# Patient Record
Sex: FEMALE | Race: WHITE | NOT HISPANIC OR LATINO | Employment: OTHER | URBAN - METROPOLITAN AREA
[De-identification: names, ages, dates, MRNs, and addresses within clinical notes are randomized per-mention and may not be internally consistent; named-entity substitution may affect disease eponyms.]

---

## 2017-01-05 ENCOUNTER — HOSPITAL ENCOUNTER (INPATIENT)
Facility: MEDICAL CENTER | Age: 47
LOS: 5 days | DRG: 556 | End: 2017-01-12
Attending: EMERGENCY MEDICINE | Admitting: HOSPITALIST
Payer: MEDICARE

## 2017-01-05 ENCOUNTER — APPOINTMENT (OUTPATIENT)
Dept: RADIOLOGY | Facility: MEDICAL CENTER | Age: 47
DRG: 556 | End: 2017-01-05
Attending: EMERGENCY MEDICINE
Payer: MEDICARE

## 2017-01-05 LAB
APTT PPP: 27.1 SEC (ref 24.7–36)
HCG SERPL QL: NEGATIVE
INR PPP: 1.04 (ref 0.87–1.13)
PROTHROMBIN TIME: 13.9 SEC (ref 12–14.6)

## 2017-01-05 PROCEDURE — 80053 COMPREHEN METABOLIC PANEL: CPT

## 2017-01-05 PROCEDURE — 85007 BL SMEAR W/DIFF WBC COUNT: CPT

## 2017-01-05 PROCEDURE — 85610 PROTHROMBIN TIME: CPT

## 2017-01-05 PROCEDURE — 84703 CHORIONIC GONADOTROPIN ASSAY: CPT

## 2017-01-05 PROCEDURE — 85652 RBC SED RATE AUTOMATED: CPT

## 2017-01-05 PROCEDURE — 85730 THROMBOPLASTIN TIME PARTIAL: CPT

## 2017-01-05 PROCEDURE — 99285 EMERGENCY DEPT VISIT HI MDM: CPT

## 2017-01-05 PROCEDURE — 83690 ASSAY OF LIPASE: CPT

## 2017-01-05 PROCEDURE — 82550 ASSAY OF CK (CPK): CPT

## 2017-01-05 PROCEDURE — 85027 COMPLETE CBC AUTOMATED: CPT

## 2017-01-05 PROCEDURE — 84443 ASSAY THYROID STIM HORMONE: CPT

## 2017-01-05 PROCEDURE — 84439 ASSAY OF FREE THYROXINE: CPT

## 2017-01-05 PROCEDURE — 36415 COLL VENOUS BLD VENIPUNCTURE: CPT

## 2017-01-05 PROCEDURE — 86140 C-REACTIVE PROTEIN: CPT

## 2017-01-05 RX ORDER — SODIUM CHLORIDE 9 MG/ML
1000 INJECTION, SOLUTION INTRAVENOUS ONCE
Status: COMPLETED | OUTPATIENT
Start: 2017-01-05 | End: 2017-01-06

## 2017-01-05 RX ORDER — MORPHINE SULFATE 4 MG/ML
4 INJECTION, SOLUTION INTRAMUSCULAR; INTRAVENOUS ONCE
Status: COMPLETED | OUTPATIENT
Start: 2017-01-05 | End: 2017-01-06

## 2017-01-05 ASSESSMENT — LIFESTYLE VARIABLES: DO YOU DRINK ALCOHOL: NO

## 2017-01-05 ASSESSMENT — PAIN SCALES - GENERAL: PAINLEVEL_OUTOF10: 9

## 2017-01-05 NOTE — IP AVS SNAPSHOT
Locassa Access Code: Activation code not generated  Current Locassa Status: Active    [employer]  A secure, online tool to manage your health information     [employer]’s Locassa® is a secure, online tool that connects you to your personalized health information from the privacy of your home -- day or night - making it very easy for you to manage your healthcare. Once the activation process is completed, you can even access your medical information using the Locassa brice, which is available for free in the Apple Brice store or Google Play store.     Locassa provides the following levels of access (as shown below):   My Chart Features   [facility] Primary Care Doctor [facility]  Specialists [facility]  Urgent  Care Non-[facility]  Primary Care  Doctor   Email your healthcare team securely and privately 24/7 X X X X   Manage appointments: schedule your next appointment; view details of past/upcoming appointments X      Request prescription refills. X      View recent personal medical records, including lab and immunizations X X X X   View health record, including health history, allergies, medications X X X X   Read reports about your outpatient visits, procedures, consult and ER notes X X X X   See your discharge summary, which is a recap of your hospital and/or ER visit that includes your diagnosis, lab results, and care plan. X X       How to register for Locassa:  1. Go to  [email or web address].  2. Click on the Sign Up Now box, which takes you to the New Member Sign Up page. You will need to provide the following information:  a. Enter your Locassa Access Code exactly as it appears at the top of this page. (You will not need to use this code after you’ve completed the sign-up process. If you do not sign up before the expiration date, you must request a new code.)   b. Enter your date of birth.   c. Enter your home email address.   d. Click Submit, and follow the next screen’s instructions.  3. Create a Locassa ID. This will  be your Invizeon login ID and cannot be changed, so think of one that is secure and easy to remember.  4. Create a Invizeon password. You can change your password at any time.  5. Enter your Password Reset Question and Answer. This can be used at a later time if you forget your password.   6. Enter your e-mail address. This allows you to receive e-mail notifications when new information is available in Invizeon.  7. Click Sign Up. You can now view your health information.    For assistance activating your Invizeon account, call [phone]

## 2017-01-05 NOTE — IP AVS SNAPSHOT
" After Visit Summary                                                                                                                  Name:Enedelia Pang  Medical Record Number:[id number]  CSN: [id number]    YOB: 1970   Age: 46 y.o.  Sex: female  HT:1.6 m (5' 2.99\") WT: 72.4 kg (159 lb 9.8 oz)          Admit Date: 1/5/2017     Discharge Date:   Today's Date: 1/12/2017  Attending Doctor:  Kinga Gallagher M.D.                  Allergies:  Prochlorperazine; Sumatriptan; and Vancomycin            Discharge Instructions       Discharge Instructions    Discharged to home by car with relative. Discharged via wheelchair, hospital escort: Yes.  Special equipment needed: Not Applicable    Be sure to schedule a follow-up appointment with your primary care doctor or any specialists as instructed.     Discharge Plan:   Diet Plan: Discussed  Activity Level: Discussed  Confirmed Follow up Appointment: Patient to Call and Schedule Appointment  Confirmed Symptoms Management: Discussed  Medication Reconciliation Updated: Yes  Influenza Vaccine Indication: Indicated: 9 to 64 years of age  Influenza Vaccine Given - only chart on this line when given: Influenza Vaccine Given (See MAR)    I understand that a diet low in cholesterol, fat, and sodium is recommended for good health. Unless I have been given specific instructions below for another diet, I accept this instruction as my diet prescription.   Other diet: regular      · Is patient discharged on Warfarin / Coumadin?   No     · Is patient Post Blood Transfusion?  No    Depression / Suicide Risk    As you are discharged from this [facility] facility, it is important to learn how to keep safe from harming yourself.    Recognize the warning signs:  · Abrupt changes in personality, positive or negative- including increase in energy   · Giving away possessions  · Change in eating patterns- significant weight changes-  positive or negative  · Change in sleeping patterns- " unable to sleep or sleeping all the time   · Unwillingness or inability to communicate  · Depression  · Unusual sadness, discouragement and loneliness  · Talk of wanting to die  · Neglect of personal appearance   · Rebelliousness- reckless behavior  · Withdrawal from people/activities they love  · Confusion- inability to concentrate     If you or a loved one observes any of these behaviors or has concerns about self-harm, here's what you can do:  · Talk about it- your feelings and reasons for harming yourself  · Remove any means that you might use to hurt yourself (examples: pills, rope, extension cords, firearm)  · Get professional help from the community (Mental Health, Substance Abuse, psychological counseling)  · Do not be alone:Call your Safe Contact- someone whom you trust who will be there for you.  · Call your local CRISIS HOTLINE 245-0440 or 255-984-8657  · Call your local Children's Mobile Crisis Response Team [place] (342) 721-1488 or www.Semasio  · Call the toll free National Suicide Prevention Hotlines   · National Suicide Prevention Lifeline 132-143-ZUIK (0224)  · National Hope Line Network 800-SUICIDE (746-7722)        Your appointments     Jan 17, 2017  8:30 AM   Follow Up Visit with Aimee Andrade M.D.   [facility] (--)    [address] [place] [address] [address]  [facility] [address] [phone]           You will be receiving a confirmation call a few days before your appointment from our automated call confirmation system.              Follow-up Information     1. Follow up with Elliott Power M.D. In 1 week.    Specialty:  Family Medicine    Contact information    [address] [facility] [address] [phone]          2. Follow up with Aimee Andrade M.D. In 1 week.    Specialty:  Rheumatology    Contact information    [address] [facility] [address]  [facility] [address] [phone]           Discharge Medication Instructions:    Below are the medications your physician expects you to take  upon discharge:    Review all your home medications and newly ordered medications with your doctor and/or pharmacist. Follow medication instructions as directed by your doctor and/or pharmacist.    Please keep your medication list with you and share with your physician.               Medication List      START taking these medications        Instructions    calcium carbonate 500 MG Tabs   Last time this was given:  500 mg on 1/12/2017  8:23 AM   Commonly known as:  OS-ZAIN 500    Take 1 Tab by mouth 2 times a day, with meals.   Dose:  500 mg       Cholecalciferol 1000 UNIT Tabs   Last time this was given:  1,000 Units on 1/12/2017  8:23 AM   Commonly known as:  VITAMIND D3    Take 1 Tab by mouth every day.   Dose:  1000 Units       cyclobenzaprine 10 MG Tabs   Last time this was given:  10 mg on 1/11/2017  9:57 PM   Commonly known as:  FLEXERIL    Take 1 Tab by mouth 3 times a day as needed for Muscle Spasms.   Dose:  10 mg       ferrous gluconate 324 (38 FE) MG Tabs   Last time this was given:  324 mg on 1/12/2017  8:23 AM   Commonly known as:  FERGON    Take 1 Tab by mouth 2 times a day, with meals.   Dose:  324 mg       omeprazole 20 MG delayed-release capsule   Last time this was given:  20 mg on 1/12/2017  8:23 AM   Commonly known as:  PRILOSEC    Take 1 Cap by mouth every day.   Dose:  20 mg       predniSONE 20 MG Tabs   Last time this was given:  40 mg on 1/12/2017  8:23 AM   Commonly known as:  DELTASONE    Prednisone 40 mg 2 more days then start to taper to 30 mg x 5 days then 20 mg daily x 5 days then 10 mg po q day x 1 week then 5 mg po q day x 1 week then stop.         CONTINUE taking these medications        Instructions    acetaZOLAMIDE  MG Cp12   Last time this was given:  500 mg on 1/12/2017  8:23 AM   Commonly known as:  DIAMOX    Take 500 mg by mouth 2 times a day.   Dose:  500 mg       cephALEXin 500 MG Caps   Commonly known as:  KEFLEX    Take 500 mg by mouth 4 times a day. 5 day course,  finished 12/24/16   Dose:  500 mg       * diphenhydrAMINE 50 MG tablet   Last time this was given:  25 mg on 1/9/2017  1:39 PM   Commonly known as:  BENADRYL    Take 1 Tab by mouth every 6 hours as needed (Headache, give with toradol).   Dose:  50 mg       * diphenhydrAMINE 50 MG/ML Soln   Last time this was given:  50 mg on 1/12/2017  9:25 AM   Commonly known as:  BENADRYL    INJECT IM EVERY 6 HOURS AS NEEDED FOR ACUTE, SEVERE MIGRAINE HEADACHE AS DIRECTED       duloxetine 60 MG Cpep delayed-release capsule   Last time this was given:  60 mg on 1/12/2017  8:23 AM   Commonly known as:  CYMBALTA    Take 60 mg by mouth 2 times a day.   Dose:  60 mg       gabapentin 600 MG tablet   Commonly known as:  NEURONTIN    Take 600 mg by mouth 3 times a day.   Dose:  600 mg       ketorolac 60 MG/2ML Soln   Commonly known as:  TORADOL    60 mg by Intramuscular route Once PRN (migraine).   Dose:  60 mg       MAGNESIUM OXIDE PO    Take 1 Tab by mouth every day.   Dose:  1 Tab       MULTIVITAMIN ADULT PO    Take 1 Tab by mouth every day.   Dose:  1 Tab       oxycodone immediate release 10 MG immediate release tablet   Last time this was given:  15 mg on 1/12/2017  8:28 AM   Commonly known as:  ROXICODONE    Take 1 Tab by mouth every four hours as needed for Moderate Pain.   Dose:  10 mg       VITAMIN B 12 PO    Take 1 Tab by mouth every day.   Dose:  1 Tab       * Notice:  This list has 2 medication(s) that are the same as other medications prescribed for you. Read the directions carefully, and ask your doctor or other care provider to review them with you.            Instructions           Diet / Nutrition:    Follow any diet instructions given to you by your doctor or the dietician, including how much salt (sodium) you are allowed each day.    If you are overweight, talk to your doctor about a weight reduction plan.    Activity:    Remain physically active following your doctor's instructions about exercise and activity.    Rest  often.     Any time you become even a little tired or short of breath, SIT DOWN and rest.    Worsening Symptoms:    Report any of the following signs and symptoms to the doctor's office immediately:    *Pain of jaw, arm, or neck  *Chest pain not relieved by medication                               *Dizziness or loss of consciousness  *Difficulty breathing even when at rest   *More tired than usual                                       *Bleeding drainage or swelling of surgical site  *Swelling of feet, ankles, legs or stomach                 *Fever (>100ºF)  *Pink or blood tinged sputum  *Weight gain (3lbs/day or 5lbs /week)           *Shock from internal defibrillator (if applicable)  *Palpitations or irregular heartbeats                *Cool and/or numb extremities    Stroke Awareness    Common Risk Factors for Stroke include:    Age  Atrial Fibrillation  Carotid Artery Stenosis  Diabetes Mellitus  Excessive alcohol consumption  High blood pressure  Overweight   Physical inactivity  Smoking    Warning signs and symptoms of a stroke include:    *Sudden numbness or weakness of the face, arm or leg (especially on one side of the body).  *Sudden confusion, trouble speaking or understanding.  *Sudden trouble seeing in one or both eyes.  *Sudden trouble walking, dizziness, loss of balance or coordination.Sudden severe headache with no known cause.    It is very important to get treatment quickly when a stroke occurs. If you experience any of the above warning signs, call 911 immediately.                   Disclaimer         Quit Smoking / Tobacco Use:    I understand the use of any tobacco products increases my chance of suffering from future heart disease or stroke and could cause other illnesses which may shorten my life. Quitting the use of tobacco products is the single most important thing I can do to improve my health. For further information on smoking / tobacco cessation call a Toll Free Quit Line at  1-115.604.1135 (*National Cancer [place]) or 1-511.129.1537 (American Lung Association) or you can access the web based program at www.lungusa.org.    [place] Tobacco Users Help Line:  (350) 142-8057       Toll Free: 1-802.448.8367  Quit Tobacco Program [facility] (774)153-6592    Crisis Hotline:    [place] Crisis Hotline:  9-674-FPZCNCW or 1-867.158.3767    [place] Crisis Hotline:    1-841.598.4596 or 419-523-9273    Discharge Survey:   Thank you for choosing [facility]. We hope we did everything we could to make your hospital stay a pleasant one. You may be receiving a phone survey and we would appreciate your time and participation in answering the questions. Your input is very valuable to us in our efforts to improve our service to our patients and their families.        My signature on this form indicates that:    1. I have reviewed and understand the above information.  2. My questions regarding this information have been answered to my satisfaction.  3. I have formulated a plan with my discharge nurse to obtain my prescribed medications for home.                  Disclaimer         __________________________________                     __________       ________                       Patient Signature                                                 Date                    Time

## 2017-01-05 NOTE — ED NOTES
"Chief Complaint   Patient presents with   • Pain     Pt BIB self/family to triage by wheelchair. Pt reports yesterday having sudden episode of difficulty swallowing/choking, Pt reports waking up today with body pain in joints, pt describes pain to be \"screaming\". pt reports more affected right side, pt reports hx of stroke with minimal right sided defecits.      Pt reports surgery two weeks ago, occipital stimulator battery replaced.   Explained to pt triage process, made pt aware to tell this RN of any changes/concerns, pt verbalized understanding of process and instructions given. Pt to ER lobby.    "

## 2017-01-05 NOTE — IP AVS SNAPSHOT
1/12/2017          Enedelia Pang  [address] [place] [address]    Dear Enedelia:    [facility] wants to ensure your discharge home is safe and you or your loved ones have had all your questions answered regarding your care after you leave the hospital.    You may receive a telephone call within two days of your discharge.  This call is to make certain you understand your discharge instructions as well as ensure we provided you with the best care possible during your stay with us.     The call will only last approximately 3-5 minutes and will be done by a nurse.    Once again, we want to ensure your discharge home is safe and that you have a clear understanding of any next steps in your care.  If you have any questions or concerns, please do not hesitate to contact us, we are here for you.  Thank you for choosing [facility] for your healthcare needs.    Sincerely,    Michael Guy    [facility]

## 2017-01-05 NOTE — IP AVS SNAPSHOT
" <p align=\"LEFT\"><IMG SRC=\"//EMRWB/blob$/Images/[email or web address]\" alt=\"Image\" WIDTH=\"50%\" HEIGHT=\"200\" BORDER=\"\"></p>                   Name:Enedelia Pang  Medical Record Number:[id number]  CSN: [id number]    YOB: 1970   Age: 46 y.o.  Sex: female  HT:1.6 m (5' 2.99\") WT: 72.4 kg (159 lb 9.8 oz)          Admit Date: 1/5/2017     Discharge Date:   Today's Date: 1/12/2017  Attending Doctor:  Kinga Gallagher M.D.                  Allergies:  Prochlorperazine; Sumatriptan; and Vancomycin          Your appointments     Jan 17, 2017  8:30 AM   Follow Up Visit with Aimee Andrade M.D.   [facility] (--)    [address] [place] [address] [address]  [facility] [address] [phone]           You will be receiving a confirmation call a few days before your appointment from our automated call confirmation system.              Follow-up Information     1. Follow up with Elliott Power M.D. In 1 week.    Specialty:  Family Medicine    Contact information    [address] [facility] [address] [phone]          2. Follow up with Aimee Andrade M.D. In 1 week.    Specialty:  Rheumatology    Contact information    [address] [place] [address] [phone]           Medication List      Take these Medications        Instructions    acetaZOLAMIDE  MG Cp12   Commonly known as:  DIAMOX    Take 500 mg by mouth 2 times a day.   Dose:  500 mg       calcium carbonate 500 MG Tabs   Commonly known as:  OS-ZAIN 500    Take 1 Tab by mouth 2 times a day, with meals.   Dose:  500 mg       cephALEXin 500 MG Caps   Commonly known as:  KEFLEX    Take 500 mg by mouth 4 times a day. 5 day course, finished 12/24/16   Dose:  500 mg       Cholecalciferol 1000 UNIT Tabs   Commonly known as:  VITAMIND D3    Take 1 Tab by mouth every day.   Dose:  1000 Units       cyclobenzaprine 10 MG Tabs   Commonly known as:  FLEXERIL    Take 1 Tab by mouth 3 times a day as needed for Muscle Spasms.   Dose:  10 mg       * diphenhydrAMINE 50 MG " tablet   Commonly known as:  BENADRYL    Take 1 Tab by mouth every 6 hours as needed (Headache, give with toradol).   Dose:  50 mg       * diphenhydrAMINE 50 MG/ML Soln   Commonly known as:  BENADRYL    INJECT IM EVERY 6 HOURS AS NEEDED FOR ACUTE, SEVERE MIGRAINE HEADACHE AS DIRECTED       duloxetine 60 MG Cpep delayed-release capsule   Commonly known as:  CYMBALTA    Take 60 mg by mouth 2 times a day.   Dose:  60 mg       ferrous gluconate 324 (38 FE) MG Tabs   Commonly known as:  FERGON    Take 1 Tab by mouth 2 times a day, with meals.   Dose:  324 mg       gabapentin 600 MG tablet   Commonly known as:  NEURONTIN    Take 600 mg by mouth 3 times a day.   Dose:  600 mg       ketorolac 60 MG/2ML Soln   Commonly known as:  TORADOL    60 mg by Intramuscular route Once PRN (migraine).   Dose:  60 mg       MAGNESIUM OXIDE PO    Take 1 Tab by mouth every day.   Dose:  1 Tab       MULTIVITAMIN ADULT PO    Take 1 Tab by mouth every day.   Dose:  1 Tab       omeprazole 20 MG delayed-release capsule   Commonly known as:  PRILOSEC    Take 1 Cap by mouth every day.   Dose:  20 mg       oxycodone immediate release 10 MG immediate release tablet   Commonly known as:  ROXICODONE    Take 1 Tab by mouth every four hours as needed for Moderate Pain.   Dose:  10 mg       predniSONE 20 MG Tabs   Commonly known as:  DELTASONE    Prednisone 40 mg 2 more days then start to taper to 30 mg x 5 days then 20 mg daily x 5 days then 10 mg po q day x 1 week then 5 mg po q day x 1 week then stop.       VITAMIN B 12 PO    Take 1 Tab by mouth every day.   Dose:  1 Tab       * Notice:  This list has 2 medication(s) that are the same as other medications prescribed for you. Read the directions carefully, and ask your doctor or other care provider to review them with you.

## 2017-01-06 ENCOUNTER — RESOLUTE PROFESSIONAL BILLING HOSPITAL PROF FEE (OUTPATIENT)
Dept: HOSPITALIST | Facility: MEDICAL CENTER | Age: 47
End: 2017-01-06
Payer: MEDICAID

## 2017-01-06 PROBLEM — M25.449 SWELLING OF JOINT OF HAND: Status: ACTIVE | Noted: 2017-01-06

## 2017-01-06 LAB
ALBUMIN SERPL BCP-MCNC: 4 G/DL (ref 3.2–4.9)
ALBUMIN/GLOB SERPL: 1.1 G/DL
ALP SERPL-CCNC: 217 U/L (ref 30–99)
ALT SERPL-CCNC: 67 U/L (ref 2–50)
ANION GAP SERPL CALC-SCNC: 12 MMOL/L (ref 0–11.9)
ANISOCYTOSIS BLD QL SMEAR: ABNORMAL
APPEARANCE UR: CLEAR
AST SERPL-CCNC: 65 U/L (ref 12–45)
BASOPHILS # BLD AUTO: 0 % (ref 0–1.8)
BASOPHILS # BLD: 0 K/UL (ref 0–0.12)
BILIRUB SERPL-MCNC: 0.5 MG/DL (ref 0.1–1.5)
BILIRUB UR QL STRIP.AUTO: NEGATIVE
BLOOD CULTURE HOLD CXBCH: NORMAL
BUN SERPL-MCNC: 9 MG/DL (ref 8–22)
C3 SERPL-MCNC: 178 MG/DL (ref 87–200)
C4 SERPL-MCNC: 37 MG/DL (ref 19–52)
CALCIUM SERPL-MCNC: 9.4 MG/DL (ref 8.5–10.5)
CHLORIDE SERPL-SCNC: 109 MMOL/L (ref 96–112)
CK SERPL-CCNC: 46 U/L (ref 0–154)
CO2 SERPL-SCNC: 18 MMOL/L (ref 20–33)
COLOR UR: COLORLESS
CREAT SERPL-MCNC: 0.55 MG/DL (ref 0.5–1.4)
CRP SERPL HS-MCNC: 7.92 MG/DL (ref 0–0.75)
CULTURE IF INDICATED INDCX: NO UA CULTURE
EOSINOPHIL # BLD AUTO: 0 K/UL (ref 0–0.51)
EOSINOPHIL NFR BLD: 0 % (ref 0–6.9)
ERYTHROCYTE [DISTWIDTH] IN BLOOD BY AUTOMATED COUNT: 53.1 FL (ref 35.9–50)
ERYTHROCYTE [SEDIMENTATION RATE] IN BLOOD BY WESTERGREN METHOD: 66 MM/HOUR (ref 0–20)
GFR SERPL CREATININE-BSD FRML MDRD: >60 ML/MIN/1.73 M 2
GLOBULIN SER CALC-MCNC: 3.5 G/DL (ref 1.9–3.5)
GLUCOSE SERPL-MCNC: 108 MG/DL (ref 65–99)
GLUCOSE UR STRIP.AUTO-MCNC: NEGATIVE MG/DL
HBV SURFACE AG SER QL: NEGATIVE
HCT VFR BLD AUTO: 32.3 % (ref 37–47)
HGB BLD-MCNC: 9.3 G/DL (ref 12–16)
HYPOCHROMIA BLD QL SMEAR: ABNORMAL
KETONES UR STRIP.AUTO-MCNC: NEGATIVE MG/DL
LEUKOCYTE ESTERASE UR QL STRIP.AUTO: NEGATIVE
LG PLATELETS BLD QL SMEAR: NORMAL
LIPASE SERPL-CCNC: 14 U/L (ref 11–82)
LYMPHOCYTES # BLD AUTO: 1.16 K/UL (ref 1–4.8)
LYMPHOCYTES NFR BLD: 15 % (ref 22–41)
MANUAL DIFF BLD: NORMAL
MCH RBC QN AUTO: 21.1 PG (ref 27–33)
MCHC RBC AUTO-ENTMCNC: 28.8 G/DL (ref 33.6–35)
MCV RBC AUTO: 73.4 FL (ref 81.4–97.8)
MICRO URNS: NORMAL
MICROCYTES BLD QL SMEAR: ABNORMAL
MONOCYTES # BLD AUTO: 0.41 K/UL (ref 0–0.85)
MONOCYTES NFR BLD AUTO: 5.3 % (ref 0–13.4)
MORPHOLOGY BLD-IMP: NORMAL
NEUTROPHILS # BLD AUTO: 6.14 K/UL (ref 2–7.15)
NEUTROPHILS NFR BLD: 78.8 % (ref 44–72)
NEUTS BAND NFR BLD MANUAL: 0.9 % (ref 0–10)
NITRITE UR QL STRIP.AUTO: NEGATIVE
NRBC # BLD AUTO: 0.02 K/UL
NRBC BLD AUTO-RTO: 0.3 /100 WBC
OVALOCYTES BLD QL SMEAR: NORMAL
PH UR STRIP.AUTO: 7.5 [PH]
PLATELET # BLD AUTO: 538 K/UL (ref 164–446)
PLATELET BLD QL SMEAR: NORMAL
PMV BLD AUTO: 9.8 FL (ref 9–12.9)
POIKILOCYTOSIS BLD QL SMEAR: NORMAL
POLYCHROMASIA BLD QL SMEAR: NORMAL
POTASSIUM SERPL-SCNC: 3.8 MMOL/L (ref 3.6–5.5)
PROT SERPL-MCNC: 7.5 G/DL (ref 6–8.2)
PROT UR QL STRIP: NEGATIVE MG/DL
RBC # BLD AUTO: 4.4 M/UL (ref 4.2–5.4)
RBC BLD AUTO: PRESENT
RBC UR QL AUTO: NEGATIVE
RHEUMATOID FACT SER IA-ACNC: <10 IU/ML (ref 0–14)
RUBV AB SER QL: 112.7 IU/ML
SODIUM SERPL-SCNC: 139 MMOL/L (ref 135–145)
SP GR UR STRIP.AUTO: 1.01
T4 FREE SERPL-MCNC: 0.76 NG/DL (ref 0.53–1.43)
TSH SERPL DL<=0.005 MIU/L-ACNC: 0.74 UIU/ML (ref 0.3–3.7)
WBC # BLD AUTO: 7.7 K/UL (ref 4.8–10.8)

## 2017-01-06 PROCEDURE — 83516 IMMUNOASSAY NONANTIBODY: CPT

## 2017-01-06 PROCEDURE — 96376 TX/PRO/DX INJ SAME DRUG ADON: CPT

## 2017-01-06 PROCEDURE — 96372 THER/PROPH/DIAG INJ SC/IM: CPT

## 2017-01-06 PROCEDURE — 96361 HYDRATE IV INFUSION ADD-ON: CPT

## 2017-01-06 PROCEDURE — 96375 TX/PRO/DX INJ NEW DRUG ADDON: CPT

## 2017-01-06 PROCEDURE — 86762 RUBELLA ANTIBODY: CPT

## 2017-01-06 PROCEDURE — 700102 HCHG RX REV CODE 250 W/ 637 OVERRIDE(OP): Performed by: HOSPITALIST

## 2017-01-06 PROCEDURE — 36415 COLL VENOUS BLD VENIPUNCTURE: CPT

## 2017-01-06 PROCEDURE — 86255 FLUORESCENT ANTIBODY SCREEN: CPT

## 2017-01-06 PROCEDURE — 96374 THER/PROPH/DIAG INJ IV PUSH: CPT

## 2017-01-06 PROCEDURE — 302131 K PAD MOTOR: Performed by: HOSPITALIST

## 2017-01-06 PROCEDURE — A9270 NON-COVERED ITEM OR SERVICE: HCPCS | Performed by: HOSPITALIST

## 2017-01-06 PROCEDURE — G0378 HOSPITAL OBSERVATION PER HR: HCPCS

## 2017-01-06 PROCEDURE — 81003 URINALYSIS AUTO W/O SCOPE: CPT

## 2017-01-06 PROCEDURE — 82550 ASSAY OF CK (CPK): CPT

## 2017-01-06 PROCEDURE — 86160 COMPLEMENT ANTIGEN: CPT

## 2017-01-06 PROCEDURE — 86038 ANTINUCLEAR ANTIBODIES: CPT

## 2017-01-06 PROCEDURE — 700111 HCHG RX REV CODE 636 W/ 250 OVERRIDE (IP): Performed by: HOSPITALIST

## 2017-01-06 PROCEDURE — 87591 N.GONORRHOEAE DNA AMP PROB: CPT

## 2017-01-06 PROCEDURE — 87040 BLOOD CULTURE FOR BACTERIA: CPT

## 2017-01-06 PROCEDURE — 87491 CHLMYD TRACH DNA AMP PROBE: CPT

## 2017-01-06 PROCEDURE — 302151 K-PAD 14X20: Performed by: HOSPITALIST

## 2017-01-06 PROCEDURE — 700105 HCHG RX REV CODE 258: Performed by: EMERGENCY MEDICINE

## 2017-01-06 PROCEDURE — 700111 HCHG RX REV CODE 636 W/ 250 OVERRIDE (IP): Performed by: NURSE PRACTITIONER

## 2017-01-06 PROCEDURE — 70450 CT HEAD/BRAIN W/O DYE: CPT

## 2017-01-06 PROCEDURE — 82552 ASSAY OF CPK IN BLOOD: CPT

## 2017-01-06 PROCEDURE — 87340 HEPATITIS B SURFACE AG IA: CPT

## 2017-01-06 PROCEDURE — 86762 RUBELLA ANTIBODY: CPT | Mod: 91

## 2017-01-06 PROCEDURE — 86431 RHEUMATOID FACTOR QUANT: CPT

## 2017-01-06 PROCEDURE — 99220 PR INITIAL OBSERVATION CARE,LEVL III: CPT | Performed by: HOSPITALIST

## 2017-01-06 PROCEDURE — 700105 HCHG RX REV CODE 258: Performed by: HOSPITALIST

## 2017-01-06 PROCEDURE — 86235 NUCLEAR ANTIGEN ANTIBODY: CPT

## 2017-01-06 PROCEDURE — 700111 HCHG RX REV CODE 636 W/ 250 OVERRIDE (IP): Performed by: EMERGENCY MEDICINE

## 2017-01-06 RX ORDER — POLYETHYLENE GLYCOL 3350 17 G/17G
1 POWDER, FOR SOLUTION ORAL DAILY
Status: DISCONTINUED | OUTPATIENT
Start: 2017-01-06 | End: 2017-01-12 | Stop reason: HOSPADM

## 2017-01-06 RX ORDER — DULOXETIN HYDROCHLORIDE 60 MG/1
60 CAPSULE, DELAYED RELEASE ORAL 2 TIMES DAILY
Status: DISCONTINUED | OUTPATIENT
Start: 2017-01-06 | End: 2017-01-12 | Stop reason: HOSPADM

## 2017-01-06 RX ORDER — SODIUM CHLORIDE 9 MG/ML
1000 INJECTION, SOLUTION INTRAVENOUS ONCE
Status: COMPLETED | OUTPATIENT
Start: 2017-01-06 | End: 2017-01-06

## 2017-01-06 RX ORDER — DEXAMETHASONE SODIUM PHOSPHATE 4 MG/ML
4 INJECTION, SOLUTION INTRA-ARTICULAR; INTRALESIONAL; INTRAMUSCULAR; INTRAVENOUS; SOFT TISSUE EVERY 6 HOURS
Status: DISCONTINUED | OUTPATIENT
Start: 2017-01-06 | End: 2017-01-06

## 2017-01-06 RX ORDER — OXYCODONE HYDROCHLORIDE 10 MG/1
10 TABLET ORAL EVERY 4 HOURS PRN
Status: DISCONTINUED | OUTPATIENT
Start: 2017-01-06 | End: 2017-01-09

## 2017-01-06 RX ORDER — KETOROLAC TROMETHAMINE 30 MG/ML
30 INJECTION, SOLUTION INTRAMUSCULAR; INTRAVENOUS EVERY 6 HOURS PRN
Status: DISCONTINUED | OUTPATIENT
Start: 2017-01-06 | End: 2017-01-06

## 2017-01-06 RX ORDER — ACETAZOLAMIDE 500 MG/1
500 CAPSULE, EXTENDED RELEASE ORAL 2 TIMES DAILY
Status: DISCONTINUED | OUTPATIENT
Start: 2017-01-06 | End: 2017-01-12 | Stop reason: HOSPADM

## 2017-01-06 RX ORDER — HEPARIN SODIUM 5000 [USP'U]/ML
5000 INJECTION, SOLUTION INTRAVENOUS; SUBCUTANEOUS EVERY 8 HOURS
Status: DISCONTINUED | OUTPATIENT
Start: 2017-01-06 | End: 2017-01-10

## 2017-01-06 RX ORDER — LABETALOL HYDROCHLORIDE 5 MG/ML
10 INJECTION, SOLUTION INTRAVENOUS EVERY 4 HOURS PRN
Status: DISCONTINUED | OUTPATIENT
Start: 2017-01-06 | End: 2017-01-12 | Stop reason: HOSPADM

## 2017-01-06 RX ORDER — DIPHENHYDRAMINE HYDROCHLORIDE 50 MG/ML
50 INJECTION INTRAMUSCULAR; INTRAVENOUS ONCE
Status: COMPLETED | OUTPATIENT
Start: 2017-01-06 | End: 2017-01-06

## 2017-01-06 RX ORDER — PREDNISONE 20 MG/1
40 TABLET ORAL DAILY
Status: DISCONTINUED | OUTPATIENT
Start: 2017-01-06 | End: 2017-01-12 | Stop reason: HOSPADM

## 2017-01-06 RX ORDER — GABAPENTIN 400 MG/1
400 CAPSULE ORAL 3 TIMES DAILY
Status: DISCONTINUED | OUTPATIENT
Start: 2017-01-06 | End: 2017-01-12 | Stop reason: HOSPADM

## 2017-01-06 RX ORDER — MORPHINE SULFATE 4 MG/ML
2-4 INJECTION, SOLUTION INTRAMUSCULAR; INTRAVENOUS EVERY 4 HOURS PRN
Status: DISCONTINUED | OUTPATIENT
Start: 2017-01-06 | End: 2017-01-06

## 2017-01-06 RX ORDER — GABAPENTIN 600 MG/1
600 TABLET ORAL 3 TIMES DAILY
COMMUNITY
End: 2020-03-08

## 2017-01-06 RX ORDER — CEPHALEXIN 500 MG/1
500 CAPSULE ORAL 4 TIMES DAILY
COMMUNITY
Start: 2016-12-19 | End: 2017-04-29

## 2017-01-06 RX ORDER — ACETAMINOPHEN 325 MG/1
650 TABLET ORAL EVERY 6 HOURS PRN
Status: DISCONTINUED | OUTPATIENT
Start: 2017-01-06 | End: 2017-01-12 | Stop reason: HOSPADM

## 2017-01-06 RX ORDER — KETOROLAC TROMETHAMINE 30 MG/ML
60 INJECTION, SOLUTION INTRAMUSCULAR; INTRAVENOUS
COMMUNITY
End: 2017-04-29

## 2017-01-06 RX ORDER — KETOROLAC TROMETHAMINE 30 MG/ML
30 INJECTION, SOLUTION INTRAMUSCULAR; INTRAVENOUS EVERY 6 HOURS PRN
Status: DISCONTINUED | OUTPATIENT
Start: 2017-01-06 | End: 2017-01-08

## 2017-01-06 RX ADMIN — SODIUM CHLORIDE 1000 ML: 9 INJECTION, SOLUTION INTRAVENOUS at 03:58

## 2017-01-06 RX ADMIN — DULOXETINE HYDROCHLORIDE 60 MG: 60 CAPSULE, DELAYED RELEASE ORAL at 08:08

## 2017-01-06 RX ADMIN — DULOXETINE HYDROCHLORIDE 60 MG: 60 CAPSULE, DELAYED RELEASE ORAL at 20:06

## 2017-01-06 RX ADMIN — HEPARIN SODIUM 5000 UNITS: 5000 INJECTION INTRAVENOUS; SUBCUTANEOUS at 16:16

## 2017-01-06 RX ADMIN — HYDROMORPHONE HYDROCHLORIDE 0.5 MG: 1 INJECTION, SOLUTION INTRAMUSCULAR; INTRAVENOUS; SUBCUTANEOUS at 22:15

## 2017-01-06 RX ADMIN — HYDROMORPHONE HYDROCHLORIDE 0.5 MG: 1 INJECTION, SOLUTION INTRAMUSCULAR; INTRAVENOUS; SUBCUTANEOUS at 14:02

## 2017-01-06 RX ADMIN — ACETAZOLAMIDE 500 MG: 500 CAPSULE, EXTENDED RELEASE ORAL at 20:06

## 2017-01-06 RX ADMIN — HYDROMORPHONE HYDROCHLORIDE 0.5 MG: 1 INJECTION, SOLUTION INTRAMUSCULAR; INTRAVENOUS; SUBCUTANEOUS at 20:08

## 2017-01-06 RX ADMIN — MORPHINE SULFATE 4 MG: 4 INJECTION INTRAVENOUS at 04:18

## 2017-01-06 RX ADMIN — HYDROMORPHONE HYDROCHLORIDE 0.5 MG: 1 INJECTION, SOLUTION INTRAMUSCULAR; INTRAVENOUS; SUBCUTANEOUS at 18:18

## 2017-01-06 RX ADMIN — PREDNISONE 40 MG: 20 TABLET ORAL at 09:03

## 2017-01-06 RX ADMIN — OXYCODONE HYDROCHLORIDE 10 MG: 5 TABLET ORAL at 08:05

## 2017-01-06 RX ADMIN — DEXAMETHASONE SODIUM PHOSPHATE 4 MG: 4 INJECTION, SOLUTION INTRAMUSCULAR; INTRAVENOUS at 06:06

## 2017-01-06 RX ADMIN — HYDROMORPHONE HYDROCHLORIDE 1 MG: 1 INJECTION, SOLUTION INTRAMUSCULAR; INTRAVENOUS; SUBCUTANEOUS at 05:23

## 2017-01-06 RX ADMIN — OXYCODONE HYDROCHLORIDE 10 MG: 5 TABLET ORAL at 12:08

## 2017-01-06 RX ADMIN — ACETAZOLAMIDE 500 MG: 500 CAPSULE, EXTENDED RELEASE ORAL at 08:07

## 2017-01-06 RX ADMIN — CAPSICUM OLEORESIN: 0.25 CREAM TOPICAL at 14:03

## 2017-01-06 RX ADMIN — CAPSICUM OLEORESIN: 0.25 CREAM TOPICAL at 10:21

## 2017-01-06 RX ADMIN — GABAPENTIN 400 MG: 400 CAPSULE ORAL at 20:06

## 2017-01-06 RX ADMIN — SODIUM CHLORIDE 1000 ML: 9 INJECTION, SOLUTION INTRAVENOUS at 00:21

## 2017-01-06 RX ADMIN — DIPHENHYDRAMINE HYDROCHLORIDE 50 MG: 50 INJECTION INTRAMUSCULAR; INTRAVENOUS at 10:21

## 2017-01-06 RX ADMIN — KETOROLAC TROMETHAMINE 30 MG: 30 INJECTION, SOLUTION INTRAMUSCULAR; INTRAVENOUS at 09:04

## 2017-01-06 RX ADMIN — GABAPENTIN 400 MG: 400 CAPSULE ORAL at 08:05

## 2017-01-06 RX ADMIN — HYDROMORPHONE HYDROCHLORIDE 1 MG: 1 INJECTION, SOLUTION INTRAMUSCULAR; INTRAVENOUS; SUBCUTANEOUS at 01:50

## 2017-01-06 RX ADMIN — CAPSICUM OLEORESIN: 0.25 CREAM TOPICAL at 20:07

## 2017-01-06 RX ADMIN — GABAPENTIN 400 MG: 400 CAPSULE ORAL at 15:00

## 2017-01-06 RX ADMIN — MORPHINE SULFATE 4 MG: 4 INJECTION INTRAVENOUS at 00:21

## 2017-01-06 RX ADMIN — HYDROMORPHONE HYDROCHLORIDE 0.5 MG: 1 INJECTION, SOLUTION INTRAMUSCULAR; INTRAVENOUS; SUBCUTANEOUS at 16:15

## 2017-01-06 ASSESSMENT — PAIN SCALES - GENERAL
PAINLEVEL_OUTOF10: 7
PAINLEVEL_OUTOF10: 8
PAINLEVEL_OUTOF10: 10
PAINLEVEL_OUTOF10: 7
PAINLEVEL_OUTOF10: 9
PAINLEVEL_OUTOF10: 9
PAINLEVEL_OUTOF10: 10
PAINLEVEL_OUTOF10: 9
PAINLEVEL_OUTOF10: 10
PAINLEVEL_OUTOF10: 8
PAINLEVEL_OUTOF10: 7
PAINLEVEL_OUTOF10: 10
PAINLEVEL_OUTOF10: 8
PAINLEVEL_OUTOF10: 9
PAINLEVEL_OUTOF10: 7

## 2017-01-06 ASSESSMENT — ENCOUNTER SYMPTOMS
DIARRHEA: 0
FEVER: 0
BLURRED VISION: 0
DIZZINESS: 0
WEIGHT LOSS: 0
FOCAL WEAKNESS: 0
MYALGIAS: 0
VOMITING: 0
SHORTNESS OF BREATH: 0
DOUBLE VISION: 0
CHILLS: 0
PALPITATIONS: 0
WEAKNESS: 1
ABDOMINAL PAIN: 0
COUGH: 0
NAUSEA: 0
HEADACHES: 0
CONSTIPATION: 0

## 2017-01-06 ASSESSMENT — LIFESTYLE VARIABLES
EVER_SMOKED: NEVER
DO YOU DRINK ALCOHOL: NO

## 2017-01-06 ASSESSMENT — PAIN SCALES - WONG BAKER
WONGBAKER_NUMERICALRESPONSE: HURTS A WHOLE LOT
WONGBAKER_NUMERICALRESPONSE: HURTS A WHOLE LOT

## 2017-01-06 NOTE — DISCHARGE PLANNING
Care Transition Team Assessment    Information Source  Orientation : Oriented x 4  Who is responsible for making decisions for patient? : Patient  Source of Information: Patient  Primary Caregiver for Others?: No  Why do you believe you were admitted?:  (all over joint pain)    Readmission Evaluation  Is this a readmission?: No    Elopement Risk  Legal Hold: No  Ambulatory or Self Mobile in Wheelchair: No-Not an Elopement Risk    Interdisciplinary Discharge Planning  Does Admitting Nurse Feel This Could be a Complex Discharge?: No  Primary Care Physician:  (Dr. Elliott Power)  Lives with - Patient's Self Care Capacity: Spouse  Patient or legal guardian wants to designate a caregiver (see row info): Yes  Caregiver name:  (Benoit (spouse) [phone])  Caregiver relationship to patient:  (spouse)  Caregiver contact info:  ([phone])  Support Systems: Family Member(s)  Housing / Facility: [address] [place] [address] House  Do You Take your Prescribed Medications Regularly: Yes  Able to Return to Previous ADL's: Yes  Mobility Issues: No  Prior Services:  (Skilled home health was prior to moving to [place])  Patient Expects to be Discharged to::  (home)  Assistance Needed: Yes ( assist with ADLs as needed)  Durable Medical Equipment: Not Applicable    Discharge Preparedness  [facility] resources needed?: None  What is your plan after discharge?:  (home with )  Do you have concerns about your hospital stay or care after discharge?: No  [facility] [address]  Prescription Coverage: Yes    Functional Assesment  Prior Functional Level: Needs Assist with Activities of Daily Living (needs intermittent assist with ADLs,  assist)    Finances  Source of Income: Other (comment) (working on getting SSDI reinstated)  Medical Insurance Coverage: Medicare    Vision / Hearing Impairment  Vision 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)  Hearing Impairment : No    Values / Beliefs / Concerns  Values /  Beliefs Concerns : No    Advance Directive  Advance Directive?: Living Will, DPOA for Health Care  Durable Power of  Name and Contact :  (Benoit Bird at: [phone])  Document Location:  (Home)    Domestic Abuse  Have you ever been the victim of abuse or violence?: No    Psychological Assessment  Suspect Abuse: No  Suspect Domestic Violence: No  History of Substance Abuse: None  History of Psychiatric Problems: Yes (anxiety/depression)  No Needs at this Time: No Needs at This Time    Discharge Risks or Barriers  Discharge risks or barriers?: No    Anticipated Discharge Information  Anticipated discharge disposition: Home

## 2017-01-06 NOTE — ED NOTES
Pt roomed from [facility]. In hospital gown, on monitor. C/o weakness, pain 9/10 on right side of body. Dependent edema in b/l hands and feet. Upper back slightly edematous.

## 2017-01-06 NOTE — PROGRESS NOTES
Bedside report received 0705. POC discussed with pt and ; Pt c/o of 9/10 pain with minimal relief from previous medications. MD paged for pain intervention; all questions answered at this time.

## 2017-01-06 NOTE — PROGRESS NOTES
Received from red  pod, aox4,  unsteady on her  feet. With joint pain, denies  sob. Call light within reach. Needs attended. Plan of care discussed and understood.

## 2017-01-06 NOTE — ED PROVIDER NOTES
"CHIEF COMPLAINT  Chief Complaint   Patient presents with   • Pain     Pt BIB self/family to triage by wheelchair. Pt reports yesterday having sudden episode of difficulty swallowing/choking, Pt reports waking up today with body pain in joints, pt describes pain to be \"screaming\". pt reports more affected right side, pt reports hx of stroke with minimal right sided defecits.        HPI  Enedelia Pang is a 46 y.o. female who presents if he is joint pain since 3 AM last night. Reports that it has been progressive in nature involving her upper and lower extremities. Also with swelling to her bilateral upper extremities. Increased weakness on her right upper and lower extremity however no involvement of the face. Reports that she has a history of stroke involving the right side. Has a contracted medical history including severe chronic migraines for which she has a nerve stimulator in place. Recently had her battery generator replaced approximately one month ago that is located in her left hip. Was on post operative prophylactic antibiotics which she completed. Denies any fevers. Reports adequate healing of the surgical site.    Has never had similar symptoms in the past. For the past month has had septal perforation of the nose of uncertain etiology. Is being worked up for possible autoimmune process    REVIEW OF SYSTEMS  See HPI for further details. All other systems are negative.     PAST MEDICAL HISTORY   has a past medical history of Migraine with aura, with intractable migraine, so stated, without mention of status migrainosus; H/O Clostridium difficile infection; Hydrocephalus; MRSA infection; Port-a-cath in place; Pituitary abnormality (HCC); Allergy, unspecified not elsewhere classified; Anxiety; Depression; Stroke (HCC); Cold; Snoring; and Pain.    SOCIAL HISTORY  Social History     Social History Main Topics   • Smoking status: Never Smoker    • Smokeless tobacco: Never Used   • Alcohol Use: No   • Drug " "Use: No   • Sexual Activity: Not on file       SURGICAL HISTORY   has past surgical history that includes cholecystectomy; tonsillectomy; appendectomy; tubal ligation; other; other neurological surg; irrigation & debridement neuro (N/A, 6/28/2015); lumbar exploration (N/A, 8/31/2015); and spinal cord stimulator (12/19/2016).    CURRENT MEDICATIONS  Home Medications     **Home medications have not yet been reviewed for this encounter**          ALLERGIES  Allergies   Allergen Reactions   • Prochlorperazine Swelling     Tongue swelling. Reaction as a teen. (compazine)   • Sumatriptan Unspecified     Historical=\"Heart stops.\" Reaction  between 1995 to 1997   • Vancomycin Shortness of Breath and Rash     Reaction in 2005.  D/W patient 8/31/15 - tolerated loading dose of vancomycin administered on 8/30/15 with some itching to chest with decreased infusion rate. Red Man Syndrome       PHYSICAL EXAM  VITAL SIGNS: /79 mmHg  Pulse 105  Temp(Src) 36.2 °C (97.2 °F)  Resp 16  Ht 1.6 m (5' 2.99\")  Wt 74.844 kg (165 lb)  BMI 29.24 kg/m2  SpO2 99%  LMP 11/18/2016 (Exact Date)  Pulse ox interpretation: I interpret this pulse ox as normal.  Constitutional: Alert in no apparent distress.  HENT: No signs of trauma, Bilateral external ears normal, Nose normal.   Eyes: Pupils are equal and reactive, Conjunctiva normal, Non-icteric.   Neck: Normal range of motion, No tenderness, Supple, No stridor.   Lymphatic: No lymphadenopathy noted.   Cardiovascular: Regular rate and rhythm, no murmurs.   Thorax & Lungs: Normal breath sounds, No respiratory distress, No wheezing, No chest tenderness.   Abdomen: Bowel sounds normal, Soft, No tenderness, No masses, No pulsatile masses. No peritoneal signs.  Skin: Warm, Dry, No erythema, No rash.   Back: No bony tenderness, No CVA tenderness.   Extremities: Intact distal pulses, swelling to bilateral hands, tenderness throughout all 4 extremities especially in the upper extremities, No " cyanosis  Musculoskeletal: Good range of motion in all major joints. No tenderness to palpation or major deformities noted.   Neurologic: Alert, 2 out of 5 strength in the right upper and lower extremities, 4-5 strength in the left upper and lower extremities, cranial nerves II through XII grossly intact  Psychiatric: Affect normal, Judgment normal, Mood normal.       DIAGNOSTIC STUDIES / PROCEDURES    LABS  Labs Reviewed   CBC WITH DIFFERENTIAL - Abnormal; Notable for the following:     Hemoglobin 9.3 (*)     Hematocrit 32.3 (*)     MCV 73.4 (*)     MCH 21.1 (*)     MCHC 28.8 (*)     RDW 53.1 (*)     Platelet Count 538 (*)     Neutrophils-Polys 78.80 (*)     Lymphocytes 15.00 (*)     All other components within normal limits    Narrative:     Indicate which anticoagulants the patient is on:->NONE   COMP METABOLIC PANEL - Abnormal; Notable for the following:     Co2 18 (*)     Anion Gap 12.0 (*)     Glucose 108 (*)     AST(SGOT) 65 (*)     ALT(SGPT) 67 (*)     Alkaline Phosphatase 217 (*)     All other components within normal limits    Narrative:     Indicate which anticoagulants the patient is on:->NONE   WESTERGREN SED RATE - Abnormal; Notable for the following:     Sed Rate Westergren 66 (*)     All other components within normal limits    Narrative:     Indicate which anticoagulants the patient is on:->NONE   CRP QUANTITIVE (NON-CARDIAC) - Abnormal; Notable for the following:     Stat C-Reactive Protein 7.92 (*)     All other components within normal limits    Narrative:     Indicate which anticoagulants the patient is on:->NONE   LIPASE    Narrative:     Indicate which anticoagulants the patient is on:->NONE   PROTHROMBIN TIME    Narrative:     Indicate which anticoagulants the patient is on:->NONE   APTT    Narrative:     Indicate which anticoagulants the patient is on:->NONE   HCG QUAL SERUM    Narrative:     Indicate which anticoagulants the patient is on:->NONE   CREATINE KINASE    Narrative:     Indicate  "which anticoagulants the patient is on:->NONE   TSH    Narrative:     Indicate which anticoagulants the patient is on:->NONE   FREE THYROXINE    Narrative:     Indicate which anticoagulants the patient is on:->NONE   ESTIMATED GFR    Narrative:     Indicate which anticoagulants the patient is on:->NONE   DIFFERENTIAL MANUAL    Narrative:     Indicate which anticoagulants the patient is on:->NONE   PERIPHERAL SMEAR REVIEW    Narrative:     Indicate which anticoagulants the patient is on:->NONE   PLATELET ESTIMATE    Narrative:     Indicate which anticoagulants the patient is on:->NONE   MORPHOLOGY    Narrative:     Indicate which anticoagulants the patient is on:->NONE   BLOOD CULTURE,HOLD   BLOOD CULTURE,HOLD   CK ISOENZYMES SERUM   ANTI-NUCLEAR ANTIBODY SERUM   COMPLEMENT C3   COMPLEMENT C4   RHEUMATOID ARTHRITIS FACTOR   RUBELLA ABS IGG   RUBELLA ABS IGM   HEP B SURFACE ANTIGEN   BLOOD CULTURE    Narrative:     Per Hospital Policy: Only change Specimen Src: to \"Line\" if  specified by physician order.   CENTROMERE AB, IGG   ANTI SCL-70 ABS   ANTI-NEUTROPHIL CYTOPLASMIC AB   BLOOD CULTURE,HOLD   URINALYSIS,CULTURE IF INDICATED   CHLAMYDIA/GC PCR URINE OR SWAB       RADIOLOGY  CT-HEAD W/O   Final Result      No acute intracranial abnormality.               INTERPRETING LOCATION:  [address] [place] [address], [facility]            COURSE & MEDICAL DECISION MAKING  Pertinent Labs & Imaging studies reviewed. (See chart for details)  46-year-old female presenting with joint pains for the past day. Involving upper and lower extremities however primarily involving the upper extremities. She also has concurrent weakness in the right upper and lower extremity. No involvement of the face. Has had a history of stroke with right-sided involvement in the past. CT of the head was performed that was unremarkable. This does not overall appear to be a stroke however. Regardless, the patient is outside of the window for alteplase administration " for stroke treatment. Symptoms have been ongoing for well over 12 hours.      Given the degree of the patient's symptoms including severe joint pain and right-sided weakness with inability to perform baseline tasks, will admit to the hospitalist service for further evaluation. Patient's symptoms most consistent with an autoimmune process however uncertain at this time. Elevated ESR and CRP. No obvious signs of infection.        Spoke with the hospitalist regarding admission and she agreed to admission.    FINAL IMPRESSION   Right-sided weakness   joint pain   joint swelling      Electronically signed by: Eric Smith, 1/5/2017 11:03 PM

## 2017-01-06 NOTE — PROGRESS NOTES
"Pt crying/whimppering. Stating her pain is a 10/10. Medicated per MAR; Emotional support provided. Pt states,\" The dilaudid did help some for an hour or two.\"  "

## 2017-01-06 NOTE — PROGRESS NOTES
Pt. Wants dilaudid instead of morphine, Dr. Jones notified, with orders to try morphine first, if no relief in 30 mins to call APN on call.

## 2017-01-06 NOTE — ED NOTES
Rechecked vitals. VSS. Tech apologized for wait, patient back in ER Lobby with no new needs. Patient instructed to notify Triage RN or ER Tech of any changes.

## 2017-01-06 NOTE — H&P
CHIEF COMPLAINT:  Joint pain and swelling as well as weakness.    PRIMARY MEDICAL PHYSICIAN:  Elliott Power MD    HISTORY OF PRESENT ILLNESS:  This is a 46-year-old female who comes in with   complaints of bilateral hand joint pain and swelling.  She also describes a   global weakness.  The patient states that she has been followed by ENT in the   past for a septal perforation which was deemed possibly secondary to an   autoimmune disorder such as Wegener's.  She was referred to a rheumatologist;   however, she has been unable to establish care secondary to insurance issues.    Her primary care physician, Dr. Power, has been attempting to work her up   for autoimmune disease.  Because of the involvement of the septum, a test for   Wegener's was obtained, but it was apparently inconclusive.  The patient also   reports that she has been having frequent sores on her skin for the last 1-2   years.  Her joint pain and swelling began in the last 24 hours.  Initially,   she states that it was every single joint that was involved; however, now it   is primarily her bilateral hands.  She had global weakness which has since   improved, although she does report continued weakness on her right hand.  She   denies any recent URIs, no fevers, chills, nausea, or vomiting.  She recently   underwent a battery replacement for her occipital stimulator on December 21st.    She has had some diarrhea, but otherwise no dysuria, no chest pain, no   constipation.  The patient's  also added to the history taking that the   patient has had some difficulty breathing at nighttime with dry mouth.      REVIEW OF SYSTEMS:  A full review of systems was completed and all pertinent   positives and negatives are included in the HPI above.    PAST MEDICAL HISTORY:  Migraine headaches.    PAST SURGICAL HISTORY:  1.  Occipital stimulator with multiple revisions and battery replacement.   2.  Cholecystectomy.   3.  Appendectomy.   4.   Tonsillectomy.   5.  Uterine ablation.   6.  Tubal ligation.   7.  Right knee repair.   8.  Ventricular shunt.     SOCIAL HISTORY:  No tobacco, alcohol, or illicit drugs.  The patient is   unemployed.     FAMILY HISTORY:  Negative for autoimmune diseases, negative for heart disease.    ALLERGIES:  PROCHLORPERAZINE, SUMATRIPTAN, VANCOMYCIN.    HOME MEDICATIONS:   1.  Oxycodone 10 mg p.o. q. 4 hours p.r.n. moderate to severe pain.   2.  Diamox 250 mg p.o. b.i.d.    3.  Multivitamin.    4.  Vitamin B12.    5.  Benadryl over-the-counter.    6.  Gabapentin 400 mg p.o. t.i.d.    7.  Cymbalta 60 mg p.o. b.i.d.      PHYSICAL EXAMINATION:  VITAL SIGNS:  Temperature 97.2, heart rate 105, respirations 16 and blood   pressure 119/79.  Patient is saturating at 99% on room air.  GENERAL:  This is a middle-aged white female who is in no acute distress.  HEENT:  Normocephalic, atraumatic.  EOMI, moist mucous membranes.  NECK:  Supple, there is no JVD.  There is no supraclavicular adenopathy.  CARDIOVASCULAR:  Positive S1, S2, tachycardia, no murmurs appreciated.  PULMONARY:  Clear to auscultation bilaterally.  No wheezes, rubs, or rhonchi   heard.  ABDOMEN:  Soft, nontender, nondistended.  Positive bowel sounds.  No   hepatosplenomegaly.  EXTREMITIES:  Warm, well-perfused:  No clubbing, cyanosis, or edema.    Capillary refill less than 2 seconds.  Distal pulses are intact.  Her   bilateral hands are quite swollen.  She has limited range of motion.  NEUROLOGIC:  A and O x3.  Cranial nerves II-XII grossly intact.    LABORATORY STUDIES:  WBC 7.7, hemoglobin 9.3, hematocrit 32.3 and platelets   538,  Sodium is 139, potassium 3.8, chloride 109, CO2 is 18, anion gap 12,   glucose 108, BUN 9 and creatinine 0.55.  GFR is greater than 60.  AST 65, ALT   67 and alkaline phosphatase is 217.  CRP is 7.92.  ESR is 66.    IMAGING:  CT scan of the head:  No acute intracranial abnormality.    ASSESSMENT AND PLAN:  This is a 46-year-old female who  comes in with   complaints of diffuse multijoint pain and swelling and global weakness.   1.  Acute polyarthritis:  The patient does likely have some type of   inflammatory process ongoing at this time in light of her elevated   inflammatory markers.  She has been worked up by her primary care physician   for Wegener's recently given her septal defect, but the tests were apparently   inconclusive.  The differential diagnoses would be quite broad and can include   gout, or pseudogout, although this is less likely given the involvement of   multiple joints.  Infection or connective tissue disease as well as vasculitis   are also possibilities.  At this point, I will begin workup for her, many of   which will be sent out and she will ultimately need to follow up with her   primary care provider to discuss these results.  I am casting the net quite   wide and I am checking for gonococcal arthritis, hepatitis infection, parvo   infection, or rubella infection.  I will also work her up for rheumatoid   arthritis, SLE, scleroderma, and Sjogren syndrome.  Given the fact that she is   having an acute flare now, I will also check her for Wegener's.  In the   meantime, I will continue symptomatic management with pain control.  I will   also give her a trial of steroids with Decadron.  If there is no improvement,   we will consider an inpatient rheumatology consultation.  I have ordered labs   for complement 3 and 4, hepatitis, rheumatoid factor, rubella, CK,   anti-scleroderma, anticentromere, anti-double stranded DNA, anticentromere, and   ANCA.  2.  Migraine headache:  Continue home Diamox.    DISPOSITION:  Medical floor.    PROPHYLAXIS:  Sequential compression devices for DVT prophylaxis, no PPI   indicated, stool softeners ordered.    CODE:  Full.       ____________________________________     TANIA FONTANEZ MD    DTC / NTS    DD:  01/06/2017 03:36:42  DT:  01/06/2017 04:13:11    D#:  [id number]  Job#:  [id number]

## 2017-01-06 NOTE — PROGRESS NOTES
Pt upset dilaudid is no longer available. Per pt take IM Toradol sots at home without relief. Pt crying, verbalizing frustration. Wants to talk to the MD.

## 2017-01-06 NOTE — PROGRESS NOTES
Pt c/o of migraine per pt at home she takes 50 mg benadryl. Pt also upset with medication changes and would like to see MD., Dr. Becki malin for orders.

## 2017-01-07 LAB
ANION GAP SERPL CALC-SCNC: 9 MMOL/L (ref 0–11.9)
BUN SERPL-MCNC: 12 MG/DL (ref 8–22)
C TRACH DNA SPEC QL NAA+PROBE: NEGATIVE
CALCIUM SERPL-MCNC: 9.5 MG/DL (ref 8.5–10.5)
CHLORIDE SERPL-SCNC: 112 MMOL/L (ref 96–112)
CO2 SERPL-SCNC: 17 MMOL/L (ref 20–33)
CREAT SERPL-MCNC: 0.51 MG/DL (ref 0.5–1.4)
ERYTHROCYTE [DISTWIDTH] IN BLOOD BY AUTOMATED COUNT: 53.7 FL (ref 35.9–50)
FERRITIN SERPL-MCNC: 15.8 NG/ML (ref 10–291)
GFR SERPL CREATININE-BSD FRML MDRD: >60 ML/MIN/1.73 M 2
GLUCOSE SERPL-MCNC: 106 MG/DL (ref 65–99)
HCT VFR BLD AUTO: 29.8 % (ref 37–47)
HGB BLD-MCNC: 8.5 G/DL (ref 12–16)
MCH RBC QN AUTO: 21.1 PG (ref 27–33)
MCHC RBC AUTO-ENTMCNC: 28.5 G/DL (ref 33.6–35)
MCV RBC AUTO: 74.1 FL (ref 81.4–97.8)
N GONORRHOEA DNA SPEC QL NAA+PROBE: NEGATIVE
PLATELET # BLD AUTO: 578 K/UL (ref 164–446)
PMV BLD AUTO: 10 FL (ref 9–12.9)
POTASSIUM SERPL-SCNC: 3.6 MMOL/L (ref 3.6–5.5)
RBC # BLD AUTO: 4.02 M/UL (ref 4.2–5.4)
SODIUM SERPL-SCNC: 138 MMOL/L (ref 135–145)
SPECIMEN SOURCE: NORMAL
WBC # BLD AUTO: 13.3 K/UL (ref 4.8–10.8)

## 2017-01-07 PROCEDURE — 85027 COMPLETE CBC AUTOMATED: CPT

## 2017-01-07 PROCEDURE — A9270 NON-COVERED ITEM OR SERVICE: HCPCS | Performed by: HOSPITALIST

## 2017-01-07 PROCEDURE — 96376 TX/PRO/DX INJ SAME DRUG ADON: CPT

## 2017-01-07 PROCEDURE — 86747 PARVOVIRUS ANTIBODY: CPT

## 2017-01-07 PROCEDURE — 36415 COLL VENOUS BLD VENIPUNCTURE: CPT

## 2017-01-07 PROCEDURE — 770006 HCHG ROOM/CARE - MED/SURG/GYN SEMI*

## 2017-01-07 PROCEDURE — 99233 SBSQ HOSP IP/OBS HIGH 50: CPT | Performed by: HOSPITALIST

## 2017-01-07 PROCEDURE — 80048 BASIC METABOLIC PNL TOTAL CA: CPT

## 2017-01-07 PROCEDURE — 700111 HCHG RX REV CODE 636 W/ 250 OVERRIDE (IP): Performed by: HOSPITALIST

## 2017-01-07 PROCEDURE — 82728 ASSAY OF FERRITIN: CPT

## 2017-01-07 PROCEDURE — 96372 THER/PROPH/DIAG INJ SC/IM: CPT

## 2017-01-07 PROCEDURE — 700102 HCHG RX REV CODE 250 W/ 637 OVERRIDE(OP): Performed by: HOSPITALIST

## 2017-01-07 RX ORDER — FERROUS GLUCONATE 324(38)MG
324 TABLET ORAL 2 TIMES DAILY WITH MEALS
Status: DISCONTINUED | OUTPATIENT
Start: 2017-01-07 | End: 2017-01-12 | Stop reason: HOSPADM

## 2017-01-07 RX ADMIN — DULOXETINE HYDROCHLORIDE 60 MG: 60 CAPSULE, DELAYED RELEASE ORAL at 08:47

## 2017-01-07 RX ADMIN — FERROUS GLUCONATE 324 MG: 324 TABLET ORAL at 12:00

## 2017-01-07 RX ADMIN — HYDROMORPHONE HYDROCHLORIDE 0.5 MG: 1 INJECTION, SOLUTION INTRAMUSCULAR; INTRAVENOUS; SUBCUTANEOUS at 23:55

## 2017-01-07 RX ADMIN — PREDNISONE 40 MG: 20 TABLET ORAL at 08:47

## 2017-01-07 RX ADMIN — HYDROMORPHONE HYDROCHLORIDE 0.5 MG: 1 INJECTION, SOLUTION INTRAMUSCULAR; INTRAVENOUS; SUBCUTANEOUS at 15:42

## 2017-01-07 RX ADMIN — HYDROMORPHONE HYDROCHLORIDE 0.5 MG: 1 INJECTION, SOLUTION INTRAMUSCULAR; INTRAVENOUS; SUBCUTANEOUS at 13:31

## 2017-01-07 RX ADMIN — HYDROMORPHONE HYDROCHLORIDE 0.5 MG: 1 INJECTION, SOLUTION INTRAMUSCULAR; INTRAVENOUS; SUBCUTANEOUS at 00:33

## 2017-01-07 RX ADMIN — GABAPENTIN 400 MG: 400 CAPSULE ORAL at 15:42

## 2017-01-07 RX ADMIN — HYDROMORPHONE HYDROCHLORIDE 0.5 MG: 1 INJECTION, SOLUTION INTRAMUSCULAR; INTRAVENOUS; SUBCUTANEOUS at 04:25

## 2017-01-07 RX ADMIN — GABAPENTIN 400 MG: 400 CAPSULE ORAL at 08:47

## 2017-01-07 RX ADMIN — HYDROMORPHONE HYDROCHLORIDE 0.5 MG: 1 INJECTION, SOLUTION INTRAMUSCULAR; INTRAVENOUS; SUBCUTANEOUS at 08:40

## 2017-01-07 RX ADMIN — HYDROMORPHONE HYDROCHLORIDE 0.5 MG: 1 INJECTION, SOLUTION INTRAMUSCULAR; INTRAVENOUS; SUBCUTANEOUS at 19:48

## 2017-01-07 RX ADMIN — ACETAZOLAMIDE 500 MG: 500 CAPSULE, EXTENDED RELEASE ORAL at 19:48

## 2017-01-07 RX ADMIN — HYDROMORPHONE HYDROCHLORIDE 0.5 MG: 1 INJECTION, SOLUTION INTRAMUSCULAR; INTRAVENOUS; SUBCUTANEOUS at 02:26

## 2017-01-07 RX ADMIN — FERROUS GLUCONATE 324 MG: 324 TABLET ORAL at 17:48

## 2017-01-07 RX ADMIN — DULOXETINE HYDROCHLORIDE 60 MG: 60 CAPSULE, DELAYED RELEASE ORAL at 19:48

## 2017-01-07 RX ADMIN — HEPARIN SODIUM 5000 UNITS: 5000 INJECTION INTRAVENOUS; SUBCUTANEOUS at 06:38

## 2017-01-07 RX ADMIN — HYDROMORPHONE HYDROCHLORIDE 0.5 MG: 1 INJECTION, SOLUTION INTRAMUSCULAR; INTRAVENOUS; SUBCUTANEOUS at 17:48

## 2017-01-07 RX ADMIN — ACETAZOLAMIDE 500 MG: 500 CAPSULE, EXTENDED RELEASE ORAL at 08:47

## 2017-01-07 RX ADMIN — GABAPENTIN 400 MG: 400 CAPSULE ORAL at 19:49

## 2017-01-07 RX ADMIN — CAPSICUM OLEORESIN: 0.25 CREAM TOPICAL at 08:48

## 2017-01-07 RX ADMIN — HYDROMORPHONE HYDROCHLORIDE 0.5 MG: 1 INJECTION, SOLUTION INTRAMUSCULAR; INTRAVENOUS; SUBCUTANEOUS at 21:48

## 2017-01-07 RX ADMIN — HYDROMORPHONE HYDROCHLORIDE 0.5 MG: 1 INJECTION, SOLUTION INTRAMUSCULAR; INTRAVENOUS; SUBCUTANEOUS at 06:36

## 2017-01-07 RX ADMIN — HYDROMORPHONE HYDROCHLORIDE 0.5 MG: 1 INJECTION, SOLUTION INTRAMUSCULAR; INTRAVENOUS; SUBCUTANEOUS at 11:28

## 2017-01-07 RX ADMIN — CAPSICUM OLEORESIN: 0.25 CREAM TOPICAL at 15:00

## 2017-01-07 ASSESSMENT — ENCOUNTER SYMPTOMS
DIZZINESS: 0
DIARRHEA: 0
VOMITING: 0
NAUSEA: 0
FEVER: 0
PALPITATIONS: 0
MYALGIAS: 0
CONSTIPATION: 0
CHILLS: 0
DOUBLE VISION: 0
SHORTNESS OF BREATH: 0
WEAKNESS: 1
FOCAL WEAKNESS: 0
HEADACHES: 0
ABDOMINAL PAIN: 0
WEIGHT LOSS: 0
BLURRED VISION: 0
COUGH: 0

## 2017-01-07 ASSESSMENT — PAIN SCALES - GENERAL
PAINLEVEL_OUTOF10: 7
PAINLEVEL_OUTOF10: 9
PAINLEVEL_OUTOF10: 7
PAINLEVEL_OUTOF10: 8
PAINLEVEL_OUTOF10: 7
PAINLEVEL_OUTOF10: 8
PAINLEVEL_OUTOF10: 5
PAINLEVEL_OUTOF10: 8

## 2017-01-07 NOTE — PROGRESS NOTES
Pt sleeping; no signs of distress. No change from previous assessment. Fall precautions in place. Will continue to monitor.

## 2017-01-07 NOTE — PROGRESS NOTES
Assessment done, Pt educated on plan of care waiting on dr rounds  Patient resting in bed, no signs of distress,  no complains of pain at this time. Call light within reach,  side rails up, will monitor. Condition stable

## 2017-01-07 NOTE — PROGRESS NOTES
"Hospital Medicine Interval Note  Date of Service:  1/6/2017    Chief Complaint  46 y.o.-year-old female admitted 1/5/2017 with joint pain and swelling    Interval Problem Update  1/6 - discussed with patient and  at length. Further history somewhat difficult to obtain as she is unclear on a lot of the details. Previous Wegener's workup \"negative\" and told that she needed to wait for a flare. No SOB. B/L hand swelling/tenderness with 8/10 throbbing pain. Prior difficulty swallowing, since resolved.     Consultants/Specialty  Andrade, rheumatology    Disposition  Pending     Review of Systems   Constitutional: Positive for malaise/fatigue. Negative for fever, chills and weight loss.   HENT:        Prior septal perforation   Eyes: Negative for blurred vision and double vision.   Respiratory: Negative for cough and shortness of breath.    Cardiovascular: Negative for chest pain and palpitations.   Gastrointestinal: Negative for nausea, vomiting, abdominal pain, diarrhea and constipation.   Genitourinary: Negative for dysuria.   Musculoskeletal: Negative for myalgias.   Skin: Positive for rash (historical). Negative for itching.   Neurological: Positive for weakness. Negative for dizziness, focal weakness and headaches.   All other systems reviewed and are negative.     Physical Exam Laboratory/Imaging   Filed Vitals:    01/06/17 0315 01/06/17 0339 01/06/17 0800 01/06/17 1240   BP:  124/67 136/64 124/71   Pulse: 97 90 98 93   Temp:  36.3 °C (97.4 °F) 36.3 °C (97.3 °F) 36.8 °C (98.3 °F)   Resp: 21 18 18 18   Height:       Weight:  72.4 kg (159 lb 9.8 oz)     SpO2: 92% 98% 98% 97%     Physical Exam   Constitutional: She is oriented to person, place, and time. She appears well-developed and well-nourished.   HENT:   Head: Normocephalic and atraumatic.   Mouth/Throat: Oropharynx is clear and moist.   Eyes: Conjunctivae and EOM are normal. Pupils are equal, round, and reactive to light. No scleral icterus.   Neck: Normal " range of motion. Neck supple. No tracheal deviation present. No thyromegaly present.   Cardiovascular: Normal rate, regular rhythm, normal heart sounds and intact distal pulses.    No murmur heard.  Pulmonary/Chest: Effort normal and breath sounds normal. No respiratory distress. She has no wheezes.   Abdominal: Soft. Bowel sounds are normal. She exhibits no distension. There is no tenderness.   Musculoskeletal: Normal range of motion. She exhibits no edema or tenderness.   PIP and MCP joints swollen, not boggy but tender on all 10 fingers. Scarring on back of hands. No rash. Cap refill good. Skin pink. Decreased ROM due to pain/swelling   Lymphadenopathy:     She has no cervical adenopathy.        Right: No supraclavicular adenopathy present.        Left: No supraclavicular adenopathy present.   Neurological: She is alert and oriented to person, place, and time. No cranial nerve deficit.   Skin: Skin is warm and dry.   Vitals reviewed.   Lab Results   Component Value Date/Time    WBC 7.7 01/05/2017 11:22 PM    HEMOGLOBIN 9.3* 01/05/2017 11:22 PM    HEMATOCRIT 32.3* 01/05/2017 11:22 PM    PLATELET COUNT 538* 01/05/2017 11:22 PM     Lab Results   Component Value Date/Time    SODIUM 139 01/05/2017 11:22 PM    POTASSIUM 3.8 01/05/2017 11:22 PM    CHLORIDE 109 01/05/2017 11:22 PM    CO2 18* 01/05/2017 11:22 PM    GLUCOSE 108* 01/05/2017 11:22 PM    BUN 9 01/05/2017 11:22 PM    CREATININE 0.55 01/05/2017 11:22 PM      Assessment/Plan    Swelling of joint of hand  Assessment & Plan  Discussed with Dr. Andrade of Rheumatology who will see the patient tomorrow, agrees with Prednisone for now  Discussed with patient at length... Further history is not really that clear. Does seem that she had some difficulty swallowing and some sort of rash/lesions on her hands and feet, and a septal perforation.  Seems rheumatologic in nature. Labs so far are inconclusive, but CRP and ESR are ?.  Appreciate rheumatology  assistance.  Significant pain necessitates continued hospitalization; patient is really unable to take care of herself whatsoever.     Complicated migraine  Assessment & Plan  Diamox, prn gabapentin and benadryn    Chronic pain syndrome  Assessment & Plan  Gabapentin, cymbalta, PRNs.     Labs reviewed and Medications reviewed        DVT Prophylaxis: Heparin    Ulcer prophylaxis: Not indicated

## 2017-01-07 NOTE — PROGRESS NOTES
Pt c/o joint pain, 10/10. Prn pain med given per mar. Swollen hands and joints noted. Family at bedside. poc discussed with pt. Call light within reach. Calls for assistance. Hourly rounding in use

## 2017-01-07 NOTE — CONSULTS
"Chief Complaint- hand arthralgia    Chief Complaint   Patient presents with   • Pain     Pt BIB self/family to triage by wheelchair. Pt reports yesterday having sudden episode of difficulty swallowing/choking, Pt reports waking up today with body pain in joints, pt describes pain to be \"screaming\". pt reports more affected right side, pt reports hx of stroke with minimal right sided defecits.      Enedelia Pang is a 46 y.o. female here as a new Rheumatology Consult referred by Dr. Connell.    HPI:     Ms. Enedelia Pang presents with a  chief complaint of joint pain and swelling as well as weakness in her hands and feet.. She states that on the morning of January4th she woke up at 3 AM with diffuse pain and stiffness. She was not able to support her own weight and required her  to help assist her to the restroom. In addition she noticed hand swelling. She noted pain in her back or hips her hands or elbows or wrists or ankles. She reports never having these episodes before with involvement of joint pains. She has had episodes of hand swelling past that is followed with a breakout of the rash that has been occurring for the past year and half.     She reports about 2 days prior to this episode she had been noticing that she was choking on her saliva. She recently had a battery change for her occipital stimulator. The change this time was performed at a different location. Secondary to the pain from her procedure she had not been sleeping well.    2 months prior she also had an episode of recurrent epistaxis that lasted for 2 weeks. She reports multiple episodes several times a day of prolonged nosebleeds lasting about 10 minutes.  She manages this by putting tissue up her nose. She was never seen or evaluated for this. It was during this period that she realized that she had a septum perforation.  She was evaluated by ENT who provided instructions on conservative management but advised her to return when " her symptoms recurred. Since then she has lost her insurance and has not been able to see ENT again. Her  feels that the diameter of her perforation has been increasing.    In the last few years she also has been experiencing recurrent rashes and infections. She has had a port infection. She she also has had recurrent rashes that start as a small papule and subsequently will blister. The blister then pops, scabs over and take a prolonged time to heal she estimates about 6 weeks. There are pictures in Epic that show well-demarcated circular lesions on her lower extremity as well as upper extremity. The lesions are about half a centimeter in diameter.     In evaluation through her primary care there was concern that she may have granulomatosis polyangiitis. However the patient reports her workup was inconclusive. She was not able to comment on what test was done nor was she able to determine if the test was a true negative.    In review of her previous records is noted that she had a prolonged history of headaches, migraine headaches, history of depression, complex migraine versus TIA.   From her hospitalization at [facility] in 2007 she was given the diagnosis of chronic pain especially with occipital neuralgia/migraine headaches. She also has had an MRI without contrast of the brain in February 2007 that was negative. Her MRV also completed empiric 2007 was also negative.  For her headaches she was first evaluated by neurology in March 2015 with a pre-existing diagnosis of migraine headaches since age 14 correlated with the onset of her periods. Neurology's evaluation noted in 2015 suggest their impression was her headaches her headache disorder represents a chronic migraine complicated by overuse of Percocet. She has had a history of abnormal visual field test from the past that she brings brought with her to clinic in 2015.  She has also had hospitalization for port infection in  "which one of the blood cultures grow Cronobackter Sakazakii. This was pansensitive.      Past Medical history: Her symptoms association with which she described included stabbing twitches 10 minutes. No history of heart disease and history of diabetes. History of hypertension, history of pseudotumor cerebri for which she had a shunt placed in 2011 for persistent increased ICP, she has a history of elevated LFTs back in. 2007 that showed an 8 mm splenic surface, she reports of pituitary lesion that has not been followed since 2003 secondary to an inability to obtain an MRI.  She also has a history of elevated liver enzymes. Thus far unclear where this workup is. She reports that it is thought to be secondary to her prolonged use of Depakote. She reports being on Depakote since age 19. However she has been off this medication for a few years and still reports episodes of transaminitis. She has been evaluated and reportedly was told that there was imaging to suggest there was \"swelling of the liver\".  She has not followed up with GI and is unclear what her status is. She does report a recent visit to [facility] for fever of 104 and at that time her liver enzymes were grossly elevated.    Past surgical history:  Patient has a history of stimulator replaced (she has had 8 revisions  and MRSA infections) and removal secondary to infection, appendectomy, right knee surgery, tonsillectomy, cholecystectomy, tubal ligation. Bilateral occipital block in 2006.    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 She recalls an episode of parvovirus she believes that the sometime last year.     Family history:    Her knowledge of her family history is limited. She her father had diabetes, heart issues. Her mother had migraines, chronic back issues, " and low thyroid. Her cousin had alopecia.      She denies any other rashes other than the intermittent blisters that form. She denies any photosensitivity. She does report hair loss and when she noticed large amounts of hair in her drain as well as her brash. Her  also reports that she has big clumps of hair loss when she runs her hands through her hair. She does report a course of intermittent fevers that has been ongoing. She reports fevers tend to occur about 7:53 PM at night they range from 100-104°F. Typically when it severe she may intermittently take a Tylenol or NSAID. Since her battery change she has not had any elevated fevers.    There are times when she does not take any medication and feels that her fevers will self resolve. She has had perhaps a drenching night sweats.  She denies any prior history of recurrent sinusitis however since noticing her septal perforation she has felt like there is more drainage of green mucus. She'll occasionally reports a hoarse voice a few times a week. She also has mild diarrhea that was initially thought to be C. difficile but subsequently C. difficile was treated and is still having mild diarrhea. She reports no chest pain no shortness of breath no oral ulcers, no dry eyes. She does report dry mouth. She denies any history of eye redness or eye inflammation. She denies any abdominal pain. She denies any hematuria or proteinuria.        Current medicines (including changes today)  Current Facility-Administered Medications   Medication Dose Route Frequency Provider Last Rate Last Dose   • oxycodone immediate release (ROXICODONE) tablet 10 mg  10 mg Oral Q4HRS PRN Renetta Jones M.D.   10 mg at 01/06/17 1208   • gabapentin (NEURONTIN) capsule 400 mg  400 mg Oral TID Renetta Jones M.D.   400 mg at 01/07/17 0847   • duloxetine (CYMBALTA) capsule 60 mg  60 mg Oral BID Renetta Jones M.D.   60 mg at 01/07/17 0847   • acetaZOLAMIDE SR (DIAMOX) capsule 500 mg  500 mg  Oral BID Renetta Jones M.D.   500 mg at 01/07/17 0847   • labetalol (NORMODYNE,TRANDATE) injection 10 mg  10 mg Intravenous Q4HRS PRN Renetta Jones M.D.       • polyethylene glycol/lytes (MIRALAX) PACKET 1 Packet  1 Packet Oral DAILY Renetta Jones M.D.   1 Packet at 01/06/17 0900   • acetaminophen (TYLENOL) tablet 650 mg  650 mg Oral Q6HRS PRN Renetta Jones M.D.       • capsicum oleoresin (TRIXAICIN) 0.025 % cream   Topical TID Herber Connell M.D.       • predniSONE (DELTASONE) tablet 40 mg  40 mg Oral DAILY Herber Connell M.D.   40 mg at 01/07/17 0847   • ketorolac (TORADOL) injection 30 mg  30 mg Intravenous Q6HRS PRN Herber Connell M.D.   30 mg at 01/06/17 0904   • HYDROmorphone (DILAUDID) injection 0.5 mg  0.5 mg Intravenous Q2HRS PRN Herber Connell M.D.   0.5 mg at 01/07/17 0840   • heparin injection 5,000 Units  5,000 Units Subcutaneous Q8HRS Herber Connell M.D.   5,000 Units at 01/07/17 0638     She  has a past medical history of Migraine with aura, with intractable migraine, so stated, without mention of status migrainosus; H/O Clostridium difficile infection; Hydrocephalus; MRSA infection; Port-a-cath in place; Pituitary abnormality (HCC); Allergy, unspecified not elsewhere classified; Anxiety; Depression; Stroke (HCC); Cold; Snoring; and Pain.  She  has past surgical history that includes cholecystectomy; tonsillectomy; appendectomy; tubal ligation; other; other neurological surg; irrigation & debridement neuro (N/A, 6/28/2015); lumbar exploration (N/A, 8/31/2015); and spinal cord stimulator (12/19/2016).  History reviewed. No pertinent family history.  No family status information on file.     Social History   Substance Use Topics   • Smoking status: Never Smoker    • Smokeless tobacco: Never Used   • Alcohol Use: No     Social History     Social History Narrative         ROS  Constitutional ROS: weakness, fevers  Eye ROS: No recent significant change in vision  Ear ROS: reports a  "buzzing sound  Mouth/Throat ROS: dry mouth  Neck ROS: Positive for hoarseness occassional  Pulmonary ROS: No shortness of breath  Cardiovascular ROS: No chest pain  Gastrointestinal ROS: No abdominal pain, No change in bowel habits  Musculoskeletal/Extremities ROS: No clubbing, Positive for arthritis , stiffness , swelling   Hematologic/Lymphatic ROS: Epistaxis 2 months ago  Skin/Integumentary ROS: Recurrent rashes that were thought to be infectious  Neurologic ROS: Chronic headaches       Objective:     Blood pressure 130/61, pulse 96, temperature 36.7 °C (98.1 °F), resp. rate 12, height 1.6 m (5' 2.99\"), weight 72.4 kg (159 lb 9.8 oz), last menstrual period 11/18/2016, SpO2 98 %. Body mass index is 28.28 kg/(m^2).  Physical Exam:    Filed Vitals:    01/06/17 2318 01/07/17 0403 01/07/17 0802 01/07/17 0804   BP: 127/70 128/72 144/67 130/61   Pulse: 98 96 83 96   Temp: 37.1 °C (98.7 °F) 36.6 °C (97.8 °F) 36.4 °C (97.6 °F) 36.7 °C (98.1 °F)   Resp: 16 15 21 12   Height:       Weight:       SpO2: 96% 97% 98% 98%    Body mass index is 28.28 kg/(m^2).    General/Constitutional: NAD she is lying supine, the room is dark. Patient is not diaphoretic.  Eyes: clear conjunctiva, no scleral icterus, EOMI, PERRL  Ears, Nose, Mouth,Throat: no oral ulcers, good dentition, moderately moist mucous membranes and moderate salivary pool, no discharge from ears bilaterally  Cardiovascular: regular rate and rhythm.  No murmurs, gallops, rubs  Respiratory: normal effort, unlabored respiration.  On auscultation no wheezes, rales, rhonchi.  Clear to auscultation.  GI: soft, NTTP no hepatosplenomegaly, nondistended  Musculoskeletal  Axial:  Thoracic no kyphosis  Upper Extremities:  Her hands are diffusely swollen. There is tenderness over the MCPs PIPs and CMC joint bilateral. She also has wrist swelling bilateral.  There is limited range of motion secondary to pain in flexion and extension of the wrist right greater than left. Her range of " motion in flexion and extension of her elbows are reasonable. However she reports pain on movement.  Muscle Strength: She has breakaway weakness on upper extremity testing of her deltoids. She has 5 out of 5 muscle strength in the biceps and triceps. She has about 4 out of 5 on the right upper extremity in testing of her deltoids and a 5 -/5of her right upper extremity in testing of the biceps and triceps. 5/5 in deltoids, biceps, triceps,.  She has difficulty making a full . On passive flexion I am able to flex her fingers at the PIP and MCP. However there is mild stiffness initiation particularly noted in the right hand index finger.  Lower Extremities:  No gross knee effusion bilateral  No MTP tenderness bilateral, but the feet on palpation are tender at the dorsal aspect.  Muscle Strength: 5/5 in dorsiflexion, plantarflexion, knee extension.  Hip flexion is 4 out of 5 in the right lower extremity. Hip flexion is 5 out of 5 in the left lower extremity    Skin: Limited skin exam.  There is scarring of her previous lesions diffusely over her forearms bilateral. I do appreciate a few lesions one in particular on the left forearm about half a centimeter in diameter that appears as a scab. Otherwise no rashes, no digital ulcerations, no alopecia, no tophi, no skin thickening, no nodules  Neuro: CN II-XII grossly intact, Alert, Oriented x 3, moves all four extremities  Psych: normal affect, normal mood, judgement appropriate, follows commands, responses are appropritae  Heme/Lymph: no cervical adenopathy    No results found for: QNTTBGOLD  Lab Results   Component Value Date/Time    HEPATITIS B CORS AB,IGM Negative 07/03/2015 12:25 AM    HEPATITIS B SURFACE ANTIGEN Negative 01/06/2017 04:12 AM     Lab Results   Component Value Date/Time    HEPATITIS C ANTIBODY Negative 07/03/2015 12:25 AM     Lab Results   Component Value Date/Time    SODIUM 138 01/07/2017 04:20 AM    POTASSIUM 3.6 01/07/2017 04:20 AM    CHLORIDE  112 01/07/2017 04:20 AM    CO2 17* 01/07/2017 04:20 AM    GLUCOSE 106* 01/07/2017 04:20 AM    BUN 12 01/07/2017 04:20 AM    CREATININE 0.51 01/07/2017 04:20 AM      Lab Results   Component Value Date/Time    WBC 13.3* 01/07/2017 04:20 AM    RBC 4.02* 01/07/2017 04:20 AM    HEMOGLOBIN 8.5* 01/07/2017 04:20 AM    HEMATOCRIT 29.8* 01/07/2017 04:20 AM    MCV 74.1* 01/07/2017 04:20 AM    MCH 21.1* 01/07/2017 04:20 AM    MCHC 28.5* 01/07/2017 04:20 AM    MPV 10.0 01/07/2017 04:20 AM    NEUTROPHILS-POLYS 78.80* 01/05/2017 11:22 PM    LYMPHOCYTES 15.00* 01/05/2017 11:22 PM    MONOCYTES 5.30 01/05/2017 11:22 PM    EOSINOPHILS 0.00 01/05/2017 11:22 PM    BASOPHILS 0.00 01/05/2017 11:22 PM    HYPOCHROMIA 1+ 01/05/2017 11:22 PM    ANISOCYTOSIS 1+ 01/05/2017 11:22 PM      Lab Results   Component Value Date/Time    CALCIUM 9.5 01/07/2017 04:20 AM    AST(SGOT) 65* 01/05/2017 11:22 PM    ALT(SGPT) 67* 01/05/2017 11:22 PM    ALKALINE PHOSPHATASE 217* 01/05/2017 11:22 PM    TOTAL BILIRUBIN 0.5 01/05/2017 11:22 PM    ALBUMIN 4.0 01/05/2017 11:22 PM    TOTAL PROTEIN 7.5 01/05/2017 11:22 PM     Lab Results   Component Value Date/Time    RHEUMATOID FACTOR -NEPH- <10 01/06/2017 04:12 AM     Lab Results   Component Value Date/Time    SED RATE WESTERGREN 66* 01/05/2017 11:22 PM    STAT C-REACTIVE PROTEIN 7.92* 01/05/2017 11:22 PM     No results found for: CAROL BENITEZVTINTERP  Lab Results   Component Value Date/Time    C3 COMPLEMENT 178.0 01/06/2017 04:12 AM    COMPLEMENT C4 37.0 01/06/2017 04:12 AM     No results found for: ANADIRECT, ANTIDNADS, RNPAB, SMITHAB, CRWEAQL83, SSAROAB, SSBLAAB, COVGJC1VA, CENTROMBAB  Lab Results   Component Value Date/Time    C3 COMPLEMENT 178.0 01/06/2017 04:12 AM     Lab Results   Component Value Date/Time    COLOR Colorless 01/06/2017 12:30 PM    SPECIFIC GRAVITY 1.006 01/06/2017 12:30 PM    PH 7.5 01/06/2017 12:30 PM    GLUCOSE Negative 01/06/2017 12:30 PM    KETONES Negative 01/06/2017 12:30 PM     PROTEIN Negative 01/06/2017 12:30 PM     No results found for: TOTPROTUR, TOTALVOLUME, SAFMDEYK43  No results found for: SSA60, SSA52  No results found for: HBA1C  Lab Results   Component Value Date/Time    CPK TOTAL 46 01/05/2017 11:22 PM     No results found for: G6PD  No results found for: ANKI59ZSJD  No results found for: ACESERUM  No results found for: 25HYDROXY  No results found for: TSH, FREEDIR  Lab Results   Component Value Date/Time    TSH 0.740 01/05/2017 11:22 PM    FREE T-4 0.76 01/05/2017 11:22 PM     No results found for: MICROSOMALA, ANTITHYROGL  No results found for: IGGLYMEABS  No results found for: ANTIMITOCHO, FACTIN  No results found for: IGA, TTRANSIGA, ENDOIGA  No results found for: FLTYPE, CRYSTALSBDF  No results found for: ISTATICAL  No results found for: ISTATCREAT  No results found for: CTELOPEP  No results found for: GBMABG  No results found for: PTHINTACT          Results for orders placed during the hospital encounter of 10/29/14   2-D ECHO W/DOPPLER (ECHOCARDIOGRAM COMP W/O CONT)        Results for orders placed during the hospital encounter of 03/21/15   LE ART/HARRIET             Assessment and Plan:  Ms. Enedelia Pang is a 46-year-old female presenting with acute onset of polyarthralgias affecting primarily the small hands but also diffusely her medium and large joints as well. On exam patient is uncomfortable but her swelling is localized primarily to her hands and wrists.     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 Thus far Rubella is negative.  Although this is acute and this is her first episode we can obtain a rheumatoid factor for screening though my suspicion at this point is low. Also would keep in mind the possibility of still's disease. Her onset of arthralgias early however she is reporting a  pattern of periodic fevers that I hope we will be able to capture during this hospitalization and transaminitis. Her labs during this admission did show an AST of 65 and ALT of 67. In 2015 when we review her liver enzymes they were normal. Typically in Still disease would expect a more chronic course of arthralgias. I like to see documented fevers that are not attributed to an infectious etiology.  In Ms. Pang case it is difficult as she has had recurrent infections. Typically we cannot obtain and ferritin. Please also trend the liver enzymes. To complete the workup polyarthralgias we should consider uric acid level although very unlikely given that there is no history of gallops, nor has there been any hot red swollen joints. I would also like to review a hand x-ray.    Recurrent rash and septal perforation  This could be autoimmune in etiology however there could be other causes as well. The patient reports that she had skin biopsies performed at [facility]. It would be helpful to review these pathology reports to see if there could be a leukocytoclastic vasculitis. Typically rashes of leukocytoclastic vasculitis do not blister on and I would be curious to results. Similarly with her septal perforation and a possible vasculitic rash we would consider autoimmune processes that would include granulomatosis polyangiitis, systemic lupus erythematosus, although not common this could be seen in rheumatoid arthritis. I agree with the current workup and we'll await the results of the  ANCA antibodies, MG antibodies. Given that her complements are normal it is less likely this is this could be a systemic lupus flare. I would also have low suspicion for scleroderma since she is not reporting any signs or symptoms of raynauds. Her UA does not show any proteinuria or red blood cells that would suggest kidney involvement.  It is possible that this could be a limited case of Wegener's however in such instances we would  want tissue to confirm the diagnosis. She may benefit from further evaluation by ENT as she is reporting worsening of her septal perforation.    Elevated ESR and CRP  Could be infectious vs autoimmune.    Anemia  She has had this chronic anemia ongoing unclear what the etiology could be. Would suggest primary team workup or review records from Dr. Power her primary care physician to assess if that hasn't ready been done.     At this point would continue prednisone 40 mg.  She reports improvement in her symptoms in the right hand. Would check Parvovirus, ferritin (will keep in mind ferritin is an acute phase reactant). Trend LFTs.  Follow-up on pending labs.        Patient was seen 60 minutes face-to-face (excluding time for procedures)  of which more than 50% the time was spent counseling the patient regarding  rheumatological conditions and care. Therapy was discussed in detail.  Thank you for this referral.

## 2017-01-07 NOTE — ASSESSMENT & PLAN NOTE
Discussed with Dr. Andrade of rheumatology   Will keep Prednisone for now since her hands are improving  Seems rheumatologic in nature. Labs so far are inconclusive, but CRP and ESR are ?.  Still's, Parvo considered (ferritin, parvo IgM/IgG ordered)  Appreciate rheumatology assistance.  Anticipate at least two more midnights of steroids and close montoring, IV analgesia

## 2017-01-08 ENCOUNTER — APPOINTMENT (OUTPATIENT)
Dept: RADIOLOGY | Facility: MEDICAL CENTER | Age: 47
DRG: 556 | End: 2017-01-08
Attending: INTERNAL MEDICINE
Payer: MEDICARE

## 2017-01-08 LAB
CENTROMERE IGG TITR SER IF: 2 AU/ML (ref 0–40)
ENA SCL70 IGG SER QL: 0 AU/ML (ref 0–40)
NUCLEAR IGG SER QL IA: NORMAL
RUBV IGM SER IA-ACNC: <10 AU/ML

## 2017-01-08 PROCEDURE — 770006 HCHG ROOM/CARE - MED/SURG/GYN SEMI*

## 2017-01-08 PROCEDURE — 73130 X-RAY EXAM OF HAND: CPT | Mod: RT

## 2017-01-08 PROCEDURE — 700102 HCHG RX REV CODE 250 W/ 637 OVERRIDE(OP): Performed by: HOSPITALIST

## 2017-01-08 PROCEDURE — A9270 NON-COVERED ITEM OR SERVICE: HCPCS | Performed by: HOSPITALIST

## 2017-01-08 PROCEDURE — 73130 X-RAY EXAM OF HAND: CPT | Mod: LT

## 2017-01-08 PROCEDURE — 99232 SBSQ HOSP IP/OBS MODERATE 35: CPT | Performed by: INTERNAL MEDICINE

## 2017-01-08 PROCEDURE — 700111 HCHG RX REV CODE 636 W/ 250 OVERRIDE (IP): Performed by: HOSPITALIST

## 2017-01-08 RX ORDER — IBUPROFEN 400 MG/1
600 TABLET ORAL EVERY 6 HOURS PRN
Status: DISCONTINUED | OUTPATIENT
Start: 2017-01-08 | End: 2017-01-09

## 2017-01-08 RX ADMIN — ACETAZOLAMIDE 500 MG: 500 CAPSULE, EXTENDED RELEASE ORAL at 07:57

## 2017-01-08 RX ADMIN — GABAPENTIN 400 MG: 400 CAPSULE ORAL at 14:44

## 2017-01-08 RX ADMIN — HEPARIN SODIUM 5000 UNITS: 5000 INJECTION INTRAVENOUS; SUBCUTANEOUS at 06:00

## 2017-01-08 RX ADMIN — HYDROMORPHONE HYDROCHLORIDE 0.5 MG: 1 INJECTION, SOLUTION INTRAMUSCULAR; INTRAVENOUS; SUBCUTANEOUS at 16:52

## 2017-01-08 RX ADMIN — HEPARIN SODIUM 5000 UNITS: 5000 INJECTION INTRAVENOUS; SUBCUTANEOUS at 21:52

## 2017-01-08 RX ADMIN — DULOXETINE HYDROCHLORIDE 60 MG: 60 CAPSULE, DELAYED RELEASE ORAL at 07:57

## 2017-01-08 RX ADMIN — HYDROMORPHONE HYDROCHLORIDE 0.5 MG: 1 INJECTION, SOLUTION INTRAMUSCULAR; INTRAVENOUS; SUBCUTANEOUS at 19:47

## 2017-01-08 RX ADMIN — FERROUS GLUCONATE 324 MG: 324 TABLET ORAL at 16:52

## 2017-01-08 RX ADMIN — HYDROMORPHONE HYDROCHLORIDE 0.5 MG: 1 INJECTION, SOLUTION INTRAMUSCULAR; INTRAVENOUS; SUBCUTANEOUS at 06:08

## 2017-01-08 RX ADMIN — PREDNISONE 40 MG: 20 TABLET ORAL at 07:56

## 2017-01-08 RX ADMIN — GABAPENTIN 400 MG: 400 CAPSULE ORAL at 07:56

## 2017-01-08 RX ADMIN — ACETAZOLAMIDE 500 MG: 500 CAPSULE, EXTENDED RELEASE ORAL at 21:52

## 2017-01-08 RX ADMIN — DULOXETINE HYDROCHLORIDE 60 MG: 60 CAPSULE, DELAYED RELEASE ORAL at 19:48

## 2017-01-08 RX ADMIN — FERROUS GLUCONATE 324 MG: 324 TABLET ORAL at 07:57

## 2017-01-08 RX ADMIN — HYDROMORPHONE HYDROCHLORIDE 0.5 MG: 1 INJECTION, SOLUTION INTRAMUSCULAR; INTRAVENOUS; SUBCUTANEOUS at 04:05

## 2017-01-08 RX ADMIN — CAPSICUM OLEORESIN: 0.25 CREAM TOPICAL at 15:00

## 2017-01-08 RX ADMIN — HYDROMORPHONE HYDROCHLORIDE 0.5 MG: 1 INJECTION, SOLUTION INTRAMUSCULAR; INTRAVENOUS; SUBCUTANEOUS at 10:16

## 2017-01-08 RX ADMIN — HYDROMORPHONE HYDROCHLORIDE 0.5 MG: 1 INJECTION, SOLUTION INTRAMUSCULAR; INTRAVENOUS; SUBCUTANEOUS at 21:52

## 2017-01-08 RX ADMIN — HYDROMORPHONE HYDROCHLORIDE 0.5 MG: 1 INJECTION, SOLUTION INTRAMUSCULAR; INTRAVENOUS; SUBCUTANEOUS at 08:04

## 2017-01-08 RX ADMIN — GABAPENTIN 400 MG: 400 CAPSULE ORAL at 19:48

## 2017-01-08 RX ADMIN — CAPSICUM OLEORESIN: 0.25 CREAM TOPICAL at 21:00

## 2017-01-08 RX ADMIN — HYDROMORPHONE HYDROCHLORIDE 0.5 MG: 1 INJECTION, SOLUTION INTRAMUSCULAR; INTRAVENOUS; SUBCUTANEOUS at 14:44

## 2017-01-08 RX ADMIN — HYDROMORPHONE HYDROCHLORIDE 0.5 MG: 1 INJECTION, SOLUTION INTRAMUSCULAR; INTRAVENOUS; SUBCUTANEOUS at 12:30

## 2017-01-08 RX ADMIN — CAPSICUM OLEORESIN: 0.25 CREAM TOPICAL at 09:00

## 2017-01-08 RX ADMIN — HYDROMORPHONE HYDROCHLORIDE 0.5 MG: 1 INJECTION, SOLUTION INTRAMUSCULAR; INTRAVENOUS; SUBCUTANEOUS at 02:05

## 2017-01-08 ASSESSMENT — PAIN SCALES - GENERAL
PAINLEVEL_OUTOF10: 7
PAINLEVEL_OUTOF10: 5
PAINLEVEL_OUTOF10: 8
PAINLEVEL_OUTOF10: 5
PAINLEVEL_OUTOF10: 8

## 2017-01-08 ASSESSMENT — ENCOUNTER SYMPTOMS
DIZZINESS: 0
ABDOMINAL PAIN: 0
FEVER: 0
NECK PAIN: 0
MYALGIAS: 0
NAUSEA: 0
FOCAL WEAKNESS: 0
LOSS OF CONSCIOUSNESS: 0
COUGH: 0
CHILLS: 0
BACK PAIN: 1
VOMITING: 0
DEPRESSION: 0
HEADACHES: 0
BLURRED VISION: 0

## 2017-01-08 NOTE — PROGRESS NOTES
Received report from night RN, assumed care at 0700. Pt A&OX4, able to specify needs. Pt with swelling to bilateral hands, pt with complaints of pain 8/10, medicated per MAR. Fall precautions in place, non-skid socks on feet, bed alarm refused, pt calls appropriately. POC discussed, communication board updated. Call light in reach, hourly rounding in place. Will continue to monitor.

## 2017-01-08 NOTE — PROGRESS NOTES
Patient AAOX4, ambulates independently, reports severe pain in hands and joints, diluadid 0.5mg IV given, hands are swollen.

## 2017-01-08 NOTE — PROGRESS NOTES
"Hospital Medicine Interval Note  Date of Service:  1/7/2017    Chief Complaint  46 y.o.-year-old female admitted 1/5/2017 with joint pain and swelling    Interval Problem Update  1/7 - discussed with rheumatology. Discussed with patient and  at the bedside. Feels like RIGHT hand is less swollen, almost able to be used. Pain a little better on this regimen; down to 6/10 in severity, sore, mostly in hands.     1/6 - discussed with patient and  at length. Further history somewhat difficult to obtain as she is unclear on a lot of the details. Previous Wegener's workup \"negative\" and told that she needed to wait for a flare. No SOB. B/L hand swelling/tenderness with 8/10 throbbing pain. Prior difficulty swallowing, since resolved.     Consultants/Specialty  Andrade, rheumatology    Disposition  Pending     Review of Systems   Constitutional: Positive for malaise/fatigue. Negative for fever, chills and weight loss.   HENT:        Prior septal perforation   Eyes: Negative for blurred vision and double vision.   Respiratory: Negative for cough and shortness of breath.    Cardiovascular: Negative for chest pain and palpitations.   Gastrointestinal: Negative for nausea, vomiting, abdominal pain, diarrhea and constipation.   Genitourinary: Negative for dysuria.   Musculoskeletal: Negative for myalgias.   Skin: Positive for rash (historical). Negative for itching.   Neurological: Positive for weakness. Negative for dizziness, focal weakness and headaches.   All other systems reviewed and are negative.     Physical Exam Laboratory/Imaging   Filed Vitals:    01/07/17 0403 01/07/17 0804 01/07/17 1227 01/07/17 1415   BP: 128/72 130/61 131/71 128/72   Pulse: 96 96 93 89   Temp: 36.6 °C (97.8 °F) 36.7 °C (98.1 °F) 36.7 °C (98 °F) 36.9 °C (98.5 °F)   Resp: 15 12 18 14   Height:       Weight:       SpO2: 97% 98% 97% 96%     Physical Exam   Constitutional: She is oriented to person, place, and time. She appears well-developed " and well-nourished.   HENT:   Head: Normocephalic and atraumatic.   Mouth/Throat: Oropharynx is clear and moist.   Eyes: Conjunctivae and EOM are normal. Pupils are equal, round, and reactive to light. No scleral icterus.   Neck: Normal range of motion. Neck supple. No tracheal deviation present. No thyromegaly present.   Cardiovascular: Normal rate, regular rhythm, normal heart sounds and intact distal pulses.    No murmur heard.  Pulmonary/Chest: Effort normal and breath sounds normal. No respiratory distress. She has no wheezes.   Abdominal: Soft. Bowel sounds are normal. She exhibits no distension. There is no tenderness.   Musculoskeletal: Normal range of motion. She exhibits no edema or tenderness.   PIP and MCP joints swollen, not boggy but tender on all 10 fingers. Scarring on back of hands. No rash. Cap refill good. Skin pink. Decreased ROM due to pain/swelling. Less swollen RIGHT hand than yesterday, LEFT still very swollen   Lymphadenopathy:     She has no cervical adenopathy.        Right: No supraclavicular adenopathy present.        Left: No supraclavicular adenopathy present.   Neurological: She is alert and oriented to person, place, and time. No cranial nerve deficit.   Skin: Skin is warm and dry.   Vitals reviewed.   Lab Results   Component Value Date/Time    WBC 13.3* 01/07/2017 04:20 AM    HEMOGLOBIN 8.5* 01/07/2017 04:20 AM    HEMATOCRIT 29.8* 01/07/2017 04:20 AM    PLATELET COUNT 578* 01/07/2017 04:20 AM     Lab Results   Component Value Date/Time    SODIUM 138 01/07/2017 04:20 AM    POTASSIUM 3.6 01/07/2017 04:20 AM    CHLORIDE 112 01/07/2017 04:20 AM    CO2 17* 01/07/2017 04:20 AM    GLUCOSE 106* 01/07/2017 04:20 AM    BUN 12 01/07/2017 04:20 AM    CREATININE 0.51 01/07/2017 04:20 AM      Assessment/Plan    Swelling of joint of hand  Assessment & Plan  Discussed with Dr. Andrade of rheumatology   Will keep Prednisone for now since her hands are improving  Seems rheumatologic in nature. Labs so  far are inconclusive, but CRP and ESR are ?.  Still's, Parvo considered (ferritin, parvo IgM/IgG ordered)  Appreciate rheumatology assistance.  Anticipate at least two more midnights of steroids and close montoring, IV analgesia    Complicated migraine  Assessment & Plan  Diamox, prn gabapentin and benadryn    Chronic pain syndrome  Assessment & Plan  Gabapentin, cymbalta, PRNs.       Labs reviewed and Medications reviewed        DVT Prophylaxis: Heparin    Ulcer prophylaxis: Not indicated

## 2017-01-08 NOTE — CARE PLAN
Problem: Safety  Goal: Will remain free from falls  Safety precautions in place, call light within reach, bed in lowest locked position communication board updated, hourly rounding in place.     Problem: Pain Management  Goal: Pain level will decrease to patient’s comfort goal  0.5 Dilaudid given Q2 hours to achieve pain management goals, patient currently resting in bed at this time.

## 2017-01-08 NOTE — CARE PLAN
Problem: Knowledge Deficit  Goal: Knowledge of disease process/condition, treatment plan, diagnostic tests, and medications will improve  Intervention: Assess knowledge level of disease process/condition, treatment plan, diagnostic tests, and medications  Patient educated about medications and potential side effects.      Problem: Pain Management  Goal: Pain level will decrease to patient’s comfort goal  Intervention: Follow pain managment plan developed in collaboration with patient and Interdisciplinary Team  Patient medicated per MAR for pain management.

## 2017-01-08 NOTE — PROGRESS NOTES
"Patient A0X4, calm and cooperative, states that she feels pain in \"all joints\" and pain is effectively relieved with IV dilauded Q2 hours, patient calls for pain medication appropriately, all needs met at this time.   "

## 2017-01-09 ENCOUNTER — TELEPHONE (OUTPATIENT)
Dept: RHEUMATOLOGY | Facility: PHYSICIAN GROUP | Age: 47
End: 2017-01-09

## 2017-01-09 LAB
25(OH)D3 SERPL-MCNC: 45 NG/ML (ref 30–100)
ANCA IGG TITR SER IF: NORMAL {TITER}
BASOPHILS # BLD AUTO: 0.2 % (ref 0–1.8)
BASOPHILS # BLD: 0.02 K/UL (ref 0–0.12)
CK BB CFR SERPL ELPH: 0 % (ref 0–0)
CK MACRO1 CFR SERPL: 0 % (ref 0–0)
CK MACRO2 CFR SERPL: 0 % (ref 0–0)
CK MB CFR SERPL ELPH: 0 % (ref 0–4)
CK MM CFR SERPL ELPH: 100 % (ref 96–100)
CK SERPL-CCNC: 40 U/L (ref 20–180)
DAT C3D-SP REAG RBC QL: NORMAL
DAT IGG-SP REAG RBC QL: NORMAL
EOSINOPHIL # BLD AUTO: 0.02 K/UL (ref 0–0.51)
EOSINOPHIL NFR BLD: 0.2 % (ref 0–6.9)
ERYTHROCYTE [DISTWIDTH] IN BLOOD BY AUTOMATED COUNT: 50 FL (ref 35.9–50)
FOLATE SERPL-MCNC: >23.6 NG/ML
HAV IGM SERPL QL IA: NEGATIVE
HBV CORE IGM SER QL: NEGATIVE
HBV SURFACE AG SER QL: NEGATIVE
HCT VFR BLD AUTO: 31.6 % (ref 37–47)
HCV AB SER QL: NEGATIVE
HGB BLD-MCNC: 9.6 G/DL (ref 12–16)
HGB RETIC QN AUTO: 22.1 PG/CELL (ref 29–35)
HIV 1+2 AB+HIV1 P24 AG SERPL QL IA: NON REACTIVE
IMM GRANULOCYTES # BLD AUTO: 0.05 K/UL (ref 0–0.11)
IMM GRANULOCYTES NFR BLD AUTO: 0.5 % (ref 0–0.9)
IMM RETICS NFR: 28.3 % (ref 9.3–17.4)
IRON SATN MFR SERPL: 2 % (ref 15–55)
IRON SERPL-MCNC: 10 UG/DL (ref 40–170)
LDH SERPL-CCNC: 107 U/L (ref 107–266)
LYMPHOCYTES # BLD AUTO: 3.42 K/UL (ref 1–4.8)
LYMPHOCYTES NFR BLD: 32.9 % (ref 22–41)
MCH RBC QN AUTO: 21.9 PG (ref 27–33)
MCHC RBC AUTO-ENTMCNC: 30.4 G/DL (ref 33.6–35)
MCV RBC AUTO: 72.1 FL (ref 81.4–97.8)
MONOCYTES # BLD AUTO: 0.42 K/UL (ref 0–0.85)
MONOCYTES NFR BLD AUTO: 4 % (ref 0–13.4)
NEUTROPHILS # BLD AUTO: 6.48 K/UL (ref 2–7.15)
NEUTROPHILS NFR BLD: 62.2 % (ref 44–72)
NRBC # BLD AUTO: 0 K/UL
NRBC BLD AUTO-RTO: 0 /100 WBC
PLATELET # BLD AUTO: 684 K/UL (ref 164–446)
PMV BLD AUTO: 9.5 FL (ref 9–12.9)
RBC # BLD AUTO: 4.38 M/UL (ref 4.2–5.4)
RETICS # AUTO: 0.09 M/UL (ref 0.04–0.06)
RETICS/RBC NFR: 2 % (ref 0.8–2.1)
TIBC SERPL-MCNC: 449 UG/DL (ref 250–450)
VIT B12 SERPL-MCNC: 314 PG/ML (ref 211–911)
WBC # BLD AUTO: 10.4 K/UL (ref 4.8–10.8)

## 2017-01-09 PROCEDURE — 80074 ACUTE HEPATITIS PANEL: CPT

## 2017-01-09 PROCEDURE — 700111 HCHG RX REV CODE 636 W/ 250 OVERRIDE (IP): Performed by: INTERNAL MEDICINE

## 2017-01-09 PROCEDURE — 86480 TB TEST CELL IMMUN MEASURE: CPT

## 2017-01-09 PROCEDURE — 83550 IRON BINDING TEST: CPT

## 2017-01-09 PROCEDURE — A9270 NON-COVERED ITEM OR SERVICE: HCPCS | Performed by: HOSPITALIST

## 2017-01-09 PROCEDURE — 36415 COLL VENOUS BLD VENIPUNCTURE: CPT

## 2017-01-09 PROCEDURE — 82607 VITAMIN B-12: CPT

## 2017-01-09 PROCEDURE — 770006 HCHG ROOM/CARE - MED/SURG/GYN SEMI*

## 2017-01-09 PROCEDURE — 83540 ASSAY OF IRON: CPT

## 2017-01-09 PROCEDURE — 86880 COOMBS TEST DIRECT: CPT

## 2017-01-09 PROCEDURE — 99232 SBSQ HOSP IP/OBS MODERATE 35: CPT | Performed by: INTERNAL MEDICINE

## 2017-01-09 PROCEDURE — 700111 HCHG RX REV CODE 636 W/ 250 OVERRIDE (IP)

## 2017-01-09 PROCEDURE — 87389 HIV-1 AG W/HIV-1&-2 AB AG IA: CPT

## 2017-01-09 PROCEDURE — 83010 ASSAY OF HAPTOGLOBIN QUANT: CPT

## 2017-01-09 PROCEDURE — 85046 RETICYTE/HGB CONCENTRATE: CPT

## 2017-01-09 PROCEDURE — 700102 HCHG RX REV CODE 250 W/ 637 OVERRIDE(OP)

## 2017-01-09 PROCEDURE — A9270 NON-COVERED ITEM OR SERVICE: HCPCS

## 2017-01-09 PROCEDURE — 700102 HCHG RX REV CODE 250 W/ 637 OVERRIDE(OP): Performed by: HOSPITALIST

## 2017-01-09 PROCEDURE — 700102 HCHG RX REV CODE 250 W/ 637 OVERRIDE(OP): Performed by: INTERNAL MEDICINE

## 2017-01-09 PROCEDURE — 83615 LACTATE (LD) (LDH) ENZYME: CPT

## 2017-01-09 PROCEDURE — 82746 ASSAY OF FOLIC ACID SERUM: CPT

## 2017-01-09 PROCEDURE — 700111 HCHG RX REV CODE 636 W/ 250 OVERRIDE (IP): Performed by: HOSPITALIST

## 2017-01-09 PROCEDURE — A9270 NON-COVERED ITEM OR SERVICE: HCPCS | Performed by: INTERNAL MEDICINE

## 2017-01-09 PROCEDURE — 85025 COMPLETE CBC W/AUTO DIFF WBC: CPT

## 2017-01-09 PROCEDURE — 700105 HCHG RX REV CODE 258

## 2017-01-09 PROCEDURE — 82306 VITAMIN D 25 HYDROXY: CPT

## 2017-01-09 RX ORDER — KETOROLAC TROMETHAMINE 10 MG/1
10 TABLET, FILM COATED ORAL EVERY 8 HOURS
Status: DISCONTINUED | OUTPATIENT
Start: 2017-01-09 | End: 2017-01-10

## 2017-01-09 RX ORDER — DIPHENHYDRAMINE HCL 25 MG
25 TABLET ORAL ONCE
Status: COMPLETED | OUTPATIENT
Start: 2017-01-09 | End: 2017-01-09

## 2017-01-09 RX ORDER — ACETAMINOPHEN 325 MG/1
650 TABLET ORAL ONCE
Status: COMPLETED | OUTPATIENT
Start: 2017-01-09 | End: 2017-01-09

## 2017-01-09 RX ORDER — CALCIUM CARBONATE 500(1250)
500 TABLET ORAL 2 TIMES DAILY WITH MEALS
Status: DISCONTINUED | OUTPATIENT
Start: 2017-01-09 | End: 2017-01-12 | Stop reason: HOSPADM

## 2017-01-09 RX ORDER — OXYCODONE HYDROCHLORIDE 5 MG/1
15 TABLET ORAL EVERY 4 HOURS PRN
Status: DISCONTINUED | OUTPATIENT
Start: 2017-01-09 | End: 2017-01-10

## 2017-01-09 RX ORDER — DIPHENHYDRAMINE HYDROCHLORIDE 50 MG/ML
25 INJECTION INTRAMUSCULAR; INTRAVENOUS ONCE
Status: COMPLETED | OUTPATIENT
Start: 2017-01-09 | End: 2017-01-09

## 2017-01-09 RX ADMIN — PREDNISONE 40 MG: 20 TABLET ORAL at 08:44

## 2017-01-09 RX ADMIN — HYDROMORPHONE HYDROCHLORIDE 0.5 MG: 1 INJECTION, SOLUTION INTRAMUSCULAR; INTRAVENOUS; SUBCUTANEOUS at 00:02

## 2017-01-09 RX ADMIN — OXYCODONE HYDROCHLORIDE 15 MG: 5 TABLET ORAL at 08:50

## 2017-01-09 RX ADMIN — IRON DEXTRAN 1000 MG: 50 INJECTION INTRAMUSCULAR; INTRAVENOUS at 16:11

## 2017-01-09 RX ADMIN — FERROUS GLUCONATE 324 MG: 324 TABLET ORAL at 16:09

## 2017-01-09 RX ADMIN — GABAPENTIN 400 MG: 400 CAPSULE ORAL at 16:09

## 2017-01-09 RX ADMIN — DIPHENHYDRAMINE HCL 25 MG: 25 TABLET ORAL at 13:39

## 2017-01-09 RX ADMIN — HYDROMORPHONE HYDROCHLORIDE 0.5 MG: 1 INJECTION, SOLUTION INTRAMUSCULAR; INTRAVENOUS; SUBCUTANEOUS at 10:46

## 2017-01-09 RX ADMIN — CAPSICUM OLEORESIN: 0.25 CREAM TOPICAL at 20:17

## 2017-01-09 RX ADMIN — CAPSICUM OLEORESIN: 0.25 CREAM TOPICAL at 09:00

## 2017-01-09 RX ADMIN — ACETAMINOPHEN 650 MG: 325 TABLET, FILM COATED ORAL at 13:39

## 2017-01-09 RX ADMIN — HYDROMORPHONE HYDROCHLORIDE 0.5 MG: 1 INJECTION, SOLUTION INTRAMUSCULAR; INTRAVENOUS; SUBCUTANEOUS at 15:01

## 2017-01-09 RX ADMIN — KETOROLAC TROMETHAMINE 10 MG: 10 TABLET, FILM COATED ORAL at 08:43

## 2017-01-09 RX ADMIN — FERROUS GLUCONATE 324 MG: 324 TABLET ORAL at 08:44

## 2017-01-09 RX ADMIN — OXYCODONE HYDROCHLORIDE 15 MG: 5 TABLET ORAL at 16:59

## 2017-01-09 RX ADMIN — HEPARIN SODIUM 5000 UNITS: 5000 INJECTION INTRAVENOUS; SUBCUTANEOUS at 06:00

## 2017-01-09 RX ADMIN — HYDROMORPHONE HYDROCHLORIDE 0.5 MG: 1 INJECTION, SOLUTION INTRAMUSCULAR; INTRAVENOUS; SUBCUTANEOUS at 02:20

## 2017-01-09 RX ADMIN — VITAMIN D, TAB 1000IU (100/BT) 1000 UNITS: 25 TAB at 18:00

## 2017-01-09 RX ADMIN — HYDROMORPHONE HYDROCHLORIDE 0.5 MG: 1 INJECTION, SOLUTION INTRAMUSCULAR; INTRAVENOUS; SUBCUTANEOUS at 06:43

## 2017-01-09 RX ADMIN — IRON DEXTRAN 25 MG: 50 INJECTION INTRAMUSCULAR; INTRAVENOUS at 13:36

## 2017-01-09 RX ADMIN — ACETAZOLAMIDE 500 MG: 500 CAPSULE, EXTENDED RELEASE ORAL at 08:44

## 2017-01-09 RX ADMIN — CALCIUM 500 MG: 500 TABLET ORAL at 18:00

## 2017-01-09 RX ADMIN — GABAPENTIN 400 MG: 400 CAPSULE ORAL at 08:43

## 2017-01-09 RX ADMIN — DULOXETINE HYDROCHLORIDE 60 MG: 60 CAPSULE, DELAYED RELEASE ORAL at 08:43

## 2017-01-09 RX ADMIN — CAPSICUM OLEORESIN: 0.25 CREAM TOPICAL at 15:00

## 2017-01-09 RX ADMIN — HYDROMORPHONE HYDROCHLORIDE 0.5 MG: 1 INJECTION, SOLUTION INTRAMUSCULAR; INTRAVENOUS; SUBCUTANEOUS at 04:32

## 2017-01-09 RX ADMIN — HEPARIN SODIUM 5000 UNITS: 5000 INJECTION INTRAVENOUS; SUBCUTANEOUS at 20:13

## 2017-01-09 RX ADMIN — KETOROLAC TROMETHAMINE 10 MG: 10 TABLET, FILM COATED ORAL at 20:12

## 2017-01-09 RX ADMIN — ACETAZOLAMIDE 500 MG: 500 CAPSULE, EXTENDED RELEASE ORAL at 20:15

## 2017-01-09 RX ADMIN — POLYETHYLENE GLYCOL 3350 1 PACKET: 17 POWDER, FOR SOLUTION ORAL at 08:43

## 2017-01-09 RX ADMIN — DULOXETINE HYDROCHLORIDE 60 MG: 60 CAPSULE, DELAYED RELEASE ORAL at 20:13

## 2017-01-09 RX ADMIN — KETOROLAC TROMETHAMINE 10 MG: 10 TABLET, FILM COATED ORAL at 13:39

## 2017-01-09 RX ADMIN — HEPARIN SODIUM 5000 UNITS: 5000 INJECTION INTRAVENOUS; SUBCUTANEOUS at 13:40

## 2017-01-09 RX ADMIN — GABAPENTIN 400 MG: 400 CAPSULE ORAL at 20:13

## 2017-01-09 RX ADMIN — HYDROMORPHONE HYDROCHLORIDE 0.5 MG: 1 INJECTION, SOLUTION INTRAMUSCULAR; INTRAVENOUS; SUBCUTANEOUS at 20:13

## 2017-01-09 ASSESSMENT — ENCOUNTER SYMPTOMS
WEIGHT LOSS: 0
BACK PAIN: 1
LOSS OF CONSCIOUSNESS: 0
DEPRESSION: 0
MYALGIAS: 0
HEADACHES: 0
NECK PAIN: 0
FOCAL WEAKNESS: 0
COUGH: 0
DOUBLE VISION: 0
ABDOMINAL PAIN: 0
DIZZINESS: 0

## 2017-01-09 ASSESSMENT — PAIN SCALES - GENERAL
PAINLEVEL_OUTOF10: 8
PAINLEVEL_OUTOF10: 7
PAINLEVEL_OUTOF10: 6
PAINLEVEL_OUTOF10: 7
PAINLEVEL_OUTOF10: 8

## 2017-01-09 ASSESSMENT — LIFESTYLE VARIABLES: DO YOU DRINK ALCOHOL: NO

## 2017-01-09 NOTE — PROGRESS NOTES
Patient alert/oriented,c/o pain  And given dilaudid iv every 2 hours non the dot,offered po pain meds but refused,fall risk and refused bed alarm.

## 2017-01-09 NOTE — PROGRESS NOTES
IV Iron Per Pharmacy Note    Patient Lean Body Weight:  52.4 kg  Reason for Iron Replacement: Iron Deficiency Anemia      Lab Results   Component Value Date/Time    WBC 10.4 01/09/2017 03:58 AM    RBC 4.38 01/09/2017 03:58 AM    HEMOGLOBIN 9.6* 01/09/2017 03:58 AM    HEMATOCRIT 31.6* 01/09/2017 03:58 AM    MCV 72.1* 01/09/2017 03:58 AM    MCH 21.9* 01/09/2017 03:58 AM    MCHC 30.4* 01/09/2017 03:58 AM    MPV 9.5 01/09/2017 03:58 AM       Recent Labs      01/07/17   1125  01/09/17   0358   FERRITIN  15.8   --    FOLATE   --   >23.6   IRON   --   10*         Recent Labs      01/07/17   0420   CREATININE  0.51          Assessment/Plan:  1. IV Iron Indicated.   2. Give Iron Dextran 25 mg IV test dose following diphenhydramine/acetaminophen premeds over 30 minutes per protocol.  3. If no reaction (Anaphylaxis, Hypotension/Hypertension, N/V/D, Chest pain/Back Pain, Urticaria/Pruritis) in the next hour, proceed to full dose. Nursing to call the pharmacy IV room at ext. [phone] for full dose.  4. Full dose: Iron Dextran 1000 mg IV over 4 hours. Continue to monitor for delayed ADR including: Arthralgia/myalgia, Headache/backache, chills/dizziness/malaise, moderate to high fever and n/v.      Linda Coburn,PharmD

## 2017-01-09 NOTE — DISCHARGE PLANNING
Pulled patient's Advance Directive off of scanning. Unfortunately document is missing pages making it invalid. Spoke with Anil, , to obtain Certificate of Competency.

## 2017-01-09 NOTE — PROGRESS NOTES
INTERVAL HISTORY  Ms. Enedelia Pang states that her hand swelling has improved. She no longer has the sharp shooting pain. The reports a dull ache and persistent stiffness. She still reports joint swelling. She has mild pain in her shoulders still but improved.  She denies any fevers, new rash lesions.      She was given dexamethasone 1/6/2017 4 mg  Prednisone 40 mg po q day 1/6/2017  Prednisone 40 mg 1/7/2017  Prednisone  40 mg 1/8/2017  Prednisone 40 mg 1/9/2017          PHYSICAL EXAM    Filed Vitals:    01/08/17 1600 01/08/17 2000 01/09/17 0400 01/09/17 0800   BP: 125/77 102/89 115/60 119/71   Pulse: 79 89 96 91   Temp: 36.2 °C (97.1 °F) 36.9 °C (98.5 °F) 36.3 °C (97.4 °F) 36.3 °C (97.4 °F)   Resp: 18 16 17 16   Height:       Weight:       SpO2: 94% 95% 98% 97%    Body mass index is 28.28 kg/(m^2).    General/Constitutional: NAD not diaphoretic, comfortable  Eyes: clear conjunctiva, no scleral icterus, EOMI, PERRL  Cardiovascular:2+ peripheral pulses  Respiratory: normal effort, unlabored respiration.  No respiratory distress  Musculoskeletal  Upper Extremities:  Bilateral wrist swelling L>>R.  The right hand swelling is markedly improved with mild tenderness at the MPC, PIP and wrists.  There is more notable synovitis at the MCP and PIP on the left hand with increase swelling in the left wrist.    Elbows have good ROM  Lower Extremities:  No knee effusion bilateral  No MTP tenderness bilateral  No Achilles tenderness.  Skin: Limited skin exam.  Still has previous lesions but no new lesions.    Neuro: CN II-XII grossly intact, Alert, Oriented x 3, moves all four extremities  Psych: normal affect, normal behaviour, follows commands, responses are appropritae      Recent Labs      01/07/17   0420  01/09/17   0358   WBC  13.3*  10.4   RBC  4.02*  4.38   HEMOGLOBIN  8.5*  9.6*   HEMATOCRIT  29.8*  31.6*   MCV  74.1*  72.1*   MCH  21.1*  21.9*   RDW  53.7*  50.0   PLATELETCT  578*  684*   MPV  10.0  9.5    NEUTSPOLYS   --   62.20   LYMPHOCYTES   --   32.90   MONOCYTES   --   4.00   EOSINOPHILS   --   0.20   BASOPHILS   --   0.20       No results found for: QNTTBGOLD  Lab Results   Component Value Date/Time    HEPATITIS B CORS AB,IGM Negative 07/03/2015 12:25 AM    HEPATITIS B SURFACE ANTIGEN Negative 01/06/2017 04:12 AM     Lab Results   Component Value Date/Time    HEPATITIS C ANTIBODY Negative 07/03/2015 12:25 AM     Lab Results   Component Value Date/Time    SODIUM 138 01/07/2017 04:20 AM    POTASSIUM 3.6 01/07/2017 04:20 AM    CHLORIDE 112 01/07/2017 04:20 AM    CO2 17* 01/07/2017 04:20 AM    GLUCOSE 106* 01/07/2017 04:20 AM    BUN 12 01/07/2017 04:20 AM    CREATININE 0.51 01/07/2017 04:20 AM      Lab Results   Component Value Date/Time    WBC 10.4 01/09/2017 03:58 AM    RBC 4.38 01/09/2017 03:58 AM    HEMOGLOBIN 9.6* 01/09/2017 03:58 AM    HEMATOCRIT 31.6* 01/09/2017 03:58 AM    MCV 72.1* 01/09/2017 03:58 AM    MCH 21.9* 01/09/2017 03:58 AM    MCHC 30.4* 01/09/2017 03:58 AM    MPV 9.5 01/09/2017 03:58 AM    NEUTROPHILS-POLYS 62.20 01/09/2017 03:58 AM    LYMPHOCYTES 32.90 01/09/2017 03:58 AM    MONOCYTES 4.00 01/09/2017 03:58 AM    EOSINOPHILS 0.20 01/09/2017 03:58 AM    BASOPHILS 0.20 01/09/2017 03:58 AM    HYPOCHROMIA 1+ 01/05/2017 11:22 PM    ANISOCYTOSIS 1+ 01/05/2017 11:22 PM      Lab Results   Component Value Date/Time    CALCIUM 9.5 01/07/2017 04:20 AM    AST(SGOT) 65* 01/05/2017 11:22 PM    ALT(SGPT) 67* 01/05/2017 11:22 PM    ALKALINE PHOSPHATASE 217* 01/05/2017 11:22 PM    TOTAL BILIRUBIN 0.5 01/05/2017 11:22 PM    ALBUMIN 4.0 01/05/2017 11:22 PM    TOTAL PROTEIN 7.5 01/05/2017 11:22 PM     Lab Results   Component Value Date/Time    RHEUMATOID FACTOR -NEPH- <10 01/06/2017 04:12 AM    ANTINUCLEAR ANTIBODY None Detected 01/06/2017 04:12 AM     Lab Results   Component Value Date/Time    SED RATE WESTERGREN 66* 01/05/2017 11:22 PM    STAT C-REACTIVE PROTEIN 7.92* 01/05/2017 11:22 PM     No results found  for: EMMANUEL DRVVTINTERP  Lab Results   Component Value Date/Time    C3 COMPLEMENT 178.0 01/06/2017 04:12 AM    COMPLEMENT C4 37.0 01/06/2017 04:12 AM     Lab Results   Component Value Date/Time    ANTI-SCL-70 0 01/06/2017 04:12 AM    ANTI-CENTROMERE B AB 2 01/06/2017 04:12 AM     Lab Results   Component Value Date/Time    ANCA IGG <1:20 01/06/2017 04:12 AM    C3 COMPLEMENT 178.0 01/06/2017 04:12 AM     Lab Results   Component Value Date/Time    COLOR Colorless 01/06/2017 12:30 PM    SPECIFIC GRAVITY 1.006 01/06/2017 12:30 PM    PH 7.5 01/06/2017 12:30 PM    GLUCOSE Negative 01/06/2017 12:30 PM    KETONES Negative 01/06/2017 12:30 PM    PROTEIN Negative 01/06/2017 12:30 PM     No results found for: TOTPROTUR, TOTALVOLUME, BTAZUYBC14  No results found for: SSA60, SSA52  No results found for: HBA1C  Lab Results   Component Value Date/Time    CPK TOTAL 46 01/05/2017 11:22 PM     No results found for: G6PD  No results found for: AEKB11SNPB  No results found for: ACESERUM  No results found for: 25HYDROXY  No results found for: TSH, FREEDIR  Lab Results   Component Value Date/Time    TSH 0.740 01/05/2017 11:22 PM    FREE T-4 0.76 01/05/2017 11:22 PM     No results found for: MICROSOMALA, ANTITHYROGL  No results found for: IGGLYMEABS  No results found for: ANTIMITOCHO, FACTIN  No results found for: IGA, TTRANSIGA, ENDOIGA  No results found for: FLTYPE, CRYSTALSBDF  No results found for: ISTATICAL  No results found for: ISTATCREAT  No results found for: CTELOPEP  No results found for: GBMABG  No results found for: PTHINTACT    Assessment and Plan.      Polyarthralgia with acute onset  Hand xrays show periarticular osteopenia which can be seen in inflammatory arthropathies - Rheumatoid arthritis.  RF (-) Would check CCP, obtain feet xrays, and antiDNAse, and follow-up on Parvovirus  MG (-) centromere antibody (-) and SCL 70 antibody (-)  Ferritin is negative so less likely Still's disease also patient has not spiked a  fever during her hospital stay  The challenge in treating Ms. Enedelia Pang is many of medications used to treat inflammatory arthropathies could cause hepatotoxicity and in some cases hepatic fibrosis.  I would like to her to follow up with GI for clarification if she has underlying liver disease.    I would also like to start her on plaquenil however would want to check G6PD.  Please check G6PD, quantiferon, hepatitis C antibody, hepatitis B core antibody, hepatitis B surface antigen, HIV.  Continue prednisone 40 mg for total of 5 days then start to taper to 30 mg x 5 days then 20 mg daily x 5 days then 10 mg po q day x 1 week then 5 mg po q day x 1 week then stop.    I would like to see her January 17, 2017 @ 8:30am for follow-up    Prednisone use  Please start patient on calcium and vitamin D  I reviewed the side effects of prednisone including but not limited to hypertension, glaucoma, diabetes, avascular necrosi and osteoporosis.  I have advised her to take daily calcium and vitamin D.      Abnormal LFT  Please trend LFT  She states she has a previous liver imaging.  If possible please obtain records.  See above    Septal perforation  I spoke with patient today and she is able to follow-up with ENT.  ANCA is negative  If Granulomatosis is still suspected would want tissue to confirm diagnosis.    Anemia  Microcytic with low iron  Direct Honey is negative and LDH is negative.  Haptoglobin is pending    Elevated ESR and CRP  1/1 Blood cultures from 1/6/2017 are negative thus far  With periarticular osteopenia on hand xray, response to prednisone, negative blood culture - likely autoimmune.    Plan discuss with primary team    I did speak with Dr. Jo today.  He reports patient should be able to follow-up with Dr. Soni (ENT).  He reports review of notes indicated that she had a septal perforation of 1-1.5 cm at the time of evaluation but no evidence of crusting or inflammation was observed.  In regards  to LFT this seems to be transient.  He reports historically the LFTs were stable except in Alexandra 10, 2015 when AST was 820 and  that normalized during her hospitalization at [facility] for a diagnosis of colitis.  At that time he reports CT abd/pelvis was done with no mention of abnormal liver.  He also reports in October 2016 LFT were elevated that resolved but etiology unknown.    She does follow with GI.  Dr. Power also reports that her MPO, PR3, c-ANCA and pANCA were negative on his evaluation.      Thank you for the referral.

## 2017-01-09 NOTE — PROGRESS NOTES
Hospital Medicine Progress Note, Adult, Complex               Author: Aayush Sutton Date & Time created: 1/8/2017  8:49 PM     Interval History:  45 y/o female with polyarthralgias and hand swelling    Hand swelling-has improved with steroids, but the L hand is worse than the right. Multiple labs pending at this time. Rheum following. Denies any oral lesions, does not really endorse a gel like quality to her pain. Does not believe repeated motion of joints makes the pain worse.    Anemia-near her baseline. Extended work up further today.    Review of Systems:  Review of Systems   Constitutional: Negative for fever and chills.   HENT: Positive for nosebleeds.    Eyes: Negative for blurred vision.   Respiratory: Negative for cough.    Cardiovascular: Negative for chest pain and leg swelling.   Gastrointestinal: Negative for nausea, vomiting and abdominal pain.   Genitourinary: Negative for dysuria and hematuria.   Musculoskeletal: Positive for back pain and joint pain. Negative for myalgias and neck pain.   Skin: Negative for itching and rash.   Neurological: Negative for dizziness, focal weakness, loss of consciousness and headaches.   Psychiatric/Behavioral: Negative for depression.       Physical Exam:  Physical Exam   Constitutional: She is oriented to person, place, and time. She appears well-developed and well-nourished. No distress.   HENT:   Head: Normocephalic and atraumatic.   Mouth/Throat: No oropharyngeal exudate.   No oral lesions appreciated   Eyes: Pupils are equal, round, and reactive to light. Right eye exhibits no discharge. Left eye exhibits no discharge.   Neck: Normal range of motion. Neck supple.   Cardiovascular: Normal rate, regular rhythm, normal heart sounds and intact distal pulses.    No murmur heard.  Pulmonary/Chest: Effort normal and breath sounds normal. No stridor. No respiratory distress.   Abdominal: Soft. Bowel sounds are normal. There is no tenderness.   Musculoskeletal: Normal  range of motion. She exhibits edema and tenderness.   Swelling of both hands, R>L, DIP and PIP swelling appreciated   Neurological: She is alert and oriented to person, place, and time. No cranial nerve deficit.   Skin: Skin is warm and dry. No rash noted.   Psychiatric: She has a normal mood and affect.   Nursing note and vitals reviewed.      Labs:        Invalid input(s): [id number]  Recent Labs      17   CPKTOTAL  46     Recent Labs      17   SODIUM  139  138   POTASSIUM  3.8  3.6   CHLORIDE  109  112   CO2  18*  17*   BUN  9  12   CREATININE  0.55  0.51   CALCIUM  9.4  9.5     Recent Labs      17   ALTSGPT  67*   --    ASTSGOT  65*   --    ALKPHOSPHAT  217*   --    TBILIRUBIN  0.5   --    LIPASE  14   --    GLUCOSE  108*  106*     Recent Labs      17   1125   RBC  4.40  4.02*   --    HEMOGLOBIN  9.3*  8.5*   --    HEMATOCRIT  32.3*  29.8*   --    PLATELETCT  538*  578*   --    PROTHROMBTM  13.9   --    --    APTT  27.1   --    --    INR  1.04   --    --    FERRITIN   --    --   15.8     Recent Labs      17   WBC  7.7  13.3*   NEUTSPOLYS  78.80*   --    LYMPHOCYTES  15.00*   --    MONOCYTES  5.30   --    EOSINOPHILS  0.00   --    BASOPHILS  0.00   --    ASTSGOT  65*   --    ALTSGPT  67*   --    ALKPHOSPHAT  217*   --    TBILIRUBIN  0.5   --            Hemodynamics:  Temp (24hrs), Av.4 °C (97.5 °F), Min:36.2 °C (97.1 °F), Max:36.8 °C (98.2 °F)  Temperature: 36.2 °C (97.1 °F)  Pulse  Av.2  Min: 65  Max: 114   Blood Pressure: 125/77 mmHg     Respiratory:    Respiration: 18, Pulse Oximetry: 94 %           Fluids:    Intake/Output Summary (Last 24 hours) at 17  Last data filed at 17 1600   Gross per 24 hour   Intake    840 ml   Output      0 ml   Net    840 ml        GI/Nutrition:  Orders Placed This Encounter   Procedures   • Diet Order      Standing Status: Standing      Number of Occurrences: 1      Standing Expiration Date:      Order Specific Question:  Diet:     Answer:  Regular [1]     Medical Decision Making, by Problem:  Active Hospital Problems    Diagnosis   • Swelling of joint of hand [M25.449]-unclear etiology  -certainly seems auto-immune, could be an overlap syndrome  -multiple labs are pending at this time, rheum following  -continue IV dilaudid, IV toradol, PO oxy prN pain and continue prednisone 40 mg daily, which seems to have helped   • Complicated migraine [G43.109]-well controlled, continue outpatient topamax  -cymbalta, capsicum cream, etc.   • Chronic pain syndrome [G89.4]-see above     Anemia-microcytic  -extensive w/u ordered, will F/U in the AM, denies any overt bleeding, no observed jaundice on physical exam      Labs reviewed and Medications reviewed  Lopez catheter: No Lopez      DVT Prophylaxis: Heparin

## 2017-01-10 LAB
ANION GAP SERPL CALC-SCNC: 10 MMOL/L (ref 0–11.9)
B19V IGG SER IA-ACNC: 0.26 IV
B19V IGM SER IA-ACNC: 0.13 IV
BUN SERPL-MCNC: 16 MG/DL (ref 8–22)
CALCIUM SERPL-MCNC: 9.8 MG/DL (ref 8.5–10.5)
CHLORIDE SERPL-SCNC: 110 MMOL/L (ref 96–112)
CO2 SERPL-SCNC: 19 MMOL/L (ref 20–33)
CREAT SERPL-MCNC: 0.72 MG/DL (ref 0.5–1.4)
GFR SERPL CREATININE-BSD FRML MDRD: >60 ML/MIN/1.73 M 2
GLUCOSE SERPL-MCNC: 130 MG/DL (ref 65–99)
HEMOCCULT STL QL: POSITIVE
MAGNESIUM SERPL-MCNC: 2.4 MG/DL (ref 1.5–2.5)
POTASSIUM SERPL-SCNC: 4.1 MMOL/L (ref 3.6–5.5)
SODIUM SERPL-SCNC: 139 MMOL/L (ref 135–145)

## 2017-01-10 PROCEDURE — 82272 OCCULT BLD FECES 1-3 TESTS: CPT

## 2017-01-10 PROCEDURE — 700111 HCHG RX REV CODE 636 W/ 250 OVERRIDE (IP): Performed by: HOSPITALIST

## 2017-01-10 PROCEDURE — 700102 HCHG RX REV CODE 250 W/ 637 OVERRIDE(OP): Performed by: HOSPITALIST

## 2017-01-10 PROCEDURE — 99232 SBSQ HOSP IP/OBS MODERATE 35: CPT | Performed by: INTERNAL MEDICINE

## 2017-01-10 PROCEDURE — 770006 HCHG ROOM/CARE - MED/SURG/GYN SEMI*

## 2017-01-10 PROCEDURE — A9270 NON-COVERED ITEM OR SERVICE: HCPCS | Performed by: HOSPITALIST

## 2017-01-10 PROCEDURE — 700111 HCHG RX REV CODE 636 W/ 250 OVERRIDE (IP): Performed by: INTERNAL MEDICINE

## 2017-01-10 PROCEDURE — A9270 NON-COVERED ITEM OR SERVICE: HCPCS | Performed by: INTERNAL MEDICINE

## 2017-01-10 PROCEDURE — 700102 HCHG RX REV CODE 250 W/ 637 OVERRIDE(OP): Performed by: INTERNAL MEDICINE

## 2017-01-10 PROCEDURE — 83735 ASSAY OF MAGNESIUM: CPT

## 2017-01-10 PROCEDURE — 80048 BASIC METABOLIC PNL TOTAL CA: CPT

## 2017-01-10 PROCEDURE — 36415 COLL VENOUS BLD VENIPUNCTURE: CPT

## 2017-01-10 PROCEDURE — 82955 ASSAY OF G6PD ENZYME: CPT

## 2017-01-10 RX ORDER — OMEPRAZOLE 20 MG/1
20 CAPSULE, DELAYED RELEASE ORAL DAILY
Status: DISCONTINUED | OUTPATIENT
Start: 2017-01-10 | End: 2017-01-12 | Stop reason: HOSPADM

## 2017-01-10 RX ORDER — OXYCODONE HYDROCHLORIDE 5 MG/1
15 TABLET ORAL
Status: DISCONTINUED | OUTPATIENT
Start: 2017-01-10 | End: 2017-01-12 | Stop reason: HOSPADM

## 2017-01-10 RX ORDER — CYCLOBENZAPRINE HCL 10 MG
10 TABLET ORAL 3 TIMES DAILY PRN
Status: DISCONTINUED | OUTPATIENT
Start: 2017-01-10 | End: 2017-01-12 | Stop reason: HOSPADM

## 2017-01-10 RX ADMIN — FERROUS GLUCONATE 324 MG: 324 TABLET ORAL at 08:49

## 2017-01-10 RX ADMIN — HEPARIN SODIUM 5000 UNITS: 5000 INJECTION INTRAVENOUS; SUBCUTANEOUS at 15:15

## 2017-01-10 RX ADMIN — HYDROMORPHONE HYDROCHLORIDE 0.5 MG: 1 INJECTION, SOLUTION INTRAMUSCULAR; INTRAVENOUS; SUBCUTANEOUS at 15:54

## 2017-01-10 RX ADMIN — FERROUS GLUCONATE 324 MG: 324 TABLET ORAL at 18:07

## 2017-01-10 RX ADMIN — CALCIUM 500 MG: 500 TABLET ORAL at 08:49

## 2017-01-10 RX ADMIN — VITAMIN D, TAB 1000IU (100/BT) 1000 UNITS: 25 TAB at 08:49

## 2017-01-10 RX ADMIN — POLYETHYLENE GLYCOL 3350 1 PACKET: 17 POWDER, FOR SOLUTION ORAL at 08:49

## 2017-01-10 RX ADMIN — HYDROMORPHONE HYDROCHLORIDE 0.5 MG: 1 INJECTION, SOLUTION INTRAMUSCULAR; INTRAVENOUS; SUBCUTANEOUS at 00:10

## 2017-01-10 RX ADMIN — DULOXETINE HYDROCHLORIDE 60 MG: 60 CAPSULE, DELAYED RELEASE ORAL at 20:31

## 2017-01-10 RX ADMIN — KETOROLAC TROMETHAMINE 10 MG: 10 TABLET, FILM COATED ORAL at 05:17

## 2017-01-10 RX ADMIN — GABAPENTIN 400 MG: 400 CAPSULE ORAL at 20:31

## 2017-01-10 RX ADMIN — OXYCODONE HYDROCHLORIDE 15 MG: 5 TABLET ORAL at 18:07

## 2017-01-10 RX ADMIN — ACETAZOLAMIDE 500 MG: 500 CAPSULE, EXTENDED RELEASE ORAL at 20:31

## 2017-01-10 RX ADMIN — OMEPRAZOLE 20 MG: 20 CAPSULE, DELAYED RELEASE ORAL at 18:07

## 2017-01-10 RX ADMIN — OXYCODONE HYDROCHLORIDE 15 MG: 5 TABLET ORAL at 08:49

## 2017-01-10 RX ADMIN — ACETAZOLAMIDE 500 MG: 500 CAPSULE, EXTENDED RELEASE ORAL at 08:54

## 2017-01-10 RX ADMIN — GABAPENTIN 400 MG: 400 CAPSULE ORAL at 15:16

## 2017-01-10 RX ADMIN — CAPSICUM OLEORESIN: 0.25 CREAM TOPICAL at 15:00

## 2017-01-10 RX ADMIN — CALCIUM 500 MG: 500 TABLET ORAL at 18:07

## 2017-01-10 RX ADMIN — OXYCODONE HYDROCHLORIDE 15 MG: 5 TABLET ORAL at 14:24

## 2017-01-10 RX ADMIN — HYDROMORPHONE HYDROCHLORIDE 0.5 MG: 1 INJECTION, SOLUTION INTRAMUSCULAR; INTRAVENOUS; SUBCUTANEOUS at 11:08

## 2017-01-10 RX ADMIN — HYDROMORPHONE HYDROCHLORIDE 0.5 MG: 1 INJECTION, SOLUTION INTRAMUSCULAR; INTRAVENOUS; SUBCUTANEOUS at 20:32

## 2017-01-10 RX ADMIN — HYDROMORPHONE HYDROCHLORIDE 0.5 MG: 1 INJECTION, SOLUTION INTRAMUSCULAR; INTRAVENOUS; SUBCUTANEOUS at 05:17

## 2017-01-10 RX ADMIN — DULOXETINE HYDROCHLORIDE 60 MG: 60 CAPSULE, DELAYED RELEASE ORAL at 08:49

## 2017-01-10 RX ADMIN — PREDNISONE 40 MG: 20 TABLET ORAL at 08:49

## 2017-01-10 RX ADMIN — CAPSICUM OLEORESIN: 0.25 CREAM TOPICAL at 09:00

## 2017-01-10 RX ADMIN — GABAPENTIN 400 MG: 400 CAPSULE ORAL at 08:49

## 2017-01-10 RX ADMIN — KETOROLAC TROMETHAMINE 10 MG: 10 TABLET, FILM COATED ORAL at 15:16

## 2017-01-10 RX ADMIN — HEPARIN SODIUM 5000 UNITS: 5000 INJECTION INTRAVENOUS; SUBCUTANEOUS at 05:17

## 2017-01-10 RX ADMIN — CAPSICUM OLEORESIN: 0.25 CREAM TOPICAL at 20:31

## 2017-01-10 ASSESSMENT — ENCOUNTER SYMPTOMS
ABDOMINAL PAIN: 0
BLOOD IN STOOL: 1
HEADACHES: 0
DIARRHEA: 0
DIZZINESS: 0
COUGH: 0
DOUBLE VISION: 0
CONSTIPATION: 0
MYALGIAS: 0
VOMITING: 0
FOCAL WEAKNESS: 0
DEPRESSION: 0
LOSS OF CONSCIOUSNESS: 0
NECK PAIN: 0
BACK PAIN: 1
WEIGHT LOSS: 0
NAUSEA: 0

## 2017-01-10 ASSESSMENT — PAIN SCALES - GENERAL
PAINLEVEL_OUTOF10: 6
PAINLEVEL_OUTOF10: 7
PAINLEVEL_OUTOF10: 8
PAINLEVEL_OUTOF10: 8
PAINLEVEL_OUTOF10: 7
PAINLEVEL_OUTOF10: 7
PAINLEVEL_OUTOF10: 6
PAINLEVEL_OUTOF10: 8

## 2017-01-10 NOTE — PROGRESS NOTES
Hospital Medicine Progress Note, Adult, Complex               Author: Kinga ADITYA Gallagher Date & Time created: 1/10/2017  7:04 AM     ID/CC:47 y/o female with polyarthralgias and hand swelling.    Consultants:  Rheumatology-Dr.Ruth Andrade    Interval History:  Patient c/o pain about the same.On Oxy IR and iv dilaudid, also on Gabapentin.  D/cd toradol given the occult blood positive  Pt previously had followed with GI consultants , had colonoscopy 2 yrs ago and was unremarkable per pt.  Received iv Iron yesterday  G6PD, Quantiferon TB pending   HIV, hepatitis panel-negative    Parvo virus ag-neg  Follow am cbc, if any bleeding might need GI consult.    Review of Systems:  Review of Systems   Constitutional: Negative for weight loss and malaise/fatigue.   HENT: Negative for nosebleeds.    Eyes: Negative for double vision.   Respiratory: Negative for cough.    Cardiovascular: Negative for chest pain and leg swelling.   Gastrointestinal: Positive for blood in stool. Negative for nausea, vomiting, abdominal pain, diarrhea and constipation.   Genitourinary: Negative for dysuria and hematuria.   Musculoskeletal: Positive for back pain and joint pain. Negative for myalgias and neck pain.        Mild improvement     Skin: Negative for itching and rash.   Neurological: Negative for dizziness, focal weakness, loss of consciousness and headaches.   Psychiatric/Behavioral: Negative for depression.       Physical Exam:  Physical Exam   Constitutional: She is oriented to person, place, and time. She appears well-developed and well-nourished. No distress.   HENT:   Head: Normocephalic and atraumatic.   Mouth/Throat: No oropharyngeal exudate.   No oral lesions    Eyes: Pupils are equal, round, and reactive to light. No scleral icterus.   Neck: Normal range of motion. Neck supple.   Cardiovascular: Normal rate, regular rhythm, normal heart sounds and intact distal pulses.    No murmur heard.  Pulmonary/Chest: Effort normal and breath sounds  normal. No stridor. She has no wheezes.   Abdominal: Soft. Bowel sounds are normal. She exhibits no distension. There is no tenderness. There is no guarding.   Musculoskeletal: Normal range of motion. She exhibits edema and tenderness.   Neurological: She is alert and oriented to person, place, and time. No cranial nerve deficit.   Skin: Skin is warm and dry. No rash noted. She is not diaphoretic.   Psychiatric: She has a normal mood and affect. Her behavior is normal.   Nursing note and vitals reviewed.      Labs:        Invalid input(s): [id number]      No results for input(s): SODIUM, POTASSIUM, CHLORIDE, CO2, BUN, CREATININE, MAGNESIUM, PHOSPHORUS, CALCIUM in the last 72 hours.  No results for input(s): ALTSGPT, ASTSGOT, ALKPHOSPHAT, TBILIRUBIN, DBILIRUBIN, GAMMAGT, AMYLASE, LIPASE, ALB, PREALBUMIN, GLUCOSE in the last 72 hours.  Recent Labs      17   1125  17   0358   RBC   --   4.38   HEMOGLOBIN   --   9.6*   HEMATOCRIT   --   31.6*   PLATELETCT   --   684*   IRON   --   10*   FERRITIN  15.8   --    TOTIRONBC   --   449     Recent Labs      17   0358   WBC  10.4   NEUTSPOLYS  62.20   LYMPHOCYTES  32.90   MONOCYTES  4.00   EOSINOPHILS  0.20   BASOPHILS  0.20           Hemodynamics:  Temp (24hrs), Av.6 °C (97.8 °F), Min:36.3 °C (97.4 °F), Max:36.8 °C (98.2 °F)  Temperature: 36.4 °C (97.5 °F)  Pulse  Av.6  Min: 65  Max: 114   Blood Pressure: 116/73 mmHg     Respiratory:    Respiration: 18, Pulse Oximetry: 97 %           Fluids:    Intake/Output Summary (Last 24 hours) at 01/10/17 0704  Last data filed at 01/10/17 0600   Gross per 24 hour   Intake    360 ml   Output      0 ml   Net    360 ml        GI/Nutrition:  Orders Placed This Encounter   Procedures   • Diet Order     Standing Status: Standing      Number of Occurrences: 1      Standing Expiration Date:      Order Specific Question:  Diet:     Answer:  Regular [1]     Medical Decision Making, by Problem:  Active Hospital  Problems    Diagnosis   • Swelling of joint of hand [M25.449]-unclear etiology  -certainly seems auto-immune, could be an overlap syndrome  -On IV dilaudid, , PO oxy prn pain   -G6PD, quantiferon-pending    -Hepatitis panel , HIV-neg  Taper prednisone 40 mg for total of 5 days then start to taper to 30 mg x 5 days then 20 mg daily x 5 days then 10 mg po q day x 1 week then 5 mg po q day x 1 week then stop.    -Resume Ca/Vitamin D supplementation.     • Complicated migraine [G43.109]-well controlled, continue outpatient topamax  -cymbalta, capsicum cream, etc.     • Chronic pain syndrome [G89.4]-see above      *Anemia-microcytic  -Fe deficient  -Fe IV replacement and then PO  -Occult blood positive   -D/cd toradol and heparin SQ given the occult blood positive  Pt previously had followed with GI consultants , had colonoscopy 2 yrs ago and was unremarkable per pt.         Labs reviewed and Medications reviewed  Lopez catheter: No Lopez      DVT Prophylaxis: Contraindicated - High bleeding risk  DVT prophylaxis - mechanical: SCDs  Ulcer prophylaxis: Yes    Assessed for rehab: Patient returned to prior level of function, rehabilitation not indicated at this time

## 2017-01-10 NOTE — CARE PLAN
Problem: Safety  Goal: Will remain free from injury  Up self in care with a steady gait    Problem: Pain Management  Goal: Pain level will decrease to patient’s comfort goal  Pain to her joints and hands; medicate as requested

## 2017-01-10 NOTE — PROGRESS NOTES
Hospital Medicine Progress Note, Adult, Complex               Author: Aayush Sutton Date & Time created: 1/9/2017  5:27 PM     Interval History:  45 y/o female with polyarthralgias and hand swelling    Hand swelling-continued mild improvement with steroids. X-rays of the hand possibly consistent with RA per rheum. Tapering IV dilaudid as patient is taking it nearly Q2 hours PRN.    Anemia-near her baseline. Profoundly Fe deficient. Fe replacement initiated today.    Review of Systems:  Review of Systems   Constitutional: Negative for weight loss and malaise/fatigue.   HENT: Negative for nosebleeds.    Eyes: Negative for double vision.   Respiratory: Negative for cough.    Cardiovascular: Negative for chest pain and leg swelling.   Gastrointestinal: Negative for abdominal pain.   Genitourinary: Negative for dysuria and hematuria.   Musculoskeletal: Positive for back pain and joint pain. Negative for myalgias and neck pain.        Mild improvement     Skin: Negative for itching and rash.        No new lesions or bumps per patient   Neurological: Negative for dizziness, focal weakness, loss of consciousness and headaches.   Psychiatric/Behavioral: Negative for depression.       Physical Exam:  Physical Exam   Constitutional: She is oriented to person, place, and time. She appears well-developed and well-nourished. No distress.   HENT:   Head: Normocephalic and atraumatic.   Mouth/Throat: No oropharyngeal exudate.   No oral lesions appreciated   Eyes: Pupils are equal, round, and reactive to light. No scleral icterus.   Neck: Normal range of motion. Neck supple.   Cardiovascular: Normal rate, regular rhythm, normal heart sounds and intact distal pulses.    No murmur heard.  Pulmonary/Chest: Effort normal and breath sounds normal. No stridor. She has no wheezes.   Abdominal: Soft. Bowel sounds are normal. There is no tenderness.   Musculoskeletal: Normal range of motion. She exhibits edema and tenderness.   Swelling  of both hands, R>L, DIP and PIP swelling appreciated. No changes today   Neurological: She is alert and oriented to person, place, and time. No cranial nerve deficit.   Skin: Skin is warm and dry. No rash noted.   Psychiatric: She has a normal mood and affect.   Nursing note and vitals reviewed.      Labs:        Invalid input(s): [id number]      Recent Labs      17   0420   SODIUM  138   POTASSIUM  3.6   CHLORIDE  112   CO2  17*   BUN  12   CREATININE  0.51   CALCIUM  9.5     Recent Labs      17   0420   GLUCOSE  106*     Recent Labs      17   0420  17   1125  17   0358   RBC  4.02*   --   4.38   HEMOGLOBIN  8.5*   --   9.6*   HEMATOCRIT  29.8*   --   31.6*   PLATELETCT  578*   --   684*   IRON   --    --   10*   FERRITIN   --   15.8   --    TOTIRONBC   --    --   449     Recent Labs      17   0420  17   0358   WBC  13.3*  10.4   NEUTSPOLYS   --   62.20   LYMPHOCYTES   --   32.90   MONOCYTES   --   4.00   EOSINOPHILS   --   0.20   BASOPHILS   --   0.20           Hemodynamics:  Temp (24hrs), Av.6 °C (97.8 °F), Min:36.3 °C (97.4 °F), Max:36.9 °C (98.5 °F)  Temperature: 36.3 °C (97.4 °F)  Pulse  Av  Min: 65  Max: 114   Blood Pressure: 119/71 mmHg     Respiratory:    Respiration: 16, Pulse Oximetry: 97 %           Fluids:  No intake or output data in the 24 hours ending 17 1727     GI/Nutrition:  Orders Placed This Encounter   Procedures   • Diet Order     Standing Status: Standing      Number of Occurrences: 1      Standing Expiration Date:      Order Specific Question:  Diet:     Answer:  Regular [1]     Medical Decision Making, by Problem:  Active Hospital Problems    Diagnosis   • Swelling of joint of hand [M25.449]-unclear etiology, possibly RA  -certainly seems auto-immune, could be an overlap syndrome  -multiple labs are pending at this time, rheum following  -continue IV dilaudid, IV toradol, PO oxy prN pain and continue prednisone 40 mg daily, which  seems to have helped, taper out the IV dilaudid, increase the oxy by 5 mg, talked to patient about this  -will continue further work up today  -will add Ca/Vitamin D supplementation.   • Complicated migraine [G43.109]-well controlled, continue outpatient topamax  -cymbalta, capsicum cream, etc.   • Chronic pain syndrome [G89.4]-see above     Anemia-microcytic, profoundly Fe deficient  -no e/o hemolysis on exam or labs  -Fe IV replacement and then PO  -should get another COLO      Labs reviewed and Medications reviewed  Lopez catheter: No Lopez      DVT Prophylaxis: Heparin

## 2017-01-10 NOTE — PROGRESS NOTES
Followed up with lab and to correctly order P6PD lab per MD order    Alert and able to let her needs known, up self with a steady gait. C/o pain to joints/hands; medicate as requested.cont plan of care, call light within reach and visual checks through the night

## 2017-01-11 LAB
ALBUMIN SERPL BCP-MCNC: 4.5 G/DL (ref 3.2–4.9)
ALBUMIN/GLOB SERPL: 1.2 G/DL
ALP SERPL-CCNC: 122 U/L (ref 30–99)
ALT SERPL-CCNC: 23 U/L (ref 2–50)
ANION GAP SERPL CALC-SCNC: 11 MMOL/L (ref 0–11.9)
ANISOCYTOSIS BLD QL SMEAR: ABNORMAL
AST SERPL-CCNC: 15 U/L (ref 12–45)
BACTERIA BLD CULT: NORMAL
BASOPHILS # BLD AUTO: 2.6 % (ref 0–1.8)
BASOPHILS # BLD: 0.33 K/UL (ref 0–0.12)
BILIRUB SERPL-MCNC: 0.2 MG/DL (ref 0.1–1.5)
BUN SERPL-MCNC: 16 MG/DL (ref 8–22)
CALCIUM SERPL-MCNC: 9.8 MG/DL (ref 8.5–10.5)
CHLORIDE SERPL-SCNC: 106 MMOL/L (ref 96–112)
CO2 SERPL-SCNC: 22 MMOL/L (ref 20–33)
CREAT SERPL-MCNC: 0.74 MG/DL (ref 0.5–1.4)
EOSINOPHIL # BLD AUTO: 0 K/UL (ref 0–0.51)
EOSINOPHIL NFR BLD: 0 % (ref 0–6.9)
ERYTHROCYTE [DISTWIDTH] IN BLOOD BY AUTOMATED COUNT: 52.5 FL (ref 35.9–50)
GFR SERPL CREATININE-BSD FRML MDRD: >60 ML/MIN/1.73 M 2
GIANT PLATELETS BLD QL SMEAR: NORMAL
GLOBULIN SER CALC-MCNC: 3.7 G/DL (ref 1.9–3.5)
GLUCOSE SERPL-MCNC: 100 MG/DL (ref 65–99)
HAPTOGLOB SERPL-MCNC: 272 MG/DL (ref 30–200)
HCT VFR BLD AUTO: 38.4 % (ref 37–47)
HGB BLD-MCNC: 10.9 G/DL (ref 12–16)
HYPOCHROMIA BLD QL SMEAR: ABNORMAL
LYMPHOCYTES # BLD AUTO: 0.88 K/UL (ref 1–4.8)
LYMPHOCYTES NFR BLD: 7 % (ref 22–41)
M TB TUBERC IFN-G BLD QL: NEGATIVE
M TB TUBERC IFN-G/MITOGEN IGNF BLD: -0.01
M TB TUBERC IGNF/MITOGEN IGNF CONTROL: 4.89 [IU]/ML
MANUAL DIFF BLD: NORMAL
MCH RBC QN AUTO: 21.3 PG (ref 27–33)
MCHC RBC AUTO-ENTMCNC: 28.4 G/DL (ref 33.6–35)
MCV RBC AUTO: 75 FL (ref 81.4–97.8)
MICROCYTES BLD QL SMEAR: ABNORMAL
MITOGEN IGNF BCKGRD COR BLD-ACNC: 0.02 [IU]/ML
MONOCYTES # BLD AUTO: 0.23 K/UL (ref 0–0.85)
MONOCYTES NFR BLD AUTO: 1.8 % (ref 0–13.4)
MORPHOLOGY BLD-IMP: NORMAL
NEUTROPHILS # BLD AUTO: 11.08 K/UL (ref 2–7.15)
NEUTROPHILS NFR BLD: 85.1 % (ref 44–72)
NEUTS BAND NFR BLD MANUAL: 3.5 % (ref 0–10)
NRBC # BLD AUTO: 0 K/UL
NRBC BLD AUTO-RTO: 0 /100 WBC
OVALOCYTES BLD QL SMEAR: NORMAL
PLATELET # BLD AUTO: 692 K/UL (ref 164–446)
PLATELET BLD QL SMEAR: NORMAL
PMV BLD AUTO: 9.6 FL (ref 9–12.9)
POIKILOCYTOSIS BLD QL SMEAR: NORMAL
POLYCHROMASIA BLD QL SMEAR: NORMAL
POTASSIUM SERPL-SCNC: 3.6 MMOL/L (ref 3.6–5.5)
PROT SERPL-MCNC: 8.2 G/DL (ref 6–8.2)
RBC # BLD AUTO: 5.12 M/UL (ref 4.2–5.4)
RBC BLD AUTO: PRESENT
SIGNIFICANT IND 70042: NORMAL
SITE SITE: NORMAL
SODIUM SERPL-SCNC: 139 MMOL/L (ref 135–145)
SOURCE SOURCE: NORMAL
WBC # BLD AUTO: 12.5 K/UL (ref 4.8–10.8)

## 2017-01-11 PROCEDURE — 90686 IIV4 VACC NO PRSV 0.5 ML IM: CPT | Performed by: INTERNAL MEDICINE

## 2017-01-11 PROCEDURE — A9270 NON-COVERED ITEM OR SERVICE: HCPCS | Performed by: INTERNAL MEDICINE

## 2017-01-11 PROCEDURE — 3E0234Z INTRODUCTION OF SERUM, TOXOID AND VACCINE INTO MUSCLE, PERCUTANEOUS APPROACH: ICD-10-PCS | Performed by: INTERNAL MEDICINE

## 2017-01-11 PROCEDURE — 85027 COMPLETE CBC AUTOMATED: CPT

## 2017-01-11 PROCEDURE — 770002 HCHG ROOM/CARE - OB PRIVATE (112)

## 2017-01-11 PROCEDURE — 700111 HCHG RX REV CODE 636 W/ 250 OVERRIDE (IP): Performed by: INTERNAL MEDICINE

## 2017-01-11 PROCEDURE — 90471 IMMUNIZATION ADMIN: CPT

## 2017-01-11 PROCEDURE — 700102 HCHG RX REV CODE 250 W/ 637 OVERRIDE(OP): Performed by: INTERNAL MEDICINE

## 2017-01-11 PROCEDURE — 700102 HCHG RX REV CODE 250 W/ 637 OVERRIDE(OP): Performed by: HOSPITALIST

## 2017-01-11 PROCEDURE — A9270 NON-COVERED ITEM OR SERVICE: HCPCS | Performed by: HOSPITALIST

## 2017-01-11 PROCEDURE — 85007 BL SMEAR W/DIFF WBC COUNT: CPT

## 2017-01-11 PROCEDURE — 700111 HCHG RX REV CODE 636 W/ 250 OVERRIDE (IP): Performed by: HOSPITALIST

## 2017-01-11 PROCEDURE — 99232 SBSQ HOSP IP/OBS MODERATE 35: CPT | Performed by: INTERNAL MEDICINE

## 2017-01-11 PROCEDURE — 80053 COMPREHEN METABOLIC PANEL: CPT

## 2017-01-11 RX ORDER — KETOROLAC TROMETHAMINE 30 MG/ML
30 INJECTION, SOLUTION INTRAMUSCULAR; INTRAVENOUS ONCE
Status: COMPLETED | OUTPATIENT
Start: 2017-01-11 | End: 2017-01-11

## 2017-01-11 RX ORDER — DIPHENHYDRAMINE HYDROCHLORIDE 50 MG/ML
50 INJECTION INTRAMUSCULAR; INTRAVENOUS ONCE
Status: COMPLETED | OUTPATIENT
Start: 2017-01-11 | End: 2017-01-11

## 2017-01-11 RX ADMIN — FERROUS GLUCONATE 324 MG: 324 TABLET ORAL at 08:45

## 2017-01-11 RX ADMIN — GABAPENTIN 400 MG: 400 CAPSULE ORAL at 14:48

## 2017-01-11 RX ADMIN — CALCIUM 500 MG: 500 TABLET ORAL at 08:44

## 2017-01-11 RX ADMIN — HYDROMORPHONE HYDROCHLORIDE 0.5 MG: 1 INJECTION, SOLUTION INTRAMUSCULAR; INTRAVENOUS; SUBCUTANEOUS at 12:40

## 2017-01-11 RX ADMIN — HYDROMORPHONE HYDROCHLORIDE 0.5 MG: 1 INJECTION, SOLUTION INTRAMUSCULAR; INTRAVENOUS; SUBCUTANEOUS at 20:51

## 2017-01-11 RX ADMIN — CAPSICUM OLEORESIN: 0.25 CREAM TOPICAL at 20:50

## 2017-01-11 RX ADMIN — CYCLOBENZAPRINE HYDROCHLORIDE 10 MG: 10 TABLET, FILM COATED ORAL at 21:57

## 2017-01-11 RX ADMIN — ACETAZOLAMIDE 500 MG: 500 CAPSULE, EXTENDED RELEASE ORAL at 08:45

## 2017-01-11 RX ADMIN — VITAMIN D, TAB 1000IU (100/BT) 1000 UNITS: 25 TAB at 08:44

## 2017-01-11 RX ADMIN — KETOROLAC TROMETHAMINE 30 MG: 30 INJECTION, SOLUTION INTRAMUSCULAR; INTRAVENOUS at 16:22

## 2017-01-11 RX ADMIN — OXYCODONE HYDROCHLORIDE 15 MG: 5 TABLET ORAL at 14:49

## 2017-01-11 RX ADMIN — DULOXETINE HYDROCHLORIDE 60 MG: 60 CAPSULE, DELAYED RELEASE ORAL at 08:44

## 2017-01-11 RX ADMIN — OXYCODONE HYDROCHLORIDE 15 MG: 5 TABLET ORAL at 06:04

## 2017-01-11 RX ADMIN — OXYCODONE HYDROCHLORIDE 15 MG: 5 TABLET ORAL at 21:04

## 2017-01-11 RX ADMIN — OMEPRAZOLE 20 MG: 20 CAPSULE, DELAYED RELEASE ORAL at 08:46

## 2017-01-11 RX ADMIN — POLYETHYLENE GLYCOL 3350 1 PACKET: 17 POWDER, FOR SOLUTION ORAL at 08:43

## 2017-01-11 RX ADMIN — HYDROMORPHONE HYDROCHLORIDE 0.5 MG: 1 INJECTION, SOLUTION INTRAMUSCULAR; INTRAVENOUS; SUBCUTANEOUS at 08:40

## 2017-01-11 RX ADMIN — DIPHENHYDRAMINE HYDROCHLORIDE 50 MG: 50 INJECTION INTRAMUSCULAR; INTRAVENOUS at 16:22

## 2017-01-11 RX ADMIN — OXYCODONE HYDROCHLORIDE 15 MG: 5 TABLET ORAL at 11:18

## 2017-01-11 RX ADMIN — OXYCODONE HYDROCHLORIDE 15 MG: 5 TABLET ORAL at 17:58

## 2017-01-11 RX ADMIN — HYDROMORPHONE HYDROCHLORIDE 0.5 MG: 1 INJECTION, SOLUTION INTRAMUSCULAR; INTRAVENOUS; SUBCUTANEOUS at 01:06

## 2017-01-11 RX ADMIN — GABAPENTIN 400 MG: 400 CAPSULE ORAL at 20:50

## 2017-01-11 RX ADMIN — CALCIUM 500 MG: 500 TABLET ORAL at 16:23

## 2017-01-11 RX ADMIN — PREDNISONE 40 MG: 20 TABLET ORAL at 08:45

## 2017-01-11 RX ADMIN — CAPSICUM OLEORESIN: 0.25 CREAM TOPICAL at 14:49

## 2017-01-11 RX ADMIN — ACETAZOLAMIDE 500 MG: 500 CAPSULE, EXTENDED RELEASE ORAL at 21:04

## 2017-01-11 RX ADMIN — DULOXETINE HYDROCHLORIDE 60 MG: 60 CAPSULE, DELAYED RELEASE ORAL at 20:50

## 2017-01-11 RX ADMIN — GABAPENTIN 400 MG: 400 CAPSULE ORAL at 08:44

## 2017-01-11 RX ADMIN — FERROUS GLUCONATE 324 MG: 324 TABLET ORAL at 16:23

## 2017-01-11 RX ADMIN — CAPSICUM OLEORESIN: 0.25 CREAM TOPICAL at 08:46

## 2017-01-11 RX ADMIN — INFLUENZA A VIRUS A/CALIFORNIA/7/2009 X-179A (H1N1) ANTIGEN (FORMALDEHYDE INACTIVATED), INFLUENZA A VIRUS A/HONG KONG/4801/2014 X-263B (H3N2) ANTIGEN (FORMALDEHYDE INACTIVATED), INFLUENZA B VIRUS B/PHUKET/3073/2013 ANTIGEN (FORMALDEHYDE INACTIVATED), AND INFLUENZA B VIRUS B/BRISBANE/60/2008 ANTIGEN (FORMALDEHYDE INACTIVATED) 0.5 ML: 15; 15; 15; 15 INJECTION, SUSPENSION INTRAMUSCULAR at 08:57

## 2017-01-11 ASSESSMENT — PAIN SCALES - GENERAL
PAINLEVEL_OUTOF10: 9
PAINLEVEL_OUTOF10: 6
PAINLEVEL_OUTOF10: 7
PAINLEVEL_OUTOF10: 8
PAINLEVEL_OUTOF10: 8
PAINLEVEL_OUTOF10: 5
PAINLEVEL_OUTOF10: 7
PAINLEVEL_OUTOF10: 8
PAINLEVEL_OUTOF10: 6
PAINLEVEL_OUTOF10: 6
PAINLEVEL_OUTOF10: 7
PAINLEVEL_OUTOF10: 9
PAINLEVEL_OUTOF10: 7
PAINLEVEL_OUTOF10: 8

## 2017-01-11 ASSESSMENT — ENCOUNTER SYMPTOMS
CONSTIPATION: 0
LOSS OF CONSCIOUSNESS: 0
DIARRHEA: 0
VOMITING: 0
MYALGIAS: 0
DOUBLE VISION: 0
NECK PAIN: 0
HEADACHES: 0
BACK PAIN: 1
FOCAL WEAKNESS: 0
DEPRESSION: 0
COUGH: 0
NAUSEA: 0
WEIGHT LOSS: 0
BLOOD IN STOOL: 1
ABDOMINAL PAIN: 0
DIZZINESS: 0

## 2017-01-11 NOTE — PROGRESS NOTES
Alert and able to let her needs known. Still with joint and hand pain; medicate as requested.cont plan of care, call light within reach and visual checks through the  night

## 2017-01-11 NOTE — PROGRESS NOTES
Patient is alert and oriented x4. Refusing bed alarm despite education about risk of fall and possible injury. Patient verbalized understanding. Call light within reach, bed in low position, hourly rounding in place. Patient agrees to call for assistance before attempting to get out of bed. Charge RN notified.

## 2017-01-11 NOTE — CARE PLAN
Problem: Venous Thromboembolism (VTW)/Deep Vein Thrombosis (DVT) Prevention:  Goal: Patient will participate in Venous Thrombosis (VTE)/Deep Vein Thrombosis (DVT)Prevention Measures  Outcome: PROGRESSING AS EXPECTED  Ambulates frequently.    Problem: Knowledge Deficit  Goal: Knowledge of disease process/condition, treatment plan, diagnostic tests, and medications will improve  Outcome: PROGRESSING AS EXPECTED  POC discussed including labs and pain management.     Problem: Discharge Barriers/Planning  Goal: Patient’s continuum of care needs will be met  Outcome: PROGRESSING AS EXPECTED  Anticipate discharge home when medically cleared.

## 2017-01-11 NOTE — CARE PLAN
Problem: Safety  Goal: Will remain free from injury  Up self with a steady gait ambulating around the unit    Problem: Bowel/Gastric:  Goal: Will not experience complications related to bowel motility  + blood occult, new meds started

## 2017-01-11 NOTE — PROGRESS NOTES
Patient is Awake, oriented x4. C/o pain primarily in knees and hips this morning, rated 9/10, medicated with dilaudid. No other acute complaints this morning. Reports her chronic migraines are under control. Waiting for lab results. Discussed POC and oriented to communication board.

## 2017-01-11 NOTE — CARE PLAN
Problem: Pain Management  Goal: Pain level will decrease to patient’s comfort goal  Outcome: PROGRESSING AS EXPECTED  Pain controlled with PRN and scheduled pain meds per MAR. Nonpharm pain control methods encouraged.

## 2017-01-11 NOTE — PROGRESS NOTES
Hospital Medicine Progress Note, Adult, Complex               Author: Kinga BURGESS Elliott Date & Time created: 1/11/2017  7:28 AM     ID/CC:47 y/o female with polyarthralgias and hand swelling.    Consultants:  Rheumatology-Dr.Ruth Andrade    Interval History:  Patient feels better today, has her migraine headache and she is requesting for iv toradol and iv benadryl.She stated that it works for her and she has adverse effects to triptans.  Weaning off iv dilaudid  Encouraged to continue to ambulate  Discharge planning tomorrow.    Review of Systems:  Review of Systems   Constitutional: Negative for weight loss and malaise/fatigue.   HENT: Negative for nosebleeds.    Eyes: Negative for double vision.   Respiratory: Negative for cough.    Cardiovascular: Negative for chest pain and leg swelling.   Gastrointestinal: Positive for blood in stool. Negative for nausea, vomiting, abdominal pain, diarrhea and constipation.   Genitourinary: Negative for dysuria and hematuria.   Musculoskeletal: Positive for back pain and joint pain. Negative for myalgias and neck pain.        Mild improvement     Skin: Negative for itching and rash.   Neurological: Negative for dizziness, focal weakness, loss of consciousness and headaches.   Psychiatric/Behavioral: Negative for depression.       Physical Exam:  Physical Exam   Constitutional: She is oriented to person, place, and time. She appears well-developed and well-nourished. No distress.   HENT:   Head: Normocephalic and atraumatic.   Mouth/Throat: No oropharyngeal exudate.   No oral lesions    Eyes: Pupils are equal, round, and reactive to light. No scleral icterus.   Neck: Normal range of motion. Neck supple.   Cardiovascular: Normal rate, regular rhythm, normal heart sounds and intact distal pulses.    No murmur heard.  Pulmonary/Chest: Effort normal and breath sounds normal. No stridor. She has no wheezes.   Abdominal: Soft. Bowel sounds are normal. She exhibits no distension. There is no  tenderness. There is no guarding.   Musculoskeletal: Normal range of motion. She exhibits edema and tenderness.   Neurological: She is alert and oriented to person, place, and time. No cranial nerve deficit.   Skin: Skin is warm and dry. No rash noted. She is not diaphoretic.   Psychiatric: She has a normal mood and affect. Her behavior is normal.   Nursing note and vitals reviewed.      Labs:        Invalid input(s): [id number]      Recent Labs      01/10/17   1354   SODIUM  139   POTASSIUM  4.1   CHLORIDE  110   CO2  19*   BUN  16   CREATININE  0.72   MAGNESIUM  2.4   CALCIUM  9.8     Recent Labs      01/10/17   1354   GLUCOSE  130*     Recent Labs      17   0358   RBC  4.38   HEMOGLOBIN  9.6*   HEMATOCRIT  31.6*   PLATELETCT  684*   IRON  10*   TOTIRONBC  449     Recent Labs      17   0358   WBC  10.4   NEUTSPOLYS  62.20   LYMPHOCYTES  32.90   MONOCYTES  4.00   EOSINOPHILS  0.20   BASOPHILS  0.20           Hemodynamics:  Temp (24hrs), Av.9 °C (98.4 °F), Min:36.3 °C (97.4 °F), Max:37.3 °C (99.2 °F)  Temperature: 37.1 °C (98.8 °F)  Pulse  Av.2  Min: 65  Max: 114   Blood Pressure: 127/70 mmHg     Respiratory:    Respiration: 16, Pulse Oximetry: 97 %           Fluids:    Intake/Output Summary (Last 24 hours) at 17 0728  Last data filed at 01/10/17 2000   Gross per 24 hour   Intake   1180 ml   Output      0 ml   Net   1180 ml        GI/Nutrition:  Orders Placed This Encounter   Procedures   • Diet Order     Standing Status: Standing      Number of Occurrences: 1      Standing Expiration Date:      Order Specific Question:  Diet:     Answer:  Regular [1]     Medical Decision Making, by Problem:  Active Hospital Problems    Diagnosis   • Swelling of joint of hand [M25.449]-unclear etiology  -certainly seems auto-immune, could be an overlap syndrome  -On IV dilaudid, , PO oxy prn pain   -G6PD, quantiferon-pending    -Hepatitis panel , HIV-neg  Taper prednisone 40 mg for total of 5 days then  start to taper to 30 mg x 5 days then 20 mg daily x 5 days then 10 mg po q day x 1 week then 5 mg po q day x 1 week then stop.    -Resume Ca/Vitamin D supplementation.     • Complicated migraine [G43.109]-well controlled, continue outpatient topamax  -cymbalta, capsicum cream, etc.  -One dose of toradol and benadryl per pt request-since it works for her     • Chronic pain syndrome [G89.4]-see above      *Anemia-microcytic  -Fe deficient  -Fe IV replacement and then PO  -Occult blood positive   Pt previously had followed with GI consultants , had colonoscopy 2 yrs ago and was unremarkable per pt.  Hb stable  Likely transient hemorrhoidal bleed  Follow         Labs reviewed and Medications reviewed  Lopez catheter: No Lopez        DVT prophylaxis - mechanical: SCDs and Not indicated at this time, ambulatory  Ulcer prophylaxis: Yes    Assessed for rehab: Patient returned to prior level of function, rehabilitation not indicated at this time

## 2017-01-12 VITALS
DIASTOLIC BLOOD PRESSURE: 57 MMHG | TEMPERATURE: 98 F | HEART RATE: 92 BPM | BODY MASS INDEX: 28.28 KG/M2 | OXYGEN SATURATION: 97 % | HEIGHT: 63 IN | WEIGHT: 159.61 LBS | SYSTOLIC BLOOD PRESSURE: 98 MMHG | RESPIRATION RATE: 18 BRPM

## 2017-01-12 LAB — G6PD RBC-CCNC: 20.4 U/G HB (ref 9.9–16.6)

## 2017-01-12 PROCEDURE — 700102 HCHG RX REV CODE 250 W/ 637 OVERRIDE(OP): Performed by: HOSPITALIST

## 2017-01-12 PROCEDURE — 700111 HCHG RX REV CODE 636 W/ 250 OVERRIDE (IP): Performed by: HOSPITALIST

## 2017-01-12 PROCEDURE — A9270 NON-COVERED ITEM OR SERVICE: HCPCS | Performed by: HOSPITALIST

## 2017-01-12 PROCEDURE — 700102 HCHG RX REV CODE 250 W/ 637 OVERRIDE(OP): Performed by: INTERNAL MEDICINE

## 2017-01-12 PROCEDURE — A9270 NON-COVERED ITEM OR SERVICE: HCPCS | Performed by: INTERNAL MEDICINE

## 2017-01-12 PROCEDURE — 700111 HCHG RX REV CODE 636 W/ 250 OVERRIDE (IP): Performed by: INTERNAL MEDICINE

## 2017-01-12 PROCEDURE — 99239 HOSP IP/OBS DSCHRG MGMT >30: CPT | Performed by: INTERNAL MEDICINE

## 2017-01-12 RX ORDER — DIPHENHYDRAMINE HYDROCHLORIDE 50 MG/ML
50 INJECTION INTRAMUSCULAR; INTRAVENOUS ONCE
Status: COMPLETED | OUTPATIENT
Start: 2017-01-12 | End: 2017-01-12

## 2017-01-12 RX ORDER — CYCLOBENZAPRINE HCL 10 MG
10 TABLET ORAL 3 TIMES DAILY PRN
Qty: 30 TAB | Refills: 0 | Status: SHIPPED | OUTPATIENT
Start: 2017-01-12 | End: 2017-04-29

## 2017-01-12 RX ORDER — FERROUS GLUCONATE 324(38)MG
324 TABLET ORAL 2 TIMES DAILY WITH MEALS
Qty: 60 TAB | Refills: 1 | Status: SHIPPED | OUTPATIENT
Start: 2017-01-12 | End: 2017-10-05

## 2017-01-12 RX ORDER — OMEPRAZOLE 20 MG/1
20 CAPSULE, DELAYED RELEASE ORAL DAILY
Qty: 30 CAP | Refills: 2 | Status: SHIPPED | OUTPATIENT
Start: 2017-01-12 | End: 2019-05-27

## 2017-01-12 RX ORDER — CALCIUM CARBONATE 500(1250)
500 TABLET ORAL 2 TIMES DAILY WITH MEALS
Qty: 90 TAB | Refills: 1 | Status: SHIPPED | OUTPATIENT
Start: 2017-01-12 | End: 2017-10-05

## 2017-01-12 RX ORDER — OXYCODONE HYDROCHLORIDE 5 MG/1
5 TABLET ORAL ONCE
Status: DISCONTINUED | OUTPATIENT
Start: 2017-01-12 | End: 2017-01-12 | Stop reason: HOSPADM

## 2017-01-12 RX ORDER — PREDNISONE 20 MG/1
TABLET ORAL
Qty: 60 TAB | Refills: 1 | Status: SHIPPED | OUTPATIENT
Start: 2017-01-12 | End: 2017-01-17

## 2017-01-12 RX ADMIN — OXYCODONE HYDROCHLORIDE 15 MG: 5 TABLET ORAL at 01:17

## 2017-01-12 RX ADMIN — DIPHENHYDRAMINE HYDROCHLORIDE 50 MG: 50 INJECTION INTRAMUSCULAR; INTRAVENOUS at 09:25

## 2017-01-12 RX ADMIN — CAPSICUM OLEORESIN: 0.25 CREAM TOPICAL at 08:29

## 2017-01-12 RX ADMIN — CALCIUM 500 MG: 500 TABLET ORAL at 08:23

## 2017-01-12 RX ADMIN — FERROUS GLUCONATE 324 MG: 324 TABLET ORAL at 08:23

## 2017-01-12 RX ADMIN — OMEPRAZOLE 20 MG: 20 CAPSULE, DELAYED RELEASE ORAL at 08:23

## 2017-01-12 RX ADMIN — OXYCODONE HYDROCHLORIDE 15 MG: 5 TABLET ORAL at 05:08

## 2017-01-12 RX ADMIN — OXYCODONE HYDROCHLORIDE 15 MG: 5 TABLET ORAL at 08:28

## 2017-01-12 RX ADMIN — ACETAZOLAMIDE 500 MG: 500 CAPSULE, EXTENDED RELEASE ORAL at 08:23

## 2017-01-12 RX ADMIN — GABAPENTIN 400 MG: 400 CAPSULE ORAL at 08:23

## 2017-01-12 RX ADMIN — DULOXETINE HYDROCHLORIDE 60 MG: 60 CAPSULE, DELAYED RELEASE ORAL at 08:23

## 2017-01-12 RX ADMIN — HYDROMORPHONE HYDROCHLORIDE 0.5 MG: 1 INJECTION, SOLUTION INTRAMUSCULAR; INTRAVENOUS; SUBCUTANEOUS at 05:41

## 2017-01-12 RX ADMIN — VITAMIN D, TAB 1000IU (100/BT) 1000 UNITS: 25 TAB at 08:23

## 2017-01-12 RX ADMIN — PREDNISONE 40 MG: 20 TABLET ORAL at 08:23

## 2017-01-12 ASSESSMENT — PAIN SCALES - GENERAL
PAINLEVEL_OUTOF10: 9
PAINLEVEL_OUTOF10: 0
PAINLEVEL_OUTOF10: 8
PAINLEVEL_OUTOF10: 8

## 2017-01-12 NOTE — PROGRESS NOTES
Pt discharged home via personal vehicle. PIV DC'ed. Prescriptions given. Discharge instructions given specifically involving follow up appointments and diagosis, patient verbalized understanding of instructions, 2nd copy of AVS signed and placed on chart.

## 2017-01-12 NOTE — PROGRESS NOTES
Assumed care of patient at 1845 on 1/11, patient A&OX4,  in room, painful in bilateral hands/ankles, medicated with oxycodone/IV dilaudid, patient independent in room, on RA w/o complaints...[place]

## 2017-01-12 NOTE — PROGRESS NOTES
Patient received from transport, report taken via telephone from S632, alert and oriented x4 and able to make needs known, chronic pain and pain management effective,  at bedside, no s/s of distress, call light within reach and will orient to unit

## 2017-01-12 NOTE — DISCHARGE INSTRUCTIONS
Discharge Instructions    Discharged to home by car with relative. Discharged via wheelchair, hospital escort: Yes.  Special equipment needed: Not Applicable    Be sure to schedule a follow-up appointment with your primary care doctor or any specialists as instructed.     Discharge Plan:   Diet Plan: Discussed  Activity Level: Discussed  Confirmed Follow up Appointment: Patient to Call and Schedule Appointment  Confirmed Symptoms Management: Discussed  Medication Reconciliation Updated: Yes  Influenza Vaccine Indication: Indicated: 9 to 64 years of age  Influenza Vaccine Given - only chart on this line when given: Influenza Vaccine Given (See MAR)    I understand that a diet low in cholesterol, fat, and sodium is recommended for good health. Unless I have been given specific instructions below for another diet, I accept this instruction as my diet prescription.   Other diet: regular      · Is patient discharged on Warfarin / Coumadin?   No     · Is patient Post Blood Transfusion?  No    Depression / Suicide Risk    As you are discharged from this [facility] facility, it is important to learn how to keep safe from harming yourself.    Recognize the warning signs:  · Abrupt changes in personality, positive or negative- including increase in energy   · Giving away possessions  · Change in eating patterns- significant weight changes-  positive or negative  · Change in sleeping patterns- unable to sleep or sleeping all the time   · Unwillingness or inability to communicate  · Depression  · Unusual sadness, discouragement and loneliness  · Talk of wanting to die  · Neglect of personal appearance   · Rebelliousness- reckless behavior  · Withdrawal from people/activities they love  · Confusion- inability to concentrate     If you or a loved one observes any of these behaviors or has concerns about self-harm, here's what you can do:  · Talk about it- your feelings and reasons for harming yourself  · Remove any means that  you might use to hurt yourself (examples: pills, rope, extension cords, firearm)  · Get professional help from the community (Mental Health, Substance Abuse, psychological counseling)  · Do not be alone:Call your Safe Contact- someone whom you trust who will be there for you.  · Call your local CRISIS HOTLINE 588-5421 or 557-401-5676  · Call your local Children's Mobile Crisis Response Team [place] (634) 173-1447 or www.Joyme.com  · Call the toll free National Suicide Prevention Hotlines   · National Suicide Prevention Lifeline 504-721-GCVJ (9256)  · National Hope Line Network 800-SUICIDE (306-2321)

## 2017-01-12 NOTE — CARE PLAN
Problem: Venous Thromboembolism (VTW)/Deep Vein Thrombosis (DVT) Prevention:  Goal: Patient will participate in Venous Thrombosis (VTE)/Deep Vein Thrombosis (DVT)Prevention Measures  Intervention: Assess and monitor for anticoagulation complications  Pt refusing SCD's ambulates and understands importance.       Problem: Pain Management  Goal: Pain level will decrease to patient’s comfort goal  Intervention: Follow pain managment plan developed in collaboration with patient and Interdisciplinary Team  Prn medication administered for pain, dark environment provided for migraine.

## 2017-01-12 NOTE — PROGRESS NOTES
Received report from [facility] nurse. Assessment complete. Patient is A&Ox4, states migraine 9/10, prn pain medications administered and dark, quiet environment provided, up self with steady gait, no family present, PIV patent.Patient is resting now. Call light within reach, bed in lowest position, treaded socks in place.

## 2017-01-17 ENCOUNTER — OFFICE VISIT (OUTPATIENT)
Dept: RHEUMATOLOGY | Facility: PHYSICIAN GROUP | Age: 47
End: 2017-01-17
Payer: MEDICAID

## 2017-01-17 VITALS
OXYGEN SATURATION: 99 % | SYSTOLIC BLOOD PRESSURE: 122 MMHG | HEART RATE: 102 BPM | WEIGHT: 160 LBS | RESPIRATION RATE: 12 BRPM | DIASTOLIC BLOOD PRESSURE: 80 MMHG | BODY MASS INDEX: 28.35 KG/M2 | TEMPERATURE: 99.3 F

## 2017-01-17 DIAGNOSIS — M06.00 SERONEGATIVE RHEUMATOID ARTHRITIS (HCC): ICD-10-CM

## 2017-01-17 DIAGNOSIS — M25.449 SWELLING OF JOINT OF HAND, UNSPECIFIED LATERALITY: ICD-10-CM

## 2017-01-17 PROCEDURE — 99214 OFFICE O/P EST MOD 30 MIN: CPT | Performed by: INTERNAL MEDICINE

## 2017-01-17 RX ORDER — SULFASALAZINE 500 MG/1
TABLET, DELAYED RELEASE ORAL
Qty: 60 TAB | Refills: 1 | Status: SHIPPED | OUTPATIENT
Start: 2017-01-17 | End: 2017-02-19 | Stop reason: SDUPTHER

## 2017-01-17 RX ORDER — PREDNISONE 10 MG/1
TABLET ORAL
Qty: 50 TAB | Refills: 0 | Status: SHIPPED | OUTPATIENT
Start: 2017-01-17 | End: 2017-02-14

## 2017-01-17 ASSESSMENT — ENCOUNTER SYMPTOMS
MYALGIAS: 1
BACK PAIN: 1
FEVER: 0

## 2017-01-17 NOTE — Clinical Note
[facility]   [address], [address]  [place] [address]  Phone: [phone]  Fax: [phone]              Encounter Date: 1/17/2017    Dear Dr. Power,    It was a pleasure seeing your patient, Enedelia Pang, on 1/17/2017. Diagnoses of Swelling of joint of hand, unspecified laterality and Seronegative rheumatoid arthritis (CMS-HCC) were pertinent to this visit.     Please find attached progress note which includes the history I obtained from Ms. Pang, my physical examination findings, my impression and recommendations.      Once again, it was a pleasure participating in your patient's care.  Please feel free to contact me if you have any questions or if I can be of any further assistance to your patients.      Sincerely,    Aimee Andrade M.D.  Electronically Signed          PROGRESS NOTE:  Subjective:   Date of Consultation:1/17/2017  8:20 AM  Primary care physician:Elliott Power M.D.      Reason for Consultation:  Ms. Pang  is a pleasant 46 y.o. year old female who presented with follow-up for arthralgia and arthritis    Rheumatoid Arthritis  RF (-) and xray with periarticular osteopenia  Today she is on day 3 of prednisone 30 mg.  She reports resolution of swelling but still has pain in her hands and shoulders    Abnormal LFT  This occurs intermittently but it is unclear the true etiology  She will see GI today      Septal perforation  The patient feels it is enlarging  She will follow-up with ENT    Past Medical History   Diagnosis Date   • Migraine with aura, with intractable migraine, so stated, without mention of status migrainosus    • H/O Clostridium difficile infection    • Hydrocephalus      with lumbar shunt   • Hx MRSA infection    • Port-a-cath in place    • Pituitary abnormality (CMS-HCC)    • Allergy, unspecified not elsewhere classified    • Anxiety    • Depression    • Stroke (CMS-HCC)      2007   • Cold    • Snoring    • Pain      Past Surgical History   "  Procedure Laterality Date   • Cholecystectomy     • Tonsillectomy     • Appendectomy     • Tubal ligation     • Other       right knee surgery   • Other neurological surg       occipital nerve stimulator   • Irrigation & debridement neuro N/A 6/28/2015     Procedure: IRRIGATION & DEBRIDEMENT NEURO;  Surgeon: Oli Oh III, M.D.;  Location: SURGERY [facility];  Service:    • Lumbar exploration N/A 8/31/2015     Procedure: LUMBAR EXPLORATION- Lumbar shunt removal ;  Surgeon: Oli Oh III, M.D.;  Location: SURGERY [facility];  Service:    • Spinal cord stimulator  12/19/2016     Procedure: SPINAL CORD STIMULATOR FOR: PLACEMENT OF LEFT FLANK OCCIPITAL NERVE STIMULATOR BATTERY PACK;  Surgeon: Oli Oh III, M.D.;  Location: SURGERY [facility];  Service:      Allergies   Allergen Reactions   • Prochlorperazine Swelling     Tongue swelling. Reaction as a teen. (compazine)   • Sumatriptan Unspecified     Historical=\"Heart stops.\" Reaction  between 1995 to 1997   • Vancomycin Shortness of Breath and Rash     Reaction in 2005.  D/W patient 8/31/15 - tolerated loading dose of vancomycin administered on 8/30/15 with some itching to chest with decreased infusion rate. Red Man Syndrome     Outpatient Encounter Prescriptions as of 1/17/2017   Medication Sig Dispense Refill   • sulfaSALAzine (AZULFIDINE EN-TAB) 500 MG EC tablet Start 1 tab daily x 7 days then increase to 2 tabs BID 60 Tab 1   • predniSONE (DELTASONE) 10 MG Tab Continue to decrease as instructed  3 days 30 mg then 20 mg daily x 5 days then 10 mg po q day until seen 50 Tab 0   • cyclobenzaprine (FLEXERIL) 10 MG Tab Take 1 Tab by mouth 3 times a day as needed for Muscle Spasms. 30 Tab 0   • omeprazole (PRILOSEC) 20 MG delayed-release capsule Take 1 Cap by mouth every day. 30 Cap 2   • vitamin D (VITAMIND D3) 1000 UNIT Tab Take 1 Tab by mouth every day. 60 Tab    • gabapentin (NEURONTIN) 600 MG tablet Take 600 mg by mouth 3 times a day. "     • ketorolac (TORADOL) 60 MG/2ML Solution 60 mg by Intramuscular route Once PRN (migraine).     • MAGNESIUM OXIDE PO Take 1 Tab by mouth every day.     • cephALEXin (KEFLEX) 500 MG Cap Take 500 mg by mouth 4 times a day. 5 day course, finished 12/24/16     • oxycodone immediate release (ROXICODONE) 10 MG immediate release tablet Take 1 Tab by mouth every four hours as needed for Moderate Pain. 100 Tab 0   • acetaZOLAMIDE SR (DIAMOX) 500 MG CAPSULE SR 12 HR Take 500 mg by mouth 2 times a day.     • Multiple Vitamins-Minerals (MULTIVITAMIN ADULT PO) Take 1 Tab by mouth every day.     • Cyanocobalamin (VITAMIN B 12 PO) Take 1 Tab by mouth every day.     • diphenhydrAMINE (BENADRYL) 50 MG tablet Take 1 Tab by mouth every 6 hours as needed (Headache, give with toradol). 30 Tab 0   • duloxetine (CYMBALTA) 60 MG CPEP delayed-release capsule Take 60 mg by mouth 2 times a day.     • ferrous gluconate (FERGON) 324 (38 FE) MG Tab Take 1 Tab by mouth 2 times a day, with meals. 60 Tab 1   • calcium carbonate (CALCIUM CARBONATE) 500 MG Tab Take 1 Tab by mouth 2 times a day, with meals. 90 Tab 1   • [DISCONTINUED] predniSONE (DELTASONE) 20 MG Tab Prednisone 40 mg 2 more days then start to taper to 30 mg x 5 days then 20 mg daily x 5 days then 10 mg po q day x 1 week then 5 mg po q day x 1 week then stop. 60 Tab 1   • diphenhydrAMINE (BENADRYL) 50 MG/ML Solution INJECT IM EVERY 6 HOURS AS NEEDED FOR ACUTE, SEVERE MIGRAINE HEADACHE AS DIRECTED 25 mL 5     No facility-administered encounter medications on file as of 1/17/2017.     @[facility]@  Social History     Social History   • Marital Status:      Spouse Name: N/A   • Number of Children: N/A   • Years of Education: N/A     Occupational History   • Not on file.     Social History Main Topics   • Smoking status: Never Smoker    • Smokeless tobacco: Never Used   • Alcohol Use: No   • Drug Use: No   • Sexual Activity: Not on file     Other Topics Concern   • Not on file      Social History Narrative      History   Smoking status   • Never Smoker    Smokeless tobacco   • Never Used     History   Alcohol Use No     History   Drug Use No      OB History   No data available      No LMP recorded.    G P A L     No family history on file.    Review of Systems   Constitutional: Negative for fever.   Musculoskeletal: Positive for myalgias, back pain and joint pain.        Objective:   /80 mmHg  Pulse 102  Temp(Src) 37.4 °C (99.3 °F)  Resp 12  Wt 72.576 kg (160 lb)  SpO2 99%    Physical Exam   Constitutional: She is oriented to person, place, and time. She appears well-developed and well-nourished. No distress.   HENT:   Head: Normocephalic and atraumatic.   Right Ear: External ear normal.   Left Ear: External ear normal.   Eyes: Conjunctivae are normal. Right eye exhibits no discharge. Left eye exhibits no discharge.   Cardiovascular: Normal rate, regular rhythm and normal heart sounds.    No murmur heard.  Pulmonary/Chest: Effort normal. No stridor. No respiratory distress. She has no wheezes.   Abdominal: Soft. She exhibits no distension. There is no tenderness.   No hepatomegaly   Musculoskeletal: She exhibits no edema.   No synovitis of the MCP, PIP or DIP  Bilateral MTP tenderness  Achilles tenderness R>>L   Neurological: She is alert and oriented to person, place, and time.   Skin: Skin is warm and dry. No rash noted. She is not diaphoretic. No erythema.   Psychiatric: She has a normal mood and affect. Her behavior is normal. Thought content normal.       Assessment:     1. Swelling of joint of hand, unspecified laterality  sulfaSALAzine (AZULFIDINE EN-TAB) 500 MG EC tablet    predniSONE (DELTASONE) 10 MG Tab    CCP    DX-JOINT SURVEY-FEET SINGLE VIEW    WESTERGREN SED RATE    CRP HIGH SENSITIVE    CBC WITH DIFFERENTIAL   2. Seronegative rheumatoid arthritis (CMS-[facility])       Labs:      Lab Results   Component Value Date/Time    QUANTIFERON TB GOLD Negative 01/09/2017 06:01  PM     Lab Results   Component Value Date/Time    HEPATITIS B CORS AB,IGM Negative 01/09/2017 06:00 PM    HEPATITIS B SURFACE ANTIGEN Negative 01/09/2017 06:00 PM     Lab Results   Component Value Date/Time    HEPATITIS C ANTIBODY Negative 01/09/2017 06:00 PM     Lab Results   Component Value Date/Time    SODIUM 139 01/11/2017 02:29 PM    POTASSIUM 3.6 01/11/2017 02:29 PM    CHLORIDE 106 01/11/2017 02:29 PM    CO2 22 01/11/2017 02:29 PM    GLUCOSE 100* 01/11/2017 02:29 PM    BUN 16 01/11/2017 02:29 PM    CREATININE 0.74 01/11/2017 02:29 PM      Lab Results   Component Value Date/Time    WBC 12.5* 01/11/2017 02:29 PM    RBC 5.12 01/11/2017 02:29 PM    HEMOGLOBIN 10.9* 01/11/2017 02:29 PM    HEMATOCRIT 38.4 01/11/2017 02:29 PM    MCV 75.0* 01/11/2017 02:29 PM    MCH 21.3* 01/11/2017 02:29 PM    MCHC 28.4* 01/11/2017 02:29 PM    MPV 9.6 01/11/2017 02:29 PM    NEUTROPHILS-POLYS 85.10* 01/11/2017 02:29 PM    LYMPHOCYTES 7.00* 01/11/2017 02:29 PM    MONOCYTES 1.80 01/11/2017 02:29 PM    EOSINOPHILS 0.00 01/11/2017 02:29 PM    BASOPHILS 2.60* 01/11/2017 02:29 PM    HYPOCHROMIA 1+ 01/11/2017 02:29 PM    ANISOCYTOSIS 1+ 01/11/2017 02:29 PM      Lab Results   Component Value Date/Time    CALCIUM 9.8 01/11/2017 02:29 PM    AST(SGOT) 15 01/11/2017 02:29 PM    ALT(SGPT) 23 01/11/2017 02:29 PM    ALKALINE PHOSPHATASE 122* 01/11/2017 02:29 PM    TOTAL BILIRUBIN 0.2 01/11/2017 02:29 PM    ALBUMIN 4.5 01/11/2017 02:29 PM    TOTAL PROTEIN 8.2 01/11/2017 02:29 PM     Lab Results   Component Value Date/Time    RHEUMATOID FACTOR -NEPH- <10 01/06/2017 04:12 AM    ANTINUCLEAR ANTIBODY None Detected 01/06/2017 04:12 AM     Lab Results   Component Value Date/Time    SED RATE WESTERGREN 66* 01/05/2017 11:22 PM    STAT C-REACTIVE PROTEIN 7.92* 01/05/2017 11:22 PM     No results found for: LETA BENITEZ  Lab Results   Component Value Date/Time    C3 COMPLEMENT 178.0 01/06/2017 04:12 AM    COMPLEMENT C4 37.0 01/06/2017 04:12 AM      Lab Results   Component Value Date/Time    ANTI-SCL-70 0 01/06/2017 04:12 AM    ANTI-CENTROMERE B AB 2 01/06/2017 04:12 AM     Lab Results   Component Value Date/Time    ANCA IGG <1:20 01/06/2017 04:12 AM    C3 COMPLEMENT 178.0 01/06/2017 04:12 AM     Lab Results   Component Value Date/Time    COLOR Colorless 01/06/2017 12:30 PM    SPECIFIC GRAVITY 1.006 01/06/2017 12:30 PM    PH 7.5 01/06/2017 12:30 PM    GLUCOSE Negative 01/06/2017 12:30 PM    KETONES Negative 01/06/2017 12:30 PM    PROTEIN Negative 01/06/2017 12:30 PM     No results found for: TOTPROTUR, TOTALVOLUME, [id number]  No results found for: SSA60, SSA52  No results found for: HBA1C  Lab Results   Component Value Date/Time    CPK TOTAL 40 01/06/2017 04:12 AM     Lab Results   Component Value Date/Time    G-6-PD 20.4* 01/10/2017 01:54 PM     No results found for: [id number]  No results found for: ACESERUM  Lab Results   Component Value Date/Time    25-HYDROXY   VITAMIN D 25 45 01/09/2017 06:00 PM     No results found for: TSH, FREEDIR  Lab Results   Component Value Date/Time    TSH 0.740 01/05/2017 11:22 PM    FREE T-4 0.76 01/05/2017 11:22 PM     No results found for: MICROSOMALA, ANTITHYROGL  No results found for: IGGLYMEABS  No results found for: ANTIMITOCHO, FACTIN  No results found for: IGA, TTRANSIGA, ENDOIGA  No results found for: FLTYPE, CRYSTALSBDF  No results found for: ISTATICAL  No results found for: ISTATCREAT  No results found for: CTELOPEP  No results found for: GBMABG  No results found for: PTHINTACT      Medical Decision Making:  Today's Assessment / Status / Plan:     RHeumatoid Arthritis with xrays showing periarticular osteopenia and involvement of small joints of hands  Will check CCP  Will continue to taper down prednisone  I reviewed the side effects of prednisone including but not limited to hypertension, glaucoma, diabetes and osteoporosis.  I have advised him to take daily calcium and vitamin D.    I reviewed the side effects  of sulfasalazine.  We discussed GI symptoms as well as changes in her CBC.  I stressed the importance of routine blood monitoring.  G6PD is normal  We will start with one tablet daily and increase to 2 tablets daily  We will also obtain feet xray    History of abnormal LFT  Will see GI today      History of retinal disease  Patient was advise to see ophthalmologist prior to starting plaquneil.    Bone health  Will have patient take calcium and vitamin D        1. Swelling of joint of hand, unspecified laterality  sulfaSALAzine (AZULFIDINE EN-TAB) 500 MG EC tablet    predniSONE (DELTASONE) 10 MG Tab    CCP    DX-JOINT SURVEY-FEET SINGLE VIEW    WESTERGREN SED RATE    CRP HIGH SENSITIVE    CBC WITH DIFFERENTIAL   2. Seronegative rheumatoid arthritis (CMS-HCC)       Return in about 4 weeks (around 2/14/2017).      I have spent greater than 50% of this 30  minute visit in counseling/coordination of care with the patient regarding disease, side effects of medications and next steps.    She agreed and verbalized her agreement and understanding with the current plan. I answered all questions and concerns she has at this time and advised her to call at any time in there interim with questions or concerns in regards to her care.    Thank you for allowing me to participate in her care, I will continue to follow closely.

## 2017-01-17 NOTE — MR AVS SNAPSHOT
"Enedelia Pang   2017 8:30 AM   Office Visit   MRN: [id number]    Department:  Rheumatology [facility]   Dept Phone:  [phone]    Description:  Female : 1970   Provider:  Aimee Andrade M.D.           Reason for Visit     Follow-Up follow up      Allergies as of 2017     Allergen Noted Reactions    Prochlorperazine 2015   Swelling    Tongue swelling. Reaction as a teen. (compazine)    Sumatriptan 2015   Unspecified    Historical=\"Heart stops.\" Reaction  between  to     Vancomycin 2015   Shortness of Breath, Rash    Reaction in .  D/W patient 8/31/15 - tolerated loading dose of vancomycin administered on 8/30/15 with some itching to chest with decreased infusion rate. Red Man Syndrome      You were diagnosed with     Swelling of joint of hand, unspecified laterality   [[id number]]         Vital Signs     Blood Pressure Pulse Temperature Respirations Weight Oxygen Saturation    122/80 mmHg 102 37.4 °C (99.3 °F) 12 72.576 kg (160 lb) 99%    Smoking Status                   Never Smoker            Basic Information     Date Of Birth Sex Race Ethnicity Preferred Language    1970 Female White Non- English      Your appointments     2017  8:30 AM   Follow Up Visit with Aimee Andrade M.D.   [facility] (--)    [address] [place] [address] [id number] [phone]           You will be receiving a confirmation call a few days before your appointment from our automated call confirmation system.              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.9 Medium  10/30/2014 - Present    Elevated ALT R74.0   " 11/4/2014 - Present    Intractable migraine G43.919 Medium  3/21/2015 - Present    Low blood pressure reading R03.1   3/22/2015 - Present    Dermatitis- chronic L30.9   3/24/2015 - Present    Wounds, multiple T07   4/23/2015 - Present    Hydrocephalus G91.9   4/23/2015 - Present    Back pain M54.9 High  6/28/2015 - Present    SIRS (systemic inflammatory response syndrome) (CMS-HCC) R65.10   6/28/2015 - Present    S/P  shunt Z98.2   6/29/2015 - Present    Anxiety F41.9   6/29/2015 - Present    Depression F32.9   6/29/2015 - Present    Headache R51   7/3/2015 - Present    Abdominal pain R10.9   7/3/2015 - Present    Hypokalemia E87.6   8/30/2015 - Present    Lactic acidosis E87.2   8/30/2015 - Present    Hypomagnesemia E83.42   8/30/2015 - Present    Leukocytosis D72.829   8/30/2015 - Present    Sepsis (CMS-HCC) A41.9   8/30/2015 - Present    Intractable low back pain M54.5   8/30/2015 - Present    Hypocalcemia E83.51   8/30/2015 - Present    Septic shock (CMS-HCC) A41.9, R65.21 High  8/31/2015 - Present    Normocytic anemia D64.9   8/31/2015 - Present    Thrombocytopenia (CMS-HCC) D69.6   8/31/2015 - Present    Infected venous access port T80.219A   10/3/2015 - Present    Migraine G43.909   10/4/2015 - Present    Chronic pain G89.29 High  10/4/2015 - Present    Bacteremia R78.81   10/4/2015 - Present    Chronic pain syndrome G89.4 Low  12/15/2016 - Present    Chronic pain disorder G89.4   12/19/2016 - Present    Swelling of joint of hand M25.449 High  1/6/2017 - Present      Health Maintenance        Date Due Completion Dates    IMM DTaP/Tdap/Td Vaccine (1 - Tdap) 8/16/1991 ---    PAP SMEAR 8/16/1993 ---    MAMMOGRAM 8/16/2012 ---            Current Immunizations     Influenza Vaccine Quad Inj (Pf) 1/11/2017  8:57 AM, 10/9/2014  9:45 AM    Influenza Vaccine Quad Inj (Preserved) 10/3/2015  4:06 PM      Below and/or attached are the medications your provider expects you to take. Review all of your home medications  and newly ordered medications with your provider and/or pharmacist. Follow medication instructions as directed by your provider and/or pharmacist. Please keep your medication list with you and share with your provider. Update the information when medications are discontinued, doses are changed, or new medications (including over-the-counter products) are added; and carry medication information at all times in the event of emergency situations     Allergies:  PROCHLORPERAZINE - Swelling     SUMATRIPTAN - Unspecified     VANCOMYCIN - Shortness of Breath,Rash               Medications  Valid as of: January 17, 2017 -  9:20 AM    Generic Name Brand Name Tablet Size Instructions for use    AcetaZOLAMIDE (CAPSULE SR 12 HR) DIAMOX 500 MG Take 500 mg by mouth 2 times a day.        Cephalexin (Cap) KEFLEX 500 MG Take 500 mg by mouth 4 times a day. 5 day course, finished 12/24/16        Cholecalciferol (Tab) VITAMIND D3 1000 UNIT Take 1 Tab by mouth every day.        Cyanocobalamin   Take 1 Tab by mouth every day.        Cyclobenzaprine HCl (Tab) FLEXERIL 10 MG Take 1 Tab by mouth 3 times a day as needed for Muscle Spasms.        DiphenhydrAMINE HCl (Tab) BENADRYL 50 MG Take 1 Tab by mouth every 6 hours as needed (Headache, give with toradol).        DiphenhydrAMINE HCl (Solution) BENADRYL 50 MG/ML INJECT IM EVERY 6 HOURS AS NEEDED FOR ACUTE, SEVERE MIGRAINE HEADACHE AS DIRECTED        DULoxetine HCl (Cap DR Particles) CYMBALTA 60 MG Take 60 mg by mouth 2 times a day.        Ferrous Gluconate (Tab) FERGON 324 (38 FE) MG Take 1 Tab by mouth 2 times a day, with meals.        Gabapentin (Tab) NEURONTIN 600 MG Take 600 mg by mouth 3 times a day.        Ketorolac Tromethamine (Solution) TORADOL 60 MG/2ML 60 mg by Intramuscular route Once PRN (migraine).        Magnesium Oxide   Take 1 Tab by mouth every day.        Multiple Vitamins-Minerals   Take 1 Tab by mouth every day.        Omeprazole (CAPSULE DELAYED RELEASE) PRILOSEC 20  MG Take 1 Cap by mouth every day.        OxyCODONE HCl (Tab) ROXICODONE 10 MG Take 1 Tab by mouth every four hours as needed for Moderate Pain.        Oyster Shell (Tab) OS-ZAIN 500 500 MG Take 1 Tab by mouth 2 times a day, with meals.        PredniSONE (Tab) DELTASONE 10 MG Continue to decrease as instructed  3 days 30 mg then 20 mg daily x 5 days then 10 mg po q day until seen        SulfaSALAzine (Tablet Delayed Response) AZULFIDINE EN-tab 500 MG Start 1 tab daily x 7 days then increase to 2 tabs BID        .                 Medicines prescribed today were sent to:     [facility] - [place], [place] - [address]    [address] [place] [address]    Phone: [phone] Fax: [phone]    Open 24 Hours?: No      Medication refill instructions:       If your prescription bottle indicates you have medication refills left, it is not necessary to call your provider’s office. Please contact your pharmacy and they will refill your medication.    If your prescription bottle indicates you do not have any refills left, you may request refills at any time through one of the following ways: The online [employer] system (except Urgent Care), by calling your provider’s office, or by asking your pharmacy to contact your provider’s office with a refill request. Medication refills are processed only during regular business hours and may not be available until the next business day. Your provider may request additional information or to have a follow-up visit with you prior to refilling your medication.   *Please Note: Medication refills are assigned a new Rx number when refilled electronically. Your pharmacy may indicate that no refills were authorized even though a new prescription for the same medication is available at the pharmacy. Please request the medicine by name with the pharmacy before contacting your provider for a refill.        Your To Do List     Future Labs/Procedures Complete By Expires    CBC WITH DIFFERENTIAL  As  directed 1/17/2018    CCP  As directed 1/17/2018    DX-JOINT SURVEY-FEET SINGLE VIEW  As directed 1/17/2018    WESTERGREN SED RATE  As directed 1/17/2018         MyChart Access Code: Activation code not generated  Current [employer] Status: Active

## 2017-01-17 NOTE — PROGRESS NOTES
Subjective:   Date of Consultation:1/17/2017  8:20 AM  Primary care physician:Elliott Power M.D.      Reason for Consultation:  Ms. Pang  is a pleasant 46 y.o. year old female who presented with follow-up for arthralgia and arthritis    Rheumatoid Arthritis  RF (-) and xray with periarticular osteopenia  Today she is on day 3 of prednisone 30 mg.  She reports resolution of swelling but still has pain in her hands and shoulders    Abnormal LFT  This occurs intermittently but it is unclear the true etiology  She will see GI today      Septal perforation  The patient feels it is enlarging  She will follow-up with ENT    Past Medical History   Diagnosis Date   • Migraine with aura, with intractable migraine, so stated, without mention of status migrainosus    • H/O Clostridium difficile infection    • Hydrocephalus      with lumbar shunt   • Hx MRSA infection    • Port-a-cath in place    • Pituitary abnormality (CMS-HCC)    • Allergy, unspecified not elsewhere classified    • Anxiety    • Depression    • Stroke (CMS-HCC)      2007   • Cold    • Snoring    • Pain      Past Surgical History   Procedure Laterality Date   • Cholecystectomy     • Tonsillectomy     • Appendectomy     • Tubal ligation     • Other       right knee surgery   • Other neurological surg       occipital nerve stimulator   • Irrigation & debridement neuro N/A 6/28/2015     Procedure: IRRIGATION & DEBRIDEMENT NEURO;  Surgeon: Oli Oh III, M.D.;  Location: SURGERY [facility];  Service:    • Lumbar exploration N/A 8/31/2015     Procedure: LUMBAR EXPLORATION- Lumbar shunt removal ;  Surgeon: Oli Oh III, M.D.;  Location: SURGERY [facility];  Service:    • Spinal cord stimulator  12/19/2016     Procedure: SPINAL CORD STIMULATOR FOR: PLACEMENT OF LEFT FLANK OCCIPITAL NERVE STIMULATOR BATTERY PACK;  Surgeon: Oli Oh III, M.D.;  Location: SURGERY [facility];  Service:      Allergies   Allergen Reactions   •  "Prochlorperazine Swelling     Tongue swelling. Reaction as a teen. (compazine)   • Sumatriptan Unspecified     Historical=\"Heart stops.\" Reaction  between 1995 to 1997   • Vancomycin Shortness of Breath and Rash     Reaction in 2005.  D/W patient 8/31/15 - tolerated loading dose of vancomycin administered on 8/30/15 with some itching to chest with decreased infusion rate. Red Man Syndrome     Outpatient Encounter Prescriptions as of 1/17/2017   Medication Sig Dispense Refill   • sulfaSALAzine (AZULFIDINE EN-TAB) 500 MG EC tablet Start 1 tab daily x 7 days then increase to 2 tabs BID 60 Tab 1   • predniSONE (DELTASONE) 10 MG Tab Continue to decrease as instructed  3 days 30 mg then 20 mg daily x 5 days then 10 mg po q day until seen 50 Tab 0   • cyclobenzaprine (FLEXERIL) 10 MG Tab Take 1 Tab by mouth 3 times a day as needed for Muscle Spasms. 30 Tab 0   • omeprazole (PRILOSEC) 20 MG delayed-release capsule Take 1 Cap by mouth every day. 30 Cap 2   • vitamin D (VITAMIND D3) 1000 UNIT Tab Take 1 Tab by mouth every day. 60 Tab    • gabapentin (NEURONTIN) 600 MG tablet Take 600 mg by mouth 3 times a day.     • ketorolac (TORADOL) 60 MG/2ML Solution 60 mg by Intramuscular route Once PRN (migraine).     • MAGNESIUM OXIDE PO Take 1 Tab by mouth every day.     • cephALEXin (KEFLEX) 500 MG Cap Take 500 mg by mouth 4 times a day. 5 day course, finished 12/24/16     • oxycodone immediate release (ROXICODONE) 10 MG immediate release tablet Take 1 Tab by mouth every four hours as needed for Moderate Pain. 100 Tab 0   • acetaZOLAMIDE SR (DIAMOX) 500 MG CAPSULE SR 12 HR Take 500 mg by mouth 2 times a day.     • Multiple Vitamins-Minerals (MULTIVITAMIN ADULT PO) Take 1 Tab by mouth every day.     • Cyanocobalamin (VITAMIN B 12 PO) Take 1 Tab by mouth every day.     • diphenhydrAMINE (BENADRYL) 50 MG tablet Take 1 Tab by mouth every 6 hours as needed (Headache, give with toradol). 30 Tab 0   • duloxetine (CYMBALTA) 60 MG CPEP " delayed-release capsule Take 60 mg by mouth 2 times a day.     • ferrous gluconate (FERGON) 324 (38 FE) MG Tab Take 1 Tab by mouth 2 times a day, with meals. 60 Tab 1   • calcium carbonate (CALCIUM CARBONATE) 500 MG Tab Take 1 Tab by mouth 2 times a day, with meals. 90 Tab 1   • [DISCONTINUED] predniSONE (DELTASONE) 20 MG Tab Prednisone 40 mg 2 more days then start to taper to 30 mg x 5 days then 20 mg daily x 5 days then 10 mg po q day x 1 week then 5 mg po q day x 1 week then stop. 60 Tab 1   • diphenhydrAMINE (BENADRYL) 50 MG/ML Solution INJECT IM EVERY 6 HOURS AS NEEDED FOR ACUTE, SEVERE MIGRAINE HEADACHE AS DIRECTED 25 mL 5     No facility-administered encounter medications on file as of 1/17/2017.     @[facility]@  Social History     Social History   • Marital Status:      Spouse Name: N/A   • Number of Children: N/A   • Years of Education: N/A     Occupational History   • Not on file.     Social History Main Topics   • Smoking status: Never Smoker    • Smokeless tobacco: Never Used   • Alcohol Use: No   • Drug Use: No   • Sexual Activity: Not on file     Other Topics Concern   • Not on file     Social History Narrative      History   Smoking status   • Never Smoker    Smokeless tobacco   • Never Used     History   Alcohol Use No     History   Drug Use No      OB History   No data available      No LMP recorded.    G P A [facility]     No family history on file.    Review of Systems   Constitutional: Negative for fever.   Musculoskeletal: Positive for myalgias, back pain and joint pain.        Objective:   /80 mmHg  Pulse 102  Temp(Src) 37.4 °C (99.3 °F)  Resp 12  Wt 72.576 kg (160 lb)  SpO2 99%    Physical Exam   Constitutional: She is oriented to person, place, and time. She appears well-developed and well-nourished. No distress.   HENT:   Head: Normocephalic and atraumatic.   Right Ear: External ear normal.   Left Ear: External ear normal.   Eyes: Conjunctivae are normal. Right eye exhibits no discharge.  Left eye exhibits no discharge.   Cardiovascular: Normal rate, regular rhythm and normal heart sounds.    No murmur heard.  Pulmonary/Chest: Effort normal. No stridor. No respiratory distress. She has no wheezes.   Abdominal: Soft. She exhibits no distension. There is no tenderness.   No hepatomegaly   Musculoskeletal: She exhibits no edema.   No synovitis of the MCP, PIP or DIP  Bilateral MTP tenderness  Achilles tenderness R>>L   Neurological: She is alert and oriented to person, place, and time.   Skin: Skin is warm and dry. No rash noted. She is not diaphoretic. No erythema.   Psychiatric: She has a normal mood and affect. Her behavior is normal. Thought content normal.       Assessment:     1. Swelling of joint of hand, unspecified laterality  sulfaSALAzine (AZULFIDINE EN-TAB) 500 MG EC tablet    predniSONE (DELTASONE) 10 MG Tab    CCP    DX-JOINT SURVEY-FEET SINGLE VIEW    WESTERGREN SED RATE    CRP HIGH SENSITIVE    CBC WITH DIFFERENTIAL   2. Seronegative rheumatoid arthritis (CMS-HCC)       Labs:      Lab Results   Component Value Date/Time    QUANTIFERON TB GOLD Negative 01/09/2017 06:01 PM     Lab Results   Component Value Date/Time    HEPATITIS B CORS AB,IGM Negative 01/09/2017 06:00 PM    HEPATITIS B SURFACE ANTIGEN Negative 01/09/2017 06:00 PM     Lab Results   Component Value Date/Time    HEPATITIS C ANTIBODY Negative 01/09/2017 06:00 PM     Lab Results   Component Value Date/Time    SODIUM 139 01/11/2017 02:29 PM    POTASSIUM 3.6 01/11/2017 02:29 PM    CHLORIDE 106 01/11/2017 02:29 PM    CO2 22 01/11/2017 02:29 PM    GLUCOSE 100* 01/11/2017 02:29 PM    BUN 16 01/11/2017 02:29 PM    CREATININE 0.74 01/11/2017 02:29 PM      Lab Results   Component Value Date/Time    WBC 12.5* 01/11/2017 02:29 PM    RBC 5.12 01/11/2017 02:29 PM    HEMOGLOBIN 10.9* 01/11/2017 02:29 PM    HEMATOCRIT 38.4 01/11/2017 02:29 PM    MCV 75.0* 01/11/2017 02:29 PM    MCH 21.3* 01/11/2017 02:29 PM    MCHC 28.4* 01/11/2017 02:29 PM     MPV 9.6 01/11/2017 02:29 PM    NEUTROPHILS-POLYS 85.10* 01/11/2017 02:29 PM    LYMPHOCYTES 7.00* 01/11/2017 02:29 PM    MONOCYTES 1.80 01/11/2017 02:29 PM    EOSINOPHILS 0.00 01/11/2017 02:29 PM    BASOPHILS 2.60* 01/11/2017 02:29 PM    HYPOCHROMIA 1+ 01/11/2017 02:29 PM    ANISOCYTOSIS 1+ 01/11/2017 02:29 PM      Lab Results   Component Value Date/Time    CALCIUM 9.8 01/11/2017 02:29 PM    AST(SGOT) 15 01/11/2017 02:29 PM    ALT(SGPT) 23 01/11/2017 02:29 PM    ALKALINE PHOSPHATASE 122* 01/11/2017 02:29 PM    TOTAL BILIRUBIN 0.2 01/11/2017 02:29 PM    ALBUMIN 4.5 01/11/2017 02:29 PM    TOTAL PROTEIN 8.2 01/11/2017 02:29 PM     Lab Results   Component Value Date/Time    RHEUMATOID FACTOR -NEPH- <10 01/06/2017 04:12 AM    ANTINUCLEAR ANTIBODY None Detected 01/06/2017 04:12 AM     Lab Results   Component Value Date/Time    SED RATE WESTERGREN 66* 01/05/2017 11:22 PM    STAT C-REACTIVE PROTEIN 7.92* 01/05/2017 11:22 PM     No results found for: LETA BENITEZ  Lab Results   Component Value Date/Time    C3 COMPLEMENT 178.0 01/06/2017 04:12 AM    COMPLEMENT C4 37.0 01/06/2017 04:12 AM     Lab Results   Component Value Date/Time    ANTI-SCL-70 0 01/06/2017 04:12 AM    ANTI-CENTROMERE B AB 2 01/06/2017 04:12 AM     Lab Results   Component Value Date/Time    ANCA IGG <1:20 01/06/2017 04:12 AM    C3 COMPLEMENT 178.0 01/06/2017 04:12 AM     Lab Results   Component Value Date/Time    COLOR Colorless 01/06/2017 12:30 PM    SPECIFIC GRAVITY 1.006 01/06/2017 12:30 PM    PH 7.5 01/06/2017 12:30 PM    GLUCOSE Negative 01/06/2017 12:30 PM    KETONES Negative 01/06/2017 12:30 PM    PROTEIN Negative 01/06/2017 12:30 PM     No results found for: TOTPROTUR, TOTALVOLUME, RIDLEMLA77  No results found for: SSA60, SSA52  No results found for: HBA1C  Lab Results   Component Value Date/Time    CPK TOTAL 40 01/06/2017 04:12 AM     Lab Results   Component Value Date/Time    G-6-PD 20.4* 01/10/2017 01:54 PM     No results found for:  [id number]  No results found for: ACESERUM  Lab Results   Component Value Date/Time    25-HYDROXY   VITAMIN D 25 45 01/09/2017 06:00 PM     No results found for: TSH, FREEDIR  Lab Results   Component Value Date/Time    TSH 0.740 01/05/2017 11:22 PM    FREE T-4 0.76 01/05/2017 11:22 PM     No results found for: MICROSOMALA, ANTITHYROGL  No results found for: IGGLYMEABS  No results found for: ANTIMITOCHO, FACTIN  No results found for: IGA, TTRANSIGA, ENDOIGA  No results found for: FLTYPE, CRYSTALSBDF  No results found for: ISTATICAL  No results found for: ISTATCREAT  No results found for: CTELOPEP  No results found for: GBMABG  No results found for: PTHINTACT      Medical Decision Making:  Today's Assessment / Status / Plan:     RHeumatoid Arthritis with xrays showing periarticular osteopenia and involvement of small joints of hands  Will check CCP  Will continue to taper down prednisone  I reviewed the side effects of prednisone including but not limited to hypertension, glaucoma, diabetes and osteoporosis.  I have advised him to take daily calcium and vitamin D.    I reviewed the side effects of sulfasalazine.  We discussed GI symptoms as well as changes in her CBC.  I stressed the importance of routine blood monitoring.  G6PD is normal  We will start with one tablet daily and increase to 2 tablets daily  We will also obtain feet xray    History of abnormal LFT  Will see GI today      History of retinal disease  Patient was advise to see ophthalmologist prior to starting plaquneil.    Bone health  Will have patient take calcium and vitamin D        1. Swelling of joint of hand, unspecified laterality  sulfaSALAzine (AZULFIDINE EN-TAB) 500 MG EC tablet    predniSONE (DELTASONE) 10 MG Tab    CCP    DX-JOINT SURVEY-FEET SINGLE VIEW    WESTERGREN SED RATE    CRP HIGH SENSITIVE    CBC WITH DIFFERENTIAL   2. Seronegative rheumatoid arthritis (CMS-HCC)       Return in about 4 weeks (around 2/14/2017).      I have spent  greater than 50% of this 30  minute visit in counseling/coordination of care with the patient regarding disease, side effects of medications and next steps.    She agreed and verbalized her agreement and understanding with the current plan. I answered all questions and concerns she has at this time and advised her to call at any time in there interim with questions or concerns in regards to her care.    Thank you for allowing me to participate in her care, I will continue to follow closely.

## 2017-02-06 ENCOUNTER — HOSPITAL ENCOUNTER (OUTPATIENT)
Dept: RADIOLOGY | Facility: MEDICAL CENTER | Age: 47
End: 2017-02-06
Attending: INTERNAL MEDICINE
Payer: MEDICAID

## 2017-02-06 DIAGNOSIS — D50.0 IRON DEFICIENCY ANEMIA SECONDARY TO BLOOD LOSS (CHRONIC): ICD-10-CM

## 2017-02-06 DIAGNOSIS — R74.02 NONSPECIFIC ELEVATION OF LEVELS OF TRANSAMINASE OR LACTIC ACID DEHYDROGENASE (LDH): ICD-10-CM

## 2017-02-06 DIAGNOSIS — R74.01 NONSPECIFIC ELEVATION OF LEVELS OF TRANSAMINASE OR LACTIC ACID DEHYDROGENASE (LDH): ICD-10-CM

## 2017-02-06 PROCEDURE — 74245 DX-UPPER GI-SMALL BOWEL FOLLOW THRU: CPT

## 2017-02-06 PROCEDURE — 76700 US EXAM ABDOM COMPLETE: CPT

## 2017-02-14 ENCOUNTER — OFFICE VISIT (OUTPATIENT)
Dept: RHEUMATOLOGY | Facility: PHYSICIAN GROUP | Age: 47
End: 2017-02-14
Payer: MEDICAID

## 2017-02-14 VITALS
WEIGHT: 163 LBS | RESPIRATION RATE: 12 BRPM | TEMPERATURE: 98.2 F | BODY MASS INDEX: 28.88 KG/M2 | OXYGEN SATURATION: 95 % | HEART RATE: 104 BPM | DIASTOLIC BLOOD PRESSURE: 90 MMHG | SYSTOLIC BLOOD PRESSURE: 130 MMHG

## 2017-02-14 DIAGNOSIS — J34.89 NASAL SEPTAL PERFORATION: ICD-10-CM

## 2017-02-14 DIAGNOSIS — R74.01 ELEVATED ALT MEASUREMENT: ICD-10-CM

## 2017-02-14 DIAGNOSIS — M06.00 SERONEGATIVE RHEUMATOID ARTHRITIS (HCC): ICD-10-CM

## 2017-02-14 PROCEDURE — 99214 OFFICE O/P EST MOD 30 MIN: CPT | Performed by: INTERNAL MEDICINE

## 2017-02-14 RX ORDER — PREDNISONE 5 MG/1
TABLET ORAL
Qty: 90 TAB | Refills: 1 | Status: SHIPPED | OUTPATIENT
Start: 2017-02-14 | End: 2017-03-14

## 2017-02-14 NOTE — Clinical Note
[facility]   [address], [address]  [place] [address]  Phone: [phone]  Fax: [phone]              Encounter Date: 2/14/2017    Dear Dr. Power,    It was a pleasure seeing your patient, Enedelia Pang, on 2/14/2017. Diagnoses of Seronegative rheumatoid arthritis (CMS-[facility]), Elevated ALT, and Nasal septal perforation were pertinent to this visit.     Please find attached progress note which includes the history I obtained from Ms. Pang, my physical examination findings, my impression and recommendations.      Once again, it was a pleasure participating in your patient's care.  Please feel free to contact me if you have any questions or if I can be of any further assistance to your patients.      Sincerely,    Aimee Andrade M.D.  Electronically Signed          PROGRESS NOTE:  Subjective:   Date of Consultation:2/14/2017  8:23 AM  Primary care physician:Elliott Power M.D.    Reason for Consultation:  Ms. Pang  is a pleasant 46 y.o. year old female who presented with follow-up seronegative Rheumatoid arthritis    Seronegative Rheumatoid Arthritis, non erosive  At the last visit she was on prednisone and showed some improvement.  We started sulfasalazine 500 mg BID  Since last visit Ms. Enedelia Pang states she has been continued to improve.  She did see GI and felt GI was possibly concern with starting methotrexate    Medications Compliance / Problems: compliant; no problems    Current Medications:  Sulfasalazine 500 mg BID      RHEUM HISTORY:  Ms. Enedelia Pang presented in January 2016 with joint pain and swelling of hands,knees, and weakness and a 3 month history of septal perforation.  ENT initial evaluation noted no disease activity.  She also has a history of abnormal LFT with unclear etiology.  Also she has had a recurrent rash described as papules that ulcerate identified as MRSA infections.  Her labs did show thrombocytosis which could be inflammatory  and anemia which was determined to be iron deficiency anemia.  Her rheumatologic work-up showed RF(-), MG (-), ANCA (-), SCL 70 (-) and anti centromere (-)  Parvovirus and rubella IgM was negative    Hand xrays did show periarticular osteopenia.    1/9/2017  Hepatitis B surface antigen negative  Hepatitis B core antibody negative  Hepatitis C antibody negative  quantiferon negative   HIV negative       Elevated liver enzymes  Has planned upper and lower GI  Blood work is pending    ENT  Nose biopsy done yesterday  She did follow-up with ENT and noted that her septal perforation has tripled  Concern of vasculitis    Iron deficiency anemia  On review of records evein June 2016 her hemoglobin was 8.6 MCV was 74.7    History of retinal disease  She did see ophthalmology but appointment was but short    Past Medical History:  Pseudotumor cerebri, MRSA infection history, CVA, miraines, pituatary tumor, migraine headaches, septal perforation    Past Medical History   Diagnosis Date   • Migraine with aura, with intractable migraine, so stated, without mention of status migrainosus    • H/O Clostridium difficile infection    • Hydrocephalus      with lumbar shunt   • Hx MRSA infection    • Port-a-cath in place    • Pituitary abnormality (CMS-[facility])    • Allergy, unspecified not elsewhere classified    • Anxiety    • Depression    • Stroke (CMS-[facility])      2007   • Cold    • Snoring    • Pain      Past Surgical History   Procedure Laterality Date   • Cholecystectomy     • Tonsillectomy     • Appendectomy     • Tubal ligation     • Other       right knee surgery   • Other neurological surg       occipital nerve stimulator   • Irrigation & debridement neuro N/A 6/28/2015     Procedure: IRRIGATION & DEBRIDEMENT NEURO;  Surgeon: Oli Oh III, M.D.;  Location: SURGERY [facility];  Service:    • Lumbar exploration N/A 8/31/2015     Procedure: LUMBAR EXPLORATION- Lumbar shunt removal ;  Surgeon: Oli Oh III, M.D.;   "Location: SURGERY [facility];  Service:    • Spinal cord stimulator  12/19/2016     Procedure: SPINAL CORD STIMULATOR FOR: PLACEMENT OF LEFT FLANK OCCIPITAL NERVE STIMULATOR BATTERY PACK;  Surgeon: Oli Oh III, M.D.;  Location: SURGERY [facility];  Service:      Allergies   Allergen Reactions   • Prochlorperazine Swelling     Tongue swelling. Reaction as a teen. (compazine)   • Sumatriptan Unspecified     Historical=\"Heart stops.\" Reaction  between 1995 to 1997   • Vancomycin Shortness of Breath and Rash     Reaction in 2005.  D/W patient 8/31/15 - tolerated loading dose of vancomycin administered on 8/30/15 with some itching to chest with decreased infusion rate. Red Man Syndrome     Outpatient Encounter Prescriptions as of 2/14/2017   Medication Sig Dispense Refill   • FEVERFEW PO Take  by mouth.     • sulfaSALAzine (AZULFIDINE EN-TAB) 500 MG EC tablet Start 1 tab daily x 7 days then increase to 2 tabs BID 60 Tab 1   • predniSONE (DELTASONE) 10 MG Tab Continue to decrease as instructed  3 days 30 mg then 20 mg daily x 5 days then 10 mg po q day until seen 50 Tab 0   • ferrous gluconate (FERGON) 324 (38 FE) MG Tab Take 1 Tab by mouth 2 times a day, with meals. 60 Tab 1   • calcium carbonate (CALCIUM CARBONATE) 500 MG Tab Take 1 Tab by mouth 2 times a day, with meals. 90 Tab 1   • omeprazole (PRILOSEC) 20 MG delayed-release capsule Take 1 Cap by mouth every day. 30 Cap 2   • vitamin D (VITAMIND D3) 1000 UNIT Tab Take 1 Tab by mouth every day. 60 Tab    • gabapentin (NEURONTIN) 600 MG tablet Take 600 mg by mouth 3 times a day.     • ketorolac (TORADOL) 60 MG/2ML Solution 60 mg by Intramuscular route Once PRN (migraine).     • MAGNESIUM OXIDE PO Take 1 Tab by mouth every day.     • acetaZOLAMIDE SR (DIAMOX) 500 MG CAPSULE SR 12 HR Take 500 mg by mouth 2 times a day.     • Multiple Vitamins-Minerals (MULTIVITAMIN ADULT PO) Take 1 Tab by mouth every day.     • Cyanocobalamin (VITAMIN B 12 PO) Take 1 Tab " by mouth every day.     • diphenhydrAMINE (BENADRYL) 50 MG tablet Take 1 Tab by mouth every 6 hours as needed (Headache, give with toradol). 30 Tab 0   • duloxetine (CYMBALTA) 60 MG CPEP delayed-release capsule Take 60 mg by mouth 2 times a day.     • cyclobenzaprine (FLEXERIL) 10 MG Tab Take 1 Tab by mouth 3 times a day as needed for Muscle Spasms. 30 Tab 0   • cephALEXin (KEFLEX) 500 MG Cap Take 500 mg by mouth 4 times a day. 5 day course, finished 12/24/16     • oxycodone immediate release (ROXICODONE) 10 MG immediate release tablet Take 1 Tab by mouth every four hours as needed for Moderate Pain. 100 Tab 0   • diphenhydrAMINE (BENADRYL) 50 MG/ML Solution INJECT IM EVERY 6 HOURS AS NEEDED FOR ACUTE, SEVERE MIGRAINE HEADACHE AS DIRECTED 25 mL 5     No facility-administered encounter medications on file as of 2/14/2017.       Social History     Social History   • Marital Status:      Spouse Name: N/A   • Number of Children: N/A   • Years of Education: N/A     Occupational History   • Not on file.     Social History Main Topics   • Smoking status: Never Smoker    • Smokeless tobacco: Never Used   • Alcohol Use: No   • Drug Use: No   • Sexual Activity: Not on file     Other Topics Concern   • Not on file     Social History Narrative      History   Smoking status   • Never Smoker    Smokeless tobacco   • Never Used     History   Alcohol Use No     History   Drug Use No      OB History   No data available      No LMP recorded.    G P A L     No family history on file.    Review of Systems   Constitutional: Positive for fever. Negative for chills.   Respiratory: Negative for cough and hemoptysis.         Objective:   /90 mmHg  Pulse 104  Temp(Src) 36.8 °C (98.2 °F)  Resp 12  Wt 73.936 kg (163 lb)  SpO2 95%  Breastfeeding? No    Physical Exam   Constitutional: She is oriented to person, place, and time. She appears well-developed and well-nourished. No distress.   HENT:   Head: Normocephalic and  atraumatic.   Right Ear: External ear normal.   Left Ear: External ear normal.   Eyes: Conjunctivae are normal. Pupils are equal, round, and reactive to light. Right eye exhibits no discharge. Left eye exhibits no discharge. No scleral icterus.   Pulmonary/Chest: Effort normal. No stridor. No respiratory distress. She has no wheezes. She has no rales.   Abdominal: Soft.   Musculoskeletal:   Good ROM of her wrists bilateral.  Good ROM flexion and extension of elbows bilateral.  Mild tenderness on palpation of MCP and PIP.   Neurological: She is alert and oriented to person, place, and time.   Skin: Skin is warm and dry. She is not diaphoretic.   Old lesions of her rash.  No new lesions appreciated   Psychiatric: She has a normal mood and affect. Her behavior is normal. Judgment and thought content normal.       Assessment:   No diagnosis found.  Labs:    Lab Results   Component Value Date/Time    QUANTIFERON TB GOLD Negative 01/09/2017 06:01 PM     Lab Results   Component Value Date/Time    HEPATITIS B CORS AB,IGM Negative 01/09/2017 06:00 PM    HEPATITIS B SURFACE ANTIGEN Negative 01/09/2017 06:00 PM     Lab Results   Component Value Date/Time    HEPATITIS C ANTIBODY Negative 01/09/2017 06:00 PM     Lab Results   Component Value Date/Time    SODIUM 139 01/11/2017 02:29 PM    POTASSIUM 3.6 01/11/2017 02:29 PM    CHLORIDE 106 01/11/2017 02:29 PM    CO2 22 01/11/2017 02:29 PM    GLUCOSE 100* 01/11/2017 02:29 PM    BUN 16 01/11/2017 02:29 PM    CREATININE 0.74 01/11/2017 02:29 PM      Lab Results   Component Value Date/Time    WBC 12.5* 01/11/2017 02:29 PM    RBC 5.12 01/11/2017 02:29 PM    HEMOGLOBIN 10.9* 01/11/2017 02:29 PM    HEMATOCRIT 38.4 01/11/2017 02:29 PM    MCV 75.0* 01/11/2017 02:29 PM    MCH 21.3* 01/11/2017 02:29 PM    MCHC 28.4* 01/11/2017 02:29 PM    MPV 9.6 01/11/2017 02:29 PM    NEUTROPHILS-POLYS 85.10* 01/11/2017 02:29 PM    LYMPHOCYTES 7.00* 01/11/2017 02:29 PM    MONOCYTES 1.80 01/11/2017 02:29 PM       EOSINOPHILS 0.00 01/11/2017 02:29 PM    BASOPHILS 2.60* 01/11/2017 02:29 PM    HYPOCHROMIA 1+ 01/11/2017 02:29 PM    ANISOCYTOSIS 1+ 01/11/2017 02:29 PM      Lab Results   Component Value Date/Time    CALCIUM 9.8 01/11/2017 02:29 PM    AST(SGOT) 15 01/11/2017 02:29 PM    ALT(SGPT) 23 01/11/2017 02:29 PM    ALKALINE PHOSPHATASE 122* 01/11/2017 02:29 PM    TOTAL BILIRUBIN 0.2 01/11/2017 02:29 PM    ALBUMIN 4.5 01/11/2017 02:29 PM    TOTAL PROTEIN 8.2 01/11/2017 02:29 PM     Lab Results   Component Value Date/Time    RHEUMATOID FACTOR -NEPH- <10 01/06/2017 04:12 AM    ANTINUCLEAR ANTIBODY None Detected 01/06/2017 04:12 AM     Lab Results   Component Value Date/Time    SED RATE WESTERGREN 66* 01/05/2017 11:22 PM    STAT C-REACTIVE PROTEIN 7.92* 01/05/2017 11:22 PM     No results found for: RUSSELVIPER, DRVVTINTERP  Lab Results   Component Value Date/Time    C3 COMPLEMENT 178.0 01/06/2017 04:12 AM    COMPLEMENT C4 37.0 01/06/2017 04:12 AM     Lab Results   Component Value Date/Time    ANTI-SCL-70 0 01/06/2017 04:12 AM    ANTI-CENTROMERE B AB 2 01/06/2017 04:12 AM     Lab Results   Component Value Date/Time    ANCA IGG <1:20 01/06/2017 04:12 AM    C3 COMPLEMENT 178.0 01/06/2017 04:12 AM     Lab Results   Component Value Date/Time    COLOR Colorless 01/06/2017 12:30 PM    SPECIFIC GRAVITY 1.006 01/06/2017 12:30 PM    PH 7.5 01/06/2017 12:30 PM    GLUCOSE Negative 01/06/2017 12:30 PM    KETONES Negative 01/06/2017 12:30 PM    PROTEIN Negative 01/06/2017 12:30 PM     No results found for: TOTPROTUR, TOTALVOLUME, [id number]  No results found for: SSA60, SSA52  No results found for: HBA1C  Lab Results   Component Value Date/Time    CPK TOTAL 40 01/06/2017 04:12 AM     Lab Results   Component Value Date/Time    G-6-PD 20.4* 01/10/2017 01:54 PM     No results found for: TLDO71UPMZ  No results found for: ACESERUM  Lab Results   Component Value Date/Time    25-HYDROXY   VITAMIN D 25 45 01/09/2017 06:00 PM     No results found  for: TSH, FREEDIR  Lab Results   Component Value Date/Time    TSH 0.740 01/05/2017 11:22 PM    FREE T-4 0.76 01/05/2017 11:22 PM     No results found for: MICROSOMALA, ANTITHYROGL  No results found for: IGGLYMEABS  No results found for: ANTIMITOCHO, FACTIN  No results found for: IGA, TTRANSIGA, ENDOIGA  No results found for: FLTYPE, CRYSTALSBDF  No results found for: ISTATICAL  No results found for: ISTATCREAT  No results found for: CTELOPEP  No results found for: GBMABG  No results found for: PTHINTACT  1/27/2015 hepatitis E IgM    1/27/2015 ceruloplasmin = 29.7  1/27/2015 smith antibody = 9 (normal)  1/27/2015 MG negative  1/27/2015 alpha antitrypsin 119 ()  1/27/2015 ferritin = 75 (normal)  1/27/2015 GGT = 327 (0-55)    1/27/2015 IgG 4 = 11  10/31/2016 and 1/27/2015  p ANCA (-), c ANCA (-), PR3 (-)  10/31/2016 MPO (-)  12/7/2014 AST = 23 ALT = 137 Alk phos = 205  6/10/2016 ALT = 921 AST = 821 Alk phos = 537  Labs from JUne 2016 shows low albumin 2.7 and AST 92 and ALT of 370    6/11/2016; AST=92, ALT  = 370 Alk Phos = 331 Hgb = 8.6 MCV = 74.7 t bili = 1.2  10/15/2015: Hgb = 9.1 Plt =   ALT= 357 AST =127 Alk phos = 469 creatinine = 0.7    US LE venous doppler bilateral 6/30/2014  Probable baker's cyst on left lower extremity    Left hand XR 8/14/2014  Soft tissue swelling over the dorsum.  No erorsons no fractures     CT abdomen/Pelvis w/o Contrast 9/4/2014  Small amount of fluid in cul-de-sac no abnormalities in liver.  Abdominal aorta was normal.  Adrenal glands normal.  coritical medullatry calcifications noted of left kidney    CT head w w/o contrast 10/15/2016  No acute abnormalities and on CT maxillofacial w/ contrast - no evidence of sinusitis    CTchest w/con 1/12/2016  No findings to suggest aortic dissection or pulmonary embolus  Left renal 18 mm cyst in the ventral cortex medially    CT spine lumbar w/o contrast  8/30/2015  Small fluid  Collection posterior junction of the epidural catheter seen  right lateral to the L2 posterior spinous process consistent with leakage and/or infection      Medical Decision Making:  Today's Assessment / Status / Plan:     Seronegative rheumatoid arthritis with involvement of the small joints if the hands and wrists and periarticular osteopenia on xray  At last visit we started sulfasalazine.  She has some improvement.  I would like to start methotrexate but will need to follow-up on GI work-up and recommendations.  In review of her previous LFT it has been elevated in the past.  Previous evaluation noted negative  Ceruloplasmin, normal ferritin, negative IgG4.    Septal perforation  ANCA serologies are negative x 3  Will f/u on biopsy results  MG Is negative  Will  Obtain CXR      Abnormal LFT  F/u endoscopy and colonoscopy      1. Seronegative rheumatoid arthritis (CMS-HCC)  predniSONE (DELTASONE) 5 MG Tab    DX-CHEST-2 VIEWS       I have spent greater than 50% of this 30 minute visit in counseling/coordination of care with the patient regarding review of her records, discussion of next steps.    She agreed and verbalized her agreement and understanding with the current plan. I answered all questions and concerns she has at this time and advised her to call at any time in there interim with questions or concerns in regards to her care.    Thank you for allowing me to participate in her care, I will continue to follow closely.

## 2017-02-14 NOTE — PROGRESS NOTES
Subjective:   Date of Consultation:2/14/2017  8:23 AM  Primary care physician:Elliott Power M.D.    Reason for Consultation:  Ms. Pang  is a pleasant 46 y.o. year old female who presented with follow-up seronegative Rheumatoid arthritis    Seronegative Rheumatoid Arthritis, non erosive  At the last visit she was on prednisone and showed some improvement.  We started sulfasalazine 500 mg BID  Since last visit Ms. Enedelia Pang states she has been continued to improve.  She did see GI and felt GI was possibly concern with starting methotrexate    Medications Compliance / Problems: compliant; no problems    Current Medications:  Sulfasalazine 500 mg BID      RHEUM HISTORY:  Ms. Enedelia Pang presented in January 2016 with joint pain and swelling of hands,knees, and weakness and a 3 month history of septal perforation.  ENT initial evaluation noted no disease activity.  She also has a history of abnormal LFT with unclear etiology.  Also she has had a recurrent rash described as papules that ulcerate identified as MRSA infections.  Her labs did show thrombocytosis which could be inflammatory and anemia which was determined to be iron deficiency anemia.  Her rheumatologic work-up showed RF(-), MG (-), ANCA (-), SCL 70 (-) and anti centromere (-)  Parvovirus and rubella IgM was negative    Hand xrays did show periarticular osteopenia.    1/9/2017  Hepatitis B surface antigen negative  Hepatitis B core antibody negative  Hepatitis C antibody negative  quantiferon negative   HIV negative       Elevated liver enzymes  Has planned upper and lower GI  Blood work is pending    ENT  Nose biopsy done yesterday  She did follow-up with ENT and noted that her septal perforation has tripled  Concern of vasculitis    Iron deficiency anemia  On review of records evein June 2016 her hemoglobin was 8.6 MCV was 74.7    History of retinal disease  She did see ophthalmology but appointment was but short    Past Medical  "History:  Pseudotumor cerebri, MRSA infection history, CVA, miraines, pituatary tumor, migraine headaches, septal perforation    Past Medical History   Diagnosis Date   • Migraine with aura, with intractable migraine, so stated, without mention of status migrainosus    • H/O Clostridium difficile infection    • Hydrocephalus      with lumbar shunt   • Hx MRSA infection    • Port-a-cath in place    • Pituitary abnormality (CMS-HCC)    • Allergy, unspecified not elsewhere classified    • Anxiety    • Depression    • Stroke (CMS-HCC)      2007   • Cold    • Snoring    • Pain      Past Surgical History   Procedure Laterality Date   • Cholecystectomy     • Tonsillectomy     • Appendectomy     • Tubal ligation     • Other       right knee surgery   • Other neurological surg       occipital nerve stimulator   • Irrigation & debridement neuro N/A 6/28/2015     Procedure: IRRIGATION & DEBRIDEMENT NEURO;  Surgeon: Oli Oh III, M.D.;  Location: SURGERY [facility];  Service:    • Lumbar exploration N/A 8/31/2015     Procedure: LUMBAR EXPLORATION- Lumbar shunt removal ;  Surgeon: Oli Oh III, M.D.;  Location: SURGERY [facility];  Service:    • Spinal cord stimulator  12/19/2016     Procedure: SPINAL CORD STIMULATOR FOR: PLACEMENT OF LEFT FLANK OCCIPITAL NERVE STIMULATOR BATTERY PACK;  Surgeon: Oli Oh III, M.D.;  Location: SURGERY [facility];  Service:      Allergies   Allergen Reactions   • Prochlorperazine Swelling     Tongue swelling. Reaction as a teen. (compazine)   • Sumatriptan Unspecified     Historical=\"Heart stops.\" Reaction  between 1995 to 1997   • Vancomycin Shortness of Breath and Rash     Reaction in 2005.  D/W patient 8/31/15 - tolerated loading dose of vancomycin administered on 8/30/15 with some itching to chest with decreased infusion rate. Red Man Syndrome     Outpatient Encounter Prescriptions as of 2/14/2017   Medication Sig Dispense Refill   • FEVERFEW PO Take  by mouth. "     • sulfaSALAzine (AZULFIDINE EN-TAB) 500 MG EC tablet Start 1 tab daily x 7 days then increase to 2 tabs BID 60 Tab 1   • predniSONE (DELTASONE) 10 MG Tab Continue to decrease as instructed  3 days 30 mg then 20 mg daily x 5 days then 10 mg po q day until seen 50 Tab 0   • ferrous gluconate (FERGON) 324 (38 FE) MG Tab Take 1 Tab by mouth 2 times a day, with meals. 60 Tab 1   • calcium carbonate (CALCIUM CARBONATE) 500 MG Tab Take 1 Tab by mouth 2 times a day, with meals. 90 Tab 1   • omeprazole (PRILOSEC) 20 MG delayed-release capsule Take 1 Cap by mouth every day. 30 Cap 2   • vitamin D (VITAMIND D3) 1000 UNIT Tab Take 1 Tab by mouth every day. 60 Tab    • gabapentin (NEURONTIN) 600 MG tablet Take 600 mg by mouth 3 times a day.     • ketorolac (TORADOL) 60 MG/2ML Solution 60 mg by Intramuscular route Once PRN (migraine).     • MAGNESIUM OXIDE PO Take 1 Tab by mouth every day.     • acetaZOLAMIDE SR (DIAMOX) 500 MG CAPSULE SR 12 HR Take 500 mg by mouth 2 times a day.     • Multiple Vitamins-Minerals (MULTIVITAMIN ADULT PO) Take 1 Tab by mouth every day.     • Cyanocobalamin (VITAMIN B 12 PO) Take 1 Tab by mouth every day.     • diphenhydrAMINE (BENADRYL) 50 MG tablet Take 1 Tab by mouth every 6 hours as needed (Headache, give with toradol). 30 Tab 0   • duloxetine (CYMBALTA) 60 MG CPEP delayed-release capsule Take 60 mg by mouth 2 times a day.     • cyclobenzaprine (FLEXERIL) 10 MG Tab Take 1 Tab by mouth 3 times a day as needed for Muscle Spasms. 30 Tab 0   • cephALEXin (KEFLEX) 500 MG Cap Take 500 mg by mouth 4 times a day. 5 day course, finished 12/24/16     • oxycodone immediate release (ROXICODONE) 10 MG immediate release tablet Take 1 Tab by mouth every four hours as needed for Moderate Pain. 100 Tab 0   • diphenhydrAMINE (BENADRYL) 50 MG/ML Solution INJECT IM EVERY 6 HOURS AS NEEDED FOR ACUTE, SEVERE MIGRAINE HEADACHE AS DIRECTED 25 mL 5     No facility-administered encounter medications on file as of  2/14/2017.       Social History     Social History   • Marital Status:      Spouse Name: N/A   • Number of Children: N/A   • Years of Education: N/A     Occupational History   • Not on file.     Social History Main Topics   • Smoking status: Never Smoker    • Smokeless tobacco: Never Used   • Alcohol Use: No   • Drug Use: No   • Sexual Activity: Not on file     Other Topics Concern   • Not on file     Social History Narrative      History   Smoking status   • Never Smoker    Smokeless tobacco   • Never Used     History   Alcohol Use No     History   Drug Use No      OB History   No data available      No LMP recorded.    [facility]     No family history on file.    Review of Systems   Constitutional: Positive for fever. Negative for chills.   Respiratory: Negative for cough and hemoptysis.         Objective:   /90 mmHg  Pulse 104  Temp(Src) 36.8 °C (98.2 °F)  Resp 12  Wt 73.936 kg (163 lb)  SpO2 95%  Breastfeeding? No    Physical Exam   Constitutional: She is oriented to person, place, and time. She appears well-developed and well-nourished. No distress.   HENT:   Head: Normocephalic and atraumatic.   Right Ear: External ear normal.   Left Ear: External ear normal.   Eyes: Conjunctivae are normal. Pupils are equal, round, and reactive to light. Right eye exhibits no discharge. Left eye exhibits no discharge. No scleral icterus.   Pulmonary/Chest: Effort normal. No stridor. No respiratory distress. She has no wheezes. She has no rales.   Abdominal: Soft.   Musculoskeletal:   Good ROM of her wrists bilateral.  Good ROM flexion and extension of elbows bilateral.  Mild tenderness on palpation of MCP and PIP.   Neurological: She is alert and oriented to person, place, and time.   Skin: Skin is warm and dry. She is not diaphoretic.   Old lesions of her rash.  No new lesions appreciated   Psychiatric: She has a normal mood and affect. Her behavior is normal. Judgment and thought content normal.        Assessment:   No diagnosis found.  Labs:    Lab Results   Component Value Date/Time    QUANTIFERON TB GOLD Negative 01/09/2017 06:01 PM     Lab Results   Component Value Date/Time    HEPATITIS B CORS AB,IGM Negative 01/09/2017 06:00 PM    HEPATITIS B SURFACE ANTIGEN Negative 01/09/2017 06:00 PM     Lab Results   Component Value Date/Time    HEPATITIS C ANTIBODY Negative 01/09/2017 06:00 PM     Lab Results   Component Value Date/Time    SODIUM 139 01/11/2017 02:29 PM    POTASSIUM 3.6 01/11/2017 02:29 PM    CHLORIDE 106 01/11/2017 02:29 PM    CO2 22 01/11/2017 02:29 PM    GLUCOSE 100* 01/11/2017 02:29 PM    BUN 16 01/11/2017 02:29 PM    CREATININE 0.74 01/11/2017 02:29 PM      Lab Results   Component Value Date/Time    WBC 12.5* 01/11/2017 02:29 PM    RBC 5.12 01/11/2017 02:29 PM    HEMOGLOBIN 10.9* 01/11/2017 02:29 PM    HEMATOCRIT 38.4 01/11/2017 02:29 PM    MCV 75.0* 01/11/2017 02:29 PM    MCH 21.3* 01/11/2017 02:29 PM    MCHC 28.4* 01/11/2017 02:29 PM    MPV 9.6 01/11/2017 02:29 PM    NEUTROPHILS-POLYS 85.10* 01/11/2017 02:29 PM    LYMPHOCYTES 7.00* 01/11/2017 02:29 PM    MONOCYTES 1.80 01/11/2017 02:29 PM    EOSINOPHILS 0.00 01/11/2017 02:29 PM    BASOPHILS 2.60* 01/11/2017 02:29 PM    HYPOCHROMIA 1+ 01/11/2017 02:29 PM    ANISOCYTOSIS 1+ 01/11/2017 02:29 PM      Lab Results   Component Value Date/Time    CALCIUM 9.8 01/11/2017 02:29 PM    AST(SGOT) 15 01/11/2017 02:29 PM    ALT(SGPT) 23 01/11/2017 02:29 PM    ALKALINE PHOSPHATASE 122* 01/11/2017 02:29 PM    TOTAL BILIRUBIN 0.2 01/11/2017 02:29 PM    ALBUMIN 4.5 01/11/2017 02:29 PM    TOTAL PROTEIN 8.2 01/11/2017 02:29 PM     Lab Results   Component Value Date/Time    RHEUMATOID FACTOR -NEPH- <10 01/06/2017 04:12 AM    ANTINUCLEAR ANTIBODY None Detected 01/06/2017 04:12 AM     Lab Results   Component Value Date/Time    SED RATE WESTERGREN 66* 01/05/2017 11:22 PM    STAT C-REACTIVE PROTEIN 7.92* 01/05/2017 11:22 PM     No results found for: EMMANUEL  DRVVTINTERP  Lab Results   Component Value Date/Time    C3 COMPLEMENT 178.0 01/06/2017 04:12 AM    COMPLEMENT C4 37.0 01/06/2017 04:12 AM     Lab Results   Component Value Date/Time    ANTI-SCL-70 0 01/06/2017 04:12 AM    ANTI-CENTROMERE B AB 2 01/06/2017 04:12 AM     Lab Results   Component Value Date/Time    ANCA IGG <1:20 01/06/2017 04:12 AM    C3 COMPLEMENT 178.0 01/06/2017 04:12 AM     Lab Results   Component Value Date/Time    COLOR Colorless 01/06/2017 12:30 PM    SPECIFIC GRAVITY 1.006 01/06/2017 12:30 PM    PH 7.5 01/06/2017 12:30 PM    GLUCOSE Negative 01/06/2017 12:30 PM    KETONES Negative 01/06/2017 12:30 PM    PROTEIN Negative 01/06/2017 12:30 PM     No results found for: TOTPROTUR, TOTALVOLUME, FURIUZIE43  No results found for: SSA60, SSA52  No results found for: HBA1C  Lab Results   Component Value Date/Time    CPK TOTAL 40 01/06/2017 04:12 AM     Lab Results   Component Value Date/Time    G-6-PD 20.4* 01/10/2017 01:54 PM     No results found for: [id number]  No results found for: ACESERUM  Lab Results   Component Value Date/Time    25-HYDROXY   VITAMIN D 25 45 01/09/2017 06:00 PM     No results found for: TSH, FREEDIR  Lab Results   Component Value Date/Time    TSH 0.740 01/05/2017 11:22 PM    FREE T-4 0.76 01/05/2017 11:22 PM     No results found for: MICROSOMALA, ANTITHYROGL  No results found for: IGGLYMEABS  No results found for: ANTIMITOCHO, FACTIN  No results found for: IGA, TTRANSIGA, ENDOIGA  No results found for: FLTYPE, CRYSTALSBDF  No results found for: ISTATICAL  No results found for: ISTATCREAT  No results found for: CTELOPEP  No results found for: GBMABG  No results found for: PTHINTACT  1/27/2015 hepatitis E IgM    1/27/2015 ceruloplasmin = 29.7  1/27/2015 smith antibody = 9 (normal)  1/27/2015 MG negative  1/27/2015 alpha antitrypsin 119 ()  1/27/2015 ferritin = 75 (normal)  1/27/2015 GGT = 327 (0-55)    1/27/2015 IgG 4 = 11  10/31/2016 and 1/27/2015  p ANCA (-), c ANCA (-),  PR3 (-)  10/31/2016 MPO (-)  12/7/2014 AST = 23 ALT = 137 Alk phos = 205  6/10/2016 ALT = 921 AST = 821 Alk phos = 537  Labs from JUne 2016 shows low albumin 2.7 and AST 92 and ALT of 370    6/11/2016; AST=92, ALT  = 370 Alk Phos = 331 Hgb = 8.6 MCV = 74.7 t bili = 1.2  10/15/2015: Hgb = 9.1 Plt =   ALT= 357 AST =127 Alk phos = 469 creatinine = 0.7    US LE venous doppler bilateral 6/30/2014  Probable baker's cyst on left lower extremity    Left hand XR 8/14/2014  Soft tissue swelling over the dorsum.  No erorsons no fractures     CT abdomen/Pelvis w/o Contrast 9/4/2014  Small amount of fluid in cul-de-sac no abnormalities in liver.  Abdominal aorta was normal.  Adrenal glands normal.  coritical medullatry calcifications noted of left kidney    CT head w w/o contrast 10/15/2016  No acute abnormalities and on CT maxillofacial w/ contrast - no evidence of sinusitis    CTchest w/con 1/12/2016  No findings to suggest aortic dissection or pulmonary embolus  Left renal 18 mm cyst in the ventral cortex medially    CT spine lumbar w/o contrast  8/30/2015  Small fluid  Collection posterior junction of the epidural catheter seen right lateral to the L2 posterior spinous process consistent with leakage and/or infection      Medical Decision Making:  Today's Assessment / Status / Plan:     Seronegative rheumatoid arthritis with involvement of the small joints if the hands and wrists and periarticular osteopenia on xray  At last visit we started sulfasalazine.  She has some improvement.  I would like to start methotrexate but will need to follow-up on GI work-up and recommendations.  In review of her previous LFT it has been elevated in the past.  Previous evaluation noted negative  Ceruloplasmin, normal ferritin, negative IgG4.    Septal perforation  ANCA serologies are negative x 3  Will f/u on biopsy results  MG Is negative  Will  Obtain CXR      Abnormal LFT  F/u endoscopy and colonoscopy      1. Seronegative rheumatoid  arthritis (CMS-[facility])  predniSONE (DELTASONE) 5 MG Tab    DX-CHEST-2 VIEWS       I have spent greater than 50% of this 30 minute visit in counseling/coordination of care with the patient regarding review of her records, discussion of next steps.    She agreed and verbalized her agreement and understanding with the current plan. I answered all questions and concerns she has at this time and advised her to call at any time in there interim with questions or concerns in regards to her care.    Thank you for allowing me to participate in her care, I will continue to follow closely.

## 2017-02-14 NOTE — MR AVS SNAPSHOT
"Enedelia Pang   2017 8:30 AM   Office Visit   MRN: [id number]    Department:  Rheumatology [facility]   Dept Phone:  [phone]    Description:  Female : 1970   Provider:  Aimee Andrade M.D.           Reason for Visit     Follow-Up follow up      Allergies as of 2017     Allergen Noted Reactions    Prochlorperazine 2015   Swelling    Tongue swelling. Reaction as a teen. (compazine)    Sumatriptan 2015   Unspecified    Historical=\"Heart stops.\" Reaction  between  to     Vancomycin 2015   Shortness of Breath, Rash    Reaction in .  D/W patient 8/31/15 - tolerated loading dose of vancomycin administered on 8/30/15 with some itching to chest with decreased infusion rate. Red Man Syndrome      You were diagnosed with     Seronegative rheumatoid arthritis (CMS-HCC)   [638318]         Vital Signs     Blood Pressure Pulse Temperature Respirations Weight Oxygen Saturation    130/90 mmHg 104 36.8 °C (98.2 °F) 12 73.936 kg (163 lb) 95%    Breastfeeding? Smoking Status                No Never Smoker           Basic Information     Date Of Birth Sex Race Ethnicity Preferred Language    1970 Female White Non- English      Your appointments     Mar 29, 2017  7:30 AM   Follow Up Visit with Aimee Andrade M.D.   [facility] (--)    [address] [place] [address] [phone] [phone]           You will be receiving a confirmation call a few days before your appointment from our automated call confirmation system.              Problem List              ICD-10-CM Priority Class Noted - Resolved    Paresthesia of right arm R20.2   10/9/2014 - Present    Intestinal infection due to Clostridium difficile A04.7 Medium  10/10/2014 - Present    R Hemiparesis G81.90 High  10/29/2014 - Present    Complicated migraine G43.109 Medium  10/29/2014 - Present    Osteomyelitis of finger of left hand (CMS-[facility]) M86.9 Medium  10/30/2014 - Present    Elevated ALT R74.0 "   11/4/2014 - Present    Intractable migraine G43.919 Medium  3/21/2015 - Present    Low blood pressure reading R03.1   3/22/2015 - Present    Dermatitis- chronic L30.9   3/24/2015 - Present    Wounds, multiple T07   4/23/2015 - Present    Hydrocephalus G91.9   4/23/2015 - Present    Back pain M54.9 High  6/28/2015 - Present    SIRS (systemic inflammatory response syndrome) (CMS-HCC) R65.10   6/28/2015 - Present    S/P  shunt Z98.2   6/29/2015 - Present    Anxiety F41.9   6/29/2015 - Present    Depression F32.9   6/29/2015 - Present    Headache R51   7/3/2015 - Present    Abdominal pain R10.9   7/3/2015 - Present    Hypokalemia E87.6   8/30/2015 - Present    Lactic acidosis E87.2   8/30/2015 - Present    Hypomagnesemia E83.42   8/30/2015 - Present    Leukocytosis D72.829   8/30/2015 - Present    Sepsis (CMS-HCC) A41.9   8/30/2015 - Present    Intractable low back pain M54.5   8/30/2015 - Present    Hypocalcemia E83.51   8/30/2015 - Present    Septic shock (CMS-HCC) A41.9, R65.21 High  8/31/2015 - Present    Normocytic anemia D64.9   8/31/2015 - Present    Thrombocytopenia (CMS-HCC) D69.6   8/31/2015 - Present    Infected venous access port T80.219A   10/3/2015 - Present    Migraine G43.909   10/4/2015 - Present    Chronic pain G89.29 High  10/4/2015 - Present    Bacteremia R78.81   10/4/2015 - Present    Chronic pain syndrome G89.4 Low  12/15/2016 - Present    Chronic pain disorder G89.4   12/19/2016 - Present    Swelling of joint of hand M25.449 High  1/6/2017 - Present    Seronegative rheumatoid arthritis (CMS-HCC) M06.00   1/17/2017 - Present      Health Maintenance        Date Due Completion Dates    IMM DTaP/Tdap/Td Vaccine (1 - Tdap) 8/16/1991 ---    PAP SMEAR 8/16/1993 ---    MAMMOGRAM 8/16/2012 ---            Current Immunizations     Influenza Vaccine Quad Inj (Pf) 1/11/2017  8:57 AM, 10/9/2014  9:45 AM    Influenza Vaccine Quad Inj (Preserved) 10/3/2015  4:06 PM      Below and/or attached are the  medications your provider expects you to take. Review all of your home medications and newly ordered medications with your provider and/or pharmacist. Follow medication instructions as directed by your provider and/or pharmacist. Please keep your medication list with you and share with your provider. Update the information when medications are discontinued, doses are changed, or new medications (including over-the-counter products) are added; and carry medication information at all times in the event of emergency situations     Allergies:  PROCHLORPERAZINE - Swelling     SUMATRIPTAN - Unspecified     VANCOMYCIN - Shortness of Breath,Rash               Medications  Valid as of: February 14, 2017 -  9:25 AM    Generic Name Brand Name Tablet Size Instructions for use    AcetaZOLAMIDE (CAPSULE SR 12 HR) DIAMOX 500 MG Take 500 mg by mouth 2 times a day.        Cephalexin (Cap) KEFLEX 500 MG Take 500 mg by mouth 4 times a day. 5 day course, finished 12/24/16        Cholecalciferol (Tab) VITAMIND D3 1000 UNIT Take 1 Tab by mouth every day.        Cyanocobalamin   Take 1 Tab by mouth every day.        Cyclobenzaprine HCl (Tab) FLEXERIL 10 MG Take 1 Tab by mouth 3 times a day as needed for Muscle Spasms.        DiphenhydrAMINE HCl (Tab) BENADRYL 50 MG Take 1 Tab by mouth every 6 hours as needed (Headache, give with toradol).        DiphenhydrAMINE HCl (Solution) BENADRYL 50 MG/ML INJECT IM EVERY 6 HOURS AS NEEDED FOR ACUTE, SEVERE MIGRAINE HEADACHE AS DIRECTED        DULoxetine HCl (Cap DR Particles) CYMBALTA 60 MG Take 60 mg by mouth 2 times a day.        Ferrous Gluconate (Tab) FERGON 324 (38 FE) MG Take 1 Tab by mouth 2 times a day, with meals.        Feverfew   Take  by mouth.        Gabapentin (Tab) NEURONTIN 600 MG Take 600 mg by mouth 3 times a day.        Ketorolac Tromethamine (Solution) TORADOL 60 MG/2ML 60 mg by Intramuscular route Once PRN (migraine).        Magnesium Oxide   Take 1 Tab by mouth every day.         Multiple Vitamins-Minerals   Take 1 Tab by mouth every day.        Omeprazole (CAPSULE DELAYED RELEASE) PRILOSEC 20 MG Take 1 Cap by mouth every day.        OxyCODONE HCl (Tab) ROXICODONE 10 MG Take 1 Tab by mouth every four hours as needed for Moderate Pain.        Oyster Shell (Tab) OS-ZAIN 500 500 MG Take 1 Tab by mouth 2 times a day, with meals.        PredniSONE (Tab) DELTASONE 5 MG 15 mg po q day x 7 days then 12.5 mg x 7 days then 10 mg until seen.        SulfaSALAzine (Tablet Delayed Response) AZULFIDINE EN-tab 500 MG Start 1 tab daily x 7 days then increase to 2 tabs BID        .                 Medicines prescribed today were sent to:     [facility] [place] - [address]    [address] [place] [address]    Phone: [phone] Fax: [phone]    Open 24 Hours?: No      Medication refill instructions:       If your prescription bottle indicates you have medication refills left, it is not necessary to call your provider’s office. Please contact your pharmacy and they will refill your medication.    If your prescription bottle indicates you do not have any refills left, you may request refills at any time through one of the following ways: The online [employer] system (except Urgent Care), by calling your provider’s office, or by asking your pharmacy to contact your provider’s office with a refill request. Medication refills are processed only during regular business hours and may not be available until the next business day. Your provider may request additional information or to have a follow-up visit with you prior to refilling your medication.   *Please Note: Medication refills are assigned a new Rx number when refilled electronically. Your pharmacy may indicate that no refills were authorized even though a new prescription for the same medication is available at the pharmacy. Please request the medicine by name with the pharmacy before contacting your provider for a refill.        Your To Do List        Future Labs/Procedures Complete By Expires    DX-CHEST-2 VIEWS  As directed 2/14/2018         [employer] Access Code: Activation code not generated  Current [employer] Status: Active

## 2017-02-16 ENCOUNTER — TELEPHONE (OUTPATIENT)
Dept: RHEUMATOLOGY | Facility: PHYSICIAN GROUP | Age: 47
End: 2017-02-16

## 2017-02-17 NOTE — TELEPHONE ENCOUNTER
Reviewed labs from 1/17/2017  Mitochondrial antibodies were 14.9 - negative  CRP < 0.5  CCP = 3    WBC = 13.8 amd Hgb = 11.5 and platelt = 604      Feet xray 1/17/2017 was normal

## 2017-02-20 ASSESSMENT — ENCOUNTER SYMPTOMS
FEVER: 1
HEMOPTYSIS: 0
CHILLS: 0
COUGH: 0

## 2017-03-10 ENCOUNTER — TELEPHONE (OUTPATIENT)
Dept: RHEUMATOLOGY | Facility: PHYSICIAN GROUP | Age: 47
End: 2017-03-10

## 2017-03-10 DIAGNOSIS — M13.80 SERONEGATIVE ARTHRITIS: ICD-10-CM

## 2017-03-10 NOTE — TELEPHONE ENCOUNTER
1. Caller Name: Enedelia Pang                                         Call Back Number: [phone]      Patient approves a detailed voicemail message: yes    Pt called at 3:35pm ( after Dr Gill had already left for the day) stating she was having a lot of increased pain and swelling in bilat hands and increased R shoulder pain since having decreased her Prednisone to 10 mg q day over the last week. I advised patient to increase her Prednisone back up to 15mg daily through the weekend and to take some advil or aleve prn through the weekend to help with the pain. I told the Enedelia that I would have you call her on Tue and touch base with her regarding her Prednisone dose and your advise on where to go from here. She agreed with the plan and will await a call from you.  Thank you,  Shankar

## 2017-03-14 RX ORDER — FOLIC ACID 1 MG/1
1 TABLET ORAL DAILY
Qty: 30 TAB | Refills: 11 | Status: SHIPPED | OUTPATIENT
Start: 2017-03-14 | End: 2017-10-05

## 2017-03-14 RX ORDER — PREDNISONE 5 MG/1
TABLET ORAL
Qty: 150 TAB | Refills: 0 | Status: SHIPPED | OUTPATIENT
Start: 2017-03-14 | End: 2017-04-29

## 2017-03-14 RX ORDER — SULFASALAZINE 500 MG/1
1000 TABLET ORAL 2 TIMES DAILY
Qty: 120 TAB | Refills: 3 | Status: ON HOLD | OUTPATIENT
Start: 2017-03-14 | End: 2017-07-24

## 2017-03-15 NOTE — TELEPHONE ENCOUNTER
Called patient we did increase her prednisone but there is no improvement.      Ultrasound of liver was negative will start methotrexate. Since LFT are normal.  Also will start folic acid.      Advise patient to increase prednisone to 30 mg po q day and decrease every 7 days  Until 10 mg po q day and stay there until seen.    She has been on sulfasalazine 1000 mg BID with no problems.  Will recheck labs.    Will recheck labs also 1 months from starting methotrexate.

## 2017-03-29 ENCOUNTER — APPOINTMENT (OUTPATIENT)
Dept: RHEUMATOLOGY | Facility: PHYSICIAN GROUP | Age: 47
End: 2017-03-29

## 2017-03-30 ENCOUNTER — TELEPHONE (OUTPATIENT)
Dept: RHEUMATOLOGY | Facility: PHYSICIAN GROUP | Age: 47
End: 2017-03-30

## 2017-03-30 NOTE — TELEPHONE ENCOUNTER
Called patient to follow-up on her symptoms after starting methotrexate and prednisone.  Left message for patient.

## 2017-04-14 ENCOUNTER — OFFICE VISIT (OUTPATIENT)
Dept: RHEUMATOLOGY | Facility: PHYSICIAN GROUP | Age: 47
End: 2017-04-14

## 2017-04-14 VITALS
HEART RATE: 106 BPM | RESPIRATION RATE: 12 BRPM | TEMPERATURE: 97.4 F | SYSTOLIC BLOOD PRESSURE: 108 MMHG | OXYGEN SATURATION: 95 % | DIASTOLIC BLOOD PRESSURE: 90 MMHG | WEIGHT: 158 LBS | BODY MASS INDEX: 28 KG/M2

## 2017-04-14 DIAGNOSIS — M06.00 SERONEGATIVE RHEUMATOID ARTHRITIS (HCC): ICD-10-CM

## 2017-04-14 PROCEDURE — 99214 OFFICE O/P EST MOD 30 MIN: CPT | Performed by: INTERNAL MEDICINE

## 2017-04-14 ASSESSMENT — ENCOUNTER SYMPTOMS
HEMOPTYSIS: 0
ABDOMINAL PAIN: 0
COUGH: 0
CHILLS: 0

## 2017-04-14 NOTE — MR AVS SNAPSHOT
"Enedelia Pang   2017 7:00 AM   Office Visit   MRN: [id number]    Department:  Rheumatology [facility]   Dept Phone:  [phone]    Description:  Female : 1970   Provider:  Aimee Andrade M.D.           Reason for Visit     Follow-Up follow up      Allergies as of 2017     Allergen Noted Reactions    Prochlorperazine 2015   Swelling    Tongue swelling. Reaction as a teen. (compazine)    Sumatriptan 2015   Unspecified    Historical=\"Heart stops.\" Reaction  between  to     Vancomycin 2015   Shortness of Breath, Rash    Reaction in .  D/W patient 8/31/15 - tolerated loading dose of vancomycin administered on 8/30/15 with some itching to chest with decreased infusion rate. Red Man Syndrome      You were diagnosed with     Seronegative rheumatoid arthritis (CMS-HCC)   [110239]         Vital Signs     Blood Pressure Pulse Temperature Respirations Weight Oxygen Saturation    108/90 mmHg 106 36.3 °C (97.4 °F) 12 71.668 kg (158 lb) 95%    Breastfeeding? Smoking Status                No Never Smoker           Basic Information     Date Of Birth Sex Race Ethnicity Preferred Language    1970 Female White Non- English      Your appointments     Martin 15, 2017  2:00 PM   Follow Up Visit with Aimee Andrade M.D.   [facility] (--)    [address] [place] [address] [id number] [phone]           You will be receiving a confirmation call a few days before your appointment from our automated call confirmation system.              Problem List              ICD-10-CM Priority Class Noted - Resolved    Paresthesia of right arm R20.2   10/9/2014 - Present    Intestinal infection due to Clostridium difficile A04.7 Medium  10/10/2014 - Present    R Hemiparesis G81.90 High  10/29/2014 - Present    Complicated migraine G43.109 Medium  10/29/2014 - Present    Osteomyelitis of finger of left hand (CMS-[facility]) M86.9 Medium  10/30/2014 - Present    Elevated ALT R74.0 "   11/4/2014 - Present    Intractable migraine G43.919 Medium  3/21/2015 - Present    Low blood pressure reading R03.1   3/22/2015 - Present    Dermatitis- chronic L30.9   3/24/2015 - Present    Wounds, multiple T07   4/23/2015 - Present    Hydrocephalus G91.9   4/23/2015 - Present    Back pain M54.9 High  6/28/2015 - Present    SIRS (systemic inflammatory response syndrome) (CMS-HCC) R65.10   6/28/2015 - Present    S/P  shunt Z98.2   6/29/2015 - Present    Anxiety F41.9   6/29/2015 - Present    Depression F32.9   6/29/2015 - Present    Headache R51   7/3/2015 - Present    Abdominal pain R10.9   7/3/2015 - Present    Hypokalemia E87.6   8/30/2015 - Present    Lactic acidosis E87.2   8/30/2015 - Present    Hypomagnesemia E83.42   8/30/2015 - Present    Leukocytosis D72.829   8/30/2015 - Present    Sepsis (CMS-HCC) A41.9   8/30/2015 - Present    Intractable low back pain M54.5   8/30/2015 - Present    Hypocalcemia E83.51   8/30/2015 - Present    Septic shock (CMS-HCC) A41.9, R65.21 High  8/31/2015 - Present    Normocytic anemia D64.9   8/31/2015 - Present    Thrombocytopenia (CMS-HCC) D69.6   8/31/2015 - Present    Infected venous access port T80.219A   10/3/2015 - Present    Migraine G43.909   10/4/2015 - Present    Chronic pain G89.29 High  10/4/2015 - Present    Bacteremia R78.81   10/4/2015 - Present    Chronic pain syndrome G89.4 Low  12/15/2016 - Present    Chronic pain disorder G89.4   12/19/2016 - Present    Swelling of joint of hand M25.449 High  1/6/2017 - Present    Seronegative rheumatoid arthritis (CMS-HCC) M06.00   1/17/2017 - Present      Health Maintenance        Date Due Completion Dates    IMM DTaP/Tdap/Td Vaccine (1 - Tdap) 8/16/1991 ---    PAP SMEAR 8/16/1993 ---    MAMMOGRAM 8/16/2012 ---            Current Immunizations     Influenza Vaccine Quad Inj (Pf) 1/11/2017  8:57 AM, 10/9/2014  9:45 AM    Influenza Vaccine Quad Inj (Preserved) 10/3/2015  4:06 PM      Below and/or attached are the  medications your provider expects you to take. Review all of your home medications and newly ordered medications with your provider and/or pharmacist. Follow medication instructions as directed by your provider and/or pharmacist. Please keep your medication list with you and share with your provider. Update the information when medications are discontinued, doses are changed, or new medications (including over-the-counter products) are added; and carry medication information at all times in the event of emergency situations     Allergies:  PROCHLORPERAZINE - Swelling     SUMATRIPTAN - Unspecified     VANCOMYCIN - Shortness of Breath,Rash               Medications  Valid as of: April 14, 2017 -  8:00 AM    Generic Name Brand Name Tablet Size Instructions for use    AcetaZOLAMIDE (CAPSULE SR 12 HR) DIAMOX 500 MG Take 500 mg by mouth 2 times a day.        Cephalexin (Cap) KEFLEX 500 MG Take 500 mg by mouth 4 times a day. 5 day course, finished 12/24/16        Cholecalciferol (Tab) VITAMIND D3 1000 UNIT Take 1 Tab by mouth every day.        Cyanocobalamin   Take 1 Tab by mouth every day.        Cyclobenzaprine HCl (Tab) FLEXERIL 10 MG Take 1 Tab by mouth 3 times a day as needed for Muscle Spasms.        DiphenhydrAMINE HCl (Tab) BENADRYL 50 MG Take 1 Tab by mouth every 6 hours as needed (Headache, give with toradol).        DiphenhydrAMINE HCl (Solution) BENADRYL 50 MG/ML INJECT IM EVERY 6 HOURS AS NEEDED FOR ACUTE, SEVERE MIGRAINE HEADACHE AS DIRECTED        DULoxetine HCl (Cap DR Particles) CYMBALTA 60 MG Take 60 mg by mouth 2 times a day.        Ferrous Gluconate (Tab) FERGON 324 (38 FE) MG Take 1 Tab by mouth 2 times a day, with meals.        Feverfew   Take  by mouth.        Folic Acid (Tab) FOLVITE 1 MG Take 1 Tab by mouth every day.        Gabapentin (Tab) NEURONTIN 600 MG Take 600 mg by mouth 3 times a day.        Ketorolac Tromethamine (Solution) TORADOL 60 MG/2ML 60 mg by Intramuscular route Once PRN  (migraine).        Magnesium Oxide   Take 1 Tab by mouth every day.        Methotrexate Sodium (Tab) methotrexate 2.5 MG Take 5 Tabs by mouth every 7 days.        Multiple Vitamins-Minerals   Take 1 Tab by mouth every day.        Omeprazole (CAPSULE DELAYED RELEASE) PRILOSEC 20 MG Take 1 Cap by mouth every day.        OxyCODONE HCl (Tab) ROXICODONE 10 MG Take 1 Tab by mouth every four hours as needed for Moderate Pain.        Oyster Shell (Tab) OS-ZAIN 500 500 MG Take 1 Tab by mouth 2 times a day, with meals.        PredniSONE (Tab) DELTASONE 5 MG 30 mg po q day x 7 days decrease by 5 mg every week until 10 mg po q day then stay at this dose until seen.        SulfaSALAzine (Tab) AZULFIDINE 500 MG Take 2 Tabs by mouth 2 times a day.        .                 Medicines prescribed today were sent to:     [facility] [place] [facility] [address]    [address] [place] [address]    Phone: [phone] Fax: [phone]    Open 24 Hours?: No      Medication refill instructions:       If your prescription bottle indicates you have medication refills left, it is not necessary to call your provider’s office. Please contact your pharmacy and they will refill your medication.    If your prescription bottle indicates you do not have any refills left, you may request refills at any time through one of the following ways: The online [employer] system (except Urgent Care), by calling your provider’s office, or by asking your pharmacy to contact your provider’s office with a refill request. Medication refills are processed only during regular business hours and may not be available until the next business day. Your provider may request additional information or to have a follow-up visit with you prior to refilling your medication.   *Please Note: Medication refills are assigned a new Rx number when refilled electronically. Your pharmacy may indicate that no refills were authorized even though a new prescription for the same medication  is available at the pharmacy. Please request the medicine by name with the pharmacy before contacting your provider for a refill.        Your To Do List     Future Labs/Procedures Complete By Expires    CRP QUANTITIVE (NON-CARDIAC)  4/26/2017 4/14/2018    CBC WITH DIFFERENTIAL  As directed 4/14/2018    CCP  As directed 4/14/2018    COMP METABOLIC PANEL  As directed 4/14/2018    CRYOGLOBULIN QL SERUM RFLX  As directed 4/14/2018    RHEUMATOID ARTHRITIS FACTOR  As directed 4/14/2018    WESTERGREN SED RATE  As directed 4/14/2018      Instructions    Increase folic acid          MyChart Access Code: Activation code not generated  Current [employer] Status: Active

## 2017-04-14 NOTE — PROGRESS NOTES
Subjective:   Date of Consultation:4/14/2017  7:11 AM  Primary care physician:Elliott Power M.D.    Reason for Consultation:  Ms. Pang  is a pleasant 46 y.o. year old female who presented with follow-up seronegative Rheumatoid arthritis    Seronegative Rheumatoid Arthritis, non erosive  At last visit we started methotrexate 5 tabs weekly and increased sulfasalazine to 1000 mg BID.  She notes improvement when she was on prednisone 30 mg po q day and as she tapered to 20 mg po q day she started having symptoms.  While on prednisone 30 mg she did not have symptoms or rashes.  At 15 mg she started having weekly rashes.    She had an EMG in January which she reports was normal.      Medications Compliance / Problems: compliant; she does note hair loss. She is not sure if it is related to methotrexate but is noting more hair loss.    Current Medications:  Sulfasalazine 1000 mg BID  Methotrexate 5 tabs weekly q Thursday  Folic acid    RHEUM HISTORY:  Ms. Enedelia Pang presented in January 2016 with joint pain and swelling of hands,knees, and weakness and a 3 month history of septal perforation.  ENT initial evaluation noted no disease activity.  She also has a history of abnormal LFT with unclear etiology.  She has had a work-up with GI in early 2017 which was unrevealing.  Also she has had a recurrent rash described as papules that ulcerate identified as MRSA infections.  Her labs did show thrombocytosis which could be inflammatory and anemia which was determined to be iron deficiency anemia.  Her rheumatologic work-up showed RF(-), MG (-), ANCA (-), SCL 70 (-) and anti centromere (-)  Parvovirus and rubella IgM was negative    Hand xrays did show periarticular osteopenia.    1/9/2017  Hepatitis B surface antigen negative  Hepatitis B core antibody negative  Hepatitis C antibody negative  quantiferon negative   HIV negative     CXR 12/6/2017 was negative for edema     11/2016 Nasal biopsy did not show active  disease    Elevated liver enzymes  Notes from GI recommend close monitoring of LFT if methotrexate is used 1/17/2017    ENT  Nose biopsy done yesterday  She did follow-up with ENT and noted that her septal perforation has tripled  Concern of vasculitis    Iron deficiency anemia  On review of records evein June 2016 her hemoglobin was 8.6 MCV was 74.7    Labs from 12/5/2016 shows Hgb 14.1 and MCV 98 and ALT 16 and AST 12    History of retinal disease  She did see ophthalmology but appointment was but short    Past Medical History:  Pseudotumor cerebri, MRSA infection history, CVA, miraines, pituatary tumor, migraine headaches, septal perforation    Past Medical History   Diagnosis Date   • Migraine with aura, with intractable migraine, so stated, without mention of status migrainosus    • H/O Clostridium difficile infection    • Hydrocephalus      with lumbar shunt   • Hx MRSA infection    • Port-a-cath in place    • Pituitary abnormality (CMS-HCC)    • Allergy, unspecified not elsewhere classified    • Anxiety    • Depression    • Stroke (CMS-HCC)      2007   • Cold    • Snoring    • Pain      Past Surgical History   Procedure Laterality Date   • Cholecystectomy     • Tonsillectomy     • Appendectomy     • Tubal ligation     • Other       right knee surgery   • Other neurological surg       occipital nerve stimulator   • Irrigation & debridement neuro N/A 6/28/2015     Procedure: IRRIGATION & DEBRIDEMENT NEURO;  Surgeon: Oli Oh III, M.D.;  Location: SURGERY [facility];  Service:    • Lumbar exploration N/A 8/31/2015     Procedure: LUMBAR EXPLORATION- Lumbar shunt removal ;  Surgeon: Oli Oh III, M.D.;  Location: SURGERY [facility];  Service:    • Spinal cord stimulator  12/19/2016     Procedure: SPINAL CORD STIMULATOR FOR: PLACEMENT OF LEFT FLANK OCCIPITAL NERVE STIMULATOR BATTERY PACK;  Surgeon: Oli Oh III, M.D.;  Location: SURGERY [facility];  Service:      Allergies  "  Allergen Reactions   • Prochlorperazine Swelling     Tongue swelling. Reaction as a teen. (compazine)   • Sumatriptan Unspecified     Historical=\"Heart stops.\" Reaction  between 1995 to 1997   • Vancomycin Shortness of Breath and Rash     Reaction in 2005.  D/W patient 8/31/15 - tolerated loading dose of vancomycin administered on 8/30/15 with some itching to chest with decreased infusion rate. Red Man Syndrome     Outpatient Encounter Prescriptions as of 4/14/2017   Medication Sig Dispense Refill   • methotrexate 2.5 MG Tab Take 5 Tabs by mouth every 7 days. 20 Tab 1   • folic acid (FOLVITE) 1 MG Tab Take 1 Tab by mouth every day. 30 Tab 11   • sulfaSALAzine (AZULFIDINE) 500 MG Tab Take 2 Tabs by mouth 2 times a day. 120 Tab 3   • predniSONE (DELTASONE) 5 MG Tab 30 mg po q day x 7 days decrease by 5 mg every week until 10 mg po q day then stay at this dose until seen. 150 Tab 0   • ferrous gluconate (FERGON) 324 (38 FE) MG Tab Take 1 Tab by mouth 2 times a day, with meals. 60 Tab 1   • calcium carbonate (CALCIUM CARBONATE) 500 MG Tab Take 1 Tab by mouth 2 times a day, with meals. 90 Tab 1   • omeprazole (PRILOSEC) 20 MG delayed-release capsule Take 1 Cap by mouth every day. 30 Cap 2   • vitamin D (VITAMIND D3) 1000 UNIT Tab Take 1 Tab by mouth every day. 60 Tab    • gabapentin (NEURONTIN) 600 MG tablet Take 600 mg by mouth 3 times a day.     • MAGNESIUM OXIDE PO Take 1 Tab by mouth every day.     • acetaZOLAMIDE SR (DIAMOX) 500 MG CAPSULE SR 12 HR Take 500 mg by mouth 2 times a day.     • Multiple Vitamins-Minerals (MULTIVITAMIN ADULT PO) Take 1 Tab by mouth every day.     • diphenhydrAMINE (BENADRYL) 50 MG tablet Take 1 Tab by mouth every 6 hours as needed (Headache, give with toradol). 30 Tab 0   • duloxetine (CYMBALTA) 60 MG CPEP delayed-release capsule Take 60 mg by mouth 2 times a day.     • FEVERFEW PO Take  by mouth.     • cyclobenzaprine (FLEXERIL) 10 MG Tab Take 1 Tab by mouth 3 times a day as needed for " Muscle Spasms. 30 Tab 0   • ketorolac (TORADOL) 60 MG/2ML Solution 60 mg by Intramuscular route Once PRN (migraine).     • cephALEXin (KEFLEX) 500 MG Cap Take 500 mg by mouth 4 times a day. 5 day course, finished 12/24/16     • oxycodone immediate release (ROXICODONE) 10 MG immediate release tablet Take 1 Tab by mouth every four hours as needed for Moderate Pain. 100 Tab 0   • Cyanocobalamin (VITAMIN B 12 PO) Take 1 Tab by mouth every day.     • diphenhydrAMINE (BENADRYL) 50 MG/ML Solution INJECT IM EVERY 6 HOURS AS NEEDED FOR ACUTE, SEVERE MIGRAINE HEADACHE AS DIRECTED 25 mL 5     No facility-administered encounter medications on file as of 4/14/2017.       Social History     Social History   • Marital Status:      Spouse Name: N/A   • Number of Children: N/A   • Years of Education: N/A     Occupational History   • Not on file.     Social History Main Topics   • Smoking status: Never Smoker    • Smokeless tobacco: Never Used   • Alcohol Use: No   • Drug Use: No   • Sexual Activity: Not on file     Other Topics Concern   • Not on file     Social History Narrative      History   Smoking status   • Never Smoker    Smokeless tobacco   • Never Used     History   Alcohol Use No     History   Drug Use No      OB History   No data available      No LMP recorded.    [facility]     No family history on file.    Review of Systems   Constitutional: Negative for chills.   Respiratory: Negative for cough and hemoptysis.    Gastrointestinal: Negative for abdominal pain.   Skin: Positive for rash.        Intermittent rashes  Old scars are appearing more hyperpigmented.  She is reporting hair loss        Objective:   /90 mmHg  Pulse 106  Temp(Src) 36.3 °C (97.4 °F)  Resp 12  Wt 71.668 kg (158 lb)  SpO2 95%  Breastfeeding? No    Physical Exam   Constitutional: She is oriented to person, place, and time. She appears well-developed and well-nourished. No distress.   HENT:   Head: Normocephalic and atraumatic.   Right  Ear: External ear normal.   Left Ear: External ear normal.   Eyes: Conjunctivae are normal. Pupils are equal, round, and reactive to light. Right eye exhibits no discharge. Left eye exhibits no discharge. No scleral icterus.   Cardiovascular: Normal rate, regular rhythm and normal heart sounds.    Pulmonary/Chest: Effort normal. No stridor. No respiratory distress. She has no wheezes. She has no rales.   Abdominal: Soft.   No hepatosplenomegaly   Musculoskeletal: She exhibits no edema.   Good ROM of her wrists bilateral.  Good ROM flexion and extension of elbows bilateral. No tenderness or periarticular swelling at the MCP.  Swelling and tenderness at the DIP.   Neurological: She is alert and oriented to person, place, and time.   Skin: Skin is warm and dry. She is not diaphoretic.   Old lesions of her rash.  No new lesions appreciated.  The 3rd digit at the dip has mild bruising on dorsal aspect.  No alopecia   Psychiatric: She has a normal mood and affect. Her behavior is normal. Judgment and thought content normal.       Assessment:     1. Seronegative rheumatoid arthritis (CMS-HCC)  CCP    CBC WITH DIFFERENTIAL    COMP METABOLIC PANEL    CRP QUANTITIVE (NON-CARDIAC)    WESTERGREN SED RATE    RHEUMATOID ARTHRITIS FACTOR    CRYOGLOBULIN QL SERUM RFLX     Labs:    Lab Results   Component Value Date/Time    QUANTIFERON TB GOLD Negative 01/09/2017 06:01 PM     Lab Results   Component Value Date/Time    HEPATITIS B CORS AB,IGM Negative 01/09/2017 06:00 PM    HEPATITIS B SURFACE ANTIGEN Negative 01/09/2017 06:00 PM     Lab Results   Component Value Date/Time    HEPATITIS C ANTIBODY Negative 01/09/2017 06:00 PM     Lab Results   Component Value Date/Time    SODIUM 139 01/11/2017 02:29 PM    POTASSIUM 3.6 01/11/2017 02:29 PM    CHLORIDE 106 01/11/2017 02:29 PM    CO2 22 01/11/2017 02:29 PM    GLUCOSE 100* 01/11/2017 02:29 PM    BUN 16 01/11/2017 02:29 PM    CREATININE 0.74 01/11/2017 02:29 PM      Lab Results   Component  Value Date/Time    WBC 12.5* 01/11/2017 02:29 PM    RBC 5.12 01/11/2017 02:29 PM    HEMOGLOBIN 10.9* 01/11/2017 02:29 PM    HEMATOCRIT 38.4 01/11/2017 02:29 PM    MCV 75.0* 01/11/2017 02:29 PM    MCH 21.3* 01/11/2017 02:29 PM    MCHC 28.4* 01/11/2017 02:29 PM    MPV 9.6 01/11/2017 02:29 PM    NEUTROPHILS-POLYS 85.10* 01/11/2017 02:29 PM    LYMPHOCYTES 7.00* 01/11/2017 02:29 PM    MONOCYTES 1.80 01/11/2017 02:29 PM    EOSINOPHILS 0.00 01/11/2017 02:29 PM    BASOPHILS 2.60* 01/11/2017 02:29 PM    HYPOCHROMIA 1+ 01/11/2017 02:29 PM    ANISOCYTOSIS 1+ 01/11/2017 02:29 PM      Lab Results   Component Value Date/Time    CALCIUM 9.8 01/11/2017 02:29 PM    AST(SGOT) 15 01/11/2017 02:29 PM    ALT(SGPT) 23 01/11/2017 02:29 PM    ALKALINE PHOSPHATASE 122* 01/11/2017 02:29 PM    TOTAL BILIRUBIN 0.2 01/11/2017 02:29 PM    ALBUMIN 4.5 01/11/2017 02:29 PM    TOTAL PROTEIN 8.2 01/11/2017 02:29 PM     Lab Results   Component Value Date/Time    RHEUMATOID FACTOR -NEPH- <10 01/06/2017 04:12 AM    ANTINUCLEAR ANTIBODY None Detected 01/06/2017 04:12 AM     Lab Results   Component Value Date/Time    SED RATE WESTERGREN 66* 01/05/2017 11:22 PM    STAT C-REACTIVE PROTEIN 7.92* 01/05/2017 11:22 PM     No results found for: LETA BENITEZ  Lab Results   Component Value Date/Time    C3 COMPLEMENT 178.0 01/06/2017 04:12 AM    COMPLEMENT C4 37.0 01/06/2017 04:12 AM     Lab Results   Component Value Date/Time    ANTI-SCL-70 0 01/06/2017 04:12 AM    ANTI-CENTROMERE B AB 2 01/06/2017 04:12 AM     Lab Results   Component Value Date/Time    ANCA IGG <1:20 01/06/2017 04:12 AM    C3 COMPLEMENT 178.0 01/06/2017 04:12 AM     Lab Results   Component Value Date/Time    COLOR Colorless 01/06/2017 12:30 PM    SPECIFIC GRAVITY 1.006 01/06/2017 12:30 PM    PH 7.5 01/06/2017 12:30 PM    GLUCOSE Negative 01/06/2017 12:30 PM    KETONES Negative 01/06/2017 12:30 PM    PROTEIN Negative 01/06/2017 12:30 PM     No results found for: TOTPROTUR, TOTALVOLUME,  [id number]  No results found for: SSA60, SSA52  No results found for: HBA1C  Lab Results   Component Value Date/Time    CPK TOTAL 40 01/06/2017 04:12 AM     Lab Results   Component Value Date/Time    G-6-PD 20.4* 01/10/2017 01:54 PM     No results found for: XFFT60DTKG  No results found for: ACESERUM  Lab Results   Component Value Date/Time    25-HYDROXY   VITAMIN D 25 45 01/09/2017 06:00 PM     No results found for: TSH, FREEDIR  Lab Results   Component Value Date/Time    TSH 0.740 01/05/2017 11:22 PM    FREE T-4 0.76 01/05/2017 11:22 PM     No results found for: MICROSOMALA, ANTITHYROGL  No results found for: IGGLYMEABS  No results found for: ANTIMITOCHO, FACTIN  No results found for: IGA, TTRANSIGA, ENDOIGA  No results found for: FLTYPE, CRYSTALSBDF  No results found for: ISTATICAL  No results found for: ISTATCREAT  No results found for: CTELOPEP  No results found for: GBMABG  No results found for: PTHINTACT  1/27/2015 hepatitis E IgM    1/27/2015 ceruloplasmin = 29.7  1/27/2015 smith antibody = 9 (normal)  1/27/2015 MG negative  1/27/2015 alpha antitrypsin 119 ()  1/27/2015 ferritin = 75 (normal)  1/27/2015 GGT = 327 (0-55)    1/27/2015 IgG 4 = 11  10/31/2016 and 1/27/2015  p ANCA (-), c ANCA (-), PR3 (-)  10/31/2016 MPO (-)  12/7/2014 AST = 23 ALT = 137 Alk phos = 205  6/10/2016 ALT = 921 AST = 821 Alk phos = 537  Labs from JUne 2016 shows low albumin 2.7 and AST 92 and ALT of 370    6/11/2016; AST=92, ALT  = 370 Alk Phos = 331 Hgb = 8.6 MCV = 74.7 t bili = 1.2  10/15/2015: Hgb = 9.1 Plt =   ALT= 357 AST =127 Alk phos = 469 creatinine = 0.7    US LE venous doppler bilateral 6/30/2014  Probable baker's cyst on left lower extremity    Left hand XR 8/14/2014  Soft tissue swelling over the dorsum.  No erorsons no fractures     CT abdomen/Pelvis w/o Contrast 9/4/2014  Small amount of fluid in cul-de-sac no abnormalities in liver.  Abdominal aorta was normal.  Adrenal glands normal.  coritical medullatry  calcifications noted of left kidney    CT head w w/o contrast 10/15/2016  No acute abnormalities and on CT maxillofacial w/ contrast - no evidence of sinusitis    CTchest w/con 1/12/2016  No findings to suggest aortic dissection or pulmonary embolus  Left renal 18 mm cyst in the ventral cortex medially    CT spine lumbar w/o contrast  8/30/2015  Small fluid  Collection posterior junction of the epidural catheter seen right lateral to the L2 posterior spinous process consistent with leakage and/or infection      Medical Decision Making:  Today's Assessment / Status / Plan:     Seronegative rheumatoid arthritis with involvement of the small joints of the hands and wrists and periarticular osteopenia on xray  She is on prednisone 15 mg and noted mild tenderness at the DIP.  We will obtain labs and consider increasing methotrexate.    We will also recheck RF today and cryoglobulins.  We will also check CCP    Septal perforation  ANCA serologies are negative x 3  MG Is negative  Biopsy results did not show active disease  CXR did not note hilar adenopathy  She did have an episode of epistaxis and I have advised her to follow-up with ENT.    Abnormal LFT  Will monitor closely    1. Seronegative rheumatoid arthritis (CMS-HCC)  CCP    CBC WITH DIFFERENTIAL    COMP METABOLIC PANEL    CRP QUANTITIVE (NON-CARDIAC)    WESTERGREN SED RATE    RHEUMATOID ARTHRITIS FACTOR    CRYOGLOBULIN QL SERUM RFLX         Return in about 2 months (around 6/14/2017).    I have spent greater than 50% of this 30 minute visit in counseling/coordination of care with the patient regarding review of the differential and importance of tissue diagnosis to identify etiology of her septal perforation.    She agreed and verbalized her agreement and understanding with the current plan. I answered all questions and concerns she has at this time and advised her to call at any time in there interim with questions or concerns in regards to her care.    Thank  you for allowing me to participate in her care, I will continue to follow closely.

## 2017-04-14 NOTE — Clinical Note
[facility]   [address], [address]  [place] [address]  Phone: [phone]  Fax: [phone]              Encounter Date: 4/14/2017    Dear Dr. Power,    It was a pleasure seeing your patient, Enedelia Pang, on 4/14/2017. The encounter diagnosis was Seronegative rheumatoid arthritis (CMS-Formerly [facility]).     Please find attached progress note which includes the history I obtained from Ms. Pang, my physical examination findings, my impression and recommendations.      Once again, it was a pleasure participating in your patient's care.  Please feel free to contact me if you have any questions or if I can be of any further assistance to your patients.      Sincerely,    Aimee Andrade M.D.  Electronically Signed          PROGRESS NOTE:  Subjective:   Date of Consultation:4/14/2017  7:11 AM  Primary care physician:Elliott Power M.D.    Reason for Consultation:  Ms. Pang  is a pleasant 46 y.o. year old female who presented with follow-up seronegative Rheumatoid arthritis    Seronegative Rheumatoid Arthritis, non erosive  At last visit we started methotrexate 5 tabs weekly and increased sulfasalazine to 1000 mg BID.  She notes improvement when she was on prednisone 30 mg po q day and as she tapered to 20 mg po q day she started having symptoms.  While on prednisone 30 mg she did not have symptoms or rashes.  At 15 mg she started having weekly rashes.    She had an EMG in January which she reports was normal.      Medications Compliance / Problems: compliant; she does note hair loss. She is not sure if it is related to methotrexate but is noting more hair loss.    Current Medications:  Sulfasalazine 1000 mg BID  Methotrexate 5 tabs weekly q Thursday  Folic acid    RHEUM HISTORY:  Ms. Enedelia Pang presented in January 2016 with joint pain and swelling of hands,knees, and weakness and a 3 month history of septal perforation.  ENT initial evaluation noted no disease activity.  She also  has a history of abnormal LFT with unclear etiology.  She has had a work-up with GI in early 2017 which was unrevealing.  Also she has had a recurrent rash described as papules that ulcerate identified as MRSA infections.  Her labs did show thrombocytosis which could be inflammatory and anemia which was determined to be iron deficiency anemia.  Her rheumatologic work-up showed RF(-), MG (-), ANCA (-), SCL 70 (-) and anti centromere (-)  Parvovirus and rubella IgM was negative    Hand xrays did show periarticular osteopenia.    1/9/2017  Hepatitis B surface antigen negative  Hepatitis B core antibody negative  Hepatitis C antibody negative  quantiferon negative   HIV negative     CXR 12/6/2017 was negative for edema     11/2016 Nasal biopsy did not show active disease    Elevated liver enzymes  Notes from GI recommend close monitoring of LFT if methotrexate is used 1/17/2017    ENT  Nose biopsy done yesterday  She did follow-up with ENT and noted that her septal perforation has tripled  Concern of vasculitis    Iron deficiency anemia  On review of records evein June 2016 her hemoglobin was 8.6 MCV was 74.7    Labs from 12/5/2016 shows Hgb 14.1 and MCV 98 and ALT 16 and AST 12    History of retinal disease  She did see ophthalmology but appointment was but short    Past Medical History:  Pseudotumor cerebri, MRSA infection history, CVA, miraines, pituatary tumor, migraine headaches, septal perforation    Past Medical History   Diagnosis Date   • Migraine with aura, with intractable migraine, so stated, without mention of status migrainosus    • H/O Clostridium difficile infection    • Hydrocephalus      with lumbar shunt   • Hx MRSA infection    • Port-a-cath in place    • Pituitary abnormality (CMS-HCC)    • Allergy, unspecified not elsewhere classified    • Anxiety    • Depression    • Stroke (CMS-HCC)      2007   • Cold    • Snoring    • Pain      Past Surgical History   Procedure Laterality Date   •  "Cholecystectomy     • Tonsillectomy     • Appendectomy     • Tubal ligation     • Other       right knee surgery   • Other neurological surg       occipital nerve stimulator   • Irrigation & debridement neuro N/A 6/28/2015     Procedure: IRRIGATION & DEBRIDEMENT NEURO;  Surgeon: Oli Oh III, M.D.;  Location: SURGERY [facility];  Service:    • Lumbar exploration N/A 8/31/2015     Procedure: LUMBAR EXPLORATION- Lumbar shunt removal ;  Surgeon: Oli Oh III, M.D.;  Location: SURGERY [facility];  Service:    • Spinal cord stimulator  12/19/2016     Procedure: SPINAL CORD STIMULATOR FOR: PLACEMENT OF LEFT FLANK OCCIPITAL NERVE STIMULATOR BATTERY PACK;  Surgeon: Oli Oh III, M.D.;  Location: SURGERY [facility];  Service:      Allergies   Allergen Reactions   • Prochlorperazine Swelling     Tongue swelling. Reaction as a teen. (compazine)   • Sumatriptan Unspecified     Historical=\"Heart stops.\" Reaction  between 1995 to 1997   • Vancomycin Shortness of Breath and Rash     Reaction in 2005.  D/W patient 8/31/15 - tolerated loading dose of vancomycin administered on 8/30/15 with some itching to chest with decreased infusion rate. Red Man Syndrome     Outpatient Encounter Prescriptions as of 4/14/2017   Medication Sig Dispense Refill   • methotrexate 2.5 MG Tab Take 5 Tabs by mouth every 7 days. 20 Tab 1   • folic acid (FOLVITE) 1 MG Tab Take 1 Tab by mouth every day. 30 Tab 11   • sulfaSALAzine (AZULFIDINE) 500 MG Tab Take 2 Tabs by mouth 2 times a day. 120 Tab 3   • predniSONE (DELTASONE) 5 MG Tab 30 mg po q day x 7 days decrease by 5 mg every week until 10 mg po q day then stay at this dose until seen. 150 Tab 0   • ferrous gluconate (FERGON) 324 (38 FE) MG Tab Take 1 Tab by mouth 2 times a day, with meals. 60 Tab 1   • calcium carbonate (CALCIUM CARBONATE) 500 MG Tab Take 1 Tab by mouth 2 times a day, with meals. 90 Tab 1   • omeprazole (PRILOSEC) 20 MG delayed-release capsule Take 1 " Cap by mouth every day. 30 Cap 2   • vitamin D (VITAMIND D3) 1000 UNIT Tab Take 1 Tab by mouth every day. 60 Tab    • gabapentin (NEURONTIN) 600 MG tablet Take 600 mg by mouth 3 times a day.     • MAGNESIUM OXIDE PO Take 1 Tab by mouth every day.     • acetaZOLAMIDE SR (DIAMOX) 500 MG CAPSULE SR 12 HR Take 500 mg by mouth 2 times a day.     • Multiple Vitamins-Minerals (MULTIVITAMIN ADULT PO) Take 1 Tab by mouth every day.     • diphenhydrAMINE (BENADRYL) 50 MG tablet Take 1 Tab by mouth every 6 hours as needed (Headache, give with toradol). 30 Tab 0   • duloxetine (CYMBALTA) 60 MG CPEP delayed-release capsule Take 60 mg by mouth 2 times a day.     • FEVERFEW PO Take  by mouth.     • cyclobenzaprine (FLEXERIL) 10 MG Tab Take 1 Tab by mouth 3 times a day as needed for Muscle Spasms. 30 Tab 0   • ketorolac (TORADOL) 60 MG/2ML Solution 60 mg by Intramuscular route Once PRN (migraine).     • cephALEXin (KEFLEX) 500 MG Cap Take 500 mg by mouth 4 times a day. 5 day course, finished 12/24/16     • oxycodone immediate release (ROXICODONE) 10 MG immediate release tablet Take 1 Tab by mouth every four hours as needed for Moderate Pain. 100 Tab 0   • Cyanocobalamin (VITAMIN B 12 PO) Take 1 Tab by mouth every day.     • diphenhydrAMINE (BENADRYL) 50 MG/ML Solution INJECT IM EVERY 6 HOURS AS NEEDED FOR ACUTE, SEVERE MIGRAINE HEADACHE AS DIRECTED 25 mL 5     No facility-administered encounter medications on file as of 4/14/2017.       Social History     Social History   • Marital Status:      Spouse Name: N/A   • Number of Children: N/A   • Years of Education: N/A     Occupational History   • Not on file.     Social History Main Topics   • Smoking status: Never Smoker    • Smokeless tobacco: Never Used   • Alcohol Use: No   • Drug Use: No   • Sexual Activity: Not on file     Other Topics Concern   • Not on file     Social History Narrative      History   Smoking status   • Never Smoker    Smokeless tobacco   • Never Used        History   Alcohol Use No     History   Drug Use No      OB History   No data available      No LMP recorded.    [facility]     No family history on file.    Review of Systems   Constitutional: Negative for chills.   Respiratory: Negative for cough and hemoptysis.    Gastrointestinal: Negative for abdominal pain.   Skin: Positive for rash.        Intermittent rashes  Old scars are appearing more hyperpigmented.  She is reporting hair loss        Objective:   /90 mmHg  Pulse 106  Temp(Src) 36.3 °C (97.4 °F)  Resp 12  Wt 71.668 kg (158 lb)  SpO2 95%  Breastfeeding? No    Physical Exam   Constitutional: She is oriented to person, place, and time. She appears well-developed and well-nourished. No distress.   HENT:   Head: Normocephalic and atraumatic.   Right Ear: External ear normal.   Left Ear: External ear normal.   Eyes: Conjunctivae are normal. Pupils are equal, round, and reactive to light. Right eye exhibits no discharge. Left eye exhibits no discharge. No scleral icterus.   Cardiovascular: Normal rate, regular rhythm and normal heart sounds.    Pulmonary/Chest: Effort normal. No stridor. No respiratory distress. She has no wheezes. She has no rales.   Abdominal: Soft.   No hepatosplenomegaly   Musculoskeletal: She exhibits no edema.   Good ROM of her wrists bilateral.  Good ROM flexion and extension of elbows bilateral. No tenderness or periarticular swelling at the MCP.  Swelling and tenderness at the DIP.   Neurological: She is alert and oriented to person, place, and time.   Skin: Skin is warm and dry. She is not diaphoretic.   Old lesions of her rash.  No new lesions appreciated.  The 3rd digit at the dip has mild bruising on dorsal aspect.  No alopecia   Psychiatric: She has a normal mood and affect. Her behavior is normal. Judgment and thought content normal.       Assessment:     1. Seronegative rheumatoid arthritis (CMS-HCC)  CCP    CBC WITH DIFFERENTIAL    COMP METABOLIC PANEL    CRP  QUANTITIVE (NON-CARDIAC)    WESTERGREN SED RATE    RHEUMATOID ARTHRITIS FACTOR    CRYOGLOBULIN QL SERUM RFLX     Labs:    Lab Results   Component Value Date/Time    QUANTIFERON TB GOLD Negative 01/09/2017 06:01 PM     Lab Results   Component Value Date/Time    HEPATITIS B CORS AB,IGM Negative 01/09/2017 06:00 PM    HEPATITIS B SURFACE ANTIGEN Negative 01/09/2017 06:00 PM     Lab Results   Component Value Date/Time    HEPATITIS C ANTIBODY Negative 01/09/2017 06:00 PM     Lab Results   Component Value Date/Time    SODIUM 139 01/11/2017 02:29 PM    POTASSIUM 3.6 01/11/2017 02:29 PM    CHLORIDE 106 01/11/2017 02:29 PM    CO2 22 01/11/2017 02:29 PM    GLUCOSE 100* 01/11/2017 02:29 PM    BUN 16 01/11/2017 02:29 PM    CREATININE 0.74 01/11/2017 02:29 PM      Lab Results   Component Value Date/Time    WBC 12.5* 01/11/2017 02:29 PM    RBC 5.12 01/11/2017 02:29 PM    HEMOGLOBIN 10.9* 01/11/2017 02:29 PM    HEMATOCRIT 38.4 01/11/2017 02:29 PM    MCV 75.0* 01/11/2017 02:29 PM    MCH 21.3* 01/11/2017 02:29 PM    MCHC 28.4* 01/11/2017 02:29 PM    MPV 9.6 01/11/2017 02:29 PM    NEUTROPHILS-POLYS 85.10* 01/11/2017 02:29 PM    LYMPHOCYTES 7.00* 01/11/2017 02:29 PM    MONOCYTES 1.80 01/11/2017 02:29 PM    EOSINOPHILS 0.00 01/11/2017 02:29 PM    BASOPHILS 2.60* 01/11/2017 02:29 PM    HYPOCHROMIA 1+ 01/11/2017 02:29 PM    ANISOCYTOSIS 1+ 01/11/2017 02:29 PM      Lab Results   Component Value Date/Time    CALCIUM 9.8 01/11/2017 02:29 PM    AST(SGOT) 15 01/11/2017 02:29 PM    ALT(SGPT) 23 01/11/2017 02:29 PM    ALKALINE PHOSPHATASE 122* 01/11/2017 02:29 PM    TOTAL BILIRUBIN 0.2 01/11/2017 02:29 PM    ALBUMIN 4.5 01/11/2017 02:29 PM    TOTAL PROTEIN 8.2 01/11/2017 02:29 PM     Lab Results   Component Value Date/Time    RHEUMATOID FACTOR -NEPH- <10 01/06/2017 04:12 AM    ANTINUCLEAR ANTIBODY None Detected 01/06/2017 04:12 AM     Lab Results   Component Value Date/Time    SED RATE AJ 66* 01/05/2017 11:22 PM    STAT C-REACTIVE  PROTEIN 7.92* 01/05/2017 11:22 PM     No results found for: RUSSELVIPER, DRVVTINTERP  Lab Results   Component Value Date/Time    C3 COMPLEMENT 178.0 01/06/2017 04:12 AM    COMPLEMENT C4 37.0 01/06/2017 04:12 AM     Lab Results   Component Value Date/Time    ANTI-SCL-70 0 01/06/2017 04:12 AM    ANTI-CENTROMERE B AB 2 01/06/2017 04:12 AM     Lab Results   Component Value Date/Time    ANCA IGG <1:20 01/06/2017 04:12 AM    C3 COMPLEMENT 178.0 01/06/2017 04:12 AM     Lab Results   Component Value Date/Time    COLOR Colorless 01/06/2017 12:30 PM    SPECIFIC GRAVITY 1.006 01/06/2017 12:30 PM    PH 7.5 01/06/2017 12:30 PM    GLUCOSE Negative 01/06/2017 12:30 PM    KETONES Negative 01/06/2017 12:30 PM    PROTEIN Negative 01/06/2017 12:30 PM     No results found for: TOTPROTUR, TOTALVOLUME, DXUYDVHB31  No results found for: SSA60, SSA52  No results found for: HBA1C  Lab Results   Component Value Date/Time    CPK TOTAL 40 01/06/2017 04:12 AM     Lab Results   Component Value Date/Time    G-6-PD 20.4* 01/10/2017 01:54 PM     No results found for: OFYR01YQHO  No results found for: ACESERUM  Lab Results   Component Value Date/Time    25-HYDROXY   VITAMIN D 25 45 01/09/2017 06:00 PM     No results found for: TSH, FREEDIR  Lab Results   Component Value Date/Time    TSH 0.740 01/05/2017 11:22 PM    FREE T-4 0.76 01/05/2017 11:22 PM     No results found for: MICROSOMALA, ANTITHYROGL  No results found for: IGGLYMEABS  No results found for: ANTIMITOCHO, FACTIN  No results found for: IGA, TTRANSIGA, ENDOIGA  No results found for: FLTYPE, CRYSTALSBDF  No results found for: ISTATICAL  No results found for: ISTATCREAT  No results found for: CTELOPEP  No results found for: GBMABG  No results found for: PTHINTACT  1/27/2015 hepatitis E IgM    1/27/2015 ceruloplasmin = 29.7  1/27/2015 smith antibody = 9 (normal)  1/27/2015 MG negative  1/27/2015 alpha antitrypsin 119 ()  1/27/2015 ferritin = 75 (normal)  1/27/2015 GGT = 327  (0-55)    1/27/2015 IgG 4 = 11  10/31/2016 and 1/27/2015  p ANCA (-), c ANCA (-), PR3 (-)  10/31/2016 MPO (-)  12/7/2014 AST = 23 ALT = 137 Alk phos = 205  6/10/2016 ALT = 921 AST = 821 Alk phos = 537  Labs from JUne 2016 shows low albumin 2.7 and AST 92 and ALT of 370    6/11/2016; AST=92, ALT  = 370 Alk Phos = 331 Hgb = 8.6 MCV = 74.7 t bili = 1.2  10/15/2015: Hgb = 9.1 Plt =   ALT= 357 AST =127 Alk phos = 469 creatinine = 0.7    US LE venous doppler bilateral 6/30/2014  Probable baker's cyst on left lower extremity    Left hand XR 8/14/2014  Soft tissue swelling over the dorsum.  No erorsons no fractures     CT abdomen/Pelvis w/o Contrast 9/4/2014  Small amount of fluid in cul-de-sac no abnormalities in liver.  Abdominal aorta was normal.  Adrenal glands normal.  coritical medullatry calcifications noted of left kidney    CT head w w/o contrast 10/15/2016  No acute abnormalities and on CT maxillofacial w/ contrast - no evidence of sinusitis    CTchest w/con 1/12/2016  No findings to suggest aortic dissection or pulmonary embolus  Left renal 18 mm cyst in the ventral cortex medially    CT spine lumbar w/o contrast  8/30/2015  Small fluid  Collection posterior junction of the epidural catheter seen right lateral to the L2 posterior spinous process consistent with leakage and/or infection      Medical Decision Making:  Today's Assessment / Status / Plan:     Seronegative rheumatoid arthritis with involvement of the small joints of the hands and wrists and periarticular osteopenia on xray  She is on prednisone 15 mg and noted mild tenderness at the DIP.  We will obtain labs and consider increasing methotrexate.    We will also recheck RF today and cryoglobulins.  We will also check CCP    Septal perforation  ANCA serologies are negative x 3  MG Is negative  Biopsy results did not show active disease  CXR did not note hilar adenopathy  She did have an episode of epistaxis and I have advised her to follow-up with  ENT.    Abnormal LFT  Will monitor closely    1. Seronegative rheumatoid arthritis (CMS-HCC)  CCP    CBC WITH DIFFERENTIAL    COMP METABOLIC PANEL    CRP QUANTITIVE (NON-CARDIAC)    WESTERGREN SED RATE    RHEUMATOID ARTHRITIS FACTOR    CRYOGLOBULIN QL SERUM RFLX         Return in about 2 months (around 6/14/2017).    I have spent greater than 50% of this 30 minute visit in counseling/coordination of care with the patient regarding review of the differential and importance of tissue diagnosis to identify etiology of her septal perforation.    She agreed and verbalized her agreement and understanding with the current plan. I answered all questions and concerns she has at this time and advised her to call at any time in there interim with questions or concerns in regards to her care.    Thank you for allowing me to participate in her care, I will continue to follow closely.

## 2017-04-17 ENCOUNTER — APPOINTMENT (OUTPATIENT)
Dept: RADIOLOGY | Facility: MEDICAL CENTER | Age: 47
End: 2017-04-17
Attending: EMERGENCY MEDICINE

## 2017-04-17 ENCOUNTER — HOSPITAL ENCOUNTER (EMERGENCY)
Facility: MEDICAL CENTER | Age: 47
End: 2017-04-17
Attending: EMERGENCY MEDICINE

## 2017-04-17 VITALS
SYSTOLIC BLOOD PRESSURE: 131 MMHG | HEART RATE: 107 BPM | HEIGHT: 63 IN | DIASTOLIC BLOOD PRESSURE: 82 MMHG | BODY MASS INDEX: 28.52 KG/M2 | OXYGEN SATURATION: 94 % | WEIGHT: 160.94 LBS | RESPIRATION RATE: 16 BRPM | TEMPERATURE: 97.2 F

## 2017-04-17 DIAGNOSIS — G89.29 CHRONIC NONINTRACTABLE HEADACHE, UNSPECIFIED HEADACHE TYPE: ICD-10-CM

## 2017-04-17 DIAGNOSIS — R51.9 CHRONIC NONINTRACTABLE HEADACHE, UNSPECIFIED HEADACHE TYPE: ICD-10-CM

## 2017-04-17 DIAGNOSIS — M54.14 RADICULAR PAIN OF THORACIC REGION: ICD-10-CM

## 2017-04-17 DIAGNOSIS — Z86.69 HISTORY OF MIGRAINE: ICD-10-CM

## 2017-04-17 LAB
ALBUMIN SERPL BCP-MCNC: 4.5 G/DL (ref 3.2–4.9)
ALBUMIN/GLOB SERPL: 1.4 G/DL
ALP SERPL-CCNC: 72 U/L (ref 30–99)
ALT SERPL-CCNC: 35 U/L (ref 2–50)
ANION GAP SERPL CALC-SCNC: 10 MMOL/L (ref 0–11.9)
AST SERPL-CCNC: 31 U/L (ref 12–45)
BASOPHILS # BLD AUTO: 0.3 % (ref 0–1.8)
BASOPHILS # BLD: 0.04 K/UL (ref 0–0.12)
BILIRUB SERPL-MCNC: 0.4 MG/DL (ref 0.1–1.5)
BUN SERPL-MCNC: 16 MG/DL (ref 8–22)
CALCIUM SERPL-MCNC: 9.2 MG/DL (ref 8.5–10.5)
CHLORIDE SERPL-SCNC: 108 MMOL/L (ref 96–112)
CO2 SERPL-SCNC: 20 MMOL/L (ref 20–33)
CREAT SERPL-MCNC: 0.76 MG/DL (ref 0.5–1.4)
CRP SERPL HS-MCNC: 0.8 MG/L (ref 0–7.5)
EOSINOPHIL # BLD AUTO: 0 K/UL (ref 0–0.51)
EOSINOPHIL NFR BLD: 0 % (ref 0–6.9)
ERYTHROCYTE [DISTWIDTH] IN BLOOD BY AUTOMATED COUNT: 46.9 FL (ref 35.9–50)
ERYTHROCYTE [SEDIMENTATION RATE] IN BLOOD BY WESTERGREN METHOD: 5 MM/HOUR (ref 0–20)
GFR SERPL CREATININE-BSD FRML MDRD: >60 ML/MIN/1.73 M 2
GLOBULIN SER CALC-MCNC: 3.2 G/DL (ref 1.9–3.5)
GLUCOSE SERPL-MCNC: 84 MG/DL (ref 65–99)
HCT VFR BLD AUTO: 47.8 % (ref 37–47)
HGB BLD-MCNC: 15.7 G/DL (ref 12–16)
IMM GRANULOCYTES # BLD AUTO: 0.04 K/UL (ref 0–0.11)
IMM GRANULOCYTES NFR BLD AUTO: 0.3 % (ref 0–0.9)
LYMPHOCYTES # BLD AUTO: 0.69 K/UL (ref 1–4.8)
LYMPHOCYTES NFR BLD: 5.7 % (ref 22–41)
MCH RBC QN AUTO: 30.5 PG (ref 27–33)
MCHC RBC AUTO-ENTMCNC: 32.8 G/DL (ref 33.6–35)
MCV RBC AUTO: 93 FL (ref 81.4–97.8)
MONOCYTES # BLD AUTO: 0.25 K/UL (ref 0–0.85)
MONOCYTES NFR BLD AUTO: 2.1 % (ref 0–13.4)
NEUTROPHILS # BLD AUTO: 11.09 K/UL (ref 2–7.15)
NEUTROPHILS NFR BLD: 91.6 % (ref 44–72)
NRBC # BLD AUTO: 0 K/UL
NRBC BLD AUTO-RTO: 0 /100 WBC
PLATELET # BLD AUTO: 351 K/UL (ref 164–446)
PMV BLD AUTO: 9.6 FL (ref 9–12.9)
POTASSIUM SERPL-SCNC: 4.1 MMOL/L (ref 3.6–5.5)
PROT SERPL-MCNC: 7.7 G/DL (ref 6–8.2)
RBC # BLD AUTO: 5.14 M/UL (ref 4.2–5.4)
RHEUMATOID FACT SER IA-ACNC: <10 IU/ML (ref 0–14)
SODIUM SERPL-SCNC: 138 MMOL/L (ref 135–145)
WBC # BLD AUTO: 12.1 K/UL (ref 4.8–10.8)

## 2017-04-17 PROCEDURE — 700102 HCHG RX REV CODE 250 W/ 637 OVERRIDE(OP): Performed by: EMERGENCY MEDICINE

## 2017-04-17 PROCEDURE — 700105 HCHG RX REV CODE 258: Performed by: EMERGENCY MEDICINE

## 2017-04-17 PROCEDURE — 72040 X-RAY EXAM NECK SPINE 2-3 VW: CPT

## 2017-04-17 PROCEDURE — 96376 TX/PRO/DX INJ SAME DRUG ADON: CPT

## 2017-04-17 PROCEDURE — A9270 NON-COVERED ITEM OR SERVICE: HCPCS | Performed by: EMERGENCY MEDICINE

## 2017-04-17 PROCEDURE — 82595 ASSAY OF CRYOGLOBULIN: CPT

## 2017-04-17 PROCEDURE — 72070 X-RAY EXAM THORAC SPINE 2VWS: CPT

## 2017-04-17 PROCEDURE — 80053 COMPREHEN METABOLIC PANEL: CPT

## 2017-04-17 PROCEDURE — 86431 RHEUMATOID FACTOR QUANT: CPT

## 2017-04-17 PROCEDURE — 85025 COMPLETE CBC W/AUTO DIFF WBC: CPT

## 2017-04-17 PROCEDURE — 93005 ELECTROCARDIOGRAM TRACING: CPT | Performed by: EMERGENCY MEDICINE

## 2017-04-17 PROCEDURE — 96375 TX/PRO/DX INJ NEW DRUG ADDON: CPT

## 2017-04-17 PROCEDURE — 86141 C-REACTIVE PROTEIN HS: CPT

## 2017-04-17 PROCEDURE — 85652 RBC SED RATE AUTOMATED: CPT

## 2017-04-17 PROCEDURE — 99285 EMERGENCY DEPT VISIT HI MDM: CPT

## 2017-04-17 PROCEDURE — 96374 THER/PROPH/DIAG INJ IV PUSH: CPT

## 2017-04-17 PROCEDURE — 700111 HCHG RX REV CODE 636 W/ 250 OVERRIDE (IP): Performed by: EMERGENCY MEDICINE

## 2017-04-17 RX ORDER — ONDANSETRON 2 MG/ML
4 INJECTION INTRAMUSCULAR; INTRAVENOUS ONCE
Status: COMPLETED | OUTPATIENT
Start: 2017-04-17 | End: 2017-04-17

## 2017-04-17 RX ORDER — DEXAMETHASONE SODIUM PHOSPHATE 10 MG/ML
10 INJECTION, SOLUTION INTRAMUSCULAR; INTRAVENOUS ONCE
Status: COMPLETED | OUTPATIENT
Start: 2017-04-17 | End: 2017-04-17

## 2017-04-17 RX ORDER — OXYCODONE HYDROCHLORIDE 10 MG/1
10 TABLET ORAL EVERY 4 HOURS PRN
Qty: 18 TAB | Refills: 0 | Status: SHIPPED | OUTPATIENT
Start: 2017-04-17 | End: 2017-04-29

## 2017-04-17 RX ORDER — DIAZEPAM 5 MG/1
5 TABLET ORAL ONCE
Status: COMPLETED | OUTPATIENT
Start: 2017-04-17 | End: 2017-04-17

## 2017-04-17 RX ORDER — SODIUM CHLORIDE 9 MG/ML
1000 INJECTION, SOLUTION INTRAVENOUS ONCE
Status: COMPLETED | OUTPATIENT
Start: 2017-04-17 | End: 2017-04-17

## 2017-04-17 RX ORDER — KETOROLAC TROMETHAMINE 30 MG/ML
30 INJECTION, SOLUTION INTRAMUSCULAR; INTRAVENOUS ONCE
Status: COMPLETED | OUTPATIENT
Start: 2017-04-17 | End: 2017-04-17

## 2017-04-17 RX ORDER — DIPHENHYDRAMINE HYDROCHLORIDE 50 MG/ML
50 INJECTION INTRAMUSCULAR; INTRAVENOUS ONCE
Status: COMPLETED | OUTPATIENT
Start: 2017-04-17 | End: 2017-04-17

## 2017-04-17 RX ADMIN — HYDROMORPHONE HYDROCHLORIDE 1 MG: 1 INJECTION, SOLUTION INTRAMUSCULAR; INTRAVENOUS; SUBCUTANEOUS at 14:46

## 2017-04-17 RX ADMIN — KETOROLAC TROMETHAMINE 30 MG: 30 INJECTION, SOLUTION INTRAMUSCULAR at 12:34

## 2017-04-17 RX ADMIN — ONDANSETRON 4 MG: 2 INJECTION INTRAMUSCULAR; INTRAVENOUS at 12:51

## 2017-04-17 RX ADMIN — SODIUM CHLORIDE 1000 ML: 9 INJECTION, SOLUTION INTRAVENOUS at 12:37

## 2017-04-17 RX ADMIN — DIAZEPAM 5 MG: 5 TABLET ORAL at 12:34

## 2017-04-17 RX ADMIN — DIPHENHYDRAMINE HYDROCHLORIDE 50 MG: 50 INJECTION, SOLUTION INTRAMUSCULAR; INTRAVENOUS at 15:43

## 2017-04-17 RX ADMIN — DEXAMETHASONE SODIUM PHOSPHATE 10 MG: 10 INJECTION, SOLUTION INTRAMUSCULAR; INTRAVENOUS at 15:44

## 2017-04-17 RX ADMIN — HYDROMORPHONE HYDROCHLORIDE 1 MG: 1 INJECTION, SOLUTION INTRAMUSCULAR; INTRAVENOUS; SUBCUTANEOUS at 12:35

## 2017-04-17 ASSESSMENT — ENCOUNTER SYMPTOMS
HEADACHES: 1
COUGH: 0
FEVER: 1
NAUSEA: 1
VOMITING: 1
DIZZINESS: 0
SHORTNESS OF BREATH: 0
NECK PAIN: 1

## 2017-04-17 ASSESSMENT — PAIN SCALES - GENERAL
PAINLEVEL_OUTOF10: 8
PAINLEVEL_OUTOF10: 8

## 2017-04-17 NOTE — ED AVS SNAPSHOT
FIGMD Access Code: Activation code not generated  Current FIGMD Status: Active    [employer]AR[employer]art  A secure, online tool to manage your health information     [employer]’s FIGMD® is a secure, online tool that connects you to your personalized health information from the privacy of your home -- day or night - making it very easy for you to manage your healthcare. Once the activation process is completed, you can even access your medical information using the FIGMD brice, which is available for free in the Apple Brice store or Google Play store.     FIGMD provides the following levels of access (as shown below):   My Chart Features   [facility] Primary Care Doctor [facility]  Specialists [facility]  Urgent  Care Non-[facility]  Primary Care  Doctor   Email your healthcare team securely and privately 24/7 X X X X   Manage appointments: schedule your next appointment; view details of past/upcoming appointments X      Request prescription refills. X      View recent personal medical records, including lab and immunizations X X X X   View health record, including health history, allergies, medications X X X X   Read reports about your outpatient visits, procedures, consult and ER notes X X X X   See your discharge summary, which is a recap of your hospital and/or ER visit that includes your diagnosis, lab results, and care plan. X X       How to register for FIGMD:  1. Go to  [email or web address].  2. Click on the Sign Up Now box, which takes you to the New Member Sign Up page. You will need to provide the following information:  a. Enter your FIGMD Access Code exactly as it appears at the top of this page. (You will not need to use this code after you’ve completed the sign-up process. If you do not sign up before the expiration date, you must request a new code.)   b. Enter your date of birth.   c. Enter your home email address.   d. Click Submit, and follow the next screen’s instructions.  3. Create a FIGMD ID. This will  be your [employer] login ID and cannot be changed, so think of one that is secure and easy to remember.  4. Create a MyT[employer] password. You can change your password at any time.  5. Enter your Password Reset Question and Answer. This can be used at a later time if you forget your password.   6. Enter your e-mail address. This allows you to receive e-mail notifications when new information is available in MyTinks.  7. Click Sign Up. You can now view your health information.    For assistance activating your My[employer] account, call [phone]

## 2017-04-17 NOTE — ED PROVIDER NOTES
ED Provider Note    Scribed for Susie Aguilar D.O. by Jerzy Dejesus. 4/17/2017, 10:50 AM.    Primary care provider: Elliott Power M.D.  Means of arrival: Walk in   History obtained from: Patient  History limited by: None    CHIEF COMPLAINT  Chief Complaint   Patient presents with   • Shoulder Pain     left, x 1 week   • Head Ache   • N/V       HPI  Enedelia Pang is a 46 y.o. female who presents to the Emergency Department complaining of shoulder pain, headache, nausea, and vomiting. The patient has a history of migraines. She reports of left shoulder pain onset 1 week ago which is exacerbating her migraines and nausea.  She is in the process of being evaluated for an autoimmune disorder. She initially thought her left shoulder pain was related to the autoimmune symptoms, however, her physician recently said her shoulder pain was not related. She has been treating herself with medication with no relief. Patient has a occipital nerve stimulator for migraines (Placed in 2007) and is followed by Dr. Oh (Neurosurgeon). She recently, she reports in December of last year, had the battery replaced for this occipital nerve stimulator. She took benadryl and Toradol at 2:00 AM this morning. She took Tylenol and Benadryl at 7:30 AM as well as Neurontin and 15mg Prednisone.   Patient's recently undergone nerve conduction studies as she's developed numbness and decreased ability to  in her bilateral upper extremities.  No fever this week. She treats herself with Tylenol and pain medications. She also takes oxycodone for breakthrough pain and she ran out yesterday. She vomited this morning.  She denies any abdominal pain, cough, chest pain, shortness of breath. See below for past medical history.     REVIEW OF SYSTEMS  Review of Systems   Constitutional: Positive for fever.   HENT: Negative for congestion.    Respiratory: Negative for cough and shortness of breath.    Cardiovascular: Negative for chest pain.    Gastrointestinal: Positive for nausea and vomiting.   Genitourinary: Positive for dysuria.   Musculoskeletal: Positive for neck pain.        Positive for shoulder pain.    Neurological: Positive for headaches. Negative for dizziness.   All other systems reviewed and are negative.  C    PAST MEDICAL HISTORY   has a past medical history of Migraine with aura, with intractable migraine, so stated, without mention of status migrainosus; H/O Clostridium difficile infection; Hydrocephalus; MRSA infection; Port-a-cath in place; Pituitary abnormality (CMS-HCC); Allergy, unspecified not elsewhere classified; Anxiety; Depression; Stroke (CMS-HCC); Cold; Snoring; and Pain.    SURGICAL HISTORY   has past surgical history that includes cholecystectomy; tonsillectomy; appendectomy; tubal ligation; other; other neurological surg; irrigation & debridement neuro (N/A, 6/28/2015); lumbar exploration (N/A, 8/31/2015); and spinal cord stimulator (12/19/2016).    SOCIAL HISTORY  Social History   Substance Use Topics   • Smoking status: Never Smoker    • Smokeless tobacco: Never Used   • Alcohol Use: No      History   Drug Use No       FAMILY HISTORY  No family history noted    CURRENT MEDICATIONS  No current facility-administered medications for this encounter.    Current outpatient prescriptions:   •  oxycodone immediate release (ROXICODONE) 10 MG immediate release tablet, Take 1 Tab by mouth every four hours as needed for Moderate Pain., Disp: 18 Tab, Rfl: 0  •  methotrexate 2.5 MG Tab, Take 5 Tabs by mouth every 7 days., Disp: 20 Tab, Rfl: 1  •  folic acid (FOLVITE) 1 MG Tab, Take 1 Tab by mouth every day., Disp: 30 Tab, Rfl: 11  •  sulfaSALAzine (AZULFIDINE) 500 MG Tab, Take 2 Tabs by mouth 2 times a day., Disp: 120 Tab, Rfl: 3  •  predniSONE (DELTASONE) 5 MG Tab, 30 mg po q day x 7 days decrease by 5 mg every week until 10 mg po q day then stay at this dose until seen., Disp: 150 Tab, Rfl: 0  •  FEVERFEW PO, Take  by mouth.,  Disp: , Rfl:   •  ferrous gluconate (FERGON) 324 (38 FE) MG Tab, Take 1 Tab by mouth 2 times a day, with meals., Disp: 60 Tab, Rfl: 1  •  calcium carbonate (CALCIUM CARBONATE) 500 MG Tab, Take 1 Tab by mouth 2 times a day, with meals., Disp: 90 Tab, Rfl: 1  •  cyclobenzaprine (FLEXERIL) 10 MG Tab, Take 1 Tab by mouth 3 times a day as needed for Muscle Spasms., Disp: 30 Tab, Rfl: 0  •  omeprazole (PRILOSEC) 20 MG delayed-release capsule, Take 1 Cap by mouth every day., Disp: 30 Cap, Rfl: 2  •  vitamin D (VITAMIND D3) 1000 UNIT Tab, Take 1 Tab by mouth every day., Disp: 60 Tab, Rfl:   •  gabapentin (NEURONTIN) 600 MG tablet, Take 600 mg by mouth 3 times a day., Disp: , Rfl:   •  ketorolac (TORADOL) 60 MG/2ML Solution, 60 mg by Intramuscular route Once PRN (migraine)., Disp: , Rfl:   •  MAGNESIUM OXIDE PO, Take 1 Tab by mouth every day., Disp: , Rfl:   •  cephALEXin (KEFLEX) 500 MG Cap, Take 500 mg by mouth 4 times a day. 5 day course, finished 12/24/16, Disp: , Rfl:   •  oxycodone immediate release (ROXICODONE) 10 MG immediate release tablet, Take 1 Tab by mouth every four hours as needed for Moderate Pain., Disp: 100 Tab, Rfl: 0  •  acetaZOLAMIDE SR (DIAMOX) 500 MG CAPSULE SR 12 HR, Take 500 mg by mouth 2 times a day., Disp: , Rfl:   •  Multiple Vitamins-Minerals (MULTIVITAMIN ADULT PO), Take 1 Tab by mouth every day., Disp: , Rfl:   •  Cyanocobalamin (VITAMIN B 12 PO), Take 1 Tab by mouth every day., Disp: , Rfl:   •  diphenhydrAMINE (BENADRYL) 50 MG/ML Solution, INJECT IM EVERY 6 HOURS AS NEEDED FOR ACUTE, SEVERE MIGRAINE HEADACHE AS DIRECTED, Disp: 25 mL, Rfl: 5  •  diphenhydrAMINE (BENADRYL) 50 MG tablet, Take 1 Tab by mouth every 6 hours as needed (Headache, give with toradol)., Disp: 30 Tab, Rfl: 0  •  duloxetine (CYMBALTA) 60 MG CPEP delayed-release capsule, Take 60 mg by mouth 2 times a day., Disp: , Rfl:     ALLERGIES  Allergies   Allergen Reactions   • Prochlorperazine Swelling     Tongue swelling. Reaction  "as a teen. (compazine)   • Sumatriptan Unspecified     Historical=\"Heart stops.\" Reaction  between 1995 to 1997   • Vancomycin Shortness of Breath and Rash     Reaction in 2005.  D/W patient 8/31/15 - tolerated loading dose of vancomycin administered on 8/30/15 with some itching to chest with decreased infusion rate. Red Man Syndrome       PHYSICAL EXAM  VITAL SIGNS: /82 mmHg  Pulse 89  Temp(Src) 36 °C (96.8 °F)  Resp 18  Ht 1.6 m (5' 3\")  Wt 73 kg (160 lb 15 oz)  BMI 28.52 kg/m2  SpO2 100%  Vitals reviewed.  Constitutional: Patient is oriented to person, place, and time. Appears well-developed and well-nourished. mild distress.    Head: Normocephalic and atraumatic.   Ears: Normal external ears bilaterally.   Mouth/Throat: Oropharynx is clear and moist, no exudates.   Eyes: Conjunctivae are normal. Pupils are equal, round, and reactive to light.   Neck: Normal range of motion. Neck supple.  Cardiovascular: Normal rate, regular rhythm and normal heart sounds. Normal peripheral pulses.  Pulmonary/Chest: Effort normal and breath sounds normal. No respiratory distress, no wheezes, rhonchi, or rales.   Abdominal: Soft. Bowel sounds are normal. There is no tenderness, rebound or guarding, or peritoneal signs. No CVA tenderness.  Musculoskeletal: No edema.  Back: Left sided cervical and thoracic paraspinal muscle soreness. Focal areas of tenderness over left lower cervical region, over the area of the occipital nerve stimulator. 2 scars to thoracic region with focal tenderness to left scar over wire of stimulator. The overlying skin changes. No midline tenderness.  Lymphadenopathy: No cervical adenopathy.   Neurological: Patient has paresthesias in her left thumb, index and middle finger, this is not new. She also has decreased  strength bilaterally, right greater than left.  Skin: Skin is warm and dry. No erythema. No pallor.   Psychiatric: Patient has a normal mood and affect.     LABS  Results for " orders placed or performed during the hospital encounter of 04/17/17   CBC WITH DIFFERENTIAL   Result Value Ref Range    WBC 12.1 (H) 4.8 - 10.8 K/uL    RBC 5.14 4.20 - 5.40 M/uL    Hemoglobin 15.7 12.0 - 16.0 g/dL    Hematocrit 47.8 (H) 37.0 - 47.0 %    MCV 93.0 81.4 - 97.8 fL    MCH 30.5 27.0 - 33.0 pg    MCHC 32.8 (L) 33.6 - 35.0 g/dL    RDW 46.9 35.9 - 50.0 fL    Platelet Count 351 164 - 446 K/uL    MPV 9.6 9.0 - 12.9 fL    Neutrophils-Polys 91.60 (H) 44.00 - 72.00 %    Lymphocytes 5.70 (L) 22.00 - 41.00 %    Monocytes 2.10 0.00 - 13.40 %    Eosinophils 0.00 0.00 - 6.90 %    Basophils 0.30 0.00 - 1.80 %    Immature Granulocytes 0.30 0.00 - 0.90 %    Nucleated RBC 0.00 /100 WBC    Neutrophils (Absolute) 11.09 (H) 2.00 - 7.15 K/uL    Lymphs (Absolute) 0.69 (L) 1.00 - 4.80 K/uL    Monos (Absolute) 0.25 0.00 - 0.85 K/uL    Eos (Absolute) 0.00 0.00 - 0.51 K/uL    Baso (Absolute) 0.04 0.00 - 0.12 K/uL    Immature Granulocytes (abs) 0.04 0.00 - 0.11 K/uL    NRBC (Absolute) 0.00 K/uL   CMP   Result Value Ref Range    Sodium 138 135 - 145 mmol/L    Potassium 4.1 3.6 - 5.5 mmol/L    Chloride 108 96 - 112 mmol/L    Co2 20 20 - 33 mmol/L    Anion Gap 10.0 0.0 - 11.9    Glucose 84 65 - 99 mg/dL    Bun 16 8 - 22 mg/dL    Creatinine 0.76 0.50 - 1.40 mg/dL    Calcium 9.2 8.5 - 10.5 mg/dL    AST(SGOT) 31 12 - 45 U/L    ALT(SGPT) 35 2 - 50 U/L    Alkaline Phosphatase 72 30 - 99 U/L    Total Bilirubin 0.4 0.1 - 1.5 mg/dL    Albumin 4.5 3.2 - 4.9 g/dL    Total Protein 7.7 6.0 - 8.2 g/dL    Globulin 3.2 1.9 - 3.5 g/dL    A-G Ratio 1.4 g/dL   CRP HIGH SENSITIVE (CARDIAC)   Result Value Ref Range    C Reactive Protein High Sensitive 0.8 0.0 - 7.5 mg/L   WESTERGREN SED RATE   Result Value Ref Range    Sed Rate Westergren 5 0 - 20 mm/hour   RHEUMATOID ARTHRITIS FACTOR   Result Value Ref Range    Rheumatoid Factor -Neph- <10 0 - 14 IU/mL   ESTIMATED GFR   Result Value Ref Range    GFR If African American >60 >60 mL/min/1.73 m 2    GFR If  Non African American >60 >60 mL/min/1.73 m 2   EKG (ER)   Result Value Ref Range    Report       [facility] Emergency Dept.    Test Date:  2017  Pt Name:    BRIGID DAWSON                 Department: ER  MRN:        [id number]                      Room:       [facility]  Gender:     F                            Technician: SLS  :        1970                   Requested By:MEENA RAYMUNDO  Order #:    [id number]                    Reading MD:    Measurements  Intervals                                Axis  Rate:       70                           P:          53  NE:         164                          QRS:        126  QRSD:       110                          T:          37  QT:         400  QTc:        432    Interpretive Statements  SINUS RHYTHM  IRBBB AND LPFB  BORDERLINE LOW VOLTAGE IN FRONTAL LEADS  Compared to ECG 2016 15:00:20  Left posterior fascicular block now present  Incomplete right bundle-branch block now present         All labs reviewed by me.    RADIOLOGY  DX-CERVICAL SPINE-2 OR 3 VIEWS   Final Result      Suboccipital stimulator leads are without worrisome change. No discontinuity is seen across the cervical and thoracic spine regions. The generator is not included in the field-of-view, distally      DX-THORACIC SPINE-2 VIEWS   Final Result      Suboccipital stimulator leads are without worrisome change. No discontinuity is seen across the cervical and thoracic spine regions. The generator is not included in the field-of-view, distally        The radiologist's interpretation of all radiological studies have been reviewed by me.    COURSE & MEDICAL DECISION MAKING  Pertinent Labs & Imaging studies reviewed. (See chart for details)    Obtained and reviewed past medical records. This patient unfortunately has had a comp located past medical history. She had previously a  shunt which became infected and had to be removed. In addition, she had a port which got infected and  "had to be removed. She had the right side of her occipital nerve stimulator removed also due to infection. She has requested, and brought a list with her, of multiple lab tests that she was supposed to have drawn today for her rheumatologist and she is requesting having them drawn in the emergency room now.    10:50 AM - Patient seen and examined at bedside.      11:39 AM - Patient re-evaluated at bedside. I discussed with patient plan of treatment and evaluation. Patient understood and agreed. Patient will be treated with IV fluids, Dilaudid 1mg IV, TORADOL IV, Valium 5mg PO. Ordered CBC with differential, CMP, CRP High sensitive, Westergren SED rate, estimated GFR, Rheumatoid arthritis factor. Differential Diagnoses include but are not limited to muscle spasms, hardware malfunction, rediculopathy.     12:13 PM - Ordered DX-cervical spine, DX-thoracic spine, cryoglobulin ql serum rflx.     12:45 PM - Patient will be treated with Zofran 4mg IV.     2:10 PM - Patient re-evaluated at bedside. Patient reports her headache is \"tolerable\". She still has pain around her scar in the thoracic region and radicular pain is still 8/10. Discussed with patient her x-ray results noted above.  Paged spinal surgeon.     2:31 PM - I discussed the patient's case and the above findings with Dr. Oh (Neurosurgery) who recommended patient follow up with PM&R.     3:28 PM - Patient re-evaluated at bedside. She is feeling better. She is requesting Benadryl. I also think she could benefit from an additional, long-acting dose of Decadron. I informed her I spoke to Dr. Oh who recommended the patient follow up with [facility] for further treatment. I advised patient to follow up and return to the ED if symptoms worsen. The patient understood and agree.     The patient is referred to a primary physician for blood pressure management, diabetic screening, and for all other preventative health " concerns.    DISPOSITION:  Patient will be discharged home in stable condition.    FOLLOW UP:  Oli Oh III, M.D.  [address]  [facility] [address]  [phone]      for evaluation by PM and R      OUTPATIENT MEDICATIONS:  Discharge Medication List as of 4/17/2017  4:10 PM      START taking these medications    Details   !! oxycodone immediate release (ROXICODONE) 10 MG immediate release tablet Take 1 Tab by mouth every four hours as needed for Moderate Pain., Disp-18 Tab, R-0, Print Rx Paper       !! - Potential duplicate medications found. Please discuss with provider.          FINAL IMPRESSION  1. Radicular pain of thoracic region    2. Chronic nonintractable headache, unspecified headache type    3. History of migraine          Jerzy MADERA (Scribe), am scribing for, and in the presence of, Susie Aguilar D.O..    Electronically signed by: Jerzy Dejesus (Scribe), 4/17/2017    ISusie D.O. personally performed the services described in this documentation, as scribed by Jerzy Dejesus in my presence, and it is both accurate and complete.    The note accurately reflects work and decisions made by me.  Susie Aguilar  4/17/2017  5:29 PM

## 2017-04-17 NOTE — ED NOTES
Sling to lue for comfort. Pt discharged home with s/o. Pt to follow up with Dr. Oh.  Given prescription x 1 with indication.

## 2017-04-17 NOTE — ED NOTES
45 y/o female ambulate to triage   Chief Complaint   Patient presents with   • Shoulder Pain     left, x 1 week   • Head Ache   • N/V     Pt has a transmitter for Migraines (placed in 2007) just replaced lower battery in Nov

## 2017-04-17 NOTE — DISCHARGE INSTRUCTIONS
Call Dr. Oh's office for evaluation by PM and R for possible trigger point injecitons.    Radicular Pain  Radicular pain in either the arm or leg is usually from a bulging or herniated disk in the spine. A piece of the herniated disk may press against the nerves as the nerves exit the spine. This causes pain which is felt at the tips of the nerves down the arm or leg. Other causes of radicular pain may include:  · Fractures.  · Heart disease.  · Cancer.  · An abnormal and usually degenerative state of the nervous system or nerves (neuropathy).  Diagnosis may require CT or MRI scanning to determine the primary cause.   Nerves that start at the neck (nerve roots) may cause radicular pain in the outer shoulder and arm. It can spread down to the thumb and fingers. The symptoms vary depending on which nerve root has been affected. In most cases radicular pain improves with conservative treatment. Neck problems may require physical therapy, a neck collar, or cervical traction. Treatment may take many weeks, and surgery may be considered if the symptoms do not improve.   Conservative treatment is also recommended for sciatica. Sciatica causes pain to radiate from the lower back or buttock area down the leg into the foot. Often there is a history of back problems. Most patients with sciatica are better after 2 to 4 weeks of rest and other supportive care. Short term bed rest can reduce the disk pressure considerably. Sitting, however, is not a good position since this increases the pressure on the disk. You should avoid bending, lifting, and all other activities which make the problem worse. Traction can be used in severe cases. Surgery is usually reserved for patients who do not improve within the first months of treatment.  Only take over-the-counter or prescription medicines for pain, discomfort, or fever as directed by your caregiver. Narcotics and muscle relaxants may help by relieving more severe pain and spasm and  by providing mild sedation. Cold or massage can give significant relief. Spinal manipulation is not recommended. It can increase the degree of disc protrusion. Epidural steroid injections are often effective treatment for radicular pain. These injections deliver medicine to the spinal nerve in the space between the protective covering of the spinal cord and back bones (vertebrae). Your caregiver can give you more information about steroid injections. These injections are most effective when given within two weeks of the onset of pain.   You should see your caregiver for follow up care as recommended. A program for neck and back injury rehabilitation with stretching and strengthening exercises is an important part of management.   SEEK IMMEDIATE MEDICAL CARE IF:  · You develop increased pain, weakness, or numbness in your arm or leg.  · You develop difficulty with bladder or bowel control.  · You develop abdominal pain.     This information is not intended to replace advice given to you by your health care provider. Make sure you discuss any questions you have with your health care provider.     Document Released: 01/25/2006 Document Revised: 01/08/2016 Document Reviewed: 04/12/2010  [employer] Interactive Patient Education ©2016 [employer]    Migraine Headache  A migraine headache is an intense, throbbing pain on one or both sides of your head. A migraine can last for 30 minutes to several hours.  CAUSES   The exact cause of a migraine headache is not always known. However, a migraine may be caused when nerves in the brain become irritated and release chemicals that cause inflammation. This causes pain.  Certain things may also trigger migraines, such as:  · Alcohol.  · Smoking.  · Stress.  · Menstruation.  · Aged cheeses.  · Foods or drinks that contain nitrates, glutamate, aspartame, or tyramine.  · Lack of sleep.  · Chocolate.  · Caffeine.  · Hunger.  · Physical exertion.  · Fatigue.  · Medicines used to treat  chest pain (nitroglycerine), birth control pills, estrogen, and some blood pressure medicines.  SIGNS AND SYMPTOMS  · Pain on one or both sides of your head.  · Pulsating or throbbing pain.  · Severe pain that prevents daily activities.  · Pain that is aggravated by any physical activity.  · Nausea, vomiting, or both.  · Dizziness.  · Pain with exposure to bright lights, loud noises, or activity.  · General sensitivity to bright lights, loud noises, or smells.  Before you get a migraine, you may get warning signs that a migraine is coming (aura). An aura may include:  · Seeing flashing lights.  · Seeing bright spots, halos, or zigzag lines.  · Having tunnel vision or blurred vision.  · Having feelings of numbness or tingling.  · Having trouble talking.  · Having muscle weakness.  DIAGNOSIS   A migraine headache is often diagnosed based on:  · Symptoms.  · Physical exam.  · A CT scan or MRI of your head. These imaging tests cannot diagnose migraines, but they can help rule out other causes of headaches.  TREATMENT  Medicines may be given for pain and nausea. Medicines can also be given to help prevent recurrent migraines.   HOME CARE INSTRUCTIONS  · Only take over-the-counter or prescription medicines for pain or discomfort as directed by your health care provider. The use of long-term narcotics is not recommended.  · Lie down in a dark, quiet room when you have a migraine.  · Keep a journal to find out what may trigger your migraine headaches. For example, write down:  ¨ What you eat and drink.  ¨ How much sleep you get.  ¨ Any change to your diet or medicines.  · Limit alcohol consumption.  · Quit smoking if you smoke.  · Get 7-9 hours of sleep, or as recommended by your health care provider.  · Limit stress.  · Keep lights dim if bright lights bother you and make your migraines worse.  SEEK IMMEDIATE MEDICAL CARE IF:   · Your migraine becomes severe.  · You have a fever.  · You have a stiff neck.  · You have  vision loss.  · You have muscular weakness or loss of muscle control.  · You start losing your balance or have trouble walking.  · You feel faint or pass out.  · You have severe symptoms that are different from your first symptoms.  MAKE SURE YOU:   · Understand these instructions.  · Will watch your condition.  · Will get help right away if you are not doing well or get worse.     This information is not intended to replace advice given to you by your health care provider. Make sure you discuss any questions you have with your health care provider.     Document Released: 12/18/2006 Document Revised: 01/08/2016 Document Reviewed: 08/25/2014  [employer] Interactive Patient Education ©2016 [employer]

## 2017-04-17 NOTE — ED AVS SNAPSHOT
4/17/2017    Enedelia Pang  [address] [place] [address]    Dear Enedelia:    Critical access hospital wants to ensure your discharge home is safe and you or your loved ones have had all of your questions answered regarding your care after you leave the hospital.    Below is a list of resources and contact information should you have any questions regarding your hospital stay, follow-up instructions, or active medical symptoms.    Questions or Concerns Regarding… Contact   Medical Questions Related to Your Discharge  (7 days a week, 8am-5pm) Contact a Nurse Care Coordinator   [phone]   Medical Questions Not Related to Your Discharge  (24 hours a day / 7 days a week)  Contact the Nurse Health Line   [phone]    Medications or Discharge Instructions Refer to your discharge packet   or contact your [facility] Primary Care Provider   [phone]   Follow-up Appointment(s) Schedule your appointment via [email or web address]   or contact Scheduling [phone]   Billing Review your statement via [email or web address]  or contact Billing [phone]   Medical Records Review your records via [email or web address]   or contact Medical Records [phone]     You may receive a telephone call within two days of discharge. This call is to make certain you understand your discharge instructions and have the opportunity to have any questions answered. You can also easily access your medical information, test results and upcoming appointments via the [email or web address] free online health management tool. You can learn more and sign up at [email or web address]. For assistance setting up your [email or web address] account, please call [phone].    Once again, we want to ensure your discharge home is safe and that you have a clear understanding of any next steps in your care. If you have any questions or concerns, please do not hesitate to contact us, we are here for you. Thank you for choosing [facility] for your healthcare needs.    Sincerely,    Your [facility] Healthcare Team

## 2017-04-17 NOTE — ED AVS SNAPSHOT
Home Care Instructions                                                                                                                Enedelia Pang   MRN: [id number]    Department:  [facility], Emergency Dept   Date of Visit:  4/17/2017            [facility], Emergency Dept    [address]    [place] [address]    Phone:  [phone]      You were seen by     Susie Aguilar D.O.      Your Diagnosis Was     Radicular pain of thoracic region     M54.14       These are the medications you received during your hospitalization from 04/17/2017 0926 to 04/17/2017 1610     Date/Time Order Dose Route Action    04/17/2017 1237 NS infusion 1,000 mL 1,000 mL Intravenous New Bag    04/17/2017 1235 HYDROmorphone (DILAUDID) injection 1 mg 1 mg Intravenous Given    04/17/2017 1234 ketorolac (TORADOL) injection 30 mg Intravenous Given    04/17/2017 1234 diazepam (VALIUM) tablet 5 mg 5 mg Oral Given    04/17/2017 1251 ondansetron (ZOFRAN) syringe/vial injection 4 mg 4 mg Intravenous Given    04/17/2017 1446 HYDROmorphone (DILAUDID) injection 1 mg 1 mg Intravenous Given    04/17/2017 1544 dexamethasone pf (DECADRON) injection 10 mg 10 mg Intravenous Given    04/17/2017 1543 diphenhydrAMINE (BENADRYL) injection 50 mg 50 mg Intravenous Given      Follow-up Information     1. Follow up with Oli Oh III, M.D..    Specialty:  Neurosurgery    Why:  for evaluation by PM and R    Contact information    [address]  [place] [address] [phone]        Medication Information     Review all of your home medications and newly ordered medications with your primary doctor and/or pharmacist as soon as possible. Follow medication instructions as directed by your doctor and/or pharmacist.     Please keep your complete medication list with you and share with your physician. Update the information when medications are discontinued, doses are changed, or new medications (including  over-the-counter products) are added; and carry medication information at all times in the event of emergency situations.               Medication List      ASK your doctor about these medications        Instructions    Morning Afternoon Evening Bedtime    acetaZOLAMIDE  MG Cp12   Commonly known as:  DIAMOX        Take 500 mg by mouth 2 times a day.   Dose:  500 mg                        calcium carbonate 500 MG Tabs   Commonly known as:  OS-ZAIN 500        Take 1 Tab by mouth 2 times a day, with meals.   Dose:  500 mg                        cephALEXin 500 MG Caps   Commonly known as:  KEFLEX        Take 500 mg by mouth 4 times a day. 5 day course, finished 12/24/16   Dose:  500 mg                        Cholecalciferol 1000 UNIT Tabs   Commonly known as:  VITAMIND D3        Take 1 Tab by mouth every day.   Dose:  1000 Units                        cyclobenzaprine 10 MG Tabs   Commonly known as:  FLEXERIL        Take 1 Tab by mouth 3 times a day as needed for Muscle Spasms.   Dose:  10 mg                        * diphenhydrAMINE 50 MG tablet   Commonly known as:  BENADRYL        Take 1 Tab by mouth every 6 hours as needed (Headache, give with toradol).   Dose:  50 mg                        * diphenhydrAMINE 50 MG/ML Soln   Last time this was given:  50 mg on 4/17/2017  3:43 PM   Commonly known as:  BENADRYL        INJECT IM EVERY 6 HOURS AS NEEDED FOR ACUTE, SEVERE MIGRAINE HEADACHE AS DIRECTED                        duloxetine 60 MG Cpep delayed-release capsule   Commonly known as:  CYMBALTA        Take 60 mg by mouth 2 times a day.   Dose:  60 mg                        ferrous gluconate 324 (38 FE) MG Tabs   Commonly known as:  FERGON        Take 1 Tab by mouth 2 times a day, with meals.   Dose:  324 mg                        FEVERFEW PO        Take  by mouth.                        folic acid 1 MG Tabs   Commonly known as:  FOLVITE        Take 1 Tab by mouth every day.   Dose:  1 mg                         gabapentin 600 MG tablet   Commonly known as:  NEURONTIN        Take 600 mg by mouth 3 times a day.   Dose:  600 mg                        ketorolac 60 MG/2ML Soln   Commonly known as:  TORADOL        60 mg by Intramuscular route Once PRN (migraine).   Dose:  60 mg                        MAGNESIUM OXIDE PO        Take 1 Tab by mouth every day.   Dose:  1 Tab                        methotrexate 2.5 MG Tabs        Take 5 Tabs by mouth every 7 days.   Dose:  12.5 mg                        MULTIVITAMIN ADULT PO        Take 1 Tab by mouth every day.   Dose:  1 Tab                        omeprazole 20 MG delayed-release capsule   Commonly known as:  PRILOSEC        Take 1 Cap by mouth every day.   Dose:  20 mg                        * oxycodone immediate release 10 MG immediate release tablet   What changed:  Another medication with the same name was added. Make sure you understand how and when to take each.   Commonly known as:  ROXICODONE   Ask about: Which instructions should I use?        Take 1 Tab by mouth every four hours as needed for Moderate Pain.   Dose:  10 mg                        * oxycodone immediate release 10 MG immediate release tablet   What changed:  You were already taking a medication with the same name, and this prescription was added. Make sure you understand how and when to take each.   Commonly known as:  ROXICODONE   Ask about: Which instructions should I use?        Take 1 Tab by mouth every four hours as needed for Moderate Pain.   Dose:  10 mg                        predniSONE 5 MG Tabs   Commonly known as:  DELTASONE        30 mg po q day x 7 days decrease by 5 mg every week until 10 mg po q day then stay at this dose until seen.                        sulfaSALAzine 500 MG Tabs   Commonly known as:  AZULFIDINE        Take 2 Tabs by mouth 2 times a day.   Dose:  1000 mg                        VITAMIN B 12 PO        Take 1 Tab by mouth every day.   Dose:  1 Tab                        *  Notice:  This list has 4 medication(s) that are the same as other medications prescribed for you. Read the directions carefully, and ask your doctor or other care provider to review them with you.         Where to Get Your Medications      You can get these medications from any pharmacy     Bring a paper prescription for each of these medications    - oxycodone immediate release 10 MG immediate release tablet            Procedures and tests performed during your visit     Procedure/Test Number of Times Performed    CBC WITH DIFFERENTIAL 1    CMP 1    CRP HIGH SENSITIVE (CARDIAC) 1    DX-CERVICAL SPINE-2 OR 3 VIEWS 1    DX-THORACIC SPINE-2 VIEWS 1    EKG (ER) 1    ESTIMATED GFR 1    IV Saline Lock 1    NURSING COMMUNICATION 2    RHEUMATOID ARTHRITIS FACTOR 1    WESTERGREN SED RATE 1        Discharge Instructions       Call Dr. Oh's office for evaluation by PM and R for possible trigger point injecitons.    Radicular Pain  Radicular pain in either the arm or leg is usually from a bulging or herniated disk in the spine. A piece of the herniated disk may press against the nerves as the nerves exit the spine. This causes pain which is felt at the tips of the nerves down the arm or leg. Other causes of radicular pain may include:  · Fractures.  · Heart disease.  · Cancer.  · An abnormal and usually degenerative state of the nervous system or nerves (neuropathy).  Diagnosis may require CT or MRI scanning to determine the primary cause.   Nerves that start at the neck (nerve roots) may cause radicular pain in the outer shoulder and arm. It can spread down to the thumb and fingers. The symptoms vary depending on which nerve root has been affected. In most cases radicular pain improves with conservative treatment. Neck problems may require physical therapy, a neck collar, or cervical traction. Treatment may take many weeks, and surgery may be considered if the symptoms do not improve.   Conservative treatment is also  recommended for sciatica. Sciatica causes pain to radiate from the lower back or buttock area down the leg into the foot. Often there is a history of back problems. Most patients with sciatica are better after 2 to 4 weeks of rest and other supportive care. Short term bed rest can reduce the disk pressure considerably. Sitting, however, is not a good position since this increases the pressure on the disk. You should avoid bending, lifting, and all other activities which make the problem worse. Traction can be used in severe cases. Surgery is usually reserved for patients who do not improve within the first months of treatment.  Only take over-the-counter or prescription medicines for pain, discomfort, or fever as directed by your caregiver. Narcotics and muscle relaxants may help by relieving more severe pain and spasm and by providing mild sedation. Cold or massage can give significant relief. Spinal manipulation is not recommended. It can increase the degree of disc protrusion. Epidural steroid injections are often effective treatment for radicular pain. These injections deliver medicine to the spinal nerve in the space between the protective covering of the spinal cord and back bones (vertebrae). Your caregiver can give you more information about steroid injections. These injections are most effective when given within two weeks of the onset of pain.   You should see your caregiver for follow up care as recommended. A program for neck and back injury rehabilitation with stretching and strengthening exercises is an important part of management.   SEEK IMMEDIATE MEDICAL CARE IF:  · You develop increased pain, weakness, or numbness in your arm or leg.  · You develop difficulty with bladder or bowel control.  · You develop abdominal pain.     This information is not intended to replace advice given to you by your health care provider. Make sure you discuss any questions you have with your health care provider.        Document Released: 01/25/2006 Document Revised: 01/08/2016 Document Reviewed: 04/12/2010  [employer] Interactive Patient Education ©2016 [employer]    Migraine Headache  A migraine headache is an intense, throbbing pain on one or both sides of your head. A migraine can last for 30 minutes to several hours.  CAUSES   The exact cause of a migraine headache is not always known. However, a migraine may be caused when nerves in the brain become irritated and release chemicals that cause inflammation. This causes pain.  Certain things may also trigger migraines, such as:  · Alcohol.  · Smoking.  · Stress.  · Menstruation.  · Aged cheeses.  · Foods or drinks that contain nitrates, glutamate, aspartame, or tyramine.  · Lack of sleep.  · Chocolate.  · Caffeine.  · Hunger.  · Physical exertion.  · Fatigue.  · Medicines used to treat chest pain (nitroglycerine), birth control pills, estrogen, and some blood pressure medicines.  SIGNS AND SYMPTOMS  · Pain on one or both sides of your head.  · Pulsating or throbbing pain.  · Severe pain that prevents daily activities.  · Pain that is aggravated by any physical activity.  · Nausea, vomiting, or both.  · Dizziness.  · Pain with exposure to bright lights, loud noises, or activity.  · General sensitivity to bright lights, loud noises, or smells.  Before you get a migraine, you may get warning signs that a migraine is coming (aura). An aura may include:  · Seeing flashing lights.  · Seeing bright spots, halos, or zigzag lines.  · Having tunnel vision or blurred vision.  · Having feelings of numbness or tingling.  · Having trouble talking.  · Having muscle weakness.  DIAGNOSIS   A migraine headache is often diagnosed based on:  · Symptoms.  · Physical exam.  · A CT scan or MRI of your head. These imaging tests cannot diagnose migraines, but they can help rule out other causes of headaches.  TREATMENT  Medicines may be given for pain and nausea. Medicines can also be given to help  prevent recurrent migraines.   HOME CARE INSTRUCTIONS  · Only take over-the-counter or prescription medicines for pain or discomfort as directed by your health care provider. The use of long-term narcotics is not recommended.  · Lie down in a dark, quiet room when you have a migraine.  · Keep a journal to find out what may trigger your migraine headaches. For example, write down:  ¨ What you eat and drink.  ¨ How much sleep you get.  ¨ Any change to your diet or medicines.  · Limit alcohol consumption.  · Quit smoking if you smoke.  · Get 7-9 hours of sleep, or as recommended by your health care provider.  · Limit stress.  · Keep lights dim if bright lights bother you and make your migraines worse.  SEEK IMMEDIATE MEDICAL CARE IF:   · Your migraine becomes severe.  · You have a fever.  · You have a stiff neck.  · You have vision loss.  · You have muscular weakness or loss of muscle control.  · You start losing your balance or have trouble walking.  · You feel faint or pass out.  · You have severe symptoms that are different from your first symptoms.  MAKE SURE YOU:   · Understand these instructions.  · Will watch your condition.  · Will get help right away if you are not doing well or get worse.     This information is not intended to replace advice given to you by your health care provider. Make sure you discuss any questions you have with your health care provider.     Document Released: 12/18/2006 Document Revised: 01/08/2016 Document Reviewed: 08/25/2014  [employer] Interactive Patient Education ©2016 [employer]              Patient Information     Patient Information    Following emergency treatment: all patient requiring follow-up care must return either to a private physician or a clinic if your condition worsens before you are able to obtain further medical attention, please return to the emergency room.     Billing Information    At [facility], we work to make the billing process streamlined for our  patients.  Our Representatives are here to answer any questions you may have regarding your hospital bill.  If you have insurance coverage and have supplied your insurance information to us, we will submit a claim to your insurer on your behalf.  Should you have any questions regarding your bill, we can be reached online or by phone as follows:  Online: You are able pay your bills online or live chat with our representatives about any billing questions you may have. We are here to help Monday - Friday from 8:00am to 7:30pm and 9:00am - 12:00pm on Saturdays.  Please visit [email or web address]  for more information.   Phone:  [phone] or [phone]    Please note that your emergency physician, surgeon, pathologist, radiologist, anesthesiologist, and other specialists are not employed by [facility] and will therefore bill separately for their services.  Please contact them directly for any questions concerning their bills at the numbers below:     Emergency Physician Services:  [phone]  [facility]:  [phone]  [facility]:  [phone]  [facility]:  [phone]    1. Your final bill may vary from the amount quoted upon discharge if all procedures are not complete at that time, or if your doctor has additional procedures of which we are not aware. You will receive an additional bill if you return to the Emergency Department at [facility] for suture removal regardless of the facility of which the sutures were placed.     2. Please arrange for settlement of this account at the emergency registration.    3. All self-pay accounts are due in full at the time of treatment.  If you are unable to meet this obligation then payment is expected within 4-5 days.     4. If you have had radiology studies (CT, X-ray, Ultrasound, MRI), you have received a preliminary result during your emergency department visit. Please contact the  radiology department [phone] to receive a copy of your final result. Please discuss the Final result with your primary physician or with the follow up physician provided.     Crisis Hotline:  [place] Crisis Hotline:  4-988-VFAJLXB or 1-547.173.9829  [place] Crisis Hotline:    1-293.649.2698 or 129-088-8303         ED Discharge Follow Up Questions    1. In order to provide you with very good care, we would like to follow up with a phone call in the next few days.  May we have your permission to contact you?     YES /  NO    2. What is the best phone number to call you? (       )_____-__________    3. What is the best time to call you?      Morning  /  Afternoon  /  Evening                   Patient Signature:  ____________________________________________________________    Date:  ____________________________________________________________      Your appointments     Martin 15, 2017  2:00 PM   Follow Up Visit with Aimee Andrade M.D.   [facility] (--)    [address], [address]  [facility] [place] [id number] [phone]           You will be receiving a confirmation call a few days before your appointment from our automated call confirmation system.

## 2017-04-17 NOTE — ED NOTES
IV placed by ed tech labs collected and sent, pt medicated as ordered. Placed on bp and spo2 monitor. Call light in reach

## 2017-04-22 LAB — CRYOGLOB SER QL 3D COLD INC: NORMAL

## 2017-04-23 ENCOUNTER — HOSPITAL ENCOUNTER (EMERGENCY)
Facility: MEDICAL CENTER | Age: 47
End: 2017-04-23
Attending: EMERGENCY MEDICINE

## 2017-04-23 VITALS
HEIGHT: 63 IN | SYSTOLIC BLOOD PRESSURE: 118 MMHG | WEIGHT: 157.19 LBS | TEMPERATURE: 98.4 F | HEART RATE: 100 BPM | OXYGEN SATURATION: 98 % | RESPIRATION RATE: 18 BRPM | BODY MASS INDEX: 27.85 KG/M2 | DIASTOLIC BLOOD PRESSURE: 77 MMHG

## 2017-04-23 DIAGNOSIS — G89.29 CHRONIC LEFT SHOULDER PAIN: ICD-10-CM

## 2017-04-23 DIAGNOSIS — M25.512 CHRONIC LEFT SHOULDER PAIN: ICD-10-CM

## 2017-04-23 DIAGNOSIS — G43.709 CHRONIC MIGRAINE WITHOUT AURA WITHOUT STATUS MIGRAINOSUS, NOT INTRACTABLE: ICD-10-CM

## 2017-04-23 PROCEDURE — 700111 HCHG RX REV CODE 636 W/ 250 OVERRIDE (IP): Performed by: EMERGENCY MEDICINE

## 2017-04-23 PROCEDURE — 99283 EMERGENCY DEPT VISIT LOW MDM: CPT

## 2017-04-23 PROCEDURE — 96372 THER/PROPH/DIAG INJ SC/IM: CPT

## 2017-04-23 RX ORDER — ONDANSETRON 2 MG/ML
4 INJECTION INTRAMUSCULAR; INTRAVENOUS ONCE
Status: COMPLETED | OUTPATIENT
Start: 2017-04-23 | End: 2017-04-23

## 2017-04-23 RX ORDER — DIPHENHYDRAMINE HYDROCHLORIDE 50 MG/ML
25 INJECTION INTRAMUSCULAR; INTRAVENOUS ONCE
Status: DISCONTINUED | OUTPATIENT
Start: 2017-04-23 | End: 2017-04-23

## 2017-04-23 RX ORDER — KETOROLAC TROMETHAMINE 30 MG/ML
30 INJECTION, SOLUTION INTRAMUSCULAR; INTRAVENOUS ONCE
Status: COMPLETED | OUTPATIENT
Start: 2017-04-23 | End: 2017-04-23

## 2017-04-23 RX ORDER — DIPHENHYDRAMINE HYDROCHLORIDE 50 MG/ML
25 INJECTION INTRAMUSCULAR; INTRAVENOUS ONCE
Status: COMPLETED | OUTPATIENT
Start: 2017-04-23 | End: 2017-04-23

## 2017-04-23 RX ORDER — ONDANSETRON 2 MG/ML
4 INJECTION INTRAMUSCULAR; INTRAVENOUS ONCE
Status: DISCONTINUED | OUTPATIENT
Start: 2017-04-23 | End: 2017-04-23

## 2017-04-23 RX ADMIN — HYDROMORPHONE HYDROCHLORIDE 2 MG: 1 INJECTION, SOLUTION INTRAMUSCULAR; INTRAVENOUS; SUBCUTANEOUS at 21:14

## 2017-04-23 RX ADMIN — ONDANSETRON 4 MG: 2 INJECTION, SOLUTION INTRAMUSCULAR; INTRAVENOUS at 21:14

## 2017-04-23 RX ADMIN — DIPHENHYDRAMINE HYDROCHLORIDE 25 MG: 50 INJECTION, SOLUTION INTRAMUSCULAR; INTRAVENOUS at 21:14

## 2017-04-23 RX ADMIN — KETOROLAC TROMETHAMINE 30 MG: 30 INJECTION, SOLUTION INTRAMUSCULAR; INTRAVENOUS at 22:11

## 2017-04-23 ASSESSMENT — PAIN SCALES - GENERAL: PAINLEVEL_OUTOF10: 5

## 2017-04-23 NOTE — ED AVS SNAPSHOT
Arledia Access Code: Activation code not generated  Current Arledia Status: Active    Royal Winshart  A secure, online tool to manage your health information     [employer]’s Arledia® is a secure, online tool that connects you to your personalized health information from the privacy of your home -- day or night - making it very easy for you to manage your healthcare. Once the activation process is completed, you can even access your medical information using the Arledia brice, which is available for free in the Apple Brice store or Google Play store.     Arledia provides the following levels of access (as shown below):   My Chart Features   [facility] Primary Care Doctor [facility]  Specialists [facility]  Urgent  Care Non-[facility]  Primary Care  Doctor   Email your healthcare team securely and privately 24/7 X X X X   Manage appointments: schedule your next appointment; view details of past/upcoming appointments X      Request prescription refills. X      View recent personal medical records, including lab and immunizations X X X X   View health record, including health history, allergies, medications X X X X   Read reports about your outpatient visits, procedures, consult and ER notes X X X X   See your discharge summary, which is a recap of your hospital and/or ER visit that includes your diagnosis, lab results, and care plan. X X       How to register for Arledia:  1. Go to  [email or web address].  2. Click on the Sign Up Now box, which takes you to the New Member Sign Up page. You will need to provide the following information:  a. Enter your Arledia Access Code exactly as it appears at the top of this page. (You will not need to use this code after you’ve completed the sign-up process. If you do not sign up before the expiration date, you must request a new code.)   b. Enter your date of birth.   c. Enter your home email address.   d. Click Submit, and follow the next screen’s instructions.  3. Create a Arledia ID. This will  be your [employer] login ID and cannot be changed, so think of one that is secure and easy to remember.  4. Create a [employer] password. You can change your password at any time.  5. Enter your Password Reset Question and Answer. This can be used at a later time if you forget your password.   6. Enter your e-mail address. This allows you to receive e-mail notifications when new information is available in Comr[employer]se.  7. Click Sign Up. You can now view your health information.    For assistance activating your Com[employer]se account, call [phone]

## 2017-04-23 NOTE — ED AVS SNAPSHOT
Home Care Instructions                                                                                                                Enedelia Pang   MRN: [id number]    Department:  [facility], Emergency Dept   Date of Visit:  4/23/2017            [facility], Emergency Dept    [address]    [place] [address]    Phone:  [phone]      You were seen by     Elliott Beltrán M.D.      Your Diagnosis Was     Chronic migraine without aura without status migrainosus, not intractable     G43.709       These are the medications you received during your hospitalization from 04/23/2017 1933 to 04/23/2017 2314     Date/Time Order Dose Route Action    04/23/2017 2114 ondansetron (ZOFRAN) syringe/vial injection 4 mg 4 mg Intramuscular Given    04/23/2017 2114 HYDROmorphone (DILAUDID) injection 2 mg 2 mg Intramuscular Given    04/23/2017 2114 diphenhydrAMINE (BENADRYL) injection 25 mg 25 mg Intramuscular Given    04/23/2017 2211 ketorolac (TORADOL) inj (For IM use) 30 mg Intramuscular Given      Follow-up Information     1. Follow up with Jesús Bonilla M.D.. Call in 1 day.    Specialty:  Orthopaedics    Contact information    [address] [facility] [address]  [phone]        Medication Information     Review all of your home medications and newly ordered medications with your primary doctor and/or pharmacist as soon as possible. Follow medication instructions as directed by your doctor and/or pharmacist.     Please keep your complete medication list with you and share with your physician. Update the information when medications are discontinued, doses are changed, or new medications (including over-the-counter products) are added; and carry medication information at all times in the event of emergency situations.               Medication List      ASK your doctor about these medications        Instructions    Morning Afternoon Evening Bedtime    acetaZOLAMIDE  MG  Cp12   Commonly known as:  DIAMOX        Take 500 mg by mouth 2 times a day.   Dose:  500 mg                        calcium carbonate 500 MG Tabs   Commonly known as:  OS-ZAIN 500        Take 1 Tab by mouth 2 times a day, with meals.   Dose:  500 mg                        cephALEXin 500 MG Caps   Commonly known as:  KEFLEX        Take 500 mg by mouth 4 times a day. 5 day course, finished 12/24/16   Dose:  500 mg                        Cholecalciferol 1000 UNIT Tabs   Commonly known as:  VITAMIND D3        Take 1 Tab by mouth every day.   Dose:  1000 Units                        cyclobenzaprine 10 MG Tabs   Commonly known as:  FLEXERIL        Take 1 Tab by mouth 3 times a day as needed for Muscle Spasms.   Dose:  10 mg                        * diphenhydrAMINE 50 MG tablet   Commonly known as:  BENADRYL        Take 1 Tab by mouth every 6 hours as needed (Headache, give with toradol).   Dose:  50 mg                        * diphenhydrAMINE 50 MG/ML Soln   Last time this was given:  25 mg on 4/23/2017  9:14 PM   Commonly known as:  BENADRYL        INJECT IM EVERY 6 HOURS AS NEEDED FOR ACUTE, SEVERE MIGRAINE HEADACHE AS DIRECTED                        duloxetine 60 MG Cpep delayed-release capsule   Commonly known as:  CYMBALTA        Take 60 mg by mouth 2 times a day.   Dose:  60 mg                        ferrous gluconate 324 (38 FE) MG Tabs   Commonly known as:  FERGON        Take 1 Tab by mouth 2 times a day, with meals.   Dose:  324 mg                        FEVERFEW PO        Take  by mouth.                        folic acid 1 MG Tabs   Commonly known as:  FOLVITE        Take 1 Tab by mouth every day.   Dose:  1 mg                        gabapentin 600 MG tablet   Commonly known as:  NEURONTIN        Take 600 mg by mouth 3 times a day.   Dose:  600 mg                        ketorolac 60 MG/2ML Soln   Commonly known as:  TORADOL        60 mg by Intramuscular route Once PRN (migraine).   Dose:  60 mg                           MAGNESIUM OXIDE PO        Take 1 Tab by mouth every day.   Dose:  1 Tab                        methotrexate 2.5 MG Tabs        Take 5 Tabs by mouth every 7 days.   Dose:  12.5 mg                        MULTIVITAMIN ADULT PO        Take 1 Tab by mouth every day.   Dose:  1 Tab                        omeprazole 20 MG delayed-release capsule   Commonly known as:  PRILOSEC        Take 1 Cap by mouth every day.   Dose:  20 mg                        * oxycodone immediate release 10 MG immediate release tablet   Commonly known as:  ROXICODONE        Take 1 Tab by mouth every four hours as needed for Moderate Pain.   Dose:  10 mg                        * oxycodone immediate release 10 MG immediate release tablet   Commonly known as:  ROXICODONE        Take 1 Tab by mouth every four hours as needed for Moderate Pain.   Dose:  10 mg                        predniSONE 5 MG Tabs   Commonly known as:  DELTASONE        30 mg po q day x 7 days decrease by 5 mg every week until 10 mg po q day then stay at this dose until seen.                        sulfaSALAzine 500 MG Tabs   Commonly known as:  AZULFIDINE        Take 2 Tabs by mouth 2 times a day.   Dose:  1000 mg                        VITAMIN B 12 PO        Take 1 Tab by mouth every day.   Dose:  1 Tab                        * Notice:  This list has 4 medication(s) that are the same as other medications prescribed for you. Read the directions carefully, and ask your doctor or other care provider to review them with you.              Discharge Instructions       Chronic Pain  Chronic pain can be defined as pain that is off and on and lasts for 3-6 months or longer. Many things cause chronic pain, which can make it difficult to make a diagnosis. There are many treatment options available for chronic pain. However, finding a treatment that works well for you may require trying various approaches until the right one is found. Many people benefit from a combination of two or  more types of treatment to control their pain.  SYMPTOMS   Chronic pain can occur anywhere in the body and can range from mild to very severe. Some types of chronic pain include:  · Headache.  · Low back pain.  · Cancer pain.  · Arthritis pain.  · Neurogenic pain. This is pain resulting from damage to nerves.   People with chronic pain may also have other symptoms such as:  · Depression.  · Anger.  · Insomnia.  · Anxiety.  DIAGNOSIS   Your health care provider will help diagnose your condition over time. In many cases, the initial focus will be on excluding possible conditions that could be causing the pain. Depending on your symptoms, your health care provider may order tests to diagnose your condition. Some of these tests may include:   · Blood tests.    · CT scan.    · MRI.    · X-rays.    · Ultrasounds.    · Nerve conduction studies.    You may need to see a specialist.   TREATMENT   Finding treatment that works well may take time. You may be referred to a pain specialist. He or she may prescribe medicine or therapies, such as:   · Mindful meditation or yoga.  · Shots (injections) of numbing or pain-relieving medicines into the spine or area of pain.  · Local electrical stimulation.  · Acupuncture.    · Massage therapy.    · Aroma, color, light, or sound therapy.    · Biofeedback.    · Working with a physical therapist to keep from getting stiff.    · Regular, gentle exercise.    · Cognitive or behavioral therapy.    · Group support.    Sometimes, surgery may be recommended.   HOME CARE INSTRUCTIONS   · Take all medicines as directed by your health care provider.    · Lessen stress in your life by relaxing and doing things such as listening to calming music.    · Exercise or be active as directed by your health care provider.    · Eat a healthy diet and include things such as vegetables, fruits, fish, and lean meats in your diet.    · Keep all follow-up appointments with your health care provider.    · Attend a  support group with others suffering from chronic pain.  SEEK MEDICAL CARE IF:   · Your pain gets worse.    · You develop a new pain that was not there before.    · You cannot tolerate medicines given to you by your health care provider.    · You have new symptoms since your last visit with your health care provider.    SEEK IMMEDIATE MEDICAL CARE IF:   · You feel weak.    · You have decreased sensation or numbness.    · You lose control of bowel or bladder function.    · Your pain suddenly gets much worse.    · You develop shaking.  · You develop chills.  · You develop confusion.  · You develop chest pain.  · You develop shortness of breath.    MAKE SURE YOU:  · Understand these instructions.  · Will watch your condition.  · Will get help right away if you are not doing well or get worse.     This information is not intended to replace advice given to you by your health care provider. Make sure you discuss any questions you have with your health care provider.     Document Released: 09/09/2003 Document Revised: 08/20/2014 Document Reviewed: 06/13/2014  [employer] Interactive Patient Education ©2016 [employer]            Patient Information     Patient Information    Following emergency treatment: all patient requiring follow-up care must return either to a private physician or a clinic if your condition worsens before you are able to obtain further medical attention, please return to the emergency room.     Billing Information    At [facility], we work to make the billing process streamlined for our patients.  Our Representatives are here to answer any questions you may have regarding your hospital bill.  If you have insurance coverage and have supplied your insurance information to us, we will submit a claim to your insurer on your behalf.  Should you have any questions regarding your bill, we can be reached online or by phone as follows:  Online: You are able pay your bills online or live chat with our  representatives about any billing questions you may have. We are here to help Monday - Friday from 8:00am to 7:30pm and 9:00am - 12:00pm on Saturdays.  Please visit [email or web address]  for more information.   Phone:  [phone] or [phone]    Please note that your emergency physician, surgeon, pathologist, radiologist, anesthesiologist, and other specialists are not employed by [facility] and will therefore bill separately for their services.  Please contact them directly for any questions concerning their bills at the numbers below:     Emergency Physician Services:  [phone]  [facility]:  [phone]  [facility]:  [phone]  [facility]:  [phone]    1. Your final bill may vary from the amount quoted upon discharge if all procedures are not complete at that time, or if your doctor has additional procedures of which we are not aware. You will receive an additional bill if you return to the Emergency Department at [facility] for suture removal regardless of the facility of which the sutures were placed.     2. Please arrange for settlement of this account at the emergency registration.    3. All self-pay accounts are due in full at the time of treatment.  If you are unable to meet this obligation then payment is expected within 4-5 days.     4. If you have had radiology studies (CT, X-ray, Ultrasound, MRI), you have received a preliminary result during your emergency department visit. Please contact the radiology department [phone] to receive a copy of your final result. Please discuss the Final result with your primary physician or with the follow up physician provided.     Crisis Hotline:  [place] Crisis Hotline:  9-302-YFFDMVL or 1-347.702.8461  [place] Crisis Hotline:    1-170.651.4763 or 199-535-0307         ED Discharge Follow Up Questions    1. In order to provide you with very good care, we would  like to follow up with a phone call in the next few days.  May we have your permission to contact you?     YES /  NO    2. What is the best phone number to call you? (       )_____-__________    3. What is the best time to call you?      Morning  /  Afternoon  /  Evening                   Patient Signature:  ____________________________________________________________    Date:  ____________________________________________________________      Your appointments     Martin 15, 2017  2:00 PM   Follow Up Visit with Aimee Andrade M.D.   [facility] (--)    [address] [place] [address], [address] [id number] [phone]           You will be receiving a confirmation call a few days before your appointment from our automated call confirmation system.

## 2017-04-23 NOTE — ED AVS SNAPSHOT
4/23/2017    Enedelia Pang  [address] [place] [address]    Dear Enedelia:    [facility] wants to ensure your discharge home is safe and you or your loved ones have had all of your questions answered regarding your care after you leave the hospital.    Below is a list of resources and contact information should you have any questions regarding your hospital stay, follow-up instructions, or active medical symptoms.    Questions or Concerns Regarding… Contact   Medical Questions Related to Your Discharge  (7 days a week, 8am-5pm) Contact a Nurse Care Coordinator   [phone]   Medical Questions Not Related to Your Discharge  (24 hours a day / 7 days a week)  Contact the Nurse Health Line   [phone]    Medications or Discharge Instructions Refer to your discharge packet   or contact your [facility] Primary Care Provider   [phone]   Follow-up Appointment(s) Schedule your appointment via [employer]   or contact Scheduling [phone]   Billing Review your statement via Integration Management  or contact Billing [phone]   Medical Records Review your records via Integration Management   or contact Medical Records [phone]     You may receive a telephone call within two days of discharge. This call is to make certain you understand your discharge instructions and have the opportunity to have any questions answered. You can also easily access your medical information, test results and upcoming appointments via the [employer] free online health management tool. You can learn more and sign up at [email or web address])/[email or web address] Management. For assistance setting up your Integration Management account, please call [phone].    Once again, we want to ensure your discharge home is safe and that you have a clear understanding of any next steps in your care. If you have any questions or concerns, please do not hesitate to contact us, we are here for you. Thank you for choosing [facility] for your healthcare needs.    Sincerely,    Your [facility] Healthcare Team

## 2017-04-24 NOTE — ED NOTES
Chief Complaint   Patient presents with   • Shoulder Pain   • Migraine     Patient ambulatory to triage for above complaints. Patient seen here on the 17th of this month. Patient states that she is unable to get into the pain clinic for another two weeks.

## 2017-04-24 NOTE — ED PROVIDER NOTES
ED Provider Note    HPI:  Patient is a 46-year-old female who presented to the emergency department April 23, 2017 at 7:33 PM with a chief complaint of chronic shoulder pain and migraine headache.    Patient has a long history of pain problems. She states her primary is attempting to get her into see a pain specialist. She reports a migraine headache and left shoulder pain with no trauma. She was seen here several days ago with similar complaints. No chest pain or shortness of breath no abdominal pain no change in bladder or bowel habits no fever no chills. Patient did not perceive her headache to be anything other than her usual migraine. No other somatic complaints    Review of Systems: Positive for migraine headache left shoulder pain negative for fever or chills change in bladder or bowel habits abdominal pain chest pain shortness of breath. Review of systems reviewed with patient, all other systems negative    Past medical/surgical history: Migraine headache hydrocephalus MRSA infection pituitary abnormality anxiety depression CVA cholecystectomy appendectomy spinal cord stimulator    Medications: methotrexate Azulfidine prednisone Flexeril Prilosec Neurontin Toradol     Allergies: Prochlorperazine sumatriptan vancomycin    Social History:  No history of smoking or alcohol use      Physical exam: Constitutional: Pleasant female appeared uncomfortable  Vital signs: Temperature 97.4 pulse 112 respirations 16 blood pressure 139/58 pulse oximetry 98%  EYES: PERRL, EOMI, Conjunctivae and sclera normal, eyelids normal bilaterally.  Neck: Trachea midline. No cervical masses seen or palpated. Normal range of motion, supple. No meningeal signs elicited.  Cardiac: Regular rate and rhythm. S1-S2 present. No S3 or S4 present. No murmurs, rubs, or gallops heard. No edema or varicosities were seen.   Lungs: Clear to auscultation with good aeration throughout. No wheezes, rales, or rhonchi heard. Patient's chest wall moved  symmetrically with each respiratory effort. Patient was not making use of accessory muscles of respiration in breathing.  Abdomen: Soft nontender to palpation. No rebound or guarding elicited. No organomegaly identified. No pulsatile abdominal masses identified.   Musculoskeletal:  no  pain with palpitation or movement of muscle, bone or joint , no obvious musculoskeletal deformities identified.  Neurologic: alert and awake answers questions appropriately. Moves all four extremities independently, no gross focal abnormalities identified. Normal strength and motor.  Skin: no rash or lesion seen, no palpable dermatologic lesions identified.  Psychiatric: Patient appeared to be somewhat anxious but not appropriately so. She was not delusional or hallucinating.    Medical decision making:  IV started patient given 1 mg of hydromorphone 4 mg Zofran and 25 mg of diphenhydramine.    Drug database reviewed. The patient is still number of prescriptions from various providers for controlled substances mostly narcotics. I suspect this is a chronic pain patient having trouble establishing continuity of care. Inappropriate utilization of however is not ruled out.    I told the patient I can't write her for any oral pain medications as we do not do pain medication refills in the emergency department. The patient understood this..    Patient given IM hydromorphone with good effect on her headache but she still had some shoulder pain. Patient given an IM dose of ketorolac. She'll be referred to follow up with orthopedics for her chronic shoulder pain. Patient will take her medications at home. She'll follow up with her pain specialist as possible    Impression 1) migraine headache.  2)  chronic shoulder pain  #3) anxiety

## 2017-04-24 NOTE — DISCHARGE INSTRUCTIONS
Chronic Pain  Chronic pain can be defined as pain that is off and on and lasts for 3-6 months or longer. Many things cause chronic pain, which can make it difficult to make a diagnosis. There are many treatment options available for chronic pain. However, finding a treatment that works well for you may require trying various approaches until the right one is found. Many people benefit from a combination of two or more types of treatment to control their pain.  SYMPTOMS   Chronic pain can occur anywhere in the body and can range from mild to very severe. Some types of chronic pain include:  · Headache.  · Low back pain.  · Cancer pain.  · Arthritis pain.  · Neurogenic pain. This is pain resulting from damage to nerves.   People with chronic pain may also have other symptoms such as:  · Depression.  · Anger.  · Insomnia.  · Anxiety.  DIAGNOSIS   Your health care provider will help diagnose your condition over time. In many cases, the initial focus will be on excluding possible conditions that could be causing the pain. Depending on your symptoms, your health care provider may order tests to diagnose your condition. Some of these tests may include:   · Blood tests.    · CT scan.    · MRI.    · X-rays.    · Ultrasounds.    · Nerve conduction studies.    You may need to see a specialist.   TREATMENT   Finding treatment that works well may take time. You may be referred to a pain specialist. He or she may prescribe medicine or therapies, such as:   · Mindful meditation or yoga.  · Shots (injections) of numbing or pain-relieving medicines into the spine or area of pain.  · Local electrical stimulation.  · Acupuncture.    · Massage therapy.    · Aroma, color, light, or sound therapy.    · Biofeedback.    · Working with a physical therapist to keep from getting stiff.    · Regular, gentle exercise.    · Cognitive or behavioral therapy.    · Group support.    Sometimes, surgery may be recommended.   HOME CARE INSTRUCTIONS    · Take all medicines as directed by your health care provider.    · Lessen stress in your life by relaxing and doing things such as listening to calming music.    · Exercise or be active as directed by your health care provider.    · Eat a healthy diet and include things such as vegetables, fruits, fish, and lean meats in your diet.    · Keep all follow-up appointments with your health care provider.    · Attend a support group with others suffering from chronic pain.  SEEK MEDICAL CARE IF:   · Your pain gets worse.    · You develop a new pain that was not there before.    · You cannot tolerate medicines given to you by your health care provider.    · You have new symptoms since your last visit with your health care provider.    SEEK IMMEDIATE MEDICAL CARE IF:   · You feel weak.    · You have decreased sensation or numbness.    · You lose control of bowel or bladder function.    · Your pain suddenly gets much worse.    · You develop shaking.  · You develop chills.  · You develop confusion.  · You develop chest pain.  · You develop shortness of breath.    MAKE SURE YOU:  · Understand these instructions.  · Will watch your condition.  · Will get help right away if you are not doing well or get worse.     This information is not intended to replace advice given to you by your health care provider. Make sure you discuss any questions you have with your health care provider.     Document Released: 09/09/2003 Document Revised: 08/20/2014 Document Reviewed: 06/13/2014  [employer] Interactive Patient Education ©2016 [employer]

## 2017-04-24 NOTE — ED NOTES
Discharge instructions provided, pt verbalizes understanding. Pt awake, alert, VSS. Pt ambulatory with steady gait out of ER with family.

## 2017-04-29 ENCOUNTER — APPOINTMENT (OUTPATIENT)
Dept: RADIOLOGY | Facility: MEDICAL CENTER | Age: 47
End: 2017-04-29
Attending: EMERGENCY MEDICINE

## 2017-04-29 ENCOUNTER — HOSPITAL ENCOUNTER (OUTPATIENT)
Facility: MEDICAL CENTER | Age: 47
End: 2017-05-05
Attending: EMERGENCY MEDICINE | Admitting: HOSPITALIST

## 2017-04-29 ENCOUNTER — RESOLUTE PROFESSIONAL BILLING HOSPITAL PROF FEE (OUTPATIENT)
Dept: HOSPITALIST | Facility: MEDICAL CENTER | Age: 47
End: 2017-04-29

## 2017-04-29 DIAGNOSIS — R07.9 CHEST PAIN, UNSPECIFIED TYPE: ICD-10-CM

## 2017-04-29 DIAGNOSIS — J34.89 NASAL SEPTAL PERFORATION: ICD-10-CM

## 2017-04-29 DIAGNOSIS — R00.0 TACHYCARDIA: ICD-10-CM

## 2017-04-29 LAB
ALBUMIN SERPL BCP-MCNC: 3.9 G/DL (ref 3.2–4.9)
ALBUMIN/GLOB SERPL: 1.6 G/DL
ALP SERPL-CCNC: 77 U/L (ref 30–99)
ALT SERPL-CCNC: 37 U/L (ref 2–50)
ANION GAP SERPL CALC-SCNC: 9 MMOL/L (ref 0–11.9)
APTT PPP: 25.2 SEC (ref 24.7–36)
AST SERPL-CCNC: 15 U/L (ref 12–45)
BASOPHILS # BLD AUTO: 0.3 % (ref 0–1.8)
BASOPHILS # BLD: 0.03 K/UL (ref 0–0.12)
BILIRUB SERPL-MCNC: 0.4 MG/DL (ref 0.1–1.5)
BUN SERPL-MCNC: 13 MG/DL (ref 8–22)
CALCIUM SERPL-MCNC: 8.4 MG/DL (ref 8.5–10.5)
CHLORIDE SERPL-SCNC: 115 MMOL/L (ref 96–112)
CO2 SERPL-SCNC: 16 MMOL/L (ref 20–33)
CREAT SERPL-MCNC: 0.65 MG/DL (ref 0.5–1.4)
EKG IMPRESSION: NORMAL
EOSINOPHIL # BLD AUTO: 0 K/UL (ref 0–0.51)
EOSINOPHIL NFR BLD: 0 % (ref 0–6.9)
ERYTHROCYTE [DISTWIDTH] IN BLOOD BY AUTOMATED COUNT: 44.1 FL (ref 35.9–50)
EST. AVERAGE GLUCOSE BLD GHB EST-MCNC: 103 MG/DL
GFR SERPL CREATININE-BSD FRML MDRD: >60 ML/MIN/1.73 M 2
GLOBULIN SER CALC-MCNC: 2.4 G/DL (ref 1.9–3.5)
GLUCOSE SERPL-MCNC: 93 MG/DL (ref 65–99)
HBA1C MFR BLD: 5.2 % (ref 0–5.6)
HCG SERPL QL: NEGATIVE
HCT VFR BLD AUTO: 40.3 % (ref 37–47)
HGB BLD-MCNC: 13.9 G/DL (ref 12–16)
IMM GRANULOCYTES # BLD AUTO: 0.04 K/UL (ref 0–0.11)
IMM GRANULOCYTES NFR BLD AUTO: 0.4 % (ref 0–0.9)
INR PPP: 1.06 (ref 0.87–1.13)
LACTATE BLD-SCNC: 1.3 MMOL/L (ref 0.5–2)
LACTATE BLD-SCNC: 1.4 MMOL/L (ref 0.5–2)
LYMPHOCYTES # BLD AUTO: 1.03 K/UL (ref 1–4.8)
LYMPHOCYTES NFR BLD: 10.7 % (ref 22–41)
MAGNESIUM SERPL-MCNC: 1.9 MG/DL (ref 1.5–2.5)
MCH RBC QN AUTO: 32.3 PG (ref 27–33)
MCHC RBC AUTO-ENTMCNC: 34.5 G/DL (ref 33.6–35)
MCV RBC AUTO: 93.7 FL (ref 81.4–97.8)
MONOCYTES # BLD AUTO: 0.23 K/UL (ref 0–0.85)
MONOCYTES NFR BLD AUTO: 2.4 % (ref 0–13.4)
NEUTROPHILS # BLD AUTO: 8.28 K/UL (ref 2–7.15)
NEUTROPHILS NFR BLD: 86.2 % (ref 44–72)
NRBC # BLD AUTO: 0 K/UL
NRBC BLD AUTO-RTO: 0 /100 WBC
PLATELET # BLD AUTO: 362 K/UL (ref 164–446)
PMV BLD AUTO: 9.4 FL (ref 9–12.9)
POTASSIUM SERPL-SCNC: 3.4 MMOL/L (ref 3.6–5.5)
PROT SERPL-MCNC: 6.3 G/DL (ref 6–8.2)
PROTHROMBIN TIME: 14.1 SEC (ref 12–14.6)
RBC # BLD AUTO: 4.3 M/UL (ref 4.2–5.4)
SODIUM SERPL-SCNC: 140 MMOL/L (ref 135–145)
TROPONIN I SERPL-MCNC: <0.01 NG/ML (ref 0–0.04)
TROPONIN I SERPL-MCNC: <0.01 NG/ML (ref 0–0.04)
TSH SERPL DL<=0.005 MIU/L-ACNC: 1.26 UIU/ML (ref 0.3–3.7)
WBC # BLD AUTO: 9.6 K/UL (ref 4.8–10.8)

## 2017-04-29 PROCEDURE — 85610 PROTHROMBIN TIME: CPT

## 2017-04-29 PROCEDURE — 87040 BLOOD CULTURE FOR BACTERIA: CPT

## 2017-04-29 PROCEDURE — 302151 K-PAD 14X20: Performed by: HOSPITALIST

## 2017-04-29 PROCEDURE — 93005 ELECTROCARDIOGRAM TRACING: CPT | Performed by: HOSPITALIST

## 2017-04-29 PROCEDURE — 93005 ELECTROCARDIOGRAM TRACING: CPT

## 2017-04-29 PROCEDURE — 96376 TX/PRO/DX INJ SAME DRUG ADON: CPT

## 2017-04-29 PROCEDURE — 83036 HEMOGLOBIN GLYCOSYLATED A1C: CPT

## 2017-04-29 PROCEDURE — 83605 ASSAY OF LACTIC ACID: CPT

## 2017-04-29 PROCEDURE — 700111 HCHG RX REV CODE 636 W/ 250 OVERRIDE (IP): Performed by: EMERGENCY MEDICINE

## 2017-04-29 PROCEDURE — 700105 HCHG RX REV CODE 258: Performed by: EMERGENCY MEDICINE

## 2017-04-29 PROCEDURE — 96361 HYDRATE IV INFUSION ADD-ON: CPT

## 2017-04-29 PROCEDURE — 700111 HCHG RX REV CODE 636 W/ 250 OVERRIDE (IP): Performed by: HOSPITALIST

## 2017-04-29 PROCEDURE — 99220 PR INITIAL OBSERVATION CARE,LEVL III: CPT | Performed by: HOSPITALIST

## 2017-04-29 PROCEDURE — G0378 HOSPITAL OBSERVATION PER HR: HCPCS

## 2017-04-29 PROCEDURE — 93010 ELECTROCARDIOGRAM REPORT: CPT | Performed by: INTERNAL MEDICINE

## 2017-04-29 PROCEDURE — 84484 ASSAY OF TROPONIN QUANT: CPT | Mod: 91

## 2017-04-29 PROCEDURE — 700102 HCHG RX REV CODE 250 W/ 637 OVERRIDE(OP): Performed by: HOSPITALIST

## 2017-04-29 PROCEDURE — 85730 THROMBOPLASTIN TIME PARTIAL: CPT

## 2017-04-29 PROCEDURE — A9270 NON-COVERED ITEM OR SERVICE: HCPCS | Performed by: HOSPITALIST

## 2017-04-29 PROCEDURE — 84703 CHORIONIC GONADOTROPIN ASSAY: CPT

## 2017-04-29 PROCEDURE — 99285 EMERGENCY DEPT VISIT HI MDM: CPT

## 2017-04-29 PROCEDURE — 80053 COMPREHEN METABOLIC PANEL: CPT

## 2017-04-29 PROCEDURE — 71010 DX-CHEST-PORTABLE (1 VIEW): CPT

## 2017-04-29 PROCEDURE — 71275 CT ANGIOGRAPHY CHEST: CPT

## 2017-04-29 PROCEDURE — 80061 LIPID PANEL: CPT

## 2017-04-29 PROCEDURE — 36415 COLL VENOUS BLD VENIPUNCTURE: CPT

## 2017-04-29 PROCEDURE — 96374 THER/PROPH/DIAG INJ IV PUSH: CPT

## 2017-04-29 PROCEDURE — 96372 THER/PROPH/DIAG INJ SC/IM: CPT

## 2017-04-29 PROCEDURE — 700105 HCHG RX REV CODE 258: Performed by: HOSPITALIST

## 2017-04-29 PROCEDURE — 96375 TX/PRO/DX INJ NEW DRUG ADDON: CPT

## 2017-04-29 PROCEDURE — 700117 HCHG RX CONTRAST REV CODE 255: Performed by: EMERGENCY MEDICINE

## 2017-04-29 PROCEDURE — 700111 HCHG RX REV CODE 636 W/ 250 OVERRIDE (IP): Performed by: NURSE PRACTITIONER

## 2017-04-29 PROCEDURE — 84443 ASSAY THYROID STIM HORMONE: CPT

## 2017-04-29 PROCEDURE — 85025 COMPLETE CBC W/AUTO DIFF WBC: CPT

## 2017-04-29 PROCEDURE — 83735 ASSAY OF MAGNESIUM: CPT

## 2017-04-29 RX ORDER — ONDANSETRON 2 MG/ML
4 INJECTION INTRAMUSCULAR; INTRAVENOUS ONCE
Status: COMPLETED | OUTPATIENT
Start: 2017-04-29 | End: 2017-04-29

## 2017-04-29 RX ORDER — ACETAMINOPHEN 325 MG/1
650 TABLET ORAL EVERY 6 HOURS PRN
Status: DISCONTINUED | OUTPATIENT
Start: 2017-04-29 | End: 2017-05-05 | Stop reason: HOSPADM

## 2017-04-29 RX ORDER — MORPHINE SULFATE 4 MG/ML
4 INJECTION, SOLUTION INTRAMUSCULAR; INTRAVENOUS ONCE
Status: COMPLETED | OUTPATIENT
Start: 2017-04-29 | End: 2017-04-29

## 2017-04-29 RX ORDER — ONDANSETRON 2 MG/ML
4 INJECTION INTRAMUSCULAR; INTRAVENOUS EVERY 4 HOURS PRN
Status: DISCONTINUED | OUTPATIENT
Start: 2017-04-29 | End: 2017-05-05 | Stop reason: HOSPADM

## 2017-04-29 RX ORDER — OMEPRAZOLE 20 MG/1
20 CAPSULE, DELAYED RELEASE ORAL DAILY
Status: DISCONTINUED | OUTPATIENT
Start: 2017-04-30 | End: 2017-05-05 | Stop reason: HOSPADM

## 2017-04-29 RX ORDER — GABAPENTIN 300 MG/1
600 CAPSULE ORAL 2 TIMES DAILY
Status: DISCONTINUED | OUTPATIENT
Start: 2017-04-29 | End: 2017-05-05 | Stop reason: HOSPADM

## 2017-04-29 RX ORDER — ASPIRIN 81 MG/1
324 TABLET, CHEWABLE ORAL DAILY
Status: DISCONTINUED | OUTPATIENT
Start: 2017-04-29 | End: 2017-05-01

## 2017-04-29 RX ORDER — SODIUM CHLORIDE 9 MG/ML
2000 INJECTION, SOLUTION INTRAVENOUS ONCE
Status: COMPLETED | OUTPATIENT
Start: 2017-04-29 | End: 2017-04-29

## 2017-04-29 RX ORDER — PROMETHAZINE HYDROCHLORIDE 25 MG/1
12.5-25 SUPPOSITORY RECTAL EVERY 4 HOURS PRN
Status: DISCONTINUED | OUTPATIENT
Start: 2017-04-29 | End: 2017-05-05 | Stop reason: HOSPADM

## 2017-04-29 RX ORDER — LABETALOL HYDROCHLORIDE 5 MG/ML
10 INJECTION, SOLUTION INTRAVENOUS EVERY 4 HOURS PRN
Status: DISCONTINUED | OUTPATIENT
Start: 2017-04-29 | End: 2017-05-05 | Stop reason: HOSPADM

## 2017-04-29 RX ORDER — PREDNISONE 5 MG/1
15 TABLET ORAL DAILY
COMMUNITY
End: 2017-05-10

## 2017-04-29 RX ORDER — PROMETHAZINE HYDROCHLORIDE 25 MG/1
12.5-25 TABLET ORAL EVERY 4 HOURS PRN
Status: DISCONTINUED | OUTPATIENT
Start: 2017-04-29 | End: 2017-05-05 | Stop reason: HOSPADM

## 2017-04-29 RX ORDER — DIPHENHYDRAMINE HYDROCHLORIDE 50 MG/ML
12.5 INJECTION INTRAMUSCULAR; INTRAVENOUS EVERY 6 HOURS PRN
Status: DISCONTINUED | OUTPATIENT
Start: 2017-04-29 | End: 2017-05-02

## 2017-04-29 RX ORDER — KETOROLAC TROMETHAMINE 30 MG/ML
30 INJECTION, SOLUTION INTRAMUSCULAR; INTRAVENOUS
Status: DISCONTINUED | OUTPATIENT
Start: 2017-04-29 | End: 2017-04-29

## 2017-04-29 RX ORDER — SODIUM CHLORIDE 9 MG/ML
INJECTION, SOLUTION INTRAVENOUS CONTINUOUS
Status: DISCONTINUED | OUTPATIENT
Start: 2017-04-29 | End: 2017-04-29

## 2017-04-29 RX ORDER — BISACODYL 10 MG
10 SUPPOSITORY, RECTAL RECTAL
Status: DISCONTINUED | OUTPATIENT
Start: 2017-04-29 | End: 2017-05-05 | Stop reason: HOSPADM

## 2017-04-29 RX ORDER — ASPIRIN 325 MG
325 TABLET ORAL DAILY
Status: DISCONTINUED | OUTPATIENT
Start: 2017-04-29 | End: 2017-05-01

## 2017-04-29 RX ORDER — ONDANSETRON 4 MG/1
4 TABLET, ORALLY DISINTEGRATING ORAL EVERY 4 HOURS PRN
Status: DISCONTINUED | OUTPATIENT
Start: 2017-04-29 | End: 2017-05-05 | Stop reason: HOSPADM

## 2017-04-29 RX ORDER — HEPARIN SODIUM 5000 [USP'U]/ML
5000 INJECTION, SOLUTION INTRAVENOUS; SUBCUTANEOUS EVERY 8 HOURS
Status: DISCONTINUED | OUTPATIENT
Start: 2017-04-29 | End: 2017-05-05 | Stop reason: HOSPADM

## 2017-04-29 RX ORDER — MORPHINE SULFATE 4 MG/ML
2 INJECTION, SOLUTION INTRAMUSCULAR; INTRAVENOUS EVERY 4 HOURS PRN
Status: DISCONTINUED | OUTPATIENT
Start: 2017-04-29 | End: 2017-04-30

## 2017-04-29 RX ORDER — DULOXETIN HYDROCHLORIDE 30 MG/1
60 CAPSULE, DELAYED RELEASE ORAL 2 TIMES DAILY
Status: DISCONTINUED | OUTPATIENT
Start: 2017-04-29 | End: 2017-05-05 | Stop reason: HOSPADM

## 2017-04-29 RX ORDER — SULFASALAZINE 500 MG/1
1000 TABLET ORAL 2 TIMES DAILY
Status: DISCONTINUED | OUTPATIENT
Start: 2017-04-29 | End: 2017-05-05 | Stop reason: HOSPADM

## 2017-04-29 RX ORDER — POLYETHYLENE GLYCOL 3350 17 G/17G
1 POWDER, FOR SOLUTION ORAL
Status: DISCONTINUED | OUTPATIENT
Start: 2017-04-29 | End: 2017-05-05 | Stop reason: HOSPADM

## 2017-04-29 RX ORDER — ZOLPIDEM TARTRATE 5 MG/1
5 TABLET ORAL
Status: DISCONTINUED | OUTPATIENT
Start: 2017-04-29 | End: 2017-05-05 | Stop reason: HOSPADM

## 2017-04-29 RX ORDER — FERROUS GLUCONATE 324(38)MG
324 TABLET ORAL 2 TIMES DAILY WITH MEALS
Status: DISCONTINUED | OUTPATIENT
Start: 2017-04-30 | End: 2017-05-05 | Stop reason: HOSPADM

## 2017-04-29 RX ORDER — SODIUM CHLORIDE 9 MG/ML
INJECTION, SOLUTION INTRAVENOUS CONTINUOUS
Status: ACTIVE | OUTPATIENT
Start: 2017-04-29 | End: 2017-04-30

## 2017-04-29 RX ORDER — AMOXICILLIN 250 MG
2 CAPSULE ORAL 2 TIMES DAILY
Status: DISCONTINUED | OUTPATIENT
Start: 2017-04-29 | End: 2017-05-05 | Stop reason: HOSPADM

## 2017-04-29 RX ORDER — KETOROLAC TROMETHAMINE 30 MG/ML
30 INJECTION, SOLUTION INTRAMUSCULAR; INTRAVENOUS
COMMUNITY
End: 2017-05-20

## 2017-04-29 RX ORDER — FOLIC ACID 1 MG/1
1 TABLET ORAL DAILY
Status: DISCONTINUED | OUTPATIENT
Start: 2017-04-30 | End: 2017-05-05 | Stop reason: HOSPADM

## 2017-04-29 RX ORDER — ASPIRIN 300 MG/1
300 SUPPOSITORY RECTAL DAILY
Status: DISCONTINUED | OUTPATIENT
Start: 2017-04-29 | End: 2017-05-01

## 2017-04-29 RX ORDER — KETOROLAC TROMETHAMINE 30 MG/ML
30 INJECTION, SOLUTION INTRAMUSCULAR; INTRAVENOUS
Status: DISPENSED | OUTPATIENT
Start: 2017-04-29 | End: 2017-05-04

## 2017-04-29 RX ORDER — OXYCODONE HYDROCHLORIDE 5 MG/1
10 TABLET ORAL EVERY 4 HOURS PRN
Status: DISCONTINUED | OUTPATIENT
Start: 2017-04-29 | End: 2017-04-30

## 2017-04-29 RX ORDER — CALCIUM CARBONATE 500(1250)
500 TABLET ORAL 2 TIMES DAILY WITH MEALS
Status: DISCONTINUED | OUTPATIENT
Start: 2017-04-29 | End: 2017-05-05 | Stop reason: HOSPADM

## 2017-04-29 RX ORDER — PREDNISONE 5 MG/1
15 TABLET ORAL DAILY
Status: DISCONTINUED | OUTPATIENT
Start: 2017-04-30 | End: 2017-05-05 | Stop reason: HOSPADM

## 2017-04-29 RX ORDER — ACETAZOLAMIDE 500 MG/1
500 CAPSULE, EXTENDED RELEASE ORAL
Status: DISCONTINUED | OUTPATIENT
Start: 2017-04-29 | End: 2017-05-05 | Stop reason: HOSPADM

## 2017-04-29 RX ADMIN — DULOXETINE HYDROCHLORIDE 60 MG: 60 CAPSULE, DELAYED RELEASE ORAL at 20:39

## 2017-04-29 RX ADMIN — IOHEXOL 65 ML: 350 INJECTION, SOLUTION INTRAVENOUS at 15:29

## 2017-04-29 RX ADMIN — ONDANSETRON 4 MG: 2 INJECTION INTRAMUSCULAR; INTRAVENOUS at 17:01

## 2017-04-29 RX ADMIN — CALCIUM CARBONATE 500 MG: 1250 TABLET ORAL at 20:40

## 2017-04-29 RX ADMIN — ONDANSETRON 4 MG: 2 INJECTION INTRAMUSCULAR; INTRAVENOUS at 13:22

## 2017-04-29 RX ADMIN — SODIUM CHLORIDE 2000 ML: 9 INJECTION, SOLUTION INTRAVENOUS at 13:22

## 2017-04-29 RX ADMIN — MORPHINE SULFATE 2 MG: 4 INJECTION INTRAVENOUS at 20:45

## 2017-04-29 RX ADMIN — MORPHINE SULFATE 4 MG: 4 INJECTION INTRAVENOUS at 13:22

## 2017-04-29 RX ADMIN — HYDROMORPHONE HYDROCHLORIDE 1 MG: 1 INJECTION, SOLUTION INTRAMUSCULAR; INTRAVENOUS; SUBCUTANEOUS at 17:01

## 2017-04-29 RX ADMIN — SODIUM CHLORIDE: 9 INJECTION, SOLUTION INTRAVENOUS at 17:01

## 2017-04-29 RX ADMIN — SODIUM CHLORIDE: 9 INJECTION, SOLUTION INTRAVENOUS at 20:49

## 2017-04-29 RX ADMIN — GABAPENTIN 600 MG: 300 CAPSULE ORAL at 20:39

## 2017-04-29 RX ADMIN — ACETAZOLAMIDE 500 MG: 500 CAPSULE, EXTENDED RELEASE ORAL at 20:39

## 2017-04-29 RX ADMIN — ASPIRIN 325 MG: 325 TABLET, COATED ORAL at 20:39

## 2017-04-29 RX ADMIN — SULFASALAZINE 1000 MG: 500 TABLET ORAL at 20:40

## 2017-04-29 RX ADMIN — HEPARIN SODIUM 5000 UNITS: 5000 INJECTION, SOLUTION INTRAVENOUS; SUBCUTANEOUS at 22:26

## 2017-04-29 ASSESSMENT — PAIN SCALES - GENERAL
PAINLEVEL_OUTOF10: 2
PAINLEVEL_OUTOF10: 5
PAINLEVEL_OUTOF10: 0
PAINLEVEL_OUTOF10: 0

## 2017-04-29 ASSESSMENT — PAIN SCALES - WONG BAKER
WONGBAKER_NUMERICALRESPONSE: DOESN'T HURT AT ALL
WONGBAKER_NUMERICALRESPONSE: HURTS JUST A LITTLE BIT
WONGBAKER_NUMERICALRESPONSE: DOESN'T HURT AT ALL

## 2017-04-29 ASSESSMENT — LIFESTYLE VARIABLES
ALCOHOL_USE: NO
DO YOU DRINK ALCOHOL: NO
EVER_SMOKED: NEVER

## 2017-04-29 NOTE — ED NOTES
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

## 2017-04-29 NOTE — IP AVS SNAPSHOT
" <p align=\"LEFT\"><IMG SRC=\"//[email or web address]\" alt=\"Image\" WIDTH=\"50%\" HEIGHT=\"200\" BORDER=\"\"></p>                   Name:Enedelia Pang  Medical Record Number:[id number]  CSN: [id number]    YOB: 1970   Age: 46 y.o.  Sex: female  HT:1.6 m (5' 3\") WT: 72.7 kg (160 lb 4.4 oz)          Admit Date: 4/29/2017     Discharge Date:   Today's Date: 5/5/2017  Attending Doctor:  Drew Melo M.D.                  Allergies:  Prochlorperazine; Sumatriptan; and Vancomycin          Your appointments     Martin 15, 2017  2:00 PM   Follow Up Visit with Aimee Andrade M.D.   [facility] (--)    [address], [address]  [facility] [address] [phone]           You will be receiving a confirmation call a few days before your appointment from our automated call confirmation system.            Jul 13, 2017 10:20 AM   New Patient with Selina Reece PA-C   [facility] (--)    [address], [address]  [facility] [address] [phone]           Please bring Photo ID, Insurance Cards, All Medication Bottles and copies of any legal documents (such as Living Will, Power of ) If speaking a language besides English please bring an adult . Please arrive 30 minutes prior for check in and registration. You will be receiving a confirmation call a few days before your appointment from our automated call confirmation system.              Follow-up Information     1. Follow up with Elliott Power M.D.. Go on 5/9/2017.    Specialty:  Family Medicine    Why:  PLEASE ARRIVE AT 8:00AM FOR AN 8:20AM APPOINTMENT. THANK YOU    Contact information    [address]  [facility] [address]  [phone]          2. Schedule an appointment as soon as possible for a visit with Dionisio Soni M.D..    Specialty:  Otolaryngology    Why:  OFFICE IS CLOSED TODAY. PLEASE CONTACT THEM DIRECTLY TO SCHEDULE AN APPOINTMENT. THANK YOU    Contact information    [address]  [place] [address]  [phone]           Medication List      Take these Medications        Instructions    acetaZOLAMIDE  MG Cp12   Commonly known as:  DIAMOX    Take 500 mg by mouth 2 times a day.   Dose:  500 mg       calcium carbonate 500 MG Tabs   Commonly known as:  OS-ZAIN 500    Take 1 Tab by mouth 2 times a day, with meals.   Dose:  500 mg       Cholecalciferol 1000 UNIT Tabs   Commonly known as:  VITAMIND D3    Take 1 Tab by mouth every day.   Dose:  1000 Units       diphenhydrAMINE 50 MG/ML Soln   Commonly known as:  BENADRYL    INJECT IM EVERY 6 HOURS AS NEEDED FOR ACUTE, SEVERE MIGRAINE HEADACHE AS DIRECTED       duloxetine 60 MG Cpep delayed-release capsule   Commonly known as:  CYMBALTA    Take 60 mg by mouth 2 times a day.   Dose:  60 mg       ferrous gluconate 324 (38 FE) MG Tabs   Commonly known as:  FERGON    Take 1 Tab by mouth 2 times a day, with meals.   Dose:  324 mg       folic acid 1 MG Tabs   Commonly known as:  FOLVITE    Take 1 Tab by mouth every day.   Dose:  1 mg       gabapentin 600 MG tablet   Commonly known as:  NEURONTIN    Take 600 mg by mouth 2 times a day.   Dose:  600 mg       ketorolac 60 MG/2ML Soln   Commonly known as:  TORADOL    30 mg by Intramuscular route Once PRN (migraines).   Dose:  30 mg       MAGNESIUM OXIDE PO    Take 1 Tab by mouth every day.   Dose:  1 Tab       methotrexate 2.5 MG Tabs    Take 7 Tabs by mouth every thursday.   Dose:  17.5 mg       MULTIVITAMIN ADULT PO    Take 1 Tab by mouth every day.   Dose:  1 Tab       omeprazole 20 MG delayed-release capsule   Commonly known as:  PRILOSEC    Take 1 Cap by mouth every day.   Dose:  20 mg       oxycodone immediate release 10 MG immediate release tablet   Commonly known as:  ROXICODONE    Take 1 Tab by mouth every four hours as needed for Moderate Pain.   Dose:  10 mg       predniSONE 5 MG Tabs   Commonly known as:  DELTASONE    Take 15 mg by mouth every day.   Dose:  15 mg       risperidone 0.5 MG Tabs   Commonly  known as:  RISPERDAL    Take 1 Tab by mouth 2 Times a Day.   Dose:  0.5 mg       sulfaSALAzine 500 MG Tabs   Commonly known as:  AZULFIDINE    Take 2 Tabs by mouth 2 times a day.   Dose:  1000 mg

## 2017-04-29 NOTE — ED NOTES
Pt presents with multiple complaints sob and cp since 2 am while she was sleeping. Pt c/o bilateral flank pain, nasal pain, and headache. EKG done. Pt has a perforated septum. Pt on methotrexate. Pt in NAD. VSS.

## 2017-04-29 NOTE — IP AVS SNAPSHOT
[employer] Access Code: Activation code not generated  Current [employer] Status: Active    [employer]  A secure, online tool to manage your health information     "[employer]’s [employer]® is a secure, online tool that connects you to your personalized health information from the privacy of your home -- day or night - making it very easy for you to manage your healthcare. Once the activation process is completed, you can even access your medical information using the [employer] brice, which is available for free in the Apple Brice store or Google Play store.     [employer] provides the following levels of access (as shown below):   My Chart Features   [facility] Primary Care Doctor [facility]  Specialists [facility]  Urgent  Care Non-[facility]  Primary Care  Doctor   Email your healthcare team securely and privately 24/7 X X X X   Manage appointments: schedule your next appointment; view details of past/upcoming appointments X      Request prescription refills. X      View recent personal medical records, including lab and immunizations X X X X   View health record, including health history, allergies, medications X X X X   Read reports about your outpatient visits, procedures, consult and ER notes X X X X   See your discharge summary, which is a recap of your hospital and/or ER visit that includes your diagnosis, lab results, and care plan. X X       How to register for [employer]:  1. Go to  [email or web address].  2. Click on the Sign Up Now box, which takes you to the New Member Sign Up page. You will need to provide the following information:  a. Enter your [employer] Access Code exactly as it appears at the top of this page. (You will not need to use this code after you’ve completed the sign-up process. If you do not sign up before the expiration date, you must request a new code.)   b. Enter your date of birth.   c. Enter your home email address.   d. Click Submit, and follow the next screen’s instructions.  3. Create a [employer] ID. This will  be your [employer] login ID and cannot be changed, so think of one that is secure and easy to remember.  4. Create a [employer] password. You can change your password at any time.  5. Enter your Password Reset Question and Answer. This can be used at a later time if you forget your password.   6. Enter your e-mail address. This allows you to receive e-mail notifications when new information is available in [employer] links[employer].  7. Click Sign Up. You can now view your health information.    For assistance activating your [employer] account, call [phone]

## 2017-04-29 NOTE — IP AVS SNAPSHOT
" Home Care Instructions                                                                                                                  Name:Enedelia Pang  Medical Record Number:[id number]  CSN: [id number]    YOB: 1970   Age: 46 y.o.  Sex: female  HT:1.6 m (5' 3\") WT: 72.7 kg (160 lb 4.4 oz)          Admit Date: 4/29/2017     Discharge Date:   Today's Date: 5/5/2017  Attending Doctor:  Drew Melo M.D.                  Allergies:  Prochlorperazine; Sumatriptan; and Vancomycin            Discharge Instructions       Discharge Instructions    Discharged to home by car with relative. Discharged via wheelchair, hospital escort: Yes.  Special equipment needed: Not Applicable    Be sure to schedule a follow-up appointment with your primary care doctor or any specialists as instructed.     Discharge Plan:   Influenza Vaccine Indication: Not indicated: Previously immunized this influenza season and > 8 years of age    I understand that a diet low in cholesterol, fat, and sodium is recommended for good health. Unless I have been given specific instructions below for another diet, I accept this instruction as my diet prescription.   Other diet: as tolerated     Special Instructions: None    · Is patient discharged on Warfarin / Coumadin?   No     · Is patient Post Blood Transfusion?  No    Depression / Suicide Risk    As you are discharged from this [facility] facility, it is important to learn how to keep safe from harming yourself.    Recognize the warning signs:  · Abrupt changes in personality, positive or negative- including increase in energy   · Giving away possessions  · Change in eating patterns- significant weight changes-  positive or negative  · Change in sleeping patterns- unable to sleep or sleeping all the time   · Unwillingness or inability to communicate  · Depression  · Unusual sadness, discouragement and loneliness  · Talk of wanting to die  · Neglect of personal " appearance   · Rebelliousness- reckless behavior  · Withdrawal from people/activities they love  · Confusion- inability to concentrate     If you or a loved one observes any of these behaviors or has concerns about self-harm, here's what you can do:  · Talk about it- your feelings and reasons for harming yourself  · Remove any means that you might use to hurt yourself (examples: pills, rope, extension cords, firearm)  · Get professional help from the community (Mental Health, Substance Abuse, psychological counseling)  · Do not be alone:Call your Safe Contact- someone whom you trust who will be there for you.  · Call your local CRISIS HOTLINE 502-2016 or 939-110-4309  · Call your local Children's Mobile Crisis Response Team [place] (260) 668-4822 or www.[employer]  · Call the toll free National Suicide Prevention Hotlines   · National Suicide Prevention Lifeline 590-237-HTRR (2491)  · [employer] Line Network 800-SUICIDE (629-1536)        Your appointments     Martin 15, 2017  2:00 PM   Follow Up Visit with Aimee Andrade M.D.   [facility] (--)    [address], [address]  [facility] [address] [phone]           You will be receiving a confirmation call a few days before your appointment from our automated call confirmation system.            Jul 13, 2017 10:20 AM   New Patient with Selina Reece PA-C   [facility] (--)    [address], [address]  [facility] [address] [phone]           Please bring Photo ID, Insurance Cards, All Medication Bottles and copies of any legal documents (such as Living Will, Power of ) If speaking a language besides English please bring an adult . Please arrive 30 minutes prior for check in and registration. You will be receiving a confirmation call a few days before your appointment from our automated call confirmation system.              Follow-up Information     1. Follow up with Elliott Power M.D.. Go  on 5/9/2017.    Specialty:  Family Medicine    Why:  PLEASE ARRIVE AT 8:00AM FOR AN 8:20AM APPOINTMENT. THANK YOU    Contact information    [address] [place] [address]  [phone]          2. Schedule an appointment as soon as possible for a visit with Dionisio Soni M.D..    Specialty:  Otolaryngology    Why:  OFFICE IS CLOSED TODAY. PLEASE CONTACT THEM DIRECTLY TO SCHEDULE AN APPOINTMENT. THANK YOU    Contact information    [address]  [address]  [place] [address]  [phone]           Discharge Medication Instructions:    Below are the medications your physician expects you to take upon discharge:    Review all your home medications and newly ordered medications with your doctor and/or pharmacist. Follow medication instructions as directed by your doctor and/or pharmacist.    Please keep your medication list with you and share with your physician.               Medication List      START taking these medications        Instructions    Morning Afternoon Evening Bedtime    risperidone 0.5 MG Tabs   Last time this was given:  0.5 mg on 5/5/2017  8:46 AM   Commonly known as:  RISPERDAL        Take 1 Tab by mouth 2 Times a Day.   Dose:  0.5 mg                          CONTINUE taking these medications        Instructions    Morning Afternoon Evening Bedtime    acetaZOLAMIDE  MG Cp12   Last time this was given:  500 mg on 5/5/2017  5:47 AM   Commonly known as:  DIAMOX        Take 500 mg by mouth 2 times a day.   Dose:  500 mg                        calcium carbonate 500 MG Tabs   Last time this was given:  500 mg on 5/5/2017  8:47 AM   Commonly known as:  OS-ZAIN 500        Take 1 Tab by mouth 2 times a day, with meals.   Dose:  500 mg                        Cholecalciferol 1000 UNIT Tabs   Last time this was given:  1,000 Units on 5/5/2017  8:46 AM   Commonly known as:  VITAMIND D3        Take 1 Tab by mouth every day.   Dose:  1000 Units                        diphenhydrAMINE 50 MG/ML Soln   Last time  this was given:  50 mg on 5/5/2017 11:46 AM   Commonly known as:  BENADRYL        INJECT IM EVERY 6 HOURS AS NEEDED FOR ACUTE, SEVERE MIGRAINE HEADACHE AS DIRECTED                        duloxetine 60 MG Cpep delayed-release capsule   Last time this was given:  60 mg on 5/5/2017  8:46 AM   Commonly known as:  CYMBALTA        Take 60 mg by mouth 2 times a day.   Dose:  60 mg                        ferrous gluconate 324 (38 FE) MG Tabs   Last time this was given:  324 mg on 5/5/2017  8:47 AM   Commonly known as:  FERGON        Take 1 Tab by mouth 2 times a day, with meals.   Dose:  324 mg                        folic acid 1 MG Tabs   Last time this was given:  1 mg on 5/5/2017  8:47 AM   Commonly known as:  FOLVITE        Take 1 Tab by mouth every day.   Dose:  1 mg                        gabapentin 600 MG tablet   Commonly known as:  NEURONTIN        Take 600 mg by mouth 2 times a day.   Dose:  600 mg                        ketorolac 60 MG/2ML Soln   Commonly known as:  TORADOL        30 mg by Intramuscular route Once PRN (migraines).   Dose:  30 mg                        MAGNESIUM OXIDE PO   Last time this was given:  400 mg on 5/3/2017  9:32 PM        Take 1 Tab by mouth every day.   Dose:  1 Tab                        methotrexate 2.5 MG Tabs   Last time this was given:  17.5 mg on 5/4/2017  7:53 AM        Take 7 Tabs by mouth every thursday.   Dose:  17.5 mg                        MULTIVITAMIN ADULT PO        Take 1 Tab by mouth every day.   Dose:  1 Tab                        omeprazole 20 MG delayed-release capsule   Last time this was given:  20 mg on 5/5/2017  8:45 AM   Commonly known as:  PRILOSEC        Take 1 Cap by mouth every day.   Dose:  20 mg                        oxycodone immediate release 10 MG immediate release tablet   Commonly known as:  ROXICODONE        Take 1 Tab by mouth every four hours as needed for Moderate Pain.   Dose:  10 mg                        predniSONE 5 MG Tabs   Last time this  was given:  15 mg on 5/5/2017  8:45 AM   Commonly known as:  DELTASONE        Take 15 mg by mouth every day.   Dose:  15 mg                        sulfaSALAzine 500 MG Tabs   Last time this was given:  1,000 mg on 5/5/2017  8:46 AM   Commonly known as:  AZULFIDINE        Take 2 Tabs by mouth 2 times a day.   Dose:  1000 mg                             Where to Get Your Medications      Information about where to get these medications is not yet available     ! Ask your nurse or doctor about these medications    - methotrexate 2.5 MG Tabs  - risperidone 0.5 MG Tabs            Instructions           Diet / Nutrition:    Follow any diet instructions given to you by your doctor or the dietician, including how much salt (sodium) you are allowed each day.    If you are overweight, talk to your doctor about a weight reduction plan.    Activity:    Remain physically active following your doctor's instructions about exercise and activity.    Rest often.     Any time you become even a little tired or short of breath, SIT DOWN and rest.    Worsening Symptoms:    Report any of the following signs and symptoms to the doctor's office immediately:    *Pain of jaw, arm, or neck  *Chest pain not relieved by medication                               *Dizziness or loss of consciousness  *Difficulty breathing even when at rest   *More tired than usual                                       *Bleeding drainage or swelling of surgical site  *Swelling of feet, ankles, legs or stomach                 *Fever (>100ºF)  *Pink or blood tinged sputum  *Weight gain (3lbs/day or 5lbs /week)           *Shock from internal defibrillator (if applicable)  *Palpitations or irregular heartbeats                *Cool and/or numb extremities    Stroke Awareness    Common Risk Factors for Stroke include:    Age  Atrial Fibrillation  Carotid Artery Stenosis  Diabetes Mellitus  Excessive alcohol consumption  High blood pressure  Overweight   Physical  inactivity  Smoking    Warning signs and symptoms of a stroke include:    *Sudden numbness or weakness of the face, arm or leg (especially on one side of the body).  *Sudden confusion, trouble speaking or understanding.  *Sudden trouble seeing in one or both eyes.  *Sudden trouble walking, dizziness, loss of balance or coordination.Sudden severe headache with no known cause.    It is very important to get treatment quickly when a stroke occurs. If you experience any of the above warning signs, call 911 immediately.                   Disclaimer         Quit Smoking / Tobacco Use:    I understand the use of any tobacco products increases my chance of suffering from future heart disease or stroke and could cause other illnesses which may shorten my life. Quitting the use of tobacco products is the single most important thing I can do to improve my health. For further information on smoking / tobacco cessation call a Toll Free Quit Line at 1-387.592.1106 (*National [employer] [place]) or 1-159.134.4824 (American Lung Association) or you can access the web based program at www.[employer]org.    [place] Tobacco Users Help Line:  (414) 807-1839       Toll Free: 1-753.274.2872  Quit Tobacco Program [facility] Management [facility] (227)985-5536    Crisis Hotline:    [place] Crisis Hotline:  0-773-LJVSRDI or 1-175.965.5421    [place] Crisis Hotline:    1-905.886.7063 or 217-377-3899    Discharge Survey:   Thank you for choosing [facility]. We hope we did everything we could to make your hospital stay a pleasant one. You may be receiving a phone survey and we would appreciate your time and participation in answering the questions. Your input is very valuable to us in our efforts to improve our service to our patients and their families.        My signature on this form indicates that:    1. I have reviewed and understand the above information.  2. My questions regarding this information have been answered to my  satisfaction.  3. I have formulated a plan with my discharge nurse to obtain my prescribed medications for home.                  Disclaimer         __________________________________                     __________       ________                       Patient Signature                                                 Date                    Time

## 2017-04-29 NOTE — IP AVS SNAPSHOT
5/5/2017    Enedelia Pang  [address] [place] [address]    Dear Enedelia:    [facility] wants to ensure your discharge home is safe and you or your loved ones have had all of your questions answered regarding your care after you leave the hospital.    Below is a list of resources and contact information should you have any questions regarding your hospital stay, follow-up instructions, or active medical symptoms.    Questions or Concerns Regarding… Contact   Medical Questions Related to Your Discharge  (7 days a week, 8am-5pm) Contact a Nurse Care Coordinator   [phone]   Medical Questions Not Related to Your Discharge  (24 hours a day / 7 days a week)  Contact the Nurse Health Line   [phone]    Medications or Discharge Instructions Refer to your discharge packet   or contact your [facility] Primary Care Provider   [phone]   Follow-up Appointment(s) Schedule your appointment via PubNub   or contact Scheduling [phone]   Billing Review your statement via PubNub  or contact Billing [phone]   Medical Records Review your records via [employer]ub   or contact Medical Records [phone]     You may receive a telephone call within two days of discharge. This call is to make certain you understand your discharge instructions and have the opportunity to have any questions answered. You can also easily access your medical information, test results and upcoming appointments via the [employer] free online health management tool. You can learn more and sign up at [employer]Pub[employer]ub. For assistance setting up your PubNub account, please call [phone].    Once again, we want to ensure your discharge home is safe and that you have a clear understanding of any next steps in your care. If you have any questions or concerns, please do not hesitate to contact us, we are here for you. Thank you for choosing [facility] for your healthcare needs.    Sincerely,    Your [facility] Healthcare Team

## 2017-04-30 ENCOUNTER — TELEPHONE (OUTPATIENT)
Dept: RHEUMATOLOGY | Facility: PHYSICIAN GROUP | Age: 47
End: 2017-04-30

## 2017-04-30 ENCOUNTER — APPOINTMENT (OUTPATIENT)
Dept: RADIOLOGY | Facility: MEDICAL CENTER | Age: 47
End: 2017-04-30
Attending: HOSPITALIST

## 2017-04-30 PROBLEM — Z86.73 H/O: CVA (CEREBROVASCULAR ACCIDENT): Chronic | Status: ACTIVE | Noted: 2017-04-30

## 2017-04-30 PROBLEM — R45.851 SUICIDAL IDEATION: Status: ACTIVE | Noted: 2017-04-30

## 2017-04-30 LAB
AMPHET UR QL SCN: NEGATIVE
BARBITURATES UR QL SCN: NEGATIVE
BENZODIAZ UR QL SCN: NEGATIVE
BZE UR QL SCN: NEGATIVE
CANNABINOIDS UR QL SCN: NEGATIVE
CHOLEST SERPL-MCNC: 183 MG/DL (ref 100–199)
EKG IMPRESSION: NORMAL
HDLC SERPL-MCNC: 50 MG/DL
LDLC SERPL CALC-MCNC: 115 MG/DL
MDMA UR QL SCN: NEGATIVE
METHADONE UR QL SCN: NEGATIVE
OPIATES UR QL SCN: POSITIVE
OXYCODONE UR QL SCN: NEGATIVE
PCP UR QL SCN: NEGATIVE
PROPOXYPH UR QL SCN: NEGATIVE
TRIGL SERPL-MCNC: 89 MG/DL (ref 0–149)
TROPONIN I SERPL-MCNC: <0.01 NG/ML (ref 0–0.04)

## 2017-04-30 PROCEDURE — 700101 HCHG RX REV CODE 250: Performed by: NURSE PRACTITIONER

## 2017-04-30 PROCEDURE — 96372 THER/PROPH/DIAG INJ SC/IM: CPT

## 2017-04-30 PROCEDURE — 700111 HCHG RX REV CODE 636 W/ 250 OVERRIDE (IP)

## 2017-04-30 PROCEDURE — 99226 PR SUBSEQUENT OBSERVATION CARE,LEVEL III: CPT | Performed by: INTERNAL MEDICINE

## 2017-04-30 PROCEDURE — A9270 NON-COVERED ITEM OR SERVICE: HCPCS | Performed by: NURSE PRACTITIONER

## 2017-04-30 PROCEDURE — 96375 TX/PRO/DX INJ NEW DRUG ADDON: CPT

## 2017-04-30 PROCEDURE — 700111 HCHG RX REV CODE 636 W/ 250 OVERRIDE (IP): Performed by: NURSE PRACTITIONER

## 2017-04-30 PROCEDURE — 700111 HCHG RX REV CODE 636 W/ 250 OVERRIDE (IP): Performed by: INTERNAL MEDICINE

## 2017-04-30 PROCEDURE — 93005 ELECTROCARDIOGRAM TRACING: CPT | Performed by: HOSPITALIST

## 2017-04-30 PROCEDURE — 80307 DRUG TEST PRSMV CHEM ANLYZR: CPT

## 2017-04-30 PROCEDURE — 700102 HCHG RX REV CODE 250 W/ 637 OVERRIDE(OP): Performed by: HOSPITALIST

## 2017-04-30 PROCEDURE — A9270 NON-COVERED ITEM OR SERVICE: HCPCS | Performed by: HOSPITALIST

## 2017-04-30 PROCEDURE — 700111 HCHG RX REV CODE 636 W/ 250 OVERRIDE (IP): Performed by: HOSPITALIST

## 2017-04-30 PROCEDURE — 84484 ASSAY OF TROPONIN QUANT: CPT

## 2017-04-30 PROCEDURE — 96376 TX/PRO/DX INJ SAME DRUG ADON: CPT

## 2017-04-30 PROCEDURE — A9502 TC99M TETROFOSMIN: HCPCS

## 2017-04-30 PROCEDURE — 93010 ELECTROCARDIOGRAM REPORT: CPT | Performed by: INTERNAL MEDICINE

## 2017-04-30 PROCEDURE — 700102 HCHG RX REV CODE 250 W/ 637 OVERRIDE(OP): Performed by: NURSE PRACTITIONER

## 2017-04-30 PROCEDURE — G0378 HOSPITAL OBSERVATION PER HR: HCPCS

## 2017-04-30 RX ORDER — TRAMADOL HYDROCHLORIDE 50 MG/1
50 TABLET ORAL EVERY 6 HOURS PRN
Status: DISCONTINUED | OUTPATIENT
Start: 2017-04-30 | End: 2017-05-05 | Stop reason: HOSPADM

## 2017-04-30 RX ORDER — KETOROLAC TROMETHAMINE 30 MG/ML
30 INJECTION, SOLUTION INTRAMUSCULAR; INTRAVENOUS EVERY 6 HOURS PRN
Status: DISPENSED | OUTPATIENT
Start: 2017-04-30 | End: 2017-05-05

## 2017-04-30 RX ORDER — REGADENOSON 0.08 MG/ML
INJECTION, SOLUTION INTRAVENOUS
Status: COMPLETED
Start: 2017-04-30 | End: 2017-04-30

## 2017-04-30 RX ORDER — LORAZEPAM 1 MG/1
1 TABLET ORAL
Status: DISCONTINUED | OUTPATIENT
Start: 2017-04-30 | End: 2017-05-02

## 2017-04-30 RX ADMIN — REGADENOSON 0.4 MG: 0.08 INJECTION, SOLUTION INTRAVENOUS at 09:03

## 2017-04-30 RX ADMIN — CALCIUM CARBONATE 500 MG: 1250 TABLET ORAL at 09:46

## 2017-04-30 RX ADMIN — ASPIRIN 325 MG: 325 TABLET, COATED ORAL at 09:46

## 2017-04-30 RX ADMIN — HEPARIN SODIUM 5000 UNITS: 5000 INJECTION, SOLUTION INTRAVENOUS; SUBCUTANEOUS at 15:57

## 2017-04-30 RX ADMIN — VITAMIN D, TAB 1000IU (100/BT) 1000 UNITS: 25 TAB at 09:47

## 2017-04-30 RX ADMIN — SULFASALAZINE 1000 MG: 500 TABLET ORAL at 21:42

## 2017-04-30 RX ADMIN — OMEPRAZOLE 20 MG: 20 CAPSULE, DELAYED RELEASE ORAL at 09:47

## 2017-04-30 RX ADMIN — GABAPENTIN 600 MG: 300 CAPSULE ORAL at 09:46

## 2017-04-30 RX ADMIN — DULOXETINE HYDROCHLORIDE 60 MG: 60 CAPSULE, DELAYED RELEASE ORAL at 21:42

## 2017-04-30 RX ADMIN — LORAZEPAM 1 MG: 1 TABLET ORAL at 15:59

## 2017-04-30 RX ADMIN — TRAMADOL HYDROCHLORIDE 50 MG: 50 TABLET, COATED ORAL at 22:44

## 2017-04-30 RX ADMIN — ONDANSETRON 4 MG: 2 INJECTION INTRAMUSCULAR; INTRAVENOUS at 02:06

## 2017-04-30 RX ADMIN — KETOROLAC TROMETHAMINE 30 MG: 30 INJECTION, SOLUTION INTRAMUSCULAR at 15:59

## 2017-04-30 RX ADMIN — ACETAZOLAMIDE 500 MG: 500 CAPSULE, EXTENDED RELEASE ORAL at 15:57

## 2017-04-30 RX ADMIN — SULFASALAZINE 1000 MG: 500 TABLET ORAL at 09:47

## 2017-04-30 RX ADMIN — MAGNESIUM GLUCONATE 500 MG ORAL TABLET 400 MG: 500 TABLET ORAL at 09:46

## 2017-04-30 RX ADMIN — HEPARIN SODIUM 5000 UNITS: 5000 INJECTION, SOLUTION INTRAVENOUS; SUBCUTANEOUS at 21:42

## 2017-04-30 RX ADMIN — ACETAZOLAMIDE 500 MG: 500 CAPSULE, EXTENDED RELEASE ORAL at 09:46

## 2017-04-30 RX ADMIN — HEPARIN SODIUM 5000 UNITS: 5000 INJECTION, SOLUTION INTRAVENOUS; SUBCUTANEOUS at 05:34

## 2017-04-30 RX ADMIN — MORPHINE SULFATE 2 MG: 4 INJECTION INTRAVENOUS at 02:00

## 2017-04-30 RX ADMIN — KETOROLAC TROMETHAMINE 30 MG: 30 INJECTION, SOLUTION INTRAMUSCULAR at 08:12

## 2017-04-30 RX ADMIN — ZOLPIDEM TARTRATE 5 MG: 5 TABLET, FILM COATED ORAL at 22:44

## 2017-04-30 RX ADMIN — MORPHINE SULFATE 2 MG: 4 INJECTION INTRAVENOUS at 09:49

## 2017-04-30 RX ADMIN — LABETALOL HYDROCHLORIDE 10 MG: 5 INJECTION INTRAVENOUS at 00:07

## 2017-04-30 RX ADMIN — TRAMADOL HYDROCHLORIDE 50 MG: 50 TABLET, COATED ORAL at 17:04

## 2017-04-30 RX ADMIN — DIPHENHYDRAMINE HYDROCHLORIDE 12.5 MG: 50 INJECTION, SOLUTION INTRAMUSCULAR; INTRAVENOUS at 09:48

## 2017-04-30 RX ADMIN — LORAZEPAM 1 MG: 1 TABLET ORAL at 18:06

## 2017-04-30 RX ADMIN — CALCIUM CARBONATE 500 MG: 1250 TABLET ORAL at 17:03

## 2017-04-30 RX ADMIN — LORAZEPAM 1 MG: 1 TABLET ORAL at 12:37

## 2017-04-30 RX ADMIN — FOLIC ACID 1 MG: 1 TABLET ORAL at 09:47

## 2017-04-30 RX ADMIN — ONDANSETRON 4 MG: 2 INJECTION INTRAMUSCULAR; INTRAVENOUS at 12:37

## 2017-04-30 RX ADMIN — DULOXETINE HYDROCHLORIDE 60 MG: 60 CAPSULE, DELAYED RELEASE ORAL at 09:47

## 2017-04-30 RX ADMIN — GABAPENTIN 600 MG: 300 CAPSULE ORAL at 21:42

## 2017-04-30 RX ADMIN — ACETAMINOPHEN 650 MG: 325 TABLET, FILM COATED ORAL at 17:03

## 2017-04-30 RX ADMIN — LORAZEPAM 1 MG: 1 TABLET ORAL at 22:44

## 2017-04-30 RX ADMIN — THERA TABS 1 TABLET: TAB at 09:46

## 2017-04-30 RX ADMIN — FERROUS GLUCONATE 324 MG: 324 TABLET ORAL at 09:46

## 2017-04-30 RX ADMIN — FERROUS GLUCONATE 324 MG: 324 TABLET ORAL at 17:04

## 2017-04-30 RX ADMIN — LORAZEPAM 1 MG: 1 TABLET ORAL at 17:03

## 2017-04-30 RX ADMIN — PREDNISONE 15 MG: 5 TABLET ORAL at 09:47

## 2017-04-30 RX ADMIN — MORPHINE SULFATE 2 MG: 4 INJECTION INTRAVENOUS at 05:35

## 2017-04-30 ASSESSMENT — ENCOUNTER SYMPTOMS
COUGH: 0
MYALGIAS: 1
WEAKNESS: 1
EYES NEGATIVE: 1
DOUBLE VISION: 0
FOCAL WEAKNESS: 0
ABDOMINAL PAIN: 1
NERVOUS/ANXIOUS: 1
FEVER: 0
NECK PAIN: 1
PALPITATIONS: 0
TINGLING: 0
NAUSEA: 1
HEADACHES: 1
BACK PAIN: 1
CONSTIPATION: 0
BLURRED VISION: 0
SORE THROAT: 0
DIARRHEA: 1
CARDIOVASCULAR NEGATIVE: 1
DIZZINESS: 0
DIAPHORESIS: 1
DEPRESSION: 0
VOMITING: 1
SHORTNESS OF BREATH: 1
CHILLS: 0
BRUISES/BLEEDS EASILY: 0

## 2017-04-30 ASSESSMENT — PAIN SCALES - GENERAL
PAINLEVEL_OUTOF10: 7
PAINLEVEL_OUTOF10: 0
PAINLEVEL_OUTOF10: 7
PAINLEVEL_OUTOF10: 8

## 2017-04-30 ASSESSMENT — PAIN SCALES - WONG BAKER: WONGBAKER_NUMERICALRESPONSE: DOESN'T HURT AT ALL

## 2017-04-30 NOTE — H&P
DATE OF ADMISSION:  04/29/2017    CHIEF COMPLAINT:  Chest pain.    PRIMARY CARE PHYSICIAN:  Elliott Power MD    ADMITTED TO:  [facility], Dr. Rodriguez.    DIAGNOSIS:  Chest pain, rule out acute coronary syndrome.    HISTORY OF CURRENT ILLNESS:  Patient is a 46-year-old female who says she   started to have chest pain around 7:00 p.m. on April 28th and the patient says   it was pressure like midsternal, she felt like joint was sitting on her   chest, it was constant, it is accompanied tingling in the fingers, was   accompanied by nausea, vomiting, shortness of breath, some abdominal cramping   and diaphoresis.  She says her hands felt weak as well and she lost her   appetite with it.  Patient thus came into the emergency room to be evaluated.    Initial lab work shows a troponin that is less than 0.01.    IMAGING STUDIES:  Chest x-ray show no acute cardiopulmonary process and her   12-lead EKG at this point was also evaluated and found to have sinus   tachycardia with borderline left axis deviation, borderline T-wave   progression, prolonged QT interval with a QT of 352 and a rate of 115 and MS   132, otherwise no significant abnormalities were found.  The patient at this   point will be admitted to the clinical decision unit for evaluation of her   chest pain.    PAST MEDICAL HISTORY:  Includes migraines with aura, history of C. diff,   history of hydrocephalus, history of MRSA, history of Port-A-Cath, pituitary   insufficiency, anxiety, depression, history of a stroke, history of snoring.    PAST SURGICAL HISTORY:  Includes cholecystectomy, tonsillectomy, appendectomy,   tubal ligation, right knee surgery, occipital nerve stimulator implantation,   lumbar exploration and spinal cord stimulator implantation as well.    ALLERGIES:  SHE IS ALLERGIC TO PROCHLORPERAZINE, SUMATRIPTAN AND VANCOMYCIN.    MEDICATIONS:  At the time of admission include acetazolamide 500 mg every 12   hours, calcium carbonate 500 mg  p.o. b.i.d., Benadryl 50 mg IM every 6 hours   p.r.n. for severe migraines, Cymbalta 60 mg p.o. q. day, iron sulfate 324 mg   p.o. b.i.d., folic acid 1 mg p.o. daily, gabapentin 600 mg t.i.d., Toradol 60   mg IM every 6 hours p.r.n. for migraines, magnesium oxide 440 mg p.o. daily,   methotrexate 17.5 mg every Thursday weekly, multivitamin 1 tablet p.o. daily,   omeprazole 20 mg p.o. daily, oxycodone is 10 mg every 4 hours p.r.n. for pain,   prednisone 5 mg p.o. daily, sulfasalazine 500 mg 2 tablets every 12 hours,   vitamin D 1000 units p.o. daily.    SOCIAL HISTORY:  She has never smoked.  No drugs.  No alcohol.  She has an   advanced directive.  She wishes to be full code status.    FAMILY HISTORY:  Mother has irritable bowel syndrome.  Father has heart   disease and diabetes.    REVIEW OF SYSTEMS:  She complains of chest pain started around 7:00 p.m., 7/10   in intensity, midsternal, radiating to the arms, felt like a joint sitting on   her chest, accompanied by nausea, vomiting, diaphoresis, shortness of breath,   abdominal cramping and diarrhea.  Also, complains of constant migraines,   which are recurrent.  Complains of joint pains in the MP joint on the feet.    Complains of the first and second phalangeal joint pains in the fingers.    Complains of knee pain as well as hip joint pain and stiffness in the neck and   the back.  All other review of systems were reviewed and are negative.    PHYSICAL EXAMINATION:  VITAL SIGNS:  Temperature 35.9 with a pulse of 128, respirations 18, blood   pressure 147/104.  She is 71 kilograms and weight 160 cm tall.  GENERAL:  She is currently alert, awake, oriented x3 with her significant   other at bedside.  HEENT:  Head is atraumatic.  Eyes follow in normal range of gaze.  Pupils are   equal, round, reactive bilaterally.  Nose midline without discharge.  Ears   bilaterally intact without discharge.  Oral cavity, moist mucous membranes.    No apparent focus of infection in  the oral cavity.  NECK:  She has a previous scar on the back of her neck, which at this point   does have some increased scarring and thus gives the sensation to one that a   mass might be there, but there really is no mass, it is just scar tissue.  The   patient has no lymphadenopathy or thyromegaly.  CHEST WALL:  Moves equal with inspiration and expiration.  No paradoxical   motion.  No reproducible chest wall tenderness.  HEART:  S1 and S2.  No murmurs or gallops detected.  LUNGS:  Diminished breath sounds at the bases with atelectasis.  No rales or   rhonchi detected.  ABDOMEN:  Soft.  Bowel sounds present in all 4 quadrants.  No hepatomegaly.    No splenomegaly.  No rebound.  No tenderness elicited.  NEUROLOGIC:  Cranial nerves II-XII grossly within normal limits without any   focal deficits appreciated.  SKIN:  Normal turgor.  No rashes or abrasions identified.    LABORATORY EVALUATION:  WBC count is 9.6 with a hemoglobin 13.9, hematocrit   40.3, platelets are 362.  Sodium 140, potassium 3.4, chloride 115, CO2 16, BUN   13, creatinine 0.65, calcium 8.4 with a glucose of 93.  Liver function tests   are within normal limits.  Troponin x2 is negative.  Magnesium 1.9 with an INR   of 1.  Chest x-ray, which I have reviewed myself shows no acute   cardiopulmonary process and CT of the chest shows no pulmonary embolism.  No   acute cardiopulmonary process seen on the chest x-ray.    IMPRESSION AND PLAN:  At this point, chest pain, rule out acute coronary   syndrome.  Patient will be admitted to the clinical decision unit and have 3   sets of cardiac markers, we will set her up with a stress test in the morning.    Patient will continue to be optimized with medications including   beta-blocker, ACE inhibitor, aspirin, and statin.  For the patient's history   of migraines, continue at this point with Benadryl as well as Toradol.  For   her rheumatoid arthritis and possible other autoimmune disease, continue with    methotrexate.  For GERD, continue with Prilosec.  For depression, continue   with Cymbalta.  For iron deficiency, continue with iron supplementation.  For   peripheral neuropathy, continue with gabapentin.  For irritable bowel   syndrome, continue with sulfasalazine as well as Diamox.  The patient at this   point will be on vitamin D supplementation.  She is at this point observation   admission to the clinical decision unit.       ____________________________________     MD MILLIE BEEBE / EUGENE    DD:  04/29/2017 23:34:01  DT:  04/30/2017 00:01:04    D#:  [id number]  Job#:  [id number]    cc: SHUN MOODY MD

## 2017-04-30 NOTE — PROGRESS NOTES
Late entry:  With complaints of headache 8/10, medicated as scheduled, also requested refill of her ice pack, needs attended.

## 2017-04-30 NOTE — PROGRESS NOTES
RN called to Pt room; Pt's  had called Dr. Jones who reported to RN that Pt stated she is suicidal.  Pt has Sad score of 5, placed on legal hold.  Pt asking about her pain management plan, stating she needs her morphine, toradol and benadryl, which she takes at home and it is the only thing that works for her migraine head aches.

## 2017-04-30 NOTE — PROGRESS NOTES
Pt on Legal 2000 transferred to S603/02 via wc accompanied by Charge RN, (2) Security Guards, ; personal belongs, medications and chart with Pt.

## 2017-04-30 NOTE — TELEPHONE ENCOUNTER
Patient's  called.      The patient reports that she started having nosebleeds THursday.  She did not see ENT because she went down to see her family.  The nosebleed continued until Friday.  She states she is reporting active symptoms of nose bleeds and presented to the hospital. For nosebleeds and chest pain   She is also reporting bruising around the eye and a sensation that her sinuses are inflamed.   I did speak with NP taking care of Ms. Pang.  She denies any report of nosebleeds during her hospital stay.  She reports no appreciation of bruising around eyes.    When I called back to discuss that we would have her ENT physician see her in the outpatient setting.  The patient's  then reported that she has been in pain and frustration and reported being suicidal.      I explained my concerns and that I cannot evaluate this over the phone.  That I needed to discuss with her nurse, Desiree, regarding her complaints.  Spoke with Alina FIGUEROA and noted he has informed her  she was suicidal and needs assessment by the primary team.    As for an outpatient plan we will reach out to ENT tomorrow.

## 2017-04-30 NOTE — ED PROVIDER NOTES
ED Provider Note    CHIEF COMPLAINT  Chief Complaint   Patient presents with   • Chest Pain   • Shortness of Breath   • Flank Pain       HPI  Enedelia Pang is a 46 y.o. female who presents to the emergency department with multiple complaints. Since 2 AM this morning the patient is been having midsternal chest pain. She she has also noted a very rapid heart rate and she has slight watery diarrhea and low back discomfort and sinus pain. The patient has chronic shoulder pain and chronic migraine headaches. She has had episodes like this before but no diagnoses have been made. The patient says that her doctor feels that she may have an autoimmune disorder. She does not recognize any precipitating events or exacerbating or alleviating factors.    REVIEW OF SYSTEMS no fever or chills no hemoptysis no black or bloody stool or emesis. All other systems negative    PAST MEDICAL HISTORY  Past Medical History   Diagnosis Date   • Migraine with aura, with intractable migraine, so stated, without mention of status migrainosus    • H/O Clostridium difficile infection    • Hydrocephalus      with lumbar shunt   • Hx MRSA infection    • Port-a-cath in place    • Pituitary abnormality (CMS-HCC)    • Allergy, unspecified not elsewhere classified    • Anxiety    • Depression    • Stroke (CMS-HCC)      2007   • Cold    • Snoring    • Pain        FAMILY HISTORY  History reviewed. No pertinent family history.    SOCIAL HISTORY  Social History     Social History   • Marital Status:      Spouse Name: N/A   • Number of Children: N/A   • Years of Education: N/A     Social History Main Topics   • Smoking status: Never Smoker    • Smokeless tobacco: Never Used   • Alcohol Use: No   • Drug Use: No   • Sexual Activity: Not Asked     Other Topics Concern   • None     Social History Narrative       SURGICAL HISTORY  Past Surgical History   Procedure Laterality Date   • Cholecystectomy     • Tonsillectomy     • Appendectomy     • Tubal  ligation     • Other       right knee surgery   • Other neurological surg       occipital nerve stimulator   • Irrigation & debridement neuro N/A 6/28/2015     Procedure: IRRIGATION & DEBRIDEMENT NEURO;  Surgeon: Oli Oh III, M.D.;  Location: SURGERY [facility];  Service:    • Lumbar exploration N/A 8/31/2015     Procedure: LUMBAR EXPLORATION- Lumbar shunt removal ;  Surgeon: Oli Oh III, M.D.;  Location: SURGERY [facility];  Service:    • Spinal cord stimulator  12/19/2016     Procedure: SPINAL CORD STIMULATOR FOR: PLACEMENT OF LEFT FLANK OCCIPITAL NERVE STIMULATOR BATTERY PACK;  Surgeon: Oli Oh III, M.D.;  Location: SURGERY [facility];  Service:        CURRENT MEDICATIONS  Home Medications     Reviewed by Phyllis Rodriguez M.D. (Physician) on 04/29/17 at 1805  Med List Status: Complete    Medication Last Dose Status    acetaZOLAMIDE SR (DIAMOX) 500 MG CAPSULE SR 12 HR 4/29/2017 Active    calcium carbonate (CALCIUM CARBONATE) 500 MG Tab 4/29/2017 Active    diphenhydrAMINE (BENADRYL) 50 MG/ML Solution 4/29/2017 Active    duloxetine (CYMBALTA) 60 MG CPEP delayed-release capsule 4/26/2017 Active    ferrous gluconate (FERGON) 324 (38 FE) MG Tab 4/29/2017 Active    folic acid (FOLVITE) 1 MG Tab 4/29/2017 Active    gabapentin (NEURONTIN) 600 MG tablet 4/29/2017 Active    ketorolac (TORADOL) 60 MG/2ML Solution 4/29/2017 Active    MAGNESIUM OXIDE PO 4/29/2017 Active    methotrexate 2.5 MG Tab 4/27/2017 Active    Multiple Vitamins-Minerals (MULTIVITAMIN ADULT PO) 4/29/2017 Active    omeprazole (PRILOSEC) 20 MG delayed-release capsule 4/29/2017 Active    oxycodone immediate release (ROXICODONE) 10 MG immediate release tablet >week Active    predniSONE (DELTASONE) 5 MG Tab 4/29/2017 Active    sulfaSALAzine (AZULFIDINE) 500 MG Tab 4/26/2017 Active    vitamin D (VITAMIND D3) 1000 UNIT Tab 4/29/2017 Active                ALLERGIES  Allergies   Allergen Reactions   • Prochlorperazine Swelling      "Tongue swelling. Reaction as a teen. (compazine)  Tolerated Phenergan on 02/24/15   • Sumatriptan Unspecified     Historical=\"Heart stops.\" Reaction  between 1995 to 1997   • Vancomycin Shortness of Breath and Rash     Reaction in 2005.  D/W patient 8/31/15 - tolerated loading dose of vancomycin administered on 8/30/15 with some itching to chest with decreased infusion rate. Red Man Syndrome       PHYSICAL EXAM  VITAL SIGNS: /84 mmHg  Pulse 97  Temp(Src) 36.2 °C (97.2 °F)  Resp 16  Ht 1.6 m (5' 3\")  Wt 72.7 kg (160 lb 4.4 oz)  BMI 28.40 kg/m2  SpO2 95%  Breastfeeding? No   Oxygen saturation is interpreted as adequate  Constitutional: Awake and nontoxic appearing but she does seem anxious  HENT: Mucous membranes are dry throat clear  Eyes: No erythema or discharge or jaundice  Neck: Trachea midline no JVD no meningeal findings  Cardiovascular: Regular tachycardic  Lungs: Clear and equal bilaterally with no apparent difficulty breathing  Abdomen/Back: Soft nontender nondistended no peritoneal findings  Skin: Warm and dry  Musculoskeletal: No leg edema or calf tenderness  Neurologic: Awake and verbal and moving all extremities    Laboratory  A CBC shows a white blood cell count of 9.6 hemoglobin and hematocrit at 13.9 and basic metabolic panel showed a slightly low bicarb of 16, LFTs are unremarkable lactic acid level was normal. Troponin is normal INR is normal TSH is normal at 1.26, pregnancy test is negative    EKG interpretation  12-lead EKG shows a sinus tachycardia 128 bpm there is a left axis deviation is no pathologic ST elevation    Radiology  CT-CTA CHEST PULMONARY ARTERY W/ RECONS   Final Result      No central or segmental pulmonary embolus is identified.      No acute cardiopulmonary process is seen.      Mild biliary ductal prominence may represent ectasia in the setting of prior cholecystectomy.      Hypodense left renal lesion measuring 2.3 cm is increased in size compared to 2015. " Correlation with renal ultrasound is recommended. This likely represents a cyst.                     DX-CHEST-PORTABLE (1 VIEW)   Final Result      No acute cardiopulmonary process is seen.      NM-CARDIAC STRESS TEST    (Results Pending)     MEDICAL DECISION MAKING and DISPOSITION  In the emergency department an IV was established the patient was given intravenous fluids as well as morphine and Zofran and later was given repeat Zofran for continued nausea and a dose of intravenous Dilaudid. Despite these measures the patient remained tachycardic and I have ordered additional intravenous fluids. I reviewed all the findings that are thus far available with the patient and I have notified the hospitalist and the patient is admitted to the hospitalist service for further evaluation and treatment    IMPRESSION  1. Chest pain  2. Tachycardia         Electronically signed by: Claudio Vang, 4/29/2017 7:30 PM

## 2017-04-30 NOTE — PROGRESS NOTES
Pt tearful with washcloth over eyes; stated she has multiple issues, but none are being addressed.  Pt has seen PCP and gastroenterologist, both told Pt to come to ER for unresolved SX.  Pt wants to see psych RN; Dr. Carrillo informed.

## 2017-04-30 NOTE — ED NOTES
Med rec completed per pt at bedside with list  List copied and returned to pt  Allergies reviewed  No ABX in last 30 days   Pt states she has been out of her sulfasalazine and   Duloxetine since Wednesday

## 2017-04-30 NOTE — PROGRESS NOTES
PATIENT ARRIVED TO ROOM TELE PLACED ORIENTED TO ROOM DISCUSSED PLAN OF CARE CALL LIGHT WITHIN REACH NURSING ADMIT PROFILE DONE LAB CALLED TO DRAW TROP UNABLE TO GET FROM IV LINE

## 2017-04-30 NOTE — PROGRESS NOTES
Hospital Medicine Progress Note, Adult, Complex               Author: Sharyn Connelly Date & Time created: 4/30/2017  12:22 PM     Interval History:  47 y/o F that presented with mid sternal CP that underwent tele monitoring/neg trops/and neg stress. She continued to have multiple complaints and changing chief complaint frequently throughout the day and provider to provider. She c/o migraine to myself and Dr Carrillo this AM and denied ever having CP although she clearly described that to providers at admission. She has been crying the entire day on CDU, and requested to see a psychiatrist. When it came time for discharge she stated to her personal rheumatology MD and to the nurse that she will kill herself, has tried before and has a plan. Dr Cox called myself stating that the  and patient has called her c/o nasal bleeding and that Dr Carrillo saw inflammation in her nares (Dr Carrillo denies ever looking into her nares). She also complains of bilat eye bruising to her Dr, which there is none. Dr Cox advised us of her SI threat and that she may otherwise f/u as a outpt for ENT. A Legal Hold was ordered and the Suicide Order set was initiated, I called [phone] for pysch evaluation and expect that to be done tomorrow.     She has now also expressed concern for her morphine and benadryl combo being discontinued now that being admitted to floor. She now says she needs morphine for shoulder pain. That had already been discontinued once ACS was ruled out, and not appropriate in her condition. She may have tramadol and toradol prn pain.    Review of Systems:  Review of Systems   Constitutional: Positive for malaise/fatigue and diaphoresis. Negative for fever and chills.   HENT: Negative for congestion and sore throat.    Eyes: Negative.  Negative for blurred vision and double vision.   Respiratory: Positive for shortness of breath. Negative for cough.    Cardiovascular: Negative.  Negative for chest pain, palpitations and leg  swelling.   Gastrointestinal: Positive for nausea, vomiting, abdominal pain and diarrhea. Negative for constipation.   Genitourinary: Negative.  Negative for dysuria.   Musculoskeletal: Positive for myalgias, back pain, joint pain and neck pain.   Skin: Negative.  Negative for itching and rash.   Neurological: Positive for weakness and headaches. Negative for dizziness, tingling and focal weakness.   Endo/Heme/Allergies: Negative.  Does not bruise/bleed easily.   Psychiatric/Behavioral: Positive for suicidal ideas (expressed at time for discharge, otherwise had been crying all day). Negative for depression. The patient is nervous/anxious.        Physical Exam:  Physical Exam   Constitutional: She is oriented to person, place, and time. She appears well-developed and well-nourished.   HENT:   Head: Normocephalic and atraumatic.   Mouth/Throat: Oropharynx is clear and moist. No oropharyngeal exudate.   Eyes: Conjunctivae and EOM are normal. Pupils are equal, round, and reactive to light. Right eye exhibits no discharge. Left eye exhibits no discharge. No scleral icterus.   Neck: Normal range of motion. Neck supple. No JVD present. No tracheal deviation present.   Cardiovascular: Regular rhythm, normal heart sounds and intact distal pulses.    Tachy- but very anxious/upset   Pulmonary/Chest: Effort normal and breath sounds normal.   Abdominal: Soft. Bowel sounds are normal. She exhibits no distension. There is no tenderness.   Musculoskeletal: Normal range of motion. She exhibits no edema or tenderness.   Lymphadenopathy:     She has no cervical adenopathy.   Neurological: She is alert and oriented to person, place, and time. No cranial nerve deficit.   Skin: Skin is warm and dry. No rash noted. She is not diaphoretic. No erythema.   Prev surg scar, healed   Psychiatric:   Anxious/upset/crying/expressed SI and plan   Nursing note and vitals reviewed.      Labs:        Invalid input(s): [id number]  Recent Labs       17   1409  17   1857  17   0026   TROPONINI  <0.01  <0.01  <0.01     Recent Labs      17   1409   SODIUM  140   POTASSIUM  3.4*   CHLORIDE  115*   CO2  16*   BUN  13   CREATININE  0.65   MAGNESIUM  1.9   CALCIUM  8.4*     Recent Labs      17   1409   ALTSGPT  37   ASTSGOT  15   ALKPHOSPHAT  77   TBILIRUBIN  0.4   GLUCOSE  93     Recent Labs      17   1409   RBC  4.30   HEMOGLOBIN  13.9   HEMATOCRIT  40.3   PLATELETCT  362   PROTHROMBTM  14.1   APTT  25.2   INR  1.06     Recent Labs      17   1409   WBC  9.6   NEUTSPOLYS  86.20*   LYMPHOCYTES  10.70*   MONOCYTES  2.40   EOSINOPHILS  0.00   BASOPHILS  0.30   ASTSGOT  15   ALTSGPT  37   ALKPHOSPHAT  77   TBILIRUBIN  0.4           Hemodynamics:  Temp (24hrs), Av.4 °C (97.6 °F), Min:35.8 °C (96.4 °F), Max:36.9 °C (98.4 °F)  Temperature: 36.8 °C (98.2 °F)  Pulse  Av.9  Min: 84  Max: 146Heart Rate (Monitored): (!) 104  Blood Pressure: 127/88 mmHg, NIBP: 148/91 mmHg     Respiratory:    Respiration: 16, Pulse Oximetry: 96 %           Fluids:  No intake or output data in the 24 hours ending 17 1222  Weight: 72.7 kg (160 lb 4.4 oz)  GI/Nutrition:  Orders Placed This Encounter   Procedures   • DIET ORDER     Standing Status: Standing      Number of Occurrences: 1      Standing Expiration Date:      Order Specific Question:  Diet:     Answer:  Regular [1]     Medical Decision Making, by Problem:  Active Hospital Problems    Diagnosis   • *Chest pain [R07.9]  Tele monitoring -uneventful other than tachy  trops neg  Neg stress test  - now denies being here for CP   • Suicidal ideation [R45.851]  -Legal Hold placed  -Psych consult requested- likely to be done tomorrow  Sitter to bedside- RN aware  SI order set used   • Anxiety [F41.9]  Ativan per SI order set   • Complicated migraine [G43.109]  Toradol/tramadol only   • H/O: CVA (cerebrovascular accident) [Z86.73]  -per patient  Unclear or true etiology- no deficits   •  Chronic pain disorder [G89.4]  Will do tramadol/toradol for now  Is on an IM benadryl and PO oxy at home- question whether appropriate  Cont methotrexate and prednisone   • S/P  shunt [Z98.2]  For h/o hydrocephalus    • Depression [F32.9]  See above  Cont cymbalta       EKG reviewed, Radiology images reviewed, Labs reviewed and Medications reviewed  Lopez catheter: No Lopez      DVT Prophylaxis: Heparin    Ulcer prophylaxis: Yes    Assessed for rehab: Patient returned to prior level of function, rehabilitation not indicated at this time    Dispo- transfer to Medical Floor, Legal Hold, get psych eval , otherwise medically cleared

## 2017-04-30 NOTE — PROGRESS NOTES
Received in bed, aox4, st hr of 109  on monitor. Assessment as per CDU. Call light within reach. Needs attended. Plan of care discussed and understood.

## 2017-04-30 NOTE — PROGRESS NOTES
Resting in bed, denies pain, sr hr of 93, no ectopy, bp improved to 137/74, to continue to monitor.

## 2017-04-30 NOTE — PROGRESS NOTES
Pt back from nuc med via wc; Pt tearful while in nuc med and continues to cry, stating her pain is not physical and she needs to talk to a psych RN.  Pt given breakfast tray, medicated per MAR - will continue to monitor.

## 2017-04-30 NOTE — PROGRESS NOTES
Assumed care of Pt at 0700 after report from CAROLINA Blackmon, assessment complete.  Pt resting comfortably, A & O X 4, VSS but sinus tachy with HR of 101; Pt denies chest pain, pressure, only discomfort at this time is migraine HA - medicated with Toradol IV per MD orders.  Pt updated on and understands POC - to remain NPO for stress test.  Bed in low position, call light within reach - will continue to monitor.    0815  Pt tearful, wants to have Psych consult due to chronic pain before DC; to nuc med via  for stress test.

## 2017-05-01 PROBLEM — M06.9 RHEUMATOID ARTHRITIS (HCC): Status: ACTIVE | Noted: 2017-05-01

## 2017-05-01 PROBLEM — E78.5 HYPERLIPIDEMIA: Status: ACTIVE | Noted: 2017-05-01

## 2017-05-01 PROBLEM — E87.20 METABOLIC ACIDOSIS: Status: ACTIVE | Noted: 2017-05-01

## 2017-05-01 LAB
APPEARANCE UR: CLEAR
BILIRUB UR QL STRIP.AUTO: NEGATIVE
COLOR UR: NORMAL
GLUCOSE UR STRIP.AUTO-MCNC: NEGATIVE MG/DL
KETONES UR STRIP.AUTO-MCNC: NEGATIVE MG/DL
LEUKOCYTE ESTERASE UR QL STRIP.AUTO: NEGATIVE
MICRO URNS: NORMAL
NITRITE UR QL STRIP.AUTO: NEGATIVE
PH UR STRIP.AUTO: 5.5 [PH]
PROT UR QL STRIP: NEGATIVE MG/DL
RBC UR QL AUTO: NEGATIVE
SP GR UR STRIP.AUTO: 1.02

## 2017-05-01 PROCEDURE — 99226 PR SUBSEQUENT OBSERVATION CARE,LEVEL III: CPT | Performed by: INTERNAL MEDICINE

## 2017-05-01 PROCEDURE — A9270 NON-COVERED ITEM OR SERVICE: HCPCS | Performed by: INTERNAL MEDICINE

## 2017-05-01 PROCEDURE — G0378 HOSPITAL OBSERVATION PER HR: HCPCS

## 2017-05-01 PROCEDURE — 700102 HCHG RX REV CODE 250 W/ 637 OVERRIDE(OP): Performed by: HOSPITALIST

## 2017-05-01 PROCEDURE — 700111 HCHG RX REV CODE 636 W/ 250 OVERRIDE (IP): Performed by: INTERNAL MEDICINE

## 2017-05-01 PROCEDURE — A9270 NON-COVERED ITEM OR SERVICE: HCPCS | Performed by: HOSPITALIST

## 2017-05-01 PROCEDURE — 700102 HCHG RX REV CODE 250 W/ 637 OVERRIDE(OP): Performed by: NURSE PRACTITIONER

## 2017-05-01 PROCEDURE — A9270 NON-COVERED ITEM OR SERVICE: HCPCS | Performed by: NURSE PRACTITIONER

## 2017-05-01 PROCEDURE — 700111 HCHG RX REV CODE 636 W/ 250 OVERRIDE (IP): Performed by: HOSPITALIST

## 2017-05-01 PROCEDURE — 81003 URINALYSIS AUTO W/O SCOPE: CPT

## 2017-05-01 PROCEDURE — 700102 HCHG RX REV CODE 250 W/ 637 OVERRIDE(OP): Performed by: INTERNAL MEDICINE

## 2017-05-01 PROCEDURE — 96376 TX/PRO/DX INJ SAME DRUG ADON: CPT

## 2017-05-01 RX ORDER — SIMVASTATIN 40 MG
40 TABLET ORAL EVERY EVENING
Status: DISCONTINUED | OUTPATIENT
Start: 2017-05-01 | End: 2017-05-05 | Stop reason: HOSPADM

## 2017-05-01 RX ADMIN — KETOROLAC TROMETHAMINE 30 MG: 30 INJECTION, SOLUTION INTRAMUSCULAR at 00:31

## 2017-05-01 RX ADMIN — CALCIUM CARBONATE 500 MG: 1250 TABLET ORAL at 08:32

## 2017-05-01 RX ADMIN — LORAZEPAM 1 MG: 1 TABLET ORAL at 17:35

## 2017-05-01 RX ADMIN — CALCIUM CARBONATE 500 MG: 1250 TABLET ORAL at 17:08

## 2017-05-01 RX ADMIN — FERROUS GLUCONATE 324 MG: 324 TABLET ORAL at 17:09

## 2017-05-01 RX ADMIN — DULOXETINE HYDROCHLORIDE 60 MG: 60 CAPSULE, DELAYED RELEASE ORAL at 08:34

## 2017-05-01 RX ADMIN — ZOLPIDEM TARTRATE 5 MG: 5 TABLET, FILM COATED ORAL at 21:57

## 2017-05-01 RX ADMIN — ONDANSETRON 4 MG: 4 TABLET, ORALLY DISINTEGRATING ORAL at 10:58

## 2017-05-01 RX ADMIN — TRAMADOL HYDROCHLORIDE 50 MG: 50 TABLET, COATED ORAL at 08:41

## 2017-05-01 RX ADMIN — SULFASALAZINE 1000 MG: 500 TABLET ORAL at 21:16

## 2017-05-01 RX ADMIN — OMEPRAZOLE 20 MG: 20 CAPSULE, DELAYED RELEASE ORAL at 08:33

## 2017-05-01 RX ADMIN — ACETAZOLAMIDE 500 MG: 500 CAPSULE, EXTENDED RELEASE ORAL at 15:49

## 2017-05-01 RX ADMIN — TRAMADOL HYDROCHLORIDE 50 MG: 50 TABLET, COATED ORAL at 21:56

## 2017-05-01 RX ADMIN — HEPARIN SODIUM 5000 UNITS: 5000 INJECTION, SOLUTION INTRAVENOUS; SUBCUTANEOUS at 14:06

## 2017-05-01 RX ADMIN — TRAMADOL HYDROCHLORIDE 50 MG: 50 TABLET, COATED ORAL at 15:48

## 2017-05-01 RX ADMIN — DIPHENHYDRAMINE HYDROCHLORIDE 12.5 MG: 50 INJECTION, SOLUTION INTRAMUSCULAR; INTRAVENOUS at 11:03

## 2017-05-01 RX ADMIN — GABAPENTIN 600 MG: 300 CAPSULE ORAL at 08:35

## 2017-05-01 RX ADMIN — HEPARIN SODIUM 5000 UNITS: 5000 INJECTION, SOLUTION INTRAVENOUS; SUBCUTANEOUS at 21:14

## 2017-05-01 RX ADMIN — FERROUS GLUCONATE 324 MG: 324 TABLET ORAL at 08:33

## 2017-05-01 RX ADMIN — MAGNESIUM GLUCONATE 500 MG ORAL TABLET 400 MG: 500 TABLET ORAL at 08:35

## 2017-05-01 RX ADMIN — GABAPENTIN 600 MG: 300 CAPSULE ORAL at 21:13

## 2017-05-01 RX ADMIN — PREDNISONE 15 MG: 5 TABLET ORAL at 08:33

## 2017-05-01 RX ADMIN — SIMVASTATIN 40 MG: 40 TABLET, FILM COATED ORAL at 21:13

## 2017-05-01 RX ADMIN — SULFASALAZINE 1000 MG: 500 TABLET ORAL at 08:41

## 2017-05-01 RX ADMIN — THERA TABS 1 TABLET: TAB at 08:35

## 2017-05-01 RX ADMIN — DULOXETINE HYDROCHLORIDE 60 MG: 60 CAPSULE, DELAYED RELEASE ORAL at 21:14

## 2017-05-01 RX ADMIN — KETOROLAC TROMETHAMINE 30 MG: 30 INJECTION, SOLUTION INTRAMUSCULAR at 11:04

## 2017-05-01 RX ADMIN — VITAMIN D, TAB 1000IU (100/BT) 1000 UNITS: 25 TAB at 08:35

## 2017-05-01 RX ADMIN — FOLIC ACID 1 MG: 1 TABLET ORAL at 08:34

## 2017-05-01 RX ADMIN — KETOROLAC TROMETHAMINE 30 MG: 30 INJECTION, SOLUTION INTRAMUSCULAR at 23:09

## 2017-05-01 RX ADMIN — DIPHENHYDRAMINE HYDROCHLORIDE 12.5 MG: 50 INJECTION, SOLUTION INTRAMUSCULAR; INTRAVENOUS at 17:09

## 2017-05-01 RX ADMIN — DIPHENHYDRAMINE HYDROCHLORIDE 12.5 MG: 50 INJECTION, SOLUTION INTRAMUSCULAR; INTRAVENOUS at 00:28

## 2017-05-01 RX ADMIN — KETOROLAC TROMETHAMINE 30 MG: 30 INJECTION, SOLUTION INTRAMUSCULAR at 17:09

## 2017-05-01 RX ADMIN — ASPIRIN 325 MG: 325 TABLET, COATED ORAL at 08:33

## 2017-05-01 RX ADMIN — LORAZEPAM 1 MG: 1 TABLET ORAL at 00:36

## 2017-05-01 RX ADMIN — DIPHENHYDRAMINE HYDROCHLORIDE 12.5 MG: 50 INJECTION, SOLUTION INTRAMUSCULAR; INTRAVENOUS at 23:08

## 2017-05-01 ASSESSMENT — PAIN SCALES - GENERAL
PAINLEVEL_OUTOF10: 10
PAINLEVEL_OUTOF10: 8
PAINLEVEL_OUTOF10: 10

## 2017-05-01 NOTE — DISCHARGE PLANNING
Legal Hold    Date of Legal Hold: 17  Time of Legal Hold: 1445    Where was patient when legal hold initiated: [facility]    Legal Hold will : 5/3/17 1700    Medical Clearance Complete: yes    Psychiatric Certification Complete: no    Paperwork sent to  for extension: yes    What doctor will be signing the extension: Unknown at this time-pending psych eval.    Extension paperwork attained: In process    Will patient present to [facility] mental health on Wednesday morning?: Yes if still on legal hold    Referral placed to Psychiatric Facility: Will send referrals    Ongoing Plan: SW to send referrals to In Patient Psych facilities while pending psych consult.

## 2017-05-01 NOTE — PROGRESS NOTES
Pt is AOx4, complains of pain, medicated with PRN pain medications, tolerated all oral medications, skin and sacral assessment done, RN palpated something firm on pt's left lower back, per pt it's her TENS, on RA, call light in use, no bed alarm on, charge RN aware, 1:1 sitter at bedside for legal hold, pending psych consult, educated to wear treaded socks on prior to getting OOB, bed locked in low position, plan of care discussed, all needs met at this time.

## 2017-05-01 NOTE — PROGRESS NOTES
"Pt keeps insisting on increasing her pain medications, RN already educated pt several times today that MD is awaiting her records from Dr. Power for her medications. Pt upset that no one is treating her and that she wouldn't even get any phone or her remote for her TENS, RN explained to pt about the legal hold protocol, pt does verbalize understanding but is still upset that \"nobody is giving her treatments\", RN continued to educate and explain to pt her plan of care and the treatments she has currently. RN will continue to reinforce education.    This AM, pt's  also brought pt's medication list. Copy in the chart, Dr. William notified of this and still awaiting for further medical records from Dr. Power.   "

## 2017-05-01 NOTE — DISCHARGE PLANNING
"Care Transition Team Assessment    SW met with pt at bedside to complete assessment. Current dc plan is to an in patient facility for further psychiatric treatment unless pt's legal hold is dc'd before that time. Pt currently pending a psych evaluation. Pt advised she came to the ER with a list of symptoms and was only tested/treated for the chest pain. She states she they were going to discharge her without treating any of her other symptoms and she \"went a little wackadoodle\" out of desperation.     SW addressed the order placed to  regarding concern of pt giving her children injections of bendaryl for their migraines. Pt states she has 4 children at home, 3 are age 16 and one is 18. Pt advised that she does give 2 of her children benadryl for migraines, Trell age 18 and Xerxes age 16, but she just gives them regular Benadryl and not the shots. She advised that originally their neurologist prescribed them shots of benadryl but they no longer use shots and only regular benadryl. Pt states they see Dr. Velasquez and have an appointment with him in about 3 weeks. No concerns from SW standpoint at this time regarding above order.     Information Source  Orientation : Oriented x 4  Information Given By: Patient  Informant's Name: Enedelia Pang  Who is responsible for making decisions for patient? : Patient    Elopement Risk  Legal Hold: Elopement Risk  Ambulatory or Self Mobile in Wheelchair: Yes  Disoriented: No  Psychiatric Symptoms: None  History of Wandering: No  Elopement this Admit: No  Vocalizing Wanting to Leave: No  Displays Behaviors, Body Language Wanting to Leave: No-Not at Risk for Elopement  Time of Legal Hold: 1445  Date of Legal Hold: 04/30/17  Elopement Risk: At Risk for Elopement  Wanderguard On:  (1:1 sitter)    Interdisciplinary Discharge Planning  Does Admitting Nurse Feel This Could be a Complex Discharge?: No  Primary Care Physician: dr su paez  Lives with - Patient's Self " "Care Capacity: Spouse, Child Less than 18 Years of Age, Adult Children  Patient or legal guardian wants to designate a caregiver (see row info): No  Support Systems: Spouse / Significant Other  Housing / Facility: 1 Story House  Do You Take your Prescribed Medications Regularly: Yes  Able to Return to Previous ADL's: Yes  Mobility Issues: No  Prior Services: None  Patient Expects to be Discharged to:: home  Assistance Needed: No  Durable Medical Equipment: Not Applicable    Discharge Preparedness  What is your plan after discharge?: Other (comment) (In Patient Pyschiatric facility)  What are your discharge supports?: Parent, Sibling, Spouse  Prior Functional Level: Needs Assist with Activities of Daily Living  Difficulity with ADLs: Bathing, Dressing, Walking  Difficulty with ADLs Comment: Spouse supports pt with above    Functional Assesment  Prior Functional Level: Needs Assist with Activities of Daily Living    Finances  Financial Barriers to Discharge: Yes  Average Monthly Income:  (1,000 from spouse 1500 from son, pt is trying to set up social security disability for herself)  Source of Income: Social Security Disability  Prescription Coverage: Yes    Vision / Hearing Impairment  Vision Impairment : Yes  Right Eye Vision: Wears Glasses  Left Eye Vision: Wears Glasses  Hearing Impairment : No    Values / Beliefs / Concerns  Values / Beliefs Concerns : No    Domestic Abuse  Have you ever been the victim of abuse or violence?: No  Possible Abuse Reported to:: Not Applicable    Psychological Assessment  History of Substance Abuse: None (per pt)  History of Psychiatric Problems: Yes (Possible depression and these \"moments of desperation\"). Pt advised she was on Depakote from the time she was 14 until 2013, so she may have had depression during that time and been unaware of it due to the depakote she was taking.     Discharge Risks or Barriers  Discharge risks or barriers?: Mental health  Patient risk factors: Mental " health    Anticipated Discharge Information  Anticipated discharge disposition: Psychiatric admission - involuntary

## 2017-05-01 NOTE — TELEPHONE ENCOUNTER
Called today to speak with Dr. Soni. He is in surgery today.  Left message with his staff for a requested call back.

## 2017-05-01 NOTE — PROGRESS NOTES
When RN went in to give pt migraine medication pt complained that it does not work that she takes 100mg of Toradol @ home and 100mg of Benadryl. When asked who prescribed these medications to her pt stated a Dr. Mart from [facility] in the neurology department. When asked for his full name so we can clarify pt medication doses pt changed her story, she said a doctor Kiki prescribes the medication for her but she did not know the last name. Pt stated that RN's question is confusing her and finally said that a Dr. Elliott [facility] has been prescribing her 60mg Toradol 3x a week. Pt appears very confused at this time. Administered Ativan to help pt relax. Advised pt to talk to MD in the morning about all her concerns.

## 2017-05-01 NOTE — PROGRESS NOTES
"Pt very agitated. She stated \"I am being treated subhuman. I am being treated like a rat. Nobody is listening to me. Nobody cares about my side of my story. I am being treated like a subhuman. No one wants to let me take my medication. At home I give myself a 60 of toradol and I give myself a 100mg of Benadryl. People can't just keep telling me to shut up. I am to the point where I don't even care anymore.\"   "

## 2017-05-01 NOTE — PROGRESS NOTES
"Pt very upset when RN went in to give meds. Stated \"I would like to talk to doctor.\" Explained to pt her POC, that she is awaiting a psych consult in the AM and that she is on a legal hold. Pt stated \"You have no idea what kind of pain I am in. To have a body part missing.\" Explained to pt her pain medications and her schedule for taking them. Pt started getting teary eyed and verbalized \"If anyone would just take a second to listen to me instead of just making me wait.\" Charge RN aware of pt request. Will continue to monitor.   "

## 2017-05-01 NOTE — PROGRESS NOTES
Hospital Medicine Interval Note  Date of Service:  5/1/2017    Chief Complaint  46 y.o.-year-old female admitted 4/29/2017 with Chest Pain; Shortness of Breath; and Flank Pain    Interval Problem Update  Chart reviewed  Transferred for SI and HI after being ruled out for chest pain. Stating no one is addressing all of the problems that are going on with me,   Stating has hole in nose that is getting larger and that it was bleeding a lot before admit, no reports of bloody urine, reporting uncontrollable diarrhea, reporting Dr. Elliott [facility] is prescribing IV benadryl and IV toradol for home use and pt reporting giving to self IM. Also sees a Dr. Mart from [facility] in the Neurology department for chronic migrains and a rheumatologist. She however is very uncooperative, tangential and after asking who her doctors where proceeded to ask mor epain meds, accusing that even these docotrs do not know what was going on with her pain and demanding escalation og these medications.    Consultants/Specialty  Psychiatry to see.    Disposition  Legal hold.     ROSUnable to obtain, not reliable because of her psychiatric issues. No insight to her disease.   Physical Exam Laboratory/Imaging   Filed Vitals:    04/30/17 1733 04/30/17 2056 04/30/17 2215 05/01/17 0830   BP: 154/97 113/70  117/69   Pulse: 136 107 93 108   Temp: 37 °C (98.6 °F) 36.7 °C (98 °F)  36.1 °C (97 °F)   Resp: 20 17  16   Height:       Weight:       SpO2: 92% 97% 98% 100%       Intake/Output Summary (Last 24 hours) at 05/01/17 1554  Last data filed at 04/30/17 2136   Gross per 24 hour   Intake    200 ml   Output      0 ml   Net    200 ml     Physical Exam   Vitals Reviewed  General/Constitutional: No acute distress.   Head: Normocephalic, atraumatic  ENT: Oral mucosa is moist. No obvious pharyngeal exudates  Eyes: Pink conjunctiva, no scleral icterus  Neck: Supple, no lymphadenopathy  Cardiovascular: Normal rate and regular  rhythm. S1,2 noted. No murmurs, gallops or rubs.  Pulmonary: Diminished at bases but moving air well.  Abdominal: Soft, nontender, not distended, bowel sounds normoactive.  Musculoskeletal: Pain out of proportion to exam  Arthritic changes, digits  Neurologic: Grossly nonfocal, moving all extremities.  Genitourinary: No gross hematuria  Skin: No obvious rash.  Psychiatric: Labile mood. Uncooperative.  Labs Reviewed  Imaging reviewed     Lab Results   Component Value Date/Time    WBC 9.6 04/29/2017 02:09 PM    HEMOGLOBIN 13.9 04/29/2017 02:09 PM    HEMATOCRIT 40.3 04/29/2017 02:09 PM    PLATELET COUNT 362 04/29/2017 02:09 PM     Lab Results   Component Value Date/Time    SODIUM 140 04/29/2017 02:09 PM    POTASSIUM 3.4* 04/29/2017 02:09 PM    CHLORIDE 115* 04/29/2017 02:09 PM    CO2 16* 04/29/2017 02:09 PM    GLUCOSE 93 04/29/2017 02:09 PM    BUN 13 04/29/2017 02:09 PM    CREATININE 0.65 04/29/2017 02:09 PM      Assessment/Plan    * Chest pain  Assessment & Plan  Stress test negative. Has RA and is on narcotics. Drug seeking behavior suspected. Was to be discharged but she developed suicidal ideation and is on a legal hold.    Anxiety  Assessment & Plan  Legal hold, Psychiatry likely consulted.    Suicidal ideation  Assessment & Plan  On legal hold. Psychiatry to see.    Complicated migraine  Assessment & Plan  On several narcotics and NSAIDs.    S/P  shunt  Assessment & Plan  Stable.     Depression  Assessment & Plan  Psychiatry consulted. On Cymbalta.    Chronic pain disorder  Assessment & Plan  On narcotics and NSAIDs.    H/O: CVA (cerebrovascular accident)  Assessment & Plan  Was not on aspirin. Started on full dose aspirin for chest pain. Will reduce to low dose aspirin. Lipid panel checked and . If not contraindicated start statin.    Hyperlipidemia  Assessment & Plan  . Started statin bec of reported history of CVA    Metabolic acidosis  Assessment & Plan  Present on admission. Normal  creatinine. Lactic acid normal. Probably starvation ketosis. Urinalysis ordered and obtain another BMP.    Rheumatoid arthritis  Follows a Dr. Aimee Cox, Rheumatology. SHe claims diagnosis unclear. Family requesting consultation per nursing staff. I attempted to look for her on the provider finder but I could not find any. Obtain clinic records and will have attending who will inherit her care to consider speaking with her per family request; doubt if anything would be done.     I spent 40 minutes, reviewing the chart, notes, vitals, labs, imaging, ordering labs, evaluating Enedelia Pang for assessment, enacting the plan above.          DVT Prophylaxis: Heparin

## 2017-05-01 NOTE — ASSESSMENT & PLAN NOTE
Stress test negative. Has RA and is on narcotics. Drug seeking behavior suspected. Was to be discharged but she developed suicidal ideation and is on a legal hold.

## 2017-05-01 NOTE — PROGRESS NOTES
"Pt stated while this RN and RN Maribel was in the room that she gives her children injections of Benadryl. Verbalized \"12.5 mg of Benadryl is nothing, I give my children injections of that.\" When asked why she gives her children injections of Benadryl she said \"Because they have migraines!\" Informed charge RN of what pt said. Will put a Social Work consult.   "

## 2017-05-01 NOTE — PROGRESS NOTES
"At 2215 the PSA sitting for the pt called this CNA and said that the pt was requesting O2. Pt saturation was 98% and HR was 93. This CNA told the pt that I would check with CAROLINA Mayen to see if she could have the O2. CAROLINA Mayen stated that since pt saturation was at optimal level and the status of pt legal hold, pt is not to have O2. When this CNA went back to give pt heat packs and tell her that she could not have the O2 the pt stated \"no one is helping me and no one cares about my pain. My headache is a 12/10 and I'm gonna stroke out so hopefully I won't remember the incident. I was promised an IV for my medications because if you throw them down my throat or put them in my hip they don't work. I was promised to be able to talk to the Dr when I got to the unit. Lies lies lies no one is doing anything and no one is helping with my pain.\" After trying to deescalate the pt by telling her that everyone is doing what we can but it is up to her Dr's to decide what medications she gets the pt went on to say \"everyone always passes me along because they always say they can't do anything or it's not up to them because I'm the crazy lady.\" She then went on to say, \" you guys need to call the Dr and get me Dillaudid and morphine or oxi and if they say no then they need to come in and tell me themselves!\" When this CNA told her that we would to everything that we can do tonight to help with her pain and have the charge nurse come and address her other needs she said \" that's what everyone says I've been told that for the past 5hrs and no one has come to talk to me. No one sits here and listens to what I need for more that 3sec\" I then told pt that I had spent a half hour in her room trying to address her needs. She then went on to say \" I at least need that oxygen because my heart rate is140 and O2 or pain meds would be the only thing that helps. My HR was 140 when I got here and it still is.\" This CNA then clarified that her " HR was 93 when checked a few minutes previous. I then stated that the charge RN would come and speak to her to further address her needs as soon as she has a chance.

## 2017-05-01 NOTE — ASSESSMENT & PLAN NOTE
Present on admission. Normal creatinine. Lactic acid normal. Probably starvation ketosis. Urinalysis ordered and obtain another BMP.

## 2017-05-01 NOTE — PROGRESS NOTES
Called [facility] to follow up on pt's medical records from [facility]. Awaiting to get pt's records via fax, health info release form faxed over to the clinic this AM.

## 2017-05-01 NOTE — PROGRESS NOTES
Assumed care at 1650. Patient very anxious and has several complaints. Given medications for pain and anxiety see MAR. 1:1 sitter in place as patient is a risk for suicide. All dangerous objects removed from room. Patient's IV was removed because it become dislodged. Hourly rounding in place.

## 2017-05-01 NOTE — DISCHARGE PLANNING
Pulled patient's Advance Directive off of scanning. Unfortunately document is not signed or notarized making it invalid. Spoke with Deb, , to obtain Certificate of Competency. Patient is on a Legal Hold at this time. If she is taken off we can do a Certificate.

## 2017-05-01 NOTE — CARE PLAN
Problem: Discharge Barriers/Planning  Goal: Patient’s continuum of care needs will be met  Outcome: PROGRESSING AS EXPECTED  On legal hold, 1:1 sitter at bedside, psych consult pending    Problem: Pain Management  Goal: Pain level will decrease to patient’s comfort goal  Outcome: PROGRESSING AS EXPECTED  Pt complained of pain, medicated with PRN pain medications see MAR, will continue to monitor for pain

## 2017-05-01 NOTE — PROGRESS NOTES
Received report from day shift RN. Discussed POC with pt., verbalized understanding, assumed care @1915. A&Ox4. On legal hold. 1:1 sitter @ bedside. No personal belongings at bedside. Pt very anxious, complaining of pain and how nobody listens to her and how she needs pain medications and how she has multiple issues that need to be addressed including her sinus, her migraine. On room air. Complaining of pain in back, neck, migraine and sinus. Medicated appropriately per MAR. Safety precautions in place; treaded socks on, call light within reach, personal belongings within reach, upper bed rails up.

## 2017-05-01 NOTE — ASSESSMENT & PLAN NOTE
Was not on aspirin. Started on full dose aspirin for chest pain. Will reduce to low dose aspirin. Lipid panel checked and . If not contraindicated start statin.

## 2017-05-02 ENCOUNTER — TELEPHONE (OUTPATIENT)
Dept: RHEUMATOLOGY | Facility: PHYSICIAN GROUP | Age: 47
End: 2017-05-02

## 2017-05-02 DIAGNOSIS — J34.89 NASAL SEPTAL PERFORATION: ICD-10-CM

## 2017-05-02 LAB
ANION GAP SERPL CALC-SCNC: 11 MMOL/L (ref 0–11.9)
BUN SERPL-MCNC: 14 MG/DL (ref 8–22)
CALCIUM SERPL-MCNC: 9.5 MG/DL (ref 8.5–10.5)
CHLORIDE SERPL-SCNC: 107 MMOL/L (ref 96–112)
CO2 SERPL-SCNC: 21 MMOL/L (ref 20–33)
CREAT SERPL-MCNC: 0.65 MG/DL (ref 0.5–1.4)
GFR SERPL CREATININE-BSD FRML MDRD: >60 ML/MIN/1.73 M 2
GLUCOSE SERPL-MCNC: 85 MG/DL (ref 65–99)
POTASSIUM SERPL-SCNC: 3 MMOL/L (ref 3.6–5.5)
SODIUM SERPL-SCNC: 139 MMOL/L (ref 135–145)

## 2017-05-02 PROCEDURE — 700105 HCHG RX REV CODE 258: Performed by: INTERNAL MEDICINE

## 2017-05-02 PROCEDURE — 700102 HCHG RX REV CODE 250 W/ 637 OVERRIDE(OP): Performed by: PSYCHIATRY & NEUROLOGY

## 2017-05-02 PROCEDURE — 700111 HCHG RX REV CODE 636 W/ 250 OVERRIDE (IP): Performed by: INTERNAL MEDICINE

## 2017-05-02 PROCEDURE — 80048 BASIC METABOLIC PNL TOTAL CA: CPT

## 2017-05-02 PROCEDURE — 700111 HCHG RX REV CODE 636 W/ 250 OVERRIDE (IP)

## 2017-05-02 PROCEDURE — 700102 HCHG RX REV CODE 250 W/ 637 OVERRIDE(OP): Performed by: INTERNAL MEDICINE

## 2017-05-02 PROCEDURE — 700102 HCHG RX REV CODE 250 W/ 637 OVERRIDE(OP): Performed by: HOSPITALIST

## 2017-05-02 PROCEDURE — 36415 COLL VENOUS BLD VENIPUNCTURE: CPT

## 2017-05-02 PROCEDURE — A9270 NON-COVERED ITEM OR SERVICE: HCPCS | Performed by: HOSPITALIST

## 2017-05-02 PROCEDURE — G0378 HOSPITAL OBSERVATION PER HR: HCPCS

## 2017-05-02 PROCEDURE — 99225 PR SUBSEQUENT OBSERVATION CARE,LEVEL II: CPT | Performed by: INTERNAL MEDICINE

## 2017-05-02 PROCEDURE — 96367 TX/PROPH/DG ADDL SEQ IV INF: CPT

## 2017-05-02 PROCEDURE — A9270 NON-COVERED ITEM OR SERVICE: HCPCS | Performed by: INTERNAL MEDICINE

## 2017-05-02 PROCEDURE — 96376 TX/PRO/DX INJ SAME DRUG ADON: CPT

## 2017-05-02 PROCEDURE — A9270 NON-COVERED ITEM OR SERVICE: HCPCS | Performed by: NURSE PRACTITIONER

## 2017-05-02 PROCEDURE — 700111 HCHG RX REV CODE 636 W/ 250 OVERRIDE (IP): Performed by: HOSPITALIST

## 2017-05-02 PROCEDURE — A9270 NON-COVERED ITEM OR SERVICE: HCPCS | Performed by: PSYCHIATRY & NEUROLOGY

## 2017-05-02 PROCEDURE — 700102 HCHG RX REV CODE 250 W/ 637 OVERRIDE(OP): Performed by: NURSE PRACTITIONER

## 2017-05-02 RX ORDER — LORAZEPAM 1 MG/1
1 TABLET ORAL EVERY 4 HOURS PRN
Status: DISCONTINUED | OUTPATIENT
Start: 2017-05-02 | End: 2017-05-03

## 2017-05-02 RX ORDER — DIPHENHYDRAMINE HYDROCHLORIDE 50 MG/ML
50 INJECTION INTRAMUSCULAR; INTRAVENOUS EVERY 6 HOURS PRN
Status: DISCONTINUED | OUTPATIENT
Start: 2017-05-02 | End: 2017-05-05 | Stop reason: HOSPADM

## 2017-05-02 RX ORDER — DIPHENHYDRAMINE HYDROCHLORIDE 50 MG/ML
25 INJECTION INTRAMUSCULAR; INTRAVENOUS EVERY 6 HOURS PRN
Status: DISCONTINUED | OUTPATIENT
Start: 2017-05-02 | End: 2017-05-02

## 2017-05-02 RX ORDER — POTASSIUM CHLORIDE 20 MEQ/1
40 TABLET, EXTENDED RELEASE ORAL 2 TIMES DAILY
Status: COMPLETED | OUTPATIENT
Start: 2017-05-02 | End: 2017-05-03

## 2017-05-02 RX ORDER — RISPERIDONE 0.5 MG/1
0.5 TABLET ORAL 2 TIMES DAILY
Status: DISCONTINUED | OUTPATIENT
Start: 2017-05-02 | End: 2017-05-05 | Stop reason: HOSPADM

## 2017-05-02 RX ADMIN — FERROUS GLUCONATE 324 MG: 324 TABLET ORAL at 16:47

## 2017-05-02 RX ADMIN — SIMVASTATIN 40 MG: 40 TABLET, FILM COATED ORAL at 20:32

## 2017-05-02 RX ADMIN — OMEPRAZOLE 20 MG: 20 CAPSULE, DELAYED RELEASE ORAL at 08:08

## 2017-05-02 RX ADMIN — FOLIC ACID 1 MG: 1 TABLET ORAL at 08:08

## 2017-05-02 RX ADMIN — SULFASALAZINE 1000 MG: 500 TABLET ORAL at 08:08

## 2017-05-02 RX ADMIN — ACETAZOLAMIDE 500 MG: 500 CAPSULE, EXTENDED RELEASE ORAL at 16:41

## 2017-05-02 RX ADMIN — FERROUS GLUCONATE 324 MG: 324 TABLET ORAL at 08:09

## 2017-05-02 RX ADMIN — ASPIRIN 81 MG: 81 TABLET ORAL at 08:09

## 2017-05-02 RX ADMIN — DIPHENHYDRAMINE HYDROCHLORIDE 25 MG: 50 INJECTION, SOLUTION INTRAMUSCULAR; INTRAVENOUS at 18:24

## 2017-05-02 RX ADMIN — KETOROLAC TROMETHAMINE 30 MG: 30 INJECTION, SOLUTION INTRAMUSCULAR at 18:24

## 2017-05-02 RX ADMIN — GABAPENTIN 600 MG: 300 CAPSULE ORAL at 20:32

## 2017-05-02 RX ADMIN — RISPERIDONE 0.5 MG: 0.5 TABLET, FILM COATED ORAL at 17:24

## 2017-05-02 RX ADMIN — ACETAZOLAMIDE 500 MG: 500 CAPSULE, EXTENDED RELEASE ORAL at 05:19

## 2017-05-02 RX ADMIN — STANDARDIZED SENNA CONCENTRATE AND DOCUSATE SODIUM 2 TABLET: 8.6; 5 TABLET, FILM COATED ORAL at 20:31

## 2017-05-02 RX ADMIN — HEPARIN SODIUM 5000 UNITS: 5000 INJECTION, SOLUTION INTRAVENOUS; SUBCUTANEOUS at 20:32

## 2017-05-02 RX ADMIN — DULOXETINE HYDROCHLORIDE 60 MG: 60 CAPSULE, DELAYED RELEASE ORAL at 08:08

## 2017-05-02 RX ADMIN — HEPARIN SODIUM 5000 UNITS: 5000 INJECTION, SOLUTION INTRAVENOUS; SUBCUTANEOUS at 05:13

## 2017-05-02 RX ADMIN — PREDNISONE 15 MG: 5 TABLET ORAL at 08:08

## 2017-05-02 RX ADMIN — DULOXETINE HYDROCHLORIDE 60 MG: 60 CAPSULE, DELAYED RELEASE ORAL at 20:32

## 2017-05-02 RX ADMIN — VALPROATE SODIUM 1000 MG: 100 INJECTION, SOLUTION INTRAVENOUS at 11:45

## 2017-05-02 RX ADMIN — TRAMADOL HYDROCHLORIDE 50 MG: 50 TABLET, COATED ORAL at 16:47

## 2017-05-02 RX ADMIN — KETOROLAC TROMETHAMINE 30 MG: 30 INJECTION, SOLUTION INTRAMUSCULAR at 11:14

## 2017-05-02 RX ADMIN — POTASSIUM CHLORIDE 40 MEQ: 1500 TABLET, EXTENDED RELEASE ORAL at 11:13

## 2017-05-02 RX ADMIN — DIPHENHYDRAMINE HYDROCHLORIDE 25 MG: 50 INJECTION, SOLUTION INTRAMUSCULAR; INTRAVENOUS at 20:33

## 2017-05-02 RX ADMIN — KETOROLAC TROMETHAMINE 30 MG: 30 INJECTION, SOLUTION INTRAMUSCULAR at 05:13

## 2017-05-02 RX ADMIN — THERA TABS 1 TABLET: TAB at 08:08

## 2017-05-02 RX ADMIN — STANDARDIZED SENNA CONCENTRATE AND DOCUSATE SODIUM 2 TABLET: 8.6; 5 TABLET, FILM COATED ORAL at 08:08

## 2017-05-02 RX ADMIN — DIPHENHYDRAMINE HYDROCHLORIDE 12.5 MG: 50 INJECTION, SOLUTION INTRAMUSCULAR; INTRAVENOUS at 05:13

## 2017-05-02 RX ADMIN — POTASSIUM CHLORIDE 40 MEQ: 1500 TABLET, EXTENDED RELEASE ORAL at 20:32

## 2017-05-02 RX ADMIN — MAGNESIUM GLUCONATE 500 MG ORAL TABLET 400 MG: 500 TABLET ORAL at 20:31

## 2017-05-02 RX ADMIN — LORAZEPAM 1 MG: 1 TABLET ORAL at 08:18

## 2017-05-02 RX ADMIN — ONDANSETRON 4 MG: 2 INJECTION INTRAMUSCULAR; INTRAVENOUS at 20:53

## 2017-05-02 RX ADMIN — LORAZEPAM 1 MG: 1 TABLET ORAL at 18:31

## 2017-05-02 RX ADMIN — VITAMIN D, TAB 1000IU (100/BT) 1000 UNITS: 25 TAB at 08:08

## 2017-05-02 RX ADMIN — MAGNESIUM GLUCONATE 500 MG ORAL TABLET 400 MG: 500 TABLET ORAL at 08:08

## 2017-05-02 RX ADMIN — GABAPENTIN 600 MG: 300 CAPSULE ORAL at 08:07

## 2017-05-02 RX ADMIN — LORAZEPAM 1 MG: 1 TABLET ORAL at 13:34

## 2017-05-02 RX ADMIN — MAGNESIUM GLUCONATE 500 MG ORAL TABLET 400 MG: 500 TABLET ORAL at 11:12

## 2017-05-02 RX ADMIN — CALCIUM CARBONATE 500 MG: 1250 TABLET ORAL at 16:47

## 2017-05-02 RX ADMIN — DIPHENHYDRAMINE HYDROCHLORIDE 25 MG: 50 INJECTION, SOLUTION INTRAMUSCULAR; INTRAVENOUS at 11:14

## 2017-05-02 RX ADMIN — CALCIUM CARBONATE 500 MG: 1250 TABLET ORAL at 08:08

## 2017-05-02 RX ADMIN — HEPARIN SODIUM 5000 UNITS: 5000 INJECTION, SOLUTION INTRAVENOUS; SUBCUTANEOUS at 13:21

## 2017-05-02 RX ADMIN — TRAMADOL HYDROCHLORIDE 50 MG: 50 TABLET, COATED ORAL at 08:08

## 2017-05-02 ASSESSMENT — PAIN SCALES - GENERAL
PAINLEVEL_OUTOF10: 10

## 2017-05-02 ASSESSMENT — ENCOUNTER SYMPTOMS
WEAKNESS: 0
CARDIOVASCULAR NEGATIVE: 1
RESPIRATORY NEGATIVE: 1
COUGH: 0
BRUISES/BLEEDS EASILY: 0
BACK PAIN: 1
HEADACHES: 1
HEARTBURN: 0
BLURRED VISION: 0
EYES NEGATIVE: 1
GASTROINTESTINAL NEGATIVE: 1
DIZZINESS: 0
DEPRESSION: 0
NECK PAIN: 0
PSYCHIATRIC NEGATIVE: 1
MYALGIAS: 1

## 2017-05-02 NOTE — PROGRESS NOTES
"Patient is crying and hysterical. Wants more pain medication. Doctor is aware and will not prescribe any more. Stay and reassured patient to help calm her down. Patient has since calmed down. Wants to know the name of the doctor who is, \"not helping her\" and said that, \"I don't want to miguel angel, but I will.\"  "

## 2017-05-02 NOTE — PROGRESS NOTES
Assumed care at 0700.  Complaining of pain 10/10.  Medicated per MAR.  Patient also c/o right sided numbness and headache.  Thoroughly assessed patient.  No other neurological deficits noted.  Tolerated morning meds well.  Skin and sacral assessment done.  1:1 sitter at bedside.  All needs met at this time.  Will continue to monitor.

## 2017-05-02 NOTE — DISCHARGE PLANNING
Faxed legal to [facility] for referrals to be sent to [facility].    Faxed legal for pt to go to court tomorrow.    Pts legal expires, but pt will be going to court. LM for psych inquiring whether they will see pt prior or not.    Faxed to  Sarah Carvalho's office for possible extension.

## 2017-05-02 NOTE — PROGRESS NOTES
"Pt's crying and upset saying that \"why can't they just give me the 50mg of Benadryl just like I take it at home.\" RN explained to pt about how medications can not be abruptly increased. \"But that's how I take it at home. Can you pls call the Neurology doctor because this headache is going to cause me to stroke out, tell him it's an emergency.\" RN informed pt that she will try her best to communicate with her doctors her concerns. Also pt, complained about her Tramadol is not doing anything for her pain. RN explained to pt that she will let hospitalist CAROLINA Norris know about her concerns. CAROLINA hospitalnaldo Norris informed and is talking to pt right at this time.   "

## 2017-05-02 NOTE — PROGRESS NOTES
Hospital Medicine Progress Note, Adult, Complex               Author: Drew Melo Date & Time created: 5/2/2017  9:40 AM     Interval History:  Notes ongoing headache, has been consistently requesting benadryl, toradol and ultram.  Will consult neurology, refuses imitrex.  Will replace K.    Review of Systems:  Review of Systems   HENT: Negative for hearing loss.    Eyes: Negative.  Negative for blurred vision.   Respiratory: Negative.  Negative for cough.    Cardiovascular: Negative.  Negative for chest pain.   Gastrointestinal: Negative.  Negative for heartburn.   Genitourinary: Negative.  Negative for dysuria.   Musculoskeletal: Positive for myalgias, back pain and joint pain. Negative for neck pain.   Skin: Negative for rash.   Neurological: Positive for headaches. Negative for dizziness and weakness.   Endo/Heme/Allergies: Negative.  Does not bruise/bleed easily.   Psychiatric/Behavioral: Negative.  Negative for depression.       Physical Exam:  Physical Exam  A&Ox3 dyed hair tired  PERRL EOMI mmm  Supple no jvd bruit or bharti  RRR no mrg  CTAB RENETTA  Soft abd ntnd bs+  No edema    Labs:        Invalid input(s): [id number]  Recent Labs      04/29/17   1409  04/29/17   1857  04/30/17   0026   TROPONINI  <0.01  <0.01  <0.01     Recent Labs      04/29/17   1409  05/02/17   0142   SODIUM  140  139   POTASSIUM  3.4*  3.0*   CHLORIDE  115*  107   CO2  16*  21   BUN  13  14   CREATININE  0.65  0.65   MAGNESIUM  1.9   --    CALCIUM  8.4*  9.5     Recent Labs      04/29/17   1409  05/02/17   0142   ALTSGPT  37   --    ASTSGOT  15   --    ALKPHOSPHAT  77   --    TBILIRUBIN  0.4   --    GLUCOSE  93  85     Recent Labs      04/29/17   1409   RBC  4.30   HEMOGLOBIN  13.9   HEMATOCRIT  40.3   PLATELETCT  362   PROTHROMBTM  14.1   APTT  25.2   INR  1.06     Recent Labs      04/29/17   1409   WBC  9.6   NEUTSPOLYS  86.20*   LYMPHOCYTES  10.70*   MONOCYTES  2.40   EOSINOPHILS  0.00   BASOPHILS  0.30   ASTSGOT  15   ALTSGPT   37   ALKPHOSPHAT  77   TBILIRUBIN  0.4           Hemodynamics:  Temp (24hrs), Av.6 °C (97.8 °F), Min:36.6 °C (97.8 °F), Max:36.6 °C (97.8 °F)  Temperature: 36.6 °C (97.8 °F)  Pulse  Av.2  Min: 68  Max: 146  Blood Pressure: 103/64 mmHg     Respiratory:    Respiration: 20, Pulse Oximetry: 96 %           Fluids:  No intake or output data in the 24 hours ending 17 0940     GI/Nutrition:  Orders Placed This Encounter   Procedures   • DIET ORDER     Standing Status: Standing      Number of Occurrences: 1      Standing Expiration Date:      Order Specific Question:  Diet:     Answer:  Regular [1]     Medical Decision Making, by Problem:  Active Hospital Problems    Diagnosis   •  Chest pain - pthal negative   • Suicidal ideation [R45.851] - sitter, says due to pain, legal hold   • Anxiety [F41.9] - prns   • Complicated migraine [G43.109] - will consult neurology, refuses imitrex.   • Hyperlipidemia [E78.5] - statin   • Metabolic acidosis [E87.2] - resolved   • Rheumatoid arthritis (CMS-HCC) [M06.9] - MTX, prednisone, sulfasalazine   • H/O: CVA (cerebrovascular accident) [Z86.73] - ASA, statin   • Chronic pain disorder [G89.4] - cymbalta, neurontin, prns   • S/P  shunt [Z98.2]   • Depression [F32.9] - cymbalta   Hypokalemia - replace, increase mag oxide    EKG reviewed, Labs reviewed, Medications reviewed and Radiology images reviewed  Lopez catheter: No Lopez      DVT Prophylaxis: Heparin    Ulcer prophylaxis: Yes

## 2017-05-02 NOTE — CARE PLAN
Problem: Safety  Goal: Will remain free from injury  Outcome: PROGRESSING AS EXPECTED  Pt on legal hold for SI, 1:1 sitter at bedside, milieu therapy done, pending psych consult    Problem: Pain Management  Goal: Pain level will decrease to patient’s comfort goal  Outcome: PROGRESSING SLOWER THAN EXPECTED  Pt complained of pain, medicated with PRN pain medications see MAR, will continue to monitor for pain

## 2017-05-02 NOTE — DISCHARGE PLANNING
Received IP for extension of legal hold.     Faxed to Sarah Carvalho's Office. TC to [place], legal to be extended. Pt will appear in legal hold court tomorrow as well.

## 2017-05-02 NOTE — DISCHARGE PLANNING
Still no note, order or IP about pts legal hold continuance or d/c. Set to .    Advised by SW Supervision to contact Psych Supervision. Contacted waiting to hear.
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
## 2017-05-02 NOTE — DISCHARGE PLANNING
Received call from Lorri from [facility]. Pts legal hold expires today, pt must be seen by psych to determine if pts legal is continued or d/c.    Message for Dr. Puga regarding pt being seen for determination of d/c or extension. Waiting to hear back.

## 2017-05-02 NOTE — DISCHARGE PLANNING
Received legal hold paperwork with medical clearance from [facility]). Mental health referral sent to [facility].

## 2017-05-02 NOTE — CARE PLAN
Problem: Safety  Goal: Will remain free from injury  Outcome: PROGRESSING AS EXPECTED  Patient will remain free from injury/falls during stay. Call light within reach. Sitter, 1:1    Problem: Infection  Goal: Will remain free from infection  Outcome: PROGRESSING AS EXPECTED  Patient will remain free from infection during stay. Patient is afebrile at this time. Will continue to monitor.

## 2017-05-02 NOTE — PROGRESS NOTES
Assumed care at 1900 from day shift nurse. Bed side report and safety precautions in place. Call light within reach. Updated on plan of care. No pain at this time and resting comfortably. Sitter at bedside. 1:1. Bed covers over head.

## 2017-05-02 NOTE — DISCHARGE PLANNING
TC to Lorri with [facility]. Updated on status at this time. Lorri will try to get extension signed or information as to d/c of hold is that is decision.

## 2017-05-03 LAB
ANION GAP SERPL CALC-SCNC: 7 MMOL/L (ref 0–11.9)
BUN SERPL-MCNC: 13 MG/DL (ref 8–22)
CALCIUM SERPL-MCNC: 9.7 MG/DL (ref 8.5–10.5)
CHLORIDE SERPL-SCNC: 108 MMOL/L (ref 96–112)
CO2 SERPL-SCNC: 21 MMOL/L (ref 20–33)
CREAT SERPL-MCNC: 0.72 MG/DL (ref 0.5–1.4)
GFR SERPL CREATININE-BSD FRML MDRD: >60 ML/MIN/1.73 M 2
GLUCOSE SERPL-MCNC: 83 MG/DL (ref 65–99)
POTASSIUM SERPL-SCNC: 3.4 MMOL/L (ref 3.6–5.5)
SODIUM SERPL-SCNC: 136 MMOL/L (ref 135–145)

## 2017-05-03 PROCEDURE — A9270 NON-COVERED ITEM OR SERVICE: HCPCS | Performed by: HOSPITALIST

## 2017-05-03 PROCEDURE — A9270 NON-COVERED ITEM OR SERVICE: HCPCS | Performed by: INTERNAL MEDICINE

## 2017-05-03 PROCEDURE — 700111 HCHG RX REV CODE 636 W/ 250 OVERRIDE (IP): Performed by: INTERNAL MEDICINE

## 2017-05-03 PROCEDURE — 36415 COLL VENOUS BLD VENIPUNCTURE: CPT

## 2017-05-03 PROCEDURE — A9270 NON-COVERED ITEM OR SERVICE: HCPCS | Performed by: PSYCHIATRY & NEUROLOGY

## 2017-05-03 PROCEDURE — 700102 HCHG RX REV CODE 250 W/ 637 OVERRIDE(OP): Performed by: INTERNAL MEDICINE

## 2017-05-03 PROCEDURE — 80048 BASIC METABOLIC PNL TOTAL CA: CPT

## 2017-05-03 PROCEDURE — 96376 TX/PRO/DX INJ SAME DRUG ADON: CPT

## 2017-05-03 PROCEDURE — 700102 HCHG RX REV CODE 250 W/ 637 OVERRIDE(OP): Performed by: HOSPITALIST

## 2017-05-03 PROCEDURE — 99225 PR SUBSEQUENT OBSERVATION CARE,LEVEL II: CPT | Performed by: INTERNAL MEDICINE

## 2017-05-03 PROCEDURE — G0378 HOSPITAL OBSERVATION PER HR: HCPCS

## 2017-05-03 PROCEDURE — A9270 NON-COVERED ITEM OR SERVICE: HCPCS | Performed by: NURSE PRACTITIONER

## 2017-05-03 PROCEDURE — 700102 HCHG RX REV CODE 250 W/ 637 OVERRIDE(OP): Performed by: NURSE PRACTITIONER

## 2017-05-03 PROCEDURE — 700111 HCHG RX REV CODE 636 W/ 250 OVERRIDE (IP)

## 2017-05-03 PROCEDURE — 700102 HCHG RX REV CODE 250 W/ 637 OVERRIDE(OP): Performed by: PSYCHIATRY & NEUROLOGY

## 2017-05-03 PROCEDURE — 700111 HCHG RX REV CODE 636 W/ 250 OVERRIDE (IP): Performed by: HOSPITALIST

## 2017-05-03 RX ORDER — ECHINACEA PURPUREA EXTRACT 125 MG
2 TABLET ORAL PRN
Status: DISCONTINUED | OUTPATIENT
Start: 2017-05-03 | End: 2017-05-05 | Stop reason: HOSPADM

## 2017-05-03 RX ORDER — PROMETHAZINE HYDROCHLORIDE 25 MG/1
25 TABLET ORAL ONCE
Status: COMPLETED | OUTPATIENT
Start: 2017-05-03 | End: 2017-05-03

## 2017-05-03 RX ORDER — DIHYDROERGOTAMINE MESYLATE 1 MG/ML
0.5 INJECTION, SOLUTION INTRAMUSCULAR; INTRAVENOUS; SUBCUTANEOUS ONCE
Status: COMPLETED | OUTPATIENT
Start: 2017-05-03 | End: 2017-05-03

## 2017-05-03 RX ADMIN — STANDARDIZED SENNA CONCENTRATE AND DOCUSATE SODIUM 2 TABLET: 8.6; 5 TABLET, FILM COATED ORAL at 09:08

## 2017-05-03 RX ADMIN — MAGNESIUM GLUCONATE 500 MG ORAL TABLET 400 MG: 500 TABLET ORAL at 21:32

## 2017-05-03 RX ADMIN — DULOXETINE HYDROCHLORIDE 60 MG: 60 CAPSULE, DELAYED RELEASE ORAL at 21:32

## 2017-05-03 RX ADMIN — FOLIC ACID 1 MG: 1 TABLET ORAL at 09:09

## 2017-05-03 RX ADMIN — KETOROLAC TROMETHAMINE 30 MG: 30 INJECTION, SOLUTION INTRAMUSCULAR at 16:38

## 2017-05-03 RX ADMIN — DULOXETINE HYDROCHLORIDE 60 MG: 60 CAPSULE, DELAYED RELEASE ORAL at 09:09

## 2017-05-03 RX ADMIN — ACETAMINOPHEN 650 MG: 325 TABLET, FILM COATED ORAL at 21:32

## 2017-05-03 RX ADMIN — CALCIUM CARBONATE 500 MG: 1250 TABLET ORAL at 05:41

## 2017-05-03 RX ADMIN — HEPARIN SODIUM 5000 UNITS: 5000 INJECTION, SOLUTION INTRAVENOUS; SUBCUTANEOUS at 13:35

## 2017-05-03 RX ADMIN — ASPIRIN 81 MG: 81 TABLET ORAL at 09:10

## 2017-05-03 RX ADMIN — OMEPRAZOLE 20 MG: 20 CAPSULE, DELAYED RELEASE ORAL at 09:08

## 2017-05-03 RX ADMIN — KETOROLAC TROMETHAMINE 30 MG: 30 INJECTION, SOLUTION INTRAMUSCULAR at 04:08

## 2017-05-03 RX ADMIN — KETOROLAC TROMETHAMINE 30 MG: 30 INJECTION, SOLUTION INTRAMUSCULAR at 10:31

## 2017-05-03 RX ADMIN — SULFASALAZINE 1000 MG: 500 TABLET ORAL at 00:23

## 2017-05-03 RX ADMIN — FERROUS GLUCONATE 324 MG: 324 TABLET ORAL at 16:29

## 2017-05-03 RX ADMIN — POTASSIUM CHLORIDE 40 MEQ: 1500 TABLET, EXTENDED RELEASE ORAL at 09:08

## 2017-05-03 RX ADMIN — SALINE NASAL SPRAY 2 SPRAY: 1.5 SOLUTION NASAL at 17:36

## 2017-05-03 RX ADMIN — ACETAZOLAMIDE 500 MG: 500 CAPSULE, EXTENDED RELEASE ORAL at 16:29

## 2017-05-03 RX ADMIN — LORAZEPAM 1 MG: 1 TABLET ORAL at 00:24

## 2017-05-03 RX ADMIN — SIMVASTATIN 40 MG: 40 TABLET, FILM COATED ORAL at 21:32

## 2017-05-03 RX ADMIN — ACETAZOLAMIDE 500 MG: 500 CAPSULE, EXTENDED RELEASE ORAL at 05:41

## 2017-05-03 RX ADMIN — GABAPENTIN 600 MG: 300 CAPSULE ORAL at 21:32

## 2017-05-03 RX ADMIN — STANDARDIZED SENNA CONCENTRATE AND DOCUSATE SODIUM 2 TABLET: 8.6; 5 TABLET, FILM COATED ORAL at 21:32

## 2017-05-03 RX ADMIN — DIPHENHYDRAMINE HYDROCHLORIDE 50 MG: 50 INJECTION, SOLUTION INTRAMUSCULAR; INTRAVENOUS at 16:38

## 2017-05-03 RX ADMIN — RISPERIDONE 0.5 MG: 0.5 TABLET, FILM COATED ORAL at 21:32

## 2017-05-03 RX ADMIN — TRAMADOL HYDROCHLORIDE 50 MG: 50 TABLET, COATED ORAL at 14:26

## 2017-05-03 RX ADMIN — CALCIUM CARBONATE 500 MG: 1250 TABLET ORAL at 16:29

## 2017-05-03 RX ADMIN — LORAZEPAM 1 MG: 1 TABLET ORAL at 09:09

## 2017-05-03 RX ADMIN — GABAPENTIN 600 MG: 300 CAPSULE ORAL at 09:09

## 2017-05-03 RX ADMIN — ZOLPIDEM TARTRATE 5 MG: 5 TABLET, FILM COATED ORAL at 21:32

## 2017-05-03 RX ADMIN — MAGNESIUM GLUCONATE 500 MG ORAL TABLET 400 MG: 500 TABLET ORAL at 09:09

## 2017-05-03 RX ADMIN — PROMETHAZINE HYDROCHLORIDE 25 MG: 25 TABLET ORAL at 13:39

## 2017-05-03 RX ADMIN — VITAMIN D, TAB 1000IU (100/BT) 1000 UNITS: 25 TAB at 09:09

## 2017-05-03 RX ADMIN — HEPARIN SODIUM 5000 UNITS: 5000 INJECTION, SOLUTION INTRAVENOUS; SUBCUTANEOUS at 05:41

## 2017-05-03 RX ADMIN — SULFASALAZINE 1000 MG: 500 TABLET ORAL at 09:09

## 2017-05-03 RX ADMIN — FERROUS GLUCONATE 324 MG: 324 TABLET ORAL at 05:41

## 2017-05-03 RX ADMIN — RISPERIDONE 0.5 MG: 0.5 TABLET, FILM COATED ORAL at 09:09

## 2017-05-03 RX ADMIN — DIPHENHYDRAMINE HYDROCHLORIDE 50 MG: 50 INJECTION, SOLUTION INTRAMUSCULAR; INTRAVENOUS at 04:01

## 2017-05-03 RX ADMIN — THERA TABS 1 TABLET: TAB at 09:09

## 2017-05-03 RX ADMIN — DIPHENHYDRAMINE HYDROCHLORIDE 50 MG: 50 INJECTION, SOLUTION INTRAMUSCULAR; INTRAVENOUS at 22:53

## 2017-05-03 RX ADMIN — DIPHENHYDRAMINE HYDROCHLORIDE 50 MG: 50 INJECTION, SOLUTION INTRAMUSCULAR; INTRAVENOUS at 10:31

## 2017-05-03 RX ADMIN — KETOROLAC TROMETHAMINE 30 MG: 30 INJECTION, SOLUTION INTRAMUSCULAR at 22:50

## 2017-05-03 RX ADMIN — DIHYDROERGOTAMINE MESYLATE 0.5 MG: 1 INJECTION, SOLUTION INTRAMUSCULAR; INTRAVENOUS; SUBCUTANEOUS at 14:12

## 2017-05-03 RX ADMIN — KETOROLAC TROMETHAMINE 30 MG: 30 INJECTION, SOLUTION INTRAMUSCULAR at 22:53

## 2017-05-03 RX ADMIN — PREDNISONE 15 MG: 5 TABLET ORAL at 09:09

## 2017-05-03 RX ADMIN — HEPARIN SODIUM 5000 UNITS: 5000 INJECTION, SOLUTION INTRAVENOUS; SUBCUTANEOUS at 21:32

## 2017-05-03 RX ADMIN — LORAZEPAM 1 MG: 1 TABLET ORAL at 04:50

## 2017-05-03 ASSESSMENT — ENCOUNTER SYMPTOMS
DEPRESSION: 0
EYES NEGATIVE: 1
GASTROINTESTINAL NEGATIVE: 1
COUGH: 0
HEADACHES: 1
BACK PAIN: 1
NECK PAIN: 0
WEAKNESS: 0
HEARTBURN: 0
RESPIRATORY NEGATIVE: 1
CARDIOVASCULAR NEGATIVE: 1
MYALGIAS: 1
BLURRED VISION: 0
PSYCHIATRIC NEGATIVE: 1
BRUISES/BLEEDS EASILY: 0
DIZZINESS: 0

## 2017-05-03 ASSESSMENT — PAIN SCALES - GENERAL
PAINLEVEL_OUTOF10: 9
PAINLEVEL_OUTOF10: 8
PAINLEVEL_OUTOF10: 10
PAINLEVEL_OUTOF10: 8

## 2017-05-03 NOTE — CARE PLAN
Problem: Safety  Goal: Will remain free from injury  Outcome: PROGRESSING AS EXPECTED  Pt on legal hold, 1:1 sitter in place.    Problem: Pain Management  Goal: Pain level will decrease to patient’s comfort goal  Outcome: PROGRESSING SLOWER THAN EXPECTED  Pt receiving multiple PRN pain medications, continuously asking to have medications earlier than when they are due.     Problem: Psychosocial Needs:  Goal: Level of anxiety will decrease  Outcome: PROGRESSING AS EXPECTED  Psych involved in pt's care, sitting and talking with pt seems to decrease her anxiety.

## 2017-05-03 NOTE — PROGRESS NOTES
"Pt reports no headache relief from DHE, states that \"it usually takes about three doses to work\". Pt has not asked to use TENS unit at all this shift.  "

## 2017-05-03 NOTE — PROGRESS NOTES
Patient A&O x 4.  Withdrawn, flat affect.  C/O pain 8/10 to head and shoulders.  Non-pharm measures used at this time.  Pt asking if IV Benadryl and IV Toradol can be given early - this RN explained to pt that those medications are due in one hour and can be given then.  Pt on legal hold with 1:1 sitter.  Up self, steady gait

## 2017-05-03 NOTE — PROGRESS NOTES
Received call from Dr. Kwan. Orders to increase Benadryl IV to 50mg q6H. Orders placed. Notified MD of pt's concern about her stroking out. Per MD pt is not likely to stroke out. No new orders received.

## 2017-05-03 NOTE — DISCHARGE PLANNING
Medical Social Work    Referral: Legal hold court    Intervention: Pt presented for legal hold evaluation with . Recommendation per  is pt’s legal hold will be continued for one week (5/10/2017). Updated bedside RN and unit SW.     Plan: As above, pt’s legal hold has been extended for one week (5/10/2017). Unit SW to continue to pursue placement at inpatient  facility.

## 2017-05-03 NOTE — CONSULTS
DATE OF SERVICE:  05/02/2017    NEUROLOGIC CONSULTATION    REFERRING PHYSICIAN:  Drew Melo MD    HISTORY OF PRESENT ILLNESS:  I am asked to see this 46-year-old lady with a   long history of chronic headache for which she has an occipital nerve   stimulator in place and has multiple other medical issues including autoimmune   disorder.  She has been seen by a number of neurologists in the past   including Dr. Ramos, Dr. Mart, etc., and is UNABLE TO TOLERATE ANY OF THE   SUMATRIPTAN OR TRIPTAN DRUGS, ALL OF WHICH CAUSE CARDIAC ARREST.  She normally   treats her severe headaches with Toradol injection combined with Benadryl.    She has a chronic pain disorder for which she is on Cymbalta and Neurontin.    She is on maintenance doses of prednisone and sulfasalazine and is currently   in the hospital on a legal hold because of suicidal ideation related to   chronic pain.  Laboratory studies are unremarkable save for a low potassium   level which had been improved ___.  It is not clear whether the occipital   nerve stimulator is still effective, although its battery has been replaced in   2015.  She supposedly had some sort of pituitary abnormality, but that has   not been verified.  She has a history of anxiety and depression.  Because of   the stimulator, she cannot have an MRI study.  She has had CT scans in the   past that were unremarkable save for the presence of electrodes for the   stimulator.  She is generally followed by rheumatology for seronegative   rheumatoid arthritis.  She has had a lumbar shunt removed in the past,   apparently for some form of hydrocephalus.      Currently, she is complaining of severe headache and is somewhat photophobic.    Speech and language function appears to be intact.  Fundi are normal, and   visual fields are full.  Extraocular movements are intact.  Strength in the   upper and lower extremities is normal.  Coordination is intact.  There is no   clear cut sensory  deficit to pinprick, light touch, and double simultaneous   tactile stimulation.    IMPRESSION AND PLAN:  She has chronic headache and chronic pain, and I think   the best thing to do initially would be to try a valproic acid infusion to see   if that will break her headache; 1000 mg given relatively quickly may be   sufficient.  If that is not sufficient, then the next course would be to use a   dihydroergotamine infusion which she has apparently tolerated in the past,   and that would be given as an initial dose of 0.5 mg dihydroergotamine,   preceded by 25 mg of Phenergan, and then if that is well tolerated, 1 mg of   dihydroergotamine and Phenergan every day for 3 days.  I would be happy to   follow up with her and with Dr. Hobbs regarding the success of the valproic   acid infusion.    Thank you for allowing me to see her.       ____________________________________     MD TETO GOLDEN / NTS    DD:  05/02/2017 11:10:01  DT:  05/02/2017 16:07:39    D#:  [id number]  Job#:  [id number]    cc: CALI HOBBS MD

## 2017-05-03 NOTE — PROGRESS NOTES
"Pt asking to have Dilaudid with the DHE administration, this RN asked MD Melo - no new orders received. Pt also asking if she can \"at least have the Toradol and Benadryl earlier\". This RN reminded pt that both medications are not due until 1630 and this RN will bring them in then.  "

## 2017-05-03 NOTE — PROGRESS NOTES
Awaiting call back from Dr. Kwan. Pt also notified that she can have her TENS remote with an RN around but RN has to get the remote back for her safety. Also notified her that she can ask RN anytime about her remote for her TENS but it has to be monitored and taken back as soon as she is done with it.

## 2017-05-03 NOTE — PROGRESS NOTES
Patient alert and oriented. Complains of shoulder and back pain. Day nurse gave benadryl 25mg, tramadol 50mg, and ativan 1mg. Around 1830. Patient still wanting more. Benadryl was changed to 50mg Q6h after 25mg was given. Spoke with charge nurse to discuss if it would be appropriate to give an additional 25mg to equal 50mg of new dose. Gave ok. Administered to patient and explained she wouldn't be due for the full 50mg of benadryl at once until 6 hours from the time of the last dose. Stated understanding. Psych following patient. 1:1 sitter at bedside.

## 2017-05-03 NOTE — PROGRESS NOTES
Per Dr. Puga, nursing can page her until 12MN for any dosing questions about pt's psych medications, #[phone]. Will relay to NOC shift.

## 2017-05-03 NOTE — PROGRESS NOTES
Hospital Medicine Progress Note, Adult, Complex               Author: Drew Melo Date & Time created: 5/3/2017  11:26 AM     Interval History:  Severe poor histrionic behavior yesterday, legal hold continued.  I have no indication for ativan at this time though, will stop this as it seems to be providing a reward to her for poor behavior.  She still complains of headache, says no relief from depakote yesterday, so will follow Dr. Kwan's recommendation and give trial of DHE.     Review of Systems:  Review of Systems   HENT: Negative for hearing loss.    Eyes: Negative.  Negative for blurred vision.   Respiratory: Negative.  Negative for cough.    Cardiovascular: Negative.  Negative for chest pain.   Gastrointestinal: Negative.  Negative for heartburn.   Genitourinary: Negative.  Negative for dysuria.   Musculoskeletal: Positive for myalgias, back pain and joint pain. Negative for neck pain.   Skin: Negative for rash.   Neurological: Positive for headaches. Negative for dizziness and weakness.   Endo/Heme/Allergies: Negative.  Does not bruise/bleed easily.   Psychiatric/Behavioral: Negative.  Negative for depression.       Physical Exam:  Physical Exam  A&Ox3 dyed hair tired  PERRL EOMI mmm  Supple no jvd bruit or bharti  RRR no mrg  CTAB RENETTA  Soft abd ntnd bs+  No edema    Labs:        Invalid input(s): [id number]      Recent Labs      17   0142  17   0154   SODIUM  139  136   POTASSIUM  3.0*  3.4*   CHLORIDE  107  108   CO2  21  21   BUN  14  13   CREATININE  0.65  0.72   CALCIUM  9.5  9.7     Recent Labs      17   0142  17   0154   GLUCOSE  85  83     No results for input(s): RBC, HEMOGLOBIN, HEMATOCRIT, PLATELETCT, PROTHROMBTM, APTT, INR, IRON, FERRITIN, TOTIRONBC in the last 72 hours.            Hemodynamics:  Temp (24hrs), Av.7 °C (98.1 °F), Min:36.5 °C (97.7 °F), Max:36.9 °C (98.5 °F)  Temperature: 36.9 °C (98.5 °F)  Pulse  Av.7  Min: 68  Max: 146  Blood Pressure: 117/66  mmHg     Respiratory:    Respiration: 18, Pulse Oximetry: 91 %           Fluids:    Intake/Output Summary (Last 24 hours) at 05/03/17 1126  Last data filed at 05/03/17 1020   Gross per 24 hour   Intake    980 ml   Output      0 ml   Net    980 ml        GI/Nutrition:  Orders Placed This Encounter   Procedures   • DIET ORDER     Standing Status: Standing      Number of Occurrences: 1      Standing Expiration Date:      Order Specific Question:  Diet:     Answer:  Regular [1]     Medical Decision Making, by Problem:  Active Hospital Problems    Diagnosis   •  Chest pain - pthal negative   • Suicidal ideation [R45.851] - sitter, says due to pain, legal hold   • Anxiety [F41.9] - prns   • Complicated migraine [G43.109] - follow neurology recs, start DHE trial, failed valproic acid load, refuses imitrex.   • Hyperlipidemia [E78.5] - statin   • Metabolic acidosis [E87.2] - resolved   • Rheumatoid arthritis (CMS-HCC) [M06.9] - MTX, prednisone, sulfasalazine   • H/O: CVA (cerebrovascular accident) [Z86.73] - ASA, statin   • Chronic pain disorder [G89.4] - cymbalta, neurontin, prns   • S/P  shunt [Z98.2]   • Depression [F32.9] - cymbalta   Hypokalemia - replace, mag oxide    EKG reviewed, Labs reviewed, Medications reviewed and Radiology images reviewed  Lopez catheter: No Lopez      DVT Prophylaxis: Heparin    Ulcer prophylaxis: Yes

## 2017-05-03 NOTE — PSYCHIATRY
"PSYCHIATRIC FOLLOW UP:    Reason for Admission: Pain  Legal hold status:   Legal hold  Psychiatric Supervising Attending:     Dr. Remedios Puga M.D.    HPI: The patient reports no acute overnight events. States that she feels better this AM and denies being hopeless this morning. However, she usually will feel good in the morning, but throughout the day the pain will increase. Denies being suicidal \"right now I do not have thoughts of suicide, but as the pain gets worse in the day I might start thinking of it again.\" States she is only eating very little for fear of vomiting or dumping. States that the ondansetron that she receives helps \"a little.\"         Psychiatric Examination: observed phenomenon:  Vitals:  Filed Vitals:    05/02/17 1330 05/02/17 1631 05/02/17 2000 05/03/17 0400   BP: 127/80 133/69 121/82 123/83   Pulse: 109 100 101 101   Temp:  36.5 °C (97.7 °F) 36.7 °C (98 °F) 36.7 °C (98.1 °F)   Resp:  18 18 17   Height:       Weight:       SpO2:  95% 95% 94%       Musculoskeletal(abnormal movements, gait, etc): No abnormal movements noted.  Appearance: Well developed, well nourished, with grooming appropriate for being in the hospital for several days,  woman with pink short hair, and sleeping in bed when entering for exam. .  Thoughts: Logical and linear this AM. Denies a/v hallucinations this AM.  Speech: Regular rate and rhythm. Normal tone, volume, and latency.  Mood:   \"Disheartened\"  Affect:  Neutral to dysphoric. Her behavior is calm and cooperative this AM, was not tearful, and smiled at the conclusion of the exam.  SI/HI:   Denies \"current\" SI, and HI.  Attention/Alertness:   Unable to perform serial 7's. 4/5 correct for serial 3's. Can spell \"world\" forwards and backwards.  Memory:    1/3 for word recall.  Orientation:    Alert and oriented to person, place, date, and situation.  Fund of Knowledge:    Appropriate.  Insight/Judgement into psychiatric symptoms: Fair insight. Poor " judgement.  Neurological Testing:( ie clock, cube drawing, MMSE, MOCA,etc.)    Medical systems reviewed: (pain, new complaint, etc)   Denies new complaints or concerns.    Lab results/tests:   ESTIMATED GFR [[id number]] Collected: 05/03/17 0154      Order Status: Completed Updated: 05/03/17 0247      GFR If African American >60 mL/min/1.73 m 2       GFR If Non African American >60 mL/min/1.73 m 2      BASIC METABOLIC PANEL [[id number]] (Abnormal) Collected: 05/03/17 0154     Order Status: Completed Specimen Information: Blood Updated: 05/03/17 0246      Sodium 136 mmol/L       Potassium 3.4 (L) mmol/L       Chloride 108 mmol/L       Co2 21 mmol/L       Glucose 83 mg/dL       Bun 13 mg/dL       Creatinine 0.72 mg/dL       Calcium 9.7 mg/dL       Anion Gap 7.0             Assessment:(acuity level)  # Mood disorder, unspecified  # Cognitive disorder, unspecified    The patient is much more calm this AM, and states that her mood has improved. Her benadryl was increased yesterday to the level she had been asking for, but does not endorse that it helped and was ambivalent when answering. She does state that while she has now been allowed use of her remote control (under supervision) she has not received it the two times she asked for it last night. Her ability to perform simple cognitive functioning tests is impaired with deficits in memory and concentration which is certainly concerning as she was not complaining of pain this AM which was her reason for not being able to focus yesterday.    Plan:    # Mood disorder, unspecified  - Risperidone 0.5 mg PO BID for mood.  # Cognitive disorder, unspecified  - Will consider performing MOCA.      legal hold: Continue legal hold  Records Reviewed: Yes.  Will follow/Signing off: Will follow.

## 2017-05-03 NOTE — TELEPHONE ENCOUNTER
Called patient spoke with Mr. Pang    Noted that this AM I spoke with Dr. Soni and he suggested getting a CT of the sinuses.  Will move forward with that.  He will see the patient in his office.    Mr. Pang reports he feels the disease has progressed    Order placed    Lost connection with Mr. Pang

## 2017-05-04 ENCOUNTER — APPOINTMENT (OUTPATIENT)
Dept: RADIOLOGY | Facility: MEDICAL CENTER | Age: 47
End: 2017-05-04
Attending: OTOLARYNGOLOGY

## 2017-05-04 DIAGNOSIS — M06.9 RHEUMATOID ARTHRITIS, INVOLVING UNSPECIFIED SITE, UNSPECIFIED RHEUMATOID FACTOR PRESENCE: ICD-10-CM

## 2017-05-04 LAB
BACTERIA BLD CULT: NORMAL
BACTERIA BLD CULT: NORMAL
SIGNIFICANT IND 70042: NORMAL
SIGNIFICANT IND 70042: NORMAL
SITE SITE: NORMAL
SITE SITE: NORMAL
SOURCE SOURCE: NORMAL
SOURCE SOURCE: NORMAL

## 2017-05-04 PROCEDURE — 96376 TX/PRO/DX INJ SAME DRUG ADON: CPT

## 2017-05-04 PROCEDURE — A9270 NON-COVERED ITEM OR SERVICE: HCPCS | Performed by: INTERNAL MEDICINE

## 2017-05-04 PROCEDURE — A9270 NON-COVERED ITEM OR SERVICE: HCPCS | Performed by: PSYCHIATRY & NEUROLOGY

## 2017-05-04 PROCEDURE — 700102 HCHG RX REV CODE 250 W/ 637 OVERRIDE(OP): Performed by: INTERNAL MEDICINE

## 2017-05-04 PROCEDURE — G0378 HOSPITAL OBSERVATION PER HR: HCPCS

## 2017-05-04 PROCEDURE — 99225 PR SUBSEQUENT OBSERVATION CARE,LEVEL II: CPT | Performed by: INTERNAL MEDICINE

## 2017-05-04 PROCEDURE — 700111 HCHG RX REV CODE 636 W/ 250 OVERRIDE (IP): Performed by: INTERNAL MEDICINE

## 2017-05-04 PROCEDURE — 700111 HCHG RX REV CODE 636 W/ 250 OVERRIDE (IP): Performed by: HOSPITALIST

## 2017-05-04 PROCEDURE — 700111 HCHG RX REV CODE 636 W/ 250 OVERRIDE (IP)

## 2017-05-04 PROCEDURE — 70486 CT MAXILLOFACIAL W/O DYE: CPT

## 2017-05-04 PROCEDURE — 700102 HCHG RX REV CODE 250 W/ 637 OVERRIDE(OP): Performed by: HOSPITALIST

## 2017-05-04 PROCEDURE — 700102 HCHG RX REV CODE 250 W/ 637 OVERRIDE(OP): Performed by: PSYCHIATRY & NEUROLOGY

## 2017-05-04 PROCEDURE — A9270 NON-COVERED ITEM OR SERVICE: HCPCS | Performed by: HOSPITALIST

## 2017-05-04 RX ORDER — DIHYDROERGOTAMINE MESYLATE 1 MG/ML
1 INJECTION, SOLUTION INTRAMUSCULAR; INTRAVENOUS; SUBCUTANEOUS ONCE
Status: COMPLETED | OUTPATIENT
Start: 2017-05-04 | End: 2017-05-04

## 2017-05-04 RX ORDER — PROMETHAZINE HYDROCHLORIDE 25 MG/1
25 TABLET ORAL ONCE
Status: COMPLETED | OUTPATIENT
Start: 2017-05-04 | End: 2017-05-04

## 2017-05-04 RX ADMIN — FERROUS GLUCONATE 324 MG: 324 TABLET ORAL at 17:10

## 2017-05-04 RX ADMIN — DULOXETINE HYDROCHLORIDE 60 MG: 60 CAPSULE, DELAYED RELEASE ORAL at 20:05

## 2017-05-04 RX ADMIN — HEPARIN SODIUM 5000 UNITS: 5000 INJECTION, SOLUTION INTRAVENOUS; SUBCUTANEOUS at 13:52

## 2017-05-04 RX ADMIN — FERROUS GLUCONATE 324 MG: 324 TABLET ORAL at 07:52

## 2017-05-04 RX ADMIN — METHOTREXATE 17.5 MG: 2.5 TABLET ORAL at 07:53

## 2017-05-04 RX ADMIN — STANDARDIZED SENNA CONCENTRATE AND DOCUSATE SODIUM 2 TABLET: 8.6; 5 TABLET, FILM COATED ORAL at 07:52

## 2017-05-04 RX ADMIN — CALCIUM CARBONATE 500 MG: 1250 TABLET ORAL at 17:10

## 2017-05-04 RX ADMIN — SULFASALAZINE 1000 MG: 500 TABLET ORAL at 08:00

## 2017-05-04 RX ADMIN — KETOROLAC TROMETHAMINE 30 MG: 30 INJECTION, SOLUTION INTRAMUSCULAR at 05:09

## 2017-05-04 RX ADMIN — OMEPRAZOLE 20 MG: 20 CAPSULE, DELAYED RELEASE ORAL at 07:52

## 2017-05-04 RX ADMIN — DIPHENHYDRAMINE HYDROCHLORIDE 50 MG: 50 INJECTION, SOLUTION INTRAMUSCULAR; INTRAVENOUS at 05:09

## 2017-05-04 RX ADMIN — GABAPENTIN 600 MG: 300 CAPSULE ORAL at 07:52

## 2017-05-04 RX ADMIN — RISPERIDONE 0.5 MG: 0.5 TABLET, FILM COATED ORAL at 20:05

## 2017-05-04 RX ADMIN — ACETAZOLAMIDE 500 MG: 500 CAPSULE, EXTENDED RELEASE ORAL at 05:10

## 2017-05-04 RX ADMIN — STANDARDIZED SENNA CONCENTRATE AND DOCUSATE SODIUM 2 TABLET: 8.6; 5 TABLET, FILM COATED ORAL at 20:05

## 2017-05-04 RX ADMIN — ACETAZOLAMIDE 500 MG: 500 CAPSULE, EXTENDED RELEASE ORAL at 17:10

## 2017-05-04 RX ADMIN — DIPHENHYDRAMINE HYDROCHLORIDE 50 MG: 50 INJECTION, SOLUTION INTRAMUSCULAR; INTRAVENOUS at 23:43

## 2017-05-04 RX ADMIN — KETOROLAC TROMETHAMINE 30 MG: 30 INJECTION, SOLUTION INTRAMUSCULAR at 11:20

## 2017-05-04 RX ADMIN — DIPHENHYDRAMINE HYDROCHLORIDE 50 MG: 50 INJECTION, SOLUTION INTRAMUSCULAR; INTRAVENOUS at 11:20

## 2017-05-04 RX ADMIN — KETOROLAC TROMETHAMINE 30 MG: 30 INJECTION, SOLUTION INTRAMUSCULAR at 23:43

## 2017-05-04 RX ADMIN — PROMETHAZINE HYDROCHLORIDE 25 MG: 25 TABLET ORAL at 13:05

## 2017-05-04 RX ADMIN — FOLIC ACID 1 MG: 1 TABLET ORAL at 07:52

## 2017-05-04 RX ADMIN — HEPARIN SODIUM 5000 UNITS: 5000 INJECTION, SOLUTION INTRAVENOUS; SUBCUTANEOUS at 20:05

## 2017-05-04 RX ADMIN — DIHYDROERGOTAMINE MESYLATE 1 MG: 1 INJECTION, SOLUTION INTRAMUSCULAR; INTRAVENOUS; SUBCUTANEOUS at 13:42

## 2017-05-04 RX ADMIN — ASPIRIN 81 MG: 81 TABLET ORAL at 07:52

## 2017-05-04 RX ADMIN — KETOROLAC TROMETHAMINE 30 MG: 30 INJECTION, SOLUTION INTRAMUSCULAR at 17:11

## 2017-05-04 RX ADMIN — SIMVASTATIN 40 MG: 40 TABLET, FILM COATED ORAL at 20:05

## 2017-05-04 RX ADMIN — DIPHENHYDRAMINE HYDROCHLORIDE 50 MG: 50 INJECTION, SOLUTION INTRAMUSCULAR; INTRAVENOUS at 17:11

## 2017-05-04 RX ADMIN — HEPARIN SODIUM 5000 UNITS: 5000 INJECTION, SOLUTION INTRAVENOUS; SUBCUTANEOUS at 05:09

## 2017-05-04 RX ADMIN — CALCIUM CARBONATE 500 MG: 1250 TABLET ORAL at 07:52

## 2017-05-04 RX ADMIN — SULFASALAZINE 1000 MG: 500 TABLET ORAL at 20:06

## 2017-05-04 RX ADMIN — DULOXETINE HYDROCHLORIDE 60 MG: 60 CAPSULE, DELAYED RELEASE ORAL at 07:52

## 2017-05-04 RX ADMIN — GABAPENTIN 600 MG: 300 CAPSULE ORAL at 20:05

## 2017-05-04 RX ADMIN — VITAMIN D, TAB 1000IU (100/BT) 1000 UNITS: 25 TAB at 07:52

## 2017-05-04 RX ADMIN — RISPERIDONE 0.5 MG: 0.5 TABLET, FILM COATED ORAL at 07:52

## 2017-05-04 RX ADMIN — THERA TABS 1 TABLET: TAB at 07:53

## 2017-05-04 RX ADMIN — PREDNISONE 15 MG: 5 TABLET ORAL at 07:52

## 2017-05-04 ASSESSMENT — PAIN SCALES - GENERAL
PAINLEVEL_OUTOF10: 9
PAINLEVEL_OUTOF10: 5
PAINLEVEL_OUTOF10: 10
PAINLEVEL_OUTOF10: 8
PAINLEVEL_OUTOF10: 7

## 2017-05-04 NOTE — PROGRESS NOTES
Gave patient remote to fluctuate TENS level while staying in the room with her. Educated patient on the importance of needing to keep the remote for her safety. Patient understanding.
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
## 2017-05-04 NOTE — PROGRESS NOTES
This RN received a call from Dionisio Soni M.D.  MD Soni states he is supposed to come and see pt, states that he will not come see pt until CT of the sinuses is complete. Received orders for CT Maxillofacial w/o plus recons.

## 2017-05-04 NOTE — DISCHARGE PLANNING
Medical Social Work  PC to [facility]: told waiting for medical doctor to review, then goes to psych to review.

## 2017-05-04 NOTE — PROGRESS NOTES
Patient stated that the doctor told her that she could her remote to her nerve stimulator. This statement was untrue. Patient was not given remote.

## 2017-05-04 NOTE — DISCHARGE PLANNING
Medical Social Work:  Rusty from [place] [facility] contacted MAYITO requesting a Neuro Consult. [facility]ITO faxed consult to Rusty at [place] [facility].

## 2017-05-04 NOTE — CARE PLAN
Problem: Discharge Barriers/Planning  Goal: Patient’s continuum of care needs will be met  Outcome: PROGRESSING AS EXPECTED  Pt on legal hold, 1:1 sitter in place.    Problem: Pain Management  Goal: Pain level will decrease to patient’s comfort goal  Outcome: PROGRESSING SLOWER THAN EXPECTED  Pt receiving PRN IV Benadryl and IV Toradol Q6.

## 2017-05-04 NOTE — PROGRESS NOTES
Assumed care of pt, A/OX4. VSS, on room air stating within normal limits. Patient ambulates by self. Has 1:1 sitter. Right AC IV SL. Patient c/o 8/10 pain in shoulder, rest/relaxation used. Hourly rounding set in place, call light within reach, bed is in lowest position. Patient refuses bed alarm since up and mobile despite education on needing it.

## 2017-05-04 NOTE — PROGRESS NOTES
MD Puga d/c'd legal hold for patient. MD Melo notified, stated we wont discharge until tomorrow so that the patient can complete her 3 day's of DHE.

## 2017-05-05 ENCOUNTER — PATIENT OUTREACH (OUTPATIENT)
Dept: HEALTH INFORMATION MANAGEMENT | Facility: OTHER | Age: 47
End: 2017-05-05

## 2017-05-05 VITALS
OXYGEN SATURATION: 93 % | TEMPERATURE: 98.8 F | SYSTOLIC BLOOD PRESSURE: 101 MMHG | WEIGHT: 160.27 LBS | RESPIRATION RATE: 19 BRPM | HEIGHT: 63 IN | HEART RATE: 104 BPM | DIASTOLIC BLOOD PRESSURE: 66 MMHG | BODY MASS INDEX: 28.4 KG/M2

## 2017-05-05 PROCEDURE — 700111 HCHG RX REV CODE 636 W/ 250 OVERRIDE (IP)

## 2017-05-05 PROCEDURE — 700111 HCHG RX REV CODE 636 W/ 250 OVERRIDE (IP): Performed by: INTERNAL MEDICINE

## 2017-05-05 PROCEDURE — 700102 HCHG RX REV CODE 250 W/ 637 OVERRIDE(OP): Performed by: INTERNAL MEDICINE

## 2017-05-05 PROCEDURE — 700102 HCHG RX REV CODE 250 W/ 637 OVERRIDE(OP): Performed by: NURSE PRACTITIONER

## 2017-05-05 PROCEDURE — 302151 K-PAD 14X20: Performed by: INTERNAL MEDICINE

## 2017-05-05 PROCEDURE — 96376 TX/PRO/DX INJ SAME DRUG ADON: CPT

## 2017-05-05 PROCEDURE — G0378 HOSPITAL OBSERVATION PER HR: HCPCS

## 2017-05-05 PROCEDURE — 700111 HCHG RX REV CODE 636 W/ 250 OVERRIDE (IP): Performed by: HOSPITALIST

## 2017-05-05 PROCEDURE — 700105 HCHG RX REV CODE 258: Performed by: NURSE PRACTITIONER

## 2017-05-05 PROCEDURE — A9270 NON-COVERED ITEM OR SERVICE: HCPCS | Performed by: PSYCHIATRY & NEUROLOGY

## 2017-05-05 PROCEDURE — A9270 NON-COVERED ITEM OR SERVICE: HCPCS | Performed by: INTERNAL MEDICINE

## 2017-05-05 PROCEDURE — 700102 HCHG RX REV CODE 250 W/ 637 OVERRIDE(OP): Performed by: HOSPITALIST

## 2017-05-05 PROCEDURE — 99217 PR OBSERVATION CARE DISCHARGE: CPT | Performed by: INTERNAL MEDICINE

## 2017-05-05 PROCEDURE — A9270 NON-COVERED ITEM OR SERVICE: HCPCS | Performed by: HOSPITALIST

## 2017-05-05 PROCEDURE — A9270 NON-COVERED ITEM OR SERVICE: HCPCS | Performed by: NURSE PRACTITIONER

## 2017-05-05 PROCEDURE — 700102 HCHG RX REV CODE 250 W/ 637 OVERRIDE(OP): Performed by: PSYCHIATRY & NEUROLOGY

## 2017-05-05 PROCEDURE — 302131 K PAD MOTOR: Performed by: INTERNAL MEDICINE

## 2017-05-05 RX ORDER — DIHYDROERGOTAMINE MESYLATE 1 MG/ML
1 INJECTION, SOLUTION INTRAMUSCULAR; INTRAVENOUS; SUBCUTANEOUS ONCE
Status: COMPLETED | OUTPATIENT
Start: 2017-05-05 | End: 2017-05-05

## 2017-05-05 RX ORDER — RISPERIDONE 0.5 MG/1
0.5 TABLET ORAL 2 TIMES DAILY
Qty: 60 TAB | Refills: 3 | Status: SHIPPED | OUTPATIENT
Start: 2017-05-05 | End: 2017-10-14

## 2017-05-05 RX ORDER — PROMETHAZINE HYDROCHLORIDE 25 MG/1
25 TABLET ORAL ONCE
Status: COMPLETED | OUTPATIENT
Start: 2017-05-05 | End: 2017-05-05

## 2017-05-05 RX ORDER — KETOROLAC TROMETHAMINE 30 MG/ML
30 INJECTION, SOLUTION INTRAMUSCULAR; INTRAVENOUS ONCE
Status: COMPLETED | OUTPATIENT
Start: 2017-05-05 | End: 2017-05-05

## 2017-05-05 RX ORDER — SODIUM CHLORIDE 9 MG/ML
INJECTION, SOLUTION INTRAVENOUS CONTINUOUS
Status: DISCONTINUED | OUTPATIENT
Start: 2017-05-05 | End: 2017-05-05 | Stop reason: HOSPADM

## 2017-05-05 RX ADMIN — SULFASALAZINE 1000 MG: 500 TABLET ORAL at 08:46

## 2017-05-05 RX ADMIN — ASPIRIN 81 MG: 81 TABLET ORAL at 08:46

## 2017-05-05 RX ADMIN — DIPHENHYDRAMINE HYDROCHLORIDE 50 MG: 50 INJECTION, SOLUTION INTRAMUSCULAR; INTRAVENOUS at 11:46

## 2017-05-05 RX ADMIN — KETOROLAC TROMETHAMINE 30 MG: 30 INJECTION, SOLUTION INTRAMUSCULAR at 11:46

## 2017-05-05 RX ADMIN — GABAPENTIN 600 MG: 300 CAPSULE ORAL at 08:46

## 2017-05-05 RX ADMIN — SODIUM CHLORIDE: 9 INJECTION, SOLUTION INTRAVENOUS at 00:25

## 2017-05-05 RX ADMIN — TRAMADOL HYDROCHLORIDE 50 MG: 50 TABLET, COATED ORAL at 00:57

## 2017-05-05 RX ADMIN — OMEPRAZOLE 20 MG: 20 CAPSULE, DELAYED RELEASE ORAL at 08:45

## 2017-05-05 RX ADMIN — FERROUS GLUCONATE 324 MG: 324 TABLET ORAL at 08:47

## 2017-05-05 RX ADMIN — ACETAZOLAMIDE 500 MG: 500 CAPSULE, EXTENDED RELEASE ORAL at 05:47

## 2017-05-05 RX ADMIN — CALCIUM CARBONATE 500 MG: 1250 TABLET ORAL at 08:47

## 2017-05-05 RX ADMIN — HEPARIN SODIUM 5000 UNITS: 5000 INJECTION, SOLUTION INTRAVENOUS; SUBCUTANEOUS at 05:47

## 2017-05-05 RX ADMIN — RISPERIDONE 0.5 MG: 0.5 TABLET, FILM COATED ORAL at 08:46

## 2017-05-05 RX ADMIN — DULOXETINE HYDROCHLORIDE 60 MG: 60 CAPSULE, DELAYED RELEASE ORAL at 08:46

## 2017-05-05 RX ADMIN — ZOLPIDEM TARTRATE 5 MG: 5 TABLET, FILM COATED ORAL at 00:57

## 2017-05-05 RX ADMIN — THERA TABS 1 TABLET: TAB at 08:47

## 2017-05-05 RX ADMIN — VITAMIN D, TAB 1000IU (100/BT) 1000 UNITS: 25 TAB at 08:46

## 2017-05-05 RX ADMIN — PREDNISONE 15 MG: 5 TABLET ORAL at 08:45

## 2017-05-05 RX ADMIN — PROMETHAZINE HYDROCHLORIDE 25 MG: 25 TABLET ORAL at 11:46

## 2017-05-05 RX ADMIN — DIPHENHYDRAMINE HYDROCHLORIDE 50 MG: 50 INJECTION, SOLUTION INTRAMUSCULAR; INTRAVENOUS at 05:47

## 2017-05-05 RX ADMIN — STANDARDIZED SENNA CONCENTRATE AND DOCUSATE SODIUM 2 TABLET: 8.6; 5 TABLET, FILM COATED ORAL at 08:46

## 2017-05-05 RX ADMIN — DIHYDROERGOTAMINE MESYLATE 1 MG: 1 INJECTION, SOLUTION INTRAMUSCULAR; INTRAVENOUS; SUBCUTANEOUS at 11:46

## 2017-05-05 RX ADMIN — KETOROLAC TROMETHAMINE 30 MG: 30 INJECTION, SOLUTION INTRAMUSCULAR at 05:47

## 2017-05-05 RX ADMIN — FOLIC ACID 1 MG: 1 TABLET ORAL at 08:47

## 2017-05-05 ASSESSMENT — PAIN SCALES - GENERAL
PAINLEVEL_OUTOF10: 7
PAINLEVEL_OUTOF10: 8
PAINLEVEL_OUTOF10: 10
PAINLEVEL_OUTOF10: 5

## 2017-05-05 NOTE — CARE PLAN
Problem: Safety  Goal: Will remain free from injury  Outcome: PROGRESSING AS EXPECTED  Call light within reach, treaded socks on, hourly rounding.    Problem: Pain Management  Goal: Pain level will decrease to patient’s comfort goal  Outcome: PROGRESSING AS EXPECTED  DHE day 3 on 5/5/17 for migraines

## 2017-05-05 NOTE — DISCHARGE PLANNING
Medical Social Work  PC to [facility], talked to Rae, told her patient is no longer on a Legal Hold.

## 2017-05-05 NOTE — PROGRESS NOTES
Received report and assumed care of pt. Pt A&Ox4, call light within reach, siderails upx2, treaded socks on, bed in low and locked position. Discussed poc with pt and spouse at bedside. Off legal hold. Ambulatory with steady gait. Personal belongings at bedside. Pt complaining of back, head, and shoulder pain and will medicate per MAR. Hourly rounding.

## 2017-05-05 NOTE — DISCHARGE INSTRUCTIONS
Discharge Instructions    Discharged to home by car with relative. Discharged via wheelchair, hospital escort: Yes.  Special equipment needed: Not Applicable    Be sure to schedule a follow-up appointment with your primary care doctor or any specialists as instructed.     Discharge Plan:   Influenza Vaccine Indication: Not indicated: Previously immunized this influenza season and > 8 years of age    I understand that a diet low in cholesterol, fat, and sodium is recommended for good health. Unless I have been given specific instructions below for another diet, I accept this instruction as my diet prescription.   Other diet: as tolerated     Special Instructions: None    · Is patient discharged on Warfarin / Coumadin?   No     · Is patient Post Blood Transfusion?  No    Depression / Suicide Risk    As you are discharged from this [facility] facility, it is important to learn how to keep safe from harming yourself.    Recognize the warning signs:  · Abrupt changes in personality, positive or negative- including increase in energy   · Giving away possessions  · Change in eating patterns- significant weight changes-  positive or negative  · Change in sleeping patterns- unable to sleep or sleeping all the time   · Unwillingness or inability to communicate  · Depression  · Unusual sadness, discouragement and loneliness  · Talk of wanting to die  · Neglect of personal appearance   · Rebelliousness- reckless behavior  · Withdrawal from people/activities they love  · Confusion- inability to concentrate     If you or a loved one observes any of these behaviors or has concerns about self-harm, here's what you can do:  · Talk about it- your feelings and reasons for harming yourself  · Remove any means that you might use to hurt yourself (examples: pills, rope, extension cords, firearm)  · Get professional help from the community (Mental Health, Substance Abuse, psychological counseling)  · Do not be alone:Call your Safe  Contact- someone whom you trust who will be there for you.  · Call your local CRISIS HOTLINE 644-2571 or 705-810-7307  · Call your local Children's Mobile Crisis Response Team [place] (406) 597-2560 or www.Libox  · Call the toll free National Suicide Prevention Hotlines   · National Suicide Prevention Lifeline 946-893-ZLND (8218)  · National MobSmith Line Network 800-SUICIDE (276-5035)

## 2017-05-05 NOTE — PROGRESS NOTES
Patient received, report taken at bedside, sitting up in bed, independent, alert and oriented x4 and able to make needs known, denies pain or discomfort at this time, call light placed within reach

## 2017-05-05 NOTE — PROGRESS NOTES
Patient discharged home with no needs, all discharge instructions, appointments, and prescriptions given, verbalizes understanding, up and ambulatory and refusing hospital escort/wheelchair out, steady gait

## 2017-05-05 NOTE — PROGRESS NOTES
Telephone with read back order for NS infusion @ 100 mL/h from [facility] due to pt complaining about feeling dehydrated. VSS.

## 2017-05-06 ENCOUNTER — APPOINTMENT (OUTPATIENT)
Dept: RADIOLOGY | Facility: MEDICAL CENTER | Age: 47
End: 2017-05-06
Attending: EMERGENCY MEDICINE

## 2017-05-06 ENCOUNTER — PATIENT OUTREACH (OUTPATIENT)
Dept: HEALTH INFORMATION MANAGEMENT | Facility: OTHER | Age: 47
End: 2017-05-06

## 2017-05-06 ENCOUNTER — HOSPITAL ENCOUNTER (EMERGENCY)
Facility: MEDICAL CENTER | Age: 47
End: 2017-05-06
Attending: EMERGENCY MEDICINE

## 2017-05-06 VITALS
WEIGHT: 156.53 LBS | OXYGEN SATURATION: 99 % | RESPIRATION RATE: 15 BRPM | BODY MASS INDEX: 27.73 KG/M2 | DIASTOLIC BLOOD PRESSURE: 89 MMHG | SYSTOLIC BLOOD PRESSURE: 128 MMHG | HEART RATE: 89 BPM | HEIGHT: 63 IN | TEMPERATURE: 97.5 F

## 2017-05-06 DIAGNOSIS — G44.59 OTHER COMPLICATED HEADACHE SYNDROME: ICD-10-CM

## 2017-05-06 DIAGNOSIS — F41.9 ANXIETY: ICD-10-CM

## 2017-05-06 DIAGNOSIS — F32.9 REACTIVE DEPRESSION: ICD-10-CM

## 2017-05-06 DIAGNOSIS — G89.4 CHRONIC PAIN SYNDROME: ICD-10-CM

## 2017-05-06 PROCEDURE — 99283 EMERGENCY DEPT VISIT LOW MDM: CPT

## 2017-05-06 NOTE — ED AVS SNAPSHOT
5/6/2017    Enedelia Pang  [address] [place] [address]    Dear Enedelia:    [facility] wants to ensure your discharge home is safe and you or your loved ones have had all of your questions answered regarding your care after you leave the hospital.    Below is a list of resources and contact information should you have any questions regarding your hospital stay, follow-up instructions, or active medical symptoms.    Questions or Concerns Regarding… Contact   Medical Questions Related to Your Discharge  (7 days a week, 8am-5pm) Contact a Nurse Care Coordinator   [phone]   Medical Questions Not Related to Your Discharge  (24 hours a day / 7 days a week)  Contact the Nurse Health Line   [phone]    Medications or Discharge Instructions Refer to your discharge packet   or contact your [facility] Primary Care Provider   [phone]   Follow-up Appointment(s) Schedule your appointment via ONTRAPORT   or contact Scheduling [phone]   Billing Review your statement via ONTRAPORT  or contact Billing [phone]   Medical Records Review your records via ONTRAPORT   or contact Medical Records [phone]     You may receive a telephone call within two days of discharge. This call is to make certain you understand your discharge instructions and have the opportunity to have any questions answered. You can also easily access your medical information, test results and upcoming appointments via the ONTRAPORT free online health management tool. You can learn more and sign up at RELEASEIF/ONTRAPORT. For assistance setting up your ONTRAPORT account, please call [phone].    Once again, we want to ensure your discharge home is safe and that you have a clear understanding of any next steps in your care. If you have any questions or concerns, please do not hesitate to contact us, we are here for you. Thank you for choosing [facility] for your healthcare needs.    Sincerely,    Your [facility] Healthcare Team

## 2017-05-06 NOTE — ED AVS SNAPSHOT
[employer] Access Code: Activation code not generated  Current [employer] Status: Active    [employer]  A secure, online tool to manage your health information     Angella Joy’s [employer]® is a secure, online tool that connects you to your personalized health information from the privacy of your home -- day or night - making it very easy for you to manage your healthcare. Once the activation process is completed, you can even access your medical information using the [employer] brice, which is available for free in the Apple Brice store or Google Play store.     [employer] provides the following levels of access (as shown below):   My Chart Features   [facility] Primary Care Doctor [facility]  Specialists [facility]  Urgent  Care Non-[facility]  Primary Care  Doctor   Email your healthcare team securely and privately 24/7 X X X X   Manage appointments: schedule your next appointment; view details of past/upcoming appointments X      Request prescription refills. X      View recent personal medical records, including lab and immunizations X X X X   View health record, including health history, allergies, medications X X X X   Read reports about your outpatient visits, procedures, consult and ER notes X X X X   See your discharge summary, which is a recap of your hospital and/or ER visit that includes your diagnosis, lab results, and care plan. X X       How to register for [employer]:  1. Go to  [email or web address].  2. Click on the Sign Up Now box, which takes you to the New Member Sign Up page. You will need to provide the following information:  a. Enter your [employer] Access Code exactly as it appears at the top of this page. (You will not need to use this code after you’ve completed the sign-up process. If you do not sign up before the expiration date, you must request a new code.)   b. Enter your date of birth.   c. Enter your home email address.   d. Click Submit, and follow the next screen’s instructions.  3. Create a [employer] ID. This will  be your [employer] login ID and cannot be changed, so think of one that is secure and easy to remember.  4. Create a [employer] password. You can change your password at any time.  5. Enter your Password Reset Question and Answer. This can be used at a later time if you forget your password.   6. Enter your e-mail address. This allows you to receive e-mail notifications when new information is available in Un[employer]k.  7. Click Sign Up. You can now view your health information.    For assistance activating your [employer] account, call [phone]

## 2017-05-06 NOTE — DISCHARGE INSTRUCTIONS
"Headaches, Frequently Asked Questions  MIGRAINE HEADACHES  Q: What is migraine? What causes it? How can I treat it?  A: Generally, migraine headaches begin as a dull ache. Then they develop into a constant, throbbing, and pulsating pain. You may experience pain at the temples. You may experience pain at the front or back of one or both sides of the head. The pain is usually accompanied by a combination of:  · Nausea.   · Vomiting.   · Sensitivity to light and noise.   Some people (about 15%) experience an aura (see below) before an attack. The cause of migraine is believed to be chemical reactions in the brain. Treatment for migraine may include over-the-counter or prescription medications. It may also include self-help techniques. These include relaxation training and biofeedback.   Q: What is an aura?  A: About 15% of people with migraine get an \"aura\". This is a sign of neurological symptoms that occur before a migraine headache. You may see wavy or jagged lines, dots, or flashing lights. You might experience tunnel vision or blind spots in one or both eyes. The aura can include visual or auditory hallucinations (something imagined). It may include disruptions in smell (such as strange odors), taste or touch. Other symptoms include:  · Numbness.   · A \"pins and needles\" sensation.   · Difficulty in recalling or speaking the correct word.   These neurological events may last as long as 60 minutes. These symptoms will fade as the headache begins.  Q: What is a trigger?  A: Certain physical or environmental factors can lead to or \"trigger\" a migraine. These include:  · Foods.   · Hormonal changes.   · Weather.   · Stress.   It is important to remember that triggers are different for everyone. To help prevent migraine attacks, you need to figure out which triggers affect you. Keep a headache diary. This is a good way to track triggers. The diary will help you talk to your healthcare professional about your " "condition.  Q: Does weather affect migraines?  A: Bright sunshine, hot, humid conditions, and drastic changes in barometric pressure may lead to, or \"trigger,\" a migraine attack in some people. But studies have shown that weather does not act as a trigger for everyone with migraines.  Q: What is the link between migraine and hormones?  A: Hormones start and regulate many of your body's functions. Hormones keep your body in balance within a constantly changing environment. The levels of hormones in your body are unbalanced at times. Examples are during menstruation, pregnancy, or menopause. That can lead to a migraine attack. In fact, about three quarters of all women with migraine report that their attacks are related to the menstrual cycle.   Q: Is there an increased risk of stroke for migraine sufferers?  A: The likelihood of a migraine attack causing a stroke is very remote. That is not to say that migraine sufferers cannot have a stroke associated with their migraines. In persons under age 40, the most common associated factor for stroke is migraine headache. But over the course of a person's normal life span, the occurrence of migraine headache may actually be associated with a reduced risk of dying from cerebrovascular disease due to stroke.   Q: What are acute medications for migraine?  A: Acute medications are used to treat the pain of the headache after it has started. Examples over-the-counter medications, NSAIDs, ergots, and triptans.   Q: What are the triptans?  A: Triptans are the newest class of abortive medications. They are specifically targeted to treat migraine. Triptans are vasoconstrictors. They moderate some chemical reactions in the brain. The triptans work on receptors in your brain. Triptans help to restore the balance of a neurotransmitter called serotonin. Fluctuations in levels of serotonin are thought to be a main cause of migraine.   Q: Are over-the-counter medications for migraine " "effective?  A: Over-the-counter, or \"OTC,\" medications may be effective in relieving mild to moderate pain and associated symptoms of migraine. But you should see your caregiver before beginning any treatment regimen for migraine.   Q: What are preventive medications for migraine?  A: Preventive medications for migraine are sometimes referred to as \"prophylactic\" treatments. They are used to reduce the frequency, severity, and length of migraine attacks. Examples of preventive medications include antiepileptic medications, antidepressants, beta-blockers, calcium channel blockers, and NSAIDs (nonsteroidal anti-inflammatory drugs).  Q: Why are anticonvulsants used to treat migraine?  A: During the past few years, there has been an increased interest in antiepileptic drugs for the prevention of migraine. They are sometimes referred to as \"anticonvulsants\". Both epilepsy and migraine may be caused by similar reactions in the brain.   Q: Why are antidepressants used to treat migraine?  A: Antidepressants are typically used to treat people with depression. They may reduce migraine frequency by regulating chemical levels, such as serotonin, in the brain.   Q: What alternative therapies are used to treat migraine?  A: The term \"alternative therapies\" is often used to describe treatments considered outside the scope of conventional Western medicine. Examples of alternative therapy include acupuncture, acupressure, and yoga. Another common alternative treatment is herbal therapy. Some herbs are believed to relieve headache pain. Always discuss alternative therapies with your caregiver before proceeding. Some herbal products contain arsenic and other toxins.  TENSION HEADACHES  Q: What is a tension-type headache? What causes it? How can I treat it?  A: Tension-type headaches occur randomly. They are often the result of temporary stress, anxiety, fatigue, or anger. Symptoms include soreness in your temples, a tightening " "band-like sensation around your head (a \"vice-like\" ache). Symptoms can also include a pulling feeling, pressure sensations, and lamont head and neck muscles. The headache begins in your forehead, temples, or the back of your head and neck. Treatment for tension-type headache may include over-the-counter or prescription medications. Treatment may also include self-help techniques such as relaxation training and biofeedback.  CLUSTER HEADACHES  Q: What is a cluster headache? What causes it? How can I treat it?  A: Cluster headache gets its name because the attacks come in groups. The pain arrives with little, if any, warning. It is usually on one side of the head. A tearing or bloodshot eye and a runny nose on the same side of the headache may also accompany the pain. Cluster headaches are believed to be caused by chemical reactions in the brain. They have been described as the most severe and intense of any headache type. Treatment for cluster headache includes prescription medication and oxygen.  SINUS HEADACHES  Q: What is a sinus headache? What causes it? How can I treat it?  A: When a cavity in the bones of the face and skull (a sinus) becomes inflamed, the inflammation will cause localized pain. This condition is usually the result of an allergic reaction, a tumor, or an infection. If your headache is caused by a sinus blockage, such as an infection, you will probably have a fever. An x-ray will confirm a sinus blockage. Your caregiver's treatment might include antibiotics for the infection, as well as antihistamines or decongestants.   REBOUND HEADACHES  Q: What is a rebound headache? What causes it? How can I treat it?  A: A pattern of taking acute headache medications too often can lead to a condition known as \"rebound headache.\" A pattern of taking too much headache medication includes taking it more than 2 days per week or in excessive amounts. That means more than the label or a caregiver advises. " With rebound headaches, your medications not only stop relieving pain, they actually begin to cause headaches. Doctors treat rebound headache by tapering the medication that is being overused. Sometimes your caregiver will gradually substitute a different type of treatment or medication. Stopping may be a challenge. Regularly overusing a medication increases the potential for serious side effects. Consult a caregiver if you regularly use headache medications more than 2 days per week or more than the label advises.  ADDITIONAL QUESTIONS AND ANSWERS  Q: What is biofeedback?  A: Biofeedback is a self-help treatment. Biofeedback uses special equipment to monitor your body's involuntary physical responses. Biofeedback monitors:  · Breathing.   · Pulse.   · Heart rate.   · Temperature.   · Muscle tension.   · Brain activity.   Biofeedback helps you refine and perfect your relaxation exercises. You learn to control the physical responses that are related to stress. Once the technique has been mastered, you do not need the equipment any more.  Q: Are headaches hereditary?  A: Four out of five (80%) of people that suffer report a family history of migraine. Scientists are not sure if this is genetic or a family predisposition. Despite the uncertainty, a child has a 50% chance of having migraine if one parent suffers. The child has a 75% chance if both parents suffer.   Q: Can children get headaches?  A: By the time they reach high school, most young people have experienced some type of headache. Many safe and effective approaches or medications can prevent a headache from occurring or stop it after it has begun.   Q: What type of doctor should I see to diagnose and treat my headache?  A: Start with your primary caregiver. Discuss his or her experience and approach to headaches. Discuss methods of classification, diagnosis, and treatment. Your caregiver may decide to recommend you to a headache specialist, depending upon  your symptoms or other physical conditions. Having diabetes, allergies, etc., may require a more comprehensive and inclusive approach to your headache. The National Headache Foundation will provide, upon request, a list of NHF physician members in your state.  Document Released: 03/09/2005 Document Revised: 03/11/2013 Document Reviewed: 08/17/2009  [employer]® Patient Information ©2013 [employer].

## 2017-05-06 NOTE — PROGRESS NOTES
"Pt given discharge information with appts. Pt signed discharge instructions and stated \" I'm never coming back here even if I'm dying\"   "

## 2017-05-06 NOTE — ED AVS SNAPSHOT
Home Care Instructions                                                                                                                Enedelia Pang   MRN: [id number]    Department:  [facility], Emergency Dept   Date of Visit:  5/6/2017            [facility], Emergency Dept    [address]    [place] [address]    Phone:  [phone]      You were seen by     Benoit Dickens D.O.      Your Diagnosis Was     Other complicated headache syndrome     G44.59       Follow-up Information     1. Schedule an appointment as soon as possible for a visit with Oli Oh III, M.D..    Specialty:  Neurosurgery    Contact information    [address]  [facility] [address]  [phone]        Medication Information     Review all of your home medications and newly ordered medications with your primary doctor and/or pharmacist as soon as possible. Follow medication instructions as directed by your doctor and/or pharmacist.     Please keep your complete medication list with you and share with your physician. Update the information when medications are discontinued, doses are changed, or new medications (including over-the-counter products) are added; and carry medication information at all times in the event of emergency situations.               Medication List      ASK your doctor about these medications        Instructions    Morning Afternoon Evening Bedtime    acetaZOLAMIDE  MG Cp12   Commonly known as:  DIAMOX        Take 500 mg by mouth 2 times a day.   Dose:  500 mg                        calcium carbonate 500 MG Tabs   Commonly known as:  OS-ZAIN 500        Take 1 Tab by mouth 2 times a day, with meals.   Dose:  500 mg                        Cholecalciferol 1000 UNIT Tabs   Commonly known as:  VITAMIND D3        Take 1 Tab by mouth every day.   Dose:  1000 Units                        diphenhydrAMINE 50 MG/ML Soln   Commonly known as:  BENADRYL        INJECT IM  EVERY 6 HOURS AS NEEDED FOR ACUTE, SEVERE MIGRAINE HEADACHE AS DIRECTED                        duloxetine 60 MG Cpep delayed-release capsule   Commonly known as:  CYMBALTA        Take 60 mg by mouth 2 times a day.   Dose:  60 mg                        ferrous gluconate 324 (38 FE) MG Tabs   Commonly known as:  FERGON        Take 1 Tab by mouth 2 times a day, with meals.   Dose:  324 mg                        folic acid 1 MG Tabs   Commonly known as:  FOLVITE        Take 1 Tab by mouth every day.   Dose:  1 mg                        gabapentin 600 MG tablet   Commonly known as:  NEURONTIN        Take 600 mg by mouth 2 times a day.   Dose:  600 mg                        ketorolac 60 MG/2ML Soln   Commonly known as:  TORADOL        30 mg by Intramuscular route Once PRN (migraines).   Dose:  30 mg                        MAGNESIUM OXIDE PO        Take 1 Tab by mouth every day.   Dose:  1 Tab                        methotrexate 2.5 MG Tabs        Take 7 Tabs by mouth every thursday.   Dose:  17.5 mg                        MULTIVITAMIN ADULT PO        Take 1 Tab by mouth every day.   Dose:  1 Tab                        omeprazole 20 MG delayed-release capsule   Commonly known as:  PRILOSEC        Take 1 Cap by mouth every day.   Dose:  20 mg                        oxycodone immediate release 10 MG immediate release tablet   Commonly known as:  ROXICODONE        Take 1 Tab by mouth every four hours as needed for Moderate Pain.   Dose:  10 mg                        predniSONE 5 MG Tabs   Commonly known as:  DELTASONE        Take 15 mg by mouth every day.   Dose:  15 mg                        risperidone 0.5 MG Tabs   Commonly known as:  RISPERDAL        Take 1 Tab by mouth 2 Times a Day.   Dose:  0.5 mg                        sulfaSALAzine 500 MG Tabs   Commonly known as:  AZULFIDINE        Take 2 Tabs by mouth 2 times a day.   Dose:  1000 mg                                  Discharge Instructions       Headaches, Frequently  "Asked Questions  MIGRAINE HEADACHES  Q: What is migraine? What causes it? How can I treat it?  A: Generally, migraine headaches begin as a dull ache. Then they develop into a constant, throbbing, and pulsating pain. You may experience pain at the temples. You may experience pain at the front or back of one or both sides of the head. The pain is usually accompanied by a combination of:  · Nausea.   · Vomiting.   · Sensitivity to light and noise.   Some people (about 15%) experience an aura (see below) before an attack. The cause of migraine is believed to be chemical reactions in the brain. Treatment for migraine may include over-the-counter or prescription medications. It may also include self-help techniques. These include relaxation training and biofeedback.   Q: What is an aura?  A: About 15% of people with migraine get an \"aura\". This is a sign of neurological symptoms that occur before a migraine headache. You may see wavy or jagged lines, dots, or flashing lights. You might experience tunnel vision or blind spots in one or both eyes. The aura can include visual or auditory hallucinations (something imagined). It may include disruptions in smell (such as strange odors), taste or touch. Other symptoms include:  · Numbness.   · A \"pins and needles\" sensation.   · Difficulty in recalling or speaking the correct word.   These neurological events may last as long as 60 minutes. These symptoms will fade as the headache begins.  Q: What is a trigger?  A: Certain physical or environmental factors can lead to or \"trigger\" a migraine. These include:  · Foods.   · Hormonal changes.   · Weather.   · Stress.   It is important to remember that triggers are different for everyone. To help prevent migraine attacks, you need to figure out which triggers affect you. Keep a headache diary. This is a good way to track triggers. The diary will help you talk to your healthcare professional about your condition.  Q: Does weather " "affect migraines?  A: Bright sunshine, hot, humid conditions, and drastic changes in barometric pressure may lead to, or \"trigger,\" a migraine attack in some people. But studies have shown that weather does not act as a trigger for everyone with migraines.  Q: What is the link between migraine and hormones?  A: Hormones start and regulate many of your body's functions. Hormones keep your body in balance within a constantly changing environment. The levels of hormones in your body are unbalanced at times. Examples are during menstruation, pregnancy, or menopause. That can lead to a migraine attack. In fact, about three quarters of all women with migraine report that their attacks are related to the menstrual cycle.   Q: Is there an increased risk of stroke for migraine sufferers?  A: The likelihood of a migraine attack causing a stroke is very remote. That is not to say that migraine sufferers cannot have a stroke associated with their migraines. In persons under age 40, the most common associated factor for stroke is migraine headache. But over the course of a person's normal life span, the occurrence of migraine headache may actually be associated with a reduced risk of dying from cerebrovascular disease due to stroke.   Q: What are acute medications for migraine?  A: Acute medications are used to treat the pain of the headache after it has started. Examples over-the-counter medications, NSAIDs, ergots, and triptans.   Q: What are the triptans?  A: Triptans are the newest class of abortive medications. They are specifically targeted to treat migraine. Triptans are vasoconstrictors. They moderate some chemical reactions in the brain. The triptans work on receptors in your brain. Triptans help to restore the balance of a neurotransmitter called serotonin. Fluctuations in levels of serotonin are thought to be a main cause of migraine.   Q: Are over-the-counter medications for migraine effective?  A: Over-the-counter, " "or \"OTC,\" medications may be effective in relieving mild to moderate pain and associated symptoms of migraine. But you should see your caregiver before beginning any treatment regimen for migraine.   Q: What are preventive medications for migraine?  A: Preventive medications for migraine are sometimes referred to as \"prophylactic\" treatments. They are used to reduce the frequency, severity, and length of migraine attacks. Examples of preventive medications include antiepileptic medications, antidepressants, beta-blockers, calcium channel blockers, and NSAIDs (nonsteroidal anti-inflammatory drugs).  Q: Why are anticonvulsants used to treat migraine?  A: During the past few years, there has been an increased interest in antiepileptic drugs for the prevention of migraine. They are sometimes referred to as \"anticonvulsants\". Both epilepsy and migraine may be caused by similar reactions in the brain.   Q: Why are antidepressants used to treat migraine?  A: Antidepressants are typically used to treat people with depression. They may reduce migraine frequency by regulating chemical levels, such as serotonin, in the brain.   Q: What alternative therapies are used to treat migraine?  A: The term \"alternative therapies\" is often used to describe treatments considered outside the scope of conventional Western medicine. Examples of alternative therapy include acupuncture, acupressure, and yoga. Another common alternative treatment is herbal therapy. Some herbs are believed to relieve headache pain. Always discuss alternative therapies with your caregiver before proceeding. Some herbal products contain arsenic and other toxins.  TENSION HEADACHES  Q: What is a tension-type headache? What causes it? How can I treat it?  A: Tension-type headaches occur randomly. They are often the result of temporary stress, anxiety, fatigue, or anger. Symptoms include soreness in your temples, a tightening band-like sensation around your head (a " "\"vice-like\" ache). Symptoms can also include a pulling feeling, pressure sensations, and lamont head and neck muscles. The headache begins in your forehead, temples, or the back of your head and neck. Treatment for tension-type headache may include over-the-counter or prescription medications. Treatment may also include self-help techniques such as relaxation training and biofeedback.  CLUSTER HEADACHES  Q: What is a cluster headache? What causes it? How can I treat it?  A: Cluster headache gets its name because the attacks come in groups. The pain arrives with little, if any, warning. It is usually on one side of the head. A tearing or bloodshot eye and a runny nose on the same side of the headache may also accompany the pain. Cluster headaches are believed to be caused by chemical reactions in the brain. They have been described as the most severe and intense of any headache type. Treatment for cluster headache includes prescription medication and oxygen.  SINUS HEADACHES  Q: What is a sinus headache? What causes it? How can I treat it?  A: When a cavity in the bones of the face and skull (a sinus) becomes inflamed, the inflammation will cause localized pain. This condition is usually the result of an allergic reaction, a tumor, or an infection. If your headache is caused by a sinus blockage, such as an infection, you will probably have a fever. An x-ray will confirm a sinus blockage. Your caregiver's treatment might include antibiotics for the infection, as well as antihistamines or decongestants.   REBOUND HEADACHES  Q: What is a rebound headache? What causes it? How can I treat it?  A: A pattern of taking acute headache medications too often can lead to a condition known as \"rebound headache.\" A pattern of taking too much headache medication includes taking it more than 2 days per week or in excessive amounts. That means more than the label or a caregiver advises. With rebound headaches, your medications " not only stop relieving pain, they actually begin to cause headaches. Doctors treat rebound headache by tapering the medication that is being overused. Sometimes your caregiver will gradually substitute a different type of treatment or medication. Stopping may be a challenge. Regularly overusing a medication increases the potential for serious side effects. Consult a caregiver if you regularly use headache medications more than 2 days per week or more than the label advises.  ADDITIONAL QUESTIONS AND ANSWERS  Q: What is biofeedback?  A: Biofeedback is a self-help treatment. Biofeedback uses special equipment to monitor your body's involuntary physical responses. Biofeedback monitors:  · Breathing.   · Pulse.   · Heart rate.   · Temperature.   · Muscle tension.   · Brain activity.   Biofeedback helps you refine and perfect your relaxation exercises. You learn to control the physical responses that are related to stress. Once the technique has been mastered, you do not need the equipment any more.  Q: Are headaches hereditary?  A: Four out of five (80%) of people that suffer report a family history of migraine. Scientists are not sure if this is genetic or a family predisposition. Despite the uncertainty, a child has a 50% chance of having migraine if one parent suffers. The child has a 75% chance if both parents suffer.   Q: Can children get headaches?  A: By the time they reach high school, most young people have experienced some type of headache. Many safe and effective approaches or medications can prevent a headache from occurring or stop it after it has begun.   Q: What type of doctor should I see to diagnose and treat my headache?  A: Start with your primary caregiver. Discuss his or her experience and approach to headaches. Discuss methods of classification, diagnosis, and treatment. Your caregiver may decide to recommend you to a headache specialist, depending upon your symptoms or other physical conditions.  Having diabetes, allergies, etc., may require a more comprehensive and inclusive approach to your headache. The National Headache Foundation will provide, upon request, a list of NHF physician members in your state.  Document Released: 03/09/2005 Document Revised: 03/11/2013 Document Reviewed: 08/17/2009  ExitCare® Patient Information ©2013 [employer].          Patient Information     Patient Information    Following emergency treatment: all patient requiring follow-up care must return either to a private physician or a clinic if your condition worsens before you are able to obtain further medical attention, please return to the emergency room.     Billing Information    At [facility], we work to make the billing process streamlined for our patients.  Our Representatives are here to answer any questions you may have regarding your hospital bill.  If you have insurance coverage and have supplied your insurance information to us, we will submit a claim to your insurer on your behalf.  Should you have any questions regarding your bill, we can be reached online or by phone as follows:  Online: You are able pay your bills online or live chat with our representatives about any billing questions you may have. We are here to help Monday - Friday from 8:00am to 7:30pm and 9:00am - 12:00pm on Saturdays.  Please visit [email or web address]  for more information.   Phone:  [phone] or [phone]    Please note that your emergency physician, surgeon, pathologist, radiologist, anesthesiologist, and other specialists are not employed by [facility] and will therefore bill separately for their services.  Please contact them directly for any questions concerning their bills at the numbers below:     Emergency Physician Services:  [phone]  [facility]:  [phone]  [facility]:  [phone]  [facility]:  [phone]    1. Your final  bill may vary from the amount quoted upon discharge if all procedures are not complete at that time, or if your doctor has additional procedures of which we are not aware. You will receive an additional bill if you return to the Emergency Department at [facility] for suture removal regardless of the facility of which the sutures were placed.     2. Please arrange for settlement of this account at the emergency registration.    3. All self-pay accounts are due in full at the time of treatment.  If you are unable to meet this obligation then payment is expected within 4-5 days.     4. If you have had radiology studies (CT, X-ray, Ultrasound, MRI), you have received a preliminary result during your emergency department visit. Please contact the radiology department [phone] to receive a copy of your final result. Please discuss the Final result with your primary physician or with the follow up physician provided.     Crisis Hotline:  [place] Crisis Hotline:  9-749-BNZELZR or 1-289.179.8049  [place] Crisis Hotline:    1-316.267.2904 or 559-492-7125         ED Discharge Follow Up Questions    1. In order to provide you with very good care, we would like to follow up with a phone call in the next few days.  May we have your permission to contact you?     YES /  NO    2. What is the best phone number to call you? (       )_____-__________    3. What is the best time to call you?      Morning  /  Afternoon  /  Evening                   Patient Signature:  ____________________________________________________________    Date:  ____________________________________________________________      Your appointments     Martin 15, 2017  2:00 PM   Follow Up Visit with Aimee Andrade M.D.   [facility] (--)    [address], [address]  [facility] [id number] [phone]           You will be receiving a confirmation call a few days before your appointment from our automated call confirmation system.            Jul  13, 2017 10:20 AM   New Patient with Selinabhumi Reece PA-C   [facility] (--)    [address], [address]  [place] [address] [phone]           Please bring Photo ID, Insurance Cards, All Medication Bottles and copies of any legal documents (such as Living Will, Power of ) If speaking a language besides English please bring an adult . Please arrive 30 minutes prior for check in and registration. You will be receiving a confirmation call a few days before your appointment from our automated call confirmation system.

## 2017-05-06 NOTE — DISCHARGE SUMMARY
DATE OF ADMISSION:  04/29/2017.    DATE OF DISCHARGE:  05/05/2017.    FINAL DIAGNOSES:  1.  Migraine headache.  2.  Suicidal ideation, resolved.  3.  Drug seeking behavior.  4.  Reported history of stroke, no evidence seen on head CTs.  5.  Depression.  6.  Chest pain, no evidence of ischemia.  7.  Occipital nerve stimulator implantation.  8.  History of hydrocephalus.  9.  Lumbar exploration and spinal cord stimulator.  10.  Prior history of C. diff.  11.  Anxiety.  12.  Depression.  13.  Neuropathy.  14.  Rheumatoid arthritis.  15.  Status post  shunt.  16.  Hypokalemia, resolved    HISTORY OF PRESENT ILLNESS:  The patient is a 46-year-old female with chronic   migraine headaches, hydrocephalus with shunt, depression and evidently   rheumatoid arthritis.  She presents with suicidal ideation, chest pain, and   migraine headache.    HOSPITAL COURSE:  Behavior was markedly histrionic throughout the stay.  She   repeatedly requested pain medication, far beyond any evidence of any visible   pain symptoms.  She was lectured on this behavior as maladaptive.  Psychiatry   was consulted.  They cleared the legal hold.  Myocardial perfusion study is   negative for ischemia.  She was noted to have chronic sinusitis, and a mild   deviated septum on a maxillofacial head CT by Dr. Dionisio Soni and this will be   followed in the outpatient setting.  I consulted Dr. Blas Kwan given the   migraine headache complaints.  Initial trial with valproic acid did not   provide relief.  She did evidently gain relief from 3 doses of   dihydroergotamine.    DISCHARGE CONDITION:  Medically stable.    DISCHARGE ACTIVITY:  Absolutely no alcohol, smoking or recreational drug use.    DISCHARGE MEDICATIONS:  1.  She may resume her oxycodone and I will not provide this medication.  2.  She has Toradol IM injections at home per her prior regimen.  I will not   provide these.  2.  Neurontin 600 mg twice daily.  3.  Cymbalta 60 mg twice  daily.  4.  She has Benadryl injections at home.  I will not provide these   medications.  5.  Methotrexate 15 mg every Thursday.  6.  Risperdal 0.5 mg twice daily per psychiatry.  7.  Prednisone 15 mg daily per her prior regimen.  8.  Diamox 500 mg twice daily per her prior regimen.  9.  Iron 324 mg daily.  10.  Folic acid 1 mg daily.  11.  Magnesium supplement.  12.  Calcium supplements.  13.  Sulfasalazine 1000 mg twice daily.  14.  Multivitamin daily.  15.  Prilosec 20 mg daily.  16.  Vitamin D 1000 units daily.    LABORATORY DATA:  Positive for opiates.  Urinalysis is negative.  Electrolytes   and blood counts otherwise within normal limits.  Blood cultures negative.    Beta-hCG is negative.  TSH 1.22.    DIAGNOSTIC STUDIES:  CT angiogram of the chest is negative.  Myocardial   perfusion study is negative.  Maxillofacial CT notes slight deviation of the   nasal septum to the right, mild mucosal thickening in the nasal cavity.  No   sinus opacification seen.    CONSULTS:  1.  Dr. Remedios Puga, psychiatry.  2.  Dr. Blas Kwan from neurology.    PROCEDURES:  None.    FOLLOWUP:  She will follow up with primary care provider in the next 1 week.    She can follow up with Dr. Dionisio Ocampo in the next several weeks.    Discharge time today is 35 minutes.       ____________________________________     MD SHELLI GRAJEDA / EUGENE    DD:  05/05/2017 10:31:51  DT:  05/06/2017 06:27:00    D#:  [id number]  Job#:  [id number]    cc: Remedios Puga MD, DIONISIO OCAMPO MD

## 2017-05-07 ENCOUNTER — PATIENT OUTREACH (OUTPATIENT)
Dept: HEALTH INFORMATION MANAGEMENT | Facility: OTHER | Age: 47
End: 2017-05-07

## 2017-05-07 NOTE — ED PROVIDER NOTES
"ED Provider Note    CHIEF COMPLAINT  Chief Complaint   Patient presents with   • Headache       HPI  Enedelia Domitila Pang is a 46 y.o. female who presents to the emergency room today with complaints of headache. Patient states she has a migraine headache which she has similar history of this. She is recent discharge from the hospital yesterday 5/5. She had been admitted 6 days ago for chest pain with workup negative and continue to have pain issues that would doubt with her family discharged home. Returns again for pain over her head radiating to both sides. States that \"I need a lumbar puncture\". Patient states she has a history of hydrocephalus and had shunt placed which was removed. Sees Dr. Oli Tapia has not seen him since December. She's had several lumbar punctures performed the past with complications requiring blood patch. Denies any difficulty speaking no difficulty with vision or moving arms or legs. Headache is frontal rating to both sides worse with movement or palpation.    REVIEW OF SYSTEMS  See HPI for further details. All other systems are negative.     PAST MEDICAL HISTORY  Past Medical History   Diagnosis Date   • Migraine with aura, with intractable migraine, so stated, without mention of status migrainosus    • H/O Clostridium difficile infection    • Hydrocephalus      with lumbar shunt   • Hx MRSA infection    • Pituitary abnormality (CMS-HCC)    • Allergy, unspecified not elsewhere classified    • Anxiety    • Depression    • Stroke (CMS-[facility])      2007   • autoimmune        FAMILY HISTORY  [unfilled]    SOCIAL HISTORY  Social History     Social History   • Marital Status:      Spouse Name: N/A   • Number of Children: N/A   • Years of Education: N/A     Social History Main Topics   • Smoking status: Never Smoker    • Smokeless tobacco: Never Used   • Alcohol Use: No   • Drug Use: No   • Sexual Activity: Not on file     Other Topics Concern   • Not on file     Social History Narrative " "      SURGICAL HISTORY  Past Surgical History   Procedure Laterality Date   • Cholecystectomy     • Tonsillectomy     • Appendectomy     • Tubal ligation     • Other       right knee surgery   • Other neurological surg       occipital nerve stimulator   • Irrigation & debridement neuro N/A 6/28/2015     Procedure: IRRIGATION & DEBRIDEMENT NEURO;  Surgeon: Oli Oh III, M.D.;  Location: SURGERY [facility], [place];  Service:    • Lumbar exploration N/A 8/31/2015     Procedure: LUMBAR EXPLORATION- Lumbar shunt removal ;  Surgeon: Oli Oh III, M.D.;  Location: SURGERY [facility], [place];  Service:    • Spinal cord stimulator  12/19/2016     Procedure: SPINAL CORD STIMULATOR FOR: PLACEMENT OF LEFT FLANK OCCIPITAL NERVE STIMULATOR BATTERY PACK;  Surgeon: Oli Oh III, M.D.;  Location: SURGERY [facility], [place];  Service:        CURRENT MEDICATIONS  Home Medications     **Home medications have not yet been reviewed for this encounter**          ALLERGIES  Allergies   Allergen Reactions   • Prochlorperazine Swelling     Tongue swelling. Reaction as a teen. (compazine)  Tolerated Phenergan on 02/24/15   • Sumatriptan Unspecified     Historical=\"Heart stops.\" Reaction  between 1995 to 1997   • Vancomycin Shortness of Breath and Rash     Reaction in 2005.  D/W patient 8/31/15 - tolerated loading dose of vancomycin administered on 8/30/15 with some itching to chest with decreased infusion rate. Red Man Syndrome       PHYSICAL EXAM  VITAL SIGNS: /89 mmHg  Pulse 89  Temp(Src) 36.4 °C (97.5 °F) (Temporal)  Resp 15  Ht 1.6 m (5' 2.99\")  Wt 71 kg (156 lb 8.4 oz)  BMI 27.73 kg/m2  SpO2 99%      Constitutional: Well developed, Well nourished, No acute distress, Non-toxic appearance.   HENT: Normocephalic, Atraumatic, Bilateral external ears normal, Oropharynx moist, No oral exudates, Nose normal.   Eyes: PERRLA, EOMI, Conjunctiva normal, No discharge.   Neck: Normal range of motion, No tenderness, Supple, No " stridor. No meningeal signs noted  Lymphatic: No lymphadenopathy noted.   Cardiovascular: Normal heart rate, Normal rhythm, No murmurs, No rubs, No gallops.   Thorax & Lungs: Normal breath sounds, No respiratory distress, No wheezing, No chest tenderness.   Abdomen: Bowel sounds normal, Soft, No tenderness, No masses, No pulsatile masses.   Skin: Warm, Dry, No erythema, No rash.   Back: No tenderness, No CVA tenderness.    Extremities: Intact distal pulses, No edema, No tenderness, No cyanosis, No clubbing.   Musculoskeletal: Good range of motion in all major joints. No tenderness to palpation or major deformities noted.   Neurologic: Alert & oriented x 3, Normal motor function, Normal sensory function, No focal deficits noted.   Psychiatric: Affect normal, Judgment poor, Mood angry, anxious. No suicidal homicidal ideation.      RADIOLOGY/PROCEDURES  Patient declined imaging studies    COURSE & MEDICAL DECISION MAKING  Pertinent Labs & Imaging studies reviewed. (See chart for details)  Discussed with patient and her  in depth that there is no neurological deficits at this time or signs of subarachnoid hemorrhage or bleeding. I did discuss patient's care and symptoms with the neurosurgeon on call for Dr. Oh. We both felt that repeat CT scan show if there is hydrocephalus and then a lumbar puncture could be performed if deemed necessary. Patient refused imaging studies. It was also discussed with neurosurgeon the patient could follow up in their office on Monday. She has a history of chronic pain and I discussed with her helping her out with referral to chronic pain management and asked ER  to help with this. Patient was told that we would not be giving her any narcotic pain medications at this time. Both patient and her  were upset. I did discuss with her that she's had frequent complications or lumbar puncture and to perform this with her chronic headaches without adequate indication would  only set her up for further complications. No meningeal signs or other neurological deficits noted on exam.    FINAL IMPRESSION  1. Headache  2. Chronic pain   3. Anxiety/depression         Electronically signed by: Benoit Dickens, 5/6/2017 5:02 PM

## 2017-05-07 NOTE — PROGRESS NOTES
· Placed discharge outreach phone call to patient s/p hospital discharge 5/5/17 and ER discharge 5/6/17.  Left voicemail with my contact information and instructions to return my phone call.    · 5/7/17 at 3:24 PM--Received VM from pt at 3:19 PM.  Placed phone call to patient, discharge outreach completed.

## 2017-05-12 NOTE — PSYCHIATRY
PSYCHIATRIC FOLLOW UP:    Reason for Admission: suicidal ideation   Psychiatric Supervising Attending:         HPI:     45 y/o woman with chronic migraines, stroke hydrocephalus, and depression. She reports she is no longer suicidal. Discussed with her at length what she would do if pain and suicidal thoughts return, and she reports she would just come to the ED again. She is frustrated providers think she is drug seeking. She is looking psychiatrically considerably better. Mood lability is significantly improved on risperdal. Discussed with her importance of continuing this medication, and importance of follow for outpatient workup.       Psychiatric Examination: observed phenomenon:  Vitals:  Filed Vitals:    05/04/17 2027 05/05/17 0000 05/05/17 0400 05/05/17 0730   BP:  127/69 96/52 101/66   Pulse: 100  102 104   Temp:   36.4 °C (97.5 °F) 37.1 °C (98.8 °F)   Resp:   17 19   Height:       Weight:       SpO2:   95% 93%       Musculoskeletal(abnormal movements, gait, etc): mild psychomotor retardation   Appearance:wnl   Thoughts: logical and sequential   Speech: nl tone, rate,volume  Mood:    euthymic  Affect:    constricted  SI/HI:   None   Attention/Alertness:    Awake, alert   Memory:    wnl   Orientation:    orineted  Fund of Knowledge:      Insight/Judgement into psychiatric symptoms improved but still diminished   Neurological Testing:( ie clock, cube drawing, MMSE, MOCA,etc.)    Medical systems reviewed: (pain, new complaint, etc)     Lab results/tests:        5/4/2017 5:25 PM    HISTORY/REASON FOR EXAM:  Chronic Sinusitis      TECHNIQUE/EXAM DESCRIPTION AND NUMBER OF VIEWS:  CT scan maxillofacial without contrast, with reconstructions.    Thin-section helical imaging was obtained of the face from the orbital roofs through the mandible. Coronal multiplanar volume reformat images were generated from the axial data.    Low dose optimization technique was utilized for this CT exam including automated exposure  control and adjustment of the mA and/or kV according to patient size.    COMPARISON:  None    FINDINGS:      There is some deviation of the upper nasal septum to the right side. Mild mucosal thickening is noted in the nasal cavity which causes some narrowing of the nasal airway. No air-fluid level or opacification or significant mucosal thickening is identified   in the frontal ethmoid sphenoid or maxillary sinuses. No bone erosions are identified. Maxillary infundibula appear to be patent. No air within the orbits is identified.         Impression          1.  Some nasal septal deviation to the right side.    2.  Mild mucosal thickening in the nasal cavity which causes some narrowing of the nasal airway.    3.  No sinus opacifications are identified.       Assessment:(acuity level)  # Mood disorder, unspecified  - Risperidone 0.5 mg PO BID for mood.  # Cognitive disorder, unspecified  Plan:  legal hold:discontinued   continue risperdal   Signing off

## 2017-05-20 ENCOUNTER — APPOINTMENT (OUTPATIENT)
Dept: RADIOLOGY | Facility: MEDICAL CENTER | Age: 47
DRG: 032 | End: 2017-05-20
Attending: EMERGENCY MEDICINE
Payer: MEDICARE

## 2017-05-20 ENCOUNTER — HOSPITAL ENCOUNTER (INPATIENT)
Facility: MEDICAL CENTER | Age: 47
LOS: 13 days | DRG: 032 | End: 2017-06-03
Attending: EMERGENCY MEDICINE | Admitting: HOSPITALIST
Payer: MEDICARE

## 2017-05-20 DIAGNOSIS — R41.82 ALTERED MENTAL STATUS, UNSPECIFIED ALTERED MENTAL STATUS TYPE: ICD-10-CM

## 2017-05-20 LAB
ALBUMIN SERPL BCP-MCNC: 4.7 G/DL (ref 3.2–4.9)
ALBUMIN/GLOB SERPL: 1.6 G/DL
ALP SERPL-CCNC: 206 U/L (ref 30–99)
ALT SERPL-CCNC: 775 U/L (ref 2–50)
AMPHET UR QL SCN: NEGATIVE
ANION GAP SERPL CALC-SCNC: 12 MMOL/L (ref 0–11.9)
APPEARANCE UR: CLEAR
AST SERPL-CCNC: 676 U/L (ref 12–45)
BARBITURATES UR QL SCN: NEGATIVE
BASOPHILS # BLD AUTO: 0.2 % (ref 0–1.8)
BASOPHILS # BLD: 0.03 K/UL (ref 0–0.12)
BENZODIAZ UR QL SCN: NEGATIVE
BILIRUB SERPL-MCNC: 0.5 MG/DL (ref 0.1–1.5)
BILIRUB UR QL STRIP.AUTO: NEGATIVE
BUN SERPL-MCNC: 17 MG/DL (ref 8–22)
BZE UR QL SCN: NEGATIVE
CALCIUM SERPL-MCNC: 9.6 MG/DL (ref 8.5–10.5)
CANNABINOIDS UR QL SCN: NEGATIVE
CHLORIDE SERPL-SCNC: 104 MMOL/L (ref 96–112)
CO2 SERPL-SCNC: 19 MMOL/L (ref 20–33)
COLOR UR: NORMAL
CREAT SERPL-MCNC: 0.75 MG/DL (ref 0.5–1.4)
EKG IMPRESSION: NORMAL
EOSINOPHIL # BLD AUTO: 0 K/UL (ref 0–0.51)
EOSINOPHIL NFR BLD: 0 % (ref 0–6.9)
ERYTHROCYTE [DISTWIDTH] IN BLOOD BY AUTOMATED COUNT: 45.9 FL (ref 35.9–50)
GFR SERPL CREATININE-BSD FRML MDRD: >60 ML/MIN/1.73 M 2
GLOBULIN SER CALC-MCNC: 3 G/DL (ref 1.9–3.5)
GLUCOSE SERPL-MCNC: 86 MG/DL (ref 65–99)
GLUCOSE UR STRIP.AUTO-MCNC: NEGATIVE MG/DL
HCT VFR BLD AUTO: 43.6 % (ref 37–47)
HGB BLD-MCNC: 14.9 G/DL (ref 12–16)
IMM GRANULOCYTES # BLD AUTO: 0.05 K/UL (ref 0–0.11)
IMM GRANULOCYTES NFR BLD AUTO: 0.4 % (ref 0–0.9)
KETONES UR STRIP.AUTO-MCNC: NEGATIVE MG/DL
LACTATE BLD-SCNC: 1.7 MMOL/L (ref 0.5–2)
LEUKOCYTE ESTERASE UR QL STRIP.AUTO: NEGATIVE
LYMPHOCYTES # BLD AUTO: 0.81 K/UL (ref 1–4.8)
LYMPHOCYTES NFR BLD: 5.8 % (ref 22–41)
MCH RBC QN AUTO: 32.6 PG (ref 27–33)
MCHC RBC AUTO-ENTMCNC: 34.2 G/DL (ref 33.6–35)
MCV RBC AUTO: 95.4 FL (ref 81.4–97.8)
MDMA UR QL SCN: NEGATIVE
METHADONE UR QL SCN: NEGATIVE
MICRO URNS: NORMAL
MONOCYTES # BLD AUTO: 0.73 K/UL (ref 0–0.85)
MONOCYTES NFR BLD AUTO: 5.2 % (ref 0–13.4)
NEUTROPHILS # BLD AUTO: 12.42 K/UL (ref 2–7.15)
NEUTROPHILS NFR BLD: 88.4 % (ref 44–72)
NITRITE UR QL STRIP.AUTO: NEGATIVE
NRBC # BLD AUTO: 0 K/UL
NRBC BLD AUTO-RTO: 0 /100 WBC
OPIATES UR QL SCN: NEGATIVE
OXYCODONE UR QL SCN: POSITIVE
PCP UR QL SCN: NEGATIVE
PH UR STRIP.AUTO: 8 [PH]
PLATELET # BLD AUTO: 330 K/UL (ref 164–446)
PMV BLD AUTO: 9.3 FL (ref 9–12.9)
POTASSIUM SERPL-SCNC: 3 MMOL/L (ref 3.6–5.5)
PROPOXYPH UR QL SCN: NEGATIVE
PROT SERPL-MCNC: 7.7 G/DL (ref 6–8.2)
PROT UR QL STRIP: NEGATIVE MG/DL
RBC # BLD AUTO: 4.57 M/UL (ref 4.2–5.4)
RBC UR QL AUTO: NEGATIVE
SODIUM SERPL-SCNC: 135 MMOL/L (ref 135–145)
SP GR UR STRIP.AUTO: 1
WBC # BLD AUTO: 14 K/UL (ref 4.8–10.8)

## 2017-05-20 PROCEDURE — 93005 ELECTROCARDIOGRAM TRACING: CPT

## 2017-05-20 PROCEDURE — 36415 COLL VENOUS BLD VENIPUNCTURE: CPT

## 2017-05-20 PROCEDURE — 96375 TX/PRO/DX INJ NEW DRUG ADDON: CPT

## 2017-05-20 PROCEDURE — 99285 EMERGENCY DEPT VISIT HI MDM: CPT

## 2017-05-20 PROCEDURE — 80053 COMPREHEN METABOLIC PANEL: CPT

## 2017-05-20 PROCEDURE — 80307 DRUG TEST PRSMV CHEM ANLYZR: CPT

## 2017-05-20 PROCEDURE — 85025 COMPLETE CBC W/AUTO DIFF WBC: CPT

## 2017-05-20 PROCEDURE — 71010 DX-CHEST-PORTABLE (1 VIEW): CPT

## 2017-05-20 PROCEDURE — 96374 THER/PROPH/DIAG INJ IV PUSH: CPT

## 2017-05-20 PROCEDURE — 700111 HCHG RX REV CODE 636 W/ 250 OVERRIDE (IP): Performed by: EMERGENCY MEDICINE

## 2017-05-20 PROCEDURE — 87040 BLOOD CULTURE FOR BACTERIA: CPT

## 2017-05-20 PROCEDURE — 83605 ASSAY OF LACTIC ACID: CPT

## 2017-05-20 PROCEDURE — 87086 URINE CULTURE/COLONY COUNT: CPT

## 2017-05-20 PROCEDURE — 87077 CULTURE AEROBIC IDENTIFY: CPT

## 2017-05-20 PROCEDURE — 70450 CT HEAD/BRAIN W/O DYE: CPT

## 2017-05-20 PROCEDURE — 81003 URINALYSIS AUTO W/O SCOPE: CPT

## 2017-05-20 PROCEDURE — 700105 HCHG RX REV CODE 258: Performed by: EMERGENCY MEDICINE

## 2017-05-20 RX ORDER — MAGNESIUM OXIDE 400 MG/1
250 TABLET ORAL DAILY
COMMUNITY
End: 2017-10-05

## 2017-05-20 RX ORDER — ASCORBIC ACID 1000 MG
500 TABLET ORAL
COMMUNITY
End: 2017-10-14

## 2017-05-20 RX ORDER — SODIUM CHLORIDE 9 MG/ML
30 INJECTION, SOLUTION INTRAVENOUS ONCE
Status: COMPLETED | OUTPATIENT
Start: 2017-05-20 | End: 2017-05-20

## 2017-05-20 RX ORDER — ONDANSETRON 2 MG/ML
4 INJECTION INTRAMUSCULAR; INTRAVENOUS ONCE
Status: COMPLETED | OUTPATIENT
Start: 2017-05-20 | End: 2017-05-20

## 2017-05-20 RX ADMIN — ONDANSETRON 4 MG: 2 INJECTION INTRAMUSCULAR; INTRAVENOUS at 22:36

## 2017-05-20 RX ADMIN — HYDROMORPHONE HYDROCHLORIDE 1 MG: 1 INJECTION, SOLUTION INTRAMUSCULAR; INTRAVENOUS; SUBCUTANEOUS at 22:39

## 2017-05-20 RX ADMIN — SODIUM CHLORIDE 2040 ML: 9 INJECTION, SOLUTION INTRAVENOUS at 22:32

## 2017-05-20 ASSESSMENT — PAIN SCALES - GENERAL
PAINLEVEL_OUTOF10: 9
PAINLEVEL_OUTOF10: 8
PAINLEVEL_OUTOF10: 9

## 2017-05-20 ASSESSMENT — LIFESTYLE VARIABLES: DO YOU DRINK ALCOHOL: NO

## 2017-05-20 NOTE — IP AVS SNAPSHOT
" <p align=\"LEFT\"><IMG SRC=\"//[email or web address]\" alt=\"Image\" WIDTH=\"50%\" HEIGHT=\"200\" BORDER=\"\"></p>                   Name:Enedelia Pang  Medical Record Number:[id number]  CSN: [id number]    YOB: 1970   Age: 46 y.o.  Sex: female  HT:1.6 m (5' 3\") WT: 75.1 kg (165 lb 9.1 oz)          Admit Date: 5/20/2017     Discharge Date:   Today's Date: 6/3/2017  Attending Doctor:  Drew Martinez M.D.                  Allergies:  Prochlorperazine; Sumatriptan; and Vancomycin          Your appointments     Martin 15, 2017  2:00 PM   Follow Up Visit with Aimee Andrade M.D.   [facility] (--)    [address] [[email or web address]  [facility] [id number] [phone]           You will be receiving a confirmation call a few days before your appointment from our automated call confirmation system.            Jul 13, 2017 10:20 AM   New Patient with Selina Reece PA-C   [facility] (--)    [address], [address]  [facility] [id number] [phone]           Please bring Photo ID, Insurance Cards, All Medication Bottles and copies of any legal documents (such as Living Will, Power of ) If speaking a language besides English please bring an adult . Please arrive 30 minutes prior for check in and registration. You will be receiving a confirmation call a few days before your appointment from our automated call confirmation system.              Follow-up Information     1. Follow up with Oli Oh III, M.D..    Specialty:  Neurosurgery    Why:   has left a message with the office to contact patient.    Contact information    [address]  [facility] [id number] [phone]          2. Follow up with Elliott Power M.D..    Specialty:  Family Medicine    Why:   has left a message with the office to contact patient and schedule an appointment.    Contact information    [address] [place] [address]  [phone]          3. Follow up with Drew" JI Berry M.D..    Specialty:  Neurosurgery    Why:   has left message with office to contact patient and schedule an appointment.    Contact information    [address]  [place] [id number] [phone]           Medication List      Take these Medications        Instructions    acetaZOLAMIDE  MG Cp12   Commonly known as:  DIAMOX    Take 500 mg by mouth 2 times a day.   Dose:  500 mg       calcium carbonate 500 MG Tabs   Commonly known as:  OS-ZAIN 500    Take 1 Tab by mouth 2 times a day, with meals.   Dose:  500 mg       Cholecalciferol 1000 UNIT Tabs   Commonly known as:  VITAMIND D3    Take 1 Tab by mouth every day.   Dose:  1000 Units       duloxetine 60 MG Cpep delayed-release capsule   Commonly known as:  CYMBALTA    Take 60 mg by mouth 2 times a day.   Dose:  60 mg       ferrous gluconate 324 (38 FE) MG Tabs   Commonly known as:  FERGON    Take 1 Tab by mouth 2 times a day, with meals.   Dose:  324 mg       folic acid 1 MG Tabs   Commonly known as:  FOLVITE    Take 1 Tab by mouth every day.   Dose:  1 mg       gabapentin 600 MG tablet   Commonly known as:  NEURONTIN    Take 600 mg by mouth 3 times a day.   Dose:  600 mg       magnesium oxide 400 MG Tabs   Commonly known as:  MAG-OX    Take 400 mg by mouth every day.   Dose:  400 mg       MULTIVITAMIN ADULT PO    Take 1 Tab by mouth every day.   Dose:  1 Tab       omeprazole 20 MG delayed-release capsule   Commonly known as:  PRILOSEC    Take 1 Cap by mouth every day.   Dose:  20 mg       oxycodone immediate release 10 MG immediate release tablet   Commonly known as:  ROXICODONE    Take 1 Tab by mouth every four hours as needed for Moderate Pain.   Dose:  10 mg       predniSONE 5 MG Tabs   Commonly known as:  DELTASONE    Take 2 Tabs by mouth every day.   Dose:  10 mg       risperidone 0.5 MG Tabs   Commonly known as:  RISPERDAL    Take 1 Tab by mouth 2 Times a Day.   Dose:  0.5 mg       sulfaSALAzine 500 MG Tabs   Commonly known as:   AZULFIDINE    Take 2 Tabs by mouth 2 times a day.   Dose:  1000 mg       Valerian Root 500 MG Caps    Take 500 mg by mouth every bedtime.   Dose:  500 mg

## 2017-05-20 NOTE — IP AVS SNAPSHOT
" Home Care Instructions                                                                                                                  Name:Enedelia Pang  Medical Record Number:[id number]  CSN: [id number]    YOB: 1970   Age: 46 y.o.  Sex: female  HT:1.6 m (5' 3\") WT: 75.1 kg (165 lb 9.1 oz)          Admit Date: 5/20/2017     Discharge Date:   Today's Date: 6/3/2017  Attending Doctor:  Drew Martinez M.D.                  Allergies:  Prochlorperazine; Sumatriptan; and Vancomycin            Discharge Instructions       Discharge Instructions    Discharged to home by car with relative. Discharged via wheelchair, hospital escort: Yes.  Special equipment needed: Not Applicable    Be sure to schedule a follow-up appointment with your primary care doctor or any specialists as instructed.     Discharge Plan:   Diet Plan: Discussed  Activity Level: Discussed  Confirmed Follow up Appointment: Appointment Scheduled  Confirmed Symptoms Management: Discussed  Medication Reconciliation Updated: Yes  Influenza Vaccine Indication: Patient Refuses    I understand that a diet low in cholesterol, fat, and sodium is recommended for good health. Unless I have been given specific instructions below for another diet, I accept this instruction as my diet prescription.   Other diet: Regular    Special Instructions: Followup with me as outpatient to discuss occipital nerve stimulator removal, if she still desires that procedure.    · Is patient discharged on Warfarin / Coumadin?   No     · Is patient Post Blood Transfusion?  No      Ventriculoperitoneal Shunt Placement, Care After  Refer to this sheet in the next few weeks. These instructions provide you with information about caring for yourself after your procedure. Your health care provider may also give you more specific instructions. Your treatment has been planned according to current medical practices, but problems sometimes occur. Call your health care provider if you " have any problems or questions after your procedure.  WHAT TO EXPECT AFTER THE PROCEDURE  After your procedure, it is typical to have the following:  · Swelling in the ventriculoperitoneal () shunt placement area.  · Soreness near your scalp or abdominal incision.  · Soreness in your neck and chest on the side of your  shunt.  HOME CARE INSTRUCTIONS  · Take medicines only as directed by your health care provider.  · Do not take baths, swim, or use a hot tub until your health care provider approves.  · Rest often. Your body needs time to adjust to the  shunt.  · If you have a programmable shunt and need an MRI, it is very important to see your surgeon to have your shunt reprogrammed before the procedure. Many programmable shunts are sensitive to magnets, which are involved in MRIs.  · There are many different ways to close and cover an incision, including stitches (sutures), skin glue, and adhesive strips. Follow your health care provider's instructions on:  · Incision care.  · Bandage (dressing) changes and removal.  · Incision closure removal.  · Keep all follow-up visits as directed by your health care provider. This is important.  SEEK MEDICAL CARE IF:  · You have a poor appetite.  · You have low energy.  · You feel restless, confused, or irritable.  SEEK IMMEDIATE MEDICAL CARE IF:  · You have drainage from an incision site.  · An incision site starts to get red, warm, swollen, or tender.    · An incision site opens up.  · You have any signs or symptoms of shunt malfunction. These include:  · Headaches.  · Nausea and vomiting.  · Fever.  · Swelling along the  shunt path.  · Vision changes.  · You cannot control your bladder.  · You have a seizure.  · You have trouble walking.     This information is not intended to replace advice given to you by your health care provider. Make sure you discuss any questions you have with your health care provider.     Document Released: 05/15/2012 Document Revised:  01/08/2016 Document Reviewed: 05/27/2015  [employer] Interactive Patient Education ©2016 [employer].    Incision Care  An incision is when a surgeon cuts into your body. After surgery, the incision needs to be cared for properly to prevent infection.   HOW TO CARE FOR YOUR INCISION  · Take medicines only as directed by your health care provider.  · There are many different ways to close and cover an incision, including stitches, skin glue, and adhesive strips. Follow your health care provider's instructions on:  ¨ Incision care.  ¨ Bandage (dressing) changes and removal.  ¨ Incision closure removal.  · Do not take baths, swim, or use a hot tub until your health care provider approves. You may shower as directed by your health care provider.  · Resume your normal diet and activities as directed.  · Use anti-itch medicine (such as an antihistamine) as directed by your health care provider. The incision may itch while it is healing. Do not pick or scratch at the incision.  · Drink enough fluid to keep your urine clear or pale yellow.  SEEK MEDICAL CARE IF:   · You have drainage, redness, swelling, or pain at your incision site.  · You have muscle aches, chills, or a general ill feeling.  · You notice a bad smell coming from the incision or dressing.  · Your incision edges separate after the sutures, staples, or skin adhesive strips have been removed.  · You have persistent nausea or vomiting.  · You have a fever.  · You are dizzy.  SEEK IMMEDIATE MEDICAL CARE IF:   · You have a rash.  · You faint.  · You have difficulty breathing.  MAKE SURE YOU:   · Understand these instructions.  · Will watch your condition.  · Will get help right away if you are not doing well or get worse.     This information is not intended to replace advice given to you by your health care provider. Make sure you discuss any questions you have with your health care provider.     Document Released: 07/07/2006 Document Revised: 01/08/2016 Document  Reviewed: 02/11/2015  [employer] Interactive Patient Education ©2016 [employer]        Depression / Suicide Risk    As you are discharged from this [facility] facility, it is important to learn how to keep safe from harming yourself.    Recognize the warning signs:  · Abrupt changes in personality, positive or negative- including increase in energy   · Giving away possessions  · Change in eating patterns- significant weight changes-  positive or negative  · Change in sleeping patterns- unable to sleep or sleeping all the time   · Unwillingness or inability to communicate  · Depression  · Unusual sadness, discouragement and loneliness  · Talk of wanting to die  · Neglect of personal appearance   · Rebelliousness- reckless behavior  · Withdrawal from people/activities they love  · Confusion- inability to concentrate     If you or a loved one observes any of these behaviors or has concerns about self-harm, here's what you can do:  · Talk about it- your feelings and reasons for harming yourself  · Remove any means that you might use to hurt yourself (examples: pills, rope, extension cords, firearm)  · Get professional help from the community (Mental Health, Substance Abuse, psychological counseling)  · Do not be alone:Call your Safe Contact- someone whom you trust who will be there for you.  · Call your local CRISIS HOTLINE 432-4919 or 297-143-2991  · Call your local Children's Mobile Crisis Response Team [place] (988) 912-3224 or www.Wipster  · Call the toll free National Suicide Prevention Hotlines   · National Suicide Prevention Lifeline 653-391-FWQW (3686)  · National Hope Line Network 800-SUICIDE (844-0671)        Your appointments     Martin 15, 2017  2:00 PM   Follow Up Visit with Aimee Andrade M.D.   [facility] (--)    [address], [address]  [place] [address] [phone]           You will be receiving a confirmation call a few days before your appointment from our  automated call confirmation system.            Jul 13, 2017 10:20 AM   New Patient with Selina Reece PA-C   [facility] (--)    [address], [address]  [place] [id number] [phone]           Please bring Photo ID, Insurance Cards, All Medication Bottles and copies of any legal documents (such as Living Will, Power of ) If speaking a language besides English please bring an adult . Please arrive 30 minutes prior for check in and registration. You will be receiving a confirmation call a few days before your appointment from our automated call confirmation system.              Follow-up Information     1. Follow up with Oli Oh III, M.D..    Specialty:  Neurosurgery    Why:   has left a message with the office to contact patient.    Contact information    [address]  [place] [address]  [phone]          2. Follow up with Elliott Power M.D..    Specialty:  Family Medicine    Why:   has left a message with the office to contact patient and schedule an appointment.    [facility] information    [address] [facility] [facility] [address]  [phone]          3. Follow up with Drew Berry M.D..    Specialty:  Neurosurgery    Why:   has left message with office to contact patient and schedule an appointment.    Contact information    [address]  [place] [address]  [phone]           Discharge Medication Instructions:    Below are the medications your physician expects you to take upon discharge:    Review all your home medications and newly ordered medications with your doctor and/or pharmacist. Follow medication instructions as directed by your doctor and/or pharmacist.    Please keep your medication list with you and share with your physician.               Medication List      CONTINUE taking these medications        Instructions    Morning Afternoon Evening Bedtime    acetaZOLAMIDE  MG Cp12   Last time this was  given:  500 mg on 5/30/2017  8:18 PM   Commonly known as:  DIAMOX        Take 500 mg by mouth 2 times a day.   Dose:  500 mg                        calcium carbonate 500 MG Tabs   Last time this was given:  500 mg on 6/3/2017  9:41 AM   Commonly known as:  OS-ZAIN 500        Take 1 Tab by mouth 2 times a day, with meals.   Dose:  500 mg                        Cholecalciferol 1000 UNIT Tabs   Commonly known as:  VITAMIND D3        Take 1 Tab by mouth every day.   Dose:  1000 Units                        duloxetine 60 MG Cpep delayed-release capsule   Last time this was given:  60 mg on 6/3/2017  9:41 AM   Commonly known as:  CYMBALTA        Take 60 mg by mouth 2 times a day.   Dose:  60 mg                        ferrous gluconate 324 (38 FE) MG Tabs   Last time this was given:  324 mg on 6/3/2017  9:41 AM   Commonly known as:  FERGON        Take 1 Tab by mouth 2 times a day, with meals.   Dose:  324 mg                        folic acid 1 MG Tabs   Last time this was given:  1 mg on 6/3/2017  9:41 AM   Commonly known as:  FOLVITE        Take 1 Tab by mouth every day.   Dose:  1 mg                        gabapentin 600 MG tablet   Commonly known as:  NEURONTIN        Take 600 mg by mouth 3 times a day.   Dose:  600 mg                        magnesium oxide 400 MG Tabs   Commonly known as:  MAG-OX        Take 400 mg by mouth every day.   Dose:  400 mg                        MULTIVITAMIN ADULT PO        Take 1 Tab by mouth every day.   Dose:  1 Tab                        omeprazole 20 MG delayed-release capsule   Commonly known as:  PRILOSEC        Take 1 Cap by mouth every day.   Dose:  20 mg                        oxycodone immediate release 10 MG immediate release tablet   Last time this was given:  10 mg on 6/2/2017 10:45 PM   Commonly known as:  ROXICODONE        Take 1 Tab by mouth every four hours as needed for Moderate Pain.   Dose:  10 mg                        predniSONE 5 MG Tabs   Last time this was given:  10  mg on 6/3/2017  9:42 AM   Commonly known as:  DELTASONE        Take 2 Tabs by mouth every day.   Dose:  10 mg                        risperidone 0.5 MG Tabs   Last time this was given:  1 mg on 6/3/2017  9:42 AM   Commonly known as:  RISPERDAL        Take 1 Tab by mouth 2 Times a Day.   Dose:  0.5 mg                        sulfaSALAzine 500 MG Tabs   Last time this was given:  1,000 mg on 6/3/2017  9:42 AM   Commonly known as:  AZULFIDINE        Take 2 Tabs by mouth 2 times a day.   Dose:  1000 mg                        Valerian Root 500 MG Caps        Take 500 mg by mouth every bedtime.   Dose:  500 mg                          STOP taking these medications     methotrexate 2.5 MG Tabs                       Instructions           Diet / Nutrition:    Follow any diet instructions given to you by your doctor or the dietician, including how much salt (sodium) you are allowed each day.    If you are overweight, talk to your doctor about a weight reduction plan.    Activity:    Remain physically active following your doctor's instructions about exercise and activity.    Rest often.     Any time you become even a little tired or short of breath, SIT DOWN and rest.    Worsening Symptoms:    Report any of the following signs and symptoms to the doctor's office immediately:    *Pain of jaw, arm, or neck  *Chest pain not relieved by medication                               *Dizziness or loss of consciousness  *Difficulty breathing even when at rest   *More tired than usual                                       *Bleeding drainage or swelling of surgical site  *Swelling of feet, ankles, legs or stomach                 *Fever (>100ºF)  *Pink or blood tinged sputum  *Weight gain (3lbs/day or 5lbs /week)           *Shock from internal defibrillator (if applicable)  *Palpitations or irregular heartbeats                *Cool and/or numb extremities    Stroke Awareness    Common Risk Factors for Stroke include:    Age  Atrial  Fibrillation  Carotid Artery Stenosis  Diabetes Mellitus  Excessive alcohol consumption  High blood pressure  Overweight   Physical inactivity  Smoking    Warning signs and symptoms of a stroke include:    *Sudden numbness or weakness of the face, arm or leg (especially on one side of the body).  *Sudden confusion, trouble speaking or understanding.  *Sudden trouble seeing in one or both eyes.  *Sudden trouble walking, dizziness, loss of balance or coordination.Sudden severe headache with no known cause.    It is very important to get treatment quickly when a stroke occurs. If you experience any of the above warning signs, call 911 immediately.                   Disclaimer         Quit Smoking / Tobacco Use:    I understand the use of any tobacco products increases my chance of suffering from future heart disease or stroke and could cause other illnesses which may shorten my life. Quitting the use of tobacco products is the single most important thing I can do to improve my health. For further information on smoking / tobacco cessation call a Toll Free Quit Line at 1-942.148.9444 (*National [employer] [place]) or 1-502.163.4295 (American Lung Association) or you can access the web based program at www.lungusa.org.    [place] Tobacco Users Help Line:  (327) 998-8212       Toll Free: 1-977.255.1043  Quit Tobacco Program [facility] (010)428-7834    Crisis Hotline:    [place] Crisis Hotline:  7-504-UYTDNJZ or 1-579.135.1630    [place] Crisis Hotline:    1-130.288.6748 or 263-652-6533    Discharge Survey:   Thank you for choosing [facility]. We hope we did everything we could to make your hospital stay a pleasant one. You may be receiving a phone survey and we would appreciate your time and participation in answering the questions. Your input is very valuable to us in our efforts to improve our service to our patients and their families.        My signature on this form indicates that:    1. I have  reviewed and understand the above information.  2. My questions regarding this information have been answered to my satisfaction.  3. I have formulated a plan with my discharge nurse to obtain my prescribed medications for home.                  Disclaimer         __________________________________                     __________       ________                       Patient Signature                                                 Date                    Time

## 2017-05-20 NOTE — IP AVS SNAPSHOT
6/3/2017    Enedelia Pang  [address] [place] [address]    Dear Enedelia:    [facility] wants to ensure your discharge home is safe and you or your loved ones have had all of your questions answered regarding your care after you leave the hospital.    Below is a list of resources and contact information should you have any questions regarding your hospital stay, follow-up instructions, or active medical symptoms.    Questions or Concerns Regarding… Contact   Medical Questions Related to Your Discharge  (7 days a week, 8am-5pm) Contact a Nurse Care Coordinator   [phone]   Medical Questions Not Related to Your Discharge  (24 hours a day / 7 days a week)  Contact the Nurse Health Line   [phone]    Medications or Discharge Instructions Refer to your discharge packet   or contact your [facility] Primary Care Provider   [phone]   Follow-up Appointment(s) Schedule your appointment via Feedback   or contact Scheduling [phone]   Billing Review your statement via Feedback  or contact Billing [phone]   Medical Records Review your records via Feedback   or contact Medical Records [phone]     You may receive a telephone call within two days of discharge. This call is to make certain you understand your discharge instructions and have the opportunity to have any questions answered. You can also easily access your medical information, test results and upcoming appointments via the Feedback free online health management tool. You can learn more and sign up at [email or web address]/[email or web address]. For assistance setting up your Feedback account, please call [phone].    Once again, we want to ensure your discharge home is safe and that you have a clear understanding of any next steps in your care. If you have any questions or concerns, please do not hesitate to contact us, we are here for you. Thank you for choosing [facility] for your healthcare needs.    Sincerely,    Your [facility] Healthcare Team

## 2017-05-20 NOTE — IP AVS SNAPSHOT
BrainCells Access Code: Activation code not generated  Current BrainCells Status: Active    [employer]  A secure, online tool to manage your health information     [employer]’s BrainCells® is a secure, online tool that connects you to your personalized health information from the privacy of your home -- day or night - making it very easy for you to manage your healthcare. Once the activation process is completed, you can even access your medical information using the BrainCells brice, which is available for free in the Apple Brice store or Google Play store.     BrainCells provides the following levels of access (as shown below):   My Chart Features   [facility] [place] Primary Care Doctor [facility]  Specialists [facility]  Urgent  Care Non-[facility]  Primary Care  Doctor   Email your healthcare team securely and privately 24/7 X X X X   Manage appointments: schedule your next appointment; view details of past/upcoming appointments X      Request prescription refills. X      View recent personal medical records, including lab and immunizations X X X X   View health record, including health history, allergies, medications X X X X   Read reports about your outpatient visits, procedures, consult and ER notes X X X X   See your discharge summary, which is a recap of your hospital and/or ER visit that includes your diagnosis, lab results, and care plan. X X       How to register for BrainCells:  1. Go to  [email or web address].  2. Click on the Sign Up Now box, which takes you to the New Member Sign Up page. You will need to provide the following information:  a. Enter your BrainCells Access Code exactly as it appears at the top of this page. (You will not need to use this code after you’ve completed the sign-up process. If you do not sign up before the expiration date, you must request a new code.)   b. Enter your date of birth.   c. Enter your home email address.   d. Click Submit, and follow the next screen’s instructions.  3. Create a BrainCells ID. This will  be your [employer] login ID and cannot be changed, so think of one that is secure and easy to remember.  4. Create a [employer] password. You can change your password at any time.  5. Enter your Password Reset Question and Answer. This can be used at a later time if you forget your password.   6. Enter your e-mail address. This allows you to receive e-mail notifications when new information is available in [employer].  7. Click Sign Up. You can now view your health information.    For assistance activating your [employer] account, call [phone]

## 2017-05-21 ENCOUNTER — HOSPITAL ENCOUNTER (OUTPATIENT)
Dept: RADIOLOGY | Facility: MEDICAL CENTER | Age: 47
End: 2017-05-21
Attending: EMERGENCY MEDICINE

## 2017-05-21 ENCOUNTER — RESOLUTE PROFESSIONAL BILLING HOSPITAL PROF FEE (OUTPATIENT)
Dept: HOSPITALIST | Facility: MEDICAL CENTER | Age: 47
End: 2017-05-21
Payer: MEDICARE

## 2017-05-21 PROBLEM — R19.7 DIARRHEA: Status: ACTIVE | Noted: 2017-05-21

## 2017-05-21 PROBLEM — R79.89 LFT ELEVATION: Status: ACTIVE | Noted: 2017-05-21

## 2017-05-21 PROBLEM — G43.901 STATUS MIGRAINOSUS: Status: ACTIVE | Noted: 2017-05-21

## 2017-05-21 LAB
AMMONIA PLAS-SCNC: 38 UMOL/L (ref 11–45)
APAP SERPL-MCNC: <10 UG/ML (ref 10–30)
CK SERPL-CCNC: 40 U/L (ref 0–154)
CRP SERPL HS-MCNC: 8.74 MG/DL (ref 0–0.75)
DEPRECATED D DIMER PPP IA-ACNC: 621 NG/ML(D-DU)
ERYTHROCYTE [SEDIMENTATION RATE] IN BLOOD BY WESTERGREN METHOD: 19 MM/HOUR (ref 0–20)
HAV IGM SERPL QL IA: NEGATIVE
HBV CORE IGM SER QL: NEGATIVE
HBV SURFACE AG SER QL: NEGATIVE
HCV AB SER QL: NEGATIVE
LACTATE BLD-SCNC: 1.2 MMOL/L (ref 0.5–2)
MAGNESIUM SERPL-MCNC: 1.8 MG/DL (ref 1.5–2.5)
TSH SERPL DL<=0.005 MIU/L-ACNC: 6.86 UIU/ML (ref 0.3–3.7)

## 2017-05-21 PROCEDURE — 83735 ASSAY OF MAGNESIUM: CPT

## 2017-05-21 PROCEDURE — 84443 ASSAY THYROID STIM HORMONE: CPT

## 2017-05-21 PROCEDURE — A9270 NON-COVERED ITEM OR SERVICE: HCPCS | Performed by: INTERNAL MEDICINE

## 2017-05-21 PROCEDURE — 76705 ECHO EXAM OF ABDOMEN: CPT

## 2017-05-21 PROCEDURE — 700102 HCHG RX REV CODE 250 W/ 637 OVERRIDE(OP): Performed by: INTERNAL MEDICINE

## 2017-05-21 PROCEDURE — 99223 1ST HOSP IP/OBS HIGH 75: CPT | Performed by: HOSPITALIST

## 2017-05-21 PROCEDURE — 85652 RBC SED RATE AUTOMATED: CPT

## 2017-05-21 PROCEDURE — 700102 HCHG RX REV CODE 250 W/ 637 OVERRIDE(OP): Performed by: HOSPITALIST

## 2017-05-21 PROCEDURE — 80307 DRUG TEST PRSMV CHEM ANLYZR: CPT

## 2017-05-21 PROCEDURE — 82140 ASSAY OF AMMONIA: CPT

## 2017-05-21 PROCEDURE — 85379 FIBRIN DEGRADATION QUANT: CPT

## 2017-05-21 PROCEDURE — 82550 ASSAY OF CK (CPK): CPT

## 2017-05-21 PROCEDURE — 700111 HCHG RX REV CODE 636 W/ 250 OVERRIDE (IP): Performed by: HOSPITALIST

## 2017-05-21 PROCEDURE — 700111 HCHG RX REV CODE 636 W/ 250 OVERRIDE (IP): Performed by: INTERNAL MEDICINE

## 2017-05-21 PROCEDURE — 700101 HCHG RX REV CODE 250: Performed by: HOSPITALIST

## 2017-05-21 PROCEDURE — 83605 ASSAY OF LACTIC ACID: CPT

## 2017-05-21 PROCEDURE — A9270 NON-COVERED ITEM OR SERVICE: HCPCS | Performed by: HOSPITALIST

## 2017-05-21 PROCEDURE — 86140 C-REACTIVE PROTEIN: CPT

## 2017-05-21 PROCEDURE — 80074 ACUTE HEPATITIS PANEL: CPT

## 2017-05-21 PROCEDURE — 770001 HCHG ROOM/CARE - MED/SURG/GYN PRIV*

## 2017-05-21 PROCEDURE — 99233 SBSQ HOSP IP/OBS HIGH 50: CPT | Performed by: INTERNAL MEDICINE

## 2017-05-21 RX ORDER — LORAZEPAM 2 MG/ML
0.5 INJECTION INTRAMUSCULAR EVERY 6 HOURS PRN
Status: DISCONTINUED | OUTPATIENT
Start: 2017-05-21 | End: 2017-06-03 | Stop reason: HOSPADM

## 2017-05-21 RX ORDER — SODIUM CHLORIDE AND POTASSIUM CHLORIDE 150; 900 MG/100ML; MG/100ML
INJECTION, SOLUTION INTRAVENOUS CONTINUOUS
Status: DISCONTINUED | OUTPATIENT
Start: 2017-05-21 | End: 2017-05-28

## 2017-05-21 RX ORDER — PROMETHAZINE HYDROCHLORIDE 25 MG/1
25 TABLET ORAL EVERY 6 HOURS PRN
Status: DISCONTINUED | OUTPATIENT
Start: 2017-05-21 | End: 2017-06-03 | Stop reason: HOSPADM

## 2017-05-21 RX ORDER — PREDNISONE 10 MG/1
10 TABLET ORAL DAILY
Status: DISCONTINUED | OUTPATIENT
Start: 2017-05-21 | End: 2017-06-03 | Stop reason: HOSPADM

## 2017-05-21 RX ORDER — OXYCODONE HYDROCHLORIDE 10 MG/1
10 TABLET ORAL EVERY 4 HOURS PRN
Status: DISCONTINUED | OUTPATIENT
Start: 2017-05-21 | End: 2017-05-22

## 2017-05-21 RX ORDER — FERROUS GLUCONATE 324(38)MG
324 TABLET ORAL 2 TIMES DAILY WITH MEALS
Status: DISCONTINUED | OUTPATIENT
Start: 2017-05-21 | End: 2017-06-03 | Stop reason: HOSPADM

## 2017-05-21 RX ORDER — SULFASALAZINE 500 MG/1
1000 TABLET ORAL 2 TIMES DAILY
Status: DISCONTINUED | OUTPATIENT
Start: 2017-05-21 | End: 2017-06-03 | Stop reason: HOSPADM

## 2017-05-21 RX ORDER — KETOROLAC TROMETHAMINE 30 MG/ML
30 INJECTION, SOLUTION INTRAMUSCULAR; INTRAVENOUS EVERY 6 HOURS PRN
Status: DISCONTINUED | OUTPATIENT
Start: 2017-05-21 | End: 2017-05-25

## 2017-05-21 RX ORDER — BISACODYL 10 MG
10 SUPPOSITORY, RECTAL RECTAL
Status: DISCONTINUED | OUTPATIENT
Start: 2017-05-21 | End: 2017-05-24

## 2017-05-21 RX ORDER — ASPIRIN 81 MG/1
81 TABLET, CHEWABLE ORAL DAILY
Status: DISCONTINUED | OUTPATIENT
Start: 2017-05-21 | End: 2017-05-25

## 2017-05-21 RX ORDER — CALCIUM CARBONATE 500(1250)
500 TABLET ORAL 2 TIMES DAILY WITH MEALS
Status: DISCONTINUED | OUTPATIENT
Start: 2017-05-21 | End: 2017-06-03 | Stop reason: HOSPADM

## 2017-05-21 RX ORDER — ONDANSETRON 2 MG/ML
4 INJECTION INTRAMUSCULAR; INTRAVENOUS EVERY 4 HOURS PRN
Status: DISCONTINUED | OUTPATIENT
Start: 2017-05-21 | End: 2017-05-24

## 2017-05-21 RX ORDER — ACETAZOLAMIDE 500 MG/1
500 CAPSULE, EXTENDED RELEASE ORAL 2 TIMES DAILY
Status: DISCONTINUED | OUTPATIENT
Start: 2017-05-21 | End: 2017-05-31

## 2017-05-21 RX ORDER — GABAPENTIN 300 MG/1
600 CAPSULE ORAL 3 TIMES DAILY
Status: DISCONTINUED | OUTPATIENT
Start: 2017-05-21 | End: 2017-06-03 | Stop reason: HOSPADM

## 2017-05-21 RX ORDER — DIHYDROERGOTAMINE MESYLATE 1 MG/ML
1 INJECTION, SOLUTION INTRAMUSCULAR; INTRAVENOUS; SUBCUTANEOUS ONCE
Status: COMPLETED | OUTPATIENT
Start: 2017-05-21 | End: 2017-05-21

## 2017-05-21 RX ORDER — RISPERIDONE 0.5 MG/1
0.5 TABLET ORAL 2 TIMES DAILY
Status: DISCONTINUED | OUTPATIENT
Start: 2017-05-21 | End: 2017-05-24

## 2017-05-21 RX ORDER — DIHYDROERGOTAMINE MESYLATE 1 MG/ML
0.5 INJECTION, SOLUTION INTRAMUSCULAR; INTRAVENOUS; SUBCUTANEOUS ONCE
Status: COMPLETED | OUTPATIENT
Start: 2017-05-21 | End: 2017-05-21

## 2017-05-21 RX ORDER — POLYETHYLENE GLYCOL 3350 17 G/17G
1 POWDER, FOR SOLUTION ORAL
Status: DISCONTINUED | OUTPATIENT
Start: 2017-05-21 | End: 2017-05-24

## 2017-05-21 RX ORDER — FOLIC ACID 1 MG/1
1 TABLET ORAL DAILY
Status: DISCONTINUED | OUTPATIENT
Start: 2017-05-21 | End: 2017-06-03 | Stop reason: HOSPADM

## 2017-05-21 RX ORDER — ONDANSETRON 4 MG/1
4 TABLET, ORALLY DISINTEGRATING ORAL EVERY 4 HOURS PRN
Status: DISCONTINUED | OUTPATIENT
Start: 2017-05-21 | End: 2017-05-24

## 2017-05-21 RX ORDER — AMOXICILLIN 250 MG
2 CAPSULE ORAL 2 TIMES DAILY
Status: DISCONTINUED | OUTPATIENT
Start: 2017-05-21 | End: 2017-05-24

## 2017-05-21 RX ORDER — LORAZEPAM 1 MG/1
0.5 TABLET ORAL EVERY 6 HOURS PRN
Status: DISCONTINUED | OUTPATIENT
Start: 2017-05-21 | End: 2017-06-03 | Stop reason: HOSPADM

## 2017-05-21 RX ORDER — DULOXETIN HYDROCHLORIDE 60 MG/1
60 CAPSULE, DELAYED RELEASE ORAL 2 TIMES DAILY
Status: DISCONTINUED | OUTPATIENT
Start: 2017-05-21 | End: 2017-06-03 | Stop reason: HOSPADM

## 2017-05-21 RX ADMIN — RISPERIDONE 0.5 MG: 0.5 TABLET, FILM COATED ORAL at 03:06

## 2017-05-21 RX ADMIN — FOLIC ACID 1 MG: 1 TABLET ORAL at 08:16

## 2017-05-21 RX ADMIN — KETOROLAC TROMETHAMINE 30 MG: 30 INJECTION, SOLUTION INTRAMUSCULAR at 09:59

## 2017-05-21 RX ADMIN — POTASSIUM CHLORIDE AND SODIUM CHLORIDE: 900; 150 INJECTION, SOLUTION INTRAVENOUS at 03:06

## 2017-05-21 RX ADMIN — RISPERIDONE 0.5 MG: 0.5 TABLET, FILM COATED ORAL at 21:49

## 2017-05-21 RX ADMIN — ONDANSETRON 4 MG: 2 INJECTION INTRAMUSCULAR; INTRAVENOUS at 21:49

## 2017-05-21 RX ADMIN — HYDROMORPHONE HYDROCHLORIDE 0.5 MG: 1 INJECTION, SOLUTION INTRAMUSCULAR; INTRAVENOUS; SUBCUTANEOUS at 04:26

## 2017-05-21 RX ADMIN — SULFASALAZINE 1000 MG: 500 TABLET ORAL at 08:14

## 2017-05-21 RX ADMIN — MAGNESIUM GLUCONATE 500 MG ORAL TABLET 400 MG: 500 TABLET ORAL at 08:16

## 2017-05-21 RX ADMIN — FERROUS GLUCONATE 324 MG: 324 TABLET ORAL at 08:14

## 2017-05-21 RX ADMIN — DULOXETINE HYDROCHLORIDE 60 MG: 60 CAPSULE, DELAYED RELEASE ORAL at 21:49

## 2017-05-21 RX ADMIN — SULFASALAZINE 1000 MG: 500 TABLET ORAL at 03:05

## 2017-05-21 RX ADMIN — ACETAZOLAMIDE 500 MG: 500 CAPSULE, EXTENDED RELEASE ORAL at 03:05

## 2017-05-21 RX ADMIN — DULOXETINE HYDROCHLORIDE 60 MG: 60 CAPSULE, DELAYED RELEASE ORAL at 08:15

## 2017-05-21 RX ADMIN — KETOROLAC TROMETHAMINE 30 MG: 30 INJECTION, SOLUTION INTRAMUSCULAR at 23:23

## 2017-05-21 RX ADMIN — POTASSIUM CHLORIDE AND SODIUM CHLORIDE: 900; 150 INJECTION, SOLUTION INTRAVENOUS at 18:56

## 2017-05-21 RX ADMIN — PREDNISONE 10 MG: 10 TABLET ORAL at 08:17

## 2017-05-21 RX ADMIN — LORAZEPAM 0.5 MG: 2 INJECTION INTRAMUSCULAR at 21:58

## 2017-05-21 RX ADMIN — SULFASALAZINE 1000 MG: 500 TABLET ORAL at 21:49

## 2017-05-21 RX ADMIN — DIHYDROERGOTAMINE MESYLATE 1 MG: 1 INJECTION, SOLUTION INTRAMUSCULAR; INTRAVENOUS; SUBCUTANEOUS at 03:18

## 2017-05-21 RX ADMIN — STANDARDIZED SENNA CONCENTRATE AND DOCUSATE SODIUM 2 TABLET: 8.6; 5 TABLET, FILM COATED ORAL at 21:49

## 2017-05-21 RX ADMIN — STANDARDIZED SENNA CONCENTRATE AND DOCUSATE SODIUM 2 TABLET: 8.6; 5 TABLET, FILM COATED ORAL at 08:16

## 2017-05-21 RX ADMIN — OXYCODONE HYDROCHLORIDE 10 MG: 5 TABLET ORAL at 21:49

## 2017-05-21 RX ADMIN — OXYCODONE HYDROCHLORIDE 10 MG: 5 TABLET ORAL at 08:17

## 2017-05-21 RX ADMIN — KETOROLAC TROMETHAMINE 30 MG: 30 INJECTION, SOLUTION INTRAMUSCULAR at 17:09

## 2017-05-21 RX ADMIN — ENOXAPARIN SODIUM 40 MG: 100 INJECTION SUBCUTANEOUS at 08:17

## 2017-05-21 RX ADMIN — LORAZEPAM 0.5 MG: 1 TABLET ORAL at 13:08

## 2017-05-21 RX ADMIN — GABAPENTIN 600 MG: 300 CAPSULE ORAL at 08:15

## 2017-05-21 RX ADMIN — GABAPENTIN 600 MG: 300 CAPSULE ORAL at 13:08

## 2017-05-21 RX ADMIN — FERROUS GLUCONATE 324 MG: 324 TABLET ORAL at 17:12

## 2017-05-21 RX ADMIN — ACETAZOLAMIDE 500 MG: 500 CAPSULE, EXTENDED RELEASE ORAL at 21:50

## 2017-05-21 RX ADMIN — OXYCODONE HYDROCHLORIDE 10 MG: 5 TABLET ORAL at 17:09

## 2017-05-21 RX ADMIN — RISPERIDONE 0.5 MG: 0.5 TABLET, FILM COATED ORAL at 08:16

## 2017-05-21 RX ADMIN — GABAPENTIN 600 MG: 300 CAPSULE ORAL at 21:49

## 2017-05-21 RX ADMIN — Medication 500 MG: at 17:12

## 2017-05-21 RX ADMIN — DIHYDROERGOTAMINE MESYLATE 0.5 MG: 1 INJECTION, SOLUTION INTRAMUSCULAR; INTRAVENOUS; SUBCUTANEOUS at 09:45

## 2017-05-21 RX ADMIN — OXYCODONE HYDROCHLORIDE 10 MG: 5 TABLET ORAL at 13:08

## 2017-05-21 RX ADMIN — OXYCODONE HYDROCHLORIDE 10 MG: 5 TABLET ORAL at 02:10

## 2017-05-21 RX ADMIN — ACETAZOLAMIDE 500 MG: 500 CAPSULE, EXTENDED RELEASE ORAL at 08:15

## 2017-05-21 RX ADMIN — Medication 500 MG: at 08:15

## 2017-05-21 RX ADMIN — PROMETHAZINE HYDROCHLORIDE 25 MG: 25 TABLET ORAL at 09:59

## 2017-05-21 RX ADMIN — ASPIRIN 81 MG: 81 TABLET, CHEWABLE ORAL at 08:15

## 2017-05-21 ASSESSMENT — PATIENT HEALTH QUESTIONNAIRE - PHQ9
2. FEELING DOWN, DEPRESSED, IRRITABLE, OR HOPELESS: NOT AT ALL
SUM OF ALL RESPONSES TO PHQ9 QUESTIONS 1 AND 2: 0
SUM OF ALL RESPONSES TO PHQ QUESTIONS 1-9: 0
1. LITTLE INTEREST OR PLEASURE IN DOING THINGS: NOT AT ALL

## 2017-05-21 ASSESSMENT — ENCOUNTER SYMPTOMS
NECK PAIN: 0
FEVER: 0
DEPRESSION: 0
WEAKNESS: 1
EYES NEGATIVE: 1
PSYCHIATRIC NEGATIVE: 1
GASTROINTESTINAL NEGATIVE: 1
HEADACHES: 1
RESPIRATORY NEGATIVE: 1
MYALGIAS: 1
COUGH: 0
CARDIOVASCULAR NEGATIVE: 1
DIZZINESS: 0
HEARTBURN: 0
BLURRED VISION: 0
BRUISES/BLEEDS EASILY: 0

## 2017-05-21 ASSESSMENT — PAIN SCALES - GENERAL
PAINLEVEL_OUTOF10: 6
PAINLEVEL_OUTOF10: 9
PAINLEVEL_OUTOF10: 6
PAINLEVEL_OUTOF10: 9
PAINLEVEL_OUTOF10: 6

## 2017-05-21 ASSESSMENT — COPD QUESTIONNAIRES
HAVE YOU SMOKED AT LEAST 100 CIGARETTES IN YOUR ENTIRE LIFE: NO/DON'T KNOW
COPD SCREENING SCORE: 1
DURING THE PAST 4 WEEKS HOW MUCH DID YOU FEEL SHORT OF BREATH: NONE/LITTLE OF THE TIME
DO YOU EVER COUGH UP ANY MUCUS OR PHLEGM?: YES, A FEW DAYS A WEEK OR MONTH

## 2017-05-21 ASSESSMENT — LIFESTYLE VARIABLES
EVER_SMOKED: NEVER
EVER_SMOKED: NEVER
ALCOHOL_USE: NO

## 2017-05-21 NOTE — PROGRESS NOTES
Pt arrived from ER via bruce, VSS- tachycardia, denies chest pain/SOB, c/o headache and joint pain, pain meds given PTA, pt and  oriented to room/unit, will continue to monitor

## 2017-05-21 NOTE — ED NOTES
Med rec updated and complete  Allergies reviewed.  Pt  Took all morning medications.  Pt denies antibiotic use in last 30 days.

## 2017-05-21 NOTE — PROGRESS NOTES
Hospital Medicine Progress Note, Adult, Complex               Author: Drew Melo Date & Time created: 5/21/2017  11:22 AM     Interval History:  Discussed plan. I had her during her prior stay.  Dilaudid is to stop.  I added toradol.  Previous success with DHE, failed previous valproate. Had neuro consult during stay earlier this month, will hold for now.  She agrees to plan, actually quite pleasant today.    Review of Systems:  Review of Systems   Constitutional: Negative for fever.   HENT: Negative for hearing loss.    Eyes: Negative.  Negative for blurred vision.   Respiratory: Negative.  Negative for cough.    Cardiovascular: Negative.  Negative for chest pain.   Gastrointestinal: Negative.  Negative for heartburn.   Genitourinary: Negative.  Negative for dysuria.   Musculoskeletal: Positive for myalgias. Negative for neck pain.   Skin: Negative.  Negative for rash.   Neurological: Positive for weakness and headaches. Negative for dizziness.   Endo/Heme/Allergies: Negative.  Does not bruise/bleed easily.   Psychiatric/Behavioral: Negative.  Negative for depression.       Physical Exam:  Physical Exam  A&Ox3 comfortable in room  PERRL EOMI mmm  Supple no jvd bruit or bharti  RRR no mrg  CTAB RENETTA  Soft abd ntnd bs+  No edema    Labs:        Invalid input(s): [id number]  Recent Labs      05/21/17 0207   CPKTOTAL  40     Recent Labs      05/20/17 2045 05/21/17 0207   SODIUM  135   --    POTASSIUM  3.0*   --    CHLORIDE  104   --    CO2  19*   --    BUN  17   --    CREATININE  0.75   --    MAGNESIUM   --   1.8   CALCIUM  9.6   --      Recent Labs      05/20/17 2045   ALTSGPT  775*   ASTSGOT  676*   ALKPHOSPHAT  206*   TBILIRUBIN  0.5   GLUCOSE  86     Recent Labs      05/20/17 2045   RBC  4.57   HEMOGLOBIN  14.9   HEMATOCRIT  43.6   PLATELETCT  330     Recent Labs      05/20/17 2045   WBC  14.0*   NEUTSPOLYS  88.40*   LYMPHOCYTES  5.80*   MONOCYTES  5.20   EOSINOPHILS  0.00   BASOPHILS  0.20    ASTSGOT  676*   ALTSGPT  775*   ALKPHOSPHAT  206*   TBILIRUBIN  0.5           Hemodynamics:  Temp (24hrs), Av.5 °C (97.7 °F), Min:36.3 °C (97.3 °F), Max:36.7 °C (98 °F)  Temperature: 36.3 °C (97.3 °F)  Pulse  Av.9  Min: 107  Max: 150Heart Rate (Monitored): (!) 113  Blood Pressure: 108/78 mmHg, NIBP: 115/71 mmHg     Respiratory:    Respiration: 18, Pulse Oximetry: 97 %           Fluids:    Intake/Output Summary (Last 24 hours) at 17 1122  Last data filed at 17 0600   Gross per 24 hour   Intake  677.5 ml   Output      0 ml   Net  677.5 ml     Weight: 71.9 kg (158 lb 8.2 oz)  GI/Nutrition:  Orders Placed This Encounter   Procedures   • Diet Order     Standing Status: Standing      Number of Occurrences: 1      Standing Expiration Date:      Order Specific Question:  Diet:     Answer:  Regular [1]     Medical Decision Making, by Problem:  Active Hospital Problems    Diagnosis                 •  Anxiety [F41.9] - prns    •  Complicated migraine [G43.109] - follow prior neurology recs, start DHE trial, failed valproic acid load during prior visit, use toradol, stop dilaudid, refuses imitrex.    •  Hyperlipidemia [E78.5] - statin    •  Rheumatoid arthritis (CMS-HCC) [M06.9] - MTX, prednisone, sulfasalazine, folate   •  H/O: CVA (cerebrovascular accident) [Z86.73] - ASA, statin    •  Chronic pain disorder [G89.4] - cymbalta, neurontin, prns    •  S/P  shunt [Z98.2]    •  Depression [F32.9] - cymbalta                EKG reviewed, Labs reviewed, Medications reviewed and Radiology images reviewed  Lopez catheter: No Lopez      DVT Prophylaxis: Enoxaparin (Lovenox)    Ulcer prophylaxis: Not indicated

## 2017-05-21 NOTE — ED PROVIDER NOTES
ED Provider Note    Scribed for Dr. Eric Dillard M.D. by Kim Mckinley. 5/20/2017  7:40 PM    Primary care provider: Elliott Power M.D.  Means of arrival: Walk-In  History obtained from: Patient  History limited by: None    CHIEF COMPLAINT  Chief Complaint   Patient presents with   • Headache     Pt states she has hx of complex migraines. Pt is confused and having difficulty finding the right words. Pupils are PERRLA. Hand  are strong and equal bilateral. Lower extremities are strong and equal bilaterally.    • Palpitations     Pt is tachycardicm;    • Altered Mental Status     Pt unable to verbalize time or place. Pt is able to verbalize her event, but gets confused at times. Pt's  at bedside        HPI  Enedelia Pang is a 46 y.o. female with a history of migraines who presents to the Emergency Department for headache onset 2-3 weeks. Patient reports she was seen here 5/6 for the same symptoms and was admitted for 1 week. Significant other notes they came into the ED today for joint pain and migraine and after getting an EKG she became altered and confused unable to verbalize or answer questions. Associated symptoms include numbness to finger tips, joint pain, shortness of breath described as pressure. Patient's signficant other states she has had multiple infections in the past causing her to have a shunt removed. Patient confirms past medical history of stroke. Denies past kidney infections. Denies fever, leg swelling, difficulty urinating.     REVIEW OF SYSTEMS  Pertinent positives include headache, altered mental status, numbness, joint pain. Pertinent negatives include no fever, leg swelling, difficulty urinating. As above, all other systems reviewed and are negative.   See HPI for further details.     PAST MEDICAL HISTORY   has a past medical history of Migraine with aura, with intractable migraine, so stated, without mention of status migrainosus; H/O Clostridium difficile  infection; Hydrocephalus; MRSA infection; Pituitary abnormality (CMS-HCC); Allergy, unspecified not elsewhere classified; Anxiety; Depression; Stroke (CMS-[facility]); and autoimmune.    SURGICAL HISTORY   has past surgical history that includes cholecystectomy; tonsillectomy; appendectomy; tubal ligation; other; other neurological surg; irrigation & debridement neuro (N/A, 6/28/2015); lumbar exploration (N/A, 8/31/2015); and spinal cord stimulator (12/19/2016).    SOCIAL HISTORY  Social History   Substance Use Topics   • Smoking status: Never Smoker    • Smokeless tobacco: Never Used   • Alcohol Use: No      History   Drug Use No       FAMILY HISTORY  History reviewed. No pertinent family history.    CURRENT MEDICATIONS  Home Medications     Reviewed by Matthew Bruno (Pharmacy Tech) on 05/20/17 at 2334  Med List Status: Complete    Medication Last Dose Status    acetaZOLAMIDE SR (DIAMOX) 500 MG CAPSULE SR 12 HR 5/20/2017 Active    calcium carbonate (CALCIUM CARBONATE) 500 MG Tab 5/20/2017 Active    duloxetine (CYMBALTA) 60 MG CPEP delayed-release capsule 5/20/2017 Active    ferrous gluconate (FERGON) 324 (38 FE) MG Tab 5/20/2017 Active    folic acid (FOLVITE) 1 MG Tab 5/20/2017 Active    gabapentin (NEURONTIN) 600 MG tablet 5/20/2017 Active    magnesium oxide (MAG-OX) 400 MG Tab 5/20/2017 Active    methotrexate 2.5 MG Tab 5/18/2017 Active    Multiple Vitamins-Minerals (MULTIVITAMIN ADULT PO) 5/20/2017 Active    omeprazole (PRILOSEC) 20 MG delayed-release capsule 5/20/2017 Active    oxycodone immediate release (ROXICODONE) 10 MG immediate release tablet 5/20/2017 Active    predniSONE (DELTASONE) 5 MG Tab 5/20/2017 Active    risperidone (RISPERDAL) 0.5 MG Tab 5/20/2017 Active    sulfaSALAzine (AZULFIDINE) 500 MG Tab 5/20/2017 Active    Valerian Root 500 MG Cap 5/19/2017 Active    vitamin D (VITAMIND D3) 1000 UNIT Tab 5/20/2017 Active                ALLERGIES  Allergies   Allergen Reactions   • Prochlorperazine Swelling  "    Tongue swelling. Reaction as a teen. (compazine)  Tolerated Phenergan on 02/24/15   • Sumatriptan Unspecified     Historical=\"Heart stops.\" Reaction  between 1995 to 1997   • Vancomycin Shortness of Breath and Rash     Reaction in 2005.  D/W patient 8/31/15 - tolerated loading dose of vancomycin administered on 8/30/15 with some itching to chest with decreased infusion rate. Red Man Syndrome       PHYSICAL EXAM  VITAL SIGNS: /83 mmHg  Pulse 143  Temp(Src) 36.6 °C (97.9 °F)  Resp 20  Ht 1.6 m (5' 2.99\")  Wt 68.04 kg (150 lb)  BMI 26.58 kg/m2  SpO2 97%    Constitutional: Well developed, Well nourished, mild distress, Non-toxic appearance.   HENT: Normocephalic, Atraumatic, Bilateral external ears normal, Oropharynx moist, No oral exudates.   Eyes: PERRLA, EOMI, Conjunctiva normal, No discharge.   Neck: No tenderness, Supple, No stridor.   Lymphatic: No lymphadenopathy noted.   Cardiovascular: Tachycardic. Normal rhythm.   Thorax & Lungs: Clear to auscultation bilaterally, No respiratory distress, No wheezing, No crackles.   Abdomen: Soft, mild right upper tenderness, No masses, No pulsatile masses.   Skin: Warm, Dry, No erythema, No rash.   Extremities:, No edema No cyanosis.   Musculoskeletal: No tenderness to palpation or major deformities noted.  Intact distal pulses  Neurologic: Confused. Awake, alert. Moves all extremities spontaneously.  Psychiatric: Confused. Affect normal, Judgment normal, Mood normal.       LABS  Results for orders placed or performed during the hospital encounter of 05/20/17   CBC WITH DIFFERENTIAL   Result Value Ref Range    WBC 14.0 (H) 4.8 - 10.8 K/uL    RBC 4.57 4.20 - 5.40 M/uL    Hemoglobin 14.9 12.0 - 16.0 g/dL    Hematocrit 43.6 37.0 - 47.0 %    MCV 95.4 81.4 - 97.8 fL    MCH 32.6 27.0 - 33.0 pg    MCHC 34.2 33.6 - 35.0 g/dL    RDW 45.9 35.9 - 50.0 fL    Platelet Count 330 164 - 446 K/uL    MPV 9.3 9.0 - 12.9 fL    Neutrophils-Polys 88.40 (H) 44.00 - 72.00 %    " Lymphocytes 5.80 (L) 22.00 - 41.00 %    Monocytes 5.20 0.00 - 13.40 %    Eosinophils 0.00 0.00 - 6.90 %    Basophils 0.20 0.00 - 1.80 %    Immature Granulocytes 0.40 0.00 - 0.90 %    Nucleated RBC 0.00 /100 WBC    Neutrophils (Absolute) 12.42 (H) 2.00 - 7.15 K/uL    Lymphs (Absolute) 0.81 (L) 1.00 - 4.80 K/uL    Monos (Absolute) 0.73 0.00 - 0.85 K/uL    Eos (Absolute) 0.00 0.00 - 0.51 K/uL    Baso (Absolute) 0.03 0.00 - 0.12 K/uL    Immature Granulocytes (abs) 0.05 0.00 - 0.11 K/uL    NRBC (Absolute) 0.00 K/uL   COMP METABOLIC PANEL   Result Value Ref Range    Sodium 135 135 - 145 mmol/L    Potassium 3.0 (L) 3.6 - 5.5 mmol/L    Chloride 104 96 - 112 mmol/L    Co2 19 (L) 20 - 33 mmol/L    Anion Gap 12.0 (H) 0.0 - 11.9    Glucose 86 65 - 99 mg/dL    Bun 17 8 - 22 mg/dL    Creatinine 0.75 0.50 - 1.40 mg/dL    Calcium 9.6 8.5 - 10.5 mg/dL    AST(SGOT) 676 (H) 12 - 45 U/L    ALT(SGPT) 775 (H) 2 - 50 U/L    Alkaline Phosphatase 206 (H) 30 - 99 U/L    Total Bilirubin 0.5 0.1 - 1.5 mg/dL    Albumin 4.7 3.2 - 4.9 g/dL    Total Protein 7.7 6.0 - 8.2 g/dL    Globulin 3.0 1.9 - 3.5 g/dL    A-G Ratio 1.6 g/dL   LACTIC ACID   Result Value Ref Range    Lactic Acid 1.7 0.5 - 2.0 mmol/L   URINALYSIS   Result Value Ref Range    Color Lt. Yellow     Character Clear     Specific Gravity 1.005 <1.035    Ph 8.0 5.0-8.0    Glucose Negative Negative mg/dL    Ketones Negative Negative mg/dL    Protein Negative Negative mg/dL    Bilirubin Negative Negative    Nitrite Negative Negative    Leukocyte Esterase Negative Negative    Occult Blood Negative Negative    Micro Urine Req see below    URINE DRUG SCREEN (TRIAGE)   Result Value Ref Range    Amphetamines Urine Negative Negative    Barbiturates Negative Negative    Benzodiazepines Negative Negative    Cocaine Metabolite Negative Negative    Methadone Negative Negative    Ecstasy Negative Negative    Opiates Negative Negative    Oxycodone Positive (A) Negative    Phencyclidine -Pcp Negative  Negative    Propoxyphene Negative Negative    Cannabinoid Metab Negative Negative   ESTIMATED GFR   Result Value Ref Range    GFR If African American >60 >60 mL/min/1.73 m 2    GFR If Non African American >60 >60 mL/min/1.73 m 2   EKG (NOW)   Result Value Ref Range    Report       [facility] Emergency Dept.    Test Date:  2017  Pt Name:    BRIGID DAWSON                 Department: ER  MRN:        [id number]                      Room:  Gender:     F                            [facility]  [facility]        1970                   Requested By:ER TRIAGE PROTOCOL  Order #:    [id number]                    Reading MD:    Measurements  Intervals                                Axis  Rate:       144                          P:          57  NC:         160                          QRS:        -7  QRSD:       76                           T:          205  QT:         205  QTc:        317    Interpretive Statements  SINUS TACHYCARDIA  PROBABLE LEFT ATRIAL ABNORMALITY  NONSPECIFIC T ABNORMALITIES, DIFFUSE LEADS  Compared to ECG 2017 00:47:34  T-wave abnormality now present  Sinus rhythm no longer present       All labs reviewed by me.    EKG  Interpreted by me    Rhythm: Sinus tachycardia  Rate: 144  Axis: normal  Ectopy: none  Conduction: normal  ST Segments: no acute change  T Waves: no acute change  Q Waves: none  Clinical Impression: Normal EKG without acute changes        RADIOLOGY  US-LIVER AND BILIARY TREE   Final Result      1.  Prior cholecystectomy. The common bile duct is within normal limits for a post surgical patient.      2.  Small right renal cyst.      CT-HEAD W/O   Final Result      No evidence of acute intracranial process.      DX-CHEST-PORTABLE (1 VIEW)   Final Result      No evidence of acute cardiopulmonary process.        The radiologist's interpretation of all radiological studies have been reviewed by me.    COURSE & MEDICAL DECISION MAKING  Pertinent Labs & Imaging  studies reviewed. (See chart for details)    7:40 PM - Patient seen and examined at bedside. Patient will be treated with Dilaudid 1 mg, Zofran 4 mg. Ordered DX-chest, CT-head, lactic acid, urine drug screen, estimated GFR, poc urinalysis, poc urine pregnancy, CBC with differential, CMP, lactic acid, blood culture, urinalysis, urine culture, EKG to evaluate her symptoms. The differential diagnoses include but are not limited to: CVA, migraine, sepsis.    10:13 PM - Recheck. Patient's symptoms have not resolved. Her significant other states she still sounds altered and not at baseline. Lab and radiology results were discussed which indicated no evidence of acute intracranial process or acute cardiopulmonary process, however I informed her that her liver enzymes are high. Patient reports to me at this time she has not been eating the last couple of days due to her nausea and pain. I informed the patient that she will need to be admitted for further observation.     10:29 PM - Consulted with Dr. Armstrong (Hospitalist) about the patient's case. Dr. Armstrong agrees to admit.    Decision Making:  As the patient had some right upper quadrant tenderness I did order a right upper quadrant ultrasound which is unremarkable. Her significant findings are elevated liver enzymes sebaceous states she thinks might be secondary to excessive Tylenol use. No other etiologies definitely found. Patient does have some confusion of uncertain cause as well as elevated heart rate and had a don't really have an explanation completely for the for the symptoms. It may be that she is having some sort of withdrawal that doesn't seem likely by history she does not appear to be septic at this point    DISPOSITION:  Patient will be admitted to Dr. Armstrong (Hospitalist) in guarded condition.      FINAL IMPRESSION  1. Altered mental status, unspecified altered mental status type          I, Kim Mckinley (Scribadolfo), am scribing for, and in  the presence of, Eric Dillard M.D..    Electronically signed by: Kim Mckinley (Scribe), 5/20/2017    I, Eric Dillard M.D. personally performed the services described in this documentation, as scribed by Kim Mckinley in my presence, and it is both accurate and complete.    The note accurately reflects work and decisions made by me.  Eric Dillard  5/21/2017  1:11 AM

## 2017-05-21 NOTE — CARE PLAN
Problem: Pain Management  Goal: Pain level will decrease to patient’s comfort goal  Outcome: PROGRESSING AS EXPECTED  Patients pain is managed by oral and Iv narcotics.

## 2017-05-21 NOTE — PROGRESS NOTES
Two RN skin check complete, skin intact, no evidence of skin breakdown, generalized bruising, pt refuses bed alarm, educated on fall prevention and safety,  at bedside, CN aware

## 2017-05-21 NOTE — ED NOTES
WC to triage w/ c/o headache & palpitations onset 1130.  During triage pt is having word finding impairment and confusion.

## 2017-05-21 NOTE — ED NOTES
Pt report received from triage RN.   Chief Complaint   Patient presents with   • Headache     Pt states she has hx of complex migraines. Pt is confused and having difficulty finding the right words. Pupils are PERRLA. Hand  are strong and equal bilateral. Lower extremities are strong and equal bilaterally.    • Palpitations     Pt is tachycardicm;    • Altered Mental Status     Pt unable to verbalize time or place. Pt is able to verbalize her event, but gets confused at times. Pt's  at bedside      Pt states migraine started mildly 2 weeks ago, but has intensified this afternoon. Pt was AOx4 per pt's , since pt arrived in ED mental status has changed to confused.   Pt has hx of stroke.  Pt changed into gown and placed on monitor. Chart up and ready for ERP now.

## 2017-05-21 NOTE — H&P
DATE OF ADMISSION:  05/20/2017    IDENTIFICATION:  This is a 46-year-old female.    PRIMARY CARE PHYSICIAN:  Elliott Power MD    RHEUMATOLOGY:  Dr. Andrade.    NEUROLOGIST:  At [facility] with Dr. Velasquez, although she   is in the process of getting established today at [facility] neurologist   apparently.    CHIEF COMPLAINT:  Persistent migraines, palpitations and altered mentation.    HISTORY OF PRESENT ILLNESS:  This is a 46-year-old female, who was recently   admitted and discharged here at the hospital, discharged on 05/07/2017, where   the patient was admitted and treated for migraine headaches.  At that time,   suicidal ideation with a reported history of stroke, chest pain and various   other complaints.  The patient apparently responded to DHEA at that time in   terms of her headaches, she was supposed to follow up with neurology, but this   has not happened yet.  The patient comes in with complaints of ongoing   headaches, throbbing body and joint pain, muscle pains as well as ongoing   difficulties with uncontrolled headaches and then in the process, she   developed some difficulty with remembering and her mentation was altered per   her  and the patient is currently improved while here in the emergency   room, she had imaging and laboratory data performed, which did not reveal any   particularly pointing abnormalities, although she does have white cell count   elevation, which she also has had in the past.  She is significantly   tachycardic and has hypokalemia.  She has also an increased LFT level, which   she states has also been present in the past, although not as pronounced as   current.  The patient apparently has an inflammatory condition, sees a   rheumatologist and is in the process of being diagnosed.  She is on   methotrexate though at this time.  The patient might have used significant   amount of Tylenol, Motrin, Benadryl and Toradol at home to control her pain.    She  has been diagnosed in the past with Clostridium difficile and states that   she has ongoing watery diarrhea.  This was reevaluated during her recent   hospitalization here as well and the  stated that she was tested   negative, although I do not have those results available to me at this time.    The patient will be admitted for further workup and evaluation.  The patient   is currently improved.  She still is slightly tachycardic, though improved.    She denies any fever.    ALLERGIES:  1.  PROCHLORPERAZINE.  2.  SUMATRIPTAN.  3.  VANCOMYCIN.    MEDICATIONS REGIMEN:  1.  Diamox 500 mg p.o. b.i.d.  2.  Calcium carbonate 1 tablet p.o. b.i.d. 500 mg.  3.  Cymbalta 60 mg b.i.d.  4.  Ferrous gluconate 324 mg p.o. b.i.d.  5.  Folic acid 1 mg daily.  6.  Neurontin 600 mg p.o. t.i.d.  7.  Magnesium oxide 400 mg daily.  8.  Methotrexate 20 mg on Thursdays.  9.  Multivitamin daily.  10.  Omeprazole 20 mg daily.  11.  Oxycodone 10 mg q.4 hours p.r.n.  12.  Prednisone 10 mg daily.  13.  Risperdal 0.5 mg p.o. b.i.d.  14.  Sulfasalazine 500 mg 2 tablets p.o. b.i.d.  15.  Valerian root 500 mg at bedtime.  16.  Vitamin D 1000 units daily.    PAST MEDICAL HISTORY:  1.  Migraine headaches.  2.  History of reported CVA.  3.  History of suicidal ideation.  4.  History of depression.  5.  History of Clostridium difficile.  6.  History of occipital nerve stimulator implantation.  7.  History of rheumatoid arthritis?  8.  History of neuropathy.  9.  History of lumber spine stimulator implantation.  10.  History of anxiety, depression.  11.  History of  shunt, apparently this was removed.  12.  Chronic intermittent hypokalemia.    PAST SURGICAL HISTORY:  1.  History of cholecystectomy.  2.  History of tonsillectomy.  3.  History of appendectomy.  4.  History of tubal ligation.  5.  History of right knee surgery.  6.  History of occipital nerve stimulator implantation.  7.  History of lumbar exploration, spinal cord stimulator  implantation.    SOCIAL HISTORY:  The patient is a nonsmoker, nondrinker.  She lives with her   .  She wants to be a full code.    FAMILY HISTORY:  Irritable bowel syndrome.  Father has heart disease and   diabetes.    REVIEW OF SYSTEMS:  Negative per AMA and CMS criteria other than outlined in   HPI.    PHYSICAL EXAMINATION:  VITAL SIGNS:  The patient is found to have temperature 36.6, pulse initially   in the 140s now in the 110s, respirations 19, blood pressure 112/75 with   patient is saturating 94% on room air.  GENERAL:  This is a chronically ill-appearing female, in no acute distress, in   no acute respiratory distress.  She does not appear toxic or febrile at this   time.  HEENT:  Normocephalic, atraumatic.  EOMI.  PERRLA.  Patient is covering her   eyes, she has constant headache.  NECK:  Free range of Motion.  No lymphadenopathy or thyromegaly.  CHEST:  Normal bony structures.  LUNGS:  Clear to auscultation bilaterally.  No wheezes, rales, or rhonchi.  HEART:  Tachycardia, S1, S2 appear normal.  No S3 or S4.  ABDOMEN:  Soft and protuberant.  There is no tenderness.  GENITOURINARY:  Normal external female genitalia.  RECTAL:  Exam is deferred.  MUSCULOSKELETAL:  No clubbing, cyanosis or edema.  NEUROLOGIC:  The patient is alert and oriented x4.  Cranial currently there is   no sensory loss, there is no focal weakness.  She is mentating well.    LABORATORY DATA AND IMAGING:  As follows; currently with a white count of 14,   hemoglobin 14.9, hematocrit 43.6, platelet count 330, neutrophils 88.  Her   sodium is 135, potassium 3.0, chloride 104, bicarbonate 19, glucose 86, BUN is   17, creatinine 0.75.  LFT is elevated with AST of 676 and ALT of 775,   alkaline phosphatase is 206.  Her lactic acid is 1.7.  Urinalysis is grossly   negative.  Her urine drug screen is positive for oxycodone.  CT of the head is   unremarkable.  The chest x-ray is also negative.    ASSESSMENT AND PLAN:  1.  Currently,  possible status migrainosus.  The patient will be started on   DHEA as well as pain regimen.  She should be referred to neurology in the   morning for further symptom control and hopefully an alternative regimen for   her to control her migraine headaches.  2.  Altered mentation, certainly consistent with possible complex migraine.  The   patient will be observed closely.  CT scan is negative for acute changes.  MRI   unable to be performed as she has an occipital nerve stimulator, doubt the   patient had a stroke though, on an overall basis, we will start patient on a   low dose aspirin preventatively.  3.  ____ with possible Tylenol overuse.  We will check a Tylenol level.    Follow up closely at this time, I do not have evidence that the patient   actually overdosed on Tylenol.  Consideration is to initiate Mucomyst   protocol.  4.  Confusion.  Again, possibility of some liver compromise.  We will check an   ammonia level as well.  5.  Diarrhea.  Recheck a Clostridium difficile, I do not see the test from the   last hospitalization.  The patient has continued watery diarrhea,   leukocytosis and tachycardia.  6.  Tachycardia, could be multifactorial.  Patient will be hydrated well.  She   will be pain controlled and monitored closely.  7.  History of depression and anxiety.  8.  History of rheumatologic condition, currently on methotrexate, this is   unclear, we will check inflammatory markers at this time.  9.  Muscular aches and muscle pains as per history of present illness.  I will   check a CPK.  10.  Remote chance that the patient might have the thromboembolic condition as   she is not ambulating much, we will check a D-dimer and consider a   thromboembolic workup if that is abnormal.  11.  Leukocytosis, I do not see any definitive infectious source other than   possible diarrhea.  We will follow this, refrain from antibiotics at this   time.  12.  Hypokalemia, we will hydrate the patient with IV fluids  containing   potassium.    OVERALL PLAN:  The patient is admitted with a multitude of complaints, but   primarily status migrainosus as well as tachycardia with multiple other   abnormalities as outlined.  For full further details, please refer to the   computer system and paper chart.  The patient will be admitted to neurology.    She will likely require 2+ overnight stay given her severity of symptoms.       ____________________________________     MD MOIZ TEAGUE / EUGENE    DD:  05/21/2017 00:32:04  DT:  05/21/2017 07:00:24    D#:  [id number]  Job#:  [id number]

## 2017-05-22 PROBLEM — M06.9 RHEUMATOID ARTHRITIS(714.0): Status: ACTIVE | Noted: 2017-05-22

## 2017-05-22 PROBLEM — R52 WHOLE BODY PAIN: Status: ACTIVE | Noted: 2017-05-22

## 2017-05-22 PROBLEM — R09.81 NASAL CONGESTION: Status: ACTIVE | Noted: 2017-05-22

## 2017-05-22 LAB
ALBUMIN SERPL BCP-MCNC: 3.6 G/DL (ref 3.2–4.9)
ALBUMIN/GLOB SERPL: 1.4 G/DL
ALP SERPL-CCNC: 174 U/L (ref 30–99)
ALT SERPL-CCNC: 574 U/L (ref 2–50)
ANION GAP SERPL CALC-SCNC: 8 MMOL/L (ref 0–11.9)
AST SERPL-CCNC: 239 U/L (ref 12–45)
BACTERIA UR CULT: ABNORMAL
BACTERIA UR CULT: ABNORMAL
BASOPHILS # BLD AUTO: 0.5 % (ref 0–1.8)
BASOPHILS # BLD: 0.03 K/UL (ref 0–0.12)
BILIRUB SERPL-MCNC: 0.3 MG/DL (ref 0.1–1.5)
BUN SERPL-MCNC: 9 MG/DL (ref 8–22)
CALCIUM SERPL-MCNC: 8.8 MG/DL (ref 8.5–10.5)
CHLORIDE SERPL-SCNC: 114 MMOL/L (ref 96–112)
CHOLEST SERPL-MCNC: 150 MG/DL (ref 100–199)
CO2 SERPL-SCNC: 19 MMOL/L (ref 20–33)
CREAT SERPL-MCNC: 0.55 MG/DL (ref 0.5–1.4)
EOSINOPHIL # BLD AUTO: 0 K/UL (ref 0–0.51)
EOSINOPHIL NFR BLD: 0 % (ref 0–6.9)
ERYTHROCYTE [DISTWIDTH] IN BLOOD BY AUTOMATED COUNT: 51.7 FL (ref 35.9–50)
GFR SERPL CREATININE-BSD FRML MDRD: >60 ML/MIN/1.73 M 2
GLOBULIN SER CALC-MCNC: 2.5 G/DL (ref 1.9–3.5)
GLUCOSE SERPL-MCNC: 90 MG/DL (ref 65–99)
HCT VFR BLD AUTO: 35.8 % (ref 37–47)
HDLC SERPL-MCNC: 52 MG/DL
HGB BLD-MCNC: 11.6 G/DL (ref 12–16)
IMM GRANULOCYTES # BLD AUTO: 0.03 K/UL (ref 0–0.11)
IMM GRANULOCYTES NFR BLD AUTO: 0.5 % (ref 0–0.9)
LDLC SERPL CALC-MCNC: 86 MG/DL
LYMPHOCYTES # BLD AUTO: 1.74 K/UL (ref 1–4.8)
LYMPHOCYTES NFR BLD: 30.6 % (ref 22–41)
MCH RBC QN AUTO: 32.8 PG (ref 27–33)
MCHC RBC AUTO-ENTMCNC: 32.4 G/DL (ref 33.6–35)
MCV RBC AUTO: 101.1 FL (ref 81.4–97.8)
MONOCYTES # BLD AUTO: 0.65 K/UL (ref 0–0.85)
MONOCYTES NFR BLD AUTO: 11.4 % (ref 0–13.4)
NEUTROPHILS # BLD AUTO: 3.24 K/UL (ref 2–7.15)
NEUTROPHILS NFR BLD: 57 % (ref 44–72)
NRBC # BLD AUTO: 0 K/UL
NRBC BLD AUTO-RTO: 0 /100 WBC
PLATELET # BLD AUTO: 260 K/UL (ref 164–446)
PMV BLD AUTO: 9.7 FL (ref 9–12.9)
POTASSIUM SERPL-SCNC: 3.8 MMOL/L (ref 3.6–5.5)
PROT SERPL-MCNC: 6.1 G/DL (ref 6–8.2)
RBC # BLD AUTO: 3.54 M/UL (ref 4.2–5.4)
SIGNIFICANT IND 70042: ABNORMAL
SITE SITE: ABNORMAL
SODIUM SERPL-SCNC: 141 MMOL/L (ref 135–145)
SOURCE SOURCE: ABNORMAL
TRIGL SERPL-MCNC: 61 MG/DL (ref 0–149)
WBC # BLD AUTO: 5.7 K/UL (ref 4.8–10.8)

## 2017-05-22 PROCEDURE — 700111 HCHG RX REV CODE 636 W/ 250 OVERRIDE (IP): Performed by: HOSPITALIST

## 2017-05-22 PROCEDURE — A9270 NON-COVERED ITEM OR SERVICE: HCPCS | Performed by: INTERNAL MEDICINE

## 2017-05-22 PROCEDURE — A9270 NON-COVERED ITEM OR SERVICE: HCPCS | Performed by: NURSE PRACTITIONER

## 2017-05-22 PROCEDURE — 87040 BLOOD CULTURE FOR BACTERIA: CPT | Mod: 91

## 2017-05-22 PROCEDURE — 700102 HCHG RX REV CODE 250 W/ 637 OVERRIDE(OP): Performed by: HOSPITALIST

## 2017-05-22 PROCEDURE — 80061 LIPID PANEL: CPT

## 2017-05-22 PROCEDURE — 700101 HCHG RX REV CODE 250: Performed by: HOSPITALIST

## 2017-05-22 PROCEDURE — A9270 NON-COVERED ITEM OR SERVICE: HCPCS | Performed by: HOSPITALIST

## 2017-05-22 PROCEDURE — 99232 SBSQ HOSP IP/OBS MODERATE 35: CPT | Performed by: INTERNAL MEDICINE

## 2017-05-22 PROCEDURE — 700111 HCHG RX REV CODE 636 W/ 250 OVERRIDE (IP): Performed by: INTERNAL MEDICINE

## 2017-05-22 PROCEDURE — 700111 HCHG RX REV CODE 636 W/ 250 OVERRIDE (IP): Performed by: PSYCHIATRY & NEUROLOGY

## 2017-05-22 PROCEDURE — 700102 HCHG RX REV CODE 250 W/ 637 OVERRIDE(OP): Performed by: INTERNAL MEDICINE

## 2017-05-22 PROCEDURE — 80053 COMPREHEN METABOLIC PANEL: CPT

## 2017-05-22 PROCEDURE — 770001 HCHG ROOM/CARE - MED/SURG/GYN PRIV*

## 2017-05-22 PROCEDURE — 36415 COLL VENOUS BLD VENIPUNCTURE: CPT

## 2017-05-22 PROCEDURE — 700102 HCHG RX REV CODE 250 W/ 637 OVERRIDE(OP): Performed by: NURSE PRACTITIONER

## 2017-05-22 PROCEDURE — 85025 COMPLETE CBC W/AUTO DIFF WBC: CPT

## 2017-05-22 RX ORDER — METHYLPREDNISOLONE SODIUM SUCCINATE 125 MG/2ML
125 INJECTION, POWDER, LYOPHILIZED, FOR SOLUTION INTRAMUSCULAR; INTRAVENOUS DAILY
Status: COMPLETED | OUTPATIENT
Start: 2017-05-22 | End: 2017-05-23

## 2017-05-22 RX ORDER — MAGNESIUM SULFATE HEPTAHYDRATE 40 MG/ML
2 INJECTION, SOLUTION INTRAVENOUS DAILY
Status: DISPENSED | OUTPATIENT
Start: 2017-05-22 | End: 2017-05-25

## 2017-05-22 RX ORDER — DIPHENHYDRAMINE HYDROCHLORIDE 50 MG/ML
50 INJECTION INTRAMUSCULAR; INTRAVENOUS 3 TIMES DAILY PRN
Status: DISCONTINUED | OUTPATIENT
Start: 2017-05-22 | End: 2017-06-03 | Stop reason: HOSPADM

## 2017-05-22 RX ORDER — DIPHENHYDRAMINE HYDROCHLORIDE 50 MG/ML
25 INJECTION INTRAMUSCULAR; INTRAVENOUS ONCE
Status: COMPLETED | OUTPATIENT
Start: 2017-05-23 | End: 2017-05-23

## 2017-05-22 RX ORDER — MORPHINE SULFATE 15 MG/1
15 TABLET ORAL EVERY 4 HOURS PRN
Status: DISCONTINUED | OUTPATIENT
Start: 2017-05-22 | End: 2017-05-23

## 2017-05-22 RX ORDER — DIPHENHYDRAMINE HYDROCHLORIDE 50 MG/ML
25 INJECTION INTRAMUSCULAR; INTRAVENOUS 3 TIMES DAILY PRN
Status: DISCONTINUED | OUTPATIENT
Start: 2017-05-22 | End: 2017-05-22

## 2017-05-22 RX ORDER — BUTALBITAL, ACETAMINOPHEN AND CAFFEINE 50; 325; 40 MG/1; MG/1; MG/1
1 TABLET ORAL EVERY 6 HOURS PRN
Status: DISCONTINUED | OUTPATIENT
Start: 2017-05-22 | End: 2017-06-03 | Stop reason: HOSPADM

## 2017-05-22 RX ORDER — ECHINACEA PURPUREA EXTRACT 125 MG
2 TABLET ORAL PRN
Status: DISCONTINUED | OUTPATIENT
Start: 2017-05-22 | End: 2017-06-03 | Stop reason: HOSPADM

## 2017-05-22 RX ADMIN — DULOXETINE HYDROCHLORIDE 60 MG: 60 CAPSULE, DELAYED RELEASE ORAL at 08:39

## 2017-05-22 RX ADMIN — MORPHINE SULFATE 15 MG: 15 TABLET ORAL at 19:59

## 2017-05-22 RX ADMIN — GABAPENTIN 600 MG: 300 CAPSULE ORAL at 15:40

## 2017-05-22 RX ADMIN — POTASSIUM CHLORIDE AND SODIUM CHLORIDE: 900; 150 INJECTION, SOLUTION INTRAVENOUS at 01:54

## 2017-05-22 RX ADMIN — BUTALBITAL, ACETAMINOPHEN, AND CAFFEINE 1 TABLET: 50; 325; 40 TABLET ORAL at 22:51

## 2017-05-22 RX ADMIN — PREDNISONE 10 MG: 10 TABLET ORAL at 08:43

## 2017-05-22 RX ADMIN — ASPIRIN 81 MG: 81 TABLET, CHEWABLE ORAL at 08:42

## 2017-05-22 RX ADMIN — MORPHINE SULFATE 15 MG: 15 TABLET ORAL at 15:50

## 2017-05-22 RX ADMIN — DIPHENHYDRAMINE HYDROCHLORIDE 25 MG: 50 INJECTION, SOLUTION INTRAMUSCULAR; INTRAVENOUS at 17:12

## 2017-05-22 RX ADMIN — ONDANSETRON 4 MG: 2 INJECTION INTRAMUSCULAR; INTRAVENOUS at 05:48

## 2017-05-22 RX ADMIN — MAGNESIUM SULFATE IN WATER 2 G: 40 INJECTION, SOLUTION INTRAVENOUS at 13:26

## 2017-05-22 RX ADMIN — RISPERIDONE 0.5 MG: 0.5 TABLET, FILM COATED ORAL at 08:40

## 2017-05-22 RX ADMIN — STANDARDIZED SENNA CONCENTRATE AND DOCUSATE SODIUM 2 TABLET: 8.6; 5 TABLET, FILM COATED ORAL at 15:42

## 2017-05-22 RX ADMIN — FERROUS GLUCONATE 324 MG: 324 TABLET ORAL at 17:24

## 2017-05-22 RX ADMIN — KETOROLAC TROMETHAMINE 30 MG: 30 INJECTION, SOLUTION INTRAMUSCULAR at 13:26

## 2017-05-22 RX ADMIN — POTASSIUM CHLORIDE AND SODIUM CHLORIDE: 900; 150 INJECTION, SOLUTION INTRAVENOUS at 20:00

## 2017-05-22 RX ADMIN — FOLIC ACID 1 MG: 1 TABLET ORAL at 08:43

## 2017-05-22 RX ADMIN — Medication 500 MG: at 08:46

## 2017-05-22 RX ADMIN — OXYCODONE HYDROCHLORIDE 10 MG: 5 TABLET ORAL at 10:18

## 2017-05-22 RX ADMIN — METHYLPREDNISOLONE SODIUM SUCCINATE 125 MG: 125 INJECTION, POWDER, FOR SOLUTION INTRAMUSCULAR; INTRAVENOUS at 17:12

## 2017-05-22 RX ADMIN — GABAPENTIN 600 MG: 300 CAPSULE ORAL at 23:44

## 2017-05-22 RX ADMIN — MORPHINE SULFATE 15 MG: 15 TABLET ORAL at 23:44

## 2017-05-22 RX ADMIN — LORAZEPAM 0.5 MG: 1 TABLET ORAL at 05:48

## 2017-05-22 RX ADMIN — KETOROLAC TROMETHAMINE 30 MG: 30 INJECTION, SOLUTION INTRAMUSCULAR at 22:51

## 2017-05-22 RX ADMIN — RISPERIDONE 0.5 MG: 0.5 TABLET, FILM COATED ORAL at 20:00

## 2017-05-22 RX ADMIN — LORAZEPAM 0.5 MG: 1 TABLET ORAL at 20:00

## 2017-05-22 RX ADMIN — ACETAZOLAMIDE 500 MG: 500 CAPSULE, EXTENDED RELEASE ORAL at 08:42

## 2017-05-22 RX ADMIN — OXYCODONE HYDROCHLORIDE 10 MG: 5 TABLET ORAL at 01:53

## 2017-05-22 RX ADMIN — SULFASALAZINE 1000 MG: 500 TABLET ORAL at 08:40

## 2017-05-22 RX ADMIN — ACETAZOLAMIDE 500 MG: 500 CAPSULE, EXTENDED RELEASE ORAL at 20:02

## 2017-05-22 RX ADMIN — FERROUS GLUCONATE 324 MG: 324 TABLET ORAL at 08:42

## 2017-05-22 RX ADMIN — SULFASALAZINE 1000 MG: 500 TABLET ORAL at 20:02

## 2017-05-22 RX ADMIN — GABAPENTIN 600 MG: 300 CAPSULE ORAL at 08:41

## 2017-05-22 RX ADMIN — KETOROLAC TROMETHAMINE 30 MG: 30 INJECTION, SOLUTION INTRAMUSCULAR at 05:48

## 2017-05-22 RX ADMIN — POTASSIUM CHLORIDE AND SODIUM CHLORIDE: 900; 150 INJECTION, SOLUTION INTRAVENOUS at 09:56

## 2017-05-22 RX ADMIN — ONDANSETRON 4 MG: 2 INJECTION INTRAMUSCULAR; INTRAVENOUS at 20:11

## 2017-05-22 RX ADMIN — DULOXETINE HYDROCHLORIDE 60 MG: 60 CAPSULE, DELAYED RELEASE ORAL at 20:00

## 2017-05-22 RX ADMIN — MAGNESIUM GLUCONATE 500 MG ORAL TABLET 400 MG: 500 TABLET ORAL at 08:41

## 2017-05-22 RX ADMIN — STANDARDIZED SENNA CONCENTRATE AND DOCUSATE SODIUM 2 TABLET: 8.6; 5 TABLET, FILM COATED ORAL at 20:00

## 2017-05-22 RX ADMIN — DIPHENHYDRAMINE HYDROCHLORIDE 25 MG: 50 INJECTION, SOLUTION INTRAMUSCULAR; INTRAVENOUS at 22:51

## 2017-05-22 RX ADMIN — ENOXAPARIN SODIUM 40 MG: 100 INJECTION SUBCUTANEOUS at 08:46

## 2017-05-22 ASSESSMENT — PAIN SCALES - GENERAL
PAINLEVEL_OUTOF10: 9
PAINLEVEL_OUTOF10: 9
PAINLEVEL_OUTOF10: 8
PAINLEVEL_OUTOF10: 8
PAINLEVEL_OUTOF10: 7
PAINLEVEL_OUTOF10: 8
PAINLEVEL_OUTOF10: 9
PAINLEVEL_OUTOF10: 8
PAINLEVEL_OUTOF10: 9

## 2017-05-22 ASSESSMENT — ENCOUNTER SYMPTOMS
VOMITING: 0
PHOTOPHOBIA: 1
FEVER: 0
NAUSEA: 0
HEADACHES: 1
ABDOMINAL PAIN: 0
SHORTNESS OF BREATH: 0

## 2017-05-22 ASSESSMENT — LIFESTYLE VARIABLES: DO YOU DRINK ALCOHOL: NO

## 2017-05-22 NOTE — PROGRESS NOTES
Mary Beth from Lab called with critical result of gram positive cocci aerobic bottle at 0640. Critical lab result read back to Mary Beth.   Camelia JULIEN notified of critical lab result at 0647.  Critical lab result read back by Camelia JULIEN.

## 2017-05-22 NOTE — DIETARY
"Nutrition Services- Poor PO PTA     Enedelia Pang is a 46 y.o. female with admitting DX of Status migrainosus  LFT elevation  Diarrhea    Past Medical History   Diagnosis Date   • Migraine with aura, with intractable migraine, so stated, without mention of status migrainosus    • H/O Clostridium difficile infection    • Hydrocephalus      with lumbar shunt   • Hx MRSA infection    • Pituitary abnormality (CMS-HCC)    • Allergy, unspecified not elsewhere classified    • Anxiety    • Depression    • Stroke (CMS-HCC)      2007   • autoimmune        Height: 160 cm (5' 3\")  Weight: 71.9 kg (158 lb 8.2 oz)  Body mass index is 28.09 kg/(m^2).    Pertinent Labs:   Recent Labs      05/20/17   2045  05/21/17   0207  05/22/17   0331  05/22/17   0509   SODIUM 101  135   --   141   --    POTASSIUM 102  3.0*   --   3.8   --      17   --   9   --    CREATININE 109  0.75   --   0.55   --    GLUCOSE 112  86   --   90   --    CALCIUM 105  9.6   --   8.8   --      676*   --   239*   --      775*   --   574*   --    ALBUMIN 111  4.7   --   3.6   --    TOTAL BILIRUBIN 113  0.5   --   0.3   --    MAGNESIUM 561   --   1.8   --    --    WBC 1501  14.0*   --    --   5.7   HGB 1503  14.9   --    --   11.6*   HCT 1504  43.6   --    --   35.8*   RBC 1502  4.57   --    --   3.54*       Last BM: 05/20/17  Pertinent Medications: diamox, ASA, od-shan, cymbalta, lovenox, fergon, folic acid, gabapentin, methotrexate, gabapentin, prednisone mag-sulfate, risperidone, senna-docusate and sulfasalazine     Skin:      Diet: regular  PO intake: %    Pt appears well-nourished and well-hydrated, reports a healthy appetite     Plan / Recommendation:    Encourage PO intake.     Recommend: Change diet to low fat per Pt request r/t hx cholecystectomy.   Will add peach breakfast smoothie per PT request     Nutrition Rep to see patient daily for meal preferences. Please document PO intake as percentage of meals consumed.     RD " available prn

## 2017-05-22 NOTE — CARE PLAN
Problem: Pain Management  Goal: Pain level will decrease to patient’s comfort goal  Outcome: PROGRESSING AS EXPECTED  Patient continues to complain of pain. Patient utilizes PRN pain meds when necessary.     Problem: Safety  Goal: Will remain free from falls  Outcome: PROGRESSING AS EXPECTED  Patient remains free from falls. Uses call light when needing to use the restroom.

## 2017-05-22 NOTE — PROGRESS NOTES
"Patient reports having multiple conversations with her  and son even though they are not in the room. She reports that when they \"do not answer her\" she is able to reorient to current time and situation.   "

## 2017-05-22 NOTE — CONSULTS
DATE OF SERVICE:  05/22/2017    REQUESTING PHYSICIAN:  Kolton Russo DO    CHIEF COMPLAINT:  Intractable migraine headache.    HISTORY OF PRESENT ILLNESS:  The patient is a 46-year-old right-handed female   with longstanding history of migrainous headache who has failed several   preventive treatment in the past, presented again for evaluation of severe   right-sided headache.  Apparently earlier this month she was admitted for   intractable headache and was discharged to have followup with neurology   clinic; however, she has not seen a neurologist as of yet.  She was discharged   on 05/07/2017.  As mentioned, she had a long-standing history of migraine   headache.  At one point, she was on Topamax for headache prevention, which   failed to improve her headache.  She has tried Depakote, but apparently had   some drug-induced hepatitis with elevated liver enzymes and this was also   discontinued.  She is currently on gabapentin and takes OxyContin for headache   relief.  At some point, she was seen by Dr. Dillan Handley who performs Botox   injection.  Patient also tells me she has increased intracranial pressure for   which she was placed on Diamox.  She says 3 weeks ago, she had a spinal tap   and her opening pressure was 38 cm of water.  She has also been seen by   optometrist for visual field defect as she has bitemporal visual field defect.    Her headache history is very complicated.  At some point, she had   lumboperitoneal shunt due to diagnosis of increased intracranial pressure,   which was removed.  I am not sure if the shunt was not working or there was   other reason.  She now has an occipital nerve stimulator, which was placed   apparently in [place], [place].  She also tells me she had pituitary tumor or   mass, but due to her occipital stimulator, we are not able to obtain brain   MRI.  She says her physician told her to follow up on that in 10 years.    Apparently, they felt this is a benign mass.   As far as her headache   treatment, it appears she has responded to Benadryl and Toradol injection in   the past.  She also takes prednisone 10 mg daily that is likely due to her   rheumatological problem that she has.  She had a brain CT 2 days ago which   revealed no evidence of acute intracranial abnormalities.  Her lab work   reveals elevated liver enzymes with AST of 239 and ALT of 574 with increase   alkaline phosphatase of 174.  Her current headache is located in right frontal   orbital head region, throbbing at a time associated with photophobia and   nausea, but no phonophobia.  She has been having headache for almost 3 weeks.    She rates her headache 9/10.    Her past medical history is significant for longstanding history of   intractable migrainous headache which has been refractory to preventive and   abortive treatment.  She has history of depression with suicidal ideation in   the past, history of occipital nerve stimulator implantation, history of   rheumatoid arthritis, and history of lumboperitoneal shunt placement in the   past, which has been removed.    SOCIAL HISTORY:  She has no history of smoking, drinking, or drug abuse.    FAMILY HISTORY:  Her grandmother and 3 of her children have migraine headache.    REVIEW OF SYSTEMS:  As above.  All other systems are normal, please see Dr. Raphael Armstrong's review of system in his H and P dated 05/21/2017.    PHYSICAL EXAMINATION:  VITAL SIGNS:  Heart rate 78, blood pressure 109/63, respirations 16, O2 sat   98% on room air, temperature 35.8.  NECK:  Supple, no neck rigidity or stiffness.  CARDIOVASCULAR:  Regular rate and rhythm.  LUNGS:  Clear.  ABDOMEN:  Soft.  EXTREMITIES:  No cyanosis or clubbing.  NEUROLOGIC:  She is awake, alert, fully oriented.  Speech and memory within   normal limit.  Facial motor and sensation intact.  Extraocular movements are   full.  Visual fields are full to confrontation, although she appears to have   bitemporal  visual blurriness.  Funduscopic exam attempted, but difficult to   perform due to the small size of her pupil.  Hearing is intact to finger rub   bilaterally.  Tongue midline and protrudes symmetrically.  Palate elevates   symmetrically.  Shoulder shrugs are normal.  Motor examination revealed normal   strength to direct testing of both upper and lower extremity, proximal and   distal.  Sensation intact to light touch, temperature, and pinprick.    Coordination intact finger-to-nose and heel-to-shin testing.  Deep tendon   reflexes are 1+ and equal.  Plantar reflexes are downgoing.    ASSESSMENT AND PLAN:  A 46-year-old female with status migrainosus.  Her   headache has been refractory to most preventive and abortive treatment, though   she has responded to Toradol and diphenhydramine.  She may have underlying   idiopathic increased intracranial pressure.  She had an LP 3 weeks ago with   opening pressure of 38 cm of water.  I would recommend to repeat her spinal   tap with measuring opening pressure and if the pressure is above 200, we will   recommend removing at least 20 mL of CSF.  She will receive Solu-Medrol 125 mg   for 2 days.  We will give her mag sulfate 2 g daily.  She will receive   Toradol 30 mg IV every 8 hours and Benadryl 50 mg every 8 hours, also   Phenergan 12.5 mg q. 6 hours p.r.n. nausea.       ____________________________________     MD ROBBIN Mcnamara / EUGENE    DD:  05/22/2017 11:47:17  DT:  05/22/2017 12:41:58    D#:  [id number]  Job#:  [id number]

## 2017-05-23 ENCOUNTER — APPOINTMENT (OUTPATIENT)
Dept: RADIOLOGY | Facility: MEDICAL CENTER | Age: 47
DRG: 032 | End: 2017-05-23
Attending: INTERNAL MEDICINE
Payer: MEDICARE

## 2017-05-23 PROBLEM — M06.9 RHEUMATOID ARTHRITIS(714.0): Chronic | Status: ACTIVE | Noted: 2017-05-22

## 2017-05-23 LAB
ALBUMIN SERPL BCP-MCNC: 4 G/DL (ref 3.2–4.9)
ALBUMIN/GLOB SERPL: 1.4 G/DL
ALP SERPL-CCNC: 169 U/L (ref 30–99)
ALT SERPL-CCNC: 431 U/L (ref 2–50)
ANION GAP SERPL CALC-SCNC: 10 MMOL/L (ref 0–11.9)
AST SERPL-CCNC: 91 U/L (ref 12–45)
BACTERIA BLD CULT: ABNORMAL
BACTERIA BLD CULT: ABNORMAL
BASOPHILS # BLD AUTO: 0.4 % (ref 0–1.8)
BASOPHILS # BLD: 0.03 K/UL (ref 0–0.12)
BILIRUB SERPL-MCNC: 0.5 MG/DL (ref 0.1–1.5)
BUN SERPL-MCNC: 8 MG/DL (ref 8–22)
BURR CELLS/RBC NFR CSF MANUAL: 0 %
BURR CELLS/RBC NFR CSF MANUAL: 0 %
CALCIUM SERPL-MCNC: 9.3 MG/DL (ref 8.5–10.5)
CHLORIDE SERPL-SCNC: 110 MMOL/L (ref 96–112)
CLARITY CSF: NORMAL
CLARITY CSF: NORMAL
CO2 SERPL-SCNC: 17 MMOL/L (ref 20–33)
COLOR CSF: COLORLESS
COLOR CSF: COLORLESS
COLOR SPUN CSF: COLORLESS
COLOR SPUN CSF: COLORLESS
CREAT SERPL-MCNC: 0.51 MG/DL (ref 0.5–1.4)
EOSINOPHIL # BLD AUTO: 0 K/UL (ref 0–0.51)
EOSINOPHIL NFR BLD: 0 % (ref 0–6.9)
ERYTHROCYTE [DISTWIDTH] IN BLOOD BY AUTOMATED COUNT: 49.7 FL (ref 35.9–50)
GFR SERPL CREATININE-BSD FRML MDRD: >60 ML/MIN/1.73 M 2
GLOBULIN SER CALC-MCNC: 2.9 G/DL (ref 1.9–3.5)
GLUCOSE CSF-MCNC: 67 MG/DL (ref 40–80)
GLUCOSE SERPL-MCNC: 111 MG/DL (ref 65–99)
GRAM STN SPEC: NORMAL
HCT VFR BLD AUTO: 39.3 % (ref 37–47)
HGB BLD-MCNC: 12.7 G/DL (ref 12–16)
IMM GRANULOCYTES # BLD AUTO: 0.05 K/UL (ref 0–0.11)
IMM GRANULOCYTES NFR BLD AUTO: 0.6 % (ref 0–0.9)
LYMPHOCYTES # BLD AUTO: 1.02 K/UL (ref 1–4.8)
LYMPHOCYTES NFR BLD: 13.2 % (ref 22–41)
LYMPHOCYTES NFR CSF: 30 %
MAGNESIUM SERPL-MCNC: 2.1 MG/DL (ref 1.5–2.5)
MCH RBC QN AUTO: 32 PG (ref 27–33)
MCHC RBC AUTO-ENTMCNC: 32.3 G/DL (ref 33.6–35)
MCV RBC AUTO: 99 FL (ref 81.4–97.8)
MONOCYTES # BLD AUTO: 0.27 K/UL (ref 0–0.85)
MONOCYTES NFR BLD AUTO: 3.5 % (ref 0–13.4)
MONONUC CELLS NFR CSF: 34 %
NEUTROPHILS # BLD AUTO: 6.33 K/UL (ref 2–7.15)
NEUTROPHILS NFR BLD: 82.3 % (ref 44–72)
NEUTROPHILS NFR CSF: 36 %
NRBC # BLD AUTO: 0 K/UL
NRBC BLD AUTO-RTO: 0 /100 WBC
PLATELET # BLD AUTO: 373 K/UL (ref 164–446)
PMV BLD AUTO: 9.7 FL (ref 9–12.9)
POTASSIUM SERPL-SCNC: 3.7 MMOL/L (ref 3.6–5.5)
PROT CSF-MCNC: 38 MG/DL (ref 15–45)
PROT SERPL-MCNC: 6.9 G/DL (ref 6–8.2)
RBC # BLD AUTO: 3.97 M/UL (ref 4.2–5.4)
RBC # CSF: 615 CELLS/UL
RBC # CSF: 780 CELLS/UL
SIGNIFICANT IND 70042: ABNORMAL
SIGNIFICANT IND 70042: NORMAL
SITE SITE: ABNORMAL
SITE SITE: NORMAL
SODIUM SERPL-SCNC: 137 MMOL/L (ref 135–145)
SOURCE SOURCE: ABNORMAL
SOURCE SOURCE: NORMAL
SPECIMEN VOL CSF: 22 ML
SPECIMEN VOL CSF: 22 ML
TUBE # CSF: 1
TUBE # CSF: 3
WBC # BLD AUTO: 7.7 K/UL (ref 4.8–10.8)
WBC # CSF: 4 CELLS/UL (ref 0–10)
WBC # CSF: 4 CELLS/UL (ref 0–10)

## 2017-05-23 PROCEDURE — 770001 HCHG ROOM/CARE - MED/SURG/GYN PRIV*

## 2017-05-23 PROCEDURE — 80053 COMPREHEN METABOLIC PANEL: CPT

## 2017-05-23 PROCEDURE — A9270 NON-COVERED ITEM OR SERVICE: HCPCS | Performed by: INTERNAL MEDICINE

## 2017-05-23 PROCEDURE — A9270 NON-COVERED ITEM OR SERVICE: HCPCS | Performed by: NURSE PRACTITIONER

## 2017-05-23 PROCEDURE — 700102 HCHG RX REV CODE 250 W/ 637 OVERRIDE(OP): Performed by: HOSPITALIST

## 2017-05-23 PROCEDURE — 700111 HCHG RX REV CODE 636 W/ 250 OVERRIDE (IP): Performed by: PSYCHIATRY & NEUROLOGY

## 2017-05-23 PROCEDURE — 87205 SMEAR GRAM STAIN: CPT

## 2017-05-23 PROCEDURE — 99232 SBSQ HOSP IP/OBS MODERATE 35: CPT | Performed by: HOSPITALIST

## 2017-05-23 PROCEDURE — 87040 BLOOD CULTURE FOR BACTERIA: CPT | Mod: 91

## 2017-05-23 PROCEDURE — 700102 HCHG RX REV CODE 250 W/ 637 OVERRIDE(OP): Performed by: INTERNAL MEDICINE

## 2017-05-23 PROCEDURE — 700102 HCHG RX REV CODE 250 W/ 637 OVERRIDE(OP): Performed by: PSYCHIATRY & NEUROLOGY

## 2017-05-23 PROCEDURE — 700101 HCHG RX REV CODE 250: Performed by: HOSPITALIST

## 2017-05-23 PROCEDURE — 85025 COMPLETE CBC W/AUTO DIFF WBC: CPT

## 2017-05-23 PROCEDURE — 86694 HERPES SIMPLEX NES ANTBDY: CPT

## 2017-05-23 PROCEDURE — 84157 ASSAY OF PROTEIN OTHER: CPT

## 2017-05-23 PROCEDURE — 36415 COLL VENOUS BLD VENIPUNCTURE: CPT

## 2017-05-23 PROCEDURE — A9270 NON-COVERED ITEM OR SERVICE: HCPCS | Performed by: HOSPITALIST

## 2017-05-23 PROCEDURE — 89051 BODY FLUID CELL COUNT: CPT

## 2017-05-23 PROCEDURE — 700111 HCHG RX REV CODE 636 W/ 250 OVERRIDE (IP): Performed by: HOSPITALIST

## 2017-05-23 PROCEDURE — 009U3ZX DRAINAGE OF SPINAL CANAL, PERCUTANEOUS APPROACH, DIAGNOSTIC: ICD-10-PCS | Performed by: RADIOLOGY

## 2017-05-23 PROCEDURE — 87070 CULTURE OTHR SPECIMN AEROBIC: CPT

## 2017-05-23 PROCEDURE — 83735 ASSAY OF MAGNESIUM: CPT

## 2017-05-23 PROCEDURE — 82945 GLUCOSE OTHER FLUID: CPT

## 2017-05-23 PROCEDURE — 700111 HCHG RX REV CODE 636 W/ 250 OVERRIDE (IP): Performed by: NURSE PRACTITIONER

## 2017-05-23 PROCEDURE — 82784 ASSAY IGA/IGD/IGG/IGM EACH: CPT

## 2017-05-23 PROCEDURE — 700102 HCHG RX REV CODE 250 W/ 637 OVERRIDE(OP): Performed by: NURSE PRACTITIONER

## 2017-05-23 PROCEDURE — B01B1ZZ FLUOROSCOPY OF SPINAL CORD USING LOW OSMOLAR CONTRAST: ICD-10-PCS | Performed by: RADIOLOGY

## 2017-05-23 PROCEDURE — 700111 HCHG RX REV CODE 636 W/ 250 OVERRIDE (IP)

## 2017-05-23 PROCEDURE — A9270 NON-COVERED ITEM OR SERVICE: HCPCS | Performed by: PSYCHIATRY & NEUROLOGY

## 2017-05-23 PROCEDURE — 700111 HCHG RX REV CODE 636 W/ 250 OVERRIDE (IP): Performed by: INTERNAL MEDICINE

## 2017-05-23 PROCEDURE — 62270 DX LMBR SPI PNXR: CPT

## 2017-05-23 RX ORDER — ZOLPIDEM TARTRATE 5 MG/1
10 TABLET ORAL NIGHTLY PRN
Status: DISCONTINUED | OUTPATIENT
Start: 2017-05-23 | End: 2017-06-03 | Stop reason: HOSPADM

## 2017-05-23 RX ORDER — QUETIAPINE FUMARATE 25 MG/1
TABLET, FILM COATED ORAL
Status: ACTIVE
Start: 2017-05-23 | End: 2017-05-24

## 2017-05-23 RX ORDER — RISPERIDONE 0.5 MG/1
TABLET ORAL
Status: ACTIVE
Start: 2017-05-23 | End: 2017-05-24

## 2017-05-23 RX ORDER — METHYLPREDNISOLONE SODIUM SUCCINATE 40 MG/ML
INJECTION, POWDER, LYOPHILIZED, FOR SOLUTION INTRAMUSCULAR; INTRAVENOUS
Status: ACTIVE
Start: 2017-05-23 | End: 2017-05-24

## 2017-05-23 RX ORDER — QUETIAPINE FUMARATE 25 MG/1
25 TABLET, FILM COATED ORAL 3 TIMES DAILY
Status: DISCONTINUED | OUTPATIENT
Start: 2017-05-23 | End: 2017-05-24

## 2017-05-23 RX ORDER — ACETAMINOPHEN 325 MG/1
1000 TABLET ORAL EVERY 8 HOURS
Status: DISCONTINUED | OUTPATIENT
Start: 2017-05-23 | End: 2017-05-23

## 2017-05-23 RX ORDER — LORAZEPAM 2 MG/ML
INJECTION INTRAMUSCULAR
Status: COMPLETED
Start: 2017-05-23 | End: 2017-05-23

## 2017-05-23 RX ORDER — OXYCODONE HYDROCHLORIDE 10 MG/1
10 TABLET ORAL EVERY 4 HOURS PRN
Status: DISCONTINUED | OUTPATIENT
Start: 2017-05-23 | End: 2017-06-03 | Stop reason: HOSPADM

## 2017-05-23 RX ORDER — KETOROLAC TROMETHAMINE 30 MG/ML
INJECTION, SOLUTION INTRAMUSCULAR; INTRAVENOUS
Status: ACTIVE
Start: 2017-05-23 | End: 2017-05-24

## 2017-05-23 RX ADMIN — OXYCODONE HYDROCHLORIDE 10 MG: 10 TABLET ORAL at 17:12

## 2017-05-23 RX ADMIN — POTASSIUM CHLORIDE AND SODIUM CHLORIDE: 900; 150 INJECTION, SOLUTION INTRAVENOUS at 12:18

## 2017-05-23 RX ADMIN — LORAZEPAM 0.5 MG: 2 INJECTION INTRAMUSCULAR; INTRAVENOUS at 19:42

## 2017-05-23 RX ADMIN — FOLIC ACID 1 MG: 1 TABLET ORAL at 09:04

## 2017-05-23 RX ADMIN — GABAPENTIN 600 MG: 300 CAPSULE ORAL at 09:04

## 2017-05-23 RX ADMIN — ACETAZOLAMIDE 500 MG: 500 CAPSULE, EXTENDED RELEASE ORAL at 19:36

## 2017-05-23 RX ADMIN — RISPERIDONE 0.5 MG: 0.5 TABLET, FILM COATED ORAL at 19:37

## 2017-05-23 RX ADMIN — OXYCODONE HYDROCHLORIDE 10 MG: 10 TABLET ORAL at 22:29

## 2017-05-23 RX ADMIN — KETOROLAC TROMETHAMINE 30 MG: 30 INJECTION, SOLUTION INTRAMUSCULAR at 19:38

## 2017-05-23 RX ADMIN — Medication 500 MG: at 09:10

## 2017-05-23 RX ADMIN — KETOROLAC TROMETHAMINE 30 MG: 30 INJECTION, SOLUTION INTRAMUSCULAR at 12:18

## 2017-05-23 RX ADMIN — QUETIAPINE FUMARATE 25 MG: 25 TABLET ORAL at 19:36

## 2017-05-23 RX ADMIN — ASPIRIN 81 MG: 81 TABLET, CHEWABLE ORAL at 09:04

## 2017-05-23 RX ADMIN — DIPHENHYDRAMINE HYDROCHLORIDE 25 MG: 50 INJECTION, SOLUTION INTRAMUSCULAR; INTRAVENOUS at 00:10

## 2017-05-23 RX ADMIN — SULFASALAZINE 1000 MG: 500 TABLET ORAL at 19:37

## 2017-05-23 RX ADMIN — GABAPENTIN 600 MG: 300 CAPSULE ORAL at 19:37

## 2017-05-23 RX ADMIN — PREDNISONE 10 MG: 10 TABLET ORAL at 09:04

## 2017-05-23 RX ADMIN — ONDANSETRON 4 MG: 2 INJECTION INTRAMUSCULAR; INTRAVENOUS at 19:42

## 2017-05-23 RX ADMIN — METHYLPREDNISOLONE SODIUM SUCCINATE 125 MG: 125 INJECTION, POWDER, FOR SOLUTION INTRAMUSCULAR; INTRAVENOUS at 19:09

## 2017-05-23 RX ADMIN — POTASSIUM CHLORIDE AND SODIUM CHLORIDE: 900; 150 INJECTION, SOLUTION INTRAVENOUS at 19:47

## 2017-05-23 RX ADMIN — DULOXETINE HYDROCHLORIDE 60 MG: 60 CAPSULE, DELAYED RELEASE ORAL at 09:04

## 2017-05-23 RX ADMIN — LORAZEPAM 0.5 MG: 1 TABLET ORAL at 09:04

## 2017-05-23 RX ADMIN — KETOROLAC TROMETHAMINE 30 MG: 30 INJECTION, SOLUTION INTRAMUSCULAR at 04:46

## 2017-05-23 RX ADMIN — DULOXETINE HYDROCHLORIDE 60 MG: 60 CAPSULE, DELAYED RELEASE ORAL at 19:37

## 2017-05-23 RX ADMIN — LORAZEPAM 0.5 MG: 2 INJECTION INTRAMUSCULAR at 19:38

## 2017-05-23 RX ADMIN — FERROUS GLUCONATE 324 MG: 324 TABLET ORAL at 09:10

## 2017-05-23 RX ADMIN — DIPHENHYDRAMINE HYDROCHLORIDE 50 MG: 50 INJECTION, SOLUTION INTRAMUSCULAR; INTRAVENOUS at 17:20

## 2017-05-23 RX ADMIN — DIPHENHYDRAMINE HYDROCHLORIDE 50 MG: 50 INJECTION, SOLUTION INTRAMUSCULAR; INTRAVENOUS at 09:19

## 2017-05-23 RX ADMIN — ACETAZOLAMIDE 500 MG: 500 CAPSULE, EXTENDED RELEASE ORAL at 09:10

## 2017-05-23 RX ADMIN — RISPERIDONE 0.5 MG: 0.5 TABLET, FILM COATED ORAL at 09:04

## 2017-05-23 RX ADMIN — MORPHINE SULFATE 15 MG: 15 TABLET ORAL at 03:41

## 2017-05-23 RX ADMIN — LORAZEPAM 0.5 MG: 1 TABLET ORAL at 02:08

## 2017-05-23 RX ADMIN — STANDARDIZED SENNA CONCENTRATE AND DOCUSATE SODIUM 2 TABLET: 8.6; 5 TABLET, FILM COATED ORAL at 19:37

## 2017-05-23 RX ADMIN — ZOLPIDEM TARTRATE 10 MG: 5 TABLET, FILM COATED ORAL at 22:29

## 2017-05-23 RX ADMIN — SULFASALAZINE 1000 MG: 500 TABLET ORAL at 09:09

## 2017-05-23 RX ADMIN — MORPHINE SULFATE 15 MG: 15 TABLET ORAL at 09:04

## 2017-05-23 RX ADMIN — STANDARDIZED SENNA CONCENTRATE AND DOCUSATE SODIUM 2 TABLET: 8.6; 5 TABLET, FILM COATED ORAL at 09:04

## 2017-05-23 ASSESSMENT — ENCOUNTER SYMPTOMS
BACK PAIN: 0
DIZZINESS: 0
CHILLS: 0
SPUTUM PRODUCTION: 0
VOMITING: 0
MYALGIAS: 1
SHORTNESS OF BREATH: 0
NAUSEA: 0
PHOTOPHOBIA: 0
DOUBLE VISION: 0
ABDOMINAL PAIN: 0
COUGH: 0
HEADACHES: 1
FEVER: 0
PALPITATIONS: 0

## 2017-05-23 ASSESSMENT — PAIN SCALES - GENERAL
PAINLEVEL_OUTOF10: 8
PAINLEVEL_OUTOF10: 9
PAINLEVEL_OUTOF10: 7
PAINLEVEL_OUTOF10: 8
PAINLEVEL_OUTOF10: 9

## 2017-05-23 NOTE — PROGRESS NOTES
Hospital Medicine Progress Note, Adult, Complex               Author: Kolton Russo Date & Time created: 5/22/2017  6:49 PM     Interval History:  45 yo female presents with history of migraine and possible history of pseudotumor cerebri presented to hospital with complaint of intractable headache and generalized body ache.    5/22/17:   Seen and examined the patient in early afternoon .  The patient states that her headache still there.  It radiates from the back of her head to the front and she also has pain in arms and legs.    Review of Systems:  Review of Systems   Constitutional: Negative for fever.   Eyes: Positive for photophobia.   Respiratory: Negative for shortness of breath.    Cardiovascular: Negative for chest pain.   Gastrointestinal: Negative for nausea, vomiting and abdominal pain.   Neurological: Positive for headaches.       Physical Exam:  Physical Exam   Constitutional: She is oriented to person, place, and time. No distress.   HENT:   Head: Normocephalic and atraumatic.   Mouth/Throat: Oropharynx is clear and moist. No oropharyngeal exudate.   Eyes: Pupils are equal, round, and reactive to light. Right eye exhibits no discharge. Left eye exhibits no discharge.   Neck: Neck supple. No JVD present.   Cardiovascular: Normal rate and regular rhythm.    Pulmonary/Chest: Effort normal and breath sounds normal.   Abdominal: Soft. Bowel sounds are normal. There is no tenderness.   Musculoskeletal:   Muscle ache on palpation of both upper and lower extremities but without erythema    Neurological: She is alert and oriented to person, place, and time. No cranial nerve deficit.   Skin: Skin is warm and dry. She is not diaphoretic.   Psychiatric:   Anxious       Labs:        Invalid input(s): [id number]  Recent Labs      05/21/17 0207   CPKTOTAL  40     Recent Labs      05/20/17 2045 05/21/17 0207 05/22/17   0331   SODIUM  135   --   141   POTASSIUM  3.0*   --   3.8   CHLORIDE  104   --   114*    CO2  19*   --   19*   BUN  17   --   9   CREATININE  0.75   --   0.55   MAGNESIUM   --   1.8   --    CALCIUM  9.6   --   8.8     Recent Labs      17   0331   ALTSGPT  775*  574*   ASTSGOT  676*  239*   ALKPHOSPHAT  206*  174*   TBILIRUBIN  0.5  0.3   GLUCOSE  86  90     Recent Labs      17   0509   RBC  4.57  3.54*   HEMOGLOBIN  14.9  11.6*   HEMATOCRIT  43.6  35.8*   PLATELETCT  330  260     Recent Labs      17   0331  17   0509   WBC  14.0*   --   5.7   NEUTSPOLYS  88.40*   --   57.00   LYMPHOCYTES  5.80*   --   30.60   MONOCYTES  5.20   --   11.40   EOSINOPHILS  0.00   --   0.00   BASOPHILS  0.20   --   0.50   ASTSGOT  676*  239*   --    ALTSGPT  775*  574*   --    ALKPHOSPHAT  206*  174*   --    TBILIRUBIN  0.5  0.3   --            Hemodynamics:  Temp (24hrs), Av.2 °C (97.1 °F), Min:35.8 °C (96.5 °F), Max:36.6 °C (97.8 °F)  Temperature: 35.8 °C (96.5 °F)  Pulse  Av.5  Min: 78  Max: 150  Blood Pressure: 109/63 mmHg     Respiratory:    Respiration: 16, Pulse Oximetry: 98 %           Fluids:    Intake/Output Summary (Last 24 hours) at 17 1849  Last data filed at 17 1800   Gross per 24 hour   Intake   3700 ml   Output      0 ml   Net   3700 ml        GI/Nutrition:  Orders Placed This Encounter   Procedures   • Diet Order     Standing Status: Standing      Number of Occurrences: 1      Standing Expiration Date:      Order Specific Question:  Diet:     Answer:  Regular [1]     Medical Decision Making, by Problem:  Active Hospital Problems    Diagnosis   • *Status migrainosus [G43.901]  -- discussed the patient's case with the neurologist.  Treat the patient with Toradol, benadryl, and steroid for presume migraine.  However, the patient did report recent history of elevated opening pressure from a LP done in [facility].   Thus, the neurologist recommends a repeat lumbar puncture to check opening  pressure for both diagnostic and therapeutic purpose.  -- the patient is agreeable with repeat lumbar puncture.   • Chronic pain [G89.29]  -- the patient states that she has been doubling up on her oxycodone at home but it is not helping.  Thus, I will switching the patient's prn opioid medication from oxycodone to morphine as per the patient's request.   • Anxiety [F41.9] and depression:   -- continue cymbalta.   • Whole body pain [R52]  -- ESR within normal range and cpk within normal range.  Maybe secondary to fibromyalgia.   • Rheumatoid arthritis (CMS-[facility]) [M06.9]  -- called and spoke to the on-call rheumatologist regarding the patient's case.   On-call rheumatologist recommend hold methotrexate if LFT remains elevated.   The patient's primary rheumatologist, Dr. Andrade, is on vacation and will return on Wednesday.   • Nasal congestion [R09.81]  -- patient reports septum abnormality and uses nasal saline spray. Order ocean nasal spray.   • Diarrhea [R19.7]  -- c. Diff check ordered. Leukocytosis resolved without giving any antibiotic.   • LFT elevation [R94.5]  -- maybe secondary to methotrexate.  Monitor.  Consider holding methotrexate.               DVT Prophylaxis: Enoxaparin (Lovenox)

## 2017-05-23 NOTE — PROGRESS NOTES
Patient tearful and states that the 25 mg IV benadryl and PO fiorcet did not help.  IV toradol was also administered.  Pt feels no one is addressing the fact that it feels like her body is on fire.  Camelia JULIEN notiifed and orders recevied.  Camelia JULIEN stated to give another 25 mg of IV benadryl now.

## 2017-05-23 NOTE — PROGRESS NOTES
Patient states she had one of her episodes when she feels like there is an electrical impulse firing in her head.  She said during the episode she was sitting up in bed coloring in her coloring book and it took her by surprise and it threw her forward into the tray table.  Patient was assessed to have a small scratch on her forehead, no neurological changes from making contact with the bedside tray table.

## 2017-05-23 NOTE — PROGRESS NOTES
Patient reports she has sensations of electrical impulses in her arms and it feels like people are tugging at them.  Patient tearful and states she sees people that are not really there but it feels very real.  Patient is attempting to distract herself by playing solitaire on the ipad and eating snacks.

## 2017-05-23 NOTE — PROGRESS NOTES
Hospital Medicine Progress Note, Adult, Complex               Author: Latasha Johnson Date & Time created: 5/23/2017  12:50 PM     Interval History:  Hospital Course:  Enedelia Pang is a 46 y.o. female w/ h/o pseudotumor cerebri, CVA, prior  shunt with infection thus removal admitted 5/20/2017 for migraine headache.     Today, the patient complains of joint pain. Wishes to have demerol, we discussed I do not believe this is a safe indication to treat her chronic pain with demerol. She has had good pain control with oxy in the past, will resume home oxy. She has had visual hallucinations last night- we have discussed this may be 2/2 morphine which has been stopped. Headache continued, slightly better than before. Complains of joint pain throughout her body, worse in her hands, under workup for autoimmune conditions.     HA not yet improved with LP, states it normally takes many hours to feel relief. She denies visual changes.     Had [place]  [place] Dr Oh did a revision on it, but it got infected and was taken out.     Review of Systems:  Review of Systems   Constitutional: Negative for fever and chills.   Eyes: Negative for double vision and photophobia.   Respiratory: Negative for cough, sputum production and shortness of breath.    Cardiovascular: Negative for chest pain and palpitations.   Gastrointestinal: Negative for nausea, vomiting and abdominal pain.   Genitourinary: Negative for dysuria.   Musculoskeletal: Positive for myalgias and joint pain (throughout). Negative for back pain.   Neurological: Positive for headaches (bilateral frontal headache). Negative for dizziness.   All other systems reviewed and are negative.      Physical Exam:  Physical Exam   Constitutional: She is oriented to person, place, and time. She appears well-developed and well-nourished. No distress.   HENT:   Head: Normocephalic and atraumatic.   Eyes: Conjunctivae are normal.   Neck: Normal range of  motion. Neck supple.   Cardiovascular: Normal rate and regular rhythm.    No murmur heard.  Pulmonary/Chest: Effort normal and breath sounds normal. No respiratory distress.   Abdominal: Soft. Bowel sounds are normal. There is no tenderness.   Musculoskeletal: Normal range of motion. She exhibits no edema.   Strength 3/5 throughout   Neurological: She is alert and oriented to person, place, and time.   Skin: Skin is warm and dry. No rash noted.   Vitals reviewed.      Labs:        Invalid input(s): [id number]  Recent Labs      17   CPKTOTAL  40     Recent Labs      17   0234   SODIUM  135   --   141  137   POTASSIUM  3.0*   --   3.8  3.7   CHLORIDE  104   --   114*  110   CO2  19*   --   19*  17*   BUN  17   --   9  8   CREATININE  0.75   --   0.55  0.51   MAGNESIUM   --   1.8   --    --    CALCIUM  9.6   --   8.8  9.3     Recent Labs      17   0234   ALTSGPT  775*  574*  431*   ASTSGOT  676*  239*  91*   ALKPHOSPHAT  206*  174*  169*   TBILIRUBIN  0.5  0.3  0.5   GLUCOSE  86  90  111*     Recent Labs      17   05017   0234   RBC  4.57  3.54*  3.97*   HEMOGLOBIN  14.9  11.6*  12.7   HEMATOCRIT  43.6  35.8*  39.3   PLATELETCT  330  260  373     Recent Labs      17   05017   0234   WBC  14.0*   --   5.7  7.7   NEUTSPOLYS  88.40*   --   57.00  82.30*   LYMPHOCYTES  5.80*   --   30.60  13.20*   MONOCYTES  5.20   --   11.40  3.50   EOSINOPHILS  0.00   --   0.00  0.00   BASOPHILS  0.20   --   0.50  0.40   ASTSGOT  676*  239*   --   91*   ALTSGPT  775*  574*   --   431*   ALKPHOSPHAT  206*  174*   --   169*   TBILIRUBIN  0.5  0.3   --   0.5           Hemodynamics:  Temp (24hrs), Av.6 °C (97.8 °F), Min:36.3 °C (97.3 °F), Max:36.8 °C (98.2 °F)  Temperature: 36.8 °C (98.2 °F)  Pulse  Av.8  Min: 78  Max: 150  Blood Pressure:  107/60 mmHg     Respiratory:    Respiration: 17, Pulse Oximetry: 100 %           Fluids:    Intake/Output Summary (Last 24 hours) at 05/23/17 1250  Last data filed at 05/23/17 0200   Gross per 24 hour   Intake   2090 ml   Output      0 ml   Net   2090 ml        GI/Nutrition:  Orders Placed This Encounter   Procedures   • Diet Order     Standing Status: Standing      Number of Occurrences: 1      Standing Expiration Date:      Order Specific Question:  Diet:     Answer:  Regular [1]     Medical Decision Making, by Problem:  Active Hospital Problems    Diagnosis   • *Status migrainosus [G43.901]  -Cont PRN benadryl, fioricet, toradol  -LP today- opening pressure 36, glucose 67, protein 38, WBC 4  -CT brain- neg  -D/W Dr Oh- he has recommended that she see him OP, she had a LP shunt in the past that was marginally effective thus it was removed. Per NS no need for emergent procedure, may be f/u as OP in his office  -Appreciate neurology consult, Dr Ibarra   • Whole body pain [R52]- undergoing workup for autoimmune condition OP  -Cont home gabapentin, oxycodone   • Nasal congestion [R09.81]- saline rinse PRN   • Diarrhea [R19.7]- resolevd   • LFT elevation [R94.5]  -US RUQ neg  -Trend- improved today  -If worsened stop methotrexate per rheumatology   • Anxiety [F41.9]   • Rheumatoid arthritis (CMS-HCC) [M06.9]- OP workup   • Chronic pain [G89.29]     BC positive 1/2 bottles- coag neg staph  -Suspect contaminant, repeat BC pending    Dispo: home once improved    Labs reviewed and Medications reviewed  Lopez catheter: No Lopez      DVT Prophylaxis: Enoxaparin (Lovenox)    Ulcer prophylaxis: Not indicated    Assessed for rehab: Patient was assess for and/or received rehabilitation services during this hospitalization

## 2017-05-23 NOTE — PROGRESS NOTES
NEUROLOGY PROGRESS NOTE      Background:  46 y.o. female was admitted on 5/20/2017  7:09 PM for Status migrainosus. She is being followed by Neurology for Status migrainosus and   idiopathic increased intracranial pressure.      SUBJECTIVE:   Patient reports she has sensations of electrical impulses that start from back of her head with radiation to her arms. Also she sees people that are not really there but it feels very real. She talk to her  that is not physically there. She is S/P spinal tap.            NEUROLOGICAL EXAM:   She is awake, alert, fully oriented.  Speech and memory within    normal limit.  Facial motor and sensation intact.  Extraocular movements are    full.  Visual fields are full to confrontation, although she appears to have    bitemporal visual blurriness.  Funduscopic exam attempted, but difficult to    perform due to the small size of her pupil.  Hearing is intact to finger rub    bilaterally.  Tongue midline and protrudes symmetrically.  Palate elevates    symmetrically.  Shoulder shrugs are normal.  Motor examination revealed normal   strength to direct testing of both upper and lower extremity, proximal and    distal.  Sensation intact to light touch, temperature, and pinprick.     Coordination intact finger-to-nose and heel-to-shin testing.  Deep tendon    reflexes are 1+ and equal.  Plantar reflexes are downgoing.    OBJECTIVE:       MEDICATIONS:  Current Facility-Administered Medications   Medication Dose   • oxycodone immediate release (ROXICODONE) tablet 10 mg  10 mg   • magnesium sulfate IVPB premix 2 g  2 g   • methylPREDNISolone sod succ (SOLU-MEDROL) 125 MG injection 125 mg  125 mg   • sodium chloride (OCEAN) 0.65 % nasal spray 2 Spray  2 Spray   • acetaminophen/caffeine/butalbital 325-40-50 mg (FIORICET) -40 MG per tablet 1 Tab  1 Tab   • diphenhydrAMINE (BENADRYL) injection 50 mg  50 mg   • acetaZOLAMIDE SR (DIAMOX) capsule 500 mg  500 mg   • calcium carbonate  "(OS-ZAIN 500) tablet 500 mg  500 mg   • duloxetine (CYMBALTA) capsule 60 mg  60 mg   • ferrous gluconate (FERGON) tablet 324 mg  324 mg   • folic acid (FOLVITE) tablet 1 mg  1 mg   • gabapentin (NEURONTIN) capsule 600 mg  600 mg   • [START ON 5/25/2017] methotrexate tablet 20 mg  20 mg   • predniSONE (DELTASONE) tablet 10 mg  10 mg   • risperidone (RISPERDAL) tablet 0.5 mg  0.5 mg   • sulfaSALAzine (AZULFIDINE) tablet 1,000 mg  1,000 mg   • senna-docusate (PERICOLACE or SENOKOT S) 8.6-50 MG per tablet 2 Tab  2 Tab    And   • polyethylene glycol/lytes (MIRALAX) PACKET 1 Packet  1 Packet    And   • magnesium hydroxide (MILK OF MAGNESIA) suspension 30 mL  30 mL    And   • bisacodyl (DULCOLAX) suppository 10 mg  10 mg   • 0.9 % NaCl with KCl 20 mEq infusion     • enoxaparin (LOVENOX) inj 40 mg  40 mg   • ondansetron (ZOFRAN) syringe/vial injection 4 mg  4 mg   • ondansetron (ZOFRAN ODT) dispertab 4 mg  4 mg   • lorazepam (ATIVAN) tablet 0.5 mg  0.5 mg    Or   • lorazepam (ATIVAN) injection 0.5 mg  0.5 mg   • aspirin (ASA) chewable tab 81 mg  81 mg   • promethazine (PHENERGAN) tablet 25 mg  25 mg   • ketorolac (TORADOL) injection  30 mg       Blood pressure 107/60, pulse 98, temperature 36.8 °C (98.2 °F), resp. rate 17, height 1.6 m (5' 3\"), weight 71.9 kg (158 lb 8.2 oz), last menstrual period 05/09/2017, SpO2 100 %, not currently breastfeeding.    Recent Labs      05/21/17   0207   CPKTOTAL  40     Recent Labs      05/20/17   2045  05/22/17   0509  05/23/17   0234   WBC  14.0*  5.7  7.7   RBC  4.57  3.54*  3.97*   HEMOGLOBIN  14.9  11.6*  12.7   HEMATOCRIT  43.6  35.8*  39.3   MCV  95.4  101.1*  99.0*   MCH  32.6  32.8  32.0   MCHC  34.2  32.4*  32.3*   RDW  45.9  51.7*  49.7   PLATELETCT  330  260  373   MPV  9.3  9.7  9.7     Recent Labs      05/20/17   2045  05/21/17   0207  05/22/17   0331  05/23/17   0234   SODIUM  135   --   141  137   POTASSIUM  3.0*   --   3.8  3.7   CHLORIDE  104   --   114*  110   CO2  19*   " --   19*  17*   GLUCOSE  86   --   90  111*   BUN  17   --   9  8   CPKTOTAL   --   40   --    --        Results for orders placed or performed during the hospital encounter of 10/29/14   2-D ECHO W/DOPPLER (ECHOCARDIOGRAM COMP W/O CONT)   Result Value Ref Range    Eject.Frac. MOD BP 72.63     Eject.Frac. MOD 4C 71.9     Eject.Frac. MOD 2C 69.76         ASSESSMENT AND PLAN:   A 46-year-old female with status migrainosus.  Her    headache has been refractory to most preventive and abortive treatment, though   she has responded to Toradol and diphenhydramine.  She may have underlying    idiopathic increased intracranial pressure.  She had an LP 3 weeks ago with    opening pressure of 38 cm of water. Her LP this morning revealed opening pressure of   36 cm of water. 21 mL of CSF was removed and now she feels better.  She will con't with  Toradol 30 mg IV every 8 hours and Benadryl 50 mg every 8 hours, also    Phenergan 12.5 mg q. 6 hours p.r.n. nausea.   Will start Seroquel for her visual Hallucination as needed.  Consider neurosurgical evaluation for shunt placement.

## 2017-05-23 NOTE — OR SURGEON
EXAMINATION:  5/23/2017 9:37 AM    HISTORY/REASON FOR EXAM:  Intractable headache and pseudotumor cerebra I.         TECHNIQUE/EXAM DESCRIPTION AND NUMBER OF VIEWS:  Fluoroscopic-guided lumbar puncture.    1 fluoroscopic images obtained.    Fluoroscopy time: 0.22 minutes    PROCEDURE:     Informed consent was obtained. A timeout was taken. With the patient in prone position, the lumbar region was prepped and draped in a sterile manner. Following local anesthesia with 1% lidocaine, a 20-gauge spinal needle was advanced into the subarachnoid space at the L 4 level.  Approximately 21 cc of CSF was collected and the specimens sent to the lab for the studies requested. The patient tolerated the procedure well with no evidence of complication. Opening pressure was measured at 360 mm H2O. No blood loss. 21 mL specimen.    IMPRESSION:  Fluoroscopic-guided lumbar puncture as described above.

## 2017-05-24 ENCOUNTER — PATIENT OUTREACH (OUTPATIENT)
Dept: HEALTH INFORMATION MANAGEMENT | Facility: OTHER | Age: 47
End: 2017-05-24

## 2017-05-24 LAB
ALBUMIN SERPL BCP-MCNC: 4 G/DL (ref 3.2–4.9)
ALBUMIN/GLOB SERPL: 1.4 G/DL
ALP SERPL-CCNC: 144 U/L (ref 30–99)
ALT SERPL-CCNC: 291 U/L (ref 2–50)
ANION GAP SERPL CALC-SCNC: 9 MMOL/L (ref 0–11.9)
AST SERPL-CCNC: 44 U/L (ref 12–45)
BILIRUB SERPL-MCNC: 0.3 MG/DL (ref 0.1–1.5)
BUN SERPL-MCNC: 9 MG/DL (ref 8–22)
CALCIUM SERPL-MCNC: 9.4 MG/DL (ref 8.5–10.5)
CHLORIDE SERPL-SCNC: 109 MMOL/L (ref 96–112)
CO2 SERPL-SCNC: 22 MMOL/L (ref 20–33)
CREAT SERPL-MCNC: 0.59 MG/DL (ref 0.5–1.4)
ERYTHROCYTE [DISTWIDTH] IN BLOOD BY AUTOMATED COUNT: 51.1 FL (ref 35.9–50)
GFR SERPL CREATININE-BSD FRML MDRD: >60 ML/MIN/1.73 M 2
GLOBULIN SER CALC-MCNC: 2.8 G/DL (ref 1.9–3.5)
GLUCOSE SERPL-MCNC: 100 MG/DL (ref 65–99)
HCT VFR BLD AUTO: 38 % (ref 37–47)
HGB BLD-MCNC: 12.1 G/DL (ref 12–16)
IGG CSF-MCNC: 1.8 MG/DL (ref 0–6)
MCH RBC QN AUTO: 32.3 PG (ref 27–33)
MCHC RBC AUTO-ENTMCNC: 31.8 G/DL (ref 33.6–35)
MCV RBC AUTO: 101.3 FL (ref 81.4–97.8)
PLATELET # BLD AUTO: 383 K/UL (ref 164–446)
PMV BLD AUTO: 9.7 FL (ref 9–12.9)
POTASSIUM SERPL-SCNC: 3.7 MMOL/L (ref 3.6–5.5)
PROT SERPL-MCNC: 6.8 G/DL (ref 6–8.2)
RBC # BLD AUTO: 3.75 M/UL (ref 4.2–5.4)
SODIUM SERPL-SCNC: 140 MMOL/L (ref 135–145)
WBC # BLD AUTO: 6.3 K/UL (ref 4.8–10.8)

## 2017-05-24 PROCEDURE — 99232 SBSQ HOSP IP/OBS MODERATE 35: CPT | Performed by: HOSPITALIST

## 2017-05-24 PROCEDURE — A9270 NON-COVERED ITEM OR SERVICE: HCPCS | Performed by: HOSPITALIST

## 2017-05-24 PROCEDURE — 700102 HCHG RX REV CODE 250 W/ 637 OVERRIDE(OP): Performed by: PSYCHIATRY & NEUROLOGY

## 2017-05-24 PROCEDURE — A9270 NON-COVERED ITEM OR SERVICE: HCPCS | Performed by: PSYCHIATRY & NEUROLOGY

## 2017-05-24 PROCEDURE — A9270 NON-COVERED ITEM OR SERVICE: HCPCS | Performed by: NURSE PRACTITIONER

## 2017-05-24 PROCEDURE — 770001 HCHG ROOM/CARE - MED/SURG/GYN PRIV*

## 2017-05-24 PROCEDURE — 36415 COLL VENOUS BLD VENIPUNCTURE: CPT

## 2017-05-24 PROCEDURE — 85027 COMPLETE CBC AUTOMATED: CPT

## 2017-05-24 PROCEDURE — 700101 HCHG RX REV CODE 250: Performed by: HOSPITALIST

## 2017-05-24 PROCEDURE — 700102 HCHG RX REV CODE 250 W/ 637 OVERRIDE(OP): Performed by: NURSE PRACTITIONER

## 2017-05-24 PROCEDURE — 700111 HCHG RX REV CODE 636 W/ 250 OVERRIDE (IP): Performed by: NURSE PRACTITIONER

## 2017-05-24 PROCEDURE — 700102 HCHG RX REV CODE 250 W/ 637 OVERRIDE(OP): Performed by: HOSPITALIST

## 2017-05-24 PROCEDURE — 700111 HCHG RX REV CODE 636 W/ 250 OVERRIDE (IP): Performed by: PSYCHIATRY & NEUROLOGY

## 2017-05-24 PROCEDURE — 700111 HCHG RX REV CODE 636 W/ 250 OVERRIDE (IP): Performed by: HOSPITALIST

## 2017-05-24 PROCEDURE — 80053 COMPREHEN METABOLIC PANEL: CPT

## 2017-05-24 PROCEDURE — 700111 HCHG RX REV CODE 636 W/ 250 OVERRIDE (IP): Performed by: INTERNAL MEDICINE

## 2017-05-24 RX ORDER — MEPERIDINE HYDROCHLORIDE 25 MG/ML
25 INJECTION INTRAMUSCULAR; INTRAVENOUS; SUBCUTANEOUS EVERY 6 HOURS
Status: DISCONTINUED | OUTPATIENT
Start: 2017-05-24 | End: 2017-05-24

## 2017-05-24 RX ORDER — MEPERIDINE HYDROCHLORIDE 25 MG/ML
25 INJECTION INTRAMUSCULAR; INTRAVENOUS; SUBCUTANEOUS EVERY 6 HOURS
Status: COMPLETED | OUTPATIENT
Start: 2017-05-24 | End: 2017-05-24

## 2017-05-24 RX ORDER — MEPERIDINE HYDROCHLORIDE 50 MG/ML
50 INJECTION INTRAMUSCULAR; INTRAVENOUS; SUBCUTANEOUS EVERY 6 HOURS
Status: DISCONTINUED | OUTPATIENT
Start: 2017-05-24 | End: 2017-05-24

## 2017-05-24 RX ORDER — RISPERIDONE 0.5 MG/1
1 TABLET ORAL 2 TIMES DAILY
Status: DISCONTINUED | OUTPATIENT
Start: 2017-05-24 | End: 2017-06-03 | Stop reason: HOSPADM

## 2017-05-24 RX ORDER — METOCLOPRAMIDE HYDROCHLORIDE 5 MG/ML
10 INJECTION INTRAMUSCULAR; INTRAVENOUS EVERY 6 HOURS PRN
Status: DISCONTINUED | OUTPATIENT
Start: 2017-05-24 | End: 2017-05-31

## 2017-05-24 RX ORDER — MEPERIDINE HYDROCHLORIDE 50 MG/ML
50 INJECTION INTRAMUSCULAR; INTRAVENOUS; SUBCUTANEOUS ONCE
Status: COMPLETED | OUTPATIENT
Start: 2017-05-24 | End: 2017-05-24

## 2017-05-24 RX ADMIN — MEPERIDINE HYDROCHLORIDE 25 MG: 25 INJECTION INTRAMUSCULAR; INTRAVENOUS; SUBCUTANEOUS at 15:04

## 2017-05-24 RX ADMIN — LORAZEPAM 0.5 MG: 1 TABLET ORAL at 21:58

## 2017-05-24 RX ADMIN — PREDNISONE 10 MG: 10 TABLET ORAL at 09:10

## 2017-05-24 RX ADMIN — STANDARDIZED SENNA CONCENTRATE AND DOCUSATE SODIUM 2 TABLET: 8.6; 5 TABLET, FILM COATED ORAL at 09:10

## 2017-05-24 RX ADMIN — MEPERIDINE HYDROCHLORIDE 25 MG: 25 INJECTION INTRAMUSCULAR; INTRAVENOUS; SUBCUTANEOUS at 21:58

## 2017-05-24 RX ADMIN — RISPERIDONE 1 MG: 0.5 TABLET, FILM COATED ORAL at 11:44

## 2017-05-24 RX ADMIN — FERROUS GLUCONATE 324 MG: 324 TABLET ORAL at 17:35

## 2017-05-24 RX ADMIN — DIPHENHYDRAMINE HYDROCHLORIDE 50 MG: 50 INJECTION, SOLUTION INTRAMUSCULAR; INTRAVENOUS at 01:15

## 2017-05-24 RX ADMIN — OXYCODONE HYDROCHLORIDE 10 MG: 10 TABLET ORAL at 20:10

## 2017-05-24 RX ADMIN — DULOXETINE HYDROCHLORIDE 60 MG: 60 CAPSULE, DELAYED RELEASE ORAL at 09:10

## 2017-05-24 RX ADMIN — POTASSIUM CHLORIDE AND SODIUM CHLORIDE: 900; 150 INJECTION, SOLUTION INTRAVENOUS at 17:34

## 2017-05-24 RX ADMIN — KETOROLAC TROMETHAMINE 30 MG: 30 INJECTION, SOLUTION INTRAMUSCULAR at 09:12

## 2017-05-24 RX ADMIN — MAGNESIUM SULFATE IN WATER 2 G: 40 INJECTION, SOLUTION INTRAVENOUS at 09:11

## 2017-05-24 RX ADMIN — ACETAZOLAMIDE 500 MG: 500 CAPSULE, EXTENDED RELEASE ORAL at 20:07

## 2017-05-24 RX ADMIN — GABAPENTIN 600 MG: 300 CAPSULE ORAL at 15:03

## 2017-05-24 RX ADMIN — ENOXAPARIN SODIUM 40 MG: 100 INJECTION SUBCUTANEOUS at 09:11

## 2017-05-24 RX ADMIN — LORAZEPAM 0.5 MG: 1 TABLET ORAL at 03:59

## 2017-05-24 RX ADMIN — POTASSIUM CHLORIDE AND SODIUM CHLORIDE: 900; 150 INJECTION, SOLUTION INTRAVENOUS at 04:26

## 2017-05-24 RX ADMIN — KETOROLAC TROMETHAMINE 30 MG: 30 INJECTION, SOLUTION INTRAMUSCULAR at 17:33

## 2017-05-24 RX ADMIN — QUETIAPINE FUMARATE 25 MG: 25 TABLET ORAL at 09:08

## 2017-05-24 RX ADMIN — GABAPENTIN 600 MG: 300 CAPSULE ORAL at 20:07

## 2017-05-24 RX ADMIN — RISPERIDONE 0.5 MG: 0.5 TABLET, FILM COATED ORAL at 09:11

## 2017-05-24 RX ADMIN — ASPIRIN 81 MG: 81 TABLET, CHEWABLE ORAL at 09:10

## 2017-05-24 RX ADMIN — DIPHENHYDRAMINE HYDROCHLORIDE 50 MG: 50 INJECTION, SOLUTION INTRAMUSCULAR; INTRAVENOUS at 17:33

## 2017-05-24 RX ADMIN — GABAPENTIN 600 MG: 300 CAPSULE ORAL at 09:10

## 2017-05-24 RX ADMIN — KETOROLAC TROMETHAMINE 30 MG: 30 INJECTION, SOLUTION INTRAMUSCULAR at 01:15

## 2017-05-24 RX ADMIN — LORAZEPAM 0.5 MG: 2 INJECTION INTRAMUSCULAR at 17:34

## 2017-05-24 RX ADMIN — SULFASALAZINE 1000 MG: 500 TABLET ORAL at 20:08

## 2017-05-24 RX ADMIN — ACETAZOLAMIDE 500 MG: 500 CAPSULE, EXTENDED RELEASE ORAL at 09:09

## 2017-05-24 RX ADMIN — DIPHENHYDRAMINE HYDROCHLORIDE 50 MG: 50 INJECTION, SOLUTION INTRAMUSCULAR; INTRAVENOUS at 09:12

## 2017-05-24 RX ADMIN — DULOXETINE HYDROCHLORIDE 60 MG: 60 CAPSULE, DELAYED RELEASE ORAL at 20:08

## 2017-05-24 RX ADMIN — FOLIC ACID 1 MG: 1 TABLET ORAL at 09:10

## 2017-05-24 RX ADMIN — Medication 500 MG: at 17:35

## 2017-05-24 RX ADMIN — RISPERIDONE 1 MG: 0.5 TABLET, FILM COATED ORAL at 20:07

## 2017-05-24 RX ADMIN — MEPERIDINE HYDROCHLORIDE 50 MG: 50 INJECTION INTRAMUSCULAR; INTRAVENOUS; SUBCUTANEOUS at 09:12

## 2017-05-24 RX ADMIN — OXYCODONE HYDROCHLORIDE 10 MG: 10 TABLET ORAL at 03:59

## 2017-05-24 RX ADMIN — OXYCODONE HYDROCHLORIDE 10 MG: 10 TABLET ORAL at 11:45

## 2017-05-24 RX ADMIN — Medication 500 MG: at 09:09

## 2017-05-24 RX ADMIN — FERROUS GLUCONATE 324 MG: 324 TABLET ORAL at 09:10

## 2017-05-24 RX ADMIN — SULFASALAZINE 1000 MG: 500 TABLET ORAL at 09:11

## 2017-05-24 ASSESSMENT — ENCOUNTER SYMPTOMS
CONSTIPATION: 0
FEVER: 0
HEADACHES: 1
PHOTOPHOBIA: 0
CHILLS: 0
ABDOMINAL PAIN: 0
MYALGIAS: 0
DIARRHEA: 1
DOUBLE VISION: 0
VOMITING: 0
SPUTUM PRODUCTION: 0
SHORTNESS OF BREATH: 0
NAUSEA: 1
COUGH: 0
PALPITATIONS: 0
BACK PAIN: 0
DIZZINESS: 0

## 2017-05-24 ASSESSMENT — PAIN SCALES - GENERAL
PAINLEVEL_OUTOF10: 6
PAINLEVEL_OUTOF10: 0
PAINLEVEL_OUTOF10: 7
PAINLEVEL_OUTOF10: 0
PAINLEVEL_OUTOF10: 8
PAINLEVEL_OUTOF10: 7
PAINLEVEL_OUTOF10: 7
PAINLEVEL_OUTOF10: 8
PAINLEVEL_OUTOF10: 10
PAINLEVEL_OUTOF10: 8

## 2017-05-24 NOTE — PROGRESS NOTES
Pt aaox4. Pt c/o HA 10/10. Demerol and Toradol/benadryl. Up w/ SBA. Voiding w/o difficulty. Reviewed poc with pt-verbalized understanding. {hysical therapy and Phych eval ordered. Unable to do MRI d/t stimulator- APN aware. Call light in reach. Family at bedside.

## 2017-05-24 NOTE — PROGRESS NOTES
Pt c/o pain and not being able to sleep-  at bedside stating pt has not slept in a couple days. If she could just get something to sleep she will be better.  md called will give ambien

## 2017-05-24 NOTE — PROGRESS NOTES
NEUROLOGY PROGRESS NOTE      Background:  46 y.o. female was admitted on 5/20/2017  7:09 PM for Status migrainosus. She is being followed by Neurology for Status migrainosus and   idiopathic increased intracranial pressure.      SUBJECTIVE:   Her headache has improved after the LP. Hallucinations are improved but not resolved.           NEUROLOGICAL EXAM:   She is awake, alert, fully oriented.  Speech and memory within    normal limit.  Facial motor and sensation intact.  Extraocular movements are    full.  Visual fields are full to confrontation, although she appears to have    bitemporal visual blurriness.  Funduscopic exam attempted, but difficult to    perform due to the small size of her pupil.  Hearing is intact to finger rub    bilaterally.  Tongue midline and protrudes symmetrically.  Palate elevates    symmetrically.  Shoulder shrugs are normal.  Motor examination revealed normal   strength to direct testing of both upper and lower extremity, proximal and    distal.  Sensation intact to light touch, temperature, and pinprick.     Coordination intact finger-to-nose and heel-to-shin testing.  Deep tendon    reflexes are 1+ and equal.  Plantar reflexes are downgoing.    OBJECTIVE:       MEDICATIONS:  Current Facility-Administered Medications   Medication Dose   • metoclopramide (REGLAN) injection 10 mg  10 mg   • risperidone (RISPERDAL) tablet 1 mg  1 mg   • meperidine (DEMEROL) injection 50 mg  50 mg   • oxycodone immediate release (ROXICODONE) tablet 10 mg  10 mg   • zolpidem (AMBIEN) tablet 10 mg  10 mg   • magnesium sulfate IVPB premix 2 g  2 g   • sodium chloride (OCEAN) 0.65 % nasal spray 2 Spray  2 Spray   • acetaminophen/caffeine/butalbital 325-40-50 mg (FIORICET) -40 MG per tablet 1 Tab  1 Tab   • diphenhydrAMINE (BENADRYL) injection 50 mg  50 mg   • acetaZOLAMIDE SR (DIAMOX) capsule 500 mg  500 mg   • calcium carbonate (OS-ZAIN 500) tablet 500 mg  500 mg   • duloxetine (CYMBALTA) capsule 60 mg   "60 mg   • ferrous gluconate (FERGON) tablet 324 mg  324 mg   • folic acid (FOLVITE) tablet 1 mg  1 mg   • gabapentin (NEURONTIN) capsule 600 mg  600 mg   • [START ON 5/25/2017] methotrexate tablet 20 mg  20 mg   • predniSONE (DELTASONE) tablet 10 mg  10 mg   • sulfaSALAzine (AZULFIDINE) tablet 1,000 mg  1,000 mg   • senna-docusate (PERICOLACE or SENOKOT S) 8.6-50 MG per tablet 2 Tab  2 Tab    And   • polyethylene glycol/lytes (MIRALAX) PACKET 1 Packet  1 Packet    And   • magnesium hydroxide (MILK OF MAGNESIA) suspension 30 mL  30 mL    And   • bisacodyl (DULCOLAX) suppository 10 mg  10 mg   • 0.9 % NaCl with KCl 20 mEq infusion     • enoxaparin (LOVENOX) inj 40 mg  40 mg   • lorazepam (ATIVAN) tablet 0.5 mg  0.5 mg    Or   • lorazepam (ATIVAN) injection 0.5 mg  0.5 mg   • aspirin (ASA) chewable tab 81 mg  81 mg   • promethazine (PHENERGAN) tablet 25 mg  25 mg   • ketorolac (TORADOL) injection  30 mg       Blood pressure 130/83, pulse 82, temperature 36.7 °C (98 °F), resp. rate 18, height 1.6 m (5' 3\"), weight 71.9 kg (158 lb 8.2 oz), last menstrual period 05/09/2017, SpO2 99 %, not currently breastfeeding.        Recent Labs      05/22/17   0509  05/23/17   0234  05/24/17   0308   WBC  5.7  7.7  6.3   RBC  3.54*  3.97*  3.75*   HEMOGLOBIN  11.6*  12.7  12.1   HEMATOCRIT  35.8*  39.3  38.0   MCV  101.1*  99.0*  101.3*   MCH  32.8  32.0  32.3   MCHC  32.4*  32.3*  31.8*   RDW  51.7*  49.7  51.1*   PLATELETCT  260  373  383   MPV  9.7  9.7  9.7     Recent Labs      05/22/17   0331  05/23/17   0234  05/24/17   0308   SODIUM  141  137  140   POTASSIUM  3.8  3.7  3.7   CHLORIDE  114*  110  109   CO2  19*  17*  22   GLUCOSE  90  111*  100*   BUN  9  8  9       Results for orders placed or performed during the hospital encounter of 10/29/14   2-D ECHO W/DOPPLER (ECHOCARDIOGRAM COMP W/O CONT)   Result Value Ref Range    Eject.Frac. MOD BP 72.63     Eject.Frac. MOD 4C 71.9     Eject.Frac. MOD 2C 69.76         ASSESSMENT AND " PLAN:   A 46-year-old female with status migrainosus.  Her    headache has been refractory to most preventive and abortive treatment, though   she has responded to Toradol and diphenhydramine Headache has improved after the LP  She has  idiopathic increased intracranial pressure.   She will con't with  Toradol 30 mg IV every 8 hours and Benadryl 50 mg every 8 hours.    Follow up with neurology, neurosurgery and psychiatry as out patient.  Will sign off, please call me if any questions.

## 2017-05-24 NOTE — PROGRESS NOTES
Information from patient regarding neuro stimulators by [employer] placed in patient chart along with diagram drawn by patient.

## 2017-05-24 NOTE — PROGRESS NOTES
Hospital Medicine Progress Note, Adult, Complex               Author: Latasha Johnson Date & Time created: 5/24/2017  11:12 AM     Interval History:  Hospital Course:  Enedelia Pang is a 46 y.o. female w/ h/o pseudotumor cerebri, CVA, prior  shunt with infection thus removal admitted 5/20/2017 for migraine headache.     Today, the patient complains of joint pain that is severe and causing N/V/Diarrhea. She has had good pain control with demerol. Her headache has improved since the LP puncture. She denies visual changes, focal deficit. Hallucinations are improved but continue. She requests to see a psychiatrist, as she is often overwhelmed by her chronic pain and would like coping resources. Very anxious about joint pain, states her headache is at baseline.     Review of Systems:  Review of Systems   Constitutional: Negative for fever and chills.   Eyes: Negative for double vision and photophobia.   Respiratory: Negative for cough, sputum production and shortness of breath.    Cardiovascular: Negative for chest pain, palpitations and leg swelling.   Gastrointestinal: Positive for nausea and diarrhea. Negative for vomiting, abdominal pain and constipation.   Genitourinary: Negative for dysuria.   Musculoskeletal: Positive for joint pain (throughout, severe uncontrolled). Negative for myalgias and back pain.   Neurological: Positive for headaches (bilateral frontal headache, controlled). Negative for dizziness.   All other systems reviewed and are negative.      Physical Exam:  Physical Exam   Constitutional: She is oriented to person, place, and time. She appears well-developed and well-nourished. No distress.   HENT:   Head: Normocephalic and atraumatic.   Eyes: Conjunctivae are normal. No scleral icterus.   Neck: Normal range of motion. Neck supple.   Cardiovascular: Normal rate and regular rhythm.    No murmur heard.  Pulmonary/Chest: Effort normal and breath sounds normal. No respiratory distress. She has no  wheezes. She has no rales.   Abdominal: Soft. Bowel sounds are normal. There is no tenderness. There is no guarding.   Musculoskeletal: Normal range of motion. She exhibits no edema.   Strength 3/5 throughout   Neurological: She is alert and oriented to person, place, and time.   Skin: Skin is warm and dry. No rash noted.   Vitals reviewed.      Labs:        Invalid input(s): [id number]      Recent Labs      17   0308   SODIUM  141  137  140   POTASSIUM  3.8  3.7  3.7   CHLORIDE  114*  110  109   CO2  19*  17*  22   BUN  9  8  9   CREATININE  0.55  0.51  0.59   MAGNESIUM   --   2.1   --    CALCIUM  8.8  9.3  9.4     Recent Labs      17   0308   ALTSGPT  574*  431*  291*   ASTSGOT  239*  91*  44   ALKPHOSPHAT  174*  169*  144*   TBILIRUBIN  0.3  0.5  0.3   GLUCOSE  90  111*  100*     Recent Labs      17   05017   0308   RBC  3.54*  3.97*  3.75*   HEMOGLOBIN  11.6*  12.7  12.1   HEMATOCRIT  35.8*  39.3  38.0   PLATELETCT  260  373  383     Recent Labs      17   0308   WBC   --   5.7  7.7  6.3   NEUTSPOLYS   --   57.00  82.30*   --    LYMPHOCYTES   --   30.60  13.20*   --    MONOCYTES   --   11.40  3.50   --    EOSINOPHILS   --   0.00  0.00   --    BASOPHILS   --   0.50  0.40   --    ASTSGOT  239*   --   91*  44   ALTSGPT  574*   --   431*  291*   ALKPHOSPHAT  174*   --   169*  144*   TBILIRUBIN  0.3   --   0.5  0.3           Hemodynamics:  Temp (24hrs), Av.6 °C (97.8 °F), Min:36 °C (96.8 °F), Max:36.8 °C (98.2 °F)  Temperature: 36.7 °C (98 °F)  Pulse  Av.5  Min: 78  Max: 150  Blood Pressure: 130/83 mmHg     Respiratory:    Respiration: 18, Pulse Oximetry: 99 %           Fluids:    Intake/Output Summary (Last 24 hours) at 17 1112  Last data filed at 17 2100   Gross per 24 hour   Intake   1220 ml   Output      0 ml   Net   1220  ml        GI/Nutrition:  Orders Placed This Encounter   Procedures   • Diet Order     Standing Status: Standing      Number of Occurrences: 1      Standing Expiration Date:      Order Specific Question:  Diet:     Answer:  Regular [1]     Medical Decision Making, by Problem:  Active Hospital Problems    Diagnosis   • *Status migrainosus [G43.901]- stable to baseline headache status  -Cont diamox, PRN benadryl, fioricet, toradol  -LP today- opening pressure 36, glucose 67, protein 38, WBC 4; improvement of HA noted  -CT brain- neg  -D/W Dr Oh- he has recommended that she see him OP, she had a LP shunt in the past that was marginally effective thus it was removed. Per NS no need for emergent procedure, may be f/u as OP in his office  -Appreciate neurology consult, Dr Ibarra   • Whole body pain [R52]- undergoing workup for autoimmune condition OP  -Cont home gabapentin, oxycodone  -Trial of demerol for pain management   • Nasal congestion [R09.81]- saline rinse PRN   • Diarrhea [R19.7]- resolevd   • LFT elevation [R94.5]- improving, suspect drug related  -US RUQ neg  -Trend  -If worsened stop methotrexate per rheumatology   • Anxiety [F41.9]  -Appreciate psychiatry consult, Dr Puga   • Rheumatoid arthritis (CMS-HCC) [M06.9]- OP workup   • Chronic pain [G89.29]     BC positive 1/2 bottles- coag neg staph  -Suspect contaminant, repeat BC pending    N/V- PRN reglan    Dispo: home once improved    Labs reviewed and Medications reviewed  Lopez catheter: No Lopez      DVT Prophylaxis: Enoxaparin (Lovenox)    Ulcer prophylaxis: Not indicated    Assessed for rehab: Patient was assess for and/or received rehabilitation services during this hospitalization
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
## 2017-05-24 NOTE — CARE PLAN
Problem: Safety  Goal: Will remain free from falls  Pt refused bed alarm despite education. Instructed pt to call for assistance before getting OOB. Pt verbalized understanding. Call light w/in reach.    Problem: Knowledge Deficit  Goal: Knowledge of disease process/condition, treatment plan, diagnostic tests, and medications will improve  Intervention: Explain information regarding disease process/condition, treatment plan, diagnostic tests, and medications and document in education  poc discussed. PT and psych eval pending.

## 2017-05-25 LAB
ALBUMIN SERPL BCP-MCNC: 3.3 G/DL (ref 3.2–4.9)
ALBUMIN/GLOB SERPL: 1.5 G/DL
ALP SERPL-CCNC: 99 U/L (ref 30–99)
ALT SERPL-CCNC: 166 U/L (ref 2–50)
ANION GAP SERPL CALC-SCNC: 7 MMOL/L (ref 0–11.9)
AST SERPL-CCNC: 23 U/L (ref 12–45)
BILIRUB SERPL-MCNC: 0.2 MG/DL (ref 0.1–1.5)
BUN SERPL-MCNC: 10 MG/DL (ref 8–22)
CALCIUM SERPL-MCNC: 8.3 MG/DL (ref 8.5–10.5)
CFT BLD TEG: 4.2 MIN (ref 5–10)
CHLORIDE SERPL-SCNC: 113 MMOL/L (ref 96–112)
CLOT ANGLE BLD TEG: 72.7 DEGREES (ref 53–72)
CLOT LYSIS 30M P MA LENFR BLD TEG: 1.2 % (ref 0–8)
CO2 SERPL-SCNC: 21 MMOL/L (ref 20–33)
CREAT SERPL-MCNC: 0.59 MG/DL (ref 0.5–1.4)
CT.EXTRINSIC BLD ROTEM: 1.2 MIN (ref 1–3)
ERYTHROCYTE [DISTWIDTH] IN BLOOD BY AUTOMATED COUNT: 54 FL (ref 35.9–50)
GFR SERPL CREATININE-BSD FRML MDRD: >60 ML/MIN/1.73 M 2
GLOBULIN SER CALC-MCNC: 2.2 G/DL (ref 1.9–3.5)
GLUCOSE SERPL-MCNC: 81 MG/DL (ref 65–99)
HCT VFR BLD AUTO: 35.8 % (ref 37–47)
HGB BLD-MCNC: 11.3 G/DL (ref 12–16)
INR PPP: 0.93 (ref 0.87–1.13)
MCF BLD TEG: 71 MM (ref 50–70)
MCH RBC QN AUTO: 32.7 PG (ref 27–33)
MCHC RBC AUTO-ENTMCNC: 31.6 G/DL (ref 33.6–35)
MCV RBC AUTO: 103.5 FL (ref 81.4–97.8)
PA AA BLD-ACNC: 90.3 %
PA ADP BLD-ACNC: 13 %
PLATELET # BLD AUTO: 299 K/UL (ref 164–446)
PMV BLD AUTO: 10.2 FL (ref 9–12.9)
POTASSIUM SERPL-SCNC: 4.2 MMOL/L (ref 3.6–5.5)
PROT SERPL-MCNC: 5.5 G/DL (ref 6–8.2)
PROTHROMBIN TIME: 12.8 SEC (ref 12–14.6)
RBC # BLD AUTO: 3.46 M/UL (ref 4.2–5.4)
SODIUM SERPL-SCNC: 141 MMOL/L (ref 135–145)
TEG ALGORITHM TGALG: ABNORMAL
WBC # BLD AUTO: 8.5 K/UL (ref 4.8–10.8)

## 2017-05-25 PROCEDURE — A9270 NON-COVERED ITEM OR SERVICE: HCPCS | Performed by: INTERNAL MEDICINE

## 2017-05-25 PROCEDURE — 85576 BLOOD PLATELET AGGREGATION: CPT | Mod: 91

## 2017-05-25 PROCEDURE — 700102 HCHG RX REV CODE 250 W/ 637 OVERRIDE(OP): Performed by: INTERNAL MEDICINE

## 2017-05-25 PROCEDURE — 700102 HCHG RX REV CODE 250 W/ 637 OVERRIDE(OP): Performed by: NURSE PRACTITIONER

## 2017-05-25 PROCEDURE — 85610 PROTHROMBIN TIME: CPT

## 2017-05-25 PROCEDURE — 700111 HCHG RX REV CODE 636 W/ 250 OVERRIDE (IP): Performed by: NURSE PRACTITIONER

## 2017-05-25 PROCEDURE — 80053 COMPREHEN METABOLIC PANEL: CPT

## 2017-05-25 PROCEDURE — 36415 COLL VENOUS BLD VENIPUNCTURE: CPT

## 2017-05-25 PROCEDURE — 700101 HCHG RX REV CODE 250: Performed by: HOSPITALIST

## 2017-05-25 PROCEDURE — 700111 HCHG RX REV CODE 636 W/ 250 OVERRIDE (IP): Performed by: HOSPITALIST

## 2017-05-25 PROCEDURE — 700102 HCHG RX REV CODE 250 W/ 637 OVERRIDE(OP): Performed by: PSYCHIATRY & NEUROLOGY

## 2017-05-25 PROCEDURE — 700111 HCHG RX REV CODE 636 W/ 250 OVERRIDE (IP): Performed by: INTERNAL MEDICINE

## 2017-05-25 PROCEDURE — A9270 NON-COVERED ITEM OR SERVICE: HCPCS | Performed by: PSYCHIATRY & NEUROLOGY

## 2017-05-25 PROCEDURE — 85027 COMPLETE CBC AUTOMATED: CPT

## 2017-05-25 PROCEDURE — 85384 FIBRINOGEN ACTIVITY: CPT

## 2017-05-25 PROCEDURE — A9270 NON-COVERED ITEM OR SERVICE: HCPCS | Performed by: NURSE PRACTITIONER

## 2017-05-25 PROCEDURE — 700102 HCHG RX REV CODE 250 W/ 637 OVERRIDE(OP): Performed by: HOSPITALIST

## 2017-05-25 PROCEDURE — 99232 SBSQ HOSP IP/OBS MODERATE 35: CPT | Performed by: HOSPITALIST

## 2017-05-25 PROCEDURE — 85347 COAGULATION TIME ACTIVATED: CPT

## 2017-05-25 PROCEDURE — 770001 HCHG ROOM/CARE - MED/SURG/GYN PRIV*

## 2017-05-25 PROCEDURE — A9270 NON-COVERED ITEM OR SERVICE: HCPCS | Performed by: HOSPITALIST

## 2017-05-25 RX ADMIN — SULFASALAZINE 1000 MG: 500 TABLET ORAL at 20:34

## 2017-05-25 RX ADMIN — Medication 500 MG: at 17:28

## 2017-05-25 RX ADMIN — RISPERIDONE 1 MG: 0.5 TABLET, FILM COATED ORAL at 08:52

## 2017-05-25 RX ADMIN — FERROUS GLUCONATE 324 MG: 324 TABLET ORAL at 06:05

## 2017-05-25 RX ADMIN — DULOXETINE HYDROCHLORIDE 60 MG: 60 CAPSULE, DELAYED RELEASE ORAL at 08:50

## 2017-05-25 RX ADMIN — GABAPENTIN 600 MG: 300 CAPSULE ORAL at 15:10

## 2017-05-25 RX ADMIN — ENOXAPARIN SODIUM 40 MG: 100 INJECTION SUBCUTANEOUS at 09:04

## 2017-05-25 RX ADMIN — OXYCODONE HYDROCHLORIDE 10 MG: 10 TABLET ORAL at 12:50

## 2017-05-25 RX ADMIN — ACETAZOLAMIDE 500 MG: 500 CAPSULE, EXTENDED RELEASE ORAL at 20:39

## 2017-05-25 RX ADMIN — KETOROLAC TROMETHAMINE 30 MG: 30 INJECTION, SOLUTION INTRAMUSCULAR at 00:40

## 2017-05-25 RX ADMIN — PROMETHAZINE HYDROCHLORIDE 25 MG: 25 TABLET ORAL at 15:10

## 2017-05-25 RX ADMIN — PROMETHAZINE HYDROCHLORIDE 25 MG: 25 TABLET ORAL at 08:50

## 2017-05-25 RX ADMIN — DIPHENHYDRAMINE HYDROCHLORIDE 50 MG: 50 INJECTION, SOLUTION INTRAMUSCULAR; INTRAVENOUS at 20:34

## 2017-05-25 RX ADMIN — METHOTREXATE 20 MG: 2.5 TABLET ORAL at 08:57

## 2017-05-25 RX ADMIN — FOLIC ACID 1 MG: 1 TABLET ORAL at 08:50

## 2017-05-25 RX ADMIN — ZOLPIDEM TARTRATE 10 MG: 5 TABLET, FILM COATED ORAL at 01:30

## 2017-05-25 RX ADMIN — POTASSIUM CHLORIDE AND SODIUM CHLORIDE: 900; 150 INJECTION, SOLUTION INTRAVENOUS at 15:10

## 2017-05-25 RX ADMIN — BUTALBITAL, ACETAMINOPHEN, AND CAFFEINE 1 TABLET: 50; 325; 40 TABLET ORAL at 20:35

## 2017-05-25 RX ADMIN — LORAZEPAM 0.5 MG: 1 TABLET ORAL at 12:50

## 2017-05-25 RX ADMIN — LORAZEPAM 0.5 MG: 1 TABLET ORAL at 20:35

## 2017-05-25 RX ADMIN — GABAPENTIN 600 MG: 300 CAPSULE ORAL at 08:51

## 2017-05-25 RX ADMIN — RISPERIDONE 1 MG: 0.5 TABLET, FILM COATED ORAL at 20:35

## 2017-05-25 RX ADMIN — Medication 500 MG: at 06:05

## 2017-05-25 RX ADMIN — OXYCODONE HYDROCHLORIDE 10 MG: 10 TABLET ORAL at 17:28

## 2017-05-25 RX ADMIN — OXYCODONE HYDROCHLORIDE 10 MG: 10 TABLET ORAL at 22:34

## 2017-05-25 RX ADMIN — ASPIRIN 81 MG: 81 TABLET, CHEWABLE ORAL at 08:50

## 2017-05-25 RX ADMIN — FERROUS GLUCONATE 324 MG: 324 TABLET ORAL at 17:28

## 2017-05-25 RX ADMIN — DIPHENHYDRAMINE HYDROCHLORIDE 50 MG: 50 INJECTION, SOLUTION INTRAMUSCULAR; INTRAVENOUS at 15:10

## 2017-05-25 RX ADMIN — DULOXETINE HYDROCHLORIDE 60 MG: 60 CAPSULE, DELAYED RELEASE ORAL at 20:35

## 2017-05-25 RX ADMIN — DIPHENHYDRAMINE HYDROCHLORIDE 50 MG: 50 INJECTION, SOLUTION INTRAMUSCULAR; INTRAVENOUS at 00:40

## 2017-05-25 RX ADMIN — SULFASALAZINE 1000 MG: 500 TABLET ORAL at 08:56

## 2017-05-25 RX ADMIN — KETOROLAC TROMETHAMINE 30 MG: 30 INJECTION, SOLUTION INTRAMUSCULAR at 09:00

## 2017-05-25 RX ADMIN — GABAPENTIN 600 MG: 300 CAPSULE ORAL at 20:34

## 2017-05-25 RX ADMIN — PREDNISONE 10 MG: 10 TABLET ORAL at 08:51

## 2017-05-25 RX ADMIN — DIPHENHYDRAMINE HYDROCHLORIDE 50 MG: 50 INJECTION, SOLUTION INTRAMUSCULAR; INTRAVENOUS at 09:00

## 2017-05-25 RX ADMIN — ACETAZOLAMIDE 500 MG: 500 CAPSULE, EXTENDED RELEASE ORAL at 08:50

## 2017-05-25 ASSESSMENT — ENCOUNTER SYMPTOMS
SHORTNESS OF BREATH: 0
COUGH: 0
SPUTUM PRODUCTION: 0
BLURRED VISION: 1
SENSORY CHANGE: 0
HEADACHES: 1
DOUBLE VISION: 0
DIARRHEA: 1
CHILLS: 0
NAUSEA: 1
VOMITING: 0
FEVER: 0
BACK PAIN: 0
MYALGIAS: 0
PALPITATIONS: 0
ABDOMINAL PAIN: 0
DIZZINESS: 0
CONSTIPATION: 0
PHOTOPHOBIA: 0

## 2017-05-25 ASSESSMENT — PAIN SCALES - GENERAL: PAINLEVEL_OUTOF10: 8

## 2017-05-25 NOTE — PROGRESS NOTES
recieved report from off going nurse. Nurse stated no sig med event during event. Pt has c/o pain and will medicate per orders. Vss. Family at bedside. Will continue to monitor.

## 2017-05-25 NOTE — PROGRESS NOTES
Hospital Medicine Progress Note, Adult, Complex               Author: Latasha Johnson Date & Time created: 5/25/2017  1:22 PM     Interval History:  Hospital Course:  Enedelia Pang is a 46 y.o. female w/ h/o pseudotumor cerebri, CVA, prior  shunt with infection thus removal admitted 5/20/2017 for migraine headache.     Today, the patients pain is better controlled. Denies new complaint. Nausea continues, but has improved. HA improved after LP. Blurred vision intermittently, states it is always that way after LP. She wishes for a second opinion from NS, will call the NS group on call.    Review of Systems:  Review of Systems   Constitutional: Negative for fever, chills and malaise/fatigue.   Eyes: Positive for blurred vision (intermittent blurred vision). Negative for double vision and photophobia.   Respiratory: Negative for cough, sputum production and shortness of breath.    Cardiovascular: Negative for chest pain, palpitations and leg swelling.   Gastrointestinal: Positive for nausea (improved but continues) and diarrhea. Negative for vomiting, abdominal pain and constipation.   Genitourinary: Negative for dysuria.   Musculoskeletal: Positive for joint pain (throughout, severe uncontrolled). Negative for myalgias and back pain.   Neurological: Positive for headaches (bilateral frontal headache, controlled). Negative for dizziness and sensory change.   All other systems reviewed and are negative.      Physical Exam:  Physical Exam   Constitutional: She is oriented to person, place, and time. She appears well-developed and well-nourished. No distress.   HENT:   Head: Normocephalic and atraumatic.   Eyes: Conjunctivae are normal.   Neck: Normal range of motion. Neck supple.   Cardiovascular: Normal rate and regular rhythm.    No murmur heard.  Pulmonary/Chest: Effort normal and breath sounds normal. No respiratory distress. She has no rales.   Abdominal: Soft. Bowel sounds are normal. There is no tenderness. There  is no guarding.   Musculoskeletal: Normal range of motion.   Strength 3/5 throughout   Neurological: She is alert and oriented to person, place, and time.   Skin: Skin is warm and dry. No rash noted.   Vitals reviewed.      Labs:        Invalid input(s): [id number]      Recent Labs      17   SODIUM  137  140  141   POTASSIUM  3.7  3.7  4.2   CHLORIDE  110  109  113*   CO2  17*  22  21   BUN  8  9  10   CREATININE  0.51  0.59  0.59   MAGNESIUM  2.1   --    --    CALCIUM  9.3  9.4  8.3*     Recent Labs      17   030   ALTSGPT  431*  291*  166*   ASTSGOT  91*  44  23   ALKPHOSPHAT  169*  144*  99   TBILIRUBIN  0.5  0.3  0.2   GLUCOSE  111*  100*  81     Recent Labs      17   1044   RBC  3.97*  3.75*  3.46*   --    HEMOGLOBIN  12.7  12.1  11.3*   --    HEMATOCRIT  39.3  38.0  35.8*   --    PLATELETCT  373  383  299   --    PROTHROMBTM   --    --    --   12.8   INR   --    --    --   0.93     Recent Labs      17   030   WBC  7.7  6.3  8.5   NEUTSPOLYS  82.30*   --    --    LYMPHOCYTES  13.20*   --    --    MONOCYTES  3.50   --    --    EOSINOPHILS  0.00   --    --    BASOPHILS  0.40   --    --    ASTSGOT  91*  44  23   ALTSGPT  431*  291*  166*   ALKPHOSPHAT  169*  144*  99   TBILIRUBIN  0.5  0.3  0.2           Hemodynamics:  Temp (24hrs), Av.6 °C (97.8 °F), Min:36.4 °C (97.5 °F), Max:36.7 °C (98.1 °F)  Temperature: 36.4 °C (97.6 °F)  Pulse  Av.6  Min: 78  Max: 150  Blood Pressure: 109/72 mmHg     Respiratory:    Respiration: 16, Pulse Oximetry: 98 %           Fluids:    Intake/Output Summary (Last 24 hours) at 17 1322  Last data filed at 17 0800   Gross per 24 hour   Intake   2300 ml   Output    200 ml   Net   2100 ml        GI/Nutrition:  Orders Placed This Encounter   Procedures   • Diet Order     Standing  Status: Standing      Number of Occurrences: 1      Standing Expiration Date:      Order Specific Question:  Diet:     Answer:  Regular [1]     Medical Decision Making, by Problem:  Active Hospital Problems    Diagnosis   • *Status migrainosus [G43.901]- stable to baseline headache status  -Cont diamox, PRN benadryl, fioricet, toradol  -LP today- opening pressure 36, glucose 67, protein 38, WBC 4; improvement of HA noted  -CT brain- neg  -D/W Dr Oh- he has recommended that she see him OP, she had a LP shunt in the past that was marginally effective thus it was removed. Per NS no need for emergent procedure, may be f/u as OP in his office  -appreciate second opinion Dr Berry per patient request  -Appreciate neurology consult, Dr Ibarra   • Whole body pain [R52]- undergoing workup for autoimmune condition OP  -Cont home gabapentin, oxycodone  -Trial of demerol for pain management completed, pain improved   • Nasal congestion [R09.81]- saline rinse PRN   • Diarrhea [R19.7]- resolevd   • LFT elevation [R94.5]- improving, suspect drug related  -US RUQ neg  -Trend  -If worsened stop methotrexate per rheumatology   • Anxiety [F41.9]  -Appreciate psychiatry consult, Dr Puga   • Rheumatoid arthritis (CMS-HCC) [M06.9]- OP workup   • Chronic pain [G89.29]     BC positive 1/2 bottles- coag neg staph  -Suspect contaminant, repeat BC pending- neg thus far    N/V- PRN reglan, improved    Diarrhea- chronic for many months per patient  -Low susp for infectious etiology without leukocytosis, abd pain, or fever  -Encourage PO hydration    Dispo: home once improved    Labs reviewed and Medications reviewed  Lopez catheter: No Lopez      DVT Prophylaxis: Enoxaparin (Lovenox)    Ulcer prophylaxis: Not indicated    Assessed for rehab: Patient was assess for and/or received rehabilitation services during this hospitalization

## 2017-05-25 NOTE — PSYCHIATRY
PSYCHIATRIC FOLLOW-UP    Chief Complaint   Patient presents with   • Headache     Pt states she has hx of complex migraines. Pt is confused and having difficulty finding the right words. Pupils are PERRLA. Hand  are strong and equal bilateral. Lower extremities are strong and equal bilaterally.    • Palpitations     Pt is tachycardicm;    • Altered Mental Status     Pt unable to verbalize time or place. Pt is able to verbalize her event, but gets confused at times. Pt's  at bedside          Subjective:  Ms. Pang is 45 yo female with PMHx of pseudotumor cerebri with previous shunt, CVA 2007, possible RA/lupus, occipital nerve stimulator and pituitary tumor? admitted on 5/20 for persistent migraines, palpitations and AMS.    Ms. Pang reports improvement, but not resolution of her hallucinations. She describes them as just shadows in her peripheral vision. She reports sleeping well last night since starting the demerol. Patient denies SI and wants resources for therapy that take Medicaid as an outpatient. Her migraines have improved after LP and pain management.       Mental Status Examination  Vitals: Blood Pressure: 109/72 mmHg, Pulse: 82, Respiration: 16, Temperature: 36.4 °C (97.6 °F), Pulse Oximetry: 98 %, O2 (LPM): 2.5, O2 Delivery: Nasal Cannula  Appearance: Patient appears to be stated age, friendly, cooperative and maintained good eye contact during the interview. She was dressed in a clean hospital gown, well groomed and has short bright red dyed hair.  Thoughts: Her thought process are logical and goal oriented. Thought content demonstrated auditory and visual hallucinations, and SI  Speech:Speech is spontaneous clear, normal rate, pitch and latency  Mood: Calm  Affect: Consistent with mood  SI/HI: SI thoughts, but no plans  Attention/Alertness:   Demonstrated adequate attention span with no distractibility or frustration  Memory: Good immediate, short and long term memory         Orientation:  AXO4     Fund of Knowledge:  Average based on vocabulary and syntax    Insight/Judgement into symptoms: Good  Neurological Testing:MMSE 29/30    Medical Systems Reviewed (pain, new complaint, etc)    Laboratory / Imaging / Other Testing Results   Recent Labs      05/23/17 0234 05/24/17 0308 05/25/17   0305   SODIUM  137  140  141   POTASSIUM  3.7  3.7  4.2   CHLORIDE  110  109  113*   CO2  17*  22  21   BUN  8  9  10   CREATININE  0.51  0.59  0.59   MAGNESIUM  2.1   --    --    CALCIUM  9.3  9.4  8.3*     Recent Labs      05/23/17 0234 05/24/17 0308  05/25/17   0305   ALTSGPT  431*  291*  166*   ASTSGOT  91*  44  23   ALKPHOSPHAT  169*  144*  99   TBILIRUBIN  0.5  0.3  0.2   GLUCOSE  111*  100*  81     Recent Labs      05/23/17 0234 05/24/17 0308 05/25/17   0305   RBC  3.97*  3.75*  3.46*   HEMOGLOBIN  12.7  12.1  11.3*   HEMATOCRIT  39.3  38.0  35.8*   PLATELETCT  373  383  299     Recent Labs      05/23/17 0234 05/24/17 0308 05/25/17   0305   WBC  7.7  6.3  8.5   NEUTSPOLYS  82.30*   --    --    LYMPHOCYTES  13.20*   --    --    MONOCYTES  3.50   --    --    EOSINOPHILS  0.00   --    --    BASOPHILS  0.40   --    --    ASTSGOT  91*  44  23   ALTSGPT  431*  291*  166*   ALKPHOSPHAT  169*  144*  99   TBILIRUBIN  0.5  0.3  0.2       Assessment  Ms. Pang is 47 yo female with PMHx of pseudotumor cerebri with previous shunt, CVA 2007, possible RA/lupus, occipital nerve stimulator and pituitary tumor? admitted on 5/20 for persistent migraines, palpitations and AMS  Psychiatric:    #Auditory and visual hallucinations  -greatly improved, continue Risperdal at current dose    #Suicidal ideation  -Thoughts of suicide, but states she will not act on them even if discharged today. Patient wants referral for outpatient therapy that takes medicaid.       Plan  -Continue Risperdal 1mg  -Referral outpatient behavioral therapy     - Legal Hold Status: not on legal hold  - Capacity: not  formally evaluated  - Records reviewed: yes  - Medications: no new psychotropic medications at this time  - Orders: None  - Collateral: obtained with permission  - Signing Off  - Thank you for consult

## 2017-05-25 NOTE — PROGRESS NOTES
NEUROSURGERY:    Consult dictated  Seen in conjunction with Dr Berry    Plan:  D/C aspirin and toradol  Repeat TEG tues  OR on wed for  shunt  From NS standpoint she could go home and come back for surgery or remain in house  If she feels that she is worsening she could have another LP in IR to hold her over until surgery

## 2017-05-25 NOTE — PROGRESS NOTES
Neurosurgery    Asked to comment on this complicated patient. She got LP shunt for reported pseudotumor cerebri in [place], and occipital nerve stimulator in [place] for one of many headaches syndromes. I have replaced the occipital nerve stimulator battery for her. Had issues with efficacy of LP shunt in past, I explored it and it appeared to be working, then it finally removed at request of ID here at [facility] for other site infection concerns, she had meningitis like picture. Intermittent CSF taps since. She may need another LP shunt in the future, but without vision changes, this does not need to be done emergently. Follow-up as outpatient to discuss.

## 2017-05-25 NOTE — CONSULTS
DATE OF SERVICE:  05/25/2017    NEUROSURGICAL CONSULTATION    CONSULTING PHYSICIAN:  Drew Berry MD    CHIEF COMPLAINT:  Headache and visual changes.    HISTORY OF PRESENT ILLNESS:  The patient is a 46-year-old patient who had been   diagnosed with idiopathic intracranial hypertension and had a LP shunt in   place.  It had to be removed about a year ago secondary to infection.  This   was removed by Dr. Oh with [facility].  The patient states that Dr. Oh   thought that she may be able to do without the shunt.  She reports having   seen neurology, Dr. Velasquez, at [facility] about 6 months   ago and she prescribed Diamox.  The patient has had ramping up of her   headaches over the last 6 months or so and visual changes as wel with diplopia. She has a   history longstanding of migraines and has an occipital nerve stimulator in   place.  She was admitted to [facility] on 05/20/2017, with ongoing headaches as   well as muscle pains, altered mental status.  She was admitted for further   workup.  She has undergone 2 diagnostic lumbar punctures with the first   opening pressure being 36; the second one, the opening pressure was 38.  She   reports improvement after the lumbar punctures for a few days.  She states   that her last lumbar puncture was about 4 days ago and she is starting to have   an increase in her headaches at this time, although not as severe as prior to   the lumbar puncture.  She has also been experiencing issues with   hallucinations and apparently with suicidal ideation, although this was not   stated to us today.  She has been evaluated by psychiatry as well as   neurology.  Psychiatry recommended outpatient behavioral therapy.  Neurology   recommends a followup as an outpatient as well.    PAST MEDICAL HISTORY:  1. Migraines.  2. Idiopathic intracranial hypertension.  3. Suicidal ideation.  4. Depression.  5. History of Clostridium difficile.  6. Questionable history of  rheumatoid arthritis.  7. History of neuropathy.  8. Anxiety.  9. Chronic intermittent hypokalemia.    PAST SURGICAL HISTORY:  1. Cholecystectomy.  2. Tonsillectomy.  3. Appendectomy.  4. Tubal ligation.  5. Right knee surgery.  6. Placement of occipital nerve stimulator.  7. LP shunt with subsequent removal, secondary to infection.    HOME MEDICATIONS:  1. Diamox 500 mg p.o. b.i.d.  2. Calcium carbonate 1 tablet p.o. b.i.d.  3. Cymbalta 60 mg b.i.d.  4. Ferrous gluconate 324 mg b.i.d.  5. Folic acid daily.  6. Neurontin 600 mg t.i.d.  7. Magnesium oxide 400 mg daily.  8. Methotrexate 20 mg on Thursdays.  9. Multivitamin daily.  10. Omeprazole 20 mg daily.  11. Oxycodone 10 mg every 4 hours p.r.n.  12. Prednisone 10 mg daily.  13. Risperdal 0.5 mg p.o. b.i.d.  14. Sulfasalazine 500 mg 2 tablets p.o. b.i.d.  15. Valerian root 500 mg at bedtime.  16. Vitamin D 1000 units daily.    ALLERGIES TO MEDICATIONS:  1. PROCHLORPERAZINE.  2. SUMATRIPTAN.  3. VANCOMYCIN.    REVIEW OF SYSTEMS:  Negative other than as outlined above.  She does report   intermittent visual changes.  Sometimes, she can use her glasses and   sometimes, she cannot.    ASSESSMENT:  General: well groomed, well nourished  Integumentary: skin intact  CV: sinus rhythm  Pulmonary: no shortness of breath  Neurological: awake, alert, oriented x 3, speech clear and fluent, full fund of knowledge, perrl, pupils 6mm, EOM's intact, right visual field cut, sensation = V1, V2, V3, no drift, no dysmetria, strength = BUE/BLE    IMAGING STUDIES:  CT scan of head without contrast, Dr. Berry reviewed the   imaging studies dated 05/20/2017.  The ventricles are of normal size.  She   does not have slit ventricles.  There is no mass or mass effect.  No acute   intracranial process.    LABORATORY RESULTS:  TEG study, she is 90% inhibited.  AA of 90% inhibited,   ADP is 13% inhibited.  PT/INR is within normal limits.  Hematology and   chemistry are  unremarkable.    IMPRESSION AND PLAN:  The patient has a history of idiopathic intracranial   hypertension with prior LP shunt that had to be removed secondary to   infection.  She presents with progressively worsening headaches and visual   changes with elevated opening pressures on lumbar puncture.    We will go ahead and put her on the surgery schedule for   next Wednesday, 05/31/2017, for placement of a ventriculoperitoneal shunt.    Dr. Berry discussed the risks of surgery including bleeding, infection,   worsening neurological condition, persistent cerebrospinal fluid leak, stroke,   paralysis and death.  The patient wishes to proceed with surgery.  We have   stopped her aspirin.  We will recheck a TEG study on Tuesday.  We will also   stop her Toradol.  From a neurosurgical standpoint, she can discharge home or   remain in house.  If she remains in house, she can have another lumbar   puncture if her headaches worsen significantly, which may hold her over until   surgery.       ____________________________________     WILY ENGEL / EUGENE    DD:  05/25/2017 12:31:54  DT:  05/25/2017 16:57:56    D#:  [id number]  Job#:  [id number]

## 2017-05-25 NOTE — CONSULTS
DATE OF SERVICE:  05/25/2017    CHIEF COMPLAINT:  Headache and vision loss with a history of pseudotumor   cerebri.  Please refer to the detailed consultation note dictated by WILY Miles.    HISTORY OF PRESENT ILLNESS:  The patient is a 46-year-old woman who is   suffering with signs and symptoms consistent with idiopathic intracranial   hypertension or pseudotumor cerebri.  She is somewhat of a poor historian with   regards to her past medical care here in [place].  She was seen by neurologist   over at [facility] who continued to prescribe her, her Diamox.    She is admitted to the internal medicine service with an increasing headache.    She apparently, underwent a lumbar puncture about three weeks ago with an   opening pressure of 36 or 38.  She underwent a repeat lumbar puncture a day or   two ago with an opening pressure of 36 or 38, clearly pathologic.    I asked her how she is doing today and she tells me while she is medicated her   headache is about a 6/10 and when it is not medicated, will be 8/10.  She   tells me that her visual symptoms wax and wane.  I did detect on examination,   a field cut on the patient's right side, particularly in her right eye.    We discussed the role for operation, which will be a ventricular peritoneal   shunt.    Risks of the operation include bleeding, infection, worsening neurological   condition, no improvement in symptoms, stroke, hemorrhage, paralysis, and   death.  Other risks of the operation include as previously mentioned, no   improvement in her symptoms.    Other risks include shunt infection, which necessitates removing the infected   shunt and then treating her with antibiotics and then replacing the shunt.    If she has worsening symptoms, then she should get serial LPs through the   weekend as needed.  The patient tells me that she knew when she needs to get a   lumbar puncture and she is heading in that direction.  I asked her if we   should  make arrangements for her to get an LP today and she declined.    After a thorough discussion of the risks and benefits of surgery, she wishes   to proceed with operation.  We are putting her on the OR scheduled for next   Wednesday.  I answered all of the patient's questions with her    present.       ____________________________________     MD ROGER DORSEY / EUGENE    DD:  05/25/2017 12:08:19  DT:  05/25/2017 16:26:59    D#:  [id number]  Job#:  [id number]

## 2017-05-26 PROBLEM — R11.2 NAUSEA AND VOMITING: Status: ACTIVE | Noted: 2017-05-26

## 2017-05-26 PROBLEM — R78.81 POSITIVE BLOOD CULTURES: Status: ACTIVE | Noted: 2017-05-26

## 2017-05-26 LAB
APTT PPP: 25.8 SEC (ref 24.7–36)
BACTERIA BLD CULT: NORMAL
BACTERIA CSF CULT: NORMAL
GRAM STN SPEC: NORMAL
SIGNIFICANT IND 70042: NORMAL
SIGNIFICANT IND 70042: NORMAL
SITE SITE: NORMAL
SITE SITE: NORMAL
SOURCE SOURCE: NORMAL
SOURCE SOURCE: NORMAL

## 2017-05-26 PROCEDURE — 85730 THROMBOPLASTIN TIME PARTIAL: CPT

## 2017-05-26 PROCEDURE — 700111 HCHG RX REV CODE 636 W/ 250 OVERRIDE (IP): Performed by: NURSE PRACTITIONER

## 2017-05-26 PROCEDURE — A9270 NON-COVERED ITEM OR SERVICE: HCPCS | Performed by: PSYCHIATRY & NEUROLOGY

## 2017-05-26 PROCEDURE — A9270 NON-COVERED ITEM OR SERVICE: HCPCS | Performed by: HOSPITALIST

## 2017-05-26 PROCEDURE — 700102 HCHG RX REV CODE 250 W/ 637 OVERRIDE(OP): Performed by: NURSE PRACTITIONER

## 2017-05-26 PROCEDURE — 700101 HCHG RX REV CODE 250: Performed by: HOSPITALIST

## 2017-05-26 PROCEDURE — 700102 HCHG RX REV CODE 250 W/ 637 OVERRIDE(OP): Performed by: HOSPITALIST

## 2017-05-26 PROCEDURE — 36415 COLL VENOUS BLD VENIPUNCTURE: CPT

## 2017-05-26 PROCEDURE — 770001 HCHG ROOM/CARE - MED/SURG/GYN PRIV*

## 2017-05-26 PROCEDURE — A9270 NON-COVERED ITEM OR SERVICE: HCPCS | Performed by: NURSE PRACTITIONER

## 2017-05-26 PROCEDURE — 700111 HCHG RX REV CODE 636 W/ 250 OVERRIDE (IP): Performed by: HOSPITALIST

## 2017-05-26 PROCEDURE — 99231 SBSQ HOSP IP/OBS SF/LOW 25: CPT | Performed by: HOSPITALIST

## 2017-05-26 PROCEDURE — 700102 HCHG RX REV CODE 250 W/ 637 OVERRIDE(OP): Performed by: PSYCHIATRY & NEUROLOGY

## 2017-05-26 RX ADMIN — HYDROMORPHONE HYDROCHLORIDE 1 MG: 1 INJECTION, SOLUTION INTRAMUSCULAR; INTRAVENOUS; SUBCUTANEOUS at 12:49

## 2017-05-26 RX ADMIN — HYDROMORPHONE HYDROCHLORIDE 1 MG: 1 INJECTION, SOLUTION INTRAMUSCULAR; INTRAVENOUS; SUBCUTANEOUS at 22:19

## 2017-05-26 RX ADMIN — DULOXETINE HYDROCHLORIDE 60 MG: 60 CAPSULE, DELAYED RELEASE ORAL at 08:33

## 2017-05-26 RX ADMIN — ACETAZOLAMIDE 500 MG: 500 CAPSULE, EXTENDED RELEASE ORAL at 21:18

## 2017-05-26 RX ADMIN — ACETAZOLAMIDE 500 MG: 500 CAPSULE, EXTENDED RELEASE ORAL at 08:33

## 2017-05-26 RX ADMIN — GABAPENTIN 600 MG: 300 CAPSULE ORAL at 08:32

## 2017-05-26 RX ADMIN — OXYCODONE HYDROCHLORIDE 10 MG: 10 TABLET ORAL at 21:17

## 2017-05-26 RX ADMIN — SULFASALAZINE 1000 MG: 500 TABLET ORAL at 21:17

## 2017-05-26 RX ADMIN — Medication 500 MG: at 08:33

## 2017-05-26 RX ADMIN — PREDNISONE 10 MG: 10 TABLET ORAL at 08:33

## 2017-05-26 RX ADMIN — ENOXAPARIN SODIUM 40 MG: 100 INJECTION SUBCUTANEOUS at 08:34

## 2017-05-26 RX ADMIN — SULFASALAZINE 1000 MG: 500 TABLET ORAL at 08:33

## 2017-05-26 RX ADMIN — FOLIC ACID 1 MG: 1 TABLET ORAL at 08:33

## 2017-05-26 RX ADMIN — DIPHENHYDRAMINE HYDROCHLORIDE 50 MG: 50 INJECTION, SOLUTION INTRAMUSCULAR; INTRAVENOUS at 05:38

## 2017-05-26 RX ADMIN — RISPERIDONE 1 MG: 0.5 TABLET, FILM COATED ORAL at 08:33

## 2017-05-26 RX ADMIN — DULOXETINE HYDROCHLORIDE 60 MG: 60 CAPSULE, DELAYED RELEASE ORAL at 21:18

## 2017-05-26 RX ADMIN — POTASSIUM CHLORIDE AND SODIUM CHLORIDE: 900; 150 INJECTION, SOLUTION INTRAVENOUS at 14:15

## 2017-05-26 RX ADMIN — RISPERIDONE 1 MG: 0.5 TABLET, FILM COATED ORAL at 21:00

## 2017-05-26 RX ADMIN — POTASSIUM CHLORIDE AND SODIUM CHLORIDE: 900; 150 INJECTION, SOLUTION INTRAVENOUS at 22:05

## 2017-05-26 RX ADMIN — HYDROMORPHONE HYDROCHLORIDE 1 MG: 1 INJECTION, SOLUTION INTRAMUSCULAR; INTRAVENOUS; SUBCUTANEOUS at 17:32

## 2017-05-26 RX ADMIN — LORAZEPAM 0.5 MG: 1 TABLET ORAL at 08:42

## 2017-05-26 RX ADMIN — DIPHENHYDRAMINE HYDROCHLORIDE 50 MG: 50 INJECTION, SOLUTION INTRAMUSCULAR; INTRAVENOUS at 21:17

## 2017-05-26 RX ADMIN — DIPHENHYDRAMINE HYDROCHLORIDE 50 MG: 50 INJECTION, SOLUTION INTRAMUSCULAR; INTRAVENOUS at 14:15

## 2017-05-26 RX ADMIN — OXYCODONE HYDROCHLORIDE 10 MG: 10 TABLET ORAL at 08:33

## 2017-05-26 RX ADMIN — GABAPENTIN 600 MG: 300 CAPSULE ORAL at 21:18

## 2017-05-26 RX ADMIN — FERROUS GLUCONATE 324 MG: 324 TABLET ORAL at 17:32

## 2017-05-26 RX ADMIN — Medication 500 MG: at 17:32

## 2017-05-26 RX ADMIN — GABAPENTIN 600 MG: 300 CAPSULE ORAL at 15:51

## 2017-05-26 RX ADMIN — OXYCODONE HYDROCHLORIDE 10 MG: 10 TABLET ORAL at 03:07

## 2017-05-26 RX ADMIN — FERROUS GLUCONATE 324 MG: 324 TABLET ORAL at 08:33

## 2017-05-26 ASSESSMENT — ENCOUNTER SYMPTOMS
SENSORY CHANGE: 0
DIZZINESS: 0
NAUSEA: 0
VOMITING: 0
PHOTOPHOBIA: 0
FEVER: 0
HEADACHES: 1
CHILLS: 0
BLURRED VISION: 1
ABDOMINAL PAIN: 0
PALPITATIONS: 0
SPUTUM PRODUCTION: 0
MYALGIAS: 0
BACK PAIN: 0
COUGH: 0
DIARRHEA: 1
DOUBLE VISION: 0

## 2017-05-26 ASSESSMENT — PAIN SCALES - GENERAL
PAINLEVEL_OUTOF10: 9
PAINLEVEL_OUTOF10: 8
PAINLEVEL_OUTOF10: 7
PAINLEVEL_OUTOF10: 9
PAINLEVEL_OUTOF10: 8

## 2017-05-26 NOTE — PROGRESS NOTES
"Hospital Medicine Interval Note  Date of Service:  5/26/2017    Chief Complaint  Enedelia Pang is a 46 y.o. female w/ h/o pseudotumor cerebri, CVA, prior  shunt with infection thus removal admitted 5/20/2017 for migraine headache.     Interval Problem Update  Today, the patients headache is worse and her vision feels as thought it is more blurry. She wishes for a LP. Pain is uncontrolled- wants more pain medication. Denies N/V, diarrhea improved.     We discussed she may have her LP and go home prior to having surgery however she is \"not ready\" stating her pain is not controlled.     Consultants/Specialty  Dr Berry- Neurosurgery  Dr Ibarra- neurology    Disposition  Home once improved     Review of Systems   Constitutional: Negative for fever, chills and malaise/fatigue.   Eyes: Positive for blurred vision (intermittent blurred vision). Negative for double vision and photophobia.   Respiratory: Negative for cough and sputum production.    Cardiovascular: Negative for chest pain, palpitations and leg swelling.   Gastrointestinal: Positive for diarrhea (imrpoved). Negative for nausea (resolved), vomiting and abdominal pain.   Genitourinary: Negative for dysuria.   Musculoskeletal: Positive for joint pain (throughout, severe ). Negative for myalgias and back pain.   Neurological: Positive for headaches (bilateral frontal headache, worse today). Negative for dizziness and sensory change.   All other systems reviewed and are negative.     Physical Exam Laboratory/Imaging   Filed Vitals:    05/25/17 1600 05/25/17 2000 05/26/17 0400 05/26/17 0724   BP: 115/71 121/72 103/60 111/71   Pulse: 96 104 97 85   Temp: 36.3 °C (97.3 °F) 36.7 °C (98.1 °F) 36.2 °C (97.2 °F) 36.2 °C (97.1 °F)   Resp: 16 16 16 16   Height:       Weight:       SpO2: 97% 93% 95% 96%     Physical Exam   Constitutional: She is oriented to person, place, and time. She appears well-developed and well-nourished. No distress.   HENT:   Head: " Normocephalic and atraumatic.   Eyes: Conjunctivae are normal. No scleral icterus.   Neck: Normal range of motion. Neck supple.   Cardiovascular: Normal rate and regular rhythm.    Pulmonary/Chest: Effort normal and breath sounds normal. No respiratory distress. She has no rales.   Abdominal: Soft. Bowel sounds are normal. There is no tenderness. There is no rebound and no guarding.   Musculoskeletal: Normal range of motion. She exhibits no edema.   Strength 3/5 throughout   Neurological: She is alert and oriented to person, place, and time.   Skin: Skin is warm and dry.   Vitals reviewed.   Lab Results   Component Value Date/Time    WBC 8.5 05/25/2017 03:05 AM    HEMOGLOBIN 11.3* 05/25/2017 03:05 AM    HEMATOCRIT 35.8* 05/25/2017 03:05 AM    PLATELET COUNT 299 05/25/2017 03:05 AM     Lab Results   Component Value Date/Time    SODIUM 141 05/25/2017 03:05 AM    POTASSIUM 4.2 05/25/2017 03:05 AM    CHLORIDE 113* 05/25/2017 03:05 AM    CO2 21 05/25/2017 03:05 AM    GLUCOSE 81 05/25/2017 03:05 AM    BUN 10 05/25/2017 03:05 AM    CREATININE 0.59 05/25/2017 03:05 AM      Assessment/Plan    * Status migrainosus (present on admission)  Assessment & Plan  -Cont diamox, PRN benadryl, fioricet, oxy, dilaudid for breakthrough pain  -LP 5/21- opening pressure 36, glucose 67, protein 38, WBC 4; improvement of HA noted  -Repeat LP for therapeutic purposes ordered  -CT brain- neg  -D/W Dr Oh- he has recommended that she see him OP, she had a LP shunt in the past that was marginally effective thus it was removed. Per NS no need for emergent procedure, may be f/u as OP in his office  -Appreciate second opinion Dr Berry per patient request- plan for  shut placement on Wednesday, LPs for HA in the mean time  -Appreciate neurology consult, Dr Ibarra- signed off    Anxiety (present on admission)  Assessment & Plan  Appreciate psychiatry consult, Dr Puga; denies SI at this time- monitor for signs of SI     Diarrhea (present on  admission)  Assessment & Plan  -Improving, chronic for many months per patient  -Low susp for infectious etiology without leukocytosis, abd pain, or fever  -Encourage PO hydration    LFT elevation (present on admission)  Assessment & Plan  -improving, suspect drug related  -US RUQ neg  -Trend  -If worsened stop methotrexate per rheumatology    Whole body pain (present on admission)  Assessment & Plan  -undergoing workup for autoimmune condition OP  -Cont home gabapentin, oxycodone    Nasal congestion (present on admission)  Assessment & Plan  saline rinse PRN    Rheumatoid arthritis (CMS-HCC) (present on admission)  Assessment & Plan   OP workup     Labs reviewed and Medications reviewed  Lopez catheter: No Lopez      DVT Prophylaxis: Enoxaparin (Lovenox)    Ulcer prophylaxis: Not indicated    Assessed for rehab: Patient was assess for and/or received rehabilitation services during this hospitalization

## 2017-05-26 NOTE — CARE PLAN
Problem: Pain Management  Goal: Pain level will decrease to patient’s comfort goal  Outcome: PROGRESSING AS EXPECTED  Patient reports a decreased level of pain.     Problem: Safety  Goal: Will remain free from injury  Outcome: PROGRESSING AS EXPECTED  Patient remains free from injury. Patient uses call light when necessary. Nursing staff assists with ambulation. Patient educated on injury prevention.   Goal: Will remain free from falls  Outcome: PROGRESSING AS EXPECTED  Fall precautions implemented. Treaded socks and refuses bed alarm. Patient remains free from falls.

## 2017-05-27 ENCOUNTER — APPOINTMENT (OUTPATIENT)
Dept: RADIOLOGY | Facility: MEDICAL CENTER | Age: 47
DRG: 032 | End: 2017-05-27
Attending: NURSE PRACTITIONER
Payer: MEDICARE

## 2017-05-27 LAB
ANION GAP SERPL CALC-SCNC: 5 MMOL/L (ref 0–11.9)
BACTERIA BLD CULT: NORMAL
BACTERIA BLD CULT: NORMAL
BUN SERPL-MCNC: 8 MG/DL (ref 8–22)
CALCIUM SERPL-MCNC: 8.9 MG/DL (ref 8.5–10.5)
CHLORIDE SERPL-SCNC: 110 MMOL/L (ref 96–112)
CO2 SERPL-SCNC: 22 MMOL/L (ref 20–33)
CREAT SERPL-MCNC: 0.53 MG/DL (ref 0.5–1.4)
ERYTHROCYTE [DISTWIDTH] IN BLOOD BY AUTOMATED COUNT: 52.7 FL (ref 35.9–50)
GFR SERPL CREATININE-BSD FRML MDRD: >60 ML/MIN/1.73 M 2
GLUCOSE SERPL-MCNC: 103 MG/DL (ref 65–99)
HCT VFR BLD AUTO: 37.1 % (ref 37–47)
HGB BLD-MCNC: 11.9 G/DL (ref 12–16)
MAGNESIUM SERPL-MCNC: 1.9 MG/DL (ref 1.5–2.5)
MCH RBC QN AUTO: 32.5 PG (ref 27–33)
MCHC RBC AUTO-ENTMCNC: 32.1 G/DL (ref 33.6–35)
MCV RBC AUTO: 101.4 FL (ref 81.4–97.8)
PLATELET # BLD AUTO: 360 K/UL (ref 164–446)
PMV BLD AUTO: 9.2 FL (ref 9–12.9)
POTASSIUM SERPL-SCNC: 3.7 MMOL/L (ref 3.6–5.5)
RBC # BLD AUTO: 3.66 M/UL (ref 4.2–5.4)
SIGNIFICANT IND 70042: NORMAL
SIGNIFICANT IND 70042: NORMAL
SITE SITE: NORMAL
SITE SITE: NORMAL
SODIUM SERPL-SCNC: 137 MMOL/L (ref 135–145)
SOURCE SOURCE: NORMAL
SOURCE SOURCE: NORMAL
WBC # BLD AUTO: 9.4 K/UL (ref 4.8–10.8)

## 2017-05-27 PROCEDURE — 85027 COMPLETE CBC AUTOMATED: CPT

## 2017-05-27 PROCEDURE — 62270 DX LMBR SPI PNXR: CPT

## 2017-05-27 PROCEDURE — 700111 HCHG RX REV CODE 636 W/ 250 OVERRIDE (IP): Performed by: HOSPITALIST

## 2017-05-27 PROCEDURE — 700101 HCHG RX REV CODE 250: Performed by: HOSPITALIST

## 2017-05-27 PROCEDURE — 009U3ZX DRAINAGE OF SPINAL CANAL, PERCUTANEOUS APPROACH, DIAGNOSTIC: ICD-10-PCS | Performed by: RADIOLOGY

## 2017-05-27 PROCEDURE — A9270 NON-COVERED ITEM OR SERVICE: HCPCS | Performed by: HOSPITALIST

## 2017-05-27 PROCEDURE — 80048 BASIC METABOLIC PNL TOTAL CA: CPT

## 2017-05-27 PROCEDURE — 99232 SBSQ HOSP IP/OBS MODERATE 35: CPT | Performed by: HOSPITALIST

## 2017-05-27 PROCEDURE — B01B1ZZ FLUOROSCOPY OF SPINAL CORD USING LOW OSMOLAR CONTRAST: ICD-10-PCS | Performed by: RADIOLOGY

## 2017-05-27 PROCEDURE — 700102 HCHG RX REV CODE 250 W/ 637 OVERRIDE(OP): Performed by: HOSPITALIST

## 2017-05-27 PROCEDURE — 700111 HCHG RX REV CODE 636 W/ 250 OVERRIDE (IP): Performed by: NURSE PRACTITIONER

## 2017-05-27 PROCEDURE — 770001 HCHG ROOM/CARE - MED/SURG/GYN PRIV*

## 2017-05-27 PROCEDURE — 83735 ASSAY OF MAGNESIUM: CPT

## 2017-05-27 PROCEDURE — A9270 NON-COVERED ITEM OR SERVICE: HCPCS | Performed by: PSYCHIATRY & NEUROLOGY

## 2017-05-27 PROCEDURE — A9270 NON-COVERED ITEM OR SERVICE: HCPCS | Performed by: NURSE PRACTITIONER

## 2017-05-27 PROCEDURE — 700102 HCHG RX REV CODE 250 W/ 637 OVERRIDE(OP): Performed by: NURSE PRACTITIONER

## 2017-05-27 PROCEDURE — 700102 HCHG RX REV CODE 250 W/ 637 OVERRIDE(OP): Performed by: PSYCHIATRY & NEUROLOGY

## 2017-05-27 PROCEDURE — 36415 COLL VENOUS BLD VENIPUNCTURE: CPT

## 2017-05-27 RX ADMIN — OXYCODONE HYDROCHLORIDE 10 MG: 10 TABLET ORAL at 09:50

## 2017-05-27 RX ADMIN — FERROUS GLUCONATE 324 MG: 324 TABLET ORAL at 08:33

## 2017-05-27 RX ADMIN — HYDROMORPHONE HYDROCHLORIDE 1 MG: 1 INJECTION, SOLUTION INTRAMUSCULAR; INTRAVENOUS; SUBCUTANEOUS at 20:41

## 2017-05-27 RX ADMIN — HYDROMORPHONE HYDROCHLORIDE 1 MG: 1 INJECTION, SOLUTION INTRAMUSCULAR; INTRAVENOUS; SUBCUTANEOUS at 02:35

## 2017-05-27 RX ADMIN — DIPHENHYDRAMINE HYDROCHLORIDE 50 MG: 50 INJECTION, SOLUTION INTRAMUSCULAR; INTRAVENOUS at 16:37

## 2017-05-27 RX ADMIN — HYDROMORPHONE HYDROCHLORIDE 1 MG: 1 INJECTION, SOLUTION INTRAMUSCULAR; INTRAVENOUS; SUBCUTANEOUS at 16:25

## 2017-05-27 RX ADMIN — SULFASALAZINE 1000 MG: 500 TABLET ORAL at 08:34

## 2017-05-27 RX ADMIN — DULOXETINE HYDROCHLORIDE 60 MG: 60 CAPSULE, DELAYED RELEASE ORAL at 19:55

## 2017-05-27 RX ADMIN — PREDNISONE 10 MG: 10 TABLET ORAL at 08:34

## 2017-05-27 RX ADMIN — RISPERIDONE 1 MG: 0.5 TABLET, FILM COATED ORAL at 19:55

## 2017-05-27 RX ADMIN — SULFASALAZINE 1000 MG: 500 TABLET ORAL at 19:55

## 2017-05-27 RX ADMIN — METOCLOPRAMIDE 10 MG: 5 INJECTION, SOLUTION INTRAMUSCULAR; INTRAVENOUS at 00:25

## 2017-05-27 RX ADMIN — GABAPENTIN 600 MG: 300 CAPSULE ORAL at 08:33

## 2017-05-27 RX ADMIN — GABAPENTIN 600 MG: 300 CAPSULE ORAL at 15:15

## 2017-05-27 RX ADMIN — FERROUS GLUCONATE 324 MG: 324 TABLET ORAL at 16:25

## 2017-05-27 RX ADMIN — FOLIC ACID 1 MG: 1 TABLET ORAL at 08:34

## 2017-05-27 RX ADMIN — LORAZEPAM 0.5 MG: 1 TABLET ORAL at 00:05

## 2017-05-27 RX ADMIN — DIPHENHYDRAMINE HYDROCHLORIDE 50 MG: 50 INJECTION, SOLUTION INTRAMUSCULAR; INTRAVENOUS at 05:31

## 2017-05-27 RX ADMIN — HYDROMORPHONE HYDROCHLORIDE 1 MG: 1 INJECTION, SOLUTION INTRAMUSCULAR; INTRAVENOUS; SUBCUTANEOUS at 06:32

## 2017-05-27 RX ADMIN — GABAPENTIN 600 MG: 300 CAPSULE ORAL at 19:55

## 2017-05-27 RX ADMIN — DULOXETINE HYDROCHLORIDE 60 MG: 60 CAPSULE, DELAYED RELEASE ORAL at 08:33

## 2017-05-27 RX ADMIN — OXYCODONE HYDROCHLORIDE 10 MG: 10 TABLET ORAL at 05:31

## 2017-05-27 RX ADMIN — ACETAZOLAMIDE 500 MG: 500 CAPSULE, EXTENDED RELEASE ORAL at 19:55

## 2017-05-27 RX ADMIN — OXYCODONE HYDROCHLORIDE 10 MG: 10 TABLET ORAL at 19:55

## 2017-05-27 RX ADMIN — Medication 500 MG: at 08:34

## 2017-05-27 RX ADMIN — OXYCODONE HYDROCHLORIDE 10 MG: 10 TABLET ORAL at 15:15

## 2017-05-27 RX ADMIN — ACETAZOLAMIDE 500 MG: 500 CAPSULE, EXTENDED RELEASE ORAL at 08:34

## 2017-05-27 RX ADMIN — POTASSIUM CHLORIDE AND SODIUM CHLORIDE: 900; 150 INJECTION, SOLUTION INTRAVENOUS at 16:36

## 2017-05-27 RX ADMIN — DIPHENHYDRAMINE HYDROCHLORIDE 50 MG: 50 INJECTION, SOLUTION INTRAMUSCULAR; INTRAVENOUS at 20:41

## 2017-05-27 RX ADMIN — RISPERIDONE 1 MG: 0.5 TABLET, FILM COATED ORAL at 08:33

## 2017-05-27 RX ADMIN — Medication 500 MG: at 16:25

## 2017-05-27 RX ADMIN — HYDROMORPHONE HYDROCHLORIDE 1 MG: 1 INJECTION, SOLUTION INTRAMUSCULAR; INTRAVENOUS; SUBCUTANEOUS at 10:35

## 2017-05-27 ASSESSMENT — PAIN SCALES - GENERAL
PAINLEVEL_OUTOF10: 8
PAINLEVEL_OUTOF10: 9
PAINLEVEL_OUTOF10: 8

## 2017-05-27 ASSESSMENT — ENCOUNTER SYMPTOMS
ABDOMINAL PAIN: 0
WEAKNESS: 1
FEVER: 0
DIZZINESS: 0
CHILLS: 0
PALPITATIONS: 0
DIARRHEA: 0
MYALGIAS: 0
SHORTNESS OF BREATH: 0
COUGH: 0
NAUSEA: 0
CONSTIPATION: 0
HEADACHES: 1
FOCAL WEAKNESS: 0
VOMITING: 0

## 2017-05-27 NOTE — PROGRESS NOTES
Pt states migraine pressure has improved since LP today, but states still has a tension migraine at this time. Will medicate prn per mar, offer nonpharm pain interventions. Will continue to monitor.

## 2017-05-27 NOTE — PROGRESS NOTES
Pt refusing bed alarm at this time. Educated pt fall risk and safety with bed alarm use. Pt continues to refuse. Educated pt to use call light prior to getting up OOB. Hourly rounding in place.  present at bedside.

## 2017-05-27 NOTE — PROGRESS NOTES
Assumed care at this time. Report received from CAROLINA Shaikh. Pt laying in bed asleep, no acute distress noted, respirations even and unlabored. Side rails up x2, call light within reach, nonslip footwear on, bed locked/ placed in lowest position, hourly rounding in place. Will continue to monitor.

## 2017-05-27 NOTE — PROGRESS NOTES
Hospital Medicine ICU Interval Note    Date of Service: 2017    Chief Complaint  Enedelia Pang is a 46 y.o. female w/ h/o pseudotumor cerebri, CVA, prior  shunt with infection thus removal admitted 2017 for migraine headache.     Interval Problem Update   - no complaints today, headache fairly well managed today     Consultants/Specialty  Leppla, surgery  Keyvani, neurology    Disposition  OR on         Review of Systems   Constitutional: Negative for fever and chills.   Respiratory: Negative for cough and shortness of breath.    Cardiovascular: Negative for chest pain and palpitations.   Gastrointestinal: Negative for nausea, vomiting, abdominal pain, diarrhea and constipation.   Genitourinary: Negative for dysuria.   Musculoskeletal: Negative for myalgias.   Skin: Negative for itching.   Neurological: Positive for weakness and headaches. Negative for dizziness and focal weakness.   All other systems reviewed and are negative.     Physical Exam  Laboratory/Imaging   Hemodynamics  Temp (24hrs), Av.6 °C (97.9 °F), Min:36.3 °C (97.4 °F), Max:36.9 °C (98.5 °F)   Temperature: 36.6 °C (97.8 °F)  Pulse  Av.3  Min: 78  Max: 150   Blood Pressure: 116/71 mmHg      Respiratory      Respiration: 18, Pulse Oximetry: 94 %             Fluids       Nutrition  Orders Placed This Encounter   Procedures   • Diet Order     Standing Status: Standing      Number of Occurrences: 1      Standing Expiration Date:      Order Specific Question:  Diet:     Answer:  Regular [1]     Physical Exam   Constitutional: She appears well-developed and well-nourished.   HENT:   Head: Normocephalic and atraumatic.   Mouth/Throat: Oropharynx is clear and moist.   Eyes: Conjunctivae are normal. Pupils are equal, round, and reactive to light. No scleral icterus.   Cardiovascular: Normal rate, regular rhythm, normal heart sounds and intact distal pulses.    No murmur heard.  Pulmonary/Chest: Effort normal and breath sounds  normal. No respiratory distress. She has no wheezes.   Abdominal: Soft. Bowel sounds are normal. She exhibits no distension. There is no tenderness.   Musculoskeletal: Normal range of motion. She exhibits no edema or tenderness.   Neurological: She is alert.   Vitals reviewed.      Recent Labs      05/25/17   0305  05/27/17   0256   WBC  8.5  9.4   RBC  3.46*  3.66*   HEMOGLOBIN  11.3*  11.9*   HEMATOCRIT  35.8*  37.1   MCV  103.5*  101.4*   MCH  32.7  32.5   MCHC  31.6*  32.1*   RDW  54.0*  52.7*   PLATELETCT  299  360   MPV  10.2  9.2     Recent Labs      05/25/17   0305  05/27/17   0255   SODIUM  141  137   POTASSIUM  4.2  3.7   CHLORIDE  113*  110   CO2  21  22   GLUCOSE  81  103*   BUN  10  8   CREATININE  0.59  0.53   CALCIUM  8.3*  8.9     Recent Labs      05/25/17   1044  05/26/17   0405   APTT   --   25.8   INR  0.93   --                   Assessment/Plan     * Status migrainosus (present on admission)  Assessment & Plan  Diamox, PRN benadryl, fioricet, oxy, dilaudid for breakthrough pain  LP 5/21- opening pressure 36, glucose 67, protein 38, WBC 4; improvement of HA noted  Repeat LP for therapeutic purposes ordered  CT brain- neg  D/W Dr Oh- he has recommended that she see him OP, she had a LP shunt in the past that was marginally effective thus it was removed. Per NS no need for emergent procedure, may be f/u as OP in his office  Appreciate second opinion Dr Berry per patient request- plan for  shut placement on Wednesday, LPs for HA in the mean time  Appreciate neurology consult, Dr Ibarra- signed off    Anxiety (present on admission)  Assessment & Plan  Appreciate psychiatry consult, Dr Puga; denies SI at this time- monitor for signs of SI     Diarrhea (present on admission)  Assessment & Plan  Chronic, no acute management    LFT elevation (present on admission)  Assessment & Plan  Trending  If worsened stop methotrexate per rheumatology    Whole body pain (present on admission)  Assessment &  Plan  Outpatient w/u    Rheumatoid arthritis (CMS-HCC) (present on admission)  Assessment & Plan   OP workup      Labs reviewed and Medications reviewed        DVT Prophylaxis: Enoxaparin (Lovenox)    Ulcer prophylaxis: Not indicated

## 2017-05-27 NOTE — CARE PLAN
Problem: Pain Management  Goal: Pain level will decrease to patient’s comfort goal  Outcome: PROGRESSING SLOWER THAN EXPECTED  Monitor pain levels, medicate prn per mar, offer nonpharm pain interventions    Problem: Safety  Goal: Will remain free from injury  Outcome: PROGRESSING AS EXPECTED  Hourly rounding, call light within reach, nonslip footwear on, bed locked/ placed in lowest position, side rails up x2

## 2017-05-28 PROCEDURE — 700111 HCHG RX REV CODE 636 W/ 250 OVERRIDE (IP): Performed by: NURSE PRACTITIONER

## 2017-05-28 PROCEDURE — 700102 HCHG RX REV CODE 250 W/ 637 OVERRIDE(OP): Performed by: INTERNAL MEDICINE

## 2017-05-28 PROCEDURE — 700102 HCHG RX REV CODE 250 W/ 637 OVERRIDE(OP): Performed by: PSYCHIATRY & NEUROLOGY

## 2017-05-28 PROCEDURE — 770001 HCHG ROOM/CARE - MED/SURG/GYN PRIV*

## 2017-05-28 PROCEDURE — 700102 HCHG RX REV CODE 250 W/ 637 OVERRIDE(OP): Performed by: NURSE PRACTITIONER

## 2017-05-28 PROCEDURE — 700111 HCHG RX REV CODE 636 W/ 250 OVERRIDE (IP): Performed by: HOSPITALIST

## 2017-05-28 PROCEDURE — A9270 NON-COVERED ITEM OR SERVICE: HCPCS | Performed by: INTERNAL MEDICINE

## 2017-05-28 PROCEDURE — A9270 NON-COVERED ITEM OR SERVICE: HCPCS | Performed by: PSYCHIATRY & NEUROLOGY

## 2017-05-28 PROCEDURE — A9270 NON-COVERED ITEM OR SERVICE: HCPCS | Performed by: HOSPITALIST

## 2017-05-28 PROCEDURE — 700102 HCHG RX REV CODE 250 W/ 637 OVERRIDE(OP): Performed by: HOSPITALIST

## 2017-05-28 PROCEDURE — A9270 NON-COVERED ITEM OR SERVICE: HCPCS | Performed by: NURSE PRACTITIONER

## 2017-05-28 PROCEDURE — 99232 SBSQ HOSP IP/OBS MODERATE 35: CPT | Performed by: HOSPITALIST

## 2017-05-28 RX ADMIN — HYDROMORPHONE HYDROCHLORIDE 1 MG: 1 INJECTION, SOLUTION INTRAMUSCULAR; INTRAVENOUS; SUBCUTANEOUS at 14:27

## 2017-05-28 RX ADMIN — DIPHENHYDRAMINE HYDROCHLORIDE 50 MG: 50 INJECTION, SOLUTION INTRAMUSCULAR; INTRAVENOUS at 06:13

## 2017-05-28 RX ADMIN — GABAPENTIN 600 MG: 300 CAPSULE ORAL at 08:45

## 2017-05-28 RX ADMIN — DULOXETINE HYDROCHLORIDE 60 MG: 60 CAPSULE, DELAYED RELEASE ORAL at 08:46

## 2017-05-28 RX ADMIN — HYDROMORPHONE HYDROCHLORIDE 1 MG: 1 INJECTION, SOLUTION INTRAMUSCULAR; INTRAVENOUS; SUBCUTANEOUS at 00:32

## 2017-05-28 RX ADMIN — ZOLPIDEM TARTRATE 10 MG: 5 TABLET, FILM COATED ORAL at 22:35

## 2017-05-28 RX ADMIN — FOLIC ACID 1 MG: 1 TABLET ORAL at 08:45

## 2017-05-28 RX ADMIN — PREDNISONE 10 MG: 10 TABLET ORAL at 08:46

## 2017-05-28 RX ADMIN — OXYCODONE HYDROCHLORIDE 10 MG: 10 TABLET ORAL at 17:22

## 2017-05-28 RX ADMIN — FERROUS GLUCONATE 324 MG: 324 TABLET ORAL at 17:22

## 2017-05-28 RX ADMIN — Medication 500 MG: at 06:12

## 2017-05-28 RX ADMIN — ACETAZOLAMIDE 500 MG: 500 CAPSULE, EXTENDED RELEASE ORAL at 08:46

## 2017-05-28 RX ADMIN — OXYCODONE HYDROCHLORIDE 10 MG: 10 TABLET ORAL at 08:45

## 2017-05-28 RX ADMIN — OXYCODONE HYDROCHLORIDE 10 MG: 10 TABLET ORAL at 04:36

## 2017-05-28 RX ADMIN — SULFASALAZINE 1000 MG: 500 TABLET ORAL at 08:45

## 2017-05-28 RX ADMIN — ACETAZOLAMIDE 500 MG: 500 CAPSULE, EXTENDED RELEASE ORAL at 20:19

## 2017-05-28 RX ADMIN — RISPERIDONE 1 MG: 0.5 TABLET, FILM COATED ORAL at 08:46

## 2017-05-28 RX ADMIN — ENOXAPARIN SODIUM 40 MG: 100 INJECTION SUBCUTANEOUS at 08:45

## 2017-05-28 RX ADMIN — FERROUS GLUCONATE 324 MG: 324 TABLET ORAL at 06:12

## 2017-05-28 RX ADMIN — DULOXETINE HYDROCHLORIDE 60 MG: 60 CAPSULE, DELAYED RELEASE ORAL at 20:18

## 2017-05-28 RX ADMIN — OXYCODONE HYDROCHLORIDE 10 MG: 10 TABLET ORAL at 12:46

## 2017-05-28 RX ADMIN — SULFASALAZINE 1000 MG: 500 TABLET ORAL at 20:18

## 2017-05-28 RX ADMIN — GABAPENTIN 600 MG: 300 CAPSULE ORAL at 14:26

## 2017-05-28 RX ADMIN — HYDROMORPHONE HYDROCHLORIDE 1 MG: 1 INJECTION, SOLUTION INTRAMUSCULAR; INTRAVENOUS; SUBCUTANEOUS at 22:35

## 2017-05-28 RX ADMIN — DIPHENHYDRAMINE HYDROCHLORIDE 50 MG: 50 INJECTION, SOLUTION INTRAMUSCULAR; INTRAVENOUS at 20:18

## 2017-05-28 RX ADMIN — DIPHENHYDRAMINE HYDROCHLORIDE 50 MG: 50 INJECTION, SOLUTION INTRAMUSCULAR; INTRAVENOUS at 12:46

## 2017-05-28 RX ADMIN — RISPERIDONE 1 MG: 0.5 TABLET, FILM COATED ORAL at 20:18

## 2017-05-28 RX ADMIN — GABAPENTIN 600 MG: 300 CAPSULE ORAL at 20:18

## 2017-05-28 RX ADMIN — HYDROMORPHONE HYDROCHLORIDE 1 MG: 1 INJECTION, SOLUTION INTRAMUSCULAR; INTRAVENOUS; SUBCUTANEOUS at 10:24

## 2017-05-28 RX ADMIN — HYDROMORPHONE HYDROCHLORIDE 1 MG: 1 INJECTION, SOLUTION INTRAMUSCULAR; INTRAVENOUS; SUBCUTANEOUS at 06:13

## 2017-05-28 RX ADMIN — Medication 500 MG: at 17:22

## 2017-05-28 RX ADMIN — HYDROMORPHONE HYDROCHLORIDE 1 MG: 1 INJECTION, SOLUTION INTRAMUSCULAR; INTRAVENOUS; SUBCUTANEOUS at 18:22

## 2017-05-28 ASSESSMENT — ENCOUNTER SYMPTOMS
VOMITING: 0
SHORTNESS OF BREATH: 0
FOCAL WEAKNESS: 0
FEVER: 0
NAUSEA: 0
DIARRHEA: 0
WEAKNESS: 1
PALPITATIONS: 0
DIZZINESS: 0
CONSTIPATION: 0
HEADACHES: 1
MYALGIAS: 0
CHILLS: 0
ABDOMINAL PAIN: 0
COUGH: 0

## 2017-05-28 ASSESSMENT — PAIN SCALES - GENERAL
PAINLEVEL_OUTOF10: 7
PAINLEVEL_OUTOF10: 8
PAINLEVEL_OUTOF10: 7

## 2017-05-28 NOTE — CARE PLAN
Problem: Pain Management  Goal: Pain level will decrease to patient’s comfort goal  Outcome: PROGRESSING SLOWER THAN EXPECTED  Monitor pain levels,m edicate prn per mar, offer nonpharm pain interventions    Problem: Safety  Goal: Will remain free from injury  Outcome: PROGRESSING AS EXPECTED  Hourly rounding, call light within reach, nonslip footwear on, bed locked/ placed in lowest position, side rails up x2

## 2017-05-28 NOTE — PROGRESS NOTES
Pt refuses bed alarm. Education was provided. Pt verbalized understanding and demonstrates that she is able to ambulate without assistance. Will continue to monitor.

## 2017-05-28 NOTE — PROGRESS NOTES
Pt refused bed alarm. Pt educated about safety and importance of bed alarm to prevent falls. Pt still refused bed alarm. Call light within reach, bed locked/ placed in lowest position, side rails up x2, nonslip footwear on. Pt educated to call before getting up. Hourly rounding in place.

## 2017-05-28 NOTE — PROGRESS NOTES
Assumed care at this time. Pt laying in bed asleep, no acute distress noted, respirations even and unlabored. Side rails up x2, nonslip footwear on, call light within reach, bed locked/ placed in lowest position, hourly rounding in place. Will continue to monitor.

## 2017-05-28 NOTE — OR SURGEON
Immediate Post- Operative Note        PostOp Diagnosis: Psuedotumor cerebri      Procedure(s): lumbar puncture      Estimated Blood Loss: Less than 5 ml        Complications: None            5/27/2017     5:37 PM     Nael Dowd

## 2017-05-28 NOTE — PROGRESS NOTES
Hospital Medicine ICU Interval Note    Date of Service: 2017    Chief Complaint  Enedelia Pang is a 46 y.o. female w/ h/o pseudotumor cerebri, CVA, prior  shunt with infection thus removal admitted 2017 for migraine headache.     Interval Problem Update   - questions answered, has an ice hat that seems to help her headache today, 3/10 in severity. Discussed plan with patient and  at bedside.      - no complaints today, headache fairly well managed today     Consultants/Specialty  Leppla, surgery  Keyvani, neurology    Disposition  OR on         Review of Systems   Constitutional: Negative for fever and chills.   Respiratory: Negative for cough and shortness of breath.    Cardiovascular: Negative for chest pain and palpitations.   Gastrointestinal: Negative for nausea, vomiting, abdominal pain, diarrhea and constipation.   Genitourinary: Negative for dysuria.   Musculoskeletal: Negative for myalgias.   Skin: Negative for itching.   Neurological: Positive for weakness and headaches. Negative for dizziness and focal weakness.   All other systems reviewed and are negative.     Physical Exam  Laboratory/Imaging   Hemodynamics  Temp (24hrs), Av.4 °C (97.5 °F), Min:36.2 °C (97.1 °F), Max:36.5 °C (97.7 °F)   Temperature: 36.5 °C (97.7 °F)  Pulse  Av.2  Min: 78  Max: 150   Blood Pressure: 105/75 mmHg      Respiratory      Respiration: 18, Pulse Oximetry: 94 %             Fluids       Nutrition  Orders Placed This Encounter   Procedures   • Diet Order     Standing Status: Standing      Number of Occurrences: 1      Standing Expiration Date:      Order Specific Question:  Diet:     Answer:  Regular [1]     Physical Exam   Constitutional: She appears well-developed and well-nourished.   HENT:   Head: Normocephalic and atraumatic.   Mouth/Throat: Oropharynx is clear and moist.   Eyes: Conjunctivae are normal. Pupils are equal, round, and reactive to light. No scleral icterus.    Cardiovascular: Normal rate, regular rhythm, normal heart sounds and intact distal pulses.    No murmur heard.  Pulmonary/Chest: Effort normal and breath sounds normal. No respiratory distress. She has no wheezes.   Abdominal: Soft. Bowel sounds are normal. She exhibits no distension. There is no tenderness.   Musculoskeletal: Normal range of motion. She exhibits no edema or tenderness.   Neurological: She is alert.   Vitals reviewed.      Recent Labs      05/27/17   0256   WBC  9.4   RBC  3.66*   HEMOGLOBIN  11.9*   HEMATOCRIT  37.1   MCV  101.4*   MCH  32.5   MCHC  32.1*   RDW  52.7*   PLATELETCT  360   MPV  9.2     Recent Labs      05/27/17   0255   SODIUM  137   POTASSIUM  3.7   CHLORIDE  110   CO2  22   GLUCOSE  103*   BUN  8   CREATININE  0.53   CALCIUM  8.9     Recent Labs      05/25/17   1044  05/26/17   0405   APTT   --   25.8   INR  0.93   --                   Assessment/Plan     * Status migrainosus (present on admission)  Assessment & Plan  Diamox, PRN benadryl, fioricet, oxy, dilaudid for breakthrough pain  LP 5/21- opening pressure 36, glucose 67, protein 38, WBC 4; improvement of HA noted  Repeat LP for therapeutic purposes ordered  CT brain- neg  D/W Dr Oh- he has recommended that she see him OP, she had a LP shunt in the past that was marginally effective thus it was removed. Per NS no need for emergent procedure, may be f/u as OP in his office  Appreciate second opinion Dr Berry per patient request- plan for  shut placement on Wednesday, LPs for HA in the mean time  Appreciate neurology consult, Dr Ibarra- signed off    Anxiety (present on admission)  Assessment & Plan  Appreciate psychiatry consult, Dr Puga; denies SI at this time- monitor for signs of SI     Diarrhea (present on admission)  Assessment & Plan  Chronic, no acute management    LFT elevation (present on admission)  Assessment & Plan  Trending  If worsened stop methotrexate per rheumatology    Whole body pain (present on  admission)  Assessment & Plan  Outpatient w/u    Rheumatoid arthritis (CMS-HCC) (present on admission)  Assessment & Plan   OP workup      Labs reviewed and Medications reviewed        DVT Prophylaxis: Enoxaparin (Lovenox)    Ulcer prophylaxis: Not indicated

## 2017-05-28 NOTE — PROGRESS NOTES
recieved report from off going nurse. Nurse stated no sig med event during shift. Pt has c/o pain, will medicate per orders. Family at bedside. Vss. Will continue to monitor.. Pt refuses bed alarm

## 2017-05-29 PROCEDURE — 700111 HCHG RX REV CODE 636 W/ 250 OVERRIDE (IP): Performed by: NURSE PRACTITIONER

## 2017-05-29 PROCEDURE — A9270 NON-COVERED ITEM OR SERVICE: HCPCS | Performed by: INTERNAL MEDICINE

## 2017-05-29 PROCEDURE — 700102 HCHG RX REV CODE 250 W/ 637 OVERRIDE(OP): Performed by: INTERNAL MEDICINE

## 2017-05-29 PROCEDURE — 99232 SBSQ HOSP IP/OBS MODERATE 35: CPT | Performed by: HOSPITALIST

## 2017-05-29 PROCEDURE — A9270 NON-COVERED ITEM OR SERVICE: HCPCS | Performed by: NURSE PRACTITIONER

## 2017-05-29 PROCEDURE — A9270 NON-COVERED ITEM OR SERVICE: HCPCS | Performed by: HOSPITALIST

## 2017-05-29 PROCEDURE — 700111 HCHG RX REV CODE 636 W/ 250 OVERRIDE (IP): Performed by: HOSPITALIST

## 2017-05-29 PROCEDURE — 700102 HCHG RX REV CODE 250 W/ 637 OVERRIDE(OP): Performed by: NURSE PRACTITIONER

## 2017-05-29 PROCEDURE — 700102 HCHG RX REV CODE 250 W/ 637 OVERRIDE(OP): Performed by: HOSPITALIST

## 2017-05-29 PROCEDURE — A9270 NON-COVERED ITEM OR SERVICE: HCPCS | Performed by: PSYCHIATRY & NEUROLOGY

## 2017-05-29 PROCEDURE — 700102 HCHG RX REV CODE 250 W/ 637 OVERRIDE(OP): Performed by: PSYCHIATRY & NEUROLOGY

## 2017-05-29 PROCEDURE — 770001 HCHG ROOM/CARE - MED/SURG/GYN PRIV*

## 2017-05-29 RX ORDER — POLYETHYLENE GLYCOL 3350 17 G/17G
1 POWDER, FOR SOLUTION ORAL
Status: DISCONTINUED | OUTPATIENT
Start: 2017-05-29 | End: 2017-05-31

## 2017-05-29 RX ORDER — AMOXICILLIN 250 MG
2 CAPSULE ORAL 2 TIMES DAILY
Status: DISCONTINUED | OUTPATIENT
Start: 2017-05-29 | End: 2017-05-31

## 2017-05-29 RX ORDER — BISACODYL 10 MG
10 SUPPOSITORY, RECTAL RECTAL
Status: DISCONTINUED | OUTPATIENT
Start: 2017-05-29 | End: 2017-05-31

## 2017-05-29 RX ADMIN — STANDARDIZED SENNA CONCENTRATE AND DOCUSATE SODIUM 2 TABLET: 8.6; 5 TABLET, FILM COATED ORAL at 20:04

## 2017-05-29 RX ADMIN — HYDROMORPHONE HYDROCHLORIDE 1 MG: 1 INJECTION, SOLUTION INTRAMUSCULAR; INTRAVENOUS; SUBCUTANEOUS at 20:00

## 2017-05-29 RX ADMIN — Medication 500 MG: at 17:32

## 2017-05-29 RX ADMIN — RISPERIDONE 1 MG: 0.5 TABLET, FILM COATED ORAL at 07:48

## 2017-05-29 RX ADMIN — OXYCODONE HYDROCHLORIDE 10 MG: 10 TABLET ORAL at 21:59

## 2017-05-29 RX ADMIN — HYDROMORPHONE HYDROCHLORIDE 1 MG: 1 INJECTION, SOLUTION INTRAMUSCULAR; INTRAVENOUS; SUBCUTANEOUS at 15:49

## 2017-05-29 RX ADMIN — FERROUS GLUCONATE 324 MG: 324 TABLET ORAL at 17:32

## 2017-05-29 RX ADMIN — DULOXETINE HYDROCHLORIDE 60 MG: 60 CAPSULE, DELAYED RELEASE ORAL at 20:05

## 2017-05-29 RX ADMIN — FERROUS GLUCONATE 324 MG: 324 TABLET ORAL at 05:33

## 2017-05-29 RX ADMIN — ENOXAPARIN SODIUM 40 MG: 100 INJECTION SUBCUTANEOUS at 07:48

## 2017-05-29 RX ADMIN — PREDNISONE 10 MG: 10 TABLET ORAL at 07:47

## 2017-05-29 RX ADMIN — RISPERIDONE 1 MG: 0.5 TABLET, FILM COATED ORAL at 20:04

## 2017-05-29 RX ADMIN — DULOXETINE HYDROCHLORIDE 60 MG: 60 CAPSULE, DELAYED RELEASE ORAL at 07:48

## 2017-05-29 RX ADMIN — GABAPENTIN 600 MG: 300 CAPSULE ORAL at 07:47

## 2017-05-29 RX ADMIN — HYDROMORPHONE HYDROCHLORIDE 1 MG: 1 INJECTION, SOLUTION INTRAMUSCULAR; INTRAVENOUS; SUBCUTANEOUS at 11:40

## 2017-05-29 RX ADMIN — DIPHENHYDRAMINE HYDROCHLORIDE 50 MG: 50 INJECTION, SOLUTION INTRAMUSCULAR; INTRAVENOUS at 11:48

## 2017-05-29 RX ADMIN — DIPHENHYDRAMINE HYDROCHLORIDE 50 MG: 50 INJECTION, SOLUTION INTRAMUSCULAR; INTRAVENOUS at 19:56

## 2017-05-29 RX ADMIN — HYDROMORPHONE HYDROCHLORIDE 1 MG: 1 INJECTION, SOLUTION INTRAMUSCULAR; INTRAVENOUS; SUBCUTANEOUS at 07:41

## 2017-05-29 RX ADMIN — SULFASALAZINE 1000 MG: 500 TABLET ORAL at 20:05

## 2017-05-29 RX ADMIN — ZOLPIDEM TARTRATE 10 MG: 5 TABLET, FILM COATED ORAL at 20:05

## 2017-05-29 RX ADMIN — GABAPENTIN 600 MG: 300 CAPSULE ORAL at 14:54

## 2017-05-29 RX ADMIN — SULFASALAZINE 1000 MG: 500 TABLET ORAL at 07:51

## 2017-05-29 RX ADMIN — ACETAZOLAMIDE 500 MG: 500 CAPSULE, EXTENDED RELEASE ORAL at 20:05

## 2017-05-29 RX ADMIN — HYDROMORPHONE HYDROCHLORIDE 1 MG: 1 INJECTION, SOLUTION INTRAMUSCULAR; INTRAVENOUS; SUBCUTANEOUS at 03:01

## 2017-05-29 RX ADMIN — FOLIC ACID 1 MG: 1 TABLET ORAL at 07:48

## 2017-05-29 RX ADMIN — LORAZEPAM 0.5 MG: 1 TABLET ORAL at 17:48

## 2017-05-29 RX ADMIN — Medication 500 MG: at 05:33

## 2017-05-29 RX ADMIN — STANDARDIZED SENNA CONCENTRATE AND DOCUSATE SODIUM 2 TABLET: 8.6; 5 TABLET, FILM COATED ORAL at 10:18

## 2017-05-29 RX ADMIN — GABAPENTIN 600 MG: 300 CAPSULE ORAL at 20:04

## 2017-05-29 RX ADMIN — ACETAZOLAMIDE 500 MG: 500 CAPSULE, EXTENDED RELEASE ORAL at 07:51

## 2017-05-29 RX ADMIN — HYDROMORPHONE HYDROCHLORIDE 1 MG: 1 INJECTION, SOLUTION INTRAMUSCULAR; INTRAVENOUS; SUBCUTANEOUS at 23:59

## 2017-05-29 RX ADMIN — DIPHENHYDRAMINE HYDROCHLORIDE 50 MG: 50 INJECTION, SOLUTION INTRAMUSCULAR; INTRAVENOUS at 05:35

## 2017-05-29 ASSESSMENT — ENCOUNTER SYMPTOMS
NAUSEA: 0
CONSTIPATION: 0
FOCAL WEAKNESS: 0
HEADACHES: 1
DIARRHEA: 0
VOMITING: 0
PALPITATIONS: 0
ABDOMINAL PAIN: 0
CHILLS: 0
WEAKNESS: 1
SHORTNESS OF BREATH: 0
DIZZINESS: 0
COUGH: 0
MYALGIAS: 0
FEVER: 0

## 2017-05-29 ASSESSMENT — LIFESTYLE VARIABLES: DO YOU DRINK ALCOHOL: NO

## 2017-05-29 ASSESSMENT — PAIN SCALES - GENERAL
PAINLEVEL_OUTOF10: 8
PAINLEVEL_OUTOF10: 9
PAINLEVEL_OUTOF10: 7
PAINLEVEL_OUTOF10: 3
PAINLEVEL_OUTOF10: 8
PAINLEVEL_OUTOF10: 6
PAINLEVEL_OUTOF10: 6
PAINLEVEL_OUTOF10: 9
PAINLEVEL_OUTOF10: 8

## 2017-05-29 NOTE — PROGRESS NOTES
Received report from off going nurse. Nurse stated no sig med event during shift. Pt has c/o pain at this time, will medicate per orders. Pt refuses bed alarm. Family at bedside. Vss. Will continue to monitor.

## 2017-05-29 NOTE — PROGRESS NOTES
Hospital Medicine ICU Interval Note    Date of Service: 2017    Chief Complaint  Enedelia Pang is a 46 y.o. female w/ h/o pseudotumor cerebri, CVA, prior  shunt with infection thus removal admitted 2017 for migraine headache.     Interval Problem Update   - headache a little less today; anxious for surgery today. Questions answered. Discussed with patient and  at length.      - questions answered, has an ice hat that seems to help her headache today, 3/10 in severity. Discussed plan with patient and  at bedside.     Consultants/Specialty  Leppla, surgery  Keyvani, neurology    Disposition  OR on         Review of Systems   Constitutional: Negative for fever and chills.   Respiratory: Negative for cough and shortness of breath.    Cardiovascular: Negative for chest pain and palpitations.   Gastrointestinal: Negative for nausea, vomiting, abdominal pain, diarrhea and constipation.   Genitourinary: Negative for dysuria.   Musculoskeletal: Negative for myalgias.   Skin: Negative for itching.   Neurological: Positive for weakness and headaches. Negative for dizziness and focal weakness.   All other systems reviewed and are negative.     Physical Exam  Laboratory/Imaging   Hemodynamics  Temp (24hrs), Av.6 °C (97.8 °F), Min:36.4 °C (97.5 °F), Max:36.8 °C (98.3 °F)   Temperature: 36.4 °C (97.5 °F)  Pulse  Av.3  Min: 78  Max: 150   Blood Pressure: 112/65 mmHg      Respiratory      Respiration: 16, Pulse Oximetry: 94 %             Fluids       Nutrition  Orders Placed This Encounter   Procedures   • Diet Order     Standing Status: Standing      Number of Occurrences: 1      Standing Expiration Date:      Order Specific Question:  Diet:     Answer:  Regular [1]     Physical Exam   Constitutional: She appears well-developed and well-nourished.   HENT:   Head: Normocephalic and atraumatic.   Mouth/Throat: Oropharynx is clear and moist.   Eyes: Conjunctivae are normal. Pupils are  equal, round, and reactive to light. No scleral icterus.   Cardiovascular: Normal rate, regular rhythm, normal heart sounds and intact distal pulses.    No murmur heard.  Pulmonary/Chest: Effort normal and breath sounds normal. No respiratory distress. She has no wheezes.   Abdominal: Soft. Bowel sounds are normal. She exhibits no distension. There is no tenderness.   Musculoskeletal: Normal range of motion. She exhibits no edema or tenderness.   Neurological: She is alert.   Vitals reviewed.      Recent Labs      05/27/17   0256   WBC  9.4   RBC  3.66*   HEMOGLOBIN  11.9*   HEMATOCRIT  37.1   MCV  101.4*   MCH  32.5   MCHC  32.1*   RDW  52.7*   PLATELETCT  360   MPV  9.2     Recent Labs      05/27/17   0255   SODIUM  137   POTASSIUM  3.7   CHLORIDE  110   CO2  22   GLUCOSE  103*   BUN  8   CREATININE  0.53   CALCIUM  8.9                      Assessment/Plan     * Status migrainosus (present on admission)  Assessment & Plan  Diamox, PRN benadryl, fioricet, oxy, dilaudid for breakthrough pain  LP 5/21- opening pressure 36, glucose 67, protein 38, WBC 4; improvement of HA noted  Repeat LP for therapeutic purposes ordered  CT brain- neg  D/W Dr Oh- he has recommended that she see him OP, she had a LP shunt in the past that was marginally effective thus it was removed. Per NS no need for emergent procedure, may be f/u as OP in his office  Appreciate second opinion Dr Berry per patient request- plan for  shut placement on Wednesday, LPs for HA in the mean time  Appreciate neurology consult, Dr Ibarra- signed off    Anxiety (present on admission)  Assessment & Plan  Appreciate psychiatry consult, Dr Puga; denies SI at this time- monitor for signs of SI     Diarrhea (present on admission)  Assessment & Plan  Chronic, no acute management    LFT elevation (present on admission)  Assessment & Plan  Trending  If worsened stop methotrexate per rheumatology    Whole body pain (present on admission)  Assessment &  Plan  Outpatient w/u    Rheumatoid arthritis (CMS-HCC) (present on admission)  Assessment & Plan   OP workup      Labs reviewed and Medications reviewed        DVT Prophylaxis: Enoxaparin (Lovenox)    Ulcer prophylaxis: Not indicated

## 2017-05-30 LAB
CFT BLD TEG: 4.8 MIN (ref 5–10)
CLOT ANGLE BLD TEG: 70.8 DEGREES (ref 53–72)
CLOT LYSIS 30M P MA LENFR BLD TEG: 1.5 % (ref 0–8)
CT.EXTRINSIC BLD ROTEM: 1.3 MIN (ref 1–3)
MCF BLD TEG: 72.3 MM (ref 50–70)
PA AA BLD-ACNC: 3.2 %
PA ADP BLD-ACNC: 47.3 %
TEG ALGORITHM TGALG: ABNORMAL

## 2017-05-30 PROCEDURE — 770001 HCHG ROOM/CARE - MED/SURG/GYN PRIV*

## 2017-05-30 PROCEDURE — 700102 HCHG RX REV CODE 250 W/ 637 OVERRIDE(OP): Performed by: NURSE PRACTITIONER

## 2017-05-30 PROCEDURE — 700102 HCHG RX REV CODE 250 W/ 637 OVERRIDE(OP): Performed by: HOSPITALIST

## 2017-05-30 PROCEDURE — 700111 HCHG RX REV CODE 636 W/ 250 OVERRIDE (IP): Performed by: NURSE PRACTITIONER

## 2017-05-30 PROCEDURE — A9270 NON-COVERED ITEM OR SERVICE: HCPCS | Performed by: HOSPITALIST

## 2017-05-30 PROCEDURE — 99233 SBSQ HOSP IP/OBS HIGH 50: CPT | Performed by: HOSPITALIST

## 2017-05-30 PROCEDURE — 85576 BLOOD PLATELET AGGREGATION: CPT

## 2017-05-30 PROCEDURE — 700102 HCHG RX REV CODE 250 W/ 637 OVERRIDE(OP): Performed by: PSYCHIATRY & NEUROLOGY

## 2017-05-30 PROCEDURE — A9270 NON-COVERED ITEM OR SERVICE: HCPCS | Performed by: PSYCHIATRY & NEUROLOGY

## 2017-05-30 PROCEDURE — A9270 NON-COVERED ITEM OR SERVICE: HCPCS | Performed by: NURSE PRACTITIONER

## 2017-05-30 PROCEDURE — 700111 HCHG RX REV CODE 636 W/ 250 OVERRIDE (IP): Performed by: HOSPITALIST

## 2017-05-30 PROCEDURE — 85347 COAGULATION TIME ACTIVATED: CPT

## 2017-05-30 PROCEDURE — 700111 HCHG RX REV CODE 636 W/ 250 OVERRIDE (IP): Performed by: CLINICAL NURSE SPECIALIST

## 2017-05-30 PROCEDURE — 85384 FIBRINOGEN ACTIVITY: CPT

## 2017-05-30 RX ADMIN — BUTALBITAL, ACETAMINOPHEN, AND CAFFEINE 1 TABLET: 50; 325; 40 TABLET ORAL at 15:32

## 2017-05-30 RX ADMIN — SULFASALAZINE 1000 MG: 500 TABLET ORAL at 20:18

## 2017-05-30 RX ADMIN — HYDROMORPHONE HYDROCHLORIDE 1 MG: 1 INJECTION, SOLUTION INTRAMUSCULAR; INTRAVENOUS; SUBCUTANEOUS at 21:41

## 2017-05-30 RX ADMIN — RISPERIDONE 1 MG: 0.5 TABLET, FILM COATED ORAL at 08:58

## 2017-05-30 RX ADMIN — OXYCODONE HYDROCHLORIDE 10 MG: 10 TABLET ORAL at 12:40

## 2017-05-30 RX ADMIN — RISPERIDONE 1 MG: 0.5 TABLET, FILM COATED ORAL at 20:18

## 2017-05-30 RX ADMIN — HYDROMORPHONE HYDROCHLORIDE 1 MG: 1 INJECTION, SOLUTION INTRAMUSCULAR; INTRAVENOUS; SUBCUTANEOUS at 04:34

## 2017-05-30 RX ADMIN — ENOXAPARIN SODIUM 40 MG: 100 INJECTION SUBCUTANEOUS at 08:58

## 2017-05-30 RX ADMIN — OXYCODONE HYDROCHLORIDE 10 MG: 10 TABLET ORAL at 01:56

## 2017-05-30 RX ADMIN — DULOXETINE HYDROCHLORIDE 60 MG: 60 CAPSULE, DELAYED RELEASE ORAL at 20:18

## 2017-05-30 RX ADMIN — DIPHENHYDRAMINE HYDROCHLORIDE 50 MG: 50 INJECTION, SOLUTION INTRAMUSCULAR; INTRAVENOUS at 04:34

## 2017-05-30 RX ADMIN — HYDROMORPHONE HYDROCHLORIDE 1 MG: 1 INJECTION, SOLUTION INTRAMUSCULAR; INTRAVENOUS; SUBCUTANEOUS at 08:51

## 2017-05-30 RX ADMIN — FOLIC ACID 1 MG: 1 TABLET ORAL at 08:57

## 2017-05-30 RX ADMIN — HYDROMORPHONE HYDROCHLORIDE 1 MG: 1 INJECTION, SOLUTION INTRAMUSCULAR; INTRAVENOUS; SUBCUTANEOUS at 16:54

## 2017-05-30 RX ADMIN — LORAZEPAM 0.5 MG: 2 INJECTION INTRAMUSCULAR at 15:35

## 2017-05-30 RX ADMIN — SULFASALAZINE 1000 MG: 500 TABLET ORAL at 08:57

## 2017-05-30 RX ADMIN — STANDARDIZED SENNA CONCENTRATE AND DOCUSATE SODIUM 2 TABLET: 8.6; 5 TABLET, FILM COATED ORAL at 09:05

## 2017-05-30 RX ADMIN — GABAPENTIN 600 MG: 300 CAPSULE ORAL at 20:22

## 2017-05-30 RX ADMIN — OXYCODONE HYDROCHLORIDE 10 MG: 10 TABLET ORAL at 20:19

## 2017-05-30 RX ADMIN — DIPHENHYDRAMINE HYDROCHLORIDE 50 MG: 50 INJECTION, SOLUTION INTRAMUSCULAR; INTRAVENOUS at 21:39

## 2017-05-30 RX ADMIN — ACETAZOLAMIDE 500 MG: 500 CAPSULE, EXTENDED RELEASE ORAL at 08:58

## 2017-05-30 RX ADMIN — PREDNISONE 10 MG: 10 TABLET ORAL at 08:58

## 2017-05-30 RX ADMIN — STANDARDIZED SENNA CONCENTRATE AND DOCUSATE SODIUM 2 TABLET: 8.6; 5 TABLET, FILM COATED ORAL at 20:18

## 2017-05-30 RX ADMIN — HYDROMORPHONE HYDROCHLORIDE 1 MG: 1 INJECTION, SOLUTION INTRAMUSCULAR; INTRAVENOUS; SUBCUTANEOUS at 12:58

## 2017-05-30 RX ADMIN — DIPHENHYDRAMINE HYDROCHLORIDE 50 MG: 50 INJECTION, SOLUTION INTRAMUSCULAR; INTRAVENOUS at 09:06

## 2017-05-30 RX ADMIN — GABAPENTIN 600 MG: 300 CAPSULE ORAL at 15:32

## 2017-05-30 RX ADMIN — Medication 500 MG: at 08:58

## 2017-05-30 RX ADMIN — FERROUS GLUCONATE 324 MG: 324 TABLET ORAL at 16:54

## 2017-05-30 RX ADMIN — DULOXETINE HYDROCHLORIDE 60 MG: 60 CAPSULE, DELAYED RELEASE ORAL at 08:58

## 2017-05-30 RX ADMIN — Medication 500 MG: at 16:54

## 2017-05-30 RX ADMIN — GABAPENTIN 600 MG: 300 CAPSULE ORAL at 08:57

## 2017-05-30 RX ADMIN — BUTALBITAL, ACETAMINOPHEN, AND CAFFEINE 1 TABLET: 50; 325; 40 TABLET ORAL at 01:56

## 2017-05-30 RX ADMIN — FERROUS GLUCONATE 324 MG: 324 TABLET ORAL at 08:58

## 2017-05-30 RX ADMIN — ACETAZOLAMIDE 500 MG: 500 CAPSULE, EXTENDED RELEASE ORAL at 20:18

## 2017-05-30 ASSESSMENT — ENCOUNTER SYMPTOMS
CHILLS: 0
NAUSEA: 0
SHORTNESS OF BREATH: 0
VOMITING: 0
HEADACHES: 1
ABDOMINAL PAIN: 0
PALPITATIONS: 0
DIZZINESS: 0
WEAKNESS: 1
DIARRHEA: 0
MYALGIAS: 0
FOCAL WEAKNESS: 0
COUGH: 0
CONSTIPATION: 0
FEVER: 0

## 2017-05-30 ASSESSMENT — PAIN SCALES - GENERAL
PAINLEVEL_OUTOF10: 8
PAINLEVEL_OUTOF10: 8
PAINLEVEL_OUTOF10: 9

## 2017-05-30 NOTE — PROGRESS NOTES
Hospital Medicine ICU Interval Note    Date of Service: 2017    Chief Complaint  Enedelia Pang is a 46 y.o. female w/ h/o pseudotumor cerebri, CVA, prior  shunt with infection thus removal admitted 2017 for migraine headache. Kristi consulted as second opinion and will do  shunt     Interval Problem Update  Headache and blurred vision today. Awaiting surgery  Dilaudid helping some    Consultants/Specialty  Luceroplalice, surgery  Keyvani, neurology    Disposition  OR on   Likely home          Review of Systems   Constitutional: Negative for fever and chills.   Respiratory: Negative for cough and shortness of breath.    Cardiovascular: Negative for chest pain and palpitations.   Gastrointestinal: Negative for nausea, vomiting, abdominal pain, diarrhea and constipation.   Genitourinary: Negative for dysuria.   Musculoskeletal: Negative for myalgias.   Skin: Negative for itching.   Neurological: Positive for weakness and headaches. Negative for dizziness and focal weakness.   All other systems reviewed and are negative.     Physical Exam  Laboratory/Imaging   Hemodynamics  Temp (24hrs), Av.6 °C (97.9 °F), Min:36.3 °C (97.3 °F), Max:36.9 °C (98.4 °F)   Temperature: 36.3 °C (97.3 °F)  Pulse  Av.2  Min: 78  Max: 150   Blood Pressure: 119/74 mmHg      Respiratory      Respiration: 16, Pulse Oximetry: 96 %             Fluids       Nutrition  Orders Placed This Encounter   Procedures   • Diet Order     Standing Status: Standing      Number of Occurrences: 1      Standing Expiration Date:      Order Specific Question:  Diet:     Answer:  Regular [1]   • DIET NPO     Standing Status: Standing      Number of Occurrences: 8      Standing Expiration Date:      Order Specific Question:  Restrict to:     Answer:  Sips with Medications [3]     Physical Exam   Constitutional: She is oriented to person, place, and time. She appears well-developed and well-nourished. No distress.   HENT:   Head:  Normocephalic and atraumatic.   Right Ear: External ear normal.   Left Ear: External ear normal.   Nose: Nose normal.   Mouth/Throat: Oropharynx is clear and moist.   Eyes: Conjunctivae are normal. Pupils are equal, round, and reactive to light. Right eye exhibits no discharge. Left eye exhibits no discharge. No scleral icterus.   Neck: No JVD present.   Cardiovascular: Normal rate, regular rhythm, normal heart sounds and intact distal pulses.    No murmur heard.  Pulmonary/Chest: Effort normal and breath sounds normal. No stridor. No respiratory distress. She has no wheezes. She has no rales.   Abdominal: Soft. Bowel sounds are normal. She exhibits no distension. There is no tenderness.   Musculoskeletal: Normal range of motion. She exhibits no edema or tenderness.   Neurological: She is alert and oriented to person, place, and time.   Skin: Skin is warm and dry. She is not diaphoretic. No erythema.   Psychiatric: She has a normal mood and affect. Her behavior is normal.   Nursing note and vitals reviewed.                               Assessment/Plan     * Status migrainosus (present on admission)  Assessment & Plan  Diamox, PRN benadryl, fioricet, oxy, dilaudid for breakthrough pain  LP 5/21- opening pressure 36, glucose 67, protein 38, WBC 4; improvement of HA noted   plan for  shut placement on Wednesday, LPs for HA in the mean time  Appreciate neurology consult, Dr Ibarra- signed off    Anxiety (present on admission)  Assessment & Plan  S/p psychiatry consult, Dr Puga; denies SI   Ativan prn  Cymbalta      Diarrhea (present on admission)  Assessment & Plan  Chronic, no acute management    LFT elevation (present on admission)  Assessment & Plan  Trending  If worsened stop methotrexate per rheumatology    Whole body pain (present on admission)  Assessment & Plan  Outpatient w/u    Chronic pain (present on admission)  Assessment & Plan  Avoid escalation in meds.     Rheumatoid arthritis (CMS-HCC) (present  on admission)  Assessment & Plan   OP workup      Labs reviewed, Medications reviewed, EKG reviewed and Radiology images reviewed  Lopez catheter: No Lopez      DVT Prophylaxis: Enoxaparin (Lovenox)    Ulcer prophylaxis: Not indicated    Assessed for rehab: Patient was assess for and/or received rehabilitation services during this hospitalization

## 2017-05-30 NOTE — PROGRESS NOTES
NEUROSURGERY:    C/o headache and blurry vision- getting progressively worse since LP a few days ago, otherwise stable    Plan:   NPO at [facility]  OR tomorrow for  shunt  Stop lovenox after this am dose

## 2017-05-30 NOTE — CARE PLAN
Problem: Communication  Goal: The ability to communicate needs accurately and effectively will improve  Outcome: PROGRESSING AS EXPECTED  Patient has call light within reach; uses appropriately. Communicates needs effectively. Frequent rounding    Problem: Psychosocial Needs:  Goal: Level of anxiety will decrease  Outcome: PROGRESSING AS EXPECTED  Patient reports migraine, but that she is handling it well with medications. Will continue to promote healthy anxiety coping mechanisms.

## 2017-05-30 NOTE — PROGRESS NOTES
"Report received from Darleen FIGUEROA.    Last documented VS: /70 mmHg  Pulse 100  Temp(Src) 35.8 °C (96.5 °F)  Resp 16  Ht 1.6 m (5' 3\")  Wt 71.9 kg (158 lb 8.2 oz)  BMI 28.09 kg/m2  SpO2 94%  LMP 05/09/2017  Breastfeeding? No      no signs of acute distress, patient appears to be in stable condition. Enedelia Pang assessed after assuming care.       Mobility: Patient in bed at this time    Fall precautions in place. Hourly rounding in place and explained to Enedelia. Educated Enedelia on call light use as well as phone instructions to call RN or CNA directly. Possessions within patient's reach, fall precautions in place. Pressure ulcer prevention techniques in use, pressure points assessed and pressure relieved from vulnerable areas. Pillows uses copiously for support and positioning where applicable.    All prudent patient safety measures and applicable nursing interventions taken.    Enedelia educated on Pain, Position, Potty, Priorities and instructed to notify RN if anything additional can be done to make stay more comfortable including bathing options, linen change, and toiletries.    PMHx on file (click to expand)  Past Medical History   Diagnosis Date   • Migraine with aura, with intractable migraine, so stated, without mention of status migrainosus    • H/O Clostridium difficile infection    • Hydrocephalus      with lumbar shunt   • Hx MRSA infection    • Pituitary abnormality (CMS-HCC)    • Allergy, unspecified not elsewhere classified    • Anxiety    • Depression    • Stroke (CMS-HCC)      2007   • autoimmune           Care plan:     Problem: Safety   Goal: Will remain free from falls   Outcome: PROGRESSING as EXPECTED   Enedelia educated about fall risk. Will remain free from injury or falls.  Appropriate side rails up. Bed in low position, call light and possions within  reach, bed alarm in use. Hourly rounding in place.     Problem: Pain Management   Goal: Pain level will decrease to patient’s comfort " goal   Outcome: PROGRESSING as EXPECTED   Following pain management plan, Enedelia reports pain on 0-10 scale         *addendum to follow below at end or throughout shift if significant events occurred: if blank n/a    OVERNIGHT SIGNIFICANT EVENTS:    n/a

## 2017-05-30 NOTE — PROGRESS NOTES
Enedelia Pang is AO4 and refused bed alarm despite education. Instructed pt to call for assistance before getting OOB. Pt verbalized understanding. Call light w/in reach. Bed is in low position. Treaded slippers on patient. foresee-able fall precautions in place.

## 2017-05-31 ENCOUNTER — APPOINTMENT (OUTPATIENT)
Dept: RADIOLOGY | Facility: MEDICAL CENTER | Age: 47
DRG: 032 | End: 2017-05-31
Attending: CLINICAL NURSE SPECIALIST
Payer: MEDICARE

## 2017-05-31 LAB
ALBUMIN SERPL BCP-MCNC: 3.8 G/DL (ref 3.2–4.9)
ALBUMIN/GLOB SERPL: 1.4 G/DL
ALP SERPL-CCNC: 167 U/L (ref 30–99)
ALT SERPL-CCNC: 402 U/L (ref 2–50)
ANION GAP SERPL CALC-SCNC: 7 MMOL/L (ref 0–11.9)
AST SERPL-CCNC: 153 U/L (ref 12–45)
BASOPHILS # BLD AUTO: 0.7 % (ref 0–1.8)
BASOPHILS # BLD: 0.06 K/UL (ref 0–0.12)
BILIRUB SERPL-MCNC: 0.3 MG/DL (ref 0.1–1.5)
BUN SERPL-MCNC: 14 MG/DL (ref 8–22)
CALCIUM SERPL-MCNC: 9.5 MG/DL (ref 8.5–10.5)
CHLORIDE SERPL-SCNC: 107 MMOL/L (ref 96–112)
CO2 SERPL-SCNC: 23 MMOL/L (ref 20–33)
CREAT SERPL-MCNC: 0.67 MG/DL (ref 0.5–1.4)
EOSINOPHIL # BLD AUTO: 0.01 K/UL (ref 0–0.51)
EOSINOPHIL NFR BLD: 0.1 % (ref 0–6.9)
ERYTHROCYTE [DISTWIDTH] IN BLOOD BY AUTOMATED COUNT: 51.8 FL (ref 35.9–50)
GFR SERPL CREATININE-BSD FRML MDRD: >60 ML/MIN/1.73 M 2
GLOBULIN SER CALC-MCNC: 2.7 G/DL (ref 1.9–3.5)
GLUCOSE SERPL-MCNC: 94 MG/DL (ref 65–99)
HCG UR QL: NEGATIVE
HCT VFR BLD AUTO: 39.7 % (ref 37–47)
HGB BLD-MCNC: 12.9 G/DL (ref 12–16)
IMM GRANULOCYTES # BLD AUTO: 0.16 K/UL (ref 0–0.11)
IMM GRANULOCYTES NFR BLD AUTO: 1.8 % (ref 0–0.9)
LYMPHOCYTES # BLD AUTO: 1.74 K/UL (ref 1–4.8)
LYMPHOCYTES NFR BLD: 19.3 % (ref 22–41)
MCH RBC QN AUTO: 32.7 PG (ref 27–33)
MCHC RBC AUTO-ENTMCNC: 32.5 G/DL (ref 33.6–35)
MCV RBC AUTO: 100.8 FL (ref 81.4–97.8)
MONOCYTES # BLD AUTO: 0.68 K/UL (ref 0–0.85)
MONOCYTES NFR BLD AUTO: 7.5 % (ref 0–13.4)
NEUTROPHILS # BLD AUTO: 6.38 K/UL (ref 2–7.15)
NEUTROPHILS NFR BLD: 70.6 % (ref 44–72)
NRBC # BLD AUTO: 0 K/UL
NRBC BLD AUTO-RTO: 0 /100 WBC
PLATELET # BLD AUTO: 349 K/UL (ref 164–446)
PMV BLD AUTO: 9.4 FL (ref 9–12.9)
POTASSIUM SERPL-SCNC: 3.3 MMOL/L (ref 3.6–5.5)
PROT SERPL-MCNC: 6.5 G/DL (ref 6–8.2)
RBC # BLD AUTO: 3.94 M/UL (ref 4.2–5.4)
SODIUM SERPL-SCNC: 137 MMOL/L (ref 135–145)
SP GR UR REFRACTOMETRY: 1.01
WBC # BLD AUTO: 9 K/UL (ref 4.8–10.8)

## 2017-05-31 PROCEDURE — 700102 HCHG RX REV CODE 250 W/ 637 OVERRIDE(OP): Performed by: NURSE PRACTITIONER

## 2017-05-31 PROCEDURE — A9270 NON-COVERED ITEM OR SERVICE: HCPCS

## 2017-05-31 PROCEDURE — 700101 HCHG RX REV CODE 250

## 2017-05-31 PROCEDURE — A9270 NON-COVERED ITEM OR SERVICE: HCPCS | Performed by: HOSPITALIST

## 2017-05-31 PROCEDURE — 160029 HCHG SURGERY MINUTES - 1ST 30 MINS LEVEL 4: Performed by: NEUROLOGICAL SURGERY

## 2017-05-31 PROCEDURE — 700111 HCHG RX REV CODE 636 W/ 250 OVERRIDE (IP)

## 2017-05-31 PROCEDURE — 160041 HCHG SURGERY MINUTES - EA ADDL 1 MIN LEVEL 4: Performed by: NEUROLOGICAL SURGERY

## 2017-05-31 PROCEDURE — 770022 HCHG ROOM/CARE - ICU (200)

## 2017-05-31 PROCEDURE — 700105 HCHG RX REV CODE 258: Performed by: CLINICAL NURSE SPECIALIST

## 2017-05-31 PROCEDURE — 700102 HCHG RX REV CODE 250 W/ 637 OVERRIDE(OP): Performed by: INTERNAL MEDICINE

## 2017-05-31 PROCEDURE — 700111 HCHG RX REV CODE 636 W/ 250 OVERRIDE (IP): Performed by: NURSE PRACTITIONER

## 2017-05-31 PROCEDURE — 501370: Performed by: NEUROLOGICAL SURGERY

## 2017-05-31 PROCEDURE — 160035 HCHG PACU - 1ST 60 MINS PHASE I: Performed by: NEUROLOGICAL SURGERY

## 2017-05-31 PROCEDURE — 160048 HCHG OR STATISTICAL LEVEL 1-5: Performed by: NEUROLOGICAL SURGERY

## 2017-05-31 PROCEDURE — 70450 CT HEAD/BRAIN W/O DYE: CPT

## 2017-05-31 PROCEDURE — 80053 COMPREHEN METABOLIC PANEL: CPT

## 2017-05-31 PROCEDURE — A9270 NON-COVERED ITEM OR SERVICE: HCPCS | Performed by: NURSE PRACTITIONER

## 2017-05-31 PROCEDURE — 00160J6 BYPASS CEREBRAL VENTRICLE TO PERITONEAL CAVITY WITH SYNTHETIC SUBSTITUTE, OPEN APPROACH: ICD-10-PCS | Performed by: NEUROLOGICAL SURGERY

## 2017-05-31 PROCEDURE — 85025 COMPLETE CBC W/AUTO DIFF WBC: CPT

## 2017-05-31 PROCEDURE — A9270 NON-COVERED ITEM OR SERVICE: HCPCS | Performed by: INTERNAL MEDICINE

## 2017-05-31 PROCEDURE — 700111 HCHG RX REV CODE 636 W/ 250 OVERRIDE (IP): Performed by: CLINICAL NURSE SPECIALIST

## 2017-05-31 PROCEDURE — 700102 HCHG RX REV CODE 250 W/ 637 OVERRIDE(OP)

## 2017-05-31 PROCEDURE — 700102 HCHG RX REV CODE 250 W/ 637 OVERRIDE(OP): Performed by: HOSPITALIST

## 2017-05-31 PROCEDURE — 502240 HCHG MISC OR SUPPLY RC 0272: Performed by: NEUROLOGICAL SURGERY

## 2017-05-31 PROCEDURE — 502626 HCHG SURGIFLO HEMOSTATIC MATRIX 6ML: Performed by: NEUROLOGICAL SURGERY

## 2017-05-31 PROCEDURE — 81025 URINE PREGNANCY TEST: CPT

## 2017-05-31 PROCEDURE — 500075 HCHG BLADE, CLIPPER NEURO: Performed by: NEUROLOGICAL SURGERY

## 2017-05-31 PROCEDURE — 700101 HCHG RX REV CODE 250: Performed by: CLINICAL NURSE SPECIALIST

## 2017-05-31 PROCEDURE — 501838 HCHG SUTURE GENERAL: Performed by: NEUROLOGICAL SURGERY

## 2017-05-31 PROCEDURE — 160009 HCHG ANES TIME/MIN: Performed by: NEUROLOGICAL SURGERY

## 2017-05-31 PROCEDURE — 700111 HCHG RX REV CODE 636 W/ 250 OVERRIDE (IP): Performed by: HOSPITALIST

## 2017-05-31 PROCEDURE — 160002 HCHG RECOVERY MINUTES (STAT): Performed by: NEUROLOGICAL SURGERY

## 2017-05-31 PROCEDURE — A9270 NON-COVERED ITEM OR SERVICE: HCPCS | Performed by: PSYCHIATRY & NEUROLOGY

## 2017-05-31 PROCEDURE — 700102 HCHG RX REV CODE 250 W/ 637 OVERRIDE(OP): Performed by: PSYCHIATRY & NEUROLOGY

## 2017-05-31 PROCEDURE — 501445 HCHG STAPLER, SKIN DISP: Performed by: NEUROLOGICAL SURGERY

## 2017-05-31 PROCEDURE — 501356: Performed by: NEUROLOGICAL SURGERY

## 2017-05-31 PROCEDURE — 500912 HCHG PERFORATOR, DISP TIP: Performed by: NEUROLOGICAL SURGERY

## 2017-05-31 PROCEDURE — 99233 SBSQ HOSP IP/OBS HIGH 50: CPT | Performed by: HOSPITALIST

## 2017-05-31 PROCEDURE — 160036 HCHG PACU - EA ADDL 30 MINS PHASE I: Performed by: NEUROLOGICAL SURGERY

## 2017-05-31 DEVICE — SHUNT CSF CARDIAC/PERI CATHETER STANDARD: Type: IMPLANTABLE DEVICE | Status: FUNCTIONAL

## 2017-05-31 DEVICE — SHUNT VALVE MEDTRONIC: Type: IMPLANTABLE DEVICE | Status: FUNCTIONAL

## 2017-05-31 RX ORDER — HYDROMORPHONE HYDROCHLORIDE 2 MG/ML
INJECTION, SOLUTION INTRAMUSCULAR; INTRAVENOUS; SUBCUTANEOUS
Status: COMPLETED
Start: 2017-05-31 | End: 2017-05-31

## 2017-05-31 RX ORDER — AMOXICILLIN 250 MG
2 CAPSULE ORAL DAILY
Status: DISCONTINUED | OUTPATIENT
Start: 2017-05-31 | End: 2017-06-03 | Stop reason: HOSPADM

## 2017-05-31 RX ORDER — ONDANSETRON 2 MG/ML
4 INJECTION INTRAMUSCULAR; INTRAVENOUS EVERY 4 HOURS PRN
Status: DISCONTINUED | OUTPATIENT
Start: 2017-05-31 | End: 2017-06-03 | Stop reason: HOSPADM

## 2017-05-31 RX ORDER — LABETALOL HYDROCHLORIDE 5 MG/ML
10 INJECTION, SOLUTION INTRAVENOUS
Status: DISCONTINUED | OUTPATIENT
Start: 2017-05-31 | End: 2017-06-03 | Stop reason: HOSPADM

## 2017-05-31 RX ORDER — ENEMA 19; 7 G/133ML; G/133ML
1 ENEMA RECTAL
Status: DISCONTINUED | OUTPATIENT
Start: 2017-05-31 | End: 2017-06-03 | Stop reason: HOSPADM

## 2017-05-31 RX ORDER — CLONIDINE HYDROCHLORIDE 0.1 MG/1
0.1 TABLET ORAL EVERY 4 HOURS PRN
Status: DISCONTINUED | OUTPATIENT
Start: 2017-05-31 | End: 2017-06-03 | Stop reason: HOSPADM

## 2017-05-31 RX ORDER — BISACODYL 10 MG
10 SUPPOSITORY, RECTAL RECTAL
Status: DISCONTINUED | OUTPATIENT
Start: 2017-05-31 | End: 2017-06-03 | Stop reason: HOSPADM

## 2017-05-31 RX ORDER — HYDRALAZINE HYDROCHLORIDE 20 MG/ML
10 INJECTION INTRAMUSCULAR; INTRAVENOUS
Status: DISCONTINUED | OUTPATIENT
Start: 2017-05-31 | End: 2017-06-03 | Stop reason: HOSPADM

## 2017-05-31 RX ORDER — BUPIVACAINE HYDROCHLORIDE AND EPINEPHRINE 5; 5 MG/ML; UG/ML
INJECTION, SOLUTION EPIDURAL; INTRACAUDAL; PERINEURAL
Status: DISCONTINUED | OUTPATIENT
Start: 2017-05-31 | End: 2017-05-31 | Stop reason: HOSPADM

## 2017-05-31 RX ORDER — PROMETHAZINE HYDROCHLORIDE 25 MG/1
12.5 SUPPOSITORY RECTAL EVERY 4 HOURS PRN
Status: DISCONTINUED | OUTPATIENT
Start: 2017-05-31 | End: 2017-06-03 | Stop reason: HOSPADM

## 2017-05-31 RX ORDER — METOCLOPRAMIDE HYDROCHLORIDE 5 MG/ML
5 INJECTION INTRAMUSCULAR; INTRAVENOUS EVERY 4 HOURS PRN
Status: DISCONTINUED | OUTPATIENT
Start: 2017-05-31 | End: 2017-06-03 | Stop reason: HOSPADM

## 2017-05-31 RX ORDER — ONDANSETRON 2 MG/ML
INJECTION INTRAMUSCULAR; INTRAVENOUS
Status: COMPLETED
Start: 2017-05-31 | End: 2017-05-31

## 2017-05-31 RX ORDER — SODIUM CHLORIDE AND POTASSIUM CHLORIDE 150; 900 MG/100ML; MG/100ML
INJECTION, SOLUTION INTRAVENOUS CONTINUOUS
Status: DISCONTINUED | OUTPATIENT
Start: 2017-05-31 | End: 2017-06-03 | Stop reason: HOSPADM

## 2017-05-31 RX ADMIN — HYDROMORPHONE HYDROCHLORIDE 1 MG: 1 INJECTION, SOLUTION INTRAMUSCULAR; INTRAVENOUS; SUBCUTANEOUS at 06:24

## 2017-05-31 RX ADMIN — HYDROMORPHONE HYDROCHLORIDE 1 MG: 1 INJECTION, SOLUTION INTRAMUSCULAR; INTRAVENOUS; SUBCUTANEOUS at 02:05

## 2017-05-31 RX ADMIN — POTASSIUM CHLORIDE AND SODIUM CHLORIDE: 900; 150 INJECTION, SOLUTION INTRAVENOUS at 12:47

## 2017-05-31 RX ADMIN — FENTANYL CITRATE 50 MCG: 50 INJECTION, SOLUTION INTRAMUSCULAR; INTRAVENOUS at 09:39

## 2017-05-31 RX ADMIN — LORAZEPAM 0.5 MG: 2 INJECTION INTRAMUSCULAR at 23:45

## 2017-05-31 RX ADMIN — ONDANSETRON 4 MG: 2 INJECTION INTRAMUSCULAR; INTRAVENOUS at 09:48

## 2017-05-31 RX ADMIN — OXYCODONE HYDROCHLORIDE 10 MG: 10 TABLET ORAL at 04:30

## 2017-05-31 RX ADMIN — HYDROMORPHONE HYDROCHLORIDE 1 MG: 1 INJECTION, SOLUTION INTRAMUSCULAR; INTRAVENOUS; SUBCUTANEOUS at 16:54

## 2017-05-31 RX ADMIN — HYDROMORPHONE HYDROCHLORIDE 0.5 MG: 2 INJECTION INTRAMUSCULAR; INTRAVENOUS; SUBCUTANEOUS at 10:30

## 2017-05-31 RX ADMIN — HYDROMORPHONE HYDROCHLORIDE 0.2 MG: 1 INJECTION, SOLUTION INTRAMUSCULAR; INTRAVENOUS; SUBCUTANEOUS at 10:15

## 2017-05-31 RX ADMIN — RISPERIDONE 1 MG: 0.5 TABLET, FILM COATED ORAL at 20:37

## 2017-05-31 RX ADMIN — OXYCODONE HYDROCHLORIDE 10 MG: 10 TABLET ORAL at 19:16

## 2017-05-31 RX ADMIN — HYDROMORPHONE HYDROCHLORIDE 0.5 MG: 2 INJECTION INTRAMUSCULAR; INTRAVENOUS; SUBCUTANEOUS at 10:45

## 2017-05-31 RX ADMIN — HYDROMORPHONE HYDROCHLORIDE 0.5 MG: 2 INJECTION INTRAMUSCULAR; INTRAVENOUS; SUBCUTANEOUS at 10:40

## 2017-05-31 RX ADMIN — FERROUS GLUCONATE 324 MG: 324 TABLET ORAL at 17:04

## 2017-05-31 RX ADMIN — HYDROMORPHONE HYDROCHLORIDE 0.5 MG: 2 INJECTION INTRAMUSCULAR; INTRAVENOUS; SUBCUTANEOUS at 10:35

## 2017-05-31 RX ADMIN — SULFASALAZINE 1000 MG: 500 TABLET ORAL at 20:39

## 2017-05-31 RX ADMIN — HYDROMORPHONE HYDROCHLORIDE 0.2 MG: 1 INJECTION, SOLUTION INTRAMUSCULAR; INTRAVENOUS; SUBCUTANEOUS at 10:25

## 2017-05-31 RX ADMIN — OXYCODONE HYDROCHLORIDE 10 MG: 10 TABLET ORAL at 15:01

## 2017-05-31 RX ADMIN — HYDROMORPHONE HYDROCHLORIDE 1 MG: 1 INJECTION, SOLUTION INTRAMUSCULAR; INTRAVENOUS; SUBCUTANEOUS at 12:43

## 2017-05-31 RX ADMIN — HYDROMORPHONE HYDROCHLORIDE 0.2 MG: 1 INJECTION, SOLUTION INTRAMUSCULAR; INTRAVENOUS; SUBCUTANEOUS at 10:10

## 2017-05-31 RX ADMIN — ZOLPIDEM TARTRATE 10 MG: 5 TABLET, FILM COATED ORAL at 21:39

## 2017-05-31 RX ADMIN — DULOXETINE HYDROCHLORIDE 60 MG: 60 CAPSULE, DELAYED RELEASE ORAL at 20:37

## 2017-05-31 RX ADMIN — OXYCODONE HYDROCHLORIDE 10 MG: 10 TABLET ORAL at 23:14

## 2017-05-31 RX ADMIN — Medication 500 MG: at 17:04

## 2017-05-31 RX ADMIN — HYDROMORPHONE HYDROCHLORIDE 0.2 MG: 1 INJECTION, SOLUTION INTRAMUSCULAR; INTRAVENOUS; SUBCUTANEOUS at 10:20

## 2017-05-31 RX ADMIN — GABAPENTIN 600 MG: 300 CAPSULE ORAL at 15:01

## 2017-05-31 RX ADMIN — DIPHENHYDRAMINE HYDROCHLORIDE 50 MG: 50 INJECTION, SOLUTION INTRAMUSCULAR; INTRAVENOUS at 06:13

## 2017-05-31 RX ADMIN — DIPHENHYDRAMINE HYDROCHLORIDE 50 MG: 50 INJECTION, SOLUTION INTRAMUSCULAR; INTRAVENOUS at 19:16

## 2017-05-31 RX ADMIN — GABAPENTIN 600 MG: 300 CAPSULE ORAL at 20:37

## 2017-05-31 RX ADMIN — HYDROMORPHONE HYDROCHLORIDE 1 MG: 1 INJECTION, SOLUTION INTRAMUSCULAR; INTRAVENOUS; SUBCUTANEOUS at 20:39

## 2017-05-31 RX ADMIN — FENTANYL CITRATE 50 MCG: 50 INJECTION, SOLUTION INTRAMUSCULAR; INTRAVENOUS at 09:46

## 2017-05-31 RX ADMIN — HYDROMORPHONE HYDROCHLORIDE 0.2 MG: 1 INJECTION, SOLUTION INTRAMUSCULAR; INTRAVENOUS; SUBCUTANEOUS at 09:50

## 2017-05-31 RX ADMIN — CEFUROXIME SODIUM 1500 MG: 7.5 INJECTION, POWDER, FOR SOLUTION INTRAVENOUS at 15:01

## 2017-05-31 RX ADMIN — DIPHENHYDRAMINE HYDROCHLORIDE 50 MG: 50 INJECTION, SOLUTION INTRAMUSCULAR; INTRAVENOUS at 14:11

## 2017-05-31 ASSESSMENT — PAIN SCALES - GENERAL
PAINLEVEL_OUTOF10: 9
PAINLEVEL_OUTOF10: 8
PAINLEVEL_OUTOF10: 5
PAINLEVEL_OUTOF10: 9
PAINLEVEL_OUTOF10: 8
PAINLEVEL_OUTOF10: 0
PAINLEVEL_OUTOF10: 7
PAINLEVEL_OUTOF10: 8
PAINLEVEL_OUTOF10: 9
PAINLEVEL_OUTOF10: 7
PAINLEVEL_OUTOF10: 9
PAINLEVEL_OUTOF10: 9
PAINLEVEL_OUTOF10: 8
PAINLEVEL_OUTOF10: 8
PAINLEVEL_OUTOF10: 9
PAINLEVEL_OUTOF10: 8
PAINLEVEL_OUTOF10: 7

## 2017-05-31 ASSESSMENT — ENCOUNTER SYMPTOMS
SHORTNESS OF BREATH: 0
WEAKNESS: 1
CONSTIPATION: 0
CHILLS: 0
ABDOMINAL PAIN: 0
DIZZINESS: 0
MYALGIAS: 0
NAUSEA: 0
PALPITATIONS: 0
FOCAL WEAKNESS: 0
VOMITING: 0
FEVER: 0
DIARRHEA: 0
COUGH: 0
HEADACHES: 1

## 2017-05-31 NOTE — CARE PLAN
Problem: Pain Management  Goal: Pain level will decrease to patient’s comfort goal  Outcome: PROGRESSING SLOWER THAN EXPECTED    Problem: Mobility  Goal: Risk for activity intolerance will decrease  Outcome: PROGRESSING AS EXPECTED

## 2017-05-31 NOTE — PROGRESS NOTES
Received report from off going nurse. Nurse stated no sig med event during shift. Pt has constant c/o pain, will medicate per orders. Vss. Will continue to monitor.

## 2017-05-31 NOTE — PROGRESS NOTES
Hospital Medicine ICU Interval Note    Date of Service: 2017    Chief Complaint  Enedelia Pang is a 46 y.o. female w/ h/o pseudotumor cerebri, CVA, prior  shunt with infection thus removal admitted 2017 for migraine headache. Kristi consulted as second opinion and scheduled for  shunt     Interval Problem Update  Patient seen and examined today. ICU Care  Care and plan discussed in IDT/Hot rounds.  Lines and assistive devices reviewed.    Patient tolerating treatment and therapies.  All Data, Medication data reviewed.  Case discussed with nursing as available.  Plan of Care reviewed with patient and notified of changes.   Pt tolerated procedure well, post op pain, HA, denies vision changes or F/C    Consultants/Specialty  Leppla, surgery  Keyvani, neurology    Disposition  ICU post op          Review of Systems   Constitutional: Negative for fever and chills.   Respiratory: Negative for cough and shortness of breath.    Cardiovascular: Negative for chest pain and palpitations.   Gastrointestinal: Negative for nausea, vomiting, abdominal pain, diarrhea and constipation.   Genitourinary: Negative for dysuria.   Musculoskeletal: Negative for myalgias.   Skin: Negative for itching.   Neurological: Positive for weakness and headaches. Negative for dizziness and focal weakness.   All other systems reviewed and are negative.     Physical Exam  Laboratory/Imaging   Hemodynamics  Temp (24hrs), Av.5 °C (97.7 °F), Min:36.1 °C (97 °F), Max:37 °C (98.6 °F)   Temperature: 37 °C (98.6 °F)  Pulse  Av.1  Min: 78  Max: 150 Heart Rate (Monitored): (!) 101  Blood Pressure: 116/71 mmHg, NIBP: 105/58 mmHg      Respiratory      Respiration: (!) 9, Pulse Oximetry: 90 %             Fluids       Nutrition  Orders Placed This Encounter   Procedures   • DIET ORDER     Standing Status: Standing      Number of Occurrences: 1      Standing Expiration Date:      Order Specific Question:  Diet:     Answer:   Regular [1]     Physical Exam   Constitutional: She is oriented to person, place, and time. She appears well-developed and well-nourished. No distress.   HENT:   Head: Normocephalic and atraumatic.   Right Ear: External ear normal.   Left Ear: External ear normal.   Nose: Nose normal.   Mouth/Throat: Oropharynx is clear and moist.   Post op changes, dressing intact   Eyes: Conjunctivae are normal. Pupils are equal, round, and reactive to light. Right eye exhibits no discharge. Left eye exhibits no discharge. No scleral icterus.   Neck: No JVD present.   Cardiovascular: Normal rate, regular rhythm, normal heart sounds and intact distal pulses.    No murmur heard.  Pulmonary/Chest: Effort normal and breath sounds normal. No stridor. No respiratory distress. She has no wheezes. She has no rales.   Abdominal: Soft. Bowel sounds are normal. She exhibits no distension. There is no tenderness.   Musculoskeletal: Normal range of motion. She exhibits no edema or tenderness.   Neurological: She is alert and oriented to person, place, and time.   Skin: Skin is warm and dry. She is not diaphoretic. No erythema.   Psychiatric: She has a normal mood and affect. Her behavior is normal.   Nursing note and vitals reviewed.      Recent Labs      05/31/17   0228   WBC  9.0   RBC  3.94*   HEMOGLOBIN  12.9   HEMATOCRIT  39.7   MCV  100.8*   MCH  32.7   MCHC  32.5*   RDW  51.8*   PLATELETCT  349   MPV  9.4     Recent Labs      05/31/17   0228   SODIUM  137   POTASSIUM  3.3*   CHLORIDE  107   CO2  23   GLUCOSE  94   BUN  14   CREATININE  0.67   CALCIUM  9.5                      Assessment/Plan     * Status migrainosus (present on admission)  Assessment & Plan    Appreciate neurology consult, Dr Ibarra- signed off  Now s/p  shunt placement    Anxiety (present on admission)  Assessment & Plan  S/p psychiatry consult, Dr Puga; denies SI   Ativan prn  Cymbalta      Diarrhea (present on admission)  Assessment & Plan  Chronic, no acute  management    LFT elevation (present on admission)  Assessment & Plan  Trending, still elevated, though no major change  If worsened stop methotrexate per rheumatology    Whole body pain (present on admission)  Assessment & Plan  Outpatient w/u    Chronic pain (present on admission)  Assessment & Plan  Avoid escalation in meds.     Rheumatoid arthritis (CMS-HCC) (present on admission)  Assessment & Plan   O/P workup, on current tentative TX    Plan  Observer post-op overnight  Pain control  C/w supportive Rx and TX  Pain control   see orders  Pt is critically ill in ICU  Time spent 35 min, no overlap  Labs reviewed, Medications reviewed, EKG reviewed and Radiology images reviewed  Lopez catheter: No Lopez      DVT Prophylaxis: Enoxaparin (Lovenox)    Ulcer prophylaxis: Not indicated    Assessed for rehab: Patient was assess for and/or received rehabilitation services during this hospitalization

## 2017-05-31 NOTE — CARE PLAN
Problem: Pain Management  Goal: Pain level will decrease to patient’s comfort goal  Medicated as needed per MAR    Problem: Safety  Goal: Will remain free from injury  Intervention: Provide assistance with mobility  Safety precautions in place

## 2017-05-31 NOTE — OP REPORT
DATE OF SERVICE:  05/31/2017    PRINCIPAL SURGEON:  Drew Berry MD    FIRST SURGICAL ASSISTANT:  WILY Miles, certified first surgical   assistant.    PREOPERATIVE DIAGNOSIS:  Pseudotumor cerebri.    POSTOPERATIVE DIAGNOSIS:  Pseudotumor cerebri.    PRINCIPAL PROCEDURES:  Right frontal ventricular to peritoneal shunt.    ANESTHESIA:  The case is done under general anesthesia.    ESTIMATED BLOOD LOSS:  Less than 20 mL    COMPLICATIONS:  There were no intraoperative complications.    BRIEF HISTORY:  The patient is a 46-year-old woman who has undergone a   previous lumbar to peritoneal shunt due to pseudotumor cerebri.  That shunt   got infected and had to be removed by another surgeon.  She presented to   [facility] and was admitted to the internal medicine service with crescendo   headaches.  She is also complaining that she had visual changes.  These were   the same complaints that she had when she was shunted.  She had undergone a   lumbar puncture approximately 3 weeks ago which demonstrated an opening   pressure of 38 which is pathologic.  She underwent another lumbar puncture   prior to my consultation, the day previous with an opening pressure of 36,   which was obviously pathologic.  She required serial lumbar punctures in order   to control her headache and her visual changes.    Risks and benefits of the proposed placement of the right frontal to   ventricular to peritoneal shunt were discussed at length with the patient as   well as her .  We also discussed realistic expectations and outcome.    OPERATIVE REPORT:  Informed consent was obtained.  Patient was taken to the   operating room where she underwent endotracheal intubation general anesthesia   on the operating room table.  Shoulder roll was placed under the right   shoulder.  Her head was supported with the horseshoe head rest.  The right   hemicranium was shaved, prepped, and draped in usual sterile fashion.  We   prepped the neck,  chest and abdomen.  I made a alexandria with a skin marker, 3   fingerbreadths below the costal margin on the right side above the umbilicus   where we entered the peritoneum.  I also made a alexandria with a skin marker with a   tangent lined up with the mid pupillary line and the second tangent lined up   with the tragus at approximately 12 cm as measured back from the glabella.  I   made a curvilinear alexandria with a skin marker about that point.    After prepping the patient as previously described, antibiotics were   administered prior to incision.    I made a linear skin incision down below the costal margin.  Then using the   shunt passer, we passed the shunt passer up into the neck externalized it with   another incision and then pulled the peritoneal catheter through the shunt   passer tubing.    I then made a semi curvilinear skin incision about the entry port, we were to   place a bur hole for the ventriculostomy catheter.  Self-retaining retractor   was placed in the wound.    Using large curved forceps, I was able to pass the forceps down from the   cranial incision to the incision in the neck and then pull up the peritoneal   catheter up into the cranial opening.    Using the Midas Jose Alberto , we then placed a ariel hole.  The dura was   coagulated in a cruciate fashion with bipolar electrocautery.  It was opened   with a 15-bladed knife.  The darshan was coagulated with bipolar electrocautery.    We then passed a ventriculostomy catheter into the lateral ventricle releasing   cerebral spinal fluid under significant pressure.    The peritoneal catheter was purged with normal saline, treated with   antibiotics.    Preoperatively, I had programmed the Medtronic Strata II programmable valve   and set it with an opening pressure of 1.5.    The valve was purged of air with normal saline and was secured to the   peritoneal catheter with 2-0 silk tie.  The ventriculostomy catheter was then   trimmed.  It was secured to the  valve with 2-0 silk tie and then the valve was   pulled down into the pocket created with the curved forceps.    We pulled the ventriculostomy catheter out so that the depth was 6 cm as   measured from the dura into the ventricle.    We then trimmed the excess length of the peritoneal catheter.  We were able   demonstrated free flow of cerebral spinal fluid without having pump the   catheter.    The cranial incision was irrigated with normal saline, treated with   antibiotics.    The cranial over the cranial incision was closed with interrupted 2-0 Vicryl   followed by staples.    The incision in the neck was irrigated with normal saline, treated with   antibiotics.  It was closed with interrupted 2-0 Vicryl followed by staples.    Our attention then turned towards placement of the peritoneal catheter into   the peritoneum itself.  Dissection was carried down through the adipose tissue   plane.  I was able to identify the transversalis fascia.  I opened the   transversalis fascia dissected through the musculature identified the   posterior fascia, elevated that away from the pleura or from the peritoneum   and then identify the peritoneum and made a small peritoneotomy with   Metzenbaum scissors.  I then could drop a #4 Penfield into the peritoneal   cavity without meeting any resistance.  The trimmed peritoneal catheter was   placed into the peritoneum again demonstrating flow of cerebrospinal fluid   prior to placement of the catheter.    The peritoneum was reapproximated with interrupted 2-0 Vicryl.  The   superficial temporalis fascia was approximated with interrupted 2-0 Vicryl.    The skin was closed with interrupted 2-0 Vicryl followed by staples.  Sponge   and needle count were correct at the end of the case.  There were no   intraoperative complications.    _____.  Use of Medtronic Strata 2 programmable valve set at 1.5.       ____________________________________     MD ROGER DORSEY / EUGENE    DD:   05/31/2017 09:16:29  DT:  05/31/2017 10:22:34    D#:  [id number]  Job#:  [id number]

## 2017-05-31 NOTE — PROGRESS NOTES
Report received from PACU nurse, patient arrived to room S105, transferred to ICU bed, placed on ICU monitors. Patient AAOx4, blurry vision remains, but is not new, PEREARNEST JONES's, complains of headache and stomach pain. Vital signs stable, skin assessment complete with 2 RN's and skin intact. Will continue to monitor.

## 2017-06-01 LAB
ANION GAP SERPL CALC-SCNC: 8 MMOL/L (ref 0–11.9)
BUN SERPL-MCNC: 12 MG/DL (ref 8–22)
CALCIUM SERPL-MCNC: 8.9 MG/DL (ref 8.5–10.5)
CHLORIDE SERPL-SCNC: 108 MMOL/L (ref 96–112)
CO2 SERPL-SCNC: 23 MMOL/L (ref 20–33)
CREAT SERPL-MCNC: 0.55 MG/DL (ref 0.5–1.4)
ERYTHROCYTE [DISTWIDTH] IN BLOOD BY AUTOMATED COUNT: 53.6 FL (ref 35.9–50)
GFR SERPL CREATININE-BSD FRML MDRD: >60 ML/MIN/1.73 M 2
GLUCOSE SERPL-MCNC: 91 MG/DL (ref 65–99)
HCT VFR BLD AUTO: 36.6 % (ref 37–47)
HGB BLD-MCNC: 11.9 G/DL (ref 12–16)
MAGNESIUM SERPL-MCNC: 1.9 MG/DL (ref 1.5–2.5)
MCH RBC QN AUTO: 32.9 PG (ref 27–33)
MCHC RBC AUTO-ENTMCNC: 32.5 G/DL (ref 33.6–35)
MCV RBC AUTO: 101.1 FL (ref 81.4–97.8)
PHOSPHATE SERPL-MCNC: 3.6 MG/DL (ref 2.5–4.5)
PLATELET # BLD AUTO: 303 K/UL (ref 164–446)
PMV BLD AUTO: 9.5 FL (ref 9–12.9)
POTASSIUM SERPL-SCNC: 3.6 MMOL/L (ref 3.6–5.5)
RBC # BLD AUTO: 3.62 M/UL (ref 4.2–5.4)
SODIUM SERPL-SCNC: 139 MMOL/L (ref 135–145)
WBC # BLD AUTO: 9.1 K/UL (ref 4.8–10.8)

## 2017-06-01 PROCEDURE — 700111 HCHG RX REV CODE 636 W/ 250 OVERRIDE (IP): Performed by: CLINICAL NURSE SPECIALIST

## 2017-06-01 PROCEDURE — A9270 NON-COVERED ITEM OR SERVICE: HCPCS | Performed by: PSYCHIATRY & NEUROLOGY

## 2017-06-01 PROCEDURE — 85027 COMPLETE CBC AUTOMATED: CPT

## 2017-06-01 PROCEDURE — 700111 HCHG RX REV CODE 636 W/ 250 OVERRIDE (IP): Performed by: NURSE PRACTITIONER

## 2017-06-01 PROCEDURE — 700111 HCHG RX REV CODE 636 W/ 250 OVERRIDE (IP): Performed by: HOSPITALIST

## 2017-06-01 PROCEDURE — A9270 NON-COVERED ITEM OR SERVICE: HCPCS | Performed by: NURSE PRACTITIONER

## 2017-06-01 PROCEDURE — 700101 HCHG RX REV CODE 250: Performed by: CLINICAL NURSE SPECIALIST

## 2017-06-01 PROCEDURE — A9270 NON-COVERED ITEM OR SERVICE: HCPCS | Performed by: CLINICAL NURSE SPECIALIST

## 2017-06-01 PROCEDURE — 700102 HCHG RX REV CODE 250 W/ 637 OVERRIDE(OP): Performed by: NURSE PRACTITIONER

## 2017-06-01 PROCEDURE — 700102 HCHG RX REV CODE 250 W/ 637 OVERRIDE(OP): Performed by: CLINICAL NURSE SPECIALIST

## 2017-06-01 PROCEDURE — A9270 NON-COVERED ITEM OR SERVICE: HCPCS | Performed by: INTERNAL MEDICINE

## 2017-06-01 PROCEDURE — 700102 HCHG RX REV CODE 250 W/ 637 OVERRIDE(OP): Performed by: PSYCHIATRY & NEUROLOGY

## 2017-06-01 PROCEDURE — 700105 HCHG RX REV CODE 258: Performed by: CLINICAL NURSE SPECIALIST

## 2017-06-01 PROCEDURE — 80048 BASIC METABOLIC PNL TOTAL CA: CPT

## 2017-06-01 PROCEDURE — A9270 NON-COVERED ITEM OR SERVICE: HCPCS | Performed by: HOSPITALIST

## 2017-06-01 PROCEDURE — 700102 HCHG RX REV CODE 250 W/ 637 OVERRIDE(OP): Performed by: INTERNAL MEDICINE

## 2017-06-01 PROCEDURE — 83735 ASSAY OF MAGNESIUM: CPT

## 2017-06-01 PROCEDURE — 99233 SBSQ HOSP IP/OBS HIGH 50: CPT | Performed by: HOSPITALIST

## 2017-06-01 PROCEDURE — 84100 ASSAY OF PHOSPHORUS: CPT

## 2017-06-01 PROCEDURE — 770001 HCHG ROOM/CARE - MED/SURG/GYN PRIV*

## 2017-06-01 PROCEDURE — 700102 HCHG RX REV CODE 250 W/ 637 OVERRIDE(OP): Performed by: HOSPITALIST

## 2017-06-01 RX ADMIN — SULFASALAZINE 1000 MG: 500 TABLET ORAL at 08:56

## 2017-06-01 RX ADMIN — OXYCODONE HYDROCHLORIDE 10 MG: 10 TABLET ORAL at 16:13

## 2017-06-01 RX ADMIN — BUTALBITAL, ACETAMINOPHEN, AND CAFFEINE 1 TABLET: 50; 325; 40 TABLET ORAL at 19:54

## 2017-06-01 RX ADMIN — CEFUROXIME SODIUM 1500 MG: 7.5 INJECTION, POWDER, FOR SOLUTION INTRAVENOUS at 00:36

## 2017-06-01 RX ADMIN — GABAPENTIN 600 MG: 300 CAPSULE ORAL at 16:12

## 2017-06-01 RX ADMIN — HYDROMORPHONE HYDROCHLORIDE 1 MG: 1 INJECTION, SOLUTION INTRAMUSCULAR; INTRAVENOUS; SUBCUTANEOUS at 09:09

## 2017-06-01 RX ADMIN — OXYCODONE HYDROCHLORIDE 10 MG: 10 TABLET ORAL at 07:48

## 2017-06-01 RX ADMIN — RISPERIDONE 1 MG: 0.5 TABLET, FILM COATED ORAL at 19:54

## 2017-06-01 RX ADMIN — FERROUS GLUCONATE 324 MG: 324 TABLET ORAL at 16:12

## 2017-06-01 RX ADMIN — POTASSIUM CHLORIDE AND SODIUM CHLORIDE: 900; 150 INJECTION, SOLUTION INTRAVENOUS at 17:12

## 2017-06-01 RX ADMIN — DULOXETINE HYDROCHLORIDE 60 MG: 60 CAPSULE, DELAYED RELEASE ORAL at 19:54

## 2017-06-01 RX ADMIN — HYDROMORPHONE HYDROCHLORIDE 1 MG: 1 INJECTION, SOLUTION INTRAMUSCULAR; INTRAVENOUS; SUBCUTANEOUS at 17:09

## 2017-06-01 RX ADMIN — ZOLPIDEM TARTRATE 10 MG: 5 TABLET, FILM COATED ORAL at 19:54

## 2017-06-01 RX ADMIN — FERROUS GLUCONATE 324 MG: 324 TABLET ORAL at 08:56

## 2017-06-01 RX ADMIN — HYDROMORPHONE HYDROCHLORIDE 1 MG: 1 INJECTION, SOLUTION INTRAMUSCULAR; INTRAVENOUS; SUBCUTANEOUS at 05:09

## 2017-06-01 RX ADMIN — Medication 500 MG: at 08:55

## 2017-06-01 RX ADMIN — DULOXETINE HYDROCHLORIDE 60 MG: 60 CAPSULE, DELAYED RELEASE ORAL at 08:54

## 2017-06-01 RX ADMIN — RISPERIDONE 1 MG: 0.5 TABLET, FILM COATED ORAL at 08:55

## 2017-06-01 RX ADMIN — METHOTREXATE 20 MG: 2.5 TABLET ORAL at 08:57

## 2017-06-01 RX ADMIN — Medication 500 MG: at 16:12

## 2017-06-01 RX ADMIN — HYDROMORPHONE HYDROCHLORIDE 1 MG: 1 INJECTION, SOLUTION INTRAMUSCULAR; INTRAVENOUS; SUBCUTANEOUS at 00:36

## 2017-06-01 RX ADMIN — SULFASALAZINE 1000 MG: 500 TABLET ORAL at 19:58

## 2017-06-01 RX ADMIN — FOLIC ACID 1 MG: 1 TABLET ORAL at 08:54

## 2017-06-01 RX ADMIN — POTASSIUM CHLORIDE AND SODIUM CHLORIDE: 900; 150 INJECTION, SOLUTION INTRAVENOUS at 00:44

## 2017-06-01 RX ADMIN — DIPHENHYDRAMINE HYDROCHLORIDE 50 MG: 50 INJECTION, SOLUTION INTRAMUSCULAR; INTRAVENOUS at 05:08

## 2017-06-01 RX ADMIN — DIPHENHYDRAMINE HYDROCHLORIDE 50 MG: 50 INJECTION, SOLUTION INTRAMUSCULAR; INTRAVENOUS at 11:33

## 2017-06-01 RX ADMIN — PREDNISONE 10 MG: 10 TABLET ORAL at 08:56

## 2017-06-01 RX ADMIN — GABAPENTIN 600 MG: 300 CAPSULE ORAL at 08:56

## 2017-06-01 RX ADMIN — HYDROMORPHONE HYDROCHLORIDE 1 MG: 1 INJECTION, SOLUTION INTRAMUSCULAR; INTRAVENOUS; SUBCUTANEOUS at 12:59

## 2017-06-01 RX ADMIN — HYDROMORPHONE HYDROCHLORIDE 1 MG: 1 INJECTION, SOLUTION INTRAMUSCULAR; INTRAVENOUS; SUBCUTANEOUS at 22:28

## 2017-06-01 RX ADMIN — OXYCODONE HYDROCHLORIDE 10 MG: 10 TABLET ORAL at 21:29

## 2017-06-01 RX ADMIN — GABAPENTIN 600 MG: 300 CAPSULE ORAL at 19:55

## 2017-06-01 RX ADMIN — DIPHENHYDRAMINE HYDROCHLORIDE 50 MG: 50 INJECTION, SOLUTION INTRAMUSCULAR; INTRAVENOUS at 19:54

## 2017-06-01 ASSESSMENT — ENCOUNTER SYMPTOMS
ABDOMINAL PAIN: 0
HEADACHES: 1
SHORTNESS OF BREATH: 0
PALPITATIONS: 0
DIARRHEA: 0
CONSTIPATION: 0
MYALGIAS: 0
VOMITING: 0
FOCAL WEAKNESS: 0
COUGH: 0
CHILLS: 0
FEVER: 0
NAUSEA: 0
DIZZINESS: 0

## 2017-06-01 ASSESSMENT — PAIN SCALES - GENERAL
PAINLEVEL_OUTOF10: 9
PAINLEVEL_OUTOF10: 8
PAINLEVEL_OUTOF10: 9
PAINLEVEL_OUTOF10: ASSUMED PAIN PRESENT
PAINLEVEL_OUTOF10: 7
PAINLEVEL_OUTOF10: 8
PAINLEVEL_OUTOF10: 8
PAINLEVEL_OUTOF10: 9

## 2017-06-01 NOTE — CARE PLAN
Problem: Pain Management  Goal: Pain level will decrease to patient’s comfort goal  Medicated frequently for pain    Problem: Safety  Goal: Will remain free from injury  Safety precautions in place

## 2017-06-01 NOTE — PROGRESS NOTES
Hospital Medicine ICU Interval Note    Date of Service: 2017    Chief Complaint  Enedelia Pang is a 46 y.o. female w/ h/o pseudotumor cerebri, CVA, prior  shunt with infection thus removal admitted 2017 for migraine headache. Kristi consulted as second opinion and scheduled for  shunt     Interval Problem Update  Patient seen and examined today. ICU Care  Care and plan discussed in IDT/Hot rounds.  Lines and assistive devices reviewed.    Patient tolerating treatment and therapies.  All Data, Medication data reviewed.  Case discussed with nursing as available.  Plan of Care reviewed with patient and notified of changes.   Pt tolerated procedure well, post op pain, HA, denies vision changes or F/C   Pt improved, no further blurred vision, incisional pain, no other pain, denies F/C, per Nsx ok to downgrade/ mobilize    Consultants/Specialty  Luceroplalice, surgery  Keyvani, neurology    Disposition  Neuro unit          Review of Systems   Constitutional: Negative for fever and chills.   Respiratory: Negative for cough and shortness of breath.    Cardiovascular: Negative for chest pain and palpitations.   Gastrointestinal: Negative for nausea, vomiting, abdominal pain, diarrhea and constipation.   Genitourinary: Negative for dysuria.   Musculoskeletal: Negative for myalgias.   Skin: Negative for itching.   Neurological: Positive for headaches. Negative for dizziness and focal weakness.   All other systems reviewed and are negative.     Physical Exam  Laboratory/Imaging   Hemodynamics  Temp (24hrs), Av.1 °C (98.8 °F), Min:36.6 °C (97.9 °F), Max:37.7 °C (99.9 °F)   Temperature: 37.2 °C (98.9 °F)  Pulse  Av.4  Min: 78  Max: 150 Heart Rate (Monitored): (!) 107  NIBP: 111/58 mmHg      Respiratory      Respiration: 17, Pulse Oximetry: 91 %             Fluids       Nutrition  Orders Placed This Encounter   Procedures   • DIET ORDER     Standing Status: Standing      Number of Occurrences: 1       Standing Expiration Date:      Order Specific Question:  Diet:     Answer:  Regular [1]     Physical Exam   Constitutional: She is oriented to person, place, and time. She appears well-developed and well-nourished. No distress.   HENT:   Head: Normocephalic and atraumatic.   Right Ear: External ear normal.   Left Ear: External ear normal.   Nose: Nose normal.   Mouth/Throat: Oropharynx is clear and moist.   Post op changes, dressing intact   Eyes: Conjunctivae are normal. Pupils are equal, round, and reactive to light. Right eye exhibits no discharge. Left eye exhibits no discharge. No scleral icterus.   Neck: No JVD present.   Cardiovascular: Normal rate, regular rhythm, normal heart sounds and intact distal pulses.    No murmur heard.  Pulmonary/Chest: Effort normal and breath sounds normal. No stridor. No respiratory distress. She has no wheezes. She has no rales.   Abdominal: Soft. Bowel sounds are normal. She exhibits no distension. There is no tenderness.   Musculoskeletal: Normal range of motion. She exhibits no edema or tenderness.   Neurological: She is alert and oriented to person, place, and time.   Skin: Skin is warm and dry. She is not diaphoretic. No erythema.   Psychiatric: She has a normal mood and affect. Her behavior is normal.   Nursing note and vitals reviewed.      Recent Labs      05/31/17 0228  06/01/17   0500   WBC  9.0  9.1   RBC  3.94*  3.62*   HEMOGLOBIN  12.9  11.9*   HEMATOCRIT  39.7  36.6*   MCV  100.8*  101.1*   MCH  32.7  32.9   MCHC  32.5*  32.5*   RDW  51.8*  53.6*   PLATELETCT  349  303   MPV  9.4  9.5     Recent Labs      05/31/17 0228  06/01/17   0500   SODIUM  137  139   POTASSIUM  3.3*  3.6   CHLORIDE  107  108   CO2  23  23   GLUCOSE  94  91   BUN  14  12   CREATININE  0.67  0.55   CALCIUM  9.5  8.9                      Assessment/Plan     * Status migrainosus (present on admission)  Assessment & Plan    Appreciate neurology consult, Dr Ibarra- signed off  Now s/p   shunt placement    Anxiety (present on admission)  Assessment & Plan  S/p psychiatry consult, Dr Puga; denies SI   Ativan prn  Cymbalta      Diarrhea (present on admission)  Assessment & Plan  Chronic, no acute management    LFT elevation (present on admission)  Assessment & Plan  Trending, still elevated, though no major change  If worsened stop methotrexate per rheumatology    Whole body pain (present on admission)  Assessment & Plan  Outpatient w/u    Chronic pain (present on admission)  Assessment & Plan  Avoid escalation in meds.     Rheumatoid arthritis (CMS-HCC) (present on admission)  Assessment & Plan   O/P workup, on current tentative TX    Plan  Ok for transfer to neuro  Pt/Ot  Pain control  C/w supportive Rx and TX  see orders  F/u CT as per Nsx  Pt is critically ill in ICU, but stable for downgrade  Time spent 35 min, no overlap  Labs reviewed, Medications reviewed, EKG reviewed and Radiology images reviewed  Lopez catheter: No Lopez      DVT Prophylaxis: Enoxaparin (Lovenox)    Ulcer prophylaxis: Not indicated    Assessed for rehab: Patient was assess for and/or received rehabilitation services during this hospitalization

## 2017-06-01 NOTE — CARE PLAN
Problem: Communication  Goal: The ability to communicate needs accurately and effectively will improve  Outcome: PROGRESSING AS EXPECTED  Patient communicates needs accurately and effectively.

## 2017-06-01 NOTE — CARE PLAN
Problem: Pain Management  Goal: Pain level will decrease to patient’s comfort goal  Outcome: PROGRESSING SLOWER THAN EXPECTED  No decrease in pain yet since surgery.

## 2017-06-01 NOTE — PROGRESS NOTES
Subjective:  States some HA.  Vision improved some.  No new symptoms  Obj:  AAOx4. Tongue ML. No drift. FCx4.  CDI       Temp (24hrs), Av.1 °C (98.8 °F), Min:36.6 °C (97.9 °F), Max:37.7 °C (99.9 °F)    Pulse: (!) 118, Heart Rate (Monitored): (!) 112, Respiration: 15, NIBP: 105/65 mmHg, Weight: 75.1 kg (165 lb 9.1 oz), Pulse Oximetry: 94 %, O2 (LPM): 3       Date 17 0700 - 17 0659   Shift 0783-0395 [id number] 1809-4853 24 Hour Total   I  N  T  A  K  E   P.O. 30   30      P.O. 30   30    I.V. 300   300      IV Volume (NS + 20K) 300   300    Enteral 200   200      Free Water / Tube Flush 200   200    Shift Total 530   530   O  U  T  P  U  T   Shift Total          530         Intake/Output Summary (Last 24 hours) at 17 1201  Last data filed at 17 1000   Gross per 24 hour   Intake   3060 ml   Output   3000 ml   Net     60 ml            • Pharmacy Consult Request  1 Each     • MD ALERT...Do not administer NSAIDS or ASPIRIN unless ORDERED By Neurosurgery  1 Each     • artificial tear ointment  1 Application     • senna-docusate  2 Tab     • magnesium hydroxide  30 mL     • bisacodyl  10 mg     • fleet  1 Each     • 0.9 % NaCl with KCl 20 mEq 1,000 mL   75 mL/hr at 17 0044   • ondansetron  4 mg     • promethazine  12.5 mg     • metoclopramide  5 mg     • labetalol  10 mg     • hydrALAZINE  10 mg     • clonidine  0.1 mg     • hydromorphone  1 mg     • risperidone  1 mg     • oxycodone immediate release  10 mg     • zolpidem  10 mg     • sodium chloride  2 Spray     • acetaminophen/caffeine/butalbital 325-40-50 mg  1 Tab     • diphenhydrAMINE  50 mg     • calcium carbonate  500 mg     • duloxetine  60 mg     • ferrous gluconate  324 mg     • folic acid  1 mg     • gabapentin  600 mg     • methotrexate  20 mg     • predniSONE  10 mg     • sulfaSALAzine  1,000 mg     • lorazepam  0.5 mg      Or   • lorazepam  0.5 mg     • promethazine  25 mg         Recent Labs      17   0224   06/01/17   0500   WBC  9.0  9.1   RBC  3.94*  3.62*   HEMOGLOBIN  12.9  11.9*   HEMATOCRIT  39.7  36.6*   MCV  100.8*  101.1*   MCH  32.7  32.9   PLATELETCT  349  303     Recent Labs      05/31/17   0228  06/01/17   0500   SODIUM  137  139   POTASSIUM  3.3*  3.6   CHLORIDE  107  108   CO2  23  23   GLUCOSE  94  91   BUN  14  12   CREATININE  0.67  0.55   CALCIUM  9.5  8.9           Assessment:  POD#1  Shunt    Plan:  CT stable  Continue pain control  Ok to floor

## 2017-06-01 NOTE — PROGRESS NOTES
Report called to nurse for S175, all belongings given to patients , patient taken to new room via wheelchair in stable condition.

## 2017-06-02 ENCOUNTER — PATIENT OUTREACH (OUTPATIENT)
Dept: HEALTH INFORMATION MANAGEMENT | Facility: OTHER | Age: 47
End: 2017-06-02

## 2017-06-02 LAB
ALBUMIN SERPL BCP-MCNC: 3.5 G/DL (ref 3.2–4.9)
ALBUMIN/GLOB SERPL: 1.3 G/DL
ALP SERPL-CCNC: 184 U/L (ref 30–99)
ALT SERPL-CCNC: 407 U/L (ref 2–50)
ANION GAP SERPL CALC-SCNC: 8 MMOL/L (ref 0–11.9)
AST SERPL-CCNC: 94 U/L (ref 12–45)
BILIRUB SERPL-MCNC: 0.4 MG/DL (ref 0.1–1.5)
BUN SERPL-MCNC: 15 MG/DL (ref 8–22)
CALCIUM SERPL-MCNC: 8.5 MG/DL (ref 8.5–10.5)
CHLORIDE SERPL-SCNC: 109 MMOL/L (ref 96–112)
CO2 SERPL-SCNC: 22 MMOL/L (ref 20–33)
CREAT SERPL-MCNC: 0.41 MG/DL (ref 0.5–1.4)
GFR SERPL CREATININE-BSD FRML MDRD: >60 ML/MIN/1.73 M 2
GLOBULIN SER CALC-MCNC: 2.7 G/DL (ref 1.9–3.5)
GLUCOSE SERPL-MCNC: 104 MG/DL (ref 65–99)
POTASSIUM SERPL-SCNC: 3.5 MMOL/L (ref 3.6–5.5)
PROT SERPL-MCNC: 6.2 G/DL (ref 6–8.2)
SODIUM SERPL-SCNC: 139 MMOL/L (ref 135–145)

## 2017-06-02 PROCEDURE — A9270 NON-COVERED ITEM OR SERVICE: HCPCS | Performed by: PSYCHIATRY & NEUROLOGY

## 2017-06-02 PROCEDURE — 99232 SBSQ HOSP IP/OBS MODERATE 35: CPT | Performed by: HOSPITALIST

## 2017-06-02 PROCEDURE — 97161 PT EVAL LOW COMPLEX 20 MIN: CPT

## 2017-06-02 PROCEDURE — 700111 HCHG RX REV CODE 636 W/ 250 OVERRIDE (IP): Performed by: NURSE PRACTITIONER

## 2017-06-02 PROCEDURE — G8987 SELF CARE CURRENT STATUS: HCPCS | Mod: CI

## 2017-06-02 PROCEDURE — G8988 SELF CARE GOAL STATUS: HCPCS | Mod: CI

## 2017-06-02 PROCEDURE — A9270 NON-COVERED ITEM OR SERVICE: HCPCS | Performed by: CLINICAL NURSE SPECIALIST

## 2017-06-02 PROCEDURE — G8979 MOBILITY GOAL STATUS: HCPCS | Mod: CI

## 2017-06-02 PROCEDURE — 700101 HCHG RX REV CODE 250: Performed by: CLINICAL NURSE SPECIALIST

## 2017-06-02 PROCEDURE — 700102 HCHG RX REV CODE 250 W/ 637 OVERRIDE(OP): Performed by: NURSE PRACTITIONER

## 2017-06-02 PROCEDURE — G8978 MOBILITY CURRENT STATUS: HCPCS | Mod: CI

## 2017-06-02 PROCEDURE — 770001 HCHG ROOM/CARE - MED/SURG/GYN PRIV*

## 2017-06-02 PROCEDURE — 700111 HCHG RX REV CODE 636 W/ 250 OVERRIDE (IP): Performed by: HOSPITALIST

## 2017-06-02 PROCEDURE — 700102 HCHG RX REV CODE 250 W/ 637 OVERRIDE(OP): Performed by: HOSPITALIST

## 2017-06-02 PROCEDURE — 700102 HCHG RX REV CODE 250 W/ 637 OVERRIDE(OP): Performed by: CLINICAL NURSE SPECIALIST

## 2017-06-02 PROCEDURE — 97165 OT EVAL LOW COMPLEX 30 MIN: CPT

## 2017-06-02 PROCEDURE — 36415 COLL VENOUS BLD VENIPUNCTURE: CPT

## 2017-06-02 PROCEDURE — 700102 HCHG RX REV CODE 250 W/ 637 OVERRIDE(OP): Performed by: PSYCHIATRY & NEUROLOGY

## 2017-06-02 PROCEDURE — 80053 COMPREHEN METABOLIC PANEL: CPT

## 2017-06-02 PROCEDURE — G8989 SELF CARE D/C STATUS: HCPCS | Mod: CI

## 2017-06-02 PROCEDURE — A9270 NON-COVERED ITEM OR SERVICE: HCPCS | Performed by: HOSPITALIST

## 2017-06-02 PROCEDURE — A9270 NON-COVERED ITEM OR SERVICE: HCPCS | Performed by: NURSE PRACTITIONER

## 2017-06-02 RX ADMIN — FERROUS GLUCONATE 324 MG: 324 TABLET ORAL at 17:29

## 2017-06-02 RX ADMIN — PREDNISONE 10 MG: 10 TABLET ORAL at 08:01

## 2017-06-02 RX ADMIN — DIPHENHYDRAMINE HYDROCHLORIDE 50 MG: 50 INJECTION, SOLUTION INTRAMUSCULAR; INTRAVENOUS at 20:31

## 2017-06-02 RX ADMIN — HYDROMORPHONE HYDROCHLORIDE 1 MG: 1 INJECTION, SOLUTION INTRAMUSCULAR; INTRAVENOUS; SUBCUTANEOUS at 18:38

## 2017-06-02 RX ADMIN — HYDROMORPHONE HYDROCHLORIDE 1 MG: 1 INJECTION, SOLUTION INTRAMUSCULAR; INTRAVENOUS; SUBCUTANEOUS at 18:36

## 2017-06-02 RX ADMIN — GABAPENTIN 600 MG: 300 CAPSULE ORAL at 20:30

## 2017-06-02 RX ADMIN — OXYCODONE HYDROCHLORIDE 10 MG: 10 TABLET ORAL at 22:45

## 2017-06-02 RX ADMIN — GABAPENTIN 600 MG: 300 CAPSULE ORAL at 08:01

## 2017-06-02 RX ADMIN — HYDROMORPHONE HYDROCHLORIDE 1 MG: 1 INJECTION, SOLUTION INTRAMUSCULAR; INTRAVENOUS; SUBCUTANEOUS at 14:34

## 2017-06-02 RX ADMIN — RISPERIDONE 1 MG: 0.5 TABLET, FILM COATED ORAL at 08:01

## 2017-06-02 RX ADMIN — POTASSIUM CHLORIDE AND SODIUM CHLORIDE: 900; 150 INJECTION, SOLUTION INTRAVENOUS at 22:46

## 2017-06-02 RX ADMIN — OXYCODONE HYDROCHLORIDE 10 MG: 10 TABLET ORAL at 04:49

## 2017-06-02 RX ADMIN — SULFASALAZINE 1000 MG: 500 TABLET ORAL at 22:54

## 2017-06-02 RX ADMIN — DIPHENHYDRAMINE HYDROCHLORIDE 50 MG: 50 INJECTION, SOLUTION INTRAMUSCULAR; INTRAVENOUS at 12:36

## 2017-06-02 RX ADMIN — HYDROMORPHONE HYDROCHLORIDE 1 MG: 1 INJECTION, SOLUTION INTRAMUSCULAR; INTRAVENOUS; SUBCUTANEOUS at 10:22

## 2017-06-02 RX ADMIN — Medication 500 MG: at 17:29

## 2017-06-02 RX ADMIN — DIPHENHYDRAMINE HYDROCHLORIDE 50 MG: 50 INJECTION, SOLUTION INTRAMUSCULAR; INTRAVENOUS at 04:49

## 2017-06-02 RX ADMIN — HYDROMORPHONE HYDROCHLORIDE 1 MG: 1 INJECTION, SOLUTION INTRAMUSCULAR; INTRAVENOUS; SUBCUTANEOUS at 22:45

## 2017-06-02 RX ADMIN — SULFASALAZINE 1000 MG: 500 TABLET ORAL at 08:05

## 2017-06-02 RX ADMIN — HYDROMORPHONE HYDROCHLORIDE 1 MG: 1 INJECTION, SOLUTION INTRAMUSCULAR; INTRAVENOUS; SUBCUTANEOUS at 05:57

## 2017-06-02 RX ADMIN — POTASSIUM CHLORIDE AND SODIUM CHLORIDE: 900; 150 INJECTION, SOLUTION INTRAVENOUS at 09:16

## 2017-06-02 RX ADMIN — STANDARDIZED SENNA CONCENTRATE AND DOCUSATE SODIUM 2 TABLET: 8.6; 5 TABLET, FILM COATED ORAL at 08:01

## 2017-06-02 RX ADMIN — RISPERIDONE 1 MG: 0.5 TABLET, FILM COATED ORAL at 20:30

## 2017-06-02 RX ADMIN — DULOXETINE HYDROCHLORIDE 60 MG: 60 CAPSULE, DELAYED RELEASE ORAL at 20:30

## 2017-06-02 RX ADMIN — Medication 500 MG: at 08:01

## 2017-06-02 RX ADMIN — FERROUS GLUCONATE 324 MG: 324 TABLET ORAL at 08:01

## 2017-06-02 RX ADMIN — OXYCODONE HYDROCHLORIDE 10 MG: 10 TABLET ORAL at 09:14

## 2017-06-02 RX ADMIN — OXYCODONE HYDROCHLORIDE 10 MG: 10 TABLET ORAL at 17:29

## 2017-06-02 RX ADMIN — GABAPENTIN 600 MG: 300 CAPSULE ORAL at 14:34

## 2017-06-02 RX ADMIN — FOLIC ACID 1 MG: 1 TABLET ORAL at 08:01

## 2017-06-02 RX ADMIN — DULOXETINE HYDROCHLORIDE 60 MG: 60 CAPSULE, DELAYED RELEASE ORAL at 08:01

## 2017-06-02 ASSESSMENT — ENCOUNTER SYMPTOMS
NECK PAIN: 0
BACK PAIN: 0
EYE PAIN: 0
CONSTIPATION: 0
CHILLS: 0
ABDOMINAL PAIN: 0
BLURRED VISION: 0
DEPRESSION: 0
SHORTNESS OF BREATH: 0
NAUSEA: 0
DIZZINESS: 0
FEVER: 0
INSOMNIA: 0
PALPITATIONS: 0
FOCAL WEAKNESS: 0
HEADACHES: 1
SORE THROAT: 0
VOMITING: 0
MYALGIAS: 0
COUGH: 0
TINGLING: 0

## 2017-06-02 ASSESSMENT — PAIN SCALES - GENERAL
PAINLEVEL_OUTOF10: 8
PAINLEVEL_OUTOF10: 8
PAINLEVEL_OUTOF10: 4
PAINLEVEL_OUTOF10: 6
PAINLEVEL_OUTOF10: 8
PAINLEVEL_OUTOF10: 7
PAINLEVEL_OUTOF10: 8
PAINLEVEL_OUTOF10: 6
PAINLEVEL_OUTOF10: 8

## 2017-06-02 ASSESSMENT — ACTIVITIES OF DAILY LIVING (ADL): TOILETING: INDEPENDENT

## 2017-06-02 ASSESSMENT — COGNITIVE AND FUNCTIONAL STATUS - GENERAL
TOILETING: A LITTLE
HELP NEEDED FOR BATHING: A LITTLE
SUGGESTED CMS G CODE MODIFIER MOBILITY: CH
DRESSING REGULAR UPPER BODY CLOTHING: A LITTLE
PERSONAL GROOMING: A LITTLE
MOBILITY SCORE: 24
DRESSING REGULAR LOWER BODY CLOTHING: A LITTLE
DAILY ACTIVITIY SCORE: 19
SUGGESTED CMS G CODE MODIFIER DAILY ACTIVITY: CK

## 2017-06-02 ASSESSMENT — GAIT ASSESSMENTS
ASSISTIVE DEVICE: OTHER (COMMENTS)
GAIT LEVEL OF ASSIST: MODIFIED INDEPENDENT

## 2017-06-02 NOTE — PROGRESS NOTES
Please have patient followup with me as outpatient to discuss occipital nerve stimulator removal, if she still desires that procedure. Shunt care per Dr Berry.

## 2017-06-02 NOTE — PROGRESS NOTES
Subjective:  HA vision improve today. No new symptoms  Obj:  AAOx4. Tongue ML. No drift. FCx4.  CDI staple sites x3.      Temp (24hrs), Av.4 °C (97.6 °F), Min:36.2 °C (97.1 °F), Max:36.7 °C (98 °F)    Pulse: 85, Heart Rate (Monitored): (!) 103, Respiration: 16, NIBP: 102/57 mmHg, Blood Pressure: 104/62 mmHg, Pulse Oximetry: 94 %, O2 (LPM): 0            Intake/Output Summary (Last 24 hours) at 17 1201  Last data filed at 17 1000   Gross per 24 hour   Intake   3060 ml   Output   3000 ml   Net     60 ml            • Pharmacy Consult Request  1 Each     • MD ALERT...Do not administer NSAIDS or ASPIRIN unless ORDERED By Neurosurgery  1 Each     • artificial tear ointment  1 Application     • senna-docusate  2 Tab     • magnesium hydroxide  30 mL     • bisacodyl  10 mg     • fleet  1 Each     • 0.9 % NaCl with KCl 20 mEq 1,000 mL   75 mL/hr at 17 0916   • ondansetron  4 mg     • promethazine  12.5 mg     • metoclopramide  5 mg     • labetalol  10 mg     • hydrALAZINE  10 mg     • clonidine  0.1 mg     • hydromorphone  1 mg     • risperidone  1 mg     • oxycodone immediate release  10 mg     • zolpidem  10 mg     • sodium chloride  2 Spray     • acetaminophen/caffeine/butalbital 325-40-50 mg  1 Tab     • diphenhydrAMINE  50 mg     • calcium carbonate  500 mg     • duloxetine  60 mg     • ferrous gluconate  324 mg     • folic acid  1 mg     • gabapentin  600 mg     • predniSONE  10 mg     • sulfaSALAzine  1,000 mg     • lorazepam  0.5 mg      Or   • lorazepam  0.5 mg     • promethazine  25 mg         Recent Labs      17   0500   WBC  9.0  9.1   RBC  3.94*  3.62*   HEMOGLOBIN  12.9  11.9*   HEMATOCRIT  39.7  36.6*   MCV  100.8*  101.1*   MCH  32.7  32.9   PLATELETCT  349  303     Recent Labs      178  17   0500  17   0248   SODIUM  137  139  139   POTASSIUM  3.3*  3.6  3.5*   CHLORIDE  107  108  109   CO2  23  23  22   GLUCOSE  94  91  104*   BUN  14  12   15   CREATININE  0.67  0.55  0.41*   CALCIUM  9.5  8.9  8.5           Assessment:  POD#2  Shunt    Plan:  CT stable  Continue pain control  Ok to dc per med when stable/cleared- follow up in two weeks

## 2017-06-02 NOTE — CARE PLAN
Problem: Pain Management  Goal: Pain level will decrease to patient’s comfort goal  Outcome: PROGRESSING AS EXPECTED  Pain managed well with PRN medication at this time.    Problem: Safety  Goal: Will remain free from injury  Outcome: PROGRESSING AS EXPECTED  Call light within reach, hourly rounding in practice.

## 2017-06-02 NOTE — PROGRESS NOTES
Enedelia Pang is AO4 and refused bed alarm despite education. Instructed pt to call for assistance before getting OOB. Pt verbalized understanding. Call light w/in reach. Bed is in low position. Treaded slippers on patient. foresee-able fall precautions in place. Patient up ad aditya

## 2017-06-02 NOTE — THERAPY
"Physical Therapy Evaluation completed.   Bed Mobility:  Supine to Sit: Modified Independent  Transfers: Sit to Stand: Supervised  Gait: Level Of Assist: Modified Independent with No Equipment Needed       Plan of Care: Will benefit from Physical Therapy 1 more session  Discharge Recommendations: Equipment: No Equipment Needed.   Pt presents with impaired activity tolerance and right hand strength associated with current medical status. Pt is functionally mobilizing close to baseline, doing multiple loops around unit with  without symptoms. Greatest complaint is right hand grasp and possible arthritic changes. Discussed H tapping and follow up with outpt hand therapy via occupational therapy as she reports difficulties with dressing activities as well. Will see one more session while in house to ensure educational carryover however anticipate can return home when medically appropriate to do so.   See \"Rehab Therapy-Acute\" Patient Summary Report for complete documentation.     "

## 2017-06-02 NOTE — PROGRESS NOTES
Pt at risk for fall d/t impaired mobility but refuses bed alarm despite education. Educated about call light use, call light within reach, bed locked and in lowest position.

## 2017-06-02 NOTE — PROGRESS NOTES
"Report received from Darleen FIGUEROA.    Last documented VS: /67 mmHg  Pulse 81  Temp(Src) 36.7 °C (98 °F)  Resp 14  Ht 1.6 m (5' 3\")  Wt 75.1 kg (165 lb 9.1 oz)  BMI 29.34 kg/m2  SpO2 95%  LMP 05/09/2017  Breastfeeding? No      no signs of acute distress, patient appears to be in stable condition. Enedelia Pang assessed after assuming care.       Mobility: Patient in bed at this time    Fall precautions in place. Hourly rounding in place and explained to Enedelia. Educated Enedelia on call light use as well as phone instructions to call RN or CNA directly. Possessions within patient's reach, fall precautions in place. Pressure ulcer prevention techniques in use, pressure points assessed and pressure relieved from vulnerable areas. Pillows uses copiously for support and positioning where applicable.    All prudent patient safety measures and applicable nursing interventions taken.    Enedelia educated on Pain, Position, Potty, Priorities and instructed to notify RN if anything additional can be done to make stay more comfortable including bathing options, linen change, and toiletries.    PMHx on file (click to expand)  Past Medical History   Diagnosis Date   • Migraine with aura, with intractable migraine, so stated, without mention of status migrainosus    • H/O Clostridium difficile infection    • Hydrocephalus      with lumbar shunt   • Hx MRSA infection    • Pituitary abnormality (CMS-HCC)    • Allergy, unspecified not elsewhere classified    • Anxiety    • Depression    • Stroke (CMS-HCC)      2007   • autoimmune           Care plan:     Problem: Safety   Goal: Will remain free from falls   Outcome: PROGRESSING as EXPECTED   Enedelia educated about fall risk. Will remain free from injury or falls.  Appropriate side rails up. Bed in low position, call light and possions within  reach, bed alarm in use. Hourly rounding in place.     Problem: Pain Management   Goal: Pain level will decrease to patient’s comfort " goal   Outcome: PROGRESSING as EXPECTED   Following pain management plan, Enedelia reports pain on 0-10 scale      *addendum to follow below at end or throughout shift if significant events occurred: if blank n/a    OVERNIGHT SIGNIFICANT EVENTS:    n/a

## 2017-06-02 NOTE — THERAPY
"Occupational Therapy Evaluation completed.   Functional Status:  Mod I/Supervision  Plan of Care: Patient with no further skilled OT needs in the acute care setting at this time  Discharge Recommendations:  Equipment: No Equipment Needed. Post-acute therapy Discharge to home with outpatient or home health for additional skilled therapy services.    Patient presents s/p migraines, diarrhea, and right frontal ventricular to peritoneal shunt revision. Patient and visitor report and patient demos Mod I/ Supervision with ADLs and mobility. Patient has no further skilled OT needs in this setting at this time. DC home with services related to work hardening as needed. Eval only.     See \"Rehab Therapy-Acute\" Patient Summary Report for complete documentation.    "

## 2017-06-03 VITALS
HEART RATE: 89 BPM | HEIGHT: 63 IN | TEMPERATURE: 97.9 F | RESPIRATION RATE: 16 BRPM | DIASTOLIC BLOOD PRESSURE: 54 MMHG | OXYGEN SATURATION: 92 % | WEIGHT: 165.57 LBS | BODY MASS INDEX: 29.34 KG/M2 | SYSTOLIC BLOOD PRESSURE: 102 MMHG

## 2017-06-03 LAB
ALBUMIN SERPL BCP-MCNC: 3.8 G/DL (ref 3.2–4.9)
ALBUMIN/GLOB SERPL: 1.5 G/DL
ALP SERPL-CCNC: 195 U/L (ref 30–99)
ALT SERPL-CCNC: 345 U/L (ref 2–50)
ANION GAP SERPL CALC-SCNC: 7 MMOL/L (ref 0–11.9)
AST SERPL-CCNC: 75 U/L (ref 12–45)
BASOPHILS # BLD AUTO: 0.8 % (ref 0–1.8)
BASOPHILS # BLD: 0.07 K/UL (ref 0–0.12)
BILIRUB SERPL-MCNC: 0.3 MG/DL (ref 0.1–1.5)
BUN SERPL-MCNC: 13 MG/DL (ref 8–22)
CALCIUM SERPL-MCNC: 9.2 MG/DL (ref 8.5–10.5)
CHLORIDE SERPL-SCNC: 109 MMOL/L (ref 96–112)
CO2 SERPL-SCNC: 21 MMOL/L (ref 20–33)
CREAT SERPL-MCNC: 0.52 MG/DL (ref 0.5–1.4)
EOSINOPHIL # BLD AUTO: 0.02 K/UL (ref 0–0.51)
EOSINOPHIL NFR BLD: 0.2 % (ref 0–6.9)
ERYTHROCYTE [DISTWIDTH] IN BLOOD BY AUTOMATED COUNT: 50.4 FL (ref 35.9–50)
GFR SERPL CREATININE-BSD FRML MDRD: >60 ML/MIN/1.73 M 2
GLOBULIN SER CALC-MCNC: 2.5 G/DL (ref 1.9–3.5)
GLUCOSE SERPL-MCNC: 89 MG/DL (ref 65–99)
HCT VFR BLD AUTO: 38.5 % (ref 37–47)
HGB BLD-MCNC: 12.5 G/DL (ref 12–16)
IMM GRANULOCYTES # BLD AUTO: 0.07 K/UL (ref 0–0.11)
IMM GRANULOCYTES NFR BLD AUTO: 0.8 % (ref 0–0.9)
LYMPHOCYTES # BLD AUTO: 2.15 K/UL (ref 1–4.8)
LYMPHOCYTES NFR BLD: 24.3 % (ref 22–41)
MCH RBC QN AUTO: 32.8 PG (ref 27–33)
MCHC RBC AUTO-ENTMCNC: 32.5 G/DL (ref 33.6–35)
MCV RBC AUTO: 101 FL (ref 81.4–97.8)
MONOCYTES # BLD AUTO: 0.66 K/UL (ref 0–0.85)
MONOCYTES NFR BLD AUTO: 7.5 % (ref 0–13.4)
NEUTROPHILS # BLD AUTO: 5.87 K/UL (ref 2–7.15)
NEUTROPHILS NFR BLD: 66.4 % (ref 44–72)
NRBC # BLD AUTO: 0 K/UL
NRBC BLD AUTO-RTO: 0 /100 WBC
PLATELET # BLD AUTO: 324 K/UL (ref 164–446)
PMV BLD AUTO: 9.5 FL (ref 9–12.9)
POTASSIUM SERPL-SCNC: 3.6 MMOL/L (ref 3.6–5.5)
PROT SERPL-MCNC: 6.3 G/DL (ref 6–8.2)
RBC # BLD AUTO: 3.81 M/UL (ref 4.2–5.4)
SODIUM SERPL-SCNC: 137 MMOL/L (ref 135–145)
WBC # BLD AUTO: 8.8 K/UL (ref 4.8–10.8)

## 2017-06-03 PROCEDURE — 700111 HCHG RX REV CODE 636 W/ 250 OVERRIDE (IP): Performed by: NURSE PRACTITIONER

## 2017-06-03 PROCEDURE — 85025 COMPLETE CBC W/AUTO DIFF WBC: CPT

## 2017-06-03 PROCEDURE — 99239 HOSP IP/OBS DSCHRG MGMT >30: CPT | Performed by: HOSPITALIST

## 2017-06-03 PROCEDURE — 36415 COLL VENOUS BLD VENIPUNCTURE: CPT

## 2017-06-03 PROCEDURE — 700102 HCHG RX REV CODE 250 W/ 637 OVERRIDE(OP): Performed by: HOSPITALIST

## 2017-06-03 PROCEDURE — A9270 NON-COVERED ITEM OR SERVICE: HCPCS | Performed by: PSYCHIATRY & NEUROLOGY

## 2017-06-03 PROCEDURE — A9270 NON-COVERED ITEM OR SERVICE: HCPCS | Performed by: HOSPITALIST

## 2017-06-03 PROCEDURE — 80053 COMPREHEN METABOLIC PANEL: CPT

## 2017-06-03 PROCEDURE — A9270 NON-COVERED ITEM OR SERVICE: HCPCS | Performed by: CLINICAL NURSE SPECIALIST

## 2017-06-03 PROCEDURE — 700111 HCHG RX REV CODE 636 W/ 250 OVERRIDE (IP): Performed by: HOSPITALIST

## 2017-06-03 PROCEDURE — 700102 HCHG RX REV CODE 250 W/ 637 OVERRIDE(OP): Performed by: PSYCHIATRY & NEUROLOGY

## 2017-06-03 PROCEDURE — 700102 HCHG RX REV CODE 250 W/ 637 OVERRIDE(OP): Performed by: CLINICAL NURSE SPECIALIST

## 2017-06-03 RX ADMIN — DIPHENHYDRAMINE HYDROCHLORIDE 50 MG: 50 INJECTION, SOLUTION INTRAMUSCULAR; INTRAVENOUS at 11:12

## 2017-06-03 RX ADMIN — STANDARDIZED SENNA CONCENTRATE AND DOCUSATE SODIUM 2 TABLET: 8.6; 5 TABLET, FILM COATED ORAL at 09:42

## 2017-06-03 RX ADMIN — HYDROMORPHONE HYDROCHLORIDE 1 MG: 1 INJECTION, SOLUTION INTRAMUSCULAR; INTRAVENOUS; SUBCUTANEOUS at 11:07

## 2017-06-03 RX ADMIN — SULFASALAZINE 1000 MG: 500 TABLET ORAL at 09:42

## 2017-06-03 RX ADMIN — GABAPENTIN 600 MG: 300 CAPSULE ORAL at 09:41

## 2017-06-03 RX ADMIN — HYDROMORPHONE HYDROCHLORIDE 1 MG: 1 INJECTION, SOLUTION INTRAMUSCULAR; INTRAVENOUS; SUBCUTANEOUS at 02:54

## 2017-06-03 RX ADMIN — Medication 500 MG: at 09:41

## 2017-06-03 RX ADMIN — FOLIC ACID 1 MG: 1 TABLET ORAL at 09:41

## 2017-06-03 RX ADMIN — DULOXETINE HYDROCHLORIDE 60 MG: 60 CAPSULE, DELAYED RELEASE ORAL at 09:41

## 2017-06-03 RX ADMIN — FERROUS GLUCONATE 324 MG: 324 TABLET ORAL at 09:41

## 2017-06-03 RX ADMIN — HYDROMORPHONE HYDROCHLORIDE 1 MG: 1 INJECTION, SOLUTION INTRAMUSCULAR; INTRAVENOUS; SUBCUTANEOUS at 06:57

## 2017-06-03 RX ADMIN — LORAZEPAM 0.5 MG: 1 TABLET ORAL at 02:54

## 2017-06-03 RX ADMIN — PREDNISONE 10 MG: 10 TABLET ORAL at 09:42

## 2017-06-03 RX ADMIN — DIPHENHYDRAMINE HYDROCHLORIDE 50 MG: 50 INJECTION, SOLUTION INTRAMUSCULAR; INTRAVENOUS at 02:54

## 2017-06-03 RX ADMIN — RISPERIDONE 1 MG: 0.5 TABLET, FILM COATED ORAL at 09:42

## 2017-06-03 ASSESSMENT — PAIN SCALES - GENERAL
PAINLEVEL_OUTOF10: 7
PAINLEVEL_OUTOF10: 7
PAINLEVEL_OUTOF10: 4
PAINLEVEL_OUTOF10: 4
PAINLEVEL_OUTOF10: 3
PAINLEVEL_OUTOF10: 8

## 2017-06-03 ASSESSMENT — LIFESTYLE VARIABLES: EVER_SMOKED: NEVER

## 2017-06-03 NOTE — PROGRESS NOTES
Patient discharged home.  Transport escorted her in wheelchair to [facility]. Discharge paperwork with patient.

## 2017-06-03 NOTE — PROGRESS NOTES
Pt. Is AAOx4  Pt. Moves all extremeties,  Denies numbness and tingling  Complains of pain at abdominal incision and at head  IV dilaudid, oxycodone, and benadryl given for pain  Pt. Up with no assistance  PIV in [employer], site CDI  Bed alarm refused, education provided  SCD's refused, education provided  Treaded socks in place  Call light within reach  Pt. Calls appropriately

## 2017-06-03 NOTE — PROGRESS NOTES
AOx4  Denies numbness and tingling  Patient cannot form tight fist with right hand, thumb, pinky and index finger d not close all the way.  Complains of pain at abdominal incision  Patient states headache has decreased and vision has improved.  Up with no assistance  SCD's refused, education provided

## 2017-06-03 NOTE — DISCHARGE INSTRUCTIONS
Discharge Instructions    Discharged to home by car with relative. Discharged via wheelchair, hospital escort: Yes.  Special equipment needed: Not Applicable    Be sure to schedule a follow-up appointment with your primary care doctor or any specialists as instructed.     Discharge Plan:   Diet Plan: Discussed  Activity Level: Discussed  Confirmed Follow up Appointment: Appointment Scheduled  Confirmed Symptoms Management: Discussed  Medication Reconciliation Updated: Yes  Influenza Vaccine Indication: Patient Refuses    I understand that a diet low in cholesterol, fat, and sodium is recommended for good health. Unless I have been given specific instructions below for another diet, I accept this instruction as my diet prescription.   Other diet: Regular    Special Instructions: Followup with me as outpatient to discuss occipital nerve stimulator removal, if she still desires that procedure.    · Is patient discharged on Warfarin / Coumadin?   No     · Is patient Post Blood Transfusion?  No      Ventriculoperitoneal Shunt Placement, Care After  Refer to this sheet in the next few weeks. These instructions provide you with information about caring for yourself after your procedure. Your health care provider may also give you more specific instructions. Your treatment has been planned according to current medical practices, but problems sometimes occur. Call your health care provider if you have any problems or questions after your procedure.  WHAT TO EXPECT AFTER THE PROCEDURE  After your procedure, it is typical to have the following:  · Swelling in the ventriculoperitoneal () shunt placement area.  · Soreness near your scalp or abdominal incision.  · Soreness in your neck and chest on the side of your  shunt.  HOME CARE INSTRUCTIONS  · Take medicines only as directed by your health care provider.  · Do not take baths, swim, or use a hot tub until your health care provider approves.  · Rest often. Your body needs  time to adjust to the  shunt.  · If you have a programmable shunt and need an MRI, it is very important to see your surgeon to have your shunt reprogrammed before the procedure. Many programmable shunts are sensitive to magnets, which are involved in MRIs.  · There are many different ways to close and cover an incision, including stitches (sutures), skin glue, and adhesive strips. Follow your health care provider's instructions on:  · Incision care.  · Bandage (dressing) changes and removal.  · Incision closure removal.  · Keep all follow-up visits as directed by your health care provider. This is important.  SEEK MEDICAL CARE IF:  · You have a poor appetite.  · You have low energy.  · You feel restless, confused, or irritable.  SEEK IMMEDIATE MEDICAL CARE IF:  · You have drainage from an incision site.  · An incision site starts to get red, warm, swollen, or tender.    · An incision site opens up.  · You have any signs or symptoms of shunt malfunction. These include:  · Headaches.  · Nausea and vomiting.  · Fever.  · Swelling along the  shunt path.  · Vision changes.  · You cannot control your bladder.  · You have a seizure.  · You have trouble walking.     This information is not intended to replace advice given to you by your health care provider. Make sure you discuss any questions you have with your health care provider.     Document Released: 05/15/2012 Document Revised: 01/08/2016 Document Reviewed: 05/27/2015  [employer] Interactive Patient Education ©2016 [employer]    Incision Care  An incision is when a surgeon cuts into your body. After surgery, the incision needs to be cared for properly to prevent infection.   HOW TO CARE FOR YOUR INCISION  · Take medicines only as directed by your health care provider.  · There are many different ways to close and cover an incision, including stitches, skin glue, and adhesive strips. Follow your health care provider's instructions on:  ¨ Incision care.  ¨ Bandage  (dressing) changes and removal.  ¨ Incision closure removal.  · Do not take baths, swim, or use a hot tub until your health care provider approves. You may shower as directed by your health care provider.  · Resume your normal diet and activities as directed.  · Use anti-itch medicine (such as an antihistamine) as directed by your health care provider. The incision may itch while it is healing. Do not pick or scratch at the incision.  · Drink enough fluid to keep your urine clear or pale yellow.  SEEK MEDICAL CARE IF:   · You have drainage, redness, swelling, or pain at your incision site.  · You have muscle aches, chills, or a general ill feeling.  · You notice a bad smell coming from the incision or dressing.  · Your incision edges separate after the sutures, staples, or skin adhesive strips have been removed.  · You have persistent nausea or vomiting.  · You have a fever.  · You are dizzy.  SEEK IMMEDIATE MEDICAL CARE IF:   · You have a rash.  · You faint.  · You have difficulty breathing.  MAKE SURE YOU:   · Understand these instructions.  · Will watch your condition.  · Will get help right away if you are not doing well or get worse.     This information is not intended to replace advice given to you by your health care provider. Make sure you discuss any questions you have with your health care provider.     Document Released: 07/07/2006 Document Revised: 01/08/2016 Document Reviewed: 02/11/2015  [employer] Interactive Patient Education ©2016 [employer]        Depression / Suicide Risk    As you are discharged from this [facility], it is important to learn how to keep safe from harming yourself.    Recognize the warning signs:  · Abrupt changes in personality, positive or negative- including increase in energy   · Giving away possessions  · Change in eating patterns- significant weight changes-  positive or negative  · Change in sleeping patterns- unable to sleep or sleeping all the  time   · Unwillingness or inability to communicate  · Depression  · Unusual sadness, discouragement and loneliness  · Talk of wanting to die  · Neglect of personal appearance   · Rebelliousness- reckless behavior  · Withdrawal from people/activities they love  · Confusion- inability to concentrate     If you or a loved one observes any of these behaviors or has concerns about self-harm, here's what you can do:  · Talk about it- your feelings and reasons for harming yourself  · Remove any means that you might use to hurt yourself (examples: pills, rope, extension cords, firearm)  · Get professional help from the community (Mental Health, Substance Abuse, psychological counseling)  · Do not be alone:Call your Safe Contact- someone whom you trust who will be there for you.  · Call your local CRISIS HOTLINE 583-3588 or 365-589-7318  · Call your local Children's Mobile Crisis Response Team [place] (276) 517-3773 or www.PriceMDs.com  · Call the toll free National Suicide Prevention Hotlines   · National Suicide Prevention Lifeline 308-397-ECJC (2381)  · National Hope Line Network 800-SUICIDE (645-4132)

## 2017-06-03 NOTE — CARE PLAN
Problem: Pain Management  Goal: Pain level will decrease to patient’s comfort goal  Intervention: Follow pain managment plan developed in collaboration with patient and Interdisciplinary Team  Pt. Reports that her pain is acceptably controlled.       Problem: Mobility  Goal: Risk for activity intolerance will decrease  Intervention: Encourage patient to increase activity level in collaboration with Interdisciplinary Team  Pt. Ambulates independently with her spouse.

## 2017-06-04 ENCOUNTER — PATIENT OUTREACH (OUTPATIENT)
Dept: HEALTH INFORMATION MANAGEMENT | Facility: OTHER | Age: 47
End: 2017-06-04

## 2017-06-04 NOTE — PROGRESS NOTES
· Placed discharge outreach phone call to patient s/p hospital discharge 6/3/17.  Left voicemail with my contact information and instructions to return my phone call.

## 2017-06-05 NOTE — DISCHARGE SUMMARY
PRIMARY DIAGNOSES:  1.  Pseudotumor cerebri.  2.  Infected ventriculoperitoneal shunt, status post replacement.  3.  Migraine headaches.  4.  Anxiety.  5.  Status migrainous.  6.  Chronic diarrhea.  7.  Liver function test elevation, mild, possibly from methotrexate, which has   been stopped.  8.  Chronic whole body pain and history of opiate abuse.  9.  Rheumatoid arthritis without acute exacerbation.  10.  Acute delirium.     BRIEF HOSPITAL COURSE:  This is a 46-year-old female who presented to the   hospital with persistent visual changes, headaches, and confusion.  She was   found to have an infected  shunt, which was replaced by Dr. Berry.  She   tolerated the procedure well and her headaches had resolved.  Unfortunately,   we did note some increase in her liver function enzymes with AST of 94, ALT of   407, and alkaline phosphatase of 184.  Her methotrexate was stopped and she   needs to follow up with her rheumatologist for further treatment of her   rheumatoid arthritis, which appears to be somewhat quiescent at the moment.    She does not have evidence for active synovitis.  She will be discharged home   with follow up with her primary care provider, rheumatology and neurosurgery.    CONSULTATIONS:  1.  Dr. Bashir Ibarra of neurology.  2.  Dr. Drew Berry of neurosurgery.    PROCEDURES PERFORMED:  1.  Right frontal ventricular peritoneal shunt done by Dr. Berry on   05/31/2017.  2.  Lumbar puncture done on 05/27/2017.    LABORATORY STUDIES:  Culture results are negative.    MEDICATIONS ON DISCHARGE:  1.  Diamox 500 mg 2 times a day.  2.  Calcium carbonate 500 mg 2 times a day.  3.  Cymbalta 60 mg 2 times a day.  4.  Iron sulfate 324 mg.  5.  ____ mg a day.  6.  Neurontin 600 mg 3 times a day.  7.  Magnesium oxide 400 mg a day.  8.  Multivitamin 1 per day.  9.  Prilosec 20 mg a day.  10.  Roxicodone 10 mg every 4 hours as needed.  11.  Prednisone 10 mg a day.  12.  Risperdal 0.5 mg 2 times a day.  13.   Sulfasalazine 2 tabs 2 times a day 500 mg.  14.  Valerian root 500 mg at bedtime.  15.  Vitamin D3 1000 units a day.    TOTAL TIME SPENT:  Time spent on this patient and her discharge is 37 minutes.       ____________________________________     MD TORIN BARTLETT / EUGENE    DD:  06/04/2017 07:42:15  DT:  06/04/2017 19:26:36    D#:  [id number]  Job#:  [id number]    cc: SHUN MOODY MD, GURJIT Hollins DO, MD

## 2017-06-15 ENCOUNTER — OFFICE VISIT (OUTPATIENT)
Dept: RHEUMATOLOGY | Facility: PHYSICIAN GROUP | Age: 47
End: 2017-06-15
Payer: MEDICARE

## 2017-06-15 VITALS
DIASTOLIC BLOOD PRESSURE: 88 MMHG | SYSTOLIC BLOOD PRESSURE: 134 MMHG | TEMPERATURE: 98.8 F | WEIGHT: 162 LBS | HEART RATE: 112 BPM | BODY MASS INDEX: 28.7 KG/M2 | OXYGEN SATURATION: 94 % | RESPIRATION RATE: 12 BRPM

## 2017-06-15 DIAGNOSIS — M06.00 SERONEGATIVE RHEUMATOID ARTHRITIS (HCC): ICD-10-CM

## 2017-06-15 DIAGNOSIS — R79.89 ELEVATED LFTS: ICD-10-CM

## 2017-06-15 PROCEDURE — 99213 OFFICE O/P EST LOW 20 MIN: CPT | Performed by: INTERNAL MEDICINE

## 2017-06-15 ASSESSMENT — ENCOUNTER SYMPTOMS
ABDOMINAL PAIN: 0
HEMOPTYSIS: 0
CHILLS: 0
COUGH: 0

## 2017-06-15 NOTE — Clinical Note
[facility]   [address], [address]  [place] [address]  Phone: [phone]  Fax: [phone]              Encounter Date: 6/15/2017    Dear Dr. Power,    It was a pleasure seeing your patient, Enedelia Pang, on 6/15/2017. Diagnoses of Elevated LFTs and Seronegative rheumatoid arthritis (CMS-[facility]) were pertinent to this visit.     Please find attached progress note which includes the history I obtained from Ms. Pang, my physical examination findings, my impression and recommendations.      Once again, it was a pleasure participating in your patient's care.  Please feel free to contact me if you have any questions or if I can be of any further assistance to your patients.      Sincerely,    Aimee Andrade M.D.  Electronically Signed          PROGRESS NOTE:  Subjective:   Date of Consultation:6/15/2017  2:17 PM  Primary care physician:Elliott Power M.D.    Reason for Consultation:  Ms. Pang  is a pleasant 46 y.o. year old female who presented with follow-up seronegative Rheumatoid arthritis    Seronegative Rheumatoid Arthritis, non erosive  Since last visit she notes morning swelling lasting a few hours.  At discharge she was advised not to take methotrexate but did take it this AM.  She is on prednisone 10 mg po q day.    She had an EMG in January which she reports was normal.      Medications Compliance / Problems: compliant; she does note hair loss. She is not sure if it is related to methotrexate but is noting more hair loss.    Current Medications:  Sulfasalazine 1000 mg BID  Methotrexate 5 tabs weekly q Thursday  Folic acid  Prednisone 10 mg po q day    RHEUM HISTORY:  Ms. Enedelia Pang presented in January 2016 with joint pain and swelling of hands,knees, and weakness and a 3 month history of septal perforation.  ENT initial evaluation noted no disease activity.  She also has a history of abnormal LFT with unclear etiology.  She has had a work-up with GI in early  2017 which was unrevealing.  Also she has had a recurrent rash described as papules that ulcerate identified as MRSA infections.  Her labs did show thrombocytosis which could be inflammatory and anemia which was determined to be iron deficiency anemia.  Her rheumatologic work-up showed RF(-), MG (-), ANCA (-), SCL 70 (-) and anti centromere (-)  Parvovirus and rubella IgM was negative    Hand xrays did show periarticular osteopenia.    1/9/2017  Hepatitis B surface antigen negative  Hepatitis B core antibody negative  Hepatitis C antibody negative  quantiferon negative   HIV negative     CXR 12/6/2017 was negative for edema     11/2016 Nasal biopsy did not show active disease    Recent Hospitalization  Since her last visit she was admitted to the hospital for visual changes, headaches and confusion. She was found to have an infected  shunt which was replaced. Her headaches resolved. Methotrexate was stopped secondary to transaminitis. On exam there was no evidence of active synovitis. She was discharged on prednisone 10 mg daily. She was also on sulfasalazine 2 tabs 2 times a day 500 mg.     Epistaxis with nasal perforation  Stable  Since our last visit she stated she had an episode of epistaxis but did not get evaluated by ENT.  Since her recent hospitalization she has not had an episode of epistaxis.    Elevated liver enzymes  Notes from GI recommend close monitoring of LFT if methotrexate is used 1/17/2017 however now she has elevated LFT so we will need to stop methotrexate.  She states she did take it this morning.      Pseudotumor cerebri  Since hospitalization doing well and she feels the headaches are now down to a tolerable level.  She is being followed by Dr. Berry and Erika Reece    Iron deficiency anemia  MCV is elevated which could be secondary to her methotrexate  Her anemia has also resolved.    History of retinal disease  She did see ophthalmology     Past Medical History:  Pseudotumor cerebri,  "MRSA infection history, CVA, miraines, pituatary tumor, migraine headaches, septal perforation    Past Medical History   Diagnosis Date   • Migraine with aura, with intractable migraine, so stated, without mention of status migrainosus    • H/O Clostridium difficile infection    • Hydrocephalus      with lumbar shunt   • Hx MRSA infection    • Pituitary abnormality (CMS-HCC)    • Allergy, unspecified not elsewhere classified    • Anxiety    • Depression    • Stroke (CMS-HCC)      2007   • autoimmune      Past Surgical History   Procedure Laterality Date   • Cholecystectomy     • Tonsillectomy     • Appendectomy     • Tubal ligation     • Other       right knee surgery   • Other neurological surg       occipital nerve stimulator   • Irrigation & debridement neuro N/A 6/28/2015     Procedure: IRRIGATION & DEBRIDEMENT NEURO;  Surgeon: Oli Oh III, M.D.;  Location: SURGERY [facility];  Service:    • Lumbar exploration N/A 8/31/2015     Procedure: LUMBAR EXPLORATION- Lumbar shunt removal ;  Surgeon: Oli Oh III, M.D.;  Location: SURGERY [facility];  Service:    • Spinal cord stimulator  12/19/2016     Procedure: SPINAL CORD STIMULATOR FOR: PLACEMENT OF LEFT FLANK OCCIPITAL NERVE STIMULATOR BATTERY PACK;  Surgeon: Oli Oh III, M.D.;  Location: SURGERY [facility];  Service:    •  shunt insertion  5/31/2017     Procedure:  SHUNT INSERTION;  Surgeon: Drew Berry M.D.;  Location: SURGERY [facility];  Service:      Allergies   Allergen Reactions   • Prochlorperazine Swelling     Tongue swelling. Reaction as a teen. (compazine)  Tolerated Phenergan on 02/24/15   • Sumatriptan Unspecified     Historical=\"Heart stops.\" Reaction  between 1995 to 1997   • Vancomycin Shortness of Breath and Rash     Reaction in 2005.  D/W patient 8/31/15 - tolerated loading dose of vancomycin administered on 8/30/15 with some itching to chest with decreased infusion rate. Red Man Syndrome     "     Outpatient Encounter Prescriptions as of 6/15/2017   Medication Sig Dispense Refill   • magnesium oxide (MAG-OX) 400 MG Tab Take 400 mg by mouth every day.     • Valerian Root 500 MG Cap Take 500 mg by mouth every bedtime.     • predniSONE (DELTASONE) 5 MG Tab Take 2 Tabs by mouth every day. 60 Tab 1   • risperidone (RISPERDAL) 0.5 MG Tab Take 1 Tab by mouth 2 Times a Day. 60 Tab 3   • folic acid (FOLVITE) 1 MG Tab Take 1 Tab by mouth every day. 30 Tab 11   • sulfaSALAzine (AZULFIDINE) 500 MG Tab Take 2 Tabs by mouth 2 times a day. 120 Tab 3   • ferrous gluconate (FERGON) 324 (38 FE) MG Tab Take 1 Tab by mouth 2 times a day, with meals. 60 Tab 1   • calcium carbonate (CALCIUM CARBONATE) 500 MG Tab Take 1 Tab by mouth 2 times a day, with meals. 90 Tab 1   • omeprazole (PRILOSEC) 20 MG delayed-release capsule Take 1 Cap by mouth every day. 30 Cap 2   • vitamin D (VITAMIND D3) 1000 UNIT Tab Take 1 Tab by mouth every day. 60 Tab    • gabapentin (NEURONTIN) 600 MG tablet Take 600 mg by mouth 3 times a day.     • oxycodone immediate release (ROXICODONE) 10 MG immediate release tablet Take 1 Tab by mouth every four hours as needed for Moderate Pain. 100 Tab 0   • acetaZOLAMIDE SR (DIAMOX) 500 MG CAPSULE SR 12 HR Take 500 mg by mouth 2 times a day.     • Multiple Vitamins-Minerals (MULTIVITAMIN ADULT PO) Take 1 Tab by mouth every day.     • duloxetine (CYMBALTA) 60 MG CPEP delayed-release capsule Take 60 mg by mouth 2 times a day.       No facility-administered encounter medications on file as of 6/15/2017.       Social History     Social History   • Marital Status:      Spouse Name: N/A   • Number of Children: N/A   • Years of Education: N/A     Occupational History   • Not on file.     Social History Main Topics   • Smoking status: Never Smoker    • Smokeless tobacco: Never Used   • Alcohol Use: No   • Drug Use: No   • Sexual Activity: Not on file     Other Topics Concern   • Not on file     Social History  Narrative      History   Smoking status   • Never Smoker    Smokeless tobacco   • Never Used     History   Alcohol Use No     History   Drug Use No      OB History   No data available      Patient's last menstrual period was 05/09/2017.    [facility]     No family history on file.    Review of Systems   Constitutional: Negative for chills.   Respiratory: Negative for cough and hemoptysis.    Gastrointestinal: Negative for abdominal pain.   Musculoskeletal:        Hand swelling   Skin:                  Objective:   /88 mmHg  Pulse 112  Temp(Src) 37.1 °C (98.8 °F)  Resp 12  Wt 73.483 kg (162 lb)  SpO2 94%  LMP 05/09/2017  Breastfeeding? No    Physical Exam   Constitutional: She is oriented to person, place, and time. She appears well-developed and well-nourished. No distress.   HENT:   Head: Normocephalic and atraumatic.   Right Ear: External ear normal.   Left Ear: External ear normal.   Eyes: Conjunctivae are normal. Pupils are equal, round, and reactive to light. Right eye exhibits no discharge. Left eye exhibits no discharge. No scleral icterus.   Cardiovascular: Normal rate, regular rhythm and normal heart sounds.    Pulmonary/Chest: Effort normal. No stridor. No respiratory distress. She has no wheezes. She has no rales.   Musculoskeletal: She exhibits no edema.   Good ROM of her wrists bilateral.  Good ROM flexion and extension of elbows bilateral. No tenderness or periarticular swelling at the MCP.  Swelling and tenderness at the DIP.      Left hand swelling diffuse soft tissue swelling.  Able to make a .  Right hand no MCP swelling.   Neurological: She is alert and oriented to person, place, and time.   Skin: Skin is warm and dry. She is not diaphoretic.   No alopecia.  There is a lesion on the right temporal side of the head.   Psychiatric: She has a normal mood and affect. Her behavior is normal. Judgment and thought content normal.       Assessment:     1. Elevated LFTs     2. Seronegative  rheumatoid arthritis (CMS-HCC)       Labs:    Lab Results   Component Value Date/Time    QUANTIFERON TB GOLD Negative 01/09/2017 06:01 PM     Lab Results   Component Value Date/Time    HEPATITIS B CORS AB,IGM Negative 05/21/2017 02:07 AM    HEPATITIS B SURFACE ANTIGEN Negative 05/21/2017 02:07 AM     Lab Results   Component Value Date/Time    HEPATITIS C ANTIBODY Negative 05/21/2017 02:07 AM     Lab Results   Component Value Date/Time    SODIUM 137 06/03/2017 02:48 AM    POTASSIUM 3.6 06/03/2017 02:48 AM    CHLORIDE 109 06/03/2017 02:48 AM    CO2 21 06/03/2017 02:48 AM    GLUCOSE 89 06/03/2017 02:48 AM    BUN 13 06/03/2017 02:48 AM    CREATININE 0.52 06/03/2017 02:48 AM      Lab Results   Component Value Date/Time    WBC 8.8 06/03/2017 02:48 AM    RBC 3.81* 06/03/2017 02:48 AM    HEMOGLOBIN 12.5 06/03/2017 02:48 AM    HEMATOCRIT 38.5 06/03/2017 02:48 AM    .0* 06/03/2017 02:48 AM    MCH 32.8 06/03/2017 02:48 AM    MCHC 32.5* 06/03/2017 02:48 AM    MPV 9.5 06/03/2017 02:48 AM    NEUTROPHILS-POLYS 66.40 06/03/2017 02:48 AM    LYMPHOCYTES 24.30 06/03/2017 02:48 AM    MONOCYTES 7.50 06/03/2017 02:48 AM    EOSINOPHILS 0.20 06/03/2017 02:48 AM    BASOPHILS 0.80 06/03/2017 02:48 AM    HYPOCHROMIA 1+ 01/11/2017 02:29 PM    ANISOCYTOSIS 1+ 01/11/2017 02:29 PM      Lab Results   Component Value Date/Time    CALCIUM 9.2 06/03/2017 02:48 AM    AST(SGOT) 75* 06/03/2017 02:48 AM    ALT(SGPT) 345* 06/03/2017 02:48 AM    ALKALINE PHOSPHATASE 195* 06/03/2017 02:48 AM    TOTAL BILIRUBIN 0.3 06/03/2017 02:48 AM    ALBUMIN 3.8 06/03/2017 02:48 AM    TOTAL PROTEIN 6.3 06/03/2017 02:48 AM     Lab Results   Component Value Date/Time    RHEUMATOID FACTOR -NEPH- <10 04/17/2017 12:20 PM    ANTINUCLEAR ANTIBODY None Detected 01/06/2017 04:12 AM     Lab Results   Component Value Date/Time    SED RATE WESTERGREN 19 05/21/2017 02:07 AM    STAT C-REACTIVE PROTEIN 8.74* 05/21/2017 02:07 AM     No results found for: EMMANUEL,  DRVVTINTERP  Lab Results   Component Value Date/Time    C3 COMPLEMENT 178.0 01/06/2017 04:12 AM    COMPLEMENT C4 37.0 01/06/2017 04:12 AM    CRYOGLOBULINS NEG 72Hour 04/17/2017 03:53 PM     Lab Results   Component Value Date/Time    ANTI-SCL-70 0 01/06/2017 04:12 AM    ANTI-CENTROMERE B AB 2 01/06/2017 04:12 AM     Lab Results   Component Value Date/Time    ANCA IGG <1:20 01/06/2017 04:12 AM    C3 COMPLEMENT 178.0 01/06/2017 04:12 AM     Lab Results   Component Value Date/Time    COLOR Lt. Yellow 05/20/2017 09:37 PM    SPECIFIC GRAVITY 1.012 05/31/2017 07:19 AM    PH 8.0 05/20/2017 09:37 PM    GLUCOSE Negative 05/20/2017 09:37 PM    KETONES Negative 05/20/2017 09:37 PM    PROTEIN Negative 05/20/2017 09:37 PM     No results found for: TOTPROTUR, TOTALVOLUME, [id number]  No results found for: SSA60, SSA52  Lab Results   Component Value Date/Time    GLYCOHEMOGLOBIN 5.2 04/29/2017 02:09 PM     Lab Results   Component Value Date/Time    CPK TOTAL 40 05/21/2017 02:07 AM     Lab Results   Component Value Date/Time    G-6-PD 20.4* 01/10/2017 01:54 PM     No results found for: [id number]  No results found for: ACESERUM  Lab Results   Component Value Date/Time    25-HYDROXY   VITAMIN D 25 45 01/09/2017 06:00 PM     No results found for: TSH, FREEDIR  Lab Results   Component Value Date/Time    TSH 6.860* 05/21/2017 02:07 AM    FREE T-4 0.76 01/05/2017 11:22 PM     No results found for: MICROSOMALA, ANTITHYROGL  No results found for: IGGLYMEABS  No results found for: ANTIMITOCHO, FACTIN  No results found for: IGA, TTRANSIGA, ENDOIGA  No results found for: FLTYPE, CRYSTALSBDF  No results found for: ISTATICAL  No results found for: ISTATCREAT  No results found for: CTELOPEP  No results found for: GBMABG  No results found for: PTHINTACT  1/27/2015 hepatitis E IgM    1/27/2015 ceruloplasmin = 29.7  1/27/2015 smith antibody = 9 (normal)  1/27/2015 MG negative  1/27/2015 alpha antitrypsin 119 ()  1/27/2015 ferritin = 75  (normal)  1/27/2015 GGT = 327 (0-55)    1/27/2015 IgG 4 = 11  10/31/2016 and 1/27/2015  p ANCA (-), c ANCA (-), PR3 (-)  10/31/2016 MPO (-)  12/7/2014 AST = 23 ALT = 137 Alk phos = 205  6/10/2016 ALT = 921 AST = 821 Alk phos = 537  Labs from JUne 2016 shows low albumin 2.7 and AST 92 and ALT of 370    6/11/2016; AST=92, ALT  = 370 Alk Phos = 331 Hgb = 8.6 MCV = 74.7 t bili = 1.2  10/15/2015: Hgb = 9.1 Plt =   ALT= 357 AST =127 Alk phos = 469 creatinine = 0.7    US LE venous doppler bilateral 6/30/2014  Probable baker's cyst on left lower extremity    Left hand XR 8/14/2014  Soft tissue swelling over the dorsum.  No erorsons no fractures     CT abdomen/Pelvis w/o Contrast 9/4/2014  Small amount of fluid in cul-de-sac no abnormalities in liver.  Abdominal aorta was normal.  Adrenal glands normal.  coritical medullatry calcifications noted of left kidney    CT head w w/o contrast 10/15/2016  No acute abnormalities and on CT maxillofacial w/ contrast - no evidence of sinusitis    CTchest w/con 1/12/2016  No findings to suggest aortic dissection or pulmonary embolus  Left renal 18 mm cyst in the ventral cortex medially    CT spine lumbar w/o contrast  8/30/2015  Small fluid  Collection posterior junction of the epidural catheter seen right lateral to the L2 posterior spinous process consistent with leakage and/or infection      Medical Decision Making:  Today's Assessment / Status / Plan:     Seronegative rheumatoid arthritis with involvement of the small joints of the hands and wrists and periarticular osteopenia on xray  Due to elevated LFT I have asked her to stop her methotrexate and sulfasalazine.  Patient voiced understanding.  Today we will continue prednisone 10 mg po q day.      Septal perforation  ANCA serologies are negative x 3  MG Is negative  Biopsy results did not show active disease  CXR did not note hilar adenopathy  Again I have advised her to follow-up with ENT    Abnormal LFT  Elevated.  I have asked  her to make an appointment with GI.  We are holding her sulfasalazine and methotrexate     1. Elevated LFTs     2. Seronegative rheumatoid arthritis (CMS-HCC)           Return in about 4 weeks (around 7/13/2017).      She agreed and verbalized her agreement and understanding with the current plan. I answered all questions and concerns she has at this time and advised her to call at any time in there interim with questions or concerns in regards to her care.    Thank you for allowing me to participate in her care, I will continue to follow closely.

## 2017-06-15 NOTE — PROGRESS NOTES
Subjective:   Date of Consultation:6/15/2017  2:17 PM  Primary care physician:Elliott Power M.D.    Reason for Consultation:  Ms. Pang  is a pleasant 46 y.o. year old female who presented with follow-up seronegative Rheumatoid arthritis    Seronegative Rheumatoid Arthritis, non erosive  Since last visit she notes morning swelling lasting a few hours.  At discharge she was advised not to take methotrexate but did take it this AM.  She is on prednisone 10 mg po q day.    She had an EMG in January which she reports was normal.      Medications Compliance / Problems: compliant; she does note hair loss. She is not sure if it is related to methotrexate but is noting more hair loss.    Current Medications:  Sulfasalazine 1000 mg BID  Methotrexate 5 tabs weekly q Thursday  Folic acid  Prednisone 10 mg po q day    RHEUM HISTORY:  Ms. Enedelia Pang presented in January 2016 with joint pain and swelling of hands,knees, and weakness and a 3 month history of septal perforation.  ENT initial evaluation noted no disease activity.  She also has a history of abnormal LFT with unclear etiology.  She has had a work-up with GI in early 2017 which was unrevealing.  Also she has had a recurrent rash described as papules that ulcerate identified as MRSA infections.  Her labs did show thrombocytosis which could be inflammatory and anemia which was determined to be iron deficiency anemia.  Her rheumatologic work-up showed RF(-), MG (-), ANCA (-), SCL 70 (-) and anti centromere (-)  Parvovirus and rubella IgM was negative    Hand xrays did show periarticular osteopenia.    1/9/2017  Hepatitis B surface antigen negative  Hepatitis B core antibody negative  Hepatitis C antibody negative  quantiferon negative   HIV negative     CXR 12/6/2017 was negative for edema     11/2016 Nasal biopsy did not show active disease    Recent Hospitalization  Since her last visit she was admitted to the hospital for visual changes, headaches and  confusion. She was found to have an infected  shunt which was replaced. Her headaches resolved. Methotrexate was stopped secondary to transaminitis. On exam there was no evidence of active synovitis. She was discharged on prednisone 10 mg daily. She was also on sulfasalazine 2 tabs 2 times a day 500 mg.     Epistaxis with nasal perforation  Stable  Since our last visit she stated she had an episode of epistaxis but did not get evaluated by ENT.  Since her recent hospitalization she has not had an episode of epistaxis.    Elevated liver enzymes  Notes from GI recommend close monitoring of LFT if methotrexate is used 1/17/2017 however now she has elevated LFT so we will need to stop methotrexate.  She states she did take it this morning.      Pseudotumor cerebri  Since hospitalization doing well and she feels the headaches are now down to a tolerable level.  She is being followed by Dr. Berry and Erika Reece    Iron deficiency anemia  MCV is elevated which could be secondary to her methotrexate  Her anemia has also resolved.    History of retinal disease  She did see ophthalmology     Past Medical History:  Pseudotumor cerebri, MRSA infection history, CVA, miraines, pituatary tumor, migraine headaches, septal perforation    Past Medical History   Diagnosis Date   • Migraine with aura, with intractable migraine, so stated, without mention of status migrainosus    • H/O Clostridium difficile infection    • Hydrocephalus      with lumbar shunt   • Hx MRSA infection    • Pituitary abnormality (CMS-HCC)    • Allergy, unspecified not elsewhere classified    • Anxiety    • Depression    • Stroke (CMS-HCC)      2007   • autoimmune      Past Surgical History   Procedure Laterality Date   • Cholecystectomy     • Tonsillectomy     • Appendectomy     • Tubal ligation     • Other       right knee surgery   • Other neurological surg       occipital nerve stimulator   • Irrigation & debridement neuro N/A 6/28/2015     Procedure:  "IRRIGATION & DEBRIDEMENT NEURO;  Surgeon: Oli Oh III, M.D.;  Location: [facility];  Service:    • Lumbar exploration N/A 8/31/2015     Procedure: LUMBAR EXPLORATION- Lumbar shunt removal ;  Surgeon: Oli Oh III, M.D.;  Location: [facility];  Service:    • Spinal cord stimulator  12/19/2016     Procedure: SPINAL CORD STIMULATOR FOR: PLACEMENT OF LEFT FLANK OCCIPITAL NERVE STIMULATOR BATTERY PACK;  Surgeon: Oli Oh III, M.D.;  Location: [facility];  Service:    •  shunt insertion  5/31/2017     Procedure:  SHUNT INSERTION;  Surgeon: Drew Berry M.D.;  Location: [facility];  Service:      Allergies   Allergen Reactions   • Prochlorperazine Swelling     Tongue swelling. Reaction as a teen. (compazine)  Tolerated Phenergan on 02/24/15   • Sumatriptan Unspecified     Historical=\"Heart stops.\" Reaction  between 1995 to 1997   • Vancomycin Shortness of Breath and Rash     Reaction in 2005.  D/W patient 8/31/15 - tolerated loading dose of vancomycin administered on 8/30/15 with some itching to chest with decreased infusion rate. Red Man Syndrome     Outpatient Encounter Prescriptions as of 6/15/2017   Medication Sig Dispense Refill   • magnesium oxide (MAG-OX) 400 MG Tab Take 400 mg by mouth every day.     • Valerian Root 500 MG Cap Take 500 mg by mouth every bedtime.     • predniSONE (DELTASONE) 5 MG Tab Take 2 Tabs by mouth every day. 60 Tab 1   • risperidone (RISPERDAL) 0.5 MG Tab Take 1 Tab by mouth 2 Times a Day. 60 Tab 3   • folic acid (FOLVITE) 1 MG Tab Take 1 Tab by mouth every day. 30 Tab 11   • sulfaSALAzine (AZULFIDINE) 500 MG Tab Take 2 Tabs by mouth 2 times a day. 120 Tab 3   • ferrous gluconate (FERGON) 324 (38 FE) MG Tab Take 1 Tab by mouth 2 times a day, with meals. 60 Tab 1   • calcium carbonate (CALCIUM CARBONATE) 500 MG Tab Take 1 Tab by mouth 2 times a day, with meals. 90 Tab 1   • omeprazole (PRILOSEC) 20 MG delayed-release " capsule Take 1 Cap by mouth every day. 30 Cap 2   • vitamin D (VITAMIND D3) 1000 UNIT Tab Take 1 Tab by mouth every day. 60 Tab    • gabapentin (NEURONTIN) 600 MG tablet Take 600 mg by mouth 3 times a day.     • oxycodone immediate release (ROXICODONE) 10 MG immediate release tablet Take 1 Tab by mouth every four hours as needed for Moderate Pain. 100 Tab 0   • acetaZOLAMIDE SR (DIAMOX) 500 MG CAPSULE SR 12 HR Take 500 mg by mouth 2 times a day.     • Multiple Vitamins-Minerals (MULTIVITAMIN ADULT PO) Take 1 Tab by mouth every day.     • duloxetine (CYMBALTA) 60 MG CPEP delayed-release capsule Take 60 mg by mouth 2 times a day.       No facility-administered encounter medications on file as of 6/15/2017.       Social History     Social History   • Marital Status:      Spouse Name: N/A   • Number of Children: N/A   • Years of Education: N/A     Occupational History   • Not on file.     Social History Main Topics   • Smoking status: Never Smoker    • Smokeless tobacco: Never Used   • Alcohol Use: No   • Drug Use: No   • Sexual Activity: Not on file     Other Topics Concern   • Not on file     Social History Narrative      History   Smoking status   • Never Smoker    Smokeless tobacco   • Never Used     History   Alcohol Use No     History   Drug Use No      OB History   No data available      Patient's last menstrual period was 05/09/2017.    [facility]     No family history on file.    Review of Systems   Constitutional: Negative for chills.   Respiratory: Negative for cough and hemoptysis.    Gastrointestinal: Negative for abdominal pain.   Musculoskeletal:        Hand swelling   Skin:                  Objective:   /88 mmHg  Pulse 112  Temp(Src) 37.1 °C (98.8 °F)  Resp 12  Wt 73.483 kg (162 lb)  SpO2 94%  LMP 05/09/2017  Breastfeeding? No    Physical Exam   Constitutional: She is oriented to person, place, and time. She appears well-developed and well-nourished. No distress.   HENT:   Head:  Normocephalic and atraumatic.   Right Ear: External ear normal.   Left Ear: External ear normal.   Eyes: Conjunctivae are normal. Pupils are equal, round, and reactive to light. Right eye exhibits no discharge. Left eye exhibits no discharge. No scleral icterus.   Cardiovascular: Normal rate, regular rhythm and normal heart sounds.    Pulmonary/Chest: Effort normal. No stridor. No respiratory distress. She has no wheezes. She has no rales.   Musculoskeletal: She exhibits no edema.   Good ROM of her wrists bilateral.  Good ROM flexion and extension of elbows bilateral. No tenderness or periarticular swelling at the MCP.  Swelling and tenderness at the DIP.      Left hand swelling diffuse soft tissue swelling.  Able to make a .  Right hand no MCP swelling.   Neurological: She is alert and oriented to person, place, and time.   Skin: Skin is warm and dry. She is not diaphoretic.   No alopecia.  There is a lesion on the right temporal side of the head.   Psychiatric: She has a normal mood and affect. Her behavior is normal. Judgment and thought content normal.       Assessment:     1. Elevated LFTs     2. Seronegative rheumatoid arthritis (CMS-HCC)       Labs:    Lab Results   Component Value Date/Time    QUANTIFERON TB GOLD Negative 01/09/2017 06:01 PM     Lab Results   Component Value Date/Time    HEPATITIS B CORS AB,IGM Negative 05/21/2017 02:07 AM    HEPATITIS B SURFACE ANTIGEN Negative 05/21/2017 02:07 AM     Lab Results   Component Value Date/Time    HEPATITIS C ANTIBODY Negative 05/21/2017 02:07 AM     Lab Results   Component Value Date/Time    SODIUM 137 06/03/2017 02:48 AM    POTASSIUM 3.6 06/03/2017 02:48 AM    CHLORIDE 109 06/03/2017 02:48 AM    CO2 21 06/03/2017 02:48 AM    GLUCOSE 89 06/03/2017 02:48 AM    BUN 13 06/03/2017 02:48 AM    CREATININE 0.52 06/03/2017 02:48 AM      Lab Results   Component Value Date/Time    WBC 8.8 06/03/2017 02:48 AM    RBC 3.81* 06/03/2017 02:48 AM    HEMOGLOBIN 12.5  06/03/2017 02:48 AM    HEMATOCRIT 38.5 06/03/2017 02:48 AM    .0* 06/03/2017 02:48 AM    MCH 32.8 06/03/2017 02:48 AM    MCHC 32.5* 06/03/2017 02:48 AM    MPV 9.5 06/03/2017 02:48 AM    NEUTROPHILS-POLYS 66.40 06/03/2017 02:48 AM    LYMPHOCYTES 24.30 06/03/2017 02:48 AM    MONOCYTES 7.50 06/03/2017 02:48 AM    EOSINOPHILS 0.20 06/03/2017 02:48 AM    BASOPHILS 0.80 06/03/2017 02:48 AM    HYPOCHROMIA 1+ 01/11/2017 02:29 PM    ANISOCYTOSIS 1+ 01/11/2017 02:29 PM      Lab Results   Component Value Date/Time    CALCIUM 9.2 06/03/2017 02:48 AM    AST(SGOT) 75* 06/03/2017 02:48 AM    ALT(SGPT) 345* 06/03/2017 02:48 AM    ALKALINE PHOSPHATASE 195* 06/03/2017 02:48 AM    TOTAL BILIRUBIN 0.3 06/03/2017 02:48 AM    ALBUMIN 3.8 06/03/2017 02:48 AM    TOTAL PROTEIN 6.3 06/03/2017 02:48 AM     Lab Results   Component Value Date/Time    RHEUMATOID FACTOR -NEPH- <10 04/17/2017 12:20 PM    ANTINUCLEAR ANTIBODY None Detected 01/06/2017 04:12 AM     Lab Results   Component Value Date/Time    SED RATE WESTERGREN 19 05/21/2017 02:07 AM    STAT C-REACTIVE PROTEIN 8.74* 05/21/2017 02:07 AM     No results found for: LETA BENITEZ  Lab Results   Component Value Date/Time    C3 COMPLEMENT 178.0 01/06/2017 04:12 AM    COMPLEMENT C4 37.0 01/06/2017 04:12 AM    CRYOGLOBULINS NEG 72Hour 04/17/2017 03:53 PM     Lab Results   Component Value Date/Time    ANTI-SCL-70 0 01/06/2017 04:12 AM    ANTI-CENTROMERE B AB 2 01/06/2017 04:12 AM     Lab Results   Component Value Date/Time    ANCA IGG <1:20 01/06/2017 04:12 AM    C3 COMPLEMENT 178.0 01/06/2017 04:12 AM     Lab Results   Component Value Date/Time    COLOR Lt. Yellow 05/20/2017 09:37 PM    SPECIFIC GRAVITY 1.012 05/31/2017 07:19 AM    PH 8.0 05/20/2017 09:37 PM    GLUCOSE Negative 05/20/2017 09:37 PM    KETONES Negative 05/20/2017 09:37 PM    PROTEIN Negative 05/20/2017 09:37 PM     No results found for: TOTPROTUR, TOTALVOLUME, OIGSNTQT28  No results found for: SSA60, SSA52  Lab  Results   Component Value Date/Time    GLYCOHEMOGLOBIN 5.2 04/29/2017 02:09 PM     Lab Results   Component Value Date/Time    CPK TOTAL 40 05/21/2017 02:07 AM     Lab Results   Component Value Date/Time    G-6-PD 20.4* 01/10/2017 01:54 PM     No results found for: AOQU87TVTB  No results found for: ACESERUM  Lab Results   Component Value Date/Time    25-HYDROXY   VITAMIN D 25 45 01/09/2017 06:00 PM     No results found for: TSH, FREEDIR  Lab Results   Component Value Date/Time    TSH 6.860* 05/21/2017 02:07 AM    FREE T-4 0.76 01/05/2017 11:22 PM     No results found for: MICROSOMALA, ANTITHYROGL  No results found for: IGGLYMEABS  No results found for: ANTIMITOCHO, FACTIN  No results found for: IGA, TTRANSIGA, ENDOIGA  No results found for: FLTYPE, CRYSTALSBDF  No results found for: ISTATICAL  No results found for: ISTATCREAT  No results found for: CTELOPEP  No results found for: GBMABG  No results found for: PTHINTACT  1/27/2015 hepatitis E IgM    1/27/2015 ceruloplasmin = 29.7  1/27/2015 smith antibody = 9 (normal)  1/27/2015 MG negative  1/27/2015 alpha antitrypsin 119 ()  1/27/2015 ferritin = 75 (normal)  1/27/2015 GGT = 327 (0-55)    1/27/2015 IgG 4 = 11  10/31/2016 and 1/27/2015  p ANCA (-), c ANCA (-), PR3 (-)  10/31/2016 MPO (-)  12/7/2014 AST = 23 ALT = 137 Alk phos = 205  6/10/2016 ALT = 921 AST = 821 Alk phos = 537  Labs from JUne 2016 shows low albumin 2.7 and AST 92 and ALT of 370    6/11/2016; AST=92, ALT  = 370 Alk Phos = 331 Hgb = 8.6 MCV = 74.7 t bili = 1.2  10/15/2015: Hgb = 9.1 Plt =   ALT= 357 AST =127 Alk phos = 469 creatinine = 0.7    US LE venous doppler bilateral 6/30/2014  Probable baker's cyst on left lower extremity    Left hand XR 8/14/2014  Soft tissue swelling over the dorsum.  No erorsons no fractures     CT abdomen/Pelvis w/o Contrast 9/4/2014  Small amount of fluid in cul-de-sac no abnormalities in liver.  Abdominal aorta was normal.  Adrenal glands normal.  coritical  medullatry calcifications noted of left kidney    CT head w w/o contrast 10/15/2016  No acute abnormalities and on CT maxillofacial w/ contrast - no evidence of sinusitis    CTchest w/con 1/12/2016  No findings to suggest aortic dissection or pulmonary embolus  Left renal 18 mm cyst in the ventral cortex medially    CT spine lumbar w/o contrast  8/30/2015  Small fluid  Collection posterior junction of the epidural catheter seen right lateral to the L2 posterior spinous process consistent with leakage and/or infection      Medical Decision Making:  Today's Assessment / Status / Plan:     Seronegative rheumatoid arthritis with involvement of the small joints of the hands and wrists and periarticular osteopenia on xray  Due to elevated LFT I have asked her to stop her methotrexate and sulfasalazine.  Patient voiced understanding.  Today we will continue prednisone 10 mg po q day.      Septal perforation  ANCA serologies are negative x 3  MG Is negative  Biopsy results did not show active disease  CXR did not note hilar adenopathy  Again I have advised her to follow-up with ENT    Abnormal LFT  Elevated.  I have asked her to make an appointment with GI.  We are holding her sulfasalazine and methotrexate     1. Elevated LFTs     2. Seronegative rheumatoid arthritis (CMS-HCC)           Return in about 4 weeks (around 7/13/2017).      She agreed and verbalized her agreement and understanding with the current plan. I answered all questions and concerns she has at this time and advised her to call at any time in there interim with questions or concerns in regards to her care.    Thank you for allowing me to participate in her care, I will continue to follow closely.

## 2017-06-15 NOTE — MR AVS SNAPSHOT
"        Enedelia Pang   6/15/2017 2:00 PM   Office Visit   MRN: [id number]    Department:  Rheumatology [facility]   Dept Phone:  [phone]    Description:  Female : 1970   Provider:  Aimee Andrade M.D.           Reason for Visit     Follow-Up follow up      Allergies as of 6/15/2017     Allergen Noted Reactions    Prochlorperazine 2015   Swelling    Tongue swelling. Reaction as a teen. (compazine)  Tolerated Phenergan on 02/24/15    Sumatriptan 2015   Unspecified    Historical=\"Heart stops.\" Reaction  between  to     Vancomycin 2015   Shortness of Breath, Rash    Reaction in .  D/W patient 8/31/15 - tolerated loading dose of vancomycin administered on 8/30/15 with some itching to chest with decreased infusion rate. Red Man Syndrome      Vital Signs     Blood Pressure Pulse Temperature Respirations Weight Oxygen Saturation    134/88 mmHg 112 37.1 °C (98.8 °F) 12 73.483 kg (162 lb) 94%    Last Menstrual Period Breastfeeding? Smoking Status             2017 No Never Smoker          Basic Information     Date Of Birth Sex Race Ethnicity Preferred Language    1970 Female White Non- English      Your appointments     2017 10:20 AM   New Patient with Selina Reece PA-C   [facility] (--)    [address], [address]  [facility] [id number] [phone]           Please bring Photo ID, Insurance Cards, All Medication Bottles and copies of any legal documents (such as Living Will, Power of ) If speaking a language besides English please bring an adult . Please arrive 30 minutes prior for check in and registration. You will be receiving a confirmation call a few days before your appointment from our automated call confirmation system.            2017  2:30 PM   Follow Up Visit with Aimee Andrade M.D.   [facility] (--)    [address], [address]  [facility] [id number] [phone]           You will be " receiving a confirmation call a few days before your appointment from our automated call confirmation system.              Problem List              ICD-10-CM Priority Class Noted - Resolved    Paresthesia of right arm R20.2   10/9/2014 - Present    Intestinal infection due to Clostridium difficile A04.7 Medium  10/10/2014 - Present    R Hemiparesis G81.90 High  10/29/2014 - Present    Complicated migraine (Chronic) G43.109 Medium  10/29/2014 - Present    Osteomyelitis of finger of left hand (CMS-HCC) M86.9 Medium  10/30/2014 - Present    Elevated ALT R74.0   11/4/2014 - Present    Intractable migraine G43.919 Medium  3/21/2015 - Present    Low blood pressure reading R03.1   3/22/2015 - Present    Dermatitis- chronic L30.9   3/24/2015 - Present    Wounds, multiple T07   4/23/2015 - Present    Hydrocephalus G91.9   4/23/2015 - Present    Back pain M54.9 High  6/28/2015 - Present    SIRS (systemic inflammatory response syndrome) (CMS-[facility]) R65.10   6/28/2015 - Present    S/P  shunt (Chronic) Z98.2   6/29/2015 - Present    Anxiety F41.9 Medium  6/29/2015 - Present    Depression (Chronic) F32.9   6/29/2015 - Present    Headache R51   7/3/2015 - Present    Abdominal pain R10.9   7/3/2015 - Present    Hypokalemia E87.6   8/30/2015 - Present    Lactic acidosis E87.2   8/30/2015 - Present    Hypomagnesemia E83.42   8/30/2015 - Present    Leukocytosis D72.829   8/30/2015 - Present    Sepsis (CMS-[facility]) A41.9   8/30/2015 - Present    Intractable low back pain M54.5   8/30/2015 - Present    Hypocalcemia E83.51   8/30/2015 - Present    Septic shock (CMS-[facility]) A41.9, R65.21 High  8/31/2015 - Present    Normocytic anemia D64.9   8/31/2015 - Present    Thrombocytopenia (CMS-[facility]) D69.6   8/31/2015 - Present    Infected venous access port T80.219A   10/3/2015 - Present    Migraine G43.909   10/4/2015 - Present    Chronic pain (Chronic) G89.29   10/4/2015 - Present    Bacteremia R78.81   10/4/2015 - Present    Chronic pain syndrome  G89.4 Low  12/15/2016 - Present    Chronic pain disorder (Chronic) G89.4   12/19/2016 - Present    Swelling of joint of hand M25.449 High  1/6/2017 - Present    Seronegative rheumatoid arthritis (CMS-HCC) M06.00   1/17/2017 - Present    Chest pain R07.9 High  4/29/2017 - Present    H/O: CVA (cerebrovascular accident) (Chronic) Z86.73   4/30/2017 - Present    Suicidal ideation R45.851 High  4/30/2017 - Present    Hyperlipidemia E78.5   5/1/2017 - Present    Metabolic acidosis E87.2   5/1/2017 - Present    Rheumatoid arthritis (CMS-HCC) M06.9   5/1/2017 - Present    Diarrhea R19.7 Medium  5/21/2017 - Present    Status migrainosus G43.901 High  5/21/2017 - Present    LFT elevation R94.5 Medium  5/21/2017 - Present    Whole body pain R52 Medium  5/22/2017 - Present    Rheumatoid arthritis (CMS-HCC) (Chronic) M06.9   5/22/2017 - Present    Nasal congestion R09.81 Medium  5/22/2017 - Present    Nausea and vomiting R11.2   5/26/2017 - Present    Positive blood cultures R78.81   5/26/2017 - Present      Health Maintenance        Date Due Completion Dates    IMM DTaP/Tdap/Td Vaccine (1 - Tdap) 8/16/1991 ---    PAP SMEAR 8/16/1993 ---    MAMMOGRAM 8/16/2012 ---            Current Immunizations     Influenza Vaccine Quad Inj (Pf) 1/11/2017  8:57 AM, 10/9/2014  9:45 AM    Influenza Vaccine Quad Inj (Preserved) 10/3/2015  4:06 PM      Below and/or attached are the medications your provider expects you to take. Review all of your home medications and newly ordered medications with your provider and/or pharmacist. Follow medication instructions as directed by your provider and/or pharmacist. Please keep your medication list with you and share with your provider. Update the information when medications are discontinued, doses are changed, or new medications (including over-the-counter products) are added; and carry medication information at all times in the event of emergency situations     Allergies:  PROCHLORPERAZINE - Swelling      SUMATRIPTAN - Unspecified     VANCOMYCIN - Shortness of Breath,Rash               Medications  Valid as of: Alexandra 15, 2017 -  2:57 PM    Generic Name Brand Name Tablet Size Instructions for use    AcetaZOLAMIDE (CAPSULE SR 12 HR) DIAMOX 500 MG Take 500 mg by mouth 2 times a day.        Cholecalciferol (Tab) VITAMIND D3 1000 UNIT Take 1 Tab by mouth every day.        DULoxetine HCl (Cap DR Particles) CYMBALTA 60 MG Take 60 mg by mouth 2 times a day.        Ferrous Gluconate (Tab) FERGON 324 (38 FE) MG Take 1 Tab by mouth 2 times a day, with meals.        Folic Acid (Tab) FOLVITE 1 MG Take 1 Tab by mouth every day.        Gabapentin (Tab) NEURONTIN 600 MG Take 600 mg by mouth 3 times a day.        Magnesium Oxide (Tab) MAG- MG Take 400 mg by mouth every day.        Multiple Vitamins-Minerals   Take 1 Tab by mouth every day.        Omeprazole (CAPSULE DELAYED RELEASE) PRILOSEC 20 MG Take 1 Cap by mouth every day.        OxyCODONE HCl (Tab) ROXICODONE 10 MG Take 1 Tab by mouth every four hours as needed for Moderate Pain.        Oyster Shell (Tab) OS-ZAIN 500 500 MG Take 1 Tab by mouth 2 times a day, with meals.        PredniSONE (Tab) DELTASONE 5 MG Take 2 Tabs by mouth every day.        RisperiDONE (Tab) RISPERDAL 0.5 MG Take 1 Tab by mouth 2 Times a Day.        SulfaSALAzine (Tab) AZULFIDINE 500 MG Take 2 Tabs by mouth 2 times a day.        Valerian (Cap) Valerian Root 500 MG Take 500 mg by mouth every bedtime.        .                 Medicines prescribed today were sent to:     [facility] - [address] [place] [address]    Phone: [phone] Fax: [phone]    Open 24 Hours?: No      Medication refill instructions:       If your prescription bottle indicates you have medication refills left, it is not necessary to call your provider’s office. Please contact your pharmacy and they will refill your medication.    If your prescription bottle indicates you do not have any  refills left, you may request refills at any time through one of the following ways: The online [facility] system (except Urgent Care), by calling your provider’s office, or by asking your pharmacy to contact your provider’s office with a refill request. Medication refills are processed only during regular business hours and may not be available until the next business day. Your provider may request additional information or to have a follow-up visit with you prior to refilling your medication.   *Please Note: Medication refills are assigned a new Rx number when refilled electronically. Your pharmacy may indicate that no refills were authorized even though a new prescription for the same medication is available at the pharmacy. Please request the medicine by name with the pharmacy before contacting your provider for a refill.           [facility] Access Code: Activation code not generated  Current [facility] Status: Active

## 2017-07-05 ENCOUNTER — HOSPITAL ENCOUNTER (OUTPATIENT)
Dept: RADIOLOGY | Facility: MEDICAL CENTER | Age: 47
End: 2017-07-05
Attending: INTERNAL MEDICINE
Payer: MEDICARE

## 2017-07-05 DIAGNOSIS — R74.01 HIGH TRANSAMINASE LEVELS: ICD-10-CM

## 2017-07-05 PROCEDURE — 76700 US EXAM ABDOM COMPLETE: CPT

## 2017-07-11 ENCOUNTER — HOSPITAL ENCOUNTER (OUTPATIENT)
Dept: LAB | Facility: MEDICAL CENTER | Age: 47
End: 2017-07-11
Attending: INTERNAL MEDICINE
Payer: MEDICARE

## 2017-07-11 LAB
FOLATE SERPL-MCNC: >23.7 NG/ML
VIT B12 SERPL-MCNC: 406 PG/ML (ref 211–911)

## 2017-07-11 PROCEDURE — 82607 VITAMIN B-12: CPT

## 2017-07-11 PROCEDURE — 82746 ASSAY OF FOLIC ACID SERUM: CPT

## 2017-07-11 PROCEDURE — 36415 COLL VENOUS BLD VENIPUNCTURE: CPT

## 2017-07-13 ENCOUNTER — OFFICE VISIT (OUTPATIENT)
Dept: RHEUMATOLOGY | Facility: PHYSICIAN GROUP | Age: 47
End: 2017-07-13
Payer: MEDICARE

## 2017-07-13 ENCOUNTER — OFFICE VISIT (OUTPATIENT)
Dept: NEUROLOGY | Facility: MEDICAL CENTER | Age: 47
End: 2017-07-13
Payer: MEDICARE

## 2017-07-13 VITALS
HEART RATE: 126 BPM | SYSTOLIC BLOOD PRESSURE: 118 MMHG | OXYGEN SATURATION: 95 % | BODY MASS INDEX: 29.38 KG/M2 | WEIGHT: 165.8 LBS | DIASTOLIC BLOOD PRESSURE: 80 MMHG | HEIGHT: 63 IN | TEMPERATURE: 97.8 F

## 2017-07-13 VITALS
TEMPERATURE: 97.1 F | DIASTOLIC BLOOD PRESSURE: 86 MMHG | BODY MASS INDEX: 28.63 KG/M2 | WEIGHT: 161.6 LBS | HEIGHT: 63 IN | OXYGEN SATURATION: 96 % | HEART RATE: 112 BPM | RESPIRATION RATE: 20 BRPM | SYSTOLIC BLOOD PRESSURE: 120 MMHG

## 2017-07-13 DIAGNOSIS — J34.89 NASAL SEPTAL PERFORATION: ICD-10-CM

## 2017-07-13 DIAGNOSIS — R46.89 SPELL OF ABNORMAL BEHAVIOR: ICD-10-CM

## 2017-07-13 DIAGNOSIS — H53.8 FLASHING LIGHT: ICD-10-CM

## 2017-07-13 DIAGNOSIS — G44.82 COITAL HEADACHE: ICD-10-CM

## 2017-07-13 DIAGNOSIS — G43.109 COMPLICATED MIGRAINE: Chronic | ICD-10-CM

## 2017-07-13 DIAGNOSIS — M25.50 POLYARTHRALGIA: ICD-10-CM

## 2017-07-13 PROCEDURE — 99214 OFFICE O/P EST MOD 30 MIN: CPT | Performed by: INTERNAL MEDICINE

## 2017-07-13 PROCEDURE — 99215 OFFICE O/P EST HI 40 MIN: CPT | Performed by: PHYSICIAN ASSISTANT

## 2017-07-13 RX ORDER — DIPHENHYDRAMINE HYDROCHLORIDE 50 MG/ML
50 INJECTION INTRAMUSCULAR; INTRAVENOUS PRN
Qty: 25 SYRINGE | Refills: 11 | Status: ON HOLD | OUTPATIENT
Start: 2017-07-13 | End: 2017-11-18

## 2017-07-13 RX ORDER — TIZANIDINE 4 MG/1
TABLET ORAL
Status: ON HOLD | COMMUNITY
Start: 2017-05-14 | End: 2017-07-24

## 2017-07-13 RX ORDER — PREDNISONE 5 MG/1
5 TABLET ORAL DAILY
Qty: 30 TAB | Refills: 1 | Status: SHIPPED | OUTPATIENT
Start: 2017-07-13 | End: 2017-08-29

## 2017-07-13 RX ORDER — KETOROLAC TROMETHAMINE 30 MG/ML
INJECTION, SOLUTION INTRAMUSCULAR; INTRAVENOUS
COMMUNITY
Start: 2017-06-14 | End: 2017-10-05

## 2017-07-13 RX ORDER — ONDANSETRON 4 MG/1
4 TABLET, ORALLY DISINTEGRATING ORAL EVERY 8 HOURS PRN
Qty: 30 TAB | Refills: 11 | Status: ON HOLD | OUTPATIENT
Start: 2017-07-13 | End: 2018-05-28

## 2017-07-13 RX ORDER — KETOROLAC TROMETHAMINE 30 MG/ML
INJECTION, SOLUTION INTRAMUSCULAR; INTRAVENOUS
COMMUNITY
Start: 2017-07-10 | End: 2017-07-13 | Stop reason: SDUPTHER

## 2017-07-13 RX ORDER — INDOMETHACIN 25 MG/1
CAPSULE ORAL
Qty: 30 CAP | Refills: 11 | Status: SHIPPED | OUTPATIENT
Start: 2017-07-13 | End: 2017-10-14

## 2017-07-13 RX ORDER — ONDANSETRON 4 MG/1
TABLET, ORALLY DISINTEGRATING ORAL
COMMUNITY
Start: 2017-06-04 | End: 2017-07-13 | Stop reason: SDUPTHER

## 2017-07-13 RX ORDER — DIPHENHYDRAMINE HYDROCHLORIDE 50 MG/ML
INJECTION INTRAMUSCULAR; INTRAVENOUS
COMMUNITY
Start: 2017-07-03 | End: 2017-07-13 | Stop reason: SDUPTHER

## 2017-07-13 RX ORDER — KETOROLAC TROMETHAMINE 30 MG/ML
60 INJECTION, SOLUTION INTRAMUSCULAR; INTRAVENOUS PRN
Qty: 50 ML | Refills: 11 | Status: ON HOLD | OUTPATIENT
Start: 2017-07-13 | End: 2017-07-24

## 2017-07-13 ASSESSMENT — ENCOUNTER SYMPTOMS
COUGH: 0
CHILLS: 0
ABDOMINAL PAIN: 0
HEMOPTYSIS: 0

## 2017-07-13 ASSESSMENT — PATIENT HEALTH QUESTIONNAIRE - PHQ9: CLINICAL INTERPRETATION OF PHQ2 SCORE: 2

## 2017-07-13 NOTE — Clinical Note
Please obtain onb and trigger and then call pt to schedule appt. Thanks. Also botox auth for zbigniew

## 2017-07-13 NOTE — PATIENT INSTRUCTIONS
Plan:    Acute/Rescue Medications: max use 9 days monthly - use calendar to track and bring to next visit    Triptan - recommend avoiding because of reported history of stroke  Continue Benadryl and toradol injections  Nausea medication - zofran ODT 4 mg tablets  NSAID    Daily Preventative Treatment:  Vitamins - handout provided - resume magnesium  Daily medicine - already on several medications daily  Exercise program: begin exercising daily      Other:  ONB and/or Trigger point injection - to consider - handout     Alternative medications - MMJ handout provided    Botox authorization will be obtained - we will call you to schedule appt when it is received.    Sex headache - indomethacin 30 minutes prior to when you want to have sex    EEG for headaches that begin after flashing lights - they will call you to schedule this

## 2017-07-13 NOTE — Clinical Note
[facility]   [address], [address]  [place] [address]  Phone: [phone]  Fax: [phone]              Encounter Date: 7/13/2017    Dear Dr. Power,    It was a pleasure seeing your patient, Enedelia Pang, on 7/13/2017. Diagnoses of Nasal septal perforation and Polyarthralgia were pertinent to this visit.     Please find attached progress note which includes the history I obtained from Ms. Pang, my physical examination findings, my impression and recommendations.      Once again, it was a pleasure participating in your patient's care.  Please feel free to contact me if you have any questions or if I can be of any further assistance to your patients.      Sincerely,    Aimee Andrade M.D.  Electronically Signed          PROGRESS NOTE:  Subjective:   Date of Consultation:7/13/2017  2:11 PM  Primary care physician:Elliott Power M.D.    Reason for Consultation:  Ms. Pang  is a pleasant 46 y.o. year old female who presented with follow-up seronegative Rheumatoid arthritis    Seronegative Rheumatoid Arthritis, non erosive  Since last visit she was admitted for infected  shunt.  Her liver enzymes were noted to be elevated.  She is following with GI and will have a liver biopsy.  As we have tapered her steroid she reports that at home she started to develop swelling in the hands and that there were mornings she had severe swelling and was unable to close her hands.    She had an EMG in January which she reports was normal.  She denies any further episodes of epistaxis.  She has not followed up with ENT.      Medications Compliance / Problems: compliant; she does note hair loss. She is not sure if it is related to methotrexate but is noting more hair loss.    Current Medications:  Sulfasalazine 1000 mg BID (off)  Methotrexate 5 tabs weekly q Thursday (off)  Folic acid  Prednisone 5 mg po q day    RHEUM HISTORY:  Ms. Enedelia Pang presented in January 2016 with joint pain  and swelling of hands,knees, and weakness and a 3 month history of septal perforation.  ENT initial evaluation noted no disease activity.  She also has a history of abnormal LFT with unclear etiology.  She has had a work-up with GI in early 2017 which was unrevealing.  Also she has had a recurrent rash described as papules that ulcerate identified as MRSA infections.   Her labs did show thrombocytosis which could be inflammatory and anemia which was determined to be iron deficiency anemia.  Her rheumatologic work-up showed RF(-), MG (-), ANCA (-), SCL 70 (-) and anti centromere (-)  Parvovirus and rubella IgM was negative    Hand xrays did show periarticular osteopenia.    1/9/2017  Hepatitis B surface antigen negative  Hepatitis B core antibody negative  Hepatitis C antibody negative  quantiferon negative   HIV negative     CXR 12/6/2017 was negative for edema     11/2016 Nasal biopsy did not show active disease    Recent Hospitalization  Since her last visit she was admitted to the hospital for visual changes, headaches and confusion. She was found to have an infected  shunt which was replaced. Her headaches resolved. Methotrexate was stopped secondary to transaminitis. On exam there was no evidence of active synovitis. She was discharged on prednisone 10 mg daily. She was also on sulfasalazine 2 tabs 2 times a day 500 mg.     Epistaxis with nasal perforation  Stable  Since her recent hospitalization she has not had an episode of epistaxis.    Elevated liver enzymes  We have stopped methotrexate due to elevated LFT.  She is planned for a liver biopsy.    Pseudotumor cerebri  Since hospitalization doing well and she feels the headaches are now down to a tolerable level.  She is being followed by Dr. Berry and Erika Reece    Iron deficiency anemia  MCV is elevated which could be secondary to her methotrexate  Her anemia has also resolved.    History of retinal disease  She did see ophthalmology     Past Medical  "History:  Pseudotumor cerebri, MRSA infection history, CVA, miraines, pituatary tumor, migraine headaches, septal perforation    Past Medical History   Diagnosis Date   • Migraine with aura, with intractable migraine, so stated, without mention of status migrainosus    • H/O Clostridium difficile infection    • Hydrocephalus      with lumbar shunt   • Hx MRSA infection    • Pituitary abnormality (CMS-HCC)    • Allergy, unspecified not elsewhere classified    • Anxiety    • Depression    • Stroke (CMS-HCC)      2007   • autoimmune      Past Surgical History   Procedure Laterality Date   • Cholecystectomy     • Tonsillectomy     • Appendectomy     • Tubal ligation     • Other       right knee surgery   • Other neurological surg       occipital nerve stimulator   • Irrigation & debridement neuro N/A 6/28/2015     Procedure: IRRIGATION & DEBRIDEMENT NEURO;  Surgeon: Oli Oh III, M.D.;  Location: SURGERY [facility];  Service:    • Lumbar exploration N/A 8/31/2015     Procedure: LUMBAR EXPLORATION- Lumbar shunt removal ;  Surgeon: Oli Oh III, M.D.;  Location: SURGERY [facility];  Service:    • Spinal cord stimulator  12/19/2016     Procedure: SPINAL CORD STIMULATOR FOR: PLACEMENT OF LEFT FLANK OCCIPITAL NERVE STIMULATOR BATTERY PACK;  Surgeon: Oli Oh III, M.D.;  Location: SURGERY [facility];  Service:    •  shunt insertion  5/31/2017     Procedure:  SHUNT INSERTION;  Surgeon: Drew Berry M.D.;  Location: SURGERY [facility];  Service:      Allergies   Allergen Reactions   • Prochlorperazine Swelling     Tongue swelling. Reaction as a teen. (compazine)  Tolerated Phenergan on 02/24/15   • Sumatriptan Unspecified     Historical=\"Heart stops.\" Reaction  between 1995 to 1997   • Vancomycin Shortness of Breath and Rash     Reaction in 2005.  D/W patient 8/31/15 - tolerated loading dose of vancomycin administered on 8/30/15 with some itching to chest with decreased infusion " rate. Red Man Syndrome     Outpatient Encounter Prescriptions as of 7/13/2017   Medication Sig Dispense Refill   • ketorolac (TORADOL) 30 MG/ML Solution      • aspirin EC (ECOTRIN) 81 MG Tablet Delayed Response Take 81 mg by mouth every day.     • diphenhydrAMINE (BENADRYL) 50 MG/ML Solution 1 mL by Intramuscular route as needed. 25 Syringe 11   • ondansetron (ZOFRAN ODT) 4 MG TABLET DISPERSIBLE Take 1 Tab by mouth every 8 hours as needed for Nausea/Vomiting. 30 Tab 11   • indomethacin (INDOCIN) 25 MG Cap 1-2 capsules 30-45 minutes prior to sexual activity to reduce frequency of post-coital headache. 30 Cap 11   • Misc. Devices Misc Hand/Wrist Separate Finger Orthosis for right hand and left hand 2 Each 1   • predniSONE (DELTASONE) 5 MG Tab Take 1 Tab by mouth every day. 30 Tab 1   • [DISCONTINUED] ketorolac (TORADOL) 60 MG/2ML Solution 2 mL by Intramuscular route as needed. 50 mL 11   • Valerian Root 500 MG Cap Take 500 mg by mouth every bedtime.     • risperidone (RISPERDAL) 0.5 MG Tab Take 1 Tab by mouth 2 Times a Day. 60 Tab 3   • folic acid (FOLVITE) 1 MG Tab Take 1 Tab by mouth every day. 30 Tab 11   • ferrous gluconate (FERGON) 324 (38 FE) MG Tab Take 1 Tab by mouth 2 times a day, with meals. 60 Tab 1   • calcium carbonate (CALCIUM CARBONATE) 500 MG Tab Take 1 Tab by mouth 2 times a day, with meals. 90 Tab 1   • omeprazole (PRILOSEC) 20 MG delayed-release capsule Take 1 Cap by mouth every day. 30 Cap 2   • gabapentin (NEURONTIN) 600 MG tablet Take 600 mg by mouth 3 times a day.     • oxycodone immediate release (ROXICODONE) 10 MG immediate release tablet Take 1 Tab by mouth every four hours as needed for Moderate Pain. 100 Tab 0   • Multiple Vitamins-Minerals (MULTIVITAMIN ADULT PO) Take 1 Tab by mouth every day.     • duloxetine (CYMBALTA) 60 MG CPEP delayed-release capsule Take 60 mg by mouth 2 times a day.     • [DISCONTINUED] methotrexate 2.5 MG Tab      • [DISCONTINUED] tizanidine (ZANAFLEX) 4 MG Tab      "  • magnesium oxide (MAG-OX) 400 MG Tab Take 250 mg by mouth every day.     • [DISCONTINUED] predniSONE (DELTASONE) 5 MG Tab Take 2 Tabs by mouth every day. 60 Tab 1   • [DISCONTINUED] sulfaSALAzine (AZULFIDINE) 500 MG Tab Take 2 Tabs by mouth 2 times a day. (Patient not taking: Reported on 7/13/2017) 120 Tab 3   • [DISCONTINUED] vitamin D (VITAMIND D3) 1000 UNIT Tab Take 1 Tab by mouth every day. 60 Tab    • acetaZOLAMIDE SR (DIAMOX) 500 MG CAPSULE SR 12 HR Take 500 mg by mouth 2 times a day.       No facility-administered encounter medications on file as of 7/13/2017.       Social History     Social History   • Marital Status:      Spouse Name: N/A   • Number of Children: N/A   • Years of Education: N/A     Occupational History   • Not on file.     Social History Main Topics   • Smoking status: Never Smoker    • Smokeless tobacco: Never Used   • Alcohol Use: No   • Drug Use: No   • Sexual Activity: Not on file     Other Topics Concern   • Not on file     Social History Narrative      History   Smoking status   • Never Smoker    Smokeless tobacco   • Never Used     History   Alcohol Use No     History   Drug Use No      OB History   No data available      No LMP recorded.    [facility]     No family history on file.    Review of Systems   Constitutional: Negative for chills.   Respiratory: Negative for cough and hemoptysis.    Gastrointestinal: Positive for vomiting. Negative for abdominal pain.   Musculoskeletal: Positive for joint pain.        Hand swelling and joint pain   Skin: Negative for rash.                  Objective:   /86 mmHg  Pulse 112  Temp(Src) 36.2 °C (97.1 °F)  Resp 20  Ht 1.6 m (5' 3\")  Wt 73.301 kg (161 lb 9.6 oz)  BMI 28.63 kg/m2  SpO2 96%    Physical Exam   Constitutional: She is oriented to person, place, and time. She appears well-developed and well-nourished. No distress.   HENT:   Head: Normocephalic and atraumatic.   Right Ear: External ear normal.   Left Ear: External " ear normal.   Eyes: Conjunctivae are normal. Pupils are equal, round, and reactive to light. Right eye exhibits no discharge. Left eye exhibits no discharge. No scleral icterus.   Pulmonary/Chest: Effort normal. No stridor. No respiratory distress.   Abdominal:   Could not appreciate hepatomegaly   Musculoskeletal: She exhibits no edema.   Good ROM of her wrists bilateral flexion and extension.  Good ROM flexion and extension of elbows bilateral. No tenderness or periarticular swelling at the MCP.  Able to make a .   Trace swelling in the left wrist.  No overt swelling in the knees, ankles, feet.  Bilateral MTP tenderness on the squeeze test.   Neurological: She is alert and oriented to person, place, and time.   Skin: Skin is warm and dry. She is not diaphoretic.   No alopecia.    Psychiatric: She has a normal mood and affect. Her behavior is normal. Judgment and thought content normal.       Assessment:     1. Nasal septal perforation  Misc. Devices Misc    REFERRAL TO ENT    ANTI-DNA (DS)    ANTI-SMOOTH MUSCLE ABS    MITOCHONDRIAL (M2) AB    predniSONE (DELTASONE) 5 MG Tab   2. Polyarthralgia  Misc. Devices Misc    REFERRAL TO ENT    ANTI-DNA (DS)    ANTI-SMOOTH MUSCLE ABS    MITOCHONDRIAL (M2) AB    predniSONE (DELTASONE) 5 MG Tab     Labs:    Lab Results   Component Value Date/Time    QUANTIFERON TB GOLD Negative 01/09/2017 06:01 PM     Lab Results   Component Value Date/Time    HEPATITIS B CORS AB,IGM Negative 05/21/2017 02:07 AM    HEPATITIS B SURFACE ANTIGEN Negative 05/21/2017 02:07 AM     Lab Results   Component Value Date/Time    HEPATITIS C ANTIBODY Negative 05/21/2017 02:07 AM     Lab Results   Component Value Date/Time    SODIUM 137 06/03/2017 02:48 AM    POTASSIUM 3.6 06/03/2017 02:48 AM    CHLORIDE 109 06/03/2017 02:48 AM    CO2 21 06/03/2017 02:48 AM    GLUCOSE 89 06/03/2017 02:48 AM    BUN 13 06/03/2017 02:48 AM    CREATININE 0.52 06/03/2017 02:48 AM      Lab Results   Component Value Date/Time     WBC 8.8 06/03/2017 02:48 AM    RBC 3.81* 06/03/2017 02:48 AM    HEMOGLOBIN 12.5 06/03/2017 02:48 AM    HEMATOCRIT 38.5 06/03/2017 02:48 AM    .0* 06/03/2017 02:48 AM    MCH 32.8 06/03/2017 02:48 AM    MCHC 32.5* 06/03/2017 02:48 AM    MPV 9.5 06/03/2017 02:48 AM    NEUTROPHILS-POLYS 66.40 06/03/2017 02:48 AM    LYMPHOCYTES 24.30 06/03/2017 02:48 AM    MONOCYTES 7.50 06/03/2017 02:48 AM    EOSINOPHILS 0.20 06/03/2017 02:48 AM    BASOPHILS 0.80 06/03/2017 02:48 AM    HYPOCHROMIA 1+ 01/11/2017 02:29 PM    ANISOCYTOSIS 1+ 01/11/2017 02:29 PM      Lab Results   Component Value Date/Time    CALCIUM 9.2 06/03/2017 02:48 AM    AST(SGOT) 75* 06/03/2017 02:48 AM    ALT(SGPT) 345* 06/03/2017 02:48 AM    ALKALINE PHOSPHATASE 195* 06/03/2017 02:48 AM    TOTAL BILIRUBIN 0.3 06/03/2017 02:48 AM    ALBUMIN 3.8 06/03/2017 02:48 AM    TOTAL PROTEIN 6.3 06/03/2017 02:48 AM     Lab Results   Component Value Date/Time    RHEUMATOID FACTOR -NEPH- <10 04/17/2017 12:20 PM    ANTINUCLEAR ANTIBODY None Detected 01/06/2017 04:12 AM     Lab Results   Component Value Date/Time    SED RATE WESTERGREN 19 05/21/2017 02:07 AM    STAT C-REACTIVE PROTEIN 8.74* 05/21/2017 02:07 AM     No results found for: CAROL BENITEZVTINTERP  Lab Results   Component Value Date/Time    C3 COMPLEMENT 178.0 01/06/2017 04:12 AM    COMPLEMENT C4 37.0 01/06/2017 04:12 AM    CRYOGLOBULINS NEG 72Hour 04/17/2017 03:53 PM     Lab Results   Component Value Date/Time    ANTI-SCL-70 0 01/06/2017 04:12 AM    ANTI-CENTROMERE B AB 2 01/06/2017 04:12 AM     Lab Results   Component Value Date/Time    ANCA IGG <1:20 01/06/2017 04:12 AM    C3 COMPLEMENT 178.0 01/06/2017 04:12 AM     Lab Results   Component Value Date/Time    COLOR Lt. Yellow 05/20/2017 09:37 PM    SPECIFIC GRAVITY 1.012 05/31/2017 07:19 AM    PH 8.0 05/20/2017 09:37 PM    GLUCOSE Negative 05/20/2017 09:37 PM    KETONES Negative 05/20/2017 09:37 PM    PROTEIN Negative 05/20/2017 09:37 PM     No results  found for: TOTPROTUR, TOTALVOLUME, BNIAOFZE90  No results found for: SSA60, SSA52  Lab Results   Component Value Date/Time    GLYCOHEMOGLOBIN 5.2 04/29/2017 02:09 PM     Lab Results   Component Value Date/Time    CPK TOTAL 40 05/21/2017 02:07 AM     Lab Results   Component Value Date/Time    G-6-PD 20.4* 01/10/2017 01:54 PM     No results found for: [id number]  No results found for: ACESERUM  Lab Results   Component Value Date/Time    25-HYDROXY   VITAMIN D 25 45 01/09/2017 06:00 PM     No results found for: TSH, FREEDIR  Lab Results   Component Value Date/Time    TSH 6.860* 05/21/2017 02:07 AM    FREE T-4 0.76 01/05/2017 11:22 PM     No results found for: MICROSOMALA, ANTITHYROGL  No results found for: IGGLYMEABS  No results found for: ANTIMITOCHO, FACTIN  No results found for: IGA, TTRANSIGA, ENDOIGA  No results found for: FLTYPE, CRYSTALSBDF  No results found for: ISTATICAL  No results found for: ISTATCREAT  No results found for: CTELOPEP  No results found for: GBMABG  No results found for: PTHINTACT  1/27/2015 hepatitis E IgM    1/27/2015 ceruloplasmin = 29.7  1/27/2015 smith antibody = 9 (normal)  1/27/2015 MG negative  1/27/2015 alpha antitrypsin 119 ()  1/27/2015 ferritin = 75 (normal)  1/27/2015 GGT = 327 (0-55)    1/27/2015 IgG 4 = 11  10/31/2016 and 1/27/2015  p ANCA (-), c ANCA (-), PR3 (-)  10/31/2016 MPO (-)  12/7/2014 AST = 23 ALT = 137 Alk phos = 205  6/10/2016 ALT = 921 AST = 821 Alk phos = 537  Labs from JUne 2016 shows low albumin 2.7 and AST 92 and ALT of 370    6/11/2016; AST=92, ALT  = 370 Alk Phos = 331 Hgb = 8.6 MCV = 74.7 t bili = 1.2  10/15/2015: Hgb = 9.1 Plt =   ALT= 357 AST =127 Alk phos = 469 creatinine = 0.7    US LE venous doppler bilateral 6/30/2014  Probable baker's cyst on left lower extremity    Left hand XR 8/14/2014  Soft tissue swelling over the dorsum.  No erorsons no fractures     CT abdomen/Pelvis w/o Contrast 9/4/2014  Small amount of fluid in cul-de-sac no  abnormalities in liver.  Abdominal aorta was normal.  Adrenal glands normal.  coritical medullatry calcifications noted of left kidney    CT head w w/o contrast 10/15/2016  No acute abnormalities and on CT maxillofacial w/ contrast - no evidence of sinusitis    CTchest w/con 1/12/2016  No findings to suggest aortic dissection or pulmonary embolus  Left renal 18 mm cyst in the ventral cortex medially    CT spine lumbar w/o contrast  8/30/2015  Small fluid  Collection posterior junction of the epidural catheter seen right lateral to the L2 posterior spinous process consistent with leakage and/or infection      Blood work performed at outside labs dated May 6, 2017 white blood cell count of 7 total protein is 7.8 albumin is 4.1 monocytes 1010.7 neutrophils were normal at 5.33 and lymphocytes were normal at 9 point correction 0.94 platelets were 233 HEENT hemoglobin  Labs from June 22, 2017 showed a hemoglobin of 14.3 AST was normalized at 27 ALT was elevated at 71 and phosphatase was normal at 180 transferrin saturation was normal at 15% creatinine was normal at 0.6  Transglutaminase IgA antibody was negative on June 22, 2017  Creatinine IgA antibody was pending. On June 22, 2017 endomysial IgA antibody was negative on June 22, 2017  White blood cell was 8.6 platelets were 423 on June 22, 2017  IgG total was 813 which is normal on June 22, 2017 IgA total was 130 which is normal and June 22, 2017  Ceruloplasmin was normal at 35.2 on June 22, 2017 and alpha-1 antitrypsin in the serum was 142 which is normal on June 22, 2017    TTG antibody was negative on June 22, 2017  IgG subclass  IgG 2 subclass was normal at 311, IgG4 subclass was normal at 8, IgG1 subclass was normal at 676 and IgG 3 subclass was slightly elevated at 122 (15-1 02)  Ferritin was normal at 43 on June 22, 2017 creatinine IgG antibody was negative on June 22, 2017. On May 6, 2017 AST was 31 and ALT was normal at 54    Medical Decision Making:  Today's  Assessment / Status / Plan:     Seronegative rheumatoid arthritis with involvement of the small joints of the hands and wrists and periarticular osteopenia on xray  Due to elevated LFT I have asked her to stop her methotrexate and sulfasalazine.  Patient voiced understanding.  Today we will continue prednisone 10 mg po q day.      Septal perforation  ANCA serologies are negative x 3  MG Is negative  Biopsy results did not show active disease  CXR did not note hilar adenopathy  Again I have advised her to follow-up with ENT    Abnormal LFT  Elevated.  She has had an extensive work-up.  She is due for liver biopsy  We will obtain additional labs.     1. Nasal septal perforation  Misc. Devices Misc    REFERRAL TO ENT    ANTI-DNA (DS)    ANTI-SMOOTH MUSCLE ABS    MITOCHONDRIAL (M2) AB    predniSONE (DELTASONE) 5 MG Tab   2. Polyarthralgia  Misc. Devices Misc    REFERRAL TO ENT    ANTI-DNA (DS)    ANTI-SMOOTH MUSCLE ABS    MITOCHONDRIAL (M2) AB    predniSONE (DELTASONE) 5 MG Tab         Return in about 6 weeks (around 8/24/2017).      She agreed and verbalized her agreement and understanding with the current plan. I answered all questions and concerns she has at this time and advised her to call at any time in there interim with questions or concerns in regards to her care.    Thank you for allowing me to participate in her care, I will continue to follow closely.

## 2017-07-13 NOTE — MR AVS SNAPSHOT
"        Enedelia KHAN Bird   2017 2:30 PM   Office Visit   MRN: [id number]    Department:  Rheumatology [facility]   Dept Phone:  [phone]    Description:  Female : 1970   Provider:  Aimee Andrade M.D.           Reason for Visit     Follow-Up           Allergies as of 2017     Allergen Noted Reactions    Prochlorperazine 2015   Swelling    Tongue swelling. Reaction as a teen. (compazine)  Tolerated Phenergan on 02/24/15    Sumatriptan 2015   Unspecified    Historical=\"Heart stops.\" Reaction  between  to     Vancomycin 2015   Shortness of Breath, Rash    Reaction in .  D/W patient 8/31/15 - tolerated loading dose of vancomycin administered on 8/30/15 with some itching to chest with decreased infusion rate. Red Man Syndrome      You were diagnosed with     Nasal septal perforation   [440195]       Polyarthralgia   [230003]         Vital Signs     Blood Pressure Pulse Temperature Respirations Height Weight    120/86 mmHg 112 36.2 °C (97.1 °F) 20 1.6 m (5' 3\") 73.301 kg (161 lb 9.6 oz)    Body Mass Index Oxygen Saturation Smoking Status             28.63 kg/m2 96% Never Smoker          Basic Information     Date Of Birth Sex Race Ethnicity Preferred Language    1970 Female White Non- English      Your appointments     Aug 17, 2017  3:40 PM   Follow Up Visit with Selina Reece PA-C   [facility] (--)    [address]  [place] [address] [phone]           You will be receiving a confirmation call a few days before your appointment from our automated call confirmation system.            Aug 29, 2017  3:00 PM   Follow Up Visit with Aimee Andrade M.D.   [facility] (--)    [address], [address]  [place] [address] [phone]           You will be receiving a confirmation call a few days before your appointment from our automated call confirmation system.              Problem List              ICD-10-CM Priority " Class Noted - Resolved    Paresthesia of right arm R20.2   10/9/2014 - Present    Intestinal infection due to Clostridium difficile A04.7 Medium  10/10/2014 - Present    R Hemiparesis G81.90 High  10/29/2014 - Present    Complicated migraine (Chronic) G43.109 Medium  10/29/2014 - Present    Osteomyelitis of finger of left hand (CMS-HCC) M86.9 Medium  10/30/2014 - Present    Elevated ALT R74.0   11/4/2014 - Present    Intractable migraine G43.919 Medium  3/21/2015 - Present    Low blood pressure reading R03.1   3/22/2015 - Present    Dermatitis- chronic L30.9   3/24/2015 - Present    Wounds, multiple T07   4/23/2015 - Present    Hydrocephalus G91.9   4/23/2015 - Present    Back pain M54.9 High  6/28/2015 - Present    SIRS (systemic inflammatory response syndrome) (CMS-HCC) R65.10   6/28/2015 - Present    S/P  shunt (Chronic) Z98.2   6/29/2015 - Present    Anxiety F41.9 Medium  6/29/2015 - Present    Depression (Chronic) F32.9   6/29/2015 - Present    Headache R51   7/3/2015 - Present    Abdominal pain R10.9   7/3/2015 - Present    Hypokalemia E87.6   8/30/2015 - Present    Lactic acidosis E87.2   8/30/2015 - Present    Hypomagnesemia E83.42   8/30/2015 - Present    Leukocytosis D72.829   8/30/2015 - Present    Sepsis (CMS-HCC) A41.9   8/30/2015 - Present    Intractable low back pain M54.5   8/30/2015 - Present    Hypocalcemia E83.51   8/30/2015 - Present    Septic shock (CMS-HCC) A41.9, R65.21 High  8/31/2015 - Present    Normocytic anemia D64.9   8/31/2015 - Present    Thrombocytopenia (CMS-HCC) D69.6   8/31/2015 - Present    Infected venous access port T80.219A   10/3/2015 - Present    Migraine G43.909   10/4/2015 - Present    Chronic pain (Chronic) G89.29   10/4/2015 - Present    Bacteremia R78.81   10/4/2015 - Present    Chronic pain syndrome G89.4 Low  12/15/2016 - Present    Chronic pain disorder (Chronic) G89.4   12/19/2016 - Present    Swelling of joint of hand M25.449 High  1/6/2017 - Present     Seronegative rheumatoid arthritis (CMS-HCC) M06.00   1/17/2017 - Present    Chest pain R07.9 High  4/29/2017 - Present    H/O: CVA (cerebrovascular accident) (Chronic) Z86.73   4/30/2017 - Present    Suicidal ideation R45.851 High  4/30/2017 - Present    Hyperlipidemia E78.5   5/1/2017 - Present    Metabolic acidosis E87.2   5/1/2017 - Present    Rheumatoid arthritis (CMS-HCC) M06.9   5/1/2017 - Present    Diarrhea R19.7 Medium  5/21/2017 - Present    Status migrainosus G43.901 High  5/21/2017 - Present    LFT elevation R94.5 Medium  5/21/2017 - Present    Whole body pain R52 Medium  5/22/2017 - Present    Rheumatoid arthritis (CMS-HCC) (Chronic) M06.9   5/22/2017 - Present    Nasal congestion R09.81 Medium  5/22/2017 - Present    Nausea and vomiting R11.2   5/26/2017 - Present    Positive blood cultures R78.81   5/26/2017 - Present    Chronic migraine G43.709   7/13/2017 - Present      Health Maintenance        Date Due Completion Dates    IMM DTaP/Tdap/Td Vaccine (1 - Tdap) 8/16/1991 ---    PAP SMEAR 8/16/1993 ---    MAMMOGRAM 8/16/2012 ---    IMM INFLUENZA (1) 9/1/2017 1/11/2017, 10/3/2015, 10/9/2014            Current Immunizations     Influenza Vaccine Quad Inj (Pf) 1/11/2017  8:57 AM, 10/9/2014  9:45 AM    Influenza Vaccine Quad Inj (Preserved) 10/3/2015  4:06 PM      Below and/or attached are the medications your provider expects you to take. Review all of your home medications and newly ordered medications with your provider and/or pharmacist. Follow medication instructions as directed by your provider and/or pharmacist. Please keep your medication list with you and share with your provider. Update the information when medications are discontinued, doses are changed, or new medications (including over-the-counter products) are added; and carry medication information at all times in the event of emergency situations     Allergies:  PROCHLORPERAZINE - Swelling     SUMATRIPTAN - Unspecified     VANCOMYCIN -  Shortness of Breath,Rash               Medications  Valid as of: July 13, 2017 -  2:52 PM    Generic Name Brand Name Tablet Size Instructions for use    AcetaZOLAMIDE (CAPSULE SR 12 HR) DIAMOX 500 MG Take 500 mg by mouth 2 times a day.        Aspirin (Tablet Delayed Response) ECOTRIN 81 MG Take 81 mg by mouth every day.        Cholecalciferol (Tab) VITAMIND D3 1000 UNIT Take 1 Tab by mouth every day.        DiphenhydrAMINE HCl (Solution) BENADRYL 50 MG/ML 1 mL by Intramuscular route as needed.        DULoxetine HCl (Cap DR Particles) CYMBALTA 60 MG Take 60 mg by mouth 2 times a day.        Ferrous Gluconate (Tab) FERGON 324 (38 FE) MG Take 1 Tab by mouth 2 times a day, with meals.        Folic Acid (Tab) FOLVITE 1 MG Take 1 Tab by mouth every day.        Gabapentin (Tab) NEURONTIN 600 MG Take 600 mg by mouth 3 times a day.        Indomethacin (Cap) INDOCIN 25 MG 1-2 capsules 30-45 minutes prior to sexual activity to reduce frequency of post-coital headache.        Ketorolac Tromethamine (Solution) TORADOL 30 MG/ML         Ketorolac Tromethamine (Solution) TORADOL 60 MG/2ML 2 mL by Intramuscular route as needed.        Magnesium Oxide (Tab) MAG- MG Take 250 mg by mouth every day.        Methotrexate Sodium (Tab) methotrexate 2.5 MG         Misc. Devices (Misc) Misc. Devices  Hand/Wrist Separate Finger Orthosis for right hand and left hand        Multiple Vitamins-Minerals   Take 1 Tab by mouth every day.        Omeprazole (CAPSULE DELAYED RELEASE) PRILOSEC 20 MG Take 1 Cap by mouth every day.        Ondansetron (TABLET DISPERSIBLE) ZOFRAN ODT 4 MG Take 1 Tab by mouth every 8 hours as needed for Nausea/Vomiting.        OxyCODONE HCl (Tab) ROXICODONE 10 MG Take 1 Tab by mouth every four hours as needed for Moderate Pain.        Oyster Shell (Tab) OS-ZAIN 500 500 MG Take 1 Tab by mouth 2 times a day, with meals.        PredniSONE (Tab) DELTASONE 5 MG Take 1 Tab by mouth every day.        RisperiDONE (Tab) RISPERDAL  0.5 MG Take 1 Tab by mouth 2 Times a Day.        SulfaSALAzine (Tab) AZULFIDINE 500 MG Take 2 Tabs by mouth 2 times a day.        TiZANidine HCl (Tab) ZANAFLEX 4 MG         Valerian (Cap) Valerian Root 500 MG Take 500 mg by mouth every bedtime.        .                 Medicines prescribed today were sent to:     [facility] - [place], [place] - [address]    [address] [place] [address]    Phone: [phone] Fax: [phone]    Open 24 Hours?: No      Medication refill instructions:       If your prescription bottle indicates you have medication refills left, it is not necessary to call your provider’s office. Please contact your pharmacy and they will refill your medication.    If your prescription bottle indicates you do not have any refills left, you may request refills at any time through one of the following ways: The online [employer] system (except Urgent Care), by calling your provider’s office, or by asking your pharmacy to contact your provider’s office with a refill request. Medication refills are processed only during regular business hours and may not be available until the next business day. Your provider may request additional information or to have a follow-up visit with you prior to refilling your medication.   *Please Note: Medication refills are assigned a new Rx number when refilled electronically. Your pharmacy may indicate that no refills were authorized even though a new prescription for the same medication is available at the pharmacy. Please request the medicine by name with the pharmacy before contacting your provider for a refill.        Your To Do List     Future Labs/Procedures Complete By Expires    ANTI-DNA (DS)  As directed 7/13/2018    Comments:    W/reflex to titer if positive    ANTI-SMOOTH MUSCLE ABS  As directed 7/13/2018    MITOCHONDRIAL (M2) AB  As directed 7/13/2018      Referral     A referral request has been sent to our patient care coordination department. Please allow 3-5  business days for us to process this request and contact you either by phone or mail. If you do not hear from us by the 5th business day, please call us at [phone].           [employer] Access Code: Activation code not generated  Current [employer] Status: Active

## 2017-07-13 NOTE — PROGRESS NOTES
Subjective:   Date of Consultation:7/13/2017  2:11 PM  Primary care physician:Elliott Power M.D.    Reason for Consultation:  Ms. Pang  is a pleasant 46 y.o. year old female who presented with follow-up seronegative Rheumatoid arthritis    Seronegative Rheumatoid Arthritis, non erosive  Since last visit she was admitted for infected  shunt.  Her liver enzymes were noted to be elevated.  She is following with GI and will have a liver biopsy.  As we have tapered her steroid she reports that at home she started to develop swelling in the hands and that there were mornings she had severe swelling and was unable to close her hands.    She had an EMG in January which she reports was normal.  She denies any further episodes of epistaxis.  She has not followed up with ENT.      Medications Compliance / Problems: compliant; she does note hair loss. She is not sure if it is related to methotrexate but is noting more hair loss.    Current Medications:  Sulfasalazine 1000 mg BID (off)  Methotrexate 5 tabs weekly q Thursday (off)  Folic acid  Prednisone 5 mg po q day    RHEUM HISTORY:  Ms. Enedelia Pang presented in January 2016 with joint pain and swelling of hands,knees, and weakness and a 3 month history of septal perforation.  ENT initial evaluation noted no disease activity.  She also has a history of abnormal LFT with unclear etiology.  She has had a work-up with GI in early 2017 which was unrevealing.  Also she has had a recurrent rash described as papules that ulcerate identified as MRSA infections.   Her labs did show thrombocytosis which could be inflammatory and anemia which was determined to be iron deficiency anemia.  Her rheumatologic work-up showed RF(-), MG (-), ANCA (-), SCL 70 (-) and anti centromere (-)  Parvovirus and rubella IgM was negative    Hand xrays did show periarticular osteopenia.    1/9/2017  Hepatitis B surface antigen negative  Hepatitis B core antibody negative  Hepatitis C  antibody negative  quantiferon negative   HIV negative     CXR 12/6/2017 was negative for edema     11/2016 Nasal biopsy did not show active disease    Recent Hospitalization  Since her last visit she was admitted to the hospital for visual changes, headaches and confusion. She was found to have an infected  shunt which was replaced. Her headaches resolved. Methotrexate was stopped secondary to transaminitis. On exam there was no evidence of active synovitis. She was discharged on prednisone 10 mg daily. She was also on sulfasalazine 2 tabs 2 times a day 500 mg.     Epistaxis with nasal perforation  Stable  Since her recent hospitalization she has not had an episode of epistaxis.    Elevated liver enzymes  We have stopped methotrexate due to elevated LFT.  She is planned for a liver biopsy.    Pseudotumor cerebri  Since hospitalization doing well and she feels the headaches are now down to a tolerable level.  She is being followed by Dr. Berry and Erika Reece    Iron deficiency anemia  MCV is elevated which could be secondary to her methotrexate  Her anemia has also resolved.    History of retinal disease  She did see ophthalmology     Past Medical History:  Pseudotumor cerebri, MRSA infection history, CVA, miraines, pituatary tumor, migraine headaches, septal perforation    Past Medical History   Diagnosis Date   • Migraine with aura, with intractable migraine, so stated, without mention of status migrainosus    • H/O Clostridium difficile infection    • Hydrocephalus      with lumbar shunt   • Hx MRSA infection    • Pituitary abnormality ([facility])    • Allergy, unspecified not elsewhere classified    • Anxiety    • Depression    • Stroke ([facility] located [facility])      2007   • autoimmune      Past Surgical History   Procedure Laterality Date   • Cholecystectomy     • Tonsillectomy     • Appendectomy     • Tubal ligation     • Other       right knee surgery   • Other neurological surg       occipital nerve stimulator   •  "Irrigation & debridement neuro N/A 6/28/2015     Procedure: IRRIGATION & DEBRIDEMENT NEURO;  Surgeon: Oli Oh III, M.D.;  Location: SURGERY [facility];  Service:    • Lumbar exploration N/A 8/31/2015     Procedure: LUMBAR EXPLORATION- Lumbar shunt removal ;  Surgeon: Oli Oh III, M.D.;  Location: SURGERY [facility];  Service:    • Spinal cord stimulator  12/19/2016     Procedure: SPINAL CORD STIMULATOR FOR: PLACEMENT OF LEFT FLANK OCCIPITAL NERVE STIMULATOR BATTERY PACK;  Surgeon: Oli Oh III, M.D.;  Location: SURGERY [facility];  Service:    •  shunt insertion  5/31/2017     Procedure:  SHUNT INSERTION;  Surgeon: Drew Berry M.D.;  Location: SURGERY [facility];  Service:      Allergies   Allergen Reactions   • Prochlorperazine Swelling     Tongue swelling. Reaction as a teen. (compazine)  Tolerated Phenergan on 02/24/15   • Sumatriptan Unspecified     Historical=\"Heart stops.\" Reaction  between 1995 to 1997   • Vancomycin Shortness of Breath and Rash     Reaction in 2005.  D/W patient 8/31/15 - tolerated loading dose of vancomycin administered on 8/30/15 with some itching to chest with decreased infusion rate. Red Man Syndrome     Outpatient Encounter Prescriptions as of 7/13/2017   Medication Sig Dispense Refill   • ketorolac (TORADOL) 30 MG/ML Solution      • aspirin EC (ECOTRIN) 81 MG Tablet Delayed Response Take 81 mg by mouth every day.     • diphenhydrAMINE (BENADRYL) 50 MG/ML Solution 1 mL by Intramuscular route as needed. 25 Syringe 11   • ondansetron (ZOFRAN ODT) 4 MG TABLET DISPERSIBLE Take 1 Tab by mouth every 8 hours as needed for Nausea/Vomiting. 30 Tab 11   • indomethacin (INDOCIN) 25 MG Cap 1-2 capsules 30-45 minutes prior to sexual activity to reduce frequency of post-coital headache. 30 Cap 11   • Misc. Devices Misc Hand/Wrist Separate Finger Orthosis for right hand and left hand 2 Each 1   • predniSONE (DELTASONE) 5 MG Tab Take 1 Tab by mouth every " day. 30 Tab 1   • [DISCONTINUED] ketorolac (TORADOL) 60 MG/2ML Solution 2 mL by Intramuscular route as needed. 50 mL 11   • Valerian Root 500 MG Cap Take 500 mg by mouth every bedtime.     • risperidone (RISPERDAL) 0.5 MG Tab Take 1 Tab by mouth 2 Times a Day. 60 Tab 3   • folic acid (FOLVITE) 1 MG Tab Take 1 Tab by mouth every day. 30 Tab 11   • ferrous gluconate (FERGON) 324 (38 FE) MG Tab Take 1 Tab by mouth 2 times a day, with meals. 60 Tab 1   • calcium carbonate (CALCIUM CARBONATE) 500 MG Tab Take 1 Tab by mouth 2 times a day, with meals. 90 Tab 1   • omeprazole (PRILOSEC) 20 MG delayed-release capsule Take 1 Cap by mouth every day. 30 Cap 2   • gabapentin (NEURONTIN) 600 MG tablet Take 600 mg by mouth 3 times a day.     • oxycodone immediate release (ROXICODONE) 10 MG immediate release tablet Take 1 Tab by mouth every four hours as needed for Moderate Pain. 100 Tab 0   • Multiple Vitamins-Minerals (MULTIVITAMIN ADULT PO) Take 1 Tab by mouth every day.     • duloxetine (CYMBALTA) 60 MG CPEP delayed-release capsule Take 60 mg by mouth 2 times a day.     • [DISCONTINUED] methotrexate 2.5 MG Tab      • [DISCONTINUED] tizanidine (ZANAFLEX) 4 MG Tab      • magnesium oxide (MAG-OX) 400 MG Tab Take 250 mg by mouth every day.     • [DISCONTINUED] predniSONE (DELTASONE) 5 MG Tab Take 2 Tabs by mouth every day. 60 Tab 1   • [DISCONTINUED] sulfaSALAzine (AZULFIDINE) 500 MG Tab Take 2 Tabs by mouth 2 times a day. (Patient not taking: Reported on 7/13/2017) 120 Tab 3   • [DISCONTINUED] vitamin D (VITAMIND D3) 1000 UNIT Tab Take 1 Tab by mouth every day. 60 Tab    • acetaZOLAMIDE SR (DIAMOX) 500 MG CAPSULE SR 12 HR Take 500 mg by mouth 2 times a day.       No facility-administered encounter medications on file as of 7/13/2017.       Social History     Social History   • Marital Status:      Spouse Name: N/A   • Number of Children: N/A   • Years of Education: N/A     Occupational History   • Not on file.     Social  "History Main Topics   • Smoking status: Never Smoker    • Smokeless tobacco: Never Used   • Alcohol Use: No   • Drug Use: No   • Sexual Activity: Not on file     Other Topics Concern   • Not on file     Social History Narrative      History   Smoking status   • Never Smoker    Smokeless tobacco   • Never Used     History   Alcohol Use No     History   Drug Use No      OB History   No data available      No LMP recorded.    [facility]     No family history on file.    Review of Systems   Constitutional: Negative for chills.   Respiratory: Negative for cough and hemoptysis.    Gastrointestinal: Positive for vomiting. Negative for abdominal pain.   Musculoskeletal: Positive for joint pain.        Hand swelling and joint pain   Skin: Negative for rash.                  Objective:   /86 mmHg  Pulse 112  Temp(Src) 36.2 °C (97.1 °F)  Resp 20  Ht 1.6 m (5' 3\")  Wt 73.301 kg (161 lb 9.6 oz)  BMI 28.63 kg/m2  SpO2 96%    Physical Exam   Constitutional: She is oriented to person, place, and time. She appears well-developed and well-nourished. No distress.   HENT:   Head: Normocephalic and atraumatic.   Right Ear: External ear normal.   Left Ear: External ear normal.   Eyes: Conjunctivae are normal. Pupils are equal, round, and reactive to light. Right eye exhibits no discharge. Left eye exhibits no discharge. No scleral icterus.   Pulmonary/Chest: Effort normal. No stridor. No respiratory distress.   Abdominal:   Could not appreciate hepatomegaly   Musculoskeletal: She exhibits no edema.   Good ROM of her wrists bilateral flexion and extension.  Good ROM flexion and extension of elbows bilateral. No tenderness or periarticular swelling at the MCP.  Able to make a .   Trace swelling in the left wrist.  No overt swelling in the knees, ankles, feet.  Bilateral MTP tenderness on the squeeze test.   Neurological: She is alert and oriented to person, place, and time.   Skin: Skin is warm and dry. She is not " diaphoretic.   No alopecia.    Psychiatric: She has a normal mood and affect. Her behavior is normal. Judgment and thought content normal.       Assessment:     1. Nasal septal perforation  Misc. Devices Misc    REFERRAL TO ENT    ANTI-DNA (DS)    ANTI-SMOOTH MUSCLE ABS    MITOCHONDRIAL (M2) AB    predniSONE (DELTASONE) 5 MG Tab   2. Polyarthralgia  Misc. Devices Misc    REFERRAL TO ENT    ANTI-DNA (DS)    ANTI-SMOOTH MUSCLE ABS    MITOCHONDRIAL (M2) AB    predniSONE (DELTASONE) 5 MG Tab     Labs:    Lab Results   Component Value Date/Time    QUANTIFERON TB GOLD Negative 01/09/2017 06:01 PM     Lab Results   Component Value Date/Time    HEPATITIS B CORS AB,IGM Negative 05/21/2017 02:07 AM    HEPATITIS B SURFACE ANTIGEN Negative 05/21/2017 02:07 AM     Lab Results   Component Value Date/Time    HEPATITIS C ANTIBODY Negative 05/21/2017 02:07 AM     Lab Results   Component Value Date/Time    SODIUM 137 06/03/2017 02:48 AM    POTASSIUM 3.6 06/03/2017 02:48 AM    CHLORIDE 109 06/03/2017 02:48 AM    CO2 21 06/03/2017 02:48 AM    GLUCOSE 89 06/03/2017 02:48 AM    BUN 13 06/03/2017 02:48 AM    CREATININE 0.52 06/03/2017 02:48 AM      Lab Results   Component Value Date/Time    WBC 8.8 06/03/2017 02:48 AM    RBC 3.81* 06/03/2017 02:48 AM    HEMOGLOBIN 12.5 06/03/2017 02:48 AM    HEMATOCRIT 38.5 06/03/2017 02:48 AM    .0* 06/03/2017 02:48 AM    MCH 32.8 06/03/2017 02:48 AM    MCHC 32.5* 06/03/2017 02:48 AM    MPV 9.5 06/03/2017 02:48 AM    NEUTROPHILS-POLYS 66.40 06/03/2017 02:48 AM    LYMPHOCYTES 24.30 06/03/2017 02:48 AM    MONOCYTES 7.50 06/03/2017 02:48 AM    EOSINOPHILS 0.20 06/03/2017 02:48 AM    BASOPHILS 0.80 06/03/2017 02:48 AM    HYPOCHROMIA 1+ 01/11/2017 02:29 PM    ANISOCYTOSIS 1+ 01/11/2017 02:29 PM      Lab Results   Component Value Date/Time    CALCIUM 9.2 06/03/2017 02:48 AM    AST(SGOT) 75* 06/03/2017 02:48 AM    ALT(SGPT) 345* 06/03/2017 02:48 AM    ALKALINE PHOSPHATASE 195* 06/03/2017 02:48 AM     TOTAL BILIRUBIN 0.3 06/03/2017 02:48 AM    ALBUMIN 3.8 06/03/2017 02:48 AM    TOTAL PROTEIN 6.3 06/03/2017 02:48 AM     Lab Results   Component Value Date/Time    RHEUMATOID FACTOR -NEPH- <10 04/17/2017 12:20 PM    ANTINUCLEAR ANTIBODY None Detected 01/06/2017 04:12 AM     Lab Results   Component Value Date/Time    SED RATE WESTERGREN 19 05/21/2017 02:07 AM    STAT C-REACTIVE PROTEIN 8.74* 05/21/2017 02:07 AM     No results found for: RUSSELVIPER DRVVTINTERP  Lab Results   Component Value Date/Time    C3 COMPLEMENT 178.0 01/06/2017 04:12 AM    COMPLEMENT C4 37.0 01/06/2017 04:12 AM    CRYOGLOBULINS NEG 72Hour 04/17/2017 03:53 PM     Lab Results   Component Value Date/Time    ANTI-SCL-70 0 01/06/2017 04:12 AM    ANTI-CENTROMERE B AB 2 01/06/2017 04:12 AM     Lab Results   Component Value Date/Time    ANCA IGG <1:20 01/06/2017 04:12 AM    C3 COMPLEMENT 178.0 01/06/2017 04:12 AM     Lab Results   Component Value Date/Time    COLOR Lt. Yellow 05/20/2017 09:37 PM    SPECIFIC GRAVITY 1.012 05/31/2017 07:19 AM    PH 8.0 05/20/2017 09:37 PM    GLUCOSE Negative 05/20/2017 09:37 PM    KETONES Negative 05/20/2017 09:37 PM    PROTEIN Negative 05/20/2017 09:37 PM     No results found for: TOTPROTUR, TOTALVOLUME, FECRYMPL01  No results found for: SSA60, SSA52  Lab Results   Component Value Date/Time    GLYCOHEMOGLOBIN 5.2 04/29/2017 02:09 PM     Lab Results   Component Value Date/Time    CPK TOTAL 40 05/21/2017 02:07 AM     Lab Results   Component Value Date/Time    G-6-PD 20.4* 01/10/2017 01:54 PM     No results found for: CSPV50IKQC  No results found for: ACESERUM  Lab Results   Component Value Date/Time    25-HYDROXY   VITAMIN D 25 45 01/09/2017 06:00 PM     No results found for: TSH, FREEDIR  Lab Results   Component Value Date/Time    TSH 6.860* 05/21/2017 02:07 AM    FREE T-4 0.76 01/05/2017 11:22 PM     No results found for: MICROSOMALA, ANTITHYROGL  No results found for: IGGLYMEABS  No results found for: ANTIMITOCHO,  FACTIN  No results found for: IGA, TTRANSIGA, ENDOIGA  No results found for: FLTYPE, CRYSTALSBDF  No results found for: ISTATICAL  No results found for: ISTATCREAT  No results found for: CTELOPEP  No results found for: GBMABG  No results found for: PTHINTACT  1/27/2015 hepatitis E IgM    1/27/2015 ceruloplasmin = 29.7  1/27/2015 smith antibody = 9 (normal)  1/27/2015 MG negative  1/27/2015 alpha antitrypsin 119 ()  1/27/2015 ferritin = 75 (normal)  1/27/2015 GGT = 327 (0-55)    1/27/2015 IgG 4 = 11  10/31/2016 and 1/27/2015  p ANCA (-), c ANCA (-), PR3 (-)  10/31/2016 MPO (-)  12/7/2014 AST = 23 ALT = 137 Alk phos = 205  6/10/2016 ALT = 921 AST = 821 Alk phos = 537  Labs from JUne 2016 shows low albumin 2.7 and AST 92 and ALT of 370    6/11/2016; AST=92, ALT  = 370 Alk Phos = 331 Hgb = 8.6 MCV = 74.7 t bili = 1.2  10/15/2015: Hgb = 9.1 Plt =   ALT= 357 AST =127 Alk phos = 469 creatinine = 0.7    US LE venous doppler bilateral 6/30/2014  Probable baker's cyst on left lower extremity    Left hand XR 8/14/2014  Soft tissue swelling over the dorsum.  No erorsons no fractures     CT abdomen/Pelvis w/o Contrast 9/4/2014  Small amount of fluid in cul-de-sac no abnormalities in liver.  Abdominal aorta was normal.  Adrenal glands normal.  coritical medullatry calcifications noted of left kidney    CT head w w/o contrast 10/15/2016  No acute abnormalities and on CT maxillofacial w/ contrast - no evidence of sinusitis    CTchest w/con 1/12/2016  No findings to suggest aortic dissection or pulmonary embolus  Left renal 18 mm cyst in the ventral cortex medially    CT spine lumbar w/o contrast  8/30/2015  Small fluid  Collection posterior junction of the epidural catheter seen right lateral to the L2 posterior spinous process consistent with leakage and/or infection      Blood work performed at outside labs dated May 6, 2017 white blood cell count of 7 total protein is 7.8 albumin is 4.1 monocytes 1010.7 neutrophils were  normal at 5.33 and lymphocytes were normal at 9 point correction 0.94 platelets were 233 HEENT hemoglobin  Labs from June 22, 2017 showed a hemoglobin of 14.3 AST was normalized at 27 ALT was elevated at 71 and phosphatase was normal at 180 transferrin saturation was normal at 15% creatinine was normal at 0.6  Transglutaminase IgA antibody was negative on June 22, 2017  Creatinine IgA antibody was pending. On June 22, 2017 endomysial IgA antibody was negative on June 22, 2017  White blood cell was 8.6 platelets were 423 on June 22, 2017  IgG total was 813 which is normal on June 22, 2017 IgA total was 130 which is normal and June 22, 2017  Ceruloplasmin was normal at 35.2 on June 22, 2017 and alpha-1 antitrypsin in the serum was 142 which is normal on June 22, 2017    TTG antibody was negative on June 22, 2017  IgG subclass  IgG 2 subclass was normal at 311, IgG4 subclass was normal at 8, IgG1 subclass was normal at 676 and IgG 3 subclass was slightly elevated at 122 (15-1 02)  Ferritin was normal at 43 on June 22, 2017 creatinine IgG antibody was negative on June 22, 2017. On May 6, 2017 AST was 31 and ALT was normal at 54    Medical Decision Making:  Today's Assessment / Status / Plan:     Seronegative rheumatoid arthritis with involvement of the small joints of the hands and wrists and periarticular osteopenia on xray  Due to elevated LFT I have asked her to stop her methotrexate and sulfasalazine.  Patient voiced understanding.  Today we will continue prednisone 10 mg po q day.      Septal perforation  ANCA serologies are negative x 3  MG Is negative  Biopsy results did not show active disease  CXR did not note hilar adenopathy  Again I have advised her to follow-up with ENT    Abnormal LFT  Elevated.  She has had an extensive work-up.  She is due for liver biopsy  We will obtain additional labs.     1. Nasal septal perforation  Misc. Devices Misc    REFERRAL TO ENT    ANTI-DNA (DS)    ANTI-SMOOTH MUSCLE ABS     MITOCHONDRIAL (M2) AB    predniSONE (DELTASONE) 5 MG Tab   2. Polyarthralgia  Misc. Devices Misc    REFERRAL TO ENT    ANTI-DNA (DS)    ANTI-SMOOTH MUSCLE ABS    MITOCHONDRIAL (M2) AB    predniSONE (DELTASONE) 5 MG Tab         Return in about 6 weeks (around 8/24/2017).      She agreed and verbalized her agreement and understanding with the current plan. I answered all questions and concerns she has at this time and advised her to call at any time in there interim with questions or concerns in regards to her care.    Thank you for allowing me to participate in her care, I will continue to follow closely.
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
## 2017-07-13 NOTE — MR AVS SNAPSHOT
"        Enedelia KHAN Bird   2017 10:20 AM   Office Visit   MRN: [id number]    Department:  [facility]   Dept Phone:  [phone]    Description:  Female : 1970   Provider:  Selina Reece PA-C           Reason for Visit     New Patient headaches      Allergies as of 2017     Allergen Noted Reactions    Prochlorperazine 2015   Swelling    Tongue swelling. Reaction as a teen. (compazine)  Tolerated Phenergan on 02/24/15    Sumatriptan 2015   Unspecified    Historical=\"Heart stops.\" Reaction  between  to     Vancomycin 2015   Shortness of Breath, Rash    Reaction in .  D/W patient 8/31/15 - tolerated loading dose of vancomycin administered on 8/30/15 with some itching to chest with decreased infusion rate. Red Man Syndrome      You were diagnosed with     Complicated migraine   [[id number]]         Vital Signs     Blood Pressure Pulse Temperature Height Weight Body Mass Index    118/80 mmHg 126 36.6 °C (97.8 °F) 1.6 m (5' 3\") 75.206 kg (165 lb 12.8 oz) 29.38 kg/m2    Oxygen Saturation Smoking Status                95% Never Smoker           Basic Information     Date Of Birth Sex Race Ethnicity Preferred Language    1970 Female White Non- English      Your appointments     2017  2:30 PM   Follow Up Visit with Aimee Andrade M.D.   [facility] (--)    [address] [place] [id number] [phone]           You will be receiving a confirmation call a few days before your appointment from our automated call confirmation system.              Problem List              ICD-10-CM Priority Class Noted - Resolved    Paresthesia of right arm R20.2   10/9/2014 - Present    Intestinal infection due to Clostridium difficile A04.7 Medium  10/10/2014 - Present    R Hemiparesis G81.90 High  10/29/2014 - Present    Complicated migraine (Chronic) G43.109 Medium  10/29/2014 - Present    Osteomyelitis of finger of left hand (CMS-HCC) M86.9 " Medium  10/30/2014 - Present    Elevated ALT R74.0   11/4/2014 - Present    Intractable migraine G43.919 Medium  3/21/2015 - Present    Low blood pressure reading R03.1   3/22/2015 - Present    Dermatitis- chronic L30.9   3/24/2015 - Present    Wounds, multiple T07   4/23/2015 - Present    Hydrocephalus G91.9   4/23/2015 - Present    Back pain M54.9 High  6/28/2015 - Present    SIRS (systemic inflammatory response syndrome) (CMS-HCC) R65.10   6/28/2015 - Present    S/P  shunt (Chronic) Z98.2   6/29/2015 - Present    Anxiety F41.9 Medium  6/29/2015 - Present    Depression (Chronic) F32.9   6/29/2015 - Present    Headache R51   7/3/2015 - Present    Abdominal pain R10.9   7/3/2015 - Present    Hypokalemia E87.6   8/30/2015 - Present    Lactic acidosis E87.2   8/30/2015 - Present    Hypomagnesemia E83.42   8/30/2015 - Present    Leukocytosis D72.829   8/30/2015 - Present    Sepsis (CMS-HCC) A41.9   8/30/2015 - Present    Intractable low back pain M54.5   8/30/2015 - Present    Hypocalcemia E83.51   8/30/2015 - Present    Septic shock (CMS-HCC) A41.9, R65.21 High  8/31/2015 - Present    Normocytic anemia D64.9   8/31/2015 - Present    Thrombocytopenia (CMS-HCC) D69.6   8/31/2015 - Present    Infected venous access port T80.219A   10/3/2015 - Present    Migraine G43.909   10/4/2015 - Present    Chronic pain (Chronic) G89.29   10/4/2015 - Present    Bacteremia R78.81   10/4/2015 - Present    Chronic pain syndrome G89.4 Low  12/15/2016 - Present    Chronic pain disorder (Chronic) G89.4   12/19/2016 - Present    Swelling of joint of hand M25.449 High  1/6/2017 - Present    Seronegative rheumatoid arthritis (CMS-HCC) M06.00   1/17/2017 - Present    Chest pain R07.9 High  4/29/2017 - Present    H/O: CVA (cerebrovascular accident) (Chronic) Z86.73   4/30/2017 - Present    Suicidal ideation R45.851 High  4/30/2017 - Present    Hyperlipidemia E78.5   5/1/2017 - Present    Metabolic acidosis E87.2   5/1/2017 - Present     Rheumatoid arthritis (CMS-HCC) M06.9   5/1/2017 - Present    Diarrhea R19.7 Medium  5/21/2017 - Present    Status migrainosus G43.901 High  5/21/2017 - Present    LFT elevation R94.5 Medium  5/21/2017 - Present    Whole body pain R52 Medium  5/22/2017 - Present    Rheumatoid arthritis (CMS-HCC) (Chronic) M06.9   5/22/2017 - Present    Nasal congestion R09.81 Medium  5/22/2017 - Present    Nausea and vomiting R11.2   5/26/2017 - Present    Positive blood cultures R78.81   5/26/2017 - Present      Health Maintenance        Date Due Completion Dates    IMM DTaP/Tdap/Td Vaccine (1 - Tdap) 8/16/1991 ---    PAP SMEAR 8/16/1993 ---    MAMMOGRAM 8/16/2012 ---    IMM INFLUENZA (1) 9/1/2017 1/11/2017, 10/3/2015, 10/9/2014            Current Immunizations     Influenza Vaccine Quad Inj (Pf) 1/11/2017  8:57 AM, 10/9/2014  9:45 AM    Influenza Vaccine Quad Inj (Preserved) 10/3/2015  4:06 PM      Below and/or attached are the medications your provider expects you to take. Review all of your home medications and newly ordered medications with your provider and/or pharmacist. Follow medication instructions as directed by your provider and/or pharmacist. Please keep your medication list with you and share with your provider. Update the information when medications are discontinued, doses are changed, or new medications (including over-the-counter products) are added; and carry medication information at all times in the event of emergency situations     Allergies:  PROCHLORPERAZINE - Swelling     SUMATRIPTAN - Unspecified     VANCOMYCIN - Shortness of Breath,Rash               Medications  Valid as of: July 13, 2017 - 11:31 AM    Generic Name Brand Name Tablet Size Instructions for use    AcetaZOLAMIDE (CAPSULE SR 12 HR) DIAMOX 500 MG Take 500 mg by mouth 2 times a day.        Aspirin (Tablet Delayed Response) ECOTRIN 81 MG Take 81 mg by mouth every day.        Cholecalciferol (Tab) VITAMIND D3 1000 UNIT Take 1 Tab by mouth every  day.        DiphenhydrAMINE HCl (Solution) BENADRYL 50 MG/ML 1 mL by Intramuscular route as needed.        DULoxetine HCl (Cap DR Particles) CYMBALTA 60 MG Take 60 mg by mouth 2 times a day.        Ferrous Gluconate (Tab) FERGON 324 (38 FE) MG Take 1 Tab by mouth 2 times a day, with meals.        Folic Acid (Tab) FOLVITE 1 MG Take 1 Tab by mouth every day.        Gabapentin (Tab) NEURONTIN 600 MG Take 600 mg by mouth 3 times a day.        Ketorolac Tromethamine (Solution) TORADOL 30 MG/ML         Ketorolac Tromethamine (Solution) TORADOL 60 MG/2ML 2 mL by Intramuscular route as needed.        Magnesium Oxide (Tab) MAG- MG Take 250 mg by mouth every day.        Methotrexate Sodium (Tab) methotrexate 2.5 MG         Multiple Vitamins-Minerals   Take 1 Tab by mouth every day.        Omeprazole (CAPSULE DELAYED RELEASE) PRILOSEC 20 MG Take 1 Cap by mouth every day.        Ondansetron (TABLET DISPERSIBLE) ZOFRAN ODT 4 MG         OxyCODONE HCl (Tab) ROXICODONE 10 MG Take 1 Tab by mouth every four hours as needed for Moderate Pain.        Oyster Shell (Tab) OS-ZAIN 500 500 MG Take 1 Tab by mouth 2 times a day, with meals.        PredniSONE (Tab) DELTASONE 5 MG Take 2 Tabs by mouth every day.        RisperiDONE (Tab) RISPERDAL 0.5 MG Take 1 Tab by mouth 2 Times a Day.        SulfaSALAzine (Tab) AZULFIDINE 500 MG Take 2 Tabs by mouth 2 times a day.        TiZANidine HCl (Tab) ZANAFLEX 4 MG         Valerian (Cap) Valerian Root 500 MG Take 500 mg by mouth every bedtime.        .                 Medicines prescribed today were sent to:     [facility] - [address] [place] [address]    Phone: [phone] Fax: [phone]    Open 24 Hours?: No      Medication refill instructions:       If your prescription bottle indicates you have medication refills left, it is not necessary to call your provider’s office. Please contact your pharmacy and they will refill your medication.    If your  prescription bottle indicates you do not have any refills left, you may request refills at any time through one of the following ways: The online [facility] system (except Urgent Care), by calling your provider’s office, or by asking your pharmacy to contact your provider’s office with a refill request. Medication refills are processed only during regular business hours and may not be available until the next business day. Your provider may request additional information or to have a follow-up visit with you prior to refilling your medication.   *Please Note: Medication refills are assigned a new Rx number when refilled electronically. Your pharmacy may indicate that no refills were authorized even though a new prescription for the same medication is available at the pharmacy. Please request the medicine by name with the pharmacy before contacting your provider for a refill.        Instructions    Plan:    Acute/Rescue Medications: max use 9 days monthly - use calendar to track and bring to next visit    Triptan - recommend avoiding because of reported history of stroke  Continue Benadryl and toradol injections  Nausea medication - zofran ODT 4 mg tablets  NSAID    Daily Preventative Treatment:  Vitamins - handout provided - resume magnesium  Daily medicine - already on several medications daily  Exercise program: begin exercising daily      Other:  ONB and/or Trigger point injection - to consider - handout     Alternative medications - MMJ handout provided    Botox authorization will be obtained - we will call you to schedule appt when it is received.    Sex headache - indomethacin 30 minutes prior to when you want to have sex    EEG for headaches that begin after flashing lights - they will call you to schedule this                [employer] Access Code: Activation code not generated  Current [facility] Status: Active

## 2017-07-17 ENCOUNTER — TELEPHONE (OUTPATIENT)
Dept: NEUROLOGY | Facility: MEDICAL CENTER | Age: 47
End: 2017-07-17

## 2017-07-17 NOTE — TELEPHONE ENCOUNTER
Message        Please call pt and advise.               ----- Message -----          From: Estelita Aparicio, Med Ass't          Sent: 7/17/2017   3:26 PM            To: Selina Reece PA-C      Pt was informed.

## 2017-07-19 ENCOUNTER — APPOINTMENT (OUTPATIENT)
Dept: RADIOLOGY | Facility: MEDICAL CENTER | Age: 47
End: 2017-07-19
Attending: INTERNAL MEDICINE
Payer: MEDICARE

## 2017-07-24 ENCOUNTER — APPOINTMENT (OUTPATIENT)
Dept: RADIOLOGY | Facility: MEDICAL CENTER | Age: 47
End: 2017-07-24
Attending: NURSE PRACTITIONER
Payer: MEDICARE

## 2017-07-24 ENCOUNTER — HOSPITAL ENCOUNTER (OUTPATIENT)
Facility: MEDICAL CENTER | Age: 47
End: 2017-07-24
Attending: INTERNAL MEDICINE | Admitting: INTERNAL MEDICINE
Payer: MEDICARE

## 2017-07-24 VITALS
TEMPERATURE: 97.4 F | BODY MASS INDEX: 28.63 KG/M2 | RESPIRATION RATE: 15 BRPM | DIASTOLIC BLOOD PRESSURE: 61 MMHG | HEART RATE: 84 BPM | OXYGEN SATURATION: 99 % | WEIGHT: 161.6 LBS | SYSTOLIC BLOOD PRESSURE: 114 MMHG | HEIGHT: 63 IN

## 2017-07-24 DIAGNOSIS — R19.7 DIARRHEA, UNSPECIFIED TYPE: ICD-10-CM

## 2017-07-24 DIAGNOSIS — K29.40 CHRONIC ATROPHIC GASTRITIS WITHOUT BLEEDING: ICD-10-CM

## 2017-07-24 DIAGNOSIS — D50.0 IRON DEFICIENCY ANEMIA SECONDARY TO BLOOD LOSS (CHRONIC): ICD-10-CM

## 2017-07-24 DIAGNOSIS — K21.9 GASTROESOPHAGEAL REFLUX DISEASE, ESOPHAGITIS PRESENCE NOT SPECIFIED: ICD-10-CM

## 2017-07-24 DIAGNOSIS — R10.817 GENERALIZED ABDOMINAL TENDERNESS, REBOUND TENDERNESS PRESENCE NOT SPECIFIED: ICD-10-CM

## 2017-07-24 DIAGNOSIS — R09.A2 GLOBUS SENSATION: ICD-10-CM

## 2017-07-24 DIAGNOSIS — R13.10 DYSPHAGIA, UNSPECIFIED(787.20): ICD-10-CM

## 2017-07-24 DIAGNOSIS — R71.8 ELEVATED MCV: ICD-10-CM

## 2017-07-24 DIAGNOSIS — R74.8 ABNORMAL LIVER ENZYMES: ICD-10-CM

## 2017-07-24 DIAGNOSIS — M25.50 MULTIPLE JOINT PAIN: ICD-10-CM

## 2017-07-24 DIAGNOSIS — R11.2 NAUSEA AND VOMITING, INTRACTABILITY OF VOMITING NOT SPECIFIED, UNSPECIFIED VOMITING TYPE: ICD-10-CM

## 2017-07-24 LAB
INR PPP: 0.99 (ref 0.87–1.13)
PLATELET # BLD AUTO: 308 K/UL (ref 164–446)
PROTHROMBIN TIME: 13.4 SEC (ref 12–14.6)

## 2017-07-24 PROCEDURE — 160002 HCHG RECOVERY MINUTES (STAT)

## 2017-07-24 PROCEDURE — 700111 HCHG RX REV CODE 636 W/ 250 OVERRIDE (IP)

## 2017-07-24 PROCEDURE — 47000 NEEDLE BIOPSY OF LIVER PERQ: CPT

## 2017-07-24 PROCEDURE — 700101 HCHG RX REV CODE 250

## 2017-07-24 PROCEDURE — 85610 PROTHROMBIN TIME: CPT

## 2017-07-24 PROCEDURE — 85049 AUTOMATED PLATELET COUNT: CPT

## 2017-07-24 PROCEDURE — 88307 TISSUE EXAM BY PATHOLOGIST: CPT

## 2017-07-24 PROCEDURE — 88313 SPECIAL STAINS GROUP 2: CPT | Mod: 91

## 2017-07-24 RX ORDER — SODIUM CHLORIDE 9 MG/ML
INJECTION, SOLUTION INTRAVENOUS
Status: DISCONTINUED | OUTPATIENT
Start: 2017-07-24 | End: 2017-07-24 | Stop reason: HOSPADM

## 2017-07-24 RX ORDER — HYDROMORPHONE HYDROCHLORIDE 2 MG/1
4 TABLET ORAL
Status: DISCONTINUED | OUTPATIENT
Start: 2017-07-24 | End: 2017-07-24 | Stop reason: HOSPADM

## 2017-07-24 RX ORDER — HYDROMORPHONE HYDROCHLORIDE 2 MG/1
2 TABLET ORAL
Status: DISCONTINUED | OUTPATIENT
Start: 2017-07-24 | End: 2017-07-24 | Stop reason: HOSPADM

## 2017-07-24 RX ORDER — DIPHENHYDRAMINE HYDROCHLORIDE 50 MG/ML
50 INJECTION INTRAMUSCULAR; INTRAVENOUS ONCE
Status: COMPLETED | OUTPATIENT
Start: 2017-07-24 | End: 2017-07-24

## 2017-07-24 RX ORDER — DIPHENHYDRAMINE HYDROCHLORIDE 50 MG/ML
INJECTION INTRAMUSCULAR; INTRAVENOUS
Status: COMPLETED
Start: 2017-07-24 | End: 2017-07-24

## 2017-07-24 RX ADMIN — DIPHENHYDRAMINE HYDROCHLORIDE 50 MG: 50 INJECTION INTRAMUSCULAR; INTRAVENOUS at 08:45

## 2017-07-24 RX ADMIN — SODIUM CHLORIDE: 9 INJECTION, SOLUTION INTRAVENOUS at 07:20

## 2017-07-24 ASSESSMENT — PAIN SCALES - GENERAL
PAINLEVEL_OUTOF10: 0

## 2017-07-24 NOTE — DISCHARGE INSTRUCTIONS
ACTIVITY: Rest and take it easy for the first 24 hours.  A responsible adult is recommended to remain with you during that time.  It is normal to feel sleepy.  We encourage you to not do anything that requires balance, judgment or coordination.    MILD FLU-LIKE SYMPTOMS ARE NORMAL. YOU MAY EXPERIENCE GENERALIZED MUSCLE ACHES, THROAT IRRITATION, HEADACHE AND/OR SOME NAUSEA.    FOR 24 HOURS DO NOT:  Drive, operate machinery or run household appliances.  Drink beer or alcoholic beverages.   Make important decisions or sign legal documents.    SPECIAL INSTRUCTIONS: *KEEP INCISION DRY FOR 24 HRS**    DIET: To avoid nausea, slowly advance diet as tolerated, avoiding spicy or greasy foods for the first day.  Add more substantial food to your diet according to your physician's instructions.  Babies can be fed formula or breast milk as soon as they are hungry.  INCREASE FLUIDS AND FIBER TO AVOID CONSTIPATION.    SURGICAL DRESSING/BATHING: *MAY SHOWER TOMORROW AFTER 9AM THEN MAY REMOVE BAND-AID.**    FOLLOW-UP APPOINTMENT:  A follow-up appointment should be arranged with your doctor in *DR [facility] [phone] **; call to schedule.    You should CALL YOUR PHYSICIAN if you develop:  Fever greater than 101 degrees F.  Pain not relieved by medication, or persistent nausea or vomiting.  Excessive bleeding (blood soaking through dressing) or unexpected drainage from the wound.  Extreme redness or swelling around the incision site, drainage of pus or foul smelling drainage.  Inability to urinate or empty your bladder within 8 hours.  Problems with breathing or chest pain.    You should call 911 if you develop problems with breathing or chest pain.  If you are unable to contact your doctor or surgical center, you should go to the nearest emergency room or urgent care center.  Physician's telephone #: *[phone]**    If any questions arise, call your doctor.  If your doctor is not available, please feel free to call the Surgical Center at  [phone].  The Center is open Monday through Friday from 7AM to 7PM.  You can also call the HEALTH HOTLINE open 24 hours/day, 7 days/week and speak to a nurse at [phone], or toll free at [phone].    A registered nurse may call you a few days after your surgery to see how you are doing after your procedure.    MEDICATIONS: Resume taking daily medication.  Take prescribed pain medication with food.  If no medication is prescribed, you may take non-aspirin pain medication if needed.  PAIN MEDICATION CAN BE VERY CONSTIPATING.  Take a stool softener or laxative such as senokot, pericolace, or milk of magnesia if needed.      If your physician has prescribed pain medication that includes Acetaminophen (Tylenol), do not take additional Acetaminophen (Tylenol) while taking the prescribed medication.    Depression / Suicide Risk    As you are discharged from this [facility] facility, it is important to learn how to keep safe from harming yourself.    Recognize the warning signs:  · Abrupt changes in personality, positive or negative- including increase in energy   · Giving away possessions  · Change in eating patterns- significant weight changes-  positive or negative  · Change in sleeping patterns- unable to sleep or sleeping all the time   · Unwillingness or inability to communicate  · Depression  · Unusual sadness, discouragement and loneliness  · Talk of wanting to die  · Neglect of personal appearance   · Rebelliousness- reckless behavior  · Withdrawal from people/activities they love  · Confusion- inability to concentrate     If you or a loved one observes any of these behaviors or has concerns about self-harm, here's what you can do:  · Talk about it- your feelings and reasons for harming yourself  · Remove any means that you might use to hurt yourself (examples: pills, rope, extension cords, firearm)  · Get professional help from the community (Mental Health, Substance Abuse, psychological  counseling)  · Do not be alone:Call your Safe Contact- someone whom you trust who will be there for you.  · Call your local CRISIS HOTLINE 349-6803 or 756-721-3905  · Call your local Children's Mobile Crisis Response Team [place] (866) 391-8462 or www.[employer]  · Call the toll free National Suicide Prevention Hotlines   · National Suicide Prevention Lifeline 441-142-XQMH (0689)  · National Hope Line Network 800-SUICIDE (237-3138)

## 2017-07-24 NOTE — OR NURSING
0815    PATIENT RECEIVED FROM IR, S/P LIVER BX UNDER ANESTHESIA.  PATIENT IS A/A/OX4.  IS AT BEDSIDE.  C/O OF HEADACHE.  OK BY MD TO GIVE BENADRYL IV.      0845  BENADRYL IV GIVEN WITH RELIEF.  TAKING PO FLUID AND SNACK WITHOUT DIFFICULTY.    0930   PATIENT RESTING COMFORTABLY.    1000    DC INSTRUCTIONS GIVEN TO PATIENT AND .  VERBALIZED UNDERSTANDING OF ALL.  HL DC.  PATIENT DC TO HOME,  AMBULATORY, ESCORTED OUT BY VOLUNTEER.

## 2017-07-24 NOTE — PROGRESS NOTES
US Procedure Note:    Dr. Iglesias performed a liver biopsy with anesthesia provided by Dr. Youngblood.  The pt tolerated the procedure well, vital signs stable., see anesthesia record.  Bandaid applied to RUQ, pt placed on right side.  Cores x 2 taken to lab.  Report given to Shea FIGUEROA.

## 2017-07-24 NOTE — IP AVS SNAPSHOT
" Home Care Instructions                                                                                                                Name:Enedelia Pang  Medical Record Number:[id number]  CSN: [id number]    YOB: 1970   Age: 46 y.o.  Sex: female  HT:1.6 m (5' 3\") WT: 73.3 kg (161 lb 9.6 oz)          Admit Date: 7/24/2017     Discharge Date:   Today's Date: 7/24/2017  Attending Doctor:  Jaycob Delgado M.D.                  Allergies:  Prochlorperazine; Sumatriptan; and Vancomycin                Discharge Instructions         ACTIVITY: Rest and take it easy for the first 24 hours.  A responsible adult is recommended to remain with you during that time.  It is normal to feel sleepy.  We encourage you to not do anything that requires balance, judgment or coordination.    MILD FLU-LIKE SYMPTOMS ARE NORMAL. YOU MAY EXPERIENCE GENERALIZED MUSCLE ACHES, THROAT IRRITATION, HEADACHE AND/OR SOME NAUSEA.    FOR 24 HOURS DO NOT:  Drive, operate machinery or run household appliances.  Drink beer or alcoholic beverages.   Make important decisions or sign legal documents.    SPECIAL INSTRUCTIONS: *KEEP INCISION DRY FOR 24 HRS**    DIET: To avoid nausea, slowly advance diet as tolerated, avoiding spicy or greasy foods for the first day.  Add more substantial food to your diet according to your physician's instructions.  Babies can be fed formula or breast milk as soon as they are hungry.  INCREASE FLUIDS AND FIBER TO AVOID CONSTIPATION.    SURGICAL DRESSING/BATHING: *MAY SHOWER TOMORROW AFTER 9AM THEN MAY REMOVE BAND-AID.**    FOLLOW-UP APPOINTMENT:  A follow-up appointment should be arranged with your doctor in *DR DELGADO [phone] **; call to schedule.    You should CALL YOUR PHYSICIAN if you develop:  Fever greater than 101 degrees F.  Pain not relieved by medication, or persistent nausea or vomiting.  Excessive bleeding (blood soaking through dressing) or unexpected drainage from the wound.  Extreme redness or swelling " around the incision site, drainage of pus or foul smelling drainage.  Inability to urinate or empty your bladder within 8 hours.  Problems with breathing or chest pain.    You should call 911 if you develop problems with breathing or chest pain.  If you are unable to contact your doctor or surgical center, you should go to the nearest emergency room or urgent care center.  Physician's telephone #: *[phone]**    If any questions arise, call your doctor.  If your doctor is not available, please feel free to call the Surgical Center at [phone].  The Center is open Monday through Friday from 7AM to 7PM.  You can also call the HEALTH HOTLINE open 24 hours/day, 7 days/week and speak to a nurse at [phone], or toll free at [phone].    A registered nurse may call you a few days after your surgery to see how you are doing after your procedure.    MEDICATIONS: Resume taking daily medication.  Take prescribed pain medication with food.  If no medication is prescribed, you may take non-aspirin pain medication if needed.  PAIN MEDICATION CAN BE VERY CONSTIPATING.  Take a stool softener or laxative such as senokot, pericolace, or milk of magnesia if needed.      If your physician has prescribed pain medication that includes Acetaminophen (Tylenol), do not take additional Acetaminophen (Tylenol) while taking the prescribed medication.    Depression / Suicide Risk    As you are discharged from this [facility], it is important to learn how to keep safe from harming yourself.    Recognize the warning signs:  · Abrupt changes in personality, positive or negative- including increase in energy   · Giving away possessions  · Change in eating patterns- significant weight changes-  positive or negative  · Change in sleeping patterns- unable to sleep or sleeping all the time   · Unwillingness or inability to communicate  · Depression  · Unusual sadness, discouragement and loneliness  · Talk of wanting to  die  · Neglect of personal appearance   · Rebelliousness- reckless behavior  · Withdrawal from people/activities they love  · Confusion- inability to concentrate     If you or a loved one observes any of these behaviors or has concerns about self-harm, here's what you can do:  · Talk about it- your feelings and reasons for harming yourself  · Remove any means that you might use to hurt yourself (examples: pills, rope, extension cords, firearm)  · Get professional help from the community (Mental Health, Substance Abuse, psychological counseling)  · Do not be alone:Call your Safe Contact- someone whom you trust who will be there for you.  · Call your local CRISIS HOTLINE 916-3337 or 847-338-5397  · Call your local Children's Mobile Crisis Response Team [place] (999) 148-4992 or www.Opternative  · Call the toll free National Suicide Prevention Hotlines   · National Suicide Prevention Lifeline 576-343-RBHC (4067)  · [employer] Line Network 800-SUICIDE (193-1391)       Medication List      CONTINUE taking these medications        Instructions    Morning Afternoon Evening Bedtime    acetaZOLAMIDE  MG Cp12   Commonly known as:  DIAMOX        Take 500 mg by mouth 2 times a day.   Dose:  500 mg                        aspirin EC 81 MG Tbec   Commonly known as:  ECOTRIN        Take 81 mg by mouth every day.   Dose:  81 mg                        calcium carbonate 500 MG Tabs   Commonly known as:  OS-ZAIN 500        Take 1 Tab by mouth 2 times a day, with meals.   Dose:  500 mg                        diphenhydrAMINE 50 MG/ML Soln   Last time this was given:  50 mg on 7/24/2017  8:45 AM   Commonly known as:  BENADRYL        Doctor's comments:  Substitute bottles for syringe please (epic doesn't give bottles as option)   1 mL by Intramuscular route as needed.   Dose:  50 mg                        duloxetine 60 MG Cpep delayed-release capsule   Commonly known as:  CYMBALTA        Take 60 mg by mouth 2 times a  day.   Dose:  60 mg                        ferrous gluconate 324 (38 FE) MG Tabs   Commonly known as:  FERGON        Take 1 Tab by mouth 2 times a day, with meals.   Dose:  324 mg                        folic acid 1 MG Tabs   Commonly known as:  FOLVITE        Take 1 Tab by mouth every day.   Dose:  1 mg                        gabapentin 600 MG tablet   Commonly known as:  NEURONTIN        Take 600 mg by mouth 3 times a day.   Dose:  600 mg                        indomethacin 25 MG Caps   Commonly known as:  INDOCIN        1-2 capsules 30-45 minutes prior to sexual activity to reduce frequency of post-coital headache.                        ketorolac 30 MG/ML Soln   Commonly known as:  TORADOL                             magnesium oxide 400 MG Tabs   Commonly known as:  MAG-OX        Take 250 mg by mouth every day.   Dose:  250 mg                        Misc. Devices Misc        Hand/Wrist Separate Finger Orthosis for right hand and left hand                        MULTIVITAMIN ADULT PO        Take 1 Tab by mouth every day.   Dose:  1 Tab                        omeprazole 20 MG delayed-release capsule   Commonly known as:  PRILOSEC        Take 1 Cap by mouth every day.   Dose:  20 mg                        ondansetron 4 MG Tbdp   Commonly known as:  ZOFRAN ODT        Take 1 Tab by mouth every 8 hours as needed for Nausea/Vomiting.   Dose:  4 mg                        oxycodone immediate release 10 MG immediate release tablet   Commonly known as:  ROXICODONE        Take 1 Tab by mouth every four hours as needed for Moderate Pain.   Dose:  10 mg                        predniSONE 5 MG Tabs   Commonly known as:  DELTASONE        Take 1 Tab by mouth every day.   Dose:  5 mg                        risperidone 0.5 MG Tabs   Commonly known as:  RISPERDAL        Take 1 Tab by mouth 2 Times a Day.   Dose:  0.5 mg                        Valerian Root 500 MG Caps        Take 500 mg by mouth every bedtime.   Dose:  500 mg                                 Medication Information     Above and/or attached are the medications your physician expects you to take upon discharge. Review all of your home medications and newly ordered medications with your doctor and/or pharmacist. Follow medication instructions as directed by your doctor and/or pharmacist. Please keep your medication list with you and share with your physician. Update the information when medications are discontinued, doses are changed, or new medications (including over-the-counter products) are added; and carry medication information at all times in the event of emergency situations.        Resources     Quit Smoking / Tobacco Use:    I understand the use of any tobacco products increases my chance of suffering from future heart disease or stroke and could cause other illnesses which may shorten my life. Quitting the use of tobacco products is the single most important thing I can do to improve my health. For further information on smoking / tobacco cessation call a Toll Free Quit Line at 1-721.406.5965 (*National Cancer [place]) or 1-545.686.3130 (American Lung Association) or you can access the web based program at www.lungMinilogs.org.    [place] Tobacco Users Help Line:  (427) 666-5283       Toll Free: 1-623.821.1568  Quit Tobacco Program [facility] (996)378-4736    Crisis Hotline:    [place] Crisis Hotline:  5-324-APMDZEU or 1-581.948.2736    [place] Crisis Hotline:    1-297.938.1509 or 816-763-7886    Discharge Survey:   Thank you for choosing [facility]. We hope we did everything we could to make your hospital stay a pleasant one. You may be receiving a survey and we would appreciate your time and participation in answering the questions. Your input is very valuable to us in our efforts to improve our service to our patients and their families.            Signatures     My signature on this form indicates that:    1. I acknowledge receipt and  understanding of these Home Care Instruction.  2. My questions regarding this information have been answered to my satisfaction.  3. I have formulated a plan with my discharge nurse to obtain my prescribed medications for home.    __________________________________      __________________________________                   Patient Signature                                 Guardian/Responsible Adult Signature      __________________________________                 __________       ________                       Nurse Signature                                               Date                 Time

## 2017-07-24 NOTE — OR SURGEON
Immediate Post- Operative Note        PostOp Diagnosis: Elevated LFTs.       Procedure(s): US guided liver biopsy.       Estimated Blood Loss: Less than 5 ml        Complications: None            7/24/2017     0804 AM     Benny Iglesias

## 2017-07-24 NOTE — IP AVS SNAPSHOT
7/24/2017    Enedelia Pang  [address] [place] [address]    Dear Enedelia:    [facility] wants to ensure your discharge home is safe and you or your loved ones have had all of your questions answered regarding your care after you leave the hospital.    Below is a list of resources and contact information should you have any questions regarding your hospital stay, follow-up instructions, or active medical symptoms.    Questions or Concerns Regarding… Contact   Medical Questions Related to Your Discharge  (7 days a week, 8am-5pm) Contact a Nurse Care Coordinator   [phone]   Medical Questions Not Related to Your Discharge  (24 hours a day / 7 days a week)  Contact the Nurse Health Line   [phone]    Medications or Discharge Instructions Refer to your discharge packet   or contact your [facility] Primary Care Provider   [phone]   Follow-up Appointment(s) Schedule your appointment via [employer]   or contact Scheduling [phone]   Billing Review your statement via [employer]  or contact Billing [phone]   Medical Records Review your records via [employer]   or contact Medical Records [phone]     You may receive a telephone call within two days of discharge. This call is to make certain you understand your discharge instructions and have the opportunity to have any questions answered. You can also easily access your medical information, test results and upcoming appointments via the [employer] free online health management tool. You can learn more and sign up at [employer]. For assistance setting up your [employer] account, please call [phone].    Once again, we want to ensure your discharge home is safe and that you have a clear understanding of any next steps in your care. If you have any questions or concerns, please do not hesitate to contact us, we are here for you. Thank you for choosing [facility] for your healthcare needs.    Sincerely,    Your [facility] Healthcare Team

## 2017-07-25 ENCOUNTER — OFFICE VISIT (OUTPATIENT)
Dept: NEUROLOGY | Facility: MEDICAL CENTER | Age: 47
End: 2017-07-25
Payer: MEDICARE

## 2017-07-25 VITALS
HEIGHT: 63 IN | BODY MASS INDEX: 29.09 KG/M2 | DIASTOLIC BLOOD PRESSURE: 86 MMHG | WEIGHT: 164.2 LBS | HEART RATE: 106 BPM | OXYGEN SATURATION: 98 % | SYSTOLIC BLOOD PRESSURE: 120 MMHG | TEMPERATURE: 97.9 F

## 2017-07-25 PROCEDURE — 64615 CHEMODENERV MUSC MIGRAINE: CPT | Performed by: PHYSICIAN ASSISTANT

## 2017-07-25 NOTE — PROGRESS NOTES
I treated this patient in clinic today with BotoxA 155 units according to the dosing/injection paradigm currently mandated by the FDA for the management of chronic migraine. Specifically, I injected 5 units to the procerus, 5 units to the corrugators bilaterally, a total of 20 units to the frontalis musculature, 20 units to the temporalis bilaterally, 15 units to the occipitalis bilaterally, 10 units to the cervical paraspinals bilaterally and 15 units to the trapezius musculature bilaterally. The remainder of the Botox 200 units mixed but not administered was discarded as wastage per FDA guidelines.

## 2017-07-25 NOTE — MR AVS SNAPSHOT
"Enedelia Pang   2017 4:40 PM   Office Visit   MRN: [id number]    Department:  [facility]   Dept Phone:  [phone]    Description:  Female : 1970   Provider:  Selina Reece PA-C           Reason for Visit     Botox Injection chronic migraines      Allergies as of 2017     Allergen Noted Reactions    Prochlorperazine 2015   Swelling    Tongue swelling. Reaction as a teen. (compazine)  Tolerated Phenergan on 02/24/15    Sumatriptan 2015   Unspecified    Historical=\"Heart stops.\" Reaction  between  to     Vancomycin 2015   Shortness of Breath, Rash    Reaction in .  D/W patient 8/31/15 - tolerated loading dose of vancomycin administered on 8/30/15 with some itching to chest with decreased infusion rate. Red Man Syndrome      You were diagnosed with     Chronic migraine   [576053]         Vital Signs     Blood Pressure Pulse Temperature Height Weight Body Mass Index    120/86 mmHg 106 36.6 °C (97.9 °F) 1.6 m (5' 2.99\") 74.481 kg (164 lb 3.2 oz) 29.09 kg/m2    Oxygen Saturation Smoking Status                98% Never Smoker           Basic Information     Date Of Birth Sex Race Ethnicity Preferred Language    1970 Female White Non- English      Your appointments     2017  4:40 PM   BOTOX with Selina Reece PA-C   [facility]rology (--)    [address], [address]  [facility] [id number] [phone]            2017  9:30 AM   US ABDOMEN FASTING (30 MINUTES) with [address]   [facility] IMAGING - ULTRASOUND - [address]    [address]  [facility] [address] [phone]           Some exams require specific prep instructions that would have been given to you at time of scheduling. If you have any additional questions about the prep instructions, please call Imaging Scheduling at [phone] and press #2.            Aug 17, 2017  3:40 PM   Follow Up Visit with Selina Reece PA-C   [facility] (--)    [address], [address]  [place] [id number] [phone]           You will be receiving a confirmation call a few days before your appointment from our automated call confirmation system.            Aug 29, 2017  3:00 PM   Follow Up Visit with Aimee Andrade M.D.   [facility] (--)    [address], [address]  [place] [address] [phone]           You will be receiving a confirmation call a few days before your appointment from our automated call confirmation system.            Oct 13, 2017 10:30 AM   ELECTROENCEPHALOGRAPHY with NEURODIAGNOSTIC LAB [facility]rology (--)    [address], [address]  [place] [address] [phone]           Limit caffeine. Clean, dry hair, no gels, oils, or hairsprays. If testing for seizures or spells, please allow no more than 4 hours of sleep the night before the exam (sleep 12am-4am).              Problem List              ICD-10-CM Priority Class Noted - Resolved    Paresthesia of right arm R20.2   10/9/2014 - Present    Intestinal infection due to Clostridium difficile A04.7 Medium  10/10/2014 - Present    R Hemiparesis G81.90 High  10/29/2014 - Present    Complicated migraine (Chronic) G43.109 Medium  10/29/2014 - Present    Osteomyelitis of finger of left hand (CMS-HCC) M86.9 Medium  10/30/2014 - Present    Elevated ALT R74.0   11/4/2014 - Present    Intractable migraine G43.919 Medium  3/21/2015 - Present    Low blood pressure reading R03.1   3/22/2015 - Present    Dermatitis- chronic L30.9   3/24/2015 - Present    Wounds, multiple T07   4/23/2015 - Present    Hydrocephalus G91.9   4/23/2015 - Present    Back pain M54.9 High  6/28/2015 - Present    SIRS (systemic inflammatory response syndrome) (CMS-HCC) R65.10   6/28/2015 - Present    S/P  shunt (Chronic) Z98.2   6/29/2015 - Present    Anxiety F41.9 Medium  6/29/2015 - Present    Depression (Chronic) F32.9   6/29/2015 - Present    Headache R51    7/3/2015 - Present    Abdominal pain R10.9   7/3/2015 - Present    Hypokalemia E87.6   8/30/2015 - Present    Lactic acidosis E87.2   8/30/2015 - Present    Hypomagnesemia E83.42   8/30/2015 - Present    Leukocytosis D72.829   8/30/2015 - Present    Sepsis (CMS-HCC) A41.9   8/30/2015 - Present    Intractable low back pain M54.5   8/30/2015 - Present    Hypocalcemia E83.51   8/30/2015 - Present    Septic shock (CMS-HCC) A41.9, R65.21 High  8/31/2015 - Present    Normocytic anemia D64.9   8/31/2015 - Present    Thrombocytopenia (CMS-HCC) D69.6   8/31/2015 - Present    Infected venous access port T80.219A   10/3/2015 - Present    Migraine G43.909   10/4/2015 - Present    Chronic pain (Chronic) G89.29   10/4/2015 - Present    Bacteremia R78.81   10/4/2015 - Present    Chronic pain syndrome G89.4 Low  12/15/2016 - Present    Chronic pain disorder (Chronic) G89.4   12/19/2016 - Present    Swelling of joint of hand M25.449 High  1/6/2017 - Present    Seronegative rheumatoid arthritis (CMS-HCC) M06.00   1/17/2017 - Present    Chest pain R07.9 High  4/29/2017 - Present    H/O: CVA (cerebrovascular accident) (Chronic) Z86.73   4/30/2017 - Present    Suicidal ideation R45.851 High  4/30/2017 - Present    Hyperlipidemia E78.5   5/1/2017 - Present    Metabolic acidosis E87.2   5/1/2017 - Present    Rheumatoid arthritis (CMS-HCC) M06.9   5/1/2017 - Present    Diarrhea R19.7 Medium  5/21/2017 - Present    Status migrainosus G43.901 High  5/21/2017 - Present    LFT elevation R94.5 Medium  5/21/2017 - Present    Whole body pain R52 Medium  5/22/2017 - Present    Rheumatoid arthritis (CMS-HCC) (Chronic) M06.9   5/22/2017 - Present    Nasal congestion R09.81 Medium  5/22/2017 - Present    Nausea and vomiting R11.2   5/26/2017 - Present    Positive blood cultures R78.81   5/26/2017 - Present    Chronic migraine G43.709   7/13/2017 - Present      Health Maintenance        Date Due Completion Dates    IMM DTaP/Tdap/Td Vaccine (1 - Tdap)  8/16/1991 ---    PAP SMEAR 8/16/1993 ---    MAMMOGRAM 8/16/2012 ---    IMM INFLUENZA (1) 9/1/2017 1/11/2017, 10/3/2015, 10/9/2014            Current Immunizations     Influenza Vaccine Quad Inj (Pf) 1/11/2017  8:57 AM, 10/9/2014  9:45 AM    Influenza Vaccine Quad Inj (Preserved) 10/3/2015  4:06 PM      Below and/or attached are the medications your provider expects you to take. Review all of your home medications and newly ordered medications with your provider and/or pharmacist. Follow medication instructions as directed by your provider and/or pharmacist. Please keep your medication list with you and share with your provider. Update the information when medications are discontinued, doses are changed, or new medications (including over-the-counter products) are added; and carry medication information at all times in the event of emergency situations     Allergies:  PROCHLORPERAZINE - Swelling     SUMATRIPTAN - Unspecified     VANCOMYCIN - Shortness of Breath,Rash               Medications  Valid as of: July 25, 2017 -  3:08 PM    Generic Name Brand Name Tablet Size Instructions for use    AcetaZOLAMIDE (CAPSULE SR 12 HR) DIAMOX 500 MG Take 500 mg by mouth 2 times a day.        Aspirin (Tablet Delayed Response) ECOTRIN 81 MG Take 81 mg by mouth every day.        DiphenhydrAMINE HCl (Solution) BENADRYL 50 MG/ML 1 mL by Intramuscular route as needed.        DULoxetine HCl (Cap DR Particles) CYMBALTA 60 MG Take 60 mg by mouth 2 times a day.        Ferrous Gluconate (Tab) FERGON 324 (38 FE) MG Take 1 Tab by mouth 2 times a day, with meals.        Folic Acid (Tab) FOLVITE 1 MG Take 1 Tab by mouth every day.        Gabapentin (Tab) NEURONTIN 600 MG Take 600 mg by mouth 3 times a day.        Indomethacin (Cap) INDOCIN 25 MG 1-2 capsules 30-45 minutes prior to sexual activity to reduce frequency of post-coital headache.        Ketorolac Tromethamine (Solution) TORADOL 30 MG/ML         Magnesium Oxide (Tab) MAG- MG  Take 250 mg by mouth every day.        Misc. Devices (Misc) Misc. Devices  Hand/Wrist Separate Finger Orthosis for right hand and left hand        Multiple Vitamins-Minerals   Take 1 Tab by mouth every day.        Omeprazole (CAPSULE DELAYED RELEASE) PRILOSEC 20 MG Take 1 Cap by mouth every day.        Ondansetron (TABLET DISPERSIBLE) ZOFRAN ODT 4 MG Take 1 Tab by mouth every 8 hours as needed for Nausea/Vomiting.        OxyCODONE HCl (Tab) ROXICODONE 10 MG Take 1 Tab by mouth every four hours as needed for Moderate Pain.        Oyster Shell (Tab) OS-ZAIN 500 500 MG Take 1 Tab by mouth 2 times a day, with meals.        PredniSONE (Tab) DELTASONE 5 MG Take 1 Tab by mouth every day.        RisperiDONE (Tab) RISPERDAL 0.5 MG Take 1 Tab by mouth 2 Times a Day.        Valerian (Cap) Valerian Root 500 MG Take 500 mg by mouth every bedtime.        .                 Medicines prescribed today were sent to:     [facility] [address]    [facility] [address] [address]    Phone: [phone] Fax: [phone]    Open 24 Hours?: No      Medication refill instructions:       If your prescription bottle indicates you have medication refills left, it is not necessary to call your provider’s office. Please contact your pharmacy and they will refill your medication.    If your prescription bottle indicates you do not have any refills left, you may request refills at any time through one of the following ways: The online [employer] system (except Urgent Care), by calling your provider’s office, or by asking your pharmacy to contact your provider’s office with a refill request. Medication refills are processed only during regular business hours and may not be available until the next business day. Your provider may request additional information or to have a follow-up visit with you prior to refilling your medication.   *Please Note: Medication refills are assigned a new Rx number when refilled electronically. Your  pharmacy may indicate that no refills were authorized even though a new prescription for the same medication is available at the pharmacy. Please request the medicine by name with the pharmacy before contacting your provider for a refill.           MyChart Access Code: Activation code not generated  Current QC Corpt Status: Active

## 2017-07-27 ENCOUNTER — HOSPITAL ENCOUNTER (OUTPATIENT)
Dept: RADIOLOGY | Facility: MEDICAL CENTER | Age: 47
End: 2017-07-27
Attending: NURSE PRACTITIONER
Payer: MEDICARE

## 2017-07-27 DIAGNOSIS — R13.10 DYSPHAGIA, UNSPECIFIED TYPE: ICD-10-CM

## 2017-07-27 PROCEDURE — 76700 US EXAM ABDOM COMPLETE: CPT

## 2017-08-04 ENCOUNTER — TELEPHONE (OUTPATIENT)
Dept: NEUROLOGY | Facility: MEDICAL CENTER | Age: 47
End: 2017-08-04

## 2017-08-04 NOTE — TELEPHONE ENCOUNTER
Received a call from rosa maria from [facility] [place] regarding pt's rx for oxygen tank. I informed rosa maria that pt does not suffer from lower than 80% of oxygen and does not have lung disease. Pt is aware that her insurance will not cover oxygen for migraines. And if she is still interested she would have to buy the tanks out of pocket.

## 2017-08-05 ASSESSMENT — ENCOUNTER SYMPTOMS: VOMITING: 1

## 2017-08-16 ENCOUNTER — OFFICE VISIT (OUTPATIENT)
Dept: NEUROLOGY | Facility: MEDICAL CENTER | Age: 47
End: 2017-08-16
Payer: MEDICARE

## 2017-08-16 VITALS
SYSTOLIC BLOOD PRESSURE: 110 MMHG | DIASTOLIC BLOOD PRESSURE: 80 MMHG | HEIGHT: 62 IN | HEART RATE: 111 BPM | BODY MASS INDEX: 30.36 KG/M2 | OXYGEN SATURATION: 92 % | TEMPERATURE: 96.8 F | WEIGHT: 165 LBS

## 2017-08-16 PROCEDURE — 64450 NJX AA&/STRD OTHER PN/BRANCH: CPT | Mod: 50,51 | Performed by: PHYSICIAN ASSISTANT

## 2017-08-16 PROCEDURE — 64400 NJX AA&/STRD TRIGEMINAL NRV: CPT | Mod: 50 | Performed by: PHYSICIAN ASSISTANT

## 2017-08-16 PROCEDURE — S0020 INJECTION, BUPIVICAINE HYDRO: HCPCS | Performed by: PHYSICIAN ASSISTANT

## 2017-08-16 PROCEDURE — 64405 NJX AA&/STRD GR OCPL NRV: CPT | Mod: 50,51 | Performed by: PHYSICIAN ASSISTANT

## 2017-08-16 RX ORDER — PROMETHAZINE HYDROCHLORIDE 25 MG/ML
12.5 INJECTION, SOLUTION INTRAMUSCULAR; INTRAVENOUS ONCE
Status: COMPLETED | OUTPATIENT
Start: 2017-08-16 | End: 2017-08-16

## 2017-08-16 RX ORDER — KETOROLAC TROMETHAMINE 30 MG/ML
60 INJECTION, SOLUTION INTRAMUSCULAR; INTRAVENOUS ONCE
Status: COMPLETED | OUTPATIENT
Start: 2017-08-16 | End: 2017-08-16

## 2017-08-16 RX ORDER — BUPIVACAINE HYDROCHLORIDE 5 MG/ML
20 INJECTION, SOLUTION EPIDURAL; INTRACAUDAL ONCE
Status: COMPLETED | OUTPATIENT
Start: 2017-08-16 | End: 2017-08-16

## 2017-08-16 RX ADMIN — BUPIVACAINE HYDROCHLORIDE 20 ML: 5 INJECTION, SOLUTION EPIDURAL; INTRACAUDAL at 09:38

## 2017-08-16 RX ADMIN — PROMETHAZINE HYDROCHLORIDE 12.5 MG: 25 INJECTION, SOLUTION INTRAMUSCULAR; INTRAVENOUS at 09:25

## 2017-08-16 RX ADMIN — KETOROLAC TROMETHAMINE 60 MG: 30 INJECTION, SOLUTION INTRAMUSCULAR; INTRAVENOUS at 09:24

## 2017-08-16 NOTE — PROGRESS NOTES
BONB procedure:  I performed a bilateral occipital nerve block in clinic today, injecting bupivacaine [5] cc and triamcinolone [40] mg in both sides.  The patient tolerated the procedure well; there were no complications.      I also injected bilateral trigeminal nerves in clinic today, injecting bupivacaine 1.5 cc  and 3.5 cc bupivicaine in bilateral lesser occipital nerves without any complications and the procedure was well tolerated.    I also injected bilateral supraorbital nerves in clinic today, injecting bupivacaine 1. cc  without any complications and the procedure was well tolerated.    MA administered toradol and phenergan

## 2017-08-16 NOTE — MR AVS SNAPSHOT
"Enedelia Pang   2017 9:00 AM   Office Visit   MRN: [id number]    Department:  [facility]   Dept Phone:  [phone]    Description:  Female : 1970   Provider:  Selina Reece PA-C           Reason for Visit     Migraine ONB injection      Allergies as of 2017     Allergen Noted Reactions    Prochlorperazine 2015   Swelling    Tongue swelling. Reaction as a teen. (compazine)  Tolerated Phenergan on 02/24/15    Sumatriptan 2015   Unspecified    Historical=\"Heart stops.\" Reaction  between  to     Vancomycin 2015   Shortness of Breath, Rash    Reaction in .  D/W patient 8/31/15 - tolerated loading dose of vancomycin administered on 8/30/15 with some itching to chest with decreased infusion rate. Red Man Syndrome      You were diagnosed with     Chronic migraine   [[id number]]         Vital Signs     Blood Pressure Pulse Temperature Height Weight Body Mass Index    110/80 mmHg 111 36 °C (96.8 °F) 1.575 m (5' 2.01\") 74.844 kg (165 lb) 30.17 kg/m2    Oxygen Saturation Smoking Status                92% Never Smoker           Basic Information     Date Of Birth Sex Race Ethnicity Preferred Language    1970 Female White Non- English      Your appointments     Aug 17, 2017  3:40 PM   Follow Up Visit with Selina Reece PA-C   [facility] (--)    [address]  [place] [facility] [address] [phone]           You will be receiving a confirmation call a few days before your appointment from our automated call confirmation system.            Aug 29, 2017  3:00 PM   Follow Up Visit with Aimee Andrade M.D.   [facility] (--)    [address], [address]  [place] [address] [phone]           You will be receiving a confirmation call a few days before your appointment from our automated call confirmation system.            Oct 13, 2017 10:30 AM   ELECTROENCEPHALOGRAPHY with NEURODIAGNOSTIC LAB [facility]   [facility] (--)    [address], [address]  [facility] [place] [facility] [address]   [phone]           Limit caffeine. Clean, dry hair, no gels, oils, or hairsprays. If testing for seizures or spells, please allow no more than 4 hours of sleep the night before the exam (sleep 12am-4am).            Oct 17, 2017  3:40 PM   BOTOX with Selina Reece PA-C   [facility] (--)    [address], [address]  [facility] [place] [facility] [address]   [phone]              Problem List              ICD-10-CM Priority Class Noted - Resolved    Paresthesia of right arm R20.2   10/9/2014 - Present    Intestinal infection due to Clostridium difficile A04.7 Medium  10/10/2014 - Present    R Hemiparesis G81.90 High  10/29/2014 - Present    Complicated migraine (Chronic) G43.109 Medium  10/29/2014 - Present    Osteomyelitis of finger of left hand (CMS-HCC) M86.9 Medium  10/30/2014 - Present    Elevated ALT R74.0   11/4/2014 - Present    Intractable migraine G43.919 Medium  3/21/2015 - Present    Low blood pressure reading R03.1   3/22/2015 - Present    Dermatitis- chronic L30.9   3/24/2015 - Present    Wounds, multiple T07   4/23/2015 - Present    Hydrocephalus G91.9   4/23/2015 - Present    Back pain M54.9 High  6/28/2015 - Present    SIRS (systemic inflammatory response syndrome) (CMS-HCC) R65.10   6/28/2015 - Present    S/P  shunt (Chronic) Z98.2   6/29/2015 - Present    Anxiety F41.9 Medium  6/29/2015 - Present    Depression (Chronic) F32.9   6/29/2015 - Present    Headache R51   7/3/2015 - Present    Abdominal pain R10.9   7/3/2015 - Present    Hypokalemia E87.6   8/30/2015 - Present    Lactic acidosis E87.2   8/30/2015 - Present    Hypomagnesemia E83.42   8/30/2015 - Present    Leukocytosis D72.829   8/30/2015 - Present    Sepsis (CMS-HCC) A41.9   8/30/2015 - Present    Intractable low back pain M54.5   8/30/2015 - Present    Hypocalcemia E83.51   8/30/2015 - Present    Septic shock (CMS-HCC) A41.9, R65.21 High  8/31/2015  - Present    Normocytic anemia D64.9   8/31/2015 - Present    Thrombocytopenia (CMS-HCC) D69.6   8/31/2015 - Present    Infected venous access port T80.219A   10/3/2015 - Present    Migraine G43.909   10/4/2015 - Present    Chronic pain (Chronic) G89.29   10/4/2015 - Present    Bacteremia R78.81   10/4/2015 - Present    Chronic pain syndrome G89.4 Low  12/15/2016 - Present    Chronic pain disorder (Chronic) G89.4   12/19/2016 - Present    Swelling of joint of hand M25.449 High  1/6/2017 - Present    Seronegative rheumatoid arthritis (CMS-HCC) M06.00   1/17/2017 - Present    Chest pain R07.9 High  4/29/2017 - Present    H/O: CVA (cerebrovascular accident) (Chronic) Z86.73   4/30/2017 - Present    Suicidal ideation R45.851 High  4/30/2017 - Present    Hyperlipidemia E78.5   5/1/2017 - Present    Metabolic acidosis E87.2   5/1/2017 - Present    Rheumatoid arthritis (CMS-HCC) M06.9   5/1/2017 - Present    Diarrhea R19.7 Medium  5/21/2017 - Present    Status migrainosus G43.901 High  5/21/2017 - Present    LFT elevation R94.5 Medium  5/21/2017 - Present    Whole body pain R52 Medium  5/22/2017 - Present    Rheumatoid arthritis (CMS-HCC) (Chronic) M06.9   5/22/2017 - Present    Nasal congestion R09.81 Medium  5/22/2017 - Present    Nausea and vomiting R11.2   5/26/2017 - Present    Positive blood cultures R78.81   5/26/2017 - Present    Chronic migraine G43.709   7/13/2017 - Present      Health Maintenance        Date Due Completion Dates    IMM DTaP/Tdap/Td Vaccine (1 - Tdap) 8/16/1991 ---    PAP SMEAR 8/16/1993 ---    MAMMOGRAM 8/16/2012 ---    IMM INFLUENZA (1) 9/1/2017 1/11/2017, 10/3/2015, 10/9/2014            Current Immunizations     Influenza Vaccine Quad Inj (Pf) 1/11/2017  8:57 AM, 10/9/2014  9:45 AM    Influenza Vaccine Quad Inj (Preserved) 10/3/2015  4:06 PM      Below and/or attached are the medications your provider expects you to take. Review all of your home medications and newly ordered medications with  your provider and/or pharmacist. Follow medication instructions as directed by your provider and/or pharmacist. Please keep your medication list with you and share with your provider. Update the information when medications are discontinued, doses are changed, or new medications (including over-the-counter products) are added; and carry medication information at all times in the event of emergency situations     Allergies:  PROCHLORPERAZINE - Swelling     SUMATRIPTAN - Unspecified     VANCOMYCIN - Shortness of Breath,Rash               Medications  Valid as of: August 16, 2017 -  9:28 AM    Generic Name Brand Name Tablet Size Instructions for use    AcetaZOLAMIDE (CAPSULE SR 12 HR) DIAMOX 500 MG Take 500 mg by mouth 2 times a day.        Aspirin (Tablet Delayed Response) ECOTRIN 81 MG Take 81 mg by mouth every day.        DiphenhydrAMINE HCl (Solution) BENADRYL 50 MG/ML 1 mL by Intramuscular route as needed.        DULoxetine HCl (Cap DR Particles) CYMBALTA 60 MG Take 60 mg by mouth 2 times a day.        Ferrous Gluconate (Tab) FERGON 324 (38 FE) MG Take 1 Tab by mouth 2 times a day, with meals.        Folic Acid (Tab) FOLVITE 1 MG Take 1 Tab by mouth every day.        Gabapentin (Tab) NEURONTIN 600 MG Take 600 mg by mouth 3 times a day.        Indomethacin (Cap) INDOCIN 25 MG 1-2 capsules 30-45 minutes prior to sexual activity to reduce frequency of post-coital headache.        Ketorolac Tromethamine (Solution) TORADOL 30 MG/ML         Magnesium Oxide (Tab) MAG- MG Take 250 mg by mouth every day.        Misc. Devices (Misc) Misc. Devices  Hand/Wrist Separate Finger Orthosis for right hand and left hand        Multiple Vitamins-Minerals   Take 1 Tab by mouth every day.        Omeprazole (CAPSULE DELAYED RELEASE) PRILOSEC 20 MG Take 1 Cap by mouth every day.        Ondansetron (TABLET DISPERSIBLE) ZOFRAN ODT 4 MG Take 1 Tab by mouth every 8 hours as needed for Nausea/Vomiting.        OxyCODONE HCl (Tab)  ROXICODONE 10 MG Take 1 Tab by mouth every four hours as needed for Moderate Pain.        Oyster Shell (Tab) OS-ZAIN 500 500 MG Take 1 Tab by mouth 2 times a day, with meals.        PredniSONE (Tab) DELTASONE 5 MG Take 1 Tab by mouth every day.        RisperiDONE (Tab) RISPERDAL 0.5 MG Take 1 Tab by mouth 2 Times a Day.        Valerian (Cap) Valerian Root 500 MG Take 500 mg by mouth every bedtime.        .                 Medicines prescribed today were sent to:     [facility] - [place], [place] - [address]    [address] [place] [address]    Phone: [phone] Fax: [phone]    Open 24 Hours?: No      Medication refill instructions:       If your prescription bottle indicates you have medication refills left, it is not necessary to call your provider’s office. Please contact your pharmacy and they will refill your medication.    If your prescription bottle indicates you do not have any refills left, you may request refills at any time through one of the following ways: The online [employer] system (except Urgent Care), by calling your provider’s office, or by asking your pharmacy to contact your provider’s office with a refill request. Medication refills are processed only during regular business hours and may not be available until the next business day. Your provider may request additional information or to have a follow-up visit with you prior to refilling your medication.   *Please Note: Medication refills are assigned a new Rx number when refilled electronically. Your pharmacy may indicate that no refills were authorized even though a new prescription for the same medication is available at the pharmacy. Please request the medicine by name with the pharmacy before contacting your provider for a refill.           [employer] Access Code: Activation code not generated  Current [employer] Status: Active

## 2017-08-17 ENCOUNTER — APPOINTMENT (OUTPATIENT)
Dept: NEUROLOGY | Facility: MEDICAL CENTER | Age: 47
End: 2017-08-17
Payer: MEDICARE

## 2017-08-29 ENCOUNTER — OFFICE VISIT (OUTPATIENT)
Dept: RHEUMATOLOGY | Facility: PHYSICIAN GROUP | Age: 47
End: 2017-08-29
Payer: MEDICARE

## 2017-08-29 VITALS
BODY MASS INDEX: 30.17 KG/M2 | SYSTOLIC BLOOD PRESSURE: 112 MMHG | DIASTOLIC BLOOD PRESSURE: 70 MMHG | TEMPERATURE: 97 F | WEIGHT: 165 LBS | HEART RATE: 124 BPM | RESPIRATION RATE: 12 BRPM | OXYGEN SATURATION: 98 %

## 2017-08-29 DIAGNOSIS — M19.90 INFLAMMATORY ARTHROPATHY: ICD-10-CM

## 2017-08-29 DIAGNOSIS — M25.449 SWELLING OF JOINT OF HAND, UNSPECIFIED LATERALITY: ICD-10-CM

## 2017-08-29 PROCEDURE — 99214 OFFICE O/P EST MOD 30 MIN: CPT | Performed by: INTERNAL MEDICINE

## 2017-08-29 ASSESSMENT — ENCOUNTER SYMPTOMS
CHILLS: 0
COUGH: 0
HEMOPTYSIS: 0

## 2017-08-29 NOTE — PROGRESS NOTES
Subjective:   Date of Consultation:8/29/2017  2:40 PM  Primary care physician:Elliott Power M.D.    Reason for Consultation:  Ms. Pang  is a pleasant 46 y.o. year old female who presented with follow-up seronegative Rheumatoid arthritis    Seronegative Rheumatoid Arthritis, non erosive  At last visit she had a hospitalization with elevated LFT and so we stopped her sulfasalazine and methotrexate.   Since her biopsy she has been off prednisone and had hand swelling that is chronic.  Her wrist is sore.  She notes pain across the toes and pain in the right hand is worst.    Bruise.  Yesterday she noted she knocked her forearm and noted she reports over the last few days she has accrued pain in the thighs.    Migraines  Plan for botox  And also seeing pain management    Since last visit she was admitted for infected  shunt.  Her liver enzymes were noted to be elevated.  She is following with GI and will have a liver biopsy.  As we have tapered her steroid she reports that at home she started to develop swelling in the hands and that there were mornings she had severe swelling and was unable to close her hands.    She had an EMG in January which she reports was normal.  She denies any further episodes of epistaxis.  She has not followed up with ENT.      Medications Compliance / Problems: compliant; she does note hair loss. She is not sure if it is related to methotrexate but is noting more hair loss.    Current Medications:  Sulfasalazine 1000 mg BID (off)  Methotrexate 5 tabs weekly q Thursday (off)  Folic acid  Prednisone 5 mg po q day    RHEUM HISTORY:  Ms. Enedelia Pang presented in January 2016 with joint pain and swelling of hands,knees, and weakness and a 3 month history of septal perforation.  ENT initial evaluation noted no disease activity.  She also has a history of abnormal LFT with unclear etiology.  She has had a work-up with GI in early 2017 which was unrevealing.  Also she has had a  recurrent rash described as papules that ulcerate identified as MRSA infections.   Her labs did show thrombocytosis which could be inflammatory and anemia which was determined to be iron deficiency anemia.  Her rheumatologic work-up showed RF(-), MG (-), ANCA (-), SCL 70 (-) and anti centromere (-)  Parvovirus and rubella IgM was negative    Hand xrays did show periarticular osteopenia.    1/9/2017  Hepatitis B surface antigen negative  Hepatitis B core antibody negative  Hepatitis C antibody negative  quantiferon negative   HIV negative     CXR 12/6/2017 was negative for edema     11/2016 Nasal biopsy did not show active disease    Epistaxis with nasal perforation  Stable  No new episodes    Elevated liver enzymes  We have stopped methotrexate due to elevated LFT.    She had a liver biopsy 7/24/2017 that showed primarily benign liver tisuse with multiple scattered small foci of nonspecific hepatocytolysis in the lobules as well as mild nonsepcific chronic inflammation but no fibrosis or cirrhosis.  It also noted that there were liver changes 2/2 methotrexate.    Chronic diarrhea  Had a capsule endoscopy results are pending    Pseudotumor cerebri  She is being followed by Dr. Berry and Erkia Reece and had an injection on 8/16/2017    Iron deficiency anemia  Anemia resolved    Renal cysts  Bilateral  Plan to see nephrology    History of retinal disease  She did see ophthalmology     Past Medical History:  Pseudotumor cerebri, MRSA infection history, CVA, miraines, pituatary tumor, migraine headaches, septal perforation. No history of blood clots    Past Medical History:   Diagnosis Date   • Allergy, unspecified not elsewhere classified    • Anxiety    • autoimmune    • Depression    • H/O Clostridium difficile infection    • Hx MRSA infection    • Hydrocephalus     with lumbar shunt   • Migraine with aura, with intractable migraine, so stated, without mention of status migrainosus    • Pituitary abnormality  "(CMS-HCC)    • Stroke (CMS-HCC)     2007     Past Surgical History:   Procedure Laterality Date   •  SHUNT INSERTION  5/31/2017    Procedure:  SHUNT INSERTION;  Surgeon: Drew Berry M.D.;  Location: SURGERY [facility];  Service:    • SPINAL CORD STIMULATOR  12/19/2016    Procedure: SPINAL CORD STIMULATOR FOR: PLACEMENT OF LEFT FLANK OCCIPITAL NERVE STIMULATOR BATTERY PACK;  Surgeon: Oli Oh III, M.D.;  Location: SURGERY [facility];  Service:    • LUMBAR EXPLORATION N/A 8/31/2015    Procedure: LUMBAR EXPLORATION- Lumbar shunt removal ;  Surgeon: Oli Oh III, M.D.;  Location: SURGERY [facility];  Service:    • IRRIGATION & DEBRIDEMENT NEURO N/A 6/28/2015    Procedure: IRRIGATION & DEBRIDEMENT NEURO;  Surgeon: Oli Oh III, M.D.;  Location: SURGERY [facility];  Service:    • APPENDECTOMY     • CHOLECYSTECTOMY     • OTHER      right knee surgery   • OTHER NEUROLOGICAL SURG      occipital nerve stimulator   • TONSILLECTOMY     • TUBAL LIGATION       Allergies   Allergen Reactions   • Prochlorperazine Swelling     Tongue swelling. Reaction as a teen. (compazine)  Tolerated Phenergan on 02/24/15   • Sumatriptan Unspecified     Historical=\"Heart stops.\" Reaction  between 1995 to 1997   • Vancomycin Shortness of Breath and Rash     Reaction in 2005.  D/W patient 8/31/15 - tolerated loading dose of vancomycin administered on 8/30/15 with some itching to chest with decreased infusion rate. Red Man Syndrome     Outpatient Encounter Prescriptions as of 8/29/2017   Medication Sig Dispense Refill   • ketorolac (TORADOL) 30 MG/ML Solution      • ondansetron (ZOFRAN ODT) 4 MG TABLET DISPERSIBLE Take 1 Tab by mouth every 8 hours as needed for Nausea/Vomiting. 30 Tab 11   • indomethacin (INDOCIN) 25 MG Cap 1-2 capsules 30-45 minutes prior to sexual activity to reduce frequency of post-coital headache. 30 Cap 11   • Valerian Root 500 MG Cap Take 500 mg by mouth every bedtime.     • " risperidone (RISPERDAL) 0.5 MG Tab Take 1 Tab by mouth 2 Times a Day. 60 Tab 3   • ferrous gluconate (FERGON) 324 (38 FE) MG Tab Take 1 Tab by mouth 2 times a day, with meals. 60 Tab 1   • calcium carbonate (CALCIUM CARBONATE) 500 MG Tab Take 1 Tab by mouth 2 times a day, with meals. 90 Tab 1   • omeprazole (PRILOSEC) 20 MG delayed-release capsule Take 1 Cap by mouth every day. 30 Cap 2   • gabapentin (NEURONTIN) 600 MG tablet Take 600 mg by mouth 3 times a day.     • oxycodone immediate release (ROXICODONE) 10 MG immediate release tablet Take 1 Tab by mouth every four hours as needed for Moderate Pain. 100 Tab 0   • Multiple Vitamins-Minerals (MULTIVITAMIN ADULT PO) Take 1 Tab by mouth every day.     • duloxetine (CYMBALTA) 60 MG CPEP delayed-release capsule Take 60 mg by mouth 2 times a day.     • aspirin EC (ECOTRIN) 81 MG Tablet Delayed Response Take 81 mg by mouth every day.     • diphenhydrAMINE (BENADRYL) 50 MG/ML Solution 1 mL by Intramuscular route as needed. 25 Syringe 11   • Misc. Devices Misc Hand/Wrist Separate Finger Orthosis for right hand and left hand 2 Each 1   • [DISCONTINUED] predniSONE (DELTASONE) 5 MG Tab Take 1 Tab by mouth every day. 30 Tab 1   • magnesium oxide (MAG-OX) 400 MG Tab Take 250 mg by mouth every day.     • folic acid (FOLVITE) 1 MG Tab Take 1 Tab by mouth every day. 30 Tab 11   • acetaZOLAMIDE SR (DIAMOX) 500 MG CAPSULE SR 12 HR Take 500 mg by mouth 2 times a day.       No facility-administered encounter medications on file as of 8/29/2017.        Social History     Social History   • Marital status:      Spouse name: N/A   • Number of children: N/A   • Years of education: N/A     Occupational History   • Not on file.     Social History Main Topics   • Smoking status: Never Smoker   • Smokeless tobacco: Never Used   • Alcohol use No   • Drug use: No   • Sexual activity: Not on file     Other Topics Concern   • Not on file     Social History Narrative   • No narrative on  file      History   Smoking Status   • Never Smoker   Smokeless Tobacco   • Never Used     History   Alcohol Use No     History   Drug Use No      OB History   No data available      Patient's last menstrual period was 07/03/2017.    [facility]     No family history on file.    Review of Systems   Constitutional: Negative for chills.   Respiratory: Negative for cough and hemoptysis.    Musculoskeletal: Positive for joint pain.        Hand swelling and joint pain   Skin: Negative for rash.             Neurological: Positive for headaches.        Objective:   /70   Pulse (!) 124   Temp 36.1 °C (97 °F)   Resp 12   Wt 74.8 kg (165 lb)   LMP 07/03/2017   SpO2 98%   Breastfeeding? No   BMI 30.17 kg/m²     Physical Exam   Constitutional: She is oriented to person, place, and time. She appears well-developed and well-nourished. No distress.   HENT:   Head: Normocephalic and atraumatic.   Right Ear: External ear normal.   Left Ear: External ear normal.   Eyes: Conjunctivae are normal. Pupils are equal, round, and reactive to light. Right eye exhibits no discharge. Left eye exhibits no discharge. No scleral icterus.   Cardiovascular: Normal rate, regular rhythm and normal heart sounds.    Pulmonary/Chest: Effort normal. No stridor. No respiratory distress. She has no wheezes.   Abdominal:   Could not appreciate hepatomegaly   Musculoskeletal: She exhibits no edema.   Good ROM of her wrists bilateral flexion and extension.   She notes tenderness on palpation of the right wrist.  The right hand MCP shows trace swelling in the MCP and PIP.  There is overall mild swelling in the hand.  Good ROM flexion and extension of elbows bilateral. Able to make a .   No knee effusion bilateral   Neurological: She is alert and oriented to person, place, and time.   Skin: Skin is warm and dry. She is not diaphoretic.   No alopecia.  No bruises appreciated on the thighs as patient's  reports. On the forearm (right) is a  large bruise just distal to the lateral aspect of the elbow with erythema but also darker discoloration.     Psychiatric: She has a normal mood and affect. Her behavior is normal. Judgment and thought content normal.       Assessment:     1. Inflammatory arthropathy  CT-WRIST WITH & W/O RIGHT    CT-HAND WITH & W/O RIGHT    COMP METABOLIC PANEL    ANTICARDIOLIPIN AB IGG,IGM,IGA    BETA-2 GLYCOPROTEIN I AB,G,A,M    LUPUS ANTICOAGULANT    HLA-B27    URIC ACID   2. Swelling of joint of hand, unspecified laterality  CT-WRIST WITH & W/O RIGHT    CT-HAND WITH & W/O RIGHT    COMP METABOLIC PANEL    ANTICARDIOLIPIN AB IGG,IGM,IGA    BETA-2 GLYCOPROTEIN I AB,G,A,M    LUPUS ANTICOAGULANT    HLA-B27    URIC ACID     Labs:    Lab Results   Component Value Date/Time    QNTTBGOLD Negative 01/09/2017 06:01 PM     Lab Results   Component Value Date/Time    HEPBCORIGM Negative 05/21/2017 02:07 AM    HEPBSAG Negative 05/21/2017 02:07 AM     Lab Results   Component Value Date/Time    HEPCAB Negative 05/21/2017 02:07 AM     Lab Results   Component Value Date/Time    SODIUM 137 06/03/2017 02:48 AM    POTASSIUM 3.6 06/03/2017 02:48 AM    CHLORIDE 109 06/03/2017 02:48 AM    CO2 21 06/03/2017 02:48 AM    GLUCOSE 89 06/03/2017 02:48 AM    BUN 13 06/03/2017 02:48 AM    CREATININE 0.52 06/03/2017 02:48 AM      Lab Results   Component Value Date/Time    WBC 8.8 06/03/2017 02:48 AM    RBC 3.81 (L) 06/03/2017 02:48 AM    HEMOGLOBIN 12.5 06/03/2017 02:48 AM    HEMATOCRIT 38.5 06/03/2017 02:48 AM    .0 (H) 06/03/2017 02:48 AM    MCH 32.8 06/03/2017 02:48 AM    MCHC 32.5 (L) 06/03/2017 02:48 AM    MPV 9.5 06/03/2017 02:48 AM    NEUTSPOLYS 66.40 06/03/2017 02:48 AM    LYMPHOCYTES 24.30 06/03/2017 02:48 AM    MONOCYTES 7.50 06/03/2017 02:48 AM    EOSINOPHILS 0.20 06/03/2017 02:48 AM    BASOPHILS 0.80 06/03/2017 02:48 AM    HYPOCHROMIA 1+ 01/11/2017 02:29 PM    ANISOCYTOSIS 1+ 01/11/2017 02:29 PM      Lab Results   Component Value Date/Time     CALCIUM 9.2 06/03/2017 02:48 AM    ASTSGOT 75 (H) 06/03/2017 02:48 AM    ALTSGPT 345 (H) 06/03/2017 02:48 AM    ALKPHOSPHAT 195 (H) 06/03/2017 02:48 AM    TBILIRUBIN 0.3 06/03/2017 02:48 AM    ALBUMIN 3.8 06/03/2017 02:48 AM    TOTPROTEIN 6.3 06/03/2017 02:48 AM     Lab Results   Component Value Date/Time    RHEUMFACTN <10 04/17/2017 12:20 PM    ANTINUCAB None Detected 01/06/2017 04:12 AM     Lab Results   Component Value Date/Time    SEDRATEWES 19 05/21/2017 02:07 AM    CREACTPROT 8.74 (H) 05/21/2017 02:07 AM     No results found for: EMMANUEL DRVVTINTERP  Lab Results   Component Value Date/Time    H2IBAFLACSJ 178.0 01/06/2017 04:12 AM    S9QIBSEDULA 37.0 01/06/2017 04:12 AM    CRYOGLOBULIN NEG 72Hour 04/17/2017 03:53 PM     Lab Results   Component Value Date/Time    BXFMPMJ16 0 01/06/2017 04:12 AM    CENTROMBAB 2 01/06/2017 04:12 AM     Lab Results   Component Value Date/Time    ANCAIGG <1:20 01/06/2017 04:12 AM    X0QZCKLNHHH 178.0 01/06/2017 04:12 AM     Lab Results   Component Value Date/Time    COLORURINE Lt. Yellow 05/20/2017 09:37 PM    SPECGRAVITY 1.012 05/31/2017 07:19 AM    PHURINE 8.0 05/20/2017 09:37 PM    GLUCOSEUR Negative 05/20/2017 09:37 PM    KETONES Negative 05/20/2017 09:37 PM    PROTEINURIN Negative 05/20/2017 09:37 PM     No results found for: TOTPROTUR, TOTALVOLUME, PXVNFOXB54  No results found for: SSA60, SSA52  Lab Results   Component Value Date/Time    HBA1C 5.2 04/29/2017 02:09 PM     Lab Results   Component Value Date/Time    CPKTOTAL 40 05/21/2017 02:07 AM     Lab Results   Component Value Date/Time    G6PD 20.4 (H) 01/10/2017 01:54 PM     No results found for: LGAA68MPDQ  No results found for: ACESERUM  Lab Results   Component Value Date/Time    25HYDROXY 45 01/09/2017 06:00 PM     No results found for: TSH, FREEDIR  Lab Results   Component Value Date/Time    TSHULTRASEN 6.860 (H) 05/21/2017 02:07 AM    FREET4 0.76 01/05/2017 11:22 PM     No results found for: MICROSOMALA,  ANTITHYROGL  No results found for: IGGLYMEABS  No results found for: ANTIMITOCHO, FACTIN  No results found for: IGA, TTRANSIGA, ENDOIGA  No results found for: FLTYPE, CRYSTALSBDF  No results found for: ISTATICAL  No results found for: ISTATCREAT  No results found for: CTELOPEP  No results found for: GBMABG  No results found for: PTHINTACT  1/27/2015 hepatitis E IgM    1/27/2015 ceruloplasmin = 29.7  1/27/2015 smith antibody = 9 (normal)  1/27/2015 MG negative  1/27/2015 alpha antitrypsin 119 ()  1/27/2015 ferritin = 75 (normal)  1/27/2015 GGT = 327 (0-55)    1/27/2015 IgG 4 = 11  10/31/2016 and 1/27/2015  p ANCA (-), c ANCA (-), PR3 (-)  10/31/2016 MPO (-)  12/7/2014 AST = 23 ALT = 137 Alk phos = 205  6/10/2016 ALT = 921 AST = 821 Alk phos = 537  Labs from JUne 2016 shows low albumin 2.7 and AST 92 and ALT of 370    6/11/2016; AST=92, ALT  = 370 Alk Phos = 331 Hgb = 8.6 MCV = 74.7 t bili = 1.2  10/15/2015: Hgb = 9.1 Plt =   ALT= 357 AST =127 Alk phos = 469 creatinine = 0.7    US LE venous doppler bilateral 6/30/2014  Probable baker's cyst on left lower extremity    Left hand XR 8/14/2014  Soft tissue swelling over the dorsum.  No erorsons no fractures     CT abdomen/Pelvis w/o Contrast 9/4/2014  Small amount of fluid in cul-de-sac no abnormalities in liver.  Abdominal aorta was normal.  Adrenal glands normal.  coritical medullatry calcifications noted of left kidney    CT head w w/o contrast 10/15/2016  No acute abnormalities and on CT maxillofacial w/ contrast - no evidence of sinusitis    CTchest w/con 1/12/2016  No findings to suggest aortic dissection or pulmonary embolus  Left renal 18 mm cyst in the ventral cortex medially    CT spine lumbar w/o contrast  8/30/2015  Small fluid  Collection posterior junction of the epidural catheter seen right lateral to the L2 posterior spinous process consistent with leakage and/or infection      Blood work performed at outside labs dated May 6, 2017 white blood cell  count of 7 total protein is 7.8 albumin is 4.1 monocytes 1010.7 neutrophils were normal at 5.33 and lymphocytes were normal at 9 point correction 0.94 platelets were 233 HEENT hemoglobin  Labs from June 22, 2017 showed a hemoglobin of 14.3 AST was normalized at 27 ALT was elevated at 71 and phosphatase was normal at 180 transferrin saturation was normal at 15% creatinine was normal at 0.6  Transglutaminase IgA antibody was negative on June 22, 2017  Creatinine IgA antibody was pending. On June 22, 2017 endomysial IgA antibody was negative on June 22, 2017  White blood cell was 8.6 platelets were 423 on June 22, 2017  IgG total was 813 which is normal on June 22, 2017 IgA total was 130 which is normal and June 22, 2017  Ceruloplasmin was normal at 35.2 on June 22, 2017 and alpha-1 antitrypsin in the serum was 142 which is normal on June 22, 2017    TTG antibody was negative on June 22, 2017  IgG subclass  IgG 2 subclass was normal at 311, IgG4 subclass was normal at 8, IgG1 subclass was normal at 676 and IgG 3 subclass was slightly elevated at 122 (15-1 02)  Ferritin was normal at 43 on June 22, 2017 creatinine IgG antibody was negative on June 22, 2017. On May 6, 2017 AST was 31 and ALT was normal at 54    Medical Decision Making:  Today's Assessment / Status / Plan:     Seronegative rheumatoid arthritis with involvement of the small joints of the hands and wrists and periarticular osteopenia on xray.  I would like to obtain CT scan of the hand and wrist (right ) since we cannot use an MRI to evaluate for synovitis.  Her challenge with her treatment. She has had recurrent infections and I feel that putting her on a biologic. Very high risk of infection and the type of infection previously has been more regarding her  shunt as well as the stimulator that she has. Alternatively we can't use methotrexate and leflunomide or other medications that may consult the liver as I feel her liver enzymes being elevated may be  secondary not specifically to methotrexate but sensitive to use of hepatotoxic medications. I could consider restarting sulfasalazine however I'm still cautious and would rather evaluate if there truly is active synovitis versus soft tissue swelling in the hand. As part of the workup I'm going to check a uric acid as well as an HLA-B27 since her serologies for rheumatoid arthritis are negative.  I discussed with the patient today about long-term use of steroids is not a viable option and she has been off steroids will continue to hold steroids until he obtain the CT.    Septal perforation  ANCA serologies are negative x 3  MG is negative  Biopsy results did not show active disease  CXR did not show hilar adenopathy  Again I have advised her to follow-up with ENT when she has epixtaxis  While we're concerned about ANCA vasculitis I have not had any evidence. Would consider there is any possible infectious etiology.    Bruising  We'll check antiphospholipid antibodies    Abnormal LFT  Elevated.  She has had an extensive work-up.  We will recheck LFTs.     1. Inflammatory arthropathy  CT-WRIST WITH & W/O RIGHT    CT-HAND WITH & W/O RIGHT    COMP METABOLIC PANEL    ANTICARDIOLIPIN AB IGG,IGM,IGA    BETA-2 GLYCOPROTEIN I AB,G,A,M    LUPUS ANTICOAGULANT    HLA-B27    URIC ACID   2. Swelling of joint of hand, unspecified laterality  CT-WRIST WITH & W/O RIGHT    CT-HAND WITH & W/O RIGHT    COMP METABOLIC PANEL    ANTICARDIOLIPIN AB IGG,IGM,IGA    BETA-2 GLYCOPROTEIN I AB,G,A,M    LUPUS ANTICOAGULANT    HLA-B27    URIC ACID         Return in about 10 weeks (around 11/7/2017).      She agreed and verbalized her agreement and understanding with the current plan. I answered all questions and concerns she has at this time and advised her to call at any time in there interim with questions or concerns in regards to her care.    Thank you for allowing me to participate in her care, I will continue to follow closely.

## 2017-09-14 ENCOUNTER — TELEPHONE (OUTPATIENT)
Dept: RHEUMATOLOGY | Facility: PHYSICIAN GROUP | Age: 47
End: 2017-09-14

## 2017-09-15 ENCOUNTER — OFFICE VISIT (OUTPATIENT)
Dept: RHEUMATOLOGY | Facility: PHYSICIAN GROUP | Age: 47
End: 2017-09-15
Payer: MEDICARE

## 2017-09-15 ENCOUNTER — HOSPITAL ENCOUNTER (OUTPATIENT)
Dept: LAB | Facility: MEDICAL CENTER | Age: 47
End: 2017-09-15
Attending: RADIOLOGY
Payer: MEDICARE

## 2017-09-15 VITALS
BODY MASS INDEX: 30.72 KG/M2 | DIASTOLIC BLOOD PRESSURE: 90 MMHG | RESPIRATION RATE: 12 BRPM | OXYGEN SATURATION: 98 % | WEIGHT: 168 LBS | TEMPERATURE: 98.2 F | SYSTOLIC BLOOD PRESSURE: 120 MMHG | HEART RATE: 82 BPM

## 2017-09-15 DIAGNOSIS — L98.9 SKIN LESION: ICD-10-CM

## 2017-09-15 DIAGNOSIS — J34.89 NASAL SEPTAL PERFORATION: ICD-10-CM

## 2017-09-15 DIAGNOSIS — M25.449 SWELLING OF JOINT OF HAND, UNSPECIFIED LATERALITY: ICD-10-CM

## 2017-09-15 DIAGNOSIS — R79.89 LFT ELEVATION: ICD-10-CM

## 2017-09-15 DIAGNOSIS — G43.919 INTRACTABLE MIGRAINE, UNSPECIFIED MIGRAINE TYPE: ICD-10-CM

## 2017-09-15 LAB — GFR SERPL CREATININE-BSD FRML MDRD: >60 ML/MIN/1.73 M 2

## 2017-09-15 PROCEDURE — 84550 ASSAY OF BLOOD/URIC ACID: CPT

## 2017-09-15 PROCEDURE — 85610 PROTHROMBIN TIME: CPT

## 2017-09-15 PROCEDURE — 86635 COCCIDIOIDES ANTIBODY: CPT | Mod: 91

## 2017-09-15 PROCEDURE — 86146 BETA-2 GLYCOPROTEIN ANTIBODY: CPT

## 2017-09-15 PROCEDURE — 86147 CARDIOLIPIN ANTIBODY EA IG: CPT | Mod: 91

## 2017-09-15 PROCEDURE — 85613 RUSSELL VIPER VENOM DILUTED: CPT

## 2017-09-15 PROCEDURE — 86215 DEOXYRIBONUCLEASE ANTIBODY: CPT

## 2017-09-15 PROCEDURE — 36415 COLL VENOUS BLD VENIPUNCTURE: CPT

## 2017-09-15 PROCEDURE — 85730 THROMBOPLASTIN TIME PARTIAL: CPT | Mod: GZ

## 2017-09-15 PROCEDURE — 80307 DRUG TEST PRSMV CHEM ANLYZR: CPT | Mod: GZ

## 2017-09-15 PROCEDURE — 99214 OFFICE O/P EST MOD 30 MIN: CPT | Performed by: INTERNAL MEDICINE

## 2017-09-15 PROCEDURE — 80053 COMPREHEN METABOLIC PANEL: CPT

## 2017-09-15 PROCEDURE — 86060 ANTISTREPTOLYSIN O TITER: CPT

## 2017-09-15 ASSESSMENT — ENCOUNTER SYMPTOMS
COUGH: 0
ABDOMINAL PAIN: 0
FEVER: 0
HEMOPTYSIS: 0
CHILLS: 0

## 2017-09-15 NOTE — PROGRESS NOTES
Subjective:   Date of Consultation:9/15/2017  2:28 PM  Primary care physician:Elliott Power M.D.    Reason for Consultation:  Ms. Pang  is a pleasant 46 y.o. year old female who presented with follow-up seronegative Rheumatoid arthritis    Seronegative Rheumatoid Arthritis, non erosive  She is off prednisone and notes intermittent pain and swelling.  She notes swelling at the [facility] on the right hand.    Bruise and skin lesions  She has a previous history of recurrent skin lesions.  She presents with lesions on her lower extremity (right) that started as a red macule that then blisters up then pops and will scab over.  She notes a small clear lesion on her right hand with papule and mild swelling on the thenar region.    Migraines  Had first botox injection and next is Nov 18.  No relief thus far.  SHe also has planned occipital block.  And also seeing pain management  She has had a  shunt placed and this has been complicated with infections.  Her liver enzymes were noted to be elevated.  She is following with GI and will have a liver biopsy.  As we have tapered her steroid she reports that at home she started to develop swelling in the hands and that there were mornings she had severe swelling and was unable to close her hands.    She had an EMG in January which she reports was normal.  She denies any further episodes of epistaxis.  She has not followed up with ENT.      Medications Compliance / Problems: compliant; she does note hair loss. She is not sure if it is related to methotrexate but is noting more hair loss.    Current Medications:  Sulfasalazine 1000 mg BID (off)  Methotrexate 5 tabs weekly q Thursday (off)  Folic acid  Prednisone 5 mg po q day (off)    RHEUM HISTORY:  Ms. Enedelia Pang presented in January 2016 with joint pain and swelling of hands,knees, and weakness and a 3 month history of septal perforation.  ENT initial evaluation noted no disease activity.  She also has a history of  abnormal LFT with unclear etiology.  She has had a work-up with GI in early 2017 which was unrevealing.  Also she has had a recurrent rash described as papules that ulcerate identified as MRSA infections.   Her labs did show thrombocytosis which could be inflammatory and anemia which was determined to be iron deficiency anemia.  Her rheumatologic work-up showed RF(-), MG (-), ANCA (-), SCL 70 (-) and anti centromere (-)  Parvovirus and rubella IgM was negative    Hand xrays did show periarticular osteopenia.    1/9/2017  Hepatitis B surface antigen negative  Hepatitis B core antibody negative  Hepatitis C antibody negative  quantiferon negative   HIV negative     CXR 12/6/2017 was negative for edema     11/2016 Nasal biopsy did not show active disease    Epistaxis with nasal perforation  Stable.  occasional nosebleeds but it is minor.  Since her recent hospitalization she has not had an episode of epistaxis.    Elevated liver enzymes  We have stopped methotrexate due to elevated LFT.    She had a liver biopsy 7/24/2017 that showed primarily benign liver tisuse with multiple scattered small foci of nonspecific hepatocytolysis in the lobules as well as mild nonsepcific chronic inflammation but no fibrosis or cirrhosis.  It also noted that there were liver changes 2/2 methotrexate.  She has not repeated her labs.    Chronic diarrhea  Had a capsule endoscopy - results are pending.    Pseudotumor cerebri  She is being followed by Dr. Berry and Erika Reece and had an injection on 8/16/2017    Iron deficiency anemia  MCV is elevated which could be secondary to her methotrexate  Her anemia has also resolved.    Renal cysts  Bilateral  Plan to see nephrology    History of retinal disease  She did see ophthalmology     Past Medical History:  Pseudotumor cerebri, MRSA infection history, CVA, miraines, pituatary tumor, migraine headaches, septal perforation. No history of blood clots    Past Medical History:   Diagnosis Date  "  • Allergy, unspecified not elsewhere classified    • Anxiety    • autoimmune    • Depression    • H/O Clostridium difficile infection    • Hx MRSA infection    • Hydrocephalus     with lumbar shunt   • Migraine with aura, with intractable migraine, so stated, without mention of status migrainosus    • Pituitary abnormality (CMS-[facility])    • Stroke (CMS-[facility])     2007     Past Surgical History:   Procedure Laterality Date   •  SHUNT INSERTION  5/31/2017    Procedure:  SHUNT INSERTION;  Surgeon: Drew Berry M.D.;  Location: SURGERY [facility];  Service:    • SPINAL CORD STIMULATOR  12/19/2016    Procedure: SPINAL CORD STIMULATOR FOR: PLACEMENT OF LEFT FLANK OCCIPITAL NERVE STIMULATOR BATTERY PACK;  Surgeon: Oli Oh III, M.D.;  Location: SURGERY [facility];  Service:    • LUMBAR EXPLORATION N/A 8/31/2015    Procedure: LUMBAR EXPLORATION- Lumbar shunt removal ;  Surgeon: Oli Oh III, M.D.;  Location: SURGERY [facility];  Service:    • IRRIGATION & DEBRIDEMENT NEURO N/A 6/28/2015    Procedure: IRRIGATION & DEBRIDEMENT NEURO;  Surgeon: Oli Oh III, M.D.;  Location: SURGERY [facility];  Service:    • APPENDECTOMY     • CHOLECYSTECTOMY     • OTHER      right knee surgery   • OTHER NEUROLOGICAL SURG      occipital nerve stimulator   • TONSILLECTOMY     • TUBAL LIGATION       Allergies   Allergen Reactions   • Prochlorperazine Swelling     Tongue swelling. Reaction as a teen. (compazine)  Tolerated Phenergan on 02/24/15   • Sumatriptan Unspecified     Historical=\"Heart stops.\" Reaction  between 1995 to 1997   • Vancomycin Shortness of Breath and Rash     Reaction in 2005.  D/W patient 8/31/15 - tolerated loading dose of vancomycin administered on 8/30/15 with some itching to chest with decreased infusion rate. Red Man Syndrome     Outpatient Encounter Prescriptions as of 9/15/2017   Medication Sig Dispense Refill   • Valerian Root 500 MG Cap Take 500 mg by mouth every bedtime.   "   • risperidone (RISPERDAL) 0.5 MG Tab Take 1 Tab by mouth 2 Times a Day. 60 Tab 3   • ferrous gluconate (FERGON) 324 (38 FE) MG Tab Take 1 Tab by mouth 2 times a day, with meals. 60 Tab 1   • calcium carbonate (CALCIUM CARBONATE) 500 MG Tab Take 1 Tab by mouth 2 times a day, with meals. 90 Tab 1   • gabapentin (NEURONTIN) 600 MG tablet Take 600 mg by mouth 3 times a day.     • Multiple Vitamins-Minerals (MULTIVITAMIN ADULT PO) Take 1 Tab by mouth every day.     • duloxetine (CYMBALTA) 60 MG CPEP delayed-release capsule Take 60 mg by mouth 2 times a day.     • ketorolac (TORADOL) 30 MG/ML Solution      • aspirin EC (ECOTRIN) 81 MG Tablet Delayed Response Take 81 mg by mouth every day.     • diphenhydrAMINE (BENADRYL) 50 MG/ML Solution 1 mL by Intramuscular route as needed. 25 Syringe 11   • ondansetron (ZOFRAN ODT) 4 MG TABLET DISPERSIBLE Take 1 Tab by mouth every 8 hours as needed for Nausea/Vomiting. 30 Tab 11   • indomethacin (INDOCIN) 25 MG Cap 1-2 capsules 30-45 minutes prior to sexual activity to reduce frequency of post-coital headache. 30 Cap 11   • Misc. Devices Misc Hand/Wrist Separate Finger Orthosis for right hand and left hand 2 Each 1   • magnesium oxide (MAG-OX) 400 MG Tab Take 250 mg by mouth every day.     • folic acid (FOLVITE) 1 MG Tab Take 1 Tab by mouth every day. 30 Tab 11   • omeprazole (PRILOSEC) 20 MG delayed-release capsule Take 1 Cap by mouth every day. 30 Cap 2   • oxycodone immediate release (ROXICODONE) 10 MG immediate release tablet Take 1 Tab by mouth every four hours as needed for Moderate Pain. 100 Tab 0   • acetaZOLAMIDE SR (DIAMOX) 500 MG CAPSULE SR 12 HR Take 500 mg by mouth 2 times a day.       No facility-administered encounter medications on file as of 9/15/2017.        Social History     Social History   • Marital status:      Spouse name: N/A   • Number of children: N/A   • Years of education: N/A     Occupational History   • Not on file.     Social History Main  Topics   • Smoking status: Never Smoker   • Smokeless tobacco: Never Used   • Alcohol use No   • Drug use: No   • Sexual activity: Not on file     Other Topics Concern   • Not on file     Social History Narrative   • No narrative on file      History   Smoking Status   • Never Smoker   Smokeless Tobacco   • Never Used     History   Alcohol Use No     History   Drug Use No      OB History   No data available      Patient's last menstrual period was 09/06/2017.    [facility]     No family history on file.    Review of Systems   Constitutional: Negative for chills and fever.        No further fevers   Respiratory: Negative for cough and hemoptysis.    Gastrointestinal: Negative for abdominal pain.   Musculoskeletal: Positive for joint pain.        Hand swelling and joint pain   Skin: Positive for rash.                  Objective:   /90   Pulse 82   Temp 36.8 °C (98.2 °F)   Resp 12   Wt 76.2 kg (168 lb)   LMP 09/06/2017   SpO2 98%   Breastfeeding? No   BMI 30.72 kg/m²     Physical Exam   Constitutional: She is oriented to person, place, and time. She appears well-developed and well-nourished. No distress.   HENT:   Head: Normocephalic and atraumatic.   Right Ear: External ear normal.   Left Ear: External ear normal.   Eyes: Conjunctivae are normal. Pupils are equal, round, and reactive to light. Right eye exhibits no discharge. Left eye exhibits no discharge. No scleral icterus.   Pulmonary/Chest: Effort normal. No stridor. No respiratory distress.   Musculoskeletal: She exhibits no edema.   Mild swelling of the right wrist but no tenderness and good ROM.  MTP squeeze testing bilateral R>>>L.    Good ROM of her wrists bilateral flexion and extension.   No periarticular swelling of the MCP, PIP. Good ROM flexion and extension of elbows bilateral. Able to make a .      Lymphadenopathy:     She has no cervical adenopathy.   Neurological: She is alert and oriented to person, place, and time.   Skin: Skin is  warm and dry. She is not diaphoretic.   No alopecia.  No bruises appreciated on the thighs as patient's  reports. On the thenar eminence are lesions.   On the calf there is a skin ulcerations with surrounding erythema.   Psychiatric: She has a normal mood and affect. Her behavior is normal. Judgment and thought content normal.       Assessment:     1. Swelling of joint of hand, unspecified laterality  REFERRAL TO DERMATOLOGY    COMP METABOLIC PANEL    URIC ACID    BETA-2 GLYCOPROTEIN I AB,G,A,M    ANTICARDIOLIPIN AB IGG,IGM,IGA    LUPUS ANTICOAGULANT    COCCI AB PANEL, SERUM    ANTISTREPTOLYSIN O    ANTI-DNASE B AB    CANCELED: URINE DRUG SCREEN   2. Nasal septal perforation  REFERRAL TO DERMATOLOGY    URINE DRUG SCREEN    CANCELED: URINE DRUG SCREEN   3. LFT elevation  COMP METABOLIC PANEL    URINE DRUG SCREEN   4. Intractable migraine, unspecified migraine type     5. Skin lesion  REFERRAL TO DERMATOLOGY    URINE DRUG SCREEN     Labs:    Lab Results   Component Value Date/Time    QNTTBGOLD Negative 01/09/2017 06:01 PM     Lab Results   Component Value Date/Time    HEPBCORIGM Negative 05/21/2017 02:07 AM    HEPBSAG Negative 05/21/2017 02:07 AM     Lab Results   Component Value Date/Time    HEPCAB Negative 05/21/2017 02:07 AM     Lab Results   Component Value Date/Time    SODIUM 137 06/03/2017 02:48 AM    POTASSIUM 3.6 06/03/2017 02:48 AM    CHLORIDE 109 06/03/2017 02:48 AM    CO2 21 06/03/2017 02:48 AM    GLUCOSE 89 06/03/2017 02:48 AM    BUN 13 06/03/2017 02:48 AM    CREATININE 0.52 06/03/2017 02:48 AM      Lab Results   Component Value Date/Time    WBC 8.8 06/03/2017 02:48 AM    RBC 3.81 (L) 06/03/2017 02:48 AM    HEMOGLOBIN 12.5 06/03/2017 02:48 AM    HEMATOCRIT 38.5 06/03/2017 02:48 AM    .0 (H) 06/03/2017 02:48 AM    MCH 32.8 06/03/2017 02:48 AM    MCHC 32.5 (L) 06/03/2017 02:48 AM    MPV 9.5 06/03/2017 02:48 AM    NEUTSPOLYS 66.40 06/03/2017 02:48 AM    LYMPHOCYTES 24.30 06/03/2017 02:48 AM     MONOCYTES 7.50 06/03/2017 02:48 AM    EOSINOPHILS 0.20 06/03/2017 02:48 AM    BASOPHILS 0.80 06/03/2017 02:48 AM    HYPOCHROMIA 1+ 01/11/2017 02:29 PM    ANISOCYTOSIS 1+ 01/11/2017 02:29 PM      Lab Results   Component Value Date/Time    CALCIUM 9.2 06/03/2017 02:48 AM    ASTSGOT 75 (H) 06/03/2017 02:48 AM    ALTSGPT 345 (H) 06/03/2017 02:48 AM    ALKPHOSPHAT 195 (H) 06/03/2017 02:48 AM    TBILIRUBIN 0.3 06/03/2017 02:48 AM    ALBUMIN 3.8 06/03/2017 02:48 AM    TOTPROTEIN 6.3 06/03/2017 02:48 AM     Lab Results   Component Value Date/Time    RHEUMFACTN <10 04/17/2017 12:20 PM    ANTINUCAB None Detected 01/06/2017 04:12 AM     Lab Results   Component Value Date/Time    SEDRATEWES 19 05/21/2017 02:07 AM    CREACTPROT 8.74 (H) 05/21/2017 02:07 AM     No results found for: RUSSELVIPER, DRVVTINTERP  Lab Results   Component Value Date/Time    S8DJJAGCPGA 178.0 01/06/2017 04:12 AM    S3ERIUBPJIU 37.0 01/06/2017 04:12 AM    CRYOGLOBULIN NEG 72Hour 04/17/2017 03:53 PM     Lab Results   Component Value Date/Time    NWCODHY25 0 01/06/2017 04:12 AM    CENTROMBAB 2 01/06/2017 04:12 AM     Lab Results   Component Value Date/Time    ANCAIGG <1:20 01/06/2017 04:12 AM    N0YVZCGMABP 178.0 01/06/2017 04:12 AM     Lab Results   Component Value Date/Time    COLORURINE Lt. Yellow 05/20/2017 09:37 PM    SPECGRAVITY 1.012 05/31/2017 07:19 AM    PHURINE 8.0 05/20/2017 09:37 PM    GLUCOSEUR Negative 05/20/2017 09:37 PM    KETONES Negative 05/20/2017 09:37 PM    PROTEINURIN Negative 05/20/2017 09:37 PM     No results found for: TOTPROTUR, TOTALVOLUME, JNGWIAEM35  No results found for: SSA60, SSA52  Lab Results   Component Value Date/Time    HBA1C 5.2 04/29/2017 02:09 PM     Lab Results   Component Value Date/Time    CPKTOTAL 40 05/21/2017 02:07 AM     Lab Results   Component Value Date/Time    G6PD 20.4 (H) 01/10/2017 01:54 PM     No results found for: VDPA28FVAS  No results found for: ACESERUM  Lab Results   Component Value Date/Time     25HYDROXY 45 01/09/2017 06:00 PM     No results found for: TSH, FREEDIR  Lab Results   Component Value Date/Time    TSHULTRASEN 6.860 (H) 05/21/2017 02:07 AM    FREET4 0.76 01/05/2017 11:22 PM     No results found for: MICROSOMALA, ANTITHYROGL  No results found for: IGGLYMEABS  No results found for: ANTIMITOCHO, FACTIN  No results found for: IGA, TTRANSIGA, ENDOIGA  No results found for: FLTYPE, CRYSTALSBDF  No results found for: ISTATICAL  No results found for: ISTATCREAT  No results found for: CTELOPEP  No results found for: GBMABG  No results found for: PTHINTACT  1/27/2015 hepatitis E IgM    1/27/2015 ceruloplasmin = 29.7  1/27/2015 smith antibody = 9 (normal)  1/27/2015 MG negative  1/27/2015 alpha antitrypsin 119 ()  1/27/2015 ferritin = 75 (normal)  1/27/2015 GGT = 327 (0-55)    1/27/2015 IgG 4 = 11  10/31/2016 and 1/27/2015  p ANCA (-), c ANCA (-), PR3 (-)  10/31/2016 MPO (-)  12/7/2014 AST = 23 ALT = 137 Alk phos = 205  6/10/2016 ALT = 921 AST = 821 Alk phos = 537  Labs from JUne 2016 shows low albumin 2.7 and AST 92 and ALT of 370    6/11/2016; AST=92, ALT  = 370 Alk Phos = 331 Hgb = 8.6 MCV = 74.7 t bili = 1.2  10/15/2015: Hgb = 9.1 Plt =   ALT= 357 AST =127 Alk phos = 469 creatinine = 0.7    US LE venous doppler bilateral 6/30/2014  Probable baker's cyst on left lower extremity    Left hand XR 8/14/2014  Soft tissue swelling over the dorsum.  No erorsons no fractures     CT abdomen/Pelvis w/o Contrast 9/4/2014  Small amount of fluid in cul-de-sac no abnormalities in liver.  Abdominal aorta was normal.  Adrenal glands normal.  coritical medullatry calcifications noted of left kidney    CT head w w/o contrast 10/15/2016  No acute abnormalities and on CT maxillofacial w/ contrast - no evidence of sinusitis    CTchest w/con 1/12/2016  No findings to suggest aortic dissection or pulmonary embolus  Left renal 18 mm cyst in the ventral cortex medially    CT spine lumbar w/o contrast  8/30/2015  Small  fluid  Collection posterior junction of the epidural catheter seen right lateral to the L2 posterior spinous process consistent with leakage and/or infection      Blood work performed at outside labs dated May 6, 2017 white blood cell count of 7 total protein is 7.8 albumin is 4.1 monocytes 1010.7 neutrophils were normal at 5.33 and lymphocytes were normal at 9 point correction 0.94 platelets were 233 HEENT hemoglobin  Labs from June 22, 2017 showed a hemoglobin of 14.3 AST was normalized at 27 ALT was elevated at 71 and phosphatase was normal at 180 transferrin saturation was normal at 15% creatinine was normal at 0.6  Transglutaminase IgA antibody was negative on June 22, 2017  Creatinine IgA antibody was pending. On June 22, 2017 endomysial IgA antibody was negative on June 22, 2017  White blood cell was 8.6 platelets were 423 on June 22, 2017  IgG total was 813 which is normal on June 22, 2017 IgA total was 130 which is normal and June 22, 2017  Ceruloplasmin was normal at 35.2 on June 22, 2017 and alpha-1 antitrypsin in the serum was 142 which is normal on June 22, 2017    TTG antibody was negative on June 22, 2017  IgG subclass  IgG 2 subclass was normal at 311, IgG4 subclass was normal at 8, IgG1 subclass was normal at 676 and IgG 3 subclass was slightly elevated at 122 (15-1 02)  Ferritin was normal at 43 on June 22, 2017 creatinine IgG antibody was negative on June 22, 2017. On May 6, 2017 AST was 31 and ALT was normal at 54    Medical Decision Making:  Today's Assessment / Status / Plan:     Skin Rash  She mainly presents with a return of previous skin lesions.  Will refer to dermatology for consideration of biopsy.  Would be interesting if this could be infectious or vasculitic?  Parvovirus is negative.  Rubella is negative.  Will check for infectious causes including cocciodides. ASO and anti-DNASE    Seronegative rheumatoid arthritis with involvement of the small joints of the hands and wrists and  periarticular osteopenia on xray.  I still want the patient to pursue a CT of the hand and wrist (right).  Will check uric acid.    She has had recurrent infections and I feel that putting her on a biologic. Very high risk of infection and the type of infection previously has been more regarding her  shunt as well as the stimulator that she has. Alternatively we can't use methotrexate and leflunomide or other medications that may consult the liver as I feel her liver enzymes being elevated may be secondary not specifically to methotrexate but sensitive to use of hepatotoxic medications. I could consider restarting sulfasalazine however I'm still cautious and would rather evaluate if there truly is active synovitis versus soft tissue swelling in the hand. As part of the workup I'm going to check a uric acid as well as an HLA-B27 since her serologies for rheumatoid arthritis are negative.  I discussed with the patient today about long-term use of steroids is not a viable option and she has been off steroids will continue to hold steroids until he obtain the CT.    Septal perforation  ANCA serologies are negative x 3  MG Is negative  Biopsy results did not show active disease  CXR did not note hilar adenopathy  She has a follow-up with ENT soon  While we're concerned about ANCA vasculitis I have not had any evidence. Would consider there is any possible infectious etiology - as noted above.  Also noted that she has septal perforation. Today I discussed that we should check a urine drug screen.  She denies drug use and agrees to get the drug screen today.  We are mainly looking for cocaine use as septal perforation can cause a vasculitis.    Bruising  We'll check antiphospholipid antibodies    Abnormal LFT  Elevated.  She has had an extensive work-up.  We will recheck LFTs.     1. Swelling of joint of hand, unspecified laterality  REFERRAL TO DERMATOLOGY    COMP METABOLIC PANEL    URIC ACID    BETA-2 GLYCOPROTEIN I  AB,G,A,M    ANTICARDIOLIPIN AB IGG,IGM,IGA    LUPUS ANTICOAGULANT    COCCI AB PANEL, SERUM    ANTISTREPTOLYSIN O    ANTI-DNASE B AB    CANCELED: URINE DRUG SCREEN   2. Nasal septal perforation  REFERRAL TO DERMATOLOGY    URINE DRUG SCREEN    CANCELED: URINE DRUG SCREEN   3. LFT elevation  COMP METABOLIC PANEL    URINE DRUG SCREEN   4. Intractable migraine, unspecified migraine type     5. Skin lesion  REFERRAL TO DERMATOLOGY    URINE DRUG SCREEN         As scheduled    She agreed and verbalized her agreement and understanding with the current plan. I answered all questions and concerns she has at this time and advised her to call at any time in there interim with questions or concerns in regards to her care.    Thank you for allowing me to participate in her care, I will continue to follow closely.

## 2017-09-15 NOTE — LETTER
[facility]   [address], [address]  [place] [address]  Phone: [phone]  Fax: [phone]              Encounter Date: 9/15/2017    Dear ,      It was a pleasure seeing your patient, Enedelia Pang, on 9/15/2017. Diagnoses of Swelling of joint of hand, unspecified laterality, Nasal septal perforation, LFT elevation, Intractable migraine, unspecified migraine type, and Skin lesion were pertinent to this visit.     Please find attached progress note which includes the history I obtained from Ms. Pang, my physical examination findings, my impression and recommendations.      Once again, it was a pleasure participating in your patient's care.  Please feel free to contact me if you have any questions or if I can be of any further assistance to your patients.      Sincerely,    Aimee Andrade M.D.  Electronically Signed          PROGRESS NOTE:  Subjective:   Date of Consultation:9/15/2017  2:28 PM  Primary care physician:Elliott Power M.D.    Reason for Consultation:  Ms. Pang  is a pleasant 46 y.o. year old female who presented with follow-up seronegative Rheumatoid arthritis    Seronegative Rheumatoid Arthritis, non erosive  She is off prednisone and notes intermittent pain and swelling.  She notes swelling at the [facility] on the right hand.    Bruise and skin lesions  She has a previous history of recurrent skin lesions.  She presents with lesions on her lower extremity (right) that started as a red macule that then blisters up then pops and will scab over.  She notes a small clear lesion on her right hand with papule and mild swelling on the thenar region.    Migraines  Had first botox injection and next is Nov 18.  No relief thus far.  SHe also has planned occipital block.  And also seeing pain management  She has had a  shunt placed and this has been complicated with infections.  Her liver enzymes were noted to be elevated.  She is following with GI and will have a  liver biopsy.  As we have tapered her steroid she reports that at home she started to develop swelling in the hands and that there were mornings she had severe swelling and was unable to close her hands.    She had an EMG in January which she reports was normal.  She denies any further episodes of epistaxis.  She has not followed up with ENT.      Medications Compliance / Problems: compliant; she does note hair loss. She is not sure if it is related to methotrexate but is noting more hair loss.    Current Medications:  Sulfasalazine 1000 mg BID (off)  Methotrexate 5 tabs weekly q Thursday (off)  Folic acid  Prednisone 5 mg po q day (off)    RHEUM HISTORY:  Ms. Enedelia Pang presented in January 2016 with joint pain and swelling of hands,knees, and weakness and a 3 month history of septal perforation.  ENT initial evaluation noted no disease activity.  She also has a history of abnormal LFT with unclear etiology.  She has had a work-up with GI in early 2017 which was unrevealing.  Also she has had a recurrent rash described as papules that ulcerate identified as MRSA infections.   Her labs did show thrombocytosis which could be inflammatory and anemia which was determined to be iron deficiency anemia.  Her rheumatologic work-up showed RF(-), MG (-), ANCA (-), SCL 70 (-) and anti centromere (-)  Parvovirus and rubella IgM was negative    Hand xrays did show periarticular osteopenia.    1/9/2017  Hepatitis B surface antigen negative  Hepatitis B core antibody negative  Hepatitis C antibody negative  quantiferon negative   HIV negative     CXR 12/6/2017 was negative for edema     11/2016 Nasal biopsy did not show active disease    Epistaxis with nasal perforation  Stable.  occasional nosebleeds but it is minor.  Since her recent hospitalization she has not had an episode of epistaxis.    Elevated liver enzymes  We have stopped methotrexate due to elevated LFT.    She had a liver biopsy 7/24/2017 that showed  primarily benign liver tisuse with multiple scattered small foci of nonspecific hepatocytolysis in the lobules as well as mild nonsepcific chronic inflammation but no fibrosis or cirrhosis.  It also noted that there were liver changes 2/2 methotrexate.  She has not repeated her labs.    Chronic diarrhea  Had a capsule endoscopy - results are pending.    Pseudotumor cerebri  She is being followed by Dr. Berry and Erika Reece and had an injection on 8/16/2017    Iron deficiency anemia  MCV is elevated which could be secondary to her methotrexate  Her anemia has also resolved.    Renal cysts  Bilateral  Plan to see nephrology    History of retinal disease  She did see ophthalmology     Past Medical History:  Pseudotumor cerebri, MRSA infection history, CVA, miraines, pituatary tumor, migraine headaches, septal perforation. No history of blood clots    Past Medical History:   Diagnosis Date   • Allergy, unspecified not elsewhere classified    • Anxiety    • autoimmune    • Depression    • H/O Clostridium difficile infection    • Hx MRSA infection    • Hydrocephalus     with lumbar shunt   • Migraine with aura, with intractable migraine, so stated, without mention of status migrainosus    • Pituitary abnormality (CMS-HCC)    • Stroke (CMS-HCC)     2007     Past Surgical History:   Procedure Laterality Date   •  SHUNT INSERTION  5/31/2017    Procedure:  SHUNT INSERTION;  Surgeon: Drew Berry M.D.;  Location: SURGERY [facility];  Service:    • SPINAL CORD STIMULATOR  12/19/2016    Procedure: SPINAL CORD STIMULATOR FOR: PLACEMENT OF LEFT FLANK OCCIPITAL NERVE STIMULATOR BATTERY PACK;  Surgeon: Oli Oh III, M.D.;  Location: SURGERY [facility] [place];  Service:    • LUMBAR EXPLORATION N/A 8/31/2015    Procedure: LUMBAR EXPLORATION- Lumbar shunt removal ;  Surgeon: Oli Oh III, M.D.;  Location: SURGERY [facility] [place];  Service:    • IRRIGATION & DEBRIDEMENT NEURO N/A 6/28/2015    Procedure:  "IRRIGATION & DEBRIDEMENT NEURO;  Surgeon: Oli Oh III, M.D.;  Location: SURGERY [facility];  Service:    • APPENDECTOMY     • CHOLECYSTECTOMY     • OTHER      right knee surgery   • OTHER NEUROLOGICAL SURG      occipital nerve stimulator   • TONSILLECTOMY     • TUBAL LIGATION       Allergies   Allergen Reactions   • Prochlorperazine Swelling     Tongue swelling. Reaction as a teen. (compazine)  Tolerated Phenergan on 02/24/15   • Sumatriptan Unspecified     Historical=\"Heart stops.\" Reaction  between 1995 to 1997   • Vancomycin Shortness of Breath and Rash     Reaction in 2005.  D/W patient 8/31/15 - tolerated loading dose of vancomycin administered on 8/30/15 with some itching to chest with decreased infusion rate. Red Man Syndrome     Outpatient Encounter Prescriptions as of 9/15/2017   Medication Sig Dispense Refill   • Valerian Root 500 MG Cap Take 500 mg by mouth every bedtime.     • risperidone (RISPERDAL) 0.5 MG Tab Take 1 Tab by mouth 2 Times a Day. 60 Tab 3   • ferrous gluconate (FERGON) 324 (38 FE) MG Tab Take 1 Tab by mouth 2 times a day, with meals. 60 Tab 1   • calcium carbonate (CALCIUM CARBONATE) 500 MG Tab Take 1 Tab by mouth 2 times a day, with meals. 90 Tab 1   • gabapentin (NEURONTIN) 600 MG tablet Take 600 mg by mouth 3 times a day.     • Multiple Vitamins-Minerals (MULTIVITAMIN ADULT PO) Take 1 Tab by mouth every day.     • duloxetine (CYMBALTA) 60 MG CPEP delayed-release capsule Take 60 mg by mouth 2 times a day.     • ketorolac (TORADOL) 30 MG/ML Solution      • aspirin EC (ECOTRIN) 81 MG Tablet Delayed Response Take 81 mg by mouth every day.     • diphenhydrAMINE (BENADRYL) 50 MG/ML Solution 1 mL by Intramuscular route as needed. 25 Syringe 11   • ondansetron (ZOFRAN ODT) 4 MG TABLET DISPERSIBLE Take 1 Tab by mouth every 8 hours as needed for Nausea/Vomiting. 30 Tab 11   • indomethacin (INDOCIN) 25 MG Cap 1-2 capsules 30-45 minutes prior to sexual activity to reduce frequency of " post-coital headache. 30 Cap 11   • Misc. Devices Misc Hand/Wrist Separate Finger Orthosis for right hand and left hand 2 Each 1   • magnesium oxide (MAG-OX) 400 MG Tab Take 250 mg by mouth every day.     • folic acid (FOLVITE) 1 MG Tab Take 1 Tab by mouth every day. 30 Tab 11   • omeprazole (PRILOSEC) 20 MG delayed-release capsule Take 1 Cap by mouth every day. 30 Cap 2   • oxycodone immediate release (ROXICODONE) 10 MG immediate release tablet Take 1 Tab by mouth every four hours as needed for Moderate Pain. 100 Tab 0   • acetaZOLAMIDE SR (DIAMOX) 500 MG CAPSULE SR 12 HR Take 500 mg by mouth 2 times a day.       No facility-administered encounter medications on file as of 9/15/2017.        Social History     Social History   • Marital status:      Spouse name: N/A   • Number of children: N/A   • Years of education: N/A     Occupational History   • Not on file.     Social History Main Topics   • Smoking status: Never Smoker   • Smokeless tobacco: Never Used   • Alcohol use No   • Drug use: No   • Sexual activity: Not on file     Other Topics Concern   • Not on file     Social History Narrative   • No narrative on file      History   Smoking Status   • Never Smoker   Smokeless Tobacco   • Never Used     History   Alcohol Use No     History   Drug Use No      OB History   No data available      Patient's last menstrual period was 09/06/2017.    [facility]     No family history on file.    Review of Systems   Constitutional: Negative for chills and fever.        No further fevers   Respiratory: Negative for cough and hemoptysis.    Gastrointestinal: Negative for abdominal pain.   Musculoskeletal: Positive for joint pain.        Hand swelling and joint pain   Skin: Positive for rash.                  Objective:   /90   Pulse 82   Temp 36.8 °C (98.2 °F)   Resp 12   Wt 76.2 kg (168 lb)   LMP 09/06/2017   SpO2 98%   Breastfeeding? No   BMI 30.72 kg/m²      Physical Exam   Constitutional: She is oriented  to person, place, and time. She appears well-developed and well-nourished. No distress.   HENT:   Head: Normocephalic and atraumatic.   Right Ear: External ear normal.   Left Ear: External ear normal.   Eyes: Conjunctivae are normal. Pupils are equal, round, and reactive to light. Right eye exhibits no discharge. Left eye exhibits no discharge. No scleral icterus.   Pulmonary/Chest: Effort normal. No stridor. No respiratory distress.   Musculoskeletal: She exhibits no edema.   Mild swelling of the right wrist but no tenderness and good ROM.  MTP squeeze testing bilateral R>>>L.    Good ROM of her wrists bilateral flexion and extension.   No periarticular swelling of the MCP, PIP. Good ROM flexion and extension of elbows bilateral. Able to make a .      Lymphadenopathy:     She has no cervical adenopathy.   Neurological: She is alert and oriented to person, place, and time.   Skin: Skin is warm and dry. She is not diaphoretic.   No alopecia.  No bruises appreciated on the thighs as patient's  reports. On the thenar eminence are lesions.   On the calf there is a skin ulcerations with surrounding erythema.   Psychiatric: She has a normal mood and affect. Her behavior is normal. Judgment and thought content normal.       Assessment:     1. Swelling of joint of hand, unspecified laterality  REFERRAL TO DERMATOLOGY    COMP METABOLIC PANEL    URIC ACID    BETA-2 GLYCOPROTEIN I AB,G,A,M    ANTICARDIOLIPIN AB IGG,IGM,IGA    LUPUS ANTICOAGULANT    COCCI AB PANEL, SERUM    ANTISTREPTOLYSIN O    ANTI-DNASE B AB    CANCELED: URINE DRUG SCREEN   2. Nasal septal perforation  REFERRAL TO DERMATOLOGY    URINE DRUG SCREEN    CANCELED: URINE DRUG SCREEN   3. LFT elevation  COMP METABOLIC PANEL    URINE DRUG SCREEN   4. Intractable migraine, unspecified migraine type     5. Skin lesion  REFERRAL TO DERMATOLOGY    URINE DRUG SCREEN     Labs:    Lab Results   Component Value Date/Time    QNTTBGOLD Negative 01/09/2017 06:01 PM       Lab Results   Component Value Date/Time    HEPBCORIGM Negative 05/21/2017 02:07 AM    HEPBSAG Negative 05/21/2017 02:07 AM     Lab Results   Component Value Date/Time    HEPCAB Negative 05/21/2017 02:07 AM     Lab Results   Component Value Date/Time    SODIUM 137 06/03/2017 02:48 AM    POTASSIUM 3.6 06/03/2017 02:48 AM    CHLORIDE 109 06/03/2017 02:48 AM    CO2 21 06/03/2017 02:48 AM    GLUCOSE 89 06/03/2017 02:48 AM    BUN 13 06/03/2017 02:48 AM    CREATININE 0.52 06/03/2017 02:48 AM      Lab Results   Component Value Date/Time    WBC 8.8 06/03/2017 02:48 AM    RBC 3.81 (L) 06/03/2017 02:48 AM    HEMOGLOBIN 12.5 06/03/2017 02:48 AM    HEMATOCRIT 38.5 06/03/2017 02:48 AM    .0 (H) 06/03/2017 02:48 AM    MCH 32.8 06/03/2017 02:48 AM    MCHC 32.5 (L) 06/03/2017 02:48 AM    MPV 9.5 06/03/2017 02:48 AM    NEUTSPOLYS 66.40 06/03/2017 02:48 AM    LYMPHOCYTES 24.30 06/03/2017 02:48 AM    MONOCYTES 7.50 06/03/2017 02:48 AM    EOSINOPHILS 0.20 06/03/2017 02:48 AM    BASOPHILS 0.80 06/03/2017 02:48 AM    HYPOCHROMIA 1+ 01/11/2017 02:29 PM    ANISOCYTOSIS 1+ 01/11/2017 02:29 PM      Lab Results   Component Value Date/Time    CALCIUM 9.2 06/03/2017 02:48 AM    ASTSGOT 75 (H) 06/03/2017 02:48 AM    ALTSGPT 345 (H) 06/03/2017 02:48 AM    ALKPHOSPHAT 195 (H) 06/03/2017 02:48 AM    TBILIRUBIN 0.3 06/03/2017 02:48 AM    ALBUMIN 3.8 06/03/2017 02:48 AM    TOTPROTEIN 6.3 06/03/2017 02:48 AM     Lab Results   Component Value Date/Time    RHEUMFACTN <10 04/17/2017 12:20 PM    ANTINUCAB None Detected 01/06/2017 04:12 AM     Lab Results   Component Value Date/Time    SEDRATEWES 19 05/21/2017 02:07 AM    CREACTPROT 8.74 (H) 05/21/2017 02:07 AM     No results found for: LETA BENITEZ  Lab Results   Component Value Date/Time    Z0TBCNWFZRJ 178.0 01/06/2017 04:12 AM    C7HPXVTOVBF 37.0 01/06/2017 04:12 AM    CRYOGLOBULIN NEG 72Hour 04/17/2017 03:53 PM     Lab Results   Component Value Date/Time    QBISJNG52 0 01/06/2017  04:12 AM    CENTROMBAB 2 01/06/2017 04:12 AM     Lab Results   Component Value Date/Time    ANCAIGG <1:20 01/06/2017 04:12 AM    S1XFHWJBJSR 178.0 01/06/2017 04:12 AM     Lab Results   Component Value Date/Time    COLORURINE Lt. Yellow 05/20/2017 09:37 PM    SPECGRAVITY 1.012 05/31/2017 07:19 AM    PHURINE 8.0 05/20/2017 09:37 PM    GLUCOSEUR Negative 05/20/2017 09:37 PM    KETONES Negative 05/20/2017 09:37 PM    PROTEINURIN Negative 05/20/2017 09:37 PM     No results found for: TOTPROTUR, TOTALVOLUME, QWHESKQU14  No results found for: SSA60, SSA52  Lab Results   Component Value Date/Time    HBA1C 5.2 04/29/2017 02:09 PM     Lab Results   Component Value Date/Time    CPKTOTAL 40 05/21/2017 02:07 AM     Lab Results   Component Value Date/Time    G6PD 20.4 (H) 01/10/2017 01:54 PM     No results found for: BIYM76RBBE  No results found for: ACESERUM  Lab Results   Component Value Date/Time    25HYDROXY 45 01/09/2017 06:00 PM     No results found for: TSH, FREEDIR  Lab Results   Component Value Date/Time    TSHULTRASEN 6.860 (H) 05/21/2017 02:07 AM    FREET4 0.76 01/05/2017 11:22 PM     No results found for: MICROSOMALA, ANTITHYROGL  No results found for: IGGLYMEABS  No results found for: ANTIMITOCHO, FACTIN  No results found for: IGA, TTRANSIGA, ENDOIGA  No results found for: FLTYPE, CRYSTALSBDF  No results found for: ISTATICAL  No results found for: ISTATCREAT  No results found for: CTELOPEP  No results found for: GBMABG  No results found for: PTHINTACT  1/27/2015 hepatitis E IgM    1/27/2015 ceruloplasmin = 29.7  1/27/2015 smith antibody = 9 (normal)  1/27/2015 MG negative  1/27/2015 alpha antitrypsin 119 ()  1/27/2015 ferritin = 75 (normal)  1/27/2015 GGT = 327 (0-55)    1/27/2015 IgG 4 = 11  10/31/2016 and 1/27/2015  p ANCA (-), c ANCA (-), PR3 (-)  10/31/2016 MPO (-)  12/7/2014 AST = 23 ALT = 137 Alk phos = 205  6/10/2016 ALT = 921 AST = 821 Alk phos = 537  Labs from JUne 2016 shows low albumin 2.7 and AST  92 and ALT of 370    6/11/2016; AST=92, ALT  = 370 Alk Phos = 331 Hgb = 8.6 MCV = 74.7 t bili = 1.2  10/15/2015: Hgb = 9.1 Plt =   ALT= 357 AST =127 Alk phos = 469 creatinine = 0.7    US LE venous doppler bilateral 6/30/2014  Probable baker's cyst on left lower extremity    Left hand XR 8/14/2014  Soft tissue swelling over the dorsum.  No erorsons no fractures     CT abdomen/Pelvis w/o Contrast 9/4/2014  Small amount of fluid in cul-de-sac no abnormalities in liver.  Abdominal aorta was normal.  Adrenal glands normal.  coritical medullatry calcifications noted of left kidney    CT head w w/o contrast 10/15/2016  No acute abnormalities and on CT maxillofacial w/ contrast - no evidence of sinusitis    CTchest w/con 1/12/2016  No findings to suggest aortic dissection or pulmonary embolus  Left renal 18 mm cyst in the ventral cortex medially    CT spine lumbar w/o contrast  8/30/2015  Small fluid  Collection posterior junction of the epidural catheter seen right lateral to the L2 posterior spinous process consistent with leakage and/or infection      Blood work performed at outside labs dated May 6, 2017 white blood cell count of 7 total protein is 7.8 albumin is 4.1 monocytes 1010.7 neutrophils were normal at 5.33 and lymphocytes were normal at 9 point correction 0.94 platelets were 233 HEENT hemoglobin  Labs from June 22, 2017 showed a hemoglobin of 14.3 AST was normalized at 27 ALT was elevated at 71 and phosphatase was normal at 180 transferrin saturation was normal at 15% creatinine was normal at 0.6  Transglutaminase IgA antibody was negative on June 22, 2017  Creatinine IgA antibody was pending. On June 22, 2017 endomysial IgA antibody was negative on June 22, 2017  White blood cell was 8.6 platelets were 423 on June 22, 2017  IgG total was 813 which is normal on June 22, 2017 IgA total was 130 which is normal and June 22, 2017  Ceruloplasmin was normal at 35.2 on June 22, 2017 and alpha-1 antitrypsin in the  serum was 142 which is normal on June 22, 2017    TTG antibody was negative on June 22, 2017  IgG subclass  IgG 2 subclass was normal at 311, IgG4 subclass was normal at 8, IgG1 subclass was normal at 676 and IgG 3 subclass was slightly elevated at 122 (15-1 02)  Ferritin was normal at 43 on June 22, 2017 creatinine IgG antibody was negative on June 22, 2017. On May 6, 2017 AST was 31 and ALT was normal at 54    Medical Decision Making:  Today's Assessment / Status / Plan:     Skin Rash  She mainly presents with a return of previous skin lesions.  Will refer to dermatology for consideration of biopsy.  Would be interesting if this could be infectious or vasculitic?  Parvovirus is negative.  Rubella is negative.  Will check for infectious causes including cocciodides. ASO and anti-DNASE    Seronegative rheumatoid arthritis with involvement of the small joints of the hands and wrists and periarticular osteopenia on xray.  I still want the patient to pursue a CT of the hand and wrist (right).  Will check uric acid.    She has had recurrent infections and I feel that putting her on a biologic. Very high risk of infection and the type of infection previously has been more regarding her  shunt as well as the stimulator that she has. Alternatively we can't use methotrexate and leflunomide or other medications that may consult the liver as I feel her liver enzymes being elevated may be secondary not specifically to methotrexate but sensitive to use of hepatotoxic medications. I could consider restarting sulfasalazine however I'm still cautious and would rather evaluate if there truly is active synovitis versus soft tissue swelling in the hand. As part of the workup I'm going to check a uric acid as well as an HLA-B27 since her serologies for rheumatoid arthritis are negative.  I discussed with the patient today about long-term use of steroids is not a viable option and she has been off steroids will continue to hold  steroids until he obtain the CT.    Septal perforation  ANCA serologies are negative x 3  MG Is negative  Biopsy results did not show active disease  CXR did not note hilar adenopathy  She has a follow-up with ENT soon  While we're concerned about ANCA vasculitis I have not had any evidence. Would consider there is any possible infectious etiology - as noted above.  Also noted that she has septal perforation. Today I discussed that we should check a urine drug screen.  She denies drug use and agrees to get the drug screen today.  We are mainly looking for cocaine use as septal perforation can cause a vasculitis.    Bruising  We'll check antiphospholipid antibodies    Abnormal LFT  Elevated.  She has had an extensive work-up.  We will recheck LFTs.     1. Swelling of joint of hand, unspecified laterality  REFERRAL TO DERMATOLOGY    COMP METABOLIC PANEL    URIC ACID    BETA-2 GLYCOPROTEIN I AB,G,A,M    ANTICARDIOLIPIN AB IGG,IGM,IGA    LUPUS ANTICOAGULANT    COCCI AB PANEL, SERUM    ANTISTREPTOLYSIN O    ANTI-DNASE B AB    CANCELED: URINE DRUG SCREEN   2. Nasal septal perforation  REFERRAL TO DERMATOLOGY    URINE DRUG SCREEN    CANCELED: URINE DRUG SCREEN   3. LFT elevation  COMP METABOLIC PANEL    URINE DRUG SCREEN   4. Intractable migraine, unspecified migraine type     5. Skin lesion  REFERRAL TO DERMATOLOGY    URINE DRUG SCREEN         As scheduled    She agreed and verbalized her agreement and understanding with the current plan. I answered all questions and concerns she has at this time and advised her to call at any time in there interim with questions or concerns in regards to her care.    Thank you for allowing me to participate in her care, I will continue to follow closely.

## 2017-09-15 NOTE — TELEPHONE ENCOUNTER
Pt. Called in today stating that she has started to have lesions on her body, and that you wanted to see them. Scheduled pt. In your 2:30 slot tomorrow 9/15/17. Thank you!

## 2017-09-16 LAB
ALBUMIN SERPL BCP-MCNC: 4.2 G/DL (ref 3.2–4.9)
ALBUMIN/GLOB SERPL: 1.5 G/DL
ALP SERPL-CCNC: 93 U/L (ref 30–99)
ALT SERPL-CCNC: 34 U/L (ref 2–50)
ANION GAP SERPL CALC-SCNC: 9 MMOL/L (ref 0–11.9)
APTT PPP: 29.1 SEC (ref 24.7–36)
AST SERPL-CCNC: 20 U/L (ref 12–45)
BILIRUB SERPL-MCNC: 0.5 MG/DL (ref 0.1–1.5)
BUN SERPL-MCNC: 12 MG/DL (ref 8–22)
CALCIUM SERPL-MCNC: 9.2 MG/DL (ref 8.5–10.5)
CHLORIDE SERPL-SCNC: 100 MMOL/L (ref 96–112)
CO2 SERPL-SCNC: 22 MMOL/L (ref 20–33)
CREAT SERPL-MCNC: 0.47 MG/DL (ref 0.5–1.4)
GLOBULIN SER CALC-MCNC: 2.8 G/DL (ref 1.9–3.5)
GLUCOSE SERPL-MCNC: 89 MG/DL (ref 65–99)
INR PPP: 0.94 (ref 0.87–1.13)
LA PPP-IMP: NORMAL
POTASSIUM SERPL-SCNC: 3.6 MMOL/L (ref 3.6–5.5)
PROT SERPL-MCNC: 7 G/DL (ref 6–8.2)
PROTHROMBIN TIME: 12.9 SEC (ref 12–14.6)
SCREEN DRVVT: 40.3 SEC (ref 28–48)
SODIUM SERPL-SCNC: 131 MMOL/L (ref 135–145)
URATE SERPL-MCNC: 4.3 MG/DL (ref 1.9–8.2)

## 2017-09-17 LAB
AMPHETAMINES UR QL: NEGATIVE NG/ML
ASO AB SERPL-ACNC: 353 IU/ML (ref 0–330)
B2 GLYCOPROT1 IGA SER-ACNC: 0 SAU (ref 0–20)
B2 GLYCOPROT1 IGG SERPL IA-ACNC: 0 SGU (ref 0–20)
B2 GLYCOPROT1 IGM SERPL IA-ACNC: 2 SMU (ref 0–20)
BARBITURATES UR QL: NEGATIVE NG/ML
BENZODIAZ UR QL: NEGATIVE NG/ML
CANNABINOIDS UR QL SCN: NEGATIVE NG/ML
COCAINE UR QL: NEGATIVE NG/ML
DRUG SCREEN COMMENT UR-IMP: NORMAL
MDMA CTO UR SCN-MCNC: NEGATIVE NG/ML
METHADONE UR QL: NEGATIVE NG/ML
OPIATES UR QL: NEGATIVE NG/ML
OXYCODONE CTO UR SCN-MCNC: NEGATIVE NG/ML
PCP UR QL SCN: NEGATIVE NG/ML
PROPOXYPH UR QL: NEGATIVE NG/ML
STREP DNASE B TITR SER: 285 U/ML (ref 0–260)

## 2017-09-18 LAB
CARDIOLIPIN IGA SER IA-ACNC: 2 APL (ref 0–11)
CARDIOLIPIN IGG SER IA-ACNC: 5 GPL (ref 0–14)
CARDIOLIPIN IGM SER IA-ACNC: 6 MPL (ref 0–12)

## 2017-09-20 LAB
C IMMITIS IGM SPEC QL IA: 0.4 IV
COCCIDIOIDES AB SPEC QL ID: NORMAL
COCCIDIOIDES AB TITR SER CF: NORMAL {TITER}
COCCIDIOIDES IGG SPEC QL IA: 0.5 IV

## 2017-09-21 ENCOUNTER — TELEPHONE (OUTPATIENT)
Dept: RHEUMATOLOGY | Facility: PHYSICIAN GROUP | Age: 47
End: 2017-09-21

## 2017-09-21 DIAGNOSIS — I00: ICD-10-CM

## 2017-09-21 NOTE — TELEPHONE ENCOUNTER
Please call patient and note that her labs did show a recent history of streptococcus infection.  I have made a referral to Infectious Disease.  I have also sent her a [employer] Message

## 2017-09-28 ENCOUNTER — TELEPHONE (OUTPATIENT)
Dept: RHEUMATOLOGY | Facility: PHYSICIAN GROUP | Age: 47
End: 2017-09-28

## 2017-09-29 NOTE — TELEPHONE ENCOUNTER
----- Message from Aimee Andrade M.D. sent at 9/26/2017 12:02 PM PDT -----   If you haven't called this patient about this could you?  Thanks.    Aimee  ----- Message -----  From: Aimee Andrade M.D.  Sent: 9/25/2017   5:01 AM  To: Enedelia Pang,    I just wanted let you know that there were some positive antibodies on your bloodwork.  It suggests an recent strep infection.  I have asked for a referral to infectious disease.  With your history of arthritis, fevers I would ask if this could be more of a systemic condition.      Please let me know if you have any questions.    Aimee Andrade DO

## 2017-10-02 ENCOUNTER — HOSPITAL ENCOUNTER (OUTPATIENT)
Facility: MEDICAL CENTER | Age: 47
End: 2017-10-02
Attending: SPECIALIST | Admitting: SPECIALIST
Payer: MEDICARE

## 2017-10-04 ENCOUNTER — APPOINTMENT (RX ONLY)
Dept: URBAN - METROPOLITAN AREA CLINIC 20 | Facility: CLINIC | Age: 47
Setting detail: DERMATOLOGY
End: 2017-10-04

## 2017-10-04 DIAGNOSIS — L90.5 SCAR CONDITIONS AND FIBROSIS OF SKIN: ICD-10-CM

## 2017-10-04 DIAGNOSIS — R60.0 LOCALIZED EDEMA: ICD-10-CM

## 2017-10-04 DIAGNOSIS — L98419 CHRONIC ULCER OF OTHER SPECIFIED SITES: ICD-10-CM

## 2017-10-04 DIAGNOSIS — L98429 CHRONIC ULCER OF OTHER SPECIFIED SITES: ICD-10-CM

## 2017-10-04 PROBLEM — K21.9 GASTRO-ESOPHAGEAL REFLUX DISEASE WITHOUT ESOPHAGITIS: Status: ACTIVE | Noted: 2017-10-04

## 2017-10-04 PROBLEM — L98.499 NON-PRESSURE CHRONIC ULCER OF SKIN OF OTHER SITES WITH UNSPECIFIED SEVERITY: Status: ACTIVE | Noted: 2017-10-04

## 2017-10-04 PROBLEM — L97.819 NON-PRESSURE CHRONIC ULCER OF OTHER PART OF RIGHT LOWER LEG WITH UNSPECIFIED SEVERITY: Status: ACTIVE | Noted: 2017-10-04

## 2017-10-04 PROBLEM — F32.9 MAJOR DEPRESSIVE DISORDER, SINGLE EPISODE, UNSPECIFIED: Status: ACTIVE | Noted: 2017-10-04

## 2017-10-04 PROCEDURE — ? COUNSELING

## 2017-10-04 PROCEDURE — ? ORDER TESTS

## 2017-10-04 PROCEDURE — ? BIOPSY BY PUNCH METHOD

## 2017-10-04 PROCEDURE — 11100: CPT

## 2017-10-04 PROCEDURE — 11101: CPT

## 2017-10-04 PROCEDURE — 99202 OFFICE O/P NEW SF 15 MIN: CPT | Mod: 25

## 2017-10-04 ASSESSMENT — LOCATION ZONE DERM
LOCATION ZONE: ARM
LOCATION ZONE: HAND
LOCATION ZONE: LEG
LOCATION ZONE: HAND

## 2017-10-04 ASSESSMENT — LOCATION SIMPLE DESCRIPTION DERM
LOCATION SIMPLE: RIGHT FOREARM
LOCATION SIMPLE: LEFT HAND
LOCATION SIMPLE: RIGHT PRETIBIAL REGION
LOCATION SIMPLE: RIGHT HAND
LOCATION SIMPLE: RIGHT HAND
LOCATION SIMPLE: LEFT FOREARM
LOCATION SIMPLE: RIGHT ANKLE
LOCATION SIMPLE: LEFT ANKLE

## 2017-10-04 ASSESSMENT — LOCATION DETAILED DESCRIPTION DERM
LOCATION DETAILED: RIGHT ULNAR DORSAL HAND
LOCATION DETAILED: LEFT ANKLE
LOCATION DETAILED: RIGHT DISTAL PRETIBIAL REGION
LOCATION DETAILED: RIGHT ANKLE
LOCATION DETAILED: RIGHT PROXIMAL DORSAL FOREARM
LOCATION DETAILED: RIGHT MEDIAL DISTAL PRETIBIAL REGION
LOCATION DETAILED: RIGHT RADIAL DORSAL HAND
LOCATION DETAILED: RIGHT THENAR EMINENCE
LOCATION DETAILED: LEFT RADIAL DORSAL HAND
LOCATION DETAILED: RIGHT PROXIMAL PRETIBIAL REGION
LOCATION DETAILED: LEFT PROXIMAL DORSAL FOREARM

## 2017-10-04 NOTE — PROCEDURE: COUNSELING
Detail Level: Generalized
Patient Specific Counseling (Will Not Stick From Patient To Patient): Will do a biopsy today to see what the actual diagnosis is (bullous impetigo versus bullous pemphigoid or other blistering disorder)

## 2017-10-04 NOTE — PROCEDURE: BIOPSY BY PUNCH METHOD
Detail Level: Detailed
Punch Size In Mm: 4
Body Location Override (Optional - Billing Will Still Be Based On Selected Body Map Location If Applicable): R dorsal hand lesional
Biopsy Type: H and E
Anesthesia Type: 1% lidocaine with 1:100,000 epinephrine and 408mcg clindamycin/ml and a 1:10 solution of 8.4% sodium bicarbonate
Anesthesia Volume In Cc: 0.5
Additional Anesthesia Volume In Cc (Will Not Render If 0): 0
Hemostasis: Electrocautery
Epidermal Sutures: 5-0 Monosof
Wound Care: Petrolatum
Dressing: bandage
Suture Removal: 14 days
Patient Will Remove Sutures At Home?: No
Lab: [id number]
X Size Of Lesion In Cm (Optional): 0.6
Size Of Lesion In Cm (Optional): 0.9
Lab Facility: 
Consent: Written consent was obtained and risks were reviewed including but not limited to scarring, infection, bleeding, scabbing, incomplete removal, nerve damage and allergy to anesthesia.
Render Post-Care Instructions In Note?: yes
Post-Care Instructions: I reviewed with the patient in detail post-care instructions. Patient is to keep the biopsy site dry overnight, and then apply Vaseline twice daily until healed.
Notification Instructions: Patient will be notified of biopsy results. However, patient instructed to call the office if not contacted within 2 weeks.
Billing Type: Third-Party Bill
Biopsy Type: DIF (perilesional)
Body Location Override (Optional - Billing Will Still Be Based On Selected Body Map Location If Applicable): R dorsal hand

## 2017-10-04 NOTE — HPI: SKIN LESIONS
Is This A New Presentation, Or A Follow-Up?: Skin Lesions
How Severe Is Your Skin Lesion?: moderate
Have Your Skin Lesions Been Treated?: not been treated
Additional History: Pt has been seeing Rheum for an autoimmune workup for her swelling, GI and liver issues.  She was sent here by Rheum.   She has has lesions like this before in 2014 and they begin as red marks, and progress to blisters and then to ulcers; have been treated in the past at wound care.  She has a Hx of MRSA and these spots itch.

## 2017-10-05 ENCOUNTER — APPOINTMENT (OUTPATIENT)
Dept: ADMISSIONS | Facility: MEDICAL CENTER | Age: 47
End: 2017-10-05
Attending: SPECIALIST
Payer: MEDICARE

## 2017-10-05 ENCOUNTER — OFFICE VISIT (OUTPATIENT)
Dept: INFECTIOUS DISEASES | Facility: MEDICAL CENTER | Age: 47
End: 2017-10-05
Payer: MEDICARE

## 2017-10-05 VITALS
WEIGHT: 165 LBS | TEMPERATURE: 97.9 F | DIASTOLIC BLOOD PRESSURE: 86 MMHG | OXYGEN SATURATION: 90 % | SYSTOLIC BLOOD PRESSURE: 122 MMHG | HEART RATE: 114 BPM | RESPIRATION RATE: 16 BRPM | HEIGHT: 62 IN | BODY MASS INDEX: 30.36 KG/M2

## 2017-10-05 VITALS — HEIGHT: 63 IN | BODY MASS INDEX: 29.57 KG/M2 | WEIGHT: 166.89 LBS

## 2017-10-05 DIAGNOSIS — M06.9 RHEUMATOID ARTHRITIS, INVOLVING UNSPECIFIED SITE, UNSPECIFIED RHEUMATOID FACTOR PRESENCE: Chronic | ICD-10-CM

## 2017-10-05 DIAGNOSIS — Z01.812 PRE-PROCEDURAL LABORATORY EXAMINATION: ICD-10-CM

## 2017-10-05 DIAGNOSIS — R23.8 BULLAE: ICD-10-CM

## 2017-10-05 DIAGNOSIS — A49.1 STREPTOCOCCAL INFECTION: ICD-10-CM

## 2017-10-05 LAB
ERYTHROCYTE [DISTWIDTH] IN BLOOD BY AUTOMATED COUNT: 48.2 FL (ref 35.9–50)
HCT VFR BLD AUTO: 44.4 % (ref 37–47)
HGB BLD-MCNC: 14 G/DL (ref 12–16)
MCH RBC QN AUTO: 31.5 PG (ref 27–33)
MCHC RBC AUTO-ENTMCNC: 31.5 G/DL (ref 33.6–35)
MCV RBC AUTO: 99.8 FL (ref 81.4–97.8)
PLATELET # BLD AUTO: 332 K/UL (ref 164–446)
PMV BLD AUTO: 9.7 FL (ref 9–12.9)
RBC # BLD AUTO: 4.45 M/UL (ref 4.2–5.4)
WBC # BLD AUTO: 6 K/UL (ref 4.8–10.8)

## 2017-10-05 PROCEDURE — 36415 COLL VENOUS BLD VENIPUNCTURE: CPT

## 2017-10-05 PROCEDURE — 99214 OFFICE O/P EST MOD 30 MIN: CPT | Performed by: INTERNAL MEDICINE

## 2017-10-05 PROCEDURE — 85027 COMPLETE CBC AUTOMATED: CPT

## 2017-10-05 RX ORDER — KETOROLAC TROMETHAMINE 30 MG/ML
30 INJECTION, SOLUTION INTRAMUSCULAR; INTRAVENOUS
COMMUNITY
End: 2017-10-10 | Stop reason: HOSPADM

## 2017-10-05 RX ORDER — KETOROLAC TROMETHAMINE 30 MG/ML
30 INJECTION, SOLUTION INTRAMUSCULAR; INTRAVENOUS
COMMUNITY
End: 2017-10-05

## 2017-10-05 NOTE — PROGRESS NOTES
Chief Complaint   Patient presents with   • Follow-Up     infection of polyarthritis       HISTORY OF PRESENT ILLNESS: Patient is a 47 y.o. female established patient who presents today for elevated ASO titer. Known to ID from prior port infection in 2015 Port since removed. +chronobacter and MSSA    Since January 2016 + joint pain and swelling of hands,knees, and weakness and septal perforation.  ENT initial evaluation noted no disease activity.  No h/o illicit drug use.  She also has a history of abnormal LFT with unclear etiology.  Work-up with GI in early 2017 which was unrevealing.   She has had a recurrent rash described as papules/bullae that ulcerate. Sometimes are associated with surrounding halo or erythema. S/p biopsy-results are pending  Hep C neg. HIV neg  h/o MRSA infections.     Her rheumatologic work-up showed RF(-), MG (-), ANCA (-), SCL 70 (-) and anti centromere (-)  Parvovirus and rubella IgM was negative     Pictures reviewed-skin lesions bullous then ulcerate. Sometimes associated with erythema.  Sometimes antecedent ecchymosis or halo  Has  shunt    Patient Active Problem List    Diagnosis Date Noted   • Status migrainosus 05/21/2017     Priority: High   • Suicidal ideation 04/30/2017     Priority: High   • Chest pain 04/29/2017     Priority: High   • Swelling of joint of hand 01/06/2017     Priority: High   • Septic shock (CMS-[facility]) 08/31/2015     Priority: High   • Back pain 06/28/2015     Priority: High   • R Hemiparesis 10/29/2014     Priority: High   • Whole body pain 05/22/2017     Priority: Medium   • Nasal congestion 05/22/2017     Priority: Medium   • Diarrhea 05/21/2017     Priority: Medium   • LFT elevation 05/21/2017     Priority: Medium   • Anxiety 06/29/2015     Priority: Medium   • Intractable migraine 03/21/2015     Priority: Medium   • Osteomyelitis of finger of left hand (CMS-HCC) 10/30/2014     Priority: Medium   • Complicated migraine 10/29/2014     Priority: Medium   •  Intestinal infection due to Clostridium difficile 10/10/2014     Priority: Medium   • Chronic pain syndrome 12/15/2016     Priority: Low   • Chronic migraine 07/13/2017   • Nausea and vomiting 05/26/2017   • Positive blood cultures 05/26/2017   • Rheumatoid arthritis(714.0) 05/22/2017   • Hyperlipidemia 05/01/2017   • Metabolic acidosis 05/01/2017   • Rheumatoid arthritis (CMS-HCC) 05/01/2017   • H/O: CVA (cerebrovascular accident) 04/30/2017   • Seronegative rheumatoid arthritis (CMS-HCC) 01/17/2017   • Chronic pain disorder 12/19/2016   • Migraine 10/04/2015   • Chronic pain 10/04/2015   • Bacteremia 10/04/2015   • Infected venous access port 10/03/2015   • Normocytic anemia 08/31/2015   • Thrombocytopenia (CMS-HCC) 08/31/2015   • Hypokalemia 08/30/2015   • Lactic acidosis 08/30/2015   • Hypomagnesemia 08/30/2015   • Leukocytosis 08/30/2015   • Sepsis (CMS-HCC) 08/30/2015   • Intractable low back pain 08/30/2015   • Hypocalcemia 08/30/2015   • Headache 07/03/2015   • Abdominal pain 07/03/2015   • S/P  shunt 06/29/2015   • Depression 06/29/2015   • SIRS (systemic inflammatory response syndrome) (CMS-HCC) 06/28/2015   • Wounds, multiple 04/23/2015   • Hydrocephalus 04/23/2015   • Dermatitis- chronic 03/24/2015   • Low blood pressure reading 03/22/2015   • Elevated ALT 11/04/2014   • Paresthesia of right arm 10/09/2014       Allergies:Prochlorperazine; Sumatriptan; and Vancomycin    Current Outpatient Prescriptions   Medication Sig Dispense Refill   • ketorolac (TORADOL) 30 MG/ML Solution      • ondansetron (ZOFRAN ODT) 4 MG TABLET DISPERSIBLE Take 1 Tab by mouth every 8 hours as needed for Nausea/Vomiting. 30 Tab 11   • indomethacin (INDOCIN) 25 MG Cap 1-2 capsules 30-45 minutes prior to sexual activity to reduce frequency of post-coital headache. 30 Cap 11   • Valerian Root 500 MG Cap Take 500 mg by mouth every bedtime.     • risperidone (RISPERDAL) 0.5 MG Tab Take 1 Tab by mouth 2 Times a Day. 60 Tab 3   •  "ferrous gluconate (FERGON) 324 (38 FE) MG Tab Take 1 Tab by mouth 2 times a day, with meals. 60 Tab 1   • calcium carbonate (CALCIUM CARBONATE) 500 MG Tab Take 1 Tab by mouth 2 times a day, with meals. 90 Tab 1   • omeprazole (PRILOSEC) 20 MG delayed-release capsule Take 1 Cap by mouth every day. 30 Cap 2   • gabapentin (NEURONTIN) 600 MG tablet Take 600 mg by mouth 3 times a day.     • oxycodone immediate release (ROXICODONE) 10 MG immediate release tablet Take 1 Tab by mouth every four hours as needed for Moderate Pain. 100 Tab 0   • Multiple Vitamins-Minerals (MULTIVITAMIN ADULT PO) Take 1 Tab by mouth every day.     • duloxetine (CYMBALTA) 60 MG CPEP delayed-release capsule Take 60 mg by mouth 2 times a day.     • aspirin EC (ECOTRIN) 81 MG Tablet Delayed Response Take 81 mg by mouth every day.     • diphenhydrAMINE (BENADRYL) 50 MG/ML Solution 1 mL by Intramuscular route as needed. 25 Syringe 11   • Misc. Devices Misc Hand/Wrist Separate Finger Orthosis for right hand and left hand 2 Each 1   • magnesium oxide (MAG-OX) 400 MG Tab Take 250 mg by mouth every day.     • folic acid (FOLVITE) 1 MG Tab Take 1 Tab by mouth every day. 30 Tab 11   • acetaZOLAMIDE SR (DIAMOX) 500 MG CAPSULE SR 12 HR Take 500 mg by mouth 2 times a day.       No current facility-administered medications for this visit.        Social History   Substance Use Topics   • Smoking status: Never Smoker   • Smokeless tobacco: Never Used   • Alcohol use Yes      Comment: Rare       ROS:  Review of Systems   Constitutional: Negative for fever, chills, weight loss and malaise/fatigue.   HENT: Negative for ear pain, nosebleeds, congestion, sore throat and neck pain.      All other systems reviewed and are negative except as in HPI.      Exam:  Blood pressure 122/86, pulse (!) 114, temperature 36.6 °C (97.9 °F), resp. rate 16, height 1.575 m (5' 2.01\"), weight 74.8 kg (165 lb), last menstrual period 10/02/2017, SpO2 90 %.  General:  Well nourished, " well developed female in NAD. Pleasant and cooperative  Head: NCAT, Pupils are equal round reactive to light. Extraocular movements intact. Oropharynx is clear.  Neck: Supple.  No LAD  Pulmonary: Clear to ausculation.  No rales, rhonchi, or wheezing. Unlabored  Cardiovascular: Regular rate and rhythm without murmur. No ectopy  Abdominal: Soft, nontender, nondistended. Positive bowel sounds.   Skin: No rashes.  Extremities: no clubbing, cyanosis bilateral swelling hands. Multiple ulceration RLE approx 1 cm, 5 mm deep. Punches out appearance  Neurologic: Alert and oriented x4. Speech is fluent without dysarthria. Cranial nerves intact. Moving all extremities. Gait within normal limits.      Assessment/Plan:  1. Streptococcal infection  ANTISTREPTOLYSIN O    Elevated titer-no sore throat, tonsillitis, kidney disease. Does have recurrent skin bullae with erythema. recheck titer in 4 weeks. No indication for abx at th   2. Bullae      Query pathergy. Unknown or + or neg Nikols[place]ys. Hep C neg. Await path. Local care as well as risk of infection   3. Rheumatoid arthritis, involving unspecified site, unspecified rheumatoid factor presence (CMS-[facility])          Marissa Garner M.D.

## 2017-10-06 ENCOUNTER — PATIENT MESSAGE (OUTPATIENT)
Dept: RHEUMATOLOGY | Facility: PHYSICIAN GROUP | Age: 47
End: 2017-10-06

## 2017-10-07 ENCOUNTER — HOSPITAL ENCOUNTER (EMERGENCY)
Facility: MEDICAL CENTER | Age: 47
End: 2017-10-08
Attending: EMERGENCY MEDICINE
Payer: MEDICARE

## 2017-10-07 ENCOUNTER — APPOINTMENT (OUTPATIENT)
Dept: RADIOLOGY | Facility: MEDICAL CENTER | Age: 47
End: 2017-10-07
Attending: EMERGENCY MEDICINE
Payer: MEDICARE

## 2017-10-07 DIAGNOSIS — G43.009 MIGRAINE WITHOUT AURA AND WITHOUT STATUS MIGRAINOSUS, NOT INTRACTABLE: ICD-10-CM

## 2017-10-07 DIAGNOSIS — N39.0 ACUTE UTI: ICD-10-CM

## 2017-10-07 LAB
ANION GAP SERPL CALC-SCNC: 12 MMOL/L (ref 0–11.9)
APPEARANCE UR: CLEAR
BACTERIA #/AREA URNS HPF: ABNORMAL /HPF
BASOPHILS # BLD AUTO: 0.5 % (ref 0–1.8)
BASOPHILS # BLD: 0.04 K/UL (ref 0–0.12)
BILIRUB UR QL STRIP.AUTO: NEGATIVE
BUN SERPL-MCNC: 9 MG/DL (ref 8–22)
CALCIUM SERPL-MCNC: 9.6 MG/DL (ref 8.5–10.5)
CHLORIDE SERPL-SCNC: 105 MMOL/L (ref 96–112)
CK SERPL-CCNC: 408 U/L (ref 0–154)
CO2 SERPL-SCNC: 25 MMOL/L (ref 20–33)
COLOR UR: YELLOW
CREAT SERPL-MCNC: 0.65 MG/DL (ref 0.5–1.4)
CRP SERPL HS-MCNC: 4.6 MG/L (ref 0–7.5)
CULTURE IF INDICATED INDCX: YES UA CULTURE
EOSINOPHIL # BLD AUTO: 0 K/UL (ref 0–0.51)
EOSINOPHIL NFR BLD: 0 % (ref 0–6.9)
EPI CELLS #/AREA URNS HPF: ABNORMAL /HPF
ERYTHROCYTE [DISTWIDTH] IN BLOOD BY AUTOMATED COUNT: 45.5 FL (ref 35.9–50)
ERYTHROCYTE [SEDIMENTATION RATE] IN BLOOD BY WESTERGREN METHOD: 21 MM/HOUR (ref 0–20)
FLUAV+FLUBV AG SPEC QL IA: NORMAL
GFR SERPL CREATININE-BSD FRML MDRD: >60 ML/MIN/1.73 M 2
GLUCOSE SERPL-MCNC: 91 MG/DL (ref 65–99)
GLUCOSE UR STRIP.AUTO-MCNC: NEGATIVE MG/DL
HCT VFR BLD AUTO: 48.3 % (ref 37–47)
HGB BLD-MCNC: 16 G/DL (ref 12–16)
HYALINE CASTS #/AREA URNS LPF: ABNORMAL /LPF
IMM GRANULOCYTES # BLD AUTO: 0.02 K/UL (ref 0–0.11)
IMM GRANULOCYTES NFR BLD AUTO: 0.3 % (ref 0–0.9)
KETONES UR STRIP.AUTO-MCNC: NEGATIVE MG/DL
LEUKOCYTE ESTERASE UR QL STRIP.AUTO: ABNORMAL
LYMPHOCYTES # BLD AUTO: 0.98 K/UL (ref 1–4.8)
LYMPHOCYTES NFR BLD: 12.3 % (ref 22–41)
MCH RBC QN AUTO: 31.4 PG (ref 27–33)
MCHC RBC AUTO-ENTMCNC: 33.1 G/DL (ref 33.6–35)
MCV RBC AUTO: 94.7 FL (ref 81.4–97.8)
MICRO URNS: ABNORMAL
MONOCYTES # BLD AUTO: 0.36 K/UL (ref 0–0.85)
MONOCYTES NFR BLD AUTO: 4.5 % (ref 0–13.4)
NEUTROPHILS # BLD AUTO: 6.54 K/UL (ref 2–7.15)
NEUTROPHILS NFR BLD: 82.4 % (ref 44–72)
NITRITE UR QL STRIP.AUTO: NEGATIVE
NRBC # BLD AUTO: 0 K/UL
NRBC BLD AUTO-RTO: 0 /100 WBC
PH UR STRIP.AUTO: 7.5 [PH]
PLATELET # BLD AUTO: 425 K/UL (ref 164–446)
PMV BLD AUTO: 9.3 FL (ref 9–12.9)
POTASSIUM SERPL-SCNC: 4.1 MMOL/L (ref 3.6–5.5)
PROT UR QL STRIP: NEGATIVE MG/DL
RBC # BLD AUTO: 5.1 M/UL (ref 4.2–5.4)
RBC # URNS HPF: ABNORMAL /HPF
RBC UR QL AUTO: NEGATIVE
SIGNIFICANT IND 70042: NORMAL
SITE SITE: NORMAL
SODIUM SERPL-SCNC: 142 MMOL/L (ref 135–145)
SOURCE SOURCE: NORMAL
SP GR UR STRIP.AUTO: 1.01
UROBILINOGEN UR STRIP.AUTO-MCNC: 0.2 MG/DL
WBC # BLD AUTO: 7.9 K/UL (ref 4.8–10.8)
WBC #/AREA URNS HPF: ABNORMAL /HPF

## 2017-10-07 PROCEDURE — 82550 ASSAY OF CK (CPK): CPT

## 2017-10-07 PROCEDURE — 96372 THER/PROPH/DIAG INJ SC/IM: CPT

## 2017-10-07 PROCEDURE — 96375 TX/PRO/DX INJ NEW DRUG ADDON: CPT

## 2017-10-07 PROCEDURE — 700111 HCHG RX REV CODE 636 W/ 250 OVERRIDE (IP): Performed by: EMERGENCY MEDICINE

## 2017-10-07 PROCEDURE — 99284 EMERGENCY DEPT VISIT MOD MDM: CPT

## 2017-10-07 PROCEDURE — 700105 HCHG RX REV CODE 258: Performed by: EMERGENCY MEDICINE

## 2017-10-07 PROCEDURE — 86141 C-REACTIVE PROTEIN HS: CPT

## 2017-10-07 PROCEDURE — 81001 URINALYSIS AUTO W/SCOPE: CPT

## 2017-10-07 PROCEDURE — 87086 URINE CULTURE/COLONY COUNT: CPT

## 2017-10-07 PROCEDURE — 96374 THER/PROPH/DIAG INJ IV PUSH: CPT

## 2017-10-07 PROCEDURE — 80048 BASIC METABOLIC PNL TOTAL CA: CPT

## 2017-10-07 PROCEDURE — 87400 INFLUENZA A/B EACH AG IA: CPT

## 2017-10-07 PROCEDURE — 85025 COMPLETE CBC W/AUTO DIFF WBC: CPT

## 2017-10-07 PROCEDURE — 85652 RBC SED RATE AUTOMATED: CPT

## 2017-10-07 PROCEDURE — 71020 DX-CHEST-2 VIEWS: CPT

## 2017-10-07 RX ORDER — DIPHENHYDRAMINE HYDROCHLORIDE 50 MG/ML
25 INJECTION INTRAMUSCULAR; INTRAVENOUS ONCE
Status: COMPLETED | OUTPATIENT
Start: 2017-10-07 | End: 2017-10-07

## 2017-10-07 RX ORDER — SODIUM CHLORIDE 9 MG/ML
1000 INJECTION, SOLUTION INTRAVENOUS ONCE
Status: COMPLETED | OUTPATIENT
Start: 2017-10-07 | End: 2017-10-07

## 2017-10-07 RX ORDER — CEPHALEXIN 500 MG/1
500 CAPSULE ORAL 4 TIMES DAILY
Qty: 28 CAP | Refills: 0 | Status: SHIPPED | OUTPATIENT
Start: 2017-10-07 | End: 2017-10-14

## 2017-10-07 RX ORDER — CEFTRIAXONE 1 G/1
1 INJECTION, POWDER, FOR SOLUTION INTRAMUSCULAR; INTRAVENOUS ONCE
Status: COMPLETED | OUTPATIENT
Start: 2017-10-07 | End: 2017-10-07

## 2017-10-07 RX ORDER — METOCLOPRAMIDE HYDROCHLORIDE 5 MG/ML
10 INJECTION INTRAMUSCULAR; INTRAVENOUS ONCE
Status: COMPLETED | OUTPATIENT
Start: 2017-10-07 | End: 2017-10-07

## 2017-10-07 RX ORDER — KETOROLAC TROMETHAMINE 30 MG/ML
30 INJECTION, SOLUTION INTRAMUSCULAR; INTRAVENOUS ONCE
Status: COMPLETED | OUTPATIENT
Start: 2017-10-07 | End: 2017-10-07

## 2017-10-07 RX ADMIN — SODIUM CHLORIDE 1000 ML: 9 INJECTION, SOLUTION INTRAVENOUS at 22:18

## 2017-10-07 RX ADMIN — KETOROLAC TROMETHAMINE 30 MG: 30 INJECTION, SOLUTION INTRAMUSCULAR at 19:59

## 2017-10-07 RX ADMIN — HYDROMORPHONE HYDROCHLORIDE 1 MG: 1 INJECTION, SOLUTION INTRAMUSCULAR; INTRAVENOUS; SUBCUTANEOUS at 23:04

## 2017-10-07 RX ADMIN — METOCLOPRAMIDE 10 MG: 5 INJECTION, SOLUTION INTRAMUSCULAR; INTRAVENOUS at 22:15

## 2017-10-07 RX ADMIN — DIPHENHYDRAMINE HYDROCHLORIDE 25 MG: 50 INJECTION, SOLUTION INTRAMUSCULAR; INTRAVENOUS at 22:17

## 2017-10-07 RX ADMIN — CEFTRIAXONE SODIUM 1 G: 1 INJECTION, POWDER, FOR SOLUTION INTRAMUSCULAR; INTRAVENOUS at 21:15

## 2017-10-07 ASSESSMENT — PAIN SCALES - GENERAL
PAINLEVEL_OUTOF10: 7
PAINLEVEL_OUTOF10: 1
PAINLEVEL_OUTOF10: 1
PAINLEVEL_OUTOF10: 7
PAINLEVEL_OUTOF10: 2

## 2017-10-08 VITALS
DIASTOLIC BLOOD PRESSURE: 84 MMHG | BODY MASS INDEX: 28.31 KG/M2 | HEART RATE: 90 BPM | TEMPERATURE: 99 F | WEIGHT: 159.83 LBS | SYSTOLIC BLOOD PRESSURE: 129 MMHG | OXYGEN SATURATION: 99 % | RESPIRATION RATE: 18 BRPM

## 2017-10-08 ASSESSMENT — PAIN SCALES - WONG BAKER: WONGBAKER_NUMERICALRESPONSE: DOESN'T HURT AT ALL

## 2017-10-08 ASSESSMENT — PAIN SCALES - GENERAL: PAINLEVEL_OUTOF10: 0

## 2017-10-08 NOTE — ED NOTES
Pt reports having been seen by Dr. Garner with ID. Pt having multiple information from several Dr. And is not currently on abx.

## 2017-10-08 NOTE — ED NOTES
Enedelia Pang  47 y.o.  Chief Complaint   Patient presents with   • Cold Symptoms     headache, muscle aches, joint aching.     Pt states that she is s/p skin biopsy, one area on the leg came back positive for MRSA. Pt is not taking antibiotic. Has been seeing Infectious disease

## 2017-10-08 NOTE — ED NOTES
PT IS A HARD STICK X 2, LAB WAS CALLED FOR THE DRAW. FLU SWAB WAS DONE PT TOLARATED IT WELL. PT HAS FAMILY AT BEDSIDE.

## 2017-10-08 NOTE — ED NOTES
PT IS AAOX4, PT IS IN NAD, PT IS BEING D/C, PTS HEPLOCK WAS REMOVED INTACT AND BLEEDING WAS CONTROLLED. PT WAS GIVEN D/C INSTRUCTIONS AND SHE STATED UNDERSTANDING, PT HAS NO PAIN, PT IS ABLE TO AMB WOI ASSISTANCE. PT LEFT WITH FAMILY. NAD.

## 2017-10-08 NOTE — ED PROVIDER NOTES
"ER Provider Note     Scribed for Drew Bailey M.D. by Brian Moncada. 10/7/2017, 7:20 PM.    Primary Care Provider: Elliott Power M.D.  Means of Arrival: Walk-In   History obtained from: Patient  History limited by: None     CHIEF COMPLAINT  Chief Complaint   Patient presents with   • Cold Symptoms     headache, muscle aches, joint aching.     HPI  Enedelia Pang is a 47 y.o. female who presents to the Emergency Department complaining of \"burning\" generalized muscle aches and joints and mild swelling. The patient also has a migraine and has had significant cough the past week. The patient has had a fever throughout the night and used a Tylenol 1 hour prior to arrival with relief. She had a similar previous episode 1 year ago and had a CT of her head but is unsure of the rest of the treatment. However, whatever treatment she had worked and she felt better. The patient had a skin biopsy conducted recently which came back as MRSA or a clotting vasculitis. They advised if she started to feel uncomfortable the patient should come in for further evaluation. Denies dysuria or urinary issues. The patient has a dermatologist and sees Dr. Andrade, Rheumatology.    REVIEW OF SYSTEMS  See HPI for further details. All other systems are negative.    C.    PAST MEDICAL HISTORY   has a past medical history of Allergy, unspecified not elsewhere classified; Anemia (10/05/2017); Anxiety; autoimmune; Depression; H/O Clostridium difficile infection; MRSA infection; Hydrocephalus; Migraine with aura, with intractable migraine, so stated, without mention of status migrainosus; Pituitary abnormality (CMS-[facility]); and Stroke (CMS-HCC).    SURGICAL HISTORY   has a past surgical history that includes cholecystectomy; tonsillectomy; appendectomy; tubal ligation; other; other neurological surg; irrigation & debridement neuro (N/A, 6/28/2015); lumbar exploration (N/A, 8/31/2015); spinal cord stimulator (12/19/2016); and  shunt " "insertion (5/31/2017).    SOCIAL HISTORY  Social History   Substance Use Topics   • Smoking status: Never Smoker   • Smokeless tobacco: Never Used   • Alcohol use Yes      Comment: Rare      History   Drug Use No     FAMILY HISTORY  None noted    CURRENT MEDICATIONS  Home Medications     Reviewed by Audrey Bolivar R.N. (Registered Nurse) on 10/07/17 at 1911  Med List Status: Partial   Medication Last Dose Status   aspirin EC (ECOTRIN) 81 MG Tablet Delayed Response 10/7/2017 Active   diphenhydrAMINE (BENADRYL) 50 MG/ML Solution 10/7/2017 Active   duloxetine (CYMBALTA) 60 MG CPEP delayed-release capsule 10/7/2017 Active   gabapentin (NEURONTIN) 600 MG tablet 10/7/2017 Active   indomethacin (INDOCIN) 25 MG Cap 10/7/2017 Active   ketorolac (TORADOL) 60 MG/2ML Solution 10/6/2017 Active   Misc. Devices Misc  Active   Multiple Vitamins-Minerals (MULTIVITAMIN ADULT PO) 9/30/2017 Active   omeprazole (PRILOSEC) 20 MG delayed-release capsule 10/7/2017 Active   ondansetron (ZOFRAN ODT) 4 MG TABLET DISPERSIBLE 10/7/2017 Active   oxycodone immediate release (ROXICODONE) 10 MG immediate release tablet 10/7/2017 Active   risperidone (RISPERDAL) 0.5 MG Tab 10/7/2017 Active   Valerian Root 500 MG Cap 9/25/2017 Active              ALLERGIES  Allergies   Allergen Reactions   • Prochlorperazine Swelling     Tongue swelling. Reaction as a teen. (compazine)  Tolerated Phenergan on 02/24/15   • Sumatriptan Unspecified     Historical=\"Heart stops.\" Reaction  between 1995 to 1997   • Vancomycin Shortness of Breath and Rash     Reaction in 2005.  D/W patient 8/31/15 - tolerated loading dose of vancomycin administered on 8/30/15 with some itching to chest with decreased infusion rate. Red Man Syndrome     PHYSICAL EXAM  VITAL SIGNS: /82   Pulse (!) 128   Temp 36.7 °C (98.1 °F) (Temporal)   Resp 18   Wt 72.5 kg (159 lb 13.3 oz)   LMP 10/02/2017 (Approximate)   SpO2 100%   BMI 28.31 kg/m²      Constitutional: Alert in mild " apparent distress.  HENT: No signs of trauma, Bilateral external ears normal, Nose normal.   Eyes: Pupils are equal and reactive, Conjunctiva normal, Non-icteric.   Neck: Normal range of motion, No tenderness, Supple, No stridor.   Lymphatic: No lymphadenopathy noted.   Cardiovascular: Tachycardic rate, no murmurs.   Thorax & Lungs: Normal breath sounds, No respiratory distress, No wheezing, No chest tenderness.   Abdomen: Bowel sounds normal, Soft, No tenderness, No masses, No pulsatile masses. No peritoneal signs.  Skin: Warm, Dry.  Back: No bony tenderness, No CVA tenderness.   Extremities: Multiple small ulcerative lesions to legs and arms. Diffuse soreness in bilateral thighs and arms. Moving all extremities. No warmth to joints. No pain noted with ROM of bilateral elbows, shoulders, knees, or hip.  Musculoskeletal: Good range of motion in all major joints. No tenderness to palpation or major deformities noted.   Neurologic: Alert , Normal motor function, Normal sensory function, No focal deficits noted.   Psychiatric: Affect normal, Judgment normal, Mood normal.     DIAGNOSTIC STUDIES / PROCEDURES    LABS  Results for orders placed or performed during the hospital encounter of 10/07/17   CBC WITH DIFFERENTIAL   Result Value Ref Range    WBC 7.9 4.8 - 10.8 K/uL    RBC 5.10 4.20 - 5.40 M/uL    Hemoglobin 16.0 12.0 - 16.0 g/dL    Hematocrit 48.3 (H) 37.0 - 47.0 %    MCV 94.7 81.4 - 97.8 fL    MCH 31.4 27.0 - 33.0 pg    MCHC 33.1 (L) 33.6 - 35.0 g/dL    RDW 45.5 35.9 - 50.0 fL    Platelet Count 425 164 - 446 K/uL    MPV 9.3 9.0 - 12.9 fL    Neutrophils-Polys 82.40 (H) 44.00 - 72.00 %    Lymphocytes 12.30 (L) 22.00 - 41.00 %    Monocytes 4.50 0.00 - 13.40 %    Eosinophils 0.00 0.00 - 6.90 %    Basophils 0.50 0.00 - 1.80 %    Immature Granulocytes 0.30 0.00 - 0.90 %    Nucleated RBC 0.00 /100 WBC    Neutrophils (Absolute) 6.54 2.00 - 7.15 K/uL    Lymphs (Absolute) 0.98 (L) 1.00 - 4.80 K/uL    Monos (Absolute) 0.36  0.00 - 0.85 K/uL    Eos (Absolute) 0.00 0.00 - 0.51 K/uL    Baso (Absolute) 0.04 0.00 - 0.12 K/uL    Immature Granulocytes (abs) 0.02 0.00 - 0.11 K/uL    NRBC (Absolute) 0.00 K/uL   BASIC METABOLIC PANEL   Result Value Ref Range    Sodium 142 135 - 145 mmol/L    Potassium 4.1 3.6 - 5.5 mmol/L    Chloride 105 96 - 112 mmol/L    Co2 25 20 - 33 mmol/L    Glucose 91 65 - 99 mg/dL    Bun 9 8 - 22 mg/dL    Creatinine 0.65 0.50 - 1.40 mg/dL    Calcium 9.6 8.5 - 10.5 mg/dL    Anion Gap 12.0 (H) 0.0 - 11.9   WESTERGREN SED RATE   Result Value Ref Range    Sed Rate Westergren 21 (H) 0 - 20 mm/hour   CRP HIGH SENSITIVE (CARDIAC)   Result Value Ref Range    C Reactive Protein High Sensitive 4.6 0.0 - 7.5 mg/L   URINALYSIS CULTURE, IF INDICATED   Result Value Ref Range    Color Yellow     Character Clear     Specific Gravity 1.007 <1.035    Ph 7.5 5.0 - 8.0    Glucose Negative Negative mg/dL    Ketones Negative Negative mg/dL    Protein Negative Negative mg/dL    Bilirubin Negative Negative    Urobilinogen, Urine 0.2 Negative    Nitrite Negative Negative    Leukocyte Esterase Small (A) Negative    Occult Blood Negative Negative    Micro Urine Req Microscopic     Culture Indicated Yes UA Culture   CREATINE KINASE   Result Value Ref Range    CPK Total 408 (H) 0 - 154 U/L   INFLUENZA RAPID   Result Value Ref Range    Significant Indicator NEG     Source RESP     Site Throat     Rapid Influenza A-B       Negative for Influenza A and Influenza B antigens.  Infection due to influenza A or B cannot be ruled out  since the antigen present in the specimen may be below the  detection limit of the test. Culture confirmation of  negative samples is recommended.     URINE MICROSCOPIC (W/UA)   Result Value Ref Range    WBC 5-10 (A) /hpf    RBC 0-2 /hpf    Bacteria Moderate (A) None /hpf    Epithelial Cells Few /hpf    Hyaline Cast 0-2 /lpf   ESTIMATED GFR   Result Value Ref Range    GFR If African American >60 >60 mL/min/1.73 m 2    GFR If  Non African American >60 >60 mL/min/1.73 m 2     All labs reviewed by me.    RADIOLOGY  DX-CHEST-2 VIEWS   Final Result         1. No active cardiopulmonary abnormalities are identified.         The radiologist's interpretation of all radiological studies have been reviewed by me.    COURSE & MEDICAL DECISION MAKING  Pertinent Labs & Imaging studies reviewed. (See chart for details)    This is a 47 y.o. female that presents With what appears to be a worsening of the patient's rheumatological disease. I'm concerned this may be as result of infection. The patient also is having a headache which is similar to her typical migraines which she believes is related to worsening pain throughout her body. I'm concerned that she may have a CPK elevation given the diffuse muscle soreness. There is no joint in particular that is sore to suggest that she has an infected joint. I will obtain the below laboratory tests to assess if the patient is having a rheumatological flare worse having an elevated muscle breakdown as well as a possible source of infection that may be because of the patient's rheumatological flare.    7:20 PM - Patient seen and examined at bedside. Ordered DX-Chest, Urine Microscopic, Westergren, CRP, Urinalysis, Creatine Kinase, Influenza Rapid, CBC, BMP.  Patient will be medicated with 30mg Toradol for her symptoms.     7:36 PM - Paged Dr. Andrade, Rheumatology.     7:42 PM - Consulted with Dr. Gill, Rheumatology, for Dr. Andrade who is not aware of the patient and cannot assist but did mention that she would look up the patient the morning and follow up on the case to assure that there is nothing being missed.    Labs Reviewed   CBC WITH DIFFERENTIAL - Abnormal; Notable for the following:        Result Value    Hematocrit 48.3 (*)     MCHC 33.1 (*)     Neutrophils-Polys 82.40 (*)     Lymphocytes 12.30 (*)     Lymphs (Absolute) 0.98 (*)     All other components within normal limits   BASIC METABOLIC PANEL -  Abnormal; Notable for the following:     Anion Gap 12.0 (*)     All other components within normal limits   WESTERGREN SED RATE - Abnormal; Notable for the following:     Sed Rate Westergren 21 (*)     All other components within normal limits   URINALYSIS,CULTURE IF INDICATED - Abnormal; Notable for the following:     Leukocyte Esterase Small (*)     All other components within normal limits   CREATINE KINASE - Abnormal; Notable for the following:     CPK Total 408 (*)     All other components within normal limits   URINE MICROSCOPIC (W/UA) - Abnormal; Notable for the following:     WBC 5-10 (*)     Bacteria Moderate (*)     All other components within normal limits   CRP HIGH SENSITIVE (CARDIAC)   INFLUENZA RAPID   URINE CULTURE(NEW)   ESTIMATED GFR     The patient was found to have bacteria in her urine as well as pyuria as well as elevated leukocyte esterase. The patient did have an elevated sed rate but no elevation in her CRP. The patient had no anemia noted on exam and no leukocytosis. Patient's chest x-ray did not show infection. The patient's headache improved with fluids as well as Reglan. The patient is given ceftriaxone for her urinary tract infection and is now feeling much better. I believe that part of this rheumatologic flare is related to her urinary tract infection. I'll have her follow up with her rheumatologist as soon as possible and her primary care physician as well. Her CPK is mildly elevated but not within a dangerous level. At this time I'll discharge patient home with treatment for her urinary tract infection and follow-up. Strict return precautions were given. Patient has a mild anion gap which is likely related to dehydration.      FINAL IMPRESSION  1. Acute UTI    2. Migraine without aura and without status migrainosus, not intractable          IBrian (Scribe), am scribing for, and in the presence of, Drew Bailey M.D..    Electronically signed by: Brian Moncada  (Scribe), 10/7/2017    IDrew M.D. personally performed the services described in this documentation, as scribed by Brian Moncada in my presence, and it is both accurate and complete.     The note accurately reflects work and decisions made by me.  Drew Bailey  10/8/2017  12:12 AM

## 2017-10-08 NOTE — DISCHARGE INSTRUCTIONS
Urinary Tract Infection  Urinary tract infections (UTIs) can develop anywhere along your urinary tract. Your urinary tract is your body's drainage system for removing wastes and extra water. Your urinary tract includes two kidneys, two ureters, a bladder, and a urethra. Your kidneys are a pair of bean-shaped organs. Each kidney is about the size of your fist. They are located below your ribs, one on each side of your spine.  CAUSES  Infections are caused by microbes, which are microscopic organisms, including fungi, viruses, and bacteria. These organisms are so small that they can only be seen through a microscope. Bacteria are the microbes that most commonly cause UTIs.  SYMPTOMS   Symptoms of UTIs may vary by age and gender of the patient and by the location of the infection. Symptoms in young women typically include a frequent and intense urge to urinate and a painful, burning feeling in the bladder or urethra during urination. Older women and men are more likely to be tired, shaky, and weak and have muscle aches and abdominal pain. A fever may mean the infection is in your kidneys. Other symptoms of a kidney infection include pain in your back or sides below the ribs, nausea, and vomiting.  DIAGNOSIS  To diagnose a UTI, your caregiver will ask you about your symptoms. Your caregiver also will ask to provide a urine sample. The urine sample will be tested for bacteria and white blood cells. White blood cells are made by your body to help fight infection.  TREATMENT   Typically, UTIs can be treated with medication. Because most UTIs are caused by a bacterial infection, they usually can be treated with the use of antibiotics. The choice of antibiotic and length of treatment depend on your symptoms and the type of bacteria causing your infection.  HOME CARE INSTRUCTIONS  · If you were prescribed antibiotics, take them exactly as your caregiver instructs you. Finish the medication even if you feel better after you  have only taken some of the medication.  · Drink enough water and fluids to keep your urine clear or pale yellow.  · Avoid caffeine, tea, and carbonated beverages. They tend to irritate your bladder.  · Empty your bladder often. Avoid holding urine for long periods of time.  · Empty your bladder before and after sexual intercourse.  · After a bowel movement, women should cleanse from front to back. Use each tissue only once.  SEEK MEDICAL CARE IF:   · You have back pain.  · You develop a fever.  · Your symptoms do not begin to resolve within 3 days.  SEEK IMMEDIATE MEDICAL CARE IF:   · You have severe back pain or lower abdominal pain.  · You develop chills.  · You have nausea or vomiting.  · You have continued burning or discomfort with urination.  MAKE SURE YOU:   · Understand these instructions.  · Will watch your condition.  · Will get help right away if you are not doing well or get worse.     This information is not intended to replace advice given to you by your health care provider. Make sure you discuss any questions you have with your health care provider.     Document Released: 09/27/2006 Document Revised: 01/08/2016 Document Reviewed: 01/25/2013  [employer] Patient Education ©2016 [employer]

## 2017-10-09 ENCOUNTER — PATIENT OUTREACH (OUTPATIENT)
Dept: HEALTH INFORMATION MANAGEMENT | Facility: OTHER | Age: 47
End: 2017-10-09

## 2017-10-09 LAB
BACTERIA UR CULT: NORMAL
SIGNIFICANT IND 70042: NORMAL
SITE SITE: NORMAL
SOURCE SOURCE: NORMAL

## 2017-10-09 NOTE — PROGRESS NOTES
Placed discharge outreach phone call to patient s/p ER discharge 10/8/17.  Spoke with pt's  Benoit, provided my contact information and instructions for pt to call with any questions or concerns.

## 2017-10-10 ENCOUNTER — TELEPHONE (OUTPATIENT)
Dept: RHEUMATOLOGY | Facility: PHYSICIAN GROUP | Age: 47
End: 2017-10-10

## 2017-10-10 ENCOUNTER — APPOINTMENT (RX ONLY)
Dept: URBAN - METROPOLITAN AREA CLINIC 20 | Facility: CLINIC | Age: 47
Setting detail: DERMATOLOGY
End: 2017-10-10

## 2017-10-10 DIAGNOSIS — I99.9 VASCULOPATHY: ICD-10-CM

## 2017-10-10 DIAGNOSIS — B95.61 METHICILLIN SUSCEPTIBLE STAPHYLOCOCCUS AUREUS INFECTION AS THE CAUSE OF DISEASES CLASSIFIED ELSEWHERE: ICD-10-CM

## 2017-10-10 PROCEDURE — ? TREATMENT REGIMEN

## 2017-10-10 PROCEDURE — ? PRESCRIPTION

## 2017-10-10 RX ORDER — MUPIROCIN 20 MG/G
OINTMENT TOPICAL
Qty: 1 | Refills: 1 | Status: ERX | COMMUNITY
Start: 2017-10-10

## 2017-10-10 RX ADMIN — MUPIROCIN: 20 OINTMENT TOPICAL at 00:00

## 2017-10-10 NOTE — ADDENDUM NOTE
Encounter addended by: Nabor Echeverria M.D. on: 10/10/2017  9:07 AM<BR>    Actions taken: Delete clinical note

## 2017-10-10 NOTE — TELEPHONE ENCOUNTER
Called back.  Brie is not in the office    Spoke with Dr. Gagnon    On biopsy of recurring ulcers and clot on her dorsal hand  On biopsy it looked like the cause of the ulcer arterial side with thrombus with epidermal necrosis.  No actual vasculitis.    Noted patient's anticardiolipin and beta 2 glycoprotein were negative in 9/15/2017    Will fax over biopsy report to us.

## 2017-10-11 ENCOUNTER — HOSPITAL ENCOUNTER (EMERGENCY)
Facility: MEDICAL CENTER | Age: 47
End: 2017-10-12
Attending: EMERGENCY MEDICINE
Payer: MEDICARE

## 2017-10-11 DIAGNOSIS — G43.109 MIGRAINE WITH AURA AND WITHOUT STATUS MIGRAINOSUS, NOT INTRACTABLE: ICD-10-CM

## 2017-10-11 DIAGNOSIS — M79.10 MYALGIA: ICD-10-CM

## 2017-10-11 PROCEDURE — 99284 EMERGENCY DEPT VISIT MOD MDM: CPT

## 2017-10-11 PROCEDURE — 87086 URINE CULTURE/COLONY COUNT: CPT

## 2017-10-11 RX ORDER — SODIUM CHLORIDE 9 MG/ML
1000 INJECTION, SOLUTION INTRAVENOUS ONCE
Status: COMPLETED | OUTPATIENT
Start: 2017-10-12 | End: 2017-10-12

## 2017-10-11 RX ORDER — METOCLOPRAMIDE HYDROCHLORIDE 5 MG/ML
10 INJECTION INTRAMUSCULAR; INTRAVENOUS ONCE
Status: COMPLETED | OUTPATIENT
Start: 2017-10-12 | End: 2017-10-12

## 2017-10-11 RX ORDER — MORPHINE SULFATE 4 MG/ML
4 INJECTION, SOLUTION INTRAMUSCULAR; INTRAVENOUS ONCE
Status: COMPLETED | OUTPATIENT
Start: 2017-10-12 | End: 2017-10-12

## 2017-10-11 ASSESSMENT — LIFESTYLE VARIABLES
HAVE YOU EVER FELT YOU SHOULD CUT DOWN ON YOUR DRINKING: NO
TOTAL SCORE: 0
EVER HAD A DRINK FIRST THING IN THE MORNING TO STEADY YOUR NERVES TO GET RID OF A HANGOVER: NO
TOTAL SCORE: 0
CONSUMPTION TOTAL: INCOMPLETE
HAVE PEOPLE ANNOYED YOU BY CRITICIZING YOUR DRINKING: NO
TOTAL SCORE: 0
EVER FELT BAD OR GUILTY ABOUT YOUR DRINKING: NO
DO YOU DRINK ALCOHOL: YES

## 2017-10-11 ASSESSMENT — PAIN SCALES - GENERAL: PAINLEVEL_OUTOF10: 8

## 2017-10-11 NOTE — TELEPHONE ENCOUNTER
Please call patient.    I have ordered additional labs and made a referral to hematology based on my discussion with dermatology.    Please make sure she is taking aspirin 81 mg daily

## 2017-10-12 ENCOUNTER — APPOINTMENT (OUTPATIENT)
Dept: RADIOLOGY | Facility: MEDICAL CENTER | Age: 47
End: 2017-10-12
Attending: EMERGENCY MEDICINE
Payer: MEDICARE

## 2017-10-12 ENCOUNTER — TELEPHONE (OUTPATIENT)
Dept: HEMATOLOGY ONCOLOGY | Facility: MEDICAL CENTER | Age: 47
End: 2017-10-12

## 2017-10-12 VITALS
DIASTOLIC BLOOD PRESSURE: 88 MMHG | RESPIRATION RATE: 15 BRPM | HEART RATE: 87 BPM | HEIGHT: 63 IN | SYSTOLIC BLOOD PRESSURE: 129 MMHG | BODY MASS INDEX: 29.14 KG/M2 | OXYGEN SATURATION: 99 % | TEMPERATURE: 97.4 F | WEIGHT: 164.46 LBS

## 2017-10-12 LAB
ALBUMIN SERPL BCP-MCNC: 3.9 G/DL (ref 3.2–4.9)
ALBUMIN/GLOB SERPL: 1.3 G/DL
ALP SERPL-CCNC: 162 U/L (ref 30–99)
ALT SERPL-CCNC: 46 U/L (ref 2–50)
ANION GAP SERPL CALC-SCNC: 10 MMOL/L (ref 0–11.9)
APPEARANCE UR: CLEAR
AST SERPL-CCNC: 27 U/L (ref 12–45)
BACTERIA #/AREA URNS HPF: ABNORMAL /HPF
BASOPHILS # BLD AUTO: 0.8 % (ref 0–1.8)
BASOPHILS # BLD: 0.05 K/UL (ref 0–0.12)
BILIRUB SERPL-MCNC: 0.4 MG/DL (ref 0.1–1.5)
BILIRUB UR QL STRIP.AUTO: NEGATIVE
BUN SERPL-MCNC: 11 MG/DL (ref 8–22)
CALCIUM SERPL-MCNC: 8.8 MG/DL (ref 8.5–10.5)
CHLORIDE SERPL-SCNC: 108 MMOL/L (ref 96–112)
CK SERPL-CCNC: 51 U/L (ref 0–154)
CO2 SERPL-SCNC: 21 MMOL/L (ref 20–33)
COLOR UR: YELLOW
CREAT SERPL-MCNC: 0.78 MG/DL (ref 0.5–1.4)
CULTURE IF INDICATED INDCX: YES UA CULTURE
EOSINOPHIL # BLD AUTO: 0 K/UL (ref 0–0.51)
EOSINOPHIL NFR BLD: 0 % (ref 0–6.9)
EPI CELLS #/AREA URNS HPF: ABNORMAL /HPF
ERYTHROCYTE [DISTWIDTH] IN BLOOD BY AUTOMATED COUNT: 44 FL (ref 35.9–50)
GFR SERPL CREATININE-BSD FRML MDRD: >60 ML/MIN/1.73 M 2
GLOBULIN SER CALC-MCNC: 2.9 G/DL (ref 1.9–3.5)
GLUCOSE SERPL-MCNC: 100 MG/DL (ref 65–99)
GLUCOSE UR STRIP.AUTO-MCNC: NEGATIVE MG/DL
HCT VFR BLD AUTO: 39.7 % (ref 37–47)
HGB BLD-MCNC: 12.9 G/DL (ref 12–16)
IMM GRANULOCYTES # BLD AUTO: 0.03 K/UL (ref 0–0.11)
IMM GRANULOCYTES NFR BLD AUTO: 0.5 % (ref 0–0.9)
KETONES UR STRIP.AUTO-MCNC: NEGATIVE MG/DL
LEUKOCYTE ESTERASE UR QL STRIP.AUTO: ABNORMAL
LIPASE SERPL-CCNC: 17 U/L (ref 11–82)
LYMPHOCYTES # BLD AUTO: 1.5 K/UL (ref 1–4.8)
LYMPHOCYTES NFR BLD: 23.7 % (ref 22–41)
MCH RBC QN AUTO: 30.5 PG (ref 27–33)
MCHC RBC AUTO-ENTMCNC: 32.5 G/DL (ref 33.6–35)
MCV RBC AUTO: 93.9 FL (ref 81.4–97.8)
MICRO URNS: ABNORMAL
MONOCYTES # BLD AUTO: 0.39 K/UL (ref 0–0.85)
MONOCYTES NFR BLD AUTO: 6.2 % (ref 0–13.4)
NEUTROPHILS # BLD AUTO: 4.35 K/UL (ref 2–7.15)
NEUTROPHILS NFR BLD: 68.8 % (ref 44–72)
NITRITE UR QL STRIP.AUTO: NEGATIVE
NRBC # BLD AUTO: 0 K/UL
NRBC BLD AUTO-RTO: 0 /100 WBC
PH UR STRIP.AUTO: 6 [PH]
PLATELET # BLD AUTO: 380 K/UL (ref 164–446)
PMV BLD AUTO: 9.7 FL (ref 9–12.9)
POTASSIUM SERPL-SCNC: 3.9 MMOL/L (ref 3.6–5.5)
PROT SERPL-MCNC: 6.8 G/DL (ref 6–8.2)
PROT UR QL STRIP: NEGATIVE MG/DL
RBC # BLD AUTO: 4.23 M/UL (ref 4.2–5.4)
RBC # URNS HPF: ABNORMAL /HPF
RBC UR QL AUTO: NEGATIVE
SODIUM SERPL-SCNC: 139 MMOL/L (ref 135–145)
SP GR UR STRIP.AUTO: 1
UROBILINOGEN UR STRIP.AUTO-MCNC: 0.2 MG/DL
WBC # BLD AUTO: 6.3 K/UL (ref 4.8–10.8)
WBC #/AREA URNS HPF: ABNORMAL /HPF

## 2017-10-12 PROCEDURE — 700105 HCHG RX REV CODE 258: Performed by: EMERGENCY MEDICINE

## 2017-10-12 PROCEDURE — 700117 HCHG RX CONTRAST REV CODE 255: Performed by: EMERGENCY MEDICINE

## 2017-10-12 PROCEDURE — 81001 URINALYSIS AUTO W/SCOPE: CPT

## 2017-10-12 PROCEDURE — 80053 COMPREHEN METABOLIC PANEL: CPT

## 2017-10-12 PROCEDURE — 700111 HCHG RX REV CODE 636 W/ 250 OVERRIDE (IP): Performed by: EMERGENCY MEDICINE

## 2017-10-12 PROCEDURE — 70450 CT HEAD/BRAIN W/O DYE: CPT

## 2017-10-12 PROCEDURE — 85025 COMPLETE CBC W/AUTO DIFF WBC: CPT

## 2017-10-12 PROCEDURE — 83690 ASSAY OF LIPASE: CPT

## 2017-10-12 PROCEDURE — 96374 THER/PROPH/DIAG INJ IV PUSH: CPT

## 2017-10-12 PROCEDURE — 36415 COLL VENOUS BLD VENIPUNCTURE: CPT

## 2017-10-12 PROCEDURE — 96375 TX/PRO/DX INJ NEW DRUG ADDON: CPT

## 2017-10-12 PROCEDURE — 82550 ASSAY OF CK (CPK): CPT

## 2017-10-12 RX ORDER — DIPHENHYDRAMINE HYDROCHLORIDE 50 MG/ML
50 INJECTION INTRAMUSCULAR; INTRAVENOUS ONCE
Status: COMPLETED | OUTPATIENT
Start: 2017-10-12 | End: 2017-10-12

## 2017-10-12 RX ADMIN — MORPHINE SULFATE 4 MG: 4 INJECTION INTRAVENOUS at 00:56

## 2017-10-12 RX ADMIN — METOCLOPRAMIDE 10 MG: 5 INJECTION, SOLUTION INTRAMUSCULAR; INTRAVENOUS at 00:56

## 2017-10-12 RX ADMIN — DIPHENHYDRAMINE HYDROCHLORIDE 50 MG: 50 INJECTION, SOLUTION INTRAMUSCULAR; INTRAVENOUS at 00:57

## 2017-10-12 RX ADMIN — SODIUM CHLORIDE 1000 ML: 9 INJECTION, SOLUTION INTRAVENOUS at 00:56

## 2017-10-12 NOTE — ED PROVIDER NOTES
ED Provider Note    Scribed for Lluvia Card M.D. by Espinoza Hernandez. 10/11/2017, 11:43 PM.    Primary care provider: Elliott Power M.D.  Means of arrival: Walk-in  History obtained from: Patient  History limited by: None    CHIEF COMPLAINT  Chief Complaint   Patient presents with   • Body Aches     x5 days, generalized body ache/burn gradually worsening despite medication, pain 8/10.   • Migraine     x2 days   • N/V     x2 days   • Cough     x1 week   • Muscle Ache     generalized, pain 9/10.       HPI  Enedelia Pang is a 47 y.o. female who presents to the Emergency Department complaining of generalized body aches onset five days ago. She was seen here at onset of her body aches and diagnosed with UTI. No inflamamtion or vascular abnormalities were seen on imaging during her last visit and she was referred to hematology instead of rheumatology. The patient is still taking her antibiotics, but her home benadryl, toradol, and oxycodone have not supplied relief of her pain. She reportedly took zofran at 10:00 AM this morning as well. She denies severe symptoms after her last visit, but tonight her body aches were severe enough to prompt her to present here. The patient reports associated nausea and vomiting secondary to a migraine. Her migraine is located behind her right eye and onset at 10:00 AM this morning. The migraine is similar in quality to her previous chronic migraines and exacerbated by bright lights, but not loud noises. She denies dysuria. 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 She reportedly experiences muscle tightening when receiving compazine.    REVIEW OF SYSTEMS  Pertinent positives include generalized body aches, nausea, vomiting, and migraine. Pertinent negatives include no dysuria. See HPI for further details. All other systems are negative.   C    PAST MEDICAL HISTORY   has a past medical history of Allergy, unspecified not  "elsewhere classified; Anemia (10/05/2017); Anxiety; autoimmune; Depression; H/O Clostridium difficile infection; MRSA infection; Hydrocephalus; Migraine with aura, with intractable migraine, so stated, without mention of status migrainosus; Pituitary abnormality (CMS-HCC); and Stroke (CMS-[facility]).    SURGICAL HISTORY   has a past surgical history that includes cholecystectomy; tonsillectomy; appendectomy; tubal ligation; other; other neurological surg; irrigation & debridement neuro (N/A, 6/28/2015); lumbar exploration (N/A, 8/31/2015); spinal cord stimulator (12/19/2016); and  shunt insertion (5/31/2017).    SOCIAL HISTORY  Social History   Substance Use Topics   • Smoking status: Never Smoker   • Smokeless tobacco: Never Used   • Alcohol use Yes      Comment: Rare      History   Drug Use No       FAMILY HISTORY  History reviewed. No pertinent family history.    CURRENT MEDICATIONS  Reviewed. See Encounter Summary.     ALLERGIES  Allergies   Allergen Reactions   • Prochlorperazine Swelling     Tongue swelling. Reaction as a teen. (compazine)  Tolerated Phenergan on 02/24/15   • Sumatriptan Unspecified     Historical=\"Heart stops.\" Reaction  between 1995 to 1997   • Vancomycin Shortness of Breath and Rash     Reaction in 2005.  D/W patient 8/31/15 - tolerated loading dose of vancomycin administered on 8/30/15 with some itching to chest with decreased infusion rate. Red Man Syndrome       PHYSICAL EXAM  VITAL SIGNS: /88   Pulse (!) 105   Temp 36.3 °C (97.4 °F)   Resp 17   Ht 1.6 m (5' 3\")   Wt 74.6 kg (164 lb 7.4 oz)   LMP 10/02/2017 (Approximate)   SpO2 88%   BMI 29.13 kg/m²    Pulse ox interpretation: I interpret this pulse ox as normal.  Constitutional: Alert in no apparent distress.  HENT: No signs of trauma, Bilateral external ears normal, Nose normal.   Eyes: PERR, Conjunctiva normal, Non-icteric.   Neck: Normal range of motion, No tenderness, Supple, No stridor.   Lymphatic: No lymphadenopathy " noted.   Cardiovascular: Regular rate and rhythm, no murmurs.   Thorax & Lungs: Normal breath sounds, No respiratory distress, No wheezing, No chest tenderness.   Abdomen: Bowel sounds normal, Soft, No tenderness, No pulsatile masses. No peritoneal signs.  Skin: Warm, Dry, No erythema, No rash. Several superficial sutures on the dorsum of the left hand and lower extremities  Back: No bony tenderness, No CVA tenderness.   Extremities: Intact distal pulses, No edema, No tenderness, No cyanosis  Musculoskeletal: Good range of motion in all major joints. No tenderness to palpation or major deformities noted.   Neurologic: Alert and oriented, No focal deficits noted.       DIFFERENTIAL DIAGNOSIS AND WORK UP PLAN    11:43 PM Patient seen and examined at bedside. The patient presents with headache likely consistent w migraine and general myalgias and the differential diagnosis includes but is not limited to migraine vs shunt malfunction vs electrolyte abnormality vs muscle breakdown vs acute renal injury. Ordered for CT head without contrast, CBC with differential, CMP, Lipase, Creatine kinase, U/A culture if indicated, and urine culture to evaluate. Patient will be treated with Reglan injection 10 mg and morphine injection 4 mg for her symptoms. She will receive NS infusion 1 liter for concern for dehydration.      DIAGNOSTIC STUDIES / PROCEDURES     LABS  cbc, CMp wnl, UA mod epi cells and mod bacteria - urine cx from 3 days prior negative  CK normal  Lipase normal  All labs were reviewed by me.      RADIOLOGY  CT-HEAD W/O   Final Result      1.  No acute intracranial abnormality.   2.  No evidence for hydrocephalus.   3.  RIGHT frontal  shunt catheter again present and in similar position.        The radiologist's interpretation of all radiological studies have been reviewed by me.    COURSE & MEDICAL DECISION MAKING  Pertinent Labs & Imaging studies reviewed. (See chart for details)    12:39 AM I ordered for urine  "microscopic with U/A to evaluate.    12:41 AM I ordered for benadryl injection 50 mg to treat.    12:49 AM I ordered estimated GFR to evaluate.    1:38 AM Recheck: Patient re-evaluated at beside. Patient reports that her headache is almost gone and that her muscle aches are still present, but improving. Discussed patient's condition and treatment plan. Patient's lab and radiology results discussed. The patient understood and is in agreement.     2:05 AM - Re-examined; The patient is resting in bed comfortably. I discussed her above findings were overall unremarkable and plans for discharge with a referral to Dr. Power, PCP, Dr. Andrade, Rheumatology, and instructed to return to the ED if her symptoms worsen. Patient understands and agrees. Her vitals prior to discharge are:     /88   Pulse 84   Temp 36.3 °C (97.4 °F)   Resp (!) 11   Ht 1.6 m (5' 3\")   Wt 74.6 kg (164 lb 7.4 oz)   LMP 10/02/2017 (Approximate)   SpO2 96%   BMI 29.13 kg/m²     This is a 47 y.o. year old female who presents with generalized myalgias and a migraine headache consistent with prior improved with Benadryl Reglan and morphine and IV fluids. No evidence of change in her  shunts, no evidence of infection her urine culture for a few days prior shows contaminated only which I suspect is the process today's visit with moderate epithelial cells. Could be potentially a rheumatologic flare, she is following up with Dr. Andrade as well as hematology. She feels comfortable going home and return to the ED with any new or worsening symptoms not controlled at home.    The patient will return for new or worsening symptoms and is stable at the time of discharge.    Your blood pressure is high today.  This requires followup by a primary care doctor.  Please keep a log of your blood pressures if possible to take to your doctor appointment.  Try to increase the amount of exercise you do in your day to day living, and eliminate sodas and other " sweetened beverages from your diet.    DISPOSITION:  Patient will be discharged home in stable condition.    FOLLOW UP:  Elliott Power M.D.  [address]  [facility] [address]  [phone]    Schedule an appointment as soon as possible for a visit      [facility], Emergency Dept  [address]  [facility] [id number] [phone]    If symptoms worsen    Aimee Andrade M.D.  [address]  [address]  [facility] [id number] [phone]    Call      FINAL IMPRESSION  1. Migraine with aura and without status migrainosus, not intractable    2. Myalgia          IEspinoza (Scribe), am scribing for, and in the presence of, Lluvia Card M.D..    Electronically signed by: Espinoza Hernandez (Scribe), 10/11/2017    ILluvia M.D. personally performed the services described in this documentation, as scribed by Espinoza Hernandez in my presence, and it is both accurate and complete.    The note accurately reflects work and decisions made by me.  Lluvia Card  10/12/2017  2:56 AM    This dictation has been created using voice recognition software and/or scribes. The accuracy of the dictation is limited by the abilities of the software and the expertise of the scribes. I expect there may be some errors of grammar and possibly content. I made every attempt to manually correct the errors within my dictation. However, errors related to voice recognition software and/or scribes may still exist and should be interpreted within the appropriate context.

## 2017-10-12 NOTE — ED NOTES
"Pt brought back to rm RD 10 from triage. Pt c/o 9/10 generalized body aches and \"burning\" in her joints x4 days with worsening of symptoms today, as well as head ache. Pt able to transfer self to bed, pt in gown, on monitor, family at bedside, call light in reach. Chart up for ERP.  "

## 2017-10-12 NOTE — ED NOTES
Discharge orders received, IV and monitor discontinued, instructions and education given, follow-up discussed, pt verbalized understanding.

## 2017-10-12 NOTE — ED NOTES
Pt resting comfortably on gurney. Pt with no changes from previous assessments. Respirations are even and unlabored.

## 2017-10-12 NOTE — ED NOTES
PIV line established. Blood drawn and sent to the lab. Pt medicated per MAR. Lights dimmed for comfort. Call light within reach.

## 2017-10-12 NOTE — TELEPHONE ENCOUNTER
1st attempt to contact the patient- Lm on voice mail for the patient requsting a call back to schedule a NP Hematology appointment.  Ref:Aimee Andrade  Dx: Vasculopathy

## 2017-10-12 NOTE — ED NOTES
Enedelia Pang  47 y.o.  Chief Complaint   Patient presents with   • Body Aches     x5 days, generalized body ache/burn gradually worsening despite medication, pain 8/10.   • Migraine     x2 days   • N/V     x2 days   • Cough     x1 week   • Muscle Ache     generalized, pain 9/10.     Patint reports diagnosed with autoimmune disease, reports s/s may be related.      Explained wait time and triage process to pt. Pt placed back out in lobby, told to notify ED tech or triage RN of any changes, verbalized understanding.

## 2017-10-13 ENCOUNTER — NON-PROVIDER VISIT (OUTPATIENT)
Dept: NEUROLOGY | Facility: MEDICAL CENTER | Age: 47
End: 2017-10-13
Payer: MEDICARE

## 2017-10-13 DIAGNOSIS — R46.89 SPELL OF ABNORMAL BEHAVIOR: ICD-10-CM

## 2017-10-13 PROCEDURE — 95819 EEG AWAKE AND ASLEEP: CPT | Performed by: PSYCHIATRY & NEUROLOGY

## 2017-10-14 ENCOUNTER — HOSPITAL ENCOUNTER (INPATIENT)
Facility: MEDICAL CENTER | Age: 47
LOS: 3 days | DRG: 545 | End: 2017-10-19
Attending: EMERGENCY MEDICINE | Admitting: HOSPITALIST
Payer: MEDICARE

## 2017-10-14 ENCOUNTER — RESOLUTE PROFESSIONAL BILLING HOSPITAL PROF FEE (OUTPATIENT)
Dept: HOSPITALIST | Facility: MEDICAL CENTER | Age: 47
End: 2017-10-14
Payer: MEDICARE

## 2017-10-14 DIAGNOSIS — G43.711 INTRACTABLE CHRONIC MIGRAINE WITHOUT AURA AND WITH STATUS MIGRAINOSUS: ICD-10-CM

## 2017-10-14 LAB
ALBUMIN SERPL BCP-MCNC: 3.8 G/DL (ref 3.2–4.9)
ALBUMIN/GLOB SERPL: 1.4 G/DL
ALP SERPL-CCNC: 125 U/L (ref 30–99)
ALT SERPL-CCNC: 26 U/L (ref 2–50)
ANION GAP SERPL CALC-SCNC: 8 MMOL/L (ref 0–11.9)
AST SERPL-CCNC: 18 U/L (ref 12–45)
BACTERIA UR CULT: NORMAL
BASOPHILS # BLD AUTO: 0.7 % (ref 0–1.8)
BASOPHILS # BLD: 0.05 K/UL (ref 0–0.12)
BILIRUB SERPL-MCNC: 0.5 MG/DL (ref 0.1–1.5)
BUN SERPL-MCNC: 7 MG/DL (ref 8–22)
CALCIUM SERPL-MCNC: 8.8 MG/DL (ref 8.5–10.5)
CHLORIDE SERPL-SCNC: 111 MMOL/L (ref 96–112)
CO2 SERPL-SCNC: 21 MMOL/L (ref 20–33)
CREAT SERPL-MCNC: 0.5 MG/DL (ref 0.5–1.4)
CRP SERPL HS-MCNC: 0.16 MG/DL (ref 0–0.75)
EOSINOPHIL # BLD AUTO: 0 K/UL (ref 0–0.51)
EOSINOPHIL NFR BLD: 0 % (ref 0–6.9)
ERYTHROCYTE [DISTWIDTH] IN BLOOD BY AUTOMATED COUNT: 44 FL (ref 35.9–50)
ERYTHROCYTE [SEDIMENTATION RATE] IN BLOOD BY WESTERGREN METHOD: 11 MM/HOUR (ref 0–20)
GFR SERPL CREATININE-BSD FRML MDRD: >60 ML/MIN/1.73 M 2
GLOBULIN SER CALC-MCNC: 2.8 G/DL (ref 1.9–3.5)
GLUCOSE SERPL-MCNC: 80 MG/DL (ref 65–99)
HCT VFR BLD AUTO: 41.9 % (ref 37–47)
HGB BLD-MCNC: 13.9 G/DL (ref 12–16)
IMM GRANULOCYTES # BLD AUTO: 0.03 K/UL (ref 0–0.11)
IMM GRANULOCYTES NFR BLD AUTO: 0.4 % (ref 0–0.9)
LYMPHOCYTES # BLD AUTO: 1.28 K/UL (ref 1–4.8)
LYMPHOCYTES NFR BLD: 17.6 % (ref 22–41)
MCH RBC QN AUTO: 31.2 PG (ref 27–33)
MCHC RBC AUTO-ENTMCNC: 33.2 G/DL (ref 33.6–35)
MCV RBC AUTO: 93.9 FL (ref 81.4–97.8)
MONOCYTES # BLD AUTO: 0.31 K/UL (ref 0–0.85)
MONOCYTES NFR BLD AUTO: 4.3 % (ref 0–13.4)
NEUTROPHILS # BLD AUTO: 5.61 K/UL (ref 2–7.15)
NEUTROPHILS NFR BLD: 77 % (ref 44–72)
NRBC # BLD AUTO: 0 K/UL
NRBC BLD AUTO-RTO: 0 /100 WBC
PLATELET # BLD AUTO: 334 K/UL (ref 164–446)
PMV BLD AUTO: 9.8 FL (ref 9–12.9)
POTASSIUM SERPL-SCNC: 3.6 MMOL/L (ref 3.6–5.5)
PROT SERPL-MCNC: 6.6 G/DL (ref 6–8.2)
RBC # BLD AUTO: 4.46 M/UL (ref 4.2–5.4)
RHEUMATOID FACT SER IA-ACNC: <10 IU/ML (ref 0–14)
SIGNIFICANT IND 70042: NORMAL
SITE SITE: NORMAL
SODIUM SERPL-SCNC: 140 MMOL/L (ref 135–145)
SOURCE SOURCE: NORMAL
WBC # BLD AUTO: 7.3 K/UL (ref 4.8–10.8)

## 2017-10-14 PROCEDURE — 86140 C-REACTIVE PROTEIN: CPT

## 2017-10-14 PROCEDURE — 700111 HCHG RX REV CODE 636 W/ 250 OVERRIDE (IP): Performed by: EMERGENCY MEDICINE

## 2017-10-14 PROCEDURE — G0378 HOSPITAL OBSERVATION PER HR: HCPCS

## 2017-10-14 PROCEDURE — 93005 ELECTROCARDIOGRAM TRACING: CPT | Performed by: EMERGENCY MEDICINE

## 2017-10-14 PROCEDURE — 96376 TX/PRO/DX INJ SAME DRUG ADON: CPT

## 2017-10-14 PROCEDURE — 96375 TX/PRO/DX INJ NEW DRUG ADDON: CPT

## 2017-10-14 PROCEDURE — 85652 RBC SED RATE AUTOMATED: CPT

## 2017-10-14 PROCEDURE — 99220 PR INITIAL OBSERVATION CARE,LEVL III: CPT | Performed by: HOSPITALIST

## 2017-10-14 PROCEDURE — 700105 HCHG RX REV CODE 258: Performed by: EMERGENCY MEDICINE

## 2017-10-14 PROCEDURE — 80053 COMPREHEN METABOLIC PANEL: CPT

## 2017-10-14 PROCEDURE — 99285 EMERGENCY DEPT VISIT HI MDM: CPT

## 2017-10-14 PROCEDURE — 85025 COMPLETE CBC W/AUTO DIFF WBC: CPT

## 2017-10-14 PROCEDURE — 86812 HLA TYPING A B OR C: CPT

## 2017-10-14 PROCEDURE — 86431 RHEUMATOID FACTOR QUANT: CPT

## 2017-10-14 PROCEDURE — 96374 THER/PROPH/DIAG INJ IV PUSH: CPT

## 2017-10-14 RX ORDER — AMOXICILLIN 250 MG
2 CAPSULE ORAL 2 TIMES DAILY
Status: DISCONTINUED | OUTPATIENT
Start: 2017-10-15 | End: 2017-10-19 | Stop reason: HOSPADM

## 2017-10-14 RX ORDER — KETOROLAC TROMETHAMINE 30 MG/ML
60 INJECTION, SOLUTION INTRAMUSCULAR; INTRAVENOUS EVERY 12 HOURS PRN
Status: DISCONTINUED | OUTPATIENT
Start: 2017-10-14 | End: 2017-10-17

## 2017-10-14 RX ORDER — ASPIRIN 81 MG/1
81 TABLET, CHEWABLE ORAL 2 TIMES DAILY
Status: DISCONTINUED | OUTPATIENT
Start: 2017-10-14 | End: 2017-10-17

## 2017-10-14 RX ORDER — PROMETHAZINE HYDROCHLORIDE 25 MG/1
12.5-25 TABLET ORAL EVERY 4 HOURS PRN
Status: DISCONTINUED | OUTPATIENT
Start: 2017-10-14 | End: 2017-10-19 | Stop reason: HOSPADM

## 2017-10-14 RX ORDER — OMEPRAZOLE 20 MG/1
20 CAPSULE, DELAYED RELEASE ORAL DAILY
Status: DISCONTINUED | OUTPATIENT
Start: 2017-10-15 | End: 2017-10-19 | Stop reason: HOSPADM

## 2017-10-14 RX ORDER — HYDROMORPHONE HYDROCHLORIDE 2 MG/ML
2 INJECTION, SOLUTION INTRAMUSCULAR; INTRAVENOUS; SUBCUTANEOUS ONCE
Status: COMPLETED | OUTPATIENT
Start: 2017-10-14 | End: 2017-10-15

## 2017-10-14 RX ORDER — DIPHENHYDRAMINE HYDROCHLORIDE 50 MG/ML
50 INJECTION INTRAMUSCULAR; INTRAVENOUS EVERY 6 HOURS PRN
Status: DISCONTINUED | OUTPATIENT
Start: 2017-10-14 | End: 2017-10-19 | Stop reason: HOSPADM

## 2017-10-14 RX ORDER — CEPHALEXIN 500 MG/1
500 CAPSULE ORAL 4 TIMES DAILY
Status: ON HOLD | COMMUNITY
End: 2017-10-19

## 2017-10-14 RX ORDER — ASPIRIN 81 MG/1
81 TABLET ORAL EVERY MORNING
COMMUNITY

## 2017-10-14 RX ORDER — ACETAMINOPHEN 325 MG/1
650 TABLET ORAL EVERY 6 HOURS PRN
Status: DISCONTINUED | OUTPATIENT
Start: 2017-10-14 | End: 2017-10-17

## 2017-10-14 RX ORDER — ONDANSETRON 4 MG/1
4 TABLET, ORALLY DISINTEGRATING ORAL EVERY 4 HOURS PRN
Status: DISCONTINUED | OUTPATIENT
Start: 2017-10-14 | End: 2017-10-19 | Stop reason: HOSPADM

## 2017-10-14 RX ORDER — RISPERIDONE 0.5 MG/1
1 TABLET ORAL 2 TIMES DAILY
Status: ON HOLD | COMMUNITY
End: 2017-11-18

## 2017-10-14 RX ORDER — RISPERIDONE 0.5 MG/1
1 TABLET ORAL 2 TIMES DAILY
Status: DISCONTINUED | OUTPATIENT
Start: 2017-10-14 | End: 2017-10-19 | Stop reason: HOSPADM

## 2017-10-14 RX ORDER — PROMETHAZINE HYDROCHLORIDE 25 MG/1
12.5-25 SUPPOSITORY RECTAL EVERY 4 HOURS PRN
Status: DISCONTINUED | OUTPATIENT
Start: 2017-10-14 | End: 2017-10-19 | Stop reason: HOSPADM

## 2017-10-14 RX ORDER — SODIUM CHLORIDE 9 MG/ML
1000 INJECTION, SOLUTION INTRAVENOUS ONCE
Status: COMPLETED | OUTPATIENT
Start: 2017-10-14 | End: 2017-10-14

## 2017-10-14 RX ORDER — GABAPENTIN 300 MG/1
600 CAPSULE ORAL 3 TIMES DAILY
Status: DISCONTINUED | OUTPATIENT
Start: 2017-10-14 | End: 2017-10-19 | Stop reason: HOSPADM

## 2017-10-14 RX ORDER — BISACODYL 10 MG
10 SUPPOSITORY, RECTAL RECTAL
Status: DISCONTINUED | OUTPATIENT
Start: 2017-10-14 | End: 2017-10-19 | Stop reason: HOSPADM

## 2017-10-14 RX ORDER — POLYETHYLENE GLYCOL 3350 17 G/17G
1 POWDER, FOR SOLUTION ORAL
Status: DISCONTINUED | OUTPATIENT
Start: 2017-10-14 | End: 2017-10-19 | Stop reason: HOSPADM

## 2017-10-14 RX ORDER — ONDANSETRON 2 MG/ML
4 INJECTION INTRAMUSCULAR; INTRAVENOUS EVERY 4 HOURS PRN
Status: DISCONTINUED | OUTPATIENT
Start: 2017-10-14 | End: 2017-10-19 | Stop reason: HOSPADM

## 2017-10-14 RX ORDER — KETOROLAC TROMETHAMINE 30 MG/ML
30 INJECTION, SOLUTION INTRAMUSCULAR; INTRAVENOUS EVERY 6 HOURS PRN
Status: ON HOLD | COMMUNITY
End: 2017-11-18

## 2017-10-14 RX ORDER — SODIUM CHLORIDE 9 MG/ML
INJECTION, SOLUTION INTRAVENOUS CONTINUOUS
Status: DISCONTINUED | OUTPATIENT
Start: 2017-10-14 | End: 2017-10-19 | Stop reason: HOSPADM

## 2017-10-14 RX ORDER — OXYCODONE HYDROCHLORIDE 10 MG/1
10 TABLET ORAL EVERY 4 HOURS PRN
Status: DISCONTINUED | OUTPATIENT
Start: 2017-10-14 | End: 2017-10-15

## 2017-10-14 RX ORDER — DULOXETIN HYDROCHLORIDE 30 MG/1
60 CAPSULE, DELAYED RELEASE ORAL 2 TIMES DAILY
Status: DISCONTINUED | OUTPATIENT
Start: 2017-10-14 | End: 2017-10-19 | Stop reason: HOSPADM

## 2017-10-14 RX ADMIN — HYDROMORPHONE HYDROCHLORIDE 1 MG: 1 INJECTION, SOLUTION INTRAMUSCULAR; INTRAVENOUS; SUBCUTANEOUS at 19:04

## 2017-10-14 RX ADMIN — SODIUM CHLORIDE 1000 ML: 9 INJECTION, SOLUTION INTRAVENOUS at 19:04

## 2017-10-14 ASSESSMENT — ENCOUNTER SYMPTOMS
DIZZINESS: 0
DOUBLE VISION: 0
COUGH: 0
VOMITING: 0
DEPRESSION: 1
ORTHOPNEA: 0
DIARRHEA: 0
FEVER: 0
BLURRED VISION: 0
MYALGIAS: 1
ABDOMINAL PAIN: 0
CHILLS: 0
HEADACHES: 1

## 2017-10-14 ASSESSMENT — PAIN SCALES - GENERAL: PAINLEVEL_OUTOF10: 7

## 2017-10-14 NOTE — ED NOTES
"Patient states \"Im having some type of autoimmune flare\"  She is being seen by specialists for this.  Complaints of migraine, hand swelling and generalized body aches.  Taking medications with little relief.    "

## 2017-10-15 ENCOUNTER — PATIENT OUTREACH (OUTPATIENT)
Dept: HEALTH INFORMATION MANAGEMENT | Facility: OTHER | Age: 47
End: 2017-10-15

## 2017-10-15 PROBLEM — Z87.2 HISTORY OF SKIN ULCER: Status: ACTIVE | Noted: 2017-10-15

## 2017-10-15 LAB
APPEARANCE UR: CLEAR
BILIRUB UR QL STRIP.AUTO: NEGATIVE
COLOR UR: NORMAL
GLUCOSE UR STRIP.AUTO-MCNC: NEGATIVE MG/DL
KETONES UR STRIP.AUTO-MCNC: NEGATIVE MG/DL
LEUKOCYTE ESTERASE UR QL STRIP.AUTO: NEGATIVE
MICRO URNS: NORMAL
NITRITE UR QL STRIP.AUTO: NEGATIVE
PH UR STRIP.AUTO: 5.5 [PH]
PROT UR QL STRIP: NEGATIVE MG/DL
RBC UR QL AUTO: NEGATIVE
SP GR UR STRIP.AUTO: 1.01
UROBILINOGEN UR STRIP.AUTO-MCNC: 0.2 MG/DL

## 2017-10-15 PROCEDURE — 700105 HCHG RX REV CODE 258: Performed by: HOSPITALIST

## 2017-10-15 PROCEDURE — G0378 HOSPITAL OBSERVATION PER HR: HCPCS

## 2017-10-15 PROCEDURE — 700111 HCHG RX REV CODE 636 W/ 250 OVERRIDE (IP): Performed by: INTERNAL MEDICINE

## 2017-10-15 PROCEDURE — 700111 HCHG RX REV CODE 636 W/ 250 OVERRIDE (IP): Performed by: HOSPITALIST

## 2017-10-15 PROCEDURE — 700102 HCHG RX REV CODE 250 W/ 637 OVERRIDE(OP): Performed by: INTERNAL MEDICINE

## 2017-10-15 PROCEDURE — A9270 NON-COVERED ITEM OR SERVICE: HCPCS | Performed by: HOSPITALIST

## 2017-10-15 PROCEDURE — 700102 HCHG RX REV CODE 250 W/ 637 OVERRIDE(OP): Performed by: HOSPITALIST

## 2017-10-15 PROCEDURE — 99226 PR SUBSEQUENT OBSERVATION CARE,LEVEL III: CPT | Performed by: INTERNAL MEDICINE

## 2017-10-15 PROCEDURE — 96376 TX/PRO/DX INJ SAME DRUG ADON: CPT

## 2017-10-15 PROCEDURE — A9270 NON-COVERED ITEM OR SERVICE: HCPCS | Performed by: INTERNAL MEDICINE

## 2017-10-15 PROCEDURE — 81003 URINALYSIS AUTO W/O SCOPE: CPT

## 2017-10-15 RX ORDER — OXYCODONE HYDROCHLORIDE 10 MG/1
10 TABLET ORAL
Status: DISCONTINUED | OUTPATIENT
Start: 2017-10-15 | End: 2017-10-15

## 2017-10-15 RX ORDER — ACETAMINOPHEN 325 MG/1
650 TABLET ORAL EVERY 6 HOURS PRN
Status: DISCONTINUED | OUTPATIENT
Start: 2017-10-15 | End: 2017-10-19 | Stop reason: HOSPADM

## 2017-10-15 RX ORDER — OXYCODONE HYDROCHLORIDE 10 MG/1
10 TABLET ORAL
Status: DISCONTINUED | OUTPATIENT
Start: 2017-10-15 | End: 2017-10-18

## 2017-10-15 RX ORDER — OXYCODONE HYDROCHLORIDE 5 MG/1
5 TABLET ORAL
Status: DISCONTINUED | OUTPATIENT
Start: 2017-10-15 | End: 2017-10-18

## 2017-10-15 RX ORDER — OXYCODONE HYDROCHLORIDE 5 MG/1
5 TABLET ORAL
Status: DISCONTINUED | OUTPATIENT
Start: 2017-10-15 | End: 2017-10-15

## 2017-10-15 RX ADMIN — OXYCODONE HYDROCHLORIDE 10 MG: 10 TABLET ORAL at 23:37

## 2017-10-15 RX ADMIN — SODIUM CHLORIDE: 9 INJECTION, SOLUTION INTRAVENOUS at 09:11

## 2017-10-15 RX ADMIN — ASPIRIN 81 MG: 81 TABLET, CHEWABLE ORAL at 21:53

## 2017-10-15 RX ADMIN — RISPERIDONE 1 MG: 1 TABLET, FILM COATED ORAL at 23:37

## 2017-10-15 RX ADMIN — SODIUM CHLORIDE: 9 INJECTION, SOLUTION INTRAVENOUS at 19:46

## 2017-10-15 RX ADMIN — GABAPENTIN 600 MG: 300 CAPSULE ORAL at 01:53

## 2017-10-15 RX ADMIN — HYDROMORPHONE HYDROCHLORIDE 0.5 MG: 1 INJECTION, SOLUTION INTRAMUSCULAR; INTRAVENOUS; SUBCUTANEOUS at 10:32

## 2017-10-15 RX ADMIN — OXYCODONE HYDROCHLORIDE 10 MG: 10 TABLET ORAL at 19:43

## 2017-10-15 RX ADMIN — RISPERIDONE 1 MG: 1 TABLET, FILM COATED ORAL at 01:54

## 2017-10-15 RX ADMIN — OXYCODONE HYDROCHLORIDE 10 MG: 10 TABLET ORAL at 16:33

## 2017-10-15 RX ADMIN — DIPHENHYDRAMINE HYDROCHLORIDE 50 MG: 50 INJECTION, SOLUTION INTRAMUSCULAR; INTRAVENOUS at 08:06

## 2017-10-15 RX ADMIN — KETOROLAC TROMETHAMINE 60 MG: 30 INJECTION, SOLUTION INTRAMUSCULAR at 15:13

## 2017-10-15 RX ADMIN — KETOROLAC TROMETHAMINE 60 MG: 30 INJECTION, SOLUTION INTRAMUSCULAR at 02:02

## 2017-10-15 RX ADMIN — OMEPRAZOLE 20 MG: 20 CAPSULE, DELAYED RELEASE ORAL at 09:03

## 2017-10-15 RX ADMIN — DIPHENHYDRAMINE HYDROCHLORIDE 50 MG: 50 INJECTION, SOLUTION INTRAMUSCULAR; INTRAVENOUS at 02:03

## 2017-10-15 RX ADMIN — HYDROMORPHONE HYDROCHLORIDE 1 MG: 1 INJECTION, SOLUTION INTRAMUSCULAR; INTRAVENOUS; SUBCUTANEOUS at 17:44

## 2017-10-15 RX ADMIN — DIPHENHYDRAMINE HYDROCHLORIDE 50 MG: 50 INJECTION, SOLUTION INTRAMUSCULAR; INTRAVENOUS at 21:52

## 2017-10-15 RX ADMIN — RISPERIDONE 1 MG: 1 TABLET, FILM COATED ORAL at 12:38

## 2017-10-15 RX ADMIN — OXYCODONE HYDROCHLORIDE 10 MG: 10 TABLET ORAL at 04:29

## 2017-10-15 RX ADMIN — SODIUM CHLORIDE: 9 INJECTION, SOLUTION INTRAVENOUS at 01:54

## 2017-10-15 RX ADMIN — ASPIRIN 81 MG: 81 TABLET, CHEWABLE ORAL at 01:53

## 2017-10-15 RX ADMIN — OXYCODONE HYDROCHLORIDE 10 MG: 10 TABLET ORAL at 12:37

## 2017-10-15 RX ADMIN — HYDROMORPHONE HYDROCHLORIDE 1 MG: 1 INJECTION, SOLUTION INTRAMUSCULAR; INTRAVENOUS; SUBCUTANEOUS at 21:52

## 2017-10-15 RX ADMIN — DULOXETINE HYDROCHLORIDE 60 MG: 60 CAPSULE, DELAYED RELEASE ORAL at 01:53

## 2017-10-15 RX ADMIN — DULOXETINE HYDROCHLORIDE 60 MG: 60 CAPSULE, DELAYED RELEASE ORAL at 12:37

## 2017-10-15 RX ADMIN — ASPIRIN 81 MG: 81 TABLET, CHEWABLE ORAL at 09:03

## 2017-10-15 RX ADMIN — GABAPENTIN 600 MG: 300 CAPSULE ORAL at 15:09

## 2017-10-15 RX ADMIN — GABAPENTIN 600 MG: 300 CAPSULE ORAL at 21:53

## 2017-10-15 RX ADMIN — OXYCODONE HYDROCHLORIDE 10 MG: 10 TABLET ORAL at 09:34

## 2017-10-15 RX ADMIN — HYDROMORPHONE HYDROCHLORIDE 2 MG: 2 INJECTION INTRAMUSCULAR; INTRAVENOUS; SUBCUTANEOUS at 01:08

## 2017-10-15 RX ADMIN — HYDROMORPHONE HYDROCHLORIDE 1 MG: 1 INJECTION, SOLUTION INTRAMUSCULAR; INTRAVENOUS; SUBCUTANEOUS at 13:37

## 2017-10-15 RX ADMIN — DIPHENHYDRAMINE HYDROCHLORIDE 50 MG: 50 INJECTION, SOLUTION INTRAMUSCULAR; INTRAVENOUS at 15:03

## 2017-10-15 RX ADMIN — DULOXETINE HYDROCHLORIDE 60 MG: 60 CAPSULE, DELAYED RELEASE ORAL at 23:37

## 2017-10-15 RX ADMIN — GABAPENTIN 600 MG: 300 CAPSULE ORAL at 06:58

## 2017-10-15 ASSESSMENT — LIFESTYLE VARIABLES
HOW MANY TIMES IN THE PAST YEAR HAVE YOU HAD 5 OR MORE DRINKS IN A DAY: 0
TOTAL SCORE: 0
ALCOHOL_USE: YES
EVER FELT BAD OR GUILTY ABOUT YOUR DRINKING: NO
HAVE YOU EVER FELT YOU SHOULD CUT DOWN ON YOUR DRINKING: NO
ON A TYPICAL DAY WHEN YOU DRINK ALCOHOL HOW MANY DRINKS DO YOU HAVE: 1
TOTAL SCORE: 0
AVERAGE NUMBER OF DAYS PER WEEK YOU HAVE A DRINK CONTAINING ALCOHOL: 0
TOTAL SCORE: 0
EVER_SMOKED: NEVER
CONSUMPTION TOTAL: NEGATIVE
HAVE PEOPLE ANNOYED YOU BY CRITICIZING YOUR DRINKING: NO
EVER HAD A DRINK FIRST THING IN THE MORNING TO STEADY YOUR NERVES TO GET RID OF A HANGOVER: NO

## 2017-10-15 ASSESSMENT — PAIN SCALES - GENERAL
PAINLEVEL_OUTOF10: 9
PAINLEVEL_OUTOF10: 8
PAINLEVEL_OUTOF10: 7
PAINLEVEL_OUTOF10: 6
PAINLEVEL_OUTOF10: 6
PAINLEVEL_OUTOF10: 9
PAINLEVEL_OUTOF10: 9
PAINLEVEL_OUTOF10: 8
PAINLEVEL_OUTOF10: 6

## 2017-10-15 ASSESSMENT — ENCOUNTER SYMPTOMS
SHORTNESS OF BREATH: 0
BLOOD IN STOOL: 0
DIZZINESS: 0
WEAKNESS: 0
FEVER: 0
EYE REDNESS: 0
CONSTIPATION: 0
NAUSEA: 0
LOSS OF CONSCIOUSNESS: 0
FOCAL WEAKNESS: 0
FALLS: 0
COUGH: 0
DIARRHEA: 0
MYALGIAS: 1
EYE PAIN: 0
INSOMNIA: 0
TREMORS: 0
SEIZURES: 0
ABDOMINAL PAIN: 0
HEMOPTYSIS: 0
VOMITING: 0
NERVOUS/ANXIOUS: 0
HEADACHES: 0
PALPITATIONS: 0
CHILLS: 0
WHEEZING: 0

## 2017-10-15 ASSESSMENT — PATIENT HEALTH QUESTIONNAIRE - PHQ9
SUM OF ALL RESPONSES TO PHQ9 QUESTIONS 1 AND 2: 4
2. FEELING DOWN, DEPRESSED, IRRITABLE, OR HOPELESS: NEARLY EVERY DAY
3. TROUBLE FALLING OR STAYING ASLEEP OR SLEEPING TOO MUCH: MORE THAN HALF THE DAYS
5. POOR APPETITE OR OVEREATING: NEARLY EVERY DAY
SUM OF ALL RESPONSES TO PHQ QUESTIONS 1-9: 18
9. THOUGHTS THAT YOU WOULD BE BETTER OFF DEAD, OR OF HURTING YOURSELF: NOT AT ALL
4. FEELING TIRED OR HAVING LITTLE ENERGY: NEARLY EVERY DAY
6. FEELING BAD ABOUT YOURSELF - OR THAT YOU ARE A FAILURE OR HAVE LET YOURSELF OR YOUR FAMILY DOWN: NEARLY EVERY DAY
1. LITTLE INTEREST OR PLEASURE IN DOING THINGS: SEVERAL DAYS
8. MOVING OR SPEAKING SO SLOWLY THAT OTHER PEOPLE COULD HAVE NOTICED. OR THE OPPOSITE, BEING SO FIGETY OR RESTLESS THAT YOU HAVE BEEN MOVING AROUND A LOT MORE THAN USUAL: NOT AT ALL
7. TROUBLE CONCENTRATING ON THINGS, SUCH AS READING THE NEWSPAPER OR WATCHING TELEVISION: NEARLY EVERY DAY

## 2017-10-15 NOTE — PROGRESS NOTES
[facility] Hospitalist Progress Note    Date of Service: 10/15/2017    Chief Complaint  47 y.o. female admitted 10/14/2017 with Migraine; Hand Swelling; and Body Aches        Interval Problem Update  Stillhaving pain and hand swelling. Asking for more Dilaudid.  at bedside.    Consultants/Specialty      Disposition  Home        Review of Systems   Constitutional: Negative for chills and fever.   HENT: Negative for congestion, hearing loss and nosebleeds.    Eyes: Negative for pain and redness.   Respiratory: Negative for cough, hemoptysis, shortness of breath and wheezing.    Cardiovascular: Negative for chest pain and palpitations.   Gastrointestinal: Negative for abdominal pain, blood in stool, constipation, diarrhea, nausea and vomiting.   Genitourinary: Negative for dysuria, frequency and hematuria.   Musculoskeletal: Positive for joint pain and myalgias. Negative for falls.   Skin: Positive for rash (ulcers, now scabs).   Neurological: Negative for dizziness, tremors, focal weakness, seizures, loss of consciousness, weakness and headaches.   Psychiatric/Behavioral: The patient is not nervous/anxious and does not have insomnia.    All other systems reviewed and are negative.     Physical Exam  Laboratory/Imaging   Hemodynamics  Temp (24hrs), Av.6 °C (97.9 °F), Min:36.3 °C (97.4 °F), Max:37.2 °C (99 °F)   Temperature: 36.3 °C (97.4 °F)  Pulse  Av.9  Min: 79  Max: 119    Blood Pressure: 107/63, NIBP: 129/83      Respiratory      Respiration: 16, Pulse Oximetry: 96 %             Fluids  No intake or output data in the 24 hours ending 10/15/17 1348    Nutrition  Orders Placed This Encounter   Procedures   • Diet Order     Standing Status:   Standing     Number of Occurrences:   1     Order Specific Question:   Diet:     Answer:   Regular [1]     Physical Exam   Constitutional: She appears well-developed and well-nourished.   HENT:   Head: Normocephalic and atraumatic.   Eyes: Conjunctivae and EOM are  normal. No scleral icterus.   Neck: Normal range of motion. Neck supple.   Cardiovascular: Normal rate and regular rhythm.  Exam reveals no gallop and no friction rub.    No murmur heard.  Pulmonary/Chest: Effort normal and breath sounds normal. No respiratory distress. She has no wheezes. She has no rales.   Abdominal: Soft. Bowel sounds are normal. She exhibits no distension. There is no tenderness. There is no rebound and no guarding.   Musculoskeletal: She exhibits edema (Bilateral hand swelling, chronic) and tenderness (nonspecific, diffuse).   Neurological: She is alert.   Headaches   Skin: Skin is warm.   Psychiatric: She has a normal mood and affect. Her behavior is normal.       Recent Labs      10/14/17   1940   WBC  7.3   RBC  4.46   HEMOGLOBIN  13.9   HEMATOCRIT  41.9   MCV  93.9   MCH  31.2   MCHC  33.2*   RDW  44.0   PLATELETCT  334   MPV  9.8     Recent Labs      10/14/17   1940   SODIUM  140   POTASSIUM  3.6   CHLORIDE  111   CO2  21   GLUCOSE  80   BUN  7*   CREATININE  0.50   CALCIUM  8.8                      Assessment/Plan     * Seronegative rheumatoid arthritis (CMS-[facility])   Assessment & Plan    Presented with headache but this is chronic, primarily she is concerned more about her worsening arthralgias and myalgias.  Swollen hands L>R; had biopsy on scabs  She has seronegative rheumatoid arthritis and follows Dr. Andrade, Rheumatology, [phone] as well as Dr. Mahoney, Pain clinic.  She has been on steroids before and has not been started on DMARDs yet; she mentions she is planned for liver biipsy  Obtain ESR and CRP to see if this is a flare as if it is will consider steroids and contact Dr. Andrade.  ESR CRP negative. She and her  mentioned Dr. Andrade also referred them to a hematologist. Will consult Dr. Andrade, Rheumatology 9/15 per patient request.        History of skin ulcer   Assessment & Plan    Being followed in the outpatient by Dr. Andrade  Completed a 5 day course of Keflex  Had biopsy it  looked like the cause of the ulcer arterial side with thrombus with epidermal necrosis.  No actual vasculitis. Per Dr. Andrade's notes. Her anticardiolipin and beta 2 glycoprotein were negative in 9/15/2017        Migraine   Assessment & Plan    Chronic headaches, she said this is nothing new  Received botox and nerve block; followed by Dr. Mahoney, Pain clinic.  Pain medications and bowel regimen        I spent 38 minutes, reviewing the chart, notes, vitals, labs, imaging, ordering labs, evaluating Enedelia Pang for assessment, enacting the plan above. 50% of the time was spent in counseling Enedelia Pang and , answering questions. . Medical decision making is therefore complex. Time was devoted to counseling and coordinating care including review of records, pertinent lab data and studies, as well as discussing diagnostic evaluation and work up, planned therapeutic interventions and future disposition of care. Where indicated, the assessment and plan reflect discussion of patient with consultants, other healthcare providers, family members, and additional research needed to obtain further information in formulating the plan of care for Enedelia Pang.       DVT prophylaxis - mechanical:  SCDs

## 2017-10-15 NOTE — PROGRESS NOTES
"Pt on isolation for MRSA.   at bedside not wearing gown.  Pt educated on importance of wearing PPE.   states \"Sudha been taking care of her at home.  If I'm going to catch something its not going to happen from here.\"   understands to use the restroom in the pts room and also understands how important hand hygiene is.   verbalizes agreement to wash hands and avoid public areas.  "

## 2017-10-15 NOTE — ED PROVIDER NOTES
ED Provider Note    CHIEF COMPLAINT  Chief Complaint   Patient presents with   • Migraine   • Hand Swelling   • Body Aches       HPI  Enedelia Pang is a 47 y.o. female With history of pseudotumor cerebri with  shunt in place, intractable migraines, chronic pain syndrome and rheumatologic disorder of unknown etiology, currently undergoing workup here with ongoing pain. Patient reports that she is having a flare of her chronic pain. She's been seen in the emergency department 3 times in the last week. She's had CT scans of reporting within the last 72 hours which were normal. Patient currently being treated for UTI but denies dysuria hematuria or symptoms otherwise. Patient reports ongoing chronic diarrhea which has not changed, she denies any associated abdominal pain. Patient endorses diffuse extremity cramping and pain. She reports more standing history of multiple ulcerating skin lesions which recently underwent biopsy. Patient also reports chronic migraine which is not changed from prior migraines. Migraine is pulsating. No associated weakness or numbness. No associated neck pain or stiffness. No vomiting. Patient reports long-standing swelling of bilateral hands which is of unknown etiology, she is seeing physical therapy for this. Patient reports that she is planning on having further workup at [facility] later this year    REVIEW OF SYSTEMS  Review of Systems   Constitutional: Positive for malaise/fatigue. Negative for chills and fever.   Eyes: Negative for blurred vision and double vision.   Respiratory: Negative for cough.    Cardiovascular: Negative for chest pain and orthopnea.   Gastrointestinal: Negative for abdominal pain, diarrhea and vomiting.   Genitourinary: Negative for dysuria and urgency.   Musculoskeletal: Positive for myalgias.   Skin: Negative for rash.   Neurological: Positive for headaches. Negative for dizziness.   Psychiatric/Behavioral: Positive for depression.     See HPI for  "further details. All other systems are negative.     PAST MEDICAL HISTORY   has a past medical history of Allergy, unspecified not elsewhere classified; Anemia (10/05/2017); Anxiety; autoimmune; Depression; H/O Clostridium difficile infection; MRSA infection; Hydrocephalus; Migraine with aura, with intractable migraine, so stated, without mention of status migrainosus; Pituitary abnormality (CMS-[facility]); and Stroke (CMS-HCC).    SOCIAL HISTORY  Social History     Social History Main Topics   • Smoking status: Never Smoker   • Smokeless tobacco: Never Used   • Alcohol use Yes      Comment: Rare   • Drug use: No   • Sexual activity: Not on file       SURGICAL HISTORY   has a past surgical history that includes cholecystectomy; tonsillectomy; appendectomy; tubal ligation; other; other neurological surg; irrigation & debridement neuro (N/A, 6/28/2015); lumbar exploration (N/A, 8/31/2015); spinal cord stimulator (12/19/2016); and  shunt insertion (5/31/2017).    CURRENT MEDICATIONS  Home Medications    **Home medications have not yet been reviewed for this encounter**         ALLERGIES  Allergies   Allergen Reactions   • Prochlorperazine Swelling     Tongue swelling. Reaction as a teen. (compazine)  Tolerated Phenergan on 02/24/15   • Sumatriptan Unspecified     Historical=\"Heart stops.\" Reaction  between 1995 to 1997   • Vancomycin Shortness of Breath and Rash     Reaction in 2005.  D/W patient 8/31/15 - tolerated loading dose of vancomycin administered on 8/30/15 with some itching to chest with decreased infusion rate. Red Man Syndrome       PHYSICAL EXAM  Physical Exam   Constitutional: She is oriented to person, place, and time. She appears well-developed and well-nourished.   HENT:   Head: Normocephalic and atraumatic.   Eyes: Conjunctivae are normal. Pupils are equal, round, and reactive to light.       No Papilledema   Neck: Normal range of motion. Neck supple.   Cardiovascular: Regular rhythm.  Exam reveals no " gallop and no friction rub.    No murmur heard.  Tachycardic   Pulmonary/Chest: Effort normal and breath sounds normal. No respiratory distress. She has no wheezes. She has no rales.   Abdominal: Soft. Bowel sounds are normal. She exhibits no distension and no mass. There is no tenderness. There is no rebound and no guarding.   Musculoskeletal:   All joints with full range of motion   Neurological: She is alert and oriented to person, place, and time.   Skin: Skin is warm and dry.   Multiple scattered ulcerated lesions which are status post biopsy and sutured closed, no surrounding induration or erythema. Bilateral hands with mild hyperemia and edema without any associated cellulitis induration or fluctuance. Distal pulses are 2+ cap refill is instantaneous     Results for orders placed or performed during the hospital encounter of 10/14/17   CBC WITH DIFFERENTIAL   Result Value Ref Range    WBC 7.3 4.8 - 10.8 K/uL    RBC 4.46 4.20 - 5.40 M/uL    Hemoglobin 13.9 12.0 - 16.0 g/dL    Hematocrit 41.9 37.0 - 47.0 %    MCV 93.9 81.4 - 97.8 fL    MCH 31.2 27.0 - 33.0 pg    MCHC 33.2 (L) 33.6 - 35.0 g/dL    RDW 44.0 35.9 - 50.0 fL    Platelet Count 334 164 - 446 K/uL    MPV 9.8 9.0 - 12.9 fL    Neutrophils-Polys 77.00 (H) 44.00 - 72.00 %    Lymphocytes 17.60 (L) 22.00 - 41.00 %    Monocytes 4.30 0.00 - 13.40 %    Eosinophils 0.00 0.00 - 6.90 %    Basophils 0.70 0.00 - 1.80 %    Immature Granulocytes 0.40 0.00 - 0.90 %    Nucleated RBC 0.00 /100 WBC    Neutrophils (Absolute) 5.61 2.00 - 7.15 K/uL    Lymphs (Absolute) 1.28 1.00 - 4.80 K/uL    Monos (Absolute) 0.31 0.00 - 0.85 K/uL    Eos (Absolute) 0.00 0.00 - 0.51 K/uL    Baso (Absolute) 0.05 0.00 - 0.12 K/uL    Immature Granulocytes (abs) 0.03 0.00 - 0.11 K/uL    NRBC (Absolute) 0.00 K/uL   CMP   Result Value Ref Range    Sodium 140 135 - 145 mmol/L    Potassium 3.6 3.6 - 5.5 mmol/L    Chloride 111 96 - 112 mmol/L    Co2 21 20 - 33 mmol/L    Anion Gap 8.0 0.0 - 11.9     Glucose 80 65 - 99 mg/dL    Bun 7 (L) 8 - 22 mg/dL    Creatinine 0.50 0.50 - 1.40 mg/dL    Calcium 8.8 8.5 - 10.5 mg/dL    AST(SGOT) 18 12 - 45 U/L    ALT(SGPT) 26 2 - 50 U/L    Alkaline Phosphatase 125 (H) 30 - 99 U/L    Total Bilirubin 0.5 0.1 - 1.5 mg/dL    Albumin 3.8 3.2 - 4.9 g/dL    Total Protein 6.6 6.0 - 8.2 g/dL    Globulin 2.8 1.9 - 3.5 g/dL    A-G Ratio 1.4 g/dL   CRP QUANTITIVE (NON-CARDIAC)   Result Value Ref Range    Stat C-Reactive Protein 0.16 0.00 - 0.75 mg/dL   WESTERGREN SED RATE   Result Value Ref Range    Sed Rate Westergren 11 0 - 20 mm/hour   RHEUMATOID ARTHRITIS FACTOR   Result Value Ref Range    Rheumatoid Factor -Neph- <10 0 - 14 IU/mL   ESTIMATED GFR   Result Value Ref Range    GFR If African American >60 >60 mL/min/1.73 m 2    GFR If Non African American >60 >60 mL/min/1.73 m 2   EKG (ER)   Result Value Ref Range    Report       [facility] Emergency Dept.    Test Date:  2017-10-14  Pt Name:    BRIGID DAWSON                 Department: ER  MRN:        [id number]                      Room:       [facility]  Gender:     F                            Technician: [id number]  :        1970                   Requested By:NISHI BAH  Order #:    [id number]                    Reading MD:    Measurements  Intervals                                Axis  Rate:       85                           P:          50  AL:         133                          QRS:        -11  QRSD:       84                           T:          6  QT:         384  QTc:        457    Interpretive Statements  SINUS RHYTHM  BORDERLINE T ABNORMALITIES, ANTERIOR LEADS  Compared to ECG 2017 18:47:58  Sinus tachycardia no longer present  T-wave abnormality still present           COURSE & MEDICAL DECISION MAKING  Pertinent Labs & Imaging studies reviewed. (See chart for details)  Patient with ongoing symptoms suggestive of ongoing unresolved rheumatologic issue. She reports that her pain regimen at home was  not sufficient and she is requesting admission for ongoing pain control as she feels her symptoms are not being adequately addressed at home. Patient with ongoing chronic migraine that is unchanged. She's had normal head CT and the last 72 hours. Patient has normal neurologic exam and no papilledema on my exam. Given patient appears well as had normal head CT and no papilledema do not believe that an emergent CT is currently indicated in this patient and I worry that repeatedly checking CTs on this patient is a very high risk of developing malignancy in the future. We have discussed this with patient and she is palpable with this plan. We'll treat pain, sending basic labs including inflammatory markers ESR CRP rheumatologic markers such as rheumatoid factor and a also HLA-B27 given the skin findings are consistent mostly with erythema nodosum  Laboratory results are unremarkable. I discussed case with hospitalist who will manage patient from floor.   Pain is improved after Dilaudid.  FINAL IMPRESSION  1. Intractable pain, chronic pain         Electronically signed by: Dillan Saldana, 10/14/2017 6:40 PM

## 2017-10-15 NOTE — PROGRESS NOTES
Assumed patient care. Report received from CAROLINA López. Pt A&Ox4.  Respirations even, unlabored on room air.   Pt reports 8/10 migraine, medicated per MAR.  Pt complains  of 9/10 generalized pain, medicated per MAR. IVF infusing per MAR.    Bed in locked, lowest position.  Call light within reach.  Pt updated on POC, updated communication board.  Family at bedside. Needs met, will continue to monitor.  Hot meal provided

## 2017-10-15 NOTE — ASSESSMENT & PLAN NOTE
Being followed in the outpatient by Dr. 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  No actual vasculitis. Per Dr. Andrade's notes. Her anticardiolipin and beta 2 glycoprotein were negative in 9/15/2017  Healing overall, continue wound care, no acute issues.

## 2017-10-15 NOTE — ASSESSMENT & PLAN NOTE
Presented with headache but this is chronic, primarily she is concerned more about her worsening arthralgias and myalgias.  Swollen hands L>R; had biopsy on scabs  She has seronegative rheumatoid arthritis and follows Dr. Andrade, Rheumatology, [id number] as well as Dr. Mahoney, Pain clinic.  She has been on steroids before and has not been started on DMARDs yet; she mentions she is planned for liver biipsy  Obtain ESR and CRP to see if this is a flare as if it is will consider steroids and contact Dr. Andrade.  ESR CRP negative. She and her  mentioned Dr. Andrade also referred them to a hematologist. Will consult Dr. Andrade, Rheumatology 9/15 per patient request.  10/16. I spoke with Dr. Andrade, Rheumatologist. Reviewed biopsy, positive for undetermined vasculopathy. She mentioned antiphospholipid panel negative. I ordered protein S, C and antithrombin 3 at her request. AT this point because it is not a vasculitis on biopsy, NO steroids for now. I ordered CT oif hands and wrists. She will see her. I spoke with Dr. Mahoney. He mentioned they are simply taking care of her headaches and has an occipital nerve block scheduled. She had received botox. For her other complaints of pain, he thinks it might be overuse of analgesics. At this point, he recommended tapering him off IV narcotics and simply follow up to his clinic.    Pending coagulation workup at this point. Will defer to outpatient heme/onc follow up

## 2017-10-15 NOTE — ASSESSMENT & PLAN NOTE
Chronic headaches, not any worse than her usual  Per Dr. Mahoney. Recommended transitioning off of narcotics  Continue with Toradol and Tylenol

## 2017-10-15 NOTE — PROGRESS NOTES
S/W Kyrie, pharmacist.  Pt with interest in pneumovax and flu vaccine before discharge.  Pt is having an autoimmune disease flair.  Per Kyrie, hold off on PRN vaccines at this time and do @ discharge.

## 2017-10-15 NOTE — H&P
"CHIEF COMPLAINT:  Migraine, body aches.    HISTORY OF PRESENT ILLNESS:  This is a 47-year-old female who has complex   autoimmune history.  She is followed by Dr. Aimee Andrade of rheumatology and has   been diagnosed with seronegative rheumatoid arthritis.  She also has a history   of hydrocephalus and has a  shunt in place followed by Dr. Drew Berry for   this.  She has recently been worked up with biopsies of skin lesion, then she   was told that she had some inflammation in my arteries, when I asked her if   she was told that she had vasculitis, she tells me yes.    Patient has long-term history of being maintained on prednisone and   methotrexate.  She had some elevation in her LFTs recently, and after liver   biopsy, it was thought that this was due to her methotrexate, which was   stopped.  She was also transitioned off of her prednisone, and she has been   off of all autoimmune modulating agents now, she reports to me for about a   month.    Patient reports increasing pain and headache, which began about 2 weeks ago.    These symptoms have been getting substantially worse.  She reports her   migraine is similar to normal, but more intense.  She has flashes in her   visual fields, which are similar to where that she has with her usual   migraine, these are slightly different and more intense.  She reports body   aches all over.  She states that it feels like her muscles are \"swimming in   acid.\"  She presented tonight as her pain is out of control.    REVIEW OF SYSTEMS:  Positive as noted above, otherwise positive for swelling   in both hands, which has been evaluated with biopsies as noted.    PREVIOUS MEDICAL HISTORY:  1.  Seronegative rheumatoid arthritis as noted.  2.  History of hydrocephalus with  shunt in place followed by Dr. Drew Berry.  3.  Chronic diarrhea followed by GI consultants.  4.  Pseudotumor cerebri.  5.  Distant history of CVA.    MEDICATIONS:  1.  Aspirin 81 mg p.o. q. day.  2.  " Benadryl 50 mg IM as needed for migraine.  3.  Cymbalta 60 mg p.o. q. day.  4.  Gabapentin 600 mg t.i.d.  5.  Toradol 60 mg IM for migraine.  6.  Multivitamin.  7.  Omeprazole 20 mg p.o. q. day.  8.  Oxycodone 10 mg every 6 hours for pain.  9.  Risperdal 0.5 mg at bedtime.    ALLERGIES:  1.  PROCHLORPERAZINE, WHICH CAUSES CLAMPING OF HER JAW.  2.  SUMATRIPTAN.  3.  VANCOMYCIN.    SOCIAL HISTORY:  Patient does smoke.  She drinks alcohol only rarely.    SURGICAL HISTORY:  1.   shunt placement.  2.  Spinal cord stimulator.  3.  Lumbar exploration.  4.  Tubal ligation.  5.  Appendectomy.  6.  Tonsillectomy.  7.  Cholecystectomy.    FAMILY HISTORY:  Reviewed, but not relevant to presentation.    PHYSICAL EXAMINATION:  VITAL SIGNS:  Temperature 37.2, heart rate 79, respiratory rate 16, /74,   satting the upper 90s on room air.  GENERAL:  The patient is awake, alert.  She is in no acute distress.  HEENT:  Head is normocephalic and atraumatic.  Mucous membranes are moist.    Sclerae and conjunctivae benign.  NECK:  Trachea is in the midline.  There is no JVD or bruits.  RESPIRATORY:  Clear to auscultation bilaterally.  CARDIAC:  Regular rate and rhythm.  No murmurs, rubs or clicks.  ABDOMEN:  Soft.  No guarding, rebound, hepatosplenomegaly, or masses.  Minimal   diffuse tenderness to palpation; however, the patient states this is normal   for her.  No peritoneal findings.  EXTREMITIES:  Trace edema.  No clubbing or cyanosis.  Peripheral pulses   maintained and symmetric.  SKIN:  No rashes are appreciated.  NEUROLOGIC:  Patient is alert and oriented and her exam is nonfocal.  PSYCHIATRIC:  Mood and affect are appropriate.  Hygiene neat and clean.    LABORATORY DATA:  White count 7.3, hemoglobin 13.9, platelet count 9.8.    Sodium 140, potassium 3.6, BUN 7, creatinine 0.5.  LFTs are normal.  Sed rate   is 11.    ASSESSMENT AND PLAN:  This is a 47-year-old female with problem list as   follows:  1.  Migraine/cephalgia:   This appears to be worsening of her normal symptoms.    This is associated with diffuse myalgias and arthralgias.  The patient has   recently been taken off of her immunomodulators, she feels this may be her   seronegative rheumatoid arthritis acting up because she is off her   medications, and she certainly maybe correct.  I had a long discussion with   her tonight.  For the moment, we will treat her with cautious p.r.n. support,   and then we will discuss with rheumatology in the a.m.  We discussed whether   or not start steroids empirically at this point in time, we will hold off   until we can discuss with rheumatology.  2.  Chronic diarrhea:  Patient has been extensively evaluated by GI   consultants.  I do not think she has C. diff tonight as this is basically her   baseline.  I do not think that further workup in this regard is indicated.  We   will continue to treat her p.r.n.  3.  History of pseudotumor cerebri:  Followed by Dr. Drew Berry.  4.  Elevated liver enzymes:  This was attributed to her methotrexate, her LFTs   have since normalized after coming off of her methotrexate.  5.  Ventriculoperitoneal shunt:  Again, followed by Dr. Drew Berry.       ____________________________________     DO RENITA Reilly / EUGENE    DD:  10/15/2017 02:30:17  DT:  10/15/2017 02:53:40    D#:  [id number]  Job#:  [id number]    cc: SHUN MOODY MD

## 2017-10-15 NOTE — PROGRESS NOTES
Received report from CAROLINA Wagner @ 9050.  Pt arrived to floor @ 0049.  Pt ambulated to bed then to scale.  Steady gait.  NAD noted.   at bedside.  VS stable.  Admit profile completed.  Assessment completed.  No questions or concerns to address.     Will medicate pt per MAR.

## 2017-10-15 NOTE — ED NOTES
Report received from Roxana FIGUEROA @2409. Pt has had no needs to this point, awaiting transfer to floor, has been seen by hospitalist.

## 2017-10-15 NOTE — ED NOTES
Pt resting on gurney with  at bedside.  Pt with av shunt.  Usually has right sided headache and now has left sided headache.  Ok with erp to start iv in right breast.  IV started and unable to draw labs.  Lab called.

## 2017-10-16 ENCOUNTER — APPOINTMENT (OUTPATIENT)
Dept: RADIOLOGY | Facility: MEDICAL CENTER | Age: 47
DRG: 545 | End: 2017-10-16
Attending: INTERNAL MEDICINE
Payer: MEDICARE

## 2017-10-16 PROBLEM — R52 INTRACTABLE PAIN: Status: ACTIVE | Noted: 2017-10-16

## 2017-10-16 LAB
ANION GAP SERPL CALC-SCNC: 8 MMOL/L (ref 0–11.9)
BUN SERPL-MCNC: 6 MG/DL (ref 8–22)
CALCIUM SERPL-MCNC: 8.6 MG/DL (ref 8.5–10.5)
CHLORIDE SERPL-SCNC: 108 MMOL/L (ref 96–112)
CK SERPL-CCNC: 79 U/L (ref 0–154)
CO2 SERPL-SCNC: 24 MMOL/L (ref 20–33)
CREAT SERPL-MCNC: 0.51 MG/DL (ref 0.5–1.4)
ERYTHROCYTE [DISTWIDTH] IN BLOOD BY AUTOMATED COUNT: 43.3 FL (ref 35.9–50)
GFR SERPL CREATININE-BSD FRML MDRD: >60 ML/MIN/1.73 M 2
GLUCOSE SERPL-MCNC: 94 MG/DL (ref 65–99)
GRAM STN SPEC: NORMAL
HCT VFR BLD AUTO: 36.8 % (ref 37–47)
HGB BLD-MCNC: 12.2 G/DL (ref 12–16)
HLA-B27 QL FC: NEGATIVE
MCH RBC QN AUTO: 31 PG (ref 27–33)
MCHC RBC AUTO-ENTMCNC: 33.2 G/DL (ref 33.6–35)
MCV RBC AUTO: 93.4 FL (ref 81.4–97.8)
PLATELET # BLD AUTO: 323 K/UL (ref 164–446)
PMV BLD AUTO: 9.7 FL (ref 9–12.9)
POTASSIUM SERPL-SCNC: 3.7 MMOL/L (ref 3.6–5.5)
RBC # BLD AUTO: 3.94 M/UL (ref 4.2–5.4)
SIGNIFICANT IND 70042: NORMAL
SITE SITE: NORMAL
SODIUM SERPL-SCNC: 140 MMOL/L (ref 135–145)
SOURCE SOURCE: NORMAL
WBC # BLD AUTO: 5 K/UL (ref 4.8–10.8)

## 2017-10-16 PROCEDURE — 82550 ASSAY OF CK (CPK): CPT

## 2017-10-16 PROCEDURE — 76937 US GUIDE VASCULAR ACCESS: CPT

## 2017-10-16 PROCEDURE — 700105 HCHG RX REV CODE 258: Performed by: HOSPITALIST

## 2017-10-16 PROCEDURE — 99233 SBSQ HOSP IP/OBS HIGH 50: CPT | Performed by: INTERNAL MEDICINE

## 2017-10-16 PROCEDURE — 700102 HCHG RX REV CODE 250 W/ 637 OVERRIDE(OP): Performed by: HOSPITALIST

## 2017-10-16 PROCEDURE — 770006 HCHG ROOM/CARE - MED/SURG/GYN SEMI*

## 2017-10-16 PROCEDURE — 87077 CULTURE AEROBIC IDENTIFY: CPT

## 2017-10-16 PROCEDURE — 93970 EXTREMITY STUDY: CPT

## 2017-10-16 PROCEDURE — 700111 HCHG RX REV CODE 636 W/ 250 OVERRIDE (IP): Performed by: INTERNAL MEDICINE

## 2017-10-16 PROCEDURE — 85027 COMPLETE CBC AUTOMATED: CPT

## 2017-10-16 PROCEDURE — 700111 HCHG RX REV CODE 636 W/ 250 OVERRIDE (IP): Performed by: HOSPITALIST

## 2017-10-16 PROCEDURE — 700102 HCHG RX REV CODE 250 W/ 637 OVERRIDE(OP): Performed by: INTERNAL MEDICINE

## 2017-10-16 PROCEDURE — 87070 CULTURE OTHR SPECIMN AEROBIC: CPT

## 2017-10-16 PROCEDURE — 80048 BASIC METABOLIC PNL TOTAL CA: CPT

## 2017-10-16 PROCEDURE — 87205 SMEAR GRAM STAIN: CPT

## 2017-10-16 PROCEDURE — A9270 NON-COVERED ITEM OR SERVICE: HCPCS | Performed by: HOSPITALIST

## 2017-10-16 PROCEDURE — A9270 NON-COVERED ITEM OR SERVICE: HCPCS | Performed by: INTERNAL MEDICINE

## 2017-10-16 RX ORDER — MORPHINE SULFATE 15 MG/1
15 TABLET, FILM COATED, EXTENDED RELEASE ORAL EVERY 12 HOURS
Status: DISCONTINUED | OUTPATIENT
Start: 2017-10-16 | End: 2017-10-19 | Stop reason: HOSPADM

## 2017-10-16 RX ADMIN — DIPHENHYDRAMINE HYDROCHLORIDE 50 MG: 50 INJECTION, SOLUTION INTRAMUSCULAR; INTRAVENOUS at 21:06

## 2017-10-16 RX ADMIN — MORPHINE SULFATE 15 MG: 15 TABLET, EXTENDED RELEASE ORAL at 21:06

## 2017-10-16 RX ADMIN — MORPHINE SULFATE 15 MG: 15 TABLET, EXTENDED RELEASE ORAL at 09:49

## 2017-10-16 RX ADMIN — KETOROLAC TROMETHAMINE 60 MG: 30 INJECTION, SOLUTION INTRAMUSCULAR at 17:04

## 2017-10-16 RX ADMIN — ASPIRIN 81 MG: 81 TABLET, CHEWABLE ORAL at 08:00

## 2017-10-16 RX ADMIN — SODIUM CHLORIDE: 9 INJECTION, SOLUTION INTRAVENOUS at 04:05

## 2017-10-16 RX ADMIN — DIPHENHYDRAMINE HYDROCHLORIDE 50 MG: 50 INJECTION, SOLUTION INTRAMUSCULAR; INTRAVENOUS at 14:37

## 2017-10-16 RX ADMIN — ASPIRIN 81 MG: 81 TABLET, CHEWABLE ORAL at 21:06

## 2017-10-16 RX ADMIN — DULOXETINE HYDROCHLORIDE 60 MG: 60 CAPSULE, DELAYED RELEASE ORAL at 22:27

## 2017-10-16 RX ADMIN — OXYCODONE HYDROCHLORIDE 10 MG: 10 TABLET ORAL at 11:37

## 2017-10-16 RX ADMIN — OXYCODONE HYDROCHLORIDE 10 MG: 10 TABLET ORAL at 04:06

## 2017-10-16 RX ADMIN — GABAPENTIN 600 MG: 300 CAPSULE ORAL at 14:37

## 2017-10-16 RX ADMIN — DIPHENHYDRAMINE HYDROCHLORIDE 50 MG: 50 INJECTION, SOLUTION INTRAMUSCULAR; INTRAVENOUS at 04:06

## 2017-10-16 RX ADMIN — HYDROMORPHONE HYDROCHLORIDE 1 MG: 1 INJECTION, SOLUTION INTRAMUSCULAR; INTRAVENOUS; SUBCUTANEOUS at 05:33

## 2017-10-16 RX ADMIN — HYDROMORPHONE HYDROCHLORIDE 1 MG: 1 INJECTION, SOLUTION INTRAMUSCULAR; INTRAVENOUS; SUBCUTANEOUS at 18:25

## 2017-10-16 RX ADMIN — SODIUM CHLORIDE: 9 INJECTION, SOLUTION INTRAVENOUS at 17:04

## 2017-10-16 RX ADMIN — RISPERIDONE 1 MG: 1 TABLET, FILM COATED ORAL at 22:28

## 2017-10-16 RX ADMIN — DULOXETINE HYDROCHLORIDE 60 MG: 60 CAPSULE, DELAYED RELEASE ORAL at 11:36

## 2017-10-16 RX ADMIN — OXYCODONE HYDROCHLORIDE 10 MG: 10 TABLET ORAL at 21:06

## 2017-10-16 RX ADMIN — OXYCODONE HYDROCHLORIDE 10 MG: 10 TABLET ORAL at 08:01

## 2017-10-16 RX ADMIN — GABAPENTIN 600 MG: 300 CAPSULE ORAL at 05:33

## 2017-10-16 RX ADMIN — HYDROMORPHONE HYDROCHLORIDE 1 MG: 1 INJECTION, SOLUTION INTRAMUSCULAR; INTRAVENOUS; SUBCUTANEOUS at 22:27

## 2017-10-16 RX ADMIN — GABAPENTIN 600 MG: 300 CAPSULE ORAL at 22:28

## 2017-10-16 RX ADMIN — KETOROLAC TROMETHAMINE 60 MG: 30 INJECTION, SOLUTION INTRAMUSCULAR at 04:06

## 2017-10-16 RX ADMIN — OXYCODONE HYDROCHLORIDE 10 MG: 10 TABLET ORAL at 15:46

## 2017-10-16 RX ADMIN — RISPERIDONE 1 MG: 1 TABLET, FILM COATED ORAL at 11:37

## 2017-10-16 RX ADMIN — OMEPRAZOLE 20 MG: 20 CAPSULE, DELAYED RELEASE ORAL at 08:00

## 2017-10-16 RX ADMIN — HYDROMORPHONE HYDROCHLORIDE 1 MG: 1 INJECTION, SOLUTION INTRAMUSCULAR; INTRAVENOUS; SUBCUTANEOUS at 14:36

## 2017-10-16 RX ADMIN — HYDROMORPHONE HYDROCHLORIDE 1 MG: 1 INJECTION, SOLUTION INTRAMUSCULAR; INTRAVENOUS; SUBCUTANEOUS at 01:13

## 2017-10-16 ASSESSMENT — ENCOUNTER SYMPTOMS
NAUSEA: 0
CONSTIPATION: 0
CHILLS: 0
INSOMNIA: 0
DIARRHEA: 0
FOCAL WEAKNESS: 0
WEAKNESS: 0
SEIZURES: 0
VOMITING: 0
FALLS: 0
BLOOD IN STOOL: 0
COUGH: 0
LOSS OF CONSCIOUSNESS: 0
DIZZINESS: 0
EYE REDNESS: 0
PALPITATIONS: 0
SHORTNESS OF BREATH: 0
TREMORS: 0
ABDOMINAL PAIN: 0
EYE PAIN: 0
WHEEZING: 0
HEMOPTYSIS: 0
HEADACHES: 1
NERVOUS/ANXIOUS: 0
FEVER: 0
MYALGIAS: 1

## 2017-10-16 ASSESSMENT — PAIN SCALES - GENERAL
PAINLEVEL_OUTOF10: 9
PAINLEVEL_OUTOF10: 9
PAINLEVEL_OUTOF10: 8
PAINLEVEL_OUTOF10: 7
PAINLEVEL_OUTOF10: 8
PAINLEVEL_OUTOF10: 9
PAINLEVEL_OUTOF10: 8

## 2017-10-16 NOTE — PROGRESS NOTES
Patient off unit via wheelchair with tech to S521-2.   Pt states personal belongings are in possession.  Chart and meds given to tech

## 2017-10-16 NOTE — PROGRESS NOTES
"Report received from nurse Sam. Patient transported to room via wheelchair. Patient oriented to room and call light. Patient is AAOx4. Patient is crying and tearful, stating \"I'm having pain\".  Patient provided with medication, refer to MAR. Reassurance provided to patient. Labs and orders reviewed. Assessment completed. Patient provided with scheduled medication, refer to MAR. Plan of care discussed with patient; questions have been answered at this time. Patient needs have been met at this time. Patient encouraged to call when needing assistance. Call light within reach. Treaded socks in place. Bed locked and in the lowest position. Hourly rounding in place.   "

## 2017-10-16 NOTE — CARE PLAN
Problem: Safety  Goal: Will remain free from falls    Intervention: Assess risk factors for falls  Patient environment is clutter free. Bedside table and personal possession near patient. Treaded socks in place. Bed locked and in the lowest position. Patient encouraged to call when needing assistance. Call light within reach.       Problem: Infection  Goal: Will remain free from infection    Intervention: Assess signs and symptoms of infection  Assessing patient for s/s of infection. Aseptic technique used while providing care to patient. Monitoring Vital signs and lab values.

## 2017-10-16 NOTE — PROGRESS NOTES
Neisha Logan Fall Risk Assessment:     Last Known Fall: Within the last six months  Mobility: No limitations  Medications: Cardiovascular or central nervous system meds  Mental Status/LOC/Awareness: Awake, alert, and oriented to date, place, and person  Toileting Needs: No needs  Volume/Electrolyte Status: Use of IV fluids/tube feeds  Communication/Sensory: Visual (Glasses)/hearing deficit  Behavior: Depression/anxiety  Neisha Logan Fall Risk Total: 9  Fall Risk Level: LOW RISK    Universal Fall Precautions:  call light/belongings in reach, bed in low position and locked, use non-slip footwear, adequate lighting, clutter free and spill free environment, educate to call for assistance, educate on level of risk, siderails up x 2    Fall Risk Level Interventions:   TRIAL (TELE 8, NEURO, MED MAGDALENA 5) Low Fall Risk Interventions  Place yellow fall risk ID band on patient: completed  Provide patient/family education based on risk assessment: completed  Educate patient/family to call staff for assistance when getting out of bed: completed  Place fall precaution signage outside patient door: completed      Patient Specific Interventions:     Medication: review medications with patient and family  Mental Status/LOC/Awareness: reinforce falls education, check on patient hourly and reinforce the use of call light  Toileting: instruct patient/family on the need to call for assistance when toileting  Volume/Electrolyte Status: ensure patient remains hydrated and monitor abnormal lab values  Communication/Sensory: update plan of care on whiteboard and ensure patient has glasses/contacts and hearing aids/dentures  Behavioral: encourage patient to voice feelings, administer medication as ordered and instruct/reinforce fall program rationale  Mobility: provide comfort measures during transport, ensure bed is locked and in lowest position, provide appropriate assistive device and instruct patient to exit bed on their strongest  side

## 2017-10-16 NOTE — PROCEDURES
DATE OF SERVICE:  10/13/2017    This is an outpatient EEG.  This was done on October 13, 2017.    ROUTINE ELECTROENCEPHALOGRAM REPORT        NAME: Enedelia Pang     REFERRING Dr: Selina Reece     INDICATION: pt with episodes of flashing lights x 12 + years but more intense lately. pt with hx of migraine, depresion, anxiety. no known hx of sz. + hx of pseudo tumor and  shunt on right         TECHNIQUE: 30 channel routine electroencephalogram (EEG) was performed in accordance with the international 10-20 system. The study was reviewed in bipolar and referential montages. The recording examined the patient during wakeful and drowsy state(s).      DESCRIPTION OF THE RECORD:        Background rhythm during awake stage shows well-organized, well-developed, average voltage 10 to 11 hertz alpha activity in the posterior regions.  It blocks with eye opening and it is bilaterally synchronous and symmetrical.  No spike-and-wave discharges but there are generalized bursts of poly and monomorphic delta ( more over right)  Photic stimulation did not produce any abnormalities.Stage I sleep was achieved.        ACTIVATION PROCEDURES:       Photic Stimulation were done     ICTAL AND/OR INTERICTAL FINDINGS:    No focal or generalized epileptiform activity noted. t there are generalized bursts of poly and monomorphic delta ( more over right)   No clinical events or seizures were reported or recorded during the study.       EKG: sampling of the EKG recording demonstrated sinus rhythm.          INTERPRETATION:        ________________________________________________________________________     This scalp EEG denotes nonspecific cortical dysfunction - not localizing well-- ( more over right)     There are no definite epileptiform but Interictal or Post-ictal remains possible     Of note, unremarkable EEG does not completely exclude the diagnosis  of seizures since seizure is an episodic phenomena.  Clinical correlation may help      If clinical suspicion of seizure remains high.  Prolonged outpatient EEG   monitoring may be of help.     ________________________________________________________________________                     ____________________________________     MD MAYA FOUNTAIN / EUGENE    DD:  10/16/2017 00:18:59  DT:  10/16/2017 00:49:03    D#:  [id number]  Job#:  [id number]    cc: Selina PERAZA

## 2017-10-16 NOTE — CARE PLAN
Problem: Nutritional:  Goal: Achieve adequate nutritional intake  Patient will consume 50% of meals  Outcome: NOT MET  No PO intake records yet available.

## 2017-10-16 NOTE — PROGRESS NOTES
Spoke with PICC line nurse, Jermain, placement scheduled for afternoon.  Pt and receiving nurse informed

## 2017-10-16 NOTE — DIETARY
"Nutrition Services: Patient seen for poor PO Intake prior to admit.     47 y.o. female with admitting DX of Seronegative rheumatoid arthritis (CMS-HCC), Migraine  Pertinent History: hydrocephalus,  shunt, chronic diarrhea    Height: 160 cm (5' 3\")  Weight: 72.9 kg (160 lb 11.5 oz)  Body mass index is 28.47 kg/m².  She appears well nourished.    Patient stated she has chronic diarrhea.  She reported she is awaiting results of recent endoscopy and is followed by GI.  She stated her weight has been stable for several months.  She stated she does better with small frequent meals.    Patient is on a regular diet; patient stated she would like between meal snacks.  Labs, medications and past medical history reviewed.    Recommend:  Encourage PO intake.  Nutrition Representative will continue to see her for ongoing meal and snack preferences.  RD following.      "

## 2017-10-16 NOTE — DISCHARGE PLANNING
Pulled patient's Advance Directive off of scanning. Unfortunately we are missing pages making document invalid. Spoke with Maureen, , to obtain Certificate of Competency.

## 2017-10-16 NOTE — PROCEDURES
PICC Insertion Final 3CG    Consents confirmed, vessel patency confirmed with ultrasound. Risks and benefits of procedure explained to patient/family and education regarding central line associated bloodstream infections provided. Questions answered.     PICC placed in RUE per MD order with ultrasound guidance. 4 Fr, single lumen PICC placed in Bracial vein after 1 attempt(s). 4 cc's of 1% lidocaine injected intradermally, 21 gauge microintroducer needle and modified Seldinger technique used. 40 cm catheter inserted with good blood return. Secured at 0cm marker. Each lumen flushed without resistance with 10 mL 0.9% normal saline. PICC line secured with Biopatch and Tegaderm.    PICC placement is confirmed by nurse using 3CG technology. PICC line is appropriate for use at this time. Pt tolerated procedure well.  Patient condition relayed to unit RN or ordering physician via this post procedure note in the EMR.     [facility] Pain Management Power PICC ref # [id number], Lot # [id number]

## 2017-10-16 NOTE — PROGRESS NOTES
Assumed patient care.  Bedside report received from CAROLINA López.  Pt reports 8/10 generalized pain, medicated per MAR.  IVF infusing per MAR.  Pt updated on POC, communications board updated.  Needs met, will continue to monitor

## 2017-10-16 NOTE — PROGRESS NOTES
ROUTINE ELECTROENCEPHALOGRAM REPORT      NAME: Enedelia Pang    REFERRING Dr: Selina Reece    INDICATION: pt with episodes of flashing lights x 12 + years but more intense lately. pt with hx of migraine, depresion, anxiety. no known hx of sz. 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 The study was reviewed in bipolar and referential montages. The recording examined the patient during wakeful and drowsy state(s).     DESCRIPTION OF THE RECORD:      Background rhythm during awake stage shows well-organized, well-developed, average voltage 10 to 11 hertz alpha activity in the posterior regions.  It blocks with eye opening and it is bilaterally synchronous and symmetrical.  No spike-and-wave discharges but there are generalized bursts of poly and monomorphic delta ( more over right)  Photic stimulation did not produce any abnormalities.Stage I sleep was achieved.      ACTIVATION PROCEDURES:      Photic Stimulation were done    ICTAL AND/OR INTERICTAL FINDINGS:    No focal or generalized epileptiform activity noted. t there are generalized bursts of poly and monomorphic delta ( more over right)   No clinical events or seizures were reported or recorded during the study.      EKG: sampling of the EKG recording demonstrated sinus rhythm.        INTERPRETATION:      ________________________________________________________________________    This scalp EEG denotes nonspecific cortical dysfunction - not localizing well-- ( more over right)    There are no definite epileptiform but Interictal or Post-ictal remains possible    Of note, unremarkable EEG does not completely exclude the diagnosis  of seizures since seizure is an episodic phenomena.  Clinical correlation may help     If clinical suspicion of seizure remains high.  Prolonged outpatient EEG   monitoring may be of  help.    ________________________________________________________________________

## 2017-10-16 NOTE — PROGRESS NOTES
Spoke with PICC line nurse Jermain, in regards to patient PICC placement. Nurse Aly informed of PICC placement ordered. Nurse Aly stated within two hours patient should have their placement.

## 2017-10-17 LAB
ERYTHROCYTE [DISTWIDTH] IN BLOOD BY AUTOMATED COUNT: 42.5 FL (ref 35.9–50)
HCT VFR BLD AUTO: 35.5 % (ref 37–47)
HGB BLD-MCNC: 11.7 G/DL (ref 12–16)
MCH RBC QN AUTO: 30.6 PG (ref 27–33)
MCHC RBC AUTO-ENTMCNC: 33 G/DL (ref 33.6–35)
MCV RBC AUTO: 92.9 FL (ref 81.4–97.8)
PLATELET # BLD AUTO: 321 K/UL (ref 164–446)
PMV BLD AUTO: 9.6 FL (ref 9–12.9)
RBC # BLD AUTO: 3.82 M/UL (ref 4.2–5.4)
WBC # BLD AUTO: 6.5 K/UL (ref 4.8–10.8)

## 2017-10-17 PROCEDURE — 700111 HCHG RX REV CODE 636 W/ 250 OVERRIDE (IP): Performed by: HOSPITALIST

## 2017-10-17 PROCEDURE — A9270 NON-COVERED ITEM OR SERVICE: HCPCS | Performed by: INTERNAL MEDICINE

## 2017-10-17 PROCEDURE — 700102 HCHG RX REV CODE 250 W/ 637 OVERRIDE(OP): Performed by: HOSPITALIST

## 2017-10-17 PROCEDURE — 700105 HCHG RX REV CODE 258: Performed by: HOSPITALIST

## 2017-10-17 PROCEDURE — 85027 COMPLETE CBC AUTOMATED: CPT

## 2017-10-17 PROCEDURE — A9270 NON-COVERED ITEM OR SERVICE: HCPCS | Performed by: HOSPITALIST

## 2017-10-17 PROCEDURE — 99232 SBSQ HOSP IP/OBS MODERATE 35: CPT | Performed by: HOSPITALIST

## 2017-10-17 PROCEDURE — 700102 HCHG RX REV CODE 250 W/ 637 OVERRIDE(OP): Performed by: INTERNAL MEDICINE

## 2017-10-17 PROCEDURE — 770006 HCHG ROOM/CARE - MED/SURG/GYN SEMI*

## 2017-10-17 PROCEDURE — 700111 HCHG RX REV CODE 636 W/ 250 OVERRIDE (IP): Performed by: INTERNAL MEDICINE

## 2017-10-17 RX ORDER — ASPIRIN 81 MG/1
81 TABLET, CHEWABLE ORAL DAILY
Status: DISCONTINUED | OUTPATIENT
Start: 2017-10-18 | End: 2017-10-19 | Stop reason: HOSPADM

## 2017-10-17 RX ORDER — KETOROLAC TROMETHAMINE 30 MG/ML
60 INJECTION, SOLUTION INTRAMUSCULAR; INTRAVENOUS EVERY 12 HOURS PRN
Status: DISCONTINUED | OUTPATIENT
Start: 2017-10-17 | End: 2017-10-19 | Stop reason: HOSPADM

## 2017-10-17 RX ORDER — PREDNISONE 20 MG/1
40 TABLET ORAL DAILY
Status: DISCONTINUED | OUTPATIENT
Start: 2017-10-17 | End: 2017-10-19 | Stop reason: HOSPADM

## 2017-10-17 RX ADMIN — HYDROMORPHONE HYDROCHLORIDE 1 MG: 1 INJECTION, SOLUTION INTRAMUSCULAR; INTRAVENOUS; SUBCUTANEOUS at 05:20

## 2017-10-17 RX ADMIN — SODIUM CHLORIDE: 9 INJECTION, SOLUTION INTRAVENOUS at 16:22

## 2017-10-17 RX ADMIN — HYDROMORPHONE HYDROCHLORIDE 1 MG: 1 INJECTION, SOLUTION INTRAMUSCULAR; INTRAVENOUS; SUBCUTANEOUS at 09:17

## 2017-10-17 RX ADMIN — SODIUM CHLORIDE: 9 INJECTION, SOLUTION INTRAVENOUS at 08:27

## 2017-10-17 RX ADMIN — HYDROMORPHONE HYDROCHLORIDE 1 MG: 1 INJECTION, SOLUTION INTRAMUSCULAR; INTRAVENOUS; SUBCUTANEOUS at 15:33

## 2017-10-17 RX ADMIN — HYDROMORPHONE HYDROCHLORIDE 1 MG: 1 INJECTION, SOLUTION INTRAMUSCULAR; INTRAVENOUS; SUBCUTANEOUS at 19:14

## 2017-10-17 RX ADMIN — STANDARDIZED SENNA CONCENTRATE AND DOCUSATE SODIUM 2 TABLET: 8.6; 5 TABLET, FILM COATED ORAL at 21:46

## 2017-10-17 RX ADMIN — HYDROMORPHONE HYDROCHLORIDE 1 MG: 1 INJECTION, SOLUTION INTRAMUSCULAR; INTRAVENOUS; SUBCUTANEOUS at 01:50

## 2017-10-17 RX ADMIN — HYDROMORPHONE HYDROCHLORIDE 1 MG: 1 INJECTION, SOLUTION INTRAMUSCULAR; INTRAVENOUS; SUBCUTANEOUS at 22:22

## 2017-10-17 RX ADMIN — GABAPENTIN 600 MG: 300 CAPSULE ORAL at 15:04

## 2017-10-17 RX ADMIN — DIPHENHYDRAMINE HYDROCHLORIDE 50 MG: 50 INJECTION, SOLUTION INTRAMUSCULAR; INTRAVENOUS at 03:09

## 2017-10-17 RX ADMIN — DULOXETINE HYDROCHLORIDE 60 MG: 60 CAPSULE, DELAYED RELEASE ORAL at 11:03

## 2017-10-17 RX ADMIN — OXYCODONE HYDROCHLORIDE 10 MG: 10 TABLET ORAL at 11:04

## 2017-10-17 RX ADMIN — DIPHENHYDRAMINE HYDROCHLORIDE 50 MG: 50 INJECTION, SOLUTION INTRAMUSCULAR; INTRAVENOUS at 09:17

## 2017-10-17 RX ADMIN — GABAPENTIN 600 MG: 300 CAPSULE ORAL at 21:45

## 2017-10-17 RX ADMIN — ASPIRIN 81 MG: 81 TABLET, CHEWABLE ORAL at 08:17

## 2017-10-17 RX ADMIN — MORPHINE SULFATE 15 MG: 15 TABLET, EXTENDED RELEASE ORAL at 21:45

## 2017-10-17 RX ADMIN — OMEPRAZOLE 20 MG: 20 CAPSULE, DELAYED RELEASE ORAL at 08:17

## 2017-10-17 RX ADMIN — SODIUM CHLORIDE: 9 INJECTION, SOLUTION INTRAVENOUS at 00:15

## 2017-10-17 RX ADMIN — PREDNISONE 40 MG: 20 TABLET ORAL at 18:21

## 2017-10-17 RX ADMIN — GABAPENTIN 600 MG: 300 CAPSULE ORAL at 06:34

## 2017-10-17 RX ADMIN — OXYCODONE HYDROCHLORIDE 10 MG: 10 TABLET ORAL at 06:34

## 2017-10-17 RX ADMIN — RISPERIDONE 1 MG: 1 TABLET, FILM COATED ORAL at 21:45

## 2017-10-17 RX ADMIN — DIPHENHYDRAMINE HYDROCHLORIDE 50 MG: 50 INJECTION, SOLUTION INTRAMUSCULAR; INTRAVENOUS at 21:44

## 2017-10-17 RX ADMIN — KETOROLAC TROMETHAMINE 60 MG: 30 INJECTION, SOLUTION INTRAMUSCULAR at 17:44

## 2017-10-17 RX ADMIN — RISPERIDONE 1 MG: 1 TABLET, FILM COATED ORAL at 11:04

## 2017-10-17 RX ADMIN — HYDROMORPHONE HYDROCHLORIDE 1 MG: 1 INJECTION, SOLUTION INTRAMUSCULAR; INTRAVENOUS; SUBCUTANEOUS at 12:24

## 2017-10-17 RX ADMIN — MORPHINE SULFATE 15 MG: 15 TABLET, EXTENDED RELEASE ORAL at 08:17

## 2017-10-17 RX ADMIN — DIPHENHYDRAMINE HYDROCHLORIDE 50 MG: 50 INJECTION, SOLUTION INTRAMUSCULAR; INTRAVENOUS at 15:33

## 2017-10-17 RX ADMIN — OXYCODONE HYDROCHLORIDE 10 MG: 10 TABLET ORAL at 00:15

## 2017-10-17 RX ADMIN — KETOROLAC TROMETHAMINE 60 MG: 30 INJECTION, SOLUTION INTRAMUSCULAR at 05:20

## 2017-10-17 RX ADMIN — DULOXETINE HYDROCHLORIDE 60 MG: 60 CAPSULE, DELAYED RELEASE ORAL at 21:45

## 2017-10-17 ASSESSMENT — PAIN SCALES - GENERAL
PAINLEVEL_OUTOF10: 7
PAINLEVEL_OUTOF10: 7
PAINLEVEL_OUTOF10: 10
PAINLEVEL_OUTOF10: 9
PAINLEVEL_OUTOF10: 7
PAINLEVEL_OUTOF10: 7
PAINLEVEL_OUTOF10: 8
PAINLEVEL_OUTOF10: 9
PAINLEVEL_OUTOF10: 6
PAINLEVEL_OUTOF10: 9
PAINLEVEL_OUTOF10: 7
PAINLEVEL_OUTOF10: 9
PAINLEVEL_OUTOF10: 7
PAINLEVEL_OUTOF10: 8
PAINLEVEL_OUTOF10: 8
PAINLEVEL_OUTOF10: 9
PAINLEVEL_OUTOF10: 9

## 2017-10-17 ASSESSMENT — ENCOUNTER SYMPTOMS
NERVOUS/ANXIOUS: 0
FEVER: 0
TREMORS: 0
ORTHOPNEA: 0
EYE PAIN: 0
PHOTOPHOBIA: 0
BACK PAIN: 0
NECK PAIN: 0
MYALGIAS: 1
DIARRHEA: 0
PALPITATIONS: 0
HEADACHES: 0
STRIDOR: 0
BLURRED VISION: 0
SORE THROAT: 0
MEMORY LOSS: 0
INSOMNIA: 0
HEMOPTYSIS: 0
LOSS OF CONSCIOUSNESS: 0
PND: 0
HEARTBURN: 0
DIZZINESS: 0
CHILLS: 0
ABDOMINAL PAIN: 0
WEAKNESS: 0
TINGLING: 0
SENSORY CHANGE: 0
BLOOD IN STOOL: 0
SPEECH CHANGE: 0
FALLS: 0
WHEEZING: 0
DOUBLE VISION: 0
EYE REDNESS: 0
SEIZURES: 0
VOMITING: 0
CONSTIPATION: 0
NAUSEA: 0
COUGH: 0
SPUTUM PRODUCTION: 0
DEPRESSION: 0
FOCAL WEAKNESS: 0
CLAUDICATION: 0
SHORTNESS OF BREATH: 0

## 2017-10-17 NOTE — CONSULTS
Chief Complaint- diffuse pain and hand swelling    Chief Complaint   Patient presents with   • Migraine   • Hand Swelling   • Body Aches     Enedelia Pang is a 47 y.o. female here as a new Rheumatology Consult for consultation regarding hand swelling    HPI:       Ms. Enedelia Pang presents with 2 weeks of diffuse myalgias and recurrent hand swelling.  She has had a history of recurrent lesions over the past two years managed as MRSA infection.  She report that her rash would start as a small papule and eventually ulcerate.  She also developed septum perforation and would also develop recurrent episodes of transaminitis. She presented in January with diffuse pain and hand and feet swelling.  Her work-up thus far has been negative for ANCA, Rheumatoid factor, CCP, MG, HLA B27, antiphospholipid antibodies.  Her hand xray did show periarticular osteopenia.      She has been managed with a working diagnosis of seronegative rheumatoid arthritis however as with her previous medications she developed transaminitis.  Biopsy suggests methotrexate injury but her transaminitis precedes the start of DMARD therapy.    We have since tapered her prednisone.  In that time she has developed morning swelling.  In September her skin lesions resurfaced.  Dermatology did biopsy of these lesions noted thrombotic vasculopathy.  I did speak with Dermatology and felt this was more arterial in nature. Furthermore they did not appreciate signs of vasculitis and DIF was pending.    She notes over the last two weeks she has experienced increasing pain described as a burning myalgia likened to after intensely working out.  She also note the left hand has been constantly swollen.  She feels the diffuse myalgias has now aggravated her migraines.  In the last week she also reports diarrhea.  She notes 2-3 days prior to presentation she has had severe diarrhea such as she reports 12 episodes a day.   Since admission she reports her diarrheas  has improved and today she has not had any diarrhea.  She also denies any dysuria.  She did have a fever prior to her presentation to the hospital.    She denies any oral ulcers.  She notes mild joint pain but her muscle aches is significantly worse than any joint pain.  She also notes the right thumb has numbness and her fingers are experiencing tingling.  She also describes weakness in her hands and dysesthesias of her skin.     Current medicines (including changes today)  Current Facility-Administered Medications   Medication Dose Route Frequency Provider Last Rate Last Dose   • morphine ER (MS CONTIN) tablet 15 mg  15 mg Oral Q12HRS Armand William M.D.   15 mg at 10/16/17 2106   • normal saline PF 10-20 mL  10-20 mL Intravenous PRN Armand William M.D.       • normal saline PF 10-20 mL  10-20 mL Intravenous PRN Armand William M.D.       • Influenza Vaccine Quad pf injection 0.5 mL  0.5 mL Intramuscular Once PRN LEYLA Yanes.KATHY.       • pneumococcal 13-Macey Conj Vacc (PREVNAR 13) syringe 0.5 mL  0.5 mL Intramuscular Once PRN Jorge Canales D.O.       • acetaminophen (TYLENOL) tablet 650 mg  650 mg Oral Q6HRS PRN Armand William M.D.       • Pharmacy Consult Request ...Pain Management Review   Other PRN Armand William M.D.        And   • oxycodone immediate-release (ROXICODONE) tablet 5 mg  5 mg Oral Q3HRS PRN Armand William M.D.        And   • oxycodone immediate release (ROXICODONE) tablet 10 mg  10 mg Oral Q3HRS PRN Armand William M.D.   10 mg at 10/16/17 2106    And   • HYDROmorphone (DILAUDID) injection 1 mg  1 mg Intravenous Q3HRS PRN Armand William M.D.   1 mg at 10/16/17 1825   • risperidone (RISPERDAL) tablet 1 mg  1 mg Oral BID LEYLA Yanes.O.   1 mg at 10/16/17 1137   • omeprazole (PRILOSEC) capsule 20 mg  20 mg Oral DAILY LEYLA Yanes.O.   20 mg at 10/16/17 0800   • gabapentin (NEURONTIN) capsule 600 mg  600 mg Oral TID Jorge  Ally D.O.   600 mg at 10/16/17 1437   • duloxetine (CYMBALTA) capsule 60 mg  60 mg Oral BID Jorge Canales D.O.   60 mg at 10/16/17 1136   • aspirin (ASA) chewable tab 81 mg  81 mg Oral BID Jorge Canales D.O.   81 mg at 10/16/17 2106   • ketorolac (TORADOL) injection 60 mg  60 mg Intravenous Q12HRS PRN Jorge Canales D.O.   60 mg at 10/16/17 1704   • diphenhydrAMINE (BENADRYL) injection 50 mg  50 mg Intravenous Q6HRS PRN Jorge Canales D.O.   50 mg at 10/16/17 2106   • senna-docusate (PERICOLACE or SENOKOT S) 8.6-50 MG per tablet 2 Tab  2 Tab Oral BID LEYLA Yanes.O.        And   • polyethylene glycol/lytes (MIRALAX) PACKET 1 Packet  1 Packet Oral QDAY PRN Jorge Canales D.O.        And   • magnesium hydroxide (MILK OF MAGNESIA) suspension 30 mL  30 mL Oral QDAY PRN LEYLA Yanes.O.        And   • bisacodyl (DULCOLAX) suppository 10 mg  10 mg Rectal QDAY PRN Jorge Canales D.O.       • NS infusion   Intravenous Continuous LEYLA Yanes.O. 125 mL/hr at 10/16/17 1704     • ondansetron (ZOFRAN) syringe/vial injection 4 mg  4 mg Intravenous Q4HRS PRN LEYLA Yanes.O.       • ondansetron (ZOFRAN ODT) dispertab 4 mg  4 mg Oral Q4HRS PRN LEYLA Yanes.O.       • promethazine (PHENERGAN) tablet 12.5-25 mg  12.5-25 mg Oral Q4HRS PRN LEYLA Yanes.O.       • promethazine (PHENERGAN) suppository 12.5-25 mg  12.5-25 mg Rectal Q4HRS PRN Jorge Canales D.O.       • acetaminophen (TYLENOL) tablet 650 mg  650 mg Oral Q6HRS PRN Jorge Canales D.O.         She  has a past medical history of Allergy, unspecified not elsewhere classified; Anemia (10/05/2017); Anxiety; autoimmune; Depression; H/O Clostridium difficile infection; MRSA infection; Hydrocephalus; Migraine with aura, with intractable migraine, so stated, without mention of status migrainosus; MRSA (methicillin resistant  "Staphylococcus aureus); Pituitary abnormality (CMS-HCC); Staph infection; and Stroke (CMS-HCC). She also has no past medical history of Addisons disease (CMS-HCC).  She  has a past surgical history that includes cholecystectomy; tonsillectomy; appendectomy; tubal ligation; other; other neurological surg; irrigation & debridement neuro (N/A, 6/28/2015); lumbar exploration (N/A, 8/31/2015); spinal cord stimulator (12/19/2016); and  shunt insertion (5/31/2017).  History reviewed. No pertinent family history.  No family status information on file.     Social History   Substance Use Topics   • Smoking status: Never Smoker   • Smokeless tobacco: Never Used   • Alcohol use Yes      Comment: Rare     Social History     Social History Narrative   • No narrative on file         ROS  Constitutional ROS: No unexpected change in weight  Eye ROS: No eye pain, redness, discharge  Ear ROS: No recent change in hearing  Mouth/Throat ROS: No recent change in voice or hoarseness  Neck ROS: No lumps or masses  Pulmonary ROS: No chronic cough, sputum, or hemoptysis  Cardiovascular ROS: No chest pain, No shortness of breath  Gastrointestinal ROS: diarrhea - non bloody  Musculoskeletal/Extremities ROS: Positive for swelling , myalgia  Hematologic/Lymphatic ROS: No weight loss  Skin/Integumentary ROS: lesions apprecaited  Neurologic ROS: headaches       Objective:     Blood pressure 128/78, pulse 82, temperature 36.6 °C (97.8 °F), resp. rate 18, height 1.6 m (5' 3\"), weight 72.9 kg (160 lb 11.5 oz), last menstrual period 10/02/2017, SpO2 95 %, not currently breastfeeding. Body mass index is 28.47 kg/m².  Physical Exam:    Vitals:    10/16/17 0752 10/16/17 1057 10/16/17 1520 10/16/17 2000   BP: 117/71 118/74 138/86 128/78   Pulse: 89 91 (!) 103 82   Resp: 18 17 17 18   Temp: 36.6 °C (97.9 °F) 36.6 °C (97.8 °F) 36.6 °C (97.9 °F) 36.6 °C (97.8 °F)   TempSrc:       SpO2: 98% 98% 95% 95%   Weight:       Height:        Body mass index is 28.47 " kg/m².    General/Constitutional: NAD not diaphoretic, comfortable  Eyes: clear conjunctiva, no scleral icterus, EOMI  Ears, Nose, Mouth,Throat:  no discharge from ears bilaterally  Cardiovascular: regular rate and rhythm.  No murmurs, gallops, rubs  Respiratory: normal effort, unlabored respiration.  On auscultation no wheezes, rales, rhonchi.  Clear to auscultation.  GI: soft,mild TTP on the LLQ, no hepatosplenomegaly  Musculoskeletal  Axial:    Thoracic: no kyphosis    Upper Extremities:  No bogginess, periarticular swelling of the PIP or DIP but tenderness and swelling aroudn the dorsum of the hand and MCP bilateral.   Wrists and Elbows have good ROM  Muscle Strength: 5/5 in deltoids, biceps, triceps, finger , wrists extension bilateral  Lower Extremities:  No knee effusion bilateral, No crepitus bilateral  No MTP tenderness bilateral.  Right foot appears with great toe is doriflexed  Muscle Strength: 5/5 in hip flexion bilateral  Gait is normal  Skin: diffuse swelling of the dorsum of the hand.    On the right lower extremity there are diffuse 0.5-0.75 cm ulcerated circular lesions.  On the left lower extremity there scarring appreciate.  Limited skin exam.  no rashes, no digital ulcerations, no alopecia, no tophi, no skin thickening, no nodules  Neuro: CN II-XII grossly intact, Alert, Oriented x 3, moves all four extremities  Psych: normal affect, normal mood, judgement appropriate, follows commands, responses are appropriate      Lab Results   Component Value Date/Time    QNTTBGOLD Negative 01/09/2017 06:01 PM     Lab Results   Component Value Date/Time    HEPBCORIGM Negative 05/21/2017 02:07 AM    HEPBSAG Negative 05/21/2017 02:07 AM     Lab Results   Component Value Date/Time    HEPCAB Negative 05/21/2017 02:07 AM     Lab Results   Component Value Date/Time    SODIUM 140 10/16/2017 04:15 AM    POTASSIUM 3.7 10/16/2017 04:15 AM    CHLORIDE 108 10/16/2017 04:15 AM    CO2 24 10/16/2017 04:15 AM    GLUCOSE  94 10/16/2017 04:15 AM    BUN 6 (L) 10/16/2017 04:15 AM    CREATININE 0.51 10/16/2017 04:15 AM      Lab Results   Component Value Date/Time    WBC 5.0 10/16/2017 04:15 AM    RBC 3.94 (L) 10/16/2017 04:15 AM    HEMOGLOBIN 12.2 10/16/2017 04:15 AM    HEMATOCRIT 36.8 (L) 10/16/2017 04:15 AM    MCV 93.4 10/16/2017 04:15 AM    MCH 31.0 10/16/2017 04:15 AM    MCHC 33.2 (L) 10/16/2017 04:15 AM    MPV 9.7 10/16/2017 04:15 AM    NEUTSPOLYS 77.00 (H) 10/14/2017 07:40 PM    LYMPHOCYTES 17.60 (L) 10/14/2017 07:40 PM    MONOCYTES 4.30 10/14/2017 07:40 PM    EOSINOPHILS 0.00 10/14/2017 07:40 PM    BASOPHILS 0.70 10/14/2017 07:40 PM    HYPOCHROMIA 1+ 01/11/2017 02:29 PM    ANISOCYTOSIS 1+ 01/11/2017 02:29 PM      Lab Results   Component Value Date/Time    CALCIUM 8.6 10/16/2017 04:15 AM    ASTSGOT 18 10/14/2017 07:40 PM    ALTSGPT 26 10/14/2017 07:40 PM    ALKPHOSPHAT 125 (H) 10/14/2017 07:40 PM    TBILIRUBIN 0.5 10/14/2017 07:40 PM    ALBUMIN 3.8 10/14/2017 07:40 PM    TOTPROTEIN 6.6 10/14/2017 07:40 PM     Lab Results   Component Value Date/Time    URICACID 4.3 09/15/2017 03:45 PM    RHEUMFACTN <10 10/14/2017 07:40 PM    ANTINUCAB None Detected 01/06/2017 04:12 AM     Lab Results   Component Value Date/Time    SEDRATEWES 11 10/14/2017 07:40 PM    CREACTPROT 0.16 10/14/2017 07:40 PM     Lab Results   Component Value Date/Time    RUSSELVIPER 40.3 09/15/2017 03:45 PM    DRVVTINTERP Not Present 09/15/2017 03:45 PM     Lab Results   Component Value Date/Time    E9VIXYWPERZ 178.0 01/06/2017 04:12 AM    T8NEIQICJZG 37.0 01/06/2017 04:12 AM    IGGCARDIOLI 5 09/15/2017 03:45 PM    IGMCARDIOLI 6 09/15/2017 03:45 PM    IGACARDIOLI 2 09/15/2017 03:45 PM    CRYOGLOBULIN NEG 72Hour 04/17/2017 03:53 PM     Lab Results   Component Value Date/Time    NSFUWJX15 0 01/06/2017 04:12 AM    CENTROMBAB 2 01/06/2017 04:12 AM     Lab Results   Component Value Date/Time    ANCAIGG <1:20 01/06/2017 04:12 AM    D1CORSRIINU 178.0 01/06/2017 04:12 AM     Lab  Results   Component Value Date/Time    COLORURINE DK Yellow 10/15/2017 09:40 AM    SPECGRAVITY 1.010 10/15/2017 09:40 AM    PHURINE 5.5 10/15/2017 09:40 AM    GLUCOSEUR Negative 10/15/2017 09:40 AM    KETONES Negative 10/15/2017 09:40 AM    PROTEINURIN Negative 10/15/2017 09:40 AM     No results found for: TOTPROTUR, TOTALVOLUME, FHEFKIAR70  No results found for: SSA60, SSA52  Lab Results   Component Value Date/Time    HBA1C 5.2 04/29/2017 02:09 PM     Lab Results   Component Value Date/Time    CPKTOTAL 79 10/16/2017 04:15 AM     Lab Results   Component Value Date/Time    G6PD 20.4 (H) 01/10/2017 01:54 PM     Lab Results   Component Value Date/Time    QTDT11VPOZ Negative 10/14/2017 07:40 PM     No results found for: ACESERUM  Lab Results   Component Value Date/Time    25HYDROXY 45 01/09/2017 06:00 PM     No results found for: TSH, FREEDIR  Lab Results   Component Value Date/Time    TSHULTRASEN 6.860 (H) 05/21/2017 02:07 AM    FREET4 0.76 01/05/2017 11:22 PM     No results found for: MICROSOMALA, ANTITHYROGL  No results found for: IGGLYMEABS  No results found for: ANTIMITOCHO, FACTIN  No results found for: IGA, TTRANSIGA, ENDOIGA  No results found for: FLTYPE, CRYSTALSBDF  No results found for: ISTATICAL  No results found for: ISTATCREAT  No results found for: CTELOPEP  No results found for: GBMABG  No results found for: PTHINTACT              Results for orders placed during the hospital encounter of 01/05/17   DX-HAND 3+ LEFT    Impression 1.  Periarticular osteopenia raises concern for inflammatory arthropathy.  2.  Dorsal soft tissue swelling over the metacarpals.  3.  No fracture or dislocation of LEFT hand.           Results for orders placed during the hospital encounter of 10/29/14   2-D ECHO W/DOPPLER (ECHOCARDIOGRAM COMP W/O CONT)       Results for orders placed during the hospital encounter of 04/17/17   DX-CERVICAL SPINE-2 OR 3 VIEWS    Impression Suboccipital stimulator leads are without worrisome  change. No discontinuity is seen across the cervical and thoracic spine regions. The generator is not included in the field-of-view, distally             Results for orders placed during the hospital encounter of 03/21/15   LE ART/HARRIET             Assessment and Plan:  Ms. Enedelia Pang presents with severe myalgias and periarticular osteopenia in the left hand.    In addition she has thrombotic angiopathy noted on skin biopsy.  We are pending Protein C, Protein S and repeat of her APS labs.  For her myalgias CK is normal.  Her ESR is normal at 11.    At this point I would continue to monitor and await the results to return. Her exam shows more soft tissue swelling rather than synovitis.  We had planned to evaluate with a CT.      I would await these results and discuss with hematology if additional work-up would be beneficial.  We could consider as she is having diarrhea CT angio of the abdomen to evaluate for an vascular involvement in the abdomen.    Patient was seen 60 minutes face-to-face (excluding time for procedures)  of which more than 50% the time was spent counseling the patient regarding  rheumatological conditions and differential.

## 2017-10-17 NOTE — PROGRESS NOTES
[facility]    Date of Service: 10/16/2017    Chief Complaint  47 y.o. female admitted 10/14/2017 with Migraine; Hand Swelling; and Body Aches        Interval Problem Update  Asking for more ADilaudid. Hands swollen.    Consultants/Specialty  Dr. Andrade, Rheumatology  Dr. Mahoney    Disposition  TBD        Review of Systems   Constitutional: Negative for chills and fever.   HENT: Negative for congestion, hearing loss and nosebleeds.    Eyes: Negative for pain and redness.   Respiratory: Negative for cough, hemoptysis, shortness of breath and wheezing.    Cardiovascular: Negative for chest pain and palpitations.   Gastrointestinal: Negative for abdominal pain, blood in stool, constipation, diarrhea, nausea and vomiting.   Genitourinary: Negative for dysuria, frequency and hematuria.   Musculoskeletal: Positive for joint pain and myalgias. Negative for falls.   Skin: Negative for rash.   Neurological: Positive for headaches. Negative for dizziness, tremors, focal weakness, seizures, loss of consciousness and weakness.   Psychiatric/Behavioral: The patient is not nervous/anxious and does not have insomnia.    All other systems reviewed and are negative.     Physical Exam  Laboratory/Imaging   Hemodynamics  Temp (24hrs), Av.5 °C (97.7 °F), Min:36.1 °C (97 °F), Max:36.7 °C (98.1 °F)   Temperature: 36.6 °C (97.9 °F)  Pulse  Av.2  Min: 79  Max: 119    Blood Pressure: 138/86      Respiratory      Respiration: 17, Pulse Oximetry: 95 %             Fluids    Intake/Output Summary (Last 24 hours) at 10/16/17 1840  Last data filed at 10/16/17 1400   Gross per 24 hour   Intake              240 ml   Output                0 ml   Net              240 ml       Nutrition  Orders Placed This Encounter   Procedures   • Diet Order     Standing Status:   Standing     Number of Occurrences:   1     Order Specific Question:   Diet:     Answer:   Regular [1]     Physical Exam   Constitutional: She appears  well-developed and well-nourished.   HENT:   Head: Normocephalic and atraumatic.   Eyes: Conjunctivae and EOM are normal. No scleral icterus.   Neck: Normal range of motion. Neck supple.   Cardiovascular: Normal rate and regular rhythm.  Exam reveals no gallop and no friction rub.    No murmur heard.  Pulmonary/Chest: Effort normal and breath sounds normal. No respiratory distress. She has no wheezes. She has no rales.   Abdominal: Soft. Bowel sounds are normal. She exhibits no distension. There is no tenderness. There is no rebound and no guarding.   Musculoskeletal: She exhibits edema (bilateral hands) and tenderness.   Neurological: She is alert.   Skin: Skin is warm.   Psychiatric: She has a normal mood and affect. Her behavior is normal.       Recent Labs      10/14/17   1940  10/16/17   0415   WBC  7.3  5.0   RBC  4.46  3.94*   HEMOGLOBIN  13.9  12.2   HEMATOCRIT  41.9  36.8*   MCV  93.9  93.4   MCH  31.2  31.0   MCHC  33.2*  33.2*   RDW  44.0  43.3   PLATELETCT  334  323   MPV  9.8  9.7     Recent Labs      10/14/17   1940  10/16/17   0415   SODIUM  140  140   POTASSIUM  3.6  3.7   CHLORIDE  111  108   CO2  21  24   GLUCOSE  80  94   BUN  7*  6*   CREATININE  0.50  0.51   CALCIUM  8.8  8.6                      Assessment/Plan     * Seronegative rheumatoid arthritis (CMS-[facility])   Assessment & Plan    Presented with headache but this is chronic, primarily she is concerned more about her worsening arthralgias and myalgias.  Swollen hands L>R; had biopsy on scabs  She has seronegative rheumatoid arthritis and follows Dr. Andrade, Rheumatology, [phone] as well as Dr. Mahoney, Pain clinic.  She has been on steroids before and has not been started on DMARDs yet; she mentions she is planned for liver biipsy  Obtain ESR and CRP to see if this is a flare as if it is will consider steroids and contact Dr. Andrade.  ESR CRP negative. She and her  mentioned Dr. Andrade also referred them to a hematologist. Will consult   Raymond, Rheumatology 9/15 per patient request.  10/16. I spoke with Dr. Andrade, Rheumatologist. Reviewed biopsy, positive for undetermined vasculopathy. She mentioned antiphospholipid panel negative. I ordered protein S, C and antithrombin 3 at her request. AT this point because it is not a vasculitis on biopsy, NO steroids for now. I ordered CT oif hands and wrists. She will see her. I spoke with Dr. Mahoney. He mentioned they are simply taking care of her headaches and has an occipital nerve block scheduled. She had received botox. For her other complaints of pain, he thinks it might be overuse of analgesics. At this point, he recommended tapering him off IV narcotics and simply follow up to his clinic.        Intractable pain   Assessment & Plan     I spoke with Dr. Mahoney. He mentioned they are simply taking care of her headaches and has an occipital nerve block scheduled. She had received botox. For her other complaints of pain, he thinks it might be overuse of analgesics. At this point, he recommended tapering him off IV narcotics and simply follow up to his clinic.        History of skin ulcer   Assessment & Plan    Being followed in the outpatient by Dr. 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  No actual vasculitis. Per Dr. Andrade's notes. Her anticardiolipin and beta 2 glycoprotein were negative in 9/15/2017        Migraine   Assessment & Plan    Chronic headaches, she said this is nothing new   I spoke with Dr. Mahoney. He mentioned they are simply taking care of her headaches and has an occipital nerve block scheduled. She had received botox. For her other complaints of pain, he thinks it might be overuse of analgesics. At this point, he recommended tapering him off IV narcotics and simply follow up to his clinic.        S/P  shunt- (present on admission)   Assessment & Plan    Follows Dr. Berry  Last head CT  was recent, on Oct 10, showing no hydrocephalus.  Repeat head CT if headaches become severe.        I spent 1000 minutes, reviewing the chart, notes, vitals, labs, imaging, ordering labs, evaluating Enedelia Pang for assessment, enacting the plan above. 50% of the time was spent in counseling Enedelia Pang answering questions. Discussed with Dr. Andrade, Dr. Mahoney at length. Medical decision making is therefore complex. Time was devoted to counseling and coordinating care including review of records, pertinent lab data and studies, as well as discussing diagnostic evaluation and work up, planned therapeutic interventions and future disposition of care. Where indicated, the assessment and plan reflect discussion of patient with consultants, other healthcare providers, family members, and additional research needed to obtain further information in formulating the plan of care for Enedelia Pang.       DVT prophylaxis - mechanical:  SCDs

## 2017-10-17 NOTE — PROGRESS NOTES
Report received from night RN. Assumed patient care at 0700. Patient is AAOx4. Labs and orders reviewed. Assessment completed. Patient states having  pain 9 out of 10 at this time. Patient medicated, refer to MAR.  Patient provided with scheduled medication, refer to MAR. PICC line in place, postive for blood return. Plan of care discussed with patient; questions have been answered at this time. Patient needs have been met at this time. Patient encouraged to call when needing assistance. Call light within reach. Treaded socks in place. Bed locked and in the lowest position. Hourly rounding in place.

## 2017-10-17 NOTE — PROGRESS NOTES
Neisha Logan Fall Risk Assessment:     Last Known Fall: Within the last year  Mobility: No limitations  Medications: Cardiovascular and central nervous system meds  Mental Status/LOC/Awareness: Awake, alert, and oriented to date, place, and person  Toileting Needs: No needs  Volume/Electrolyte Status: Use of IV fluids/tube feeds  Communication/Sensory: No deficits  Behavior: Depression/anxiety  Neisha Logan Fall Risk Total: 8  Fall Risk Level: LOW RISK    Universal Fall Precautions:  call light/belongings in reach, bed in low position and locked, siderails up x 2, use non-slip footwear, adequate lighting, clutter free and spill free environment, educate on level of risk, educate to call for assistance    Fall Risk Level Interventions:   TRIAL (TELE 8, NEURO, MED MAGDALENA 5) Low Fall Risk Interventions  Place yellow fall risk ID band on patient: verified  Provide patient/family education based on risk assessment: completed  Educate patient/family to call staff for assistance when getting out of bed: completed  Place fall precaution signage outside patient door: verified      Patient Specific Interventions:     Medication: review medications with patient and family and limit combination of prn medications  Mental Status/LOC/Awareness: check on patient hourly and reinforce the use of call light  Toileting: provide frquent toileting, instruct patient/family on the use of grab bars and instruct patient/family on the need to call for assistance when toileting  Volume/Electrolyte Status: ensure patient remains hydrated, monitor abnormal lab values and ensure IV fluids are appropriate  Communication/Sensory: update plan of care on whiteboard  Behavioral: not applicable  Mobility: ensure bed is locked and in lowest position and provide appropriate assistive device

## 2017-10-17 NOTE — CARE PLAN
Problem: Safety  Goal: Will remain free from falls    Intervention: Assess risk factors for falls  Environment is clutter free. Bedside table and personal possession near patient. Treaded socks in place. Bed locked and in the lowest position. Patient encouraged to call when needing assistance. Call light within reach.       Problem: Infection  Goal: Will remain free from infection    Intervention: Assess signs and symptoms of infection  Assessing patient for s/s of infection. Standard precaution and aseptic technique used while providing care to patient. Monitoring vital signs and lab values.

## 2017-10-17 NOTE — ASSESSMENT & PLAN NOTE
Previous physician discussed case with Dr. Mahoney. He mentioned they are simply taking care of her headaches and has an occipital nerve block scheduled. She had received botox  Wean off narcotics, however patient went hysterical when we talked about de-escalation.

## 2017-10-17 NOTE — PROGRESS NOTES
"Assumed patient care at 1900. POC discussed w/ day nurse and pt, pt in agreement w/ goals. Pt AOx4. Pt states \"generalized\" pain, given scheduled MS contin and oxy 10 and dilaudid Q3 prn. Pt denies nausea. PICC in place, + blood return, IV fluids running. Coban placed on patients hands to reduce swelling per request. Pt up SBA. Patient educated on use of call light, hourly rounding, and pain scale. Personal possession and call light within reach. Bed alarm refused. Patient calls appropriately.     "

## 2017-10-17 NOTE — PROGRESS NOTES
Neisha Logan Fall Risk Assessment:     Last Known Fall: Within the last six months  Mobility: No limitations  Medications: Cardiovascular and central nervous system meds  Mental Status/LOC/Awareness: Awake, alert, and oriented to date, place, and person  Toileting Needs: No needs  Volume/Electrolyte Status: Use of IV fluids/tube feeds  Communication/Sensory: Visual (Glasses)/hearing deficit  Behavior: Appropriate behavior  Neisha Logan Fall Risk Total: 9  Fall Risk Level: LOW RISK    Universal Fall Precautions:  call light/belongings in reach, bed in low position and locked, siderails up x 2, use non-slip footwear, adequate lighting, clutter free and spill free environment, educate on level of risk, educate to call for assistance    Fall Risk Level Interventions:   TRIAL (TELE 8, NEURO, MED MAGDALENA 5) Low Fall Risk Interventions  Place yellow fall risk ID band on patient: verified  Provide patient/family education based on risk assessment: completed  Educate patient/family to call staff for assistance when getting out of bed: completed  Place fall precaution signage outside patient door: verified      Patient Specific Interventions:     Medication: review medications with patient and family and assess for medications that can be discontinued or dosage decreased  Mental Status/LOC/Awareness: check on patient hourly and reinforce the use of call light  Toileting: provide frquent toileting and instruct patient/family on the need to call for assistance when toileting  Volume/Electrolyte Status: ensure patient remains hydrated, monitor abnormal lab values and ensure IV fluids are appropriate  Communication/Sensory: update plan of care on whiteboard, ensure proper positioning when transferrng/ambulating and ensure patient has glasses/contacts and hearing aids/dentures  Behavioral: encourage patient to voice feelings and administer medication as ordered  Mobility: provide comfort measures during transport, ensure bed is  locked and in lowest position and instruct patient to exit bed on their strongest side

## 2017-10-17 NOTE — CARE PLAN
Problem: Safety  Goal: Will remain free from injury  Bed locked and in lowest position, call light and personal belongings within reach, pt educated to call for assistance. Bed alarm refused, calls appropriately. Hourly rounding in effect.       Problem: Pain Management  Goal: Pain level will decrease to patient's comfort goal  Patient given scheduled and prn pain medication. Provided with distraction and emotional support.

## 2017-10-17 NOTE — PROGRESS NOTES
[facility]    Date of Service: 10/17/2017    Chief Complaint  47 y.o. female admitted 10/14/2017 with Migraine; Hand Swelling; and Body Aches    Interval Problem Update  No acute issues overnight patient still has bilateral hand swelling and discomfort, says her migraine headaches have improved, condition to abdominal discomfort which has nearly resolved.  Pending coagulation panel including antithrombin III, protein S and C  Rheumatology following.  Pending CT of her hand and wrist    Consultants/Specialty  Dr. Andrade, Rheumatology  Dr. Mahoney    Disposition  TBD        Review of Systems   Constitutional: Positive for malaise/fatigue. Negative for chills and fever.   HENT: Negative for congestion, ear pain, hearing loss, nosebleeds, sore throat and tinnitus.    Eyes: Negative for blurred vision, double vision, photophobia, pain and redness.   Respiratory: Negative for cough, hemoptysis, sputum production, shortness of breath, wheezing and stridor.    Cardiovascular: Negative for chest pain, palpitations, orthopnea, claudication and PND.   Gastrointestinal: Negative for abdominal pain, blood in stool, constipation, diarrhea, heartburn, melena, nausea and vomiting.   Genitourinary: Negative for dysuria, frequency, hematuria and urgency.   Musculoskeletal: Positive for myalgias. Negative for back pain, falls and neck pain.   Skin: Negative for rash.   Neurological: Negative for dizziness, tingling, tremors, sensory change, speech change, focal weakness, seizures, loss of consciousness, weakness and headaches.   Psychiatric/Behavioral: Negative for depression, memory loss and suicidal ideas. The patient is not nervous/anxious and does not have insomnia.    All other systems reviewed and are negative.     Physical Exam  Laboratory/Imaging   Hemodynamics  Temp (24hrs), Av.5 °C (97.7 °F), Min:36.3 °C (97.4 °F), Max:36.6 °C (97.9 °F)   Temperature: 36.6 °C (97.8 °F)  Pulse  Av.3  Min: 79  Max:  119    Blood Pressure: 107/68      Respiratory      Respiration: 20, Pulse Oximetry: 97 %             Fluids    Intake/Output Summary (Last 24 hours) at 10/17/17 1109  Last data filed at 10/17/17 0600   Gross per 24 hour   Intake             1740 ml   Output                0 ml   Net             1740 ml       Nutrition  Orders Placed This Encounter   Procedures   • Diet Order     Standing Status:   Standing     Number of Occurrences:   1     Order Specific Question:   Diet:     Answer:   Regular [1]     Physical Exam   Constitutional: She appears well-developed and well-nourished.   HENT:   Head: Normocephalic and atraumatic.   Eyes: Conjunctivae and EOM are normal. No scleral icterus.   Neck: Normal range of motion. Neck supple.   Cardiovascular: Normal rate and regular rhythm.  Exam reveals no gallop and no friction rub.    No murmur heard.  Pulmonary/Chest: Effort normal and breath sounds normal. No respiratory distress. She has no wheezes. She has no rales.   Abdominal: Soft. Bowel sounds are normal. She exhibits no distension. There is no tenderness. There is no rebound and no guarding.   Musculoskeletal: She exhibits edema (bilateral hands and wrists) and tenderness (joint tenderness bilateral hands PCP and DIP joints).   Neurological: She is alert.   Skin: Skin is warm.   Psychiatric: She has a normal mood and affect. Her behavior is normal.       Recent Labs      10/14/17   1940  10/16/17   0415  10/17/17   0020   WBC  7.3  5.0  6.5   RBC  4.46  3.94*  3.82*   HEMOGLOBIN  13.9  12.2  11.7*   HEMATOCRIT  41.9  36.8*  35.5*   MCV  93.9  93.4  92.9   MCH  31.2  31.0  30.6   MCHC  33.2*  33.2*  33.0*   RDW  44.0  43.3  42.5   PLATELETCT  334  323  321   MPV  9.8  9.7  9.6     Recent Labs      10/14/17   1940  10/16/17   0415   SODIUM  140  140   POTASSIUM  3.6  3.7   CHLORIDE  111  108   CO2  21  24   GLUCOSE  80  94   BUN  7*  6*   CREATININE  0.50  0.51   CALCIUM  8.8  8.6                      Assessment/Plan      * Seronegative rheumatoid arthritis (CMS-[facility])   Assessment & Plan    Presented with headache but this is chronic, primarily she is concerned more about her worsening arthralgias and myalgias.  Swollen hands L>R; had biopsy on scabs  She has seronegative rheumatoid arthritis and follows Dr. Andrade, Rheumatology, [phone] as well as Dr. Mahoney, Pain clinic.  She has been on steroids before and has not been started on DMARDs yet; she mentions she is planned for liver biipsy  Obtain ESR and CRP to see if this is a flare as if it is will consider steroids and contact Dr. Andrade.  ESR CRP negative. She and her  mentioned Dr. Andrade also referred them to a hematologist. Will consult Dr. Andrade, Rheumatology 9/15 per patient request.  10/16. I spoke with Dr. Andrade, Rheumatologist. Reviewed biopsy, positive for undetermined vasculopathy. She mentioned antiphospholipid panel negative. I ordered protein S, C and antithrombin 3 at her request. AT this point because it is not a vasculitis on biopsy, NO steroids for now. I ordered CT oif hands and wrists. She will see her. I spoke with Dr. Mahoney. He mentioned they are simply taking care of her headaches and has an occipital nerve block scheduled. She had received botox. For her other complaints of pain, he thinks it might be overuse of analgesics. At this point, he recommended tapering him off IV narcotics and simply follow up to his clinic.    10/17  Pending coagulation workup at this point. Once results are and we'll discuss with hematology.        Intractable pain   Assessment & Plan    Previous physician discussed case with Dr. Mahoney. He mentioned they are simply taking care of her headaches and has an occipital nerve block scheduled. She had received botox  Additionally recommended weaning off narcotics to avoid rebound headaches.        History of skin ulcer   Assessment & Plan    Being followed in the outpatient by Dr. 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  No actual vasculitis. Per Dr. Andrade's notes. Her anticardiolipin and beta 2 glycoprotein were negative in 9/15/2017  Healing overall, continue wound care        Migraine   Assessment & Plan    Chronic headaches, not any worse than her usual  Per Dr. Mahoney. Recommended transitioning off of narcotics  Continue with Toradol and Tylenol only.        S/P  shunt- (present on admission)   Assessment & Plan    Follows Dr. Berry  Last head CT was recent, on Oct 10, showing no hydrocephalus.  Repeat head CT if headaches become severe.          The total time spent face to face with this patient was about 38 mins of which 60% of time was spent on counseling, review of records including pertinent lab data and studies, as well as discussing diagnostic evaluation and work up, planned therapeutic interventions and future disposition of care. Where indicated, the assessment and plan reflect discussion of patient with consultants, other healthcare providers, family members, and additional research needed to obtain further information in formulating the plan of care of this patient.         DVT prophylaxis - mechanical:  SCDs

## 2017-10-17 NOTE — ASSESSMENT & PLAN NOTE
Follows Dr. Berry  Last head CT was recent, on Oct 10, showing no hydrocephalus.  Repeat head CT if headaches become severe.

## 2017-10-18 ENCOUNTER — APPOINTMENT (OUTPATIENT)
Dept: RADIOLOGY | Facility: MEDICAL CENTER | Age: 47
DRG: 545 | End: 2017-10-18
Attending: HOSPITALIST
Payer: MEDICARE

## 2017-10-18 LAB
ALBUMIN SERPL BCP-MCNC: 3.9 G/DL (ref 3.2–4.9)
ALBUMIN/GLOB SERPL: 1.5 G/DL
ALP SERPL-CCNC: 94 U/L (ref 30–99)
ALT SERPL-CCNC: 20 U/L (ref 2–50)
ANION GAP SERPL CALC-SCNC: 9 MMOL/L (ref 0–11.9)
AST SERPL-CCNC: 15 U/L (ref 12–45)
BACTERIA WND AEROBE CULT: NORMAL
BILIRUB SERPL-MCNC: 0.2 MG/DL (ref 0.1–1.5)
BUN SERPL-MCNC: 10 MG/DL (ref 8–22)
CALCIUM SERPL-MCNC: 9.4 MG/DL (ref 8.5–10.5)
CHLORIDE SERPL-SCNC: 106 MMOL/L (ref 96–112)
CO2 SERPL-SCNC: 26 MMOL/L (ref 20–33)
CREAT SERPL-MCNC: 0.63 MG/DL (ref 0.5–1.4)
ERYTHROCYTE [DISTWIDTH] IN BLOOD BY AUTOMATED COUNT: 43 FL (ref 35.9–50)
GFR SERPL CREATININE-BSD FRML MDRD: >60 ML/MIN/1.73 M 2
GLOBULIN SER CALC-MCNC: 2.6 G/DL (ref 1.9–3.5)
GLUCOSE SERPL-MCNC: 140 MG/DL (ref 65–99)
GRAM STN SPEC: NORMAL
HCT VFR BLD AUTO: 36.8 % (ref 37–47)
HGB BLD-MCNC: 12.3 G/DL (ref 12–16)
MAGNESIUM SERPL-MCNC: 1.6 MG/DL (ref 1.5–2.5)
MCH RBC QN AUTO: 31.2 PG (ref 27–33)
MCHC RBC AUTO-ENTMCNC: 33.4 G/DL (ref 33.6–35)
MCV RBC AUTO: 93.4 FL (ref 81.4–97.8)
PLATELET # BLD AUTO: 324 K/UL (ref 164–446)
PMV BLD AUTO: 9.9 FL (ref 9–12.9)
POTASSIUM SERPL-SCNC: 3.8 MMOL/L (ref 3.6–5.5)
PROT SERPL-MCNC: 6.5 G/DL (ref 6–8.2)
RBC # BLD AUTO: 3.94 M/UL (ref 4.2–5.4)
SIGNIFICANT IND 70042: NORMAL
SITE SITE: NORMAL
SODIUM SERPL-SCNC: 141 MMOL/L (ref 135–145)
SOURCE SOURCE: NORMAL
WBC # BLD AUTO: 5.5 K/UL (ref 4.8–10.8)

## 2017-10-18 PROCEDURE — 700111 HCHG RX REV CODE 636 W/ 250 OVERRIDE (IP): Performed by: INTERNAL MEDICINE

## 2017-10-18 PROCEDURE — 73200 CT UPPER EXTREMITY W/O DYE: CPT | Mod: RT

## 2017-10-18 PROCEDURE — 700102 HCHG RX REV CODE 250 W/ 637 OVERRIDE(OP): Performed by: HOSPITALIST

## 2017-10-18 PROCEDURE — 770006 HCHG ROOM/CARE - MED/SURG/GYN SEMI*

## 2017-10-18 PROCEDURE — 85027 COMPLETE CBC AUTOMATED: CPT

## 2017-10-18 PROCEDURE — 80053 COMPREHEN METABOLIC PANEL: CPT

## 2017-10-18 PROCEDURE — 83735 ASSAY OF MAGNESIUM: CPT

## 2017-10-18 PROCEDURE — A9270 NON-COVERED ITEM OR SERVICE: HCPCS | Performed by: INTERNAL MEDICINE

## 2017-10-18 PROCEDURE — 700111 HCHG RX REV CODE 636 W/ 250 OVERRIDE (IP): Performed by: HOSPITALIST

## 2017-10-18 PROCEDURE — 99232 SBSQ HOSP IP/OBS MODERATE 35: CPT | Performed by: HOSPITALIST

## 2017-10-18 PROCEDURE — 700102 HCHG RX REV CODE 250 W/ 637 OVERRIDE(OP): Performed by: INTERNAL MEDICINE

## 2017-10-18 PROCEDURE — A9270 NON-COVERED ITEM OR SERVICE: HCPCS | Performed by: HOSPITALIST

## 2017-10-18 PROCEDURE — 700105 HCHG RX REV CODE 258: Performed by: HOSPITALIST

## 2017-10-18 RX ORDER — OXYCODONE HYDROCHLORIDE 10 MG/1
10 TABLET ORAL
Status: DISCONTINUED | OUTPATIENT
Start: 2017-10-18 | End: 2017-10-19 | Stop reason: HOSPADM

## 2017-10-18 RX ORDER — MAGNESIUM SULFATE HEPTAHYDRATE 40 MG/ML
2 INJECTION, SOLUTION INTRAVENOUS ONCE
Status: COMPLETED | OUTPATIENT
Start: 2017-10-18 | End: 2017-10-18

## 2017-10-18 RX ORDER — OXYCODONE HYDROCHLORIDE 5 MG/1
5 TABLET ORAL
Status: DISCONTINUED | OUTPATIENT
Start: 2017-10-18 | End: 2017-10-19 | Stop reason: HOSPADM

## 2017-10-18 RX ADMIN — PREDNISONE 40 MG: 20 TABLET ORAL at 08:27

## 2017-10-18 RX ADMIN — HYDROMORPHONE HYDROCHLORIDE 1 MG: 1 INJECTION, SOLUTION INTRAMUSCULAR; INTRAVENOUS; SUBCUTANEOUS at 23:16

## 2017-10-18 RX ADMIN — GABAPENTIN 600 MG: 300 CAPSULE ORAL at 16:10

## 2017-10-18 RX ADMIN — OXYCODONE HYDROCHLORIDE 10 MG: 10 TABLET ORAL at 00:29

## 2017-10-18 RX ADMIN — HYDROMORPHONE HYDROCHLORIDE 1 MG: 1 INJECTION, SOLUTION INTRAMUSCULAR; INTRAVENOUS; SUBCUTANEOUS at 05:09

## 2017-10-18 RX ADMIN — MAGNESIUM SULFATE IN WATER 2 G: 40 INJECTION, SOLUTION INTRAVENOUS at 08:31

## 2017-10-18 RX ADMIN — DIPHENHYDRAMINE HYDROCHLORIDE 50 MG: 50 INJECTION, SOLUTION INTRAMUSCULAR; INTRAVENOUS at 03:48

## 2017-10-18 RX ADMIN — HYDROMORPHONE HYDROCHLORIDE 1 MG: 1 INJECTION, SOLUTION INTRAMUSCULAR; INTRAVENOUS; SUBCUTANEOUS at 13:47

## 2017-10-18 RX ADMIN — GABAPENTIN 600 MG: 300 CAPSULE ORAL at 22:04

## 2017-10-18 RX ADMIN — OMEPRAZOLE 20 MG: 20 CAPSULE, DELAYED RELEASE ORAL at 08:27

## 2017-10-18 RX ADMIN — SODIUM CHLORIDE: 9 INJECTION, SOLUTION INTRAVENOUS at 00:30

## 2017-10-18 RX ADMIN — OXYCODONE HYDROCHLORIDE 10 MG: 10 TABLET ORAL at 11:37

## 2017-10-18 RX ADMIN — KETOROLAC TROMETHAMINE 60 MG: 30 INJECTION, SOLUTION INTRAMUSCULAR at 18:38

## 2017-10-18 RX ADMIN — DULOXETINE HYDROCHLORIDE 60 MG: 60 CAPSULE, DELAYED RELEASE ORAL at 22:04

## 2017-10-18 RX ADMIN — HYDROMORPHONE HYDROCHLORIDE 1 MG: 1 INJECTION, SOLUTION INTRAMUSCULAR; INTRAVENOUS; SUBCUTANEOUS at 01:38

## 2017-10-18 RX ADMIN — DIPHENHYDRAMINE HYDROCHLORIDE 50 MG: 50 INJECTION, SOLUTION INTRAMUSCULAR; INTRAVENOUS at 09:53

## 2017-10-18 RX ADMIN — RISPERIDONE 1 MG: 1 TABLET, FILM COATED ORAL at 11:37

## 2017-10-18 RX ADMIN — SODIUM CHLORIDE: 9 INJECTION, SOLUTION INTRAVENOUS at 08:26

## 2017-10-18 RX ADMIN — STANDARDIZED SENNA CONCENTRATE AND DOCUSATE SODIUM 2 TABLET: 8.6; 5 TABLET, FILM COATED ORAL at 20:50

## 2017-10-18 RX ADMIN — DULOXETINE HYDROCHLORIDE 60 MG: 60 CAPSULE, DELAYED RELEASE ORAL at 11:37

## 2017-10-18 RX ADMIN — OXYCODONE HYDROCHLORIDE 10 MG: 10 TABLET ORAL at 19:24

## 2017-10-18 RX ADMIN — DIPHENHYDRAMINE HYDROCHLORIDE 50 MG: 50 INJECTION, SOLUTION INTRAMUSCULAR; INTRAVENOUS at 22:04

## 2017-10-18 RX ADMIN — STANDARDIZED SENNA CONCENTRATE AND DOCUSATE SODIUM 2 TABLET: 8.6; 5 TABLET, FILM COATED ORAL at 08:27

## 2017-10-18 RX ADMIN — MORPHINE SULFATE 15 MG: 15 TABLET, EXTENDED RELEASE ORAL at 08:27

## 2017-10-18 RX ADMIN — GABAPENTIN 600 MG: 300 CAPSULE ORAL at 05:09

## 2017-10-18 RX ADMIN — HYDROMORPHONE HYDROCHLORIDE 1 MG: 1 INJECTION, SOLUTION INTRAMUSCULAR; INTRAVENOUS; SUBCUTANEOUS at 09:53

## 2017-10-18 RX ADMIN — RISPERIDONE 1 MG: 1 TABLET, FILM COATED ORAL at 08:27

## 2017-10-18 RX ADMIN — KETOROLAC TROMETHAMINE 60 MG: 30 INJECTION, SOLUTION INTRAMUSCULAR at 06:33

## 2017-10-18 RX ADMIN — DIPHENHYDRAMINE HYDROCHLORIDE 50 MG: 50 INJECTION, SOLUTION INTRAMUSCULAR; INTRAVENOUS at 16:10

## 2017-10-18 RX ADMIN — RISPERIDONE 1 MG: 1 TABLET, FILM COATED ORAL at 22:04

## 2017-10-18 RX ADMIN — ASPIRIN 81 MG: 81 TABLET, CHEWABLE ORAL at 08:27

## 2017-10-18 RX ADMIN — OXYCODONE HYDROCHLORIDE 10 MG: 10 TABLET ORAL at 16:10

## 2017-10-18 RX ADMIN — MORPHINE SULFATE 15 MG: 15 TABLET, EXTENDED RELEASE ORAL at 20:50

## 2017-10-18 ASSESSMENT — ENCOUNTER SYMPTOMS
NAUSEA: 0
FEVER: 0
EYE PAIN: 0
SHORTNESS OF BREATH: 0
TREMORS: 0
SEIZURES: 0
COUGH: 0
DOUBLE VISION: 0
SENSORY CHANGE: 0
CONSTIPATION: 0
EYE REDNESS: 0
ORTHOPNEA: 0
BLURRED VISION: 0
MYALGIAS: 1
ABDOMINAL PAIN: 0
NERVOUS/ANXIOUS: 1
SORE THROAT: 0
PHOTOPHOBIA: 0
CHILLS: 0
WEAKNESS: 0
INSOMNIA: 0
NECK PAIN: 0
CLAUDICATION: 0
BLOOD IN STOOL: 0
VOMITING: 0
PALPITATIONS: 0
HEADACHES: 0
HEMOPTYSIS: 0
SPEECH CHANGE: 0
BACK PAIN: 1
LOSS OF CONSCIOUSNESS: 0
DEPRESSION: 0
TINGLING: 0
WHEEZING: 0
SPUTUM PRODUCTION: 0
MEMORY LOSS: 0
STRIDOR: 0
DIARRHEA: 0
PND: 0
FALLS: 0
DIZZINESS: 0
HEARTBURN: 0
FOCAL WEAKNESS: 0

## 2017-10-18 ASSESSMENT — PAIN SCALES - GENERAL
PAINLEVEL_OUTOF10: 9
PAINLEVEL_OUTOF10: 7
PAINLEVEL_OUTOF10: 8
PAINLEVEL_OUTOF10: 6
PAINLEVEL_OUTOF10: 10
PAINLEVEL_OUTOF10: 9
PAINLEVEL_OUTOF10: 8
PAINLEVEL_OUTOF10: 7
PAINLEVEL_OUTOF10: 6
PAINLEVEL_OUTOF10: 7
PAINLEVEL_OUTOF10: 8
PAINLEVEL_OUTOF10: 7
PAINLEVEL_OUTOF10: 7
PAINLEVEL_OUTOF10: 8

## 2017-10-18 NOTE — DISCHARGE PLANNING
Late Entry from 10/13/17 - Received referral to see patient re: Advance Directives along with Certificate of Competency from [employer], . Met with patient - answer questions, completed document and notarized. Will scan into Epic. GIGI Gabriel - [phone]

## 2017-10-18 NOTE — PROGRESS NOTES
"Assumed care of patient @ 0700, report received at bedside,  assessment done, labs and orders noted.  Pt A & Ox4, PIV running maintenance fluids as per MAR.  Pt is resting comfortably in bed, no signs or symptoms of distress.  Pt reports pain is a 9/10, more generalized muscle pain \" pt states it is like acid running through her veins\", pt requested dilaudid.  Scheduled morphine is ordered.  Discussed with pt utilizing oral pain meds for durational relief and then utilizing dilaudid for breakthrough.  Pt began to cry and stated it wasn't working.  Asked pt to attempt and to work with RN.  RN will reassess pain as needed, dilaudid wasted with Charge RN and will revisit in one hour.  Pt has been updated on the plan of care.    Bed alarm is off, bed is in lowest position, fall risk socks in place, call light within reach. Pt verbalized all needs are met at this time.  "

## 2017-10-18 NOTE — CARE PLAN
Problem: Nutritional:  Goal: Achieve adequate nutritional intake  Patient will consume 50% of meals   Outcome: MET Date Met: 10/18/17  Patient eating well with % of meals consumed recently.   RD is available PRN.

## 2017-10-18 NOTE — PROGRESS NOTES
Received bedside report from day RN and assumed care of patient at 1900. Patient is alert and oriented x4 with no signs of labored breathing or distress. Safety precautions in place including patient call light within reach, personal possessions nearby, bed in low position and locked, hourly rounding in practice, and non-skid socks in place.

## 2017-10-18 NOTE — CARE PLAN
Problem: Pain Management  Goal: Pain level will decrease to patient's comfort goal  Outcome: PROGRESSING AS EXPECTED  Pt concerned regarding pain meds this morning.  Discussed plan to have pt try oral pain meds and use IV for breakthrough.  Pt copasetic, but exhibits anxiety and is tearful.     Problem: Mobility  Goal: Risk for activity intolerance will decrease  Outcome: PROGRESSING AS EXPECTED  Pt is up self, but states she has increased pain with movement.  Has not ambulated despite encouragement and reminders.

## 2017-10-18 NOTE — PROGRESS NOTES
Neisha Logan Fall Risk Assessment:     Last Known Fall: Within the last six months  Mobility: No limitations  Medications: Cardiovascular and central nervous system meds  Mental Status/LOC/Awareness: Awake, alert, and oriented to date, place, and person  Toileting Needs: No needs  Volume/Electrolyte Status: Use of IV fluids/tube feeds  Communication/Sensory: Visual (Glasses)/hearing deficit  Behavior: Appropriate behavior  Neisha Logan Fall Risk Total: 9  Fall Risk Level: LOW RISK    Universal Fall Precautions:  call light/belongings in reach, bed in low position and locked, siderails up x 2, use non-slip footwear, adequate lighting, clutter free and spill free environment, educate on level of risk, educate to call for assistance    Fall Risk Level Interventions:   TRIAL (TELE 8, NEURO, MED MAGDALENA 5) Low Fall Risk Interventions  Place yellow fall risk ID band on patient: verified  Provide patient/family education based on risk assessment: completed  Educate patient/family to call staff for assistance when getting out of bed: completed  Place fall precaution signage outside patient door: verified      Patient Specific Interventions:     Medication: limit combination of prn medications  Mental Status/LOC/Awareness: check on patient hourly and reinforce the use of call light  Toileting: not applicable  Volume/Electrolyte Status: monitor abnormal lab values and ensure IV fluids are appropriate  Communication/Sensory: update plan of care on whiteboard  Behavioral: engage patient in daily activities, administer medication as ordered and instruct/reinforce fall program rationale  Mobility: ensure bed is locked and in lowest position and instruct patient to exit bed on their strongest side

## 2017-10-18 NOTE — PROGRESS NOTES
INTERVAL HISTORY    Started prednisone 40 mg yesterday (10/17/18)  Patient still is requesting for immediate pain relief with diluadid per nursing notes  She does feel her burning myalgias are not as bad but improvement she feels may be marginal        PHYSICAL EXAM    Vitals:    10/17/17 1604 10/17/17 1905 10/18/17 0400 10/18/17 0725   BP: 123/61 122/76 131/85 117/62   Pulse: 92 86 (!) 103 73   Resp: 19 18 18 16   Temp: 36.4 °C (97.6 °F) 36.7 °C (98 °F) 36.3 °C (97.4 °F) 36.9 °C (98.4 °F)   TempSrc:       SpO2: 98% 94% 94% 93%   Weight:       Height:        Body mass index is 28.47 kg/m².    General/Constitutional: NAD not diaphoretic  Eyes: clear conjunctiva, no scleral icterus, EOMI  Ears, Nose, Mouth,Throat: no oral ulcers, moist mucous membranes, no discharge from ears bilaterally  Respiratory: normal effort, unlabored respiration.    Musculoskeletal  Axial:  Thoracic: no kyphosis  Upper Extremities:  Her hands were wrapped overnight. Left hand has more swelling but on exam there does appear to be some mild bogginess at the PIP (2nd and 3rd).  Right hand appears improved but also mild bogginess at the 3rd PIP.    Wrists and Elbows have good ROM  Lower Extremities:  No knee effusion bilateral, No crepitus bilateral  No MTP tenderness bilateral  Skin: On the right lower extremity there are diffuse 0.5-0.75 cm ulcerated circular lesions.  On the left lower extremity there scarring appreciate.Limited skin exam.  no rashes, no digital ulcerations, no alopecia, no tophi, no skin thickening, no nodules  Neuro:  Alert, Oriented x 3, moves all four extremities  Psych: normal affect, normal behavior, follows commands, responses are appropriate      Recent Labs      10/16/17   0415  10/17/17   0020  10/18/17   0030   WBC  5.0  6.5  5.5   RBC  3.94*  3.82*  3.94*   HEMOGLOBIN  12.2  11.7*  12.3   HEMATOCRIT  36.8*  35.5*  36.8*   MCV  93.4  92.9  93.4   MCH  31.0  30.6  31.2   RDW  43.3  42.5  43.0   PLATELETCT  323  321   324   MPV  9.7  9.6  9.9       Lab Results   Component Value Date/Time    QNTTBGOLD Negative 01/09/2017 06:01 PM     Lab Results   Component Value Date/Time    HEPBCORIGM Negative 05/21/2017 02:07 AM    HEPBSAG Negative 05/21/2017 02:07 AM     Lab Results   Component Value Date/Time    HEPCAB Negative 05/21/2017 02:07 AM     Lab Results   Component Value Date/Time    SODIUM 141 10/18/2017 12:30 AM    POTASSIUM 3.8 10/18/2017 12:30 AM    CHLORIDE 106 10/18/2017 12:30 AM    CO2 26 10/18/2017 12:30 AM    GLUCOSE 140 (H) 10/18/2017 12:30 AM    BUN 10 10/18/2017 12:30 AM    CREATININE 0.63 10/18/2017 12:30 AM      Lab Results   Component Value Date/Time    WBC 5.5 10/18/2017 12:30 AM    RBC 3.94 (L) 10/18/2017 12:30 AM    HEMOGLOBIN 12.3 10/18/2017 12:30 AM    HEMATOCRIT 36.8 (L) 10/18/2017 12:30 AM    MCV 93.4 10/18/2017 12:30 AM    MCH 31.2 10/18/2017 12:30 AM    MCHC 33.4 (L) 10/18/2017 12:30 AM    MPV 9.9 10/18/2017 12:30 AM    NEUTSPOLYS 77.00 (H) 10/14/2017 07:40 PM    LYMPHOCYTES 17.60 (L) 10/14/2017 07:40 PM    MONOCYTES 4.30 10/14/2017 07:40 PM    EOSINOPHILS 0.00 10/14/2017 07:40 PM    BASOPHILS 0.70 10/14/2017 07:40 PM    HYPOCHROMIA 1+ 01/11/2017 02:29 PM    ANISOCYTOSIS 1+ 01/11/2017 02:29 PM      Lab Results   Component Value Date/Time    CALCIUM 9.4 10/18/2017 12:30 AM    ASTSGOT 15 10/18/2017 12:30 AM    ALTSGPT 20 10/18/2017 12:30 AM    ALKPHOSPHAT 94 10/18/2017 12:30 AM    TBILIRUBIN 0.2 10/18/2017 12:30 AM    ALBUMIN 3.9 10/18/2017 12:30 AM    TOTPROTEIN 6.5 10/18/2017 12:30 AM     Lab Results   Component Value Date/Time    URICACID 4.3 09/15/2017 03:45 PM    RHEUMFACTN <10 10/14/2017 07:40 PM    ANTINUCAB None Detected 01/06/2017 04:12 AM     Lab Results   Component Value Date/Time    SEDRATEWES 11 10/14/2017 07:40 PM    CREACTPROT 0.16 10/14/2017 07:40 PM     Lab Results   Component Value Date/Time    RUSSELVIPER 40.3 09/15/2017 03:45 PM    DRVVTINTERP Not Present 09/15/2017 03:45 PM     Lab Results    Component Value Date/Time    B9QJXOQSIYF 178.0 01/06/2017 04:12 AM    R0SQTBDQFVU 37.0 01/06/2017 04:12 AM    IGGCARDIOLI 5 09/15/2017 03:45 PM    IGMCARDIOLI 6 09/15/2017 03:45 PM    IGACARDIOLI 2 09/15/2017 03:45 PM    CRYOGLOBULIN NEG 72Hour 04/17/2017 03:53 PM     Lab Results   Component Value Date/Time    EYWPTHX26 0 01/06/2017 04:12 AM    CENTROMBAB 2 01/06/2017 04:12 AM     Lab Results   Component Value Date/Time    ANCAIGG <1:20 01/06/2017 04:12 AM    T0CDCTKWIQR 178.0 01/06/2017 04:12 AM     Lab Results   Component Value Date/Time    COLORURINE DK Yellow 10/15/2017 09:40 AM    SPECGRAVITY 1.010 10/15/2017 09:40 AM    PHURINE 5.5 10/15/2017 09:40 AM    GLUCOSEUR Negative 10/15/2017 09:40 AM    KETONES Negative 10/15/2017 09:40 AM    PROTEINURIN Negative 10/15/2017 09:40 AM     No results found for: TOTPROTUR, TOTALVOLUME, WYXDQRZT54  No results found for: SSA60, SSA52  Lab Results   Component Value Date/Time    HBA1C 5.2 04/29/2017 02:09 PM     Lab Results   Component Value Date/Time    CPKTOTAL 79 10/16/2017 04:15 AM     Lab Results   Component Value Date/Time    G6PD 20.4 (H) 01/10/2017 01:54 PM     Lab Results   Component Value Date/Time    ZFIW27RNPC Negative 10/14/2017 07:40 PM     No results found for: ACESERUM  Lab Results   Component Value Date/Time    25HYDROXY 45 01/09/2017 06:00 PM     No results found for: TSH, FREEDIR  Lab Results   Component Value Date/Time    TSHULTRASEN 6.860 (H) 05/21/2017 02:07 AM    FREET4 0.76 01/05/2017 11:22 PM     No results found for: MICROSOMALA, ANTITHYROGL  No results found for: IGGLYMEABS  No results found for: ANTIMITOCHO, FACTIN  No results found for: IGA, TTRANSIGA, ENDOIGA  No results found for: FLTYPE, CRYSTALSBDF  No results found for: ISTATICAL  No results found for: ISTATCREAT  No results found for: CTELOPEP  No results found for: GBMABG  No results found for: PTHINTACT    Assessment and Plan.  Ms. Enedelia Pang presents with diffuse myalgias and  arthralgias.  She was admitted for diffuse myalgia and migraines.  SHe is medically complex with a history of migraines, episodes of transaminitis, recurrent infections and concern for a thrombotic vasculopathy.    I have managed her as a seronegative rheumatoid arthritis based on symptoms of morning stiffness of the hands, swelling, and left hand xray demonstrating periarticular osteopenia.  She has been sensitive to medications and developed transaminitis on methotrexate.  She is high risk for infection and so I have not started anti-TNF therapy.  In addition she has a nasal septal perforation with negative biopsy for infectious process.      She has responded to a course of prednisone in the past and this was started yesterday.  We will continue with a taper and I will see her in clinic at her follow-up in 2-3 weeks.

## 2017-10-18 NOTE — PROGRESS NOTES
"Pt requesting Dilaudid for pain relief. Re-educated pt on titrating off IV pain medication. This RN offered to medicated w/ Oxycodone. Pt refused oxycodone stating pain is high and needs \"instant pain relief now\".   "

## 2017-10-18 NOTE — PROGRESS NOTES
Educated pt about titrating down on IV narcotics and resuming home pain medications only since pt will not be d/c with Dilaudid. Pt verbalized understanding and agreed to use IV Dilaudid for breakthrough pain. Oxycodone PRN was given per MAR for pain.

## 2017-10-18 NOTE — PROGRESS NOTES
[facility]ist Progress Note    Date of Service: 10/18/2017    Chief Complaint  47 y.o. female admitted 10/14/2017 with Migraine; Hand Swelling; and Body Aches    Interval Problem Update  No acute issues overnight patient still has bilateral hand swelling and discomfort, says her migraine headaches have improved, condition to abdominal discomfort which has nearly resolved.  Pending coagulation panel including antithrombin III, protein S and C  Rheumatology following.  Pending CT of her hand and wrist    10/18  Patient overall feels much better since her bilateral hand swelling is also improving.  Still is having pain otherwise denies any chest pain shortness of breath or nausea vomiting  We'll continue her on a prednisone 40 mg daily  Discussed case with Dr. Andrade yesterday who recommended a prolonged steroid taper and for outpatient follow-up visit.  Patient hysterical when we talked about nartocis de-escalation. Long discussion was made with patient to explain reasoning.     Consultants/Specialty  Dr. Andrade, Rheumatology  Dr. Mahoney    Disposition  TBD        Review of Systems   Constitutional: Positive for malaise/fatigue. Negative for chills and fever.   HENT: Negative for congestion, ear pain, hearing loss, nosebleeds, sore throat and tinnitus.    Eyes: Negative for blurred vision, double vision, photophobia, pain and redness.   Respiratory: Negative for cough, hemoptysis, sputum production, shortness of breath, wheezing and stridor.    Cardiovascular: Negative for chest pain, palpitations, orthopnea, claudication and PND.   Gastrointestinal: Negative for abdominal pain, blood in stool, constipation, diarrhea, heartburn, melena, nausea and vomiting.   Genitourinary: Negative for dysuria, frequency, hematuria and urgency.   Musculoskeletal: Positive for back pain and myalgias. Negative for falls and neck pain.   Skin: Negative for rash.   Neurological: Negative for dizziness, tingling, tremors, sensory change,  speech change, focal weakness, seizures, loss of consciousness, weakness and headaches.   Psychiatric/Behavioral: Negative for depression, memory loss and suicidal ideas. The patient is nervous/anxious. The patient does not have insomnia.    All other systems reviewed and are negative.     Physical Exam  Laboratory/Imaging   Hemodynamics  Temp (24hrs), Av.6 °C (97.9 °F), Min:36.3 °C (97.4 °F), Max:36.9 °C (98.4 °F)   Temperature: 36.9 °C (98.4 °F)  Pulse  Av.8  Min: 73  Max: 119    Blood Pressure: 117/62      Respiratory      Respiration: 16, Pulse Oximetry: 93 %             Fluids  No intake or output data in the 24 hours ending 10/18/17 1115    Nutrition  Orders Placed This Encounter   Procedures   • Diet Order     Standing Status:   Standing     Number of Occurrences:   1     Order Specific Question:   Diet:     Answer:   Regular [1]     Physical Exam   Constitutional: She appears well-developed and well-nourished.   HENT:   Head: Normocephalic and atraumatic.   Eyes: Conjunctivae and EOM are normal. No scleral icterus.   Neck: Normal range of motion. Neck supple.   Cardiovascular: Normal rate and regular rhythm.  Exam reveals no gallop and no friction rub.    No murmur heard.  Pulmonary/Chest: Effort normal and breath sounds normal. No respiratory distress. She has no wheezes. She has no rales.   Abdominal: Soft. Bowel sounds are normal. She exhibits no distension. There is no tenderness. There is no rebound and no guarding.   Musculoskeletal: She exhibits edema (bilateral hands and wrists improving actually) and tenderness (joint tenderness bilateral hands PCP and DIP joints decreasing).   Neurological: She is alert.   Skin: Skin is warm.   Psychiatric: Her behavior is normal. Thought content normal.   Anxious        Recent Labs      10/16/17   0415  10/17/17   0020  10/18/17   0030   WBC  5.0  6.5  5.5   RBC  3.94*  3.82*  3.94*   HEMOGLOBIN  12.2  11.7*  12.3   HEMATOCRIT  36.8*  35.5*  36.8*   MCV   93.4  92.9  93.4   MCH  31.0  30.6  31.2   MCHC  33.2*  33.0*  33.4*   RDW  43.3  42.5  43.0   PLATELETCT  323  321  324   MPV  9.7  9.6  9.9     Recent Labs      10/16/17   0415  10/18/17   0030   SODIUM  140  141   POTASSIUM  3.7  3.8   CHLORIDE  108  106   CO2  24  26   GLUCOSE  94  140*   BUN  6*  10   CREATININE  0.51  0.63   CALCIUM  8.6  9.4                      Assessment/Plan     * Seronegative rheumatoid arthritis ([facility])   Assessment & Plan    Presented with headache but this is chronic, primarily she is concerned more about her worsening arthralgias and myalgias.  Swollen hands L>R; had biopsy on scabs  She has seronegative rheumatoid arthritis and follows Dr. Andrade, Rheumatology, [phone] as well as Dr. Mahoney, Pain clinic.  She has been on steroids before and has not been started on DMARDs yet; she mentions she is planned for liver biipsy  Obtain ESR and CRP to see if this is a flare as if it is will consider steroids and contact Dr. Andrade.  ESR CRP negative. She and her  mentioned Dr. Andrade also referred them to a hematologist. Will consult Dr. Andrade, Rheumatology 9/15 per patient request.  10/16. I spoke with Dr. Andrade, Rheumatologist. Reviewed biopsy, positive for undetermined vasculopathy. She mentioned antiphospholipid panel negative. I ordered protein S, C and antithrombin 3 at her request. AT this point because it is not a vasculitis on biopsy, NO steroids for now. I ordered CT oif hands and wrists. She will see her. I spoke with Dr. Mahoney. He mentioned they are simply taking care of her headaches and has an occipital nerve block scheduled. She had received botox. For her other complaints of pain, he thinks it might be overuse of analgesics. At this point, he recommended tapering him off IV narcotics and simply follow up to his clinic.    Pending coagulation workup at this point. Will defer to outpatient heme/onc follow up        Intractable pain   Assessment & Plan    Previous physician  discussed case with Dr. Mahoney. He mentioned they are simply taking care of her headaches and has an occipital nerve block scheduled. She had received botox  Wean off narcotics, however patient went hysterical when we talked about de-escalation.         History of skin ulcer   Assessment & Plan    Being followed in the outpatient by Dr. 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  No actual vasculitis. Per Dr. Andrade's notes. Her anticardiolipin and beta 2 glycoprotein were negative in 9/15/2017  Healing overall, continue wound care, no acute issues.        Migraine   Assessment & Plan    Chronic headaches, not any worse than her usual  Per Dr. Mahoney. Recommended transitioning off of narcotics  Continue with Toradol and Tylenol        S/P  shunt- (present on admission)   Assessment & Plan    Follows Dr. Berry  Last head CT was recent, on Oct 10, showing no hydrocephalus.  Repeat head CT if headaches become severe.          The total time spent face to face with this patient was about 38 mins of which 60% of time was spent on counseling, review of records including pertinent lab data and studies, as well as discussing diagnostic evaluation and work up, planned therapeutic interventions and future disposition of care. Where indicated, the assessment and plan reflect discussion of patient with consultants, other healthcare providers, family members, and additional research needed to obtain further information in formulating the plan of care of this patient.         Reviewed items::  Radiology images reviewed and Medications reviewed  Lopez catheter::  No Lopez  DVT prophylaxis - mechanical:  SCDs

## 2017-10-18 NOTE — PROGRESS NOTES
Neisha Logan Fall Risk Assessment:     Last Known Fall: Within the last six months  Mobility: No limitations  Medications: Cardiovascular and central nervous system meds  Mental Status/LOC/Awareness: Awake, alert, and oriented to date, place, and person  Toileting Needs: No needs  Volume/Electrolyte Status: Use of IV fluids/tube feeds  Communication/Sensory: Visual (Glasses)/hearing deficit  Behavior: Appropriate behavior  Neisha Logan Fall Risk Total: 9  Fall Risk Level: LOW RISK    Universal Fall Precautions:  call light/belongings in reach, bed in low position and locked, siderails up x 2, use non-slip footwear, adequate lighting, clutter free and spill free environment, educate on level of risk, educate to call for assistance    Fall Risk Level Interventions:   TRIAL (TELE 8, NEURO, MED MAGDALENA 5) Low Fall Risk Interventions  Place yellow fall risk ID band on patient: verified  Provide patient/family education based on risk assessment: verified  Educate patient/family to call staff for assistance when getting out of bed: verified  Place fall precaution signage outside patient door: verified      Patient Specific Interventions:     Medication: review medications with patient and family and assess for medications that can be discontinued or dosage decreased  Mental Status/LOC/Awareness: not applicable  Toileting: instruct patient/family on the need to call for assistance when toileting and do not leave patient unattended in bathroom/refer to toileting scripting  Volume/Electrolyte Status: monitor abnormal lab values and ensure IV fluids are appropriate  Communication/Sensory: update plan of care on whiteboard  Behavioral: not applicable  Mobility: ensure bed is locked and in lowest position

## 2017-10-19 VITALS
DIASTOLIC BLOOD PRESSURE: 72 MMHG | SYSTOLIC BLOOD PRESSURE: 132 MMHG | TEMPERATURE: 97.9 F | HEIGHT: 63 IN | RESPIRATION RATE: 16 BRPM | WEIGHT: 160.72 LBS | HEART RATE: 77 BPM | OXYGEN SATURATION: 98 % | BODY MASS INDEX: 28.48 KG/M2

## 2017-10-19 PROBLEM — R52 INTRACTABLE PAIN: Status: RESOLVED | Noted: 2017-10-16 | Resolved: 2017-10-19

## 2017-10-19 LAB
ALBUMIN SERPL BCP-MCNC: 3.4 G/DL (ref 3.2–4.9)
ALBUMIN/GLOB SERPL: 1.5 G/DL
ALP SERPL-CCNC: 68 U/L (ref 30–99)
ALT SERPL-CCNC: 16 U/L (ref 2–50)
ANION GAP SERPL CALC-SCNC: 6 MMOL/L (ref 0–11.9)
AST SERPL-CCNC: 12 U/L (ref 12–45)
BILIRUB SERPL-MCNC: 0.3 MG/DL (ref 0.1–1.5)
BUN SERPL-MCNC: 11 MG/DL (ref 8–22)
CALCIUM SERPL-MCNC: 8.5 MG/DL (ref 8.5–10.5)
CHLORIDE SERPL-SCNC: 111 MMOL/L (ref 96–112)
CO2 SERPL-SCNC: 26 MMOL/L (ref 20–33)
CREAT SERPL-MCNC: 0.58 MG/DL (ref 0.5–1.4)
ERYTHROCYTE [DISTWIDTH] IN BLOOD BY AUTOMATED COUNT: 44.5 FL (ref 35.9–50)
GFR SERPL CREATININE-BSD FRML MDRD: >60 ML/MIN/1.73 M 2
GLOBULIN SER CALC-MCNC: 2.2 G/DL (ref 1.9–3.5)
GLUCOSE SERPL-MCNC: 94 MG/DL (ref 65–99)
HCT VFR BLD AUTO: 33.3 % (ref 37–47)
HGB BLD-MCNC: 11.2 G/DL (ref 12–16)
MAGNESIUM SERPL-MCNC: 1.9 MG/DL (ref 1.5–2.5)
MCH RBC QN AUTO: 32 PG (ref 27–33)
MCHC RBC AUTO-ENTMCNC: 33.6 G/DL (ref 33.6–35)
MCV RBC AUTO: 95.1 FL (ref 81.4–97.8)
PLATELET # BLD AUTO: 299 K/UL (ref 164–446)
PMV BLD AUTO: 10.2 FL (ref 9–12.9)
POTASSIUM SERPL-SCNC: 3.8 MMOL/L (ref 3.6–5.5)
PROT SERPL-MCNC: 5.6 G/DL (ref 6–8.2)
RBC # BLD AUTO: 3.5 M/UL (ref 4.2–5.4)
SODIUM SERPL-SCNC: 143 MMOL/L (ref 135–145)
WBC # BLD AUTO: 9.4 K/UL (ref 4.8–10.8)

## 2017-10-19 PROCEDURE — 700102 HCHG RX REV CODE 250 W/ 637 OVERRIDE(OP): Performed by: INTERNAL MEDICINE

## 2017-10-19 PROCEDURE — 700102 HCHG RX REV CODE 250 W/ 637 OVERRIDE(OP): Performed by: HOSPITALIST

## 2017-10-19 PROCEDURE — 85301 ANTITHROMBIN III ANTIGEN: CPT

## 2017-10-19 PROCEDURE — 83735 ASSAY OF MAGNESIUM: CPT

## 2017-10-19 PROCEDURE — 700111 HCHG RX REV CODE 636 W/ 250 OVERRIDE (IP): Performed by: HOSPITALIST

## 2017-10-19 PROCEDURE — A9270 NON-COVERED ITEM OR SERVICE: HCPCS | Performed by: INTERNAL MEDICINE

## 2017-10-19 PROCEDURE — 85303 CLOT INHIBIT PROT C ACTIVITY: CPT

## 2017-10-19 PROCEDURE — 80053 COMPREHEN METABOLIC PANEL: CPT

## 2017-10-19 PROCEDURE — 700105 HCHG RX REV CODE 258: Performed by: HOSPITALIST

## 2017-10-19 PROCEDURE — A9270 NON-COVERED ITEM OR SERVICE: HCPCS | Performed by: HOSPITALIST

## 2017-10-19 PROCEDURE — 85300 ANTITHROMBIN III ACTIVITY: CPT

## 2017-10-19 PROCEDURE — 99239 HOSP IP/OBS DSCHRG MGMT >30: CPT | Performed by: HOSPITALIST

## 2017-10-19 PROCEDURE — 85306 CLOT INHIBIT PROT S FREE: CPT

## 2017-10-19 PROCEDURE — 85027 COMPLETE CBC AUTOMATED: CPT

## 2017-10-19 RX ORDER — PREDNISONE 10 MG/1
10 TABLET ORAL DAILY
Qty: 53 TAB | Refills: 0 | Status: ON HOLD | OUTPATIENT
Start: 2017-10-19 | End: 2017-11-18

## 2017-10-19 RX ORDER — OXYCODONE HYDROCHLORIDE 10 MG/1
10 TABLET ORAL EVERY 6 HOURS PRN
Qty: 18 TAB | Refills: 0 | Status: SHIPPED | OUTPATIENT
Start: 2017-10-19 | End: 2017-10-23

## 2017-10-19 RX ADMIN — OMEPRAZOLE 20 MG: 20 CAPSULE, DELAYED RELEASE ORAL at 09:42

## 2017-10-19 RX ADMIN — OXYCODONE HYDROCHLORIDE 10 MG: 10 TABLET ORAL at 06:02

## 2017-10-19 RX ADMIN — SODIUM CHLORIDE: 9 INJECTION, SOLUTION INTRAVENOUS at 13:08

## 2017-10-19 RX ADMIN — KETOROLAC TROMETHAMINE 60 MG: 30 INJECTION, SOLUTION INTRAMUSCULAR at 09:42

## 2017-10-19 RX ADMIN — DIPHENHYDRAMINE HYDROCHLORIDE 50 MG: 50 INJECTION, SOLUTION INTRAMUSCULAR; INTRAVENOUS at 13:04

## 2017-10-19 RX ADMIN — HYDROMORPHONE HYDROCHLORIDE 0.5 MG: 1 INJECTION, SOLUTION INTRAMUSCULAR; INTRAVENOUS; SUBCUTANEOUS at 07:07

## 2017-10-19 RX ADMIN — ASPIRIN 81 MG: 81 TABLET, CHEWABLE ORAL at 09:42

## 2017-10-19 RX ADMIN — SODIUM CHLORIDE: 9 INJECTION, SOLUTION INTRAVENOUS at 06:03

## 2017-10-19 RX ADMIN — MORPHINE SULFATE 15 MG: 15 TABLET, EXTENDED RELEASE ORAL at 09:42

## 2017-10-19 RX ADMIN — STANDARDIZED SENNA CONCENTRATE AND DOCUSATE SODIUM 2 TABLET: 8.6; 5 TABLET, FILM COATED ORAL at 09:42

## 2017-10-19 RX ADMIN — GABAPENTIN 600 MG: 300 CAPSULE ORAL at 06:02

## 2017-10-19 RX ADMIN — DIPHENHYDRAMINE HYDROCHLORIDE 50 MG: 50 INJECTION, SOLUTION INTRAMUSCULAR; INTRAVENOUS at 06:39

## 2017-10-19 RX ADMIN — HYDROMORPHONE HYDROCHLORIDE 0.5 MG: 1 INJECTION, SOLUTION INTRAMUSCULAR; INTRAVENOUS; SUBCUTANEOUS at 14:15

## 2017-10-19 RX ADMIN — PREDNISONE 40 MG: 20 TABLET ORAL at 09:42

## 2017-10-19 RX ADMIN — DULOXETINE HYDROCHLORIDE 60 MG: 60 CAPSULE, DELAYED RELEASE ORAL at 09:42

## 2017-10-19 RX ADMIN — HYDROMORPHONE HYDROCHLORIDE 0.5 MG: 1 INJECTION, SOLUTION INTRAMUSCULAR; INTRAVENOUS; SUBCUTANEOUS at 04:04

## 2017-10-19 RX ADMIN — OXYCODONE HYDROCHLORIDE 10 MG: 10 TABLET ORAL at 11:37

## 2017-10-19 RX ADMIN — RISPERIDONE 1 MG: 1 TABLET, FILM COATED ORAL at 09:42

## 2017-10-19 ASSESSMENT — PAIN SCALES - GENERAL
PAINLEVEL_OUTOF10: 7
PAINLEVEL_OUTOF10: 6
PAINLEVEL_OUTOF10: 7
PAINLEVEL_OUTOF10: 7
PAINLEVEL_OUTOF10: 6
PAINLEVEL_OUTOF10: 7

## 2017-10-19 ASSESSMENT — ENCOUNTER SYMPTOMS: HEADACHES: 1

## 2017-10-19 NOTE — PROGRESS NOTES
Assumed care of patient @ 0700, report received at bedside,  assessment done, labs and orders noted.  Pt A & Ox4, PIV running maintenance fluids as per MAR.  Pt is resting comfortably in bed, no signs or symptoms of distress. Family at bedside.  Pt reports pain is a 7/10, needed decreased dose of dilaudid overnight.   This morning received dilaudid also at 0707 by NOC RN.  Pt has been updated on the plan of care;  (e.g., tapering off IV pain medication    Bed is in lowest position, fall risk socks in place, call light within reach. Pt verbalized all needs are met at this time.

## 2017-10-19 NOTE — DISCHARGE INSTRUCTIONS
Discharge Instructions    Discharged to home by car with self. Discharged via wheelchair, hospital escort: Refused.  Special equipment needed: Not Applicable    Be sure to schedule a follow-up appointment with your primary care doctor or any specialists as instructed.     Discharge Plan:   Influenza Vaccine Indication: Patient Refuses    I understand that a diet low in cholesterol, fat, and sodium is recommended for good health. Unless I have been given specific instructions below for another diet, I accept this instruction as my diet prescription.   Other diet: Regular diet (all the diet cokes you want :) )    Special Instructions: None    · Is patient discharged on Warfarin / Coumadin?   No     · Is patient Post Blood Transfusion?  No    Depression / Suicide Risk    As you are discharged from this [facility] facility, it is important to learn how to keep safe from harming yourself.    Recognize the warning signs:  · Abrupt changes in personality, positive or negative- including increase in energy   · Giving away possessions  · Change in eating patterns- significant weight changes-  positive or negative  · Change in sleeping patterns- unable to sleep or sleeping all the time   · Unwillingness or inability to communicate  · Depression  · Unusual sadness, discouragement and loneliness  · Talk of wanting to die  · Neglect of personal appearance   · Rebelliousness- reckless behavior  · Withdrawal from people/activities they love  · Confusion- inability to concentrate     If you or a loved one observes any of these behaviors or has concerns about self-harm, here's what you can do:  · Talk about it- your feelings and reasons for harming yourself  · Remove any means that you might use to hurt yourself (examples: pills, rope, extension cords, firearm)  · Get professional help from the community (Mental Health, Substance Abuse, psychological counseling)  · Do not be alone:Call your Safe Contact- someone whom you trust  who will be there for you.  · Call your local CRISIS HOTLINE 512-0950 or 567-425-3637  · Call your local Children's Mobile Crisis Response Team [place] (696) 131-6372 or www.JUNTA.CL  · Call the toll free National Suicide Prevention Hotlines   · National Suicide Prevention Lifeline 345-653-PXTA (9113)  · National [employer] Line Network 800-SUICIDE (682-6692)

## 2017-10-19 NOTE — PROGRESS NOTES
Pt was given discharge instructions and narcotic prescriptions.  Pt PICC d/c'd tip intact.  Pt verbalized understanding of instructions and follow up.  Pt left walking to private vehicle with .

## 2017-10-19 NOTE — PROGRESS NOTES
Neisha Logan Fall Risk Assessment:     Last Known Fall: Within the last six months  Mobility: No limitations  Medications: Cardiovascular and central nervous system meds  Mental Status/LOC/Awareness: Awake, alert, and oriented to date, place, and person  Toileting Needs: No needs  Volume/Electrolyte Status: Use of IV fluids/tube feeds  Communication/Sensory: Visual (Glasses)/hearing deficit  Behavior: Appropriate behavior  Neisha Logan Fall Risk Total: 9  Fall Risk Level: LOW RISK    Universal Fall Precautions:  call light/belongings in reach, bed in low position and locked, wheelchairs and assistive devices out of sight, siderails up x 2, use non-slip footwear, adequate lighting, clutter free and spill free environment, educate on level of risk, educate to call for assistance    Fall Risk Level Interventions:   TRIAL (TELE 8, NEURO, MED MAGDALENA 5) Low Fall Risk Interventions  Place yellow fall risk ID band on patient: verified  Provide patient/family education based on risk assessment: completed  Educate patient/family to call staff for assistance when getting out of bed: completed  Place fall precaution signage outside patient door: verified      Patient Specific Interventions:     Medication: limit combination of prn medications  Mental Status/LOC/Awareness: check on patient hourly  Toileting: not applicable  Volume/Electrolyte Status: ensure IV fluids are appropriate  Communication/Sensory: update plan of care on whiteboard  Behavioral: administer medication as ordered  Mobility: ensure bed is locked and in lowest position

## 2017-10-19 NOTE — DISCHARGE SUMMARY
CHIEF COMPLAINT ON ADMISSION  Chief Complaint   Patient presents with   • Migraine   • Hand Swelling   • Body Aches       CODE STATUS  Full Code    HPI & HOSPITAL COURSE  This is a 47 y.o. female here with Migraine headaches and generalized body aches. Patient has extensive history of seronegative rheumatoid arthritis and follows with Dr. Andrade. In addition patient also complains of having recurrent hand swelling. She was previously on prednisone but has been off it for several months. She also tried taking methotrexate but side effects including transaminitis occurred for which she stopped taking the medication. Thus far her negative workup includes negative Anca, rheumatoid factor, negative CCP negative ANCA negative HLA-B27. Hence patient was given nonsteroidals for her migraine headaches which did resolve her headaches. In addition a coagulation panel including antithrombin III, protein S and C were ordered currently pending. We did notice bilateral hand skin ulcerations which do not appear to be cellulitic, or bacterial based on phenotype. Appears to be more auto inflammatory. At this point rheumatology was consulted. He also recommended to initiate steroids and anticipate a long prednisone taper for which we will do, rheumatology also recommended that they will perform an occipital nerve block after outpatient clinic if she does have sustained continuous headaches. At this present time patient overall has a better sense of well-being. She still requesting narcotics and appears to have dependence. Understandably patient does have pain however when she is requesting additional pain medication she is crying and is very emotional. I did discuss case with her pain management physician will follow with her as an outpatient but I will go ahead and provide her with a 4 day limited supply of her home dose of oxycodone. At this time patient is clinically stable for discharge and has outpatient appointments for follow-up.  Of note we did obtain a wrist CT, which showed edema, I discussed this case with radiology who says they cannot tell difference between edema versus cellulitis. However the patient does not have an elevated white count or even clinical symptoms suggesting patient has an activated infectious process. I did explain to her that if she has any symptoms of fevers and chills to return to the urgent care or ER for further management. Do not think she warrants any antibiotics as she has been clinically stable over the past several days. In her hand swelling is improving on steroids alone.    Therefore, she is discharged in good and stable condition with close outpatient follow-up.    SPECIFIC OUTPATIENT FOLLOW-UP  Rheumatology Dr. Andrade in 1 week  Pain specialist in 1 week    DISCHARGE PROBLEM LIST  Principal Problem:    Seronegative rheumatoid arthritis (CMS-HCC) POA: Unknown  Active Problems:    S/P  shunt (Chronic) POA: Yes    Migraine POA: Unknown    History of skin ulcer POA: Unknown  Resolved Problems:    Intractable pain POA: Unknown      FOLLOW UP  Future Appointments  Date Time Provider Department Center   11/1/2017 2:00 PM Aimee Andrade M.D. [facility] None   11/2/2017 9:45 AM Marissa Garner M.D. [address]   11/6/2017 8:20 AM Art Head M.D. [facility] None   11/8/2017 1:00 PM Selina Reece P.A.-C. [facility] None     Elliott Power M.D.  [address]  [facility] [address]  [phone]    Schedule an appointment as soon as possible for a visit in 1 week  For a hospital follow up visit       MEDICATIONS ON DISCHARGE   Enedelia Pang   Home Medication Instructions JARED:[id number]    Printed on:10/19/17 1424   Medication Information                      aspirin (ASA) 81 MG Chew Tab chewable tablet  Take 81 mg by mouth 2 Times a Day.             diphenhydrAMINE (BENADRYL) 50 MG/ML Solution  1 mL by Intramuscular route as needed.             duloxetine (CYMBALTA) 60 MG CPEP delayed-release capsule  Take 60 mg by mouth 2  times a day.             gabapentin (NEURONTIN) 600 MG tablet  Take 600 mg by mouth 3 times a day.             ketorolac (TORADOL) 60 MG/2ML Solution  30 mg by Intramuscular route every 6 hours as needed (for migraine with benadryl). Not more than 2 days per week.             multivitamin (THERAGRAN) Tab  Take 1 Tab by mouth every day.             omeprazole (PRILOSEC) 20 MG delayed-release capsule  Take 1 Cap by mouth every day.             ondansetron (ZOFRAN ODT) 4 MG TABLET DISPERSIBLE  Take 1 Tab by mouth every 8 hours as needed for Nausea/Vomiting.             oxycodone immediate release (ROXICODONE) 10 MG immediate release tablet  Take 1 Tab by mouth every four hours as needed for Moderate Pain.             oxycodone immediate release (ROXICODONE) 10 MG immediate release tablet  Take 1 Tab by mouth every 6 hours as needed (Severe Pain (NRS Pain Scale 7-10; CPOT Pain Scale 6-8)) for up to 4 days.             predniSONE (DELTASONE) 10 MG Tab  Take 1 Tab by mouth every day. Take 40mg x 5 days, 30mg x 5 days, 20mg x 5 days, 10mg x 5 days and 5 mg x 5 days.,             risperidone (RISPERDAL) 0.5 MG Tab  Take 1 mg by mouth 2 times a day.                 DIET  Orders Placed This Encounter   Procedures   • Diet Order     Standing Status:   Standing     Number of Occurrences:   1     Order Specific Question:   Diet:     Answer:   Regular [1]       ACTIVITY  As tolerated.  Weight bearing as tolerated      CONSULTATIONS  Rheumatology    PROCEDURES  None    LABORATORY  Lab Results   Component Value Date/Time    SODIUM 143 10/19/2017 04:08 AM    POTASSIUM 3.8 10/19/2017 04:08 AM    CHLORIDE 111 10/19/2017 04:08 AM    CO2 26 10/19/2017 04:08 AM    GLUCOSE 94 10/19/2017 04:08 AM    BUN 11 10/19/2017 04:08 AM    CREATININE 0.58 10/19/2017 04:08 AM        Lab Results   Component Value Date/Time    WBC 9.4 10/19/2017 04:08 AM    HEMOGLOBIN 11.2 (L) 10/19/2017 04:08 AM    HEMATOCRIT 33.3 (L) 10/19/2017 04:08 AM    PLATELETCT  299 10/19/2017 04:08 AM        Total time of the discharge process exceeds 40 minutes

## 2017-10-19 NOTE — CARE PLAN
Problem: Communication  Goal: The ability to communicate needs accurately and effectively will improve  Outcome: PROGRESSING AS EXPECTED  Plan of care discussed with patient. Patient encouraged to ask questions related to care.     Problem: Pain Management  Goal: Pain level will decrease to patient's comfort goal  Outcome: PROGRESSING AS EXPECTED  Pain medication sadministered PRN for pain. Patient educated on tapering down from IV pain meds and controlling pain with available oral medications. Patient verbalizes understanding.

## 2017-10-19 NOTE — PROGRESS NOTES
Assumed care of patient following bedside shift report. Patient is A&Ox4. She is on room air. Spouse at bedside. Patient was educated on changes to pain med dosing and importance of tapering off of her IV pain meds. Patient verbalizes understanding. Day shift nurse had also provided education to patient and conferred with doctor to adjust her pain medication (from 1mg dilaudid IV q3h to 0.5mg dilaudid IV q3h) and patient is aware of the changes. Patient reports no other needs or concerns at this time. Call device is within reach. Will continue to monitor.

## 2017-10-20 ENCOUNTER — PATIENT OUTREACH (OUTPATIENT)
Dept: HEALTH INFORMATION MANAGEMENT | Facility: OTHER | Age: 47
End: 2017-10-20

## 2017-10-20 LAB
AT III ACT/NOR PPP CHRO: 107 % (ref 76–128)
AT III AG ACT/NOR PPP IA: 80 % (ref 82–136)
EKG IMPRESSION: NORMAL
PROT C ACT/NOR PPP: 157 % (ref 83–168)
PROT S ACT/NOR PPP: 79 % (ref 57–131)

## 2017-10-28 ASSESSMENT — ENCOUNTER SYMPTOMS
CHILLS: 0
COUGH: 0
FEVER: 0
HEMOPTYSIS: 0
ABDOMINAL PAIN: 0

## 2017-10-28 NOTE — PROGRESS NOTES
Subjective:   Date of Consultation:11/1/2017  2:00 PM  Primary care physician:Elliott Power M.D.    Reason for Consultation:  Ms. Pang  is a pleasant 46 y.o. year old female who presented with follow-up seronegative Rheumatoid arthritis    Seronegative Rheumatoid Arthritis, non erosive  She is off prednisone and notes intermittent pain and swelling.  She notes swelling at the [facility] on the right hand.    Hand swelling  She presented with bilateral hand swelling in the emergency room two weeks ago.  We had started prednisone which her right hand did respond.  The left hand has only increase in swelling.  CT of the right hand was done but without contrast.  What was seen was soft tissue swelling sugegstive of cellulitis.    Fever ()  Afternoon, early evening in the morning  Can last a few hours  Yes for night sweats sometimes drenching.    Bruise and skin lesions  Recurrent lesions start at bumps     Migraines  Responded well to botox injections  She had an EMG in January which she reports was normal.  She denies any further episodes of epistaxis.  She has not followed up with ENT.      Medications Compliance / Problems: compliant; she does note hair loss. She is not sure if it is related to methotrexate but is noting more hair loss.    Current Medications:  Sulfasalazine 1000 mg BID (off)  Methotrexate 5 tabs weekly q Thursday (off)  Folic acid  Prednisone 30 mg po q day (she has been on steroids for about 10 days only)    RHEUM HISTORY:  Ms. Enedelia Pang presented in January 2016 with joint pain and swelling of hands,knees, and weakness and a 3 month history of septal perforation.  ENT initial evaluation noted no disease activity.  She also has a history of abnormal LFT with unclear etiology.  She has had a work-up with GI in early 2017 which was unrevealing.  Also she has had a recurrent rash described as papules that ulcerate identified as MRSA infections.   Her labs did show thrombocytosis which  could be inflammatory and anemia which was determined to be iron deficiency anemia.  Her rheumatologic work-up showed RF(-), MG (-), ANCA (-), SCL 70 (-) and anti centromere (-)  Parvovirus and rubella IgM was negative    Hand xrays did show periarticular osteopenia.    1/9/2017  Hepatitis B surface antigen negative  Hepatitis B core antibody negative  Hepatitis C antibody negative  quantiferon negative   HIV negative     CXR 12/6/2017 was negative for edema     11/2016 Nasal biopsy did not show active disease    Epistaxis with nasal perforation  Stable.  occasional nosebleeds but it is minor.  Since her recent hospitalization she has not had an episode of epistaxis.    Elevated liver enzymes  Off methotrexate due to elevated LFT.    Stable  She has had a liver biopsy 7/2017.    Chronic diarrhea  Had a capsule endoscopy - results are pending.    Pseudotumor cerebri  She is being followed by Dr. Berry and Erika Reece and had an injection on 8/16/2017    Iron deficiency anemia  MCV is elevated which could be secondary to her methotrexate  Her anemia has also resolved.    Renal cysts  Bilateral  Plan to see nephrology    History of retinal disease  She did see ophthalmology     Past Medical History:  Pseudotumor cerebri, MRSA infection history, CVA, miraines, pituatary tumor, migraine headaches, septal perforation. No history of blood clots    Past Medical History:   Diagnosis Date   • Allergy, unspecified not elsewhere classified    • Anemia 10/05/2017    Unsure if a current issue.   • Anxiety    • autoimmune    • Depression    • H/O Clostridium difficile infection    • Hx MRSA infection    • Hydrocephalus     with lumbar shunt   • Migraine with aura, with intractable migraine, so stated, without mention of status migrainosus    • MRSA (methicillin resistant Staphylococcus aureus)    • Pituitary abnormality (CMS-HCC)    • Staph infection    • Stroke (CMS-HCC)     2007     Past Surgical History:   Procedure  "Laterality Date   •  SHUNT INSERTION  5/31/2017    Procedure:  SHUNT INSERTION;  Surgeon: Drew Berry M.D.;  Location: SURGERY [facility];  Service:    • SPINAL CORD STIMULATOR  12/19/2016    Procedure: SPINAL CORD STIMULATOR FOR: PLACEMENT OF LEFT FLANK OCCIPITAL NERVE STIMULATOR BATTERY PACK;  Surgeon: Oli Oh III, M.D.;  Location: SURGERY [facility];  Service:    • LUMBAR EXPLORATION N/A 8/31/2015    Procedure: LUMBAR EXPLORATION- Lumbar shunt removal ;  Surgeon: Oli Oh III, M.D.;  Location: SURGERY [facility];  Service:    • IRRIGATION & DEBRIDEMENT NEURO N/A 6/28/2015    Procedure: IRRIGATION & DEBRIDEMENT NEURO;  Surgeon: Oli Oh III, M.D.;  Location: SURGERY [facility];  Service:    • APPENDECTOMY     • CHOLECYSTECTOMY     • OTHER      right knee surgery   • OTHER NEUROLOGICAL SURG      occipital nerve stimulator   • TONSILLECTOMY     • TUBAL LIGATION       Allergies   Allergen Reactions   • Prochlorperazine Swelling     Tongue swelling. Reaction as a teen. (compazine)  Tolerated Phenergan on 02/24/15   • Sumatriptan Unspecified     Historical=\"Heart stops.\" Reaction  between 1995 to 1997   • Vancomycin Shortness of Breath and Rash     Reaction in 2005.  D/W patient 8/31/15 - tolerated loading dose of vancomycin administered on 8/30/15 with some itching to chest with decreased infusion rate. Red Man Syndrome     Outpatient Encounter Prescriptions as of 11/1/2017   Medication Sig Dispense Refill   • clindamycin (CLEOCIN) 300 MG Cap Take 1 Cap by mouth 3 times a day. 21 Cap 0   • predniSONE (DELTASONE) 10 MG Tab Take 1 Tab by mouth every day. Take 40mg x 5 days, 30mg x 5 days, 20mg x 5 days, 10mg x 5 days and 5 mg x 5 days., 53 Tab 0   • aspirin (ASA) 81 MG Chew Tab chewable tablet Take 81 mg by mouth 2 Times a Day.     • multivitamin (THERAGRAN) Tab Take 1 Tab by mouth every day.     • risperidone (RISPERDAL) 0.5 MG Tab Take 1 mg by mouth 2 times a day.     • " diphenhydrAMINE (BENADRYL) 50 MG/ML Solution 1 mL by Intramuscular route as needed. 25 Syringe 11   • ondansetron (ZOFRAN ODT) 4 MG TABLET DISPERSIBLE Take 1 Tab by mouth every 8 hours as needed for Nausea/Vomiting. 30 Tab 11   • omeprazole (PRILOSEC) 20 MG delayed-release capsule Take 1 Cap by mouth every day. 30 Cap 2   • gabapentin (NEURONTIN) 600 MG tablet Take 600 mg by mouth 3 times a day.     • oxycodone immediate release (ROXICODONE) 10 MG immediate release tablet Take 1 Tab by mouth every four hours as needed for Moderate Pain. 100 Tab 0   • duloxetine (CYMBALTA) 60 MG CPEP delayed-release capsule Take 60 mg by mouth 2 times a day.     • ketorolac (TORADOL) 60 MG/2ML Solution 30 mg by Intramuscular route every 6 hours as needed (for migraine with benadryl). Not more than 2 days per week.       No facility-administered encounter medications on file as of 11/1/2017.        Social History     Social History   • Marital status:      Spouse name: N/A   • Number of children: N/A   • Years of education: N/A     Occupational History   • Not on file.     Social History Main Topics   • Smoking status: Never Smoker   • Smokeless tobacco: Never Used   • Alcohol use Yes      Comment: Rare   • Drug use: No   • Sexual activity: Not on file     Other Topics Concern   • Not on file     Social History Narrative   • No narrative on file      History   Smoking Status   • Never Smoker   Smokeless Tobacco   • Never Used     History   Alcohol Use   • Yes     Comment: Rare     History   Drug Use No      OB History   No data available      Patient's last menstrual period was 10/02/2017 (approximate).    [facility]     No family history on file.    Review of Systems   Constitutional: Negative for chills and fever.        No further fevers   Respiratory: Negative for cough and hemoptysis.    Gastrointestinal: Negative for abdominal pain.   Musculoskeletal: Positive for joint pain.        Hand swelling and joint pain   Skin:  Positive for rash.                  Objective:   /90   Pulse (!) 112   Temp 36.6 °C (97.9 °F)   Wt 75.8 kg (167 lb)   LMP 10/02/2017 (Approximate)   SpO2 96%   BMI 29.58 kg/m²     Physical Exam   Constitutional: She is oriented to person, place, and time. She appears well-developed and well-nourished. No distress.   HENT:   Head: Normocephalic and atraumatic.   Right Ear: External ear normal.   Left Ear: External ear normal.   Eyes: Conjunctivae are normal. Pupils are equal, round, and reactive to light. Right eye exhibits no discharge. Left eye exhibits no discharge. No scleral icterus.   Pulmonary/Chest: Effort normal. No stridor. No respiratory distress.   Musculoskeletal: She exhibits no edema.   Left hand grossly swollen with tenderness.  Right hand no periartucular swelling but tenderness to palpation.  Good ROM of her wrists bilateral flexion and extension.   No periarticular swelling of the MCP, PIP. Good ROM flexion and extension of elbows bilateral. .      Lymphadenopathy:     She has no cervical adenopathy.   Neurological: She is alert and oriented to person, place, and time.   Skin: Skin is warm and dry. She is not diaphoretic.   No alopecia.  On the dorsum of the hand just distal to the wrist ulceration about 1 cm in diameter with no drainage and mild surrounding erythema.   On the calf there is a skin ulcerations with surrounding erythema.   Psychiatric: She has a normal mood and affect. Her behavior is normal. Judgment and thought content normal.       Assessment:     1. Cellulitis of hand     2. Seronegative rheumatoid arthritis (CMS-HCC)     3. Vasculopathy      possible.  noted on skin biopsy     Labs:    Lab Results   Component Value Date/Time    QNTTBGOLD Negative 01/09/2017 06:01 PM     Lab Results   Component Value Date/Time    HEPBCORIGM Negative 05/21/2017 02:07 AM    HEPBSAG Negative 05/21/2017 02:07 AM     Lab Results   Component Value Date/Time    HEPCAB Negative 05/21/2017  02:07 AM     Lab Results   Component Value Date/Time    SODIUM 143 10/19/2017 04:08 AM    POTASSIUM 3.8 10/19/2017 04:08 AM    CHLORIDE 111 10/19/2017 04:08 AM    CO2 26 10/19/2017 04:08 AM    GLUCOSE 94 10/19/2017 04:08 AM    BUN 11 10/19/2017 04:08 AM    CREATININE 0.58 10/19/2017 04:08 AM      Lab Results   Component Value Date/Time    WBC 9.4 10/19/2017 04:08 AM    RBC 3.50 (L) 10/19/2017 04:08 AM    HEMOGLOBIN 11.2 (L) 10/19/2017 04:08 AM    HEMATOCRIT 33.3 (L) 10/19/2017 04:08 AM    MCV 95.1 10/19/2017 04:08 AM    MCH 32.0 10/19/2017 04:08 AM    MCHC 33.6 10/19/2017 04:08 AM    MPV 10.2 10/19/2017 04:08 AM    NEUTSPOLYS 77.00 (H) 10/14/2017 07:40 PM    LYMPHOCYTES 17.60 (L) 10/14/2017 07:40 PM    MONOCYTES 4.30 10/14/2017 07:40 PM    EOSINOPHILS 0.00 10/14/2017 07:40 PM    BASOPHILS 0.70 10/14/2017 07:40 PM    HYPOCHROMIA 1+ 01/11/2017 02:29 PM    ANISOCYTOSIS 1+ 01/11/2017 02:29 PM      Lab Results   Component Value Date/Time    CALCIUM 8.5 10/19/2017 04:08 AM    ASTSGOT 12 10/19/2017 04:08 AM    ALTSGPT 16 10/19/2017 04:08 AM    ALKPHOSPHAT 68 10/19/2017 04:08 AM    TBILIRUBIN 0.3 10/19/2017 04:08 AM    ALBUMIN 3.4 10/19/2017 04:08 AM    TOTPROTEIN 5.6 (L) 10/19/2017 04:08 AM     Lab Results   Component Value Date/Time    URICACID 4.3 09/15/2017 03:45 PM    RHEUMFACTN <10 10/14/2017 07:40 PM    ANTINUCAB None Detected 01/06/2017 04:12 AM     Lab Results   Component Value Date/Time    SEDRATEWES 11 10/14/2017 07:40 PM    CREACTPROT 0.16 10/14/2017 07:40 PM     Lab Results   Component Value Date/Time    RUSSONOFREVIPER 40.3 09/15/2017 03:45 PM    DRVVTINTERP Not Present 09/15/2017 03:45 PM     Lab Results   Component Value Date/Time    G8PGCSTECIA 178.0 01/06/2017 04:12 AM    R0OKVPAOWUP 37.0 01/06/2017 04:12 AM    IGGCARDIOLI 5 09/15/2017 03:45 PM    IGMCARDIOLI 6 09/15/2017 03:45 PM    IGACARDIOLI 2 09/15/2017 03:45 PM    CRYOGLOBULIN NEG 72Hour 04/17/2017 03:53 PM     Lab Results   Component Value Date/Time     DTFCZOJ69 0 01/06/2017 04:12 AM    CENTROMBAB 2 01/06/2017 04:12 AM     Lab Results   Component Value Date/Time    ANCAIGG <1:20 01/06/2017 04:12 AM    R1BZFFXKYBG 178.0 01/06/2017 04:12 AM     Lab Results   Component Value Date/Time    COLORURINE DK Yellow 10/15/2017 09:40 AM    SPECGRAVITY 1.010 10/15/2017 09:40 AM    PHURINE 5.5 10/15/2017 09:40 AM    GLUCOSEUR Negative 10/15/2017 09:40 AM    KETONES Negative 10/15/2017 09:40 AM    PROTEINURIN Negative 10/15/2017 09:40 AM     No results found for: TOTPROTUR, TOTALVOLUME, YDDDHVJC16  No results found for: SSA60, SSA52  Lab Results   Component Value Date/Time    HBA1C 5.2 04/29/2017 02:09 PM     Lab Results   Component Value Date/Time    CPKTOTAL 79 10/16/2017 04:15 AM     Lab Results   Component Value Date/Time    G6PD 20.4 (H) 01/10/2017 01:54 PM     Lab Results   Component Value Date/Time    MDVU17KVUH Negative 10/14/2017 07:40 PM     No results found for: ACESERUM  Lab Results   Component Value Date/Time    25HYDROXY 45 01/09/2017 06:00 PM     No results found for: TSH, FREEDIR  Lab Results   Component Value Date/Time    TSHULTRASEN 6.860 (H) 05/21/2017 02:07 AM    FREET4 0.76 01/05/2017 11:22 PM     No results found for: MICROSOMALA, ANTITHYROGL  No results found for: IGGLYMEABS  No results found for: ANTIMITOCHO, FACTIN  No results found for: IGA, TTRANSIGA, ENDOIGA  No results found for: FLTYPE, CRYSTALSBDF  No results found for: ISTATICAL  No results found for: ISTATCREAT  No results found for: CTELOPEP  No results found for: GBMABG  No results found for: PTHINTACT  1/27/2015 hepatitis E IgM    1/27/2015 ceruloplasmin = 29.7  1/27/2015 smith antibody = 9 (normal)  1/27/2015 MG negative  1/27/2015 alpha antitrypsin 119 ()  1/27/2015 ferritin = 75 (normal)  1/27/2015 GGT = 327 (0-55)    1/27/2015 IgG 4 = 11  10/31/2016 and 1/27/2015  p ANCA (-), c ANCA (-), PR3 (-)  10/31/2016 MPO (-)  12/7/2014 AST = 23 ALT = 137 Alk phos = 205  6/10/2016 ALT = 921  AST = 821 Alk phos = 537  Labs from JUne 2016 shows low albumin 2.7 and AST 92 and ALT of 370    6/11/2016; AST=92, ALT  = 370 Alk Phos = 331 Hgb = 8.6 MCV = 74.7 t bili = 1.2  10/15/2015: Hgb = 9.1 Plt =   ALT= 357 AST =127 Alk phos = 469 creatinine = 0.7    US LE venous doppler bilateral 6/30/2014  Probable baker's cyst on left lower extremity    Left hand XR 8/14/2014  Soft tissue swelling over the dorsum.  No erorsons no fractures     CT abdomen/Pelvis w/o Contrast 9/4/2014  Small amount of fluid in cul-de-sac no abnormalities in liver.  Abdominal aorta was normal.  Adrenal glands normal.  coritical medullatry calcifications noted of left kidney    CT head w w/o contrast 10/15/2016  No acute abnormalities and on CT maxillofacial w/ contrast - no evidence of sinusitis    CTchest w/con 1/12/2016  No findings to suggest aortic dissection or pulmonary embolus  Left renal 18 mm cyst in the ventral cortex medially    CT spine lumbar w/o contrast  8/30/2015  Small fluid  Collection posterior junction of the epidural catheter seen right lateral to the L2 posterior spinous process consistent with leakage and/or infection      Blood work performed at outside labs dated May 6, 2017 white blood cell count of 7 total protein is 7.8 albumin is 4.1 monocytes 1010.7 neutrophils were normal at 5.33 and lymphocytes were normal at 9 point correction 0.94 platelets were 233 HEENT hemoglobin  Labs from June 22, 2017 showed a hemoglobin of 14.3 AST was normalized at 27 ALT was elevated at 71 and phosphatase was normal at 180 transferrin saturation was normal at 15% creatinine was normal at 0.6  Transglutaminase IgA antibody was negative on June 22, 2017  Creatinine IgA antibody was pending. On June 22, 2017 endomysial IgA antibody was negative on June 22, 2017  White blood cell was 8.6 platelets were 423 on June 22, 2017  IgG total was 813 which is normal on June 22, 2017 IgA total was 130 which is normal and June 22,  2017  Ceruloplasmin was normal at 35.2 on June 22, 2017 and alpha-1 antitrypsin in the serum was 142 which is normal on June 22, 2017    TTG antibody was negative on June 22, 2017  IgG subclass  IgG 2 subclass was normal at 311, IgG4 subclass was normal at 8, IgG1 subclass was normal at 676 and IgG 3 subclass was slightly elevated at 122 (15-1 02)  Ferritin was normal at 43 on June 22, 2017 creatinine IgG antibody was negative on June 22, 2017. On May 6, 2017 AST was 31 and ALT was normal at 54    Medical Decision Making:  Today's Assessment / Status / Plan:     Skin ulcerations  ASO and anti DNASE was elevated  Since her hospitalization her ulcerations still persist  She is being followed by wound care    Seronegative rheumatoid arthritis with involvement of the small joints of the hands and wrists and periarticular osteopenia on xray. CT of the right  hand noted soft tissue swelling and concern for cellulitis.    She has had recurrent infections and I feel that putting her on a biologic is high risk.  Alternatively we can't use methotrexate and leflunomide or other medications that may consult the liver as I feel her liver enzymes being elevated may be secondary not specifically to methotrexate but sensitive to use of hepatotoxic medications. I could consider restarting sulfasalazine however CT of the hand did not include contrast.  The CT of the right hand did not show erosions.    While being on prednisone did improve the joint pain in her right hand the left hand is swollen.    Septal perforation  ANCA serologies are negative x 3  MG Is negative  Biopsy results did not show active disease  CXR did not note hilar adenopathy  Due to her recent hospitalization and findings by dermatology she was advised to avoid her biopsy and so that has been cancelled.    While we're concerned about ANCA vasculitis I have not had any evidence.   I discussed her biopsy with Dermatology and there does not appear to be evidence of  vasculitis  At last visit we checked ASO and anti DNASE.  Labs were positive and she will see ID in follow-up tomorrow    Cellulitis  Noted on right hand CT   In the hospital both hands were swollen and it appears the right hand is improved and today left hand remains swollen.  Since she has had no response in the left hand on steroids we will stop prednisone.  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 Cellulitis of hand     2. Seronegative rheumatoid arthritis (CMS-HCC)     3. Vasculopathy      possible.  noted on skin biopsy     Return in about 6 weeks (around 12/13/2017) for @ 11:00 AM.      She agreed and verbalized her agreement and understanding with the current plan. I answered all questions and concerns she has at this time and advised her to call at any time in there interim with questions or concerns in regards to her care.    Thank you for allowing me to participate in her care, I will continue to follow closely.

## 2017-11-01 ENCOUNTER — OFFICE VISIT (OUTPATIENT)
Dept: RHEUMATOLOGY | Facility: PHYSICIAN GROUP | Age: 47
End: 2017-11-01
Payer: MEDICARE

## 2017-11-01 VITALS
WEIGHT: 167 LBS | HEART RATE: 112 BPM | DIASTOLIC BLOOD PRESSURE: 90 MMHG | SYSTOLIC BLOOD PRESSURE: 150 MMHG | OXYGEN SATURATION: 96 % | TEMPERATURE: 97.9 F | BODY MASS INDEX: 29.58 KG/M2

## 2017-11-01 DIAGNOSIS — M06.00 SERONEGATIVE RHEUMATOID ARTHRITIS (HCC): ICD-10-CM

## 2017-11-01 DIAGNOSIS — I99.9 VASCULOPATHY: ICD-10-CM

## 2017-11-01 DIAGNOSIS — L03.119 CELLULITIS OF HAND: ICD-10-CM

## 2017-11-01 PROCEDURE — 99214 OFFICE O/P EST MOD 30 MIN: CPT | Performed by: INTERNAL MEDICINE

## 2017-11-01 NOTE — LETTER
[facility]   [address], [address]  [place] [address]  Phone: [phone]  Fax: [phone]              Encounter Date: 11/1/2017    Dear Dr. Florence ref. provider found,    It was a pleasure seeing your patient, Enedelia Pang, on 11/1/2017. Diagnoses of Cellulitis of hand, Seronegative rheumatoid arthritis (CMS-HCC), and Vasculopathy were pertinent to this visit.     Please find attached progress note which includes the history I obtained from Ms. Pang, my physical examination findings, my impression and recommendations.      Once again, it was a pleasure participating in your patient's care.  Please feel free to contact me if you have any questions or if I can be of any further assistance to your patients.      Sincerely,    Aimee Andrade M.D.  Electronically Signed          PROGRESS NOTE:  Subjective:   Date of Consultation:11/1/2017  2:00 PM  Primary care physician:Elliott Power M.D.    Reason for Consultation:  Ms. Pang  is a pleasant 46 y.o. year old female who presented with follow-up seronegative Rheumatoid arthritis    Seronegative Rheumatoid Arthritis, non erosive  She is off prednisone and notes intermittent pain and swelling.  She notes swelling at the [facility] on the right hand.    Hand swelling  She presented with bilateral hand swelling in the emergency room two weeks ago.  We had started prednisone which her right hand did respond.  The left hand has only increase in swelling.  CT of the right hand was done but without contrast.  What was seen was soft tissue swelling sugegstive of cellulitis.    Fever ()  Afternoon, early evening in the morning  Can last a few hours  Yes for night sweats sometimes drenching.    Bruise and skin lesions  Recurrent lesions start at bumps     Migraines  Responded well to botox injections  She had an EMG in January which she reports was normal.  She denies any further episodes of epistaxis.  She has not followed up with  ENT.      Medications Compliance / Problems: compliant; she does note hair loss. She is not sure if it is related to methotrexate but is noting more hair loss.    Current Medications:  Sulfasalazine 1000 mg BID (off)  Methotrexate 5 tabs weekly q Thursday (off)  Folic acid  Prednisone 30 mg po q day (she has been on steroids for about 10 days only)    RHEUM HISTORY:  Ms. Enedelia Pang presented in January 2016 with joint pain and swelling of hands,knees, and weakness and a 3 month history of septal perforation.  ENT initial evaluation noted no disease activity.  She also has a history of abnormal LFT with unclear etiology.  She has had a work-up with GI in early 2017 which was unrevealing.  Also she has had a recurrent rash described as papules that ulcerate identified as MRSA infections.   Her labs did show thrombocytosis which could be inflammatory and anemia which was determined to be iron deficiency anemia.  Her rheumatologic work-up showed RF(-), MG (-), ANCA (-), SCL 70 (-) and anti centromere (-)  Parvovirus and rubella IgM was negative    Hand xrays did show periarticular osteopenia.    1/9/2017  Hepatitis B surface antigen negative  Hepatitis B core antibody negative  Hepatitis C antibody negative  quantiferon negative   HIV negative     CXR 12/6/2017 was negative for edema     11/2016 Nasal biopsy did not show active disease    Epistaxis with nasal perforation  Stable.  occasional nosebleeds but it is minor.  Since her recent hospitalization she has not had an episode of epistaxis.    Elevated liver enzymes  Off methotrexate due to elevated LFT.    Stable  She has had a liver biopsy 7/2017.    Chronic diarrhea  Had a capsule endoscopy - results are pending.    Pseudotumor cerebri  She is being followed by Dr. Berry and Erika Reece and had an injection on 8/16/2017    Iron deficiency anemia  MCV is elevated which could be secondary to her methotrexate  Her anemia has also resolved.    Renal  cysts  Bilateral  Plan to see nephrology    History of retinal disease  She did see ophthalmology     Past Medical History:  Pseudotumor cerebri, MRSA infection history, CVA, miraines, pituatary tumor, migraine headaches, septal perforation. No history of blood clots    Past Medical History:   Diagnosis Date   • Allergy, unspecified not elsewhere classified    • Anemia 10/05/2017    Unsure if a current issue.   • Anxiety    • autoimmune    • Depression    • H/O Clostridium difficile infection    • Hx MRSA infection    • Hydrocephalus     with lumbar shunt   • Migraine with aura, with intractable migraine, so stated, without mention of status migrainosus    • MRSA (methicillin resistant Staphylococcus aureus)    • Pituitary abnormality (CMS-HCC)    • Staph infection    • Stroke (CMS-HCC)     2007     Past Surgical History:   Procedure Laterality Date   •  SHUNT INSERTION  5/31/2017    Procedure:  SHUNT INSERTION;  Surgeon: Drew Berry M.D.;  Location: SURGERY [facility];  Service:    • SPINAL CORD STIMULATOR  12/19/2016    Procedure: SPINAL CORD STIMULATOR FOR: PLACEMENT OF LEFT FLANK OCCIPITAL NERVE STIMULATOR BATTERY PACK;  Surgeon: Oli Oh III, M.D.;  Location: SURGERY [facility];  Service:    • LUMBAR EXPLORATION N/A 8/31/2015    Procedure: LUMBAR EXPLORATION- Lumbar shunt removal ;  Surgeon: Oli Oh III, M.D.;  Location: SURGERY [facility];  Service:    • IRRIGATION & DEBRIDEMENT NEURO N/A 6/28/2015    Procedure: IRRIGATION & DEBRIDEMENT NEURO;  Surgeon: Oli Oh III, M.D.;  Location: SURGERY [facility];  Service:    • APPENDECTOMY     • CHOLECYSTECTOMY     • OTHER      right knee surgery   • OTHER NEUROLOGICAL SURG      occipital nerve stimulator   • TONSILLECTOMY     • TUBAL LIGATION       Allergies   Allergen Reactions   • Prochlorperazine Swelling     Tongue swelling. Reaction as a teen. (compazine)  Tolerated Phenergan on 02/24/15   • Sumatriptan Unspecified  "    Historical=\"Heart stops.\" Reaction  between 1995 to 1997   • Vancomycin Shortness of Breath and Rash     Reaction in 2005.  D/W patient 8/31/15 - tolerated loading dose of vancomycin administered on 8/30/15 with some itching to chest with decreased infusion rate. Red Man Syndrome     Outpatient Encounter Prescriptions as of 11/1/2017   Medication Sig Dispense Refill   • clindamycin (CLEOCIN) 300 MG Cap Take 1 Cap by mouth 3 times a day. 21 Cap 0   • predniSONE (DELTASONE) 10 MG Tab Take 1 Tab by mouth every day. Take 40mg x 5 days, 30mg x 5 days, 20mg x 5 days, 10mg x 5 days and 5 mg x 5 days., 53 Tab 0   • aspirin (ASA) 81 MG Chew Tab chewable tablet Take 81 mg by mouth 2 Times a Day.     • multivitamin (THERAGRAN) Tab Take 1 Tab by mouth every day.     • risperidone (RISPERDAL) 0.5 MG Tab Take 1 mg by mouth 2 times a day.     • diphenhydrAMINE (BENADRYL) 50 MG/ML Solution 1 mL by Intramuscular route as needed. 25 Syringe 11   • ondansetron (ZOFRAN ODT) 4 MG TABLET DISPERSIBLE Take 1 Tab by mouth every 8 hours as needed for Nausea/Vomiting. 30 Tab 11   • omeprazole (PRILOSEC) 20 MG delayed-release capsule Take 1 Cap by mouth every day. 30 Cap 2   • gabapentin (NEURONTIN) 600 MG tablet Take 600 mg by mouth 3 times a day.     • oxycodone immediate release (ROXICODONE) 10 MG immediate release tablet Take 1 Tab by mouth every four hours as needed for Moderate Pain. 100 Tab 0   • duloxetine (CYMBALTA) 60 MG CPEP delayed-release capsule Take 60 mg by mouth 2 times a day.     • ketorolac (TORADOL) 60 MG/2ML Solution 30 mg by Intramuscular route every 6 hours as needed (for migraine with benadryl). Not more than 2 days per week.       No facility-administered encounter medications on file as of 11/1/2017.        Social History     Social History   • Marital status:      Spouse name: N/A   • Number of children: N/A   • Years of education: N/A     Occupational History   • Not on file.     Social History Main " Topics   • Smoking status: Never Smoker   • Smokeless tobacco: Never Used   • Alcohol use Yes      Comment: Rare   • Drug use: No   • Sexual activity: Not on file     Other Topics Concern   • Not on file     Social History Narrative   • No narrative on file      History   Smoking Status   • Never Smoker   Smokeless Tobacco   • Never Used     History   Alcohol Use   • Yes     Comment: Rare     History   Drug Use No      OB History   No data available      Patient's last menstrual period was 10/02/2017 (approximate).    [facility]     No family history on file.    Review of Systems   Constitutional: Negative for chills and fever.        No further fevers   Respiratory: Negative for cough and hemoptysis.    Gastrointestinal: Negative for abdominal pain.   Musculoskeletal: Positive for joint pain.        Hand swelling and joint pain   Skin: Positive for rash.                  Objective:   /90   Pulse (!) 112   Temp 36.6 °C (97.9 °F)   Wt 75.8 kg (167 lb)   LMP 10/02/2017 (Approximate)   SpO2 96%   BMI 29.58 kg/m²      Physical Exam   Constitutional: She is oriented to person, place, and time. She appears well-developed and well-nourished. No distress.   HENT:   Head: Normocephalic and atraumatic.   Right Ear: External ear normal.   Left Ear: External ear normal.   Eyes: Conjunctivae are normal. Pupils are equal, round, and reactive to light. Right eye exhibits no discharge. Left eye exhibits no discharge. No scleral icterus.   Pulmonary/Chest: Effort normal. No stridor. No respiratory distress.   Musculoskeletal: She exhibits no edema.   Left hand grossly swollen with tenderness.  Right hand no periartucular swelling but tenderness to palpation.  Good ROM of her wrists bilateral flexion and extension.   No periarticular swelling of the MCP, PIP. Good ROM flexion and extension of elbows bilateral. .      Lymphadenopathy:     She has no cervical adenopathy.   Neurological: She is alert and oriented to person,  place, and time.   Skin: Skin is warm and dry. She is not diaphoretic.   No alopecia.  On the dorsum of the hand just distal to the wrist ulceration about 1 cm in diameter with no drainage and mild surrounding erythema.   On the calf there is a skin ulcerations with surrounding erythema.   Psychiatric: She has a normal mood and affect. Her behavior is normal. Judgment and thought content normal.       Assessment:     1. Cellulitis of hand     2. Seronegative rheumatoid arthritis (CMS-HCC)     3. Vasculopathy      possible.  noted on skin biopsy     Labs:    Lab Results   Component Value Date/Time    QNTTBGOLD Negative 01/09/2017 06:01 PM     Lab Results   Component Value Date/Time    HEPBCORIGM Negative 05/21/2017 02:07 AM    HEPBSAG Negative 05/21/2017 02:07 AM     Lab Results   Component Value Date/Time    HEPCAB Negative 05/21/2017 02:07 AM     Lab Results   Component Value Date/Time    SODIUM 143 10/19/2017 04:08 AM    POTASSIUM 3.8 10/19/2017 04:08 AM    CHLORIDE 111 10/19/2017 04:08 AM    CO2 26 10/19/2017 04:08 AM    GLUCOSE 94 10/19/2017 04:08 AM    BUN 11 10/19/2017 04:08 AM    CREATININE 0.58 10/19/2017 04:08 AM      Lab Results   Component Value Date/Time    WBC 9.4 10/19/2017 04:08 AM    RBC 3.50 (L) 10/19/2017 04:08 AM    HEMOGLOBIN 11.2 (L) 10/19/2017 04:08 AM    HEMATOCRIT 33.3 (L) 10/19/2017 04:08 AM    MCV 95.1 10/19/2017 04:08 AM    MCH 32.0 10/19/2017 04:08 AM    MCHC 33.6 10/19/2017 04:08 AM    MPV 10.2 10/19/2017 04:08 AM    NEUTSPOLYS 77.00 (H) 10/14/2017 07:40 PM    LYMPHOCYTES 17.60 (L) 10/14/2017 07:40 PM    MONOCYTES 4.30 10/14/2017 07:40 PM    EOSINOPHILS 0.00 10/14/2017 07:40 PM    BASOPHILS 0.70 10/14/2017 07:40 PM    HYPOCHROMIA 1+ 01/11/2017 02:29 PM    ANISOCYTOSIS 1+ 01/11/2017 02:29 PM      Lab Results   Component Value Date/Time    CALCIUM 8.5 10/19/2017 04:08 AM    ASTSGOT 12 10/19/2017 04:08 AM    ALTSGPT 16 10/19/2017 04:08 AM    ALKPHOSPHAT 68 10/19/2017 04:08 AM    TBILIRUBIN  0.3 10/19/2017 04:08 AM    ALBUMIN 3.4 10/19/2017 04:08 AM    TOTPROTEIN 5.6 (L) 10/19/2017 04:08 AM     Lab Results   Component Value Date/Time    URICACID 4.3 09/15/2017 03:45 PM    RHEUMFACTN <10 10/14/2017 07:40 PM    ANTINUCAB None Detected 01/06/2017 04:12 AM     Lab Results   Component Value Date/Time    SEDRATEWES 11 10/14/2017 07:40 PM    CREACTPROT 0.16 10/14/2017 07:40 PM     Lab Results   Component Value Date/Time    RUSSELVIPER 40.3 09/15/2017 03:45 PM    DRVVTINTERP Not Present 09/15/2017 03:45 PM     Lab Results   Component Value Date/Time    R6QXKOXAFYT 178.0 01/06/2017 04:12 AM    R7JCNXZKAKF 37.0 01/06/2017 04:12 AM    IGGCARDIOLI 5 09/15/2017 03:45 PM    IGMCARDIOLI 6 09/15/2017 03:45 PM    IGACARDIOLI 2 09/15/2017 03:45 PM    CRYOGLOBULIN NEG 72Hour 04/17/2017 03:53 PM     Lab Results   Component Value Date/Time    IQXOUIA54 0 01/06/2017 04:12 AM    CENTROMBAB 2 01/06/2017 04:12 AM     Lab Results   Component Value Date/Time    ANCAIGG <1:20 01/06/2017 04:12 AM    Y2XPDQOIFQP 178.0 01/06/2017 04:12 AM     Lab Results   Component Value Date/Time    COLORURINE DK Yellow 10/15/2017 09:40 AM    SPECGRAVITY 1.010 10/15/2017 09:40 AM    PHURINE 5.5 10/15/2017 09:40 AM    GLUCOSEUR Negative 10/15/2017 09:40 AM    KETONES Negative 10/15/2017 09:40 AM    PROTEINURIN Negative 10/15/2017 09:40 AM     No results found for: TOTPROTUR, TOTALVOLUME, OUGQACIU81  No results found for: SSA60, SSA52  Lab Results   Component Value Date/Time    HBA1C 5.2 04/29/2017 02:09 PM     Lab Results   Component Value Date/Time    CPKTOTAL 79 10/16/2017 04:15 AM     Lab Results   Component Value Date/Time    G6PD 20.4 (H) 01/10/2017 01:54 PM     Lab Results   Component Value Date/Time    XCUF31UWUH Negative 10/14/2017 07:40 PM     No results found for: ACESERUM  Lab Results   Component Value Date/Time    25HYDROXY 45 01/09/2017 06:00 PM     No results found for: TSH, FREEDIR  Lab Results   Component Value Date/Time     TSHULTRASEN 6.860 (H) 05/21/2017 02:07 AM    FREET4 0.76 01/05/2017 11:22 PM     No results found for: MICROSOMALA, ANTITHYROGL  No results found for: IGGLYMEABS  No results found for: ANTIMITOCHO, FACTIN  No results found for: IGA, TTRANSIGA, ENDOIGA  No results found for: FLTYPE, CRYSTALSBDF  No results found for: ISTATICAL  No results found for: ISTATCREAT  No results found for: CTELOPEP  No results found for: GBMABG  No results found for: PTHINTACT  1/27/2015 hepatitis E IgM    1/27/2015 ceruloplasmin = 29.7  1/27/2015 smith antibody = 9 (normal)  1/27/2015 MG negative  1/27/2015 alpha antitrypsin 119 ()  1/27/2015 ferritin = 75 (normal)  1/27/2015 GGT = 327 (0-55)    1/27/2015 IgG 4 = 11  10/31/2016 and 1/27/2015  p ANCA (-), c ANCA (-), PR3 (-)  10/31/2016 MPO (-)  12/7/2014 AST = 23 ALT = 137 Alk phos = 205  6/10/2016 ALT = 921 AST = 821 Alk phos = 537  Labs from JUne 2016 shows low albumin 2.7 and AST 92 and ALT of 370    6/11/2016; AST=92, ALT  = 370 Alk Phos = 331 Hgb = 8.6 MCV = 74.7 t bili = 1.2  10/15/2015: Hgb = 9.1 Plt =   ALT= 357 AST =127 Alk phos = 469 creatinine = 0.7    US LE venous doppler bilateral 6/30/2014  Probable baker's cyst on left lower extremity    Left hand XR 8/14/2014  Soft tissue swelling over the dorsum.  No erorsons no fractures     CT abdomen/Pelvis w/o Contrast 9/4/2014  Small amount of fluid in cul-de-sac no abnormalities in liver.  Abdominal aorta was normal.  Adrenal glands normal.  coritical medullatry calcifications noted of left kidney    CT head w w/o contrast 10/15/2016  No acute abnormalities and on CT maxillofacial w/ contrast - no evidence of sinusitis    CTchest w/con 1/12/2016  No findings to suggest aortic dissection or pulmonary embolus  Left renal 18 mm cyst in the ventral cortex medially    CT spine lumbar w/o contrast  8/30/2015  Small fluid  Collection posterior junction of the epidural catheter seen right lateral to the L2 posterior spinous process  consistent with leakage and/or infection      Blood work performed at outside labs dated May 6, 2017 white blood cell count of 7 total protein is 7.8 albumin is 4.1 monocytes 1010.7 neutrophils were normal at 5.33 and lymphocytes were normal at 9 point correction 0.94 platelets were 233 HEENT hemoglobin  Labs from June 22, 2017 showed a hemoglobin of 14.3 AST was normalized at 27 ALT was elevated at 71 and phosphatase was normal at 180 transferrin saturation was normal at 15% creatinine was normal at 0.6  Transglutaminase IgA antibody was negative on June 22, 2017  Creatinine IgA antibody was pending. On June 22, 2017 endomysial IgA antibody was negative on June 22, 2017  White blood cell was 8.6 platelets were 423 on June 22, 2017  IgG total was 813 which is normal on June 22, 2017 IgA total was 130 which is normal and June 22, 2017  Ceruloplasmin was normal at 35.2 on June 22, 2017 and alpha-1 antitrypsin in the serum was 142 which is normal on June 22, 2017    TTG antibody was negative on June 22, 2017  IgG subclass  IgG 2 subclass was normal at 311, IgG4 subclass was normal at 8, IgG1 subclass was normal at 676 and IgG 3 subclass was slightly elevated at 122 (15-1 02)  Ferritin was normal at 43 on June 22, 2017 creatinine IgG antibody was negative on June 22, 2017. On May 6, 2017 AST was 31 and ALT was normal at 54    Medical Decision Making:  Today's Assessment / Status / Plan:     Skin ulcerations  ASO and anti DNASE was elevated  Since her hospitalization her ulcerations still persist  She is being followed by wound care    Seronegative rheumatoid arthritis with involvement of the small joints of the hands and wrists and periarticular osteopenia on xray. CT of the right  hand noted soft tissue swelling and concern for cellulitis.    She has had recurrent infections and I feel that putting her on a biologic is high risk.  Alternatively we can't use methotrexate and leflunomide or other medications that may  consult the liver as I feel her liver enzymes being elevated may be secondary not specifically to methotrexate but sensitive to use of hepatotoxic medications. I could consider restarting sulfasalazine however CT of the hand did not include contrast.  The CT of the right hand did not show erosions.    While being on prednisone did improve the joint pain in her right hand the left hand is swollen.    Septal perforation  ANCA serologies are negative x 3  MG Is negative  Biopsy results did not show active disease  CXR did not note hilar adenopathy  Due to her recent hospitalization and findings by dermatology she was advised to avoid her biopsy and so that has been cancelled.    While we're concerned about ANCA vasculitis I have not had any evidence.   I discussed her biopsy with Dermatology and there does not appear to be evidence of vasculitis  At last visit we checked ASO and anti DNASE.  Labs were positive and she will see ID in follow-up tomorrow    Cellulitis  Noted on right hand CT   In the hospital both hands were swollen and it appears the right hand is improved and today left hand remains swollen.  Since she has had no response in the left hand on steroids we will stop prednisone.  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 Cellulitis of hand     2. Seronegative rheumatoid arthritis (CMS-HCC)     3. Vasculopathy      possible.  noted on skin biopsy     Return in about 6 weeks (around 12/13/2017) for @ 11:00 AM.      She agreed and verbalized her agreement and understanding with the current plan. I answered all questions and concerns she has at this time and advised her to call at any time in there interim with questions or concerns in regards to her care.    Thank you for allowing me to participate in her care, I will continue to follow closely.

## 2017-11-02 ENCOUNTER — OFFICE VISIT (OUTPATIENT)
Dept: INFECTIOUS DISEASES | Facility: MEDICAL CENTER | Age: 47
End: 2017-11-02
Payer: MEDICARE

## 2017-11-02 VITALS
BODY MASS INDEX: 29.54 KG/M2 | DIASTOLIC BLOOD PRESSURE: 60 MMHG | HEART RATE: 114 BPM | OXYGEN SATURATION: 95 % | HEIGHT: 63 IN | SYSTOLIC BLOOD PRESSURE: 118 MMHG | WEIGHT: 166.7 LBS | TEMPERATURE: 98.2 F

## 2017-11-02 DIAGNOSIS — M06.00 SERONEGATIVE RHEUMATOID ARTHRITIS (HCC): Chronic | ICD-10-CM

## 2017-11-02 DIAGNOSIS — A49.02 MRSA INFECTION: ICD-10-CM

## 2017-11-02 DIAGNOSIS — A04.72 INTESTINAL INFECTION DUE TO CLOSTRIDIUM DIFFICILE: ICD-10-CM

## 2017-11-02 DIAGNOSIS — T07.XXXA WOUNDS, MULTIPLE: Chronic | ICD-10-CM

## 2017-11-02 PROCEDURE — 99214 OFFICE O/P EST MOD 30 MIN: CPT | Performed by: INTERNAL MEDICINE

## 2017-11-02 RX ORDER — CLINDAMYCIN HYDROCHLORIDE 300 MG/1
300 CAPSULE ORAL 3 TIMES DAILY
Qty: 21 CAP | Refills: 0 | Status: ON HOLD | OUTPATIENT
Start: 2017-11-02 | End: 2017-11-18

## 2017-11-02 RX ORDER — DOXYCYCLINE HYCLATE 100 MG
100 TABLET ORAL 2 TIMES DAILY
Qty: 60 TAB | Refills: 0 | Status: ON HOLD | OUTPATIENT
Start: 2017-11-02 | End: 2017-11-18

## 2017-11-02 NOTE — PROGRESS NOTES
Chief Complaint   Patient presents with   • Follow-Up     Streptococcal infection       HISTORY OF PRESENT ILLNESS: Patient is a 47 y.o. female established patient who presents today for elevated ASO titer. Known to ID from prior port infection in 2015 Port since removed. +chronobacter and MSSA  Since January 2016 + joint pain and swelling of hands,knees, and weakness and septal perforation.  ENT initial evaluation noted no disease activity.  No h/o illicit drug use.  She also has a history of abnormal LFT with unclear etiology.  Work-up with GI in early 2017 which was unrevealing.   She has had a recurrent rash described as papules/bullae that ulcerate. Sometimes are associated with surrounding halo or erythema. S/p biopsy-results are pending  Hep C neg. HIV neg  h/o MRSA infections.     Her rheumatologic work-up showed RF(-), MG (-), ANCA (-), SCL 70 (-) and anti centromere (-)  Parvovirus and rubella IgM was negative   Pictures reviewed-skin lesions bullous then ulcerate. Sometimes associated with erythema.  Sometimes antecedent ecchymosis or halo  Has  shunt    11/2  DW Rheum yesterday Dr Andrade  Prior hosp admission reviewed  ASO titer not done as patient requested to be done in the hospital and it was not done as requested  Problems with bilateral hand swelling-has punched out approx 1 cm lesions/ulcerations dorsum of both hands  No erythema or warmth  No fever or chills-swelling increased after stopping steroids    Patient Active Problem List    Diagnosis Date Noted   • Status migrainosus 05/21/2017     Priority: High   • Suicidal ideation 04/30/2017     Priority: High   • Chest pain 04/29/2017     Priority: High   • Seronegative rheumatoid arthritis (CMS-HCC) 01/17/2017     Priority: High   • Swelling of joint of hand 01/06/2017     Priority: High   • Septic shock (CMS-HCC) 08/31/2015     Priority: High   • Back pain 06/28/2015     Priority: High   • R Hemiparesis 10/29/2014     Priority: High   • Whole  body pain 05/22/2017     Priority: Medium   • Nasal congestion 05/22/2017     Priority: Medium   • Diarrhea 05/21/2017     Priority: Medium   • LFT elevation 05/21/2017     Priority: Medium   • Anxiety 06/29/2015     Priority: Medium   • Intractable migraine 03/21/2015     Priority: Medium   • Osteomyelitis of finger of left hand (CMS-HCC) 10/30/2014     Priority: Medium   • Complicated migraine 10/29/2014     Priority: Medium   • Intestinal infection due to Clostridium difficile 10/10/2014     Priority: Medium   • Chronic pain syndrome 12/15/2016     Priority: Low   • History of skin ulcer 10/15/2017   • Chronic migraine 07/13/2017   • Nausea and vomiting 05/26/2017   • Positive blood cultures 05/26/2017   • Rheumatoid arthritis(714.0) 05/22/2017   • Hyperlipidemia 05/01/2017   • Metabolic acidosis 05/01/2017   • Rheumatoid arthritis (CMS-HCC) 05/01/2017   • H/O: CVA (cerebrovascular accident) 04/30/2017   • Chronic pain disorder 12/19/2016   • Migraine 10/04/2015   • Chronic pain 10/04/2015   • Bacteremia 10/04/2015   • Infected venous access port 10/03/2015   • Normocytic anemia 08/31/2015   • Thrombocytopenia (CMS-HCC) 08/31/2015   • Hypokalemia 08/30/2015   • Lactic acidosis 08/30/2015   • Hypomagnesemia 08/30/2015   • Leukocytosis 08/30/2015   • Sepsis (CMS-HCC) 08/30/2015   • Intractable low back pain 08/30/2015   • Hypocalcemia 08/30/2015   • Headache 07/03/2015   • Abdominal pain 07/03/2015   • S/P  shunt 06/29/2015   • Depression 06/29/2015   • SIRS (systemic inflammatory response syndrome) (CMS-HCC) 06/28/2015   • Wounds, multiple 04/23/2015   • Hydrocephalus 04/23/2015   • Dermatitis- chronic 03/24/2015   • Low blood pressure reading 03/22/2015   • Elevated ALT 11/04/2014   • Paresthesia of right arm 10/09/2014       Allergies:Prochlorperazine; Sumatriptan; and Vancomycin    Current Outpatient Prescriptions   Medication Sig Dispense Refill   • predniSONE (DELTASONE) 10 MG Tab Take 1 Tab by mouth every  "day. Take 40mg x 5 days, 30mg x 5 days, 20mg x 5 days, 10mg x 5 days and 5 mg x 5 days., 53 Tab 0   • aspirin (ASA) 81 MG Chew Tab chewable tablet Take 81 mg by mouth 2 Times a Day.     • multivitamin (THERAGRAN) Tab Take 1 Tab by mouth every day.     • risperidone (RISPERDAL) 0.5 MG Tab Take 1 mg by mouth 2 times a day.     • diphenhydrAMINE (BENADRYL) 50 MG/ML Solution 1 mL by Intramuscular route as needed. 25 Syringe 11   • ondansetron (ZOFRAN ODT) 4 MG TABLET DISPERSIBLE Take 1 Tab by mouth every 8 hours as needed for Nausea/Vomiting. 30 Tab 11   • omeprazole (PRILOSEC) 20 MG delayed-release capsule Take 1 Cap by mouth every day. 30 Cap 2   • gabapentin (NEURONTIN) 600 MG tablet Take 600 mg by mouth 3 times a day.     • oxycodone immediate release (ROXICODONE) 10 MG immediate release tablet Take 1 Tab by mouth every four hours as needed for Moderate Pain. 100 Tab 0   • duloxetine (CYMBALTA) 60 MG CPEP delayed-release capsule Take 60 mg by mouth 2 times a day.     • clindamycin (CLEOCIN) 300 MG Cap Take 1 Cap by mouth 3 times a day. 21 Cap 0   • ketorolac (TORADOL) 60 MG/2ML Solution 30 mg by Intramuscular route every 6 hours as needed (for migraine with benadryl). Not more than 2 days per week.       No current facility-administered medications for this visit.        Social History   Substance Use Topics   • Smoking status: Never Smoker   • Smokeless tobacco: Never Used   • Alcohol use Yes      Comment: Rare       ROS:  Review of Systems   Constitutional: Negative for fever, chills, weight loss and malaise/fatigue.   HENT: Negative for ear pain, nosebleeds, congestion, sore throat and neck pain.      All other systems reviewed and are negative except as in HPI.      Exam:  Blood pressure 118/60, pulse (!) 114, temperature 36.8 °C (98.2 °F), height 1.6 m (5' 3\"), weight 75.6 kg (166 lb 11.2 oz), SpO2 95 %.  General:  Well nourished, well developed female in NAD. Pleasant and cooperative  Head: NCAT, Pupils are " equal round reactive to light. Extraocular movements intact. Oropharynx is clear.  Neck: Supple.  No LAD  Pulmonary: Clear to ausculation.  No rales, rhonchi, or wheezing. Unlabored  Cardiovascular: Regular rate and rhythm without murmur. No ectopy  Abdominal: Soft, nontender, nondistended. Positive bowel sounds.   Skin: No rashes.  Extremities: no clubbing, cyanosis +bilateral swelling hands/wrists. Multiple ulcerations RLE approx 1 cm, 5 mm deep. Punches out appearance-in various stages of healing. Bilateral hand lesions same appearance  Neurologic: Alert and oriented x4. Speech is fluent without dysarthria. Cranial nerves intact. Moving all extremities. Gait within normal limits.      Assessment/Plan:  1. Wounds, multiple      Appear vasculitis-no associated cellulitis. Concern for bacterial superinfection. Given h/o Cdiff-use doxy instead of clinda 2-4 weeks   2. Seronegative rheumatoid arthritis (CMS-HCC)      Immunosuppressed-etiology unclear  repeat ASO titer   3. MRSA infection      h/o-not confirmed cause-abx as above   4. Intestinal infection due to Clostridium difficile      No current diarrhea-monitor for relapse closely while on abx        Marissa Garner M.D.

## 2017-11-06 ENCOUNTER — OFFICE VISIT (OUTPATIENT)
Dept: HEMATOLOGY ONCOLOGY | Facility: MEDICAL CENTER | Age: 47
End: 2017-11-06
Payer: MEDICARE

## 2017-11-06 ENCOUNTER — HOSPITAL ENCOUNTER (OUTPATIENT)
Facility: MEDICAL CENTER | Age: 47
End: 2017-11-06
Attending: INTERNAL MEDICINE
Payer: MEDICARE

## 2017-11-06 ENCOUNTER — HOSPITAL ENCOUNTER (OUTPATIENT)
Dept: LAB | Facility: MEDICAL CENTER | Age: 47
End: 2017-11-06
Attending: INTERNAL MEDICINE
Payer: MEDICARE

## 2017-11-06 VITALS
SYSTOLIC BLOOD PRESSURE: 132 MMHG | HEIGHT: 63 IN | HEART RATE: 126 BPM | TEMPERATURE: 96.3 F | OXYGEN SATURATION: 95 % | RESPIRATION RATE: 16 BRPM | WEIGHT: 167.11 LBS | BODY MASS INDEX: 29.61 KG/M2 | DIASTOLIC BLOOD PRESSURE: 82 MMHG

## 2017-11-06 DIAGNOSIS — I77.6 VASCULITIS (HCC): Primary | ICD-10-CM

## 2017-11-06 DIAGNOSIS — I77.6 VASCULITIS (HCC): ICD-10-CM

## 2017-11-06 LAB — CORTIS SERPL-MCNC: 1.9 UG/DL (ref 0–23)

## 2017-11-06 PROCEDURE — 85810 BLOOD VISCOSITY EXAMINATION: CPT

## 2017-11-06 PROCEDURE — 84165 PROTEIN E-PHORESIS SERUM: CPT

## 2017-11-06 PROCEDURE — 81240 F2 GENE: CPT

## 2017-11-06 PROCEDURE — 84160 ASSAY OF PROTEIN ANY SOURCE: CPT

## 2017-11-06 PROCEDURE — 99204 OFFICE O/P NEW MOD 45 MIN: CPT | Performed by: INTERNAL MEDICINE

## 2017-11-06 PROCEDURE — 36415 COLL VENOUS BLD VENIPUNCTURE: CPT

## 2017-11-06 PROCEDURE — 81241 F5 GENE: CPT

## 2017-11-06 PROCEDURE — 82533 TOTAL CORTISOL: CPT

## 2017-11-06 ASSESSMENT — PAIN SCALES - GENERAL: PAINLEVEL: 8=MODERATE-SEVERE PAIN

## 2017-11-06 NOTE — PROGRESS NOTES
Consult Note: Hematology    Date of consultation: 11/6/2017     Referring provider: Aimee Andrade M.D.    Reason for consultation:   CC: Vasculitis    History of presenting illness:   Enedelia Pang  is a 47 y.o. year old female who was in her usual state of health until about 3 years ago when she started to develop blisters on her hands and limbs. These blisters would ulcerate and then have intense hypopigmented scarring.  -She is seen by rheumatology for seronegative rheumatoid arthritis. Patient was on methotrexate in the past with improved symptoms however she had elevated LFTs. She was recently on steroids for swelling of the hands. She required hospitalization from October 14 to October 19, 2017.  The patient is referred to the hematology clinic to rule out any hematologic causes for hypercoagulability which may cause arterial insufficiency.    Past Medical History:    Past Medical History:   Diagnosis Date   • Allergy, unspecified not elsewhere classified    • Anemia 10/05/2017    Unsure if a current issue.   • Anxiety    • autoimmune    • Depression    • H/O Clostridium difficile infection    • Hx MRSA infection    • Hydrocephalus     with lumbar shunt   • Migraine with aura, with intractable migraine, so stated, without mention of status migrainosus    • MRSA (methicillin resistant Staphylococcus aureus)    • Pituitary abnormality (CMS-[facility])    • Staph infection    • Stroke (CMS-[facility])     2007       Past surgical history:    Past Surgical History:   Procedure Laterality Date   •  SHUNT INSERTION  5/31/2017    Procedure:  SHUNT INSERTION;  Surgeon: Drew Berry M.D.;  Location: [facility];  Service:    • SPINAL CORD STIMULATOR  12/19/2016    Procedure: SPINAL CORD STIMULATOR FOR: PLACEMENT OF LEFT FLANK OCCIPITAL NERVE STIMULATOR BATTERY PACK;  Surgeon: Oli hO III, M.D.;  Location: SURGERY [facility];  Service:    • LUMBAR EXPLORATION N/A 8/31/2015    Procedure: LUMBAR  EXPLORATION- Lumbar shunt removal ;  Surgeon: Oli Oh III, M.D.;  Location: SURGERY [facility];  Service:    • IRRIGATION & DEBRIDEMENT NEURO N/A 6/28/2015    Procedure: IRRIGATION & DEBRIDEMENT NEURO;  Surgeon: Oli Oh III, M.D.;  Location: SURGERY [facility];  Service:    • APPENDECTOMY     • CHOLECYSTECTOMY     • OTHER      right knee surgery   • OTHER NEUROLOGICAL SURG      occipital nerve stimulator   • TONSILLECTOMY     • TUBAL LIGATION         Allergies:  Prochlorperazine; Sumatriptan; and Vancomycin    Medications:    Current Outpatient Prescriptions   Medication Sig Dispense Refill   • clindamycin (CLEOCIN) 300 MG Cap Take 1 Cap by mouth 3 times a day. 21 Cap 0   • multivitamin (THERAGRAN) Tab Take 1 Tab by mouth every day.     • risperidone (RISPERDAL) 0.5 MG Tab Take 1 mg by mouth 2 times a day.     • ondansetron (ZOFRAN ODT) 4 MG TABLET DISPERSIBLE Take 1 Tab by mouth every 8 hours as needed for Nausea/Vomiting. 30 Tab 11   • omeprazole (PRILOSEC) 20 MG delayed-release capsule Take 1 Cap by mouth every day. 30 Cap 2   • gabapentin (NEURONTIN) 600 MG tablet Take 600 mg by mouth 3 times a day.     • oxycodone immediate release (ROXICODONE) 10 MG immediate release tablet Take 1 Tab by mouth every four hours as needed for Moderate Pain. 100 Tab 0   • duloxetine (CYMBALTA) 60 MG CPEP delayed-release capsule Take 60 mg by mouth 2 times a day.     • doxycycline (VIBRAMYCIN) 100 MG Tab Take 1 Tab by mouth 2 times a day. 60 Tab 0   • predniSONE (DELTASONE) 10 MG Tab Take 1 Tab by mouth every day. Take 40mg x 5 days, 30mg x 5 days, 20mg x 5 days, 10mg x 5 days and 5 mg x 5 days., 53 Tab 0   • aspirin (ASA) 81 MG Chew Tab chewable tablet Take 81 mg by mouth 2 Times a Day.     • ketorolac (TORADOL) 60 MG/2ML Solution 30 mg by Intramuscular route every 6 hours as needed (for migraine with benadryl). Not more than 2 days per week.     • diphenhydrAMINE (BENADRYL) 50 MG/ML Solution 1 mL by  "Intramuscular route as needed. 25 Syringe 11     No current facility-administered medications for this visit.        Social History:     Social History     Social History   • Marital status:      Spouse name: N/A   • Number of children: N/A   • Years of education: N/A     Occupational History   •       on disability     Social History Main Topics   • Smoking status: Never Smoker   • Smokeless tobacco: Never Used   • Alcohol use Yes      Comment: Rare   • Drug use: No   • Sexual activity: Not on file     Other Topics Concern   • Not on file     Social History Narrative   • No narrative on file       Family History:     Family History   Problem Relation Age of Onset   • No Known Problems Mother    • No Known Problems Father        Review of Systems:  Constitutional: No fever, chills, weight loss ,malaise/fatigue.      All other review of systems are negative except what was mentioned above in the HPI.    Physical Exam:  Vitals:   /82   Pulse (!) 126   Temp (!) 35.7 °C (96.3 °F)   Resp 16   Ht 1.6 m (5' 2.99\")   Wt 75.8 kg (167 lb 1.7 oz)   SpO2 95%   Breastfeeding? No   BMI 29.61 kg/m²     General: Not in acute distress, alert and oriented x 3  HEENT: No pallor, icterus. Oropharynx clear.   Neck: Supple, no palpable masses.  Lymph nodes: No palpable cervical, supraclavicular, axillary or inguinal lymphadenopathy.    CVS: regular rate and rhythm, no rubs or gallops  RESP: Clear to auscultate bilaterally, no wheezing or crackles.   ABD: Soft, non tender, non distended, positive bowel sounds, no palpable organomegaly  EXT: No edema or cyanosis  CNS: Alert and oriented x3, No focal deficits.  Skin- No rash      Labs:   Results for BRIGID DAWSON (MRN [id number]) as of 11/6/2017 10:16   10/19/2017 04:08   WBC 9.4   RBC 3.50 (L)   Hemoglobin 11.2 (L)   Hematocrit 33.3 (L)   MCV 95.1   MCH 32.0   MCHC 33.6   RDW 44.5   Platelet Count 299   MPV 10.2   Sodium 143   Potassium 3.8   Chloride 111 "   Co2 26   Anion Gap 6.0   Glucose 94   Bun 11   Creatinine 0.58   GFR If  >60   GFR If Non African American >60   Calcium 8.5   AST(SGOT) 12   ALT(SGPT) 16   Alkaline Phosphatase 68   Total Bilirubin 0.3   Albumin 3.4   Total Protein 5.6 (L)   Globulin 2.2   A-G Ratio 1.5   Magnesium 1.9   Protein C Functional 157   Protein S-Functional 79   Antithrombin III Ag Immuno 80 (L)   Antithrombin III, Functional 107      9/15/2017 15:45   Anti-Cariolipin Ab Igg 5   Anti-Cardiolipin Ab Iga 2   Anti-Cardiolipin Ab Igm 6   Beta-2 Glycoprotein I Ab Igg 0   Beta-2 Glycoprotein I Iga 0   Beta-2 Glycoprotein I Igm 2      9/15/2017 15:45   PT 12.9   INR 0.94   APTT 29.1   Dilute Ciro Viper Venom Filiberto 40.3   Lupus Anticoagulant Not Present           Assessment and Plan:  -Hypercoagulable workup  -The patient has had an excellent workup so far with protein C protein S antithrombin III levels, workup for lupus anticoagulant all of which has been negative.  -We'll check a factor V Leiden and prothrombin gene mutation. However these abnormalities are associated with venous related thrombotic events.  -We'll also check for hyperviscosity workup  -Agree with continuing aspirin  -Continue follow-up with rheumatology    -Iron deficiency anemia  -Seen in January 2017, associated with thrombocytosis. Resolved  -Source of iron loss unclear  -Continue following with GI and primary care physician          She agreed and verbalized her agreement and understanding with the current plan.  I answered all questions and concerns she has at this time.  Dear  Aimee Andrade M.D.,  Thank you for allowing me to participate in her care.    All labs and/or imaging studies mentioned in the note above were reviewed with and explained to the patient as a pertain to medical decision making.    Please note that this dictation was created using voice recognition software. I have made every reasonable attempt to correct obvious errors, but I  expect that there are errors of grammar and possibly content that I did not discover before finalizing the note.      SIGNATURES:  Art Head    CC:  ALFRED Blair Ruth, M.D.

## 2017-11-07 ENCOUNTER — HOSPITAL ENCOUNTER (OUTPATIENT)
Dept: RADIOLOGY | Facility: MEDICAL CENTER | Age: 47
End: 2017-11-07

## 2017-11-07 ENCOUNTER — HOSPITAL ENCOUNTER (EMERGENCY)
Facility: MEDICAL CENTER | Age: 47
End: 2017-11-07
Attending: EMERGENCY MEDICINE
Payer: MEDICARE

## 2017-11-07 VITALS
DIASTOLIC BLOOD PRESSURE: 71 MMHG | SYSTOLIC BLOOD PRESSURE: 106 MMHG | BODY MASS INDEX: 29.59 KG/M2 | OXYGEN SATURATION: 92 % | RESPIRATION RATE: 16 BRPM | HEIGHT: 63 IN | TEMPERATURE: 98.5 F | HEART RATE: 95 BPM | WEIGHT: 167 LBS

## 2017-11-07 DIAGNOSIS — R51.9 NONINTRACTABLE HEADACHE, UNSPECIFIED CHRONICITY PATTERN, UNSPECIFIED HEADACHE TYPE: ICD-10-CM

## 2017-11-07 PROCEDURE — 99284 EMERGENCY DEPT VISIT MOD MDM: CPT

## 2017-11-07 PROCEDURE — A9270 NON-COVERED ITEM OR SERVICE: HCPCS | Performed by: EMERGENCY MEDICINE

## 2017-11-07 PROCEDURE — 96372 THER/PROPH/DIAG INJ SC/IM: CPT

## 2017-11-07 PROCEDURE — 304562 HCHG STAT O2 MASK/CANNULA

## 2017-11-07 PROCEDURE — 700111 HCHG RX REV CODE 636 W/ 250 OVERRIDE (IP): Performed by: EMERGENCY MEDICINE

## 2017-11-07 PROCEDURE — 700102 HCHG RX REV CODE 250 W/ 637 OVERRIDE(OP): Performed by: EMERGENCY MEDICINE

## 2017-11-07 RX ORDER — MORPHINE SULFATE 4 MG/ML
4 INJECTION, SOLUTION INTRAMUSCULAR; INTRAVENOUS ONCE
Status: COMPLETED | OUTPATIENT
Start: 2017-11-07 | End: 2017-11-07

## 2017-11-07 RX ORDER — METOCLOPRAMIDE 10 MG/1
10 TABLET ORAL ONCE
Status: COMPLETED | OUTPATIENT
Start: 2017-11-07 | End: 2017-11-07

## 2017-11-07 RX ORDER — KETOROLAC TROMETHAMINE 30 MG/ML
30 INJECTION, SOLUTION INTRAMUSCULAR; INTRAVENOUS ONCE
Status: COMPLETED | OUTPATIENT
Start: 2017-11-07 | End: 2017-11-07

## 2017-11-07 RX ORDER — DIPHENHYDRAMINE HYDROCHLORIDE 50 MG/ML
50 INJECTION INTRAMUSCULAR; INTRAVENOUS ONCE
Status: COMPLETED | OUTPATIENT
Start: 2017-11-07 | End: 2017-11-07

## 2017-11-07 RX ADMIN — DIPHENHYDRAMINE HYDROCHLORIDE 50 MG: 50 INJECTION, SOLUTION INTRAMUSCULAR; INTRAVENOUS at 15:28

## 2017-11-07 RX ADMIN — MORPHINE SULFATE 4 MG: 4 INJECTION INTRAVENOUS at 14:08

## 2017-11-07 RX ADMIN — KETOROLAC TROMETHAMINE 30 MG: 30 INJECTION, SOLUTION INTRAMUSCULAR at 15:28

## 2017-11-07 RX ADMIN — METOCLOPRAMIDE HYDROCHLORIDE 10 MG: 10 TABLET ORAL at 14:02

## 2017-11-07 ASSESSMENT — PAIN SCALES - GENERAL
PAINLEVEL_OUTOF10: 8
PAINLEVEL_OUTOF10: 7
PAINLEVEL_OUTOF10: 8
PAINLEVEL_OUTOF10: 6

## 2017-11-07 ASSESSMENT — ENCOUNTER SYMPTOMS
FEVER: 1
ABDOMINAL PAIN: 0
COUGH: 0
NAUSEA: 1
VOMITING: 1
HEADACHES: 1
SHORTNESS OF BREATH: 0
LOSS OF CONSCIOUSNESS: 0

## 2017-11-07 NOTE — ED PROVIDER NOTES
"ED Provider Note    Scribed for Tyree Tiwari M.D. by Sher Sheth. 11/7/2017, 12:46 PM.    Primary care provider: Elliott Power M.D.  Means of arrival: EMS  History obtained from: patient  History limited by: none    CHIEF COMPLAINT  Chief Complaint   Patient presents with   • Headache     8/10 HA since 1800 yesterday with sensitity to light, hx of migraines,  shunt, pseudotumor cerebri, pituitary \"abnormality\"       HPI  Enedelia Pang is a 47 y.o. female with a history of migraines who presents to the Emergency Department complaining of sudden and worsening headache for the last 17 hours. Patient describes her headache as 8/10 severity. She localizes her headache behind her left eye. Patient states that this is the normal location of her migraines, but states that is accompanied by a fogginess that is abnormal. She reports associated nausea, vomiting, fever of 101 °F.  reports a history of  shunt placement 3 months ago for IV access. She states that she was at a wound care appointment for her hand when she became tremulous. Patient's fever was discovered at the clinic and transport initiated to the ED at [facility] for possible shunt infection and for consult with Dr. Ding (neurologist). Erika Reece (NP) is her attending neruologist. She has an appointment to have a port placed this Friday for chronic rheumatoid arthritis and headache. Patient denies loss of consciousness, cough, rhinorrhea, dysuria, acute diarrhea, chest pain, shortness of breath, abdominal pain.     REVIEW OF SYSTEMS  Review of Systems   Constitutional: Positive for fever.   HENT:        Negative rhinorrhea.    Respiratory: Negative for cough and shortness of breath.    Cardiovascular: Negative for chest pain.   Gastrointestinal: Positive for nausea and vomiting. Negative for abdominal pain.   Genitourinary: Negative for dysuria.   Neurological: Positive for headaches. Negative for loss of consciousness.   All other " systems reviewed and are negative.  C.    PAST MEDICAL HISTORY   has a past medical history of Allergy, unspecified not elsewhere classified; Anemia (10/05/2017); Anxiety; autoimmune; Depression; H/O Clostridium difficile infection; MRSA infection; Hydrocephalus; Migraine with aura, with intractable migraine, so stated, without mention of status migrainosus; MRSA (methicillin resistant Staphylococcus aureus); Pituitary abnormality (CMS-HCC); Staph infection; and Stroke (CMS-HCC).    SURGICAL HISTORY   has a past surgical history that includes cholecystectomy; tonsillectomy; appendectomy; tubal ligation; other; other neurological surg; irrigation & debridement neuro (N/A, 6/28/2015); lumbar exploration (N/A, 8/31/2015); spinal cord stimulator (12/19/2016); and  shunt insertion (5/31/2017).    SOCIAL HISTORY  Social History   Substance Use Topics   • Smoking status: Never Smoker   • Smokeless tobacco: Never Used   • Alcohol use Yes      Comment: Rare      History   Drug Use No       FAMILY HISTORY  Family History   Problem Relation Age of Onset   • No Known Problems Mother    • No Known Problems Father        CURRENT MEDICATIONS  No current facility-administered medications for this encounter.     Current Outpatient Prescriptions:   •  clindamycin (CLEOCIN) 300 MG Cap, Take 1 Cap by mouth 3 times a day., Disp: 21 Cap, Rfl: 0  •  doxycycline (VIBRAMYCIN) 100 MG Tab, Take 1 Tab by mouth 2 times a day., Disp: 60 Tab, Rfl: 0  •  predniSONE (DELTASONE) 10 MG Tab, Take 1 Tab by mouth every day. Take 40mg x 5 days, 30mg x 5 days, 20mg x 5 days, 10mg x 5 days and 5 mg x 5 days.,, Disp: 53 Tab, Rfl: 0  •  aspirin (ASA) 81 MG Chew Tab chewable tablet, Take 81 mg by mouth 2 Times a Day., Disp: , Rfl:   •  multivitamin (THERAGRAN) Tab, Take 1 Tab by mouth every day., Disp: , Rfl:   •  risperidone (RISPERDAL) 0.5 MG Tab, Take 1 mg by mouth 2 times a day., Disp: , Rfl:   •  ketorolac (TORADOL) 60 MG/2ML Solution, 30 mg by  "Intramuscular route every 6 hours as needed (for migraine with benadryl). Not more than 2 days per week., Disp: , Rfl:   •  diphenhydrAMINE (BENADRYL) 50 MG/ML Solution, 1 mL by Intramuscular route as needed., Disp: 25 Syringe, Rfl: 11  •  ondansetron (ZOFRAN ODT) 4 MG TABLET DISPERSIBLE, Take 1 Tab by mouth every 8 hours as needed for Nausea/Vomiting., Disp: 30 Tab, Rfl: 11  •  omeprazole (PRILOSEC) 20 MG delayed-release capsule, Take 1 Cap by mouth every day., Disp: 30 Cap, Rfl: 2  •  gabapentin (NEURONTIN) 600 MG tablet, Take 600 mg by mouth 3 times a day., Disp: , Rfl:   •  oxycodone immediate release (ROXICODONE) 10 MG immediate release tablet, Take 1 Tab by mouth every four hours as needed for Moderate Pain., Disp: 100 Tab, Rfl: 0  •  duloxetine (CYMBALTA) 60 MG CPEP delayed-release capsule, Take 60 mg by mouth 2 times a day., Disp: , Rfl:       ALLERGIES  Allergies   Allergen Reactions   • Prochlorperazine Swelling     Tongue swelling. Reaction as a teen. (compazine)  Tolerated Phenergan on 02/24/15   • Sumatriptan Unspecified     Historical=\"Heart stops.\" Reaction  between 1995 to 1997   • Vancomycin Shortness of Breath and Rash     Reaction in 2005.  D/W patient 8/31/15 - tolerated loading dose of vancomycin administered on 8/30/15 with some itching to chest with decreased infusion rate. Red Man Syndrome       PHYSICAL EXAM  VITAL SIGNS: /56   Pulse (!) 114   Temp 37.4 °C (99.4 °F)   Resp 16   Ht 1.6 m (5' 3\")   Wt 75.8 kg (167 lb)   SpO2 94%   BMI 29.58 kg/m²     Constitutional:  No acute distress. Appears uncomfortable.   HENT:  Moist mucous membranes.  shunt in right scalp. Bubble refills appropriately after compression.   Eyes: PERRL, No conjunctivitis or icterus  Neck: No meningismus.  trachea is midline, no palpable thyroid  Lymphatic:  No cervical lymphadenopathy  Cardiovascular: Tachycardic rate, regualr rhythm, no murmurs  Thorax & Lungs:  Normal breath sounds, no rhonchi  Abdomen:  " Soft, Non-tender  Skin:.  no rash  Back:  Non-tender, no CVA tenderness  Extremities:   no edema  Vascular:  symmetric radial pulse  Neurologic:  Normal gross motor    COURSE & MEDICAL DECISION MAKING  Pertinent Labs & Imaging studies reviewed. (See chart for details)    Review of past medical records shows normal WBC, CMP glucose 139, Lactic acid 2.2. CT head normal  shunt placement, no hydrocephalus.      12:46 PM - Patient seen and examined at bedside. I inormed the patient that there is no indication of meningitis or other emergent processes at work that would prompt a interventions in the ED. I informed her and her  that neurology will be consulted with her transfer paperwork. She will likely be discharged and instructed to follow up with neurology. Patient and  verbalize understanding and agreement to this plan of care. The differential diagnoses include but are not limited to: migraine, shunt abnormality    1:09 PM - Paged neurologist.     1:18 PM - I discussed the patient's case and the above findings with Dr. Baumann (neurology) who agrees to consult with the patient as an outpatient.      2:03 PM - Patient reevaluated at bedside. Discussed her persistent tachycardia.  I informed her of the discharge and follow up plans with neurology. Her discharge will be postponed until her headache improves. She will be treated with Reglan 10 mg, morphine 4 mg. Patient verbalizes understanding and agreement to this plan of care.    4:22 PM - Patient reevaluated at bedside. Patient reports that she feels improved. I reiterated her discharge instructions. Patient is instructed to return with any new or worsening symptoms, specifically if she develops a fever.     Medical Decision Making:  The patient was transferred for evaluation. She has had a  shunt placed at  normal motor activity shaking type with full consciousness sounds very atypical for seizure. Her labs shows no elevation in white count she has  no fever no nuchal rigidity I tested the reservoir of the  shunt it drains fine. I did discuss the case with Dr. Vargas at this time it does not appear to be a shunt complication. The CT has had normal. She has no fever no neck vein distention treated for headache migraine type. I am discharging her with return precautions and follow-up    The patient will return for new or worsening symptoms and is stable at the time of discharge.    The patient is referred to a primary physician for blood pressure management, diabetic screening, and for all other preventative health concerns.    DISPOSITION:  Patient will be discharged home in stable condition.    FOLLOW UP:  Elliott Power M.D.  [address]  [place] [address]  [phone]            OUTPATIENT MEDICATIONS:  New Prescriptions    No medications on file         FINAL IMPRESSION  1. Nonintractable headache, unspecified chronicity pattern, unspecified headache type          I, Sher Sheth (Victorinoibe), am scribing for, and in the presence of, Tyree Tiwari M.D..    Electronically signed by: Sher Sheth (Scribe), 11/7/2017    ITyree M.D. personally performed the services described in this documentation, as scribed by Sher Sheth in my presence, and it is both accurate and complete.    The note accurately reflects work and decisions made by me.  Tyree Tiwari  11/7/2017  9:05 PM

## 2017-11-08 ENCOUNTER — OFFICE VISIT (OUTPATIENT)
Dept: NEUROLOGY | Facility: MEDICAL CENTER | Age: 47
End: 2017-11-08
Payer: MEDICARE

## 2017-11-08 ENCOUNTER — PATIENT OUTREACH (OUTPATIENT)
Dept: HEALTH INFORMATION MANAGEMENT | Facility: OTHER | Age: 47
End: 2017-11-08

## 2017-11-08 VITALS
SYSTOLIC BLOOD PRESSURE: 130 MMHG | HEART RATE: 125 BPM | BODY MASS INDEX: 29.59 KG/M2 | HEIGHT: 63 IN | TEMPERATURE: 97.6 F | OXYGEN SATURATION: 95 % | WEIGHT: 167 LBS | DIASTOLIC BLOOD PRESSURE: 80 MMHG

## 2017-11-08 DIAGNOSIS — R09.02 HYPOXIA: ICD-10-CM

## 2017-11-08 DIAGNOSIS — R94.01 ABNORMAL EEG: ICD-10-CM

## 2017-11-08 LAB
ALBUMIN SERPL-MCNC: 4.69 G/DL (ref 3.75–5.01)
ALPHA1 GLOB SERPL ELPH-MCNC: 0.34 G/DL (ref 0.19–0.46)
ALPHA2 GLOB SERPL ELPH-MCNC: 0.74 G/DL (ref 0.48–1.05)
B-GLOBULIN SERPL ELPH-MCNC: 0.89 G/DL (ref 0.48–1.1)
F2 C.20210G>A GENO BLD/T: NEGATIVE
GAMMA GLOB SERPL ELPH-MCNC: 0.86 G/DL (ref 0.62–1.51)
INTERPRETATION SERPL IFE-IMP: NORMAL
MONOCLON BAND OBS SERPL: NORMAL
PATHOLOGY STUDY: NORMAL
PROT SERPL-MCNC: 7.5 G/DL (ref 6–8.3)
VISC SER: 1.28 CP (ref 1.1–1.8)

## 2017-11-08 PROCEDURE — 99213 OFFICE O/P EST LOW 20 MIN: CPT | Performed by: PHYSICIAN ASSISTANT

## 2017-11-08 RX ORDER — DICYCLOMINE HYDROCHLORIDE 10 MG/1
CAPSULE ORAL
Status: ON HOLD | COMMUNITY
Start: 2017-08-14 | End: 2017-11-18

## 2017-11-08 NOTE — ED NOTES
"Pain reassessed, 6/10 HA at this time, patient states \"I think I'm ready to go\", ERP notified patient ready for discharge, awaiting orders.    "

## 2017-11-08 NOTE — PROGRESS NOTES
Cc:  Follow up EEG    Pt I saw in clinic regarding headaches.  I also ordered an EEG as she reported having strange sensation when she sees flashing lights followed by onset headache.  She also has hx of stroke.  EEG read by Dr. Ribera was considered abnormal and 3 day ambulatory EEG was recommended.    I have referred pt for follow up with Dr. Ribera and also ordered EEG ambulatory as suggested.    Pt has appt scheduled to go see doctor in [place] and so I printed off a copy of this routine EEG for her but also directed her to completed medical records form with .    Pt stated that she was told recently that her oxygen overnight while sleeping dipped into the 60's.  She requests referral for further evaluation.  I suggested she ask Dr. Power her PCP but he refers only to [facility] and they would prefer to keep results within [facility] system for continuity of care.  Accordingly, I have made a referral to pulmonology for further evaluation.    Pt has chosen to receive additional trigger point injections and botox from her pain management doctors but overall with regard to headache she feels she is improving.    Total time with this visit:  15   Minutes face-to-face with patient. More than 50% of this visit was spent educating patient on their illness and/or coordinating care, as detailed above

## 2017-11-08 NOTE — DISCHARGE INSTRUCTIONS
General Headache Without Cause  A headache is pain or discomfort felt around the head or neck area. The specific cause of a headache may not be found. There are many causes and types of headaches. A few common ones are:  · Tension headaches.  · Migraine headaches.  · Cluster headaches.  · Chronic daily headaches.  HOME CARE INSTRUCTIONS   · Keep all follow-up appointments with your health care provider or any specialist referral.  · Only take over-the-counter or prescription medicines for pain or discomfort as directed by your health care provider.  · Lie down in a dark, quiet room when you have a headache.  · Keep a headache journal to find out what may trigger your migraine headaches. For example, write down:  ¨ What you eat and drink.  ¨ How much sleep you get.  ¨ Any change to your diet or medicines.  · Try massage or other relaxation techniques.  · Put ice packs or heat on the head and neck. Use these 3 to 4 times per day for 15 to 20 minutes each time, or as needed.  · Limit stress.  · Sit up straight, and do not tense your muscles.  · Quit smoking if you smoke.  · Limit alcohol use.  · Decrease the amount of caffeine you drink, or stop drinking caffeine.  · Eat and sleep on a regular schedule.  · Get 7 to 9 hours of sleep, or as recommended by your health care provider.  · Keep lights dim if bright lights bother you and make your headaches worse.  SEEK MEDICAL CARE IF:   · You have problems with the medicines you were prescribed.  · Your medicines are not working.  · You have a change from the usual headache.  · You have nausea or vomiting.  SEEK IMMEDIATE MEDICAL CARE IF:   · Your headache becomes severe.  · You have a fever.  · You have a stiff neck.  · You have loss of vision.  · You have muscular weakness or loss of muscle control.  · You start losing your balance or have trouble walking.  · You feel faint or pass out.  · You have severe symptoms that are different from your first symptoms.     This  information is not intended to replace advice given to you by your health care provider. Make sure you discuss any questions you have with your health care provider.     Document Released: 12/18/2006 Document Revised: 05/03/2016 Document Reviewed: 01/02/2013  [employer] Patient Education ©2016 [employer]

## 2017-11-08 NOTE — ED NOTES
Patient and significant other given discharge instructions and follow up information, verbalized understanding, ambulatory out of ED w/steady gait.

## 2017-11-09 LAB — F5 P.R506Q BLD/T QL: NEGATIVE

## 2017-11-16 ENCOUNTER — HOSPITAL ENCOUNTER (INPATIENT)
Facility: MEDICAL CENTER | Age: 47
LOS: 5 days | DRG: 880 | End: 2017-11-22
Attending: EMERGENCY MEDICINE | Admitting: INTERNAL MEDICINE
Payer: MEDICARE

## 2017-11-16 DIAGNOSIS — R51.9 CHRONIC INTRACTABLE HEADACHE, UNSPECIFIED HEADACHE TYPE: ICD-10-CM

## 2017-11-16 DIAGNOSIS — R56.9 SEIZURES (HCC): ICD-10-CM

## 2017-11-16 DIAGNOSIS — G89.29 CHRONIC INTRACTABLE HEADACHE, UNSPECIFIED HEADACHE TYPE: ICD-10-CM

## 2017-11-16 LAB
ANION GAP SERPL CALC-SCNC: 8 MMOL/L (ref 0–11.9)
BASOPHILS # BLD AUTO: 0.3 % (ref 0–1.8)
BASOPHILS # BLD: 0.02 K/UL (ref 0–0.12)
BUN SERPL-MCNC: 8 MG/DL (ref 8–22)
CALCIUM SERPL-MCNC: 9 MG/DL (ref 8.5–10.5)
CHLORIDE SERPL-SCNC: 103 MMOL/L (ref 96–112)
CO2 SERPL-SCNC: 28 MMOL/L (ref 20–33)
CREAT SERPL-MCNC: 0.52 MG/DL (ref 0.5–1.4)
EOSINOPHIL # BLD AUTO: 0.01 K/UL (ref 0–0.51)
EOSINOPHIL NFR BLD: 0.2 % (ref 0–6.9)
ERYTHROCYTE [DISTWIDTH] IN BLOOD BY AUTOMATED COUNT: 47.5 FL (ref 35.9–50)
GFR SERPL CREATININE-BSD FRML MDRD: >60 ML/MIN/1.73 M 2
GLUCOSE SERPL-MCNC: 108 MG/DL (ref 65–99)
HCT VFR BLD AUTO: 41.9 % (ref 37–47)
HGB BLD-MCNC: 13.4 G/DL (ref 12–16)
IMM GRANULOCYTES # BLD AUTO: 0.02 K/UL (ref 0–0.11)
IMM GRANULOCYTES NFR BLD AUTO: 0.3 % (ref 0–0.9)
LYMPHOCYTES # BLD AUTO: 1.15 K/UL (ref 1–4.8)
LYMPHOCYTES NFR BLD: 18.8 % (ref 22–41)
MCH RBC QN AUTO: 30.4 PG (ref 27–33)
MCHC RBC AUTO-ENTMCNC: 32 G/DL (ref 33.6–35)
MCV RBC AUTO: 95 FL (ref 81.4–97.8)
MONOCYTES # BLD AUTO: 0.44 K/UL (ref 0–0.85)
MONOCYTES NFR BLD AUTO: 7.2 % (ref 0–13.4)
NEUTROPHILS # BLD AUTO: 4.48 K/UL (ref 2–7.15)
NEUTROPHILS NFR BLD: 73.2 % (ref 44–72)
NRBC # BLD AUTO: 0 K/UL
NRBC BLD AUTO-RTO: 0 /100 WBC
PLATELET # BLD AUTO: 307 K/UL (ref 164–446)
PMV BLD AUTO: 9.7 FL (ref 9–12.9)
POTASSIUM SERPL-SCNC: 4 MMOL/L (ref 3.6–5.5)
RBC # BLD AUTO: 4.41 M/UL (ref 4.2–5.4)
SODIUM SERPL-SCNC: 139 MMOL/L (ref 135–145)
WBC # BLD AUTO: 6.1 K/UL (ref 4.8–10.8)

## 2017-11-16 PROCEDURE — 36415 COLL VENOUS BLD VENIPUNCTURE: CPT

## 2017-11-16 PROCEDURE — 84443 ASSAY THYROID STIM HORMONE: CPT

## 2017-11-16 PROCEDURE — 94760 N-INVAS EAR/PLS OXIMETRY 1: CPT

## 2017-11-16 PROCEDURE — 83036 HEMOGLOBIN GLYCOSYLATED A1C: CPT

## 2017-11-16 PROCEDURE — 85025 COMPLETE CBC W/AUTO DIFF WBC: CPT

## 2017-11-16 PROCEDURE — 83735 ASSAY OF MAGNESIUM: CPT

## 2017-11-16 PROCEDURE — 99285 EMERGENCY DEPT VISIT HI MDM: CPT

## 2017-11-16 PROCEDURE — 80048 BASIC METABOLIC PNL TOTAL CA: CPT

## 2017-11-16 PROCEDURE — 700111 HCHG RX REV CODE 636 W/ 250 OVERRIDE (IP): Performed by: EMERGENCY MEDICINE

## 2017-11-16 PROCEDURE — 96374 THER/PROPH/DIAG INJ IV PUSH: CPT

## 2017-11-16 RX ORDER — LORAZEPAM 2 MG/ML
1 INJECTION INTRAMUSCULAR ONCE
Status: COMPLETED | OUTPATIENT
Start: 2017-11-16 | End: 2017-11-16

## 2017-11-16 RX ADMIN — LORAZEPAM 1 MG: 2 INJECTION INTRAMUSCULAR; INTRAVENOUS at 23:05

## 2017-11-16 ASSESSMENT — PAIN SCALES - GENERAL: PAINLEVEL_OUTOF10: ASSUMED PAIN PRESENT

## 2017-11-17 ENCOUNTER — RESOLUTE PROFESSIONAL BILLING HOSPITAL PROF FEE (OUTPATIENT)
Dept: HOSPITALIST | Facility: MEDICAL CENTER | Age: 47
End: 2017-11-17
Payer: MEDICARE

## 2017-11-17 ENCOUNTER — APPOINTMENT (OUTPATIENT)
Dept: RADIOLOGY | Facility: MEDICAL CENTER | Age: 47
DRG: 880 | End: 2017-11-17
Attending: STUDENT IN AN ORGANIZED HEALTH CARE EDUCATION/TRAINING PROGRAM
Payer: MEDICARE

## 2017-11-17 PROBLEM — R56.9 SEIZURES (HCC): Status: ACTIVE | Noted: 2017-11-17

## 2017-11-17 PROBLEM — G93.2 PSEUDOTUMOR CEREBRI: Status: ACTIVE | Noted: 2017-11-17

## 2017-11-17 LAB
ALBUMIN SERPL BCP-MCNC: 3.9 G/DL (ref 3.2–4.9)
ALBUMIN/GLOB SERPL: 1.5 G/DL
ALP SERPL-CCNC: 112 U/L (ref 30–99)
ALT SERPL-CCNC: 85 U/L (ref 2–50)
ANION GAP SERPL CALC-SCNC: 5 MMOL/L (ref 0–11.9)
AST SERPL-CCNC: 84 U/L (ref 12–45)
BASE EXCESS BLDA CALC-SCNC: 0 MMOL/L (ref -4–3)
BILIRUB SERPL-MCNC: 0.4 MG/DL (ref 0.1–1.5)
BODY TEMPERATURE: ABNORMAL CENTIGRADE
BUN SERPL-MCNC: 7 MG/DL (ref 8–22)
CALCIUM SERPL-MCNC: 8.9 MG/DL (ref 8.5–10.5)
CHLORIDE SERPL-SCNC: 104 MMOL/L (ref 96–112)
CK SERPL-CCNC: 32 U/L (ref 0–154)
CO2 SERPL-SCNC: 30 MMOL/L (ref 20–33)
CREAT SERPL-MCNC: 0.57 MG/DL (ref 0.5–1.4)
EKG IMPRESSION: NORMAL
EST. AVERAGE GLUCOSE BLD GHB EST-MCNC: 97 MG/DL
GFR SERPL CREATININE-BSD FRML MDRD: >60 ML/MIN/1.73 M 2
GLOBULIN SER CALC-MCNC: 2.6 G/DL (ref 1.9–3.5)
GLUCOSE SERPL-MCNC: 104 MG/DL (ref 65–99)
HBA1C MFR BLD: 5 % (ref 0–5.6)
HCO3 BLDA-SCNC: 26 MMOL/L (ref 17–25)
INHALED O2 FLOW RATE: 2 L/MIN (ref 2–10)
MAGNESIUM SERPL-MCNC: 1.9 MG/DL (ref 1.5–2.5)
PCO2 BLDA: 46.4 MMHG (ref 26–37)
PH BLDA: 7.37 [PH] (ref 7.4–7.5)
PO2 BLDA: 87.9 MMHG (ref 64–87)
POTASSIUM SERPL-SCNC: 4.1 MMOL/L (ref 3.6–5.5)
PROT SERPL-MCNC: 6.5 G/DL (ref 6–8.2)
SAO2 % BLDA: 96.1 % (ref 93–99)
SODIUM SERPL-SCNC: 139 MMOL/L (ref 135–145)
TSH SERPL DL<=0.005 MIU/L-ACNC: 1.98 UIU/ML (ref 0.3–3.7)

## 2017-11-17 PROCEDURE — 93010 ELECTROCARDIOGRAM REPORT: CPT | Performed by: INTERNAL MEDICINE

## 2017-11-17 PROCEDURE — 95951 EEG: CPT | Mod: 52

## 2017-11-17 PROCEDURE — 700111 HCHG RX REV CODE 636 W/ 250 OVERRIDE (IP): Performed by: STUDENT IN AN ORGANIZED HEALTH CARE EDUCATION/TRAINING PROGRAM

## 2017-11-17 PROCEDURE — 700111 HCHG RX REV CODE 636 W/ 250 OVERRIDE (IP): Performed by: INTERNAL MEDICINE

## 2017-11-17 PROCEDURE — 80053 COMPREHEN METABOLIC PANEL: CPT

## 2017-11-17 PROCEDURE — 700105 HCHG RX REV CODE 258: Performed by: SPECIALIST

## 2017-11-17 PROCEDURE — 770006 HCHG ROOM/CARE - MED/SURG/GYN SEMI*

## 2017-11-17 PROCEDURE — 700102 HCHG RX REV CODE 250 W/ 637 OVERRIDE(OP): Performed by: STUDENT IN AN ORGANIZED HEALTH CARE EDUCATION/TRAINING PROGRAM

## 2017-11-17 PROCEDURE — 82550 ASSAY OF CK (CPK): CPT

## 2017-11-17 PROCEDURE — 302128 INFUSION PUMP W/POLE: Performed by: INTERNAL MEDICINE

## 2017-11-17 PROCEDURE — 770020 HCHG ROOM/CARE - TELE (206)

## 2017-11-17 PROCEDURE — 700102 HCHG RX REV CODE 250 W/ 637 OVERRIDE(OP): Performed by: INTERNAL MEDICINE

## 2017-11-17 PROCEDURE — 93005 ELECTROCARDIOGRAM TRACING: CPT | Performed by: STUDENT IN AN ORGANIZED HEALTH CARE EDUCATION/TRAINING PROGRAM

## 2017-11-17 PROCEDURE — 70450 CT HEAD/BRAIN W/O DYE: CPT

## 2017-11-17 PROCEDURE — 700105 HCHG RX REV CODE 258: Performed by: INTERNAL MEDICINE

## 2017-11-17 PROCEDURE — 700105 HCHG RX REV CODE 258: Performed by: STUDENT IN AN ORGANIZED HEALTH CARE EDUCATION/TRAINING PROGRAM

## 2017-11-17 PROCEDURE — 94760 N-INVAS EAR/PLS OXIMETRY 1: CPT

## 2017-11-17 PROCEDURE — 700111 HCHG RX REV CODE 636 W/ 250 OVERRIDE (IP): Performed by: SPECIALIST

## 2017-11-17 PROCEDURE — A9270 NON-COVERED ITEM OR SERVICE: HCPCS | Performed by: INTERNAL MEDICINE

## 2017-11-17 PROCEDURE — A9270 NON-COVERED ITEM OR SERVICE: HCPCS | Performed by: STUDENT IN AN ORGANIZED HEALTH CARE EDUCATION/TRAINING PROGRAM

## 2017-11-17 PROCEDURE — 51798 US URINE CAPACITY MEASURE: CPT

## 2017-11-17 PROCEDURE — 99223 1ST HOSP IP/OBS HIGH 75: CPT | Mod: AI,GC | Performed by: INTERNAL MEDICINE

## 2017-11-17 PROCEDURE — 82803 BLOOD GASES ANY COMBINATION: CPT

## 2017-11-17 RX ORDER — OXYCODONE HYDROCHLORIDE 10 MG/1
10 TABLET ORAL EVERY 4 HOURS PRN
Status: DISCONTINUED | OUTPATIENT
Start: 2017-11-17 | End: 2017-11-17

## 2017-11-17 RX ORDER — LORAZEPAM 2 MG/ML
2 INJECTION INTRAMUSCULAR EVERY 4 HOURS PRN
Status: DISCONTINUED | OUTPATIENT
Start: 2017-11-17 | End: 2017-11-17

## 2017-11-17 RX ORDER — ASPIRIN 81 MG/1
81 TABLET, CHEWABLE ORAL 2 TIMES DAILY
Status: DISCONTINUED | OUTPATIENT
Start: 2017-11-17 | End: 2017-11-21

## 2017-11-17 RX ORDER — ONDANSETRON 4 MG/1
4 TABLET, ORALLY DISINTEGRATING ORAL EVERY 4 HOURS PRN
Status: DISCONTINUED | OUTPATIENT
Start: 2017-11-17 | End: 2017-11-22 | Stop reason: HOSPADM

## 2017-11-17 RX ORDER — DOXYCYCLINE 100 MG/1
100 TABLET ORAL EVERY 12 HOURS
Status: DISCONTINUED | OUTPATIENT
Start: 2017-11-17 | End: 2017-11-21

## 2017-11-17 RX ORDER — RISPERIDONE 0.5 MG/1
1 TABLET ORAL 2 TIMES DAILY
Status: DISCONTINUED | OUTPATIENT
Start: 2017-11-17 | End: 2017-11-22 | Stop reason: HOSPADM

## 2017-11-17 RX ORDER — LORAZEPAM 2 MG/ML
4 INJECTION INTRAMUSCULAR
Status: DISCONTINUED | OUTPATIENT
Start: 2017-11-17 | End: 2017-11-22 | Stop reason: HOSPADM

## 2017-11-17 RX ORDER — DIPHENHYDRAMINE HYDROCHLORIDE 50 MG/ML
50 INJECTION INTRAMUSCULAR; INTRAVENOUS
Status: DISCONTINUED | OUTPATIENT
Start: 2017-11-17 | End: 2017-11-17

## 2017-11-17 RX ORDER — LORAZEPAM 2 MG/ML
1 INJECTION INTRAMUSCULAR EVERY 6 HOURS PRN
Status: DISCONTINUED | OUTPATIENT
Start: 2017-11-17 | End: 2017-11-17

## 2017-11-17 RX ORDER — ONDANSETRON 2 MG/ML
4 INJECTION INTRAMUSCULAR; INTRAVENOUS EVERY 4 HOURS PRN
Status: DISCONTINUED | OUTPATIENT
Start: 2017-11-17 | End: 2017-11-22 | Stop reason: HOSPADM

## 2017-11-17 RX ORDER — OMEPRAZOLE 20 MG/1
20 CAPSULE, DELAYED RELEASE ORAL DAILY
Status: DISCONTINUED | OUTPATIENT
Start: 2017-11-17 | End: 2017-11-22 | Stop reason: HOSPADM

## 2017-11-17 RX ORDER — THIAMINE MONONITRATE (VIT B1) 100 MG
100 TABLET ORAL DAILY
Status: DISCONTINUED | OUTPATIENT
Start: 2017-11-17 | End: 2017-11-22 | Stop reason: HOSPADM

## 2017-11-17 RX ORDER — KETOROLAC TROMETHAMINE 30 MG/ML
30 INJECTION, SOLUTION INTRAMUSCULAR; INTRAVENOUS EVERY 6 HOURS PRN
Status: DISCONTINUED | OUTPATIENT
Start: 2017-11-17 | End: 2017-11-18

## 2017-11-17 RX ORDER — BISACODYL 10 MG
10 SUPPOSITORY, RECTAL RECTAL
Status: DISCONTINUED | OUTPATIENT
Start: 2017-11-17 | End: 2017-11-22 | Stop reason: HOSPADM

## 2017-11-17 RX ORDER — DULOXETIN HYDROCHLORIDE 60 MG/1
60 CAPSULE, DELAYED RELEASE ORAL 2 TIMES DAILY
Status: DISCONTINUED | OUTPATIENT
Start: 2017-11-17 | End: 2017-11-22 | Stop reason: HOSPADM

## 2017-11-17 RX ORDER — DIPHENHYDRAMINE HYDROCHLORIDE 50 MG/ML
25 INJECTION INTRAMUSCULAR; INTRAVENOUS EVERY 6 HOURS PRN
Status: DISCONTINUED | OUTPATIENT
Start: 2017-11-17 | End: 2017-11-17

## 2017-11-17 RX ORDER — SODIUM CHLORIDE 9 MG/ML
INJECTION, SOLUTION INTRAVENOUS CONTINUOUS
Status: DISCONTINUED | OUTPATIENT
Start: 2017-11-17 | End: 2017-11-22 | Stop reason: HOSPADM

## 2017-11-17 RX ORDER — ACETAMINOPHEN 650 MG/1
325 SUPPOSITORY RECTAL ONCE
Status: DISCONTINUED | OUTPATIENT
Start: 2017-11-17 | End: 2017-11-17

## 2017-11-17 RX ORDER — AMOXICILLIN 250 MG
2 CAPSULE ORAL 2 TIMES DAILY
Status: DISCONTINUED | OUTPATIENT
Start: 2017-11-17 | End: 2017-11-22 | Stop reason: HOSPADM

## 2017-11-17 RX ORDER — KETOROLAC TROMETHAMINE 30 MG/ML
30 INJECTION, SOLUTION INTRAMUSCULAR; INTRAVENOUS EVERY 6 HOURS PRN
Status: DISCONTINUED | OUTPATIENT
Start: 2017-11-17 | End: 2017-11-17

## 2017-11-17 RX ORDER — POLYETHYLENE GLYCOL 3350 17 G/17G
1 POWDER, FOR SOLUTION ORAL
Status: DISCONTINUED | OUTPATIENT
Start: 2017-11-17 | End: 2017-11-22 | Stop reason: HOSPADM

## 2017-11-17 RX ORDER — LABETALOL HYDROCHLORIDE 5 MG/ML
10 INJECTION, SOLUTION INTRAVENOUS EVERY 4 HOURS PRN
Status: DISCONTINUED | OUTPATIENT
Start: 2017-11-17 | End: 2017-11-22 | Stop reason: HOSPADM

## 2017-11-17 RX ORDER — IBUPROFEN 800 MG/1
800 TABLET ORAL ONCE
Status: DISCONTINUED | OUTPATIENT
Start: 2017-11-17 | End: 2017-11-17

## 2017-11-17 RX ORDER — IBUPROFEN 800 MG/1
800 TABLET ORAL EVERY 6 HOURS PRN
Status: DISCONTINUED | OUTPATIENT
Start: 2017-11-17 | End: 2017-11-17

## 2017-11-17 RX ORDER — GABAPENTIN 300 MG/1
300 CAPSULE ORAL 3 TIMES DAILY
Status: DISCONTINUED | OUTPATIENT
Start: 2017-11-17 | End: 2017-11-22 | Stop reason: HOSPADM

## 2017-11-17 RX ADMIN — RISPERIDONE 1 MG: 1 TABLET, FILM COATED ORAL at 22:02

## 2017-11-17 RX ADMIN — THERA TABS 1 TABLET: TAB at 08:28

## 2017-11-17 RX ADMIN — DIPHENHYDRAMINE HYDROCHLORIDE 50 MG: 50 INJECTION, SOLUTION INTRAMUSCULAR; INTRAVENOUS at 02:07

## 2017-11-17 RX ADMIN — RISPERIDONE 1 MG: 1 TABLET, FILM COATED ORAL at 08:28

## 2017-11-17 RX ADMIN — KETOROLAC TROMETHAMINE 30 MG: 30 INJECTION, SOLUTION INTRAMUSCULAR at 02:07

## 2017-11-17 RX ADMIN — KETOROLAC TROMETHAMINE 30 MG: 30 INJECTION, SOLUTION INTRAMUSCULAR at 07:45

## 2017-11-17 RX ADMIN — GABAPENTIN 300 MG: 300 CAPSULE ORAL at 16:17

## 2017-11-17 RX ADMIN — Medication 100 MG: at 12:48

## 2017-11-17 RX ADMIN — GABAPENTIN 300 MG: 300 CAPSULE ORAL at 08:28

## 2017-11-17 RX ADMIN — DOXYCYCLINE 100 MG: 100 TABLET ORAL at 22:03

## 2017-11-17 RX ADMIN — STANDARDIZED SENNA CONCENTRATE AND DOCUSATE SODIUM 2 TABLET: 8.6; 5 TABLET, FILM COATED ORAL at 22:04

## 2017-11-17 RX ADMIN — SODIUM CHLORIDE: 9 INJECTION, SOLUTION INTRAVENOUS at 22:04

## 2017-11-17 RX ADMIN — HYDROMORPHONE HYDROCHLORIDE 0.5 MG: 1 INJECTION, SOLUTION INTRAMUSCULAR; INTRAVENOUS; SUBCUTANEOUS at 02:08

## 2017-11-17 RX ADMIN — DOXYCYCLINE 100 MG: 100 TABLET ORAL at 08:27

## 2017-11-17 RX ADMIN — DEXTROSE MONOHYDRATE 500 MG: 50 INJECTION, SOLUTION INTRAVENOUS at 22:04

## 2017-11-17 RX ADMIN — GABAPENTIN 300 MG: 300 CAPSULE ORAL at 22:03

## 2017-11-17 RX ADMIN — ENOXAPARIN SODIUM 40 MG: 100 INJECTION SUBCUTANEOUS at 08:29

## 2017-11-17 RX ADMIN — STANDARDIZED SENNA CONCENTRATE AND DOCUSATE SODIUM 2 TABLET: 8.6; 5 TABLET, FILM COATED ORAL at 08:28

## 2017-11-17 RX ADMIN — OMEPRAZOLE 20 MG: 20 CAPSULE, DELAYED RELEASE ORAL at 08:28

## 2017-11-17 RX ADMIN — SODIUM CHLORIDE: 9 INJECTION, SOLUTION INTRAVENOUS at 02:17

## 2017-11-17 RX ADMIN — DULOXETINE HYDROCHLORIDE 60 MG: 60 CAPSULE, DELAYED RELEASE ORAL at 08:28

## 2017-11-17 RX ADMIN — ASPIRIN 81 MG: 81 TABLET, CHEWABLE ORAL at 08:27

## 2017-11-17 RX ADMIN — SODIUM CHLORIDE 1000 ML: 9 INJECTION, SOLUTION INTRAVENOUS at 12:46

## 2017-11-17 RX ADMIN — LORAZEPAM 2 MG: 2 INJECTION INTRAMUSCULAR; INTRAVENOUS at 09:46

## 2017-11-17 RX ADMIN — DULOXETINE HYDROCHLORIDE 60 MG: 60 CAPSULE, DELAYED RELEASE ORAL at 22:03

## 2017-11-17 RX ADMIN — DEXTROSE MONOHYDRATE 1000 MG: 50 INJECTION, SOLUTION INTRAVENOUS at 02:54

## 2017-11-17 RX ADMIN — ASPIRIN 81 MG: 81 TABLET, CHEWABLE ORAL at 22:02

## 2017-11-17 RX ADMIN — KETOROLAC TROMETHAMINE 30 MG: 30 INJECTION, SOLUTION INTRAMUSCULAR at 17:12

## 2017-11-17 ASSESSMENT — ENCOUNTER SYMPTOMS
WEIGHT LOSS: 0
ORTHOPNEA: 0
DIZZINESS: 0
BLURRED VISION: 1
BACK PAIN: 0
WEAKNESS: 1
NAUSEA: 1
DEPRESSION: 1
SHORTNESS OF BREATH: 1
SPUTUM PRODUCTION: 0
EYE REDNESS: 0
LOSS OF CONSCIOUSNESS: 1
BLURRED VISION: 0
EYE PAIN: 0
SHORTNESS OF BREATH: 0
MYALGIAS: 1
NERVOUS/ANXIOUS: 0
HEADACHES: 1
SPEECH CHANGE: 0
CONSTIPATION: 0
NAUSEA: 0
DEPRESSION: 0
FEVER: 0
NECK PAIN: 0
DIZZINESS: 1
HEMOPTYSIS: 0
CHILLS: 0
HEARTBURN: 0
NERVOUS/ANXIOUS: 1
LOSS OF CONSCIOUSNESS: 0
FOCAL WEAKNESS: 0
HALLUCINATIONS: 0
ABDOMINAL PAIN: 0
SEIZURES: 1
PALPITATIONS: 0
DIARRHEA: 0
COUGH: 0
VOMITING: 0
BRUISES/BLEEDS EASILY: 0
TREMORS: 1
SEIZURES: 0
DOUBLE VISION: 0
SORE THROAT: 0
SENSORY CHANGE: 0
PHOTOPHOBIA: 0
MYALGIAS: 0

## 2017-11-17 ASSESSMENT — PAIN SCALES - GENERAL
PAINLEVEL_OUTOF10: 10
PAINLEVEL_OUTOF10: 6
PAINLEVEL_OUTOF10: 10

## 2017-11-17 ASSESSMENT — PATIENT HEALTH QUESTIONNAIRE - PHQ9
SUM OF ALL RESPONSES TO PHQ9 QUESTIONS 1 AND 2: 0
SUM OF ALL RESPONSES TO PHQ QUESTIONS 1-9: 0
1. LITTLE INTEREST OR PLEASURE IN DOING THINGS: NOT AT ALL
2. FEELING DOWN, DEPRESSED, IRRITABLE, OR HOPELESS: NOT AT ALL

## 2017-11-17 ASSESSMENT — COPD QUESTIONNAIRES
DURING THE PAST 4 WEEKS HOW MUCH DID YOU FEEL SHORT OF BREATH: NONE/LITTLE OF THE TIME
COPD SCREENING SCORE: 0
DO YOU EVER COUGH UP ANY MUCUS OR PHLEGM?: NO/ONLY WITH OCCASIONAL COLDS OR INFECTIONS
HAVE YOU SMOKED AT LEAST 100 CIGARETTES IN YOUR ENTIRE LIFE: NO/DON'T KNOW

## 2017-11-17 ASSESSMENT — LIFESTYLE VARIABLES
SUBSTANCE_ABUSE: 0
EVER_SMOKED: NEVER
EVER_SMOKED: NEVER
DO YOU DRINK ALCOHOL: NO

## 2017-11-17 NOTE — H&P
Internal Medicine Admitting History and Physical    Note Author: Juan Miguel Rosales M.D.       Name Enedelia Pang     1970   Age/Sex 47 y.o. female   MRN [id number]   Code Status Full code     After 5PM or if no immediate response to page, please call for cross-coverage  Attending/Team: Dr. Judge/Petar See Patient List for primary contact information  Call [phone] to page    1st Call - Day Intern (R1):   Dr. Márquez 2nd Call - Day Sr. Resident (R2/R3):   Dr. Taveras       Chief Complaint:  Shaking movements    HPI:  Ms. Pang is a 47-year-old female with a past medical history of chronic migraine headaches status post occipital nerve stimulator placed in , seronegative rheumatoid arthritis, pseudotumor cerebri status post  shunt who presents today for seizure-like activity. On the evening of , patient experienced approximately 10-12 episodes of shaking, seizure-like activity over a period of 30 minutes. These episodes lasted between 20 seconds and 2 minutes and were all witnessed by her . The  was able to record some of the movements and noted sometimes it was only the patient's hand shaking and sometimes the patient's entire body was jerking in a  tonic-clonic fashion. The patient's  also notes that after the seizure activity had subsided the patient was confused and unable to respond to his questions for a period of time. After the first episode the patients  called EMS and awaited their arrival. The patient states that she has had similar episodes previously however they were limited to her hands and never became generalized. Patient did note an episode of urinary incontinence but did not bite her tongue. Patient had a recent abnormal EEG in 2017 which showed nonspecific cortical dysfunction. She is scheduled to follow-up with Dr. Ribera as an outpatient. She denies any visual disturbances, dizziness, lightheadedness, chest pain, palpitations,  shortness of breath, focal muscle weakness, paresthesia.     In the ER labs were grossly normal including electrolytes. Patient was given Ativan. CT head was ordered and patient was admitted to telemetry.      Review of Systems   Constitutional: Positive for malaise/fatigue. Negative for chills, fever and weight loss.   HENT: Negative for hearing loss, sore throat and tinnitus.    Eyes: Negative for blurred vision, double vision and photophobia.   Respiratory: Negative for cough, hemoptysis and shortness of breath.    Cardiovascular: Negative for chest pain, palpitations and orthopnea.   Gastrointestinal: Negative for abdominal pain, nausea and vomiting.   Genitourinary: Negative for dysuria and urgency.   Musculoskeletal: Positive for myalgias. Negative for back pain and neck pain.   Skin: Negative for rash.   Neurological: Positive for seizures, weakness and headaches. Negative for dizziness, sensory change, speech change and focal weakness.   Endo/Heme/Allergies: Does not bruise/bleed easily.   Psychiatric/Behavioral: Positive for depression. The patient is nervous/anxious.            Past Medical History:   Past Medical History:   Diagnosis Date   • Allergy, unspecified not elsewhere classified    • Anemia 10/05/2017    Unsure if a current issue.   • Anxiety    • autoimmune    • Depression    • H/O Clostridium difficile infection    • Hx MRSA infection    • Hydrocephalus     with lumbar shunt   • Migraine with aura, with intractable migraine, so stated, without mention of status migrainosus    • MRSA (methicillin resistant Staphylococcus aureus)    • Pituitary abnormality (CMS-[facility])    • Staph infection    • Stroke (CMS-[facility])     2007       Past Surgical History:  Past Surgical History:   Procedure Laterality Date   •  SHUNT INSERTION  5/31/2017    Procedure:  SHUNT INSERTION;  Surgeon: Drew Berry M.D.;  Location: SURGERY [facility];  Service:    • SPINAL CORD STIMULATOR  12/19/2016    Procedure:  "SPINAL CORD STIMULATOR FOR: PLACEMENT OF LEFT FLANK OCCIPITAL NERVE STIMULATOR BATTERY PACK;  Surgeon: Oli Oh III, M.D.;  Location: SURGERY [facility];  Service:    • LUMBAR EXPLORATION N/A 8/31/2015    Procedure: LUMBAR EXPLORATION- Lumbar shunt removal ;  Surgeon: Oli Oh III, M.D.;  Location: SURGERY [facility];  Service:    • IRRIGATION & DEBRIDEMENT NEURO N/A 6/28/2015    Procedure: IRRIGATION & DEBRIDEMENT NEURO;  Surgeon: Oli Oh III, M.D.;  Location: SURGERY [facility];  Service:    • APPENDECTOMY     • CHOLECYSTECTOMY     • OTHER      right knee surgery   • OTHER NEUROLOGICAL SURG      occipital nerve stimulator   • TONSILLECTOMY     • TUBAL LIGATION         Current Outpatient Medications:  Home Medications    **Home medications have not yet been reviewed for this encounter**         Medication Allergy/Sensitivities:  Allergies   Allergen Reactions   • Prochlorperazine Swelling     Tongue swelling. Reaction as a teen. (compazine)  Tolerated Phenergan on 02/24/15   • Sumatriptan Unspecified     Historical=\"Heart stops.\" Reaction  between 1995 to 1997   • Vancomycin Shortness of Breath and Rash     Reaction in 2005.  D/W patient 8/31/15 - tolerated loading dose of vancomycin administered on 8/30/15 with some itching to chest with decreased infusion rate. Red Man Syndrome     Family History:  Family History   Problem Relation Age of Onset   • No Known Problems Mother    • No Known Problems Father        Social History:  Social History     Social History   • Marital status:      Spouse name: N/A   • Number of children: N/A   • Years of education: N/A     Occupational History   •       on disability     Social History Main Topics   • Smoking status: Never Smoker   • Smokeless tobacco: Never Used   • Alcohol use Yes      Comment: Rare   • Drug use: No   • Sexual activity: Not on file     Other Topics Concern   • Not on file     Social History Narrative   • " No narrative on file       Physical Exam     Vitals:    11/16/17 2330 11/17/17 0030 11/17/17 0100 11/17/17 0200   BP:    111/78   Pulse: (!) 114 (!) 103 100    Resp: 16   18   Temp:    (!) 23.3 °C (73.9 °F)   TempSrc:       SpO2: 96% 92% 96%    Weight:    73.9 kg (162 lb 14.7 oz)     Body mass index is 28.87 kg/m².  /78   Pulse 100   Temp (!) 23.3 °C (73.9 °F)   Resp 18   Wt 73.9 kg (162 lb 14.7 oz)   LMP 11/14/2017   SpO2 96%   Breastfeeding? No   BMI 28.87 kg/m²   O2 therapy: Pulse Oximetry: 96 %, O2 (LPM): 2, O2 Delivery: Silicone Nasal Cannula      Physical Exam   Constitutional: She is oriented to person, place, and time and well-developed, well-nourished, and in no distress.   HENT:   Head: Normocephalic and atraumatic.   Mouth/Throat: No oropharyngeal exudate.   Eyes: Conjunctivae are normal. Pupils are equal, round, and reactive to light. No scleral icterus.   Neck: Normal range of motion. Neck supple. No thyromegaly present.   Cardiovascular: Normal rate, regular rhythm and normal heart sounds.    No murmur heard.  Pulmonary/Chest: Effort normal and breath sounds normal. No respiratory distress. She has no wheezes.   Abdominal: Soft. Bowel sounds are normal. She exhibits no distension.   Musculoskeletal: Normal range of motion. She exhibits no edema or deformity.   Neurological: She is alert and oriented to person, place, and time. She displays normal reflexes. No cranial nerve deficit.   Skin: Skin is dry. No rash noted. No erythema.   Psychiatric: Affect normal.       Data Review       Old Records Request:   Completed  Current Records review and summary: Completed    Lab Data Review:  Recent Results (from the past 24 hour(s))   CBC WITH DIFFERENTIAL    Collection Time: 11/16/17 11:12 PM   Result Value Ref Range    WBC 6.1 4.8 - 10.8 K/uL    RBC 4.41 4.20 - 5.40 M/uL    Hemoglobin 13.4 12.0 - 16.0 g/dL    Hematocrit 41.9 37.0 - 47.0 %    MCV 95.0 81.4 - 97.8 fL    MCH 30.4 27.0 - 33.0 pg     MCHC 32.0 (L) 33.6 - 35.0 g/dL    RDW 47.5 35.9 - 50.0 fL    Platelet Count 307 164 - 446 K/uL    MPV 9.7 9.0 - 12.9 fL    Neutrophils-Polys 73.20 (H) 44.00 - 72.00 %    Lymphocytes 18.80 (L) 22.00 - 41.00 %    Monocytes 7.20 0.00 - 13.40 %    Eosinophils 0.20 0.00 - 6.90 %    Basophils 0.30 0.00 - 1.80 %    Immature Granulocytes 0.30 0.00 - 0.90 %    Nucleated RBC 0.00 /100 WBC    Neutrophils (Absolute) 4.48 2.00 - 7.15 K/uL    Lymphs (Absolute) 1.15 1.00 - 4.80 K/uL    Monos (Absolute) 0.44 0.00 - 0.85 K/uL    Eos (Absolute) 0.01 0.00 - 0.51 K/uL    Baso (Absolute) 0.02 0.00 - 0.12 K/uL    Immature Granulocytes (abs) 0.02 0.00 - 0.11 K/uL    NRBC (Absolute) 0.00 K/uL   BASIC METABOLIC PANEL    Collection Time: 11/16/17 11:12 PM   Result Value Ref Range    Sodium 139 135 - 145 mmol/L    Potassium 4.0 3.6 - 5.5 mmol/L    Chloride 103 96 - 112 mmol/L    Co2 28 20 - 33 mmol/L    Glucose 108 (H) 65 - 99 mg/dL    Bun 8 8 - 22 mg/dL    Creatinine 0.52 0.50 - 1.40 mg/dL    Calcium 9.0 8.5 - 10.5 mg/dL    Anion Gap 8.0 0.0 - 11.9   ESTIMATED GFR    Collection Time: 11/16/17 11:12 PM   Result Value Ref Range    GFR If African American >60 >60 mL/min/1.73 m 2    GFR If Non African American >60 >60 mL/min/1.73 m 2   TSH (Thyroid Stimulating Hormone)    Collection Time: 11/16/17 11:12 PM   Result Value Ref Range    TSH 1.980 0.300 - 3.700 uIU/mL   Magnesium    Collection Time: 11/16/17 11:12 PM   Result Value Ref Range    Magnesium 1.9 1.5 - 2.5 mg/dL       Imaging/Procedures Review:    Independant Imaging Review: Completed     CT-HEAD W/O    (Results Pending)          Assessment/Plan     * Seizures (CMS-HCC)- (present on admission)   Assessment & Plan    - Patient experienced multiple episodes with seizure-like activity as recorded by   - Based on videos appears to have been focal seizure evolving to a bilateral tonic-clonic seizure   - EEG on 11/2017 showed nonspecific cortical dysfunction, recommended prolonged  outpatient EEG monitoring  - CT head pending  - Patient started on Keppra, will continue Neurotin  - CPK pending, started on IV fluids  - Seizure/Aspiration precautions, Q4H Neuro checks   - Day team to consult Dr. Ribera as patient is scheduled to follow up with him as outpatient already        Chronic migraine- (present on admission)   Assessment & Plan    - Patient has history of chronic, debilitating migraines since age 14  - S/p occipital nerve stimulator placed in 2003  - Continue home meds        Seronegative rheumatoid arthritis (CMS-HCC)- (present on admission)   Assessment & Plan    - Patient has history of seronegative rheumatoid arthritis  - Currently following rheumatology, failed trial of methotrexate due to elevated liver enzymes  - Will continue to monitor        Pseudotumor cerebri- (present on admission)   Assessment & Plan    - Patient has history of pseudotumor cerebri status post  shunt placed approximately 5 months ago  - Currently well-controlled, continue monitor        Wounds, multiple- (present on admission)   Assessment & Plan    - Patient has history of multiple ulcer like wounds on hands and lower legs  - Currently following up with rheumatology and hematology for possible hypercoagulability or autoimmune disease  - We will continue to follow, no signs of infection or indication for a wound care consult at this time          Anticipated Hospital stay: >2 Midnights      Quality Measures    Reviewed items::  Radiology images reviewed, Labs reviewed and Medications reviewed  Lopez catheter::  No Lopez  : no central line.  DVT prophylaxis pharmacological::  Enoxaparin (Lovenox)  DVT prophylaxis - mechanical:  Not indicated at this time, ambulatory  Ulcer Prophylaxis::  No  : no antibiotics.

## 2017-11-17 NOTE — ED NOTES
Pt BIB family with c/c of shaking. Family stating she was laying on the cough and started shaking so he called EMS. Per  pt had a recent abnormal EEG and was told to follow up for a 3 day test. Pt with eye closed in triage moaning. Pt reporting nausea and body pains. Pt tachycardiac. During triage pt with extreme shaking in wheelchair, pt able to talk through the event. Pt tearful.

## 2017-11-17 NOTE — ED NOTES
Pt ambulatory to Red 5. Pt changed into gown and placed on monitor.  Agree with triage note.   Dr Baugh at bedside now for pt evaluation.

## 2017-11-17 NOTE — PROGRESS NOTES
Pain reassessment, pt still c/o H/A pn 10 on 1 to 10 scale. MD aware. Call bell in reach, will continue to monitor.

## 2017-11-17 NOTE — PROGRESS NOTES
Internal Medicine Interval Note  Note Author: Abby Márquez M.D.     Name Enedelia Pang     1970   Age/Sex 47 y.o. female   MRN [id number]   Code Status FULL     After 5PM or if no immediate response to page, please call for cross-coverage  Attending/Team: Dr. Judge / Petar See Patient List for primary contact information  Call [phone] to page    1st Call - Day Intern (R1):   Dr. Márquez 2nd Call - Day Sr. Resident (R2/R3):   Dr. Taveras         Reason for interval visit  (Principal Problem)   Seizures (CMS-[facility])    Interval Problem Daily Status Update  (24 hours)   Pt was seen & examined at bedside. Pt looks very drowsy, tired & weak.  at Bedside on AM rounds.  showed the video recordings of seizure episodes. The nature of seizure hard to determine from those videos. Pt denied SI / HI.   Pt complaints of terrible headache, 9/10 in frontal area. CT showed no acute abnormalities. D/C all medications that can lower seizure threshold. Neurology was consulted. EEG ordered. Per Neuro, continue Keppra but decrease the dose to 500mg bid. Neurology will follow her & will adjust the Kepra dosage. Pt may need to be monitor by EEG.     Review of Systems   Constitutional: Positive for malaise/fatigue. Negative for chills and fever.   HENT: Negative for congestion and sore throat.    Eyes: Positive for blurred vision. Negative for double vision, photophobia, pain and redness.   Respiratory: Positive for shortness of breath. Negative for cough and sputum production.    Cardiovascular: Negative for chest pain and leg swelling.   Gastrointestinal: Positive for nausea. Negative for abdominal pain, constipation, diarrhea, heartburn and vomiting.   Genitourinary: Negative for dysuria.   Musculoskeletal: Negative for myalgias.   Neurological: Positive for dizziness, weakness and headaches. Negative for sensory change, speech change, focal weakness, seizures and loss of consciousness.   Psychiatric/Behavioral:  Negative for depression.       Consultants/Specialty  Neurology    Disposition  Inpatient     Quality Measures    Reviewed items::  Labs reviewed, Medications reviewed, EKG reviewed and Radiology images reviewed  Lopez catheter::  No Lopez  DVT prophylaxis pharmacological::  Enoxaparin (Lovenox)  Antibiotics:  Treating active infection/contamination beyond 24 hours perioperative coverage          Physical Exam       Vitals:    11/17/17 0500 11/17/17 0733 11/17/17 0946 11/17/17 1556   BP: 102/81 107/83     Pulse: 93 86  85   Resp: 18 18 20 20   Temp: 36.5 °C (97.7 °F) 36.2 °C (97.1 °F)     TempSrc:       SpO2: 94% 98%  96%   Weight:       Height:         Body mass index is 28.87 kg/m². Weight: 73.9 kg (162 lb 14.7 oz)  Oxygen Therapy:  Pulse Oximetry: 96 %, O2 (LPM): 2, O2 Delivery: Silicone Nasal Cannula    Physical Exam   Constitutional: No distress.   HENT:   Head: Normocephalic and atraumatic.   Eyes: Conjunctivae are normal. Pupils are equal, round, and reactive to light.   Neck: Normal range of motion. Neck supple.   Cardiovascular: Normal rate, regular rhythm and normal heart sounds.    Pulmonary/Chest: Effort normal and breath sounds normal. No respiratory distress.   Abdominal: Soft. Bowel sounds are normal.   Musculoskeletal: Normal range of motion.   Neurological:   Pt seems very drowsy. Answered questions very slowly.  Motor 5/5. Sensation & CN 1-12 grossly intact. DTRs 2+ symmetrical.   Skin: Skin is warm.   Psychiatric: Affect normal.         Lab Data Review:         11/17/2017  2:05 PM    Recent Labs      11/16/17 2312 11/17/17   0443   SODIUM  139  139   POTASSIUM  4.0  4.1   CHLORIDE  103  104   CO2  28  30   BUN  8  7*   CREATININE  0.52  0.57   MAGNESIUM  1.9   --    CALCIUM  9.0  8.9       Recent Labs      11/16/17 2312 11/17/17 0443   ALTSGPT   --   85*   ASTSGOT   --   84*   ALKPHOSPHAT   --   112*   TBILIRUBIN   --   0.4   GLUCOSE  108*  104*       Recent Labs      11/16/17 2312   RBC   4.41   HEMOGLOBIN  13.4   HEMATOCRIT  41.9   PLATELETCT  307       Recent Labs      11/16/17   2312  11/17/17   0443   WBC  6.1   --    NEUTSPOLYS  73.20*   --    LYMPHOCYTES  18.80*   --    MONOCYTES  7.20   --    EOSINOPHILS  0.20   --    BASOPHILS  0.30   --    ASTSGOT   --   84*   ALTSGPT   --   85*   ALKPHOSPHAT   --   112*   TBILIRUBIN   --   0.4           Assessment/Plan     * Seizures (CMS-[facility])- (present on admission)   Assessment & Plan    - Patient experienced multiple episodes with seizure-like activity as recorded by   - Based on videos appears to have been focal seizure evolving to a bilateral tonic-clonic seizure   - EEG on 11/2017 showed nonspecific cortical dysfunction, recommended prolonged outpatient EEG monitoring  - CT head showed no acute abnormalities  - Patient started on Keppra, will continue Neurotin  - CPK : WNL, started on IV fluids  - Seizure/Aspiration precautions, Q4H Neuro checks   - Neurology was consulted. EEG ordered. Per Neuro, continue Keppra but decrease the dose to 500mg bid        Chronic migraine- (present on admission)   Assessment & Plan    - Patient has history of chronic, debilitating migraines since age 14  - S/p occipital nerve stimulator placed in 2003  - Continue home meds        Seronegative rheumatoid arthritis (CMS-[facility])- (present on admission)   Assessment & Plan    - Patient has history of seronegative rheumatoid arthritis  - Currently following rheumatology, failed trial of methotrexate due to elevated liver enzymes  - denied joint pain for this admission  - Will continue to monitor        Pseudotumor cerebri- (present on admission)   Assessment & Plan    - Patient has history of pseudotumor cerebri status post  shunt placed approximately 5 months ago  - Currently well-controlled, continue monitor        Wounds, multiple- (present on admission)   Assessment & Plan    - Patient has history of multiple ulcer like wounds on hands and lower legs  - Currently  following up with rheumatology and hematology for possible hypercoagulability or autoimmune disease  - We will continue to follow, no signs of infection or indication for a wound care consult at this time

## 2017-11-17 NOTE — SENIOR ADMIT NOTE
Ms. Pang 47-year-old female with a past medical history of chronic migraine headaches status post occipital nerve stimulator placed in 2003, seronegative rheumatoid arthritis, pseudotumor cerebri s/p R   shunt.    By her , she had intermittent tremors of her hands when they came in one week ago to ER (initially went to [facility] and was later transferred to [facility]). She was later discharged from the ER stating that she didn't have any abnormal signs.   The next issue followed with neurology nurse practitioner, is told that the EEG is abnormal however was not started on any treatment and she was to reestablish with Dr. Ribera. The evening before admission her  noticed shaking of her body 12 times with duration ranging from 30 seconds to 3 minutes. He showed me and intern Dr. Rosales the videos of seizures. Generalized shaking of her whole body is seen suggesting tonic-clonic seizures. She had postictal confusion ranging from 14 seconds - 10 minutes based on the duration of seizures. She did not bite her tongue however had involuntary urination ×2.     She has complex medical history of complex migraines status post no stimulantor, right-sided  shunt for pseudotumor cerebri placed a few months ago. She follows with neurosurgeon. She also has spontaneous ulcers. One of the ulcer in right leg was positive for MRSA 3 weeks ago for which she is on antibiotics.    Exam:  /78   Pulse 100   Temp (!) 23.3 °C (73.9 °F)   Resp 18   Wt 73.9 kg (162 lb 14.7 oz)   LMP 11/14/2017   SpO2 96%   Breastfeeding? No   BMI 28.87 kg/m²     Physical exam:   General: She looks tired however AAO ×4  HEENT: NC/AT. PERRLA, EOMI. moist mucous membranes. No lymphadenopathy.  CVS: Tachycardia, regular, S1S2 WNL, no MRG  RS: CTA, good air entry. No wheezes or ronchi.  Abdomen: Soft, NT/ND, BS +. No organomegaly  Ext: No pedal edema  MSK: Stiffness of phalangeal joints.   Skin: Scabbed ulcers in the right  extensor surface of hand and right leg.  1×1 cm clean-based ulcer in the left hand extensor surface.  Neuro: CN 2-12 wnl. Motor and sensory exam wnl.      Labs:  Recent Labs      11/16/17 2312   SODIUM  139   POTASSIUM  4.0   CHLORIDE  103   CO2  28   BUN  8   CREATININE  0.52   MAGNESIUM  1.9   CALCIUM  9.0       Recent Labs      11/16/17 2312   GLUCOSE  108*       Recent Labs      11/16/17 2312   RBC  4.41   HEMOGLOBIN  13.4   HEMATOCRIT  41.9   PLATELETCT  307       Recent Labs      11/16/17 2312   WBC  6.1   NEUTSPOLYS  73.20*   LYMPHOCYTES  18.80*   MONOCYTES  7.20   EOSINOPHILS  0.20   BASOPHILS  0.30         CT-HEAD W/O    (Results Pending)       Assessment and Plan:  # Tonic-clonic seizures  - New onset one week ago. EEG was abnormal per neurology nurse practitioner weekly ago. She is yet to see Dr. Ribera.  - Seizure, aspiration, fall precautions.  - Started Keppra 1000 MG every 12 hours.  - Neurology consult in the a.m. she may need new EEG.  - CT was ordered initially but if not done already MRI of brain with and without contrast is preferred.  - Ordered CPK    For further details , please refer to H&P by Dr. Rosales.

## 2017-11-17 NOTE — ASSESSMENT & PLAN NOTE
- Patient has history of multiple ulcer like wounds on hands and lower legs  - Currently following up with rheumatology and hematology for possible hypercoagulability or autoimmune disease  - We will continue to follow, no signs of infection or indication for a wound care consult at this time

## 2017-11-17 NOTE — DIETARY
"Nutrition Services: Consult for Poor PO on Admit Screen    Past Medical History:   Diagnosis Date   • Allergy, unspecified not elsewhere classified    • Anemia 10/05/2017    Unsure if a current issue.   • Anxiety    • autoimmune    • Depression    • H/O Clostridium difficile infection    • Hx MRSA infection    • Hydrocephalus     with lumbar shunt   • Migraine with aura, with intractable migraine, so stated, without mention of status migrainosus    • MRSA (methicillin resistant Staphylococcus aureus)    • Pituitary abnormality (CMS-HCC)    • Staph infection    • Stroke (CMS-HCC)     2007     Ht: 63\"  Wt: 73.9 kg(162#)   BMI: 28.9 (Overweight Classification)  Pertinent Labs: Reviewed  Pertinent Meds: Motrin, Neurontin, Thereagran, Prilosec, Senekot, Thiamine.  Fluids:  NS @ 125 mL/hr   Skin: Skin tear to hand.   GI:Last BM PTA  Diet: NPO day 1    48 y/o Female admitted w/ seizures, attempted to see today at bedside to assess weight history and nutrition status r/t reported poor PO intake. On attempt to see, pt was crying and refused to speak/ answer questions.     PLAN/RECOMMEND -   1) [facility]iet as tolerated.  2)Nutrition rep to see patient daily for meal and snack preferences.  3) Encourage PO  4) Weekly weights to monitor fluid and nutrition status  5) Obtain supplement order per RD as needed.    RD following  "

## 2017-11-17 NOTE — CONSULTS
[facility] [facility]     Name Enedelia Pang     1970   Age/Sex 47 y.o. female   MRN [id number]       Chief Complaint:  Seizure    HPI:  47 year old female with complicated history of migraine, pseudotumor cerebri treated with shunt and complicated with infection many times and she has an occipital nerve stimulator in  and hx of autoimmune disease?? came with  Shaking and LOC.  The patient was seen by Dr. Marks who ordered EEG for strange sensation when she sees flashing light and EEG was nonspecific, but she has never been diagnosed with seizure or never been on meds for that, on the day of admission the patient had shaking on all her legs and arms for couple minutes with LOC and not able to communicate with others and she urinated on her self later she had confusion and she was very weak, the patient has never had like this episodes before.         Review of Systems   Constitutional: Positive for malaise/fatigue. Negative for chills and fever.   HENT: Negative for hearing loss and tinnitus.    Eyes: Positive for blurred vision.   Respiratory: Negative for cough.    Cardiovascular: Negative for chest pain and palpitations.   Gastrointestinal: Negative for heartburn.   Genitourinary: Negative for dysuria.   Musculoskeletal: Negative for myalgias.   Skin: Positive for rash.   Neurological: Positive for tremors, seizures, loss of consciousness and weakness.   Psychiatric/Behavioral: Negative for depression, hallucinations and substance abuse. The patient is not nervous/anxious.              Past Medical History:   Past Medical History:   Diagnosis Date   • Allergy, unspecified not elsewhere classified    • Anemia 10/05/2017    Unsure if a current issue.   • Anxiety    • autoimmune    • Depression    • H/O Clostridium difficile infection    • Hx MRSA infection    • Hydrocephalus     with lumbar shunt   • Migraine with aura, with  "intractable migraine, so stated, without mention of status migrainosus    • MRSA (methicillin resistant Staphylococcus aureus)    • Pituitary abnormality (CMS-HCC)    • Staph infection    • Stroke (CMS-HCC)     2007       Past Surgical History:  Past Surgical History:   Procedure Laterality Date   •  SHUNT INSERTION  5/31/2017    Procedure:  SHUNT INSERTION;  Surgeon: Drew Berry M.D.;  Location: SURGERY [facility];  Service:    • SPINAL CORD STIMULATOR  12/19/2016    Procedure: SPINAL CORD STIMULATOR FOR: PLACEMENT OF LEFT FLANK OCCIPITAL NERVE STIMULATOR BATTERY PACK;  Surgeon: Oli Oh III, M.D.;  Location: SURGERY [facility];  Service:    • LUMBAR EXPLORATION N/A 8/31/2015    Procedure: LUMBAR EXPLORATION- Lumbar shunt removal ;  Surgeon: Oli Oh III, M.D.;  Location: SURGERY [facility];  Service:    • IRRIGATION & DEBRIDEMENT NEURO N/A 6/28/2015    Procedure: IRRIGATION & DEBRIDEMENT NEURO;  Surgeon: Oli Oh III, M.D.;  Location: SURGERY [facility];  Service:    • APPENDECTOMY     • CHOLECYSTECTOMY     • OTHER      right knee surgery   • OTHER NEUROLOGICAL SURG      occipital nerve stimulator   • TONSILLECTOMY     • TUBAL LIGATION         Current Outpatient Medications:  Home Medications    **Home medications have not yet been reviewed for this encounter**         Medication Allergy/Sensitivities:  Allergies   Allergen Reactions   • Prochlorperazine Swelling     Tongue swelling. Reaction as a teen. (compazine)  Tolerated Phenergan on 02/24/15   • Sumatriptan Unspecified     Historical=\"Heart stops.\" Reaction  between 1995 to 1997   • Vancomycin Shortness of Breath and Rash     Reaction in 2005.  D/W patient 8/31/15 - tolerated loading dose of vancomycin administered on 8/30/15 with some itching to chest with decreased infusion rate. Red Man Syndrome         Family History:  Family History   Problem Relation Age of Onset   • No Known Problems Mother    • No Known " "Problems Father        Social History:  Social History     Social History   • Marital status:      Spouse name: N/A   • Number of children: N/A   • Years of education: N/A     Occupational History   •       on disability     Social History Main Topics   • Smoking status: Never Smoker   • Smokeless tobacco: Never Used   • Alcohol use Yes      Comment: Rare   • Drug use: No   • Sexual activity: Not on file     Other Topics Concern   • Not on file     Social History Narrative   • No narrative on file         Physical Exam     Vitals:    11/17/17 0200 11/17/17 0500 11/17/17 0733 11/17/17 0946   BP: 111/78 102/81 107/83    Pulse:  93 86    Resp: 18 18 18 20   Temp: (!) 23.3 °C (73.9 °F) 36.5 °C (97.7 °F) 36.2 °C (97.1 °F)    TempSrc:       SpO2:  94% 98%    Weight: 73.9 kg (162 lb 14.7 oz)      Height: 1.6 m (5' 2.99\")        Body mass index is 28.87 kg/m².  /83   Pulse 86   Temp 36.2 °C (97.1 °F)   Resp 20   Ht 1.6 m (5' 2.99\")   Wt 73.9 kg (162 lb 14.7 oz)   LMP 11/14/2017   SpO2 98%   Breastfeeding? No   BMI 28.87 kg/m²   O2 therapy: Pulse Oximetry: 98 %, O2 (LPM): 2, O2 Delivery: Silicone Nasal Cannula    Physical Exam   Constitutional: She is oriented to person, place, and time. No distress.   Alert and oriented, following commands, not on acute distress.    Cardiovascular: Normal rate.    No murmur heard.  Pulmonary/Chest: No respiratory distress. She has no wheezes.   Abdominal: She exhibits no distension. There is no tenderness.   Musculoskeletal: She exhibits no edema.   Neurological: She is alert and oriented to person, place, and time. She has normal sensation, normal strength, normal reflexes and intact cranial nerves. She displays no Babinski's sign on the right side. She displays no Babinski's sign on the left side.   Reflex Scores:       Tricep reflexes are 2+ on the right side and 2+ on the left side.       Bicep reflexes are 2+ on the right side and 2+ on the left " side.       Brachioradialis reflexes are 2+ on the right side and 2+ on the left side.       Patellar reflexes are 2+ on the right side and 2+ on the left side.       Achilles reflexes are 2+ on the right side and 2+ on the left side.            Data Review       Old Records Request:   Completed  Current Records review and summary: Completed    Lab Data Review:  Recent Results (from the past 24 hour(s))   CBC WITH DIFFERENTIAL    Collection Time: 11/16/17 11:12 PM   Result Value Ref Range    WBC 6.1 4.8 - 10.8 K/uL    RBC 4.41 4.20 - 5.40 M/uL    Hemoglobin 13.4 12.0 - 16.0 g/dL    Hematocrit 41.9 37.0 - 47.0 %    MCV 95.0 81.4 - 97.8 fL    MCH 30.4 27.0 - 33.0 pg    MCHC 32.0 (L) 33.6 - 35.0 g/dL    RDW 47.5 35.9 - 50.0 fL    Platelet Count 307 164 - 446 K/uL    MPV 9.7 9.0 - 12.9 fL    Neutrophils-Polys 73.20 (H) 44.00 - 72.00 %    Lymphocytes 18.80 (L) 22.00 - 41.00 %    Monocytes 7.20 0.00 - 13.40 %    Eosinophils 0.20 0.00 - 6.90 %    Basophils 0.30 0.00 - 1.80 %    Immature Granulocytes 0.30 0.00 - 0.90 %    Nucleated RBC 0.00 /100 WBC    Neutrophils (Absolute) 4.48 2.00 - 7.15 K/uL    Lymphs (Absolute) 1.15 1.00 - 4.80 K/uL    Monos (Absolute) 0.44 0.00 - 0.85 K/uL    Eos (Absolute) 0.01 0.00 - 0.51 K/uL    Baso (Absolute) 0.02 0.00 - 0.12 K/uL    Immature Granulocytes (abs) 0.02 0.00 - 0.11 K/uL    NRBC (Absolute) 0.00 K/uL   BASIC METABOLIC PANEL    Collection Time: 11/16/17 11:12 PM   Result Value Ref Range    Sodium 139 135 - 145 mmol/L    Potassium 4.0 3.6 - 5.5 mmol/L    Chloride 103 96 - 112 mmol/L    Co2 28 20 - 33 mmol/L    Glucose 108 (H) 65 - 99 mg/dL    Bun 8 8 - 22 mg/dL    Creatinine 0.52 0.50 - 1.40 mg/dL    Calcium 9.0 8.5 - 10.5 mg/dL    Anion Gap 8.0 0.0 - 11.9   ESTIMATED GFR    Collection Time: 11/16/17 11:12 PM   Result Value Ref Range    GFR If African American >60 >60 mL/min/1.73 m 2    GFR If Non African American >60 >60 mL/min/1.73 m 2   TSH (Thyroid Stimulating Hormone)    Collection  Time: 17 11:12 PM   Result Value Ref Range    TSH 1.980 0.300 - 3.700 uIU/mL   Hemoglobin A1c    Collection Time: 17 11:12 PM   Result Value Ref Range    Glycohemoglobin 5.0 0.0 - 5.6 %    Est Avg Glucose 97 mg/dL   Magnesium    Collection Time: 17 11:12 PM   Result Value Ref Range    Magnesium 1.9 1.5 - 2.5 mg/dL   CREATINE KINASE    Collection Time: 17  4:43 AM   Result Value Ref Range    CPK Total 32 0 - 154 U/L   Comp Metabolic Panel (CMP)    Collection Time: 17  4:43 AM   Result Value Ref Range    Sodium 139 135 - 145 mmol/L    Potassium 4.1 3.6 - 5.5 mmol/L    Chloride 104 96 - 112 mmol/L    Co2 30 20 - 33 mmol/L    Anion Gap 5.0 0.0 - 11.9    Glucose 104 (H) 65 - 99 mg/dL    Bun 7 (L) 8 - 22 mg/dL    Creatinine 0.57 0.50 - 1.40 mg/dL    Calcium 8.9 8.5 - 10.5 mg/dL    AST(SGOT) 84 (H) 12 - 45 U/L    ALT(SGPT) 85 (H) 2 - 50 U/L    Alkaline Phosphatase 112 (H) 30 - 99 U/L    Total Bilirubin 0.4 0.1 - 1.5 mg/dL    Albumin 3.9 3.2 - 4.9 g/dL    Total Protein 6.5 6.0 - 8.2 g/dL    Globulin 2.6 1.9 - 3.5 g/dL    A-G Ratio 1.5 g/dL   ESTIMATED GFR    Collection Time: 17  4:43 AM   Result Value Ref Range    GFR If African American >60 >60 mL/min/1.73 m 2    GFR If Non African American >60 >60 mL/min/1.73 m 2   EKG    Collection Time: 17  6:31 AM   Result Value Ref Range    Report       [facility]    Test Date:  2017  Pt Name:    BRIGID DAWSON                 Department: 171  MRN:        [id number]                      Room:       [facility]  Gender:     F                            Technician: [facility]  :        1970                   Requested By:EDIN FOLEY  Order #:    [id number]                    Reading MD:    Measurements  Intervals                                Axis  Rate:       85                           P:          62  MI:         148                          QRS:        -14  QRSD:       96                           T:          15  QT:          404  QTc:        481    Interpretive Statements  SINUS RHYTHM  Compared to ECG 10/14/2017 22:05:15  T-wave abnormality no longer present         Imaging/Procedures Review:    ndependant Imaging Review: Completed  CT-HEAD W/O   Final Result      Stable head CT findings with no evidence of acute cerebral infarction, hemorrhage or mass lesion.                      Assessment/Plan       47 year old female with pseudotumor cerebri came with seizure activities:    # Seizure:  - LOC, tonic clonic episode with  postictal state   - no hx of seizure.  - no fever N/V or infection.  - Electrolytes were normal.   - The patient has many risk for seizure and that episode likely seizure.  - We need to repeat EEG and reassess her brain with new CT.  - Started on Keppra since admission>>>continue monitoring her symptoms and adjust the dose if needed.   - Follow up and  Long EEG if she has recurrent seizures.       Thank You for this consultation.            Quality Measures  Quality-Core Measures

## 2017-11-17 NOTE — PROGRESS NOTES
2 RN skin check completed. Patient has generalized bruising to bilateral upper extremities; left hand with open scab/skin tear (LDA opened); right subclavian port a cath noted - dressing is clean, dry, and intact; right shin with scratches and scabs; old scar to left lower back. Otherwise all other areas are clean, dry, and intact.

## 2017-11-17 NOTE — CARE PLAN
Problem: Knowledge Deficit  Goal: Knowledge of disease process/condition, treatment plan, diagnostic tests, and medications will improve    Intervention: Explain information regarding disease process/condition, treatment plan, diagnostic tests, and medications and document in education  Explained information regarding orientation to floor, use of call light and phone, fall prevention, plan of care, tests, and medications. All questions answered.

## 2017-11-17 NOTE — ED PROVIDER NOTES
ED Provider Note    Scribed for Dillan Baugh M.D. by Quin Marshall. 11/16/2017  9:58 PM    Primary care provider: Elliott Power M.D.  Means of arrival: EMS  History obtained from: Patient/  History limited by: None    CHIEF COMPLAINT  Chief Complaint   Patient presents with   • Shaking   • Pain       HPI  Enedelia Pang is a 47 y.o. female who presents to the Emergency Department by EMS for episodes of shaking that have occurred over the last two weeks. Patient did not bite her tongue. She urinated herself twice. She was seen by a Neurologist one week ago and was told her EEG was abnormal and showed some damage from seizure activity. Patient takes Neurontin for Migraines but has not been placed on medication for seizures. Patient had a port placed 6 days ago. She has a nerve stimulate in place on her brain for chronic migraines.    REVIEW OF SYSTEMS  Pertinent positives include shaking, diffuse pain. Pertinent negatives include no vomiting, shortness of breath, chest pain.  All other systems reviewed and negative.  C.    PAST MEDICAL HISTORY   has a past medical history of Allergy, unspecified not elsewhere classified; Anemia (10/05/2017); Anxiety; autoimmune; Depression; H/O Clostridium difficile infection; MRSA infection; Hydrocephalus; Migraine with aura, with intractable migraine, so stated, without mention of status migrainosus; MRSA (methicillin resistant Staphylococcus aureus); Pituitary abnormality (CMS-HCC); Staph infection; and Stroke (CMS-HCC).    SURGICAL HISTORY   has a past surgical history that includes cholecystectomy; tonsillectomy; appendectomy; tubal ligation; other; other neurological surg; irrigation & debridement neuro (N/A, 6/28/2015); lumbar exploration (N/A, 8/31/2015); spinal cord stimulator (12/19/2016); and  shunt insertion (5/31/2017).    SOCIAL HISTORY  Social History   Substance Use Topics   • Smoking status: Never Smoker   • Smokeless tobacco: Never Used   •  Alcohol use Yes      Comment: Rare      History   Drug Use No       FAMILY HISTORY  Family History   Problem Relation Age of Onset   • No Known Problems Mother    • No Known Problems Father        CURRENT MEDICATIONS  No current facility-administered medications on file prior to encounter.      Current Outpatient Prescriptions on File Prior to Encounter   Medication Sig Dispense Refill   • dicyclomine (BENTYL) 10 MG Cap      • mupirocin (BACTROBAN) 2 % Ointment      • clindamycin (CLEOCIN) 300 MG Cap Take 1 Cap by mouth 3 times a day. 21 Cap 0   • doxycycline (VIBRAMYCIN) 100 MG Tab Take 1 Tab by mouth 2 times a day. 60 Tab 0   • predniSONE (DELTASONE) 10 MG Tab Take 1 Tab by mouth every day. Take 40mg x 5 days, 30mg x 5 days, 20mg x 5 days, 10mg x 5 days and 5 mg x 5 days., (Patient not taking: Reported on 11/8/2017) 53 Tab 0   • aspirin (ASA) 81 MG Chew Tab chewable tablet Take 81 mg by mouth 2 Times a Day.     • multivitamin (THERAGRAN) Tab Take 1 Tab by mouth every day.     • risperidone (RISPERDAL) 0.5 MG Tab Take 1 mg by mouth 2 times a day.     • ketorolac (TORADOL) 60 MG/2ML Solution 30 mg by Intramuscular route every 6 hours as needed (for migraine with benadryl). Not more than 2 days per week.     • diphenhydrAMINE (BENADRYL) 50 MG/ML Solution 1 mL by Intramuscular route as needed. 25 Syringe 11   • ondansetron (ZOFRAN ODT) 4 MG TABLET DISPERSIBLE Take 1 Tab by mouth every 8 hours as needed for Nausea/Vomiting. 30 Tab 11   • omeprazole (PRILOSEC) 20 MG delayed-release capsule Take 1 Cap by mouth every day. 30 Cap 2   • gabapentin (NEURONTIN) 600 MG tablet Take 600 mg by mouth 3 times a day.     • oxycodone immediate release (ROXICODONE) 10 MG immediate release tablet Take 1 Tab by mouth every four hours as needed for Moderate Pain. 100 Tab 0   • duloxetine (CYMBALTA) 60 MG CPEP delayed-release capsule Take 60 mg by mouth 2 times a day.         ALLERGIES  Allergies   Allergen Reactions   • Prochlorperazine  "Swelling     Tongue swelling. Reaction as a teen. (compazine)  Tolerated Phenergan on 02/24/15   • Sumatriptan Unspecified     Historical=\"Heart stops.\" Reaction  between 1995 to 1997   • Vancomycin Shortness of Breath and Rash     Reaction in 2005.  D/W patient 8/31/15 - tolerated loading dose of vancomycin administered on 8/30/15 with some itching to chest with decreased infusion rate. Red Man Syndrome       PHYSICAL EXAM  VITAL SIGNS: /77   Pulse (!) 129   Temp 36.6 °C (97.9 °F) (Temporal)   Resp 18   Wt 76.2 kg (168 lb)   SpO2 94%   BMI 29.77 kg/m²     Vital signs reviewed.  Constitutional:  Uncomfortable appearing  Head: Normocephalic, atraumatic   Mouth/Throat: Oropharynx is moist.   Neck: Supple, no JVD.   Cardiovascular: Tachycardic rate and regular rhythm   Pulmonary/Chest: Lungs are clear to auscultation   Abdominal: Soft, non-tender  Musculoskeletal: No pitting edema. Normal ROM.  Lymphadenopathy: No lymphadenopathy   Neurological: Normal strength and sensation. Reflexes intact  Skin: multiple contusions and busing over bilateral forearms. No petechiae or purpura.   Psychiatric: Anxious affect.     LABS  Results for orders placed or performed during the hospital encounter of 11/16/17   CBC WITH DIFFERENTIAL   Result Value Ref Range    WBC 6.1 4.8 - 10.8 K/uL    RBC 4.41 4.20 - 5.40 M/uL    Hemoglobin 13.4 12.0 - 16.0 g/dL    Hematocrit 41.9 37.0 - 47.0 %    MCV 95.0 81.4 - 97.8 fL    MCH 30.4 27.0 - 33.0 pg    MCHC 32.0 (L) 33.6 - 35.0 g/dL    RDW 47.5 35.9 - 50.0 fL    Platelet Count 307 164 - 446 K/uL    MPV 9.7 9.0 - 12.9 fL    Neutrophils-Polys 73.20 (H) 44.00 - 72.00 %    Lymphocytes 18.80 (L) 22.00 - 41.00 %    Monocytes 7.20 0.00 - 13.40 %    Eosinophils 0.20 0.00 - 6.90 %    Basophils 0.30 0.00 - 1.80 %    Immature Granulocytes 0.30 0.00 - 0.90 %    Nucleated RBC 0.00 /100 WBC    Neutrophils (Absolute) 4.48 2.00 - 7.15 K/uL    Lymphs (Absolute) 1.15 1.00 - 4.80 K/uL    Monos (Absolute) " 0.44 0.00 - 0.85 K/uL    Eos (Absolute) 0.01 0.00 - 0.51 K/uL    Baso (Absolute) 0.02 0.00 - 0.12 K/uL    Immature Granulocytes (abs) 0.02 0.00 - 0.11 K/uL    NRBC (Absolute) 0.00 K/uL   BASIC METABOLIC PANEL   Result Value Ref Range    Sodium 139 135 - 145 mmol/L    Potassium 4.0 3.6 - 5.5 mmol/L    Chloride 103 96 - 112 mmol/L    Co2 28 20 - 33 mmol/L    Glucose 108 (H) 65 - 99 mg/dL    Bun 8 8 - 22 mg/dL    Creatinine 0.52 0.50 - 1.40 mg/dL    Calcium 9.0 8.5 - 10.5 mg/dL    Anion Gap 8.0 0.0 - 11.9   ESTIMATED GFR   Result Value Ref Range    GFR If African American >60 >60 mL/min/1.73 m 2    GFR If Non African American >60 >60 mL/min/1.73 m 2       All labs reviewed by me.    COURSE & MEDICAL DECISION MAKING  Pertinent Labs & Imaging studies reviewed. (See chart for details) The patient's [facility] and past medical  records were reviewed    9:58 PM - Patient seen and examined at bedside. Patient will be treated with 1mg Ativan. Ordered CBC with differential, BMP to evaluate her symptoms. The differential diagnoses include but are not limited to: Recurrent seizures    Patient be admitted by the [place]. She'll require further neurology consultation    FINAL IMPRESSION  1. Seizures (CMS-[facility])          Quin MADERA (Scribe), am scribing for, and in the presence of, Dillan Baugh M.D..    Electronically signed by: Quin Marshall (Rob), 11/16/2017    Dillan MADERA M.D. personally performed the services described in this documentation, as scribed by Quin Marshall in my presence, and it is both accurate and complete.    The note accurately reflects work and decisions made by me.  Dillan Baugh  11/16/2017  11:53 PM

## 2017-11-17 NOTE — ASSESSMENT & PLAN NOTE
- Patient experienced multiple episodes with seizure-like activity as recorded by   - Based on videos appears to have been focal seizure evolving to a bilateral tonic-clonic seizure   - EEG on 11/2017 showed nonspecific cortical dysfunction, recommended prolonged outpatient EEG monitoring  - CT head showed no acute abnormalities  - Patient started on Keppra, will continue Neurotin  - CPK : WNL, started on IV fluids  - Seizure/Aspiration precautions, Q4H Neuro checks   - Neurology on board. EEG on 11/17/2017 showed '''An abnormal video EEG recording in the awake, drowsy, and sleep state. Mild to moderate diffuse cerebral dysfunction, suggestive of an encephalopathic state.''  -Per Neuro, continue Keppra   -24 hr EEG done on Monday 11/20/2017 showed no significant abnormalities  -Started Depakote ER & Benadryl on 11/21/2017.

## 2017-11-17 NOTE — ASSESSMENT & PLAN NOTE
- Patient has history of pseudotumor cerebri status post  shunt placed approximately 5 months ago  - Currently well-controlled, continue monitor  - considering acetazolamide if it is getting worse

## 2017-11-17 NOTE — PROGRESS NOTES
Pt pulling at lines and tele leads, crying and kicking feet in bed. 2mg IV ativan given. Will continue to monitor.

## 2017-11-17 NOTE — CARE PLAN
Problem: Pain Management  Goal: Pain level will decrease to patient's comfort goal    Intervention: Follow pain managment plan developed in collaboration with patient and Interdisciplinary Team  Pain scale reviewed. Discussed pharmacological means for pain control: pain medications available, side effects, fall prevention, dosages, frequency; non-pharmacological means also discussed: rest, relaxation, darkened room, distraction, and cold application. All questions answered.

## 2017-11-17 NOTE — ASSESSMENT & PLAN NOTE
- Patient has history of chronic, debilitating migraines since age 14  - S/p occipital nerve stimulator placed in 2003  - Continue home medications  -Started Depakote ER & Benadryl on 11/21/2017.

## 2017-11-17 NOTE — ASSESSMENT & PLAN NOTE
- Patient has history of seronegative rheumatoid arthritis  - Currently following rheumatology, failed trial of methotrexate due to elevated liver enzymes  - denied joint pain for this admission  -Unlikely flare up of her sero neagative RA ( ESR 19, C reactive protein 0.27, ferritin 21).  -continue home Prednisone  -  ordered TPO AB, SSA , SSB antibodoes. Will follow those results.   - Will continue to monitor

## 2017-11-17 NOTE — PROGRESS NOTES
Pt states anxiety is better, no longer pulling on tele box or crying and kicking call bell in reach. Will continue to monitor.

## 2017-11-17 NOTE — PROGRESS NOTES
Pt c/o H/A, 10 on 1 to 10 scale, 30mg IM toradol given. Call bell in reach will continue to monitor.

## 2017-11-18 LAB
ALBUMIN SERPL BCP-MCNC: 3.3 G/DL (ref 3.2–4.9)
ALBUMIN/GLOB SERPL: 1.4 G/DL
ALP SERPL-CCNC: 93 U/L (ref 30–99)
ALT SERPL-CCNC: 46 U/L (ref 2–50)
AMPHET UR QL SCN: NEGATIVE
ANION GAP SERPL CALC-SCNC: 5 MMOL/L (ref 0–11.9)
AST SERPL-CCNC: 28 U/L (ref 12–45)
BARBITURATES UR QL SCN: NEGATIVE
BASOPHILS # BLD AUTO: 0.9 % (ref 0–1.8)
BASOPHILS # BLD: 0.04 K/UL (ref 0–0.12)
BENZODIAZ UR QL SCN: NEGATIVE
BILIRUB SERPL-MCNC: 0.4 MG/DL (ref 0.1–1.5)
BUN SERPL-MCNC: 7 MG/DL (ref 8–22)
BZE UR QL SCN: NEGATIVE
CALCIUM SERPL-MCNC: 8.7 MG/DL (ref 8.5–10.5)
CANNABINOIDS UR QL SCN: NEGATIVE
CHLORIDE SERPL-SCNC: 107 MMOL/L (ref 96–112)
CO2 SERPL-SCNC: 28 MMOL/L (ref 20–33)
CORTIS SERPL-MCNC: 3.9 UG/DL (ref 0–23)
CREAT SERPL-MCNC: 0.53 MG/DL (ref 0.5–1.4)
CRP SERPL HS-MCNC: 0.27 MG/DL (ref 0–0.75)
EOSINOPHIL # BLD AUTO: 0.04 K/UL (ref 0–0.51)
EOSINOPHIL NFR BLD: 0.9 % (ref 0–6.9)
ERYTHROCYTE [DISTWIDTH] IN BLOOD BY AUTOMATED COUNT: 47.8 FL (ref 35.9–50)
ERYTHROCYTE [SEDIMENTATION RATE] IN BLOOD BY WESTERGREN METHOD: 19 MM/HOUR (ref 0–20)
FERRITIN SERPL-MCNC: 21 NG/ML (ref 10–291)
GFR SERPL CREATININE-BSD FRML MDRD: >60 ML/MIN/1.73 M 2
GLOBULIN SER CALC-MCNC: 2.3 G/DL (ref 1.9–3.5)
GLUCOSE SERPL-MCNC: 86 MG/DL (ref 65–99)
HCT VFR BLD AUTO: 36.9 % (ref 37–47)
HGB BLD-MCNC: 11.7 G/DL (ref 12–16)
IMM GRANULOCYTES # BLD AUTO: 0.02 K/UL (ref 0–0.11)
IMM GRANULOCYTES NFR BLD AUTO: 0.5 % (ref 0–0.9)
INR PPP: 1.12 (ref 0.87–1.13)
LYMPHOCYTES # BLD AUTO: 1.07 K/UL (ref 1–4.8)
LYMPHOCYTES NFR BLD: 24.5 % (ref 22–41)
MAGNESIUM SERPL-MCNC: 1.7 MG/DL (ref 1.5–2.5)
MCH RBC QN AUTO: 31 PG (ref 27–33)
MCHC RBC AUTO-ENTMCNC: 31.7 G/DL (ref 33.6–35)
MCV RBC AUTO: 97.6 FL (ref 81.4–97.8)
METHADONE UR QL SCN: NEGATIVE
MONOCYTES # BLD AUTO: 0.3 K/UL (ref 0–0.85)
MONOCYTES NFR BLD AUTO: 6.9 % (ref 0–13.4)
NEUTROPHILS # BLD AUTO: 2.9 K/UL (ref 2–7.15)
NEUTROPHILS NFR BLD: 66.3 % (ref 44–72)
NRBC # BLD AUTO: 0 K/UL
NRBC BLD AUTO-RTO: 0 /100 WBC
OPIATES UR QL SCN: NEGATIVE
OXYCODONE UR QL SCN: NEGATIVE
PCP UR QL SCN: NEGATIVE
PHOSPHATE SERPL-MCNC: 4.1 MG/DL (ref 2.5–4.5)
PLATELET # BLD AUTO: 262 K/UL (ref 164–446)
PMV BLD AUTO: 9.8 FL (ref 9–12.9)
POTASSIUM SERPL-SCNC: 4 MMOL/L (ref 3.6–5.5)
PROLACTIN SERPL-MCNC: 63.18 NG/ML (ref 2.8–26)
PROPOXYPH UR QL SCN: NEGATIVE
PROT SERPL-MCNC: 5.6 G/DL (ref 6–8.2)
PROTHROMBIN TIME: 14.1 SEC (ref 12–14.6)
RBC # BLD AUTO: 3.78 M/UL (ref 4.2–5.4)
SODIUM SERPL-SCNC: 140 MMOL/L (ref 135–145)
WBC # BLD AUTO: 4.4 K/UL (ref 4.8–10.8)

## 2017-11-18 PROCEDURE — 85025 COMPLETE CBC W/AUTO DIFF WBC: CPT

## 2017-11-18 PROCEDURE — 84146 ASSAY OF PROLACTIN: CPT

## 2017-11-18 PROCEDURE — 84100 ASSAY OF PHOSPHORUS: CPT

## 2017-11-18 PROCEDURE — A9270 NON-COVERED ITEM OR SERVICE: HCPCS | Performed by: STUDENT IN AN ORGANIZED HEALTH CARE EDUCATION/TRAINING PROGRAM

## 2017-11-18 PROCEDURE — 700102 HCHG RX REV CODE 250 W/ 637 OVERRIDE(OP): Performed by: STUDENT IN AN ORGANIZED HEALTH CARE EDUCATION/TRAINING PROGRAM

## 2017-11-18 PROCEDURE — 86140 C-REACTIVE PROTEIN: CPT

## 2017-11-18 PROCEDURE — 86235 NUCLEAR ANTIGEN ANTIBODY: CPT | Mod: 91

## 2017-11-18 PROCEDURE — 82728 ASSAY OF FERRITIN: CPT

## 2017-11-18 PROCEDURE — 770006 HCHG ROOM/CARE - MED/SURG/GYN SEMI*

## 2017-11-18 PROCEDURE — 700111 HCHG RX REV CODE 636 W/ 250 OVERRIDE (IP): Performed by: INTERNAL MEDICINE

## 2017-11-18 PROCEDURE — 82533 TOTAL CORTISOL: CPT

## 2017-11-18 PROCEDURE — 700111 HCHG RX REV CODE 636 W/ 250 OVERRIDE (IP): Performed by: SPECIALIST

## 2017-11-18 PROCEDURE — 700102 HCHG RX REV CODE 250 W/ 637 OVERRIDE(OP): Performed by: INTERNAL MEDICINE

## 2017-11-18 PROCEDURE — 80053 COMPREHEN METABOLIC PANEL: CPT

## 2017-11-18 PROCEDURE — A9270 NON-COVERED ITEM OR SERVICE: HCPCS | Performed by: INTERNAL MEDICINE

## 2017-11-18 PROCEDURE — 83735 ASSAY OF MAGNESIUM: CPT

## 2017-11-18 PROCEDURE — 700111 HCHG RX REV CODE 636 W/ 250 OVERRIDE (IP): Performed by: STUDENT IN AN ORGANIZED HEALTH CARE EDUCATION/TRAINING PROGRAM

## 2017-11-18 PROCEDURE — 700105 HCHG RX REV CODE 258: Performed by: SPECIALIST

## 2017-11-18 PROCEDURE — 85652 RBC SED RATE AUTOMATED: CPT

## 2017-11-18 PROCEDURE — 80307 DRUG TEST PRSMV CHEM ANLYZR: CPT

## 2017-11-18 PROCEDURE — 700105 HCHG RX REV CODE 258: Performed by: STUDENT IN AN ORGANIZED HEALTH CARE EDUCATION/TRAINING PROGRAM

## 2017-11-18 PROCEDURE — 99232 SBSQ HOSP IP/OBS MODERATE 35: CPT | Mod: GC | Performed by: INTERNAL MEDICINE

## 2017-11-18 PROCEDURE — 85610 PROTHROMBIN TIME: CPT

## 2017-11-18 PROCEDURE — 770020 HCHG ROOM/CARE - TELE (206)

## 2017-11-18 RX ORDER — MAGNESIUM SULFATE HEPTAHYDRATE 40 MG/ML
4 INJECTION, SOLUTION INTRAVENOUS ONCE
Status: COMPLETED | OUTPATIENT
Start: 2017-11-18 | End: 2017-11-18

## 2017-11-18 RX ORDER — LORAZEPAM 1 MG/1
1 TABLET ORAL ONCE
Status: COMPLETED | OUTPATIENT
Start: 2017-11-18 | End: 2017-11-18

## 2017-11-18 RX ORDER — KETOROLAC TROMETHAMINE 30 MG/ML
30 INJECTION, SOLUTION INTRAMUSCULAR; INTRAVENOUS EVERY 6 HOURS PRN
Status: DISCONTINUED | OUTPATIENT
Start: 2017-11-18 | End: 2017-11-18

## 2017-11-18 RX ORDER — LORAZEPAM 1 MG/1
1-2 TABLET ORAL
Status: COMPLETED | OUTPATIENT
Start: 2017-11-18 | End: 2017-11-18

## 2017-11-18 RX ORDER — KETOROLAC TROMETHAMINE 30 MG/ML
15 INJECTION, SOLUTION INTRAMUSCULAR; INTRAVENOUS EVERY 6 HOURS PRN
Status: DISPENSED | OUTPATIENT
Start: 2017-11-18 | End: 2017-11-22

## 2017-11-18 RX ORDER — IBUPROFEN 600 MG/1
600 TABLET ORAL EVERY 6 HOURS PRN
Status: DISCONTINUED | OUTPATIENT
Start: 2017-11-18 | End: 2017-11-18

## 2017-11-18 RX ORDER — RISPERIDONE 1 MG/1
1 TABLET ORAL 2 TIMES DAILY
Status: ON HOLD | COMMUNITY
End: 2018-04-09

## 2017-11-18 RX ORDER — PREDNISONE 10 MG/1
10 TABLET ORAL DAILY
Status: DISCONTINUED | OUTPATIENT
Start: 2017-11-18 | End: 2017-11-22 | Stop reason: HOSPADM

## 2017-11-18 RX ADMIN — RISPERIDONE 1 MG: 1 TABLET, FILM COATED ORAL at 09:00

## 2017-11-18 RX ADMIN — GABAPENTIN 300 MG: 300 CAPSULE ORAL at 14:07

## 2017-11-18 RX ADMIN — DOXYCYCLINE 100 MG: 100 TABLET ORAL at 20:40

## 2017-11-18 RX ADMIN — PREDNISONE 10 MG: 10 TABLET ORAL at 10:09

## 2017-11-18 RX ADMIN — KETOROLAC TROMETHAMINE 30 MG: 30 INJECTION, SOLUTION INTRAMUSCULAR at 00:30

## 2017-11-18 RX ADMIN — Medication 100 MG: at 08:34

## 2017-11-18 RX ADMIN — GABAPENTIN 300 MG: 300 CAPSULE ORAL at 20:40

## 2017-11-18 RX ADMIN — RISPERIDONE 1 MG: 1 TABLET, FILM COATED ORAL at 20:40

## 2017-11-18 RX ADMIN — ENOXAPARIN SODIUM 40 MG: 100 INJECTION SUBCUTANEOUS at 08:34

## 2017-11-18 RX ADMIN — ASPIRIN 81 MG: 81 TABLET, CHEWABLE ORAL at 20:41

## 2017-11-18 RX ADMIN — KETOROLAC TROMETHAMINE 15 MG: 30 INJECTION, SOLUTION INTRAMUSCULAR at 16:11

## 2017-11-18 RX ADMIN — DOXYCYCLINE 100 MG: 100 TABLET ORAL at 08:33

## 2017-11-18 RX ADMIN — KETOROLAC TROMETHAMINE 15 MG: 30 INJECTION, SOLUTION INTRAMUSCULAR at 10:09

## 2017-11-18 RX ADMIN — STANDARDIZED SENNA CONCENTRATE AND DOCUSATE SODIUM 2 TABLET: 8.6; 5 TABLET, FILM COATED ORAL at 20:40

## 2017-11-18 RX ADMIN — GABAPENTIN 300 MG: 300 CAPSULE ORAL at 08:33

## 2017-11-18 RX ADMIN — ASPIRIN 81 MG: 81 TABLET, CHEWABLE ORAL at 08:33

## 2017-11-18 RX ADMIN — MAGNESIUM SULFATE IN WATER 4 G: 40 INJECTION, SOLUTION INTRAVENOUS at 10:09

## 2017-11-18 RX ADMIN — STANDARDIZED SENNA CONCENTRATE AND DOCUSATE SODIUM 2 TABLET: 8.6; 5 TABLET, FILM COATED ORAL at 08:33

## 2017-11-18 RX ADMIN — LORAZEPAM 1 MG: 1 TABLET ORAL at 20:40

## 2017-11-18 RX ADMIN — THERA TABS 1 TABLET: TAB at 08:34

## 2017-11-18 RX ADMIN — OMEPRAZOLE 20 MG: 20 CAPSULE, DELAYED RELEASE ORAL at 08:33

## 2017-11-18 RX ADMIN — LORAZEPAM 1 MG: 1 TABLET ORAL at 21:54

## 2017-11-18 RX ADMIN — DULOXETINE HYDROCHLORIDE 60 MG: 60 CAPSULE, DELAYED RELEASE ORAL at 08:33

## 2017-11-18 RX ADMIN — DULOXETINE HYDROCHLORIDE 60 MG: 60 CAPSULE, DELAYED RELEASE ORAL at 20:40

## 2017-11-18 RX ADMIN — SODIUM CHLORIDE: 9 INJECTION, SOLUTION INTRAVENOUS at 20:45

## 2017-11-18 RX ADMIN — DEXTROSE MONOHYDRATE 500 MG: 50 INJECTION, SOLUTION INTRAVENOUS at 20:40

## 2017-11-18 RX ADMIN — KETOROLAC TROMETHAMINE 15 MG: 30 INJECTION, SOLUTION INTRAMUSCULAR at 21:54

## 2017-11-18 RX ADMIN — SODIUM CHLORIDE: 9 INJECTION, SOLUTION INTRAVENOUS at 07:47

## 2017-11-18 RX ADMIN — DEXTROSE MONOHYDRATE 500 MG: 50 INJECTION, SOLUTION INTRAVENOUS at 08:57

## 2017-11-18 ASSESSMENT — ENCOUNTER SYMPTOMS
WEAKNESS: 0
HEMOPTYSIS: 0
BLOOD IN STOOL: 0
FOCAL WEAKNESS: 0
ABDOMINAL PAIN: 0
MYALGIAS: 0
DIARRHEA: 0
BLURRED VISION: 0
CONSTIPATION: 0
VOMITING: 0
DIZZINESS: 0
DOUBLE VISION: 0
NAUSEA: 1
SORE THROAT: 0
MEMORY LOSS: 1
SHORTNESS OF BREATH: 0
SEIZURES: 1
DEPRESSION: 0

## 2017-11-18 ASSESSMENT — PAIN SCALES - GENERAL
PAINLEVEL_OUTOF10: 8
PAINLEVEL_OUTOF10: 8
PAINLEVEL_OUTOF10: 3
PAINLEVEL_OUTOF10: 7
PAINLEVEL_OUTOF10: 8

## 2017-11-18 NOTE — CARE PLAN
Problem: Safety  Goal: Will remain free from falls  Outcome: PROGRESSING AS EXPECTED  Seizure precautions in place. Pt educated to call for assistance prior to getting up.    Problem: Pain Management  Goal: Pain level will decrease to patient's comfort goal  Outcome: PROGRESSING AS EXPECTED  Pt provided non pharmacological interventions as well as prn pain med. Pt able to tolerate pain level.

## 2017-11-18 NOTE — PROGRESS NOTES
Pt c/o headache 10 on 1 to 10 scale, 30mg IM toradol given in Right deltoid, will continue to monitor.

## 2017-11-18 NOTE — PROGRESS NOTES
Report received at bedside, patient care assumed, tele box on. Pt AAO x 4, no signs of distress noted at this time. POC discussed with pt and all questions answered. Pt c/o 8/10 pain in head. Medicated per MAR. Pt denies any additional needs at this time. Bed in lowest position, bed alarm on, pt educated on fall risk and verbalized understanding. Call light and personal possessions within reach. Will continue to monitor.

## 2017-11-18 NOTE — PROGRESS NOTES
Internal Medicine Interval Note  Note Author: Abby Márquez M.D.     Name Enedelia Pang     1970   Age/Sex 47 y.o. female   MRN [id number]   Code Status FULL     After 5PM or if no immediate response to page, please call for cross-coverage  Attending/Team: Dr. Judge / Petar See Patient List for primary contact information  Call [phone] to page    1st Call - Day Intern (R1):   Dr. Márquez 2nd Call - Day Sr. Resident (R2/R3):   Dr. Taveras          Reason for interval visit  (Principal Problem)   Seizures (CMS-HCC)    Interval Problem Daily Status Update  (24 hours)     Pt was seen & examined at bedside. Pt looks anxious & fatigue, but more awake & alert compared to admission day. Pt is having her breakfast on AM rounds.   Pt still complaints of terrible headache, 7-8/10 in frontal area, Improved compared to admission day. Pt asks benadryl for her headache, explained that benadryl can provoke seizure, Pt states understanding & agreed with further treatment plan. Headache unlikely due to flare up of her sero neagative RA ( ESR 19, C reactive protein 0.27, ferritin 21).   CT showed no acute abnormalities. D/C all medications that can lower seizure threshold. Neurology onboard.   EEG repeated on 2017 showed ''An abnormal video EEG recording in the awake, drowsy, and sleep state. Mild to moderate diffuse cerebral dysfunction, suggestive of an encephalopathic state.''  Per Neuro, continue Keppra but decrease the dose to 500mg bid. Neurology will follow her & will adjust the Kepra dosage. Pt may need to be monitor by long EEG.   Pt has a IV access port on right upper chest , per pt , it was put by  (surgeon) as outpatient procedure for poor venous access. Pt will need to follow up with PCP or her surgeon for that.     Review of Systems   Constitutional: Negative for malaise/fatigue.   HENT: Negative for sore throat.    Eyes: Negative for blurred vision and double vision.   Respiratory: Negative  for shortness of breath.    Cardiovascular: Negative for chest pain and leg swelling.   Gastrointestinal: Positive for nausea. Negative for abdominal pain, constipation, diarrhea and vomiting.   Genitourinary: Negative for dysuria.   Musculoskeletal: Negative for myalgias.   Neurological: Negative for dizziness and weakness.   Psychiatric/Behavioral: Negative for depression.       Consultants/Specialty  Neurology    Disposition  Inpatient    Quality Measures    Reviewed items::  Medications reviewed and Labs reviewed  Lopez catheter::  No Lopez  DVT prophylaxis pharmacological::  Enoxaparin (Lovenox)  Antibiotics:  Treating active infection/contamination beyond 24 hours perioperative coverage          Physical Exam       Vitals:    11/17/17 2100 11/18/17 0000 11/18/17 0500 11/18/17 0735   BP: (!) 97/61 106/60 104/65 115/75   Pulse: 62 87 91 82   Resp: 17 17 17 16   Temp: 36.7 °C (98.1 °F) 36.4 °C (97.6 °F) 36.4 °C (97.5 °F) 36.3 °C (97.3 °F)   TempSrc:       SpO2: 98% 98% 99% 95%   Weight:       Height:         Body mass index is 28.87 kg/m².    Oxygen Therapy:  Pulse Oximetry: 95 %, O2 (LPM): 2, O2 Delivery: Silicone Nasal Cannula    Physical Exam   Constitutional: She is oriented to person, place, and time. No distress.   HENT:   Head: Normocephalic and atraumatic.   Eyes: Conjunctivae and EOM are normal. Pupils are equal, round, and reactive to light.   Neck: Normal range of motion. Neck supple.   Cardiovascular: Normal rate, regular rhythm and normal heart sounds.    Pulmonary/Chest: Effort normal and breath sounds normal. No respiratory distress.   Abdominal: Soft. Bowel sounds are normal.   Musculoskeletal: Normal range of motion.   Skin Ulcers noted in upper & lower extremities, not inflamed, no Erythema or Discharge noted.    Neurological: She is alert and oriented to person, place, and time.   Motor 5/5. Sensation & CN 1-12 grossly intact. DTRs 2+ symmetrical.    Skin: Skin is warm.   Psychiatric: Affect  normal.         Lab Data Review:         11/18/2017  10:46 AM    Recent Labs      11/16/17 2312 11/17/17 0443 11/18/17 0430   SODIUM  139  139  140   POTASSIUM  4.0  4.1  4.0   CHLORIDE  103  104  107   CO2  28  30  28   BUN  8  7*  7*   CREATININE  0.52  0.57  0.53   MAGNESIUM  1.9   --   1.7   PHOSPHORUS   --    --   4.1   CALCIUM  9.0  8.9  8.7       Recent Labs      11/16/17 2312 11/17/17 0443 11/18/17   0430   ALTSGPT   --   85*  46   ASTSGOT   --   84*  28   ALKPHOSPHAT   --   112*  93   TBILIRUBIN   --   0.4  0.4   GLUCOSE  108*  104*  86       Recent Labs      11/16/17 2312 11/18/17 0430   RBC  4.41  3.78*   HEMOGLOBIN  13.4  11.7*   HEMATOCRIT  41.9  36.9*   PLATELETCT  307  262   PROTHROMBTM   --   14.1   INR   --   1.12   FERRITIN   --   21.0       Recent Labs      11/16/17 2312 11/17/17 0443 11/18/17   0430   WBC  6.1   --   4.4*   NEUTSPOLYS  73.20*   --   66.30   LYMPHOCYTES  18.80*   --   24.50   MONOCYTES  7.20   --   6.90   EOSINOPHILS  0.20   --   0.90   BASOPHILS  0.30   --   0.90   ASTSGOT   --   84*  28   ALTSGPT   --   85*  46   ALKPHOSPHAT   --   112*  93   TBILIRUBIN   --   0.4  0.4           Assessment/Plan     * Seizures (CMS-HCC)- (present on admission)   Assessment & Plan    - Patient experienced multiple episodes with seizure-like activity as recorded by   - Based on videos appears to have been focal seizure evolving to a bilateral tonic-clonic seizure   - EEG on 11/2017 showed nonspecific cortical dysfunction, recommended prolonged outpatient EEG monitoring  - CT head showed no acute abnormalities  - Patient started on Keppra, will continue Neurotin  - CPK : WNL, started on IV fluids  - Seizure/Aspiration precautions, Q4H Neuro checks   - Neurology on board. EEG on 11/17/2017 showed '''An abnormal video EEG recording in the awake, drowsy, and sleep state. Mild to moderate diffuse cerebral dysfunction, suggestive of an encephalopathic state.''  -Per Neuro,  continue Keppra but decrease the dose to 500mg bid        Chronic migraine- (present on admission)   Assessment & Plan    - Patient has history of chronic, debilitating migraines since age 14  - S/p occipital nerve stimulator placed in 2003  - Continue home meds        Seronegative rheumatoid arthritis (CMS-HCC)- (present on admission)   Assessment & Plan    - Patient has history of seronegative rheumatoid arthritis  - Currently following rheumatology, failed trial of methotrexate due to elevated liver enzymes  - denied joint pain for this admission  -Unlikely flare up of her sero neagative RA ( ESR 19, C reactive protein 0.27, ferritin 21).  -continue home Prednisone  - Will continue to monitor        Pseudotumor cerebri- (present on admission)   Assessment & Plan    - Patient has history of pseudotumor cerebri status post  shunt placed approximately 5 months ago  - Currently well-controlled, continue monitor  - considering acetazolamide if it is getting worse        Wounds, multiple- (present on admission)   Assessment & Plan    - Patient has history of multiple ulcer like wounds on hands and lower legs  - Currently following up with rheumatology and hematology for possible hypercoagulability or autoimmune disease  - We will continue to follow, no signs of infection or indication for a wound care consult at this time

## 2017-11-18 NOTE — EEG PROGRESS NOTE
VIDEO ELECTROENCEPHALOGRAM REPORT      Referring MD: Dr. Ramos.    DOS: 11/17/2017 (total recording of 32 minutes).     INDICATION:  Enedelia Pang 47 y.o. female presenting with possible seizures, shaking spells.     CURRENT ANTIEPILEPTIC REGIMEN: Lorazepam.     TECHNIQUE: 30 channel video electroencephalogram (EEG) was performed in accordance with the international 10-20 system. The study was reviewed in bipolar and referential montages. The recording examined the patient during wakeful and drowsy/sleep state(s).     DESCRIPTION OF THE RECORD:  During the wakefulness, the background showed a symmetrical 7 Hz theta activity posteriorly with amplitude of 70 mV.  There was reactivity to eye closure/opening.  A normal anterior-posterior gradient was noted with faster beta frequencies seen anteriorly.  During drowsiness, theta/delta frequencies were seen.    During the sleep state, background shows diffuse high-amplitude 4-5 Hz delta activity.  Symmetrical high-amplitude vertex sharps were seen in the leads over the central regions.     ACTIVATION PROCEDURES:   Intermittent Photic stimulation was performed in a stepwise fashion from 1 to 30 Hz, however it failed to produce any significant background changes.       ICTAL AND/OR INTERICTAL FINDINGS:   No focal or generalized epileptiform activity noted. No regional slowing was seen during this routine study.  No clinical events or seizures were reported or recorded during the study.     EKG: sampling of the EKG recording demonstrated sinus rhythm.       INTERPRETATION:  This is an abnormal video EEG recording in the awake, drowsy, and sleep state(s). Mild to moderate diffuse cerebral dysfunction, suggestive of an encephalopathic state. Clinical correlation is recommended.    Note: This EEG does not rule out epilepsy.  If the clinical suspicion remains high for seizures, a prolonged recording to capture clinical or subclinical events may be helpful.        Ed Linares  MD  Medical Director, Epilepsy and Neurodiagnostics.   Clinical  of Neurology [facility] of Medicine.   Diplomate in Neurology, Epilepsy, and Electrodiagnostic Medicine.   Office: [phone]  Fax: [phone]

## 2017-11-18 NOTE — PROGRESS NOTES
IMPRESSION    1. Shaking spells-- recorded by her -- reviewed-- psychogenic seizures vs true epileptic seizures  2. chronic migraine headaches, seronegative rheumatoid arthritis, pseudotumor cerebri s/p R   shunt 3 months ago  3. Occipital nerve stimulation replacement 2003 for headache?    MANAGEMENT    The patient's  stated that the patient is not back to her baseline yet  Still confused?   Although the patient could conduct normal conversation with me though  Discussed with patient's   ________________________________________________________________________      Will offer prolonged video EEG Monday X 24 hours (next week)  Also spinal tap next week ( low grade fever)  ________________________________________________________________________      Physical Exam   Constitutional: She is oriented to person, place, and time and well-developed, well-nourished, and in no distress.   HENT:   Head: Normocephalic.   Eyes: Pupils are equal, round, and reactive to light.   Pulmonary/Chest: Effort normal.   Abdominal: Soft.   Neurological: She is alert and oriented to person, place, and time.   Skin: Skin is warm.         Vitals:    11/17/17 2100 11/18/17 0000 11/18/17 0500 11/18/17 0735   BP: (!) 97/61 106/60 104/65 115/75   Pulse: 62 87 91 82   Resp: 17 17 17 16   Temp: 36.7 °C (98.1 °F) 36.4 °C (97.6 °F) 36.4 °C (97.5 °F) 36.3 °C (97.3 °F)   TempSrc:       SpO2: 98% 98% 99% 95%   Weight:       Height:         Review of Systems   Respiratory: Negative for hemoptysis.    Gastrointestinal: Negative for blood in stool.   Genitourinary: Negative for hematuria.   Neurological: Positive for seizures. Negative for focal weakness.   Psychiatric/Behavioral: Positive for memory loss.           ________________________________________________________________________    REFERENCE Occipital nerve stimulation    Occipital nerve stimulation is a surgical procedure that may be useful in the treatment of chronic and  severe headache disorders, such as chronic migraines, that do not respond well to other therapies.  Occipital nerve stimulation was first used to treat headaches in 1977, but it's still considered a treatment in development.    Research shows occipital nerve stimulation may improve headaches for some people, but the results are inconsistent.       https://[email or web address]/diseases-conditions/migraine-headache/expert-answers/occipital-nerve-stimulation/faq-82981743  ________________________________________________________________________

## 2017-11-18 NOTE — PROGRESS NOTES
Received bedside report from night shift nurse. Pt is alert and oriented. Seizure precautions implemented. Pt has complaints of pain, notified MD of Toradol IM. MD switched IM to IV. Otherwise pt has no other concerns, will continue to monitor.

## 2017-11-18 NOTE — PROCEDURES
VIDEO ELECTROENCEPHALOGRAM REPORT        Referring MD: Dr. Ramos.     DOS: 11/17/2017 (total recording of 32 minutes).      INDICATION:  Enedelia Pang 47 y.o. female presenting with possible seizures, shaking spells.      CURRENT ANTIEPILEPTIC REGIMEN: Lorazepam.      TECHNIQUE: 30 channel video electroencephalogram (EEG) was performed in accordance with the international 10-20 system. The study was reviewed in bipolar and referential montages. The recording examined the patient during wakeful and drowsy/sleep state(s).      DESCRIPTION OF THE RECORD:  During the wakefulness, the background showed a symmetrical 7 Hz theta activity posteriorly with amplitude of 70 mV.  There was reactivity to eye closure/opening.  A normal anterior-posterior gradient was noted with faster beta frequencies seen anteriorly.  During drowsiness, theta/delta frequencies were seen.     During the sleep state, background shows diffuse high-amplitude 4-5 Hz delta activity.  Symmetrical high-amplitude vertex sharps were seen in the leads over the central regions.      ACTIVATION PROCEDURES:   Intermittent Photic stimulation was performed in a stepwise fashion from 1 to 30 Hz, however it failed to produce any significant background changes.         ICTAL AND/OR INTERICTAL FINDINGS:   No focal or generalized epileptiform activity noted. No regional slowing was seen during this routine study.  No clinical events or seizures were reported or recorded during the study.      EKG: sampling of the EKG recording demonstrated sinus rhythm.         INTERPRETATION:  This is an abnormal video EEG recording in the awake, drowsy, and sleep state(s). Mild to moderate diffuse cerebral dysfunction, suggestive of an encephalopathic state. Clinical correlation is recommended.     Note: This EEG does not rule out epilepsy.  If the clinical suspicion remains high for seizures, a prolonged recording to capture clinical or subclinical events may be  helpful.           Ed Linares MD  Medical Director, Epilepsy and Neurodiagnostics.   Clinical  of Neurology [facility] of Medicine.   Diplomate in Neurology, Epilepsy, and Electrodiagnostic Medicine.   Office: [phone]  Fax: [phone]    AJAY / EUGENE    DD:  11/17/2017 16:55:19  DT:  11/17/2017 17:04:56    D#:  [id number]  Job#:  [id number]

## 2017-11-19 PROBLEM — F11.93 OPIATE WITHDRAWAL (HCC): Status: ACTIVE | Noted: 2017-11-19

## 2017-11-19 PROCEDURE — A9270 NON-COVERED ITEM OR SERVICE: HCPCS | Performed by: STUDENT IN AN ORGANIZED HEALTH CARE EDUCATION/TRAINING PROGRAM

## 2017-11-19 PROCEDURE — 700111 HCHG RX REV CODE 636 W/ 250 OVERRIDE (IP): Performed by: SPECIALIST

## 2017-11-19 PROCEDURE — 700111 HCHG RX REV CODE 636 W/ 250 OVERRIDE (IP): Performed by: STUDENT IN AN ORGANIZED HEALTH CARE EDUCATION/TRAINING PROGRAM

## 2017-11-19 PROCEDURE — 700105 HCHG RX REV CODE 258: Performed by: SPECIALIST

## 2017-11-19 PROCEDURE — 700105 HCHG RX REV CODE 258: Performed by: STUDENT IN AN ORGANIZED HEALTH CARE EDUCATION/TRAINING PROGRAM

## 2017-11-19 PROCEDURE — 700111 HCHG RX REV CODE 636 W/ 250 OVERRIDE (IP): Performed by: INTERNAL MEDICINE

## 2017-11-19 PROCEDURE — 700102 HCHG RX REV CODE 250 W/ 637 OVERRIDE(OP): Performed by: INTERNAL MEDICINE

## 2017-11-19 PROCEDURE — 700102 HCHG RX REV CODE 250 W/ 637 OVERRIDE(OP): Performed by: STUDENT IN AN ORGANIZED HEALTH CARE EDUCATION/TRAINING PROGRAM

## 2017-11-19 PROCEDURE — 770006 HCHG ROOM/CARE - MED/SURG/GYN SEMI*

## 2017-11-19 PROCEDURE — A9270 NON-COVERED ITEM OR SERVICE: HCPCS | Performed by: INTERNAL MEDICINE

## 2017-11-19 PROCEDURE — 99232 SBSQ HOSP IP/OBS MODERATE 35: CPT | Mod: GC | Performed by: INTERNAL MEDICINE

## 2017-11-19 RX ORDER — OXYCODONE HYDROCHLORIDE 10 MG/1
10 TABLET ORAL EVERY 4 HOURS PRN
Status: DISCONTINUED | OUTPATIENT
Start: 2017-11-19 | End: 2017-11-22 | Stop reason: HOSPADM

## 2017-11-19 RX ORDER — OXYCODONE HYDROCHLORIDE 5 MG/1
5 TABLET ORAL EVERY 6 HOURS PRN
Status: DISCONTINUED | OUTPATIENT
Start: 2017-11-19 | End: 2017-11-19

## 2017-11-19 RX ADMIN — RISPERIDONE 1 MG: 1 TABLET, FILM COATED ORAL at 21:37

## 2017-11-19 RX ADMIN — ASPIRIN 81 MG: 81 TABLET, CHEWABLE ORAL at 21:37

## 2017-11-19 RX ADMIN — KETOROLAC TROMETHAMINE 15 MG: 30 INJECTION, SOLUTION INTRAMUSCULAR at 05:53

## 2017-11-19 RX ADMIN — Medication 100 MG: at 09:58

## 2017-11-19 RX ADMIN — PREDNISONE 10 MG: 10 TABLET ORAL at 09:58

## 2017-11-19 RX ADMIN — SODIUM CHLORIDE: 9 INJECTION, SOLUTION INTRAVENOUS at 17:44

## 2017-11-19 RX ADMIN — OXYCODONE HYDROCHLORIDE 10 MG: 10 TABLET ORAL at 17:44

## 2017-11-19 RX ADMIN — STANDARDIZED SENNA CONCENTRATE AND DOCUSATE SODIUM 2 TABLET: 8.6; 5 TABLET, FILM COATED ORAL at 09:58

## 2017-11-19 RX ADMIN — OXYCODONE HYDROCHLORIDE 10 MG: 10 TABLET ORAL at 21:37

## 2017-11-19 RX ADMIN — DOXYCYCLINE 100 MG: 100 TABLET ORAL at 21:37

## 2017-11-19 RX ADMIN — DEXTROSE MONOHYDRATE 500 MG: 50 INJECTION, SOLUTION INTRAVENOUS at 10:12

## 2017-11-19 RX ADMIN — KETOROLAC TROMETHAMINE 15 MG: 30 INJECTION, SOLUTION INTRAMUSCULAR at 14:08

## 2017-11-19 RX ADMIN — DOXYCYCLINE 100 MG: 100 TABLET ORAL at 09:58

## 2017-11-19 RX ADMIN — DEXTROSE MONOHYDRATE 500 MG: 50 INJECTION, SOLUTION INTRAVENOUS at 22:27

## 2017-11-19 RX ADMIN — DULOXETINE HYDROCHLORIDE 60 MG: 60 CAPSULE, DELAYED RELEASE ORAL at 09:58

## 2017-11-19 RX ADMIN — GABAPENTIN 300 MG: 300 CAPSULE ORAL at 14:13

## 2017-11-19 RX ADMIN — GABAPENTIN 300 MG: 300 CAPSULE ORAL at 21:37

## 2017-11-19 RX ADMIN — RISPERIDONE 1 MG: 1 TABLET, FILM COATED ORAL at 09:59

## 2017-11-19 RX ADMIN — OMEPRAZOLE 20 MG: 20 CAPSULE, DELAYED RELEASE ORAL at 09:58

## 2017-11-19 RX ADMIN — DULOXETINE HYDROCHLORIDE 60 MG: 60 CAPSULE, DELAYED RELEASE ORAL at 21:37

## 2017-11-19 RX ADMIN — GABAPENTIN 300 MG: 300 CAPSULE ORAL at 09:58

## 2017-11-19 RX ADMIN — ASPIRIN 81 MG: 81 TABLET, CHEWABLE ORAL at 09:58

## 2017-11-19 RX ADMIN — THERA TABS 1 TABLET: TAB at 09:58

## 2017-11-19 RX ADMIN — ENOXAPARIN SODIUM 40 MG: 100 INJECTION SUBCUTANEOUS at 09:58

## 2017-11-19 RX ADMIN — OXYCODONE HYDROCHLORIDE 10 MG: 10 TABLET ORAL at 13:31

## 2017-11-19 RX ADMIN — SODIUM CHLORIDE: 9 INJECTION, SOLUTION INTRAVENOUS at 05:46

## 2017-11-19 ASSESSMENT — PAIN SCALES - GENERAL
PAINLEVEL_OUTOF10: 10
PAINLEVEL_OUTOF10: 7
PAINLEVEL_OUTOF10: 10
PAINLEVEL_OUTOF10: 9
PAINLEVEL_OUTOF10: 6
PAINLEVEL_OUTOF10: 7
PAINLEVEL_OUTOF10: 9
PAINLEVEL_OUTOF10: 10
PAINLEVEL_OUTOF10: 9

## 2017-11-19 ASSESSMENT — ENCOUNTER SYMPTOMS
NAUSEA: 1
DOUBLE VISION: 0
DIZZINESS: 0
ABDOMINAL PAIN: 0
DIARRHEA: 0
MYALGIAS: 0
VOMITING: 0
DEPRESSION: 0
SEIZURES: 1
BLOOD IN STOOL: 0
HEMOPTYSIS: 0
FOCAL WEAKNESS: 0
SORE THROAT: 0
FEVER: 0
BLURRED VISION: 0
WEAKNESS: 1
COUGH: 0
CONSTIPATION: 0
SHORTNESS OF BREATH: 0
MEMORY LOSS: 1

## 2017-11-19 ASSESSMENT — LIFESTYLE VARIABLES: DO YOU DRINK ALCOHOL: NO

## 2017-11-19 NOTE — PROGRESS NOTES
I have personally evaluated and examined this patient and agree with the findings as documented in the resident note except as documented in this attending note.  I am actively involved in the patient's care.       Continuous pulse ox  ESR,CRP and ferritin wnl,Sal[place] inflammatory flare  Appreciate Neuro help,FU rec  Low threshold for ABG,EKG,Trop,CXR and CT Head  Discussed with RN    Resident note to follow

## 2017-11-19 NOTE — PROGRESS NOTES
Med rec updated and complete.  Allergies reviewed.  Met with pt at bedside.  Pt indicated no antibiotics in last 30 days.  Removed all medications that pt stated that she no longer takes.

## 2017-11-19 NOTE — PROGRESS NOTES
IMPRESSION    1. Shaking spells-- recorded by her -- reviewed-- psychogenic seizures vs true epileptic seizures  2. chronic migraine headaches, seronegative rheumatoid arthritis, pseudotumor cerebri s/p R   shunt 3 months ago  3. Occipital nerve stimulation replacement 2003 for headache?  4. Chronic narcotic intake for headache    MANAGEMENT    The patient's  stated that the patient is not back to her baseline yet  Slow in verbal response    Narcotic withdrawal-- used to take narcotic for her headache    Although the patient could conduct normal conversation with me though  Discussed with patient's   ________________________________________________________________________      Will offer prolonged video EEG Monday X 24 hours (next week)  Also spinal tap next week ( low grade fever)  ________________________________________________________________________      Physical Exam   Constitutional: She is oriented to person, place, and time and well-developed, well-nourished, and in no distress.   HENT:   Head: Normocephalic.   Eyes: Pupils are equal, round, and reactive to light.   Pulmonary/Chest: Effort normal.   Abdominal: Soft.   Neurological: She is alert and oriented to person, place, and time.   Skin: Skin is warm.         Vitals:    11/18/17 2000 11/19/17 0000 11/19/17 0400 11/19/17 0730   BP: 130/87 114/74 109/77 112/71   Pulse: (!) 105 94 92 82   Resp: 18 16 16 18   Temp: 36.4 °C (97.6 °F) 36.6 °C (97.8 °F) 37.1 °C (98.7 °F) 35.9 °C (96.7 °F)   TempSrc:       SpO2: 95% 100% 98% 98%   Weight: 74 kg (163 lb 2.3 oz)      Height:         Review of Systems   Respiratory: Negative for hemoptysis.    Gastrointestinal: Negative for blood in stool.   Genitourinary: Negative for hematuria.   Neurological: Positive for seizures. Negative for focal weakness.   Psychiatric/Behavioral: Positive for memory loss.            ________________________________________________________________________    REFERENCE Occipital nerve stimulation    Occipital nerve stimulation is a surgical procedure that may be useful in the treatment of chronic and severe headache disorders, such as chronic migraines, that do not respond well to other therapies.  Occipital nerve stimulation was first used to treat headaches in 1977, but it's still considered a treatment in development.    Research shows occipital nerve stimulation may improve headaches for some people, but the results are inconsistent.       https://www.[facility].org/diseases-conditions/migraine-headache/expert-answers/occipital-nerve-stimulation/faq-23128318  ________________________________________________________________________

## 2017-11-19 NOTE — CARE PLAN
Problem: Safety  Goal: Will remain free from falls    Intervention: Implement fall precautions  Patient educated on safety precautions, the utilization of the call light, and bed alarm. Patient verbalized understanding.       Problem: Pain Management  Goal: Pain level will decrease to patient's comfort goal    Intervention: Follow pain managment plan developed in collaboration with patient and Interdisciplinary Team  Patient assessment done every four hours or more as needed. Pain medications available and given per MAR. Non-pharmacologic methods to improve pain offered. Resident notified of pts request for stronger pain meds.

## 2017-11-19 NOTE — PROGRESS NOTES
Internal Medicine Interval Note  Note Author: Abby Márquez M.D.     Name Enedelia Pang     1970   Age/Sex 47 y.o. female   MRN [id number]   Code Status FULL     After 5PM or if no immediate response to page, please call for cross-coverage  Attending/Team: Dr. Judge / Petar See Patient List for primary contact information  Call [phone] to page    1st Call - Day Intern (R1):   Dr. Márquez 2nd Call - Day Sr. Resident (R2/R3):   Dr. Taveras         Reason for interval visit  (Principal Problem)   Seizures (CMS-HCC)    Interval Problem Daily Status Update  (24 hours)   Pt was seen & examined at bedside. Pt looks anxious & fatigue, but more awake & alert compared to admission day. Pt is having her breakfast on AM rounds.  at Bedside. Pt still complaints of terrible headache, 7-8/10 in frontal area, Improved compared to admission day. CT showed no acute abnormalities. D/C all medications that can lower seizure threshold especially benadryl. Pt was asking Roxicodone 10 mg Q4H, stating that she has withdrawal symptoms. UDS negative on admission. Pt was shaking, anxious, tearful, screaming at the staffs.     Neurology on board. EEG repeated on 2017 showed ''An abnormal video EEG recording in the awake, drowsy, and sleep state. Mild to moderate diffuse cerebral dysfunction, suggestive of an encephalopathic state.'' Scheduled for 24 hr EEG on 2017. Spinal Tap next week. Continue Keppra but decrease the dose to 500mg bid. Neurology will follow her & will adjust the Kepra dosage.     History of sero  her sero neagative RA ( on admission, ESR 19, C reactive protein 0.27, ferritin 21). Will call her rheumatologist for records.  ordered TPO AB, SSA , SSB antibodies. Will follow those results.     Pt has a IV access port on right upper chest , per pt , it was put by  (surgeon) as outpatient procedure for poor venous access. Pt will need to follow up with PCP or her surgeon for  that.     Review of Systems   Constitutional: Positive for malaise/fatigue. Negative for fever.   HENT: Negative for congestion and sore throat.    Eyes: Negative for blurred vision and double vision.   Respiratory: Negative for cough and shortness of breath.    Cardiovascular: Negative for chest pain and leg swelling.   Gastrointestinal: Positive for nausea. Negative for abdominal pain, constipation, diarrhea and vomiting.   Genitourinary: Negative for dysuria.   Musculoskeletal: Negative for myalgias.   Neurological: Positive for weakness. Negative for dizziness.   Psychiatric/Behavioral: Negative for depression.       Consultants/Specialty  Neurology    Disposition  Inpatient    Quality Measures    Reviewed items::  Labs reviewed and Medications reviewed  Lopez catheter::  No Lopez  DVT prophylaxis pharmacological::  Enoxaparin (Lovenox)          Physical Exam       Vitals:    11/19/17 0000 11/19/17 0400 11/19/17 0730 11/19/17 1318   BP: 114/74 109/77 112/71 118/71   Pulse: 94 92 82 81   Resp: 16 16 18 18   Temp: 36.6 °C (97.8 °F) 37.1 °C (98.7 °F) 35.9 °C (96.7 °F) 36.1 °C (97 °F)   TempSrc:       SpO2: 100% 98% 98% 93%   Weight:       Height:         Body mass index is 28.91 kg/m². Weight: 74 kg (163 lb 2.3 oz)  Oxygen Therapy:  Pulse Oximetry: 93 %, O2 (LPM): 1, O2 Delivery: Silicone Nasal Cannula    Physical Exam   Constitutional: She is oriented to person, place, and time and well-developed, well-nourished, and in no distress.   HENT:   Head: Normocephalic and atraumatic.   Eyes: Conjunctivae and EOM are normal. Pupils are equal, round, and reactive to light.   Neck: Normal range of motion. Neck supple.   Cardiovascular: Normal rate, regular rhythm and normal heart sounds.    Pulmonary/Chest: Effort normal and breath sounds normal.   Abdominal: Soft. Bowel sounds are normal.   Musculoskeletal: Normal range of motion.   Skin Ulcers noted in upper & lower extremities, not inflamed, no Erythema or Discharge  noted.     Neurological: She is alert and oriented to person, place, and time.   Motor 5/5. Sensation & CN 1-12 grossly intact. DTRs 2+ symmetrical.    Skin: Skin is warm.   Psychiatric: Affect normal.         Lab Data Review:         11/19/2017  1:19 PM    Recent Labs      11/16/17 2312 11/17/17 0443 11/18/17 0430   SODIUM  139  139  140   POTASSIUM  4.0  4.1  4.0   CHLORIDE  103  104  107   CO2  28  30  28   BUN  8  7*  7*   CREATININE  0.52  0.57  0.53   MAGNESIUM  1.9   --   1.7   PHOSPHORUS   --    --   4.1   CALCIUM  9.0  8.9  8.7       Recent Labs      11/16/17 2312 11/17/17 0443 11/18/17   0430   ALTSGPT   --   85*  46   ASTSGOT   --   84*  28   ALKPHOSPHAT   --   112*  93   TBILIRUBIN   --   0.4  0.4   GLUCOSE  108*  104*  86       Recent Labs      11/16/17 2312 11/18/17 0430   RBC  4.41  3.78*   HEMOGLOBIN  13.4  11.7*   HEMATOCRIT  41.9  36.9*   PLATELETCT  307  262   PROTHROMBTM   --   14.1   INR   --   1.12   FERRITIN   --   21.0       Recent Labs      11/16/17 2312 11/17/17 0443 11/18/17   0430   WBC  6.1   --   4.4*   NEUTSPOLYS  73.20*   --   66.30   LYMPHOCYTES  18.80*   --   24.50   MONOCYTES  7.20   --   6.90   EOSINOPHILS  0.20   --   0.90   BASOPHILS  0.30   --   0.90   ASTSGOT   --   84*  28   ALTSGPT   --   85*  46   ALKPHOSPHAT   --   112*  93   TBILIRUBIN   --   0.4  0.4           Assessment/Plan     * Seizures (CMS-HCC)- (present on admission)   Assessment & Plan    - Patient experienced multiple episodes with seizure-like activity as recorded by   - Based on videos appears to have been focal seizure evolving to a bilateral tonic-clonic seizure   - EEG on 11/2017 showed nonspecific cortical dysfunction, recommended prolonged outpatient EEG monitoring  - CT head showed no acute abnormalities  - Patient started on Keppra, will continue Neurotin  - CPK : WNL, started on IV fluids  - Seizure/Aspiration precautions, Q4H Neuro checks   - Neurology on board. EEG on  11/17/2017 showed '''An abnormal video EEG recording in the awake, drowsy, and sleep state. Mild to moderate diffuse cerebral dysfunction, suggestive of an encephalopathic state.''  -Per Neuro, continue Keppra but decrease the dose to 500mg bid  -Scheduled for 24 hr EEG on Monday 11/20/2017. Spinal Tap next week.        Opiate withdrawal (CMS-HCC)   Assessment & Plan    - per pt & , pt is taking roxicodone 10 mg Q4H x 9 months  - Pt was shaking, anxious, tearful, screaming at the staffs.   - UDS negative on admission  - continue her home medication            Pseudotumor cerebri- (present on admission)   Assessment & Plan    - Patient has history of pseudotumor cerebri status post  shunt placed approximately 5 months ago  - Currently well-controlled, continue monitor  - considering acetazolamide if it is getting worse        Chronic migraine- (present on admission)   Assessment & Plan    - Patient has history of chronic, debilitating migraines since age 14  - S/p occipital nerve stimulator placed in 2003  - Continue home meds        Seronegative rheumatoid arthritis (CMS-HCC)- (present on admission)   Assessment & Plan    - Patient has history of seronegative rheumatoid arthritis  - Currently following rheumatology, failed trial of methotrexate due to elevated liver enzymes  - denied joint pain for this admission  -Unlikely flare up of her sero neagative RA ( ESR 19, C reactive protein 0.27, ferritin 21).  -continue home Prednisone  -Will call her rheumatologist for records.  ordered TPO AB, SSA , SSB antibodoes. Will follow those results.   - Will continue to monitor        Wounds, multiple- (present on admission)   Assessment & Plan    - Patient has history of multiple ulcer like wounds on hands and lower legs  - Currently following up with rheumatology and hematology for possible hypercoagulability or autoimmune disease  - We will continue to follow, no signs of infection or indication for a wound  care consult at this time

## 2017-11-19 NOTE — ASSESSMENT & PLAN NOTE
- per pt & , pt is taking roxicodone 10 mg Q4H x 9 months  - Pt was shaking, anxious, tearful, screaming at the staffs, stating that she is withdrawing from roxicodone  - UDS negative on admission  - continue her home medication

## 2017-11-19 NOTE — PROGRESS NOTES
Handoff report received. Assume patient care. Patient resting in bed. Patient not in distress, AAOX 4. Bed alarm is on. Safety precautions in place. Call light and personal belongings within reach. Educated to call for assistance if needed.

## 2017-11-19 NOTE — PROGRESS NOTES
Report received at bedside, assumed care. Pt is awake in bed. A&O x 4. Pt c/o pain (headache, see MAR). No other concerns, complains or distress. Tele box on. Chart reviewed and POC updated with patient. Bed in lowest position and call light within reach.

## 2017-11-20 LAB
BASOPHILS # BLD AUTO: 0.8 % (ref 0–1.8)
BASOPHILS # BLD: 0.05 K/UL (ref 0–0.12)
EOSINOPHIL # BLD AUTO: 0.03 K/UL (ref 0–0.51)
EOSINOPHIL NFR BLD: 0.5 % (ref 0–6.9)
ERYTHROCYTE [DISTWIDTH] IN BLOOD BY AUTOMATED COUNT: 44.6 FL (ref 35.9–50)
HCT VFR BLD AUTO: 35.5 % (ref 37–47)
HGB BLD-MCNC: 11.7 G/DL (ref 12–16)
IMM GRANULOCYTES # BLD AUTO: 0.01 K/UL (ref 0–0.11)
IMM GRANULOCYTES NFR BLD AUTO: 0.2 % (ref 0–0.9)
LYMPHOCYTES # BLD AUTO: 1.68 K/UL (ref 1–4.8)
LYMPHOCYTES NFR BLD: 28 % (ref 22–41)
MCH RBC QN AUTO: 31 PG (ref 27–33)
MCHC RBC AUTO-ENTMCNC: 33 G/DL (ref 33.6–35)
MCV RBC AUTO: 94.2 FL (ref 81.4–97.8)
MONOCYTES # BLD AUTO: 0.46 K/UL (ref 0–0.85)
MONOCYTES NFR BLD AUTO: 7.7 % (ref 0–13.4)
NEUTROPHILS # BLD AUTO: 3.78 K/UL (ref 2–7.15)
NEUTROPHILS NFR BLD: 62.8 % (ref 44–72)
NRBC # BLD AUTO: 0 K/UL
NRBC BLD AUTO-RTO: 0 /100 WBC
PLATELET # BLD AUTO: 292 K/UL (ref 164–446)
PMV BLD AUTO: 9.7 FL (ref 9–12.9)
RBC # BLD AUTO: 3.77 M/UL (ref 4.2–5.4)
T4 FREE SERPL-MCNC: 0.78 NG/DL (ref 0.53–1.43)
WBC # BLD AUTO: 6 K/UL (ref 4.8–10.8)

## 2017-11-20 PROCEDURE — 700102 HCHG RX REV CODE 250 W/ 637 OVERRIDE(OP): Performed by: INTERNAL MEDICINE

## 2017-11-20 PROCEDURE — 700111 HCHG RX REV CODE 636 W/ 250 OVERRIDE (IP): Performed by: STUDENT IN AN ORGANIZED HEALTH CARE EDUCATION/TRAINING PROGRAM

## 2017-11-20 PROCEDURE — 95951 EEG-24 HR: CPT

## 2017-11-20 PROCEDURE — 84439 ASSAY OF FREE THYROXINE: CPT

## 2017-11-20 PROCEDURE — 700105 HCHG RX REV CODE 258: Performed by: SPECIALIST

## 2017-11-20 PROCEDURE — 700102 HCHG RX REV CODE 250 W/ 637 OVERRIDE(OP): Performed by: STUDENT IN AN ORGANIZED HEALTH CARE EDUCATION/TRAINING PROGRAM

## 2017-11-20 PROCEDURE — A9270 NON-COVERED ITEM OR SERVICE: HCPCS | Performed by: INTERNAL MEDICINE

## 2017-11-20 PROCEDURE — 90471 IMMUNIZATION ADMIN: CPT

## 2017-11-20 PROCEDURE — A9270 NON-COVERED ITEM OR SERVICE: HCPCS | Performed by: STUDENT IN AN ORGANIZED HEALTH CARE EDUCATION/TRAINING PROGRAM

## 2017-11-20 PROCEDURE — G8988 SELF CARE GOAL STATUS: HCPCS | Mod: CI

## 2017-11-20 PROCEDURE — G8987 SELF CARE CURRENT STATUS: HCPCS | Mod: CJ

## 2017-11-20 PROCEDURE — 3E0234Z INTRODUCTION OF SERUM, TOXOID AND VACCINE INTO MUSCLE, PERCUTANEOUS APPROACH: ICD-10-PCS | Performed by: INTERNAL MEDICINE

## 2017-11-20 PROCEDURE — 97165 OT EVAL LOW COMPLEX 30 MIN: CPT

## 2017-11-20 PROCEDURE — 700111 HCHG RX REV CODE 636 W/ 250 OVERRIDE (IP): Performed by: HOSPITALIST

## 2017-11-20 PROCEDURE — 770006 HCHG ROOM/CARE - MED/SURG/GYN SEMI*

## 2017-11-20 PROCEDURE — 700111 HCHG RX REV CODE 636 W/ 250 OVERRIDE (IP): Performed by: INTERNAL MEDICINE

## 2017-11-20 PROCEDURE — 90670 PCV13 VACCINE IM: CPT | Performed by: HOSPITALIST

## 2017-11-20 PROCEDURE — 99233 SBSQ HOSP IP/OBS HIGH 50: CPT | Mod: GC | Performed by: HOSPITALIST

## 2017-11-20 PROCEDURE — 90686 IIV4 VACC NO PRSV 0.5 ML IM: CPT | Performed by: HOSPITALIST

## 2017-11-20 PROCEDURE — 700105 HCHG RX REV CODE 258: Performed by: STUDENT IN AN ORGANIZED HEALTH CARE EDUCATION/TRAINING PROGRAM

## 2017-11-20 PROCEDURE — 700111 HCHG RX REV CODE 636 W/ 250 OVERRIDE (IP): Performed by: SPECIALIST

## 2017-11-20 PROCEDURE — 85025 COMPLETE CBC W/AUTO DIFF WBC: CPT

## 2017-11-20 RX ADMIN — RISPERIDONE 1 MG: 1 TABLET, FILM COATED ORAL at 20:13

## 2017-11-20 RX ADMIN — DULOXETINE HYDROCHLORIDE 60 MG: 60 CAPSULE, DELAYED RELEASE ORAL at 08:04

## 2017-11-20 RX ADMIN — OXYCODONE HYDROCHLORIDE 10 MG: 10 TABLET ORAL at 08:04

## 2017-11-20 RX ADMIN — PREDNISONE 10 MG: 10 TABLET ORAL at 08:04

## 2017-11-20 RX ADMIN — INFLUENZA A VIRUS A/MICHIGAN/45/2015 X-275 (H1N1) ANTIGEN (FORMALDEHYDE INACTIVATED), INFLUENZA A VIRUS A/HONG KONG/4801/2014 X-263B (H3N2) ANTIGEN (FORMALDEHYDE INACTIVATED), INFLUENZA B VIRUS B/PHUKET/3073/2013 ANTIGEN (FORMALDEHYDE INACTIVATED), AND INFLUENZA B VIRUS B/BRISBANE/60/2008 ANTIGEN (FORMALDEHYDE INACTIVATED) 0.5 ML: 15; 15; 15; 15 INJECTION, SUSPENSION INTRAMUSCULAR at 09:54

## 2017-11-20 RX ADMIN — OXYCODONE HYDROCHLORIDE 10 MG: 10 TABLET ORAL at 03:30

## 2017-11-20 RX ADMIN — Medication 100 MG: at 08:04

## 2017-11-20 RX ADMIN — GABAPENTIN 300 MG: 300 CAPSULE ORAL at 20:12

## 2017-11-20 RX ADMIN — ASPIRIN 81 MG: 81 TABLET, CHEWABLE ORAL at 20:11

## 2017-11-20 RX ADMIN — SODIUM CHLORIDE: 9 INJECTION, SOLUTION INTRAVENOUS at 12:44

## 2017-11-20 RX ADMIN — GABAPENTIN 300 MG: 300 CAPSULE ORAL at 08:04

## 2017-11-20 RX ADMIN — SODIUM CHLORIDE: 9 INJECTION, SOLUTION INTRAVENOUS at 21:08

## 2017-11-20 RX ADMIN — OXYCODONE HYDROCHLORIDE 10 MG: 10 TABLET ORAL at 20:10

## 2017-11-20 RX ADMIN — ASPIRIN 81 MG: 81 TABLET, CHEWABLE ORAL at 08:04

## 2017-11-20 RX ADMIN — RISPERIDONE 1 MG: 1 TABLET, FILM COATED ORAL at 08:04

## 2017-11-20 RX ADMIN — PNEUMOCOCCAL 13-VALENT CONJUGATE VACCINE 0.5 ML: 2.2; 2.2; 2.2; 2.2; 2.2; 4.4; 2.2; 2.2; 2.2; 2.2; 2.2; 2.2; 2.2 INJECTION, SUSPENSION INTRAMUSCULAR at 09:54

## 2017-11-20 RX ADMIN — DOXYCYCLINE 100 MG: 100 TABLET ORAL at 08:04

## 2017-11-20 RX ADMIN — THERA TABS 1 TABLET: TAB at 08:04

## 2017-11-20 RX ADMIN — DEXTROSE MONOHYDRATE 500 MG: 50 INJECTION, SOLUTION INTRAVENOUS at 20:13

## 2017-11-20 RX ADMIN — STANDARDIZED SENNA CONCENTRATE AND DOCUSATE SODIUM 2 TABLET: 8.6; 5 TABLET, FILM COATED ORAL at 08:03

## 2017-11-20 RX ADMIN — HYDROMORPHONE HYDROCHLORIDE 0.5 MG: 1 INJECTION, SOLUTION INTRAMUSCULAR; INTRAVENOUS; SUBCUTANEOUS at 16:32

## 2017-11-20 RX ADMIN — DULOXETINE HYDROCHLORIDE 60 MG: 60 CAPSULE, DELAYED RELEASE ORAL at 20:12

## 2017-11-20 RX ADMIN — OMEPRAZOLE 20 MG: 20 CAPSULE, DELAYED RELEASE ORAL at 08:04

## 2017-11-20 RX ADMIN — OXYCODONE HYDROCHLORIDE 10 MG: 10 TABLET ORAL at 12:44

## 2017-11-20 RX ADMIN — DOXYCYCLINE 100 MG: 100 TABLET ORAL at 20:12

## 2017-11-20 RX ADMIN — DEXTROSE MONOHYDRATE 500 MG: 50 INJECTION, SOLUTION INTRAVENOUS at 12:44

## 2017-11-20 RX ADMIN — STANDARDIZED SENNA CONCENTRATE AND DOCUSATE SODIUM 2 TABLET: 8.6; 5 TABLET, FILM COATED ORAL at 20:13

## 2017-11-20 RX ADMIN — ENOXAPARIN SODIUM 40 MG: 100 INJECTION SUBCUTANEOUS at 08:04

## 2017-11-20 RX ADMIN — GABAPENTIN 300 MG: 300 CAPSULE ORAL at 14:22

## 2017-11-20 RX ADMIN — KETOROLAC TROMETHAMINE 15 MG: 30 INJECTION, SOLUTION INTRAMUSCULAR at 00:09

## 2017-11-20 RX ADMIN — KETOROLAC TROMETHAMINE 15 MG: 30 INJECTION, SOLUTION INTRAMUSCULAR at 20:23

## 2017-11-20 RX ADMIN — KETOROLAC TROMETHAMINE 15 MG: 30 INJECTION, SOLUTION INTRAMUSCULAR at 14:22

## 2017-11-20 ASSESSMENT — ENCOUNTER SYMPTOMS
SORE THROAT: 0
CONSTIPATION: 0
FEVER: 0
DIZZINESS: 0
DOUBLE VISION: 1
HEADACHES: 1
VOMITING: 0
DIARRHEA: 0
SPEECH CHANGE: 0
COUGH: 0
SEIZURES: 0
PHOTOPHOBIA: 1
NERVOUS/ANXIOUS: 1
WEAKNESS: 1
DEPRESSION: 0
ABDOMINAL PAIN: 0
SENSORY CHANGE: 0
BLURRED VISION: 1
FOCAL WEAKNESS: 0
NAUSEA: 1
SHORTNESS OF BREATH: 0

## 2017-11-20 ASSESSMENT — COGNITIVE AND FUNCTIONAL STATUS - GENERAL
HELP NEEDED FOR BATHING: A LITTLE
DAILY ACTIVITIY SCORE: 22
SUGGESTED CMS G CODE MODIFIER DAILY ACTIVITY: CJ
TOILETING: A LITTLE

## 2017-11-20 ASSESSMENT — PATIENT HEALTH QUESTIONNAIRE - PHQ9
SUM OF ALL RESPONSES TO PHQ QUESTIONS 1-9: 0
2. FEELING DOWN, DEPRESSED, IRRITABLE, OR HOPELESS: NOT AT ALL
1. LITTLE INTEREST OR PLEASURE IN DOING THINGS: NOT AT ALL
SUM OF ALL RESPONSES TO PHQ9 QUESTIONS 1 AND 2: 0

## 2017-11-20 ASSESSMENT — PAIN SCALES - GENERAL
PAINLEVEL_OUTOF10: 7
PAINLEVEL_OUTOF10: 9
PAINLEVEL_OUTOF10: 9
PAINLEVEL_OUTOF10: 10
PAINLEVEL_OUTOF10: 10
PAINLEVEL_OUTOF10: 8
PAINLEVEL_OUTOF10: 8
PAINLEVEL_OUTOF10: 9
PAINLEVEL_OUTOF10: 10
PAINLEVEL_OUTOF10: 8
PAINLEVEL_OUTOF10: 10

## 2017-11-20 ASSESSMENT — ACTIVITIES OF DAILY LIVING (ADL): TOILETING: INDEPENDENT

## 2017-11-20 ASSESSMENT — LIFESTYLE VARIABLES
TREMOR: NO TREMOR
PAROXYSMAL SWEATS: NO SWEAT VISIBLE
HEADACHE, FULLNESS IN HEAD: NOT PRESENT
ANXIETY: NO ANXIETY (AT EASE)
AUDITORY DISTURBANCES: NOT PRESENT
VISUAL DISTURBANCES: NOT PRESENT
AGITATION: NORMAL ACTIVITY
ORIENTATION AND CLOUDING OF SENSORIUM: ORIENTED AND CAN DO SERIAL ADDITIONS
NAUSEA AND VOMITING: NO NAUSEA AND NO VOMITING
TOTAL SCORE: 0
DO YOU DRINK ALCOHOL: NO

## 2017-11-20 ASSESSMENT — COPD QUESTIONNAIRES
DURING THE PAST 4 WEEKS HOW MUCH DID YOU FEEL SHORT OF BREATH: NONE/LITTLE OF THE TIME
HAVE YOU SMOKED AT LEAST 100 CIGARETTES IN YOUR ENTIRE LIFE: NO/DON'T KNOW
DO YOU EVER COUGH UP ANY MUCUS OR PHLEGM?: NO/ONLY WITH OCCASIONAL COLDS OR INFECTIONS
COPD SCREENING SCORE: 0

## 2017-11-20 NOTE — PROGRESS NOTES
Report called to receiving floor. Patient to be transferred to [facility] via wheelchair

## 2017-11-20 NOTE — CARE PLAN
Problem: Nutritional:  Goal: Achieve adequate nutritional intake  Patient will advance diet and consume >50% of meals   Outcome: MET Date Met: 11/20/17  PO % for last four meals.

## 2017-11-20 NOTE — PROGRESS NOTES
Assumed care of pt; pt aox3, do to time; pt reports pain 8/10 in head, medicated per MAR; pt denies n/t and n/v; pt continent of bowel and bladder; pt tolerating diet, consuming 100% of meal; family at bedside; seizure precautions put in place; VEEG started this am; pnu and flu vaccine ordered, will administer when received; safety precautions in place; all needs met at this time

## 2017-11-20 NOTE — PROGRESS NOTES
Report received at bedside, assumed care. Pt is awake in bed. A&O x 4. Pt c/o chronic pain (see MAR). No other concerns, complains or distress. Tele box on. Chart reviewed and POC updated with patient and  at bedside. Bed in lowest position and call light within reach.

## 2017-11-20 NOTE — CARE PLAN
Problem: Knowledge Deficit  Goal: Knowledge of disease process/condition, treatment plan, diagnostic tests, and medications will improve  Outcome: PROGRESSING AS EXPECTED  Pt. educated on the plan of care and medication regimen. All questions/concerns addressed at this this time.

## 2017-11-20 NOTE — CARE PLAN
Problem: Safety  Goal: Will remain free from falls  Outcome: PROGRESSING AS EXPECTED  Pt. calling approprietly prior to ambulating out of bed.

## 2017-11-20 NOTE — PROGRESS NOTES
2 RN skin check completed. Multiple pinpoint to dime-size ulcer like lesions on bilateral hands, arms and lower extremities. Larger sized lesions covered in dry gauze and transparent film, non-draining. [facility] team aware and appropriate interventions in place. No other skin issues noted at this time.

## 2017-11-20 NOTE — THERAPY
"Occupational Therapy Evaluation completed.   Functional Status:  OT eval completed on 48 YO F admitted for seizures. Pt reports decreased B hand strength secondary to edema. Pt was receiving outpatient OT for 6-8 weeks to increase B hand strength and reduce edema. Pt reports difficulty with UB dressing due to decreased B hand strength. Pt is functioning below PLOF due to generalized weakness and would benefit from acute OT services in order to increase independence in ADLs and functional transfers.  Plan of Care: Will benefit from Occupational Therapy 2 times per week  Discharge Recommendations:  Equipment: Will Continue to Assess for Equipment Needs. Post-acute therapy Discharge to a transitional care facility for continued skilled therapy services. and Discharge to home with outpatient or home health for additional skilled therapy services. D/C dependent on progress pt makes while in acute care.    See \"Rehab Therapy-Acute\" Patient Summary Report for complete documentation.    "

## 2017-11-20 NOTE — DISCHARGE PLANNING
Pulled patient's Advance Directive off of scanning. Unfortunately we are missing pages making document invalid. Left message for Latasha, , to obtain Certificate of Competency.

## 2017-11-20 NOTE — THERAPY
Physical Therapy Contact Note    Pt getting EEG placed upon attempt, will follow back as appropriate;    Lisset Sauer, PT, DPT Pager: [phone]

## 2017-11-20 NOTE — CARE PLAN
Problem: Safety  Goal: Will remain free from falls    Intervention: Implement fall precautions  Pt sba, safety precautions in place      Problem: Pain Management  Goal: Pain level will decrease to patient's comfort goal    Intervention: Follow pain managment plan developed in collaboration with patient and Interdisciplinary Team  Pain meds administered without positive results

## 2017-11-20 NOTE — PROGRESS NOTES
Neurology Follow Up       CC: Seizures         HPI:    46 yo F with pmhx of migraine headaches, pseudotumor cerebri s/p  shunt 5 months ago presented to [facility] and transferred to [facility] for seizures. She was initially evaluated by Dr.'s Ramos and Bacilio. The episodes were witnessed by her  and he took video of them and they appeared to involve mainly left sided shaking with decreased consciousness. They lasted anywhere from 30 seconds to 3 minutes. She was started on Keppra. Her EEG from 11/17/17 showed diffuse cerebral dysfunction suggestive of encephalopathy. She is now on 24 hour EEG monitoring but has not had any additional episodes. She has been having a continued headache and told she cannot have Benadryl because it would interfere with her EEG. Toradol alone has not been helping.            ROS:   Constitutional: + low grade fevers - being investigated by Rheumatologist.  Eyes: + blurry vision, no eye pain.  ENT: No dysphagia or hearing loss.  Respiratory: No cough or shortness of breath.  Cardiovascular: No chest pain or palpitations.  GI: No nausea, vomiting, or diarrhea.  : No urinary incontinence or dysuria.  Musculoskeletal: No joint swelling or arthralgias.  Skin: + biopsied skin lesions on her right leg ?Autoimmune per Rheum.  Neuro: + headaches, no dizziness, or tremors.  Endocrine: No heat or cold intolerance. No polydipsia or polyuria.  Psych: No depression or anxiety.  Heme/Lymph: No easy bruising or swollen lymph nodes.  All other review of systems were reviewed and were negative.     Past Medical History:   Past Medical History:   Diagnosis Date   • Anemia 10/05/2017    Unsure if a current issue.   • Allergy, unspecified not elsewhere classified    • Anxiety    • autoimmune    • Depression    • H/O Clostridium difficile infection    • Hx MRSA infection    • Hydrocephalus     with lumbar shunt   • Migraine with aura, with intractable migraine, so  stated, without mention of status migrainosus    • MRSA (methicillin resistant Staphylococcus aureus)    • Pituitary abnormality (CMS-HCC)    • Staph infection    • Stroke (CMS-HCC)     2007       Past Surgical History:   Past Surgical History:   Procedure Laterality Date   •  SHUNT INSERTION  5/31/2017    Procedure:  SHUNT INSERTION;  Surgeon: Drew Berry M.D.;  Location: SURGERY [facility];  Service:    • SPINAL CORD STIMULATOR  12/19/2016    Procedure: SPINAL CORD STIMULATOR FOR: PLACEMENT OF LEFT FLANK OCCIPITAL NERVE STIMULATOR BATTERY PACK;  Surgeon: Oli Oh III, M.D.;  Location: SURGERY [facility];  Service:    • LUMBAR EXPLORATION N/A 8/31/2015    Procedure: LUMBAR EXPLORATION- Lumbar shunt removal ;  Surgeon: Oli Oh III, M.D.;  Location: SURGERY [facility];  Service:    • IRRIGATION & DEBRIDEMENT NEURO N/A 6/28/2015    Procedure: IRRIGATION & DEBRIDEMENT NEURO;  Surgeon: Oli Oh III, M.D.;  Location: SURGERY [facility];  Service:    • APPENDECTOMY     • CHOLECYSTECTOMY     • OTHER      right knee surgery   • OTHER NEUROLOGICAL SURG      occipital nerve stimulator   • TONSILLECTOMY     • TUBAL LIGATION         Social History:   Social History     Social History   • Marital status:      Spouse name: N/A   • Number of children: N/A   • Years of education: N/A     Occupational History   •       on disability     Social History Main Topics   • Smoking status: Never Smoker   • Smokeless tobacco: Never Used   • Alcohol use Yes      Comment: Rare   • Drug use: No   • Sexual activity: Not on file     Other Topics Concern   • Not on file     Social History Narrative   • No narrative on file       Family Hx:   Family History   Problem Relation Age of Onset   • No Known Problems Mother    • No Known Problems Father        Current Medications:   Current Facility-Administered Medications:   •  oxycodone immediate release (ROXICODONE) tablet 10 mg, 10  mg, Oral, Q4HRS PRN, Abby Márquez M.D., 10 mg at 11/20/17 1244  •  ketorolac (TORADOL) injection 15 mg, 15 mg, Intravenous, Q6HRS PRN, Abby Márquez M.D., 15 mg at 11/20/17 0009  •  predniSONE (DELTASONE) tablet 10 mg, 10 mg, Oral, DAILY, Mary Taveras M.D., 10 mg at 11/20/17 0804  •  aspirin (ASA) chewable tab 81 mg, 81 mg, Oral, BID, Juan Miguel Rosales M.D., 81 mg at 11/20/17 0804  •  duloxetine (CYMBALTA) capsule 60 mg, 60 mg, Oral, BID, Juan Miguel Rosales M.D., 60 mg at 11/20/17 0804  •  gabapentin (NEURONTIN) capsule 300 mg, 300 mg, Oral, TID, Juan Miguel Rosales M.D., 300 mg at 11/20/17 0804  •  multivitamin (THERAGRAN) tablet 1 Tab, 1 Tab, Oral, DAILY, Juan Miguel Rosales M.D., 1 Tab at 11/20/17 0804  •  omeprazole (PRILOSEC) capsule 20 mg, 20 mg, Oral, DAILY, Juan Miguel Rosales M.D., 20 mg at 11/20/17 0804  •  doxycycline monohydrate (ADOXA) tablet 100 mg, 100 mg, Oral, Q12HRS, Juan Miguel Rosales M.D., 100 mg at 11/20/17 0804  •  risperidone (RISPERDAL) tablet 1 mg, 1 mg, Oral, BID, Juan Miguel Rosales M.D., 1 mg at 11/20/17 0804  •  NS infusion, , Intravenous, Continuous, Juan Miguel Rosales M.D., Last Rate: 125 mL/hr at 11/20/17 1244  •  senna-docusate (PERICOLACE or SENOKOT S) 8.6-50 MG per tablet 2 Tab, 2 Tab, Oral, BID, 2 Tab at 11/20/17 0803 **AND** polyethylene glycol/lytes (MIRALAX) PACKET 1 Packet, 1 Packet, Oral, QDAY PRN **AND** magnesium hydroxide (MILK OF MAGNESIA) suspension 30 mL, 30 mL, Oral, QDAY PRN **AND** bisacodyl (DULCOLAX) suppository 10 mg, 10 mg, Rectal, QDAY PRN, Juan Miguel Rosales M.D.  •  Respiratory Care per Protocol, , Nebulization, Continuous RT, Juan Miguel Rosales M.D.  •  enoxaparin (LOVENOX) inj 40 mg, 40 mg, Subcutaneous, DAILY, Juan Miguel Rosales M.D., 40 mg at 11/20/17 0804  •  labetalol (NORMODYNE,TRANDATE) injection 10 mg, 10 mg, Intravenous, Q4HRS PRN, Juan Miguel Rosales M.D.  •  ondansetron (ZOFRAN) syringe/vial injection 4 mg, 4 mg, Intravenous, Q4HRS PRN,  "Juan Miguel Rosales M.D.  •  ondansetron (ZOFRAN ODT) dispertab 4 mg, 4 mg, Oral, Q4HRS PRN, Juan Miguel Rosales M.D.  •  lorazepam (ATIVAN) injection 4 mg, 4 mg, Intravenous, Q10 MIN PRN, Ashlee Lee M.D.  •  thiamine (THIAMINE) tablet 100 mg, 100 mg, Oral, DAILY, Mary Taveras M.D., 100 mg at 11/20/17 0804  •  levetiracetam (KEPPRA) 500 mg in D5W 100 mL IVPB, 500 mg, Intravenous, Q12HRS, CASSI Ramos M.D., Last Rate: 400 mL/hr at 11/20/17 1244, 500 mg at 11/20/17 1244    Allergies:   Allergies   Allergen Reactions   • Prochlorperazine Swelling     Tongue swelling. Reaction as a teen. (compazine)  Tolerated Phenergan on 02/24/15   • Sumatriptan Unspecified     Historical=\"Heart stops.\" Reaction  between 1995 to 1997   • Vancomycin Shortness of Breath and Rash     Reaction in 2005.  D/W patient 8/31/15 - tolerated loading dose of vancomycin administered on 8/30/15 with some itching to chest with decreased infusion rate. Red Man Syndrome         Physical Exam:   Ambulatory Vitals  Vitals  Blood Pressure: 107/55  Temperature: 36.8 °C (98.2 °F)  Temp src: Temporal  Pulse: 77  Respiration: 16  Pulse Oximetry: 98 %  Height: 160 cm (5' 2.99\")  Weight: 74.3 kg (163 lb 12.8 oz)  Encounter Vitals  Temperature: 36.8 °C (98.2 °F)  Temp Source: Temporal  Blood Pressure: 107/55  BP Location: Left, Upper Arm  Patient BP Position: Supine  Pulse: 77  Respiration: 16  Pulse Oximetry: 98 %  O2 (LPM): 0  O2 Delivery: None (Room Air)  Weight: 74.3 kg (163 lb 12.8 oz)  How Weight Obtained: Bed Scale  Reason weight was either estimated or stated: Non-weight bearing  Height: 160 cm (5' 2.99\")  BMI (Calculated): 28.87  Location: Head  Description: Aching  Breastfeeding Status: No      Constitutional: Well-developed, well-nourished, good hygiene. Appears stated age.  Cardiovascular: RRR, with no murmurs, rubs or gallops. No carotid bruits. No peripheral edema, pedal pulses intact.   Respiratory: Lungs CTA B/L, no W/R/R.   Skin: Warm, " dry, intact. No rashes observed.  Eyes:    Funduscopic: Optic discs flat with no evidence of papilledema or pallor. Normal posterior segments.  Neurologic:   Mental Status: Awake, alert, oriented x 3.   Speech: Fluent with normal prosody.   Memory: Able to recall 3 words at 1 minute and 5 minutes. Able to recall recent and remote events accurately.    Concentration: Attentive. Able to focus on history and follow multi-step commands.   Fund of Knowledge: Appropriate.   Cranial Nerves:    CN II: Left APD.    CN III: Good eye closure, EOMI.     CN IV: EOMI    CN V: Facial sensation intact and symmetric.     CN VI: EOMI    CN VII: No facial asymmetry.     CN VIII: Hearing intact.     CN IX and X: Palate elevates symmetrically. Normal gag reflex.    CN XI: Symmetric shoulder shrug.     CN XII: Tongue midline.    Sensory: Intact light touch, vibration and temperature.    Coordination: No evidence of past-pointing on finger to nose testing, no dysdiadochokinesia. Heel to shin intact.    DTR's: 2+ throughout without clonus.    Babinski: Toes downgoing bilaterally.   Romberg: Negative.   Movements: No tremors observed.   Musculoskeletal:    Strength: 5/5 in upper and lower extremities bilaterally.   Gait: Steady, narrow based.    Tone: Normal bulk and tone.   Joints: No swelling.     EEG from 10/13/17:  This scalp EEG denotes nonspecific cortical dysfunction - not localizing well-- ( more over right)   There are no definite epileptiform but Interictal or Post-ictal remains possible    EEG from 11/17/17   This is an abnormal video EEG recording in the awake, drowsy, and sleep state(s). Mild to moderate diffuse cerebral dysfunction, suggestive of an encephalopathic state. Clinical correlation is recommended.         Imaging:     Noncontrast Head CT from 11/17/17  FINDINGS:  Right frontal  shunt remains in place with tip crossing the midline of the anterior horns with the tip in the left lateral ventricle in unchanged  position. Ventricle size appears stable. No hydrocephalus. Apparent stimulator leads noted in the   occipital scalp bilaterally.  There is no evidence of intracranial hemorrhage, midline shift or mass effect. No extra-axial fluid collection identified.  Paranasal sinuses in the field of view are unremarkable.  Mastoids in the field of view are unremarkable.      Seizures (CMS-HCC)  - Patient experienced multiple episodes with seizure-like activity as recorded by   - Based on videos appears to have been focal seizure evolving to a bilateral tonic-clonic seizure   - EEG on 11/2017 showed nonspecific cortical dysfunction, recommended prolonged outpatient EEG monitoring  - CT head showed no acute abnormalities  - Patient started on Keppra, will continue Neurotin  - CPK : WNL, started on IV fluids  - Seizure/Aspiration precautions, Q4H Neuro checks   - Neurology on board. EEG on 11/17/2017 showed '''An abnormal video EEG recording in the awake, drowsy, and sleep state. Mild to moderate diffuse cerebral dysfunction, suggestive of an encephalopathic state.''  -Per Neuro, continue Keppra but decrease the dose to 500mg bid  -Scheduled for 24 hr EEG on Monday 11/20/2017. Spinal Tap next week.    Chronic migraine  - Patient has history of chronic, debilitating migraines since age 14  - S/p occipital nerve stimulator placed in 2003  - Continue home meds    Pseudotumor cerebri  - Patient has history of pseudotumor cerebri status post  shunt placed approximately 5 months ago  - Currently well-controlled, continue monitor  - considering acetazolamide if it is getting worse    Seronegative rheumatoid arthritis (CMS-HCC)  - Patient has history of seronegative rheumatoid arthritis  - Currently following rheumatology, failed trial of methotrexate due to elevated liver enzymes  - denied joint pain for this admission  -Unlikely flare up of her sero neagative RA ( ESR 19, C reactive protein 0.27, ferritin 21).  -continue home  Prednisone  -Will call her rheumatologist for records.  ordered TPO AB, SSA , SSB antibodoes. Will follow those results.   - Will continue to monitor    Wounds, multiple  - Patient has history of multiple ulcer like wounds on hands and lower legs  - Currently following up with rheumatology and hematology for possible hypercoagulability or autoimmune disease  - We will continue to follow, no signs of infection or indication for a wound care consult at this time    Opiate withdrawal (CMS-[facility])  - per pt & , pt is taking roxicodone 10 mg Q4H x 9 months  - Pt was shaking, anxious, tearful, screaming at the staffs.   - UDS negative on admission  - continue her home medication        Plan:   Continue 24 hour EEG monitoring  Continue Keppra for now  LP later this week       Cathy Siegel DO

## 2017-11-20 NOTE — PROGRESS NOTES
Pt writhing and crying in headache pain, MD notified and stated she will call me back after she makes some calls, will wait for return call    MD to bedside to assess pt; new orders for Dilaudid 0.5mg, one time;     Pt currently in room, resting in bed, pain med administered

## 2017-11-21 PROCEDURE — 700111 HCHG RX REV CODE 636 W/ 250 OVERRIDE (IP): Performed by: SPECIALIST

## 2017-11-21 PROCEDURE — 770006 HCHG ROOM/CARE - MED/SURG/GYN SEMI*

## 2017-11-21 PROCEDURE — A9270 NON-COVERED ITEM OR SERVICE: HCPCS | Performed by: PSYCHIATRY & NEUROLOGY

## 2017-11-21 PROCEDURE — 700105 HCHG RX REV CODE 258: Performed by: SPECIALIST

## 2017-11-21 PROCEDURE — 700102 HCHG RX REV CODE 250 W/ 637 OVERRIDE(OP): Performed by: INTERNAL MEDICINE

## 2017-11-21 PROCEDURE — 700105 HCHG RX REV CODE 258: Performed by: STUDENT IN AN ORGANIZED HEALTH CARE EDUCATION/TRAINING PROGRAM

## 2017-11-21 PROCEDURE — A9270 NON-COVERED ITEM OR SERVICE: HCPCS | Performed by: STUDENT IN AN ORGANIZED HEALTH CARE EDUCATION/TRAINING PROGRAM

## 2017-11-21 PROCEDURE — 700111 HCHG RX REV CODE 636 W/ 250 OVERRIDE (IP): Performed by: INTERNAL MEDICINE

## 2017-11-21 PROCEDURE — 99233 SBSQ HOSP IP/OBS HIGH 50: CPT | Mod: GC | Performed by: HOSPITALIST

## 2017-11-21 PROCEDURE — 700111 HCHG RX REV CODE 636 W/ 250 OVERRIDE (IP): Performed by: PSYCHIATRY & NEUROLOGY

## 2017-11-21 PROCEDURE — 700102 HCHG RX REV CODE 250 W/ 637 OVERRIDE(OP): Performed by: STUDENT IN AN ORGANIZED HEALTH CARE EDUCATION/TRAINING PROGRAM

## 2017-11-21 PROCEDURE — 700111 HCHG RX REV CODE 636 W/ 250 OVERRIDE (IP): Performed by: STUDENT IN AN ORGANIZED HEALTH CARE EDUCATION/TRAINING PROGRAM

## 2017-11-21 PROCEDURE — 700102 HCHG RX REV CODE 250 W/ 637 OVERRIDE(OP): Performed by: PSYCHIATRY & NEUROLOGY

## 2017-11-21 PROCEDURE — A9270 NON-COVERED ITEM OR SERVICE: HCPCS | Performed by: INTERNAL MEDICINE

## 2017-11-21 RX ORDER — ASPIRIN 81 MG/1
81 TABLET, CHEWABLE ORAL DAILY
Status: DISCONTINUED | OUTPATIENT
Start: 2017-11-22 | End: 2017-11-22 | Stop reason: HOSPADM

## 2017-11-21 RX ORDER — DIVALPROEX SODIUM 500 MG/1
500 TABLET, EXTENDED RELEASE ORAL 2 TIMES DAILY
Status: DISCONTINUED | OUTPATIENT
Start: 2017-11-24 | End: 2017-11-22 | Stop reason: HOSPADM

## 2017-11-21 RX ORDER — DIPHENHYDRAMINE HYDROCHLORIDE 50 MG/ML
25 INJECTION INTRAMUSCULAR; INTRAVENOUS EVERY 6 HOURS
Status: DISCONTINUED | OUTPATIENT
Start: 2017-11-21 | End: 2017-11-22 | Stop reason: HOSPADM

## 2017-11-21 RX ORDER — DIHYDROERGOTAMINE MESYLATE 1 MG/ML
1 INJECTION, SOLUTION INTRAMUSCULAR; INTRAVENOUS; SUBCUTANEOUS ONCE
Status: COMPLETED | OUTPATIENT
Start: 2017-11-21 | End: 2017-11-21

## 2017-11-21 RX ORDER — DIVALPROEX SODIUM 500 MG/1
500 TABLET, EXTENDED RELEASE ORAL DAILY
Status: DISCONTINUED | OUTPATIENT
Start: 2017-11-21 | End: 2017-11-21

## 2017-11-21 RX ORDER — DIVALPROEX SODIUM 500 MG/1
500 TABLET, EXTENDED RELEASE ORAL 3 TIMES DAILY
Status: DISCONTINUED | OUTPATIENT
Start: 2017-11-27 | End: 2017-11-22 | Stop reason: HOSPADM

## 2017-11-21 RX ORDER — DIVALPROEX SODIUM 500 MG/1
500 TABLET, EXTENDED RELEASE ORAL EVERY EVENING
Status: DISCONTINUED | OUTPATIENT
Start: 2017-11-21 | End: 2017-11-22 | Stop reason: HOSPADM

## 2017-11-21 RX ADMIN — RISPERIDONE 1 MG: 1 TABLET, FILM COATED ORAL at 09:02

## 2017-11-21 RX ADMIN — OXYCODONE HYDROCHLORIDE 10 MG: 10 TABLET ORAL at 00:12

## 2017-11-21 RX ADMIN — KETOROLAC TROMETHAMINE 15 MG: 30 INJECTION, SOLUTION INTRAMUSCULAR at 09:03

## 2017-11-21 RX ADMIN — OMEPRAZOLE 20 MG: 20 CAPSULE, DELAYED RELEASE ORAL at 09:02

## 2017-11-21 RX ADMIN — THERA TABS 1 TABLET: TAB at 09:02

## 2017-11-21 RX ADMIN — RISPERIDONE 1 MG: 1 TABLET, FILM COATED ORAL at 21:09

## 2017-11-21 RX ADMIN — KETOROLAC TROMETHAMINE 15 MG: 30 INJECTION, SOLUTION INTRAMUSCULAR at 02:26

## 2017-11-21 RX ADMIN — PREDNISONE 10 MG: 10 TABLET ORAL at 09:02

## 2017-11-21 RX ADMIN — DULOXETINE HYDROCHLORIDE 60 MG: 60 CAPSULE, DELAYED RELEASE ORAL at 21:09

## 2017-11-21 RX ADMIN — ENOXAPARIN SODIUM 40 MG: 100 INJECTION SUBCUTANEOUS at 09:03

## 2017-11-21 RX ADMIN — DOXYCYCLINE 100 MG: 100 TABLET ORAL at 09:02

## 2017-11-21 RX ADMIN — GABAPENTIN 300 MG: 300 CAPSULE ORAL at 14:59

## 2017-11-21 RX ADMIN — OXYCODONE HYDROCHLORIDE 10 MG: 10 TABLET ORAL at 05:17

## 2017-11-21 RX ADMIN — OXYCODONE HYDROCHLORIDE 10 MG: 10 TABLET ORAL at 10:41

## 2017-11-21 RX ADMIN — Medication 100 MG: at 09:02

## 2017-11-21 RX ADMIN — STANDARDIZED SENNA CONCENTRATE AND DOCUSATE SODIUM 2 TABLET: 8.6; 5 TABLET, FILM COATED ORAL at 09:02

## 2017-11-21 RX ADMIN — DIPHENHYDRAMINE HYDROCHLORIDE 25 MG: 50 INJECTION, SOLUTION INTRAMUSCULAR; INTRAVENOUS at 18:09

## 2017-11-21 RX ADMIN — DIVALPROEX SODIUM 500 MG: 500 TABLET, EXTENDED RELEASE ORAL at 21:08

## 2017-11-21 RX ADMIN — SODIUM CHLORIDE: 9 INJECTION, SOLUTION INTRAVENOUS at 05:16

## 2017-11-21 RX ADMIN — DIPHENHYDRAMINE HYDROCHLORIDE 25 MG: 50 INJECTION, SOLUTION INTRAMUSCULAR; INTRAVENOUS at 11:30

## 2017-11-21 RX ADMIN — DEXTROSE MONOHYDRATE 500 MG: 50 INJECTION, SOLUTION INTRAVENOUS at 09:02

## 2017-11-21 RX ADMIN — OXYCODONE HYDROCHLORIDE 10 MG: 10 TABLET ORAL at 21:08

## 2017-11-21 RX ADMIN — ASPIRIN 81 MG: 81 TABLET, CHEWABLE ORAL at 09:02

## 2017-11-21 RX ADMIN — DIPHENHYDRAMINE HYDROCHLORIDE 25 MG: 50 INJECTION, SOLUTION INTRAMUSCULAR; INTRAVENOUS at 23:36

## 2017-11-21 RX ADMIN — DEXTROSE MONOHYDRATE 500 MG: 50 INJECTION, SOLUTION INTRAVENOUS at 21:09

## 2017-11-21 RX ADMIN — OXYCODONE HYDROCHLORIDE 10 MG: 10 TABLET ORAL at 14:59

## 2017-11-21 RX ADMIN — DULOXETINE HYDROCHLORIDE 60 MG: 60 CAPSULE, DELAYED RELEASE ORAL at 09:02

## 2017-11-21 RX ADMIN — GABAPENTIN 300 MG: 300 CAPSULE ORAL at 21:08

## 2017-11-21 RX ADMIN — DIHYDROERGOTAMINE MESYLATE 1 MG: 1 INJECTION, SOLUTION INTRAMUSCULAR; INTRAVENOUS; SUBCUTANEOUS at 11:32

## 2017-11-21 RX ADMIN — KETOROLAC TROMETHAMINE 15 MG: 30 INJECTION, SOLUTION INTRAMUSCULAR at 18:36

## 2017-11-21 RX ADMIN — GABAPENTIN 300 MG: 300 CAPSULE ORAL at 09:02

## 2017-11-21 ASSESSMENT — PAIN SCALES - GENERAL
PAINLEVEL_OUTOF10: 10
PAINLEVEL_OUTOF10: 9
PAINLEVEL_OUTOF10: 7
PAINLEVEL_OUTOF10: 6
PAINLEVEL_OUTOF10: 0
PAINLEVEL_OUTOF10: 10
PAINLEVEL_OUTOF10: 8
PAINLEVEL_OUTOF10: 8
PAINLEVEL_OUTOF10: 10
PAINLEVEL_OUTOF10: 10
PAINLEVEL_OUTOF10: 7
PAINLEVEL_OUTOF10: 9
PAINLEVEL_OUTOF10: 9
PAINLEVEL_OUTOF10: 8
PAINLEVEL_OUTOF10: 10

## 2017-11-21 ASSESSMENT — ENCOUNTER SYMPTOMS
CONSTIPATION: 0
SPUTUM PRODUCTION: 0
DOUBLE VISION: 0
SORE THROAT: 0
ABDOMINAL PAIN: 0
VOMITING: 0
WEAKNESS: 1
MYALGIAS: 0
DIARRHEA: 0
NAUSEA: 1
BLURRED VISION: 0
SPEECH CHANGE: 0
DIZZINESS: 0
SEIZURES: 0
NERVOUS/ANXIOUS: 1
SHORTNESS OF BREATH: 0
COUGH: 0
HEADACHES: 1
SENSORY CHANGE: 0
FOCAL WEAKNESS: 0
FEVER: 0

## 2017-11-21 NOTE — CARE PLAN
Problem: Pain Management  Goal: Pain level will decrease to patient's comfort goal    Intervention: Follow pain managment plan developed in collaboration with patient and Interdisciplinary Team  Educated patient to plan of care Re: managing pain with Toradol and Oxycodone and not Dilaudid. Patient dissatisfied at first, but coming to accept plan of care.

## 2017-11-21 NOTE — PROGRESS NOTES
Internal Medicine Interval Note  Note Author: Abby Márquez M.D.     Name Enedelia Pang     1970   Age/Sex 47 y.o. female   MRN [id number]   Code Status FULL     After 5PM or if no immediate response to page, please call for cross-coverage  Attending/Team:  /Petar See Patient List for primary contact information  Call [phone] to page    1st Call - Day Intern (R1):    2nd Call - Day Sr. Resident (R2/R3):            Reason for interval visit  (Principal Problem)   Seizures (CMS-HCC)    Interval Problem Daily Status Update  (24 hours)     Pt was seen & examined at bedside. Pt looks anxious & fatigue, but more awake & alert compared to admission day. Pt is being monitored for 24 hr EEG on AM rounds.  at Bedside. Pt still complaints of terrible headache, 7-8/10 in frontal area, Improved compared to admission day. CT showed no acute abnormalities. D/C all medications that can lower seizure threshold especially benadryl. Yesterday, Pt was asking Roxicodone 10 mg Q4H, stating that she has withdrawal symptoms. UDS negative on admission. Pt was shaking, anxious, tearful, screaming at the staffs. Today, pt stated that she has terrible headache & crying , asking for Dilaudid specifically. Given one time low dose Dilaudid was given.      Neurology on board. EEG repeated on 2017 showed ''An abnormal video EEG recording in the awake, drowsy, and sleep state. Mild to moderate diffuse cerebral dysfunction, suggestive of an encephalopathic state.'' 24 hr EEG done on 2017. Will follow the result. Spinal Tap later in the  week. Continue Keppra  500mg bid.      History of sero neagative RA ( on admission, ESR 19, C reactive protein 0.27, ferritin 21). Rheumatologist's records reviewed.  ordered TPO AB, SSA , SSB antibodies. Will follow those results.     OT recommended SNF or Home Health for Discharge Planning.        Review of Systems   Constitutional:  Positive for malaise/fatigue. Negative for fever.   HENT: Negative for congestion and sore throat.    Eyes: Positive for blurred vision, double vision and photophobia.   Respiratory: Negative for cough and shortness of breath.    Cardiovascular: Negative for chest pain and leg swelling.   Gastrointestinal: Positive for nausea. Negative for abdominal pain, constipation, diarrhea and vomiting.   Genitourinary: Negative for dysuria.   Neurological: Positive for weakness and headaches. Negative for dizziness, sensory change, speech change, focal weakness and seizures.   Psychiatric/Behavioral: Negative for depression. The patient is nervous/anxious.        Consultants/Specialty  Neurology    Disposition  Inpatient     Quality Measures    Reviewed items::  Medications reviewed and Labs reviewed  Lopez catheter::  No Lopez  DVT prophylaxis pharmacological::  Enoxaparin (Lovenox)  Antibiotics:  Treating active infection/contamination beyond 24 hours perioperative coverage          Physical Exam       Vitals:    11/20/17 0400 11/20/17 0800 11/20/17 1200 11/20/17 1600   BP: 125/75 107/55 117/68 148/70   Pulse: 84 77 85 90   Resp: 12 16 14 (!) 22   Temp: 36.6 °C (97.8 °F) 36.8 °C (98.2 °F) 36.9 °C (98.4 °F) 36.9 °C (98.4 °F)   TempSrc:       SpO2: 97% 98% 98% 94%   Weight:       Height:         Body mass index is 29.02 kg/m². Weight: 74.3 kg (163 lb 12.8 oz)  Oxygen Therapy:  Pulse Oximetry: 94 %, O2 (LPM): 2, O2 Delivery: Nasal Cannula    Physical Exam   Constitutional: She is oriented to person, place, and time and well-developed, well-nourished, and in no distress.   HENT:   Head: Normocephalic and atraumatic.   Eyes: Conjunctivae and EOM are normal. Pupils are equal, round, and reactive to light.   Neck: Normal range of motion. Neck supple.   Cardiovascular: Normal rate, regular rhythm and normal heart sounds.    Pulmonary/Chest: Effort normal and breath sounds normal.   Abdominal: Soft. Bowel sounds are normal.    Musculoskeletal: Normal range of motion.   Skin Ulcers noted in upper & lower extremities, not inflamed, no Erythema or Discharge noted.      Neurological: She is alert and oriented to person, place, and time.   Motor 5/5. Sensation & CN 1-12 grossly intact. DTRs 2+ symmetrical.   Skin: Skin is warm.   Psychiatric: Affect normal.         Lab Data Review:         11/20/2017  5:38 PM    Recent Labs      11/18/17 0430   SODIUM  140   POTASSIUM  4.0   CHLORIDE  107   CO2  28   BUN  7*   CREATININE  0.53   MAGNESIUM  1.7   PHOSPHORUS  4.1   CALCIUM  8.7       Recent Labs      11/18/17 0430   ALTSGPT  46   ASTSGOT  28   ALKPHOSPHAT  93   TBILIRUBIN  0.4   GLUCOSE  86       Recent Labs      11/18/17 0430 11/20/17   0300   RBC  3.78*  3.77*   HEMOGLOBIN  11.7*  11.7*   HEMATOCRIT  36.9*  35.5*   PLATELETCT  262  292   PROTHROMBTM  14.1   --    INR  1.12   --    FERRITIN  21.0   --        Recent Labs      11/18/17 0430 11/20/17   0300   WBC  4.4*  6.0   NEUTSPOLYS  66.30  62.80   LYMPHOCYTES  24.50  28.00   MONOCYTES  6.90  7.70   EOSINOPHILS  0.90  0.50   BASOPHILS  0.90  0.80   ASTSGOT  28   --    ALTSGPT  46   --    ALKPHOSPHAT  93   --    TBILIRUBIN  0.4   --            Assessment/Plan     * Seizures (CMS-[facility])- (present on admission)   Assessment & Plan    - Patient experienced multiple episodes with seizure-like activity as recorded by   - Based on videos appears to have been focal seizure evolving to a bilateral tonic-clonic seizure   - EEG on 11/2017 showed nonspecific cortical dysfunction, recommended prolonged outpatient EEG monitoring  - CT head showed no acute abnormalities  - Patient started on Keppra, will continue Neurotin  - CPK : WNL, started on IV fluids  - Seizure/Aspiration precautions, Q4H Neuro checks   - Neurology on board. EEG on 11/17/2017 showed '''An abnormal video EEG recording in the awake, drowsy, and sleep state. Mild to moderate diffuse cerebral dysfunction, suggestive of an  encephalopathic state.''  -Per Neuro, continue Keppra 500mg bid  -24 hr EEG done on Monday 11/20/2017. Spinal Tap later in the week.        Opiate withdrawal (CMS-HCC)   Assessment & Plan    - per pt & , pt is taking roxicodone 10 mg Q4H x 9 months  - Pt was shaking, anxious, tearful, screaming at the staffs, stating that she is withdrawing from roxicodone  - UDS negative on admission  - continue her home medication            Pseudotumor cerebri- (present on admission)   Assessment & Plan    - Patient has history of pseudotumor cerebri status post  shunt placed approximately 5 months ago  - Currently well-controlled, continue monitor  - considering acetazolamide if it is getting worse        Chronic migraine- (present on admission)   Assessment & Plan    - Patient has history of chronic, debilitating migraines since age 14  - S/p occipital nerve stimulator placed in 2003  - Continue home meds        Seronegative rheumatoid arthritis (CMS-HCC)- (present on admission)   Assessment & Plan    - Patient has history of seronegative rheumatoid arthritis  - Currently following rheumatology, failed trial of methotrexate due to elevated liver enzymes  - denied joint pain for this admission  -Unlikely flare up of her sero neagative RA ( ESR 19, C reactive protein 0.27, ferritin 21).  -continue home Prednisone  -  ordered TPO AB, SSA , SSB antibodoes. Will follow those results.   - Will continue to monitor        Wounds, multiple- (present on admission)   Assessment & Plan    - Patient has history of multiple ulcer like wounds on hands and lower legs  - Currently following up with rheumatology and hematology for possible hypercoagulability or autoimmune disease  - We will continue to follow, no signs of infection or indication for a wound care consult at this time

## 2017-11-21 NOTE — PROGRESS NOTES
Assumed care of pt; pt aox4; pt sitting in bed, eating breakfast having normal conversation however, reports pain 10/10, medicated per MAR; pt ans  requesting dilaudid for pain, pt and  stated that md stated they will add one dose this afternoon; pt showered this am without any incident; safety precautions in place; iv keppra infusing; all needs met at this time

## 2017-11-21 NOTE — PROGRESS NOTES
Patient requesting extra dose of Dilaudid at beginning of shift. Nonpharmacologic pain intervention such as massage from myself and ice from  employed instead. Restlessness and tearfulness resolve, but patient consistently rates pain to head 9-10/10.

## 2017-11-21 NOTE — EEG PROGRESS NOTE
VIDEO ELECTROENCEPHALOGRAM / EPILEPSY MONITORING UNIT REPORT      Referring provider: Dr. Siegel.    DOS:   11/20/2017 - 11/21/2017 (total recording for 20 hrs and 31 minutes).       INDICATION:  Enedelia Pang 47 y.o. female presenting with seizure like spells.      TECHNIQUE: A 30-channel, extended video electroencephalogram (VEEG) was performed in accordance with the international 10-20 system. This digital study was reviewed in bipolar and referential montages. The recording examined the patient during wakeful, drowsy and sleep states.       DESCRIPTION OF THE RECORD:  During the awake state, background shows symmetrical 10 Hz alpha activity posteriorly with amplitude of 70 mV.  There was reactivity with eye opening/closure.  Normal anterior-posterior gradient was noted with faster beta frequencies seen anteriorly.  During drowsiness, high-amplitude delta frequencies were seen.    During the sleep state, background shows diffuse high-amplitude 4-5 Hz delta activity.  Symmetrical high-amplitude sleep spindles and vertex sharp activities were seen in the central leads.    ACTIVATION PROCEDURES:   Not performed.     ICTAL AND/OR INTERICTAL FINDINGS:   No focal or generalized epileptiform activity was noted. No regional slowing was seen during this study.  No seizures were recorded during the study.     EVENT(S):    One typical event was reported by event push button on 11/20/2017 @ 3:40 pm. Patient wakes up from sleep around 3 pm, starts crying, moaning, appears uncomfortable, states she has a bad headache. Alternates from lying down in bed, sitting up in bed with legs crossed, rocking herself in bed, standing out of bed, kneeling by the bed, crying most of the time. Family member in the room. Around 3:40 pm, while standing by the bed and asking nurse for medication for pain, she starts stuttering, then starts shaking (not jerks) diffusely and  behind her holds her as she is falling backwards, he places her  in bed, where immediately she continues to cry (louder) and appears interactive asking for help and medication for pain. The shaking spell was brief, for about 15 seconds or so. Family stated to the EEG technician that the spell captured was the typical shaking spell in question. Other than motion and muscle artifact, the eeg showed a normal, awake background in the posterior leads (before, during, and after the shaking spell). The event was psychogenic in nature.       EKG: sampling review of EKG recording demonstrated sinus rhythm.       INTERPRETATION:   This is a normal extended video electroencephalogram recording in the awake, drowsy and sleep state.  One typical shaking spell was captured. The spell captured was PSYCHOGENIC in nature. Clinical correlation is recommended.    Note: A normal electroencephalogram does not rule out epilepsy.     Updates provided to Dr. Siegel.     Ed Linares MD  Medical Director, Epilepsy and Neurodiagnostics.   Clinical  of Neurology [facility].   Diplomate in Neurology, Epilepsy, and Electrodiagnostic Medicine.   Office: [phone]  Fax: [phone]

## 2017-11-21 NOTE — CARE PLAN
Problem: Safety  Goal: Will remain free from falls    Intervention: Implement fall precautions  Fall precautions in place      Problem: Pain Management  Goal: Pain level will decrease to patient's comfort goal    Intervention: Follow pain managment plan developed in collaboration with patient and Interdisciplinary Team  Pain meds administered per mar without positive results, MD aware

## 2017-11-21 NOTE — CARE PLAN
Problem: Pain Management  Goal: Pain level will decrease to patient's comfort goal    Intervention: Educate and implement non-pharmacologic comfort measures. Examples: relaxation, distration, play therapy, activity therapy, massage, etc.  Massaged patient's back with warm lubricant after unable to fulfil patient's request for Dilaudid. Patient verbalizes improved comfort.

## 2017-11-21 NOTE — PROCEDURES
VIDEO ELECTROENCEPHALOGRAM / EPILEPSY MONITORING UNIT REPORT        Referring provider: Dr. Siegel.     DOS:   11/20/2017 - 11/21/2017 (total recording for 20 hrs and 31 minutes).         INDICATION:  Enedelia Pang 47 y.o. female presenting with seizure like spells.      TECHNIQUE: A 30-channel, extended video electroencephalogram (VEEG) was performed in accordance with the international 10-20 system. This digital study was reviewed in bipolar and referential montages. The recording examined the patient during wakeful, drowsy and sleep states.         DESCRIPTION OF THE RECORD:  During the awake state, background shows symmetrical 10 Hz alpha activity posteriorly with amplitude of 70 mV.  There was reactivity with eye opening/closure.  Normal anterior-posterior gradient was noted with faster beta frequencies seen anteriorly.  During drowsiness, high-amplitude delta frequencies were seen.     During the sleep state, background shows diffuse high-amplitude 4-5 Hz delta activity.  Symmetrical high-amplitude sleep spindles and vertex sharp activities were seen in the central leads.     ACTIVATION PROCEDURES:   Not performed.      ICTAL AND/OR INTERICTAL FINDINGS:   No focal or generalized epileptiform activity was noted. No regional slowing was seen during this study.  No seizures were recorded during the study.      EVENT(S):    One typical event was reported by event push button on 11/20/2017 @ 3:40 pm. Patient wakes up from sleep around 3 pm, starts crying, moaning, appears uncomfortable, states she has a bad headache. Alternates from lying down in bed, sitting up in bed with legs crossed, rocking herself in bed, standing out of bed, kneeling by the bed, crying most of the time. Family member in the room. Around 3:40 pm, while standing by the bed and asking nurse for medication for pain, she starts stuttering, then starts shaking (not jerks) diffusely and  behind her holds her as she is falling backwards, he  places her in bed, where immediately she continues to cry (louder) and appears interactive asking for help and medication for pain. The shaking spell was brief, for about 15 seconds or so. Family stated to the EEG technician that the spell captured was the typical shaking spell in question. Other than motion and muscle artifact, the eeg showed a normal, awake background in the posterior leads (before, during, and after the shaking spell). The event was psychogenic in nature.         EKG: sampling review of EKG recording demonstrated sinus rhythm.         INTERPRETATION:   This is a normal extended video electroencephalogram recording in the awake, drowsy and sleep state.  One typical shaking spell was captured. The spell captured was PSYCHOGENIC in nature. Clinical correlation is recommended.     Note: A normal electroencephalogram does not rule out epilepsy.      Updates provided to Dr. Siegel.      Ed Linares MD  Medical Director, Epilepsy and Neurodiagnostics.   Clinical  of Neurology [facility].   Diplomate in Neurology, Epilepsy, and Electrodiagnostic Medicine.   Office: [phone]  Fax: [phone]    AJAY KNIGHT    DD:  11/21/2017 07:20:54  DT:  11/21/2017 08:06:09    D#:  [id number]  Job#:  [id number]

## 2017-11-21 NOTE — PROGRESS NOTES
CC: Nonepileptic Spells, Migraine      HPI:    Ms. Pang had a shaking episode yesterday captured by 24 hour video EEG monitoring. She had been frustrated due to her intractable headache pain, but there was no EEG correlation with her episode. She continues to have headache. Oxycodone only helped for about an hour and forty five minutes then wore off. She tells me that she he struggled with chronic daily migraines for many years. Depakote seemed to help, but she had elevation in her liver enzymes and even had a liver biopsy because of it. She has tried amitryptiline without effect, and Topiramate did not help either despite a full titration. She had tried DHE in the past which helped. She requests Benadryl be added back to her regimen as well. She is on steroids because she is being worked up for rheumatological disease and has an appointment coming up at [place].       ROS:   Constitutional: No fevers or chills.  Eyes: No blurry vision or eye pain.  ENT: No dysphagia or hearing loss.  Respiratory: No cough or shortness of breath.  Cardiovascular: No chest pain or palpitations.  GI: No nausea, vomiting, or diarrhea.  : No urinary incontinence or dysuria.  Musculoskeletal: No joint swelling or arthralgias.  Skin: + skin rash.  Neuro: + headaches, no dizziness, + shaking movements..  Endocrine: No heat or cold intolerance. No polydipsia or polyuria.  Psych: No depression or anxiety.  Heme/Lymph: No easy bruising or swollen lymph nodes.  All other review of systems were reviewed and were negative.     Past Medical History:   Past Medical History:   Diagnosis Date   • Anemia 10/05/2017    Unsure if a current issue.   • Allergy, unspecified not elsewhere classified    • Anxiety    • autoimmune    • Depression    • H/O Clostridium difficile infection    • Hx MRSA infection    • Hydrocephalus     with lumbar shunt   • Migraine with aura, with intractable migraine, so stated, without mention of status migrainosus    •  MRSA (methicillin resistant Staphylococcus aureus)    • Pituitary abnormality (CMS-HCC)    • Staph infection    • Stroke (CMS-HCC)     2007       Past Surgical History:   Past Surgical History:   Procedure Laterality Date   •  SHUNT INSERTION  5/31/2017    Procedure:  SHUNT INSERTION;  Surgeon: Drew Berry M.D.;  Location: SURGERY [facility];  Service:    • SPINAL CORD STIMULATOR  12/19/2016    Procedure: SPINAL CORD STIMULATOR FOR: PLACEMENT OF LEFT FLANK OCCIPITAL NERVE STIMULATOR BATTERY PACK;  Surgeon: Oli Oh III, M.D.;  Location: SURGERY [facility];  Service:    • LUMBAR EXPLORATION N/A 8/31/2015    Procedure: LUMBAR EXPLORATION- Lumbar shunt removal ;  Surgeon: Oli Oh III, M.D.;  Location: SURGERY [facility];  Service:    • IRRIGATION & DEBRIDEMENT NEURO N/A 6/28/2015    Procedure: IRRIGATION & DEBRIDEMENT NEURO;  Surgeon: Oli Oh III, M.D.;  Location: SURGERY [facility];  Service:    • APPENDECTOMY     • CHOLECYSTECTOMY     • OTHER      right knee surgery   • OTHER NEUROLOGICAL SURG      occipital nerve stimulator   • TONSILLECTOMY     • TUBAL LIGATION         Social History:   Social History     Social History   • Marital status:      Spouse name: N/A   • Number of children: N/A   • Years of education: N/A     Occupational History   •       on disability     Social History Main Topics   • Smoking status: Never Smoker   • Smokeless tobacco: Never Used   • Alcohol use Yes      Comment: Rare   • Drug use: No   • Sexual activity: Not on file     Other Topics Concern   • Not on file     Social History Narrative   • No narrative on file       Family Hx:   Family History   Problem Relation Age of Onset   • No Known Problems Mother    • No Known Problems Father        Current Medications:   Current Facility-Administered Medications:   •  aspirin (ASA) chewable tab 81 mg, 81 mg, Oral, DAILY, Abby Márquez M.D.  •  diphenhydrAMINE (BENADRYL)  injection 25 mg, 25 mg, Intravenous, Q6HRS, Cathy Siegel, D.OFrederick, 25 mg at 11/21/17 2336  •  divalproex ER (DEPAKOTE ER) tablet 500 mg, 500 mg, Oral, Q EVENING, 500 mg at 11/21/17 2108 **FOLLOWED BY** [START ON 11/24/2017] divalproex ER (DEPAKOTE ER) tablet 500 mg, 500 mg, Oral, BID **FOLLOWED BY** [START ON 11/27/2017] divalproex ER (DEPAKOTE ER) tablet 500 mg, 500 mg, Oral, TID, Cathy Siegel, D.O.  •  oxycodone immediate release (ROXICODONE) tablet 10 mg, 10 mg, Oral, Q4HRS PRN, Abby Márquez M.D., 10 mg at 11/22/17 0147  •  predniSONE (DELTASONE) tablet 10 mg, 10 mg, Oral, DAILY, Mary Taveras M.D., 10 mg at 11/21/17 0902  •  duloxetine (CYMBALTA) capsule 60 mg, 60 mg, Oral, BID, Juan Miguel Rosales M.D., 60 mg at 11/21/17 2109  •  gabapentin (NEURONTIN) capsule 300 mg, 300 mg, Oral, TID, Juan Miguel Rosales M.D., 300 mg at 11/21/17 2108  •  multivitamin (THERAGRAN) tablet 1 Tab, 1 Tab, Oral, DAILY, Juan Miguel Rosales M.D., 1 Tab at 11/21/17 0902  •  omeprazole (PRILOSEC) capsule 20 mg, 20 mg, Oral, DAILY, Juan Miguel Rosales M.D., 20 mg at 11/21/17 0902  •  risperidone (RISPERDAL) tablet 1 mg, 1 mg, Oral, BID, Juan Miguel Rosales M.D., 1 mg at 11/21/17 2109  •  NS infusion, , Intravenous, Continuous, Juan Miguel Rosales M.D., Last Rate: 125 mL/hr at 11/21/17 0516  •  senna-docusate (PERICOLACE or SENOKOT S) 8.6-50 MG per tablet 2 Tab, 2 Tab, Oral, BID, 2 Tab at 11/21/17 0902 **AND** polyethylene glycol/lytes (MIRALAX) PACKET 1 Packet, 1 Packet, Oral, QDAY PRN **AND** magnesium hydroxide (MILK OF MAGNESIA) suspension 30 mL, 30 mL, Oral, QDAY PRN **AND** bisacodyl (DULCOLAX) suppository 10 mg, 10 mg, Rectal, QDAY PRN, Juan Miguel Rosales M.D.  •  Respiratory Care per Protocol, , Nebulization, Continuous RT, Juan Miguel Rosales M.D.  •  enoxaparin (LOVENOX) inj 40 mg, 40 mg, Subcutaneous, DAILY, Juan Miguel Rosales M.D., 40 mg at 11/21/17 0903  •  labetalol (NORMODYNE,TRANDATE) injection 10 mg, 10 mg,  "Intravenous, Q4HRS PRN, Juan Miguel Rosales M.D.  •  ondansetron (ZOFRAN) syringe/vial injection 4 mg, 4 mg, Intravenous, Q4HRS PRN, Juan Miguel Rosales M.D.  •  ondansetron (ZOFRAN ODT) dispertab 4 mg, 4 mg, Oral, Q4HRS PRN, Juan Miguel Rosales M.D.  •  lorazepam (ATIVAN) injection 4 mg, 4 mg, Intravenous, Q10 MIN PRN, Ashlee Lee M.D.  •  thiamine (THIAMINE) tablet 100 mg, 100 mg, Oral, DAILY, Mary Taveras M.D., 100 mg at 11/21/17 0902  •  levetiracetam (KEPPRA) 500 mg in D5W 100 mL IVPB, 500 mg, Intravenous, Q12HRS, CASSI Ramos M.D., Stopped at 11/21/17 2124    Allergies:   Allergies   Allergen Reactions   • Prochlorperazine Swelling     Tongue swelling. Reaction as a teen. (compazine)  Tolerated Phenergan on 02/24/15   • Sumatriptan Unspecified     Historical=\"Heart stops.\" Reaction  between 1995 to 1997   • Vancomycin Shortness of Breath and Rash     Reaction in 2005.  D/W patient 8/31/15 - tolerated loading dose of vancomycin administered on 8/30/15 with some itching to chest with decreased infusion rate. Red Man Syndrome         Physical Exam:   Ambulatory Vitals  Vitals  Blood Pressure: 116/66  Temperature: 36.8 °C (98.2 °F)  Temp src: Temporal  Pulse: 86  Respiration: 18  Pulse Oximetry: 99 %  Height: 160 cm (5' 2.99\")  Weight: 74.3 kg (163 lb 12.8 oz)  Encounter Vitals  Temperature: 36.8 °C (98.2 °F)  Temp Source: Temporal  Blood Pressure: 116/66  BP Location: Left, Upper Arm  Patient BP Position: Supine  Pulse: 86  Respiration: 18  Pulse Oximetry: 99 %  O2 (LPM): 2.5  O2 Delivery: Silicone Nasal Cannula  Weight: 74.3 kg (163 lb 12.8 oz)  How Weight Obtained: Bed Scale  Reason weight was either estimated or stated: Non-weight bearing  Height: 160 cm (5' 2.99\")  BMI (Calculated): 28.87  Location: Head  Description: Aching  Breastfeeding Status: No      Constitutional: Well-developed, well-nourished, good hygiene. Appears stated age.  Cardiovascular: RRR, with no murmurs, rubs or gallops. No carotid " bruits. No peripheral edema, pedal pulses intact.   Respiratory: Lungs CTA B/L, no W/R/R.   Skin: Warm, dry, intact. No rashes observed.  Eyes:    Funduscopic: Optic discs flat with no evidence of papilledema or pallor. Normal posterior segments.  Neurologic:   Mental Status: Awake, alert, oriented x 3.   Speech: Fluent with normal prosody.   Memory: Able to recall 3 words at 1 minute and 5 minutes. Able to recall recent and remote events accurately.    Concentration: Attentive. Able to focus on history and follow multi-step commands.   Fund of Knowledge: Appropriate.   Cranial Nerves:    CN II: PERRL. No afferent pupillary defect.    CN III: Good eye closure, EOMI.     CN IV: EOMI    CN V: Facial sensation intact and symmetric.     CN VI: EOMI    CN VII: No facial asymmetry.     CN VIII: Hearing intact.     CN IX and X: Palate elevates symmetrically. Normal gag reflex.    CN XI: Symmetric shoulder shrug.     CN XII: Tongue midline.    Sensory: Intact light touch, vibration and temperature.    Coordination: No evidence of past-pointing on finger to nose testing, no dysdiadochokinesia. Heel to shin intact.    DTR's: 2+ throughout without clonus.    Babinski: Toes downgoing bilaterally.   Romberg: Negative.   Movements: No tremors observed.   Musculoskeletal:    Strength: 5/5 in upper and lower extremities bilaterally.   Gait: Steady, narrow based.    Tone: Normal bulk and tone.   Joints: No swelling.     EEG from 11/20/17  INTERPRETATION:   This is a normal extended video electroencephalogram recording in the awake, drowsy and sleep state.  One typical shaking spell was captured. The spell captured was PSYCHOGENIC in nature. Clinical correlation is recommended.        Assessment:   46 yo F w pseudotumor s/p  shunt 5 months ago, low grade fevers, undiagnosed rheumatological disorder, chronic daily headaches/migraines, p/w shaking episodes and intractable migraine. Likely nonepileptic spells. I had a long discussion  w patient and her family re: nonepileptic spells and they understand. I counseled them on what to do in the event that she has additional spells and focused on keeping her safe. No need for LP at this point - will only put her at further risk for post-spinal tap headache.    Plan:  Will try to break headache cycle w DHE in hospital  Add Benadryl 50mg q6 hours (home regimen)  Restart Depakote 500mg per day and titrate up to 500mg TID (titration listed in Depakote order)  3L Oyxgen prn now with Rx for home O2 DME prescription  Non-epileptic spells per 24 hour EEG monitoring which captured an episode yesterday  Will taper off Keily Siegel DO

## 2017-11-22 VITALS
DIASTOLIC BLOOD PRESSURE: 68 MMHG | HEART RATE: 81 BPM | SYSTOLIC BLOOD PRESSURE: 114 MMHG | TEMPERATURE: 96.9 F | RESPIRATION RATE: 16 BRPM | OXYGEN SATURATION: 97 % | BODY MASS INDEX: 29.02 KG/M2 | HEIGHT: 63 IN | WEIGHT: 163.8 LBS

## 2017-11-22 LAB
ALBUMIN SERPL BCP-MCNC: 3.6 G/DL (ref 3.2–4.9)
ALBUMIN/GLOB SERPL: 1.6 G/DL
ALP SERPL-CCNC: 122 U/L (ref 30–99)
ALT SERPL-CCNC: 100 U/L (ref 2–50)
ANION GAP SERPL CALC-SCNC: 9 MMOL/L (ref 0–11.9)
AST SERPL-CCNC: 55 U/L (ref 12–45)
BASOPHILS # BLD AUTO: 0.7 % (ref 0–1.8)
BASOPHILS # BLD: 0.04 K/UL (ref 0–0.12)
BILIRUB SERPL-MCNC: 0.3 MG/DL (ref 0.1–1.5)
BUN SERPL-MCNC: 9 MG/DL (ref 8–22)
CALCIUM SERPL-MCNC: 8.3 MG/DL (ref 8.5–10.5)
CHLORIDE SERPL-SCNC: 105 MMOL/L (ref 96–112)
CO2 SERPL-SCNC: 23 MMOL/L (ref 20–33)
CREAT SERPL-MCNC: 0.62 MG/DL (ref 0.5–1.4)
ENA SS-B IGG SER IA-ACNC: 0 AU/ML (ref 0–40)
EOSINOPHIL # BLD AUTO: 0.04 K/UL (ref 0–0.51)
EOSINOPHIL NFR BLD: 0.7 % (ref 0–6.9)
ERYTHROCYTE [DISTWIDTH] IN BLOOD BY AUTOMATED COUNT: 46.5 FL (ref 35.9–50)
GFR SERPL CREATININE-BSD FRML MDRD: >60 ML/MIN/1.73 M 2
GLOBULIN SER CALC-MCNC: 2.3 G/DL (ref 1.9–3.5)
GLUCOSE SERPL-MCNC: 126 MG/DL (ref 65–99)
HCT VFR BLD AUTO: 35.8 % (ref 37–47)
HGB BLD-MCNC: 11.5 G/DL (ref 12–16)
IMM GRANULOCYTES # BLD AUTO: 0.02 K/UL (ref 0–0.11)
IMM GRANULOCYTES NFR BLD AUTO: 0.3 % (ref 0–0.9)
LYMPHOCYTES # BLD AUTO: 1.6 K/UL (ref 1–4.8)
LYMPHOCYTES NFR BLD: 27.9 % (ref 22–41)
MCH RBC QN AUTO: 30.5 PG (ref 27–33)
MCHC RBC AUTO-ENTMCNC: 32.1 G/DL (ref 33.6–35)
MCV RBC AUTO: 95 FL (ref 81.4–97.8)
MONOCYTES # BLD AUTO: 0.4 K/UL (ref 0–0.85)
MONOCYTES NFR BLD AUTO: 7 % (ref 0–13.4)
NEUTROPHILS # BLD AUTO: 3.64 K/UL (ref 2–7.15)
NEUTROPHILS NFR BLD: 63.4 % (ref 44–72)
NRBC # BLD AUTO: 0 K/UL
NRBC BLD AUTO-RTO: 0 /100 WBC
PLATELET # BLD AUTO: 291 K/UL (ref 164–446)
PMV BLD AUTO: 9.7 FL (ref 9–12.9)
POTASSIUM SERPL-SCNC: 3.2 MMOL/L (ref 3.6–5.5)
PROT SERPL-MCNC: 5.9 G/DL (ref 6–8.2)
RBC # BLD AUTO: 3.77 M/UL (ref 4.2–5.4)
SODIUM SERPL-SCNC: 137 MMOL/L (ref 135–145)
SSA52 R0ENA AB IGG Q0420: 0 AU/ML (ref 0–40)
SSA60 R0ENA AB IGG Q0419: 14 AU/ML (ref 0–40)
WBC # BLD AUTO: 5.7 K/UL (ref 4.8–10.8)

## 2017-11-22 PROCEDURE — A9270 NON-COVERED ITEM OR SERVICE: HCPCS | Performed by: STUDENT IN AN ORGANIZED HEALTH CARE EDUCATION/TRAINING PROGRAM

## 2017-11-22 PROCEDURE — 99239 HOSP IP/OBS DSCHRG MGMT >30: CPT | Mod: GC | Performed by: HOSPITALIST

## 2017-11-22 PROCEDURE — 700102 HCHG RX REV CODE 250 W/ 637 OVERRIDE(OP): Performed by: INTERNAL MEDICINE

## 2017-11-22 PROCEDURE — 80053 COMPREHEN METABOLIC PANEL: CPT

## 2017-11-22 PROCEDURE — 700111 HCHG RX REV CODE 636 W/ 250 OVERRIDE (IP): Performed by: SPECIALIST

## 2017-11-22 PROCEDURE — A9270 NON-COVERED ITEM OR SERVICE: HCPCS | Performed by: INTERNAL MEDICINE

## 2017-11-22 PROCEDURE — 700102 HCHG RX REV CODE 250 W/ 637 OVERRIDE(OP): Performed by: STUDENT IN AN ORGANIZED HEALTH CARE EDUCATION/TRAINING PROGRAM

## 2017-11-22 PROCEDURE — 700111 HCHG RX REV CODE 636 W/ 250 OVERRIDE (IP): Performed by: INTERNAL MEDICINE

## 2017-11-22 PROCEDURE — 700111 HCHG RX REV CODE 636 W/ 250 OVERRIDE (IP): Performed by: PSYCHIATRY & NEUROLOGY

## 2017-11-22 PROCEDURE — 85025 COMPLETE CBC W/AUTO DIFF WBC: CPT

## 2017-11-22 PROCEDURE — 700111 HCHG RX REV CODE 636 W/ 250 OVERRIDE (IP): Performed by: STUDENT IN AN ORGANIZED HEALTH CARE EDUCATION/TRAINING PROGRAM

## 2017-11-22 PROCEDURE — 700105 HCHG RX REV CODE 258: Performed by: SPECIALIST

## 2017-11-22 PROCEDURE — 700111 HCHG RX REV CODE 636 W/ 250 OVERRIDE (IP)

## 2017-11-22 RX ORDER — LEVETIRACETAM 500 MG/1
500 TABLET ORAL DAILY
Qty: 30 TAB | Refills: 0 | Status: SHIPPED | OUTPATIENT
Start: 2017-11-22 | End: 2017-11-22

## 2017-11-22 RX ORDER — DIPHENHYDRAMINE HYDROCHLORIDE 50 MG/ML
25 INJECTION INTRAMUSCULAR; INTRAVENOUS EVERY 12 HOURS
Qty: 14 ML | Refills: 0 | Status: SHIPPED | OUTPATIENT
Start: 2017-11-22 | End: 2017-12-06

## 2017-11-22 RX ORDER — POTASSIUM CHLORIDE 20 MEQ/1
40 TABLET, EXTENDED RELEASE ORAL ONCE
Status: COMPLETED | OUTPATIENT
Start: 2017-11-22 | End: 2017-11-22

## 2017-11-22 RX ORDER — LEVETIRACETAM 500 MG/1
500 TABLET ORAL DAILY
Qty: 30 TAB | Refills: 0 | Status: SHIPPED | OUTPATIENT
Start: 2017-11-22 | End: 2018-02-22

## 2017-11-22 RX ORDER — DIHYDROERGOTAMINE MESYLATE 1 MG/ML
1 INJECTION, SOLUTION INTRAMUSCULAR; INTRAVENOUS; SUBCUTANEOUS ONCE
Status: COMPLETED | OUTPATIENT
Start: 2017-11-22 | End: 2017-11-22

## 2017-11-22 RX ORDER — DIVALPROEX SODIUM 500 MG/1
500 TABLET, EXTENDED RELEASE ORAL EVERY EVENING
Qty: 30 TAB | Refills: 0 | Status: ON HOLD | OUTPATIENT
Start: 2017-11-22 | End: 2018-04-09

## 2017-11-22 RX ADMIN — PREDNISONE 10 MG: 10 TABLET ORAL at 09:05

## 2017-11-22 RX ADMIN — RISPERIDONE 1 MG: 1 TABLET, FILM COATED ORAL at 09:05

## 2017-11-22 RX ADMIN — Medication 100 MG: at 09:05

## 2017-11-22 RX ADMIN — GABAPENTIN 300 MG: 300 CAPSULE ORAL at 09:06

## 2017-11-22 RX ADMIN — POTASSIUM CHLORIDE 40 MEQ: 1500 TABLET, EXTENDED RELEASE ORAL at 11:50

## 2017-11-22 RX ADMIN — DIPHENHYDRAMINE HYDROCHLORIDE 25 MG: 50 INJECTION, SOLUTION INTRAMUSCULAR; INTRAVENOUS at 06:02

## 2017-11-22 RX ADMIN — STANDARDIZED SENNA CONCENTRATE AND DOCUSATE SODIUM 2 TABLET: 8.6; 5 TABLET, FILM COATED ORAL at 09:05

## 2017-11-22 RX ADMIN — OXYCODONE HYDROCHLORIDE 10 MG: 10 TABLET ORAL at 06:02

## 2017-11-22 RX ADMIN — OXYCODONE HYDROCHLORIDE 10 MG: 10 TABLET ORAL at 10:29

## 2017-11-22 RX ADMIN — DIHYDROERGOTAMINE MESYLATE 1 MG: 1 INJECTION, SOLUTION INTRAMUSCULAR; INTRAVENOUS; SUBCUTANEOUS at 13:19

## 2017-11-22 RX ADMIN — THERA TABS 1 TABLET: TAB at 09:06

## 2017-11-22 RX ADMIN — DULOXETINE HYDROCHLORIDE 60 MG: 60 CAPSULE, DELAYED RELEASE ORAL at 09:06

## 2017-11-22 RX ADMIN — ASPIRIN 81 MG: 81 TABLET, CHEWABLE ORAL at 09:06

## 2017-11-22 RX ADMIN — OMEPRAZOLE 20 MG: 20 CAPSULE, DELAYED RELEASE ORAL at 09:05

## 2017-11-22 RX ADMIN — ENOXAPARIN SODIUM 40 MG: 100 INJECTION SUBCUTANEOUS at 09:05

## 2017-11-22 RX ADMIN — DEXTROSE MONOHYDRATE 500 MG: 50 INJECTION, SOLUTION INTRAVENOUS at 09:05

## 2017-11-22 RX ADMIN — DIPHENHYDRAMINE HYDROCHLORIDE 25 MG: 50 INJECTION, SOLUTION INTRAMUSCULAR; INTRAVENOUS at 11:50

## 2017-11-22 RX ADMIN — OXYCODONE HYDROCHLORIDE 10 MG: 10 TABLET ORAL at 01:47

## 2017-11-22 ASSESSMENT — PAIN SCALES - GENERAL
PAINLEVEL_OUTOF10: 8
PAINLEVEL_OUTOF10: 7
PAINLEVEL_OUTOF10: 7
PAINLEVEL_OUTOF10: 10
PAINLEVEL_OUTOF10: 0
PAINLEVEL_OUTOF10: 3

## 2017-11-22 NOTE — CARE PLAN
Problem: Safety  Goal: Will remain free from injury  Outcome: PROGRESSING AS EXPECTED  Seizure precautions in place     Problem: Psychosocial Needs:  Goal: Level of anxiety will decrease  Outcome: PROGRESSING AS EXPECTED

## 2017-11-22 NOTE — DISCHARGE INSTRUCTIONS
Discharge Instructions    Discharged to home by car with relative. Discharged via wheelchair, hospital escort: Yes.  Special equipment needed: Not Applicable    Be sure to schedule a follow-up appointment with your primary care doctor or any specialists as instructed.     Discharge Plan:   Diet Plan: Discussed  Activity Level: Discussed  Confirmed Follow up Appointment: Patient to Call and Schedule Appointment  Confirmed Symptoms Management: Discussed  Medication Reconciliation Updated: Yes  Pneumococcal Vaccine Given - only chart on this line when given: Given (See MAR)  Influenza Vaccine Indication: Indicated: 9 to 64 years of age  Influenza Vaccine Given - only chart on this line when given: Influenza Vaccine Given (See MAR)    I understand that a diet low in cholesterol, fat, and sodium is recommended for good health. Unless I have been given specific instructions below for another diet, I accept this instruction as my diet prescription.   Other diet: Regular    Special Instructions: None      Epilepsy  People with epilepsy have times when they shake and jerk uncontrollably (seizures). This happens when there is a sudden change in brain function. Epilepsy may have many possible causes. Anything that disturbs the normal pattern of brain cell activity can lead to seizures.  HOME CARE   · Follow your doctor's instructions about driving and safety during normal activities.  · Get enough sleep.  · Only take medicine as told by your doctor.  · Avoid things that you know can cause you to have seizures (triggers).  · Write down when your seizures happen and what you remember about each seizure. Write down anything you think may have caused the seizure to happen.  · Tell the people you live and work with that you have seizures. Make sure they know how to help you. They should:  ¨ Cushion your head and body.  ¨ Turn you on your side.  ¨ Not restrain you.  ¨ Not place anything inside your mouth.  ¨ Call for local emergency  medical help if there is any question about what has happened.  · Keep all follow-up visits with your doctor. This is very important.  GET HELP IF:  · You get an infection or start to feel sick. You may have more seizures when you are sick.  · You are having seizures more often.  · Your seizure pattern is changing.  GET HELP RIGHT AWAY IF:   · A seizure does not stop after a few seconds or minutes.  · A seizure causes you to have trouble breathing.  · A seizure gives you a very bad headache.  · A seizure makes you unable to speak or use a part of your body.     This information is not intended to replace advice given to you by your health care provider. Make sure you discuss any questions you have with your health care provider.     Document Released: 10/15/2010 Document Revised: 10/08/2014 Document Reviewed: 07/30/2014  [employer] Interactive Patient Education ©2016 [employer]      Depression / Suicide Risk    As you are discharged from this [facility] facility, it is important to learn how to keep safe from harming yourself.    Recognize the warning signs:  · Abrupt changes in personality, positive or negative- including increase in energy   · Giving away possessions  · Change in eating patterns- significant weight changes-  positive or negative  · Change in sleeping patterns- unable to sleep or sleeping all the time   · Unwillingness or inability to communicate  · Depression  · Unusual sadness, discouragement and loneliness  · Talk of wanting to die  · Neglect of personal appearance   · Rebelliousness- reckless behavior  · Withdrawal from people/activities they love  · Confusion- inability to concentrate     If you or a loved one observes any of these behaviors or has concerns about self-harm, here's what you can do:  · Talk about it- your feelings and reasons for harming yourself  · Remove any means that you might use to hurt yourself (examples: pills, rope, extension cords, firearm)  · Get professional help  from the community (Mental Health, Substance Abuse, psychological counseling)  · Do not be alone:Call your Safe Contact- someone whom you trust who will be there for you.  · Call your local CRISIS HOTLINE 592-5030 or 232-824-7503  · Call your local Children's Mobile Crisis Response Team [place] (628) 754-9908 or www.[employer]  · Call the toll free National Suicide Prevention Hotlines   · National Suicide Prevention Lifeline 556-629-QWIO (4168)  · National Hope Line Network 800-SUICIDE (517-9727)

## 2017-11-22 NOTE — FACE TO FACE
Face to Face Note  -  Durable Medical Equipment    Mino Johnson M.D. - NPI: [id number]  I certify that this patient is under my care and that they had a durable medical equipment(DME)face to face encounter by myself that meets the physician DME face-to-face encounter requirements with this patient on:    Date of encounter:   Patient:                    MRN:                       YOB: 2015  Enedelia Pang  [id number]  1970     The encounter with the patient was in whole, or in part, for the following medical condition, which is the primary reason for durable medical equipment:  Other - Headache    I certify that, based on my findings, the following durable medical equipment is medically necessary:  Oxygen.    HOME O2 Saturation Measurements:(Values must be present for Home Oxygen orders)  Room air sat at rest: 96  Room air sat with amb: 95  With liters of O2: 1, O2 sat at rest with O2: 100  With Liters of O2: 1, O2 sat with amb with O2 : 100  Is the patient mobile?: Yes    Patient would be benefit 3L of oxygen as needed basis.      My Clinical findings support the need for the above equipment due to:  Other - Headache    Supporting Symptoms: Patient has severe symptoms of headache and she feels better with oxygen.

## 2017-11-22 NOTE — PROGRESS NOTES
Internal Medicine Interval Note  Note Author: Abby Márquez M.D.     Name Enedelia Pang     1970   Age/Sex 47 y.o. female   MRN [id number]   Code Status FULL     After 5PM or if no immediate response to page, please call for cross-coverage  Attending/Team: /Petar See Patient List for primary contact information  Call [phone] to page    1st Call - Day Intern (R1):    2nd Call - Day Sr. Resident (R2/R3):            Reason for interval visit  (Principal Problem)   Seizures (CMS-HCC)    Interval Problem Daily Status Update  (24 hours)   Pt was seen & examined at bedside. No acute events & changes overnight. Pt looks anxious & fatigue, but more awake & alert compared to admission day. Pt is being monitored for 24 hr EEG on AM rounds.  at Bedside. Pt still complaints of terrible headache, 10/10 in frontal area. On AM rounds, discussed further plan with Dr. Hutchinson. Started Depakote ER & Benadryl on 2017. Pt & family agreed with the plan. Neurology on board.   CT showed no acute abnormalities. 24 hr EEG on 2017 showed no significant abnormalities.     On 2017,Pt was asking Roxicodone 10 mg Q4H, stating that she has withdrawal symptoms. UDS negative on admission. Pt was shaking, anxious, tearful, screaming at the staffs. On 2017, pt stated that she has terrible headache & crying , asking for Dilaudid specifically. Given one time low dose Dilaudid was given.   History of sero neagative RA ( on admission, ESR 19, C reactive protein 0.27, ferritin 21). Rheumatologist's records reviewed.   OT recommended SNF or Home Health for Discharge Planning.     Chronic Migraine Headache:  not improving on Roxicodone 10 mg Q4 & Duloxetine , added Benadryl on 2017  Seizure: 24 hr EEG showed no significant abnormalities. Continue Keppra per Neuro. Added Depakote ER on 2017  Pseudotumor Cerebri: S/P shunt on 2017 by Dr. Berry    Review of Systems    Constitutional: Positive for malaise/fatigue. Negative for fever.   HENT: Negative for congestion and sore throat.    Eyes: Negative for blurred vision and double vision.   Respiratory: Negative for cough, sputum production and shortness of breath.    Cardiovascular: Negative for chest pain and leg swelling.   Gastrointestinal: Positive for nausea. Negative for abdominal pain, constipation, diarrhea and vomiting.   Genitourinary: Negative for dysuria.   Musculoskeletal: Negative for myalgias.   Neurological: Positive for weakness and headaches. Negative for dizziness, sensory change, speech change, focal weakness and seizures.   Psychiatric/Behavioral: The patient is nervous/anxious.        Consultants/Specialty  Neurology    Disposition  Inpatient    Quality Measures    Reviewed items::  Labs reviewed and Medications reviewed  Lopez catheter::  No Lopez  DVT prophylaxis pharmacological::  Enoxaparin (Lovenox)          Physical Exam       Vitals:    11/20/17 1600 11/20/17 2000 11/21/17 0400 11/21/17 0800   BP: 148/70 145/93 104/64 117/67   Pulse: 90 (!) 101 90 85   Resp: (!) 22 16 16 14   Temp: 36.9 °C (98.4 °F) 36.6 °C (97.8 °F) 36 °C (96.8 °F) 35.9 °C (96.7 °F)   TempSrc:       SpO2: 94% 99% 100% 100%   Weight:       Height:         Body mass index is 29.02 kg/m².    Oxygen Therapy:  Pulse Oximetry: 100 %, O2 (LPM): 4, O2 Delivery: Silicone Nasal Cannula    Physical Exam   Constitutional: She is oriented to person, place, and time. No distress.   HENT:   Head: Normocephalic and atraumatic.   Eyes: Conjunctivae and EOM are normal. Pupils are equal, round, and reactive to light.   Neck: Normal range of motion. Neck supple.   Cardiovascular: Normal rate, regular rhythm and normal heart sounds.    Pulmonary/Chest: Effort normal and breath sounds normal. No respiratory distress.   Abdominal: Soft. Bowel sounds are normal.   Musculoskeletal: Normal range of motion.   Skin Ulcers noted in upper & lower extremities, not  inflamed, no Erythema or Discharge noted.       Neurological: She is alert and oriented to person, place, and time.   Motor 5/5. Sensation & CN 1-12 grossly intact. DTRs 2+ symmetrical.    Skin: Skin is warm.   Psychiatric: Affect normal.         Lab Data Review:         11/21/2017  4:22 PM    No results for input(s): SODIUM, POTASSIUM, CHLORIDE, CO2, BUN, CREATININE, MAGNESIUM, PHOSPHORUS, CALCIUM in the last 72 hours.    No results for input(s): ALTSGPT, ASTSGOT, ALKPHOSPHAT, TBILIRUBIN, DBILIRUBIN, GAMMAGT, AMYLASE, LIPASE, ALB, PREALBUMIN, GLUCOSE in the last 72 hours.    Recent Labs      11/20/17   0300   RBC  3.77*   HEMOGLOBIN  11.7*   HEMATOCRIT  35.5*   PLATELETCT  292       Recent Labs      11/20/17   0300   WBC  6.0   NEUTSPOLYS  62.80   LYMPHOCYTES  28.00   MONOCYTES  7.70   EOSINOPHILS  0.50   BASOPHILS  0.80           Assessment/Plan     * Seizures (CMS-HCC)- (present on admission)   Assessment & Plan    - Patient experienced multiple episodes with seizure-like activity as recorded by   - Based on videos appears to have been focal seizure evolving to a bilateral tonic-clonic seizure   - EEG on 11/2017 showed nonspecific cortical dysfunction, recommended prolonged outpatient EEG monitoring  - CT head showed no acute abnormalities  - Patient started on Keppra, will continue Neurotin  - CPK : WNL, started on IV fluids  - Seizure/Aspiration precautions, Q4H Neuro checks   - Neurology on board. EEG on 11/17/2017 showed '''An abnormal video EEG recording in the awake, drowsy, and sleep state. Mild to moderate diffuse cerebral dysfunction, suggestive of an encephalopathic state.''  -Per Neuro, continue Keppra   -24 hr EEG done on Monday 11/20/2017 showed no significant abnormalities  -Started Depakote ER & Benadryl on 11/21/2017.        Opiate withdrawal (CMS-HCC)   Assessment & Plan    - per pt & , pt is taking roxicodone 10 mg Q4H x 9 months  - Pt was shaking, anxious, tearful, screaming at  the staffs, stating that she is withdrawing from roxicodone  - UDS negative on admission  - continue her home medication            Pseudotumor cerebri- (present on admission)   Assessment & Plan    - Patient has history of pseudotumor cerebri status post  shunt placed approximately 5 months ago  - Currently well-controlled, continue monitor  - considering acetazolamide if it is getting worse        Chronic migraine- (present on admission)   Assessment & Plan    - Patient has history of chronic, debilitating migraines since age 14  - S/p occipital nerve stimulator placed in 2003  - Continue home medications  -Started Depakote ER & Benadryl on 11/21/2017.        Seronegative rheumatoid arthritis (CMS-[facility])- (present on admission)   Assessment & Plan    - Patient has history of seronegative rheumatoid arthritis  - Currently following rheumatology, failed trial of methotrexate due to elevated liver enzymes  - denied joint pain for this admission  -Unlikely flare up of her sero neagative RA ( ESR 19, C reactive protein 0.27, ferritin 21).  -continue home Prednisone  -  ordered TPO AB, SSA , SSB antibodoes. Will follow those results.   - Will continue to monitor        Wounds, multiple- (present on admission)   Assessment & Plan    - Patient has history of multiple ulcer like wounds on hands and lower legs  - Currently following up with rheumatology and hematology for possible hypercoagulability or autoimmune disease  - We will continue to follow, no signs of infection or indication for a wound care consult at this time

## 2017-11-22 NOTE — PROGRESS NOTES
Pt A&OX4, c/o headache, medicating per MAR. Changed central line dressing, seizure precautions in place. Family at bedside. Call light and personal belongings within reach.

## 2017-11-22 NOTE — THERAPY
Physical Therapy Contact Note:     Pt consult received. Pt noted to be up self in hallway multiple times today. Pt has SO who was assisting with pt PLOF.  Pt's SO other is available PRN if pt needs assist. No skilled acute PT services indicated at this time.  Please reconsult if mobility changes. PT anticipates d/c to home when medically appropriate.     Lisset/Ting [phone]

## 2017-11-22 NOTE — DISCHARGE PLANNING
Medical Social Work     SW spoke with pt at bedside and obtained DME choice of [employer]. Choice form faxed to [facility].

## 2017-11-22 NOTE — PROGRESS NOTES
Assume bedside report and care at 0700. Patient alert and oriented x 4. Patient call light within reach, bed locked and lower position,  bedside. Pain 7/10 in head, no nausea and vomiting, no numbness and tingling. Medications given per MAR. Ambulated to chair. Vital signs stable, no signs and symptom of infection noted on assessment. Plan of care discussed. All questions answered at this time.

## 2017-11-23 NOTE — DISCHARGE SUMMARY
Internal Medicine Discharge Summary  Note Author: Mino Johnson M.D.       Admit Date:  11/16/2017       Discharge Date:   11/22/17     Service:   [facility] Internal Medicine orange Team  Attending Physician(s):   Dr. Gill       Senior Resident(s):   Dr. Johnson  Gerard Resident(s):   Dr. Márquez      Primary Diagnosis:     Chronic migraine headache     Secondary Diagnoses:    Seizure like activity  Pseudotumor cerebri  Seronegative Rheumatoid arthritis                    Hospital Summary (Brief Narrative):       Enedelia Pang 47 y.o. female with past medical history of pseudotumor cerebri status post  shunt, chronic migraine headaches status post occipital nerve stimulator and seronegative rheumatoid arthritis presented to the hospital with complaint of multiple episodes seizure like activity. Patient underwent EEG on 11/17 and it didn't rule out epilepsy and she then underwent video EEG on 11/21/17 and it showed normal extended video electroencephalogram and it recorded one typical shaking spell and that spell was psychogenic in nature. Neurology has been following this patient during her course of hospitalization.    Headache: Patient has been having headache during her hospitalization and she underwent CT head and it didn't show acute abnormalities. Neurology evaluation patient and recommended Ergotamine dose on 11/21/17 and started her on Depakote, Keppra and Benadryl. Next day of initiation of her medication she started feeling better. On the day of discharge ordered one more dose of ergotamine as per neurology recommendations and discharged her on Benadryl BID, Keppra 500 mg daily and Depakote 500 mg for one more day and progressivley increasing it dose to 500 mg TID. Also patient feels that oxygen help in her symptoms of headache and neurology recommended to give oxygen PRN. Discussed with patient that she use oxygen only in need. Also discussed at length about the use of medication and close  follow up as patient expressed that in past due to use of keppra her liver enzyme elevated. Patient expressed that she will follow neurology as well as primary care.     Rheumatoid arthritis: Patient currently following rheumatology for her seronegative rheumatoid arthritis.       Consultants:     Neurology    Procedures:        EEG    Imaging/ Testing:      CT-HEAD W/O   Final Result      Stable head CT findings with no evidence of acute cerebral infarction, hemorrhage or mass lesion.            Discharge Medications:         Medication Reconciliation: Completed       Medication List      START taking these medications      Instructions   diphenhydrAMINE 50 MG/ML Soln  Commonly known as:  BENADRYL   0.5 mL by Intravenous route every 12 hours for 14 days.  Dose:  25 mg     divalproex  MG Tb24  Commonly known as:  DEPAKOTE ER   Doctor's comments:  Please take 500 mg for 1 more day. Please take twice daily started from 11/23/17. Please follow neurology for further dose adjustment.  Take 1 Tab by mouth every evening.  Dose:  500 mg     levetiracetam 500 MG Tabs  Commonly known as:  KEPPRA   Take 1 Tab by mouth every day.  Dose:  500 mg        CONTINUE taking these medications      Instructions   aspirin 81 MG Chew chewable tablet  Commonly known as:  ASA   Take 81 mg by mouth every day.  Dose:  81 mg     duloxetine 60 MG Cpep delayed-release capsule  Commonly known as:  CYMBALTA   Take 60 mg by mouth 2 times a day.  Dose:  60 mg     gabapentin 600 MG tablet  Commonly known as:  NEURONTIN   Take 600 mg by mouth 3 times a day.  Dose:  600 mg     omeprazole 20 MG delayed-release capsule  Commonly known as:  PRILOSEC   Take 1 Cap by mouth every day.  Dose:  20 mg     ondansetron 4 MG Tbdp  Commonly known as:  ZOFRAN ODT   Take 1 Tab by mouth every 8 hours as needed for Nausea/Vomiting.  Dose:  4 mg     oxycodone immediate release 10 MG immediate release tablet  Commonly known as:  ROXICODONE   Take 1 Tab by mouth  every four hours as needed for Moderate Pain.  Dose:  10 mg     risperidone 1 MG Tabs  Commonly known as:  RISPERDAL   Take 1 mg by mouth 2 times a day.  Dose:  1 mg              Disposition:   Home    Diet:   Healthy diet    Activity:   As per tolerance    Instructions:        The patient was instructed to return to the ER in the event of worsening symptoms. I have counseled the patient on the importance of compliance and the patient has agreed to proceed with all medical recommendations and follow up plan indicated above.   The patient understands that all medications come with benefits and risks. Risks may include permanent injury or death and these risks can be minimized with close reassessment and monitoring.        Primary Care Provider:      Discharge summary faxed to primary care provider:  Completed  Copy of discharge summary given to the patient: Deferred      Follow up appointment details :      Follow up with neurology and primary care    Pending Studies:        None    Time spent on discharge day patient visit, preparing discharge paperwork and arranging for patient follow up.    Summary of follow up issues:   Follow up with liver enzyme and further dose adjustment.    Discharge Time (Minutes) :    46  Hospital Course Type:  Inpatient Stay >2 midnights      Condition on Discharge    ______________________________________________________________________    Interval history/exam for day of discharge:     Evaluated patient at bedside with internal medicine team. Patient reported that she is feeling signficantly better and she is ready to go home. Discussed plan of care with her and answered all her questions.     Vitals:    11/21/17 1600 11/21/17 2000 11/22/17 0400 11/22/17 0800   BP: 122/70 116/66 119/74 114/68   Pulse: 87 86 96 81   Resp: 18 18 16 16   Temp: 36.6 °C (97.9 °F) 36.8 °C (98.2 °F) 36.7 °C (98.1 °F) 36.1 °C (96.9 °F)   TempSrc:       SpO2: 98% 99% 100% 97%   Weight:       Height:          Weight/BMI: Body mass index is 29.02 kg/m².  Pulse Oximetry: 97 %, O2 (LPM): 2.5, O2 Delivery: Silicone Nasal Cannula    General: Not in acute distress  CVS: Regular rate and rhythm with no murmur  PULM: Clear to ausculation B/L with no wheeze and ronchi  Neuro: no focal deficit    Most Recent Labs:    Lab Results   Component Value Date/Time    WBC 5.7 11/22/2017 01:53 AM    RBC 3.77 (L) 11/22/2017 01:53 AM    HEMOGLOBIN 11.5 (L) 11/22/2017 01:53 AM    HEMATOCRIT 35.8 (L) 11/22/2017 01:53 AM    MCV 95.0 11/22/2017 01:53 AM    MCH 30.5 11/22/2017 01:53 AM    MCHC 32.1 (L) 11/22/2017 01:53 AM    MPV 9.7 11/22/2017 01:53 AM    NEUTSPOLYS 63.40 11/22/2017 01:53 AM    LYMPHOCYTES 27.90 11/22/2017 01:53 AM    MONOCYTES 7.00 11/22/2017 01:53 AM    EOSINOPHILS 0.70 11/22/2017 01:53 AM    BASOPHILS 0.70 11/22/2017 01:53 AM    HYPOCHROMIA 1+ 01/11/2017 02:29 PM    ANISOCYTOSIS 1+ 01/11/2017 02:29 PM      Lab Results   Component Value Date/Time    SODIUM 137 11/22/2017 01:53 AM    POTASSIUM 3.2 (L) 11/22/2017 01:53 AM    CHLORIDE 105 11/22/2017 01:53 AM    CO2 23 11/22/2017 01:53 AM    GLUCOSE 126 (H) 11/22/2017 01:53 AM    BUN 9 11/22/2017 01:53 AM    CREATININE 0.62 11/22/2017 01:53 AM      Lab Results   Component Value Date/Time    ALTSGPT 100 (H) 11/22/2017 01:53 AM    ASTSGOT 55 (H) 11/22/2017 01:53 AM    ALKPHOSPHAT 122 (H) 11/22/2017 01:53 AM    TBILIRUBIN 0.3 11/22/2017 01:53 AM    LIPASE 17 10/12/2017 12:59 AM    ALBUMIN 3.6 11/22/2017 01:53 AM    ALBUMIN 4.69 11/06/2017 10:23 AM    GLOBULIN 2.3 11/22/2017 01:53 AM    INR 1.12 11/18/2017 04:30 AM    MACROCYTOSIS 1+ 08/30/2015 04:10 PM     Lab Results   Component Value Date/Time    PROTHROMBTM 14.1 11/18/2017 04:30 AM    INR 1.12 11/18/2017 04:30 AM

## 2017-11-29 NOTE — DOCUMENTATION QUERY
"DOCUMENTATION QUERY    PROVIDERS: Please select “Cosign w/ note”to reply to query.    To better represent the severity of illness of your patient, please review the following information and exercise your independent professional judgment in responding to this query.     Patient presented with psychogenic seizures. Dr. Linares documents narcotic withdrawal on 11/19 with the Progress Notes beginning 11/20 documenting, \"Opiate withdrawal (CMS-HCC)- per pt & , pt is taking roxicodone 10 mg Q4H x 9 months - Pt was shaking, anxious, tearful, screaming at the staffs. - UDS negative on admission- continue her home medication\".    Coding of withdrawal defaults to dependence with withdrawal. Coding guidelines direct coders to query the physician to clarify these diagnoses in the absence of a dependence diagnosis.    Can this patient's withdrawal be further specified as    1. Opioid use with withdrawal as an opioid use disorder  2. Opioid dependence with withdrawal  3. Opioid abuse with withdrawal  4. Other explanation of clinical presentation (please document)  5. Unable to determine    The medical record reflects the following:   Clinical Findings  patient on roxycodone for 9 months   narcotic withdrawal   Treatment  resuming roxycodone   Risk Factors     Location within medical record  History and Physical, Progress Notes, Discharge Summary and EEG procedure note     Thank you,   Ciara Gupta          "

## 2017-12-10 ASSESSMENT — ENCOUNTER SYMPTOMS
ABDOMINAL PAIN: 0
CHILLS: 0
FEVER: 0
COUGH: 0
HEMOPTYSIS: 0

## 2017-12-10 NOTE — PROGRESS NOTES
Subjective:   Date of Consultation:12/12/2017 10:43 AM  Primary care physician:Elliott Power M.D.    Reason for Consultation:  Ms. Pang  is a pleasant 46 y.o. year old female who presented with follow-up seronegative Rheumatoid arthritis    Seronegative Rheumatoid Arthritis, non erosive  She is off prednisone and notes increasing hand pain.  She has an upcoming appointment with [place] January 20th    Hand swelling  She was started on antibiotics by ID since she was seen at [facility] for foot infection after dropping an oxygen tank on her foot.      Fever ()  Afternoon, early evening in the morning  Can last a few hours  Yes for night sweats sometimes drenching.     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  Dermatology started her on aspirin.  Antiphospholipids are negative.  SHe has also been referred to hematology    Migraines  Responded well to botox injections  Adding oxygen at night helped minimize her headache  She developed a seizure in November and was started on depakote.      RHEUM HISTORY:  Ms. Enedelia Pang presented in January 2016 with joint pain and swelling of hands,knees, and weakness and a 3 month history of septal perforation.  ENT initial evaluation noted no disease activity.  She also has a history of abnormal LFT with unclear etiology.  She has had a work-up with GI in early 2017 which was unrevealing.  Also she has had a recurrent rash described as papules that ulcerate identified as MRSA infections.   Her labs did show thrombocytosis which could be inflammatory and anemia which was determined to be iron deficiency anemia.  Her rheumatologic work-up showed RF(-), MG (-), ANCA (-), SCL 70 (-) and anti centromere (-)  Parvovirus and rubella IgM was negative    Hand xrays did show  periarticular osteopenia.    1/9/2017  Hepatitis B surface antigen negative  Hepatitis B core antibody negative  Hepatitis C antibody negative  quantiferon negative   HIV negative     CXR 12/6/2017 was negative for edema     11/2016 Nasal biopsy did not show active disease    Previous Medication:  Sulfasalazine 1000 mg BID (off)  Methotrexate 5 tabs weekly q Thursday (off)  Folic acid  Off prednisone    Epistaxis with nasal perforation  Not addressed    Elevated liver enzymes  Off methotrexate due to elevated LFT.    Stable  She has had a liver biopsy 7/2017.  The liver biopsy suggests it is due to methotrexate toxicity however her elevated LFT had been ongoing prior to start of methotrexate    Chronic diarrhea  Had a capsule endoscopy - results are pending.    Pseudotumor cerebri  She is being followed by Dr. Berry and Erika Reece and had an injection on 8/16/2017    Iron deficiency anemia  MCV is elevated which could be secondary to her methotrexate  Her anemia has also resolved.    Renal cysts  Bilateral  Plan to see nephrology    History of retinal disease  She did see ophthalmology     Past Medical History:  Pseudotumor cerebri, MRSA infection history, CVA, miraines, pituatary tumor, migraine headaches, septal perforation. No history of blood clots    Past Medical History:   Diagnosis Date   • Allergy, unspecified not elsewhere classified    • Anemia 10/05/2017    Unsure if a current issue.   • Anxiety    • autoimmune    • Depression    • H/O Clostridium difficile infection    • Hx MRSA infection    • Hydrocephalus     with lumbar shunt   • Migraine with aura, with intractable migraine, so stated, without mention of status migrainosus    • MRSA (methicillin resistant Staphylococcus aureus)    • Pituitary abnormality (CMS-HCC)    • Staph infection    • Stroke (CMS-HCC)     2007     Past Surgical History:   Procedure Laterality Date   •  SHUNT INSERTION  5/31/2017    Procedure:  SHUNT INSERTION;  Surgeon:  "Drew Berry M.D.;  Location: SURGERY [facility];  Service:    • SPINAL CORD STIMULATOR  12/19/2016    Procedure: SPINAL CORD STIMULATOR FOR: PLACEMENT OF LEFT FLANK OCCIPITAL NERVE STIMULATOR BATTERY PACK;  Surgeon: Oli Oh III, M.D.;  Location: SURGERY [facility];  Service:    • LUMBAR EXPLORATION N/A 8/31/2015    Procedure: LUMBAR EXPLORATION- Lumbar shunt removal ;  Surgeon: Oli Oh III, M.D.;  Location: SURGERY [facility];  Service:    • IRRIGATION & DEBRIDEMENT NEURO N/A 6/28/2015    Procedure: IRRIGATION & DEBRIDEMENT NEURO;  Surgeon: Oli Oh III, M.D.;  Location: SURGERY [facility];  Service:    • APPENDECTOMY     • CHOLECYSTECTOMY     • OTHER      right knee surgery   • OTHER NEUROLOGICAL SURG      occipital nerve stimulator   • TONSILLECTOMY     • TUBAL LIGATION       Allergies   Allergen Reactions   • Prochlorperazine Swelling     Tongue swelling. Reaction as a teen. (compazine)  Tolerated Phenergan on 02/24/15   • Sumatriptan Unspecified     Historical=\"Heart stops.\" Reaction  between 1995 to 1997   • Vancomycin Shortness of Breath and Rash     Reaction in 2005.  D/W patient 8/31/15 - tolerated loading dose of vancomycin administered on 8/30/15 with some itching to chest with decreased infusion rate. Red Man Syndrome     Outpatient Encounter Prescriptions as of 12/12/2017   Medication Sig Dispense Refill   • clindamycin (CLEOCIN) 300 MG Cap Take 300 mg by mouth 4 times a day.     • divalproex ER (DEPAKOTE ER) 500 MG TABLET SR 24 HR Take 1 Tab by mouth every evening. 30 Tab 0   • levetiracetam (KEPPRA) 500 MG Tab Take 1 Tab by mouth every day. 30 Tab 0   • risperidone (RISPERDAL) 1 MG Tab Take 1 mg by mouth 2 times a day.     • aspirin (ASA) 81 MG Chew Tab chewable tablet Take 81 mg by mouth every day.     • ondansetron (ZOFRAN ODT) 4 MG TABLET DISPERSIBLE Take 1 Tab by mouth every 8 hours as needed for Nausea/Vomiting. 30 Tab 11   • omeprazole (PRILOSEC) 20 MG " delayed-release capsule Take 1 Cap by mouth every day. 30 Cap 2   • gabapentin (NEURONTIN) 600 MG tablet Take 600 mg by mouth 3 times a day.     • oxycodone immediate release (ROXICODONE) 10 MG immediate release tablet Take 1 Tab by mouth every four hours as needed for Moderate Pain. 100 Tab 0   • duloxetine (CYMBALTA) 60 MG CPEP delayed-release capsule Take 60 mg by mouth 2 times a day.       No facility-administered encounter medications on file as of 12/12/2017.        Social History     Social History   • Marital status:      Spouse name: N/A   • Number of children: N/A   • Years of education: N/A     Occupational History   •       on disability     Social History Main Topics   • Smoking status: Never Smoker   • Smokeless tobacco: Never Used   • Alcohol use Yes      Comment: Rare   • Drug use: No   • Sexual activity: Not on file     Other Topics Concern   • Not on file     Social History Narrative   • No narrative on file      History   Smoking Status   • Never Smoker   Smokeless Tobacco   • Never Used     History   Alcohol Use   • Yes     Comment: Rare     History   Drug Use No      OB History   No data available      Patient's last menstrual period was 11/03/2017.    G P A L     Family History   Problem Relation Age of Onset   • No Known Problems Mother    • No Known Problems Father        Review of Systems   Constitutional: Negative for chills and fever.   Respiratory: Negative for cough and hemoptysis.    Gastrointestinal: Negative for abdominal pain.   Musculoskeletal: Positive for joint pain.        Hand swelling and joint pain   Skin: Positive for rash.             Neurological: Positive for seizures.        Objective:   /88   Pulse (!) 112   Temp 36.4 °C (97.5 °F)   Resp 12   Wt 76.7 kg (169 lb)   LMP 11/03/2017   SpO2 93%   Breastfeeding? No   BMI 29.94 kg/m²    Vitals:    12/12/17 1048   BP: 118/88   Pulse: (!) 112   Resp: 12   Temp: 36.4 °C (97.5 °F)   SpO2: 93%    Weight: 76.7 kg (169 lb)         Physical Exam   Constitutional: She is oriented to person, place, and time. She appears well-developed and well-nourished. No distress.   HENT:   Head: Normocephalic and atraumatic.   Right Ear: External ear normal.   Left Ear: External ear normal.   Eyes: Conjunctivae are normal. Pupils are equal, round, and reactive to light. Right eye exhibits no discharge. Left eye exhibits no discharge. No scleral icterus.   Pulmonary/Chest: Effort normal. No stridor. No respiratory distress.   Musculoskeletal: She exhibits no edema.   Left hand swollen with tenderness.  Right hand no swelling or tenderness to palpation.  Good ROM of her wrists bilateral flexion and extension.   No periarticular swelling of the MCP, PIP. Good ROM flexion and extension of elbows bilateral. .      Lymphadenopathy:     She has no cervical adenopathy.   Neurological: She is alert and oriented to person, place, and time.   Skin: Skin is warm and dry. She is not diaphoretic.   No alopecia.  On the dorsum of the hand just distal to the wrist ulceration about 1 cm in diameter with no drainage and mild surrounding erythema.   On the left foot there was a large necrotic lesion about 5cm in diameter with no drainage but swelling and bruising surrounding this area   Psychiatric: She has a normal mood and affect. Her behavior is normal. Judgment and thought content normal.       Assessment:     1. Streptococcal infection     2. Seronegative rheumatoid arthritis (CMS-HCC)     3. Vasculopathy       Labs:    Lab Results   Component Value Date/Time    QNTTBGOLD Negative 01/09/2017 06:01 PM     Lab Results   Component Value Date/Time    HEPBCORIGM Negative 05/21/2017 02:07 AM    HEPBSAG Negative 05/21/2017 02:07 AM     Lab Results   Component Value Date/Time    HEPCAB Negative 05/21/2017 02:07 AM     Lab Results   Component Value Date/Time    SODIUM 137 11/22/2017 01:53 AM    POTASSIUM 3.2 (L) 11/22/2017 01:53 AM    CHLORIDE  105 11/22/2017 01:53 AM    CO2 23 11/22/2017 01:53 AM    GLUCOSE 126 (H) 11/22/2017 01:53 AM    BUN 9 11/22/2017 01:53 AM    CREATININE 0.62 11/22/2017 01:53 AM      Lab Results   Component Value Date/Time    WBC 5.7 11/22/2017 01:53 AM    RBC 3.77 (L) 11/22/2017 01:53 AM    HEMOGLOBIN 11.5 (L) 11/22/2017 01:53 AM    HEMATOCRIT 35.8 (L) 11/22/2017 01:53 AM    MCV 95.0 11/22/2017 01:53 AM    MCH 30.5 11/22/2017 01:53 AM    MCHC 32.1 (L) 11/22/2017 01:53 AM    MPV 9.7 11/22/2017 01:53 AM    NEUTSPOLYS 63.40 11/22/2017 01:53 AM    LYMPHOCYTES 27.90 11/22/2017 01:53 AM    MONOCYTES 7.00 11/22/2017 01:53 AM    EOSINOPHILS 0.70 11/22/2017 01:53 AM    BASOPHILS 0.70 11/22/2017 01:53 AM    HYPOCHROMIA 1+ 01/11/2017 02:29 PM    ANISOCYTOSIS 1+ 01/11/2017 02:29 PM      Lab Results   Component Value Date/Time    CALCIUM 8.3 (L) 11/22/2017 01:53 AM    ASTSGOT 55 (H) 11/22/2017 01:53 AM    ALTSGPT 100 (H) 11/22/2017 01:53 AM    ALKPHOSPHAT 122 (H) 11/22/2017 01:53 AM    TBILIRUBIN 0.3 11/22/2017 01:53 AM    ALBUMIN 3.6 11/22/2017 01:53 AM    ALBUMIN 4.69 11/06/2017 10:23 AM    TOTPROTEIN 5.9 (L) 11/22/2017 01:53 AM    TOTPROTEIN 7.50 11/06/2017 10:23 AM     Lab Results   Component Value Date/Time    URICACID 4.3 09/15/2017 03:45 PM    RHEUMFACTN <10 10/14/2017 07:40 PM    ANTINUCAB None Detected 01/06/2017 04:12 AM     Lab Results   Component Value Date/Time    SEDRATEWES 19 11/18/2017 04:30 AM    CREACTPROT 0.27 11/18/2017 04:30 AM     Lab Results   Component Value Date/Time    RUSSELVIPER 40.3 09/15/2017 03:45 PM    DRVVTINTERP Not Present 09/15/2017 03:45 PM     Lab Results   Component Value Date/Time    Y0VOUBVFSEQ 178.0 01/06/2017 04:12 AM    V7JFFEVKHHH 37.0 01/06/2017 04:12 AM    IGGCARDIOLI 5 09/15/2017 03:45 PM    IGMCARDIOLI 6 09/15/2017 03:45 PM    IGACARDIOLI 2 09/15/2017 03:45 PM    CRYOGLOBULIN NEG 72Hour 04/17/2017 03:53 PM     Lab Results   Component Value Date/Time    BSTUSTE79 0 01/06/2017 04:12 AM     CENTROMBAB 2 01/06/2017 04:12 AM     Lab Results   Component Value Date/Time    ANCAIGG <1:20 01/06/2017 04:12 AM    A9VHHINLCMD 178.0 01/06/2017 04:12 AM    ANTISSBSJ 0 11/18/2017 04:30 AM     Lab Results   Component Value Date/Time    COLORURINE DK Yellow 10/15/2017 09:40 AM    SPECGRAVITY 1.010 10/15/2017 09:40 AM    PHURINE 5.5 10/15/2017 09:40 AM    GLUCOSEUR Negative 10/15/2017 09:40 AM    KETONES Negative 10/15/2017 09:40 AM    PROTEINURIN Negative 10/15/2017 09:40 AM     No results found for: TOTPROTUR, TOTALVOLUME, [id number]  Lab Results   Component Value Date/Time    SSA60 14 11/18/2017 04:30 AM    SSA52 0 11/18/2017 04:30 AM     Lab Results   Component Value Date/Time    HBA1C 5.0 11/16/2017 11:12 PM     Lab Results   Component Value Date/Time    CPKTOTAL 32 11/17/2017 04:43 AM     Lab Results   Component Value Date/Time    G6PD 20.4 (H) 01/10/2017 01:54 PM     Lab Results   Component Value Date/Time    IXMO31CVTQ Negative 10/14/2017 07:40 PM     No results found for: ACESERUM  Lab Results   Component Value Date/Time    25HYDROXY 45 01/09/2017 06:00 PM     No results found for: TSH, FREEDIR  Lab Results   Component Value Date/Time    TSHULTRASEN 1.980 11/16/2017 11:12 PM    FREET4 0.78 11/20/2017 03:00 AM     No results found for: MICROSOMALA, ANTITHYROGL  No results found for: IGGLYMEABS  No results found for: ANTIMITOCHO, FACTIN  No results found for: IGA, TTRANSIGA, ENDOIGA  No results found for: FLTYPE, CRYSTALSBDF  No results found for: ISTATICAL  No results found for: ISTATCREAT  No results found for: CTELOPEP  No results found for: GBMABG  No results found for: PTHINTACT  1/27/2015 hepatitis E IgM    1/27/2015 ceruloplasmin = 29.7  1/27/2015 smith antibody = 9 (normal)  1/27/2015 MG negative  1/27/2015 alpha antitrypsin 119 ()  1/27/2015 ferritin = 75 (normal)  1/27/2015 GGT = 327 (0-55)    1/27/2015 IgG 4 = 11  10/31/2016 and 1/27/2015  p ANCA (-), c ANCA (-), PR3 (-)  10/31/2016 MPO  (-)  12/7/2014 AST = 23 ALT = 137 Alk phos = 205  6/10/2016 ALT = 921 AST = 821 Alk phos = 537  Labs from JUne 2016 shows low albumin 2.7 and AST 92 and ALT of 370    6/11/2016; AST=92, ALT  = 370 Alk Phos = 331 Hgb = 8.6 MCV = 74.7 t bili = 1.2  10/15/2015: Hgb = 9.1 Plt =   ALT= 357 AST =127 Alk phos = 469 creatinine = 0.7    US LE venous doppler bilateral 6/30/2014  Probable baker's cyst on left lower extremity    Left hand XR 8/14/2014  Soft tissue swelling over the dorsum.  No erorsons no fractures     CT abdomen/Pelvis w/o Contrast 9/4/2014  Small amount of fluid in cul-de-sac no abnormalities in liver.  Abdominal aorta was normal.  Adrenal glands normal.  coritical medullatry calcifications noted of left kidney    CT head w w/o contrast 10/15/2016  No acute abnormalities and on CT maxillofacial w/ contrast - no evidence of sinusitis    CTchest w/con 1/12/2016  No findings to suggest aortic dissection or pulmonary embolus  Left renal 18 mm cyst in the ventral cortex medially    CT spine lumbar w/o contrast  8/30/2015  Small fluid  Collection posterior junction of the epidural catheter seen right lateral to the L2 posterior spinous process consistent with leakage and/or infection      Blood work performed at outside labs dated May 6, 2017 white blood cell count of 7 total protein is 7.8 albumin is 4.1 monocytes 1010.7 neutrophils were normal at 5.33 and lymphocytes were normal at 9 point correction 0.94 platelets were 233 HEENT hemoglobin  Labs from June 22, 2017 showed a hemoglobin of 14.3 AST was normalized at 27 ALT was elevated at 71 and phosphatase was normal at 180 transferrin saturation was normal at 15% creatinine was normal at 0.6  Transglutaminase IgA antibody was negative on June 22, 2017  Creatinine IgA antibody was pending. On June 22, 2017 endomysial IgA antibody was negative on June 22, 2017  White blood cell was 8.6 platelets were 423 on June 22, 2017  IgG total was 813 which is normal on June  22, 2017 IgA total was 130 which is normal and June 22, 2017  Ceruloplasmin was normal at 35.2 on June 22, 2017 and alpha-1 antitrypsin in the serum was 142 which is normal on June 22, 2017    TTG antibody was negative on June 22, 2017  IgG subclass  IgG 2 subclass was normal at 311, IgG4 subclass was normal at 8, IgG1 subclass was normal at 676 and IgG 3 subclass was slightly elevated at 122 (15-1 02)  Ferritin was normal at 43 on June 22, 2017 creatinine IgG antibody was negative on June 22, 2017. On May 6, 2017 AST was 31 and ALT was normal at 54    Medical Decision Making:  Today's Assessment / Status / Plan:     Skin ulcerations  ASO and anti DNASE was elevated  Since her hospitalization her ulcerations still persist  She is being followed by wound care    Seronegative rheumatoid arthritis with involvement of the small joints of the hands and wrists and periarticular osteopenia on xray. CT of the right  hand noted soft tissue swelling and concern for cellulitis.  Our challenge has been her unexplained recurrent transaminitis.  This has occurred in the past prior to starting methotrexate. Similarly her LFT increased with methotrexate use.  Will f/u on [place] recommendations.    Septal perforation  ANCA serologies are negative x 3  MG Is negative  Biopsy results did not show active disease  CXR did not note hilar adenopathy  Due to her recent hospitalization and findings by dermatology she was advised to avoid her biopsy and so that has been cancelled.    While we're concerned about ANCA vasculitis I have not had any evidence.   I discussed her biopsy with Dermatology and there does not appear to be evidence of vasculitis  At last visit we checked ASO and anti DNASE.  Labs were positive and she will see ID in follow-up tomorrow    Cellulitis  Treated with doxycycline.    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   If ID feels infection is controlled we could try prednisone 5 mg po q day    Abnormal LFT  stable     1. Streptococcal infection     2. Seronegative rheumatoid arthritis (CMS-HCC)     3. Vasculopathy       Return for after visit with [place].    I have spent greater than 50% of this 30 minute visit in counseling/coordination of care with the patient regarding therapy plan    She agreed and verbalized her agreement and understanding with the current plan. I answered all questions and concerns she has at this time and advised her to call at any time in there interim with questions or concerns in regards to her care.    Thank you for allowing me to participate in her care, I will continue to follow closely.

## 2017-12-12 ENCOUNTER — OFFICE VISIT (OUTPATIENT)
Dept: RHEUMATOLOGY | Facility: PHYSICIAN GROUP | Age: 47
End: 2017-12-12
Payer: MEDICARE

## 2017-12-12 VITALS
OXYGEN SATURATION: 93 % | TEMPERATURE: 97.5 F | DIASTOLIC BLOOD PRESSURE: 88 MMHG | WEIGHT: 169 LBS | SYSTOLIC BLOOD PRESSURE: 118 MMHG | HEART RATE: 112 BPM | BODY MASS INDEX: 29.94 KG/M2 | RESPIRATION RATE: 12 BRPM

## 2017-12-12 DIAGNOSIS — I99.9 VASCULOPATHY: ICD-10-CM

## 2017-12-12 DIAGNOSIS — A49.1 STREPTOCOCCAL INFECTION: ICD-10-CM

## 2017-12-12 DIAGNOSIS — M06.00 SERONEGATIVE RHEUMATOID ARTHRITIS (HCC): ICD-10-CM

## 2017-12-12 PROCEDURE — 99214 OFFICE O/P EST MOD 30 MIN: CPT | Performed by: INTERNAL MEDICINE

## 2017-12-12 RX ORDER — CLINDAMYCIN HYDROCHLORIDE 300 MG/1
300 CAPSULE ORAL 4 TIMES DAILY
Status: ON HOLD | COMMUNITY
End: 2018-04-09

## 2017-12-12 ASSESSMENT — ENCOUNTER SYMPTOMS: SEIZURES: 1

## 2017-12-12 NOTE — LETTER
[facility]   [address], [address]  [place] [address]  Phone: [phone]  Fax: [phone]              Encounter Date: 12/12/2017    Dear Dr. Power,      It was a pleasure seeing your patient, Enedelia Pang, on 12/12/2017. Diagnoses of Streptococcal infection, Seronegative rheumatoid arthritis (CMS-[facility]), and Vasculopathy were pertinent to this visit.     Please find attached progress note which includes the history I obtained from Ms. Pang, my physical examination findings, my impression and recommendations.      Once again, it was a pleasure participating in your patient's care.  Please feel free to contact me if you have any questions or if I can be of any further assistance to your patients.      Sincerely,    Aimee Andrade M.D.  Electronically Signed          PROGRESS NOTE:  Subjective:   Date of Consultation:12/12/2017 10:43 AM  Primary care physician:Elliott Power M.D.    Reason for Consultation:  Ms. Pang  is a pleasant 46 y.o. year old female who presented with follow-up seronegative Rheumatoid arthritis    Seronegative Rheumatoid Arthritis, non erosive  She is off prednisone and notes increasing hand pain.  She has an upcoming appointment with [place] January 20th    Hand swelling  She was started on antibiotics by ID since she was seen at [facility] for foot infection after dropping an oxygen tank on her foot.      Fever ()  Afternoon, early evening in the morning  Can last a few hours  Yes for night sweats sometimes drenching.     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  Dermatology started her on aspirin.  Antiphospholipids are negative.  SHe has also been referred to hematology    Migraines  Responded well to botox injections  Adding oxygen at night  helped minimize her headache  She developed a seizure in November and was started on depakote.      RHEUM HISTORY:  Ms. Enedelia Pang presented in January 2016 with joint pain and swelling of hands,knees, and weakness and a 3 month history of septal perforation.  ENT initial evaluation noted no disease activity.  She also has a history of abnormal LFT with unclear etiology.  She has had a work-up with GI in early 2017 which was unrevealing.  Also she has had a recurrent rash described as papules that ulcerate identified as MRSA infections.   Her labs did show thrombocytosis which could be inflammatory and anemia which was determined to be iron deficiency anemia.  Her rheumatologic work-up showed RF(-), MG (-), ANCA (-), SCL 70 (-) and anti centromere (-)  Parvovirus and rubella IgM was negative    Hand xrays did show periarticular osteopenia.    1/9/2017  Hepatitis B surface antigen negative  Hepatitis B core antibody negative  Hepatitis C antibody negative  quantiferon negative   HIV negative     CXR 12/6/2017 was negative for edema     11/2016 Nasal biopsy did not show active disease    Previous Medication:  Sulfasalazine 1000 mg BID (off)  Methotrexate 5 tabs weekly q Thursday (off)  Folic acid  Off prednisone    Epistaxis with nasal perforation  Not addressed    Elevated liver enzymes  Off methotrexate due to elevated LFT.    Stable  She has had a liver biopsy 7/2017.  The liver biopsy suggests it is due to methotrexate toxicity however her elevated LFT had been ongoing prior to start of methotrexate    Chronic diarrhea  Had a capsule endoscopy - results are pending.    Pseudotumor cerebri  She is being followed by Dr. Berry and Erkia Reece and had an injection on 8/16/2017    Iron deficiency anemia  MCV is elevated which could be secondary to her methotrexate  Her anemia has also resolved.    Renal cysts  Bilateral  Plan to see nephrology    History of retinal disease  She did see ophthalmology     Past  "Medical History:  Pseudotumor cerebri, MRSA infection history, CVA, miraines, pituatary tumor, migraine headaches, septal perforation. No history of blood clots    Past Medical History:   Diagnosis Date   • Allergy, unspecified not elsewhere classified    • Anemia 10/05/2017    Unsure if a current issue.   • Anxiety    • autoimmune    • Depression    • H/O Clostridium difficile infection    • Hx MRSA infection    • Hydrocephalus     with lumbar shunt   • Migraine with aura, with intractable migraine, so stated, without mention of status migrainosus    • MRSA (methicillin resistant Staphylococcus aureus)    • Pituitary abnormality (CMS-HCC)    • Staph infection    • Stroke (CMS-HCC)     2007     Past Surgical History:   Procedure Laterality Date   •  SHUNT INSERTION  5/31/2017    Procedure:  SHUNT INSERTION;  Surgeon: Drew Berry M.D.;  Location: SURGERY [facility];  Service:    • SPINAL CORD STIMULATOR  12/19/2016    Procedure: SPINAL CORD STIMULATOR FOR: PLACEMENT OF LEFT FLANK OCCIPITAL NERVE STIMULATOR BATTERY PACK;  Surgeon: Oli Oh III, M.D.;  Location: SURGERY [facility];  Service:    • LUMBAR EXPLORATION N/A 8/31/2015    Procedure: LUMBAR EXPLORATION- Lumbar shunt removal ;  Surgeon: Oli Oh III, M.D.;  Location: SURGERY [facility];  Service:    • IRRIGATION & DEBRIDEMENT NEURO N/A 6/28/2015    Procedure: IRRIGATION & DEBRIDEMENT NEURO;  Surgeon: Oli Oh III, M.D.;  Location: SURGERY [facility];  Service:    • APPENDECTOMY     • CHOLECYSTECTOMY     • OTHER      right knee surgery   • OTHER NEUROLOGICAL SURG      occipital nerve stimulator   • TONSILLECTOMY     • TUBAL LIGATION       Allergies   Allergen Reactions   • Prochlorperazine Swelling     Tongue swelling. Reaction as a teen. (compazine)  Tolerated Phenergan on 02/24/15   • Sumatriptan Unspecified     Historical=\"Heart stops.\" Reaction  between 1995 to 1997   • Vancomycin Shortness of Breath and Rash     " Reaction in 2005.  D/W patient 8/31/15 - tolerated loading dose of vancomycin administered on 8/30/15 with some itching to chest with decreased infusion rate. Red Man Syndrome     Outpatient Encounter Prescriptions as of 12/12/2017   Medication Sig Dispense Refill   • clindamycin (CLEOCIN) 300 MG Cap Take 300 mg by mouth 4 times a day.     • divalproex ER (DEPAKOTE ER) 500 MG TABLET SR 24 HR Take 1 Tab by mouth every evening. 30 Tab 0   • levetiracetam (KEPPRA) 500 MG Tab Take 1 Tab by mouth every day. 30 Tab 0   • risperidone (RISPERDAL) 1 MG Tab Take 1 mg by mouth 2 times a day.     • aspirin (ASA) 81 MG Chew Tab chewable tablet Take 81 mg by mouth every day.     • ondansetron (ZOFRAN ODT) 4 MG TABLET DISPERSIBLE Take 1 Tab by mouth every 8 hours as needed for Nausea/Vomiting. 30 Tab 11   • omeprazole (PRILOSEC) 20 MG delayed-release capsule Take 1 Cap by mouth every day. 30 Cap 2   • gabapentin (NEURONTIN) 600 MG tablet Take 600 mg by mouth 3 times a day.     • oxycodone immediate release (ROXICODONE) 10 MG immediate release tablet Take 1 Tab by mouth every four hours as needed for Moderate Pain. 100 Tab 0   • duloxetine (CYMBALTA) 60 MG CPEP delayed-release capsule Take 60 mg by mouth 2 times a day.       No facility-administered encounter medications on file as of 12/12/2017.        Social History     Social History   • Marital status:      Spouse name: N/A   • Number of children: N/A   • Years of education: N/A     Occupational History   •       on disability     Social History Main Topics   • Smoking status: Never Smoker   • Smokeless tobacco: Never Used   • Alcohol use Yes      Comment: Rare   • Drug use: No   • Sexual activity: Not on file     Other Topics Concern   • Not on file     Social History Narrative   • No narrative on file      History   Smoking Status   • Never Smoker   Smokeless Tobacco   • Never Used     History   Alcohol Use   • Yes     Comment: Rare     History   Drug Use  No      OB History   No data available      Patient's last menstrual period was 11/03/2017.    G P A L     Family History   Problem Relation Age of Onset   • No Known Problems Mother    • No Known Problems Father        Review of Systems   Constitutional: Negative for chills and fever.   Respiratory: Negative for cough and hemoptysis.    Gastrointestinal: Negative for abdominal pain.   Musculoskeletal: Positive for joint pain.        Hand swelling and joint pain   Skin: Positive for rash.             Neurological: Positive for seizures.        Objective:   /88   Pulse (!) 112   Temp 36.4 °C (97.5 °F)   Resp 12   Wt 76.7 kg (169 lb)   LMP 11/03/2017   SpO2 93%   Breastfeeding? No   BMI 29.94 kg/m²     Vitals:    12/12/17 1048   BP: 118/88   Pulse: (!) 112   Resp: 12   Temp: 36.4 °C (97.5 °F)   SpO2: 93%   Weight: 76.7 kg (169 lb)         Physical Exam   Constitutional: She is oriented to person, place, and time. She appears well-developed and well-nourished. No distress.   HENT:   Head: Normocephalic and atraumatic.   Right Ear: External ear normal.   Left Ear: External ear normal.   Eyes: Conjunctivae are normal. Pupils are equal, round, and reactive to light. Right eye exhibits no discharge. Left eye exhibits no discharge. No scleral icterus.   Pulmonary/Chest: Effort normal. No stridor. No respiratory distress.   Musculoskeletal: She exhibits no edema.   Left hand swollen with tenderness.  Right hand no swelling or tenderness to palpation.  Good ROM of her wrists bilateral flexion and extension.   No periarticular swelling of the MCP, PIP. Good ROM flexion and extension of elbows bilateral. .      Lymphadenopathy:     She has no cervical adenopathy.   Neurological: She is alert and oriented to person, place, and time.   Skin: Skin is warm and dry. She is not diaphoretic.   No alopecia.  On the dorsum of the hand just distal to the wrist ulceration about 1 cm in diameter with no drainage and mild  surrounding erythema.   On the left foot there was a large necrotic lesion about 5cm in diameter with no drainage but swelling and bruising surrounding this area   Psychiatric: She has a normal mood and affect. Her behavior is normal. Judgment and thought content normal.       Assessment:     1. Streptococcal infection     2. Seronegative rheumatoid arthritis (CMS-HCC)     3. Vasculopathy       Labs:    Lab Results   Component Value Date/Time    QNTTBGOLD Negative 01/09/2017 06:01 PM     Lab Results   Component Value Date/Time    HEPBCORIGM Negative 05/21/2017 02:07 AM    HEPBSAG Negative 05/21/2017 02:07 AM     Lab Results   Component Value Date/Time    HEPCAB Negative 05/21/2017 02:07 AM     Lab Results   Component Value Date/Time    SODIUM 137 11/22/2017 01:53 AM    POTASSIUM 3.2 (L) 11/22/2017 01:53 AM    CHLORIDE 105 11/22/2017 01:53 AM    CO2 23 11/22/2017 01:53 AM    GLUCOSE 126 (H) 11/22/2017 01:53 AM    BUN 9 11/22/2017 01:53 AM    CREATININE 0.62 11/22/2017 01:53 AM      Lab Results   Component Value Date/Time    WBC 5.7 11/22/2017 01:53 AM    RBC 3.77 (L) 11/22/2017 01:53 AM    HEMOGLOBIN 11.5 (L) 11/22/2017 01:53 AM    HEMATOCRIT 35.8 (L) 11/22/2017 01:53 AM    MCV 95.0 11/22/2017 01:53 AM    MCH 30.5 11/22/2017 01:53 AM    MCHC 32.1 (L) 11/22/2017 01:53 AM    MPV 9.7 11/22/2017 01:53 AM    NEUTSPOLYS 63.40 11/22/2017 01:53 AM    LYMPHOCYTES 27.90 11/22/2017 01:53 AM    MONOCYTES 7.00 11/22/2017 01:53 AM    EOSINOPHILS 0.70 11/22/2017 01:53 AM    BASOPHILS 0.70 11/22/2017 01:53 AM    HYPOCHROMIA 1+ 01/11/2017 02:29 PM    ANISOCYTOSIS 1+ 01/11/2017 02:29 PM      Lab Results   Component Value Date/Time    CALCIUM 8.3 (L) 11/22/2017 01:53 AM    ASTSGOT 55 (H) 11/22/2017 01:53 AM    ALTSGPT 100 (H) 11/22/2017 01:53 AM    ALKPHOSPHAT 122 (H) 11/22/2017 01:53 AM    TBILIRUBIN 0.3 11/22/2017 01:53 AM    ALBUMIN 3.6 11/22/2017 01:53 AM    ALBUMIN 4.69 11/06/2017 10:23 AM    TOTPROTEIN 5.9 (L) 11/22/2017 01:53 AM     TOTPROTEIN 7.50 11/06/2017 10:23 AM     Lab Results   Component Value Date/Time    URICACID 4.3 09/15/2017 03:45 PM    RHEUMFACTN <10 10/14/2017 07:40 PM    ANTINUCAB None Detected 01/06/2017 04:12 AM     Lab Results   Component Value Date/Time    SEDRATEWES 19 11/18/2017 04:30 AM    CREACTPROT 0.27 11/18/2017 04:30 AM     Lab Results   Component Value Date/Time    RUSSELVIPER 40.3 09/15/2017 03:45 PM    DRVVTINTERP Not Present 09/15/2017 03:45 PM     Lab Results   Component Value Date/Time    C1UEUOLGPPT 178.0 01/06/2017 04:12 AM    G7RKESIJXDZ 37.0 01/06/2017 04:12 AM    IGGCARDIOLI 5 09/15/2017 03:45 PM    IGMCARDIOLI 6 09/15/2017 03:45 PM    IGACARDIOLI 2 09/15/2017 03:45 PM    CRYOGLOBULIN NEG 72Hour 04/17/2017 03:53 PM     Lab Results   Component Value Date/Time    HFRLQKO70 0 01/06/2017 04:12 AM    CENTROMBAB 2 01/06/2017 04:12 AM     Lab Results   Component Value Date/Time    ANCAIGG <1:20 01/06/2017 04:12 AM    D0JLLWWSRTE 178.0 01/06/2017 04:12 AM    ANTISSBSJ 0 11/18/2017 04:30 AM     Lab Results   Component Value Date/Time    COLORURINE DK Yellow 10/15/2017 09:40 AM    SPECGRAVITY 1.010 10/15/2017 09:40 AM    PHURINE 5.5 10/15/2017 09:40 AM    GLUCOSEUR Negative 10/15/2017 09:40 AM    KETONES Negative 10/15/2017 09:40 AM    PROTEINURIN Negative 10/15/2017 09:40 AM     No results found for: TOTPROTUR, TOTALVOLUME, EXTPRQLJ29  Lab Results   Component Value Date/Time    SSA60 14 11/18/2017 04:30 AM    SSA52 0 11/18/2017 04:30 AM     Lab Results   Component Value Date/Time    HBA1C 5.0 11/16/2017 11:12 PM     Lab Results   Component Value Date/Time    CPKTOTAL 32 11/17/2017 04:43 AM     Lab Results   Component Value Date/Time    G6PD 20.4 (H) 01/10/2017 01:54 PM     Lab Results   Component Value Date/Time    TZCM39SEGX Negative 10/14/2017 07:40 PM     No results found for: ACESERUM  Lab Results   Component Value Date/Time    25HYDROXY 45 01/09/2017 06:00 PM     No results found for: TSH, FREEDIR  Lab  Results   Component Value Date/Time    TSHULTRASEN 1.980 11/16/2017 11:12 PM    FREET4 0.78 11/20/2017 03:00 AM     No results found for: MICROSOMALA, ANTITHYROGL  No results found for: IGGLYMEABS  No results found for: ANTIMITOCHO, FACTIN  No results found for: IGA, TTRANSIGA, ENDOIGA  No results found for: FLTYPE, CRYSTALSBDF  No results found for: ISTATICAL  No results found for: ISTATCREAT  No results found for: CTELOPEP  No results found for: GBMABG  No results found for: PTHINTACT  1/27/2015 hepatitis E IgM    1/27/2015 ceruloplasmin = 29.7  1/27/2015 smith antibody = 9 (normal)  1/27/2015 MG negative  1/27/2015 alpha antitrypsin 119 ()  1/27/2015 ferritin = 75 (normal)  1/27/2015 GGT = 327 (0-55)    1/27/2015 IgG 4 = 11  10/31/2016 and 1/27/2015  p ANCA (-), c ANCA (-), PR3 (-)  10/31/2016 MPO (-)  12/7/2014 AST = 23 ALT = 137 Alk phos = 205  6/10/2016 ALT = 921 AST = 821 Alk phos = 537  Labs from JUne 2016 shows low albumin 2.7 and AST 92 and ALT of 370    6/11/2016; AST=92, ALT  = 370 Alk Phos = 331 Hgb = 8.6 MCV = 74.7 t bili = 1.2  10/15/2015: Hgb = 9.1 Plt =   ALT= 357 AST =127 Alk phos = 469 creatinine = 0.7    US LE venous doppler bilateral 6/30/2014  Probable baker's cyst on left lower extremity    Left hand XR 8/14/2014  Soft tissue swelling over the dorsum.  No erorsons no fractures     CT abdomen/Pelvis w/o Contrast 9/4/2014  Small amount of fluid in cul-de-sac no abnormalities in liver.  Abdominal aorta was normal.  Adrenal glands normal.  coritical medullatry calcifications noted of left kidney    CT head w w/o contrast 10/15/2016  No acute abnormalities and on CT maxillofacial w/ contrast - no evidence of sinusitis    CTchest w/con 1/12/2016  No findings to suggest aortic dissection or pulmonary embolus  Left renal 18 mm cyst in the ventral cortex medially    CT spine lumbar w/o contrast  8/30/2015  Small fluid  Collection posterior junction of the epidural catheter seen right lateral to  the L2 posterior spinous process consistent with leakage and/or infection      Blood work performed at outside labs dated May 6, 2017 white blood cell count of 7 total protein is 7.8 albumin is 4.1 monocytes 1010.7 neutrophils were normal at 5.33 and lymphocytes were normal at 9 point correction 0.94 platelets were 233 HEENT hemoglobin  Labs from June 22, 2017 showed a hemoglobin of 14.3 AST was normalized at 27 ALT was elevated at 71 and phosphatase was normal at 180 transferrin saturation was normal at 15% creatinine was normal at 0.6  Transglutaminase IgA antibody was negative on June 22, 2017  Creatinine IgA antibody was pending. On June 22, 2017 endomysial IgA antibody was negative on June 22, 2017  White blood cell was 8.6 platelets were 423 on June 22, 2017  IgG total was 813 which is normal on June 22, 2017 IgA total was 130 which is normal and June 22, 2017  Ceruloplasmin was normal at 35.2 on June 22, 2017 and alpha-1 antitrypsin in the serum was 142 which is normal on June 22, 2017    TTG antibody was negative on June 22, 2017  IgG subclass  IgG 2 subclass was normal at 311, IgG4 subclass was normal at 8, IgG1 subclass was normal at 676 and IgG 3 subclass was slightly elevated at 122 (15-1 02)  Ferritin was normal at 43 on June 22, 2017 creatinine IgG antibody was negative on June 22, 2017. On May 6, 2017 AST was 31 and ALT was normal at 54    Medical Decision Making:  Today's Assessment / Status / Plan:     Skin ulcerations  ASO and anti DNASE was elevated  Since her hospitalization her ulcerations still persist  She is being followed by wound care    Seronegative rheumatoid arthritis with involvement of the small joints of the hands and wrists and periarticular osteopenia on xray. CT of the right  hand noted soft tissue swelling and concern for cellulitis.  Our challenge has been her unexplained recurrent transaminitis.  This has occurred in the past prior to starting methotrexate. Similarly her LFT  increased with methotrexate use.  Will f/u on [facility] recommendations.    Septal perforation  ANCA serologies are negative x 3  MG Is negative  Biopsy results did not show active disease  CXR did not note hilar adenopathy  Due to her recent hospitalization and findings by dermatology she was advised to avoid her biopsy and so that has been cancelled.    While we're concerned about ANCA vasculitis I have not had any evidence.   I discussed her biopsy with Dermatology and there does not appear to be evidence of vasculitis  At last visit we checked ASO and anti DNASE.  Labs were positive and she will see ID in follow-up tomorrow    Cellulitis  Treated with doxycycline.    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   If ID feels infection is controlled we could try prednisone 5 mg po q day    Abnormal LFT  stable     1. Streptococcal infection     2. Seronegative rheumatoid arthritis (CMS-HCC)     3. Vasculopathy       Return for after visit with [facility].    I have spent greater than 50% of this 30 minute visit in counseling/coordination of care with the patient regarding therapy plan    She agreed and verbalized her agreement and understanding with the current plan. I answered all questions and concerns she has at this time and advised her to call at any time in there interim with questions or concerns in regards to her care.    Thank you for allowing me to participate in her care, I will continue to follow closely.

## 2017-12-14 ENCOUNTER — OFFICE VISIT (OUTPATIENT)
Dept: INFECTIOUS DISEASES | Facility: MEDICAL CENTER | Age: 47
End: 2017-12-14
Payer: MEDICARE

## 2017-12-14 VITALS
SYSTOLIC BLOOD PRESSURE: 120 MMHG | OXYGEN SATURATION: 92 % | HEIGHT: 63 IN | HEART RATE: 123 BPM | BODY MASS INDEX: 29.7 KG/M2 | DIASTOLIC BLOOD PRESSURE: 80 MMHG | WEIGHT: 167.6 LBS | TEMPERATURE: 98.5 F

## 2017-12-14 DIAGNOSIS — L97.522 SKIN ULCER OF LEFT FOOT WITH FAT LAYER EXPOSED (HCC): ICD-10-CM

## 2017-12-14 DIAGNOSIS — A04.72 INTESTINAL INFECTION DUE TO CLOSTRIDIUM DIFFICILE: ICD-10-CM

## 2017-12-14 DIAGNOSIS — M06.9 RHEUMATOID ARTHRITIS, INVOLVING UNSPECIFIED SITE, UNSPECIFIED RHEUMATOID FACTOR PRESENCE: Chronic | ICD-10-CM

## 2017-12-14 DIAGNOSIS — B37.31 CANDIDA VAGINITIS: ICD-10-CM

## 2017-12-14 DIAGNOSIS — R79.89 PROLACTIN INCREASED: Chronic | ICD-10-CM

## 2017-12-14 PROCEDURE — 99214 OFFICE O/P EST MOD 30 MIN: CPT | Performed by: INTERNAL MEDICINE

## 2017-12-14 RX ORDER — FLUCONAZOLE 100 MG/1
200 TABLET ORAL DAILY
Qty: 1 TAB | Refills: 0 | Status: ON HOLD | OUTPATIENT
Start: 2017-12-14 | End: 2018-04-09

## 2017-12-14 NOTE — PROGRESS NOTES
Chief Complaint   Patient presents with   • Follow-Up     Streptococcal infection       HISTORY OF PRESENT ILLNESS: Patient is a 47 y.o. female established patient who presents today for elevated ASO titer. Known to ID from prior port infection in 2015 Port since removed. +chronobacter and MSSA  Since January 2016 + joint pain and swelling of hands,knees, and weakness and septal perforation.  ENT initial evaluation noted no disease activity.  No h/o illicit drug use.  She also has a history of abnormal LFT with unclear etiology.  Work-up with GI in early 2017 which was unrevealing.   She has had a recurrent rash described as papules/bullae that ulcerate. Sometimes are associated with surrounding halo or erythema. S/p biopsy-results are pending  Hep C neg. HIV neg  h/o MRSA infections.     Her rheumatologic work-up showed RF(-), MG (-), ANCA (-), SCL 70 (-) and anti centromere (-)  Parvovirus and rubella IgM was negative   Pictures reviewed-skin lesions bullous then ulcerate. Sometimes associated with erythema.  Sometimes antecedent ecchymosis or halo  Has  shunt    11/2  DW Rheum yesterday Dr Andrade  Prior hosp admission reviewed  ASO titer not done as patient requested to be done in the hospital and it was not done as requested  Problems with bilateral hand swelling-has punched out approx 1 cm lesions/ulcerations dorsum of both hands  No erythema or warmth  No fever or chills-swelling increased after stopping steroids    12/14  BUE lesions nearly healed-biopsy deferred by Derm. ASO not done  Admitted to [facility] for foot lesion-occurred very suddenly  Apparently swab +group B strep-treated with clinda  No current cellulitis/diarrhea  Has wound care f/u-still painful  Has yeast infection now from abx    Patient Active Problem List    Diagnosis Date Noted   • Seizures (CMS-[facility]) 11/17/2017     Priority: High   • Status migrainosus 05/21/2017     Priority: High   • Suicidal ideation 04/30/2017     Priority: High    • Chest pain 04/29/2017     Priority: High   • Swelling of joint of hand 01/06/2017     Priority: High   • Septic shock (CMS-HCC) 08/31/2015     Priority: High   • Back pain 06/28/2015     Priority: High   • R Hemiparesis 10/29/2014     Priority: High   • Opiate withdrawal (CMS-HCC) 11/19/2017     Priority: Medium   • Pseudotumor cerebri 11/17/2017     Priority: Medium   • Chronic migraine 07/13/2017     Priority: Medium   • Whole body pain 05/22/2017     Priority: Medium   • Nasal congestion 05/22/2017     Priority: Medium   • Diarrhea 05/21/2017     Priority: Medium   • LFT elevation 05/21/2017     Priority: Medium   • Seronegative rheumatoid arthritis (CMS-HCC) 01/17/2017     Priority: Medium   • Anxiety 06/29/2015     Priority: Medium   • Wounds, multiple 04/23/2015     Priority: Medium   • Intractable migraine 03/21/2015     Priority: Medium   • Osteomyelitis of finger of left hand (CMS-HCC) 10/30/2014     Priority: Medium   • Complicated migraine 10/29/2014     Priority: Medium   • Intestinal infection due to Clostridium difficile 10/10/2014     Priority: Medium   • Chronic pain syndrome 12/15/2016     Priority: Low   • History of skin ulcer 10/15/2017   • Nausea and vomiting 05/26/2017   • Positive blood cultures 05/26/2017   • Rheumatoid arthritis(714.0) 05/22/2017   • Hyperlipidemia 05/01/2017   • Metabolic acidosis 05/01/2017   • Rheumatoid arthritis (CMS-HCC) 05/01/2017   • H/O: CVA (cerebrovascular accident) 04/30/2017   • Chronic pain disorder 12/19/2016   • Migraine 10/04/2015   • Chronic pain 10/04/2015   • Bacteremia 10/04/2015   • Infected venous access port 10/03/2015   • Normocytic anemia 08/31/2015   • Thrombocytopenia (CMS-HCC) 08/31/2015   • Hypokalemia 08/30/2015   • Lactic acidosis 08/30/2015   • Hypomagnesemia 08/30/2015   • Leukocytosis 08/30/2015   • Sepsis (CMS-HCC) 08/30/2015   • Intractable low back pain 08/30/2015   • Hypocalcemia 08/30/2015   • Headache 07/03/2015   • Abdominal pain  07/03/2015   • S/P  shunt 06/29/2015   • Depression 06/29/2015   • SIRS (systemic inflammatory response syndrome) (CMS-HCC) 06/28/2015   • Hydrocephalus 04/23/2015   • Dermatitis- chronic 03/24/2015   • Low blood pressure reading 03/22/2015   • Elevated ALT 11/04/2014   • Paresthesia of right arm 10/09/2014       Allergies:Prochlorperazine; Sumatriptan; and Vancomycin    Current Outpatient Prescriptions   Medication Sig Dispense Refill   • clindamycin (CLEOCIN) 300 MG Cap Take 300 mg by mouth 4 times a day.     • divalproex ER (DEPAKOTE ER) 500 MG TABLET SR 24 HR Take 1 Tab by mouth every evening. 30 Tab 0   • levetiracetam (KEPPRA) 500 MG Tab Take 1 Tab by mouth every day. 30 Tab 0   • risperidone (RISPERDAL) 1 MG Tab Take 1 mg by mouth 2 times a day.     • aspirin (ASA) 81 MG Chew Tab chewable tablet Take 81 mg by mouth every day.     • ondansetron (ZOFRAN ODT) 4 MG TABLET DISPERSIBLE Take 1 Tab by mouth every 8 hours as needed for Nausea/Vomiting. 30 Tab 11   • omeprazole (PRILOSEC) 20 MG delayed-release capsule Take 1 Cap by mouth every day. 30 Cap 2   • gabapentin (NEURONTIN) 600 MG tablet Take 600 mg by mouth 3 times a day.     • oxycodone immediate release (ROXICODONE) 10 MG immediate release tablet Take 1 Tab by mouth every four hours as needed for Moderate Pain. 100 Tab 0   • duloxetine (CYMBALTA) 60 MG CPEP delayed-release capsule Take 60 mg by mouth 2 times a day.       No current facility-administered medications for this visit.        Social History   Substance Use Topics   • Smoking status: Never Smoker   • Smokeless tobacco: Never Used   • Alcohol use Yes      Comment: Rare       ROS:  Review of Systems   Constitutional: Negative for fever, chills, weight loss and malaise/fatigue.   HENT: Negative for ear pain, nosebleeds, congestion, sore throat and neck pain.      All other systems reviewed and are negative except as in HPI.      Exam:  Blood pressure 120/80, pulse (!) 123, temperature 36.9 °C  "(98.5 °F), height 1.6 m (5' 2.99\"), weight 76 kg (167 lb 9.6 oz), last menstrual period 11/14/2017, SpO2 92 %.  General:  Well nourished, well developed female in NAD. Pleasant and cooperative  Head: NCAT, Pupils are equal round reactive to light. Extraocular movements intact. Oropharynx is clear.  Neck: Supple.  No LAD  Pulmonary: Clear to ausculation.  No rales, rhonchi, or wheezing. Unlabored  Cardiovascular: Regular rate and rhythm without murmur. No ectopy  Abdominal: Soft, nontender, nondistended. Positive bowel sounds.   Skin: No rashes.  Extremities: no clubbing, cyanosis +bilateral swelling hands/wrists. Larger ulceration left foot, dorsum 2cm X3 cmX 0.5 cm deep. Punched out appearance-. Bilateral hand lesions healed  Neurologic: Alert and oriented x4. Speech is fluent without dysarthria. Cranial nerves intact. Moving all extremities. Gait using cane      Assessment/Plan:  1. Skin ulcer of left foot with fat layer exposed (CMS-HCC)      By report swab + Group B strep-treated. Follow with wound care-still would like diagnostic biopsy   2. Intestinal infection due to Clostridium difficile      Monitor for relapse-given clinda by [facility]   3. Rheumatoid arthritis, involving unspecified site, unspecified rheumatoid factor presence (CMS-HCC)      Suspect vasculitic skin ulcerations-biopsy recommended as above   4. 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 Per PCP        Marissa Garner M.D.  "

## 2018-02-08 ENCOUNTER — OFFICE VISIT (OUTPATIENT)
Dept: RHEUMATOLOGY | Facility: PHYSICIAN GROUP | Age: 48
End: 2018-02-08
Payer: COMMERCIAL

## 2018-02-08 ENCOUNTER — TELEPHONE (OUTPATIENT)
Dept: RHEUMATOLOGY | Facility: PHYSICIAN GROUP | Age: 48
End: 2018-02-08

## 2018-02-08 VITALS
WEIGHT: 168 LBS | SYSTOLIC BLOOD PRESSURE: 120 MMHG | TEMPERATURE: 97.9 F | DIASTOLIC BLOOD PRESSURE: 60 MMHG | HEART RATE: 102 BPM | RESPIRATION RATE: 14 BRPM | OXYGEN SATURATION: 94 % | BODY MASS INDEX: 29.77 KG/M2

## 2018-02-08 DIAGNOSIS — Z79.899 ENCOUNTER FOR LONG-TERM (CURRENT) USE OF HIGH-RISK MEDICATION: ICD-10-CM

## 2018-02-08 DIAGNOSIS — R60.9 RS3PE SYNDROME: ICD-10-CM

## 2018-02-08 DIAGNOSIS — M65.88 RS3PE SYNDROME: ICD-10-CM

## 2018-02-08 PROCEDURE — 99214 OFFICE O/P EST MOD 30 MIN: CPT | Performed by: INTERNAL MEDICINE

## 2018-02-08 RX ORDER — PREDNISONE 5 MG/1
TABLET ORAL
Qty: 60 TAB | Refills: 0 | Status: SHIPPED | OUTPATIENT
Start: 2018-02-08 | End: 2018-03-15

## 2018-02-08 NOTE — TELEPHONE ENCOUNTER
FYI- Effective 1/4/2018, this pt is now on [facility] Medicare Advantage Plan. (OUT OF NETWORK)  I explained to the pt that we are NOT contracted with this Insurance and had her sign the Out of Network waiver form. Pt also has [employer] as her supplemental insurance.  I informed Debbie of this change, as I know we are only contracted with [employer] now. She said that we can no longer continue to see this pt under her current plan. Pt will need to be notified and any future appointments within [facility] must be cancelled.

## 2018-02-08 NOTE — LETTER
[facility]   [address], [address]  [place] [address]  Phone: [phone]  Fax: [phone]              Encounter Date: 2/8/2018    Dear Dr. Florence ref. provider found,    It was a pleasure seeing your patient, Enedelia Pang, on 2/8/2018. Diagnoses of Encounter for long-term (current) use of high-risk medication and RS3PE syndrome were pertinent to this visit.     Please find attached progress note which includes the history I obtained from Ms. Pang, my physical examination findings, my impression and recommendations.      Once again, it was a pleasure participating in your patient's care.  Please feel free to contact me if you have any questions or if I can be of any further assistance to your patients.      Sincerely,    Aimee Andrade M.D.  Electronically Signed          PROGRESS NOTE:  Subjective:   Date of Consultation:2/8/2018  9:02 AM  Primary care physician:Elliott Power M.D.    Reason for Consultation:  Ms. Pang  is a pleasant 46 y.o. year old female who presented with follow-up seronegative Rheumatoid arthritis    Seronegative Rheumatoid Arthritis, non erosive  She if off prednisone and has swollen hands.  She did see [place] and the differential in their assessment included RS3PE vs calciphylaxis.  Also they are requesting for the slides from her dermatologist.    Hand swelling  Continues to have increasing hand pain and swelling.    Bruise and skin lesions  Her recurrent lesions are improving except for the lesion on her left hand and the wound on the left foot.    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  She continues aspirin.  She did see hematology with no additional work-up.    Antiphospholipids are negative.      Migraines  Continue depakote      RHEUM HISTORY:  Ms. Enedelia Pang presented in January 2016 with joint pain and swelling of hands,knees, and weakness and a 3 month history of septal  perforation.  ENT initial evaluation noted no disease activity.  She also has a history of abnormal LFT with unclear etiology.  She has had a work-up with GI in early 2017 which was unrevealing.  Also she has had a recurrent rash described as papules that ulcerate identified as MRSA infections.   Her labs did show thrombocytosis which could be inflammatory and anemia which was determined to be iron deficiency anemia.  Her rheumatologic work-up showed RF(-), MG (-), ANCA (-), SCL 70 (-) and anti centromere (-)  Parvovirus and rubella IgM was negative    Hand xrays did show periarticular osteopenia.    1/9/2017  Hepatitis B surface antigen negative  Hepatitis B core antibody negative  Hepatitis C antibody negative  quantiferon negative   HIV negative     CXR 12/6/2017 was negative for edema     11/2016 Nasal biopsy did not show active disease    Previous Medication:  Sulfasalazine 1000 mg BID (off)  Methotrexate 5 tabs weekly q Thursday (off)  Folic acid  Off prednisone    Epistaxis with nasal perforation  None since    Elevated liver enzymes  Off methotrexate due to elevated LFT.    Stable  She has had a liver biopsy 7/2017.  The liver biopsy suggests it is due to methotrexate toxicity however her elevated LFT had been ongoing prior to start of methotrexate    Chronic diarrhea  Had a capsule endoscopy - results are pending.  (not addressed today)    Pseudotumor cerebri  She is being followed by Dr. Berry and Erika Reece and had an injection on 8/16/2017    Renal cysts  Bilateral  Plan to see nephrology    History of retinal disease  She did see ophthalmology     Past Medical History:  Pseudotumor cerebri, MRSA infection history, CVA, miraines, pituatary tumor, migraine headaches, septal perforation. No history of blood clots    Past Medical History:   Diagnosis Date   • Allergy, unspecified not elsewhere classified    • Anemia 10/05/2017    Unsure if a current issue.   • Anxiety    • autoimmune    • Depression    •  "H/O Clostridium difficile infection    • Hx MRSA infection    • Hydrocephalus     with lumbar shunt   • Migraine with aura, with intractable migraine, so stated, without mention of status migrainosus    • MRSA (methicillin resistant Staphylococcus aureus)    • Pituitary abnormality (CMS-HCC)    • Staph infection    • Stroke (CMS-HCC)     2007     Past Surgical History:   Procedure Laterality Date   •  SHUNT INSERTION  5/31/2017    Procedure:  SHUNT INSERTION;  Surgeon: Drew Berry M.D.;  Location: SURGERY [facility];  Service:    • SPINAL CORD STIMULATOR  12/19/2016    Procedure: SPINAL CORD STIMULATOR FOR: PLACEMENT OF LEFT FLANK OCCIPITAL NERVE STIMULATOR BATTERY PACK;  Surgeon: Oli Oh III, M.D.;  Location: SURGERY [facility];  Service:    • LUMBAR EXPLORATION N/A 8/31/2015    Procedure: LUMBAR EXPLORATION- Lumbar shunt removal ;  Surgeon: Oli Oh III, M.D.;  Location: SURGERY [facility];  Service:    • IRRIGATION & DEBRIDEMENT NEURO N/A 6/28/2015    Procedure: IRRIGATION & DEBRIDEMENT NEURO;  Surgeon: Oli Oh III, M.D.;  Location: SURGERY [facility];  Service:    • APPENDECTOMY     • CHOLECYSTECTOMY     • OTHER      right knee surgery   • OTHER NEUROLOGICAL SURG      occipital nerve stimulator   • TONSILLECTOMY     • TUBAL LIGATION       Allergies   Allergen Reactions   • Prochlorperazine Swelling     Tongue swelling. Reaction as a teen. (compazine)  Tolerated Phenergan on 02/24/15   • Sumatriptan Unspecified     Historical=\"Heart stops.\" Reaction  between 1995 to 1997   • Vancomycin Shortness of Breath and Rash     Reaction in 2005.  D/W patient 8/31/15 - tolerated loading dose of vancomycin administered on 8/30/15 with some itching to chest with decreased infusion rate. Red Man Syndrome     Outpatient Encounter Prescriptions as of 2/8/2018   Medication Sig Dispense Refill   • predniSONE (DELTASONE) 5 MG Tab 15 mg po q day x 5 days then 10 mg po q day x 10 days " then 7.5 mg for 10 days then 5 mg po q day until seen 60 Tab 0   • divalproex ER (DEPAKOTE ER) 500 MG TABLET SR 24 HR Take 1 Tab by mouth every evening. 30 Tab 0   • levetiracetam (KEPPRA) 500 MG Tab Take 1 Tab by mouth every day. 30 Tab 0   • risperidone (RISPERDAL) 1 MG Tab Take 1 mg by mouth 2 times a day.     • aspirin (ASA) 81 MG Chew Tab chewable tablet Take 81 mg by mouth every day.     • ondansetron (ZOFRAN ODT) 4 MG TABLET DISPERSIBLE Take 1 Tab by mouth every 8 hours as needed for Nausea/Vomiting. 30 Tab 11   • omeprazole (PRILOSEC) 20 MG delayed-release capsule Take 1 Cap by mouth every day. 30 Cap 2   • gabapentin (NEURONTIN) 600 MG tablet Take 600 mg by mouth 3 times a day.     • oxycodone immediate release (ROXICODONE) 10 MG immediate release tablet Take 1 Tab by mouth every four hours as needed for Moderate Pain. 100 Tab 0   • duloxetine (CYMBALTA) 60 MG CPEP delayed-release capsule Take 60 mg by mouth 2 times a day.     • fluconazole (DIFLUCAN) 100 MG Tab Take 2 Tabs by mouth every day. 1 Tab 0   • clindamycin (CLEOCIN) 300 MG Cap Take 300 mg by mouth 4 times a day.       No facility-administered encounter medications on file as of 2/8/2018.        Social History     Social History   • Marital status:      Spouse name: N/A   • Number of children: N/A   • Years of education: N/A     Occupational History   •       on disability     Social History Main Topics   • Smoking status: Never Smoker   • Smokeless tobacco: Never Used   • Alcohol use Yes      Comment: Rare   • Drug use: No   • Sexual activity: Not on file     Other Topics Concern   • Not on file     Social History Narrative   • No narrative on file      History   Smoking Status   • Never Smoker   Smokeless Tobacco   • Never Used     History   Alcohol Use   • Yes     Comment: Rare     History   Drug Use No      OB History   No data available      No LMP recorded.    G P A L     Family History   Problem Relation Age of Onset      • No Known Problems Mother    • No Known Problems Father        Review of Systems   Constitutional: Negative for chills and fever.   Respiratory: Negative for cough and hemoptysis.    Gastrointestinal: Negative for abdominal pain.   Musculoskeletal: Positive for joint pain.        Hand swelling and joint pain   Skin: Positive for rash.                  Objective:   /60   Pulse (!) 102   Temp 36.6 °C (97.9 °F)   Resp 14   Wt 76.2 kg (168 lb)   SpO2 94%   BMI 29.77 kg/m²     Vitals:    02/08/18 0902   BP: 120/60   Pulse: (!) 102   Resp: 14   Temp: 36.6 °C (97.9 °F)   SpO2: 94%   Weight: 76.2 kg (168 lb)         Physical Exam   Constitutional: She is oriented to person, place, and time. She appears well-developed and well-nourished. No distress.   HENT:   Head: Normocephalic and atraumatic.   Right Ear: External ear normal.   Left Ear: External ear normal.   Eyes: Conjunctivae are normal. Pupils are equal, round, and reactive to light. Right eye exhibits no discharge. Left eye exhibits no discharge. No scleral icterus.   Pulmonary/Chest: Effort normal. No stridor. No respiratory distress.   Musculoskeletal: She exhibits no edema.   Today the left hand remains swollen with soft tissue swelling. Good ROM of her wrists bilateral flexion and extension.   No periarticular swelling of the MCP, PIP. Good ROM flexion and extension of elbows bilateral. .      Lymphadenopathy:     She has no cervical adenopathy.   Neurological: She is alert and oriented to person, place, and time.   Skin: Skin is warm and dry. She is not diaphoretic.   No alopecia.  On the dorsum of the hand just distal to the wrist ulceration about 1.5 cm in diameter with no drainage and mild surrounding erythema.      Psychiatric: She has a normal mood and affect. Her behavior is normal. Judgment and thought content normal.       Assessment:     1. Encounter for long-term (current) use of high-risk medication  REFERRAL TO OPHTHALMOLOGY    CBC  WITH DIFFERENTIAL    COMP METABOLIC PANEL    WESTERGREN SED RATE    CRP QUANTITIVE (NON-CARDIAC)   2. RS3PE syndrome  CBC WITH DIFFERENTIAL    COMP METABOLIC PANEL    WESTERGREN SED RATE    CRP QUANTITIVE (NON-CARDIAC)     Labs:    Lab Results   Component Value Date/Time    QNTTBGOLD Negative 01/09/2017 06:01 PM     Lab Results   Component Value Date/Time    HEPBCORIGM Negative 05/21/2017 02:07 AM    HEPBSAG Negative 05/21/2017 02:07 AM     Lab Results   Component Value Date/Time    HEPCAB Negative 05/21/2017 02:07 AM     Lab Results   Component Value Date/Time    SODIUM 137 11/22/2017 01:53 AM    POTASSIUM 3.2 (L) 11/22/2017 01:53 AM    CHLORIDE 105 11/22/2017 01:53 AM    CO2 23 11/22/2017 01:53 AM    GLUCOSE 126 (H) 11/22/2017 01:53 AM    BUN 9 11/22/2017 01:53 AM    CREATININE 0.62 11/22/2017 01:53 AM      Lab Results   Component Value Date/Time    WBC 5.7 11/22/2017 01:53 AM    RBC 3.77 (L) 11/22/2017 01:53 AM    HEMOGLOBIN 11.5 (L) 11/22/2017 01:53 AM    HEMATOCRIT 35.8 (L) 11/22/2017 01:53 AM    MCV 95.0 11/22/2017 01:53 AM    MCH 30.5 11/22/2017 01:53 AM    MCHC 32.1 (L) 11/22/2017 01:53 AM    MPV 9.7 11/22/2017 01:53 AM    NEUTSPOLYS 63.40 11/22/2017 01:53 AM    LYMPHOCYTES 27.90 11/22/2017 01:53 AM    MONOCYTES 7.00 11/22/2017 01:53 AM    EOSINOPHILS 0.70 11/22/2017 01:53 AM    BASOPHILS 0.70 11/22/2017 01:53 AM    HYPOCHROMIA 1+ 01/11/2017 02:29 PM    ANISOCYTOSIS 1+ 01/11/2017 02:29 PM      Lab Results   Component Value Date/Time    CALCIUM 8.3 (L) 11/22/2017 01:53 AM    ASTSGOT 55 (H) 11/22/2017 01:53 AM    ALTSGPT 100 (H) 11/22/2017 01:53 AM    ALKPHOSPHAT 122 (H) 11/22/2017 01:53 AM    TBILIRUBIN 0.3 11/22/2017 01:53 AM    ALBUMIN 3.6 11/22/2017 01:53 AM    ALBUMIN 4.69 11/06/2017 10:23 AM    TOTPROTEIN 5.9 (L) 11/22/2017 01:53 AM    TOTPROTEIN 7.50 11/06/2017 10:23 AM     Lab Results   Component Value Date/Time    URICACID 4.3 09/15/2017 03:45 PM    RHEUMFACTN <10 10/14/2017 07:40 PM    ANTINUCAB None  Detected 01/06/2017 04:12 AM     Lab Results   Component Value Date/Time    SEDRATEWES 19 11/18/2017 04:30 AM    CREACTPROT 0.27 11/18/2017 04:30 AM     Lab Results   Component Value Date/Time    RUSSELVIPER 40.3 09/15/2017 03:45 PM    DRVVTINTERP Not Present 09/15/2017 03:45 PM     Lab Results   Component Value Date/Time    B9VKSSTKGRF 178.0 01/06/2017 04:12 AM    F9UOAKPNMII 37.0 01/06/2017 04:12 AM    IGGCARDIOLI 5 09/15/2017 03:45 PM    IGMCARDIOLI 6 09/15/2017 03:45 PM    IGACARDIOLI 2 09/15/2017 03:45 PM    CRYOGLOBULIN NEG 72Hour 04/17/2017 03:53 PM     Lab Results   Component Value Date/Time    JICMSFF72 0 01/06/2017 04:12 AM    CENTROMBAB 2 01/06/2017 04:12 AM     Lab Results   Component Value Date/Time    ANCAIGG <1:20 01/06/2017 04:12 AM    R5TYFEMOBRB 178.0 01/06/2017 04:12 AM    ANTISSBSJ 0 11/18/2017 04:30 AM     Lab Results   Component Value Date/Time    COLORURINE DK Yellow 10/15/2017 09:40 AM    SPECGRAVITY 1.010 10/15/2017 09:40 AM    PHURINE 5.5 10/15/2017 09:40 AM    GLUCOSEUR Negative 10/15/2017 09:40 AM    KETONES Negative 10/15/2017 09:40 AM    PROTEINURIN Negative 10/15/2017 09:40 AM     No results found for: TOTPROTUR, TOTALVOLUME, [id number]  Lab Results   Component Value Date/Time    SSA60 14 11/18/2017 04:30 AM    SSA52 0 11/18/2017 04:30 AM     Lab Results   Component Value Date/Time    HBA1C 5.0 11/16/2017 11:12 PM     Lab Results   Component Value Date/Time    CPKTOTAL 32 11/17/2017 04:43 AM     Lab Results   Component Value Date/Time    G6PD 20.4 (H) 01/10/2017 01:54 PM     Lab Results   Component Value Date/Time    XHFC18CUAA Negative 10/14/2017 07:40 PM     No results found for: ACESERUM  Lab Results   Component Value Date/Time    25HYDROXY 45 01/09/2017 06:00 PM     No results found for: TSH, FREEDIR  Lab Results   Component Value Date/Time    TSHULTRASEN 1.980 11/16/2017 11:12 PM    FREET4 0.78 11/20/2017 03:00 AM     No results found for: MICROSOMALA, ANTITHYROGL  No results  found for: IGGLYMEABS  No results found for: ANTIMITOCHO, FACTIN  No results found for: IGA, TTRANSIGA, ENDOIGA  No results found for: FLTYPE, CRYSTALSBDF  No results found for: ISTATICAL  No results found for: ISTATCREAT  No results found for: CTELOPEP  No results found for: GBMABG  No results found for: PTHINTACT  1/27/2015 hepatitis E IgM    1/27/2015 ceruloplasmin = 29.7  1/27/2015 smith antibody = 9 (normal)  1/27/2015 MG negative  1/27/2015 alpha antitrypsin 119 ()  1/27/2015 ferritin = 75 (normal)  1/27/2015 GGT = 327 (0-55)    1/27/2015 IgG 4 = 11  10/31/2016 and 1/27/2015  p ANCA (-), c ANCA (-), PR3 (-)  10/31/2016 MPO (-)  12/7/2014 AST = 23 ALT = 137 Alk phos = 205  6/10/2016 ALT = 921 AST = 821 Alk phos = 537  Labs from JUne 2016 shows low albumin 2.7 and AST 92 and ALT of 370    6/11/2016; AST=92, ALT  = 370 Alk Phos = 331 Hgb = 8.6 MCV = 74.7 t bili = 1.2  10/15/2015: Hgb = 9.1 Plt =   ALT= 357 AST =127 Alk phos = 469 creatinine = 0.7    US LE venous doppler bilateral 6/30/2014  Probable baker's cyst on left lower extremity    Left hand XR 8/14/2014  Soft tissue swelling over the dorsum.  No erorsons no fractures     CT abdomen/Pelvis w/o Contrast 9/4/2014  Small amount of fluid in cul-de-sac no abnormalities in liver.  Abdominal aorta was normal.  Adrenal glands normal.  coritical medullatry calcifications noted of left kidney    CT head w w/o contrast 10/15/2016  No acute abnormalities and on CT maxillofacial w/ contrast - no evidence of sinusitis    CTchest w/con 1/12/2016  No findings to suggest aortic dissection or pulmonary embolus  Left renal 18 mm cyst in the ventral cortex medially    CT spine lumbar w/o contrast  8/30/2015  Small fluid  Collection posterior junction of the epidural catheter seen right lateral to the L2 posterior spinous process consistent with leakage and/or infection      Blood work performed at outside labs dated May 6, 2017 white blood cell count of 7 total protein  is 7.8 albumin is 4.1 monocytes 1010.7 neutrophils were normal at 5.33 and lymphocytes were normal at 9 point correction 0.94 platelets were 233 HEENT hemoglobin  Labs from June 22, 2017 showed a hemoglobin of 14.3 AST was normalized at 27 ALT was elevated at 71 and phosphatase was normal at 180 transferrin saturation was normal at 15% creatinine was normal at 0.6  Transglutaminase IgA antibody was negative on June 22, 2017  Creatinine IgA antibody was pending. On June 22, 2017 endomysial IgA antibody was negative on June 22, 2017  White blood cell was 8.6 platelets were 423 on June 22, 2017  IgG total was 813 which is normal on June 22, 2017 IgA total was 130 which is normal and June 22, 2017  Ceruloplasmin was normal at 35.2 on June 22, 2017 and alpha-1 antitrypsin in the serum was 142 which is normal on June 22, 2017    TTG antibody was negative on June 22, 2017  IgG subclass  IgG 2 subclass was normal at 311, IgG4 subclass was normal at 8, IgG1 subclass was normal at 676 and IgG 3 subclass was slightly elevated at 122 (15-1 02)  Ferritin was normal at 43 on June 22, 2017 creatinine IgG antibody was negative on June 22, 2017. On May 6, 2017 AST was 31 and ALT was normal at 54    Medical Decision Making:  Today's Assessment / Status / Plan:     Skin ulcerations  ASO and anti DNASE was elevated  Since her hospitalization her ulcerations still persist  She is being followed by wound care    RS3PE cs calciphylaxis  there is mild periarticular osteopenia on xray. CT of the right  hand noted soft tissue swelling and concern for cellulitis.  Our challenge has been her unexplained recurrent transaminitis.  We will restart plaquenil and prednisone.    Septal perforation  ANCA serologies are negative x 3  MG Is negative  Biopsy results did not show active disease  CXR did not note hilar adenopathy  Due to her recent hospitalization and findings by dermatology she was advised to avoid her biopsy and so that has been  cancelled.      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 Encounter for long-term (current) use of high-risk medication  REFERRAL TO OPHTHALMOLOGY    CBC WITH DIFFERENTIAL    COMP METABOLIC PANEL    WESTERGREN SED RATE    CRP QUANTITIVE (NON-CARDIAC)   2. RS3PE syndrome  CBC WITH DIFFERENTIAL    COMP METABOLIC PANEL    WESTERGREN SED RATE    CRP QUANTITIVE (NON-CARDIAC)     Return in about 4 weeks (around 3/8/2018).    I have spent greater than 50% of this 30 minute visit in counseling/coordination of care with the patient regarding therapy plan    She agreed and verbalized her agreement and understanding with the current plan. I answered all questions and concerns she has at this time and advised her to call at any time in there interim with questions or concerns in regards to her care.    Thank you for allowing me to participate in her care, I will continue to follow closely.

## 2018-02-18 ASSESSMENT — ENCOUNTER SYMPTOMS
ABDOMINAL PAIN: 0
FEVER: 0
COUGH: 0
HEMOPTYSIS: 0
CHILLS: 0

## 2018-02-19 NOTE — PROGRESS NOTES
Subjective:   Date of Consultation:2/8/2018  9:02 AM  Primary care physician:Elliott Power M.D.    Reason for Consultation:  Ms. Pang  is a pleasant 46 y.o. year old female who presented with follow-up seronegative Rheumatoid arthritis    Seronegative Rheumatoid Arthritis, non erosive  She if off prednisone and has swollen hands.  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  Also they are requesting for the slides from her dermatologist.    Hand swelling  Continues to have increasing hand pain and swelling.    Bruise and skin lesions  Her recurrent lesions are improving except for the lesion on her left hand and the wound on the left foot.    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  She continues aspirin.  She did see hematology with no additional work-up.    Antiphospholipids are negative.      Migraines  Continue depakote      RHEUM HISTORY:  Ms. Enedelia Pang presented in January 2016 with joint pain and swelling of hands,knees, and weakness and a 3 month history of septal perforation.  ENT initial evaluation noted no disease activity.  She also has a history of abnormal LFT with unclear etiology.  She has had a work-up with GI in early 2017 which was unrevealing.  Also she has had a recurrent rash described as papules that ulcerate identified as MRSA infections.   Her labs did show thrombocytosis which could be inflammatory and anemia which was determined to be iron deficiency anemia.  Her rheumatologic work-up showed RF(-), MG (-), ANCA (-), SCL 70 (-) and anti centromere (-)  Parvovirus and rubella IgM was negative    Hand xrays did show periarticular osteopenia.    1/9/2017  Hepatitis B surface antigen negative  Hepatitis B core antibody negative  Hepatitis C antibody negative  quantiferon negative   HIV negative     CXR 12/6/2017 was negative for edema     11/2016 Nasal biopsy did not  show active disease    Previous Medication:  Sulfasalazine 1000 mg BID (off)  Methotrexate 5 tabs weekly q Thursday (off)  Folic acid  Off prednisone    Epistaxis with nasal perforation  None since    Elevated liver enzymes  Off methotrexate due to elevated LFT.    Stable  She has had a liver biopsy 7/2017.  The liver biopsy suggests it is due to methotrexate toxicity however her elevated LFT had been ongoing prior to start of methotrexate    Chronic diarrhea  Had a capsule endoscopy - results are pending.  (not addressed today)    Pseudotumor cerebri  She is being followed by Dr. Berry and Erika Reece and had an injection on 8/16/2017    Renal cysts  Bilateral  Plan to see nephrology    History of retinal disease  She did see ophthalmology     Past Medical History:  Pseudotumor cerebri, MRSA infection history, CVA, miraines, pituatary tumor, migraine headaches, septal perforation. No history of blood clots    Past Medical History:   Diagnosis Date   • Allergy, unspecified not elsewhere classified    • Anemia 10/05/2017    Unsure if a current issue.   • Anxiety    • autoimmune    • Depression    • H/O Clostridium difficile infection    • Hx MRSA infection    • Hydrocephalus     with lumbar shunt   • Migraine with aura, with intractable migraine, so stated, without mention of status migrainosus    • MRSA (methicillin resistant Staphylococcus aureus)    • Pituitary abnormality (CMS-HCC)    • Staph infection    • Stroke (CMS-HCC)     2007     Past Surgical History:   Procedure Laterality Date   •  SHUNT INSERTION  5/31/2017    Procedure:  SHUNT INSERTION;  Surgeon: Drew Berry M.D.;  Location: [facility];  Service:    • SPINAL CORD STIMULATOR  12/19/2016    Procedure: SPINAL CORD STIMULATOR FOR: PLACEMENT OF LEFT FLANK OCCIPITAL NERVE STIMULATOR BATTERY PACK;  Surgeon: Oli Oh III, M.D.;  Location: SURGERY [facility];  Service:    • LUMBAR EXPLORATION N/A 8/31/2015    Procedure: LUMBAR  "EXPLORATION- Lumbar shunt removal ;  Surgeon: Oli Oh III, M.D.;  Location: SURGERY [facility];  Service:    • IRRIGATION & DEBRIDEMENT NEURO N/A 6/28/2015    Procedure: IRRIGATION & DEBRIDEMENT NEURO;  Surgeon: Oli Oh III, M.D.;  Location: SURGERY [facility];  Service:    • APPENDECTOMY     • CHOLECYSTECTOMY     • OTHER      right knee surgery   • OTHER NEUROLOGICAL SURG      occipital nerve stimulator   • TONSILLECTOMY     • TUBAL LIGATION       Allergies   Allergen Reactions   • Prochlorperazine Swelling     Tongue swelling. Reaction as a teen. (compazine)  Tolerated Phenergan on 02/24/15   • Sumatriptan Unspecified     Historical=\"Heart stops.\" Reaction  between 1995 to 1997   • Vancomycin Shortness of Breath and Rash     Reaction in 2005.  D/W patient 8/31/15 - tolerated loading dose of vancomycin administered on 8/30/15 with some itching to chest with decreased infusion rate. Red Man Syndrome     Outpatient Encounter Prescriptions as of 2/8/2018   Medication Sig Dispense Refill   • predniSONE (DELTASONE) 5 MG Tab 15 mg po q day x 5 days then 10 mg po q day x 10 days then 7.5 mg for 10 days then 5 mg po q day until seen 60 Tab 0   • divalproex ER (DEPAKOTE ER) 500 MG TABLET SR 24 HR Take 1 Tab by mouth every evening. 30 Tab 0   • levetiracetam (KEPPRA) 500 MG Tab Take 1 Tab by mouth every day. 30 Tab 0   • risperidone (RISPERDAL) 1 MG Tab Take 1 mg by mouth 2 times a day.     • aspirin (ASA) 81 MG Chew Tab chewable tablet Take 81 mg by mouth every day.     • ondansetron (ZOFRAN ODT) 4 MG TABLET DISPERSIBLE Take 1 Tab by mouth every 8 hours as needed for Nausea/Vomiting. 30 Tab 11   • omeprazole (PRILOSEC) 20 MG delayed-release capsule Take 1 Cap by mouth every day. 30 Cap 2   • gabapentin (NEURONTIN) 600 MG tablet Take 600 mg by mouth 3 times a day.     • oxycodone immediate release (ROXICODONE) 10 MG immediate release tablet Take 1 Tab by mouth every four hours as needed for Moderate " Pain. 100 Tab 0   • duloxetine (CYMBALTA) 60 MG CPEP delayed-release capsule Take 60 mg by mouth 2 times a day.     • fluconazole (DIFLUCAN) 100 MG Tab Take 2 Tabs by mouth every day. 1 Tab 0   • clindamycin (CLEOCIN) 300 MG Cap Take 300 mg by mouth 4 times a day.       No facility-administered encounter medications on file as of 2/8/2018.        Social History     Social History   • Marital status:      Spouse name: N/A   • Number of children: N/A   • Years of education: N/A     Occupational History   •       on disability     Social History Main Topics   • Smoking status: Never Smoker   • Smokeless tobacco: Never Used   • Alcohol use Yes      Comment: Rare   • Drug use: No   • Sexual activity: Not on file     Other Topics Concern   • Not on file     Social History Narrative   • No narrative on file      History   Smoking Status   • Never Smoker   Smokeless Tobacco   • Never Used     History   Alcohol Use   • Yes     Comment: Rare     History   Drug Use No      OB History   No data available      No LMP recorded.    G P A L     Family History   Problem Relation Age of Onset   • No Known Problems Mother    • No Known Problems Father        Review of Systems   Constitutional: Negative for chills and fever.   Respiratory: Negative for cough and hemoptysis.    Gastrointestinal: Negative for abdominal pain.   Musculoskeletal: Positive for joint pain.        Hand swelling and joint pain   Skin: Positive for rash.                  Objective:   /60   Pulse (!) 102   Temp 36.6 °C (97.9 °F)   Resp 14   Wt 76.2 kg (168 lb)   SpO2 94%   BMI 29.77 kg/m²    Vitals:    02/08/18 0902   BP: 120/60   Pulse: (!) 102   Resp: 14   Temp: 36.6 °C (97.9 °F)   SpO2: 94%   Weight: 76.2 kg (168 lb)         Physical Exam   Constitutional: She is oriented to person, place, and time. She appears well-developed and well-nourished. No distress.   HENT:   Head: Normocephalic and atraumatic.   Right Ear: External  ear normal.   Left Ear: External ear normal.   Eyes: Conjunctivae are normal. Pupils are equal, round, and reactive to light. Right eye exhibits no discharge. Left eye exhibits no discharge. No scleral icterus.   Pulmonary/Chest: Effort normal. No stridor. No respiratory distress.   Musculoskeletal: She exhibits no edema.   Today the left hand remains swollen with soft tissue swelling. Good ROM of her wrists bilateral flexion and extension.   No periarticular swelling of the MCP, PIP. Good ROM flexion and extension of elbows bilateral. .      Lymphadenopathy:     She has no cervical adenopathy.   Neurological: She is alert and oriented to person, place, and time.   Skin: Skin is warm and dry. She is not diaphoretic.   No alopecia.  On the dorsum of the hand just distal to the wrist ulceration about 1.5 cm in diameter with no drainage and mild surrounding erythema.      Psychiatric: She has a normal mood and affect. Her behavior is normal. Judgment and thought content normal.       Assessment:     1. Encounter for long-term (current) use of high-risk medication  REFERRAL TO OPHTHALMOLOGY    CBC WITH DIFFERENTIAL    COMP METABOLIC PANEL    WESTERGREN SED RATE    CRP QUANTITIVE (NON-CARDIAC)   2. RS3PE syndrome  CBC WITH DIFFERENTIAL    COMP METABOLIC PANEL    WESTERGREN SED RATE    CRP QUANTITIVE (NON-CARDIAC)     Labs:    Lab Results   Component Value Date/Time    QNTTBGOLD Negative 01/09/2017 06:01 PM     Lab Results   Component Value Date/Time    HEPBCORIGM Negative 05/21/2017 02:07 AM    HEPBSAG Negative 05/21/2017 02:07 AM     Lab Results   Component Value Date/Time    HEPCAB Negative 05/21/2017 02:07 AM     Lab Results   Component Value Date/Time    SODIUM 137 11/22/2017 01:53 AM    POTASSIUM 3.2 (L) 11/22/2017 01:53 AM    CHLORIDE 105 11/22/2017 01:53 AM    CO2 23 11/22/2017 01:53 AM    GLUCOSE 126 (H) 11/22/2017 01:53 AM    BUN 9 11/22/2017 01:53 AM    CREATININE 0.62 11/22/2017 01:53 AM      Lab Results    Component Value Date/Time    WBC 5.7 11/22/2017 01:53 AM    RBC 3.77 (L) 11/22/2017 01:53 AM    HEMOGLOBIN 11.5 (L) 11/22/2017 01:53 AM    HEMATOCRIT 35.8 (L) 11/22/2017 01:53 AM    MCV 95.0 11/22/2017 01:53 AM    MCH 30.5 11/22/2017 01:53 AM    MCHC 32.1 (L) 11/22/2017 01:53 AM    MPV 9.7 11/22/2017 01:53 AM    NEUTSPOLYS 63.40 11/22/2017 01:53 AM    LYMPHOCYTES 27.90 11/22/2017 01:53 AM    MONOCYTES 7.00 11/22/2017 01:53 AM    EOSINOPHILS 0.70 11/22/2017 01:53 AM    BASOPHILS 0.70 11/22/2017 01:53 AM    HYPOCHROMIA 1+ 01/11/2017 02:29 PM    ANISOCYTOSIS 1+ 01/11/2017 02:29 PM      Lab Results   Component Value Date/Time    CALCIUM 8.3 (L) 11/22/2017 01:53 AM    ASTSGOT 55 (H) 11/22/2017 01:53 AM    ALTSGPT 100 (H) 11/22/2017 01:53 AM    ALKPHOSPHAT 122 (H) 11/22/2017 01:53 AM    TBILIRUBIN 0.3 11/22/2017 01:53 AM    ALBUMIN 3.6 11/22/2017 01:53 AM    ALBUMIN 4.69 11/06/2017 10:23 AM    TOTPROTEIN 5.9 (L) 11/22/2017 01:53 AM    TOTPROTEIN 7.50 11/06/2017 10:23 AM     Lab Results   Component Value Date/Time    URICACID 4.3 09/15/2017 03:45 PM    RHEUMFACTN <10 10/14/2017 07:40 PM    ANTINUCAB None Detected 01/06/2017 04:12 AM     Lab Results   Component Value Date/Time    SEDRATEWES 19 11/18/2017 04:30 AM    CREACTPROT 0.27 11/18/2017 04:30 AM     Lab Results   Component Value Date/Time    RUSSELVIPER 40.3 09/15/2017 03:45 PM    DRVVTINTERP Not Present 09/15/2017 03:45 PM     Lab Results   Component Value Date/Time    J1TDPDJRUBO 178.0 01/06/2017 04:12 AM    G6NPIKFLSFL 37.0 01/06/2017 04:12 AM    IGGCARDIOLI 5 09/15/2017 03:45 PM    IGMCARDIOLI 6 09/15/2017 03:45 PM    IGACARDIOLI 2 09/15/2017 03:45 PM    CRYOGLOBULIN NEG 72Hour 04/17/2017 03:53 PM     Lab Results   Component Value Date/Time    EJVVUHY51 0 01/06/2017 04:12 AM    CENTROMBAB 2 01/06/2017 04:12 AM     Lab Results   Component Value Date/Time    ANCAIGG <1:20 01/06/2017 04:12 AM    H8GVEXLCAPO 178.0 01/06/2017 04:12 AM    ANTISSBSJ 0 11/18/2017 04:30  AM     Lab Results   Component Value Date/Time    COLORURINE DK Yellow 10/15/2017 09:40 AM    SPECGRAVITY 1.010 10/15/2017 09:40 AM    PHURINE 5.5 10/15/2017 09:40 AM    GLUCOSEUR Negative 10/15/2017 09:40 AM    KETONES Negative 10/15/2017 09:40 AM    PROTEINURIN Negative 10/15/2017 09:40 AM     No results found for: TOTPROTUR, TOTALVOLUME, LOCJXILW03  Lab Results   Component Value Date/Time    SSA60 14 11/18/2017 04:30 AM    SSA52 0 11/18/2017 04:30 AM     Lab Results   Component Value Date/Time    HBA1C 5.0 11/16/2017 11:12 PM     Lab Results   Component Value Date/Time    CPKTOTAL 32 11/17/2017 04:43 AM     Lab Results   Component Value Date/Time    G6PD 20.4 (H) 01/10/2017 01:54 PM     Lab Results   Component Value Date/Time    MCLC91TCXD Negative 10/14/2017 07:40 PM     No results found for: ACESERUM  Lab Results   Component Value Date/Time    25HYDROXY 45 01/09/2017 06:00 PM     No results found for: TSH, FREEDIR  Lab Results   Component Value Date/Time    TSHULTRASEN 1.980 11/16/2017 11:12 PM    FREET4 0.78 11/20/2017 03:00 AM     No results found for: MICROSOMALA, ANTITHYROGL  No results found for: IGGLYMEABS  No results found for: ANTIMITOCHO, FACTIN  No results found for: IGA, TTRANSIGA, ENDOIGA  No results found for: FLTYPE, CRYSTALSBDF  No results found for: ISTATICAL  No results found for: ISTATCREAT  No results found for: CTELOPEP  No results found for: GBMABG  No results found for: PTHINTACT  1/27/2015 hepatitis E IgM    1/27/2015 ceruloplasmin = 29.7  1/27/2015 smith antibody = 9 (normal)  1/27/2015 MG negative  1/27/2015 alpha antitrypsin 119 ()  1/27/2015 ferritin = 75 (normal)  1/27/2015 GGT = 327 (0-55)    1/27/2015 IgG 4 = 11  10/31/2016 and 1/27/2015  p ANCA (-), c ANCA (-), PR3 (-)  10/31/2016 MPO (-)  12/7/2014 AST = 23 ALT = 137 Alk phos = 205  6/10/2016 ALT = 921 AST = 821 Alk phos = 537  Labs from JUne 2016 shows low albumin 2.7 and AST 92 and ALT of 370    6/11/2016; AST=92, ALT  =  370 Alk Phos = 331 Hgb = 8.6 MCV = 74.7 t bili = 1.2  10/15/2015: Hgb = 9.1 Plt =   ALT= 357 AST =127 Alk phos = 469 creatinine = 0.7    US LE venous doppler bilateral 6/30/2014  Probable baker's cyst on left lower extremity    Left hand XR 8/14/2014  Soft tissue swelling over the dorsum.  No erorsons no fractures     CT abdomen/Pelvis w/o Contrast 9/4/2014  Small amount of fluid in cul-de-sac no abnormalities in liver.  Abdominal aorta was normal.  Adrenal glands normal.  coritical medullatry calcifications noted of left kidney    CT head w w/o contrast 10/15/2016  No acute abnormalities and on CT maxillofacial w/ contrast - no evidence of sinusitis    CTchest w/con 1/12/2016  No findings to suggest aortic dissection or pulmonary embolus  Left renal 18 mm cyst in the ventral cortex medially    CT spine lumbar w/o contrast  8/30/2015  Small fluid  Collection posterior junction of the epidural catheter seen right lateral to the L2 posterior spinous process consistent with leakage and/or infection      Blood work performed at outside labs dated May 6, 2017 white blood cell count of 7 total protein is 7.8 albumin is 4.1 monocytes 1010.7 neutrophils were normal at 5.33 and lymphocytes were normal at 9 point correction 0.94 platelets were 233 HEENT hemoglobin  Labs from June 22, 2017 showed a hemoglobin of 14.3 AST was normalized at 27 ALT was elevated at 71 and phosphatase was normal at 180 transferrin saturation was normal at 15% creatinine was normal at 0.6  Transglutaminase IgA antibody was negative on June 22, 2017  Creatinine IgA antibody was pending. On June 22, 2017 endomysial IgA antibody was negative on June 22, 2017  White blood cell was 8.6 platelets were 423 on June 22, 2017  IgG total was 813 which is normal on June 22, 2017 IgA total was 130 which is normal and June 22, 2017  Ceruloplasmin was normal at 35.2 on June 22, 2017 and alpha-1 antitrypsin in the serum was 142 which is normal on June 22,  2017    TTG antibody was negative on June 22, 2017  IgG subclass  IgG 2 subclass was normal at 311, IgG4 subclass was normal at 8, IgG1 subclass was normal at 676 and IgG 3 subclass was slightly elevated at 122 (15-1 02)  Ferritin was normal at 43 on June 22, 2017 creatinine IgG antibody was negative on June 22, 2017. On May 6, 2017 AST was 31 and ALT was normal at 54    Medical Decision Making:  Today's Assessment / Status / Plan:     Skin ulcerations  ASO and anti DNASE was elevated  Since her hospitalization her ulcerations still persist  She is being followed by wound care    RS3PE cs calciphylaxis  there is mild periarticular osteopenia on xray. CT of the right  hand noted soft tissue swelling and concern for cellulitis.  Our challenge has been her unexplained recurrent transaminitis.  We will restart plaquenil and prednisone.    Septal perforation  ANCA serologies are negative x 3  MG Is negative  Biopsy results did not show active disease  CXR did not note hilar adenopathy  Due to her recent hospitalization and findings by dermatology she was advised to avoid her biopsy and so that has been cancelled.      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 Encounter for long-term (current) use of high-risk medication  REFERRAL TO OPHTHALMOLOGY    CBC WITH DIFFERENTIAL    COMP METABOLIC PANEL    WESTERGREN SED RATE    CRP QUANTITIVE (NON-CARDIAC)   2. RS3PE syndrome  CBC WITH DIFFERENTIAL    COMP METABOLIC PANEL    WESTERGREN SED RATE    CRP QUANTITIVE (NON-CARDIAC)     Return in about 4 weeks (around 3/8/2018).    I have spent greater than 50% of this 30 minute visit in counseling/coordination of care with the patient regarding therapy plan    She agreed and verbalized her agreement and understanding with the current plan.  I answered all questions and concerns she has at this time and advised her to call at any time in there interim with questions or concerns in regards to her care.    Thank you for allowing me to participate in her care, I will continue to follow closely.

## 2018-02-22 ENCOUNTER — HOSPITAL ENCOUNTER (OUTPATIENT)
Dept: LAB | Facility: MEDICAL CENTER | Age: 48
End: 2018-02-22
Attending: PSYCHIATRY & NEUROLOGY
Payer: COMMERCIAL

## 2018-02-22 ENCOUNTER — OFFICE VISIT (OUTPATIENT)
Dept: NEUROLOGY | Facility: MEDICAL CENTER | Age: 48
End: 2018-02-22
Payer: COMMERCIAL

## 2018-02-22 ENCOUNTER — HOSPITAL ENCOUNTER (OUTPATIENT)
Dept: LAB | Facility: MEDICAL CENTER | Age: 48
End: 2018-02-22
Attending: FAMILY MEDICINE
Payer: COMMERCIAL

## 2018-02-22 VITALS
SYSTOLIC BLOOD PRESSURE: 118 MMHG | WEIGHT: 168.43 LBS | TEMPERATURE: 97.6 F | DIASTOLIC BLOOD PRESSURE: 60 MMHG | HEIGHT: 62 IN | BODY MASS INDEX: 31 KG/M2 | HEART RATE: 94 BPM | OXYGEN SATURATION: 98 % | RESPIRATION RATE: 16 BRPM

## 2018-02-22 DIAGNOSIS — Z98.2 S/P VP SHUNT: Chronic | ICD-10-CM

## 2018-02-22 DIAGNOSIS — R68.89 SPELLS OF DECREASED ATTENTIVENESS: ICD-10-CM

## 2018-02-22 DIAGNOSIS — E56.9 VITAMIN DEFICIENCY: ICD-10-CM

## 2018-02-22 LAB
25(OH)D3 SERPL-MCNC: 62 NG/ML (ref 30–100)
ALBUMIN SERPL BCP-MCNC: 4.4 G/DL (ref 3.2–4.9)
ALBUMIN/GLOB SERPL: 1.6 G/DL
ALP SERPL-CCNC: 46 U/L (ref 30–99)
ALT SERPL-CCNC: 15 U/L (ref 2–50)
ANION GAP SERPL CALC-SCNC: 11 MMOL/L (ref 0–11.9)
AST SERPL-CCNC: 14 U/L (ref 12–45)
BILIRUB SERPL-MCNC: 0.4 MG/DL (ref 0.1–1.5)
BUN SERPL-MCNC: 11 MG/DL (ref 8–22)
CALCIUM SERPL-MCNC: 9.2 MG/DL (ref 8.5–10.5)
CHLORIDE SERPL-SCNC: 106 MMOL/L (ref 96–112)
CO2 SERPL-SCNC: 23 MMOL/L (ref 20–33)
CREAT SERPL-MCNC: 0.69 MG/DL (ref 0.5–1.4)
CRP SERPL HS-MCNC: 0.07 MG/DL (ref 0–0.75)
ERYTHROCYTE [SEDIMENTATION RATE] IN BLOOD BY WESTERGREN METHOD: 6 MM/HOUR (ref 0–20)
GLOBULIN SER CALC-MCNC: 2.7 G/DL (ref 1.9–3.5)
GLUCOSE SERPL-MCNC: 96 MG/DL (ref 65–99)
POTASSIUM SERPL-SCNC: 3.6 MMOL/L (ref 3.6–5.5)
PROT SERPL-MCNC: 7.1 G/DL (ref 6–8.2)
SODIUM SERPL-SCNC: 140 MMOL/L (ref 135–145)
THYROPEROXIDASE AB SERPL-ACNC: 27.3 IU/ML (ref 0–9)
VIT B12 SERPL-MCNC: 457 PG/ML (ref 211–911)

## 2018-02-22 PROCEDURE — 86800 THYROGLOBULIN ANTIBODY: CPT

## 2018-02-22 PROCEDURE — 80053 COMPREHEN METABOLIC PANEL: CPT

## 2018-02-22 PROCEDURE — 82607 VITAMIN B-12: CPT

## 2018-02-22 PROCEDURE — 84207 ASSAY OF VITAMIN B-6: CPT

## 2018-02-22 PROCEDURE — 85652 RBC SED RATE AUTOMATED: CPT

## 2018-02-22 PROCEDURE — 99215 OFFICE O/P EST HI 40 MIN: CPT | Performed by: PSYCHIATRY & NEUROLOGY

## 2018-02-22 PROCEDURE — 82306 VITAMIN D 25 HYDROXY: CPT

## 2018-02-22 PROCEDURE — 86140 C-REACTIVE PROTEIN: CPT

## 2018-02-22 PROCEDURE — 86376 MICROSOMAL ANTIBODY EACH: CPT

## 2018-02-22 PROCEDURE — 36415 COLL VENOUS BLD VENIPUNCTURE: CPT

## 2018-02-22 NOTE — PATIENT INSTRUCTIONS
Seeing   Rheumatologist--- Rheumatologist referred the patient to [facility]  Pain specialist-- for botox, occipital nerve block, oxycontin  Wound specialist--- autoimmune?-- seeing     Moved to [place] 5 years    IMPRESSION:      1. Intractable Headache since age of 15 YO  post Botox ([facility] Specialist--- Botox, occipital nerve block, OxyContin)--  2. Intractable spells-- started with eyelid twitching since 2009  3. Status Post occipital nerve stimulator 2003 in the [place]--status post 6 revisions-- in [place]  4. Arthritis? Seeing specialist in [place]( was referred to [place] by Rheumatologist here)  5. Status post  shunt 2017 (under the impression of pseudotumor cerebri). Lumbar shunt in [place] for the pseudotumor cerebri--- complicated with infection    PLAN/RECOMMENDATIONS:      The patient went through many procedures related with headache-- including lumbar shunt,  shunt and occipital nerve stimulator( not MRI compatible)      The patient is here for spells--- one spell recorded in the hospital was psychogenic, however, the patient's routine EEG is not normal either  With shunt in the brain, we are not surprised that the patient's EEG is not normal either  The patient is       We will offer the patient 5 days of video EEG              SIGNATURE:  Drew Ribera      CC:  Elliott Power M.D.      INTERPRETATION:   This is a normal extended video electroencephalogram recording in the awake, drowsy and sleep state.  One typical shaking spell was captured. The spell captured was PSYCHOGENIC in nature. Clinical correlation is recommended.     Note: A normal electroencephalogram does not rule out epilepsy.      Updates provided to Dr. Siegel.        ROUTINE ELECTROENCEPHALOGRAM REPORT      INTERPRETATION:      ________________________________________________________________________    This scalp EEG denotes nonspecific cortical dysfunction - not localizing well-- ( more over right)    There  are no definite epileptiform but Interictal or Post-ictal remains possible    Of note, unremarkable EEG does not completely exclude the diagnosis  of seizures since seizure is an episodic phenomena.  Clinical correlation may help     If clinical suspicion of seizure remains high.  Prolonged outpatient EEG   monitoring may be of help.    ________________________________________________________________________

## 2018-02-22 NOTE — PROGRESS NOTES
NEUROLOGY NOTE    Referring Physician  Elliott Power M.D.      CHIEF COMPLAINT:  Spells of flashing light and shivering of eyelids  Chief Complaint   Patient presents with   • Follow-Up     EEG results headache       PRESENT ILLNESS:     The patient is a 46-year-old right-handed female   with longstanding history of migrainous headache who has failed several   preventive treatment in the past, presented again for evaluation of severe   right-sided headache.  Apparently earlier this month she was admitted for   intractable headache and was discharged to have followup with neurology   clinic; however, she has not seen a neurologist as of yet.  She was discharged   on 05/07/2017.  As mentioned, she had a long-standing history of migraine   headache.  At one point, she was on Topamax for headache prevention, which   failed to improve her headache.  She has tried Depakote, but apparently had   some drug-induced hepatitis with elevated liver enzymes and this was also   discontinued.  She has tried OxyContin for headache   relief.  At some point, she was seen by Dr. Dillan Handley who performs Botox   injection.  Patient also tells me she has increased intracranial pressure for   which she was placed on Diamox.      She had occipital nerve stimulator, which was placed   apparently in [place], [place].  She also tells me she had pituitary tumor or   mass, but due to her occipital stimulator, we are not able to obtain brain   MRI.      She says her physician told her to follow up on that in 10 years.    Apparently, they felt this is a benign mass.  As far as her headache   treatment, it appears she has responded to Benadryl and Toradol injection in   the past.  She also takes prednisone 10 mg daily that is likely due to her   rheumatological problem that she has.  She had a brain CT 2 days ago which   revealed no evidence of acute intracranial abnormalities.  Her lab work   reveals elevated liver enzymes with AST of  239 and ALT of 574 with increase   alkaline phosphatase of 174.  Her current headache is located in right frontal   orbital head region, throbbing at a time associated with photophobia and   nausea, but no phonophobia.  She has been having headache for almost 3 weeks.    She rates her headache 9/10.     1. Intractable Headache since age of 13YO  post botox ([facility]--- botox, occipital nerve block, oxycontin)--  2. Intractable spells-- started with eyelid twitching since 2009  3. Status Post occipital nerve stimulator 2003 in the [place]--status post 6 revisions-- in [place]  4. Arthritis? Seeing specialist in [place]( was referred to [place] by Rheumatologist here)    PAST MEDICAL HISTORY:  Past Medical History:   Diagnosis Date   • Allergy, unspecified not elsewhere classified    • Anemia 10/05/2017    Unsure if a current issue.   • Anxiety    • autoimmune    • Depression    • H/O Clostridium difficile infection    • Hx MRSA infection    • Hydrocephalus     with lumbar shunt   • Migraine with aura, with intractable migraine, so stated, without mention of status migrainosus    • MRSA (methicillin resistant Staphylococcus aureus)    • Pituitary abnormality (CMS-[facility])    • Staph infection    • Stroke (CMS-[facility])     2007       PAST SURGICAL HISTORY:  Past Surgical History:   Procedure Laterality Date   •  SHUNT INSERTION  5/31/2017    Procedure:  SHUNT INSERTION;  Surgeon: Drew Berry M.D.;  Location: SUR[facility]ERY [facility];  Service:    • SPINAL CORD STIMULATOR  12/19/2016    Procedure: SPINAL CORD STIMULATOR FOR: PLACEMENT OF LEFT FLANK OCCIPITAL NERVE STIMULATOR BATTERY PACK;  Surgeon: Oli Oh III, M.D.;  Location: SURGERY [facility];  Service:    • LUMBAR EXPLORATION N/A 8/31/2015    Procedure: LUMBAR EXPLORATION- Lumbar shunt removal ;  Surgeon: Oil Oh III, M.D.;  Location: SURGERY [facility];  Service:    • IRRIGATION & DEBRIDEMENT NEURO N/A 6/28/2015     "Procedure: IRRIGATION & DEBRIDEMENT NEURO;  Surgeon: Oli Oh III, M.D.;  Location: SURGERY [facility];  Service:    • APPENDECTOMY     • CHOLECYSTECTOMY     • OTHER      right knee surgery   • OTHER NEUROLOGICAL SURG      occipital nerve stimulator   • TONSILLECTOMY     • TUBAL LIGATION         FAMILY HISTORY:  Family History   Problem Relation Age of Onset   • No Known Problems Mother    • No Known Problems Father        SOCIAL HISTORY:  Social History     Social History   • Marital status:      Spouse name: N/A   • Number of children: N/A   • Years of education: N/A     Occupational History   •       on disability     Social History Main Topics   • Smoking status: Never Smoker   • Smokeless tobacco: Never Used   • Alcohol use Yes      Comment: Rare   • Drug use: No   • Sexual activity: Not on file     Other Topics Concern   • Not on file     Social History Narrative   • No narrative on file     ALLERGIES:  Allergies   Allergen Reactions   • Prochlorperazine Swelling     Tongue swelling. Reaction as a teen. (compazine)  Tolerated Phenergan on 02/24/15   • Sumatriptan Unspecified     Historical=\"Heart stops.\" Reaction  between 1995 to 1997   • Vancomycin Shortness of Breath and Rash     Reaction in 2005.  D/W patient 8/31/15 - tolerated loading dose of vancomycin administered on 8/30/15 with some itching to chest with decreased infusion rate. Red Man Syndrome     TOBHX  History   Smoking Status   • Never Smoker   Smokeless Tobacco   • Never Used     ALCHX  History   Alcohol Use   • Yes     Comment: Rare     DRUGHX  History   Drug Use No           MEDICATIONS:  Current Outpatient Prescriptions   Medication   • predniSONE (DELTASONE) 5 MG Tab   • divalproex ER (DEPAKOTE ER) 500 MG TABLET SR 24 HR   • risperidone (RISPERDAL) 1 MG Tab   • aspirin (ASA) 81 MG Chew Tab chewable tablet   • ondansetron (ZOFRAN ODT) 4 MG TABLET DISPERSIBLE   • omeprazole (PRILOSEC) 20 MG delayed-release " "capsule   • gabapentin (NEURONTIN) 600 MG tablet   • duloxetine (CYMBALTA) 60 MG CPEP delayed-release capsule   • fluconazole (DIFLUCAN) 100 MG Tab   • clindamycin (CLEOCIN) 300 MG Cap   • levetiracetam (KEPPRA) 500 MG Tab   • oxycodone immediate release (ROXICODONE) 10 MG immediate release tablet     No current facility-administered medications for this visit.        REVIEW OF SYSTEM:    Constitutional: Denies fevers, Denies weight changes   Eyes: Denies changes in vision, no eye pain   Ears/Nose/Throat/Mouth: Denies nasal congestion or sore throat   Cardiovascular: Denies chest pain or palpitations   Respiratory: Denies SOB.   Gastrointestinal/Hepatic: Denies abdominal pain, nausea, vomiting, diarrhea, constipation or GI bleeding   Genitourinary: Denies bladder dysfunction, dysuria or frequency   Musculoskeletal/Rheum: Denies joint pain and swelling   Skin/Breast: Denies rash, denies breast lumps or discharge   Neurological: intractable headache, occipital stimulator ( 2016 occipital stimulator battery ) off  Psychiatric: denies mood disorder   Endocrine: denies hx of diabetes or thyroid dysfunction   Heme/Oncology/Lymph Nodes: Denies enlarged lymph nodes, denies brusing or known bleeding disorder   Allergic/Immunologic: Denies hx of allergies         PHYSICAL AND NEUROLOGICAL EXMAINATIONS:  VITAL SIGNS: /60   Pulse 94   Temp 36.4 °C (97.6 °F)   Resp 16   Ht 1.575 m (5' 2\")   Wt 76.4 kg (168 lb 6.9 oz)   SpO2 98%   BMI 30.81 kg/m²   CURRENT WEIGHT:  BMI: Body mass index is 30.81 kg/m².  PREVIOUS WEIGHTS:  Wt Readings from Last 25 Encounters:   02/22/18 76.4 kg (168 lb 6.9 oz)   02/08/18 76.2 kg (168 lb)   12/14/17 76 kg (167 lb 9.6 oz)   12/12/17 76.7 kg (169 lb)   11/19/17 74.3 kg (163 lb 12.8 oz)   11/08/17 75.8 kg (167 lb)   11/07/17 75.8 kg (167 lb)   11/06/17 75.8 kg (167 lb 1.7 oz)   11/02/17 75.6 kg (166 lb 11.2 oz)   11/01/17 75.8 kg (167 lb)   10/15/17 72.9 kg (160 lb 11.5 oz)   10/11/17 74.6 " kg (164 lb 7.4 oz)   10/07/17 72.5 kg (159 lb 13.3 oz)   10/05/17 74.8 kg (165 lb)   09/15/17 76.2 kg (168 lb)   08/29/17 74.8 kg (165 lb)   08/16/17 74.8 kg (165 lb)   07/25/17 74.5 kg (164 lb 3.2 oz)   07/24/17 73.3 kg (161 lb 9.6 oz)   07/13/17 73.3 kg (161 lb 9.6 oz)   07/13/17 75.2 kg (165 lb 12.8 oz)   06/15/17 73.5 kg (162 lb)   06/01/17 75.1 kg (165 lb 9.1 oz)   05/06/17 71 kg (156 lb 8.4 oz)   04/29/17 72.7 kg (160 lb 4.4 oz)       General appearance of patient: WDWN(+) NAD(+)    EYES  o Fundus : Papilledem(-) Exudates(-) Hemorrhage(-)  Nervous System  Orientation to time, place and person(+)  Memory normal(-)  Language: aphasia(-)  Knowledge: past(+) Current(+)  Attention(+)  Cranial Nerves  • Nerve 2: intact  • Nerve 3,4,6: intact  • Nerve 5 : intact  • Nerve 7: intact  • Nerve 8: intact  • Nerve 9 & 10: intact  • Nerve 11: intact  • Nerve 12: intact  Muscle Power and muscle tone: symmetric, normal in upper and lower  Sensory System: Pin sensation intact(+)  Reflexes: symmetric throughout  Cerebellar Function FNP normal   Gait : Steady(-)  Heart and Vascular  Peripheral Vasucular system : Edema (-) Swelling(-)  RHB, Breathing sound clear  abdomen bowel sound normoactive  Extremities freely moveable  Joints no contracture  Wound over left foot     NEUROIMAGING: I reviewed the MRI/CT of brain       Seeing   Rheumatologist--- Rheumatologist referred the patient to [facility]  Pain specialist-- for botox, occipital nerve block, oxycontin  Wound specialist--- autoimmune?-- seeing     Moved to [place] 5 years    IMPRESSION:      1. Intractable Headache since age of 15 YO  post Botox ([facility]--- Botox, occipital nerve block, OxyContin)--  2. Intractable spells-- started with eyelid twitching since 2009  3. Status Post occipital nerve stimulator 2003 in the [place]--status post 6 revisions-- in [place]  4. Arthritis? Seeing specialist in [place]( was referred to [place] by Rheumatologist  here)  5. Status post  shunt 2017 (under the impression of pseudotumor cerebri). Lumbar shunt in [place] for the pseudotumor cerebri--- complicated with infection    PLAN/RECOMMENDATIONS:      The patient went through many procedures related with headache-- including lumbar shunt,  shunt and occipital nerve stimulator( not MRI compatible)      The patient is here for spells--- one spell recorded in the hospital was psychogenic, however, the patient's routine EEG is not normal either  With shunt in the brain, we are not surprised that the patient's EEG is not normal either  The patient is       We will offer the patient 5 days of video EEG              SIGNATURE:  Drew Ribera      CC:  Elliott Power M.D.      INTERPRETATION:   This is a normal extended video electroencephalogram recording in the awake, drowsy and sleep state.  One typical shaking spell was captured. The spell captured was PSYCHOGENIC in nature. Clinical correlation is recommended.     Note: A normal electroencephalogram does not rule out epilepsy.      Updates provided to Dr. Siegel.        ROUTINE ELECTROENCEPHALOGRAM REPORT      INTERPRETATION:      ________________________________________________________________________    This scalp EEG denotes nonspecific cortical dysfunction - not localizing well-- ( more over right)    There are no definite epileptiform but Interictal or Post-ictal remains possible    Of note, unremarkable EEG does not completely exclude the diagnosis  of seizures since seizure is an episodic phenomena.  Clinical correlation may help     If clinical suspicion of seizure remains high.  Prolonged outpatient EEG   monitoring may be of help.    ________________________________________________________________________

## 2018-02-23 LAB — THYROGLOB AB SERPL-ACNC: <0.9 IU/ML (ref 0–4)

## 2018-02-25 LAB — VIT B6 SERPL-MCNC: 325.1 NMOL/L (ref 20–125)

## 2018-02-27 ENCOUNTER — TELEPHONE (OUTPATIENT)
Dept: NEUROLOGY | Facility: MEDICAL CENTER | Age: 48
End: 2018-02-27

## 2018-02-27 NOTE — TELEPHONE ENCOUNTER
Results for BRIGID DAWSON (MRN [id number]) as of 2/27/2018 15:10   Ref. Range 11/18/2017 04:30 11/20/2017 03:00 2/22/2018 09:36   Vitamin B6 Latest Ref Range: 20.0 - 125.0 nmol/L   325.1 (H)   25-Hydroxy   Vitamin D 25 Latest Ref Range: 30 - 100 ng/mL   62   Free T-4 Latest Ref Range: 0.53 - 1.43 ng/dL  0.78    Stat C-Reactive Protein Latest Ref Range: 0.00 - 0.75 mg/dL 0.27  0.07   Prolactin Latest Ref Range: 2.80 - 26.00 ng/mL 63.18 (H)     Microsomal -Tpo- Abs Latest Ref Range: 0.0 - 9.0 IU/mL   27.3 (H)       ________________________________________________________________________    Please avoid supplementation like Vit B Complex    Vit B6 is better limited to less than 50mg per day  The maximum daily intake of vitamin B6 in adults is 100 milligrams.      [email or web address]/drugs-supplements/vitamin-[email or web address]/[email or web address]  ________________________________________________________________________

## 2018-03-02 ENCOUNTER — TELEPHONE (OUTPATIENT)
Dept: NEUROLOGY | Facility: MEDICAL CENTER | Age: 48
End: 2018-03-02

## 2018-03-02 NOTE — TELEPHONE ENCOUNTER
Spoke. w pt and confirmed EMU admission dates of 4/9-4/13. Address correct for letter to be mailed

## 2018-03-05 NOTE — TELEPHONE ENCOUNTER
Please avoid supplementation like Vit B Complex     Vit B6 is better limited to less than 50mg per day  The maximum daily intake of vitamin B6 in adults is 100 milligrams.        [email or web address]    Called gave results per Dr Ribera

## 2018-03-15 ENCOUNTER — OFFICE VISIT (OUTPATIENT)
Dept: RHEUMATOLOGY | Facility: PHYSICIAN GROUP | Age: 48
End: 2018-03-15
Payer: MEDICARE

## 2018-03-15 VITALS
HEART RATE: 100 BPM | TEMPERATURE: 98.5 F | SYSTOLIC BLOOD PRESSURE: 110 MMHG | BODY MASS INDEX: 31.83 KG/M2 | HEIGHT: 62 IN | RESPIRATION RATE: 16 BRPM | WEIGHT: 173 LBS | DIASTOLIC BLOOD PRESSURE: 70 MMHG | OXYGEN SATURATION: 98 %

## 2018-03-15 DIAGNOSIS — M06.00 SERONEGATIVE RHEUMATOID ARTHRITIS (HCC): ICD-10-CM

## 2018-03-15 DIAGNOSIS — L98.499 SKIN ULCER, UNSPECIFIED ULCER STAGE (HCC): ICD-10-CM

## 2018-03-15 DIAGNOSIS — Z79.899 ENCOUNTER FOR LONG-TERM (CURRENT) USE OF HIGH-RISK MEDICATION: ICD-10-CM

## 2018-03-15 DIAGNOSIS — Z79.52 CURRENT CHRONIC USE OF SYSTEMIC STEROIDS: ICD-10-CM

## 2018-03-15 DIAGNOSIS — M25.449 SWELLING OF JOINT OF HAND, UNSPECIFIED LATERALITY: ICD-10-CM

## 2018-03-15 PROCEDURE — 99214 OFFICE O/P EST MOD 30 MIN: CPT | Performed by: INTERNAL MEDICINE

## 2018-03-15 RX ORDER — PREDNISONE 5 MG/1
TABLET ORAL
Qty: 40 TAB | Refills: 1 | Status: ON HOLD | OUTPATIENT
Start: 2018-03-15 | End: 2018-04-09

## 2018-03-15 ASSESSMENT — ENCOUNTER SYMPTOMS
COUGH: 0
HEMOPTYSIS: 0
ABDOMINAL PAIN: 0
FEVER: 0
CHILLS: 0

## 2018-03-15 ASSESSMENT — PAIN SCALES - GENERAL: PAINLEVEL: 7=MODERATE-SEVERE PAIN

## 2018-03-15 NOTE — PROGRESS NOTES
Subjective:   Date of Consultation:3/15/2018 10:37 AM  Primary care physician:Elliott Power M.D.    Reason for Consultation:  Ms. Pang  is a pleasant 46 y.o. year old female who presented with follow-up seronegative Rheumatoid arthritis    Seronegative Rheumatoid Arthritis, non erosive  At last visit I restarted her on prednisone 5 mg po q day.  Her skin lesions are finally beginning to heal.  We are waiting for the [facility] review of her dermatology slides.  No fevers.  Has night sweats.    Left hand is still swelling in the MRI.  SHe reports left hand there are days she can't close her fingers.    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  Recommendations were also to consider starting plaquenil.      Hand swelling  Continues to have increasing hand pain and swelling.    Bruise and skin lesions  I started her on prednisone 5 mg.  She is seeing wound care.    She is gradually improving.  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  She continues aspirin.  She did see hematology with no additional work-up.    Antiphospholipids are negative.      Migraines  Continue depakote      RHEUM HISTORY:  Ms. Enedelia Pang presented in January 2016 with joint pain and swelling of hands,knees, and weakness and a 3 month history of septal perforation.  ENT initial evaluation noted no disease activity.  She also has a history of abnormal LFT with unclear etiology.  She has had a work-up with GI in early 2017 which was unrevealing.  Also she has had a recurrent rash described as papules that ulcerate identified as MRSA infections.   Her labs did show thrombocytosis which could be inflammatory and anemia which was determined to be iron deficiency anemia.  Her rheumatologic work-up showed RF(-),  MG (-), ANCA (-), SCL 70 (-) and anti centromere (-)  Parvovirus and rubella IgM was negative    Hand xrays did show periarticular osteopenia.    1/9/2017  Hepatitis B surface antigen negative  Hepatitis B core antibody negative  Hepatitis C antibody negative  quantiferon negative   HIV negative     CXR 12/6/2017 was negative for edema     11/2016 Nasal biopsy did not show active disease    Previous Medication:  Sulfasalazine 1000 mg BID (off)  Methotrexate 5 tabs weekly q Thursday (off)  Folic acid  Off prednisone    Epistaxis with nasal perforation  None since  stable    Elevated liver enzymes  Off methotrexate due to elevated LFT.    Stable  She has had a liver biopsy 7/2017.  The liver biopsy suggests it is due to methotrexate toxicity however her elevated LFT had been ongoing prior to start of methotrexate    Chronic diarrhea  Had a capsule endoscopy - results are pending.  (not addressed today)    Pseudotumor cerebri  She is being followed by Dr. eBrry and Erika Reece and had an injection on 8/16/2017    Renal cysts  Bilateral  Plan to see nephrology    History of retinal disease  She did see ophthalmology     Past Medical History:  Pseudotumor cerebri, MRSA infection history, CVA, miraines, pituatary tumor, migraine headaches, septal perforation. No history of blood clots    Past Medical History:   Diagnosis Date   • Allergy, unspecified not elsewhere classified    • Anemia 10/05/2017    Unsure if a current issue.   • Anxiety    • autoimmune    • Depression    • H/O Clostridium difficile infection    • Hx MRSA infection    • Hydrocephalus     with lumbar shunt   • Migraine with aura, with intractable migraine, so stated, without mention of status migrainosus    • MRSA (methicillin resistant Staphylococcus aureus)    • Pituitary abnormality (CMS-HCC)    • Staph infection    • Stroke (CMS-HCC)     2007     Past Surgical History:   Procedure Laterality Date   •  SHUNT INSERTION  5/31/2017    Procedure:   "SHUNT INSERTION;  Surgeon: Drew Berry M.D.;  Location: SURGERY [facility];  Service:    • SPINAL CORD STIMULATOR  12/19/2016    Procedure: SPINAL CORD STIMULATOR FOR: PLACEMENT OF LEFT FLANK OCCIPITAL NERVE STIMULATOR BATTERY PACK;  Surgeon: Oli Oh III, M.D.;  Location: SURGERY [facility];  Service:    • LUMBAR EXPLORATION N/A 8/31/2015    Procedure: LUMBAR EXPLORATION- Lumbar shunt removal ;  Surgeon: Oli Oh III, M.D.;  Location: SURGERY [facility];  Service:    • IRRIGATION & DEBRIDEMENT NEURO N/A 6/28/2015    Procedure: IRRIGATION & DEBRIDEMENT NEURO;  Surgeon: Oli Oh III, M.D.;  Location: SURGERY [facility];  Service:    • APPENDECTOMY     • CHOLECYSTECTOMY     • OTHER      right knee surgery   • OTHER NEUROLOGICAL SURG      occipital nerve stimulator   • TONSILLECTOMY     • TUBAL LIGATION       Allergies   Allergen Reactions   • Prochlorperazine Swelling     Tongue swelling. Reaction as a teen. (compazine)  Tolerated Phenergan on 02/24/15   • Sumatriptan Unspecified     Historical=\"Heart stops.\" Reaction  between 1995 to 1997   • Vancomycin Shortness of Breath and Rash     Reaction in 2005.  D/W patient 8/31/15 - tolerated loading dose of vancomycin administered on 8/30/15 with some itching to chest with decreased infusion rate. Red Man Syndrome     Outpatient Encounter Prescriptions as of 3/15/2018   Medication Sig Dispense Refill   • predniSONE (DELTASONE) 5 MG Tab Take 5 mg po q day may increase on flare days by 5 mg po 40 Tab 1   • divalproex ER (DEPAKOTE ER) 500 MG TABLET SR 24 HR Take 1 Tab by mouth every evening. (Patient taking differently: Take 500 mg by mouth 3 times a day.) 30 Tab 0   • risperidone (RISPERDAL) 1 MG Tab Take 1 mg by mouth 2 times a day.     • aspirin (ASA) 81 MG Chew Tab chewable tablet Take 81 mg by mouth every day.     • ondansetron (ZOFRAN ODT) 4 MG TABLET DISPERSIBLE Take 1 Tab by mouth every 8 hours as needed for Nausea/Vomiting. " 30 Tab 11   • omeprazole (PRILOSEC) 20 MG delayed-release capsule Take 1 Cap by mouth every day. 30 Cap 2   • gabapentin (NEURONTIN) 600 MG tablet Take 600 mg by mouth 3 times a day.     • oxycodone immediate release (ROXICODONE) 10 MG immediate release tablet Take 1 Tab by mouth every four hours as needed for Moderate Pain. 100 Tab 0   • duloxetine (CYMBALTA) 60 MG CPEP delayed-release capsule Take 60 mg by mouth 2 times a day.     • [DISCONTINUED] predniSONE (DELTASONE) 5 MG Tab 15 mg po q day x 5 days then 10 mg po q day x 10 days then 7.5 mg for 10 days then 5 mg po q day until seen (Patient taking differently: 10 mg. 15 mg po q day x 5 days then 10 mg po q day x 10 days then 7.5 mg for 10 days then 5 mg po q day until seen) 60 Tab 0   • fluconazole (DIFLUCAN) 100 MG Tab Take 2 Tabs by mouth every day. (Patient not taking: Reported on 2/22/2018) 1 Tab 0   • clindamycin (CLEOCIN) 300 MG Cap Take 300 mg by mouth 4 times a day.       No facility-administered encounter medications on file as of 3/15/2018.        Social History     Social History   • Marital status:      Spouse name: N/A   • Number of children: N/A   • Years of education: N/A     Occupational History   •       on disability     Social History Main Topics   • Smoking status: Never Smoker   • Smokeless tobacco: Never Used   • Alcohol use Yes      Comment: Rare   • Drug use: No   • Sexual activity: Not on file     Other Topics Concern   • Not on file     Social History Narrative   • No narrative on file      History   Smoking Status   • Never Smoker   Smokeless Tobacco   • Never Used     History   Alcohol Use   • Yes     Comment: Rare     History   Drug Use No      OB History   No data available      No LMP recorded.    G P A L     Family History   Problem Relation Age of Onset   • No Known Problems Mother    • No Known Problems Father        Review of Systems   Constitutional: Negative for chills and fever.   Respiratory: Negative  "for cough and hemoptysis.    Gastrointestinal: Negative for abdominal pain.   Musculoskeletal: Positive for joint pain.        Hand swelling and joint pain   Skin: Positive for rash.                  Objective:   /70   Pulse 100   Temp 36.9 °C (98.5 °F)   Resp 16   Ht 1.575 m (5' 2\")   Wt 78.5 kg (173 lb)   SpO2 98%   BMI 31.64 kg/m²    Vitals:    03/15/18 1459   BP: 110/70   Pulse: 100   Resp: 16   Temp: 36.9 °C (98.5 °F)   SpO2: 98%   Weight: 78.5 kg (173 lb)   Height: 1.575 m (5' 2\")         Physical Exam   Constitutional: She is oriented to person, place, and time. She appears well-developed and well-nourished. No distress.   HENT:   Head: Normocephalic and atraumatic.   Right Ear: External ear normal.   Left Ear: External ear normal.   Eyes: Conjunctivae are normal. Pupils are equal, round, and reactive to light. Right eye exhibits no discharge. Left eye exhibits no discharge. No scleral icterus.   Cardiovascular: Normal rate, regular rhythm and normal heart sounds.    Pulmonary/Chest: Effort normal. No stridor. No respiratory distress.   Musculoskeletal: She exhibits no edema.   Today the left hand remains swollen with soft tissue swelling. Good ROM of her wrists bilateral flexion and extension.   No periarticular swelling of the MCP, PIP of the right hand. Good ROM flexion and extension of elbows bilateral. Left foot in a boot   Lymphadenopathy:     She has no cervical adenopathy.   Neurological: She is alert and oriented to person, place, and time.   Skin: Skin is warm and dry. She is not diaphoretic.   No alopecia.  On the dorsum of the hand just distal to the wrist wound with dressing over it   Psychiatric: She has a normal mood and affect. Her behavior is normal. Judgment and thought content normal.       Assessment:     1. Encounter for long-term (current) use of high-risk medication  CBC WITH DIFFERENTIAL    COMP METABOLIC PANEL    CRP QUANTITIVE (NON-CARDIAC)    WESTERGREN SED RATE    " DS-BONE DENSITY STUDY (DEXA)   2. Current chronic use of systemic steroids  CBC WITH DIFFERENTIAL    COMP METABOLIC PANEL    CRP QUANTITIVE (NON-CARDIAC)    WESTERGREN SED RATE    DS-BONE DENSITY STUDY (DEXA)   3. Swelling of joint of hand, unspecified laterality     4. Seronegative rheumatoid arthritis (CMS-HCC)     5. Skin ulcer, unspecified ulcer stage (CMS-HCC)       Labs:    Lab Results   Component Value Date/Time    QNTTBGOLD Negative 01/09/2017 06:01 PM     Lab Results   Component Value Date/Time    HEPBCORIGM Negative 05/21/2017 02:07 AM    HEPBSAG Negative 05/21/2017 02:07 AM     Lab Results   Component Value Date/Time    HEPCAB Negative 05/21/2017 02:07 AM     Lab Results   Component Value Date/Time    SODIUM 140 02/22/2018 09:37 AM    POTASSIUM 3.6 02/22/2018 09:37 AM    CHLORIDE 106 02/22/2018 09:37 AM    CO2 23 02/22/2018 09:37 AM    GLUCOSE 96 02/22/2018 09:37 AM    BUN 11 02/22/2018 09:37 AM    CREATININE 0.69 02/22/2018 09:37 AM      Lab Results   Component Value Date/Time    WBC 5.7 11/22/2017 01:53 AM    RBC 3.77 (L) 11/22/2017 01:53 AM    HEMOGLOBIN 11.5 (L) 11/22/2017 01:53 AM    HEMATOCRIT 35.8 (L) 11/22/2017 01:53 AM    MCV 95.0 11/22/2017 01:53 AM    MCH 30.5 11/22/2017 01:53 AM    MCHC 32.1 (L) 11/22/2017 01:53 AM    MPV 9.7 11/22/2017 01:53 AM    NEUTSPOLYS 63.40 11/22/2017 01:53 AM    LYMPHOCYTES 27.90 11/22/2017 01:53 AM    MONOCYTES 7.00 11/22/2017 01:53 AM    EOSINOPHILS 0.70 11/22/2017 01:53 AM    BASOPHILS 0.70 11/22/2017 01:53 AM    HYPOCHROMIA 1+ 01/11/2017 02:29 PM    ANISOCYTOSIS 1+ 01/11/2017 02:29 PM      Lab Results   Component Value Date/Time    CALCIUM 9.2 02/22/2018 09:37 AM    ASTSGOT 14 02/22/2018 09:37 AM    ALTSGPT 15 02/22/2018 09:37 AM    ALKPHOSPHAT 46 02/22/2018 09:37 AM    TBILIRUBIN 0.4 02/22/2018 09:37 AM    ALBUMIN 4.4 02/22/2018 09:37 AM    ALBUMIN 4.69 11/06/2017 10:23 AM    TOTPROTEIN 7.1 02/22/2018 09:37 AM    TOTPROTEIN 7.50 11/06/2017 10:23 AM     Lab  Results   Component Value Date/Time    URICACID 4.3 09/15/2017 03:45 PM    RHEUMFACTN <10 10/14/2017 07:40 PM    ANTINUCAB None Detected 01/06/2017 04:12 AM     Lab Results   Component Value Date/Time    SEDRATEWES 6 02/22/2018 09:36 AM    CREACTPROT 0.07 02/22/2018 09:36 AM     Lab Results   Component Value Date/Time    RUSSELVIPER 40.3 09/15/2017 03:45 PM    DRVVTINTERP Not Present 09/15/2017 03:45 PM     Lab Results   Component Value Date/Time    P8XUNPESXUB 178.0 01/06/2017 04:12 AM    G8ATVCZUPRF 37.0 01/06/2017 04:12 AM    IGGCARDIOLI 5 09/15/2017 03:45 PM    IGMCARDIOLI 6 09/15/2017 03:45 PM    IGACARDIOLI 2 09/15/2017 03:45 PM    CRYOGLOBULIN NEG 72Hour 04/17/2017 03:53 PM     Lab Results   Component Value Date/Time    QTUNYLV67 0 01/06/2017 04:12 AM    CENTROMBAB 2 01/06/2017 04:12 AM     Lab Results   Component Value Date/Time    ANCAIGG <1:20 01/06/2017 04:12 AM    E1WBQABCTSE 178.0 01/06/2017 04:12 AM    ANTISSBSJ 0 11/18/2017 04:30 AM     Lab Results   Component Value Date/Time    COLORURINE DK Yellow 10/15/2017 09:40 AM    SPECGRAVITY 1.010 10/15/2017 09:40 AM    PHURINE 5.5 10/15/2017 09:40 AM    GLUCOSEUR Negative 10/15/2017 09:40 AM    KETONES Negative 10/15/2017 09:40 AM    PROTEINURIN Negative 10/15/2017 09:40 AM     No results found for: TOTPROTUR, TOTALVOLUME, [id number]  Lab Results   Component Value Date/Time    SSA60 14 11/18/2017 04:30 AM    SSA52 0 11/18/2017 04:30 AM     Lab Results   Component Value Date/Time    HBA1C 5.0 11/16/2017 11:12 PM     Lab Results   Component Value Date/Time    CPKTOTAL 32 11/17/2017 04:43 AM     Lab Results   Component Value Date/Time    G6PD 20.4 (H) 01/10/2017 01:54 PM     Lab Results   Component Value Date/Time    CEFA17QYRT Negative 10/14/2017 07:40 PM     No results found for: ACESERUM  Lab Results   Component Value Date/Time    25HYDROXY 62 02/22/2018 09:36 AM     No results found for: TSH, FREEDIR  Lab Results   Component Value Date/Time    TSHULTRASEN  1.980 11/16/2017 11:12 PM    FREET4 0.78 11/20/2017 03:00 AM     Lab Results   Component Value Date/Time    MICROSOMALA 27.3 (H) 02/22/2018 09:36 AM    ANTITHYROGL <0.9 02/22/2018 09:36 AM     No results found for: IGGLYMEABS  No results found for: ANTIMITOCHO, FACTIN  No results found for: IGA, TTRANSIGA, ENDOIGA  No results found for: FLTYPE, CRYSTALSBDF  No results found for: ISTATICAL  No results found for: ISTATCREAT  No results found for: CTELOPEP  No results found for: GBMABG  No results found for: PTHINTACT  1/27/2015 hepatitis E IgM    1/27/2015 ceruloplasmin = 29.7  1/27/2015 smith antibody = 9 (normal)  1/27/2015 MG negative  1/27/2015 alpha antitrypsin 119 ()  1/27/2015 ferritin = 75 (normal)  1/27/2015 GGT = 327 (0-55)    1/27/2015 IgG 4 = 11  10/31/2016 and 1/27/2015  p ANCA (-), c ANCA (-), PR3 (-)  10/31/2016 MPO (-)  12/7/2014 AST = 23 ALT = 137 Alk phos = 205  6/10/2016 ALT = 921 AST = 821 Alk phos = 537  Labs from JUne 2016 shows low albumin 2.7 and AST 92 and ALT of 370    6/11/2016; AST=92, ALT  = 370 Alk Phos = 331 Hgb = 8.6 MCV = 74.7 t bili = 1.2  10/15/2015: Hgb = 9.1 Plt =   ALT= 357 AST =127 Alk phos = 469 creatinine = 0.7    US LE venous doppler bilateral 6/30/2014  Probable baker's cyst on left lower extremity    Left hand XR 8/14/2014  Soft tissue swelling over the dorsum.  No erorsons no fractures     CT abdomen/Pelvis w/o Contrast 9/4/2014  Small amount of fluid in cul-de-sac no abnormalities in liver.  Abdominal aorta was normal.  Adrenal glands normal.  coritical medullatry calcifications noted of left kidney    CT head w w/o contrast 10/15/2016  No acute abnormalities and on CT maxillofacial w/ contrast - no evidence of sinusitis    CTchest w/con 1/12/2016  No findings to suggest aortic dissection or pulmonary embolus  Left renal 18 mm cyst in the ventral cortex medially    CT spine lumbar w/o contrast  8/30/2015  Small fluid  Collection posterior junction of the epidural  catheter seen right lateral to the L2 posterior spinous process consistent with leakage and/or infection      Blood work performed at outside labs dated May 6, 2017 white blood cell count of 7 total protein is 7.8 albumin is 4.1 monocytes 1010.7 neutrophils were normal at 5.33 and lymphocytes were normal at 9 point correction 0.94 platelets were 233 HEENT hemoglobin  Labs from June 22, 2017 showed a hemoglobin of 14.3 AST was normalized at 27 ALT was elevated at 71 and phosphatase was normal at 180 transferrin saturation was normal at 15% creatinine was normal at 0.6  Transglutaminase IgA antibody was negative on June 22, 2017  Creatinine IgA antibody was pending. On June 22, 2017 endomysial IgA antibody was negative on June 22, 2017  White blood cell was 8.6 platelets were 423 on June 22, 2017  IgG total was 813 which is normal on June 22, 2017 IgA total was 130 which is normal and June 22, 2017  Ceruloplasmin was normal at 35.2 on June 22, 2017 and alpha-1 antitrypsin in the serum was 142 which is normal on June 22, 2017    TTG antibody was negative on June 22, 2017  IgG subclass  IgG 2 subclass was normal at 311, IgG4 subclass was normal at 8, IgG1 subclass was normal at 676 and IgG 3 subclass was slightly elevated at 122 (15-1 02)  Ferritin was normal at 43 on June 22, 2017 creatinine IgG antibody was negative on June 22, 2017. On May 6, 2017 AST was 31 and ALT was normal at 54    Medical Decision Making:  Today's Assessment / Status / Plan:     Skin ulcerations  Wound care notes suggests pyoderma.  I would like to see Douglas's review of the skin biopsy.  I would be cautious proceeding with anti TNF therapy since she has a history of recurrent hardware infections.  We can continue predniseon 5 mg po q day.      Since her hospitalization her ulcerations still persist  She is being followed by wound care    RS3PE cs calciphylaxis  there is mild periarticular osteopenia on xray. CT of the right  hand noted soft  tissue swelling and concern for cellulitis.  Our challenge has been her unexplained recurrent transaminitis.  Pending labs CBC and CMP we will restart plaquenil at 300 mg po q day.      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 Encounter for long-term (current) use of high-risk medication  CBC WITH DIFFERENTIAL    COMP METABOLIC PANEL    CRP QUANTITIVE (NON-CARDIAC)    WESTERGREN SED RATE    DS-BONE DENSITY STUDY (DEXA)   2. Current chronic use of systemic steroids  CBC WITH DIFFERENTIAL    COMP METABOLIC PANEL    CRP QUANTITIVE (NON-CARDIAC)    WESTERGREN SED RATE    DS-BONE DENSITY STUDY (DEXA)   3. Swelling of joint of hand, unspecified laterality     4. Seronegative rheumatoid arthritis (CMS-HCC)     5. Skin ulcer, unspecified ulcer stage (CMS-HCC)       Return in about 3 months (around 6/15/2018).    I have spent greater than 50% of this 30 minute visit in counseling/coordination of care with the patient regarding therapy plan    She agreed and verbalized her agreement and understanding with the current plan. I answered all questions and concerns she has at this time and advised her to call at any time in there interim with questions or concerns in regards to her care.    Thank you for allowing me to participate in her care, I will continue to follow closely.

## 2018-03-15 NOTE — LETTER
[facility]   [address], [address]  [place] [address]  Phone: [phone]  Fax: [phone]              Encounter Date: 3/15/2018    Dear Dr. Power,        It was a pleasure seeing your patient, Enedelia Pang, on 3/15/2018. Diagnoses of Encounter for long-term (current) use of high-risk medication, Current chronic use of systemic steroids, Swelling of joint of hand, unspecified laterality, Seronegative rheumatoid arthritis (CMS-HCC), and Skin ulcer, unspecified ulcer stage (CMS-[facility]) were pertinent to this visit.     Please find attached progress note which includes the history I obtained from Ms. Pang, my physical examination findings, my impression and recommendations.      Once again, it was a pleasure participating in your patient's care.  Please feel free to contact me if you have any questions or if I can be of any further assistance to your patients.      Sincerely,    Aimee Andrade M.D.  Electronically Signed          PROGRESS NOTE:  Subjective:   Date of Consultation:3/15/2018 10:37 AM  Primary care physician:Elliott Power M.D.    Reason for Consultation:  Ms. Pang  is a pleasant 46 y.o. year old female who presented with follow-up seronegative Rheumatoid arthritis    Seronegative Rheumatoid Arthritis, non erosive  At last visit I restarted her on prednisone 5 mg po q day.  Her skin lesions are finally beginning to heal.  We are waiting for the [place] review of her dermatology slides.  No fevers.  Has night sweats.    Left hand is still swelling in the MRI.  SHe reports left hand there are days she can't close her fingers.    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  Recommendations were also to consider starting plaquenil.      Hand swelling  Continues to have increasing hand pain and swelling.    Bruise and skin lesions  I started her on prednisone 5 mg.  She is seeing wound care.    She is gradually improving.  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  She continues aspirin.  She did see hematology with no additional work-up.    Antiphospholipids are negative.      Migraines  Continue depakote      RHEUM HISTORY:  Ms. Enedelia Pang presented in January 2016 with joint pain and swelling of hands,knees, and weakness and a 3 month history of septal perforation.  ENT initial evaluation noted no disease activity.  She also has a history of abnormal LFT with unclear etiology.  She has had a work-up with GI in early 2017 which was unrevealing.  Also she has had a recurrent rash described as papules that ulcerate identified as MRSA infections.   Her labs did show thrombocytosis which could be inflammatory and anemia which was determined to be iron deficiency anemia.  Her rheumatologic work-up showed RF(-), MG (-), ANCA (-), SCL 70 (-) and anti centromere (-)  Parvovirus and rubella IgM was negative    Hand xrays did show periarticular osteopenia.    1/9/2017  Hepatitis B surface antigen negative  Hepatitis B core antibody negative  Hepatitis C antibody negative  quantiferon negative   HIV negative     CXR 12/6/2017 was negative for edema     11/2016 Nasal biopsy did not show active disease    Previous Medication:  Sulfasalazine 1000 mg BID (off)  Methotrexate 5 tabs weekly q Thursday (off)  Folic acid  Off prednisone    Epistaxis with nasal perforation  None since  stable    Elevated liver enzymes  Off methotrexate due to elevated LFT.    Stable  She has had a liver biopsy 7/2017.  The liver biopsy suggests it is due to methotrexate toxicity however her elevated LFT had been ongoing prior to start of methotrexate    Chronic diarrhea  Had a capsule endoscopy - results are pending.  (not addressed today)    Pseudotumor cerebri  She is being  followed by Dr. Berry and Erika Reece and had an injection on 8/16/2017    Renal cysts  Bilateral  Plan to see nephrology    History of retinal disease  She did see ophthalmology     Past Medical History:  Pseudotumor cerebri, MRSA infection history, CVA, miraines, pituatary tumor, migraine headaches, septal perforation. No history of blood clots    Past Medical History:   Diagnosis Date   • Allergy, unspecified not elsewhere classified    • Anemia 10/05/2017    Unsure if a current issue.   • Anxiety    • autoimmune    • Depression    • H/O Clostridium difficile infection    • Hx MRSA infection    • Hydrocephalus     with lumbar shunt   • Migraine with aura, with intractable migraine, so stated, without mention of status migrainosus    • MRSA (methicillin resistant Staphylococcus aureus)    • Pituitary abnormality (CMS-HCC)    • Staph infection    • Stroke (CMS-[facility])     2007     Past Surgical History:   Procedure Laterality Date   •  SHUNT INSERTION  5/31/2017    Procedure:  SHUNT INSERTION;  Surgeon: Drew Berry M.D.;  Location: SURGERY [facility];  Service:    • SPINAL CORD STIMULATOR  12/19/2016    Procedure: SPINAL CORD STIMULATOR FOR: PLACEMENT OF LEFT FLANK OCCIPITAL NERVE STIMULATOR BATTERY PACK;  Surgeon: Oli Oh III, M.D.;  Location: SURGERY [facility];  Service:    • LUMBAR EXPLORATION N/A 8/31/2015    Procedure: LUMBAR EXPLORATION- Lumbar shunt removal ;  Surgeon: Oli Oh III, M.D.;  Location: SURGERY [facility];  Service:    • IRRIGATION & DEBRIDEMENT NEURO N/A 6/28/2015    Procedure: IRRIGATION & DEBRIDEMENT NEURO;  Surgeon: Oli Oh III, M.D.;  Location: SURGERY [facility];  Service:    • APPENDECTOMY     • CHOLECYSTECTOMY     • OTHER      right knee surgery   • OTHER NEUROLOGICAL SURG      occipital nerve stimulator   • TONSILLECTOMY     • TUBAL LIGATION       Allergies   Allergen Reactions   • Prochlorperazine Swelling     Tongue swelling. Reaction as  "a teen. (compazine)  Tolerated Phenergan on 02/24/15   • Sumatriptan Unspecified     Historical=\"Heart stops.\" Reaction  between 1995 to 1997   • Vancomycin Shortness of Breath and Rash     Reaction in 2005.  D/W patient 8/31/15 - tolerated loading dose of vancomycin administered on 8/30/15 with some itching to chest with decreased infusion rate. Red Man Syndrome     Outpatient Encounter Prescriptions as of 3/15/2018   Medication Sig Dispense Refill   • predniSONE (DELTASONE) 5 MG Tab Take 5 mg po q day may increase on flare days by 5 mg po 40 Tab 1   • divalproex ER (DEPAKOTE ER) 500 MG TABLET SR 24 HR Take 1 Tab by mouth every evening. (Patient taking differently: Take 500 mg by mouth 3 times a day.) 30 Tab 0   • risperidone (RISPERDAL) 1 MG Tab Take 1 mg by mouth 2 times a day.     • aspirin (ASA) 81 MG Chew Tab chewable tablet Take 81 mg by mouth every day.     • ondansetron (ZOFRAN ODT) 4 MG TABLET DISPERSIBLE Take 1 Tab by mouth every 8 hours as needed for Nausea/Vomiting. 30 Tab 11   • omeprazole (PRILOSEC) 20 MG delayed-release capsule Take 1 Cap by mouth every day. 30 Cap 2   • gabapentin (NEURONTIN) 600 MG tablet Take 600 mg by mouth 3 times a day.     • oxycodone immediate release (ROXICODONE) 10 MG immediate release tablet Take 1 Tab by mouth every four hours as needed for Moderate Pain. 100 Tab 0   • duloxetine (CYMBALTA) 60 MG CPEP delayed-release capsule Take 60 mg by mouth 2 times a day.     • [DISCONTINUED] predniSONE (DELTASONE) 5 MG Tab 15 mg po q day x 5 days then 10 mg po q day x 10 days then 7.5 mg for 10 days then 5 mg po q day until seen (Patient taking differently: 10 mg. 15 mg po q day x 5 days then 10 mg po q day x 10 days then 7.5 mg for 10 days then 5 mg po q day until seen) 60 Tab 0   • fluconazole (DIFLUCAN) 100 MG Tab Take 2 Tabs by mouth every day. (Patient not taking: Reported on 2/22/2018) 1 Tab 0   • clindamycin (CLEOCIN) 300 MG Cap Take 300 mg by mouth 4 times a day.       No " "facility-administered encounter medications on file as of 3/15/2018.        Social History     Social History   • Marital status:      Spouse name: N/A   • Number of children: N/A   • Years of education: N/A     Occupational History   •       on disability     Social History Main Topics   • Smoking status: Never Smoker   • Smokeless tobacco: Never Used   • Alcohol use Yes      Comment: Rare   • Drug use: No   • Sexual activity: Not on file     Other Topics Concern   • Not on file     Social History Narrative   • No narrative on file      History   Smoking Status   • Never Smoker   Smokeless Tobacco   • Never Used     History   Alcohol Use   • Yes     Comment: Rare     History   Drug Use No      OB History   No data available      No LMP recorded.    G P A L     Family History   Problem Relation Age of Onset   • No Known Problems Mother    • No Known Problems Father        Review of Systems   Constitutional: Negative for chills and fever.   Respiratory: Negative for cough and hemoptysis.    Gastrointestinal: Negative for abdominal pain.   Musculoskeletal: Positive for joint pain.        Hand swelling and joint pain   Skin: Positive for rash.                  Objective:   /70   Pulse 100   Temp 36.9 °C (98.5 °F)   Resp 16   Ht 1.575 m (5' 2\")   Wt 78.5 kg (173 lb)   SpO2 98%   BMI 31.64 kg/m²     Vitals:    03/15/18 1459   BP: 110/70   Pulse: 100   Resp: 16   Temp: 36.9 °C (98.5 °F)   SpO2: 98%   Weight: 78.5 kg (173 lb)   Height: 1.575 m (5' 2\")         Physical Exam   Constitutional: She is oriented to person, place, and time. She appears well-developed and well-nourished. No distress.   HENT:   Head: Normocephalic and atraumatic.   Right Ear: External ear normal.   Left Ear: External ear normal.   Eyes: Conjunctivae are normal. Pupils are equal, round, and reactive to light. Right eye exhibits no discharge. Left eye exhibits no discharge. No scleral icterus.   Cardiovascular: " Normal rate, regular rhythm and normal heart sounds.    Pulmonary/Chest: Effort normal. No stridor. No respiratory distress.   Musculoskeletal: She exhibits no edema.   Today the left hand remains swollen with soft tissue swelling. Good ROM of her wrists bilateral flexion and extension.   No periarticular swelling of the MCP, PIP of the right hand. Good ROM flexion and extension of elbows bilateral. Left foot in a boot   Lymphadenopathy:     She has no cervical adenopathy.   Neurological: She is alert and oriented to person, place, and time.   Skin: Skin is warm and dry. She is not diaphoretic.   No alopecia.  On the dorsum of the hand just distal to the wrist wound with dressing over it   Psychiatric: She has a normal mood and affect. Her behavior is normal. Judgment and thought content normal.       Assessment:     1. Encounter for long-term (current) use of high-risk medication  CBC WITH DIFFERENTIAL    COMP METABOLIC PANEL    CRP QUANTITIVE (NON-CARDIAC)    WESTERGREN SED RATE    DS-BONE DENSITY STUDY (DEXA)   2. Current chronic use of systemic steroids  CBC WITH DIFFERENTIAL    COMP METABOLIC PANEL    CRP QUANTITIVE (NON-CARDIAC)    WESTERGREN SED RATE    DS-BONE DENSITY STUDY (DEXA)   3. Swelling of joint of hand, unspecified laterality     4. Seronegative rheumatoid arthritis (CMS-[facility])     5. Skin ulcer, unspecified ulcer stage (CMS-HCC)       Labs:    Lab Results   Component Value Date/Time    QNTTBGOLD Negative 01/09/2017 06:01 PM     Lab Results   Component Value Date/Time    HEPBCORIGM Negative 05/21/2017 02:07 AM    HEPBSAG Negative 05/21/2017 02:07 AM     Lab Results   Component Value Date/Time    HEPCAB Negative 05/21/2017 02:07 AM     Lab Results   Component Value Date/Time    SODIUM 140 02/22/2018 09:37 AM    POTASSIUM 3.6 02/22/2018 09:37 AM    CHLORIDE 106 02/22/2018 09:37 AM    CO2 23 02/22/2018 09:37 AM    GLUCOSE 96 02/22/2018 09:37 AM    BUN 11 02/22/2018 09:37 AM    CREATININE 0.69 02/22/2018  09:37 AM      Lab Results   Component Value Date/Time    WBC 5.7 11/22/2017 01:53 AM    RBC 3.77 (L) 11/22/2017 01:53 AM    HEMOGLOBIN 11.5 (L) 11/22/2017 01:53 AM    HEMATOCRIT 35.8 (L) 11/22/2017 01:53 AM    MCV 95.0 11/22/2017 01:53 AM    MCH 30.5 11/22/2017 01:53 AM    MCHC 32.1 (L) 11/22/2017 01:53 AM    MPV 9.7 11/22/2017 01:53 AM    NEUTSPOLYS 63.40 11/22/2017 01:53 AM    LYMPHOCYTES 27.90 11/22/2017 01:53 AM    MONOCYTES 7.00 11/22/2017 01:53 AM    EOSINOPHILS 0.70 11/22/2017 01:53 AM    BASOPHILS 0.70 11/22/2017 01:53 AM    HYPOCHROMIA 1+ 01/11/2017 02:29 PM    ANISOCYTOSIS 1+ 01/11/2017 02:29 PM      Lab Results   Component Value Date/Time    CALCIUM 9.2 02/22/2018 09:37 AM    ASTSGOT 14 02/22/2018 09:37 AM    ALTSGPT 15 02/22/2018 09:37 AM    ALKPHOSPHAT 46 02/22/2018 09:37 AM    TBILIRUBIN 0.4 02/22/2018 09:37 AM    ALBUMIN 4.4 02/22/2018 09:37 AM    ALBUMIN 4.69 11/06/2017 10:23 AM    TOTPROTEIN 7.1 02/22/2018 09:37 AM    TOTPROTEIN 7.50 11/06/2017 10:23 AM     Lab Results   Component Value Date/Time    URICACID 4.3 09/15/2017 03:45 PM    RHEUMFACTN <10 10/14/2017 07:40 PM    ANTINUCAB None Detected 01/06/2017 04:12 AM     Lab Results   Component Value Date/Time    SEDRATEWES 6 02/22/2018 09:36 AM    CREACTPROT 0.07 02/22/2018 09:36 AM     Lab Results   Component Value Date/Time    RUSSELVIPER 40.3 09/15/2017 03:45 PM    DRVVTINTERP Not Present 09/15/2017 03:45 PM     Lab Results   Component Value Date/Time    J1IUEZLVPTO 178.0 01/06/2017 04:12 AM    S3YXQDFMEBU 37.0 01/06/2017 04:12 AM    IGGCARDIOLI 5 09/15/2017 03:45 PM    IGMCARDIOLI 6 09/15/2017 03:45 PM    IGACARDIOLI 2 09/15/2017 03:45 PM    CRYOGLOBULIN NEG 72Hour 04/17/2017 03:53 PM     Lab Results   Component Value Date/Time    EKCTFEI61 0 01/06/2017 04:12 AM    CENTROMBAB 2 01/06/2017 04:12 AM     Lab Results   Component Value Date/Time    ANCAIGG <1:20 01/06/2017 04:12 AM    C0GSFIALNVP 178.0 01/06/2017 04:12 AM    ANTISSBSJ 0 11/18/2017  04:30 AM     Lab Results   Component Value Date/Time    COLORURINE DK Yellow 10/15/2017 09:40 AM    SPECGRAVITY 1.010 10/15/2017 09:40 AM    PHURINE 5.5 10/15/2017 09:40 AM    GLUCOSEUR Negative 10/15/2017 09:40 AM    KETONES Negative 10/15/2017 09:40 AM    PROTEINURIN Negative 10/15/2017 09:40 AM     No results found for: TOTPROTUR, TOTALVOLUME, XEYALBYP04  Lab Results   Component Value Date/Time    SSA60 14 11/18/2017 04:30 AM    SSA52 0 11/18/2017 04:30 AM     Lab Results   Component Value Date/Time    HBA1C 5.0 11/16/2017 11:12 PM     Lab Results   Component Value Date/Time    CPKTOTAL 32 11/17/2017 04:43 AM     Lab Results   Component Value Date/Time    G6PD 20.4 (H) 01/10/2017 01:54 PM     Lab Results   Component Value Date/Time    CAOG50JZPN Negative 10/14/2017 07:40 PM     No results found for: ACESERUM  Lab Results   Component Value Date/Time    25HYDROXY 62 02/22/2018 09:36 AM     No results found for: TSH, FREEDIR  Lab Results   Component Value Date/Time    TSHULTRASEN 1.980 11/16/2017 11:12 PM    FREET4 0.78 11/20/2017 03:00 AM     Lab Results   Component Value Date/Time    MICROSOMALA 27.3 (H) 02/22/2018 09:36 AM    ANTITHYROGL <0.9 02/22/2018 09:36 AM     No results found for: IGGLYMEABS  No results found for: ANTIMITOCHO, FACTIN  No results found for: IGA, TTRANSIGA, ENDOIGA  No results found for: FLTYPE, CRYSTALSBDF  No results found for: ISTATICAL  No results found for: ISTATCREAT  No results found for: CTELOPEP  No results found for: GBMABG  No results found for: PTHINTACT  1/27/2015 hepatitis E IgM    1/27/2015 ceruloplasmin = 29.7  1/27/2015 smith antibody = 9 (normal)  1/27/2015 MG negative  1/27/2015 alpha antitrypsin 119 ()  1/27/2015 ferritin = 75 (normal)  1/27/2015 GGT = 327 (0-55)    1/27/2015 IgG 4 = 11  10/31/2016 and 1/27/2015  p ANCA (-), c ANCA (-), PR3 (-)  10/31/2016 MPO (-)  12/7/2014 AST = 23 ALT = 137 Alk phos = 205  6/10/2016 ALT = 921 AST = 821 Alk phos = 537  Labs from  JUne 2016 shows low albumin 2.7 and AST 92 and ALT of 370    6/11/2016; AST=92, ALT  = 370 Alk Phos = 331 Hgb = 8.6 MCV = 74.7 t bili = 1.2  10/15/2015: Hgb = 9.1 Plt =   ALT= 357 AST =127 Alk phos = 469 creatinine = 0.7    US LE venous doppler bilateral 6/30/2014  Probable baker's cyst on left lower extremity    Left hand XR 8/14/2014  Soft tissue swelling over the dorsum.  No erorsons no fractures     CT abdomen/Pelvis w/o Contrast 9/4/2014  Small amount of fluid in cul-de-sac no abnormalities in liver.  Abdominal aorta was normal.  Adrenal glands normal.  coritical medullatry calcifications noted of left kidney    CT head w w/o contrast 10/15/2016  No acute abnormalities and on CT maxillofacial w/ contrast - no evidence of sinusitis    CTchest w/con 1/12/2016  No findings to suggest aortic dissection or pulmonary embolus  Left renal 18 mm cyst in the ventral cortex medially    CT spine lumbar w/o contrast  8/30/2015  Small fluid  Collection posterior junction of the epidural catheter seen right lateral to the L2 posterior spinous process consistent with leakage and/or infection      Blood work performed at outside labs dated May 6, 2017 white blood cell count of 7 total protein is 7.8 albumin is 4.1 monocytes 1010.7 neutrophils were normal at 5.33 and lymphocytes were normal at 9 point correction 0.94 platelets were 233 HEENT hemoglobin  Labs from June 22, 2017 showed a hemoglobin of 14.3 AST was normalized at 27 ALT was elevated at 71 and phosphatase was normal at 180 transferrin saturation was normal at 15% creatinine was normal at 0.6  Transglutaminase IgA antibody was negative on June 22, 2017  Creatinine IgA antibody was pending. On June 22, 2017 endomysial IgA antibody was negative on June 22, 2017  White blood cell was 8.6 platelets were 423 on June 22, 2017  IgG total was 813 which is normal on June 22, 2017 IgA total was 130 which is normal and June 22, 2017  Ceruloplasmin was normal at 35.2 on June 22,  2017 and alpha-1 antitrypsin in the serum was 142 which is normal on June 22, 2017    TTG antibody was negative on June 22, 2017  IgG subclass  IgG 2 subclass was normal at 311, IgG4 subclass was normal at 8, IgG1 subclass was normal at 676 and IgG 3 subclass was slightly elevated at 122 (15-1 02)  Ferritin was normal at 43 on June 22, 2017 creatinine IgG antibody was negative on June 22, 2017. On May 6, 2017 AST was 31 and ALT was normal at 54    Medical Decision Making:  Today's Assessment / Status / Plan:     Skin ulcerations  Wound care notes suggests pyoderma.  I would like to see [place]'s review of the skin biopsy.  I would be cautious proceeding with anti TNF therapy since she has a history of recurrent hardware infections.  We can continue predniseon 5 mg po q day.      Since her hospitalization her ulcerations still persist  She is being followed by wound care    RS3PE cs calciphylaxis  there is mild periarticular osteopenia on xray. CT of the right  hand noted soft tissue swelling and concern for cellulitis.  Our challenge has been her unexplained recurrent transaminitis.  Pending labs CBC and CMP we will restart plaquenil at 300 mg po q day.      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 Encounter for long-term (current) use of high-risk medication  CBC WITH DIFFERENTIAL    COMP METABOLIC PANEL    CRP QUANTITIVE (NON-CARDIAC)    WESTERGREN SED RATE    DS-BONE DENSITY STUDY (DEXA)   2. Current chronic use of systemic steroids  CBC WITH DIFFERENTIAL    COMP METABOLIC PANEL    CRP QUANTITIVE (NON-CARDIAC)    WESTERGREN SED RATE    DS-BONE DENSITY STUDY (DEXA)   3. Swelling of joint of hand, unspecified laterality      4. Seronegative rheumatoid arthritis (CMS-HCC)     5. Skin ulcer, unspecified ulcer stage (CMS-HCC)       Return in about 3 months (around 6/15/2018).    I have spent greater than 50% of this 30 minute visit in counseling/coordination of care with the patient regarding therapy plan    She agreed and verbalized her agreement and understanding with the current plan. I answered all questions and concerns she has at this time and advised her to call at any time in there interim with questions or concerns in regards to her care.    Thank you for allowing me to participate in her care, I will continue to follow closely.

## 2018-03-26 ENCOUNTER — TELEPHONE (OUTPATIENT)
Dept: NEUROLOGY | Facility: MEDICAL CENTER | Age: 48
End: 2018-03-26

## 2018-03-28 ENCOUNTER — TELEPHONE (OUTPATIENT)
Dept: RHEUMATOLOGY | Facility: PHYSICIAN GROUP | Age: 48
End: 2018-03-28

## 2018-03-28 DIAGNOSIS — R74.01 TRANSAMINITIS: ICD-10-CM

## 2018-03-28 NOTE — TELEPHONE ENCOUNTER
Noted elevated LFT    She is taking motrin and again tried the depakote  Recommended to stop the depakote and motrin.  SHe already does not take alcohol.  She was previously on depakote and this previously was thought to contribute to the raised LFT.    Advised patient to reach out to her gastroenterologist    Also will repeat the LFT again tomorrow to follow closely.  Labs ordered. Advise patient to come and  labs

## 2018-03-29 ENCOUNTER — HOSPITAL ENCOUNTER (OUTPATIENT)
Dept: RADIOLOGY | Facility: MEDICAL CENTER | Age: 48
End: 2018-03-29

## 2018-04-02 ENCOUNTER — APPOINTMENT (OUTPATIENT)
Dept: RADIOLOGY | Facility: MEDICAL CENTER | Age: 48
End: 2018-04-02
Attending: INTERNAL MEDICINE
Payer: MEDICARE

## 2018-04-03 ENCOUNTER — HOSPITAL ENCOUNTER (OUTPATIENT)
Dept: HOSPITAL 8 - CFH | Age: 48
End: 2018-04-03
Attending: INTERNAL MEDICINE
Payer: COMMERCIAL

## 2018-04-03 DIAGNOSIS — N95.9: ICD-10-CM

## 2018-04-03 DIAGNOSIS — Z13.820: Primary | ICD-10-CM

## 2018-04-03 PROCEDURE — 77080 DXA BONE DENSITY AXIAL: CPT

## 2018-04-06 NOTE — PROGRESS NOTES
Future direct admit for 4/9/2018 @ 0600 by Dr. Ribera for Inpatient Long-term EEG Monitoring.  Diagnosis of Epilepsy.  ADT signed & held on 4/6/2018 @ 1310, needs to be released upon pt arrival.  Written orders received, faxed to unit, and scanned to media tab.  Pt coming by private vehicle.

## 2018-04-09 ENCOUNTER — HOSPITAL ENCOUNTER (INPATIENT)
Facility: MEDICAL CENTER | Age: 48
LOS: 4 days | DRG: 103 | End: 2018-04-13
Attending: PSYCHIATRY & NEUROLOGY | Admitting: PSYCHIATRY & NEUROLOGY
Payer: MEDICARE

## 2018-04-09 ENCOUNTER — HOSPITAL ENCOUNTER (OUTPATIENT)
Facility: MEDICAL CENTER | Age: 48
DRG: 103 | End: 2018-04-09
Payer: MEDICARE

## 2018-04-09 PROCEDURE — 770020 HCHG ROOM/CARE - TELE (206)

## 2018-04-09 PROCEDURE — 700111 HCHG RX REV CODE 636 W/ 250 OVERRIDE (IP): Performed by: PSYCHIATRY & NEUROLOGY

## 2018-04-09 PROCEDURE — 306580 SET INFUSION,POWERLOC 20G X.75: Performed by: PSYCHIATRY & NEUROLOGY

## 2018-04-09 PROCEDURE — 95951 EEG: CPT

## 2018-04-09 PROCEDURE — A9270 NON-COVERED ITEM OR SERVICE: HCPCS | Performed by: PSYCHIATRY & NEUROLOGY

## 2018-04-09 PROCEDURE — 700102 HCHG RX REV CODE 250 W/ 637 OVERRIDE(OP): Performed by: PSYCHIATRY & NEUROLOGY

## 2018-04-09 PROCEDURE — 4A10X4Z MONITORING OF CENTRAL NERVOUS ELECTRICAL ACTIVITY, EXTERNAL APPROACH: ICD-10-PCS | Performed by: PSYCHIATRY & NEUROLOGY

## 2018-04-09 RX ORDER — DIPHENHYDRAMINE HYDROCHLORIDE 50 MG/ML
50 INJECTION INTRAMUSCULAR; INTRAVENOUS EVERY 6 HOURS PRN
Status: DISCONTINUED | OUTPATIENT
Start: 2018-04-09 | End: 2018-04-09

## 2018-04-09 RX ORDER — ASPIRIN 81 MG/1
81 TABLET, CHEWABLE ORAL DAILY
Status: DISCONTINUED | OUTPATIENT
Start: 2018-04-10 | End: 2018-04-13 | Stop reason: HOSPADM

## 2018-04-09 RX ORDER — OMEPRAZOLE 20 MG/1
20 CAPSULE, DELAYED RELEASE ORAL DAILY
Status: DISCONTINUED | OUTPATIENT
Start: 2018-04-10 | End: 2018-04-13 | Stop reason: HOSPADM

## 2018-04-09 RX ORDER — DULOXETIN HYDROCHLORIDE 60 MG/1
60 CAPSULE, DELAYED RELEASE ORAL 2 TIMES DAILY
Status: DISCONTINUED | OUTPATIENT
Start: 2018-04-09 | End: 2018-04-13 | Stop reason: HOSPADM

## 2018-04-09 RX ORDER — DIPHENHYDRAMINE HYDROCHLORIDE 50 MG/ML
50 INJECTION INTRAMUSCULAR; INTRAVENOUS EVERY 6 HOURS PRN
COMMUNITY
End: 2020-01-28

## 2018-04-09 RX ORDER — DIPHENHYDRAMINE HYDROCHLORIDE 50 MG/ML
50 INJECTION INTRAMUSCULAR; INTRAVENOUS EVERY 6 HOURS PRN
Status: DISCONTINUED | OUTPATIENT
Start: 2018-04-09 | End: 2018-04-11

## 2018-04-09 RX ORDER — OXYCODONE HYDROCHLORIDE 10 MG/1
10 TABLET ORAL EVERY 4 HOURS PRN
Status: DISCONTINUED | OUTPATIENT
Start: 2018-04-09 | End: 2018-04-13 | Stop reason: HOSPADM

## 2018-04-09 RX ORDER — GABAPENTIN 300 MG/1
600 CAPSULE ORAL 3 TIMES DAILY
Status: DISCONTINUED | OUTPATIENT
Start: 2018-04-09 | End: 2018-04-13 | Stop reason: HOSPADM

## 2018-04-09 RX ORDER — KETOROLAC TROMETHAMINE 30 MG/ML
60 INJECTION, SOLUTION INTRAMUSCULAR; INTRAVENOUS EVERY 6 HOURS PRN
Status: ON HOLD | COMMUNITY
End: 2019-01-18

## 2018-04-09 RX ORDER — ONDANSETRON 4 MG/1
4 TABLET, ORALLY DISINTEGRATING ORAL EVERY 8 HOURS PRN
Status: DISCONTINUED | OUTPATIENT
Start: 2018-04-09 | End: 2018-04-13 | Stop reason: HOSPADM

## 2018-04-09 RX ORDER — KETOROLAC TROMETHAMINE 30 MG/ML
60 INJECTION, SOLUTION INTRAMUSCULAR; INTRAVENOUS
Status: DISCONTINUED | OUTPATIENT
Start: 2018-04-09 | End: 2018-04-13 | Stop reason: HOSPADM

## 2018-04-09 RX ORDER — RISPERIDONE 0.5 MG/1
1 TABLET ORAL 2 TIMES DAILY
Status: DISCONTINUED | OUTPATIENT
Start: 2018-04-09 | End: 2018-04-13 | Stop reason: HOSPADM

## 2018-04-09 RX ORDER — RISPERIDONE 0.5 MG/1
1 TABLET ORAL 2 TIMES DAILY
COMMUNITY
End: 2018-06-13

## 2018-04-09 RX ORDER — PREDNISONE 5 MG/1
5 TABLET ORAL EVERY MORNING
COMMUNITY
End: 2018-10-11

## 2018-04-09 RX ORDER — DIVALPROEX SODIUM 500 MG/1
500 TABLET, EXTENDED RELEASE ORAL 3 TIMES DAILY
Status: DISCONTINUED | OUTPATIENT
Start: 2018-04-09 | End: 2018-04-09

## 2018-04-09 RX ADMIN — OXYCODONE HYDROCHLORIDE 10 MG: 10 TABLET ORAL at 15:16

## 2018-04-09 RX ADMIN — DIPHENHYDRAMINE HYDROCHLORIDE 50 MG: 50 INJECTION, SOLUTION INTRAMUSCULAR; INTRAVENOUS at 18:08

## 2018-04-09 RX ADMIN — OXYCODONE HYDROCHLORIDE 10 MG: 10 TABLET ORAL at 10:33

## 2018-04-09 RX ADMIN — OXYCODONE HYDROCHLORIDE 10 MG: 10 TABLET ORAL at 20:07

## 2018-04-09 RX ADMIN — DIPHENHYDRAMINE HYDROCHLORIDE 50 MG: 50 INJECTION, SOLUTION INTRAMUSCULAR; INTRAVENOUS at 12:07

## 2018-04-09 RX ADMIN — GABAPENTIN 600 MG: 300 CAPSULE ORAL at 20:07

## 2018-04-09 RX ADMIN — RISPERIDONE 1 MG: 0.5 TABLET ORAL at 20:07

## 2018-04-09 RX ADMIN — GABAPENTIN 600 MG: 300 CAPSULE ORAL at 15:16

## 2018-04-09 RX ADMIN — KETOROLAC TROMETHAMINE 60 MG: 30 INJECTION, SOLUTION INTRAMUSCULAR at 18:08

## 2018-04-09 RX ADMIN — DULOXETINE HYDROCHLORIDE 60 MG: 60 CAPSULE, DELAYED RELEASE ORAL at 20:07

## 2018-04-09 ASSESSMENT — PAIN SCALES - GENERAL
PAINLEVEL_OUTOF10: 9
PAINLEVEL_OUTOF10: 8
PAINLEVEL_OUTOF10: 9
PAINLEVEL_OUTOF10: 9
PAINLEVEL_OUTOF10: 6

## 2018-04-09 ASSESSMENT — LIFESTYLE VARIABLES
EVER_SMOKED: NEVER
ALCOHOL_USE: NO

## 2018-04-09 NOTE — H&P
NEUROLOGY NOTE      CHIEF COMPLAINT:  Spells of flashing light and shivering of eyelids  No chief complaint on file.      PRESENT ILLNESS:     pt with episodes of flashing lights x 12 + years but more intense lately. pt with hx of migraine, depresion, anxiety. no known hx of sz. 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  Apparently earlier this month she was admitted for   intractable headache and was discharged to have followup with neurology   clinic; however, she has not seen a neurologist as of yet.  She was discharged   on 05/07/2017.  As mentioned, she had a long-standing history of migraine   headache.  At one point, she was on Topamax for headache prevention, which   failed to improve her headache.  She has tried Depakote, but apparently had   some drug-induced hepatitis with elevated liver enzymes and this was also   discontinued.  She has tried OxyContin for headache   relief.  At some point, she was seen by Dr. Dillan Handley who performs Botox   injection.  Patient also tells me she has increased intracranial pressure for   which she was placed on Diamox.     1. Intractable Headache since age of 13YO  post botox ([facility]ma Reece Pain Specialist--- botox, occipital nerve block, oxycontin)--  2. Intractable spells-- started with eyelid twitching since 2009  3. Status Post occipital nerve stimulator 2003 in the [place]--status post 6 revisions-- in [place]  4. Arthritis? Seeing specialist in [place]( was referred to [place] by Rheumatologist here)  PAST MEDICAL HISTORY:  Past Medical History:   Diagnosis Date   • Allergy, unspecified not elsewhere classified    • Anemia 10/05/2017    Unsure if a current issue.   • Anxiety    • autoimmune    • Depression    • H/O Clostridium  difficile infection    • Hx MRSA infection    • Hydrocephalus     with lumbar shunt   • Migraine with aura, with intractable migraine, so stated, without mention of status migrainosus    • MRSA (methicillin resistant Staphylococcus aureus)    • Pituitary abnormality (CMS-HCC)    • Staph infection    • Stroke (CMS-HCC)     2007       PAST SURGICAL HISTORY:  Past Surgical History:   Procedure Laterality Date   •  SHUNT INSERTION  5/31/2017    Procedure:  SHUNT INSERTION;  Surgeon: Drew Berry M.D.;  Location: SURGERY [facility];  Service:    • SPINAL CORD STIMULATOR  12/19/2016    Procedure: SPINAL CORD STIMULATOR FOR: PLACEMENT OF LEFT FLANK OCCIPITAL NERVE STIMULATOR BATTERY PACK;  Surgeon: Oli Oh III, M.D.;  Location: SURGERY [facility];  Service:    • LUMBAR EXPLORATION N/A 8/31/2015    Procedure: LUMBAR EXPLORATION- Lumbar shunt removal ;  Surgeon: Oli Oh III, M.D.;  Location: SURGERY [facility];  Service:    • IRRIGATION & DEBRIDEMENT NEURO N/A 6/28/2015    Procedure: IRRIGATION & DEBRIDEMENT NEURO;  Surgeon: Oli Oh III, M.D.;  Location: SURGERY [facility];  Service:    • APPENDECTOMY     • CHOLECYSTECTOMY     • OTHER      right knee surgery   • OTHER NEUROLOGICAL SURG      occipital nerve stimulator   • TONSILLECTOMY     • TUBAL LIGATION         FAMILY HISTORY:  Family History   Problem Relation Age of Onset   • No Known Problems Mother    • No Known Problems Father        SOCIAL HISTORY:  Social History     Social History   • Marital status:      Spouse name: N/A   • Number of children: N/A   • Years of education: N/A     Occupational History   •       on disability     Social History Main Topics   • Smoking status: Never Smoker   • Smokeless tobacco: Never Used   • Alcohol use Yes      Comment: Rare   • Drug use: No   • Sexual activity: Not on file     Other Topics Concern   • Not on file     Social History Narrative   • No narrative on  "file     ALLERGIES:  Allergies   Allergen Reactions   • Prochlorperazine Swelling     Tongue swelling. Reaction as a teen. (compazine)  Tolerated Phenergan on 02/24/15   • Sumatriptan Unspecified     Historical=\"Heart stops.\" Reaction  between 1995 to 1997   • Vancomycin Shortness of Breath and Rash     Reaction in 2005.  D/W patient 8/31/15 - tolerated loading dose of vancomycin administered on 8/30/15 with some itching to chest with decreased infusion rate. Red Man Syndrome     TOBHX  History   Smoking Status   • Never Smoker   Smokeless Tobacco   • Never Used     ALCHX  History   Alcohol Use   • Yes     Comment: Rare     DRUGHX  History   Drug Use No           MEDICATIONS:  Current Facility-Administered Medications   Medication Dose   • aspirin (ASA) chewable tab 81 mg  81 mg   • diphenhydrAMINE (BENADRYL) injection 50 mg  50 mg   • divalproex ER (DEPAKOTE ER) tablet 500 mg  500 mg   • DULoxetine (CYMBALTA) capsule 60 mg  60 mg   • gabapentin (NEURONTIN) tablet 600 mg  600 mg   • omeprazole (PRILOSEC) capsule 20 mg  20 mg   • ondansetron (ZOFRAN ODT) dispertab 4 mg  4 mg   • oxyCODONE immediate release (ROXICODONE) tablet 10 mg  10 mg   • risperiDONE (RISPERDAL) tablet 1 mg  1 mg       REVIEW OF SYSTEM:    Constitutional: Denies fevers, Denies weight changes   Eyes: Denies changes in vision, no eye pain   Ears/Nose/Throat/Mouth: Denies nasal congestion or sore throat   Cardiovascular: Denies chest pain or palpitations   Respiratory: Denies SOB.   Gastrointestinal/Hepatic: Denies abdominal pain, nausea, vomiting, diarrhea, constipation or GI bleeding   Genitourinary: Denies bladder dysfunction, dysuria or frequency   Musculoskeletal/Rheum: Denies joint pain and swelling   Skin/Breast: Denies rash, denies breast lumps or discharge   Neurological: intractable headache, occipital stimulator ( 2016 occipital stimulator battery ) off  Psychiatric: denies mood disorder   Endocrine: denies hx of diabetes or thyroid " dysfunction   Heme/Oncology/Lymph Nodes: Denies enlarged lymph nodes, denies brusing or known bleeding disorder   Allergic/Immunologic: Denies hx of allergies     PHYSICAL AND NEUROLOGICAL EXMAINATIONS:  VITAL SIGNS: /96   Pulse (!) 110   Temp 36.8 °C (98.3 °F)   Resp 18   Wt 82.4 kg (181 lb 10.5 oz)   SpO2 94%   BMI 33.23 kg/m²   CURRENT WEIGHT:  BMI: Body mass index is 33.23 kg/m².  PREVIOUS WEIGHTS:  Wt Readings from Last 25 Encounters:   04/09/18 82.4 kg (181 lb 10.5 oz)   03/15/18 78.5 kg (173 lb)   02/22/18 76.4 kg (168 lb 6.9 oz)   02/08/18 76.2 kg (168 lb)   12/14/17 76 kg (167 lb 9.6 oz)   12/12/17 76.7 kg (169 lb)   11/19/17 74.3 kg (163 lb 12.8 oz)   11/08/17 75.8 kg (167 lb)   11/07/17 75.8 kg (167 lb)   11/06/17 75.8 kg (167 lb 1.7 oz)   11/02/17 75.6 kg (166 lb 11.2 oz)   11/01/17 75.8 kg (167 lb)   10/15/17 72.9 kg (160 lb 11.5 oz)   10/11/17 74.6 kg (164 lb 7.4 oz)   10/07/17 72.5 kg (159 lb 13.3 oz)   10/05/17 74.8 kg (165 lb)   09/15/17 76.2 kg (168 lb)   08/29/17 74.8 kg (165 lb)   08/16/17 74.8 kg (165 lb)   07/25/17 74.5 kg (164 lb 3.2 oz)   07/24/17 73.3 kg (161 lb 9.6 oz)   07/13/17 73.3 kg (161 lb 9.6 oz)   07/13/17 75.2 kg (165 lb 12.8 oz)   06/15/17 73.5 kg (162 lb)   06/01/17 75.1 kg (165 lb 9.1 oz)       General appearance of patient: WDWN(+) NAD(+)    EYES  o Fundus : Papilledem(-) Exudates(-) Hemorrhage(-)  Nervous System  Orientation to time, place and person(+)  Memory normal(-)  Language: aphasia(-)  Knowledge: past(+) Current(+)  Attention(+)  Cranial Nerves  • Nerve 2: intact  • Nerve 3,4,6: intact  • Nerve 5 : intact  • Nerve 7: intact  • Nerve 8: intact  • Nerve 9 & 10: intact  • Nerve 11: intact  • Nerve 12: intact  Muscle Power and muscle tone: symmetric, normal in upper and lower  Sensory System: Pin sensation intact(+)  Reflexes: symmetric throughout  Cerebellar Function FNP normal   Gait : Steady(+) Heart and Vascular  Peripheral Vasucular system : Edema (-)  Swelling(-)  RHB, Breathing sound clear  abdomen bowel sound normoactive  Extremities freely moveable  Joints no contracture       NEUROIMAGING: I reviewed the MRI/CT of brain       LAB:          IMPRESSION:        1. Intractable Headache since age of 13 YO  post Botox (Uma Reece Pain Specialist--- Botox, occipital nerve block, OxyContin)--  2. Intractable spells-- started with eyelid twitching since 2009  3. Status Post occipital nerve stimulator 2003 in the [place]--status post 6 revisions-- in [place]  4. Arthritis? Seeing specialist in [place]( was referred to [place] by Rheumatologist here)  5. Status post  shunt 2017 (under the impression of pseudotumor cerebri). Lumbar shunt in [place] for the pseudotumor cerebri--- complicated with infection     PLAN/RECOMMENDATIONS:        The patient went through many procedures related with headache-- including lumbar shunt,  shunt and occipital nerve stimulator( not MRI compatible)        The patient is here for spells--- one spell recorded in the hospital was psychogenic, however, the patient's routine EEG is not normal either  With shunt in the brain, we are not surprised that the patient's EEG is not normal either  The patient could have both psychogenic and real epileptic seizures        We will start  5 days of video EEG                  SIGNATURE:  Drew Ribera

## 2018-04-09 NOTE — DISCHARGE PLANNING
Medical Social Work    Referral:  Review    Intervention:  Pt is new to floor. SW reviewed pt's chart. No anticipated SW needs    Plan:  SW will remain available for dc planning

## 2018-04-09 NOTE — PROGRESS NOTES
Attention order for seizure pads. Our department does not carry this item. Neuro department will need to provide from their own supply.

## 2018-04-10 PROCEDURE — 700102 HCHG RX REV CODE 250 W/ 637 OVERRIDE(OP): Performed by: PSYCHIATRY & NEUROLOGY

## 2018-04-10 PROCEDURE — 700111 HCHG RX REV CODE 636 W/ 250 OVERRIDE (IP): Performed by: PSYCHIATRY & NEUROLOGY

## 2018-04-10 PROCEDURE — 95951 HCHG EEG-VIDEO-24HR: CPT

## 2018-04-10 PROCEDURE — 770020 HCHG ROOM/CARE - TELE (206)

## 2018-04-10 PROCEDURE — A9270 NON-COVERED ITEM OR SERVICE: HCPCS | Performed by: PSYCHIATRY & NEUROLOGY

## 2018-04-10 RX ADMIN — DIPHENHYDRAMINE HYDROCHLORIDE 50 MG: 50 INJECTION, SOLUTION INTRAMUSCULAR; INTRAVENOUS at 20:31

## 2018-04-10 RX ADMIN — OMEPRAZOLE 20 MG: 20 CAPSULE, DELAYED RELEASE ORAL at 08:59

## 2018-04-10 RX ADMIN — DIPHENHYDRAMINE HYDROCHLORIDE 50 MG: 50 INJECTION, SOLUTION INTRAMUSCULAR; INTRAVENOUS at 01:14

## 2018-04-10 RX ADMIN — RISPERIDONE 1 MG: 0.5 TABLET ORAL at 20:31

## 2018-04-10 RX ADMIN — GABAPENTIN 600 MG: 300 CAPSULE ORAL at 14:21

## 2018-04-10 RX ADMIN — DIPHENHYDRAMINE HYDROCHLORIDE 50 MG: 50 INJECTION, SOLUTION INTRAMUSCULAR; INTRAVENOUS at 07:18

## 2018-04-10 RX ADMIN — OXYCODONE HYDROCHLORIDE 10 MG: 10 TABLET ORAL at 13:03

## 2018-04-10 RX ADMIN — RISPERIDONE 1 MG: 0.5 TABLET ORAL at 08:59

## 2018-04-10 RX ADMIN — DULOXETINE HYDROCHLORIDE 60 MG: 60 CAPSULE, DELAYED RELEASE ORAL at 09:00

## 2018-04-10 RX ADMIN — KETOROLAC TROMETHAMINE 60 MG: 30 INJECTION, SOLUTION INTRAMUSCULAR at 18:10

## 2018-04-10 RX ADMIN — OXYCODONE HYDROCHLORIDE 10 MG: 10 TABLET ORAL at 09:00

## 2018-04-10 RX ADMIN — GABAPENTIN 600 MG: 300 CAPSULE ORAL at 20:31

## 2018-04-10 RX ADMIN — OXYCODONE HYDROCHLORIDE 10 MG: 10 TABLET ORAL at 21:20

## 2018-04-10 RX ADMIN — DULOXETINE HYDROCHLORIDE 60 MG: 60 CAPSULE, DELAYED RELEASE ORAL at 20:31

## 2018-04-10 RX ADMIN — OXYCODONE HYDROCHLORIDE 10 MG: 10 TABLET ORAL at 00:03

## 2018-04-10 RX ADMIN — GABAPENTIN 600 MG: 300 CAPSULE ORAL at 08:59

## 2018-04-10 RX ADMIN — OXYCODONE HYDROCHLORIDE 10 MG: 10 TABLET ORAL at 04:33

## 2018-04-10 RX ADMIN — OXYCODONE HYDROCHLORIDE 10 MG: 10 TABLET ORAL at 17:02

## 2018-04-10 RX ADMIN — ASPIRIN 81 MG: 81 TABLET, CHEWABLE ORAL at 09:00

## 2018-04-10 RX ADMIN — DIPHENHYDRAMINE HYDROCHLORIDE 50 MG: 50 INJECTION, SOLUTION INTRAMUSCULAR; INTRAVENOUS at 14:24

## 2018-04-10 ASSESSMENT — PAIN SCALES - GENERAL
PAINLEVEL_OUTOF10: 8
PAINLEVEL_OUTOF10: 8
PAINLEVEL_OUTOF10: 9
PAINLEVEL_OUTOF10: 8
PAINLEVEL_OUTOF10: 8
PAINLEVEL_OUTOF10: 7
PAINLEVEL_OUTOF10: 8
PAINLEVEL_OUTOF10: 8
PAINLEVEL_OUTOF10: 7
PAINLEVEL_OUTOF10: 8

## 2018-04-10 ASSESSMENT — LIFESTYLE VARIABLES: DO YOU DRINK ALCOHOL: NO

## 2018-04-10 NOTE — CARE PLAN
Problem: Communication  Goal: The ability to communicate needs accurately and effectively will improve  Encouraged patient to use call light if needing any assistance, verbalized understanding.    Problem: Safety  Goal: Will remain free from falls  Bed in lowest position, call light in place, hourly rounding in place. Will continue to monitor.

## 2018-04-10 NOTE — PROGRESS NOTES
Received report, assumed care. Pt is A+Ox4, pt states shes in pain 8/10 d/t headache. See MAR. Lung sounds CTA. Bed in lowest position, call light in place. Questions answered, needs met. Will continue to monitor. Hourly rounding in place.

## 2018-04-10 NOTE — CARE PLAN
Problem: Communication  Goal: The ability to communicate needs accurately and effectively will improve  Outcome: PROGRESSING AS EXPECTED  Patient able to communicate needs effectively and uses call light appropriately. RN hourly rounding in place.     Problem: Safety  Goal: Will remain free from injury  Outcome: PROGRESSING AS EXPECTED  Patient remains free from injury. Patient uses call light when necessary. Nursing staff assists with ambulation. Patient educated on injury prevention.

## 2018-04-10 NOTE — PROGRESS NOTES
Patient continues to complain of 9/10 pain. Patient requesting IM benedryl and oxycodone at the same time. RN educates patient that she cannot give the two meds together as they both cause respiratory depression. Patient becomes tearful and requests benedryl as soon as it is available.

## 2018-04-10 NOTE — PROGRESS NOTES
Pt is A&Ox4. Denies any n/v or n/t. States headache of 8/10. Medicated appropriately per MAR. Port accessed using sterile procedure. Blood return present. Wound of left foot POA. Pt states she receives wound care at [facility]. Photo taken, uploaded to , & wound consult placed. Pt updated on POC. All needs met at this time. Pt is instructed to call when in need of assistance. Bed in lowest and locked position. Call light within reach. Bed alarm and hourly rounding in place.   No seizure activity noted throughout shift.

## 2018-04-10 NOTE — PROGRESS NOTES
Patient resting quietly in bed, no S/S of distress, A&Ox4. EEG monitor in place. Denies SOB, chest pain, dizziness. Bed alarm on, call light within reach,  pt calls appropriately and does not get out of bed. Bed in lowest position, bed locked, RN and CNA numbers provided, no further needs at this time. No changes from EPIC. Hourly rounding in place.

## 2018-04-11 ENCOUNTER — TELEPHONE (OUTPATIENT)
Dept: RHEUMATOLOGY | Facility: PHYSICIAN GROUP | Age: 48
End: 2018-04-11

## 2018-04-11 PROCEDURE — 700111 HCHG RX REV CODE 636 W/ 250 OVERRIDE (IP): Performed by: PSYCHIATRY & NEUROLOGY

## 2018-04-11 PROCEDURE — 95951 HCHG EEG-VIDEO-24HR: CPT

## 2018-04-11 PROCEDURE — A9270 NON-COVERED ITEM OR SERVICE: HCPCS | Performed by: PSYCHIATRY & NEUROLOGY

## 2018-04-11 PROCEDURE — 770020 HCHG ROOM/CARE - TELE (206)

## 2018-04-11 PROCEDURE — 700102 HCHG RX REV CODE 250 W/ 637 OVERRIDE(OP): Performed by: PSYCHIATRY & NEUROLOGY

## 2018-04-11 RX ORDER — DIPHENHYDRAMINE HYDROCHLORIDE 50 MG/ML
50 INJECTION INTRAMUSCULAR; INTRAVENOUS EVERY 6 HOURS PRN
Status: DISCONTINUED | OUTPATIENT
Start: 2018-04-11 | End: 2018-04-13 | Stop reason: HOSPADM

## 2018-04-11 RX ADMIN — OXYCODONE HYDROCHLORIDE 10 MG: 10 TABLET ORAL at 11:04

## 2018-04-11 RX ADMIN — OXYCODONE HYDROCHLORIDE 10 MG: 10 TABLET ORAL at 23:14

## 2018-04-11 RX ADMIN — DULOXETINE HYDROCHLORIDE 60 MG: 60 CAPSULE, DELAYED RELEASE ORAL at 09:54

## 2018-04-11 RX ADMIN — DULOXETINE HYDROCHLORIDE 60 MG: 60 CAPSULE, DELAYED RELEASE ORAL at 20:03

## 2018-04-11 RX ADMIN — DIPHENHYDRAMINE HYDROCHLORIDE 50 MG: 50 INJECTION, SOLUTION INTRAMUSCULAR; INTRAVENOUS at 22:20

## 2018-04-11 RX ADMIN — DIPHENHYDRAMINE HYDROCHLORIDE 50 MG: 50 INJECTION, SOLUTION INTRAMUSCULAR; INTRAVENOUS at 09:53

## 2018-04-11 RX ADMIN — GABAPENTIN 600 MG: 300 CAPSULE ORAL at 15:01

## 2018-04-11 RX ADMIN — DIPHENHYDRAMINE HYDROCHLORIDE 50 MG: 50 INJECTION, SOLUTION INTRAMUSCULAR; INTRAVENOUS at 16:10

## 2018-04-11 RX ADMIN — RISPERIDONE 1 MG: 0.5 TABLET ORAL at 20:03

## 2018-04-11 RX ADMIN — KETOROLAC TROMETHAMINE 60 MG: 30 INJECTION, SOLUTION INTRAMUSCULAR at 18:00

## 2018-04-11 RX ADMIN — GABAPENTIN 600 MG: 300 CAPSULE ORAL at 09:53

## 2018-04-11 RX ADMIN — GABAPENTIN 600 MG: 300 CAPSULE ORAL at 20:03

## 2018-04-11 RX ADMIN — OXYCODONE HYDROCHLORIDE 10 MG: 10 TABLET ORAL at 15:01

## 2018-04-11 RX ADMIN — OMEPRAZOLE 20 MG: 20 CAPSULE, DELAYED RELEASE ORAL at 09:54

## 2018-04-11 RX ADMIN — OXYCODONE HYDROCHLORIDE 10 MG: 10 TABLET ORAL at 02:08

## 2018-04-11 RX ADMIN — OXYCODONE HYDROCHLORIDE 10 MG: 10 TABLET ORAL at 06:59

## 2018-04-11 RX ADMIN — ASPIRIN 81 MG: 81 TABLET, CHEWABLE ORAL at 09:53

## 2018-04-11 RX ADMIN — DIPHENHYDRAMINE HYDROCHLORIDE 50 MG: 50 INJECTION, SOLUTION INTRAMUSCULAR; INTRAVENOUS at 03:19

## 2018-04-11 RX ADMIN — OXYCODONE HYDROCHLORIDE 10 MG: 10 TABLET ORAL at 19:16

## 2018-04-11 RX ADMIN — RISPERIDONE 1 MG: 0.5 TABLET ORAL at 09:54

## 2018-04-11 ASSESSMENT — PAIN SCALES - GENERAL
PAINLEVEL_OUTOF10: 10
PAINLEVEL_OUTOF10: 8
PAINLEVEL_OUTOF10: 10
PAINLEVEL_OUTOF10: 10
PAINLEVEL_OUTOF10: 5
PAINLEVEL_OUTOF10: 9
PAINLEVEL_OUTOF10: 10
PAINLEVEL_OUTOF10: 4
PAINLEVEL_OUTOF10: 8
PAINLEVEL_OUTOF10: 8

## 2018-04-11 ASSESSMENT — ENCOUNTER SYMPTOMS
SHORTNESS OF BREATH: 0
DIZZINESS: 1
EYE REDNESS: 0
HEADACHES: 1
BLOOD IN STOOL: 1
FEVER: 0

## 2018-04-11 ASSESSMENT — LIFESTYLE VARIABLES
SUBSTANCE_ABUSE: 0
DO YOU DRINK ALCOHOL: NO

## 2018-04-11 NOTE — CARE PLAN
Problem: Safety  Goal: Will remain free from falls    Intervention: Assess risk factors for falls  Educated patient on use of call light, no slip socks on, bed lowest position. All needs attended to. Patient verbalized understanding.

## 2018-04-11 NOTE — PROGRESS NOTES
IMPRESSION:        1. Intractable Headache since age of 15 YO  post Botox (Uma Reece Pain Specialist--- Botox, occipital nerve block, OxyContin)--  2. Intractable spells-- started with eyelid twitching since 2009  3. Status Post occipital nerve stimulator 2003 in the [place]--status post 6 revisions-- in [place]  4. Arthritis? Seeing specialist here and [place]( was referred to [place] by Rheumatologist here)  5. Status post  shunt 2017 (under the impression of pseudotumor cerebri). Lumbar shunt in [place] for the pseudotumor cerebri--- complicated with infection  6. Hx of hepatic dysfunction     PLAN/RECOMMENDATIONS:        The patient went through many procedures related with headache-- including lumbar shunt,  shunt and occipital nerve stimulator( not MRI compatible)       The patient is interested to take the occipital stimulator-- which is not MRI compatible (mahcine ahs to be removed) and EEG compatible ( machine has to be turned off)     The patient is here for spells--- one spell recorded in the hospital was psychogenic, however, the patient's routine EEG is not normal either    With shunt in the brain, we are not surprised that the patient's EEG is not normal either  The patient could have both psychogenic and real epileptic seizures        Continue video EEG    Physical Exam   Constitutional: She is oriented to person, place, and time and well-developed, well-nourished, and in no distress.   HENT:   Head: Normocephalic.   Pulmonary/Chest: Effort normal.   Abdominal: Soft.   Musculoskeletal: Normal range of motion.   Neurological: She is alert and oriented to person, place, and time.   Skin: Skin is warm.         Vitals:    04/10/18 2000 04/11/18 0000 04/11/18 0400 04/11/18 0800   BP: 127/81 119/66 (!) 97/49 125/72   Pulse: (!) 102 91 82 86   Resp: 16 16 16 16   Temp: 36.4 °C (97.6 °F) 36.2 °C (97.1 °F) 36.4 °C (97.5 °F) 36.7 °C (98.1 °F)   SpO2: 93% 92% 92% 94%   Weight:         Review of  Systems   Constitutional: Negative for fever.   Eyes: Negative for redness.   Respiratory: Negative for shortness of breath.    Gastrointestinal: Positive for blood in stool.   Genitourinary: Negative for hematuria.   Musculoskeletal: Positive for joint pain.   Neurological: Positive for dizziness and headaches.   Psychiatric/Behavioral: Negative for substance abuse.

## 2018-04-11 NOTE — PROGRESS NOTES
Received bedside report from day shift Rn. Pt sitting up in bed. Family at bedside. A/O x4. Denies any needs at this time. Will continue to monitor and assess.

## 2018-04-11 NOTE — PROGRESS NOTES
Received report assumed care. Pt is A+Ox4. States pain 10/10, see MAR. Pts  at bedside, requested for me to call Dr. Ribera and have him contact Dr. Aimee Andrade, who contacted them, stating the patient needed blood work d/t elevated liver enzymes. I called Dr. Ribera he said he would look at patients chart, and if needing blood work, will have done in hospital. I passed on the message to the patient and . Questions answered, needs met. Bed in lowest position, call light in place, bed alarm on, hourly rounding in place. Will continue to monitor.

## 2018-04-11 NOTE — PROGRESS NOTES
IMPRESSION:        1. Intractable Headache since age of 13 YO  post Botox (Uma Reece Pain Specialist--- Botox, occipital nerve block, OxyContin)--  2. Intractable spells-- started with eyelid twitching since 2009  3. Status Post occipital nerve stimulator 2003 in the [place]--status post 6 revisions-- in [place]  4. Arthritis? Seeing specialist here and [place]( was referred to [place] by Rheumatologist here)  5. Status post  shunt 2017 (under the impression of pseudotumor cerebri). Lumbar shunt in [place] for the pseudotumor cerebri--- complicated with infection     PLAN/RECOMMENDATIONS:        The patient went through many procedures related with headache-- including lumbar shunt,  shunt and occipital nerve stimulator( not MRI compatible)       The patient is interested to take the occipital stimulator-- which is not MRI compatible (mahcine ahs to be removed) and EEG compatible ( machine has to be turned off)     The patient is here for spells--- one spell recorded in the hospital was psychogenic, however, the patient's routine EEG is not normal either    With shunt in the brain, we are not surprised that the patient's EEG is not normal either  The patient could have both psychogenic and real epileptic seizures        We will start  5 days of video EEG    Physical Exam   Constitutional: She is oriented to person, place, and time and well-developed, well-nourished, and in no distress.   HENT:   Head: Normocephalic.   Pulmonary/Chest: Effort normal.   Abdominal: Soft.   Musculoskeletal: Normal range of motion.   Neurological: She is alert and oriented to person, place, and time.   Skin: Skin is warm.         Vitals:    04/10/18 0400 04/10/18 0800 04/10/18 1521 04/10/18 2000   BP: 119/68 107/63 151/91 127/81   Pulse: 84 80 (!) 109 (!) 102   Resp: 14 12 16 16   Temp: 36.8 °C (98.2 °F) 36.1 °C (97 °F) 36.3 °C (97.4 °F) 36.4 °C (97.6 °F)   SpO2: 94% 93% 93% 93%   Weight:         Review of Systems    Constitutional: Negative for fever.   Eyes: Negative for redness.   Respiratory: Negative for shortness of breath.    Gastrointestinal: Positive for blood in stool.   Genitourinary: Negative for hematuria.   Musculoskeletal: Positive for joint pain.   Neurological: Positive for dizziness and headaches.   Psychiatric/Behavioral: Negative for substance abuse.

## 2018-04-11 NOTE — CARE PLAN
Problem: Communication  Goal: The ability to communicate needs accurately and effectively will improve  Encouraged patient to use call light if needing any assistance. Verbalized understanding.    Problem: Safety  Goal: Will remain free from falls  Bed in lowest position, call light in place,  at bedside, hourly rounding in place, will continue to monitor.

## 2018-04-12 ENCOUNTER — APPOINTMENT (OUTPATIENT)
Dept: PULMONOLOGY | Facility: HOSPICE | Age: 48
End: 2018-04-12
Payer: MEDICARE

## 2018-04-12 LAB
ALBUMIN SERPL BCP-MCNC: 3.3 G/DL (ref 3.2–4.9)
ALBUMIN/GLOB SERPL: 1.3 G/DL
ALP SERPL-CCNC: 120 U/L (ref 30–99)
ALT SERPL-CCNC: 34 U/L (ref 2–50)
ANION GAP SERPL CALC-SCNC: 7 MMOL/L (ref 0–11.9)
AST SERPL-CCNC: 17 U/L (ref 12–45)
BASOPHILS # BLD AUTO: 1.1 % (ref 0–1.8)
BASOPHILS # BLD: 0.08 K/UL (ref 0–0.12)
BILIRUB CONJ SERPL-MCNC: <0.1 MG/DL (ref 0.1–0.5)
BILIRUB INDIRECT SERPL-MCNC: NORMAL MG/DL (ref 0–1)
BILIRUB SERPL-MCNC: 0.3 MG/DL (ref 0.1–1.5)
BUN SERPL-MCNC: 16 MG/DL (ref 8–22)
CALCIUM SERPL-MCNC: 9.5 MG/DL (ref 8.5–10.5)
CHLORIDE SERPL-SCNC: 106 MMOL/L (ref 96–112)
CO2 SERPL-SCNC: 26 MMOL/L (ref 20–33)
CREAT SERPL-MCNC: 0.66 MG/DL (ref 0.5–1.4)
CRP SERPL HS-MCNC: 0.41 MG/DL (ref 0–0.75)
EOSINOPHIL # BLD AUTO: 0.12 K/UL (ref 0–0.51)
EOSINOPHIL NFR BLD: 1.7 % (ref 0–6.9)
ERYTHROCYTE [DISTWIDTH] IN BLOOD BY AUTOMATED COUNT: 45 FL (ref 35.9–50)
ERYTHROCYTE [SEDIMENTATION RATE] IN BLOOD BY WESTERGREN METHOD: 19 MM/HOUR (ref 0–20)
GLOBULIN SER CALC-MCNC: 2.5 G/DL (ref 1.9–3.5)
GLUCOSE SERPL-MCNC: 100 MG/DL (ref 65–99)
HBV CORE AB SERPL QL IA: NEGATIVE
HBV SURFACE AG SER QL: NEGATIVE
HCT VFR BLD AUTO: 39 % (ref 37–47)
HCV AB SER QL: NEGATIVE
HGB BLD-MCNC: 13.4 G/DL (ref 12–16)
IMM GRANULOCYTES # BLD AUTO: 0.03 K/UL (ref 0–0.11)
IMM GRANULOCYTES NFR BLD AUTO: 0.4 % (ref 0–0.9)
LYMPHOCYTES # BLD AUTO: 2 K/UL (ref 1–4.8)
LYMPHOCYTES NFR BLD: 27.7 % (ref 22–41)
MCH RBC QN AUTO: 33 PG (ref 27–33)
MCHC RBC AUTO-ENTMCNC: 34.4 G/DL (ref 33.6–35)
MCV RBC AUTO: 96.1 FL (ref 81.4–97.8)
MONOCYTES # BLD AUTO: 0.4 K/UL (ref 0–0.85)
MONOCYTES NFR BLD AUTO: 5.5 % (ref 0–13.4)
NEUTROPHILS # BLD AUTO: 4.59 K/UL (ref 2–7.15)
NEUTROPHILS NFR BLD: 63.6 % (ref 44–72)
NRBC # BLD AUTO: 0 K/UL
NRBC BLD-RTO: 0 /100 WBC
PLATELET # BLD AUTO: 373 K/UL (ref 164–446)
PMV BLD AUTO: 9.7 FL (ref 9–12.9)
POTASSIUM SERPL-SCNC: 4 MMOL/L (ref 3.6–5.5)
PROT SERPL-MCNC: 5.8 G/DL (ref 6–8.2)
RBC # BLD AUTO: 4.06 M/UL (ref 4.2–5.4)
SODIUM SERPL-SCNC: 139 MMOL/L (ref 135–145)
WBC # BLD AUTO: 7.2 K/UL (ref 4.8–10.8)

## 2018-04-12 PROCEDURE — 700102 HCHG RX REV CODE 250 W/ 637 OVERRIDE(OP): Performed by: PSYCHIATRY & NEUROLOGY

## 2018-04-12 PROCEDURE — 85025 COMPLETE CBC W/AUTO DIFF WBC: CPT

## 2018-04-12 PROCEDURE — 86803 HEPATITIS C AB TEST: CPT

## 2018-04-12 PROCEDURE — 83516 IMMUNOASSAY NONANTIBODY: CPT

## 2018-04-12 PROCEDURE — 87340 HEPATITIS B SURFACE AG IA: CPT

## 2018-04-12 PROCEDURE — 80053 COMPREHEN METABOLIC PANEL: CPT

## 2018-04-12 PROCEDURE — 86140 C-REACTIVE PROTEIN: CPT

## 2018-04-12 PROCEDURE — 82248 BILIRUBIN DIRECT: CPT

## 2018-04-12 PROCEDURE — 86255 FLUORESCENT ANTIBODY SCREEN: CPT

## 2018-04-12 PROCEDURE — 85652 RBC SED RATE AUTOMATED: CPT

## 2018-04-12 PROCEDURE — 700111 HCHG RX REV CODE 636 W/ 250 OVERRIDE (IP): Performed by: PSYCHIATRY & NEUROLOGY

## 2018-04-12 PROCEDURE — 770020 HCHG ROOM/CARE - TELE (206)

## 2018-04-12 PROCEDURE — A9270 NON-COVERED ITEM OR SERVICE: HCPCS | Performed by: PSYCHIATRY & NEUROLOGY

## 2018-04-12 PROCEDURE — 86704 HEP B CORE ANTIBODY TOTAL: CPT

## 2018-04-12 PROCEDURE — 95951 HCHG EEG-VIDEO-24HR: CPT

## 2018-04-12 RX ADMIN — OMEPRAZOLE 20 MG: 20 CAPSULE, DELAYED RELEASE ORAL at 08:23

## 2018-04-12 RX ADMIN — ASPIRIN 81 MG: 81 TABLET, CHEWABLE ORAL at 08:23

## 2018-04-12 RX ADMIN — OXYCODONE HYDROCHLORIDE 10 MG: 10 TABLET ORAL at 03:07

## 2018-04-12 RX ADMIN — GABAPENTIN 600 MG: 300 CAPSULE ORAL at 20:42

## 2018-04-12 RX ADMIN — GABAPENTIN 600 MG: 300 CAPSULE ORAL at 08:23

## 2018-04-12 RX ADMIN — DIPHENHYDRAMINE HYDROCHLORIDE 50 MG: 50 INJECTION, SOLUTION INTRAMUSCULAR; INTRAVENOUS at 17:01

## 2018-04-12 RX ADMIN — RISPERIDONE 1 MG: 0.5 TABLET ORAL at 20:42

## 2018-04-12 RX ADMIN — DIPHENHYDRAMINE HYDROCHLORIDE 50 MG: 50 INJECTION, SOLUTION INTRAMUSCULAR; INTRAVENOUS at 23:03

## 2018-04-12 RX ADMIN — DIPHENHYDRAMINE HYDROCHLORIDE 50 MG: 50 INJECTION, SOLUTION INTRAMUSCULAR; INTRAVENOUS at 04:20

## 2018-04-12 RX ADMIN — DIPHENHYDRAMINE HYDROCHLORIDE 50 MG: 50 INJECTION, SOLUTION INTRAMUSCULAR; INTRAVENOUS at 10:47

## 2018-04-12 RX ADMIN — OXYCODONE HYDROCHLORIDE 10 MG: 10 TABLET ORAL at 18:30

## 2018-04-12 RX ADMIN — OXYCODONE HYDROCHLORIDE 10 MG: 10 TABLET ORAL at 23:03

## 2018-04-12 RX ADMIN — OXYCODONE HYDROCHLORIDE 10 MG: 10 TABLET ORAL at 12:25

## 2018-04-12 RX ADMIN — DULOXETINE HYDROCHLORIDE 60 MG: 60 CAPSULE, DELAYED RELEASE ORAL at 08:23

## 2018-04-12 RX ADMIN — OXYCODONE HYDROCHLORIDE 10 MG: 10 TABLET ORAL at 08:23

## 2018-04-12 RX ADMIN — DULOXETINE HYDROCHLORIDE 60 MG: 60 CAPSULE, DELAYED RELEASE ORAL at 20:42

## 2018-04-12 RX ADMIN — GABAPENTIN 600 MG: 300 CAPSULE ORAL at 15:26

## 2018-04-12 RX ADMIN — RISPERIDONE 1 MG: 0.5 TABLET ORAL at 08:23

## 2018-04-12 ASSESSMENT — PAIN SCALES - GENERAL
PAINLEVEL_OUTOF10: 8
PAINLEVEL_OUTOF10: 6
PAINLEVEL_OUTOF10: 8
PAINLEVEL_OUTOF10: 3
PAINLEVEL_OUTOF10: 6
PAINLEVEL_OUTOF10: 7
PAINLEVEL_OUTOF10: 9
PAINLEVEL_OUTOF10: 8

## 2018-04-12 ASSESSMENT — PATIENT HEALTH QUESTIONNAIRE - PHQ9
2. FEELING DOWN, DEPRESSED, IRRITABLE, OR HOPELESS: NOT AT ALL
SUM OF ALL RESPONSES TO PHQ9 QUESTIONS 1 AND 2: 0
1. LITTLE INTEREST OR PLEASURE IN DOING THINGS: NOT AT ALL

## 2018-04-12 ASSESSMENT — ENCOUNTER SYMPTOMS
SHORTNESS OF BREATH: 0
DIZZINESS: 1
FEVER: 0
BLOOD IN STOOL: 1
EYE REDNESS: 0
HEADACHES: 1

## 2018-04-12 ASSESSMENT — LIFESTYLE VARIABLES: SUBSTANCE_ABUSE: 0

## 2018-04-12 NOTE — PROGRESS NOTES
Assumed pt care at 1900.  Received report from day RN.  Assessment completed.  Pt AOx4.  Pt comlaints of pain at this time in neck, head 9/10, continues with pain management as ordered.  No other s/s of discomfort or distress. Pt ambulating . Skin intact. Bed in lowest position, locked, and bed alarm on.  Pt calls appropriately.  Treaded socks in place, call light within reach and staff numbers provided.  Pt needs met at this time.

## 2018-04-12 NOTE — PROGRESS NOTES
IMPRESSION:        1. Intractable Headache since age of 13 YO  post Botox (Uma Reece Pain Specialist--- Botox, occipital nerve block, OxyContin)--  2. Intractable spells-- started with eyelid twitching since 2009  3. Status Post occipital nerve stimulator 2003 in the [place]--status post 6 revisions-- in [place]  4. Arthritis? Seeing specialist here and [place]( was referred to [place] by Rheumatologist here)  5. Status post  shunt 2017 (under the impression of pseudotumor cerebri). Lumbar shunt in [place] for the pseudotumor cerebri--- complicated with infection  6. Hx of hepatic dysfunction     PLAN/RECOMMENDATIONS:        The patient went through many procedures related with headache-- including lumbar shunt,  shunt and occipital nerve stimulator( not MRI compatible)       The patient is interested to replace the occipital stimulator-- which is not MRI compatible (mahcine ahs to be removed) and EEG compatible ( machine has to be turned off)     The patient is here for spells--- one spell recorded in the hospital was psychogenic, however, the patient's routine EEG is not normal either    With shunt in the brain, we are not surprised that the patient's EEG is not normal either  The patient could have both psychogenic and real epileptic seizures        Continue video EEG    Physical Exam   Constitutional: She is oriented to person, place, and time and well-developed, well-nourished, and in no distress.   HENT:   Head: Normocephalic.   Pulmonary/Chest: Effort normal.   Abdominal: Soft.   Musculoskeletal: Normal range of motion.   Neurological: She is alert and oriented to person, place, and time.   Skin: Skin is warm.         Vitals:    04/12/18 0000 04/12/18 0400 04/12/18 0800 04/12/18 1200   BP: (!) 98/54 (!) 95/52 104/65 124/75   Pulse: 97 95 89 85   Resp: 16 16 18 18   Temp: 36.2 °C (97.1 °F) 36.2 °C (97.1 °F) 37.2 °C (98.9 °F) 36 °C (96.8 °F)   SpO2: 92% 94% 96% 99%   Weight:         Review of  Systems   Constitutional: Negative for fever.   Eyes: Negative for redness.   Respiratory: Negative for shortness of breath.    Gastrointestinal: Positive for blood in stool.   Genitourinary: Negative for hematuria.   Musculoskeletal: Positive for joint pain.   Neurological: Positive for dizziness and headaches.   Psychiatric/Behavioral: Negative for substance abuse.

## 2018-04-12 NOTE — WOUND TEAM
Wound consult placed for evaluation of dorsum left foot wound.  Patient is very knowledgeable and provides self care per [facility] wound clinic.  She reports that etiology is unclear despite biopsies and FU at [facility].  Her wound care is an every other day dressing change of xeroform and foam dressing.  She removed dressing and reports wound improving and has history of STSG.  Wound is ~0.1-0.2cm round and scarred jonathon wound skin.  Supplies given to patient for her to continue care.  No involvement by wound team at this time.

## 2018-04-13 ENCOUNTER — TELEPHONE (OUTPATIENT)
Dept: RHEUMATOLOGY | Facility: PHYSICIAN GROUP | Age: 48
End: 2018-04-13

## 2018-04-13 VITALS
SYSTOLIC BLOOD PRESSURE: 112 MMHG | OXYGEN SATURATION: 96 % | DIASTOLIC BLOOD PRESSURE: 64 MMHG | BODY MASS INDEX: 33.23 KG/M2 | HEART RATE: 86 BPM | TEMPERATURE: 96.7 F | WEIGHT: 181.66 LBS | RESPIRATION RATE: 18 BRPM

## 2018-04-13 DIAGNOSIS — Z79.899 ENCOUNTER FOR LONG-TERM (CURRENT) USE OF HIGH-RISK MEDICATION: ICD-10-CM

## 2018-04-13 LAB
MITOCHONDRIA M2 IGG SER-ACNC: 8.5 UNITS (ref 0–20)
SMA IGG SER-ACNC: 6 UNITS (ref 0–19)

## 2018-04-13 PROCEDURE — 95951 HCHG EEG-VIDEO-24HR: CPT | Mod: 52

## 2018-04-13 PROCEDURE — 700102 HCHG RX REV CODE 250 W/ 637 OVERRIDE(OP): Performed by: PSYCHIATRY & NEUROLOGY

## 2018-04-13 PROCEDURE — A9270 NON-COVERED ITEM OR SERVICE: HCPCS | Performed by: PSYCHIATRY & NEUROLOGY

## 2018-04-13 PROCEDURE — 700111 HCHG RX REV CODE 636 W/ 250 OVERRIDE (IP): Performed by: PSYCHIATRY & NEUROLOGY

## 2018-04-13 RX ADMIN — HEPARIN 500 UNITS: 100 SYRINGE at 12:52

## 2018-04-13 RX ADMIN — DULOXETINE HYDROCHLORIDE 60 MG: 60 CAPSULE, DELAYED RELEASE ORAL at 08:33

## 2018-04-13 RX ADMIN — OXYCODONE HYDROCHLORIDE 10 MG: 10 TABLET ORAL at 12:56

## 2018-04-13 RX ADMIN — DIPHENHYDRAMINE HYDROCHLORIDE 50 MG: 50 INJECTION, SOLUTION INTRAMUSCULAR; INTRAVENOUS at 06:06

## 2018-04-13 RX ADMIN — OMEPRAZOLE 20 MG: 20 CAPSULE, DELAYED RELEASE ORAL at 08:33

## 2018-04-13 RX ADMIN — GABAPENTIN 600 MG: 300 CAPSULE ORAL at 08:32

## 2018-04-13 RX ADMIN — OXYCODONE HYDROCHLORIDE 10 MG: 10 TABLET ORAL at 08:33

## 2018-04-13 RX ADMIN — ASPIRIN 81 MG: 81 TABLET, CHEWABLE ORAL at 08:33

## 2018-04-13 RX ADMIN — OXYCODONE HYDROCHLORIDE 10 MG: 10 TABLET ORAL at 04:08

## 2018-04-13 RX ADMIN — RISPERIDONE 1 MG: 0.5 TABLET ORAL at 08:33

## 2018-04-13 RX ADMIN — DIPHENHYDRAMINE HYDROCHLORIDE 50 MG: 50 INJECTION, SOLUTION INTRAMUSCULAR; INTRAVENOUS at 11:51

## 2018-04-13 ASSESSMENT — PAIN SCALES - GENERAL: PAINLEVEL_OUTOF10: 8

## 2018-04-13 ASSESSMENT — PATIENT HEALTH QUESTIONNAIRE - PHQ9
1. LITTLE INTEREST OR PLEASURE IN DOING THINGS: NOT AT ALL
2. FEELING DOWN, DEPRESSED, IRRITABLE, OR HOPELESS: NOT AT ALL
SUM OF ALL RESPONSES TO PHQ9 QUESTIONS 1 AND 2: 0

## 2018-04-13 NOTE — DISCHARGE SUMMARY
DISCHARGE DIAGNOSIS    1. Intractable Headache since age of 15 YO  post Botox (Uma Reece Pain Specialist--- Botox, occipital nerve block, OxyContin)--  2. Intractable spells-- started with eyelid twitching since 2009  3. Status Post occipital nerve stimulator 2003 in the [place]--status post 6 revisions-- in [place]  4. Arthritis? Seeing specialist in [place]( was referred to [place] by Rheumatologist here)  5. Status post  shunt 2017 (under the impression of pseudotumor cerebri). Lumbar shunt in [place] for the pseudotumor cerebri--- complicated with infection    MANAGEMENT      Hospital Course    video EEG was successfully provided  Patient did not have prolonged passing out spells recorded though    Will resume home mediciation  There were no complication during this video EEG  Patient remained cooperative during this video EEG    Follow up with me in [facility] neuroscience clinic with me in 2 months      Follow up with me in 2 months ( my assistant will try to contact them also)  Also call us after the occipital nerve stimulator removed since we could offer MRI of brain on the phone      INTERPRETATION:           ________________________________________________________________________     This intensive video scalp EEG  from 4/9/2018 to 4/13/2018 is consistent with                  1. Focal nonspecific cortical dysfunction over right posterior derivations. Mild degree        There are no epileptiform discharges and no electrographic seizures in this record     ________________________________________________________________________

## 2018-04-13 NOTE — DISCHARGE INSTRUCTIONS
Discharge Instructions    Discharged to home by car with relative. Discharged via wheelchair, hospital escort: Yes.  Special equipment needed: Not Applicable    Be sure to schedule a follow-up appointment with your primary care doctor or any specialists as instructed.     Discharge Plan:   Influenza Vaccine Indication: Not indicated: Previously immunized this influenza season and > 8 years of age    I understand that a diet low in cholesterol, fat, and sodium is recommended for good health. Unless I have been given specific instructions below for another diet, I accept this instruction as my diet prescription.   Other diet: resume regular diet   DISCHARGE PATIENT Start: 04/13/18 0812, End: 04/13/18 0812, ONCE, ROUTINE     Comments: Follow up with me in 2 months ( my assistant will try to contact them also)   Also call us after the occipital nerve stimulator removed            Special Instructions: None    · Is patient discharged on Warfarin / Coumadin?   No     Depression / Suicide Risk    As you are discharged from this [facility] facility, it is important to learn how to keep safe from harming yourself.    Recognize the warning signs:  · Abrupt changes in personality, positive or negative- including increase in energy   · Giving away possessions  · Change in eating patterns- significant weight changes-  positive or negative  · Change in sleeping patterns- unable to sleep or sleeping all the time   · Unwillingness or inability to communicate  · Depression  · Unusual sadness, discouragement and loneliness  · Talk of wanting to die  · Neglect of personal appearance   · Rebelliousness- reckless behavior  · Withdrawal from people/activities they love  · Confusion- inability to concentrate      DISCHARGE PATIENT Start: 04/13/18 0812, End: 04/13/18 0812, ONCE, ROUTINE     Comments: Follow up with me in 2 months ( my assistant will try to contact them also)   Also call us after the occipital nerve stimulator removed           If you or a loved one observes any of these behaviors or has concerns about self-harm, here's what you can do:  · Talk about it- your feelings and reasons for harming yourself  · Remove any means that you might use to hurt yourself (examples: pills, rope, extension cords, firearm)  · Get professional help from the community (Mental Health, Substance Abuse, psychological counseling)  · Do not be alone:Call your Safe Contact- someone whom you trust who will be there for you.  · Call your local CRISIS HOTLINE 908-9078 or 234-578-7302  · Call your local Children's Mobile Crisis Response Team [place] (362) 832-6539 or www.[employer]  · Call the toll free National Suicide Prevention Hotlines   · National Suicide Prevention Lifeline 393-122-YKFE (4626)  · National Hope Line Network 800-SUICIDE (054-4704)

## 2018-04-13 NOTE — CARE PLAN
Problem: Communication  Goal: The ability to communicate needs accurately and effectively will improve  Outcome: PROGRESSING AS EXPECTED  Pt communicates needs effectively.   Call light w/in reach. Hourly rounding in place.     Problem: Knowledge Deficit  Goal: Knowledge of disease process/condition, treatment plan, diagnostic tests, and medications will improve  Outcome: PROGRESSING AS EXPECTED  Pt updated on POC

## 2018-04-13 NOTE — PROGRESS NOTES
Pt is A&Ox4. Denies any n/v or n/t. Pt states headache of 8/10. Medicated appropriately per MAR w/positive results. Pt updated on POC. All needs met at this time. Pt is instructed to call when in need of assistance. Bed in lowest and locked position. Call light within reach. Bed alarm and hourly rounding in place.     No seizure activity noted throughout shift.

## 2018-04-13 NOTE — TELEPHONE ENCOUNTER
Pt had labs done yesterday and we have the [facility] report she was to start plaquenil. What is the next step please advice

## 2018-04-16 RX ORDER — HYDROXYCHLOROQUINE SULFATE 200 MG/1
300 TABLET, FILM COATED ORAL DAILY
Qty: 45 TAB | Refills: 1 | Status: SHIPPED | OUTPATIENT
Start: 2018-04-16 | End: 2018-09-10 | Stop reason: SDUPTHER

## 2018-04-16 NOTE — TELEPHONE ENCOUNTER
We can start the patient back again on plaquenil.  Will start patient at 300 mg po q day plaquenil.  She needs to repeat labs in 6 weeks after starting therapy

## 2018-04-17 NOTE — EEG PROGRESS NOTE
5 DAYS OF INTENSIVE VIDEO ELECTROENCEPHALOGRAM REPORT      This Video EEG was done from 4/9/2018 to 4/13/2018      INDICATION:     1. Intractable Headache since age of 15 YO  post Botox ([facility] Specialist--- Botox, occipital nerve block, OxyContin)--  2. Intractable spells-- started with eyelid twitching since 2009  3. Status Post occipital nerve stimulator 2003 in the [place]--status post 6 revisions-- in [place]  4. Arthritis? Seeing specialist in [place]( was referred to [place] by Rheumatologist here)  5. Status post  shunt 2017 (under the impression of pseudotumor cerebri). Lumbar shunt in [place] for the pseudotumor cerebri--- complicated with infection    TECHNIQUE:  Routine electroencephalogram (EEG) was performed in accordance with the international 10-20 system. The study was reviewed in bipolar and referential montages. The recording examined the patient during wakeful and drowsy state(s).     DESCRIPTION OF THE RECORD:      On 4/9/2018 Background rhythm during awake stage shows well-organized, well-developed, average voltage 10 to 11 hertz alpha activity in the posterior regions. There are some bursts of low ampltidue theta/delta more over right.   The background waves blocks with eye opening and it is bilaterally synchronous and symmetrical.  No spike-and-wave discharges or any lateralizing abnormalities are seen.  Photic stimulation did not produce any abnormalities. Hyperventilation did not produce any abnormalities.  Stage II sleep was achieved.    On 4/10/2018 Background rhythm during awake stage shows well-organized, well-developed, average voltage 10 to 11 hertz alpha activity in the posterior regions. There are few bursts of low ampltidue theta/delta more over right.   The background waves blocks with eye opening and it is bilaterally synchronous and symmetrical.  At times, there are more lambda over right. No spike-and-wave discharges or any lateralizing abnormalities are  seen.  Stage II sleep was achieved.    On 4/11/2018 Background rhythm during awake stage shows well-organized, well-developed, average voltage 10 to 11 hertz alpha activity in the posterior regions. There are bursts of low ampltidue theta/delta more over right.   The background waves blocks with eye opening and it is bilaterally synchronous and symmetrical.  There are more lambda over right. No spike-and-wave discharges or any lateralizing abnormalities are seen.  Stage II sleep was achieved.    On 4/12/2018 Background rhythm during awake stage shows well-organized, well-developed, average voltage 10 to 11 hertz alpha activity in the posterior regions. There are bursts of low ampltidue theta/delta more over right.   The background waves blocks with eye opening and it is bilaterally synchronous and symmetrical.  There are more lambda over right. No spike-and-wave discharges or any lateralizing abnormalities are seen.  Stage II sleep was achieved.    On 4/13/2018 Background rhythm during awake stage shows well-organized, well-developed, average voltage 10 to 11 hertz alpha activity in the posterior regions. There are bursts of low ampltidue theta/delta more over right.   The background waves blocks with eye opening and it is bilaterally synchronous and symmetrical.  There are more lambda over right. No spike-and-wave discharges or any lateralizing abnormalities are seen.  Stage II sleep was achieved.    ACTIVATION PROCEDURES:        ICTAL AND/OR INTERICTAL FINDINGS:         EKG: sampling of the EKG recording demonstrated sinus rhythm.        INTERPRETATION:        ________________________________________________________________________    This intensive video scalp EEG  from 4/9/2018 to 4/13/2018 is consistent with      1. Focal nonspecific cortical dysfunction over right posterior derivations. Mild degree     There are no epileptiform discharges and no electrographic seizures in this  record    ________________________________________________________________________        ________________________________________________________________________      Billing documentation reflecting total daily recordings:   4/9/2018 (22 hrs and 03 mins)  4/10/2018 (24 hrs)  4/11/2018 (24 hrs)  4/12/2018 (24 hrs)  4/13/2018 (2 hrs and 29 mins)  ________________________________________________________________________            Lambda over right occipital

## 2018-04-18 NOTE — PROCEDURES
DATE OF SERVICE:  04/13/2018    This is a 5-day intensive video EEG monitoring from 04/09/2018 to 04/13/2018.      5 DAYS OF INTENSIVE VIDEO ELECTROENCEPHALOGRAM REPORT        This Video EEG was done from 4/9/2018 to 4/13/2018        INDICATION:      1. Intractable Headache since age of 13 YO  post Botox ([facility] Pain Specialist--- Botox, occipital nerve block, OxyContin)--  2. Intractable spells-- started with eyelid twitching since 2009  3. Status Post occipital nerve stimulator 2003 in the [place]--status post 6 revisions-- in [place]  4. Arthritis? Seeing specialist in [place]( was referred to [place] by Rheumatologist here)  5. Status post  shunt 2017 (under the impression of pseudotumor cerebri). Lumbar shunt in [place] for the pseudotumor cerebri--- complicated with infection     TECHNIQUE:  Routine electroencephalogram (EEG) was performed in accordance with the international 10-20 system. The study was reviewed in bipolar and referential montages. The recording examined the patient during wakeful and drowsy state(s).      DESCRIPTION OF THE RECORD:        On 4/9/2018 Background rhythm during awake stage shows well-organized, well-developed, average voltage 10 to 11 hertz alpha activity in the posterior regions. There are some bursts of low ampltidue theta/delta more over right.   The background waves blocks with eye opening and it is bilaterally synchronous and symmetrical.  No spike-and-wave discharges or any lateralizing abnormalities are seen.  Photic stimulation did not produce any abnormalities. Hyperventilation did not produce any abnormalities.  Stage II sleep was achieved.     On 4/10/2018 Background rhythm during awake stage shows well-organized, well-developed, average voltage 10 to 11 hertz alpha activity in the posterior regions. There are few bursts of low ampltidue theta/delta more over right.   The background waves blocks with eye opening and it is bilaterally synchronous and  symmetrical.  At times, there are more lambda over right. No spike-and-wave discharges or any lateralizing abnormalities are seen.  Stage II sleep was achieved.     On 4/11/2018 Background rhythm during awake stage shows well-organized, well-developed, average voltage 10 to 11 hertz alpha activity in the posterior regions. There are bursts of low ampltidue theta/delta more over right.   The background waves blocks with eye opening and it is bilaterally synchronous and symmetrical.  There are more lambda over right. No spike-and-wave discharges or any lateralizing abnormalities are seen.  Stage II sleep was achieved.     On 4/12/2018 Background rhythm during awake stage shows well-organized, well-developed, average voltage 10 to 11 hertz alpha activity in the posterior regions. There are bursts of low ampltidue theta/delta more over right.   The background waves blocks with eye opening and it is bilaterally synchronous and symmetrical.  There are more lambda over right. No spike-and-wave discharges or any lateralizing abnormalities are seen.  Stage II sleep was achieved.     On 4/13/2018 Background rhythm during awake stage shows well-organized, well-developed, average voltage 10 to 11 hertz alpha activity in the posterior regions. There are bursts of low ampltidue theta/delta more over right.   The background waves blocks with eye opening and it is bilaterally synchronous and symmetrical.  There are more lambda over right. No spike-and-wave discharges or any lateralizing abnormalities are seen.  Stage II sleep was achieved.     ACTIVATION PROCEDURES:          ICTAL AND/OR INTERICTAL FINDINGS:          EKG: sampling of the EKG recording demonstrated sinus rhythm.          INTERPRETATION:           ________________________________________________________________________     This intensive video scalp EEG  from 4/9/2018 to 4/13/2018 is consistent with                  1. Focal nonspecific cortical dysfunction over  right posterior derivations. Mild degree        There are no epileptiform discharges and no electrographic seizures in this record     ________________________________________________________________________           ________________________________________________________________________        Billing documentation reflecting total daily recordings:   4/9/2018 (22 hrs and 03 mins)  4/10/2018 (24 hrs)  4/11/2018 (24 hrs)  4/12/2018 (24 hrs)  4/13/2018 (2 hrs and 29 mins)  ________________________________________________________________________              Lambda over right occipital       ____________________________________     MD MAYA FOUNTAIN / EUGENE    DD:  04/17/2018 20:05:16  DT:  04/17/2018 20:11:40    D#:  [id number]  Job#:  [id number]

## 2018-05-01 ENCOUNTER — TELEPHONE (OUTPATIENT)
Dept: RHEUMATOLOGY | Facility: PHYSICIAN GROUP | Age: 48
End: 2018-05-01

## 2018-05-01 DIAGNOSIS — M79.89 SWELLING OF RIGHT HAND: ICD-10-CM

## 2018-05-01 NOTE — TELEPHONE ENCOUNTER
According to PT you once ordered an MRI of her hand but she couldn't have it done since she had implant.    Pt states that the implant has been removed and wants the order put in again    She will have that done at [facility] per her insurance     Please advice

## 2018-05-02 ENCOUNTER — TELEPHONE (OUTPATIENT)
Dept: RHEUMATOLOGY | Facility: PHYSICIAN GROUP | Age: 48
End: 2018-05-02

## 2018-05-24 ENCOUNTER — TELEPHONE (OUTPATIENT)
Dept: NEUROLOGY | Facility: MEDICAL CENTER | Age: 48
End: 2018-05-24

## 2018-05-24 DIAGNOSIS — M79.89 SWELLING OF LEFT HAND: ICD-10-CM

## 2018-05-24 DIAGNOSIS — D49.7 PITUITARY TUMOR: ICD-10-CM

## 2018-05-24 NOTE — TELEPHONE ENCOUNTER
Enedelia Pang  P Neurology [facility]   Phone Number: [phone]             I can now get MRI but I have to have it done at [facility] as my insurance does not cover [facility].  Reminder I also have a pituitary abnormality that needs to be checked out as well.  My family doc ordered some but the MRI place said that it was the wrong order type.  He also ordered an MRA but the insurance would not approve both.  If you could send out the order or let me know what type he should order that would be great because I want to get a new unit ASAP.      Sent via [employer]    PLEASE ADVISE

## 2018-05-27 PROCEDURE — 99285 EMERGENCY DEPT VISIT HI MDM: CPT

## 2018-05-28 ENCOUNTER — APPOINTMENT (OUTPATIENT)
Dept: RADIOLOGY | Facility: MEDICAL CENTER | Age: 48
DRG: 103 | End: 2018-05-28
Attending: STUDENT IN AN ORGANIZED HEALTH CARE EDUCATION/TRAINING PROGRAM
Payer: MEDICARE

## 2018-05-28 ENCOUNTER — HOSPITAL ENCOUNTER (INPATIENT)
Facility: MEDICAL CENTER | Age: 48
LOS: 3 days | DRG: 103 | End: 2018-05-31
Attending: EMERGENCY MEDICINE | Admitting: HOSPITALIST
Payer: MEDICARE

## 2018-05-28 DIAGNOSIS — G43.919 INTRACTABLE MIGRAINE WITHOUT STATUS MIGRAINOSUS, UNSPECIFIED MIGRAINE TYPE: ICD-10-CM

## 2018-05-28 DIAGNOSIS — G43.109 COMPLICATED MIGRAINE: ICD-10-CM

## 2018-05-28 PROBLEM — E87.8 ELECTROLYTE ABNORMALITY: Status: ACTIVE | Noted: 2018-05-28

## 2018-05-28 PROBLEM — I99.9 VASCULOPATHY: Status: ACTIVE | Noted: 2018-01-25

## 2018-05-28 PROBLEM — R00.0 TACHYCARDIA: Status: ACTIVE | Noted: 2018-05-28

## 2018-05-28 PROBLEM — G93.2 PSEUDOTUMOR CEREBRI: Chronic | Status: ACTIVE | Noted: 2017-11-17

## 2018-05-28 LAB
ALBUMIN SERPL BCP-MCNC: 4.2 G/DL (ref 3.2–4.9)
ALBUMIN/GLOB SERPL: 1.8 G/DL
ALP SERPL-CCNC: 92 U/L (ref 30–99)
ALT SERPL-CCNC: 17 U/L (ref 2–50)
AMPHET UR QL SCN: NEGATIVE
ANION GAP SERPL CALC-SCNC: 10 MMOL/L (ref 0–11.9)
ANION GAP SERPL CALC-SCNC: 9 MMOL/L (ref 0–11.9)
AST SERPL-CCNC: 9 U/L (ref 12–45)
BARBITURATES UR QL SCN: NEGATIVE
BASOPHILS # BLD AUTO: 0.5 % (ref 0–1.8)
BASOPHILS # BLD: 0.04 K/UL (ref 0–0.12)
BENZODIAZ UR QL SCN: NEGATIVE
BILIRUB SERPL-MCNC: 0.3 MG/DL (ref 0.1–1.5)
BUN SERPL-MCNC: 6 MG/DL (ref 8–22)
BUN SERPL-MCNC: 8 MG/DL (ref 8–22)
BZE UR QL SCN: NEGATIVE
CALCIUM SERPL-MCNC: 8.8 MG/DL (ref 8.5–10.5)
CALCIUM SERPL-MCNC: 9.1 MG/DL (ref 8.5–10.5)
CANNABINOIDS UR QL SCN: NEGATIVE
CHLORIDE SERPL-SCNC: 104 MMOL/L (ref 96–112)
CHLORIDE SERPL-SCNC: 107 MMOL/L (ref 96–112)
CO2 SERPL-SCNC: 23 MMOL/L (ref 20–33)
CO2 SERPL-SCNC: 25 MMOL/L (ref 20–33)
CREAT SERPL-MCNC: 0.48 MG/DL (ref 0.5–1.4)
CREAT SERPL-MCNC: 0.57 MG/DL (ref 0.5–1.4)
EKG IMPRESSION: NORMAL
EOSINOPHIL # BLD AUTO: 0.02 K/UL (ref 0–0.51)
EOSINOPHIL NFR BLD: 0.2 % (ref 0–6.9)
ERYTHROCYTE [DISTWIDTH] IN BLOOD BY AUTOMATED COUNT: 43.4 FL (ref 35.9–50)
GLOBULIN SER CALC-MCNC: 2.4 G/DL (ref 1.9–3.5)
GLUCOSE SERPL-MCNC: 88 MG/DL (ref 65–99)
GLUCOSE SERPL-MCNC: 89 MG/DL (ref 65–99)
HCG UR QL: NEGATIVE
HCT VFR BLD AUTO: 39.8 % (ref 37–47)
HGB BLD-MCNC: 13.5 G/DL (ref 12–16)
IMM GRANULOCYTES # BLD AUTO: 0.04 K/UL (ref 0–0.11)
IMM GRANULOCYTES NFR BLD AUTO: 0.5 % (ref 0–0.9)
LYMPHOCYTES # BLD AUTO: 1.19 K/UL (ref 1–4.8)
LYMPHOCYTES NFR BLD: 14 % (ref 22–41)
MAGNESIUM SERPL-MCNC: 1.6 MG/DL (ref 1.5–2.5)
MCH RBC QN AUTO: 32 PG (ref 27–33)
MCHC RBC AUTO-ENTMCNC: 33.9 G/DL (ref 33.6–35)
MCV RBC AUTO: 94.3 FL (ref 81.4–97.8)
METHADONE UR QL SCN: NEGATIVE
MONOCYTES # BLD AUTO: 0.46 K/UL (ref 0–0.85)
MONOCYTES NFR BLD AUTO: 5.4 % (ref 0–13.4)
NEUTROPHILS # BLD AUTO: 6.73 K/UL (ref 2–7.15)
NEUTROPHILS NFR BLD: 79.4 % (ref 44–72)
NRBC # BLD AUTO: 0 K/UL
NRBC BLD-RTO: 0 /100 WBC
OPIATES UR QL SCN: NEGATIVE
OXYCODONE UR QL SCN: NEGATIVE
PCP UR QL SCN: NEGATIVE
PHOSPHATE SERPL-MCNC: 3.1 MG/DL (ref 2.5–4.5)
PLATELET # BLD AUTO: 352 K/UL (ref 164–446)
PMV BLD AUTO: 10 FL (ref 9–12.9)
POTASSIUM SERPL-SCNC: 3.1 MMOL/L (ref 3.6–5.5)
POTASSIUM SERPL-SCNC: 4.3 MMOL/L (ref 3.6–5.5)
PROPOXYPH UR QL SCN: NEGATIVE
PROT SERPL-MCNC: 6.6 G/DL (ref 6–8.2)
RBC # BLD AUTO: 4.22 M/UL (ref 4.2–5.4)
SODIUM SERPL-SCNC: 137 MMOL/L (ref 135–145)
SODIUM SERPL-SCNC: 141 MMOL/L (ref 135–145)
SP GR UR REFRACTOMETRY: 1
TSH SERPL DL<=0.005 MIU/L-ACNC: 4.6 UIU/ML (ref 0.38–5.33)
WBC # BLD AUTO: 8.5 K/UL (ref 4.8–10.8)

## 2018-05-28 PROCEDURE — 700102 HCHG RX REV CODE 250 W/ 637 OVERRIDE(OP): Performed by: STUDENT IN AN ORGANIZED HEALTH CARE EDUCATION/TRAINING PROGRAM

## 2018-05-28 PROCEDURE — 93005 ELECTROCARDIOGRAM TRACING: CPT | Performed by: STUDENT IN AN ORGANIZED HEALTH CARE EDUCATION/TRAINING PROGRAM

## 2018-05-28 PROCEDURE — A9270 NON-COVERED ITEM OR SERVICE: HCPCS | Performed by: STUDENT IN AN ORGANIZED HEALTH CARE EDUCATION/TRAINING PROGRAM

## 2018-05-28 PROCEDURE — 700111 HCHG RX REV CODE 636 W/ 250 OVERRIDE (IP): Performed by: STUDENT IN AN ORGANIZED HEALTH CARE EDUCATION/TRAINING PROGRAM

## 2018-05-28 PROCEDURE — 93010 ELECTROCARDIOGRAM REPORT: CPT | Performed by: INTERNAL MEDICINE

## 2018-05-28 PROCEDURE — 700105 HCHG RX REV CODE 258: Performed by: STUDENT IN AN ORGANIZED HEALTH CARE EDUCATION/TRAINING PROGRAM

## 2018-05-28 PROCEDURE — 80048 BASIC METABOLIC PNL TOTAL CA: CPT

## 2018-05-28 PROCEDURE — 80307 DRUG TEST PRSMV CHEM ANLYZR: CPT

## 2018-05-28 PROCEDURE — 71045 X-RAY EXAM CHEST 1 VIEW: CPT

## 2018-05-28 PROCEDURE — 96374 THER/PROPH/DIAG INJ IV PUSH: CPT

## 2018-05-28 PROCEDURE — 80053 COMPREHEN METABOLIC PANEL: CPT

## 2018-05-28 PROCEDURE — 74018 RADEX ABDOMEN 1 VIEW: CPT

## 2018-05-28 PROCEDURE — 81025 URINE PREGNANCY TEST: CPT

## 2018-05-28 PROCEDURE — 96361 HYDRATE IV INFUSION ADD-ON: CPT

## 2018-05-28 PROCEDURE — 700105 HCHG RX REV CODE 258: Performed by: EMERGENCY MEDICINE

## 2018-05-28 PROCEDURE — 770020 HCHG ROOM/CARE - TELE (206)

## 2018-05-28 PROCEDURE — 84100 ASSAY OF PHOSPHORUS: CPT

## 2018-05-28 PROCEDURE — 700111 HCHG RX REV CODE 636 W/ 250 OVERRIDE (IP): Performed by: EMERGENCY MEDICINE

## 2018-05-28 PROCEDURE — 83735 ASSAY OF MAGNESIUM: CPT

## 2018-05-28 PROCEDURE — 99223 1ST HOSP IP/OBS HIGH 75: CPT | Mod: GC | Performed by: HOSPITALIST

## 2018-05-28 PROCEDURE — 70250 X-RAY EXAM OF SKULL: CPT

## 2018-05-28 PROCEDURE — 84443 ASSAY THYROID STIM HORMONE: CPT

## 2018-05-28 PROCEDURE — 96375 TX/PRO/DX INJ NEW DRUG ADDON: CPT

## 2018-05-28 PROCEDURE — 85025 COMPLETE CBC W/AUTO DIFF WBC: CPT

## 2018-05-28 RX ORDER — DIPHENHYDRAMINE HCL 25 MG
50 TABLET ORAL EVERY 6 HOURS PRN
Status: DISCONTINUED | OUTPATIENT
Start: 2018-05-28 | End: 2018-05-28

## 2018-05-28 RX ORDER — LORAZEPAM 1 MG/1
0.5 TABLET ORAL EVERY 8 HOURS PRN
Status: DISCONTINUED | OUTPATIENT
Start: 2018-05-28 | End: 2018-05-31 | Stop reason: HOSPADM

## 2018-05-28 RX ORDER — MAGNESIUM SULFATE HEPTAHYDRATE 40 MG/ML
2 INJECTION, SOLUTION INTRAVENOUS ONCE
Status: COMPLETED | OUTPATIENT
Start: 2018-05-28 | End: 2018-05-28

## 2018-05-28 RX ORDER — ONDANSETRON 2 MG/ML
4 INJECTION INTRAMUSCULAR; INTRAVENOUS ONCE
Status: COMPLETED | OUTPATIENT
Start: 2018-05-28 | End: 2018-05-28

## 2018-05-28 RX ORDER — PREDNISONE 5 MG/1
5 TABLET ORAL DAILY
Status: DISCONTINUED | OUTPATIENT
Start: 2018-05-28 | End: 2018-05-31 | Stop reason: HOSPADM

## 2018-05-28 RX ORDER — DIPHENHYDRAMINE HYDROCHLORIDE 50 MG/ML
25 INJECTION INTRAMUSCULAR; INTRAVENOUS ONCE
Status: COMPLETED | OUTPATIENT
Start: 2018-05-28 | End: 2018-05-28

## 2018-05-28 RX ORDER — ONDANSETRON 4 MG/1
4 TABLET, ORALLY DISINTEGRATING ORAL EVERY 4 HOURS PRN
Status: DISCONTINUED | OUTPATIENT
Start: 2018-05-28 | End: 2018-05-31 | Stop reason: HOSPADM

## 2018-05-28 RX ORDER — AMOXICILLIN 250 MG
2 CAPSULE ORAL 2 TIMES DAILY
Status: DISCONTINUED | OUTPATIENT
Start: 2018-05-28 | End: 2018-05-31 | Stop reason: HOSPADM

## 2018-05-28 RX ORDER — LORAZEPAM 2 MG/ML
0.5 INJECTION INTRAMUSCULAR ONCE
Status: COMPLETED | OUTPATIENT
Start: 2018-05-28 | End: 2018-05-28

## 2018-05-28 RX ORDER — OXYCODONE HCL 10 MG/1
10 TABLET, FILM COATED, EXTENDED RELEASE ORAL EVERY 6 HOURS
Status: DISCONTINUED | OUTPATIENT
Start: 2018-05-28 | End: 2018-05-28

## 2018-05-28 RX ORDER — DIPHENHYDRAMINE HYDROCHLORIDE 50 MG/ML
25 INJECTION INTRAMUSCULAR; INTRAVENOUS EVERY 6 HOURS PRN
Status: DISCONTINUED | OUTPATIENT
Start: 2018-05-28 | End: 2018-05-28

## 2018-05-28 RX ORDER — KETOROLAC TROMETHAMINE 30 MG/ML
15 INJECTION, SOLUTION INTRAMUSCULAR; INTRAVENOUS EVERY 6 HOURS PRN
Status: DISCONTINUED | OUTPATIENT
Start: 2018-05-28 | End: 2018-05-31 | Stop reason: HOSPADM

## 2018-05-28 RX ORDER — GABAPENTIN 300 MG/1
600 CAPSULE ORAL 3 TIMES DAILY
Status: DISCONTINUED | OUTPATIENT
Start: 2018-05-28 | End: 2018-05-31 | Stop reason: HOSPADM

## 2018-05-28 RX ORDER — LORAZEPAM 0.5 MG/1
0.25 TABLET ORAL ONCE
Status: COMPLETED | OUTPATIENT
Start: 2018-05-28 | End: 2018-05-28

## 2018-05-28 RX ORDER — SODIUM CHLORIDE 9 MG/ML
1000 INJECTION, SOLUTION INTRAVENOUS ONCE
Status: COMPLETED | OUTPATIENT
Start: 2018-05-28 | End: 2018-05-28

## 2018-05-28 RX ORDER — KETOROLAC TROMETHAMINE 30 MG/ML
30 INJECTION, SOLUTION INTRAMUSCULAR; INTRAVENOUS ONCE
Status: COMPLETED | OUTPATIENT
Start: 2018-05-28 | End: 2018-05-28

## 2018-05-28 RX ORDER — ONDANSETRON 2 MG/ML
4 INJECTION INTRAMUSCULAR; INTRAVENOUS EVERY 4 HOURS PRN
Status: DISCONTINUED | OUTPATIENT
Start: 2018-05-28 | End: 2018-05-31 | Stop reason: HOSPADM

## 2018-05-28 RX ORDER — SODIUM CHLORIDE 9 MG/ML
1000 INJECTION, SOLUTION INTRAVENOUS CONTINUOUS
Status: DISCONTINUED | OUTPATIENT
Start: 2018-05-28 | End: 2018-05-31 | Stop reason: HOSPADM

## 2018-05-28 RX ORDER — HYDROMORPHONE HYDROCHLORIDE 2 MG/ML
1 INJECTION, SOLUTION INTRAMUSCULAR; INTRAVENOUS; SUBCUTANEOUS ONCE
Status: COMPLETED | OUTPATIENT
Start: 2018-05-28 | End: 2018-05-28

## 2018-05-28 RX ORDER — RISPERIDONE 0.5 MG/1
1 TABLET ORAL 2 TIMES DAILY
Status: DISCONTINUED | OUTPATIENT
Start: 2018-05-28 | End: 2018-05-31 | Stop reason: HOSPADM

## 2018-05-28 RX ORDER — HYDROXYCHLOROQUINE SULFATE 200 MG/1
300 TABLET, FILM COATED ORAL DAILY
Status: DISCONTINUED | OUTPATIENT
Start: 2018-05-28 | End: 2018-05-31 | Stop reason: HOSPADM

## 2018-05-28 RX ORDER — OMEPRAZOLE 20 MG/1
20 CAPSULE, DELAYED RELEASE ORAL DAILY
Status: DISCONTINUED | OUTPATIENT
Start: 2018-05-28 | End: 2018-05-31 | Stop reason: HOSPADM

## 2018-05-28 RX ORDER — POLYETHYLENE GLYCOL 3350 17 G/17G
1 POWDER, FOR SOLUTION ORAL
Status: DISCONTINUED | OUTPATIENT
Start: 2018-05-28 | End: 2018-05-31 | Stop reason: HOSPADM

## 2018-05-28 RX ORDER — BISACODYL 10 MG
10 SUPPOSITORY, RECTAL RECTAL
Status: DISCONTINUED | OUTPATIENT
Start: 2018-05-28 | End: 2018-05-31 | Stop reason: HOSPADM

## 2018-05-28 RX ORDER — MAGNESIUM SULFATE 1 G/100ML
1 INJECTION INTRAVENOUS ONCE
Status: COMPLETED | OUTPATIENT
Start: 2018-05-28 | End: 2018-05-28

## 2018-05-28 RX ORDER — SODIUM CHLORIDE 9 MG/ML
INJECTION, SOLUTION INTRAVENOUS CONTINUOUS
Status: DISCONTINUED | OUTPATIENT
Start: 2018-05-28 | End: 2018-05-28

## 2018-05-28 RX ORDER — DIPHENHYDRAMINE HYDROCHLORIDE 50 MG/ML
50 INJECTION INTRAMUSCULAR; INTRAVENOUS EVERY 6 HOURS PRN
Status: DISCONTINUED | OUTPATIENT
Start: 2018-05-28 | End: 2018-05-31 | Stop reason: HOSPADM

## 2018-05-28 RX ORDER — LORAZEPAM 1 MG/1
1 TABLET ORAL ONCE
Status: COMPLETED | OUTPATIENT
Start: 2018-05-28 | End: 2018-05-28

## 2018-05-28 RX ORDER — SODIUM CHLORIDE 9 MG/ML
1000 INJECTION, SOLUTION INTRAVENOUS CONTINUOUS
Status: DISCONTINUED | OUTPATIENT
Start: 2018-05-28 | End: 2018-05-28

## 2018-05-28 RX ORDER — KETOROLAC TROMETHAMINE 30 MG/ML
30 INJECTION, SOLUTION INTRAMUSCULAR; INTRAVENOUS EVERY 6 HOURS PRN
Status: DISCONTINUED | OUTPATIENT
Start: 2018-05-28 | End: 2018-05-28

## 2018-05-28 RX ORDER — DULOXETIN HYDROCHLORIDE 60 MG/1
60 CAPSULE, DELAYED RELEASE ORAL 2 TIMES DAILY
Status: DISCONTINUED | OUTPATIENT
Start: 2018-05-28 | End: 2018-05-31 | Stop reason: HOSPADM

## 2018-05-28 RX ORDER — ASPIRIN 81 MG/1
81 TABLET, CHEWABLE ORAL DAILY
Status: DISCONTINUED | OUTPATIENT
Start: 2018-05-28 | End: 2018-05-31 | Stop reason: HOSPADM

## 2018-05-28 RX ORDER — POTASSIUM CHLORIDE 20 MEQ/1
60 TABLET, EXTENDED RELEASE ORAL ONCE
Status: COMPLETED | OUTPATIENT
Start: 2018-05-28 | End: 2018-05-28

## 2018-05-28 RX ADMIN — KETOROLAC TROMETHAMINE 30 MG: 30 INJECTION, SOLUTION INTRAMUSCULAR at 04:40

## 2018-05-28 RX ADMIN — GABAPENTIN 600 MG: 300 CAPSULE ORAL at 06:13

## 2018-05-28 RX ADMIN — RISPERIDONE 1 MG: 1 TABLET, FILM COATED ORAL at 06:15

## 2018-05-28 RX ADMIN — MAGNESIUM SULFATE IN WATER 2 G: 40 INJECTION, SOLUTION INTRAVENOUS at 05:25

## 2018-05-28 RX ADMIN — DULOXETINE HYDROCHLORIDE 60 MG: 60 CAPSULE, DELAYED RELEASE ORAL at 19:59

## 2018-05-28 RX ADMIN — CALCIUM CARBONATE-CHOLECALCIFEROL TAB 250 MG-125 UNIT 1 TABLET: 250-125 TAB at 06:15

## 2018-05-28 RX ADMIN — STANDARDIZED SENNA CONCENTRATE AND DOCUSATE SODIUM 2 TABLET: 8.6; 5 TABLET, FILM COATED ORAL at 06:15

## 2018-05-28 RX ADMIN — DIPHENHYDRAMINE HYDROCHLORIDE 50 MG: 50 INJECTION, SOLUTION INTRAMUSCULAR; INTRAVENOUS at 20:00

## 2018-05-28 RX ADMIN — DULOXETINE HYDROCHLORIDE 60 MG: 60 CAPSULE, DELAYED RELEASE ORAL at 06:13

## 2018-05-28 RX ADMIN — HYDROMORPHONE HYDROCHLORIDE 1 MG: 2 INJECTION INTRAMUSCULAR; INTRAVENOUS; SUBCUTANEOUS at 01:14

## 2018-05-28 RX ADMIN — SODIUM CHLORIDE: 9 INJECTION, SOLUTION INTRAVENOUS at 11:29

## 2018-05-28 RX ADMIN — DIPHENHYDRAMINE HYDROCHLORIDE 25 MG: 50 INJECTION INTRAMUSCULAR; INTRAVENOUS at 04:38

## 2018-05-28 RX ADMIN — SODIUM CHLORIDE 2000 MG: 9 INJECTION, SOLUTION INTRAVENOUS at 19:04

## 2018-05-28 RX ADMIN — POTASSIUM CHLORIDE 60 MEQ: 1500 TABLET, EXTENDED RELEASE ORAL at 03:06

## 2018-05-28 RX ADMIN — SODIUM CHLORIDE 1000 ML: 9 INJECTION, SOLUTION INTRAVENOUS at 01:14

## 2018-05-28 RX ADMIN — LORAZEPAM 0.25 MG: 0.5 TABLET ORAL at 11:31

## 2018-05-28 RX ADMIN — KETOROLAC TROMETHAMINE 15 MG: 30 INJECTION, SOLUTION INTRAMUSCULAR at 20:00

## 2018-05-28 RX ADMIN — OXYCODONE HYDROCHLORIDE 10 MG: 10 TABLET, FILM COATED, EXTENDED RELEASE ORAL at 08:52

## 2018-05-28 RX ADMIN — PREDNISONE 5 MG: 5 TABLET ORAL at 06:15

## 2018-05-28 RX ADMIN — HYDROXYCHLOROQUINE SULFATE 300 MG: 200 TABLET, FILM COATED ORAL at 06:14

## 2018-05-28 RX ADMIN — LORAZEPAM 1 MG: 1 TABLET ORAL at 17:09

## 2018-05-28 RX ADMIN — ONDANSETRON 4 MG: 2 INJECTION INTRAMUSCULAR; INTRAVENOUS at 01:14

## 2018-05-28 RX ADMIN — SODIUM CHLORIDE 1000 ML: 9 INJECTION, SOLUTION INTRAVENOUS at 13:27

## 2018-05-28 RX ADMIN — OMEPRAZOLE 20 MG: 20 CAPSULE, DELAYED RELEASE ORAL at 06:14

## 2018-05-28 RX ADMIN — LORAZEPAM 0.5 MG: 2 INJECTION INTRAMUSCULAR; INTRAVENOUS at 02:40

## 2018-05-28 RX ADMIN — SODIUM CHLORIDE: 9 INJECTION, SOLUTION INTRAVENOUS at 04:54

## 2018-05-28 RX ADMIN — MAGNESIUM SULFATE IN DEXTROSE 1 G: 10 INJECTION, SOLUTION INTRAVENOUS at 14:50

## 2018-05-28 RX ADMIN — GABAPENTIN 600 MG: 300 CAPSULE ORAL at 14:50

## 2018-05-28 RX ADMIN — ASPIRIN 81 MG: 81 TABLET, CHEWABLE ORAL at 06:13

## 2018-05-28 RX ADMIN — SODIUM CHLORIDE 1000 ML: 9 INJECTION, SOLUTION INTRAVENOUS at 22:58

## 2018-05-28 RX ADMIN — DIPHENHYDRAMINE HYDROCHLORIDE 50 MG: 50 INJECTION, SOLUTION INTRAMUSCULAR; INTRAVENOUS at 12:35

## 2018-05-28 RX ADMIN — RISPERIDONE 1 MG: 1 TABLET, FILM COATED ORAL at 19:59

## 2018-05-28 RX ADMIN — GABAPENTIN 600 MG: 300 CAPSULE ORAL at 19:59

## 2018-05-28 RX ADMIN — KETOROLAC TROMETHAMINE 15 MG: 30 INJECTION, SOLUTION INTRAMUSCULAR at 11:43

## 2018-05-28 ASSESSMENT — PAIN SCALES - GENERAL
PAINLEVEL_OUTOF10: 8
PAINLEVEL_OUTOF10: 7
PAINLEVEL_OUTOF10: 10
PAINLEVEL_OUTOF10: 9
PAINLEVEL_OUTOF10: 5
PAINLEVEL_OUTOF10: 9

## 2018-05-28 ASSESSMENT — ENCOUNTER SYMPTOMS
FALLS: 0
LOSS OF CONSCIOUSNESS: 0
EYE DISCHARGE: 0
DIARRHEA: 0
VOMITING: 0
HEARTBURN: 0
SEIZURES: 0
DEPRESSION: 0
HEADACHES: 1
DOUBLE VISION: 0
NECK PAIN: 0
NERVOUS/ANXIOUS: 1
SENSORY CHANGE: 0
SENSORY CHANGE: 1
BRUISES/BLEEDS EASILY: 0
BLOOD IN STOOL: 0
ABDOMINAL PAIN: 0
WEAKNESS: 1
BACK PAIN: 0
NAUSEA: 0
SEIZURES: 1
SPUTUM PRODUCTION: 0
COUGH: 0
EYE REDNESS: 0
TINGLING: 0
CONSTIPATION: 0
BLURRED VISION: 1
NAUSEA: 1
MYALGIAS: 0
CHILLS: 0
EYE PAIN: 0
SINUS PAIN: 0
PALPITATIONS: 0
SORE THROAT: 0
FEVER: 0
DIAPHORESIS: 0
TINGLING: 1
SPEECH CHANGE: 0
MYALGIAS: 1
DIZZINESS: 0
PHOTOPHOBIA: 1
FOCAL WEAKNESS: 1
TREMORS: 0
SHORTNESS OF BREATH: 0
NECK PAIN: 1
DEPRESSION: 1
POLYDIPSIA: 0
BACK PAIN: 1

## 2018-05-28 ASSESSMENT — PATIENT HEALTH QUESTIONNAIRE - PHQ9
8. MOVING OR SPEAKING SO SLOWLY THAT OTHER PEOPLE COULD HAVE NOTICED. OR THE OPPOSITE, BEING SO FIGETY OR RESTLESS THAT YOU HAVE BEEN MOVING AROUND A LOT MORE THAN USUAL: SEVERAL DAYS
4. FEELING TIRED OR HAVING LITTLE ENERGY: MORE THAN HALF THE DAYS
7. TROUBLE CONCENTRATING ON THINGS, SUCH AS READING THE NEWSPAPER OR WATCHING TELEVISION: SEVERAL DAYS
5. POOR APPETITE OR OVEREATING: MORE THAN HALF THE DAYS
9. THOUGHTS THAT YOU WOULD BE BETTER OFF DEAD, OR OF HURTING YOURSELF: NOT AT ALL
2. FEELING DOWN, DEPRESSED, IRRITABLE, OR HOPELESS: NEARLY EVERY DAY
SUM OF ALL RESPONSES TO PHQ9 QUESTIONS 1 AND 2: 6
6. FEELING BAD ABOUT YOURSELF - OR THAT YOU ARE A FAILURE OR HAVE LET YOURSELF OR YOUR FAMILY DOWN: NEARLY EVERY DAY
3. TROUBLE FALLING OR STAYING ASLEEP OR SLEEPING TOO MUCH: SEVERAL DAYS
SUM OF ALL RESPONSES TO PHQ QUESTIONS 1-9: 16
1. LITTLE INTEREST OR PLEASURE IN DOING THINGS: NEARLY EVERY DAY

## 2018-05-28 ASSESSMENT — LIFESTYLE VARIABLES
ALCOHOL_USE: NO
EVER_SMOKED: NEVER
SUBSTANCE_ABUSE: 0

## 2018-05-28 NOTE — NON-PROVIDER
"      Internal Medicine Medical Student Admitting History and Physical  Note Author: Sam Velazquez, Student    Name Enedelia Pang     1970   Age/Sex 47 y.o. female   MRN [id number]   Code Status FULL     After 5PM or if no immediate response to page, please call for cross-coverage  Attending/Team: Red/Yellow See Patient List for primary contact information  Call [phone] to page after hours   1st Call - Day Intern (R1):   Dr. Man Smith 2nd Call - Day Sr. Resident (R2/R3):   Dr. Benny Espino       Chief Complaint:  Migraine    HPI:    Ms. Enedelia Pang is a 47-year-old female who presented late last night for evaluation of migraine headache. She has a long history of severe migraines that have been managed with home Toradol IM, Benadryl IM, Botox, and an occipital nerve stimulator placed in . However, the nerve stimulator was removed a month ago, as she needed a brain MRI to evaluate for a suspected pituitary gland abnormality based on EEG findings and progressively worsening loss of peripheral vision. The MRI is scheduled for next week.    Ms. Pang states that her current migraine is different than usual. She describes her usual migraines as right-sided and throbbing in character with a visual aura. Yesterday, however, she experienced a severe (pain 10/10) left-sided migraine that had a stabbing or \"electric shock-like\" component. Her  also states that she has seemed intermittently confused. Her migraines have been occurring more frequently over the past two months. She endorses two episodes of emesis that occurred last night as well as palpitations of one day duration. In addition, she states that she has had watery diarrhea for the past two weeks, up to five episodes per day.     The patient reports a recent history of idiopathic intracranial hypertension that was treated with a  shunt in 2017. However, the shunt became infected and was subsequently removed.    This morning, Ms. " Bird states that her migraine pain is 9/10 -- slightly improved from last night. She denies any further episodes of emesis. Her palpitations have resolved. However, she remains tachycardic to 110-130 BPM. She has not had diarrhea since admission last night.  She denies any other symptoms, including fever, chills, weakness, or abdominal pain.       Review of Systems   Constitutional: Positive for malaise/fatigue. Negative for chills and fever.   HENT: Positive for tinnitus. Negative for hearing loss and sinus pain.    Eyes: Positive for blurred vision.        Endorses loss of peripheral vision   Respiratory: Negative for cough and shortness of breath.    Cardiovascular: Negative for chest pain and palpitations.   Gastrointestinal: Negative for abdominal pain, diarrhea, heartburn, nausea and vomiting.   Genitourinary: Positive for dysuria.   Musculoskeletal: Positive for back pain, joint pain, myalgias and neck pain.   Skin: Negative.    Neurological: Positive for tingling, sensory change, focal weakness, seizures, weakness and headaches.   Psychiatric/Behavioral: Positive for depression. The patient is nervous/anxious.              Past Medical History  And current medications (link outpatient medications  with diagnosis)      Pseudoseizures: 5-day video EEG performed last month showed nonspecific cortical dysfunction over right posterior derivations, but no epileptiform discharges or seizures    Idiopathic intracranial hypertension/pseudotumor cerebri:  shunt placed in [place] and removed in [place] due to infection in 2017    CVA: 2007    Rheumatoid arthritis: Plaquenil (hydroxychloroquine) 300 mg daily and prednisone 5 mg daily    Depression: Cymbalta (duloxetine) 60 mg BID and risperidone 1 mg BID      Past Surgical History:  Past Surgical History:   Procedure Laterality Date   •  SHUNT INSERTION  5/31/2017    Procedure:  SHUNT INSERTION;  Surgeon: Drew Berry M.D.;  Location: SURGERY [facility];  " Service:    • SPINAL CORD STIMULATOR  12/19/2016    Procedure: SPINAL CORD STIMULATOR FOR: PLACEMENT OF LEFT FLANK OCCIPITAL NERVE STIMULATOR BATTERY PACK;  Surgeon: Oli Oh III, M.D.;  Location: SURGERY [facility];  Service:    • LUMBAR EXPLORATION N/A 8/31/2015    Procedure: LUMBAR EXPLORATION- Lumbar shunt removal ;  Surgeon: Oli Oh III, M.D.;  Location: SURGERY [facility];  Service:    • IRRIGATION & DEBRIDEMENT NEURO N/A 6/28/2015    Procedure: IRRIGATION & DEBRIDEMENT NEURO;  Surgeon: Oli Oh III, M.D.;  Location: SURGERY [facility];  Service:    • APPENDECTOMY     • CHOLECYSTECTOMY     • OTHER      right knee surgery   • OTHER NEUROLOGICAL SURG      occipital nerve stimulator   • TONSILLECTOMY     • TUBAL LIGATION           Medication Allergy/Sensitivities:  Allergies   Allergen Reactions   • Prochlorperazine Swelling     Tongue swelling. Reaction as a teen. (compazine)  Tolerated Phenergan on 02/24/15   • Sumatriptan Unspecified     Historical=\"Heart stops.\" Reaction  between 1995 to 1997   • Vancomycin Shortness of Breath and Rash     Reaction in 2005.  D/W patient 8/31/15 - tolerated loading dose of vancomycin administered on 8/30/15 with some itching to chest with decreased infusion rate. 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  Smoking: None/never  Alcohol: Rare   Illictis: None/never  Living situation: Lives with  and four children          Physical Exam     Vitals:    05/28/18 0200 05/28/18 0230 05/28/18 0402 05/28/18 0754   BP:   150/88    Pulse: (!) 120 (!) 125 (!) 117 (!) 103   Resp:   17    Temp:   36.8 °C (98.2 °F)    SpO2: 92% 97% 96%    Weight:   77.4 kg (170 lb 10.2 oz)    Height:         Body mass index is 30.23 kg/m².  /88   Pulse (!) 103   Temp 36.8 °C (98.2 °F)   Resp 17   Ht 1.6 m (5' " "3\")   Wt 77.4 kg (170 lb 10.2 oz)   SpO2 96%   Breastfeeding? No   BMI 30.23 kg/m²   O2 therapy: Pulse Oximetry: 96 %, O2 (LPM): 0, O2 Delivery: None (Room Air)    Physical Exam   Constitutional: She is oriented to person, place, and time.   Well-developed, anxious-appearing female   HENT:   Head: Normocephalic and atraumatic.   Eyes: Conjunctivae and EOM are normal. Right eye exhibits no discharge. Left eye exhibits no discharge.   Poor pupillary constriction bilaterally   Neck: Normal range of motion.   Cardiovascular: Regular rhythm and normal heart sounds.    Tachycardic   Pulmonary/Chest: Effort normal and breath sounds normal.   Port in right side of chest wall   Abdominal: Soft. Bowel sounds are normal.   Musculoskeletal: Normal range of motion.   Neurological: She is alert and oriented to person, place, and time.   4/5 strength affecting right upper and right lower extremity. Loss of sensation affecting right side of face. Right patellar reflex 3+.   Psychiatric: Affect normal.   Anxious     Recent Labs      05/28/18   0857   WBC  8.5   RBC  4.22   HEMOGLOBIN  13.5   HEMATOCRIT  39.8   MCV  94.3   MCH  32.0   RDW  43.4   PLATELETCT  352   MPV  10.0   NEUTSPOLYS  79.40*   LYMPHOCYTES  14.00*   MONOCYTES  5.40   EOSINOPHILS  0.20   BASOPHILS  0.50     Recent Labs      05/28/18   0111  05/28/18   0857   SODIUM  137  141   POTASSIUM  3.1*  4.3   CHLORIDE  104  107   CO2  23  25   GLUCOSE  89  88   BUN  8  6*     TSH 4.600  0.380 - 5.330 uIU/mL Final         Assessment/Plan     #Migraines    The change in character, intensity, and frequency of migraines in an individual with a history of idiopathic intracranial hypertension, infected  shunt, and abnormal EEG findings is worrisome for brain tumor vs abscess vs recurrence of idiopathic intracranial hypertension. Therefore, an MRI with contrast should be ordered and neurology should be consulted regarding the necessity of a lumbar puncture.    - Order MRI with " gadolinium contrast  - Continue IM Toradol and Benadryl for pain per home regimen  - Antiemetics PRN  - Q4H neuro checks  - Neurology consult with Dr. Ribera regarding need for LP to assess for IIH    #Tachycardia    Tachycardia and palpitations may be secondary to anxiety or underlying arrhythmia. A normal EKG suggests that the former is most likely.     - Tachycardic since admission  - Reports palpitations  - EKG this morning showed sinus tachycardia with baseline wander in V4  - Possibly secondary to anxiety   - Start lorazepam 0.25 mg  - D/C tele    #Diarrhea  - Now resolved; however, hypokalemic to 3.1  - IVF, trend and replete K+    #RA  - Continue home Plaquenil and prednisone    #Depression  - Continue home Cymbalta and risperidone  - Order psychiatry consult to assess for underlying psychosis    Dispo: Inpatient for MRI and pain management

## 2018-05-28 NOTE — PROGRESS NOTES
Pt refusing 50mg of po benadryl, states [facility] MD promised her 50 IV of benadryl or 100mg orally. Paged MD per pt request, waiting for call back.

## 2018-05-28 NOTE — PROGRESS NOTES
"Pt c/o \"weird electrical signals\" happening in eyes. Pt states this sensation is slowly worsening since she has been here. Pt verbalized history of seizures, but denies this sensation from past experiences. Seizure precautions in place. Pt educated not to get out of bed without calling. Call light in reach. Bed locked in lowest position. Will continue to monitor.  "

## 2018-05-28 NOTE — PROGRESS NOTES
Internal Medicine Interval Note  Note Author: Man Smith M.D.     Name Enedelia Pang     1970   Age/Sex 47 y.o. female   MRN [id number]   Code Status FULL     After 5PM or if no immediate response to page, please call for cross-coverage  Attending/Team: Dr. Newell/ETHAN See Patient List for primary contact information  Call [phone] to page    1st Call - Day Intern (R1):   Dr. Smith 2nd Call - Day Sr. Resident (R2/R3):   Dr. Espino         Reason for interval visit  (Principal Problem)   Complicated migraine    Interval Problem Daily Status Update  (24 hours)     Patient seen this AM, initially not in any distress, then seen crying due to pain later in the AM. She states she has had constant headache on the left side for weeks, treated with IM toradol and benadryl at home, recently had botox injection last week per pt. Unclear how reliable patient's history is as she states she had her occipital nerve stimulator removed three months ago, then states it was removed in December of last year. Patient generally alert and oriented, with overall normal neurologic exam. Patient followed by Dr. Ribera. Minimal concern for stroke or seizure at this time, patient has hx of idiopathic intracranial hypertension with  shunt placed , unclear if it is still in place or removed. Will get MRI brain with contrast, and consult neurology.    Review of Systems   Constitutional: Negative for chills, diaphoresis and fever.   HENT: Negative for congestion, hearing loss, sore throat and tinnitus.    Eyes: Positive for blurred vision and photophobia. Negative for double vision, pain, discharge and redness.   Respiratory: Negative for cough, sputum production and shortness of breath.    Cardiovascular: Negative for chest pain, palpitations and leg swelling.   Gastrointestinal: Positive for nausea. Negative for abdominal pain, blood in stool, constipation, diarrhea, melena and vomiting.   Genitourinary: Negative  for dysuria and frequency.   Musculoskeletal: Positive for joint pain. Negative for back pain, falls, myalgias and neck pain.   Skin: Negative for itching and rash.   Neurological: Positive for focal weakness and headaches. Negative for dizziness, tingling, tremors, sensory change, speech change, seizures and loss of consciousness.   Endo/Heme/Allergies: Negative for polydipsia. Does not bruise/bleed easily.   Psychiatric/Behavioral: Negative for depression and substance abuse.       Consultants/Specialty  Neurology    Disposition  Inpatient    Quality Measures  Quality-Core Measures   Reviewed items::  Labs reviewed, EKG reviewed, Medications reviewed and Radiology images reviewed  Lopez catheter::  No Lopez  DVT prophylaxis - mechanical:  SCDs          Physical Exam       Vitals:    05/28/18 0402 05/28/18 0754 05/28/18 0800 05/28/18 0933   BP: 150/88  130/85    Pulse: (!) 117 (!) 103 (!) 103 99   Resp: 17  20    Temp: 36.8 °C (98.2 °F)  36.6 °C (97.9 °F)    SpO2: 96%  99% 98%   Weight: 77.4 kg (170 lb 10.2 oz)      Height:         Body mass index is 30.23 kg/m². Weight: 77.4 kg (170 lb 10.2 oz)  Oxygen Therapy:  Pulse Oximetry: 98 %, O2 (LPM): 0, O2 Delivery: None (Room Air)    Physical Exam   Constitutional: She is oriented to person, place, and time. She appears distressed.   HENT:   Head: Normocephalic and atraumatic.   Mouth/Throat: Oropharynx is clear and moist. No oropharyngeal exudate.   Eyes: Conjunctivae and EOM are normal. Right eye exhibits no discharge. Left eye exhibits no discharge. No scleral icterus.   Mild horizontal nystagmus, pupils dilated do not constrict past 3mm diameter   Neck: Normal range of motion. Neck supple.   Cardiovascular: Regular rhythm, normal heart sounds and intact distal pulses.  Exam reveals no gallop and no friction rub.    No murmur heard.  tachycardic   Pulmonary/Chest: Effort normal and breath sounds normal. No respiratory distress. She has no wheezes. She has no rales.    Abdominal: Soft. Bowel sounds are normal. She exhibits no distension. There is no tenderness.   Musculoskeletal: Normal range of motion. She exhibits no edema or tenderness.   Lymphadenopathy:     She has no cervical adenopathy.   Neurological: She is alert and oriented to person, place, and time. She has normal sensation, normal strength and intact cranial nerves.   No visual field defect, R patellor tendon mildly hyperreflexive, Babinski negative   Skin: Skin is warm and dry. No rash noted. She is not diaphoretic. No erythema.   Psychiatric: Mood and judgment normal.         Lab Data Review:         5/28/2018  11:56 AM    Recent Labs      05/28/18   0111 05/28/18   0857   SODIUM  137  141   POTASSIUM  3.1*  4.3   CHLORIDE  104  107   CO2  23  25   BUN  8  6*   CREATININE  0.57  0.48*   MAGNESIUM  1.6   --    PHOSPHORUS  3.1   --    CALCIUM  8.8  9.1       Recent Labs      05/28/18   0111 05/28/18   0857   ALTSGPT  17   --    ASTSGOT  9*   --    ALKPHOSPHAT  92   --    TBILIRUBIN  0.3   --    GLUCOSE  89  88       Recent Labs      05/28/18   0857   RBC  4.22   HEMOGLOBIN  13.5   HEMATOCRIT  39.8   PLATELETCT  352       Recent Labs      05/28/18   0111 05/28/18   0857   WBC   --   8.5   NEUTSPOLYS   --   79.40*   LYMPHOCYTES   --   14.00*   MONOCYTES   --   5.40   EOSINOPHILS   --   0.20   BASOPHILS   --   0.50   ASTSGOT  9*   --    ALTSGPT  17   --    ALKPHOSPHAT  92   --    TBILIRUBIN  0.3   --            Assessment/Plan     * Complicated migraine- (present on admission)   Assessment & Plan    - rebound symptoms of migraine after recent nerve stimulator removal? also consider idiopathic intracranial hypertension, vs less likely stroke or seizure, other encephalopathy  - MRI brain with contrast ordered; Neurology consulted, follows with Dr. Ribera  - Toradol and Benadryl IV for migraine (on IM for both at home)  - antiemetics PRN, IVF at 75 cc/hr, low dose ativan prn  - q4h neuro checks, seizure precautions         Electrolyte abnormality- (present on admission)   Assessment & Plan    - 2 weeks of diarrhea, up to 5 times a day; patient states diarrhea stopped a week ago  - Replete K and Mg  - s/p K 60 mEq, Mg 2 gm  - EKG, telemetry, trend chem, replete PRN        history of pseudotumor cerebri- (present on admission)   Assessment & Plan    - s/p  shunt 2017, unclear if still present or not  - Cranial nerve exam normal, visual fields intact  - Will obtain MRI brain with contrast, neurology consulted        Depression- (present on admission)   Assessment & Plan    - continue home Cymbalta        Tachycardia- (present on admission)   Assessment & Plan    - up to 130s in ED, appeared regular rhythm on telemetry  - Improved after IVF and pain medications  - no chest pain or dyspnea, denies cardiac history  - trend and replete electrolytes, EKG, telemetry        Rheumatoid arthritis(714.0)- (present on admission)   Assessment & Plan    - continue home Plaquenil and prednisone        Diarrhea- (present on admission)   Assessment & Plan    - per history, but did not mention as a complaint, found only on ROS; pt states later her diarrhea resolved a week ago  - IVF, trend and replete electrolytes, consider stool studies if has diarrhea this course

## 2018-05-28 NOTE — PROGRESS NOTES
Bedside report received from ER nurse. Assumed care of pt. Transferred to -9 with ACLS RN, Brianna. Pt awake, laying in bed. A/Ox4, pt is tachycardic and anxious. c/o pain 10/10 in head. POC reviewed and white board updated. Tele box on. Call light in reach. Bed locked in lowest position with 2 upper bed rails up.

## 2018-05-28 NOTE — PROGRESS NOTES
Maria Teresa GARCIA MD as per MRI protocol, the pts brain MRI shunt setting need to be scheduled/ordered to be check after MRI brain.

## 2018-05-28 NOTE — PROGRESS NOTES
[facility] team yellow paged regarding diet order and benadryl and toradol. No new orders received. Per Dr. Smith, he will place new orders once he sees the pt in rounds.

## 2018-05-28 NOTE — ED PROVIDER NOTES
"ED Provider Note    CHIEF COMPLAINT  Chief Complaint   Patient presents with   • Migraine     occipital nerve stimulator removed recently, onset yesterday, L sided, \"feels like an electrical storm in there\"    • Blurred Vision     has had blurry vision w/ migraines before   • Palpitations      in triage        HPI  Enedelia Pang is a 47 y.o. female here for evaluation of migraine headache. The patient states that she has a lengthy history of migraine headaches, all the same as this episode. However she states that she needs some additional pain control. She does have intermittent blurred vision with her headaches, and this is not any different. She previously had some form of nerve stimulator, that was removed 3 months ago. She states that she has followed up with neurology for the same, and continues to be monitored for them. She has no paresthesias, no weakness, but does complain of a fast heart rate secondary to the pain. She states that she's had to be hospitalized multiple times in the past for this as well.    PAST MEDICAL HISTORY   has a past medical history of Allergy, unspecified not elsewhere classified; Anemia (10/05/2017); Anxiety; autoimmune; Depression; H/O Clostridium difficile infection; MRSA infection; Hydrocephalus; Migraine with aura, with intractable migraine, so stated, without mention of status migrainosus; MRSA (methicillin resistant Staphylococcus aureus); Pituitary abnormality (HCC); Staph infection; and Stroke (HCC).    SOCIAL HISTORY  Social History     Social History Main Topics   • Smoking status: Never Smoker   • Smokeless tobacco: Never Used   • Alcohol use Yes      Comment: Rare   • Drug use: No   • Sexual activity: Not on file       SURGICAL HISTORY   has a past surgical history that includes cholecystectomy; tonsillectomy; appendectomy; tubal ligation; other; other neurological surg; irrigation & debridement neuro (N/A, 6/28/2015); lumbar exploration (N/A, 8/31/2015); " "spinal cord stimulator (12/19/2016); and  shunt insertion (5/31/2017).    CURRENT MEDICATIONS  Home Medications    **Home medications have not yet been reviewed for this encounter**         ALLERGIES  Allergies   Allergen Reactions   • Prochlorperazine Swelling     Tongue swelling. Reaction as a teen. (compazine)  Tolerated Phenergan on 02/24/15   • Sumatriptan Unspecified     Historical=\"Heart stops.\" Reaction  between 1995 to 1997   • Vancomycin Shortness of Breath and Rash     Reaction in 2005.  D/W patient 8/31/15 - tolerated loading dose of vancomycin administered on 8/30/15 with some itching to chest with decreased infusion rate. Red Man Syndrome       REVIEW OF SYSTEMS  See HPI for further details. Review of systems as above, otherwise all other systems are negative.     PHYSICAL EXAM  VITAL SIGNS: /92   Pulse (!) 126   Temp 36.6 °C (97.9 °F)   Resp 14   Ht 1.6 m (5' 3\")   Wt 76.8 kg (169 lb 5 oz)   SpO2 98%   BMI 29.99 kg/m²     Constitutional: Well developed, well nourished. Mild acute distress.  HEENT: Normocephalic, atraumatic. MMM  Neck: Supple, Full range of motion   Chest/Pulmonary:  No respiratory distress.  Equal expansion   Musculoskeletal: No deformity, no edema, neurovascular intact.   Cardiac; tachycardic without murmur.  Neuro: Clear speech, appropriate, cooperative, cranial nerves II-XII grossly intact. Good finger-to-nose bilaterally, dorsi plantar flexion extension intact.  Psych:  Anxious.     Results for orders placed or performed during the hospital encounter of 05/28/18   COMP METABOLIC PANEL   Result Value Ref Range    Sodium 137 135 - 145 mmol/L    Potassium 3.1 (L) 3.6 - 5.5 mmol/L    Chloride 104 96 - 112 mmol/L    Co2 23 20 - 33 mmol/L    Anion Gap 10.0 0.0 - 11.9    Glucose 89 65 - 99 mg/dL    Bun 8 8 - 22 mg/dL    Creatinine 0.57 0.50 - 1.40 mg/dL    Calcium 8.8 8.5 - 10.5 mg/dL    AST(SGOT) 9 (L) 12 - 45 U/L    ALT(SGPT) 17 2 - 50 U/L    Alkaline Phosphatase 92 30 - " 99 U/L    Total Bilirubin 0.3 0.1 - 1.5 mg/dL    Albumin 4.2 3.2 - 4.9 g/dL    Total Protein 6.6 6.0 - 8.2 g/dL    Globulin 2.4 1.9 - 3.5 g/dL    A-G Ratio 1.8 g/dL   ESTIMATED GFR   Result Value Ref Range    GFR If African American >60 >60 mL/min/1.73 m 2    GFR If Non African American >60 >60 mL/min/1.73 m 2           PROCEDURES     MEDICAL RECORD  I have reviewed patient's medical record and pertinent results are listed above.    COURSE & MEDICAL DECISION MAKING  I have reviewed any medical record information, laboratory studies and radiographic results as noted above.    2:21 AM  The patient will be admitted to the [facility] service, for continued iv fluids and pain management.  She currently has no focal deficits, and states she feels slightly better after the pain medications.       Differential diagnoses include but not limited to: migraine, sah, subdural,     FINAL IMPRESSION  1. Intractable migraine without status migrainosus, unspecified migraine type            Electronically signed by: Kannan Francis, 5/28/2018 1:42 AM

## 2018-05-28 NOTE — ASSESSMENT & PLAN NOTE
- s/p  shunt 2017, unclear if still present or not  - Cranial nerve exam normal, visual fields intact  - MRI shows no hydrocephalus   Sabiha Arias (PA-C)

## 2018-05-28 NOTE — PROGRESS NOTES
Mraia [facility], pt hysterical, states she is having vision changes blurry both eyes, states she has tingling in her right arm and legs.   Dr. Crowe returned call, states she will be at bedside.

## 2018-05-28 NOTE — SENIOR ADMIT NOTE
"HPI:  Pt is a 48 y/o female with hx of intractable migraines since age 14 s/p Botox, occipital stimulator with multiple revisions, removed 1 mth ago to get an MRI for concern of brain tumor, s/p  shunt for ? pseudotumor cerebri, CVA 2007 , anxiety presents with c/o intractable heaches which started a day ago  Head ache is left sided, electrical sensation associated with NV, vision changes.  Pt also c/o palpitations, denies CP, SOB, denies any focal deficits weakness  Pt's  who was present at bedside reports she is mildly confused.    Exam:  GEN: NAD  Eyes: dilated, nystagmus+ve  Lungs: clear  Heart: tachycardic  Psych; anxious  Neurology  AAOx3, strength normal, reflexes normal, CN normal  VS: /88   Pulse (!) 103   Temp 36.8 °C (98.2 °F)   Resp 17   Ht 1.6 m (5' 3\")   Wt 77.4 kg (170 lb 10.2 oz)   SpO2 96%   Breastfeeding? No   BMI 30.23 kg/m²       Labs: .hypokalemia  Imaging:  No orders to display         Assessment and plan:  Complex migrane  Tachycardia  Hypokalemia, hypomagnesemia  Diarrhea: none presently  RA  Depression      Plan  IV fluids  Anti emetics  repleted potassium, Mag  neurochecks q4,  Will give IV toradol and benadryl(on IM toradol and benadryl at home)  EKG ordered,f/u K  Stool studies if having diarrhea(plaquenil can cause diarrhea  contine home plaquenil and prednisone  Continue cymbata  Consider neurology consult, EEG    For further information please refer to intern H&P      "

## 2018-05-28 NOTE — PROGRESS NOTES
2 RN skin check completed with CAROLINA Reed. 1 well healed scar on posterior cervical region. 3 well healed scars on back. 1 well healed scar on right posterior hip. 3 medium brown scabs to right hand with redness and slight swelling. Generalized round scars and scabbing throughout lower and upper extremities from pt stated autoimmune disease. Scab to top of left foot, mild redness. Skin is overall dry.

## 2018-05-28 NOTE — H&P
"      Internal Medicine Admitting History and Physical    Note Author: Steve Rawls M.D.       Name Enedelia Pang     1970   Age/Sex 47 y.o. female   MRN [id number]   Code Status FULL     After 5PM or if no immediate response to page, please call for cross-coverage  Attending/Team: Reece/Alea See Patient List for primary contact information  Call [phone] to page    1st Call - Day Intern (R1):   Sarah 2nd Call - Day Sr. Resident (R2/R3):   Kenzie       Chief Complaint:  headache    HPI:  47 year old female with a history of intractable headaches/migraines since age 14 s/p botox, occipital nerve stimulator with 6 revisions and removal 1 month ago to allow brain MRI for concern of pituitary tumor, s/p  shunt  for ?pseudotumor cerebri, CVA  (due to migraine per pt and ) with residual right sided weakness and facial numbness, anxiety, depression, cdiff and MRSA infections, and multiple abdominal surgeries, presents for 1 day of intractable headache.  ([phone]) was present for the interview and provided additional history.    Patient states she began having a left sided headache, \"like an electrical storm\" and with a stabbing component. Per her  she usually gets right sided headaches, and when they are left sided they are much worse. It is associated with nausea, non-bloody emesis x2, blurred vision \"like my vision jets back and forth.\" Also notes palpitations since 6 am day of presentation, denies chest pain, dyspnea, diaphoresis. Patient notes for the past two months she has had recurrent/constant migraine headaches. She takes Toradol IM and Benadryl IM at home for migraines, but notes she since had her electrical stimulator removed the migraines have become more severe and more frequent.  notes that since the morning of admission, patient has not seemed like herself mentally, has been somewhat subtly confused and saying things occasionally in conversation " "that do not make sense or answering questions about the present with events that happened years ago, which he states has never happened in association with her migraines before.  would frequently correct the patient during the interview, and patient would then agree. Patient states she \"feels crappy\" and is aware that she is having difficulties with conversation currently.     Patient denies hearing changes, new focal weakness or sensation deficits (has chronic RUE weakness and rt facial numbness from prior CVA), fever, chills. She does note diarrhea for the past 2 weeks, up to 5 times a day and watery non-bloody, but has not seen anyone for it as she has too many doctors appointments currently to keep. Denies abdominal pain.    Patient states she is a never smoker, has rare alcohol, denies illicit drugs. 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     ROS  Constitutional: Denies weight loss, fever, chills, weakness, malaise.  Eyes: Denies double vision, positive for double vision.  ENT: Denies hearing loss, congestion, runny nose, sore throat.  Cardiovascular: Denies chest pain, positive for palpitations.  Respiratory: Denies dyspnea, productive cough.  GI: Denies nausea, vomiting, abdominal pain, positive for diarrhea.  : Denies dysuria, urinary urgency or frequency.  Musculo-skeletal: Denies muscle spasms, joint stiffness.  Skin: Denies change in skin, hair, nails.  Neurological: Denies paresthesias, fasciculations, seizures.  Psychological: Denies change in personality, depression.  All others negative          Past Medical History:   Past Medical History:   Diagnosis Date   • Allergy, unspecified not elsewhere classified    • Anemia 10/05/2017    Unsure if a current issue.   • Anxiety    • autoimmune    • Depression    • H/O Clostridium difficile infection    • Hx MRSA infection    • Hydrocephalus     with lumbar shunt   • Migraine with aura, with intractable " "migraine, so stated, without mention of status migrainosus    • MRSA (methicillin resistant Staphylococcus aureus)    • Pituitary abnormality (HCC)    • Staph infection    • Stroke (HCC)     2007       Past Surgical History:  Past Surgical History:   Procedure Laterality Date   •  SHUNT INSERTION  5/31/2017    Procedure:  SHUNT INSERTION;  Surgeon: Drew Berry M.D.;  Location: SURGERY [facility];  Service:    • SPINAL CORD STIMULATOR  12/19/2016    Procedure: SPINAL CORD STIMULATOR FOR: PLACEMENT OF LEFT FLANK OCCIPITAL NERVE STIMULATOR BATTERY PACK;  Surgeon: Oli Oh III, M.D.;  Location: SURGERY [facility];  Service:    • LUMBAR EXPLORATION N/A 8/31/2015    Procedure: LUMBAR EXPLORATION- Lumbar shunt removal ;  Surgeon: Oli Oh III, M.D.;  Location: SURGERY [facility];  Service:    • IRRIGATION & DEBRIDEMENT NEURO N/A 6/28/2015    Procedure: IRRIGATION & DEBRIDEMENT NEURO;  Surgeon: Oli Oh III, M.D.;  Location: SURGERY [facility];  Service:    • APPENDECTOMY     • CHOLECYSTECTOMY     • OTHER      right knee surgery   • OTHER NEUROLOGICAL SURG      occipital nerve stimulator   • TONSILLECTOMY     • TUBAL LIGATION         Current Outpatient Medications:  Home Medications    **Home medications have not yet been reviewed for this encounter**         Medication Allergy/Sensitivities:  Allergies   Allergen Reactions   • Prochlorperazine Swelling     Tongue swelling. Reaction as a teen. (compazine)  Tolerated Phenergan on 02/24/15   • Sumatriptan Unspecified     Historical=\"Heart stops.\" Reaction  between 1995 to 1997   • Vancomycin Shortness of Breath and Rash     Reaction in 2005.  D/W patient 8/31/15 - tolerated loading dose of vancomycin administered on 8/30/15 with some itching to chest with decreased infusion rate. Red Man Syndrome         Family History:  Family History   Problem Relation Age of Onset   • No Known Problems Mother    • No Known Problems Father  " "      Social History:  Social History     Social History   • Marital status:      Spouse name: N/A   • Number of children: N/A   • Years of education: N/A     Occupational History   •       on disability     Social History Main Topics   • Smoking status: Never Smoker   • Smokeless tobacco: Never Used   • Alcohol use Yes      Comment: Rare   • Drug use: No   • Sexual activity: Not on file     Other Topics Concern   • Not on file     Social History Narrative   • No narrative on file     Living situation: lives with   PCP : Elliott Power M.D.      Physical Exam     Vitals:    05/28/18 0030 05/28/18 0130 05/28/18 0200 05/28/18 0230   BP:       Pulse: (!) 126 (!) 120 (!) 120 (!) 125   Resp:       Temp:       SpO2: 98% 91% 92% 97%   Weight:       Height:         Body mass index is 29.99 kg/m².  /92   Pulse (!) 125   Temp 36.6 °C (97.9 °F)   Resp 14   Ht 1.6 m (5' 3\")   Wt 76.8 kg (169 lb 5 oz)   SpO2 97%   Breastfeeding? No   BMI 29.99 kg/m²   O2 therapy: Pulse Oximetry: 97 %, O2 Delivery: None (Room Air)    Physical Exam  General: Sitting quietly in hospital bed in room with lights out, no acute distress but appears concerned  Eyes: Pupils dilated equally bilaterally.  Throat: Mallampati I. No lesions or erythema noted.  Neck: Supple. No lymphadenopathy noted.   Lungs: No respiratory distress. Clear to auscultation bilaterally.  Cardiovascular: Tachycardic, regular rhythm. No rubs or gallops. 1/6 early systolic difficult to characterize murmur best heard at RSB without radiation to carotid.  Abdomen: Benign. No rebound or guarding noted.  Extremities: Bilateral 1+ pitting edema of hands not extending up arms. Trace pitting edema of bilateral lower extremities.  Skin: Round scars on dorsal hand surfaces bilaterally. Healed surgical scars on back and neck.  Neurological: Alert and oriented to person, place, and time. Dilated fixed pupils bilaterally with horizontal nystagmus. " EOMI. Visual fields may be intact but exam somewhat limited by patient's attention/concentration during exam. Right sided facial decreased sensation (patient states chronic since remote CVA). Normal facial motor. Hearing grossly intact. Palate rise normal. Trapezius normal equal strength. Tongue out without deviation. No dysarthria or dysphasia. Strength 4/5 RUE (patient states chronic since remote CVA), 5/5 LUE, 5/5 RLE and LLE. 2+ patellar reflexes bilaterally.  Psychiatry: Affect is scared and anxious, mood is normal. Executive function appears intact. Occasional nonsensical answers and citing remote past events as current events during interview (confirmed by  who was present).        Data Review       Old Records Request:   Deferred  Current Records review/summary: Completed    Lab Data Review:  Recent Results (from the past 24 hour(s))   COMP METABOLIC PANEL    Collection Time: 05/28/18  1:11 AM   Result Value Ref Range    Sodium 137 135 - 145 mmol/L    Potassium 3.1 (L) 3.6 - 5.5 mmol/L    Chloride 104 96 - 112 mmol/L    Co2 23 20 - 33 mmol/L    Anion Gap 10.0 0.0 - 11.9    Glucose 89 65 - 99 mg/dL    Bun 8 8 - 22 mg/dL    Creatinine 0.57 0.50 - 1.40 mg/dL    Calcium 8.8 8.5 - 10.5 mg/dL    AST(SGOT) 9 (L) 12 - 45 U/L    ALT(SGPT) 17 2 - 50 U/L    Alkaline Phosphatase 92 30 - 99 U/L    Total Bilirubin 0.3 0.1 - 1.5 mg/dL    Albumin 4.2 3.2 - 4.9 g/dL    Total Protein 6.6 6.0 - 8.2 g/dL    Globulin 2.4 1.9 - 3.5 g/dL    A-G Ratio 1.8 g/dL   ESTIMATED GFR    Collection Time: 05/28/18  1:11 AM   Result Value Ref Range    GFR If African American >60 >60 mL/min/1.73 m 2    GFR If Non African American >60 >60 mL/min/1.73 m 2   MAGNESIUM    Collection Time: 05/28/18  1:11 AM   Result Value Ref Range    Magnesium 1.6 1.5 - 2.5 mg/dL   PHOSPHORUS    Collection Time: 05/28/18  1:11 AM   Result Value Ref Range    Phosphorus 3.1 2.5 - 4.5 mg/dL     Records reviewed and summarized in current documentation :   Yes  [employer] teaching service handout given to patient:  Yes           Assessment/Plan     * Complicated migraine- (present on admission)   Assessment & Plan    - rebound interictal/postictal symptoms of migraine after recent nerve stimulator removal, non-epileptic or epileptic non-witnessed seizure (history of seizure d/o) with postictal state, other encephalopathy etiology  - migraines are chronic, however change in mental status new per  (but patient had nerve stimulator 2003 until 1 month ago)  - Toradol and Benadryl IV for migraine (per pt request, on IM both at home) and reassess  - antiemetics PRN, IVF  - q4h neuro checks, seizure precautions, UDS and UA  - consider EEG, Neurology consult        Tachycardia- (present on admission)   Assessment & Plan    - up to 130s in ED, appeared regular rhythm on telemetry, no EKG done in ED  - no chest pain or dyspnea, denies cardiac history  - trend and replete electrolytes, EKG, telemetry        Electrolyte abnormality- (present on admission)   Assessment & Plan    - 2 weeks of diarrhea, up to 5 times a day  - K 3.1, Mg 1.6  - HR to 130s  - s/p K 60 mEq, Mg 2 gm  - EKG, telemetry, trend chem, replete PRN        Diarrhea- (present on admission)   Assessment & Plan    - per history, but did not mention as a complaint, found only on ROS  - history notable for piror cdiff, but no current fever, leukocytosis, or abdominal pain/tenderness  - IVF, trend and replete electrolytes, consider stool studies if has diarrhea this course        Rheumatoid arthritis(714.0)- (present on admission)   Assessment & Plan    - continue home Plaquenil and prednisone        Depression- (present on admission)   Assessment & Plan    - continue home Cymbalta            Anticipated Hospital stay:  >2 midnights        Quality Measures  Quality-Core Measures   Reviewed items::  Labs reviewed and Medications reviewed  Lopez catheter::  No Lopez  DVT prophylaxis - mechanical:  SCDs  Ulcer  Prophylaxis::  Not indicated

## 2018-05-28 NOTE — ED TRIAGE NOTES
"Chief Complaint   Patient presents with   • Migraine     occipital nerve stimulator removed recently, onset yesterday, L sided, \"feels like an electrical storm in there\"    • Blurred Vision     has had blurry vision w/ migraines before   • Palpitations      in triage      Pt ambulatory to triage w/ no difficulty, Pt does not appear in distress. Pt has hx of intractable migraines, seen here on multiple occassions for same, Pt states she had her nerve stimulator removed because it was \"shocking\" her. Pt also reporting some nausea, and like diplopia states this has happened to her before w/ migraines. No focal neuro deficits noted. Pt placed back in lobby.    Blood pressure 153/92, pulse (!) 135, temperature 36.6 °C (97.9 °F), resp. rate 14, height 1.6 m (5' 3\"), weight 76.8 kg (169 lb 5 oz), SpO2 100 %.      "

## 2018-05-29 ENCOUNTER — APPOINTMENT (OUTPATIENT)
Dept: RADIOLOGY | Facility: MEDICAL CENTER | Age: 48
DRG: 103 | End: 2018-05-29
Attending: STUDENT IN AN ORGANIZED HEALTH CARE EDUCATION/TRAINING PROGRAM
Payer: MEDICARE

## 2018-05-29 LAB
ANION GAP SERPL CALC-SCNC: 8 MMOL/L (ref 0–11.9)
BUN SERPL-MCNC: 5 MG/DL (ref 8–22)
CALCIUM SERPL-MCNC: 8.4 MG/DL (ref 8.5–10.5)
CHLORIDE SERPL-SCNC: 110 MMOL/L (ref 96–112)
CO2 SERPL-SCNC: 24 MMOL/L (ref 20–33)
CREAT SERPL-MCNC: 0.51 MG/DL (ref 0.5–1.4)
GLUCOSE SERPL-MCNC: 93 MG/DL (ref 65–99)
MAGNESIUM SERPL-MCNC: 2.2 MG/DL (ref 1.5–2.5)
POTASSIUM SERPL-SCNC: 4 MMOL/L (ref 3.6–5.5)
SODIUM SERPL-SCNC: 142 MMOL/L (ref 135–145)

## 2018-05-29 PROCEDURE — A9270 NON-COVERED ITEM OR SERVICE: HCPCS | Performed by: STUDENT IN AN ORGANIZED HEALTH CARE EDUCATION/TRAINING PROGRAM

## 2018-05-29 PROCEDURE — 700102 HCHG RX REV CODE 250 W/ 637 OVERRIDE(OP): Performed by: PSYCHIATRY & NEUROLOGY

## 2018-05-29 PROCEDURE — 70553 MRI BRAIN STEM W/O & W/DYE: CPT

## 2018-05-29 PROCEDURE — 700105 HCHG RX REV CODE 258: Performed by: STUDENT IN AN ORGANIZED HEALTH CARE EDUCATION/TRAINING PROGRAM

## 2018-05-29 PROCEDURE — 770001 HCHG ROOM/CARE - MED/SURG/GYN PRIV*

## 2018-05-29 PROCEDURE — 700102 HCHG RX REV CODE 250 W/ 637 OVERRIDE(OP): Performed by: STUDENT IN AN ORGANIZED HEALTH CARE EDUCATION/TRAINING PROGRAM

## 2018-05-29 PROCEDURE — 80048 BASIC METABOLIC PNL TOTAL CA: CPT

## 2018-05-29 PROCEDURE — A9579 GAD-BASE MR CONTRAST NOS,1ML: HCPCS | Performed by: STUDENT IN AN ORGANIZED HEALTH CARE EDUCATION/TRAINING PROGRAM

## 2018-05-29 PROCEDURE — 700111 HCHG RX REV CODE 636 W/ 250 OVERRIDE (IP): Performed by: STUDENT IN AN ORGANIZED HEALTH CARE EDUCATION/TRAINING PROGRAM

## 2018-05-29 PROCEDURE — 700117 HCHG RX CONTRAST REV CODE 255: Performed by: STUDENT IN AN ORGANIZED HEALTH CARE EDUCATION/TRAINING PROGRAM

## 2018-05-29 PROCEDURE — 83735 ASSAY OF MAGNESIUM: CPT

## 2018-05-29 PROCEDURE — 99233 SBSQ HOSP IP/OBS HIGH 50: CPT | Mod: GC | Performed by: HOSPITALIST

## 2018-05-29 PROCEDURE — A9270 NON-COVERED ITEM OR SERVICE: HCPCS | Performed by: PSYCHIATRY & NEUROLOGY

## 2018-05-29 RX ORDER — HYDROMORPHONE HYDROCHLORIDE 2 MG/ML
.5-1 INJECTION, SOLUTION INTRAMUSCULAR; INTRAVENOUS; SUBCUTANEOUS
Status: DISPENSED | OUTPATIENT
Start: 2018-05-29 | End: 2018-05-30

## 2018-05-29 RX ORDER — MEPERIDINE HYDROCHLORIDE 25 MG/ML
25 INJECTION INTRAMUSCULAR; INTRAVENOUS; SUBCUTANEOUS ONCE
Status: COMPLETED | OUTPATIENT
Start: 2018-05-29 | End: 2018-05-29

## 2018-05-29 RX ORDER — MEPERIDINE HYDROCHLORIDE 50 MG/ML
25 INJECTION INTRAMUSCULAR; INTRAVENOUS; SUBCUTANEOUS ONCE
Status: DISCONTINUED | OUTPATIENT
Start: 2018-05-29 | End: 2018-05-29

## 2018-05-29 RX ORDER — HYDROMORPHONE HYDROCHLORIDE 2 MG/1
0.5 TABLET ORAL EVERY 4 HOURS PRN
Status: DISCONTINUED | OUTPATIENT
Start: 2018-05-29 | End: 2018-05-29

## 2018-05-29 RX ORDER — HYDROMORPHONE HYDROCHLORIDE 2 MG/ML
.5-1 INJECTION, SOLUTION INTRAMUSCULAR; INTRAVENOUS; SUBCUTANEOUS
Status: DISCONTINUED | OUTPATIENT
Start: 2018-05-29 | End: 2018-05-29

## 2018-05-29 RX ORDER — TEMAZEPAM 15 MG/1
15 CAPSULE ORAL
Status: DISCONTINUED | OUTPATIENT
Start: 2018-05-29 | End: 2018-05-31 | Stop reason: HOSPADM

## 2018-05-29 RX ORDER — LEVETIRACETAM 500 MG/1
1000 TABLET ORAL 2 TIMES DAILY
Status: DISCONTINUED | OUTPATIENT
Start: 2018-05-30 | End: 2018-05-31 | Stop reason: HOSPADM

## 2018-05-29 RX ADMIN — HYDROMORPHONE HYDROCHLORIDE 1 MG: 2 INJECTION INTRAMUSCULAR; INTRAVENOUS; SUBCUTANEOUS at 20:02

## 2018-05-29 RX ADMIN — HYDROMORPHONE HYDROCHLORIDE 0.5 MG: 2 TABLET ORAL at 17:31

## 2018-05-29 RX ADMIN — MEPERIDINE HYDROCHLORIDE 25 MG: 25 INJECTION INTRAMUSCULAR; INTRAVENOUS; SUBCUTANEOUS at 08:34

## 2018-05-29 RX ADMIN — DULOXETINE HYDROCHLORIDE 60 MG: 60 CAPSULE, DELAYED RELEASE ORAL at 21:38

## 2018-05-29 RX ADMIN — CALCIUM CARBONATE-CHOLECALCIFEROL TAB 250 MG-125 UNIT 1 TABLET: 250-125 TAB at 05:22

## 2018-05-29 RX ADMIN — DIPHENHYDRAMINE HYDROCHLORIDE 50 MG: 50 INJECTION, SOLUTION INTRAMUSCULAR; INTRAVENOUS at 01:59

## 2018-05-29 RX ADMIN — LORAZEPAM 0.5 MG: 0.5 TABLET ORAL at 20:02

## 2018-05-29 RX ADMIN — KETOROLAC TROMETHAMINE 15 MG: 30 INJECTION, SOLUTION INTRAMUSCULAR at 01:53

## 2018-05-29 RX ADMIN — HYDROXYCHLOROQUINE SULFATE 300 MG: 200 TABLET, FILM COATED ORAL at 05:21

## 2018-05-29 RX ADMIN — GABAPENTIN 600 MG: 300 CAPSULE ORAL at 21:38

## 2018-05-29 RX ADMIN — RISPERIDONE 1 MG: 1 TABLET, FILM COATED ORAL at 05:22

## 2018-05-29 RX ADMIN — SODIUM CHLORIDE 1000 MG: 9 INJECTION, SOLUTION INTRAVENOUS at 16:06

## 2018-05-29 RX ADMIN — DIPHENHYDRAMINE HYDROCHLORIDE 50 MG: 50 INJECTION, SOLUTION INTRAMUSCULAR; INTRAVENOUS at 18:57

## 2018-05-29 RX ADMIN — SODIUM CHLORIDE 1000 ML: 9 INJECTION, SOLUTION INTRAVENOUS at 10:54

## 2018-05-29 RX ADMIN — SODIUM CHLORIDE 1000 ML: 9 INJECTION, SOLUTION INTRAVENOUS at 16:05

## 2018-05-29 RX ADMIN — KETOROLAC TROMETHAMINE 15 MG: 30 INJECTION, SOLUTION INTRAMUSCULAR at 18:57

## 2018-05-29 RX ADMIN — ONDANSETRON 4 MG: 2 INJECTION INTRAMUSCULAR; INTRAVENOUS at 18:56

## 2018-05-29 RX ADMIN — TEMAZEPAM 15 MG: 15 CAPSULE ORAL at 21:39

## 2018-05-29 RX ADMIN — SODIUM CHLORIDE 1000 ML: 9 INJECTION, SOLUTION INTRAVENOUS at 04:53

## 2018-05-29 RX ADMIN — DULOXETINE HYDROCHLORIDE 60 MG: 60 CAPSULE, DELAYED RELEASE ORAL at 05:22

## 2018-05-29 RX ADMIN — GABAPENTIN 600 MG: 300 CAPSULE ORAL at 05:21

## 2018-05-29 RX ADMIN — OMEPRAZOLE 20 MG: 20 CAPSULE, DELAYED RELEASE ORAL at 05:22

## 2018-05-29 RX ADMIN — GABAPENTIN 600 MG: 300 CAPSULE ORAL at 16:06

## 2018-05-29 RX ADMIN — STANDARDIZED SENNA CONCENTRATE AND DOCUSATE SODIUM 2 TABLET: 8.6; 5 TABLET, FILM COATED ORAL at 21:39

## 2018-05-29 RX ADMIN — RISPERIDONE 1 MG: 1 TABLET, FILM COATED ORAL at 21:39

## 2018-05-29 RX ADMIN — ASPIRIN 81 MG: 81 TABLET, CHEWABLE ORAL at 05:22

## 2018-05-29 RX ADMIN — GADODIAMIDE 15 ML: 287 INJECTION INTRAVENOUS at 18:32

## 2018-05-29 RX ADMIN — HYDROMORPHONE HYDROCHLORIDE 0.5 MG: 2 TABLET ORAL at 13:12

## 2018-05-29 RX ADMIN — SODIUM CHLORIDE 1000 MG: 9 INJECTION, SOLUTION INTRAVENOUS at 05:21

## 2018-05-29 RX ADMIN — PREDNISONE 5 MG: 5 TABLET ORAL at 05:22

## 2018-05-29 RX ADMIN — DIPHENHYDRAMINE HYDROCHLORIDE 50 MG: 50 INJECTION, SOLUTION INTRAMUSCULAR; INTRAVENOUS at 10:56

## 2018-05-29 RX ADMIN — KETOROLAC TROMETHAMINE 15 MG: 30 INJECTION, SOLUTION INTRAMUSCULAR at 11:55

## 2018-05-29 ASSESSMENT — ENCOUNTER SYMPTOMS
HEADACHES: 1
NECK PAIN: 1
DOUBLE VISION: 0
WEAKNESS: 1
TINGLING: 0
FALLS: 0
PALPITATIONS: 0
DEPRESSION: 0
EYE PAIN: 0
DIAPHORESIS: 0
BACK PAIN: 1
FEVER: 0
LOSS OF CONSCIOUSNESS: 0
SENSORY CHANGE: 0
MYALGIAS: 0
FOCAL WEAKNESS: 1
MYALGIAS: 1
DIARRHEA: 1
DIZZINESS: 0
NECK PAIN: 0
NERVOUS/ANXIOUS: 1
SORE THROAT: 0
TREMORS: 0
BACK PAIN: 0
EYE DISCHARGE: 0
TINGLING: 1
CHILLS: 0
BRUISES/BLEEDS EASILY: 0
VOMITING: 0
EYE REDNESS: 0
COUGH: 0
SEIZURES: 0
SPUTUM PRODUCTION: 0
DIARRHEA: 0
SPEECH CHANGE: 0
BLOOD IN STOOL: 0
PHOTOPHOBIA: 1
SHORTNESS OF BREATH: 1
SENSORY CHANGE: 1
BLURRED VISION: 1
NAUSEA: 1
SHORTNESS OF BREATH: 0
NAUSEA: 0
ABDOMINAL PAIN: 0
CONSTIPATION: 0
PALPITATIONS: 1
POLYDIPSIA: 0

## 2018-05-29 ASSESSMENT — LIFESTYLE VARIABLES: SUBSTANCE_ABUSE: 0

## 2018-05-29 ASSESSMENT — PATIENT HEALTH QUESTIONNAIRE - PHQ9
SUM OF ALL RESPONSES TO PHQ9 QUESTIONS 1 AND 2: 0
1. LITTLE INTEREST OR PLEASURE IN DOING THINGS: NOT AT ALL

## 2018-05-29 ASSESSMENT — PAIN SCALES - GENERAL
PAINLEVEL_OUTOF10: 10
PAINLEVEL_OUTOF10: 4
PAINLEVEL_OUTOF10: 4
PAINLEVEL_OUTOF10: 8
PAINLEVEL_OUTOF10: 9
PAINLEVEL_OUTOF10: 5
PAINLEVEL_OUTOF10: 10
PAINLEVEL_OUTOF10: 6

## 2018-05-29 NOTE — CARE PLAN
Problem: Safety  Goal: Will remain free from injury  Outcome: PROGRESSING AS EXPECTED  Fall precautions in place. Treaded socks on pt. Appropriate signs on doorway. IV pole on same side as bathroom. Bedrails up. Bed in lowest position and locked.  Call light and phone within reach. Patient educated on importance of calling nurses before getting out of bed, verbalizes understanding. Bed alarm on.    Problem: Pain Management  Goal: Pain level will decrease to patient's comfort goal  Outcome: PROGRESSING AS EXPECTED   05/28/18 9482   OTHER   Nurse Pain Scale 0 - 10  9   Non Verbal Scale  Moaning   Comfort Goal Comfort at Rest;Comfort with Movement;Perform Activity;Sleep Comfortably   Administered Toradol 15 mg IV for 9/10 headache.

## 2018-05-29 NOTE — PROGRESS NOTES
" at bedside, pt wearing \"cooling cap\" brought in from home. Extra ice packs for cap placed in freezer. Pt arousabal to verbal stimuli.  "

## 2018-05-29 NOTE — PROGRESS NOTES
Tele removed per MD order. Pt updated that she will be transported to a neuro fl when bed available.

## 2018-05-29 NOTE — NON-PROVIDER
Internal Medicine Medical Student Note  Note Author: Sam Velazquez, Student    Name Enedelia Pang     1970   Age/Sex 47 y.o. female   MRN [id number]   Code Status FULL CODE     After 5PM or if no immediate response to page, please call for cross-coverage  Attending/Team: Red/Yellow See Patient List for primary contact information  Call [phone] to page after hours   1st Call - Day Intern (R1):   Dr. Man Smith 2nd Call - Day Sr. Resident (R2/R3):   Dr. Benny Espino         Reason for interval visit  (Principal Problem)   Complicated migraine    Interval Problem Daily Status Update  (24 hours)     Ms. Pang continueed to complain of 10/10 pain due to her migraine yesterday afternoon. In addition, she endorsed blurry vision and tingling of the extremities. She was subsequently treated with 1 mg Ativan for anxiety. She was later seen by Dr. Hamlin of neurology, who started her on Keppra for migraines and seizure prophylaxis.     The patient was scheduled for a brain MRI yesterday. However, the MRI was postponed, as a  shunt x-ray series was required. The MRI is now scheduled for today. Neurosurgery has been contacted and will evaluate the  shunt settings after the MRI.     The patient was started on Dialudid 0.5 mg Q4H PRN for pain control if Toradol and Benadryl are ineffective in managing symptoms. Vital signs are stable. MRI will be performed this afternoon.     Review of Systems   Constitutional: Positive for malaise/fatigue. Negative for chills and fever.   HENT: Positive for tinnitus. Negative for hearing loss.    Eyes: Positive for blurred vision.   Respiratory: Positive for shortness of breath.    Cardiovascular: Positive for palpitations.   Gastrointestinal: Positive for diarrhea. Negative for blood in stool, melena, nausea and vomiting.   Genitourinary: Negative.    Musculoskeletal: Positive for back pain, joint pain, myalgias and neck pain.   Skin: Negative.    Neurological:  Positive for tingling, sensory change, focal weakness, weakness and headaches.   Psychiatric/Behavioral: The patient is nervous/anxious.                Physical Exam       Vitals:    05/29/18 0000 05/29/18 0302 05/29/18 0800 05/29/18 1017   BP: 140/85 110/67 (!) 99/55 107/72   Pulse: 90 97 92 89   Resp: 16 16 16    Temp: 36 °C (96.8 °F) 36.1 °C (97 °F) 36.4 °C (97.6 °F)    SpO2: 100% 98% 95%    Weight:       Height:         Body mass index is 29.99 kg/m². Weight: 76.8 kg (169 lb 5 oz)  Oxygen Therapy:  Pulse Oximetry: 95 %, O2 (LPM): 0, O2 Delivery: None (Room Air)    Physical Exam   Constitutional: She is oriented to person, place, and time. She appears distressed.   HENT:   Head: Normocephalic and atraumatic.   Eyes: Conjunctivae and EOM are normal. Pupils are equal, round, and reactive to light. Right eye exhibits no discharge. Left eye exhibits no discharge.   Neck: Normal range of motion.   Cardiovascular: Regular rhythm.  Exam reveals no gallop and no friction rub.    Murmur heard.  Tachycardic.   Pulmonary/Chest: Breath sounds normal. No respiratory distress.   Abdominal: Soft. Bowel sounds are normal. There is no tenderness.   Neurological: She is alert and oriented to person, place, and time.     Imaging:    X-RAY ABDOMEN    HISTORY/REASON FOR EXAM:  Shunt series, headache.    TECHNIQUE/EXAM DESCRIPTION AND NUMBER OF VIEWS:  1 view(s) of the abdomen.    COMPARISON: 7/4/2015    FINDINGS:  RIGHT-sided  shunt catheter in place, terminating in the RIGHT lower quadrant.  Prior cholecystectomy.  Increased colonic stool.  No evidence for small bowel obstruction.  Artifact limits exam.   Impression       1.  RIGHT-sided  shunt catheter intact throughout its visualized course.  2.  Increased colonic stool suggesting constipation.     X-RAY CHEST    HISTORY/REASON FOR EXAM:  Shunt series, headache      TECHNIQUE/EXAM DESCRIPTION AND NUMBER OF VIEWS:  Single portable view of the chest.    COMPARISON:  10/7/2017    FINDINGS:  Artifact limits exam.  Cardiomediastinal contour is within normal limits.  Lungs show hypoinflation.  No focal pulmonary consolidation.  No pleural fluid collection or pneumothorax.  RIGHT-sided  shunt catheter in place, and intact.  RIGHT chest infusion port present.  Prior cholecystectomy.   Impression     X-RAY SKULL    1.  RIGHT sided  shunt catheter intact throughout its visualized course.  2.  Hypoinflation without other evidence for acute cardiopulmonary disease.     HISTORY/REASON FOR EXAM:  Headache.  Shunt series    TECHNIQUE/EXAM DESCRIPTION AND NUMBER OF VIEWS:  Skull series.    COMPARISON: 7/4/2015    FINDINGS:  RIGHT frontal ventriculostomy catheter in place.  Radiopaque portions of the catheter appear intact.   Impression       RIGHT-sided ventriculostomy catheter intact throughout its visualized course.             Assessment/Plan     #Migraines     Keppra and Dilaudid have been started for pain control. The revelation that the  shunt is intact and in place suggests that the diagnosis of idiopathic intracranial hypertension (pseudotumor cerebri) is significantly less likely. Brain tumor and abscess remain on the differential. An MRI with contrast will be performed this afternoon and will help to establish a definitive diagnosis of the migraines.      - MRI with gadolinium contrast pending   -  shunt must be checked by neurosurgery after MRI  - Continue IM Toradol and Benadryl for pain per home regimen  - Dilaudid 0.5 mg PO PRN (up to Q4H) for pain if not well-controlled with Toradol and Benadryl  - Antiemetics PRN  - Q4H neuro checks  - Keppra per neurology recommendations  - Neurology on board. Appreciate recs.     #Tachycardia     Tachycardia has resolved. The patient has had a heart rate in the 80s-90s today. She will be moved from telemetry to the neurology floor this afternoon.     - Resolved  - Continue lorazepam 0.5 mg PRN for anxiety     #Diarrhea  -  Resolved  - Potassium today 4.0, up from 3.1 yesterday     #RA  - Continue home Plaquenil and prednisone     #Depression  - Continue home Cymbalta and risperidone  - Order psychiatry consult to assess for underlying psychosis     Dispo: Inpatient for MRI and pain management

## 2018-05-29 NOTE — PROGRESS NOTES
"Explain to  and pt that it is not protocol nor appropriate to \"cuddle\"/lay in bed together while at this hospital. They state understanding and  returns to chair at bedside. Author also reinforce that hospital policy that he is unable to spend the night r/t shared room.   "

## 2018-05-29 NOTE — PROGRESS NOTES
Report received by dayshift RN. Assumed care of pt. Assessment complete, tele box on. S/O at bedside. Pt A&Ox4, VSS. Pt reports 9/10 pain from headache. Medicated per MAR. POC discussed. Call light within reach, bed in lowest position, and pt has no further questions at this time.

## 2018-05-29 NOTE — PROGRESS NOTES
Page by CAROLINA Hoyt at 4.11 PM for patient reporting symptoms of tingling in her arm and blurry vision. Patient examined at bedside. Patient alert, oriented x 4, pupils dilated about 4 mm, but equal and reactive to light. Cranial nerves 2-12 grossly intact. Right upper extremity 4/5 strength, right lower, left upper and lower extremities 5/5. 2 + patellar reflexes bilaterally. No change in sensation. Normal speech. The neurological examination appears to be unchanged from her admission exam. Patient reports 10/10 headache and no improvement with her current medications. She also admits to feeling significant anxiety. Ativan 1 mg PO ordered. Continue Q4H neurochecks. Keppra 2 g IV ordered per neurology recommendations.

## 2018-05-29 NOTE — PROGRESS NOTES
Ok from Dr. Newell for pt to go down to MRI as neurosurgeon is coordinated to check shunt setting after.   Author called MRI to update, they state it will probably be done later in the day.

## 2018-05-29 NOTE — PROGRESS NOTES
"Pt refusing to wear tele box. Pt states, \"I don't want to be hooked up to your heart monitor if you don't give me stronger pain meds! I've already called my  to come pick me up.\" Reeducated and explained to pt that the MD has ordered for no narcotics for MRI procedure, and that the last toradol was given at 0200.   "

## 2018-05-29 NOTE — CONSULTS
CC:  Jolts in head, feel spaced out    Date of Admission: 5/28/2018    Today's Date: 05/28/18    Consulting Physician: FRANDY Newell M.D.      HPI:    Enedelia Pang is a 47 y.o. Female Notes severe sharp jolts inside all of head, nothing improves or worsens, and feels spaced out often brief moments.  notes several minutes of confusion mixing up patient names and dates, since yesterday had many events but none today.  3m ago 6-7 sz described as GTC in night.  Notes with migraine her pupils dilate. No trauma.  Had occip stim device for migraine removed about month ago. Hx of CVA with right weakness remnant trace.  Hx of IIH had VPS unclear when removed.  No other complaints.       ROS:   Constitutional: No fevers or chills.  Eyes: No blurry vision or eye pain.  ENT: No dysphagia or hearing loss.  Respiratory: No cough or shortness of breath.  Cardiovascular: No chest pain or palpitations.  GI: No nausea, vomiting, or diarrhea.  : No urinary incontinence or dysuria.  Musculoskeletal: No joint swelling or arthralgias.  Skin: No skin rashes.  Neuro: See HPI.  Endocrine: No heat or cold intolerance. No polydipsia or polyuria.  Psych: No depression or anxiety.  Heme/Lymph: No easy bruising or swollen lymph nodes.  All other systems were reviewed and were negative.       Past Medical History:   Past Medical History:   Diagnosis Date   • Anemia 10/05/2017    Unsure if a current issue.   • Allergy, unspecified not elsewhere classified    • Anxiety    • autoimmune    • Depression    • H/O Clostridium difficile infection    • Hx MRSA infection    • Hydrocephalus     with lumbar shunt   • Migraine with aura, with intractable migraine, so stated, without mention of status migrainosus    • MRSA (methicillin resistant Staphylococcus aureus)    • Pituitary abnormality (HCC)    • Staph infection    • Stroke (HCC)     2007       Past Surgical History:   Past Surgical History:   Procedure Laterality Date   •  SHUNT  "INSERTION  5/31/2017    Procedure:  SHUNT INSERTION;  Surgeon: Drew Berry M.D.;  Location: SUR[facility]ERY [facility];  Service:    • SPINAL CORD STIMULATOR  12/19/2016    Procedure: SPINAL CORD STIMULATOR FOR: PLACEMENT OF LEFT FLANK OCCIPITAL NERVE STIMULATOR BATTERY PACK;  Surgeon: Oli Oh III, M.D.;  Location: SUR[facility]ERY [facility];  Service:    • LUMBAR EXPLORATION N/A 8/31/2015    Procedure: LUMBAR EXPLORATION- Lumbar shunt removal ;  Surgeon: Oli Oh III, M.D.;  Location: SUR[facility]ERY [facility];  Service:    • IRRIGATION & DEBRIDEMENT NEURO N/A 6/28/2015    Procedure: IRRIGATION & DEBRIDEMENT NEURO;  Surgeon: Oli Oh III, M.D.;  Location: SUR[facility]ERY [facility];  Service:    • APPENDECTOMY     • CHOLECYSTECTOMY     • OTHER      right knee surgery   • OTHER NEUROLOGICAL SURG      occipital nerve stimulator   • TONSILLECTOMY     • TUBAL LIGATION         Social History:   Social History     Social History   • Marital status:      Spouse name: N/A   • Number of children: N/A   • Years of education: N/A     Occupational History   •       on disability     Social History Main Topics   • Smoking status: Never Smoker   • Smokeless tobacco: Never Used   • Alcohol use Yes      Comment: Rare   • Drug use: No   • Sexual activity: Not on file     Other Topics Concern   • Not on file     Social History Narrative   • No narrative on file       Family History:   Family History   Problem Relation Age of Onset   • No Known Problems Mother    • No Known Problems Father        Allergies:   Allergies   Allergen Reactions   • Sumatriptan Unspecified     Historical=\"Heart stops.\" Reaction  between 1995 to 1997   • Prochlorperazine Swelling     Tongue swelling. Reaction as a teen. (compazine)  Tolerated Phenergan on 02/24/15   • Vancomycin Shortness of Breath and Rash     Reaction in 2005.  D/W patient 8/31/15 - tolerated loading dose of vancomycin administered on 8/30/15 with some " itching to chest with decreased infusion rate. Red Man Syndrome         Current Facility-Administered Medications:   •  senna-docusate (PERICOLACE or SENOKOT S) 8.6-50 MG per tablet 2 Tab, 2 Tab, Oral, BID, 2 Tab at 05/28/18 0615 **AND** polyethylene glycol/lytes (MIRALAX) PACKET 1 Packet, 1 Packet, Oral, QDAY PRN **AND** magnesium hydroxide (MILK OF MAGNESIA) suspension 30 mL, 30 mL, Oral, QDAY PRN **AND** bisacodyl (DULCOLAX) suppository 10 mg, 10 mg, Rectal, QDAY PRN, Steve Rawls M.D.  •  ondansetron (ZOFRAN) syringe/vial injection 4 mg, 4 mg, Intravenous, Q4HRS PRN, Steve Rawls M.D.  •  ondansetron (ZOFRAN ODT) dispertab 4 mg, 4 mg, Oral, Q4HRS PRN, Steve Rawls M.D.  •  aspirin (ASA) chewable tab 81 mg, 81 mg, Oral, DAILY, Steve Rawls M.D., 81 mg at 05/28/18 0613  •  DULoxetine (CYMBALTA) capsule 60 mg, 60 mg, Oral, BID, Steve Rawls M.D., 60 mg at 05/28/18 0613  •  hydroxychloroquine (PLAQUENIL) tablet 300 mg, 300 mg, Oral, DAILY, Steve Rawls M.D., 300 mg at 05/28/18 0614  •  omeprazole (PRILOSEC) capsule 20 mg, 20 mg, Oral, DAILY, Steve Rawls M.D., 20 mg at 05/28/18 0614  •  predniSONE (DELTASONE) tablet 5 mg, 5 mg, Oral, DAILY, Steve Rawls M.D., 5 mg at 05/28/18 0615  •  risperiDONE (RISPERDAL) tablet 1 mg, 1 mg, Oral, BID, Steve Rawls M.D., 1 mg at 05/28/18 0615  •  gabapentin (NEURONTIN) capsule 600 mg, 600 mg, Oral, TID, Steve Rawls M.D., 600 mg at 05/28/18 1450  •  oyster shell calcium/vitamin D 250-125 MG-UNIT tablet 1 Tab, 1 Tab, Oral, DAILY, Steve Rawls M.D., 1 Tab at 05/28/18 0615  •  ketorolac (TORADOL) injection 15 mg, 15 mg, Intravenous, Q6HRS PRN, Man Smith M.D., 15 mg at 05/28/18 1143  •  LORazepam (ATIVAN) tablet 0.5 mg, 0.5 mg, Oral, Q8HRS PRN, Man Smith M.D.  •  diphenhydrAMINE (BENADRYL) injection 50 mg, 50 mg, Intravenous, Q6HRS PRN, Man Smith M.D., 50 mg at 05/28/18 1235  •  NS infusion 1,000 mL, 1,000 mL, Intravenous, Continuous, Benny  ROLANDA Espino M.D., Last Rate: 175 mL/hr at 05/28/18 1327, 1,000 mL at 05/28/18 1327  •  levETIRAcetam (KEPPRA) 2,000 mg in  mL IVPB, 2,000 mg, Intravenous, Once, Jayme Mejia M.D.  •  [START ON 5/29/2018] levETIRAcetam (KEPPRA) 1,000 mg in  mL IVPB, 1,000 mg, Intravenous, Q12HRS, Jayme Mejia M.D.      PHYSICAL EXAM    Vitals:    05/28/18 0933 05/28/18 1200 05/28/18 1535 05/28/18 1600   BP:  109/71  121/72   Pulse: 99 (!) 108 95 94   Resp:  16  18   Temp:  37.3 °C (99.1 °F)  36.8 °C (98.3 °F)   SpO2: 98% 93%  100%   Weight:       Height:           Head/Neck: NCAT no meningismus neg kernig neg brudzinski. No obvious mass or heard bruit. No tender arteries or lost pulses.  No rash of head or neck. VPS not palpated.  Skin: Warm, dry, intact. No rashes observed head/neck or body  Eyes/Funduscopic: Optic discs flat with no evidence of papilledema or pallor. Normal posterior segments.    Mental Status: Awake, alert, oriented x 3. Names/repeats/fluent/follows commands. No neglect/extinction. Attention and concentration, recent & remote memory, Fund of Knowledge wnl.  Cranial Nerves: CN II-XII intact. PERRL 5mm noted normal with migraine.   No afferent pupillary defect. EOM full. VF full. sym grimace & eye closure. No nystagmus.     Motor: strength/bulk/tone wnl.  intact and sym, no abn mvmts. rght arm weak noted by her only subtle stroke remnant.  Sensory: Intact light touch & sharp  Coordination: finger to nose & heel to shin intact.   DTR's: intact/sym. no clonus. Toes mute.  Gait/Station: n/a fall concern     NIHSS: n/a    Labs:  Recent Labs      05/28/18   0857   WBC  8.5   RBC  4.22   HEMOGLOBIN  13.5   HEMATOCRIT  39.8   MCV  94.3   MCH  32.0   MCHC  33.9   RDW  43.4   PLATELETCT  352   MPV  10.0     Recent Labs      05/28/18   0111  05/28/18   0857   SODIUM  137  141   POTASSIUM  3.1*  4.3   CHLORIDE  104  107   CO2  23  25   GLUCOSE  89  88   BUN  8  6*   CREATININE  0.57  0.48*   CALCIUM  8.8  9.1                      Recent Labs      05/28/18   0111  05/28/18   0857   SODIUM  137  141   POTASSIUM  3.1*  4.3   CHLORIDE  104  107   CO2  23  25   GLUCOSE  89  88   BUN  8  6*     Recent Labs      05/28/18   0111  05/28/18   0857   SODIUM  137  141   POTASSIUM  3.1*  4.3   CHLORIDE  104  107   CO2  23  25   BUN  8  6*   CREATININE  0.57  0.48*   MAGNESIUM  1.6   --    PHOSPHORUS  3.1   --    CALCIUM  8.8  9.1         Results for orders placed or performed during the hospital encounter of 10/29/14   2-D ECHO W/DOPPLER (ECHOCARDIOGRAM COMP W/O CONT)   Result Value Ref Range    Eject.Frac. MOD BP 72.63     Eject.Frac. MOD 4C 71.9     Eject.Frac. MOD 2C 69.76               Imaging: neuroimaging reviewed and directly visualized by me  MR-BRAIN-WITH & W/O    (Results Pending)   DX-SKULL-LIMITED 3-    (Results Pending)   DX-CHEST-LIMITED (1 VIEW)    (Results Pending)   DX-SPINE-ANY ONE VIEW    (Results Pending)   [id number] VIEW    (Results Pending)       Assessment/Plan:    HEADACHES, HX OF MIGRAINE, SEIZURES, IIH/VPS  SPELLS, SEIZURE SUSPECTED    - jabs/jolts common with migraine, but concern for post ictal headaches with sz hx and confusion spells  - keppra 2gm IV load, then 1g IV/PO q12hrs. Good for migraine and seizure.  - MRI brain, may need shunt series prior seems was removed  - consider EEG if continued sz events  - will follow    Viktor Hamlin M.D.  , Neurohospitalist & Stroke  Clinical Professor, [employer]  Diplomate, Neurology & Neuroimaging

## 2018-05-30 ENCOUNTER — PATIENT OUTREACH (OUTPATIENT)
Dept: HEALTH INFORMATION MANAGEMENT | Facility: OTHER | Age: 48
End: 2018-05-30

## 2018-05-30 PROBLEM — Z76.5 DRUG-SEEKING BEHAVIOR: Status: ACTIVE | Noted: 2018-05-30

## 2018-05-30 PROBLEM — F19.20 SUBSTANCE DEPENDENCE (HCC): Status: ACTIVE | Noted: 2018-05-30

## 2018-05-30 PROCEDURE — 4A00X4Z MEASUREMENT OF CENTRAL NERVOUS ELECTRICAL ACTIVITY, EXTERNAL APPROACH: ICD-10-PCS | Performed by: PSYCHIATRY & NEUROLOGY

## 2018-05-30 PROCEDURE — A9270 NON-COVERED ITEM OR SERVICE: HCPCS | Performed by: PSYCHIATRY & NEUROLOGY

## 2018-05-30 PROCEDURE — 770001 HCHG ROOM/CARE - MED/SURG/GYN PRIV*

## 2018-05-30 PROCEDURE — 700105 HCHG RX REV CODE 258: Performed by: STUDENT IN AN ORGANIZED HEALTH CARE EDUCATION/TRAINING PROGRAM

## 2018-05-30 PROCEDURE — 700102 HCHG RX REV CODE 250 W/ 637 OVERRIDE(OP): Performed by: STUDENT IN AN ORGANIZED HEALTH CARE EDUCATION/TRAINING PROGRAM

## 2018-05-30 PROCEDURE — A9270 NON-COVERED ITEM OR SERVICE: HCPCS | Performed by: HOSPITALIST

## 2018-05-30 PROCEDURE — A9270 NON-COVERED ITEM OR SERVICE: HCPCS | Performed by: STUDENT IN AN ORGANIZED HEALTH CARE EDUCATION/TRAINING PROGRAM

## 2018-05-30 PROCEDURE — 700111 HCHG RX REV CODE 636 W/ 250 OVERRIDE (IP): Performed by: HOSPITALIST

## 2018-05-30 PROCEDURE — 700111 HCHG RX REV CODE 636 W/ 250 OVERRIDE (IP): Performed by: STUDENT IN AN ORGANIZED HEALTH CARE EDUCATION/TRAINING PROGRAM

## 2018-05-30 PROCEDURE — 700102 HCHG RX REV CODE 250 W/ 637 OVERRIDE(OP): Performed by: HOSPITALIST

## 2018-05-30 PROCEDURE — 99232 SBSQ HOSP IP/OBS MODERATE 35: CPT | Mod: GC | Performed by: HOSPITALIST

## 2018-05-30 PROCEDURE — 95951 EEG: CPT | Mod: 52

## 2018-05-30 PROCEDURE — 700102 HCHG RX REV CODE 250 W/ 637 OVERRIDE(OP): Performed by: PSYCHIATRY & NEUROLOGY

## 2018-05-30 RX ORDER — HYDROMORPHONE HYDROCHLORIDE 2 MG/ML
1 INJECTION, SOLUTION INTRAMUSCULAR; INTRAVENOUS; SUBCUTANEOUS EVERY 4 HOURS PRN
Status: DISCONTINUED | OUTPATIENT
Start: 2018-05-30 | End: 2018-05-31

## 2018-05-30 RX ADMIN — DIPHENHYDRAMINE HYDROCHLORIDE 50 MG: 50 INJECTION, SOLUTION INTRAMUSCULAR; INTRAVENOUS at 15:38

## 2018-05-30 RX ADMIN — HYDROMORPHONE HYDROCHLORIDE 1 MG: 2 INJECTION INTRAMUSCULAR; INTRAVENOUS; SUBCUTANEOUS at 00:04

## 2018-05-30 RX ADMIN — KETOROLAC TROMETHAMINE 15 MG: 30 INJECTION, SOLUTION INTRAMUSCULAR at 01:15

## 2018-05-30 RX ADMIN — CALCIUM CARBONATE-CHOLECALCIFEROL TAB 250 MG-125 UNIT 1 TABLET: 250-125 TAB at 05:10

## 2018-05-30 RX ADMIN — TEMAZEPAM 15 MG: 15 CAPSULE ORAL at 21:40

## 2018-05-30 RX ADMIN — HYDROMORPHONE HYDROCHLORIDE 1 MG: 2 INJECTION INTRAMUSCULAR; INTRAVENOUS; SUBCUTANEOUS at 17:58

## 2018-05-30 RX ADMIN — RISPERIDONE 1 MG: 1 TABLET, FILM COATED ORAL at 17:59

## 2018-05-30 RX ADMIN — SODIUM CHLORIDE 1000 ML: 9 INJECTION, SOLUTION INTRAVENOUS at 10:53

## 2018-05-30 RX ADMIN — DIPHENHYDRAMINE HYDROCHLORIDE 50 MG: 50 INJECTION, SOLUTION INTRAMUSCULAR; INTRAVENOUS at 01:15

## 2018-05-30 RX ADMIN — STANDARDIZED SENNA CONCENTRATE AND DOCUSATE SODIUM 2 TABLET: 8.6; 5 TABLET, FILM COATED ORAL at 17:59

## 2018-05-30 RX ADMIN — SODIUM CHLORIDE 1000 ML: 9 INJECTION, SOLUTION INTRAVENOUS at 17:59

## 2018-05-30 RX ADMIN — GABAPENTIN 600 MG: 300 CAPSULE ORAL at 05:10

## 2018-05-30 RX ADMIN — HYDROMORPHONE HYDROCHLORIDE 1 MG: 2 INJECTION INTRAMUSCULAR; INTRAVENOUS; SUBCUTANEOUS at 05:09

## 2018-05-30 RX ADMIN — RISPERIDONE 1 MG: 1 TABLET, FILM COATED ORAL at 05:10

## 2018-05-30 RX ADMIN — KETOROLAC TROMETHAMINE 15 MG: 30 INJECTION, SOLUTION INTRAMUSCULAR at 15:38

## 2018-05-30 RX ADMIN — LEVETIRACETAM 1000 MG: 500 TABLET ORAL at 17:59

## 2018-05-30 RX ADMIN — KETOROLAC TROMETHAMINE 15 MG: 30 INJECTION, SOLUTION INTRAMUSCULAR at 21:40

## 2018-05-30 RX ADMIN — HYDROMORPHONE HYDROCHLORIDE 1 MG: 2 INJECTION INTRAMUSCULAR; INTRAVENOUS; SUBCUTANEOUS at 23:27

## 2018-05-30 RX ADMIN — PREDNISONE 5 MG: 5 TABLET ORAL at 05:10

## 2018-05-30 RX ADMIN — DIPHENHYDRAMINE HYDROCHLORIDE 50 MG: 50 INJECTION, SOLUTION INTRAMUSCULAR; INTRAVENOUS at 07:55

## 2018-05-30 RX ADMIN — HYDROMORPHONE HYDROCHLORIDE 1 MG: 2 INJECTION INTRAMUSCULAR; INTRAVENOUS; SUBCUTANEOUS at 13:47

## 2018-05-30 RX ADMIN — DULOXETINE HYDROCHLORIDE 60 MG: 60 CAPSULE, DELAYED RELEASE ORAL at 05:10

## 2018-05-30 RX ADMIN — HYDROXYCHLOROQUINE SULFATE 300 MG: 200 TABLET, FILM COATED ORAL at 05:18

## 2018-05-30 RX ADMIN — GABAPENTIN 600 MG: 300 CAPSULE ORAL at 17:58

## 2018-05-30 RX ADMIN — LEVETIRACETAM 1000 MG: 500 TABLET ORAL at 05:11

## 2018-05-30 RX ADMIN — OMEPRAZOLE 20 MG: 20 CAPSULE, DELAYED RELEASE ORAL at 05:11

## 2018-05-30 RX ADMIN — SODIUM CHLORIDE 1000 ML: 9 INJECTION, SOLUTION INTRAVENOUS at 00:04

## 2018-05-30 RX ADMIN — ASPIRIN 81 MG: 81 TABLET, CHEWABLE ORAL at 05:11

## 2018-05-30 RX ADMIN — SODIUM CHLORIDE 1000 ML: 9 INJECTION, SOLUTION INTRAVENOUS at 23:51

## 2018-05-30 RX ADMIN — DULOXETINE HYDROCHLORIDE 60 MG: 60 CAPSULE, DELAYED RELEASE ORAL at 17:58

## 2018-05-30 RX ADMIN — STANDARDIZED SENNA CONCENTRATE AND DOCUSATE SODIUM 2 TABLET: 8.6; 5 TABLET, FILM COATED ORAL at 05:10

## 2018-05-30 RX ADMIN — DIPHENHYDRAMINE HYDROCHLORIDE 50 MG: 50 INJECTION, SOLUTION INTRAMUSCULAR; INTRAVENOUS at 21:40

## 2018-05-30 RX ADMIN — KETOROLAC TROMETHAMINE 15 MG: 30 INJECTION, SOLUTION INTRAMUSCULAR at 07:55

## 2018-05-30 RX ADMIN — SODIUM CHLORIDE 1000 ML: 9 INJECTION, SOLUTION INTRAVENOUS at 05:10

## 2018-05-30 ASSESSMENT — ENCOUNTER SYMPTOMS
SEIZURES: 0
HEARTBURN: 0
POLYDIPSIA: 0
TREMORS: 0
TINGLING: 0
CHILLS: 0
SORE THROAT: 0
NECK PAIN: 0
BACK PAIN: 0
DOUBLE VISION: 0
SENSORY CHANGE: 0
HEADACHES: 1
DEPRESSION: 0
DIZZINESS: 0
PALPITATIONS: 0
SHORTNESS OF BREATH: 0
BLURRED VISION: 0
FEVER: 0
VOMITING: 0
NAUSEA: 0
DIAPHORESIS: 0
SPEECH CHANGE: 0
WHEEZING: 0
PHOTOPHOBIA: 1
COUGH: 0

## 2018-05-30 ASSESSMENT — PAIN SCALES - GENERAL
PAINLEVEL_OUTOF10: 8
PAINLEVEL_OUTOF10: 5
PAINLEVEL_OUTOF10: 5
PAINLEVEL_OUTOF10: 10
PAINLEVEL_OUTOF10: 10
PAINLEVEL_OUTOF10: 7
PAINLEVEL_OUTOF10: 6
PAINLEVEL_OUTOF10: 10
PAINLEVEL_OUTOF10: 8
PAINLEVEL_OUTOF10: 10

## 2018-05-30 NOTE — PROGRESS NOTES
Spoke with Dr. Rawls in regards to pts pain medication regimine and pt c/o of 10/10 pain still. MD stated he would increase her diluadid and could start the new dose now and see if that will help.

## 2018-05-30 NOTE — DISCHARGE PLANNING
"Medical Social Work    LSW received VM from [facility] and [facility] requesting clinicals since \"pt is appealing her discharge\". This LSW has not given pt an IMM letter and is not medically clear for d/c at this time. LSW left VM for Melva in bed day to update and to see if she is aware of this. LSW left VM for [facility] requesting call back.    LSW attempted to get in contact with [facility] appeals department but was not successful and unable to leave VM. [facility] [phone]    "

## 2018-05-30 NOTE — CARE PLAN
Problem: Communication  Goal: The ability to communicate needs accurately and effectively will improve  Listened to patients discussion of concerns related to disease process and treatment plan. Discussed with patient concerns, goals and management. Discussed importance of patient reporting new signs and symptoms related to disease process. Patient verbalized understanding and understands importance of communicating needs.  Supported and educated patient by addressing specific questions regarding diagnosis, risks, benefits of pain management treatment.              Problem: Infection  Goal: Will remain free from infection  Pt has a port accessed currently, no s/s of infection. Will continue to monitor.

## 2018-05-30 NOTE — ASSESSMENT & PLAN NOTE
Patient seem to be dependent on opiates, and upset that her opiates discontinued  Will need pain management follow up 1 week after discharge

## 2018-05-30 NOTE — PROGRESS NOTES
Pt upset about dilaudid being discontinued, and would like to talk to the doctors. [employer] yellow team notified.

## 2018-05-30 NOTE — PROGRESS NOTES
"S- DEMEROL OR DILAUDID, NO ONE TAKES ME SERIOUS TREAT ME LIKE A DOG.  I HAVE TORADOL AND BENADRYL AT HOME WHY WOULD I COME HERE. I CANT TAKE DILAUDID HOME. JUST NEED TO SLEEP AND RESET AND I WILL BE FINE.  No other complaints.     O-   Allergies:   Allergies   Allergen Reactions   • Sumatriptan Unspecified     Historical=\"Heart stops.\" Reaction  between 1995 to 1997   • Prochlorperazine Swelling     Tongue swelling. Reaction as a teen. (compazine)  Tolerated Phenergan on 02/24/15   • Vancomycin Shortness of Breath and Rash     Reaction in 2005.  D/W patient 8/31/15 - tolerated loading dose of vancomycin administered on 8/30/15 with some itching to chest with decreased infusion rate. Red Man Syndrome         Current Facility-Administered Medications:   •  temazepam (RESTORIL) capsule 15 mg, 15 mg, Oral, QHS PRN, Viktor Hamlin M.D.  •  [START ON 5/30/2018] levETIRAcetam (KEPPRA) tablet 1,000 mg, 1,000 mg, Oral, BID, FRANDY Newell M.D.  •  HYDROmorphone (DILAUDID) injection 0.5-1 mg, 0.5-1 mg, Intravenous, Q2HRS PRN, Steve Rawls M.D., 1 mg at 05/29/18 2002  •  senna-docusate (PERICOLACE or SENOKOT S) 8.6-50 MG per tablet 2 Tab, 2 Tab, Oral, BID, 2 Tab at 05/28/18 0615 **AND** polyethylene glycol/lytes (MIRALAX) PACKET 1 Packet, 1 Packet, Oral, QDAY PRN **AND** magnesium hydroxide (MILK OF MAGNESIA) suspension 30 mL, 30 mL, Oral, QDAY PRN **AND** bisacodyl (DULCOLAX) suppository 10 mg, 10 mg, Rectal, QDAY PRN, Steve Rawls M.D.  •  ondansetron (ZOFRAN) syringe/vial injection 4 mg, 4 mg, Intravenous, Q4HRS PRN, Steve Rawls M.D., 4 mg at 05/29/18 4716  •  ondansetron (ZOFRAN ODT) dispertab 4 mg, 4 mg, Oral, Q4HRS PRN, Steve Rawls M.D.  •  aspirin (ASA) chewable tab 81 mg, 81 mg, Oral, DAILY, Steve Rawls M.D., 81 mg at 05/29/18 0522  •  DULoxetine (CYMBALTA) capsule 60 mg, 60 mg, Oral, BID, Steve Rawls M.D., 60 mg at 05/29/18 0522  •  hydroxychloroquine (PLAQUENIL) tablet 300 mg, 300 mg, Oral, " DAILY, Steve Rawls M.D., 300 mg at 05/29/18 0521  •  omeprazole (PRILOSEC) capsule 20 mg, 20 mg, Oral, DAILY, Steve Rawls M.D., 20 mg at 05/29/18 0522  •  predniSONE (DELTASONE) tablet 5 mg, 5 mg, Oral, DAILY, Steve Rawls M.D., 5 mg at 05/29/18 0522  •  risperiDONE (RISPERDAL) tablet 1 mg, 1 mg, Oral, BID, Steve Rawls M.D., 1 mg at 05/29/18 0522  •  gabapentin (NEURONTIN) capsule 600 mg, 600 mg, Oral, TID, Steve Rawls M.D., 600 mg at 05/29/18 1606  •  oyster shell calcium/vitamin D 250-125 MG-UNIT tablet 1 Tab, 1 Tab, Oral, DAILY, Steve Rawls M.D., 1 Tab at 05/29/18 0522  •  ketorolac (TORADOL) injection 15 mg, 15 mg, Intravenous, Q6HRS PRN, Man Smith M.D., 15 mg at 05/29/18 1857  •  LORazepam (ATIVAN) tablet 0.5 mg, 0.5 mg, Oral, Q8HRS PRN, Man Smith M.D., 0.5 mg at 05/29/18 2002  •  diphenhydrAMINE (BENADRYL) injection 50 mg, 50 mg, Intravenous, Q6HRS PRN, Man Smith M.D., 50 mg at 05/29/18 1857  •  NS infusion 1,000 mL, 1,000 mL, Intravenous, Continuous, Benny Espino M.D., Last Rate: 175 mL/hr at 05/29/18 1605, 1,000 mL at 05/29/18 1605      PHYSICAL EXAM    Vitals:    05/29/18 0800 05/29/18 1017 05/29/18 1600 05/29/18 1938   BP: (!) 99/55 107/72 114/70 142/98   Pulse: 92 89 92 (!) 121   Resp: 16 16 18   Temp: 36.4 °C (97.6 °F)  36.8 °C (98.2 °F) 37.1 °C (98.8 °F)   SpO2: 95%  100% 95%   Weight:    84 kg (185 lb 3 oz)   Height:           Head/Neck: NCAT no meningismus neg kernig neg brudzinski. No obvious mass or heard bruit. No tender arteries or lost pulses. Right side VPS palpated non tender no obv abn skin.   Skin: Warm, dry, intact. No rashes observed head/neck or body  Eyes/Funduscopic: Normal posterior segments. No photosensitive.    Mental Status: Awake, alert, oriented x 3. Names/repeats/fluent/follows commands. No neglect/extinction. Attention and concentration, recent & remote memory, Fund of Knowledge wnl.  Cranial Nerves: CN II-XII intact. PERRL 4mm.   No  afferent pupillary defect. EOM full. VF full. sym grimace & eye closure. No nystagmus.     Motor: strength/bulk/tone wnl.  intact and sym, no abn mvmt. Tremulous.  Sensory: Intact light touch & sharp  Coordination: finger to nose & heel to shin intact.   DTR's: intact/sym. no clonus. Toes mute.  Gait/Station: n/a fall concern         Labs:  Recent Labs      05/28/18 0857   WBC  8.5   RBC  4.22   HEMOGLOBIN  13.5   HEMATOCRIT  39.8   MCV  94.3   MCH  32.0   MCHC  33.9   RDW  43.4   PLATELETCT  352   MPV  10.0     Recent Labs      05/28/18   0111  05/28/18   0857  05/29/18   0042   SODIUM  137  141  142   POTASSIUM  3.1*  4.3  4.0   CHLORIDE  104  107  110   CO2  23  25  24   GLUCOSE  89  88  93   BUN  8  6*  5*   CREATININE  0.57  0.48*  0.51   CALCIUM  8.8  9.1  8.4*                     Recent Labs      05/28/18   0111  05/28/18   0857  05/29/18   0042   SODIUM  137  141  142   POTASSIUM  3.1*  4.3  4.0   CHLORIDE  104  107  110   CO2  23  25  24   GLUCOSE  89  88  93   BUN  8  6*  5*     Recent Labs      05/28/18   0111  05/28/18   0857  05/29/18   0042   SODIUM  137  141  142   POTASSIUM  3.1*  4.3  4.0   CHLORIDE  104  107  110   CO2  23  25  24   BUN  8  6*  5*   CREATININE  0.57  0.48*  0.51   MAGNESIUM  1.6   --   2.2   PHOSPHORUS  3.1   --    --    CALCIUM  8.8  9.1  8.4*         Results for orders placed or performed during the hospital encounter of 10/29/14   2-D ECHO W/DOPPLER (ECHOCARDIOGRAM COMP W/O CONT)   Result Value Ref Range    Eject.Frac. MOD BP 72.63     Eject.Frac. MOD 4C 71.9     Eject.Frac. MOD 2C 69.76               Imaging: neuroimaging reviewed and directly visualized by me  [id number] VIEW   Final Result      1.  RIGHT-sided  shunt catheter intact throughout its visualized course.   2.  Increased colonic stool suggesting constipation.      DX-CHEST-LIMITED (1 VIEW)   Final Result      1.  RIGHT sided  shunt catheter intact throughout its visualized course.   2.  Hypoinflation  without other evidence for acute cardiopulmonary disease.         DX-SKULL-LIMITED 3-   Final Result      RIGHT-sided ventriculostomy catheter intact throughout its visualized course.      MR-BRAIN-WITH & W/O    (Results Pending)       Assessment/Plan:    HEADACHES, HX OF MIGRAINE, SEIZURES, IIH/VPS MR FINAL READ PENDING NO OBV ABN DESPITE ARTIFACT FROM SHUNT OBSCURES MUCH NO HYRDOCEPHALUS NOTED. MAY BE PAIN MED SEEKING ASKING FOR DEMEROL & DILAUDID SPECIFICALLY     SPELLS, SEIZURE SUSPECTED     - jabs/jolts common with migraine, but concern for post ictal headaches with sz hx and confusion spells. Pain med seeking seems should have pain mngmt consult, tremulous and upset.     - keppra 2gm IV load, then 1g IV/PO q12hrs. Good for migraine and seizure.    - consider EEG if continued sz events    Report to [employer] for driving restriction; advised no activities where LOC a danger until neuro MD clears as otpt    Discussed seizure hygiene, triggers, and AED compliance/side effects/teratogenicity     - TEMAZEPAM 15MG PRN INSOMNIA     - neuro sign off, reconsult PRN.  F/u otpt Neuro ASAP.           Viktor Hamlin M.D.  , Neurohospitalist & Stroke  Clinical Professor, [employer]  Diplomate, Neurology & Neuroimaging

## 2018-05-30 NOTE — EEG PROGRESS NOTE
VIDEO ELECTROENCEPHALOGRAM REPORT      Referring MD: Dr. Espino.     DOS:  5/30/2018 (total recording of 32 minutes).     INDICATION:  Enedelia Pang 47 y.o. female presenting with headaches.     CURRENT ANTIEPILEPTIC REGIMEN: Levetiracetam, Gabapentin, Lorazepam.     TECHNIQUE: 30 channel video electroencephalogram (EEG) was performed in accordance with the international 10-20 system. The study was reviewed in bipolar and referential montages. The recording examined the patient during wakeful and drowsy/sleep state(s).     DESCRIPTION OF THE RECORD:  During the wakefulness, the background showed a symmetrical 10 Hz alpha activity posteriorly with amplitude of 70 mV.  There was reactivity to eye closure/opening.  A normal anterior-posterior gradient was noted with faster beta frequencies seen anteriorly.  During drowsiness, theta/delta frequencies were seen.    During the sleep state, background shows diffuse high-amplitude 4-5 Hz delta activity.  Symmetrical high-amplitude sleep spindles and vertex sharps were seen in the leads over the central regions.     ACTIVATION PROCEDURES:   Intermittent Photic stimulation was performed in a stepwise fashion from 1 to 30 Hz and elicited a normal response (photic driving), most noticeable in the posterior leads.      ICTAL AND/OR INTERICTAL FINDINGS:   No focal or generalized epileptiform activity noted. No regional slowing was seen during this routine study.  No clinical events or seizures were reported or recorded during the study.     EKG: sampling of the EKG recording demonstrated sinus rhythm.     EVENTS:  None.     INTERPRETATION:  This is a normal video EEG recording in the awake, drowsy, and sleep state(s).  Clinical correlation is recommended.    Note: A normal EEG does not rule out epilepsy.  If the clinical suspicion remains high for seizures, a prolonged recording to capture clinical or subclinical events may be helpful.        Ed Linares MD    Epilepsy and Neurodiagnostics.   Clinical  of [facility].   Diplomate in Neurology, Epilepsy, and Electrodiagnostic Medicine.   Office: [phone]  Fax: [phone]

## 2018-05-30 NOTE — PROGRESS NOTES
Received bedside report from Evelina FIGUEROA. Pt upset at this time due to her headache. Informed pt that will page MD to see what other options we can get for the patient. Will continue to monitor and assess.

## 2018-05-30 NOTE — PROGRESS NOTES
MRI called for pt. Pt crying uncontrollable, states due to pain, at bedside. Will coordinate reschedule MRI with next dose of pain med.

## 2018-05-30 NOTE — PROGRESS NOTES
"Pt request to speak to charge nurse. Pt is upset with author since medication was, \"not pushed fast enough using the port closest to me\". Author explained to pt and  that it is not protocol to disconnect running IVF in order to use port closest pt pt r/t risk of port infection, educated pt  on importance on closed set.  "

## 2018-05-30 NOTE — PROGRESS NOTES
Internal Medicine Interval Note  Note Author: Benny Espino M.D.     Name Enedleia Pang     1970   Age/Sex 47 y.o. female   MRN [id number]   Code Status FULL      After 5PM or if no immediate response to page, please call for cross-coverage  Attending/Team: Dr. Newell/ETHAN See Patient List for primary contact information  Call [phone] to page    1st Call - Day Intern (R1):   Dr. Smith 2nd Call - Day Sr. Resident (R2/R3):   Dr. Espino        Reason for interval visit  (Principal Problem)   Complicated migraine    Interval Problem Daily Status Update  (24 hours)   Patient did state she was better this morning on my early rounds.  Dilaudid was stopped the last night.   After about 1 hr of seeing patient, she was very upset and crying, and that she was in pain, and was upset about her dilaudid being stopped.   I did explain to her we are planing discharge and it was necessary to start taking off meds to asses how the headaches improved.    MRI negative for intracranial pathology that will be responsible for worsening headaches.  EEG was done, -pending read    I called Dr Mahoney, who states it is okay to discharge patient on dilaudid for 7 days and get 1 week appointment to see him. He thinks because patients opiates were recently stopped that might be responsible to for worsening headaches(rebound).  Dilaudid resumed.     I talked to Dr Zhang-neurosurgery, and he will come check the  shunt today or early tomorrow morning prior to discharge for pt.    DMV form should be sent when patient is discharged-seizures    Review of Systems   Constitutional: Negative for chills, diaphoresis and fever.   HENT: Negative for congestion, ear pain, hearing loss, sore throat and tinnitus.    Eyes: Positive for photophobia. Negative for blurred vision and double vision.   Respiratory: Negative for cough, shortness of breath and wheezing.    Cardiovascular: Negative for chest pain and palpitations.    Gastrointestinal: Negative for heartburn, nausea and vomiting.   Genitourinary: Negative for dysuria and frequency.   Musculoskeletal: Negative for back pain and neck pain.   Skin: Negative for rash.   Neurological: Positive for headaches. Negative for dizziness, tingling, tremors, sensory change, speech change and seizures.   Endo/Heme/Allergies: Negative for polydipsia.   Psychiatric/Behavioral: Negative for depression and suicidal ideas.        Consultants/Specialty  Neurology    Disposition  Inpatient, and will be discharge to home, likely tomorrow    Quality-Core Measures   Reviewed items::  Labs reviewed, EKG reviewed, Medications reviewed and Radiology images reviewed  Lopez catheter::  No Lopez  DVT prophylaxis - mechanical:  SCDs       Physical Exam       Vitals:    05/29/18 1017 05/29/18 1600 05/29/18 1938 05/30/18 0800   BP: 107/72 114/70 142/98 113/65   Pulse: 89 92 (!) 121 84   Resp:  16 18 17   Temp:  36.8 °C (98.2 °F) 37.1 °C (98.8 °F) 36.3 °C (97.3 °F)   SpO2:  100% 95% 97%   Weight:   84 kg (185 lb 3 oz)    Height:         Body mass index is 32.8 kg/m². Weight: 84 kg (185 lb 3 oz)  Oxygen Therapy:  Pulse Oximetry: 97 %, O2 (LPM): 0, O2 Delivery: None (Room Air)    Physical Exam  General:female,  not in distress,   HEENT: Normocephalic, atraumatic, no jaundice or scleral icterus, no pallor, No cervical lymphadenopathy, no thyromegaly,    Respiratory:lungs clear to auscultation b/l, breath sounds vesicular, air entry adequate b/l,no wheezing, rales or crackles  Cardiac:Regular, rate and rhythm, S1/S2 present, no M/R/G  GI: abdomen non distended, soft, non tender, no organomegaly, bowel sounds normoactive  Neuro: AAOX4, horizontal nystagmus b/l  Psychiatry: Good judgment, good mood  Msk/Extremities: radial, dorsalis pedis pulses 2+b/l, no joint swelling or redness, ROM intact, no lower  extremity edema  Skin: No rash    Lab Data Review:         5/30/2018  2:45 PM    Recent Labs      05/28/18   0111   05/28/18   0857  05/29/18   0042   SODIUM  137  141  142   POTASSIUM  3.1*  4.3  4.0   CHLORIDE  104  107  110   CO2  23  25  24   BUN  8  6*  5*   CREATININE  0.57  0.48*  0.51   MAGNESIUM  1.6   --   2.2   PHOSPHORUS  3.1   --    --    CALCIUM  8.8  9.1  8.4*       Recent Labs      05/28/18   0111  05/28/18   0857  05/29/18 0042   ALTSGPT  17   --    --    ASTSGOT  9*   --    --    ALKPHOSPHAT  92   --    --    TBILIRUBIN  0.3   --    --    GLUCOSE  89  88  93       Recent Labs      05/28/18 0857   RBC  4.22   HEMOGLOBIN  13.5   HEMATOCRIT  39.8   PLATELETCT  352       Recent Labs      05/28/18 0111 05/28/18 0857   WBC   --   8.5   NEUTSPOLYS   --   79.40*   LYMPHOCYTES   --   14.00*   MONOCYTES   --   5.40   EOSINOPHILS   --   0.20   BASOPHILS   --   0.50   ASTSGOT  9*   --    ALTSGPT  17   --    ALKPHOSPHAT  92   --    TBILIRUBIN  0.3   --            Assessment/Plan     * Complicated migraine- (present on admission)   Assessment & Plan    - rebound symptoms of migraine after recent nerve stimulator removal vs  opioid discontinuation  -MRI brain not concerning for intracranial lesion responsible for heaadches  - will discharge on  keppra  - Start dilaudid prn,   -continue Toradol and Benadryl IV for migraine  - antiemetics PRN, IVF at 75 cc/hr, low dose ativan prn  - q4h neuro checks,   -seizure precautions  -will Follow up Dr Mahoney in 1 week after discharge  -will be discharged on DILAUDID X 7 DAYS           Substance dependence (HCC)- (present on admission)   Assessment & Plan    Patient seem to be dependent on opiates, and upset that her opiates discontinued  Will need pain management follow up 1 week after discharge        Electrolyte abnormality- (present on admission)   Assessment & Plan    Resolved         history of pseudotumor cerebri- (present on admission)   Assessment & Plan    - s/p  shunt 2017, unclear if still present or not  - Cranial nerve exam normal, visual fields intact  - MRI shows  no hydrocephalus        Depression- (present on admission)   Assessment & Plan    - continue home Cymbalta        Tachycardia- (present on admission)   Assessment & Plan      - Improved after IVF and pain medications, increased to 120s this am, but back up wnl  - continue monitoring         Rheumatoid arthritis(714.0)- (present on admission)   Assessment & Plan    - continue home Plaquenil and prednisone        Diarrhea- (present on admission)   Assessment & Plan    resolved

## 2018-05-30 NOTE — PROGRESS NOTES
Internal Medicine Interval Note  Note Author: Man Smith M.D.     Name Enedelia Pang     1970   Age/Sex 47 y.o. female   MRN [id number]   Code Status FULL     After 5PM or if no immediate response to page, please call for cross-coverage  Attending/Team: Dr. Newell/ETHAN See Patient List for primary contact information  Call [phone] to page    1st Call - Day Intern (R1):   Dr. Smith 2nd Call - Day Sr. Resident (R2/R3):   Dr. Espino         Reason for interval visit  (Principal Problem)   Complicated migraine    Interval Problem Daily Status Update  (24 hours)     Patient c/o continued headache overnight and this morning. Patient's pain treated with demerol x1, and started on dilaudid for pain control. Started on keppra by neurology for possible seizure. Will speak with neurology to see if they recommend EEG evaluation. MRI pending, neurosurgery to check  shunt functioning in place after MRI.       Review of Systems   Constitutional: Negative for chills, diaphoresis and fever.   HENT: Negative for congestion, hearing loss, sore throat and tinnitus.    Eyes: Positive for blurred vision and photophobia. Negative for double vision, pain, discharge and redness.   Respiratory: Negative for cough, sputum production and shortness of breath.    Cardiovascular: Negative for chest pain, palpitations and leg swelling.   Gastrointestinal: Positive for nausea. Negative for abdominal pain, blood in stool, constipation, diarrhea, melena and vomiting.   Genitourinary: Negative for dysuria and frequency.   Musculoskeletal: Positive for joint pain. Negative for back pain, falls, myalgias and neck pain.   Skin: Negative for itching and rash.   Neurological: Positive for focal weakness and headaches. Negative for dizziness, tingling, tremors, sensory change, speech change, seizures and loss of consciousness.   Endo/Heme/Allergies: Negative for polydipsia. Does not bruise/bleed easily.    Psychiatric/Behavioral: Negative for depression and substance abuse.       Consultants/Specialty  Neurology    Disposition  Inpatient    Quality Measures  Quality-Core Measures   Reviewed items::  Labs reviewed, EKG reviewed, Medications reviewed and Radiology images reviewed  Lopez catheter::  No Lopez  DVT prophylaxis - mechanical:  SCDs          Physical Exam       Vitals:    05/29/18 0302 05/29/18 0800 05/29/18 1017 05/29/18 1600   BP: 110/67 (!) 99/55 107/72 114/70   Pulse: 97 92 89 92   Resp: 16 16  16   Temp: 36.1 °C (97 °F) 36.4 °C (97.6 °F)  36.8 °C (98.2 °F)   SpO2: 98% 95%  100%   Weight:       Height:         Body mass index is 29.99 kg/m². Weight: 76.8 kg (169 lb 5 oz)  Oxygen Therapy:  Pulse Oximetry: 100 %, O2 (LPM): 4, O2 Delivery: Oxymask    Physical Exam   Constitutional: She is oriented to person, place, and time. She appears distressed.   HENT:   Head: Normocephalic and atraumatic.   Mouth/Throat: Oropharynx is clear and moist. No oropharyngeal exudate.   Eyes: Conjunctivae and EOM are normal. Right eye exhibits no discharge. Left eye exhibits no discharge. No scleral icterus.   Mild horizontal nystagmus, pupils dilated do not constrict past 3mm diameter   Neck: Normal range of motion. Neck supple.   Cardiovascular: Regular rhythm, normal heart sounds and intact distal pulses.  Exam reveals no gallop and no friction rub.    No murmur heard.  tachycardic   Pulmonary/Chest: Effort normal and breath sounds normal. No respiratory distress. She has no wheezes. She has no rales.   Abdominal: Soft. Bowel sounds are normal. She exhibits no distension. There is no tenderness.   Musculoskeletal: Normal range of motion. She exhibits no edema or tenderness.   Lymphadenopathy:     She has no cervical adenopathy.   Neurological: She is alert and oriented to person, place, and time. She has normal sensation, normal strength and intact cranial nerves.   No visual field defect, R patellor tendon mildly  hyperreflexive, Babinski negative   Skin: Skin is warm and dry. No rash noted. She is not diaphoretic. No erythema.   Psychiatric: Mood and judgment normal.         Lab Data Review:         5/28/2018  11:56 AM    Recent Labs      05/28/18 0111 05/28/18 0857  05/29/18   0042   SODIUM  137  141  142   POTASSIUM  3.1*  4.3  4.0   CHLORIDE  104  107  110   CO2  23  25  24   BUN  8  6*  5*   CREATININE  0.57  0.48*  0.51   MAGNESIUM  1.6   --   2.2   PHOSPHORUS  3.1   --    --    CALCIUM  8.8  9.1  8.4*       Recent Labs      05/28/18   0111 05/28/18   0857  05/29/18   0042   ALTSGPT  17   --    --    ASTSGOT  9*   --    --    ALKPHOSPHAT  92   --    --    TBILIRUBIN  0.3   --    --    GLUCOSE  89  88  93       Recent Labs      05/28/18 0857   RBC  4.22   HEMOGLOBIN  13.5   HEMATOCRIT  39.8   PLATELETCT  352       Recent Labs      05/28/18 0111 05/28/18   0857   WBC   --   8.5   NEUTSPOLYS   --   79.40*   LYMPHOCYTES   --   14.00*   MONOCYTES   --   5.40   EOSINOPHILS   --   0.20   BASOPHILS   --   0.50   ASTSGOT  9*   --    ALTSGPT  17   --    ALKPHOSPHAT  92   --    TBILIRUBIN  0.3   --            Assessment/Plan     * Complicated migraine- (present on admission)   Assessment & Plan    - rebound symptoms of migraine after recent nerve stimulator removal? also consider idiopathic intracranial hypertension, vs less likely stroke or seizure, other encephalopathy  - MRI brain with contrast ordered  - Neurology consulted, started on keppra for possible seizure  - Start dilaudid prn, continue Toradol and Benadryl IV for migraine (on IM for both at home)  - antiemetics PRN, IVF at 75 cc/hr, low dose ativan prn  - q4h neuro checks, seizure precautions        Electrolyte abnormality- (present on admission)   Assessment & Plan    - 2 weeks of diarrhea, up to 5 times a day; patient states diarrhea stopped a week ago  - Replete K and Mg  - s/p K 60 mEq, Mg 2 gm  - EKG, telemetry, trend chem, replete PRN        history  of pseudotumor cerebri- (present on admission)   Assessment & Plan    - s/p  shunt 2017, unclear if still present or not  - Cranial nerve exam normal, visual fields intact  - Will obtain MRI brain with contrast, neurosurgery to check  function and placement after MRI        Depression- (present on admission)   Assessment & Plan    - continue home Cymbalta        Tachycardia- (present on admission)   Assessment & Plan    - up to 130s in ED, appeared regular rhythm on telemetry  - Improved after IVF and pain medications  - no chest pain or dyspnea, denies cardiac history  - trend and replete electrolytes, EKG, telemetry        Rheumatoid arthritis(714.0)- (present on admission)   Assessment & Plan    - continue home Plaquenil and prednisone        Diarrhea- (present on admission)   Assessment & Plan    - per history, but did not mention as a complaint, found only on ROS; pt states later her diarrhea resolved a week ago  - IVF, trend and replete electrolytes, consider stool studies if has diarrhea this course

## 2018-05-30 NOTE — PROCEDURES
VIDEO ELECTROENCEPHALOGRAM REPORT        Referring MD: Dr. Espino.      DOS:  5/30/2018 (total recording of 32 minutes).      INDICATION:  Enedelia Pang 47 y.o. female presenting with headaches.      CURRENT ANTIEPILEPTIC REGIMEN: Levetiracetam, Gabapentin, Lorazepam.      TECHNIQUE: 30 channel video electroencephalogram (EEG) was performed in accordance with the international 10-20 system. The study was reviewed in bipolar and referential montages. The recording examined the patient during wakeful and drowsy/sleep state(s).      DESCRIPTION OF THE RECORD:  During the wakefulness, the background showed a symmetrical 10 Hz alpha activity posteriorly with amplitude of 70 mV.  There was reactivity to eye closure/opening.  A normal anterior-posterior gradient was noted with faster beta frequencies seen anteriorly.  During drowsiness, theta/delta frequencies were seen.     During the sleep state, background shows diffuse high-amplitude 4-5 Hz delta activity.  Symmetrical high-amplitude sleep spindles and vertex sharps were seen in the leads over the central regions.      ACTIVATION PROCEDURES:   Intermittent Photic stimulation was performed in a stepwise fashion from 1 to 30 Hz and elicited a normal response (photic driving), most noticeable in the posterior leads.        ICTAL AND/OR INTERICTAL FINDINGS:   No focal or generalized epileptiform activity noted. No regional slowing was seen during this routine study.  No clinical events or seizures were reported or recorded during the study.      EKG: sampling of the EKG recording demonstrated sinus rhythm.      EVENTS:  None.      INTERPRETATION:  This is a normal video EEG recording in the awake, drowsy, and sleep state(s).  Clinical correlation is recommended.     Note: A normal EEG does not rule out epilepsy.  If the clinical suspicion remains high for seizures, a prolonged recording to capture clinical or subclinical events may be helpful.           Ed Linares  MD   Epilepsy and Neurodiagnostics.   Clinical  of Neurology [facility].   Diplomate in Neurology, Epilepsy, and Electrodiagnostic Medicine.   Office: [phone]  Fax: [phone]    AJAY KNIGHT    DD:  05/30/2018 15:43:59  DT:  05/30/2018 15:49:53    D#:  [id number]  Job#:  [id number]

## 2018-05-30 NOTE — PROGRESS NOTES
MD talking to pt and  at bedside, discussed plan of care, and discussed plan on how to control pain.

## 2018-05-30 NOTE — PROGRESS NOTES
Received bedside report from NOC RN, Pt assessed, A&Ox4, Vitals stable, pt has 5/10 head pain, she said is tolerable at the moment, requesting her toradol and benadryl right at 0730. No SOB Noted, however has some O2 on via mask, pt states this helps her headaches.  Pt updated on plan of care, Call light within reach, personal belongings within reach, bed is locked and in lowest position.  Pt ambulates via upself. Tele monitor on. Will continue to monitor. Hourly rounding in place.

## 2018-05-31 VITALS
HEIGHT: 63 IN | OXYGEN SATURATION: 93 % | HEART RATE: 94 BPM | BODY MASS INDEX: 31.95 KG/M2 | SYSTOLIC BLOOD PRESSURE: 98 MMHG | TEMPERATURE: 98.6 F | DIASTOLIC BLOOD PRESSURE: 70 MMHG | RESPIRATION RATE: 16 BRPM | WEIGHT: 180.34 LBS

## 2018-05-31 LAB
ANION GAP SERPL CALC-SCNC: 5 MMOL/L (ref 0–11.9)
BUN SERPL-MCNC: 7 MG/DL (ref 8–22)
CALCIUM SERPL-MCNC: 8.5 MG/DL (ref 8.5–10.5)
CHLORIDE SERPL-SCNC: 108 MMOL/L (ref 96–112)
CO2 SERPL-SCNC: 28 MMOL/L (ref 20–33)
CREAT SERPL-MCNC: 0.5 MG/DL (ref 0.5–1.4)
GLUCOSE SERPL-MCNC: 88 MG/DL (ref 65–99)
POTASSIUM SERPL-SCNC: 3.8 MMOL/L (ref 3.6–5.5)
SODIUM SERPL-SCNC: 141 MMOL/L (ref 135–145)

## 2018-05-31 PROCEDURE — 700111 HCHG RX REV CODE 636 W/ 250 OVERRIDE (IP): Performed by: HOSPITALIST

## 2018-05-31 PROCEDURE — A9270 NON-COVERED ITEM OR SERVICE: HCPCS | Performed by: STUDENT IN AN ORGANIZED HEALTH CARE EDUCATION/TRAINING PROGRAM

## 2018-05-31 PROCEDURE — 700111 HCHG RX REV CODE 636 W/ 250 OVERRIDE (IP): Performed by: STUDENT IN AN ORGANIZED HEALTH CARE EDUCATION/TRAINING PROGRAM

## 2018-05-31 PROCEDURE — 700102 HCHG RX REV CODE 250 W/ 637 OVERRIDE(OP): Performed by: HOSPITALIST

## 2018-05-31 PROCEDURE — 700102 HCHG RX REV CODE 250 W/ 637 OVERRIDE(OP): Performed by: STUDENT IN AN ORGANIZED HEALTH CARE EDUCATION/TRAINING PROGRAM

## 2018-05-31 PROCEDURE — 80048 BASIC METABOLIC PNL TOTAL CA: CPT

## 2018-05-31 PROCEDURE — 99239 HOSP IP/OBS DSCHRG MGMT >30: CPT | Mod: GC | Performed by: HOSPITALIST

## 2018-05-31 PROCEDURE — A9270 NON-COVERED ITEM OR SERVICE: HCPCS | Performed by: HOSPITALIST

## 2018-05-31 RX ORDER — GINSENG 100 MG
1 CAPSULE ORAL 2 TIMES DAILY
Qty: 1 TUBE | Refills: 0 | Status: SHIPPED | OUTPATIENT
Start: 2018-05-31 | End: 2018-06-13

## 2018-05-31 RX ORDER — LEVETIRACETAM 1000 MG/1
1000 TABLET ORAL 2 TIMES DAILY
Qty: 60 TAB | Refills: 0 | Status: SHIPPED | OUTPATIENT
Start: 2018-05-31 | End: 2018-06-30

## 2018-05-31 RX ORDER — BACITRACIN ZINC 500 [USP'U]/G
OINTMENT TOPICAL 2 TIMES DAILY
Status: DISCONTINUED | OUTPATIENT
Start: 2018-05-31 | End: 2018-05-31 | Stop reason: HOSPADM

## 2018-05-31 RX ORDER — TEMAZEPAM 15 MG/1
15 CAPSULE ORAL
Qty: 14 CAP | Refills: 0 | Status: SHIPPED | OUTPATIENT
Start: 2018-05-31 | End: 2018-06-14

## 2018-05-31 RX ORDER — HYDROMORPHONE HYDROCHLORIDE 2 MG/1
1 TABLET ORAL EVERY 4 HOURS PRN
Qty: 16 TAB | Refills: 0 | Status: SHIPPED | OUTPATIENT
Start: 2018-05-31 | End: 2018-06-06

## 2018-05-31 RX ADMIN — KETOROLAC TROMETHAMINE 15 MG: 30 INJECTION, SOLUTION INTRAMUSCULAR at 03:43

## 2018-05-31 RX ADMIN — STANDARDIZED SENNA CONCENTRATE AND DOCUSATE SODIUM 2 TABLET: 8.6; 5 TABLET, FILM COATED ORAL at 05:09

## 2018-05-31 RX ADMIN — HYDROMORPHONE HYDROCHLORIDE 1 MG: 10 INJECTION, SOLUTION INTRAMUSCULAR; INTRAVENOUS; SUBCUTANEOUS at 14:22

## 2018-05-31 RX ADMIN — BACITRACIN ZINC: 500 OINTMENT TOPICAL at 14:23

## 2018-05-31 RX ADMIN — ASPIRIN 81 MG: 81 TABLET, CHEWABLE ORAL at 05:08

## 2018-05-31 RX ADMIN — DIPHENHYDRAMINE HYDROCHLORIDE 50 MG: 50 INJECTION, SOLUTION INTRAMUSCULAR; INTRAVENOUS at 11:52

## 2018-05-31 RX ADMIN — DULOXETINE HYDROCHLORIDE 60 MG: 60 CAPSULE, DELAYED RELEASE ORAL at 05:08

## 2018-05-31 RX ADMIN — CALCIUM CARBONATE-CHOLECALCIFEROL TAB 250 MG-125 UNIT 1 TABLET: 250-125 TAB at 05:08

## 2018-05-31 RX ADMIN — RISPERIDONE 1 MG: 1 TABLET, FILM COATED ORAL at 05:09

## 2018-05-31 RX ADMIN — OMEPRAZOLE 20 MG: 20 CAPSULE, DELAYED RELEASE ORAL at 05:08

## 2018-05-31 RX ADMIN — HYDROMORPHONE HYDROCHLORIDE 1 MG: 10 INJECTION, SOLUTION INTRAMUSCULAR; INTRAVENOUS; SUBCUTANEOUS at 09:16

## 2018-05-31 RX ADMIN — LEVETIRACETAM 1000 MG: 500 TABLET ORAL at 05:08

## 2018-05-31 RX ADMIN — HYDROMORPHONE HYDROCHLORIDE 1 MG: 2 INJECTION INTRAMUSCULAR; INTRAVENOUS; SUBCUTANEOUS at 05:08

## 2018-05-31 RX ADMIN — HYDROXYCHLOROQUINE SULFATE 300 MG: 200 TABLET, FILM COATED ORAL at 05:36

## 2018-05-31 RX ADMIN — DIPHENHYDRAMINE HYDROCHLORIDE 50 MG: 50 INJECTION, SOLUTION INTRAMUSCULAR; INTRAVENOUS at 03:42

## 2018-05-31 RX ADMIN — GABAPENTIN 600 MG: 300 CAPSULE ORAL at 05:08

## 2018-05-31 RX ADMIN — KETOROLAC TROMETHAMINE 15 MG: 30 INJECTION, SOLUTION INTRAMUSCULAR at 11:52

## 2018-05-31 RX ADMIN — PREDNISONE 5 MG: 5 TABLET ORAL at 05:08

## 2018-05-31 RX ADMIN — SODIUM CHLORIDE, PRESERVATIVE FREE 500 UNITS: 5 INJECTION INTRAVENOUS at 14:43

## 2018-05-31 ASSESSMENT — PATIENT HEALTH QUESTIONNAIRE - PHQ9
6. FEELING BAD ABOUT YOURSELF - OR THAT YOU ARE A FAILURE OR HAVE LET YOURSELF OR YOUR FAMILY DOWN: NEARLY EVERY DAY
SUM OF ALL RESPONSES TO PHQ9 QUESTIONS 1 AND 2: 3
9. THOUGHTS THAT YOU WOULD BE BETTER OFF DEAD, OR OF HURTING YOURSELF: NOT AT ALL
SUM OF ALL RESPONSES TO PHQ QUESTIONS 1-9: 13
7. TROUBLE CONCENTRATING ON THINGS, SUCH AS READING THE NEWSPAPER OR WATCHING TELEVISION: SEVERAL DAYS
4. FEELING TIRED OR HAVING LITTLE ENERGY: MORE THAN HALF THE DAYS
5. POOR APPETITE OR OVEREATING: MORE THAN HALF THE DAYS
3. TROUBLE FALLING OR STAYING ASLEEP OR SLEEPING TOO MUCH: SEVERAL DAYS
2. FEELING DOWN, DEPRESSED, IRRITABLE, OR HOPELESS: NEARLY EVERY DAY
1. LITTLE INTEREST OR PLEASURE IN DOING THINGS: NOT AT ALL
8. MOVING OR SPEAKING SO SLOWLY THAT OTHER PEOPLE COULD HAVE NOTICED. OR THE OPPOSITE, BEING SO FIGETY OR RESTLESS THAT YOU HAVE BEEN MOVING AROUND A LOT MORE THAN USUAL: SEVERAL DAYS

## 2018-05-31 ASSESSMENT — PAIN SCALES - GENERAL: PAINLEVEL_OUTOF10: 8

## 2018-05-31 NOTE — ASSESSMENT & PLAN NOTE
- MRI brain not concerning for intracranial lesion  -  shunt placement checked after MRI by neurosurgery, currently in place

## 2018-05-31 NOTE — PROGRESS NOTES
2 RN skin check - open sores noted on palm of RUE and scabbed over wound on plantar side of LLE, per pt wounds present PTA, wound photos taken and uploaded, wound consult placed

## 2018-05-31 NOTE — DISCHARGE PLANNING
Anticipated Discharge Disposition: Patient transferred to the Neuro floor.    Action: Received a fax from [email or web address] about this patient and an appeal of discharge.  Faxed to Neuro floor.    Barriers to Discharge: Patient no longer on this RN CM's floor.    Plan: Unknown to me, patient on another floor.

## 2018-05-31 NOTE — CARE PLAN
Problem: Safety  Goal: Will remain free from falls    Intervention: Assess risk factors for falls  Pt has no slip socks on. Instructed to use call light and verbalizes understanding.

## 2018-05-31 NOTE — DISCHARGE INSTRUCTIONS
Discharge Instructions    Discharged to home by car with relative. Discharged via wheelchair, hospital escort: Yes.  Special equipment needed: Not Applicable    Be sure to schedule a follow-up appointment with your primary care doctor or any specialists as instructed.     Discharge Plan:   Diet Plan: Discussed  Activity Level: Discussed  Confirmed Follow up Appointment: Appointment Scheduled  Confirmed Symptoms Management: Discussed  Medication Reconciliation Updated: Yes  Influenza Vaccine Indication: Not indicated: Previously immunized this influenza season and > 8 years of age    I understand that a diet low in cholesterol, fat, and sodium is recommended for good health. Unless I have been given specific instructions below for another diet, I accept this instruction as my diet prescription.   Other diet: Regular    Special Instructions: None    · Is patient discharged on Warfarin / Coumadin?   No     Depression / Suicide Risk    As you are discharged from this [facility] facility, it is important to learn how to keep safe from harming yourself.    Recognize the warning signs:  · Abrupt changes in personality, positive or negative- including increase in energy   · Giving away possessions  · Change in eating patterns- significant weight changes-  positive or negative  · Change in sleeping patterns- unable to sleep or sleeping all the time   · Unwillingness or inability to communicate  · Depression  · Unusual sadness, discouragement and loneliness  · Talk of wanting to die  · Neglect of personal appearance   · Rebelliousness- reckless behavior  · Withdrawal from people/activities they love  · Confusion- inability to concentrate     If you or a loved one observes any of these behaviors or has concerns about self-harm, here's what you can do:  · Talk about it- your feelings and reasons for harming yourself  · Remove any means that you might use to hurt yourself (examples: pills, rope, extension cords,  firearm)  · Get professional help from the community (Mental Health, Substance Abuse, psychological counseling)  · Do not be alone:Call your Safe Contact- someone whom you trust who will be there for you.  · Call your local CRISIS HOTLINE 607-6292 or 191-799-8988  · Call your local Children's Mobile Crisis Response Team [place] (036) 194-5599 or www.Riskonnect  · Call the toll free National Suicide Prevention Hotlines   · National Suicide Prevention Lifeline 420-731-FDBP (2231)  · National Hope Line Network 800-SUICIDE (541-9127)

## 2018-05-31 NOTE — PROGRESS NOTES
[id number] Pt arrived on floor with all her belongings, edu pt to use call light for assistance, bed alarm on, call light and personal belongings within reach

## 2018-05-31 NOTE — PROGRESS NOTES
Patient discharged with . VSS. Power Port de- accessed via Sas(h) protocol. Discharge instructions were discussed thoroughly including changes to medication. Pain controlled at discharge.

## 2018-05-31 NOTE — PROGRESS NOTES
Called report to [facility] on Neuro floor. Pt being transferred to [id number] this am. All questions answered by nurse. All belongings packed up with pt.  at bedside informed as well.

## 2018-05-31 NOTE — WOUND TEAM
Wound consult placed for evaluation of dorsum right hand and left foot wounds.  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  Currently wounds are dry and intact and she declines any wound care at this time which seems appropriate.  She will follow up at [facility] post discharge.

## 2018-05-31 NOTE — ASSESSMENT & PLAN NOTE
- Follow up with pain specialist Dr. Mahoney 6/2; will discharge on dilaudid, Dr. Mahoney agreeable

## 2018-05-31 NOTE — DISCHARGE SUMMARY
Internal Medicine Discharge Summary  Note Author: Man Smith M.D.       Admit Date:  5/28/2018       Discharge Date:   5/31/2018    Service:   [facility] Internal Medicine Yellow Team  Attending Physician(s):   Dr. Newell       Senior Resident(s):   Dr. Espino  Gerard Resident(s):   Dr. Smith      Primary Diagnosis:   Complicated migraine    Secondary Diagnoses:                Principal Problem:    Complicated migraine POA: Yes  Active Problems:    Substance dependence (HCC) POA: Yes    S/P  shunt (Chronic) POA: Yes    Depression POA: Yes    Chronic pain disorder (Chronic) POA: Yes    history of pseudotumor cerebri (Chronic) POA: Yes    Opiate withdrawal (HCC) POA: Yes    Electrolyte abnormality POA: Yes    H/O: CVA (cerebrovascular accident) (Chronic) POA: Yes    Diarrhea POA: Yes    Rheumatoid arthritis(714.0) POA: Yes    Tachycardia POA: Yes  Resolved Problems:    * No resolved hospital problems. *      Hospital Summary (Brief Narrative):       Ms. Pang is a 47 year old female with past medical history migraines status post recent removal of occipital nerve stimulator, idiopathic intracranial hypertension status post ventriculoperitoneal shunt 2017, strokes, depression, presenting for headache. Patient reported worsening headache for the past few weeks, associated with head throbbing, nausea, emesis, photophobia, blurry vision, and confusion. Patient had occipital nerve stimulator removed a few months ago for scheduled MRI of brain to evaluate for pituitary tumor; patient also had opiod pain medications discontinued recently per her pain specialist Dr. Mahoney. Patient reported headache that was similar to her chronic migraine headaches except it was no longer controlled on home medications benadryl and toradol. Patient had MRI brain performed which did not show any intracranial abnormality to account for headache.  shunt placement also checked after MRI by neurosurgery who state  shunt is in correct  position. Neurology consulted who started patient on keppra for possible seizure. EEG was performed which did not show any seizure activity. Patient was started on dilaudid for pain control, which adequately controlled patient's pain. Discussed pain management with patient's pain specialist who stated patient may be having opiate withdrawal and suggested continuing dilaudid until reassessed after discharge. Patient was agreeable to discharge on dilaudid and keppra with follow up with pain specialist and neurology. Patient is therefore discharged in stable condition to home with close outpatient follow up.      Patient /Hospital Summary (Details -- Problem Oriented) :          Substance dependence (HCC)   Assessment & Plan    Patient seem to be dependent on opiates, and upset that her opiates discontinued  Will need pain management follow up 1 week after discharge        * Complicated migraine   Assessment & Plan    - Likely rebound symptoms of migraine after recent nerve stimulator removal vs opioid discontinuation  - MRI brain not concerning for intracranial lesion responsible for headaches  - EEG does not show seizure activity, neurology recommends continued keppra  - continue dilaudid, toradol and benadryl  - Patient to follow up with pain specialist Dr Mahoney next week 6/2; Dr. Mahoney agreeable to discharge on short course of dilaudid  - Patient to follow up with neurology Dr. Ribera next wek 6/8, continue keppra; DMV form sent          Electrolyte abnormality   Assessment & Plan    Resolved         history of pseudotumor cerebri   Assessment & Plan    - s/p  shunt 2017, unclear if still present or not  - Cranial nerve exam normal, visual fields intact  - MRI shows no hydrocephalus        Chronic pain disorder   Assessment & Plan    - Follow up with pain specialist Dr. Mahoney 6/2; will discharge on dilaudid, Dr. Mahoney agreeable        Depression   Assessment & Plan    - continue home Cymbalta        S/P  shunt    Assessment & Plan    - MRI brain not concerning for intracranial lesion  -  shunt placement checked after MRI by neurosurgery, currently in place        Tachycardia   Assessment & Plan    - Improved after IVF and pain medications  - continue monitoring         Rheumatoid arthritis(714.0)   Assessment & Plan    - continue home Plaquenil and prednisone        Diarrhea   Assessment & Plan    resolved            Consultants:     Neurology    Procedures:        2018 Video Electroencephalogram    Imaging/ Testin/28/2018 Skull x ray  2018 Chest x ray  2018 Abdomen x ray  2018 MRI brain with and without contrast      Discharge Medications:         Medication Reconciliation: Completed       Medication List      START taking these medications      Instructions   bacitracin 500 UNIT/GM ointment   Apply 1 Each to affected area(s) 2 times a day.  Dose:  1 Each     HYDROmorphone 2 MG Tabs  Commonly known as:  DILAUDID   Take 0.5 Tabs by mouth every four hours as needed for Severe Pain for up to 6 days.  Dose:  1 mg     levetiracetam 1000 MG tablet  Commonly known as:  KEPPRA   Take 1 Tab by mouth 2 Times a Day for 30 days.  Dose:  1000 mg     temazepam 15 MG Caps  Commonly known as:  RESTORIL   Take 1 Cap by mouth at bedtime as needed for up to 14 days.  Dose:  15 mg        CONTINUE taking these medications      Instructions   aspirin 81 MG Chew chewable tablet  Commonly known as:  ASA   Take 81 mg by mouth every morning.  Dose:  81 mg     CALCIUM + D PO   Take 1 Tab by mouth every morning.  Dose:  1 Tab     diphenhydrAMINE 50 MG/ML Soln  Commonly known as:  BENADRYL   50 mg by Intramuscular route every 6 hours as needed. With Toradol for migraines  Dose:  50 mg     DULoxetine 60 MG Cpep delayed-release capsule  Commonly known as:  CYMBALTA   Take 60 mg by mouth 2 times a day.  Dose:  60 mg     gabapentin 600 MG tablet  Commonly known as:  NEURONTIN   Take 600 mg by mouth 3 times a day.  Dose:   600 mg     hydroxychloroquine 200 MG Tabs  Commonly known as:  PLAQUENIL   Take 1.5 Tabs by mouth every day.  Dose:  300 mg     ketorolac 60 MG/2ML Soln  Commonly known as:  TORADOL   60 mg by Intramuscular route every 6 hours as needed. With Benadryl for migraines  Dose:  60 mg     omeprazole 20 MG delayed-release capsule  Commonly known as:  PRILOSEC   Take 1 Cap by mouth every day.  Dose:  20 mg     predniSONE 5 MG Tabs  Commonly known as:  DELTASONE   Take 5 mg by mouth every morning.  Dose:  5 mg     risperiDONE 0.5 MG Tabs  Commonly known as:  RISPERDAL   Take 1 mg by mouth 2 times a day.  Dose:  1 mg            Can use .DISCHARGEMEDSLIST if going to another facility         Disposition:   Discharge to home    Diet:   Regular    Activity:   As tolerated, cannot operate a vehicle    Instructions:         The patient was instructed to return to the ER in the event of worsening symptoms. I have counseled the patient on the importance of compliance and the patient has agreed to proceed with all medical recommendations and follow up plan indicated above.   The patient understands that all medications come with benefits and risks. Risks may include permanent injury or death and these risks can be minimized with close reassessment and monitoring.        Primary Care Provider:    Elliott Power MD  Discharge summary faxed to primary care provider:  Deferred  Copy of discharge summary given to the patient: Deferred      Follow up appointment details :      Follow up with pain specialist Dr Mahoney 6/2/2018  Follow up with neurology Dr. Ribera 6/8/2018  Follow up with PCP in next 1-2 weeks    Pending Studies:        none    Time spent on discharge day patient visit, preparing discharge paperwork and arranging for patient follow up.    Summary of follow up issues:   Take antiseizure and pain medications as prescribed. Follow up with pain specialist and neurology.    Discharge Time (Minutes) :    45 minutes  Hospital  Course Type:  Inpatient Stay >2 midnights      Condition on Discharge    ______________________________________________________________________    Interval history/exam for day of discharge:     No acute events or seizure activity overnight. Patient with adequately controlled headache pain on dilaudid. Will discharge to home on short course dilaudid with close outpatient follow up. Patient agreeable.    Vitals:    05/30/18 1600 05/30/18 2035 05/31/18 0443 05/31/18 0800   BP: 133/78 128/87 111/66 (!) 98/70   Pulse: 95 (!) 104 94 94   Resp: 18 16 18 16   Temp: 36.9 °C (98.5 °F) 36.4 °C (97.6 °F) 36.6 °C (97.8 °F) 37 °C (98.6 °F)   SpO2: 95% 93% 99% 93%   Weight:  81.8 kg (180 lb 5.4 oz)     Height:         Weight/BMI: Body mass index is 31.95 kg/m².  Pulse Oximetry: 93 %, O2 (LPM): 0, O2 Delivery: None (Room Air)    General:  woman sitting in bed in NAD  CVS: RRR, no m/r/g  PULM: CTA b/l, no wheezes or crackles  NEURO: A+Ox4, CNs intact, strength 5/5 throughout, sensation normal throughout    Most Recent Labs:    Lab Results   Component Value Date/Time    WBC 8.5 05/28/2018 08:57 AM    RBC 4.22 05/28/2018 08:57 AM    HEMOGLOBIN 13.5 05/28/2018 08:57 AM    HEMATOCRIT 39.8 05/28/2018 08:57 AM    MCV 94.3 05/28/2018 08:57 AM    MCH 32.0 05/28/2018 08:57 AM    MCHC 33.9 05/28/2018 08:57 AM    MPV 10.0 05/28/2018 08:57 AM    NEUTSPOLYS 79.40 (H) 05/28/2018 08:57 AM    LYMPHOCYTES 14.00 (L) 05/28/2018 08:57 AM    MONOCYTES 5.40 05/28/2018 08:57 AM    EOSINOPHILS 0.20 05/28/2018 08:57 AM    BASOPHILS 0.50 05/28/2018 08:57 AM    HYPOCHROMIA 1+ 01/11/2017 02:29 PM    ANISOCYTOSIS 1+ 01/11/2017 02:29 PM      Lab Results   Component Value Date/Time    SODIUM 141 05/31/2018 05:12 AM    POTASSIUM 3.8 05/31/2018 05:12 AM    CHLORIDE 108 05/31/2018 05:12 AM    CO2 28 05/31/2018 05:12 AM    GLUCOSE 88 05/31/2018 05:12 AM    BUN 7 (L) 05/31/2018 05:12 AM    CREATININE 0.50 05/31/2018 05:12 AM      Lab Results   Component  Value Date/Time    ALTSGPT 17 05/28/2018 01:11 AM    ASTSGOT 9 (L) 05/28/2018 01:11 AM    ALKPHOSPHAT 92 05/28/2018 01:11 AM    TBILIRUBIN 0.3 05/28/2018 01:11 AM    DBILIRUBIN <0.1 04/12/2018 03:14 AM    LIPASE 17 10/12/2017 12:59 AM    ALBUMIN 4.2 05/28/2018 01:11 AM    ALBUMIN 4.69 11/06/2017 10:23 AM    GLOBULIN 2.4 05/28/2018 01:11 AM    INR 1.12 11/18/2017 04:30 AM    MACROCYTOSIS 1+ 08/30/2015 04:10 PM     Lab Results   Component Value Date/Time    PROTHROMBTM 14.1 11/18/2017 04:30 AM    INR 1.12 11/18/2017 04:30 AM

## 2018-05-31 NOTE — PROGRESS NOTES
Received bedside report from Thao FIGUEROA. Pt is lying in bed ice packs to head. Denies any needs at this time. Will continue to monitor and assess.

## 2018-05-31 NOTE — DISCHARGE PLANNING
Medical Social Work    Anticipated Discharge Disposition: Home     Action: LSW met with pt at bedside to go over IMM. Pt was provided copy and signed copy for chart.     Barriers to Discharge: None      Plan: anticipating pt to d/c home today or tomorrow

## 2018-06-05 ENCOUNTER — TELEPHONE (OUTPATIENT)
Dept: NEUROLOGY | Facility: MEDICAL CENTER | Age: 48
End: 2018-06-05

## 2018-06-05 NOTE — TELEPHONE ENCOUNTER
Drew Ribera M.D.  Carmina Mann, Med Ass't   Caller: Unspecified (Today,  8:00 AM)             Of course, no need to notify us on this     Spoke to Leida in scheduling ok to cancel.

## 2018-06-05 NOTE — TELEPHONE ENCOUNTER
Imaging called patient was inpatient on 05/28/2018 and had  MRI of brain, she has one scheduled for tomorrow. Can they cancel appointment.    Please advise

## 2018-06-06 ENCOUNTER — APPOINTMENT (OUTPATIENT)
Dept: RADIOLOGY | Facility: MEDICAL CENTER | Age: 48
End: 2018-06-06
Attending: PSYCHIATRY & NEUROLOGY
Payer: MEDICARE

## 2018-06-06 ENCOUNTER — HOSPITAL ENCOUNTER (OUTPATIENT)
Dept: RADIOLOGY | Facility: MEDICAL CENTER | Age: 48
End: 2018-06-06
Attending: INTERNAL MEDICINE
Payer: MEDICARE

## 2018-06-06 DIAGNOSIS — M79.89 SWELLING OF LEFT HAND: ICD-10-CM

## 2018-06-06 DIAGNOSIS — M79.89 SWELLING OF RIGHT HAND: ICD-10-CM

## 2018-06-06 PROCEDURE — 73218 MRI UPPER EXTREMITY W/O DYE: CPT | Mod: LT

## 2018-06-06 PROCEDURE — 73221 MRI JOINT UPR EXTREM W/O DYE: CPT | Mod: LT

## 2018-06-06 PROCEDURE — 73221 MRI JOINT UPR EXTREM W/O DYE: CPT | Mod: RT

## 2018-06-08 ENCOUNTER — OFFICE VISIT (OUTPATIENT)
Dept: NEUROLOGY | Facility: MEDICAL CENTER | Age: 48
End: 2018-06-08
Payer: MEDICARE

## 2018-06-08 ENCOUNTER — APPOINTMENT (OUTPATIENT)
Dept: RHEUMATOLOGY | Facility: PHYSICIAN GROUP | Age: 48
End: 2018-06-08
Payer: MEDICARE

## 2018-06-08 VITALS
SYSTOLIC BLOOD PRESSURE: 114 MMHG | BODY MASS INDEX: 30.65 KG/M2 | RESPIRATION RATE: 18 BRPM | HEART RATE: 120 BPM | WEIGHT: 166.56 LBS | OXYGEN SATURATION: 99 % | DIASTOLIC BLOOD PRESSURE: 72 MMHG | TEMPERATURE: 98.3 F | HEIGHT: 62 IN

## 2018-06-08 DIAGNOSIS — M35.9 AUTOIMMUNE CEREBRITIS (HCC): ICD-10-CM

## 2018-06-08 DIAGNOSIS — G05.3 AUTOIMMUNE CEREBRITIS (HCC): ICD-10-CM

## 2018-06-08 DIAGNOSIS — R42 SPELL OF DIZZINESS: ICD-10-CM

## 2018-06-08 DIAGNOSIS — Z98.2 S/P VP SHUNT: Chronic | ICD-10-CM

## 2018-06-08 DIAGNOSIS — G43.109 COMPLICATED MIGRAINE: ICD-10-CM

## 2018-06-08 DIAGNOSIS — R79.1 ABNORMAL COAGULATION PROFILE: ICD-10-CM

## 2018-06-08 PROCEDURE — 99214 OFFICE O/P EST MOD 30 MIN: CPT | Performed by: PSYCHIATRY & NEUROLOGY

## 2018-06-08 NOTE — PROGRESS NOTES
NEUROLOGY NOTE    Referring Physician  Elliott Power M.D.      CHIEF COMPLAINT:  Spells of flashing light and shivering of eyelids  Chief Complaint   Patient presents with   • Hospital Follow-up     S/P shunt       PRESENT ILLNESS:     The patient is a 46-year-old right-handed female   with longstanding history of migrainous headache who has failed several   preventive treatment in the past, presented again for evaluation of severe   right-sided headache.  Apparently earlier this month she was admitted for   intractable headache and was discharged to have followup with neurology   clinic; however, she has not seen a neurologist as of yet.  She was discharged   on 05/07/2017.  As mentioned, she had a long-standing history of migraine   headache.  At one point, she was on Topamax for headache prevention, which   failed to improve her headache.  She has tried Depakote, but apparently had   some drug-induced hepatitis with elevated liver enzymes and this was also   discontinued.  She has tried OxyContin for headache   relief.  At some point, she was seen by Dr. Dillan Handley who performs Botox   injection.  Patient also tells me she has increased intracranial pressure for   which she was placed on Diamox.      She had occipital nerve stimulator, which was placed   apparently in [place], [place].  She also tells me she had pituitary tumor or   mass, but due to her occipital stimulator, we are not able to obtain brain   MRI.      She says her physician told her to follow up on that in 10 years.    Apparently, they felt this is a benign mass.  As far as her headache   treatment, it appears she has responded to Benadryl and Toradol injection in   the past.  She also takes prednisone 10 mg daily that is likely due to her   rheumatological problem that she has.  She had a brain CT 2 days ago which   revealed no evidence of acute intracranial abnormalities.  Her lab work   reveals elevated liver enzymes with AST of 239  and ALT of 574 with increase   alkaline phosphatase of 174.  Her current headache is located in right frontal   orbital head region, throbbing at a time associated with photophobia and   nausea, but no phonophobia.  She has been having headache for almost 3 weeks.    She rates her headache 9/10.     1. Intractable Headache since age of 15YO  post botox ([facility]--- botox, occipital nerve block, oxycontin)--  2. Intractable spells-- started with eyelid twitching since 2009  3. Status Post occipital nerve stimulator 2003 in the [place]--status post 6 revisions-- in [place]  4. Arthritis? Seeing specialist in [place]( was referred to [place] by Rheumatologist here)    PAST MEDICAL HISTORY:  Past Medical History:   Diagnosis Date   • Allergy, unspecified not elsewhere classified    • Anemia 10/05/2017    Unsure if a current issue.   • Anxiety    • autoimmune    • Depression    • H/O Clostridium difficile infection    • Hx MRSA infection    • Hydrocephalus     with lumbar shunt   • Migraine with aura, with intractable migraine, so stated, without mention of status migrainosus    • MRSA (methicillin resistant Staphylococcus aureus)    • Pituitary abnormality (HCC)    • Staph infection    • Stroke (HCC)     2007       PAST SURGICAL HISTORY:  Past Surgical History:   Procedure Laterality Date   •  SHUNT INSERTION  5/31/2017    Procedure:  SHUNT INSERTION;  Surgeon: Drew Berry M.D.;  Location: SURGERY [facility];  Service:    • SPINAL CORD STIMULATOR  12/19/2016    Procedure: SPINAL CORD STIMULATOR FOR: PLACEMENT OF LEFT FLANK OCCIPITAL NERVE STIMULATOR BATTERY PACK;  Surgeon: Oli Oh III, M.D.;  Location: SURGERY [facility];  Service:    • LUMBAR EXPLORATION N/A 8/31/2015    Procedure: LUMBAR EXPLORATION- Lumbar shunt removal ;  Surgeon: Oli Oh III, M.D.;  Location: SURGERY [facility];  Service:    • IRRIGATION & DEBRIDEMENT NEURO N/A 6/28/2015    Procedure:  "IRRIGATION & DEBRIDEMENT NEURO;  Surgeon: Oli Oh III, M.D.;  Location: SURGERY [facility];  Service:    • APPENDECTOMY     • CHOLECYSTECTOMY     • OTHER      right knee surgery   • OTHER NEUROLOGICAL SURG      occipital nerve stimulator   • TONSILLECTOMY     • TUBAL LIGATION         FAMILY HISTORY:  Family History   Problem Relation Age of Onset   • No Known Problems Mother    • No Known Problems Father        SOCIAL HISTORY:  Social History     Social History   • Marital status:      Spouse name: N/A   • Number of children: N/A   • Years of education: N/A     Occupational History   •       on disability     Social History Main Topics   • Smoking status: Never Smoker   • Smokeless tobacco: Never Used   • Alcohol use Yes      Comment: Rare   • Drug use: No   • Sexual activity: Not on file     Other Topics Concern   • Not on file     Social History Narrative   • No narrative on file     ALLERGIES:  Allergies   Allergen Reactions   • Sumatriptan Unspecified     Historical=\"Heart stops.\" Reaction  between 1995 to 1997   • Prochlorperazine Swelling     Tongue swelling. Reaction as a teen. (compazine)  Tolerated Phenergan on 02/24/15   • Vancomycin Shortness of Breath and Rash     Reaction in 2005.  D/W patient 8/31/15 - tolerated loading dose of vancomycin administered on 8/30/15 with some itching to chest with decreased infusion rate. Red Man Syndrome     TOBHX  History   Smoking Status   • Never Smoker   Smokeless Tobacco   • Never Used     ALCHX  History   Alcohol Use   • Yes     Comment: Rare     DRUGHX  History   Drug Use No           MEDICATIONS:  Current Outpatient Prescriptions   Medication   • levETIRAcetam (KEPPRA) 1000 MG tablet   • temazepam (RESTORIL) 15 MG Cap   • hydroxychloroquine (PLAQUENIL) 200 MG Tab   • diphenhydrAMINE (BENADRYL) 50 MG/ML Solution   • risperiDONE (RISPERDAL) 0.5 MG Tab   • predniSONE (DELTASONE) 5 MG Tab   • Calcium Citrate-Vitamin D (CALCIUM + D PO)  " "  • aspirin (ASA) 81 MG Chew Tab chewable tablet   • omeprazole (PRILOSEC) 20 MG delayed-release capsule   • gabapentin (NEURONTIN) 600 MG tablet   • duloxetine (CYMBALTA) 60 MG CPEP delayed-release capsule   • bacitracin 500 UNIT/GM ointment   • ketorolac (TORADOL) 60 MG/2ML Solution     No current facility-administered medications for this visit.        REVIEW OF SYSTEM:    Constitutional: Denies fevers, Denies weight changes   Eyes: Denies changes in vision, no eye pain   Ears/Nose/Throat/Mouth: Denies nasal congestion or sore throat   Cardiovascular: Denies chest pain or palpitations   Respiratory: Denies SOB.   Gastrointestinal/Hepatic: Denies abdominal pain, nausea, vomiting, diarrhea, constipation or GI bleeding   Genitourinary: Denies bladder dysfunction, dysuria or frequency   Musculoskeletal/Rheum: Denies joint pain and swelling   Skin/Breast: Denies rash, denies breast lumps or discharge   Neurological: intractable headache, occipital stimulator ( 2016 occipital stimulator battery ) off  Psychiatric: denies mood disorder   Endocrine: denies hx of diabetes or thyroid dysfunction   Heme/Oncology/Lymph Nodes: Denies enlarged lymph nodes, denies brusing or known bleeding disorder   Allergic/Immunologic: Denies hx of allergies         PHYSICAL AND NEUROLOGICAL EXMAINATIONS:  VITAL SIGNS: /72   Pulse (!) 120   Temp 36.8 °C (98.3 °F)   Resp 18   Ht 1.575 m (5' 2\")   Wt 75.5 kg (166 lb 8.9 oz)   SpO2 99%   BMI 30.46 kg/m²   CURRENT WEIGHT:  BMI: Body mass index is 30.46 kg/m².  PREVIOUS WEIGHTS:  Wt Readings from Last 25 Encounters:   06/08/18 75.5 kg (166 lb 8.9 oz)   05/30/18 81.8 kg (180 lb 5.4 oz)   04/09/18 82.4 kg (181 lb 10.5 oz)   03/15/18 78.5 kg (173 lb)   02/22/18 76.4 kg (168 lb 6.9 oz)   02/08/18 76.2 kg (168 lb)   12/14/17 76 kg (167 lb 9.6 oz)   12/12/17 76.7 kg (169 lb)   11/19/17 74.3 kg (163 lb 12.8 oz)   11/08/17 75.8 kg (167 lb)   11/07/17 75.8 kg (167 lb)   11/06/17 75.8 kg (167 " lb 1.7 oz)   11/02/17 75.6 kg (166 lb 11.2 oz)   11/01/17 75.8 kg (167 lb)   10/15/17 72.9 kg (160 lb 11.5 oz)   10/11/17 74.6 kg (164 lb 7.4 oz)   10/07/17 72.5 kg (159 lb 13.3 oz)   10/05/17 74.8 kg (165 lb)   09/15/17 76.2 kg (168 lb)   08/29/17 74.8 kg (165 lb)   08/16/17 74.8 kg (165 lb)   07/25/17 74.5 kg (164 lb 3.2 oz)   07/24/17 73.3 kg (161 lb 9.6 oz)   07/13/17 73.3 kg (161 lb 9.6 oz)   07/13/17 75.2 kg (165 lb 12.8 oz)       General appearance of patient: WDWN(+) NAD(+)    EYES  o Fundus : Papilledem(-) Exudates(-) Hemorrhage(-)  Nervous System  Orientation to time, place and person(+)  Memory normal(-)  Language: aphasia(-)  Knowledge: past(+) Current(+)  Attention(+)  Cranial Nerves  • Nerve 2: intact  • Nerve 3,4,6: intact  • Nerve 5 : intact  • Nerve 7: intact  • Nerve 8: intact  • Nerve 9 & 10: intact  • Nerve 11: intact  • Nerve 12: intact  Muscle Power and muscle tone: symmetric, normal in upper and lower  Sensory System: Pin sensation intact(+)  Reflexes: symmetric throughout  Cerebellar Function FNP normal   Gait : Steady(-)  Heart and Vascular  Peripheral Vasucular system : Edema (-) Swelling(-)  RHB, Breathing sound clear  abdomen bowel sound normoactive  Extremities freely moveable  Joints no contracture  Wound over left foot     NEUROIMAGING: I reviewed the MRI/CT of brain       Seeing   Rheumatologist--- Rheumatologist referred the patient to [facility]  Pain specialist-- for botox, occipital nerve block, oxycontin  Wound specialist--- autoimmune?-- seeing     Moved to [place] 5 years    Results for EUNICE BRIGIDBILL LEVINE (MRN [id number]) as of 2/27/2018 15:10   Ref. Range 11/18/2017 04:30 11/20/2017 03:00 2/22/2018 09:36   Vitamin B6 Latest Ref Range: 20.0 - 125.0 nmol/L   325.1 (H)   25-Hydroxy   Vitamin D 25 Latest Ref Range: 30 - 100 ng/mL   62   Free T-4 Latest Ref Range: 0.53 - 1.43 ng/dL  0.78    Stat C-Reactive Protein Latest Ref Range: 0.00 - 0.75 mg/dL 0.27  0.07   Prolactin Latest Ref Range:  2.80 - 26.00 ng/mL 63.18 (H)     Microsomal -Tpo- Abs Latest Ref Range: 0.0 - 9.0 IU/mL   27.3 (H)       ________________________________________________________________________    Please avoid supplementation like Vit B Complex    Vit B6 is better limited to less than 50mg per day  The maximum daily intake of vitamin B6 in adults is 100 milligrams.      [email or web address]  ________________________________________________________________________      The last time, the patient was fully functional-- 3 years ago    IMPRESSION:      1. Intractable Headache since age of 13 YO  post Botox ([facility]ist--- Botox, occipital nerve block, OxyContin)--  2. Intractable spells-- started with eyelid twitching since 2009  3. Status Post occipital nerve stimulator 2003 in the [place]--status post 6 revisions-- in [place]- , recently removed  4. Arthritis? Saw specialist in [place]( was referred to [place] by Rheumatologist here), Skin papules in the past 2 years or so status post skin biopsy-- Plaquenil-- non-descriptive vasculitis  5. Status post  shunt 2017 (under the impression of pseudotumor cerebri). Lumbar shunt in [place] for the pseudotumor cerebri--- complicated with infection  6. Diarrhea 5 times per day for one year-- cause unknown    PLAN/RECOMMENDATIONS:      The patient went through many procedures related with headache-- including lumbar shunt,  shunt and occipital nerve stimulator( not MRI compatible)      The patient is here for spells--- one spell recorded in the hospital was psychogenic, however, the patient's routine EEG is not normal either  The MRI of brain -- not remarkable  Continue Keppra      Botox under the care of pain specialist    We will offer spinal tap again to look for inflammation  Before spinal tap, there are blood tests  After blood tests, could try the  following  ________________________________________________________________________    Vit B1( thiamine) 500mg daily  Fish Oil -- Omega 3 1000mg 3# daily  Try Daily Probiotic too  Vit D-3 4000 unit daily  ________________________________________________________________________      It is fine to try the AIP diet  ________________________________________________________________________  REFERENCE   AUTOIMMUNE PALEO OR AIP DIET                ________________________________________________________________________           ________________________________________________________________________     This intensive video scalp EEG  from 4/9/2018 to 4/13/2018 is consistent with                  1. Focal nonspecific cortical dysfunction over right posterior derivations. Mild degree        There are no epileptiform discharges and no electrographic seizures in this record     ________________________________________________________________________      5/2018   1.  Ventriculostomy shunt catheter in place. No hydrocephalus.  2.  Mild white matter changes  3.  No evidence of mesial temporal sclerosis, gray matter heterotopia or cortical dysplasia or intracranial mass        ________________________________________________________________________              SIGNATURE:  Drew Bacilio      CC:  Elliott Power M.D.      INTERPRETATION:   This is a normal extended video electroencephalogram recording in the awake, drowsy and sleep state.  One typical shaking spell was captured. The spell captured was PSYCHOGENIC in nature. Clinical correlation is recommended.     Note: A normal electroencephalogram does not rule out epilepsy.      Updates provided to Dr. Siegel.        ROUTINE ELECTROENCEPHALOGRAM REPORT      INTERPRETATION:      ________________________________________________________________________    This scalp EEG denotes nonspecific cortical dysfunction - not localizing well-- ( more over right)    There are no definite  epileptiform but Interictal or Post-ictal remains possible    Of note, unremarkable EEG does not completely exclude the diagnosis  of seizures since seizure is an episodic phenomena.  Clinical correlation may help     If clinical suspicion of seizure remains high.  Prolonged outpatient EEG   monitoring may be of help.    ________________________________________________________________________

## 2018-06-08 NOTE — PATIENT INSTRUCTIONS
The last time, the patient was fully functional-- 3 years ago    IMPRESSION:      1. Intractable Headache since age of 13 YO  post Botox ([facility] Pain Specialist--- Botox, occipital nerve block, OxyContin)--  2. Intractable spells-- started with eyelid twitching since 2009  3. Status Post occipital nerve stimulator 2003 in the [place]--status post 6 revisions-- in [place], recently removed  4. Arthritis? Saw specialist in [place]( was referred to [place] by Rheumatologist here), Skin papules in the past 2 years or so status post skin biopsy-- Plaquenil-- non-descriptive vasculitis  5. Status post  shunt 2017 (under the impression of pseudotumor cerebri). Lumbar shunt in [place] for the pseudotumor cerebri--- complicated with infection    PLAN/RECOMMENDATIONS:      The patient went through many procedures related with headache-- including lumbar shunt,  shunt and occipital nerve stimulator( not MRI compatible)      The patient is here for spells--- one spell recorded in the hospital was psychogenic, however, the patient's routine EEG is not normal either  The MRI of brain -- not remarkable  Continue Keppra      Botox under the care of pain specialist    We will offer spinal tap again to look for inflammation  Before spinal tap, there are blood tests  After blood tests, could try the following  ________________________________________________________________________    Vit B1( thiamine) 500mg daily  Fish Oil -- Omega 3 1000mg 3# daily  Try Daily Probiotic too  Vit D-3 4000 unit daily  ________________________________________________________________________             ________________________________________________________________________     This intensive video scalp EEG  from 4/9/2018 to 4/13/2018 is consistent with                  1. Focal nonspecific cortical dysfunction over right posterior derivations. Mild degree        There are no epileptiform discharges and no electrographic seizures in  this record     ________________________________________________________________________      5/2018   1.  Ventriculostomy shunt catheter in place. No hydrocephalus.  2.  Mild white matter changes  3.  No evidence of mesial temporal sclerosis, gray matter heterotopia or cortical dysplasia or intracranial mass          It is fine to try the AIP diet  ________________________________________________________________________  REFERENCE   AUTOIMMUNE PALEO OR AIP DIET                ________________________________________________________________________

## 2018-06-10 PROCEDURE — 93005 ELECTROCARDIOGRAM TRACING: CPT

## 2018-06-10 PROCEDURE — 99285 EMERGENCY DEPT VISIT HI MDM: CPT

## 2018-06-10 PROCEDURE — 93005 ELECTROCARDIOGRAM TRACING: CPT | Performed by: EMERGENCY MEDICINE

## 2018-06-11 ENCOUNTER — HOSPITAL ENCOUNTER (EMERGENCY)
Facility: MEDICAL CENTER | Age: 48
End: 2018-06-11
Attending: EMERGENCY MEDICINE
Payer: MEDICARE

## 2018-06-11 ENCOUNTER — APPOINTMENT (OUTPATIENT)
Dept: RADIOLOGY | Facility: MEDICAL CENTER | Age: 48
End: 2018-06-11
Attending: EMERGENCY MEDICINE
Payer: MEDICARE

## 2018-06-11 VITALS
HEIGHT: 63 IN | HEART RATE: 107 BPM | TEMPERATURE: 98.1 F | BODY MASS INDEX: 29.59 KG/M2 | RESPIRATION RATE: 16 BRPM | WEIGHT: 167 LBS | SYSTOLIC BLOOD PRESSURE: 147 MMHG | DIASTOLIC BLOOD PRESSURE: 97 MMHG | OXYGEN SATURATION: 98 %

## 2018-06-11 DIAGNOSIS — G43.809 OTHER MIGRAINE WITHOUT STATUS MIGRAINOSUS, NOT INTRACTABLE: ICD-10-CM

## 2018-06-11 DIAGNOSIS — E87.6 HYPOKALEMIA: ICD-10-CM

## 2018-06-11 LAB
ANION GAP SERPL CALC-SCNC: 9 MMOL/L (ref 0–11.9)
APTT PPP: 29.2 SEC (ref 24.7–36)
BASOPHILS # BLD AUTO: 0.5 % (ref 0–1.8)
BASOPHILS # BLD: 0.07 K/UL (ref 0–0.12)
BUN SERPL-MCNC: 9 MG/DL (ref 8–22)
CALCIUM SERPL-MCNC: 9.1 MG/DL (ref 8.5–10.5)
CHLORIDE SERPL-SCNC: 105 MMOL/L (ref 96–112)
CO2 SERPL-SCNC: 26 MMOL/L (ref 20–33)
CREAT SERPL-MCNC: 0.66 MG/DL (ref 0.5–1.4)
EKG IMPRESSION: NORMAL
EOSINOPHIL # BLD AUTO: 0.03 K/UL (ref 0–0.51)
EOSINOPHIL NFR BLD: 0.2 % (ref 0–6.9)
ERYTHROCYTE [DISTWIDTH] IN BLOOD BY AUTOMATED COUNT: 42.6 FL (ref 35.9–50)
GLUCOSE SERPL-MCNC: 97 MG/DL (ref 65–99)
HCT VFR BLD AUTO: 40.8 % (ref 37–47)
HGB BLD-MCNC: 13.7 G/DL (ref 12–16)
IMM GRANULOCYTES # BLD AUTO: 0.06 K/UL (ref 0–0.11)
IMM GRANULOCYTES NFR BLD AUTO: 0.4 % (ref 0–0.9)
INR PPP: 1 (ref 0.87–1.13)
LYMPHOCYTES # BLD AUTO: 2.83 K/UL (ref 1–4.8)
LYMPHOCYTES NFR BLD: 19.4 % (ref 22–41)
MCH RBC QN AUTO: 31.9 PG (ref 27–33)
MCHC RBC AUTO-ENTMCNC: 33.6 G/DL (ref 33.6–35)
MCV RBC AUTO: 94.9 FL (ref 81.4–97.8)
MONOCYTES # BLD AUTO: 0.9 K/UL (ref 0–0.85)
MONOCYTES NFR BLD AUTO: 6.2 % (ref 0–13.4)
NEUTROPHILS # BLD AUTO: 10.67 K/UL (ref 2–7.15)
NEUTROPHILS NFR BLD: 73.3 % (ref 44–72)
NRBC # BLD AUTO: 0 K/UL
NRBC BLD-RTO: 0 /100 WBC
PLATELET # BLD AUTO: 361 K/UL (ref 164–446)
PMV BLD AUTO: 9.8 FL (ref 9–12.9)
POTASSIUM SERPL-SCNC: 3.2 MMOL/L (ref 3.6–5.5)
PROTHROMBIN TIME: 12.9 SEC (ref 12–14.6)
RBC # BLD AUTO: 4.3 M/UL (ref 4.2–5.4)
SODIUM SERPL-SCNC: 140 MMOL/L (ref 135–145)
TROPONIN I SERPL-MCNC: <0.01 NG/ML (ref 0–0.04)
WBC # BLD AUTO: 14.6 K/UL (ref 4.8–10.8)

## 2018-06-11 PROCEDURE — 71045 X-RAY EXAM CHEST 1 VIEW: CPT

## 2018-06-11 PROCEDURE — 96375 TX/PRO/DX INJ NEW DRUG ADDON: CPT

## 2018-06-11 PROCEDURE — A9270 NON-COVERED ITEM OR SERVICE: HCPCS | Performed by: EMERGENCY MEDICINE

## 2018-06-11 PROCEDURE — 306578 KIT,PORT ACCESS 20G X 1.5INCH: Performed by: EMERGENCY MEDICINE

## 2018-06-11 PROCEDURE — 84484 ASSAY OF TROPONIN QUANT: CPT

## 2018-06-11 PROCEDURE — 302083 SET,INFUSION NEEDLE 20 X 3/4": Performed by: EMERGENCY MEDICINE

## 2018-06-11 PROCEDURE — 94760 N-INVAS EAR/PLS OXIMETRY 1: CPT

## 2018-06-11 PROCEDURE — 700111 HCHG RX REV CODE 636 W/ 250 OVERRIDE (IP): Performed by: EMERGENCY MEDICINE

## 2018-06-11 PROCEDURE — 700111 HCHG RX REV CODE 636 W/ 250 OVERRIDE (IP)

## 2018-06-11 PROCEDURE — 700102 HCHG RX REV CODE 250 W/ 637 OVERRIDE(OP): Performed by: EMERGENCY MEDICINE

## 2018-06-11 PROCEDURE — 96361 HYDRATE IV INFUSION ADD-ON: CPT

## 2018-06-11 PROCEDURE — 70450 CT HEAD/BRAIN W/O DYE: CPT

## 2018-06-11 PROCEDURE — 700105 HCHG RX REV CODE 258: Performed by: EMERGENCY MEDICINE

## 2018-06-11 PROCEDURE — 85025 COMPLETE CBC W/AUTO DIFF WBC: CPT

## 2018-06-11 PROCEDURE — 36415 COLL VENOUS BLD VENIPUNCTURE: CPT

## 2018-06-11 PROCEDURE — 80048 BASIC METABOLIC PNL TOTAL CA: CPT

## 2018-06-11 PROCEDURE — 96374 THER/PROPH/DIAG INJ IV PUSH: CPT

## 2018-06-11 PROCEDURE — 85610 PROTHROMBIN TIME: CPT

## 2018-06-11 PROCEDURE — 85730 THROMBOPLASTIN TIME PARTIAL: CPT

## 2018-06-11 RX ORDER — POTASSIUM CHLORIDE 20 MEQ/1
20 TABLET, EXTENDED RELEASE ORAL ONCE
Status: COMPLETED | OUTPATIENT
Start: 2018-06-11 | End: 2018-06-11

## 2018-06-11 RX ORDER — DEXAMETHASONE SODIUM PHOSPHATE 4 MG/ML
10 INJECTION, SOLUTION INTRA-ARTICULAR; INTRALESIONAL; INTRAMUSCULAR; INTRAVENOUS; SOFT TISSUE ONCE
Status: COMPLETED | OUTPATIENT
Start: 2018-06-11 | End: 2018-06-11

## 2018-06-11 RX ORDER — METOCLOPRAMIDE HYDROCHLORIDE 5 MG/ML
20 INJECTION INTRAMUSCULAR; INTRAVENOUS ONCE
Status: COMPLETED | OUTPATIENT
Start: 2018-06-11 | End: 2018-06-11

## 2018-06-11 RX ORDER — SODIUM CHLORIDE 9 MG/ML
1000 INJECTION, SOLUTION INTRAVENOUS ONCE
Status: COMPLETED | OUTPATIENT
Start: 2018-06-11 | End: 2018-06-11

## 2018-06-11 RX ORDER — KETOROLAC TROMETHAMINE 30 MG/ML
30 INJECTION, SOLUTION INTRAMUSCULAR; INTRAVENOUS ONCE
Status: COMPLETED | OUTPATIENT
Start: 2018-06-11 | End: 2018-06-11

## 2018-06-11 RX ORDER — HEPARIN SODIUM,PORCINE 10 UNIT/ML
20 VIAL (ML) INTRAVENOUS ONCE
Status: DISCONTINUED | OUTPATIENT
Start: 2018-06-11 | End: 2018-06-11 | Stop reason: HOSPADM

## 2018-06-11 RX ORDER — DIPHENHYDRAMINE HYDROCHLORIDE 50 MG/ML
50 INJECTION INTRAMUSCULAR; INTRAVENOUS ONCE
Status: COMPLETED | OUTPATIENT
Start: 2018-06-11 | End: 2018-06-11

## 2018-06-11 RX ADMIN — METOCLOPRAMIDE 20 MG: 5 INJECTION, SOLUTION INTRAMUSCULAR; INTRAVENOUS at 02:31

## 2018-06-11 RX ADMIN — KETOROLAC TROMETHAMINE 30 MG: 30 INJECTION, SOLUTION INTRAMUSCULAR at 02:31

## 2018-06-11 RX ADMIN — DEXAMETHASONE SODIUM PHOSPHATE 10 MG: 4 INJECTION, SOLUTION INTRAMUSCULAR; INTRAVENOUS at 02:29

## 2018-06-11 RX ADMIN — HEPARIN 500 UNITS: 100 SYRINGE at 04:03

## 2018-06-11 RX ADMIN — DIPHENHYDRAMINE HYDROCHLORIDE 50 MG: 50 INJECTION, SOLUTION INTRAMUSCULAR; INTRAVENOUS at 02:31

## 2018-06-11 RX ADMIN — SODIUM CHLORIDE 1000 ML: 9 INJECTION, SOLUTION INTRAVENOUS at 02:38

## 2018-06-11 RX ADMIN — POTASSIUM CHLORIDE 20 MEQ: 1500 TABLET, EXTENDED RELEASE ORAL at 04:03

## 2018-06-11 NOTE — ED NOTES
"Power port accessed using sterile technique and with out difficulty.   20g 3/4\" needle used, Biopatch in placed covered with large Tegaderm.  Pt medicated see MAR  Blood sent to lab  "

## 2018-06-11 NOTE — ED NOTES
Pt back to room with unsteady gait, tremulous and mixed thoughts.   Pt  states she has increased memory loss and she is disoriented

## 2018-06-11 NOTE — ED PROVIDER NOTES
"ED Provider Note    ED Provider Note      Primary care provider: Elliott Power M.D.    CHIEF COMPLAINT  Chief Complaint   Patient presents with   • Migraine     recently admitted for complex migraine, hx of  shunt   • Palpitations     \"all day\", EKG obtained in triage    • Altered Mental Status     \"I've been confused all day, I just forget things mid sentence\", A/O x4 in triage        HPI  Enedelia Pang is a 47 y.o. female who presents to the Emergency Department with chief complaint of multiple complaints.  Patient reports that she has had a migraine on and off for the last couple weeks this is not the worst headache of her life and was not sudden in onset.  She has had palpitations throughout the day and also states that she has had increased confusion just forgetting things midsentence throughout the day.  The headache is typical for her.  No abnormal features she does have some occasional shaking of her vision and blurred vision.  She feels as though this is increased with her palpitations.  She has had nausea no emesis she said chills without measured fever she does report some slight chest pain in her chest has been having this intermittently for weeks.  No neck pain no hearing changes she denies shortness of breath no abdominal pain no consultation diarrhea dysuria hematuria melena hematochezia no skin changes.  The headache is rated as moderate at this time without alleviating or aggravating factors.    REVIEW OF SYSTEMS  10 systems reviewed and otherwise negative, pertinent positives and negatives listed in the history of present illness.    PAST MEDICAL HISTORY   has a past medical history of Allergy, unspecified not elsewhere classified; Anemia (10/05/2017); Anxiety; autoimmune; Depression; H/O Clostridium difficile infection; MRSA infection; Hydrocephalus; Migraine with aura, with intractable migraine, so stated, without mention of status migrainosus; MRSA (methicillin resistant " "Staphylococcus aureus); Pituitary abnormality (HCC); Staph infection; and Stroke (HCC).    SURGICAL HISTORY   has a past surgical history that includes cholecystectomy; tonsillectomy; appendectomy; tubal ligation; other; other neurological surg; irrigation & debridement neuro (N/A, 6/28/2015); lumbar exploration (N/A, 8/31/2015); spinal cord stimulator (12/19/2016); and  shunt insertion (5/31/2017).    SOCIAL HISTORY  Social History   Substance Use Topics   • Smoking status: Never Smoker   • Smokeless tobacco: Never Used   • Alcohol use Yes      Comment: Rare      History   Drug Use No       FAMILY HISTORY  Non-Contributory    CURRENT MEDICATIONS  Home Medications    **Home medications have not yet been reviewed for this encounter**         ALLERGIES  Allergies   Allergen Reactions   • Sumatriptan Unspecified     Historical=\"Heart stops.\" Reaction  between 1995 to 1997   • Prochlorperazine Swelling     Tongue swelling. Reaction as a teen. (compazine)  Tolerated Phenergan on 02/24/15   • Vancomycin Shortness of Breath and Rash     Reaction in 2005.  D/W patient 8/31/15 - tolerated loading dose of vancomycin administered on 8/30/15 with some itching to chest with decreased infusion rate. Red Man Syndrome       PHYSICAL EXAM  VITAL SIGNS: /97   Pulse (!) 127   Temp 36.7 °C (98.1 °F)   Resp 16   Ht 1.6 m (5' 3\")   Wt 75.8 kg (167 lb)   SpO2 99%   BMI 29.58 kg/m²   Pulse ox interpretation: I interpret this pulse ox as normal.  Constitutional: Alert and oriented x 3, minimal distress  HEENT: Atraumatic normocephalic, pupils are equal round reactive to light extraocular movements are intact. The nares is clear, external ears are normal, mouth shows dry mucous membranes  Neck: Supple, no JVD no tracheal deviation no meningismus  Cardiovascular: Tachycardic no murmur rub or gallop 2+ pulses peripherally x4  Thorax & Lungs: No respiratory distress, no wheezes rales or rhonchi, No chest tenderness.   GI: Soft " nontender nondistended positive bowel sounds, no peritoneal signs  Skin: Warm dry no acute rash or lesion  Musculoskeletal: Moving all extremities with full range and 5 of 5 strength, no acute  deformity  Neurologic: Cranial nerves III through XII are grossly intact, no sensory deficit, no cerebellar dysfunction   Psychiatric: Appropriate affect for situation at this time      DIAGNOSTIC STUDIES / PROCEDURES  LABS      Results for orders placed or performed during the hospital encounter of 06/11/18   CBC WITH DIFFERENTIAL   Result Value Ref Range    WBC 14.6 (H) 4.8 - 10.8 K/uL    RBC 4.30 4.20 - 5.40 M/uL    Hemoglobin 13.7 12.0 - 16.0 g/dL    Hematocrit 40.8 37.0 - 47.0 %    MCV 94.9 81.4 - 97.8 fL    MCH 31.9 27.0 - 33.0 pg    MCHC 33.6 33.6 - 35.0 g/dL    RDW 42.6 35.9 - 50.0 fL    Platelet Count 361 164 - 446 K/uL    MPV 9.8 9.0 - 12.9 fL    Neutrophils-Polys 73.30 (H) 44.00 - 72.00 %    Lymphocytes 19.40 (L) 22.00 - 41.00 %    Monocytes 6.20 0.00 - 13.40 %    Eosinophils 0.20 0.00 - 6.90 %    Basophils 0.50 0.00 - 1.80 %    Immature Granulocytes 0.40 0.00 - 0.90 %    Nucleated RBC 0.00 /100 WBC    Neutrophils (Absolute) 10.67 (H) 2.00 - 7.15 K/uL    Lymphs (Absolute) 2.83 1.00 - 4.80 K/uL    Monos (Absolute) 0.90 (H) 0.00 - 0.85 K/uL    Eos (Absolute) 0.03 0.00 - 0.51 K/uL    Baso (Absolute) 0.07 0.00 - 0.12 K/uL    Immature Granulocytes (abs) 0.06 0.00 - 0.11 K/uL    NRBC (Absolute) 0.00 K/uL   BASIC METABOLIC PANEL   Result Value Ref Range    Sodium 140 135 - 145 mmol/L    Potassium 3.2 (L) 3.6 - 5.5 mmol/L    Chloride 105 96 - 112 mmol/L    Co2 26 20 - 33 mmol/L    Glucose 97 65 - 99 mg/dL    Bun 9 8 - 22 mg/dL    Creatinine 0.66 0.50 - 1.40 mg/dL    Calcium 9.1 8.5 - 10.5 mg/dL    Anion Gap 9.0 0.0 - 11.9   PROTHROMBIN TIME   Result Value Ref Range    PT 12.9 12.0 - 14.6 sec    INR 1.00 0.87 - 1.13   APTT   Result Value Ref Range    APTT 29.2 24.7 - 36.0 sec   TROPONIN   Result Value Ref Range    Troponin  I <0.01 0.00 - 0.04 ng/mL   ESTIMATED GFR   Result Value Ref Range    GFR If African American >60 >60 mL/min/1.73 m 2    GFR If Non African American >60 >60 mL/min/1.73 m 2   EKG (NOW)   Result Value Ref Range    Report       [facility] Emergency Dept.    Test Date:  2018-06-10  Pt Name:    BRIGID DAWSON                 Department: ER  MRN:        [id number]                      Room:  Gender:     Female                       Technician: [id number]  :        1970                   Requested By:ER TRIAGE PROTOCOL  Order #:    [id number]                    Reading MD:    Measurements  Intervals                                Axis  Rate:       123                          P:          52  OK:         140                          QRS:        -11  QRSD:       92                           T:          76  QT:         276  QTc:        395    Interpretive Statements  SINUS TACHYCARDIA  BORDERLINE T WAVE ABNORMALITIES  Compared to ECG 2018 05:11:59  T-wave abnormality now present         All labs reviewed by me.      RADIOLOGY  CT-HEAD W/O   Final Result         1.  No acute intracranial abnormality.      DX-CHEST-PORTABLE (1 VIEW)   Final Result         1.  No acute cardiopulmonary disease.        The radiologist's interpretation of all radiological studies have been reviewed by me.    COURSE & MEDICAL DECISION MAKING  Pertinent Labs & Imaging studies reviewed. (See chart for details)    12:46 AM - Patient seen and examined at bedside.         Patient noted to have slightly elevated blood pressure likely circumstantial secondary to presenting complaint. Referred to primary care physician for further evaluation.    Patient was given IV fluids based on tachycardia dry mucous membranes, after fluids had resolution of tachycardia and improved symptoms    Medical Decision Making: Patient had resolution of tachycardia while in the emergency department her headache is vastly improved.  Troponins negative EKG  "is nonischemic chest x-ray is unremarkable no need for repeat troponin or EKG his symptoms have been present for multiple weeks.  Patient slightly hypokalemic given 1 dose of 20 mEq here.  States she does have prescription for potassium at home but is been noncompliant with taking it.  She is instructed to take this as directed follow-up with primary care physician in 2 days for reevaluation return here for worsening headache altered mental status any worsening chest pain shortness of breath any other acute symptoms or concerns otherwise discharged home in stable condition.    /97   Pulse (!) 107   Temp 36.7 °C (98.1 °F)   Resp 16   Ht 1.6 m (5' 3\")   Wt 75.8 kg (167 lb)   SpO2 98%   BMI 29.58 kg/m²     Elliott Power M.D.  [address] [facility] [address]  [phone]    In 2 days      [facility], Emergency Dept  [address] [facility] [address] [phone]    If symptoms worsen      FINAL IMPRESSION  1. Other migraine without status migrainosus, not intractable    2. Hypokalemia    3.  Chest pain  4.  Palpitations      This dictation has been created using voice recognition software and/or scribes. The accuracy of the dictation is limited by the abilities of the software and the expertise of the scribes. I expect there may be some errors of grammar and possibly content. I made every attempt to manually correct the errors within my dictation. However, errors related to voice recognition software and/or scribes may still exist and should be interpreted within the appropriate context.            "

## 2018-06-11 NOTE — ED NOTES
Pt discharged safely from this ER w/VSS, PIV discontinued with bleeding controlled. Pt is A&Ox4 leaves this ER with a steady gait. PT given all discharge instructions and education with understanding and questions answered. Pt instructed to return to ER or call 911 if symptoms worsen. Pt states feels safe to be discharged and has all belongings in hand.

## 2018-06-11 NOTE — ED TRIAGE NOTES
"Chief Complaint   Patient presents with   • Migraine     recently admitted for complex migraine, hx of  shunt   • Palpitations     \"all day\", EKG obtained in triage    • Altered Mental Status     \"I've been confused all day, I just forget things mid sentence\", A/O x4 in triage      Pt ambulatory to triage following EKG, Pt appears slightly pale and unwell, no focal neuro symptoms noted, PERRLA. Pt was recently hospitalized for a complex migraine, possibly related to her  shunt, has hx of opiate abuse/dependence and withdrawal syndrome, noted in H&P from hospitalization. Pt placed back in the lobby at this time.    Blood pressure 147/97, pulse (!) 127, temperature 36.7 °C (98.1 °F), resp. rate 16, height 1.6 m (5' 3\"), weight 75.8 kg (167 lb), SpO2 99 %.      "

## 2018-06-12 ENCOUNTER — PATIENT OUTREACH (OUTPATIENT)
Dept: HEALTH INFORMATION MANAGEMENT | Facility: OTHER | Age: 48
End: 2018-06-12

## 2018-06-12 NOTE — PROGRESS NOTES
Placed discharge outreach phone call to patient s/p ER discharge 6/11/18.  Left voicemail providing my contact information and instructions to call with any questions or concerns.

## 2018-06-13 ENCOUNTER — TELEPHONE (OUTPATIENT)
Dept: NEUROLOGY | Facility: MEDICAL CENTER | Age: 48
End: 2018-06-13

## 2018-06-13 ENCOUNTER — HOSPITAL ENCOUNTER (INPATIENT)
Facility: MEDICAL CENTER | Age: 48
LOS: 4 days | DRG: 103 | End: 2018-06-19
Attending: EMERGENCY MEDICINE | Admitting: INTERNAL MEDICINE
Payer: MEDICARE

## 2018-06-13 DIAGNOSIS — M54.59 INTRACTABLE LOW BACK PAIN: ICD-10-CM

## 2018-06-13 DIAGNOSIS — R53.1 WEAKNESS: ICD-10-CM

## 2018-06-13 DIAGNOSIS — R00.0 TACHYCARDIA: ICD-10-CM

## 2018-06-13 DIAGNOSIS — G89.4 CHRONIC PAIN SYNDROME: ICD-10-CM

## 2018-06-13 DIAGNOSIS — G43.109 COMPLICATED MIGRAINE: ICD-10-CM

## 2018-06-13 DIAGNOSIS — Z79.891 CHRONIC USE OF OPIATE DRUGS THERAPEUTIC PURPOSES: ICD-10-CM

## 2018-06-13 DIAGNOSIS — M06.9 RHEUMATOID ARTHRITIS, INVOLVING UNSPECIFIED SITE, UNSPECIFIED RHEUMATOID FACTOR PRESENCE: ICD-10-CM

## 2018-06-13 DIAGNOSIS — R25.1 TREMOR: ICD-10-CM

## 2018-06-13 DIAGNOSIS — G43.011 INTRACTABLE MIGRAINE WITHOUT AURA AND WITH STATUS MIGRAINOSUS: ICD-10-CM

## 2018-06-13 LAB
ALBUMIN SERPL BCP-MCNC: 4.4 G/DL (ref 3.2–4.9)
ALBUMIN/GLOB SERPL: 1.6 G/DL
ALP SERPL-CCNC: 87 U/L (ref 30–99)
ALT SERPL-CCNC: 21 U/L (ref 2–50)
ANION GAP SERPL CALC-SCNC: 13 MMOL/L (ref 0–11.9)
APTT PPP: 29.7 SEC (ref 24.7–36)
AST SERPL-CCNC: 20 U/L (ref 12–45)
BASOPHILS # BLD AUTO: 0.9 % (ref 0–1.8)
BASOPHILS # BLD: 0.09 K/UL (ref 0–0.12)
BILIRUB SERPL-MCNC: 0.5 MG/DL (ref 0.1–1.5)
BUN SERPL-MCNC: 5 MG/DL (ref 8–22)
CALCIUM SERPL-MCNC: 9.2 MG/DL (ref 8.5–10.5)
CHLORIDE SERPL-SCNC: 105 MMOL/L (ref 96–112)
CO2 SERPL-SCNC: 22 MMOL/L (ref 20–33)
CREAT SERPL-MCNC: 0.6 MG/DL (ref 0.5–1.4)
DEPRECATED D DIMER PPP IA-ACNC: <200 NG/ML(D-DU)
EKG IMPRESSION: NORMAL
EOSINOPHIL # BLD AUTO: 0.03 K/UL (ref 0–0.51)
EOSINOPHIL NFR BLD: 0.3 % (ref 0–6.9)
ERYTHROCYTE [DISTWIDTH] IN BLOOD BY AUTOMATED COUNT: 45.1 FL (ref 35.9–50)
GLOBULIN SER CALC-MCNC: 2.8 G/DL (ref 1.9–3.5)
GLUCOSE SERPL-MCNC: 67 MG/DL (ref 65–99)
HCT VFR BLD AUTO: 44.9 % (ref 37–47)
HGB BLD-MCNC: 14.6 G/DL (ref 12–16)
IMM GRANULOCYTES # BLD AUTO: 0.06 K/UL (ref 0–0.11)
IMM GRANULOCYTES NFR BLD AUTO: 0.6 % (ref 0–0.9)
INR PPP: 1.13 (ref 0.87–1.13)
LACTATE BLD-SCNC: 1.5 MMOL/L (ref 0.5–2)
LYMPHOCYTES # BLD AUTO: 2.42 K/UL (ref 1–4.8)
LYMPHOCYTES NFR BLD: 23.7 % (ref 22–41)
MCH RBC QN AUTO: 31.7 PG (ref 27–33)
MCHC RBC AUTO-ENTMCNC: 32.5 G/DL (ref 33.6–35)
MCV RBC AUTO: 97.6 FL (ref 81.4–97.8)
MONOCYTES # BLD AUTO: 0.62 K/UL (ref 0–0.85)
MONOCYTES NFR BLD AUTO: 6.1 % (ref 0–13.4)
NEUTROPHILS # BLD AUTO: 7.01 K/UL (ref 2–7.15)
NEUTROPHILS NFR BLD: 68.4 % (ref 44–72)
NRBC # BLD AUTO: 0 K/UL
NRBC BLD-RTO: 0 /100 WBC
PLATELET # BLD AUTO: 349 K/UL (ref 164–446)
PMV BLD AUTO: 9.7 FL (ref 9–12.9)
POTASSIUM SERPL-SCNC: 3.7 MMOL/L (ref 3.6–5.5)
PROT SERPL-MCNC: 7.2 G/DL (ref 6–8.2)
PROTHROMBIN TIME: 14.2 SEC (ref 12–14.6)
RBC # BLD AUTO: 4.6 M/UL (ref 4.2–5.4)
SODIUM SERPL-SCNC: 140 MMOL/L (ref 135–145)
TROPONIN I SERPL-MCNC: <0.01 NG/ML (ref 0–0.04)
WBC # BLD AUTO: 10.2 K/UL (ref 4.8–10.8)

## 2018-06-13 PROCEDURE — 96361 HYDRATE IV INFUSION ADD-ON: CPT

## 2018-06-13 PROCEDURE — 700105 HCHG RX REV CODE 258: Performed by: EMERGENCY MEDICINE

## 2018-06-13 PROCEDURE — 96374 THER/PROPH/DIAG INJ IV PUSH: CPT

## 2018-06-13 PROCEDURE — 93005 ELECTROCARDIOGRAM TRACING: CPT

## 2018-06-13 PROCEDURE — 93005 ELECTROCARDIOGRAM TRACING: CPT | Performed by: EMERGENCY MEDICINE

## 2018-06-13 PROCEDURE — 85379 FIBRIN DEGRADATION QUANT: CPT

## 2018-06-13 PROCEDURE — 80053 COMPREHEN METABOLIC PANEL: CPT

## 2018-06-13 PROCEDURE — 85025 COMPLETE CBC W/AUTO DIFF WBC: CPT

## 2018-06-13 PROCEDURE — 700111 HCHG RX REV CODE 636 W/ 250 OVERRIDE (IP): Performed by: EMERGENCY MEDICINE

## 2018-06-13 PROCEDURE — 99285 EMERGENCY DEPT VISIT HI MDM: CPT

## 2018-06-13 PROCEDURE — 85610 PROTHROMBIN TIME: CPT

## 2018-06-13 PROCEDURE — 96375 TX/PRO/DX INJ NEW DRUG ADDON: CPT

## 2018-06-13 PROCEDURE — 36415 COLL VENOUS BLD VENIPUNCTURE: CPT

## 2018-06-13 PROCEDURE — 85730 THROMBOPLASTIN TIME PARTIAL: CPT

## 2018-06-13 PROCEDURE — 87040 BLOOD CULTURE FOR BACTERIA: CPT

## 2018-06-13 PROCEDURE — 84484 ASSAY OF TROPONIN QUANT: CPT

## 2018-06-13 PROCEDURE — 83605 ASSAY OF LACTIC ACID: CPT

## 2018-06-13 RX ORDER — MORPHINE SULFATE 4 MG/ML
4 INJECTION, SOLUTION INTRAMUSCULAR; INTRAVENOUS ONCE
Status: COMPLETED | OUTPATIENT
Start: 2018-06-13 | End: 2018-06-13

## 2018-06-13 RX ORDER — KETOROLAC TROMETHAMINE 30 MG/ML
30 INJECTION, SOLUTION INTRAMUSCULAR; INTRAVENOUS ONCE
Status: COMPLETED | OUTPATIENT
Start: 2018-06-13 | End: 2018-06-13

## 2018-06-13 RX ORDER — DIPHENHYDRAMINE HYDROCHLORIDE 50 MG/ML
50 INJECTION INTRAMUSCULAR; INTRAVENOUS ONCE
Status: COMPLETED | OUTPATIENT
Start: 2018-06-13 | End: 2018-06-13

## 2018-06-13 RX ORDER — RISPERIDONE 1 MG/1
2 TABLET ORAL 2 TIMES DAILY
COMMUNITY
End: 2019-08-31

## 2018-06-13 RX ORDER — METOCLOPRAMIDE HYDROCHLORIDE 5 MG/ML
10 INJECTION INTRAMUSCULAR; INTRAVENOUS ONCE
Status: COMPLETED | OUTPATIENT
Start: 2018-06-13 | End: 2018-06-13

## 2018-06-13 RX ORDER — ONDANSETRON 2 MG/ML
4 INJECTION INTRAMUSCULAR; INTRAVENOUS ONCE
Status: COMPLETED | OUTPATIENT
Start: 2018-06-13 | End: 2018-06-13

## 2018-06-13 RX ORDER — SODIUM CHLORIDE 9 MG/ML
1000 INJECTION, SOLUTION INTRAVENOUS ONCE
Status: COMPLETED | OUTPATIENT
Start: 2018-06-13 | End: 2018-06-13

## 2018-06-13 RX ADMIN — KETOROLAC TROMETHAMINE 30 MG: 30 INJECTION, SOLUTION INTRAMUSCULAR at 20:11

## 2018-06-13 RX ADMIN — SODIUM CHLORIDE 1000 ML: 9 INJECTION, SOLUTION INTRAVENOUS at 20:11

## 2018-06-13 RX ADMIN — METOCLOPRAMIDE 10 MG: 5 INJECTION, SOLUTION INTRAMUSCULAR; INTRAVENOUS at 20:11

## 2018-06-13 RX ADMIN — MORPHINE SULFATE 4 MG: 4 INJECTION INTRAVENOUS at 20:11

## 2018-06-13 RX ADMIN — DIPHENHYDRAMINE HYDROCHLORIDE 50 MG: 50 INJECTION, SOLUTION INTRAMUSCULAR; INTRAVENOUS at 20:11

## 2018-06-13 RX ADMIN — ONDANSETRON 4 MG: 2 INJECTION INTRAMUSCULAR; INTRAVENOUS at 20:19

## 2018-06-14 ENCOUNTER — APPOINTMENT (OUTPATIENT)
Dept: RADIOLOGY | Facility: MEDICAL CENTER | Age: 48
DRG: 103 | End: 2018-06-14
Attending: STUDENT IN AN ORGANIZED HEALTH CARE EDUCATION/TRAINING PROGRAM
Payer: MEDICARE

## 2018-06-14 PROBLEM — L03.90 CELLULITIS: Status: ACTIVE | Noted: 2018-06-14

## 2018-06-14 LAB
APPEARANCE UR: CLEAR
BILIRUB UR QL STRIP.AUTO: NEGATIVE
COLOR UR: YELLOW
GLUCOSE UR STRIP.AUTO-MCNC: NEGATIVE MG/DL
KETONES UR STRIP.AUTO-MCNC: NEGATIVE MG/DL
LEUKOCYTE ESTERASE UR QL STRIP.AUTO: NEGATIVE
MICRO URNS: NORMAL
NITRITE UR QL STRIP.AUTO: NEGATIVE
PH UR STRIP.AUTO: 7 [PH]
PROT UR QL STRIP: NEGATIVE MG/DL
RBC UR QL AUTO: NEGATIVE
SP GR UR STRIP.AUTO: 1.01
UROBILINOGEN UR STRIP.AUTO-MCNC: 0.2 MG/DL

## 2018-06-14 PROCEDURE — 700102 HCHG RX REV CODE 250 W/ 637 OVERRIDE(OP): Performed by: STUDENT IN AN ORGANIZED HEALTH CARE EDUCATION/TRAINING PROGRAM

## 2018-06-14 PROCEDURE — 700111 HCHG RX REV CODE 636 W/ 250 OVERRIDE (IP): Performed by: STUDENT IN AN ORGANIZED HEALTH CARE EDUCATION/TRAINING PROGRAM

## 2018-06-14 PROCEDURE — 97165 OT EVAL LOW COMPLEX 30 MIN: CPT

## 2018-06-14 PROCEDURE — G0378 HOSPITAL OBSERVATION PER HR: HCPCS

## 2018-06-14 PROCEDURE — 97162 PT EVAL MOD COMPLEX 30 MIN: CPT

## 2018-06-14 PROCEDURE — 700101 HCHG RX REV CODE 250: Performed by: STUDENT IN AN ORGANIZED HEALTH CARE EDUCATION/TRAINING PROGRAM

## 2018-06-14 PROCEDURE — G8978 MOBILITY CURRENT STATUS: HCPCS | Mod: CK

## 2018-06-14 PROCEDURE — 700105 HCHG RX REV CODE 258: Performed by: STUDENT IN AN ORGANIZED HEALTH CARE EDUCATION/TRAINING PROGRAM

## 2018-06-14 PROCEDURE — A9270 NON-COVERED ITEM OR SERVICE: HCPCS | Performed by: STUDENT IN AN ORGANIZED HEALTH CARE EDUCATION/TRAINING PROGRAM

## 2018-06-14 PROCEDURE — 73130 X-RAY EXAM OF HAND: CPT | Mod: LT

## 2018-06-14 PROCEDURE — 96376 TX/PRO/DX INJ SAME DRUG ADON: CPT

## 2018-06-14 PROCEDURE — G8979 MOBILITY GOAL STATUS: HCPCS | Mod: CI

## 2018-06-14 PROCEDURE — 99220 PR INITIAL OBSERVATION CARE,LEVL III: CPT | Mod: GC | Performed by: INTERNAL MEDICINE

## 2018-06-14 PROCEDURE — G8987 SELF CARE CURRENT STATUS: HCPCS | Mod: CK

## 2018-06-14 PROCEDURE — G8988 SELF CARE GOAL STATUS: HCPCS | Mod: CI

## 2018-06-14 PROCEDURE — 96372 THER/PROPH/DIAG INJ SC/IM: CPT

## 2018-06-14 PROCEDURE — 81003 URINALYSIS AUTO W/O SCOPE: CPT

## 2018-06-14 RX ORDER — OMEPRAZOLE 20 MG/1
20 CAPSULE, DELAYED RELEASE ORAL DAILY
Status: DISCONTINUED | OUTPATIENT
Start: 2018-06-14 | End: 2018-06-19 | Stop reason: HOSPADM

## 2018-06-14 RX ORDER — KETOROLAC TROMETHAMINE 30 MG/ML
60 INJECTION, SOLUTION INTRAMUSCULAR; INTRAVENOUS EVERY 6 HOURS PRN
Status: DISPENSED | OUTPATIENT
Start: 2018-06-14 | End: 2018-06-19

## 2018-06-14 RX ORDER — KETOROLAC TROMETHAMINE 30 MG/ML
60 INJECTION, SOLUTION INTRAMUSCULAR; INTRAVENOUS EVERY 6 HOURS PRN
Status: DISCONTINUED | OUTPATIENT
Start: 2018-06-14 | End: 2018-06-14

## 2018-06-14 RX ORDER — RISPERIDONE 0.5 MG/1
1 TABLET ORAL 2 TIMES DAILY
Status: DISCONTINUED | OUTPATIENT
Start: 2018-06-14 | End: 2018-06-15

## 2018-06-14 RX ORDER — DIHYDROERGOTAMINE MESYLATE 1 MG/ML
1 INJECTION, SOLUTION INTRAMUSCULAR; INTRAVENOUS; SUBCUTANEOUS ONCE
Status: COMPLETED | OUTPATIENT
Start: 2018-06-14 | End: 2018-06-14

## 2018-06-14 RX ORDER — GABAPENTIN 300 MG/1
600 CAPSULE ORAL 3 TIMES DAILY
Status: DISCONTINUED | OUTPATIENT
Start: 2018-06-14 | End: 2018-06-15

## 2018-06-14 RX ORDER — ASPIRIN 81 MG/1
81 TABLET, CHEWABLE ORAL EVERY MORNING
Status: DISCONTINUED | OUTPATIENT
Start: 2018-06-15 | End: 2018-06-19 | Stop reason: HOSPADM

## 2018-06-14 RX ORDER — CEPHALEXIN 500 MG/1
500 CAPSULE ORAL EVERY 6 HOURS
Status: DISCONTINUED | OUTPATIENT
Start: 2018-06-14 | End: 2018-06-19 | Stop reason: HOSPADM

## 2018-06-14 RX ORDER — DULOXETIN HYDROCHLORIDE 60 MG/1
60 CAPSULE, DELAYED RELEASE ORAL 2 TIMES DAILY
Status: DISCONTINUED | OUTPATIENT
Start: 2018-06-14 | End: 2018-06-19 | Stop reason: HOSPADM

## 2018-06-14 RX ORDER — CLINDAMYCIN PHOSPHATE 600 MG/50ML
600 INJECTION, SOLUTION INTRAVENOUS EVERY 8 HOURS
Status: DISCONTINUED | OUTPATIENT
Start: 2018-06-14 | End: 2018-06-14

## 2018-06-14 RX ORDER — LEVETIRACETAM 500 MG/1
1000 TABLET ORAL 2 TIMES DAILY
Status: DISCONTINUED | OUTPATIENT
Start: 2018-06-14 | End: 2018-06-19 | Stop reason: HOSPADM

## 2018-06-14 RX ORDER — DIPHENHYDRAMINE HYDROCHLORIDE 50 MG/ML
50 INJECTION INTRAMUSCULAR; INTRAVENOUS EVERY 6 HOURS PRN
Status: DISCONTINUED | OUTPATIENT
Start: 2018-06-14 | End: 2018-06-14

## 2018-06-14 RX ORDER — PREDNISONE 5 MG/1
5 TABLET ORAL EVERY MORNING
Status: DISCONTINUED | OUTPATIENT
Start: 2018-06-14 | End: 2018-06-19 | Stop reason: HOSPADM

## 2018-06-14 RX ORDER — DIPHENHYDRAMINE HYDROCHLORIDE 50 MG/ML
50 INJECTION INTRAMUSCULAR; INTRAVENOUS EVERY 6 HOURS PRN
Status: DISCONTINUED | OUTPATIENT
Start: 2018-06-14 | End: 2018-06-19 | Stop reason: HOSPADM

## 2018-06-14 RX ORDER — POLYETHYLENE GLYCOL 3350 17 G/17G
1 POWDER, FOR SOLUTION ORAL
Status: DISCONTINUED | OUTPATIENT
Start: 2018-06-14 | End: 2018-06-19 | Stop reason: HOSPADM

## 2018-06-14 RX ORDER — AMOXICILLIN 250 MG
2 CAPSULE ORAL 2 TIMES DAILY
Status: DISCONTINUED | OUTPATIENT
Start: 2018-06-14 | End: 2018-06-19 | Stop reason: HOSPADM

## 2018-06-14 RX ORDER — BISACODYL 10 MG
10 SUPPOSITORY, RECTAL RECTAL
Status: DISCONTINUED | OUTPATIENT
Start: 2018-06-14 | End: 2018-06-19 | Stop reason: HOSPADM

## 2018-06-14 RX ORDER — HYDROXYCHLOROQUINE SULFATE 200 MG/1
300 TABLET, FILM COATED ORAL DAILY
Status: DISCONTINUED | OUTPATIENT
Start: 2018-06-14 | End: 2018-06-19 | Stop reason: HOSPADM

## 2018-06-14 RX ORDER — SODIUM CHLORIDE 9 MG/ML
INJECTION, SOLUTION INTRAVENOUS CONTINUOUS
Status: DISCONTINUED | OUTPATIENT
Start: 2018-06-14 | End: 2018-06-17

## 2018-06-14 RX ORDER — HYDROMORPHONE HYDROCHLORIDE 4 MG/1
8 TABLET ORAL EVERY 6 HOURS PRN
Status: DISCONTINUED | OUTPATIENT
Start: 2018-06-14 | End: 2018-06-19 | Stop reason: HOSPADM

## 2018-06-14 RX ORDER — CIPROFLOXACIN 500 MG/1
500 TABLET, FILM COATED ORAL EVERY 12 HOURS
Status: DISCONTINUED | OUTPATIENT
Start: 2018-06-14 | End: 2018-06-14 | Stop reason: CLARIF

## 2018-06-14 RX ADMIN — LEVETIRACETAM 1000 MG: 500 TABLET ORAL at 22:06

## 2018-06-14 RX ADMIN — HYDROXYCHLOROQUINE SULFATE 300 MG: 200 TABLET, FILM COATED ORAL at 08:52

## 2018-06-14 RX ADMIN — DULOXETINE HYDROCHLORIDE 60 MG: 60 CAPSULE, DELAYED RELEASE ORAL at 08:51

## 2018-06-14 RX ADMIN — DIPHENHYDRAMINE HYDROCHLORIDE 50 MG: 50 INJECTION, SOLUTION INTRAMUSCULAR; INTRAVENOUS at 18:00

## 2018-06-14 RX ADMIN — PREDNISONE 5 MG: 5 TABLET ORAL at 08:52

## 2018-06-14 RX ADMIN — OMEPRAZOLE 20 MG: 20 CAPSULE, DELAYED RELEASE ORAL at 08:52

## 2018-06-14 RX ADMIN — KETOROLAC TROMETHAMINE 60 MG: 30 INJECTION, SOLUTION INTRAMUSCULAR at 18:00

## 2018-06-14 RX ADMIN — KETOROLAC TROMETHAMINE 60 MG: 30 INJECTION, SOLUTION INTRAMUSCULAR at 11:33

## 2018-06-14 RX ADMIN — RISPERIDONE 1 MG: 0.5 TABLET ORAL at 22:06

## 2018-06-14 RX ADMIN — SODIUM CHLORIDE: 9 INJECTION, SOLUTION INTRAVENOUS at 01:19

## 2018-06-14 RX ADMIN — GABAPENTIN 600 MG: 300 CAPSULE ORAL at 13:44

## 2018-06-14 RX ADMIN — SODIUM CHLORIDE: 9 INJECTION, SOLUTION INTRAVENOUS at 03:46

## 2018-06-14 RX ADMIN — KETOROLAC TROMETHAMINE 60 MG: 30 INJECTION, SOLUTION INTRAMUSCULAR at 01:51

## 2018-06-14 RX ADMIN — KETOROLAC TROMETHAMINE 60 MG: 30 INJECTION, SOLUTION INTRAMUSCULAR at 01:19

## 2018-06-14 RX ADMIN — CIPROFLOXACIN HYDROCHLORIDE 500 MG: 500 TABLET, FILM COATED ORAL at 04:00

## 2018-06-14 RX ADMIN — STANDARDIZED SENNA CONCENTRATE AND DOCUSATE SODIUM 2 TABLET: 8.6; 5 TABLET, FILM COATED ORAL at 08:52

## 2018-06-14 RX ADMIN — HYDROMORPHONE HYDROCHLORIDE 8 MG: 4 TABLET ORAL at 16:06

## 2018-06-14 RX ADMIN — STANDARDIZED SENNA CONCENTRATE AND DOCUSATE SODIUM 2 TABLET: 8.6; 5 TABLET, FILM COATED ORAL at 22:06

## 2018-06-14 RX ADMIN — RISPERIDONE 1 MG: 0.5 TABLET ORAL at 08:52

## 2018-06-14 RX ADMIN — LEVETIRACETAM 1000 MG: 500 TABLET ORAL at 01:19

## 2018-06-14 RX ADMIN — GABAPENTIN 600 MG: 300 CAPSULE ORAL at 22:06

## 2018-06-14 RX ADMIN — GABAPENTIN 600 MG: 300 CAPSULE ORAL at 08:52

## 2018-06-14 RX ADMIN — DIPHENHYDRAMINE HYDROCHLORIDE 50 MG: 50 INJECTION, SOLUTION INTRAMUSCULAR; INTRAVENOUS at 01:51

## 2018-06-14 RX ADMIN — DULOXETINE HYDROCHLORIDE 60 MG: 60 CAPSULE, DELAYED RELEASE ORAL at 01:19

## 2018-06-14 RX ADMIN — DULOXETINE HYDROCHLORIDE 60 MG: 60 CAPSULE, DELAYED RELEASE ORAL at 22:06

## 2018-06-14 RX ADMIN — CLINDAMYCIN IN 5 PERCENT DEXTROSE 600 MG: 12 INJECTION, SOLUTION INTRAVENOUS at 13:43

## 2018-06-14 RX ADMIN — CLINDAMYCIN IN 5 PERCENT DEXTROSE 600 MG: 12 INJECTION, SOLUTION INTRAVENOUS at 05:27

## 2018-06-14 RX ADMIN — CEPHALEXIN 500 MG: 500 CAPSULE ORAL at 17:23

## 2018-06-14 RX ADMIN — LEVETIRACETAM 1000 MG: 500 TABLET ORAL at 08:51

## 2018-06-14 RX ADMIN — DIHYDROERGOTAMINE MESYLATE 1 MG: 1 INJECTION, SOLUTION INTRAMUSCULAR; INTRAVENOUS; SUBCUTANEOUS at 17:15

## 2018-06-14 RX ADMIN — HYDROMORPHONE HYDROCHLORIDE 8 MG: 4 TABLET ORAL at 22:05

## 2018-06-14 RX ADMIN — DIPHENHYDRAMINE HYDROCHLORIDE 50 MG: 50 INJECTION, SOLUTION INTRAMUSCULAR; INTRAVENOUS at 11:33

## 2018-06-14 ASSESSMENT — ENCOUNTER SYMPTOMS
WEAKNESS: 1
ORTHOPNEA: 0
SINUS PAIN: 0
HEADACHES: 1
DIARRHEA: 0
SHORTNESS OF BREATH: 0
SPUTUM PRODUCTION: 0
FALLS: 0
NAUSEA: 0
DEPRESSION: 1
WEAKNESS: 0
CONSTIPATION: 0
SENSORY CHANGE: 0
SEIZURES: 0
HEMOPTYSIS: 0
PHOTOPHOBIA: 0
HALLUCINATIONS: 0
FEVER: 0
BLURRED VISION: 1
DIAPHORESIS: 0
SORE THROAT: 0
MYALGIAS: 0
TINGLING: 0
TREMORS: 0
BRUISES/BLEEDS EASILY: 0
COUGH: 0
ABDOMINAL PAIN: 0
VOMITING: 0
DOUBLE VISION: 0
SPEECH CHANGE: 0
NECK PAIN: 0
NERVOUS/ANXIOUS: 0
STRIDOR: 0
FOCAL WEAKNESS: 1
CHILLS: 0
PALPITATIONS: 0
BACK PAIN: 1
POLYDIPSIA: 0

## 2018-06-14 ASSESSMENT — COGNITIVE AND FUNCTIONAL STATUS - GENERAL
MOVING FROM LYING ON BACK TO SITTING ON SIDE OF FLAT BED: A LITTLE
MOVING TO AND FROM BED TO CHAIR: A LOT
DRESSING REGULAR LOWER BODY CLOTHING: A LITTLE
EATING MEALS: A LITTLE
SUGGESTED CMS G CODE MODIFIER DAILY ACTIVITY: CK
SUGGESTED CMS G CODE MODIFIER DAILY ACTIVITY: CK
DRESSING REGULAR LOWER BODY CLOTHING: A LITTLE
CLIMB 3 TO 5 STEPS WITH RAILING: TOTAL
WALKING IN HOSPITAL ROOM: A LITTLE
DAILY ACTIVITIY SCORE: 18
HELP NEEDED FOR BATHING: A LITTLE
WALKING IN HOSPITAL ROOM: TOTAL
DAILY ACTIVITIY SCORE: 19
SUGGESTED CMS G CODE MODIFIER MOBILITY: CL
MOBILITY SCORE: 12
STANDING UP FROM CHAIR USING ARMS: A LOT
CLIMB 3 TO 5 STEPS WITH RAILING: TOTAL
SUGGESTED CMS G CODE MODIFIER MOBILITY: CK
DRESSING REGULAR UPPER BODY CLOTHING: A LITTLE
MOBILITY SCORE: 18
DRESSING REGULAR UPPER BODY CLOTHING: A LITTLE
STANDING UP FROM CHAIR USING ARMS: A LITTLE
TOILETING: A LITTLE
HELP NEEDED FOR BATHING: A LITTLE
PERSONAL GROOMING: A LITTLE
TOILETING: A LOT
MOVING FROM LYING ON BACK TO SITTING ON SIDE OF FLAT BED: A LOT

## 2018-06-14 ASSESSMENT — PATIENT HEALTH QUESTIONNAIRE - PHQ9
2. FEELING DOWN, DEPRESSED, IRRITABLE, OR HOPELESS: NEARLY EVERY DAY
SUM OF ALL RESPONSES TO PHQ9 QUESTIONS 1 AND 2: 6
SUM OF ALL RESPONSES TO PHQ QUESTIONS 1-9: 16
8. MOVING OR SPEAKING SO SLOWLY THAT OTHER PEOPLE COULD HAVE NOTICED. OR THE OPPOSITE, BEING SO FIGETY OR RESTLESS THAT YOU HAVE BEEN MOVING AROUND A LOT MORE THAN USUAL: SEVERAL DAYS
5. POOR APPETITE OR OVEREATING: NEARLY EVERY DAY
7. TROUBLE CONCENTRATING ON THINGS, SUCH AS READING THE NEWSPAPER OR WATCHING TELEVISION: SEVERAL DAYS
1. LITTLE INTEREST OR PLEASURE IN DOING THINGS: NEARLY EVERY DAY
9. THOUGHTS THAT YOU WOULD BE BETTER OFF DEAD, OR OF HURTING YOURSELF: SEVERAL DAYS
3. TROUBLE FALLING OR STAYING ASLEEP OR SLEEPING TOO MUCH: NOT AT ALL
6. FEELING BAD ABOUT YOURSELF - OR THAT YOU ARE A FAILURE OR HAVE LET YOURSELF OR YOUR FAMILY DOWN: SEVERAL DAYS
4. FEELING TIRED OR HAVING LITTLE ENERGY: NEARLY EVERY DAY

## 2018-06-14 ASSESSMENT — PAIN SCALES - GENERAL
PAINLEVEL_OUTOF10: 5
PAINLEVEL_OUTOF10: 8
PAINLEVEL_OUTOF10: 6
PAINLEVEL_OUTOF10: 8

## 2018-06-14 ASSESSMENT — LIFESTYLE VARIABLES
EVER_SMOKED: NEVER
ALCOHOL_USE: NO
SUBSTANCE_ABUSE: 0

## 2018-06-14 ASSESSMENT — GAIT ASSESSMENTS
DISTANCE (FEET): 10
GAIT LEVEL OF ASSIST: MINIMAL ASSIST
ASSISTIVE DEVICE: FRONT WHEEL WALKER

## 2018-06-14 ASSESSMENT — COPD QUESTIONNAIRES
DO YOU EVER COUGH UP ANY MUCUS OR PHLEGM?: NO/ONLY WITH OCCASIONAL COLDS OR INFECTIONS
IN THE PAST 12 MONTHS DO YOU DO LESS THAN YOU USED TO BECAUSE OF YOUR BREATHING PROBLEMS: DISAGREE/UNSURE
COPD SCREENING SCORE: 1
HAVE YOU SMOKED AT LEAST 100 CIGARETTES IN YOUR ENTIRE LIFE: NO/DON'T KNOW
DURING THE PAST 4 WEEKS HOW MUCH DID YOU FEEL SHORT OF BREATH: SOME OF THE TIME

## 2018-06-14 ASSESSMENT — ACTIVITIES OF DAILY LIVING (ADL): TOILETING: INDEPENDENT

## 2018-06-14 NOTE — H&P
Internal Medicine Admitting History and Physical    Name Enedelia Pang     1970   Age/Sex 47 y.o. female   MRN [id number]   Code Status full     After 5PM or if no immediate response to page, please call for cross-coverage  Attending/Team: Dr. Saucedo  /  [facility] team See Patient List for primary contact information  Call [phone] to page    1st Call - Day Intern (R1):   Dr. Chou 2nd Call - Day Sr. Resident (R2/R3):   Dr. Taveras       Chief Complaint:  Head jerking    HPI:  Enedelia Pang is a 47 y.o. female who has PMH including possible chronic/complex migraines, RA-vs-other auto-immune disease, CVA in , pseudotumor cerebri (s/p shunt), chronic pain, and possible psychogenic seizure.   She presents for recurrent jerking motions of her head to the side (for 3 days) and bilateral leg weakness (for 1 day).     She states that this has been ongoing for a couple of days. Is recently seen in the hospital for headache and head-jerking complaints on  (3 days earlier), and observed in the ER where they replaced her low potassium; however today only referred to outpatient follow-up at that time.  Since being in the ER, she has followed up with her PCP, who has increased her risperidone.  She has also called her neurologist's office (but did not see him since being in the ER) his, and was told that if she is having sudden or severe worsening of symptoms to go to the ER. Therefore, she presented to the ER.      In addition to discontinuing of her head jerking motions, she is also noted increasing leg weakness today. She states that it is bilateral and her legs are too weak to stand on. Therefore, her  had to carry her and support her, while she tries to walk. This has only been going on for about the past day.  She was also noted continuous headaches. She has had long-standing migrainous type headache, has not responded well to treatments in the past. She states that she has had this  for years, because on questioning today, she states that it seems somewhat different or worse for the past day.  She has previously been evaluated by neurology for this.    She has previously been evaluated and worked up for multiple conditions including possible vasculitis, and migraines. She has also been noted to have passed hydrocephalus with  shunt, that she states she still has on the right side.  She has unsuccessful at finding a definitive diagnosis for some of her rheumatological/vasculitic complaints, and her neurological symptoms. She has been seeing Dr. Ribera for neurology, who is recently ordered a number of LP/CSF studies that he had wanted done as an outpatient. The patient states she does not want anyone to do it in the ER or bedside. She will only allows on to do a LP, at IR.  She is also been following up with rheumatology with Dr. Andrade, and her PCP is Dr. Elliott Power (family practice).    Her last note from Dr. Ribera (neurology), from 6/8/18,that she has previously tried and failed Depakote, Topamax, and OxyContin, for headaches. She has also tried and failed Botox. She has previously had a nerve stimulator; however, it sounds as though this may have been removed.  He notes that although when episode in the hospital was determined to be psychogenic, he notes her EEG was not entirely normal. In that she was continued on Keppra.    She also notes that a prior physician had manage this pain with her current injectable forms of Toradol and Benadryl, that she uses when she has severe migraines. Additionally, because she is in the hospital so frequently, and is hard blood draw, she has a port in her right chest.    Her last hospital admission (discharge on 5/31/18), noted similar symptoms in the past.  And also note that her occipital nerve stimulator had been removed a few months prior to that.  On that visit, discussion had been had with pain management Specialist, who thought it may have been  "related to opiate withdrawal and suggested continuing Dilaudid after discharge on that medication.    Additionally, she notes that she has had prior problems with an \"unspecified immunological condition.\"  This has also included possibility of vasculitis on the left hand especially. Today, she also presents with significant swelling of her left middle finger, and states that this is similar to before, however, a localized and with the scabbing formation over the middle of the area.  She has noted that she has had this problem with the finger for the past few days, since Sunday (when she was in the hospital with the other symptoms before).      Review of Systems   Constitutional: Negative for chills, diaphoresis and fever.   HENT: Negative for ear pain, hearing loss and sore throat.    Eyes: Positive for blurred vision (with headache). Negative for double vision.   Respiratory: Negative for cough, hemoptysis, sputum production, shortness of breath and stridor.    Cardiovascular: Negative for chest pain, palpitations and orthopnea.   Gastrointestinal: Negative for constipation, diarrhea, nausea and vomiting.   Genitourinary: Negative for dysuria and hematuria.   Musculoskeletal: Negative for falls and myalgias.        Notes weakness in both legs, bilaterally, for the past day.   Skin: Positive for rash (periodic swelling and redness of her skin especially over her left hand.). Negative for itching.   Neurological: Positive for focal weakness (see HPI: Lower leg weakness, bilaterally.), weakness and headaches (see HPI.). Negative for tingling, tremors, sensory change, speech change and seizures (denies recent seizures).   Endo/Heme/Allergies: Negative for polydipsia. Does not bruise/bleed easily.   Psychiatric/Behavioral: Negative for hallucinations and substance abuse (denies substance abuse; however, prior admission had noted that there is possibility that she has been going through withdrawal from opioid " medications.).           Past Medical History:   Past Medical History:   Diagnosis Date   • Allergy, unspecified not elsewhere classified    • Anemia 10/05/2017    Unsure if a current issue.   • Anxiety    • autoimmune    • Depression    • H/O Clostridium difficile infection    • Hx MRSA infection    • Hydrocephalus     with lumbar shunt   • Migraine with aura, with intractable migraine, so stated, without mention of status migrainosus    • MRSA (methicillin resistant Staphylococcus aureus)    • Pituitary abnormality (HCC)    • Staph infection    • Stroke (HCC)     2007       Past Surgical History:  Past Surgical History:   Procedure Laterality Date   •  SHUNT INSERTION  5/31/2017    Procedure:  SHUNT INSERTION;  Surgeon: Drew Berry M.D.;  Location: SURGERY [facility];  Service:    • SPINAL CORD STIMULATOR  12/19/2016    Procedure: SPINAL CORD STIMULATOR FOR: PLACEMENT OF LEFT FLANK OCCIPITAL NERVE STIMULATOR BATTERY PACK;  Surgeon: Oli Oh III, M.D.;  Location: SURGERY [facility];  Service:    • LUMBAR EXPLORATION N/A 8/31/2015    Procedure: LUMBAR EXPLORATION- Lumbar shunt removal ;  Surgeon: Oli Oh III, M.D.;  Location: SURGERY [facility];  Service:    • IRRIGATION & DEBRIDEMENT NEURO N/A 6/28/2015    Procedure: IRRIGATION & DEBRIDEMENT NEURO;  Surgeon: Oli Oh III, M.D.;  Location: SURGERY [facility];  Service:    • APPENDECTOMY     • CHOLECYSTECTOMY     • OTHER      right knee surgery   • OTHER NEUROLOGICAL SURG      occipital nerve stimulator   • TONSILLECTOMY     • TUBAL LIGATION         Family History:  Family History   Problem Relation Age of Onset   • No Known Problems Mother    • No Known Problems Father        Social History:  Social History     Social History   • Marital status:      Spouse name: N/A   • Number of children: N/A   • Years of education: N/A     Occupational History   •       on disability     Social History Main  "Topics   • Smoking status: Never Smoker   • Smokeless tobacco: Never Used   • Alcohol use Yes      Comment: Rare   • Drug use: No   • Sexual activity: Not on file     Other Topics Concern   • Not on file     Social History Narrative   • No narrative on file     Living situation: With .   PCP : Elliott Power M.D.      Current Outpatient Medications:  Home Medications     Reviewed by Agnes Hernandez PhT (Pharmacy Tech) on 06/13/18 at 2236  Med List Status: Complete   Medication Last Dose Status   aspirin (ASA) 81 MG Chew Tab chewable tablet 6/13/2018 Active   Calcium Citrate-Vitamin D (CALCIUM + D PO) 6/13/2018 Active   diphenhydrAMINE (BENADRYL) 50 MG/ML Solution >week Active   duloxetine (CYMBALTA) 60 MG CPEP delayed-release capsule 6/13/2018 Active   gabapentin (NEURONTIN) 600 MG tablet 6/13/2018 Active   hydroxychloroquine (PLAQUENIL) 200 MG Tab 6/13/2018 Active   ketorolac (TORADOL) 60 MG/2ML Solution >week Active   levETIRAcetam (KEPPRA) 1000 MG tablet 6/13/2018 Active   omeprazole (PRILOSEC) 20 MG delayed-release capsule 6/13/2018 Active   predniSONE (DELTASONE) 5 MG Tab 6/13/2018 Active   risperiDONE (RISPERDAL) 1 MG Tab 6/13/2018 Active   temazepam (RESTORIL) 15 MG Cap 6/12/2018 Active                Medication Allergy/Sensitivities:  Allergies   Allergen Reactions   • Sumatriptan Unspecified     Historical=\"Heart stops.\" Reaction  between 1995 to 1997   • Prochlorperazine Swelling     Tongue swelling. Reaction as a teen. (compazine)  Tolerated Phenergan on 02/24/15   • Vancomycin Shortness of Breath and Rash     Reaction in 2005.  D/W patient 8/31/15 - tolerated loading dose of vancomycin administered on 8/30/15 with some itching to chest with decreased infusion rate. Red Man Syndrome         Physical Exam     Vitals:    06/13/18 2000 06/13/18 2100 06/13/18 2130 06/13/18 2200   BP:       Pulse: (!) 105 (!) 105 (!) 108 (!) 101   Resp:       Temp:       TempSrc:       SpO2: 92% 93% 92% 92% " "  Weight:       Height:         Body mass index is 29.41 kg/m².  BP (!) 161/100   Pulse (!) 101   Temp 37.2 °C (98.9 °F) (Temporal)   Resp 16   Ht 1.6 m (5' 3\")   Wt 75.3 kg (166 lb)   SpO2 92%   BMI 29.41 kg/m²   O2 therapy: Pulse Oximetry: 92 %, O2 Delivery: None (Room Air)    Physical Exam   Constitutional: She is oriented to person, place, and time and well-developed, well-nourished, and in no distress. No distress.   HENT:   Head: Normocephalic and atraumatic.   Eyes: EOM are normal. Pupils are equal, round, and reactive to light. Right eye exhibits no discharge. Left eye exhibits no discharge.   Both pupils were quite large on examination, but reacted appropriately to light.   Neck: Neck supple. No tracheal deviation present.   Cardiovascular: Normal rate and regular rhythm.  Exam reveals no gallop and no friction rub.    No murmur heard.  Pulmonary/Chest: Effort normal and breath sounds normal. No stridor. No respiratory distress. She has no wheezes. She has no rales.   Abdominal: Soft. Bowel sounds are normal. She exhibits no distension. There is no tenderness. There is no rebound and no guarding.   Musculoskeletal: She exhibits no edema or tenderness.   Patient had difficulty lifting her legs off of table or performing actions against resistance. However, when tested against resistance she appeared less able to do things, that she had previously done (somewhat out of proportion to the resistance provided).   Lymphadenopathy:     She has no cervical adenopathy.   Neurological: She is alert and oriented to person, place, and time. She exhibits normal muscle tone.   Gross touch is intact, bilaterally. As noted above, she presents with weak muscle strength was (4/5) is, bilaterally in her lower extremities, with multiple muscle and nerve regions involved. The degree of difficulty to resistance appears and also proportion to the resistance provided.   Skin: Skin is warm and dry. Rash (left middle finger, " with mild to moderate erythema and edema.) noted. She is not diaphoretic. No erythema.   Psychiatric: Memory normal.   Patient was an alert ×3, and appeared relaxed. She appeared somewhat indifferent to her current problems.     NKECHI: declined.       Data Review       Lab Data Review:  Recent Results (from the past 24 hour(s))   EKG (ER)    Collection Time: 18  5:07 PM   Result Value Ref Range    Report       [facility]    Test Date:  2018  Pt Name:    BRIGID DAWSON                 Department: ER  MRN:        [id number]                      Room:  Gender:     Female                       Technician: [id number]  :        1970                   Requested By:ER TRIAGE PROTOCOL  Order #:    [id number]                    Reading MD: ELEAZAR HAWKINS MD    Measurements  Intervals                                Axis  Rate:       118                          P:          67  PA:         156                          QRS:        -21  QRSD:       92                           T:          88  QT:         268  QTc:        376    Interpretive Statements  SINUS TACHYCARDIA  BORDERLINE LEFT AXIS DEVIATION  BORDERLINE T ABNORMALITIES, ANT-LAT LEADS  BASELINE WANDER IN LEAD(S) I,III,aVL,aVF,V2,V3,V4,V5  Compared to ECG 06/10/2018 23:59:56  No acute changes  Electronically Signed On 2018 21:40:42 PDT by ELEAZAR HAWKINS MD     CBC WITH DIFFERENTIAL    Collection Time: 18  7:52 PM   Result Value Ref Range    WBC 10.2 4.8 - 10.8 K/uL    RBC 4.60 4.20 - 5.40 M/uL    Hemoglobin 14.6 12.0 - 16.0 g/dL    Hematocrit 44.9 37.0 - 47.0 %    MCV 97.6 81.4 - 97.8 fL    MCH 31.7 27.0 - 33.0 pg    MCHC 32.5 (L) 33.6 - 35.0 g/dL    RDW 45.1 35.9 - 50.0 fL    Platelet Count 349 164 - 446 K/uL    MPV 9.7 9.0 - 12.9 fL    Neutrophils-Polys 68.40 44.00 - 72.00 %    Lymphocytes 23.70 22.00 - 41.00 %    Monocytes 6.10 0.00 - 13.40 %    Eosinophils 0.30 0.00 - 6.90 %    Basophils 0.90 0.00 -  1.80 %    Immature Granulocytes 0.60 0.00 - 0.90 %    Nucleated RBC 0.00 /100 WBC    Neutrophils (Absolute) 7.01 2.00 - 7.15 K/uL    Lymphs (Absolute) 2.42 1.00 - 4.80 K/uL    Monos (Absolute) 0.62 0.00 - 0.85 K/uL    Eos (Absolute) 0.03 0.00 - 0.51 K/uL    Baso (Absolute) 0.09 0.00 - 0.12 K/uL    Immature Granulocytes (abs) 0.06 0.00 - 0.11 K/uL    NRBC (Absolute) 0.00 K/uL   COMP METABOLIC PANEL    Collection Time: 06/13/18  7:52 PM   Result Value Ref Range    Sodium 140 135 - 145 mmol/L    Potassium 3.7 3.6 - 5.5 mmol/L    Chloride 105 96 - 112 mmol/L    Co2 22 20 - 33 mmol/L    Anion Gap 13.0 (H) 0.0 - 11.9    Glucose 67 65 - 99 mg/dL    Bun 5 (L) 8 - 22 mg/dL    Creatinine 0.60 0.50 - 1.40 mg/dL    Calcium 9.2 8.5 - 10.5 mg/dL    AST(SGOT) 20 12 - 45 U/L    ALT(SGPT) 21 2 - 50 U/L    Alkaline Phosphatase 87 30 - 99 U/L    Total Bilirubin 0.5 0.1 - 1.5 mg/dL    Albumin 4.4 3.2 - 4.9 g/dL    Total Protein 7.2 6.0 - 8.2 g/dL    Globulin 2.8 1.9 - 3.5 g/dL    A-G Ratio 1.6 g/dL   TROPONIN    Collection Time: 06/13/18  7:52 PM   Result Value Ref Range    Troponin I <0.01 0.00 - 0.04 ng/mL   LACTIC ACID    Collection Time: 06/13/18  7:52 PM   Result Value Ref Range    Lactic Acid 1.5 0.5 - 2.0 mmol/L   ESTIMATED GFR    Collection Time: 06/13/18  7:52 PM   Result Value Ref Range    GFR If African American >60 >60 mL/min/1.73 m 2    GFR If Non African American >60 >60 mL/min/1.73 m 2   APTT    Collection Time: 06/13/18  8:05 PM   Result Value Ref Range    APTT 29.7 24.7 - 36.0 sec   PROTHROMBIN TIME    Collection Time: 06/13/18  8:05 PM   Result Value Ref Range    PT 14.2 12.0 - 14.6 sec    INR 1.13 0.87 - 1.13   D-DIMER    Collection Time: 06/13/18  8:05 PM   Result Value Ref Range    D-Dimer Screen <200 <250 ng/mL(D-DU)       EKG:   EKG  Review: Completed  QTc:376, HR: 118,   Sinus Tachycardia.   Borderline st-depressions in lat.leads  But similar to prior EKG in May.            Assessment & Plan     * Headache and  focal deficits, likely secondary to Complicated migraine- (present on admission)   Assessment & Plan    Hx of intractable migraine, with unusual presentation on exam.   Questionable hx of psychogenic seizure, but also with hx of pseudotumor cerebri (s/p shunt) and record of CVA in 2007.   Notes leg weakness with indifference, and vague, changing pain complaints.   Possible ddx including complicated migraine, psychogenic seizure, or psychiatric problem;   however, not excluding potential seizure, infection, or medication reaction.   Last MRI - 5/29/18.  Last EEG - 5/30/18.    For now, continue on home medications for migraine (combination of Benadryl and Toradol injections).  Continue keppra, per prior recommendation from neuro.   Neuro previously considered LP...  Team to consider discussing this with Dr. Ribera, for further recommendations for any work up desired by this (not urgent, as previously ordered as outpatient). ... (she will only allow it if IR performed).   Continue ASA-81, for possible CVA.   Consider psych consult.         Cellulitis of finger - vs, vasculitis or calciphylaxis   Assessment & Plan    Patient with swelling and redness of left middle finger.   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   Started on clindamycin as a precaution (and blood cultures).   Prior MRI of wrist in 6/6  Follows with Dr. Andrade (rheumatology)  On prednisone 5 and plaquenil (outpatient, and continued for now).   Team to consider checking with Dr. Andrade for further recommendations.         Rheumatoid arthritis (HCC)- (present on admission)   Assessment & Plan    continuing on home dose prednisone and plaquenil.             Anticipated Hospital stay:  >2 midnights    Quality Measures  Quality-Core Measures   Reviewed items::  EKG reviewed, Labs reviewed and Medications reviewed  DVT: Held, for likely LP in morning.   DVT prophylaxis - mechanical:  SCDs      Parts of this note were created with a computerized  dictation system.    Despite review, there may be some spelling or grammatical errors.    Herber Bansal M.D.  6/14/2018

## 2018-06-14 NOTE — ED NOTES
Pt reports increasing tremors/shaking and generalized weakness, greater in BLE..  Pt reports she is unable to stand due to BLE weakness.

## 2018-06-14 NOTE — ASSESSMENT & PLAN NOTE
Patient with swelling and redness of left middle finger.  -Patient has a history of recurrent swellings of both hands in the past.  Being seen and followed by rheumatology, could be seronegative rheumatoid arthritis, as previous tests are all negative.  Plan  Continue prednisone 5 mg and Plaquenil as scheduled.  We will change antibiotic from clindamycin to Keflex and and monitor blood cultures for any significant growth.  X-rays, left hand and review.  Clinical examination shows low probability of cellulitis as the classic symptoms of heat increase localized temperature and tenderness are absent.   Imaging reveals periarticular swelling with demineralisation,with blood cultures revealing no growth.  Antibiotics cephalexin discontinued on 6/19/18, the finger of the left hand was, normal

## 2018-06-14 NOTE — SENIOR ADMIT NOTE
Senior admit note     Name Enedelia Pang     1970   Age/Sex 47 y.o. female   MRN [id number]       HPI:  48 yo F with complicated pmhx  ( chronic migraine, RA?, autoimmune disease?, CVA in , pseudotumor cerebri s/p  shunt, hydrocephalus, possible psychogenic seizure, chronic pain syndrome) is here for vague symptoms. Pt reported of having electrical shock feeling in her brain? since last , lasting for 2-3 sec, associated with head jerking, as well as started to have worsening of migraine headache, with double vision, and N/V. Reported of having whole body shaking ( mainly UE and LE b/l) as well as gradual onset of weakness of b/l LE with unbalanced gait that lean to the left while in ED afternoon today that require nurse assistance to keep her standing up.    Denies LOC, seizure episode, other FND, difficulty swallowing, speech changes, fever, chills, change in sensation, vertigo, ringing ears.   Pt had a lot of work up so far including autoimmune work up in [facility] with skin biopsy for her hand swelling ( not sure about the result), had recnet imaging CT, MRI of hands , brain and EEG study. Last time saw by Dr. Ribera neurology and Dr Andrade rheumatology.   EDP consulted Dr. Hamlin (Neurology), in his note stated : neurology who believes that there may be a psychogenic component to this and to try to treat her headache and try a lumbar tap. He said that if her headache can be improved he suggested outpatient follow up with Dr. Ribera.  EDP offered LP, pt refused to do by EDP or us, wanted to be done by IR procedure.       Past Medical History:   Past Medical History:   Diagnosis Date   • Allergy, unspecified not elsewhere classified    • Anemia 10/05/2017    Unsure if a current issue.   • Anxiety    • autoimmune    • Depression    • H/O Clostridium difficile infection    • Hx MRSA infection    • Hydrocephalus     with lumbar shunt   • Migraine with aura, with intractable migraine, so stated,  without mention of status migrainosus    • MRSA (methicillin resistant Staphylococcus aureus)    • Pituitary abnormality (HCC)    • Staph infection    • Stroke (HCC)     2007       Past Surgical History:  Past Surgical History:   Procedure Laterality Date   •  SHUNT INSERTION  5/31/2017    Procedure:  SHUNT INSERTION;  Surgeon: Drew Berry M.D.;  Location: SURGERY [facility];  Service:    • SPINAL CORD STIMULATOR  12/19/2016    Procedure: SPINAL CORD STIMULATOR FOR: PLACEMENT OF LEFT FLANK OCCIPITAL NERVE STIMULATOR BATTERY PACK;  Surgeon: Oli Oh III, M.D.;  Location: SURGERY [facility];  Service:    • LUMBAR EXPLORATION N/A 8/31/2015    Procedure: LUMBAR EXPLORATION- Lumbar shunt removal ;  Surgeon: Oli Oh III, M.D.;  Location: SURGERY [facility];  Service:    • IRRIGATION & DEBRIDEMENT NEURO N/A 6/28/2015    Procedure: IRRIGATION & DEBRIDEMENT NEURO;  Surgeon: Oli Oh III, M.D.;  Location: SURGERY [facility];  Service:    • APPENDECTOMY     • CHOLECYSTECTOMY     • OTHER      right knee surgery   • OTHER NEUROLOGICAL SURG      occipital nerve stimulator   • TONSILLECTOMY     • TUBAL LIGATION         Current Outpatient Medications:  Home Medications     Reviewed by Matthew Thompson (Pharmacy Tech) on 06/13/18 at 2236  Med List Status: Complete   Medication Last Dose Status   aspirin (ASA) 81 MG Chew Tab chewable tablet 6/13/2018 Active   Calcium Citrate-Vitamin D (CALCIUM + D PO) 6/13/2018 Active   diphenhydrAMINE (BENADRYL) 50 MG/ML Solution >week Active   duloxetine (CYMBALTA) 60 MG CPEP delayed-release capsule 6/13/2018 Active   gabapentin (NEURONTIN) 600 MG tablet 6/13/2018 Active   hydroxychloroquine (PLAQUENIL) 200 MG Tab 6/13/2018 Active   ketorolac (TORADOL) 60 MG/2ML Solution >week Active   levETIRAcetam (KEPPRA) 1000 MG tablet 6/13/2018 Active   omeprazole (PRILOSEC) 20 MG delayed-release capsule 6/13/2018 Active   predniSONE (DELTASONE) 5 MG Tab  "6/13/2018 Active   risperiDONE (RISPERDAL) 1 MG Tab 6/13/2018 Active   temazepam (RESTORIL) 15 MG Cap 6/12/2018 Active                Medication Allergy/Sensitivities:  Allergies   Allergen Reactions   • Sumatriptan Unspecified     Historical=\"Heart stops.\" Reaction  between 1995 to 1997   • Prochlorperazine Swelling     Tongue swelling. Reaction as a teen. (compazine)  Tolerated Phenergan on 02/24/15   • Vancomycin Shortness of Breath and Rash     Reaction in 2005.  D/W patient 8/31/15 - tolerated loading dose of vancomycin administered on 8/30/15 with some itching to chest with decreased infusion rate. Red Man Syndrome       Family History:  Family History   Problem Relation Age of Onset   • No Known Problems Mother    • No Known Problems Father        Social History:  Social History     Social History   • Marital status:      Spouse name: N/A   • Number of children: N/A   • Years of education: N/A     Occupational History   •       on disability     Social History Main Topics   • Smoking status: Never Smoker   • Smokeless tobacco: Never Used   • Alcohol use Yes      Comment: Rare   • Drug use: No   • Sexual activity: Not on file     Other Topics Concern   • Not on file     Social History Narrative   • No narrative on file     Physical Exam  Vitals:    06/14/18 0030 06/14/18 0100 06/14/18 0225 06/14/18 0227   BP:    116/69   Pulse: 97 99  91   Resp:    16   Temp:    36.2 °C (97.2 °F)   TempSrc:       SpO2: 98% 97%  96%   Weight:   80.1 kg (176 lb 9.4 oz) 80.9 kg (178 lb 5.6 oz)   Height:   1.6 m (5' 3\")      Body mass index is 31.59 kg/m².  /69   Pulse 91   Temp 36.2 °C (97.2 °F)   Resp 16   Ht 1.6 m (5' 3\")   Wt 80.9 kg (178 lb 5.6 oz)   LMP 06/07/2018   SpO2 96%   BMI 31.59 kg/m²   O2 therapy: Pulse Oximetry: 96 %, O2 (LPM): 2, O2 Delivery: Nasal Cannula    Constitutional:  No acute distress, Non-toxic appearance.   HENMT:  Normocephalic, Atraumatic,  shunt palpated, no signs " of infections, Bilateral external ears normal, Oropharynx moist mucous membranes, No oral exudates  Eyes:  PERRLA, EOMI, Conjunctiva normal, No discharge.  Neck:  Normal range of motion, No cervical tenderness, Supple,  Cardiovascular:  tachycardia, Normal rhythm, No murmurs, No rubs, No gallops.     Extremitites with intact distal pulses, edema of hands and joints (MCP, PIP and DIP), multiple small skin holes in her hands and left middle finger as well as left dorsal foot.   Lungs:  Respiratory effort is normal. Normal breath sounds, breath sounds clear to auscultation bilaterally,  no rales, no rhonchi, no wheezing.   Abdomen: Bowel sounds normal, Soft, No tenderness, No guarding, No rebound. Refuse rectal exam   Neurologic: Alert & oriented x 3, 3/4 motor function in her Rt UE and LE, 4/4 strength in her Lt UE and LE, decrease sensory function in Rt LE, cranial nerves II through XII are normal    Lab Data Review:  Recent Results (from the past 24 hour(s))   EKG (ER)    Collection Time: 18  5:07 PM   Result Value Ref Range    Report       [facility] Emergency Dept.    Test Date:  2018  Pt Name:    BRIGID DAWSON                 Department: ER  MRN:        [id number]                      Room:  Gender:     Female                       Technician: [id number]  :        1970                   Requested By:ER TRIAGE PROTOCOL  Order #:    [id number]                    Reading MD: ELEAZAR HAWKINS MD    Measurements  Intervals                                Axis  Rate:       118                          P:          67  WY:         156                          QRS:        -21  QRSD:       92                           T:          88  QT:         268  QTc:        376    Interpretive Statements  SINUS TACHYCARDIA  BORDERLINE LEFT AXIS DEVIATION  BORDERLINE T ABNORMALITIES, ANT-LAT LEADS  BASELINE WANDER IN LEAD(S) I,III,aVL,aVF,V2,V3,V4,V5  Compared to ECG 06/10/2018 23:59:56  No acute  changes  Electronically Signed On 6- 21:40:42 PDT by ELEAZAR HAWKINS MD     CBC WITH DIFFERENTIAL    Collection Time: 06/13/18  7:52 PM   Result Value Ref Range    WBC 10.2 4.8 - 10.8 K/uL    RBC 4.60 4.20 - 5.40 M/uL    Hemoglobin 14.6 12.0 - 16.0 g/dL    Hematocrit 44.9 37.0 - 47.0 %    MCV 97.6 81.4 - 97.8 fL    MCH 31.7 27.0 - 33.0 pg    MCHC 32.5 (L) 33.6 - 35.0 g/dL    RDW 45.1 35.9 - 50.0 fL    Platelet Count 349 164 - 446 K/uL    MPV 9.7 9.0 - 12.9 fL    Neutrophils-Polys 68.40 44.00 - 72.00 %    Lymphocytes 23.70 22.00 - 41.00 %    Monocytes 6.10 0.00 - 13.40 %    Eosinophils 0.30 0.00 - 6.90 %    Basophils 0.90 0.00 - 1.80 %    Immature Granulocytes 0.60 0.00 - 0.90 %    Nucleated RBC 0.00 /100 WBC    Neutrophils (Absolute) 7.01 2.00 - 7.15 K/uL    Lymphs (Absolute) 2.42 1.00 - 4.80 K/uL    Monos (Absolute) 0.62 0.00 - 0.85 K/uL    Eos (Absolute) 0.03 0.00 - 0.51 K/uL    Baso (Absolute) 0.09 0.00 - 0.12 K/uL    Immature Granulocytes (abs) 0.06 0.00 - 0.11 K/uL    NRBC (Absolute) 0.00 K/uL   COMP METABOLIC PANEL    Collection Time: 06/13/18  7:52 PM   Result Value Ref Range    Sodium 140 135 - 145 mmol/L    Potassium 3.7 3.6 - 5.5 mmol/L    Chloride 105 96 - 112 mmol/L    Co2 22 20 - 33 mmol/L    Anion Gap 13.0 (H) 0.0 - 11.9    Glucose 67 65 - 99 mg/dL    Bun 5 (L) 8 - 22 mg/dL    Creatinine 0.60 0.50 - 1.40 mg/dL    Calcium 9.2 8.5 - 10.5 mg/dL    AST(SGOT) 20 12 - 45 U/L    ALT(SGPT) 21 2 - 50 U/L    Alkaline Phosphatase 87 30 - 99 U/L    Total Bilirubin 0.5 0.1 - 1.5 mg/dL    Albumin 4.4 3.2 - 4.9 g/dL    Total Protein 7.2 6.0 - 8.2 g/dL    Globulin 2.8 1.9 - 3.5 g/dL    A-G Ratio 1.6 g/dL   TROPONIN    Collection Time: 06/13/18  7:52 PM   Result Value Ref Range    Troponin I <0.01 0.00 - 0.04 ng/mL   LACTIC ACID    Collection Time: 06/13/18  7:52 PM   Result Value Ref Range    Lactic Acid 1.5 0.5 - 2.0 mmol/L   ESTIMATED GFR    Collection Time: 06/13/18  7:52 PM   Result Value Ref Range     GFR If African American >60 >60 mL/min/1.73 m 2    GFR If Non African American >60 >60 mL/min/1.73 m 2   APTT    Collection Time: 06/13/18  8:05 PM   Result Value Ref Range    APTT 29.7 24.7 - 36.0 sec   PROTHROMBIN TIME    Collection Time: 06/13/18  8:05 PM   Result Value Ref Range    PT 14.2 12.0 - 14.6 sec    INR 1.13 0.87 - 1.13   D-DIMER    Collection Time: 06/13/18  8:05 PM   Result Value Ref Range    D-Dimer Screen <200 <250 ng/mL(D-DU)         Assessment/Plan    Intractable migraine without aura and with status migrainosus    Hx of Intractable spells  Hx of psychogenic seizure?  CVA in 2007  pseudotumor cerebri s/p  shunt  Hx of hydrocephalus  -her presentation is kind vague  -DDX:  Seizure vs psychogenic seizure vs  CVA? (unlikely) vs psychiatric problem  -CT head w/o 6/11:   No acute intracranial abnormality.  -MRI brain w/ and w/o 5/29/18:  1.  Ventriculostomy shunt catheter in place. No hydrocephalus.  2.  Mild white matter changes  3.  No evidence of mesial temporal sclerosis, gray matter heterotopia or cortical dysplasia or intracranial mass  -last EEG study 5/30: wnl   -will treat migraine headache for now with cont home meds ( benadryl and toradol)   -cont ASA for possible CVA?, no statin 2/2 hx of elevated liver enzyme and hepatoxicity  ( possible from Depakote before)  -cont keppra per neuro  -IR lumbar puncture during this admission  -day team to update Dr Alonzo and see any recommendation for LP such as autoimmune work up  -possible psych consult       Cellulitis of middle left finger vs vasculitis vs calciphylaxis?   -reported had skin biopsy in [facility] without knowledge of the result  -MRI wrist 6/6:   Mild ulnar minus variance with a small central triangular fibrocartilage perforation.  No evidence of lunate injury  -f/u with Dr. Nesbitt rheumatology  -will treat empirically with clindamycin  -f/u with rheumatology as outpt vs inpatient work up for vasculitis (ESR, CRP, MG, ANCA, C3/C4..etc),  however , based on last note from Dr. Nesbitt, ANCA serologies are negative x 3 and MG Is negative, antiphospholpid antibodies were negative  -day team to consider contact Dr Nesbitt for any recomm    RA?  -cont plaquenil and prednisone home dose for now  -f/u with Dr Nesbitt      For details, please refer to Dr. Craven H & P

## 2018-06-14 NOTE — ED TRIAGE NOTES
Chief Complaint   Patient presents with   • Shaking     pt reports shaking episodes that pt reports feels like a hiccup, pt states that she will have like a jerking movement to one side, pt with recent visit for same, pt reports feels like she can't get a breath.    • Shortness of Breath     Explained to pt triage process, made pt aware to tell this RN of any changes/concerns, pt verbalized understanding of process and instructions given. Pt to ER lobby.

## 2018-06-14 NOTE — CONSULTS
CC:  Jolts, headache    Date of Admission: 6/13/2018    Today's Date: 06/14/18    Consulting Physician: Jermain Saucedo M.D.      HPI:    Enedelia Pang is a 47 y.o. female notes jolts of electric sensation through head and body, lasting seconds, very uncomfortable and persist. She states they just started yesterday, but father at bedside notes been happening for almost a week. Headache moderate normal migraine in background now mild. No light or sound sensitive or chills or neck stiff. No trauma. Legs haven't cooperated since the jolts but with effort can use them left worse than right. No bowel or bladder habit changes. Taking her keppra no missed doses.   No other complaints.       ROS:   Constitutional: No fevers or chills.  Eyes: No blurry vision or eye pain.  ENT: No dysphagia or hearing loss.  Respiratory: No cough or shortness of breath.  Cardiovascular: No chest pain or palpitations.  GI: No nausea, vomiting, or diarrhea.  : No urinary incontinence or dysuria.  Musculoskeletal: No joint swelling or arthralgias.  Skin: No skin rashes.  Neuro: See HPI.  Endocrine: No heat or cold intolerance. No polydipsia or polyuria.  Psych: No depression or anxiety.  Heme/Lymph: No easy bruising or swollen lymph nodes.  All other systems were reviewed and were negative.       Past Medical History:   Past Medical History:   Diagnosis Date   • Anemia 10/05/2017    Unsure if a current issue.   • Allergy, unspecified not elsewhere classified    • Anxiety    • autoimmune    • Depression    • H/O Clostridium difficile infection    • Hx MRSA infection    • Hydrocephalus     with lumbar shunt   • Migraine with aura, with intractable migraine, so stated, without mention of status migrainosus    • MRSA (methicillin resistant Staphylococcus aureus)    • Pituitary abnormality (HCC)    • Staph infection    • Stroke (HCC)     2007       Past Surgical History:   Past Surgical History:   Procedure Laterality Date   •  SHUNT INSERTION  " 5/31/2017    Procedure:  SHUNT INSERTION;  Surgeon: Drew Berry M.D.;  Location: SURGERY [facility];  Service:    • SPINAL CORD STIMULATOR  12/19/2016    Procedure: SPINAL CORD STIMULATOR FOR: PLACEMENT OF LEFT FLANK OCCIPITAL NERVE STIMULATOR BATTERY PACK;  Surgeon: Oli Oh III, M.D.;  Location: SURGERY [facility];  Service:    • LUMBAR EXPLORATION N/A 8/31/2015    Procedure: LUMBAR EXPLORATION- Lumbar shunt removal ;  Surgeon: Oli Oh III, M.D.;  Location: SURGERY [facility];  Service:    • IRRIGATION & DEBRIDEMENT NEURO N/A 6/28/2015    Procedure: IRRIGATION & DEBRIDEMENT NEURO;  Surgeon: Oli Oh III, M.D.;  Location: SURGERY [facility];  Service:    • APPENDECTOMY     • CHOLECYSTECTOMY     • OTHER      right knee surgery   • OTHER NEUROLOGICAL SURG      occipital nerve stimulator   • TONSILLECTOMY     • TUBAL LIGATION         Social History:   Social History     Social History   • Marital status:      Spouse name: N/A   • Number of children: N/A   • Years of education: N/A     Occupational History   •       on disability     Social History Main Topics   • Smoking status: Never Smoker   • Smokeless tobacco: Never Used   • Alcohol use Yes      Comment: Rare   • Drug use: No   • Sexual activity: Not on file     Other Topics Concern   • Not on file     Social History Narrative   • No narrative on file       Family History:   Family History   Problem Relation Age of Onset   • No Known Problems Mother    • No Known Problems Father        Allergies:   Allergies   Allergen Reactions   • Sumatriptan Unspecified     Historical=\"Heart stops.\" Reaction  between 1995 to 1997   • Prochlorperazine Swelling     Tongue swelling. Reaction as a teen. (compazine)  Tolerated Phenergan on 02/24/15   • Vancomycin Shortness of Breath and Rash     Reaction in 2005.  D/W patient 8/31/15 - tolerated loading dose of vancomycin administered on 8/30/15 with some itching to " chest with decreased infusion rate. Red Man Syndrome         Current Facility-Administered Medications:   •  senna-docusate (PERICOLACE or SENOKOT S) 8.6-50 MG per tablet 2 Tab, 2 Tab, Oral, BID, 2 Tab at 06/14/18 0852 **AND** polyethylene glycol/lytes (MIRALAX) PACKET 1 Packet, 1 Packet, Oral, QDAY PRN **AND** magnesium hydroxide (MILK OF MAGNESIA) suspension 30 mL, 30 mL, Oral, QDAY PRN **AND** bisacodyl (DULCOLAX) suppository 10 mg, 10 mg, Rectal, QDAY PRN, Herber Bansal M.D.  •  NS infusion, , Intravenous, Continuous, Herber Bansal M.D., Last Rate: 83 mL/hr at 06/14/18 0346  •  [START ON 6/15/2018] aspirin (ASA) chewable tab 81 mg, 81 mg, Oral, QAM, Herbre Bansal M.D.  •  diphenhydrAMINE (BENADRYL) injection 50 mg, 50 mg, Intramuscular, Q6HRS PRN, Herber Bansal M.D., 50 mg at 06/14/18 1133  •  levETIRAcetam (KEPPRA) tablet 1,000 mg, 1,000 mg, Oral, BID, Herber Bansal M.D., 1,000 mg at 06/14/18 0851  •  omeprazole (PRILOSEC) capsule 20 mg, 20 mg, Oral, DAILY, Herber Bansal M.D., 20 mg at 06/14/18 0852  •  predniSONE (DELTASONE) tablet 5 mg, 5 mg, Oral, QAM, Herber Bansal M.D., 5 mg at 06/14/18 0852  •  risperiDONE (RISPERDAL) tablet 1 mg, 1 mg, Oral, BID, Herber Bansal M.D., 1 mg at 06/14/18 0852  •  DULoxetine (CYMBALTA) capsule 60 mg, 60 mg, Oral, BID, Herber Bansal M.D., 60 mg at 06/14/18 0851  •  gabapentin (NEURONTIN) capsule 600 mg, 600 mg, Oral, TID, Herber Bansal M.D., 600 mg at 06/14/18 0852  •  hydroxychloroquine (PLAQUENIL) tablet 300 mg, 300 mg, Oral, DAILY, Herber Bansal M.D., 300 mg at 06/14/18 0852  •  ketorolac (TORADOL) injection 60 mg, 60 mg, Intramuscular, Q6HRS PRN, Herber Bansal M.D., 60 mg at 06/14/18 1133  •  clindamycin (CLEOCIN) IVPB premix 600 mg, 600 mg, Intravenous, Q8HRS, Herber Bansal M.D., Stopped at 06/14/18 0557      PHYSICAL EXAM    Vitals:    06/14/18 0225 06/14/18 0227 06/14/18 0750  "06/14/18 1100   BP:  116/69 115/60 108/66   Pulse:  91 84 94   Resp:  16 14 16   Temp:  36.2 °C (97.2 °F) 36.7 °C (98 °F) 36.1 °C (97 °F)   TempSrc:       SpO2:  96% 97% 98%   Weight: 80.1 kg (176 lb 9.4 oz) 80.9 kg (178 lb 5.6 oz)     Height: 1.6 m (5' 3\")          Head/Neck: NCAT no meningismus neg kernig neg brudzinski. No obvious mass or heard bruit. No tender arteries or lost pulses.  No rash of head or neck.  Skin: Warm, dry, intact. No rashes observed head/neck or body  Eyes/Funduscopic: Optic discs flat with no evidence of papilledema or pallor. Normal posterior segments.    Mental Status: Awake, alert, oriented x 3. Names/repeats/fluent/follows commands. No neglect/extinction. Attention and concentration, recent & remote memory, Fund of Knowledge wnl.  Cranial Nerves: CN II-XII intact. PERRL.   No afferent pupillary defect. EOM full. VF full. sym grimace & eye closure. No nystagmus.     Motor: strength/bulk/tone wnl.  intact and sym, no abn mvmts   Sensory: Intact light touch & sharp  Coordination: finger to nose & heel to shin intact.   DTR's: intact/sym. no clonus. Toes mute.  Gait/Station: n/a fall concern     NIHSS: 0  Strongly functional exam     Labs:  Recent Labs      06/13/18 1952   WBC  10.2   RBC  4.60   HEMOGLOBIN  14.6   HEMATOCRIT  44.9   MCV  97.6   MCH  31.7   MCHC  32.5*   RDW  45.1   PLATELETCT  349   MPV  9.7     Recent Labs      06/13/18 1952   SODIUM  140   POTASSIUM  3.7   CHLORIDE  105   CO2  22   GLUCOSE  67   BUN  5*   CREATININE  0.60   CALCIUM  9.2     Recent Labs      06/13/18 2005   APTT  29.7   INR  1.13         Recent Labs      06/13/18 1952   TROPONINI  <0.01         Recent Labs      06/13/18 1952   SODIUM  140   POTASSIUM  3.7   CHLORIDE  105   CO2  22   GLUCOSE  67   BUN  5*     Recent Labs      06/13/18 1952   SODIUM  140   POTASSIUM  3.7   CHLORIDE  105   CO2  22   BUN  5*   CREATININE  0.60   CALCIUM  9.2     Recent Labs      06/13/18 2005   APTT  29.7   INR "  1.13     Results for orders placed or performed during the hospital encounter of 10/29/14   2-D ECHO W/DOPPLER (ECHOCARDIOGRAM COMP W/O CONT)   Result Value Ref Range    Eject.Frac. MOD BP 72.63     Eject.Frac. MOD 4C 71.9     Eject.Frac. MOD 2C 69.76               Imaging: neuroimaging reviewed and directly visualized by me  No orders to display       Assessment/Plan:    TREMORS SUSPECT FUNCTIONAL, HX OF LESLIE ON EEG RECENTLY, FUNCTIONAL EXAM    HEADACHES, CHRONIC SINCE 2ND DECADE, CONCERN FOR IIH W/ VPS    LP low yield no nuchal rigid no sig headache, no WBC elevation- will hold this study  EEG (5d eeg with ana BROWN showed LESLIE per her report no epileptic findings)  MR C spine  Psych consult    I am familiar with this patient from prior visit, she presents similar to before.    Viktor Hamlin M.D.  , Neurohospitalist & Stroke  Clinical Professor, [employer]  Diplomate, Neurology & Neuroimaging

## 2018-06-14 NOTE — PROGRESS NOTES
Two nurse skin check. Pt has swelling in bilateral hands with varies healing scabs on tops of both hands. Top of left foot with a healing wound. Red. Scabbed over. Pt states hands are being worked up for an autoimmune issue. Biopsy was done.

## 2018-06-14 NOTE — CARE PLAN
Problem: Safety  Goal: Will remain free from falls  Outcome: PROGRESSING AS EXPECTED  Pt alert and will call for assistance, bed strip alarm on     Problem: Pain Management  Goal: Pain level will decrease to patient's comfort goal  Outcome: PROGRESSING AS EXPECTED  Pt medicated prior to arriving on floor

## 2018-06-14 NOTE — PROGRESS NOTES
Pt arrived from ER. Just medicated for a HA she rates at 8/10 before leaving the ER. ER accessed her RT Port and NS infusing. Placed on pump at 83ml/hr. O2 2L/NC. Went over admission forms. Pt passed bedside swallow test. Able to walk with assist of 2 to BR and urine sent to lab. Pt is A&O. Bed strip alarm in place. Call light in reach.

## 2018-06-14 NOTE — ED NOTES
Assist RN:  Medications administered (see MAR) through port. Pt provided damp washcloth for forehead.

## 2018-06-14 NOTE — THERAPY
"Physical Therapy Evaluation completed.   Bed Mobility:  Supine to Sit: Stand by Assist  Transfers: Sit to Stand: Contact Guard Assist  Gait: Level Of Assist: Minimal Assist with Front-Wheel Walker       Plan of Care: Will benefit from Physical Therapy 3 times per week  Discharge Recommendations: Equipment: Will Continue to Assess for Equipment Needs.     See \"Rehab Therapy-Acute\" Patient Summary Report for complete documentation.     Pt presented to PT for primary risk reduction for LOB/falling. Pt presents with impaired balance, impaired corrdination, imparied gait, weakness in BLE, and poor activity tolerance. Pt was able to demonstrate CGA to Min A for all functional mobility at this time w/FWW use. Pt was able to tolerate amb from bed to the bathroom at this time, and is primarily limited by weakness. Demonstrated with multiple knee buckling occurances, however, was able to support self with FWW use with UE. Formal testing appeared to be slighlty inconsistent with functional capabilites at times. Pt will benefit from skilled PT at this time with recommendation for post acute thearpy services prior to d/c home given current objective findings, age, IPLOF, and limited social support.   "

## 2018-06-14 NOTE — ASSESSMENT & PLAN NOTE
Hx of intractable migraine, with unusual presentation on exam.   Questionable hx of psychogenic seizure, but also with hx of pseudotumor cerebri (s/p shunt) and record of CVA in 2007.   Notes leg weakness with indifference, and vague, changing pain complaints.   Possible ddx including complicated migraine, psychogenic seizure, or psychiatric problem;   however, not excluding potential seizure, infection, or medication reaction.   Last MRI - 5/29/18.  Within normal limits,-reveals ventriculostomy shunt catheter in place with no hydrocephalus and mild white matter change  Last EEG - 5/30/18.  Within normal limits  Plan:  Started on ergotamine 1 mg IV and p.o. Dilaudid 8 mg every 8 hours as needed for pain.  Patient gets good relief of pain on above medications.  Continue home medications of Benadryl and Toradol injections.  Continue keppra, per prior recommendation from neuro.   IR-lumbar puncture, not recommended.  Neurology note seen, can be discharged,for outpatient follow up once, after after MRC spine normal.  Discussed with neurosurgeon, , who advised against MRC spine at this point, as it is not a surgical emergency, and there is a possibility of the  shunt, developing problems, after the MR C-spine.  Psychiatry follow up note seen,have increased the Resperidal and gabapentin to Alleviate the pain and anxiety.   follow-up as outpatient if needed   Pain management specialist of  patient to be consulted .  Continue ASA-81.  Will start weaning patient off pain medication, after consultation with her pain management specialist.  Patient was counseled on the effects of narcotics and opiates and has agreed , to talk to her pain management specialist about tapering off her narcotics, discharge plans and follow-up as appropriate as the patient is currently on Dilaudid and oxycodone which he uses as an outpatient.  We will start Depakote 500 mg twice daily for headaches.  Patient started on a low-dose  ketamine drip in an effort to wean her off the opiates, and was also started on Depakote 500 mg twice daily for her headaches with the aim off weaning narcotics down to a baseline of 30 MSE, her pain management team at [facility], Ms. Uma Reece , nurse practitioner, was updated the message.  Was discharged on Nucenta 50 mg twice daily for 10 days, for pain management, until she follows up with the pain management specialist at the [facility]

## 2018-06-14 NOTE — THERAPY
"Occupational Therapy Evaluation completed.   Functional Status: Pt is a 46 y/o female admitted with localized head jerking. She is currently pleasant and cooperative, odd affect at times and very soft spoken. She is requiring SBA for bed mobiilty. CGA sit<>stand and Brenda for functional mobility with FWW, heavy reliance on UE support. With formal testing, RUE is weaker than LUE, 3/5. She was able to brush her teeth with Brenda in stance using her RUE. She was provided with built up  for feeding as she reports dropping utensils. She is limited by weakness, fatigue, and impaired balance whcih impacts independence in ADLs and functional mobility.  Plan of Care: Will benefit from Occupational Therapy 3 times per week  Discharge Recommendations:  Equipment: Will Continue to Assess for Equipment Needs.     See \"Rehab Therapy-Acute\" Patient Summary Report for complete documentation.    "

## 2018-06-14 NOTE — ED PROVIDER NOTES
"ED Provider Note    Scribed for Mauro Lopez M.D. by Francisco Armijo. 6/13/2018, 6:22 PM.    Primary care provider: Elliott Power M.D.  Means of arrival: walk in  History obtained from: patient   History limited by: none     CHIEF COMPLAINT  Chief Complaint   Patient presents with   • Shaking     pt reports shaking episodes that pt reports feels like a hiccup, pt states that she will have like a jerking movement to one side, pt with recent visit for same, pt reports feels like she can't get a breath.    • Shortness of Breath       HPI  Enedelia Pang is a 47 y.o. female with a history of CVA and chronic headaches who presents to the Emergency Department with shaking and shortness of breath onset two days ago. Patient reports that she feels like she cannot catch a breath and is having shaking episodes that \"feels like a hiccup or electrical shock feeling throughout her body\" as well as a headache (with photophobia, double vision and an episode of emesis) and some bouts of confusion per . She reports that she had an MRI about a week ago and the headache was present at this time but her jerking symptoms were not. Patient also reports that she has had some episodes of loss of strength in her bilateral legs to the point where she cannot walk when her  had to carry her but they deny any loss of consciousness. She reports that her Keppra dosage was increased about three weeks ago. Patient reports that she followed up with her PCP and called here neurologists assistant (Dr. Qiu) today and was told to come into the ED if her symptoms worsen. Patient reports that she had a occipital nerve stimulator taken out three weeks ago secondary to an unspecified autoimmune disorder. She has a history of multiple admissions here for tachycardia as well.     Patient was seen here two ago for a headache and palpitations and her workup showed a white count of 14, negative head CT, negative chest x-ray, low " "potassium. She was given IV fluids for tachycardia and after a nights stay was discharged home and told to follow up with PCP.     REVIEW OF SYSTEMS  Pertinent positives include \"electrical feeling jerking,\" shortness of breath, headache, weakness, photophobia, and double vision. Pertinent negatives include fever, chills, loss of consciousness, trauma, dizziness, numbness, tinging. All other systems negative.    PAST MEDICAL HISTORY   has a past medical history of Allergy, unspecified not elsewhere classified; Anemia (10/05/2017); Anxiety; autoimmune; Depression; H/O Clostridium difficile infection; MRSA infection; Hydrocephalus; Migraine with aura, with intractable migraine, so stated, without mention of status migrainosus; MRSA (methicillin resistant Staphylococcus aureus); Pituitary abnormality (HCC); Staph infection; and Stroke (HCC).    SURGICAL HISTORY   has a past surgical history that includes cholecystectomy; tonsillectomy; appendectomy; tubal ligation; other; other neurological surg; irrigation & debridement neuro (N/A, 6/28/2015); lumbar exploration (N/A, 8/31/2015); spinal cord stimulator (12/19/2016); and  shunt insertion (5/31/2017).    SOCIAL HISTORY  Social History   Substance Use Topics   • Smoking status: Never Smoker   • Smokeless tobacco: Never Used   • Alcohol use Yes      Comment: Rare      History   Drug Use No       FAMILY HISTORY  Family History   Problem Relation Age of Onset   • No Known Problems Mother    • No Known Problems Father        CURRENT MEDICATIONS  No current facility-administered medications on file prior to encounter.      Current Outpatient Prescriptions on File Prior to Encounter   Medication Sig Dispense Refill   • levETIRAcetam (KEPPRA) 1000 MG tablet Take 1 Tab by mouth 2 Times a Day for 30 days. 60 Tab 0   • temazepam (RESTORIL) 15 MG Cap Take 1 Cap by mouth at bedtime as needed for up to 14 days. 14 Cap 0   • hydroxychloroquine (PLAQUENIL) 200 MG Tab Take 1.5 Tabs by " "mouth every day. 45 Tab 1   • diphenhydrAMINE (BENADRYL) 50 MG/ML Solution 50 mg by Intramuscular route every 6 hours as needed. With Toradol for migraines     • ketorolac (TORADOL) 60 MG/2ML Solution 60 mg by Intramuscular route every 6 hours as needed. With Benadryl for migraines     • predniSONE (DELTASONE) 5 MG Tab Take 5 mg by mouth every morning.     • Calcium Citrate-Vitamin D (CALCIUM + D PO) Take 1 Tab by mouth every morning.     • aspirin (ASA) 81 MG Chew Tab chewable tablet Take 81 mg by mouth every morning.     • omeprazole (PRILOSEC) 20 MG delayed-release capsule Take 1 Cap by mouth every day. 30 Cap 2   • gabapentin (NEURONTIN) 600 MG tablet Take 600 mg by mouth 3 times a day.     • duloxetine (CYMBALTA) 60 MG CPEP delayed-release capsule Take 60 mg by mouth 2 times a day.       ALLERGIES  Allergies   Allergen Reactions   • Sumatriptan Unspecified     Historical=\"Heart stops.\" Reaction  between 1995 to 1997   • Prochlorperazine Swelling     Tongue swelling. Reaction as a teen. (compazine)  Tolerated Phenergan on 02/24/15   • Vancomycin Shortness of Breath and Rash     Reaction in 2005.  D/W patient 8/31/15 - tolerated loading dose of vancomycin administered on 8/30/15 with some itching to chest with decreased infusion rate. Red Man Syndrome       PHYSICAL EXAM  VITAL SIGNS: BP (!) 161/100   Pulse (!) 123   Temp 37.2 °C (98.9 °F) (Temporal)   Resp 16   Ht 1.6 m (5' 3\")   Wt 75.3 kg (166 lb)   SpO2 100%   BMI 29.41 kg/m²     Constitutional: Well developed, Well nourished, moderate distress.   HENT: Normocephalic, Atraumatic, Oropharynx moist.   Eyes: Conjunctiva normal, No discharge. Horizontal nystagmus, PERRL 4-5 mm  Neck: Supple, No stridor, no signs of meningitis or neck tenderness    Cardiovascular: Normal heart rate, Normal rhythm, No murmurs, equal pulses.   Pulmonary: Normal breath sounds, No respiratory distress, No wheezing, No rales, No rhonchi.  Chest: No chest wall tenderness or " deformity.   Abdomen:Soft, No tenderness, No masses, no rebound, no guarding.   Back: Bilateral CVA tenderness. No vertebral point tenderness.   Musculoskeletal: No major deformities noted, No tenderness. Equal strength in upper and lower extremities but has  A bilateral tremor that does not go away with motion.   Skin: Warm, Dry, No erythema, No rash.   Neurologic: Alert & oriented x 3, Normal motor function,  No focal deficits noted. Normal finger to nose, Normal cranial nerves II-XII, No pronator drift. Equal strength in upper and lower extremities bilaterally. Ataxia with heal shin. Unable to stand to test Romberg or gate.   Psychiatric: Affect normal, Judgment normal, Mood normal.      LABS  Results for orders placed or performed during the hospital encounter of 06/13/18   CBC WITH DIFFERENTIAL   Result Value Ref Range    WBC 10.2 4.8 - 10.8 K/uL    RBC 4.60 4.20 - 5.40 M/uL    Hemoglobin 14.6 12.0 - 16.0 g/dL    Hematocrit 44.9 37.0 - 47.0 %    MCV 97.6 81.4 - 97.8 fL    MCH 31.7 27.0 - 33.0 pg    MCHC 32.5 (L) 33.6 - 35.0 g/dL    RDW 45.1 35.9 - 50.0 fL    Platelet Count 349 164 - 446 K/uL    MPV 9.7 9.0 - 12.9 fL    Neutrophils-Polys 68.40 44.00 - 72.00 %    Lymphocytes 23.70 22.00 - 41.00 %    Monocytes 6.10 0.00 - 13.40 %    Eosinophils 0.30 0.00 - 6.90 %    Basophils 0.90 0.00 - 1.80 %    Immature Granulocytes 0.60 0.00 - 0.90 %    Nucleated RBC 0.00 /100 WBC    Neutrophils (Absolute) 7.01 2.00 - 7.15 K/uL    Lymphs (Absolute) 2.42 1.00 - 4.80 K/uL    Monos (Absolute) 0.62 0.00 - 0.85 K/uL    Eos (Absolute) 0.03 0.00 - 0.51 K/uL    Baso (Absolute) 0.09 0.00 - 0.12 K/uL    Immature Granulocytes (abs) 0.06 0.00 - 0.11 K/uL    NRBC (Absolute) 0.00 K/uL   COMP METABOLIC PANEL   Result Value Ref Range    Sodium 140 135 - 145 mmol/L    Potassium 3.7 3.6 - 5.5 mmol/L    Chloride 105 96 - 112 mmol/L    Co2 22 20 - 33 mmol/L    Anion Gap 13.0 (H) 0.0 - 11.9    Glucose 67 65 - 99 mg/dL    Bun 5 (L) 8 - 22 mg/dL     Creatinine 0.60 0.50 - 1.40 mg/dL    Calcium 9.2 8.5 - 10.5 mg/dL    AST(SGOT) 20 12 - 45 U/L    ALT(SGPT) 21 2 - 50 U/L    Alkaline Phosphatase 87 30 - 99 U/L    Total Bilirubin 0.5 0.1 - 1.5 mg/dL    Albumin 4.4 3.2 - 4.9 g/dL    Total Protein 7.2 6.0 - 8.2 g/dL    Globulin 2.8 1.9 - 3.5 g/dL    A-G Ratio 1.6 g/dL   TROPONIN   Result Value Ref Range    Troponin I <0.01 0.00 - 0.04 ng/mL   LACTIC ACID   Result Value Ref Range    Lactic Acid 1.5 0.5 - 2.0 mmol/L   APTT   Result Value Ref Range    APTT 29.7 24.7 - 36.0 sec   PROTHROMBIN TIME   Result Value Ref Range    PT 14.2 12.0 - 14.6 sec    INR 1.13 0.87 - 1.13   D-DIMER   Result Value Ref Range    D-Dimer Screen <200 <250 ng/mL(D-DU)   ESTIMATED GFR   Result Value Ref Range    GFR If African American >60 >60 mL/min/1.73 m 2    GFR If Non African American >60 >60 mL/min/1.73 m 2   EKG (ER)   Result Value Ref Range    Report       [facility] Emergency [facility]    Test Date:  2018  Pt Name:    BRIGID DAWSON                 Department: ER  MRN:        [id number]                      Room:  Gender:     Female                       Technician: [id number]  :        1970                   Requested By:ER TRIAGE PROTOCOL  Order #:    [id number]                    Reading MD: ELEAZAR HAWKINS MD    Measurements  Intervals                                Axis  Rate:       118                          P:          67  VA:         156                          QRS:        -21  QRSD:       92                           T:          88  QT:         268  QTc:        376    Interpretive Statements  SINUS TACHYCARDIA  BORDERLINE LEFT AXIS DEVIATION  BORDERLINE T ABNORMALITIES, ANT-LAT LEADS  BASELINE WANDER IN LEAD(S) I,III,aVL,aVF,V2,V3,V4,V5  Compared to ECG 06/10/2018 23:59:56  No acute changes  Electronically Signed On 2018 21:40:42 PDT by ELEAZAR HAWKINS MD       All labs reviewed by me.    EKG  12 Lead EKG interpreted by me shows a normal  sinus tachycardia at a rate of 118. Left word axis. No ST elevations. No T wave inversions. Old EKG from 6/10/18 shows no significant changes. Final impression: Sinus tachycardia.    RADIOLOGY  No orders to display     The radiologist's interpretation of all radiological studies have been reviewed by me.    COURSE & MEDICAL DECISION MAKING  Pertinent Labs & Imaging studies reviewed. (See chart for details)    6:22 PM - Patient seen and examined at bedside. Patient presented here with complaints of electrical feeling twitching, an ongoing headache with photophobia, shortness of breath and weakness. Patient will be treated with morphine 4 mg IV, Toradol 30 mg IM, Zofran 4 mg IV, Reglan 10 mg IV, Benadryl 50 mg IV. The patient will receive IV fluids for tachycardia. Ordered labs, d-dimer and EKG to evaluate her symptoms. The differential diagnoses include but are not limited to: Seizure, medication reaction, CVA, TIA, Tremors.      7:15 PM I discussed the patient's case and the above findings with Dr. Hamlin (Neurology) who believes that there may be a psychogenic component to this and to try to treat her headache and try a lumbar tap. He said that if her headache can be improved he suggested outpatient follow up with Dr. Qiu.     9:32 PM I reevaluated the patient at bedside and her tremors are gone and her headache is resolved. I offered to do an LP on the patient but she instead opted to get it as an outpatient as previously planned and scheduled.     9:50 PM Patient was road tested and nursing staff reports that she was unable to stand without leaning on her and could not self ambulate.     9:58 PM I discussed the patient's case and the above findings with [facility] residents who agreed to admit the patient.    There was a improved heart rate response to IV fluids after patient reevaluation.    Medical Decision Making: At this point time I do not have a clear etiology for the patient's tremor or shaking or weakness.  I  think there may be a large psychomotor component to this.  Patient has had recent MRIs, CT and EEG all that are negative.  I do not think further imaging at this point time would be helpful.  Patient does not have a fever, no elevation in white blood cell count no neck stiffness I do not think she has meningitis.  Not finding any infectious process of the cause of her leg weakness.  Because the patient is unable to ambulate patient will be admitted for further observation and may need to be seen by psychiatry and possibly physical therapy.    DISPOSITION:  Patient will be admitted to [facility] residents in stable condition.     FINAL IMPRESSION  1. Intractable migraine without aura and with status migrainosus    2. Tremor    3. Weakness    4. Tachycardia          Francisco MADERA (Scribe), am scribing for, and in the presence of, Mauro Lopez M.D.    Electronically signed by: Francisco Armijo (Scribe), 6/13/2018    Mauro MADERA M.D. personally performed the services described in this documentation, as scribed by Francisco Armijo in my presence, and it is both accurate and complete.    The note accurately reflects work and decisions made by me.  Mauro Lopez  6/14/2018  2:53 AM

## 2018-06-14 NOTE — PROGRESS NOTES
AAO x4. Still c/o headache after dose of toradol and benadryl iv. MD aware w/ orders. Mitali po's, voiding w/out problems. Ambulated to bathroom w/ x1 staff assist. Pt having bilat lower extremity weakness. Denies n/t.

## 2018-06-14 NOTE — ED NOTES
Tried to ambulate Pt, Pt was extremely unsteady and shaking quite a bit. Didn't feel steady to be on feet for long.

## 2018-06-15 LAB
ALBUMIN SERPL BCP-MCNC: 4 G/DL (ref 3.2–4.9)
ALBUMIN/GLOB SERPL: 1.7 G/DL
ALP SERPL-CCNC: 66 U/L (ref 30–99)
ALT SERPL-CCNC: 18 U/L (ref 2–50)
ANION GAP SERPL CALC-SCNC: 10 MMOL/L (ref 0–11.9)
AST SERPL-CCNC: 13 U/L (ref 12–45)
BILIRUB SERPL-MCNC: 0.4 MG/DL (ref 0.1–1.5)
BUN SERPL-MCNC: 8 MG/DL (ref 8–22)
CALCIUM SERPL-MCNC: 8.3 MG/DL (ref 8.5–10.5)
CHLORIDE SERPL-SCNC: 103 MMOL/L (ref 96–112)
CO2 SERPL-SCNC: 25 MMOL/L (ref 20–33)
CREAT SERPL-MCNC: 0.58 MG/DL (ref 0.5–1.4)
ERYTHROCYTE [DISTWIDTH] IN BLOOD BY AUTOMATED COUNT: 42.1 FL (ref 35.9–50)
FERRITIN SERPL-MCNC: 31.4 NG/ML (ref 10–291)
GLOBULIN SER CALC-MCNC: 2.4 G/DL (ref 1.9–3.5)
GLUCOSE SERPL-MCNC: 102 MG/DL (ref 65–99)
HCT VFR BLD AUTO: 42.1 % (ref 37–47)
HGB BLD-MCNC: 14.2 G/DL (ref 12–16)
MCH RBC QN AUTO: 32.1 PG (ref 27–33)
MCHC RBC AUTO-ENTMCNC: 33.7 G/DL (ref 33.6–35)
MCV RBC AUTO: 95 FL (ref 81.4–97.8)
PLATELET # BLD AUTO: 345 K/UL (ref 164–446)
PMV BLD AUTO: 9.7 FL (ref 9–12.9)
POTASSIUM SERPL-SCNC: 4.1 MMOL/L (ref 3.6–5.5)
PROT SERPL-MCNC: 6.4 G/DL (ref 6–8.2)
RBC # BLD AUTO: 4.43 M/UL (ref 4.2–5.4)
SODIUM SERPL-SCNC: 138 MMOL/L (ref 135–145)
WBC # BLD AUTO: 12.6 K/UL (ref 4.8–10.8)

## 2018-06-15 PROCEDURE — A9270 NON-COVERED ITEM OR SERVICE: HCPCS | Performed by: STUDENT IN AN ORGANIZED HEALTH CARE EDUCATION/TRAINING PROGRAM

## 2018-06-15 PROCEDURE — 700111 HCHG RX REV CODE 636 W/ 250 OVERRIDE (IP): Performed by: STUDENT IN AN ORGANIZED HEALTH CARE EDUCATION/TRAINING PROGRAM

## 2018-06-15 PROCEDURE — 85027 COMPLETE CBC AUTOMATED: CPT

## 2018-06-15 PROCEDURE — 4A00X4Z MEASUREMENT OF CENTRAL NERVOUS ELECTRICAL ACTIVITY, EXTERNAL APPROACH: ICD-10-PCS | Performed by: PSYCHIATRY & NEUROLOGY

## 2018-06-15 PROCEDURE — 80053 COMPREHEN METABOLIC PANEL: CPT

## 2018-06-15 PROCEDURE — 99233 SBSQ HOSP IP/OBS HIGH 50: CPT | Performed by: INTERNAL MEDICINE

## 2018-06-15 PROCEDURE — 86698 HISTOPLASMA ANTIBODY: CPT

## 2018-06-15 PROCEDURE — 82728 ASSAY OF FERRITIN: CPT

## 2018-06-15 PROCEDURE — 83516 IMMUNOASSAY NONANTIBODY: CPT

## 2018-06-15 PROCEDURE — 700105 HCHG RX REV CODE 258: Performed by: STUDENT IN AN ORGANIZED HEALTH CARE EDUCATION/TRAINING PROGRAM

## 2018-06-15 PROCEDURE — 700102 HCHG RX REV CODE 250 W/ 637 OVERRIDE(OP): Performed by: STUDENT IN AN ORGANIZED HEALTH CARE EDUCATION/TRAINING PROGRAM

## 2018-06-15 PROCEDURE — 82784 ASSAY IGA/IGD/IGG/IGM EACH: CPT

## 2018-06-15 PROCEDURE — 770006 HCHG ROOM/CARE - MED/SURG/GYN SEMI*

## 2018-06-15 PROCEDURE — 95951 EEG: CPT | Mod: 52

## 2018-06-15 RX ORDER — RISPERIDONE 0.5 MG/1
1 TABLET ORAL EVERY MORNING
Status: DISCONTINUED | OUTPATIENT
Start: 2018-06-16 | End: 2018-06-19 | Stop reason: HOSPADM

## 2018-06-15 RX ORDER — RISPERIDONE 0.5 MG/1
2 TABLET ORAL EVERY EVENING
Status: DISCONTINUED | OUTPATIENT
Start: 2018-06-15 | End: 2018-06-19 | Stop reason: HOSPADM

## 2018-06-15 RX ORDER — GABAPENTIN 300 MG/1
900 CAPSULE ORAL 3 TIMES DAILY
Status: DISCONTINUED | OUTPATIENT
Start: 2018-06-15 | End: 2018-06-19 | Stop reason: HOSPADM

## 2018-06-15 RX ORDER — FERROUS SULFATE 325(65) MG
325 TABLET ORAL
Status: DISCONTINUED | OUTPATIENT
Start: 2018-06-16 | End: 2018-06-19 | Stop reason: HOSPADM

## 2018-06-15 RX ADMIN — CEPHALEXIN 500 MG: 500 CAPSULE ORAL at 17:20

## 2018-06-15 RX ADMIN — DIPHENHYDRAMINE HYDROCHLORIDE 50 MG: 50 INJECTION, SOLUTION INTRAMUSCULAR; INTRAVENOUS at 18:32

## 2018-06-15 RX ADMIN — CEPHALEXIN 500 MG: 500 CAPSULE ORAL at 00:04

## 2018-06-15 RX ADMIN — KETOROLAC TROMETHAMINE 60 MG: 30 INJECTION, SOLUTION INTRAMUSCULAR at 18:33

## 2018-06-15 RX ADMIN — LEVETIRACETAM 1000 MG: 500 TABLET ORAL at 09:03

## 2018-06-15 RX ADMIN — PREDNISONE 5 MG: 5 TABLET ORAL at 09:02

## 2018-06-15 RX ADMIN — HYDROMORPHONE HYDROCHLORIDE 8 MG: 4 TABLET ORAL at 22:50

## 2018-06-15 RX ADMIN — CEPHALEXIN 500 MG: 500 CAPSULE ORAL at 12:57

## 2018-06-15 RX ADMIN — ASPIRIN 81 MG: 81 TABLET, CHEWABLE ORAL at 09:02

## 2018-06-15 RX ADMIN — HYDROMORPHONE HYDROCHLORIDE 8 MG: 4 TABLET ORAL at 10:48

## 2018-06-15 RX ADMIN — KETOROLAC TROMETHAMINE 60 MG: 30 INJECTION, SOLUTION INTRAMUSCULAR at 00:04

## 2018-06-15 RX ADMIN — HYDROMORPHONE HYDROCHLORIDE 8 MG: 4 TABLET ORAL at 16:46

## 2018-06-15 RX ADMIN — GABAPENTIN 600 MG: 300 CAPSULE ORAL at 09:02

## 2018-06-15 RX ADMIN — KETOROLAC TROMETHAMINE 60 MG: 30 INJECTION, SOLUTION INTRAMUSCULAR at 06:23

## 2018-06-15 RX ADMIN — RISPERIDONE 2 MG: 0.5 TABLET ORAL at 20:18

## 2018-06-15 RX ADMIN — DULOXETINE HYDROCHLORIDE 60 MG: 60 CAPSULE, DELAYED RELEASE ORAL at 20:19

## 2018-06-15 RX ADMIN — DIPHENHYDRAMINE HYDROCHLORIDE 50 MG: 50 INJECTION, SOLUTION INTRAMUSCULAR; INTRAVENOUS at 06:23

## 2018-06-15 RX ADMIN — RISPERIDONE 1 MG: 0.5 TABLET ORAL at 09:02

## 2018-06-15 RX ADMIN — HYDROMORPHONE HYDROCHLORIDE 8 MG: 4 TABLET ORAL at 04:43

## 2018-06-15 RX ADMIN — KETOROLAC TROMETHAMINE 60 MG: 30 INJECTION, SOLUTION INTRAMUSCULAR at 12:57

## 2018-06-15 RX ADMIN — DULOXETINE HYDROCHLORIDE 60 MG: 60 CAPSULE, DELAYED RELEASE ORAL at 09:02

## 2018-06-15 RX ADMIN — GABAPENTIN 900 MG: 300 CAPSULE ORAL at 20:18

## 2018-06-15 RX ADMIN — CEPHALEXIN 500 MG: 500 CAPSULE ORAL at 04:43

## 2018-06-15 RX ADMIN — DIPHENHYDRAMINE HYDROCHLORIDE 50 MG: 50 INJECTION, SOLUTION INTRAMUSCULAR; INTRAVENOUS at 00:04

## 2018-06-15 RX ADMIN — SODIUM CHLORIDE: 9 INJECTION, SOLUTION INTRAVENOUS at 13:08

## 2018-06-15 RX ADMIN — CEPHALEXIN 500 MG: 500 CAPSULE ORAL at 23:28

## 2018-06-15 RX ADMIN — GABAPENTIN 900 MG: 300 CAPSULE ORAL at 14:49

## 2018-06-15 RX ADMIN — OMEPRAZOLE 20 MG: 20 CAPSULE, DELAYED RELEASE ORAL at 09:02

## 2018-06-15 RX ADMIN — HYDROXYCHLOROQUINE SULFATE 300 MG: 200 TABLET, FILM COATED ORAL at 09:02

## 2018-06-15 RX ADMIN — LEVETIRACETAM 1000 MG: 500 TABLET ORAL at 20:19

## 2018-06-15 RX ADMIN — DIPHENHYDRAMINE HYDROCHLORIDE 50 MG: 50 INJECTION, SOLUTION INTRAMUSCULAR; INTRAVENOUS at 12:58

## 2018-06-15 RX ADMIN — STANDARDIZED SENNA CONCENTRATE AND DOCUSATE SODIUM 2 TABLET: 8.6; 5 TABLET, FILM COATED ORAL at 09:03

## 2018-06-15 ASSESSMENT — ENCOUNTER SYMPTOMS
NERVOUS/ANXIOUS: 1
ABDOMINAL PAIN: 0
SPEECH CHANGE: 0
DOUBLE VISION: 0
NAUSEA: 0
INSOMNIA: 1
BLOOD IN STOOL: 0
COUGH: 0
SINUS PAIN: 0
TREMORS: 0
NECK PAIN: 0
FOCAL WEAKNESS: 0
WEAKNESS: 0
PHOTOPHOBIA: 0
DIAPHORESIS: 0
BRUISES/BLEEDS EASILY: 0
BACK PAIN: 1
PALPITATIONS: 0
MYALGIAS: 1
DIZZINESS: 0
EYE PAIN: 0
VOMITING: 0
TINGLING: 0
SHORTNESS OF BREATH: 0
HEMOPTYSIS: 0
HEADACHES: 1
BLURRED VISION: 0

## 2018-06-15 ASSESSMENT — PAIN SCALES - GENERAL
PAINLEVEL_OUTOF10: 8
PAINLEVEL_OUTOF10: 7
PAINLEVEL_OUTOF10: 8
PAINLEVEL_OUTOF10: 9
PAINLEVEL_OUTOF10: 7
PAINLEVEL_OUTOF10: 9
PAINLEVEL_OUTOF10: 8
PAINLEVEL_OUTOF10: 2

## 2018-06-15 NOTE — CARE PLAN
Problem: Communication  Goal: The ability to communicate needs accurately and effectively will improve  Outcome: PROGRESSING AS EXPECTED  A&Ox4 and using call light to notify staff of any needs/concerns.     Problem: Knowledge Deficit  Goal: Knowledge of disease process/condition, treatment plan, diagnostic tests, and medications will improve  Outcome: PROGRESSING AS EXPECTED  Pt. educated about the POC and current medication regimen. All questions/concerns addressed at this time.

## 2018-06-15 NOTE — PROGRESS NOTES
Internal Medicine Interval Note  Note Author: Freddy Chou M.D.     Name Enedelia Pang     1970   Age/Sex 47 y.o. female   MRN [id number]   Code Status full     After 5PM or if no immediate response to page, please call for cross-coverage  Attending/Team: /rianna See Patient List for primary contact information  Call [phone] to page    1st Call - Day Intern (R1):    2nd Call - Day Sr. Resident (R2/R3):   .         Reason for interval visit  (Principal Problem)   Complicated migraine    Interval Problem Daily Status Update  (24 hours)   Patient reported to having improvement of her  migrainous headache, no nausea or vomiting,seen.No bowel or bladder problems .Patient has been ambulating with the help of a walker on the villalobos.  -On follow-up with neurology and had MRI of the brain and EEG, which revealed ventriculostomy shunt catheter in place with no hydrocephalus and mild white matter change.  An EEG study on  is within normal limits  -Patient has seronegative arthritis and is on follow-up with rheumatology as outpatient for workup for vasculitis with ESR CRP MG,  C3/C4, ANCA serologies, and antiphospholipid antibodies negative.  Patient is being followed up by rheumatology and is seen by ..  Patient seen by neurology who recommended MR C of the spine and psychiatric consult, and to hold, lumbar puncture as it is low yield with no clinical signs of nuchal rigidity or significant headache or white blood cell count elevation,and was seen by neurology today,who advised discharge and outpatient follow up,after completion of MR-C spine and psychiatry  recommendations and follow up for pain management  -Patient awaiting MRC-spine, after confirmation, from neurology, about the safety of the V-P Shunt, and if the  shunt is programmable.  Patient also, getting a video EEG by neurology, with a total recording of 26 minutes showing no clinical events  of seizures during the study.  Awaiting scan for planning discharge, Pain management doctor will be consulted, to manage pain meds.    Review of Systems   Constitutional: Negative for diaphoresis and malaise/fatigue.   HENT: Negative for congestion, nosebleeds and sinus pain.    Eyes: Negative for blurred vision, double vision, photophobia and pain.   Respiratory: Negative for cough, hemoptysis and shortness of breath.    Cardiovascular: Negative for chest pain, palpitations and leg swelling.   Gastrointestinal: Negative for abdominal pain, blood in stool, nausea and vomiting.   Musculoskeletal: Positive for back pain and myalgias. Negative for joint pain and neck pain.   Skin: Negative for itching and rash.   Neurological: Positive for headaches. Negative for dizziness, tingling, tremors, speech change, focal weakness and weakness.   Endo/Heme/Allergies: Does not bruise/bleed easily.   Psychiatric/Behavioral: The patient is nervous/anxious and has insomnia.        Consultants/Specialty  Neurology.  psychiatry  PCP:lEliott Power M.D.      Disposition  Inpatient for home health    Quality Measures  Quality-Core Measures   Reviewed items::  Labs reviewed, Medications reviewed and Radiology images reviewed  DVT prophylaxis - mechanical:  SCDs          Physical Exam       Vitals:    06/15/18 0400 06/15/18 0726 06/15/18 1540 06/15/18 1600   BP: 105/70 (!) 99/66 (!) 98/57    Pulse: 97 96 (!) 114    Resp: 16 12 12 16   Temp: 36.1 °C (97 °F) 36.1 °C (97 °F) 36.6 °C (97.8 °F)    TempSrc:       SpO2: 96% 95% 90%    Weight:       Height:         Body mass index is 31.59 kg/m².    Oxygen Therapy:  Pulse Oximetry: 90 %, O2 (LPM): 2, O2 Delivery: Oxymask    Physical Exam   Constitutional: She is oriented to person, place, and time and well-developed, well-nourished, and in no distress.   HENT:   Head: Normocephalic and atraumatic.   Eyes: Conjunctivae and EOM are normal. Pupils are equal, round, and reactive to light.    Neck: Normal range of motion. Neck supple.   Cardiovascular: Normal rate, regular rhythm and normal heart sounds.    Pulmonary/Chest: Effort normal and breath sounds normal.   Abdominal: Soft. Bowel sounds are normal. She exhibits no distension. There is no tenderness. There is no rebound and no guarding.   Musculoskeletal: Normal range of motion. She exhibits no edema or deformity.   Neurological: She is alert and oriented to person, place, and time. She displays normal reflexes. No cranial nerve deficit. She exhibits normal muscle tone.   Skin: Skin is warm and dry.   Psychiatric: Affect normal.   Mood depressed         Lab Data Review:         6/15/2018  3:38 PM    Recent Labs      06/13/18   1952  06/15/18   0300   SODIUM  140  138   POTASSIUM  3.7  4.1   CHLORIDE  105  103   CO2  22  25   BUN  5*  8   CREATININE  0.60  0.58   CALCIUM  9.2  8.3*       Recent Labs      06/13/18   1952  06/15/18   0300   ALTSGPT  21  18   ASTSGOT  20  13   ALKPHOSPHAT  87  66   TBILIRUBIN  0.5  0.4   GLUCOSE  67  102*       Recent Labs      06/13/18   1952  06/13/18   2005  06/15/18   0300   RBC  4.60   --   4.43   HEMOGLOBIN  14.6   --   14.2   HEMATOCRIT  44.9   --   42.1   PLATELETCT  349   --   345   PROTHROMBTM   --   14.2   --    APTT   --   29.7   --    INR   --   1.13   --    FERRITIN   --    --   31.4       Recent Labs      06/13/18   1952  06/15/18   0300   WBC  10.2  12.6*   NEUTSPOLYS  68.40   --    LYMPHOCYTES  23.70   --    MONOCYTES  6.10   --    EOSINOPHILS  0.30   --    BASOPHILS  0.90   --    ASTSGOT  20  13   ALTSGPT  21  18   ALKPHOSPHAT  87  66   TBILIRUBIN  0.5  0.4           Assessment/Plan     * Headache and focal deficits, likely secondary to Complicated migraine- (present on admission)   Assessment & Plan    Hx of intractable migraine, with unusual presentation on exam.   Questionable hx of psychogenic seizure, but also with hx of pseudotumor cerebri (s/p shunt) and record of CVA in 2007.   Notes leg  weakness with indifference, and vague, changing pain complaints.   Possible ddx including complicated migraine, psychogenic seizure, or psychiatric problem;   however, not excluding potential seizure, infection, or medication reaction.   Last MRI - 5/29/18.  Within normal limits,-reveals ventriculostomy shunt catheter in place with no hydrocephalus and mild white matter change  Last EEG - 5/30/18.  Within normal limits  Plan:  Started on ergotamine 1 mg IV and p.o. Dilaudid 8 mg every 8 hours as needed for pain.  Patient gets good relief of pain on above medications.  Continue home medications of Benadryl and Toradol injections.  Continue keppra, per prior recommendation from neuro.   IR-lumbar puncture, if recommended.  -MR C spine ordered along with psychiatric consultation as per neurology requirements,patient counseled about psychiatry consult  Neurology note seen, can be discharged,for outpatient follow up once,  MR C spine,normal  Psychiatry follow up note seen,have increased the Resperidal and gabapentin to Alleviate the pain and anxiety.   Pain management specialist of  patient to be consulted .  Continue ASA-81.        Cellulitis of finger - vs, vasculitis or calciphylaxis   Assessment & Plan    Patient with swelling and redness of left middle finger.  -Patient has a history of recurrent swellings of both hands in the past.  Being seen and followed by rheumatology, could be seronegative rheumatoid arthritis, as previous tests are all negative.  Plan  Continue prednisone 5 mg and Plaquenil as scheduled.  We will change antibiotic from clindamycin to Keflex and and monitor blood cultures for any significant growth.  X-rays, left hand and review.  Clinical examination shows low probability of cellulitis as the classic symptoms of heat increase localized temperature and tenderness are absent.   Imaging reveals periarticular swelling with demineralisation,with blood cultures revealing no growth.  Talked to MRI  for details of the  MR-C Spine scan and said that they would do it after D/w Neurology if the  Shunt is programmable or not.  -will follow up and monitor.        Rheumatoid arthritis (HCC)- (present on admission)   Assessment & Plan    continuing on home dose prednisone and plaquenil.

## 2018-06-15 NOTE — PROGRESS NOTES
Pt. alert and oriented x 4 at this time. Pt. able to move all extremities with equal strength bilaterally. Plan of care discussed. Call light in reach. Hourly rounding in place

## 2018-06-15 NOTE — PROGRESS NOTES
"S- benadryl, Toradol, and dilaudid work for me.  Headache improved. No other complaints.     O-   Allergies:   Allergies   Allergen Reactions   • Sumatriptan Unspecified     Historical=\"Heart stops.\" Reaction  between 1995 to 1997   • Prochlorperazine Swelling     Tongue swelling. Reaction as a teen. (compazine)  Tolerated Phenergan on 02/24/15   • Vancomycin Shortness of Breath and Rash     Reaction in 2005.  D/W patient 8/31/15 - tolerated loading dose of vancomycin administered on 8/30/15 with some itching to chest with decreased infusion rate. Red Man Syndrome         Current Facility-Administered Medications:   •  gabapentin (NEURONTIN) capsule 900 mg, 900 mg, Oral, TID, ELLIS MayoO., 900 mg at 06/15/18 1449  •  risperiDONE (RISPERDAL) tablet 2 mg, 2 mg, Oral, Q EVENING, Alina Leon D.O.  •  [START ON 6/16/2018] risperiDONE (RISPERDAL) tablet 1 mg, 1 mg, Oral, QAM, LEYLA Mayo.KATHY.  •  senna-docusate (PERICOLACE or SENOKOT S) 8.6-50 MG per tablet 2 Tab, 2 Tab, Oral, BID, 2 Tab at 06/15/18 0903 **AND** polyethylene glycol/lytes (MIRALAX) PACKET 1 Packet, 1 Packet, Oral, QDAY PRN **AND** magnesium hydroxide (MILK OF MAGNESIA) suspension 30 mL, 30 mL, Oral, QDAY PRN **AND** bisacodyl (DULCOLAX) suppository 10 mg, 10 mg, Rectal, QDAY PRN, Herber Bansal M.D.  •  NS infusion, , Intravenous, Continuous, Herber Bansal M.D., Last Rate: 83 mL/hr at 06/15/18 1308  •  aspirin (ASA) chewable tab 81 mg, 81 mg, Oral, QAM, Herber Bansal M.D., 81 mg at 06/15/18 0902  •  levETIRAcetam (KEPPRA) tablet 1,000 mg, 1,000 mg, Oral, BID, Herber Bansal M.D., 1,000 mg at 06/15/18 0903  •  omeprazole (PRILOSEC) capsule 20 mg, 20 mg, Oral, DAILY, Herber Bansal M.D., 20 mg at 06/15/18 0902  •  predniSONE (DELTASONE) tablet 5 mg, 5 mg, Oral, QA, Herber Bansal M.D., 5 mg at 06/15/18 0902  •  DULoxetine (CYMBALTA) capsule 60 mg, 60 mg, Oral, BID, Herber Bansal M.D., 60 mg at " 06/15/18 0902  •  hydroxychloroquine (PLAQUENIL) tablet 300 mg, 300 mg, Oral, DAILY, Herber Bansal M.D., 300 mg at 06/15/18 0902  •  cephALEXin (KEFLEX) capsule 500 mg, 500 mg, Oral, Q6HRS, DANIEL Spears.DFrederick, 500 mg at 06/15/18 1257  •  HYDROmorphone (DILAUDID) tablet 8 mg, 8 mg, Oral, Q6HRS PRN, Freddy Blackerapanjamie, M.D., 8 mg at 06/15/18 1048  •  ketorolac (TORADOL) injection 60 mg, 60 mg, Intramuscular, Q6HRS PRN, Freddy ARAUZ Veerapanjamie, M.D., 60 mg at 06/15/18 1257  •  diphenhydrAMINE (BENADRYL) injection 50 mg, 50 mg, Intravenous, Q6HRS PRN, Freddy Chou M.DFrederick, 50 mg at 06/15/18 1258      PHYSICAL EXAM    Vitals:    06/14/18 2000 06/14/18 2341 06/15/18 0400 06/15/18 0726   BP: 124/79 129/85 105/70 (!) 99/66   Pulse: 91 86 97 96   Resp: 16 16 16 12   Temp: 36.8 °C (98.2 °F) 36.8 °C (98.3 °F) 36.1 °C (97 °F) 36.1 °C (97 °F)   TempSrc:       SpO2: 97% 95% 96% 95%   Weight:       Height:           Head/Neck: NCAT no meningismus neg kernig neg brudzinski. No obvious mass or heard bruit. No tender arteries or lost pulses.  No rash of head or neck.  Skin: Warm, dry, intact. No rashes observed head/neck or body  Eyes/Funduscopic: Optic discs flat with no evidence of papilledema or pallor. Normal posterior segments.     Mental Status: Awake, alert, oriented x 3. Names/repeats/fluent/follows commands. No neglect/extinction. Attention and concentration, recent & remote memory, Fund of Knowledge wnl.  Cranial Nerves: CN II-XII intact. PERRL.   No afferent pupillary defect. EOM full. VF full. sym grimace & eye closure. No nystagmus.                           Motor: strength/bulk/tone wnl.  intact and sym, no abn mvmts        Better coop with functional weakness of legs today, odd shaking but firm holds  Sensory: Intact light touch & sharp  Coordination: finger to nose & heel to shin intact.   DTR's: intact/sym. no clonus. Toes mute.  Gait/Station: n/a fall concern                NIHSS:  0  Strongly functional exam         Labs:  Recent Labs      06/13/18   1952  06/15/18   0300   WBC  10.2  12.6*   RBC  4.60  4.43   HEMOGLOBIN  14.6  14.2   HEMATOCRIT  44.9  42.1   MCV  97.6  95.0   MCH  31.7  32.1   MCHC  32.5*  33.7   RDW  45.1  42.1   PLATELETCT  349  345   MPV  9.7  9.7     Recent Labs      06/13/18   1952  06/15/18   0300   SODIUM  140  138   POTASSIUM  3.7  4.1   CHLORIDE  105  103   CO2  22  25   GLUCOSE  67  102*   BUN  5*  8   CREATININE  0.60  0.58   CALCIUM  9.2  8.3*     Recent Labs      06/13/18 2005   APTT  29.7   INR  1.13         Recent Labs      06/13/18 1952   TROPONINI  <0.01         Recent Labs      06/13/18   1952  06/15/18   0300   SODIUM  140  138   POTASSIUM  3.7  4.1   CHLORIDE  105  103   CO2  22  25   GLUCOSE  67  102*   BUN  5*  8     Recent Labs      06/13/18   1952  06/15/18   0300   SODIUM  140  138   POTASSIUM  3.7  4.1   CHLORIDE  105  103   CO2  22  25   BUN  5*  8   CREATININE  0.60  0.58   CALCIUM  9.2  8.3*     Recent Labs      06/13/18 2005   APTT  29.7   INR  1.13     Results for orders placed or performed during the hospital encounter of 10/29/14   2-D ECHO W/DOPPLER (ECHOCARDIOGRAM COMP W/O CONT)   Result Value Ref Range    Eject.Frac. MOD BP 72.63     Eject.Frac. MOD 4C 71.9     Eject.Frac. MOD 2C 69.76               Imaging: neuroimaging reviewed and directly visualized by me  DX-HAND 3+ LEFT   Final Result      1.  Mild periarticular demineralization which is unchanged from the prior study.   2.  Apparent diffuse soft tissue swelling about the hand and digits.   3.  No acute osseous abnormality.      MR-CERVICAL SPINE-W/O    (Results Pending)     MRB 5/30 Impression       1.  Ventriculostomy shunt catheter in place. No hydrocephalus.  2.  Mild white matter changes  3.  No evidence of mesial temporal sclerosis, gray matter heterotopia or cortical dysplasia or intracranial mass   Reading Provider Reading Date   Ben Li M.D. May 30, 2018          Assessment/Plan:    TREMORS SUSPECT FUNCTIONAL, HX OF LESLIE ON EEG RECENTLY, FUNCTIONAL EXAM     HEADACHES, CHRONIC SINCE 2ND DECADE, NO PAPILLEDEMA TO SUGGEST IIH W/ VPS STUDIES WNL. JABS & JOLTS ARE COMMON WITH MIGRAINE, BUT HERS ARE ATYPICAL FULL CONSCIOUSNESS & HEAD & BODY MAY BE FUNCTIONAL COMPLAINT. RECENT MRI PAST VISIT NO ACUTE EXPLANATORY FINDINGS, NO VENTRICULOMEGALY.      LP low yield no nuchal rigid no sig headache, no WBC elevation- hold this study  EEG (5d eeg with ana BROWN showed LESLIE per her report no epileptic findings)  Continue keppra for possible occult epilepsy  MR C spine  Psych consult appreciated  Consider pain management evaluation may have drug seeking element     Discussed seizure hygiene, triggers, and AED compliance/side effects/teratogenicity if female    Report to [employer] for driving restriction; advised no activities where LOC a danger until neuro MD clears as otpt    No further neuro workup as inpatient if aforementioned studies WNL & regains/maintains neuro baseline.  Neuro sign off, reconsult PRN.  F/u otpt Neuro ASAP.      Viktor Hamlin M.D.  , Neurohospitalist & Stroke  Clinical Professor, [employer]  Diplomate, Neurology & Neuroimaging

## 2018-06-15 NOTE — PROGRESS NOTES
Assumed care of pt. Able to make needs known. A/Ox4. SBA with FWW. Regular diet. Right chest port accessed. Pain control. Bed alarm on. Call light in reach, bed in low position, will continue hourly rounding.

## 2018-06-15 NOTE — CARE PLAN
Problem: Communication  Goal: The ability to communicate needs accurately and effectively will improve    Intervention: [place] patient and significant other/support system to call light to alert staff of needs  Able to make needs known, instructed to call for assistance      Problem: Pain Management  Goal: Pain level will decrease to patient's comfort goal    Intervention: Educate and implement non-pharmacologic comfort measures. Examples: relaxation, distration, play therapy, activity therapy, massage, etc.  No current complaints of pain, will monitor for changes
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
## 2018-06-15 NOTE — PSYCHIATRY
BRIEF PSYCHIATRIC CONSULT NOTE: patient seen, full note to follow.  -Legal Hold:not indicated  -Sitter:no  -Restrictions: none  -Orders Placed: Increasing Risperdal to 1mg QAM + 2mg PO QHS, mood stabilization/psychosis, Increasing Gabapentin to 900mg PO TID as this also can help with anxiety and her neuropathic type pains, if patient continues to report anxiety, may either increase Gabapentin if tolerated or trial Buspar  -Plan:continue to follow       Alina Leon, DO PGY2 Psychiatry Resident

## 2018-06-15 NOTE — DISCHARGE PLANNING
Anticipated Discharge Disposition: Home    Action: LSW met with pt bedside. Pt lives with her spouse, two teen age children, her son's 17 yr old gf, who is pregnant and an 18 yr old friend. Pt lives in a 1 story home with 4 stairs at the entrance.    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    Pt has a hx of mental health with at least 2 prior legal holds here at [facility].  Pt takes medication for depression. Pt does not remember who she has an appointment with to see .      This is as far as I got with pt as Dr. eLon with [facility] entered the room to conduct psych eval. This worker excused my self at that time    Barriers to Discharge: None    Plan: Pt to dc home  Care Transition Team Assessment    Information Source  Orientation : Oriented x 4  Information Given By: Patient  Informant's Name: Enedelia Pang  Who is responsible for making decisions for patient? : Patient    Elopement Risk  Legal Hold: No  Ambulatory or Self Mobile in Wheelchair: Yes  Disoriented: No  Psychiatric Symptoms: None  History of Wandering: No  Elopement this Admit: No  Vocalizing Wanting to Leave: No  Displays Behaviors, Body Language Wanting to Leave: No-Not at Risk for Elopement  Elopement Risk: Not at Risk for Elopement    Interdisciplinary Discharge Planning  Does Admitting Nurse Feel This Could be a Complex Discharge?: No  Primary Care Physician: Dr. Elliott Power  Lives with - Patient's Self Care Capacity: Spouse, Child Less than 18 Years of Age, Adult Children  Patient or legal guardian wants to designate a caregiver (see row info): No  Support Systems: Children, Spouse / Significant Other  Housing / Facility: 1 Story House  Do You Take your Prescribed Medications Regularly: Yes  Able to Return to Previous ADL's: Yes  Mobility Issues: No  Prior Services: Home-Independent  Patient Expects to be Discharged to:: home  Assistance Needed: Unknown at this Time  Durable Medical Equipment: Unknown    Discharge Preparedness  What is your  plan after discharge?: Home with help  What are your discharge supports?: Child, Spouse  Prior Functional Level: Ambulatory, Independent with Activities of Daily Living  Difficulity with ADLs: None    Functional Assesment  Prior Functional Level: Ambulatory, Independent with Activities of Daily Living    Finances  Financial Barriers to Discharge: No  Prescription Coverage: Yes    Vision / Hearing Impairment  Vision Impairment : Yes  Right Eye Vision: Wears Glasses, Impaired  Left Eye Vision: Wears Glasses, Impaired  Hearing Impairment : No    Values / Beliefs / Concerns  Values / Beliefs Concerns : No    Advance Directive  Advance Directive?: None    Domestic Abuse  Have you ever been the victim of abuse or violence?: Not Sure  Physical Abuse or Sexual Abuse: No  Verbal Abuse or Emotional Abuse: No  Possible Abuse Reported to:: Not Applicable    Psychological Assessment  History of Substance Abuse: None  History of Psychiatric Problems: Yes  Non-compliant with Treatment: No  Newly Diagnosed Illness: No    Discharge Risks or Barriers  Discharge risks or barriers?: No    Anticipated Discharge Information  Anticipated discharge disposition: Home  Discharge Address: [facility] [address] [place]  Discharge Contact Phone Number: [phone]

## 2018-06-15 NOTE — PROGRESS NOTES
Internal Medicine Interval Note  Note Author: Freddy Chou M.D.     Name Enedelia Pang     1970   Age/Sex 47 y.o. female   MRN [id number]   Code Status full     After 5PM or if no immediate response to page, please call for cross-coverage  Attending/Team: /rianna See Patient List for primary contact information  Call [phone] to page    1st Call - Day Intern (R1):    2nd Call - Day Sr. Resident (R2/R3):   .         Reason for interval visit  (Principal Problem)   Complicated migraine    Interval Problem Daily Status Update  (24 hours)   Patient reported to having worsening of  migrainous headache with occasional diplopia but no nausea or vomiting, and mild weakness of both lower limbs which has been persistent since the time of her admission/no bowel or bladder problems seen on the villalobos.  -On follow-up with neurology and had MRI of the brain and EEG, which revealed ventriculostomy shunt catheter in place with no hydrocephalus and mild white matter change.  An EEG study on  is within normal limits  -Patient has IR lumbar puncture scheduled for 6/15/18.  -Patient has seronegative arthritis and is on follow-up with rheumatology as outpatient for workup for vasculitis with ESR CRP MG,  C3/C4, ANCA serologies, and antiphospholipid antibodies negative.  Patient is being followed up by rheumatology and is seen by ..  Patient seen by neurology who recommended MR C of the spine and psychiatric consul, and to hold, lumbar puncture as it is low yield with no clinical signs of nuchal rigidity or significant headache or white blood cell count elevation.  -Ordered MR-C-spine/psychiatric consult per neurology recommendations.  Review of Systems   Constitutional: Negative for chills, fever and malaise/fatigue.   HENT: Negative for hearing loss, nosebleeds and sinus pain.    Eyes: Negative for double vision and photophobia.   Respiratory: Negative for cough,  hemoptysis and shortness of breath.    Cardiovascular: Negative for chest pain and palpitations.   Gastrointestinal: Negative for abdominal pain, nausea and vomiting.   Genitourinary: Negative for frequency, hematuria and urgency.   Musculoskeletal: Positive for back pain and joint pain. Negative for neck pain.   Neurological: Positive for focal weakness and headaches. Negative for tremors, sensory change, speech change, seizures and weakness.   Psychiatric/Behavioral: Positive for depression. Negative for hallucinations. The patient is not nervous/anxious.        Consultants/Specialty  Neurology  PCP: Elliott Power M.D.      Disposition  Inpatient    Quality Measures  Quality-Core Measures   Reviewed items::  Labs reviewed and Medications reviewed  DVT prophylaxis - mechanical:  SCDs          Physical Exam       Vitals:    06/14/18 0750 06/14/18 1100 06/14/18 1615 06/14/18 2000   BP: 115/60 108/66 119/78 124/79   Pulse: 84 94 97 91   Resp: 14 16 16 16   Temp: 36.7 °C (98 °F) 36.1 °C (97 °F) 36.9 °C (98.4 °F) 36.8 °C (98.2 °F)   TempSrc:       SpO2: 97% 98% 97% 97%   Weight:       Height:         Body mass index is 31.59 kg/m². Weight: 80.9 kg (178 lb 5.6 oz)  Oxygen Therapy:  Pulse Oximetry: 97 %, O2 (LPM): 2, O2 Delivery: Oxymask    Physical Exam   Constitutional: She is well-developed, well-nourished, and in no distress.   HENT:   Head: Normocephalic and atraumatic.   Eyes: Pupils are equal, round, and reactive to light.   Neck: Normal range of motion. No JVD present. No tracheal deviation present. No thyromegaly present.   Cardiovascular: Normal rate, regular rhythm and normal heart sounds.  Exam reveals no friction rub.    No murmur heard.  Pulmonary/Chest: Effort normal and breath sounds normal. No stridor.   Abdominal: Soft. Bowel sounds are normal. She exhibits no distension. There is no tenderness. There is no rebound and no guarding.   Musculoskeletal: Normal range of motion. She exhibits no edema,  tenderness or deformity.   Neurological:   Awake alert and oriented both recent and remote memory intact cranial nerves intact.  Motor strength bulk and tone within normal limits.  Sensations intact to light touch.  Coordination with finger-to-nose and heel-to-shin intact.  DTRs 2+ intact and symmetric.  Gait and rectal examination deferred per patient's wishes.       Lab Data Review:         6/14/2018  6:04 PM    Recent Labs      06/13/18 1952   SODIUM  140   POTASSIUM  3.7   CHLORIDE  105   CO2  22   BUN  5*   CREATININE  0.60   CALCIUM  9.2       Recent Labs      06/13/18 1952   ALTSGPT  21   ASTSGOT  20   ALKPHOSPHAT  87   TBILIRUBIN  0.5   GLUCOSE  67       Recent Labs      06/13/18 1952 06/13/18 2005   RBC  4.60   --    HEMOGLOBIN  14.6   --    HEMATOCRIT  44.9   --    PLATELETCT  349   --    PROTHROMBTM   --   14.2   APTT   --   29.7   INR   --   1.13       Recent Labs      06/13/18 1952   WBC  10.2   NEUTSPOLYS  68.40   LYMPHOCYTES  23.70   MONOCYTES  6.10   EOSINOPHILS  0.30   BASOPHILS  0.90   ASTSGOT  20   ALTSGPT  21   ALKPHOSPHAT  87   TBILIRUBIN  0.5           Assessment/Plan     * Headache and focal deficits, likely secondary to Complicated migraine- (present on admission)   Assessment & Plan    Hx of intractable migraine, with unusual presentation on exam.   Questionable hx of psychogenic seizure, but also with hx of pseudotumor cerebri (s/p shunt) and record of CVA in 2007.   Notes leg weakness with indifference, and vague, changing pain complaints.   Possible ddx including complicated migraine, psychogenic seizure, or psychiatric problem;   however, not excluding potential seizure, infection, or medication reaction.   Last MRI - 5/29/18.  Within normal limits,-reveals ventriculostomy shunt catheter in place with no hydrocephalus and mild white matter change  Last EEG - 5/30/18.  Within normal limits  Patient has IR lumbar puncture scheduled for 6/15/2018  Plan:  Started on ergotamine 1  mg IV and p.o. Dilaudid 8 mg every 8 hours as needed for pain.  Continue home medications of Benadryl and Toradol injections.  Continue keppra, per prior recommendation from neuro.   IR-lumbar puncture, if recommended.  -MR C spine ordered along with psychiatric consultation as per neurology requirements.  Continue ASA-81.        Cellulitis of finger - vs, vasculitis or calciphylaxis   Assessment & Plan    Patient with swelling and redness of left middle finger.  -Patient has a history of recurrent swellings of both hands in the past.  Being seen and followed by rheumatology, could be seronegative rheumatoid arthritis, as previous tests are all negative.  Plan  Continue prednisone 5 mg and Plaquenil as scheduled.  We will change antibiotic from clindamycin to Keflex and and monitor blood cultures for any significant growth.  X-rays, left hand and review.  Clinical examination shows low probability of cellulitis as the classic symptoms of heat increase localized temperature and tenderness are absent.  Review with imaging        Rheumatoid arthritis (HCC)- (present on admission)   Assessment & Plan    continuing on home dose prednisone and plaquenil.

## 2018-06-16 ENCOUNTER — APPOINTMENT (OUTPATIENT)
Dept: RADIOLOGY | Facility: MEDICAL CENTER | Age: 48
DRG: 103 | End: 2018-06-16
Attending: STUDENT IN AN ORGANIZED HEALTH CARE EDUCATION/TRAINING PROGRAM
Payer: MEDICARE

## 2018-06-16 LAB
ANION GAP SERPL CALC-SCNC: 5 MMOL/L (ref 0–11.9)
BUN SERPL-MCNC: 6 MG/DL (ref 8–22)
CALCIUM SERPL-MCNC: 8.1 MG/DL (ref 8.5–10.5)
CHLORIDE SERPL-SCNC: 106 MMOL/L (ref 96–112)
CO2 SERPL-SCNC: 30 MMOL/L (ref 20–33)
CREAT SERPL-MCNC: 0.53 MG/DL (ref 0.5–1.4)
ERYTHROCYTE [DISTWIDTH] IN BLOOD BY AUTOMATED COUNT: 44.6 FL (ref 35.9–50)
GLUCOSE SERPL-MCNC: 122 MG/DL (ref 65–99)
HCT VFR BLD AUTO: 37.9 % (ref 37–47)
HGB BLD-MCNC: 12.5 G/DL (ref 12–16)
IGA SERPL-MCNC: 141 MG/DL (ref 68–408)
MCH RBC QN AUTO: 32.1 PG (ref 27–33)
MCHC RBC AUTO-ENTMCNC: 33 G/DL (ref 33.6–35)
MCV RBC AUTO: 97.2 FL (ref 81.4–97.8)
PLATELET # BLD AUTO: 290 K/UL (ref 164–446)
PMV BLD AUTO: 10 FL (ref 9–12.9)
POTASSIUM SERPL-SCNC: 4 MMOL/L (ref 3.6–5.5)
RBC # BLD AUTO: 3.9 M/UL (ref 4.2–5.4)
SODIUM SERPL-SCNC: 141 MMOL/L (ref 135–145)
TTG IGA SER IA-ACNC: 0 U/ML (ref 0–3)
WBC # BLD AUTO: 13.1 K/UL (ref 4.8–10.8)

## 2018-06-16 PROCEDURE — A9270 NON-COVERED ITEM OR SERVICE: HCPCS | Performed by: STUDENT IN AN ORGANIZED HEALTH CARE EDUCATION/TRAINING PROGRAM

## 2018-06-16 PROCEDURE — 700102 HCHG RX REV CODE 250 W/ 637 OVERRIDE(OP): Performed by: STUDENT IN AN ORGANIZED HEALTH CARE EDUCATION/TRAINING PROGRAM

## 2018-06-16 PROCEDURE — 700102 HCHG RX REV CODE 250 W/ 637 OVERRIDE(OP): Performed by: INTERNAL MEDICINE

## 2018-06-16 PROCEDURE — 80048 BASIC METABOLIC PNL TOTAL CA: CPT

## 2018-06-16 PROCEDURE — 70250 X-RAY EXAM OF SKULL: CPT

## 2018-06-16 PROCEDURE — 700105 HCHG RX REV CODE 258: Performed by: STUDENT IN AN ORGANIZED HEALTH CARE EDUCATION/TRAINING PROGRAM

## 2018-06-16 PROCEDURE — A9270 NON-COVERED ITEM OR SERVICE: HCPCS | Performed by: INTERNAL MEDICINE

## 2018-06-16 PROCEDURE — 770006 HCHG ROOM/CARE - MED/SURG/GYN SEMI*

## 2018-06-16 PROCEDURE — 85027 COMPLETE CBC AUTOMATED: CPT

## 2018-06-16 PROCEDURE — 700111 HCHG RX REV CODE 636 W/ 250 OVERRIDE (IP): Performed by: STUDENT IN AN ORGANIZED HEALTH CARE EDUCATION/TRAINING PROGRAM

## 2018-06-16 PROCEDURE — 99232 SBSQ HOSP IP/OBS MODERATE 35: CPT | Performed by: INTERNAL MEDICINE

## 2018-06-16 RX ADMIN — KETOROLAC TROMETHAMINE 60 MG: 30 INJECTION, SOLUTION INTRAMUSCULAR at 12:46

## 2018-06-16 RX ADMIN — DIPHENHYDRAMINE HYDROCHLORIDE 50 MG: 50 INJECTION, SOLUTION INTRAMUSCULAR; INTRAVENOUS at 00:45

## 2018-06-16 RX ADMIN — RISPERIDONE 1 MG: 0.5 TABLET ORAL at 08:02

## 2018-06-16 RX ADMIN — DIPHENHYDRAMINE HYDROCHLORIDE 50 MG: 50 INJECTION, SOLUTION INTRAMUSCULAR; INTRAVENOUS at 18:10

## 2018-06-16 RX ADMIN — FERROUS SULFATE TAB 325 MG (65 MG ELEMENTAL FE) 325 MG: 325 (65 FE) TAB at 08:02

## 2018-06-16 RX ADMIN — OMEPRAZOLE 20 MG: 20 CAPSULE, DELAYED RELEASE ORAL at 08:02

## 2018-06-16 RX ADMIN — GABAPENTIN 900 MG: 300 CAPSULE ORAL at 20:03

## 2018-06-16 RX ADMIN — HYDROMORPHONE HYDROCHLORIDE 8 MG: 4 TABLET ORAL at 10:00

## 2018-06-16 RX ADMIN — LEVETIRACETAM 1000 MG: 500 TABLET ORAL at 08:02

## 2018-06-16 RX ADMIN — PREDNISONE 5 MG: 5 TABLET ORAL at 08:02

## 2018-06-16 RX ADMIN — SODIUM CHLORIDE: 9 INJECTION, SOLUTION INTRAVENOUS at 08:06

## 2018-06-16 RX ADMIN — RISPERIDONE 2 MG: 0.5 TABLET ORAL at 20:03

## 2018-06-16 RX ADMIN — HYDROMORPHONE HYDROCHLORIDE 8 MG: 4 TABLET ORAL at 16:15

## 2018-06-16 RX ADMIN — ASPIRIN 81 MG: 81 TABLET, CHEWABLE ORAL at 08:02

## 2018-06-16 RX ADMIN — HYDROXYCHLOROQUINE SULFATE 300 MG: 200 TABLET, FILM COATED ORAL at 08:01

## 2018-06-16 RX ADMIN — DIPHENHYDRAMINE HYDROCHLORIDE 50 MG: 50 INJECTION, SOLUTION INTRAMUSCULAR; INTRAVENOUS at 07:16

## 2018-06-16 RX ADMIN — KETOROLAC TROMETHAMINE 60 MG: 30 INJECTION, SOLUTION INTRAMUSCULAR at 18:10

## 2018-06-16 RX ADMIN — HYDROMORPHONE HYDROCHLORIDE 8 MG: 4 TABLET ORAL at 22:13

## 2018-06-16 RX ADMIN — DULOXETINE HYDROCHLORIDE 60 MG: 60 CAPSULE, DELAYED RELEASE ORAL at 08:02

## 2018-06-16 RX ADMIN — LEVETIRACETAM 1000 MG: 500 TABLET ORAL at 20:03

## 2018-06-16 RX ADMIN — HYDROMORPHONE HYDROCHLORIDE 8 MG: 4 TABLET ORAL at 04:53

## 2018-06-16 RX ADMIN — DULOXETINE HYDROCHLORIDE 60 MG: 60 CAPSULE, DELAYED RELEASE ORAL at 20:03

## 2018-06-16 RX ADMIN — ENOXAPARIN SODIUM 40 MG: 100 INJECTION SUBCUTANEOUS at 10:00

## 2018-06-16 RX ADMIN — GABAPENTIN 900 MG: 300 CAPSULE ORAL at 14:48

## 2018-06-16 RX ADMIN — DIPHENHYDRAMINE HYDROCHLORIDE 50 MG: 50 INJECTION, SOLUTION INTRAMUSCULAR; INTRAVENOUS at 12:46

## 2018-06-16 RX ADMIN — CEPHALEXIN 500 MG: 500 CAPSULE ORAL at 06:05

## 2018-06-16 RX ADMIN — CEPHALEXIN 500 MG: 500 CAPSULE ORAL at 18:10

## 2018-06-16 RX ADMIN — KETOROLAC TROMETHAMINE 60 MG: 30 INJECTION, SOLUTION INTRAMUSCULAR at 00:44

## 2018-06-16 RX ADMIN — GABAPENTIN 900 MG: 300 CAPSULE ORAL at 08:02

## 2018-06-16 RX ADMIN — KETOROLAC TROMETHAMINE 60 MG: 30 INJECTION, SOLUTION INTRAMUSCULAR at 07:15

## 2018-06-16 RX ADMIN — CEPHALEXIN 500 MG: 500 CAPSULE ORAL at 11:32

## 2018-06-16 ASSESSMENT — ENCOUNTER SYMPTOMS
NAUSEA: 0
VOMITING: 0
BLURRED VISION: 0
COUGH: 0
CHILLS: 0
WEAKNESS: 1
EYE DISCHARGE: 0
TREMORS: 0
TINGLING: 0
NECK PAIN: 0
HALLUCINATIONS: 0
ORTHOPNEA: 0
SHORTNESS OF BREATH: 0
DOUBLE VISION: 0
ABDOMINAL PAIN: 0
MYALGIAS: 0
SPUTUM PRODUCTION: 0
HEADACHES: 0
SINUS PAIN: 0
NERVOUS/ANXIOUS: 0
STRIDOR: 0
FEVER: 0

## 2018-06-16 ASSESSMENT — PAIN SCALES - GENERAL
PAINLEVEL_OUTOF10: 8
PAINLEVEL_OUTOF10: 7
PAINLEVEL_OUTOF10: 4
PAINLEVEL_OUTOF10: 5
PAINLEVEL_OUTOF10: 8
PAINLEVEL_OUTOF10: 8
PAINLEVEL_OUTOF10: 7

## 2018-06-16 NOTE — PROGRESS NOTES
Internal Medicine Interval Note  Note Author: Freddy Chou M.D.     Name Enedelia Pang     1970   Age/Sex 47 y.o. female   MRN [id number]   Code Status FULL     After 5PM or if no immediate response to page, please call for cross-coverage  Attending/Team:/rianna See Patient List for primary contact information  Call [phone] to page    1st Call - Day Intern (R1):    2nd Call - Day Sr. Resident (R2/R3):   Dr.Tariq Melendrez         Reason for interval visit  (Principal Problem)   Complicated migraine    Interval Problem Daily Status Update  (24 hours)     Patient reported to having improvement of her  migrainous headache, no nausea or vomiting,seen.No bowel or bladder problems .Patient has been ambulating with the help of a walker on the villalobos.  -On follow-up with neurology and had MRI of the brain and EEG, which revealed ventriculostomy shunt catheter in place with no hydrocephalus and mild white matter change.  An EEG study on  is within normal limits  -Patient has seronegative arthritis and is on follow-up with rheumatology as outpatient for workup for vasculitis with ESR CRP MG,  C3/C4, ANCA serologies, and antiphospholipid antibodies negative.  Patient is being followed up by rheumatology and is seen by ..  Patient seen by neurology who recommended MR C of the spine and psychiatric consult, and to hold, lumbar puncture as it is low yield with no clinical signs of nuchal rigidity or significant headache or white blood cell count elevation,and was seen by neurology today,who advised discharge and outpatient follow up,after completion of MR-C spine and psychiatry  recommendations and follow up for pain management  -Patient awaiting MRC-spine, after confirmation, from neurology, about the safety of the V-P Shunt, and if the  shunt can be reprogrammed after MRI .Video EEG normal.  Contacted neurosurgery, for approval of MR-scan of neck, who indicated  risks, associated with the  shunt, if this Is not an emergency.  Patient has been awaiting scan for planning discharge, Pain management doctor will be consulted, to manage pain meds,at her pain management,clinic.  Contacted  of neurosurgery ,who advised that the patient was offered a occipital nerve stimulator for the pain and, then patient had declined, and MR-C-spine was not indicated at this point, due to risks to the  shunt, involved , unless it is an emergency.    Review of Systems   Constitutional: Negative for chills, fever and malaise/fatigue.   HENT: Negative for ear discharge, ear pain and sinus pain.    Eyes: Negative for blurred vision, double vision and discharge.   Respiratory: Negative for cough, sputum production, shortness of breath and stridor.    Cardiovascular: Negative for chest pain, orthopnea and leg swelling.   Gastrointestinal: Negative for abdominal pain, nausea and vomiting.   Musculoskeletal: Negative for myalgias and neck pain.   Skin: Negative for itching and rash.   Neurological: Positive for weakness. Negative for tingling, tremors and headaches.   Psychiatric/Behavioral: Negative for hallucinations. The patient is not nervous/anxious.        Consultants/Specialty  Psychiatry.  Neurology  PCP:     Disposition  inpatient    Quality Measures  Quality-Core Measures   Reviewed items::  Labs reviewed and Medications reviewed  DVT prophylaxis pharmacological::  Enoxaparin (Lovenox)          Physical Exam       Vitals:    06/15/18 2326 06/16/18 0400 06/16/18 0740 06/16/18 1615   BP: 106/60 104/55 103/55 111/83   Pulse: (!) 106 (!) 102 96 (!) 113   Resp: 15 16 16 18   Temp: 36.9 °C (98.5 °F) 36.3 °C (97.3 °F) 36.4 °C (97.5 °F) 37 °C (98.6 °F)   TempSrc:       SpO2: 98% 92% 95% 92%   Weight:       Height:         Body mass index is 31.59 kg/m².    Oxygen Therapy:  Pulse Oximetry: 92 %, O2 (LPM): 0, O2 Delivery: None (Room Air)    Physical Exam   Constitutional: She is oriented to  person, place, and time and well-developed, well-nourished, and in no distress.   HENT:   Head: Normocephalic and atraumatic.   Eyes: EOM are normal. Pupils are equal, round, and reactive to light.   Neck: Normal range of motion. Neck supple.   Cardiovascular: Normal rate, regular rhythm and normal heart sounds.  Exam reveals no friction rub.    No murmur heard.  Pulmonary/Chest: Breath sounds normal. No respiratory distress. She has no wheezes. She has no rales.   Abdominal: Soft. Bowel sounds are normal. She exhibits no distension. There is no tenderness. There is no rebound.   Musculoskeletal: Normal range of motion. She exhibits no edema or deformity.   Neurological: She is alert and oriented to person, place, and time. No cranial nerve deficit. GCS score is 15.   Skin: Skin is dry.   Psychiatric: Mood and affect normal.         Lab Data Review:         6/16/2018  12:32 PM    Recent Labs      06/13/18   1952  06/15/18   0300  06/16/18   0330   SODIUM  140  138  141   POTASSIUM  3.7  4.1  4.0   CHLORIDE  105  103  106   CO2  22  25  30   BUN  5*  8  6*   CREATININE  0.60  0.58  0.53   CALCIUM  9.2  8.3*  8.1*       Recent Labs      06/13/18   1952  06/15/18   0300  06/16/18   0330   ALTSGPT  21  18   --    ASTSGOT  20  13   --    ALKPHOSPHAT  87  66   --    TBILIRUBIN  0.5  0.4   --    GLUCOSE  67  102*  122*       Recent Labs      06/13/18   1952  06/13/18   2005  06/15/18   0300  06/16/18   0330   RBC  4.60   --   4.43  3.90*   HEMOGLOBIN  14.6   --   14.2  12.5   HEMATOCRIT  44.9   --   42.1  37.9   PLATELETCT  349   --   345  290   PROTHROMBTM   --   14.2   --    --    APTT   --   29.7   --    --    INR   --   1.13   --    --    FERRITIN   --    --   31.4   --        Recent Labs      06/13/18   1952  06/15/18   0300  06/16/18   0330   WBC  10.2  12.6*  13.1*   NEUTSPOLYS  68.40   --    --    LYMPHOCYTES  23.70   --    --    MONOCYTES  6.10   --    --    EOSINOPHILS  0.30   --    --    BASOPHILS  0.90   --     --    ASTSGOT  20  13   --    ALTSGPT  21  18   --    ALKPHOSPHAT  87  66   --    TBILIRUBIN  0.5  0.4   --            Assessment/Plan     * Headache and focal deficits, likely secondary to Complicated migraine- (present on admission)   Assessment & Plan    Hx of intractable migraine, with unusual presentation on exam.   Questionable hx of psychogenic seizure, but also with hx of pseudotumor cerebri (s/p shunt) and record of CVA in 2007.   Notes leg weakness with indifference, and vague, changing pain complaints.   Possible ddx including complicated migraine, psychogenic seizure, or psychiatric problem;   however, not excluding potential seizure, infection, or medication reaction.   Last MRI - 5/29/18.  Within normal limits,-reveals ventriculostomy shunt catheter in place with no hydrocephalus and mild white matter change  Last EEG - 5/30/18.  Within normal limits  Plan:  Started on ergotamine 1 mg IV and p.o. Dilaudid 8 mg every 8 hours as needed for pain.  Patient gets good relief of pain on above medications.  Continue home medications of Benadryl and Toradol injections.  Continue keppra, per prior recommendation from neuro.   IR-lumbar puncture, not recommended.  Neurology note seen, can be discharged,for outpatient follow up once, after after MRC spine normal.  Discussed with neurosurgeon, , who advised against MRC spine at this point, as it is not a surgical emergency, and there is a possibility of the  shunt, developing problems, after the MR C-spine.  Psychiatry follow up note seen,have increased the Resperidal and gabapentin to Alleviate the pain and anxiety.   follow-up as outpatient if needed   Pain management specialist of  patient to be consulted .  Continue ASA-81.  Will start weaning patient off pain medication, after consultation with her pain management specialist.  Patient was counseled on the effects of narcotics and opiates and has agreed , to talk to her pain management specialist  about tapering off her narcotics        Cellulitis of finger - vs, vasculitis or calciphylaxis   Assessment & Plan    Patient with swelling and redness of left middle finger.  -Patient has a history of recurrent swellings of both hands in the past.  Being seen and followed by rheumatology, could be seronegative rheumatoid arthritis, as previous tests are all negative.  Plan  Continue prednisone 5 mg and Plaquenil as scheduled.  We will change antibiotic from clindamycin to Keflex and and monitor blood cultures for any significant growth.  X-rays, left hand and review.  Clinical examination shows low probability of cellulitis as the classic symptoms of heat increase localized temperature and tenderness are absent.   Imaging reveals periarticular swelling with demineralisation,with blood cultures revealing no growth.        Rheumatoid arthritis (HCC)- (present on admission)   Assessment & Plan    continuing on home dose prednisone and plaquenil.

## 2018-06-16 NOTE — CARE PLAN
Problem: Communication  Goal: The ability to communicate needs accurately and effectively will improve    Intervention: [place] patient and significant other/support system to call light to alert staff of needs  Able to make needs known, instructed to call for assistance      Problem: Safety  Goal: Will remain free from injury    Intervention: Provide assistance with mobility  SBA with FWW to bathroom       Problem: Pain Management  Goal: Pain level will decrease to patient's comfort goal    Intervention: Follow pain managment plan developed in collaboration with patient and Interdisciplinary Team  Complains of headache, medicated per MAR with good results

## 2018-06-16 NOTE — CARE PLAN
Problem: Safety  Goal: Will remain free from falls  Outcome: PROGRESSING AS EXPECTED  Bed locked and in lowest position. Call light and personal belongings in reach. Assistive device out of sight. Bed alarm in place. Hourly rounding.     Problem: Pain Management  Goal: Pain level will decrease to patient's comfort goal  Outcome: PROGRESSING AS EXPECTED  Pt medicated with PRN pain medications.

## 2018-06-16 NOTE — PROGRESS NOTES
Assumed care of pt at 1900. Pt AAOx4, up with SBA with FFW. Family at bedside. Pt c/o headache, medicated per MAR. CHG bath given by pt . Port to right chest. Seizure precautions in place. Will continue hourly rounding.

## 2018-06-16 NOTE — PROGRESS NOTES
Assumed care of pt. Able to make needs known. A/Ox4. Right chest port with NS at 83cc.hr, Regular diet. SBA with FWW. 2 L O2 via oxymask. Complains of headache, medicated per MAR. POC MRI pending. Bed alarm on. Call light in reach, bed in low position, will continue hourly rounding.

## 2018-06-16 NOTE — PROCEDURES
VIDEO ELECTROENCEPHALOGRAM REPORT        Referring MD: Dr. Hamlin.      DOS: 6/15/2018 (total recording of 26 minutes).      INDICATION:  Enedelia Pang 47 y.o. female presenting with shaking spell, mild confusion, h/o psychogenic spells.       CURRENT ANTIEPILEPTIC REGIMEN: Levetiracetam 1000 mg bid, Gabapentin 900 mg tid.      TECHNIQUE: 30 channel video electroencephalogram (EEG) was performed in accordance with the international 10-20 system. The study was reviewed in bipolar and referential montages. The recording examined the patient during wakeful and drowsy/sleep state(s).      DESCRIPTION OF THE RECORD:  During the wakefulness, the background showed a symmetrical 8 Hz alpha activity posteriorly with amplitude of 70 mV.  There was reactivity to eye closure/opening.  A normal anterior-posterior gradient was noted with faster beta frequencies seen anteriorly.  During drowsiness, theta/delta frequencies were seen.     During the sleep state, background shows diffuse high-amplitude 4-5 Hz delta activity.  Symmetrical high-amplitude sleep spindles and vertex sharps were seen in the leads over the central regions.      ACTIVATION PROCEDURES:   Intermittent Photic stimulation was performed in a stepwise fashion from 1 to 30 Hz and elicited a normal response (photic driving), most noticeable in the posterior leads.        ICTAL AND/OR INTERICTAL FINDINGS:   No focal or generalized epileptiform activity noted. No regional slowing was seen during this routine study.  No clinical events or seizures were reported or recorded during the study.      EKG: sampling of the EKG recording demonstrated sinus rhythm.     EVENTS:  None.      INTERPRETATION:  This is a normal video EEG recording in the awake, drowsy, and sleep state(s).  Clinical correlation is recommended.     Note: A normal EEG does not rule out epilepsy.  If the clinical suspicion remains high for seizures, a prolonged recording to capture clinical or  subclinical events may be helpful.           Ed Linares MD   Epilepsy and Neurodiagnostics.   Clinical  of Neurology [facility].   Diplomate in Neurology, Epilepsy, and Electrodiagnostic Medicine.   Office: [phone]  Fax: [phone]       AJAY KNIGHT    DD:  06/15/2018 17:04:53  DT:  06/15/2018 17:20:56    D#:  [id number]  Job#:  [id number]

## 2018-06-16 NOTE — EEG PROGRESS NOTE
VIDEO ELECTROENCEPHALOGRAM REPORT      Referring MD: Dr. Hamlin.     DOS: 6/15/2018 (total recording of 26 minutes).     INDICATION:  Enedelia Pang 47 y.o. female presenting with shaking spell, mild confusion, h/o psychogenic spells.      CURRENT ANTIEPILEPTIC REGIMEN: Levetiracetam 1000 mg bid, Gabapentin 900 mg tid.     TECHNIQUE: 30 channel video electroencephalogram (EEG) was performed in accordance with the international 10-20 system. The study was reviewed in bipolar and referential montages. The recording examined the patient during wakeful and drowsy/sleep state(s).     DESCRIPTION OF THE RECORD:  During the wakefulness, the background showed a symmetrical 8 Hz alpha activity posteriorly with amplitude of 70 mV.  There was reactivity to eye closure/opening.  A normal anterior-posterior gradient was noted with faster beta frequencies seen anteriorly.  During drowsiness, theta/delta frequencies were seen.    During the sleep state, background shows diffuse high-amplitude 4-5 Hz delta activity.  Symmetrical high-amplitude sleep spindles and vertex sharps were seen in the leads over the central regions.     ACTIVATION PROCEDURES:   Intermittent Photic stimulation was performed in a stepwise fashion from 1 to 30 Hz and elicited a normal response (photic driving), most noticeable in the posterior leads.      ICTAL AND/OR INTERICTAL FINDINGS:   No focal or generalized epileptiform activity noted. No regional slowing was seen during this routine study.  No clinical events or seizures were reported or recorded during the study.     EKG: sampling of the EKG recording demonstrated sinus rhythm.    EVENTS:  None.     INTERPRETATION:  This is a normal video EEG recording in the awake, drowsy, and sleep state(s).  Clinical correlation is recommended.    Note: A normal EEG does not rule out epilepsy.  If the clinical suspicion remains high for seizures, a prolonged recording to capture clinical or subclinical events may  be helpful.        Ed Linares MD   Epilepsy and Neurodiagnostics.   Clinical  of Neurology [facility].   Diplomate in Neurology, Epilepsy, and Electrodiagnostic Medicine.   Office: [phone]  Fax: [phone]

## 2018-06-17 LAB
H CAPSUL MYC AB TITR SER CF: NORMAL {TITER}
HISTO AB YEAST 9338: NORMAL

## 2018-06-17 PROCEDURE — 700102 HCHG RX REV CODE 250 W/ 637 OVERRIDE(OP): Performed by: STUDENT IN AN ORGANIZED HEALTH CARE EDUCATION/TRAINING PROGRAM

## 2018-06-17 PROCEDURE — 700111 HCHG RX REV CODE 636 W/ 250 OVERRIDE (IP): Performed by: STUDENT IN AN ORGANIZED HEALTH CARE EDUCATION/TRAINING PROGRAM

## 2018-06-17 PROCEDURE — 770006 HCHG ROOM/CARE - MED/SURG/GYN SEMI*

## 2018-06-17 PROCEDURE — 700102 HCHG RX REV CODE 250 W/ 637 OVERRIDE(OP): Performed by: INTERNAL MEDICINE

## 2018-06-17 PROCEDURE — A9270 NON-COVERED ITEM OR SERVICE: HCPCS | Performed by: STUDENT IN AN ORGANIZED HEALTH CARE EDUCATION/TRAINING PROGRAM

## 2018-06-17 PROCEDURE — 700105 HCHG RX REV CODE 258: Performed by: STUDENT IN AN ORGANIZED HEALTH CARE EDUCATION/TRAINING PROGRAM

## 2018-06-17 PROCEDURE — A9270 NON-COVERED ITEM OR SERVICE: HCPCS | Performed by: INTERNAL MEDICINE

## 2018-06-17 PROCEDURE — 99232 SBSQ HOSP IP/OBS MODERATE 35: CPT | Performed by: INTERNAL MEDICINE

## 2018-06-17 RX ORDER — SODIUM CHLORIDE 9 MG/ML
1000 INJECTION, SOLUTION INTRAVENOUS ONCE
Status: DISCONTINUED | OUTPATIENT
Start: 2018-06-17 | End: 2018-06-17

## 2018-06-17 RX ORDER — SODIUM CHLORIDE 9 MG/ML
INJECTION, SOLUTION INTRAVENOUS CONTINUOUS
Status: DISCONTINUED | OUTPATIENT
Start: 2018-06-17 | End: 2018-06-19

## 2018-06-17 RX ADMIN — HYDROMORPHONE HYDROCHLORIDE 8 MG: 4 TABLET ORAL at 10:35

## 2018-06-17 RX ADMIN — OMEPRAZOLE 20 MG: 20 CAPSULE, DELAYED RELEASE ORAL at 09:02

## 2018-06-17 RX ADMIN — FERROUS SULFATE TAB 325 MG (65 MG ELEMENTAL FE) 325 MG: 325 (65 FE) TAB at 09:00

## 2018-06-17 RX ADMIN — CEPHALEXIN 500 MG: 500 CAPSULE ORAL at 17:02

## 2018-06-17 RX ADMIN — DIPHENHYDRAMINE HYDROCHLORIDE 50 MG: 50 INJECTION, SOLUTION INTRAMUSCULAR; INTRAVENOUS at 06:11

## 2018-06-17 RX ADMIN — CEPHALEXIN 500 MG: 500 CAPSULE ORAL at 00:15

## 2018-06-17 RX ADMIN — CEPHALEXIN 500 MG: 500 CAPSULE ORAL at 06:11

## 2018-06-17 RX ADMIN — DIPHENHYDRAMINE HYDROCHLORIDE 50 MG: 50 INJECTION, SOLUTION INTRAMUSCULAR; INTRAVENOUS at 00:15

## 2018-06-17 RX ADMIN — RISPERIDONE 2 MG: 0.5 TABLET ORAL at 20:52

## 2018-06-17 RX ADMIN — DULOXETINE HYDROCHLORIDE 60 MG: 60 CAPSULE, DELAYED RELEASE ORAL at 09:01

## 2018-06-17 RX ADMIN — KETOROLAC TROMETHAMINE 60 MG: 30 INJECTION, SOLUTION INTRAMUSCULAR at 18:36

## 2018-06-17 RX ADMIN — GABAPENTIN 900 MG: 300 CAPSULE ORAL at 09:02

## 2018-06-17 RX ADMIN — DIPHENHYDRAMINE HYDROCHLORIDE 50 MG: 50 INJECTION, SOLUTION INTRAMUSCULAR; INTRAVENOUS at 12:26

## 2018-06-17 RX ADMIN — CEPHALEXIN 500 MG: 500 CAPSULE ORAL at 23:09

## 2018-06-17 RX ADMIN — HYDROXYCHLOROQUINE SULFATE 300 MG: 200 TABLET, FILM COATED ORAL at 08:58

## 2018-06-17 RX ADMIN — DULOXETINE HYDROCHLORIDE 60 MG: 60 CAPSULE, DELAYED RELEASE ORAL at 20:52

## 2018-06-17 RX ADMIN — RISPERIDONE 1 MG: 0.5 TABLET ORAL at 09:01

## 2018-06-17 RX ADMIN — HYDROMORPHONE HYDROCHLORIDE 8 MG: 4 TABLET ORAL at 17:02

## 2018-06-17 RX ADMIN — GABAPENTIN 900 MG: 300 CAPSULE ORAL at 14:24

## 2018-06-17 RX ADMIN — ASPIRIN 81 MG: 81 TABLET, CHEWABLE ORAL at 09:00

## 2018-06-17 RX ADMIN — HYDROMORPHONE HYDROCHLORIDE 8 MG: 4 TABLET ORAL at 23:07

## 2018-06-17 RX ADMIN — CEPHALEXIN 500 MG: 500 CAPSULE ORAL at 12:26

## 2018-06-17 RX ADMIN — DIPHENHYDRAMINE HYDROCHLORIDE 50 MG: 50 INJECTION, SOLUTION INTRAMUSCULAR; INTRAVENOUS at 18:35

## 2018-06-17 RX ADMIN — KETOROLAC TROMETHAMINE 60 MG: 30 INJECTION, SOLUTION INTRAMUSCULAR at 06:11

## 2018-06-17 RX ADMIN — ENOXAPARIN SODIUM 40 MG: 100 INJECTION SUBCUTANEOUS at 09:03

## 2018-06-17 RX ADMIN — KETOROLAC TROMETHAMINE 60 MG: 30 INJECTION, SOLUTION INTRAMUSCULAR at 00:15

## 2018-06-17 RX ADMIN — SODIUM CHLORIDE: 9 INJECTION, SOLUTION INTRAVENOUS at 17:09

## 2018-06-17 RX ADMIN — KETOROLAC TROMETHAMINE 60 MG: 30 INJECTION, SOLUTION INTRAMUSCULAR at 12:25

## 2018-06-17 RX ADMIN — GABAPENTIN 900 MG: 300 CAPSULE ORAL at 20:52

## 2018-06-17 RX ADMIN — LEVETIRACETAM 1000 MG: 500 TABLET ORAL at 09:01

## 2018-06-17 RX ADMIN — LEVETIRACETAM 1000 MG: 500 TABLET ORAL at 20:51

## 2018-06-17 RX ADMIN — SODIUM CHLORIDE: 9 INJECTION, SOLUTION INTRAVENOUS at 10:35

## 2018-06-17 RX ADMIN — PREDNISONE 5 MG: 5 TABLET ORAL at 09:03

## 2018-06-17 RX ADMIN — HYDROMORPHONE HYDROCHLORIDE 8 MG: 4 TABLET ORAL at 04:29

## 2018-06-17 RX ADMIN — SODIUM CHLORIDE: 9 INJECTION, SOLUTION INTRAVENOUS at 22:41

## 2018-06-17 ASSESSMENT — PAIN SCALES - GENERAL
PAINLEVEL_OUTOF10: 4
PAINLEVEL_OUTOF10: 8
PAINLEVEL_OUTOF10: 7
PAINLEVEL_OUTOF10: 6
PAINLEVEL_OUTOF10: 8

## 2018-06-17 ASSESSMENT — ENCOUNTER SYMPTOMS
HEADACHES: 1
SENSORY CHANGE: 0
DOUBLE VISION: 0
NERVOUS/ANXIOUS: 0
SEIZURES: 0
CHILLS: 0
WEIGHT LOSS: 0
HEMOPTYSIS: 0
TREMORS: 0
HALLUCINATIONS: 0
FOCAL WEAKNESS: 0
BACK PAIN: 0
ABDOMINAL PAIN: 0
WEAKNESS: 0
BLURRED VISION: 0
COUGH: 0
SPUTUM PRODUCTION: 0
FEVER: 0
PALPITATIONS: 0
NECK PAIN: 0
VOMITING: 0
SPEECH CHANGE: 0
NAUSEA: 0
SINUS PAIN: 0

## 2018-06-17 ASSESSMENT — PATIENT HEALTH QUESTIONNAIRE - PHQ9
3. TROUBLE FALLING OR STAYING ASLEEP OR SLEEPING TOO MUCH: NOT AT ALL
1. LITTLE INTEREST OR PLEASURE IN DOING THINGS: NEARLY EVERY DAY
4. FEELING TIRED OR HAVING LITTLE ENERGY: NEARLY EVERY DAY
9. THOUGHTS THAT YOU WOULD BE BETTER OFF DEAD, OR OF HURTING YOURSELF: SEVERAL DAYS
2. FEELING DOWN, DEPRESSED, IRRITABLE, OR HOPELESS: NEARLY EVERY DAY
7. TROUBLE CONCENTRATING ON THINGS, SUCH AS READING THE NEWSPAPER OR WATCHING TELEVISION: SEVERAL DAYS
SUM OF ALL RESPONSES TO PHQ QUESTIONS 1-9: 16
SUM OF ALL RESPONSES TO PHQ9 QUESTIONS 1 AND 2: 6
6. FEELING BAD ABOUT YOURSELF - OR THAT YOU ARE A FAILURE OR HAVE LET YOURSELF OR YOUR FAMILY DOWN: SEVERAL DAYS
5. POOR APPETITE OR OVEREATING: NEARLY EVERY DAY
8. MOVING OR SPEAKING SO SLOWLY THAT OTHER PEOPLE COULD HAVE NOTICED. OR THE OPPOSITE, BEING SO FIGETY OR RESTLESS THAT YOU HAVE BEEN MOVING AROUND A LOT MORE THAN USUAL: SEVERAL DAYS

## 2018-06-17 NOTE — PROGRESS NOTES
Internal Medicine Interval Note  Note Author: Freddy Chou M.D.     Name Enedelia Pang     1970   Age/Sex 47 y.o. female   MRN [id number]   Code Status full     After 5PM or if no immediate response to page, please call for cross-coverage  Attending/Team: /rianna See Patient List for primary contact information  Call [phone] to page    1st Call - Day Intern (R1):    2nd Call - Day Sr. Resident (R2/R3):   .         Reason for interval visit  (Principal Problem)   Complicated migraine    Interval Problem Daily Status Update  (24 hours)   Patient on chronic high-dose opiates, for her headaches, and has to be tapered, gradually, in association with her pain management specialist and neurology.  Patient might need rehab placement for intractable pain narcotic tolerance .  Patient currently on Dilaudid and oxycodone as an outpatient, with tolerability of opiates becoming an issue.  Patient reported to having improvement of her  migrainous headache, no nausea or vomiting,seen.No bowel or bladder problems .Patient has been ambulating with the help of a walker on the villalobos.  -On follow-up with neurology and had MRI of the brain and EEG, which revealed ventriculostomy shunt catheter in place with no hydrocephalus and mild white matter change.  An EEG study on  is within normal limits  -Patient has seronegative arthritis and is on follow-up with rheumatology as outpatient for workup for vasculitis with ESR CRP MG,  C3/C4, ANCA serologies, and antiphospholipid antibodies negative.  Patient is being followed up by rheumatology and is seen by ..  Patient seen by neurology who recommended MR C of the spine and psychiatric consult, and to hold, lumbar puncture as it is low yield with no clinical signs of nuchal rigidity or significant headache or white blood cell count elevation,and was seen by neurology today,who advised discharge and outpatient follow  up,after completion of MR-C spine and psychiatry  recommendations and follow up for pain management  -Patient awaiting MRC-spine, after confirmation, from neurology, about the safety of the V-P Shunt, and if the  shunt can be reprogrammed after MRI .Video EEG normal.  Contacted neurosurgery, for approval of MR-scan of neck, who indicated risks, associated with the  shunt, if this Is not an emergency.  Patient has been awaiting scan for planning discharge, Pain management doctor will be consulted, to manage pain meds,at her pain management,clinic.  Contacted  of neurosurgery ,who advised that the patient was offered a occipital nerve stimulator for the pain and, then patient had declined, and MR-C-spine was not indicated at this point, due to risks to the  shunt, involved , unless it is an emergency.     Review of Systems   Constitutional: Negative for chills, fever and malaise/fatigue.     Review of Systems   Constitutional: Negative for chills, fever, malaise/fatigue and weight loss.   HENT: Negative for congestion, nosebleeds and sinus pain.    Eyes: Negative for blurred vision and double vision.   Respiratory: Negative for cough, hemoptysis and sputum production.    Cardiovascular: Negative for chest pain and palpitations.   Gastrointestinal: Negative for abdominal pain, nausea and vomiting.   Genitourinary: Negative for dysuria and urgency.   Musculoskeletal: Negative for back pain, joint pain and neck pain.   Skin: Negative for itching and rash.   Neurological: Positive for headaches. Negative for tremors, sensory change, speech change, focal weakness, seizures and weakness.   Psychiatric/Behavioral: Negative for hallucinations. The patient is not nervous/anxious.        Consultants/Specialty  Neurology .  Psychiatry.  PCP: @PCP    Disposition  inpatient    Quality Measures  Quality-Core Measures   Reviewed items::  Labs reviewed and Medications reviewed  DVT prophylaxis pharmacological::   Enoxaparin (Lovenox)          Physical Exam       Vitals:    06/16/18 2100 06/17/18 0400 06/17/18 0509 06/17/18 0800   BP: 116/65 (!) 86/47 (!) 96/70 (!) 97/57   Pulse: (!) 113 (!) 102  (!) 103   Resp: 18 18 18   Temp: 37.1 °C (98.7 °F) 36.7 °C (98.1 °F)  36.6 °C (97.8 °F)   TempSrc:       SpO2: 97% 92%  94%   Weight:  86.7 kg (191 lb 2.2 oz)     Height:         Body mass index is 33.86 kg/m². Weight: 86.7 kg (191 lb 2.2 oz)  Oxygen Therapy:  Pulse Oximetry: 94 %, O2 (LPM): 0, O2 Delivery: None (Room Air)    Physical Exam   Constitutional: She is oriented to person, place, and time and well-developed, well-nourished, and in no distress.   HENT:   Head: Normocephalic and atraumatic.   Eyes: EOM are normal. Pupils are equal, round, and reactive to light.   Neck: Normal range of motion. Neck supple.   Cardiovascular: Normal rate, regular rhythm and normal heart sounds.    Pulmonary/Chest: Effort normal and breath sounds normal.   Abdominal: Soft. Bowel sounds are normal. She exhibits no distension. There is no tenderness. There is no rebound and no guarding.   Musculoskeletal: Normal range of motion. She exhibits no edema or deformity.   Neurological: She is alert and oriented to person, place, and time.   Skin: Skin is warm and dry.   Psychiatric: Mood and affect normal.         Lab Data Review:         6/17/2018  2:41 PM    Recent Labs      06/15/18   0300  06/16/18   0330   SODIUM  138  141   POTASSIUM  4.1  4.0   CHLORIDE  103  106   CO2  25  30   BUN  8  6*   CREATININE  0.58  0.53   CALCIUM  8.3*  8.1*       Recent Labs      06/15/18   0300  06/16/18   0330   ALTSGPT  18   --    ASTSGOT  13   --    ALKPHOSPHAT  66   --    TBILIRUBIN  0.4   --    GLUCOSE  102*  122*       Recent Labs      06/15/18   0300  06/16/18   0330   RBC  4.43  3.90*   HEMOGLOBIN  14.2  12.5   HEMATOCRIT  42.1  37.9   PLATELETCT  345  290   FERRITIN  31.4   --        Recent Labs      06/15/18   0300  06/16/18   0330   WBC  12.6*  13.1*    ASTSGOT  13   --    ALTSGPT  18   --    ALKPHOSPHAT  66   --    TBILIRUBIN  0.4   --            Assessment/Plan     * Headache and focal deficits, likely secondary to Complicated migraine- (present on admission)   Assessment & Plan    Hx of intractable migraine, with unusual presentation on exam.   Questionable hx of psychogenic seizure, but also with hx of pseudotumor cerebri (s/p shunt) and record of CVA in 2007.   Notes leg weakness with indifference, and vague, changing pain complaints.   Possible ddx including complicated migraine, psychogenic seizure, or psychiatric problem;   however, not excluding potential seizure, infection, or medication reaction.   Last MRI - 5/29/18.  Within normal limits,-reveals ventriculostomy shunt catheter in place with no hydrocephalus and mild white matter change  Last EEG - 5/30/18.  Within normal limits  Plan:  Started on ergotamine 1 mg IV and p.o. Dilaudid 8 mg every 8 hours as needed for pain.  Patient gets good relief of pain on above medications.  Continue home medications of Benadryl and Toradol injections.  Continue keppra, per prior recommendation from neuro.   IR-lumbar puncture, not recommended.  Neurology note seen, can be discharged,for outpatient follow up once, after after MRC spine normal.  Discussed with neurosurgeon, , who advised against MRC spine at this point, as it is not a surgical emergency, and there is a possibility of the  shunt, developing problems, after the MR C-spine.  Psychiatry follow up note seen,have increased the Resperidal and gabapentin to Alleviate the pain and anxiety.   follow-up as outpatient if needed   Pain management specialist of  patient to be consulted .  Continue ASA-81.  Will start weaning patient off pain medication, after consultation with her pain management specialist.  Patient was counseled on the effects of narcotics and opiates and has agreed , to talk to her pain management specialist about tapering off her  narcotics, discharge plans and follow-up as appropriate as the patient is currently on Dilaudid and oxycodone which he uses as an outpatient.  We will start Depakote 500 mg twice daily for headaches.        Cellulitis of finger - vs, vasculitis or calciphylaxis   Assessment & Plan    Patient with swelling and redness of left middle finger.  -Patient has a history of recurrent swellings of both hands in the past.  Being seen and followed by rheumatology, could be seronegative rheumatoid arthritis, as previous tests are all negative.  Plan  Continue prednisone 5 mg and Plaquenil as scheduled.  We will change antibiotic from clindamycin to Keflex and and monitor blood cultures for any significant growth.  X-rays, left hand and review.  Clinical examination shows low probability of cellulitis as the classic symptoms of heat increase localized temperature and tenderness are absent.   Imaging reveals periarticular swelling with demineralisation,with blood cultures revealing no growth.        Rheumatoid arthritis (HCC)- (present on admission)   Assessment & Plan    continuing on home dose prednisone and plaquenil.

## 2018-06-17 NOTE — PROGRESS NOTES
Received report from day shift RN and assumed care.  Patient is A+Ox4, pleasant and cooperative.   is at the bedside and is very involved in care.  She is a SBA with FWW out of bed.  She is on RA but sometimes 2L oxymask for comfort.  She has a right chest port with IV fluids, drsg changed.  She is on a regular diet and denies any nausea.  She states her head pain is 8/10.  She is currently on a well scheduled medication routine.  She is due to have an outpatient MRI d/t  shunt.  No other concerns at this time.  Bed is locked and in lowest position, bed alarm on.  Treaded socks off, call light and belongings within reach.  Hourly rounding in place.

## 2018-06-17 NOTE — CARE PLAN
Problem: Infection  Goal: Will remain free from infection  Outcome: PROGRESSING AS EXPECTED  Patient taking PO keflex as scheduled    Problem: Pain Management  Goal: Pain level will decrease to patient's comfort goal  Outcome: PROGRESSING AS EXPECTED  Pain being managed with IV benadryl, PO dilaudid and IM toradol ATC

## 2018-06-18 LAB
ANION GAP SERPL CALC-SCNC: 6 MMOL/L (ref 0–11.9)
BACTERIA BLD CULT: NORMAL
BACTERIA BLD CULT: NORMAL
BUN SERPL-MCNC: 4 MG/DL (ref 8–22)
CALCIUM SERPL-MCNC: 8.5 MG/DL (ref 8.5–10.5)
CHLORIDE SERPL-SCNC: 106 MMOL/L (ref 96–112)
CO2 SERPL-SCNC: 29 MMOL/L (ref 20–33)
CREAT SERPL-MCNC: 0.52 MG/DL (ref 0.5–1.4)
GLUCOSE SERPL-MCNC: 103 MG/DL (ref 65–99)
POTASSIUM SERPL-SCNC: 3.9 MMOL/L (ref 3.6–5.5)
SIGNIFICANT IND 70042: NORMAL
SIGNIFICANT IND 70042: NORMAL
SITE SITE: NORMAL
SITE SITE: NORMAL
SODIUM SERPL-SCNC: 141 MMOL/L (ref 135–145)
SOURCE SOURCE: NORMAL
SOURCE SOURCE: NORMAL

## 2018-06-18 PROCEDURE — 700105 HCHG RX REV CODE 258: Performed by: STUDENT IN AN ORGANIZED HEALTH CARE EDUCATION/TRAINING PROGRAM

## 2018-06-18 PROCEDURE — 97535 SELF CARE MNGMENT TRAINING: CPT

## 2018-06-18 PROCEDURE — 700105 HCHG RX REV CODE 258: Performed by: INTERNAL MEDICINE

## 2018-06-18 PROCEDURE — 700102 HCHG RX REV CODE 250 W/ 637 OVERRIDE(OP): Performed by: STUDENT IN AN ORGANIZED HEALTH CARE EDUCATION/TRAINING PROGRAM

## 2018-06-18 PROCEDURE — 80048 BASIC METABOLIC PNL TOTAL CA: CPT

## 2018-06-18 PROCEDURE — A9270 NON-COVERED ITEM OR SERVICE: HCPCS | Performed by: STUDENT IN AN ORGANIZED HEALTH CARE EDUCATION/TRAINING PROGRAM

## 2018-06-18 PROCEDURE — 99233 SBSQ HOSP IP/OBS HIGH 50: CPT | Performed by: INTERNAL MEDICINE

## 2018-06-18 PROCEDURE — 97116 GAIT TRAINING THERAPY: CPT

## 2018-06-18 PROCEDURE — 700102 HCHG RX REV CODE 250 W/ 637 OVERRIDE(OP): Performed by: INTERNAL MEDICINE

## 2018-06-18 PROCEDURE — 700111 HCHG RX REV CODE 636 W/ 250 OVERRIDE (IP): Performed by: STUDENT IN AN ORGANIZED HEALTH CARE EDUCATION/TRAINING PROGRAM

## 2018-06-18 PROCEDURE — A9270 NON-COVERED ITEM OR SERVICE: HCPCS | Performed by: INTERNAL MEDICINE

## 2018-06-18 PROCEDURE — 770006 HCHG ROOM/CARE - MED/SURG/GYN SEMI*

## 2018-06-18 PROCEDURE — 700101 HCHG RX REV CODE 250: Performed by: INTERNAL MEDICINE

## 2018-06-18 RX ORDER — DIVALPROEX SODIUM 500 MG/1
500 TABLET, DELAYED RELEASE ORAL EVERY 8 HOURS
Status: DISCONTINUED | OUTPATIENT
Start: 2018-06-18 | End: 2018-06-19 | Stop reason: HOSPADM

## 2018-06-18 RX ADMIN — RISPERIDONE 2 MG: 0.5 TABLET ORAL at 21:31

## 2018-06-18 RX ADMIN — DIPHENHYDRAMINE HYDROCHLORIDE 50 MG: 50 INJECTION, SOLUTION INTRAMUSCULAR; INTRAVENOUS at 06:45

## 2018-06-18 RX ADMIN — SODIUM CHLORIDE: 9 INJECTION, SOLUTION INTRAVENOUS at 16:35

## 2018-06-18 RX ADMIN — CEPHALEXIN 500 MG: 500 CAPSULE ORAL at 12:13

## 2018-06-18 RX ADMIN — KETAMINE HYDROCHLORIDE 5 MCG/KG/MIN: 50 INJECTION, SOLUTION, CONCENTRATE INTRAMUSCULAR; INTRAVENOUS at 13:06

## 2018-06-18 RX ADMIN — KETAMINE HYDROCHLORIDE 25 MG: 10 INJECTION, SOLUTION INTRAMUSCULAR; INTRAVENOUS at 12:01

## 2018-06-18 RX ADMIN — DIPHENHYDRAMINE HYDROCHLORIDE 50 MG: 50 INJECTION, SOLUTION INTRAMUSCULAR; INTRAVENOUS at 00:41

## 2018-06-18 RX ADMIN — ASPIRIN 81 MG: 81 TABLET, CHEWABLE ORAL at 09:01

## 2018-06-18 RX ADMIN — DIVALPROEX SODIUM 500 MG: 500 TABLET, DELAYED RELEASE ORAL at 21:31

## 2018-06-18 RX ADMIN — LEVETIRACETAM 1000 MG: 500 TABLET ORAL at 09:02

## 2018-06-18 RX ADMIN — HYDROMORPHONE HYDROCHLORIDE 8 MG: 4 TABLET ORAL at 05:39

## 2018-06-18 RX ADMIN — DULOXETINE HYDROCHLORIDE 60 MG: 60 CAPSULE, DELAYED RELEASE ORAL at 21:30

## 2018-06-18 RX ADMIN — LEVETIRACETAM 1000 MG: 500 TABLET ORAL at 21:31

## 2018-06-18 RX ADMIN — FERROUS SULFATE TAB 325 MG (65 MG ELEMENTAL FE) 325 MG: 325 (65 FE) TAB at 09:01

## 2018-06-18 RX ADMIN — OMEPRAZOLE 20 MG: 20 CAPSULE, DELAYED RELEASE ORAL at 09:02

## 2018-06-18 RX ADMIN — PREDNISONE 5 MG: 5 TABLET ORAL at 09:01

## 2018-06-18 RX ADMIN — ENOXAPARIN SODIUM 40 MG: 100 INJECTION SUBCUTANEOUS at 09:02

## 2018-06-18 RX ADMIN — HYDROXYCHLOROQUINE SULFATE 300 MG: 200 TABLET, FILM COATED ORAL at 09:17

## 2018-06-18 RX ADMIN — GABAPENTIN 900 MG: 300 CAPSULE ORAL at 21:30

## 2018-06-18 RX ADMIN — DULOXETINE HYDROCHLORIDE 60 MG: 60 CAPSULE, DELAYED RELEASE ORAL at 09:01

## 2018-06-18 RX ADMIN — CEPHALEXIN 500 MG: 500 CAPSULE ORAL at 05:39

## 2018-06-18 RX ADMIN — SODIUM CHLORIDE: 9 INJECTION, SOLUTION INTRAVENOUS at 06:49

## 2018-06-18 RX ADMIN — KETOROLAC TROMETHAMINE 60 MG: 30 INJECTION, SOLUTION INTRAMUSCULAR at 06:42

## 2018-06-18 RX ADMIN — KETOROLAC TROMETHAMINE 60 MG: 30 INJECTION, SOLUTION INTRAMUSCULAR at 00:41

## 2018-06-18 RX ADMIN — GABAPENTIN 900 MG: 300 CAPSULE ORAL at 15:57

## 2018-06-18 RX ADMIN — CEPHALEXIN 500 MG: 500 CAPSULE ORAL at 17:27

## 2018-06-18 RX ADMIN — GABAPENTIN 900 MG: 300 CAPSULE ORAL at 09:01

## 2018-06-18 RX ADMIN — RISPERIDONE 1 MG: 0.5 TABLET ORAL at 09:02

## 2018-06-18 RX ADMIN — DIVALPROEX SODIUM 500 MG: 500 TABLET, DELAYED RELEASE ORAL at 16:36

## 2018-06-18 ASSESSMENT — ENCOUNTER SYMPTOMS
DIARRHEA: 0
CONSTIPATION: 0
MYALGIAS: 0
NECK PAIN: 0
PHOTOPHOBIA: 0
SPEECH CHANGE: 0
SHORTNESS OF BREATH: 0
MEMORY LOSS: 0
VOMITING: 0
SEIZURES: 0
NERVOUS/ANXIOUS: 0
PALPITATIONS: 0
WEIGHT LOSS: 0
STRIDOR: 0
TREMORS: 0
DOUBLE VISION: 0
BLURRED VISION: 0
DEPRESSION: 0
BACK PAIN: 0
CHILLS: 0
FEVER: 0
TINGLING: 0
SINUS PAIN: 0
DIZZINESS: 0
HEADACHES: 1
COUGH: 0
HEMOPTYSIS: 0
NAUSEA: 0
SENSORY CHANGE: 0

## 2018-06-18 ASSESSMENT — COGNITIVE AND FUNCTIONAL STATUS - GENERAL
HELP NEEDED FOR BATHING: A LITTLE
PERSONAL GROOMING: A LITTLE
MOBILITY SCORE: 22
DRESSING REGULAR UPPER BODY CLOTHING: A LITTLE
DRESSING REGULAR LOWER BODY CLOTHING: A LITTLE
WALKING IN HOSPITAL ROOM: A LITTLE
DAILY ACTIVITIY SCORE: 18
SUGGESTED CMS G CODE MODIFIER DAILY ACTIVITY: CK
SUGGESTED CMS G CODE MODIFIER MOBILITY: CJ
CLIMB 3 TO 5 STEPS WITH RAILING: A LITTLE
TOILETING: A LITTLE
EATING MEALS: A LITTLE

## 2018-06-18 ASSESSMENT — PAIN SCALES - GENERAL
PAINLEVEL_OUTOF10: 7
PAINLEVEL_OUTOF10: 7
PAINLEVEL_OUTOF10: 8
PAINLEVEL_OUTOF10: 6
PAINLEVEL_OUTOF10: 4

## 2018-06-18 ASSESSMENT — GAIT ASSESSMENTS
DISTANCE (FEET): 300
DEVIATION: ANTALGIC;BRADYKINETIC;DECREASED HEEL STRIKE;DECREASED TOE OFF
ASSISTIVE DEVICE: FRONT WHEEL WALKER
GAIT LEVEL OF ASSIST: STAND BY ASSIST

## 2018-06-18 NOTE — PROGRESS NOTES
Internal Medicine Interval Note  Note Author: Freddy Chou M.D.     Name Enedelia Pang     1970   Age/Sex 47 y.o. female   MRN [id number]   Code Status full     After 5PM or if no immediate response to page, please call for cross-coverage  Attending/Team: Joanna See Patient List for primary contact information  Call [phone] to page    1st Call - Day Intern (R1):    2nd Call - Day Sr. Resident (R2/R3):   Dr.Charles Jordan     Reason for interval visit  (Principal Problem)   Complicated migraine    Interval Problem Daily Status Update  (24 hours)   Patient being seen by physical therapy/Occupational Therapy who advised therapy 3 times per week.  Pain management team at [facility] , and were briefed about the low-dose ketamine protocol and the need to taper down the opiates.  Message conveyed to Ms. Uma Reece ,N/P under Dr. Mahoney, of neurosurgery.  Patient had Depakote added for nonnarcotic headache control and the need to wean narcotics to at least a baseline of 30 MSE recommended  Patient reported to having improvement of her  migrainous headache, no nausea or vomiting,seen.No bowel or bladder problems .Patient has been ambulating with the help of a walker on the villalobos.  -On follow-up with neurology and had MRI of the brain and EEG, which revealed ventriculostomy shunt catheter in place with no hydrocephalus and mild white matter change.  An EEG study on  is within normal limits  -Patient has seronegative arthritis and is on follow-up with rheumatology as outpatient for workup for vasculitis with ESR CRP MG,  C3/C4, ANCA serologies, and antiphospholipid antibodies negative.  Patient is being followed up by rheumatology and is seen by ..  Patient seen by neurology who recommended MR C of the spine and psychiatric consult, and to hold, lumbar puncture as it is low yield with no clinical signs of nuchal rigidity or significant headache or white  blood cell count elevation,and was seen by neurology today,who advised discharge and outpatient follow up,after completion of MR-C spine and psychiatry  recommendations and follow up for pain management  -Patient awaiting MRC-spine, after confirmation, from neurology, about the safety of the V-P Shunt, and if the  shunt can be reprogrammed after MRI .Video EEG normal.  Contacted neurosurgery, for approval of MR-scan of neck, who indicated risks, associated with the  shunt, if this Is not an emergency.  Patient has been awaiting scan for planning discharge, Pain management doctor will be consulted, to manage pain meds,at her pain management,clinic.  Contacted  of neurosurgery ,who advised that the patient was offered a occipital nerve stimulator for the pain and, then patient had declined, and MR-C-spine was not indicated at this point, due to risks to the  shunt, involved.     Review of Systems   Review of Systems   Constitutional: Negative for chills, fever, malaise/fatigue and weight loss.   HENT: Negative for congestion, hearing loss, nosebleeds and sinus pain.    Eyes: Negative for blurred vision, double vision and photophobia.   Respiratory: Negative for cough, hemoptysis, shortness of breath and stridor.    Cardiovascular: Negative for chest pain and palpitations.   Gastrointestinal: Negative for constipation, diarrhea, nausea and vomiting.   Genitourinary: Negative for dysuria and urgency.   Musculoskeletal: Negative for back pain, myalgias and neck pain.   Skin: Negative for itching and rash.   Neurological: Positive for headaches. Negative for dizziness, tingling, tremors, sensory change, speech change and seizures.   Psychiatric/Behavioral: Negative for depression, memory loss and suicidal ideas. The patient is not nervous/anxious.              Consultants/Specialty  Neurology.  Neurosurgery  PCP: @PCP    Disposition  Inpatient  Quality Measures  Quality-Core Measures   Reviewed items::   Labs reviewed, Medications reviewed and Radiology images reviewed  DVT prophylaxis pharmacological::  Enoxaparin (Lovenox)          Physical Exam       Vitals:    06/18/18 0400 06/18/18 0702 06/18/18 1158 06/18/18 1555   BP: 105/60 126/76 101/55 141/83   Pulse: 97 95 100 100   Resp: 17 12 14 12   Temp: 36.3 °C (97.4 °F) 36.4 °C (97.6 °F) 36.6 °C (97.8 °F) 36.8 °C (98.2 °F)   TempSrc:       SpO2: 98% 100% 97% 92%   Weight:       Height:         Body mass index is 33.86 kg/m².    Oxygen Therapy:  Pulse Oximetry: 92 %, O2 (LPM): 0, O2 Delivery: None (Room Air)    Physical Exam   Constitutional: She is oriented to person, place, and time and well-developed, well-nourished, and in no distress.   HENT:   Head: Normocephalic and atraumatic.   Eyes: Pupils are equal, round, and reactive to light.   Neck: Neck supple. No tracheal deviation present. No thyromegaly present.   Cardiovascular: Normal rate, regular rhythm, normal heart sounds and intact distal pulses.  Exam reveals no gallop and no friction rub.    No murmur heard.  Pulmonary/Chest: Effort normal and breath sounds normal. No respiratory distress. She has no wheezes. She has no rales.   Abdominal: Soft. Bowel sounds are normal. She exhibits no distension. There is no tenderness. There is no rebound.   Musculoskeletal: Normal range of motion. She exhibits no edema or deformity.   Neurological: She is oriented to person, place, and time. No cranial nerve deficit. Coordination normal. GCS score is 15.   Skin: Skin is warm and dry. No erythema.   Psychiatric: Mood and affect normal.         Lab Data Review:         6/18/2018  4:31 PM    Recent Labs      06/16/18   0330  06/18/18   0305   SODIUM  141  141   POTASSIUM  4.0  3.9   CHLORIDE  106  106   CO2  30  29   BUN  6*  4*   CREATININE  0.53  0.52   CALCIUM  8.1*  8.5       Recent Labs      06/16/18   0330  06/18/18   0305   GLUCOSE  122*  103*       Recent Labs      06/16/18   0330   RBC  3.90*   HEMOGLOBIN  12.5    HEMATOCRIT  37.9   PLATELETCT  290       Recent Labs      06/16/18   0330   WBC  13.1*           Assessment/Plan     * Headache and focal deficits, likely secondary to Complicated migraine- (present on admission)   Assessment & Plan    Hx of intractable migraine, with unusual presentation on exam.   Questionable hx of psychogenic seizure, but also with hx of pseudotumor cerebri (s/p shunt) and record of CVA in 2007.   Notes leg weakness with indifference, and vague, changing pain complaints.   Possible ddx including complicated migraine, psychogenic seizure, or psychiatric problem;   however, not excluding potential seizure, infection, or medication reaction.   Last MRI - 5/29/18.  Within normal limits,-reveals ventriculostomy shunt catheter in place with no hydrocephalus and mild white matter change  Last EEG - 5/30/18.  Within normal limits  Plan:  Started on ergotamine 1 mg IV and p.o. Dilaudid 8 mg every 8 hours as needed for pain.  Patient gets good relief of pain on above medications.  Continue home medications of Benadryl and Toradol injections.  Continue keppra, per prior recommendation from neuro.   IR-lumbar puncture, not recommended.  Neurology note seen, can be discharged,for outpatient follow up once, after after MRC spine normal.  Discussed with neurosurgeon, , who advised against MRC spine at this point, as it is not a surgical emergency, and there is a possibility of the  shunt, developing problems, after the MR C-spine.  Psychiatry follow up note seen,have increased the Resperidal and gabapentin to Alleviate the pain and anxiety.   follow-up as outpatient if needed   Pain management specialist of  patient to be consulted .  Continue ASA-81.  Will start weaning patient off pain medication, after consultation with her pain management specialist.  Patient was counseled on the effects of narcotics and opiates and has agreed , to talk to her pain management specialist about tapering off her  narcotics, discharge plans and follow-up as appropriate as the patient is currently on Dilaudid and oxycodone which he uses as an outpatient.  We will start Depakote 500 mg twice daily for headaches.  Patient started on a low-dose ketamine drip in an effort to wean her off the opiates, and was also started on Depakote 500 mg twice daily for her headaches with the aim off weaning narcotics down to a baseline of 30 MSE, her pain management team at [facility], Ms. Uma Reece , nurse practitioner, was updated the message.        Cellulitis of finger - vs, vasculitis or calciphylaxis   Assessment & Plan    Patient with swelling and redness of left middle finger.  -Patient has a history of recurrent swellings of both hands in the past.  Being seen and followed by rheumatology, could be seronegative rheumatoid arthritis, as previous tests are all negative.  Plan  Continue prednisone 5 mg and Plaquenil as scheduled.  We will change antibiotic from clindamycin to Keflex and and monitor blood cultures for any significant growth.  X-rays, left hand and review.  Clinical examination shows low probability of cellulitis as the classic symptoms of heat increase localized temperature and tenderness are absent.   Imaging reveals periarticular swelling with demineralisation,with blood cultures revealing no growth.        Rheumatoid arthritis (HCC)- (present on admission)   Assessment & Plan    continuing on home dose prednisone and plaquenil.

## 2018-06-18 NOTE — PROGRESS NOTES
Assumed care of pt at 0700. Pt is A&Ox4. Pt denies n/v and n/t at this time. Medications given per MAR. Pt is standby assist with FWW to bathroom. Plan of care discussed. Family at bedside. Hourly rounding in place.

## 2018-06-18 NOTE — PROGRESS NOTES
Patient is resting in bed. Patient is A&Ox4. Patient c/o of headache, medication given per MAR. Bed in lowest position, call light within reach, bed alarm on. Hourly rounding in place.

## 2018-06-18 NOTE — THERAPY
"Occupational Therapy Treatment completed with focus on ADLs, ADL transfers, patient education, caregiver training and upper extremity function.  Functional Status:  CG  Plan of Care: Will benefit from Occupational Therapy 3 times per week  Discharge Recommendations:  Equipment Front-Wheel Walker, Shower Chair, Grab Bars and Will Continue to Assess for Equipment Needs. Post-acute therapy Discharge to home with outpatient or home health for additional skilled therapy services.    Patient seen for OT treat necessitating CG with ADLs and mobility with FWW. Patient demos and reports gains. Patient would continue to benefit from skilled OT in this setting prior to DC home with assist from family and services.     See \"Rehab Therapy-Acute\" Patient Summary Report for complete documentation.     "

## 2018-06-18 NOTE — THERAPY
"Pt w/improved balance, gait, and activity tolerance. Pt demonstrated slow jacob w/decreased heel-toe off, requiring verbal cuing. Pt w/no LOB or knee buckling. Pt w/poor safety awareness, requiring verbal cuing. Pt would benefit from further acute PT txs to progress towards goals and independence. Anticipate pt to be able to DC home w/family support and HH services. Will discuss w/primary PT.    Physical Therapy Treatment completed.   Bed Mobility:  Supine to Sit:  (NT--up w/OT)  Transfers: Sit to Stand: Supervised  Gait: Level Of Assist: Stand by Assist with Front-Wheel Walker       Plan of Care: Will benefit from Physical Therapy 3 times per week    See \"Rehab Therapy-Acute\" Patient Summary Report for complete documentation.       "

## 2018-06-18 NOTE — CARE PLAN
Problem: Fluid Volume:  Goal: Will maintain balanced intake and output  Outcome: PROGRESSING AS EXPECTED      Problem: Pain Management  Goal: Pain level will decrease to patient's comfort goal  Outcome: PROGRESSING AS EXPECTED  Pain assessed, medication given per MAR.

## 2018-06-19 ENCOUNTER — PATIENT OUTREACH (OUTPATIENT)
Dept: HEALTH INFORMATION MANAGEMENT | Facility: OTHER | Age: 48
End: 2018-06-19

## 2018-06-19 VITALS
BODY MASS INDEX: 33.87 KG/M2 | SYSTOLIC BLOOD PRESSURE: 130 MMHG | RESPIRATION RATE: 18 BRPM | WEIGHT: 191.14 LBS | DIASTOLIC BLOOD PRESSURE: 80 MMHG | OXYGEN SATURATION: 98 % | HEART RATE: 58 BPM | HEIGHT: 63 IN | TEMPERATURE: 97.6 F

## 2018-06-19 LAB
ANION GAP SERPL CALC-SCNC: 8 MMOL/L (ref 0–11.9)
BUN SERPL-MCNC: 8 MG/DL (ref 8–22)
CALCIUM SERPL-MCNC: 8.9 MG/DL (ref 8.5–10.5)
CHLORIDE SERPL-SCNC: 105 MMOL/L (ref 96–112)
CO2 SERPL-SCNC: 28 MMOL/L (ref 20–33)
CREAT SERPL-MCNC: 0.56 MG/DL (ref 0.5–1.4)
ERYTHROCYTE [DISTWIDTH] IN BLOOD BY AUTOMATED COUNT: 43.9 FL (ref 35.9–50)
GLUCOSE SERPL-MCNC: 90 MG/DL (ref 65–99)
HCT VFR BLD AUTO: 36.8 % (ref 37–47)
HGB BLD-MCNC: 12 G/DL (ref 12–16)
MCH RBC QN AUTO: 31.4 PG (ref 27–33)
MCHC RBC AUTO-ENTMCNC: 32.6 G/DL (ref 33.6–35)
MCV RBC AUTO: 96.3 FL (ref 81.4–97.8)
PLATELET # BLD AUTO: 297 K/UL (ref 164–446)
PMV BLD AUTO: 9.9 FL (ref 9–12.9)
POTASSIUM SERPL-SCNC: 4 MMOL/L (ref 3.6–5.5)
RBC # BLD AUTO: 3.82 M/UL (ref 4.2–5.4)
SODIUM SERPL-SCNC: 141 MMOL/L (ref 135–145)
WBC # BLD AUTO: 7.2 K/UL (ref 4.8–10.8)

## 2018-06-19 PROCEDURE — A9270 NON-COVERED ITEM OR SERVICE: HCPCS | Performed by: STUDENT IN AN ORGANIZED HEALTH CARE EDUCATION/TRAINING PROGRAM

## 2018-06-19 PROCEDURE — 85027 COMPLETE CBC AUTOMATED: CPT

## 2018-06-19 PROCEDURE — 700111 HCHG RX REV CODE 636 W/ 250 OVERRIDE (IP): Performed by: STUDENT IN AN ORGANIZED HEALTH CARE EDUCATION/TRAINING PROGRAM

## 2018-06-19 PROCEDURE — A9270 NON-COVERED ITEM OR SERVICE: HCPCS | Performed by: INTERNAL MEDICINE

## 2018-06-19 PROCEDURE — 99239 HOSP IP/OBS DSCHRG MGMT >30: CPT | Performed by: INTERNAL MEDICINE

## 2018-06-19 PROCEDURE — 700102 HCHG RX REV CODE 250 W/ 637 OVERRIDE(OP): Performed by: STUDENT IN AN ORGANIZED HEALTH CARE EDUCATION/TRAINING PROGRAM

## 2018-06-19 PROCEDURE — 80048 BASIC METABOLIC PNL TOTAL CA: CPT

## 2018-06-19 PROCEDURE — 700105 HCHG RX REV CODE 258: Performed by: STUDENT IN AN ORGANIZED HEALTH CARE EDUCATION/TRAINING PROGRAM

## 2018-06-19 PROCEDURE — 700102 HCHG RX REV CODE 250 W/ 637 OVERRIDE(OP): Performed by: INTERNAL MEDICINE

## 2018-06-19 RX ADMIN — SODIUM CHLORIDE: 9 INJECTION, SOLUTION INTRAVENOUS at 00:11

## 2018-06-19 RX ADMIN — STANDARDIZED SENNA CONCENTRATE AND DOCUSATE SODIUM 2 TABLET: 8.6; 5 TABLET, FILM COATED ORAL at 09:09

## 2018-06-19 RX ADMIN — LEVETIRACETAM 1000 MG: 500 TABLET ORAL at 09:10

## 2018-06-19 RX ADMIN — GABAPENTIN 900 MG: 300 CAPSULE ORAL at 09:11

## 2018-06-19 RX ADMIN — Medication 500 UNITS: at 15:56

## 2018-06-19 RX ADMIN — OMEPRAZOLE 20 MG: 20 CAPSULE, DELAYED RELEASE ORAL at 09:10

## 2018-06-19 RX ADMIN — DIVALPROEX SODIUM 500 MG: 500 TABLET, DELAYED RELEASE ORAL at 06:03

## 2018-06-19 RX ADMIN — HYDROXYCHLOROQUINE SULFATE 300 MG: 200 TABLET, FILM COATED ORAL at 09:09

## 2018-06-19 RX ADMIN — PREDNISONE 5 MG: 5 TABLET ORAL at 09:10

## 2018-06-19 RX ADMIN — CEPHALEXIN 500 MG: 500 CAPSULE ORAL at 00:09

## 2018-06-19 RX ADMIN — DIVALPROEX SODIUM 500 MG: 500 TABLET, DELAYED RELEASE ORAL at 13:09

## 2018-06-19 RX ADMIN — HYDROMORPHONE HYDROCHLORIDE 8 MG: 4 TABLET ORAL at 09:15

## 2018-06-19 RX ADMIN — ASPIRIN 81 MG: 81 TABLET, CHEWABLE ORAL at 09:09

## 2018-06-19 RX ADMIN — RISPERIDONE 1 MG: 0.5 TABLET ORAL at 09:10

## 2018-06-19 RX ADMIN — GABAPENTIN 900 MG: 300 CAPSULE ORAL at 14:54

## 2018-06-19 RX ADMIN — DULOXETINE HYDROCHLORIDE 60 MG: 60 CAPSULE, DELAYED RELEASE ORAL at 09:10

## 2018-06-19 RX ADMIN — HYDROMORPHONE HYDROCHLORIDE 8 MG: 4 TABLET ORAL at 15:03

## 2018-06-19 RX ADMIN — CEPHALEXIN 500 MG: 500 CAPSULE ORAL at 06:03

## 2018-06-19 RX ADMIN — CEPHALEXIN 500 MG: 500 CAPSULE ORAL at 13:09

## 2018-06-19 RX ADMIN — ENOXAPARIN SODIUM 40 MG: 100 INJECTION SUBCUTANEOUS at 09:11

## 2018-06-19 RX ADMIN — FERROUS SULFATE TAB 325 MG (65 MG ELEMENTAL FE) 325 MG: 325 (65 FE) TAB at 09:14

## 2018-06-19 ASSESSMENT — PAIN SCALES - GENERAL
PAINLEVEL_OUTOF10: 4
PAINLEVEL_OUTOF10: 4
PAINLEVEL_OUTOF10: 6
PAINLEVEL_OUTOF10: 6

## 2018-06-19 NOTE — PROGRESS NOTES
Pt. Discharged home with . Discharge instructions and explanation of medicaiton received by pt and . Pt and  verbalized understanding. Prescriptions provided.  took pt out in hospital wheelchair. Port de-accessed with a heparin lock. Pt tolerated well dressing applied. Pt has all belongings. All questions are answered.

## 2018-06-19 NOTE — CARE PLAN
Problem: Venous Thromboembolism (VTW)/Deep Vein Thrombosis (DVT) Prevention:  Goal: Patient will participate in Venous Thrombosis (VTE)/Deep Vein Thrombosis (DVT)Prevention Measures  Outcome: PROGRESSING AS EXPECTED  Lovenox in use.     Problem: Pain Management  Goal: Pain level will decrease to patient's comfort goal  Outcome: PROGRESSING AS EXPECTED  Pt complaining of headache,   Medicated per mar with dilaudid.   Ketamine stopped per MD.

## 2018-06-19 NOTE — CARE PLAN
Problem: Fluid Volume:  Goal: Will maintain balanced intake and output  Outcome: PROGRESSING AS EXPECTED  Pt on continuous IVF. Up to the bathroom voiding.    Problem: Pain Management  Goal: Pain level will decrease to patient's comfort goal  Outcome: PROGRESSING AS EXPECTED  Pt reports pain is controlled on ketamine drip. Reports pain 6/10 which is comfortable for her. Pt resting comfortably in bed throughout the night.

## 2018-06-19 NOTE — PROGRESS NOTES
Pt has slightly delayed responses, but still oriented and arouseable. Vital signs within normal limits. MD aware.

## 2018-06-19 NOTE — FACE TO FACE
Face to Face Note  -  Durable Medical Equipment    Freddy Chou M.D. - NPI: [id number]  I certify that this patient is under my care and that they had a durable medical equipment(DME)face to face encounter by myself that meets the physician DME face-to-face encounter requirements with this patient on:    Date of encounter:   Patient:                    MRN:                       YOB: 2016  Enedelia Pang  [id number]  1970     The encounter with the patient was in whole, or in part, for the following medical condition, which is the primary reason for durable medical equipment:  Other - weakness in the legs    I certify that, based on my findings, the following durable medical equipment is medically necessary:  Walkers.  HOME O2 Saturation Measurements:(Values must be present for Home Oxygen orders)  None  My Clinical findings support the need for the above equipment due to:  Abnormal Gait  Supporting Symptoms: Patient has mild persistent weakness, in both legs improving, and able to ambulate with the help of a walker without difficulty.  Patient would need a walker at home, for support, till she regains full strength.

## 2018-06-19 NOTE — DISCHARGE PLANNING
Received Choice form at 1521 from James)  Agency/Facility Name: [facility]  Referral sent per Choice form @ 1522.

## 2018-06-19 NOTE — PROGRESS NOTES
Received report from night nurse at the bedside. Assume care of Pt at 0700. Assessment completed Pt A&O X 4. Pt denies numbness and tingling, Pt denies complaints of pain,  Assist of one with fww. Pt in bed, bed in lowest position, call light in place, bed alarm is on, seizure precautions in place, treaded slipper socks on.

## 2018-06-19 NOTE — PROGRESS NOTES
Bedside report completed with Claudio FIGUEROA. Reviewed POC and safety precautions with pt. Pt on ketamine drip. Drowsy but arousable and oriented x4. Slow to respond. MD notified by Claudio FIGUEROA. No change further change in LOC and mentation. Will monitor.

## 2018-06-19 NOTE — DISCHARGE INSTRUCTIONS
Discharge Instructions    Discharged to home by car with relative. Discharged via wheelchair, hospital escort: Yes.  Special equipment needed: Walker    Be sure to schedule a follow-up appointment with your primary care doctor or any specialists as instructed.     Discharge Plan:   Diet Plan: Discussed  Activity Level: Discussed  Confirmed Follow up Appointment: Patient to Call and Schedule Appointment  Confirmed Symptoms Management: Discussed  Medication Reconciliation Updated: Yes  Influenza Vaccine Indication: Not indicated: Previously immunized this influenza season and > 8 years of age    I understand that a diet low in cholesterol, fat, and sodium is recommended for good health. Unless I have been given specific instructions below for another diet, I accept this instruction as my diet prescription.   Other diet: Regular diet    Special Instructions: None    · Is patient discharged on Warfarin / Coumadin?   No     Migraine Headache  A migraine headache is a very strong throbbing pain on one side or both sides of your head. Migraines can also cause other symptoms. Talk with your doctor about what things may bring on (trigger) your migraine headaches.  Follow these instructions at home:  Medicines  · Take over-the-counter and prescription medicines only as told by your doctor.  · Do not drive or use heavy machinery while taking prescription pain medicine.  · To prevent or treat constipation while you are taking prescription pain medicine, your doctor may recommend that you:  ¨ Drink enough fluid to keep your pee (urine) clear or pale yellow.  ¨ Take over-the-counter or prescription medicines.  ¨ Eat foods that are high in fiber. These include fresh fruits and vegetables, whole grains, and beans.  ¨ Limit foods that are high in fat and processed sugars. These include fried and sweet foods.  Lifestyle  · Avoid alcohol.  · Do not use any products that contain nicotine or tobacco, such as cigarettes and e-cigarettes.  If you need help quitting, ask your doctor.  · Get at least 8 hours of sleep every night.  · Limit your stress.  General instructions  · Keep a journal to find out what may bring on your migraines. For example, write down:  ¨ What you eat and drink.  ¨ How much sleep you get.  ¨ Any change in what you eat or drink.  ¨ Any change in your medicines.  · If you have a migraine:  ¨ Avoid things that make your symptoms worse, such as bright lights.  ¨ It may help to lie down in a dark, quiet room.  ¨ Do not drive or use heavy machinery.  ¨ Ask your doctor what activities are safe for you.  · Keep all follow-up visits as told by your doctor. This is important.  Contact a doctor if:  · You get a migraine that is different or worse than your usual migraines.  Get help right away if:  · Your migraine gets very bad.  · You have a fever.  · You have a stiff neck.  · You have trouble seeing.  · Your muscles feel weak or like you cannot control them.  · You start to lose your balance a lot.  · You start to have trouble walking.  · You pass out (faint).  This information is not intended to replace advice given to you by your health care provider. Make sure you discuss any questions you have with your health care provider.  Document Released: 09/26/2009 Document Revised: 07/07/2017 Document Reviewed: 06/05/2017  [employer]vier Interactive Patient Education © 2017 [employer]    What You Need to Know About Prescription Opioid Pain Medicine  Opioids are powerful medicines that are used to treat moderate to severe pain. Opioids should be taken with the supervision of a trained health care provider. They should be taken for the shortest period of time as possible. This is because opioids can be addictive and the longer you take opioids, the greater your risk of addiction (opioid use disorder).  What do opioids do?  Opioids help reduce or eliminate pain. When used for short periods of time, they can help you:  · Sleep better.  · Do better  in physical or occupational therapy.  · Feel better in the first few days after an injury.  · Recover from surgery.  What kind of problems can opioids cause?  Opioids can cause side effects, such as:  · Constipation.  · Nausea.  · Vomiting.  · Drowsiness.  · Confusion.  · Opioid use disorder.  · Breathing difficulties (respiratory depression).  Using opioid pain medicines for longer than 3 days increases your risk of these side effects.  Taking opioid pain medicine for a long period of time can affect your ability to do daily tasks. It also puts you at risk for:  · Car accidents.  · Heart attack.  · Overdose, which can sometimes lead to death.  What can increase my risk for developing problems while taking opioids?  You may be at an especially high risk for problems while taking opioids if you:  · Are over the age of 65.  · Are pregnant.  · Have kidney or liver disease.  · Have certain mental health conditions, such as depression or anxiety.  · Have a history of substance use disorder.  · Have had an opioid overdose in the past.  How do I stop taking opioids if I have been taking them for a long time?  If you have been taking opioid medicine for more than a few weeks, you may need to slowly stop taking them (taper). Tapering your use of opioids can decrease your chances of experiencing withdrawal symptoms, such as:  · Abdominal pain and cramping.  · Nausea.  · Sweating.  · Sleepiness.  · Restlessness.  · Uncontrollable shaking (tremors).  · Cravings for the medicine.  Do not attempt to taper your use of opioids on your own. Talk with your health care provider about how to do this. Your health care provider may prescribe a step-down schedule based on how much medicine you are taking and how long you have been taking it.  What are the benefits of stopping the use of opioids?  By switching from opioid pain medicine to non-opioid pain management options, you will decrease your risk of accidents and injuries associated  with long-term opioid use. You will also be able to:  · Monitor your pain more accurately and know when to seek medical care if it is not improving.  · Decrease risk to others around you. Having opioids in the home increases the risk for accidental or intentional use or overdose by others.  How can I treat pain without opioids?  Pain can be managed with many types of alternative treatments. Ask your health care provider to refer you to one or more specialists who can help you manage pain through:  · Physical or occupational therapy.  · Counseling (cognitive-behavioral therapy).  · Good nutrition.  · Biofeedback.  · Massage.  · Meditation.  · Non-opioid medicine.  · Following a gentle exercise program.  Where can I get support?  If you have been taking opioids for a long time, you may benefit from receiving support for quitting from a local support group or counselor. Ask your health care provider for a referral to these resources in your area.  When should I seek medical care?  Seek medical care right away if you are taking opioids and you experience any of the following:  · Difficulty breathing.  · Breathing that is more shallow or slower than normal.  · A very slow heartbeat (pulse).  · Severe confusion.  · Unconsciousness.  · Sleepiness.  · Difficulty waking from sleep.  · Slurred speech.  · Nausea and vomiting.  · Cold, clammy skin.  · Blue lips or fingernails.  · Limpness.  · Abnormally small pupils.  If you think that you or someone else may have taken too much of an opioid medicine, get medical help right away. Do not wait to see if the symptoms go away on their own. Call your local emergency services (970 in the [place] or call the hotline of the National Poison Control Center (527-098-6301 in the [place]   Where can I get more information?  To learn more about opioid medicines, visit the Centers for Disease Control and Prevention web site Opioid Basics at  https://www.cdc.gov/drugoverdos/opioids/index.html.  Summary  · Opioid medicines can help you manage moderate-to-severe pain for a short period of time.  · Taking opioid pain medicine for a long period of time puts you at risk for unintentional accidents, injury, and even death.  · If you think that you or someone else may have taken too much of an opioid, get medical help right away.  This information is not intended to replace advice given to you by your health care provider. Make sure you discuss any questions you have with your health care provider.  Document Released: 01/13/2017 Document Revised: 08/11/2017 Document Reviewed: 07/29/2016  [employer] Interactive Patient Education © 2017 [employer]        Depression / Suicide Risk    As you are discharged from this [facility] facility, it is important to learn how to keep safe from harming yourself.    Recognize the warning signs:  · Abrupt changes in personality, positive or negative- including increase in energy   · Giving away possessions  · Change in eating patterns- significant weight changes-  positive or negative  · Change in sleeping patterns- unable to sleep or sleeping all the time   · Unwillingness or inability to communicate  · Depression  · Unusual sadness, discouragement and loneliness  · Talk of wanting to die  · Neglect of personal appearance   · Rebelliousness- reckless behavior  · Withdrawal from people/activities they love  · Confusion- inability to concentrate     If you or a loved one observes any of these behaviors or has concerns about self-harm, here's what you can do:  · Talk about it- your feelings and reasons for harming yourself  · Remove any means that you might use to hurt yourself (examples: pills, rope, extension cords, firearm)  · Get professional help from the community (Mental Health, Substance Abuse, psychological counseling)  · Do not be alone:Call your Safe Contact- someone whom you trust who will be there for you.  · Call  your local CRISIS HOTLINE 746-8211 or 238-475-7534  · Call your local Children's Mobile Crisis Response Team [place] (669) 977-9655 or www.Feedgen  · Call the toll free National Suicide Prevention Hotlines   · National Suicide Prevention Lifeline 146-305-TVPY (9489)  · National Hope Line Network 800-SUICIDE (040-6346)

## 2018-06-19 NOTE — DISCHARGE SUMMARY
Internal Medicine Discharge Summary  Note Author: Freddy Chou M.D.       Admit Date:  6/13/2018       Discharge Date: 6/19/18    Service:   R Internal Medicine orange team  Attending Physician(s):       Senior Resident(s): Dr.Charles Jordan  Gerard Resident(s):   PCP:Elliott Power M.D.        Primary Diagnosis:   Intractable chronic migraine with nonepileptic seizures/tremors    Secondary Diagnoses:                Principal Problem:    Headache and focal deficits, likely secondary to Complicated migraine POA: Yes  Active Problems:    Cellulitis of finger - vs, vasculitis or calciphylaxis POA: Clinically Undetermined    Rheumatoid arthritis (HCC) POA: Yes    Stroke (HCC) POA: Yes      Overview: 2007  Resolved Problems:    * No resolved hospital problems. *      Hospital Summary (Brief Narrative):       Ms. Enedelia Pang is a 46-year-old female who has a past medical history of chronic/complex migraines rheumatoid arthritis versus other autoimmune diseases , and a history of CVA in 2007, history of pseudotumor cerebri status post shunt and possibly psychogenic seizures.  Patient  presented to the emergency department doctor at [facility] on the 6/14/2018 with jerking movements of the head, associated with headaches and increasing weakness in both legs since the last 2 days prior to presentation.  Patient was seen for multiple medical conditions in the past including a possible vasculitis and is being treated for migraines, also has hydrocephalus which has been treated with a  shunt, and is followed up by [facility] neurosurgery and by  rheumatology with Dr. Andrade and  by neurology by Dr. Ribera.  Patient also had a mild swelling of the left middle finger secondary to a possible vasculitis of the left hand, and presentation.   MR brain on 5/30 revealed a ventriculostomy shunt catheter in place with no hydrocephalus and mild white matter changes with no evidence of mesial  temporal sclerosis or gray matter heterotopia or cortical dysplasia or intracranial mass.  Patient was admitted to the medical floor for pain management for her headaches and for evaluation of, her weakness.  A neurology consult was called, which revealed that the headaches were chronic since the second decade with no papilledema and an EEG done on 6/15/2018 revealed no focal or generalized epileptiform activity.  Patient was ordered an MR-C-spine, but was deferred on the advice of neurosurgery, as patient has a programmable  shunt in place, which could be disrupted by the procedure.  Patient was also seen by psychiatric to rule out psychogenic causes of headaches who increased her Risperdal for mood stabilization/psychosi and advised follow-up with outpatient psychiatry if needed as patient is currently following outpatient mental health.  Patient was continued on Dilaudid and oxycodone with addition of Depakote 500 mg twice daily for severe headaches, with gradual decrease in the intensity of headaches.  She was also given a ketamine low-dose drip for 20 hours which relieved the pain and greatly like decreased the need for analgesic/opiate medications on discharge.  Patient's condition was discussed with her pain management team at [facility], with Uma Reece nurse practitioner who was updated of the pain management she received at [facility], and she was discharged on walker support due to mild  weakness in her legs, but advised to follow-up with her pain management specialist for further needs, keep up her appointment with outpatient mental health, and psychiatric follow-up if needed, and also follow-up with both neurosurgery and neurology.  Patient has been discharged on Nucenta 50 mg twice daily, for pain management, with her pain management specialist briefed on the pain medication.  Patient also given a walker, to help her with her mobility issues due to mild persistent weakness in both  legs.    Patient /Hospital Summary (Details -- Problem Oriented) :          * Headache and focal deficits, likely secondary to Complicated migraine   Assessment & Plan    Hx of intractable migraine, with unusual presentation on exam.   Questionable hx of psychogenic seizure, but also with hx of pseudotumor cerebri (s/p shunt) and record of CVA in 2007.   Notes leg weakness with indifference, and vague, changing pain complaints.   Possible ddx including complicated migraine, psychogenic seizure, or psychiatric problem;   however, not excluding potential seizure, infection, or medication reaction.   Last MRI - 5/29/18.  Within normal limits,-reveals ventriculostomy shunt catheter in place with no hydrocephalus and mild white matter change  Last EEG - 5/30/18.  Within normal limits  Plan:  Started on ergotamine 1 mg IV and p.o. Dilaudid 8 mg every 8 hours as needed for pain.  Patient gets good relief of pain on above medications.  Continue home medications of Benadryl and Toradol injections.  Continue keppra, per prior recommendation from neuro.   IR-lumbar puncture, not recommended.  Neurology note seen, can be discharged,for outpatient follow up once, after after MRC spine normal.  Discussed with neurosurgeon, , who advised against MRC spine at this point, as it is not a surgical emergency, and there is a possibility of the  shunt, developing problems, after the MR C-spine.  Psychiatry follow up note seen,have increased the Resperidal and gabapentin to Alleviate the pain and anxiety.   follow-up as outpatient if needed   Pain management specialist of  patient to be consulted .  Continue ASA-81.  Will start weaning patient off pain medication, after consultation with her pain management specialist.  Patient was counseled on the effects of narcotics and opiates and has agreed , to talk to her pain management specialist about tapering off her narcotics, discharge plans and follow-up as appropriate as the  patient is currently on Dilaudid and oxycodone which he uses as an outpatient.  We will start Depakote 500 mg twice daily for headaches.  Patient started on a low-dose ketamine drip in an effort to wean her off the opiates, and was also started on Depakote 500 mg twice daily for her headaches with the aim off weaning narcotics down to a baseline of 30 MSE, her pain management team at [facility], Ms. Uma Reece , nurse practitioner, was updated the message.  Was discharged on Nucenta 50 mg twice daily for 10 days, for pain management, until she follows up with the pain management specialist at the [facility]        Cellulitis of finger - vs, vasculitis or calciphylaxis   Assessment & Plan    Patient with swelling and redness of left middle finger.  -Patient has a history of recurrent swellings of both hands in the past.  Being seen and followed by rheumatology, could be seronegative rheumatoid arthritis, as previous tests are all negative.  Plan  Continue prednisone 5 mg and Plaquenil as scheduled.  We will change antibiotic from clindamycin to Keflex and and monitor blood cultures for any significant growth.  X-rays, left hand and review.  Clinical examination shows low probability of cellulitis as the classic symptoms of heat increase localized temperature and tenderness are absent.   Imaging reveals periarticular swelling with demineralisation,with blood cultures revealing no growth.        Rheumatoid arthritis (HCC)   Assessment & Plan    continuing on home dose prednisone and plaquenil.             Consultants:     Neurology-Viktor wyman.  Psychiatry -Alina Leon.      Procedures:        Video EEG.    Imaging/ Testing:      DX-SKULL-LIMITED 3-   Final Result      Intact shunt tubing on lateral view of the skull.      DX-HAND 3+ LEFT   Final Result      1.  Mild periarticular demineralization which is unchanged from the prior study.   2.  Apparent diffuse soft tissue swelling about the hand  and digits.   3.  No acute osseous abnormality.      CT head without contrast 6/11/18 revealed no acute intracranial abnormality  Discharge Medications:         Medication Reconciliation: Completed       Medication List      START taking these medications      Instructions   Tapentadol HCl 50 MG Tabs   Take 50 mg by mouth every 12 hours for 10 days.  Dose:  50 mg        CONTINUE taking these medications      Instructions   aspirin 81 MG Chew chewable tablet  Commonly known as:  ASA   Take 81 mg by mouth every morning.  Dose:  81 mg     CALCIUM + D PO   Take 1 Tab by mouth every morning.  Dose:  1 Tab     diphenhydrAMINE 50 MG/ML Soln  Commonly known as:  BENADRYL   50 mg by Intramuscular route every 6 hours as needed. With Toradol for migraines  Dose:  50 mg     DULoxetine 60 MG Cpep delayed-release capsule  Commonly known as:  CYMBALTA   Take 60 mg by mouth 2 times a day.  Dose:  60 mg     gabapentin 600 MG tablet  Commonly known as:  NEURONTIN   Take 600 mg by mouth 3 times a day.  Dose:  600 mg     hydroxychloroquine 200 MG Tabs  Commonly known as:  PLAQUENIL   Take 1.5 Tabs by mouth every day.  Dose:  300 mg     ketorolac 60 MG/2ML Soln  Commonly known as:  TORADOL   60 mg by Intramuscular route every 6 hours as needed. With Benadryl for migraines  Dose:  60 mg     levetiracetam 1000 MG tablet  Commonly known as:  KEPPRA   Take 1 Tab by mouth 2 Times a Day for 30 days.  Dose:  1000 mg     omeprazole 20 MG delayed-release capsule  Commonly known as:  PRILOSEC   Take 1 Cap by mouth every day.  Dose:  20 mg     predniSONE 5 MG Tabs  Commonly known as:  DELTASONE   Take 5 mg by mouth every morning.  Dose:  5 mg     risperiDONE 1 MG Tabs  Commonly known as:  RISPERDAL   Take 1 mg by mouth 2 times a day.  Dose:  1 mg        ASK your doctor about these medications      Instructions   temazepam 15 MG Caps  Commonly known as:  RESTORIL  Ask about: Should I take this medication?   Take 1 Cap by mouth at bedtime as needed  for up to 14 days.  Dose:  15 mg            Can use .DISCHARGEMEDSLIST if going to another facility         Disposition: Inpatient for home    Diet: As tolerated    Activity: As tolerated    Instructions:      Follow-up with PCP.  Follow with pain management specialist at [facility].  Follow-up with neurology.  Follow-up with neurosurgery  The patient was instructed to return to the ER in the event of worsening symptoms. I have counseled the patient on the importance of compliance and the patient has agreed to proceed with all medical recommendations and follow up plan indicated above.   The patient understands that all medications come with benefits and risks. Risks may include permanent injury or death and these risks can be minimized with close reassessment and monitoring.        Primary Care Provider: Elliott Power M.D.    Discharge summary faxed to primary care provider:  Completed  Copy of discharge summary given to the patient: Completed      Follow up appointment details :      Patient advised follow-up with pain management and primary care physician    Pending Studies:        None    Time spent on discharge day patient visit, preparing discharge paperwork and arranging for patient follow up.    Summary of follow up issues:   Follow-up with PCP,  neurology and neurosurgery    Discharge Time (Minutes) : 45 minute  Hospital Course Type:  Inpatient Stay >2 midnights      Condition on Discharge stable.  ______________________________________________________________________    Interval history/exam for day of discharge:     Patient being seen by physical therapy/Occupational Therapy who advised therapy 3 times per week.  Pain management team at [facility] , and were briefed about the low-dose ketamine protocol and the need to taper down the opiates.  Message conveyed to Ms. Uma Reece ,N/P under Dr. Mahoney, of neurosurgery.  Patient had Depakote added for nonnarcotic headache control and  the need to wean narcotics to at least a baseline of 30 MSE recommended.  Patient to be discharged, on Nucenta 50 mg twice daily for 10 days, and  at [facility] neurosurgery briefed, about the need for opiate tapering, and pain management protocol she underwent a [facility]  Patient reported to having improvement of her  migrainous headache, no nausea or vomiting,seen.No bowel or bladder problems .Patient has been ambulating with the help of a walker on the villalobos.  -On follow-up with neurology and had MRI of the brain and EEG, which revealed ventriculostomy shunt catheter in place with no hydrocephalus and mild white matter change.  An EEG study on 5/30 is within normal limits  -Patient has seronegative arthritis and is on follow-up with rheumatology as outpatient for workup for vasculitis with ESR CRP MG,  C3/C4, ANCA serologies, and antiphospholipid antibodies negative.  Patient is being followed up by rheumatology and is seen by ..  Patient seen by neurology who recommended MR C of the spine and psychiatric consult, and to hold, lumbar puncture as it is low yield with no clinical signs of nuchal rigidity or significant headache or white blood cell count elevation,and was seen by neurology today,who advised discharge and outpatient follow up,after completion of MR-C spine and psychiatry  recommendations and follow up for pain management  -Patient awaiting MRC-spine, after confirmation, from neurology, about the safety of the V-P Shunt, and if the  shunt can be reprogrammed after MRI .Video EEG normal.  Contacted neurosurgery, for approval of MR-scan of neck, who indicated risks, associated with the  shunt, if this Is not an emergency.  Patient has been awaiting scan for planning discharge, Pain management doctor will be consulted, to manage pain meds,at her pain management,clinic.  Contacted  of neurosurgery ,who advised that the patient was offered a occipital nerve  stimulator for the pain and, then patient had declined, and MR-C-spine was not indicated at this point, due to risks to the  shunt, involved.       Vitals:    06/19/18 0200 06/19/18 0400 06/19/18 0600 06/19/18 0800   BP:  118/63  130/80   Pulse:  77  (!) 58   Resp: 20 17 20 18   Temp:  36.7 °C (98 °F)  36.4 °C (97.6 °F)   TempSrc:       SpO2: 98% 100% 95% 98%   Weight:       Height:         Weight/BMI: Body mass index is 33.86 kg/m².  Pulse Oximetry: 98 %, O2 (LPM): 3, O2 Delivery: Oxymask  Physical Exam   Constitutional: She is oriented to person, place, and time and well-developed, well-nourished, and in no distress.   HENT:   Head: Normocephalic and atraumatic.   Eyes: Pupils are equal, round, and reactive to light.   Neck: Neck supple. No tracheal deviation present. No thyromegaly present.   Cardiovascular: Normal rate, regular rhythm, normal heart sounds and intact distal pulses.  Exam reveals no gallop and no friction rub.    No murmur heard.  Pulmonary/Chest: Effort normal and breath sounds normal. No respiratory distress. She has no wheezes. She has no rales.   Abdominal: Soft. Bowel sounds are normal. She exhibits no distension. There is no tenderness. There is no rebound.   Musculoskeletal: Normal range of motion. She exhibits no edema or deformity.   Neurological: She is oriented to person, place, and time. No cranial nerve deficit. Coordination normal. GCS score is 15.   Skin: Skin is warm and dry. No erythema.   Psychiatric: Mood and affect normal.        Most Recent Labs:    Lab Results   Component Value Date/Time    WBC 7.2 06/19/2018 02:45 AM    RBC 3.82 (L) 06/19/2018 02:45 AM    HEMOGLOBIN 12.0 06/19/2018 02:45 AM    HEMATOCRIT 36.8 (L) 06/19/2018 02:45 AM    MCV 96.3 06/19/2018 02:45 AM    MCH 31.4 06/19/2018 02:45 AM    MCHC 32.6 (L) 06/19/2018 02:45 AM    MPV 9.9 06/19/2018 02:45 AM    NEUTSPOLYS 68.40 06/13/2018 07:52 PM    LYMPHOCYTES 23.70 06/13/2018 07:52 PM    MONOCYTES 6.10 06/13/2018  07:52 PM    EOSINOPHILS 0.30 06/13/2018 07:52 PM    BASOPHILS 0.90 06/13/2018 07:52 PM    HYPOCHROMIA 1+ 01/11/2017 02:29 PM    ANISOCYTOSIS 1+ 01/11/2017 02:29 PM      Lab Results   Component Value Date/Time    SODIUM 141 06/19/2018 02:45 AM    POTASSIUM 4.0 06/19/2018 02:45 AM    CHLORIDE 105 06/19/2018 02:45 AM    CO2 28 06/19/2018 02:45 AM    GLUCOSE 90 06/19/2018 02:45 AM    BUN 8 06/19/2018 02:45 AM    CREATININE 0.56 06/19/2018 02:45 AM      Lab Results   Component Value Date/Time    ALTSGPT 18 06/15/2018 03:00 AM    ASTSGOT 13 06/15/2018 03:00 AM    ALKPHOSPHAT 66 06/15/2018 03:00 AM    TBILIRUBIN 0.4 06/15/2018 03:00 AM    DBILIRUBIN <0.1 04/12/2018 03:14 AM    LIPASE 17 10/12/2017 12:59 AM    ALBUMIN 4.0 06/15/2018 03:00 AM    ALBUMIN 4.69 11/06/2017 10:23 AM    GLOBULIN 2.4 06/15/2018 03:00 AM    INR 1.13 06/13/2018 08:05 PM    MACROCYTOSIS 1+ 08/30/2015 04:10 PM     Lab Results   Component Value Date/Time    PROTHROMBTM 14.2 06/13/2018 08:05 PM    INR 1.13 06/13/2018 08:05 PM      DX-SKULL-LIMITED 3-   Final Result      Intact shunt tubing on lateral view of the skull.      DX-HAND 3+ LEFT   Final Result      1.  Mild periarticular demineralization which is unchanged from the prior study.   2.  Apparent diffuse soft tissue swelling about the hand and digits.   3.  No acute osseous abnormality.      Freddy Chou

## 2018-06-19 NOTE — PROGRESS NOTES
FW walker was delivered and fitted to patient.  If any further assistance needed, please call extension [phone] or place order for Ortho Technician assistance as a communication order in [employer].

## 2018-06-19 NOTE — DISCHARGE PLANNING
Anticipated Discharge Disposition: Home    Action: LSW met with pt and pt's spouse, bedside. Explained need for FWW and IMM. CHOICE obtained and IMM copy left. No other needs at this time.    IMM placed in pt's chart    CHOICE faxed to Elida LUNA    Barriers to Discharge: None    Plan: Pt to dc home

## 2018-06-20 ENCOUNTER — PATIENT OUTREACH (OUTPATIENT)
Dept: HEALTH INFORMATION MANAGEMENT | Facility: OTHER | Age: 48
End: 2018-06-20

## 2018-06-21 ENCOUNTER — TELEPHONE (OUTPATIENT)
Dept: NEUROLOGY | Facility: MEDICAL CENTER | Age: 48
End: 2018-06-21

## 2018-06-21 NOTE — TELEPHONE ENCOUNTER
We were notified by [facility] that we are not provider for patient's insurance, and cannot do LP here. Dr Ribera aware.

## 2018-08-01 ENCOUNTER — OFFICE VISIT (OUTPATIENT)
Dept: RHEUMATOLOGY | Facility: PHYSICIAN GROUP | Age: 48
End: 2018-08-01

## 2018-08-01 VITALS
BODY MASS INDEX: 29.76 KG/M2 | DIASTOLIC BLOOD PRESSURE: 60 MMHG | RESPIRATION RATE: 16 BRPM | OXYGEN SATURATION: 94 % | SYSTOLIC BLOOD PRESSURE: 102 MMHG | TEMPERATURE: 96.9 F | WEIGHT: 168 LBS | HEART RATE: 97 BPM

## 2018-08-01 DIAGNOSIS — L98.499 SKIN ULCER, UNSPECIFIED ULCER STAGE (HCC): ICD-10-CM

## 2018-08-01 DIAGNOSIS — R22.9 SOFT TISSUE SWELLING: ICD-10-CM

## 2018-08-01 DIAGNOSIS — J34.89 NASAL SEPTAL PERFORATION: ICD-10-CM

## 2018-08-01 PROCEDURE — 99214 OFFICE O/P EST MOD 30 MIN: CPT | Performed by: INTERNAL MEDICINE

## 2018-08-01 RX ORDER — LEVETIRACETAM 1000 MG/1
1500 TABLET ORAL 2 TIMES DAILY
COMMUNITY
End: 2019-08-31

## 2018-08-01 RX ORDER — PREDNISONE 5 MG/1
TABLET ORAL
Qty: 115 TAB | Refills: 0 | Status: SHIPPED | OUTPATIENT
Start: 2018-08-01 | End: 2018-10-11

## 2018-08-01 RX ORDER — PREDNISONE 5 MG/1
TABLET ORAL
Qty: 50 TAB | Refills: 0 | Status: SHIPPED | OUTPATIENT
Start: 2018-08-01 | End: 2018-10-11

## 2018-08-01 ASSESSMENT — ENCOUNTER SYMPTOMS
FEVER: 0
HEMOPTYSIS: 0
ABDOMINAL PAIN: 0
CHILLS: 0
COUGH: 0

## 2018-08-01 ASSESSMENT — PAIN SCALES - GENERAL: PAINLEVEL: NO PAIN

## 2018-08-01 NOTE — PROGRESS NOTES
Subjective:   Date of Consultation:8/1/2018  6:46 AM  Primary care physician:Elliott Power M.D.    Reason for Consultation:  Ms. Pang  is a pleasant 46 y.o. year old female who presented with follow-up seronegative Rheumatoid arthritis    Seronegative Rheumatoid Arthritis, non erosive  At last visit we continue prenidosne 5 mg daily and twice a week she needs 10 mg twice a week.  Still on plaquenil 300 mg q 6 months 3/28/2018 noted no macular toxicity  MRI did not show synovitis but had soft tissue swelling.  The swelling in her hand is responsive to prednisone  New skin lesion on the right hand dorsal aspect    [facility] slide review, per patient, were inconclusive      Migraines  Continue depakote      RHEUM HISTORY:   Ms. Enedelia Pang presented in January 2016 with joint pain and swelling of hands,knees, and weakness and a 3 month history of septal perforation.  ENT initial evaluation noted no disease activity.  She also has a history of abnormal LFT with unclear etiology.  She has had a work-up with GI in early 2017 which was unrevealing.  Also she has had a recurrent rash described as papules that ulcerate identified as MRSA infections.   Her labs did show thrombocytosis which could be inflammatory and anemia which was determined to be iron deficiency anemia.  Her rheumatologic work-up showed RF(-), MG (-), ANCA (-), SCL 70 (-) and anti centromere (-)  Parvovirus and rubella IgM was negative    Hand xrays did show periarticular osteopenia.    1/9/2017  Hepatitis B surface antigen negative  Hepatitis B core antibody negative  Hepatitis C antibody negative  quantiferon negative   HIV negative     CXR 12/6/2017 was negative for edema     11/2016 Nasal biopsy did not show active disease    Previous Medication:  Sulfasalazine 1000 mg BID (off)  Methotrexate 5 tabs weekly q Thursday (off)  Folic acid  Off prednisone    Epistaxis with nasal perforation  Perforation was stable  Did not make ENT  appointment due to hospitalization for seizures.    Elevated liver enzymes  Off methotrexate due to elevated LFT.    Stable  She has had a liver biopsy 7/2017.  The liver biopsy suggests it is due to methotrexate toxicity however her elevated LFT had been ongoing prior to start of methotrexate  LFT have been normal.     Chronic diarrhea  On and off diarrhea  (not addressed today)    Renal cysts  Bilateral  Plan to see nephrology    History of retinal disease  She did see ophthalmology     Past Medical History:  Pseudotumor cerebri, MRSA infection history, CVA, miraines, pituatary tumor, migraine headaches, septal perforation. No history of blood clots    Past Medical History:   Diagnosis Date   • Allergy, unspecified not elsewhere classified    • Anemia 10/05/2017    Unsure if a current issue.   • Anxiety    • autoimmune    • Depression    • H/O Clostridium difficile infection    • Hx MRSA infection    • Hydrocephalus     with lumbar shunt   • Migraine with aura, with intractable migraine, so stated, without mention of status migrainosus    • MRSA (methicillin resistant Staphylococcus aureus)    • Pituitary abnormality (HCC)    • Staph infection    • Stroke (HCC)     2007     Past Surgical History:   Procedure Laterality Date   •  SHUNT INSERTION  5/31/2017    Procedure:  SHUNT INSERTION;  Surgeon: Drew Berry M.D.;  Location: [facility];  Service:    • SPINAL CORD STIMULATOR  12/19/2016    Procedure: SPINAL CORD STIMULATOR FOR: PLACEMENT OF LEFT FLANK OCCIPITAL NERVE STIMULATOR BATTERY PACK;  Surgeon: Oli Oh III, M.D.;  Location: [facility];  Service:    • LUMBAR EXPLORATION N/A 8/31/2015    Procedure: LUMBAR EXPLORATION- Lumbar shunt removal ;  Surgeon: Oli Oh III, M.D.;  Location: [facility];  Service:    • IRRIGATION & DEBRIDEMENT NEURO N/A 6/28/2015    Procedure: IRRIGATION & DEBRIDEMENT NEURO;  Surgeon: Oli Oh III, M.D.;  Location: SURGERY  "[facility];  Service:    • APPENDECTOMY     • CHOLECYSTECTOMY     • OTHER      right knee surgery   • OTHER NEUROLOGICAL SURG      occipital nerve stimulator   • TONSILLECTOMY     • TUBAL LIGATION       Allergies   Allergen Reactions   • Sumatriptan Unspecified     Historical=\"Heart stops.\" Reaction  between 1995 to 1997   • Prochlorperazine Swelling     Tongue swelling. Reaction as a teen. (compazine)  Tolerated Phenergan on 02/24/15   • Vancomycin Shortness of Breath and Rash     Reaction in 2005.  D/W patient 8/31/15 - tolerated loading dose of vancomycin administered on 8/30/15 with some itching to chest with decreased infusion rate. Red Man Syndrome     Outpatient Encounter Prescriptions as of 8/1/2018   Medication Sig Dispense Refill   • levetiracetam (KEPPRA) 1000 MG tablet Take  by mouth.     • predniSONE (DELTASONE) 5 MG Tab 20 mg po q day x 5 days then decrease every 5 day by 5 mg 50 Tab 0   • predniSONE (DELTASONE) 5 MG Tab 5 mg po q day may increase to 10 mg  Po q day if needed 115 Tab 0   • risperiDONE (RISPERDAL) 1 MG Tab Take 1 mg by mouth 2 times a day.     • hydroxychloroquine (PLAQUENIL) 200 MG Tab Take 1.5 Tabs by mouth every day. 45 Tab 1   • diphenhydrAMINE (BENADRYL) 50 MG/ML Solution 50 mg by Intramuscular route every 6 hours as needed. With Toradol for migraines     • ketorolac (TORADOL) 60 MG/2ML Solution 60 mg by Intramuscular route every 6 hours as needed. With Benadryl for migraines     • predniSONE (DELTASONE) 5 MG Tab Take 5 mg by mouth every morning.     • Calcium Citrate-Vitamin D (CALCIUM + D PO) Take 1 Tab by mouth every morning.     • aspirin (ASA) 81 MG Chew Tab chewable tablet Take 81 mg by mouth every morning.     • omeprazole (PRILOSEC) 20 MG delayed-release capsule Take 1 Cap by mouth every day. 30 Cap 2   • gabapentin (NEURONTIN) 600 MG tablet Take 600 mg by mouth 3 times a day.     • duloxetine (CYMBALTA) 60 MG CPEP delayed-release capsule Take 60 mg by mouth 2 times a " day.       No facility-administered encounter medications on file as of 8/1/2018.        Social History     Social History   • Marital status:      Spouse name: N/A   • Number of children: N/A   • Years of education: N/A     Occupational History   •       on disability     Social History Main Topics   • Smoking status: Never Smoker   • Smokeless tobacco: Never Used   • Alcohol use Yes      Comment: Rare   • Drug use: No   • Sexual activity: Not on file     Other Topics Concern   • Not on file     Social History Narrative   • No narrative on file      History   Smoking Status   • Never Smoker   Smokeless Tobacco   • Never Used     History   Alcohol Use   • Yes     Comment: Rare     History   Drug Use No      OB History   No data available      No LMP recorded.    G P A L     Family History   Problem Relation Age of Onset   • No Known Problems Mother    • No Known Problems Father        Review of Systems   Constitutional: Negative for chills and fever.   Respiratory: Negative for cough and hemoptysis.    Gastrointestinal: Negative for abdominal pain.   Musculoskeletal: Positive for joint pain.        Hand swelling and joint pain   Skin: Positive for rash.                  Objective:   /60   Pulse 97   Temp 36.1 °C (96.9 °F)   Resp 16   Wt 76.2 kg (168 lb)   SpO2 94%   BMI 29.76 kg/m²    Vitals:    08/01/18 0800   BP: 102/60   Pulse: 97   Resp: 16   Temp: 36.1 °C (96.9 °F)   SpO2: 94%   Weight: 76.2 kg (168 lb)         Physical Exam   Constitutional: She is oriented to person, place, and time. She appears well-developed and well-nourished. No distress.   HENT:   Head: Normocephalic and atraumatic.   Right Ear: External ear normal.   Left Ear: External ear normal.   Eyes: Pupils are equal, round, and reactive to light. Conjunctivae are normal. Right eye exhibits no discharge. Left eye exhibits no discharge. No scleral icterus.   Cardiovascular: Normal rate, regular rhythm and normal  heart sounds.    Pulmonary/Chest: Effort normal. No stridor. No respiratory distress.   Musculoskeletal: She exhibits no edema.   Today the left hand remains swollen with soft tissue swelling.  The left hand 4th digit notes swelling. Good ROM of her wrists bilateral flexion and extension.   No periarticular swelling of the MCP, PIP of the right hand. Good ROM flexion and extension of elbows bilateral.    Lymphadenopathy:     She has no cervical adenopathy.   Neurological: She is alert and oriented to person, place, and time.   Skin: Skin is warm and dry. She is not diaphoretic.   No alopecia.  On the dorsum of the hand small lesions.  Also on the foot a healing lesion on the dorsal aspect.   Psychiatric: She has a normal mood and affect. Her behavior is normal. Judgment and thought content normal.       Assessment:     1. Soft tissue swelling  RHEUMATOID ARTHRITIS FACTOR    WESTERGREN SED RATE    CRP QUANTITIVE (NON-CARDIAC)    HEPATIC FUNCTION PANEL    CBC WITH DIFFERENTIAL    CREATININE    BUN   2. Nasal septal perforation     3. Skin ulcer, unspecified ulcer stage (HCC)       Labs:    Lab Results   Component Value Date/Time    QNTTBGOLD Negative 01/09/2017 06:01 PM     Lab Results   Component Value Date/Time    HEPBCORTOT Negative 04/12/2018 03:14 AM    HEPBCORIGM Negative 05/21/2017 02:07 AM    HEPBSAG Negative 04/12/2018 03:14 AM     Lab Results   Component Value Date/Time    HEPCAB Negative 04/12/2018 03:14 AM     Lab Results   Component Value Date/Time    SODIUM 141 06/19/2018 02:45 AM    POTASSIUM 4.0 06/19/2018 02:45 AM    CHLORIDE 105 06/19/2018 02:45 AM    CO2 28 06/19/2018 02:45 AM    GLUCOSE 90 06/19/2018 02:45 AM    BUN 8 06/19/2018 02:45 AM    CREATININE 0.56 06/19/2018 02:45 AM      Lab Results   Component Value Date/Time    WBC 7.2 06/19/2018 02:45 AM    RBC 3.82 (L) 06/19/2018 02:45 AM    HEMOGLOBIN 12.0 06/19/2018 02:45 AM    HEMATOCRIT 36.8 (L) 06/19/2018 02:45 AM    MCV 96.3 06/19/2018 02:45 AM     MCH 31.4 06/19/2018 02:45 AM    MCHC 32.6 (L) 06/19/2018 02:45 AM    MPV 9.9 06/19/2018 02:45 AM    NEUTSPOLYS 68.40 06/13/2018 07:52 PM    LYMPHOCYTES 23.70 06/13/2018 07:52 PM    MONOCYTES 6.10 06/13/2018 07:52 PM    EOSINOPHILS 0.30 06/13/2018 07:52 PM    BASOPHILS 0.90 06/13/2018 07:52 PM    HYPOCHROMIA 1+ 01/11/2017 02:29 PM    ANISOCYTOSIS 1+ 01/11/2017 02:29 PM      Lab Results   Component Value Date/Time    CALCIUM 8.9 06/19/2018 02:45 AM    ASTSGOT 13 06/15/2018 03:00 AM    ALTSGPT 18 06/15/2018 03:00 AM    ALKPHOSPHAT 66 06/15/2018 03:00 AM    TBILIRUBIN 0.4 06/15/2018 03:00 AM    ALBUMIN 4.0 06/15/2018 03:00 AM    ALBUMIN 4.69 11/06/2017 10:23 AM    TOTPROTEIN 6.4 06/15/2018 03:00 AM    TOTPROTEIN 7.50 11/06/2017 10:23 AM     Lab Results   Component Value Date/Time    URICACID 4.3 09/15/2017 03:45 PM    RHEUMFACTN <10 10/14/2017 07:40 PM    ANTINUCAB None Detected 01/06/2017 04:12 AM     Lab Results   Component Value Date/Time    SEDRATEWES 19 04/12/2018 03:14 AM    CREACTPROT 0.41 04/12/2018 03:14 AM     Lab Results   Component Value Date/Time    RUSSELVIPER 40.3 09/15/2017 03:45 PM    DRVVTINTERP Not Present 09/15/2017 03:45 PM     Lab Results   Component Value Date/Time    L2VJKQDRVBW 178.0 01/06/2017 04:12 AM    K5ERYPHAMED 37.0 01/06/2017 04:12 AM    IGGCARDIOLI 5 09/15/2017 03:45 PM    IGMCARDIOLI 6 09/15/2017 03:45 PM    IGACARDIOLI 2 09/15/2017 03:45 PM    CRYOGLOBULIN NEG 72Hour 04/17/2017 03:53 PM     Lab Results   Component Value Date/Time    DECAHWV06 0 01/06/2017 04:12 AM    CENTROMBAB 2 01/06/2017 04:12 AM     Lab Results   Component Value Date/Time    ANCAIGG <1:20 01/06/2017 04:12 AM    N4AUQUHCXVJ 178.0 01/06/2017 04:12 AM    ANTISSBSJ 0 11/18/2017 04:30 AM     Lab Results   Component Value Date/Time    COLORURINE Yellow 06/14/2018 03:10 AM    SPECGRAVITY 1.007 06/14/2018 03:10 AM    PHURINE 7.0 06/14/2018 03:10 AM    GLUCOSEUR Negative 06/14/2018 03:10 AM    KETONES Negative  06/14/2018 03:10 AM    PROTEINURIN Negative 06/14/2018 03:10 AM     No results found for: TOTPROTUR, TOTALVOLUME, [id number]  Lab Results   Component Value Date/Time    SSA60 14 11/18/2017 04:30 AM    SSA52 0 11/18/2017 04:30 AM     Lab Results   Component Value Date/Time    HBA1C 5.0 11/16/2017 11:12 PM     Lab Results   Component Value Date/Time    CPKTOTAL 32 11/17/2017 04:43 AM     Lab Results   Component Value Date/Time    G6PD 20.4 (H) 01/10/2017 01:54 PM     Lab Results   Component Value Date/Time    CZXS78HAXO Negative 10/14/2017 07:40 PM     No results found for: ACESERUM  Lab Results   Component Value Date/Time    25HYDROXY 62 02/22/2018 09:36 AM     No results found for: TSH, FREEDIR  Lab Results   Component Value Date/Time    TSHULTRASEN 4.600 05/28/2018 08:57 AM    FREET4 0.78 11/20/2017 03:00 AM     Lab Results   Component Value Date/Time    MICROSOMALA 27.3 (H) 02/22/2018 09:36 AM    ANTITHYROGL <0.9 02/22/2018 09:36 AM     No results found for: IGGLYMEABS  Lab Results   Component Value Date/Time    ANTIMITOCHO 8.5 04/12/2018 03:14 AM    FACTIN 6 04/12/2018 03:14 AM     Lab Results   Component Value Date/Time     06/15/2018 03:00 AM    TTRANSIGA 0 06/15/2018 03:00 AM     No results found for: FLTYPE, CRYSTALSBDF  No results found for: ISTATICAL  No results found for: ISTATCREAT  No results found for: CTELOPEP  No results found for: GBMABG  No results found for: PTHINTACT  1/27/2015 hepatitis E IgM    1/27/2015 ceruloplasmin = 29.7  1/27/2015 smith antibody = 9 (normal)  1/27/2015 MG negative  1/27/2015 alpha antitrypsin 119 ()  1/27/2015 ferritin = 75 (normal)  1/27/2015 GGT = 327 (0-55)    1/27/2015 IgG 4 = 11  10/31/2016 and 1/27/2015  p ANCA (-), c ANCA (-), PR3 (-)  10/31/2016 MPO (-)  12/7/2014 AST = 23 ALT = 137 Alk phos = 205  6/10/2016 ALT = 921 AST = 821 Alk phos = 537  Labs from JUne 2016 shows low albumin 2.7 and AST 92 and ALT of 370    6/11/2016; AST=92, ALT  = 370 Alk Phos  = 331 Hgb = 8.6 MCV = 74.7 t bili = 1.2  10/15/2015: Hgb = 9.1 Plt =   ALT= 357 AST =127 Alk phos = 469 creatinine = 0.7    US LE venous doppler bilateral 6/30/2014  Probable baker's cyst on left lower extremity    Left hand XR 8/14/2014  Soft tissue swelling over the dorsum.  No erorsons no fractures     CT abdomen/Pelvis w/o Contrast 9/4/2014  Small amount of fluid in cul-de-sac no abnormalities in liver.  Abdominal aorta was normal.  Adrenal glands normal.  coritical medullatry calcifications noted of left kidney    CT head w w/o contrast 10/15/2016  No acute abnormalities and on CT maxillofacial w/ contrast - no evidence of sinusitis    CTchest w/con 1/12/2016  No findings to suggest aortic dissection or pulmonary embolus  Left renal 18 mm cyst in the ventral cortex medially    CT spine lumbar w/o contrast  8/30/2015  Small fluid  Collection posterior junction of the epidural catheter seen right lateral to the L2 posterior spinous process consistent with leakage and/or infection      Blood work performed at outside labs dated May 6, 2017 white blood cell count of 7 total protein is 7.8 albumin is 4.1 monocytes 1010.7 neutrophils were normal at 5.33 and lymphocytes were normal at 9 point correction 0.94 platelets were 233     Labs from June 22, 2017 showed a hemoglobin of 14.3 AST was normalized at 27 ALT was elevated at 71 and phosphatase was normal at 180 transferrin saturation was normal at 15% creatinine was normal at 0.6  Transglutaminase IgA antibody was negative on June 22, 2017  Creatinine IgA antibody was pending. On June 22, 2017 endomysial IgA antibody was negative on June 22, 2017  White blood cell was 8.6 platelets were 423 on June 22, 2017  IgG total was 813 which is normal on June 22, 2017 IgA total was 130 which is normal and June 22, 2017  Ceruloplasmin was normal at 35.2 on June 22, 2017 and alpha-1 antitrypsin in the serum was 142 which is normal on June 22, 2017    TTG antibody was negative on  June 22, 2017  IgG subclass  IgG 2 subclass was normal at 311, IgG4 subclass was normal at 8, IgG1 subclass was normal at 676 and IgG 3 subclass was slightly elevated at 122 ()  Ferritin was normal at 43 on June 22, 2017 creatinine IgG antibody was negative on June 22, 2017. On May 6, 2017 AST was 31 and ALT was normal at 54    Medical Decision Making:  Today's Assessment / Status / Plan:     Skin ulcerations  She has developed possible new lesions.  She has responded to prednisone in the past.  We will provide short trial course.  Patient is to MyChart me with feedback.  If she worsens will return back to prednisone 5mg.    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 CT of the right  hand noted soft tissue swelling and concern for cellulitis.   MRI of the hand did not show synovitis.   Our challenge has been her unexplained recurrent transaminitis. For now continue plaquenil     At this point despite mild immunosuppression she is developing new lesions.  In addition we have not been able to start a medication without affecting her liver.  Will see if [facility] evaluation would be beneficial.    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  The left hip showed a T score of -1.5 with a bone mineral density of 0.823 g/cm²    1. Soft tissue swelling  RHEUMATOID ARTHRITIS FACTOR    WESTERGREN SED RATE    CRP QUANTITIVE (NON-CARDIAC)    HEPATIC FUNCTION PANEL    CBC WITH DIFFERENTIAL    CREATININE    BUN   2. Nasal septal perforation     3. Skin ulcer, unspecified ulcer  stage (HCC)       No Follow-up on file.      She agreed and verbalized her agreement and understanding with the current plan. I answered all questions and concerns she has at this time and advised her to call at any time in there interim with questions or concerns in regards to her care.    Thank you for allowing me to participate in her care, I will continue to follow closely.

## 2018-08-01 NOTE — LETTER
[facility]   [address], [address]  [place] [address]  Phone: [phone]  Fax: [phone]              Encounter Date: 8/1/2018    Dear Dr. Florence ref. provider found,    It was a pleasure seeing your patient, Enedelia Pang, on 8/1/2018. Diagnoses of Soft tissue swelling, Nasal septal perforation, and Skin ulcer, unspecified ulcer stage (HCC) were pertinent to this visit.     Please find attached progress note which includes the history I obtained from Ms. Pang, my physical examination findings, my impression and recommendations.      Once again, it was a pleasure participating in your patient's care.  Please feel free to contact me if you have any questions or if I can be of any further assistance to your patients.      Sincerely,    Aimee Andrade M.D.  Electronically Signed          PROGRESS NOTE:  Subjective:   Date of Consultation:8/1/2018  6:46 AM  Primary care physician:Elliott Power M.D.    Reason for Consultation:  Ms. Pang  is a pleasant 46 y.o. year old female who presented with follow-up seronegative Rheumatoid arthritis    Seronegative Rheumatoid Arthritis, non erosive  At last visit we continue prenidosne 5 mg daily and twice a week she needs 10 mg twice a week.  Still on plaquenil 300 mg q 6 months 3/28/2018 noted no macular toxicity  MRI did not show synovitis but had soft tissue swelling.  The swelling in her hand is responsive to prednisone  New skin lesion on the right hand dorsal aspect    [facility] slide review, per patient, were inconclusive      Migraines  Continue depakote      RHEUM HISTORY:   Ms. Enedelia Pang presented in January 2016 with joint pain and swelling of hands,knees, and weakness and a 3 month history of septal perforation.  ENT initial evaluation noted no disease activity.  She also has a history of abnormal LFT with unclear etiology.  She has had a work-up with GI in early 2017 which was unrevealing.  Also she has had a  recurrent rash described as papules that ulcerate identified as MRSA infections.   Her labs did show thrombocytosis which could be inflammatory and anemia which was determined to be iron deficiency anemia.  Her rheumatologic work-up showed RF(-), MG (-), ANCA (-), SCL 70 (-) and anti centromere (-)  Parvovirus and rubella IgM was negative    Hand xrays did show periarticular osteopenia.    1/9/2017  Hepatitis B surface antigen negative  Hepatitis B core antibody negative  Hepatitis C antibody negative  quantiferon negative   HIV negative     CXR 12/6/2017 was negative for edema     11/2016 Nasal biopsy did not show active disease    Previous Medication:  Sulfasalazine 1000 mg BID (off)  Methotrexate 5 tabs weekly q Thursday (off)  Folic acid  Off prednisone    Epistaxis with nasal perforation  Perforation was stable  Did not make ENT appointment due to hospitalization for seizures.    Elevated liver enzymes  Off methotrexate due to elevated LFT.    Stable  She has had a liver biopsy 7/2017.  The liver biopsy suggests it is due to methotrexate toxicity however her elevated LFT had been ongoing prior to start of methotrexate  LFT have been normal.     Chronic diarrhea  On and off diarrhea  (not addressed today)    Renal cysts  Bilateral  Plan to see nephrology    History of retinal disease  She did see ophthalmology     Past Medical History:  Pseudotumor cerebri, MRSA infection history, CVA, miraines, pituatary tumor, migraine headaches, septal perforation. No history of blood clots    Past Medical History:   Diagnosis Date   • Allergy, unspecified not elsewhere classified    • Anemia 10/05/2017    Unsure if a current issue.   • Anxiety    • autoimmune    • Depression    • H/O Clostridium difficile infection    • Hx MRSA infection    • Hydrocephalus     with lumbar shunt   • Migraine with aura, with intractable migraine, so stated, without mention of status migrainosus    • MRSA (methicillin resistant Staphylococcus  "aureus)    • Pituitary abnormality (HCC)    • Staph infection    • Stroke (HCC)     2007     Past Surgical History:   Procedure Laterality Date   •  SHUNT INSERTION  5/31/2017    Procedure:  SHUNT INSERTION;  Surgeon: Drew Berry M.D.;  Location: SURGERY [facility];  Service:    • SPINAL CORD STIMULATOR  12/19/2016    Procedure: SPINAL CORD STIMULATOR FOR: PLACEMENT OF LEFT FLANK OCCIPITAL NERVE STIMULATOR BATTERY PACK;  Surgeon: Oli Oh III, M.D.;  Location: SURGERY [facility];  Service:    • LUMBAR EXPLORATION N/A 8/31/2015    Procedure: LUMBAR EXPLORATION- Lumbar shunt removal ;  Surgeon: Oli Oh III, M.D.;  Location: SURGERY [facility];  Service:    • IRRIGATION & DEBRIDEMENT NEURO N/A 6/28/2015    Procedure: IRRIGATION & DEBRIDEMENT NEURO;  Surgeon: Oli Oh III, M.D.;  Location: SURGERY [facility];  Service:    • APPENDECTOMY     • CHOLECYSTECTOMY     • OTHER      right knee surgery   • OTHER NEUROLOGICAL SURG      occipital nerve stimulator   • TONSILLECTOMY     • TUBAL LIGATION       Allergies   Allergen Reactions   • Sumatriptan Unspecified     Historical=\"Heart stops.\" Reaction  between 1995 to 1997   • Prochlorperazine Swelling     Tongue swelling. Reaction as a teen. (compazine)  Tolerated Phenergan on 02/24/15   • Vancomycin Shortness of Breath and Rash     Reaction in 2005.  D/W patient 8/31/15 - tolerated loading dose of vancomycin administered on 8/30/15 with some itching to chest with decreased infusion rate. Red Man Syndrome     Outpatient Encounter Prescriptions as of 8/1/2018   Medication Sig Dispense Refill   • levetiracetam (KEPPRA) 1000 MG tablet Take  by mouth.     • predniSONE (DELTASONE) 5 MG Tab 20 mg po q day x 5 days then decrease every 5 day by 5 mg 50 Tab 0   • predniSONE (DELTASONE) 5 MG Tab 5 mg po q day may increase to 10 mg  Po q day if needed 115 Tab 0   • risperiDONE (RISPERDAL) 1 MG Tab Take 1 mg by mouth 2 times a day.     • " hydroxychloroquine (PLAQUENIL) 200 MG Tab Take 1.5 Tabs by mouth every day. 45 Tab 1   • diphenhydrAMINE (BENADRYL) 50 MG/ML Solution 50 mg by Intramuscular route every 6 hours as needed. With Toradol for migraines     • ketorolac (TORADOL) 60 MG/2ML Solution 60 mg by Intramuscular route every 6 hours as needed. With Benadryl for migraines     • predniSONE (DELTASONE) 5 MG Tab Take 5 mg by mouth every morning.     • Calcium Citrate-Vitamin D (CALCIUM + D PO) Take 1 Tab by mouth every morning.     • aspirin (ASA) 81 MG Chew Tab chewable tablet Take 81 mg by mouth every morning.     • omeprazole (PRILOSEC) 20 MG delayed-release capsule Take 1 Cap by mouth every day. 30 Cap 2   • gabapentin (NEURONTIN) 600 MG tablet Take 600 mg by mouth 3 times a day.     • duloxetine (CYMBALTA) 60 MG CPEP delayed-release capsule Take 60 mg by mouth 2 times a day.       No facility-administered encounter medications on file as of 8/1/2018.        Social History     Social History   • Marital status:      Spouse name: N/A   • Number of children: N/A   • Years of education: N/A     Occupational History   •       on disability     Social History Main Topics   • Smoking status: Never Smoker   • Smokeless tobacco: Never Used   • Alcohol use Yes      Comment: Rare   • Drug use: No   • Sexual activity: Not on file     Other Topics Concern   • Not on file     Social History Narrative   • No narrative on file      History   Smoking Status   • Never Smoker   Smokeless Tobacco   • Never Used     History   Alcohol Use   • Yes     Comment: Rare     History   Drug Use No      OB History   No data available      No LMP recorded.    G P A L     Family History   Problem Relation Age of Onset   • No Known Problems Mother    • No Known Problems Father        Review of Systems   Constitutional: Negative for chills and fever.   Respiratory: Negative for cough and hemoptysis.    Gastrointestinal: Negative for abdominal pain.      Musculoskeletal: Positive for joint pain.        Hand swelling and joint pain   Skin: Positive for rash.                  Objective:   /60   Pulse 97   Temp 36.1 °C (96.9 °F)   Resp 16   Wt 76.2 kg (168 lb)   SpO2 94%   BMI 29.76 kg/m²     Vitals:    08/01/18 0800   BP: 102/60   Pulse: 97   Resp: 16   Temp: 36.1 °C (96.9 °F)   SpO2: 94%   Weight: 76.2 kg (168 lb)         Physical Exam   Constitutional: She is oriented to person, place, and time. She appears well-developed and well-nourished. No distress.   HENT:   Head: Normocephalic and atraumatic.   Right Ear: External ear normal.   Left Ear: External ear normal.   Eyes: Pupils are equal, round, and reactive to light. Conjunctivae are normal. Right eye exhibits no discharge. Left eye exhibits no discharge. No scleral icterus.   Cardiovascular: Normal rate, regular rhythm and normal heart sounds.    Pulmonary/Chest: Effort normal. No stridor. No respiratory distress.   Musculoskeletal: She exhibits no edema.   Today the left hand remains swollen with soft tissue swelling.  The left hand 4th digit notes swelling. Good ROM of her wrists bilateral flexion and extension.   No periarticular swelling of the MCP, PIP of the right hand. Good ROM flexion and extension of elbows bilateral.    Lymphadenopathy:     She has no cervical adenopathy.   Neurological: She is alert and oriented to person, place, and time.   Skin: Skin is warm and dry. She is not diaphoretic.   No alopecia.  On the dorsum of the hand small lesions.  Also on the foot a healing lesion on the dorsal aspect.   Psychiatric: She has a normal mood and affect. Her behavior is normal. Judgment and thought content normal.       Assessment:     1. Soft tissue swelling  RHEUMATOID ARTHRITIS FACTOR    WESTERGREN SED RATE    CRP QUANTITIVE (NON-CARDIAC)    HEPATIC FUNCTION PANEL    CBC WITH DIFFERENTIAL    CREATININE    BUN   2. Nasal septal perforation     3. Skin ulcer, unspecified ulcer stage (HCC)        Labs:    Lab Results   Component Value Date/Time    QNTTBGOLD Negative 01/09/2017 06:01 PM     Lab Results   Component Value Date/Time    HEPBCORTOT Negative 04/12/2018 03:14 AM    HEPBCORIGM Negative 05/21/2017 02:07 AM    HEPBSAG Negative 04/12/2018 03:14 AM     Lab Results   Component Value Date/Time    HEPCAB Negative 04/12/2018 03:14 AM     Lab Results   Component Value Date/Time    SODIUM 141 06/19/2018 02:45 AM    POTASSIUM 4.0 06/19/2018 02:45 AM    CHLORIDE 105 06/19/2018 02:45 AM    CO2 28 06/19/2018 02:45 AM    GLUCOSE 90 06/19/2018 02:45 AM    BUN 8 06/19/2018 02:45 AM    CREATININE 0.56 06/19/2018 02:45 AM      Lab Results   Component Value Date/Time    WBC 7.2 06/19/2018 02:45 AM    RBC 3.82 (L) 06/19/2018 02:45 AM    HEMOGLOBIN 12.0 06/19/2018 02:45 AM    HEMATOCRIT 36.8 (L) 06/19/2018 02:45 AM    MCV 96.3 06/19/2018 02:45 AM    MCH 31.4 06/19/2018 02:45 AM    MCHC 32.6 (L) 06/19/2018 02:45 AM    MPV 9.9 06/19/2018 02:45 AM    NEUTSPOLYS 68.40 06/13/2018 07:52 PM    LYMPHOCYTES 23.70 06/13/2018 07:52 PM    MONOCYTES 6.10 06/13/2018 07:52 PM    EOSINOPHILS 0.30 06/13/2018 07:52 PM    BASOPHILS 0.90 06/13/2018 07:52 PM    HYPOCHROMIA 1+ 01/11/2017 02:29 PM    ANISOCYTOSIS 1+ 01/11/2017 02:29 PM      Lab Results   Component Value Date/Time    CALCIUM 8.9 06/19/2018 02:45 AM    ASTSGOT 13 06/15/2018 03:00 AM    ALTSGPT 18 06/15/2018 03:00 AM    ALKPHOSPHAT 66 06/15/2018 03:00 AM    TBILIRUBIN 0.4 06/15/2018 03:00 AM    ALBUMIN 4.0 06/15/2018 03:00 AM    ALBUMIN 4.69 11/06/2017 10:23 AM    TOTPROTEIN 6.4 06/15/2018 03:00 AM    TOTPROTEIN 7.50 11/06/2017 10:23 AM     Lab Results   Component Value Date/Time    URICACID 4.3 09/15/2017 03:45 PM    RHEUMFACTN <10 10/14/2017 07:40 PM    ANTINUCAB None Detected 01/06/2017 04:12 AM     Lab Results   Component Value Date/Time    SEDRATEWES 19 04/12/2018 03:14 AM    CREACTPROT 0.41 04/12/2018 03:14 AM     Lab Results   Component Value Date/Time    EMMANUEL  40.3 09/15/2017 03:45 PM    DRVVTINTERP Not Present 09/15/2017 03:45 PM     Lab Results   Component Value Date/Time    V2EMUTMQMQM 178.0 01/06/2017 04:12 AM    D3LMBSCVRMC 37.0 01/06/2017 04:12 AM    IGGCARDIOLI 5 09/15/2017 03:45 PM    IGMCARDIOLI 6 09/15/2017 03:45 PM    IGACARDIOLI 2 09/15/2017 03:45 PM    CRYOGLOBULIN NEG 72Hour 04/17/2017 03:53 PM     Lab Results   Component Value Date/Time    WANKEKQ82 0 01/06/2017 04:12 AM    CENTROMBAB 2 01/06/2017 04:12 AM     Lab Results   Component Value Date/Time    ANCAIGG <1:20 01/06/2017 04:12 AM    Y8TPKEUZKNO 178.0 01/06/2017 04:12 AM    ANTISSBSJ 0 11/18/2017 04:30 AM     Lab Results   Component Value Date/Time    COLORURINE Yellow 06/14/2018 03:10 AM    SPECGRAVITY 1.007 06/14/2018 03:10 AM    PHURINE 7.0 06/14/2018 03:10 AM    GLUCOSEUR Negative 06/14/2018 03:10 AM    KETONES Negative 06/14/2018 03:10 AM    PROTEINURIN Negative 06/14/2018 03:10 AM     No results found for: TOTPROTUR, TOTALVOLUME, [id number]  Lab Results   Component Value Date/Time    SSA60 14 11/18/2017 04:30 AM    SSA52 0 11/18/2017 04:30 AM     Lab Results   Component Value Date/Time    HBA1C 5.0 11/16/2017 11:12 PM     Lab Results   Component Value Date/Time    CPKTOTAL 32 11/17/2017 04:43 AM     Lab Results   Component Value Date/Time    G6PD 20.4 (H) 01/10/2017 01:54 PM     Lab Results   Component Value Date/Time    LAXM19AYJP Negative 10/14/2017 07:40 PM     No results found for: ACESERUM  Lab Results   Component Value Date/Time    25HYDROXY 62 02/22/2018 09:36 AM     No results found for: TSH, FREEDIR  Lab Results   Component Value Date/Time    TSHULTRASEN 4.600 05/28/2018 08:57 AM    FREET4 0.78 11/20/2017 03:00 AM     Lab Results   Component Value Date/Time    MICROSOMALA 27.3 (H) 02/22/2018 09:36 AM    ANTITHYROGL <0.9 02/22/2018 09:36 AM     No results found for: IGGLYMEABS  Lab Results   Component Value Date/Time    ANTIMITOCHO 8.5 04/12/2018 03:14 AM    FACTIN 6 04/12/2018 03:14 AM        Lab Results   Component Value Date/Time     06/15/2018 03:00 AM    TTRANSIGA 0 06/15/2018 03:00 AM     No results found for: FLTYPE, CRYSTALSBDF  No results found for: ISTATICAL  No results found for: ISTATCREAT  No results found for: CTELOPEP  No results found for: GBMABG  No results found for: PTHINTACT  1/27/2015 hepatitis E IgM    1/27/2015 ceruloplasmin = 29.7  1/27/2015 smith antibody = 9 (normal)  1/27/2015 MG negative  1/27/2015 alpha antitrypsin 119 ()  1/27/2015 ferritin = 75 (normal)  1/27/2015 GGT = 327 (0-55)    1/27/2015 IgG 4 = 11  10/31/2016 and 1/27/2015  p ANCA (-), c ANCA (-), PR3 (-)  10/31/2016 MPO (-)  12/7/2014 AST = 23 ALT = 137 Alk phos = 205  6/10/2016 ALT = 921 AST = 821 Alk phos = 537  Labs from JUne 2016 shows low albumin 2.7 and AST 92 and ALT of 370    6/11/2016; AST=92, ALT  = 370 Alk Phos = 331 Hgb = 8.6 MCV = 74.7 t bili = 1.2  10/15/2015: Hgb = 9.1 Plt =   ALT= 357 AST =127 Alk phos = 469 creatinine = 0.7    US LE venous doppler bilateral 6/30/2014  Probable baker's cyst on left lower extremity    Left hand XR 8/14/2014  Soft tissue swelling over the dorsum.  No erorsons no fractures     CT abdomen/Pelvis w/o Contrast 9/4/2014  Small amount of fluid in cul-de-sac no abnormalities in liver.  Abdominal aorta was normal.  Adrenal glands normal.  coritical medullatry calcifications noted of left kidney    CT head w w/o contrast 10/15/2016  No acute abnormalities and on CT maxillofacial w/ contrast - no evidence of sinusitis    CTchest w/con 1/12/2016  No findings to suggest aortic dissection or pulmonary embolus  Left renal 18 mm cyst in the ventral cortex medially    CT spine lumbar w/o contrast  8/30/2015  Small fluid  Collection posterior junction of the epidural catheter seen right lateral to the L2 posterior spinous process consistent with leakage and/or infection      Blood work performed at outside labs dated May 6, 2017 white blood cell count of 7 total  protein is 7.8 albumin is 4.1 monocytes 1010.7 neutrophils were normal at 5.33 and lymphocytes were normal at 9 point correction 0.94 platelets were 233     Labs from June 22, 2017 showed a hemoglobin of 14.3 AST was normalized at 27 ALT was elevated at 71 and phosphatase was normal at 180 transferrin saturation was normal at 15% creatinine was normal at 0.6  Transglutaminase IgA antibody was negative on June 22, 2017  Creatinine IgA antibody was pending. On June 22, 2017 endomysial IgA antibody was negative on June 22, 2017  White blood cell was 8.6 platelets were 423 on June 22, 2017  IgG total was 813 which is normal on June 22, 2017 IgA total was 130 which is normal and June 22, 2017  Ceruloplasmin was normal at 35.2 on June 22, 2017 and alpha-1 antitrypsin in the serum was 142 which is normal on June 22, 2017    TTG antibody was negative on June 22, 2017  IgG subclass  IgG 2 subclass was normal at 311, IgG4 subclass was normal at 8, IgG1 subclass was normal at 676 and IgG 3 subclass was slightly elevated at 122 ()  Ferritin was normal at 43 on June 22, 2017 creatinine IgG antibody was negative on June 22, 2017. On May 6, 2017 AST was 31 and ALT was normal at 54    Medical Decision Making:  Today's Assessment / Status / Plan:     Skin ulcerations  She has developed possible new lesions.  She has responded to prednisone in the past.  We will provide short trial course.  Patient is to MyChart me with feedback.  If she worsens will return back to prednisone 5mg.    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 CT of the right  hand noted soft tissue swelling and concern for cellulitis.   MRI of the hand did not show synovitis.   Our challenge has been her unexplained recurrent transaminitis. For now continue plaquenil     At this point despite mild immunosuppression she is developing new lesions.  In addition we have not been able to start a medication without affecting her liver.   Will see if [facility] evaluation would be beneficial.    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  The left hip showed a T score of -1.5 with a bone mineral density of 0.823 g/cm²    1. Soft tissue swelling  RHEUMATOID ARTHRITIS FACTOR    WESTERGREN SED RATE    CRP QUANTITIVE (NON-CARDIAC)    HEPATIC FUNCTION PANEL    CBC WITH DIFFERENTIAL    CREATININE    BUN   2. Nasal septal perforation     3. Skin ulcer, unspecified ulcer stage (HCC)       No Follow-up on file.      She agreed and verbalized her agreement and understanding with the current plan. I answered all questions and concerns she has at this time and advised her to call at any time in there interim with questions or concerns in regards to her care.    Thank you for allowing me to participate in her care, I will continue to follow closely.

## 2018-08-08 ENCOUNTER — TELEPHONE (OUTPATIENT)
Dept: NEUROLOGY | Facility: MEDICAL CENTER | Age: 48
End: 2018-08-08

## 2018-08-08 NOTE — TELEPHONE ENCOUNTER
Called pt and she answered. I let her know that [facility] does not prescribe the Toradol injection and that she can ask her primary care provider to fill it for her. This is regarding the refill request the pharmacy sent over.

## 2018-08-30 NOTE — DISCHARGE PLANNING
HPI     Diabetic Eye Exam      Additional comments: photo               Comments     70 y.o. y/o here for screening for Diabetic Renopathy with non-dilated   fundus photos per Juan Antonio Ye MD            Last edited by Nilda Mckee on 8/30/2018  9:43 AM. (History)            Assessment /Plan     For exam results, see Encounter Report.                      Met with pt at bedside. IM delivered, placed in pt chart.

## 2018-09-10 ENCOUNTER — TELEPHONE (OUTPATIENT)
Dept: RHEUMATOLOGY | Facility: PHYSICIAN GROUP | Age: 48
End: 2018-09-10

## 2018-09-10 RX ORDER — HYDROXYCHLOROQUINE SULFATE 200 MG/1
TABLET, FILM COATED ORAL
Qty: 45 TAB | Refills: 4 | Status: SHIPPED | OUTPATIENT
Start: 2018-09-10 | End: 2018-10-11 | Stop reason: SDUPTHER

## 2018-09-10 NOTE — TELEPHONE ENCOUNTER
Called [facility] [place] to make referral.  [facility] does not accept Medicare advantage plan.    [facility] [place] # [phone]

## 2018-10-11 ENCOUNTER — OFFICE VISIT (OUTPATIENT)
Dept: RHEUMATOLOGY | Facility: MEDICAL CENTER | Age: 48
End: 2018-10-11

## 2018-10-11 VITALS
WEIGHT: 173.2 LBS | DIASTOLIC BLOOD PRESSURE: 78 MMHG | SYSTOLIC BLOOD PRESSURE: 104 MMHG | HEART RATE: 94 BPM | OXYGEN SATURATION: 94 % | TEMPERATURE: 97.2 F | BODY MASS INDEX: 30.68 KG/M2

## 2018-10-11 DIAGNOSIS — M25.511 RIGHT SHOULDER PAIN, UNSPECIFIED CHRONICITY: ICD-10-CM

## 2018-10-11 DIAGNOSIS — Z79.899 LONG-TERM USE OF HYDROXYCHLOROQUINE: ICD-10-CM

## 2018-10-11 PROCEDURE — 99213 OFFICE O/P EST LOW 20 MIN: CPT | Performed by: INTERNAL MEDICINE

## 2018-10-11 RX ORDER — PREDNISONE 5 MG/1
TABLET ORAL
Qty: 125 TAB | Refills: 0 | Status: SHIPPED | OUTPATIENT
Start: 2018-10-11 | End: 2019-05-27

## 2018-10-11 RX ORDER — BUPRENORPHINE 5 UG/H
PATCH TRANSDERMAL
COMMUNITY
End: 2019-05-27

## 2018-10-11 RX ORDER — HYDROXYCHLOROQUINE SULFATE 200 MG/1
TABLET, FILM COATED ORAL
Qty: 135 TAB | Refills: 0 | Status: SHIPPED | OUTPATIENT
Start: 2018-10-11 | End: 2019-05-27

## 2018-10-11 ASSESSMENT — ENCOUNTER SYMPTOMS
HEMOPTYSIS: 0
COUGH: 0
ABDOMINAL PAIN: 0
CHILLS: 0
FEVER: 0

## 2018-10-11 NOTE — PROGRESS NOTES
Subjective:   Date of Consultation:10/11/2018  7:24 AM  Primary care physician:Elliott Power M.D.    Reason for Consultation:  Ms. Pang  is a pleasant 46 y.o. year old female who presented with follow-up seronegative Rheumatoid arthritis    Seronegative Rheumatoid Arthritis, non erosive  Since last visit she has had another flare and was noted to that her pathology showed vasculitis.  I have asked her previously to provide the biopsy report.  She has not provided the biopsy report.  She also reports this was done at [facility].  I have referred her to [facility] but this has been declined.  THe review of records, that did not include this most recent biopsy, noted they did not fee there was more to add to the care.      She has been increased to prednisone 15 mg po q day.  Her lesions are improved.    Still on plaquenil 300 mg q day since 3/28/2018 noted no macular toxicity  MRI did not show synovitis but had soft tissue swelling.  The swelling in her hand is responsive to prednisone    She still has not followed up with ENT.  preiovus biopsy report shows that [facility] slide review, per patient, were inconclusive      RHEUM HISTORY:   Ms. Enedelia Pang presented in January 2016 with joint pain and swelling of hands,knees, and weakness and a 3 month history of septal perforation.  ENT initial evaluation noted no disease activity.  She also has a history of abnormal LFT with unclear etiology.  She has had a work-up with GI in early 2017 which was unrevealing.  Also she has had a recurrent rash described as papules that ulcerate identified as MRSA infections.   Her labs did show thrombocytosis which could be inflammatory and anemia which was determined to be iron deficiency anemia.  Her rheumatologic work-up showed RF(-), MG (-), ANCA (-), SCL 70 (-) and anti centromere (-)  Parvovirus and rubella IgM was negative    Hand xrays did show periarticular osteopenia.    1/9/2017  Hepatitis B  surface antigen negative  Hepatitis B core antibody negative  Hepatitis C antibody negative  quantiferon negative   HIV negative     CXR 12/6/2017 was negative for edema     11/2016 Nasal biopsy did not show active disease    Previous Medication:  Sulfasalazine 1000 mg BID (off)  Methotrexate 5 tabs weekly q Thursday (off)  Folic acid  Off prednisone    Epistaxis with nasal perforation  Perforation was stable  Did not make ENT appointment due to hospitalization for seizures.    Elevated liver enzymes  Off methotrexate due to elevated LFT.    Stable  She has had a liver biopsy 7/2017.  The liver biopsy suggests it is due to methotrexate toxicity however her elevated LFT had been ongoing prior to start of methotrexate  LFT have been normal.     Chronic diarrhea  On and off diarrhea  (not addressed today)    Renal cysts  Bilateral  Plan to see nephrology    History of retinal disease  She did see ophthalmology     Past Medical History:  Pseudotumor cerebri, MRSA infection history, CVA, miraines, pituatary tumor, migraine headaches, septal perforation. No history of blood clots    Past Medical History:   Diagnosis Date   • Allergy, unspecified not elsewhere classified    • Anemia 10/05/2017    Unsure if a current issue.   • Anxiety    • autoimmune    • Depression    • H/O Clostridium difficile infection    • Hx MRSA infection    • Hydrocephalus     with lumbar shunt   • Migraine with aura, with intractable migraine, so stated, without mention of status migrainosus    • MRSA (methicillin resistant Staphylococcus aureus)    • Pituitary abnormality (HCC)    • Staph infection    • Stroke (HCC)     2007     Past Surgical History:   Procedure Laterality Date   •  SHUNT INSERTION  5/31/2017    Procedure:  SHUNT INSERTION;  Surgeon: Drew Berry M.D.;  Location: SURGERY [facility];  Service:    • SPINAL CORD STIMULATOR  12/19/2016    Procedure: SPINAL CORD STIMULATOR FOR: PLACEMENT OF LEFT FLANK OCCIPITAL NERVE  "STIMULATOR BATTERY PACK;  Surgeon: Oli Oh III, M.D.;  Location: SURGERY [facility];  Service:    • LUMBAR EXPLORATION N/A 8/31/2015    Procedure: LUMBAR EXPLORATION- Lumbar shunt removal ;  Surgeon: Oli Oh III, M.D.;  Location: SURGERY [facility];  Service:    • IRRIGATION & DEBRIDEMENT NEURO N/A 6/28/2015    Procedure: IRRIGATION & DEBRIDEMENT NEURO;  Surgeon: Oli Oh III, M.D.;  Location: SURGERY [facility];  Service:    • APPENDECTOMY     • CHOLECYSTECTOMY     • OTHER      right knee surgery   • OTHER NEUROLOGICAL SURG      occipital nerve stimulator   • TONSILLECTOMY     • TUBAL LIGATION       Allergies   Allergen Reactions   • Sumatriptan Unspecified     Historical=\"Heart stops.\" Reaction  between 1995 to 1997   • Prochlorperazine Swelling     Tongue swelling. Reaction as a teen. (compazine)  Tolerated Phenergan on 02/24/15   • Vancomycin Shortness of Breath and Rash     Reaction in 2005.  D/W patient 8/31/15 - tolerated loading dose of vancomycin administered on 8/30/15 with some itching to chest with decreased infusion rate. Red Man Syndrome     Outpatient Encounter Prescriptions as of 10/11/2018   Medication Sig Dispense Refill   • Buprenorphine 5 MCG/HR PATCH WEEKLY buprenorphine 5 mcg/hour weekly transdermal patch   Apply 1 patch every week by transdermal route as directed for 30 days.     • hydroxychloroquine (PLAQUENIL) 200 MG Tab Alternate 200 mg and 400 mg daily po 135 Tab 0   • predniSONE (DELTASONE) 5 MG Tab 30 mg po q day x 5 days and decrease every 5 days by 5 mg back to the current dose of 15 mg 125 Tab 0   • risperiDONE (RISPERDAL) 1 MG Tab Take 1 mg by mouth 2 times a day.     • diphenhydrAMINE (BENADRYL) 50 MG/ML Solution 50 mg by Intramuscular route every 6 hours as needed. With Toradol for migraines     • ketorolac (TORADOL) 60 MG/2ML Solution 60 mg by Intramuscular route every 6 hours as needed. With Benadryl for migraines     • Calcium Citrate-Vitamin D " (CALCIUM + D PO) Take 1 Tab by mouth every morning.     • aspirin (ASA) 81 MG Chew Tab chewable tablet Take 81 mg by mouth every morning.     • omeprazole (PRILOSEC) 20 MG delayed-release capsule Take 1 Cap by mouth every day. 30 Cap 2   • gabapentin (NEURONTIN) 600 MG tablet Take 600 mg by mouth 3 times a day.     • duloxetine (CYMBALTA) 60 MG CPEP delayed-release capsule Take 60 mg by mouth 2 times a day.     • [DISCONTINUED] predniSONE (DELTASONE) 5 MG Tab TAKE 1 TABLET EVERY DAY, MAY INCREASE TO 10MG DAILY IF NEEDED 115 Tab 0   • [DISCONTINUED] hydroxychloroquine (PLAQUENIL) 200 MG Tab Alternate 200 mg and 400 mg daily po 45 Tab 4   • levetiracetam (KEPPRA) 1000 MG tablet Take  by mouth.     • [DISCONTINUED] predniSONE (DELTASONE) 5 MG Tab 20 mg po q day x 5 days then decrease every 5 day by 5 mg 50 Tab 0   • [DISCONTINUED] predniSONE (DELTASONE) 5 MG Tab 5 mg po q day may increase to 10 mg  Po q day if needed 115 Tab 0   • [DISCONTINUED] predniSONE (DELTASONE) 5 MG Tab Take 5 mg by mouth every morning.       No facility-administered encounter medications on file as of 10/11/2018.        Social History     Social History   • Marital status:      Spouse name: N/A   • Number of children: N/A   • Years of education: N/A     Occupational History   •       on disability     Social History Main Topics   • Smoking status: Never Smoker   • Smokeless tobacco: Never Used   • Alcohol use Yes      Comment: Rare   • Drug use: No   • Sexual activity: Not on file     Other Topics Concern   • Not on file     Social History Narrative   • No narrative on file      History   Smoking Status   • Never Smoker   Smokeless Tobacco   • Never Used     History   Alcohol Use   • Yes     Comment: Rare     History   Drug Use No      OB History   No data available      No LMP recorded.    G P A L     Family History   Problem Relation Age of Onset   • No Known Problems Mother    • No Known Problems Father        Review of  Systems   Constitutional: Negative for chills and fever.   Respiratory: Negative for cough and hemoptysis.    Gastrointestinal: Negative for abdominal pain.   Musculoskeletal: Positive for joint pain.        Hand swelling and joint pain   Skin: Positive for rash.                  Objective:   /78 (BP Location: Left arm, Patient Position: Sitting, BP Cuff Size: Adult)   Pulse 94   Temp 36.2 °C (97.2 °F) (Temporal)   Wt 78.6 kg (173 lb 3.2 oz)   SpO2 94%   BMI 30.68 kg/m²    Vitals:    10/11/18 0746   BP: 104/78   BP Location: Left arm   Patient Position: Sitting   BP Cuff Size: Adult   Pulse: 94   Temp: 36.2 °C (97.2 °F)   TempSrc: Temporal   SpO2: 94%   Weight: 78.6 kg (173 lb 3.2 oz)         Physical Exam   Constitutional: She is oriented to person, place, and time. She appears well-developed and well-nourished. No distress.   HENT:   Head: Normocephalic and atraumatic.   Right Ear: External ear normal.   Left Ear: External ear normal.   Eyes: Pupils are equal, round, and reactive to light. Conjunctivae are normal. Right eye exhibits no discharge. Left eye exhibits no discharge. No scleral icterus.   Cardiovascular: Normal rate, regular rhythm and normal heart sounds.    Pulmonary/Chest: Effort normal. No stridor. No respiratory distress.   Musculoskeletal: She exhibits no edema.   Right shoulder tendern to palpation with preserved ROM.  Good ROM of her wrists bilateral flexion and extension.   No periarticular swelling of the MCP, PIP of the right hand. Good ROM flexion and extension of elbows bilateral.    Lymphadenopathy:     She has no cervical adenopathy.   Neurological: She is alert and oriented to person, place, and time.   Skin: Skin is warm and dry. She is not diaphoretic.   No alopecia.  On the dorsum of the hand small lesions with ulceration. Also on the foot a healing lesion on the dorsal aspect.   Psychiatric: She has a normal mood and affect. Her behavior is normal. Judgment and thought  content normal.   Vitals reviewed.      Assessment:     1. Long-term use of hydroxychloroquine  CBC WITH DIFFERENTIAL    COMP METABOLIC PANEL    CRP QUANTITIVE (NON-CARDIAC)    WESTERGREN SED RATE   2. Right shoulder pain, unspecified chronicity  DX-SHOULDER 2+ RIGHT     Labs:    Lab Results   Component Value Date/Time    QNTTBGOLD Negative 01/09/2017 06:01 PM     Lab Results   Component Value Date/Time    HEPBCORTOT Negative 04/12/2018 03:14 AM    HEPBCORIGM Negative 05/21/2017 02:07 AM    HEPBSAG Negative 04/12/2018 03:14 AM     Lab Results   Component Value Date/Time    HEPCAB Negative 04/12/2018 03:14 AM     Lab Results   Component Value Date/Time    SODIUM 141 06/19/2018 02:45 AM    POTASSIUM 4.0 06/19/2018 02:45 AM    CHLORIDE 105 06/19/2018 02:45 AM    CO2 28 06/19/2018 02:45 AM    GLUCOSE 90 06/19/2018 02:45 AM    BUN 8 06/19/2018 02:45 AM    CREATININE 0.56 06/19/2018 02:45 AM      Lab Results   Component Value Date/Time    WBC 7.2 06/19/2018 02:45 AM    RBC 3.82 (L) 06/19/2018 02:45 AM    HEMOGLOBIN 12.0 06/19/2018 02:45 AM    HEMATOCRIT 36.8 (L) 06/19/2018 02:45 AM    MCV 96.3 06/19/2018 02:45 AM    MCH 31.4 06/19/2018 02:45 AM    MCHC 32.6 (L) 06/19/2018 02:45 AM    MPV 9.9 06/19/2018 02:45 AM    NEUTSPOLYS 68.40 06/13/2018 07:52 PM    LYMPHOCYTES 23.70 06/13/2018 07:52 PM    MONOCYTES 6.10 06/13/2018 07:52 PM    EOSINOPHILS 0.30 06/13/2018 07:52 PM    BASOPHILS 0.90 06/13/2018 07:52 PM    HYPOCHROMIA 1+ 01/11/2017 02:29 PM    ANISOCYTOSIS 1+ 01/11/2017 02:29 PM      Lab Results   Component Value Date/Time    CALCIUM 8.9 06/19/2018 02:45 AM    ASTSGOT 13 06/15/2018 03:00 AM    ALTSGPT 18 06/15/2018 03:00 AM    ALKPHOSPHAT 66 06/15/2018 03:00 AM    TBILIRUBIN 0.4 06/15/2018 03:00 AM    ALBUMIN 4.0 06/15/2018 03:00 AM    ALBUMIN 4.69 11/06/2017 10:23 AM    TOTPROTEIN 6.4 06/15/2018 03:00 AM    TOTPROTEIN 7.50 11/06/2017 10:23 AM     Lab Results   Component Value Date/Time    URICACID 4.3 09/15/2017 03:45  PM    RHEUMFACTN <10 10/14/2017 07:40 PM    ANTINUCAB None Detected 01/06/2017 04:12 AM     Lab Results   Component Value Date/Time    SEDRATEWES 19 04/12/2018 03:14 AM    CREACTPROT 0.41 04/12/2018 03:14 AM     Lab Results   Component Value Date/Time    RUSSELVIPER 40.3 09/15/2017 03:45 PM    DRVVTINTERP Not Present 09/15/2017 03:45 PM     Lab Results   Component Value Date/Time    C1FUEKQRXJR 178.0 01/06/2017 04:12 AM    Z7ZUEHPXXEJ 37.0 01/06/2017 04:12 AM    IGGCARDIOLI 5 09/15/2017 03:45 PM    IGMCARDIOLI 6 09/15/2017 03:45 PM    IGACARDIOLI 2 09/15/2017 03:45 PM    CRYOGLOBULIN NEG 72Hour 04/17/2017 03:53 PM     Lab Results   Component Value Date/Time    NJEUJES71 0 01/06/2017 04:12 AM    CENTROMBAB 2 01/06/2017 04:12 AM     Lab Results   Component Value Date/Time    ANCAIGG <1:20 01/06/2017 04:12 AM    X6IXINVSHJW 178.0 01/06/2017 04:12 AM    ANTISSBSJ 0 11/18/2017 04:30 AM     Lab Results   Component Value Date/Time    COLORURINE Yellow 06/14/2018 03:10 AM    SPECGRAVITY 1.007 06/14/2018 03:10 AM    PHURINE 7.0 06/14/2018 03:10 AM    GLUCOSEUR Negative 06/14/2018 03:10 AM    KETONES Negative 06/14/2018 03:10 AM    PROTEINURIN Negative 06/14/2018 03:10 AM     No results found for: TOTPROTUR, TOTALVOLUME, VIURLCPX27  Lab Results   Component Value Date/Time    SSA60 14 11/18/2017 04:30 AM    SSA52 0 11/18/2017 04:30 AM     Lab Results   Component Value Date/Time    HBA1C 5.0 11/16/2017 11:12 PM     Lab Results   Component Value Date/Time    CPKTOTAL 32 11/17/2017 04:43 AM     Lab Results   Component Value Date/Time    G6PD 20.4 (H) 01/10/2017 01:54 PM     Lab Results   Component Value Date/Time    CGVM24OTJC Negative 10/14/2017 07:40 PM     No results found for: ACESERUM  Lab Results   Component Value Date/Time    25HYDROXY 62 02/22/2018 09:36 AM     No results found for: TSH, FREEDIR  Lab Results   Component Value Date/Time    TSHULTRASEN 4.600 05/28/2018 08:57 AM    FREET4 0.78 11/20/2017 03:00 AM     Lab  Results   Component Value Date/Time    MICROSOMALA 27.3 (H) 02/22/2018 09:36 AM    ANTITHYROGL <0.9 02/22/2018 09:36 AM     No results found for: IGGLYMEABS  Lab Results   Component Value Date/Time    ANTIMITOCHO 8.5 04/12/2018 03:14 AM    FACTIN 6 04/12/2018 03:14 AM     Lab Results   Component Value Date/Time     06/15/2018 03:00 AM    TTRANSIGA 0 06/15/2018 03:00 AM     No results found for: FLTYPE, CRYSTALSBDF  No results found for: ISTATICAL  No results found for: ISTATCREAT  No results found for: CTELOPEP  No results found for: GBMABG  No results found for: PTHINTACT  1/27/2015 hepatitis E IgM    1/27/2015 ceruloplasmin = 29.7  1/27/2015 smith antibody = 9 (normal)  1/27/2015 MG negative  1/27/2015 alpha antitrypsin 119 ()  1/27/2015 ferritin = 75 (normal)  1/27/2015 GGT = 327 (0-55)    1/27/2015 IgG 4 = 11  10/31/2016 and 1/27/2015  p ANCA (-), c ANCA (-), PR3 (-)  10/31/2016 MPO (-)  12/7/2014 AST = 23 ALT = 137 Alk phos = 205  6/10/2016 ALT = 921 AST = 821 Alk phos = 537  Labs from JUne 2016 shows low albumin 2.7 and AST 92 and ALT of 370    6/11/2016; AST=92, ALT  = 370 Alk Phos = 331 Hgb = 8.6 MCV = 74.7 t bili = 1.2  10/15/2015: Hgb = 9.1 Plt =   ALT= 357 AST =127 Alk phos = 469 creatinine = 0.7    US LE venous doppler bilateral 6/30/2014  Probable baker's cyst on left lower extremity    Left hand XR 8/14/2014  Soft tissue swelling over the dorsum.  No erorsons no fractures     CT abdomen/Pelvis w/o Contrast 9/4/2014  Small amount of fluid in cul-de-sac no abnormalities in liver.  Abdominal aorta was normal.  Adrenal glands normal.  coritical medullatry calcifications noted of left kidney    CT head w w/o contrast 10/15/2016  No acute abnormalities and on CT maxillofacial w/ contrast - no evidence of sinusitis    CTchest w/con 1/12/2016  No findings to suggest aortic dissection or pulmonary embolus  Left renal 18 mm cyst in the ventral cortex medially    CT spine lumbar w/o contrast   8/30/2015  Small fluid  Collection posterior junction of the epidural catheter seen right lateral to the L2 posterior spinous process consistent with leakage and/or infection      Blood work performed at outside labs dated May 6, 2017 white blood cell count of 7 total protein is 7.8 albumin is 4.1 monocytes 1010.7 neutrophils were normal at 5.33 and lymphocytes were normal at 9 point correction 0.94 platelets were 233     Labs from June 22, 2017 showed a hemoglobin of 14.3 AST was normalized at 27 ALT was elevated at 71 and phosphatase was normal at 180 transferrin saturation was normal at 15% creatinine was normal at 0.6  Transglutaminase IgA antibody was negative on June 22, 2017  Creatinine IgA antibody was pending. On June 22, 2017 endomysial IgA antibody was negative on June 22, 2017  White blood cell was 8.6 platelets were 423 on June 22, 2017  IgG total was 813 which is normal on June 22, 2017 IgA total was 130 which is normal and June 22, 2017  Ceruloplasmin was normal at 35.2 on June 22, 2017 and alpha-1 antitrypsin in the serum was 142 which is normal on June 22, 2017    TTG antibody was negative on June 22, 2017  IgG subclass  IgG 2 subclass was normal at 311, IgG4 subclass was normal at 8, IgG1 subclass was normal at 676 and IgG 3 subclass was slightly elevated at 122 ()  Ferritin was normal at 43 on June 22, 2017 creatinine IgG antibody was negative on June 22, 2017. On May 6, 2017 AST was 31 and ALT was normal at 54    Medical Decision Making:  Today's Assessment / Status / Plan:     Skin ulcerations  She has developed possible vasculitis.  Will ask for records of the biopsy report.  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  The left hip showed a T score of -1.5 with a bone mineral density of 0.823 g/cm²    1. Long-term use of hydroxychloroquine  CBC WITH DIFFERENTIAL    COMP METABOLIC PANEL    CRP QUANTITIVE (NON-CARDIAC)    WESTERGREN SED RATE   2. Right shoulder pain, unspecified chronicity  DX-SHOULDER 2+ RIGHT     Return in about 3 months (around 1/11/2019).      She agreed and verbalized her agreement and understanding with the current plan. I answered all questions and concerns she has at this time and advised her to call at any time in there interim with questions or concerns in regards to her care.    Thank you for allowing me to participate in her care, I will continue to follow closely.

## 2018-10-11 NOTE — LETTER
[facility]   [address] [place] [address], [address]  JAYSHREE Metzger [address]  Phone: [phone]  Fax: [phone]              Encounter Date: 10/11/2018    Dear Dr. Florence ref. provider found,    It was a pleasure seeing your patient, Enedelia Pang, on 10/11/2018. Diagnoses of Long-term use of hydroxychloroquine and Right shoulder pain, unspecified chronicity were pertinent to this visit.     Please find attached progress note which includes the history I obtained from Ms. Pang, my physical examination findings, my impression and recommendations.      Once again, it was a pleasure participating in your patient's care.  Please feel free to contact me if you have any questions or if I can be of any further assistance to your patients.      Sincerely,    Aimee Andrade M.D.  Electronically Signed          PROGRESS NOTE:  Subjective:   Date of Consultation:10/11/2018  7:24 AM  Primary care physician:Elliott Power M.D.    Reason for Consultation:  Ms. Pang  is a pleasant 46 y.o. year old female who presented with follow-up seronegative Rheumatoid arthritis    Seronegative Rheumatoid Arthritis, non erosive  Since last visit she has had another flare and was noted to that her pathology showed vasculitis.  I have asked her previously to provide the biopsy report.  She has not provided the biopsy report.  She also reports this was done at [facility].  I have referred her to [facility] but this has been declined.  THe review of records, that did not include this most recent biopsy, noted they did not fee there was more to add to the care.      She has been increased to prednisone 15 mg po q day.  Her lesions are improved.    Still on plaquenil 300 mg q day since 3/28/2018 noted no macular toxicity  MRI did not show synovitis but had soft tissue swelling.  The swelling in her hand is responsive to prednisone    She still has not followed up with ENT.  preiovus biopsy report shows that [facility] slide review, per patient, were inconclusive      RHEUM HISTORY:   Ms. Enedelia Pang presented in January 2016 with joint pain and swelling of hands,knees, and weakness and a 3 month history of septal perforation.  ENT initial evaluation noted no disease activity.  She also has a history of abnormal LFT with unclear etiology.  She has had a work-up with GI in early 2017 which was unrevealing.  Also she has had a recurrent rash described as papules that ulcerate identified as MRSA infections.   Her labs did show thrombocytosis which could be inflammatory and anemia which was determined to be iron deficiency anemia.  Her rheumatologic work-up showed RF(-), MG (-), ANCA (-), SCL 70 (-) and anti centromere (-)  Parvovirus and rubella IgM was negative    Hand xrays did show periarticular osteopenia.    1/9/2017  Hepatitis B surface antigen negative  Hepatitis B core antibody negative  Hepatitis C antibody negative  quantiferon negative   HIV negative     CXR 12/6/2017 was negative for edema     11/2016 Nasal biopsy did not show active disease    Previous Medication:  Sulfasalazine 1000 mg BID (off)  Methotrexate 5 tabs weekly q Thursday (off)  Folic acid  Off prednisone    Epistaxis with nasal perforation  Perforation was stable  Did not make ENT appointment due to hospitalization for seizures.    Elevated liver enzymes  Off methotrexate due to elevated LFT.    Stable  She has had a liver biopsy 7/2017.  The liver biopsy suggests it is due to methotrexate toxicity however her elevated LFT had been ongoing prior to start of methotrexate  LFT have been normal.     Chronic diarrhea  On and off diarrhea  (not addressed today)    Renal cysts  Bilateral  Plan to see nephrology    History of retinal disease  She did see ophthalmology     Past Medical History:  Pseudotumor cerebri, MRSA infection history, CVA, miraines, pituatary tumor, migraine headaches, septal perforation. No history of blood clots    Past  "Medical History:   Diagnosis Date   • Allergy, unspecified not elsewhere classified    • Anemia 10/05/2017    Unsure if a current issue.   • Anxiety    • autoimmune    • Depression    • H/O Clostridium difficile infection    • Hx MRSA infection    • Hydrocephalus     with lumbar shunt   • Migraine with aura, with intractable migraine, so stated, without mention of status migrainosus    • MRSA (methicillin resistant Staphylococcus aureus)    • Pituitary abnormality (HCC)    • Staph infection    • Stroke (HCC)     2007     Past Surgical History:   Procedure Laterality Date   •  SHUNT INSERTION  5/31/2017    Procedure:  SHUNT INSERTION;  Surgeon: Drew Berry M.D.;  Location: SURGERY [facility];  Service:    • SPINAL CORD STIMULATOR  12/19/2016    Procedure: SPINAL CORD STIMULATOR FOR: PLACEMENT OF LEFT FLANK OCCIPITAL NERVE STIMULATOR BATTERY PACK;  Surgeon: Oli Oh III, M.D.;  Location: SURGERY [facility];  Service:    • LUMBAR EXPLORATION N/A 8/31/2015    Procedure: LUMBAR EXPLORATION- Lumbar shunt removal ;  Surgeon: Oli Oh III, M.D.;  Location: SURGERY [facility];  Service:    • IRRIGATION & DEBRIDEMENT NEURO N/A 6/28/2015    Procedure: IRRIGATION & DEBRIDEMENT NEURO;  Surgeon: Oli Oh III, M.D.;  Location: SURGERY [facility];  Service:    • APPENDECTOMY     • CHOLECYSTECTOMY     • OTHER      right knee surgery   • OTHER NEUROLOGICAL SURG      occipital nerve stimulator   • TONSILLECTOMY     • TUBAL LIGATION       Allergies   Allergen Reactions   • Sumatriptan Unspecified     Historical=\"Heart stops.\" Reaction  between 1995 to 1997   • Prochlorperazine Swelling     Tongue swelling. Reaction as a teen. (compazine)  Tolerated Phenergan on 02/24/15   • Vancomycin Shortness of Breath and Rash     Reaction in 2005.  D/W patient 8/31/15 - tolerated loading dose of vancomycin administered on 8/30/15 with some itching to chest with decreased infusion rate. Red Man Syndrome  "     Outpatient Encounter Prescriptions as of 10/11/2018   Medication Sig Dispense Refill   • Buprenorphine 5 MCG/HR PATCH WEEKLY buprenorphine 5 mcg/hour weekly transdermal patch   Apply 1 patch every week by transdermal route as directed for 30 days.     • hydroxychloroquine (PLAQUENIL) 200 MG Tab Alternate 200 mg and 400 mg daily po 135 Tab 0   • predniSONE (DELTASONE) 5 MG Tab 30 mg po q day x 5 days and decrease every 5 days by 5 mg back to the current dose of 15 mg 125 Tab 0   • risperiDONE (RISPERDAL) 1 MG Tab Take 1 mg by mouth 2 times a day.     • diphenhydrAMINE (BENADRYL) 50 MG/ML Solution 50 mg by Intramuscular route every 6 hours as needed. With Toradol for migraines     • ketorolac (TORADOL) 60 MG/2ML Solution 60 mg by Intramuscular route every 6 hours as needed. With Benadryl for migraines     • Calcium Citrate-Vitamin D (CALCIUM + D PO) Take 1 Tab by mouth every morning.     • aspirin (ASA) 81 MG Chew Tab chewable tablet Take 81 mg by mouth every morning.     • omeprazole (PRILOSEC) 20 MG delayed-release capsule Take 1 Cap by mouth every day. 30 Cap 2   • gabapentin (NEURONTIN) 600 MG tablet Take 600 mg by mouth 3 times a day.     • duloxetine (CYMBALTA) 60 MG CPEP delayed-release capsule Take 60 mg by mouth 2 times a day.     • [DISCONTINUED] predniSONE (DELTASONE) 5 MG Tab TAKE 1 TABLET EVERY DAY, MAY INCREASE TO 10MG DAILY IF NEEDED 115 Tab 0   • [DISCONTINUED] hydroxychloroquine (PLAQUENIL) 200 MG Tab Alternate 200 mg and 400 mg daily po 45 Tab 4   • levetiracetam (KEPPRA) 1000 MG tablet Take  by mouth.     • [DISCONTINUED] predniSONE (DELTASONE) 5 MG Tab 20 mg po q day x 5 days then decrease every 5 day by 5 mg 50 Tab 0   • [DISCONTINUED] predniSONE (DELTASONE) 5 MG Tab 5 mg po q day may increase to 10 mg  Po q day if needed 115 Tab 0   • [DISCONTINUED] predniSONE (DELTASONE) 5 MG Tab Take 5 mg by mouth every morning.       No facility-administered encounter medications on file as of 10/11/2018.         Social History     Social History   • Marital status:      Spouse name: N/A   • Number of children: N/A   • Years of education: N/A     Occupational History   •       on disability     Social History Main Topics   • Smoking status: Never Smoker   • Smokeless tobacco: Never Used   • Alcohol use Yes      Comment: Rare   • Drug use: No   • Sexual activity: Not on file     Other Topics Concern   • Not on file     Social History Narrative   • No narrative on file      History   Smoking Status   • Never Smoker   Smokeless Tobacco   • Never Used     History   Alcohol Use   • Yes     Comment: Rare     History   Drug Use No      OB History   No data available      No LMP recorded.    G P A L     Family History   Problem Relation Age of Onset   • No Known Problems Mother    • No Known Problems Father        Review of Systems   Constitutional: Negative for chills and fever.   Respiratory: Negative for cough and hemoptysis.    Gastrointestinal: Negative for abdominal pain.   Musculoskeletal: Positive for joint pain.        Hand swelling and joint pain   Skin: Positive for rash.                  Objective:   /78 (BP Location: Left arm, Patient Position: Sitting, BP Cuff Size: Adult)   Pulse 94   Temp 36.2 °C (97.2 °F) (Temporal)   Wt 78.6 kg (173 lb 3.2 oz)   SpO2 94%   BMI 30.68 kg/m²     Vitals:    10/11/18 0746   BP: 104/78   BP Location: Left arm   Patient Position: Sitting   BP Cuff Size: Adult   Pulse: 94   Temp: 36.2 °C (97.2 °F)   TempSrc: Temporal   SpO2: 94%   Weight: 78.6 kg (173 lb 3.2 oz)         Physical Exam   Constitutional: She is oriented to person, place, and time. She appears well-developed and well-nourished. No distress.   HENT:   Head: Normocephalic and atraumatic.   Right Ear: External ear normal.   Left Ear: External ear normal.   Eyes: Pupils are equal, round, and reactive to light. Conjunctivae are normal. Right eye exhibits no discharge. Left eye exhibits no  discharge. No scleral icterus.   Cardiovascular: Normal rate, regular rhythm and normal heart sounds.    Pulmonary/Chest: Effort normal. No stridor. No respiratory distress.   Musculoskeletal: She exhibits no edema.   Right shoulder tendern to palpation with preserved ROM.  Good ROM of her wrists bilateral flexion and extension.   No periarticular swelling of the MCP, PIP of the right hand. Good ROM flexion and extension of elbows bilateral.    Lymphadenopathy:     She has no cervical adenopathy.   Neurological: She is alert and oriented to person, place, and time.   Skin: Skin is warm and dry. She is not diaphoretic.   No alopecia.  On the dorsum of the hand small lesions with ulceration. Also on the foot a healing lesion on the dorsal aspect.   Psychiatric: She has a normal mood and affect. Her behavior is normal. Judgment and thought content normal.   Vitals reviewed.      Assessment:     1. Long-term use of hydroxychloroquine  CBC WITH DIFFERENTIAL    COMP METABOLIC PANEL    CRP QUANTITIVE (NON-CARDIAC)    WESTERGREN SED RATE   2. Right shoulder pain, unspecified chronicity  DX-SHOULDER 2+ RIGHT     Labs:    Lab Results   Component Value Date/Time    QNTTBGOLD Negative 01/09/2017 06:01 PM     Lab Results   Component Value Date/Time    HEPBCORTOT Negative 04/12/2018 03:14 AM    HEPBCORIGM Negative 05/21/2017 02:07 AM    HEPBSAG Negative 04/12/2018 03:14 AM     Lab Results   Component Value Date/Time    HEPCAB Negative 04/12/2018 03:14 AM     Lab Results   Component Value Date/Time    SODIUM 141 06/19/2018 02:45 AM    POTASSIUM 4.0 06/19/2018 02:45 AM    CHLORIDE 105 06/19/2018 02:45 AM    CO2 28 06/19/2018 02:45 AM    GLUCOSE 90 06/19/2018 02:45 AM    BUN 8 06/19/2018 02:45 AM    CREATININE 0.56 06/19/2018 02:45 AM      Lab Results   Component Value Date/Time    WBC 7.2 06/19/2018 02:45 AM    RBC 3.82 (L) 06/19/2018 02:45 AM    HEMOGLOBIN 12.0 06/19/2018 02:45 AM    HEMATOCRIT 36.8 (L) 06/19/2018 02:45 AM    MCV  96.3 06/19/2018 02:45 AM    MCH 31.4 06/19/2018 02:45 AM    MCHC 32.6 (L) 06/19/2018 02:45 AM    MPV 9.9 06/19/2018 02:45 AM    NEUTSPOLYS 68.40 06/13/2018 07:52 PM    LYMPHOCYTES 23.70 06/13/2018 07:52 PM    MONOCYTES 6.10 06/13/2018 07:52 PM    EOSINOPHILS 0.30 06/13/2018 07:52 PM    BASOPHILS 0.90 06/13/2018 07:52 PM    HYPOCHROMIA 1+ 01/11/2017 02:29 PM    ANISOCYTOSIS 1+ 01/11/2017 02:29 PM      Lab Results   Component Value Date/Time    CALCIUM 8.9 06/19/2018 02:45 AM    ASTSGOT 13 06/15/2018 03:00 AM    ALTSGPT 18 06/15/2018 03:00 AM    ALKPHOSPHAT 66 06/15/2018 03:00 AM    TBILIRUBIN 0.4 06/15/2018 03:00 AM    ALBUMIN 4.0 06/15/2018 03:00 AM    ALBUMIN 4.69 11/06/2017 10:23 AM    TOTPROTEIN 6.4 06/15/2018 03:00 AM    TOTPROTEIN 7.50 11/06/2017 10:23 AM     Lab Results   Component Value Date/Time    URICACID 4.3 09/15/2017 03:45 PM    RHEUMFACTN <10 10/14/2017 07:40 PM    ANTINUCAB None Detected 01/06/2017 04:12 AM     Lab Results   Component Value Date/Time    SEDRATEWES 19 04/12/2018 03:14 AM    CREACTPROT 0.41 04/12/2018 03:14 AM     Lab Results   Component Value Date/Time    RUSSELVIPER 40.3 09/15/2017 03:45 PM    DRVVTINTERP Not Present 09/15/2017 03:45 PM     Lab Results   Component Value Date/Time    Q4WGYHFACEB 178.0 01/06/2017 04:12 AM    W1LVCTBKJDB 37.0 01/06/2017 04:12 AM    IGGCARDIOLI 5 09/15/2017 03:45 PM    IGMCARDIOLI 6 09/15/2017 03:45 PM    IGACARDIOLI 2 09/15/2017 03:45 PM    CRYOGLOBULIN NEG 72Hour 04/17/2017 03:53 PM     Lab Results   Component Value Date/Time    HUBZWAJ01 0 01/06/2017 04:12 AM    CENTROMBAB 2 01/06/2017 04:12 AM     Lab Results   Component Value Date/Time    ANCAIGG <1:20 01/06/2017 04:12 AM    V9NVAWBJSJE 178.0 01/06/2017 04:12 AM    ANTISSBSJ 0 11/18/2017 04:30 AM     Lab Results   Component Value Date/Time    COLORURINE Yellow 06/14/2018 03:10 AM    SPECGRAVITY 1.007 06/14/2018 03:10 AM    PHURINE 7.0 06/14/2018 03:10 AM    GLUCOSEUR Negative 06/14/2018 03:10 AM     KETONES Negative 06/14/2018 03:10 AM    PROTEINURIN Negative 06/14/2018 03:10 AM     No results found for: TOTPROTUR, TOTALVOLUME, UIPEUGHW40  Lab Results   Component Value Date/Time    SSA60 14 11/18/2017 04:30 AM    SSA52 0 11/18/2017 04:30 AM     Lab Results   Component Value Date/Time    HBA1C 5.0 11/16/2017 11:12 PM     Lab Results   Component Value Date/Time    CPKTOTAL 32 11/17/2017 04:43 AM     Lab Results   Component Value Date/Time    G6PD 20.4 (H) 01/10/2017 01:54 PM     Lab Results   Component Value Date/Time    WEPN24TMNR Negative 10/14/2017 07:40 PM     No results found for: ACESERUM  Lab Results   Component Value Date/Time    25HYDROXY 62 02/22/2018 09:36 AM     No results found for: TSH, FREEDIR  Lab Results   Component Value Date/Time    TSHULTRASEN 4.600 05/28/2018 08:57 AM    FREET4 0.78 11/20/2017 03:00 AM     Lab Results   Component Value Date/Time    MICROSOMALA 27.3 (H) 02/22/2018 09:36 AM    ANTITHYROGL <0.9 02/22/2018 09:36 AM     No results found for: IGGLYMEABS  Lab Results   Component Value Date/Time    ANTIMITOCHO 8.5 04/12/2018 03:14 AM    FACTIN 6 04/12/2018 03:14 AM     Lab Results   Component Value Date/Time     06/15/2018 03:00 AM    TTRANSIGA 0 06/15/2018 03:00 AM     No results found for: FLTYPE, CRYSTALSBDF  No results found for: ISTATICAL  No results found for: ISTATCREAT  No results found for: CTELOPEP  No results found for: GBMABG  No results found for: PTHINTACT  1/27/2015 hepatitis E IgM    1/27/2015 ceruloplasmin = 29.7  1/27/2015 smith antibody = 9 (normal)  1/27/2015 MG negative  1/27/2015 alpha antitrypsin 119 ()  1/27/2015 ferritin = 75 (normal)  1/27/2015 GGT = 327 (0-55)    1/27/2015 IgG 4 = 11  10/31/2016 and 1/27/2015  p ANCA (-), c ANCA (-), PR3 (-)  10/31/2016 MPO (-)  12/7/2014 AST = 23 ALT = 137 Alk phos = 205  6/10/2016 ALT = 921 AST = 821 Alk phos = 537  Labs from JUne 2016 shows low albumin 2.7 and AST 92 and ALT of 370    6/11/2016; AST=92, ALT   = 370 Alk Phos = 331 Hgb = 8.6 MCV = 74.7 t bili = 1.2  10/15/2015: Hgb = 9.1 Plt =   ALT= 357 AST =127 Alk phos = 469 creatinine = 0.7    US LE venous doppler bilateral 6/30/2014  Probable baker's cyst on left lower extremity    Left hand XR 8/14/2014  Soft tissue swelling over the dorsum.  No erorsons no fractures     CT abdomen/Pelvis w/o Contrast 9/4/2014  Small amount of fluid in cul-de-sac no abnormalities in liver.  Abdominal aorta was normal.  Adrenal glands normal.  coritical medullatry calcifications noted of left kidney    CT head w w/o contrast 10/15/2016  No acute abnormalities and on CT maxillofacial w/ contrast - no evidence of sinusitis    CTchest w/con 1/12/2016  No findings to suggest aortic dissection or pulmonary embolus  Left renal 18 mm cyst in the ventral cortex medially    CT spine lumbar w/o contrast  8/30/2015  Small fluid  Collection posterior junction of the epidural catheter seen right lateral to the L2 posterior spinous process consistent with leakage and/or infection      Blood work performed at outside labs dated May 6, 2017 white blood cell count of 7 total protein is 7.8 albumin is 4.1 monocytes 1010.7 neutrophils were normal at 5.33 and lymphocytes were normal at 9 point correction 0.94 platelets were 233     Labs from June 22, 2017 showed a hemoglobin of 14.3 AST was normalized at 27 ALT was elevated at 71 and phosphatase was normal at 180 transferrin saturation was normal at 15% creatinine was normal at 0.6  Transglutaminase IgA antibody was negative on June 22, 2017  Creatinine IgA antibody was pending. On June 22, 2017 endomysial IgA antibody was negative on June 22, 2017  White blood cell was 8.6 platelets were 423 on June 22, 2017  IgG total was 813 which is normal on June 22, 2017 IgA total was 130 which is normal and June 22, 2017  Ceruloplasmin was normal at 35.2 on June 22, 2017 and alpha-1 antitrypsin in the serum was 142 which is normal on June 22, 2017    TTG antibody  was negative on June 22, 2017  IgG subclass  IgG 2 subclass was normal at 311, IgG4 subclass was normal at 8, IgG1 subclass was normal at 676 and IgG 3 subclass was slightly elevated at 122 ()  Ferritin was normal at 43 on June 22, 2017 creatinine IgG antibody was negative on June 22, 2017. On May 6, 2017 AST was 31 and ALT was normal at 54    Medical Decision Making:  Today's Assessment / Status / Plan:     Skin ulcerations  She has developed possible vasculitis.  Will ask for records of the biopsy report.  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  The left hip showed a T score of -1.5 with a bone mineral density of 0.823 g/cm²    1. Long-term use of hydroxychloroquine  CBC WITH DIFFERENTIAL    COMP METABOLIC PANEL    CRP QUANTITIVE (NON-CARDIAC)    WESTERGREN SED RATE   2. Right shoulder pain, unspecified chronicity  DX-SHOULDER 2+ RIGHT     Return in about 3 months (around 1/11/2019).      She agreed and verbalized her agreement and understanding with the current plan. I answered all questions and concerns she has at this time and advised her to call at any time in there interim with questions or concerns in regards to her care.    Thank you for allowing me to participate in her care, I will continue to follow closely.

## 2018-10-16 DIAGNOSIS — Z79.899 LONG-TERM USE OF HYDROXYCHLOROQUINE: ICD-10-CM

## 2018-10-19 ENCOUNTER — OFFICE VISIT (OUTPATIENT)
Dept: NEUROLOGY | Facility: MEDICAL CENTER | Age: 48
End: 2018-10-19
Payer: MEDICARE

## 2018-10-19 ENCOUNTER — TELEPHONE (OUTPATIENT)
Dept: NEUROLOGY | Facility: MEDICAL CENTER | Age: 48
End: 2018-10-19

## 2018-10-19 VITALS
HEART RATE: 122 BPM | DIASTOLIC BLOOD PRESSURE: 60 MMHG | HEIGHT: 63 IN | TEMPERATURE: 99.1 F | SYSTOLIC BLOOD PRESSURE: 94 MMHG | OXYGEN SATURATION: 92 % | BODY MASS INDEX: 29.95 KG/M2 | RESPIRATION RATE: 16 BRPM | WEIGHT: 169 LBS

## 2018-10-19 DIAGNOSIS — Z98.2 S/P VP SHUNT: Chronic | ICD-10-CM

## 2018-10-19 DIAGNOSIS — G05.3 AUTOIMMUNE CEREBRITIS (HCC): ICD-10-CM

## 2018-10-19 DIAGNOSIS — M35.9 AUTOIMMUNE CEREBRITIS (HCC): ICD-10-CM

## 2018-10-19 DIAGNOSIS — R56.9 SEIZURES (HCC): ICD-10-CM

## 2018-10-19 PROCEDURE — 99214 OFFICE O/P EST MOD 30 MIN: CPT | Performed by: PSYCHIATRY & NEUROLOGY

## 2018-10-19 RX ORDER — DIVALPROEX SODIUM 500 MG/1
500 TABLET, EXTENDED RELEASE ORAL 2 TIMES DAILY
Qty: 60 TAB | Refills: 5 | Status: ON HOLD | OUTPATIENT
Start: 2018-10-19 | End: 2019-01-18

## 2018-10-19 NOTE — PATIENT INSTRUCTIONS
IMPRESSION:      1. Intractable Headache since age of 15 YO  post Botox ([facility]--- Botox, occipital nerve block, OxyContin)--  2. Intractable spells-- started with eyelid twitching since 2009-- subtle seizures remains possible  3. Status Post occipital nerve stimulator 2003 in the [place]--status post 6 revisions-- in [place]- , recently removed  4. Arthritis? Saw specialist in [place]( was referred to [place] by Rheumatologist here), Skin papules in the past 2 years or so status post skin biopsy-- Plaquenil-- non-descriptive vasculitis  5. Status post  shunt 2017 (under the impression of pseudotumor cerebri). Lumbar shunt in [place] for the pseudotumor cerebri--- complicated with infection      PLAN/RECOMMENDATIONS:        We will put the patient back on Depakote ER 500mg two times per day  The concern of hepatic dysfunction secondary to Depakote can be monitored---     The patient went through many procedures related with headache-- including lumbar shunt,  shunt and occipital nerve stimulator( not MRI compatible)      The patient is here for spells and headache--- one spell recorded in the hospital was psychogenic, however, the patient's routine EEG is not normal either  The MRI of brain -- not remarkable  Continue Keppra for now      Botox under the care of pain specialist    We will offer spinal tap again to look for inflammation-- will need pre-authorization first  Before spinal tap, there are blood tests         ________________________________________________________________________     This intensive video scalp EEG  from 4/9/2018 to 4/13/2018 is consistent with                  1. Focal nonspecific cortical dysfunction over right posterior derivations. Mild degree        There are no epileptiform discharges and no electrographic seizures in this record     ________________________________________________________________________      5/2018   1.  Ventriculostomy shunt  catheter in place. No hydrocephalus.  2.  Mild white matter changes  3.  No evidence of mesial temporal sclerosis, gray matter heterotopia or cortical dysplasia or intracranial mass        ________________________________________________________________________              SIGNATURE:  Drew Ribera      CC:  Elliott Power M.D.      INTERPRETATION:           ________________________________________________________________________     This intensive video scalp EEG  from 4/9/2018 to 4/13/2018 is consistent with                  1. Focal nonspecific cortical dysfunction over right posterior derivations. Mild degree        There are no epileptiform discharges and no electrographic seizures in this record     ________________________________________________________________________

## 2018-10-19 NOTE — TELEPHONE ENCOUNTER
HELP-- not urgent    Please use the following to help the patient get pre-authrization of   The following 2 tests--                 Autoimmune encephalitis panel -- [facility],                SPINAL TAP      ________________________________________________________________________    Please keep me up to date regarding the Preauthorization and I have the CSF stored for 30 days and ready to send.    Below are the tests information.    Test:      Encephalopathy, Autoimmune Evaluation, Spinal Fluid test code ENCEC to [facility] min 2ml csf refrigerated.    CPT:                      30495-Ypblugle VGKC autoantibody   94728-CTDQ-1   01716-Zfrxpqxpwih   26733-QUCF-0   51355-BMWM-4 86256-ASYA-3 86256-CRMP-5-IgG 86256-PCA-1 86256-PCA-2 86256-PCA-Tr 95103-LSIZA-Ef 33699-UPEOH-Ge 51667-EOANR-Dp [id number]     COST:                   $1436.00            Deidre PATEL(ASCP)   Clinical  Lead   [address] [place] [address] JAYSHREE Metzger  [id number]  P: [phone]   F: [phone]  [email or web address]

## 2018-10-19 NOTE — PROGRESS NOTES
NEUROLOGY NOTE    Referring Physician  Elliott Power M.D.      CHIEF COMPLAINT:  Spells of flashing light and shivering of eyelids now managed by keppra  Chief Complaint   Patient presents with   • Follow-Up     Migraines       PRESENT ILLNESS:       She had occipital nerve stimulator, which was placed   apparently in [place], [place].  She also tells me she had pituitary tumor or   mass, but due to her occipital stimulator, we are not able to obtain brain   MRI.         1. Intractable Headache since age of 13YO  post botox ([facility]ist--- botox, occipital nerve block, oxycontin)--  2. Intractable spells-- started with eyelid twitching since 2009  3. Status Post occipital nerve stimulator 2003 in the [facility]--status post 6 revisions-- in [place]  4. Arthritis? Seeing specialist in [place]( was referred to [place] by Rheumatologist here)    Used to take depakote for headache and helped -- for 2 decades, was stopped 2015 because questionable elevated liver functions    PAST MEDICAL HISTORY:  Past Medical History:   Diagnosis Date   • Allergy, unspecified not elsewhere classified    • Anemia 10/05/2017    Unsure if a current issue.   • Anxiety    • autoimmune    • Depression    • H/O Clostridium difficile infection    • Hx MRSA infection    • Hydrocephalus     with lumbar shunt   • Migraine with aura, with intractable migraine, so stated, without mention of status migrainosus    • MRSA (methicillin resistant Staphylococcus aureus)    • Pituitary abnormality (HCC)    • Staph infection    • Stroke (HCC)     2007       PAST SURGICAL HISTORY:  Past Surgical History:   Procedure Laterality Date   •  SHUNT INSERTION  5/31/2017    Procedure:  SHUNT INSERTION;  Surgeon: Drew Berry M.D.;  Location: SURGERY [facility];  Service:    • SPINAL CORD STIMULATOR  12/19/2016    Procedure: SPINAL CORD STIMULATOR FOR: PLACEMENT OF LEFT FLANK OCCIPITAL NERVE STIMULATOR BATTERY PACK;  Surgeon:  "Oli Oh III, M.D.;  Location: SURGERY [facility];  Service:    • LUMBAR EXPLORATION N/A 8/31/2015    Procedure: LUMBAR EXPLORATION- Lumbar shunt removal ;  Surgeon: Oli Oh III, M.D.;  Location: SURGERY [facility];  Service:    • IRRIGATION & DEBRIDEMENT NEURO N/A 6/28/2015    Procedure: IRRIGATION & DEBRIDEMENT NEURO;  Surgeon: Oli Oh III, M.D.;  Location: SURGERY [facility];  Service:    • APPENDECTOMY     • CHOLECYSTECTOMY     • OTHER      right knee surgery   • OTHER NEUROLOGICAL SURG      occipital nerve stimulator   • TONSILLECTOMY     • TUBAL LIGATION         FAMILY HISTORY:  Family History   Problem Relation Age of Onset   • No Known Problems Mother    • No Known Problems Father        SOCIAL HISTORY:  Social History     Social History   • Marital status:      Spouse name: N/A   • Number of children: N/A   • Years of education: N/A     Occupational History   •       on disability     Social History Main Topics   • Smoking status: Never Smoker   • Smokeless tobacco: Never Used   • Alcohol use Yes      Comment: Rare   • Drug use: No   • Sexual activity: Not on file     Other Topics Concern   • Not on file     Social History Narrative   • No narrative on file     ALLERGIES:  Allergies   Allergen Reactions   • Sumatriptan Unspecified     Historical=\"Heart stops.\" Reaction  between 1995 to 1997   • Prochlorperazine Swelling     Tongue swelling. Reaction as a teen. (compazine)  Tolerated Phenergan on 02/24/15   • Vancomycin Shortness of Breath and Rash     Reaction in 2005.  D/W patient 8/31/15 - tolerated loading dose of vancomycin administered on 8/30/15 with some itching to chest with decreased infusion rate. Red Man Syndrome     TOBHX  History   Smoking Status   • Never Smoker   Smokeless Tobacco   • Never Used     ALCHX  History   Alcohol Use   • Yes     Comment: Rare     DRUGHX  History   Drug Use No           MEDICATIONS:  Current Outpatient " "Prescriptions   Medication   • NON SPECIFIED   • Buprenorphine 5 MCG/HR PATCH WEEKLY   • hydroxychloroquine (PLAQUENIL) 200 MG Tab   • predniSONE (DELTASONE) 5 MG Tab   • levetiracetam (KEPPRA) 1000 MG tablet   • risperiDONE (RISPERDAL) 1 MG Tab   • diphenhydrAMINE (BENADRYL) 50 MG/ML Solution   • ketorolac (TORADOL) 60 MG/2ML Solution   • Calcium Citrate-Vitamin D (CALCIUM + D PO)   • aspirin (ASA) 81 MG Chew Tab chewable tablet   • omeprazole (PRILOSEC) 20 MG delayed-release capsule   • gabapentin (NEURONTIN) 600 MG tablet   • duloxetine (CYMBALTA) 60 MG CPEP delayed-release capsule     No current facility-administered medications for this visit.        REVIEW OF SYSTEM:    Constitutional: Denies fevers, Denies weight changes   Eyes: Denies changes in vision, no eye pain   Ears/Nose/Throat/Mouth: Denies nasal congestion or sore throat   Cardiovascular: Denies chest pain or palpitations   Respiratory: Denies SOB.   Gastrointestinal/Hepatic: Denies abdominal pain, nausea, vomiting, diarrhea, constipation or GI bleeding   Genitourinary: Denies bladder dysfunction, dysuria or frequency   Musculoskeletal/Rheum: Denies joint pain and swelling   Skin/Breast: Denies rash, denies breast lumps or discharge   Neurological: intractable headache, occipital stimulator ( 2016 occipital stimulator battery ) off  Psychiatric: denies mood disorder   Endocrine: denies hx of diabetes or thyroid dysfunction   Heme/Oncology/Lymph Nodes: Denies enlarged lymph nodes, denies brusing or known bleeding disorder   Allergic/Immunologic: Denies hx of allergies         PHYSICAL AND NEUROLOGICAL EXMAINATIONS:  VITAL SIGNS: BP (!) 94/60 (BP Location: Right arm, Patient Position: Sitting, BP Cuff Size: Adult)   Pulse (!) 122   Temp 37.3 °C (99.1 °F) (Temporal)   Resp 16   Ht 1.6 m (5' 2.99\")   Wt 76.7 kg (169 lb)   SpO2 92%   BMI 29.94 kg/m²   CURRENT WEIGHT:  BMI: Body mass index is 29.94 kg/m².  PREVIOUS WEIGHTS:  Wt Readings from Last 25 " Encounters:   10/19/18 76.7 kg (169 lb)   10/11/18 78.6 kg (173 lb 3.2 oz)   08/01/18 76.2 kg (168 lb)   06/17/18 86.7 kg (191 lb 2.2 oz)   06/11/18 75.8 kg (167 lb)   06/08/18 75.5 kg (166 lb 8.9 oz)   05/30/18 81.8 kg (180 lb 5.4 oz)   04/09/18 82.4 kg (181 lb 10.5 oz)   03/15/18 78.5 kg (173 lb)   02/22/18 76.4 kg (168 lb 6.9 oz)   02/08/18 76.2 kg (168 lb)   12/14/17 76 kg (167 lb 9.6 oz)   12/12/17 76.7 kg (169 lb)   11/19/17 74.3 kg (163 lb 12.8 oz)   11/08/17 75.8 kg (167 lb)   11/07/17 75.8 kg (167 lb)   11/06/17 75.8 kg (167 lb 1.7 oz)   11/02/17 75.6 kg (166 lb 11.2 oz)   11/01/17 75.8 kg (167 lb)   10/15/17 72.9 kg (160 lb 11.5 oz)   10/11/17 74.6 kg (164 lb 7.4 oz)   10/07/17 72.5 kg (159 lb 13.3 oz)   10/05/17 74.8 kg (165 lb)   09/15/17 76.2 kg (168 lb)   08/29/17 74.8 kg (165 lb)       General appearance of patient: WDWN(+) NAD(+)    EYES  o Fundus : Papilledem(-) Exudates(-) Hemorrhage(-)  Nervous System  Orientation to time, place and person(+)  Memory normal(-)  Language: aphasia(-)  Knowledge: past(+) Current(+)  Attention(+)  Cranial Nerves  • Nerve 2: intact  • Nerve 3,4,6: intact  • Nerve 5 : intact  • Nerve 7: intact  • Nerve 8: intact  • Nerve 9 & 10: intact  • Nerve 11: intact  • Nerve 12: intact  Muscle Power and muscle tone: symmetric, normal in upper and lower  Sensory System: Pin sensation intact(+)  Reflexes: symmetric throughout  Cerebellar Function FNP normal   Gait : Steady(-)  Heart and Vascular  Peripheral Vasucular system : Edema (-) Swelling(-)  RHB, Breathing sound clear  abdomen bowel sound normoactive  Extremities freely moveable  Joints no contracture  Wound over left foot     NEUROIMAGING: I reviewed the MRI/CT of brain       Seeing   Rheumatologist--- Rheumatologist referred the patient to [place]  Pain specialist-- for botox, occipital nerve block, oxycontin  Wound specialist--- autoimmune?-- seeing     Moved to [place] 5 years    Results for BRIGID DAWSON (MRN [id number])  as of 2/27/2018 15:10   Ref. Range 11/18/2017 04:30 11/20/2017 03:00 2/22/2018 09:36   Vitamin B6 Latest Ref Range: 20.0 - 125.0 nmol/L   325.1 (H)   25-Hydroxy   Vitamin D 25 Latest Ref Range: 30 - 100 ng/mL   62   Free T-4 Latest Ref Range: 0.53 - 1.43 ng/dL  0.78    Stat C-Reactive Protein Latest Ref Range: 0.00 - 0.75 mg/dL 0.27  0.07   Prolactin Latest Ref Range: 2.80 - 26.00 ng/mL 63.18 (H)     Microsomal -Tpo- Abs Latest Ref Range: 0.0 - 9.0 IU/mL   27.3 (H)       ________________________________________________________________________    Please avoid supplementation like Vit B Complex    Vit B6 is better limited to less than 50mg per day  The maximum daily intake of vitamin B6 in adults is 100 milligrams.      [email or web address]  ________________________________________________________________________      The last time, the patient was fully functional-- 3 years ago    IMPRESSION:      1. Intractable Headache since age of 15 YO  post Botox ([facility] Pain Specialist--- Botox, occipital nerve block, OxyContin)--  2. Intractable spells-- started with eyelid twitching since 2009-- subtle seizures remains possible  3. Status Post occipital nerve stimulator 2003 in the [place]--status post 6 revisions-- in [place]- , recently removed  4. Arthritis? Saw specialist in [place]( was referred to [place] by Rheumatologist here), Skin papules in the past 2 years or so status post skin biopsy-- Plaquenil-- non-descriptive vasculitis  5. Status post  shunt 2017 (under the impression of pseudotumor cerebri). Lumbar shunt in [place] for the pseudotumor cerebri--- complicated with infection      PLAN/RECOMMENDATIONS:        We will put the patient back on Depakote ER 500mg two times per day  The concern of hepatic dysfunction secondary to Depakote can be monitored---     The patient went through many procedures related with headache-- including lumbar  shunt,  shunt and occipital nerve stimulator( not MRI compatible)      The patient is here for spells and headache--- one spell recorded in the hospital was psychogenic, however, the patient's routine EEG is not normal either  The MRI of brain -- not remarkable  Continue Keppra for now      Botox under the care of pain specialist    We will offer spinal tap again to look for inflammation-- will need pre-authorization first  Before spinal tap, there are blood tests         ________________________________________________________________________     This intensive video scalp EEG  from 4/9/2018 to 4/13/2018 is consistent with                  1. Focal nonspecific cortical dysfunction over right posterior derivations. Mild degree        There are no epileptiform discharges and no electrographic seizures in this record     ________________________________________________________________________      5/2018   1.  Ventriculostomy shunt catheter in place. No hydrocephalus.  2.  Mild white matter changes  3.  No evidence of mesial temporal sclerosis, gray matter heterotopia or cortical dysplasia or intracranial mass        ________________________________________________________________________              SIGNATURE:  Drew Ribera      CC:  Elliott Power M.D.      INTERPRETATION:           ________________________________________________________________________     This intensive video scalp EEG  from 4/9/2018 to 4/13/2018 is consistent with                  1. Focal nonspecific cortical dysfunction over right posterior derivations. Mild degree        There are no epileptiform discharges and no electrographic seizures in this record     ________________________________________________________________________

## 2018-11-06 DIAGNOSIS — G44.011 INTRACTABLE EPISODIC CLUSTER HEADACHE: Primary | ICD-10-CM

## 2018-11-06 DIAGNOSIS — G05.3 AUTOIMMUNE CEREBRITIS (HCC): ICD-10-CM

## 2018-11-06 DIAGNOSIS — R79.1 ABNORMAL COAGULATION PROFILE: ICD-10-CM

## 2018-11-06 DIAGNOSIS — M35.9 AUTOIMMUNE CEREBRITIS (HCC): ICD-10-CM

## 2018-11-06 NOTE — TELEPHONE ENCOUNTER
No prior auth required for fluid testing sent to [facility]. Per Lyle @ [facility] ref# [id number].

## 2019-01-06 ENCOUNTER — APPOINTMENT (OUTPATIENT)
Dept: RADIOLOGY | Facility: MEDICAL CENTER | Age: 49
End: 2019-01-06
Attending: EMERGENCY MEDICINE
Payer: MEDICARE

## 2019-01-06 ENCOUNTER — HOSPITAL ENCOUNTER (EMERGENCY)
Facility: MEDICAL CENTER | Age: 49
End: 2019-01-06
Attending: EMERGENCY MEDICINE
Payer: MEDICARE

## 2019-01-06 VITALS
BODY MASS INDEX: 30.12 KG/M2 | TEMPERATURE: 98 F | SYSTOLIC BLOOD PRESSURE: 133 MMHG | HEIGHT: 63 IN | DIASTOLIC BLOOD PRESSURE: 94 MMHG | HEART RATE: 109 BPM | OXYGEN SATURATION: 93 % | WEIGHT: 170 LBS | RESPIRATION RATE: 19 BRPM

## 2019-01-06 DIAGNOSIS — G40.909 SEIZURE DISORDER (HCC): ICD-10-CM

## 2019-01-06 DIAGNOSIS — L03.114 CELLULITIS OF LEFT HAND: ICD-10-CM

## 2019-01-06 DIAGNOSIS — G43.109 MIGRAINE WITH AURA AND WITHOUT STATUS MIGRAINOSUS, NOT INTRACTABLE: ICD-10-CM

## 2019-01-06 LAB
ALBUMIN SERPL BCP-MCNC: 4 G/DL (ref 3.2–4.9)
ALBUMIN/GLOB SERPL: 1.5 G/DL
ALP SERPL-CCNC: 161 U/L (ref 30–99)
ALT SERPL-CCNC: 333 U/L (ref 2–50)
ANION GAP SERPL CALC-SCNC: 12 MMOL/L (ref 0–11.9)
APPEARANCE UR: ABNORMAL
APTT PPP: 33.2 SEC (ref 24.7–36)
AST SERPL-CCNC: 271 U/L (ref 12–45)
BACTERIA #/AREA URNS HPF: ABNORMAL /HPF
BASOPHILS # BLD AUTO: 0.6 % (ref 0–1.8)
BASOPHILS # BLD: 0.05 K/UL (ref 0–0.12)
BILIRUB SERPL-MCNC: 0.5 MG/DL (ref 0.1–1.5)
BILIRUB UR QL STRIP.AUTO: NEGATIVE
BUN SERPL-MCNC: 4 MG/DL (ref 8–22)
CALCIUM SERPL-MCNC: 9.1 MG/DL (ref 8.5–10.5)
CHLORIDE SERPL-SCNC: 103 MMOL/L (ref 96–112)
CO2 SERPL-SCNC: 23 MMOL/L (ref 20–33)
COLOR UR: YELLOW
CREAT SERPL-MCNC: 0.65 MG/DL (ref 0.5–1.4)
EOSINOPHIL # BLD AUTO: 0.01 K/UL (ref 0–0.51)
EOSINOPHIL NFR BLD: 0.1 % (ref 0–6.9)
EPI CELLS #/AREA URNS HPF: ABNORMAL /HPF
ERYTHROCYTE [DISTWIDTH] IN BLOOD BY AUTOMATED COUNT: 44.6 FL (ref 35.9–50)
GLOBULIN SER CALC-MCNC: 2.6 G/DL (ref 1.9–3.5)
GLUCOSE SERPL-MCNC: 82 MG/DL (ref 65–99)
GLUCOSE UR STRIP.AUTO-MCNC: NEGATIVE MG/DL
HCT VFR BLD AUTO: 40.1 % (ref 37–47)
HGB BLD-MCNC: 13.6 G/DL (ref 12–16)
HYALINE CASTS #/AREA URNS LPF: ABNORMAL /LPF
IMM GRANULOCYTES # BLD AUTO: 0.04 K/UL (ref 0–0.11)
IMM GRANULOCYTES NFR BLD AUTO: 0.5 % (ref 0–0.9)
INR PPP: 1.06 (ref 0.87–1.13)
KETONES UR STRIP.AUTO-MCNC: ABNORMAL MG/DL
LEUKOCYTE ESTERASE UR QL STRIP.AUTO: NEGATIVE
LYMPHOCYTES # BLD AUTO: 1.35 K/UL (ref 1–4.8)
LYMPHOCYTES NFR BLD: 15.6 % (ref 22–41)
MCH RBC QN AUTO: 32.1 PG (ref 27–33)
MCHC RBC AUTO-ENTMCNC: 33.9 G/DL (ref 33.6–35)
MCV RBC AUTO: 94.6 FL (ref 81.4–97.8)
MICRO URNS: ABNORMAL
MONOCYTES # BLD AUTO: 0.74 K/UL (ref 0–0.85)
MONOCYTES NFR BLD AUTO: 8.6 % (ref 0–13.4)
NEUTROPHILS # BLD AUTO: 6.45 K/UL (ref 2–7.15)
NEUTROPHILS NFR BLD: 74.6 % (ref 44–72)
NITRITE UR QL STRIP.AUTO: NEGATIVE
NRBC # BLD AUTO: 0 K/UL
NRBC BLD-RTO: 0 /100 WBC
PH UR STRIP.AUTO: 6.5 [PH]
PLATELET # BLD AUTO: 310 K/UL (ref 164–446)
PMV BLD AUTO: 9.5 FL (ref 9–12.9)
POTASSIUM SERPL-SCNC: 3.8 MMOL/L (ref 3.6–5.5)
PROT SERPL-MCNC: 6.6 G/DL (ref 6–8.2)
PROT UR QL STRIP: NEGATIVE MG/DL
PROTHROMBIN TIME: 13.9 SEC (ref 12–14.6)
RBC # BLD AUTO: 4.24 M/UL (ref 4.2–5.4)
RBC # URNS HPF: ABNORMAL /HPF
RBC UR QL AUTO: NEGATIVE
SODIUM SERPL-SCNC: 138 MMOL/L (ref 135–145)
SP GR UR STRIP.AUTO: 1.01
UROBILINOGEN UR STRIP.AUTO-MCNC: 0.2 MG/DL
VALPROATE SERPL-MCNC: 21.7 UG/ML (ref 50–100)
WBC # BLD AUTO: 8.6 K/UL (ref 4.8–10.8)
WBC #/AREA URNS HPF: ABNORMAL /HPF

## 2019-01-06 PROCEDURE — 85610 PROTHROMBIN TIME: CPT

## 2019-01-06 PROCEDURE — 700105 HCHG RX REV CODE 258: Performed by: EMERGENCY MEDICINE

## 2019-01-06 PROCEDURE — 80164 ASSAY DIPROPYLACETIC ACD TOT: CPT

## 2019-01-06 PROCEDURE — 700111 HCHG RX REV CODE 636 W/ 250 OVERRIDE (IP): Performed by: EMERGENCY MEDICINE

## 2019-01-06 PROCEDURE — 96374 THER/PROPH/DIAG INJ IV PUSH: CPT

## 2019-01-06 PROCEDURE — 85730 THROMBOPLASTIN TIME PARTIAL: CPT

## 2019-01-06 PROCEDURE — 99285 EMERGENCY DEPT VISIT HI MDM: CPT

## 2019-01-06 PROCEDURE — 80053 COMPREHEN METABOLIC PANEL: CPT

## 2019-01-06 PROCEDURE — 81001 URINALYSIS AUTO W/SCOPE: CPT

## 2019-01-06 PROCEDURE — 96376 TX/PRO/DX INJ SAME DRUG ADON: CPT

## 2019-01-06 PROCEDURE — 36415 COLL VENOUS BLD VENIPUNCTURE: CPT

## 2019-01-06 PROCEDURE — 700102 HCHG RX REV CODE 250 W/ 637 OVERRIDE(OP): Performed by: EMERGENCY MEDICINE

## 2019-01-06 PROCEDURE — 96375 TX/PRO/DX INJ NEW DRUG ADDON: CPT

## 2019-01-06 PROCEDURE — 85025 COMPLETE CBC W/AUTO DIFF WBC: CPT

## 2019-01-06 PROCEDURE — 70450 CT HEAD/BRAIN W/O DYE: CPT

## 2019-01-06 PROCEDURE — A9270 NON-COVERED ITEM OR SERVICE: HCPCS | Performed by: EMERGENCY MEDICINE

## 2019-01-06 RX ORDER — MORPHINE SULFATE 4 MG/ML
4 INJECTION, SOLUTION INTRAMUSCULAR; INTRAVENOUS ONCE
Status: COMPLETED | OUTPATIENT
Start: 2019-01-06 | End: 2019-01-06

## 2019-01-06 RX ORDER — DOXYCYCLINE 100 MG/1
100 CAPSULE ORAL 2 TIMES DAILY
Qty: 20 CAP | Refills: 0 | Status: SHIPPED | OUTPATIENT
Start: 2019-01-06 | End: 2019-01-16

## 2019-01-06 RX ORDER — SODIUM CHLORIDE 9 MG/ML
1000 INJECTION, SOLUTION INTRAVENOUS ONCE
Status: COMPLETED | OUTPATIENT
Start: 2019-01-06 | End: 2019-01-06

## 2019-01-06 RX ORDER — DIPHENHYDRAMINE HYDROCHLORIDE 50 MG/ML
25 INJECTION INTRAMUSCULAR; INTRAVENOUS ONCE
Status: COMPLETED | OUTPATIENT
Start: 2019-01-06 | End: 2019-01-06

## 2019-01-06 RX ORDER — METOCLOPRAMIDE HYDROCHLORIDE 5 MG/ML
10 INJECTION INTRAMUSCULAR; INTRAVENOUS ONCE
Status: COMPLETED | OUTPATIENT
Start: 2019-01-06 | End: 2019-01-06

## 2019-01-06 RX ORDER — LEVETIRACETAM 500 MG/1
1500 TABLET ORAL ONCE
Status: COMPLETED | OUTPATIENT
Start: 2019-01-06 | End: 2019-01-06

## 2019-01-06 RX ORDER — LORAZEPAM 2 MG/ML
0.5 INJECTION INTRAMUSCULAR ONCE
Status: COMPLETED | OUTPATIENT
Start: 2019-01-06 | End: 2019-01-06

## 2019-01-06 RX ORDER — LEVETIRACETAM 500 MG/1
500 TABLET ORAL 2 TIMES DAILY
Qty: 60 TAB | Refills: 0 | Status: ON HOLD | OUTPATIENT
Start: 2019-01-06 | End: 2019-01-18

## 2019-01-06 RX ORDER — DOXYCYCLINE 100 MG/1
100 TABLET ORAL ONCE
Status: COMPLETED | OUTPATIENT
Start: 2019-01-06 | End: 2019-01-06

## 2019-01-06 RX ORDER — ONDANSETRON 2 MG/ML
4 INJECTION INTRAMUSCULAR; INTRAVENOUS ONCE
Status: COMPLETED | OUTPATIENT
Start: 2019-01-06 | End: 2019-01-06

## 2019-01-06 RX ORDER — LORAZEPAM 2 MG/ML
1 INJECTION INTRAMUSCULAR ONCE
Status: COMPLETED | OUTPATIENT
Start: 2019-01-06 | End: 2019-01-06

## 2019-01-06 RX ADMIN — DIPHENHYDRAMINE HYDROCHLORIDE 25 MG: 50 INJECTION, SOLUTION INTRAMUSCULAR; INTRAVENOUS at 18:44

## 2019-01-06 RX ADMIN — METOCLOPRAMIDE 10 MG: 5 INJECTION, SOLUTION INTRAMUSCULAR; INTRAVENOUS at 18:44

## 2019-01-06 RX ADMIN — ONDANSETRON 4 MG: 2 INJECTION INTRAMUSCULAR; INTRAVENOUS at 18:43

## 2019-01-06 RX ADMIN — SODIUM CHLORIDE 1000 ML: 9 INJECTION, SOLUTION INTRAVENOUS at 18:44

## 2019-01-06 RX ADMIN — DOXYCYCLINE 100 MG: 100 TABLET ORAL at 21:55

## 2019-01-06 RX ADMIN — LORAZEPAM 1 MG: 2 INJECTION INTRAMUSCULAR at 20:29

## 2019-01-06 RX ADMIN — SODIUM CHLORIDE 1000 ML: 9 INJECTION, SOLUTION INTRAVENOUS at 20:29

## 2019-01-06 RX ADMIN — HEPARIN 500 UNITS: 100 SYRINGE at 22:19

## 2019-01-06 RX ADMIN — LORAZEPAM 0.5 MG: 2 INJECTION INTRAMUSCULAR at 18:43

## 2019-01-06 RX ADMIN — LEVETIRACETAM 1500 MG: 500 TABLET ORAL at 21:55

## 2019-01-06 RX ADMIN — MORPHINE SULFATE 4 MG: 4 INJECTION INTRAVENOUS at 21:55

## 2019-01-06 RX ADMIN — MORPHINE SULFATE 4 MG: 4 INJECTION INTRAVENOUS at 18:44

## 2019-01-06 ASSESSMENT — LIFESTYLE VARIABLES: DO YOU DRINK ALCOHOL: NO

## 2019-01-07 ENCOUNTER — HOSPITAL ENCOUNTER (EMERGENCY)
Facility: MEDICAL CENTER | Age: 49
End: 2019-01-08
Attending: EMERGENCY MEDICINE
Payer: MEDICARE

## 2019-01-07 DIAGNOSIS — G43.109 MIGRAINE WITH AURA AND WITHOUT STATUS MIGRAINOSUS, NOT INTRACTABLE: ICD-10-CM

## 2019-01-07 PROCEDURE — 700105 HCHG RX REV CODE 258: Performed by: EMERGENCY MEDICINE

## 2019-01-07 PROCEDURE — 99284 EMERGENCY DEPT VISIT MOD MDM: CPT

## 2019-01-07 PROCEDURE — 700111 HCHG RX REV CODE 636 W/ 250 OVERRIDE (IP): Performed by: EMERGENCY MEDICINE

## 2019-01-07 PROCEDURE — 94760 N-INVAS EAR/PLS OXIMETRY 1: CPT

## 2019-01-07 PROCEDURE — 96375 TX/PRO/DX INJ NEW DRUG ADDON: CPT

## 2019-01-07 PROCEDURE — 96374 THER/PROPH/DIAG INJ IV PUSH: CPT

## 2019-01-07 RX ORDER — ONDANSETRON 2 MG/ML
4 INJECTION INTRAMUSCULAR; INTRAVENOUS ONCE
Status: COMPLETED | OUTPATIENT
Start: 2019-01-07 | End: 2019-01-07

## 2019-01-07 RX ORDER — HYDROMORPHONE HYDROCHLORIDE 1 MG/ML
1 INJECTION, SOLUTION INTRAMUSCULAR; INTRAVENOUS; SUBCUTANEOUS ONCE
Status: COMPLETED | OUTPATIENT
Start: 2019-01-07 | End: 2019-01-07

## 2019-01-07 RX ORDER — METOCLOPRAMIDE HYDROCHLORIDE 5 MG/ML
10 INJECTION INTRAMUSCULAR; INTRAVENOUS ONCE
Status: COMPLETED | OUTPATIENT
Start: 2019-01-07 | End: 2019-01-07

## 2019-01-07 RX ORDER — DIPHENHYDRAMINE HYDROCHLORIDE 50 MG/ML
25 INJECTION INTRAMUSCULAR; INTRAVENOUS ONCE
Status: COMPLETED | OUTPATIENT
Start: 2019-01-07 | End: 2019-01-07

## 2019-01-07 RX ORDER — KETOROLAC TROMETHAMINE 30 MG/ML
30 INJECTION, SOLUTION INTRAMUSCULAR; INTRAVENOUS ONCE
Status: COMPLETED | OUTPATIENT
Start: 2019-01-07 | End: 2019-01-07

## 2019-01-07 RX ORDER — SODIUM CHLORIDE 9 MG/ML
1000 INJECTION, SOLUTION INTRAVENOUS ONCE
Status: COMPLETED | OUTPATIENT
Start: 2019-01-07 | End: 2019-01-08

## 2019-01-07 RX ORDER — DEXAMETHASONE SODIUM PHOSPHATE 4 MG/ML
10 INJECTION, SOLUTION INTRA-ARTICULAR; INTRALESIONAL; INTRAMUSCULAR; INTRAVENOUS; SOFT TISSUE ONCE
Status: COMPLETED | OUTPATIENT
Start: 2019-01-07 | End: 2019-01-07

## 2019-01-07 RX ADMIN — KETOROLAC TROMETHAMINE 30 MG: 30 INJECTION, SOLUTION INTRAMUSCULAR at 23:45

## 2019-01-07 RX ADMIN — DIPHENHYDRAMINE HYDROCHLORIDE 25 MG: 50 INJECTION INTRAMUSCULAR; INTRAVENOUS at 23:52

## 2019-01-07 RX ADMIN — SODIUM CHLORIDE 1000 ML: 9 INJECTION, SOLUTION INTRAVENOUS at 23:42

## 2019-01-07 RX ADMIN — HYDROMORPHONE HYDROCHLORIDE 1 MG: 1 INJECTION, SOLUTION INTRAMUSCULAR; INTRAVENOUS; SUBCUTANEOUS at 23:51

## 2019-01-07 RX ADMIN — METOCLOPRAMIDE 10 MG: 5 INJECTION, SOLUTION INTRAMUSCULAR; INTRAVENOUS at 23:55

## 2019-01-07 RX ADMIN — ONDANSETRON 4 MG: 2 INJECTION INTRAMUSCULAR; INTRAVENOUS at 23:43

## 2019-01-07 RX ADMIN — DEXAMETHASONE SODIUM PHOSPHATE 10 MG: 4 INJECTION, SOLUTION INTRA-ARTICULAR; INTRALESIONAL; INTRAMUSCULAR; INTRAVENOUS; SOFT TISSUE at 23:48

## 2019-01-07 ASSESSMENT — LIFESTYLE VARIABLES: DO YOU DRINK ALCOHOL: NO

## 2019-01-07 ASSESSMENT — PAIN SCALES - GENERAL: PAINLEVEL_OUTOF10: 9

## 2019-01-07 NOTE — CONSULTS
Called by Dr. Lopez for question of sz med management.     Patient w/ hx of sz and  shunt placement, intractable headaches on Botox, and spells followed by Dr. Ribera presenting to the E.R. With headache as well as single GTC like event during which she had urinary incontinence. She is currently back to baseline and being treated for h/a. Chart review shows recent EMU admission in 2018 during which she had 1 psychogenic spell, and she has some mild cortical dysfunction on R posterior region. She is currently on Keppra 1,000mg BID, and Depakote ER 500mg BID which was recently re-started. She had been on Depakote before but there was concern of liver dysfunction and LFT elevation, so it had been stopped.     Today, labs are remarkable for a depakote level of ~21, and LFT elevations ~200-300. Given the elevation in LFTs, and the recent re-initiation of depakote, it would be safer to increase Keppra dosing in this setting and the patient be followed in clinic for further determination on depakote dosing.     Plan:   -Keppra 1500mg in E.R. Now  -Increase Keppra to 1500mg BID.  -Call office on next work-day for further recommendations by Dr. Ribera

## 2019-01-07 NOTE — DISCHARGE INSTRUCTIONS
Return if you have another seizure lasting longer than 5 minutes, multiple seizures in a row, fever, prolonged confusion, increasing redness, fever, or pus to your wound.

## 2019-01-07 NOTE — ED NOTES
Pt verbalizes understanding of discharge and follow-up instructions.  PIV removed.  Given Rx X2.  VSS.  All questions answered.  Ambulates to discharge in wheelchair.  Pt  to drive pt home.

## 2019-01-07 NOTE — ED NOTES
Connected to cardiac monitor, BP cuff, and continuous pulse ox upon arrival.  Pt in gown, call light in reach, bed in lowest position.  Chart up for ERP.

## 2019-01-07 NOTE — ED NOTES
Pt tachycardic at 125 and tremulous in hands.  Pt appears anxious.  ERP aware.  Orders received.  Pt medicated per mar.

## 2019-01-07 NOTE — ED TRIAGE NOTES
"Chief Complaint   Patient presents with   • Migraine     x 5 days   • Seizure    reports 3 \"mini seizures\" lasting 45 seconds. Pt AOx4.  Open wound to L hand, hx MRSA in a different wound, pt states this wound has not been swabbed yet.     /94   Pulse (!) 128   Temp 36.7 °C (98 °F) (Temporal)   Resp 18   Ht 1.6 m (5' 3\")   Wt 77.1 kg (170 lb)   SpO2 98%   BMI 30.11 kg/m²     Pt Informed regarding triage process and verbalized understanding to inform triage tech or RN for any changes in condition.  Placed in lobby.    "

## 2019-01-08 VITALS
BODY MASS INDEX: 31.8 KG/M2 | OXYGEN SATURATION: 97 % | SYSTOLIC BLOOD PRESSURE: 124 MMHG | HEIGHT: 63 IN | RESPIRATION RATE: 16 BRPM | WEIGHT: 179.45 LBS | DIASTOLIC BLOOD PRESSURE: 77 MMHG | HEART RATE: 97 BPM | TEMPERATURE: 97 F

## 2019-01-08 PROCEDURE — 96375 TX/PRO/DX INJ NEW DRUG ADDON: CPT

## 2019-01-08 PROCEDURE — 700111 HCHG RX REV CODE 636 W/ 250 OVERRIDE (IP): Performed by: EMERGENCY MEDICINE

## 2019-01-08 RX ORDER — MORPHINE SULFATE 4 MG/ML
4 INJECTION, SOLUTION INTRAMUSCULAR; INTRAVENOUS ONCE
Status: COMPLETED | OUTPATIENT
Start: 2019-01-08 | End: 2019-01-08

## 2019-01-08 RX ADMIN — HEPARIN 400 UNITS: 100 SYRINGE at 01:46

## 2019-01-08 RX ADMIN — MORPHINE SULFATE 4 MG: 4 INJECTION INTRAVENOUS at 00:50

## 2019-01-08 ASSESSMENT — PAIN SCALES - GENERAL: PAINLEVEL_OUTOF10: 3

## 2019-01-08 NOTE — ED TRIAGE NOTES
"Chief Complaint   Patient presents with   • Migraine     Blood pressure 126/81, pulse (!) 113, temperature 36.1 °C (97 °F), temperature source Temporal, resp. rate 15, height 1.6 m (5' 3\"), weight 81.4 kg (179 lb 7.3 oz), SpO2 98 %, not currently breastfeeding.      Pt ambulatory to triage with above complaint.  Pt states she was seen at this ED yesterday for migraine and \"seizure like activity.\"  Pt states that the migraine has cont and she has not had any relief.      Pt returned to [facility], educated on triage process, and to inform staff of any changes or concerns.    "

## 2019-01-08 NOTE — ED NOTES
Patient given DC paperwork and instructed to follow up with PCP in 2 days. Patient told to return to the ER for new or worsening symptoms. Patient A&O x4 and verbalizes understanding of instructions. Patient ambulatory with steady gait.

## 2019-01-08 NOTE — ED PROVIDER NOTES
ED Provider Note    Scribed for Mauro Lopez M.D. by Noreen Aldana. 1/7/2019, 11:03 PM.    Primary care provider: Elliott Power M.D.  Means of arrival: walk in  History obtained from: patient  History limited by: none    CHIEF COMPLAINT  Chief Complaint   Patient presents with   • Migraine       HPI  Enedelia Pang is a 48 y.o. female who presents to the Emergency Department for evaluation of a constant headache onset two days ago with increasing severity today. She states that she woke up at 4:00 AM from the pain. She was seen in the ED yesterday for a migraine and seizure like activity. Patient explains that her symptoms have not resolved at all since being seen yesterday. She states that the migraine was first localized to the left side of her head but is now all over. She reports associated photophobia and vomiting. She has had three episodes of vomiting today. She denies any fevers. Patient reports that she has had a history of chronic migraines since she was 14 years old. No alleviating factors mentioned.     REVIEW OF SYSTEMS  Pertinent positives include headache, vomiting, photophobia. Pertinent negatives include fever. All other systems negative.    PAST MEDICAL HISTORY   has a past medical history of Allergy, unspecified not elsewhere classified; Anemia (10/05/2017); Anxiety; autoimmune; Depression; H/O Clostridium difficile infection; MRSA infection; Hydrocephalus; Migraine with aura, with intractable migraine, so stated, without mention of status migrainosus; MRSA (methicillin resistant Staphylococcus aureus); Pituitary abnormality (HCC); Staph infection; and Stroke (HCC).    SURGICAL HISTORY   has a past surgical history that includes cholecystectomy; tonsillectomy; appendectomy; tubal ligation; other; other neurological surg; irrigation & debridement neuro (N/A, 6/28/2015); lumbar exploration (N/A, 8/31/2015); spinal cord stimulator (12/19/2016); and  shunt insertion  "(5/31/2017).    SOCIAL HISTORY  Social History   Substance Use Topics   • Smoking status: Never Smoker   • Smokeless tobacco: Never Used   • Alcohol use Yes      Comment: Rare      History   Drug Use No       FAMILY HISTORY  Family History   Problem Relation Age of Onset   • No Known Problems Mother    • No Known Problems Father        CURRENT MEDICATIONS  Home Medications     Reviewed by Jesús Lou R.N. (Registered Nurse) on 01/07/19 at 2242  Med List Status: Not Addressed   Medication Last Dose Status   aspirin (ASA) 81 MG Chew Tab chewable tablet  Active   Buprenorphine 5 MCG/HR PATCH WEEKLY  Active   Calcium Citrate-Vitamin D (CALCIUM + D PO)  Active   diphenhydrAMINE (BENADRYL) 50 MG/ML Solution  Active   divalproex ER (DEPAKOTE ER) 500 MG TABLET SR 24 HR  Active   doxycycline (MONODOX) 100 MG capsule  Active   duloxetine (CYMBALTA) 60 MG CPEP delayed-release capsule  Active   gabapentin (NEURONTIN) 600 MG tablet  Active   hydroxychloroquine (PLAQUENIL) 200 MG Tab  Active   ketorolac (TORADOL) 60 MG/2ML Solution  Active   levetiracetam (KEPPRA) 1000 MG tablet  Active   levETIRAcetam (KEPPRA) 500 MG Tab  Active   NON SPECIFIED  Active   omeprazole (PRILOSEC) 20 MG delayed-release capsule  Active   predniSONE (DELTASONE) 5 MG Tab  Active   risperiDONE (RISPERDAL) 1 MG Tab  Active                ALLERGIES  Allergies   Allergen Reactions   • Sumatriptan Unspecified     Historical=\"Heart stops.\" Reaction  between 1995 to 1997   • Prochlorperazine Swelling     Tongue swelling. Reaction as a teen. (compazine)  Tolerated Phenergan on 02/24/15   • Vancomycin Shortness of Breath and Rash     Reaction in 2005.  D/W patient 8/31/15 - tolerated loading dose of vancomycin administered on 8/30/15 with some itching to chest with decreased infusion rate. Red Man Syndrome       PHYSICAL EXAM  VITAL SIGNS: /81   Pulse (!) 113   Temp 36.1 °C (97 °F) (Temporal)   Resp 15   Ht 1.6 m (5' 3\")   Wt 81.4 kg (179 lb " 7.3 oz)   LMP  (Within Months)   SpO2 98%   BMI 31.79 kg/m²      Constitutional: Well developed, Well nourished, mild distress.   HENT: Normocephalic, Atraumatic, Oropharynx moist.   Eyes: Conjunctiva normal, No discharge.   Neck: Supple, No stridor.   Cardiovascular: Tachycardic, Normal rhythm, No murmurs, equal pulses.   Pulmonary: Normal breath sounds, No respiratory distress, No wheezing, No rales, No rhonchi.  Chest: No chest wall tenderness or deformity.   Abdomen:Soft, No tenderness, No masses, no rebound, no guarding.   Back: No CVA tenderness.   Musculoskeletal: No major deformities noted, No tenderness.   Skin: Warm, Dry, No erythema, No rash. She has a sore on her hand that is slightly less red from yesterday  Neurologic: Alert & oriented x 3, Normal motor function,  No focal deficits noted. Normal finger to nose, Normal cranial nerves II-XII, No pronator drift. Equal strength in upper and lower extremities bilaterally.  Psychiatric: Affect normal, Judgment normal, Mood normal.         COURSE & MEDICAL DECISION MAKING  Pertinent Labs & Imaging studies reviewed. (See chart for details)    11:03 PM - Patient seen and examined at bedside. Patient will be treated with IV fluids, 30 mg Toradol, 1 mg dilaudid, 4 mg Zofran, 10 mg Reglan, 25 mg benadryl, 10 mg decadron. The patient will receive IV fluids for migraine treatment. Explained that she will not need any more imaging or labs as her results were normal yesterday. The differential diagnoses include but are not limited to: migraine, complex migraine.        Patient feels much better after medications and IV fluids.  Her headache is gone away.  At this point time she was given Decadron to prevent rebound headache.    Medical Decision Making: At this point time I think the patient most likely has her migraine.  This feels like her previous migraine.  She was seen here by myself yesterday and had a significant workup including CT and labs.  I do not think  any of those need to be repeated today.  I gave the patient Decadron to help prevent rebound headache.  Patient sees a neurologist and has chronic headaches since age of 14.  At this point time I do not think any further lab values are necessary or imaging.     The patient will return for new or worsening symptoms and is stable at the time of discharge.    The patient is referred to a primary physician for blood pressure management, diabetic screening, and for all other preventative health concerns.        DISPOSITION:  Patient will be discharged home in stable condition.    FOLLOW UP:  Drew Ribera M.D.  [address] [place] [id number]  [phone]    Schedule an appointment as soon as possible for a visit         OUTPATIENT MEDICATIONS:  New Prescriptions    No medications on file   \      FINAL IMPRESSION  1. Migraine with aura and without status migrainosus, not intractable          Noreen MADERA (Victorinoibadolfo), am scribing for, and in the presence of, Mauro Lopez M.D.    Electronically signed by: Noreen Aldana (Rob), 1/7/2019    Mauro MADERA M.D. personally performed the services described in this documentation, as scribed by Noreen Aldana in my presence, and it is both accurate and complete. C    The note accurately reflects work and decisions made by me.  Mauro Lopez  1/8/2019  1:20 AM

## 2019-01-08 NOTE — ED NOTES
Patient right subclavian power port accessed with 20 g 0.75 inch power port using sterile technique. Patient tolerated procedure well. Power port secured in place with transparent dressing. Port flushes easily and blood return noted as well. Patient medicated per orders. Patient resting in room with family at bedside. Lights off for comfort. NAD. VSS.

## 2019-01-09 ENCOUNTER — PATIENT OUTREACH (OUTPATIENT)
Dept: HEALTH INFORMATION MANAGEMENT | Facility: OTHER | Age: 49
End: 2019-01-09

## 2019-01-12 ENCOUNTER — HOSPITAL ENCOUNTER (EMERGENCY)
Facility: MEDICAL CENTER | Age: 49
End: 2019-01-13
Attending: EMERGENCY MEDICINE
Payer: MEDICARE

## 2019-01-12 DIAGNOSIS — G43.809 OTHER MIGRAINE WITHOUT STATUS MIGRAINOSUS, NOT INTRACTABLE: ICD-10-CM

## 2019-01-12 PROCEDURE — 99285 EMERGENCY DEPT VISIT HI MDM: CPT

## 2019-01-12 RX ORDER — METOCLOPRAMIDE HYDROCHLORIDE 5 MG/ML
10 INJECTION INTRAMUSCULAR; INTRAVENOUS ONCE
Status: COMPLETED | OUTPATIENT
Start: 2019-01-13 | End: 2019-01-13

## 2019-01-12 RX ORDER — SODIUM CHLORIDE 9 MG/ML
1000 INJECTION, SOLUTION INTRAVENOUS ONCE
Status: COMPLETED | OUTPATIENT
Start: 2019-01-13 | End: 2019-01-13

## 2019-01-12 RX ORDER — DIPHENHYDRAMINE HYDROCHLORIDE 50 MG/ML
50 INJECTION INTRAMUSCULAR; INTRAVENOUS ONCE
Status: COMPLETED | OUTPATIENT
Start: 2019-01-13 | End: 2019-01-13

## 2019-01-12 RX ORDER — KETOROLAC TROMETHAMINE 30 MG/ML
15 INJECTION, SOLUTION INTRAMUSCULAR; INTRAVENOUS ONCE
Status: COMPLETED | OUTPATIENT
Start: 2019-01-13 | End: 2019-01-13

## 2019-01-12 ASSESSMENT — PAIN SCALES - GENERAL: PAINLEVEL_OUTOF10: 8

## 2019-01-13 VITALS
BODY MASS INDEX: 30.12 KG/M2 | HEART RATE: 114 BPM | HEIGHT: 63 IN | TEMPERATURE: 97.7 F | SYSTOLIC BLOOD PRESSURE: 145 MMHG | WEIGHT: 170 LBS | RESPIRATION RATE: 21 BRPM | DIASTOLIC BLOOD PRESSURE: 99 MMHG | OXYGEN SATURATION: 96 %

## 2019-01-13 LAB
ALBUMIN SERPL BCP-MCNC: 4.3 G/DL (ref 3.2–4.9)
ALBUMIN/GLOB SERPL: 1.7 G/DL
ALP SERPL-CCNC: 114 U/L (ref 30–99)
ALT SERPL-CCNC: 72 U/L (ref 2–50)
ANION GAP SERPL CALC-SCNC: 11 MMOL/L (ref 0–11.9)
APPEARANCE UR: CLEAR
AST SERPL-CCNC: 16 U/L (ref 12–45)
BACTERIA #/AREA URNS HPF: ABNORMAL /HPF
BASOPHILS # BLD AUTO: 0.4 % (ref 0–1.8)
BASOPHILS # BLD: 0.04 K/UL (ref 0–0.12)
BILIRUB SERPL-MCNC: 0.6 MG/DL (ref 0.1–1.5)
BILIRUB UR QL STRIP.AUTO: NEGATIVE
BUN SERPL-MCNC: 7 MG/DL (ref 8–22)
CALCIUM SERPL-MCNC: 8.8 MG/DL (ref 8.5–10.5)
CHLORIDE SERPL-SCNC: 101 MMOL/L (ref 96–112)
CO2 SERPL-SCNC: 22 MMOL/L (ref 20–33)
COLOR UR: YELLOW
CREAT SERPL-MCNC: 0.6 MG/DL (ref 0.5–1.4)
EOSINOPHIL # BLD AUTO: 0.01 K/UL (ref 0–0.51)
EOSINOPHIL NFR BLD: 0.1 % (ref 0–6.9)
EPI CELLS #/AREA URNS HPF: ABNORMAL /HPF
ERYTHROCYTE [DISTWIDTH] IN BLOOD BY AUTOMATED COUNT: 45.1 FL (ref 35.9–50)
GLOBULIN SER CALC-MCNC: 2.5 G/DL (ref 1.9–3.5)
GLUCOSE SERPL-MCNC: 85 MG/DL (ref 65–99)
GLUCOSE UR STRIP.AUTO-MCNC: NEGATIVE MG/DL
HCG SERPL QL: NEGATIVE
HCT VFR BLD AUTO: 41.9 % (ref 37–47)
HGB BLD-MCNC: 14.4 G/DL (ref 12–16)
HYALINE CASTS #/AREA URNS LPF: ABNORMAL /LPF
IMM GRANULOCYTES # BLD AUTO: 0.06 K/UL (ref 0–0.11)
IMM GRANULOCYTES NFR BLD AUTO: 0.6 % (ref 0–0.9)
INR PPP: 1.1 (ref 0.87–1.13)
KETONES UR STRIP.AUTO-MCNC: NEGATIVE MG/DL
LEUKOCYTE ESTERASE UR QL STRIP.AUTO: NEGATIVE
LIPASE SERPL-CCNC: 9 U/L (ref 11–82)
LYMPHOCYTES # BLD AUTO: 1.84 K/UL (ref 1–4.8)
LYMPHOCYTES NFR BLD: 19.3 % (ref 22–41)
MCH RBC QN AUTO: 32.2 PG (ref 27–33)
MCHC RBC AUTO-ENTMCNC: 34.4 G/DL (ref 33.6–35)
MCV RBC AUTO: 93.7 FL (ref 81.4–97.8)
MICRO URNS: ABNORMAL
MONOCYTES # BLD AUTO: 0.75 K/UL (ref 0–0.85)
MONOCYTES NFR BLD AUTO: 7.9 % (ref 0–13.4)
NEUTROPHILS # BLD AUTO: 6.83 K/UL (ref 2–7.15)
NEUTROPHILS NFR BLD: 71.7 % (ref 44–72)
NITRITE UR QL STRIP.AUTO: NEGATIVE
NRBC # BLD AUTO: 0 K/UL
NRBC BLD-RTO: 0 /100 WBC
PH UR STRIP.AUTO: 6.5 [PH]
PLATELET # BLD AUTO: 386 K/UL (ref 164–446)
PMV BLD AUTO: 9.2 FL (ref 9–12.9)
POTASSIUM SERPL-SCNC: 3.4 MMOL/L (ref 3.6–5.5)
PROT SERPL-MCNC: 6.8 G/DL (ref 6–8.2)
PROT UR QL STRIP: NEGATIVE MG/DL
PROTHROMBIN TIME: 14.3 SEC (ref 12–14.6)
RBC # BLD AUTO: 4.47 M/UL (ref 4.2–5.4)
RBC # URNS HPF: ABNORMAL /HPF
RBC UR QL AUTO: ABNORMAL
SODIUM SERPL-SCNC: 134 MMOL/L (ref 135–145)
SP GR UR STRIP.AUTO: 1.01
UROBILINOGEN UR STRIP.AUTO-MCNC: 0.2 MG/DL
VALPROATE SERPL-MCNC: 34.4 UG/ML (ref 50–100)
WBC # BLD AUTO: 9.5 K/UL (ref 4.8–10.8)
WBC #/AREA URNS HPF: ABNORMAL /HPF

## 2019-01-13 PROCEDURE — 81001 URINALYSIS AUTO W/SCOPE: CPT

## 2019-01-13 PROCEDURE — 80164 ASSAY DIPROPYLACETIC ACD TOT: CPT

## 2019-01-13 PROCEDURE — 96374 THER/PROPH/DIAG INJ IV PUSH: CPT

## 2019-01-13 PROCEDURE — 85025 COMPLETE CBC W/AUTO DIFF WBC: CPT

## 2019-01-13 PROCEDURE — 96376 TX/PRO/DX INJ SAME DRUG ADON: CPT

## 2019-01-13 PROCEDURE — 84703 CHORIONIC GONADOTROPIN ASSAY: CPT

## 2019-01-13 PROCEDURE — 85610 PROTHROMBIN TIME: CPT

## 2019-01-13 PROCEDURE — 80053 COMPREHEN METABOLIC PANEL: CPT

## 2019-01-13 PROCEDURE — 700105 HCHG RX REV CODE 258: Performed by: EMERGENCY MEDICINE

## 2019-01-13 PROCEDURE — 83690 ASSAY OF LIPASE: CPT

## 2019-01-13 PROCEDURE — 36415 COLL VENOUS BLD VENIPUNCTURE: CPT

## 2019-01-13 PROCEDURE — 700111 HCHG RX REV CODE 636 W/ 250 OVERRIDE (IP): Performed by: EMERGENCY MEDICINE

## 2019-01-13 PROCEDURE — 96375 TX/PRO/DX INJ NEW DRUG ADDON: CPT

## 2019-01-13 RX ORDER — VALPROATE SODIUM 100 MG/ML
500 INJECTION INTRAVENOUS ONCE
Status: COMPLETED | OUTPATIENT
Start: 2019-01-13 | End: 2019-01-13

## 2019-01-13 RX ORDER — DIPHENHYDRAMINE HYDROCHLORIDE 50 MG/ML
25 INJECTION INTRAMUSCULAR; INTRAVENOUS ONCE
Status: COMPLETED | OUTPATIENT
Start: 2019-01-13 | End: 2019-01-13

## 2019-01-13 RX ORDER — HYDROMORPHONE HYDROCHLORIDE 1 MG/ML
1 INJECTION, SOLUTION INTRAMUSCULAR; INTRAVENOUS; SUBCUTANEOUS ONCE
Status: COMPLETED | OUTPATIENT
Start: 2019-01-13 | End: 2019-01-13

## 2019-01-13 RX ADMIN — Medication 500 UNITS: at 04:05

## 2019-01-13 RX ADMIN — METOCLOPRAMIDE 10 MG: 5 INJECTION, SOLUTION INTRAMUSCULAR; INTRAVENOUS at 00:26

## 2019-01-13 RX ADMIN — DIPHENHYDRAMINE HYDROCHLORIDE 25 MG: 50 INJECTION INTRAMUSCULAR; INTRAVENOUS at 03:55

## 2019-01-13 RX ADMIN — DIPHENHYDRAMINE HYDROCHLORIDE 50 MG: 50 INJECTION, SOLUTION INTRAMUSCULAR; INTRAVENOUS at 00:26

## 2019-01-13 RX ADMIN — VALPROATE SODIUM 500 MG: 100 INJECTION, SOLUTION INTRAVENOUS at 03:00

## 2019-01-13 RX ADMIN — KETOROLAC TROMETHAMINE 15 MG: 30 INJECTION, SOLUTION INTRAMUSCULAR; INTRAVENOUS at 00:26

## 2019-01-13 RX ADMIN — SODIUM CHLORIDE 1000 ML: 9 INJECTION, SOLUTION INTRAVENOUS at 00:27

## 2019-01-13 RX ADMIN — HYDROMORPHONE HYDROCHLORIDE 1 MG: 1 INJECTION, SOLUTION INTRAMUSCULAR; INTRAVENOUS; SUBCUTANEOUS at 01:23

## 2019-01-13 NOTE — ED PROVIDER NOTES
"ED Provider Note    CHIEF COMPLAINT  Chief Complaint   Patient presents with   • Migraine     \"I've had a migraine for 8 days and it hasn't gone away, it feels like my brain is being shocked.\" Seen on 1/6 and 1/7 for same. Has a hx of migraines and sees neurologist, has appt in february.    • Weakness     \"my legs feel like jelly.\"        HPI  Enedelia Pang is a 48 y.o. female who presents with about 8 days.  Has been here 2 other times for similar symptoms on the sixth and seventh.  Since then, she has also seen a neurologist for Botox injections.  She is on multiple medications and has a very frequent intractable headaches.    Last CT of the head was done on 1/6/2019 that was unremarkable.  No fevers or vomiting.  She has generalized weakness however no focal weakness.    She states that prior visits has gotten her headaches down to a level 4 of 10 typically.  On a typical day however she it is not uncommon to have a headache of 3 out of 10.  Due to her chronic headaches and as of yet undiagnosed autoimmune process, the patient does have a history of a right upper chest [employer].    Headache is on the right side of the head where she typically has headache.  States that it is behind the eye.  No fevers or recent illness.  No medications have worked on an outpatient basis to help including outpatient Botox.    REVIEW OF SYSTEMS  See HPI for further details. All other systems are negative.     PAST MEDICAL HISTORY   has a past medical history of Allergy, unspecified not elsewhere classified; Anemia (10/05/2017); Anxiety; autoimmune; Depression; H/O Clostridium difficile infection; MRSA infection; Hydrocephalus; Migraine with aura, with intractable migraine, so stated, without mention of status migrainosus; MRSA (methicillin resistant Staphylococcus aureus); Pituitary abnormality (HCC); Staph infection; and Stroke (HCC).    SOCIAL HISTORY  Social History     Social History Main Topics   • Smoking status: Never " "Smoker   • Smokeless tobacco: Never Used   • Alcohol use Yes      Comment: Rare   • Drug use: No   • Sexual activity: Not on file       SURGICAL HISTORY   has a past surgical history that includes cholecystectomy; tonsillectomy; appendectomy; tubal ligation; other; other neurological surg; irrigation & debridement neuro (N/A, 6/28/2015); lumbar exploration (N/A, 8/31/2015); spinal cord stimulator (12/19/2016); and  shunt insertion (5/31/2017).    CURRENT MEDICATIONS  Home Medications     Reviewed by Marsha Palma R.N. (Registered Nurse) on 01/12/19 at 2341  Med List Status: Partial   Medication Last Dose Status   aspirin (ASA) 81 MG Chew Tab chewable tablet  Active   Buprenorphine 5 MCG/HR PATCH WEEKLY  Active   Calcium Citrate-Vitamin D (CALCIUM + D PO)  Active   diphenhydrAMINE (BENADRYL) 50 MG/ML Solution  Active   divalproex ER (DEPAKOTE ER) 500 MG TABLET SR 24 HR  Active   doxycycline (MONODOX) 100 MG capsule  Active   duloxetine (CYMBALTA) 60 MG CPEP delayed-release capsule  Active   gabapentin (NEURONTIN) 600 MG tablet  Active   hydroxychloroquine (PLAQUENIL) 200 MG Tab  Active   ketorolac (TORADOL) 60 MG/2ML Solution  Active   levetiracetam (KEPPRA) 1000 MG tablet  Active   levETIRAcetam (KEPPRA) 500 MG Tab  Active   NON SPECIFIED  Active   omeprazole (PRILOSEC) 20 MG delayed-release capsule  Active   predniSONE (DELTASONE) 5 MG Tab  Active   risperiDONE (RISPERDAL) 1 MG Tab  Active                ALLERGIES  Allergies   Allergen Reactions   • Sumatriptan Unspecified     Historical=\"Heart stops.\" Reaction  between 1995 to 1997   • Prochlorperazine Swelling     Tongue swelling. Reaction as a teen. (compazine)  Tolerated Phenergan on 02/24/15   • Vancomycin Shortness of Breath and Rash     Reaction in 2005.  D/W patient 8/31/15 - tolerated loading dose of vancomycin administered on 8/30/15 with some itching to chest with decreased infusion rate. Red Man Syndrome       PHYSICAL EXAM  VITAL SIGNS: BP " "145/99   Pulse (!) 126   Temp 36.5 °C (97.7 °F) (Temporal)   Resp 17   Ht 1.6 m (5' 3\")   Wt 77.1 kg (170 lb)   LMP 10/17/2018 (Approximate)   SpO2 99%   BMI 30.11 kg/m²   Pulse ox interpretation: I interpret this pulse ox as normal.  Constitutional: Alert in no apparent distress.  HENT: No signs of trauma, Bilateral external ears normal, Nose normal.   Eyes: Pupils are equal and reactive, Conjunctiva normal, Non-icteric.   Neck: Normal range of motion, No tenderness, Supple, No stridor.   Cardiovascular: Regular rate and rhythm, no murmurs.   Thorax & Lungs: Normal breath sounds, No respiratory distress, No wheezing, No chest tenderness.   Abdomen: Bowel sounds normal, Soft, No tenderness, No masses, No pulsatile masses. No peritoneal signs.  Skin: Warm, Dry, No erythema, No rash.   Back: No bony tenderness, No CVA tenderness.   Extremities: Intact distal pulses, No edema, No tenderness, No cyanosis  Musculoskeletal: Good range of motion in all major joints. No tenderness to palpation or major deformities noted.   Neurologic: Alert , Normal motor function and gait, Normal sensory function, No focal deficits noted.   Psychiatric: Affect normal, Judgment normal, Mood normal.       DIAGNOSTIC STUDIES / PROCEDURES      LABS  Labs Reviewed   CBC WITH DIFFERENTIAL - Abnormal; Notable for the following:        Result Value    Lymphocytes 19.30 (*)     All other components within normal limits    Narrative:     Indicate which anticoagulants the patient is on:->NONE   COMP METABOLIC PANEL - Abnormal; Notable for the following:     Sodium 134 (*)     Potassium 3.4 (*)     Bun 7 (*)     ALT(SGPT) 72 (*)     Alkaline Phosphatase 114 (*)     All other components within normal limits    Narrative:     Indicate which anticoagulants the patient is on:->NONE   LIPASE - Abnormal; Notable for the following:     Lipase 9 (*)     All other components within normal limits    Narrative:     Indicate which anticoagulants the " patient is on:->NONE   URINALYSIS - Abnormal; Notable for the following:     Occult Blood Moderate (*)     All other components within normal limits   VALPROIC ACID - Abnormal; Notable for the following:     Valproic Acid 34.4 (*)     All other components within normal limits    Narrative:     Indicate which anticoagulants the patient is on:->NONE   URINE MICROSCOPIC (W/UA) - Abnormal; Notable for the following:     Bacteria Few (*)     All other components within normal limits   PROTHROMBIN TIME    Narrative:     Indicate which anticoagulants the patient is on:->NONE   HCG QUAL SERUM    Narrative:     Indicate which anticoagulants the patient is on:->NONE   ESTIMATED GFR    Narrative:     Indicate which anticoagulants the patient is on:->NONE       RADIOLOGY  No orders to display         COURSE & MEDICAL DECISION MAKING  Pertinent Labs & Imaging studies reviewed. (See chart for details)  48 y.o. female presenting with continued headaches.  Has been ongoing for the past week.  Has a history of chronic headaches and gets them very frequently.  Has not been improved with any outpatient medication regimens including Botox.    She was given typical migraine therapy upon arrival including IV fluid hydration, Reglan, Benadryl.  Continues to have headaches.  She states that Dilaudid and Depakote have been helpful in the past for headaches.  She is subtherapeutic on Depakote.  Laboratory studies are overall unremarkable.  No leukocytosis or metabolic acidosis.  Has improvement in liver enzymes from prior studies.    After Dilaudid, the patient did feel slightly improved.  She is requesting further treatment with Depakote.  This does appear to be reasonable as this is a chronic medication for this patient and she is subtherapeutic on laboratory studies.    Upon reevaluation, the patient reports feeling much improved overall.  No focal neurologic deficits.  Does have a neurology appointment already scheduled in the next 2-3  weeks.  Instructed to return immediately for any worsening of her symptoms or development of any other concerning signs or symptoms.    The patient will not drink alcohol nor drive with prescribed medications.   The patient will return for worsening symptoms or failure of improvement and is stable at the time of discharge. The patient verbalizes understanding in their own words.      The patient was referred to primary care where they will receive further BP management.    Elliott Power M.D.  [address]  [facility] [address]  [phone]    Schedule an appointment as soon as possible for a visit       [facility], Emergency Dept  [facility] [facility] [address] [phone]    As needed, If symptoms worsen      FINAL IMPRESSION  1. Other migraine without status migrainosus, not intractable            Electronically signed by: Eric Smith, 1/12/2019 11:50 PM

## 2019-01-13 NOTE — ED NOTES
Pt provided with discharge instructions and education. Verbalizes understanding. Denies any questions or concerns at this time. Taken to lobby by  via wheelchair.

## 2019-01-13 NOTE — ED NOTES
Pt up to restroom via wheelchair. Pt slightly tremulous, but completes all transfers without weight bearing assistance. Urine sample collected and sent. Returned to bed. Pt denies any needs or concerns at this time.

## 2019-01-13 NOTE — ED TRIAGE NOTES
"Chief Complaint   Patient presents with   • Migraine     \"I've had a migraine for 8 days and it hasn't gone away, it feels like my brain is being shocked.\" Seen on 1/6 and 1/7 for same. Has a hx of migraines and sees neurologist, has appt in february.    • Weakness     \"my legs feel like jelly.\"        /99   Pulse (!) 126   Temp 36.5 °C (97.7 °F) (Temporal)   Resp 17   Ht 1.6 m (5' 3\")   Wt 77.1 kg (170 lb)   LMP 10/17/2018 (Approximate)   SpO2 99%   BMI 30.11 kg/m²     Pt to triage via WC, refused weight d/t weakness. Per last notes pt felt better after IVF and decadron. Had CT and lab work up as well. States she has returned as headache has not gone away. Does not appear in distress at this time. Pt to senior lounge, instructed to notify staff of worsening concerns.   "

## 2019-01-17 ENCOUNTER — HOSPITAL ENCOUNTER (OUTPATIENT)
Facility: MEDICAL CENTER | Age: 49
End: 2019-01-19
Attending: EMERGENCY MEDICINE | Admitting: HOSPITALIST
Payer: MEDICARE

## 2019-01-17 ENCOUNTER — APPOINTMENT (OUTPATIENT)
Dept: RADIOLOGY | Facility: MEDICAL CENTER | Age: 49
End: 2019-01-17
Attending: EMERGENCY MEDICINE
Payer: MEDICARE

## 2019-01-17 DIAGNOSIS — G43.911 INTRACTABLE MIGRAINE WITH STATUS MIGRAINOSUS, UNSPECIFIED MIGRAINE TYPE: ICD-10-CM

## 2019-01-17 DIAGNOSIS — Z79.899 ENCOUNTER FOR LONG-TERM (CURRENT) USE OF HIGH-RISK MEDICATION: ICD-10-CM

## 2019-01-17 DIAGNOSIS — Z79.52 CURRENT CHRONIC USE OF SYSTEMIC STEROIDS: ICD-10-CM

## 2019-01-17 PROCEDURE — 99285 EMERGENCY DEPT VISIT HI MDM: CPT

## 2019-01-17 RX ORDER — METOCLOPRAMIDE HYDROCHLORIDE 5 MG/ML
10 INJECTION INTRAMUSCULAR; INTRAVENOUS ONCE
Status: COMPLETED | OUTPATIENT
Start: 2019-01-17 | End: 2019-01-18

## 2019-01-17 RX ORDER — SODIUM CHLORIDE 9 MG/ML
2000 INJECTION, SOLUTION INTRAVENOUS ONCE
Status: COMPLETED | OUTPATIENT
Start: 2019-01-17 | End: 2019-01-18

## 2019-01-17 RX ORDER — VALPROATE SODIUM 100 MG/ML
500 INJECTION INTRAVENOUS ONCE
Status: DISCONTINUED | OUTPATIENT
Start: 2019-01-17 | End: 2019-01-18

## 2019-01-17 RX ORDER — KETOROLAC TROMETHAMINE 30 MG/ML
15 INJECTION, SOLUTION INTRAMUSCULAR; INTRAVENOUS ONCE
Status: COMPLETED | OUTPATIENT
Start: 2019-01-18 | End: 2019-01-18

## 2019-01-17 RX ORDER — DIPHENHYDRAMINE HCL 25 MG
25 TABLET ORAL ONCE
Status: COMPLETED | OUTPATIENT
Start: 2019-01-17 | End: 2019-01-18

## 2019-01-17 ASSESSMENT — PAIN SCALES - GENERAL: PAINLEVEL_OUTOF10: 9

## 2019-01-18 ENCOUNTER — APPOINTMENT (OUTPATIENT)
Dept: RADIOLOGY | Facility: MEDICAL CENTER | Age: 49
End: 2019-01-18
Attending: EMERGENCY MEDICINE
Payer: MEDICARE

## 2019-01-18 ENCOUNTER — PATIENT OUTREACH (OUTPATIENT)
Dept: HEALTH INFORMATION MANAGEMENT | Facility: OTHER | Age: 49
End: 2019-01-18

## 2019-01-18 PROBLEM — E66.9 CLASS 1 OBESITY IN ADULT: Status: ACTIVE | Noted: 2019-01-18

## 2019-01-18 PROBLEM — R79.89 ABNORMAL LFTS: Status: ACTIVE | Noted: 2019-01-18

## 2019-01-18 PROBLEM — L98.9 SKIN LESIONS: Status: ACTIVE | Noted: 2019-01-18

## 2019-01-18 PROBLEM — E66.811 CLASS 1 OBESITY IN ADULT: Status: ACTIVE | Noted: 2019-01-18

## 2019-01-18 PROBLEM — G93.2 BENIGN INTRACRANIAL HYPERTENSION: Status: ACTIVE | Noted: 2017-06-14

## 2019-01-18 LAB
ALBUMIN SERPL BCP-MCNC: 4.1 G/DL (ref 3.2–4.9)
ALBUMIN/GLOB SERPL: 1.7 G/DL
ALP SERPL-CCNC: 206 U/L (ref 30–99)
ALT SERPL-CCNC: 751 U/L (ref 2–50)
AMMONIA PLAS-SCNC: 57 UMOL/L (ref 11–45)
ANION GAP SERPL CALC-SCNC: 5 MMOL/L (ref 0–11.9)
APPEARANCE UR: CLEAR
AST SERPL-CCNC: 566 U/L (ref 12–45)
BACTERIA #/AREA URNS HPF: ABNORMAL /HPF
BASOPHILS # BLD AUTO: 0.3 % (ref 0–1.8)
BASOPHILS # BLD: 0.02 K/UL (ref 0–0.12)
BILIRUB SERPL-MCNC: 1 MG/DL (ref 0.1–1.5)
BILIRUB UR QL STRIP.AUTO: NEGATIVE
BUN SERPL-MCNC: 6 MG/DL (ref 8–22)
CA-I SERPL-SCNC: 1.2 MMOL/L (ref 1.1–1.3)
CALCIUM SERPL-MCNC: 9.3 MG/DL (ref 8.5–10.5)
CHLORIDE SERPL-SCNC: 106 MMOL/L (ref 96–112)
CO2 SERPL-SCNC: 24 MMOL/L (ref 20–33)
COLOR UR: YELLOW
CREAT SERPL-MCNC: 0.61 MG/DL (ref 0.5–1.4)
EKG IMPRESSION: NORMAL
EOSINOPHIL # BLD AUTO: 0.02 K/UL (ref 0–0.51)
EOSINOPHIL NFR BLD: 0.3 % (ref 0–6.9)
EPI CELLS #/AREA URNS HPF: ABNORMAL /HPF
ERYTHROCYTE [DISTWIDTH] IN BLOOD BY AUTOMATED COUNT: 45.6 FL (ref 35.9–50)
EST. AVERAGE GLUCOSE BLD GHB EST-MCNC: 88 MG/DL
GLOBULIN SER CALC-MCNC: 2.4 G/DL (ref 1.9–3.5)
GLUCOSE SERPL-MCNC: 70 MG/DL (ref 65–99)
GLUCOSE UR STRIP.AUTO-MCNC: NEGATIVE MG/DL
HBA1C MFR BLD: 4.7 % (ref 0–5.6)
HCT VFR BLD AUTO: 39.9 % (ref 37–47)
HGB BLD-MCNC: 13.5 G/DL (ref 12–16)
IMM GRANULOCYTES # BLD AUTO: 0.02 K/UL (ref 0–0.11)
IMM GRANULOCYTES NFR BLD AUTO: 0.3 % (ref 0–0.9)
KETONES UR STRIP.AUTO-MCNC: NEGATIVE MG/DL
LEUKOCYTE ESTERASE UR QL STRIP.AUTO: ABNORMAL
LIPASE SERPL-CCNC: 20 U/L (ref 11–82)
LYMPHOCYTES # BLD AUTO: 1.04 K/UL (ref 1–4.8)
LYMPHOCYTES NFR BLD: 17 % (ref 22–41)
MAGNESIUM SERPL-MCNC: 1.7 MG/DL (ref 1.5–2.5)
MCH RBC QN AUTO: 32.1 PG (ref 27–33)
MCHC RBC AUTO-ENTMCNC: 33.8 G/DL (ref 33.6–35)
MCV RBC AUTO: 95 FL (ref 81.4–97.8)
MICRO URNS: ABNORMAL
MONOCYTES # BLD AUTO: 0.69 K/UL (ref 0–0.85)
MONOCYTES NFR BLD AUTO: 11.3 % (ref 0–13.4)
NEUTROPHILS # BLD AUTO: 4.34 K/UL (ref 2–7.15)
NEUTROPHILS NFR BLD: 70.8 % (ref 44–72)
NITRITE UR QL STRIP.AUTO: NEGATIVE
NRBC # BLD AUTO: 0 K/UL
NRBC BLD-RTO: 0 /100 WBC
PH UR STRIP.AUTO: 6.5 [PH]
PLATELET # BLD AUTO: 335 K/UL (ref 164–446)
PMV BLD AUTO: 10.3 FL (ref 9–12.9)
POTASSIUM SERPL-SCNC: 3.3 MMOL/L (ref 3.6–5.5)
PROT SERPL-MCNC: 6.5 G/DL (ref 6–8.2)
PROT UR QL STRIP: NEGATIVE MG/DL
RBC # BLD AUTO: 4.2 M/UL (ref 4.2–5.4)
RBC # URNS HPF: ABNORMAL /HPF
RBC UR QL AUTO: ABNORMAL
SODIUM SERPL-SCNC: 135 MMOL/L (ref 135–145)
SP GR UR STRIP.AUTO: 1
UROBILINOGEN UR STRIP.AUTO-MCNC: 0.2 MG/DL
VALPROATE SERPL-MCNC: 12.5 UG/ML (ref 50–100)
WBC # BLD AUTO: 6.1 K/UL (ref 4.8–10.8)
WBC #/AREA URNS HPF: ABNORMAL /HPF

## 2019-01-18 PROCEDURE — 700102 HCHG RX REV CODE 250 W/ 637 OVERRIDE(OP): Performed by: NURSE PRACTITIONER

## 2019-01-18 PROCEDURE — 70551 MRI BRAIN STEM W/O DYE: CPT

## 2019-01-18 PROCEDURE — 700111 HCHG RX REV CODE 636 W/ 250 OVERRIDE (IP): Performed by: NURSE PRACTITIONER

## 2019-01-18 PROCEDURE — 83735 ASSAY OF MAGNESIUM: CPT

## 2019-01-18 PROCEDURE — 96365 THER/PROPH/DIAG IV INF INIT: CPT

## 2019-01-18 PROCEDURE — 99220 PR INITIAL OBSERVATION CARE,LEVL III: CPT | Performed by: HOSPITALIST

## 2019-01-18 PROCEDURE — 700102 HCHG RX REV CODE 250 W/ 637 OVERRIDE(OP): Performed by: EMERGENCY MEDICINE

## 2019-01-18 PROCEDURE — A9270 NON-COVERED ITEM OR SERVICE: HCPCS | Performed by: EMERGENCY MEDICINE

## 2019-01-18 PROCEDURE — 96376 TX/PRO/DX INJ SAME DRUG ADON: CPT

## 2019-01-18 PROCEDURE — 700111 HCHG RX REV CODE 636 W/ 250 OVERRIDE (IP): Performed by: EMERGENCY MEDICINE

## 2019-01-18 PROCEDURE — 83690 ASSAY OF LIPASE: CPT

## 2019-01-18 PROCEDURE — 700102 HCHG RX REV CODE 250 W/ 637 OVERRIDE(OP): Performed by: HOSPITALIST

## 2019-01-18 PROCEDURE — G0378 HOSPITAL OBSERVATION PER HR: HCPCS

## 2019-01-18 PROCEDURE — 94760 N-INVAS EAR/PLS OXIMETRY 1: CPT

## 2019-01-18 PROCEDURE — 96375 TX/PRO/DX INJ NEW DRUG ADDON: CPT

## 2019-01-18 PROCEDURE — 700111 HCHG RX REV CODE 636 W/ 250 OVERRIDE (IP): Performed by: HOSPITALIST

## 2019-01-18 PROCEDURE — A9270 NON-COVERED ITEM OR SERVICE: HCPCS | Performed by: HOSPITALIST

## 2019-01-18 PROCEDURE — 700105 HCHG RX REV CODE 258: Performed by: HOSPITALIST

## 2019-01-18 PROCEDURE — 80053 COMPREHEN METABOLIC PANEL: CPT

## 2019-01-18 PROCEDURE — 71045 X-RAY EXAM CHEST 1 VIEW: CPT

## 2019-01-18 PROCEDURE — 80164 ASSAY DIPROPYLACETIC ACD TOT: CPT

## 2019-01-18 PROCEDURE — 700101 HCHG RX REV CODE 250: Performed by: EMERGENCY MEDICINE

## 2019-01-18 PROCEDURE — 93005 ELECTROCARDIOGRAM TRACING: CPT | Performed by: NURSE PRACTITIONER

## 2019-01-18 PROCEDURE — 93010 ELECTROCARDIOGRAM REPORT: CPT | Performed by: INTERNAL MEDICINE

## 2019-01-18 PROCEDURE — 85025 COMPLETE CBC W/AUTO DIFF WBC: CPT

## 2019-01-18 PROCEDURE — 306578 KIT,PORT ACCESS 20G X 1.5INCH: Performed by: HOSPITALIST

## 2019-01-18 PROCEDURE — 700105 HCHG RX REV CODE 258: Performed by: EMERGENCY MEDICINE

## 2019-01-18 PROCEDURE — 82330 ASSAY OF CALCIUM: CPT

## 2019-01-18 PROCEDURE — 81001 URINALYSIS AUTO W/SCOPE: CPT

## 2019-01-18 PROCEDURE — 96372 THER/PROPH/DIAG INJ SC/IM: CPT

## 2019-01-18 PROCEDURE — 83036 HEMOGLOBIN GLYCOSYLATED A1C: CPT

## 2019-01-18 PROCEDURE — 74018 RADEX ABDOMEN 1 VIEW: CPT

## 2019-01-18 PROCEDURE — 99226 PR SUBSEQUENT OBSERVATION CARE,LEVEL III: CPT | Performed by: HOSPITALIST

## 2019-01-18 PROCEDURE — 70450 CT HEAD/BRAIN W/O DYE: CPT

## 2019-01-18 PROCEDURE — A9270 NON-COVERED ITEM OR SERVICE: HCPCS | Performed by: NURSE PRACTITIONER

## 2019-01-18 PROCEDURE — 36415 COLL VENOUS BLD VENIPUNCTURE: CPT

## 2019-01-18 PROCEDURE — 97162 PT EVAL MOD COMPLEX 30 MIN: CPT

## 2019-01-18 PROCEDURE — 82140 ASSAY OF AMMONIA: CPT

## 2019-01-18 PROCEDURE — 70250 X-RAY EXAM OF SKULL: CPT

## 2019-01-18 RX ORDER — ASPIRIN 81 MG/1
81 TABLET, CHEWABLE ORAL EVERY MORNING
Status: DISCONTINUED | OUTPATIENT
Start: 2019-01-18 | End: 2019-01-19 | Stop reason: HOSPADM

## 2019-01-18 RX ORDER — HYDROMORPHONE HYDROCHLORIDE 1 MG/ML
0.5 INJECTION, SOLUTION INTRAMUSCULAR; INTRAVENOUS; SUBCUTANEOUS
Status: DISCONTINUED | OUTPATIENT
Start: 2019-01-18 | End: 2019-01-18

## 2019-01-18 RX ORDER — KETOROLAC TROMETHAMINE 30 MG/ML
30 INJECTION, SOLUTION INTRAMUSCULAR; INTRAVENOUS EVERY 6 HOURS
Status: DISCONTINUED | OUTPATIENT
Start: 2019-01-18 | End: 2019-01-19 | Stop reason: HOSPADM

## 2019-01-18 RX ORDER — OXYCODONE HYDROCHLORIDE 5 MG/1
5 TABLET ORAL
Status: DISCONTINUED | OUTPATIENT
Start: 2019-01-18 | End: 2019-01-19 | Stop reason: HOSPADM

## 2019-01-18 RX ORDER — PROMETHAZINE HYDROCHLORIDE 12.5 MG/1
12.5-25 SUPPOSITORY RECTAL EVERY 4 HOURS PRN
Status: DISCONTINUED | OUTPATIENT
Start: 2019-01-18 | End: 2019-01-19 | Stop reason: HOSPADM

## 2019-01-18 RX ORDER — HYDROXYCHLOROQUINE SULFATE 200 MG/1
400 TABLET, FILM COATED ORAL
Status: DISCONTINUED | OUTPATIENT
Start: 2019-01-19 | End: 2019-01-19 | Stop reason: HOSPADM

## 2019-01-18 RX ORDER — POLYETHYLENE GLYCOL 3350 17 G/17G
1 POWDER, FOR SOLUTION ORAL
Status: DISCONTINUED | OUTPATIENT
Start: 2019-01-18 | End: 2019-01-19 | Stop reason: HOSPADM

## 2019-01-18 RX ORDER — DIPHENHYDRAMINE HYDROCHLORIDE 50 MG/ML
50 INJECTION INTRAMUSCULAR; INTRAVENOUS EVERY 6 HOURS PRN
Status: DISCONTINUED | OUTPATIENT
Start: 2019-01-18 | End: 2019-01-18

## 2019-01-18 RX ORDER — DIPHENHYDRAMINE HYDROCHLORIDE 50 MG/ML
50 INJECTION INTRAMUSCULAR; INTRAVENOUS EVERY 6 HOURS
Status: DISCONTINUED | OUTPATIENT
Start: 2019-01-18 | End: 2019-01-19 | Stop reason: HOSPADM

## 2019-01-18 RX ORDER — LEVETIRACETAM 500 MG/1
1500 TABLET ORAL 2 TIMES DAILY
Status: DISCONTINUED | OUTPATIENT
Start: 2019-01-18 | End: 2019-01-19 | Stop reason: HOSPADM

## 2019-01-18 RX ORDER — SODIUM CHLORIDE 9 MG/ML
INJECTION, SOLUTION INTRAVENOUS CONTINUOUS
Status: DISCONTINUED | OUTPATIENT
Start: 2019-01-18 | End: 2019-01-19 | Stop reason: HOSPADM

## 2019-01-18 RX ORDER — LEVETIRACETAM 500 MG/1
500 TABLET ORAL 3 TIMES DAILY
Status: DISCONTINUED | OUTPATIENT
Start: 2019-01-18 | End: 2019-01-18

## 2019-01-18 RX ORDER — PROMETHAZINE HYDROCHLORIDE 25 MG/1
12.5-25 TABLET ORAL EVERY 4 HOURS PRN
Status: DISCONTINUED | OUTPATIENT
Start: 2019-01-18 | End: 2019-01-19 | Stop reason: HOSPADM

## 2019-01-18 RX ORDER — KETOROLAC TROMETHAMINE 30 MG/ML
30 INJECTION, SOLUTION INTRAMUSCULAR; INTRAVENOUS ONCE
Status: COMPLETED | OUTPATIENT
Start: 2019-01-18 | End: 2019-01-18

## 2019-01-18 RX ORDER — AMOXICILLIN 250 MG
2 CAPSULE ORAL 2 TIMES DAILY
Status: DISCONTINUED | OUTPATIENT
Start: 2019-01-18 | End: 2019-01-19 | Stop reason: HOSPADM

## 2019-01-18 RX ORDER — OXYCODONE HYDROCHLORIDE 10 MG/1
10 TABLET ORAL
Status: DISCONTINUED | OUTPATIENT
Start: 2019-01-18 | End: 2019-01-19 | Stop reason: HOSPADM

## 2019-01-18 RX ORDER — HYDROMORPHONE HYDROCHLORIDE 2 MG/ML
1 INJECTION, SOLUTION INTRAMUSCULAR; INTRAVENOUS; SUBCUTANEOUS
Status: DISCONTINUED | OUTPATIENT
Start: 2019-01-18 | End: 2019-01-19 | Stop reason: HOSPADM

## 2019-01-18 RX ORDER — LEVETIRACETAM 500 MG/1
1500 TABLET ORAL 2 TIMES DAILY
Qty: 60 TAB | Refills: 0
Start: 2019-01-18 | End: 2019-05-27

## 2019-01-18 RX ORDER — DIHYDROERGOTAMINE MESYLATE 1 MG/ML
1 INJECTION, SOLUTION INTRAMUSCULAR; INTRAVENOUS; SUBCUTANEOUS ONCE
Status: COMPLETED | OUTPATIENT
Start: 2019-01-18 | End: 2019-01-18

## 2019-01-18 RX ORDER — ONDANSETRON 2 MG/ML
4 INJECTION INTRAMUSCULAR; INTRAVENOUS ONCE
Status: COMPLETED | OUTPATIENT
Start: 2019-01-18 | End: 2019-01-18

## 2019-01-18 RX ORDER — RISPERIDONE 1 MG/1
1 TABLET ORAL 2 TIMES DAILY
Status: DISCONTINUED | OUTPATIENT
Start: 2019-01-18 | End: 2019-01-19 | Stop reason: HOSPADM

## 2019-01-18 RX ORDER — LEVETIRACETAM 500 MG/1
500 TABLET ORAL 2 TIMES DAILY
Status: DISCONTINUED | OUTPATIENT
Start: 2019-01-19 | End: 2019-01-18

## 2019-01-18 RX ORDER — DULOXETIN HYDROCHLORIDE 60 MG/1
60 CAPSULE, DELAYED RELEASE ORAL 2 TIMES DAILY
Status: DISCONTINUED | OUTPATIENT
Start: 2019-01-18 | End: 2019-01-19 | Stop reason: HOSPADM

## 2019-01-18 RX ORDER — KETOROLAC TROMETHAMINE 30 MG/ML
30 INJECTION, SOLUTION INTRAMUSCULAR; INTRAVENOUS EVERY 6 HOURS PRN
Status: DISCONTINUED | OUTPATIENT
Start: 2019-01-18 | End: 2019-01-18

## 2019-01-18 RX ORDER — BISACODYL 10 MG
10 SUPPOSITORY, RECTAL RECTAL
Status: DISCONTINUED | OUTPATIENT
Start: 2019-01-18 | End: 2019-01-19 | Stop reason: HOSPADM

## 2019-01-18 RX ORDER — HYDROXYCHLOROQUINE SULFATE 200 MG/1
200 TABLET, FILM COATED ORAL
Status: DISCONTINUED | OUTPATIENT
Start: 2019-01-18 | End: 2019-01-19 | Stop reason: HOSPADM

## 2019-01-18 RX ORDER — DIVALPROEX SODIUM 500 MG/1
500 TABLET, EXTENDED RELEASE ORAL 2 TIMES DAILY
Status: DISCONTINUED | OUTPATIENT
Start: 2019-01-18 | End: 2019-01-18

## 2019-01-18 RX ORDER — ONDANSETRON 2 MG/ML
4 INJECTION INTRAMUSCULAR; INTRAVENOUS EVERY 4 HOURS PRN
Status: DISCONTINUED | OUTPATIENT
Start: 2019-01-18 | End: 2019-01-19 | Stop reason: HOSPADM

## 2019-01-18 RX ORDER — HYDROMORPHONE HYDROCHLORIDE 2 MG/ML
0.5 INJECTION, SOLUTION INTRAMUSCULAR; INTRAVENOUS; SUBCUTANEOUS
Status: DISCONTINUED | OUTPATIENT
Start: 2019-01-18 | End: 2019-01-18

## 2019-01-18 RX ORDER — OMEPRAZOLE 20 MG/1
20 CAPSULE, DELAYED RELEASE ORAL DAILY
Status: DISCONTINUED | OUTPATIENT
Start: 2019-01-18 | End: 2019-01-19 | Stop reason: HOSPADM

## 2019-01-18 RX ORDER — HYDROXYCHLOROQUINE SULFATE 200 MG/1
200 TABLET, FILM COATED ORAL DAILY
Status: DISCONTINUED | OUTPATIENT
Start: 2019-01-18 | End: 2019-01-18

## 2019-01-18 RX ORDER — KETOROLAC TROMETHAMINE 10 MG/1
10 TABLET, FILM COATED ORAL EVERY 6 HOURS PRN
Qty: 30 TAB | Refills: 0 | Status: SHIPPED | OUTPATIENT
Start: 2019-01-18 | End: 2019-05-27

## 2019-01-18 RX ORDER — ONDANSETRON 4 MG/1
4 TABLET, ORALLY DISINTEGRATING ORAL EVERY 4 HOURS PRN
Status: DISCONTINUED | OUTPATIENT
Start: 2019-01-18 | End: 2019-01-19 | Stop reason: HOSPADM

## 2019-01-18 RX ORDER — DIPHENHYDRAMINE HYDROCHLORIDE 50 MG/ML
25 INJECTION INTRAMUSCULAR; INTRAVENOUS EVERY 6 HOURS PRN
Status: DISCONTINUED | OUTPATIENT
Start: 2019-01-18 | End: 2019-01-18

## 2019-01-18 RX ORDER — MAGNESIUM SULFATE 1 G/100ML
1 INJECTION INTRAVENOUS ONCE
Status: COMPLETED | OUTPATIENT
Start: 2019-01-18 | End: 2019-01-18

## 2019-01-18 RX ORDER — POTASSIUM CHLORIDE 20 MEQ/1
40 TABLET, EXTENDED RELEASE ORAL ONCE
Status: COMPLETED | OUTPATIENT
Start: 2019-01-18 | End: 2019-01-18

## 2019-01-18 RX ORDER — LEVETIRACETAM 500 MG/1
500 TABLET ORAL ONCE
Status: COMPLETED | OUTPATIENT
Start: 2019-01-18 | End: 2019-01-18

## 2019-01-18 RX ORDER — GABAPENTIN 300 MG/1
600 CAPSULE ORAL 3 TIMES DAILY
Status: DISCONTINUED | OUTPATIENT
Start: 2019-01-18 | End: 2019-01-19 | Stop reason: HOSPADM

## 2019-01-18 RX ADMIN — ASPIRIN 81 MG 81 MG: 81 TABLET ORAL at 08:05

## 2019-01-18 RX ADMIN — HYDROXYCHLOROQUINE SULFATE 200 MG: 200 TABLET, FILM COATED ORAL at 06:31

## 2019-01-18 RX ADMIN — ONDANSETRON 4 MG: 2 INJECTION INTRAMUSCULAR; INTRAVENOUS at 05:59

## 2019-01-18 RX ADMIN — METOCLOPRAMIDE 10 MG: 5 INJECTION, SOLUTION INTRAMUSCULAR; INTRAVENOUS at 01:10

## 2019-01-18 RX ADMIN — DULOXETINE HYDROCHLORIDE 60 MG: 60 CAPSULE, DELAYED RELEASE ORAL at 06:32

## 2019-01-18 RX ADMIN — OXYCODONE HYDROCHLORIDE 10 MG: 10 TABLET ORAL at 08:06

## 2019-01-18 RX ADMIN — LEVETIRACETAM 500 MG: 500 TABLET ORAL at 12:13

## 2019-01-18 RX ADMIN — SODIUM CHLORIDE 2000 ML: 9 INJECTION, SOLUTION INTRAVENOUS at 00:30

## 2019-01-18 RX ADMIN — GABAPENTIN 600 MG: 300 CAPSULE ORAL at 06:32

## 2019-01-18 RX ADMIN — SODIUM CHLORIDE: 9 INJECTION, SOLUTION INTRAVENOUS at 18:17

## 2019-01-18 RX ADMIN — DIPHENHYDRAMINE HYDROCHLORIDE 25 MG: 50 INJECTION INTRAMUSCULAR; INTRAVENOUS at 11:05

## 2019-01-18 RX ADMIN — KETOROLAC TROMETHAMINE 30 MG: 30 INJECTION, SOLUTION INTRAMUSCULAR; INTRAVENOUS at 18:14

## 2019-01-18 RX ADMIN — LEVETIRACETAM 1500 MG: 500 TABLET ORAL at 18:14

## 2019-01-18 RX ADMIN — DULOXETINE HYDROCHLORIDE 60 MG: 60 CAPSULE, DELAYED RELEASE ORAL at 18:12

## 2019-01-18 RX ADMIN — HYDROMORPHONE HYDROCHLORIDE 0.5 MG: 2 INJECTION, SOLUTION INTRAMUSCULAR; INTRAVENOUS; SUBCUTANEOUS at 05:54

## 2019-01-18 RX ADMIN — KETOROLAC TROMETHAMINE 15 MG: 30 INJECTION, SOLUTION INTRAMUSCULAR at 01:09

## 2019-01-18 RX ADMIN — ENOXAPARIN SODIUM 40 MG: 100 INJECTION SUBCUTANEOUS at 16:23

## 2019-01-18 RX ADMIN — OMEPRAZOLE 20 MG: 20 CAPSULE, DELAYED RELEASE ORAL at 08:05

## 2019-01-18 RX ADMIN — RISPERIDONE 1 MG: 1 TABLET ORAL at 06:33

## 2019-01-18 RX ADMIN — HYDROMORPHONE HYDROCHLORIDE 0.5 MG: 2 INJECTION, SOLUTION INTRAMUSCULAR; INTRAVENOUS; SUBCUTANEOUS at 13:01

## 2019-01-18 RX ADMIN — HYDROMORPHONE HYDROCHLORIDE 1 MG: 2 INJECTION INTRAMUSCULAR; INTRAVENOUS; SUBCUTANEOUS at 22:46

## 2019-01-18 RX ADMIN — SODIUM CHLORIDE: 9 INJECTION, SOLUTION INTRAVENOUS at 04:48

## 2019-01-18 RX ADMIN — OXYCODONE HYDROCHLORIDE 10 MG: 10 TABLET ORAL at 04:58

## 2019-01-18 RX ADMIN — HYDROMORPHONE HYDROCHLORIDE 0.5 MG: 2 INJECTION, SOLUTION INTRAMUSCULAR; INTRAVENOUS; SUBCUTANEOUS at 09:17

## 2019-01-18 RX ADMIN — KETAMINE HYDROCHLORIDE 25 MG: 10 INJECTION, SOLUTION INTRAMUSCULAR; INTRAVENOUS at 03:36

## 2019-01-18 RX ADMIN — GABAPENTIN 600 MG: 300 CAPSULE ORAL at 18:12

## 2019-01-18 RX ADMIN — DIHYDROERGOTAMINE MESYLATE 1 MG: 1 INJECTION, SOLUTION INTRAMUSCULAR; INTRAVENOUS; SUBCUTANEOUS at 09:55

## 2019-01-18 RX ADMIN — Medication 500 UNITS: at 13:44

## 2019-01-18 RX ADMIN — LEVETIRACETAM 500 MG: 500 TABLET ORAL at 06:34

## 2019-01-18 RX ADMIN — DIPHENHYDRAMINE HYDROCHLORIDE 50 MG: 50 INJECTION INTRAMUSCULAR; INTRAVENOUS at 18:10

## 2019-01-18 RX ADMIN — LEVETIRACETAM 500 MG: 500 TABLET ORAL at 15:05

## 2019-01-18 RX ADMIN — HYDROMORPHONE HYDROCHLORIDE 1 MG: 2 INJECTION INTRAMUSCULAR; INTRAVENOUS; SUBCUTANEOUS at 19:45

## 2019-01-18 RX ADMIN — RISPERIDONE 1 MG: 1 TABLET ORAL at 18:14

## 2019-01-18 RX ADMIN — DIPHENHYDRAMINE HCL 25 MG: 25 TABLET ORAL at 01:21

## 2019-01-18 RX ADMIN — MAGNESIUM SULFATE IN DEXTROSE 1 G: 10 INJECTION, SOLUTION INTRAVENOUS at 01:20

## 2019-01-18 RX ADMIN — POTASSIUM CHLORIDE 40 MEQ: 1500 TABLET, EXTENDED RELEASE ORAL at 02:45

## 2019-01-18 RX ADMIN — ONDANSETRON 4 MG: 2 INJECTION INTRAMUSCULAR; INTRAVENOUS at 09:53

## 2019-01-18 RX ADMIN — GABAPENTIN 600 MG: 300 CAPSULE ORAL at 12:13

## 2019-01-18 RX ADMIN — OXYCODONE HYDROCHLORIDE 10 MG: 10 TABLET ORAL at 16:29

## 2019-01-18 RX ADMIN — KETOROLAC TROMETHAMINE 30 MG: 30 INJECTION, SOLUTION INTRAMUSCULAR; INTRAVENOUS at 11:05

## 2019-01-18 ASSESSMENT — ENCOUNTER SYMPTOMS
FEVER: 0
BLURRED VISION: 0
WEAKNESS: 1
COUGH: 0
BRUISES/BLEEDS EASILY: 0
HALLUCINATIONS: 0
DOUBLE VISION: 0
ABDOMINAL PAIN: 0
TREMORS: 1
PALPITATIONS: 0
DIZZINESS: 0
EYE DISCHARGE: 0
HEARTBURN: 0
FLANK PAIN: 0
WEIGHT LOSS: 0
VOMITING: 0
FOCAL WEAKNESS: 1
DEPRESSION: 0
DIAPHORESIS: 0
MYALGIAS: 0
SHORTNESS OF BREATH: 0
HEADACHES: 1
SPEECH CHANGE: 0
CHILLS: 0
NAUSEA: 0
SENSORY CHANGE: 1
HEMOPTYSIS: 0

## 2019-01-18 ASSESSMENT — COGNITIVE AND FUNCTIONAL STATUS - GENERAL
MOVING TO AND FROM BED TO CHAIR: A LITTLE
STANDING UP FROM CHAIR USING ARMS: A LITTLE
TURNING FROM BACK TO SIDE WHILE IN FLAT BAD: A LITTLE
MOBILITY SCORE: 16
SUGGESTED CMS G CODE MODIFIER MOBILITY: CK
MOVING FROM LYING ON BACK TO SITTING ON SIDE OF FLAT BED: A LOT
WALKING IN HOSPITAL ROOM: A LITTLE
CLIMB 3 TO 5 STEPS WITH RAILING: A LOT

## 2019-01-18 ASSESSMENT — LIFESTYLE VARIABLES
SUBSTANCE_ABUSE: 0
ALCOHOL_USE: NO

## 2019-01-18 ASSESSMENT — PAIN SCALES - GENERAL
PAINLEVEL_OUTOF10: 9
PAINLEVEL_OUTOF10: 9

## 2019-01-18 ASSESSMENT — GAIT ASSESSMENTS
GAIT LEVEL OF ASSIST: CONTACT GUARD ASSIST
ASSISTIVE DEVICE: FRONT WHEEL WALKER
DISTANCE (FEET): 25

## 2019-01-18 NOTE — ED TRIAGE NOTES
"Chief Complaint   Patient presents with   • Migraine     Pt ambulatory to triage with above complaint.  Pt states she has had a migraine for 14 days and has been seen at this ed several times.  Pt states weakness and some shaking associated with headache.      Pt placed in senior lounge, educated on triage process, and to inform staff of any changes or concerns.    Blood pressure 146/90, pulse (!) 120, temperature 36.4 °C (97.5 °F), temperature source Oral, resp. rate 18, height 1.6 m (5' 3\"), weight 79.7 kg (175 lb 11.3 oz), last menstrual period 01/17/2019, SpO2 98 %, not currently breastfeeding.      "

## 2019-01-18 NOTE — THERAPY
"Physical Therapy Evaluation completed.   Bed Mobility:  Supine to Sit: Contact Guard Assist  Transfers: Sit to Stand: Contact Guard Assist  Gait: Level Of Assist: Contact Guard Assist with Front-Wheel Walker       Plan of Care: Will benefit from Physical Therapy 3 times per week  Discharge Recommendations: Equipment: Front-Wheel Walker, and shower chair.  Post-acute therapy Discharge to home with outpatient or home health for additional skilled therapy services.    See \"Rehab Therapy-Acute\" Patient Summary Report for complete documentation.     Pt is a 48 year old female presenting with many symptoms including serv ere HA, generalized weakness, vision changes to L side, diarrhea, vomiting. PMH includes occipital nerve implant that was recently removed, pseudotumor cerebri,  shunt, and an undiagnosed autoimmune disorder. Pt lives with her  in a two story house, with several steps to get in/out.  does assist her with ADL's and for ambulation on a daily basis. Has a walk in shower with grab bars, and no shower bench. She does not own any AD or other mobility related equipment. Pt reports frequent falls in last several months. Upon evaluation Pt hade extreme HA described as 9/10 pain, which did not increase throughout eval. Pt had strength impairments on her R side with MMT grades of 4/5 on R vs 5/5 on L. Pt also had severely limited R shoulder ROM approximately 90 degrees of elevation which began a few weeks prior from unknown cause - but she and  report it was likely due to a fall down stairs. Reports she has plans to see outpatient PT for.  Also, had impaired sensation on L lower leg. Noted tremor like movements on her R side with active movements and strength assessments. Pt ambulated with FWW and close CGA from bedside to hallway and back for approximately 25 feet in total. Pt was unsteady and weak throughout. Pt was unsteady and weak throughout, and stated that she would \"probably fall\" with " gait or standing if not using FWW. Gait deviations included decreased toe off and heel strike, bradykinesia, ataxia, shuffled gait, short step length equal B, and heavy reliance of UE support on FWW. Pt was heavily fatigued following gait, however vitals and overall symptoms were unchanged. Pt demonstrates need for further skilled PT intervention to improve above impairments and improve her overall functional mobility. Plan to see 3x/week while in the acute care setting. Pt may also benefit from use of FWW, shower chair and a home health PT follow up to address her mobility needs.

## 2019-01-18 NOTE — ED PROVIDER NOTES
ED Provider Note    Scribed for Alexandru Justin M.D. by Shanon Rivera. 1/17/2019  11:28 PM    Means of arrival: Walk in  History obtained from: Patient  History limited by: None    CHIEF COMPLAINT  Chief Complaint   Patient presents with   • Migraine       HPI    Enedelia Pang is a 48 y.o. female presenting with a migraine onset 14 days ago. She endorses associated left eye vision loss, diarrhea, and vomiting. The patient was seen in the ED 5 days ago for the same migraine and states the pain was diminished to a 6/10 at that time, but currently rates it a 9/10. Her  was prompted to bring her to the ED tonight when she became confused 2 hours ago. The patient's  additionally reports the patient has developed generalized weakness and mild tremors. She took Benadryl earlier today with no alleviation. She has a significant history of migraines, but states her headache tonight is atypical. The patient denies recent fall or fever.     REVIEW OF SYSTEMS  See HPI for further details.   Pertinent positives include: migraine, left eye vision loss, diarrhea, vomiting, confusion, generalized weakness, and tremors.  Pertinent negative include: fall or fever.  10 + review of systems otherwise negative     PAST MEDICAL HISTORY   has a past medical history of Allergy, unspecified not elsewhere classified; Anemia (10/05/2017); Anxiety; autoimmune; Depression; H/O Clostridium difficile infection; MRSA infection; Hydrocephalus; Migraine with aura, with intractable migraine, so stated, without mention of status migrainosus; MRSA (methicillin resistant Staphylococcus aureus); Pituitary abnormality (HCC); Staph infection; and Stroke (HCC).    SOCIAL HISTORY  Social History     Social History Main Topics   • Smoking status: Never Smoker   • Smokeless tobacco: Never Used   • Alcohol use Yes      Comment: Rare   • Drug use: No       SURGICAL HISTORY   has a past surgical history that includes cholecystectomy;  "tonsillectomy; appendectomy; tubal ligation; other; other neurological surg; irrigation & debridement neuro (N/A, 6/28/2015); lumbar exploration (N/A, 8/31/2015); spinal cord stimulator (12/19/2016); and  shunt insertion (5/31/2017).    CURRENT MEDICATIONS  Home Medications     Reviewed by Jesús Lou R.N. (Registered Nurse) on 01/17/19 at 2231  Med List Status: Not Addressed   Medication Last Dose Status   aspirin (ASA) 81 MG Chew Tab chewable tablet  Active   Buprenorphine 5 MCG/HR PATCH WEEKLY  Active   Calcium Citrate-Vitamin D (CALCIUM + D PO)  Active   diphenhydrAMINE (BENADRYL) 50 MG/ML Solution  Active   divalproex ER (DEPAKOTE ER) 500 MG TABLET SR 24 HR  Active   duloxetine (CYMBALTA) 60 MG CPEP delayed-release capsule  Active   gabapentin (NEURONTIN) 600 MG tablet  Active   hydroxychloroquine (PLAQUENIL) 200 MG Tab  Active   ketorolac (TORADOL) 60 MG/2ML Solution  Active   levetiracetam (KEPPRA) 1000 MG tablet  Active   levETIRAcetam (KEPPRA) 500 MG Tab  Active   NON SPECIFIED  Active   omeprazole (PRILOSEC) 20 MG delayed-release capsule  Active   predniSONE (DELTASONE) 5 MG Tab  Active   risperiDONE (RISPERDAL) 1 MG Tab  Active                ALLERGIES  Allergies   Allergen Reactions   • Sumatriptan Unspecified     Historical=\"Heart stops.\" Reaction  between 1995 to 1997   • Prochlorperazine Swelling     Tongue swelling. Reaction as a teen. (compazine)  Tolerated Phenergan on 02/24/15   • Vancomycin Shortness of Breath and Rash     Reaction in 2005.  D/W patient 8/31/15 - tolerated loading dose of vancomycin administered on 8/30/15 with some itching to chest with decreased infusion rate. Red Man Syndrome       PHYSICAL EXAM  VITAL SIGNS: /90   Pulse (!) 120   Temp 36.4 °C (97.5 °F) (Oral)   Resp 18   Ht 1.6 m (5' 3\")   Wt 79.7 kg (175 lb 11.3 oz)   LMP 01/17/2019   SpO2 98%   BMI 31.13 kg/m²    SpO2: I interpret this pulse oximetry as normal  Constitutional: Well developed, Well " nourished, Mild distress, Non-toxic appearance.   HENT: Normocephalic, Atraumatic, Bilateral external ears normal, Oropharynx moist, No oral exudates.   Eyes: PERRLA, EOMI, Conjunctiva normal, No discharge. Visual fields grossly intact.  Neck: No tenderness, Supple, No stridor.   Cardiovascular: Tachycardic heart rate, Normal rhythm.   Thorax & Lungs: Clear to auscultation bilaterally, No respiratory distress, No wheezing, No crackles.   Abdomen: Soft, No tenderness, No masses, No pulsatile masses.   Skin: Warm, Dry, No erythema, No rash.   Extremities:, No edema No cyanosis.   Musculoskeletal: No tenderness to palpation or major deformities noted.  Intact distal pulses  Neurologic: Awake, alert. Moves all extremities spontaneously. Normal rapid alternating hand movements. Cranial Nerves II - XII intact. Normal heel to shin. Difficult finger to nose on right. Intentional tremor which resolves at rest.  Equal strength in all extremities with sensation intact throughout.  Visual fields grossly intact with right eye covered.  Psychiatric: Affect normal, Judgment normal, Mood normal.       DIAGNOSTIC STUDIES / PROCEDURES    LABORATORY  Results for orders placed or performed during the hospital encounter of 01/17/19   VALPROIC ACID   Result Value Ref Range    Valproic Acid 12.5 (L) 50.0 - 100.0 ug/mL   CBC WITH DIFFERENTIAL   Result Value Ref Range    WBC 6.1 4.8 - 10.8 K/uL    RBC 4.20 4.20 - 5.40 M/uL    Hemoglobin 13.5 12.0 - 16.0 g/dL    Hematocrit 39.9 37.0 - 47.0 %    MCV 95.0 81.4 - 97.8 fL    MCH 32.1 27.0 - 33.0 pg    MCHC 33.8 33.6 - 35.0 g/dL    RDW 45.6 35.9 - 50.0 fL    Platelet Count 335 164 - 446 K/uL    MPV 10.3 9.0 - 12.9 fL    Neutrophils-Polys 70.80 44.00 - 72.00 %    Lymphocytes 17.00 (L) 22.00 - 41.00 %    Monocytes 11.30 0.00 - 13.40 %    Eosinophils 0.30 0.00 - 6.90 %    Basophils 0.30 0.00 - 1.80 %    Immature Granulocytes 0.30 0.00 - 0.90 %    Nucleated RBC 0.00 /100 WBC    Neutrophils (Absolute)  4.34 2.00 - 7.15 K/uL    Lymphs (Absolute) 1.04 1.00 - 4.80 K/uL    Monos (Absolute) 0.69 0.00 - 0.85 K/uL    Eos (Absolute) 0.02 0.00 - 0.51 K/uL    Baso (Absolute) 0.02 0.00 - 0.12 K/uL    Immature Granulocytes (abs) 0.02 0.00 - 0.11 K/uL    NRBC (Absolute) 0.00 K/uL   IONIZED CALCIUM   Result Value Ref Range    Ionized Calcium 1.2 1.1 - 1.3 mmol/L   URINALYSIS CULTURE, IF INDICATED   Result Value Ref Range    Color Yellow     Character Clear     Specific Gravity 1.003 <1.035    Ph 6.5 5.0 - 8.0    Glucose Negative Negative mg/dL    Ketones Negative Negative mg/dL    Protein Negative Negative mg/dL    Bilirubin Negative Negative    Urobilinogen, Urine 0.2 Negative    Nitrite Negative Negative    Leukocyte Esterase Trace (A) Negative    Occult Blood Trace (A) Negative    Micro Urine Req Microscopic    URINE MICROSCOPIC (W/UA)   Result Value Ref Range    WBC 0-2 /hpf    RBC 0-2 /hpf    Bacteria Few (A) None /hpf    Epithelial Cells Few /hpf         RADIOLOGY    CT-HEAD W/O   Final Result      1.  No CT evidence of acute infarct, hemorrhage or mass.   2.  Stable right  shunt catheter position. Stable ventricular size. No hydrocephalus.      MR-BRAIN-W/O    (Results Pending)   DX-CHEST-LIMITED (1 VIEW)    (Results Pending)   DX-SPINE-ANY ONE VIEW    (Results Pending)   SL-FGLRDUD-8 VIEW    (Results Pending)   DX-SKULL-LIMITED 3-    (Results Pending)             CHART REVIEW  Pertinent medical chart information was reviewed and reveals: Multiple visits for similar symptoms    COURSE & MEDICAL DECISION MAKING  Pertinent Labs & Imaging studies reviewed. (See chart for details)    Differential diagnoses include but not limited to: Migraine, tension headache, cluster headache, ICP, ICH, CVA    11:28 PM - Patient seen and examined at bedside. Discussed plan of care, including lab and imaging. Patient agrees to the plan of care. The patient will be resuscitated with 2L NS IV for tachycardia likely dehydration, and  "medicated with Toradol 15 mg, Magnesium Sulfate 1 g, Reglan 10 mg, Benadryl 25 mg, and Valproic Acid 500 mg. Ordered for CT-Head, MR-Brain, POC Urinalysis, POC Urine Pregnancy, CBC with differential, CMP, Lipase, Ionized calcium, magnesium, and valproic acid to evaluate her symptoms.     Vitals:    01/17/19 2224 01/17/19 2228   BP: 146/90    Pulse: (!) 120    Resp: 18    Temp: 36.4 °C (97.5 °F)    TempSrc: Oral    SpO2: 98%    Weight:  79.7 kg (175 lb 11.3 oz)   Height: 1.6 m (5' 3\")        Medications   valproic acid 100 mg/mL injection - migraine 500 mg (not administered)   Magnesium Sulfate in D5W IVPB premix 1 g (not administered)   Ketamine (KETALAR) 25 mg in NS 50 mL IVPB (low dose pain infusion) (not administered)   senna-docusate (PERICOLACE or SENOKOT S) 8.6-50 MG per tablet 2 Tab (not administered)     And   polyethylene glycol/lytes (MIRALAX) PACKET 1 Packet (not administered)     And   magnesium hydroxide (MILK OF MAGNESIA) suspension 30 mL (not administered)     And   bisacodyl (DULCOLAX) suppository 10 mg (not administered)   NS infusion (not administered)   ondansetron (ZOFRAN) syringe/vial injection 4 mg (not administered)   ondansetron (ZOFRAN ODT) dispertab 4 mg (not administered)   promethazine (PHENERGAN) tablet 12.5-25 mg (not administered)   promethazine (PHENERGAN) suppository 12.5-25 mg (not administered)   prochlorperazine (COMPAZINE) injection 5-10 mg (not administered)   Pharmacy Consult Request ...Pain Management Review 1 Each (not administered)     And   oxyCODONE immediate-release (ROXICODONE) tablet 5 mg (not administered)     And   oxyCODONE immediate-release (ROXICODONE) tablet 10 mg (not administered)     And   HYDROmorphone pf (DILAUDID) injection 0.5 mg (not administered)   aspirin (ASA) chewable tab 81 mg (not administered)   divalproex ER (DEPAKOTE ER) tablet 500 mg (not administered)   DULoxetine (CYMBALTA) capsule 60 mg (not administered)   gabapentin (NEURONTIN) tablet 600 mg " (not administered)   hydroxychloroquine (PLAQUENIL) tablet 200 mg (not administered)   levetiracetam (KEPPRA) TABS 1,000 mg (not administered)   omeprazole (PRILOSEC) capsule 20 mg (not administered)   risperiDONE (RISPERDAL) tablet 1 mg (not administered)   NS infusion 2,000 mL (2,000 mL Intravenous New Bag 1/18/19 0030)   metoclopramide (REGLAN) injection 10 mg (10 mg Intravenous Given 1/18/19 0110)   diphenhydrAMINE (BENADRYL) tablet/capsule 25 mg (25 mg Oral Given 1/18/19 0121)   ketorolac (TORADOL) injection 15 mg (15 mg Intravenous Given 1/18/19 0109)       HYDRATION: Based on the patient's presentation of Tachycardia the patient was given IV fluids. IV Hydration was used because oral hydration was not adequate alone. Upon recheck following hydration, the patient was reported to be feeling better with improved tachycardia.    48-year-old female with history of chronic severe migraines presenting for acute on chronic headache.  States pain is similar to migraines however worsened.  States having left eye blindness, confusion.   confirms these symptoms.  Has been seen here multiple times for migraine, states has had minimal improvement.  Vital signs are reassuring, slightly tachycardic.  No fever.  Exam with slight tremor, patient endorses several neurological complaints including weakness, visual loss, however neuro exam is completely normal with no visual field deficits.  Patient did have confused and garbled speech to several questions, then later became very lucid.  Very odd presentation for migraine, given empiric migraine treatment, minimally improved, requiring Depakote and ketamine.  With vague neurological symptoms and multiple visits, will provide full evaluation with MRI.  Shunt series also ordered.  CT negative for hydrocephalus or shunt malfunction at this time.  Labs are relatively unremarkable.  Reevaluation of patient shows she is stable and pain is somewhat improved however pain still  persistent.   is concerned that she is variably confused.  Patient appears quite lucid and alert and oriented x4 to me.  No vital signs or symptoms concerning for occult meningitis, no significant leukocytosis or other lab abnormalities.  Confirmed with patient and  that occipital nerve implant has been removed, has had MRI since then.  With difficult to control pain and possible neurological complaints, consulted Dr. Nicole with medicine for admission for further treatment and monitoring, neuro follow-up.  He is aware that patient will need neurology follow-up for shunt function after MRI.  Patient hemodynamically stable and comfortable at time of admission.    DISPOSITION  Admitted to medicine in stable condition    FINAL IMPRESSION  Visit Diagnoses     ICD-10-CM   1. Encounter for long-term (current) use of high-risk medication Z79.899   2. Current chronic use of systemic steroids Z79.52   3. Intractable migraine with status migrainosus, unspecified migraine type G43.911        Shanon MADERA (Rob), am scribing for, and in the presence of, Alexandru Justin M.D..    Electronically signed by: Shanon Rivera (Rob), 1/17/2019    Alexandru MADERA M.D. personally performed the services described in this documentation, as scribed by Shanon Rivera in my presence, and it is both accurate and complete.    C.    The note accurately reflects work and decisions made by me.  Alexandru Justin  1/18/2019  2:02 AM

## 2019-01-18 NOTE — DISCHARGE SUMMARY
Discharge Summary    CHIEF COMPLAINT ON ADMISSION  Chief Complaint   Patient presents with   • Migraine       Reason for Admission  Intractable migrane     Admission Date  1/17/2019    CODE STATUS  Full Code    HPI & HOSPITAL COURSE  This is a 48 y.o. female here with chronic and intractable headache.  Please see the dictated history of present illness 1/17/2019 for complete details.  In short, the patient presents for the fourth time this month due to pain.  She describes migraine type headaches for 30+ years (since she was 14 years old) and has extensive history of surgical and medical intervention.  She is followed by neurology, chronic pain, neurosurgery, and rheumatology due to history of psychogenic seizures, nerve stimulator placement and subsequent removal, pseudotumor cerebri with  shunt placement, depression and anxiety, history of C. difficile and MRSA, pituitary abnormality, allergies, anemia, RA and obesity.  She is currently pending replacement of an occipital nerve stimulator trial next month.  The patient felt she was doing better off of her Butrans and discontinued this and has had exacerbation of her headache without successful remission.  Her chronic pain providers restarted her on Butrans at 7.5 mcg transdermal, and she is currently compliant with this, but she has been overusing injection Benadryl. She has also been using IM toradol multiple times daily, but her pharmacy recently ran out of stock. All of this has contributed to her relapse. She feels if she can this headache to break, she can regain tolerable and functional pain on her Butrans patch and outpatient regimen.    Patient was admitted around 11 PM 1/17/2019 and underwent MRI of the brain approximately 3 AM 1/18/19.  This was negative for evidence of recurrent hydrocephalus, hemorrhage, mass lesion, or CVA.  Unfortunately neurosurgery office was not contacted regarding the need for  shunt check preprocedure.  I have confirmed with  2 separate providers from that office that her [employer] strata 2  shunt is set at 1.5.  This is been reexamined post MRI and is at 1.25 approximately and readjusted to 1.5.  This is been re-checked multiple times with nursing present showing appropriate settings at 1.5 per the neurosurgery office recommendations.    The patient received multiple therapies to reduce her head pain including ketamine in the ER, DHE, Dilaudid, Toradol, magnesium, Reglan, and Compazine, all with little improvement.  She reports her pain score at approximately 9 out of 10 in severity.  Unfortunately, during her last ER encounter she was again given Depakote as preventative therapy as this has been effective for her in the past.  Her liver enzymes ballooned with AST at 566, ALT at 751, and alk phosphatase at 206.  Total bilirubin is normal at 1.0.  Unfortunately, the patient reports this is her third attempt at Depakote with transaminitis resulting each time. This has been discontinued.  Subsequent liver function tests show improvement at discharge to , , and alk phosphatase 145.  Total bilirubin remains normal.    We attempted discharge 1/18/2000 and patient was tearful, reporting pain at 9 out of 10 in severity. She and her family reported that if discharge they would return to the emergency room immediately for further pain support.  Thus, her discharge was canceled and her outpatient pain management provider was again consulted regarding recommendations for her care.  Patient did report reduction in her pain with Dilaudid and thus her IV therapy was titrated upward with pain scores ultimately reducing to 5 out of 10 in severity.  Her Toradol was scheduled every 6 hours along with IV Benadryl, oral Keppra, and Cymbalta.  Finally, this morning, she reports pain that has been manageable and it reduced sufficiently for discharge.  She will be given a prescription at discharge for oral Dilaudid at slightly higher doses to  allow for hepatic first-pass metabolism therapy to production.  Her outpatient pain management provider has facilitated follow-up 1/23/2019.     In prescribing controlled substances to this patient, I certify that I have obtained and reviewed the medical history of Enedelia Pang. I have also made a good luciano effort to obtain applicable records from other providers who have treated the patient and records demonstrating the following: consistent use of opioid therapy, with regular outpatient follow up, and no clear evidence of misuse, diversion, or multiple providers.     I have conducted a physical exam and documented it. I have reviewed Ms. Pang’s prescription history as maintained by the [place] Prescription Monitoring Program.     I have assessed the patient’s risk for abuse, dependency, and addiction using the validated Opioid Risk Tool available at https://www.mdcalc.com/wmvlyo-svdu-vhmg-ort-narcotic-abuse.     Given the above, I believe the benefits of controlled substance therapy outweigh the risks. The reasons for prescribing controlled substances include non-narcotic, oral analgesic alternatives have been inadequate for pain control. Accordingly, I have discussed the risk and benefits, treatment plan, and alternative therapies with the patient.     Therefore, she is discharged in good and stable condition to home with close outpatient follow-up.    Discharge Date  1/19/2019    FOLLOW UP ITEMS POST DISCHARGE  None    DISCHARGE DIAGNOSES  Principal Problem:    Intractable migraine POA: Yes  Active Problems:    Seizures (HCC) POA: Yes    S/P  shunt (Chronic) POA: Yes    Hypokalemia POA: Yes    Rheumatoid arthritis (HCC) POA: Yes    Abnormal LFTs POA: Yes    Skin lesions POA: Unknown  Resolved Problems:    * No resolved hospital problems. *      FOLLOW UP  Future Appointments  Date Time Provider Department Center   1/24/2019 10:00 AM ALFRED Freeman None   3/19/2019 10:40 AM Drew Ribera M.D.  [facility] None     Elliott Power M.D.  [address]  [place] [address]  [phone]    Go on 1/24/2019  PLEASE ARRIVE AT 11:00AM FOR YOUR APPOINTMENT. THANK YOU      MEDICATIONS ON DISCHARGE     Medication List      START taking these medications      Instructions   HYDROmorphone 2 MG Tabs  Commonly known as:  DILAUDID   Take 1 Tab by mouth every 4 hours while awake for 7 days.  Dose:  2 mg     ketorolac 10 MG Tabs  Commonly known as:  TORADOL  Replaces:  ketorolac 60 MG/2ML Soln   Take 1 Tab by mouth every 6 hours as needed for Severe Pain.  Dose:  10 mg        CHANGE how you take these medications      Instructions   predniSONE 5 MG Tabs  What changed:  additional instructions  Commonly known as:  DELTASONE   30 mg po q day x 5 days and decrease every 5 days by 5 mg back to the current dose of 15 mg        CONTINUE taking these medications      Instructions   aspirin 81 MG Chew chewable tablet  Commonly known as:  ASA   Take 81 mg by mouth every morning.  Dose:  81 mg     Buprenorphine 5 MCG/HR Ptwk   buprenorphine 5 mcg/hour weekly transdermal patch  Apply 1 patch every week by transdermal route as directed for 30 days.     CALCIUM + D PO   Take 1 Tab by mouth every morning.  Dose:  1 Tab     diphenhydrAMINE 50 MG/ML Soln  Commonly known as:  BENADRYL   50 mg by Intramuscular route every 6 hours as needed. With Toradol for migraines  Dose:  50 mg     DULoxetine 60 MG Cpep delayed-release capsule  Commonly known as:  CYMBALTA   Take 60 mg by mouth 2 times a day.  Dose:  60 mg     gabapentin 600 MG tablet  Commonly known as:  NEURONTIN   Take 600 mg by mouth 3 times a day.  Dose:  600 mg     hydroxychloroquine 200 MG Tabs  Commonly known as:  PLAQUENIL   Alternate 200 mg and 400 mg daily po     * KEPPRA 1000 MG tablet  Generic drug:  levetiracetam   Take  by mouth 3 times a day.     * levETIRAcetam 500 MG Tabs  Commonly known as:  KEPPRA   Take 3 Tabs by mouth 2 Times a Day.  Dose:  1500 mg     NON SPECIFIED     "  omeprazole 20 MG delayed-release capsule  Commonly known as:  PRILOSEC   Take 1 Cap by mouth every day.  Dose:  20 mg     risperiDONE 1 MG Tabs  Commonly known as:  RISPERDAL   Take 2 mg by mouth 2 times a day.  Dose:  2 mg        * This list has 2 medication(s) that are the same as other medications prescribed for you. Read the directions carefully, and ask your doctor or other care provider to review them with you.            STOP taking these medications    divalproex  MG Tb24  Commonly known as:  DEPAKOTE ER     ketorolac 60 MG/2ML Soln  Commonly known as:  TORADOL  Replaced by:  ketorolac 10 MG Tabs            Allergies  Allergies   Allergen Reactions   • Sumatriptan Unspecified     Historical=\"Heart stops.\" Reaction  between 1995 to 1997   • Prochlorperazine Swelling     Tongue swelling. Reaction as a teen. (compazine)  Tolerated Phenergan on 02/24/15   • Vancomycin Shortness of Breath and Rash     Reaction in 2005.  D/W patient 8/31/15 - tolerated loading dose of vancomycin administered on 8/30/15 with some itching to chest with decreased infusion rate. Red Man Syndrome       DIET  Orders Placed This Encounter   Procedures   • Diet Order Regular, Cardiac     Standing Status:   Standing     Number of Occurrences:   1     Order Specific Question:   Diet:     Answer:   Regular [1]     Order Specific Question:   Diet:     Answer:   Cardiac [6]       ACTIVITY  As tolerated.  Weight bearing as tolerated    CONSULTATIONS  None    PROCEDURES  None    LABORATORY  Lab Results   Component Value Date    SODIUM 135 01/18/2019    POTASSIUM 3.3 (L) 01/18/2019    CHLORIDE 106 01/18/2019    CO2 24 01/18/2019    GLUCOSE 70 01/18/2019    BUN 6 (L) 01/18/2019    CREATININE 0.61 01/18/2019        Lab Results   Component Value Date    WBC 6.1 01/18/2019    HEMOGLOBIN 13.5 01/18/2019    HEMATOCRIT 39.9 01/18/2019    PLATELETCT 335 01/18/2019        Total time of the discharge process exceeds 36 minutes.  "

## 2019-01-18 NOTE — ASSESSMENT & PLAN NOTE
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   Has had previious biopsies   Needs outpatient derm follow up

## 2019-01-18 NOTE — H&P
Hospital Medicine History & Physical Note    Date of Service  1/18/2019    Primary Care Physician  Elliott Power M.D.    Consultants  None    Code Status  full    Chief Complaint  Intractable headache    History of Presenting Illness  48 y.o. female who presented 1/17/2019 with applicator past medical history presents with intractable migraine.  This patient's migraine onset was about 14 days ago she has been to the emergency department several times since then.  She has a history of intractable headaches since age 14 years old, she has had a history of pseudotumor cerebri and  shunt placement.  She is also had occipital nerve stimulator placed that was recently removed.  Who presents with intractable left-sided headache change from previous headaches.  Usually her headaches are on the right side now on the left with lower extremity weakness that is subjective.  She is also had tremors changes in vision.  She follows with neurology Dr. Ribera for her intractable headaches.  She will be admitted to the hospital for pain control and neurology consultation in the morning.    Review of Systems  Review of Systems   Constitutional: Negative for chills and fever.   HENT: Negative for congestion, hearing loss and tinnitus.    Eyes: Negative for blurred vision, double vision and discharge.   Respiratory: Negative for cough, hemoptysis and shortness of breath.    Cardiovascular: Negative for chest pain, palpitations and leg swelling.   Gastrointestinal: Negative for abdominal pain, heartburn, nausea and vomiting.   Genitourinary: Negative for dysuria and flank pain.   Musculoskeletal: Negative for joint pain and myalgias.   Skin: Negative for rash.   Neurological: Positive for tremors, sensory change, focal weakness, weakness and headaches. Negative for dizziness and speech change.   Endo/Heme/Allergies: Negative for environmental allergies. Does not bruise/bleed easily.   Psychiatric/Behavioral: Negative for  "depression, hallucinations and substance abuse.       Past Medical History   has a past medical history of Allergy, unspecified not elsewhere classified; Anemia (10/05/2017); Anxiety; autoimmune; Depression; H/O Clostridium difficile infection; MRSA infection; Hydrocephalus; Migraine with aura, with intractable migraine, so stated, without mention of status migrainosus; MRSA (methicillin resistant Staphylococcus aureus); Pituitary abnormality (HCC); Staph infection; and Stroke ([facility]). She also has no past medical history of Addisons disease ([facility]).    Surgical History   has a past surgical history that includes cholecystectomy; tonsillectomy; appendectomy; tubal ligation; other; other neurological surg; irrigation & debridement neuro (N/A, 6/28/2015); lumbar exploration (N/A, 8/31/2015); spinal cord stimulator (12/19/2016); and  shunt insertion (5/31/2017).     Family History  Reviewed and not pertinent     Social History   reports that she has never smoked. She has never used smokeless tobacco. She reports that she drinks alcohol. She reports that she does not use drugs.    Allergies  Allergies   Allergen Reactions   • Sumatriptan Unspecified     Historical=\"Heart stops.\" Reaction  between 1995 to 1997   • Prochlorperazine Swelling     Tongue swelling. Reaction as a teen. (compazine)  Tolerated Phenergan on 02/24/15   • Vancomycin Shortness of Breath and Rash     Reaction in 2005.  D/W patient 8/31/15 - tolerated loading dose of vancomycin administered on 8/30/15 with some itching to chest with decreased infusion rate. Red Man Syndrome       Medications  Prior to Admission Medications   Prescriptions Last Dose Informant Patient Reported? Taking?   Buprenorphine 5 MCG/HR PATCH WEEKLY   Yes No   Sig: buprenorphine 5 mcg/hour weekly transdermal patch   Apply 1 patch every week by transdermal route as directed for 30 days.   Calcium Citrate-Vitamin D (CALCIUM + D PO)  Patient Yes No   Sig: Take 1 Tab by mouth every " morning.   NON SPECIFIED   Yes No   aspirin (ASA) 81 MG Chew Tab chewable tablet  Patient Yes No   Sig: Take 81 mg by mouth every morning.   diphenhydrAMINE (BENADRYL) 50 MG/ML Solution  Patient Yes No   Si mg by Intramuscular route every 6 hours as needed. With Toradol for migraines   divalproex ER (DEPAKOTE ER) 500 MG TABLET SR 24 HR   No No   Sig: Take 1 Tab by mouth 2 Times a Day.   duloxetine (CYMBALTA) 60 MG CPEP delayed-release capsule  Patient Yes No   Sig: Take 60 mg by mouth 2 times a day.   gabapentin (NEURONTIN) 600 MG tablet  Patient Yes No   Sig: Take 600 mg by mouth 3 times a day.   hydroxychloroquine (PLAQUENIL) 200 MG Tab   No No   Sig: Alternate 200 mg and 400 mg daily po   ketorolac (TORADOL) 60 MG/2ML Solution  Patient Yes No   Si mg by Intramuscular route every 6 hours as needed. With Benadryl for migraines   levETIRAcetam (KEPPRA) 500 MG Tab   No No   Sig: Take 1 Tab by mouth 2 times a day.   Patient taking differently: Take 500 mg by mouth 3 times a day.   levetiracetam (KEPPRA) 1000 MG tablet   Yes No   Sig: Take  by mouth 3 times a day.   omeprazole (PRILOSEC) 20 MG delayed-release capsule  Patient No No   Sig: Take 1 Cap by mouth every day.   predniSONE (DELTASONE) 5 MG Tab   No No   Si mg po q day x 5 days and decrease every 5 days by 5 mg back to the current dose of 15 mg   risperiDONE (RISPERDAL) 1 MG Tab  Patient Yes No   Sig: Take 1 mg by mouth 2 times a day.      Facility-Administered Medications: None       Physical Exam  Temp:  [36.4 °C (97.5 °F)] 36.4 °C (97.5 °F)  Pulse:  [120] 120  Resp:  [18] 18  BP: (146)/(90) 146/90  SpO2:  [98 %] 98 %    Physical Exam   Constitutional: She is oriented to person, place, and time. She appears well-developed and well-nourished. She appears distressed.   HENT:   Head: Normocephalic and atraumatic.   Eyes: Pupils are equal, round, and reactive to light. Conjunctivae and EOM are normal.   Normal visual field testing   Neck: Normal  range of motion. Neck supple. No JVD present.   No neck stifness   Cardiovascular: Normal rate, regular rhythm, normal heart sounds and intact distal pulses.    No murmur heard.  Pulmonary/Chest: Effort normal and breath sounds normal. No respiratory distress. She has no wheezes.   Abdominal: Soft. Bowel sounds are normal. She exhibits no distension. There is no tenderness.   Musculoskeletal: Normal range of motion. She exhibits no edema.   Skin ulcers over hands   Neurological: She is alert and oriented to person, place, and time. No cranial nerve deficit. She exhibits normal muscle tone.   Resting tremor    Skin: Skin is warm and dry.   Psychiatric: She has a normal mood and affect. Her behavior is normal. Judgment and thought content normal.   Nursing note and vitals reviewed.      Laboratory:  Recent Labs      01/18/19   0030   WBC  6.1   RBC  4.20   HEMOGLOBIN  13.5   HEMATOCRIT  39.9   MCV  95.0   MCH  32.1   MCHC  33.8   RDW  45.6   PLATELETCT  335   MPV  10.3         No results for input(s): ALTSGPT, ASTSGOT, ALKPHOSPHAT, TBILIRUBIN, DBILIRUBIN, GAMMAGT, AMYLASE, LIPASE, ALB, PREALBUMIN, GLUCOSE in the last 72 hours.              No results for input(s): TROPONINI in the last 72 hours.    Urinalysis:    Recent Labs      01/18/19   0136   SPECGRAVITY  1.003   GLUCOSEUR  Negative   KETONES  Negative   NITRITE  Negative   LEUKESTERAS  Trace*        Imaging:  DX-CHEST-LIMITED (1 VIEW)   Final Result      1.  Visualized shunt tubing shows no discontinuity.   2.  Lungs are clear.      DX-SKULL-LIMITED 3-   Final Result      No discontinuity of the visualized shunt tubing.      [id number] VIEW   Final Result      1.  Tip of the shunt catheter projects over the left hemiabdomen. It has moved since prior study.   2.  Nonobstructive bowel gas pattern.      CT-HEAD W/O   Final Result      1.  No CT evidence of acute infarct, hemorrhage or mass.   2.  Stable right  shunt catheter position. Stable ventricular size.  No hydrocephalus.      MR-BRAIN-W/O    (Results Pending)         Assessment/Plan:  I anticipate this patient is appropriate for observation status at this time.    * Intractable migraine- (present on admission)   Assessment & Plan    Shunt series done in Er unrmearkable   Will hold home depakote given elevation in LFTS   Trial on Ketamine dose   Continue pain control  Neuro checks   Shunt series unremarkable   CT head unremarkable   MRI brain ordered and pending   Hx of autoimmune cerebritis? Hold steroids for now Defer to neuro recs  Consult Neuro in am. Patient of Dr. 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   Has had previious biopsies   Needs outpatient derm follow up      Abnormal LFTs   Assessment & Plan    Cont to trend   Suspected due to depakote usage  Hold home depakote for now      Rheumatoid arthritis (HCC)- (present on admission)   Assessment & Plan    Resume home plaquenil      Hypokalemia- (present on admission)   Assessment & Plan    Replace and recheck          VTE prophylaxis: scd

## 2019-01-18 NOTE — PROGRESS NOTES
Pt given and understands discharge instructions, (1) Rx.  R port cath removed, flushed with heparin, covered with gauze and transparent film.  Pt tearful, wants to make complaint that she is being discharged before she feels ready; requesting Charge RN.  Pt stated that she normally has to have 3 doses of demerol, 6 hours apart, to get relief from her migraine.  Pt refused to sign her discharge instructions.

## 2019-01-18 NOTE — ASSESSMENT & PLAN NOTE
Seizure precautions ordered  Hold home depakote given elevation in lfts   keppra and neurontin to be continued

## 2019-01-18 NOTE — ASSESSMENT & PLAN NOTE
Due to hx of pseudotumor cerebri  Shunt series unremarkable  1/18/2019-- BP shunt setting re-evaluated and reset to 1.5 per neurosurgery recommendations

## 2019-01-18 NOTE — DISCHARGE INSTRUCTIONS
Migraine Headache  A migraine headache is a very strong throbbing pain on one side or both sides of your head. Migraines can also cause other symptoms. Talk with your doctor about what things may bring on (trigger) your migraine headaches.  Follow these instructions at home:  Medicines  · Take over-the-counter and prescription medicines only as told by your doctor.  · Do not drive or use heavy machinery while taking prescription pain medicine.  · To prevent or treat constipation while you are taking prescription pain medicine, your doctor may recommend that you:  ¨ Drink enough fluid to keep your pee (urine) clear or pale yellow.  ¨ Take over-the-counter or prescription medicines.  ¨ Eat foods that are high in fiber. These include fresh fruits and vegetables, whole grains, and beans.  ¨ Limit foods that are high in fat and processed sugars. These include fried and sweet foods.  Lifestyle  · Avoid alcohol.  · Do not use any products that contain nicotine or tobacco, such as cigarettes and e-cigarettes. If you need help quitting, ask your doctor.  · Get at least 8 hours of sleep every night.  · Limit your stress.  General instructions  · Keep a journal to find out what may bring on your migraines. For example, write down:  ¨ What you eat and drink.  ¨ How much sleep you get.  ¨ Any change in what you eat or drink.  ¨ Any change in your medicines.  · If you have a migraine:  ¨ Avoid things that make your symptoms worse, such as bright lights.  ¨ It may help to lie down in a dark, quiet room.  ¨ Do not drive or use heavy machinery.  ¨ Ask your doctor what activities are safe for you.  · Keep all follow-up visits as told by your doctor. This is important.  Contact a doctor if:  · You get a migraine that is different or worse than your usual migraines.  Get help right away if:  · Your migraine gets very bad.  · You have a fever.  · You have a stiff neck.  · You have trouble seeing.  · Your muscles feel weak or like you  cannot control them.  · You start to lose your balance a lot.  · You start to have trouble walking.  · You pass out (faint).  This information is not intended to replace advice given to you by your health care provider. Make sure you discuss any questions you have with your health care provider.  Document Released: 09/26/2009 Document Revised: 07/07/2017 Document Reviewed: 06/05/2017  [employer] Interactive Patient Education © 2017 [employer]    Discharge Instructions    Discharged to home by car with relative. Discharged via wheelchair, hospital escort: Yes.  Special equipment needed: Not Applicable    Be sure to schedule a follow-up appointment with your primary care doctor or any specialists as instructed.     Discharge Plan:   Diet Plan: Discussed  Activity Level: Discussed  Confirmed Follow up Appointment: Patient to Call and Schedule Appointment  Confirmed Symptoms Management: Discussed  Medication Reconciliation Updated: Yes  Influenza Vaccine Indication: Not indicated: Previously immunized this influenza season and > 8 years of age    I understand that a diet low in cholesterol, fat, and sodium is recommended for good health. Unless I have been given specific instructions below for another diet, I accept this instruction as my diet prescription.   Other diet: Regular    Special Instructions: None    · Is patient discharged on Warfarin / Coumadin?   No     Depression / Suicide Risk    As you are discharged from this [facility] facility, it is important to learn how to keep safe from harming yourself.    Recognize the warning signs:  · Abrupt changes in personality, positive or negative- including increase in energy   · Giving away possessions  · Change in eating patterns- significant weight changes-  positive or negative  · Change in sleeping patterns- unable to sleep or sleeping all the time   · Unwillingness or inability to communicate  · Depression  · Unusual sadness, discouragement and  loneliness  · Talk of wanting to die  · Neglect of personal appearance   · Rebelliousness- reckless behavior  · Withdrawal from people/activities they love  · Confusion- inability to concentrate     If you or a loved one observes any of these behaviors or has concerns about self-harm, here's what you can do:  · Talk about it- your feelings and reasons for harming yourself  · Remove any means that you might use to hurt yourself (examples: pills, rope, extension cords, firearm)  · Get professional help from the community (Mental Health, Substance Abuse, psychological counseling)  · Do not be alone:Call your Safe Contact- someone whom you trust who will be there for you.  · Call your local CRISIS HOTLINE 915-2786 or 000-272-6626  · Call your local Children's Mobile Crisis Response Team [place] (938) 915-9956 or www.LoSo  · Call the toll free National Suicide Prevention Hotlines   · National Suicide Prevention Lifeline 990-121-TLIG (5293)  · National Hope Line Network 800-SUICIDE (539-6636)

## 2019-01-18 NOTE — PROGRESS NOTES
"Assumed care of Pt at 0700 after report from [employer], assessment complete.  Pt resting comfortably with  at bedside, A & O X 4, VSS but , Pt c/o constant 9/10 HA - \"nothing can break the cycle.\"  Pt updated on and understands POC; medicated per MAR, given breakfast tray.  Bed in low position, call light within reach - will continue to monitor.    "

## 2019-01-18 NOTE — PROGRESS NOTES
[facility] Medicine Daily Progress Note    Date of Service  1/18/2019    Chief Complaint  48 y.o. female admitted 1/17/2019 with intractable migraine headache    Hospital Course    Patient admitted for fourth time this month due to intractable pain stemming from a chronic migraine for which she has had an acute exacerbation.  Outpatient Butrans was recently stopped by the patient only to restart due to recurrence of the pain.  She has had multiple visits to the emergency room for therapy including Depakote, ketamine, and now DHE and Dilaudid with still poor control.       Interval Problem Update  Intractable migraine-- multiple conversations with the patient's outpatient provider have come to the recommendation of continuing the Butrans 7.5 with added Dilaudid; continue Keppra, Toradol, Benadryl, duloxetine; MRI brain is normal    Patient reports pain score of 8 out of 10 and is tearful.  Constant.  Throbbing. radiates to the right eye  Transaminitis-- this began after the patient restarted her Depakote January 13; liver enzymes were normal at that time; patient reports this is her third episode of transaminitis stemming from Depakote; trend  History of pseudo-tumor cerebri-- shunt setting confirmed at 1.5; post MRI its at 1.25.  Discussed with [facility] neurosurgery nurse practitioners who have recommended resetting at 1.5.  Completed and confirmed on multiple readings; no evidence of hydrocephalus on CT or MR    Consultants/Specialty  Neurology pending    Code Status  Full    Disposition  Home    Review of Systems  Review of Systems   Constitutional: Negative for chills, diaphoresis, fever, malaise/fatigue and weight loss.   Gastrointestinal: Negative for nausea and vomiting.   Neurological: Positive for headaches.   All other systems reviewed and are negative.       Physical Exam  Temp:  [36.2 °C (97.2 °F)-37.3 °C (99.2 °F)] 36.2 °C (97.2 °F)  Pulse:  [106-120] 106  Resp:  [16-18] 16  BP: (113-146)/(59-90)  113/59  SpO2:  [90 %-100 %] 99 %    Physical Exam   Constitutional: She is oriented to person, place, and time. She appears well-developed and well-nourished. No distress.    shunt right side   HENT:   Head: Normocephalic and atraumatic.   Eyes: Pupils are equal, round, and reactive to light. EOM are normal.   Neck: Neck supple.   Cardiovascular: Normal rate, regular rhythm and normal heart sounds.    Pulmonary/Chest: Effort normal and breath sounds normal. No respiratory distress. She has no wheezes. She has no rales.   Abdominal: Soft. She exhibits no distension. There is no tenderness.   Neurological: She is alert and oriented to person, place, and time. No cranial nerve deficit.   Skin: Skin is warm and dry.       Fluids  No intake or output data in the 24 hours ending 01/18/19 1503    Laboratory  Recent Labs      01/18/19   0030   WBC  6.1   RBC  4.20   HEMOGLOBIN  13.5   HEMATOCRIT  39.9   MCV  95.0   MCH  32.1   MCHC  33.8   RDW  45.6   PLATELETCT  335   MPV  10.3     Recent Labs      01/18/19   0030   SODIUM  135   POTASSIUM  3.3*   CHLORIDE  106   CO2  24   GLUCOSE  70   BUN  6*   CREATININE  0.61   CALCIUM  9.3                   Imaging  MR-BRAIN-W/O   Final Result      1.  No acute abnormality.   2.  There is a ventriculostomy catheter coursing through the right frontal lobe and with its tip in the left lateral ventricle. There is no hydrocephalus.   3.  Minimal chronic white matter T2 hyperintensities likely representing nonspecific foci of gliosis/minimal microvascular ischemic disease.      DX-CHEST-LIMITED (1 VIEW)   Final Result      1.  Visualized shunt tubing shows no discontinuity.   2.  Lungs are clear.      DX-SKULL-LIMITED 3-   Final Result      No discontinuity of the visualized shunt tubing.      [id number] VIEW   Final Result      1.  Tip of the shunt catheter projects over the left hemiabdomen. It has moved since prior study.   2.  Nonobstructive bowel gas pattern.      CT-HEAD W/O    Final Result      1.  No CT evidence of acute infarct, hemorrhage or mass.   2.  Stable right  shunt catheter position. Stable ventricular size. No hydrocephalus.           Assessment/Plan  * Intractable migraine- (present on admission)   Assessment & Plan    Shunt series done in Er unrmearkable   Will hold home depakote given elevation in LFTS   Trial on Ketamine dose--ineffective  CT head unremarkable   MRI brain ordered and pending   Hx of autoimmune cerebritis? Hold steroids for now Defer to neuro recs  DHE with Zofran and Benadryl ineffective  Discussed with Erika Reece--outpatient PMR APRN-- okay to add Dilaudid 2 existing Butrans patch and titrate     Seizures (HCC)- (present on admission)   Assessment & Plan    Seizure precautions ordered  Hold home depakote given elevation in lfts   keppra and neurontin to be continued      S/P  shunt- (present on admission)   Assessment & Plan    Due to hx of pseudotumor cerebri  Shunt series unremarkable  1/18/2019-- BP shunt setting re-evaluated and reset to 1.5 per neurosurgery recommendations     Class 1 obesity in adult- (present on admission)   Assessment & Plan    Weight loss and exercise recommended     Skin lesions- (present on admission)   Assessment & Plan    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   Has had previious biopsies   Needs outpatient derm follow up      Abnormal LFTs- (present on admission)   Assessment & Plan    Cont to trend   Suspected due to depakote usage  Hold home depakote for now      Rheumatoid arthritis (HCC)- (present on admission)   Assessment & Plan    Resume home plaquenil      Hypomagnesemia- (present on admission)   Assessment & Plan    IV replacement  Trend     Hypokalemia- (present on admission)   Assessment & Plan    Replace and recheck           VTE prophylaxis: Lovenox    I saw and evaluated the patient.Discussed with PEDRO JULIEN and agree with nurse APN's  findings and plan as documented in the APN's' note.

## 2019-01-18 NOTE — ASSESSMENT & PLAN NOTE
Shunt series done in Er unrmearkable   Will hold home depakote given elevation in LFTS   Trial on Ketamine dose--ineffective  CT head unremarkable   MRI brain ordered and pending   Hx of autoimmune cerebritis? Hold steroids for now Defer to neuro recs  DHE with Zofran and Benadryl ineffective  Discussed with Erika Reece--outpatient PMR APRN-- okay to add Dilaudid 2 existing Butrans patch and titrate

## 2019-01-18 NOTE — DISCHARGE PLANNING
Care Transition Team Assessment    Spoke with patient at bedside, Lives in single story house with 3 steps to enter.  assist with steps. Hx of falls at times. Has Walker and cane. Gets Migraine Tx q6 weeks. Anticipate no needs @ present time. Spouse will be ride @ D/C.    Information Source  Orientation : Oriented x 4  Information Given By: Patient    Readmission Evaluation  Is this a readmission?: No    Interdisciplinary Discharge Planning  Does Admitting Nurse Feel This Could be a Complex Discharge?: No  Primary Care Physician: Dev  Lives with - Patient's Self Care Capacity: Spouse  Patient or legal guardian wants to designate a caregiver (see row info): Yes  Caregiver name: promise  Caregiver relationship to patient:   Caregiver contact info: [phone]  Support Systems: Spouse / Significant Other  Housing / Facility: [address] [facility]  Do You Take your Prescribed Medications Regularly: Yes  Able to Return to Previous ADL's: Yes  Mobility Issues: Yes  Prior Services: None  Patient Expects to be Discharged to:: Home  Assistance Needed: No  Durable Medical Equipment: Walker    Discharge Preparedness  What are your discharge supports?: Spouse  Prior Functional Level: Uses Cane, Uses Walker  Difficulity with ADLs: None    Functional Assesment  Prior Functional Level: Uses Cane, Uses Walker    Finances  Prescription Coverage: Yes    Anticipated Discharge Information  Anticipated discharge disposition: Home  Discharge Address: [address] [place] [address]  Discharge Contact Phone Number: [phone]

## 2019-01-18 NOTE — PROGRESS NOTES
A/o,assessment completed,poc discussed,verbalized understanding, at bedside, call button within reach,pt nauseated,small emesis,medicated prn,unable to give oral meds at this time,will continue to monitor.

## 2019-01-19 VITALS
DIASTOLIC BLOOD PRESSURE: 64 MMHG | TEMPERATURE: 97.7 F | WEIGHT: 170.64 LBS | OXYGEN SATURATION: 98 % | BODY MASS INDEX: 30.23 KG/M2 | HEART RATE: 99 BPM | RESPIRATION RATE: 16 BRPM | SYSTOLIC BLOOD PRESSURE: 113 MMHG | HEIGHT: 63 IN

## 2019-01-19 LAB
ALBUMIN SERPL BCP-MCNC: 3.3 G/DL (ref 3.2–4.9)
ALBUMIN/GLOB SERPL: 1.7 G/DL
ALP SERPL-CCNC: 145 U/L (ref 30–99)
ALT SERPL-CCNC: 383 U/L (ref 2–50)
ANION GAP SERPL CALC-SCNC: 7 MMOL/L (ref 0–11.9)
AST SERPL-CCNC: 114 U/L (ref 12–45)
BASOPHILS # BLD AUTO: 1.2 % (ref 0–1.8)
BASOPHILS # BLD: 0.06 K/UL (ref 0–0.12)
BILIRUB SERPL-MCNC: 0.5 MG/DL (ref 0.1–1.5)
BUN SERPL-MCNC: 4 MG/DL (ref 8–22)
CALCIUM SERPL-MCNC: 8.2 MG/DL (ref 8.5–10.5)
CHLORIDE SERPL-SCNC: 111 MMOL/L (ref 96–112)
CO2 SERPL-SCNC: 24 MMOL/L (ref 20–33)
CREAT SERPL-MCNC: 0.51 MG/DL (ref 0.5–1.4)
EOSINOPHIL # BLD AUTO: 0.09 K/UL (ref 0–0.51)
EOSINOPHIL NFR BLD: 1.8 % (ref 0–6.9)
ERYTHROCYTE [DISTWIDTH] IN BLOOD BY AUTOMATED COUNT: 50 FL (ref 35.9–50)
GLOBULIN SER CALC-MCNC: 1.9 G/DL (ref 1.9–3.5)
GLUCOSE SERPL-MCNC: 86 MG/DL (ref 65–99)
HCT VFR BLD AUTO: 36.3 % (ref 37–47)
HGB BLD-MCNC: 11.8 G/DL (ref 12–16)
IMM GRANULOCYTES # BLD AUTO: 0.02 K/UL (ref 0–0.11)
IMM GRANULOCYTES NFR BLD AUTO: 0.4 % (ref 0–0.9)
LYMPHOCYTES # BLD AUTO: 1.56 K/UL (ref 1–4.8)
LYMPHOCYTES NFR BLD: 30.5 % (ref 22–41)
MAGNESIUM SERPL-MCNC: 1.9 MG/DL (ref 1.5–2.5)
MCH RBC QN AUTO: 32.3 PG (ref 27–33)
MCHC RBC AUTO-ENTMCNC: 32.5 G/DL (ref 33.6–35)
MCV RBC AUTO: 99.5 FL (ref 81.4–97.8)
MONOCYTES # BLD AUTO: 0.49 K/UL (ref 0–0.85)
MONOCYTES NFR BLD AUTO: 9.6 % (ref 0–13.4)
NEUTROPHILS # BLD AUTO: 2.89 K/UL (ref 2–7.15)
NEUTROPHILS NFR BLD: 56.5 % (ref 44–72)
NRBC # BLD AUTO: 0 K/UL
NRBC BLD-RTO: 0 /100 WBC
PLATELET # BLD AUTO: 262 K/UL (ref 164–446)
PMV BLD AUTO: 10 FL (ref 9–12.9)
POTASSIUM SERPL-SCNC: 4 MMOL/L (ref 3.6–5.5)
PROT SERPL-MCNC: 5.2 G/DL (ref 6–8.2)
RBC # BLD AUTO: 3.65 M/UL (ref 4.2–5.4)
SODIUM SERPL-SCNC: 142 MMOL/L (ref 135–145)
WBC # BLD AUTO: 5.1 K/UL (ref 4.8–10.8)

## 2019-01-19 PROCEDURE — G0378 HOSPITAL OBSERVATION PER HR: HCPCS

## 2019-01-19 PROCEDURE — 99217 PR OBSERVATION CARE DISCHARGE: CPT | Performed by: HOSPITALIST

## 2019-01-19 PROCEDURE — 85025 COMPLETE CBC W/AUTO DIFF WBC: CPT

## 2019-01-19 PROCEDURE — A9270 NON-COVERED ITEM OR SERVICE: HCPCS | Performed by: HOSPITALIST

## 2019-01-19 PROCEDURE — 96372 THER/PROPH/DIAG INJ SC/IM: CPT

## 2019-01-19 PROCEDURE — 700105 HCHG RX REV CODE 258: Performed by: HOSPITALIST

## 2019-01-19 PROCEDURE — 700102 HCHG RX REV CODE 250 W/ 637 OVERRIDE(OP): Performed by: HOSPITALIST

## 2019-01-19 PROCEDURE — 700102 HCHG RX REV CODE 250 W/ 637 OVERRIDE(OP): Performed by: NURSE PRACTITIONER

## 2019-01-19 PROCEDURE — 700111 HCHG RX REV CODE 636 W/ 250 OVERRIDE (IP): Performed by: HOSPITALIST

## 2019-01-19 PROCEDURE — 96376 TX/PRO/DX INJ SAME DRUG ADON: CPT

## 2019-01-19 PROCEDURE — 83735 ASSAY OF MAGNESIUM: CPT

## 2019-01-19 PROCEDURE — 80053 COMPREHEN METABOLIC PANEL: CPT

## 2019-01-19 PROCEDURE — 700111 HCHG RX REV CODE 636 W/ 250 OVERRIDE (IP): Performed by: NURSE PRACTITIONER

## 2019-01-19 PROCEDURE — A9270 NON-COVERED ITEM OR SERVICE: HCPCS | Performed by: NURSE PRACTITIONER

## 2019-01-19 RX ORDER — HYDROMORPHONE HYDROCHLORIDE 2 MG/1
2 TABLET ORAL
Qty: 21 TAB | Refills: 0 | Status: SHIPPED | OUTPATIENT
Start: 2019-01-19 | End: 2019-01-26

## 2019-01-19 RX ADMIN — ENOXAPARIN SODIUM 40 MG: 100 INJECTION SUBCUTANEOUS at 05:04

## 2019-01-19 RX ADMIN — KETOROLAC TROMETHAMINE 30 MG: 30 INJECTION, SOLUTION INTRAMUSCULAR; INTRAVENOUS at 10:55

## 2019-01-19 RX ADMIN — KETOROLAC TROMETHAMINE 30 MG: 30 INJECTION, SOLUTION INTRAMUSCULAR; INTRAVENOUS at 05:04

## 2019-01-19 RX ADMIN — KETOROLAC TROMETHAMINE 30 MG: 30 INJECTION, SOLUTION INTRAMUSCULAR; INTRAVENOUS at 00:04

## 2019-01-19 RX ADMIN — HYDROMORPHONE HYDROCHLORIDE 1 MG: 2 INJECTION INTRAMUSCULAR; INTRAVENOUS; SUBCUTANEOUS at 05:04

## 2019-01-19 RX ADMIN — LEVETIRACETAM 1500 MG: 500 TABLET ORAL at 05:06

## 2019-01-19 RX ADMIN — GABAPENTIN 600 MG: 300 CAPSULE ORAL at 10:55

## 2019-01-19 RX ADMIN — DIPHENHYDRAMINE HYDROCHLORIDE 50 MG: 50 INJECTION INTRAMUSCULAR; INTRAVENOUS at 05:04

## 2019-01-19 RX ADMIN — RISPERIDONE 1 MG: 1 TABLET ORAL at 05:07

## 2019-01-19 RX ADMIN — DIPHENHYDRAMINE HYDROCHLORIDE 50 MG: 50 INJECTION INTRAMUSCULAR; INTRAVENOUS at 00:04

## 2019-01-19 RX ADMIN — OMEPRAZOLE 20 MG: 20 CAPSULE, DELAYED RELEASE ORAL at 05:04

## 2019-01-19 RX ADMIN — GABAPENTIN 600 MG: 300 CAPSULE ORAL at 05:05

## 2019-01-19 RX ADMIN — HYDROMORPHONE HYDROCHLORIDE 1 MG: 2 INJECTION INTRAMUSCULAR; INTRAVENOUS; SUBCUTANEOUS at 01:47

## 2019-01-19 RX ADMIN — Medication 500 UNITS: at 11:04

## 2019-01-19 RX ADMIN — SODIUM CHLORIDE: 9 INJECTION, SOLUTION INTRAVENOUS at 04:00

## 2019-01-19 RX ADMIN — DIPHENHYDRAMINE HYDROCHLORIDE 50 MG: 50 INJECTION INTRAMUSCULAR; INTRAVENOUS at 10:55

## 2019-01-19 RX ADMIN — ASPIRIN 81 MG 81 MG: 81 TABLET ORAL at 05:04

## 2019-01-19 RX ADMIN — DULOXETINE HYDROCHLORIDE 60 MG: 60 CAPSULE, DELAYED RELEASE ORAL at 05:06

## 2019-01-19 RX ADMIN — HYDROXYCHLOROQUINE SULFATE 400 MG: 200 TABLET, FILM COATED ORAL at 05:06

## 2019-01-19 RX ADMIN — HYDROMORPHONE HYDROCHLORIDE 1 MG: 2 INJECTION INTRAMUSCULAR; INTRAVENOUS; SUBCUTANEOUS at 08:17

## 2019-01-19 ASSESSMENT — PAIN SCALES - GENERAL: PAINLEVEL_OUTOF10: 5

## 2019-01-19 NOTE — PROGRESS NOTES
Pt resting in bed.   at bed side.  Call light in reach.  Complaining of headache.  Prn medication given.  Neuro checks negative.  No distress noted.  Iv infusing.  Will monitor

## 2019-01-19 NOTE — PROGRESS NOTES
Pt medicated per MAR; given and understands discharge instructions, (2) Rx.  R sided port de-accessed, flushed with heparin.  Pt to  parking via ; discharged home with family.

## 2019-01-19 NOTE — PROGRESS NOTES
Assumed care of Pt at 0700 after report from CAROLINA Denise, assessment complete.  Pt resting comfortably, A & O X 4, VSS; Pt states pain is now 5/10 compared to 9/10 yesterday.  Pt updated on and understands POC for discharge home with pain medications.  Pt medicated per MAR, wants to wait to discharge until next dose of benadryl and toradol at 1100.  Pt given breakfast tray; bed in low position, call light within reach - will continue to monitor.

## 2019-01-19 NOTE — PROGRESS NOTES
Pt resting in bed.  Ice pack on head.  No distress noted.  Call light in reach.   at bedside.  Bed in low position.

## 2019-05-27 ENCOUNTER — HOSPITAL ENCOUNTER (EMERGENCY)
Facility: MEDICAL CENTER | Age: 49
End: 2019-05-27
Attending: EMERGENCY MEDICINE
Payer: MEDICARE

## 2019-05-27 VITALS
HEIGHT: 63 IN | TEMPERATURE: 98.4 F | BODY MASS INDEX: 29.77 KG/M2 | DIASTOLIC BLOOD PRESSURE: 76 MMHG | OXYGEN SATURATION: 96 % | HEART RATE: 85 BPM | RESPIRATION RATE: 16 BRPM | SYSTOLIC BLOOD PRESSURE: 129 MMHG | WEIGHT: 167.99 LBS

## 2019-05-27 DIAGNOSIS — R51.9 INTRACTABLE HEADACHE, UNSPECIFIED CHRONICITY PATTERN, UNSPECIFIED HEADACHE TYPE: ICD-10-CM

## 2019-05-27 DIAGNOSIS — A49.02 MRSA (METHICILLIN RESISTANT STAPHYLOCOCCUS AUREUS): ICD-10-CM

## 2019-05-27 LAB
ALBUMIN SERPL BCP-MCNC: 4.5 G/DL (ref 3.2–4.9)
ALBUMIN/GLOB SERPL: 1.6 G/DL
ALP SERPL-CCNC: 113 U/L (ref 30–99)
ALT SERPL-CCNC: 17 U/L (ref 2–50)
ANION GAP SERPL CALC-SCNC: 10 MMOL/L (ref 0–11.9)
AST SERPL-CCNC: 17 U/L (ref 12–45)
BASOPHILS # BLD AUTO: 0.6 % (ref 0–1.8)
BASOPHILS # BLD: 0.05 K/UL (ref 0–0.12)
BILIRUB SERPL-MCNC: 0.5 MG/DL (ref 0.1–1.5)
BUN SERPL-MCNC: 8 MG/DL (ref 8–22)
CALCIUM SERPL-MCNC: 9.3 MG/DL (ref 8.5–10.5)
CHLORIDE SERPL-SCNC: 100 MMOL/L (ref 96–112)
CO2 SERPL-SCNC: 21 MMOL/L (ref 20–33)
CREAT SERPL-MCNC: 0.69 MG/DL (ref 0.5–1.4)
EOSINOPHIL # BLD AUTO: 0 K/UL (ref 0–0.51)
EOSINOPHIL NFR BLD: 0 % (ref 0–6.9)
ERYTHROCYTE [DISTWIDTH] IN BLOOD BY AUTOMATED COUNT: 39.6 FL (ref 35.9–50)
GLOBULIN SER CALC-MCNC: 2.8 G/DL (ref 1.9–3.5)
GLUCOSE SERPL-MCNC: 97 MG/DL (ref 65–99)
HCT VFR BLD AUTO: 42.1 % (ref 37–47)
HGB BLD-MCNC: 14.3 G/DL (ref 12–16)
IMM GRANULOCYTES # BLD AUTO: 0.03 K/UL (ref 0–0.11)
IMM GRANULOCYTES NFR BLD AUTO: 0.4 % (ref 0–0.9)
LYMPHOCYTES # BLD AUTO: 1.25 K/UL (ref 1–4.8)
LYMPHOCYTES NFR BLD: 15 % (ref 22–41)
MCH RBC QN AUTO: 31.1 PG (ref 27–33)
MCHC RBC AUTO-ENTMCNC: 34 G/DL (ref 33.6–35)
MCV RBC AUTO: 91.5 FL (ref 81.4–97.8)
MONOCYTES # BLD AUTO: 0.46 K/UL (ref 0–0.85)
MONOCYTES NFR BLD AUTO: 5.5 % (ref 0–13.4)
NEUTROPHILS # BLD AUTO: 6.56 K/UL (ref 2–7.15)
NEUTROPHILS NFR BLD: 78.5 % (ref 44–72)
NRBC # BLD AUTO: 0 K/UL
NRBC BLD-RTO: 0 /100 WBC
PLATELET # BLD AUTO: 392 K/UL (ref 164–446)
PMV BLD AUTO: 9.6 FL (ref 9–12.9)
POTASSIUM SERPL-SCNC: 3.7 MMOL/L (ref 3.6–5.5)
PROT SERPL-MCNC: 7.3 G/DL (ref 6–8.2)
RBC # BLD AUTO: 4.6 M/UL (ref 4.2–5.4)
SODIUM SERPL-SCNC: 131 MMOL/L (ref 135–145)
WBC # BLD AUTO: 8.4 K/UL (ref 4.8–10.8)

## 2019-05-27 PROCEDURE — 96374 THER/PROPH/DIAG INJ IV PUSH: CPT

## 2019-05-27 PROCEDURE — 700111 HCHG RX REV CODE 636 W/ 250 OVERRIDE (IP)

## 2019-05-27 PROCEDURE — 99284 EMERGENCY DEPT VISIT MOD MDM: CPT

## 2019-05-27 PROCEDURE — 96375 TX/PRO/DX INJ NEW DRUG ADDON: CPT

## 2019-05-27 PROCEDURE — 80053 COMPREHEN METABOLIC PANEL: CPT

## 2019-05-27 PROCEDURE — 96376 TX/PRO/DX INJ SAME DRUG ADON: CPT

## 2019-05-27 PROCEDURE — 700105 HCHG RX REV CODE 258: Performed by: EMERGENCY MEDICINE

## 2019-05-27 PROCEDURE — 85025 COMPLETE CBC W/AUTO DIFF WBC: CPT

## 2019-05-27 PROCEDURE — 700111 HCHG RX REV CODE 636 W/ 250 OVERRIDE (IP): Performed by: EMERGENCY MEDICINE

## 2019-05-27 RX ORDER — DIVALPROEX SODIUM 500 MG/1
500 TABLET, EXTENDED RELEASE ORAL 3 TIMES DAILY
Status: ON HOLD | COMMUNITY
End: 2019-10-29

## 2019-05-27 RX ORDER — KETOROLAC TROMETHAMINE 30 MG/ML
30 INJECTION, SOLUTION INTRAMUSCULAR; INTRAVENOUS ONCE
Status: COMPLETED | OUTPATIENT
Start: 2019-05-27 | End: 2019-05-27

## 2019-05-27 RX ORDER — SODIUM CHLORIDE 9 MG/ML
1000 INJECTION, SOLUTION INTRAVENOUS ONCE
Status: COMPLETED | OUTPATIENT
Start: 2019-05-27 | End: 2019-05-27

## 2019-05-27 RX ORDER — OXYCODONE HYDROCHLORIDE 10 MG/1
10 TABLET ORAL EVERY 4 HOURS PRN
COMMUNITY
End: 2019-09-10

## 2019-05-27 RX ORDER — DEXAMETHASONE SODIUM PHOSPHATE 4 MG/ML
4 INJECTION, SOLUTION INTRA-ARTICULAR; INTRALESIONAL; INTRAMUSCULAR; INTRAVENOUS; SOFT TISSUE ONCE
Status: COMPLETED | OUTPATIENT
Start: 2019-05-27 | End: 2019-05-27

## 2019-05-27 RX ORDER — SULFAMETHOXAZOLE AND TRIMETHOPRIM 800; 160 MG/1; MG/1
1 TABLET ORAL 2 TIMES DAILY
Qty: 14 TAB | Refills: 0 | Status: SHIPPED | OUTPATIENT
Start: 2019-05-27 | End: 2019-06-17

## 2019-05-27 RX ORDER — HEPARIN SODIUM,PORCINE 10 UNIT/ML
20 VIAL (ML) INTRAVENOUS EVERY 6 HOURS
Status: DISCONTINUED | OUTPATIENT
Start: 2019-05-27 | End: 2019-05-27

## 2019-05-27 RX ORDER — MORPHINE SULFATE 4 MG/ML
4 INJECTION, SOLUTION INTRAMUSCULAR; INTRAVENOUS ONCE
Status: COMPLETED | OUTPATIENT
Start: 2019-05-27 | End: 2019-05-27

## 2019-05-27 RX ORDER — METOCLOPRAMIDE HYDROCHLORIDE 5 MG/ML
10 INJECTION INTRAMUSCULAR; INTRAVENOUS ONCE
Status: COMPLETED | OUTPATIENT
Start: 2019-05-27 | End: 2019-05-27

## 2019-05-27 RX ORDER — KETOROLAC TROMETHAMINE 30 MG/ML
15 INJECTION, SOLUTION INTRAMUSCULAR; INTRAVENOUS EVERY 6 HOURS PRN
COMMUNITY
End: 2019-08-31

## 2019-05-27 RX ADMIN — MORPHINE SULFATE 4 MG: 4 INJECTION INTRAVENOUS at 19:36

## 2019-05-27 RX ADMIN — HEPARIN 500 UNITS: 100 SYRINGE at 20:12

## 2019-05-27 RX ADMIN — KETOROLAC TROMETHAMINE 30 MG: 30 INJECTION, SOLUTION INTRAMUSCULAR; INTRAVENOUS at 19:36

## 2019-05-27 RX ADMIN — METOCLOPRAMIDE 10 MG: 5 INJECTION, SOLUTION INTRAMUSCULAR; INTRAVENOUS at 18:21

## 2019-05-27 RX ADMIN — DEXAMETHASONE SODIUM PHOSPHATE 4 MG: 4 INJECTION, SOLUTION INTRA-ARTICULAR; INTRALESIONAL; INTRAMUSCULAR; INTRAVENOUS; SOFT TISSUE at 18:21

## 2019-05-27 RX ADMIN — SODIUM CHLORIDE 1000 ML: 9 INJECTION, SOLUTION INTRAVENOUS at 18:20

## 2019-05-27 RX ADMIN — MORPHINE SULFATE 4 MG: 4 INJECTION INTRAVENOUS at 18:20

## 2019-05-27 NOTE — ED TRIAGE NOTES
Chief Complaint   Patient presents with   • Migraine     7 days   • Wound Infection       Patient ambulatory to triage with steady gait. States she has a stimulator placed for her migraines on 4/22 but it got infected and removed. Since then patient has open sores on left hand and right breast. History of staph. Patient has had a migraine for the last 7 days and unable to get rid of it with home medications. Patient has bilateral dilated pupils in triage that are reactive to light. Patient has port in placed for access. It was last access 5/1. Redness around port site.     Patient placed out in lobby and educated on triage process.

## 2019-05-28 ENCOUNTER — HOSPITAL ENCOUNTER (EMERGENCY)
Facility: MEDICAL CENTER | Age: 49
End: 2019-05-28
Attending: EMERGENCY MEDICINE
Payer: MEDICARE

## 2019-05-28 VITALS
TEMPERATURE: 96.9 F | RESPIRATION RATE: 18 BRPM | DIASTOLIC BLOOD PRESSURE: 87 MMHG | HEIGHT: 63 IN | BODY MASS INDEX: 30.39 KG/M2 | HEART RATE: 83 BPM | OXYGEN SATURATION: 96 % | WEIGHT: 171.52 LBS | SYSTOLIC BLOOD PRESSURE: 137 MMHG

## 2019-05-28 DIAGNOSIS — G43.419 INTRACTABLE HEMIPLEGIC MIGRAINE WITHOUT STATUS MIGRAINOSUS: ICD-10-CM

## 2019-05-28 LAB
ANION GAP SERPL CALC-SCNC: 14 MMOL/L (ref 0–11.9)
APTT PPP: 44 SEC (ref 24.7–36)
BASOPHILS # BLD AUTO: 0.7 % (ref 0–1.8)
BASOPHILS # BLD: 0.06 K/UL (ref 0–0.12)
BUN SERPL-MCNC: 10 MG/DL (ref 8–22)
CALCIUM SERPL-MCNC: 8.7 MG/DL (ref 8.5–10.5)
CHLORIDE SERPL-SCNC: 96 MMOL/L (ref 96–112)
CO2 SERPL-SCNC: 17 MMOL/L (ref 20–33)
CREAT SERPL-MCNC: 0.63 MG/DL (ref 0.5–1.4)
EOSINOPHIL # BLD AUTO: 0.03 K/UL (ref 0–0.51)
EOSINOPHIL NFR BLD: 0.4 % (ref 0–6.9)
ERYTHROCYTE [DISTWIDTH] IN BLOOD BY AUTOMATED COUNT: 41.6 FL (ref 35.9–50)
GLUCOSE BLD-MCNC: 58 MG/DL (ref 65–99)
GLUCOSE BLD-MCNC: 93 MG/DL (ref 65–99)
GLUCOSE SERPL-MCNC: 97 MG/DL (ref 65–99)
HCT VFR BLD AUTO: 38.1 % (ref 37–47)
HGB BLD-MCNC: 13.1 G/DL (ref 12–16)
IMM GRANULOCYTES # BLD AUTO: 0.06 K/UL (ref 0–0.11)
IMM GRANULOCYTES NFR BLD AUTO: 0.7 % (ref 0–0.9)
INR PPP: 1.11 (ref 0.87–1.13)
LYMPHOCYTES # BLD AUTO: 3 K/UL (ref 1–4.8)
LYMPHOCYTES NFR BLD: 35.2 % (ref 22–41)
MCH RBC QN AUTO: 32.3 PG (ref 27–33)
MCHC RBC AUTO-ENTMCNC: 34.4 G/DL (ref 33.6–35)
MCV RBC AUTO: 94.1 FL (ref 81.4–97.8)
MONOCYTES # BLD AUTO: 0.59 K/UL (ref 0–0.85)
MONOCYTES NFR BLD AUTO: 6.9 % (ref 0–13.4)
NEUTROPHILS # BLD AUTO: 4.79 K/UL (ref 2–7.15)
NEUTROPHILS NFR BLD: 56.1 % (ref 44–72)
NRBC # BLD AUTO: 0 K/UL
NRBC BLD-RTO: 0 /100 WBC
PLATELET # BLD AUTO: 297 K/UL (ref 164–446)
PMV BLD AUTO: 9.8 FL (ref 9–12.9)
POTASSIUM SERPL-SCNC: 3.2 MMOL/L (ref 3.6–5.5)
PROTHROMBIN TIME: 14.4 SEC (ref 12–14.6)
RBC # BLD AUTO: 4.05 M/UL (ref 4.2–5.4)
SODIUM SERPL-SCNC: 127 MMOL/L (ref 135–145)
VALPROATE SERPL-MCNC: 39.8 UG/ML (ref 50–100)
WBC # BLD AUTO: 8.5 K/UL (ref 4.8–10.8)

## 2019-05-28 PROCEDURE — 700111 HCHG RX REV CODE 636 W/ 250 OVERRIDE (IP): Performed by: EMERGENCY MEDICINE

## 2019-05-28 PROCEDURE — 96374 THER/PROPH/DIAG INJ IV PUSH: CPT

## 2019-05-28 PROCEDURE — 82962 GLUCOSE BLOOD TEST: CPT | Mod: 91

## 2019-05-28 PROCEDURE — 85610 PROTHROMBIN TIME: CPT

## 2019-05-28 PROCEDURE — 96375 TX/PRO/DX INJ NEW DRUG ADDON: CPT

## 2019-05-28 PROCEDURE — 80164 ASSAY DIPROPYLACETIC ACD TOT: CPT

## 2019-05-28 PROCEDURE — 96376 TX/PRO/DX INJ SAME DRUG ADON: CPT

## 2019-05-28 PROCEDURE — 85025 COMPLETE CBC W/AUTO DIFF WBC: CPT

## 2019-05-28 PROCEDURE — 700101 HCHG RX REV CODE 250: Performed by: EMERGENCY MEDICINE

## 2019-05-28 PROCEDURE — 80048 BASIC METABOLIC PNL TOTAL CA: CPT

## 2019-05-28 PROCEDURE — 700105 HCHG RX REV CODE 258: Performed by: EMERGENCY MEDICINE

## 2019-05-28 PROCEDURE — 85730 THROMBOPLASTIN TIME PARTIAL: CPT

## 2019-05-28 PROCEDURE — 99284 EMERGENCY DEPT VISIT MOD MDM: CPT

## 2019-05-28 RX ORDER — VALPROATE SODIUM 100 MG/ML
500 INJECTION INTRAVENOUS ONCE
Status: COMPLETED | OUTPATIENT
Start: 2019-05-28 | End: 2019-05-28

## 2019-05-28 RX ORDER — SODIUM CHLORIDE 9 MG/ML
1000 INJECTION, SOLUTION INTRAVENOUS ONCE
Status: COMPLETED | OUTPATIENT
Start: 2019-05-28 | End: 2019-05-28

## 2019-05-28 RX ORDER — MAGNESIUM SULFATE HEPTAHYDRATE 40 MG/ML
2 INJECTION, SOLUTION INTRAVENOUS ONCE
Status: COMPLETED | OUTPATIENT
Start: 2019-05-28 | End: 2019-05-28

## 2019-05-28 RX ORDER — KETOROLAC TROMETHAMINE 30 MG/ML
15 INJECTION, SOLUTION INTRAMUSCULAR; INTRAVENOUS ONCE
Status: COMPLETED | OUTPATIENT
Start: 2019-05-28 | End: 2019-05-28

## 2019-05-28 RX ORDER — METOCLOPRAMIDE HYDROCHLORIDE 5 MG/ML
10 INJECTION INTRAMUSCULAR; INTRAVENOUS ONCE
Status: COMPLETED | OUTPATIENT
Start: 2019-05-28 | End: 2019-05-28

## 2019-05-28 RX ORDER — DIPHENHYDRAMINE HYDROCHLORIDE 50 MG/ML
25 INJECTION INTRAMUSCULAR; INTRAVENOUS ONCE
Status: COMPLETED | OUTPATIENT
Start: 2019-05-28 | End: 2019-05-28

## 2019-05-28 RX ORDER — MAGNESIUM SULFATE HEPTAHYDRATE 500 MG/ML
2 INJECTION, SOLUTION INTRAMUSCULAR; INTRAVENOUS ONCE
Status: DISCONTINUED | OUTPATIENT
Start: 2019-05-28 | End: 2019-05-28

## 2019-05-28 RX ORDER — DEXAMETHASONE SODIUM PHOSPHATE 4 MG/ML
4 INJECTION, SOLUTION INTRA-ARTICULAR; INTRALESIONAL; INTRAMUSCULAR; INTRAVENOUS; SOFT TISSUE ONCE
Status: COMPLETED | OUTPATIENT
Start: 2019-05-28 | End: 2019-05-28

## 2019-05-28 RX ADMIN — VALPROATE SODIUM 500 MG: 500 INJECTION INTRAVENOUS at 21:08

## 2019-05-28 RX ADMIN — VALPROATE SODIUM 500 MG: 500 INJECTION INTRAVENOUS at 20:01

## 2019-05-28 RX ADMIN — DEXAMETHASONE SODIUM PHOSPHATE 4 MG: 4 INJECTION, SOLUTION INTRA-ARTICULAR; INTRALESIONAL; INTRAMUSCULAR; INTRAVENOUS; SOFT TISSUE at 18:46

## 2019-05-28 RX ADMIN — KETAMINE HYDROCHLORIDE 25 MG: 10 INJECTION, SOLUTION INTRAMUSCULAR; INTRAVENOUS at 21:33

## 2019-05-28 RX ADMIN — HEPARIN 500 UNITS: 100 SYRINGE at 22:00

## 2019-05-28 RX ADMIN — SODIUM CHLORIDE 1000 ML: 9 INJECTION, SOLUTION INTRAVENOUS at 18:44

## 2019-05-28 RX ADMIN — MAGNESIUM SULFATE IN WATER 2 G: 40 INJECTION, SOLUTION INTRAVENOUS at 18:46

## 2019-05-28 RX ADMIN — METOCLOPRAMIDE 10 MG: 5 INJECTION, SOLUTION INTRAMUSCULAR; INTRAVENOUS at 18:45

## 2019-05-28 RX ADMIN — DIPHENHYDRAMINE HYDROCHLORIDE 25 MG: 50 INJECTION INTRAMUSCULAR; INTRAVENOUS at 18:44

## 2019-05-28 RX ADMIN — KETOROLAC TROMETHAMINE 15 MG: 30 INJECTION, SOLUTION INTRAMUSCULAR; INTRAVENOUS at 18:45

## 2019-05-28 NOTE — ED PROVIDER NOTES
ED Provider Note    Scribed for Dr. Eric Dillard M.D. by Karthik Aldana. 5/27/2019  5:36 PM    Primary care provider: Elliott Power M.D.  Means of arrival: Walk-in  History obtained from: patient  History limited by: None    CHIEF COMPLAINT  Chief Complaint   Patient presents with   • Migraine     7 days   • Wound Infection       HPI  Enedelia Pang is a 48 y.o. Female, with a history of migraines, who presents to the Emergency Department for evaluation of a migraine onset 7 days ago. Her migraine had a slow onset and has been constant since onset. The patient took Benadryl and Toradol injections at home with mild alleviation to his symptoms. The patient states she had a spinal cord stimulator placed on 4/22 to treat her chronic migraines but it was removed 2 weeks later after it became infected. The patient was put on Keflex and Bactrim to treat this infection and finished her course 4 days ago. Since finishing her course of antibiotics, the patient has developed several small open sores to her right thumb, left chest, and back. The patient was started on Doxycyline but was taken off of it after would cultures revealed she was positive for MRSA and Doxycyline resistant. The patient denies any fevers, vision changes, weakness, or one-sided deficits. No recent trauma.    REVIEW OF SYSTEMS  Pertinent positives include migraine and multiple open wounds. Pertinent negatives include no fevers, vision changes, weakness, or one-sided deficits. As above, all other systems reviewed and are negative.   See HPI for further details.     PAST MEDICAL HISTORY   has a past medical history of Allergy, unspecified not elsewhere classified; Anemia (10/05/2017); Anxiety; autoimmune; Depression; H/O Clostridium difficile infection; MRSA infection; Hydrocephalus; Migraine with aura, with intractable migraine, so stated, without mention of status migrainosus; MRSA (methicillin resistant Staphylococcus aureus); Pituitary  "abnormality (HCC); Staph infection; and Stroke (HCC).    SURGICAL HISTORY   has a past surgical history that includes cholecystectomy; tonsillectomy; appendectomy; tubal ligation; other; other neurological surg; irrigation & debridement neuro (N/A, 6/28/2015); lumbar exploration (N/A, 8/31/2015); spinal cord stimulator (12/19/2016); and  shunt insertion (5/31/2017).    SOCIAL HISTORY  Social History   Substance Use Topics   • Smoking status: Never Smoker   • Smokeless tobacco: Never Used   • Alcohol use Yes      Comment: Rare      History   Drug Use No       FAMILY HISTORY  Family History   Problem Relation Age of Onset   • No Known Problems Mother    • No Known Problems Father        CURRENT MEDICATIONS  Home Medications     Reviewed by Matthew Bruno (Pharmacy Tech) on 05/27/19 at 1924  Med List Status: Complete   Medication Last Dose Status   aspirin (ASA) 81 MG Chew Tab chewable tablet 5/27/2019 Active   Calcium Citrate-Vitamin D (CALCIUM + D PO) 5/27/2019 Active   diphenhydrAMINE (BENADRYL) 50 MG/ML Solution 5/27/2019 Active   divalproex ER (DEPAKOTE ER) 500 MG TABLET SR 24 HR 5/27/2019 Active   duloxetine (CYMBALTA) 60 MG CPEP delayed-release capsule 5/27/2019 Active   gabapentin (NEURONTIN) 600 MG tablet 5/27/2019 Active   ketorolac (TORADOL) 30 MG/ML Solution 5/22/2019 Active   levetiracetam (KEPPRA) 1000 MG tablet 5/27/2019 Active   risperiDONE (RISPERDAL) 1 MG Tab 5/27/2019 Active                ALLERGIES  Allergies   Allergen Reactions   • Sumatriptan Unspecified     Historical=\"Heart stops.\" Reaction  between 1995 to 1997   • Prochlorperazine Swelling     Tongue swelling. Reaction as a teen. (compazine)  Tolerated Phenergan on 02/24/15   • Vancomycin Shortness of Breath and Rash     Reaction in 2005.  D/W patient 8/31/15 - tolerated loading dose of vancomycin administered on 8/30/15 with some itching to chest with decreased infusion rate. Red Man Syndrome       PHYSICAL EXAM  VITAL SIGNS: /93  " " Pulse (!) 110   Temp 36.4 °C (97.6 °F) (Temporal)   Resp 18   Ht 1.6 m (5' 3\")   Wt 76.2 kg (167 lb 15.9 oz)   SpO2 95%   BMI 29.76 kg/m²     Constitutional: Well developed, Well nourished, Mild distress, Non-toxic appearance.   HENT: Normocephalic, Atraumatic, Bilateral external ears normal, Oropharynx moist, No oral exudates.   Eyes: PERRLA, EOMI, Conjunctiva normal, No discharge.   Neck: No tenderness, Supple, No stridor.   Lymphatic: No lymphadenopathy noted.   Cardiovascular: Tachycardic, Normal rhythm.   Thorax & Lungs: Clear to auscultation bilaterally, No respiratory distress, No wheezing, No crackles.   Abdomen: Soft, No tenderness, No masses, No pulsatile masses.   Skin: 3 small surgical scars on back that appear to be healing without sins of infection. Small wound to anterior chest. Small wound to right thumb. Warm, Dry, No erythema, No rash.   Extremities:, No edema No cyanosis.   Musculoskeletal: No tenderness to palpation or major deformities noted.  Intact distal pulses  Neurologic: Awake, alert. Moves all extremities spontaneously.  Psychiatric: Affect normal, Judgment normal, Mood normal.     LABS  Results for orders placed or performed during the hospital encounter of 05/27/19   CBC WITH DIFFERENTIAL   Result Value Ref Range    WBC 8.4 4.8 - 10.8 K/uL    RBC 4.60 4.20 - 5.40 M/uL    Hemoglobin 14.3 12.0 - 16.0 g/dL    Hematocrit 42.1 37.0 - 47.0 %    MCV 91.5 81.4 - 97.8 fL    MCH 31.1 27.0 - 33.0 pg    MCHC 34.0 33.6 - 35.0 g/dL    RDW 39.6 35.9 - 50.0 fL    Platelet Count 392 164 - 446 K/uL    MPV 9.6 9.0 - 12.9 fL    Neutrophils-Polys 78.50 (H) 44.00 - 72.00 %    Lymphocytes 15.00 (L) 22.00 - 41.00 %    Monocytes 5.50 0.00 - 13.40 %    Eosinophils 0.00 0.00 - 6.90 %    Basophils 0.60 0.00 - 1.80 %    Immature Granulocytes 0.40 0.00 - 0.90 %    Nucleated RBC 0.00 /100 WBC    Neutrophils (Absolute) 6.56 2.00 - 7.15 K/uL    Lymphs (Absolute) 1.25 1.00 - 4.80 K/uL    Monos (Absolute) 0.46 " 0.00 - 0.85 K/uL    Eos (Absolute) 0.00 0.00 - 0.51 K/uL    Baso (Absolute) 0.05 0.00 - 0.12 K/uL    Immature Granulocytes (abs) 0.03 0.00 - 0.11 K/uL    NRBC (Absolute) 0.00 K/uL   COMP METABOLIC PANEL   Result Value Ref Range    Sodium 131 (L) 135 - 145 mmol/L    Potassium 3.7 3.6 - 5.5 mmol/L    Chloride 100 96 - 112 mmol/L    Co2 21 20 - 33 mmol/L    Anion Gap 10.0 0.0 - 11.9    Glucose 97 65 - 99 mg/dL    Bun 8 8 - 22 mg/dL    Creatinine 0.69 0.50 - 1.40 mg/dL    Calcium 9.3 8.5 - 10.5 mg/dL    AST(SGOT) 17 12 - 45 U/L    ALT(SGPT) 17 2 - 50 U/L    Alkaline Phosphatase 113 (H) 30 - 99 U/L    Total Bilirubin 0.5 0.1 - 1.5 mg/dL    Albumin 4.5 3.2 - 4.9 g/dL    Total Protein 7.3 6.0 - 8.2 g/dL    Globulin 2.8 1.9 - 3.5 g/dL    A-G Ratio 1.6 g/dL   ESTIMATED GFR   Result Value Ref Range    GFR If African American >60 >60 mL/min/1.73 m 2    GFR If Non African American >60 >60 mL/min/1.73 m 2   .       RADIOLOGY  No orders to display     The radiologist's interpretation of all radiological studies have been reviewed by me.    COURSE & MEDICAL DECISION MAKING  Pertinent Labs & Imaging studies reviewed. (See chart for details)    5:36 PM - Patient seen and examined at bedside. Patient will be treated with 4 mg morphine, 10 mg Reglan, 4 mg Decadron, and 1,000 mL normal saline. The differential diagnoses include but are not limited to: migraine headache, recurrent MRSA.    HYDRATION: Based on the patient's presentation of Tachycardia the patient was given IV fluids. IV Hydration was used because oral hydration was not adequate alone. Upon recheck following hydration, the patient was slightly improved.        Decision Making:  Patient has a history of MRSA and has some skin lesions may be recurring I will put back on Bactrim since she is been off just recently her surgical wounds where she had an implanted stimulator did not appear infected    FINAL IMPRESSION  Headache  MRSA     I, Karthik Aldana (Scribadolfo), am lico  for, and in the presence of, Eric Dillard M.D..    Electronically signed by: Karthik Aldana (Scribe), 5/27/2019    IEric M.D. personally performed the services described in this documentation, as scribed by Karthik Aldana in my presence, and it is both accurate and complete.    The note accurately reflects work and decisions made by me.  Eric Dillard  5/27/2019  7:34 PM

## 2019-05-28 NOTE — ED NOTES
PT is discharged. PT given paperwork and RX. IV removed and bleeding controled. All questions answered. PT walked to waiting room.

## 2019-05-28 NOTE — ED NOTES
Med rec updated and complete. Allergies reviewed.  Pt denies oral antibiotic use in last days at home. Pt attempted to take her morning doses but was unable to keep them down.

## 2019-05-28 NOTE — ED NOTES
START OF SHIFT- Report received from Marie. White board updated. Pt updated on POC. Pending: reassessment. PT in no distress at this time. Will continue to monitor.

## 2019-05-29 NOTE — ED PROVIDER NOTES
ED Provider Note    Scribed for Juventino Andino M.D. by Juventino Andino. 5/28/2019,  6:27 PM.    CHIEF COMPLAINT  Chief Complaint   Patient presents with   • Headache     x 8 days, hx of migraines   • Unilateral Weakness     right side weakness, N/T       HPI  Enedelia Pang is a 48 y.o. female who presents to the Emergency Department for a migraine headache, with right sided arm and leg weakness, with some nausea and vomiting.  She said that she has had a migraine headache for the past 8 days.  She has a history of chronic migraines and sees neurology.  She also has a history of hemiplegic migraine.  He was seen here yesterday for similar symptoms, reporting a slow onset of a constant unilateral headache, consistent with her usual migraines.  She did not have much relief from her home with Toradol and Benadryl injections.  In April, she had a spinal cord stimulator placed which was supposed to treat her chronic migraines.  The stim later was removed 2 weeks later after reportedly becoming infected.  She was put on Keflex and Bactrim as part of that episode, and finished her course of antibiotics 5 days ago.  She is awake, pleasant, and cooperative.  She is slightly tremulous.  She is squinting, and reports being light sensitive.  She reports a constant, severe, left-sided headache.    I think it is very unlikely that this patient is having a stroke.  She has a history of hemiplegic migraines, though also reports a history of TIAs and stroke.  However, her only deficit is some very subtle right upper and lower extremity weakness.  She said that she has had this happen before.  Regardless, she is not and never was a TPA candidate, because she has had these symptoms for hours, and is outside the TPA window.  There is no evidence of a large vessel occlusion on her bedside exam.  I spoke with Dr. Hamlin, on-call for neurology shortly after the patient arrived, and explained the patient's history.  We agreed to try  "to treat her quickly and aggressively and watch her closely, and reconsider imaging if she is not showing some improvement within the next 20 to 30 minutes.    REVIEW OF SYSTEMS  See HPI for further details. All other systems are negative.     PAST MEDICAL HISTORY   has a past medical history of Allergy, unspecified not elsewhere classified; Anemia (10/05/2017); Anxiety; autoimmune; Depression; H/O Clostridium difficile infection; MRSA infection; Hydrocephalus; Migraine with aura, with intractable migraine, so stated, without mention of status migrainosus; MRSA (methicillin resistant Staphylococcus aureus); Pituitary abnormality (HCC); Staph infection; and Stroke (HCC).    SOCIAL HISTORY  Social History     Social History Main Topics   • Smoking status: Never Smoker   • Smokeless tobacco: Never Used   • Alcohol use Yes      Comment: Rare   • Drug use: No   • Sexual activity: Not on file     History   Drug Use No       SURGICAL HISTORY   has a past surgical history that includes cholecystectomy; tonsillectomy; appendectomy; tubal ligation; other; other neurological surg; irrigation & debridement neuro (N/A, 6/28/2015); lumbar exploration (N/A, 8/31/2015); spinal cord stimulator (12/19/2016); and  shunt insertion (5/31/2017).    CURRENT MEDICATIONS  Home Medications    **Home medications have not yet been reviewed for this encounter**         ALLERGIES  Allergies   Allergen Reactions   • Sumatriptan Unspecified     Historical=\"Heart stops.\" Reaction  between 1995 to 1997   • Prochlorperazine Swelling     Tongue swelling. Reaction as a teen. (compazine)  Tolerated Phenergan on 02/24/15   • Vancomycin Shortness of Breath and Rash     Reaction in 2005.  D/W patient 8/31/15 - tolerated loading dose of vancomycin administered on 8/30/15 with some itching to chest with decreased infusion rate. Red Man Syndrome       PHYSICAL EXAM  VITAL SIGNS: /87   Pulse 83   Temp 36.1 °C (96.9 °F) (Temporal)   Resp 18   Ht 1.6 " "m (5' 3\")   Wt 77.8 kg (171 lb 8.3 oz)   SpO2 96%   BMI 30.38 kg/m²   Pulse ox interpretation: I interpret this pulse ox as normal.  Constitutional: Alert in no apparent distress.  HENT: No signs of trauma, Bilateral external ears normal, Nose normal.   Eyes: Conjunctiva normal, Non-icteric.   Neck: Normal range of motion, Supple, No stridor.   Lymphatic: No lymphadenopathy noted.   Cardiovascular: Regular rate and rhythm, no murmurs.   Thorax & Lungs: Normal breath sounds, No respiratory distress, No wheezing, No chest tenderness.   Abdomen: Bowel sounds normal, Soft, No tenderness, No masses, No pulsatile masses. No peritoneal signs.  Skin: Warm, Dry, No erythema, No rash.   Extremities: Intact distal pulses, No edema, No cyanosis.  Musculoskeletal: Good range of motion in all major joints. No or major deformities noted.   Neurologic: Alert , Normal motor function, Normal sensory function, No focal deficits noted.   Psychiatric: Affect normal, Judgment normal, Mood normal.     DIAGNOSTIC STUDIES / PROCEDURES      LABS  Labs Reviewed   VALPROIC ACID - Abnormal; Notable for the following:        Result Value    Valproic Acid 39.8 (*)     All other components within normal limits   CBC WITH DIFFERENTIAL - Abnormal; Notable for the following:     RBC 4.05 (*)     All other components within normal limits    Narrative:     Indicate which anticoagulants the patient is on:->NONE   APTT - Abnormal; Notable for the following:     APTT 44.0 (*)     All other components within normal limits    Narrative:     Indicate which anticoagulants the patient is on:->NONE   BASIC METABOLIC PANEL - Abnormal; Notable for the following:     Sodium 127 (*)     Potassium 3.2 (*)     Co2 17 (*)     Anion Gap 14.0 (*)     All other components within normal limits   ACCU-CHEK GLUCOSE - Abnormal; Notable for the following:     Glucose - Accu-Ck 58 (*)     All other components within normal limits   PROTHROMBIN TIME    Narrative:     " "Indicate which anticoagulants the patient is on:->NONE   ESTIMATED GFR   ACCU-CHEK GLUCOSE     All labs reviewed by me.    RADIOLOGY  No orders to display     The radiologist's interpretation of all radiological studies have been reviewed by me.    COURSE & MEDICAL DECISION MAKING  Nursing notes, LAURA RAJPUTHx reviewed in chart.     6:27 PM Patient seen and examined at bedside. Differential diagnosis includes but is not limited to hemiplegic migraine, less likely hemorrhagic or ischemic stroke. Ordered for screening laboratory tests to evaluate. Patient will be treated with migraine cocktail, with the addition of magnesium sulfate, and Depakote for her symptoms.     6:40 PM patient reassessed at the bedside, and IV has been established and her medications are being initiated.  She is holding her emesis bag in her right hand without difficulty.  She is calm and conversational.    6:57 PM patient reassessed.  Her last medications were infusing.  She reports that sometimes, an IV dose of Depakote can break the headache.  I will order the Depakote, per her request.  We are checking her level as well.    7:07 PM patient reports that the tingling in her right hand is resolved, though it is still present in the right foot.  She is no longer tremulous.    7:39 PM this patient's Depakote level is below the therapeutic range.  She was given 500 mg empirically earlier, per her request, for migraine, and I am adding another 500 mg IV load.  The patient reports that she has been on a relatively low dose of Depakote recently, because in the past, she had had some elevation of her liver enzymes, presumed secondary to Depakote, \"but it has become a quality of life issue, because it is the only thing that seems to work.\"  Thus, she has been restarted on Depakote recently.    This patient has appear to be resting comfortably, though has kept her eyes covered.  She is had near complete resolution of her paresthesias and weakness in the " right arm and leg, though has continued to complain of 8 of 10 headache.  This is shows no signs of distress, she has requested low-dose ketamine infusion for pain, which she said was helpful enough to get her out of the hospital the last time that she was admitted.     This patient did well with the ketamine infusion, and is happy to be discharged home in the care of her .  She will follow-up with her pain management doctor.     The patient will return for new or worsening symptoms and is stable at the time of discharge.    The patient is referred to a primary physician for blood pressure management, diabetic screening, and for all other preventative health concerns.    DISPOSITION:  Patient will be discharged home in stable condition.    FOLLOW UP:  Elliott Power M.D.  [address] [place] [address]  [phone]            OUTPATIENT MEDICATIONS:  Discharge Medication List as of 5/28/2019 10:06 PM            FINAL IMPRESSION  1. Intractable hemiplegic migraine without status migrainosus

## 2019-05-29 NOTE — ED NOTES
MD does NOT want CT scan. He wants pt to received Medications. Ok to access port.   Md aware of low sugar, wanted pt to take PO. Dysphagia screen completed. Pt passed.

## 2019-05-29 NOTE — ED NOTES
Pt verbalized understanding of discharge and follow up instructions. PIV removed.  VSS.  All questions answered, ambulates with steady gait to discharge.

## 2019-05-29 NOTE — ED TRIAGE NOTES
".  Chief Complaint   Patient presents with   • Headache     x 8 days, hx of migraines   • Unilateral Weakness     right side weakness, N/T     ./75   Pulse 95   Temp 36.1 °C (96.9 °F) (Temporal)   Resp 18   Ht 1.6 m (5' 3\")   Wt 77.8 kg (171 lb 8.3 oz)   SpO2 100%   BMI 30.38 kg/m²     Patient ambulatory to triage with above complaints, hx of migraines, stroke, TIA, to charge desk for stroke protocol.    "

## 2019-05-29 NOTE — CONSULTS
49 yo woman with hemiplegic migraine hx with >1wk of headache/light sensitive, s/p spinal surgery ie spinal cord stimulator placed on 4/22 to treat her chronic migraines but it was removed 2 weeks later after it became infected on Keflex and Bactrim finished her course 4 days ago but has open sores developing concerning for infection/septic emboli with known MRSA. ED MD has strong suspicion for hemiplegic migraine.     Thu BROWN notes mild right sided weakness on exam otherwise no sig focal findings.     HEMIPLEGIC MIGRAINE VS MENINGOENCEPHALITIS VS SEPTIC EMBOLI  NOT IVT CANDIDATE DUE TO RECENT SPINAL SURGERY/STIMULATOR PLACEMENT COMPLICATED BY INFECTION IN PAST 3MONTHS & SEPTIC EMBOLIC CONCERN REAL WITH MRSA AND SKIN LESIONS    STROKE ALERT NOT PURSUED AS ABOVE REASONABLE  ATTEMPT TO TREAT MIGRAINE STAT, IF NO FULL EFFECT COMPLETE STROKE IR ALERT CTA/P  LP & PROPH ABX ADVISABLE    Referring Provider-  Juventino Andino M.D.      Viktor Hamlin M.D.  Clinical Professor, [employer]  Diplomate, Neurology & Neuroimaging

## 2019-06-02 ENCOUNTER — HOSPITAL ENCOUNTER (EMERGENCY)
Facility: MEDICAL CENTER | Age: 49
End: 2019-06-02
Attending: EMERGENCY MEDICINE
Payer: MEDICARE

## 2019-06-02 VITALS
HEIGHT: 63 IN | DIASTOLIC BLOOD PRESSURE: 96 MMHG | BODY MASS INDEX: 30.08 KG/M2 | WEIGHT: 169.75 LBS | RESPIRATION RATE: 16 BRPM | SYSTOLIC BLOOD PRESSURE: 136 MMHG | HEART RATE: 85 BPM | OXYGEN SATURATION: 96 % | TEMPERATURE: 97.4 F

## 2019-06-02 DIAGNOSIS — G89.29 CHRONIC NONINTRACTABLE HEADACHE, UNSPECIFIED HEADACHE TYPE: ICD-10-CM

## 2019-06-02 DIAGNOSIS — R51.9 CHRONIC NONINTRACTABLE HEADACHE, UNSPECIFIED HEADACHE TYPE: ICD-10-CM

## 2019-06-02 PROCEDURE — 96374 THER/PROPH/DIAG INJ IV PUSH: CPT

## 2019-06-02 PROCEDURE — 700111 HCHG RX REV CODE 636 W/ 250 OVERRIDE (IP): Performed by: EMERGENCY MEDICINE

## 2019-06-02 PROCEDURE — 96375 TX/PRO/DX INJ NEW DRUG ADDON: CPT

## 2019-06-02 PROCEDURE — 99284 EMERGENCY DEPT VISIT MOD MDM: CPT

## 2019-06-02 PROCEDURE — 700101 HCHG RX REV CODE 250: Performed by: EMERGENCY MEDICINE

## 2019-06-02 PROCEDURE — 700105 HCHG RX REV CODE 258: Performed by: EMERGENCY MEDICINE

## 2019-06-02 RX ORDER — DIPHENHYDRAMINE HYDROCHLORIDE 50 MG/ML
50 INJECTION INTRAMUSCULAR; INTRAVENOUS ONCE
Status: COMPLETED | OUTPATIENT
Start: 2019-06-02 | End: 2019-06-02

## 2019-06-02 RX ORDER — SODIUM CHLORIDE 9 MG/ML
1000 INJECTION, SOLUTION INTRAVENOUS ONCE
Status: COMPLETED | OUTPATIENT
Start: 2019-06-02 | End: 2019-06-02

## 2019-06-02 RX ORDER — METOCLOPRAMIDE HYDROCHLORIDE 5 MG/ML
10 INJECTION INTRAMUSCULAR; INTRAVENOUS ONCE
Status: COMPLETED | OUTPATIENT
Start: 2019-06-02 | End: 2019-06-02

## 2019-06-02 RX ADMIN — METOCLOPRAMIDE 10 MG: 5 INJECTION, SOLUTION INTRAMUSCULAR; INTRAVENOUS at 21:06

## 2019-06-02 RX ADMIN — HEPARIN 500 UNITS: 100 SYRINGE at 22:22

## 2019-06-02 RX ADMIN — DIPHENHYDRAMINE HYDROCHLORIDE 50 MG: 50 INJECTION INTRAMUSCULAR; INTRAVENOUS at 21:06

## 2019-06-02 RX ADMIN — SODIUM CHLORIDE 1000 ML: 9 INJECTION, SOLUTION INTRAVENOUS at 20:59

## 2019-06-02 RX ADMIN — KETAMINE HYDROCHLORIDE 25 MG: 10 INJECTION, SOLUTION INTRAMUSCULAR; INTRAVENOUS at 21:10

## 2019-06-02 ASSESSMENT — LIFESTYLE VARIABLES: DO YOU DRINK ALCOHOL: NO

## 2019-06-03 NOTE — ED NOTES
Enedelia Pang discharged via ambulatory with steady gait.  Discharge instructions given and reviewed, patient educated to follow up with PCP, verbalized understanding.  Prescriptions given x 0.  All personal belongings in possession.  No questions at this time.

## 2019-06-03 NOTE — ED TRIAGE NOTES
Pt amb to triage.  Chief Complaint   Patient presents with   • Migraine     x2wks, had some relief after er visit last week, now migraine persists     Pt arrives wearing ice pack hat on her head.

## 2019-06-03 NOTE — ED PROVIDER NOTES
"ED Provider Note    CHIEF COMPLAINT  Chief Complaint   Patient presents with   • Migraine     x2wks, had some relief after er visit last week, now migraine persists       HPI  Enedelia Pang is a 48 y.o. female who presents for evaluation of a headache.  Patient notes she thinks she has had a headache for approximately 2 weeks however she was seen recently in the emergency department and received several forms of treatment which resulted in resolution of the headache temporarily.  Her headache is only been present for 2 to 3 days if you do not count the time before this.  Patient notes it is on the left-hand side and she notes \"weakness\" on the right extremities.  She states this is normal for her left-sided headaches.  She also has right-sided headaches which are similar in character and severity and include throbbing in nature, nausea, photosensitivity, and sound sensitivity.  She has had a long history of migraine headaches and recently had to have her nerve stimulator, which incidentally worked wonderfully to alleviate her headaches, removed due to an infection.  She notes no fevers, neck pain or stiffness, or other constitutional symptoms.    REVIEW OF SYSTEMS  Constitutional: No fevers, weakness, or weight loss   Skin: No rashes, abrasions, lacerations, or pruritus  HEENT: No ear pain, ringing in ears, or decreased hearing. No sore throat, runny nose, sores, trouble swallowing, trouble speaking.  Neck: No neck pain, stiffness, or masses.  Chest: No pain or rashes  Pulm: No shortness of breath, cough, wheezing, stridor, or pain with inspiration/expiration  Gastrointestinal: No nausea, vomiting, diarrhea, constipation, bloating, melena, hematochezia or pain.  Genitourinary: No dysuria or hematuria  Musculoskeletal: No recent trauma, pain, swelling, weakness  Neurologic: No sensory or significant motor changes. No confusion or disorientation.  Heme: No bleeding or bruising problems.   Immuno: No hx of recurrent " "infections      PAST MEDICAL HISTORY   has a past medical history of Allergy, unspecified not elsewhere classified; Anemia (10/05/2017); Anxiety; autoimmune; Depression; H/O Clostridium difficile infection; MRSA infection; Hydrocephalus; Migraine with aura, with intractable migraine, so stated, without mention of status migrainosus; MRSA (methicillin resistant Staphylococcus aureus); Pituitary abnormality (HCC); Staph infection; and Stroke (HCC).    SOCIAL HISTORY  Social History     Social History Main Topics   • Smoking status: Never Smoker   • Smokeless tobacco: Never Used   • Alcohol use Yes      Comment: Rare   • Drug use: No   • Sexual activity: Not on file       SURGICAL HISTORY   has a past surgical history that includes cholecystectomy; tonsillectomy; appendectomy; tubal ligation; other; other neurological surg; irrigation & debridement neuro (N/A, 6/28/2015); lumbar exploration (N/A, 8/31/2015); spinal cord stimulator (12/19/2016); and  shunt insertion (5/31/2017).    CURRENT MEDICATIONS  Home Medications     Reviewed by Mauro Ulloa R.N. (Registered Nurse) on 06/02/19 at 2120  Med List Status: Complete   Medication Last Dose Status   aspirin (ASA) 81 MG Chew Tab chewable tablet  Active   Calcium Citrate-Vitamin D (CALCIUM + D PO)  Active   diphenhydrAMINE (BENADRYL) 50 MG/ML Solution  Active   divalproex ER (DEPAKOTE ER) 500 MG TABLET SR 24 HR  Active   duloxetine (CYMBALTA) 60 MG CPEP delayed-release capsule  Active   gabapentin (NEURONTIN) 600 MG tablet  Active   ketorolac (TORADOL) 30 MG/ML Solution  Active   levetiracetam (KEPPRA) 1000 MG tablet  Active   oxyCODONE immediate release (ROXICODONE) 10 MG immediate release tablet  Active   risperiDONE (RISPERDAL) 1 MG Tab  Active   sulfamethoxazole-trimethoprim (BACTRIM DS) 800-160 MG tablet  Active                ALLERGIES  Allergies   Allergen Reactions   • Sumatriptan Unspecified     Historical=\"Heart stops.\" Reaction  between 1995 to 1997   • " "Prochlorperazine Swelling     Tongue swelling. Reaction as a teen. (compazine)  Tolerated Phenergan on 02/24/15   • Vancomycin Shortness of Breath and Rash     Reaction in 2005.  D/W patient 8/31/15 - tolerated loading dose of vancomycin administered on 8/30/15 with some itching to chest with decreased infusion rate. Red Man Syndrome       PHYSICAL EXAM  VITAL SIGNS: /96   Pulse 85   Temp 36.3 °C (97.4 °F) (Temporal)   Resp 16   Ht 1.6 m (5' 3\")   Wt 77 kg (169 lb 12.1 oz)   SpO2 96%   BMI 30.07 kg/m²    Gen: Alert, wearing a cold compress around her head.  Attentive, appears tired, otherwise no distress  HEENT: No signs of trauma, Bilateral external ears normal, Nose normal. Conjunctiva normal, Non-icteric.  PERRLA, EOMI. bilateral TMs pearly with good light reflex.  No erythema noted to TM or external canal.  Neck:  No tenderness, Supple, No masses  Lymphatic: No cervical lymphadenopathy noted.   Cardiovascular: Regular rate and rhythm, no murmurs.   Thorax & Lungs: Normal breath sounds, No respiratory distress, No wheezing bilateral chest rise  Abdomen: No distention  Skin: Warm, Dry, No erythema, No rash.   Extremities: Intact distal pulses, No edema  Neurologic: Alert , no facial droop, grossly normal coordination and strength to all extremities.  Psychiatric: Affect normal, Judgment normal, Mood normal.       INITIAL IMPRESSION  Patient returns for what appears to be a recurrent migraine headache very similar in character and severity to her typical migraine headaches.  She has no findings or symptoms to suggest meningitis or encephalitis and I very much doubt subarachnoid hemorrhage.  I do not feel neuroimaging is necessary despite her right-sided weakness that she notes this is chronic for her.  I did review the patient's medical record and noted the extensive history of migraines as well as the neurologic symptoms typically associated with them.  I did consider CVA or TIA however feel these are " very unlikely given the patient's reassuring physical exam and the chronicity of the problem.  I will treat her empirically with fluids via IV as she is nauseous and cannot take oral fluids.  She notes that Reglan and Benadryl tend to help but that the ketamine did an excellent job last time.  I will order all of these and attempt to get her home if we can provide decent resolution of her symptoms.    Reevaluation   Time:10:06 PM  Vital signs:   Assessment: Patient is improving.  Smiles, attentive, does not appear tired.  States her pain is well controlled at 2 out of 10 at this time and wants to go home and sleep.  No new symptoms.   HYDRATION: Based on the patient's presentation of Inability to take oral fluids the patient was given IV fluids. IV Hydration was used because oral hydration was not adequate alone. Upon recheck following hydration, the patient was improved.    COURSE & MEDICAL DECISION MAKING  Pertinent Labs & Imaging studies reviewed. (See chart for details)  Patient arrives for evaluation of what appears to be rather persistent migraine headache or recurrent migraine headache at the very least.  Excellent resolution of her symptoms after fluids, Reglan, Benadryl, and ketamine.  I did not feel neuroimaging was necessary given the exam findings and symptomology.  I very much doubt CVA or TIA and I felt she was safe for discharge home on her current therapy.  She stated clear understanding that if she experiences new, worsening, or changing symptoms, she should return immediately for reevaluation.    FINAL IMPRESSION  1. Chronic nonintractable headache, unspecified headache type        Electronically signed by: Curt Iglesias, 6/2/2019 7:55 PM

## 2019-06-17 ENCOUNTER — HOSPITAL ENCOUNTER (EMERGENCY)
Facility: MEDICAL CENTER | Age: 49
End: 2019-06-17
Attending: EMERGENCY MEDICINE
Payer: MEDICARE

## 2019-06-17 VITALS
HEART RATE: 79 BPM | TEMPERATURE: 97.3 F | SYSTOLIC BLOOD PRESSURE: 126 MMHG | HEIGHT: 63 IN | RESPIRATION RATE: 18 BRPM | OXYGEN SATURATION: 96 % | DIASTOLIC BLOOD PRESSURE: 90 MMHG | WEIGHT: 167.55 LBS | BODY MASS INDEX: 29.69 KG/M2

## 2019-06-17 DIAGNOSIS — Z86.69 HISTORY OF MIGRAINE: ICD-10-CM

## 2019-06-17 DIAGNOSIS — G89.29 CHRONIC NONINTRACTABLE HEADACHE, UNSPECIFIED HEADACHE TYPE: ICD-10-CM

## 2019-06-17 DIAGNOSIS — R51.9 CHRONIC NONINTRACTABLE HEADACHE, UNSPECIFIED HEADACHE TYPE: ICD-10-CM

## 2019-06-17 PROCEDURE — 96374 THER/PROPH/DIAG INJ IV PUSH: CPT

## 2019-06-17 PROCEDURE — 96375 TX/PRO/DX INJ NEW DRUG ADDON: CPT

## 2019-06-17 PROCEDURE — 700105 HCHG RX REV CODE 258: Performed by: EMERGENCY MEDICINE

## 2019-06-17 PROCEDURE — 700111 HCHG RX REV CODE 636 W/ 250 OVERRIDE (IP): Performed by: EMERGENCY MEDICINE

## 2019-06-17 PROCEDURE — 700101 HCHG RX REV CODE 250: Performed by: EMERGENCY MEDICINE

## 2019-06-17 PROCEDURE — 99284 EMERGENCY DEPT VISIT MOD MDM: CPT

## 2019-06-17 RX ORDER — DIPHENHYDRAMINE HYDROCHLORIDE 50 MG/ML
25 INJECTION INTRAMUSCULAR; INTRAVENOUS ONCE
Status: COMPLETED | OUTPATIENT
Start: 2019-06-17 | End: 2019-06-17

## 2019-06-17 RX ORDER — CEPHALEXIN 500 MG/1
500 CAPSULE ORAL
COMMUNITY
End: 2019-08-31

## 2019-06-17 RX ORDER — DEXAMETHASONE SODIUM PHOSPHATE 10 MG/ML
10 INJECTION, SOLUTION INTRAMUSCULAR; INTRAVENOUS ONCE
Status: COMPLETED | OUTPATIENT
Start: 2019-06-17 | End: 2019-06-17

## 2019-06-17 RX ORDER — SODIUM CHLORIDE 9 MG/ML
1000 INJECTION, SOLUTION INTRAVENOUS ONCE
Status: COMPLETED | OUTPATIENT
Start: 2019-06-17 | End: 2019-06-17

## 2019-06-17 RX ORDER — METOCLOPRAMIDE HYDROCHLORIDE 5 MG/ML
10 INJECTION INTRAMUSCULAR; INTRAVENOUS ONCE
Status: COMPLETED | OUTPATIENT
Start: 2019-06-17 | End: 2019-06-17

## 2019-06-17 RX ADMIN — KETAMINE HYDROCHLORIDE 25 MG: 10 INJECTION, SOLUTION INTRAMUSCULAR; INTRAVENOUS at 10:47

## 2019-06-17 RX ADMIN — DEXAMETHASONE SODIUM PHOSPHATE 10 MG: 10 INJECTION INTRAMUSCULAR; INTRAVENOUS at 08:46

## 2019-06-17 RX ADMIN — DIPHENHYDRAMINE HYDROCHLORIDE 25 MG: 50 INJECTION INTRAMUSCULAR; INTRAVENOUS at 08:46

## 2019-06-17 RX ADMIN — HEPARIN 500 UNITS: 100 SYRINGE at 12:14

## 2019-06-17 RX ADMIN — METOCLOPRAMIDE 10 MG: 5 INJECTION, SOLUTION INTRAMUSCULAR; INTRAVENOUS at 08:47

## 2019-06-17 RX ADMIN — SODIUM CHLORIDE 1000 ML: 9 INJECTION, SOLUTION INTRAVENOUS at 08:47

## 2019-06-17 NOTE — ED NOTES
Port flushed with heparin and de accessed.  VSS.  Discharge instructions given- verbalizes understanding.   Ambulatory to lobby with steady gait.

## 2019-06-17 NOTE — DISCHARGE INSTRUCTIONS
Follow-up with primary care and pain management for reevaluation, medication management.    Continue home medications as previously indicated.    Return to the emergency department for intractable headache, altered mental status, focal weakness, slurred speech, vomiting, fever or other new concerns per

## 2019-06-17 NOTE — ED TRIAGE NOTES
"Enedelia Pang  48 y.o.  Chief Complaint   Patient presents with   • Migraine     onset friday, feels \"buzzing\" on left side of face which is new, extensive migraine hx, seen 4 times in last month.  neurostimulator was removed d/t MRSA     Patient ambulatory with steady gait to triage room with above complaint.  Patient appears in mild discomfort.  Is seeing neuro and pain mgmt for this issue.  Has been treated successfully with ketamine last few times.  Nothing else works.  Has port in place for IV access.  Has been having trouble getting follow up appointments.  Is following with infectious disease for MRSA.    Patient escorted to the lobby and instructed to notify staff of any changes in condition.      "

## 2019-06-17 NOTE — ED PROVIDER NOTES
"ED Provider Note    CHIEF COMPLAINT  Chief Complaint   Patient presents with   • Migraine     onset friday, feels \"buzzing\" on left side of face which is new, extensive migraine hx, seen 4 times in last month.  neurostimulator was removed d/t MRSA       HPI  Enedelia Pang is a 48 y.o. female who presents to the emergency department through triage for headache.  Patient describes history of chronic daily migraines, often worse at times.  History of previous spinal cord stimulator which she had had some relief however due to MRSA infection this was removed and may patient has had increasing difficulty controlling her headaches at home since that time.  Previous ED evaluations for the same.  Patient states she uses Benadryl and Toradol intramuscularly at home for breakthrough symptoms but this is been ineffective over the last couple of days.  This headache, constant, global, throbbing, 10 out of 10 since Friday.  No visual changes, slurred speech or focal weakness.  No difficulty ambulating.  Nausea without vomiting.  Decreased appetite.  No syncope.    Patient is followed closely by primary care as well as pain management, Dr. Mahoney.    Patient states previous interventions include migraine cocktail or even ketamine infusion.  Both of been effective previously.    REVIEW OF SYSTEMS  See HPI for further details. All other systems are negative.     PAST MEDICAL HISTORY   has a past medical history of Allergy, unspecified not elsewhere classified; Anemia (10/05/2017); Anxiety; autoimmune; Depression; H/O Clostridium difficile infection; MRSA infection; Hydrocephalus; Migraine with aura, with intractable migraine, so stated, without mention of status migrainosus; MRSA (methicillin resistant Staphylococcus aureus); Pituitary abnormality (HCC); Staph infection; and Stroke (HCC).    SOCIAL HISTORY  Social History     Social History Main Topics   • Smoking status: Never Smoker   • Smokeless tobacco: Never Used   • Alcohol " "use Yes      Comment: Rare   • Drug use: No   • Sexual activity: Not on file       SURGICAL HISTORY   has a past surgical history that includes cholecystectomy; tonsillectomy; appendectomy; tubal ligation; other; other neurological surg; irrigation & debridement neuro (N/A, 6/28/2015); lumbar exploration (N/A, 8/31/2015); spinal cord stimulator (12/19/2016); and  shunt insertion (5/31/2017).    CURRENT MEDICATIONS  Home Medications     Reviewed by Mauro Ulloa R.N. (Registered Nurse) on 06/17/19 at 0657  Med List Status: Complete   Medication Last Dose Status   aspirin (ASA) 81 MG Chew Tab chewable tablet  Active   Calcium Citrate-Vitamin D (CALCIUM + D PO)  Active   cephALEXin (KEFLEX) 500 MG Cap  Active   diphenhydrAMINE (BENADRYL) 50 MG/ML Solution  Active   divalproex ER (DEPAKOTE ER) 500 MG TABLET SR 24 HR  Active   duloxetine (CYMBALTA) 60 MG CPEP delayed-release capsule  Active   gabapentin (NEURONTIN) 600 MG tablet  Active   ketorolac (TORADOL) 30 MG/ML Solution  Active   levetiracetam (KEPPRA) 1000 MG tablet  Active   oxyCODONE immediate release (ROXICODONE) 10 MG immediate release tablet  Active   risperiDONE (RISPERDAL) 1 MG Tab  Active                ALLERGIES  Allergies   Allergen Reactions   • Sumatriptan Unspecified     Historical=\"Heart stops.\" Reaction  between 1995 to 1997   • Prochlorperazine Swelling     Tongue swelling. Reaction as a teen. (compazine)  Tolerated Phenergan on 02/24/15   • Vancomycin Shortness of Breath and Rash     Reaction in 2005.  D/W patient 8/31/15 - tolerated loading dose of vancomycin administered on 8/30/15 with some itching to chest with decreased infusion rate. Red Man Syndrome       PHYSICAL EXAM  VITAL SIGNS: /90   Pulse 78   Temp 36.3 °C (97.3 °F) (Temporal)   Resp 18   Ht 1.6 m (5' 3\")   Wt 76 kg (167 lb 8.8 oz)   SpO2 99%   BMI 29.68 kg/m²   Pulse ox interpretation: I interpret this pulse ox as normal.  Constitutional: Alert in no apparent " distress.  Disheveled.  HENT: Normocephalic, atraumatic. Bilateral external ears normal, Nose normal. Moist mucous membranes.    Eyes: Pupils are equal and reactive, Conjunctiva normal.  EOMI.  No nystagmus.  Neck: Normal range of motion, Supple, non-tender. No stridor.   Lymphatic: No lymphadenopathy noted.   Cardiovascular: Normal peripheral perfusion.  Thorax & Lungs: Nonlabored respirations.  Skin: Warm, Dry  Musculoskeletal: Good range of motion in all major joints.   Neurologic: Alert and oriented x4.  Speech clear cohesive.  No facial droop.  Cranial nerves II through XII intact bilaterally.  5 out of 5 strength in 4 extremities, both proximal distal muscle groups.  No pronator drift.  Ambulates independently without ataxia.  Psychiatric: Flat affect.  Cooperative.      COURSE & MEDICAL DECISION MAKING  ED evaluation was consistent with acute exacerbation of chronic headaches, reported migraines.  Patient with only some mild improvement after IV fluid bolus (for decreased appetite, nausea and clinical dehydration), Reglan, Benadryl and Decadron.  Patient then received ketamine infusion for pain, with more notable relief of her symptoms.  Neurologically intact and nonfocal.  Presentation is otherwise unchanged from chronic headaches.  No history of clinical evidence to suggest subarachnoid hemorrhage, stroke or other mass-effect.  No clinical evidence for meningitis.  Vital signs stable without fever tachycardia.    Patient is stable for discharge at this time, anticipatory guidance provided, continue home medications for headache and chronic pain, close follow-up is encouraged, and strict ED return instructions have been detailed. Patient is agreeable to the disposition and plan.    Patient's blood pressure was elevated in the emergency department, and has been referred to primary care for close monitoring.  FINAL IMPRESSION  (R51) Chronic nonintractable headache, unspecified headache type  (Z86.69) History  of migraine      Electronically signed by: Lluvia George, 6/17/2019 11:44 AM      This dictation was created using voice recognition software. The accuracy of the dictation is limited to the abilities of the software. I expect there may be some errors of grammar and possibly content. The nursing notes were reviewed and certain aspects of this information were incorporated into this note.

## 2019-07-02 ENCOUNTER — HOSPITAL ENCOUNTER (EMERGENCY)
Facility: MEDICAL CENTER | Age: 49
End: 2019-07-03
Attending: EMERGENCY MEDICINE
Payer: MEDICARE

## 2019-07-02 VITALS
WEIGHT: 168.65 LBS | SYSTOLIC BLOOD PRESSURE: 99 MMHG | OXYGEN SATURATION: 92 % | HEART RATE: 92 BPM | DIASTOLIC BLOOD PRESSURE: 63 MMHG | BODY MASS INDEX: 29.88 KG/M2 | TEMPERATURE: 96.9 F | HEIGHT: 63 IN | RESPIRATION RATE: 15 BRPM

## 2019-07-02 DIAGNOSIS — R51.9 ACUTE NONINTRACTABLE HEADACHE, UNSPECIFIED HEADACHE TYPE: ICD-10-CM

## 2019-07-02 DIAGNOSIS — G43.809 OTHER MIGRAINE WITHOUT STATUS MIGRAINOSUS, NOT INTRACTABLE: ICD-10-CM

## 2019-07-02 LAB — HCG SERPL QL: NEGATIVE

## 2019-07-02 PROCEDURE — 700105 HCHG RX REV CODE 258: Performed by: EMERGENCY MEDICINE

## 2019-07-02 PROCEDURE — 700101 HCHG RX REV CODE 250: Performed by: EMERGENCY MEDICINE

## 2019-07-02 PROCEDURE — 99284 EMERGENCY DEPT VISIT MOD MDM: CPT

## 2019-07-02 PROCEDURE — 84703 CHORIONIC GONADOTROPIN ASSAY: CPT

## 2019-07-02 PROCEDURE — 96374 THER/PROPH/DIAG INJ IV PUSH: CPT

## 2019-07-02 PROCEDURE — 700111 HCHG RX REV CODE 636 W/ 250 OVERRIDE (IP): Performed by: EMERGENCY MEDICINE

## 2019-07-02 PROCEDURE — 96375 TX/PRO/DX INJ NEW DRUG ADDON: CPT

## 2019-07-02 RX ORDER — DIPHENHYDRAMINE HYDROCHLORIDE 50 MG/ML
25 INJECTION INTRAMUSCULAR; INTRAVENOUS ONCE
Status: COMPLETED | OUTPATIENT
Start: 2019-07-02 | End: 2019-07-02

## 2019-07-02 RX ORDER — METOCLOPRAMIDE HYDROCHLORIDE 5 MG/ML
10 INJECTION INTRAMUSCULAR; INTRAVENOUS ONCE
Status: COMPLETED | OUTPATIENT
Start: 2019-07-02 | End: 2019-07-02

## 2019-07-02 RX ORDER — SODIUM CHLORIDE 9 MG/ML
1000 INJECTION, SOLUTION INTRAVENOUS ONCE
Status: COMPLETED | OUTPATIENT
Start: 2019-07-02 | End: 2019-07-03

## 2019-07-02 RX ORDER — DEXAMETHASONE SODIUM PHOSPHATE 4 MG/ML
10 INJECTION, SOLUTION INTRA-ARTICULAR; INTRALESIONAL; INTRAMUSCULAR; INTRAVENOUS; SOFT TISSUE ONCE
Status: COMPLETED | OUTPATIENT
Start: 2019-07-02 | End: 2019-07-02

## 2019-07-02 RX ADMIN — METOCLOPRAMIDE 10 MG: 5 INJECTION, SOLUTION INTRAMUSCULAR; INTRAVENOUS at 21:45

## 2019-07-02 RX ADMIN — DEXAMETHASONE SODIUM PHOSPHATE 10 MG: 4 INJECTION, SOLUTION INTRA-ARTICULAR; INTRALESIONAL; INTRAMUSCULAR; INTRAVENOUS; SOFT TISSUE at 21:48

## 2019-07-02 RX ADMIN — KETAMINE HYDROCHLORIDE 25 MG: 10 INJECTION, SOLUTION INTRAMUSCULAR; INTRAVENOUS at 22:51

## 2019-07-02 RX ADMIN — DIPHENHYDRAMINE HYDROCHLORIDE 25 MG: 50 INJECTION INTRAMUSCULAR; INTRAVENOUS at 21:47

## 2019-07-02 RX ADMIN — SODIUM CHLORIDE 1000 ML: 9 INJECTION, SOLUTION INTRAVENOUS at 21:47

## 2019-07-03 PROCEDURE — 700111 HCHG RX REV CODE 636 W/ 250 OVERRIDE (IP): Performed by: EMERGENCY MEDICINE

## 2019-07-03 RX ADMIN — HEPARIN 500 UNITS: 100 SYRINGE at 00:11

## 2019-07-03 NOTE — ED PROVIDER NOTES
ED Provider Note    Scribed for Fredi Ascencio M.D. by Almas Huber. 7/2/2019  8:55 PM    Primary care provider: Elliott Power M.D.  Means of arrival: Walk In  History obtained from: patient  History limited by: None     CHIEF COMPLAINT  Chief Complaint   Patient presents with   • Migraine     for 3 days     HPI  Enedelia Pang is a 48 y.o. female who presents to the Emergency Department complaining of a migraine. Patient complains of a constant headache for the last 3 days. She states her headache is mostly right sided, but occasionally radiates to the left side. She endorses light sensitivity and generalized fatigue associated with her headache. Patient rates her current headache as 9/10 in severity. She has tried Ibuprofen, Toradol, magnesium for her headaches without improvement. Patient states she has been treated for headaches in the past with Ketamine with significant relief of her headaches.    Patient has been seen in the ED multiple times for similar headaches. She was last seen on 6/17/19 and treated with Reglan, Benadryl, Decadron, and Ketamine with significant improvement of symptoms. Patient previously had a stimulator in place to control migraines, but stimulator was removed due to infection and migraines have been increasing since then.     Patient denies any fever, chills, new numbness or focal weakness, neck pain, back pain, vomiting, diarrhea, chest pain, or shortness of breath.       REVIEW OF SYSTEMS  Pertinent positives include: headache, light sensitivity, fatigue. Pertinent negatives include: fever, chills, new numbness or focal weakness, neck pain, back pain, vomiting, diarrhea, chest pain, or shortness of breath. See history of present illness.    PAST MEDICAL HISTORY   has a past medical history of Allergy, unspecified not elsewhere classified; Anemia (10/05/2017); Anxiety; autoimmune; Depression; H/O Clostridium difficile infection; MRSA infection; Hydrocephalus; Migraine with  "aura, with intractable migraine, so stated, without mention of status migrainosus; MRSA (methicillin resistant Staphylococcus aureus); Pituitary abnormality (HCC); Staph infection; and Stroke (HCC).    SURGICAL HISTORY   has a past surgical history that includes cholecystectomy; tonsillectomy; appendectomy; tubal ligation; other; other neurological surg; irrigation & debridement neuro (N/A, 6/28/2015); lumbar exploration (N/A, 8/31/2015); spinal cord stimulator (12/19/2016); and  shunt insertion (5/31/2017).    SOCIAL HISTORY  Social History   Substance Use Topics   • Smoking status: Never Smoker   • Smokeless tobacco: Never Used   • Alcohol use Yes      Comment: Rare      History   Drug Use No     FAMILY HISTORY  Family History   Problem Relation Age of Onset   • No Known Problems Mother    • No Known Problems Father      CURRENT MEDICATIONS  Home Medications     Reviewed by Hanh Reece R.N. (Registered Nurse) on 07/02/19 at 2010  Med List Status: Not Addressed   Medication Last Dose Status   aspirin (ASA) 81 MG Chew Tab chewable tablet  Active   Calcium Citrate-Vitamin D (CALCIUM + D PO)  Active   cephALEXin (KEFLEX) 500 MG Cap  Active   diphenhydrAMINE (BENADRYL) 50 MG/ML Solution  Active   divalproex ER (DEPAKOTE ER) 500 MG TABLET SR 24 HR  Active   duloxetine (CYMBALTA) 60 MG CPEP delayed-release capsule  Active   gabapentin (NEURONTIN) 600 MG tablet  Active   ketorolac (TORADOL) 30 MG/ML Solution  Active   levetiracetam (KEPPRA) 1000 MG tablet  Active   oxyCODONE immediate release (ROXICODONE) 10 MG immediate release tablet  Active   risperiDONE (RISPERDAL) 1 MG Tab  Active              ALLERGIES  Allergies   Allergen Reactions   • Sumatriptan Unspecified     Historical=\"Heart stops.\" Reaction  between 1995 to 1997   • Prochlorperazine Swelling     Tongue swelling. Reaction as a teen. (compazine)  Tolerated Phenergan on 02/24/15   • Vancomycin Shortness of Breath and Rash     Reaction in 2005.  D/W " "patient 8/31/15 - tolerated loading dose of vancomycin administered on 8/30/15 with some itching to chest with decreased infusion rate. Red Man Syndrome     PHYSICAL EXAM  VITAL SIGNS: /83   Pulse (!) 101   Temp 36.1 °C (96.9 °F) (Temporal)   Resp 16   Ht 1.6 m (5' 3\")   Wt 76.5 kg (168 lb 10.4 oz)   SpO2 97%   BMI 29.88 kg/m²     Constitutional: Alert in no apparent distress.  HENT: No signs of trauma, Bilateral external ears normal, Nose normal. Uvula midline.   Eyes: Pupils are equal and reactive, Conjunctiva normal, Non-icteric.   Neck: Normal range of motion, No tenderness, Supple, No stridor.   Lymphatic: No lymphadenopathy noted.   Cardiovascular: Regular rate and rhythm, no murmurs.   Thorax & Lungs: Normal breath sounds, No respiratory distress, No wheezing, No chest tenderness.   Abdomen:  Soft, No tenderness, No peritoneal signs, No masses, No pulsatile masses.   Skin: Warm, Dry, No erythema, No rash.   Back: No bony tenderness, No CVA tenderness.   Extremities: Intact distal pulses, No edema, No tenderness, No cyanosis.  Musculoskeletal: Good range of motion in all major joints. No tenderness to palpation or major deformities noted.   Neurologic: Alert. Cranial nerves II through XII intact.  5 out of 5 strength x4.  Sensation intact light touch.  Normal finger-nose-finger.  Normal reflexes bilaterally. No clonus. EOMI. PERRL.    Psychiatric: Affect normal, Judgment normal, Mood normal.     DIAGNOSTIC STUDIES / PROCEDURES    LABS  Labs Reviewed   HCG QUAL SERUM      All labs reviewed by me.    COURSE & MEDICAL DECISION MAKING  Nursing notes, VS, PMSFHx reviewed in chart.    48 y.o. female p/w chief complaint of headache for the last 3 days.    8:55 PM Patient seen and examined at bedside. Patient treated with Decadron 10 mg IV, Reglan 10 mg IV, Benadryl 25 mg IV.  Pt w/ persistent mild HA.  Given this will tx w/ pain dose Ketamine as pt has received relief from this in past.        " Intravenous fluids administered for tachycardia, headache. Patient not appropriate for oral rehydration, as surgical process needs to be ruled out before trial of oral rehydration. On repeat evaluation, patient feeling better.  Pt w/ positive fluid response.   11:26 PM Recheck: Patient re-evaluated at [facility]. Patient's headache resolved with above medications. Patient feels comfortable for discharge at this time. She is given discharge instructions as well as return precautions.     The differential diagnoses include but are not limited to:  #Headache  Headache not maximal at onset and feels similar to prior headaches, doubt SAH.     Pt denies numbness or weakness. Doubt CVA or ICH.  Pt given migraine cocktail with Ketamine low dose IVPB in ED w/ resolution of sx prior to dc  Unclear etiology of trigger.    No household members w/ similar to suggest CO poisoning.   No fever or AMS to suggest encephilitis or meningitis.  Pt w/ unremarkable neuro exam.  Pt w/ resolution of sx prior to d/c.   Ambulates w/ steady gait w/o assistance.             DISPOSITION:  Patient will be discharged home in stable condition.    FOLLOW UP:  Elliott Power M.D.  [address] [facility] [address]  [phone]      Please call to schedule follow-up appointment within the next week to discuss change in medication to help alleviate migraines      FINAL IMPRESSION  1. Other migraine without status migrainosus, not intractable    2. Acute nonintractable headache, unspecified headache type          I, Almas Huber (Rob), am scribing for, and in the presence of, Fredi Ascencio M.D..    Electronically signed by: Almas Huber (Rob), 7/2/2019    IFredi M.D. personally performed the services described in this documentation, as scribed by Almas Huber in my presence, and it is both accurate and complete.    The note accurately reflects work and decisions made by me.  Fredi Ascencio  7/3/2019  3:26 AM

## 2019-07-03 NOTE — ED NOTES
Discharge instructions given to patient, a verbal understanding of all instructions was stated. Deaccessed Pt port site without s/s of infection. Pt preferred to walk out accompanied by  VSS, all belongings accounted for.

## 2019-07-03 NOTE — ED TRIAGE NOTES
"Chief Complaint   Patient presents with   • Migraine     for 3 days     Pt ambulatory to triage for above complaint. Pt states \"light and smells make the pain worse\" pt rates headache 9/10 pain. Pt is somnolent in triage. Pt seen here multiple times for same complaint. Pt had a stimulator removed due to infection and ever since has an had increase in her migraines.   Pt educated on triage process and placed back in lobby.  /83   Pulse (!) 101   Temp 36.1 °C (96.9 °F) (Temporal)   Resp 16   Ht 1.6 m (5' 3\")   Wt 76.5 kg (168 lb 10.4 oz)   SpO2 97%   BMI 29.88 kg/m²     "

## 2019-07-03 NOTE — ED NOTES
Accessed Pt port using sterile technique, Pt tolerated well. 10ml blood withdrawn and discarded, flushed with 10ml saline. Medicated per MAR.

## 2019-07-17 ENCOUNTER — TELEPHONE (OUTPATIENT)
Dept: NEUROLOGY | Facility: MEDICAL CENTER | Age: 49
End: 2019-07-17

## 2019-07-25 ENCOUNTER — TELEPHONE (OUTPATIENT)
Dept: INFECTIOUS DISEASES | Facility: MEDICAL CENTER | Age: 49
End: 2019-07-25
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
## 2019-08-07 ENCOUNTER — OFFICE VISIT (OUTPATIENT)
Dept: INFECTIOUS DISEASES | Facility: MEDICAL CENTER | Age: 49
End: 2019-08-07
Payer: MEDICARE

## 2019-08-07 VITALS
HEART RATE: 91 BPM | BODY MASS INDEX: 29.59 KG/M2 | SYSTOLIC BLOOD PRESSURE: 124 MMHG | DIASTOLIC BLOOD PRESSURE: 78 MMHG | OXYGEN SATURATION: 100 % | WEIGHT: 167 LBS | TEMPERATURE: 96.9 F | HEIGHT: 63 IN

## 2019-08-07 DIAGNOSIS — Z86.19 HISTORY OF SURGICAL SITE INFECTION: ICD-10-CM

## 2019-08-07 DIAGNOSIS — A04.72 INTESTINAL INFECTION DUE TO CLOSTRIDIUM DIFFICILE: ICD-10-CM

## 2019-08-07 PROCEDURE — 99214 OFFICE O/P EST MOD 30 MIN: CPT | Performed by: INTERNAL MEDICINE

## 2019-08-07 NOTE — PROGRESS NOTES
"Chief Complaint   Patient presents with   • Follow-Up     Neuro Stimulator Infection       HISTORY OF PRESENT ILLNESS:  47 y/o here for neurostim infection  Last seen Dec2017 for elevated ASO titer. Known to ID from prior port infection in 2015 Port since removed. +chronobacter and MSSA  Since January 2016 + joint pain and swelling of hands,knees, and weakness and septal perforation.  ENT initial evaluation noted no disease activity.  No h/o illicit drug use.  She also has a history of abnormal LFT with unclear etiology.  Work-up with GI in early 2017 which was unrevealing.   She has had a recurrent rash described as papules/bullae that ulcerate. Sometimes are associated with surrounding halo or erythema. S/p biopsy-results are pending  Hep C neg. HIV neg  h/o MRSA infections.     Her rheumatologic work-up showed RF(-), MG (-), ANCA (-), SCL 70 (-) and anti centromere (-)  Parvovirus and rubella IgM was negative   Pictures reviewed-skin lesions bullous then ulcerate. Sometimes associated with erythema.  Sometimes antecedent ecchymosis or halo  Has  shunt    11/2/2017  DW Rheum yesterday Dr Andrade  Prior hosp admission reviewed  ASO titer not done as patient requested to be done in the hospital and it was not done as requested  Problems with bilateral hand swelling-has punched out approx 1 cm lesions/ulcerations dorsum of both hands  No erythema or warmth  No fever or chills-swelling increased after stopping steroids    12/14/2017  BUE lesions nearly healed-biopsy deferred by Derm. ASO not done  Admitted to [facility] for foot lesion-occurred very suddenly  Apparently swab +group B strep-treated with clinda  No current cellulitis/diarrhea  Has wound care f/u-still painful  Has yeast infection now from abx    8/7/2019  Since last visit had neurostim placed in April 2019-3 weeks developed \"staph\" infection-took 3 days of Keflex without improvement so it was removed. Surgical sites have healed. No episodes of fever, " chills, drainage from the sites.  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   • Stroke ([facility])    • Cellulitis of finger - vs, vasculitis or calciphylaxis 06/14/2018   • Spell of dizziness 06/08/2018   • Substance dependence ([facility]) 05/30/2018   • Electrolyte abnormality 05/28/2018   • Tachycardia 05/28/2018   • Spells of decreased attentiveness 02/22/2018   • Vitamin deficiency 02/22/2018   • Vasculopathy 01/25/2018   • Opiate withdrawal ([facility]) 11/19/2017   • Seizures ([facility]) 11/17/2017   • history of pseudotumor cerebri 11/17/2017   • History of skin ulcer 10/15/2017   • Chronic migraine 07/13/2017   • Benign intracranial hypertension 06/14/2017   • Nausea and vomiting 05/26/2017   • Positive blood cultures 05/26/2017   • Whole body pain 05/22/2017   • Rheumatoid arthritis(714.0) 05/22/2017   • Nasal congestion 05/22/2017   • Diarrhea 05/21/2017   • Status migrainosus 05/21/2017   • LFT elevation 05/21/2017   • Hyperlipidemia 05/01/2017   • Metabolic acidosis 05/01/2017   • Rheumatoid arthritis ([facility]) 05/01/2017   • H/O: CVA (cerebrovascular accident) 04/30/2017   • Suicidal ideation 04/30/2017   • Chest pain 04/29/2017   • Seronegative rheumatoid arthritis ([facility]) 01/17/2017   • Swelling of joint of hand 01/06/2017   • Chronic pain disorder 12/19/2016   • Chronic pain syndrome 12/15/2016   • Chronic pain 10/04/2015   • Bacteremia 10/04/2015   • Infected  venous access port 10/03/2015   • Septic shock ([facility]) 08/31/2015   • Normocytic anemia 08/31/2015   • Thrombocytopenia ([facility]) 08/31/2015   • Hypokalemia 08/30/2015   • Lactic acidosis 08/30/2015   • Leukocytosis 08/30/2015   • Sepsis ([facility]) 08/30/2015   • Intractable low back pain 08/30/2015   • Hypocalcemia 08/30/2015   • Headache 07/03/2015   • Abdominal pain 07/03/2015   • S/P  shunt 06/29/2015   • Anxiety 06/29/2015   • Depression 06/29/2015   • Back pain 06/28/2015   • SIRS (systemic inflammatory response syndrome) ([facility]) 06/28/2015   • Wounds, multiple 04/23/2015   • Hydrocephalus 04/23/2015   • Dermatitis- chronic 03/24/2015   • Low blood pressure reading 03/22/2015   • Intractable migraine 03/21/2015   • Elevated ALT 11/04/2014   • Osteomyelitis of finger of left hand ([facility]) 10/30/2014   • R Hemiparesis 10/29/2014   • Headache and focal deficits, likely secondary to Complicated migraine 10/29/2014   • Intestinal infection due to Clostridium difficile 10/10/2014   • Paresthesia of right arm 10/09/2014       Allergies:Prochlorperazine; Sumatriptan; and Vancomycin    Current Outpatient Medications   Medication Sig Dispense Refill   • ketorolac (TORADOL) 30 MG/ML Solution 15 mg by Intravenous route every 6 hours as needed.     • divalproex ER (DEPAKOTE ER) 500 MG TABLET SR 24 HR Take 500 mg by mouth 2 Times a Day.     • levetiracetam (KEPPRA) 1000 MG tablet Take 1,500 mg by mouth 2 Times a Day.     • risperiDONE (RISPERDAL) 1 MG Tab Take 2 mg by mouth 2 times a day.     • diphenhydrAMINE (BENADRYL) 50 MG/ML Solution 50 mg by Intramuscular route every 6 hours as needed.     • Calcium Citrate-Vitamin D (CALCIUM + D PO) Take 1 Tab by mouth every morning.     • aspirin (ASA) 81 MG Chew Tab chewable tablet Take 81 mg by mouth every morning.     • gabapentin (NEURONTIN) 600 MG tablet Take 600 mg by mouth 3 times a day.     • duloxetine (CYMBALTA) 60 MG CPEP delayed-release capsule Take 60 mg by mouth 2 times a day.     •  "cephALEXin (KEFLEX) 500 MG Cap Take 500 mg by mouth 6 Times a Day.     • oxyCODONE immediate release (ROXICODONE) 10 MG immediate release tablet Take 10 mg by mouth every four hours as needed for Moderate Pain.       No current facility-administered medications for this visit.        Social History     Tobacco Use   • Smoking status: Never Smoker   • Smokeless tobacco: Never Used   Substance Use Topics   • Alcohol use: Yes     Comment: Rare   • Drug use: No       ROS:  Review of Systems   Constitutional: Negative for fever, chills, weight loss and malaise/fatigue.   HENT: Negative for ear pain, nosebleeds, congestion, sore throat and neck pain.      All other systems reviewed and are negative except as in HPI.      Exam:  /78   Pulse 91   Temp 36.1 °C (96.9 °F) (Temporal)   Ht 1.6 m (5' 2.99\")   Wt 75.8 kg (167 lb)   SpO2 100%   General:  Well nourished, well developed female in NAD. Pleasant and cooperative  Head: NCAT, Pupils dilated equal round reactive to light. Extraocular movements intact. Oropharynx is clear.  Neck: Supple.  No LAD  Pulmonary: Clear to ausculation.  No rales, rhonchi, or wheezing. Unlabored  Cardiovascular: Regular rate and rhythm without murmur. No ectopy  Abdominal: Soft, nontender, nondistended. Positive bowel sounds.   Skin: No rashes. Well right lower back and upper mid back scars-no erythema, fluctuance  Extremities: no clubbing, cyanosis  Neurologic: Alert and oriented x4. Speech is fluent without dysarthria. Cranial nerves intact. Moving all extremities. Gait walking without cane      Assessment/Plan:  1. Intestinal infection due to Clostridium difficile     2. History of surgical site infection     Records reviewed in Epic: treated with Bactrim and Keflex for 2 weeks  Neurostim removed  Routine pre-op abx recommended as below  Pre-op Hibiclens to skin and mupirocin to nares  Pre-op dose ancef or vancomycin (if prior MRSA or MRSE) within 1 hour of the procedure per " protocol  Additional PO abx not recommended as risk outweighs potential benefit, norma given her h/o Cdiff colitis  FU prn       Marissa Garner M.D.

## 2019-08-07 NOTE — ASSESSMENT & PLAN NOTE
Pre-op hibiclens and mupirocin to nares  Pre-op dose ancef or vancomycin 1 hour prior to procedure per protocol  Additional PO abx not recommended as risk outweighs potential benefit

## 2019-08-25 PROCEDURE — 99284 EMERGENCY DEPT VISIT MOD MDM: CPT

## 2019-08-26 ENCOUNTER — HOSPITAL ENCOUNTER (EMERGENCY)
Facility: MEDICAL CENTER | Age: 49
End: 2019-08-26
Attending: EMERGENCY MEDICINE
Payer: MEDICARE

## 2019-08-26 VITALS
HEART RATE: 70 BPM | DIASTOLIC BLOOD PRESSURE: 72 MMHG | TEMPERATURE: 98 F | BODY MASS INDEX: 30.08 KG/M2 | SYSTOLIC BLOOD PRESSURE: 125 MMHG | HEIGHT: 63 IN | RESPIRATION RATE: 16 BRPM | WEIGHT: 169.75 LBS | OXYGEN SATURATION: 98 %

## 2019-08-26 DIAGNOSIS — G43.909 MIGRAINE WITHOUT STATUS MIGRAINOSUS, NOT INTRACTABLE, UNSPECIFIED MIGRAINE TYPE: ICD-10-CM

## 2019-08-26 LAB — VALPROATE SERPL-MCNC: 54.8 UG/ML (ref 50–100)

## 2019-08-26 PROCEDURE — 700105 HCHG RX REV CODE 258: Performed by: EMERGENCY MEDICINE

## 2019-08-26 PROCEDURE — 96375 TX/PRO/DX INJ NEW DRUG ADDON: CPT

## 2019-08-26 PROCEDURE — 80164 ASSAY DIPROPYLACETIC ACD TOT: CPT

## 2019-08-26 PROCEDURE — 700111 HCHG RX REV CODE 636 W/ 250 OVERRIDE (IP): Performed by: EMERGENCY MEDICINE

## 2019-08-26 PROCEDURE — 700101 HCHG RX REV CODE 250: Performed by: EMERGENCY MEDICINE

## 2019-08-26 PROCEDURE — 96374 THER/PROPH/DIAG INJ IV PUSH: CPT

## 2019-08-26 RX ORDER — KETOROLAC TROMETHAMINE 30 MG/ML
30 INJECTION, SOLUTION INTRAMUSCULAR; INTRAVENOUS ONCE
Status: COMPLETED | OUTPATIENT
Start: 2019-08-26 | End: 2019-08-26

## 2019-08-26 RX ORDER — METOCLOPRAMIDE HYDROCHLORIDE 5 MG/ML
10 INJECTION INTRAMUSCULAR; INTRAVENOUS ONCE
Status: COMPLETED | OUTPATIENT
Start: 2019-08-26 | End: 2019-08-26

## 2019-08-26 RX ORDER — DIPHENHYDRAMINE HYDROCHLORIDE 50 MG/ML
50 INJECTION INTRAMUSCULAR; INTRAVENOUS ONCE
Status: COMPLETED | OUTPATIENT
Start: 2019-08-26 | End: 2019-08-26

## 2019-08-26 RX ORDER — SODIUM CHLORIDE 9 MG/ML
1000 INJECTION, SOLUTION INTRAVENOUS ONCE
Status: COMPLETED | OUTPATIENT
Start: 2019-08-26 | End: 2019-08-26

## 2019-08-26 RX ADMIN — HEPARIN 500 UNITS: 100 SYRINGE at 04:12

## 2019-08-26 RX ADMIN — METOCLOPRAMIDE 10 MG: 5 INJECTION, SOLUTION INTRAMUSCULAR; INTRAVENOUS at 01:36

## 2019-08-26 RX ADMIN — DIPHENHYDRAMINE HYDROCHLORIDE 50 MG: 50 INJECTION INTRAMUSCULAR; INTRAVENOUS at 01:36

## 2019-08-26 RX ADMIN — SODIUM CHLORIDE 1000 ML: 9 INJECTION, SOLUTION INTRAVENOUS at 01:36

## 2019-08-26 RX ADMIN — KETAMINE HYDROCHLORIDE 25 MG: 10 INJECTION, SOLUTION INTRAMUSCULAR; INTRAVENOUS at 03:10

## 2019-08-26 RX ADMIN — KETOROLAC TROMETHAMINE 30 MG: 30 INJECTION, SOLUTION INTRAMUSCULAR at 01:36

## 2019-08-26 NOTE — ED PROVIDER NOTES
ED Provider Note    Scribed for Cruz Dumont M.D. by Adilson Benjamin. 8/26/2019, 12:42 AM.    Primary care provider: Elliott Power M.D.  Means of arrival: Walk in   History obtained from: Patient  History limited by: None    CHIEF COMPLAINT  Chief Complaint   Patient presents with   • Headache     x 3 days       HPI  Enedelia Pang is a 49 y.o. female with history of migraines who presents to the Emergency Department for evaluation of worsening left sided migraine onset 3 days ago. Patient reports that she woke up with the headache and it has been gradually worsening since that time.  It is consistent with her prior migraine headaches and is not the worst headache of her life.  Her headache is associated with nausea, vomiting, photophobia and right-sided weakness which are all typical of her migraines. Denies any phonophobia. Patient adds that she takes Benadryl and Toradol injections for mild alleviation. Her last Toradol injection was at 4 pm.  She does take multiple preventive medications which occasionally are not helpful.  Patient denies any use of drugs or alcohol. Patient is currently being seen by Dr. Ruffin, neurologist, for her migraines.     Patient's current medications are listed below and she is compliant     REVIEW OF SYSTEMS  Pertinent positives include right sided migraine, nausea, vomiting, photophobia, and right-sided weakness. Pertinent negatives include no sensitivity to sound. As above, all other systems reviewed and are negative.   See HPI for further details.     PAST MEDICAL HISTORY   has a past medical history of Allergy, unspecified not elsewhere classified, Anemia (10/05/2017), Anxiety, autoimmune, Depression, H/O Clostridium difficile infection, MRSA infection, Hydrocephalus, Migraine with aura, with intractable migraine, so stated, without mention of status migrainosus, MRSA (methicillin resistant Staphylococcus aureus), Pituitary abnormality (HCC), Staph infection, and Stroke  ([facility]).    SURGICAL HISTORY   has a past surgical history that includes cholecystectomy; tonsillectomy; appendectomy; tubal ligation; other; other neurological surg; irrigation & debridement neuro (N/A, 6/28/2015); lumbar exploration (N/A, 8/31/2015); spinal cord stimulator (12/19/2016); and  shunt insertion (5/31/2017).    SOCIAL HISTORY  Social History     Tobacco Use   • Smoking status: Never Smoker   • Smokeless tobacco: Never Used   Substance Use Topics   • Alcohol use: Yes     Comment: Rare   • Drug use: No      Social History     Substance and Sexual Activity   Drug Use No       FAMILY HISTORY  Family History   Problem Relation Age of Onset   • No Known Problems Mother    • No Known Problems Father        CURRENT MEDICATIONS  No current facility-administered medications on file prior to encounter.      Current Outpatient Medications on File Prior to Encounter   Medication Sig Dispense Refill   • cephALEXin (KEFLEX) 500 MG Cap Take 500 mg by mouth 6 Times a Day.     • ketorolac (TORADOL) 30 MG/ML Solution 15 mg by Intravenous route every 6 hours as needed.     • divalproex ER (DEPAKOTE ER) 500 MG TABLET SR 24 HR Take 500 mg by mouth 2 Times a Day.     • oxyCODONE immediate release (ROXICODONE) 10 MG immediate release tablet Take 10 mg by mouth every four hours as needed for Moderate Pain.     • levetiracetam (KEPPRA) 1000 MG tablet Take 1,500 mg by mouth 2 Times a Day.     • risperiDONE (RISPERDAL) 1 MG Tab Take 2 mg by mouth 2 times a day.     • diphenhydrAMINE (BENADRYL) 50 MG/ML Solution 50 mg by Intramuscular route every 6 hours as needed.     • Calcium Citrate-Vitamin D (CALCIUM + D PO) Take 1 Tab by mouth every morning.     • aspirin (ASA) 81 MG Chew Tab chewable tablet Take 81 mg by mouth every morning.     • gabapentin (NEURONTIN) 600 MG tablet Take 600 mg by mouth 3 times a day.     • duloxetine (CYMBALTA) 60 MG CPEP delayed-release capsule Take 60 mg by mouth 2 times a day.    "      ALLERGIES  Allergies   Allergen Reactions   • Sumatriptan Unspecified     Historical=\"Heart stops.\" Reaction  between 1995 to 1997   • Prochlorperazine Swelling     Tongue swelling. Reaction as a teen. (compazine)  Tolerated Phenergan on 02/24/15   • Vancomycin Shortness of Breath and Rash     Reaction in 2005.  D/W patient 8/31/15 - tolerated loading dose of vancomycin administered on 8/30/15 with some itching to chest with decreased infusion rate. Red Man Syndrome       PHYSICAL EXAM  VITAL SIGNS: /100   Pulse (!) 113   Temp 36 °C (96.8 °F) (Temporal)   Resp 14   Ht 1.6 m (5' 3\")   Wt 77 kg (169 lb 12.1 oz)   LMP 06/26/2019   SpO2 97%   BMI 30.07 kg/m²   Vitals reviewed.  Constitutional: Appears uncomfortable and is tremulous.   HENT: No signs of trauma, Bilateral external ears normal, Nose normal.  Tacky mucous membranes.  Eyes: Pupils are equal and reactive, Conjunctiva normal, Non-icteric.   Neck: Normal range of motion, No tenderness, Supple, No stridor.   Lymphatic: No lymphadenopathy noted.   Cardiovascular: Tachycardic with regular rhythm, no murmurs.   Thorax & Lungs: Normal breath sounds, No respiratory distress, No wheezing, No chest tenderness.   Abdomen: Bowel sounds normal, Soft, No tenderness, No peritoneal signs, No masses, No pulsatile masses.   Skin: Warm, Dry, No erythema, No rash.   Back: Normal alignment.   Extremities: Intact distal pulses, No edema, No tenderness, No cyanosis  Musculoskeletal: Good range of motion in all major joints. No major deformities noted.   Neurologic: CN II-XII intact. Strength and sensation equal bilaterally. No pronator drift. Normal finger to nose test. Normal heel to shin. No dysdiadochokinesia.  Normal gait.  Psychiatric: Affect normal, Judgment normal, Mood normal.     DIAGNOSTIC STUDIES / PROCEDURES    LABS  Labs Reviewed   VALPROIC ACID      All labs reviewed by me.    COURSE & MEDICAL DECISION MAKING  Nursing notes, VS, PMSFHx reviewed in " chart.  Differential diagnoses include but not limited to: Migraine, tension headache, SAH    12:42 AM Patient seen and examined at bedside. Patient arrives tachycardic but afebrile with otherwise normal vital signs. Patient appears dehydrated and uncomfortable but non-toxic.  Normal neurologic exam with the exception of tremor which the patient reports is normal for her when she has her migraines.  Ordered for Valproic Acid to evaluate. Patient will be treated with Reglan 10 mg, Benadryl 50 mg, Toradol 30 mg, and NS infusion 1,000 mL for her symptoms.    Labs reveal slight hypokalemia.  Unlikely subarachnoid hemorrhage as there was no thunderclap onset and the pain has been gradually worsening.  This is not the worst headache of the patient's life and her symptoms are consistent with prior migraines.  Unlikely intracranial mass/neoplasm/stroke normal neurologic exam.    Patient symptoms are improved but she is requesting ketamine.  Review of chart reveals that she has received this medication 6 times, every time that she comes to the hospital with the same symptoms.  For that reason 25 mg of ketamine was ordered.    3:46 AM - Re-examined; The patient is resting in bed and reports her symptoms are resolved.  She would like to go home.  I discussed her plans for discharge. She was given a referral to PCP and instructed to return to the ED if her symptoms worsen. Patient understands and agrees.     The patient will return for new or worsening symptoms and is stable at the time of discharge.    The patient is referred to a primary physician for blood pressure management, diabetic screening, and for all other preventative health concerns.  I have also asked her to follow-up with her neurologist.    HYDRATION: Based on the patient's presentation of Tachycardia the patient was given IV fluids. IV Hydration was used because oral hydration was not adequate alone. Upon recheck following hydration, the patient was  improved.    DISPOSITION:  Patient will be discharged home in stable condition.    FOLLOW UP:  Elliott Power M.D.  [address]  [place] [address]  [phone]    Schedule an appointment as soon as possible for a visit in 1 day  For recheck    Your neurologist    Schedule an appointment as soon as possible for a visit in 1 day  For recheck    FINAL IMPRESSION  1. Migraine without status migrainosus, not intractable, unspecified migraine type          I, Adilson Benjamin (Rob), am scribing for, and in the presence of, Cruz Dumont M.D..    Electronically signed by: Adilson Benjamin (Rob), 8/26/2019    ICruz M.D. personally performed the services described in this documentation, as scribed by Adilson Benjamin in my presence, and it is both accurate and complete.    C    The note accurately reflects work and decisions made by me.  Cruz Dumont  8/26/2019  5:20 AM

## 2019-08-26 NOTE — ED TRIAGE NOTES
Enedelia Pang  49 y.o.  female  Chief Complaint   Patient presents with   • Headache     x 3 days     C/o left sided headache x 3 days. Hx of Migraines. Sensitive to light. + N/V

## 2019-08-26 NOTE — ED NOTES
Port Accessed. Blood drawn, pt medicated per MAR. Will continue to monitor.     Pt ambulated to restroom with assistance from .

## 2019-08-26 NOTE — DISCHARGE INSTRUCTIONS
You were seen in the ER for headache consistent with prior migraine headaches.  We have given you medication to your IV as well as fluids and your headache has resolved.  You are safe to go home.  I would like you to follow-up with your primary care physician and your neurologist this week for recheck.  Return to the ER immediately with new or worsening symptoms.

## 2019-08-30 PROCEDURE — 99285 EMERGENCY DEPT VISIT HI MDM: CPT

## 2019-08-31 ENCOUNTER — HOSPITAL ENCOUNTER (INPATIENT)
Facility: MEDICAL CENTER | Age: 49
LOS: 2 days | DRG: 103 | End: 2019-09-04
Attending: EMERGENCY MEDICINE | Admitting: HOSPITALIST
Payer: MEDICARE

## 2019-08-31 ENCOUNTER — APPOINTMENT (OUTPATIENT)
Dept: RADIOLOGY | Facility: MEDICAL CENTER | Age: 49
DRG: 103 | End: 2019-08-31
Attending: EMERGENCY MEDICINE
Payer: MEDICARE

## 2019-08-31 DIAGNOSIS — G43.901 MIGRAINE WITH STATUS MIGRAINOSUS, NOT INTRACTABLE, UNSPECIFIED MIGRAINE TYPE: ICD-10-CM

## 2019-08-31 DIAGNOSIS — G89.29 CHRONIC INTRACTABLE HEADACHE, UNSPECIFIED HEADACHE TYPE: Primary | ICD-10-CM

## 2019-08-31 DIAGNOSIS — R51.9 CHRONIC INTRACTABLE HEADACHE, UNSPECIFIED HEADACHE TYPE: Primary | ICD-10-CM

## 2019-08-31 PROCEDURE — G0378 HOSPITAL OBSERVATION PER HR: HCPCS

## 2019-08-31 PROCEDURE — 96376 TX/PRO/DX INJ SAME DRUG ADON: CPT

## 2019-08-31 PROCEDURE — 96374 THER/PROPH/DIAG INJ IV PUSH: CPT

## 2019-08-31 PROCEDURE — 96375 TX/PRO/DX INJ NEW DRUG ADDON: CPT

## 2019-08-31 PROCEDURE — A9270 NON-COVERED ITEM OR SERVICE: HCPCS | Performed by: HOSPITALIST

## 2019-08-31 PROCEDURE — 700111 HCHG RX REV CODE 636 W/ 250 OVERRIDE (IP)

## 2019-08-31 PROCEDURE — 70450 CT HEAD/BRAIN W/O DYE: CPT

## 2019-08-31 PROCEDURE — 700102 HCHG RX REV CODE 250 W/ 637 OVERRIDE(OP): Performed by: HOSPITALIST

## 2019-08-31 PROCEDURE — 700105 HCHG RX REV CODE 258: Performed by: EMERGENCY MEDICINE

## 2019-08-31 PROCEDURE — 700105 HCHG RX REV CODE 258: Performed by: HOSPITALIST

## 2019-08-31 PROCEDURE — 700111 HCHG RX REV CODE 636 W/ 250 OVERRIDE (IP): Performed by: HOSPITALIST

## 2019-08-31 PROCEDURE — 99220 PR INITIAL OBSERVATION CARE,LEVL III: CPT | Performed by: HOSPITALIST

## 2019-08-31 PROCEDURE — 700111 HCHG RX REV CODE 636 W/ 250 OVERRIDE (IP): Performed by: EMERGENCY MEDICINE

## 2019-08-31 PROCEDURE — 700101 HCHG RX REV CODE 250: Performed by: EMERGENCY MEDICINE

## 2019-08-31 RX ORDER — HYDROMORPHONE HYDROCHLORIDE 1 MG/ML
1 INJECTION, SOLUTION INTRAMUSCULAR; INTRAVENOUS; SUBCUTANEOUS ONCE
Status: COMPLETED | OUTPATIENT
Start: 2019-08-31 | End: 2019-08-31

## 2019-08-31 RX ORDER — KETOROLAC TROMETHAMINE 30 MG/ML
60 INJECTION, SOLUTION INTRAMUSCULAR; INTRAVENOUS
COMMUNITY
Start: 2019-08-04 | End: 2019-10-29

## 2019-08-31 RX ORDER — LABETALOL HYDROCHLORIDE 5 MG/ML
10 INJECTION, SOLUTION INTRAVENOUS EVERY 4 HOURS PRN
Status: DISCONTINUED | OUTPATIENT
Start: 2019-08-31 | End: 2019-09-04 | Stop reason: HOSPADM

## 2019-08-31 RX ORDER — ASPIRIN 81 MG/1
81 TABLET, CHEWABLE ORAL EVERY MORNING
Status: DISCONTINUED | OUTPATIENT
Start: 2019-08-31 | End: 2019-09-04 | Stop reason: HOSPADM

## 2019-08-31 RX ORDER — DULOXETIN HYDROCHLORIDE 60 MG/1
60 CAPSULE, DELAYED RELEASE ORAL 2 TIMES DAILY
Status: DISCONTINUED | OUTPATIENT
Start: 2019-08-31 | End: 2019-09-04 | Stop reason: HOSPADM

## 2019-08-31 RX ORDER — LEVETIRACETAM 500 MG/1
500 TABLET ORAL 2 TIMES DAILY
Status: ON HOLD | COMMUNITY
Start: 2019-07-24 | End: 2020-02-24

## 2019-08-31 RX ORDER — DEXAMETHASONE SODIUM PHOSPHATE 4 MG/ML
10 INJECTION, SOLUTION INTRA-ARTICULAR; INTRALESIONAL; INTRAMUSCULAR; INTRAVENOUS; SOFT TISSUE ONCE
Status: COMPLETED | OUTPATIENT
Start: 2019-08-31 | End: 2019-08-31

## 2019-08-31 RX ORDER — OXYCODONE HYDROCHLORIDE 10 MG/1
10 TABLET ORAL EVERY 4 HOURS PRN
Status: DISCONTINUED | OUTPATIENT
Start: 2019-08-31 | End: 2019-09-02

## 2019-08-31 RX ORDER — POLYETHYLENE GLYCOL 3350 17 G/17G
1 POWDER, FOR SOLUTION ORAL
Status: DISCONTINUED | OUTPATIENT
Start: 2019-08-31 | End: 2019-09-03

## 2019-08-31 RX ORDER — PROMETHAZINE HYDROCHLORIDE 25 MG/1
12.5-25 TABLET ORAL EVERY 4 HOURS PRN
Status: DISCONTINUED | OUTPATIENT
Start: 2019-08-31 | End: 2019-09-03

## 2019-08-31 RX ORDER — CEPHALEXIN 500 MG/1
500 CAPSULE ORAL
Status: DISCONTINUED | OUTPATIENT
Start: 2019-08-31 | End: 2019-08-31

## 2019-08-31 RX ORDER — GALCANEZUMAB 120 MG/ML
120 INJECTION, SOLUTION SUBCUTANEOUS
COMMUNITY
Start: 2019-07-15 | End: 2021-04-08

## 2019-08-31 RX ORDER — DIPHENHYDRAMINE HYDROCHLORIDE 50 MG/ML
50 INJECTION INTRAMUSCULAR; INTRAVENOUS ONCE
Status: COMPLETED | OUTPATIENT
Start: 2019-08-31 | End: 2019-08-31

## 2019-08-31 RX ORDER — DIPHENHYDRAMINE HYDROCHLORIDE 50 MG/ML
50 INJECTION INTRAMUSCULAR; INTRAVENOUS EVERY 6 HOURS PRN
Status: DISCONTINUED | OUTPATIENT
Start: 2019-08-31 | End: 2019-09-03

## 2019-08-31 RX ORDER — RISPERIDONE 2 MG/1
2 TABLET ORAL 2 TIMES DAILY
COMMUNITY

## 2019-08-31 RX ORDER — PROMETHAZINE HYDROCHLORIDE 25 MG/1
12.5-25 SUPPOSITORY RECTAL EVERY 4 HOURS PRN
Status: DISCONTINUED | OUTPATIENT
Start: 2019-08-31 | End: 2019-09-03

## 2019-08-31 RX ORDER — DIPHENHYDRAMINE HYDROCHLORIDE 50 MG/ML
50 INJECTION INTRAMUSCULAR; INTRAVENOUS EVERY 6 HOURS PRN
Status: DISCONTINUED | OUTPATIENT
Start: 2019-08-31 | End: 2019-08-31

## 2019-08-31 RX ORDER — POTASSIUM CHLORIDE 20 MEQ/1
20 TABLET, EXTENDED RELEASE ORAL 2 TIMES DAILY
Status: DISCONTINUED | OUTPATIENT
Start: 2019-08-31 | End: 2019-09-02

## 2019-08-31 RX ORDER — RISPERIDONE 0.5 MG/1
2 TABLET ORAL 2 TIMES DAILY
Status: DISCONTINUED | OUTPATIENT
Start: 2019-08-31 | End: 2019-09-04 | Stop reason: HOSPADM

## 2019-08-31 RX ORDER — ONDANSETRON 2 MG/ML
4 INJECTION INTRAMUSCULAR; INTRAVENOUS EVERY 4 HOURS PRN
Status: DISCONTINUED | OUTPATIENT
Start: 2019-08-31 | End: 2019-09-04 | Stop reason: HOSPADM

## 2019-08-31 RX ORDER — METOCLOPRAMIDE HYDROCHLORIDE 5 MG/ML
10 INJECTION INTRAMUSCULAR; INTRAVENOUS ONCE
Status: COMPLETED | OUTPATIENT
Start: 2019-08-31 | End: 2019-08-31

## 2019-08-31 RX ORDER — BUSPIRONE HYDROCHLORIDE 5 MG/1
5 TABLET ORAL 2 TIMES DAILY
COMMUNITY
Start: 2019-07-31 | End: 2020-01-28

## 2019-08-31 RX ORDER — ONDANSETRON 4 MG/1
4 TABLET, ORALLY DISINTEGRATING ORAL EVERY 4 HOURS PRN
Status: DISCONTINUED | OUTPATIENT
Start: 2019-08-31 | End: 2019-09-04 | Stop reason: HOSPADM

## 2019-08-31 RX ORDER — KETOROLAC TROMETHAMINE 30 MG/ML
15 INJECTION, SOLUTION INTRAMUSCULAR; INTRAVENOUS EVERY 6 HOURS PRN
Status: DISCONTINUED | OUTPATIENT
Start: 2019-08-31 | End: 2019-09-04 | Stop reason: HOSPADM

## 2019-08-31 RX ORDER — PROCHLORPERAZINE EDISYLATE 5 MG/ML
5-10 INJECTION INTRAMUSCULAR; INTRAVENOUS EVERY 4 HOURS PRN
Status: DISCONTINUED | OUTPATIENT
Start: 2019-08-31 | End: 2019-08-31

## 2019-08-31 RX ORDER — ENALAPRILAT 1.25 MG/ML
1.25 INJECTION INTRAVENOUS EVERY 6 HOURS PRN
Status: DISCONTINUED | OUTPATIENT
Start: 2019-08-31 | End: 2019-09-04 | Stop reason: HOSPADM

## 2019-08-31 RX ORDER — CLONIDINE HYDROCHLORIDE 0.1 MG/1
0.1 TABLET ORAL EVERY 6 HOURS PRN
Status: DISCONTINUED | OUTPATIENT
Start: 2019-08-31 | End: 2019-09-04 | Stop reason: HOSPADM

## 2019-08-31 RX ORDER — GABAPENTIN 300 MG/1
600 CAPSULE ORAL 3 TIMES DAILY
Status: DISCONTINUED | OUTPATIENT
Start: 2019-08-31 | End: 2019-09-04 | Stop reason: HOSPADM

## 2019-08-31 RX ORDER — SODIUM CHLORIDE 9 MG/ML
INJECTION, SOLUTION INTRAVENOUS CONTINUOUS
Status: ACTIVE | OUTPATIENT
Start: 2019-08-31 | End: 2019-08-31

## 2019-08-31 RX ORDER — BISACODYL 10 MG
10 SUPPOSITORY, RECTAL RECTAL
Status: DISCONTINUED | OUTPATIENT
Start: 2019-08-31 | End: 2019-09-03

## 2019-08-31 RX ORDER — LEVETIRACETAM 500 MG/1
1500 TABLET ORAL 2 TIMES DAILY
Status: DISCONTINUED | OUTPATIENT
Start: 2019-08-31 | End: 2019-09-04 | Stop reason: HOSPADM

## 2019-08-31 RX ORDER — SODIUM CHLORIDE 9 MG/ML
1000 INJECTION, SOLUTION INTRAVENOUS ONCE
Status: COMPLETED | OUTPATIENT
Start: 2019-08-31 | End: 2019-08-31

## 2019-08-31 RX ORDER — DIVALPROEX SODIUM 500 MG/1
500 TABLET, EXTENDED RELEASE ORAL 2 TIMES DAILY
Status: DISCONTINUED | OUTPATIENT
Start: 2019-08-31 | End: 2019-09-04 | Stop reason: HOSPADM

## 2019-08-31 RX ORDER — HYDROMORPHONE HYDROCHLORIDE 1 MG/ML
1 INJECTION, SOLUTION INTRAMUSCULAR; INTRAVENOUS; SUBCUTANEOUS
Status: DISCONTINUED | OUTPATIENT
Start: 2019-08-31 | End: 2019-09-01

## 2019-08-31 RX ORDER — ACETAMINOPHEN 325 MG/1
650 TABLET ORAL EVERY 6 HOURS PRN
Status: DISCONTINUED | OUTPATIENT
Start: 2019-08-31 | End: 2019-09-04 | Stop reason: HOSPADM

## 2019-08-31 RX ORDER — AMOXICILLIN 250 MG
2 CAPSULE ORAL 2 TIMES DAILY
Status: DISCONTINUED | OUTPATIENT
Start: 2019-08-31 | End: 2019-09-03

## 2019-08-31 RX ORDER — KETOROLAC TROMETHAMINE 30 MG/ML
15 INJECTION, SOLUTION INTRAMUSCULAR; INTRAVENOUS ONCE
Status: COMPLETED | OUTPATIENT
Start: 2019-08-31 | End: 2019-08-31

## 2019-08-31 RX ADMIN — LEVETIRACETAM 1500 MG: 500 TABLET, FILM COATED ORAL at 09:31

## 2019-08-31 RX ADMIN — RISPERIDONE 2 MG: 2 TABLET ORAL at 09:32

## 2019-08-31 RX ADMIN — DIVALPROEX SODIUM 500 MG: 500 TABLET, EXTENDED RELEASE ORAL at 16:13

## 2019-08-31 RX ADMIN — GABAPENTIN 600 MG: 300 CAPSULE ORAL at 09:35

## 2019-08-31 RX ADMIN — DIVALPROEX SODIUM 500 MG: 500 TABLET, EXTENDED RELEASE ORAL at 10:03

## 2019-08-31 RX ADMIN — DIPHENHYDRAMINE HYDROCHLORIDE 50 MG: 50 INJECTION, SOLUTION INTRAMUSCULAR; INTRAVENOUS at 16:24

## 2019-08-31 RX ADMIN — METOCLOPRAMIDE 10 MG: 5 INJECTION, SOLUTION INTRAMUSCULAR; INTRAVENOUS at 01:29

## 2019-08-31 RX ADMIN — HYDROMORPHONE HYDROCHLORIDE 1 MG: 1 INJECTION, SOLUTION INTRAMUSCULAR; INTRAVENOUS; SUBCUTANEOUS at 20:43

## 2019-08-31 RX ADMIN — HYDROMORPHONE HYDROCHLORIDE 1 MG: 1 INJECTION, SOLUTION INTRAMUSCULAR; INTRAVENOUS; SUBCUTANEOUS at 17:48

## 2019-08-31 RX ADMIN — SODIUM CHLORIDE 1000 ML: 9 INJECTION, SOLUTION INTRAVENOUS at 02:09

## 2019-08-31 RX ADMIN — DEXAMETHASONE SODIUM PHOSPHATE 10 MG: 4 INJECTION, SOLUTION INTRA-ARTICULAR; INTRALESIONAL; INTRAMUSCULAR; INTRAVENOUS; SOFT TISSUE at 06:50

## 2019-08-31 RX ADMIN — LEVETIRACETAM 1500 MG: 500 TABLET, FILM COATED ORAL at 16:12

## 2019-08-31 RX ADMIN — RISPERIDONE 2 MG: 2 TABLET ORAL at 20:14

## 2019-08-31 RX ADMIN — DIPHENHYDRAMINE HYDROCHLORIDE 50 MG: 50 INJECTION INTRAMUSCULAR; INTRAVENOUS at 01:29

## 2019-08-31 RX ADMIN — DULOXETINE HYDROCHLORIDE 60 MG: 60 CAPSULE, DELAYED RELEASE ORAL at 09:38

## 2019-08-31 RX ADMIN — HYDROMORPHONE HYDROCHLORIDE 1 MG: 1 INJECTION, SOLUTION INTRAMUSCULAR; INTRAVENOUS; SUBCUTANEOUS at 06:50

## 2019-08-31 RX ADMIN — SENNOSIDES, DOCUSATE SODIUM 2 TABLET: 50; 8.6 TABLET, FILM COATED ORAL at 16:12

## 2019-08-31 RX ADMIN — SODIUM CHLORIDE: 9 INJECTION, SOLUTION INTRAVENOUS at 13:47

## 2019-08-31 RX ADMIN — ONDANSETRON 4 MG: 4 TABLET, ORALLY DISINTEGRATING ORAL at 17:53

## 2019-08-31 RX ADMIN — ASPIRIN 81 MG 81 MG: 81 TABLET ORAL at 09:35

## 2019-08-31 RX ADMIN — KETAMINE HYDROCHLORIDE 25 MG: 10 INJECTION, SOLUTION INTRAMUSCULAR; INTRAVENOUS at 04:15

## 2019-08-31 RX ADMIN — DULOXETINE HYDROCHLORIDE 60 MG: 60 CAPSULE, DELAYED RELEASE ORAL at 20:14

## 2019-08-31 RX ADMIN — OXYCODONE HYDROCHLORIDE 10 MG: 10 TABLET ORAL at 10:02

## 2019-08-31 RX ADMIN — HYDROMORPHONE HYDROCHLORIDE 1 MG: 1 INJECTION, SOLUTION INTRAMUSCULAR; INTRAVENOUS; SUBCUTANEOUS at 13:46

## 2019-08-31 RX ADMIN — HYDROMORPHONE HYDROCHLORIDE 1 MG: 1 INJECTION, SOLUTION INTRAMUSCULAR; INTRAVENOUS; SUBCUTANEOUS at 03:12

## 2019-08-31 RX ADMIN — DIPHENHYDRAMINE HYDROCHLORIDE 50 MG: 50 INJECTION, SOLUTION INTRAMUSCULAR; INTRAVENOUS at 22:09

## 2019-08-31 RX ADMIN — HYDROMORPHONE HYDROCHLORIDE 1 MG: 1 INJECTION, SOLUTION INTRAMUSCULAR; INTRAVENOUS; SUBCUTANEOUS at 23:39

## 2019-08-31 RX ADMIN — KETOROLAC TROMETHAMINE 15 MG: 30 INJECTION, SOLUTION INTRAMUSCULAR at 01:29

## 2019-08-31 RX ADMIN — POTASSIUM CHLORIDE 20 MEQ: 20 TABLET, EXTENDED RELEASE ORAL at 17:48

## 2019-08-31 RX ADMIN — KETOROLAC TROMETHAMINE 15 MG: 30 INJECTION, SOLUTION INTRAMUSCULAR; INTRAVENOUS at 22:21

## 2019-08-31 RX ADMIN — KETOROLAC TROMETHAMINE 15 MG: 30 INJECTION, SOLUTION INTRAMUSCULAR; INTRAVENOUS at 16:14

## 2019-08-31 RX ADMIN — GABAPENTIN 600 MG: 300 CAPSULE ORAL at 16:12

## 2019-08-31 SDOH — HEALTH STABILITY: MENTAL HEALTH: HOW OFTEN DO YOU HAVE A DRINK CONTAINING ALCOHOL?: NEVER

## 2019-08-31 ASSESSMENT — COPD QUESTIONNAIRES
HAVE YOU SMOKED AT LEAST 100 CIGARETTES IN YOUR ENTIRE LIFE: NO/DON'T KNOW
DO YOU EVER COUGH UP ANY MUCUS OR PHLEGM?: NO/ONLY WITH OCCASIONAL COLDS OR INFECTIONS
DURING THE PAST 4 WEEKS HOW MUCH DID YOU FEEL SHORT OF BREATH: NONE/LITTLE OF THE TIME
COPD SCREENING SCORE: 0
IN THE PAST 12 MONTHS DO YOU DO LESS THAN YOU USED TO BECAUSE OF YOUR BREATHING PROBLEMS: DISAGREE/UNSURE

## 2019-08-31 ASSESSMENT — LIFESTYLE VARIABLES
HAVE YOU EVER FELT YOU SHOULD CUT DOWN ON YOUR DRINKING: NO
EVER HAD A DRINK FIRST THING IN THE MORNING TO STEADY YOUR NERVES TO GET RID OF A HANGOVER: NO
TOTAL SCORE: 0
TOTAL SCORE: 0
AVERAGE NUMBER OF DAYS PER WEEK YOU HAVE A DRINK CONTAINING ALCOHOL: 0
HAVE YOU EVER FELT YOU SHOULD CUT DOWN ON YOUR DRINKING: NO
TOTAL SCORE: 0
DO YOU DRINK ALCOHOL: NO
HOW MANY TIMES IN THE PAST YEAR HAVE YOU HAD 5 OR MORE DRINKS IN A DAY: 0
HAVE PEOPLE ANNOYED YOU BY CRITICIZING YOUR DRINKING: NO
TOTAL SCORE: 0
DOES PATIENT WANT TO STOP DRINKING: NO
TOTAL SCORE: 0
CONSUMPTION TOTAL: INCOMPLETE
ON A TYPICAL DAY WHEN YOU DRINK ALCOHOL HOW MANY DRINKS DO YOU HAVE: 0
TOTAL SCORE: 0
HAVE PEOPLE ANNOYED YOU BY CRITICIZING YOUR DRINKING: NO
EVER_SMOKED: NEVER
EVER HAD A DRINK FIRST THING IN THE MORNING TO STEADY YOUR NERVES TO GET RID OF A HANGOVER: NO
CONSUMPTION TOTAL: NEGATIVE
EVER FELT BAD OR GUILTY ABOUT YOUR DRINKING: NO
ALCOHOL_USE: NO
EVER FELT BAD OR GUILTY ABOUT YOUR DRINKING: NO

## 2019-08-31 ASSESSMENT — PATIENT HEALTH QUESTIONNAIRE - PHQ9
5. POOR APPETITE OR OVEREATING: SEVERAL DAYS
9. THOUGHTS THAT YOU WOULD BE BETTER OFF DEAD, OR OF HURTING YOURSELF: NOT AT ALL
SUM OF ALL RESPONSES TO PHQ9 QUESTIONS 1 AND 2: 2
2. FEELING DOWN, DEPRESSED, IRRITABLE, OR HOPELESS: SEVERAL DAYS
7. TROUBLE CONCENTRATING ON THINGS, SUCH AS READING THE NEWSPAPER OR WATCHING TELEVISION: NEARLY EVERY DAY
6. FEELING BAD ABOUT YOURSELF - OR THAT YOU ARE A FAILURE OR HAVE LET YOURSELF OR YOUR FAMILY DOWN: SEVERAL DAYS
3. TROUBLE FALLING OR STAYING ASLEEP OR SLEEPING TOO MUCH: SEVERAL DAYS
4. FEELING TIRED OR HAVING LITTLE ENERGY: NEARLY EVERY DAY
1. LITTLE INTEREST OR PLEASURE IN DOING THINGS: SEVERAL DAYS

## 2019-08-31 ASSESSMENT — ENCOUNTER SYMPTOMS
GASTROINTESTINAL NEGATIVE: 1
WHEEZING: 0
DIARRHEA: 0
FEVER: 0
NERVOUS/ANXIOUS: 0
MUSCULOSKELETAL NEGATIVE: 1
COUGH: 0
HEMOPTYSIS: 0
SEIZURES: 0
DOUBLE VISION: 0
CONSTIPATION: 0
RESPIRATORY NEGATIVE: 1
NAUSEA: 0
BLOOD IN STOOL: 0
BRUISES/BLEEDS EASILY: 0
FOCAL WEAKNESS: 0
PSYCHIATRIC NEGATIVE: 1
LOSS OF CONSCIOUSNESS: 0
CONSTITUTIONAL NEGATIVE: 1
DIAPHORESIS: 0
EYES NEGATIVE: 1
HEADACHES: 1
PALPITATIONS: 0
VOMITING: 0
DIZZINESS: 0
CHILLS: 0
ABDOMINAL PAIN: 0
CARDIOVASCULAR NEGATIVE: 1
HEARTBURN: 0

## 2019-08-31 ASSESSMENT — COGNITIVE AND FUNCTIONAL STATUS - GENERAL
HELP NEEDED FOR BATHING: A LITTLE
DAILY ACTIVITIY SCORE: 20
CLIMB 3 TO 5 STEPS WITH RAILING: A LITTLE
DRESSING REGULAR UPPER BODY CLOTHING: A LITTLE
DRESSING REGULAR LOWER BODY CLOTHING: A LITTLE
MOBILITY SCORE: 23
TOILETING: A LITTLE
SUGGESTED CMS G CODE MODIFIER DAILY ACTIVITY: CJ
SUGGESTED CMS G CODE MODIFIER MOBILITY: CI

## 2019-08-31 NOTE — ED NOTES
Patient resting in bed, sleeping, no signs of pain or distress, unlabored breathing noted, attached to cardiac monitor, bed in low position, call light within reach, IV ketamine completed,  at bedside.

## 2019-08-31 NOTE — ED NOTES
Patient awake alert and oriented x 4, Glascow 15, bed in low position, call light within reach, on room air, attached to cardiac monitor, unlabored breathing noted, no cough noted, interacts with staff, interactions noted as appropriate, covers eyes from light sensitivity,  at bedside using tablet.

## 2019-08-31 NOTE — ED TRIAGE NOTES
"Chief Complaint   Patient presents with   • Migraine     came on quickly and intense per pt     Pt ambulatory to triage for above complaint. Pt was watching TV at home when the migraine came on suddenly. Pt usually gets an aura before with a gradual increase but this one \"is different than normal\". Pt rates 9/10 for pain. Pt reports vomiting about 12 times.     BP (!) 161/101   Pulse (!) 120   Temp 36 °C (96.8 °F) (Temporal)   Resp 14   Ht 1.6 m (5' 3\")   Wt 77.1 kg (170 lb)   SpO2 100%   BMI 30.11 kg/m²     "

## 2019-08-31 NOTE — ED NOTES
Report recd and care assumed.  Pt is resting in bed with eyes closed.  She opened eyes to normal level speech.  Vital signs stable and cycling.  Safety maintained.  Will continue to monitor.

## 2019-08-31 NOTE — H&P
Hospital Medicine History & Physical Note    Date of Service  8/31/2019    Primary Care Physician  Elliott Power M.D.    Consultants  Neurology    Code Status  Full code    Chief Complaint  Headache    History of Presenting Illness  49 y.o. female who presented 8/31/2019 with a history of a  shunt for pseudotumor cerebri.  The patient comes in because of a severe headache.  The patient had a CAT scan which does not show at this point an abnormal function of the shunt.  There is no increased intracranial pressure.  The patient's headache however is nonrelenting.  She has so far been given ketamine as well as 2 IV injections of Dilaudid every 3 hours.  The patient at this point will need continued pain management.  The patient will be admitted to the neurology floor for continued monitoring.    Review of Systems  Review of Systems   Constitutional: Negative.  Negative for chills, diaphoresis and fever.   HENT: Negative.    Eyes: Negative.  Negative for double vision.   Respiratory: Negative.  Negative for cough, hemoptysis and wheezing.    Cardiovascular: Negative.  Negative for chest pain, palpitations and leg swelling.   Gastrointestinal: Negative.  Negative for abdominal pain, blood in stool, constipation, diarrhea, heartburn, nausea and vomiting.   Genitourinary: Negative.  Negative for frequency, hematuria and urgency.   Musculoskeletal: Negative.  Negative for joint pain.   Skin: Negative.  Negative for itching and rash.   Neurological: Positive for headaches. Negative for dizziness, focal weakness, seizures and loss of consciousness.   Endo/Heme/Allergies: Negative.  Does not bruise/bleed easily.   Psychiatric/Behavioral: Negative.  Negative for suicidal ideas. The patient is not nervous/anxious.        Past Medical History   has a past medical history of Allergy, unspecified not elsewhere classified, Anemia (10/05/2017), Anxiety, autoimmune, Depression, H/O Clostridium difficile infection, MRSA  "infection, Hydrocephalus, Migraine with aura, with intractable migraine, so stated, without mention of status migrainosus, MRSA (methicillin resistant Staphylococcus aureus), Pituitary abnormality (HCC), Staph infection, and Stroke ([facility]). She also has no past medical history of Addisons disease ([facility]).    Surgical History   has a past surgical history that includes cholecystectomy; tonsillectomy; appendectomy; tubal ligation; other; other neurological surg; irrigation & debridement neuro (N/A, 2015); lumbar exploration (N/A, 2015); spinal cord stimulator (2016); and  shunt insertion (2017).     Family History  I discussed the family history with the patient and at this point find no pertinent information as relevant to her care.    Social History   reports that she has never smoked. She has never used smokeless tobacco. She reports that she drinks alcohol. She reports that she does not use drugs.    Allergies  Allergies   Allergen Reactions   • Sumatriptan Unspecified     Historical=\"Heart stops.\" Reaction  between  to    • Prochlorperazine Swelling     Tongue swelling. Reaction as a teen. (compazine)  Tolerated Phenergan on 02/24/15   • Vancomycin Shortness of Breath and Rash     Reaction in .  D/W patient 8/31/15 - tolerated loading dose of vancomycin administered on 8/30/15 with some itching to chest with decreased infusion rate. Red Man Syndrome       Medications  Prior to Admission Medications   Prescriptions Last Dose Informant Patient Reported? Taking?   Calcium Citrate-Vitamin D (CALCIUM + D PO)  Patient Yes No   Sig: Take 1 Tab by mouth every morning.   aspirin (ASA) 81 MG Chew Tab chewable tablet  Patient Yes No   Sig: Take 81 mg by mouth every morning.   cephALEXin (KEFLEX) 500 MG Cap   Yes No   Sig: Take 500 mg by mouth 6 Times a Day.   diphenhydrAMINE (BENADRYL) 50 MG/ML Solution  Patient Yes No   Si mg by Intramuscular route every 6 hours as needed.   divalproex " ER (DEPAKOTE ER) 500 MG TABLET SR 24 HR  Patient Yes No   Sig: Take 500 mg by mouth 2 Times a Day.   duloxetine (CYMBALTA) 60 MG CPEP delayed-release capsule  Patient Yes No   Sig: Take 60 mg by mouth 2 times a day.   gabapentin (NEURONTIN) 600 MG tablet  Patient Yes No   Sig: Take 600 mg by mouth 3 times a day.   ketorolac (TORADOL) 30 MG/ML Solution  Patient Yes No   Sig: 15 mg by Intravenous route every 6 hours as needed.   levetiracetam (KEPPRA) 1000 MG tablet  Patient Yes No   Sig: Take 1,500 mg by mouth 2 Times a Day.   oxyCODONE immediate release (ROXICODONE) 10 MG immediate release tablet  Patient Yes No   Sig: Take 10 mg by mouth every four hours as needed for Moderate Pain.   risperiDONE (RISPERDAL) 1 MG Tab  Patient Yes No   Sig: Take 2 mg by mouth 2 times a day.      Facility-Administered Medications: None       Physical Exam  Temp:  [36 °C (96.8 °F)] 36 °C (96.8 °F)  Pulse:  [] 106  Resp:  [14-17] 16  BP: (100-161)/() 120/67  SpO2:  [92 %-100 %] 95 %    Physical Exam   Constitutional: She is oriented to person, place, and time. She appears well-developed and well-nourished.   HENT:   Head: Normocephalic and atraumatic.   Right Ear: External ear normal.   Left Ear: External ear normal.   Nose: Nose normal.   Mouth/Throat: Oropharynx is clear and moist.   Eyes: Pupils are equal, round, and reactive to light. Conjunctivae and EOM are normal.   Neck: Normal range of motion. Neck supple. No JVD present. No thyromegaly present.   Cardiovascular: Normal rate and regular rhythm.   No murmur heard.  Pulmonary/Chest: She has no wheezes. She has no rales. She exhibits no tenderness.   Abdominal: She exhibits no distension and no mass. There is no tenderness. There is no rebound and no guarding.   Musculoskeletal: Normal range of motion. She exhibits no edema or tenderness.   Lymphadenopathy:     She has no cervical adenopathy.   Neurological: She is alert and oriented to person, place, and time. She  has normal reflexes. No cranial nerve deficit.   Skin: Skin is warm and dry. No rash noted. No erythema.   Psychiatric: She has a normal mood and affect.   Nursing note and vitals reviewed.      Laboratory:          No results for input(s): ALTSGPT, ASTSGOT, ALKPHOSPHAT, TBILIRUBIN, DBILIRUBIN, GAMMAGT, AMYLASE, LIPASE, ALB, PREALBUMIN, GLUCOSE in the last 72 hours.      No results for input(s): NTPROBNP in the last 72 hours.      No results for input(s): TROPONINT in the last 72 hours.    Urinalysis:    No results found     Imaging:  CT-HEAD W/O   Final Result         1.  No acute intracranial abnormality.   2.  Ventriculoperitoneal shunt, bilateral ventricles appear stable in size compared to prior study.            Assessment/Plan:  I anticipate this patient will require at least two midnights for appropriate medical management, necessitating inpatient admission.    Substance dependence (HCC)- (present on admission)  Assessment & Plan  Patient has chronic use of narcotics and at this point the narcotic use is been complicating her migraine as well.    Seizures (HCC)- (present on admission)  Assessment & Plan  Patient has a history of seizures.  At this point patient will be placed on her Depakote as well as Keppra.  We will monitor her for seizure disorder.    Headache and focal deficits, likely secondary to Complicated migraine- (present on admission)  Assessment & Plan  Patient has a history of pseudotumor cerebri.  The patient has a  shunt in place.  The patient has chronic migraines.  The patient comes in with an unrelenting migraine headache.  Initially it was 10 out of 10 the patient has had several doses of IV Dilaudid in the emergency room with no benefit.  The patient has an aura with it.  The patient is nauseous and is been vomiting continuously.  Patient at this point will need IV fluids and aggressive pain management to get her headache under control.  I believe the patient will be in the hospital  for at least 2 midnights if not longer.    Depression- (present on admission)  Assessment & Plan  Patient has chronic depression and she is at this point medicated with Cymbalta for this.  It is difficult to  if the patient's Cymbalta and BuSpar are working for her as the patient currently is having nausea and vomiting and    Anxiety- (present on admission)  Assessment & Plan  Patient will need aggressive anxiolytic management as the patient is extremely anxious with her migraine as well as nausea vomiting.    S/P  shunt- (present on admission)  Assessment & Plan  Patient's  shunt was evaluated imaging studies and at this point seems to be working appropriately.  If necessary to see her neurosurgery can be consulted for evaluation.    Hyperlipidemia- (present on admission)  Assessment & Plan  Low-fat low-cholesterol diet to be continued.  Statin  Lab Results   Component Value Date/Time    CHOLSTRLTOT 150 05/22/2017 03:31 AM    LDL 86 05/22/2017 03:31 AM    HDL 52 05/22/2017 03:31 AM    TRIGLYCERIDE 61 05/22/2017 03:31 AM            Rheumatoid arthritis(714.0)- (present on admission)  Assessment & Plan  Continue at this point with pain management optimization.      VTE prophylaxis: SCDs

## 2019-08-31 NOTE — ED NOTES
Patient awake alert and oriented x 4, Glascow 15, bed in low position, call light within reach, on room air, attached to cardiac monitor, unlabored breathing noted, no cough noted, interacts with staff, interactions noted as appropriate, covers eye due to light sensitivity,  at bedside, will continue to assess patient.

## 2019-08-31 NOTE — ED NOTES
Patient returned from CT scan at this time.    Patient awake alert and oriented x 4, Glascow 15, bed in low position, call light within reach, on room air, attached to cardiac monitor, unlabored breathing noted, no cough noted, interacts with staff, interactions noted as appropriate,  at bedside.

## 2019-08-31 NOTE — ED NOTES
Patient awake alert and oriented x 4, Glascow 15, bed in low position, call light within reach, on room air, attached to cardiac monitor, unlabored breathing noted, no cough noted, interacts with staff, interactions noted as appropriate,  at bedside, complains of head pain after ketamine.

## 2019-08-31 NOTE — ED NOTES
Patient resting in bed, sleeping, no signs of pain or distress, unlabored breathing noted, attached to cardiac monitor, bed in low position, call light within reach,  at bedside, will continue to assess patient.

## 2019-08-31 NOTE — ED NOTES
Patient awake alert and oriented x 4, Glascow 15, bed in low position, call light within reach, on room air, attached to cardiac monitor, unlabored breathing noted, no cough noted, interacts with staff, interactions noted as appropriate, prefers eyes covered due to light sensitivity.

## 2019-08-31 NOTE — ED NOTES
Patient awake alert and oriented x 4, Glascow 15, bed in low position, call light within reach, on room air, attached to cardiac monitor, unlabored breathing noted, no cough noted, interacts with staff, interactions noted as appropriate,  at bedside.

## 2019-08-31 NOTE — ED NOTES
Report given.  Pt was medicated per mar.  Pt was able to ambulate to rest room under her own power.

## 2019-08-31 NOTE — ED PROVIDER NOTES
ED Provider Addendum    0400 -patient signed out to me by my colleague, Dr. Saldana, for reevaluation and final disposition.  Please refer to his note for complete details.  In short, patient presents with acute exacerbation of her chronic headaches, migraines.  History of pseudotumor cerebra with a shunt in place.  CT head was within normal limits today.  Patient had multiple previous evaluations in the ED for pain control.  She is also followed by neurology.  Waiting for new spinal stimulator.  Patient has received headache cocktail, Dilaudid and is now getting ketamine for persistent pain.    0645 -patient reevaluated at bedside.  Ketamine infusion complete.  However patient denies much improvement.  Patient states she came to the emergency department with a 10 out of 10 headache, now 8 or 9 out of 10.  Headache presentation, location, severity is not unusual for her, she has required multiple ED evaluations previously for pain control.  Pain control not sufficient enough for discharge at this point.  Will try additional dose of Dilaudid, add Decadron.    8:16 AM patient reevaluated at bedside.  No improvement with the additional Dilaudid.  She is now received headache cocktail, 2 mg of Dilaudid as well as ketamine.  Therefore she will be admitted to the hospitalist for further evaluation and pain control.  Otherwise remains neurologically intact and nonfocal.  Vital signs stable without fever tachycardia.  Blood pressures well controlled.  Patient is aware of the recommendations and agreeable to the disposition plan.    8:33 AM Dr. Abdullahi is where the patient agreeable to admission.

## 2019-08-31 NOTE — PROGRESS NOTES
VSS.  in place. A+O x 4. Reporting moderate to severe headache. Pt pain goal is 5/10. Pain managed with PRN medication and ice pack. Tolerating regular diet. Up contact guard. Pt oriented to room and plan of care. Bed alarm on, call bell within reach.

## 2019-08-31 NOTE — ED PROVIDER NOTES
ED Provider Note    CHIEF COMPLAINT  Chief Complaint   Patient presents with   • Migraine     came on quickly and intense per pt       HPI  Enedelia Pang is a 49 y.o. female who presents with headache.  Patient with a history of pseudotumor cerebri with shunt in place.  She has been seen in the emergency department multiple times for headaches and migraines.  She reports this is mildly different as this headache began without an aura.  The triage note reports it started suddenly patient reports is relatively gradual on has progressively worsened (patient reports headache began at 1 PM and reached maximal intensity at 5 PM), patient denies any associated neck pain or neck stiffness.  She denies any new weakness or numbness.  She denies any fevers or constitutional symptoms.  She reports the quality of the pain is identical to prior migraines.  Patient denies any recent head trauma.    REVIEW OF SYSTEMS  ROS  See HPI for further details. All other systems are negative.     PAST MEDICAL HISTORY   has a past medical history of Allergy, unspecified not elsewhere classified, Anemia (10/05/2017), Anxiety, autoimmune, Depression, H/O Clostridium difficile infection, MRSA infection, Hydrocephalus, Migraine with aura, with intractable migraine, so stated, without mention of status migrainosus, MRSA (methicillin resistant Staphylococcus aureus), Pituitary abnormality (HCC), Staph infection, and Stroke ([facility]).    SOCIAL HISTORY  Social History     Tobacco Use   • Smoking status: Never Smoker   • Smokeless tobacco: Never Used   Substance and Sexual Activity   • Alcohol use: Yes     Comment: Rare   • Drug use: No   • Sexual activity: Not on file       SURGICAL HISTORY   has a past surgical history that includes cholecystectomy; tonsillectomy; appendectomy; tubal ligation; other; other neurological surg; irrigation & debridement neuro (N/A, 6/28/2015); lumbar exploration (N/A, 8/31/2015); spinal cord stimulator (12/19/2016); and  " shunt insertion (5/31/2017).    CURRENT MEDICATIONS  Home Medications    **Home medications have not yet been reviewed for this encounter**         ALLERGIES  Allergies   Allergen Reactions   • Sumatriptan Unspecified     Historical=\"Heart stops.\" Reaction  between 1995 to 1997   • Prochlorperazine Swelling     Tongue swelling. Reaction as a teen. (compazine)  Tolerated Phenergan on 02/24/15   • Vancomycin Shortness of Breath and Rash     Reaction in 2005.  D/W patient 8/31/15 - tolerated loading dose of vancomycin administered on 8/30/15 with some itching to chest with decreased infusion rate. Red Man Syndrome       PHYSICAL EXAM  Physical Exam   Constitutional: She is oriented to person, place, and time. She appears well-developed and well-nourished.   HENT:   Head: Normocephalic and atraumatic.   Eyes: Conjunctivae are normal.   Neck: Normal range of motion. Neck supple.   Cardiovascular: Normal rate and regular rhythm.   Pulmonary/Chest: Effort normal and breath sounds normal.   Abdominal: Soft. Bowel sounds are normal. She exhibits no distension. There is no tenderness. There is no rebound.   Neurological: She is alert and oriented to person, place, and time.   Distal and proximal strength 5/5 in upper and lower extremities. Normal gait. No dysmetria. No sensation deficits. No visual field deficits. Cranial nerves intact.      Skin: Skin is warm and dry. No rash noted.   Psychiatric: She has a normal mood and affect. Her behavior is normal.         COURSE & MEDICAL DECISION MAKING  Pertinent Labs & Imaging studies reviewed. (See chart for details)  Patient here with migraine.  She is been seen multiple times in the emergency department for migraines.  Given her associated shunt she will need a CT for evidence of hydrocephalus.  Patient without any fevers constitutional symptoms to suggest infection of the shunt.  Patient is very well-appearing.  Subarachnoid is highly unlikely in this patient with a history " of migraines.  The slow progression of the headache is inconsistent with subarachnoid hemorrhage.  She has no neck pain or neck stiffness on exam.  She has a normal neurologic exam.  Patient will be given Benadryl Toradol and Reglan.  Patient given IV fluids for treatment of her headache, she reports this is very effective for treatment of her headache.  Reglan Benadryl and Toradol failed to resolve patient's headache.  Her CT is reassuring without any hydrocephalus, shunt malfunction highly unlikely.  Patient's neurologic exam is remained unremarkable.  We have given Dilaudid given patient's ongoing headache.  Following Dilaudid patient is feeling improved but symptoms persist. Pt requesting ketamine. Will give  Patient will be signed out to my partners for follow-up of pain.      FINAL IMPRESSION  1.  Migraine, shunt      Electronically signed by: Dillan Saldana, 8/31/2019 1:18 AM

## 2019-08-31 NOTE — ASSESSMENT & PLAN NOTE
Upon my evaluation, 09/03/19 patient is extremely sedated on ketamine drip.  Per nursing staff, she has not done much today, she is somnolent, not interacting at all.  In my medical opinion, she is imminent threat of respiratory depression, CNS depression and at risk of severe adverse events including aspiration, falls and other comorbidities including risk of mechanical ventilation and death.    Despite above, she continues to ask for pain medications though she is very comfortable at bedside upon my evaluation, sedated and euphoric.  To me, this is highly consistent with substance/narcotic/drug-seeking behavior.    I discussed with the patient, spouse/significant other at bedside  At this time strict aspiration, fall precautions to continue, n.p.o. pending improvement in mental status  I informed them that I will be stopping all sedating medications  I informed them that I would not be prescribing any further orders of the sedating medications  I informed them that I would not cause more harm if these medications are not helping the patient  As needed Toradol to remain available    I discussed with the patient, my intention is her safety.  It is my professional medical opinion at this time that ongoing sedative medications but only further home this patient.  This has been a repetitive issue for the patient, she may have an underlying pathology but I do not believe that at this time she is in true pain given my evaluation today.  I have requested consultation from Dr. Melissa Bloch from neurology who will provide formal input.  She agrees with me at this time, with holding all medications and monitoring.    I have discussed with pharmacy, per patient she received a prescription for IM Benadryl, pharmacy staff to verify with [facility] pharmacy and if it indeed is the case patient will be given a piggyback infused over 2 hours of Benadryl 50 mg as opposed to Benadryl pushes.    I have personally discussed the case  with chart nursing, patient's nursing staff    Stop steroids, Flexeril, holding at home oxycodone can resume once more awake  Can continue gabapentin for now

## 2019-08-31 NOTE — CARE PLAN
Problem: Infection  Goal: Will remain free from infection  Outcome: PROGRESSING AS EXPECTED  Intervention: Assess signs and symptoms of infection  Note:   No signs/symptoms of infx. Standard precautions implemented.      Problem: Pain Management  Goal: Pain level will decrease to patient's comfort goal  Outcome: PROGRESSING SLOWER THAN EXPECTED  Intervention: Educate and implement non-pharmacologic comfort measures. Examples: relaxation, distration, play therapy, activity therapy, massage, etc.  Flowsheets (Taken 8/31/2019 0716)  Intervention: Medication (see MAR); [place]; Cold Pack; Rest; Distraction; Emotional Support

## 2019-08-31 NOTE — PROGRESS NOTES
2 RN Skin Assessment completed with CAROLINA Velazquez.    LLE: pink, red, blanchable area on anterior foot.     Remainder of skin assessment unremarkable.     Interventions: moisturizing cream, frequent repositioning encouraged, ambulation encouraged, adequate PO intake.

## 2019-09-01 PROBLEM — R07.9 CHEST PAIN: Status: RESOLVED | Noted: 2017-04-29 | Resolved: 2019-09-01

## 2019-09-01 PROBLEM — L98.9 SKIN LESIONS: Status: RESOLVED | Noted: 2019-01-18 | Resolved: 2019-09-01

## 2019-09-01 PROBLEM — M25.449 SWELLING OF JOINT OF HAND: Status: RESOLVED | Noted: 2017-01-06 | Resolved: 2019-09-01

## 2019-09-01 PROBLEM — R19.7 DIARRHEA: Status: RESOLVED | Noted: 2017-05-21 | Resolved: 2019-09-01

## 2019-09-01 PROBLEM — R11.2 NAUSEA AND VOMITING: Status: RESOLVED | Noted: 2017-05-26 | Resolved: 2019-09-01

## 2019-09-01 PROBLEM — R68.89 SPELLS OF DECREASED ATTENTIVENESS: Status: RESOLVED | Noted: 2018-02-22 | Resolved: 2019-09-01

## 2019-09-01 PROBLEM — E87.20 METABOLIC ACIDOSIS: Status: RESOLVED | Noted: 2017-05-01 | Resolved: 2019-09-01

## 2019-09-01 PROBLEM — F11.93 OPIATE WITHDRAWAL (HCC): Status: RESOLVED | Noted: 2017-11-19 | Resolved: 2019-09-01

## 2019-09-01 PROBLEM — R78.81 POSITIVE BLOOD CULTURES: Status: RESOLVED | Noted: 2017-05-26 | Resolved: 2019-09-01

## 2019-09-01 PROBLEM — R45.851 SUICIDAL IDEATION: Status: RESOLVED | Noted: 2017-04-30 | Resolved: 2019-09-01

## 2019-09-01 PROBLEM — R52 WHOLE BODY PAIN: Status: RESOLVED | Noted: 2017-05-22 | Resolved: 2019-09-01

## 2019-09-01 PROBLEM — E56.9 VITAMIN DEFICIENCY: Status: RESOLVED | Noted: 2018-02-22 | Resolved: 2019-09-01

## 2019-09-01 PROBLEM — R09.81 NASAL CONGESTION: Status: RESOLVED | Noted: 2017-05-22 | Resolved: 2019-09-01

## 2019-09-01 PROBLEM — R79.89 LFT ELEVATION: Status: RESOLVED | Noted: 2017-05-21 | Resolved: 2019-09-01

## 2019-09-01 PROBLEM — I99.9 VASCULOPATHY: Status: RESOLVED | Noted: 2018-01-25 | Resolved: 2019-09-01

## 2019-09-01 PROBLEM — L03.90 CELLULITIS: Status: RESOLVED | Noted: 2018-06-14 | Resolved: 2019-09-01

## 2019-09-01 PROBLEM — R42 SPELL OF DIZZINESS: Status: RESOLVED | Noted: 2018-06-08 | Resolved: 2019-09-01

## 2019-09-01 PROBLEM — Z87.2 HISTORY OF SKIN ULCER: Status: RESOLVED | Noted: 2017-10-15 | Resolved: 2019-09-01

## 2019-09-01 PROBLEM — R00.0 TACHYCARDIA: Status: RESOLVED | Noted: 2018-05-28 | Resolved: 2019-09-01

## 2019-09-01 PROBLEM — G93.2 BENIGN INTRACRANIAL HYPERTENSION: Status: RESOLVED | Noted: 2017-06-14 | Resolved: 2019-09-01

## 2019-09-01 PROCEDURE — 700111 HCHG RX REV CODE 636 W/ 250 OVERRIDE (IP): Performed by: HOSPITALIST

## 2019-09-01 PROCEDURE — 99226 PR SUBSEQUENT OBSERVATION CARE,LEVEL III: CPT | Performed by: INTERNAL MEDICINE

## 2019-09-01 PROCEDURE — 700102 HCHG RX REV CODE 250 W/ 637 OVERRIDE(OP): Performed by: INTERNAL MEDICINE

## 2019-09-01 PROCEDURE — 700111 HCHG RX REV CODE 636 W/ 250 OVERRIDE (IP): Performed by: INTERNAL MEDICINE

## 2019-09-01 PROCEDURE — A9270 NON-COVERED ITEM OR SERVICE: HCPCS | Performed by: HOSPITALIST

## 2019-09-01 PROCEDURE — A9270 NON-COVERED ITEM OR SERVICE: HCPCS | Performed by: INTERNAL MEDICINE

## 2019-09-01 PROCEDURE — 700102 HCHG RX REV CODE 250 W/ 637 OVERRIDE(OP): Performed by: HOSPITALIST

## 2019-09-01 PROCEDURE — 700105 HCHG RX REV CODE 258: Performed by: INTERNAL MEDICINE

## 2019-09-01 PROCEDURE — G0378 HOSPITAL OBSERVATION PER HR: HCPCS

## 2019-09-01 RX ORDER — DIHYDROERGOTAMINE MESYLATE 1 MG/ML
1 INJECTION, SOLUTION INTRAMUSCULAR; INTRAVENOUS; SUBCUTANEOUS ONCE
Status: DISCONTINUED | OUTPATIENT
Start: 2019-09-01 | End: 2019-09-01

## 2019-09-01 RX ORDER — DIHYDROERGOTAMINE MESYLATE 1 MG/ML
1 INJECTION, SOLUTION INTRAMUSCULAR; INTRAVENOUS; SUBCUTANEOUS EVERY 8 HOURS
Status: DISCONTINUED | OUTPATIENT
Start: 2019-09-01 | End: 2019-09-02

## 2019-09-01 RX ORDER — SODIUM CHLORIDE 9 MG/ML
INJECTION, SOLUTION INTRAVENOUS CONTINUOUS
Status: DISCONTINUED | OUTPATIENT
Start: 2019-09-01 | End: 2019-09-03

## 2019-09-01 RX ORDER — CYCLOBENZAPRINE HCL 10 MG
5 TABLET ORAL 2 TIMES DAILY
Status: DISCONTINUED | OUTPATIENT
Start: 2019-09-01 | End: 2019-09-03

## 2019-09-01 RX ORDER — HYDROMORPHONE HYDROCHLORIDE 1 MG/ML
1 INJECTION, SOLUTION INTRAMUSCULAR; INTRAVENOUS; SUBCUTANEOUS
Status: DISCONTINUED | OUTPATIENT
Start: 2019-09-01 | End: 2019-09-02

## 2019-09-01 RX ORDER — HYDROMORPHONE HYDROCHLORIDE 1 MG/ML
1 INJECTION, SOLUTION INTRAMUSCULAR; INTRAVENOUS; SUBCUTANEOUS EVERY 4 HOURS PRN
Status: DISCONTINUED | OUTPATIENT
Start: 2019-09-01 | End: 2019-09-01

## 2019-09-01 RX ORDER — DEXAMETHASONE 1 MG
2 TABLET ORAL 2 TIMES DAILY
Status: DISCONTINUED | OUTPATIENT
Start: 2019-09-01 | End: 2019-09-03

## 2019-09-01 RX ADMIN — OXYCODONE HYDROCHLORIDE 10 MG: 10 TABLET ORAL at 20:09

## 2019-09-01 RX ADMIN — HYDROMORPHONE HYDROCHLORIDE 1 MG: 1 INJECTION, SOLUTION INTRAMUSCULAR; INTRAVENOUS; SUBCUTANEOUS at 10:46

## 2019-09-01 RX ADMIN — RISPERIDONE 2 MG: 2 TABLET ORAL at 09:12

## 2019-09-01 RX ADMIN — DIHYDROERGOTAMINE MESYLATE 1 MG: 1 INJECTION, SOLUTION INTRAMUSCULAR; INTRAVENOUS; SUBCUTANEOUS at 22:06

## 2019-09-01 RX ADMIN — KETOROLAC TROMETHAMINE 15 MG: 30 INJECTION, SOLUTION INTRAMUSCULAR; INTRAVENOUS at 18:07

## 2019-09-01 RX ADMIN — HYDROMORPHONE HYDROCHLORIDE 1 MG: 1 INJECTION, SOLUTION INTRAMUSCULAR; INTRAVENOUS; SUBCUTANEOUS at 22:05

## 2019-09-01 RX ADMIN — ASPIRIN 81 MG 81 MG: 81 TABLET ORAL at 05:00

## 2019-09-01 RX ADMIN — KETOROLAC TROMETHAMINE 15 MG: 30 INJECTION, SOLUTION INTRAMUSCULAR; INTRAVENOUS at 05:00

## 2019-09-01 RX ADMIN — DIHYDROERGOTAMINE MESYLATE 1 MG: 1 INJECTION, SOLUTION INTRAMUSCULAR; INTRAVENOUS; SUBCUTANEOUS at 15:36

## 2019-09-01 RX ADMIN — POTASSIUM CHLORIDE 20 MEQ: 20 TABLET, EXTENDED RELEASE ORAL at 05:00

## 2019-09-01 RX ADMIN — GABAPENTIN 600 MG: 300 CAPSULE ORAL at 11:41

## 2019-09-01 RX ADMIN — DULOXETINE HYDROCHLORIDE 60 MG: 60 CAPSULE, DELAYED RELEASE ORAL at 20:08

## 2019-09-01 RX ADMIN — DEXAMETHASONE 2 MG: 1 TABLET ORAL at 15:36

## 2019-09-01 RX ADMIN — HYDROMORPHONE HYDROCHLORIDE 1 MG: 1 INJECTION, SOLUTION INTRAMUSCULAR; INTRAVENOUS; SUBCUTANEOUS at 18:45

## 2019-09-01 RX ADMIN — KETOROLAC TROMETHAMINE 15 MG: 30 INJECTION, SOLUTION INTRAMUSCULAR; INTRAVENOUS at 11:41

## 2019-09-01 RX ADMIN — CYCLOBENZAPRINE HYDROCHLORIDE 5 MG: 10 TABLET, FILM COATED ORAL at 17:07

## 2019-09-01 RX ADMIN — DIVALPROEX SODIUM 500 MG: 500 TABLET, EXTENDED RELEASE ORAL at 17:07

## 2019-09-01 RX ADMIN — HYDROMORPHONE HYDROCHLORIDE 1 MG: 1 INJECTION, SOLUTION INTRAMUSCULAR; INTRAVENOUS; SUBCUTANEOUS at 14:52

## 2019-09-01 RX ADMIN — GABAPENTIN 600 MG: 300 CAPSULE ORAL at 17:07

## 2019-09-01 RX ADMIN — DIPHENHYDRAMINE HYDROCHLORIDE 50 MG: 50 INJECTION, SOLUTION INTRAMUSCULAR; INTRAVENOUS at 11:41

## 2019-09-01 RX ADMIN — POTASSIUM CHLORIDE 20 MEQ: 20 TABLET, EXTENDED RELEASE ORAL at 17:08

## 2019-09-01 RX ADMIN — HYDROMORPHONE HYDROCHLORIDE 1 MG: 1 INJECTION, SOLUTION INTRAMUSCULAR; INTRAVENOUS; SUBCUTANEOUS at 07:27

## 2019-09-01 RX ADMIN — DIVALPROEX SODIUM 500 MG: 500 TABLET, EXTENDED RELEASE ORAL at 05:01

## 2019-09-01 RX ADMIN — LEVETIRACETAM 1500 MG: 500 TABLET, FILM COATED ORAL at 05:00

## 2019-09-01 RX ADMIN — DIPHENHYDRAMINE HYDROCHLORIDE 50 MG: 50 INJECTION, SOLUTION INTRAMUSCULAR; INTRAVENOUS at 18:07

## 2019-09-01 RX ADMIN — GABAPENTIN 600 MG: 300 CAPSULE ORAL at 05:00

## 2019-09-01 RX ADMIN — DIPHENHYDRAMINE HYDROCHLORIDE 50 MG: 50 INJECTION, SOLUTION INTRAMUSCULAR; INTRAVENOUS at 05:00

## 2019-09-01 RX ADMIN — SODIUM CHLORIDE: 9 INJECTION, SOLUTION INTRAVENOUS at 14:15

## 2019-09-01 RX ADMIN — LEVETIRACETAM 1500 MG: 500 TABLET, FILM COATED ORAL at 17:07

## 2019-09-01 RX ADMIN — RISPERIDONE 2 MG: 2 TABLET ORAL at 20:08

## 2019-09-01 RX ADMIN — HYDROMORPHONE HYDROCHLORIDE 1 MG: 1 INJECTION, SOLUTION INTRAMUSCULAR; INTRAVENOUS; SUBCUTANEOUS at 02:52

## 2019-09-01 RX ADMIN — OXYCODONE HYDROCHLORIDE 10 MG: 10 TABLET ORAL at 14:56

## 2019-09-01 RX ADMIN — DULOXETINE HYDROCHLORIDE 60 MG: 60 CAPSULE, DELAYED RELEASE ORAL at 09:12

## 2019-09-01 ASSESSMENT — ENCOUNTER SYMPTOMS
VOMITING: 0
PALPITATIONS: 0
MYALGIAS: 0
DIARRHEA: 0
FEVER: 0
DEPRESSION: 0
SPEECH CHANGE: 0
PHOTOPHOBIA: 0
DIZZINESS: 0
TINGLING: 0
COUGH: 0
HEADACHES: 1
NAUSEA: 0
BLURRED VISION: 1
ABDOMINAL PAIN: 0
HEARTBURN: 0
SENSORY CHANGE: 0
TREMORS: 0
DOUBLE VISION: 0
SHORTNESS OF BREATH: 0
CHILLS: 0

## 2019-09-01 ASSESSMENT — LIFESTYLE VARIABLES: SUBSTANCE_ABUSE: 1

## 2019-09-01 NOTE — CARE PLAN
Problem: Knowledge Deficit  Goal: Knowledge of the prescribed therapeutic regimen will improve  Outcome: PROGRESSING AS EXPECTED   Reviewing plan of care, activities, and medication with patient.  Encouraging patient to ask questions and participate in plan of care.  Providing answers to all questions.  Continuing with current plan of care.  Hourly rounding in practice.   Problem: Pain Management  Goal: Pain level will decrease to patient's comfort goal  Outcome: PROGRESSING SLOWER THAN EXPECTED   Assessing pain level frequently using 0 to 10 scale.  Providing medication per MAR.  Providing non-pharmacological intervention and therapeutic communication.

## 2019-09-01 NOTE — PROGRESS NOTES
Hospital Medicine Daily Progress Note    Date of Service  9/1/2019    Chief Complaint  49 y.o. female admitted 8/31/2019 with headache     Hospital Course    49-year-old female past medical history of pseudotumor cerebri status post  shunt stent in place, rheumatoid arthritis with seronegative markers, complex migraine resistant to multiple treatment, seizure/pseudoseizures, chronic narcotics due to headache, complicated infection of pain stimulator placement presented with intractable migraine headache.  Ketamine injections and Dilaudid IV was given in emergency room for patient continues to have headache.  She was admitted for further management of headache with IV pain management and close monitoring for side effects as respiratory distress, constipation, aspiration, sudden death. Etc. Head CT negative.        Interval Problem Update  Slight better, but still persistent with aura. No weakness. She is currently on benadryl IV/toradol, gabapentin, depakote, dilaudid. Added flexeril. Continue to monitor     Consultants/Specialty  None     Code Status  Full code     Disposition  Observation     Review of Systems  Review of Systems   Constitutional: Negative for chills, fever and malaise/fatigue.   HENT: Negative for congestion, hearing loss and tinnitus.    Eyes: Positive for blurred vision. Negative for double vision and photophobia.        + aura    Respiratory: Negative for cough and shortness of breath.    Cardiovascular: Negative for chest pain, palpitations and leg swelling.   Gastrointestinal: Negative for abdominal pain, diarrhea, heartburn, nausea and vomiting.   Genitourinary: Negative for dysuria, frequency and urgency.   Musculoskeletal: Positive for joint pain. Negative for myalgias.   Neurological: Positive for headaches. Negative for dizziness, tingling, tremors, sensory change and speech change.   Psychiatric/Behavioral: Positive for substance abuse. Negative for depression.        Physical  Exam  Temp:  [36.2 °C (97.1 °F)-36.9 °C (98.4 °F)] 36.6 °C (97.9 °F)  Pulse:  [] 99  Resp:  [16-18] 18  BP: ()/(56-78) 112/56  SpO2:  [93 %-98 %] 98 %    Physical Exam   Constitutional: She is oriented to person, place, and time. She appears well-developed and well-nourished. No distress.   HENT:   Head: Normocephalic and atraumatic.   Eyes: Pupils are equal, round, and reactive to light. Right eye exhibits no discharge. Left eye exhibits no discharge.   Neck: Normal range of motion. No tracheal deviation present. No thyromegaly present.   Cardiovascular: Normal rate, regular rhythm and normal heart sounds.   No murmur heard.  Pulmonary/Chest: Effort normal and breath sounds normal. No respiratory distress. She has no wheezes.   Abdominal: Soft. Bowel sounds are normal. She exhibits no distension. There is no tenderness.   Musculoskeletal: Normal range of motion. She exhibits no edema.   Neurological: She is alert and oriented to person, place, and time. No cranial nerve deficit.   Skin: Skin is warm.   Psychiatric: She has a normal mood and affect. Her behavior is normal.   Nursing note and vitals reviewed.      Fluids    Intake/Output Summary (Last 24 hours) at 9/1/2019 1337  Last data filed at 8/31/2019 2200  Gross per 24 hour   Intake 400 ml   Output --   Net 400 ml       Laboratory                        Imaging  CT-HEAD W/O   Final Result         1.  No acute intracranial abnormality.   2.  Ventriculoperitoneal shunt, bilateral ventricles appear stable in size compared to prior study.           Assessment/Plan  Substance dependence (HCC)- (present on admission)  Assessment & Plan  There is some rebound headache component too   At this time continue to titrate medications . Still not well controled headache     Seizures (HCC)- (present on admission)  Assessment & Plan  Continue depakote and keppra     Headache and focal deficits, likely secondary to Complicated migraine- (present on  admission)  Assessment & Plan  Complex features headache   + aura, worse with light. Get better with IVF   It gets better with oxygen   Prolong history of pain meds/ component for rebound headache   Continue IVF/ add flexeril. Continue dilaudid . Avoid only for severe headache   Continue toradol/ benadryl   Gabapentin 600 mg TID     Depression- (present on admission)  Assessment & Plan  Continue cymbalta   Hold on buspar. High risk for serotonin syndrome     Anxiety- (present on admission)  Assessment & Plan  Stable at this time     S/P  shunt- (present on admission)  Assessment & Plan  CT scan showed no abnormalities   Follow up with neurosurgery as OP     Hyperlipidemia- (present on admission)  Assessment & Plan  Low-fat low-cholesterol diet to be continued.  Statin  Lab Results   Component Value Date/Time    CHOLSTRLTOT 150 05/22/2017 03:31 AM    LDL 86 05/22/2017 03:31 AM    HDL 52 05/22/2017 03:31 AM    TRIGLYCERIDE 61 05/22/2017 03:31 AM            Rheumatoid arthritis(714.0)- (present on admission)  Assessment & Plan  Continue pain management   Follow up as OP with rheumatologist        VTE prophylaxis: SCD

## 2019-09-01 NOTE — PROGRESS NOTES
Patient resting in bed. Patient is A&Ox4. Patient c/o head, medication given per MAR. Regular diet. Up to bathroom with SBA. Bed alarm on. POC discussed with patient. Hourly rounding.

## 2019-09-01 NOTE — CARE PLAN
Problem: Safety  Goal: Will remain free from injury  Outcome: PROGRESSING AS EXPECTED  Note:   Up to bathroom with SBA, safety education provided.

## 2019-09-02 PROBLEM — Z76.5 DRUG-SEEKING BEHAVIOR: Status: ACTIVE | Noted: 2019-09-02

## 2019-09-02 LAB
ALBUMIN SERPL BCP-MCNC: 3.1 G/DL (ref 3.2–4.9)
ALBUMIN/GLOB SERPL: 1.5 G/DL
ALP SERPL-CCNC: 55 U/L (ref 30–99)
ALT SERPL-CCNC: 17 U/L (ref 2–50)
ANION GAP SERPL CALC-SCNC: 6 MMOL/L (ref 0–11.9)
AST SERPL-CCNC: 13 U/L (ref 12–45)
BASOPHILS # BLD AUTO: 0.4 % (ref 0–1.8)
BASOPHILS # BLD: 0.03 K/UL (ref 0–0.12)
BILIRUB SERPL-MCNC: 0.2 MG/DL (ref 0.1–1.5)
BUN SERPL-MCNC: 3 MG/DL (ref 8–22)
CALCIUM SERPL-MCNC: 8.3 MG/DL (ref 8.5–10.5)
CHLORIDE SERPL-SCNC: 101 MMOL/L (ref 96–112)
CO2 SERPL-SCNC: 29 MMOL/L (ref 20–33)
CREAT SERPL-MCNC: 0.33 MG/DL (ref 0.5–1.4)
EOSINOPHIL # BLD AUTO: 0.03 K/UL (ref 0–0.51)
EOSINOPHIL NFR BLD: 0.4 % (ref 0–6.9)
ERYTHROCYTE [DISTWIDTH] IN BLOOD BY AUTOMATED COUNT: 46.2 FL (ref 35.9–50)
GLOBULIN SER CALC-MCNC: 2.1 G/DL (ref 1.9–3.5)
GLUCOSE SERPL-MCNC: 106 MG/DL (ref 65–99)
HCT VFR BLD AUTO: 34.6 % (ref 37–47)
HGB BLD-MCNC: 11.1 G/DL (ref 12–16)
IMM GRANULOCYTES # BLD AUTO: 0.03 K/UL (ref 0–0.11)
IMM GRANULOCYTES NFR BLD AUTO: 0.4 % (ref 0–0.9)
LYMPHOCYTES # BLD AUTO: 1.57 K/UL (ref 1–4.8)
LYMPHOCYTES NFR BLD: 18.4 % (ref 22–41)
MAGNESIUM SERPL-MCNC: 1.8 MG/DL (ref 1.5–2.5)
MCH RBC QN AUTO: 31.7 PG (ref 27–33)
MCHC RBC AUTO-ENTMCNC: 32.1 G/DL (ref 33.6–35)
MCV RBC AUTO: 98.9 FL (ref 81.4–97.8)
MONOCYTES # BLD AUTO: 0.83 K/UL (ref 0–0.85)
MONOCYTES NFR BLD AUTO: 9.8 % (ref 0–13.4)
NEUTROPHILS # BLD AUTO: 6.02 K/UL (ref 2–7.15)
NEUTROPHILS NFR BLD: 70.6 % (ref 44–72)
NRBC # BLD AUTO: 0 K/UL
NRBC BLD-RTO: 0 /100 WBC
PHOSPHATE SERPL-MCNC: 4.1 MG/DL (ref 2.5–4.5)
PLATELET # BLD AUTO: 267 K/UL (ref 164–446)
PMV BLD AUTO: 9.9 FL (ref 9–12.9)
POTASSIUM SERPL-SCNC: 4.6 MMOL/L (ref 3.6–5.5)
PROT SERPL-MCNC: 5.2 G/DL (ref 6–8.2)
RBC # BLD AUTO: 3.5 M/UL (ref 4.2–5.4)
SODIUM SERPL-SCNC: 136 MMOL/L (ref 135–145)
VALPROATE SERPL-MCNC: 38.7 UG/ML (ref 50–100)
WBC # BLD AUTO: 8.5 K/UL (ref 4.8–10.8)

## 2019-09-02 PROCEDURE — 700102 HCHG RX REV CODE 250 W/ 637 OVERRIDE(OP): Performed by: INTERNAL MEDICINE

## 2019-09-02 PROCEDURE — 700105 HCHG RX REV CODE 258: Performed by: INTERNAL MEDICINE

## 2019-09-02 PROCEDURE — 700111 HCHG RX REV CODE 636 W/ 250 OVERRIDE (IP): Performed by: HOSPITALIST

## 2019-09-02 PROCEDURE — 83735 ASSAY OF MAGNESIUM: CPT

## 2019-09-02 PROCEDURE — A9270 NON-COVERED ITEM OR SERVICE: HCPCS | Performed by: HOSPITALIST

## 2019-09-02 PROCEDURE — 80164 ASSAY DIPROPYLACETIC ACD TOT: CPT

## 2019-09-02 PROCEDURE — 97165 OT EVAL LOW COMPLEX 30 MIN: CPT

## 2019-09-02 PROCEDURE — 700101 HCHG RX REV CODE 250: Performed by: INTERNAL MEDICINE

## 2019-09-02 PROCEDURE — 700111 HCHG RX REV CODE 636 W/ 250 OVERRIDE (IP): Performed by: INTERNAL MEDICINE

## 2019-09-02 PROCEDURE — 700102 HCHG RX REV CODE 250 W/ 637 OVERRIDE(OP): Performed by: HOSPITALIST

## 2019-09-02 PROCEDURE — A9270 NON-COVERED ITEM OR SERVICE: HCPCS | Performed by: INTERNAL MEDICINE

## 2019-09-02 PROCEDURE — 80053 COMPREHEN METABOLIC PANEL: CPT

## 2019-09-02 PROCEDURE — 770006 HCHG ROOM/CARE - MED/SURG/GYN SEMI*

## 2019-09-02 PROCEDURE — 97161 PT EVAL LOW COMPLEX 20 MIN: CPT

## 2019-09-02 PROCEDURE — 85025 COMPLETE CBC W/AUTO DIFF WBC: CPT

## 2019-09-02 PROCEDURE — 84100 ASSAY OF PHOSPHORUS: CPT

## 2019-09-02 PROCEDURE — 99233 SBSQ HOSP IP/OBS HIGH 50: CPT | Performed by: INTERNAL MEDICINE

## 2019-09-02 RX ORDER — MAGNESIUM SULFATE HEPTAHYDRATE 40 MG/ML
2 INJECTION, SOLUTION INTRAVENOUS ONCE
Status: COMPLETED | OUTPATIENT
Start: 2019-09-02 | End: 2019-09-02

## 2019-09-02 RX ORDER — HYDROMORPHONE HYDROCHLORIDE 1 MG/ML
1 INJECTION, SOLUTION INTRAMUSCULAR; INTRAVENOUS; SUBCUTANEOUS
Status: DISCONTINUED | OUTPATIENT
Start: 2019-09-02 | End: 2019-09-03

## 2019-09-02 RX ADMIN — RISPERIDONE 2 MG: 2 TABLET ORAL at 08:24

## 2019-09-02 RX ADMIN — OXYCODONE HYDROCHLORIDE 10 MG: 10 TABLET ORAL at 13:28

## 2019-09-02 RX ADMIN — DIPHENHYDRAMINE HYDROCHLORIDE 50 MG: 50 INJECTION, SOLUTION INTRAMUSCULAR; INTRAVENOUS at 12:19

## 2019-09-02 RX ADMIN — OXYCODONE HYDROCHLORIDE 10 MG: 10 TABLET ORAL at 02:11

## 2019-09-02 RX ADMIN — KETOROLAC TROMETHAMINE 15 MG: 30 INJECTION, SOLUTION INTRAMUSCULAR; INTRAVENOUS at 06:11

## 2019-09-02 RX ADMIN — ACETAMINOPHEN 650 MG: 325 TABLET, FILM COATED ORAL at 03:30

## 2019-09-02 RX ADMIN — DEXAMETHASONE 2 MG: 1 TABLET ORAL at 16:53

## 2019-09-02 RX ADMIN — CYCLOBENZAPRINE HYDROCHLORIDE 5 MG: 10 TABLET, FILM COATED ORAL at 05:09

## 2019-09-02 RX ADMIN — SODIUM CHLORIDE: 9 INJECTION, SOLUTION INTRAVENOUS at 11:07

## 2019-09-02 RX ADMIN — OXYCODONE HYDROCHLORIDE 10 MG: 10 TABLET ORAL at 08:24

## 2019-09-02 RX ADMIN — KETOROLAC TROMETHAMINE 15 MG: 30 INJECTION, SOLUTION INTRAMUSCULAR; INTRAVENOUS at 00:11

## 2019-09-02 RX ADMIN — SENNOSIDES, DOCUSATE SODIUM 2 TABLET: 50; 8.6 TABLET, FILM COATED ORAL at 16:53

## 2019-09-02 RX ADMIN — RISPERIDONE 2 MG: 2 TABLET ORAL at 20:48

## 2019-09-02 RX ADMIN — DIPHENHYDRAMINE HYDROCHLORIDE 50 MG: 50 INJECTION, SOLUTION INTRAMUSCULAR; INTRAVENOUS at 06:11

## 2019-09-02 RX ADMIN — KETAMINE HYDROCHLORIDE 0.15 MG/KG/HR: 50 INJECTION, SOLUTION INTRAMUSCULAR; INTRAVENOUS at 16:38

## 2019-09-02 RX ADMIN — GABAPENTIN 600 MG: 300 CAPSULE ORAL at 16:53

## 2019-09-02 RX ADMIN — HYDROMORPHONE HYDROCHLORIDE 1 MG: 1 INJECTION, SOLUTION INTRAMUSCULAR; INTRAVENOUS; SUBCUTANEOUS at 08:41

## 2019-09-02 RX ADMIN — KETOROLAC TROMETHAMINE 15 MG: 30 INJECTION, SOLUTION INTRAMUSCULAR; INTRAVENOUS at 12:19

## 2019-09-02 RX ADMIN — SODIUM CHLORIDE: 9 INJECTION, SOLUTION INTRAVENOUS at 00:14

## 2019-09-02 RX ADMIN — DIVALPROEX SODIUM 500 MG: 500 TABLET, EXTENDED RELEASE ORAL at 05:10

## 2019-09-02 RX ADMIN — MAGNESIUM SULFATE 2 G: 2 INJECTION INTRAVENOUS at 11:07

## 2019-09-02 RX ADMIN — DIPHENHYDRAMINE HYDROCHLORIDE 50 MG: 50 INJECTION, SOLUTION INTRAMUSCULAR; INTRAVENOUS at 00:11

## 2019-09-02 RX ADMIN — DIHYDROERGOTAMINE MESYLATE 1 MG: 1 INJECTION, SOLUTION INTRAMUSCULAR; INTRAVENOUS; SUBCUTANEOUS at 05:10

## 2019-09-02 RX ADMIN — SODIUM CHLORIDE: 9 INJECTION, SOLUTION INTRAVENOUS at 23:43

## 2019-09-02 RX ADMIN — CYCLOBENZAPRINE HYDROCHLORIDE 5 MG: 10 TABLET, FILM COATED ORAL at 16:53

## 2019-09-02 RX ADMIN — GABAPENTIN 600 MG: 300 CAPSULE ORAL at 11:07

## 2019-09-02 RX ADMIN — DULOXETINE HYDROCHLORIDE 60 MG: 60 CAPSULE, DELAYED RELEASE ORAL at 20:48

## 2019-09-02 RX ADMIN — GABAPENTIN 600 MG: 300 CAPSULE ORAL at 05:09

## 2019-09-02 RX ADMIN — DIVALPROEX SODIUM 500 MG: 500 TABLET, EXTENDED RELEASE ORAL at 16:53

## 2019-09-02 RX ADMIN — HYDROMORPHONE HYDROCHLORIDE 1 MG: 1 INJECTION, SOLUTION INTRAMUSCULAR; INTRAVENOUS; SUBCUTANEOUS at 03:30

## 2019-09-02 RX ADMIN — DEXAMETHASONE 2 MG: 1 TABLET ORAL at 05:10

## 2019-09-02 RX ADMIN — LEVETIRACETAM 1500 MG: 500 TABLET, FILM COATED ORAL at 05:10

## 2019-09-02 RX ADMIN — ASPIRIN 81 MG 81 MG: 81 TABLET ORAL at 05:09

## 2019-09-02 RX ADMIN — DULOXETINE HYDROCHLORIDE 60 MG: 60 CAPSULE, DELAYED RELEASE ORAL at 08:24

## 2019-09-02 RX ADMIN — LEVETIRACETAM 1500 MG: 500 TABLET, FILM COATED ORAL at 16:53

## 2019-09-02 ASSESSMENT — GAIT ASSESSMENTS
DISTANCE (FEET): 40
GAIT LEVEL OF ASSIST: SUPERVISED

## 2019-09-02 ASSESSMENT — COGNITIVE AND FUNCTIONAL STATUS - GENERAL
SUGGESTED CMS G CODE MODIFIER DAILY ACTIVITY: CJ
TOILETING: A LITTLE
SUGGESTED CMS G CODE MODIFIER MOBILITY: CH
MOBILITY SCORE: 24
DRESSING REGULAR LOWER BODY CLOTHING: A LITTLE
DRESSING REGULAR UPPER BODY CLOTHING: A LITTLE
DAILY ACTIVITIY SCORE: 20
HELP NEEDED FOR BATHING: A LITTLE

## 2019-09-02 ASSESSMENT — ENCOUNTER SYMPTOMS
NAUSEA: 0
MYALGIAS: 0
DIZZINESS: 0
DOUBLE VISION: 0
TREMORS: 0
PHOTOPHOBIA: 0
SHORTNESS OF BREATH: 0
COUGH: 0
BLURRED VISION: 1
HEARTBURN: 0
DEPRESSION: 0
ABDOMINAL PAIN: 0
HEADACHES: 1
FEVER: 0
DIARRHEA: 0
PALPITATIONS: 0
CHILLS: 0
VOMITING: 0
SPEECH CHANGE: 0
TINGLING: 0
SENSORY CHANGE: 0

## 2019-09-02 ASSESSMENT — ACTIVITIES OF DAILY LIVING (ADL): TOILETING: INDEPENDENT

## 2019-09-02 ASSESSMENT — LIFESTYLE VARIABLES: SUBSTANCE_ABUSE: 1

## 2019-09-02 NOTE — DIETARY
Nutrition Services - Poor po reported on admission. Malnutrition Screening Tool score <2. No other risk for malnutrition, or other nutrition concerns,  identified on screening. Nutrition assessment not indicated at this time. RD will monitor per department guidelines.

## 2019-09-02 NOTE — PROGRESS NOTES
Hospital Medicine Daily Progress Note    Date of Service  9/2/2019    Chief Complaint  49 y.o. female admitted 8/31/2019 with headache     Hospital Course    49-year-old female past medical history of pseudotumor cerebri status post  shunt stent in place, rheumatoid arthritis with seronegative markers, complex migraine resistant to multiple treatment, seizure/pseudoseizures, chronic narcotics due to headache, complicated infection of pain stimulator placement presented with intractable migraine headache.  Ketamine injections and Dilaudid IV was given in emergency room for patient continues to have headache.  She was admitted for further management of headache with IV pain management and close monitoring for side effects as respiratory distress, constipation, aspiration, sudden death. Etc. Head CT negative.        Interval Problem Update  Slight better, but still persistent with aura. No weakness. She is currently on benadryl IV/toradol, gabapentin, depakote, dilaudid. Added flexeril. Continue to monitor   09/02-pain does not continue to improve besides multiple medication, trial. Pt asks only for dilaudid     Consultants/Specialty  None     Code Status  Full code     Disposition  Observation     Review of Systems  Review of Systems   Constitutional: Negative for chills, fever and malaise/fatigue.   HENT: Negative for congestion, hearing loss and tinnitus.    Eyes: Positive for blurred vision. Negative for double vision and photophobia.        + aura    Respiratory: Negative for cough and shortness of breath.    Cardiovascular: Negative for chest pain, palpitations and leg swelling.   Gastrointestinal: Negative for abdominal pain, diarrhea, heartburn, nausea and vomiting.   Genitourinary: Negative for dysuria, frequency and urgency.   Musculoskeletal: Positive for joint pain. Negative for myalgias.   Neurological: Positive for headaches. Negative for dizziness, tingling, tremors, sensory change and speech change.    Psychiatric/Behavioral: Positive for substance abuse. Negative for depression.        Physical Exam  Temp:  [36.2 °C (97.1 °F)-36.7 °C (98.1 °F)] 36.2 °C (97.1 °F)  Pulse:  [] 104  Resp:  [16-20] 18  BP: ()/(37-73) 105/55  SpO2:  [97 %-100 %] 97 %    Physical Exam   Constitutional: She is oriented to person, place, and time. She appears well-developed and well-nourished. No distress.   HENT:   Head: Normocephalic and atraumatic.   Eyes: Pupils are equal, round, and reactive to light. Right eye exhibits no discharge. Left eye exhibits no discharge.   Neck: Normal range of motion. No tracheal deviation present. No thyromegaly present.   Cardiovascular: Normal rate, regular rhythm and normal heart sounds.   No murmur heard.  Pulmonary/Chest: Effort normal and breath sounds normal. No respiratory distress. She has no wheezes.   Abdominal: Soft. Bowel sounds are normal. She exhibits no distension. There is no tenderness.   Musculoskeletal: Normal range of motion. She exhibits no edema.   Neurological: She is alert and oriented to person, place, and time. No cranial nerve deficit.   Skin: Skin is warm.   Psychiatric: She has a normal mood and affect. Her behavior is normal.   Nursing note and vitals reviewed.      Fluids    Intake/Output Summary (Last 24 hours) at 9/2/2019 1557  Last data filed at 9/2/2019 0600  Gross per 24 hour   Intake 1575 ml   Output --   Net 1575 ml       Laboratory  Recent Labs     09/02/19  0333   WBC 8.5   RBC 3.50*   HEMOGLOBIN 11.1*   HEMATOCRIT 34.6*   MCV 98.9*   MCH 31.7   MCHC 32.1*   RDW 46.2   PLATELETCT 267   MPV 9.9     Recent Labs     09/02/19  0333   SODIUM 136   POTASSIUM 4.6   CHLORIDE 101   CO2 29   GLUCOSE 106*   BUN 3*   CREATININE 0.33*   CALCIUM 8.3*                   Imaging  CT-HEAD W/O   Final Result         1.  No acute intracranial abnormality.   2.  Ventriculoperitoneal shunt, bilateral ventricles appear stable in size compared to prior study.            Assessment/Plan  Substance dependence (HCC)- (present on admission)  Assessment & Plan  There is some rebound headache component too   At this time continue to titrate medications . Still not well controled headache     Seizures (HCC)- (present on admission)  Assessment & Plan  Continue depakote and keppra     Headache and focal deficits, likely secondary to Complicated migraine- (present on admission)  Assessment & Plan  Complex features headache    Still persistent on her being on dilaudid 1 mg [id number] , oxycodone, benadryl, toradol, flexeril, zofran, depakote, oxygen, IVF, ketamine injection in ER   + aura, worse with light.   Prolong history of pain meds/ component for rebound headache   There is some drug seeking behaviors. Multiple times reports by RN pt states that pain 10/10 and eating same time   Discuss with neurology Dr. Jenkins and Dr. Bloch  Will try ketamine drip, low dose   Continue IVF/ add flexeril.   Continue dilaudid .  only for severe headache. I did discuss with RN close monitoring on mentation status. co2 increasing, she is high risk for intubation   Stop oxycodone   Continue toradol/ benadryl   Patient should not be admitted anymore on dilaudid for headache   Gabapentin 600 mg TID     Depression- (present on admission)  Assessment & Plan  Continue cymbalta   Hold on buspar. High risk for serotonin syndrome     Anxiety- (present on admission)  Assessment & Plan  Stable at this time     S/P  shunt- (present on admission)  Assessment & Plan  CT scan showed no abnormalities   Follow up with neurosurgery as OP     Drug-seeking behavior  Assessment & Plan  She is constantly asking for dilaudid. Would cry and scream, argue if discussed to be wean off dilaudid   Not proper behavioral.   Education provided   Neurology consulted   She should not be admitted for narcotic. She can be admitted for other modalities but not narcotic     Hyperlipidemia- (present on admission)  Assessment & Plan  Low-fat  low-cholesterol diet to be continued.  Statin  Lab Results   Component Value Date/Time    CHOLSTRLTOT 150 05/22/2017 03:31 AM    LDL 86 05/22/2017 03:31 AM    HDL 52 05/22/2017 03:31 AM    TRIGLYCERIDE 61 05/22/2017 03:31 AM            Rheumatoid arthritis(714.0)- (present on admission)  Assessment & Plan  Continue pain management   Follow up as OP with rheumatologist        VTE prophylaxis: SCD

## 2019-09-02 NOTE — CARE PLAN
Problem: Bowel/Gastric:  Goal: Normal bowel function is maintained or improved  Outcome: PROGRESSING AS EXPECTED  Intervention: Collaborate with Interdisciplinary Team for optimal positioning for bowel evacuation  Note:   Pt states loose BM during day shift.      Problem: Pain Management  Goal: Pain level will decrease to patient's comfort goal  Outcome: PROGRESSING SLOWER THAN EXPECTED  Intervention: Follow pain managment plan developed in collaboration with patient and Interdisciplinary Team  Note:   See MAR for orders

## 2019-09-02 NOTE — THERAPY
"Occupational Therapy Evaluation completed.   Functional Status:   Pt is a 48 y/o female admitted for severe headache, reported 7/10 pain. Pt has a prior  shunt, but reports the headaches have been very strong for a while. Pt is a chronic pain medication user for the headaches. Pt lives with  and son in a 2nd floor apartment. Pt has had 10 falls in the past 6 months per the patient. Pt was SPV OOB, able to dress self with no assist. Pt ambulated 150' with no device. Pt tolerated well. Recommended PT to see for 18 steps for home. No further acute OT needs.     Plan of Care: No further acute OT needs.   Discharge Recommendations:  Equipment: No Equipment Needed. Post-acute therapy Currently anticipate no further skilled therapy needs once patient is discharged from the inpatient setting.    See \"Rehab Therapy-Acute\" Patient Summary Report for complete documentation.    "

## 2019-09-02 NOTE — ASSESSMENT & PLAN NOTE
I have informed the patient and her  that no more narcotic/sedative medications will be prescribed given her condition today and high risk of harm to the patient  Nursing team aware of my recommendations

## 2019-09-02 NOTE — THERAPY
"Physical Therapy Evaluation completed.   Bed Mobility:  Supine to Sit: Modified Independent  Transfers: Sit to Stand: Modified Independent  Gait: Level Of Assist: Supervised with No Equipment Needed       Plan of Care: Patient with no further skilled PT needs in the acute care setting at this time  Discharge Recommendations: Equipment: No Equipment Needed. Post-acute therapy Anticipate that the patient will have no further physical therapy needs after discharge from the hospital.    Ms. Pang is a 48 y/o female who presents to acute secondary to  headache likely migraine and substance abuse. She presents with lower extremity strength that is equal bilaterally and WFL.  She was able to perform gait, transfers, and bed mobility without physical assist. Reports is at her baseline level of function, no additional acute PT needs. Recommend continue to ambulate with nursing staff.     See \"Rehab Therapy-Acute\" Patient Summary Report for complete documentation.     "

## 2019-09-03 LAB
ALBUMIN SERPL BCP-MCNC: 3.5 G/DL (ref 3.2–4.9)
ALBUMIN/GLOB SERPL: 1.5 G/DL
ALP SERPL-CCNC: 54 U/L (ref 30–99)
ALT SERPL-CCNC: 13 U/L (ref 2–50)
ANION GAP SERPL CALC-SCNC: 7 MMOL/L (ref 0–11.9)
AST SERPL-CCNC: 10 U/L (ref 12–45)
BASOPHILS # BLD AUTO: 0.3 % (ref 0–1.8)
BASOPHILS # BLD: 0.03 K/UL (ref 0–0.12)
BILIRUB SERPL-MCNC: 0.2 MG/DL (ref 0.1–1.5)
BUN SERPL-MCNC: 6 MG/DL (ref 8–22)
CALCIUM SERPL-MCNC: 8.7 MG/DL (ref 8.5–10.5)
CHLORIDE SERPL-SCNC: 102 MMOL/L (ref 96–112)
CO2 SERPL-SCNC: 29 MMOL/L (ref 20–33)
CREAT SERPL-MCNC: 0.53 MG/DL (ref 0.5–1.4)
EOSINOPHIL # BLD AUTO: 0.02 K/UL (ref 0–0.51)
EOSINOPHIL NFR BLD: 0.2 % (ref 0–6.9)
ERYTHROCYTE [DISTWIDTH] IN BLOOD BY AUTOMATED COUNT: 47 FL (ref 35.9–50)
GLOBULIN SER CALC-MCNC: 2.3 G/DL (ref 1.9–3.5)
GLUCOSE SERPL-MCNC: 117 MG/DL (ref 65–99)
HCT VFR BLD AUTO: 37.5 % (ref 37–47)
HGB BLD-MCNC: 12 G/DL (ref 12–16)
IMM GRANULOCYTES # BLD AUTO: 0.04 K/UL (ref 0–0.11)
IMM GRANULOCYTES NFR BLD AUTO: 0.4 % (ref 0–0.9)
LYMPHOCYTES # BLD AUTO: 1.36 K/UL (ref 1–4.8)
LYMPHOCYTES NFR BLD: 13.1 % (ref 22–41)
MAGNESIUM SERPL-MCNC: 1.9 MG/DL (ref 1.5–2.5)
MCH RBC QN AUTO: 31.8 PG (ref 27–33)
MCHC RBC AUTO-ENTMCNC: 32 G/DL (ref 33.6–35)
MCV RBC AUTO: 99.5 FL (ref 81.4–97.8)
MONOCYTES # BLD AUTO: 0.64 K/UL (ref 0–0.85)
MONOCYTES NFR BLD AUTO: 6.2 % (ref 0–13.4)
NEUTROPHILS # BLD AUTO: 8.27 K/UL (ref 2–7.15)
NEUTROPHILS NFR BLD: 79.8 % (ref 44–72)
NRBC # BLD AUTO: 0 K/UL
NRBC BLD-RTO: 0 /100 WBC
PHOSPHATE SERPL-MCNC: 2.9 MG/DL (ref 2.5–4.5)
PLATELET # BLD AUTO: 306 K/UL (ref 164–446)
PMV BLD AUTO: 9.9 FL (ref 9–12.9)
POTASSIUM SERPL-SCNC: 4.4 MMOL/L (ref 3.6–5.5)
PROT SERPL-MCNC: 5.8 G/DL (ref 6–8.2)
RBC # BLD AUTO: 3.77 M/UL (ref 4.2–5.4)
SODIUM SERPL-SCNC: 138 MMOL/L (ref 135–145)
WBC # BLD AUTO: 10.4 K/UL (ref 4.8–10.8)

## 2019-09-03 PROCEDURE — 770006 HCHG ROOM/CARE - MED/SURG/GYN SEMI*

## 2019-09-03 PROCEDURE — 83735 ASSAY OF MAGNESIUM: CPT

## 2019-09-03 PROCEDURE — 700102 HCHG RX REV CODE 250 W/ 637 OVERRIDE(OP): Performed by: HOSPITALIST

## 2019-09-03 PROCEDURE — A9270 NON-COVERED ITEM OR SERVICE: HCPCS | Performed by: INTERNAL MEDICINE

## 2019-09-03 PROCEDURE — 700111 HCHG RX REV CODE 636 W/ 250 OVERRIDE (IP): Performed by: HOSPITALIST

## 2019-09-03 PROCEDURE — 700105 HCHG RX REV CODE 258: Performed by: INTERNAL MEDICINE

## 2019-09-03 PROCEDURE — 36415 COLL VENOUS BLD VENIPUNCTURE: CPT

## 2019-09-03 PROCEDURE — 99223 1ST HOSP IP/OBS HIGH 75: CPT | Performed by: PSYCHIATRY & NEUROLOGY

## 2019-09-03 PROCEDURE — 700111 HCHG RX REV CODE 636 W/ 250 OVERRIDE (IP): Performed by: INTERNAL MEDICINE

## 2019-09-03 PROCEDURE — A9270 NON-COVERED ITEM OR SERVICE: HCPCS | Performed by: HOSPITALIST

## 2019-09-03 PROCEDURE — 99233 SBSQ HOSP IP/OBS HIGH 50: CPT | Performed by: INTERNAL MEDICINE

## 2019-09-03 PROCEDURE — 700102 HCHG RX REV CODE 250 W/ 637 OVERRIDE(OP): Performed by: INTERNAL MEDICINE

## 2019-09-03 PROCEDURE — 85025 COMPLETE CBC W/AUTO DIFF WBC: CPT

## 2019-09-03 PROCEDURE — 84100 ASSAY OF PHOSPHORUS: CPT

## 2019-09-03 PROCEDURE — 80053 COMPREHEN METABOLIC PANEL: CPT

## 2019-09-03 RX ORDER — BUSPIRONE HYDROCHLORIDE 10 MG/1
5 TABLET ORAL 2 TIMES DAILY
Status: DISCONTINUED | OUTPATIENT
Start: 2019-09-03 | End: 2019-09-03

## 2019-09-03 RX ADMIN — GABAPENTIN 600 MG: 300 CAPSULE ORAL at 05:11

## 2019-09-03 RX ADMIN — ASPIRIN 81 MG 81 MG: 81 TABLET ORAL at 05:11

## 2019-09-03 RX ADMIN — SENNOSIDES, DOCUSATE SODIUM 2 TABLET: 50; 8.6 TABLET, FILM COATED ORAL at 05:11

## 2019-09-03 RX ADMIN — DEXAMETHASONE 2 MG: 1 TABLET ORAL at 05:12

## 2019-09-03 RX ADMIN — GABAPENTIN 600 MG: 300 CAPSULE ORAL at 17:38

## 2019-09-03 RX ADMIN — DULOXETINE HYDROCHLORIDE 60 MG: 60 CAPSULE, DELAYED RELEASE ORAL at 20:22

## 2019-09-03 RX ADMIN — DULOXETINE HYDROCHLORIDE 60 MG: 60 CAPSULE, DELAYED RELEASE ORAL at 09:47

## 2019-09-03 RX ADMIN — DIVALPROEX SODIUM 500 MG: 500 TABLET, EXTENDED RELEASE ORAL at 17:38

## 2019-09-03 RX ADMIN — DIVALPROEX SODIUM 500 MG: 500 TABLET, EXTENDED RELEASE ORAL at 05:11

## 2019-09-03 RX ADMIN — GABAPENTIN 600 MG: 300 CAPSULE ORAL at 11:23

## 2019-09-03 RX ADMIN — DIPHENHYDRAMINE HYDROCHLORIDE: 50 INJECTION, SOLUTION INTRAMUSCULAR; INTRAVENOUS at 15:02

## 2019-09-03 RX ADMIN — KETOROLAC TROMETHAMINE 15 MG: 30 INJECTION, SOLUTION INTRAMUSCULAR; INTRAVENOUS at 20:22

## 2019-09-03 RX ADMIN — RISPERIDONE 2 MG: 2 TABLET ORAL at 20:22

## 2019-09-03 RX ADMIN — SODIUM CHLORIDE: 9 INJECTION, SOLUTION INTRAVENOUS at 09:46

## 2019-09-03 RX ADMIN — LEVETIRACETAM 1500 MG: 500 TABLET, FILM COATED ORAL at 05:11

## 2019-09-03 RX ADMIN — RISPERIDONE 2 MG: 2 TABLET ORAL at 09:47

## 2019-09-03 RX ADMIN — LEVETIRACETAM 1500 MG: 500 TABLET, FILM COATED ORAL at 18:00

## 2019-09-03 RX ADMIN — ENOXAPARIN SODIUM 40 MG: 100 INJECTION SUBCUTANEOUS at 15:02

## 2019-09-03 RX ADMIN — CYCLOBENZAPRINE HYDROCHLORIDE 5 MG: 10 TABLET, FILM COATED ORAL at 05:11

## 2019-09-03 ASSESSMENT — ENCOUNTER SYMPTOMS
NAUSEA: 0
PHOTOPHOBIA: 0
MYALGIAS: 0
DEPRESSION: 0
DIARRHEA: 0
DIZZINESS: 0
TINGLING: 0
DOUBLE VISION: 0
PALPITATIONS: 0
FEVER: 0
ABDOMINAL PAIN: 0
VOMITING: 0
CHILLS: 0
SHORTNESS OF BREATH: 0
SPEECH CHANGE: 0
HEADACHES: 1
TREMORS: 0
HEARTBURN: 0
BLURRED VISION: 0
SENSORY CHANGE: 0
COUGH: 0

## 2019-09-03 NOTE — PROGRESS NOTES
Hospital Medicine Daily Progress Note    Date of Service  9/3/2019    Chief Complaint  49 y.o. female admitted 8/31/2019 with headache     Hospital Course    49-year-old female past medical history of pseudotumor cerebri status post  shunt stent in place, rheumatoid arthritis with seronegative markers, complex migraine resistant to multiple treatment, seizure/pseudoseizures, chronic narcotics due to headache, complicated infection of pain stimulator placement presented with intractable migraine headache.  Ketamine injections and Dilaudid IV was given in emergency room for patient continues to have headache.  She was admitted for further management of headache with IV pain management and close monitoring for side effects as respiratory distress, constipation, aspiration, sudden death. Etc. Head CT negative.      Interval Problem Update  Patient seen and evaluated by me on rounds  Discussed with nursing staff on rounds, discussed with spouse/significant other at bedside    Upon evaluation by me, patient is noted to be severely sedated, euphoric and just by the first appearance of her today it appears that she would be in respiratory depression/severe CNS depression if sedating medications are not withheld immediately.  Nursing staff in agreement that she has not participated in any interventions at this morning, increasingly somnolent and sedated.  Despite this, upon evaluation by me patient smiling and reports that she still has a headache and she wants to continue ongoing medications.  To me this pattern is highly consistent with a pattern suggestive of drug-seeking behavior/seeking sedative medications.    Otherwise no fever, chills, no concerns for infectious issues.  CBC remained stable.  Chemistry panel stable.    In my professional opinion, if we continue to follow this patient's requests, this would only further harm her, lead to respiratory depression/need for mechanical ventilation/intubation and  potentially death.  I discussed with the patient, if these medications are not helping her, I as the prescribing/ordering physician am not going to be prescribing them any further as they are only increasing the potential for significant harm to her.    Discussed with pharmacy to verify if she does have IM Benadryl being filled at [facility] pharmacy.  If this is the case, can consider reinitiating Benadryl piggyback for infusion over 2 hours.    I have discussed the case with Dr. Melissa Bloch from neurology, she will evaluate the patient and provide input also.    Consultants/Specialty  Neurology    Code Status  Full code     Disposition  Anticipate discharge home tomorrow, monitor today after being significantly sedated, await neurology input.    Review of Systems  Review of Systems   Constitutional: Negative for chills, fever and malaise/fatigue.        Severely sedated and somnolent   HENT: Negative for congestion, hearing loss and tinnitus.    Eyes: Negative for blurred vision, double vision and photophobia.        + aura    Respiratory: Negative for cough and shortness of breath.    Cardiovascular: Negative for chest pain, palpitations and leg swelling.   Gastrointestinal: Negative for abdominal pain, diarrhea, heartburn, nausea and vomiting.   Genitourinary: Negative for dysuria, frequency and urgency.   Musculoskeletal: Positive for joint pain. Negative for myalgias.   Neurological: Positive for headaches. Negative for dizziness, tingling, tremors, sensory change and speech change.   Psychiatric/Behavioral: Negative for depression.        Euphoric appearing        Physical Exam  Temp:  [36.1 °C (96.9 °F)-36.5 °C (97.7 °F)] 36.5 °C (97.7 °F)  Pulse:  [] 98  Resp:  [16-20] 17  BP: ()/(52-66) 105/56  SpO2:  [94 %-99 %] 94 %    Physical Exam   Constitutional: She is oriented to person, place, and time. She appears well-developed and well-nourished. No distress.   Severely euphoric appearing, sedated    HENT:   Head: Normocephalic and atraumatic.   Eyes: Pupils are equal, round, and reactive to light. Right eye exhibits no discharge. Left eye exhibits no discharge.   Neck: Normal range of motion. No tracheal deviation present. No thyromegaly present.   Cardiovascular: Normal rate, regular rhythm and normal heart sounds.   No murmur heard.  Pulmonary/Chest: Effort normal and breath sounds normal. No respiratory distress. She has no wheezes.   Abdominal: Soft. Bowel sounds are normal. She exhibits no distension. There is no tenderness. There is no rebound.   Musculoskeletal: Normal range of motion. She exhibits no edema.   Neurological: She is alert and oriented to person, place, and time. No cranial nerve deficit.   Euphoric appearing, sedated, moving all extremities   Skin: Skin is warm.   Psychiatric: She has a normal mood and affect. Her behavior is normal.   Euphoric appearing, sedated   Nursing note and vitals reviewed.      Fluids    Intake/Output Summary (Last 24 hours) at 9/3/2019 1226  Last data filed at 9/3/2019 0653  Gross per 24 hour   Intake 66.9 ml   Output --   Net 66.9 ml       Laboratory  Recent Labs     09/02/19  0333 09/03/19  0248   WBC 8.5 10.4   RBC 3.50* 3.77*   HEMOGLOBIN 11.1* 12.0   HEMATOCRIT 34.6* 37.5   MCV 98.9* 99.5*   MCH 31.7 31.8   MCHC 32.1* 32.0*   RDW 46.2 47.0   PLATELETCT 267 306   MPV 9.9 9.9     Recent Labs     09/02/19  0333 09/03/19  0248   SODIUM 136 138   POTASSIUM 4.6 4.4   CHLORIDE 101 102   CO2 29 29   GLUCOSE 106* 117*   BUN 3* 6*   CREATININE 0.33* 0.53   CALCIUM 8.3* 8.7                   Imaging  CT-HEAD W/O   Final Result         1.  No acute intracranial abnormality.   2.  Ventriculoperitoneal shunt, bilateral ventricles appear stable in size compared to prior study.           Assessment/Plan  Headache and focal deficits, likely secondary to Complicated migraine- (present on admission)  Assessment & Plan  Upon my evaluation, 09/03/19 patient is extremely  sedated on ketamine drip.  Per nursing staff, she has not done much today, she is somnolent, not interacting at all.  In my medical opinion, she is imminent threat of respiratory depression, CNS depression and at risk of severe adverse events including aspiration, falls and other comorbidities including risk of mechanical ventilation and death.    Despite above, she continues to ask for pain medications though she is very comfortable at bedside upon my evaluation, sedated and euphoric.  To me, this is highly consistent with substance/narcotic/drug-seeking behavior.    I discussed with the patient, spouse/significant other at bedside  At this time strict aspiration, fall precautions to continue, n.p.o. pending improvement in mental status  I informed them that I will be stopping all sedating medications  I informed them that I would not be prescribing any further orders of the sedating medications  I informed them that I would not cause more harm if these medications are not helping the patient  As needed Toradol to remain available    I discussed with the patient, my intention is her safety.  It is my professional medical opinion at this time that ongoing sedative medications but only further home this patient.  This has been a repetitive issue for the patient, she may have an underlying pathology but I do not believe that at this time she is in true pain given my evaluation today.  I have requested consultation from Dr. Melissa Bloch from neurology who will provide formal input.  She agrees with me at this time, with holding all medications and monitoring.    I have discussed with pharmacy, per patient she received a prescription for IM Benadryl, pharmacy staff to verify with [facility] pharmacy and if it indeed is the case patient will be given a piggyback infused over 2 hours of Benadryl 50 mg as opposed to Benadryl pushes.    I have personally discussed the case with chart nursing, patient's nursing staff    Stop  steroids, Flexeril, holding at home oxycodone can resume once more awake  Can continue gabapentin for now    Substance dependence (HCC)- (present on admission)  Assessment & Plan  Avoid sedating medications as able    Seizures (HCC)- (present on admission)  Assessment & Plan  Continue depakote and keppra     S/P  shunt- (present on admission)  Assessment & Plan  CT scan showed no abnormalities   Follow up with neurosurgery as OP     Drug-seeking behavior- (present on admission)  Assessment & Plan  I have informed the patient and her  that no more narcotic/sedative medications will be prescribed given her condition today and high risk of harm to the patient  Nursing team aware of my recommendations    Rheumatoid arthritis(714.0)- (present on admission)  Assessment & Plan  Recommended outpatient rheumatology follow-up    Hyperlipidemia- (present on admission)  Assessment & Plan  Follow-up PCP, further management per PCP in the outpatient setting    Depression- (present on admission)  Assessment & Plan  Continue cymbalta   Hold on buspar. High risk for serotonin syndrome     Anxiety- (present on admission)  Assessment & Plan  Stable, avoid sedating medications       VTE prophylaxis: SCD / SC Lovenox

## 2019-09-03 NOTE — CARE PLAN
Problem: Communication  Goal: The ability to communicate needs accurately and effectively will improve  Outcome: PROGRESSING SLOWER THAN EXPECTED  Note:   Pt drowsy for most of shift       Problem: Pain Management  Goal: Pain level will decrease to patient's comfort goal  Note:   No signs of pain noted today, Ketamine drip stopped for safety.

## 2019-09-03 NOTE — CARE PLAN
Problem: Fluid Volume:  Goal: Will maintain balanced intake and output  Outcome: PROGRESSING AS EXPECTED     Problem: Pain Management  Goal: Pain level will decrease to patient's comfort goal  Outcome: PROGRESSING SLOWER THAN EXPECTED

## 2019-09-03 NOTE — PROGRESS NOTES
Pt is often crying and upset when caregivers are in the room, but when pt does not see that she is observed is quietly eating.  Pt needed frequent education on the dangers of medications, and overmedication.

## 2019-09-03 NOTE — DISCHARGE PLANNING
Care Transition Team Assessment  The information gathered for this assessment was provided by pt's spouse, Benoit. Pt sleeping at this time. Pt lives on the second floor of an apartment complex with her  and 20 year old son. Pt is disabled and receives $1096 a month from disability. Pt independent with ADL's/IADL's prior to admission.     LSW aware of no needs to anticipate for d/c at the time of this assessment.     Information Source  Orientation : Oriented x 4  Information Given By: Spouse  Informant's Name: (Benoit Pang)  Who is responsible for making decisions for patient? : Patient    Elopement Risk  Legal Hold: No  Ambulatory or Self Mobile in Wheelchair: Yes  Disoriented: No  Psychiatric Symptoms: None  History of Wandering: No  Elopement this Admit: No  Vocalizing Wanting to Leave: No  Displays Behaviors, Body Language Wanting to Leave: No-Not at Risk for Elopement  Elopement Risk: Not at Risk for Elopement    Interdisciplinary Discharge Planning  Lives with - Patient's Self Care Capacity: Spouse, Adult Children  Patient or legal guardian wants to designate a caregiver (see row info): No  Housing / Facility: 2 Story Apartment / Condo  Prior Services: None    Discharge Preparedness  What is your plan after discharge?: Uncertain - pending medical team collaboration  What are your discharge supports?: Child, Spouse  Prior Functional Level: Ambulatory, Independent with Activities of Daily Living  Difficulity with ADLs: None  Difficulity with IADLs: None    Functional Assesment  Prior Functional Level: Ambulatory, Independent with Activities of Daily Living    Finances  Financial Barriers to Discharge: No  Prescription Coverage: Yes    Vision / Hearing Impairment  Vision Impairment : Yes  Right Eye Vision: Impaired(vision blurry with increased light)  Left Eye Vision: Impaired(vision blurry with increased light)  Hearing Impairment : No    Domestic Abuse  Have you ever been the victim of abuse or  violence?: No  Physical Abuse or Sexual Abuse: No  Verbal Abuse or Emotional Abuse: No  Possible Abuse Reported to:: Not Applicable    Psychological Assessment  History of Substance Abuse: None  History of Psychiatric Problems: No  Non-compliant with Treatment: No    Discharge Risks or Barriers  Discharge risks or barriers?: No    Anticipated Discharge Information  Anticipated discharge disposition: Discharge needs currently unknown

## 2019-09-03 NOTE — CARE PLAN
Problem: Communication  Goal: The ability to communicate needs accurately and effectively will improve  Outcome: PROGRESSING AS EXPECTED     Problem: Safety  Goal: Will remain free from injury  Outcome: PROGRESSING AS EXPECTED     Problem: Infection  Goal: Will remain free from infection  Outcome: PROGRESSING AS EXPECTED     Problem: Venous Thromboembolism (VTW)/Deep Vein Thrombosis (DVT) Prevention:  Goal: Patient will participate in Venous Thrombosis (VTE)/Deep Vein Thrombosis (DVT)Prevention Measures  Outcome: PROGRESSING AS EXPECTED     Problem: Bowel/Gastric:  Goal: Normal bowel function is maintained or improved  Outcome: PROGRESSING AS EXPECTED     Problem: Discharge Barriers/Planning  Goal: Patient's continuum of care needs will be met  Outcome: PROGRESSING AS EXPECTED

## 2019-09-03 NOTE — PROGRESS NOTES
0700) Handoff of Ketamine drip, pt participates in conversation somewhat and at change of shift. No signs of pain.   1000) Pt sleepy but rouses, eats breakfast but quickly falls back asleep. Cough noted. Pt ambulates to bathroom.  No signs of pain.  1200) Pt drowsy, rouses to voice but does not stay awake for more than a few seconds at a time. VSS, respiratory rate 17 on monitor.  Ketamine drip discussed with Dr. Oneal.  Pt shows no signs of pain and little interest in medical plan. Ketamine drip stopped. Plan discussed very clearly with pt and .   1500) Benedryl infusion hung late, once patient was alert. No signs of pain noted. Pt is now quick to rouse and startle.  Pt and  ask about Toradol and discuss home medication plan.  Pt remains on respiratory monitor.

## 2019-09-03 NOTE — PROGRESS NOTES
Assumed pt care at 1845. Significant other () ABS. Pt is A&Ox 4. Pt denies CP, SOB, n/v, HA, and blurry/double vision. Pt denies any new n/t. Pt ambulating SBA with strong steady gait. Pt states her pain is not adequately controlled with ketamine drip, pt encouraged to manage pain without additional pharmacological measures, pt and  verbalized understanding. Plan of care discussed, education provided on administered medications. All questions and concerns addresed. Fall precautions with hourly rounding and Q 4hr neuro checks in place.

## 2019-09-04 ENCOUNTER — PATIENT OUTREACH (OUTPATIENT)
Dept: HEALTH INFORMATION MANAGEMENT | Facility: OTHER | Age: 49
End: 2019-09-04

## 2019-09-04 VITALS
TEMPERATURE: 97.3 F | HEART RATE: 78 BPM | WEIGHT: 170 LBS | HEIGHT: 63 IN | RESPIRATION RATE: 16 BRPM | DIASTOLIC BLOOD PRESSURE: 68 MMHG | OXYGEN SATURATION: 93 % | BODY MASS INDEX: 30.12 KG/M2 | SYSTOLIC BLOOD PRESSURE: 117 MMHG

## 2019-09-04 PROCEDURE — 700111 HCHG RX REV CODE 636 W/ 250 OVERRIDE (IP): Performed by: INTERNAL MEDICINE

## 2019-09-04 PROCEDURE — 700102 HCHG RX REV CODE 250 W/ 637 OVERRIDE(OP): Performed by: HOSPITALIST

## 2019-09-04 PROCEDURE — 700111 HCHG RX REV CODE 636 W/ 250 OVERRIDE (IP): Performed by: HOSPITALIST

## 2019-09-04 PROCEDURE — 99239 HOSP IP/OBS DSCHRG MGMT >30: CPT | Performed by: INTERNAL MEDICINE

## 2019-09-04 PROCEDURE — A9270 NON-COVERED ITEM OR SERVICE: HCPCS | Performed by: HOSPITALIST

## 2019-09-04 RX ADMIN — GABAPENTIN 600 MG: 300 CAPSULE ORAL at 04:46

## 2019-09-04 RX ADMIN — ASPIRIN 81 MG 81 MG: 81 TABLET ORAL at 04:47

## 2019-09-04 RX ADMIN — LEVETIRACETAM 1500 MG: 500 TABLET, FILM COATED ORAL at 04:46

## 2019-09-04 RX ADMIN — RISPERIDONE 2 MG: 2 TABLET ORAL at 08:47

## 2019-09-04 RX ADMIN — DULOXETINE HYDROCHLORIDE 60 MG: 60 CAPSULE, DELAYED RELEASE ORAL at 08:47

## 2019-09-04 RX ADMIN — HEPARIN 500 UNITS: 100 SYRINGE at 09:00

## 2019-09-04 RX ADMIN — KETOROLAC TROMETHAMINE 15 MG: 30 INJECTION, SOLUTION INTRAMUSCULAR; INTRAVENOUS at 04:47

## 2019-09-04 RX ADMIN — DIVALPROEX SODIUM 500 MG: 500 TABLET, EXTENDED RELEASE ORAL at 04:47

## 2019-09-04 RX ADMIN — ENOXAPARIN SODIUM 40 MG: 100 INJECTION SUBCUTANEOUS at 04:47

## 2019-09-04 NOTE — PROGRESS NOTES
Patient is being discharged home. Port deaccessed per protocol. Patient tolerated well.  Discharge instructions explained and questions answered. No medication changes. Discharged home via wheelchair to private car with .

## 2019-09-04 NOTE — PROGRESS NOTES
Pt care assumed approximately 1900    Bedside report from day shift RN  Pt in bed A&Ox4  Family at bedside  Pt expresses no needs at this time  Call light and belongings with in reach  Bed locked and in lowest position  Hourly rounding provided

## 2019-09-04 NOTE — DISCHARGE SUMMARY
Discharge Summary    CHIEF COMPLAINT ON ADMISSION  Chief Complaint   Patient presents with   • Migraine     came on quickly and intense per pt       Reason for Admission  Headache     Admission Date  8/31/2019    CODE STATUS  Full Code    HPI & HOSPITAL COURSE  This is a 49 y.o. female here with Migraine.     Patient with history of intractable migraines, requiring recurrent hospitalizations and sedating/narcotic medications.  History of  shunt placement.  On presentation CT of the head negative for acute concerns.  Patient presented to the emergency department on August 31, 2019 and she was admitted to the hospital.  During the hospitalization, patient was noted to be persistently asking for sedative medications including intravenous Benadryl, Dilaudid and ultimately requested to be initiated on IV ketamine.  Given failure and improvement of her symptoms, she was initiated on IV ketamine on September 2, 2019.     I assumed the care of this patient on September 3, 2019, she was on IV ketamine and at this time patient was noted to be profoundly euphoric/sedated.  Despite this, she kept on asking for ongoing ketamine infusions and persistent use of sedative medications while she appeared very comfortable / euphoric on sedatives.  This to me was highly consistent with a narcotic/sedative seeking behavior and hence all sedative medications were stopped.  Patient in my opinion was very close to a catastrophe at this time including respiratory depression and need for intubation.  Once off ketamine, she remained sedated for the next 24 hours.  Overall patient has persistently made commands that nothing helps her headaches including the sedatives she was on and narcotics she was on during this hospitalization.  In my professional opinion, I would recommend avoiding all sedatives/narcotics in the future unless opinion from an expert such as a neurologist is obtained prior to initiating these regimens.  To note, patient  will request for these agents on repeated hospitalization and presentations to the emergency department.    Upon evaluation on September 4, 2019, patient reports to be back to her baseline and wants to discharge home.  She is awake and alert and oriented x4.  Having food at this time, no issues.  Somnolence has resolved.  She does not need any further monitoring after receiving the sedatives.  She is stable and cleared for discharge home.    Discharge planning discussed with the patient, counseled and educated regarding her behavior.  To follow-up with her primary care physician and headache neurology.  Schedulers informed to arrange outpatient follow-up.      Therefore, she is discharged in good and stable condition to home with close outpatient follow-up.    The patient met 2-midnight criteria for an inpatient stay at the time of discharge.    Discharge Date  09/04/19    FOLLOW UP ITEMS POST DISCHARGE    Discharge Instructions per Liseth Oneal M.D.    Follow-up with primary care physician and headache neurology    DIET: Regular diet    ACTIVITY: As tolerated    DIAGNOSIS: Headache    Return to ER if recurrent symptoms, avoid sedative medications                    DISCHARGE DIAGNOSES  Active Problems:    Headache and focal deficits, likely secondary to Complicated migraine POA: Yes    S/P  shunt POA: Yes    Seizures (HCC) POA: Yes    Substance dependence (HCC) POA: Yes    Anxiety POA: Yes    Depression POA: Yes    Hyperlipidemia POA: Yes    Rheumatoid arthritis(714.0) POA: Yes    Drug-seeking behavior POA: Yes  Resolved Problems:    * No resolved hospital problems. *      FOLLOW UP  No future appointments.  Your neurosurgeon            MEDICATIONS ON DISCHARGE     Medication List      CONTINUE taking these medications      Instructions   aspirin 81 MG Chew chewable tablet  Commonly known as:  ASA   Take 81 mg by mouth every morning.  Dose:  81 mg     busPIRone 5 MG tablet  Commonly known as:  BUSPAR   Take 5 mg  "by mouth 2 times a day.  Dose:  5 mg     diphenhydrAMINE 50 MG/ML Soln  Commonly known as:  BENADRYL   50 mg by Intramuscular route every 6 hours as needed (Migraines). Indications: migraine  Dose:  50 mg     divalproex  MG Tb24  Commonly known as:  DEPAKOTE ER   Take 500 mg by mouth 3 times a day.  Dose:  500 mg     DULoxetine 60 MG Cpep delayed-release capsule  Commonly known as:  CYMBALTA   Take 60 mg by mouth 2 times a day.  Dose:  60 mg     EMGALITY 120 MG/ML Soaj  Generic drug:  Galcanezumab-gnlm   Q30 DAYS.     gabapentin 600 MG tablet  Commonly known as:  NEURONTIN   Take 600 mg by mouth 3 times a day.  Dose:  600 mg     ketorolac 60 MG/2ML Soln  Commonly known as:  TORADOL   60 mg by Intramuscular route as needed. Indications: Migraine Headache  Dose:  60 mg     levETIRAcetam 500 MG Tabs  Commonly known as:  KEPPRA   Take 1,500 mg by mouth 2 times a day.  Dose:  1,500 mg     oxyCODONE immediate release 10 MG immediate release tablet  Commonly known as:  ROXICODONE   Take 10 mg by mouth every four hours as needed for Moderate Pain.  Dose:  10 mg     risperiDONE 2 MG Tabs  Commonly known as:  RISPERDAL   Take 2 mg by mouth 2 times a day.  Dose:  2 mg            Allergies  Allergies   Allergen Reactions   • Sumatriptan Unspecified     Historical=\"Heart stops.\" Reaction  between 1995 to 1997   • Prochlorperazine Swelling     Tongue swelling. Reaction as a teen. (compazine)  Tolerated Phenergan on 02/24/15   • Vancomycin Shortness of Breath and Rash     Reaction in 2005.  D/W patient 8/31/15 - tolerated loading dose of vancomycin administered on 8/30/15 with some itching to chest with decreased infusion rate. Red Man Syndrome       DIET  Orders Placed This Encounter   Procedures   • Diet Order Regular     Standing Status:   Standing     Number of Occurrences:   1     Order Specific Question:   Diet:     Answer:   Regular [1]       ACTIVITY  As tolerated.  Weight bearing as " tolerated    CONSULTATIONS  Neurology    PROCEDURES  None    LABORATORY  Lab Results   Component Value Date    SODIUM 138 09/03/2019    POTASSIUM 4.4 09/03/2019    CHLORIDE 102 09/03/2019    CO2 29 09/03/2019    GLUCOSE 117 (H) 09/03/2019    BUN 6 (L) 09/03/2019    CREATININE 0.53 09/03/2019        Lab Results   Component Value Date    WBC 10.4 09/03/2019    HEMOGLOBIN 12.0 09/03/2019    HEMATOCRIT 37.5 09/03/2019    PLATELETCT 306 09/03/2019        Total time of the discharge process exceeds 33 minutes.

## 2019-09-04 NOTE — CONSULTS
"Hospital Neurology Consult:    Referring Physician: Liseth Oneal M.D.    Reason for consultation: Head pain    HPI: Enedelia Pang is a 49 y.o. female with history of a  shunt for pseudotumor cerebri presenting to the hospital for narcotic pain management and consulted for help with attempts to wean her off this cocktail of sedating medications as she is showing signs of respiratory depression, constipation and they are not helping her.    ROS: Patient is somnolent and not answering my questions but review of chart patient states headaches and joint pain or chronically an issue.    As above. All other systems reviewed and are negative.    Past Medical History:    has a past medical history of Allergy, unspecified not elsewhere classified, Anemia (10/05/2017), Anxiety, autoimmune, Depression, H/O Clostridium difficile infection, MRSA infection, Hydrocephalus, Migraine with aura, with intractable migraine, so stated, without mention of status migrainosus, MRSA (methicillin resistant Staphylococcus aureus), Pituitary abnormality ([facility]), Staph infection, and Stroke ([facility]). She also has no past medical history of Addisons disease ([facility]).    FHx:  family history includes No Known Problems in her father and mother.    SHx:   reports that she has never smoked. She has never used smokeless tobacco. She reports that she drank alcohol. She reports that she does not use drugs.    Allergies:  Allergies   Allergen Reactions   • Sumatriptan Unspecified     Historical=\"Heart stops.\" Reaction  between 1995 to 1997   • Prochlorperazine Swelling     Tongue swelling. Reaction as a teen. (compazine)  Tolerated Phenergan on 02/24/15   • Vancomycin Shortness of Breath and Rash     Reaction in 2005.  D/W patient 8/31/15 - tolerated loading dose of vancomycin administered on 8/30/15 with some itching to chest with decreased infusion rate. Red Man Syndrome       Medications:    Current Facility-Administered Medications:   •  enoxaparin " (LOVENOX) inj 40 mg, 40 mg, Subcutaneous, DAILY, Liseth Oneal M.D., 40 mg at 09/03/19 1502  •  aspirin (ASA) chewable tab 81 mg, 81 mg, Oral, QAM, Phyllis Rodriguez M.D., 81 mg at 09/03/19 0511  •  divalproex ER (DEPAKOTE ER) tablet 500 mg, 500 mg, Oral, BID, Phyllis Rodriguez M.D., 500 mg at 09/03/19 1738  •  DULoxetine (CYMBALTA) capsule 60 mg, 60 mg, Oral, BID, Phyllis Rodriguez M.D., 60 mg at 09/03/19 0947  •  gabapentin (NEURONTIN) capsule 600 mg, 600 mg, Oral, TID, Phyllis Rodriguez M.D., 600 mg at 09/03/19 1738  •  ketorolac (TORADOL) injection 15 mg, 15 mg, Intravenous, Q6HRS PRN, Phyllis Rodriguez M.D., 15 mg at 09/02/19 1219  •  levETIRAcetam (KEPPRA) tablet 1,500 mg, 1,500 mg, Oral, BID, Phyllis Rodriguez M.D., 1,500 mg at 09/03/19 1800  •  risperiDONE (RISPERDAL) tablet 2 mg, 2 mg, Oral, BID, Phyllis Rodriguez M.D., 2 mg at 09/03/19 0947  •  acetaminophen (TYLENOL) tablet 650 mg, 650 mg, Oral, Q6HRS PRN, Phyllis Rodriguez M.D., 650 mg at 09/02/19 0330  •  labetalol (NORMODYNE,TRANDATE) injection 10 mg, 10 mg, Intravenous, Q4HRS PRN, Phyllis Rodriguez M.D.  •  enalaprilat (VASOTEC) injection 1.25 mg, 1.25 mg, Intravenous, Q6HRS PRN, Phyllis Rodriguez M.D.  •  cloNIDine (CATAPRES) tablet 0.1 mg, 0.1 mg, Oral, Q6HRS PRN, Phyllis Rodriguez M.D.  •  ondansetron (ZOFRAN) syringe/vial injection 4 mg, 4 mg, Intravenous, Q4HRS PRN, Phyllis Rodriguez M.D.  •  ondansetron (ZOFRAN ODT) dispertab 4 mg, 4 mg, Oral, Q4HRS PRN, Phyllis Rodriguez M.D., 4 mg at 08/31/19 1753    Vitals:   Vitals:    09/03/19 0400 09/03/19 0700 09/03/19 1200 09/03/19 1600   BP: 101/59 118/66 105/56 104/57   Pulse: (!) 106 91 98 95   Resp: 18 17 17 17   Temp: 36.3 °C (97.4 °F) 36.1 °C (96.9 °F) 36.5 °C (97.7 °F) 36.7 °C (98.1 °F)   TempSrc: Temporal Temporal Temporal Temporal   SpO2: 98% 94% 94% 100%   Weight:       Height:           Labs:  Lab Results   Component Value Date/Time    PROTHROMBTM 14.4 05/28/2019 06:45 PM    INR 1.11 05/28/2019 06:45 PM      Lab Results   Component  Value Date/Time    WBC 10.4 09/03/2019 02:48 AM    RBC 3.77 (L) 09/03/2019 02:48 AM    HEMOGLOBIN 12.0 09/03/2019 02:48 AM    HEMATOCRIT 37.5 09/03/2019 02:48 AM    MCV 99.5 (H) 09/03/2019 02:48 AM    MCH 31.8 09/03/2019 02:48 AM    MCHC 32.0 (L) 09/03/2019 02:48 AM    MPV 9.9 09/03/2019 02:48 AM    NEUTSPOLYS 79.80 (H) 09/03/2019 02:48 AM    LYMPHOCYTES 13.10 (L) 09/03/2019 02:48 AM    MONOCYTES 6.20 09/03/2019 02:48 AM    EOSINOPHILS 0.20 09/03/2019 02:48 AM    BASOPHILS 0.30 09/03/2019 02:48 AM    HYPOCHROMIA 1+ 01/11/2017 02:29 PM    ANISOCYTOSIS 1+ 01/11/2017 02:29 PM      Lab Results   Component Value Date/Time    SODIUM 138 09/03/2019 02:48 AM    POTASSIUM 4.4 09/03/2019 02:48 AM    CHLORIDE 102 09/03/2019 02:48 AM    CO2 29 09/03/2019 02:48 AM    GLUCOSE 117 (H) 09/03/2019 02:48 AM    BUN 6 (L) 09/03/2019 02:48 AM    CREATININE 0.53 09/03/2019 02:48 AM      Lab Results   Component Value Date/Time    CHOLSTRLTOT 150 05/22/2017 03:31 AM    LDL 86 05/22/2017 03:31 AM    HDL 52 05/22/2017 03:31 AM    TRIGLYCERIDE 61 05/22/2017 03:31 AM       Lab Results   Component Value Date/Time    ALKPHOSPHAT 54 09/03/2019 02:48 AM    ASTSGOT 10 (L) 09/03/2019 02:48 AM    ALTSGPT 13 09/03/2019 02:48 AM    TBILIRUBIN 0.2 09/03/2019 02:48 AM      No results found for: TSH  Lab Results   Component Value Date/Time    FREET4 0.78 11/20/2017 03:00 AM    FREET4 0.76 01/05/2017 11:22 PM     No results found for: FREET3    Imaging/Testing:  I reviewed CT of the head which was unremarkable and reviewed previous MRIs of the head  Interpret one, review another    Physical Exam:     General: Patient very somnolent and sedated  Cardio: Normal S1/S2. No peripheral edema.   Pulm: CTAX2. No respiratory distress.   Skin: Warm, dry, no rashes or lesions   Psychiatric: Unable to test  HEENT: Atraumatic head, normal sclera and conjunctiva, moist oral mucosa. No lid lag.  Abdomen: Soft, non tender. No masses or  hepatosplenomegaly.    Neurologic:  Mental Status: Lethargic  Cranial Nerves:  PERRL. EOMi. Face symmetric.   Motor:  Normal muscle tone and bulk.  No abnormal movements.  Reflexes:  2/4 throughout. Flexor plantar responses bilaterally. Absent Middleton's reflex.  Coordination:  Finger-nose/heel-shin without ataxia or dysmetria.   Sensation:  Normal to soft touch as much as patient responds.  Gait/Station: unable to test       Assessment/Plan:    Enedelia Pang is a 49 y.o. female with history of  shunt presenting to the hospital for pain and consulted for headache management and seizure management.    Plan: I reviewed patient's chart in detail.  This is the 11th hospital visit for the pain control in the calendar year 2019.  Patient at this visit states that none of the usual treatments help her as they have in the past.  Imaging studies have been unremarkable.  Neurosurgery believes the shunt is working as it should.  Patient is becoming somnolent from amount of sedating medication she is asking for.  Patient is demonstrating some drug-seeking behavior. I agree with plan of Dr. Oneal to wean her off her medications so as not to cause more serious issues related to respiratory depression.  Patient has outpatient pain management doctor to whom she can return.  Patient has not had any seizures during this visit and I recommend she continues on her current medications Depakote and Keppra.  Plan discussed with consulting physician and patient's nurse.       Melissa P Bloch, M.D., Diplomat of the American Board of Psychiatry and Neurology  Assistant Clinical Professor, [facility] Neurology Consultant    [address]

## 2019-09-04 NOTE — DISCHARGE INSTRUCTIONS
Discharge Instructions per Liseth Oneal M.D.    Follow-up with primary care physician and headache neurology    DIET: Regular diet    ACTIVITY: As tolerated    DIAGNOSIS: Headache    Return to ER if recurrent symptoms, avoid sedative medications    Discharge Instructions    Discharged to home by car with relative. Discharged via wheelchair, hospital escort: Yes.  Special equipment needed: None    Be sure to schedule a follow-up appointment with your primary care doctor or any specialists as instructed.     Discharge Plan:   Influenza Vaccine Indication: Patient Refuses    I understand that a diet low in cholesterol, fat, and sodium is recommended for good health. Unless I have been given specific instructions below for another diet, I accept this instruction as my diet prescription.   Other diet: Regular    Special Instructions: None    · Is patient discharged on Warfarin / Coumadin?   No     Depression / Suicide Risk    As you are discharged from this [facility] facility, it is important to learn how to keep safe from harming yourself.    Recognize the warning signs:  · Abrupt changes in personality, positive or negative- including increase in energy   · Giving away possessions  · Change in eating patterns- significant weight changes-  positive or negative  · Change in sleeping patterns- unable to sleep or sleeping all the time   · Unwillingness or inability to communicate  · Depression  · Unusual sadness, discouragement and loneliness  · Talk of wanting to die  · Neglect of personal appearance   · Rebelliousness- reckless behavior  · Withdrawal from people/activities they love  · Confusion- inability to concentrate     If you or a loved one observes any of these behaviors or has concerns about self-harm, here's what you can do:  · Talk about it- your feelings and reasons for harming yourself  · Remove any means that you might use to hurt yourself (examples: pills, rope, extension cords, firearm)  · Get  professional help from the community (Mental Health, Substance Abuse, psychological counseling)  · Do not be alone:Call your Safe Contact- someone whom you trust who will be there for you.  · Call your local CRISIS HOTLINE 490-6851 or 399-908-2941  · Call your local Children's Mobile Crisis Response Team [place] (804) 321-1046 or www.[employer]  · Call the toll free National Suicide Prevention Hotlines   · National Suicide Prevention Lifeline 331-656-XCBH (4221)  · [employer] Hope Line Network 800-SUICIDE (121-3972)      General Headache Without Cause  Introduction  A headache is pain or discomfort felt around the head or neck area. There are many causes and types of headaches. In some cases, the cause may not be found.  Follow these instructions at home:  Managing pain  · Take over-the-counter and prescription medicines only as told by your doctor.  · Lie down in a dark, quiet room when you have a headache.  · If directed, apply ice to the head and neck area:  ¨ Put ice in a plastic bag.  ¨ Place a towel between your skin and the bag.  ¨ Leave the ice on for 20 minutes, 2-3 times per day.  · Use a heating pad or hot shower to apply heat to the head and neck area as told by your doctor.  · Keep lights dim if bright lights bother you or make your headaches worse.  Eating and drinking  · Eat meals on a regular schedule.  · Lessen how much alcohol you drink.  · Lessen how much caffeine you drink, or stop drinking caffeine.  General instructions  · Keep all follow-up visits as told by your doctor. This is important.  · Keep a journal to find out if certain things bring on headaches. For example, write down:  ¨ What you eat and drink.  ¨ How much sleep you get.  ¨ Any change to your diet or medicines.  · Relax by getting a massage or doing other relaxing activities.  · Lessen stress.  · Sit up straight. Do not tighten (tense) your muscles.  · Do not use tobacco products. This includes cigarettes, chewing  tobacco, or e-cigarettes. If you need help quitting, ask your doctor.  · Exercise regularly as told by your doctor.  · Get enough sleep. This often means 7-9 hours of sleep.  Contact a doctor if:  · Your symptoms are not helped by medicine.  · You have a headache that feels different than the other headaches.  · You feel sick to your stomach (nauseous) or you throw up (vomit).  · You have a fever.  Get help right away if:  · Your headache becomes really bad.  · You keep throwing up.  · You have a stiff neck.  · You have trouble seeing.  · You have trouble speaking.  · You have pain in the eye or ear.  · Your muscles are weak or you lose muscle control.  · You lose your balance or have trouble walking.  · You feel like you will pass out (faint) or you pass out.  · You have confusion.  This information is not intended to replace advice given to you by your health care provider. Make sure you discuss any questions you have with your health care provider.  Document Released: 09/26/2009 Document Revised: 05/25/2017 Document Reviewed: 04/11/2016  © 2017 Elsevier

## 2019-09-04 NOTE — PROGRESS NOTES
Discussed with nursing, has remained somnolent despite stopping ketamine - no signs of pain at this time  Continue monitoring     Liseth Oneal M.D.  09/03/19  5:09 PM

## 2019-09-06 NOTE — DOCUMENTATION QUERY
[facility]                                                                       Query Response Note      PATIENT:               BRIGID DAWSON  ACCT #:                  [id number]  MRN:                     [id number]  :                      1970  ADMIT DATE:       2019 12:36 AM  DISCH DATE:        2019 11:13 AM  RESPONDING  PROVIDER #:        [id number]           QUERY TEXT:    Depression is documented in the Medical Record.  Please specify the type.    NOTE:  If an appropriate response is not listed below, please respond with a new note.              The patient's Clinical Indicators include:  Depression, anxiety.  Normal mood and affect  Risk Factors: history of depression, chronic migraines, substance dependence  Treatment: cymbalta  Options provided:   -- MDD, recurrent, in full remission   -- MDD, single episode, in full remission   -- MDD, recurrent, in partial remission   -- MDD, single episode, in partial remission   -- MDD, mild, single episode   -- MDD, mild, recurrent, current episode   -- MDD, moderate, single episode   -- MDD, moderate, recurrent, current episode   -- MDD, severe, single episode, without psychotic features   -- MDD, severe, single episode, with psychotic features   -- MDD, severe, recurrent, current episode, without psychotic features   -- MDD, severe, recurrent, current episode, with psychotic features   -- Situational/Grief Reaction depression   -- Unable to determine      Query created by: John Cronin on 2019 12:54 PM    RESPONSE TEXT:    Unable to determine          Electronically signed by:  SAMEER MONTERO 2019 9:12 AM

## 2019-09-10 DIAGNOSIS — Z01.812 PRE-OPERATIVE LABORATORY EXAMINATION: ICD-10-CM

## 2019-09-10 LAB
APPEARANCE UR: CLEAR
APTT PPP: 30 SEC (ref 24.7–36)
BACTERIA #/AREA URNS HPF: ABNORMAL /HPF
BILIRUB UR QL STRIP.AUTO: NEGATIVE
COLOR UR: YELLOW
EPI CELLS #/AREA URNS HPF: NEGATIVE /HPF
GLUCOSE UR STRIP.AUTO-MCNC: NEGATIVE MG/DL
HYALINE CASTS #/AREA URNS LPF: ABNORMAL /LPF
INR PPP: 0.96 (ref 0.87–1.13)
KETONES UR STRIP.AUTO-MCNC: NEGATIVE MG/DL
LEUKOCYTE ESTERASE UR QL STRIP.AUTO: ABNORMAL
MICRO URNS: ABNORMAL
NITRITE UR QL STRIP.AUTO: NEGATIVE
PH UR STRIP.AUTO: 6.5 [PH] (ref 5–8)
PROT UR QL STRIP: NEGATIVE MG/DL
PROTHROMBIN TIME: 12.9 SEC (ref 12–14.6)
RBC # URNS HPF: ABNORMAL /HPF
RBC UR QL AUTO: NEGATIVE
SP GR UR STRIP.AUTO: 1.01
UROBILINOGEN UR STRIP.AUTO-MCNC: 0.2 MG/DL
WBC #/AREA URNS HPF: ABNORMAL /HPF

## 2019-09-10 PROCEDURE — 81001 URINALYSIS AUTO W/SCOPE: CPT

## 2019-09-10 PROCEDURE — 85730 THROMBOPLASTIN TIME PARTIAL: CPT

## 2019-09-10 PROCEDURE — 85610 PROTHROMBIN TIME: CPT

## 2019-09-10 PROCEDURE — 36415 COLL VENOUS BLD VENIPUNCTURE: CPT

## 2019-09-10 RX ORDER — OMEPRAZOLE 20 MG/1
20 CAPSULE, DELAYED RELEASE ORAL DAILY
COMMUNITY
Start: 2019-07-24 | End: 2020-01-28

## 2019-09-10 RX ORDER — ELECTROLYTES/DEXTROSE
1 SOLUTION, ORAL ORAL DAILY
COMMUNITY
End: 2019-12-12

## 2019-09-10 RX ORDER — VALERIAN ROOT 500 MG
1 CAPSULE ORAL 3 TIMES DAILY PRN
COMMUNITY
End: 2019-10-29

## 2019-09-10 SDOH — HEALTH STABILITY: MENTAL HEALTH: HOW OFTEN DO YOU HAVE 6 OR MORE DRINKS ON ONE OCCASION?: NEVER

## 2019-09-10 NOTE — OR NURSING
"Pre-admit appointment completed. \"Preparing for your procedure\" sheet given to pt along with verbal and written instructions. Pt instructed to continue regularly prescribed medications through the day before surgery. Pt instructed to take the following medications the day of surgery with a sip of water, per anesthesia protocol; buspar, depakote, neurontin, keppra, bactroban, prilosec, resperdal, and if needed-benadry for migraine.    Pt was prescribed bactroban nose ointment due to hx of MRSA and will do 5 day course TID per MD instructions.     Pt given [facility] soap kit to use pre-op due to hx of MRSA and she stated she had to get some of the soap for surgery.  "

## 2019-09-13 ENCOUNTER — HOSPITAL ENCOUNTER (OUTPATIENT)
Facility: MEDICAL CENTER | Age: 49
End: 2019-09-14
Attending: ANESTHESIOLOGY | Admitting: ANESTHESIOLOGY
Payer: MEDICARE

## 2019-09-13 ENCOUNTER — ANESTHESIA EVENT (OUTPATIENT)
Dept: SURGERY | Facility: MEDICAL CENTER | Age: 49
End: 2019-09-13
Payer: MEDICARE

## 2019-09-13 ENCOUNTER — APPOINTMENT (OUTPATIENT)
Dept: RADIOLOGY | Facility: MEDICAL CENTER | Age: 49
End: 2019-09-13
Attending: ANESTHESIOLOGY
Payer: MEDICARE

## 2019-09-13 ENCOUNTER — ANESTHESIA (OUTPATIENT)
Dept: SURGERY | Facility: MEDICAL CENTER | Age: 49
End: 2019-09-13
Payer: MEDICARE

## 2019-09-13 LAB — HCG UR QL: NEGATIVE

## 2019-09-13 PROCEDURE — 700105 HCHG RX REV CODE 258: Performed by: ANESTHESIOLOGY

## 2019-09-13 PROCEDURE — 700102 HCHG RX REV CODE 250 W/ 637 OVERRIDE(OP): Performed by: ANESTHESIOLOGY

## 2019-09-13 PROCEDURE — C1820 GENERATOR NEURO RECHG BAT SY: HCPCS | Performed by: ANESTHESIOLOGY

## 2019-09-13 PROCEDURE — A9270 NON-COVERED ITEM OR SERVICE: HCPCS | Performed by: ANESTHESIOLOGY

## 2019-09-13 PROCEDURE — C1778 LEAD, NEUROSTIMULATOR: HCPCS | Performed by: ANESTHESIOLOGY

## 2019-09-13 PROCEDURE — 160036 HCHG PACU - EA ADDL 30 MINS PHASE I: Performed by: ANESTHESIOLOGY

## 2019-09-13 PROCEDURE — 700111 HCHG RX REV CODE 636 W/ 250 OVERRIDE (IP): Performed by: ANESTHESIOLOGY

## 2019-09-13 PROCEDURE — 72100 X-RAY EXAM L-S SPINE 2/3 VWS: CPT

## 2019-09-13 PROCEDURE — 700101 HCHG RX REV CODE 250: Performed by: ANESTHESIOLOGY

## 2019-09-13 PROCEDURE — 502240 HCHG MISC OR SUPPLY RC 0272: Performed by: ANESTHESIOLOGY

## 2019-09-13 PROCEDURE — 96365 THER/PROPH/DIAG IV INF INIT: CPT

## 2019-09-13 PROCEDURE — G0378 HOSPITAL OBSERVATION PER HR: HCPCS

## 2019-09-13 PROCEDURE — 160009 HCHG ANES TIME/MIN: Performed by: ANESTHESIOLOGY

## 2019-09-13 PROCEDURE — 160002 HCHG RECOVERY MINUTES (STAT): Performed by: ANESTHESIOLOGY

## 2019-09-13 PROCEDURE — 502000 HCHG MISC OR IMPLANTS RC 0278: Performed by: ANESTHESIOLOGY

## 2019-09-13 PROCEDURE — 96376 TX/PRO/DX INJ SAME DRUG ADON: CPT

## 2019-09-13 PROCEDURE — 96375 TX/PRO/DX INJ NEW DRUG ADDON: CPT

## 2019-09-13 PROCEDURE — 160041 HCHG SURGERY MINUTES - EA ADDL 1 MIN LEVEL 4: Performed by: ANESTHESIOLOGY

## 2019-09-13 PROCEDURE — A6402 STERILE GAUZE <= 16 SQ IN: HCPCS | Performed by: ANESTHESIOLOGY

## 2019-09-13 PROCEDURE — 501838 HCHG SUTURE GENERAL: Performed by: ANESTHESIOLOGY

## 2019-09-13 PROCEDURE — A6222 GAUZE <=16 IN NO W/SAL W/O B: HCPCS | Performed by: ANESTHESIOLOGY

## 2019-09-13 PROCEDURE — 160048 HCHG OR STATISTICAL LEVEL 1-5: Performed by: ANESTHESIOLOGY

## 2019-09-13 PROCEDURE — 81025 URINE PREGNANCY TEST: CPT

## 2019-09-13 PROCEDURE — 160029 HCHG SURGERY MINUTES - 1ST 30 MINS LEVEL 4: Performed by: ANESTHESIOLOGY

## 2019-09-13 PROCEDURE — 160035 HCHG PACU - 1ST 60 MINS PHASE I: Performed by: ANESTHESIOLOGY

## 2019-09-13 DEVICE — IMPLANTABLE DEVICE: Type: IMPLANTABLE DEVICE | Site: BACK | Status: FUNCTIONAL

## 2019-09-13 RX ORDER — HYDROMORPHONE HYDROCHLORIDE 1 MG/ML
0.1 INJECTION, SOLUTION INTRAMUSCULAR; INTRAVENOUS; SUBCUTANEOUS
Status: DISCONTINUED | OUTPATIENT
Start: 2019-09-13 | End: 2019-09-13 | Stop reason: HOSPADM

## 2019-09-13 RX ORDER — SODIUM CHLORIDE, SODIUM LACTATE, POTASSIUM CHLORIDE, CALCIUM CHLORIDE 600; 310; 30; 20 MG/100ML; MG/100ML; MG/100ML; MG/100ML
INJECTION, SOLUTION INTRAVENOUS CONTINUOUS
Status: ACTIVE | OUTPATIENT
Start: 2019-09-13 | End: 2019-09-14

## 2019-09-13 RX ORDER — OXYCODONE HCL 5 MG/5 ML
10 SOLUTION, ORAL ORAL
Status: COMPLETED | OUTPATIENT
Start: 2019-09-13 | End: 2019-09-13

## 2019-09-13 RX ORDER — VANCOMYCIN HYDROCHLORIDE 1 G/20ML
INJECTION, POWDER, LYOPHILIZED, FOR SOLUTION INTRAVENOUS
Status: DISPENSED
Start: 2019-09-13 | End: 2019-09-14

## 2019-09-13 RX ORDER — OXYCODONE HYDROCHLORIDE 5 MG/1
5 TABLET ORAL
Status: DISCONTINUED | OUTPATIENT
Start: 2019-09-13 | End: 2019-09-14 | Stop reason: HOSPADM

## 2019-09-13 RX ORDER — METOPROLOL TARTRATE 1 MG/ML
1 INJECTION, SOLUTION INTRAVENOUS
Status: DISCONTINUED | OUTPATIENT
Start: 2019-09-13 | End: 2019-09-13 | Stop reason: HOSPADM

## 2019-09-13 RX ORDER — LIDOCAINE HYDROCHLORIDE AND EPINEPHRINE BITARTRATE 20; .01 MG/ML; MG/ML
INJECTION, SOLUTION SUBCUTANEOUS
Status: DISCONTINUED | OUTPATIENT
Start: 2019-09-13 | End: 2019-09-13 | Stop reason: HOSPADM

## 2019-09-13 RX ORDER — ONDANSETRON 2 MG/ML
4 INJECTION INTRAMUSCULAR; INTRAVENOUS EVERY 4 HOURS PRN
Status: DISCONTINUED | OUTPATIENT
Start: 2019-09-13 | End: 2019-09-14 | Stop reason: HOSPADM

## 2019-09-13 RX ORDER — DIPHENHYDRAMINE HYDROCHLORIDE 50 MG/ML
12.5 INJECTION INTRAMUSCULAR; INTRAVENOUS
Status: DISCONTINUED | OUTPATIENT
Start: 2019-09-13 | End: 2019-09-13 | Stop reason: HOSPADM

## 2019-09-13 RX ORDER — ONDANSETRON 2 MG/ML
4 INJECTION INTRAMUSCULAR; INTRAVENOUS
Status: DISCONTINUED | OUTPATIENT
Start: 2019-09-13 | End: 2019-09-13 | Stop reason: HOSPADM

## 2019-09-13 RX ORDER — OXYCODONE HYDROCHLORIDE 10 MG/1
10 TABLET ORAL
Status: DISCONTINUED | OUTPATIENT
Start: 2019-09-13 | End: 2019-09-14 | Stop reason: HOSPADM

## 2019-09-13 RX ORDER — ONDANSETRON 2 MG/ML
INJECTION INTRAMUSCULAR; INTRAVENOUS PRN
Status: DISCONTINUED | OUTPATIENT
Start: 2019-09-13 | End: 2019-09-13 | Stop reason: SURG

## 2019-09-13 RX ORDER — HYDROMORPHONE HYDROCHLORIDE 1 MG/ML
0.2 INJECTION, SOLUTION INTRAMUSCULAR; INTRAVENOUS; SUBCUTANEOUS
Status: DISCONTINUED | OUTPATIENT
Start: 2019-09-13 | End: 2019-09-13 | Stop reason: HOSPADM

## 2019-09-13 RX ORDER — BACITRACIN 50000 [IU]/1
INJECTION, POWDER, FOR SOLUTION INTRAMUSCULAR
Status: DISCONTINUED | OUTPATIENT
Start: 2019-09-13 | End: 2019-09-13 | Stop reason: HOSPADM

## 2019-09-13 RX ORDER — BUPIVACAINE HYDROCHLORIDE AND EPINEPHRINE 2.5; 5 MG/ML; UG/ML
INJECTION, SOLUTION EPIDURAL; INFILTRATION; INTRACAUDAL; PERINEURAL
Status: DISCONTINUED | OUTPATIENT
Start: 2019-09-13 | End: 2019-09-13 | Stop reason: HOSPADM

## 2019-09-13 RX ORDER — NALOXONE HYDROCHLORIDE 0.4 MG/ML
0.4 INJECTION, SOLUTION INTRAMUSCULAR; INTRAVENOUS; SUBCUTANEOUS PRN
Status: DISCONTINUED | OUTPATIENT
Start: 2019-09-13 | End: 2019-09-14 | Stop reason: HOSPADM

## 2019-09-13 RX ORDER — OXYCODONE HCL 5 MG/5 ML
5 SOLUTION, ORAL ORAL
Status: COMPLETED | OUTPATIENT
Start: 2019-09-13 | End: 2019-09-13

## 2019-09-13 RX ORDER — PHYSOSTIGMINE SALICYLATE 1 MG/ML
1 INJECTION INTRAVENOUS PRN
Status: DISCONTINUED | OUTPATIENT
Start: 2019-09-13 | End: 2019-09-14 | Stop reason: HOSPADM

## 2019-09-13 RX ORDER — SODIUM CHLORIDE, SODIUM LACTATE, POTASSIUM CHLORIDE, CALCIUM CHLORIDE 600; 310; 30; 20 MG/100ML; MG/100ML; MG/100ML; MG/100ML
INJECTION, SOLUTION INTRAVENOUS CONTINUOUS
Status: DISCONTINUED | OUTPATIENT
Start: 2019-09-13 | End: 2019-09-13 | Stop reason: HOSPADM

## 2019-09-13 RX ORDER — CLINDAMYCIN PHOSPHATE 600 MG/50ML
600 INJECTION, SOLUTION INTRAVENOUS EVERY 8 HOURS
Status: DISCONTINUED | OUTPATIENT
Start: 2019-09-13 | End: 2019-09-14 | Stop reason: HOSPADM

## 2019-09-13 RX ORDER — MIDAZOLAM HYDROCHLORIDE 1 MG/ML
1 INJECTION INTRAMUSCULAR; INTRAVENOUS
Status: DISCONTINUED | OUTPATIENT
Start: 2019-09-13 | End: 2019-09-13 | Stop reason: HOSPADM

## 2019-09-13 RX ORDER — DIPHENHYDRAMINE HYDROCHLORIDE 50 MG/ML
INJECTION INTRAMUSCULAR; INTRAVENOUS PRN
Status: DISCONTINUED | OUTPATIENT
Start: 2019-09-13 | End: 2019-09-13 | Stop reason: SURG

## 2019-09-13 RX ORDER — DIPHENHYDRAMINE HCL 25 MG
50 TABLET ORAL EVERY 8 HOURS
Status: DISCONTINUED | OUTPATIENT
Start: 2019-09-13 | End: 2019-09-14 | Stop reason: HOSPADM

## 2019-09-13 RX ORDER — VANCOMYCIN HYDROCHLORIDE 1 G/20ML
INJECTION, POWDER, LYOPHILIZED, FOR SOLUTION INTRAVENOUS
Status: COMPLETED | OUTPATIENT
Start: 2019-09-13 | End: 2019-09-13

## 2019-09-13 RX ORDER — HYDROMORPHONE HYDROCHLORIDE 1 MG/ML
0.5 INJECTION, SOLUTION INTRAMUSCULAR; INTRAVENOUS; SUBCUTANEOUS
Status: DISCONTINUED | OUTPATIENT
Start: 2019-09-13 | End: 2019-09-14 | Stop reason: HOSPADM

## 2019-09-13 RX ORDER — MEPERIDINE HYDROCHLORIDE 25 MG/ML
12.5 INJECTION INTRAMUSCULAR; INTRAVENOUS; SUBCUTANEOUS
Status: DISCONTINUED | OUTPATIENT
Start: 2019-09-13 | End: 2019-09-13 | Stop reason: HOSPADM

## 2019-09-13 RX ORDER — HYDROMORPHONE HYDROCHLORIDE 1 MG/ML
0.4 INJECTION, SOLUTION INTRAMUSCULAR; INTRAVENOUS; SUBCUTANEOUS
Status: DISCONTINUED | OUTPATIENT
Start: 2019-09-13 | End: 2019-09-13 | Stop reason: HOSPADM

## 2019-09-13 RX ORDER — HALOPERIDOL 5 MG/ML
1 INJECTION INTRAMUSCULAR
Status: DISCONTINUED | OUTPATIENT
Start: 2019-09-13 | End: 2019-09-13 | Stop reason: HOSPADM

## 2019-09-13 RX ORDER — HYDRALAZINE HYDROCHLORIDE 20 MG/ML
5 INJECTION INTRAMUSCULAR; INTRAVENOUS
Status: DISCONTINUED | OUTPATIENT
Start: 2019-09-13 | End: 2019-09-13 | Stop reason: HOSPADM

## 2019-09-13 RX ADMIN — PROPOFOL 200 MG: 10 INJECTION, EMULSION INTRAVENOUS at 15:10

## 2019-09-13 RX ADMIN — HYDROMORPHONE HYDROCHLORIDE 0.2 MG: 1 INJECTION, SOLUTION INTRAMUSCULAR; INTRAVENOUS; SUBCUTANEOUS at 17:32

## 2019-09-13 RX ADMIN — SODIUM CHLORIDE, POTASSIUM CHLORIDE, SODIUM LACTATE AND CALCIUM CHLORIDE: 600; 310; 30; 20 INJECTION, SOLUTION INTRAVENOUS at 14:40

## 2019-09-13 RX ADMIN — OXYCODONE HYDROCHLORIDE 10 MG: 5 SOLUTION ORAL at 17:16

## 2019-09-13 RX ADMIN — SODIUM CHLORIDE, POTASSIUM CHLORIDE, SODIUM LACTATE AND CALCIUM CHLORIDE: 600; 310; 30; 20 INJECTION, SOLUTION INTRAVENOUS at 14:28

## 2019-09-13 RX ADMIN — DIPHENHYDRAMINE HCL 50 MG: 25 TABLET ORAL at 22:19

## 2019-09-13 RX ADMIN — ROCURONIUM BROMIDE 50 MG: 10 INJECTION INTRAVENOUS at 15:10

## 2019-09-13 RX ADMIN — FENTANYL CITRATE 50 MCG: 50 INJECTION, SOLUTION INTRAMUSCULAR; INTRAVENOUS at 17:04

## 2019-09-13 RX ADMIN — HYDROMORPHONE HYDROCHLORIDE 0.2 MG: 1 INJECTION, SOLUTION INTRAMUSCULAR; INTRAVENOUS; SUBCUTANEOUS at 18:18

## 2019-09-13 RX ADMIN — PROPOFOL 200 MCG/KG/MIN: 10 INJECTION, EMULSION INTRAVENOUS at 15:10

## 2019-09-13 RX ADMIN — HYDROMORPHONE HYDROCHLORIDE 0.4 MG: 1 INJECTION, SOLUTION INTRAMUSCULAR; INTRAVENOUS; SUBCUTANEOUS at 17:54

## 2019-09-13 RX ADMIN — VANCOMYCIN 1 G: 1 INJECTION, SOLUTION INTRAVENOUS at 15:04

## 2019-09-13 RX ADMIN — DIPHENHYDRAMINE HYDROCHLORIDE 25 MG: 50 INJECTION, SOLUTION INTRAMUSCULAR; INTRAVENOUS at 15:24

## 2019-09-13 RX ADMIN — CLINDAMYCIN IN 5 PERCENT DEXTROSE 600 MG: 12 INJECTION, SOLUTION INTRAVENOUS at 21:53

## 2019-09-13 RX ADMIN — MIDAZOLAM HYDROCHLORIDE 2 MG: 1 INJECTION, SOLUTION INTRAMUSCULAR; INTRAVENOUS at 15:10

## 2019-09-13 RX ADMIN — FENTANYL CITRATE 50 MCG: 50 INJECTION, SOLUTION INTRAMUSCULAR; INTRAVENOUS at 17:15

## 2019-09-13 RX ADMIN — OXYCODONE HYDROCHLORIDE 10 MG: 10 TABLET ORAL at 23:54

## 2019-09-13 RX ADMIN — HYDROMORPHONE HYDROCHLORIDE 0.5 MG: 1 INJECTION, SOLUTION INTRAMUSCULAR; INTRAVENOUS; SUBCUTANEOUS at 21:45

## 2019-09-13 RX ADMIN — ONDANSETRON 4 MG: 2 INJECTION INTRAMUSCULAR; INTRAVENOUS at 15:10

## 2019-09-13 RX ADMIN — FENTANYL CITRATE 100 MCG: 50 INJECTION, SOLUTION INTRAMUSCULAR; INTRAVENOUS at 15:10

## 2019-09-13 RX ADMIN — OXYCODONE HYDROCHLORIDE 10 MG: 10 TABLET ORAL at 20:44

## 2019-09-13 RX ADMIN — HYDROMORPHONE HYDROCHLORIDE 0.2 MG: 1 INJECTION, SOLUTION INTRAMUSCULAR; INTRAVENOUS; SUBCUTANEOUS at 18:12

## 2019-09-13 ASSESSMENT — LIFESTYLE VARIABLES
TOTAL SCORE: 0
EVER HAD A DRINK FIRST THING IN THE MORNING TO STEADY YOUR NERVES TO GET RID OF A HANGOVER: NO
TOTAL SCORE: 0
ALCOHOL_USE: NO
EVER_SMOKED: NEVER
HAVE YOU EVER FELT YOU SHOULD CUT DOWN ON YOUR DRINKING: NO
AVERAGE NUMBER OF DAYS PER WEEK YOU HAVE A DRINK CONTAINING ALCOHOL: 0
CONSUMPTION TOTAL: NEGATIVE
HOW MANY TIMES IN THE PAST YEAR HAVE YOU HAD 5 OR MORE DRINKS IN A DAY: 0
HAVE PEOPLE ANNOYED YOU BY CRITICIZING YOUR DRINKING: NO
EVER FELT BAD OR GUILTY ABOUT YOUR DRINKING: NO
TOTAL SCORE: 0
ON A TYPICAL DAY WHEN YOU DRINK ALCOHOL HOW MANY DRINKS DO YOU HAVE: 0

## 2019-09-13 ASSESSMENT — COGNITIVE AND FUNCTIONAL STATUS - GENERAL
SUGGESTED CMS G CODE MODIFIER DAILY ACTIVITY: CI
MOVING FROM LYING ON BACK TO SITTING ON SIDE OF FLAT BED: A LITTLE
SUGGESTED CMS G CODE MODIFIER MOBILITY: CI
DAILY ACTIVITIY SCORE: 23
MOBILITY SCORE: 23
DRESSING REGULAR UPPER BODY CLOTHING: A LITTLE

## 2019-09-13 ASSESSMENT — COPD QUESTIONNAIRES
DURING THE PAST 4 WEEKS HOW MUCH DID YOU FEEL SHORT OF BREATH: NONE/LITTLE OF THE TIME
IN THE PAST 12 MONTHS DO YOU DO LESS THAN YOU USED TO BECAUSE OF YOUR BREATHING PROBLEMS: DISAGREE/UNSURE
HAVE YOU SMOKED AT LEAST 100 CIGARETTES IN YOUR ENTIRE LIFE: NO/DON'T KNOW
DO YOU EVER COUGH UP ANY MUCUS OR PHLEGM?: NO/ONLY WITH OCCASIONAL COLDS OR INFECTIONS
COPD SCREENING SCORE: 0

## 2019-09-13 ASSESSMENT — PATIENT HEALTH QUESTIONNAIRE - PHQ9
8. MOVING OR SPEAKING SO SLOWLY THAT OTHER PEOPLE COULD HAVE NOTICED. OR THE OPPOSITE, BEING SO FIGETY OR RESTLESS THAT YOU HAVE BEEN MOVING AROUND A LOT MORE THAN USUAL: SEVERAL DAYS
6. FEELING BAD ABOUT YOURSELF - OR THAT YOU ARE A FAILURE OR HAVE LET YOURSELF OR YOUR FAMILY DOWN: SEVERAL DAYS
5. POOR APPETITE OR OVEREATING: MORE THAN HALF THE DAYS
7. TROUBLE CONCENTRATING ON THINGS, SUCH AS READING THE NEWSPAPER OR WATCHING TELEVISION: NEARLY EVERY DAY
4. FEELING TIRED OR HAVING LITTLE ENERGY: NEARLY EVERY DAY
3. TROUBLE FALLING OR STAYING ASLEEP OR SLEEPING TOO MUCH: MORE THAN HALF THE DAYS
9. THOUGHTS THAT YOU WOULD BE BETTER OFF DEAD, OR OF HURTING YOURSELF: NOT AT ALL
1. LITTLE INTEREST OR PLEASURE IN DOING THINGS: MORE THAN HALF THE DAYS
SUM OF ALL RESPONSES TO PHQ QUESTIONS 1-9: 16
SUM OF ALL RESPONSES TO PHQ9 QUESTIONS 1 AND 2: 4
2. FEELING DOWN, DEPRESSED, IRRITABLE, OR HOPELESS: MORE THAN HALF THE DAYS

## 2019-09-13 ASSESSMENT — PAIN SCALES - GENERAL: PAIN_LEVEL: 4

## 2019-09-13 NOTE — ANESTHESIA POSTPROCEDURE EVALUATION
Patient: Enedelia Pnag    Procedure Summary     Date:  09/13/19 Room / Location:   OR  / SURGERY [facility]    Anesthesia Start:  1504 Anesthesia Stop:  1654    Procedure:  INSERTION, NEUROSTIMULATOR, PERMANENT, SPINAL CORD (Back) Diagnosis:  (CERVICO-OCCIPITAL NEURALGIA)    Surgeon:  Seven Mahoney M.D. Responsible Provider:  Dillan Mojica M.D.    Anesthesia Type:  general ASA Status:  2          Final Anesthesia Type: general  Last vitals  BP   NIBP: 125/89    Temp   36.2 °C (97.2 °F)    Pulse   Heart Rate (Monitored): (!) 115   Resp   18    SpO2          Anesthesia Post Evaluation    Patient location during evaluation: PACU  Patient participation: complete - patient participated  Level of consciousness: awake and alert  Pain score: 4    Airway patency: patent  Anesthetic complications: no  Cardiovascular status: hemodynamically stable  Respiratory status: acceptable  Hydration status: euvolemic    PONV: none           Nurse Pain Score: 8 (NPRS)

## 2019-09-13 NOTE — ANESTHESIA TIME REPORT
Anesthesia Start and Stop Event Times     Date Time Event    9/13/2019 1453 Ready for Procedure     1504 Anesthesia Start        Responsible Staff  09/13/19    Name Role Begin End    Dillan Mojica M.D. Anesth 1504         Preop Diagnosis (Free Text):  Pre-op Diagnosis     CERVICO-OCCIPITAL NEURALGIA        Preop Diagnosis (Codes):    Post op Diagnosis  Cervico-occipital neuralgia      Premium Reason  A. 3PM - 7AM    Comments:

## 2019-09-13 NOTE — ANESTHESIA PROCEDURE NOTES
Airway  Date/Time: 9/13/2019 3:12 PM  Performed by: Dillan Mojica M.D.  Authorized by: Dillan Mojica M.D.     Location:  OR  Urgency:  Elective  Indications for Airway Management:  Anesthesia  Spontaneous Ventilation: absent    Sedation Level:  Deep  Preoxygenated: Yes    Patient Position:  Sniffing  Final Airway Type:  Endotracheal airway  Final Endotracheal Airway:  ETT  Cuffed: Yes    Technique Used for Successful ETT Placement:  Direct laryngoscopy  Insertion Site:  Oral  Blade Type:  Salvador  Laryngoscope Blade/Videolaryngoscope Blade Size:  2  ETT Size (mm):  7.0  Measured from:  Lips  Placement Verified by: auscultation and capnometry    Cormack-Lehane Classification:  Grade IIa - partial view of glottis  Number of Attempts at Approach:  1

## 2019-09-13 NOTE — ANESTHESIA PREPROCEDURE EVALUATION
Relevant Problems   PULMONARY   (+) History of surgical site infection      NEURO   (+) H/O: CVA (cerebrovascular accident)   (+) History of surgical site infection   (+) Seizures (HCC)      CARDIAC   (+) Headache and focal deficits, likely secondary to Complicated migraine   (+) Intractable migraine      Other   (+) Rheumatoid arthritis(714.0)       Physical Exam    Airway   Mallampati: II  TM distance: >3 FB  Neck ROM: full       Cardiovascular - normal exam  Rhythm: regular  Rate: normal  (-) murmur     Dental - normal exam         Pulmonary - normal exam  Breath sounds clear to auscultation     Abdominal    Neurological - normal exam         Other findings: Slight overbite            Anesthesia Plan    ASA 2       Plan - general       Airway plan will be ETT        Induction: intravenous    Postoperative Plan: Postoperative administration of opioids is intended.    Pertinent diagnostic labs and testing reviewed    Informed Consent:    Anesthetic plan and risks discussed with patient.    Use of blood products discussed with: patient whom consented to blood products.

## 2019-09-13 NOTE — OR NURSING
1650: Patient to PACU from OR. Nasal airway in mouth.  Respirations even and unlabored.  Breath sounds clear and equal bilaterally.  Dressings to back clean and dry.  1705: Patient reporting severe pain in back, will continue to work on treating with pharmacological and non-pharmacological interventions.  1720: Patient complaining of severe pain, working on helping control patient's pain through medication and non-pharmacological interventions.  Patient has been given a warm blanket, assisted with positioning with pillows, and ice packs provided to back and neck.    1735: Patient still reporting pain in back, switching to dilaudid as fentanyl did not give the patient much relief.    1750: Patient still reporting pain, states the dilauded seemed to help more than the fentanyl.   1800: Report to CAROLINA Woodward.

## 2019-09-14 VITALS
SYSTOLIC BLOOD PRESSURE: 124 MMHG | OXYGEN SATURATION: 99 % | BODY MASS INDEX: 29.84 KG/M2 | TEMPERATURE: 98.2 F | WEIGHT: 168.43 LBS | RESPIRATION RATE: 18 BRPM | HEART RATE: 81 BPM | HEIGHT: 63 IN | DIASTOLIC BLOOD PRESSURE: 68 MMHG

## 2019-09-14 PROCEDURE — 90471 IMMUNIZATION ADMIN: CPT

## 2019-09-14 PROCEDURE — 700111 HCHG RX REV CODE 636 W/ 250 OVERRIDE (IP): Performed by: ANESTHESIOLOGY

## 2019-09-14 PROCEDURE — A9270 NON-COVERED ITEM OR SERVICE: HCPCS | Performed by: ANESTHESIOLOGY

## 2019-09-14 PROCEDURE — 700101 HCHG RX REV CODE 250: Performed by: ANESTHESIOLOGY

## 2019-09-14 PROCEDURE — 96376 TX/PRO/DX INJ SAME DRUG ADON: CPT

## 2019-09-14 PROCEDURE — 700102 HCHG RX REV CODE 250 W/ 637 OVERRIDE(OP): Performed by: ANESTHESIOLOGY

## 2019-09-14 PROCEDURE — 90686 IIV4 VACC NO PRSV 0.5 ML IM: CPT | Performed by: ANESTHESIOLOGY

## 2019-09-14 PROCEDURE — G0378 HOSPITAL OBSERVATION PER HR: HCPCS

## 2019-09-14 RX ADMIN — CLINDAMYCIN IN 5 PERCENT DEXTROSE 600 MG: 12 INJECTION, SOLUTION INTRAVENOUS at 05:30

## 2019-09-14 RX ADMIN — CLINDAMYCIN IN 5 PERCENT DEXTROSE 600 MG: 12 INJECTION, SOLUTION INTRAVENOUS at 13:10

## 2019-09-14 RX ADMIN — HYDROMORPHONE HYDROCHLORIDE 0.5 MG: 1 INJECTION, SOLUTION INTRAMUSCULAR; INTRAVENOUS; SUBCUTANEOUS at 04:50

## 2019-09-14 RX ADMIN — INFLUENZA A VIRUS A/BRISBANE/02/2018 IVR-190 (H1N1) ANTIGEN (FORMALDEHYDE INACTIVATED), INFLUENZA A VIRUS A/KANSAS/14/2017 X-327 (H3N2) ANTIGEN (FORMALDEHYDE INACTIVATED), INFLUENZA B VIRUS B/PHUKET/3073/2013 ANTIGEN (FORMALDEHYDE INACTIVATED), AND INFLUENZA B VIRUS B/MARYLAND/15/2016 BX-69A ANTIGEN (FORMALDEHYDE INACTIVATED) 0.5 ML: 15; 15; 15; 15 INJECTION, SUSPENSION INTRAMUSCULAR at 05:31

## 2019-09-14 RX ADMIN — OXYCODONE HYDROCHLORIDE 10 MG: 10 TABLET ORAL at 03:23

## 2019-09-14 RX ADMIN — DIPHENHYDRAMINE HCL 50 MG: 25 TABLET ORAL at 13:10

## 2019-09-14 RX ADMIN — OXYCODONE HYDROCHLORIDE 10 MG: 10 TABLET ORAL at 13:09

## 2019-09-14 RX ADMIN — OXYCODONE HYDROCHLORIDE 10 MG: 10 TABLET ORAL at 06:58

## 2019-09-14 RX ADMIN — HYDROMORPHONE HYDROCHLORIDE 0.5 MG: 1 INJECTION, SOLUTION INTRAMUSCULAR; INTRAVENOUS; SUBCUTANEOUS at 11:39

## 2019-09-14 RX ADMIN — HYDROMORPHONE HYDROCHLORIDE 0.5 MG: 1 INJECTION, SOLUTION INTRAMUSCULAR; INTRAVENOUS; SUBCUTANEOUS at 14:43

## 2019-09-14 RX ADMIN — DIPHENHYDRAMINE HCL 50 MG: 25 TABLET ORAL at 05:29

## 2019-09-14 RX ADMIN — HEPARIN 500 UNITS: 100 SYRINGE at 14:44

## 2019-09-14 RX ADMIN — HYDROMORPHONE HYDROCHLORIDE 0.5 MG: 1 INJECTION, SOLUTION INTRAMUSCULAR; INTRAVENOUS; SUBCUTANEOUS at 08:30

## 2019-09-14 RX ADMIN — HYDROMORPHONE HYDROCHLORIDE 0.5 MG: 1 INJECTION, SOLUTION INTRAMUSCULAR; INTRAVENOUS; SUBCUTANEOUS at 01:33

## 2019-09-14 NOTE — DISCHARGE INSTRUCTIONS
Discharge Instructions    Discharged to home by car with relative. Discharged via wheelchair, hospital escort: Yes.  Special equipment needed: Not Applicable    Be sure to schedule a follow-up appointment with your primary care doctor or any specialists as instructed.   Contact Number: [phone]   Call if you have any questions or concerns.    Discharge Plan:   Influenza Vaccine Indication: Indicated: 9 to 64 years of age  Influenza Vaccine Given - only chart on this line when given: Influenza Vaccine Given (See MAR)    I understand that a diet low in cholesterol, fat, and sodium is recommended for good health. Unless I have been given specific instructions below for another diet, I accept this instruction as my diet prescription.   Other diet: n/a    Special Instructions:      · Regular diet.  · Okay to use ice over incision sites.  · Activity as tolerated.  · Okay to shower 72 hours after procedure.  · No baths, hot tub, or pool until you follow-up with your provider.  · Do not drive, do not operate heavy equipment, and do not make any important life decisions on the day of your procedure.  · Leave dressings on for 7 days then take off.        · Is patient discharged on Warfarin / Coumadin?   No     Depression / Suicide Risk    As you are discharged from this [facility] facility, it is important to learn how to keep safe from harming yourself.    Recognize the warning signs:  · Abrupt changes in personality, positive or negative- including increase in energy   · Giving away possessions  · Change in eating patterns- significant weight changes-  positive or negative  · Change in sleeping patterns- unable to sleep or sleeping all the time   · Unwillingness or inability to communicate  · Depression  · Unusual sadness, discouragement and loneliness  · Talk of wanting to die  · Neglect of personal appearance   · Rebelliousness- reckless behavior  · Withdrawal from people/activities they love  · Confusion-  inability to concentrate     If you or a loved one observes any of these behaviors or has concerns about self-harm, here's what you can do:  · Talk about it- your feelings and reasons for harming yourself  · Remove any means that you might use to hurt yourself (examples: pills, rope, extension cords, firearm)  · Get professional help from the community (Mental Health, Substance Abuse, psychological counseling)  · Do not be alone:Call your Safe Contact- someone whom you trust who will be there for you.  · Call your local CRISIS HOTLINE 806-9074 or 226-283-9691  · Call your local Children's Mobile Crisis Response Team [place] (296) 157-6035 or www.[employer]  · Call the toll free National Suicide Prevention Hotlines   · National Suicide Prevention Lifeline 161-723-GXEL (5820)  · National Hope Line Network 800-SUICIDE (477-2632)

## 2019-09-14 NOTE — PROGRESS NOTES
2 Rn skin check completed.  Upper and lower back sx incisions observed, dermabonded.  All other skin intact.

## 2019-09-14 NOTE — PROGRESS NOTES
Received pt via [employer].  Pt is AAO x4.  Pt reports a 5/10 back pain level.  Understands when next pain med is due.  VS WNL.  Upper/ lower backsx incision observed, dermabonded.  Abd binder in place.   R chest port in place, CDI. Saline locked.  Pt oriented to room.  POC discussed.  All needs met at this time.  Bed in low position.  Call light within reach.  Rounding in place.

## 2019-09-14 NOTE — CARE PLAN
Problem: Safety  Goal: Will remain free from falls  Outcome: PROGRESSING AS EXPECTED  Intervention: Implement fall precautions  Flowsheets (Taken 9/14/2019 0222)  Environmental Precautions: Treaded Slipper Socks on Patient; Personal Belongings, Wastebasket, Call Bell etc. in Easy Reach; Bed in Low Position; Mobility Assessed & Appropriate Sign Placed     Problem: Pain Management  Goal: Pain level will decrease to patient's comfort goal  Outcome: PROGRESSING SLOWER THAN EXPECTED  Intervention: Follow pain managment plan developed in collaboration with patient and Interdisciplinary Team  Note:   Medicate per mar. Reassess q4h, q2h, prn

## 2019-09-14 NOTE — OR NURSING
1800: Assumed care of patient from CAROLINA Banerjee. Patient resting quietly. Respirations spontaneous and non-labored on 4L via NC per patient request. Surgical dressings to upper and lower back c/d/i with ice packs in place. Abdominal binder in place.  1810- Reports pain has not improved; see MAR.  1825- Reports pain level is tolerable. Report given to CAROLINA Thomas.

## 2019-09-14 NOTE — PROGRESS NOTES
Pt discharged to home via personal vehicle with family member. Discharge paperwork reviewed and signed. VSS. Pt escorted off unit via wheelchair with hospital staff.

## 2019-09-27 ENCOUNTER — HOSPITAL ENCOUNTER (INPATIENT)
Facility: MEDICAL CENTER | Age: 49
LOS: 2 days | DRG: 641 | End: 2019-09-30
Attending: EMERGENCY MEDICINE | Admitting: HOSPITALIST
Payer: MEDICARE

## 2019-09-27 DIAGNOSIS — E87.1 HYPONATREMIA: ICD-10-CM

## 2019-09-27 DIAGNOSIS — G89.29 CHRONIC INTRACTABLE HEADACHE, UNSPECIFIED HEADACHE TYPE: ICD-10-CM

## 2019-09-27 DIAGNOSIS — R51.9 CHRONIC INTRACTABLE HEADACHE, UNSPECIFIED HEADACHE TYPE: ICD-10-CM

## 2019-09-27 LAB
ALBUMIN SERPL BCP-MCNC: 4.5 G/DL (ref 3.2–4.9)
ALBUMIN/GLOB SERPL: 1.9 G/DL
ALP SERPL-CCNC: 98 U/L (ref 30–99)
ALT SERPL-CCNC: 27 U/L (ref 2–50)
ANION GAP SERPL CALC-SCNC: 8 MMOL/L (ref 0–11.9)
ANION GAP SERPL CALC-SCNC: 9 MMOL/L (ref 0–11.9)
AST SERPL-CCNC: 14 U/L (ref 12–45)
BASOPHILS # BLD AUTO: 0.3 % (ref 0–1.8)
BASOPHILS # BLD: 0.02 K/UL (ref 0–0.12)
BILIRUB SERPL-MCNC: 0.5 MG/DL (ref 0.1–1.5)
BUN SERPL-MCNC: 6 MG/DL (ref 8–22)
BUN SERPL-MCNC: 7 MG/DL (ref 8–22)
CALCIUM SERPL-MCNC: 8.2 MG/DL (ref 8.5–10.5)
CALCIUM SERPL-MCNC: 8.9 MG/DL (ref 8.5–10.5)
CHLORIDE SERPL-SCNC: 86 MMOL/L (ref 96–112)
CHLORIDE SERPL-SCNC: 90 MMOL/L (ref 96–112)
CO2 SERPL-SCNC: 23 MMOL/L (ref 20–33)
CO2 SERPL-SCNC: 23 MMOL/L (ref 20–33)
CREAT SERPL-MCNC: 0.62 MG/DL (ref 0.5–1.4)
CREAT SERPL-MCNC: 0.64 MG/DL (ref 0.5–1.4)
EOSINOPHIL # BLD AUTO: 0 K/UL (ref 0–0.51)
EOSINOPHIL NFR BLD: 0 % (ref 0–6.9)
ERYTHROCYTE [DISTWIDTH] IN BLOOD BY AUTOMATED COUNT: 41 FL (ref 35.9–50)
GLOBULIN SER CALC-MCNC: 2.4 G/DL (ref 1.9–3.5)
GLUCOSE SERPL-MCNC: 77 MG/DL (ref 65–99)
GLUCOSE SERPL-MCNC: 83 MG/DL (ref 65–99)
HCT VFR BLD AUTO: 37.3 % (ref 37–47)
HGB BLD-MCNC: 12.7 G/DL (ref 12–16)
IMM GRANULOCYTES # BLD AUTO: 0.04 K/UL (ref 0–0.11)
IMM GRANULOCYTES NFR BLD AUTO: 0.5 % (ref 0–0.9)
LYMPHOCYTES # BLD AUTO: 1.42 K/UL (ref 1–4.8)
LYMPHOCYTES NFR BLD: 18.4 % (ref 22–41)
MCH RBC QN AUTO: 31.2 PG (ref 27–33)
MCHC RBC AUTO-ENTMCNC: 34 G/DL (ref 33.6–35)
MCV RBC AUTO: 91.6 FL (ref 81.4–97.8)
MONOCYTES # BLD AUTO: 0.46 K/UL (ref 0–0.85)
MONOCYTES NFR BLD AUTO: 6 % (ref 0–13.4)
NEUTROPHILS # BLD AUTO: 5.77 K/UL (ref 2–7.15)
NEUTROPHILS NFR BLD: 74.8 % (ref 44–72)
NRBC # BLD AUTO: 0 K/UL
NRBC BLD-RTO: 0 /100 WBC
PLATELET # BLD AUTO: 334 K/UL (ref 164–446)
PMV BLD AUTO: 9.1 FL (ref 9–12.9)
POTASSIUM SERPL-SCNC: 3.8 MMOL/L (ref 3.6–5.5)
POTASSIUM SERPL-SCNC: 3.9 MMOL/L (ref 3.6–5.5)
PROT SERPL-MCNC: 6.9 G/DL (ref 6–8.2)
RBC # BLD AUTO: 4.07 M/UL (ref 4.2–5.4)
SODIUM SERPL-SCNC: 118 MMOL/L (ref 135–145)
SODIUM SERPL-SCNC: 121 MMOL/L (ref 135–145)
WBC # BLD AUTO: 7.7 K/UL (ref 4.8–10.8)

## 2019-09-27 PROCEDURE — 80053 COMPREHEN METABOLIC PANEL: CPT

## 2019-09-27 PROCEDURE — 99285 EMERGENCY DEPT VISIT HI MDM: CPT

## 2019-09-27 PROCEDURE — 700105 HCHG RX REV CODE 258: Performed by: EMERGENCY MEDICINE

## 2019-09-27 PROCEDURE — 96374 THER/PROPH/DIAG INJ IV PUSH: CPT

## 2019-09-27 PROCEDURE — 96375 TX/PRO/DX INJ NEW DRUG ADDON: CPT

## 2019-09-27 PROCEDURE — 80048 BASIC METABOLIC PNL TOTAL CA: CPT

## 2019-09-27 PROCEDURE — 700111 HCHG RX REV CODE 636 W/ 250 OVERRIDE (IP): Performed by: EMERGENCY MEDICINE

## 2019-09-27 PROCEDURE — 85025 COMPLETE CBC W/AUTO DIFF WBC: CPT

## 2019-09-27 RX ORDER — DIPHENHYDRAMINE HYDROCHLORIDE 50 MG/ML
50 INJECTION INTRAMUSCULAR; INTRAVENOUS ONCE
Status: COMPLETED | OUTPATIENT
Start: 2019-09-27 | End: 2019-09-27

## 2019-09-27 RX ORDER — SODIUM CHLORIDE 9 MG/ML
1000 INJECTION, SOLUTION INTRAVENOUS ONCE
Status: COMPLETED | OUTPATIENT
Start: 2019-09-27 | End: 2019-09-28

## 2019-09-27 RX ORDER — METOCLOPRAMIDE HYDROCHLORIDE 5 MG/ML
10 INJECTION INTRAMUSCULAR; INTRAVENOUS ONCE
Status: COMPLETED | OUTPATIENT
Start: 2019-09-27 | End: 2019-09-27

## 2019-09-27 RX ORDER — DEXAMETHASONE SODIUM PHOSPHATE 10 MG/ML
16 INJECTION, SOLUTION INTRAMUSCULAR; INTRAVENOUS ONCE
Status: COMPLETED | OUTPATIENT
Start: 2019-09-27 | End: 2019-09-27

## 2019-09-27 RX ADMIN — SODIUM CHLORIDE 1000 ML: 9 INJECTION, SOLUTION INTRAVENOUS at 22:21

## 2019-09-27 RX ADMIN — METOCLOPRAMIDE 10 MG: 5 INJECTION, SOLUTION INTRAMUSCULAR; INTRAVENOUS at 22:22

## 2019-09-27 RX ADMIN — DIPHENHYDRAMINE HYDROCHLORIDE 50 MG: 50 INJECTION INTRAMUSCULAR; INTRAVENOUS at 22:22

## 2019-09-27 RX ADMIN — DEXAMETHASONE SODIUM PHOSPHATE 16 MG: 10 INJECTION INTRAMUSCULAR; INTRAVENOUS at 22:22

## 2019-09-27 ASSESSMENT — LIFESTYLE VARIABLES
TOTAL SCORE: 0
CONSUMPTION TOTAL: INCOMPLETE
DO YOU DRINK ALCOHOL: NO
TOTAL SCORE: 0
HAVE PEOPLE ANNOYED YOU BY CRITICIZING YOUR DRINKING: NO
DOES PATIENT WANT TO STOP DRINKING: NO
EVER FELT BAD OR GUILTY ABOUT YOUR DRINKING: NO
HAVE YOU EVER FELT YOU SHOULD CUT DOWN ON YOUR DRINKING: NO
EVER HAD A DRINK FIRST THING IN THE MORNING TO STEADY YOUR NERVES TO GET RID OF A HANGOVER: NO
TOTAL SCORE: 0

## 2019-09-28 ENCOUNTER — APPOINTMENT (OUTPATIENT)
Dept: RADIOLOGY | Facility: MEDICAL CENTER | Age: 49
DRG: 641 | End: 2019-09-28
Attending: HOSPITALIST
Payer: MEDICARE

## 2019-09-28 PROBLEM — E87.1 HYPONATREMIA: Status: ACTIVE | Noted: 2019-09-28

## 2019-09-28 LAB
SODIUM SERPL-SCNC: 130 MMOL/L (ref 135–145)
SODIUM SERPL-SCNC: 130 MMOL/L (ref 135–145)
SODIUM SERPL-SCNC: 131 MMOL/L (ref 135–145)
VALPROATE SERPL-MCNC: 40.7 UG/ML (ref 50–100)

## 2019-09-28 PROCEDURE — 700105 HCHG RX REV CODE 258: Performed by: HOSPITALIST

## 2019-09-28 PROCEDURE — 700111 HCHG RX REV CODE 636 W/ 250 OVERRIDE (IP): Performed by: HOSPITALIST

## 2019-09-28 PROCEDURE — 700111 HCHG RX REV CODE 636 W/ 250 OVERRIDE (IP)

## 2019-09-28 PROCEDURE — 80164 ASSAY DIPROPYLACETIC ACD TOT: CPT

## 2019-09-28 PROCEDURE — 770020 HCHG ROOM/CARE - TELE (206)

## 2019-09-28 PROCEDURE — 84295 ASSAY OF SERUM SODIUM: CPT

## 2019-09-28 PROCEDURE — 700102 HCHG RX REV CODE 250 W/ 637 OVERRIDE(OP): Performed by: HOSPITALIST

## 2019-09-28 PROCEDURE — 700111 HCHG RX REV CODE 636 W/ 250 OVERRIDE (IP): Performed by: INTERNAL MEDICINE

## 2019-09-28 PROCEDURE — 99223 1ST HOSP IP/OBS HIGH 75: CPT | Performed by: HOSPITALIST

## 2019-09-28 PROCEDURE — A9270 NON-COVERED ITEM OR SERVICE: HCPCS | Performed by: HOSPITALIST

## 2019-09-28 PROCEDURE — 70450 CT HEAD/BRAIN W/O DYE: CPT

## 2019-09-28 RX ORDER — AMOXICILLIN 250 MG
2 CAPSULE ORAL 2 TIMES DAILY
Status: DISCONTINUED | OUTPATIENT
Start: 2019-09-28 | End: 2019-09-30 | Stop reason: HOSPADM

## 2019-09-28 RX ORDER — KETOROLAC TROMETHAMINE 30 MG/ML
30 INJECTION, SOLUTION INTRAMUSCULAR; INTRAVENOUS EVERY 6 HOURS PRN
Status: DISCONTINUED | OUTPATIENT
Start: 2019-09-28 | End: 2019-09-30 | Stop reason: HOSPADM

## 2019-09-28 RX ORDER — OMEPRAZOLE 20 MG/1
20 CAPSULE, DELAYED RELEASE ORAL DAILY
Status: DISCONTINUED | OUTPATIENT
Start: 2019-09-28 | End: 2019-09-30 | Stop reason: HOSPADM

## 2019-09-28 RX ORDER — SODIUM CHLORIDE 9 MG/ML
INJECTION, SOLUTION INTRAVENOUS CONTINUOUS
Status: DISCONTINUED | OUTPATIENT
Start: 2019-09-28 | End: 2019-09-28

## 2019-09-28 RX ORDER — BUSPIRONE HYDROCHLORIDE 10 MG/1
5 TABLET ORAL 2 TIMES DAILY
Status: DISCONTINUED | OUTPATIENT
Start: 2019-09-28 | End: 2019-09-30 | Stop reason: HOSPADM

## 2019-09-28 RX ORDER — HYDROMORPHONE HYDROCHLORIDE 1 MG/ML
0.5 INJECTION, SOLUTION INTRAMUSCULAR; INTRAVENOUS; SUBCUTANEOUS
Status: COMPLETED | OUTPATIENT
Start: 2019-09-28 | End: 2019-09-28

## 2019-09-28 RX ORDER — DIPHENHYDRAMINE HYDROCHLORIDE 50 MG/ML
50 INJECTION INTRAMUSCULAR; INTRAVENOUS EVERY 6 HOURS PRN
Status: DISCONTINUED | OUTPATIENT
Start: 2019-09-28 | End: 2019-09-30 | Stop reason: HOSPADM

## 2019-09-28 RX ORDER — DIPHENHYDRAMINE HYDROCHLORIDE 50 MG/ML
25 INJECTION INTRAMUSCULAR; INTRAVENOUS EVERY 6 HOURS PRN
Status: DISCONTINUED | OUTPATIENT
Start: 2019-09-28 | End: 2019-09-28

## 2019-09-28 RX ORDER — ASPIRIN 81 MG/1
81 TABLET, CHEWABLE ORAL EVERY MORNING
Status: DISCONTINUED | OUTPATIENT
Start: 2019-09-28 | End: 2019-09-30 | Stop reason: HOSPADM

## 2019-09-28 RX ORDER — DULOXETIN HYDROCHLORIDE 60 MG/1
60 CAPSULE, DELAYED RELEASE ORAL 2 TIMES DAILY
Status: DISCONTINUED | OUTPATIENT
Start: 2019-09-28 | End: 2019-09-30 | Stop reason: HOSPADM

## 2019-09-28 RX ORDER — LEVETIRACETAM 500 MG/1
1500 TABLET ORAL 3 TIMES DAILY
Status: DISCONTINUED | OUTPATIENT
Start: 2019-09-28 | End: 2019-09-30 | Stop reason: HOSPADM

## 2019-09-28 RX ORDER — OXYCODONE HYDROCHLORIDE 10 MG/1
10 TABLET ORAL
Status: DISCONTINUED | OUTPATIENT
Start: 2019-09-28 | End: 2019-09-30 | Stop reason: HOSPADM

## 2019-09-28 RX ORDER — BISACODYL 10 MG
10 SUPPOSITORY, RECTAL RECTAL
Status: DISCONTINUED | OUTPATIENT
Start: 2019-09-28 | End: 2019-09-30 | Stop reason: HOSPADM

## 2019-09-28 RX ORDER — HYDROMORPHONE HYDROCHLORIDE 1 MG/ML
INJECTION, SOLUTION INTRAMUSCULAR; INTRAVENOUS; SUBCUTANEOUS
Status: COMPLETED
Start: 2019-09-28 | End: 2019-09-28

## 2019-09-28 RX ORDER — HYDROMORPHONE HYDROCHLORIDE 1 MG/ML
0.5 INJECTION, SOLUTION INTRAMUSCULAR; INTRAVENOUS; SUBCUTANEOUS
Status: DISCONTINUED | OUTPATIENT
Start: 2019-09-28 | End: 2019-09-28

## 2019-09-28 RX ORDER — RISPERIDONE 1 MG/1
2 TABLET ORAL 2 TIMES DAILY
Status: DISCONTINUED | OUTPATIENT
Start: 2019-09-28 | End: 2019-09-30 | Stop reason: HOSPADM

## 2019-09-28 RX ORDER — GABAPENTIN 300 MG/1
600 CAPSULE ORAL 3 TIMES DAILY
Status: DISCONTINUED | OUTPATIENT
Start: 2019-09-28 | End: 2019-09-30 | Stop reason: HOSPADM

## 2019-09-28 RX ORDER — DIVALPROEX SODIUM 500 MG/1
500 TABLET, EXTENDED RELEASE ORAL 3 TIMES DAILY
Status: DISCONTINUED | OUTPATIENT
Start: 2019-09-28 | End: 2019-09-30 | Stop reason: HOSPADM

## 2019-09-28 RX ORDER — HEPARIN SODIUM 5000 [USP'U]/ML
5000 INJECTION, SOLUTION INTRAVENOUS; SUBCUTANEOUS EVERY 8 HOURS
Status: DISCONTINUED | OUTPATIENT
Start: 2019-09-28 | End: 2019-09-30 | Stop reason: HOSPADM

## 2019-09-28 RX ORDER — OXYCODONE HYDROCHLORIDE 5 MG/1
5 TABLET ORAL
Status: DISCONTINUED | OUTPATIENT
Start: 2019-09-28 | End: 2019-09-30 | Stop reason: HOSPADM

## 2019-09-28 RX ORDER — POLYETHYLENE GLYCOL 3350 17 G/17G
1 POWDER, FOR SOLUTION ORAL
Status: DISCONTINUED | OUTPATIENT
Start: 2019-09-28 | End: 2019-09-30 | Stop reason: HOSPADM

## 2019-09-28 RX ADMIN — LEVETIRACETAM 1500 MG: 500 TABLET ORAL at 12:06

## 2019-09-28 RX ADMIN — DULOXETINE HYDROCHLORIDE 60 MG: 60 CAPSULE, DELAYED RELEASE ORAL at 06:19

## 2019-09-28 RX ADMIN — HYDROMORPHONE HYDROCHLORIDE 0.5 MG: 1 INJECTION, SOLUTION INTRAMUSCULAR; INTRAVENOUS; SUBCUTANEOUS at 06:18

## 2019-09-28 RX ADMIN — SODIUM CHLORIDE: 9 INJECTION, SOLUTION INTRAVENOUS at 03:00

## 2019-09-28 RX ADMIN — GABAPENTIN 600 MG: 300 CAPSULE ORAL at 12:06

## 2019-09-28 RX ADMIN — SENNOSIDES, DOCUSATE SODIUM 2 TABLET: 50; 8.6 TABLET, FILM COATED ORAL at 06:20

## 2019-09-28 RX ADMIN — DIPHENHYDRAMINE HYDROCHLORIDE 25 MG: 50 INJECTION INTRAMUSCULAR; INTRAVENOUS at 12:06

## 2019-09-28 RX ADMIN — GABAPENTIN 600 MG: 300 CAPSULE ORAL at 17:53

## 2019-09-28 RX ADMIN — ASPIRIN 81 MG 81 MG: 81 TABLET ORAL at 06:19

## 2019-09-28 RX ADMIN — BUSPIRONE HYDROCHLORIDE 5 MG: 30 TABLET ORAL at 17:52

## 2019-09-28 RX ADMIN — DULOXETINE HYDROCHLORIDE 60 MG: 60 CAPSULE, DELAYED RELEASE ORAL at 17:53

## 2019-09-28 RX ADMIN — LEVETIRACETAM 1500 MG: 500 TABLET ORAL at 17:52

## 2019-09-28 RX ADMIN — SENNOSIDES, DOCUSATE SODIUM 2 TABLET: 50; 8.6 TABLET, FILM COATED ORAL at 17:52

## 2019-09-28 RX ADMIN — OMEPRAZOLE 20 MG: 20 CAPSULE, DELAYED RELEASE ORAL at 06:19

## 2019-09-28 RX ADMIN — HYDROMORPHONE HYDROCHLORIDE 0.5 MG: 1 INJECTION, SOLUTION INTRAMUSCULAR; INTRAVENOUS; SUBCUTANEOUS at 00:48

## 2019-09-28 RX ADMIN — BUSPIRONE HYDROCHLORIDE 5 MG: 30 TABLET ORAL at 06:19

## 2019-09-28 RX ADMIN — OXYCODONE HYDROCHLORIDE 10 MG: 10 TABLET ORAL at 03:00

## 2019-09-28 RX ADMIN — OXYCODONE HYDROCHLORIDE 10 MG: 10 TABLET ORAL at 20:54

## 2019-09-28 RX ADMIN — DIVALPROEX SODIUM 500 MG: 500 TABLET, EXTENDED RELEASE ORAL at 17:53

## 2019-09-28 RX ADMIN — DIVALPROEX SODIUM 500 MG: 500 TABLET, EXTENDED RELEASE ORAL at 06:18

## 2019-09-28 RX ADMIN — DIPHENHYDRAMINE HYDROCHLORIDE 50 MG: 50 INJECTION INTRAMUSCULAR; INTRAVENOUS at 18:02

## 2019-09-28 RX ADMIN — HEPARIN SODIUM 5000 UNITS: 5000 INJECTION INTRAVENOUS; SUBCUTANEOUS at 06:20

## 2019-09-28 RX ADMIN — HYDROMORPHONE HYDROCHLORIDE 0.5 MG: 1 INJECTION, SOLUTION INTRAMUSCULAR; INTRAVENOUS; SUBCUTANEOUS at 10:12

## 2019-09-28 RX ADMIN — DIVALPROEX SODIUM 500 MG: 500 TABLET, EXTENDED RELEASE ORAL at 12:05

## 2019-09-28 RX ADMIN — HEPARIN SODIUM 5000 UNITS: 5000 INJECTION INTRAVENOUS; SUBCUTANEOUS at 21:06

## 2019-09-28 RX ADMIN — RISPERIDONE 2 MG: 1 TABLET ORAL at 06:18

## 2019-09-28 RX ADMIN — LEVETIRACETAM 1500 MG: 500 TABLET ORAL at 06:19

## 2019-09-28 RX ADMIN — KETOROLAC TROMETHAMINE 30 MG: 30 INJECTION, SOLUTION INTRAMUSCULAR at 18:02

## 2019-09-28 RX ADMIN — KETOROLAC TROMETHAMINE 30 MG: 30 INJECTION, SOLUTION INTRAMUSCULAR at 12:06

## 2019-09-28 RX ADMIN — GABAPENTIN 600 MG: 300 CAPSULE ORAL at 06:20

## 2019-09-28 RX ADMIN — OXYCODONE HYDROCHLORIDE 10 MG: 10 TABLET ORAL at 17:52

## 2019-09-28 RX ADMIN — RISPERIDONE 2 MG: 1 TABLET ORAL at 17:53

## 2019-09-28 ASSESSMENT — ENCOUNTER SYMPTOMS
TINGLING: 0
ABDOMINAL PAIN: 0
NAUSEA: 1
VOMITING: 1
CHILLS: 0
SHORTNESS OF BREATH: 0
DIARRHEA: 0
DEPRESSION: 0
WHEEZING: 0
COUGH: 0
FOCAL WEAKNESS: 0
PHOTOPHOBIA: 0
DIZZINESS: 0
SENSORY CHANGE: 1
MYALGIAS: 0
SORE THROAT: 0
FEVER: 0
PALPITATIONS: 0
HEADACHES: 1

## 2019-09-28 ASSESSMENT — COGNITIVE AND FUNCTIONAL STATUS - GENERAL
MOBILITY SCORE: 24
SUGGESTED CMS G CODE MODIFIER MOBILITY: CH
SUGGESTED CMS G CODE MODIFIER DAILY ACTIVITY: CH
DAILY ACTIVITIY SCORE: 24

## 2019-09-28 ASSESSMENT — LIFESTYLE VARIABLES
HOW MANY TIMES IN THE PAST YEAR HAVE YOU HAD 5 OR MORE DRINKS IN A DAY: 0
EVER FELT BAD OR GUILTY ABOUT YOUR DRINKING: NO
ALCOHOL_USE: NO
CONSUMPTION TOTAL: NEGATIVE
TOTAL SCORE: 0
ON A TYPICAL DAY WHEN YOU DRINK ALCOHOL HOW MANY DRINKS DO YOU HAVE: 0
DOES PATIENT WANT TO STOP DRINKING: NO
HAVE PEOPLE ANNOYED YOU BY CRITICIZING YOUR DRINKING: NO
EVER HAD A DRINK FIRST THING IN THE MORNING TO STEADY YOUR NERVES TO GET RID OF A HANGOVER: NO
AVERAGE NUMBER OF DAYS PER WEEK YOU HAVE A DRINK CONTAINING ALCOHOL: 0
EVER_SMOKED: NEVER
TOTAL SCORE: 0
HAVE YOU EVER FELT YOU SHOULD CUT DOWN ON YOUR DRINKING: NO
TOTAL SCORE: 0

## 2019-09-28 ASSESSMENT — PATIENT HEALTH QUESTIONNAIRE - PHQ9
7. TROUBLE CONCENTRATING ON THINGS, SUCH AS READING THE NEWSPAPER OR WATCHING TELEVISION: NOT AT ALL
7. TROUBLE CONCENTRATING ON THINGS, SUCH AS READING THE NEWSPAPER OR WATCHING TELEVISION: NOT AT ALL
2. FEELING DOWN, DEPRESSED, IRRITABLE, OR HOPELESS: SEVERAL DAYS
8. MOVING OR SPEAKING SO SLOWLY THAT OTHER PEOPLE COULD HAVE NOTICED. OR THE OPPOSITE, BEING SO FIGETY OR RESTLESS THAT YOU HAVE BEEN MOVING AROUND A LOT MORE THAN USUAL: NOT AT ALL
3. TROUBLE FALLING OR STAYING ASLEEP OR SLEEPING TOO MUCH: NOT AT ALL
8. MOVING OR SPEAKING SO SLOWLY THAT OTHER PEOPLE COULD HAVE NOTICED. OR THE OPPOSITE, BEING SO FIGETY OR RESTLESS THAT YOU HAVE BEEN MOVING AROUND A LOT MORE THAN USUAL: NOT AT ALL
4. FEELING TIRED OR HAVING LITTLE ENERGY: SEVERAL DAYS
2. FEELING DOWN, DEPRESSED, IRRITABLE, OR HOPELESS: SEVERAL DAYS
1. LITTLE INTEREST OR PLEASURE IN DOING THINGS: NOT AT ALL
6. FEELING BAD ABOUT YOURSELF - OR THAT YOU ARE A FAILURE OR HAVE LET YOURSELF OR YOUR FAMILY DOWN: NOT AL ALL
9. THOUGHTS THAT YOU WOULD BE BETTER OFF DEAD, OR OF HURTING YOURSELF: NOT AT ALL
SUM OF ALL RESPONSES TO PHQ QUESTIONS 1-9: 2
1. LITTLE INTEREST OR PLEASURE IN DOING THINGS: NOT AT ALL
4. FEELING TIRED OR HAVING LITTLE ENERGY: SEVERAL DAYS
5. POOR APPETITE OR OVEREATING: NOT AT ALL
SUM OF ALL RESPONSES TO PHQ9 QUESTIONS 1 AND 2: 1
3. TROUBLE FALLING OR STAYING ASLEEP OR SLEEPING TOO MUCH: NOT AT ALL
9. THOUGHTS THAT YOU WOULD BE BETTER OFF DEAD, OR OF HURTING YOURSELF: NOT AT ALL
SUM OF ALL RESPONSES TO PHQ QUESTIONS 1-9: 2
5. POOR APPETITE OR OVEREATING: NOT AT ALL
SUM OF ALL RESPONSES TO PHQ9 QUESTIONS 1 AND 2: 1
6. FEELING BAD ABOUT YOURSELF - OR THAT YOU ARE A FAILURE OR HAVE LET YOURSELF OR YOUR FAMILY DOWN: NOT AL ALL

## 2019-09-28 ASSESSMENT — COPD QUESTIONNAIRES
HAVE YOU SMOKED AT LEAST 100 CIGARETTES IN YOUR ENTIRE LIFE: NO/DON'T KNOW
DO YOU EVER COUGH UP ANY MUCUS OR PHLEGM?: NO/ONLY WITH OCCASIONAL COLDS OR INFECTIONS
DURING THE PAST 4 WEEKS HOW MUCH DID YOU FEEL SHORT OF BREATH: NONE/LITTLE OF THE TIME
IN THE PAST 12 MONTHS DO YOU DO LESS THAN YOU USED TO BECAUSE OF YOUR BREATHING PROBLEMS: DISAGREE/UNSURE
COPD SCREENING SCORE: 0

## 2019-09-28 NOTE — ED NOTES
Inserted right upper chest port using sterile technique, initial blood return unsuccessful, blood return noted after gentle releasing pressure on rachel needle, good IV normal saline bolus noted, will continue to assess patient.

## 2019-09-28 NOTE — ED TRIAGE NOTES
"Chief Complaint   Patient presents with   • Headache     HA x 2 weeks s/p spinal stimulator placement.       /98   Pulse 98   Temp 36.4 °C (97.6 °F) (Oral)   Resp 18   Ht 1.6 m (5' 3\")   Wt 77.7 kg (171 lb 4.8 oz)   LMP 09/10/2019 (Exact Date)   SpO2 100%   BMI 30.34 kg/m²     50 y/o female presents to ED with complaint of sharp/stabbing left-sided HA, \"migraine\", onset two weeks ago after having a spinal cord stimulator placed. Patient states that this was placed to alleviate her migraines but has not helped.  She describes waxing/waning HA with associated photophobia.  She ambulated to triage with steady gait, however she states she feels generally weak and appears tremulous.  Denies any unilateral weakness or paresthesias.      Educated on triage process. Placed back in lobby. Advised to notify triage RN with any changes. Apologized for wait times.     "

## 2019-09-28 NOTE — PROGRESS NOTES
Pt transported to floor via this RN and CAROLINA Worrell. Tele box on. Monitor room notified. Pt A&Ox4. On 5L NC per pt request. Pt c/o headache and light sensitivity.  with pt at this time. Bed locked and in lowest position. Bed alarm on. Hourly rounding initiated.

## 2019-09-28 NOTE — ASSESSMENT & PLAN NOTE
Recurrent hospitalizations for the same.  She is followed by neurology and pain management   she recently had spinal stimulator placed for management  She continued to have headache despite home therapy of Toradol, Benadryl  CTH had no acute changes  Her headache is still severe. She says ketamine, Mg, and demerol has helped her in the past.  We will try these medications today and taper off prior to discharge.

## 2019-09-28 NOTE — ED NOTES
Patient resting in bed, sleeping, no signs of pain or distress, unlabored breathing noted, attached to cardiac monitor, bed in low position, call light within reach,  at bedside.

## 2019-09-28 NOTE — ED PROVIDER NOTES
ED Provider Note    CHIEF COMPLAINT  Chief Complaint   Patient presents with   • Headache     HA x 2 weeks s/p spinal stimulator placement.         HPI  Enedelia Domitila Pang is a 49 y.o. female who presents with severe left-sided headache.  Pounding in nature.  Associated with or others.  Similar however more severe than typical migraines.  Has a history of chronic headaches.  Has a spinal nerve stimulator for intractable headache control.  Had the generator replaced about 2 weeks ago.  He states that she visited with her surgeon last Tuesday and has another appointment on next Tuesday for recheck.  Denies any complications with this procedure.  No fevers.  No neck pain.  She does note some intermittent right numbness and weakness however this is not atypical for her left-sided headaches that she gets.  Notes flashing lights and auras in her field of vision.  Multiple episodes of vomiting as well.  No chest pain or trouble breathing.  No abdominal pain.    Overall, the patient states that this is rather typical for her headaches that she has had a long struggle with chronic headaches.  She has taken Toradol and Benadryl injections at home without improvement.    REVIEW OF SYSTEMS  See HPI for further details. All other systems are negative.     PAST MEDICAL HISTORY   has a past medical history of Allergy, unspecified not elsewhere classified, Anemia (10/05/2017), Anesthesia, Anxiety, Anxiety, generalized, autoimmune, Autoimmune disorder (HCC), Clostridium difficile diarrhea (2014), Depression, Elevated liver enzymes (2017), H/O Clostridium difficile infection (2014), MRSA infection (2003), Hydrocephalus (2015), Joint pain (09/10/2019), Migraine with aura, with intractable migraine, so stated, without mention of status migrainosus, MRSA (methicillin resistant Staphylococcus aureus) (08/2015), MRSA (methicillin resistant Staphylococcus aureus) (12/2017), MRSA (methicillin resistant Staphylococcus aureus) (2018), Pituitary  "abnormality (HCC) (2002), Requires supplemental oxygen, Seizure ([facility]) (started 2017), Staph infection, and Stroke ([facility]) (2007).    SOCIAL HISTORY  Social History     Tobacco Use   • Smoking status: Never Smoker   • Smokeless tobacco: Never Used   Substance and Sexual Activity   • Alcohol use: Not Currently     Frequency: Never     Binge frequency: Never   • Drug use: Never   • Sexual activity: Not on file       SURGICAL HISTORY   has a past surgical history that includes tonsillectomy (1985); other neurological surg (6692-5976); irrigation & debridement neuro (N/A, 6/28/2015); lumbar exploration (N/A, 8/31/2015); spinal cord stimulator (12/19/2016);  shunt insertion (5/31/2017); liver biopsy (07/24/2017); cholecystectomy (2001); appendectomy (1982); spinal cord stimulator (05/24/2018); spinal cord stimulator (2003); knee arthroscopy (Right, 1990); full thickness skin graft (Left, 04/2018); other surgical procedure (11/10/2017); other neurological surg (08/2015); endometrial ablation (2001); tubal coagulation laparoscopic bilateral (Bilateral, 2001); and implant neurostim/ (9/13/2019).    CURRENT MEDICATIONS  Home Medications    **Home medications have not yet been reviewed for this encounter**         ALLERGIES  Allergies   Allergen Reactions   • Sumatriptan Anaphylaxis     Historical=\"Heart stops.\" Reaction  between 1995 to 1997   • Prochlorperazine Swelling     Tongue swelling. Reaction as a teen. (compazine)  Tolerated Phenergan on 02/24/15   • Vancomycin Shortness of Breath and Rash     Reaction in 2005.  D/W patient 8/31/15 - tolerated loading dose of vancomycin administered on 8/30/15 with some itching to chest with decreased infusion rate. Red Man Syndrome.  2018-tolerated slow drip with benadryl pre-med-had no problems.       PHYSICAL EXAM  VITAL SIGNS: /98   Pulse 98   Temp 36.4 °C (97.6 °F) (Oral)   Resp 18   Ht 1.6 m (5' 3\")   Wt 77.7 kg (171 lb 4.8 oz)   LMP 09/10/2019 (Exact Date)  "  SpO2 100%   BMI 30.34 kg/m²   Pulse ox interpretation: I interpret this pulse ox as normal.  Constitutional: Alert in no apparent distress.  HENT: No signs of trauma, Bilateral external ears normal, Nose normal.   Eyes: Pupils are equal and reactive, Conjunctiva normal, Non-icteric.   Neck: Normal range of motion, No tenderness, Supple, No stridor.   Cardiovascular: Regular rate and rhythm.   Thorax & Lungs: Normal breath sounds, No respiratory distress, No wheezing, No chest tenderness.   Abdomen: Bowel sounds normal, Soft, No tenderness, No masses, No pulsatile masses. No peritoneal signs.  Skin: Warm, Dry, No erythema, No rash.  Spinal nerve stimulator in the left hip region without surrounding erythema.  Surgical wound is healing well.  Surgical sites in the upper back midline without surrounding erythema.  Faint swelling to this area.  Surgical scar appears to be well-healing without evidence of dehiscence or discharge.  Back: No bony tenderness, No CVA tenderness.   Extremities: Intact distal pulses, No edema, No tenderness, No cyanosis  Musculoskeletal: Good range of motion in all major joints. No tenderness to palpation or major deformities noted.   Neurologic: Alert, Normal motor function and gait, Normal sensory function, No focal deficits noted.       DIAGNOSTIC STUDIES / PROCEDURES    EKG - Physician interpretation      LABS  Labs Reviewed   CBC WITH DIFFERENTIAL - Abnormal; Notable for the following components:       Result Value    RBC 4.07 (*)     Neutrophils-Polys 74.80 (*)     Lymphocytes 18.40 (*)     All other components within normal limits   COMP METABOLIC PANEL - Abnormal; Notable for the following components:    Sodium 118 (*)     Chloride 86 (*)     Bun 7 (*)     All other components within normal limits   BASIC METABOLIC PANEL - Abnormal; Notable for the following components:    Sodium 121 (*)     Chloride 90 (*)     Bun 6 (*)     Calcium 8.2 (*)     All other components within normal  limits   SODIUM SERUM (NA) - Abnormal; Notable for the following components:    Sodium 130 (*)     All other components within normal limits   ESTIMATED GFR   ESTIMATED GFR   URINALYSIS   SODIUM SERUM (NA)   OSMOLALITY SERUM   OSMOLALITY URINE   URINE SODIUM RANDOM       COURSE & MEDICAL DECISION MAKING    Medications   NS infusion 1,000 mL (has no administration in time range)   dexamethasone pf (DECADRON) injection 16 mg (has no administration in time range)   metoclopramide (REGLAN) injection 10 mg (has no administration in time range)   diphenhydrAMINE (BENADRYL) injection 50 mg (has no administration in time range)       Pertinent Labs & Imaging studies reviewed. (See chart for details)  49 y.o. female with a history of chronic intractable headaches/migraines with a spinal nerve stimulator, presenting with intractable headache for the past 2 weeks since before her recent surgery to replace a spinal nerve stimulator.  Had it replaced due to a prior infection.  Had the original generator removed for 4 months to allow healing prior to reinstallation.  Currently has a left-sided headache with associated nausea and vomiting.  Not responding to Benadryl and Toradol injections at home.  Slight neurologic deficit on the right side which the patient states is normal for her with left-sided headaches in the past.  Likely consistent with a complex migraine.  It appears that the patient has a long-standing struggle with headache control.  She states that she typically improves with Reglan, Benadryl, IV fluids, Decadron.    She has a port due to poor IV access.  Port was accessed and the patient was given migraine therapy.    Patient reports no improvement despite treatment.  Laboratory studies showed significant hyponatremia with critical sodium of 118.  Hyponatremia may contribute to the patient's headache symptoms.  Given the severe degree of the hyponatremia, the patient will be admitted to the hospital for further  "management.    As of this time, unclear as to the etiology of the patient's hyponatremia.  May be related to medication use however no new medications recently.  To rule out possible diabetes insipidus versus SIADH.  Urine and serum osmolarity was ordered.    Spoke with the hospitalist, Dr. Jones, who agrees to the admission.      /98   Pulse 98   Temp 36.4 °C (97.6 °F) (Oral)   Resp 18   Ht 1.6 m (5' 3\")   Wt 77.7 kg (171 lb 4.8 oz)   LMP 09/10/2019 (Exact Date)   SpO2 100%   BMI 30.34 kg/m²       FINAL IMPRESSION  1. Chronic intractable headache, unspecified headache type    2. Hyponatremia            Electronically signed by: Eric Smith, 9/27/2019 9:55 PM    "

## 2019-09-28 NOTE — PROGRESS NOTES
RN skin check completed with CAROLINA Worrell  Devices in place: Tele box and Silicone nasal cannula  Skin assessed under devices: yes  Confirmed pressure ulcers found on: na  Ears: pink and blanching. Pt wears glasses  Elbows: clean, dry, intact  Sacrum: pink and blanching   Feet and Heels: clean, dry, intact    Pt has scabbed over incision from spinal surgery 2 weeks ago. Mepilex placed for comfort. Pt also has scars on lower back from previous spinal and back surgeries and port in place on right chest.

## 2019-09-28 NOTE — PROGRESS NOTES
Patient was seen at bedside and chart was reviewed. Please see Dr. Jones's note for further details.  48 yo woman with hydrocephalus with lumbar shunt, CVA, seizure, poorly controlled migraines who had spinal stimulator placed 2 weeks ago. She presented with uncontrolled headache. She was found with hyponatremia 118 and admitted for dehydration.   Sodium increased to 130. Stop IVF.  I discussed with her stopping IV Dilaudid indefinitely, patient agrees after 1 more dose.  CT head is unremarkable, ventricles are minimally enlarged, shunt in place.

## 2019-09-28 NOTE — H&P
Hospital Medicine History & Physical Note    Date of Service  9/28/2019    Primary Care Physician  Elliott Power M.D.    Consultants  None    Code Status  Full    Chief Complaint  Chief Complaint   Patient presents with   • Headache     HA x 2 weeks s/p spinal stimulator placement.         History of Presenting Illness  49 y.o. female who presented on 9/27/2019 with intractable headache.  This is a 49-year-old female with a history of migraine headaches that are chronically poorly controlled.  She reports that she underwent a spinal stimulator 2 weeks ago as an attempt to better control her chronic issues.  Unfortunately, since then, she feels that her headache has gradually worsened.  She describes a left frontal and temporal headache which is throbbing, chronic, and associated with a right-sided facial stabbing.  She reports that she carries a history of complex migraines and that these type of neurologic issues have been persistent for her.  She has been nauseated all day long and has been having persistent emesis.  She typically does keep well-hydrated but has been unable to today.  Additionally, she reports photophobia and hyperosmia which is not new for her.  Otherwise she has had no fevers, chills, chest pain, shortness breath, abdominal pain, diarrhea or dysuria.  The patient has a complex medical regimen at home where she takes injected Toradol and Benadryl however these have been ineffective for her.    Review of Systems  Review of Systems   Constitutional: Negative for chills and fever.   HENT: Negative for congestion and sore throat.    Eyes: Negative for photophobia.   Respiratory: Negative for cough, shortness of breath and wheezing.    Cardiovascular: Negative for chest pain and palpitations.   Gastrointestinal: Positive for nausea and vomiting. Negative for abdominal pain and diarrhea.   Genitourinary: Negative for dysuria.   Musculoskeletal: Negative for myalgias.   Skin: Negative.     Neurological: Positive for sensory change (Sensation of stabbing along the right face) and headaches. Negative for dizziness, tingling and focal weakness.        Photophobia, smell sensitivity   Psychiatric/Behavioral: Negative for depression and suicidal ideas.       Past Medical History  Past Medical History:   Diagnosis Date   • Joint pain 09/10/2019    Especially right shoulder   • MRSA (methicillin resistant Staphylococcus aureus) 2018    to neurostimulator.    • MRSA (methicillin resistant Staphylococcus aureus) 12/2017    left foot autoimmune decay   • Anemia 10/05/2017    Unsure if a current issue.   • Elevated liver enzymes 2017    Related to meds.   • MRSA (methicillin resistant Staphylococcus aureus) 08/2015    to lumbar shunt and power port   • Hydrocephalus 2015    with lumbar shunt   • H/O Clostridium difficile infection 2014   • Clostridium difficile diarrhea 2014    follow up tests negative   • Stroke (HCC) 2007     with right side residual weakness   • Hx MRSA infection 2003    with neurostimulator implant   • Pituitary abnormality (HCC) 2002   • Allergy, unspecified not elsewhere classified    • Anesthesia     Migrane after anesthesia   • Anxiety    • Anxiety, generalized    • autoimmune    • Autoimmune disorder (HCC)     Unknown etiology or type   • Depression    • Migraine with aura, with intractable migraine, so stated, without mention of status migrainosus     from undefined autoimmune issue   • Requires supplemental oxygen     to treat migraines. 2-5L/NC   • Seizure (HCC) started 2017    Unknown etiology-not sure if related to autoimmune issue. Last seizure 2/2019   • Staph infection        Surgical History  Past Surgical History:   Procedure Laterality Date   • PB IMPLANT NEUROSTIM/  9/13/2019    Procedure: INSERTION, NEUROSTIMULATOR, PERMANENT, SPINAL CORD;  Surgeon: Seven Mahoney M.D.;  Location: SURGERY [facility];  Service: Pain Management   • SPINAL CORD STIMULATOR   "2018    Explanted   • FULL THICKNESS SKIN GRAFT Left 2018     graft to foot (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   • OTHER SURGICAL PROCEDURE  11/10/2017    Power port   • LIVER BIOPSY  2017   •  SHUNT INSERTION  2017    Procedure:  SHUNT INSERTION;  Surgeon: Drew Berry M.D.;  Location: SURGERY [facility];  Service:    • SPINAL CORD STIMULATOR  2016    Procedure: SPINAL CORD STIMULATOR FOR: PLACEMENT OF LEFT FLANK OCCIPITAL NERVE STIMULATOR BATTERY PACK;  Surgeon: Oli Oh III, M.D.;  Location: SURGERY [facility];  Service:    • LUMBAR EXPLORATION N/A 2015    Procedure: LUMBAR EXPLORATION- Lumbar shunt removal ;  Surgeon: Oli Oh III, M.D.;  Location: SURGERY [facility];  Service:    • OTHER NEUROLOGICAL SURG  2015    Explanted lumbar shunt and explanted power port due to MRSA   • IRRIGATION & DEBRIDEMENT NEURO N/A 2015    Procedure: IRRIGATION & DEBRIDEMENT NEURO;  Surgeon: Oli Oh III, M.D.;  Location: SURGERY [facility];  Service:    • SPINAL CORD STIMULATOR      1st implant   • CHOLECYSTECTOMY      had ruptured day before   • ENDOMETRIAL ABLATION     • TUBAL COAGULATION LAPAROSCOPIC BILATERAL Bilateral    • KNEE ARTHROSCOPY Right    • TONSILLECTOMY     • APPENDECTOMY     • OTHER NEUROLOGICAL SURG  [id number]    occipital nerve stimulator-revisions for total of 9 procedures       Family History  Family History   Problem Relation Age of Onset   • No Known Problems Mother    • No Known Problems Father        Social History  Social History     Tobacco Use   • Smoking status: Never Smoker   • Smokeless tobacco: Never Used   Substance Use Topics   • Alcohol use: Not Currently     Frequency: Never     Binge frequency: Never   • Drug use: Never       Allergies  Allergies   Allergen Reactions   • Sumatriptan Anaphylaxis     Historical=\"Heart stops.\" Reaction  between  to  "   • Prochlorperazine Swelling     Tongue swelling. Reaction as a teen. (compazine)  Tolerated Phenergan on 02/24/15   • Vancomycin Shortness of Breath and Rash     Reaction in 2005.  D/W patient 8/31/15 - tolerated loading dose of vancomycin administered on 8/30/15 with some itching to chest with decreased infusion rate. Red Man Syndrome.  2018-tolerated slow drip with benadryl pre-med-had no problems.       Medications  No current facility-administered medications on file prior to encounter.      Current Outpatient Medications on File Prior to Encounter   Medication Sig Dispense Refill   • mupirocin (BACTROBAN) 2 % Ointment Apply  to affected area(s) 3 times a day. 5 day use prior to surgery on 9/13/19     • omeprazole (PRILOSEC) 20 MG delayed-release capsule Take 20 mg by mouth every day.     • Multiple Vitamins-Minerals (MULTIVITAMIN ADULT) Tab Take 1 Tab by mouth every day.     • Valerian 500 MG Cap Take 1 Cap by mouth 3 times a day as needed. Indications: Feeling Anxious     • busPIRone (BUSPAR) 5 MG tablet Take 5 mg by mouth 2 times a day.     • EMGALITY 120 MG/ML Solution Auto-injector Inject 120 mg as instructed Q30 DAYS.     • levETIRAcetam (KEPPRA) 500 MG Tab Take 1,500 mg by mouth 3 times a day.     • risperiDONE (RISPERDAL) 2 MG Tab Take 2 mg by mouth 2 times a day.     • ketorolac (TORADOL) 60 MG/2ML Solution 60 mg by Intramuscular route as needed. Indications: Migraine Headache     • divalproex ER (DEPAKOTE ER) 500 MG TABLET SR 24 HR Take 500 mg by mouth 3 times a day.     • diphenhydrAMINE (BENADRYL) 50 MG/ML Solution 50 mg by Intramuscular route every 6 hours as needed (Migraines). Indications: migraine     • aspirin (ASA) 81 MG Chew Tab chewable tablet Take 81 mg by mouth every morning.     • gabapentin (NEURONTIN) 600 MG tablet Take 600 mg by mouth 3 times a day.     • duloxetine (CYMBALTA) 60 MG CPEP delayed-release capsule Take 60 mg by mouth 2 times a day.         Physical  Exam  Hemodynamics  Temp (24hrs), Av.4 °C (97.6 °F), Min:36.4 °C (97.6 °F), Max:36.4 °C (97.6 °F)   Temperature: 36.4 °C (97.6 °F)  Pulse  Av.3  Min: 70  Max: 113    Blood Pressure: 132/79      Respiratory      Respiration: 16, Pulse Oximetry: 100 %             Physical Exam   Constitutional: She is oriented to person, place, and time. No distress.   Chronically ill-appearing   HENT:   Head: Normocephalic and atraumatic.   Right Ear: External ear normal.   Left Ear: External ear normal.   Eyes: EOM are normal. Right eye exhibits no discharge. Left eye exhibits no discharge.   Neck: Neck supple. No JVD present.   Cardiovascular: Normal rate, regular rhythm and normal heart sounds.   Pulmonary/Chest: Effort normal and breath sounds normal. No respiratory distress. She exhibits no tenderness.   Abdominal: Soft. Bowel sounds are normal. She exhibits no distension. There is no tenderness.   Musculoskeletal: Normal range of motion. She exhibits no edema.   Neurological: She is alert and oriented to person, place, and time. No cranial nerve deficit.   Skin: Skin is warm and dry. She is not diaphoretic. No erythema.   Psychiatric: She has a normal mood and affect. Her behavior is normal.   Nursing note and vitals reviewed.    Capillary refill less than 3 seconds, distal pulses intact    Laboratory:  Recent Labs     19   WBC 7.7   RBC 4.07*   HEMOGLOBIN 12.7   HEMATOCRIT 37.3   MCV 91.6   MCH 31.2   MCHC 34.0   RDW 41.0   PLATELETCT 334   MPV 9.1     Recent Labs     19   SODIUM 118* 121*   POTASSIUM 3.9 3.8   CHLORIDE 86* 90*   CO2 23 23   GLUCOSE 77 83   BUN 7* 6*   CREATININE 0.64 0.62   CALCIUM 8.9 8.2*     Recent Labs     19   ALTSGPT 27  --    ASTSGOT 14  --    ALKPHOSPHAT 98  --    TBILIRUBIN 0.5  --    GLUCOSE 77 83                 Lab Results   Component Value Date    TROPONINI <0.01 2018       Imaging  Ct-head W/o    Result Date:  8/31/2019 8/31/2019 2:13 AM HISTORY/REASON FOR EXAM: Headache, chronic, with new features TECHNIQUE/EXAM DESCRIPTION:  CT of the head without contrast. Sequential axial images were obtained from the vertex to the skull base without contrast. Up to date radiation dose reduction adjustments have been utilized to meet ALARA standards for radiation dose reduction. COMPARISON:  January 18, 2019 FINDINGS: The brain appears normal in volume and morphology. Right frontal approach ventriculoperitoneal shunt is seen with tip terminating in the left lateral ventricle. The ventricles are normal in caliber and configuration. No space occupying lesions or areas of acute vascular territory infarctions are identified. There are no abnormal extra axial fluid collections or extra axial hemorrhage identified. The visualized paranasal sinuses and mastoid air cells are well aerated bilaterally. No depressed calvarial fractures are identified. The visualized globes and retrobulbar soft tissues appear within normal limits.     1.  No acute intracranial abnormality. 2.  Ventriculoperitoneal shunt, bilateral ventricles appear stable in size compared to prior study.    Dx-lumbar Spine-2 Or 3 Views    Result Date: 9/13/2019 9/13/2019 3:13 PM HISTORY/REASON FOR EXAM:  Intraoperative evaluation TECHNIQUE/ EXAM DESCRIPTION AND NUMBER OF VIEWS:  3 views of the lumbar spine. COMPARISON: None. FINDINGS: 3 images were obtained in the operating room. Neurostimulator electrodes project over the cervical spine. A total of 110.9 seconds of fluoroscopy time utilized.     Intraoperative image as above described.    Dx-portable Fluoro > 1 Hour    Result Date: 9/16/2019 9/13/2019 3:13 PM HISTORY/REASON FOR EXAM:  Spinal stimulator placement TECHNIQUE/EXAM DESCRIPTION AND NUMBER OF VIEWS: Portable fluoroscopy for greater than one hour in OR. FINDINGS: The portable fluoroscopy unit was obligated to the procedure for greater than one hour. Actual fluoro  time was 110.9 seconds.     Portable fluoroscopy utilized for 110.9 seconds. INTERPRETING LOCATION: [address] [place] [address] [place], [id number]        Assessment/Plan:  Anticipate that patient will need greater than 2 midnights for management of the discussed medical issues.    * Hyponatremia  Assessment & Plan  Patient was given a fluid bolus in the emergency room with a quick increase in her sodium levels from 118-121.  She does report vomiting all day long therefore dehydration may be playing a role in her sodium level.  I will resume normal saline at a low rate and plan to monitor every 4 hour sodium checks to assess for slow improvement.  Patient does have intractable migraine headache which is not new for her but otherwise has no focal deficits and no mental status changes.  Serum and urine osmolality levels are pending.    Intractable migraine- (present on admission)  Assessment & Plan  Patient has failed her usual home therapy, she also failed an attempted abortive migraine therapy in the emergency room with Decadron, Reglan, and Benadryl.  At this point, she will be admitted to the hospital for correction of her hyponatremia as this may be contributing.  She will receive symptomatic management for her pain and I will continue her home Keppra, Depakote, and gabapentin.  We will attempt to wean her quickly off of IV narcotic therapy as review of the medical records indicates that she does have a history of drug-seeking behavior.  If her sodium levels correct and she remains with intractable migraine, we will also consider a neurology consultation.  Of note, she does carry a history of hydrocephalus, I will check a CT scan of the head.    Depression- (present on admission)  Assessment & Plan  This is chronic, continue home BuSpar, risperidone, and Cymbalta.      Prophylaxis: Heparin for DVT prophylaxis, no PPI indicated, bowel protocol as needed

## 2019-09-29 LAB
ANION GAP SERPL CALC-SCNC: 7 MMOL/L (ref 0–11.9)
BUN SERPL-MCNC: 9 MG/DL (ref 8–22)
CALCIUM SERPL-MCNC: 8.6 MG/DL (ref 8.5–10.5)
CHLORIDE SERPL-SCNC: 107 MMOL/L (ref 96–112)
CO2 SERPL-SCNC: 22 MMOL/L (ref 20–33)
CREAT SERPL-MCNC: 0.59 MG/DL (ref 0.5–1.4)
ERYTHROCYTE [DISTWIDTH] IN BLOOD BY AUTOMATED COUNT: 46.8 FL (ref 35.9–50)
GLUCOSE SERPL-MCNC: 106 MG/DL (ref 65–99)
HCT VFR BLD AUTO: 37 % (ref 37–47)
HGB BLD-MCNC: 11.8 G/DL (ref 12–16)
MCH RBC QN AUTO: 31.5 PG (ref 27–33)
MCHC RBC AUTO-ENTMCNC: 31.9 G/DL (ref 33.6–35)
MCV RBC AUTO: 98.7 FL (ref 81.4–97.8)
PLATELET # BLD AUTO: 300 K/UL (ref 164–446)
PMV BLD AUTO: 9.3 FL (ref 9–12.9)
POTASSIUM SERPL-SCNC: 4 MMOL/L (ref 3.6–5.5)
RBC # BLD AUTO: 3.75 M/UL (ref 4.2–5.4)
SODIUM SERPL-SCNC: 136 MMOL/L (ref 135–145)
SODIUM SERPL-SCNC: 137 MMOL/L (ref 135–145)
WBC # BLD AUTO: 7.9 K/UL (ref 4.8–10.8)

## 2019-09-29 PROCEDURE — 80048 BASIC METABOLIC PNL TOTAL CA: CPT

## 2019-09-29 PROCEDURE — 770020 HCHG ROOM/CARE - TELE (206)

## 2019-09-29 PROCEDURE — 700101 HCHG RX REV CODE 250: Performed by: INTERNAL MEDICINE

## 2019-09-29 PROCEDURE — 700102 HCHG RX REV CODE 250 W/ 637 OVERRIDE(OP): Performed by: HOSPITALIST

## 2019-09-29 PROCEDURE — 700111 HCHG RX REV CODE 636 W/ 250 OVERRIDE (IP): Performed by: INTERNAL MEDICINE

## 2019-09-29 PROCEDURE — A9270 NON-COVERED ITEM OR SERVICE: HCPCS | Performed by: HOSPITALIST

## 2019-09-29 PROCEDURE — 700111 HCHG RX REV CODE 636 W/ 250 OVERRIDE (IP): Performed by: HOSPITALIST

## 2019-09-29 PROCEDURE — 84295 ASSAY OF SERUM SODIUM: CPT

## 2019-09-29 PROCEDURE — 85027 COMPLETE CBC AUTOMATED: CPT

## 2019-09-29 PROCEDURE — 99233 SBSQ HOSP IP/OBS HIGH 50: CPT | Performed by: INTERNAL MEDICINE

## 2019-09-29 RX ORDER — ACETAMINOPHEN 325 MG/1
650 TABLET ORAL EVERY 4 HOURS PRN
Status: DISCONTINUED | OUTPATIENT
Start: 2019-09-29 | End: 2019-09-30 | Stop reason: HOSPADM

## 2019-09-29 RX ORDER — HYDROXYZINE HYDROCHLORIDE 10 MG/1
10 TABLET, FILM COATED ORAL 3 TIMES DAILY PRN
Status: DISCONTINUED | OUTPATIENT
Start: 2019-09-29 | End: 2019-09-30 | Stop reason: HOSPADM

## 2019-09-29 RX ORDER — MEPERIDINE HYDROCHLORIDE 50 MG/ML
25 INJECTION INTRAMUSCULAR; INTRAVENOUS; SUBCUTANEOUS EVERY 4 HOURS PRN
Status: DISCONTINUED | OUTPATIENT
Start: 2019-09-29 | End: 2019-09-29

## 2019-09-29 RX ORDER — MEPERIDINE HYDROCHLORIDE 25 MG/ML
25 INJECTION INTRAMUSCULAR; INTRAVENOUS; SUBCUTANEOUS EVERY 4 HOURS PRN
Status: DISCONTINUED | OUTPATIENT
Start: 2019-09-29 | End: 2019-09-30 | Stop reason: HOSPADM

## 2019-09-29 RX ORDER — MAGNESIUM SULFATE HEPTAHYDRATE 40 MG/ML
2 INJECTION, SOLUTION INTRAVENOUS ONCE
Status: COMPLETED | OUTPATIENT
Start: 2019-09-29 | End: 2019-09-29

## 2019-09-29 RX ADMIN — DIPHENHYDRAMINE HYDROCHLORIDE 50 MG: 50 INJECTION INTRAMUSCULAR; INTRAVENOUS at 13:07

## 2019-09-29 RX ADMIN — GABAPENTIN 600 MG: 300 CAPSULE ORAL at 06:37

## 2019-09-29 RX ADMIN — OXYCODONE HYDROCHLORIDE 10 MG: 10 TABLET ORAL at 03:35

## 2019-09-29 RX ADMIN — OXYCODONE HYDROCHLORIDE 10 MG: 10 TABLET ORAL at 00:25

## 2019-09-29 RX ADMIN — DIPHENHYDRAMINE HYDROCHLORIDE 50 MG: 50 INJECTION INTRAMUSCULAR; INTRAVENOUS at 00:11

## 2019-09-29 RX ADMIN — SENNOSIDES, DOCUSATE SODIUM 2 TABLET: 50; 8.6 TABLET, FILM COATED ORAL at 06:37

## 2019-09-29 RX ADMIN — KETOROLAC TROMETHAMINE 30 MG: 30 INJECTION, SOLUTION INTRAMUSCULAR at 06:27

## 2019-09-29 RX ADMIN — LEVETIRACETAM 1500 MG: 500 TABLET ORAL at 06:39

## 2019-09-29 RX ADMIN — HEPARIN SODIUM 5000 UNITS: 5000 INJECTION INTRAVENOUS; SUBCUTANEOUS at 14:11

## 2019-09-29 RX ADMIN — MEPERIDINE HYDROCHLORIDE 25 MG: 50 INJECTION INTRAMUSCULAR; INTRAVENOUS; SUBCUTANEOUS at 20:08

## 2019-09-29 RX ADMIN — KETOROLAC TROMETHAMINE 30 MG: 30 INJECTION, SOLUTION INTRAMUSCULAR at 00:11

## 2019-09-29 RX ADMIN — KETOROLAC TROMETHAMINE 30 MG: 30 INJECTION, SOLUTION INTRAMUSCULAR at 20:30

## 2019-09-29 RX ADMIN — DULOXETINE HYDROCHLORIDE 60 MG: 60 CAPSULE, DELAYED RELEASE ORAL at 06:00

## 2019-09-29 RX ADMIN — GABAPENTIN 600 MG: 300 CAPSULE ORAL at 18:31

## 2019-09-29 RX ADMIN — HEPARIN SODIUM 5000 UNITS: 5000 INJECTION INTRAVENOUS; SUBCUTANEOUS at 06:40

## 2019-09-29 RX ADMIN — DIVALPROEX SODIUM 500 MG: 500 TABLET, EXTENDED RELEASE ORAL at 13:00

## 2019-09-29 RX ADMIN — LEVETIRACETAM 1500 MG: 500 TABLET ORAL at 13:01

## 2019-09-29 RX ADMIN — KETOROLAC TROMETHAMINE 30 MG: 30 INJECTION, SOLUTION INTRAMUSCULAR at 13:16

## 2019-09-29 RX ADMIN — GABAPENTIN 600 MG: 300 CAPSULE ORAL at 13:01

## 2019-09-29 RX ADMIN — ASPIRIN 81 MG 81 MG: 81 TABLET ORAL at 06:00

## 2019-09-29 RX ADMIN — OXYCODONE HYDROCHLORIDE 10 MG: 10 TABLET ORAL at 06:37

## 2019-09-29 RX ADMIN — BUSPIRONE HYDROCHLORIDE 5 MG: 30 TABLET ORAL at 06:38

## 2019-09-29 RX ADMIN — HEPARIN SODIUM 5000 UNITS: 5000 INJECTION INTRAVENOUS; SUBCUTANEOUS at 22:48

## 2019-09-29 RX ADMIN — RISPERIDONE 2 MG: 1 TABLET ORAL at 06:39

## 2019-09-29 RX ADMIN — DIPHENHYDRAMINE HYDROCHLORIDE 50 MG: 50 INJECTION INTRAMUSCULAR; INTRAVENOUS at 20:09

## 2019-09-29 RX ADMIN — OMEPRAZOLE 20 MG: 20 CAPSULE, DELAYED RELEASE ORAL at 06:00

## 2019-09-29 RX ADMIN — MEPERIDINE HYDROCHLORIDE 25 MG: 50 INJECTION INTRAMUSCULAR; INTRAVENOUS; SUBCUTANEOUS at 14:10

## 2019-09-29 RX ADMIN — DULOXETINE HYDROCHLORIDE 60 MG: 60 CAPSULE, DELAYED RELEASE ORAL at 18:31

## 2019-09-29 RX ADMIN — MAGNESIUM SULFATE IN WATER 2 G: 40 INJECTION, SOLUTION INTRAVENOUS at 11:59

## 2019-09-29 RX ADMIN — DIPHENHYDRAMINE HYDROCHLORIDE 50 MG: 50 INJECTION INTRAMUSCULAR; INTRAVENOUS at 06:27

## 2019-09-29 RX ADMIN — SENNOSIDES, DOCUSATE SODIUM 2 TABLET: 50; 8.6 TABLET, FILM COATED ORAL at 18:31

## 2019-09-29 RX ADMIN — LEVETIRACETAM 1500 MG: 500 TABLET ORAL at 18:31

## 2019-09-29 RX ADMIN — BUSPIRONE HYDROCHLORIDE 5 MG: 30 TABLET ORAL at 18:32

## 2019-09-29 RX ADMIN — DIVALPROEX SODIUM 500 MG: 500 TABLET, EXTENDED RELEASE ORAL at 18:32

## 2019-09-29 RX ADMIN — KETAMINE HYDROCHLORIDE 25 MG: 10 INJECTION, SOLUTION INTRAMUSCULAR; INTRAVENOUS at 11:45

## 2019-09-29 RX ADMIN — RISPERIDONE 2 MG: 1 TABLET ORAL at 18:31

## 2019-09-29 RX ADMIN — DIVALPROEX SODIUM 500 MG: 500 TABLET, EXTENDED RELEASE ORAL at 06:38

## 2019-09-29 ASSESSMENT — PAIN SCALES - WONG BAKER: WONGBAKER_NUMERICALRESPONSE: HURTS A WHOLE LOT

## 2019-09-29 ASSESSMENT — ENCOUNTER SYMPTOMS
ABDOMINAL PAIN: 0
VOMITING: 0
NECK PAIN: 0
HEADACHES: 1
SHORTNESS OF BREATH: 0
PHOTOPHOBIA: 1

## 2019-09-29 NOTE — PROGRESS NOTES
Hospital Medicine Daily Progress Note    Date of Service  9/29/2019    Chief Complaint  49 y.o. female admitted 9/27/2019 with headache.    Hospital Course    50 yo woman with hydrocephalus with lumbar shunt, CVA, seizure, poorly controlled migraines resulting in recurrent hospitalizations, drug seeking behavior who had spinal stimulator placed 2 weeks ago. She presented with uncontrolled headache. She was found with hyponatremia 118 and admitted for dehydration.       Interval Problem Update  Sodium is stable. Headache is still severe. She says ketamine, Mg, and demerol has helped her in the past. She is eating fairly. No N/V.    Consultants/Specialty  None    Code Status  Full Code      Disposition  Pending pain control    Review of Systems  Review of Systems   Constitutional: Positive for malaise/fatigue.   Eyes: Positive for photophobia.   Respiratory: Negative for shortness of breath.    Cardiovascular: Negative for chest pain.   Gastrointestinal: Negative for abdominal pain and vomiting.   Genitourinary: Negative for dysuria.   Musculoskeletal: Negative for neck pain.   Neurological: Positive for headaches.        Physical Exam  Temp:  [36.2 °C (97.2 °F)-36.7 °C (98.1 °F)] 36.7 °C (98.1 °F)  Pulse:  [84-98] 85  Resp:  [16-18] 17  BP: ()/(64-84) 99/67  SpO2:  [98 %-100 %] 99 %    Physical Exam   Constitutional: She is oriented to person, place, and time. She appears well-developed and well-nourished. She is cooperative.   HENT:   Head: Normocephalic and atraumatic.   Eyes: Pupils are equal, round, and reactive to light. Conjunctivae and EOM are normal.   Neck: Neck supple.   Cardiovascular: Normal rate and regular rhythm.   Pulmonary/Chest: Effort normal. She has no wheezes.   Chest port in place   Abdominal: Soft. There is no tenderness.   Musculoskeletal: She exhibits no edema.   Neurological: She is alert and oriented to person, place, and time.   Skin: Skin is warm and dry.   Nursing note and vitals  reviewed.      Fluids    Intake/Output Summary (Last 24 hours) at 9/29/2019 1557  Last data filed at 9/29/2019 0900  Gross per 24 hour   Intake 960 ml   Output --   Net 960 ml       Laboratory  Recent Labs     09/27/19 2220 09/29/19 0224   WBC 7.7 7.9   RBC 4.07* 3.75*   HEMOGLOBIN 12.7 11.8*   HEMATOCRIT 37.3 37.0   MCV 91.6 98.7*   MCH 31.2 31.5   MCHC 34.0 31.9*   RDW 41.0 46.8   PLATELETCT 334 300   MPV 9.1 9.3     Recent Labs     09/27/19 2220 09/27/19 2328  09/28/19  1800 09/29/19 0224 09/29/19  1128   SODIUM 118* 121*   < > 130* 136 137   POTASSIUM 3.9 3.8  --   --  4.0  --    CHLORIDE 86* 90*  --   --  107  --    CO2 23 23  --   --  22  --    GLUCOSE 77 83  --   --  106*  --    BUN 7* 6*  --   --  9  --    CREATININE 0.64 0.62  --   --  0.59  --    CALCIUM 8.9 8.2*  --   --  8.6  --     < > = values in this interval not displayed.                   Imaging  CT-HEAD W/O   Final Result      1.  No evidence of acute intracranial process.      2.  Right-sided ventricular shunt tube in place. No intraparenchymal or intraventricular hemorrhage.           Assessment/Plan  * Hyponatremia  Assessment & Plan  Due to dehydration  Sodium normalized with IV fluids, stopped IV fluids  Monitor BMP    Intractable migraine- (present on admission)  Assessment & Plan  Recurrent hospitalizations for the same.  She is followed by neurology and pain management   she recently had spinal stimulator placed for management  She continued to have headache despite home therapy of Toradol, Benadryl  CT had no acute changes  Her headache is still severe. She says ketamine, Mg, and demerol has helped her in the past.  We will try these medications today and taper off prior to discharge.    Depression- (present on admission)  Assessment & Plan  This is chronic, continue home BuSpar, risperidone, and Cymbalta.       VTE prophylaxis: heparin

## 2019-09-29 NOTE — PROGRESS NOTES
New orders for ketamine infusion and demerol PRN. Reviewed IV guidelines and discussed with T7 CHEMO Benoit to give on this floor as it is ordered. Will monitor patient for any adverse effects.

## 2019-09-29 NOTE — PROGRESS NOTES
Pt. Asking for increased dose in pain or anxiety medications - discussed with on-call MD. Increase in medication doses not appropriate at this time.

## 2019-09-30 VITALS
SYSTOLIC BLOOD PRESSURE: 110 MMHG | DIASTOLIC BLOOD PRESSURE: 66 MMHG | OXYGEN SATURATION: 100 % | BODY MASS INDEX: 30.39 KG/M2 | WEIGHT: 171.52 LBS | HEART RATE: 87 BPM | HEIGHT: 63 IN | TEMPERATURE: 97.8 F | RESPIRATION RATE: 16 BRPM

## 2019-09-30 PROBLEM — E87.1 HYPONATREMIA: Status: RESOLVED | Noted: 2019-09-28 | Resolved: 2019-09-30

## 2019-09-30 LAB
ANION GAP SERPL CALC-SCNC: 6 MMOL/L (ref 0–11.9)
BUN SERPL-MCNC: 10 MG/DL (ref 8–22)
CALCIUM SERPL-MCNC: 8.5 MG/DL (ref 8.5–10.5)
CHLORIDE SERPL-SCNC: 104 MMOL/L (ref 96–112)
CO2 SERPL-SCNC: 28 MMOL/L (ref 20–33)
CREAT SERPL-MCNC: 0.59 MG/DL (ref 0.5–1.4)
GLUCOSE SERPL-MCNC: 87 MG/DL (ref 65–99)
POTASSIUM SERPL-SCNC: 4 MMOL/L (ref 3.6–5.5)
SODIUM SERPL-SCNC: 138 MMOL/L (ref 135–145)

## 2019-09-30 PROCEDURE — 700111 HCHG RX REV CODE 636 W/ 250 OVERRIDE (IP): Performed by: INTERNAL MEDICINE

## 2019-09-30 PROCEDURE — 700111 HCHG RX REV CODE 636 W/ 250 OVERRIDE (IP): Performed by: HOSPITALIST

## 2019-09-30 PROCEDURE — A9270 NON-COVERED ITEM OR SERVICE: HCPCS | Performed by: HOSPITALIST

## 2019-09-30 PROCEDURE — 80048 BASIC METABOLIC PNL TOTAL CA: CPT

## 2019-09-30 PROCEDURE — 700102 HCHG RX REV CODE 250 W/ 637 OVERRIDE(OP): Performed by: HOSPITALIST

## 2019-09-30 PROCEDURE — 99239 HOSP IP/OBS DSCHRG MGMT >30: CPT | Performed by: INTERNAL MEDICINE

## 2019-09-30 RX ADMIN — LEVETIRACETAM 1500 MG: 500 TABLET ORAL at 11:29

## 2019-09-30 RX ADMIN — KETOROLAC TROMETHAMINE 30 MG: 30 INJECTION, SOLUTION INTRAMUSCULAR at 02:31

## 2019-09-30 RX ADMIN — MEPERIDINE HYDROCHLORIDE 25 MG: 25 INJECTION INTRAMUSCULAR; INTRAVENOUS; SUBCUTANEOUS at 00:21

## 2019-09-30 RX ADMIN — HEPARIN SODIUM 5000 UNITS: 5000 INJECTION INTRAVENOUS; SUBCUTANEOUS at 05:37

## 2019-09-30 RX ADMIN — DIVALPROEX SODIUM 500 MG: 500 TABLET, EXTENDED RELEASE ORAL at 05:37

## 2019-09-30 RX ADMIN — MEPERIDINE HYDROCHLORIDE 25 MG: 25 INJECTION INTRAMUSCULAR; INTRAVENOUS; SUBCUTANEOUS at 05:38

## 2019-09-30 RX ADMIN — KETOROLAC TROMETHAMINE 30 MG: 30 INJECTION, SOLUTION INTRAMUSCULAR at 08:53

## 2019-09-30 RX ADMIN — OMEPRAZOLE 20 MG: 20 CAPSULE, DELAYED RELEASE ORAL at 05:51

## 2019-09-30 RX ADMIN — ASPIRIN 81 MG 81 MG: 81 TABLET ORAL at 05:38

## 2019-09-30 RX ADMIN — MEPERIDINE HYDROCHLORIDE 25 MG: 25 INJECTION INTRAMUSCULAR; INTRAVENOUS; SUBCUTANEOUS at 11:30

## 2019-09-30 RX ADMIN — GABAPENTIN 600 MG: 300 CAPSULE ORAL at 05:37

## 2019-09-30 RX ADMIN — BUSPIRONE HYDROCHLORIDE 5 MG: 30 TABLET ORAL at 06:00

## 2019-09-30 RX ADMIN — DIPHENHYDRAMINE HYDROCHLORIDE 50 MG: 50 INJECTION INTRAMUSCULAR; INTRAVENOUS at 08:53

## 2019-09-30 RX ADMIN — GABAPENTIN 600 MG: 300 CAPSULE ORAL at 11:29

## 2019-09-30 RX ADMIN — DIVALPROEX SODIUM 500 MG: 500 TABLET, EXTENDED RELEASE ORAL at 11:29

## 2019-09-30 RX ADMIN — HEPARIN 500 UNITS: 100 SYRINGE at 12:29

## 2019-09-30 RX ADMIN — DIPHENHYDRAMINE HYDROCHLORIDE 50 MG: 50 INJECTION INTRAMUSCULAR; INTRAVENOUS at 02:31

## 2019-09-30 RX ADMIN — DULOXETINE HYDROCHLORIDE 60 MG: 60 CAPSULE, DELAYED RELEASE ORAL at 05:37

## 2019-09-30 RX ADMIN — LEVETIRACETAM 1500 MG: 500 TABLET ORAL at 05:37

## 2019-09-30 RX ADMIN — SENNOSIDES, DOCUSATE SODIUM 2 TABLET: 50; 8.6 TABLET, FILM COATED ORAL at 05:37

## 2019-09-30 RX ADMIN — RISPERIDONE 2 MG: 1 TABLET ORAL at 05:37

## 2019-09-30 NOTE — DISCHARGE PLANNING
"MAYITO met with patient in her room, , \"Benoit\" was visiting.     Patient is a 49-year old  woman who lives in [place], [place] with her , \"Benoit.\" Patient is disabled and receives SS.     Patient has Medicare for insurance. PCP is Dr. Power. Patient has no other specialists that she sees. Patient reports no issues with ADL's and IADLs. Patient reports no new prescriptions or needs following D/C. Patient is here for a migraine, something that she has regularly. Patient reports she has medication at home.     Patient reports no current needs or concerns upon discharge.     Care Transition Team Assessment    Information Source  Orientation : Oriented x 4  Information Given By: Patient  Who is responsible for making decisions for patient? : Patient    Readmission Evaluation  Is this a readmission?: No    Elopement Risk  Legal Hold: No  Ambulatory or Self Mobile in Wheelchair: Yes  Disoriented: No  Psychiatric Symptoms: None  History of Wandering: No  Elopement this Admit: No  Vocalizing Wanting to Leave: No  Displays Behaviors, Body Language Wanting to Leave: No-Not at Risk for Elopement  Elopement Risk: Not at Risk for Elopement    Interdisciplinary Discharge Planning  Patient or legal guardian wants to designate a caregiver (see row info): No    Discharge Preparedness  What is your plan after discharge?: Home with help  What are your discharge supports?: Spouse  Prior Functional Level: Ambulatory, Independent with Activities of Daily Living, Independent with Medication Management  Difficulity with ADLs: None  Difficulity with IADLs: None    Functional Assesment  Prior Functional Level: Ambulatory, Independent with Activities of Daily Living, Independent with Medication Management    Finances  Financial Barriers to Discharge: No  Prescription Coverage: Yes    Vision / Hearing Impairment  Vision Impairment : Yes  Right Eye Vision: Impaired, Wears Glasses  Left Eye Vision: Impaired, Wears " Glasses  Hearing Impairment : No     Advance Directive  Advance Directive?: None    Domestic Abuse  Have you ever been the victim of abuse or violence?: No  Physical Abuse or Sexual Abuse: No  Verbal Abuse or Emotional Abuse: No  Possible Abuse Reported to:: Not Applicable    Psychological Assessment  Newly Diagnosed Illness: No    Discharge Risks or Barriers  Discharge risks or barriers?: No    Anticipated Discharge Information  Anticipated discharge disposition: Home  Discharge Address: ([address] [place] [address])  Discharge Contact Phone Number: ([phone])    PLAN: Clear to discharge upon medical clearance. No SS needs.

## 2019-09-30 NOTE — PROGRESS NOTES
Report received from night shift RN, assumed care of pt. Pt asleep in bed at this time, in no apparent distress, unlabored breathing. Tele box on, rhythm verified. Call light within reach, bed locked and in lowest position. All fall precautions and hourly rounding in place. Will continue to monitor.

## 2019-09-30 NOTE — PROGRESS NOTES
Pt still complaining of 8-10/10 headache at this time.  Demerol, Benadryl, and Toradol administered for this. Oriented x4. Pt. calm and cooperative.

## 2019-09-30 NOTE — PROGRESS NOTES
Pt A&Ox4, given discharge instructions. Discussed follow-up, diet, activity, symptoms and management and prescriptions provided. Per pt and , pt has appointment with pain specialist on Thursday. Pt verbalized understanding of all discharge instructions. All questions answered. IV d/c'd, tele monitor off, monitor room notified. Pt ambulated off unit with .

## 2019-09-30 NOTE — DOCUMENTATION QUERY
[facility]                                                                       Query Response Note      PATIENT:               BRIGID DAWSON  ACCT #:                  [id number]  MRN:                     [id number]  :                      1970  ADMIT DATE:       2019 9:31 PM  DISCH DATE:          RESPONDING  PROVIDER #:        [id number]           QUERY TEXT:    Depression is documented in the Medical Record.  Please specify the type.    NOTE:  If an appropriate response is not listed below, please respond with a new note.    The patient's Clinical Indicators include:  H&P: Depression, chronic, continue home buspar, risperidone, and cymbalta.  Treatment: Buspar; cymbalta; risperidone  Risk Factors: Chronic migraine headaches; hx drug seeking behavior; hx hydrocephalus with  shunt  Options provided:   -- MDD, recurrent, in full remission   -- MDD, recurrent, in partial remission   -- MDD, mild, recurrent, current episode   -- MDD, moderate, recurrent, current episode   -- MDD, severe, recurrent, current episode, without psychotic features   -- Unable to determine      Query created by: Мария Willoughby on 2019 10:14 AM    RESPONSE TEXT:    Unable to determine          Electronically signed by:  CALLUM CURRAN 2019 12:00 PM

## 2019-09-30 NOTE — DISCHARGE SUMMARY
Discharge Summary    CHIEF COMPLAINT ON ADMISSION  Chief Complaint   Patient presents with   • Headache     HA x 2 weeks s/p spinal stimulator placement.         Reason for Admission  Headache     Admission Date  9/27/2019    CODE STATUS  Prior    HPI & HOSPITAL COURSE    48 yo woman with hydrocephalus with lumbar shunt, CVA, seizure, poorly controlled migraines resulting in recurrent hospitalizations who had spinal stimulator placed 2 weeks ago. She presented with uncontrolled headache despite using her home Benadryl and Toradol.  She was found with hyponatremia 118 and admitted for dehydration.  Her sodium improved and remained stable off IVF and she was eating well.  CT head showed no acute changes.  Her headache was controlled with ketamine and Demerol and was able to discharge home thereafter.    Therefore, she is discharged in good and stable condition to home with close outpatient follow-up.    The patient met 2-midnight criteria for an inpatient stay at the time of discharge.    Discharge Date  9/30/2019    FOLLOW UP ITEMS POST DISCHARGE  Follow-up with pain management  Patient to establish with new neurologist    DISCHARGE DIAGNOSES  Principal Problem (Resolved):    Hyponatremia POA: Unknown  Active Problems:    Headache and focal deficits, likely secondary to Complicated migraine POA: Yes    Depression POA: Yes  Resolved Problems:    Intractable migraine POA: Yes      FOLLOW UP  Elliott Power M.D.  [address]  [facility] [address]  [phone]    In 1 week  If symptoms worsen    Pain Specialist     In 1 week        MEDICATIONS ON DISCHARGE     Medication List      CONTINUE taking these medications      Instructions   aspirin 81 MG Chew chewable tablet  Commonly known as:  ASA   Take 81 mg by mouth every morning.  Dose:  81 mg     busPIRone 5 MG tablet  Commonly known as:  BUSPAR   Take 5 mg by mouth 2 times a day.  Dose:  5 mg     diphenhydrAMINE 50 MG/ML Soln  Commonly known as:  BENADRYL   50 mg by  "Intramuscular route every 6 hours as needed (Migraines). Indications: migraine  Dose:  50 mg     divalproex  MG Tb24  Commonly known as:  DEPAKOTE ER   Take 500 mg by mouth 3 times a day.  Dose:  500 mg     DULoxetine 60 MG Cpep delayed-release capsule  Commonly known as:  CYMBALTA   Take 60 mg by mouth 2 times a day.  Dose:  60 mg     EMGALITY 120 MG/ML Soaj  Generic drug:  Galcanezumab-gnlm   Inject 120 mg as instructed Q30 DAYS.  Dose:  120 mg     gabapentin 600 MG tablet  Commonly known as:  NEURONTIN   Take 600 mg by mouth 3 times a day.  Dose:  600 mg     ketorolac 60 MG/2ML Soln  Commonly known as:  TORADOL   60 mg by Intramuscular route as needed. Indications: Migraine Headache  Dose:  60 mg     levETIRAcetam 500 MG Tabs  Commonly known as:  KEPPRA   Take 1,500 mg by mouth 3 times a day.  Dose:  1,500 mg     MULTIVITAMIN ADULT Tabs   Take 1 Tab by mouth every day.  Dose:  1 Tab     mupirocin 2 % Oint  Commonly known as:  BACTROBAN   Apply  to affected area(s) 3 times a day. 5 day use prior to surgery on 9/13/19     omeprazole 20 MG delayed-release capsule  Commonly known as:  PRILOSEC   Take 20 mg by mouth every day.  Dose:  20 mg     risperiDONE 2 MG Tabs  Commonly known as:  RISPERDAL   Take 2 mg by mouth 2 times a day.  Dose:  2 mg     Valerian 500 MG Caps   Take 1 Cap by mouth 3 times a day as needed. Indications: Feeling Anxious  Dose:  1 Cap            Allergies  Allergies   Allergen Reactions   • Sumatriptan Anaphylaxis     Historical=\"Heart stops.\" Reaction  between 1995 to 1997   • Prochlorperazine Swelling     Tongue swelling. Reaction as a teen. (compazine)  Tolerated Phenergan on 02/24/15   • Vancomycin Shortness of Breath and Rash     Reaction in 2005.  D/W patient 8/31/15 - tolerated loading dose of vancomycin administered on 8/30/15 with some itching to chest with decreased infusion rate. Red Man Syndrome.  2018-tolerated slow drip with benadryl pre-med-had no problems.       DIET  No " orders of the defined types were placed in this encounter.      ACTIVITY  As tolerated.  Weight bearing as tolerated    CONSULTATIONS  None    PROCEDURES  CT-HEAD W/O   Final Result      1.  No evidence of acute intracranial process.      2.  Right-sided ventricular shunt tube in place. No intraparenchymal or intraventricular hemorrhage.            LABORATORY  Lab Results   Component Value Date    SODIUM 138 09/30/2019    POTASSIUM 4.0 09/30/2019    CHLORIDE 104 09/30/2019    CO2 28 09/30/2019    GLUCOSE 87 09/30/2019    BUN 10 09/30/2019    CREATININE 0.59 09/30/2019        Lab Results   Component Value Date    WBC 7.9 09/29/2019    HEMOGLOBIN 11.8 (L) 09/29/2019    HEMATOCRIT 37.0 09/29/2019    PLATELETCT 300 09/29/2019        Total time of the discharge process exceeds 32 minutes.

## 2019-09-30 NOTE — DISCHARGE INSTRUCTIONS
Discharge Instructions    Discharged to home by car with relative. Discharged via wheelchair, hospital escort: Yes.  Special equipment needed: Not Applicable    Be sure to schedule a follow-up appointment with your primary care doctor or any specialists as instructed.     Discharge Plan:   Diet Plan: Discussed  Activity Level: Discussed  Confirmed Follow up Appointment: Patient to Call and Schedule Appointment  Confirmed Symptoms Management: Discussed  Medication Reconciliation Updated: Yes  Influenza Vaccine Indication: Patient Refuses    I understand that a diet low in cholesterol, fat, and sodium is recommended for good health. Unless I have been given specific instructions below for another diet, I accept this instruction as my diet prescription.   Other diet: regular    Special Instructions: None    · Is patient discharged on Warfarin / Coumadin?   No     Depression / Suicide Risk    As you are discharged from this [facility] facility, it is important to learn how to keep safe from harming yourself.    Recognize the warning signs:  · Abrupt changes in personality, positive or negative- including increase in energy   · Giving away possessions  · Change in eating patterns- significant weight changes-  positive or negative  · Change in sleeping patterns- unable to sleep or sleeping all the time   · Unwillingness or inability to communicate  · Depression  · Unusual sadness, discouragement and loneliness  · Talk of wanting to die  · Neglect of personal appearance   · Rebelliousness- reckless behavior  · Withdrawal from people/activities they love  · Confusion- inability to concentrate     If you or a loved one observes any of these behaviors or has concerns about self-harm, here's what you can do:  · Talk about it- your feelings and reasons for harming yourself  · Remove any means that you might use to hurt yourself (examples: pills, rope, extension cords, firearm)  · Get professional help from the community  (Mental Health, Substance Abuse, psychological counseling)  · Do not be alone:Call your Safe Contact- someone whom you trust who will be there for you.  · Call your local CRISIS HOTLINE 111-5662 or 186-623-9456  · Call your local Children's Mobile Crisis Response Team [place] (396) 330-3196 or www.[employer]  · Call the toll free National Suicide Prevention Hotlines   · National Suicide Prevention Lifeline 752-082-LRVA (9169)  · [employer] [facility] Line Network 800-SUICIDE (456-4003)          Hyponatremia  Introduction  Hyponatremia is when the amount of salt (sodium) in your blood is too low. When salt levels are low, your cells absorb extra water and they swell. The swelling happens throughout the body, but it mostly affects the brain.  Follow these instructions at home:  · Take medicines only as told by your doctor. Many medicines can make this condition worse. Talk with your doctor about any medicines that you are currently taking.  · Carefully follow a recommended diet as told by your doctor.  · Carefully follow instructions from your doctor about fluid restrictions.  · Keep all follow-up visits as told by your doctor. This is important.  · Do not drink alcohol.  Contact a doctor if:  · You feel sicker to your stomach (nauseous).  · You feel more confused.  · You feel more tired (fatigued).  · Your headache gets worse.  · You feel weaker.  · Your symptoms go away and then they come back.  · You have trouble following the diet instructions.  Get help right away if:  · You start to twitch and shake (have a seizure).  · You pass out (faint).  · You keep having watery poop (diarrhea).  · You keep throwing up (vomiting).  This information is not intended to replace advice given to you by your health care provider. Make sure you discuss any questions you have with your health care provider.  Document Released: 08/29/2012 Document Revised: 05/25/2017 Document Reviewed: 12/14/2015  © 2017 Elsevier        Migraine  Headache  A migraine headache is a very strong throbbing pain on one side or both sides of your head. Migraines can also cause other symptoms. Talk with your doctor about what things may bring on (trigger) your migraine headaches.  Follow these instructions at home:  Medicines  · Take over-the-counter and prescription medicines only as told by your doctor.  · Do not drive or use heavy machinery while taking prescription pain medicine.  · To prevent or treat constipation while you are taking prescription pain medicine, your doctor may recommend that you:  ¨ Drink enough fluid to keep your pee (urine) clear or pale yellow.  ¨ Take over-the-counter or prescription medicines.  ¨ Eat foods that are high in fiber. These include fresh fruits and vegetables, whole grains, and beans.  ¨ Limit foods that are high in fat and processed sugars. These include fried and sweet foods.  Lifestyle  · Avoid alcohol.  · Do not use any products that contain nicotine or tobacco, such as cigarettes and e-cigarettes. If you need help quitting, ask your doctor.  · Get at least 8 hours of sleep every night.  · Limit your stress.  General instructions  · Keep a journal to find out what may bring on your migraines. For example, write down:  ¨ What you eat and drink.  ¨ How much sleep you get.  ¨ Any change in what you eat or drink.  ¨ Any change in your medicines.  · If you have a migraine:  ¨ Avoid things that make your symptoms worse, such as bright lights.  ¨ It may help to lie down in a dark, quiet room.  ¨ Do not drive or use heavy machinery.  ¨ Ask your doctor what activities are safe for you.  · Keep all follow-up visits as told by your doctor. This is important.  Contact a doctor if:  · You get a migraine that is different or worse than your usual migraines.  Get help right away if:  · Your migraine gets very bad.  · You have a fever.  · You have a stiff neck.  · You have trouble seeing.  · Your muscles feel weak or like you cannot  control them.  · You start to lose your balance a lot.  · You start to have trouble walking.  · You pass out (faint).  This information is not intended to replace advice given to you by your health care provider. Make sure you discuss any questions you have with your health care provider.  Document Released: 09/26/2009 Document Revised: 07/07/2017 Document Reviewed: 06/05/2017  [employer] Interactive Patient Education © 2017 [employer]          Discharge Instructions per Jeane Petit M.D.    DIAGNOSIS: Low sodium. Migraine. Return to ER if you have uncontrolled headache or uncontrolled nausea or vomiting.

## 2019-10-16 NOTE — PROGRESS NOTES
Pt addmited overnight for pain control after replacement of SCS device for suboccipital radiculopathy. Was discharged after 23 hours due to delay in discharging Otherwise unremarkable post op course.

## 2019-10-29 ENCOUNTER — APPOINTMENT (OUTPATIENT)
Dept: RADIOLOGY | Facility: MEDICAL CENTER | Age: 49
End: 2019-10-29
Attending: EMERGENCY MEDICINE
Payer: MEDICARE

## 2019-10-29 ENCOUNTER — HOSPITAL ENCOUNTER (OUTPATIENT)
Facility: MEDICAL CENTER | Age: 49
End: 2019-11-02
Attending: EMERGENCY MEDICINE | Admitting: HOSPITALIST
Payer: MEDICARE

## 2019-10-29 PROBLEM — R25.1 TREMOR: Status: ACTIVE | Noted: 2019-10-29

## 2019-10-29 LAB
AMPHET UR QL SCN: NEGATIVE
ANION GAP SERPL CALC-SCNC: 10 MMOL/L (ref 0–11.9)
BARBITURATES UR QL SCN: NEGATIVE
BASOPHILS # BLD AUTO: 0.2 % (ref 0–1.8)
BASOPHILS # BLD: 0.02 K/UL (ref 0–0.12)
BENZODIAZ UR QL SCN: NEGATIVE
BUN SERPL-MCNC: 3 MG/DL (ref 8–22)
BZE UR QL SCN: NEGATIVE
CALCIUM SERPL-MCNC: 7.8 MG/DL (ref 8.5–10.5)
CANNABINOIDS UR QL SCN: NEGATIVE
CHLORIDE SERPL-SCNC: 90 MMOL/L (ref 96–112)
CO2 SERPL-SCNC: 27 MMOL/L (ref 20–33)
CREAT SERPL-MCNC: 0.59 MG/DL (ref 0.5–1.4)
EOSINOPHIL # BLD AUTO: 0 K/UL (ref 0–0.51)
EOSINOPHIL NFR BLD: 0 % (ref 0–6.9)
ERYTHROCYTE [DISTWIDTH] IN BLOOD BY AUTOMATED COUNT: 44.1 FL (ref 35.9–50)
GLUCOSE BLD-MCNC: 197 MG/DL (ref 65–99)
GLUCOSE SERPL-MCNC: 104 MG/DL (ref 65–99)
HCT VFR BLD AUTO: 37.1 % (ref 37–47)
HGB BLD-MCNC: 11.9 G/DL (ref 12–16)
IMM GRANULOCYTES # BLD AUTO: 0.06 K/UL (ref 0–0.11)
IMM GRANULOCYTES NFR BLD AUTO: 0.7 % (ref 0–0.9)
LYMPHOCYTES # BLD AUTO: 0.58 K/UL (ref 1–4.8)
LYMPHOCYTES NFR BLD: 6.4 % (ref 22–41)
MAGNESIUM SERPL-MCNC: 1.4 MG/DL (ref 1.5–2.5)
MCH RBC QN AUTO: 31.1 PG (ref 27–33)
MCHC RBC AUTO-ENTMCNC: 32.1 G/DL (ref 33.6–35)
MCV RBC AUTO: 96.9 FL (ref 81.4–97.8)
METHADONE UR QL SCN: NEGATIVE
MONOCYTES # BLD AUTO: 0.68 K/UL (ref 0–0.85)
MONOCYTES NFR BLD AUTO: 7.5 % (ref 0–13.4)
NEUTROPHILS # BLD AUTO: 7.72 K/UL (ref 2–7.15)
NEUTROPHILS NFR BLD: 85.2 % (ref 44–72)
NRBC # BLD AUTO: 0 K/UL
NRBC BLD-RTO: 0 /100 WBC
OPIATES UR QL SCN: NEGATIVE
OXYCODONE UR QL SCN: NEGATIVE
PCP UR QL SCN: NEGATIVE
PHOSPHATE SERPL-MCNC: 3 MG/DL (ref 2.5–4.5)
PLATELET # BLD AUTO: 336 K/UL (ref 164–446)
PMV BLD AUTO: 9.4 FL (ref 9–12.9)
POTASSIUM SERPL-SCNC: 3.9 MMOL/L (ref 3.6–5.5)
PROPOXYPH UR QL SCN: NEGATIVE
RBC # BLD AUTO: 3.83 M/UL (ref 4.2–5.4)
SODIUM SERPL-SCNC: 127 MMOL/L (ref 135–145)
TSH SERPL DL<=0.005 MIU/L-ACNC: 2.98 UIU/ML (ref 0.38–5.33)
VALPROATE SERPL-MCNC: 95.5 UG/ML (ref 50–100)
WBC # BLD AUTO: 9.1 K/UL (ref 4.8–10.8)

## 2019-10-29 PROCEDURE — 95951 EEG: CPT | Mod: 26,52 | Performed by: PSYCHIATRY & NEUROLOGY

## 2019-10-29 PROCEDURE — 700111 HCHG RX REV CODE 636 W/ 250 OVERRIDE (IP): Performed by: STUDENT IN AN ORGANIZED HEALTH CARE EDUCATION/TRAINING PROGRAM

## 2019-10-29 PROCEDURE — 83735 ASSAY OF MAGNESIUM: CPT

## 2019-10-29 PROCEDURE — 96375 TX/PRO/DX INJ NEW DRUG ADDON: CPT

## 2019-10-29 PROCEDURE — A9270 NON-COVERED ITEM OR SERVICE: HCPCS | Performed by: HOSPITALIST

## 2019-10-29 PROCEDURE — 80307 DRUG TEST PRSMV CHEM ANLYZR: CPT

## 2019-10-29 PROCEDURE — 80048 BASIC METABOLIC PNL TOTAL CA: CPT

## 2019-10-29 PROCEDURE — 99220 PR INITIAL OBSERVATION CARE,LEVL III: CPT | Performed by: HOSPITALIST

## 2019-10-29 PROCEDURE — 82962 GLUCOSE BLOOD TEST: CPT

## 2019-10-29 PROCEDURE — 700102 HCHG RX REV CODE 250 W/ 637 OVERRIDE(OP): Performed by: INTERNAL MEDICINE

## 2019-10-29 PROCEDURE — 84100 ASSAY OF PHOSPHORUS: CPT

## 2019-10-29 PROCEDURE — 80164 ASSAY DIPROPYLACETIC ACD TOT: CPT

## 2019-10-29 PROCEDURE — 70450 CT HEAD/BRAIN W/O DYE: CPT

## 2019-10-29 PROCEDURE — 96376 TX/PRO/DX INJ SAME DRUG ADON: CPT

## 2019-10-29 PROCEDURE — 95951 EEG: CPT | Mod: 52 | Performed by: PSYCHIATRY & NEUROLOGY

## 2019-10-29 PROCEDURE — 96374 THER/PROPH/DIAG INJ IV PUSH: CPT

## 2019-10-29 PROCEDURE — G0378 HOSPITAL OBSERVATION PER HR: HCPCS

## 2019-10-29 PROCEDURE — 700105 HCHG RX REV CODE 258: Performed by: HOSPITALIST

## 2019-10-29 PROCEDURE — 304561 HCHG STAT O2

## 2019-10-29 PROCEDURE — 700111 HCHG RX REV CODE 636 W/ 250 OVERRIDE (IP): Performed by: EMERGENCY MEDICINE

## 2019-10-29 PROCEDURE — 700111 HCHG RX REV CODE 636 W/ 250 OVERRIDE (IP): Performed by: HOSPITALIST

## 2019-10-29 PROCEDURE — A9270 NON-COVERED ITEM OR SERVICE: HCPCS | Performed by: INTERNAL MEDICINE

## 2019-10-29 PROCEDURE — 85025 COMPLETE CBC W/AUTO DIFF WBC: CPT

## 2019-10-29 PROCEDURE — 84443 ASSAY THYROID STIM HORMONE: CPT

## 2019-10-29 PROCEDURE — 700102 HCHG RX REV CODE 250 W/ 637 OVERRIDE(OP): Performed by: HOSPITALIST

## 2019-10-29 PROCEDURE — 99285 EMERGENCY DEPT VISIT HI MDM: CPT

## 2019-10-29 RX ORDER — KETOROLAC TROMETHAMINE 30 MG/ML
30 INJECTION, SOLUTION INTRAMUSCULAR; INTRAVENOUS EVERY 6 HOURS PRN
Status: DISCONTINUED | OUTPATIENT
Start: 2019-10-29 | End: 2019-11-02 | Stop reason: HOSPADM

## 2019-10-29 RX ORDER — DIVALPROEX SODIUM 500 MG/1
500 TABLET, DELAYED RELEASE ORAL 3 TIMES DAILY
Status: ON HOLD | COMMUNITY
End: 2019-11-02

## 2019-10-29 RX ORDER — OMEPRAZOLE 20 MG/1
20 CAPSULE, DELAYED RELEASE ORAL DAILY
Status: DISCONTINUED | OUTPATIENT
Start: 2019-10-30 | End: 2019-11-02 | Stop reason: HOSPADM

## 2019-10-29 RX ORDER — GABAPENTIN 300 MG/1
600 CAPSULE ORAL 3 TIMES DAILY
Status: DISCONTINUED | OUTPATIENT
Start: 2019-10-29 | End: 2019-11-02 | Stop reason: HOSPADM

## 2019-10-29 RX ORDER — RISPERIDONE 0.5 MG/1
2 TABLET ORAL 2 TIMES DAILY
Status: DISCONTINUED | OUTPATIENT
Start: 2019-10-29 | End: 2019-11-02 | Stop reason: HOSPADM

## 2019-10-29 RX ORDER — AMOXICILLIN 250 MG
2 CAPSULE ORAL 2 TIMES DAILY
Status: DISCONTINUED | OUTPATIENT
Start: 2019-10-29 | End: 2019-11-02 | Stop reason: HOSPADM

## 2019-10-29 RX ORDER — DIVALPROEX SODIUM 500 MG/1
500 TABLET, EXTENDED RELEASE ORAL 3 TIMES DAILY
Status: DISCONTINUED | OUTPATIENT
Start: 2019-10-29 | End: 2019-10-30

## 2019-10-29 RX ORDER — POLYETHYLENE GLYCOL 3350 17 G/17G
1 POWDER, FOR SOLUTION ORAL
Status: DISCONTINUED | OUTPATIENT
Start: 2019-10-29 | End: 2019-11-02 | Stop reason: HOSPADM

## 2019-10-29 RX ORDER — LEVETIRACETAM 500 MG/1
1500 TABLET ORAL 3 TIMES DAILY
Status: DISCONTINUED | OUTPATIENT
Start: 2019-10-29 | End: 2019-11-02 | Stop reason: HOSPADM

## 2019-10-29 RX ORDER — ONDANSETRON 2 MG/ML
4 INJECTION INTRAMUSCULAR; INTRAVENOUS EVERY 4 HOURS PRN
Status: DISCONTINUED | OUTPATIENT
Start: 2019-10-29 | End: 2019-11-02 | Stop reason: HOSPADM

## 2019-10-29 RX ORDER — DIPHENHYDRAMINE HYDROCHLORIDE 50 MG/ML
25 INJECTION INTRAMUSCULAR; INTRAVENOUS ONCE
Status: COMPLETED | OUTPATIENT
Start: 2019-10-29 | End: 2019-10-29

## 2019-10-29 RX ORDER — KETOROLAC TROMETHAMINE 30 MG/ML
15 INJECTION, SOLUTION INTRAMUSCULAR; INTRAVENOUS ONCE
Status: COMPLETED | OUTPATIENT
Start: 2019-10-29 | End: 2019-10-29

## 2019-10-29 RX ORDER — BUTALBITAL, ACETAMINOPHEN AND CAFFEINE 50; 325; 40 MG/1; MG/1; MG/1
2 TABLET ORAL ONCE
Status: COMPLETED | OUTPATIENT
Start: 2019-10-29 | End: 2019-10-29

## 2019-10-29 RX ORDER — B-COMPLEX WITH VITAMIN C
1 TABLET ORAL DAILY
COMMUNITY
End: 2019-12-12

## 2019-10-29 RX ORDER — ASPIRIN 81 MG/1
81 TABLET, CHEWABLE ORAL EVERY MORNING
Status: DISCONTINUED | OUTPATIENT
Start: 2019-10-30 | End: 2019-11-02 | Stop reason: HOSPADM

## 2019-10-29 RX ORDER — DIPHENHYDRAMINE HYDROCHLORIDE 50 MG/ML
50 INJECTION INTRAMUSCULAR; INTRAVENOUS EVERY 6 HOURS PRN
Status: DISCONTINUED | OUTPATIENT
Start: 2019-10-29 | End: 2019-11-02 | Stop reason: HOSPADM

## 2019-10-29 RX ORDER — BUSPIRONE HYDROCHLORIDE 10 MG/1
5 TABLET ORAL 2 TIMES DAILY
Status: DISCONTINUED | OUTPATIENT
Start: 2019-10-29 | End: 2019-11-02 | Stop reason: HOSPADM

## 2019-10-29 RX ORDER — BISACODYL 10 MG
10 SUPPOSITORY, RECTAL RECTAL
Status: DISCONTINUED | OUTPATIENT
Start: 2019-10-29 | End: 2019-11-02 | Stop reason: HOSPADM

## 2019-10-29 RX ORDER — M-VIT,TX,IRON,MINS/CALC/FOLIC 27MG-0.4MG
1 TABLET ORAL DAILY
Status: DISCONTINUED | OUTPATIENT
Start: 2019-10-30 | End: 2019-11-02 | Stop reason: HOSPADM

## 2019-10-29 RX ORDER — DULOXETIN HYDROCHLORIDE 60 MG/1
60 CAPSULE, DELAYED RELEASE ORAL 2 TIMES DAILY
Status: DISCONTINUED | OUTPATIENT
Start: 2019-10-29 | End: 2019-11-02 | Stop reason: HOSPADM

## 2019-10-29 RX ORDER — SODIUM CHLORIDE 9 MG/ML
INJECTION, SOLUTION INTRAVENOUS CONTINUOUS
Status: ACTIVE | OUTPATIENT
Start: 2019-10-29 | End: 2019-10-30

## 2019-10-29 RX ORDER — VITAMIN C
1 TAB ORAL DAILY
Status: DISCONTINUED | OUTPATIENT
Start: 2019-10-30 | End: 2019-11-02 | Stop reason: HOSPADM

## 2019-10-29 RX ADMIN — KETOROLAC TROMETHAMINE 30 MG: 30 INJECTION, SOLUTION INTRAMUSCULAR at 17:24

## 2019-10-29 RX ADMIN — LEVETIRACETAM 1500 MG: 500 TABLET ORAL at 17:19

## 2019-10-29 RX ADMIN — KETOROLAC TROMETHAMINE 30 MG: 30 INJECTION, SOLUTION INTRAMUSCULAR at 23:50

## 2019-10-29 RX ADMIN — DIVALPROEX SODIUM 500 MG: 500 TABLET, FILM COATED, EXTENDED RELEASE ORAL at 17:19

## 2019-10-29 RX ADMIN — KETOROLAC TROMETHAMINE 15 MG: 30 INJECTION, SOLUTION INTRAMUSCULAR; INTRAVENOUS at 11:49

## 2019-10-29 RX ADMIN — KETOROLAC TROMETHAMINE 15 MG: 30 INJECTION, SOLUTION INTRAMUSCULAR; INTRAVENOUS at 12:15

## 2019-10-29 RX ADMIN — BUSPIRONE HYDROCHLORIDE 5 MG: 10 TABLET ORAL at 17:20

## 2019-10-29 RX ADMIN — SENNOSIDES AND DOCUSATE SODIUM 2 TABLET: 8.6; 5 TABLET ORAL at 17:20

## 2019-10-29 RX ADMIN — RISPERIDONE 2 MG: 0.5 TABLET ORAL at 17:17

## 2019-10-29 RX ADMIN — GABAPENTIN 600 MG: 300 CAPSULE ORAL at 17:18

## 2019-10-29 RX ADMIN — DULOXETINE HYDROCHLORIDE 60 MG: 60 CAPSULE, DELAYED RELEASE ORAL at 17:18

## 2019-10-29 RX ADMIN — SODIUM CHLORIDE: 9 INJECTION, SOLUTION INTRAVENOUS at 17:33

## 2019-10-29 RX ADMIN — BUTALBITAL, ACETAMINOPHEN, AND CAFFEINE 2 TABLET: 50; 325; 40 TABLET ORAL at 23:08

## 2019-10-29 RX ADMIN — DIPHENHYDRAMINE HYDROCHLORIDE 25 MG: 50 INJECTION INTRAMUSCULAR; INTRAVENOUS at 12:15

## 2019-10-29 RX ADMIN — DIPHENHYDRAMINE HYDROCHLORIDE 25 MG: 50 INJECTION INTRAMUSCULAR; INTRAVENOUS at 11:49

## 2019-10-29 RX ADMIN — DIPHENHYDRAMINE HYDROCHLORIDE 50 MG: 50 INJECTION INTRAMUSCULAR; INTRAVENOUS at 23:50

## 2019-10-29 RX ADMIN — DIPHENHYDRAMINE HYDROCHLORIDE 50 MG: 50 INJECTION INTRAMUSCULAR; INTRAVENOUS at 17:25

## 2019-10-29 ASSESSMENT — LIFESTYLE VARIABLES
TOTAL SCORE: 0
EVER FELT BAD OR GUILTY ABOUT YOUR DRINKING: NO
CONSUMPTION TOTAL: NEGATIVE
HAVE YOU EVER FELT YOU SHOULD CUT DOWN ON YOUR DRINKING: NO
ON A TYPICAL DAY WHEN YOU DRINK ALCOHOL HOW MANY DRINKS DO YOU HAVE: 2
EVER HAD A DRINK FIRST THING IN THE MORNING TO STEADY YOUR NERVES TO GET RID OF A HANGOVER: NO
HAVE PEOPLE ANNOYED YOU BY CRITICIZING YOUR DRINKING: NO
DOES PATIENT WANT TO STOP DRINKING: NO
AVERAGE NUMBER OF DAYS PER WEEK YOU HAVE A DRINK CONTAINING ALCOHOL: 0
ALCOHOL_USE: NO
TOTAL SCORE: 0
HOW MANY TIMES IN THE PAST YEAR HAVE YOU HAD 5 OR MORE DRINKS IN A DAY: 0
TOTAL SCORE: 0
EVER_SMOKED: NEVER

## 2019-10-29 ASSESSMENT — PATIENT HEALTH QUESTIONNAIRE - PHQ9
2. FEELING DOWN, DEPRESSED, IRRITABLE, OR HOPELESS: SEVERAL DAYS
SUM OF ALL RESPONSES TO PHQ9 QUESTIONS 1 AND 2: 2
SUM OF ALL RESPONSES TO PHQ QUESTIONS 1-9: 10
7. TROUBLE CONCENTRATING ON THINGS, SUCH AS READING THE NEWSPAPER OR WATCHING TELEVISION: SEVERAL DAYS
3. TROUBLE FALLING OR STAYING ASLEEP OR SLEEPING TOO MUCH: MORE THAN HALF THE DAYS
6. FEELING BAD ABOUT YOURSELF - OR THAT YOU ARE A FAILURE OR HAVE LET YOURSELF OR YOUR FAMILY DOWN: SEVERAL DAYS
5. POOR APPETITE OR OVEREATING: SEVERAL DAYS
8. MOVING OR SPEAKING SO SLOWLY THAT OTHER PEOPLE COULD HAVE NOTICED. OR THE OPPOSITE, BEING SO FIGETY OR RESTLESS THAT YOU HAVE BEEN MOVING AROUND A LOT MORE THAN USUAL: NOT AT ALL
9. THOUGHTS THAT YOU WOULD BE BETTER OFF DEAD, OR OF HURTING YOURSELF: NOT AT ALL
4. FEELING TIRED OR HAVING LITTLE ENERGY: NEARLY EVERY DAY
1. LITTLE INTEREST OR PLEASURE IN DOING THINGS: SEVERAL DAYS

## 2019-10-29 ASSESSMENT — COGNITIVE AND FUNCTIONAL STATUS - GENERAL
STANDING UP FROM CHAIR USING ARMS: A LITTLE
WALKING IN HOSPITAL ROOM: A LITTLE
SUGGESTED CMS G CODE MODIFIER MOBILITY: CJ
DAILY ACTIVITIY SCORE: 21
HELP NEEDED FOR BATHING: A LITTLE
MOBILITY SCORE: 21
SUGGESTED CMS G CODE MODIFIER DAILY ACTIVITY: CJ
CLIMB 3 TO 5 STEPS WITH RAILING: A LITTLE
DRESSING REGULAR UPPER BODY CLOTHING: A LITTLE
DRESSING REGULAR LOWER BODY CLOTHING: A LITTLE

## 2019-10-29 ASSESSMENT — ENCOUNTER SYMPTOMS
CHILLS: 0
HEADACHES: 1
SPUTUM PRODUCTION: 0
ORTHOPNEA: 0
COUGH: 0
HEMOPTYSIS: 0
NAUSEA: 0
PALPITATIONS: 0
VOMITING: 0
DIZZINESS: 0

## 2019-10-29 ASSESSMENT — COPD QUESTIONNAIRES
DURING THE PAST 4 WEEKS HOW MUCH DID YOU FEEL SHORT OF BREATH: NONE/LITTLE OF THE TIME
HAVE YOU SMOKED AT LEAST 100 CIGARETTES IN YOUR ENTIRE LIFE: NO/DON'T KNOW
COPD SCREENING SCORE: 0
DO YOU EVER COUGH UP ANY MUCUS OR PHLEGM?: NO/ONLY WITH OCCASIONAL COLDS OR INFECTIONS
IN THE PAST 12 MONTHS DO YOU DO LESS THAN YOU USED TO BECAUSE OF YOUR BREATHING PROBLEMS: DISAGREE/UNSURE

## 2019-10-29 NOTE — ED PROVIDER NOTES
ED Provider Note    Scribed for Oleg Cook D.O. by Berhane Wilson. 10/29/2019  9:15 AM    Primary care provider: Elliott Power M.D.  Means of arrival: Walk-In  History obtained from: Patient  History limited by: Patient    CHIEF COMPLAINT  Chief Complaint   Patient presents with   • Tremors   • Nausea       HPI  Enedelia Pang is a 49 y.o. female with a history of seizures who presents to the Emergency Department for evaluation of tremors onset this 7:30 AM. Patient states that this morning, she felt nauseous, that has been gradually getting worse, prompting her to go to the ED. Patient states that she has had tremors in the past due to medication changes, but is usually limited to her hands. She reports that her last seizure was 8 months ago. They are usually tonic-clonic seizures where she usually collapses. Currently at the ED, she has tremors in her hands, feet, legs, and abdomen. Pain was alleviated slightly after eating. Pressure exacerbates her tremors. Patient has associated migraines, chest pain, urination frequency, and urinary incontinence. She denies any abdominal pain, shortness of breath, or vomiting. Patient had a cold  Week ago, but has since resolved. She reports that she is compliant with her medications. She adds that she has a history of depression.     REVIEW OF SYSTEMS  Pertinent positives include migraines, chest pain, urination frequency, and urinary incontinence. Pertinent negatives include no abdominal pain, shortness of breath, or vomiting.  All other systems reviewed and negative.    PAST MEDICAL HISTORY  Past Medical History:   Diagnosis Date   • Allergy, unspecified not elsewhere classified    • Anemia 10/05/2017    Unsure if a current issue.   • Anesthesia     Migrane after anesthesia   • Anxiety    • Anxiety, generalized    • autoimmune    • Autoimmune disorder (HCC)     Unknown etiology or type   • Clostridium difficile diarrhea 2014    follow up tests negative    • Depression    • Elevated liver enzymes 2017    Related to meds.   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    • Pituitary abnormality (HCC)    • Requires supplemental oxygen     to treat migraines. 2-5L/NC   • Seizure (HCC) started 2017    Unknown etiology-not sure if related to autoimmune issue. Last seizure 2019   • Staph infection    • Stroke (HCC)      with right side residual weakness       SURGICAL HISTORY  Past Surgical History:   Procedure Laterality Date   • PB IMPLANT NEUROSTIM/  2019    Procedure: INSERTION, NEUROSTIMULATOR, PERMANENT, SPINAL CORD;  Surgeon: Seven Mahoney M.D.;  Location: SURGERY [facility];  Service: Pain Management   • SPINAL CORD STIMULATOR  2018    Explanted   • FULL THICKNESS SKIN GRAFT Left 2018     graft to foot (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   • OTHER SURGICAL PROCEDURE  11/10/2017    Power port   • LIVER BIOPSY  2017   •  SHUNT INSERTION  2017    Procedure:  SHUNT INSERTION;  Surgeon: Drew Berry M.D.;  Location: [facility];  Service:    • SPINAL CORD STIMULATOR  2016    Procedure: SPINAL CORD STIMULATOR FOR: PLACEMENT OF LEFT FLANK OCCIPITAL NERVE STIMULATOR BATTERY PACK;  Surgeon: Oli Oh III, M.D.;  Location: SURGERY [facility];  Service:    • LUMBAR EXPLORATION N/A 2015    Procedure:  LUMBAR EXPLORATION- Lumbar shunt removal ;  Surgeon: Oli Oh III, M.D.;  Location: SURGERY [facility];  Service:    • OTHER NEUROLOGICAL SURG  08/2015    Explanted lumbar shunt and explanted power port due to MRSA   • IRRIGATION & DEBRIDEMENT NEURO N/A 6/28/2015    Procedure: IRRIGATION & DEBRIDEMENT NEURO;  Surgeon: Oli Oh III, M.D.;  Location: SURGERY [facility];  Service:    • SPINAL CORD STIMULATOR  2003    1st implant   • CHOLECYSTECTOMY  2001    had ruptured day before   • ENDOMETRIAL ABLATION  2001   • TUBAL COAGULATION LAPAROSCOPIC BILATERAL Bilateral 2001   • KNEE ARTHROSCOPY Right 1990   • TONSILLECTOMY  1985   • APPENDECTOMY  1982   • OTHER NEUROLOGICAL SURG  [id number]    occipital nerve stimulator-revisions for total of 9 procedures        SOCIAL HISTORY  Social History     Tobacco Use   • Smoking status: Never Smoker   • Smokeless tobacco: Never Used   Substance Use Topics   • Alcohol use: Not Currently     Frequency: Never     Binge frequency: Never   • Drug use: Never      Social History     Substance and Sexual Activity   Drug Use Never       FAMILY HISTORY  Family History   Problem Relation Age of Onset   • No Known Problems Mother    • No Known Problems Father        CURRENT MEDICATIONS  Home Medications     Reviewed by Dong Palacio R.N. (Registered Nurse) on 10/29/19 at 0831  Med List Status: Partial   Medication Last Dose Status   aspirin (ASA) 81 MG Chew Tab chewable tablet  Active   busPIRone (BUSPAR) 5 MG tablet  Active   diphenhydrAMINE (BENADRYL) 50 MG/ML Solution  Active   divalproex ER (DEPAKOTE ER) 500 MG TABLET SR 24 HR  Active   duloxetine (CYMBALTA) 60 MG CPEP delayed-release capsule  Active   EMGALITY 120 MG/ML Solution Auto-injector  Active   gabapentin (NEURONTIN) 600 MG tablet  Active   ketorolac (TORADOL) 60 MG/2ML Solution  Active   levETIRAcetam (KEPPRA) 500 MG Tab  Active   Multiple Vitamins-Minerals (MULTIVITAMIN ADULT) Tab  Active   mupirocin  "(BACTROBAN) 2 % Ointment  Active   omeprazole (PRILOSEC) 20 MG delayed-release capsule  Active   risperiDONE (RISPERDAL) 2 MG Tab  Active   Valerian 500 MG Cap  Active                ALLERGIES  Allergies   Allergen Reactions   • Sumatriptan Anaphylaxis     Historical=\"Heart stops.\" Reaction  between 1995 to 1997   • Prochlorperazine Swelling     Tongue swelling. Reaction as a teen. (compazine)  Tolerated Phenergan on 02/24/15   • Vancomycin Shortness of Breath and Rash     Reaction in 2005.  D/W patient 8/31/15 - tolerated loading dose of vancomycin administered on 8/30/15 with some itching to chest with decreased infusion rate. Red Man Syndrome.  2018-tolerated slow drip with benadryl pre-med-had no problems.       PHYSICAL EXAM  VITAL SIGNS: /97   Pulse (!) 114   Temp 36.3 °C (97.4 °F) (Oral)   Resp 16   Ht 1.6 m (5' 3\")   Wt 72.6 kg (160 lb)   SpO2 95%   BMI 28.34 kg/m²     Nursing notes and vitals reviewed.  Constitutional: Well developed, Well nourished, No acute distress, Non-toxic appearance.   Eyes: PERRLA, EOMI, Conjunctiva normal, No discharge.   Cardiovascular: Normal heart rate, Normal rhythm, No murmurs, No rubs, No gallops.   Thorax & Lungs: No respiratory distress, No rales, No rhonchi, No wheezing, No chest tenderness.   Abdomen: Bowel sounds normal, Soft, No tenderness, No guarding, No rebound, No masses, No pulsatile masses.   Skin: Warm, Dry, No erythema, No rash.   Musculoskeletal: Intact distal pulses, No edema, No cyanosis, No clubbing. Good range of motion in all major joints. No tenderness to palpation or major deformities noted, no CVA tenderness, no midline back tenderness.   Back:  Scapula midline surgical incision healing, Midline surgical scar healing, Right flank surgical scar healing, Left flank surgical scar healing   Neurologic: Alert & oriented x 3, Normal motor function, Normal sensory function, No focal deficits noted.  Psychiatric: Affect normal for clinical " presentation.    DIAGNOSTIC STUDIES/PROCEDURES    LABS  Results for orders placed or performed during the hospital encounter of 10/29/19   CBC WITH DIFFERENTIAL   Result Value Ref Range    WBC 9.1 4.8 - 10.8 K/uL    RBC 3.83 (L) 4.20 - 5.40 M/uL    Hemoglobin 11.9 (L) 12.0 - 16.0 g/dL    Hematocrit 37.1 37.0 - 47.0 %    MCV 96.9 81.4 - 97.8 fL    MCH 31.1 27.0 - 33.0 pg    MCHC 32.1 (L) 33.6 - 35.0 g/dL    RDW 44.1 35.9 - 50.0 fL    Platelet Count 336 164 - 446 K/uL    MPV 9.4 9.0 - 12.9 fL    Neutrophils-Polys 85.20 (H) 44.00 - 72.00 %    Lymphocytes 6.40 (L) 22.00 - 41.00 %    Monocytes 7.50 0.00 - 13.40 %    Eosinophils 0.00 0.00 - 6.90 %    Basophils 0.20 0.00 - 1.80 %    Immature Granulocytes 0.70 0.00 - 0.90 %    Nucleated RBC 0.00 /100 WBC    Neutrophils (Absolute) 7.72 (H) 2.00 - 7.15 K/uL    Lymphs (Absolute) 0.58 (L) 1.00 - 4.80 K/uL    Monos (Absolute) 0.68 0.00 - 0.85 K/uL    Eos (Absolute) 0.00 0.00 - 0.51 K/uL    Baso (Absolute) 0.02 0.00 - 0.12 K/uL    Immature Granulocytes (abs) 0.06 0.00 - 0.11 K/uL    NRBC (Absolute) 0.00 K/uL   Basic Metabolic Panel   Result Value Ref Range    Sodium 127 (L) 135 - 145 mmol/L    Potassium 3.9 3.6 - 5.5 mmol/L    Chloride 90 (L) 96 - 112 mmol/L    Co2 27 20 - 33 mmol/L    Glucose 104 (H) 65 - 99 mg/dL    Bun 3 (L) 8 - 22 mg/dL    Creatinine 0.59 0.50 - 1.40 mg/dL    Calcium 7.8 (L) 8.5 - 10.5 mg/dL    Anion Gap 10.0 0.0 - 11.9   TSH WITH REFLEX TO FT4   Result Value Ref Range    TSH 2.980 0.380 - 5.330 uIU/mL   URINE DRUG SCREEN   Result Value Ref Range    Amphetamines Urine Negative Negative    Barbiturates Negative Negative    Benzodiazepines Negative Negative    Cocaine Metabolite Negative Negative    Methadone Negative Negative    Opiates Negative Negative    Oxycodone Negative Negative    Phencyclidine -Pcp Negative Negative    Propoxyphene Negative Negative    Cannabinoid Metab Negative Negative   MAGNESIUM   Result Value Ref Range    Magnesium 1.4 (L) 1.5 -  2.5 mg/dL   PHOSPHORUS   Result Value Ref Range    Phosphorus 3.0 2.5 - 4.5 mg/dL   VALPROIC ACID   Result Value Ref Range    Valproic Acid 95.5 50.0 - 100.0 ug/mL   ESTIMATED GFR   Result Value Ref Range    GFR If African American >60 >60 mL/min/1.73 m 2    GFR If Non African American >60 >60 mL/min/1.73 m 2       All labs reviewed by me.    RADIOLOGY  CT-HEAD W/O   Final Result      1.  RIGHT frontal  shunt catheter again present and in similar position.   2.  No acute intracranial abnormality.   3.  RIGHT maxillary sinusitis.        The radiologist's interpretation of all radiological studies have been reviewed by me.    COURSE & MEDICAL DECISION MAKING  Pertinent Labs & Imaging studies reviewed. (See chart for details)    9:15 AM - Patient seen and examined at bedside. I discussed with the patient that she may be experiencing stress incontinence, which may be causing her urination frequency and incontinence. I also discussed my plan of care, which includes obtaining lab work and a CT scan of her head. Ordered CT-Head, Magnesium, Phosphorus, Valproic Acid, urine Drug Screen, CBC, BMP, and TSH to evaluate her symptoms.     This is a charming 49 y.o. female that presents with tremors and headache.  She does have a history of idiopathic intracranial hypertension of the  shunt.  CT scan was completed was negative for acute hydrocephalus and I do believe the shunt is functional.  She did have the reservoir easily compressed without difficulty here in the emergency department.  The patient has no evidence of fever, leukocytosis I do not suspect meningitis.  She received Toradol and Benadryl for her headache.  As for the tremors, she does have a slightly low sodium 127 which may be contributing to her symptoms.  I do believe the patient warrants hospitalization secondary to her condition hyponatremia, tremors.  The patient is not having continued seizure activity.  On reevaluation at 1115, her tremors have  significantly decreased but she still having these episodes and will be receiving medications for her headache.  The patient was discussed with the hospitalist for admission by the resident, Dr. Brennan.      FINAL IMPRESSION  Headache  Hyponatremia  Tremors     Berhane MADERA (Scribe), am scribing for, and in the presence of, Oleg Cook D.O    Electronically signed by: Berhane Wilson (Scribe), 10/29/2019    Oleg MADERA D.O. personally performed the services described in this documentation, as scribed by Berhane Wilson in my presence, and it is both accurate and complete.    C    The note accurately reflects work and decisions made by me.  Oleg Cook  10/29/2019  11:17 AM

## 2019-10-29 NOTE — ED PROVIDER NOTES
ED Provider Note    Scribed for Oleg Cook D.O. by Berhane Wilson. 10/29/2019  9:15 AM    Primary care provider: Elliott Power M.D.  Means of arrival: Walk-In  History obtained from: Patient  History limited by: Patient    CHIEF COMPLAINT  Chief Complaint   Patient presents with   • Tremors   • Nausea       HPI  Enedelia Pang is a 49 y.o. female with a history of seizures who presents to the Emergency Department for evaluation of tremors onset this 7:30 AM. Patient states that this morning, she felt nauseous, that has been gradually getting worse, prompting her to go to the ED. Patient states that she has had tremors in the past due to medication changes, but is usually limited to her hands. She reports that her last seizure was 8 months ago. They are usually tonic-clonic seizures where she usually collapses. Currently at the ED, she has tremors in her hands, feet, legs, and abdomen. Pain was alleviated slightly after eating. Pressure exacerbates her tremors. Patient has associated migraines, chest pain, urination frequency, and urinary incontinence. She denies any abdominal pain, shortness of breath, or vomiting. Patient had a cold  Week ago, but has since resolved. She reports that she is compliant with her medications. She adds that she has a history of depression.     REVIEW OF SYSTEMS  Pertinent positives include migraines, chest pain, urination frequency, and urinary incontinence. Pertinent negatives include no abdominal pain, shortness of breath, or vomiting.  All other systems reviewed and negative.    PAST MEDICAL HISTORY  Past Medical History:   Diagnosis Date   • Allergy, unspecified not elsewhere classified    • Anemia 10/05/2017    Unsure if a current issue.   • Anesthesia     Migrane after anesthesia   • Anxiety    • Anxiety, generalized    • autoimmune    • Autoimmune disorder (HCC)     Unknown etiology or type   • Clostridium difficile diarrhea 2014    follow up tests negative    • Depression    • Elevated liver enzymes 2017    Related to meds.   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    • Pituitary abnormality (HCC)    • Requires supplemental oxygen     to treat migraines. 2-5L/NC   • Seizure (HCC) started 2017    Unknown etiology-not sure if related to autoimmune issue. Last seizure 2019   • Staph infection    • Stroke (HCC)      with right side residual weakness       SURGICAL HISTORY  Past Surgical History:   Procedure Laterality Date   • PB IMPLANT NEUROSTIM/  2019    Procedure: INSERTION, NEUROSTIMULATOR, PERMANENT, SPINAL CORD;  Surgeon: Seven Mahoney M.D.;  Location: SURGERY [facility];  Service: Pain Management   • SPINAL CORD STIMULATOR  2018    Explanted   • FULL THICKNESS SKIN GRAFT Left 2018     graft to foot (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   • OTHER SURGICAL PROCEDURE  11/10/2017    Power port   • LIVER BIOPSY  2017   •  SHUNT INSERTION  2017    Procedure:  SHUNT INSERTION;  Surgeon: Drew Berry M.D.;  Location: [facility];  Service:    • SPINAL CORD STIMULATOR  2016    Procedure: SPINAL CORD STIMULATOR FOR: PLACEMENT OF LEFT FLANK OCCIPITAL NERVE STIMULATOR BATTERY PACK;  Surgeon: Oli Oh III, M.D.;  Location: SURGERY [facility];  Service:    • LUMBAR EXPLORATION N/A 2015    Procedure:  LUMBAR EXPLORATION- Lumbar shunt removal ;  Surgeon: Oli Oh III, M.D.;  Location: SURGERY [facility];  Service:    • OTHER NEUROLOGICAL SURG  08/2015    Explanted lumbar shunt and explanted power port due to MRSA   • IRRIGATION & DEBRIDEMENT NEURO N/A 6/28/2015    Procedure: IRRIGATION & DEBRIDEMENT NEURO;  Surgeon: Oli Oh III, M.D.;  Location: SURGERY [facility];  Service:    • SPINAL CORD STIMULATOR  2003    1st implant   • CHOLECYSTECTOMY  2001    had ruptured day before   • ENDOMETRIAL ABLATION  2001   • TUBAL COAGULATION LAPAROSCOPIC BILATERAL Bilateral 2001   • KNEE ARTHROSCOPY Right 1990   • TONSILLECTOMY  1985   • APPENDECTOMY  1982   • OTHER NEUROLOGICAL SURG  7226-2457    occipital nerve stimulator-revisions for total of 9 procedures        SOCIAL HISTORY  Social History     Tobacco Use   • Smoking status: Never Smoker   • Smokeless tobacco: Never Used   Substance Use Topics   • Alcohol use: Not Currently     Frequency: Never     Binge frequency: Never   • Drug use: Never      Social History     Substance and Sexual Activity   Drug Use Never       FAMILY HISTORY  Family History   Problem Relation Age of Onset   • No Known Problems Mother    • No Known Problems Father        CURRENT MEDICATIONS  Home Medications     Reviewed by Dong Palacio R.N. (Registered Nurse) on 10/29/19 at 0831  Med List Status: Partial   Medication Last Dose Status   aspirin (ASA) 81 MG Chew Tab chewable tablet  Active   busPIRone (BUSPAR) 5 MG tablet  Active   diphenhydrAMINE (BENADRYL) 50 MG/ML Solution  Active   divalproex ER (DEPAKOTE ER) 500 MG TABLET SR 24 HR  Active   duloxetine (CYMBALTA) 60 MG CPEP delayed-release capsule  Active   EMGALITY 120 MG/ML Solution Auto-injector  Active   gabapentin (NEURONTIN) 600 MG tablet  Active   ketorolac (TORADOL) 60 MG/2ML Solution  Active   levETIRAcetam (KEPPRA) 500 MG Tab  Active   Multiple Vitamins-Minerals (MULTIVITAMIN ADULT) Tab  Active   mupirocin  "(BACTROBAN) 2 % Ointment  Active   omeprazole (PRILOSEC) 20 MG delayed-release capsule  Active   risperiDONE (RISPERDAL) 2 MG Tab  Active   Valerian 500 MG Cap  Active                ALLERGIES  Allergies   Allergen Reactions   • Sumatriptan Anaphylaxis     Historical=\"Heart stops.\" Reaction  between 1995 to 1997   • Prochlorperazine Swelling     Tongue swelling. Reaction as a teen. (compazine)  Tolerated Phenergan on 02/24/15   • Vancomycin Shortness of Breath and Rash     Reaction in 2005.  D/W patient 8/31/15 - tolerated loading dose of vancomycin administered on 8/30/15 with some itching to chest with decreased infusion rate. Red Man Syndrome.  2018-tolerated slow drip with benadryl pre-med-had no problems.       PHYSICAL EXAM  VITAL SIGNS: /97   Pulse (!) 114   Temp 36.3 °C (97.4 °F) (Oral)   Resp 16   Ht 1.6 m (5' 3\")   Wt 72.6 kg (160 lb)   SpO2 95%   BMI 28.34 kg/m²     Nursing notes and vitals reviewed.  Constitutional: Well developed, Well nourished, No acute distress, Non-toxic appearance.   Eyes: PERRLA, EOMI, Conjunctiva normal, No discharge.   Cardiovascular: Normal heart rate, Normal rhythm, No murmurs, No rubs, No gallops.   Thorax & Lungs: No respiratory distress, No rales, No rhonchi, No wheezing, No chest tenderness.   Abdomen: Bowel sounds normal, Soft, No tenderness, No guarding, No rebound, No masses, No pulsatile masses.   Skin: Warm, Dry, No erythema, No rash.   Musculoskeletal: Intact distal pulses, No edema, No cyanosis, No clubbing. Good range of motion in all major joints. No tenderness to palpation or major deformities noted, no CVA tenderness, no midline back tenderness.   Back:  Scapula midline surgical incision healing, Midline surgical scar healing, Right flank surgical scar healing, Left flank surgical scar healing   Neurologic: Alert & oriented x 3, Normal motor function, Normal sensory function, No focal deficits noted.  Psychiatric: Affect normal for clinical " presentation.    DIAGNOSTIC STUDIES/PROCEDURES    LABS  .this  All labs reviewed by me.    RADIOLOGY  No orders to display     The radiologist's interpretation of all radiological studies have been reviewed by me.    COURSE & MEDICAL DECISION MAKING  Pertinent Labs & Imaging studies reviewed. (See chart for details)    9:15 AM - Patient seen and examined at bedside. I discussed with the patient that she may be experiencing stress incontinence, which may be causing her urination frequency and incontinence. Patient will be treated with ***. Ordered Urine Drug screen, CBC, BMP, and TSH with reflex to FT4 to evaluate her symptoms.     ***    This is a charming 49 y.o. female that presents with ***.    {.EMSSDCNOTE or .EMSSADMITNOTE}    FINAL IMPRESSION  No diagnosis found.      Berhane MADERA (Scribe), am scribing for, and in the presence of, Oleg Cook D.O    Electronically signed by: Berhane Wilson (Scribe), 10/29/2019    Oleg MADERA D.O. personally performed the services described in this documentation, as scribed by Berhane Wilson in my presence, and it is both accurate and complete.    C    {ERP Attestation (ERP ONLY):200109}

## 2019-10-29 NOTE — PROGRESS NOTES
2 RN skin check completed with CAROLINA Russell.  Pt has scarring on back from prior surgeries. No other skin issues noted.

## 2019-10-29 NOTE — PROGRESS NOTES
"      Spiritual Care Note    Patient Information     Patient's Name: Enedelia Pang   MRN: [id number]    YOB: 1970   Age and Gender: 49 y.o. female   Service Area: ED [facility]   Room (and Bed):  16/16 [facility]   Ethnicity or Nationality:     Primary Language: English   Anglican/Spiritual preference: Synagogue   Place of Residence: [place], [place]   Family/Friends/Others Present: Yes,    Clinical Team Present: No   Medical Diagnosis(-es)/Procedure(s): Tremors   Code Status: Full Code    Date of Admission: 10/29/2019   Length of Stay: 0 days        Spiritual Care Provider Information:  Name of Spiritual Care Provider: Hali Snowden  Title of Spiritual Care Provider: Associate   Phone Number: [phone]  E-mail: [email or web address]  Total time : 10 minutes    Spiritual Screen Results:    Gen Nursing        Palliative Care         Encounter/Request Information  Encounter/Request Type   Visited With: Patient and family together  Nature of the Visit: Initial  General Visit: Yes  Referral From/ Origin of Request: [place] rounds    Religous Needs/Values  Anglican Needs Visit  Anglican Needs: Prayer  Ritual Needs Visit  Ritual Needs: [place]    Spiritual Assessment     Spiritual Care Encounters    Observations/Symptoms: Anxious, Discouraged, Pain    Interaction/Conversation: Pt stated that she has been suffering from migraines and autoimmune issues and is tired of \"this long medical saga.\"  Her  Benoit was at the bedside and both of them were grateful for prayer and blessing.    Assessment: Need, Distress    Need: Seeking Spiritual Assistance and Support    Distress: Coping, Suffering    Interventions: Prayer, blessing, conversation.    Outcomes: Connectedness with the Holy/with God, Coping, Spiritual Comfort    Plan: No Further Visits    Notes:            "

## 2019-10-29 NOTE — ED TRIAGE NOTES
Pt to triage, c/o bilat upper ext tremors. Started this morning om the way to breakfast. Pt denies any new medication. Pt's FSBS in triage 197. Pt also c/o nausea.

## 2019-10-29 NOTE — PROCEDURES
VIDEO ELECTROENCEPHALOGRAM REPORT      Referring provider: Dr. Garrison.     DOS: 10/29/2019 (total recording of 24 minutes).     INDICATION:  Enedelia Pang 49 y.o. female presenting with history of seizures, abnormal involuntary movements, tremors in the upper extremities.    CURRENT ANTIEPILEPTIC REGIMEN: Gabapentin, valproic acid, levetiracetam.    TECHNIQUE: 30 channel video electroencephalogram (EEG) was performed in accordance with the international 10-20 system. The study was reviewed in bipolar and referential montages. The recording examined the patient during wakeful and drowsy state(s).     DESCRIPTION OF THE RECORD:  During the wakefulness, the background showed a symmetrical 7 Hz theta activity posteriorly with amplitude of 70 mV.  There was no reactivity to eye closure/opening.  A normal anterior-posterior gradient was noted with faster beta frequencies seen anteriorly.  During drowsiness, theta/delta frequencies were seen.    ACTIVATION PROCEDURES:   Intermittent Photic stimulation was performed in a stepwise fashion from 1 to 30 Hz, but failed to produce any significant background changes..    ICTAL AND/OR INTERICTAL FINDINGS:   No focal or generalized epileptiform activity noted. No regional slowing was seen during this routine study.  No seizures were reported or recorded during the study.     EKG: sampling of the EKG recording demonstrated sinus rhythm.     EVENTS: Right hand tremors, right foot tremors.    INTERPRETATION:  This is an abnormal video EEG recording in the awake and drowsy states.  A mild encephalopathy is suggested, this is nonspecific.  Mild tremors in the right hand and right foot were captured, and were not epileptic.  Clinical correlation is recommended.    Note: This EEG does not rule out epilepsy.  If the clinical suspicion remains high for seizures, a prolonged recording to capture clinical or subclinical events may be helpful.        Ed Linares MD   Epilepsy  and Neurodiagnostics.   Clinical  of Neurology [facility].   Diplomate in Neurology, Epilepsy, and Electrodiagnostic Medicine.   Office: [phone]  Fax: [phone]

## 2019-10-30 PROBLEM — T42.6X1A VALPROIC ACID TOXICITY: Status: ACTIVE | Noted: 2019-10-30

## 2019-10-30 LAB
AMMONIA PLAS-SCNC: 48 UMOL/L (ref 11–45)
ANION GAP SERPL CALC-SCNC: 9 MMOL/L (ref 0–11.9)
BASOPHILS # BLD AUTO: 0.6 % (ref 0–1.8)
BASOPHILS # BLD: 0.03 K/UL (ref 0–0.12)
BUN SERPL-MCNC: 7 MG/DL (ref 8–22)
CALCIUM SERPL-MCNC: 7.8 MG/DL (ref 8.5–10.5)
CHLORIDE SERPL-SCNC: 97 MMOL/L (ref 96–112)
CO2 SERPL-SCNC: 24 MMOL/L (ref 20–33)
CREAT SERPL-MCNC: 0.53 MG/DL (ref 0.5–1.4)
EOSINOPHIL # BLD AUTO: 0.02 K/UL (ref 0–0.51)
EOSINOPHIL NFR BLD: 0.4 % (ref 0–6.9)
ERYTHROCYTE [DISTWIDTH] IN BLOOD BY AUTOMATED COUNT: 44.6 FL (ref 35.9–50)
GLUCOSE SERPL-MCNC: 80 MG/DL (ref 65–99)
HCT VFR BLD AUTO: 37.1 % (ref 37–47)
HGB BLD-MCNC: 12.2 G/DL (ref 12–16)
IMM GRANULOCYTES # BLD AUTO: 0.02 K/UL (ref 0–0.11)
IMM GRANULOCYTES NFR BLD AUTO: 0.4 % (ref 0–0.9)
LYMPHOCYTES # BLD AUTO: 2.14 K/UL (ref 1–4.8)
LYMPHOCYTES NFR BLD: 39.3 % (ref 22–41)
MCH RBC QN AUTO: 31.9 PG (ref 27–33)
MCHC RBC AUTO-ENTMCNC: 32.9 G/DL (ref 33.6–35)
MCV RBC AUTO: 96.9 FL (ref 81.4–97.8)
MONOCYTES # BLD AUTO: 0.47 K/UL (ref 0–0.85)
MONOCYTES NFR BLD AUTO: 8.6 % (ref 0–13.4)
NEUTROPHILS # BLD AUTO: 2.77 K/UL (ref 2–7.15)
NEUTROPHILS NFR BLD: 50.7 % (ref 44–72)
NRBC # BLD AUTO: 0 K/UL
NRBC BLD-RTO: 0 /100 WBC
PLATELET # BLD AUTO: 319 K/UL (ref 164–446)
PMV BLD AUTO: 9.7 FL (ref 9–12.9)
POTASSIUM SERPL-SCNC: 4.1 MMOL/L (ref 3.6–5.5)
PROCALCITONIN SERPL-MCNC: <0.05 NG/ML
RBC # BLD AUTO: 3.83 M/UL (ref 4.2–5.4)
SODIUM SERPL-SCNC: 130 MMOL/L (ref 135–145)
WBC # BLD AUTO: 5.5 K/UL (ref 4.8–10.8)

## 2019-10-30 PROCEDURE — 80048 BASIC METABOLIC PNL TOTAL CA: CPT

## 2019-10-30 PROCEDURE — 96376 TX/PRO/DX INJ SAME DRUG ADON: CPT

## 2019-10-30 PROCEDURE — 84145 PROCALCITONIN (PCT): CPT

## 2019-10-30 PROCEDURE — G0378 HOSPITAL OBSERVATION PER HR: HCPCS

## 2019-10-30 PROCEDURE — 700111 HCHG RX REV CODE 636 W/ 250 OVERRIDE (IP): Performed by: HOSPITALIST

## 2019-10-30 PROCEDURE — 700102 HCHG RX REV CODE 250 W/ 637 OVERRIDE(OP): Performed by: HOSPITALIST

## 2019-10-30 PROCEDURE — 87040 BLOOD CULTURE FOR BACTERIA: CPT

## 2019-10-30 PROCEDURE — 36415 COLL VENOUS BLD VENIPUNCTURE: CPT

## 2019-10-30 PROCEDURE — 700105 HCHG RX REV CODE 258: Performed by: HOSPITALIST

## 2019-10-30 PROCEDURE — 82140 ASSAY OF AMMONIA: CPT

## 2019-10-30 PROCEDURE — A9270 NON-COVERED ITEM OR SERVICE: HCPCS | Performed by: HOSPITALIST

## 2019-10-30 PROCEDURE — 85025 COMPLETE CBC W/AUTO DIFF WBC: CPT

## 2019-10-30 PROCEDURE — 99226 PR SUBSEQUENT OBSERVATION CARE,LEVEL III: CPT | Performed by: HOSPITALIST

## 2019-10-30 RX ORDER — DIVALPROEX SODIUM 500 MG/1
500 TABLET, DELAYED RELEASE ORAL EVERY 8 HOURS
Status: DISCONTINUED | OUTPATIENT
Start: 2019-10-30 | End: 2019-10-30

## 2019-10-30 RX ORDER — DIVALPROEX SODIUM 250 MG/1
250 TABLET, DELAYED RELEASE ORAL EVERY 8 HOURS
Status: DISCONTINUED | OUTPATIENT
Start: 2019-10-30 | End: 2019-11-01

## 2019-10-30 RX ORDER — LACTULOSE 20 G/30ML
30 SOLUTION ORAL 2 TIMES DAILY
Status: DISCONTINUED | OUTPATIENT
Start: 2019-10-30 | End: 2019-11-02 | Stop reason: HOSPADM

## 2019-10-30 RX ORDER — LEVOCARNITINE 330 MG/1
330 TABLET ORAL 2 TIMES DAILY
Status: DISCONTINUED | OUTPATIENT
Start: 2019-10-30 | End: 2019-11-02 | Stop reason: HOSPADM

## 2019-10-30 RX ORDER — TRAMADOL HYDROCHLORIDE 50 MG/1
50 TABLET ORAL EVERY 6 HOURS PRN
Status: DISCONTINUED | OUTPATIENT
Start: 2019-10-30 | End: 2019-11-02 | Stop reason: HOSPADM

## 2019-10-30 RX ADMIN — GABAPENTIN 600 MG: 300 CAPSULE ORAL at 13:48

## 2019-10-30 RX ADMIN — RISPERIDONE 2 MG: 0.5 TABLET ORAL at 16:51

## 2019-10-30 RX ADMIN — TRAMADOL HYDROCHLORIDE 50 MG: 50 TABLET ORAL at 16:52

## 2019-10-30 RX ADMIN — MULTIPLE VITAMINS W/ MINERALS TAB 1 TABLET: TAB at 06:01

## 2019-10-30 RX ADMIN — VITAMIN C 1 TABLET: TAB at 05:59

## 2019-10-30 RX ADMIN — BUSPIRONE HYDROCHLORIDE 5 MG: 10 TABLET ORAL at 06:00

## 2019-10-30 RX ADMIN — DIVALPROEX SODIUM 250 MG: 250 TABLET, DELAYED RELEASE ORAL at 22:00

## 2019-10-30 RX ADMIN — DULOXETINE HYDROCHLORIDE 60 MG: 60 CAPSULE, DELAYED RELEASE ORAL at 16:51

## 2019-10-30 RX ADMIN — DULOXETINE HYDROCHLORIDE 60 MG: 60 CAPSULE, DELAYED RELEASE ORAL at 06:00

## 2019-10-30 RX ADMIN — OMEPRAZOLE 20 MG: 20 CAPSULE, DELAYED RELEASE ORAL at 06:00

## 2019-10-30 RX ADMIN — DIVALPROEX SODIUM 500 MG: 500 TABLET, FILM COATED, EXTENDED RELEASE ORAL at 12:00

## 2019-10-30 RX ADMIN — KETOROLAC TROMETHAMINE 30 MG: 30 INJECTION, SOLUTION INTRAMUSCULAR at 06:01

## 2019-10-30 RX ADMIN — DIVALPROEX SODIUM 500 MG: 500 TABLET, FILM COATED, EXTENDED RELEASE ORAL at 06:02

## 2019-10-30 RX ADMIN — BUSPIRONE HYDROCHLORIDE 5 MG: 10 TABLET ORAL at 16:51

## 2019-10-30 RX ADMIN — DIVALPROEX SODIUM 500 MG: 500 TABLET, DELAYED RELEASE ORAL at 13:48

## 2019-10-30 RX ADMIN — DIPHENHYDRAMINE HYDROCHLORIDE 50 MG: 50 INJECTION INTRAMUSCULAR; INTRAVENOUS at 19:29

## 2019-10-30 RX ADMIN — GABAPENTIN 600 MG: 300 CAPSULE ORAL at 16:50

## 2019-10-30 RX ADMIN — GABAPENTIN 600 MG: 300 CAPSULE ORAL at 06:00

## 2019-10-30 RX ADMIN — RISPERIDONE 2 MG: 0.5 TABLET ORAL at 06:01

## 2019-10-30 RX ADMIN — LEVOCARNITINE 330 MG: 330 TABLET ORAL at 17:47

## 2019-10-30 RX ADMIN — LEVETIRACETAM 1500 MG: 500 TABLET ORAL at 06:00

## 2019-10-30 RX ADMIN — KETOROLAC TROMETHAMINE 30 MG: 30 INJECTION, SOLUTION INTRAMUSCULAR at 13:47

## 2019-10-30 RX ADMIN — SENNOSIDES AND DOCUSATE SODIUM 2 TABLET: 8.6; 5 TABLET ORAL at 05:59

## 2019-10-30 RX ADMIN — LEVETIRACETAM 1500 MG: 500 TABLET ORAL at 13:48

## 2019-10-30 RX ADMIN — DIPHENHYDRAMINE HYDROCHLORIDE 50 MG: 50 INJECTION INTRAMUSCULAR; INTRAVENOUS at 13:47

## 2019-10-30 RX ADMIN — DIPHENHYDRAMINE HYDROCHLORIDE 50 MG: 50 INJECTION INTRAMUSCULAR; INTRAVENOUS at 06:01

## 2019-10-30 RX ADMIN — TRAMADOL HYDROCHLORIDE 50 MG: 50 TABLET ORAL at 22:43

## 2019-10-30 RX ADMIN — KETOROLAC TROMETHAMINE 30 MG: 30 INJECTION, SOLUTION INTRAMUSCULAR at 19:29

## 2019-10-30 RX ADMIN — LACTULOSE 30 ML: 20 SOLUTION ORAL at 16:51

## 2019-10-30 RX ADMIN — LEVETIRACETAM 1500 MG: 500 TABLET ORAL at 16:52

## 2019-10-30 RX ADMIN — SODIUM CHLORIDE: 9 INJECTION, SOLUTION INTRAVENOUS at 06:06

## 2019-10-30 RX ADMIN — ASPIRIN 81 MG 81 MG: 81 TABLET ORAL at 06:01

## 2019-10-30 ASSESSMENT — ENCOUNTER SYMPTOMS
SPUTUM PRODUCTION: 0
HEARTBURN: 0
FLANK PAIN: 0
COUGH: 0
ORTHOPNEA: 0
DIAPHORESIS: 0
PND: 0
BLURRED VISION: 0
ABDOMINAL PAIN: 0
SINUS PAIN: 0
SHORTNESS OF BREATH: 0
FEVER: 0
MYALGIAS: 0
HEADACHES: 0
TREMORS: 1
STRIDOR: 0
NAUSEA: 1
POLYDIPSIA: 0
CHILLS: 0
WEAKNESS: 0
SORE THROAT: 0
DEPRESSION: 0
CLAUDICATION: 0
VOMITING: 1
DIARRHEA: 0
BLOOD IN STOOL: 0
HALLUCINATIONS: 0
NECK PAIN: 0
PHOTOPHOBIA: 0
HEMOPTYSIS: 0
CONSTIPATION: 0
WHEEZING: 0
BRUISES/BLEEDS EASILY: 0
DIZZINESS: 1
BACK PAIN: 0
EYE PAIN: 0
FALLS: 0
PALPITATIONS: 0
TINGLING: 0
DOUBLE VISION: 0

## 2019-10-30 ASSESSMENT — LIFESTYLE VARIABLES: SUBSTANCE_ABUSE: 0

## 2019-10-30 NOTE — CARE PLAN
Problem: Communication  Goal: The ability to communicate needs accurately and effectively will improve  Outcome: PROGRESSING AS EXPECTED  Plan of care including floor routine discussed     Problem: Safety  Goal: Will remain free from injury  Outcome: PROGRESSING AS EXPECTED  Bed alarm on. Call light within reach     Problem: Pain Management  Goal: Pain level will decrease to patient's comfort goal  Outcome: PROGRESSING AS EXPECTED  Prn pain med frequency discussed

## 2019-10-30 NOTE — ASSESSMENT & PLAN NOTE
It is likely due to a chronic exposure of valproic acid is causing her tremors  Hyperammonia-we will give carnitine and lactulose  Reduced Depakote level to to 250mg during morning and evening with 500 mg at night.  Trend LFTs- improving

## 2019-10-30 NOTE — PROGRESS NOTES
[facility] Medicine Daily Progress Note    Date of Service  10/30/2019    Chief Complaint  49 y.o. female admitted 10/29/2019 with history of pseudotumor cerebri, status post  shunt, history of seizure disorders, chronic headaches comes at the emergency room with complaints of tremors.  Tremors are no nonintentional and happens in all 4 extremities.    Hospital Course    Upon arrival to the emergency room her blood pressure was 152/97, pulse 114.  Sodium was 127, calcium 7.8, anion gap 10, TSH 2.9, valproic acid 95, magnesium 1.4.  EEG found toxic encephalopathy      Interval Problem Update  10/30: Patient continues complains about headaches and tremors.  I have prescribed her tramadol.  Her ammonia levels were elevated to 48.  I prescribed her carnitine and lactulose and reduce her dose of valproic acid to 250 mg    Consultants/Specialty  None    Code Status  full    Disposition  None    Review of Systems  Review of Systems   Constitutional: Positive for malaise/fatigue. Negative for chills, diaphoresis and fever.   HENT: Negative for congestion, ear discharge, ear pain, hearing loss, nosebleeds, sinus pain, sore throat and tinnitus.    Eyes: Negative for blurred vision, double vision, photophobia and pain.   Respiratory: Negative for cough, hemoptysis, sputum production, shortness of breath, wheezing and stridor.    Cardiovascular: Negative for chest pain, palpitations, orthopnea, claudication, leg swelling and PND.   Gastrointestinal: Positive for nausea and vomiting. Negative for abdominal pain, blood in stool, constipation, diarrhea, heartburn and melena.   Genitourinary: Negative for dysuria, flank pain, frequency, hematuria and urgency.   Musculoskeletal: Negative for back pain, falls, joint pain, myalgias and neck pain.   Skin: Negative for itching and rash.   Neurological: Positive for dizziness and tremors. Negative for tingling, weakness and headaches.   Endo/Heme/Allergies: Negative for environmental  allergies and polydipsia. Does not bruise/bleed easily.   Psychiatric/Behavioral: Negative for depression, hallucinations, substance abuse and suicidal ideas.        Physical Exam  Temp:  [36.1 °C (96.9 °F)-37 °C (98.6 °F)] 37 °C (98.6 °F)  Pulse:  [] 94  Resp:  [16-17] 16  BP: ()/(42-67) 93/42  SpO2:  [91 %-100 %] 100 %    Physical Exam   Constitutional: She is oriented to person, place, and time. She appears well-developed and well-nourished.   HENT:   Head: Normocephalic and atraumatic.   Mouth/Throat: No oropharyngeal exudate.   Eyes: Pupils are equal, round, and reactive to light. EOM are normal. No scleral icterus.   Neck: Normal range of motion. Neck supple. No JVD present. No tracheal deviation present. No thyromegaly present.   Cardiovascular: Normal rate, regular rhythm and intact distal pulses. Exam reveals no gallop and no friction rub.   No murmur heard.  Pulmonary/Chest: Effort normal and breath sounds normal. No stridor. No respiratory distress. She has no wheezes. She has no rales. She exhibits no tenderness.   Abdominal: Soft. Bowel sounds are normal. She exhibits no distension and no mass. There is no tenderness. There is no rebound and no guarding.   Musculoskeletal: Normal range of motion. She exhibits no edema.   Lymphadenopathy:     She has no cervical adenopathy.   Neurological: She is alert and oriented to person, place, and time. She has normal reflexes. No cranial nerve deficit. Coordination normal.   Bilateral arm tremors-nonintentional  Cerebellar exam is normal  5 out of 5 strength in all 4 extremities  No sensory loss   Skin: No rash noted. No erythema. No pallor.   Nursing note and vitals reviewed.      Fluids    Intake/Output Summary (Last 24 hours) at 10/30/2019 1628  Last data filed at 10/30/2019 0606  Gross per 24 hour   Intake 1278 ml   Output --   Net 1278 ml       Laboratory  Recent Labs     10/29/19  1009 10/30/19  0419   WBC 9.1 5.5   RBC 3.83* 3.83*   HEMOGLOBIN  11.9* 12.2   HEMATOCRIT 37.1 37.1   MCV 96.9 96.9   MCH 31.1 31.9   MCHC 32.1* 32.9*   RDW 44.1 44.6   PLATELETCT 336 319   MPV 9.4 9.7     Recent Labs     10/29/19  1009 10/30/19  0419   SODIUM 127* 130*   POTASSIUM 3.9 4.1   CHLORIDE 90* 97   CO2 27 24   GLUCOSE 104* 80   BUN 3* 7*   CREATININE 0.59 0.53   CALCIUM 7.8* 7.8*                   Imaging  CT-HEAD W/O   Final Result      1.  RIGHT frontal  shunt catheter again present and in similar position.   2.  No acute intracranial abnormality.   3.  RIGHT maxillary sinusitis.           Assessment/Plan  * Valproic acid toxicity  Assessment & Plan  It is likely due to a chronic exposure of valproic acid is causing her tremors  Hyperammonia-we will give carnitine and lactulose  Reduced Depakote level to to 250mg   Trend LFTs    Tremor- (present on admission)  Assessment & Plan  Patient has history of seizure disorder, she presents with tremor  She does have an extensive seizure history  EEG is negative for seizures  Continue Keppra, Depakote, and Neurontin at current doses  Could be due to hyperammonemia from valproic acid toxicity    Hyponatremia- (present on admission)  Assessment & Plan  Acute on chronic hyponatremia-improving  I do not think this is the cause of her tremor  IV fluids, I ordered repeat labs in the morning to assess her response to treatment    Acute encephalopathy  Assessment & Plan  Likely secondary to chronic Depakote exposure    history of pseudotumor cerebri- (present on admission)  Assessment & Plan  She is status post shunt  Patient is complaining about headaches but procalcitonin is normal and patient has no abdominal pathology.  Reports of no fevers or neck stiffness.  I think infection of the  shunt is highly unlikely    Seizures (HCC)- (present on admission)  Assessment & Plan  Continue keppra and depakote at current doses  EEG    Anxiety- (present on admission)  Assessment & Plan  Continue home dose of BuSpar       VTE prophylaxis:  scd

## 2019-10-30 NOTE — CARE PLAN
Problem: Communication  Goal: The ability to communicate needs accurately and effectively will improve  Outcome: PROGRESSING AS EXPECTED     Problem: Safety  Goal: Will remain free from injury  Outcome: PROGRESSING AS EXPECTED  Goal: Will remain free from falls  Outcome: PROGRESSING AS EXPECTED     Problem: Infection  Goal: Will remain free from infection  Outcome: PROGRESSING AS EXPECTED     Problem: Venous Thromboembolism (VTW)/Deep Vein Thrombosis (DVT) Prevention:  Goal: Patient will participate in Venous Thrombosis (VTE)/Deep Vein Thrombosis (DVT)Prevention Measures  Outcome: PROGRESSING AS EXPECTED     Problem: Bowel/Gastric:  Goal: Normal bowel function is maintained or improved  Outcome: PROGRESSING AS EXPECTED  Goal: Will not experience complications related to bowel motility  Outcome: PROGRESSING AS EXPECTED     Problem: Knowledge Deficit  Goal: Knowledge of disease process/condition, treatment plan, diagnostic tests, and medications will improve  Outcome: PROGRESSING AS EXPECTED  Goal: Knowledge of the prescribed therapeutic regimen will improve  Outcome: PROGRESSING AS EXPECTED     Problem: Discharge Barriers/Planning  Goal: Patient's continuum of care needs will be met  Outcome: PROGRESSING AS EXPECTED     Problem: Pain Management  Goal: Pain level will decrease to patient's comfort goal  Outcome: PROGRESSING AS EXPECTED     Problem: Urinary Elimination:  Goal: Ability to reestablish a normal urinary elimination pattern will improve  Outcome: PROGRESSING AS EXPECTED     Problem: Respiratory:  Goal: Respiratory status will improve  Outcome: PROGRESSING AS EXPECTED

## 2019-10-30 NOTE — ASSESSMENT & PLAN NOTE
She is status post shunt  Patient is complaining about headaches but procalcitonin is normal and patient has no abdominal pathology.  Reports of no fevers or neck stiffness.  I think infection of the  shunt is highly unlikely

## 2019-10-30 NOTE — H&P
Hospital Medicine History & Physical Note    Date of Service  10/29/2019    Primary Care Physician  Elliott Power M.D.    Consultants  None    Code Status  Full Code    Chief Complaint  Tremor    History of Presenting Illness  49 y.o. female who presented 10/29/2019 with tremor.    Ms Pang has a history of pseudotumor cerebri, she is status post  shunt.  Patient also has history of seizure disorder and is on multiple antiepileptic medications.  Over the past 24 hours patient is to remittent tremors.  The tremors can affect any 1 of her 4 extremities.  At times they have lasted for several minutes, there is all spontaneously.  During my encounter she had tremor of her right hand that resolved spontaneously after about 1 minute.  Patient reports compliance with her medications including antiepileptics.  She does have a chronic headache and complains of headache today that is similar to her baseline.    Review of Systems  Review of Systems   Constitutional: Negative for chills and malaise/fatigue.   Respiratory: Negative for cough, hemoptysis and sputum production.    Cardiovascular: Negative for chest pain, palpitations and orthopnea.   Gastrointestinal: Negative for nausea and vomiting.   Skin: Negative for itching and rash.   Neurological: Positive for headaches. Negative for dizziness.   All other systems reviewed and are negative.      Past Medical History   has a past medical history of Allergy, unspecified not elsewhere classified, Anemia (10/05/2017), Anesthesia, Anxiety, Anxiety, generalized, autoimmune, Autoimmune disorder (HCC), Clostridium difficile diarrhea (2014), Depression, Elevated liver enzymes (2017), H/O Clostridium difficile infection (2014), MRSA infection (2003), Hydrocephalus ([facility]) (2015), Joint pain (09/10/2019), Migraine with aura, with intractable migraine, so stated, without mention of status migrainosus, MRSA (methicillin resistant Staphylococcus aureus) (08/2015), MRSA  "(methicillin resistant Staphylococcus aureus) (12/2017), MRSA (methicillin resistant Staphylococcus aureus) (2018), Pituitary abnormality ([facility]) (2002), Requires supplemental oxygen, Seizure ([facility]) (started 2017), Staph infection, and Stroke ([facility]) (2007). She also has no past medical history of Addisons disease ([facility]).    Surgical History   has a past surgical history that includes tonsillectomy (1985); other neurological surg (0821-7547); irrigation & debridement neuro (N/A, 6/28/2015); lumbar exploration (N/A, 8/31/2015); spinal cord stimulator (12/19/2016);  shunt insertion (5/31/2017); liver biopsy (07/24/2017); cholecystectomy (2001); appendectomy (1982); spinal cord stimulator (05/24/2018); spinal cord stimulator (2003); knee arthroscopy (Right, 1990); full thickness skin graft (Left, 04/2018); other surgical procedure (11/10/2017); other neurological surg (08/2015); endometrial ablation (2001); tubal coagulation laparoscopic bilateral (Bilateral, 2001); and pr implant neurostim/ (9/13/2019).     Family History  family history includes No Known Problems in her father and mother.     Social History   reports that she has never smoked. She has never used smokeless tobacco. She reports that she drank alcohol. She reports that she does not use drugs.    Allergies  Allergies   Allergen Reactions   • Sumatriptan Anaphylaxis     Historical=\"Heart stops.\" Reaction  between 1995 to 1997   • Prochlorperazine Swelling     Tongue swelling. Reaction as a teen. (compazine)  Tolerated Phenergan on 02/24/15   • Vancomycin Shortness of Breath and Rash     Reaction in 2005.  D/W patient 8/31/15 - tolerated loading dose of vancomycin administered on 8/30/15 with some itching to chest with decreased infusion rate. Red Man Syndrome.  2018-tolerated slow drip with benadryl pre-med-had no problems.       Medications  Prior to Admission Medications   Prescriptions Last Dose Informant Patient Reported? Taking?   B Complex " Vitamins (VITAMIN B COMPLEX) Tab 10/29/2019 at 0530 Patient Yes Yes   Sig: Take 1 Tab by mouth every day.   EMGALITY 120 MG/ML Solution Auto-injector 6 wks ago at Unknown time Patient Yes No   Sig: Inject 120 mg as instructed Q30 DAYS.   Multiple Vitamins-Minerals (MULTIVITAMIN ADULT) Tab 10/29/2019 at 0530 Patient Yes No   Sig: Take 1 Tab by mouth every day.   aspirin (ASA) 81 MG Chew Tab chewable tablet 10/29/2019 at 0530 Patient Yes No   Sig: Take 81 mg by mouth every morning.   busPIRone (BUSPAR) 5 MG tablet 10/29/2019 at 0530 Patient Yes No   Sig: Take 5 mg by mouth 2 times a day.   diphenhydrAMINE (BENADRYL) 50 MG/ML Solution unknown at Unknown time Patient Yes No   Si mg by Intramuscular route every 6 hours as needed (Migraines). Indications: migraine   divalproex (DEPAKOTE) 500 MG Tablet Delayed Response 10/29/2019 at 0530 Patient Yes Yes   Sig: Take 500 mg by mouth 3 times a day.   duloxetine (CYMBALTA) 60 MG CPEP delayed-release capsule 10/29/2019 at 0530 Patient Yes No   Sig: Take 60 mg by mouth 2 times a day.   gabapentin (NEURONTIN) 600 MG tablet 10/29/2019 at 0530 Patient Yes No   Sig: Take 600 mg by mouth 3 times a day.   levETIRAcetam (KEPPRA) 500 MG Tab 10/29/2019 at 0530 Patient Yes No   Sig: Take 1,500 mg by mouth 3 times a day.   omeprazole (PRILOSEC) 20 MG delayed-release capsule 10/29/2019 at 0530 Patient Yes No   Sig: Take 20 mg by mouth every day.   risperiDONE (RISPERDAL) 2 MG Tab 10/29/2019 at 0530 Patient Yes No   Sig: Take 2 mg by mouth 2 times a day.      Facility-Administered Medications: None       Physical Exam  Temp:  [36.3 °C (97.4 °F)-36.4 °C (97.6 °F)] 36.4 °C (97.6 °F)  Pulse:  [] 102  Resp:  [16-18] 16  BP: (110-152)/(67-97) 123/67  SpO2:  [86 %-99 %] 97 %    Physical Exam   Constitutional: She is oriented to person, place, and time. She appears well-developed and well-nourished.   HENT:   Head: Normocephalic and atraumatic.   Eyes: Conjunctivae and EOM are normal.  Right eye exhibits no discharge. Left eye exhibits no discharge.   Cardiovascular: Normal rate, regular rhythm and intact distal pulses.   No murmur heard.  2+ Radial Pulses  Brisk Capillary Refill   Pulmonary/Chest: Effort normal and breath sounds normal. No respiratory distress. She has no wheezes.   Abdominal: Soft. Bowel sounds are normal. She exhibits no distension. There is no tenderness. There is no rebound.   Musculoskeletal: Normal range of motion. She exhibits no edema.   Neurological: She is alert and oriented to person, place, and time. No cranial nerve deficit.   Tremor in right hand   Skin: Skin is warm and dry. She is not diaphoretic. No erythema.   Skin is warm and well perfused   Psychiatric: She has a normal mood and affect.       Laboratory:  Recent Labs     10/29/19  1009   WBC 9.1   RBC 3.83*   HEMOGLOBIN 11.9*   HEMATOCRIT 37.1   MCV 96.9   MCH 31.1   MCHC 32.1*   RDW 44.1   PLATELETCT 336   MPV 9.4     Recent Labs     10/29/19  1009   SODIUM 127*   POTASSIUM 3.9   CHLORIDE 90*   CO2 27   GLUCOSE 104*   BUN 3*   CREATININE 0.59   CALCIUM 7.8*     Recent Labs     10/29/19  1009   GLUCOSE 104*         No results for input(s): NTPROBNP in the last 72 hours.      No results for input(s): TROPONINT in the last 72 hours.    Urinalysis:    No results found     Imaging:  CT-HEAD W/O   Final Result      1.  RIGHT frontal  shunt catheter again present and in similar position.   2.  No acute intracranial abnormality.   3.  RIGHT maxillary sinusitis.            Assessment/Plan:  I anticipate this patient is appropriate for observation status at this time.    * Tremor- (present on admission)  Assessment & Plan  Patient has history of seizure disorder, she presents with tremor  She does have an extensive seizure history  Rule out partial seizure, I ordered EEG  Continue Keppra, Depakote, and Neurontin at current doses    Hyponatremia- (present on admission)  Assessment & Plan  Acute on chronic  hyponatremia  I do not think this is the cause of her tremor  IV fluids, I ordered repeat labs in the morning to assess her response to treatment    history of pseudotumor cerebri- (present on admission)  Assessment & Plan  She is status post shunt    Seizures (HCC)- (present on admission)  Assessment & Plan  Continue keppra and depakote at current doses  EEG    Anxiety- (present on admission)  Assessment & Plan  Continue home dose of BuSpar      VTE prophylaxis: SCDs, the patient is observation status and ambulatory, thus hold off on chemoprophylaxis for DVT

## 2019-10-30 NOTE — ASSESSMENT & PLAN NOTE
Patient has history of seizure disorder, she presents with tremor  She does have an extensive seizure history  EEG is negative for seizures  Continue Keppra, Depakote, and Neurontin at current doses  Could be due to hyperammonemia from valproic acid toxicity

## 2019-10-30 NOTE — PROGRESS NOTES
"Pt alert and oriented times four. Reporting severe headache of 9 out 10.  Wearing \"ice cap\" at this time to help with headache. Fioricet added to prn pain meds. She is alert and oriented times four. On 3 liters of oxygen at this time. Needs met. Up to bathroom with standby assist. Plan of care discussed with pt and her . They voiced understanding. Call light and personal items within reach. Will monitor closely.    Pt has a chest Port. Port flushes smoothly but no blood return at this time. Will monitor.  "

## 2019-10-31 ENCOUNTER — APPOINTMENT (OUTPATIENT)
Dept: RADIOLOGY | Facility: MEDICAL CENTER | Age: 49
End: 2019-10-31
Attending: HOSPITALIST
Payer: MEDICARE

## 2019-10-31 PROBLEM — R74.01 TRANSAMINITIS: Status: ACTIVE | Noted: 2019-10-31

## 2019-10-31 LAB
ALBUMIN SERPL BCP-MCNC: 3.7 G/DL (ref 3.2–4.9)
ALBUMIN/GLOB SERPL: 1.8 G/DL
ALP SERPL-CCNC: 124 U/L (ref 30–99)
ALT SERPL-CCNC: 101 U/L (ref 2–50)
ANION GAP SERPL CALC-SCNC: 6 MMOL/L (ref 0–11.9)
AST SERPL-CCNC: 28 U/L (ref 12–45)
BASOPHILS # BLD AUTO: 1.8 % (ref 0–1.8)
BASOPHILS # BLD: 0.08 K/UL (ref 0–0.12)
BILIRUB SERPL-MCNC: 0.2 MG/DL (ref 0.1–1.5)
BUN SERPL-MCNC: 8 MG/DL (ref 8–22)
CALCIUM SERPL-MCNC: 8.4 MG/DL (ref 8.5–10.5)
CHLORIDE SERPL-SCNC: 102 MMOL/L (ref 96–112)
CO2 SERPL-SCNC: 29 MMOL/L (ref 20–33)
CREAT SERPL-MCNC: 0.55 MG/DL (ref 0.5–1.4)
EOSINOPHIL # BLD AUTO: 0.1 K/UL (ref 0–0.51)
EOSINOPHIL NFR BLD: 2.2 % (ref 0–6.9)
ERYTHROCYTE [DISTWIDTH] IN BLOOD BY AUTOMATED COUNT: 45.9 FL (ref 35.9–50)
GLOBULIN SER CALC-MCNC: 2.1 G/DL (ref 1.9–3.5)
GLUCOSE SERPL-MCNC: 88 MG/DL (ref 65–99)
HCT VFR BLD AUTO: 39.9 % (ref 37–47)
HGB BLD-MCNC: 13.1 G/DL (ref 12–16)
IMM GRANULOCYTES # BLD AUTO: 0.02 K/UL (ref 0–0.11)
IMM GRANULOCYTES NFR BLD AUTO: 0.4 % (ref 0–0.9)
LYMPHOCYTES # BLD AUTO: 1.95 K/UL (ref 1–4.8)
LYMPHOCYTES NFR BLD: 43.5 % (ref 22–41)
MAGNESIUM SERPL-MCNC: 1.9 MG/DL (ref 1.5–2.5)
MCH RBC QN AUTO: 31.9 PG (ref 27–33)
MCHC RBC AUTO-ENTMCNC: 32.8 G/DL (ref 33.6–35)
MCV RBC AUTO: 97.1 FL (ref 81.4–97.8)
MONOCYTES # BLD AUTO: 0.4 K/UL (ref 0–0.85)
MONOCYTES NFR BLD AUTO: 8.9 % (ref 0–13.4)
NEUTROPHILS # BLD AUTO: 1.93 K/UL (ref 2–7.15)
NEUTROPHILS NFR BLD: 43.2 % (ref 44–72)
NRBC # BLD AUTO: 0 K/UL
NRBC BLD-RTO: 0 /100 WBC
PHOSPHATE SERPL-MCNC: 5.5 MG/DL (ref 2.5–4.5)
PLATELET # BLD AUTO: 343 K/UL (ref 164–446)
PMV BLD AUTO: 9.7 FL (ref 9–12.9)
POTASSIUM SERPL-SCNC: 4.5 MMOL/L (ref 3.6–5.5)
PROT SERPL-MCNC: 5.8 G/DL (ref 6–8.2)
RBC # BLD AUTO: 4.11 M/UL (ref 4.2–5.4)
SODIUM SERPL-SCNC: 137 MMOL/L (ref 135–145)
VALPROATE SERPL-MCNC: 47.3 UG/ML (ref 50–100)
WBC # BLD AUTO: 4.5 K/UL (ref 4.8–10.8)

## 2019-10-31 PROCEDURE — 700102 HCHG RX REV CODE 250 W/ 637 OVERRIDE(OP): Performed by: HOSPITALIST

## 2019-10-31 PROCEDURE — G0378 HOSPITAL OBSERVATION PER HR: HCPCS

## 2019-10-31 PROCEDURE — 83735 ASSAY OF MAGNESIUM: CPT

## 2019-10-31 PROCEDURE — 99226 PR SUBSEQUENT OBSERVATION CARE,LEVEL III: CPT | Performed by: HOSPITALIST

## 2019-10-31 PROCEDURE — 80053 COMPREHEN METABOLIC PANEL: CPT

## 2019-10-31 PROCEDURE — A9270 NON-COVERED ITEM OR SERVICE: HCPCS | Performed by: HOSPITALIST

## 2019-10-31 PROCEDURE — 85025 COMPLETE CBC W/AUTO DIFF WBC: CPT

## 2019-10-31 PROCEDURE — 306581 SET INFUSION,POWERLOC 20G X 1: Performed by: HOSPITALIST

## 2019-10-31 PROCEDURE — 80164 ASSAY DIPROPYLACETIC ACD TOT: CPT

## 2019-10-31 PROCEDURE — 700111 HCHG RX REV CODE 636 W/ 250 OVERRIDE (IP): Performed by: HOSPITALIST

## 2019-10-31 PROCEDURE — 84100 ASSAY OF PHOSPHORUS: CPT

## 2019-10-31 PROCEDURE — 36415 COLL VENOUS BLD VENIPUNCTURE: CPT

## 2019-10-31 PROCEDURE — 306580 SET INFUSION,POWERLOC 20G X.75: Performed by: HOSPITALIST

## 2019-10-31 PROCEDURE — 700111 HCHG RX REV CODE 636 W/ 250 OVERRIDE (IP): Mod: JG | Performed by: INTERNAL MEDICINE

## 2019-10-31 PROCEDURE — 76700 US EXAM ABDOM COMPLETE: CPT

## 2019-10-31 PROCEDURE — 96376 TX/PRO/DX INJ SAME DRUG ADON: CPT

## 2019-10-31 RX ORDER — OXYCODONE HYDROCHLORIDE 5 MG/1
5 TABLET ORAL EVERY 4 HOURS PRN
Status: DISCONTINUED | OUTPATIENT
Start: 2019-10-31 | End: 2019-11-02 | Stop reason: HOSPADM

## 2019-10-31 RX ADMIN — DIVALPROEX SODIUM 250 MG: 250 TABLET, DELAYED RELEASE ORAL at 05:37

## 2019-10-31 RX ADMIN — LEVETIRACETAM 1500 MG: 500 TABLET ORAL at 05:36

## 2019-10-31 RX ADMIN — OMEPRAZOLE 20 MG: 20 CAPSULE, DELAYED RELEASE ORAL at 06:00

## 2019-10-31 RX ADMIN — SENNOSIDES AND DOCUSATE SODIUM 2 TABLET: 8.6; 5 TABLET ORAL at 05:36

## 2019-10-31 RX ADMIN — KETOROLAC TROMETHAMINE 30 MG: 30 INJECTION, SOLUTION INTRAMUSCULAR at 19:22

## 2019-10-31 RX ADMIN — LEVETIRACETAM 1500 MG: 500 TABLET ORAL at 10:53

## 2019-10-31 RX ADMIN — BUSPIRONE HYDROCHLORIDE 5 MG: 10 TABLET ORAL at 17:09

## 2019-10-31 RX ADMIN — LEVETIRACETAM 1500 MG: 500 TABLET ORAL at 17:08

## 2019-10-31 RX ADMIN — ASPIRIN 81 MG 81 MG: 81 TABLET ORAL at 05:37

## 2019-10-31 RX ADMIN — OXYCODONE HYDROCHLORIDE 5 MG: 5 TABLET ORAL at 14:57

## 2019-10-31 RX ADMIN — VITAMIN C 1 TABLET: TAB at 05:37

## 2019-10-31 RX ADMIN — GABAPENTIN 600 MG: 300 CAPSULE ORAL at 10:53

## 2019-10-31 RX ADMIN — DIPHENHYDRAMINE HYDROCHLORIDE 50 MG: 50 INJECTION INTRAMUSCULAR; INTRAVENOUS at 01:37

## 2019-10-31 RX ADMIN — OXYCODONE HYDROCHLORIDE 5 MG: 5 TABLET ORAL at 20:54

## 2019-10-31 RX ADMIN — DIPHENHYDRAMINE HYDROCHLORIDE 50 MG: 50 INJECTION INTRAMUSCULAR; INTRAVENOUS at 19:22

## 2019-10-31 RX ADMIN — LACTULOSE 30 ML: 20 SOLUTION ORAL at 17:08

## 2019-10-31 RX ADMIN — KETOROLAC TROMETHAMINE 30 MG: 30 INJECTION, SOLUTION INTRAMUSCULAR at 13:14

## 2019-10-31 RX ADMIN — BUSPIRONE HYDROCHLORIDE 5 MG: 10 TABLET ORAL at 05:37

## 2019-10-31 RX ADMIN — TRAMADOL HYDROCHLORIDE 50 MG: 50 TABLET ORAL at 22:43

## 2019-10-31 RX ADMIN — DIVALPROEX SODIUM 250 MG: 250 TABLET, DELAYED RELEASE ORAL at 14:59

## 2019-10-31 RX ADMIN — KETOROLAC TROMETHAMINE 30 MG: 30 INJECTION, SOLUTION INTRAMUSCULAR at 07:22

## 2019-10-31 RX ADMIN — DIPHENHYDRAMINE HYDROCHLORIDE 50 MG: 50 INJECTION INTRAMUSCULAR; INTRAVENOUS at 07:31

## 2019-10-31 RX ADMIN — LACTULOSE 30 ML: 20 SOLUTION ORAL at 05:37

## 2019-10-31 RX ADMIN — DIVALPROEX SODIUM 250 MG: 250 TABLET, DELAYED RELEASE ORAL at 22:43

## 2019-10-31 RX ADMIN — DIPHENHYDRAMINE HYDROCHLORIDE 50 MG: 50 INJECTION INTRAMUSCULAR; INTRAVENOUS at 13:13

## 2019-10-31 RX ADMIN — OXYCODONE HYDROCHLORIDE 5 MG: 5 TABLET ORAL at 10:51

## 2019-10-31 RX ADMIN — KETOROLAC TROMETHAMINE 30 MG: 30 INJECTION, SOLUTION INTRAMUSCULAR at 01:37

## 2019-10-31 RX ADMIN — TRAMADOL HYDROCHLORIDE 50 MG: 50 TABLET ORAL at 14:57

## 2019-10-31 RX ADMIN — MULTIPLE VITAMINS W/ MINERALS TAB 1 TABLET: TAB at 05:37

## 2019-10-31 RX ADMIN — DULOXETINE HYDROCHLORIDE 60 MG: 60 CAPSULE, DELAYED RELEASE ORAL at 17:09

## 2019-10-31 RX ADMIN — RISPERIDONE 2 MG: 0.5 TABLET ORAL at 05:36

## 2019-10-31 RX ADMIN — GABAPENTIN 600 MG: 300 CAPSULE ORAL at 05:36

## 2019-10-31 RX ADMIN — DULOXETINE HYDROCHLORIDE 60 MG: 60 CAPSULE, DELAYED RELEASE ORAL at 05:37

## 2019-10-31 RX ADMIN — LEVOCARNITINE 330 MG: 330 TABLET ORAL at 05:37

## 2019-10-31 RX ADMIN — RISPERIDONE 2 MG: 0.5 TABLET ORAL at 17:08

## 2019-10-31 RX ADMIN — GABAPENTIN 600 MG: 300 CAPSULE ORAL at 17:09

## 2019-10-31 RX ADMIN — LEVOCARNITINE 330 MG: 330 TABLET ORAL at 17:09

## 2019-10-31 RX ADMIN — ALTEPLASE 2 MG: 2.2 INJECTION, POWDER, LYOPHILIZED, FOR SOLUTION INTRAVENOUS at 22:43

## 2019-10-31 ASSESSMENT — ENCOUNTER SYMPTOMS
WEAKNESS: 0
BLOOD IN STOOL: 0
PND: 0
DIAPHORESIS: 0
NECK PAIN: 0
HALLUCINATIONS: 0
TREMORS: 1
VOMITING: 1
ORTHOPNEA: 0
FALLS: 0
DOUBLE VISION: 0
EYE PAIN: 0
CHILLS: 0
ABDOMINAL PAIN: 0
SORE THROAT: 0
BRUISES/BLEEDS EASILY: 0
HEADACHES: 0
HEARTBURN: 0
FEVER: 0
DIZZINESS: 1
COUGH: 0
PALPITATIONS: 0
DEPRESSION: 0
POLYDIPSIA: 0
SPUTUM PRODUCTION: 0
SHORTNESS OF BREATH: 0
SINUS PAIN: 0
HEMOPTYSIS: 0
STRIDOR: 0
DIARRHEA: 0
WHEEZING: 0
PHOTOPHOBIA: 0
FLANK PAIN: 0
BACK PAIN: 0
NAUSEA: 1
MYALGIAS: 0
CLAUDICATION: 0
TINGLING: 0
BLURRED VISION: 0
CONSTIPATION: 0

## 2019-10-31 ASSESSMENT — LIFESTYLE VARIABLES: SUBSTANCE_ABUSE: 0

## 2019-10-31 NOTE — PROGRESS NOTES
Hospital Medicine Daily Progress Note    Date of Service  10/31/2019    Chief Complaint  49 y.o. female admitted 10/29/2019 with history of pseudotumor cerebri, status post  shunt, history of seizure disorders, chronic headaches comes at the emergency room with complaints of tremors.  Tremors are no nonintentional and happens in all 4 extremities.    Hospital Course    Upon arrival to the emergency room her blood pressure was 152/97, pulse 114.  Sodium was 127, calcium 7.8, anion gap 10, TSH 2.9, valproic acid 95, magnesium 1.4.  EEG found toxic encephalopathy      Interval Problem Update  10/30: Patient continues complains about headaches and tremors.  I have prescribed her tramadol.  Her ammonia levels were elevated to 48.  I prescribed her carnitine and lactulose and reduce her dose of valproic acid to 250 mg  10/31: She seen and examined me today complains of migraine headaches.  She was given O2 therapy, Toradol, Fioricet.  I will continue to monitor ammonia levels, LFTs.    Consultants/Specialty  None    Code Status  full    Disposition  None    Review of Systems  Review of Systems   Constitutional: Positive for malaise/fatigue. Negative for chills, diaphoresis and fever.   HENT: Negative for congestion, ear discharge, ear pain, hearing loss, nosebleeds, sinus pain, sore throat and tinnitus.    Eyes: Negative for blurred vision, double vision, photophobia and pain.   Respiratory: Negative for cough, hemoptysis, sputum production, shortness of breath, wheezing and stridor.    Cardiovascular: Negative for chest pain, palpitations, orthopnea, claudication, leg swelling and PND.   Gastrointestinal: Positive for nausea and vomiting. Negative for abdominal pain, blood in stool, constipation, diarrhea, heartburn and melena.   Genitourinary: Negative for dysuria, flank pain, frequency, hematuria and urgency.   Musculoskeletal: Negative for back pain, falls, joint pain, myalgias and neck pain.   Skin: Negative for  itching and rash.   Neurological: Positive for dizziness and tremors. Negative for tingling, weakness and headaches.   Endo/Heme/Allergies: Negative for environmental allergies and polydipsia. Does not bruise/bleed easily.   Psychiatric/Behavioral: Negative for depression, hallucinations, substance abuse and suicidal ideas.        Physical Exam  Temp:  [36.5 °C (97.7 °F)-36.7 °C (98 °F)] 36.6 °C (97.9 °F)  Pulse:  [] 73  Resp:  [17-18] 17  BP: ()/(53-73) 99/54  SpO2:  [95 %-100 %] 96 %    Physical Exam   Constitutional: She is oriented to person, place, and time. She appears well-developed and well-nourished.   HENT:   Head: Normocephalic and atraumatic.   Mouth/Throat: No oropharyngeal exudate.   Eyes: Pupils are equal, round, and reactive to light. EOM are normal. No scleral icterus.   Neck: Normal range of motion. Neck supple. No JVD present. No tracheal deviation present. No thyromegaly present.   Cardiovascular: Normal rate, regular rhythm and intact distal pulses. Exam reveals no gallop and no friction rub.   No murmur heard.  Pulmonary/Chest: Effort normal and breath sounds normal. No stridor. No respiratory distress. She has no wheezes. She has no rales. She exhibits no tenderness.   Abdominal: Soft. Bowel sounds are normal. She exhibits no distension and no mass. There is no tenderness. There is no rebound and no guarding.   Musculoskeletal: Normal range of motion. She exhibits no edema.   Lymphadenopathy:     She has no cervical adenopathy.   Neurological: She is alert and oriented to person, place, and time. She has normal reflexes. No cranial nerve deficit. Coordination normal.   Bilateral arm tremors-nonintentional  Cerebellar exam is normal  5 out of 5 strength in all 4 extremities  No sensory loss   Skin: No rash noted. No erythema. No pallor.   Nursing note and vitals reviewed.      Fluids    Intake/Output Summary (Last 24 hours) at 10/31/2019 6212  Last data filed at 10/31/2019  0900  Gross per 24 hour   Intake 840 ml   Output --   Net 840 ml       Laboratory  Recent Labs     10/29/19  1009 10/30/19  0419 10/31/19  0407   WBC 9.1 5.5 4.5*   RBC 3.83* 3.83* 4.11*   HEMOGLOBIN 11.9* 12.2 13.1   HEMATOCRIT 37.1 37.1 39.9   MCV 96.9 96.9 97.1   MCH 31.1 31.9 31.9   MCHC 32.1* 32.9* 32.8*   RDW 44.1 44.6 45.9   PLATELETCT 336 319 343   MPV 9.4 9.7 9.7     Recent Labs     10/29/19  1009 10/30/19  0419 10/31/19  0407   SODIUM 127* 130* 137   POTASSIUM 3.9 4.1 4.5   CHLORIDE 90* 97 102   CO2 27 24 29   GLUCOSE 104* 80 88   BUN 3* 7* 8   CREATININE 0.59 0.53 0.55   CALCIUM 7.8* 7.8* 8.4*                   Imaging  CT-HEAD W/O   Final Result      1.  RIGHT frontal  shunt catheter again present and in similar position.   2.  No acute intracranial abnormality.   3.  RIGHT maxillary sinusitis.      US-ABDOMEN COMPLETE SURVEY    (Results Pending)        Assessment/Plan  * Valproic acid toxicity  Assessment & Plan  It is likely due to a chronic exposure of valproic acid is causing her tremors  Hyperammonia-we will give carnitine and lactulose  Reduced Depakote level to to 250mg   Trend LFTs    Tremor- (present on admission)  Assessment & Plan  Patient has history of seizure disorder, she presents with tremor  She does have an extensive seizure history  EEG is negative for seizures  Continue Keppra, Depakote, and Neurontin at current doses  Could be due to hyperammonemia from valproic acid toxicity    Hyponatremia- (present on admission)  Assessment & Plan  Acute on chronic hypovolumic hyponatremia-resolved    Acute encephalopathy  Assessment & Plan  Likely secondary to chronic Depakote exposure    history of pseudotumor cerebri- (present on admission)  Assessment & Plan  She is status post shunt  Patient is complaining about headaches but procalcitonin is normal and patient has no abdominal pathology.  Reports of no fevers or neck stiffness.  I think infection of the  shunt is highly  unlikely    Seizures (HCC)- (present on admission)  Assessment & Plan  Continue keppra and depakote at current doses  EEG    Transaminitis  Assessment & Plan  Likely related to valproic acid toxicity  Ordered a ultrasound abdomen    Anxiety- (present on admission)  Assessment & Plan  Continue home dose of BuSpar       VTE prophylaxis: scd

## 2019-10-31 NOTE — CARE PLAN
Problem: Communication  Goal: The ability to communicate needs accurately and effectively will improve  Outcome: PROGRESSING AS EXPECTED  Plan of care discussed, including CHG bathe, floor routine etc     Problem: Safety  Goal: Will remain free from injury  Outcome: PROGRESSING AS EXPECTED  Call light within reach. Bed alarm on. Frequent rounding by staff     Problem: Pain Management  Goal: Pain level will decrease to patient's comfort goal  Outcome: PROGRESSING AS EXPECTED  Prn pain meds administered. Frequency discussed

## 2019-10-31 NOTE — PROGRESS NOTES
Pt alert and oriented times four. Toradol and benadryl administered for headache and right shoulder pain. Denies chest pain, difficulty breathing, dizziness, nausea, numbness and tingling. Plan of care including call light, bed alarm, hourly rounding discussed. Voiced understanding. Call light and personal items within reach. Bed alarm on. Will monitor.    Chest port noted. CHG bathe discussed with pt.    0550  Pain controlled. Slept well. Asymptomatic decreased blood pressure this morning, 87/55 mmhg, 87/70 mmhg, in the left upper arm, 100/65 mmhg in the right upper arm. Will monitor.

## 2019-10-31 NOTE — PROGRESS NOTES
Patient awake, alert, and an active participant in bedside report.  She ambulated with standby assistance to the restroom.  Non pharmacologic comfort measures provided, ice cap in place.  Safety measures in place, call light in reach.

## 2019-11-01 LAB
ALBUMIN SERPL BCP-MCNC: 3.5 G/DL (ref 3.2–4.9)
ALBUMIN/GLOB SERPL: 1.5 G/DL
ALP SERPL-CCNC: 115 U/L (ref 30–99)
ALT SERPL-CCNC: 70 U/L (ref 2–50)
AMMONIA PLAS-SCNC: 73 UMOL/L (ref 11–45)
ANION GAP SERPL CALC-SCNC: 7 MMOL/L (ref 0–11.9)
AST SERPL-CCNC: 17 U/L (ref 12–45)
BASOPHILS # BLD AUTO: 1.5 % (ref 0–1.8)
BASOPHILS # BLD: 0.08 K/UL (ref 0–0.12)
BILIRUB SERPL-MCNC: 0.2 MG/DL (ref 0.1–1.5)
BUN SERPL-MCNC: 11 MG/DL (ref 8–22)
CALCIUM SERPL-MCNC: 8.6 MG/DL (ref 8.5–10.5)
CHLORIDE SERPL-SCNC: 99 MMOL/L (ref 96–112)
CO2 SERPL-SCNC: 31 MMOL/L (ref 20–33)
CREAT SERPL-MCNC: 0.61 MG/DL (ref 0.5–1.4)
EOSINOPHIL # BLD AUTO: 0.13 K/UL (ref 0–0.51)
EOSINOPHIL NFR BLD: 2.5 % (ref 0–6.9)
ERYTHROCYTE [DISTWIDTH] IN BLOOD BY AUTOMATED COUNT: 43.9 FL (ref 35.9–50)
GLOBULIN SER CALC-MCNC: 2.3 G/DL (ref 1.9–3.5)
GLUCOSE SERPL-MCNC: 91 MG/DL (ref 65–99)
HCT VFR BLD AUTO: 38.5 % (ref 37–47)
HGB BLD-MCNC: 12.8 G/DL (ref 12–16)
IMM GRANULOCYTES # BLD AUTO: 0.02 K/UL (ref 0–0.11)
IMM GRANULOCYTES NFR BLD AUTO: 0.4 % (ref 0–0.9)
LYMPHOCYTES # BLD AUTO: 1.92 K/UL (ref 1–4.8)
LYMPHOCYTES NFR BLD: 36.9 % (ref 22–41)
MAGNESIUM SERPL-MCNC: 2 MG/DL (ref 1.5–2.5)
MCH RBC QN AUTO: 31.8 PG (ref 27–33)
MCHC RBC AUTO-ENTMCNC: 33.2 G/DL (ref 33.6–35)
MCV RBC AUTO: 95.5 FL (ref 81.4–97.8)
MONOCYTES # BLD AUTO: 0.45 K/UL (ref 0–0.85)
MONOCYTES NFR BLD AUTO: 8.7 % (ref 0–13.4)
NEUTROPHILS # BLD AUTO: 2.6 K/UL (ref 2–7.15)
NEUTROPHILS NFR BLD: 50 % (ref 44–72)
NRBC # BLD AUTO: 0 K/UL
NRBC BLD-RTO: 0 /100 WBC
PHOSPHATE SERPL-MCNC: 6.7 MG/DL (ref 2.5–4.5)
PLATELET # BLD AUTO: 360 K/UL (ref 164–446)
PMV BLD AUTO: 9.7 FL (ref 9–12.9)
POTASSIUM SERPL-SCNC: 4.7 MMOL/L (ref 3.6–5.5)
PROT SERPL-MCNC: 5.8 G/DL (ref 6–8.2)
RBC # BLD AUTO: 4.03 M/UL (ref 4.2–5.4)
SODIUM SERPL-SCNC: 137 MMOL/L (ref 135–145)
VALPROATE SERPL-MCNC: 31.9 UG/ML (ref 50–100)
WBC # BLD AUTO: 5.2 K/UL (ref 4.8–10.8)

## 2019-11-01 PROCEDURE — 700111 HCHG RX REV CODE 636 W/ 250 OVERRIDE (IP): Performed by: HOSPITALIST

## 2019-11-01 PROCEDURE — 96365 THER/PROPH/DIAG IV INF INIT: CPT

## 2019-11-01 PROCEDURE — 700101 HCHG RX REV CODE 250: Performed by: HOSPITALIST

## 2019-11-01 PROCEDURE — 700102 HCHG RX REV CODE 250 W/ 637 OVERRIDE(OP): Performed by: HOSPITALIST

## 2019-11-01 PROCEDURE — 84100 ASSAY OF PHOSPHORUS: CPT

## 2019-11-01 PROCEDURE — 83735 ASSAY OF MAGNESIUM: CPT

## 2019-11-01 PROCEDURE — A9270 NON-COVERED ITEM OR SERVICE: HCPCS | Performed by: HOSPITALIST

## 2019-11-01 PROCEDURE — 700105 HCHG RX REV CODE 258: Performed by: HOSPITALIST

## 2019-11-01 PROCEDURE — 700111 HCHG RX REV CODE 636 W/ 250 OVERRIDE (IP): Performed by: INTERNAL MEDICINE

## 2019-11-01 PROCEDURE — 85025 COMPLETE CBC W/AUTO DIFF WBC: CPT

## 2019-11-01 PROCEDURE — 96376 TX/PRO/DX INJ SAME DRUG ADON: CPT

## 2019-11-01 PROCEDURE — 82140 ASSAY OF AMMONIA: CPT

## 2019-11-01 PROCEDURE — 99226 PR SUBSEQUENT OBSERVATION CARE,LEVEL III: CPT | Performed by: HOSPITALIST

## 2019-11-01 PROCEDURE — 80164 ASSAY DIPROPYLACETIC ACD TOT: CPT

## 2019-11-01 PROCEDURE — G0378 HOSPITAL OBSERVATION PER HR: HCPCS

## 2019-11-01 PROCEDURE — 96375 TX/PRO/DX INJ NEW DRUG ADDON: CPT

## 2019-11-01 PROCEDURE — 80053 COMPREHEN METABOLIC PANEL: CPT

## 2019-11-01 RX ORDER — MAGNESIUM SULFATE 1 G/100ML
1 INJECTION INTRAVENOUS ONCE
Status: COMPLETED | OUTPATIENT
Start: 2019-11-01 | End: 2019-11-01

## 2019-11-01 RX ORDER — KETAMINE HYDROCHLORIDE 50 MG/ML
0.5 INJECTION, SOLUTION INTRAMUSCULAR; INTRAVENOUS ONCE
Status: COMPLETED | OUTPATIENT
Start: 2019-11-01 | End: 2019-11-01

## 2019-11-01 RX ORDER — HYDROMORPHONE HYDROCHLORIDE 1 MG/ML
1 INJECTION, SOLUTION INTRAMUSCULAR; INTRAVENOUS; SUBCUTANEOUS EVERY 4 HOURS PRN
Status: DISCONTINUED | OUTPATIENT
Start: 2019-11-01 | End: 2019-11-02 | Stop reason: HOSPADM

## 2019-11-01 RX ORDER — SODIUM CHLORIDE 9 MG/ML
500 INJECTION, SOLUTION INTRAVENOUS ONCE
Status: COMPLETED | OUTPATIENT
Start: 2019-11-01 | End: 2019-11-01

## 2019-11-01 RX ORDER — HYDROMORPHONE HYDROCHLORIDE 2 MG/1
2 TABLET ORAL
Status: DISCONTINUED | OUTPATIENT
Start: 2019-11-01 | End: 2019-11-02 | Stop reason: HOSPADM

## 2019-11-01 RX ORDER — DIVALPROEX SODIUM 500 MG/1
500 TABLET, DELAYED RELEASE ORAL
Status: DISCONTINUED | OUTPATIENT
Start: 2019-11-01 | End: 2019-11-02 | Stop reason: HOSPADM

## 2019-11-01 RX ORDER — HYDROMORPHONE HYDROCHLORIDE 1 MG/ML
0.5 INJECTION, SOLUTION INTRAMUSCULAR; INTRAVENOUS; SUBCUTANEOUS ONCE
Status: COMPLETED | OUTPATIENT
Start: 2019-11-01 | End: 2019-11-01

## 2019-11-01 RX ORDER — DIAZEPAM 5 MG/ML
2.5 INJECTION, SOLUTION INTRAMUSCULAR; INTRAVENOUS ONCE
Status: COMPLETED | OUTPATIENT
Start: 2019-11-01 | End: 2019-11-01

## 2019-11-01 RX ORDER — METOCLOPRAMIDE HYDROCHLORIDE 5 MG/ML
10 INJECTION INTRAMUSCULAR; INTRAVENOUS EVERY 6 HOURS
Status: DISCONTINUED | OUTPATIENT
Start: 2019-11-01 | End: 2019-11-02 | Stop reason: HOSPADM

## 2019-11-01 RX ORDER — DIVALPROEX SODIUM 250 MG/1
250 TABLET, DELAYED RELEASE ORAL EVERY 12 HOURS
Status: DISCONTINUED | OUTPATIENT
Start: 2019-11-02 | End: 2019-11-02 | Stop reason: HOSPADM

## 2019-11-01 RX ADMIN — DIVALPROEX SODIUM 500 MG: 500 TABLET, DELAYED RELEASE ORAL at 19:47

## 2019-11-01 RX ADMIN — DIVALPROEX SODIUM 250 MG: 250 TABLET, DELAYED RELEASE ORAL at 04:55

## 2019-11-01 RX ADMIN — LEVOCARNITINE 330 MG: 330 TABLET ORAL at 18:23

## 2019-11-01 RX ADMIN — SENNOSIDES AND DOCUSATE SODIUM 2 TABLET: 8.6; 5 TABLET ORAL at 17:01

## 2019-11-01 RX ADMIN — KETOROLAC TROMETHAMINE 30 MG: 30 INJECTION, SOLUTION INTRAMUSCULAR at 01:23

## 2019-11-01 RX ADMIN — GABAPENTIN 600 MG: 300 CAPSULE ORAL at 04:48

## 2019-11-01 RX ADMIN — HYDROMORPHONE HYDROCHLORIDE 1 MG: 1 INJECTION, SOLUTION INTRAMUSCULAR; INTRAVENOUS; SUBCUTANEOUS at 19:47

## 2019-11-01 RX ADMIN — RISPERIDONE 2 MG: 0.5 TABLET ORAL at 17:01

## 2019-11-01 RX ADMIN — GABAPENTIN 600 MG: 300 CAPSULE ORAL at 10:50

## 2019-11-01 RX ADMIN — DIAZEPAM 2.5 MG: 5 INJECTION, SOLUTION INTRAMUSCULAR; INTRAVENOUS at 12:04

## 2019-11-01 RX ADMIN — DIPHENHYDRAMINE HYDROCHLORIDE 50 MG: 50 INJECTION INTRAMUSCULAR; INTRAVENOUS at 20:58

## 2019-11-01 RX ADMIN — OMEPRAZOLE 20 MG: 20 CAPSULE, DELAYED RELEASE ORAL at 04:48

## 2019-11-01 RX ADMIN — BUSPIRONE HYDROCHLORIDE 5 MG: 10 TABLET ORAL at 17:01

## 2019-11-01 RX ADMIN — HYDROMORPHONE HYDROCHLORIDE 1 MG: 1 INJECTION, SOLUTION INTRAMUSCULAR; INTRAVENOUS; SUBCUTANEOUS at 23:53

## 2019-11-01 RX ADMIN — DIVALPROEX SODIUM 250 MG: 250 TABLET, DELAYED RELEASE ORAL at 13:01

## 2019-11-01 RX ADMIN — OXYCODONE HYDROCHLORIDE 5 MG: 5 TABLET ORAL at 09:31

## 2019-11-01 RX ADMIN — LEVOCARNITINE 330 MG: 330 TABLET ORAL at 04:49

## 2019-11-01 RX ADMIN — METOCLOPRAMIDE 10 MG: 5 INJECTION, SOLUTION INTRAMUSCULAR; INTRAVENOUS at 11:59

## 2019-11-01 RX ADMIN — ASPIRIN 81 MG 81 MG: 81 TABLET ORAL at 04:48

## 2019-11-01 RX ADMIN — LEVETIRACETAM 1500 MG: 500 TABLET ORAL at 17:01

## 2019-11-01 RX ADMIN — KETAMINE HYDROCHLORIDE 41 MG: 50 INJECTION, SOLUTION INTRAMUSCULAR; INTRAVENOUS at 11:59

## 2019-11-01 RX ADMIN — LACTULOSE 30 ML: 20 SOLUTION ORAL at 17:00

## 2019-11-01 RX ADMIN — GABAPENTIN 600 MG: 300 CAPSULE ORAL at 17:01

## 2019-11-01 RX ADMIN — MULTIPLE VITAMINS W/ MINERALS TAB 1 TABLET: TAB at 04:48

## 2019-11-01 RX ADMIN — DULOXETINE HYDROCHLORIDE 60 MG: 60 CAPSULE, DELAYED RELEASE ORAL at 04:47

## 2019-11-01 RX ADMIN — VITAMIN C 1 TABLET: TAB at 04:47

## 2019-11-01 RX ADMIN — SODIUM CHLORIDE 500 ML: 9 INJECTION, SOLUTION INTRAVENOUS at 10:45

## 2019-11-01 RX ADMIN — DULOXETINE HYDROCHLORIDE 60 MG: 60 CAPSULE, DELAYED RELEASE ORAL at 17:01

## 2019-11-01 RX ADMIN — KETOROLAC TROMETHAMINE 30 MG: 30 INJECTION, SOLUTION INTRAMUSCULAR at 13:56

## 2019-11-01 RX ADMIN — DIPHENHYDRAMINE HYDROCHLORIDE 50 MG: 50 INJECTION INTRAMUSCULAR; INTRAVENOUS at 07:29

## 2019-11-01 RX ADMIN — DIPHENHYDRAMINE HYDROCHLORIDE 50 MG: 50 INJECTION INTRAMUSCULAR; INTRAVENOUS at 13:56

## 2019-11-01 RX ADMIN — OXYCODONE HYDROCHLORIDE 5 MG: 5 TABLET ORAL at 04:48

## 2019-11-01 RX ADMIN — HYDROMORPHONE HYDROCHLORIDE 1 MG: 1 INJECTION, SOLUTION INTRAMUSCULAR; INTRAVENOUS; SUBCUTANEOUS at 15:13

## 2019-11-01 RX ADMIN — MAGNESIUM SULFATE IN DEXTROSE 1 G: 10 INJECTION, SOLUTION INTRAVENOUS at 11:41

## 2019-11-01 RX ADMIN — METOCLOPRAMIDE 10 MG: 5 INJECTION, SOLUTION INTRAMUSCULAR; INTRAVENOUS at 17:00

## 2019-11-01 RX ADMIN — LEVETIRACETAM 1500 MG: 500 TABLET ORAL at 10:50

## 2019-11-01 RX ADMIN — DIPHENHYDRAMINE HYDROCHLORIDE 50 MG: 50 INJECTION INTRAMUSCULAR; INTRAVENOUS at 01:23

## 2019-11-01 RX ADMIN — KETOROLAC TROMETHAMINE 30 MG: 30 INJECTION, SOLUTION INTRAMUSCULAR at 07:29

## 2019-11-01 RX ADMIN — METOCLOPRAMIDE 10 MG: 5 INJECTION, SOLUTION INTRAMUSCULAR; INTRAVENOUS at 23:53

## 2019-11-01 RX ADMIN — KETOROLAC TROMETHAMINE 30 MG: 30 INJECTION, SOLUTION INTRAMUSCULAR at 20:58

## 2019-11-01 RX ADMIN — LACTULOSE 30 ML: 20 SOLUTION ORAL at 04:47

## 2019-11-01 RX ADMIN — HYDROMORPHONE HYDROCHLORIDE 0.5 MG: 1 INJECTION, SOLUTION INTRAMUSCULAR; INTRAVENOUS; SUBCUTANEOUS at 01:59

## 2019-11-01 RX ADMIN — BUSPIRONE HYDROCHLORIDE 5 MG: 10 TABLET ORAL at 04:47

## 2019-11-01 RX ADMIN — RISPERIDONE 2 MG: 0.5 TABLET ORAL at 04:47

## 2019-11-01 RX ADMIN — LEVETIRACETAM 1500 MG: 500 TABLET ORAL at 04:47

## 2019-11-01 ASSESSMENT — ENCOUNTER SYMPTOMS
CLAUDICATION: 0
SHORTNESS OF BREATH: 0
ABDOMINAL PAIN: 0
CHILLS: 0
BLURRED VISION: 0
POLYDIPSIA: 0
DIZZINESS: 1
CONSTIPATION: 0
SINUS PAIN: 0
TINGLING: 0
FLANK PAIN: 0
ORTHOPNEA: 0
DIAPHORESIS: 0
COUGH: 0
FALLS: 0
BLOOD IN STOOL: 0
HEARTBURN: 0
STRIDOR: 0
TREMORS: 1
HEADACHES: 0
NAUSEA: 1
DIARRHEA: 0
MYALGIAS: 0
SPUTUM PRODUCTION: 0
WHEEZING: 0
PND: 0
PALPITATIONS: 0
PHOTOPHOBIA: 0
NECK PAIN: 0
DEPRESSION: 0
BACK PAIN: 0
EYE PAIN: 0
HALLUCINATIONS: 0
FEVER: 0
WEAKNESS: 0
DOUBLE VISION: 0
VOMITING: 1
BRUISES/BLEEDS EASILY: 0
HEMOPTYSIS: 0
SORE THROAT: 0

## 2019-11-01 ASSESSMENT — LIFESTYLE VARIABLES: SUBSTANCE_ABUSE: 0

## 2019-11-01 NOTE — PROGRESS NOTES
[facility] Medicine Daily Progress Note    Date of Service  11/1/2019    Chief Complaint  49 y.o. female admitted 10/29/2019 with history of pseudotumor cerebri, status post  shunt, history of seizure disorders, chronic headaches comes at the emergency room with complaints of tremors.  Tremors are no nonintentional and happens in all 4 extremities.    Hospital Course    Upon arrival to the emergency room her blood pressure was 152/97, pulse 114.  Sodium was 127, calcium 7.8, anion gap 10, TSH 2.9, valproic acid 95, magnesium 1.4.  EEG found toxic encephalopathy      Interval Problem Update  10/30: Patient continues complains about headaches and tremors.  I have prescribed her tramadol.  Her ammonia levels were elevated to 48.  I prescribed her carnitine and lactulose and reduce her dose of valproic acid to 250 mg  10/31: She seen and examined me today complains of migraine headaches.  She was given O2 therapy, Toradol, Fioricet.  I will continue to monitor ammonia levels, LFTs.  11/1: LFTs are improving, ammonia level is increased.  Increased her Depakote levels to make it therapeutic.  Patient is receiving high levels of narcotics for her headaches however monitor her mental respiratory status closely.  She said it improved at these medication.  Anticipate discharge tomorrow.    Consultants/Specialty  None    Code Status  full    Disposition  None    Review of Systems  Review of Systems   Constitutional: Positive for malaise/fatigue. Negative for chills, diaphoresis and fever.   HENT: Negative for congestion, ear discharge, ear pain, hearing loss, nosebleeds, sinus pain, sore throat and tinnitus.    Eyes: Negative for blurred vision, double vision, photophobia and pain.   Respiratory: Negative for cough, hemoptysis, sputum production, shortness of breath, wheezing and stridor.    Cardiovascular: Negative for chest pain, palpitations, orthopnea, claudication, leg swelling and PND.   Gastrointestinal: Positive for  nausea and vomiting. Negative for abdominal pain, blood in stool, constipation, diarrhea, heartburn and melena.   Genitourinary: Negative for dysuria, flank pain, frequency, hematuria and urgency.   Musculoskeletal: Negative for back pain, falls, joint pain, myalgias and neck pain.   Skin: Negative for itching and rash.   Neurological: Positive for dizziness and tremors. Negative for tingling, weakness and headaches.   Endo/Heme/Allergies: Negative for environmental allergies and polydipsia. Does not bruise/bleed easily.   Psychiatric/Behavioral: Negative for depression, hallucinations, substance abuse and suicidal ideas.        Physical Exam  Temp:  [36.1 °C (97 °F)-37.1 °C (98.7 °F)] 36.6 °C (97.9 °F)  Pulse:  [] 85  Resp:  [16-18] 17  BP: ()/(44-96) 135/96  SpO2:  [94 %-100 %] 95 %    Physical Exam   Constitutional: She is oriented to person, place, and time. She appears well-developed and well-nourished.   HENT:   Head: Normocephalic and atraumatic.   Mouth/Throat: No oropharyngeal exudate.   Eyes: Pupils are equal, round, and reactive to light. EOM are normal. No scleral icterus.   Neck: Normal range of motion. Neck supple. No JVD present. No tracheal deviation present. No thyromegaly present.   Cardiovascular: Normal rate, regular rhythm and intact distal pulses. Exam reveals no gallop and no friction rub.   No murmur heard.  Pulmonary/Chest: Effort normal and breath sounds normal. No stridor. No respiratory distress. She has no wheezes. She has no rales. She exhibits no tenderness.   Abdominal: Soft. Bowel sounds are normal. She exhibits no distension and no mass. There is no tenderness. There is no rebound and no guarding.   Musculoskeletal: Normal range of motion. She exhibits no edema.   Lymphadenopathy:     She has no cervical adenopathy.   Neurological: She is alert and oriented to person, place, and time. She has normal reflexes. No cranial nerve deficit. Coordination normal.   Bilateral arm  tremors-nonintentional  Cerebellar exam is normal  5 out of 5 strength in all 4 extremities  No sensory loss   Skin: No rash noted. No erythema. No pallor.   Nursing note and vitals reviewed.      Fluids    Intake/Output Summary (Last 24 hours) at 11/1/2019 1427  Last data filed at 11/1/2019 1200  Gross per 24 hour   Intake 240 ml   Output --   Net 240 ml       Laboratory  Recent Labs     10/30/19  0419 10/31/19  0407 11/01/19  0505   WBC 5.5 4.5* 5.2   RBC 3.83* 4.11* 4.03*   HEMOGLOBIN 12.2 13.1 12.8   HEMATOCRIT 37.1 39.9 38.5   MCV 96.9 97.1 95.5   MCH 31.9 31.9 31.8   MCHC 32.9* 32.8* 33.2*   RDW 44.6 45.9 43.9   PLATELETCT 319 343 360   MPV 9.7 9.7 9.7     Recent Labs     10/30/19  0419 10/31/19  0407 11/01/19  0505   SODIUM 130* 137 137   POTASSIUM 4.1 4.5 4.7   CHLORIDE 97 102 99   CO2 24 29 31   GLUCOSE 80 88 91   BUN 7* 8 11   CREATININE 0.53 0.55 0.61   CALCIUM 7.8* 8.4* 8.6                   Imaging  US-ABDOMEN COMPLETE SURVEY   Final Result      1.  Unremarkable abdominal ultrasound.         CT-HEAD W/O   Final Result      1.  RIGHT frontal  shunt catheter again present and in similar position.   2.  No acute intracranial abnormality.   3.  RIGHT maxillary sinusitis.           Assessment/Plan  * Valproic acid toxicity  Assessment & Plan  It is likely due to a chronic exposure of valproic acid is causing her tremors  Hyperammonia-we will give carnitine and lactulose  Reduced Depakote level to to 250mg during morning and evening with 500 mg at night.  Trend LFTs- improving     Tremor- (present on admission)  Assessment & Plan  Patient has history of seizure disorder, she presents with tremor  She does have an extensive seizure history  EEG is negative for seizures  Continue Keppra, Depakote, and Neurontin at current doses  Could be due to hyperammonemia from valproic acid toxicity    Hyponatremia- (present on admission)  Assessment & Plan  Acute on chronic hypovolumic hyponatremia-resolved    Acute  encephalopathy  Assessment & Plan  Likely secondary to chronic Depakote exposure    history of pseudotumor cerebri- (present on admission)  Assessment & Plan  She is status post shunt  Patient is complaining about headaches but procalcitonin is normal and patient has no abdominal pathology.  Reports of no fevers or neck stiffness.  I think infection of the  shunt is highly unlikely    Seizures (HCC)- (present on admission)  Assessment & Plan  Continue keppra and depakote at current doses  EEG    Intractable migraine with aura with status migrainosus  Assessment & Plan  Patient given IV ketamine, IV magnesium, Reglan, Valium, IV bolus  Oral Dilaudid has been prescribed  Patient receiving high levels of IV and oral narcotics to monitor her mental respiratory status closely    Transaminitis  Assessment & Plan  Likely related to valproic acid toxicity  Ordered a ultrasound abdomen- no acute findings     Anxiety- (present on admission)  Assessment & Plan  Continue home dose of BuSpar       VTE prophylaxis: scd

## 2019-11-01 NOTE — ASSESSMENT & PLAN NOTE
Patient given IV ketamine, IV magnesium, Reglan, Valium, IV bolus  Oral Dilaudid has been prescribed  Patient receiving high levels of IV and oral narcotics to monitor her mental respiratory status closely

## 2019-11-01 NOTE — PROGRESS NOTES
Patient oriented times 4. VSS. Patient ambulates to BR by self. Call light within reach. Patient has chronic H/A with pain medication given per MAR.  at bedside earlier today. Skin warm and dry.

## 2019-11-01 NOTE — CARE PLAN
Problem: Safety  Goal: Will remain free from injury  Outcome: PROGRESSING AS EXPECTED  Note:   Fall precautions in place.     Problem: Bowel/Gastric:  Goal: Normal bowel function is maintained or improved  Outcome: PROGRESSING AS EXPECTED  Note:   Multiple BMs 10/31. On lactulose.     Problem: Pain Management  Goal: Pain level will decrease to patient's comfort goal  Outcome: PROGRESSING SLOWER THAN EXPECTED  Note:   Pt c/o severe, uncontrolled headaches. MD aware. PRN meds as ordered.

## 2019-11-01 NOTE — DISCHARGE PLANNING
Care Transition Team Assessment    The information provided for this assessment was provided by pt's spouse, Benoit and chart review. Pt resting at the time of this assessment. Pt lives on the second floor of the apartment complex. Pt lives with her spouse and son. Pt is disabled and receives disability benefits. Pt has a good support system. Spouse states that prior to admit pt was independent with ADL's/IADL's. Pt's insurance covers medications. Pt uses the [facility] pharmacy off of [address].     Pt discussed during rounds. No needs at this time.     Information Source  Orientation : Oriented x 4  Information Given By: Spouse  Informant's Name: (Benoit Pang)  Who is responsible for making decisions for patient? : Patient    Elopement Risk  Legal Hold: No  Ambulatory or Self Mobile in Wheelchair: No-Not an Elopement Risk  Disoriented: No  Psychiatric Symptoms: None  History of Wandering: No  Elopement this Admit: No  Vocalizing Wanting to Leave: No  Displays Behaviors, Body Language Wanting to Leave: No-Not at Risk for Elopement  Elopement Risk: Not at Risk for Elopement    Interdisciplinary Discharge Planning  Patient or legal guardian wants to designate a caregiver (see row info): No    Discharge Preparedness  What is your plan after discharge?: Uncertain - pending medical team collaboration, Home with help  What are your discharge supports?: Spouse  Prior Functional Level: Independent with Activities of Daily Living, Independent with Medication Management  Difficulity with ADLs: None  Difficulity with IADLs: None    Functional Assesment  Prior Functional Level: Independent with Activities of Daily Living, Independent with Medication Management    Finances  Financial Barriers to Discharge: No  Prescription Coverage: Yes    Vision / Hearing Impairment  Vision Impairment : Yes  Right Eye Vision: Impaired, Wears Glasses  Left Eye Vision: Impaired, Wears Glasses  Hearing Impairment : No              Domestic  Abuse  Have you ever been the victim of abuse or violence?: No  Physical Abuse or Sexual Abuse: No  Verbal Abuse or Emotional Abuse: No  Possible Abuse Reported to:: Not Applicable    Psychological Assessment  Non-compliant with Treatment: No    Discharge Risks or Barriers  Discharge risks or barriers?: No    Anticipated Discharge Information  Anticipated discharge disposition: Home

## 2019-11-02 VITALS
OXYGEN SATURATION: 96 % | TEMPERATURE: 98.5 F | SYSTOLIC BLOOD PRESSURE: 94 MMHG | HEART RATE: 94 BPM | RESPIRATION RATE: 16 BRPM | DIASTOLIC BLOOD PRESSURE: 48 MMHG | HEIGHT: 63 IN | WEIGHT: 180.78 LBS | BODY MASS INDEX: 32.03 KG/M2

## 2019-11-02 LAB
ALBUMIN SERPL BCP-MCNC: 2.7 G/DL (ref 3.2–4.9)
ALBUMIN/GLOB SERPL: 2.1 G/DL
ALP SERPL-CCNC: 71 U/L (ref 30–99)
ALT SERPL-CCNC: 35 U/L (ref 2–50)
AMMONIA PLAS-SCNC: 52 UMOL/L (ref 11–45)
ANION GAP SERPL CALC-SCNC: 6 MMOL/L (ref 0–11.9)
AST SERPL-CCNC: 13 U/L (ref 12–45)
BASOPHILS # BLD AUTO: 1.2 % (ref 0–1.8)
BASOPHILS # BLD: 0.06 K/UL (ref 0–0.12)
BILIRUB SERPL-MCNC: 0.2 MG/DL (ref 0.1–1.5)
BUN SERPL-MCNC: 9 MG/DL (ref 8–22)
CALCIUM SERPL-MCNC: 5.8 MG/DL (ref 8.5–10.5)
CHLORIDE SERPL-SCNC: 111 MMOL/L (ref 96–112)
CO2 SERPL-SCNC: 23 MMOL/L (ref 20–33)
CREAT SERPL-MCNC: 0.4 MG/DL (ref 0.5–1.4)
EOSINOPHIL # BLD AUTO: 0.11 K/UL (ref 0–0.51)
EOSINOPHIL NFR BLD: 2.2 % (ref 0–6.9)
ERYTHROCYTE [DISTWIDTH] IN BLOOD BY AUTOMATED COUNT: 43.4 FL (ref 35.9–50)
GLOBULIN SER CALC-MCNC: 1.3 G/DL (ref 1.9–3.5)
GLUCOSE SERPL-MCNC: 71 MG/DL (ref 65–99)
HCT VFR BLD AUTO: 37.4 % (ref 37–47)
HGB BLD-MCNC: 12.6 G/DL (ref 12–16)
IMM GRANULOCYTES # BLD AUTO: 0.01 K/UL (ref 0–0.11)
IMM GRANULOCYTES NFR BLD AUTO: 0.2 % (ref 0–0.9)
LYMPHOCYTES # BLD AUTO: 1.89 K/UL (ref 1–4.8)
LYMPHOCYTES NFR BLD: 37.5 % (ref 22–41)
MCH RBC QN AUTO: 31.7 PG (ref 27–33)
MCHC RBC AUTO-ENTMCNC: 33.7 G/DL (ref 33.6–35)
MCV RBC AUTO: 94 FL (ref 81.4–97.8)
MONOCYTES # BLD AUTO: 0.55 K/UL (ref 0–0.85)
MONOCYTES NFR BLD AUTO: 10.9 % (ref 0–13.4)
NEUTROPHILS # BLD AUTO: 2.42 K/UL (ref 2–7.15)
NEUTROPHILS NFR BLD: 48 % (ref 44–72)
NRBC # BLD AUTO: 0 K/UL
NRBC BLD-RTO: 0 /100 WBC
PLATELET # BLD AUTO: 356 K/UL (ref 164–446)
PMV BLD AUTO: 9.3 FL (ref 9–12.9)
POTASSIUM SERPL-SCNC: 3.4 MMOL/L (ref 3.6–5.5)
PROT SERPL-MCNC: 4 G/DL (ref 6–8.2)
RBC # BLD AUTO: 3.98 M/UL (ref 4.2–5.4)
SODIUM SERPL-SCNC: 140 MMOL/L (ref 135–145)
VALPROATE SERPL-MCNC: 26.1 UG/ML (ref 50–100)
WBC # BLD AUTO: 5 K/UL (ref 4.8–10.8)

## 2019-11-02 PROCEDURE — 36415 COLL VENOUS BLD VENIPUNCTURE: CPT

## 2019-11-02 PROCEDURE — 80053 COMPREHEN METABOLIC PANEL: CPT

## 2019-11-02 PROCEDURE — G0378 HOSPITAL OBSERVATION PER HR: HCPCS

## 2019-11-02 PROCEDURE — 700111 HCHG RX REV CODE 636 W/ 250 OVERRIDE (IP): Performed by: HOSPITALIST

## 2019-11-02 PROCEDURE — 700102 HCHG RX REV CODE 250 W/ 637 OVERRIDE(OP): Performed by: HOSPITALIST

## 2019-11-02 PROCEDURE — 85025 COMPLETE CBC W/AUTO DIFF WBC: CPT

## 2019-11-02 PROCEDURE — 700105 HCHG RX REV CODE 258: Performed by: INTERNAL MEDICINE

## 2019-11-02 PROCEDURE — 96375 TX/PRO/DX INJ NEW DRUG ADDON: CPT

## 2019-11-02 PROCEDURE — A9270 NON-COVERED ITEM OR SERVICE: HCPCS | Performed by: HOSPITALIST

## 2019-11-02 PROCEDURE — 96376 TX/PRO/DX INJ SAME DRUG ADON: CPT

## 2019-11-02 PROCEDURE — 82140 ASSAY OF AMMONIA: CPT

## 2019-11-02 PROCEDURE — 700111 HCHG RX REV CODE 636 W/ 250 OVERRIDE (IP): Performed by: INTERNAL MEDICINE

## 2019-11-02 PROCEDURE — 80164 ASSAY DIPROPYLACETIC ACD TOT: CPT

## 2019-11-02 PROCEDURE — 99217 PR OBSERVATION CARE DISCHARGE: CPT | Performed by: HOSPITALIST

## 2019-11-02 RX ORDER — POTASSIUM CHLORIDE 20 MEQ/1
40 TABLET, EXTENDED RELEASE ORAL ONCE
Status: COMPLETED | OUTPATIENT
Start: 2019-11-02 | End: 2019-11-02

## 2019-11-02 RX ORDER — DIVALPROEX SODIUM 500 MG/1
500 TABLET, DELAYED RELEASE ORAL
Qty: 90 TAB | Refills: 3 | Status: SHIPPED
Start: 2019-11-02 | End: 2020-02-22

## 2019-11-02 RX ORDER — DIVALPROEX SODIUM 250 MG/1
250 TABLET, DELAYED RELEASE ORAL EVERY 12 HOURS
Qty: 90 TAB | Refills: 3 | Status: SHIPPED
Start: 2019-11-02 | End: 2020-01-28

## 2019-11-02 RX ADMIN — DIPHENHYDRAMINE HYDROCHLORIDE 50 MG: 50 INJECTION INTRAMUSCULAR; INTRAVENOUS at 02:51

## 2019-11-02 RX ADMIN — DIVALPROEX SODIUM 250 MG: 250 TABLET, DELAYED RELEASE ORAL at 04:53

## 2019-11-02 RX ADMIN — BUSPIRONE HYDROCHLORIDE 5 MG: 10 TABLET ORAL at 04:50

## 2019-11-02 RX ADMIN — DIPHENHYDRAMINE HYDROCHLORIDE 50 MG: 50 INJECTION INTRAMUSCULAR; INTRAVENOUS at 11:51

## 2019-11-02 RX ADMIN — OMEPRAZOLE 20 MG: 20 CAPSULE, DELAYED RELEASE ORAL at 04:50

## 2019-11-02 RX ADMIN — POTASSIUM CHLORIDE 40 MEQ: 20 TABLET, EXTENDED RELEASE ORAL at 09:45

## 2019-11-02 RX ADMIN — HYDROMORPHONE HYDROCHLORIDE 1 MG: 1 INJECTION, SOLUTION INTRAMUSCULAR; INTRAVENOUS; SUBCUTANEOUS at 09:34

## 2019-11-02 RX ADMIN — DULOXETINE HYDROCHLORIDE 60 MG: 60 CAPSULE, DELAYED RELEASE ORAL at 04:50

## 2019-11-02 RX ADMIN — HYDROMORPHONE HYDROCHLORIDE 1 MG: 1 INJECTION, SOLUTION INTRAMUSCULAR; INTRAVENOUS; SUBCUTANEOUS at 04:56

## 2019-11-02 RX ADMIN — VITAMIN C 1 TABLET: TAB at 04:50

## 2019-11-02 RX ADMIN — KETOROLAC TROMETHAMINE 30 MG: 30 INJECTION, SOLUTION INTRAMUSCULAR at 11:51

## 2019-11-02 RX ADMIN — RISPERIDONE 2 MG: 0.5 TABLET ORAL at 04:51

## 2019-11-02 RX ADMIN — LACTULOSE 30 ML: 20 SOLUTION ORAL at 04:50

## 2019-11-02 RX ADMIN — ASPIRIN 81 MG 81 MG: 81 TABLET ORAL at 04:50

## 2019-11-02 RX ADMIN — GABAPENTIN 600 MG: 300 CAPSULE ORAL at 11:51

## 2019-11-02 RX ADMIN — GABAPENTIN 600 MG: 300 CAPSULE ORAL at 04:50

## 2019-11-02 RX ADMIN — OXYCODONE HYDROCHLORIDE 5 MG: 5 TABLET ORAL at 07:17

## 2019-11-02 RX ADMIN — METOCLOPRAMIDE 10 MG: 5 INJECTION, SOLUTION INTRAMUSCULAR; INTRAVENOUS at 11:51

## 2019-11-02 RX ADMIN — MULTIPLE VITAMINS W/ MINERALS TAB 1 TABLET: TAB at 04:51

## 2019-11-02 RX ADMIN — LEVOCARNITINE 330 MG: 330 TABLET ORAL at 04:53

## 2019-11-02 RX ADMIN — HEPARIN 500 UNITS: 100 SYRINGE at 12:59

## 2019-11-02 RX ADMIN — LEVETIRACETAM 1500 MG: 500 TABLET ORAL at 04:51

## 2019-11-02 RX ADMIN — KETOROLAC TROMETHAMINE 30 MG: 30 INJECTION, SOLUTION INTRAMUSCULAR at 02:51

## 2019-11-02 RX ADMIN — METOCLOPRAMIDE 10 MG: 5 INJECTION, SOLUTION INTRAMUSCULAR; INTRAVENOUS at 04:56

## 2019-11-02 RX ADMIN — CALCIUM GLUCONATE 2 G: 98 INJECTION, SOLUTION INTRAVENOUS at 06:07

## 2019-11-02 RX ADMIN — LEVETIRACETAM 1500 MG: 500 TABLET ORAL at 11:51

## 2019-11-02 NOTE — CARE PLAN
Problem: Communication  Goal: The ability to communicate needs accurately and effectively will improve  Outcome: PROGRESSING AS EXPECTED    Pt. alert and oriented x 4 and able to call appropriately for needs and concerns.    Problem: Safety  Goal: Will remain free from injury  Outcome: PROGRESSING AS EXPECTED     Pt. up via SBA with steady gait and tolerating well at this time.

## 2019-11-02 NOTE — DISCHARGE SUMMARY
Discharge Summary    CHIEF COMPLAINT ON ADMISSION  Chief Complaint   Patient presents with   • Tremors   • Nausea       Reason for Admission  Tremors, Nausea, Shoulder Pain     Admission Date  10/29/2019    CODE STATUS  Full Code    HPI & HOSPITAL COURSE  This is a 49 y.o. female here with with history of pseudotumor cerebri, status post  shunt, history of seizure disorders, chronic headaches comes at the emergency room with complaints of tremors.  Tremors are no nonintentional and happens in all 4 extremities.   Upon arrival to the emergency room her blood pressure was 152/97, pulse 114.  Sodium was 127, calcium 7.8, anion gap 10, TSH 2.9, valproic acid 95, magnesium 1.4.  EEG found toxic encephalopathy She was found to have elevated ammonia levels and started on lactulose.  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  She was prescribed oral carnitine.  Her valproic acid levels were adjusted which she would have to 250 mg in the morning and evening time.  At bedtime she would have 500 mg.  During her hospital stay she was complaining of severe migraine headaches which resolved with medications, IV magnesium, IV fluids.  In the past the patient's had a history of narcotic abuse which should be avoided.  The patient was asked to follow-up with a pain management doctor who prescribes her Depakote.    Therefore, she is discharged in good and stable condition to home with close outpatient follow-up.    The patient met 2-midnight criteria for an inpatient stay at the time of discharge.     Discharge Date  11/2/2019    FOLLOW UP ITEMS POST DISCHARGE  Follow-up with pain management     DISCHARGE DIAGNOSES  Principal Problem:    Valproic acid toxicity POA: Unknown  Active Problems:    Acute encephalopathy POA: Unknown    Hyponatremia POA: Yes    Tremor POA: Yes    Intractable migraine with aura with status migrainosus POA: Unknown    Seizures (HCC) POA: Yes    history of pseudotumor cerebri (Chronic) POA: Yes     Transaminitis POA: Unknown    Anxiety POA: Yes  Resolved Problems:    * No resolved hospital problems. *      FOLLOW UP  No future appointments.  No follow-up provider specified.    MEDICATIONS ON DISCHARGE     Medication List      CHANGE how you take these medications      Instructions   * divalproex 250 MG Tbec  What changed:    · medication strength  · how much to take  · when to take this  Commonly known as:  DEPAKOTE   Take 1 Tab by mouth every 12 hours.  Dose:  250 mg     * divalproex 500 MG Tbec  What changed:  You were already taking a medication with the same name, and this prescription was added. Make sure you understand how and when to take each.  Commonly known as:  DEPAKOTE   Take 1 Tab by mouth every bedtime.  Dose:  500 mg         * This list has 2 medication(s) that are the same as other medications prescribed for you. Read the directions carefully, and ask your doctor or other care provider to review them with you.            CONTINUE taking these medications      Instructions   aspirin 81 MG Chew chewable tablet  Commonly known as:  ASA   Take 81 mg by mouth every morning.  Dose:  81 mg     busPIRone 5 MG tablet  Commonly known as:  BUSPAR   Take 5 mg by mouth 2 times a day.  Dose:  5 mg     diphenhydrAMINE 50 MG/ML Soln  Commonly known as:  BENADRYL   50 mg by Intramuscular route every 6 hours as needed (Migraines). Indications: migraine  Dose:  50 mg     DULoxetine 60 MG Cpep delayed-release capsule  Commonly known as:  CYMBALTA   Take 60 mg by mouth 2 times a day.  Dose:  60 mg     EMGALITY 120 MG/ML Soaj  Generic drug:  Galcanezumab-gnlm   Inject 120 mg as instructed Q30 DAYS.  Dose:  120 mg     gabapentin 600 MG tablet  Commonly known as:  NEURONTIN   Take 600 mg by mouth 3 times a day.  Dose:  600 mg     levETIRAcetam 500 MG Tabs  Commonly known as:  KEPPRA   Take 1,500 mg by mouth 3 times a day.  Dose:  1,500 mg     MULTIVITAMIN ADULT Tabs   Take 1 Tab by mouth every day.  Dose:  1 Tab    "  omeprazole 20 MG delayed-release capsule  Commonly known as:  PRILOSEC   Take 20 mg by mouth every day.  Dose:  20 mg     risperiDONE 2 MG Tabs  Commonly known as:  RISPERDAL   Take 2 mg by mouth 2 times a day.  Dose:  2 mg     Vitamin B Complex Tabs   Take 1 Tab by mouth every day.  Dose:  1 Tab            Allergies  Allergies   Allergen Reactions   • Sumatriptan Anaphylaxis     Historical=\"Heart stops.\" Reaction  between 1995 to 1997   • Prochlorperazine Swelling     Tongue swelling. Reaction as a teen. (compazine)  Tolerated Phenergan on 02/24/15   • Vancomycin Shortness of Breath and Rash     Reaction in 2005.  D/W patient 8/31/15 - tolerated loading dose of vancomycin administered on 8/30/15 with some itching to chest with decreased infusion rate. Red Man Syndrome.  2018-tolerated slow drip with benadryl pre-med-had no problems.       DIET  Orders Placed This Encounter   Procedures   • Diet Order Regular     Standing Status:   Standing     Number of Occurrences:   1     Order Specific Question:   Diet:     Answer:   Regular [1]       ACTIVITY  As tolerated.  Weight bearing as tolerated    CONSULTATIONS  None    PROCEDURES  None    LABORATORY  Lab Results   Component Value Date    SODIUM 140 11/02/2019    POTASSIUM 3.4 (L) 11/02/2019    CHLORIDE 111 11/02/2019    CO2 23 11/02/2019    GLUCOSE 71 11/02/2019    BUN 9 11/02/2019    CREATININE 0.40 (L) 11/02/2019        Lab Results   Component Value Date    WBC 5.0 11/02/2019    HEMOGLOBIN 12.6 11/02/2019    HEMATOCRIT 37.4 11/02/2019    PLATELETCT 356 11/02/2019        Total time of the discharge process exceeds 40 minutes.  "

## 2019-11-02 NOTE — PROGRESS NOTES
Received call from lab with critical result of Calcium at 5.8. Critical lab result read back to . Dr. Salomon notified of critical lab result at 7818 .  Critical lab result read back by Dr. Salomon. Orders received for one time IVPB calcium gluconate 2 g in NS.

## 2019-11-02 NOTE — CARE PLAN
Problem: Infection  Goal: Will remain free from infection  Outcome: PROGRESSING AS EXPECTED  Intervention: Implement standard precautions and perform hand washing before and after patient contact  Note:   Standard precautions implemented.      Problem: Pain Management  Goal: Pain level will decrease to patient's comfort goal  Outcome: PROGRESSING SLOWER THAN EXPECTED  Intervention: Educate and implement non-pharmacologic comfort measures. Examples: relaxation, distration, play therapy, activity therapy, massage, etc.  Flowsheets (Taken 11/2/2019 0726)  Intervention: Medication (see MAR);Rest

## 2019-11-27 ENCOUNTER — APPOINTMENT (OUTPATIENT)
Dept: RADIOLOGY | Facility: MEDICAL CENTER | Age: 49
End: 2019-11-27
Attending: EMERGENCY MEDICINE
Payer: MEDICARE

## 2019-11-27 ENCOUNTER — HOSPITAL ENCOUNTER (EMERGENCY)
Facility: MEDICAL CENTER | Age: 49
End: 2019-11-27
Attending: EMERGENCY MEDICINE
Payer: MEDICARE

## 2019-11-27 VITALS
SYSTOLIC BLOOD PRESSURE: 95 MMHG | RESPIRATION RATE: 1 BRPM | WEIGHT: 166.89 LBS | HEIGHT: 63 IN | BODY MASS INDEX: 29.57 KG/M2 | OXYGEN SATURATION: 95 % | HEART RATE: 86 BPM | DIASTOLIC BLOOD PRESSURE: 59 MMHG | TEMPERATURE: 97.2 F

## 2019-11-27 DIAGNOSIS — G89.4 CHRONIC PAIN SYNDROME: ICD-10-CM

## 2019-11-27 DIAGNOSIS — R51.9 INTRACTABLE HEADACHE, UNSPECIFIED CHRONICITY PATTERN, UNSPECIFIED HEADACHE TYPE: ICD-10-CM

## 2019-11-27 PROCEDURE — 96375 TX/PRO/DX INJ NEW DRUG ADDON: CPT

## 2019-11-27 PROCEDURE — 70450 CT HEAD/BRAIN W/O DYE: CPT

## 2019-11-27 PROCEDURE — 99284 EMERGENCY DEPT VISIT MOD MDM: CPT

## 2019-11-27 PROCEDURE — 700101 HCHG RX REV CODE 250: Performed by: EMERGENCY MEDICINE

## 2019-11-27 PROCEDURE — 700111 HCHG RX REV CODE 636 W/ 250 OVERRIDE (IP): Performed by: EMERGENCY MEDICINE

## 2019-11-27 PROCEDURE — 700105 HCHG RX REV CODE 258: Performed by: EMERGENCY MEDICINE

## 2019-11-27 PROCEDURE — 96374 THER/PROPH/DIAG INJ IV PUSH: CPT

## 2019-11-27 RX ORDER — METOCLOPRAMIDE HYDROCHLORIDE 5 MG/ML
10 INJECTION INTRAMUSCULAR; INTRAVENOUS ONCE
Status: COMPLETED | OUTPATIENT
Start: 2019-11-27 | End: 2019-11-27

## 2019-11-27 RX ORDER — DIPHENHYDRAMINE HYDROCHLORIDE 50 MG/ML
25 INJECTION INTRAMUSCULAR; INTRAVENOUS ONCE
Status: COMPLETED | OUTPATIENT
Start: 2019-11-27 | End: 2019-11-27

## 2019-11-27 RX ORDER — SODIUM CHLORIDE 9 MG/ML
1000 INJECTION, SOLUTION INTRAVENOUS ONCE
Status: COMPLETED | OUTPATIENT
Start: 2019-11-27 | End: 2019-11-27

## 2019-11-27 RX ORDER — HYDROMORPHONE HYDROCHLORIDE 1 MG/ML
1 INJECTION, SOLUTION INTRAMUSCULAR; INTRAVENOUS; SUBCUTANEOUS ONCE
Status: COMPLETED | OUTPATIENT
Start: 2019-11-27 | End: 2019-11-27

## 2019-11-27 RX ORDER — HYDROMORPHONE HYDROCHLORIDE 1 MG/ML
1 INJECTION, SOLUTION INTRAMUSCULAR; INTRAVENOUS; SUBCUTANEOUS ONCE
Status: DISCONTINUED | OUTPATIENT
Start: 2019-11-27 | End: 2019-11-27 | Stop reason: HOSPADM

## 2019-11-27 RX ADMIN — SODIUM CHLORIDE 1000 ML: 9 INJECTION, SOLUTION INTRAVENOUS at 08:27

## 2019-11-27 RX ADMIN — HYDROMORPHONE HYDROCHLORIDE 1 MG: 1 INJECTION, SOLUTION INTRAMUSCULAR; INTRAVENOUS; SUBCUTANEOUS at 08:30

## 2019-11-27 RX ADMIN — METOCLOPRAMIDE 10 MG: 5 INJECTION, SOLUTION INTRAMUSCULAR; INTRAVENOUS at 08:30

## 2019-11-27 RX ADMIN — SODIUM CHLORIDE 1000 ML: 9 INJECTION, SOLUTION INTRAVENOUS at 10:14

## 2019-11-27 RX ADMIN — KETAMINE HYDROCHLORIDE 25 MG: 10 INJECTION, SOLUTION INTRAMUSCULAR; INTRAVENOUS at 11:46

## 2019-11-27 RX ADMIN — HEPARIN 500 UNITS: 100 SYRINGE at 13:12

## 2019-11-27 RX ADMIN — DIPHENHYDRAMINE HYDROCHLORIDE: 50 INJECTION INTRAMUSCULAR; INTRAVENOUS at 08:30

## 2019-11-27 NOTE — ED NOTES
Patient states headache for last 8 days, states she received Toradol and benadryl injection last night at home without relief. States pain is currently an 8 on 0 to 10 scale.

## 2019-11-27 NOTE — ED NOTES
Pt ambulates to triage with c/c headache x8 days, reports history of migraines.  A&ox4.  Pt to lobby & advised to inform RN of any changes.

## 2019-11-27 NOTE — ED PROVIDER NOTES
ED Provider Note    Scribed for Tung Maurer M.D. by Mariam Escalona. 11/27/2019  7:44 AM    Primary care provider: Elliott Power M.D.  Means of arrival: Walk in  History obtained from: Patient  History limited by: None    CHIEF COMPLAINT  •  Headache    HPI  Enedelia Pang is a 49 y.o. female who presents to the Emergency Department for a headache with an onset 8 days. The patient has a complex history including pseudotumor cerebri status post  shunt, seizure disorders and chronic headaches. She is followed by Dr. Ribera, Neurology, and pain management who prescribes her Depakote. She developed an acute headache eight days ago which is described as constant discomfort. No relief after taking intramuscular Toradol and Benadryl yesterday evening. Her current is similar to her typical migraine headaches but is more severe and is rated at an 8/10 in severity this morning. She endorses photophobia, poor oral intake and reportedly feels dehydrated. Negative for gait instability, weakness, vision changes, focal weakness or numbness.      REVIEW OF SYSTEMS  Pertinent positives include headache, photophobia, poor oral intake and dehydration.   Pertinent negatives include no gait instability, weakness, vision changes, focal weakness or numbness.    All other systems reviewed and negative. See HPI for further details.       PAST MEDICAL HISTORY  Patient has a past medical history of Allergy, unspecified not elsewhere classified, Anemia (10/05/2017), Anesthesia, Anxiety, Anxiety, generalized, autoimmune, Autoimmune disorder ([facility]), Clostridium difficile diarrhea (2014), Depression, Elevated liver enzymes (2017), H/O Clostridium difficile infection (2014), MRSA infection (2003), Hydrocephalus (HCC) (2015), Joint pain (09/10/2019), Migraine with aura, with intractable migraine, so stated, without mention of status migrainosus, MRSA (methicillin resistant Staphylococcus aureus) (08/2015), MRSA (methicillin resistant  Staphylococcus aureus) (12/2017), MRSA (methicillin resistant Staphylococcus aureus) (2018), Pituitary abnormality (HCC) (2002), Requires supplemental oxygen, Seizure ([facility]) (started 2017), Staph infection, and Stroke ([facility]) (2007).      SURGICAL HISTORY  Patient has a past surgical history that includes tonsillectomy (1985); other neurological surg (2824-2980); irrigation & debridement neuro (N/A, 6/28/2015); lumbar exploration (N/A, 8/31/2015); spinal cord stimulator (12/19/2016);  shunt insertion (5/31/2017); liver biopsy (07/24/2017); cholecystectomy (2001); appendectomy (1982); spinal cord stimulator (05/24/2018); spinal cord stimulator (2003); knee arthroscopy (Right, 1990); full thickness skin graft (Left, 04/2018); other surgical procedure (11/10/2017); other neurological surg (08/2015); endometrial ablation (2001); tubal coagulation laparoscopic bilateral (Bilateral, 2001); and implant neurostim/ (9/13/2019).      SOCIAL HISTORY  Social History     Tobacco Use   • Smoking status: Never Smoker   • Smokeless tobacco: Never Used   Substance Use Topics   • Alcohol use: Not Currently     Frequency: Never     Binge frequency: Never   • Drug use: Never      Social History     Substance and Sexual Activity   Drug Use Never       FAMILY HISTORY  Family History   Problem Relation Age of Onset   • No Known Problems Mother    • No Known Problems Father        CURRENT MEDICATIONS  Home Medications     Reviewed by Maricarmen Dior R.N. (Registered Nurse) on 11/27/19 at 0748  Med List Status: Not Addressed   Medication Last Dose Status   aspirin (ASA) 81 MG Chew Tab chewable tablet  Active   B Complex Vitamins (VITAMIN B COMPLEX) Tab  Active   busPIRone (BUSPAR) 5 MG tablet  Active   diphenhydrAMINE (BENADRYL) 50 MG/ML Solution  Active   divalproex (DEPAKOTE) 250 MG Tablet Delayed Response  Active   divalproex (DEPAKOTE) 500 MG Tablet Delayed Response  Active   duloxetine (CYMBALTA) 60 MG CPEP delayed-release  "capsule  Active   EMGALITY 120 MG/ML Solution Auto-injector  Active   gabapentin (NEURONTIN) 600 MG tablet  Active   levETIRAcetam (KEPPRA) 500 MG Tab  Active   Multiple Vitamins-Minerals (MULTIVITAMIN ADULT) Tab  Active   omeprazole (PRILOSEC) 20 MG delayed-release capsule  Active   risperiDONE (RISPERDAL) 2 MG Tab  Active                ALLERGIES  Allergies   Allergen Reactions   • Sumatriptan Anaphylaxis     Historical=\"Heart stops.\" Reaction  between 1995 to 1997   • Prochlorperazine Swelling     Tongue swelling. Reaction as a teen. (compazine)  Tolerated Phenergan on 02/24/15   • Vancomycin Shortness of Breath and Rash     Reaction in 2005.  D/W patient 8/31/15 - tolerated loading dose of vancomycin administered on 8/30/15 with some itching to chest with decreased infusion rate. Red Man Syndrome.  2018-tolerated slow drip with benadryl pre-med-had no problems.       PHYSICAL EXAM  VITAL SIGNS: /73   Pulse (!) 115   Temp 36.2 °C (97.2 °F) (Temporal)   Resp 18   Ht 1.6 m (5' 3\")   Wt 75.7 kg (166 lb 14.2 oz)   SpO2 98%   BMI 29.56 kg/m²     Nursing note and vitals reviewed.    Constitutional: Well-developed and well-nourished. No distress.   HENT: Head is normocephalic and atraumatic. Oropharynx is clear and dry without exudate or erythema.   Eyes: Pupils are equal, round, and reactive to light. Conjunctiva are normal.   Cardiovascular: Normal rate and regular rhythm. No murmur heard. Normal radial pulses.  Pulmonary/Chest: Breath sounds normal. No wheezes or rales.   Abdominal: Soft and non-tender. No distention    Musculoskeletal: Extremities exhibit normal range of motion without edema or tenderness.   Neurological: Awake, alert and oriented to person, place, and time. No focal deficits noted. Alert & oriented x 3, Normal cognition, Cranial nerves II-XII are intact, normal speech and language, strength to bilateral upper and lower extremities is normal, sensation is intact throughout. Slightly " "delayed responses with questioning but answers all questions appropriately.  Skin: Skin is warm and dry. No rash.   Psychiatric: Normal mood and affect. Appropriate for clinical situation.      DIAGNOSTIC STUDIES / PROCEDURES    RADIOLOGY  CT-HEAD W/O   Final Result      1. Stable right frontal approach  shunt catheter.   2. No acute intracranial abnormality.        The radiologist's interpretation of all radiological studies have been reviewed by me.      COURSE & MEDICAL DECISION MAKING  Pertinent Labs & Imaging studies reviewed. (See chart for details)    Differential Diagnoses include but are not limited to: migraine headache or shunt malfunction.    7:44 AM Obtained and reviewed patient's electronic medical records which indicate a history of pseudotumor cerebri, seizure disorders and chronic headaches. Last admitted for valproic acid toxicity and acute encephalopathy, discharged on 11/02/19.    7:51 AM Patient seen and examined at bedside for a headache.      Initial orders in the Emergency Department included CT head.  Initial treatment in the Emergency Department included Dilaudid 1 mg IV, Reglan 10 mg IV and Benadryl 25 mg IV. She will be resuscitated with intravenous fluids.    10:03 AM No improvement of the headache with the administered medications. Plan to treat with a second liter of intravenous fluids and Dilaudid 1 mg IV.     11:22 AM Plan to treat the patient with Ketamine 25 mg IV for persistent symptoms.    Discharge plan was discussed with the patient and includes following up with Dr. Power.  Return to the ED for new or persisting symptoms including worsened headache, neurological deficits or any additional concerns.  The patient verbalizes understanding and will comply.  Patient is stable at the time of discharge.  Vital signs were reviewed: /73   Pulse 91   Temp 36.2 °C (97.2 °F) (Temporal)   Resp (!) 1   Ht 1.6 m (5' 3\")   Wt 75.7 kg (166 lb 14.2 oz)   SpO2 95%  "       HYDRATION: Based on the patient's presentation of Dehydration and Other dry mucous membranes and poor oral intake for the past eight days the patient was given IV fluids. IV Hydration was used because oral hydration was not adequate alone. Upon recheck following hydration, the patient was better hydrated, stable vital signs, moist mucous membranes.  HTN/IDDM FOLLOW UP:  The patient is referred to a primary physician for blood pressure management, diabetic screening, and for all other preventive health concerns      DISPOSITION  Patient will be discharged home in stable condition.      FOLLOW UP  [facility], Emergency Dept  [address] [address] [phone]    If symptoms worsen    Elliott Power M.D.  [address]  [facility] [address]  [phone]    Schedule an appointment as soon as possible for a visit       DIAGNOSIS  1. Intractable headache, unspecified chronicity pattern, unspecified headache type    2. Chronic pain syndrome           Mariam MADERA (Scribe), am scribing for, and in the presence of, Tung Maurer M.D.    Electronically signed by: Mariam Escalona (Scribe), 11/27/2019    Tung MADERA M.D. personally performed the services described in this documentation, as scribed by Mariam Escalona in my presence, and it is both accurate and complete. C.     The note accurately reflects work and decisions made by me.  Tung Maurer  11/27/2019  2:40 PM

## 2019-12-11 ENCOUNTER — HOSPITAL ENCOUNTER (OUTPATIENT)
Facility: MEDICAL CENTER | Age: 49
End: 2019-12-14
Attending: EMERGENCY MEDICINE | Admitting: INTERNAL MEDICINE
Payer: MEDICARE

## 2019-12-11 DIAGNOSIS — G43.911 INTRACTABLE MIGRAINE WITH STATUS MIGRAINOSUS, UNSPECIFIED MIGRAINE TYPE: ICD-10-CM

## 2019-12-11 DIAGNOSIS — G93.40 ACUTE ENCEPHALOPATHY: ICD-10-CM

## 2019-12-11 PROCEDURE — 99285 EMERGENCY DEPT VISIT HI MDM: CPT

## 2019-12-11 RX ORDER — LORAZEPAM 2 MG/ML
0.5 INJECTION INTRAMUSCULAR ONCE
Status: COMPLETED | OUTPATIENT
Start: 2019-12-12 | End: 2019-12-12

## 2019-12-12 ENCOUNTER — APPOINTMENT (OUTPATIENT)
Dept: RADIOLOGY | Facility: MEDICAL CENTER | Age: 49
End: 2019-12-12
Attending: EMERGENCY MEDICINE
Payer: MEDICARE

## 2019-12-12 PROBLEM — R25.8 BRADYKINESIA: Status: ACTIVE | Noted: 2019-12-12

## 2019-12-12 PROBLEM — R41.82 ALTERED MENTAL STATUS: Status: ACTIVE | Noted: 2019-12-12

## 2019-12-12 PROBLEM — K21.9 GERD (GASTROESOPHAGEAL REFLUX DISEASE): Status: ACTIVE | Noted: 2019-12-12

## 2019-12-12 PROBLEM — G43.909 MIGRAINE: Status: ACTIVE | Noted: 2019-12-12

## 2019-12-12 LAB
ALBUMIN SERPL BCP-MCNC: 4 G/DL (ref 3.2–4.9)
ALBUMIN/GLOB SERPL: 1.3 G/DL
ALP SERPL-CCNC: 275 U/L (ref 30–99)
ALT SERPL-CCNC: 79 U/L (ref 2–50)
AMMONIA PLAS-SCNC: 43 UMOL/L (ref 11–45)
AMPHET UR QL SCN: NEGATIVE
ANION GAP SERPL CALC-SCNC: 14 MMOL/L (ref 0–11.9)
APPEARANCE UR: CLEAR
AST SERPL-CCNC: 38 U/L (ref 12–45)
BARBITURATES UR QL SCN: NEGATIVE
BASOPHILS # BLD AUTO: 0.6 % (ref 0–1.8)
BASOPHILS # BLD: 0.04 K/UL (ref 0–0.12)
BENZODIAZ UR QL SCN: NEGATIVE
BILIRUB SERPL-MCNC: 0.5 MG/DL (ref 0.1–1.5)
BILIRUB UR QL STRIP.AUTO: NEGATIVE
BUN SERPL-MCNC: 3 MG/DL (ref 8–22)
BZE UR QL SCN: NEGATIVE
CALCIUM SERPL-MCNC: 8.8 MG/DL (ref 8.5–10.5)
CANNABINOIDS UR QL SCN: NEGATIVE
CHLORIDE SERPL-SCNC: 103 MMOL/L (ref 96–112)
CO2 SERPL-SCNC: 22 MMOL/L (ref 20–33)
COLOR UR: YELLOW
CREAT SERPL-MCNC: 0.58 MG/DL (ref 0.5–1.4)
EKG IMPRESSION: NORMAL
EOSINOPHIL # BLD AUTO: 0.01 K/UL (ref 0–0.51)
EOSINOPHIL NFR BLD: 0.2 % (ref 0–6.9)
ERYTHROCYTE [DISTWIDTH] IN BLOOD BY AUTOMATED COUNT: 46.6 FL (ref 35.9–50)
GLOBULIN SER CALC-MCNC: 3 G/DL (ref 1.9–3.5)
GLUCOSE SERPL-MCNC: 91 MG/DL (ref 65–99)
GLUCOSE UR STRIP.AUTO-MCNC: NEGATIVE MG/DL
HCT VFR BLD AUTO: 41.7 % (ref 37–47)
HGB BLD-MCNC: 13.1 G/DL (ref 12–16)
IMM GRANULOCYTES # BLD AUTO: 0.02 K/UL (ref 0–0.11)
IMM GRANULOCYTES NFR BLD AUTO: 0.3 % (ref 0–0.9)
KETONES UR STRIP.AUTO-MCNC: NEGATIVE MG/DL
LEUKOCYTE ESTERASE UR QL STRIP.AUTO: NEGATIVE
LYMPHOCYTES # BLD AUTO: 1.83 K/UL (ref 1–4.8)
LYMPHOCYTES NFR BLD: 29.6 % (ref 22–41)
MAGNESIUM SERPL-MCNC: 1.7 MG/DL (ref 1.5–2.5)
MCH RBC QN AUTO: 29.7 PG (ref 27–33)
MCHC RBC AUTO-ENTMCNC: 31.4 G/DL (ref 33.6–35)
MCV RBC AUTO: 94.6 FL (ref 81.4–97.8)
METHADONE UR QL SCN: NEGATIVE
MICRO URNS: NORMAL
MONOCYTES # BLD AUTO: 0.56 K/UL (ref 0–0.85)
MONOCYTES NFR BLD AUTO: 9.1 % (ref 0–13.4)
NEUTROPHILS # BLD AUTO: 3.72 K/UL (ref 2–7.15)
NEUTROPHILS NFR BLD: 60.2 % (ref 44–72)
NITRITE UR QL STRIP.AUTO: NEGATIVE
NRBC # BLD AUTO: 0 K/UL
NRBC BLD-RTO: 0 /100 WBC
OPIATES UR QL SCN: NEGATIVE
OXYCODONE UR QL SCN: NEGATIVE
PCP UR QL SCN: NEGATIVE
PH UR STRIP.AUTO: 5.5 [PH] (ref 5–8)
PLATELET # BLD AUTO: 325 K/UL (ref 164–446)
PMV BLD AUTO: 9.9 FL (ref 9–12.9)
POTASSIUM SERPL-SCNC: 3.7 MMOL/L (ref 3.6–5.5)
PROCALCITONIN SERPL-MCNC: <0.05 NG/ML
PROPOXYPH UR QL SCN: NEGATIVE
PROT SERPL-MCNC: 7 G/DL (ref 6–8.2)
PROT UR QL STRIP: NEGATIVE MG/DL
RBC # BLD AUTO: 4.41 M/UL (ref 4.2–5.4)
RBC UR QL AUTO: NEGATIVE
SODIUM SERPL-SCNC: 139 MMOL/L (ref 135–145)
SP GR UR STRIP.AUTO: 1.01
TROPONIN T SERPL-MCNC: 11 NG/L (ref 6–19)
UROBILINOGEN UR STRIP.AUTO-MCNC: 0.2 MG/DL
VALPROATE SERPL-MCNC: 19.5 UG/ML (ref 50–100)
WBC # BLD AUTO: 6.2 K/UL (ref 4.8–10.8)

## 2019-12-12 PROCEDURE — 96366 THER/PROPH/DIAG IV INF ADDON: CPT

## 2019-12-12 PROCEDURE — 700111 HCHG RX REV CODE 636 W/ 250 OVERRIDE (IP): Performed by: NURSE PRACTITIONER

## 2019-12-12 PROCEDURE — 96376 TX/PRO/DX INJ SAME DRUG ADON: CPT

## 2019-12-12 PROCEDURE — 99215 OFFICE O/P EST HI 40 MIN: CPT | Performed by: PSYCHIATRY & NEUROLOGY

## 2019-12-12 PROCEDURE — 96375 TX/PRO/DX INJ NEW DRUG ADDON: CPT

## 2019-12-12 PROCEDURE — 96367 TX/PROPH/DG ADDL SEQ IV INF: CPT

## 2019-12-12 PROCEDURE — 99220 PR INITIAL OBSERVATION CARE,LEVL III: CPT | Performed by: INTERNAL MEDICINE

## 2019-12-12 PROCEDURE — 84484 ASSAY OF TROPONIN QUANT: CPT

## 2019-12-12 PROCEDURE — 700111 HCHG RX REV CODE 636 W/ 250 OVERRIDE (IP): Performed by: EMERGENCY MEDICINE

## 2019-12-12 PROCEDURE — 700111 HCHG RX REV CODE 636 W/ 250 OVERRIDE (IP): Performed by: INTERNAL MEDICINE

## 2019-12-12 PROCEDURE — 700105 HCHG RX REV CODE 258: Performed by: INTERNAL MEDICINE

## 2019-12-12 PROCEDURE — 36415 COLL VENOUS BLD VENIPUNCTURE: CPT

## 2019-12-12 PROCEDURE — 700102 HCHG RX REV CODE 250 W/ 637 OVERRIDE(OP): Performed by: INTERNAL MEDICINE

## 2019-12-12 PROCEDURE — 96365 THER/PROPH/DIAG IV INF INIT: CPT

## 2019-12-12 PROCEDURE — 70450 CT HEAD/BRAIN W/O DYE: CPT

## 2019-12-12 PROCEDURE — 81003 URINALYSIS AUTO W/O SCOPE: CPT

## 2019-12-12 PROCEDURE — 84145 PROCALCITONIN (PCT): CPT

## 2019-12-12 PROCEDURE — 96372 THER/PROPH/DIAG INJ SC/IM: CPT | Mod: XU

## 2019-12-12 PROCEDURE — 93005 ELECTROCARDIOGRAM TRACING: CPT | Performed by: INTERNAL MEDICINE

## 2019-12-12 PROCEDURE — A9270 NON-COVERED ITEM OR SERVICE: HCPCS | Performed by: INTERNAL MEDICINE

## 2019-12-12 PROCEDURE — 85025 COMPLETE CBC W/AUTO DIFF WBC: CPT

## 2019-12-12 PROCEDURE — G0378 HOSPITAL OBSERVATION PER HR: HCPCS

## 2019-12-12 PROCEDURE — 80164 ASSAY DIPROPYLACETIC ACD TOT: CPT

## 2019-12-12 PROCEDURE — 93005 ELECTROCARDIOGRAM TRACING: CPT | Performed by: HOSPITALIST

## 2019-12-12 PROCEDURE — 80053 COMPREHEN METABOLIC PANEL: CPT

## 2019-12-12 PROCEDURE — 80307 DRUG TEST PRSMV CHEM ANLYZR: CPT

## 2019-12-12 PROCEDURE — 82140 ASSAY OF AMMONIA: CPT

## 2019-12-12 PROCEDURE — 96372 THER/PROPH/DIAG INJ SC/IM: CPT

## 2019-12-12 PROCEDURE — 83735 ASSAY OF MAGNESIUM: CPT

## 2019-12-12 PROCEDURE — 700101 HCHG RX REV CODE 250: Performed by: EMERGENCY MEDICINE

## 2019-12-12 RX ORDER — ONDANSETRON 2 MG/ML
4 INJECTION INTRAMUSCULAR; INTRAVENOUS EVERY 4 HOURS PRN
Status: DISCONTINUED | OUTPATIENT
Start: 2019-12-12 | End: 2019-12-14 | Stop reason: HOSPADM

## 2019-12-12 RX ORDER — OXYCODONE HYDROCHLORIDE 10 MG/1
10 TABLET ORAL
Status: DISCONTINUED | OUTPATIENT
Start: 2019-12-12 | End: 2019-12-14 | Stop reason: HOSPADM

## 2019-12-12 RX ORDER — ASPIRIN 81 MG/1
324 TABLET, CHEWABLE ORAL DAILY
Status: DISCONTINUED | OUTPATIENT
Start: 2019-12-12 | End: 2019-12-14 | Stop reason: HOSPADM

## 2019-12-12 RX ORDER — ASPIRIN 300 MG/1
300 SUPPOSITORY RECTAL DAILY
Status: DISCONTINUED | OUTPATIENT
Start: 2019-12-12 | End: 2019-12-14 | Stop reason: HOSPADM

## 2019-12-12 RX ORDER — MORPHINE SULFATE 4 MG/ML
4 INJECTION, SOLUTION INTRAMUSCULAR; INTRAVENOUS
Status: DISCONTINUED | OUTPATIENT
Start: 2019-12-12 | End: 2019-12-12

## 2019-12-12 RX ORDER — ONDANSETRON 4 MG/1
4 TABLET, ORALLY DISINTEGRATING ORAL EVERY 4 HOURS PRN
Status: DISCONTINUED | OUTPATIENT
Start: 2019-12-12 | End: 2019-12-14 | Stop reason: HOSPADM

## 2019-12-12 RX ORDER — SODIUM CHLORIDE, SODIUM LACTATE, POTASSIUM CHLORIDE, CALCIUM CHLORIDE 600; 310; 30; 20 MG/100ML; MG/100ML; MG/100ML; MG/100ML
INJECTION, SOLUTION INTRAVENOUS CONTINUOUS
Status: DISCONTINUED | OUTPATIENT
Start: 2019-12-12 | End: 2019-12-14 | Stop reason: HOSPADM

## 2019-12-12 RX ORDER — DULOXETIN HYDROCHLORIDE 60 MG/1
60 CAPSULE, DELAYED RELEASE ORAL 2 TIMES DAILY
Status: DISCONTINUED | OUTPATIENT
Start: 2019-12-12 | End: 2019-12-14 | Stop reason: HOSPADM

## 2019-12-12 RX ORDER — KETOROLAC TROMETHAMINE 30 MG/ML
15 INJECTION, SOLUTION INTRAMUSCULAR; INTRAVENOUS EVERY 6 HOURS PRN
Status: DISCONTINUED | OUTPATIENT
Start: 2019-12-12 | End: 2019-12-14 | Stop reason: HOSPADM

## 2019-12-12 RX ORDER — OMEPRAZOLE 20 MG/1
20 CAPSULE, DELAYED RELEASE ORAL DAILY
Status: DISCONTINUED | OUTPATIENT
Start: 2019-12-12 | End: 2019-12-14 | Stop reason: HOSPADM

## 2019-12-12 RX ORDER — POLYETHYLENE GLYCOL 3350 17 G/17G
1 POWDER, FOR SOLUTION ORAL
Status: DISCONTINUED | OUTPATIENT
Start: 2019-12-12 | End: 2019-12-14 | Stop reason: HOSPADM

## 2019-12-12 RX ORDER — ASPIRIN 81 MG/1
81 TABLET, CHEWABLE ORAL EVERY MORNING
Status: DISCONTINUED | OUTPATIENT
Start: 2019-12-12 | End: 2019-12-12

## 2019-12-12 RX ORDER — B-COMPLEX WITH VITAMIN C
1 TABLET ORAL DAILY
Status: DISCONTINUED | OUTPATIENT
Start: 2019-12-12 | End: 2019-12-12

## 2019-12-12 RX ORDER — OXYCODONE HYDROCHLORIDE 5 MG/1
5 TABLET ORAL
Status: DISCONTINUED | OUTPATIENT
Start: 2019-12-12 | End: 2019-12-14 | Stop reason: HOSPADM

## 2019-12-12 RX ORDER — AMOXICILLIN 250 MG
2 CAPSULE ORAL 2 TIMES DAILY
Status: DISCONTINUED | OUTPATIENT
Start: 2019-12-12 | End: 2019-12-14 | Stop reason: HOSPADM

## 2019-12-12 RX ORDER — LEVETIRACETAM 500 MG/1
1500 TABLET ORAL 3 TIMES DAILY
Status: DISCONTINUED | OUTPATIENT
Start: 2019-12-12 | End: 2019-12-13

## 2019-12-12 RX ORDER — GABAPENTIN 300 MG/1
600 CAPSULE ORAL 3 TIMES DAILY
Status: DISCONTINUED | OUTPATIENT
Start: 2019-12-12 | End: 2019-12-14 | Stop reason: HOSPADM

## 2019-12-12 RX ORDER — ACETAMINOPHEN 325 MG/1
650 TABLET ORAL EVERY 6 HOURS PRN
Status: DISCONTINUED | OUTPATIENT
Start: 2019-12-12 | End: 2019-12-14 | Stop reason: HOSPADM

## 2019-12-12 RX ORDER — BISACODYL 10 MG
10 SUPPOSITORY, RECTAL RECTAL
Status: DISCONTINUED | OUTPATIENT
Start: 2019-12-12 | End: 2019-12-14 | Stop reason: HOSPADM

## 2019-12-12 RX ORDER — ASPIRIN 325 MG
325 TABLET ORAL DAILY
Status: DISCONTINUED | OUTPATIENT
Start: 2019-12-12 | End: 2019-12-14 | Stop reason: HOSPADM

## 2019-12-12 RX ORDER — MAGNESIUM SULFATE HEPTAHYDRATE 40 MG/ML
2 INJECTION, SOLUTION INTRAVENOUS ONCE
Status: COMPLETED | OUTPATIENT
Start: 2019-12-12 | End: 2019-12-12

## 2019-12-12 RX ADMIN — OMEPRAZOLE 20 MG: 20 CAPSULE, DELAYED RELEASE ORAL at 05:29

## 2019-12-12 RX ADMIN — ENOXAPARIN SODIUM 40 MG: 100 INJECTION SUBCUTANEOUS at 06:44

## 2019-12-12 RX ADMIN — KETAMINE HYDROCHLORIDE 25 MG: 10 INJECTION, SOLUTION INTRAMUSCULAR; INTRAVENOUS at 00:09

## 2019-12-12 RX ADMIN — SODIUM CHLORIDE, POTASSIUM CHLORIDE, SODIUM LACTATE AND CALCIUM CHLORIDE: 600; 310; 30; 20 INJECTION, SOLUTION INTRAVENOUS at 14:26

## 2019-12-12 RX ADMIN — SENNOSIDES AND DOCUSATE SODIUM 2 TABLET: 8.6; 5 TABLET ORAL at 18:11

## 2019-12-12 RX ADMIN — OXYCODONE HYDROCHLORIDE 10 MG: 10 TABLET ORAL at 09:18

## 2019-12-12 RX ADMIN — LEVETIRACETAM 1500 MG: 500 TABLET ORAL at 18:04

## 2019-12-12 RX ADMIN — DULOXETINE HYDROCHLORIDE 60 MG: 60 CAPSULE, DELAYED RELEASE ORAL at 09:17

## 2019-12-12 RX ADMIN — SODIUM CHLORIDE, POTASSIUM CHLORIDE, SODIUM LACTATE AND CALCIUM CHLORIDE: 600; 310; 30; 20 INJECTION, SOLUTION INTRAVENOUS at 04:35

## 2019-12-12 RX ADMIN — MAGNESIUM SULFATE 2 G: 2 INJECTION INTRAVENOUS at 14:27

## 2019-12-12 RX ADMIN — GABAPENTIN 600 MG: 300 CAPSULE ORAL at 14:27

## 2019-12-12 RX ADMIN — KETOROLAC TROMETHAMINE 15 MG: 30 INJECTION, SOLUTION INTRAMUSCULAR at 07:42

## 2019-12-12 RX ADMIN — OXYCODONE HYDROCHLORIDE 10 MG: 10 TABLET ORAL at 05:27

## 2019-12-12 RX ADMIN — GABAPENTIN 600 MG: 300 CAPSULE ORAL at 09:17

## 2019-12-12 RX ADMIN — LEVETIRACETAM 1500 MG: 500 TABLET ORAL at 09:17

## 2019-12-12 RX ADMIN — LORAZEPAM 0.5 MG: 2 INJECTION INTRAMUSCULAR; INTRAVENOUS at 00:10

## 2019-12-12 RX ADMIN — KETOROLAC TROMETHAMINE 15 MG: 30 INJECTION, SOLUTION INTRAMUSCULAR at 14:27

## 2019-12-12 RX ADMIN — OXYCODONE HYDROCHLORIDE 10 MG: 10 TABLET ORAL at 12:08

## 2019-12-12 RX ADMIN — OXYCODONE HYDROCHLORIDE 10 MG: 10 TABLET ORAL at 16:31

## 2019-12-12 RX ADMIN — ASPIRIN 81 MG 324 MG: 81 TABLET ORAL at 03:45

## 2019-12-12 RX ADMIN — DULOXETINE HYDROCHLORIDE 60 MG: 60 CAPSULE, DELAYED RELEASE ORAL at 18:04

## 2019-12-12 RX ADMIN — LEVETIRACETAM 1500 MG: 500 TABLET ORAL at 12:08

## 2019-12-12 ASSESSMENT — ENCOUNTER SYMPTOMS
NAUSEA: 0
NECK PAIN: 0
SORE THROAT: 0
FEVER: 0
DIARRHEA: 0
PHOTOPHOBIA: 0
COUGH: 0
SEIZURES: 0
EYE PAIN: 0
FOCAL WEAKNESS: 0
MEMORY LOSS: 0
PND: 0
CHILLS: 0
STRIDOR: 0
MYALGIAS: 0
TINGLING: 0
TREMORS: 0
HEADACHES: 1
SHORTNESS OF BREATH: 0
SPUTUM PRODUCTION: 0
SPEECH CHANGE: 0
DIAPHORESIS: 0
NERVOUS/ANXIOUS: 1
FLANK PAIN: 0
DOUBLE VISION: 0
BACK PAIN: 0
HEADACHES: 0
HEMOPTYSIS: 0
DIZZINESS: 0
CLAUDICATION: 0
ORTHOPNEA: 0
WHEEZING: 0
PALPITATIONS: 0
SENSORY CHANGE: 0
CONSTIPATION: 0
DEPRESSION: 0
ABDOMINAL PAIN: 0
HEARTBURN: 0
BRUISES/BLEEDS EASILY: 0
BLURRED VISION: 0
BLOOD IN STOOL: 0
VOMITING: 0
WEAKNESS: 1

## 2019-12-12 ASSESSMENT — COPD QUESTIONNAIRES
HAVE YOU SMOKED AT LEAST 100 CIGARETTES IN YOUR ENTIRE LIFE: NO/DON'T KNOW
DURING THE PAST 4 WEEKS HOW MUCH DID YOU FEEL SHORT OF BREATH: NONE/LITTLE OF THE TIME
DO YOU EVER COUGH UP ANY MUCUS OR PHLEGM?: NO/ONLY WITH OCCASIONAL COLDS OR INFECTIONS
COPD SCREENING SCORE: 0

## 2019-12-12 ASSESSMENT — COGNITIVE AND FUNCTIONAL STATUS - GENERAL
TURNING FROM BACK TO SIDE WHILE IN FLAT BAD: A LITTLE
SUGGESTED CMS G CODE MODIFIER DAILY ACTIVITY: CK
DRESSING REGULAR LOWER BODY CLOTHING: A LITTLE
MOVING FROM LYING ON BACK TO SITTING ON SIDE OF FLAT BED: A LITTLE
MOVING TO AND FROM BED TO CHAIR: A LITTLE
CLIMB 3 TO 5 STEPS WITH RAILING: A LOT
DRESSING REGULAR UPPER BODY CLOTHING: A LITTLE
WALKING IN HOSPITAL ROOM: A LOT
DAILY ACTIVITIY SCORE: 17
SUGGESTED CMS G CODE MODIFIER MOBILITY: CK
HELP NEEDED FOR BATHING: A LITTLE
EATING MEALS: A LITTLE
MOBILITY SCORE: 16
STANDING UP FROM CHAIR USING ARMS: A LITTLE
PERSONAL GROOMING: A LITTLE
TOILETING: A LOT

## 2019-12-12 ASSESSMENT — LIFESTYLE VARIABLES
EVER HAD A DRINK FIRST THING IN THE MORNING TO STEADY YOUR NERVES TO GET RID OF A HANGOVER: NO
AVERAGE NUMBER OF DAYS PER WEEK YOU HAVE A DRINK CONTAINING ALCOHOL: 0
HOW MANY TIMES IN THE PAST YEAR HAVE YOU HAD 5 OR MORE DRINKS IN A DAY: 0
EVER_SMOKED: NEVER
CONSUMPTION TOTAL: NEGATIVE
ON A TYPICAL DAY WHEN YOU DRINK ALCOHOL HOW MANY DRINKS DO YOU HAVE: 0
TOTAL SCORE: 0
TOTAL SCORE: 0
EVER_SMOKED: NEVER
ALCOHOL_USE: NO
HAVE YOU EVER FELT YOU SHOULD CUT DOWN ON YOUR DRINKING: NO
TOTAL SCORE: 0
HAVE PEOPLE ANNOYED YOU BY CRITICIZING YOUR DRINKING: NO
EVER FELT BAD OR GUILTY ABOUT YOUR DRINKING: NO

## 2019-12-12 NOTE — PROGRESS NOTES
Trop 11. Ekg w/ no acute changes. Pt is intermittently agitated to calm. Intermittently answering questions and following commands. Updated  Darci @ BS concerning plan of care.

## 2019-12-12 NOTE — PROGRESS NOTES
Attending Hospitalist today is Dr. Velasquez.  Please call this physician for orders, updates, or questions.

## 2019-12-12 NOTE — ED NOTES
Med rec complete per pt and family at bedside   Allergies reviewed  No oral ABX within the last 14 days

## 2019-12-12 NOTE — PROGRESS NOTES
Hospital Medicine Daily Progress Note    Date of Service  12/12/2019    Chief Complaint  49 y.o. female admitted 12/11/2019 with altered mental status    Hospital Course    Per HPI,  49 y.o. female with a past medical history of pseudotumor cerebri status post  shunt placement, chronic pain and headaches status post spinal cord stimulator, seizure disorder who presented 12/11/2019 with worsening headaches and confusion for the past 3 days.  History is obtained from patient and her  at bedside.   noticed that the patient was acting more confused over the past 3 days.  She was slow to respond and had difficulty with her ADLs.  She also reported worsening left occipital headache.  She does report some dysuria.  She denies any fevers, chills, neck stiffness, nausea, vomiting, abdominal pain or diarrhea.  She follows with neurosurgery, neurology and a pain specialist.  She has not started any new medications.        Interval Problem Update  Patient is alert oriented x3, she is able to answer all questions appropriately however with a significant delay.  Upon questioning her she will hesitate for about 3 to 5 seconds and then answer questions appropriately with normal speed of speech.  Denies fevers/chills, numbness or tingling upper or lower extremities.     Consultants/Specialty  Neurology     Code Status  Full code    Disposition  TBD    Review of Systems  Review of Systems   Constitutional: Positive for malaise/fatigue. Negative for chills and fever.   HENT: Negative for congestion, hearing loss, sore throat and tinnitus.    Eyes: Negative for blurred vision, double vision, photophobia and pain.   Respiratory: Negative for cough, hemoptysis, sputum production, shortness of breath and stridor.    Cardiovascular: Negative for chest pain, palpitations, orthopnea, claudication and PND.   Gastrointestinal: Negative for blood in stool, constipation, heartburn, melena, nausea and vomiting.    Genitourinary: Negative for dysuria, frequency and urgency.   Musculoskeletal: Negative for back pain, myalgias and neck pain.   Neurological: Positive for weakness. Negative for dizziness, tingling, tremors, sensory change, speech change and headaches.   Psychiatric/Behavioral: Negative for depression, memory loss and suicidal ideas. The patient is nervous/anxious.         Physical Exam  Temp:  [36.6 °C (97.9 °F)] 36.6 °C (97.9 °F)  Pulse:  [] 133  Resp:  [14-17] 15  BP: (104-147)/(56-94) 147/76  SpO2:  [93 %-98 %] 93 %    Physical Exam  Vitals signs and nursing note reviewed.   Constitutional:       Appearance: Normal appearance.   HENT:      Head: Normocephalic and atraumatic.      Nose: No congestion.      Mouth/Throat:      Mouth: Mucous membranes are moist.      Pharynx: No oropharyngeal exudate or posterior oropharyngeal erythema.   Eyes:      Extraocular Movements: Extraocular movements intact.      Conjunctiva/sclera: Conjunctivae normal.      Pupils: Pupils are equal, round, and reactive to light.   Neck:      Musculoskeletal: Normal range of motion and neck supple. No neck rigidity.   Cardiovascular:      Pulses: Normal pulses.      Heart sounds: No murmur.   Pulmonary:      Effort: Pulmonary effort is normal. No respiratory distress.      Breath sounds: Normal breath sounds. No wheezing or rales.   Abdominal:      General: Abdomen is flat. Bowel sounds are normal. There is no distension.      Palpations: Abdomen is soft.      Tenderness: There is no tenderness. There is no guarding.   Musculoskeletal: Normal range of motion.         General: No swelling or tenderness.   Skin:     General: Skin is warm and dry.      Capillary Refill: Capillary refill takes less than 2 seconds.   Neurological:      General: No focal deficit present.      Mental Status: She is alert and oriented to person, place, and time.      Cranial Nerves: No cranial nerve deficit.   Psychiatric:         Attention and  Perception: Attention and perception normal.         Mood and Affect: Affect is blunt and flat.         Speech: Speech is delayed.         Thought Content: Thought content normal.         Fluids    Intake/Output Summary (Last 24 hours) at 12/12/2019 0736  Last data filed at 12/12/2019 0038  Gross per 24 hour   Intake 50 ml   Output --   Net 50 ml       Laboratory  Recent Labs     12/12/19  0046   WBC 6.2   RBC 4.41   HEMOGLOBIN 13.1   HEMATOCRIT 41.7   MCV 94.6   MCH 29.7   MCHC 31.4*   RDW 46.6   PLATELETCT 325   MPV 9.9     Recent Labs     12/12/19  0046   SODIUM 139   POTASSIUM 3.7   CHLORIDE 103   CO2 22   GLUCOSE 91   BUN 3*   CREATININE 0.58   CALCIUM 8.8                   Imaging  CT-HEAD W/O   Final Result         1.  No acute intracranial abnormality is identified, there are nonspecific white matter changes, commonly associated with small vessel ischemic disease.  Associated mild cerebral atrophy is noted.   2.  Atherosclerosis.           Assessment/Plan  Altered mental status- (present on admission)  Assessment & Plan  Possibly 2/2 to toxic metabolic encephalopathy?  Polypharmacy?  Vs psych?  Urinary drug screen negative  Ammonia levels within normal limits  Valproate levels pending  CT head negative for any acute intracranial activity  Continue to monitor with frequent neuro checks.   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  Continue to hold home dose of Risperdal, BuSpar and valproate    GERD (gastroesophageal reflux disease)- (present on admission)  Assessment & Plan  Continue with home dose of omeprazole    Migraine- (present on admission)  Assessment & Plan  Continue with pain control, using Tylenol and Toradol, would avoid any sedating locations.      Depression- (present on admission)  Assessment & Plan  Continue with home dose of Cymbalta       In addition to E/M for admission,    The total time spent face to face with this patient was about 33 mins 9:30-10:05am  of which 60% of time was spent on counseling, review of records including pertinent lab data and studies, as well as discussing diagnostic evaluation and work up, planned therapeutic interventions and future disposition of care. Where indicated, the assessment and plan reflect discussion of patient with consultants, other healthcare providers, family members, and additional research needed to obtain further information in formulating the plan of care of this patient.       VTE prophylaxis: Lovenox

## 2019-12-12 NOTE — CONSULTS
Chief Complaint   Patient presents with   • Migraine   • ALOC        Problem List Items Addressed This Visit     None      Visit Diagnoses     Intractable migraine with status migrainosus, unspecified migraine type        Relevant Medications    ketamine (KETALAR) 25 mg in NS 50 mL (low dose pain IVPB) (Completed)    enoxaparin (LOVENOX) inj 40 mg    acetaminophen (TYLENOL) tablet 650 mg    oxyCODONE immediate-release (ROXICODONE) tablet 5 mg    oxyCODONE immediate-release (ROXICODONE) tablet 10 mg    aspirin (ASA) tablet 325 mg    aspirin (ASA) chewable tab 324 mg    aspirin (ASA) suppository 300 mg    gabapentin (NEURONTIN) capsule 600 mg    levETIRAcetam (KEPPRA) tablet 1,500 mg    DULoxetine (CYMBALTA) capsule 60 mg    ketorolac (TORADOL) injection 15 mg      Neurology Consultation     History of present illness:  This is a 49-year old female with PMhx significant for anxiety/depression, migraine, pseudotumor cerebri s/p  shunt placement in 2015, seizure disorder (further details are limited; on Keppra), s/p spinal cord stimulator placement remotely secondary to chronic pain, further details are limited, who presented to [facility] on 12/12/19 for a chief complaint of altered mental status and headache. HPI provided primarily by patient's  at bedside. He reports that appx three days ago, he found patient confused, slow to respond; symptoms persistent and worsened over the following days, prompting presentation to the ED. At time of presentation here, CT head revealed no acute intracranial abnormality. Note no leukocytosis or fever; ammonia WNL, LFTs mildly elevated.   Currently, patient is sitting up in stretcher; she is arousable to voice, slow to respond, follows simple commands, is otherwise difficult to engage in interview and some parts of exam. Further ROS is thus limited.     Neurology has been consulted by Dr. Ridge Salomon to further evaluate the findings noted above.     Past medical  history:   Past Medical History:   Diagnosis Date   • Allergy, unspecified not elsewhere classified    • Anemia 10/05/2017    Unsure if a current issue.   • Anesthesia     Migrane after anesthesia   • Anxiety    • Anxiety, generalized    • autoimmune    • Autoimmune disorder (HCC)     Unknown etiology or type   • Clostridium difficile diarrhea     follow up tests negative   • Depression    • Elevated liver enzymes 2017    Related to meds.   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    • Pituitary abnormality (HCC)    • Requires supplemental oxygen     to treat migraines. 2-5L/NC   • Seizure (HCC) started     Unknown etiology-not sure if related to autoimmune issue. Last seizure 2019   • Staph infection    • Stroke (HCC)      with right side residual weakness       Past surgical history:   Past Surgical History:   Procedure Laterality Date   • PB IMPLANT NEUROSTIM/  2019    Procedure: INSERTION, NEUROSTIMULATOR, PERMANENT, SPINAL CORD;  Surgeon: Seven Mahoney M.D.;  Location: SURGERY [facility];  Service: Pain Management   • SPINAL CORD STIMULATOR  2018    Explanted   • FULL THICKNESS SKIN GRAFT Left 2018     graft to foot (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   • OTHER SURGICAL PROCEDURE  11/10/2017    Power port   • LIVER BIOPSY  2017   •  SHUNT INSERTION  2017     Procedure:  SHUNT INSERTION;  Surgeon: Drew Berry M.D.;  Location: SURGERY [facility];  Service:    • SPINAL CORD STIMULATOR  12/19/2016    Procedure: SPINAL CORD STIMULATOR FOR: PLACEMENT OF LEFT FLANK OCCIPITAL NERVE STIMULATOR BATTERY PACK;  Surgeon: Oli Oh III, M.D.;  Location: SURGERY [facility];  Service:    • LUMBAR EXPLORATION N/A 8/31/2015    Procedure: LUMBAR EXPLORATION- Lumbar shunt removal ;  Surgeon: Oli Oh III, M.D.;  Location: SURGERY [facility];  Service:    • OTHER NEUROLOGICAL SURG  08/2015    Explanted lumbar shunt and explanted power port due to MRSA   • IRRIGATION & DEBRIDEMENT NEURO N/A 6/28/2015    Procedure: IRRIGATION & DEBRIDEMENT NEURO;  Surgeon: Oli Oh III, M.D.;  Location: SURGERY [facility];  Service:    • SPINAL CORD STIMULATOR  2003    1st implant   • CHOLECYSTECTOMY  2001    had ruptured day before   • ENDOMETRIAL ABLATION  2001   • TUBAL COAGULATION LAPAROSCOPIC BILATERAL Bilateral 2001   • KNEE ARTHROSCOPY Right 1990   • TONSILLECTOMY  1985   • APPENDECTOMY  1982   • OTHER NEUROLOGICAL SURG  3413-6327    occipital nerve stimulator-revisions for total of 9 procedures       Family history:   Family History   Problem Relation Age of Onset   • No Known Problems Mother    • No Known Problems Father        Social history:   Social History     Socioeconomic History   • Marital status:      Spouse name: Not on file   • Number of children: Not on file   • Years of education: Not on file   • Highest education level: Not on file   Occupational History   • Occupation:      Comment: on disability   Social Needs   • Financial resource strain: Not on file   • Food insecurity:     Worry: Not on file     Inability: Not on file   • Transportation needs:     Medical: Not on file     Non-medical: Not on file   Tobacco Use   • Smoking status: Never Smoker   • Smokeless tobacco: Never Used   Substance and Sexual Activity   •  Alcohol use: Not Currently     Frequency: Never     Binge frequency: Never   • Drug use: Never   • Sexual activity: Not on file   Lifestyle   • Physical activity:     Days per week: Not on file     Minutes per session: Not on file   • Stress: Not on file   Relationships   • Social connections:     Talks on phone: Not on file     Gets together: Not on file     Attends [employer] service: Not on file     Active member of club or organization: Not on file     Attends meetings of clubs or organizations: Not on file     Relationship status: Not on file   • Intimate partner violence:     Fear of current or ex partner: Not on file     Emotionally abused: Not on file     Physically abused: Not on file     Forced sexual activity: Not on file   Other Topics Concern   • Not on file   Social History Narrative   • Not on file       Current medications:   Current Facility-Administered Medications   Medication Dose   • senna-docusate (PERICOLACE or SENOKOT S) 8.6-50 MG per tablet 2 Tab  2 Tab    And   • polyethylene glycol/lytes (MIRALAX) PACKET 1 Packet  1 Packet    And   • magnesium hydroxide (MILK OF MAGNESIA) suspension 30 mL  30 mL    And   • bisacodyl (DULCOLAX) suppository 10 mg  10 mg   • Respiratory Care per Protocol     • lactated ringers infusion     • enoxaparin (LOVENOX) inj 40 mg  40 mg   • acetaminophen (TYLENOL) tablet 650 mg  650 mg   • Pharmacy Consult Request ...Pain Management Review 1 Each  1 Each    And   • oxyCODONE immediate-release (ROXICODONE) tablet 5 mg  5 mg    And   • oxyCODONE immediate-release (ROXICODONE) tablet 10 mg  10 mg   • ondansetron (ZOFRAN) syringe/vial injection 4 mg  4 mg   • ondansetron (ZOFRAN ODT) dispertab 4 mg  4 mg   • aspirin (ASA) tablet 325 mg  325 mg    Or   • aspirin (ASA) chewable tab 324 mg  324 mg    Or   • aspirin (ASA) suppository 300 mg  300 mg   • gabapentin (NEURONTIN) capsule 600 mg  600 mg   • levETIRAcetam (KEPPRA) tablet 1,500 mg  1,500 mg   • omeprazole (PRILOSEC)  "capsule 20 mg  20 mg   • DULoxetine (CYMBALTA) capsule 60 mg  60 mg   • ketorolac (TORADOL) injection 15 mg  15 mg     Current Outpatient Medications   Medication   • divalproex (DEPAKOTE) 250 MG Tablet Delayed Response   • divalproex (DEPAKOTE) 500 MG Tablet Delayed Response   • omeprazole (PRILOSEC) 20 MG delayed-release capsule   • busPIRone (BUSPAR) 5 MG tablet   • EMGALITY 120 MG/ML Solution Auto-injector   • levETIRAcetam (KEPPRA) 500 MG Tab   • risperiDONE (RISPERDAL) 2 MG Tab   • diphenhydrAMINE (BENADRYL) 50 MG/ML Solution   • aspirin (ASA) 81 MG Chew Tab chewable tablet   • gabapentin (NEURONTIN) 600 MG tablet   • duloxetine (CYMBALTA) 60 MG CPEP delayed-release capsule       Medication Allergy:  Allergies   Allergen Reactions   • Sumatriptan Anaphylaxis     Historical=\"Heart stops.\" Reaction  between 1995 to 1997   • Prochlorperazine Swelling     Tongue swelling. Reaction as a teen. (compazine)  Tolerated Phenergan on 02/24/15   • Vancomycin Shortness of Breath and Rash     Reaction in 2005.  D/W patient 8/31/15 - tolerated loading dose of vancomycin administered on 8/30/15 with some itching to chest with decreased infusion rate. Red Man Syndrome.  2018-tolerated slow drip with benadryl pre-med-had no problems.       Review of systems:   As noted above; otherwise limited secondary to altered mental status.     Physical examination:   Vitals:    12/12/19 0531 12/12/19 0631 12/12/19 0800 12/12/19 0900   BP: 138/79 147/76 119/76 156/87   Pulse: (!) 118 (!) 133 (!) 125 (!) 120   Resp:  15 (!) 22 20   Temp:   36.1 °C (97 °F)    TempSrc:   Temporal    SpO2: 93% 93% 94% 98%   Weight:       Height:         General: Patient in no acute distress.  HEENT: Normocephalic, no signs of acute trauma.   Neck: supple, no meningeal signs or carotid bruits. There is normal range of motion. No tenderness on exam.   Chest: clear to auscultation. No cough.   CV: RRR, no murmurs.   Skin: no signs of acute rashes or trauma. "   Musculoskeletal: joints exhibit full range of motion, without any pain to palpation. There are no signs of joint or muscle swelling. There is no tenderness to deep palpation of muscles.   Psychiatric: No hallucinatory behavior.       NEUROLOGICAL EXAM:   Mental status, orientation: Arousable to voice, poorly interactive.   Speech and language: speech is bradyphrenic and hypophonic; minimal verbal output.. The patient is able to name, repeat and comprehend.   Cranial nerve exam: Pupils are 3-4 mm bilaterally and equally reactive to light and accommodation. Visual fields are intact by confrontation. There is no nystagmus on primary or secondary gaze. Intact full EOM in all directions of gaze. Face appears symmetric. Sensation in the face is intact to light touch. Tongue is midline and without any signs of tongue biting or fasciculations.   Motor exam: Strength is 4/5 in all extremities, bradykinetic throughout. Tone is normal. No abnormal movements were seen on exam.   Sensory exam reveals normal sense of light touch and pinprick in all extremities.   Deep tendon reflexes:  2+ throughout. Plantar responses are flexor. There is no clonus.   Coordination: Patient does not participate in exam.   Gait: Not assessed at this time as patient is a fall risk.       ANCILLARY DATA REVIEWED:     Lab Data Review:  Recent Results (from the past 24 hour(s))   CBC WITH DIFFERENTIAL    Collection Time: 12/12/19 12:46 AM   Result Value Ref Range    WBC 6.2 4.8 - 10.8 K/uL    RBC 4.41 4.20 - 5.40 M/uL    Hemoglobin 13.1 12.0 - 16.0 g/dL    Hematocrit 41.7 37.0 - 47.0 %    MCV 94.6 81.4 - 97.8 fL    MCH 29.7 27.0 - 33.0 pg    MCHC 31.4 (L) 33.6 - 35.0 g/dL    RDW 46.6 35.9 - 50.0 fL    Platelet Count 325 164 - 446 K/uL    MPV 9.9 9.0 - 12.9 fL    Neutrophils-Polys 60.20 44.00 - 72.00 %    Lymphocytes 29.60 22.00 - 41.00 %    Monocytes 9.10 0.00 - 13.40 %    Eosinophils 0.20 0.00 - 6.90 %    Basophils 0.60 0.00 - 1.80 %    Immature  Granulocytes 0.30 0.00 - 0.90 %    Nucleated RBC 0.00 /100 WBC    Neutrophils (Absolute) 3.72 2.00 - 7.15 K/uL    Lymphs (Absolute) 1.83 1.00 - 4.80 K/uL    Monos (Absolute) 0.56 0.00 - 0.85 K/uL    Eos (Absolute) 0.01 0.00 - 0.51 K/uL    Baso (Absolute) 0.04 0.00 - 0.12 K/uL    Immature Granulocytes (abs) 0.02 0.00 - 0.11 K/uL    NRBC (Absolute) 0.00 K/uL   COMP METABOLIC PANEL    Collection Time: 12/12/19 12:46 AM   Result Value Ref Range    Sodium 139 135 - 145 mmol/L    Potassium 3.7 3.6 - 5.5 mmol/L    Chloride 103 96 - 112 mmol/L    Co2 22 20 - 33 mmol/L    Anion Gap 14.0 (H) 0.0 - 11.9    Glucose 91 65 - 99 mg/dL    Bun 3 (L) 8 - 22 mg/dL    Creatinine 0.58 0.50 - 1.40 mg/dL    Calcium 8.8 8.5 - 10.5 mg/dL    AST(SGOT) 38 12 - 45 U/L    ALT(SGPT) 79 (H) 2 - 50 U/L    Alkaline Phosphatase 275 (H) 30 - 99 U/L    Total Bilirubin 0.5 0.1 - 1.5 mg/dL    Albumin 4.0 3.2 - 4.9 g/dL    Total Protein 7.0 6.0 - 8.2 g/dL    Globulin 3.0 1.9 - 3.5 g/dL    A-G Ratio 1.3 g/dL   ESTIMATED GFR    Collection Time: 12/12/19 12:46 AM   Result Value Ref Range    GFR If African American >60 >60 mL/min/1.73 m 2    GFR If Non African American >60 >60 mL/min/1.73 m 2   PROCALCITONIN    Collection Time: 12/12/19 12:46 AM   Result Value Ref Range    Procalcitonin <0.05 <0.25 ng/mL   MAGNESIUM    Collection Time: 12/12/19 12:46 AM   Result Value Ref Range    Magnesium 1.7 1.5 - 2.5 mg/dL   URINALYSIS    Collection Time: 12/12/19  3:51 AM   Result Value Ref Range    Color Yellow     Character Clear     Specific Gravity 1.011 <1.035    Ph 5.5 5.0 - 8.0    Glucose Negative Negative mg/dL    Ketones Negative Negative mg/dL    Protein Negative Negative mg/dL    Bilirubin Negative Negative    Urobilinogen, Urine 0.2 Negative    Nitrite Negative Negative    Leukocyte Esterase Negative Negative    Occult Blood Negative Negative    Micro Urine Req see below    Urine drug screen    Collection Time: 12/12/19  3:51 AM   Result Value Ref Range     Amphetamines Urine Negative Negative    Barbiturates Negative Negative    Benzodiazepines Negative Negative    Cocaine Metabolite Negative Negative    Methadone Negative Negative    Opiates Negative Negative    Oxycodone Negative Negative    Phencyclidine -Pcp Negative Negative    Propoxyphene Negative Negative    Cannabinoid Metab Negative Negative   AMMONIA    Collection Time: 19  6:42 AM   Result Value Ref Range    Ammonia 43 11 - 45 umol/L   VALPROIC ACID    Collection Time: 19  6:42 AM   Result Value Ref Range    Valproic Acid 19.5 (L) 50.0 - 100.0 ug/mL   TROPONIN    Collection Time: 19  7:37 AM   Result Value Ref Range    Troponin T 11 6 - 19 ng/L   EKG    Collection Time: 19  7:39 AM   Result Value Ref Range    Report       [facility]    Test Date:  2019  Pt Name:    RBIGID DAWSON                 Department: ER  MRN:        [id number]                      Room:       [facility]  Gender:     Female                       Technician: [id number]  :        1970                   Requested By:BRENDEN ALCOCER  Order #:    [id number]                    Reading MD:    Measurements  Intervals                                Axis  Rate:       110                          P:          50  CT:         156                          QRS:        -9  QRSD:       92                           T:          18  QT:         348  QTc:        471    Interpretive Statements  SINUS TACHYCARDIA  BORDERLINE T ABNORMALITIES, ANTERIOR LEADS  Compared to ECG 2019 09:12:52  No significant changes         Labs reviewed by me.     Records reviewed:   Reviewed records from previous hospital admissions/encounters at [facility].       Imaging reviewed by me:     CT-HEAD W/O   Final Result         1.  No acute intracranial abnormality is identified, there are nonspecific white matter changes, commonly associated with small vessel ischemic disease.  Associated mild cerebral atrophy is noted.    2.  Atherosclerosis.          ASSESSMENT:  49-year old female with PMhx significant for anxiety/depression, migraine (on emgality and Botox injections), pseudotumor cerebri s/p  shunt placement in 2015, seizure disorder (further details are limited; on Keppra), s/p spinal cord stimulator placement remotely secondary to chronic pain, further details are limited, who presented to [facility] on 12/12/19 for a chief complaint of altered mental status and headache.   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 Her exam is essentially non focal; she is poorly interactive, slow to respond and bradykinetic, however no objective or focal neurological findings. Additionally, her blood work thus far is unremarkable; no hyperammonemia, no leukocytosis; UA WNL, LFTs elevated compared to previous admissions, suspect this is may be related to polypharmacy.   Given the above, have high susicion for non-neurological source of symptoms; differential diagnoses include toxic/metabolic encephalopathy related to polypharmacy with mild/moderate associated azotemia/elevated liver enzymes vs psychiatric source of symptoms; may consider neurosurgical/ shunt malfunction as well though feel this less likely.       Recommendations/Plan:     -q4h and PRN neuro assessment. VS per nursing/unit protocol.  -Strongly recommend medication reconciliation per primary team/pharmacy-- consider weaning/stopping Keppra, as patient's EEG suggest PNES, and patient is reportedly taking 1500 mg BID; note gabapentin, risperdal, valproic acid among others that may also be overly sedating and hepatotoxic.   -Forgo neurodiagnostics such as imaging and EEG at this time.   -Will give one time dose of Mg 2g IV for  headache. Check Mg level in am.   -Consider psychiatry, neurosurgery evaluation; pain management follow-up after discharge.   -All other medical management per primary team.     The plan of care above has been discussed with Dr. Gutierrez. Other than the above, no further recommendations from a neurological perspective. Patient may follow up with outpatient neurology after discharge.  Please call with questions.     Dafne Delatorre MSN, BSN, AGNP-C  [facility]

## 2019-12-12 NOTE — ASSESSMENT & PLAN NOTE
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  Hold valproic acid, Buspar and Risperdal  We will consider consulting neurology in the morning for further recommendations

## 2019-12-12 NOTE — ASSESSMENT & PLAN NOTE
Evaluate for toxic metabolic encephalopathy  Check UA and drug screen  Check valproic acid levels  Check ammonia  CT head without contrast negative for acute intracranial abnormality  If patient remains confused we will consider a neurology/neurosurgical consultation for further recommendations

## 2019-12-12 NOTE — ED NOTES
Pt medicated per mar, waiting for other medications at this time.     Pt is calm and cooperative and is responding more appropriately, in a timely manner. Will continue to monitor.

## 2019-12-12 NOTE — ED PROVIDER NOTES
ED Provider Note    CHIEF COMPLAINT  Chief Complaint   Patient presents with   • Migraine   • ALOC       HPI  Enedelia Pang is a 49 y.o. female who presents with a headache.  The patient has a complex headache history as she has a history of pseudotumor cerebri requiring a  shunt.  She also has a seizure disorder and chronic pain due to headaches.  She has an stimulator in the occipital aspect for pain control.  She states she is been unable to control the pain over the last 3 days.  She states the pain is behind her right eye and described as sharp.  She states is severe in intensity.  She does have photo and phonophobia.  She is also had some global weakness to her lower extremities and upper extremities.  She does not have any focal weakness.  She has not had any fevers.  She has nausea but she does not have any vomiting.  She states she been compliant with her medication.    REVIEW OF SYSTEMS  See HPI for further details. All other systems are negative.     PAST MEDICAL HISTORY  Past Medical History:   Diagnosis Date   • Joint pain 09/10/2019    Especially right shoulder   • MRSA (methicillin resistant Staphylococcus aureus) 2018    to neurostimulator.    • MRSA (methicillin resistant Staphylococcus aureus) 12/2017    left foot autoimmune decay   • Anemia 10/05/2017    Unsure if a current issue.   • Elevated liver enzymes 2017    Related to meds.   • MRSA (methicillin resistant Staphylococcus aureus) 08/2015    to lumbar shunt and power port   • Hydrocephalus (HCC) 2015    with lumbar shunt   • H/O Clostridium difficile infection 2014   • Clostridium difficile diarrhea 2014    follow up tests negative   • Stroke (HCC) 2007     with right side residual weakness   • Hx MRSA infection 2003    with neurostimulator implant   • Pituitary abnormality (HCC) 2002   • Allergy, unspecified not elsewhere classified    • Anesthesia     Migrane after anesthesia   • Anxiety    • Anxiety, generalized    • autoimmune    •  Autoimmune disorder (HCC)     Unknown etiology or type   • Depression    • Migraine with aura, with intractable migraine, so stated, without mention of status migrainosus     from undefined autoimmune issue   • Requires supplemental oxygen     to treat migraines. 2-5L/NC   • Seizure (HCC) started 2017    Unknown etiology-not sure if related to autoimmune issue. Last seizure 2/2019   • Staph infection        FAMILY HISTORY  [unfilled]    SOCIAL HISTORY  Social History     Socioeconomic History   • Marital status:      Spouse name: Not on file   • Number of children: Not on file   • Years of education: Not on file   • Highest education level: Not on file   Occupational History   • Occupation:      Comment: on disability   Social Needs   • Financial resource strain: Not on file   • Food insecurity:     Worry: Not on file     Inability: Not on file   • Transportation needs:     Medical: Not on file     Non-medical: Not on file   Tobacco Use   • Smoking status: Never Smoker   • Smokeless tobacco: Never Used   Substance and Sexual Activity   • Alcohol use: Not Currently     Frequency: Never     Binge frequency: Never   • Drug use: Never   • Sexual activity: Not on file   Lifestyle   • Physical activity:     Days per week: Not on file     Minutes per session: Not on file   • Stress: Not on file   Relationships   • Social connections:     Talks on phone: Not on file     Gets together: Not on file     Attends [employer] service: Not on file     Active member of club or organization: Not on file     Attends meetings of clubs or organizations: Not on file     Relationship status: Not on file   • Intimate partner violence:     Fear of current or ex partner: Not on file     Emotionally abused: Not on file     Physically abused: Not on file     Forced sexual activity: Not on file   Other Topics Concern   • Not on file   Social History Narrative   • Not on file       SURGICAL HISTORY  Past Surgical History:    Procedure Laterality Date   • PB IMPLANT NEUROSTIM/  2019    Procedure: INSERTION, NEUROSTIMULATOR, PERMANENT, SPINAL CORD;  Surgeon: Seven Mahoney M.D.;  Location: SURGERY [facility];  Service: Pain Management   • SPINAL CORD STIMULATOR  2018    Explanted   • FULL THICKNESS SKIN GRAFT Left 2018     graft to foot (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   • OTHER SURGICAL PROCEDURE  11/10/2017    Power port   • LIVER BIOPSY  2017   •  SHUNT INSERTION  2017    Procedure:  SHUNT INSERTION;  Surgeon: Drew Berry M.D.;  Location: SURGERY [facility];  Service:    • SPINAL CORD STIMULATOR  2016    Procedure: SPINAL CORD STIMULATOR FOR: PLACEMENT OF LEFT FLANK OCCIPITAL NERVE STIMULATOR BATTERY PACK;  Surgeon: Oli Oh III, M.D.;  Location: SURGERY [facility];  Service:    • LUMBAR EXPLORATION N/A 2015    Procedure: LUMBAR EXPLORATION- Lumbar shunt removal ;  Surgeon: Oli Oh III, M.D.;  Location: SURGERY [facility];  Service:    • OTHER NEUROLOGICAL SURG  2015    Explanted lumbar shunt and explanted power port due to MRSA   • IRRIGATION & DEBRIDEMENT NEURO N/A 2015    Procedure: IRRIGATION & DEBRIDEMENT NEURO;  Surgeon: Oli Oh III, M.D.;  Location: [facility];  Service:    • SPINAL CORD STIMULATOR      1st implant   • CHOLECYSTECTOMY      had ruptured day before   • ENDOMETRIAL ABLATION     • TUBAL COAGULATION LAPAROSCOPIC BILATERAL Bilateral    • KNEE ARTHROSCOPY Right    • TONSILLECTOMY     • APPENDECTOMY     • OTHER NEUROLOGICAL SURG  [id number]    occipital nerve stimulator-revisions for total of 9 procedures       CURRENT MEDICATIONS  Home Medications     Reviewed by Gina Schuster R.N. (Registered Nurse) on 19 at 2340  Med List Status: Complete   Medication Last Dose Status   aspirin (ASA) 81 MG Chew Tab chewable tablet  Active   B Complex  "Vitamins (VITAMIN B COMPLEX) Tab  Active   busPIRone (BUSPAR) 5 MG tablet 12/11/2019 Active   diphenhydrAMINE (BENADRYL) 50 MG/ML Solution 12/11/2019 Active   divalproex (DEPAKOTE) 250 MG Tablet Delayed Response 12/11/2019 Active   divalproex (DEPAKOTE) 500 MG Tablet Delayed Response 12/10/2019 Active   duloxetine (CYMBALTA) 60 MG CPEP delayed-release capsule 12/11/2019 Active   EMGALITY 120 MG/ML Solution Auto-injector 11/13/2019 Active   gabapentin (NEURONTIN) 600 MG tablet 12/11/2019 Active   levETIRAcetam (KEPPRA) 500 MG Tab 12/11/2019 Active   Multiple Vitamins-Minerals (MULTIVITAMIN ADULT) Tab  Active   omeprazole (PRILOSEC) 20 MG delayed-release capsule 12/11/2019 Active   risperiDONE (RISPERDAL) 2 MG Tab 12/11/2019 Active                ALLERGIES  Allergies   Allergen Reactions   • Sumatriptan Anaphylaxis     Historical=\"Heart stops.\" Reaction  between 1995 to 1997   • Prochlorperazine Swelling     Tongue swelling. Reaction as a teen. (compazine)  Tolerated Phenergan on 02/24/15   • Vancomycin Shortness of Breath and Rash     Reaction in 2005.  D/W patient 8/31/15 - tolerated loading dose of vancomycin administered on 8/30/15 with some itching to chest with decreased infusion rate. Red Man Syndrome.  2018-tolerated slow drip with benadryl pre-med-had no problems.       PHYSICAL EXAM  VITAL SIGNS: /94   Pulse (!) 110   Temp 36.6 °C (97.9 °F) (Oral)   Resp 16   Ht 1.6 m (5' 3\")   Wt 77.4 kg (170 lb 10.2 oz)   SpO2 98%   BMI 30.23 kg/m²  Room air O2: 98    Constitutional: In acute distress, Non-toxic appearance.   HENT: Normocephalic, Atraumatic, Bilateral external ears normal, Oropharynx moist, No oral exudates, Nose normal.   Eyes: PERRLA, EOMI, Conjunctiva normal, No discharge.   Neck: Normal range of motion, No tenderness, Supple, No stridor.   Lymphatic: No lymphadenopathy noted.   Cardiovascular: Tachycardic heart rate, Normal rhythm, No murmurs, No rubs, No gallops.   Thorax & Lungs: Normal " breath sounds, No respiratory distress, No wheezing, No chest tenderness.   Abdomen: Bowel sounds normal, Soft, No tenderness, No masses, No pulsatile masses.   Skin: Warm, Dry, No erythema, No rash.   Back: No tenderness, No CVA tenderness.   Extremities: Intact distal pulses, No edema, No tenderness, No cyanosis, No clubbing.   Musculoskeletal: Good range of motion in all major joints. No tenderness to palpation or major deformities noted.   Neurologic: Alert & oriented x 3, Normal motor function, Normal sensory function, No focal deficits noted.   Psychiatric: Affect normal, Judgment normal, Mood normal.     RADIOLOGY/PROCEDURES  CT-HEAD W/O   Final Result         1.  No acute intracranial abnormality is identified, there are nonspecific white matter changes, commonly associated with small vessel ischemic disease.  Associated mild cerebral atrophy is noted.   2.  Atherosclerosis.            COURSE & MEDICAL DECISION MAKING  Pertinent Labs & Imaging studies reviewed. (See chart for details)  This a 49-year-old female who presents the emergency department with a migraine and altered mental status.  I suspect this is all from her migraine however the duration is little bit atypical.  The patient received ketamine as this was effective in the past.  It has given her some relief but she continues to have a headache behind her right eye.  She does not have any meningeal findings.  She has not had any fevers nor does she have a leukocytosis to support infectious process.  Due to her confusion, continued pain, and difficulties with ambulation due to generalized weakness will bit the patient to the hospital.  I was concern for possible metabolic disturbance causing her difficulty with ambulation.  This is bilateral in nature.  CT scan of the head does not show any acute abnormalities.    FINAL IMPRESSION  1.  Headache  2.  Altered mental status    Disposition  The patient will be admitted in stable condition          Electronically signed by: Adi Palmer, 12/11/2019 11:51 PM

## 2019-12-12 NOTE — ED TRIAGE NOTES
Patient to ED with spouse with complaints of confusion, photosenstivity, LE numbness, severe migraine HA. She has hx of migraine,  shunt, seizure. Reports ha X3 days. Worse today. No relief with home medications. Pale appearing in triage. Disoriented to date. Slow response with questions. [place]. PERRL 4mm. No arm drift or facial droop. Speech is clear.     Pt educated on ED process and asked to wait in lobby. Patient educated on importance of alerting staff to new or worsening symptoms or concerns.

## 2019-12-12 NOTE — ED NOTES
Pt is becoming more slow to respond and exhibiting R hand tremors. The pt also states she has tingling in her arms/legs bilaterally. Paged hospitalist for pt changes.

## 2019-12-12 NOTE — ED NOTES
RN at bedside; Pt re-educated about the need for urine sample. Pt denies the need to urinate at this time.

## 2019-12-12 NOTE — ED NOTES
Pt attempting to climb out of bed appearing agitated. Pt states she is in a lot of pain in her head. Pt medicated per MAR.

## 2019-12-12 NOTE — PROGRESS NOTES
Received care of pt from NOC RN this am. Pt noted to ambulate with  this am to BR. Pt is agitated, slow  C/O chest pain. Tech to do EKG. Called Dr. Velasquez. Discussed pt. Trop ordered and drawn.

## 2019-12-12 NOTE — ED NOTES
Pt asleep on gurney; Family at bedside updated on plan of care. Pt family has no questions at this time.

## 2019-12-12 NOTE — PROGRESS NOTES
Pt's condition w/o change. Discussed pt's care with Hospitalist & Hospitalist RN. Dr. Velasquez in to see pt today. No new orders received. Pt awaiting a neuro bed.

## 2019-12-12 NOTE — H&P
Hospital Medicine History & Physical Note    Date of Service  12/12/2019    Primary Care Physician  Elliott Power M.D.    Consultants  None    Code Status  Full code    Chief Complaint  Headache and altered mental status    History of Presenting Illness  49 y.o. female with a past medical history of pseudotumor cerebri status post  shunt placement, chronic pain and headaches status post spinal cord stimulator, seizure disorder who presented 12/11/2019 with worsening headaches and confusion for the past 3 days.  History is obtained from patient and her  at bedside.   noticed that the patient was acting more confused over the past 3 days.  She was slow to respond and had difficulty with her ADLs.  She also reported worsening left occipital headache.  She does report some dysuria.  She denies any fevers, chills, neck stiffness, nausea, vomiting, abdominal pain or diarrhea.  She follows with neurosurgery, neurology and a pain specialist.  She has not started any new medications.    Review of Systems  Review of Systems   Constitutional: Negative for chills, diaphoresis and fever.   HENT: Negative for hearing loss and sore throat.    Eyes: Negative for blurred vision.   Respiratory: Negative for cough, sputum production, shortness of breath and wheezing.    Cardiovascular: Negative for chest pain, palpitations and leg swelling.   Gastrointestinal: Negative for abdominal pain, blood in stool, diarrhea, nausea and vomiting.   Genitourinary: Positive for dysuria. Negative for flank pain and urgency.   Musculoskeletal: Negative for back pain, joint pain, myalgias and neck pain.   Skin: Negative for rash.   Neurological: Positive for headaches. Negative for dizziness, focal weakness and seizures.        Confusion   Endo/Heme/Allergies: Does not bruise/bleed easily.   Psychiatric/Behavioral: Negative for suicidal ideas.   All other systems reviewed and are negative.      Past Medical History   has a past  "medical history of Allergy, unspecified not elsewhere classified, Anemia (10/05/2017), Anesthesia, Anxiety, Anxiety, generalized, autoimmune, Autoimmune disorder ([facility]), Clostridium difficile diarrhea (2014), Depression, Elevated liver enzymes (2017), H/O Clostridium difficile infection (2014), MRSA infection (2003), Hydrocephalus ([facility]) (2015), Joint pain (09/10/2019), Migraine with aura, with intractable migraine, so stated, without mention of status migrainosus, MRSA (methicillin resistant Staphylococcus aureus) (08/2015), MRSA (methicillin resistant Staphylococcus aureus) (12/2017), MRSA (methicillin resistant Staphylococcus aureus) (2018), Pituitary abnormality ([facility]) (2002), Requires supplemental oxygen, Seizure ([facility]) (started 2017), Staph infection, and Stroke ([facility]) (2007). She also has no past medical history of Addisons disease ([facility]).    Surgical History   has a past surgical history that includes tonsillectomy (1985); other neurological surg ([id number]); irrigation & debridement neuro (N/A, 6/28/2015); lumbar exploration (N/A, 8/31/2015); spinal cord stimulator (12/19/2016);  shunt insertion (5/31/2017); liver biopsy (07/24/2017); cholecystectomy (2001); appendectomy (1982); spinal cord stimulator (05/24/2018); spinal cord stimulator (2003); knee arthroscopy (Right, 1990); full thickness skin graft (Left, 04/2018); other surgical procedure (11/10/2017); other neurological surg (08/2015); endometrial ablation (2001); tubal coagulation laparoscopic bilateral (Bilateral, 2001); and pr implant neurostim/ (9/13/2019).     Family History  family history includes No Known Problems in her father and mother.     Social History   reports that she has never smoked. She has never used smokeless tobacco. She reports previous alcohol use. She reports that she does not use drugs.    Allergies  Allergies   Allergen Reactions   • Sumatriptan Anaphylaxis     Historical=\"Heart stops.\" Reaction  between 1995 to 1997 "   • Prochlorperazine Swelling     Tongue swelling. Reaction as a teen. (compazine)  Tolerated Phenergan on 02/24/15   • Vancomycin Shortness of Breath and Rash     Reaction in .  D/W patient 8/31/15 - tolerated loading dose of vancomycin administered on 8/30/15 with some itching to chest with decreased infusion rate. Red Man Syndrome.  2018-tolerated slow drip with benadryl pre-med-had no problems.       Medications  Prior to Admission Medications   Prescriptions Last Dose Informant Patient Reported? Taking?   B Complex Vitamins (VITAMIN B COMPLEX) Tab  Patient Yes No   Sig: Take 1 Tab by mouth every day.   EMGALITY 120 MG/ML Solution Auto-injector 2019 Patient Yes No   Sig: Inject 120 mg as instructed Q30 DAYS.   Multiple Vitamins-Minerals (MULTIVITAMIN ADULT) Tab  Patient Yes No   Sig: Take 1 Tab by mouth every day.   aspirin (ASA) 81 MG Chew Tab chewable tablet  Patient Yes No   Sig: Take 81 mg by mouth every morning.   busPIRone (BUSPAR) 5 MG tablet 2019 at Unknown time Patient Yes No   Sig: Take 5 mg by mouth 2 times a day.   diphenhydrAMINE (BENADRYL) 50 MG/ML Solution 2019 at 1000 Patient Yes No   Si mg by Intramuscular route every 6 hours as needed (Migraines). Indications: migraine   divalproex (DEPAKOTE) 250 MG Tablet Delayed Response 2019 at 1000  No No   Sig: Take 1 Tab by mouth every 12 hours.   divalproex (DEPAKOTE) 500 MG Tablet Delayed Response 12/10/2019 at Unknown time  No No   Sig: Take 1 Tab by mouth every bedtime.   duloxetine (CYMBALTA) 60 MG CPEP delayed-release capsule 2019 at Unknown time Patient Yes No   Sig: Take 60 mg by mouth 2 times a day.   gabapentin (NEURONTIN) 600 MG tablet 2019 at 1000 Patient Yes No   Sig: Take 600 mg by mouth 3 times a day.   levETIRAcetam (KEPPRA) 500 MG Tab 2019 at 1000 Patient Yes No   Sig: Take 1,500 mg by mouth 3 times a day.   omeprazole (PRILOSEC) 20 MG delayed-release capsule 2019 at 1000 Patient  Yes No   Sig: Take 20 mg by mouth every day.   risperiDONE (RISPERDAL) 2 MG Tab 12/11/2019 at Unknown time Patient Yes No   Sig: Take 2 mg by mouth 2 times a day.      Facility-Administered Medications: None       Physical Exam  Temp:  [36.6 °C (97.9 °F)] 36.6 °C (97.9 °F)  Pulse:  [] 84  Resp:  [14-16] 14  BP: (117-146)/(74-94) 123/83  SpO2:  [98 %] 98 %    Physical Exam  Vitals signs and nursing note reviewed.   Constitutional:       General: She is not in acute distress.     Appearance: Normal appearance.   HENT:      Head: Normocephalic and atraumatic.      Nose: Nose normal.      Mouth/Throat:      Mouth: Mucous membranes are moist.   Eyes:      Extraocular Movements: Extraocular movements intact.      Conjunctiva/sclera: Conjunctivae normal.      Pupils: Pupils are equal, round, and reactive to light.   Neck:      Musculoskeletal: Normal range of motion and neck supple.   Cardiovascular:      Rate and Rhythm: Normal rate and regular rhythm.      Pulses: Normal pulses.      Heart sounds: Normal heart sounds.   Pulmonary:      Effort: Pulmonary effort is normal. No respiratory distress.      Breath sounds: Normal breath sounds. No wheezing, rhonchi or rales.   Abdominal:      General: Bowel sounds are normal. There is no distension.      Palpations: Abdomen is soft.      Tenderness: There is no tenderness.   Musculoskeletal: Normal range of motion.         General: No swelling or tenderness.   Lymphadenopathy:      Cervical: No cervical adenopathy.   Skin:     General: Skin is warm.      Coloration: Skin is not jaundiced.      Findings: No rash.   Neurological:      General: No focal deficit present.      Mental Status: She is alert.      Cranial Nerves: No cranial nerve deficit.      Motor: No weakness.      Comments: Disoriented to person and time  Bradykinesia  Strength and sensation intact bilaterally   Psychiatric:         Mood and Affect: Mood normal.         Behavior: Behavior normal.          Laboratory:  Recent Labs     12/12/19 0046   WBC 6.2   RBC 4.41   HEMOGLOBIN 13.1   HEMATOCRIT 41.7   MCV 94.6   MCH 29.7   MCHC 31.4*   RDW 46.6   PLATELETCT 325   MPV 9.9     Recent Labs     12/12/19 0046   SODIUM 139   POTASSIUM 3.7   CHLORIDE 103   CO2 22   GLUCOSE 91   BUN 3*   CREATININE 0.58   CALCIUM 8.8     Recent Labs     12/12/19 0046   ALTSGPT 79*   ASTSGOT 38   ALKPHOSPHAT 275*   TBILIRUBIN 0.5   GLUCOSE 91         No results for input(s): NTPROBNP in the last 72 hours.      No results for input(s): TROPONINT in the last 72 hours.    Urinalysis:    No results found     Imaging:  CT-HEAD W/O   Final Result         1.  No acute intracranial abnormality is identified, there are nonspecific white matter changes, commonly associated with small vessel ischemic disease.  Associated mild cerebral atrophy is noted.   2.  Atherosclerosis.            Assessment/Plan:  I anticipate this patient is appropriate for observation status at this time.    Altered mental status- (present on admission)  Assessment & Plan  Evaluate for toxic metabolic encephalopathy  Check UA and drug screen  Check valproic acid levels  Check ammonia  CT head without contrast negative for acute intracranial abnormality  If patient remains confused we will consider a neurology/neurosurgical consultation for further recommendations    Bradykinesia- (present on admission)  Assessment & Plan  Concern for drug-induced parkinsonism  Multiple medications which may cause drug-induced parkinsonism.  Hold valproic acid, Buspar and Risperdal  We will consider consulting neurology in the morning for further recommendations    GERD (gastroesophageal reflux disease)- (present on admission)  Assessment & Plan  Continue omeprazole    Migraine- (present on admission)  Assessment & Plan  Pain control with Tylenol, oxycodone and IV Toradol  IV Zofran PRN      Depression- (present on admission)  Assessment & Plan  Continue Cymbalta      VTE  prophylaxis: lovenox

## 2019-12-13 PROCEDURE — A9270 NON-COVERED ITEM OR SERVICE: HCPCS | Performed by: INTERNAL MEDICINE

## 2019-12-13 PROCEDURE — 700111 HCHG RX REV CODE 636 W/ 250 OVERRIDE (IP): Performed by: INTERNAL MEDICINE

## 2019-12-13 PROCEDURE — G0378 HOSPITAL OBSERVATION PER HR: HCPCS

## 2019-12-13 PROCEDURE — 96375 TX/PRO/DX INJ NEW DRUG ADDON: CPT

## 2019-12-13 PROCEDURE — 700102 HCHG RX REV CODE 250 W/ 637 OVERRIDE(OP): Performed by: INTERNAL MEDICINE

## 2019-12-13 PROCEDURE — 700111 HCHG RX REV CODE 636 W/ 250 OVERRIDE (IP): Performed by: HOSPITALIST

## 2019-12-13 PROCEDURE — 700101 HCHG RX REV CODE 250: Performed by: HOSPITALIST

## 2019-12-13 PROCEDURE — 97165 OT EVAL LOW COMPLEX 30 MIN: CPT

## 2019-12-13 PROCEDURE — 97161 PT EVAL LOW COMPLEX 20 MIN: CPT

## 2019-12-13 PROCEDURE — 99225 PR SUBSEQUENT OBSERVATION CARE,LEVEL II: CPT | Performed by: HOSPITALIST

## 2019-12-13 PROCEDURE — A9270 NON-COVERED ITEM OR SERVICE: HCPCS | Performed by: HOSPITALIST

## 2019-12-13 PROCEDURE — 96376 TX/PRO/DX INJ SAME DRUG ADON: CPT

## 2019-12-13 PROCEDURE — 700102 HCHG RX REV CODE 250 W/ 637 OVERRIDE(OP): Performed by: HOSPITALIST

## 2019-12-13 RX ORDER — DIPHENHYDRAMINE HYDROCHLORIDE 50 MG/ML
INJECTION INTRAMUSCULAR; INTRAVENOUS
Status: ACTIVE
Start: 2019-12-13 | End: 2019-12-13

## 2019-12-13 RX ORDER — LEVETIRACETAM 500 MG/1
1000 TABLET ORAL 3 TIMES DAILY
Status: DISCONTINUED | OUTPATIENT
Start: 2019-12-13 | End: 2019-12-14 | Stop reason: HOSPADM

## 2019-12-13 RX ORDER — DIPHENHYDRAMINE HYDROCHLORIDE 50 MG/ML
50 INJECTION INTRAMUSCULAR; INTRAVENOUS EVERY 6 HOURS PRN
Status: DISCONTINUED | OUTPATIENT
Start: 2019-12-13 | End: 2019-12-14 | Stop reason: HOSPADM

## 2019-12-13 RX ORDER — DIPHENHYDRAMINE HYDROCHLORIDE 50 MG/ML
25 INJECTION INTRAMUSCULAR; INTRAVENOUS EVERY 6 HOURS PRN
Status: DISCONTINUED | OUTPATIENT
Start: 2019-12-13 | End: 2019-12-13

## 2019-12-13 RX ADMIN — DULOXETINE HYDROCHLORIDE 60 MG: 60 CAPSULE, DELAYED RELEASE ORAL at 17:20

## 2019-12-13 RX ADMIN — OXYCODONE HYDROCHLORIDE 10 MG: 10 TABLET ORAL at 14:54

## 2019-12-13 RX ADMIN — LEVETIRACETAM 1000 MG: 500 TABLET ORAL at 17:20

## 2019-12-13 RX ADMIN — ACETAMINOPHEN 650 MG: 325 TABLET, FILM COATED ORAL at 14:54

## 2019-12-13 RX ADMIN — ACETAMINOPHEN 650 MG: 325 TABLET, FILM COATED ORAL at 22:42

## 2019-12-13 RX ADMIN — GABAPENTIN 600 MG: 300 CAPSULE ORAL at 22:46

## 2019-12-13 RX ADMIN — KETAMINE HYDROCHLORIDE 25 MG: 10 INJECTION, SOLUTION INTRAMUSCULAR; INTRAVENOUS at 22:29

## 2019-12-13 RX ADMIN — POLYETHYLENE GLYCOL 3350 1 PACKET: 17 POWDER, FOR SOLUTION ORAL at 17:30

## 2019-12-13 RX ADMIN — KETOROLAC TROMETHAMINE 15 MG: 30 INJECTION, SOLUTION INTRAMUSCULAR at 17:12

## 2019-12-13 RX ADMIN — DIPHENHYDRAMINE HYDROCHLORIDE 50 MG: 50 INJECTION INTRAMUSCULAR; INTRAVENOUS at 17:13

## 2019-12-13 ASSESSMENT — ENCOUNTER SYMPTOMS
DOUBLE VISION: 0
PND: 0
HEADACHES: 0
STRIDOR: 0
NAUSEA: 0
COUGH: 0
SPUTUM PRODUCTION: 0
SHORTNESS OF BREATH: 0
SPEECH CHANGE: 0
DIZZINESS: 0
ORTHOPNEA: 0
MEMORY LOSS: 0
PALPITATIONS: 0
MYALGIAS: 0
WEAKNESS: 1
VOMITING: 0
HEARTBURN: 0
BACK PAIN: 0
CHILLS: 0
BLOOD IN STOOL: 0
HEMOPTYSIS: 0
SENSORY CHANGE: 0
TREMORS: 0
PHOTOPHOBIA: 0
CLAUDICATION: 0
DEPRESSION: 0
BLURRED VISION: 0
EYE PAIN: 0
FEVER: 0
CONSTIPATION: 0
SORE THROAT: 0
NECK PAIN: 0
NERVOUS/ANXIOUS: 0
TINGLING: 0

## 2019-12-13 NOTE — PROGRESS NOTES
Hospital Medicine Daily Progress Note    Date of Service  12/13/2019    Chief Complaint  49 y.o. female admitted 12/11/2019 with altered mental status    Hospital Course    Per HPI,  49 y.o. female with a past medical history of pseudotumor cerebri status post  shunt placement, chronic pain and headaches status post spinal cord stimulator, seizure disorder who presented 12/11/2019 with worsening headaches and confusion for the past 3 days.  History is obtained from patient and her  at bedside.   noticed that the patient was acting more confused over the past 3 days.  She was slow to respond and had difficulty with her ADLs.  She also reported worsening left occipital headache.  She does report some dysuria.  She denies any fevers, chills, neck stiffness, nausea, vomiting, abdominal pain or diarrhea.  She follows with neurosurgery, neurology and a pain specialist.  She has not started any new medications.        Interval Problem Update  Patient is alert oriented x3, she is able to answer all questions appropriately however with a significant delay.  Upon questioning her she will hesitate for about 3 to 5 seconds and then answer questions appropriately with normal speed of speech.  Denies fevers/chills, numbness or tingling upper or lower extremities.     12/13  Overall patient looks much better clinically, she is alert oriented x3, she is able answer all questions appropriately without any delay of speech.  She is complaining of lower extremity spasms however which she says is chronic.  Otherwise denies fevers chills, worsening of numbness and tingling which she intermittently has in her lower extremities.  No bowel or urinary incontinence.    Consultants/Specialty  Neurology   Psychiatry consulted    Code Status  Full code    Disposition  TBD    Review of Systems  Review of Systems   Constitutional: Positive for malaise/fatigue. Negative for chills and fever.   HENT: Negative for  congestion, hearing loss, sore throat and tinnitus.    Eyes: Negative for blurred vision, double vision, photophobia and pain.   Respiratory: Negative for cough, hemoptysis, sputum production, shortness of breath and stridor.    Cardiovascular: Negative for chest pain, palpitations, orthopnea, claudication and PND.   Gastrointestinal: Negative for blood in stool, constipation, heartburn, melena, nausea and vomiting.   Genitourinary: Negative for dysuria, frequency and urgency.   Musculoskeletal: Negative for back pain, myalgias and neck pain.   Neurological: Positive for weakness. Negative for dizziness, tingling, tremors, sensory change, speech change and headaches.   Psychiatric/Behavioral: Negative for depression, memory loss and suicidal ideas. The patient is not nervous/anxious.    All other systems reviewed and are negative.       Physical Exam  Temp:  [36.2 °C (97.2 °F)-36.6 °C (97.8 °F)] 36.2 °C (97.2 °F)  Pulse:  [] 91  Resp:  [12-18] 16  BP: ()/(55-65) 102/57  SpO2:  [96 %-100 %] 97 %    Physical Exam  Vitals signs and nursing note reviewed.   Constitutional:       Appearance: Normal appearance.   HENT:      Head: Normocephalic and atraumatic.      Nose: No congestion.      Mouth/Throat:      Mouth: Mucous membranes are moist.      Pharynx: No oropharyngeal exudate or posterior oropharyngeal erythema.   Eyes:      Extraocular Movements: Extraocular movements intact.      Conjunctiva/sclera: Conjunctivae normal.      Pupils: Pupils are equal, round, and reactive to light.   Neck:      Musculoskeletal: Normal range of motion and neck supple. No neck rigidity.   Cardiovascular:      Pulses: Normal pulses.      Heart sounds: No murmur.   Pulmonary:      Effort: Pulmonary effort is normal. No respiratory distress.      Breath sounds: Normal breath sounds. No wheezing or rales.   Abdominal:      General: Abdomen is flat. Bowel sounds are normal. There is no distension.      Palpations: Abdomen is  soft.      Tenderness: There is no tenderness. There is no guarding.   Musculoskeletal: Normal range of motion.         General: No swelling or tenderness.   Skin:     General: Skin is warm and dry.      Capillary Refill: Capillary refill takes less than 2 seconds.   Neurological:      General: No focal deficit present.      Mental Status: She is alert and oriented to person, place, and time.      Cranial Nerves: No cranial nerve deficit or dysarthria.      Motor: Motor function is intact. No weakness, tremor, atrophy, seizure activity or pronator drift.      Coordination: Coordination is intact.      Gait: Gait is intact.   Psychiatric:         Attention and Perception: Attention and perception normal.         Mood and Affect: Affect is not blunt or flat.         Speech: Speech is not delayed.         Thought Content: Thought content normal.         Fluids    Intake/Output Summary (Last 24 hours) at 12/13/2019 1527  Last data filed at 12/13/2019 1000  Gross per 24 hour   Intake 480 ml   Output --   Net 480 ml       Laboratory  Recent Labs     12/12/19  0046   WBC 6.2   RBC 4.41   HEMOGLOBIN 13.1   HEMATOCRIT 41.7   MCV 94.6   MCH 29.7   MCHC 31.4*   RDW 46.6   PLATELETCT 325   MPV 9.9     Recent Labs     12/12/19  0046   SODIUM 139   POTASSIUM 3.7   CHLORIDE 103   CO2 22   GLUCOSE 91   BUN 3*   CREATININE 0.58   CALCIUM 8.8                   Imaging  CT-HEAD W/O   Final Result         1.  No acute intracranial abnormality is identified, there are nonspecific white matter changes, commonly associated with small vessel ischemic disease.  Associated mild cerebral atrophy is noted.   2.  Atherosclerosis.           Assessment/Plan  Altered mental status- (present on admission)  Assessment & Plan  Possibly 2/2 to toxic metabolic encephalopathy?  Polypharmacy?  Vs psych?  Urinary drug screen negative  Ammonia levels within normal limits  Valproate levels 19  CT head negative for any acute intracranial activity  Continue  to monitor with frequent neuro checks.   Neurology was consulted, they recommended not to perform additional imaging or EEGs, recommended weaning and stopping Keppra slowly.  Also recommended a psychiatry consult  Decreased her Keppra from 1500 mg 3 times daily to thousand grams 3 times daily will continue to wean every several days.    Bradykinesia- (present on admission)  Assessment & Plan  Possibly drug induced parkinsonism?  Continue to to hold home Risperdal, BuSpar and valproate, overall patient appears to be clinically improving    GERD (gastroesophageal reflux disease)- (present on admission)  Assessment & Plan  Continue with home dose of omeprazole    Migraine- (present on admission)  Assessment & Plan  Continue with pain control, using Tylenol and Toradol, would avoid any sedating locations.      Depression- (present on admission)  Assessment & Plan  Continue with home dose of Cymbalta    Patient plan of care discussed at multidisplinary team rounds and with patient and R.N at beside.       VTE prophylaxis: Lovenox

## 2019-12-13 NOTE — THERAPY
Physical Therapy evaluation completed. PT eval documented in paper form. Please refer to hard chart.     Deb Jimenez, PT, DPT  [phone]

## 2019-12-13 NOTE — THERAPY
OT evaluation completed- 1x only appears to be near baseline, Recommend HH and possible cognitive eval. See hard chart for full details

## 2019-12-13 NOTE — PROGRESS NOTES
Pt arrived to room 189-2. Pt drowsy but arousable. Pt AOx2.   Pt states 9/10 HA. Medicated with PRN pain medication.  Denies any SOB, chest pain or dizziness.   Pt complains of numbness in BLE. Per  this is not new.    Pt has weak equal grasps bilaterally.  Pt 4/5 strength in all extremities.  Pt on 2L NC,  in place. VSS.  Tele and SCD's placed on pt.  Aspiration, Fall and seizure precautions in place.   updated on plan of care.

## 2019-12-13 NOTE — PROGRESS NOTES
Pt to [place] to neuro. Charge CAROLINA Angu[facility] Bel[facility]ings collected.  @ side. Called report to Lisa on Neuro.

## 2019-12-13 NOTE — PROGRESS NOTES
Skin check complete. Scars on upper back and right lower back from previous surgery. Coccyx and heels intact.

## 2019-12-14 VITALS
WEIGHT: 170.64 LBS | BODY MASS INDEX: 30.23 KG/M2 | HEIGHT: 63 IN | DIASTOLIC BLOOD PRESSURE: 53 MMHG | TEMPERATURE: 97.5 F | RESPIRATION RATE: 17 BRPM | HEART RATE: 76 BPM | OXYGEN SATURATION: 100 % | SYSTOLIC BLOOD PRESSURE: 104 MMHG

## 2019-12-14 PROBLEM — G43.909 MIGRAINE: Status: RESOLVED | Noted: 2019-12-12 | Resolved: 2019-12-14

## 2019-12-14 PROBLEM — R41.82 ALTERED MENTAL STATUS: Status: RESOLVED | Noted: 2019-12-12 | Resolved: 2019-12-14

## 2019-12-14 PROBLEM — R25.8 BRADYKINESIA: Status: RESOLVED | Noted: 2019-12-12 | Resolved: 2019-12-14

## 2019-12-14 LAB
ANION GAP SERPL CALC-SCNC: 5 MMOL/L (ref 0–11.9)
BASOPHILS # BLD AUTO: 0.6 % (ref 0–1.8)
BASOPHILS # BLD: 0.03 K/UL (ref 0–0.12)
BUN SERPL-MCNC: 6 MG/DL (ref 8–22)
CALCIUM SERPL-MCNC: 8.7 MG/DL (ref 8.5–10.5)
CHLORIDE SERPL-SCNC: 102 MMOL/L (ref 96–112)
CO2 SERPL-SCNC: 30 MMOL/L (ref 20–33)
CREAT SERPL-MCNC: 0.46 MG/DL (ref 0.5–1.4)
EOSINOPHIL # BLD AUTO: 0.06 K/UL (ref 0–0.51)
EOSINOPHIL NFR BLD: 1.1 % (ref 0–6.9)
ERYTHROCYTE [DISTWIDTH] IN BLOOD BY AUTOMATED COUNT: 44.3 FL (ref 35.9–50)
GLUCOSE SERPL-MCNC: 89 MG/DL (ref 65–99)
HCT VFR BLD AUTO: 35.6 % (ref 37–47)
HGB BLD-MCNC: 11.4 G/DL (ref 12–16)
IMM GRANULOCYTES # BLD AUTO: 0.02 K/UL (ref 0–0.11)
IMM GRANULOCYTES NFR BLD AUTO: 0.4 % (ref 0–0.9)
LYMPHOCYTES # BLD AUTO: 1.34 K/UL (ref 1–4.8)
LYMPHOCYTES NFR BLD: 25.1 % (ref 22–41)
MAGNESIUM SERPL-MCNC: 1.6 MG/DL (ref 1.5–2.5)
MCH RBC QN AUTO: 29.8 PG (ref 27–33)
MCHC RBC AUTO-ENTMCNC: 32 G/DL (ref 33.6–35)
MCV RBC AUTO: 93.2 FL (ref 81.4–97.8)
MONOCYTES # BLD AUTO: 0.67 K/UL (ref 0–0.85)
MONOCYTES NFR BLD AUTO: 12.5 % (ref 0–13.4)
NEUTROPHILS # BLD AUTO: 3.22 K/UL (ref 2–7.15)
NEUTROPHILS NFR BLD: 60.3 % (ref 44–72)
NRBC # BLD AUTO: 0 K/UL
NRBC BLD-RTO: 0 /100 WBC
PLATELET # BLD AUTO: 316 K/UL (ref 164–446)
PMV BLD AUTO: 9.4 FL (ref 9–12.9)
POTASSIUM SERPL-SCNC: 3.9 MMOL/L (ref 3.6–5.5)
RBC # BLD AUTO: 3.82 M/UL (ref 4.2–5.4)
SODIUM SERPL-SCNC: 137 MMOL/L (ref 135–145)
WBC # BLD AUTO: 5.3 K/UL (ref 4.8–10.8)

## 2019-12-14 PROCEDURE — 700111 HCHG RX REV CODE 636 W/ 250 OVERRIDE (IP): Performed by: INTERNAL MEDICINE

## 2019-12-14 PROCEDURE — 83735 ASSAY OF MAGNESIUM: CPT

## 2019-12-14 PROCEDURE — 80048 BASIC METABOLIC PNL TOTAL CA: CPT

## 2019-12-14 PROCEDURE — 99217 PR OBSERVATION CARE DISCHARGE: CPT | Performed by: HOSPITALIST

## 2019-12-14 PROCEDURE — 96376 TX/PRO/DX INJ SAME DRUG ADON: CPT

## 2019-12-14 PROCEDURE — 85025 COMPLETE CBC W/AUTO DIFF WBC: CPT

## 2019-12-14 PROCEDURE — 700102 HCHG RX REV CODE 250 W/ 637 OVERRIDE(OP): Performed by: HOSPITALIST

## 2019-12-14 PROCEDURE — 700105 HCHG RX REV CODE 258: Performed by: INTERNAL MEDICINE

## 2019-12-14 PROCEDURE — A9270 NON-COVERED ITEM OR SERVICE: HCPCS | Performed by: INTERNAL MEDICINE

## 2019-12-14 PROCEDURE — 700102 HCHG RX REV CODE 250 W/ 637 OVERRIDE(OP): Performed by: INTERNAL MEDICINE

## 2019-12-14 PROCEDURE — 700111 HCHG RX REV CODE 636 W/ 250 OVERRIDE (IP): Performed by: HOSPITALIST

## 2019-12-14 PROCEDURE — A9270 NON-COVERED ITEM OR SERVICE: HCPCS | Performed by: HOSPITALIST

## 2019-12-14 PROCEDURE — G0378 HOSPITAL OBSERVATION PER HR: HCPCS

## 2019-12-14 PROCEDURE — 96372 THER/PROPH/DIAG INJ SC/IM: CPT

## 2019-12-14 PROCEDURE — 99215 OFFICE O/P EST HI 40 MIN: CPT | Mod: GC | Performed by: PSYCHIATRY & NEUROLOGY

## 2019-12-14 RX ADMIN — KETOROLAC TROMETHAMINE 15 MG: 30 INJECTION, SOLUTION INTRAMUSCULAR at 00:03

## 2019-12-14 RX ADMIN — ENOXAPARIN SODIUM 40 MG: 100 INJECTION SUBCUTANEOUS at 05:11

## 2019-12-14 RX ADMIN — OMEPRAZOLE 20 MG: 20 CAPSULE, DELAYED RELEASE ORAL at 05:10

## 2019-12-14 RX ADMIN — LEVETIRACETAM 1000 MG: 500 TABLET ORAL at 13:05

## 2019-12-14 RX ADMIN — KETOROLAC TROMETHAMINE 15 MG: 30 INJECTION, SOLUTION INTRAMUSCULAR at 06:42

## 2019-12-14 RX ADMIN — HEPARIN 500 UNITS: 100 SYRINGE at 14:41

## 2019-12-14 RX ADMIN — DIPHENHYDRAMINE HYDROCHLORIDE 50 MG: 50 INJECTION INTRAMUSCULAR; INTRAVENOUS at 00:04

## 2019-12-14 RX ADMIN — KETOROLAC TROMETHAMINE 15 MG: 30 INJECTION, SOLUTION INTRAMUSCULAR at 13:06

## 2019-12-14 RX ADMIN — SODIUM CHLORIDE, POTASSIUM CHLORIDE, SODIUM LACTATE AND CALCIUM CHLORIDE: 600; 310; 30; 20 INJECTION, SOLUTION INTRAVENOUS at 05:09

## 2019-12-14 RX ADMIN — ASPIRIN 325 MG: 325 TABLET, FILM COATED ORAL at 05:11

## 2019-12-14 RX ADMIN — DULOXETINE HYDROCHLORIDE 60 MG: 60 CAPSULE, DELAYED RELEASE ORAL at 05:10

## 2019-12-14 RX ADMIN — GABAPENTIN 600 MG: 300 CAPSULE ORAL at 13:05

## 2019-12-14 RX ADMIN — DIPHENHYDRAMINE HYDROCHLORIDE 50 MG: 50 INJECTION INTRAMUSCULAR; INTRAVENOUS at 13:06

## 2019-12-14 RX ADMIN — DIPHENHYDRAMINE HYDROCHLORIDE 50 MG: 50 INJECTION INTRAMUSCULAR; INTRAVENOUS at 06:42

## 2019-12-14 RX ADMIN — OXYCODONE HYDROCHLORIDE 10 MG: 10 TABLET ORAL at 03:31

## 2019-12-14 RX ADMIN — LEVETIRACETAM 1000 MG: 500 TABLET ORAL at 05:11

## 2019-12-14 RX ADMIN — SENNOSIDES AND DOCUSATE SODIUM 2 TABLET: 8.6; 5 TABLET ORAL at 05:12

## 2019-12-14 RX ADMIN — GABAPENTIN 600 MG: 300 CAPSULE ORAL at 05:11

## 2019-12-14 ASSESSMENT — PATIENT HEALTH QUESTIONNAIRE - PHQ9
1. LITTLE INTEREST OR PLEASURE IN DOING THINGS: SEVERAL DAYS
2. FEELING DOWN, DEPRESSED, IRRITABLE, OR HOPELESS: SEVERAL DAYS
SUM OF ALL RESPONSES TO PHQ QUESTIONS 1-9: 10
SUM OF ALL RESPONSES TO PHQ9 QUESTIONS 1 AND 2: 2
4. FEELING TIRED OR HAVING LITTLE ENERGY: NEARLY EVERY DAY
5. POOR APPETITE OR OVEREATING: SEVERAL DAYS
3. TROUBLE FALLING OR STAYING ASLEEP OR SLEEPING TOO MUCH: MORE THAN HALF THE DAYS
6. FEELING BAD ABOUT YOURSELF - OR THAT YOU ARE A FAILURE OR HAVE LET YOURSELF OR YOUR FAMILY DOWN: SEVERAL DAYS
8. MOVING OR SPEAKING SO SLOWLY THAT OTHER PEOPLE COULD HAVE NOTICED. OR THE OPPOSITE, BEING SO FIGETY OR RESTLESS THAT YOU HAVE BEEN MOVING AROUND A LOT MORE THAN USUAL: NOT AT ALL
7. TROUBLE CONCENTRATING ON THINGS, SUCH AS READING THE NEWSPAPER OR WATCHING TELEVISION: SEVERAL DAYS
9. THOUGHTS THAT YOU WOULD BE BETTER OFF DEAD, OR OF HURTING YOURSELF: NOT AT ALL

## 2019-12-14 NOTE — CARE PLAN
Problem: Fluid Volume:  Goal: Will maintain balanced intake and output  Outcome: PROGRESSING AS EXPECTED     Problem: Psychosocial Needs:  Goal: Level of anxiety will decrease  Outcome: PROGRESSING AS EXPECTED  Note:   Psych brought on board for added assistance with pt coping skills     Problem: Communication  Goal: The ability to communicate needs accurately and effectively will improve  Outcome: PROGRESSING AS EXPECTED     Problem: Safety  Goal: Will remain free from injury  Outcome: PROGRESSING AS EXPECTED  Goal: Will remain free from falls  Outcome: PROGRESSING AS EXPECTED     Problem: Infection  Goal: Will remain free from infection  Outcome: PROGRESSING AS EXPECTED     Problem: Venous Thromboembolism (VTW)/Deep Vein Thrombosis (DVT) Prevention:  Goal: Patient will participate in Venous Thrombosis (VTE)/Deep Vein Thrombosis (DVT)Prevention Measures  Outcome: PROGRESSING AS EXPECTED     Problem: Bowel/Gastric:  Goal: Normal bowel function is maintained or improved  Outcome: PROGRESSING AS EXPECTED  Goal: Will not experience complications related to bowel motility  Outcome: PROGRESSING AS EXPECTED     Problem: Respiratory:  Goal: Respiratory status will improve  Outcome: PROGRESSING AS EXPECTED

## 2019-12-14 NOTE — DISCHARGE SUMMARY
Discharge Summary    CHIEF COMPLAINT ON ADMISSION  Chief Complaint   Patient presents with   • Migraine   • ALOC       Reason for Admission  ALOC     Admission Date  12/11/2019    CODE STATUS  Full Code    HPI & HOSPITAL COURSE   is a very pleasant 49 y.o female admitted on 12/11/2019 for worsening headaches and apparent confusion for several days.  Given the complexity of her past medical history, neurology was consulted, they did not think that patient warranted any further imaging.  They did not think that her moving slowly was secondary to bradykinesia but was rather more secondary to a psychomotor effect, secondary from overmedication.  However given the that she has been on multiple medications chronically, is difficult to discern.  Nevertheless with IV Toradol, Benadryl and supportive measures patient's clinical status improved to her baseline.  Indeed during her hospitalization patient's  and patient were pushing to get ketamine.  As result I discussed the case again with neurology who did not believe there is any contraindication nevertheless this is not something that we are accustomed to.  Nevertheless given that patient said that that improves her symptoms a low-dose ketamine infusion was given yesterday with patient says she feels much better at her baseline currently.  Denies having any vision changes headache chest pain shortness of breath or nausea vomiting I recommended her to follow-up with her outpatient neurologist for any other medication adjustments necessary to avoid further complications.  Patient also has a pain physician she follows and recommended follow-up as well with that person.          Therefore, she is discharged in good and stable condition to home with close outpatient follow-up.      Discharge Date  12/14/2019    FOLLOW UP ITEMS POST DISCHARGE  PCP and neurology in 1 week    DISCHARGE DIAGNOSES  Active Problems:    GERD (gastroesophageal reflux disease) POA:  Yes    Depression POA: Yes  Resolved Problems:    Altered mental status POA: Yes    Bradykinesia POA: Yes    Migraine POA: Yes      FOLLOW UP  No future appointments.  No follow-up provider specified.    MEDICATIONS ON DISCHARGE     Medication List      CHANGE how you take these medications      Instructions   * divalproex 250 MG Tbec  What changed:  when to take this  Commonly known as:  DEPAKOTE   Take 1 Tab by mouth every 12 hours.  Dose:  250 mg     * divalproex 500 MG Tbec  What changed:  Another medication with the same name was changed. Make sure you understand how and when to take each.  Commonly known as:  DEPAKOTE   Take 1 Tab by mouth every bedtime.  Dose:  500 mg         * This list has 2 medication(s) that are the same as other medications prescribed for you. Read the directions carefully, and ask your doctor or other care provider to review them with you.            CONTINUE taking these medications      Instructions   aspirin 81 MG Chew chewable tablet  Commonly known as:  ASA   Take 81 mg by mouth every morning.  Dose:  81 mg     busPIRone 5 MG tablet  Commonly known as:  BUSPAR   Take 5 mg by mouth 2 times a day.  Dose:  5 mg     diphenhydrAMINE 50 MG/ML Soln  Commonly known as:  BENADRYL   50 mg by Intramuscular route every 6 hours as needed (Migraines). Indications: migraine  Dose:  50 mg     DULoxetine 60 MG Cpep delayed-release capsule  Commonly known as:  CYMBALTA   Take 60 mg by mouth 2 times a day.  Dose:  60 mg     EMGALITY 120 MG/ML Soaj  Generic drug:  Galcanezumab-gnlm   Inject 120 mg as instructed Q30 DAYS.  Dose:  120 mg     gabapentin 600 MG tablet  Commonly known as:  NEURONTIN   Take 600 mg by mouth 3 times a day.  Dose:  600 mg     levETIRAcetam 500 MG Tabs  Commonly known as:  KEPPRA   Take 1,500 mg by mouth 3 times a day.  Dose:  1,500 mg     omeprazole 20 MG delayed-release capsule  Commonly known as:  PRILOSEC   Take 20 mg by mouth every day.  Dose:  20 mg     risperiDONE 2 MG  "Tabs  Commonly known as:  RISPERDAL   Take 2 mg by mouth 2 times a day.  Dose:  2 mg            Allergies  Allergies   Allergen Reactions   • Sumatriptan Anaphylaxis     Historical=\"Heart stops.\" Reaction  between 1995 to 1997   • Prochlorperazine Swelling     Tongue swelling. Reaction as a teen. (compazine)  Tolerated Phenergan on 02/24/15   • Vancomycin Shortness of Breath and Rash     Reaction in 2005.  D/W patient 8/31/15 - tolerated loading dose of vancomycin administered on 8/30/15 with some itching to chest with decreased infusion rate. Red Man Syndrome.  2018-tolerated slow drip with benadryl pre-med-had no problems.       DIET  Orders Placed This Encounter   Procedures   • Diet Order Cardiac     Standing Status:   Standing     Number of Occurrences:   1     Order Specific Question:   Diet:     Answer:   Cardiac [6]       ACTIVITY  As tolerated.  Weight bearing as tolerated    CONSULTATIONS  Neurology [facility]    PROCEDURES  None    LABORATORY  Lab Results   Component Value Date    SODIUM 137 12/14/2019    POTASSIUM 3.9 12/14/2019    CHLORIDE 102 12/14/2019    CO2 30 12/14/2019    GLUCOSE 89 12/14/2019    BUN 6 (L) 12/14/2019    CREATININE 0.46 (L) 12/14/2019        Lab Results   Component Value Date    WBC 5.3 12/14/2019    HEMOGLOBIN 11.4 (L) 12/14/2019    HEMATOCRIT 35.6 (L) 12/14/2019    PLATELETCT 316 12/14/2019        Total time of the discharge process exceeds 35 minutes.  "

## 2019-12-14 NOTE — FACE TO FACE
Face to Face Supporting Documentation - Home Health    The encounter with this patient was in whole or in part the primary reason for home health admission.    Date of encounter:   Patient:                    MRN:                       YOB: 2017  Enedelia Pang  [id number]  1970     Home health to see patient for:  Skilled Nursing care for assessment, interventions & education, Medical social work consult, Home health aide, Physical Therapy evaluation and treatment and Occupational therapy evaluation and treatment    Skilled need for:  Exacerbation of Chronic Disease State migraines and neurological issue    Skilled nursing interventions to include:  Comment: na    Homebound status evidenced by:  Need the aid of supportive devices such as crutches, canes, wheelchairs or walkers, Require the use of special transportation, Needs the assistance of another person in order to leave the home or Have a condition such that leaving his or her home is medically contraindicated. Leaving home requires a considerable and taxing effort. There is a normal inability to leave the home.    Community Physician to provide follow up care: Elliott Power M.D.     Optional Interventions? No      I certify the face to face encounter for this home health care referral meets the CMS requirements and the encounter/clinical assessment with the patient was, in whole, or in part, for the medical condition(s) listed above, which is the primary reason for home health care. Based on my clinical findings: the service(s) are medically necessary, support the need for home health care, and the homebound criteria are met.  I certify that this patient has had a face to face encounter by myself.  Kala Velasquez M.D. - NPI: [id number]
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
## 2019-12-14 NOTE — DISCHARGE INSTRUCTIONS
Discharge Instructions    Discharged to home by car with relative. Discharged via wheelchair, hospital escort: Refused.  Special equipment needed: Not Applicable    Be sure to schedule a follow-up appointment with your primary care doctor or any specialists as instructed.     Discharge Plan:   Diet Plan: Discussed  Activity Level: Discussed  Confirmed Follow up Appointment: Patient to Call and Schedule Appointment  Confirmed Symptoms Management: Discussed  Medication Reconciliation Updated: Yes  Influenza Vaccine Indication: Not indicated: Previously immunized this influenza season and > 8 years of age    I understand that a diet low in cholesterol, fat, and sodium is recommended for good health. Unless I have been given specific instructions below for another diet, I accept this instruction as my diet prescription.   Other diet: regular    Special Instructions: None    · Is patient discharged on Warfarin / Coumadin?   No     Depression / Suicide Risk    As you are discharged from this [facility] facility, it is important to learn how to keep safe from harming yourself.    Recognize the warning signs:  · Abrupt changes in personality, positive or negative- including increase in energy   · Giving away possessions  · Change in eating patterns- significant weight changes-  positive or negative  · Change in sleeping patterns- unable to sleep or sleeping all the time   · Unwillingness or inability to communicate  · Depression  · Unusual sadness, discouragement and loneliness  · Talk of wanting to die  · Neglect of personal appearance   · Rebelliousness- reckless behavior  · Withdrawal from people/activities they love  · Confusion- inability to concentrate     If you or a loved one observes any of these behaviors or has concerns about self-harm, here's what you can do:  · Talk about it- your feelings and reasons for harming yourself  · Remove any means that you might use to hurt yourself (examples: pills, rope,  extension cords, firearm)  · Get professional help from the community (Mental Health, Substance Abuse, psychological counseling)  · Do not be alone:Call your Safe Contact- someone whom you trust who will be there for you.  · Call your local CRISIS HOTLINE 840-8712 or 505-251-6794  · Call your local Children's Mobile Crisis Response Team [place] (726) 057-9576 or www.[employer]  · Call the toll free National Suicide Prevention Hotlines   · National Suicide Prevention Lifeline 595-354-VCZH (3758)  · National Hope Line Network 800-SUICIDE (202-9374)

## 2019-12-14 NOTE — DISCHARGE PLANNING
Anticipated Discharge Disposition: home with home health    Action: Spoke with pt and obtained choice for home health: 1. [facility]. 2. [facility]. 3. [facility]. 4. [facility]. 5. [facility]. Faxed to [facility] at [phone].     Barriers to Discharge: HH acceptance.     Plan: Follow up with HH. Plan is to DC home today.     Subjective   Bogdan Larsen 

## 2019-12-14 NOTE — PROGRESS NOTES
Monitor summary: SR/ST 83- 103, NJ .16, QRS .10, QT .36 with no ectopy per strip from monitor room.

## 2019-12-14 NOTE — DISCHARGE PLANNING
Received Choice form at 1255  Agency/Facility Name: [facility]  Referral sent per Choice form @ [phone]

## 2019-12-14 NOTE — CARE PLAN
Problem: Pain Management  Goal: Pain level will decrease to patient's comfort goal  Outcome: PROGRESSING AS EXPECTED     Problem: Psychosocial Needs:  Goal: Level of anxiety will decrease  Outcome: PROGRESSING AS EXPECTED     Problem: Safety  Goal: Will remain free from injury  Outcome: PROGRESSING AS EXPECTED  Goal: Will remain free from falls  Outcome: PROGRESSING AS EXPECTED     Problem: Bowel/Gastric:  Goal: Normal bowel function is maintained or improved  Outcome: PROGRESSING AS EXPECTED     Problem: Knowledge Deficit  Goal: Knowledge of disease process/condition, treatment plan, diagnostic tests, and medications will improve  Outcome: PROGRESSING AS EXPECTED

## 2020-01-28 ENCOUNTER — HOSPITAL ENCOUNTER (EMERGENCY)
Facility: MEDICAL CENTER | Age: 50
End: 2020-01-28
Payer: MEDICARE

## 2020-01-28 ENCOUNTER — APPOINTMENT (OUTPATIENT)
Dept: RADIOLOGY | Facility: MEDICAL CENTER | Age: 50
DRG: 102 | End: 2020-01-28
Attending: EMERGENCY MEDICINE
Payer: MEDICARE

## 2020-01-28 ENCOUNTER — HOSPITAL ENCOUNTER (INPATIENT)
Facility: MEDICAL CENTER | Age: 50
LOS: 4 days | DRG: 102 | End: 2020-02-03
Attending: EMERGENCY MEDICINE | Admitting: HOSPITALIST
Payer: MEDICARE

## 2020-01-28 DIAGNOSIS — G43.109 COMPLICATED MIGRAINE: ICD-10-CM

## 2020-01-28 LAB
ABO + RH BLD: NORMAL
ABO GROUP BLD: NORMAL
ALBUMIN SERPL BCP-MCNC: 4.5 G/DL (ref 3.2–4.9)
ALBUMIN/GLOB SERPL: 1.4 G/DL
ALP SERPL-CCNC: 104 U/L (ref 30–99)
ALT SERPL-CCNC: 24 U/L (ref 2–50)
ANION GAP SERPL CALC-SCNC: 12 MMOL/L (ref 0–11.9)
APTT PPP: 29 SEC (ref 24.7–36)
AST SERPL-CCNC: 21 U/L (ref 12–45)
BASOPHILS # BLD AUTO: 0.9 % (ref 0–1.8)
BASOPHILS # BLD: 0.05 K/UL (ref 0–0.12)
BILIRUB SERPL-MCNC: 0.2 MG/DL (ref 0.1–1.5)
BLD GP AB SCN SERPL QL: NORMAL
BUN SERPL-MCNC: 3 MG/DL (ref 8–22)
CALCIUM SERPL-MCNC: 9.6 MG/DL (ref 8.5–10.5)
CHLORIDE SERPL-SCNC: 107 MMOL/L (ref 96–112)
CO2 SERPL-SCNC: 16 MMOL/L (ref 20–33)
CREAT SERPL-MCNC: 0.66 MG/DL (ref 0.5–1.4)
EKG IMPRESSION: NORMAL
EOSINOPHIL # BLD AUTO: 0.01 K/UL (ref 0–0.51)
EOSINOPHIL NFR BLD: 0.2 % (ref 0–6.9)
ERYTHROCYTE [DISTWIDTH] IN BLOOD BY AUTOMATED COUNT: 49.9 FL (ref 35.9–50)
GLOBULIN SER CALC-MCNC: 3.2 G/DL (ref 1.9–3.5)
GLUCOSE BLD-MCNC: 84 MG/DL (ref 65–99)
GLUCOSE SERPL-MCNC: 77 MG/DL (ref 65–99)
HCT VFR BLD AUTO: 42.3 % (ref 37–47)
HGB BLD-MCNC: 13.6 G/DL (ref 12–16)
IMM GRANULOCYTES # BLD AUTO: 0.01 K/UL (ref 0–0.11)
IMM GRANULOCYTES NFR BLD AUTO: 0.2 % (ref 0–0.9)
INR PPP: 0.97 (ref 0.87–1.13)
LYMPHOCYTES # BLD AUTO: 2.16 K/UL (ref 1–4.8)
LYMPHOCYTES NFR BLD: 40.6 % (ref 22–41)
MCH RBC QN AUTO: 29.5 PG (ref 27–33)
MCHC RBC AUTO-ENTMCNC: 32.2 G/DL (ref 33.6–35)
MCV RBC AUTO: 91.8 FL (ref 81.4–97.8)
MONOCYTES # BLD AUTO: 0.29 K/UL (ref 0–0.85)
MONOCYTES NFR BLD AUTO: 5.5 % (ref 0–13.4)
NEUTROPHILS # BLD AUTO: 2.8 K/UL (ref 2–7.15)
NEUTROPHILS NFR BLD: 52.6 % (ref 44–72)
NRBC # BLD AUTO: 0 K/UL
NRBC BLD-RTO: 0 /100 WBC
PLATELET # BLD AUTO: 358 K/UL (ref 164–446)
PMV BLD AUTO: 9.6 FL (ref 9–12.9)
POTASSIUM SERPL-SCNC: 4.3 MMOL/L (ref 3.6–5.5)
PROT SERPL-MCNC: 7.7 G/DL (ref 6–8.2)
PROTHROMBIN TIME: 13.1 SEC (ref 12–14.6)
RBC # BLD AUTO: 4.61 M/UL (ref 4.2–5.4)
RH BLD: NORMAL
SODIUM SERPL-SCNC: 135 MMOL/L (ref 135–145)
TROPONIN T SERPL-MCNC: 6 NG/L (ref 6–19)
WBC # BLD AUTO: 5.3 K/UL (ref 4.8–10.8)

## 2020-01-28 PROCEDURE — 86901 BLOOD TYPING SEROLOGIC RH(D): CPT

## 2020-01-28 PROCEDURE — 86900 BLOOD TYPING SEROLOGIC ABO: CPT

## 2020-01-28 PROCEDURE — 70498 CT ANGIOGRAPHY NECK: CPT

## 2020-01-28 PROCEDURE — 96365 THER/PROPH/DIAG IV INF INIT: CPT

## 2020-01-28 PROCEDURE — 70450 CT HEAD/BRAIN W/O DYE: CPT

## 2020-01-28 PROCEDURE — 80053 COMPREHEN METABOLIC PANEL: CPT

## 2020-01-28 PROCEDURE — 96375 TX/PRO/DX INJ NEW DRUG ADDON: CPT

## 2020-01-28 PROCEDURE — 86850 RBC ANTIBODY SCREEN: CPT

## 2020-01-28 PROCEDURE — 71045 X-RAY EXAM CHEST 1 VIEW: CPT

## 2020-01-28 PROCEDURE — 82962 GLUCOSE BLOOD TEST: CPT

## 2020-01-28 PROCEDURE — 85610 PROTHROMBIN TIME: CPT

## 2020-01-28 PROCEDURE — 700111 HCHG RX REV CODE 636 W/ 250 OVERRIDE (IP): Performed by: EMERGENCY MEDICINE

## 2020-01-28 PROCEDURE — 700101 HCHG RX REV CODE 250: Performed by: EMERGENCY MEDICINE

## 2020-01-28 PROCEDURE — 96374 THER/PROPH/DIAG INJ IV PUSH: CPT

## 2020-01-28 PROCEDURE — G0378 HOSPITAL OBSERVATION PER HR: HCPCS

## 2020-01-28 PROCEDURE — 700105 HCHG RX REV CODE 258: Performed by: EMERGENCY MEDICINE

## 2020-01-28 PROCEDURE — 84484 ASSAY OF TROPONIN QUANT: CPT

## 2020-01-28 PROCEDURE — 85730 THROMBOPLASTIN TIME PARTIAL: CPT

## 2020-01-28 PROCEDURE — 99285 EMERGENCY DEPT VISIT HI MDM: CPT

## 2020-01-28 PROCEDURE — 700102 HCHG RX REV CODE 250 W/ 637 OVERRIDE(OP): Performed by: HOSPITALIST

## 2020-01-28 PROCEDURE — 93005 ELECTROCARDIOGRAM TRACING: CPT | Performed by: EMERGENCY MEDICINE

## 2020-01-28 PROCEDURE — 99220 PR INITIAL OBSERVATION CARE,LEVL III: CPT | Performed by: HOSPITALIST

## 2020-01-28 PROCEDURE — 36415 COLL VENOUS BLD VENIPUNCTURE: CPT

## 2020-01-28 PROCEDURE — 94760 N-INVAS EAR/PLS OXIMETRY 1: CPT

## 2020-01-28 PROCEDURE — 85025 COMPLETE CBC W/AUTO DIFF WBC: CPT

## 2020-01-28 PROCEDURE — 700117 HCHG RX CONTRAST REV CODE 255: Performed by: EMERGENCY MEDICINE

## 2020-01-28 PROCEDURE — 70496 CT ANGIOGRAPHY HEAD: CPT

## 2020-01-28 PROCEDURE — A9270 NON-COVERED ITEM OR SERVICE: HCPCS | Performed by: HOSPITALIST

## 2020-01-28 RX ORDER — LEVETIRACETAM 500 MG/1
500 TABLET ORAL 2 TIMES DAILY
Status: DISCONTINUED | OUTPATIENT
Start: 2020-01-28 | End: 2020-02-03 | Stop reason: HOSPADM

## 2020-01-28 RX ORDER — GABAPENTIN 300 MG/1
600 CAPSULE ORAL 3 TIMES DAILY
Status: DISCONTINUED | OUTPATIENT
Start: 2020-01-28 | End: 2020-02-03 | Stop reason: HOSPADM

## 2020-01-28 RX ORDER — POLYETHYLENE GLYCOL 3350 17 G/17G
1 POWDER, FOR SOLUTION ORAL
Status: DISCONTINUED | OUTPATIENT
Start: 2020-01-28 | End: 2020-02-03 | Stop reason: HOSPADM

## 2020-01-28 RX ORDER — DIVALPROEX SODIUM 250 MG/1
250 TABLET, DELAYED RELEASE ORAL 3 TIMES DAILY
COMMUNITY
End: 2021-04-08

## 2020-01-28 RX ORDER — SODIUM CHLORIDE, SODIUM LACTATE, POTASSIUM CHLORIDE, CALCIUM CHLORIDE 600; 310; 30; 20 MG/100ML; MG/100ML; MG/100ML; MG/100ML
INJECTION, SOLUTION INTRAVENOUS CONTINUOUS
Status: DISCONTINUED | OUTPATIENT
Start: 2020-01-28 | End: 2020-02-03 | Stop reason: HOSPADM

## 2020-01-28 RX ORDER — AMOXICILLIN 250 MG
2 CAPSULE ORAL 2 TIMES DAILY
Status: DISCONTINUED | OUTPATIENT
Start: 2020-01-28 | End: 2020-02-03 | Stop reason: HOSPADM

## 2020-01-28 RX ORDER — DIVALPROEX SODIUM 500 MG/1
500 TABLET, DELAYED RELEASE ORAL
Status: DISCONTINUED | OUTPATIENT
Start: 2020-01-28 | End: 2020-02-03 | Stop reason: HOSPADM

## 2020-01-28 RX ORDER — MORPHINE SULFATE 4 MG/ML
4 INJECTION, SOLUTION INTRAMUSCULAR; INTRAVENOUS ONCE
Status: COMPLETED | OUTPATIENT
Start: 2020-01-28 | End: 2020-01-28

## 2020-01-28 RX ORDER — RISPERIDONE 0.5 MG/1
2 TABLET ORAL 2 TIMES DAILY
Status: DISCONTINUED | OUTPATIENT
Start: 2020-01-28 | End: 2020-02-03 | Stop reason: HOSPADM

## 2020-01-28 RX ORDER — ONDANSETRON 2 MG/ML
4 INJECTION INTRAMUSCULAR; INTRAVENOUS ONCE
Status: COMPLETED | OUTPATIENT
Start: 2020-01-28 | End: 2020-01-28

## 2020-01-28 RX ORDER — DULOXETIN HYDROCHLORIDE 60 MG/1
60 CAPSULE, DELAYED RELEASE ORAL 2 TIMES DAILY
Status: DISCONTINUED | OUTPATIENT
Start: 2020-01-28 | End: 2020-02-03 | Stop reason: HOSPADM

## 2020-01-28 RX ORDER — BISACODYL 10 MG
10 SUPPOSITORY, RECTAL RECTAL
Status: DISCONTINUED | OUTPATIENT
Start: 2020-01-28 | End: 2020-02-03 | Stop reason: HOSPADM

## 2020-01-28 RX ORDER — SODIUM CHLORIDE 9 MG/ML
1000 INJECTION, SOLUTION INTRAVENOUS ONCE
Status: COMPLETED | OUTPATIENT
Start: 2020-01-28 | End: 2020-01-29

## 2020-01-28 RX ORDER — DIVALPROEX SODIUM 250 MG/1
250 TABLET, DELAYED RELEASE ORAL 2 TIMES DAILY
Status: DISCONTINUED | OUTPATIENT
Start: 2020-01-29 | End: 2020-02-03 | Stop reason: HOSPADM

## 2020-01-28 RX ORDER — ACETAMINOPHEN 325 MG/1
650 TABLET ORAL EVERY 6 HOURS PRN
Status: DISCONTINUED | OUTPATIENT
Start: 2020-01-28 | End: 2020-02-03 | Stop reason: HOSPADM

## 2020-01-28 RX ORDER — ONDANSETRON 2 MG/ML
4 INJECTION INTRAMUSCULAR; INTRAVENOUS EVERY 4 HOURS PRN
Status: DISCONTINUED | OUTPATIENT
Start: 2020-01-28 | End: 2020-02-03 | Stop reason: HOSPADM

## 2020-01-28 RX ORDER — ONDANSETRON 4 MG/1
4 TABLET, ORALLY DISINTEGRATING ORAL EVERY 4 HOURS PRN
Status: DISCONTINUED | OUTPATIENT
Start: 2020-01-28 | End: 2020-02-03 | Stop reason: HOSPADM

## 2020-01-28 RX ORDER — ASPIRIN 81 MG/1
81 TABLET, CHEWABLE ORAL EVERY MORNING
Status: DISCONTINUED | OUTPATIENT
Start: 2020-01-29 | End: 2020-02-03 | Stop reason: HOSPADM

## 2020-01-28 RX ORDER — OMEPRAZOLE 20 MG/1
20 CAPSULE, DELAYED RELEASE ORAL DAILY
Status: DISCONTINUED | OUTPATIENT
Start: 2020-01-29 | End: 2020-01-28

## 2020-01-28 RX ORDER — DIPHENHYDRAMINE HYDROCHLORIDE 50 MG/ML
25 INJECTION INTRAMUSCULAR; INTRAVENOUS EVERY 6 HOURS PRN
Status: DISCONTINUED | OUTPATIENT
Start: 2020-01-28 | End: 2020-01-28

## 2020-01-28 RX ORDER — BUSPIRONE HYDROCHLORIDE 5 MG/1
5 TABLET ORAL 2 TIMES DAILY
Status: DISCONTINUED | OUTPATIENT
Start: 2020-01-28 | End: 2020-01-28

## 2020-01-28 RX ORDER — KETOROLAC TROMETHAMINE 30 MG/ML
30 INJECTION, SOLUTION INTRAMUSCULAR; INTRAVENOUS EVERY 6 HOURS PRN
Status: DISCONTINUED | OUTPATIENT
Start: 2020-01-28 | End: 2020-01-29

## 2020-01-28 RX ADMIN — LEVETIRACETAM 500 MG: 500 TABLET ORAL at 23:51

## 2020-01-28 RX ADMIN — ONDANSETRON 4 MG: 2 INJECTION INTRAMUSCULAR; INTRAVENOUS at 20:28

## 2020-01-28 RX ADMIN — IOHEXOL 60 ML: 350 INJECTION, SOLUTION INTRAVENOUS at 20:17

## 2020-01-28 RX ADMIN — SODIUM CHLORIDE 1000 ML: 9 INJECTION, SOLUTION INTRAVENOUS at 22:07

## 2020-01-28 RX ADMIN — GABAPENTIN 600 MG: 300 CAPSULE ORAL at 23:51

## 2020-01-28 RX ADMIN — KETAMINE HYDROCHLORIDE 25 MG: 10 INJECTION, SOLUTION INTRAMUSCULAR; INTRAVENOUS at 22:36

## 2020-01-28 RX ADMIN — MORPHINE SULFATE 4 MG: 4 INJECTION INTRAVENOUS at 20:29

## 2020-01-29 PROBLEM — G43.109 COMPLICATED MIGRAINE: Status: ACTIVE | Noted: 2020-01-29

## 2020-01-29 LAB
ALBUMIN SERPL BCP-MCNC: 4 G/DL (ref 3.2–4.9)
ALBUMIN/GLOB SERPL: 1.5 G/DL
ALP SERPL-CCNC: 92 U/L (ref 30–99)
ALT SERPL-CCNC: 20 U/L (ref 2–50)
ANION GAP SERPL CALC-SCNC: 6 MMOL/L (ref 0–11.9)
AST SERPL-CCNC: 16 U/L (ref 12–45)
BASOPHILS # BLD AUTO: 0.9 % (ref 0–1.8)
BASOPHILS # BLD: 0.05 K/UL (ref 0–0.12)
BILIRUB SERPL-MCNC: 0.3 MG/DL (ref 0.1–1.5)
BUN SERPL-MCNC: 5 MG/DL (ref 8–22)
CALCIUM SERPL-MCNC: 9.2 MG/DL (ref 8.5–10.5)
CHLORIDE SERPL-SCNC: 110 MMOL/L (ref 96–112)
CO2 SERPL-SCNC: 25 MMOL/L (ref 20–33)
CREAT SERPL-MCNC: 0.65 MG/DL (ref 0.5–1.4)
CRP SERPL HS-MCNC: 0.03 MG/DL (ref 0–0.75)
EOSINOPHIL # BLD AUTO: 0.01 K/UL (ref 0–0.51)
EOSINOPHIL NFR BLD: 0.2 % (ref 0–6.9)
ERYTHROCYTE [DISTWIDTH] IN BLOOD BY AUTOMATED COUNT: 49.2 FL (ref 35.9–50)
ERYTHROCYTE [SEDIMENTATION RATE] IN BLOOD BY WESTERGREN METHOD: 4 MM/HOUR (ref 0–20)
GLOBULIN SER CALC-MCNC: 2.6 G/DL (ref 1.9–3.5)
GLUCOSE SERPL-MCNC: 103 MG/DL (ref 65–99)
HCT VFR BLD AUTO: 36.9 % (ref 37–47)
HGB BLD-MCNC: 12 G/DL (ref 12–16)
IMM GRANULOCYTES # BLD AUTO: 0.01 K/UL (ref 0–0.11)
IMM GRANULOCYTES NFR BLD AUTO: 0.2 % (ref 0–0.9)
LYMPHOCYTES # BLD AUTO: 2.17 K/UL (ref 1–4.8)
LYMPHOCYTES NFR BLD: 40.3 % (ref 22–41)
MCH RBC QN AUTO: 29 PG (ref 27–33)
MCHC RBC AUTO-ENTMCNC: 32.5 G/DL (ref 33.6–35)
MCV RBC AUTO: 89.1 FL (ref 81.4–97.8)
MONOCYTES # BLD AUTO: 0.36 K/UL (ref 0–0.85)
MONOCYTES NFR BLD AUTO: 6.7 % (ref 0–13.4)
NEUTROPHILS # BLD AUTO: 2.78 K/UL (ref 2–7.15)
NEUTROPHILS NFR BLD: 51.7 % (ref 44–72)
NRBC # BLD AUTO: 0 K/UL
NRBC BLD-RTO: 0 /100 WBC
PLATELET # BLD AUTO: 317 K/UL (ref 164–446)
PMV BLD AUTO: 9.6 FL (ref 9–12.9)
POTASSIUM SERPL-SCNC: 4.1 MMOL/L (ref 3.6–5.5)
PROT SERPL-MCNC: 6.6 G/DL (ref 6–8.2)
RBC # BLD AUTO: 4.14 M/UL (ref 4.2–5.4)
SODIUM SERPL-SCNC: 141 MMOL/L (ref 135–145)
WBC # BLD AUTO: 5.4 K/UL (ref 4.8–10.8)

## 2020-01-29 PROCEDURE — G0378 HOSPITAL OBSERVATION PER HR: HCPCS

## 2020-01-29 PROCEDURE — 85652 RBC SED RATE AUTOMATED: CPT

## 2020-01-29 PROCEDURE — 80053 COMPREHEN METABOLIC PANEL: CPT

## 2020-01-29 PROCEDURE — 85025 COMPLETE CBC W/AUTO DIFF WBC: CPT

## 2020-01-29 PROCEDURE — 700105 HCHG RX REV CODE 258: Performed by: HOSPITALIST

## 2020-01-29 PROCEDURE — 700111 HCHG RX REV CODE 636 W/ 250 OVERRIDE (IP): Performed by: HOSPITALIST

## 2020-01-29 PROCEDURE — 96375 TX/PRO/DX INJ NEW DRUG ADDON: CPT

## 2020-01-29 PROCEDURE — 96376 TX/PRO/DX INJ SAME DRUG ADON: CPT

## 2020-01-29 PROCEDURE — 99226 PR SUBSEQUENT OBSERVATION CARE,LEVEL III: CPT | Performed by: INTERNAL MEDICINE

## 2020-01-29 PROCEDURE — A9270 NON-COVERED ITEM OR SERVICE: HCPCS | Performed by: HOSPITALIST

## 2020-01-29 PROCEDURE — 700111 HCHG RX REV CODE 636 W/ 250 OVERRIDE (IP): Performed by: INTERNAL MEDICINE

## 2020-01-29 PROCEDURE — 86140 C-REACTIVE PROTEIN: CPT

## 2020-01-29 PROCEDURE — 700102 HCHG RX REV CODE 250 W/ 637 OVERRIDE(OP): Performed by: HOSPITALIST

## 2020-01-29 RX ORDER — KETOROLAC TROMETHAMINE 30 MG/ML
30 INJECTION, SOLUTION INTRAMUSCULAR; INTRAVENOUS
Status: DISCONTINUED | OUTPATIENT
Start: 2020-01-29 | End: 2020-02-01

## 2020-01-29 RX ORDER — DIPHENHYDRAMINE HYDROCHLORIDE 50 MG/ML
50 INJECTION INTRAMUSCULAR; INTRAVENOUS EVERY 6 HOURS PRN
Status: DISCONTINUED | OUTPATIENT
Start: 2020-01-28 | End: 2020-01-29

## 2020-01-29 RX ORDER — METOCLOPRAMIDE HYDROCHLORIDE 5 MG/ML
10 INJECTION INTRAMUSCULAR; INTRAVENOUS
Status: DISCONTINUED | OUTPATIENT
Start: 2020-01-29 | End: 2020-02-03 | Stop reason: HOSPADM

## 2020-01-29 RX ORDER — DIPHENHYDRAMINE HYDROCHLORIDE 50 MG/ML
50 INJECTION INTRAMUSCULAR; INTRAVENOUS
Status: DISCONTINUED | OUTPATIENT
Start: 2020-01-29 | End: 2020-02-03 | Stop reason: HOSPADM

## 2020-01-29 RX ORDER — HYDROMORPHONE HYDROCHLORIDE 1 MG/ML
0.5 INJECTION, SOLUTION INTRAMUSCULAR; INTRAVENOUS; SUBCUTANEOUS
Status: DISCONTINUED | OUTPATIENT
Start: 2020-01-29 | End: 2020-01-31

## 2020-01-29 RX ORDER — HYDROMORPHONE HYDROCHLORIDE 1 MG/ML
0.5 INJECTION, SOLUTION INTRAMUSCULAR; INTRAVENOUS; SUBCUTANEOUS ONCE
Status: COMPLETED | OUTPATIENT
Start: 2020-01-29 | End: 2020-01-29

## 2020-01-29 RX ORDER — KETOROLAC TROMETHAMINE 30 MG/ML
30 INJECTION, SOLUTION INTRAMUSCULAR; INTRAVENOUS
Status: COMPLETED | OUTPATIENT
Start: 2020-01-29 | End: 2020-02-01

## 2020-01-29 RX ADMIN — DIVALPROEX SODIUM 250 MG: 500 TABLET, DELAYED RELEASE ORAL at 17:33

## 2020-01-29 RX ADMIN — DIPHENHYDRAMINE HYDROCHLORIDE 50 MG: 50 INJECTION INTRAMUSCULAR; INTRAVENOUS at 17:32

## 2020-01-29 RX ADMIN — SENNOSIDES AND DOCUSATE SODIUM 2 TABLET: 8.6; 5 TABLET ORAL at 17:33

## 2020-01-29 RX ADMIN — SODIUM CHLORIDE, POTASSIUM CHLORIDE, SODIUM LACTATE AND CALCIUM CHLORIDE: 600; 310; 30; 20 INJECTION, SOLUTION INTRAVENOUS at 00:50

## 2020-01-29 RX ADMIN — METOCLOPRAMIDE 10 MG: 5 INJECTION, SOLUTION INTRAMUSCULAR; INTRAVENOUS at 11:19

## 2020-01-29 RX ADMIN — HYDROMORPHONE HYDROCHLORIDE 0.5 MG: 1 INJECTION, SOLUTION INTRAMUSCULAR; INTRAVENOUS; SUBCUTANEOUS at 20:23

## 2020-01-29 RX ADMIN — RISPERIDONE 2 MG: 2 TABLET ORAL at 20:23

## 2020-01-29 RX ADMIN — GABAPENTIN 600 MG: 300 CAPSULE ORAL at 06:09

## 2020-01-29 RX ADMIN — DIPHENHYDRAMINE HYDROCHLORIDE 50 MG: 50 INJECTION INTRAMUSCULAR; INTRAVENOUS at 00:02

## 2020-01-29 RX ADMIN — HYDROMORPHONE HYDROCHLORIDE 0.5 MG: 1 INJECTION, SOLUTION INTRAMUSCULAR; INTRAVENOUS; SUBCUTANEOUS at 12:55

## 2020-01-29 RX ADMIN — SODIUM CHLORIDE, POTASSIUM CHLORIDE, SODIUM LACTATE AND CALCIUM CHLORIDE: 600; 310; 30; 20 INJECTION, SOLUTION INTRAVENOUS at 17:31

## 2020-01-29 RX ADMIN — METOCLOPRAMIDE 10 MG: 5 INJECTION, SOLUTION INTRAMUSCULAR; INTRAVENOUS at 17:32

## 2020-01-29 RX ADMIN — LEVETIRACETAM 500 MG: 500 TABLET ORAL at 17:36

## 2020-01-29 RX ADMIN — HYDROMORPHONE HYDROCHLORIDE 0.5 MG: 1 INJECTION, SOLUTION INTRAMUSCULAR; INTRAVENOUS; SUBCUTANEOUS at 16:12

## 2020-01-29 RX ADMIN — DULOXETINE HYDROCHLORIDE 60 MG: 60 CAPSULE, DELAYED RELEASE ORAL at 00:47

## 2020-01-29 RX ADMIN — KETOROLAC TROMETHAMINE 30 MG: 30 INJECTION, SOLUTION INTRAMUSCULAR; INTRAVENOUS at 11:17

## 2020-01-29 RX ADMIN — HYDROMORPHONE HYDROCHLORIDE 0.5 MG: 1 INJECTION, SOLUTION INTRAMUSCULAR; INTRAVENOUS; SUBCUTANEOUS at 04:36

## 2020-01-29 RX ADMIN — ONDANSETRON 4 MG: 2 INJECTION INTRAMUSCULAR; INTRAVENOUS at 08:20

## 2020-01-29 RX ADMIN — KETOROLAC TROMETHAMINE 30 MG: 30 INJECTION, SOLUTION INTRAMUSCULAR; INTRAVENOUS at 17:33

## 2020-01-29 RX ADMIN — DIVALPROEX SODIUM 500 MG: 500 TABLET, DELAYED RELEASE ORAL at 00:48

## 2020-01-29 RX ADMIN — HYDROMORPHONE HYDROCHLORIDE 0.5 MG: 1 INJECTION, SOLUTION INTRAMUSCULAR; INTRAVENOUS; SUBCUTANEOUS at 01:15

## 2020-01-29 RX ADMIN — DULOXETINE HYDROCHLORIDE 60 MG: 60 CAPSULE, DELAYED RELEASE ORAL at 20:23

## 2020-01-29 RX ADMIN — LEVETIRACETAM 500 MG: 500 TABLET ORAL at 06:10

## 2020-01-29 RX ADMIN — ASPIRIN 81 MG 81 MG: 81 TABLET ORAL at 06:09

## 2020-01-29 RX ADMIN — RISPERIDONE 2 MG: 2 TABLET ORAL at 12:54

## 2020-01-29 RX ADMIN — DULOXETINE HYDROCHLORIDE 60 MG: 60 CAPSULE, DELAYED RELEASE ORAL at 12:53

## 2020-01-29 RX ADMIN — KETOROLAC TROMETHAMINE 30 MG: 30 INJECTION, SOLUTION INTRAMUSCULAR; INTRAVENOUS at 00:05

## 2020-01-29 RX ADMIN — KETOROLAC TROMETHAMINE 30 MG: 30 INJECTION, SOLUTION INTRAMUSCULAR; INTRAVENOUS at 06:13

## 2020-01-29 RX ADMIN — GABAPENTIN 600 MG: 300 CAPSULE ORAL at 20:23

## 2020-01-29 RX ADMIN — HYDROMORPHONE HYDROCHLORIDE 0.5 MG: 1 INJECTION, SOLUTION INTRAMUSCULAR; INTRAVENOUS; SUBCUTANEOUS at 08:22

## 2020-01-29 RX ADMIN — GABAPENTIN 600 MG: 300 CAPSULE ORAL at 17:33

## 2020-01-29 RX ADMIN — RISPERIDONE 2 MG: 2 TABLET ORAL at 00:46

## 2020-01-29 RX ADMIN — DIPHENHYDRAMINE HYDROCHLORIDE 50 MG: 50 INJECTION INTRAMUSCULAR; INTRAVENOUS at 11:14

## 2020-01-29 RX ADMIN — DIVALPROEX SODIUM 500 MG: 500 TABLET, DELAYED RELEASE ORAL at 20:23

## 2020-01-29 RX ADMIN — DIPHENHYDRAMINE HYDROCHLORIDE 50 MG: 50 INJECTION INTRAMUSCULAR; INTRAVENOUS at 06:11

## 2020-01-29 SDOH — ECONOMIC STABILITY: FOOD INSECURITY: WITHIN THE PAST 12 MONTHS, YOU WORRIED THAT YOUR FOOD WOULD RUN OUT BEFORE YOU GOT MONEY TO BUY MORE.: OFTEN TRUE

## 2020-01-29 SDOH — ECONOMIC STABILITY: TRANSPORTATION INSECURITY
IN THE PAST 12 MONTHS, HAS LACK OF TRANSPORTATION KEPT YOU FROM MEETINGS, WORK, OR FROM GETTING THINGS NEEDED FOR DAILY LIVING?: YES

## 2020-01-29 SDOH — ECONOMIC STABILITY: FOOD INSECURITY: WITHIN THE PAST 12 MONTHS, THE FOOD YOU BOUGHT JUST DIDN'T LAST AND YOU DIDN'T HAVE MONEY TO GET MORE.: OFTEN TRUE

## 2020-01-29 SDOH — ECONOMIC STABILITY: TRANSPORTATION INSECURITY
IN THE PAST 12 MONTHS, HAS THE LACK OF TRANSPORTATION KEPT YOU FROM MEDICAL APPOINTMENTS OR FROM GETTING MEDICATIONS?: YES

## 2020-01-29 ASSESSMENT — ENCOUNTER SYMPTOMS
SEIZURES: 0
CHILLS: 0
ABDOMINAL PAIN: 0
PHOTOPHOBIA: 0
NECK PAIN: 0
CARDIOVASCULAR NEGATIVE: 1
HALLUCINATIONS: 0
VOMITING: 0
COUGH: 0
TINGLING: 0
CONSTIPATION: 0
MUSCULOSKELETAL NEGATIVE: 1
NERVOUS/ANXIOUS: 0
LOSS OF CONSCIOUSNESS: 0
FEVER: 0
WEAKNESS: 0
BACK PAIN: 0
WEIGHT LOSS: 0
MYALGIAS: 0
SENSORY CHANGE: 1
DEPRESSION: 0
FLANK PAIN: 0
EYES NEGATIVE: 1
SHORTNESS OF BREATH: 0
WEAKNESS: 1
HEADACHES: 1
FOCAL WEAKNESS: 1
NAUSEA: 1
DIZZINESS: 0
RESPIRATORY NEGATIVE: 1
NAUSEA: 0
BLURRED VISION: 0
GASTROINTESTINAL NEGATIVE: 1
CONSTITUTIONAL NEGATIVE: 1
MEMORY LOSS: 0
SPEECH CHANGE: 1
PHOTOPHOBIA: 1
TREMORS: 0
BRUISES/BLEEDS EASILY: 0
PSYCHIATRIC NEGATIVE: 1
DOUBLE VISION: 0

## 2020-01-29 ASSESSMENT — COGNITIVE AND FUNCTIONAL STATUS - GENERAL
DAILY ACTIVITIY SCORE: 19
WALKING IN HOSPITAL ROOM: A LITTLE
EATING MEALS: A LITTLE
SUGGESTED CMS G CODE MODIFIER MOBILITY: CK
SUGGESTED CMS G CODE MODIFIER DAILY ACTIVITY: CK
CLIMB 3 TO 5 STEPS WITH RAILING: A LOT
DRESSING REGULAR UPPER BODY CLOTHING: A LITTLE
TURNING FROM BACK TO SIDE WHILE IN FLAT BAD: A LITTLE
STANDING UP FROM CHAIR USING ARMS: A LITTLE
DRESSING REGULAR LOWER BODY CLOTHING: A LITTLE
MOVING FROM LYING ON BACK TO SITTING ON SIDE OF FLAT BED: A LITTLE
MOVING TO AND FROM BED TO CHAIR: A LITTLE
TOILETING: A LITTLE
MOBILITY SCORE: 17
HELP NEEDED FOR BATHING: A LITTLE

## 2020-01-29 ASSESSMENT — LIFESTYLE VARIABLES
ALCOHOL_USE: NO
EVER FELT BAD OR GUILTY ABOUT YOUR DRINKING: NO
EVER_SMOKED: NEVER
AVERAGE NUMBER OF DAYS PER WEEK YOU HAVE A DRINK CONTAINING ALCOHOL: 0
SUBSTANCE_ABUSE: 0
ON A TYPICAL DAY WHEN YOU DRINK ALCOHOL HOW MANY DRINKS DO YOU HAVE: 0
TOTAL SCORE: 0
HAVE PEOPLE ANNOYED YOU BY CRITICIZING YOUR DRINKING: NO
HOW MANY TIMES IN THE PAST YEAR HAVE YOU HAD 5 OR MORE DRINKS IN A DAY: 0
EVER HAD A DRINK FIRST THING IN THE MORNING TO STEADY YOUR NERVES TO GET RID OF A HANGOVER: NO
HAVE YOU EVER FELT YOU SHOULD CUT DOWN ON YOUR DRINKING: NO
CONSUMPTION TOTAL: NEGATIVE
TOTAL SCORE: 0
DOES PATIENT WANT TO STOP DRINKING: NO
TOTAL SCORE: 0

## 2020-01-29 ASSESSMENT — PATIENT HEALTH QUESTIONNAIRE - PHQ9
8. MOVING OR SPEAKING SO SLOWLY THAT OTHER PEOPLE COULD HAVE NOTICED. OR THE OPPOSITE, BEING SO FIGETY OR RESTLESS THAT YOU HAVE BEEN MOVING AROUND A LOT MORE THAN USUAL: SEVERAL DAYS
SUM OF ALL RESPONSES TO PHQ9 QUESTIONS 1 AND 2: 2
SUM OF ALL RESPONSES TO PHQ QUESTIONS 1-9: 12
7. TROUBLE CONCENTRATING ON THINGS, SUCH AS READING THE NEWSPAPER OR WATCHING TELEVISION: MORE THAN HALF THE DAYS
3. TROUBLE FALLING OR STAYING ASLEEP OR SLEEPING TOO MUCH: MORE THAN HALF THE DAYS
2. FEELING DOWN, DEPRESSED, IRRITABLE, OR HOPELESS: SEVERAL DAYS
1. LITTLE INTEREST OR PLEASURE IN DOING THINGS: SEVERAL DAYS
9. THOUGHTS THAT YOU WOULD BE BETTER OFF DEAD, OR OF HURTING YOURSELF: NOT AT ALL
6. FEELING BAD ABOUT YOURSELF - OR THAT YOU ARE A FAILURE OR HAVE LET YOURSELF OR YOUR FAMILY DOWN: NOT AL ALL
5. POOR APPETITE OR OVEREATING: MORE THAN HALF THE DAYS
4. FEELING TIRED OR HAVING LITTLE ENERGY: NEARLY EVERY DAY

## 2020-01-29 ASSESSMENT — COPD QUESTIONNAIRES
IN THE PAST 12 MONTHS DO YOU DO LESS THAN YOU USED TO BECAUSE OF YOUR BREATHING PROBLEMS: DISAGREE/UNSURE
HAVE YOU SMOKED AT LEAST 100 CIGARETTES IN YOUR ENTIRE LIFE: NO/DON'T KNOW
COPD SCREENING SCORE: 0
DO YOU EVER COUGH UP ANY MUCUS OR PHLEGM?: NO/ONLY WITH OCCASIONAL COLDS OR INFECTIONS
DURING THE PAST 4 WEEKS HOW MUCH DID YOU FEEL SHORT OF BREATH: NONE/LITTLE OF THE TIME

## 2020-01-29 NOTE — PROGRESS NOTES
Hospital Medicine Daily Progress Note    Date of Service  1/29/2020    Chief Complaint  49 y.o. female admitted 1/28/2020 with migraine.    Hospital Course    PMH migraines, seizure, stroke, hydrocephalus status post lumbar shunt who presented with 1 week of headache not improved by her home medications.  She then developed facial numbness and right arm and leg weakness.  CT head and CTA head and neck were unremarkable, intraventricular shunt was present with stable appearing volume.  Patient unable to have MRI because of spinal stimulator.  She has a history of autoimmune cerebritis, of note ESR and CRP were normal.  She was admitted for complicated migraine.  She has had multiple hospitalizations for uncontrolled migraine pain.      Interval Problem Update  She continues to have headache, photophobia, nausea.  She has not been able to eat much.  She says is been about a month since she had to be hospitalized for a migraine.  She still goes to see her pain management doctor.    Consultants/Specialty  None    Code Status  Full    Disposition  Pending symptoms    Review of Systems  Review of Systems   Constitutional: Positive for malaise/fatigue.   HENT: Negative for congestion.    Eyes: Positive for photophobia.   Respiratory: Negative for cough and shortness of breath.    Cardiovascular: Negative for chest pain.   Gastrointestinal: Positive for nausea. Negative for abdominal pain, constipation and vomiting.   Genitourinary: Negative for dysuria.   Musculoskeletal: Negative for neck pain.   Skin: Negative for itching.   Neurological: Positive for weakness and headaches.        Physical Exam  Temp:  [36 °C (96.8 °F)] 36 °C (96.8 °F)  Pulse:  [] 94  Resp:  [10-18] 16  BP: ()/(48-98) 113/72  SpO2:  [91 %-100 %] 97 %    Physical Exam  Vitals signs and nursing note reviewed.   Constitutional:       Appearance: She is not toxic-appearing or diaphoretic.      Comments: Appears fatigued and uncomfortable   HENT:       Head: Normocephalic.      Mouth/Throat:      Mouth: Mucous membranes are moist.   Eyes:      General:         Right eye: No discharge.         Left eye: No discharge.      Extraocular Movements: Extraocular movements intact.      Pupils: Pupils are equal, round, and reactive to light.   Cardiovascular:      Rate and Rhythm: Normal rate and regular rhythm.   Pulmonary:      Effort: Pulmonary effort is normal.      Breath sounds: No wheezing or rales.   Abdominal:      General: Abdomen is flat.      Palpations: Abdomen is soft.      Tenderness: There is no tenderness.   Skin:     General: Skin is warm and dry.      Coloration: Skin is pale.   Neurological:      Mental Status: She is alert and oriented to person, place, and time.      Comments: 4/5 strength in upper and lower extremities         Fluids    Intake/Output Summary (Last 24 hours) at 1/29/2020 1135  Last data filed at 1/29/2020 0003  Gross per 24 hour   Intake 1050 ml   Output --   Net 1050 ml       Laboratory  Recent Labs     01/28/20 2005 01/29/20  0328   WBC 5.3 5.4   RBC 4.61 4.14*   HEMOGLOBIN 13.6 12.0   HEMATOCRIT 42.3 36.9*   MCV 91.8 89.1   MCH 29.5 29.0   MCHC 32.2* 32.5*   RDW 49.9 49.2   PLATELETCT 358 317   MPV 9.6 9.6     Recent Labs     01/28/20 2005 01/29/20  0328   SODIUM 135 141   POTASSIUM 4.3 4.1   CHLORIDE 107 110   CO2 16* 25   GLUCOSE 77 103*   BUN 3* 5*   CREATININE 0.66 0.65   CALCIUM 9.6 9.2     Recent Labs     01/28/20 2005   APTT 29.0   INR 0.97               Imaging  DX-CHEST-PORTABLE (1 VIEW)   Final Result      No evidence of acute cardiopulmonary process.      CT-CTA NECK WITH & W/O-POST PROCESSING   Final Result      Patent carotid and vertebral arteries bilaterally.      CT-CTA HEAD WITH & W/O-POST PROCESS   Final Result      No evidence of intracranial vascular occlusion or aneurysm.      CT-HEAD W/O   Final Result      1.  No evidence of acute intracranial process.      2.  Right frontal intraventricular catheter  present. Stable ventricular volume.           Assessment/Plan  * Complicated migraine- (present on admission)  Assessment & Plan  Brain imaging shows intact intraventricular shunt and volume  Low suspicion for stroke, this consistent with her recurrent migraine requiring hospitalization  She was given dose of ketamine without improvement  Continue supportive care with Benadryl, Toradol, opioid, IV fluids, antiemetics, rest    Substance dependence (HCC)- (present on admission)  Assessment & Plan  Per chart.    Seizures (HCC)- (present on admission)  Assessment & Plan  Continue keppra, neurontin, depakote.  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

## 2020-01-29 NOTE — ED NOTES
Pt states that her headache is worse. Pt requesting diphenhydramine. No order found. Dr. Martinez paged and notified. Orders provided.

## 2020-01-29 NOTE — ED NOTES
Stroke Pre Alert Time: 1930  Vitals PTA: N/A  FSBS PTA: N/A  Deficits PTA: right sided weakness with numbness and tingling to her face  Door Time: 1930  On set or Last Seen Normal: 1200 approximately  A&O on Arrival: 4  GCS: 15  FSBS on Arrival: 84  On arrival deficits: same    Has had a migraine for eight days, has a history of such. Today around 1200 she began experiencing numbness and tingling to the right side of her face with increasing right sided weakness. History of stroke with residual right sided weakness though today is much worse per patient

## 2020-01-29 NOTE — ED NOTES
LE: Report received from Cece FIGUEROA pt care assumed. Pt awaiting Neuro     Pt reports increased pain 9/10 and she states weakness to R side. Pt neuro reassessed. She has been medicated for pain,  remains at bedside. Will continue to monitor pt and await for room

## 2020-01-29 NOTE — ED TRIAGE NOTES
"Enedelia Pang  49 y.o. female  Chief Complaint   Patient presents with   • Possible Stroke     x8 days, \"numbness and tingling r. hand, r.elbow, and r. side of face.\" Onset 1200 today. GCS 15. Stroke IR called from triage room.        Pt amb to triage with steady gait for above complaint. Speech clear.  Pt is alert and oriented, speaking in full sentences, follows commands and responds appropriately to questions. Resp are even and unlabored. No behavioral indicators of pain.     Report to August FIGUEROA for stroke IR protocol.   "

## 2020-01-29 NOTE — ED NOTES
Med rec updated and complete. Allergies reviewed.  Met with pt at bedside. Dicussed current medications.  No antibiotic use in last 14 days.    Home pharmacy [facility].

## 2020-01-29 NOTE — ED PROVIDER NOTES
"ED Provider Note    CHIEF COMPLAINT  Chief Complaint   Patient presents with   • Possible Stroke     x8 days, \"numbness and tingling r. hand, r.elbow, and r. side of face.\" Onset 1200 today. GCS 15. Stroke IR called from triage room.        HPI  Enedelia Pnag is a 49 y.o. female with a history of migraine headaches, hydrocephalus, status post lumbar shunt, CVA with residual right-sided weakness, multiple TIAs, who presents complaining of a migraine headache for the past 8 days.  The patient says she is not been able to get rid of her headache during this time despite using her usual medications.  Today at about noon, she developed numbness to the face, along with increased numbness and weakness to the right arm and right leg.  She notes this not improved so EMS called and she was brought to the emergency department.  She notes that the weakness in her arm and leg is much worse than previous.  She has had nausea, but no vomiting.  She has not been ill with any fever, chills, sore throat, cough, congestion, or difficulty breathing.    REVIEW OF SYSTEMS  See HPI for further details. All other systems are negative.     PAST MEDICAL HISTORY  Past Medical History:   Diagnosis Date   • Allergy, unspecified not elsewhere classified    • Anemia 10/05/2017    Unsure if a current issue.   • Anesthesia     Migrane after anesthesia   • Anxiety    • Anxiety, generalized    • autoimmune    • Autoimmune disorder (HCC)     Unknown etiology or type   • Clostridium difficile diarrhea 2014    follow up tests negative   • Depression    • Elevated liver enzymes 2017    Related to meds.   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    • Pituitary abnormality (HCC) 2002   • Requires supplemental oxygen     to treat migraines. 2-5L/NC   • Seizure (HCC) started 2017    Unknown etiology-not sure if related to autoimmune issue. Last seizure 2/2019   • Staph infection    • Stroke ([facility]) 2007     with right side residual weakness       FAMILY HISTORY  Family History   Problem Relation Age of Onset   • No Known Problems Mother    • No Known Problems Father        SOCIAL HISTORY  Social History     Socioeconomic History   • Marital status:      Spouse name: Not on file   • Number of children: Not on file   • Years of education: Not on file   • Highest education level: Not on file   Occupational History   • Occupation:      Comment: on disability   Social Needs   • Financial resource strain: Not on file   • Food insecurity:     Worry: Not on file     Inability: Not on file   • Transportation needs:     Medical: Not on file     Non-medical: Not on file   Tobacco Use   • Smoking status: Never Smoker   • Smokeless tobacco: Never Used   Substance and Sexual Activity   • Alcohol use: Not Currently     Frequency: Never     Binge frequency: Never   • Drug use: Never   • Sexual activity: Not on file   Lifestyle   • Physical activity:     Days per week: Not on file     Minutes per session: Not on file   • Stress: Not on file   Relationships   • Social connections:     Talks on phone: Not on file     Gets together: Not on file     Attends Christianity service: Not on file     Active member of club or organization: Not on file     Attends meetings of clubs or organizations: Not on file     Relationship status: Not on file   • Intimate partner violence:     Fear of current or ex partner:  Not on file     Emotionally abused: Not on file     Physically abused: Not on file     Forced sexual activity: Not on file   Other Topics Concern   • Not on file   Social History Narrative   • Not on file       SURGICAL HISTORY  Past Surgical History:   Procedure Laterality Date   • PB IMPLANT NEUROSTIM/  2019    Procedure: INSERTION, NEUROSTIMULATOR, PERMANENT, SPINAL CORD;  Surgeon: Seven Mahoney M.D.;  Location: SURGERY [facility];  Service: Pain Management   • SPINAL CORD STIMULATOR  2018    Explanted   • FULL THICKNESS SKIN GRAFT Left 2018     graft to foot (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   • OTHER SURGICAL PROCEDURE  11/10/2017    Power port   • LIVER BIOPSY  2017   •  SHUNT INSERTION  2017    Procedure:  SHUNT INSERTION;  Surgeon: Drew Berry M.D.;  Location: SURGERY [facility];  Service:    • SPINAL CORD STIMULATOR  2016    Procedure: SPINAL CORD STIMULATOR FOR: PLACEMENT OF LEFT FLANK OCCIPITAL NERVE STIMULATOR BATTERY PACK;  Surgeon: Oli Oh III, M.D.;  Location: SURGERY [facility];  Service:    • LUMBAR EXPLORATION N/A 2015    Procedure: LUMBAR EXPLORATION- Lumbar shunt removal ;  Surgeon: Oli Oh III, M.D.;  Location: SURGERY [facility];  Service:    • OTHER NEUROLOGICAL SURG  2015    Explanted lumbar shunt and explanted power port due to MRSA   • IRRIGATION & DEBRIDEMENT NEURO N/A 2015    Procedure: IRRIGATION & DEBRIDEMENT NEURO;  Surgeon: Oli Oh III, M.D.;  Location: SURGERY [facility];  Service:    • SPINAL CORD STIMULATOR      1st implant   • CHOLECYSTECTOMY      had ruptured day before   • ENDOMETRIAL ABLATION     • TUBAL COAGULATION LAPAROSCOPIC BILATERAL Bilateral    • KNEE ARTHROSCOPY Right    • TONSILLECTOMY     • APPENDECTOMY     • OTHER NEUROLOGICAL SURG  [id number]    occipital nerve stimulator-revisions for total of 9  "procedures       CURRENT MEDICATIONS  Home Medications     Reviewed by Matthew Bruno (Pharmacy Tech) on 01/28/20 at 2240  Med List Status: Complete    Medication Last Dose Status    aspirin (ASA) 81 MG Chew Tab chewable tablet 1/28/2020 Active    divalproex (DEPAKOTE) 250 MG Tablet Delayed Response 1/28/2020 Active    divalproex (DEPAKOTE) 500 MG Tablet Delayed Response 1/27/2020 Active    duloxetine (CYMBALTA) 60 MG CPEP delayed-release capsule 1/28/2020 Active    EMGALITY 120 MG/ML Solution Auto-injector 1/2/2020 Active    gabapentin (NEURONTIN) 600 MG tablet 1/28/2020 Active    levETIRAcetam (KEPPRA) 500 MG Tab 1/28/2020 Active    risperiDONE (RISPERDAL) 2 MG Tab 1/28/2020 Active                ALLERGIES  Allergies   Allergen Reactions   • Sumatriptan Anaphylaxis     Historical=\"Heart stops.\" Reaction  between 1995 to 1997   • Prochlorperazine Swelling     Tongue swelling. Reaction as a teen. (compazine)  Tolerated Phenergan on 02/24/15   • Vancomycin Shortness of Breath and Rash     Reaction in 2005.  D/W patient 8/31/15 - tolerated loading dose of vancomycin administered on 8/30/15 with some itching to chest with decreased infusion rate. Red Man Syndrome.  2018-tolerated slow drip with benadryl pre-med-had no problems.       PHYSICAL EXAM  VITAL SIGNS: Blood Pressure 144/98   Pulse (Abnormal) 111   Temperature 36 °C (96.8 °F) (Temporal)   Respiration (Abnormal) 11   Height 1.6 m (5' 3\")   Weight 73 kg (160 lb 15 oz)   Oxygen Saturation 100%   Body Mass Index 28.51 kg/m²   Constitutional: Awake, alert, in no acute distress, Non-toxic appearance.   HENT: Atraumatic. Bilateral external ears normal, mucous membranes dry, throat nonerythematous without exudates, nose is normal.  Eyes: PERRL, EOMI, conjunctiva moist, noninjected.  No nystagmus or photophobia.  Neck: Nontender, Normal range of motion, No nuchal rigidity, No stridor.   Lymphatic: No lymphadenopathy noted.   Cardiovascular: Regular rate and " rhythm, no murmurs, rubs, gallops.  Thorax & Lungs:  Good breath sounds bilaterally, no wheezes, rales, or retractions.  No chest tenderness.  Abdomen: Bowel sounds normal, Soft, nontender, nondistended, no rebound, guarding, masses.  Back: No CVA or spinal tenderness.  Extremities: Intact distal pulses, No edema, No tenderness.   Skin: Warm, Dry, No rashes.   Musculoskeletal: No joint swelling or tenderness.  Neurologic: Alert & oriented x 3, cranial nerves II through XII appear intact, speech is fluent, no facial asymmetry, subjective numbness noted to the right face, sensory so some mild numbness to the right upper and right lower extremities versus the left, and motor function shows 5/5 strength to the left upper and left lower extremities, 4/5 strength to the right upper and right lower extremities, mild pronator drift on the right, alternating finger movements are slowed on the right, no focal deficits.   Psychiatric: Affect normal, Judgment normal, Mood normal.     EKG  Twelve-lead EKG shows a sinus tachycardia, rate of 102, normal intervals, left axis deviation, no acute ST wave elevations or ST depressions, no pathologic Q waves, no evidence of an acute injury or ischemic pattern on my reading, in comparison to previous EKG from December 2019 there are no acute changes.      RADIOLOGY/PROCEDURES  DX-CHEST-PORTABLE (1 VIEW)   Final Result      No evidence of acute cardiopulmonary process.      CT-CTA NECK WITH & W/O-POST PROCESSING   Final Result      Patent carotid and vertebral arteries bilaterally.      CT-CTA HEAD WITH & W/O-POST PROCESS   Final Result      No evidence of intracranial vascular occlusion or aneurysm.      CT-HEAD W/O   Final Result      1.  No evidence of acute intracranial process.      2.  Right frontal intraventricular catheter present. Stable ventricular volume.            COURSE & MEDICAL DECISION MAKING  Pertinent Labs & Imaging studies reviewed. (See chart for details)  The patient  presents with the above complaints.  Protocol was initiated.  Given her worsening neurologic symptoms, CVA cannot be ruled out.  This may just be a migraine headache with neurologic symptoms.  IV was placed, she was given a bolus of normal saline.  She was given dose of morphine Zofran for pain.  CT angiogram of the head and neck were obtained which shows no acute abnormalities.  A right frontal intraventricular catheter was present with stable ventricular volume.    EKG shows a sinus tachycardia.  CBC shows a white count 5300, hemoglobin 13.6, normal differential, Chem-8 shows a CO2 of 16, and gap 12, glucose 77, normal renal function and liver function test, troponin 6, INR 0.97.  At this time there does not appear to be any acute thrombosis requiring intervention.  The patient is outside the window for TPA.  Clinically she appears dehydrated with dry mucous membranes.  Lab work appears consistent with this with a low CO2, increased anion gap.  She is given a liter bolus of normal saline.  On reexamination, patient symptoms are unchanged.  She reported only mild relief of her headache.  She said ketamine had worked in the past.  She is given 0.3 mg/kg of ketamine.  Given the possibility of a stroke, patient will be admitted for further observation.  Case was discussed with Dr. Martinez.  Patient does have a spinal pump in place so will likely be unable to get an MRI.    FINAL IMPRESSION  1.  Migraine headache  2.  Increased right-sided weakness  3.  Paresthesias to the right face and extremities.         Electronically signed by: Oscar Urias M.D., 1/28/2020 11:09 PM

## 2020-01-29 NOTE — H&P
Hospital Medicine History & Physical Note    Date of Service  1/28/2020    Primary Care Physician  Elliott Power M.D.    Consultants  None.    Code Status  Full code.    Chief Complaint  Headache.    History of Presenting Illness  49 y.o. female who presented 1/28/2020 with headache, right sided weakness. She has a complex history of migraines, strokes, cerebritis and TIAs. Multiple times she has had right sided weakness with headache. Today she has severe headache, left eye droop and right extremity weakness. The pain did not respond to ketamine which often works. CT and CTA of the head were negative; MRI cannot be done as she has a non MRI compatible spinal stimulator for chronic back pain.    Review of Systems  Review of Systems   Constitutional: Negative.  Negative for chills, fever, malaise/fatigue and weight loss.   HENT: Negative.  Negative for hearing loss and tinnitus.    Eyes: Negative.  Negative for blurred vision, double vision and photophobia.   Respiratory: Negative.  Negative for cough and shortness of breath.    Cardiovascular: Negative.  Negative for chest pain and leg swelling.   Gastrointestinal: Negative.  Negative for abdominal pain, nausea and vomiting.   Genitourinary: Negative.  Negative for dysuria and flank pain.   Musculoskeletal: Negative.  Negative for back pain, myalgias and neck pain.   Neurological: Positive for sensory change, speech change, focal weakness and headaches. Negative for dizziness, tingling, tremors, seizures, loss of consciousness and weakness.   Endo/Heme/Allergies: Negative.  Does not bruise/bleed easily.   Psychiatric/Behavioral: Negative.  Negative for depression, hallucinations, memory loss and substance abuse. The patient is not nervous/anxious.    All other systems reviewed and are negative.      Past Medical History   has a past medical history of Allergy, unspecified not elsewhere classified, Anemia (10/05/2017), Anesthesia, Anxiety, Anxiety, generalized,  "autoimmune, Autoimmune disorder ([facility]), Clostridium difficile diarrhea (2014), Depression, Elevated liver enzymes (2017), H/O Clostridium difficile infection (2014), MRSA infection (2003), Hydrocephalus ([facility]) (2015), Joint pain (09/10/2019), Migraine with aura, with intractable migraine, so stated, without mention of status migrainosus, MRSA (methicillin resistant Staphylococcus aureus) (08/2015), MRSA (methicillin resistant Staphylococcus aureus) (12/2017), MRSA (methicillin resistant Staphylococcus aureus) (2018), Pituitary abnormality ([facility]) (2002), Requires supplemental oxygen, Seizure ([facility]) (started 2017), Staph infection, and Stroke ([facility]) (2007). She also has no past medical history of Addisons disease ([facility]).    Surgical History   has a past surgical history that includes tonsillectomy (1985); other neurological surg (8050-5930); irrigation & debridement neuro (N/A, 6/28/2015); lumbar exploration (N/A, 8/31/2015); spinal cord stimulator (12/19/2016);  shunt insertion (5/31/2017); liver biopsy (07/24/2017); cholecystectomy (2001); appendectomy (1982); spinal cord stimulator (05/24/2018); spinal cord stimulator (2003); knee arthroscopy (Right, 1990); full thickness skin graft (Left, 04/2018); other surgical procedure (11/10/2017); other neurological surg (08/2015); endometrial ablation (2001); tubal coagulation laparoscopic bilateral (Bilateral, 2001); and pr implant neurostim/ (9/13/2019).     Family History  family history includes No Known Problems in her father and mother.     Social History   reports that she has never smoked. She has never used smokeless tobacco. She reports previous alcohol use. She reports that she does not use drugs.    Allergies  Allergies   Allergen Reactions   • Sumatriptan Anaphylaxis     Historical=\"Heart stops.\" Reaction  between 1995 to 1997   • Prochlorperazine Swelling     Tongue swelling. Reaction as a teen. (compazine)  Tolerated Phenergan on 02/24/15   • Vancomycin " Shortness of Breath and Rash     Reaction in .  D/W patient 8/31/15 - tolerated loading dose of vancomycin administered on 8/30/15 with some itching to chest with decreased infusion rate. Red Man Syndrome.  2018-tolerated slow drip with benadryl pre-med-had no problems.       Medications  Prior to Admission Medications   Prescriptions Last Dose Informant Patient Reported? Taking?   EMGALITY 120 MG/ML Solution Auto-injector  Patient Yes No   Sig: Inject 120 mg as instructed Q30 DAYS.   aspirin (ASA) 81 MG Chew Tab chewable tablet  Patient Yes No   Sig: Take 81 mg by mouth every morning.   busPIRone (BUSPAR) 5 MG tablet  Patient Yes No   Sig: Take 5 mg by mouth 2 times a day.   diphenhydrAMINE (BENADRYL) 50 MG/ML Solution  Patient Yes No   Si mg by Intramuscular route every 6 hours as needed (Migraines). Indications: migraine   divalproex (DEPAKOTE) 250 MG Tablet Delayed Response  Patient No No   Sig: Take 1 Tab by mouth every 12 hours.   Patient taking differently: Take 250 mg by mouth 2 times a day, with meals.   divalproex (DEPAKOTE) 500 MG Tablet Delayed Response  Patient No No   Sig: Take 1 Tab by mouth every bedtime.   duloxetine (CYMBALTA) 60 MG CPEP delayed-release capsule  Patient Yes No   Sig: Take 60 mg by mouth 2 times a day.   gabapentin (NEURONTIN) 600 MG tablet  Patient Yes No   Sig: Take 600 mg by mouth 3 times a day.   levETIRAcetam (KEPPRA) 500 MG Tab  Patient Yes No   Sig: Take 1,500 mg by mouth 3 times a day.   omeprazole (PRILOSEC) 20 MG delayed-release capsule  Patient Yes No   Sig: Take 20 mg by mouth every day.   risperiDONE (RISPERDAL) 2 MG Tab  Patient Yes No   Sig: Take 2 mg by mouth 2 times a day.      Facility-Administered Medications: None       Physical Exam  Pulse:  [105-108] 107  Resp:  [10-18] 10  BP: (112-136)/(72-80) 112/80  SpO2:  [91 %-94 %] 91 %    Physical Exam  Vitals signs and nursing note reviewed.   Constitutional:       General: She is not in acute distress.      Appearance: She is well-developed. She is not diaphoretic.   HENT:      Right Ear: External ear normal.      Left Ear: External ear normal.      Nose: Nose normal.      Mouth/Throat:      Pharynx: No oropharyngeal exudate.   Eyes:      General: No scleral icterus.        Right eye: No discharge.         Left eye: No discharge.      Conjunctiva/sclera: Conjunctivae normal.   Neck:      Vascular: No JVD.      Trachea: No tracheal deviation.   Cardiovascular:      Rate and Rhythm: Normal rate and regular rhythm.      Heart sounds: Normal heart sounds.   Pulmonary:      Effort: Pulmonary effort is normal. No respiratory distress.      Breath sounds: Normal breath sounds. No stridor. No wheezing or rales.   Chest:      Chest wall: No tenderness.   Abdominal:      General: Bowel sounds are normal. There is no distension.      Palpations: Abdomen is soft.      Tenderness: There is no tenderness.   Musculoskeletal:         General: No tenderness.   Skin:     General: Skin is warm and dry.      Coloration: Skin is not pale.   Neurological:      Mental Status: She is alert.      Cranial Nerves: No cranial nerve deficit.      Motor: No abnormal muscle tone.   Psychiatric:         Behavior: Behavior normal.         Laboratory:  Recent Labs     01/28/20 2005   WBC 5.3   RBC 4.61   HEMOGLOBIN 13.6   HEMATOCRIT 42.3   MCV 91.8   MCH 29.5   MCHC 32.2*   RDW 49.9   PLATELETCT 358   MPV 9.6     Recent Labs     01/28/20 2005   SODIUM 135   POTASSIUM 4.3   CHLORIDE 107   CO2 16*   GLUCOSE 77   BUN 3*   CREATININE 0.66   CALCIUM 9.6     Recent Labs     01/28/20 2005   ALTSGPT 24   ASTSGOT 21   ALKPHOSPHAT 104*   TBILIRUBIN 0.2   GLUCOSE 77     Recent Labs     01/28/20 2005   APTT 29.0   INR 0.97     No results for input(s): NTPROBNP in the last 72 hours.      Recent Labs     01/28/20 2005   TROPONINT 6       Urinalysis:    No results found     Imaging:  DX-CHEST-PORTABLE (1 VIEW)   Final Result      No evidence of acute  cardiopulmonary process.      CT-CTA NECK WITH & W/O-POST PROCESSING   Final Result      Patent carotid and vertebral arteries bilaterally.      CT-CTA HEAD WITH & W/O-POST PROCESS   Final Result      No evidence of intracranial vascular occlusion or aneurysm.      CT-HEAD W/O   Final Result      1.  No evidence of acute intracranial process.      2.  Right frontal intraventricular catheter present. Stable ventricular volume.            Assessment/Plan:  I anticipate this patient is appropriate for observation status at this time.    * Complicated migraine- (present on admission)  Assessment & Plan  She does have prior history of CVA, admit for monitoring, can't have MRI due to spinal stimulator.  Toradol, benadryl and ketamine ordered, still with pain. Dilaudid given with improvement.  Neuro checks  Continue aspirin, depakote and cymbalta.    Substance dependence (HCC)- (present on admission)  Assessment & Plan  Per chart.    Seizures (HCC)- (present on admission)  Assessment & Plan  Continue keppra, neurontin, depakote.    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

## 2020-01-29 NOTE — ED NOTES
Pt to room now. Pt appears to be in no acute distress, A&O x4 and answering all questions appropriately.

## 2020-01-29 NOTE — ED NOTES
Pt requesting Dr. Martinez to come and re-evaluate her. Pt states that the pain is back in her head. Pt laying on stretcher in no apparent distress. Page sent out to Dr. Martinez.

## 2020-01-29 NOTE — ASSESSMENT & PLAN NOTE
Brain imaging shows intact intraventricular shunt and volume  Low suspicion for stroke, this consistent with her recurrent migraine requiring hospitalization  Continue supportive care with Benadryl, Toradol, opioid, IV fluids, antiemetics, rest. She has been slow to improve.  - will repeat ketamine and ordered for IV depakote today

## 2020-01-29 NOTE — ED NOTES
Pt given ice pack for comfort. Pt resting on stretcher with lights turned off to decrease environmental stimuli. Pt informed that RN is awaiting medication from pharmacy.

## 2020-01-29 NOTE — ASSESSMENT & PLAN NOTE
I discussed with the patient that she will not be prescribed opioids on discharge, they are meant to break her headache so she can return home, she understands.

## 2020-01-29 NOTE — ED NOTES
Dr. Martinez called back. As per Dr. Martinez, she is unable to see pt at this time since she is at the [facility]. RN will continue to monitor the pt.

## 2020-01-29 NOTE — ED NOTES
Dr. Martinez notified of pt's symptoms. No further orders given at this time. Pt laying in hospital bed in no acute distress.

## 2020-01-30 LAB
ALBUMIN SERPL BCP-MCNC: 3.7 G/DL (ref 3.2–4.9)
ALBUMIN/GLOB SERPL: 1.5 G/DL
ALP SERPL-CCNC: 82 U/L (ref 30–99)
ALT SERPL-CCNC: 13 U/L (ref 2–50)
ANION GAP SERPL CALC-SCNC: 8 MMOL/L (ref 0–11.9)
AST SERPL-CCNC: 15 U/L (ref 12–45)
BILIRUB SERPL-MCNC: 0.4 MG/DL (ref 0.1–1.5)
BUN SERPL-MCNC: 10 MG/DL (ref 8–22)
CALCIUM SERPL-MCNC: 9 MG/DL (ref 8.5–10.5)
CHLORIDE SERPL-SCNC: 101 MMOL/L (ref 96–112)
CO2 SERPL-SCNC: 26 MMOL/L (ref 20–33)
CREAT SERPL-MCNC: 0.53 MG/DL (ref 0.5–1.4)
GLOBULIN SER CALC-MCNC: 2.4 G/DL (ref 1.9–3.5)
GLUCOSE SERPL-MCNC: 87 MG/DL (ref 65–99)
MAGNESIUM SERPL-MCNC: 1.8 MG/DL (ref 1.5–2.5)
PHOSPHATE SERPL-MCNC: 5 MG/DL (ref 2.5–4.5)
POTASSIUM SERPL-SCNC: 4.1 MMOL/L (ref 3.6–5.5)
PROT SERPL-MCNC: 6.1 G/DL (ref 6–8.2)
SODIUM SERPL-SCNC: 135 MMOL/L (ref 135–145)

## 2020-01-30 PROCEDURE — 700111 HCHG RX REV CODE 636 W/ 250 OVERRIDE (IP): Performed by: HOSPITALIST

## 2020-01-30 PROCEDURE — 99233 SBSQ HOSP IP/OBS HIGH 50: CPT | Performed by: INTERNAL MEDICINE

## 2020-01-30 PROCEDURE — 36415 COLL VENOUS BLD VENIPUNCTURE: CPT

## 2020-01-30 PROCEDURE — 80053 COMPREHEN METABOLIC PANEL: CPT

## 2020-01-30 PROCEDURE — A9270 NON-COVERED ITEM OR SERVICE: HCPCS | Performed by: INTERNAL MEDICINE

## 2020-01-30 PROCEDURE — 700102 HCHG RX REV CODE 250 W/ 637 OVERRIDE(OP): Performed by: HOSPITALIST

## 2020-01-30 PROCEDURE — A9270 NON-COVERED ITEM OR SERVICE: HCPCS | Performed by: HOSPITALIST

## 2020-01-30 PROCEDURE — G0378 HOSPITAL OBSERVATION PER HR: HCPCS

## 2020-01-30 PROCEDURE — 83735 ASSAY OF MAGNESIUM: CPT

## 2020-01-30 PROCEDURE — 700102 HCHG RX REV CODE 250 W/ 637 OVERRIDE(OP): Performed by: INTERNAL MEDICINE

## 2020-01-30 PROCEDURE — 700101 HCHG RX REV CODE 250: Performed by: INTERNAL MEDICINE

## 2020-01-30 PROCEDURE — 84100 ASSAY OF PHOSPHORUS: CPT

## 2020-01-30 PROCEDURE — 96376 TX/PRO/DX INJ SAME DRUG ADON: CPT

## 2020-01-30 PROCEDURE — 302083 SET,INFUSION NEEDLE 20 X 3/4": Performed by: INTERNAL MEDICINE

## 2020-01-30 PROCEDURE — 700105 HCHG RX REV CODE 258: Performed by: HOSPITALIST

## 2020-01-30 PROCEDURE — 700111 HCHG RX REV CODE 636 W/ 250 OVERRIDE (IP): Performed by: INTERNAL MEDICINE

## 2020-01-30 PROCEDURE — 770006 HCHG ROOM/CARE - MED/SURG/GYN SEMI*

## 2020-01-30 RX ORDER — OXYCODONE HYDROCHLORIDE 5 MG/1
5-10 TABLET ORAL EVERY 4 HOURS PRN
Status: DISCONTINUED | OUTPATIENT
Start: 2020-01-30 | End: 2020-01-31

## 2020-01-30 RX ADMIN — METOCLOPRAMIDE 10 MG: 5 INJECTION, SOLUTION INTRAMUSCULAR; INTRAVENOUS at 12:40

## 2020-01-30 RX ADMIN — METOCLOPRAMIDE 10 MG: 5 INJECTION, SOLUTION INTRAMUSCULAR; INTRAVENOUS at 08:33

## 2020-01-30 RX ADMIN — GABAPENTIN 600 MG: 300 CAPSULE ORAL at 04:08

## 2020-01-30 RX ADMIN — DULOXETINE HYDROCHLORIDE 60 MG: 60 CAPSULE, DELAYED RELEASE ORAL at 21:31

## 2020-01-30 RX ADMIN — DIVALPROEX SODIUM 250 MG: 500 TABLET, DELAYED RELEASE ORAL at 04:08

## 2020-01-30 RX ADMIN — DULOXETINE HYDROCHLORIDE 60 MG: 60 CAPSULE, DELAYED RELEASE ORAL at 08:34

## 2020-01-30 RX ADMIN — LEVETIRACETAM 500 MG: 500 TABLET ORAL at 04:08

## 2020-01-30 RX ADMIN — HYDROMORPHONE HYDROCHLORIDE 0.5 MG: 1 INJECTION, SOLUTION INTRAMUSCULAR; INTRAVENOUS; SUBCUTANEOUS at 19:16

## 2020-01-30 RX ADMIN — DIVALPROEX SODIUM 250 MG: 500 TABLET, DELAYED RELEASE ORAL at 17:45

## 2020-01-30 RX ADMIN — SODIUM CHLORIDE, POTASSIUM CHLORIDE, SODIUM LACTATE AND CALCIUM CHLORIDE: 600; 310; 30; 20 INJECTION, SOLUTION INTRAVENOUS at 21:37

## 2020-01-30 RX ADMIN — HYDROMORPHONE HYDROCHLORIDE 0.5 MG: 1 INJECTION, SOLUTION INTRAMUSCULAR; INTRAVENOUS; SUBCUTANEOUS at 14:20

## 2020-01-30 RX ADMIN — DIPHENHYDRAMINE HYDROCHLORIDE 50 MG: 50 INJECTION INTRAMUSCULAR; INTRAVENOUS at 02:29

## 2020-01-30 RX ADMIN — KETOROLAC TROMETHAMINE 30 MG: 30 INJECTION, SOLUTION INTRAMUSCULAR; INTRAVENOUS at 08:33

## 2020-01-30 RX ADMIN — DIPHENHYDRAMINE HYDROCHLORIDE 50 MG: 50 INJECTION INTRAMUSCULAR; INTRAVENOUS at 12:40

## 2020-01-30 RX ADMIN — DIPHENHYDRAMINE HYDROCHLORIDE 50 MG: 50 INJECTION INTRAMUSCULAR; INTRAVENOUS at 08:33

## 2020-01-30 RX ADMIN — HYDROMORPHONE HYDROCHLORIDE 0.5 MG: 1 INJECTION, SOLUTION INTRAMUSCULAR; INTRAVENOUS; SUBCUTANEOUS at 04:09

## 2020-01-30 RX ADMIN — OXYCODONE HYDROCHLORIDE 5 MG: 5 TABLET ORAL at 21:32

## 2020-01-30 RX ADMIN — RISPERIDONE 2 MG: 2 TABLET ORAL at 08:34

## 2020-01-30 RX ADMIN — METOCLOPRAMIDE 10 MG: 5 INJECTION, SOLUTION INTRAMUSCULAR; INTRAVENOUS at 17:45

## 2020-01-30 RX ADMIN — KETOROLAC TROMETHAMINE 30 MG: 30 INJECTION, SOLUTION INTRAMUSCULAR; INTRAVENOUS at 17:45

## 2020-01-30 RX ADMIN — HYDROMORPHONE HYDROCHLORIDE 0.5 MG: 1 INJECTION, SOLUTION INTRAMUSCULAR; INTRAVENOUS; SUBCUTANEOUS at 07:55

## 2020-01-30 RX ADMIN — RISPERIDONE 2 MG: 2 TABLET ORAL at 21:31

## 2020-01-30 RX ADMIN — KETAMINE HYDROCHLORIDE 25 MG: 10 INJECTION, SOLUTION INTRAMUSCULAR; INTRAVENOUS at 12:15

## 2020-01-30 RX ADMIN — DIVALPROEX SODIUM 500 MG: 500 TABLET, DELAYED RELEASE ORAL at 21:31

## 2020-01-30 RX ADMIN — KETOROLAC TROMETHAMINE 30 MG: 30 INJECTION, SOLUTION INTRAMUSCULAR; INTRAVENOUS at 12:40

## 2020-01-30 RX ADMIN — SENNOSIDES AND DOCUSATE SODIUM 2 TABLET: 8.6; 5 TABLET ORAL at 17:46

## 2020-01-30 RX ADMIN — DIPHENHYDRAMINE HYDROCHLORIDE 50 MG: 50 INJECTION INTRAMUSCULAR; INTRAVENOUS at 17:45

## 2020-01-30 RX ADMIN — LEVETIRACETAM 500 MG: 500 TABLET ORAL at 17:45

## 2020-01-30 RX ADMIN — HYDROMORPHONE HYDROCHLORIDE 0.5 MG: 1 INJECTION, SOLUTION INTRAMUSCULAR; INTRAVENOUS; SUBCUTANEOUS at 23:13

## 2020-01-30 RX ADMIN — ASPIRIN 81 MG 81 MG: 81 TABLET ORAL at 04:09

## 2020-01-30 RX ADMIN — GABAPENTIN 600 MG: 300 CAPSULE ORAL at 21:31

## 2020-01-30 RX ADMIN — KETOROLAC TROMETHAMINE 30 MG: 30 INJECTION, SOLUTION INTRAMUSCULAR; INTRAVENOUS at 02:29

## 2020-01-30 RX ADMIN — GABAPENTIN 600 MG: 300 CAPSULE ORAL at 12:40

## 2020-01-30 RX ADMIN — HYDROMORPHONE HYDROCHLORIDE 0.5 MG: 1 INJECTION, SOLUTION INTRAMUSCULAR; INTRAVENOUS; SUBCUTANEOUS at 00:01

## 2020-01-30 RX ADMIN — SENNOSIDES AND DOCUSATE SODIUM 2 TABLET: 8.6; 5 TABLET ORAL at 04:08

## 2020-01-30 ASSESSMENT — ENCOUNTER SYMPTOMS
SHORTNESS OF BREATH: 0
COUGH: 0
ABDOMINAL PAIN: 0
CONSTIPATION: 0
NECK PAIN: 0
VOMITING: 0
NAUSEA: 1
PHOTOPHOBIA: 1
HEADACHES: 1

## 2020-01-30 NOTE — PROGRESS NOTES
[facility] Medicine Daily Progress Note    Date of Service  1/30/2020    Chief Complaint  49 y.o. female admitted 1/28/2020 with migraine.    Hospital Course    PMH migraines, seizure, stroke, hydrocephalus status post lumbar shunt who presented with 1 week of headache not improved by her home medications.  She then developed facial numbness and right arm and leg weakness.  CT head and CTA head and neck were unremarkable, intraventricular shunt was present with stable appearing volume.  Patient unable to have MRI because of spinal stimulator.  She has a history of autoimmune cerebritis, of note ESR and CRP were normal.  She was admitted for complicated migraine.  She has had multiple hospitalizations for uncontrolled migraine pain.      Interval Problem Update  Her symptoms are about the same.  She has temporarily relief before headache returns.  Her nausea is better and she is able to eat some.    Consultants/Specialty  None    Code Status  Full    Disposition  Pending symptom control    Review of Systems  Review of Systems   Constitutional: Positive for malaise/fatigue.   HENT: Negative for congestion.    Eyes: Positive for photophobia.   Respiratory: Negative for cough and shortness of breath.    Cardiovascular: Negative for chest pain.   Gastrointestinal: Positive for nausea (Improved). Negative for abdominal pain, constipation and vomiting.   Genitourinary: Negative for dysuria.   Musculoskeletal: Negative for neck pain.   Skin: Negative for itching.   Neurological: Positive for headaches.        Physical Exam  Temp:  [36.1 °C (96.9 °F)-37 °C (98.6 °F)] 36.1 °C (97 °F)  Pulse:  [] 74  Resp:  [14-21] 17  BP: (100-126)/(45-77) 113/45  SpO2:  [91 %-100 %] 100 %    Physical Exam  Vitals signs and nursing note reviewed.   Constitutional:       Appearance: She is not toxic-appearing or diaphoretic.      Comments: Appears fatigued and uncomfortable   HENT:      Head: Normocephalic.      Mouth/Throat:      Mouth:  Mucous membranes are moist.   Eyes:      General:         Right eye: No discharge.         Left eye: No discharge.      Extraocular Movements: Extraocular movements intact.      Pupils: Pupils are equal, round, and reactive to light.      Comments: No nystagmus   Cardiovascular:      Rate and Rhythm: Normal rate and regular rhythm.   Pulmonary:      Effort: Pulmonary effort is normal.      Breath sounds: No wheezing or rales.   Abdominal:      Palpations: Abdomen is soft.      Tenderness: There is no tenderness.   Skin:     General: Skin is warm and dry.      Coloration: Skin is pale.   Neurological:      Mental Status: She is alert and oriented to person, place, and time.      Comments: 4/5 strength in upper and lower extremities but poor effort  No facial droop, normal speech         Fluids    Intake/Output Summary (Last 24 hours) at 1/30/2020 1117  Last data filed at 1/29/2020 1149  Gross per 24 hour   Intake 1000 ml   Output --   Net 1000 ml       Laboratory  Recent Labs     01/28/20 2005 01/29/20  0328   WBC 5.3 5.4   RBC 4.61 4.14*   HEMOGLOBIN 13.6 12.0   HEMATOCRIT 42.3 36.9*   MCV 91.8 89.1   MCH 29.5 29.0   MCHC 32.2* 32.5*   RDW 49.9 49.2   PLATELETCT 358 317   MPV 9.6 9.6     Recent Labs     01/28/20 2005 01/29/20  0328 01/30/20  0553   SODIUM 135 141 135   POTASSIUM 4.3 4.1 4.1   CHLORIDE 107 110 101   CO2 16* 25 26   GLUCOSE 77 103* 87   BUN 3* 5* 10   CREATININE 0.66 0.65 0.53   CALCIUM 9.6 9.2 9.0     Recent Labs     01/28/20 2005   APTT 29.0   INR 0.97               Imaging  DX-CHEST-PORTABLE (1 VIEW)   Final Result      No evidence of acute cardiopulmonary process.      CT-CTA NECK WITH & W/O-POST PROCESSING   Final Result      Patent carotid and vertebral arteries bilaterally.      CT-CTA HEAD WITH & W/O-POST PROCESS   Final Result      No evidence of intracranial vascular occlusion or aneurysm.      CT-HEAD W/O   Final Result      1.  No evidence of acute intracranial process.      2.  Right  frontal intraventricular catheter present. Stable ventricular volume.           Assessment/Plan  * Complicated migraine- (present on admission)  Assessment & Plan  Brain imaging shows intact intraventricular shunt and volume  Low suspicion for stroke, this consistent with her recurrent migraine requiring hospitalization  Retry ketamine today  Continue supportive care with Benadryl, Toradol, opioid, IV fluids, antiemetics, rest    Substance dependence (HCC)- (present on admission)  Assessment & Plan  I discussed with the patient that she will not be prescribed opioids on discharge, they are meant to break her headache so she can return home, she understands.    Seizures (HCC)- (present on admission)  Assessment & Plan  Continue keppra, neurontin, depakote.  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

## 2020-01-30 NOTE — PROGRESS NOTES
2 Rn skin check complete    Pt has scar to left chest from new power port  Sutures to right chest from removed power port  Implanted device to left posterior hip    Generalized bruising    No further skin defects noted

## 2020-01-30 NOTE — CARE PLAN
Problem: Communication  Goal: The ability to communicate needs accurately and effectively will improve  Outcome: PROGRESSING AS EXPECTED     Problem: Safety  Goal: Will remain free from injury  Outcome: PROGRESSING AS EXPECTED  Note:   Fall precautions in place     Problem: Knowledge Deficit  Goal: Knowledge of disease process/condition, treatment plan, diagnostic tests, and medications will improve  Outcome: PROGRESSING AS EXPECTED  Note:   Pt educated on pain control

## 2020-01-30 NOTE — CARE PLAN
Problem: Safety  Goal: Will remain free from falls  Outcome: PROGRESSING AS EXPECTED  Note:   Pt education reinforced on fall precautions, non skid socks applied, call light within reach, bed alarm on, bed in lowest position.      Problem: Pain Management  Goal: Pain level will decrease to patient's comfort goal  Outcome: PROGRESSING SLOWER THAN EXPECTED  Note:   Pt education on non-pharmacological measures for pain management reinforced.

## 2020-01-30 NOTE — PROGRESS NOTES
Pt care assumed approximately 1900  Bedside report from day shift RN  Pt in bed A&Ox4  Fall aspiration precautions in place  Call light and belongings with in reach  Bed locked and in lowest position  Pt expresses no needs/concerns at this time  Hourly rounding provided

## 2020-01-30 NOTE — PROGRESS NOTES
Assumed care at 0700. Pt is A/O x 4. Continent of B&B. Needs standby assist from staff. Has complaints of pain on anterior head bilateral. Diet regular and tolerates well. Hourly rounding in place. Bed alarm in use. Call light within reach. Will continue to monitor.

## 2020-01-30 NOTE — CARE PLAN
Problem: Safety  Goal: Will remain free from falls  Outcome: PROGRESSING AS EXPECTED  Note:   Pt educated on use of call light, bed in lowest position with wheels locked, non skid socks applied.     Problem: Pain Management  Goal: Pain level will decrease to patient's comfort goal  Outcome: PROGRESSING AS EXPECTED  Intervention: Educate and implement non-pharmacologic comfort measures. Examples: relaxation, distration, play therapy, activity therapy, massage, etc.  Note:   Pt educated on utilizing dimmed lights, meditation, cold packs to help alleviate pain.

## 2020-01-30 NOTE — PROGRESS NOTES
Pt transfer received from ER by transport. Report received from Elida. Pt stable, oriented to room and call light system. Pt immediately requested pain medication. Pt has 5 sutures on right chest from power port removal and a scar on left chest from new port insertion.

## 2020-01-31 LAB
AMMONIA PLAS-SCNC: 54 UMOL/L (ref 11–45)
VALPROATE SERPL-MCNC: 47.4 UG/ML (ref 50–100)

## 2020-01-31 PROCEDURE — 700102 HCHG RX REV CODE 250 W/ 637 OVERRIDE(OP): Performed by: HOSPITALIST

## 2020-01-31 PROCEDURE — 700111 HCHG RX REV CODE 636 W/ 250 OVERRIDE (IP): Performed by: HOSPITALIST

## 2020-01-31 PROCEDURE — A9270 NON-COVERED ITEM OR SERVICE: HCPCS | Performed by: INTERNAL MEDICINE

## 2020-01-31 PROCEDURE — 700102 HCHG RX REV CODE 250 W/ 637 OVERRIDE(OP): Performed by: INTERNAL MEDICINE

## 2020-01-31 PROCEDURE — 80164 ASSAY DIPROPYLACETIC ACD TOT: CPT

## 2020-01-31 PROCEDURE — 82140 ASSAY OF AMMONIA: CPT

## 2020-01-31 PROCEDURE — 770006 HCHG ROOM/CARE - MED/SURG/GYN SEMI*

## 2020-01-31 PROCEDURE — A9270 NON-COVERED ITEM OR SERVICE: HCPCS | Performed by: HOSPITALIST

## 2020-01-31 PROCEDURE — 700105 HCHG RX REV CODE 258: Performed by: HOSPITALIST

## 2020-01-31 PROCEDURE — 700111 HCHG RX REV CODE 636 W/ 250 OVERRIDE (IP): Performed by: INTERNAL MEDICINE

## 2020-01-31 PROCEDURE — 99233 SBSQ HOSP IP/OBS HIGH 50: CPT | Performed by: INTERNAL MEDICINE

## 2020-01-31 RX ORDER — MAGNESIUM SULFATE HEPTAHYDRATE 40 MG/ML
2 INJECTION, SOLUTION INTRAVENOUS ONCE
Status: COMPLETED | OUTPATIENT
Start: 2020-01-31 | End: 2020-01-31

## 2020-01-31 RX ORDER — OXYCODONE HYDROCHLORIDE 10 MG/1
10-15 TABLET ORAL EVERY 4 HOURS PRN
Status: DISCONTINUED | OUTPATIENT
Start: 2020-01-31 | End: 2020-02-03 | Stop reason: HOSPADM

## 2020-01-31 RX ORDER — MEPERIDINE HYDROCHLORIDE 50 MG/ML
25 INJECTION INTRAMUSCULAR; INTRAVENOUS; SUBCUTANEOUS EVERY 4 HOURS PRN
Status: COMPLETED | OUTPATIENT
Start: 2020-01-31 | End: 2020-02-01

## 2020-01-31 RX ADMIN — OXYCODONE HYDROCHLORIDE 10 MG: 5 TABLET ORAL at 11:41

## 2020-01-31 RX ADMIN — DIPHENHYDRAMINE HYDROCHLORIDE 50 MG: 50 INJECTION INTRAMUSCULAR; INTRAVENOUS at 09:10

## 2020-01-31 RX ADMIN — GABAPENTIN 600 MG: 300 CAPSULE ORAL at 14:03

## 2020-01-31 RX ADMIN — DIVALPROEX SODIUM 250 MG: 500 TABLET, DELAYED RELEASE ORAL at 04:57

## 2020-01-31 RX ADMIN — METOCLOPRAMIDE 10 MG: 5 INJECTION, SOLUTION INTRAMUSCULAR; INTRAVENOUS at 17:58

## 2020-01-31 RX ADMIN — DIVALPROEX SODIUM 250 MG: 500 TABLET, DELAYED RELEASE ORAL at 17:58

## 2020-01-31 RX ADMIN — RISPERIDONE 2 MG: 2 TABLET ORAL at 20:08

## 2020-01-31 RX ADMIN — KETOROLAC TROMETHAMINE 30 MG: 30 INJECTION, SOLUTION INTRAMUSCULAR; INTRAVENOUS at 01:32

## 2020-01-31 RX ADMIN — ASPIRIN 81 MG 81 MG: 81 TABLET ORAL at 04:58

## 2020-01-31 RX ADMIN — DULOXETINE HYDROCHLORIDE 60 MG: 60 CAPSULE, DELAYED RELEASE ORAL at 09:18

## 2020-01-31 RX ADMIN — DIVALPROEX SODIUM 500 MG: 500 TABLET, DELAYED RELEASE ORAL at 20:08

## 2020-01-31 RX ADMIN — KETOROLAC TROMETHAMINE 30 MG: 30 INJECTION, SOLUTION INTRAMUSCULAR; INTRAVENOUS at 12:17

## 2020-01-31 RX ADMIN — MEPERIDINE HYDROCHLORIDE 25 MG: 50 INJECTION INTRAMUSCULAR; INTRAVENOUS; SUBCUTANEOUS at 20:08

## 2020-01-31 RX ADMIN — GABAPENTIN 600 MG: 300 CAPSULE ORAL at 04:58

## 2020-01-31 RX ADMIN — LEVETIRACETAM 500 MG: 500 TABLET ORAL at 04:58

## 2020-01-31 RX ADMIN — MEPERIDINE HYDROCHLORIDE 25 MG: 50 INJECTION INTRAMUSCULAR; INTRAVENOUS; SUBCUTANEOUS at 15:50

## 2020-01-31 RX ADMIN — DIPHENHYDRAMINE HYDROCHLORIDE 50 MG: 50 INJECTION INTRAMUSCULAR; INTRAVENOUS at 01:36

## 2020-01-31 RX ADMIN — LEVETIRACETAM 500 MG: 500 TABLET ORAL at 18:16

## 2020-01-31 RX ADMIN — DIPHENHYDRAMINE HYDROCHLORIDE 50 MG: 50 INJECTION INTRAMUSCULAR; INTRAVENOUS at 12:17

## 2020-01-31 RX ADMIN — GABAPENTIN 600 MG: 300 CAPSULE ORAL at 20:08

## 2020-01-31 RX ADMIN — MAGNESIUM SULFATE 2 G: 2 INJECTION INTRAVENOUS at 09:12

## 2020-01-31 RX ADMIN — KETOROLAC TROMETHAMINE 30 MG: 30 INJECTION, SOLUTION INTRAMUSCULAR; INTRAVENOUS at 17:58

## 2020-01-31 RX ADMIN — METOCLOPRAMIDE 10 MG: 5 INJECTION, SOLUTION INTRAMUSCULAR; INTRAVENOUS at 12:17

## 2020-01-31 RX ADMIN — KETOROLAC TROMETHAMINE 30 MG: 30 INJECTION, SOLUTION INTRAMUSCULAR; INTRAVENOUS at 09:10

## 2020-01-31 RX ADMIN — HYDROMORPHONE HYDROCHLORIDE 0.5 MG: 1 INJECTION, SOLUTION INTRAMUSCULAR; INTRAVENOUS; SUBCUTANEOUS at 02:55

## 2020-01-31 RX ADMIN — RISPERIDONE 2 MG: 2 TABLET ORAL at 09:18

## 2020-01-31 RX ADMIN — OXYCODONE HYDROCHLORIDE 10 MG: 5 TABLET ORAL at 04:58

## 2020-01-31 RX ADMIN — SODIUM CHLORIDE, POTASSIUM CHLORIDE, SODIUM LACTATE AND CALCIUM CHLORIDE: 600; 310; 30; 20 INJECTION, SOLUTION INTRAVENOUS at 09:19

## 2020-01-31 RX ADMIN — DIPHENHYDRAMINE HYDROCHLORIDE 50 MG: 50 INJECTION INTRAMUSCULAR; INTRAVENOUS at 17:58

## 2020-01-31 RX ADMIN — HYDROMORPHONE HYDROCHLORIDE 0.5 MG: 1 INJECTION, SOLUTION INTRAMUSCULAR; INTRAVENOUS; SUBCUTANEOUS at 14:03

## 2020-01-31 RX ADMIN — DULOXETINE HYDROCHLORIDE 60 MG: 60 CAPSULE, DELAYED RELEASE ORAL at 20:08

## 2020-01-31 RX ADMIN — METOCLOPRAMIDE 10 MG: 5 INJECTION, SOLUTION INTRAMUSCULAR; INTRAVENOUS at 09:10

## 2020-01-31 RX ADMIN — HYDROMORPHONE HYDROCHLORIDE 0.5 MG: 1 INJECTION, SOLUTION INTRAMUSCULAR; INTRAVENOUS; SUBCUTANEOUS at 07:19

## 2020-01-31 RX ADMIN — SENNOSIDES AND DOCUSATE SODIUM 2 TABLET: 8.6; 5 TABLET ORAL at 04:57

## 2020-01-31 RX ADMIN — HYDROMORPHONE HYDROCHLORIDE 0.5 MG: 1 INJECTION, SOLUTION INTRAMUSCULAR; INTRAVENOUS; SUBCUTANEOUS at 10:43

## 2020-01-31 RX ADMIN — ONDANSETRON 4 MG: 2 INJECTION INTRAMUSCULAR; INTRAVENOUS at 01:34

## 2020-01-31 ASSESSMENT — ENCOUNTER SYMPTOMS
CONSTIPATION: 0
SHORTNESS OF BREATH: 0
ABDOMINAL PAIN: 0
NECK PAIN: 0
NAUSEA: 1
VOMITING: 0
HEADACHES: 1
PHOTOPHOBIA: 1
COUGH: 0

## 2020-01-31 NOTE — DIETARY
Nutrition Services: Day 1 of admit. Enedelia Pang is a 49 y.o. female with admitting DX of Atypical migraine.  Consult received for 2-13 lb weight loss x 1 month, poor appetite.     Pt reports unintentionally losing 4.5 kg (10 lb) x 1 month. UBW is 72.7 kg (160 lb) per pt verbalization and she last recalls being this weight 6 months ago. Although, pt stated her weight fluctuates from size 2 to size 12. For the past 6 months, pt reports consuming 50% of usual PO intake. Appetite is good at this time per pt verbalization. Pt reports consuming all of her meals since admit.      Assessment:  Ht: 160 cm, Wt 1/28: 73 kg (160 lb 15 oz) via stand up scale, BMI: 29.29 - overweight  Diet/Intake: regular - Per chart pt PO <% x 3 meals     Evaluation:   1. Pt appears well-nourished.   2. 6% weight loss x 1 month (severe).   3. Labs: ammonia 54   4. Meds: reglan, zofran, pericolace  5. Fluids: LR @ 75 ml/hr  6. Last BM: 1/30    Malnutrition Risk: Pt with severe chronic illness related malnutrition secondary to poor PO intake as evidenced by severe weight loss and consuming 50% of usual PO intake x 6 months per pt verbalization.    Recommendations/Plan:   1. Encourage intake of meals.  2. Document intake of all meals as % taken in ADL's to provide interdisciplinary communication across all shifts.   3. Monitor weight.  4. Nutrition rep will continue to see patient for ongoing meal and snack preferences.  5. Obtain supplement order per RD as needed.    RD following.

## 2020-01-31 NOTE — CARE PLAN
Problem: Communication  Goal: The ability to communicate needs accurately and effectively will improve  Outcome: PROGRESSING AS EXPECTED     Problem: Safety  Goal: Will remain free from injury  Outcome: PROGRESSING AS EXPECTED     Problem: Infection  Goal: Will remain free from infection  Outcome: PROGRESSING AS EXPECTED     Problem: Bowel/Gastric:  Goal: Normal bowel function is maintained or improved  Outcome: PROGRESSING AS EXPECTED     Problem: Knowledge Deficit  Goal: Knowledge of disease process/condition, treatment plan, diagnostic tests, and medications will improve  Outcome: PROGRESSING AS EXPECTED  Note:   Pt educated on pain control     Problem: Discharge Barriers/Planning  Goal: Patient's continuum of care needs will be met  Outcome: PROGRESSING SLOWER THAN EXPECTED  Note:   Pain still not managed

## 2020-01-31 NOTE — ED NOTES
[address] Daily Progress Note      Admit Date:  1/26/2020    Chief Complaint: Chest pain    Subjective:  Ms. Devi Daily denies chest discomfort or shortness of breath. Her biggest complaint is discomfort at the surgical site(s). Objective:   /75   Pulse 70   Temp 97.5 °F (36.4 °C)   Resp 13   Ht 5' 3\" (1.6 m)   Wt 289 lb 14.5 oz (131.5 kg) Comment: 1 pillow, 1 sheet left, without mattres pump.    LMP 11/01/1996 (Exact Date)   SpO2 100%   BMI 51.35 kg/m²       Intake/Output Summary (Last 24 hours) at 1/31/2020 1306  Last data filed at 1/31/2020 0555  Gross per 24 hour   Intake 1738 ml   Output --   Net 1738 ml       TELEMETRY: Sinus     Physical Exam:  General:  Awake, alert, oriented x 3, NAD  Skin:  Warm and dry  Neck:  JVD is difficult to assess  Chest:  decreased air exchange bibasilar  Cardiovascular:  RRR S1S2, no S3, no significant murmur  Abdomen: Nondistended  Extremities: Trace edema    Medications:    insulin glargine  25 Units Subcutaneous Nightly    insulin lispro  5 Units Subcutaneous TID WC    albumin human  25 g Intravenous Once    sodium chloride flush  10 mL Intravenous 2 times per day    pantoprazole  40 mg Oral QAM AC    piperacillin-tazobactam  3.375 g Intravenous Q12H    nebivolol  2.5 mg Oral 2 times per day on Mon Wed Fri    nebivolol  5 mg Oral 2 times per day on Sun Tue Thu Sat    sevelamer  1.6 g Oral TID WC    budesonide  500 mcg Nebulization BID    apixaban  2.5 mg Oral BID    escitalopram  10 mg Oral Daily    ferrous sulfate  325 mg Oral TID WC    formoterol  20 mcg Nebulization BID    pregabalin  50 mg Oral TID    insulin lispro  0-12 Units Subcutaneous TID WC    insulin lispro  0-6 Units Subcutaneous Nightly    collagenase   Topical Daily    sodium thiosulfate  25 g Intravenous Q MWF    lidocaine   Topical Once    silver nitrate applicators  10 each Topical Once    sodium chloride flush  10 mL Intravenous 2 times per day      Patient transported to CT scan at this time.   norepinephrine 0.12 mcg/kg/min (01/31/20 1049)    dextrose       sodium chloride flush, ondansetron, HYDROmorphone, calcium carbonate, lip balm petroleum free, ipratropium-albuterol, traZODone, glucose, dextrose, glucagon (rDNA), dextrose, perflutren lipid microspheres, sodium chloride flush, magnesium hydroxide, acetaminophen    Lab Data:  CBC:   Recent Labs     01/29/20  0508 01/30/20  0425 01/31/20 0347   WBC 12.5* 11.5* 15.8*   HGB 9.6* 9.7* 8.5*   HCT 30.1* 30.7* 26.8*   MCV 91.2 91.1 91.0    214 207     BMP:   Recent Labs     01/29/20  0508 01/30/20 0425 01/31/20 0347    139 133*   K 4.6 4.4 5.0   CL 89* 95* 90*   CO2 21 24 22   BUN 32* 20 29*   CREATININE 7.2* 4.9* 6.1*     LIVER PROFILE:   No results for input(s): AST, ALT, LIPASE, BILIDIR, BILITOT, ALKPHOS in the last 72 hours. Invalid input(s): AMYLASE,  ALB  PT/INR:   No results for input(s): PROTIME, INR in the last 72 hours. APTT: No results for input(s): APTT in the last 72 hours. BNP:  No results for input(s): BNP in the last 72 hours. IMAGING:   General surgical procedure 1/30/2020:   Arlice Eis excisional debridement of skin, subcutaneous tissue of proximal right hip wound (13x8cm), distal right hip wound ([id number]), abdominal wound ([id number]), left hip wound ([id number])    Echo 1/28/2020:  Summary   -Very technically difficult study      -Normal left ventricle size, wall thickness, and systolic function with an estimated ejection fraction of 60%. No regional wall motion abnormalities   are seen. Normal diastolic function.   -The right ventricle is mildly enlarged.   -Trivial- mild tricuspid regurgitation. PASP = 27 mmHg, assuming a right atrial pressure of 3 mmHg. -There is an anterior clear space that is more likely pericardial fat pad than effusion.     Limited echo 1/3/2019:   Summary     Limited echo for pericardial effusion.     Small pericardial effusion noted near right ventricle     Normal LV systolic function     Myoview stress test 3/8/2018:  Summary    Normal myocardial perfusion.    Borderline LV function with ejection fraction of 52%.    Low risk scan.      Echo 12/26/2017:  Eboni Gregory   -Very technically difficult exam due to morbid obesity.   -Global left ventricular function is normal with ejection fraction estimated   at 55 %.  -Wall motion assessment was limited due to poor endocardial border definition secondary to large body habitus.   -There is mild tricuspid regurgitation with RVSP estimated at 30 mmHg.   -Mild circumferential pericardial effusion noted. There is no cardiac   evidence of tamponade. Assessment/Plan:  Principal Problem:    Chest pain  Plan: Atypical.  Resolved. Stress test just under 2 years ago was negative for ischemia. She tolerated recent surgery well from an ischemic standpoint. Echo this admission with normal LV systolic function.      Active Problems:    SOB (shortness of breath)  Plan: Etiology unclear. Likely multifactorial.       Elevated troponin  Plan: Multifactorial-hypertensive heart disease, end-stage renal disease.       Chronic diastolic CHF (congestive heart failure) ([address]  Plan: Compensated. Dialysis dependent.       ESRD (end stage renal disease) on dialysis [facility])  Plan: Chronic. Per nephrology.       Chronic abdominal wound infection, sequela  Plan: Concern for infection. Per primary service. Hypotension  Plan: Likely volume related. Preserved LV systolic function on recent echo. No suggestion of ischemia. Wean Levophed as hemodynamics allow. Will sign off. Please call if we can be of further assistance.     Naun Bowens MD 1/31/2020 1:06 PM

## 2020-01-31 NOTE — PROGRESS NOTES
[facility] Medicine Daily Progress Note    Date of Service  1/31/2020    Chief Complaint  49 y.o. female admitted 1/28/2020 with migraine.    Hospital Course    PMH migraines, seizure, stroke, hydrocephalus status post lumbar shunt who presented with 1 week of headache not improved by her home medications.  She then developed facial numbness and right arm and leg weakness.  CT head and CTA head and neck were unremarkable, intraventricular shunt was present with stable appearing volume.  Patient unable to have MRI because of spinal stimulator.  She has a history of autoimmune cerebritis, of note ESR and CRP were normal.  She was admitted for complicated migraine.  She has had multiple hospitalizations for uncontrolled migraine pain.      Interval Problem Update  Her nausea and photophobia are lessened but her headache is still severe    Consultants/Specialty  None    Code Status  Full    Disposition  Pending symptom control    Review of Systems  Review of Systems   Constitutional: Positive for malaise/fatigue.   HENT: Negative for congestion.    Eyes: Positive for photophobia (improved).   Respiratory: Negative for cough and shortness of breath.    Cardiovascular: Negative for chest pain.   Gastrointestinal: Positive for nausea (Improved). Negative for abdominal pain, constipation and vomiting.   Genitourinary: Negative for dysuria.   Musculoskeletal: Negative for neck pain.   Skin: Negative for itching.   Neurological: Positive for headaches.        Physical Exam  Temp:  [36.2 °C (97.2 °F)-36.7 °C (98.1 °F)] 36.7 °C (98.1 °F)  Pulse:  [72-93] 79  Resp:  [16-18] 18  BP: ()/(48-58) 111/56  SpO2:  [97 %-99 %] 97 %    Physical Exam  Vitals signs and nursing note reviewed.   Constitutional:       Appearance: She is not toxic-appearing or diaphoretic.      Comments: Appears fatigued and uncomfortable   HENT:      Head: Normocephalic.      Mouth/Throat:      Mouth: Mucous membranes are moist.   Eyes:      General:          Right eye: No discharge.         Left eye: No discharge.      Extraocular Movements: Extraocular movements intact.      Pupils: Pupils are equal, round, and reactive to light.      Comments: No nystagmus   Cardiovascular:      Rate and Rhythm: Normal rate and regular rhythm.   Pulmonary:      Effort: Pulmonary effort is normal.      Breath sounds: No wheezing or rales.   Abdominal:      Palpations: Abdomen is soft.      Tenderness: There is no tenderness.   Skin:     General: Skin is warm and dry.      Coloration: Skin is pale.   Neurological:      Mental Status: She is alert and oriented to person, place, and time.      Comments: 4/5 strength in upper and lower extremities but poor effort  No facial droop, normal speech  Normal gait down hallway         Fluids  No intake or output data in the 24 hours ending 01/31/20 1449    Laboratory  Recent Labs     01/28/20 2005 01/29/20 0328   WBC 5.3 5.4   RBC 4.61 4.14*   HEMOGLOBIN 13.6 12.0   HEMATOCRIT 42.3 36.9*   MCV 91.8 89.1   MCH 29.5 29.0   MCHC 32.2* 32.5*   RDW 49.9 49.2   PLATELETCT 358 317   MPV 9.6 9.6     Recent Labs     01/28/20 2005 01/29/20 0328 01/30/20  0553   SODIUM 135 141 135   POTASSIUM 4.3 4.1 4.1   CHLORIDE 107 110 101   CO2 16* 25 26   GLUCOSE 77 103* 87   BUN 3* 5* 10   CREATININE 0.66 0.65 0.53   CALCIUM 9.6 9.2 9.0     Recent Labs     01/28/20 2005   APTT 29.0   INR 0.97               Imaging  DX-CHEST-PORTABLE (1 VIEW)   Final Result      No evidence of acute cardiopulmonary process.      CT-CTA NECK WITH & W/O-POST PROCESSING   Final Result      Patent carotid and vertebral arteries bilaterally.      CT-CTA HEAD WITH & W/O-POST PROCESS   Final Result      No evidence of intracranial vascular occlusion or aneurysm.      CT-HEAD W/O   Final Result      1.  No evidence of acute intracranial process.      2.  Right frontal intraventricular catheter present. Stable ventricular volume.           Assessment/Plan  * Complicated migraine-  (present on admission)  Assessment & Plan  Brain imaging shows intact intraventricular shunt and volume  Low suspicion for stroke, this consistent with her recurrent migraine requiring hospitalization  Retry ketamine today  Continue supportive care with Benadryl, Toradol, opioid, IV fluids, antiemetics, rest  She wants to try demerol instead of dilaudid today, has tolerated before, will change  Check depakote level    Substance dependence (HCC)- (present on admission)  Assessment & Plan  I discussed with the patient that she will not be prescribed opioids on discharge, they are meant to break her headache so she can return home, she understands.    Seizures (HCC)- (present on admission)  Assessment & Plan  Continue keppra, neurontin, depakote.  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

## 2020-02-01 PROCEDURE — 700111 HCHG RX REV CODE 636 W/ 250 OVERRIDE (IP): Performed by: HOSPITALIST

## 2020-02-01 PROCEDURE — 700111 HCHG RX REV CODE 636 W/ 250 OVERRIDE (IP): Performed by: INTERNAL MEDICINE

## 2020-02-01 PROCEDURE — 700105 HCHG RX REV CODE 258: Performed by: HOSPITALIST

## 2020-02-01 PROCEDURE — A9270 NON-COVERED ITEM OR SERVICE: HCPCS | Performed by: INTERNAL MEDICINE

## 2020-02-01 PROCEDURE — 770006 HCHG ROOM/CARE - MED/SURG/GYN SEMI*

## 2020-02-01 PROCEDURE — 700102 HCHG RX REV CODE 250 W/ 637 OVERRIDE(OP): Performed by: INTERNAL MEDICINE

## 2020-02-01 PROCEDURE — 99232 SBSQ HOSP IP/OBS MODERATE 35: CPT | Performed by: INTERNAL MEDICINE

## 2020-02-01 PROCEDURE — 700102 HCHG RX REV CODE 250 W/ 637 OVERRIDE(OP): Performed by: HOSPITALIST

## 2020-02-01 PROCEDURE — A9270 NON-COVERED ITEM OR SERVICE: HCPCS | Performed by: HOSPITALIST

## 2020-02-01 RX ORDER — KETOROLAC TROMETHAMINE 30 MG/ML
30 INJECTION, SOLUTION INTRAMUSCULAR; INTRAVENOUS
Status: COMPLETED | OUTPATIENT
Start: 2020-02-01 | End: 2020-02-01

## 2020-02-01 RX ORDER — HYDROMORPHONE HYDROCHLORIDE 1 MG/ML
0.5 INJECTION, SOLUTION INTRAMUSCULAR; INTRAVENOUS; SUBCUTANEOUS EVERY 4 HOURS PRN
Status: DISCONTINUED | OUTPATIENT
Start: 2020-02-01 | End: 2020-02-03 | Stop reason: HOSPADM

## 2020-02-01 RX ADMIN — RISPERIDONE 2 MG: 2 TABLET ORAL at 08:25

## 2020-02-01 RX ADMIN — DIPHENHYDRAMINE HYDROCHLORIDE 50 MG: 50 INJECTION INTRAMUSCULAR; INTRAVENOUS at 12:22

## 2020-02-01 RX ADMIN — SENNOSIDES AND DOCUSATE SODIUM 2 TABLET: 8.6; 5 TABLET ORAL at 17:21

## 2020-02-01 RX ADMIN — KETOROLAC TROMETHAMINE 30 MG: 30 INJECTION, SOLUTION INTRAMUSCULAR; INTRAVENOUS at 08:25

## 2020-02-01 RX ADMIN — ONDANSETRON 4 MG: 2 INJECTION INTRAMUSCULAR; INTRAVENOUS at 02:37

## 2020-02-01 RX ADMIN — DIPHENHYDRAMINE HYDROCHLORIDE 50 MG: 50 INJECTION INTRAMUSCULAR; INTRAVENOUS at 08:25

## 2020-02-01 RX ADMIN — DULOXETINE HYDROCHLORIDE 60 MG: 60 CAPSULE, DELAYED RELEASE ORAL at 08:25

## 2020-02-01 RX ADMIN — MEPERIDINE HYDROCHLORIDE 25 MG: 50 INJECTION INTRAMUSCULAR; INTRAVENOUS; SUBCUTANEOUS at 04:50

## 2020-02-01 RX ADMIN — GABAPENTIN 600 MG: 300 CAPSULE ORAL at 04:49

## 2020-02-01 RX ADMIN — RISPERIDONE 2 MG: 2 TABLET ORAL at 20:00

## 2020-02-01 RX ADMIN — HYDROMORPHONE HYDROCHLORIDE 0.5 MG: 1 INJECTION, SOLUTION INTRAMUSCULAR; INTRAVENOUS; SUBCUTANEOUS at 21:54

## 2020-02-01 RX ADMIN — GABAPENTIN 600 MG: 300 CAPSULE ORAL at 12:22

## 2020-02-01 RX ADMIN — MEPERIDINE HYDROCHLORIDE 25 MG: 50 INJECTION INTRAMUSCULAR; INTRAVENOUS; SUBCUTANEOUS at 09:03

## 2020-02-01 RX ADMIN — DIPHENHYDRAMINE HYDROCHLORIDE 50 MG: 50 INJECTION INTRAMUSCULAR; INTRAVENOUS at 17:22

## 2020-02-01 RX ADMIN — LEVETIRACETAM 500 MG: 500 TABLET ORAL at 17:21

## 2020-02-01 RX ADMIN — HYDROMORPHONE HYDROCHLORIDE 0.5 MG: 1 INJECTION, SOLUTION INTRAMUSCULAR; INTRAVENOUS; SUBCUTANEOUS at 13:16

## 2020-02-01 RX ADMIN — SENNOSIDES AND DOCUSATE SODIUM 2 TABLET: 8.6; 5 TABLET ORAL at 04:49

## 2020-02-01 RX ADMIN — DIVALPROEX SODIUM 250 MG: 500 TABLET, DELAYED RELEASE ORAL at 17:21

## 2020-02-01 RX ADMIN — METOCLOPRAMIDE 10 MG: 5 INJECTION, SOLUTION INTRAMUSCULAR; INTRAVENOUS at 12:22

## 2020-02-01 RX ADMIN — LEVETIRACETAM 500 MG: 500 TABLET ORAL at 04:49

## 2020-02-01 RX ADMIN — OXYCODONE HYDROCHLORIDE 15 MG: 10 TABLET ORAL at 14:25

## 2020-02-01 RX ADMIN — DIVALPROEX SODIUM 250 MG: 500 TABLET, DELAYED RELEASE ORAL at 04:50

## 2020-02-01 RX ADMIN — KETOROLAC TROMETHAMINE 30 MG: 30 INJECTION, SOLUTION INTRAMUSCULAR; INTRAVENOUS at 02:37

## 2020-02-01 RX ADMIN — KETOROLAC TROMETHAMINE 30 MG: 30 INJECTION, SOLUTION INTRAMUSCULAR at 17:22

## 2020-02-01 RX ADMIN — HYDROMORPHONE HYDROCHLORIDE 0.5 MG: 1 INJECTION, SOLUTION INTRAMUSCULAR; INTRAVENOUS; SUBCUTANEOUS at 17:22

## 2020-02-01 RX ADMIN — DIVALPROEX SODIUM 500 MG: 500 TABLET, DELAYED RELEASE ORAL at 21:55

## 2020-02-01 RX ADMIN — SODIUM CHLORIDE, POTASSIUM CHLORIDE, SODIUM LACTATE AND CALCIUM CHLORIDE: 600; 310; 30; 20 INJECTION, SOLUTION INTRAVENOUS at 02:36

## 2020-02-01 RX ADMIN — ASPIRIN 81 MG 81 MG: 81 TABLET ORAL at 04:49

## 2020-02-01 RX ADMIN — DIPHENHYDRAMINE HYDROCHLORIDE 50 MG: 50 INJECTION INTRAMUSCULAR; INTRAVENOUS at 02:37

## 2020-02-01 RX ADMIN — SODIUM CHLORIDE, POTASSIUM CHLORIDE, SODIUM LACTATE AND CALCIUM CHLORIDE: 600; 310; 30; 20 INJECTION, SOLUTION INTRAVENOUS at 17:28

## 2020-02-01 RX ADMIN — GABAPENTIN 600 MG: 300 CAPSULE ORAL at 20:00

## 2020-02-01 RX ADMIN — OXYCODONE HYDROCHLORIDE 15 MG: 10 TABLET ORAL at 19:59

## 2020-02-01 RX ADMIN — METOCLOPRAMIDE 10 MG: 5 INJECTION, SOLUTION INTRAMUSCULAR; INTRAVENOUS at 08:25

## 2020-02-01 RX ADMIN — METOCLOPRAMIDE 10 MG: 5 INJECTION, SOLUTION INTRAMUSCULAR; INTRAVENOUS at 17:21

## 2020-02-01 RX ADMIN — DULOXETINE HYDROCHLORIDE 60 MG: 60 CAPSULE, DELAYED RELEASE ORAL at 20:00

## 2020-02-01 RX ADMIN — KETOROLAC TROMETHAMINE 30 MG: 30 INJECTION, SOLUTION INTRAMUSCULAR at 12:22

## 2020-02-01 ASSESSMENT — ENCOUNTER SYMPTOMS
PHOTOPHOBIA: 1
NAUSEA: 0
HEADACHES: 1
ABDOMINAL PAIN: 0
VOMITING: 0
SHORTNESS OF BREATH: 0
CONSTIPATION: 0
NECK PAIN: 0

## 2020-02-01 NOTE — PROGRESS NOTES
Hospital Medicine Daily Progress Note    Date of Service  2/1/2020    Chief Complaint  49 y.o. female admitted 1/28/2020 with migraine.    Hospital Course    PMH migraines, seizure, stroke, hydrocephalus status post lumbar shunt who presented with 1 week of headache not improved by her home medications.  She then developed facial numbness and right arm and leg weakness.  CT head and CTA head and neck were unremarkable, intraventricular shunt was present with stable appearing volume.  Patient unable to have MRI because of spinal stimulator.  She has a history of autoimmune cerebritis, of note ESR and CRP were normal.  She was admitted for complicated migraine.  She has had multiple hospitalizations for uncontrolled migraine pain.      Interval Problem Update  She says her headache is starting to feel better, 7/10. She will likely be able to go home tomorrow.    Consultants/Specialty  None    Code Status  Full    Disposition  Pending symptom control    Review of Systems  Review of Systems   Constitutional: Positive for malaise/fatigue.   HENT: Negative for congestion.    Eyes: Positive for photophobia (improved).   Respiratory: Negative for shortness of breath.    Cardiovascular: Negative for chest pain.   Gastrointestinal: Negative for abdominal pain, constipation, nausea and vomiting.   Genitourinary: Negative for dysuria.   Musculoskeletal: Negative for neck pain.   Skin: Negative for itching.   Neurological: Positive for headaches (improved).        Physical Exam  Temp:  [36.2 °C (97.1 °F)-36.7 °C (98 °F)] 36.7 °C (98 °F)  Pulse:  [72-87] 72  Resp:  [15-17] 16  BP: ()/(50-77) 101/50  SpO2:  [96 %-98 %] 96 %    Physical Exam  Vitals signs and nursing note reviewed.   Constitutional:       General: She is not in acute distress.     Appearance: She is not toxic-appearing or diaphoretic.   HENT:      Head: Normocephalic.      Mouth/Throat:      Mouth: Mucous membranes are moist.   Eyes:      General:          Right eye: No discharge.         Left eye: No discharge.      Extraocular Movements: Extraocular movements intact.      Pupils: Pupils are equal, round, and reactive to light.      Comments: No nystagmus   Cardiovascular:      Rate and Rhythm: Normal rate and regular rhythm.   Pulmonary:      Effort: Pulmonary effort is normal.      Breath sounds: No wheezing or rales.   Abdominal:      Palpations: Abdomen is soft.      Tenderness: There is no tenderness.   Skin:     General: Skin is warm and dry.      Coloration: Skin is pale.   Neurological:      Mental Status: She is alert and oriented to person, place, and time.      Comments: 4-5/5 strength in upper and lower extremities but poor effort  No facial droop, normal speech         Fluids  No intake or output data in the 24 hours ending 02/01/20 1000    Laboratory      Recent Labs     01/30/20  0553   SODIUM 135   POTASSIUM 4.1   CHLORIDE 101   CO2 26   GLUCOSE 87   BUN 10   CREATININE 0.53   CALCIUM 9.0                   Imaging  DX-CHEST-PORTABLE (1 VIEW)   Final Result      No evidence of acute cardiopulmonary process.      CT-CTA NECK WITH & W/O-POST PROCESSING   Final Result      Patent carotid and vertebral arteries bilaterally.      CT-CTA HEAD WITH & W/O-POST PROCESS   Final Result      No evidence of intracranial vascular occlusion or aneurysm.      CT-HEAD W/O   Final Result      1.  No evidence of acute intracranial process.      2.  Right frontal intraventricular catheter present. Stable ventricular volume.           Assessment/Plan  * Complicated migraine- (present on admission)  Assessment & Plan  Brain imaging shows intact intraventricular shunt and volume  Low suspicion for stroke, this consistent with her recurrent migraine requiring hospitalization  Retry ketamine today  Continue supportive care with Benadryl, Toradol, opioid, IV fluids, antiemetics, rest  Headache improving    Substance dependence (HCC)- (present on admission)  Assessment & Plan  I  discussed with the patient that she will not be prescribed opioids on discharge, they are meant to break her headache so she can return home, she understands.    Seizures (HCC)- (present on admission)  Assessment & Plan  Continue keppra, neurontin, depakote.  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

## 2020-02-01 NOTE — CARE PLAN
Problem: Communication  Goal: The ability to communicate needs accurately and effectively will improve  Outcome: PROGRESSING AS EXPECTED     Problem: Safety  Goal: Will remain free from injury  Outcome: PROGRESSING AS EXPECTED  Note:   Appropriate precautions in place     Problem: Pain Management  Goal: Pain level will decrease to patient's comfort goal  Outcome: PROGRESSING SLOWER THAN EXPECTED  Note:   Pain remains largely uncontrolled

## 2020-02-02 PROCEDURE — A9270 NON-COVERED ITEM OR SERVICE: HCPCS | Performed by: INTERNAL MEDICINE

## 2020-02-02 PROCEDURE — 700102 HCHG RX REV CODE 250 W/ 637 OVERRIDE(OP): Performed by: HOSPITALIST

## 2020-02-02 PROCEDURE — A9270 NON-COVERED ITEM OR SERVICE: HCPCS | Performed by: HOSPITALIST

## 2020-02-02 PROCEDURE — 700111 HCHG RX REV CODE 636 W/ 250 OVERRIDE (IP): Performed by: INTERNAL MEDICINE

## 2020-02-02 PROCEDURE — 99233 SBSQ HOSP IP/OBS HIGH 50: CPT | Performed by: INTERNAL MEDICINE

## 2020-02-02 PROCEDURE — 700101 HCHG RX REV CODE 250: Performed by: INTERNAL MEDICINE

## 2020-02-02 PROCEDURE — 770006 HCHG ROOM/CARE - MED/SURG/GYN SEMI*

## 2020-02-02 PROCEDURE — 700105 HCHG RX REV CODE 258: Performed by: HOSPITALIST

## 2020-02-02 PROCEDURE — 700102 HCHG RX REV CODE 250 W/ 637 OVERRIDE(OP): Performed by: INTERNAL MEDICINE

## 2020-02-02 RX ORDER — BUTALBITAL, ACETAMINOPHEN AND CAFFEINE 50; 325; 40 MG/1; MG/1; MG/1
1 TABLET ORAL EVERY 6 HOURS PRN
Status: DISCONTINUED | OUTPATIENT
Start: 2020-02-02 | End: 2020-02-03 | Stop reason: HOSPADM

## 2020-02-02 RX ORDER — VALPROATE SODIUM 100 MG/ML
500 INJECTION INTRAVENOUS ONCE
Status: COMPLETED | OUTPATIENT
Start: 2020-02-02 | End: 2020-02-02

## 2020-02-02 RX ADMIN — METOCLOPRAMIDE 10 MG: 5 INJECTION, SOLUTION INTRAMUSCULAR; INTRAVENOUS at 17:47

## 2020-02-02 RX ADMIN — GABAPENTIN 600 MG: 300 CAPSULE ORAL at 04:11

## 2020-02-02 RX ADMIN — LEVETIRACETAM 500 MG: 500 TABLET ORAL at 04:11

## 2020-02-02 RX ADMIN — HYDROMORPHONE HYDROCHLORIDE 0.5 MG: 1 INJECTION, SOLUTION INTRAMUSCULAR; INTRAVENOUS; SUBCUTANEOUS at 06:04

## 2020-02-02 RX ADMIN — DULOXETINE HYDROCHLORIDE 60 MG: 60 CAPSULE, DELAYED RELEASE ORAL at 08:13

## 2020-02-02 RX ADMIN — HYDROMORPHONE HYDROCHLORIDE 0.5 MG: 1 INJECTION, SOLUTION INTRAMUSCULAR; INTRAVENOUS; SUBCUTANEOUS at 02:02

## 2020-02-02 RX ADMIN — DIPHENHYDRAMINE HYDROCHLORIDE 50 MG: 50 INJECTION INTRAMUSCULAR; INTRAVENOUS at 08:12

## 2020-02-02 RX ADMIN — DIPHENHYDRAMINE HYDROCHLORIDE 50 MG: 50 INJECTION INTRAMUSCULAR; INTRAVENOUS at 11:55

## 2020-02-02 RX ADMIN — OXYCODONE HYDROCHLORIDE 15 MG: 10 TABLET ORAL at 00:04

## 2020-02-02 RX ADMIN — HYDROMORPHONE HYDROCHLORIDE 0.5 MG: 1 INJECTION, SOLUTION INTRAMUSCULAR; INTRAVENOUS; SUBCUTANEOUS at 18:28

## 2020-02-02 RX ADMIN — SENNOSIDES AND DOCUSATE SODIUM 2 TABLET: 8.6; 5 TABLET ORAL at 04:11

## 2020-02-02 RX ADMIN — OXYCODONE HYDROCHLORIDE 15 MG: 10 TABLET ORAL at 04:11

## 2020-02-02 RX ADMIN — HYDROMORPHONE HYDROCHLORIDE 0.5 MG: 1 INJECTION, SOLUTION INTRAMUSCULAR; INTRAVENOUS; SUBCUTANEOUS at 09:58

## 2020-02-02 RX ADMIN — METOCLOPRAMIDE 10 MG: 5 INJECTION, SOLUTION INTRAMUSCULAR; INTRAVENOUS at 08:12

## 2020-02-02 RX ADMIN — ASPIRIN 81 MG 81 MG: 81 TABLET ORAL at 04:11

## 2020-02-02 RX ADMIN — METOCLOPRAMIDE 10 MG: 5 INJECTION, SOLUTION INTRAMUSCULAR; INTRAVENOUS at 11:54

## 2020-02-02 RX ADMIN — DULOXETINE HYDROCHLORIDE 60 MG: 60 CAPSULE, DELAYED RELEASE ORAL at 21:27

## 2020-02-02 RX ADMIN — GABAPENTIN 600 MG: 300 CAPSULE ORAL at 11:55

## 2020-02-02 RX ADMIN — KETAMINE HYDROCHLORIDE 25 MG: 10 INJECTION, SOLUTION INTRAMUSCULAR; INTRAVENOUS at 12:04

## 2020-02-02 RX ADMIN — RISPERIDONE 2 MG: 2 TABLET ORAL at 08:12

## 2020-02-02 RX ADMIN — VALPROATE SODIUM 500 MG: 500 INJECTION INTRAVENOUS at 14:31

## 2020-02-02 RX ADMIN — OXYCODONE HYDROCHLORIDE 15 MG: 10 TABLET ORAL at 21:24

## 2020-02-02 RX ADMIN — SENNOSIDES AND DOCUSATE SODIUM 2 TABLET: 8.6; 5 TABLET ORAL at 17:47

## 2020-02-02 RX ADMIN — DIPHENHYDRAMINE HYDROCHLORIDE 50 MG: 50 INJECTION INTRAMUSCULAR; INTRAVENOUS at 17:47

## 2020-02-02 RX ADMIN — RISPERIDONE 2 MG: 2 TABLET ORAL at 21:25

## 2020-02-02 RX ADMIN — HYDROMORPHONE HYDROCHLORIDE 0.5 MG: 1 INJECTION, SOLUTION INTRAMUSCULAR; INTRAVENOUS; SUBCUTANEOUS at 14:17

## 2020-02-02 RX ADMIN — OXYCODONE HYDROCHLORIDE 15 MG: 10 TABLET ORAL at 08:13

## 2020-02-02 RX ADMIN — HYDROMORPHONE HYDROCHLORIDE 0.5 MG: 1 INJECTION, SOLUTION INTRAMUSCULAR; INTRAVENOUS; SUBCUTANEOUS at 22:49

## 2020-02-02 RX ADMIN — SODIUM CHLORIDE, POTASSIUM CHLORIDE, SODIUM LACTATE AND CALCIUM CHLORIDE: 600; 310; 30; 20 INJECTION, SOLUTION INTRAVENOUS at 04:42

## 2020-02-02 RX ADMIN — DIVALPROEX SODIUM 500 MG: 500 TABLET, DELAYED RELEASE ORAL at 21:27

## 2020-02-02 RX ADMIN — SODIUM CHLORIDE, POTASSIUM CHLORIDE, SODIUM LACTATE AND CALCIUM CHLORIDE: 600; 310; 30; 20 INJECTION, SOLUTION INTRAVENOUS at 23:11

## 2020-02-02 RX ADMIN — DIVALPROEX SODIUM 250 MG: 500 TABLET, DELAYED RELEASE ORAL at 04:11

## 2020-02-02 RX ADMIN — LEVETIRACETAM 500 MG: 500 TABLET ORAL at 17:48

## 2020-02-02 RX ADMIN — DIPHENHYDRAMINE HYDROCHLORIDE 50 MG: 50 INJECTION INTRAMUSCULAR; INTRAVENOUS at 02:48

## 2020-02-02 RX ADMIN — GABAPENTIN 600 MG: 300 CAPSULE ORAL at 21:28

## 2020-02-02 RX ADMIN — DIVALPROEX SODIUM 250 MG: 500 TABLET, DELAYED RELEASE ORAL at 17:47

## 2020-02-02 ASSESSMENT — ENCOUNTER SYMPTOMS
SHORTNESS OF BREATH: 0
HEADACHES: 1
NAUSEA: 0
CONSTIPATION: 0
ABDOMINAL PAIN: 0
VOMITING: 0
NECK PAIN: 0
PHOTOPHOBIA: 1

## 2020-02-02 NOTE — CARE PLAN
Problem: Communication  Goal: The ability to communicate needs accurately and effectively will improve  Outcome: PROGRESSING AS EXPECTED  Note:   Patient A&Ox4, able to make needs known and using call light appropriately for assistance.      Problem: Venous Thromboembolism (VTW)/Deep Vein Thrombosis (DVT) Prevention:  Goal: Patient will participate in Venous Thrombosis (VTE)/Deep Vein Thrombosis (DVT)Prevention Measures  Outcome: PROGRESSING AS EXPECTED  Note:   SCDs ordered and in place.

## 2020-02-02 NOTE — PROGRESS NOTES
Hospital Medicine Daily Progress Note    Date of Service  2/2/2020    Chief Complaint  49 y.o. female admitted 1/28/2020 with migraine.    Hospital Course    PMH migraines, seizure, stroke, hydrocephalus status post lumbar shunt who presented with 1 week of headache not improved by her home medications.  She then developed facial numbness and right arm and leg weakness.  CT head and CTA head and neck were unremarkable, intraventricular shunt was present with stable appearing volume.  Patient unable to have MRI because of spinal stimulator.  She has a history of autoimmune cerebritis, of note ESR and CRP were normal.  She was admitted for complicated migraine.  She has had multiple hospitalizations for uncontrolled migraine pain.      Interval Problem Update  Demerol was stopped because of low BP  She says her HA still very severe. Nausea is ok.    Consultants/Specialty  None    Code Status  Full    Disposition  Pending symptom control    Review of Systems  Review of Systems   Constitutional: Positive for malaise/fatigue.   HENT: Negative for congestion.    Eyes: Positive for photophobia.   Respiratory: Negative for shortness of breath.    Cardiovascular: Negative for chest pain.   Gastrointestinal: Negative for abdominal pain, constipation, nausea and vomiting.   Genitourinary: Negative for dysuria.   Musculoskeletal: Negative for neck pain.   Skin: Negative for itching.   Neurological: Positive for headaches.        Physical Exam  Temp:  [36.6 °C (97.8 °F)-37 °C (98.6 °F)] 36.9 °C (98.5 °F)  Pulse:  [79-85] 85  Resp:  [14-16] 16  BP: ()/(40-71) 101/62  SpO2:  [97 %-100 %] 97 %    Physical Exam  Vitals signs and nursing note reviewed.   Constitutional:       General: She is not in acute distress.     Appearance: She is not toxic-appearing or diaphoretic.   HENT:      Head: Normocephalic.      Mouth/Throat:      Mouth: Mucous membranes are moist.   Eyes:      General:         Right eye: No discharge.          Left eye: No discharge.      Extraocular Movements: Extraocular movements intact.      Pupils: Pupils are equal, round, and reactive to light.   Neck:      Musculoskeletal: Neck supple.   Cardiovascular:      Rate and Rhythm: Normal rate and regular rhythm.   Pulmonary:      Effort: Pulmonary effort is normal.      Breath sounds: No wheezing or rales.   Abdominal:      Palpations: Abdomen is soft.      Tenderness: There is no tenderness.   Skin:     General: Skin is warm and dry.      Coloration: Skin is pale.   Neurological:      Mental Status: She is alert and oriented to person, place, and time.      Comments: 4-5/5 strength in upper and lower extremities but poor effort  No facial droop, normal speech         Fluids    Intake/Output Summary (Last 24 hours) at 2/2/2020 1039  Last data filed at 2/1/2020 1600  Gross per 24 hour   Intake 700 ml   Output --   Net 700 ml       Laboratory                        Imaging  DX-CHEST-PORTABLE (1 VIEW)   Final Result      No evidence of acute cardiopulmonary process.      CT-CTA NECK WITH & W/O-POST PROCESSING   Final Result      Patent carotid and vertebral arteries bilaterally.      CT-CTA HEAD WITH & W/O-POST PROCESS   Final Result      No evidence of intracranial vascular occlusion or aneurysm.      CT-HEAD W/O   Final Result      1.  No evidence of acute intracranial process.      2.  Right frontal intraventricular catheter present. Stable ventricular volume.           Assessment/Plan  * Complicated migraine- (present on admission)  Assessment & Plan  Brain imaging shows intact intraventricular shunt and volume  Low suspicion for stroke, this consistent with her recurrent migraine requiring hospitalization  Continue supportive care with Benadryl, Toradol, opioid, IV fluids, antiemetics, rest. She has been slow to improve.  - will repeat ketamine and ordered for IV depakote today    Substance dependence (HCC)- (present on admission)  Assessment & Plan  I discussed with  the patient that she will not be prescribed opioids on discharge, they are meant to break her headache so she can return home, she understands.    Seizures (HCC)- (present on admission)  Assessment & Plan  Continue keppra, neurontin, depakote.  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

## 2020-02-03 ENCOUNTER — HOSPITAL ENCOUNTER (EMERGENCY)
Facility: MEDICAL CENTER | Age: 50
End: 2020-02-03
Attending: EMERGENCY MEDICINE
Payer: MEDICARE

## 2020-02-03 VITALS
BODY MASS INDEX: 30.74 KG/M2 | DIASTOLIC BLOOD PRESSURE: 68 MMHG | RESPIRATION RATE: 16 BRPM | WEIGHT: 173.5 LBS | HEIGHT: 63 IN | HEART RATE: 75 BPM | TEMPERATURE: 97.9 F | SYSTOLIC BLOOD PRESSURE: 103 MMHG | OXYGEN SATURATION: 99 %

## 2020-02-03 VITALS
DIASTOLIC BLOOD PRESSURE: 62 MMHG | OXYGEN SATURATION: 99 % | WEIGHT: 164.9 LBS | SYSTOLIC BLOOD PRESSURE: 102 MMHG | TEMPERATURE: 97.7 F | HEART RATE: 104 BPM | RESPIRATION RATE: 16 BRPM | HEIGHT: 63 IN | BODY MASS INDEX: 29.22 KG/M2

## 2020-02-03 DIAGNOSIS — T81.30XA WOUND DEHISCENCE: ICD-10-CM

## 2020-02-03 PROCEDURE — 99239 HOSP IP/OBS DSCHRG MGMT >30: CPT | Performed by: INTERNAL MEDICINE

## 2020-02-03 PROCEDURE — 303747 HCHG EXTRA SUTURE

## 2020-02-03 PROCEDURE — 700111 HCHG RX REV CODE 636 W/ 250 OVERRIDE (IP): Performed by: INTERNAL MEDICINE

## 2020-02-03 PROCEDURE — 99283 EMERGENCY DEPT VISIT LOW MDM: CPT

## 2020-02-03 PROCEDURE — A9270 NON-COVERED ITEM OR SERVICE: HCPCS | Performed by: HOSPITALIST

## 2020-02-03 PROCEDURE — 700101 HCHG RX REV CODE 250: Performed by: EMERGENCY MEDICINE

## 2020-02-03 PROCEDURE — 700102 HCHG RX REV CODE 250 W/ 637 OVERRIDE(OP): Performed by: HOSPITALIST

## 2020-02-03 PROCEDURE — 304999 HCHG REPAIR-SIMPLE/INTERMED LEVEL 1

## 2020-02-03 RX ORDER — OXYCODONE HYDROCHLORIDE 10 MG/1
10-15 TABLET ORAL EVERY 6 HOURS PRN
Qty: 10 TAB | Refills: 0 | Status: SHIPPED | OUTPATIENT
Start: 2020-02-03 | End: 2020-02-06

## 2020-02-03 RX ORDER — LIDOCAINE HYDROCHLORIDE AND EPINEPHRINE 10; 10 MG/ML; UG/ML
20 INJECTION, SOLUTION INFILTRATION; PERINEURAL ONCE
Status: COMPLETED | OUTPATIENT
Start: 2020-02-03 | End: 2020-02-03

## 2020-02-03 RX ADMIN — HEPARIN 500 UNITS: 100 SYRINGE at 12:28

## 2020-02-03 RX ADMIN — METOCLOPRAMIDE 10 MG: 5 INJECTION, SOLUTION INTRAMUSCULAR; INTRAVENOUS at 08:01

## 2020-02-03 RX ADMIN — HYDROMORPHONE HYDROCHLORIDE 0.5 MG: 1 INJECTION, SOLUTION INTRAMUSCULAR; INTRAVENOUS; SUBCUTANEOUS at 12:15

## 2020-02-03 RX ADMIN — SENNOSIDES AND DOCUSATE SODIUM 2 TABLET: 8.6; 5 TABLET ORAL at 06:06

## 2020-02-03 RX ADMIN — RISPERIDONE 2 MG: 2 TABLET ORAL at 10:01

## 2020-02-03 RX ADMIN — DIPHENHYDRAMINE HYDROCHLORIDE 50 MG: 50 INJECTION INTRAMUSCULAR; INTRAVENOUS at 08:01

## 2020-02-03 RX ADMIN — DIPHENHYDRAMINE HYDROCHLORIDE 50 MG: 50 INJECTION INTRAMUSCULAR; INTRAVENOUS at 12:15

## 2020-02-03 RX ADMIN — GABAPENTIN 600 MG: 300 CAPSULE ORAL at 06:07

## 2020-02-03 RX ADMIN — LEVETIRACETAM 500 MG: 500 TABLET ORAL at 06:07

## 2020-02-03 RX ADMIN — HYDROMORPHONE HYDROCHLORIDE 0.5 MG: 1 INJECTION, SOLUTION INTRAMUSCULAR; INTRAVENOUS; SUBCUTANEOUS at 03:24

## 2020-02-03 RX ADMIN — METOCLOPRAMIDE 10 MG: 5 INJECTION, SOLUTION INTRAMUSCULAR; INTRAVENOUS at 12:15

## 2020-02-03 RX ADMIN — DULOXETINE HYDROCHLORIDE 60 MG: 60 CAPSULE, DELAYED RELEASE ORAL at 10:01

## 2020-02-03 RX ADMIN — DIPHENHYDRAMINE HYDROCHLORIDE 50 MG: 50 INJECTION INTRAMUSCULAR; INTRAVENOUS at 03:24

## 2020-02-03 RX ADMIN — HYDROMORPHONE HYDROCHLORIDE 0.5 MG: 1 INJECTION, SOLUTION INTRAMUSCULAR; INTRAVENOUS; SUBCUTANEOUS at 08:01

## 2020-02-03 RX ADMIN — LIDOCAINE HYDROCHLORIDE,EPINEPHRINE BITARTRATE 20 ML: 10; .01 INJECTION, SOLUTION INFILTRATION; PERINEURAL at 16:30

## 2020-02-03 RX ADMIN — ASPIRIN 81 MG 81 MG: 81 TABLET ORAL at 06:07

## 2020-02-03 RX ADMIN — DIVALPROEX SODIUM 250 MG: 500 TABLET, DELAYED RELEASE ORAL at 06:07

## 2020-02-03 NOTE — CARE PLAN
Problem: Communication  Goal: The ability to communicate needs accurately and effectively will improve  Outcome: PROGRESSING AS EXPECTED  Note:   Pt. Is A&O x 4. Follows commands and responds appropriately. Will continue to round hourly and encourage the Pt. To ask questions and voice feelings.       Problem: Safety  Goal: Will remain free from injury  Outcome: PROGRESSING AS EXPECTED  Note:   Pt is a standby assist and uses call light appropriately. Room has adequate lighting, is free of clutter, call light is within reach, and bed is locked and in the lowest position. Will continue to hourly round.

## 2020-02-03 NOTE — DISCHARGE SUMMARY
"Discharge Summary    CHIEF COMPLAINT ON ADMISSION  Chief Complaint   Patient presents with   • Possible Stroke     \"numbness and tingling r. hand, r.elbow, and r. side of face.\" Onset 1200 today. GCS 15. Stroke IR called from triage room.        Reason for Admission  Headache     Admission Date  1/28/2020    CODE STATUS  Full Code    HPI & HOSPITAL COURSE  This is a 49 y.o. female here with PMH migraines, seizure, stroke, hydrocephalus status post lumbar shunt who presented with 1 week of headache not improved by her home medications.  She then developed facial numbness and right arm and leg weakness.  CT head and CTA head and neck were unremarkable, intraventricular shunt was present with stable appearing volume.  She is unable to have MRI because of stimulator. She has a history of autoimmune cerebritis, of note ESR and CRP were normal.  Her neuro exam was unremarkable and she was admitted for complicated migraine. She has had multiple hospitalizations for uncontrolled migraine pain in the past.   She was treated with IV benadryl, toradol, dilaudid and oxycodone, demerol, ketamine, IV depakote with some improvement. She was able to ambulate well on her own in the hallways and tolerated her diet. I will discharge her with a short prescription of oxycodone for her residual headache and patient understands to taper off. She was prescribed oxycodone last month after a surgery, she says she finished that rx. She had sutures in place from a prior port that was removed 10 days ago, the stitches removed by nursing prior to discharge.  I discussed with her to follow up with her pain management specialist closely.    Therefore, she is discharged in good and stable condition to home with close outpatient follow-up.    The patient recovered much more quickly than anticipated on admission.    Discharge Date  2/3/20    FOLLOW UP ITEMS POST DISCHARGE  F/u with pain specialist and neurologist for ongoing migraine " management    DISCHARGE DIAGNOSES  Principal Problem:    Complicated migraine POA: Yes  Active Problems:    S/P  shunt POA: Yes    Seizures (HCC) POA: Yes    Substance dependence (HCC) POA: Yes    Autoimmune cerebritis (HCC) POA: Yes    Chronic pain syndrome POA: Yes  Resolved Problems:    * No resolved hospital problems. *      FOLLOW UP  No follow-up provider specified.    MEDICATIONS ON DISCHARGE     Medication List      START taking these medications      Instructions   oxyCODONE immediate release 10 MG immediate release tablet  Commonly known as:  ROXICODONE   Take 1-1.5 Tabs by mouth every 6 hours as needed for Severe Pain for up to 3 days.  Dose:  10-15 mg        CONTINUE taking these medications      Instructions   aspirin 81 MG Chew chewable tablet  Commonly known as:  ASA   Take 81 mg by mouth every morning.  Dose:  81 mg     * divalproex 250 MG Tbec  Commonly known as:  DEPAKOTE   Take 250 mg by mouth 2 Times a Day.  Dose:  250 mg     * divalproex 500 MG Tbec  Commonly known as:  DEPAKOTE   Take 1 Tab by mouth every bedtime.  Dose:  500 mg     DULoxetine 60 MG Cpep delayed-release capsule  Commonly known as:  CYMBALTA   Take 60 mg by mouth 2 times a day.  Dose:  60 mg     Emgality 120 MG/ML Soaj  Generic drug:  Galcanezumab-gnlm   Inject 120 mg as instructed Q30 DAYS.  Dose:  120 mg     gabapentin 600 MG tablet  Commonly known as:  NEURONTIN   Take 600 mg by mouth 3 times a day.  Dose:  600 mg     levETIRAcetam 500 MG Tabs  Commonly known as:  KEPPRA   Take 500 mg by mouth 2 Times a Day.  Dose:  500 mg     risperiDONE 2 MG Tabs  Commonly known as:  RISPERDAL   Take 2 mg by mouth 2 times a day.  Dose:  2 mg         * This list has 2 medication(s) that are the same as other medications prescribed for you. Read the directions carefully, and ask your doctor or other care provider to review them with you.                Allergies  Allergies   Allergen Reactions   • Sumatriptan Anaphylaxis      "Historical=\"Heart stops.\" Reaction  between 1995 to 1997   • Prochlorperazine Swelling     Tongue swelling. Reaction as a teen. (compazine)  Tolerated Phenergan on 02/24/15   • Vancomycin Shortness of Breath and Rash     Reaction in 2005.  D/W patient 8/31/15 - tolerated loading dose of vancomycin administered on 8/30/15 with some itching to chest with decreased infusion rate. Red Man Syndrome.  2018-tolerated slow drip with benadryl pre-med-had no problems.   • Compazine Swelling       DIET  Orders Placed This Encounter   Procedures   • Diet Order Regular (hot meal please)     Standing Status:   Standing     Number of Occurrences:   1     Order Specific Question:   Diet:     Answer:   Regular [1]     Comments:   hot meal please       ACTIVITY  As tolerated.  Weight bearing as tolerated    CONSULTATIONS  None    PROCEDURES  DX-CHEST-PORTABLE (1 VIEW)   Final Result      No evidence of acute cardiopulmonary process.      CT-CTA NECK WITH & W/O-POST PROCESSING   Final Result      Patent carotid and vertebral arteries bilaterally.      CT-CTA HEAD WITH & W/O-POST PROCESS   Final Result      No evidence of intracranial vascular occlusion or aneurysm.      CT-HEAD W/O   Final Result      1.  No evidence of acute intracranial process.      2.  Right frontal intraventricular catheter present. Stable ventricular volume.            LABORATORY  Lab Results   Component Value Date    SODIUM 135 01/30/2020    POTASSIUM 4.1 01/30/2020    CHLORIDE 101 01/30/2020    CO2 26 01/30/2020    GLUCOSE 87 01/30/2020    BUN 10 01/30/2020    CREATININE 0.53 01/30/2020        Lab Results   Component Value Date    WBC 5.4 01/29/2020    HEMOGLOBIN 12.0 01/29/2020    HEMATOCRIT 36.9 (L) 01/29/2020    PLATELETCT 317 01/29/2020        Total time of the discharge process exceeds 32 minutes.  "

## 2020-02-03 NOTE — DOCUMENTATION QUERY
"                                                                         Former[facility]                                                                       Query Response Note      PATIENT:               BRIGID DAWSON  ACCT #:                  [id number]  MRN:                     [id number]  :                      1970  ADMIT DATE:       2020 7:31 PM  DISCH DATE:          RESPONDING  PROVIDER #:        [id number]           QUERY TEXT:    \"Severe malnutrition\" is documented in the Dietary Note dated .  Please specify if you agree or disagree with this finding    NOTE:  If an appropriate response is not listed below, please respond with a new note.    The patient's Clinical Indicators include:   Dietary Note: Severe chronic illness malnutrition secondary to poor PO intake evidenced by severe weight loss, consuming 50% usual PO intake x 6 months.  6% weight loss x 1 month (severe).   Treatment: Dietary consult; encourage oral intake; monitor wt; nutrition rep  Risk Factors: Chronic illness; poor appetite  Options provided:   -- Agree with dietician finding of severe malnutrition   -- Disagree with dietician finding of severe malnutrition   -- Finding of no clinical significance   -- Unable to determine      Query created by: Мария Willoughby on 2/3/2020 1:33 PM    RESPONSE TEXT:    Agree with dietician finding of severe malnutrition          Electronically signed by:  CALLUM CURRAN MD 2/3/2020 1:36 PM              "

## 2020-02-03 NOTE — PROGRESS NOTES
Patient discharged via wheelchair to car with spouse. Discharge education provided to patient, pt and spouse verbalized understanding. Scripts provided. Port deaccessed and heparin locked. Sutures to right chest removed per order. All belongings accounted for.

## 2020-02-03 NOTE — ED TRIAGE NOTES
Pt report recent d/c from neuro approx 1 hour ago. Pt stating they removed sutures that were due to come out and was d/c'd. Pt stating at the time of d/c wound was closed. Pt arrives here with wound open.

## 2020-02-03 NOTE — DISCHARGE INSTRUCTIONS
Discharge Instructions per Jeane Petit M.D.    DIAGNOSIS: Complex migraine. Please follow up with your pain management doctor.    Return to ER if you have uncontrolled headache or vomiting.    Discharge Instructions    Discharged to home by car with relative. Discharged via wheelchair, hospital escort: Refused.  Special equipment needed: Not Applicable    Be sure to schedule a follow-up appointment with your primary care doctor or any specialists as instructed.     Discharge Plan:   Diet Plan: Discussed  Activity Level: Discussed  Confirmed Follow up Appointment: Patient to Call and Schedule Appointment  Confirmed Symptoms Management: Discussed  Medication Reconciliation Updated: Yes  Influenza Vaccine Indication: Not indicated: Previously immunized this influenza season and > 8 years of age    I understand that a diet low in cholesterol, fat, and sodium is recommended for good health. Unless I have been given specific instructions below for another diet, I accept this instruction as my diet prescription.   Other diet: Regular    Special Instructions: None    · Is patient discharged on Warfarin / Coumadin?   No     Depression / Suicide Risk    As you are discharged from this [facility] facility, it is important to learn how to keep safe from harming yourself.    Recognize the warning signs:  · Abrupt changes in personality, positive or negative- including increase in energy   · Giving away possessions  · Change in eating patterns- significant weight changes-  positive or negative  · Change in sleeping patterns- unable to sleep or sleeping all the time   · Unwillingness or inability to communicate  · Depression  · Unusual sadness, discouragement and loneliness  · Talk of wanting to die  · Neglect of personal appearance   · Rebelliousness- reckless behavior  · Withdrawal from people/activities they love  · Confusion- inability to concentrate     If you or a loved one observes any of these behaviors or has  concerns about self-harm, here's what you can do:  · Talk about it- your feelings and reasons for harming yourself  · Remove any means that you might use to hurt yourself (examples: pills, rope, extension cords, firearm)  · Get professional help from the community (Mental Health, Substance Abuse, psychological counseling)  · Do not be alone:Call your Safe Contact- someone whom you trust who will be there for you.  · Call your local CRISIS HOTLINE 517-9454 or 385-505-1415  · Call your local Children's Mobile Crisis Response Team [place] (127) 602-9276 or www.[facility]  · Call the toll free National Suicide Prevention Hotlines   · National Suicide Prevention Lifeline 077-914-LCYJ (9249)  · National Hope Line Network 800-SUICIDE (360-0228)

## 2020-02-04 NOTE — ED PROVIDER NOTES
ED Provider Note    CHIEF COMPLAINT  Chief Complaint   Patient presents with   • Wound Check       HPI  Enedelia Pang is a 49 y.o. female who presents for a wound check.  10 days ago the patient had a port removed from her right chest region.  Stitches were placed.  Subsequent to that she was admitted to the hospital for migraine headaches.  She was being discharged today and the stitches were removed on the neuro floor.  She was driving home when she felt something wet on her chest and looked down and saw that she was having seepage from her suture site and that the wound had opened up.    REVIEW OF SYSTEMS  See HPI for further details. All other systems negative.    PAST MEDICAL HISTORY  Past Medical History:   Diagnosis Date   • Allergy, unspecified not elsewhere classified    • Anemia 10/05/2017    Unsure if a current issue.   • Anesthesia     Migrane after anesthesia   • Anxiety    • Anxiety, generalized    • Arthritis    • autoimmune    • Autoimmune disorder (HCC)     Unknown etiology or type   • Back pain    • Clostridium difficile diarrhea 2014    follow up tests negative   • Depression    • Elevated liver enzymes 2017    Related to meds.   • Fall    • H/O Clostridium difficile infection 2014   • Hx MRSA infection 2003    with neurostimulator implant   • Hydrocephalus (HCC) 2015    with lumbar shunt   • Joint pain 09/10/2019    Especially right shoulder   • Migraine with aura, with intractable migraine, so stated, without mention of status migrainosus     from undefined autoimmune issue   • MRSA (methicillin resistant Staphylococcus aureus) 08/2015    to lumbar shunt and power port   • MRSA (methicillin resistant Staphylococcus aureus) 12/2017    left foot autoimmune decay   • MRSA (methicillin resistant Staphylococcus aureus) 2018    to neurostimulator.    • Pituitary abnormality (HCC) 2002   • Requires supplemental oxygen     to treat migraines. 2-5L/NC   • Seizure (HCC) started 2017    Unknown  etiology-not sure if related to autoimmune issue. Last seizure 2/2019   • Staph infection    • Stroke (HCC) 2007     with right side residual weakness       FAMILY HISTORY  Family History   Problem Relation Age of Onset   • Mult Sclerosis Mother    • Diabetes Father    • Alcohol abuse Father        SOCIAL HISTORY  Social History     Socioeconomic History   • Marital status:      Spouse name: Not on file   • Number of children: Not on file   • Years of education: Not on file   • Highest education level: Not on file   Occupational History   • Occupation:      Comment: on disability   Social Needs   • Financial resource strain: Not on file   • Food insecurity:     Worry: Often true     Inability: Often true   • Transportation needs:     Medical: Yes     Non-medical: Yes   Tobacco Use   • Smoking status: Never Smoker   • Smokeless tobacco: Never Used   Substance and Sexual Activity   • Alcohol use: Not Currently     Frequency: Never     Binge frequency: Never   • Drug use: Never   • Sexual activity: Not on file   Lifestyle   • Physical activity:     Days per week: Not on file     Minutes per session: Not on file   • Stress: Not on file   Relationships   • Social connections:     Talks on phone: Not on file     Gets together: Not on file     Attends Confucianist service: Not on file     Active member of club or organization: Not on file     Attends meetings of clubs or organizations: Not on file     Relationship status: Not on file   • Intimate partner violence:     Fear of current or ex partner: Not on file     Emotionally abused: Not on file     Physically abused: Not on file     Forced sexual activity: Not on file   Other Topics Concern   • Not on file   Social History Narrative   • Not on file       SURGICAL HISTORY  Past Surgical History:   Procedure Laterality Date   • OTHER Left 01/23/2020    Port placement left chest   • PB IMPLANT NEUROSTIM/  9/13/2019    Procedure: INSERTION,  "NEUROSTIMULATOR, PERMANENT, SPINAL CORD;  Surgeon: Seven Mahoney M.D.;  Location: SURGERY [facility];  Service: Pain Management   • SPINAL CORD STIMULATOR  2018    Explanted   • FULL THICKNESS SKIN GRAFT Left 2018     graft to foot (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   • OTHER SURGICAL PROCEDURE  11/10/2017    Power port   • LIVER BIOPSY  2017   •  SHUNT INSERTION  2017    Procedure:  SHUNT INSERTION;  Surgeon: Drew Berry M.D.;  Location: SURGERY [facility];  Service:    • SPINAL CORD STIMULATOR  2016    Procedure: SPINAL CORD STIMULATOR FOR: PLACEMENT OF LEFT FLANK OCCIPITAL NERVE STIMULATOR BATTERY PACK;  Surgeon: Oli Oh III, M.D.;  Location: SURGERY [facility];  Service:    • LUMBAR EXPLORATION N/A 2015    Procedure: LUMBAR EXPLORATION- Lumbar shunt removal ;  Surgeon: Oli Oh III, M.D.;  Location: SURGERY [facility];  Service:    • OTHER NEUROLOGICAL SURG  2015    Explanted lumbar shunt and explanted power port due to MRSA   • IRRIGATION & DEBRIDEMENT NEURO N/A 2015    Procedure: IRRIGATION & DEBRIDEMENT NEURO;  Surgeon: Oli Oh III, M.D.;  Location: SURGERY [facility];  Service:    • SPINAL CORD STIMULATOR      1st implant   • CHOLECYSTECTOMY      had ruptured day before   • ENDOMETRIAL ABLATION     • TUBAL COAGULATION LAPAROSCOPIC BILATERAL Bilateral    • KNEE ARTHROSCOPY Right    • TONSILLECTOMY     • APPENDECTOMY     • OTHER NEUROLOGICAL SURG  [phone]    occipital nerve stimulator-revisions for total of 9 procedures       CURRENT MEDICATIONS  Home Medications    **Home medications have not yet been reviewed for this encounter**         ALLERGIES  Allergies   Allergen Reactions   • Sumatriptan Anaphylaxis     Historical=\"Heart stops.\" Reaction  between  to    • Prochlorperazine Swelling     Tongue swelling. Reaction as a teen. " "(compazine)  Tolerated Phenergan on 02/24/15   • Vancomycin Shortness of Breath and Rash     Reaction in 2005.  D/W patient 8/31/15 - tolerated loading dose of vancomycin administered on 8/30/15 with some itching to chest with decreased infusion rate. Red Man Syndrome.  2018-tolerated slow drip with benadryl pre-med-had no problems.   • Compazine Swelling       PHYSICAL EXAM  VITAL SIGNS: /67   Pulse (!) 104   Temp 36.5 °C (97.7 °F) (Temporal)   Resp 16   Ht 1.6 m (5' 3\")   Wt 74.8 kg (164 lb 14.5 oz)   SpO2 99%   BMI 29.21 kg/m²   Constitutional: Well developed, Well nourished, No acute distress, Non-toxic appearance.   HENT: Normocephalic, Atraumatic.  Eyes:  EOMI, Conjunctiva normal, No discharge.   Cardiovascular: Tachycardic  Thorax & Lungs: No respiratory distress.  4 cm dehisced wound to the right upper chest.  No active bleeding or drainage.  Skin: Warm, Dry.  Musculoskeletal: Good range of motion in all major joints.  Neurologic: Awake and alert, No focal deficits noted.     Laceration Repair Procedure Note    Indication: Wound dehiscence    Procedure: The patient was placed in the appropriate position and anesthesia around the laceration was obtained by infiltration using 1% Lidocaine with epinephrine. The area was then thoroughly cleaned with normal saline. The laceration was loosely approximated with 5-0 Ethilon. There were no additional lacerations requiring repair. The wound area was then dressed with a sterile dressing.      Total repaired wound length: 4 cm.     Other Items: None    The patient tolerated the procedure well.    Complications: None          COURSE & MEDICAL DECISION MAKING  Pertinent Labs & Imaging studies reviewed. (See chart for details)  This is a 49-year-old here for evaluation of a wound dehiscence.  She had sutures removed earlier today at her previous right chest port site.  The wound is loosely approximated per the procedure note above.  Dressing is applied.  I " will have the patient contact her surgeon tomorrow morning for follow-up.  She is given a discharge instruction sheet on wound dehiscence    FINAL IMPRESSION  1.  Surgical wound dehiscence  2.  Approximation of wound dehiscence  3.         Electronically signed by: Blas Logan M.D., 2/3/2020 4:37 PM

## 2020-02-07 ENCOUNTER — HOSPITAL ENCOUNTER (EMERGENCY)
Facility: MEDICAL CENTER | Age: 50
End: 2020-02-07
Attending: EMERGENCY MEDICINE
Payer: MEDICARE

## 2020-02-07 VITALS
OXYGEN SATURATION: 98 % | RESPIRATION RATE: 16 BRPM | HEIGHT: 63 IN | HEART RATE: 87 BPM | DIASTOLIC BLOOD PRESSURE: 59 MMHG | WEIGHT: 160 LBS | BODY MASS INDEX: 28.35 KG/M2 | SYSTOLIC BLOOD PRESSURE: 93 MMHG | TEMPERATURE: 97.9 F

## 2020-02-07 DIAGNOSIS — T50.901A ACCIDENTAL DRUG OVERDOSE, INITIAL ENCOUNTER: ICD-10-CM

## 2020-02-07 DIAGNOSIS — G43.909 MIGRAINE WITHOUT STATUS MIGRAINOSUS, NOT INTRACTABLE, UNSPECIFIED MIGRAINE TYPE: ICD-10-CM

## 2020-02-07 LAB
ALBUMIN SERPL BCP-MCNC: 5 G/DL (ref 3.2–4.9)
ALBUMIN/GLOB SERPL: 1.7 G/DL
ALP SERPL-CCNC: 127 U/L (ref 30–99)
ALT SERPL-CCNC: 40 U/L (ref 2–50)
ANION GAP SERPL CALC-SCNC: 12 MMOL/L (ref 0–11.9)
AST SERPL-CCNC: 12 U/L (ref 12–45)
BASOPHILS # BLD AUTO: 0.4 % (ref 0–1.8)
BASOPHILS # BLD: 0.03 K/UL (ref 0–0.12)
BILIRUB SERPL-MCNC: 0.3 MG/DL (ref 0.1–1.5)
BUN SERPL-MCNC: 9 MG/DL (ref 8–22)
CALCIUM SERPL-MCNC: 10.3 MG/DL (ref 8.5–10.5)
CHLORIDE SERPL-SCNC: 107 MMOL/L (ref 96–112)
CO2 SERPL-SCNC: 18 MMOL/L (ref 20–33)
CREAT SERPL-MCNC: 0.65 MG/DL (ref 0.5–1.4)
D DIMER PPP IA.FEU-MCNC: <0.4 UG/ML (FEU) (ref 0–0.5)
EOSINOPHIL # BLD AUTO: 0 K/UL (ref 0–0.51)
EOSINOPHIL NFR BLD: 0 % (ref 0–6.9)
ERYTHROCYTE [DISTWIDTH] IN BLOOD BY AUTOMATED COUNT: 47.3 FL (ref 35.9–50)
ETHANOL BLD-MCNC: 0.01 G/DL
GLOBULIN SER CALC-MCNC: 2.9 G/DL (ref 1.9–3.5)
GLUCOSE SERPL-MCNC: 94 MG/DL (ref 65–99)
HCG SERPL QL: NEGATIVE
HCT VFR BLD AUTO: 43 % (ref 37–47)
HGB BLD-MCNC: 14.8 G/DL (ref 12–16)
IMM GRANULOCYTES # BLD AUTO: 0.03 K/UL (ref 0–0.11)
IMM GRANULOCYTES NFR BLD AUTO: 0.4 % (ref 0–0.9)
LYMPHOCYTES # BLD AUTO: 1.04 K/UL (ref 1–4.8)
LYMPHOCYTES NFR BLD: 14.6 % (ref 22–41)
MCH RBC QN AUTO: 29.8 PG (ref 27–33)
MCHC RBC AUTO-ENTMCNC: 34.4 G/DL (ref 33.6–35)
MCV RBC AUTO: 86.5 FL (ref 81.4–97.8)
MONOCYTES # BLD AUTO: 0.47 K/UL (ref 0–0.85)
MONOCYTES NFR BLD AUTO: 6.6 % (ref 0–13.4)
NEUTROPHILS # BLD AUTO: 5.55 K/UL (ref 2–7.15)
NEUTROPHILS NFR BLD: 78 % (ref 44–72)
NRBC # BLD AUTO: 0 K/UL
NRBC BLD-RTO: 0 /100 WBC
PLATELET # BLD AUTO: 389 K/UL (ref 164–446)
PMV BLD AUTO: 10.1 FL (ref 9–12.9)
POTASSIUM SERPL-SCNC: 4.1 MMOL/L (ref 3.6–5.5)
PROT SERPL-MCNC: 7.9 G/DL (ref 6–8.2)
RBC # BLD AUTO: 4.97 M/UL (ref 4.2–5.4)
SODIUM SERPL-SCNC: 137 MMOL/L (ref 135–145)
TROPONIN T SERPL-MCNC: 9 NG/L (ref 6–19)
VALPROATE SERPL-MCNC: 99.3 UG/ML (ref 50–100)
WBC # BLD AUTO: 7.1 K/UL (ref 4.8–10.8)

## 2020-02-07 PROCEDURE — 96375 TX/PRO/DX INJ NEW DRUG ADDON: CPT

## 2020-02-07 PROCEDURE — 96374 THER/PROPH/DIAG INJ IV PUSH: CPT

## 2020-02-07 PROCEDURE — 700101 HCHG RX REV CODE 250: Performed by: EMERGENCY MEDICINE

## 2020-02-07 PROCEDURE — 700111 HCHG RX REV CODE 636 W/ 250 OVERRIDE (IP): Performed by: STUDENT IN AN ORGANIZED HEALTH CARE EDUCATION/TRAINING PROGRAM

## 2020-02-07 PROCEDURE — 85379 FIBRIN DEGRADATION QUANT: CPT

## 2020-02-07 PROCEDURE — 93005 ELECTROCARDIOGRAM TRACING: CPT | Performed by: EMERGENCY MEDICINE

## 2020-02-07 PROCEDURE — 80053 COMPREHEN METABOLIC PANEL: CPT

## 2020-02-07 PROCEDURE — 700111 HCHG RX REV CODE 636 W/ 250 OVERRIDE (IP): Performed by: EMERGENCY MEDICINE

## 2020-02-07 PROCEDURE — 700105 HCHG RX REV CODE 258: Performed by: STUDENT IN AN ORGANIZED HEALTH CARE EDUCATION/TRAINING PROGRAM

## 2020-02-07 PROCEDURE — 96376 TX/PRO/DX INJ SAME DRUG ADON: CPT

## 2020-02-07 PROCEDURE — 80307 DRUG TEST PRSMV CHEM ANLYZR: CPT

## 2020-02-07 PROCEDURE — 84484 ASSAY OF TROPONIN QUANT: CPT

## 2020-02-07 PROCEDURE — 85025 COMPLETE CBC W/AUTO DIFF WBC: CPT

## 2020-02-07 PROCEDURE — 84703 CHORIONIC GONADOTROPIN ASSAY: CPT

## 2020-02-07 PROCEDURE — 700105 HCHG RX REV CODE 258: Performed by: EMERGENCY MEDICINE

## 2020-02-07 PROCEDURE — 99285 EMERGENCY DEPT VISIT HI MDM: CPT

## 2020-02-07 PROCEDURE — 80164 ASSAY DIPROPYLACETIC ACD TOT: CPT

## 2020-02-07 RX ORDER — SODIUM CHLORIDE 9 MG/ML
1000 INJECTION, SOLUTION INTRAVENOUS ONCE
Status: COMPLETED | OUTPATIENT
Start: 2020-02-07 | End: 2020-02-07

## 2020-02-07 RX ORDER — DIPHENHYDRAMINE HYDROCHLORIDE 50 MG/ML
25 INJECTION INTRAMUSCULAR; INTRAVENOUS ONCE
Status: COMPLETED | OUTPATIENT
Start: 2020-02-07 | End: 2020-02-07

## 2020-02-07 RX ORDER — METOCLOPRAMIDE HYDROCHLORIDE 5 MG/ML
5 INJECTION INTRAMUSCULAR; INTRAVENOUS ONCE
Status: COMPLETED | OUTPATIENT
Start: 2020-02-07 | End: 2020-02-07

## 2020-02-07 RX ORDER — HYDROMORPHONE HYDROCHLORIDE 1 MG/ML
0.5 INJECTION, SOLUTION INTRAMUSCULAR; INTRAVENOUS; SUBCUTANEOUS ONCE
Status: DISCONTINUED | OUTPATIENT
Start: 2020-02-07 | End: 2020-02-07

## 2020-02-07 RX ORDER — KETOROLAC TROMETHAMINE 30 MG/ML
15 INJECTION, SOLUTION INTRAMUSCULAR; INTRAVENOUS ONCE
Status: COMPLETED | OUTPATIENT
Start: 2020-02-07 | End: 2020-02-07

## 2020-02-07 RX ORDER — HYDROMORPHONE HYDROCHLORIDE 1 MG/ML
0.5 INJECTION, SOLUTION INTRAMUSCULAR; INTRAVENOUS; SUBCUTANEOUS ONCE
Status: COMPLETED | OUTPATIENT
Start: 2020-02-07 | End: 2020-02-07

## 2020-02-07 RX ORDER — SODIUM CHLORIDE 9 MG/ML
2000 INJECTION, SOLUTION INTRAVENOUS ONCE
Status: COMPLETED | OUTPATIENT
Start: 2020-02-07 | End: 2020-02-07

## 2020-02-07 RX ADMIN — SODIUM CHLORIDE 1000 ML: 9 INJECTION, SOLUTION INTRAVENOUS at 18:45

## 2020-02-07 RX ADMIN — SODIUM CHLORIDE 2000 ML: 9 INJECTION, SOLUTION INTRAVENOUS at 15:29

## 2020-02-07 RX ADMIN — DIPHENHYDRAMINE HYDROCHLORIDE 25 MG: 50 INJECTION INTRAMUSCULAR; INTRAVENOUS at 15:32

## 2020-02-07 RX ADMIN — DIPHENHYDRAMINE HYDROCHLORIDE 25 MG: 50 INJECTION INTRAMUSCULAR; INTRAVENOUS at 17:03

## 2020-02-07 RX ADMIN — KETAMINE HYDROCHLORIDE 25 MG: 10 INJECTION, SOLUTION INTRAMUSCULAR; INTRAVENOUS at 21:05

## 2020-02-07 RX ADMIN — METOCLOPRAMIDE 5 MG: 5 INJECTION, SOLUTION INTRAMUSCULAR; INTRAVENOUS at 15:35

## 2020-02-07 RX ADMIN — KETOROLAC TROMETHAMINE 15 MG: 30 INJECTION, SOLUTION INTRAMUSCULAR; INTRAVENOUS at 15:29

## 2020-02-07 RX ADMIN — HYDROMORPHONE HYDROCHLORIDE 0.5 MG: 1 INJECTION, SOLUTION INTRAMUSCULAR; INTRAVENOUS; SUBCUTANEOUS at 16:59

## 2020-02-07 ASSESSMENT — LIFESTYLE VARIABLES: DO YOU DRINK ALCOHOL: NO

## 2020-02-07 NOTE — ED PROVIDER NOTES
"ED Provider Note    Scribed for Edwardo Aiken M.D. by Blake Guajardo. 2/7/2020, 2:47 PM.    Primary care provider: Elliott Power M.D.  Means of arrival: Personal vehicle  History obtained from: Patient and has been  History limited by: Nothing    CHIEF COMPLAINT  Chief Complaint   Patient presents with   • Drug Overdose     BIB EMS.  Per patient she took an extra dose of Depakote last night and this morning.  After am dose noted tremors.          HPI  Enedelia Pang is a 49 y.o. female with a past medical history of chronic migraines who presents to the Emergency Department with complaints of accidental overdose of her Depakote medication.  She takes Depakote 250 in the morning, in the afternoon and 500 mg at night for migraine prophylaxis.  She accidentally ingested an extra 500 mg of Depakote yesterday evening around 8 PM as well as an additional 250 mg of Depakote this morning at around 8 AM.  Shortly after this patient reports noticing \"tremors \"of the hands and sometimes the abdomen.  Tremors have persisted since onset she reports they have precipitated a migraine headache.  Currently in the ER her migraine headache is a 10 a 10 on pain scale.  Typically her headaches are relieved with 60 mg IM Toradol and 25 mg Benadryl.  She has some left-sided muscular chest pain-that she relates to the recent switch of her port from the right side of the chest to the left side of the chest.  Denies specifically chest pain, cough, shortness of breath, abdominal pain, nausea, vomiting, diarrhea, fevers, urinary symptoms.  She denies suicidal or homicidal ideation. Headache is exactly similar to prior migraines.    EMR review reveals an ER visit recently on 3 February for a wound dehiscence of the right anterior chest.  Port was changed from the right to the left side of the chest earlier in January.  She denies illicit drug abuse including cocaine, methamphetamine and heroin.  She does smoke marijuana on occasion, last use " was approximately 1 month ago.    REVIEW OF SYSTEMS  Pertinent positives as stated above. Pertinent negatives as stated above.  All other systems reviewed and negative.    PAST MEDICAL HISTORY   has a past medical history of Allergy, unspecified not elsewhere classified, Anemia (10/05/2017), Anesthesia, Anxiety, Anxiety, generalized, Arthritis, autoimmune, Autoimmune disorder (HCC), Back pain, Clostridium difficile diarrhea (2014), Depression, Elevated liver enzymes (2017), Fall, H/O Clostridium difficile infection (2014), MRSA infection (2003), Hydrocephalus ([facility]) (2015), Joint pain (09/10/2019), Migraine with aura, with intractable migraine, so stated, without mention of status migrainosus, MRSA (methicillin resistant Staphylococcus aureus) (08/2015), MRSA (methicillin resistant Staphylococcus aureus) (12/2017), MRSA (methicillin resistant Staphylococcus aureus) (2018), Pituitary abnormality ([facility]) (2002), Requires supplemental oxygen, Seizure ([facility]) (started 2017), Staph infection, and Stroke ([facility]) (2007).    SURGICAL HISTORY   has a past surgical history that includes tonsillectomy (1985); other neurological surg ([id number]); irrigation & debridement neuro (N/A, 6/28/2015); lumbar exploration (N/A, 8/31/2015); spinal cord stimulator (12/19/2016);  shunt insertion (5/31/2017); liver biopsy (07/24/2017); cholecystectomy (2001); appendectomy (1982); spinal cord stimulator (05/24/2018); spinal cord stimulator (2003); knee arthroscopy (Right, 1990); full thickness skin graft (Left, 04/2018); other surgical procedure (11/10/2017); other neurological surg (08/2015); endometrial ablation (2001); tubal coagulation laparoscopic bilateral (Bilateral, 2001); implant neurostim/ (9/13/2019); and other (Left, 01/23/2020).    SOCIAL HISTORY  Social History     Tobacco Use   • Smoking status: Never Smoker   • Smokeless tobacco: Never Used   Substance Use Topics   • Alcohol use: Not Currently     Frequency: Never      "Binge frequency: Never   • Drug use: Never      Social History     Substance and Sexual Activity   Drug Use Never       FAMILY HISTORY  Family History   Problem Relation Age of Onset   • Mult Sclerosis Mother    • Diabetes Father    • Alcohol abuse Father        CURRENT MEDICATIONS  Home Medications    **Home medications have not yet been reviewed for this encounter**         ALLERGIES  Allergies   Allergen Reactions   • Sumatriptan Anaphylaxis     Historical=\"Heart stops.\" Reaction  between 1995 to 1997   • Prochlorperazine Swelling     Tongue swelling. Reaction as a teen. (compazine)  Tolerated Phenergan on 02/24/15   • Vancomycin Shortness of Breath and Rash     Reaction in 2005.  D/W patient 8/31/15 - tolerated loading dose of vancomycin administered on 8/30/15 with some itching to chest with decreased infusion rate. Red Man Syndrome.  2018-tolerated slow drip with benadryl pre-med-had no problems.   • Compazine Swelling       PHYSICAL EXAM  VITAL SIGNS: BP (!) 97/67   Pulse 96   Temp 36.6 °C (97.9 °F) (Temporal)   Resp 16   Ht 1.6 m (5' 3\")   Wt 72.6 kg (160 lb)   SpO2 96%   BMI 28.34 kg/m²     Constitutional: Well developed, Well nourished, No acute distress, Non-toxic appearance.   HENT: Normocephalic, Atraumatic, TMs normal, mucous membranes moist, no erythema, exudates, swelling, or masses, nares patent  Eyes: nonicteric, EOMI  Neck: Supple, no meningismus  Cardiovascular: Tachycardic with regular rhythm, no gallops rubs or murmurs  Lungs: Clear bilaterally, no wheezes/rales/rhonchi  Abdomen: Bowel sounds normal, Soft, No tenderness  Skin: Exposed areas of skin appear warm, dry and without rashes.  There is a large ecchymosis of her left bicep.  There is a 3 cm healing surgical scar over her right anterior chest with sutures in place.  Port is palpable subcutaneously over the left anterior chest.  There is no signs of infection around either site including no erythema, edema, wound " discharge/purulence.   Back: No tenderness  Genitalia: Deferred  Rectal: Deferred  Extremities: No edema  Neurologic: Alert, appropriate, follows commands, moving all extremities, normal speech   Psychiatric: Affect normal    DIAGNOSTIC STUDIES / PROCEDURES    LABS  Results for orders placed or performed during the hospital encounter of 02/07/20   VALPROIC ACID   Result Value Ref Range    Valproic Acid 99.3 50.0 - 100.0 ug/mL   CBC WITH DIFFERENTIAL   Result Value Ref Range    WBC 7.1 4.8 - 10.8 K/uL    RBC 4.97 4.20 - 5.40 M/uL    Hemoglobin 14.8 12.0 - 16.0 g/dL    Hematocrit 43.0 37.0 - 47.0 %    MCV 86.5 81.4 - 97.8 fL    MCH 29.8 27.0 - 33.0 pg    MCHC 34.4 33.6 - 35.0 g/dL    RDW 47.3 35.9 - 50.0 fL    Platelet Count 389 164 - 446 K/uL    MPV 10.1 9.0 - 12.9 fL    Neutrophils-Polys 78.00 (H) 44.00 - 72.00 %    Lymphocytes 14.60 (L) 22.00 - 41.00 %    Monocytes 6.60 0.00 - 13.40 %    Eosinophils 0.00 0.00 - 6.90 %    Basophils 0.40 0.00 - 1.80 %    Immature Granulocytes 0.40 0.00 - 0.90 %    Nucleated RBC 0.00 /100 WBC    Neutrophils (Absolute) 5.55 2.00 - 7.15 K/uL    Lymphs (Absolute) 1.04 1.00 - 4.80 K/uL    Monos (Absolute) 0.47 0.00 - 0.85 K/uL    Eos (Absolute) 0.00 0.00 - 0.51 K/uL    Baso (Absolute) 0.03 0.00 - 0.12 K/uL    Immature Granulocytes (abs) 0.03 0.00 - 0.11 K/uL    NRBC (Absolute) 0.00 K/uL   Comp Metabolic Panel   Result Value Ref Range    Sodium 137 135 - 145 mmol/L    Potassium 4.1 3.6 - 5.5 mmol/L    Chloride 107 96 - 112 mmol/L    Co2 18 (L) 20 - 33 mmol/L    Anion Gap 12.0 (H) 0.0 - 11.9    Glucose 94 65 - 99 mg/dL    Bun 9 8 - 22 mg/dL    Creatinine 0.65 0.50 - 1.40 mg/dL    Calcium 10.3 8.5 - 10.5 mg/dL    AST(SGOT) 12 12 - 45 U/L    ALT(SGPT) 40 2 - 50 U/L    Alkaline Phosphatase 127 (H) 30 - 99 U/L    Total Bilirubin 0.3 0.1 - 1.5 mg/dL    Albumin 5.0 (H) 3.2 - 4.9 g/dL    Total Protein 7.9 6.0 - 8.2 g/dL    Globulin 2.9 1.9 - 3.5 g/dL    A-G Ratio 1.7 g/dL   HCG QUAL SERUM   Result  Value Ref Range    Beta-Hcg Qualitative Serum Negative Negative   DIAGNOSTIC ALCOHOL   Result Value Ref Range    Diagnostic Alcohol 0.01 (H) 0.00 g/dL   TROPONIN   Result Value Ref Range    Troponin T 9 6 - 19 ng/L   D-DIMER   Result Value Ref Range    D-Dimer Screen <0.40 0.00 - 0.50 ug/mL (FEU)   ESTIMATED GFR   Result Value Ref Range    GFR If African American >60 >60 mL/min/1.73 m 2    GFR If Non African American >60 >60 mL/min/1.73 m 2   EKG (NOW)   Result Value Ref Range    Report       [facility] Emergency Dept.    Test Date:  2020  Pt Name:    BRIGID DAWSON                 Department: ER  MRN:        [id number]                      Room:       [facility]  Gender:     Female                       Technician: [id number]  :        1970                   Requested By:ROSALBA CANELA  Order #:    [id number]                    Reading MD:    Measurements  Intervals                                Axis  Rate:       121                          P:          67  DC:         120                          QRS:        18  QRSD:       74                           T:          47  QT:         312  QTc:        443    Interpretive Statements  SINUS TACHYCARDIA  Compared to ECG 2020 20:32:28  No significant changes         All labs reviewed by me.    EKG  Obtained upon arrival  Sinus tachycardia   Rate 102  Axis normal   Intervals normal   No ST segment elevation or depression.   Impression: Sinus tachycardia    EKG 15:22  Rate: 120  Rhythm: Sinus tach  Axis: Normal  Intervals: WNL  No ST segment changes  Impression: Sinus tachycardia  Interpreted by Dr. Canela     RADIOLOGY  No orders to display     The radiologist's interpretation of all radiological studies have been reviewed by me.    COURSE & MEDICAL DECISION MAKING  Nursing notes, VS, PMSFHx reviewed in chart.     2:47 PM Patient seen and examined at bedside.  49-year-old female with accidental ingestion of Depakote, 750 mg in total extra.  This was not  intentional she has no suicidal ideation.  In the ER she complains of tremors and migraine headache.  She is tachycardic to the 130s in the room EKG reveals a sinus tachycardia without ST segment elevation.  She endorses some muscular chest pain but denies specifically chest pain or shortness of breath.  In light of her tachycardia will obtain CBC, chemistry panel, troponin, d-dimer, urine drug screen, blood alcohol concentration and valproic acid level.  On exam she appears clinically dehydrated.  There is no signs of respiratory distress.  She saturating greater than 95% in room air.  For her headache we will treat her with 25 mg IV Benadryl, 15 mg IV Toradol and 5 mg of Reglan.  We will also bolused 2 L of NS.     4:30 PM CBC unremarkable. CMP with bicarb 18. Ammonia 54. Valproic acid level 47.4. ETOH 0.01. Trop unremarkable. D- dimer <0.40. B hCG neg.     4:56 PM HR improved to 102 after fluid bolus. Still complaining of pain but resting comfortably. Another 25mg IV Benadryl and 0.5 mg Dilaudid ordered. Will re-evaluate patient again for improvement.     5:30PM Still with headache, improved from 10/10 to 8/10. Fluid bolus still running.     6:40 PM Headache improved after 2nd dose of dilaudid. Pressure 97/67. Patient wanting to go home. Patient to be discharged home in stable condition.     Decision Making:  This is a 49 y.o. year old female who presents with accidental overdose of Depakote. No SI. Labs largely unremarkable. Sinus tachycardia on presentation and with migraine headache. Given fluid bolus for sinus tach as oral hydration not good option in setting of nausea. HR improved to 102. Migraine headache improved after medication. BP slightly decrease with pain medication administration but resolved with IV fluid hydration. Ultimately discharged home in stable condition with instructions to follow up with PCP next week.     6:46 PM-patient seen in concert with the resident physician.  Agree with assessment  and plan.  The patient has what appears to be her unremarkable Depakote level.  She does have some evidence of dehydration was given IV fluids here.  She was also treated for what was described by her as a typical migraine headache-I do not have a strong suspicion for subarachnoid hemorrhage.  The patient was tachycardic on arrival-that has resolved with IV fluids.  The patient will be discharged to follow-up with Dr. Power.  She will otherwise return for new or concerning symptoms in the interim.    8:14 PM-prior to patient's discharge she requested ketamine for her migraines.  She has requested this previously.  In review of her chart she has had numerous visits for migraine headaches and has seen neurology in regards to this.  I went ahead and ordered the low-dose ketamine protocol for pain.    FINAL IMPRESSION  1. Accidental drug overdose, initial encounter    2. Migraine without status migrainosus, not intractable, unspecified migraine type          I, Blake Guajardo (Scribe), am scribing for, and in the presence of, Edwardo Aiken M.D..    Electronically signed by: Blake Guajardo (Scribe), 2/7/2020    IEdwardo M.D. personally performed the services described in this documentation, as scribed by Blake Guajardo in my presence, and it is both accurate and complete.    The note accurately reflects work and decisions made by me.  Edwardo Aiken M.D.  2/7/2020  6:46 PM    History, physical, work-up and medical decision making made by myself, Gina Sheets, PGY 2 under the supervision of NU Ordonez.

## 2020-02-07 NOTE — ED TRIAGE NOTES
Chief Complaint   Patient presents with   • Drug Overdose     BIB EMS.  Per patient she took an extra dose of Depakote last night and this morning.  After am dose noted tremors.

## 2020-02-08 NOTE — ED NOTES
Discharge instructions and prescriptions discussed with pt. Pt verbalized understanding. Pt discharged ambulatory with .

## 2020-02-08 NOTE — ED NOTES
Assumed care of pt at this time. NS infusion infusing. Family at bedside. Pt reports she is still having pain and requesting ketamine. MD made aware.

## 2020-02-08 NOTE — ED NOTES
Waiting to receive ketamine from pharmacy at this time. Notified x[facility] that [facility] is out of stock.

## 2020-02-08 NOTE — DISCHARGE INSTRUCTIONS
Follow-up with Dr. Power.  Return for new or concerning symptoms in the interim to include recurrent headache vomiting or other concerns.

## 2020-02-09 LAB — EKG IMPRESSION: NORMAL

## 2020-02-18 ENCOUNTER — HOSPITAL ENCOUNTER (EMERGENCY)
Facility: MEDICAL CENTER | Age: 50
End: 2020-02-19
Attending: EMERGENCY MEDICINE
Payer: MEDICARE

## 2020-02-18 DIAGNOSIS — R51.9 SEVERE HEADACHE: ICD-10-CM

## 2020-02-18 PROCEDURE — 700105 HCHG RX REV CODE 258: Performed by: EMERGENCY MEDICINE

## 2020-02-18 PROCEDURE — 96375 TX/PRO/DX INJ NEW DRUG ADDON: CPT

## 2020-02-18 PROCEDURE — 96376 TX/PRO/DX INJ SAME DRUG ADON: CPT

## 2020-02-18 PROCEDURE — 700111 HCHG RX REV CODE 636 W/ 250 OVERRIDE (IP)

## 2020-02-18 PROCEDURE — 99285 EMERGENCY DEPT VISIT HI MDM: CPT

## 2020-02-18 PROCEDURE — 700111 HCHG RX REV CODE 636 W/ 250 OVERRIDE (IP): Performed by: EMERGENCY MEDICINE

## 2020-02-18 PROCEDURE — 96374 THER/PROPH/DIAG INJ IV PUSH: CPT

## 2020-02-18 PROCEDURE — 700101 HCHG RX REV CODE 250: Performed by: EMERGENCY MEDICINE

## 2020-02-18 RX ORDER — METOCLOPRAMIDE HYDROCHLORIDE 5 MG/ML
10 INJECTION INTRAMUSCULAR; INTRAVENOUS ONCE
Status: COMPLETED | OUTPATIENT
Start: 2020-02-18 | End: 2020-02-18

## 2020-02-18 RX ORDER — DIPHENHYDRAMINE HYDROCHLORIDE 50 MG/ML
25 INJECTION INTRAMUSCULAR; INTRAVENOUS ONCE
Status: COMPLETED | OUTPATIENT
Start: 2020-02-18 | End: 2020-02-18

## 2020-02-18 RX ORDER — HYDROMORPHONE HYDROCHLORIDE 1 MG/ML
1 INJECTION, SOLUTION INTRAMUSCULAR; INTRAVENOUS; SUBCUTANEOUS ONCE
Status: COMPLETED | OUTPATIENT
Start: 2020-02-18 | End: 2020-02-18

## 2020-02-18 RX ORDER — SODIUM CHLORIDE 9 MG/ML
1000 INJECTION, SOLUTION INTRAVENOUS ONCE
Status: COMPLETED | OUTPATIENT
Start: 2020-02-18 | End: 2020-02-19

## 2020-02-18 RX ADMIN — DIPHENHYDRAMINE HYDROCHLORIDE 25 MG: 50 INJECTION INTRAMUSCULAR; INTRAVENOUS at 23:24

## 2020-02-18 RX ADMIN — DIPHENHYDRAMINE HYDROCHLORIDE 25 MG: 50 INJECTION INTRAMUSCULAR; INTRAVENOUS at 21:03

## 2020-02-18 RX ADMIN — METOCLOPRAMIDE 10 MG: 5 INJECTION, SOLUTION INTRAMUSCULAR; INTRAVENOUS at 21:03

## 2020-02-18 RX ADMIN — SODIUM CHLORIDE 1000 ML: 9 INJECTION, SOLUTION INTRAVENOUS at 21:04

## 2020-02-18 RX ADMIN — HYDROMORPHONE HYDROCHLORIDE 1 MG: 1 INJECTION, SOLUTION INTRAMUSCULAR; INTRAVENOUS; SUBCUTANEOUS at 21:03

## 2020-02-18 RX ADMIN — HEPARIN 500 UNITS: 100 SYRINGE at 23:49

## 2020-02-18 RX ADMIN — KETAMINE HYDROCHLORIDE 25 MG: 10 INJECTION, SOLUTION INTRAMUSCULAR; INTRAVENOUS at 22:28

## 2020-02-19 VITALS
DIASTOLIC BLOOD PRESSURE: 65 MMHG | SYSTOLIC BLOOD PRESSURE: 107 MMHG | WEIGHT: 163.14 LBS | HEIGHT: 63 IN | TEMPERATURE: 98.2 F | HEART RATE: 87 BPM | OXYGEN SATURATION: 92 % | BODY MASS INDEX: 28.91 KG/M2 | RESPIRATION RATE: 16 BRPM

## 2020-02-19 NOTE — DISCHARGE INSTRUCTIONS
Go home and sleep in a dark quiet room to resolve the rest of the headache.  Return if your symptoms return.

## 2020-02-19 NOTE — ED NOTES
RN rounding. Pt reports increased HA and left arm numbness. Escorted to [facility] room for comfort.

## 2020-02-19 NOTE — ED NOTES
The patient has been provided with discharge education and information.  The patient was also provided with instructions on follow up care and return precautions.  The patient verbalizes understanding of discharge instructions, follow up care, and return precautions.  All questions have been answered.  NAD, A/Ox4, good color and appropriate at time of discharge.  Patient ambulated steady gait out of department.   is driving patient home.

## 2020-02-19 NOTE — ED PROVIDER NOTES
ED Provider  Scribed for Jonny Cali D.O. by Espinoza Hernandez. 2/18/2020  7:42 PM    Means of arrival:Walk-in  History obtained from:Patient  History limited by: None    CHIEF COMPLAINT  Chief Complaint   Patient presents with   • Headache       HPI  Enedelia Pang is a 49 y.o. female with a history of migraine headaches and  shunt insertion in 2017 who presents complaining of a sharp headache located behind her eyes that began three days ago and is similar to prior migraine headaches, although, more severe. She also complains of new numbness and tingling of her left upper extremity and describes heaviness and numbness of her left lower extremity. Patient complains of associated nausea, photosensitivity, and sound sensitivity. She denies fever, chills, rhinorrhea, congestion, or fever. The patient self administers IM Benadryl and Toradol injections at home, but denies any relief from her current migraine. She states her more severe migraines usually resolve after treatment with pain medications, IV fluids, and supplemental oxygen.    REVIEW OF SYSTEMS  See HPI for further details. All other systems are negative.     PAST MEDICAL HISTORY   has a past medical history of Allergy, unspecified not elsewhere classified, Anemia (10/05/2017), Anesthesia, Anxiety, Anxiety, generalized, Arthritis, autoimmune, Autoimmune disorder ([facility]), Back pain, Clostridium difficile diarrhea (2014), Depression, Elevated liver enzymes (2017), Fall, H/O Clostridium difficile infection (2014), MRSA infection (2003), Hydrocephalus ([facility]) (2015), Joint pain (09/10/2019), Migraine with aura, with intractable migraine, so stated, without mention of status migrainosus, MRSA (methicillin resistant Staphylococcus aureus) (08/2015), MRSA (methicillin resistant Staphylococcus aureus) (12/2017), MRSA (methicillin resistant Staphylococcus aureus) (2018), Pituitary abnormality ([facility]) (2002), Requires supplemental oxygen, Seizure ([facility]) (started  2017), Staph infection, and Stroke (HCC) (2007).    SOCIAL HISTORY  Social History     Tobacco Use   • Smoking status: Never Smoker   • Smokeless tobacco: Never Used   Substance and Sexual Activity   • Alcohol use: Not Currently     Frequency: Never     Binge frequency: Never   • Drug use: Never       SURGICAL HISTORY   has a past surgical history that includes tonsillectomy (1985); other neurological surg ([id number]); irrigation & debridement neuro (N/A, 6/28/2015); lumbar exploration (N/A, 8/31/2015); spinal cord stimulator (12/19/2016);  shunt insertion (5/31/2017); liver biopsy (07/24/2017); cholecystectomy (2001); appendectomy (1982); spinal cord stimulator (05/24/2018); spinal cord stimulator (2003); knee arthroscopy (Right, 1990); full thickness skin graft (Left, 04/2018); other surgical procedure (11/10/2017); other neurological surg (08/2015); endometrial ablation (2001); tubal coagulation laparoscopic bilateral (Bilateral, 2001); implant neurostim/ (9/13/2019); and other (Left, 01/23/2020).    CURRENT MEDICATIONS  No current facility-administered medications on file prior to encounter.      Current Outpatient Medications on File Prior to Encounter   Medication Sig Dispense Refill   • divalproex (DEPAKOTE) 250 MG Tablet Delayed Response Take 250 mg by mouth 2 Times a Day.     • divalproex (DEPAKOTE) 500 MG Tablet Delayed Response Take 1 Tab by mouth every bedtime. 90 Tab 3   • EMGALITY 120 MG/ML Solution Auto-injector Inject 120 mg as instructed Q30 DAYS.     • levETIRAcetam (KEPPRA) 500 MG Tab Take 500 mg by mouth 2 Times a Day.     • risperiDONE (RISPERDAL) 2 MG Tab Take 2 mg by mouth 2 times a day.     • aspirin (ASA) 81 MG Chew Tab chewable tablet Take 81 mg by mouth every morning.     • gabapentin (NEURONTIN) 600 MG tablet Take 600 mg by mouth 3 times a day.     • duloxetine (CYMBALTA) 60 MG CPEP delayed-release capsule Take 60 mg by mouth 2 times a day.         ALLERGIES  Allergies  "  Allergen Reactions   • Sumatriptan Anaphylaxis     Historical=\"Heart stops.\" Reaction  between 1995 to 1997   • Prochlorperazine Swelling     Tongue swelling. Reaction as a teen. (compazine)  Tolerated Phenergan on 02/24/15   • Vancomycin Shortness of Breath and Rash     Reaction in 2005.  D/W patient 8/31/15 - tolerated loading dose of vancomycin administered on 8/30/15 with some itching to chest with decreased infusion rate. Red Man Syndrome.  2018-tolerated slow drip with benadryl pre-med-had no problems.   • Compazine Swelling       PHYSICAL EXAM  VITAL SIGNS: /85   Pulse 98   Temp 37.6 °C (99.7 °F) (Temporal)   Resp 18   Ht 1.6 m (5' 3\")   Wt 74 kg (163 lb 2.3 oz)   SpO2 99%   BMI 28.90 kg/m²   Constitutional: Alert in no apparent distress. Speech is clear and concise  HENT: No signs of trauma, mucous membranes are moist  Eyes: Conjunctiva normal, Non-icteric.   Neck: Normal range of motion, No tenderness, Supple.  Lymphatic: No lymphadenopathy noted.   Cardiovascular: Regular rate and rhythm, no murmurs.   Thorax & Lungs: Normal breath sounds, No respiratory distress, No wheezing, No chest tenderness.   Abdomen: Bowel sounds normal, Soft, No tenderness, No masses, No pulsatile masses. No peritoneal signs.  Skin: Warm, Dry, normal color.   Back: No bony tenderness, No CVA tenderness.   Extremities: No edema, No tenderness, No cyanosis  Musculoskeletal: Good range of motion in all major joints. No tenderness to palpation or major deformities noted.   Neurologic: Awake, Alert and oriented x4, Speech is clear and concise, Cranial nerves II-XII intact, Strength upper extremities is normal. Mild decrease in sensation of left upper and lower extremities. No focal deficits noted.   Psychiatric: Affect normal, Judgment normal, Mood normal.       MEDICAL DECISION MAKING  This is a 49 y.o. female who presents with a severe headache.  She has a history of migraine headaches, history of  shunt.  The " headache pattern is similar to prior but it is full but worse than usual.  I discussed with the patient doing a CT.  She is declining at this time patient just had a CAT scan done to weeks ago and her symptoms are not much different than her regular migraine headaches.    She was medicated with Reglan, Benadryl, and Dilaudid at home without effect.  Ketamine infusion was done and her pain is significantly improved she is complaining of itching, there is no rash.  She will be given a dose of Benadryl for the itching but she feels she is well enough to go home and try to sleep to resolve the rest of the headache.    DIAGNOSTIC STUDIES / PROCEDURES    LABS  Results for orders placed or performed during the hospital encounter of 02/07/20   VALPROIC ACID   Result Value Ref Range    Valproic Acid 99.3 50.0 - 100.0 ug/mL   CBC WITH DIFFERENTIAL   Result Value Ref Range    WBC 7.1 4.8 - 10.8 K/uL    RBC 4.97 4.20 - 5.40 M/uL    Hemoglobin 14.8 12.0 - 16.0 g/dL    Hematocrit 43.0 37.0 - 47.0 %    MCV 86.5 81.4 - 97.8 fL    MCH 29.8 27.0 - 33.0 pg    MCHC 34.4 33.6 - 35.0 g/dL    RDW 47.3 35.9 - 50.0 fL    Platelet Count 389 164 - 446 K/uL    MPV 10.1 9.0 - 12.9 fL    Neutrophils-Polys 78.00 (H) 44.00 - 72.00 %    Lymphocytes 14.60 (L) 22.00 - 41.00 %    Monocytes 6.60 0.00 - 13.40 %    Eosinophils 0.00 0.00 - 6.90 %    Basophils 0.40 0.00 - 1.80 %    Immature Granulocytes 0.40 0.00 - 0.90 %    Nucleated RBC 0.00 /100 WBC    Neutrophils (Absolute) 5.55 2.00 - 7.15 K/uL    Lymphs (Absolute) 1.04 1.00 - 4.80 K/uL    Monos (Absolute) 0.47 0.00 - 0.85 K/uL    Eos (Absolute) 0.00 0.00 - 0.51 K/uL    Baso (Absolute) 0.03 0.00 - 0.12 K/uL    Immature Granulocytes (abs) 0.03 0.00 - 0.11 K/uL    NRBC (Absolute) 0.00 K/uL   Comp Metabolic Panel   Result Value Ref Range    Sodium 137 135 - 145 mmol/L    Potassium 4.1 3.6 - 5.5 mmol/L    Chloride 107 96 - 112 mmol/L    Co2 18 (L) 20 - 33 mmol/L    Anion Gap 12.0 (H) 0.0 - 11.9    Glucose  94 65 - 99 mg/dL    Bun 9 8 - 22 mg/dL    Creatinine 0.65 0.50 - 1.40 mg/dL    Calcium 10.3 8.5 - 10.5 mg/dL    AST(SGOT) 12 12 - 45 U/L    ALT(SGPT) 40 2 - 50 U/L    Alkaline Phosphatase 127 (H) 30 - 99 U/L    Total Bilirubin 0.3 0.1 - 1.5 mg/dL    Albumin 5.0 (H) 3.2 - 4.9 g/dL    Total Protein 7.9 6.0 - 8.2 g/dL    Globulin 2.9 1.9 - 3.5 g/dL    A-G Ratio 1.7 g/dL   HCG QUAL SERUM   Result Value Ref Range    Beta-Hcg Qualitative Serum Negative Negative   DIAGNOSTIC ALCOHOL   Result Value Ref Range    Diagnostic Alcohol 0.01 (H) 0.00 g/dL   TROPONIN   Result Value Ref Range    Troponin T 9 6 - 19 ng/L   D-DIMER   Result Value Ref Range    D-Dimer Screen <0.40 0.00 - 0.50 ug/mL (FEU)   ESTIMATED GFR   Result Value Ref Range    GFR If African American >60 >60 mL/min/1.73 m 2    GFR If Non African American >60 >60 mL/min/1.73 m 2   EKG (NOW)   Result Value Ref Range    Report       [facility] Emergency Dept.    Test Date:  2020  Pt Name:    BRIGID DAWSON                 Department: ER  MRN:        [id number]                      Room:       [facility]  Gender:     Female                       Technician: [id number]  :        1970                   Requested By:ROSALBA CANELA  Order #:    [id number]                    Reading MD: ROSALBA CANELA MD    Measurements  Intervals                                Axis  Rate:       121                          P:          67  WA:         120                          QRS:        18  QRSD:       74                           T:          47  QT:         312  QTc:        443    Interpretive Statements  SINUS TACHYCARDIA  Compared to ECG 2020 20:32:28  No significant changes  Electronically Signed On 2020 20:25:33 PST by ROSALBA CANELA MD         All labs reviewed by me.    RADIOLOGY  No orders to display     The radiologist's interpretations of all radiological studies have been reviewed by me.        COURSE  Pertinent Labs & Imaging studies reviewed. (See chart  for details)    Review of past medical records shows the patient was admitted six times in the last year for uncontrolled headache pain and was last admitted for headache on 11/28/2019.    7:42 PM - Patient seen and examined at bedside. Discussed plan of care. The patient will be resuscitated with 1L NS IV and medicated with metoclopramide 10 mg and hydromorphone 1 mg. HYDRATION: Based on the patient's presentation of migraine the patient was given IV fluids. IV Hydration was used because oral hydration was not adequate alone.    9:01 PM Ordered diphenhydramine 25 mg.    9:46 PM Patient was reevaluated at bedside. She reports only mild improvement of her headache. Ordered ketamine 25 mg in NS 50 ml to treat.    Upon recheck following hydration, the patient was significantly improved.        FINAL IMPRESSION  1. Severe headache         Espinoza MADERA (Scribe), am scribing for, and in the presence of, Jonny Cali D.O..    Electronically signed by: Espinoza Hernandez (Scribe), 2/18/2020    Jonny MADERA D.O. personally performed the services described in this documentation, as scribed by Espinoza Hernandez in my presence, and it is both accurate and complete.    C    The note accurately reflects work and decisions made by me.  Jonny Cali D.O.  2/18/2020  11:20 PM

## 2020-02-21 PROCEDURE — 99284 EMERGENCY DEPT VISIT MOD MDM: CPT

## 2020-02-21 PROCEDURE — 96374 THER/PROPH/DIAG INJ IV PUSH: CPT

## 2020-02-21 PROCEDURE — 96375 TX/PRO/DX INJ NEW DRUG ADDON: CPT

## 2020-02-22 ENCOUNTER — HOSPITAL ENCOUNTER (EMERGENCY)
Facility: MEDICAL CENTER | Age: 50
End: 2020-02-22
Attending: EMERGENCY MEDICINE
Payer: MEDICARE

## 2020-02-22 ENCOUNTER — HOSPITAL ENCOUNTER (INPATIENT)
Facility: MEDICAL CENTER | Age: 50
LOS: 2 days | DRG: 103 | End: 2020-02-24
Attending: EMERGENCY MEDICINE | Admitting: INTERNAL MEDICINE
Payer: MEDICARE

## 2020-02-22 VITALS
OXYGEN SATURATION: 99 % | SYSTOLIC BLOOD PRESSURE: 127 MMHG | TEMPERATURE: 98 F | DIASTOLIC BLOOD PRESSURE: 90 MMHG | BODY MASS INDEX: 29.57 KG/M2 | RESPIRATION RATE: 18 BRPM | WEIGHT: 166.89 LBS | HEART RATE: 85 BPM | HEIGHT: 63 IN

## 2020-02-22 DIAGNOSIS — R51.9 ACUTE INTRACTABLE HEADACHE, UNSPECIFIED HEADACHE TYPE: ICD-10-CM

## 2020-02-22 DIAGNOSIS — G89.29 CHRONIC NONINTRACTABLE HEADACHE, UNSPECIFIED HEADACHE TYPE: ICD-10-CM

## 2020-02-22 DIAGNOSIS — R51.9 CHRONIC NONINTRACTABLE HEADACHE, UNSPECIFIED HEADACHE TYPE: ICD-10-CM

## 2020-02-22 DIAGNOSIS — Z86.69 HISTORY OF MIGRAINE: ICD-10-CM

## 2020-02-22 LAB
ANION GAP SERPL CALC-SCNC: 11 MMOL/L (ref 0–11.9)
BASOPHILS # BLD AUTO: 0.3 % (ref 0–1.8)
BASOPHILS # BLD: 0.05 K/UL (ref 0–0.12)
BUN SERPL-MCNC: 7 MG/DL (ref 8–22)
CALCIUM SERPL-MCNC: 9.1 MG/DL (ref 8.5–10.5)
CHLORIDE SERPL-SCNC: 97 MMOL/L (ref 96–112)
CO2 SERPL-SCNC: 23 MMOL/L (ref 20–33)
CREAT SERPL-MCNC: 0.51 MG/DL (ref 0.5–1.4)
EOSINOPHIL # BLD AUTO: 0 K/UL (ref 0–0.51)
EOSINOPHIL NFR BLD: 0 % (ref 0–6.9)
ERYTHROCYTE [DISTWIDTH] IN BLOOD BY AUTOMATED COUNT: 47.4 FL (ref 35.9–50)
GLUCOSE SERPL-MCNC: 91 MG/DL (ref 65–99)
HCT VFR BLD AUTO: 37 % (ref 37–47)
HGB BLD-MCNC: 12 G/DL (ref 12–16)
IMM GRANULOCYTES # BLD AUTO: 0.09 K/UL (ref 0–0.11)
IMM GRANULOCYTES NFR BLD AUTO: 0.6 % (ref 0–0.9)
LYMPHOCYTES # BLD AUTO: 0.58 K/UL (ref 1–4.8)
LYMPHOCYTES NFR BLD: 3.6 % (ref 22–41)
MCH RBC QN AUTO: 28.9 PG (ref 27–33)
MCHC RBC AUTO-ENTMCNC: 32.4 G/DL (ref 33.6–35)
MCV RBC AUTO: 89.2 FL (ref 81.4–97.8)
MONOCYTES # BLD AUTO: 0.76 K/UL (ref 0–0.85)
MONOCYTES NFR BLD AUTO: 4.7 % (ref 0–13.4)
NEUTROPHILS # BLD AUTO: 14.59 K/UL (ref 2–7.15)
NEUTROPHILS NFR BLD: 90.8 % (ref 44–72)
NRBC # BLD AUTO: 0 K/UL
NRBC BLD-RTO: 0 /100 WBC
PLATELET # BLD AUTO: 289 K/UL (ref 164–446)
PMV BLD AUTO: 9.2 FL (ref 9–12.9)
POTASSIUM SERPL-SCNC: 3.9 MMOL/L (ref 3.6–5.5)
RBC # BLD AUTO: 4.15 M/UL (ref 4.2–5.4)
SODIUM SERPL-SCNC: 131 MMOL/L (ref 135–145)
VALPROATE SERPL-MCNC: 34.8 UG/ML (ref 50–100)
WBC # BLD AUTO: 16.1 K/UL (ref 4.8–10.8)

## 2020-02-22 PROCEDURE — 700105 HCHG RX REV CODE 258: Performed by: EMERGENCY MEDICINE

## 2020-02-22 PROCEDURE — 96375 TX/PRO/DX INJ NEW DRUG ADDON: CPT

## 2020-02-22 PROCEDURE — A9270 NON-COVERED ITEM OR SERVICE: HCPCS | Performed by: INTERNAL MEDICINE

## 2020-02-22 PROCEDURE — 80048 BASIC METABOLIC PNL TOTAL CA: CPT

## 2020-02-22 PROCEDURE — 99223 1ST HOSP IP/OBS HIGH 75: CPT | Performed by: INTERNAL MEDICINE

## 2020-02-22 PROCEDURE — 700102 HCHG RX REV CODE 250 W/ 637 OVERRIDE(OP): Performed by: INTERNAL MEDICINE

## 2020-02-22 PROCEDURE — 96376 TX/PRO/DX INJ SAME DRUG ADON: CPT

## 2020-02-22 PROCEDURE — 96365 THER/PROPH/DIAG IV INF INIT: CPT

## 2020-02-22 PROCEDURE — 99285 EMERGENCY DEPT VISIT HI MDM: CPT

## 2020-02-22 PROCEDURE — 80164 ASSAY DIPROPYLACETIC ACD TOT: CPT

## 2020-02-22 PROCEDURE — 700111 HCHG RX REV CODE 636 W/ 250 OVERRIDE (IP): Performed by: EMERGENCY MEDICINE

## 2020-02-22 PROCEDURE — 700111 HCHG RX REV CODE 636 W/ 250 OVERRIDE (IP)

## 2020-02-22 PROCEDURE — 700101 HCHG RX REV CODE 250: Performed by: EMERGENCY MEDICINE

## 2020-02-22 PROCEDURE — 96374 THER/PROPH/DIAG INJ IV PUSH: CPT

## 2020-02-22 PROCEDURE — 770006 HCHG ROOM/CARE - MED/SURG/GYN SEMI*

## 2020-02-22 PROCEDURE — 85025 COMPLETE CBC W/AUTO DIFF WBC: CPT

## 2020-02-22 PROCEDURE — 700105 HCHG RX REV CODE 258: Performed by: INTERNAL MEDICINE

## 2020-02-22 RX ORDER — DIPHENHYDRAMINE HYDROCHLORIDE 50 MG/ML
25 INJECTION INTRAMUSCULAR; INTRAVENOUS ONCE
Status: COMPLETED | OUTPATIENT
Start: 2020-02-22 | End: 2020-02-22

## 2020-02-22 RX ORDER — METOCLOPRAMIDE HYDROCHLORIDE 5 MG/ML
10 INJECTION INTRAMUSCULAR; INTRAVENOUS ONCE
Status: COMPLETED | OUTPATIENT
Start: 2020-02-22 | End: 2020-02-22

## 2020-02-22 RX ORDER — ENALAPRILAT 1.25 MG/ML
1.25 INJECTION INTRAVENOUS EVERY 6 HOURS PRN
Status: DISCONTINUED | OUTPATIENT
Start: 2020-02-22 | End: 2020-02-24 | Stop reason: HOSPADM

## 2020-02-22 RX ORDER — HYDROMORPHONE HYDROCHLORIDE 1 MG/ML
INJECTION, SOLUTION INTRAMUSCULAR; INTRAVENOUS; SUBCUTANEOUS
Status: COMPLETED
Start: 2020-02-22 | End: 2020-02-22

## 2020-02-22 RX ORDER — ONDANSETRON 2 MG/ML
4 INJECTION INTRAMUSCULAR; INTRAVENOUS EVERY 4 HOURS PRN
Status: DISCONTINUED | OUTPATIENT
Start: 2020-02-22 | End: 2020-02-23

## 2020-02-22 RX ORDER — AMITRIPTYLINE HYDROCHLORIDE 10 MG/1
20 TABLET, FILM COATED ORAL NIGHTLY
Status: DISCONTINUED | OUTPATIENT
Start: 2020-02-22 | End: 2020-02-24 | Stop reason: HOSPADM

## 2020-02-22 RX ORDER — ASPIRIN 81 MG/1
81 TABLET, CHEWABLE ORAL EVERY MORNING
Status: DISCONTINUED | OUTPATIENT
Start: 2020-02-23 | End: 2020-02-24 | Stop reason: HOSPADM

## 2020-02-22 RX ORDER — LEVETIRACETAM 500 MG/1
500 TABLET ORAL 2 TIMES DAILY
Status: DISCONTINUED | OUTPATIENT
Start: 2020-02-22 | End: 2020-02-23

## 2020-02-22 RX ORDER — AMITRIPTYLINE HYDROCHLORIDE 10 MG/1
20 TABLET, FILM COATED ORAL NIGHTLY
COMMUNITY
End: 2020-03-08

## 2020-02-22 RX ORDER — VALPROATE SODIUM 100 MG/ML
1000 INJECTION INTRAVENOUS ONCE
Status: COMPLETED | OUTPATIENT
Start: 2020-02-22 | End: 2020-02-22

## 2020-02-22 RX ORDER — ONDANSETRON 4 MG/1
4 TABLET, ORALLY DISINTEGRATING ORAL EVERY 4 HOURS PRN
Status: DISCONTINUED | OUTPATIENT
Start: 2020-02-22 | End: 2020-02-23

## 2020-02-22 RX ORDER — POLYETHYLENE GLYCOL 3350 17 G/17G
1 POWDER, FOR SOLUTION ORAL
Status: DISCONTINUED | OUTPATIENT
Start: 2020-02-22 | End: 2020-02-24 | Stop reason: HOSPADM

## 2020-02-22 RX ORDER — BUTALBITAL, ACETAMINOPHEN AND CAFFEINE 50; 325; 40 MG/1; MG/1; MG/1
1 TABLET ORAL EVERY 6 HOURS PRN
Status: DISCONTINUED | OUTPATIENT
Start: 2020-02-22 | End: 2020-02-24 | Stop reason: HOSPADM

## 2020-02-22 RX ORDER — DIVALPROEX SODIUM 250 MG/1
250 TABLET, DELAYED RELEASE ORAL
Status: DISCONTINUED | OUTPATIENT
Start: 2020-02-23 | End: 2020-02-23

## 2020-02-22 RX ORDER — SODIUM CHLORIDE 9 MG/ML
1000 INJECTION, SOLUTION INTRAVENOUS ONCE
Status: COMPLETED | OUTPATIENT
Start: 2020-02-22 | End: 2020-02-22

## 2020-02-22 RX ORDER — DIVALPROEX SODIUM 500 MG/1
250-500 TABLET, DELAYED RELEASE ORAL 3 TIMES DAILY
COMMUNITY
End: 2020-03-08

## 2020-02-22 RX ORDER — ACETAMINOPHEN 325 MG/1
650 TABLET ORAL EVERY 6 HOURS PRN
Status: DISCONTINUED | OUTPATIENT
Start: 2020-02-22 | End: 2020-02-24 | Stop reason: HOSPADM

## 2020-02-22 RX ORDER — HYDROMORPHONE HYDROCHLORIDE 1 MG/ML
1 INJECTION, SOLUTION INTRAMUSCULAR; INTRAVENOUS; SUBCUTANEOUS ONCE
Status: COMPLETED | OUTPATIENT
Start: 2020-02-22 | End: 2020-02-22

## 2020-02-22 RX ORDER — LORAZEPAM 2 MG/ML
0.5 INJECTION INTRAMUSCULAR EVERY 4 HOURS PRN
Status: DISCONTINUED | OUTPATIENT
Start: 2020-02-22 | End: 2020-02-24 | Stop reason: HOSPADM

## 2020-02-22 RX ORDER — AMOXICILLIN 250 MG
2 CAPSULE ORAL 2 TIMES DAILY
Status: DISCONTINUED | OUTPATIENT
Start: 2020-02-23 | End: 2020-02-24 | Stop reason: HOSPADM

## 2020-02-22 RX ORDER — DIPHENHYDRAMINE HYDROCHLORIDE 50 MG/ML
75 INJECTION INTRAMUSCULAR; INTRAVENOUS 3 TIMES DAILY PRN
COMMUNITY

## 2020-02-22 RX ORDER — KETOROLAC TROMETHAMINE 30 MG/ML
15 INJECTION, SOLUTION INTRAMUSCULAR; INTRAVENOUS ONCE
Status: COMPLETED | OUTPATIENT
Start: 2020-02-22 | End: 2020-02-22

## 2020-02-22 RX ORDER — KETOROLAC TROMETHAMINE 30 MG/ML
30 INJECTION, SOLUTION INTRAMUSCULAR; INTRAVENOUS EVERY 6 HOURS PRN
Status: DISCONTINUED | OUTPATIENT
Start: 2020-02-22 | End: 2020-02-24 | Stop reason: HOSPADM

## 2020-02-22 RX ORDER — DULOXETIN HYDROCHLORIDE 60 MG/1
60 CAPSULE, DELAYED RELEASE ORAL 2 TIMES DAILY
Status: DISCONTINUED | OUTPATIENT
Start: 2020-02-22 | End: 2020-02-24 | Stop reason: HOSPADM

## 2020-02-22 RX ORDER — SODIUM CHLORIDE 9 MG/ML
INJECTION, SOLUTION INTRAVENOUS CONTINUOUS
Status: DISCONTINUED | OUTPATIENT
Start: 2020-02-22 | End: 2020-02-23

## 2020-02-22 RX ORDER — DIVALPROEX SODIUM 500 MG/1
500 TABLET, DELAYED RELEASE ORAL EVERY EVENING
Status: DISCONTINUED | OUTPATIENT
Start: 2020-02-22 | End: 2020-02-23

## 2020-02-22 RX ORDER — DIPHENHYDRAMINE HYDROCHLORIDE 50 MG/ML
50 INJECTION INTRAMUSCULAR; INTRAVENOUS EVERY 6 HOURS PRN
Status: DISCONTINUED | OUTPATIENT
Start: 2020-02-22 | End: 2020-02-24 | Stop reason: HOSPADM

## 2020-02-22 RX ORDER — RISPERIDONE 0.5 MG/1
2 TABLET ORAL 2 TIMES DAILY
Status: DISCONTINUED | OUTPATIENT
Start: 2020-02-22 | End: 2020-02-24 | Stop reason: HOSPADM

## 2020-02-22 RX ORDER — DEXAMETHASONE SODIUM PHOSPHATE 4 MG/ML
12 INJECTION, SOLUTION INTRA-ARTICULAR; INTRALESIONAL; INTRAMUSCULAR; INTRAVENOUS; SOFT TISSUE ONCE
Status: COMPLETED | OUTPATIENT
Start: 2020-02-22 | End: 2020-02-22

## 2020-02-22 RX ORDER — GABAPENTIN 300 MG/1
600 CAPSULE ORAL EVERY 8 HOURS
Status: DISCONTINUED | OUTPATIENT
Start: 2020-02-22 | End: 2020-02-24 | Stop reason: HOSPADM

## 2020-02-22 RX ORDER — BISACODYL 10 MG
10 SUPPOSITORY, RECTAL RECTAL
Status: DISCONTINUED | OUTPATIENT
Start: 2020-02-22 | End: 2020-02-24 | Stop reason: HOSPADM

## 2020-02-22 RX ORDER — MAGNESIUM SULFATE HEPTAHYDRATE 40 MG/ML
2 INJECTION, SOLUTION INTRAVENOUS ONCE
Status: COMPLETED | OUTPATIENT
Start: 2020-02-22 | End: 2020-02-22

## 2020-02-22 RX ORDER — KETOROLAC TROMETHAMINE 30 MG/ML
60 INJECTION, SOLUTION INTRAMUSCULAR; INTRAVENOUS 3 TIMES DAILY PRN
Status: ON HOLD | COMMUNITY
End: 2021-06-17

## 2020-02-22 RX ADMIN — DIVALPROEX SODIUM 500 MG: 500 TABLET, DELAYED RELEASE ORAL at 23:34

## 2020-02-22 RX ADMIN — DIPHENHYDRAMINE HYDROCHLORIDE 25 MG: 50 INJECTION INTRAMUSCULAR; INTRAVENOUS at 17:17

## 2020-02-22 RX ADMIN — KETAMINE HYDROCHLORIDE 25 MG: 10 INJECTION, SOLUTION INTRAMUSCULAR; INTRAVENOUS at 17:53

## 2020-02-22 RX ADMIN — METOCLOPRAMIDE 10 MG: 5 INJECTION, SOLUTION INTRAMUSCULAR; INTRAVENOUS at 17:17

## 2020-02-22 RX ADMIN — DIPHENHYDRAMINE HYDROCHLORIDE 25 MG: 50 INJECTION INTRAMUSCULAR; INTRAVENOUS at 05:55

## 2020-02-22 RX ADMIN — DIPHENHYDRAMINE HYDROCHLORIDE 25 MG: 50 INJECTION INTRAMUSCULAR; INTRAVENOUS at 19:38

## 2020-02-22 RX ADMIN — SODIUM CHLORIDE 1000 ML: 9 INJECTION, SOLUTION INTRAVENOUS at 17:17

## 2020-02-22 RX ADMIN — DIPHENHYDRAMINE HYDROCHLORIDE 25 MG: 50 INJECTION INTRAMUSCULAR; INTRAVENOUS at 20:29

## 2020-02-22 RX ADMIN — LEVETIRACETAM 500 MG: 500 TABLET ORAL at 23:22

## 2020-02-22 RX ADMIN — HYDROMORPHONE HYDROCHLORIDE 1 MG: 1 INJECTION, SOLUTION INTRAMUSCULAR; INTRAVENOUS; SUBCUTANEOUS at 21:30

## 2020-02-22 RX ADMIN — HEPARIN 500 UNITS: 100 SYRINGE at 08:08

## 2020-02-22 RX ADMIN — METOCLOPRAMIDE 10 MG: 5 INJECTION, SOLUTION INTRAMUSCULAR; INTRAVENOUS at 05:55

## 2020-02-22 RX ADMIN — AMITRIPTYLINE HYDROCHLORIDE 20 MG: 10 TABLET, FILM COATED ORAL at 23:21

## 2020-02-22 RX ADMIN — RISPERIDONE 2 MG: 2 TABLET ORAL at 23:21

## 2020-02-22 RX ADMIN — SODIUM CHLORIDE: 9 INJECTION, SOLUTION INTRAVENOUS at 22:08

## 2020-02-22 RX ADMIN — SODIUM CHLORIDE 1000 ML: 9 INJECTION, SOLUTION INTRAVENOUS at 06:29

## 2020-02-22 RX ADMIN — VALPROATE SODIUM 1000 MG: 500 INJECTION INTRAVENOUS at 19:38

## 2020-02-22 RX ADMIN — HYDROMORPHONE HYDROCHLORIDE 1 MG: 1 INJECTION, SOLUTION INTRAMUSCULAR; INTRAVENOUS; SUBCUTANEOUS at 05:55

## 2020-02-22 RX ADMIN — DEXAMETHASONE SODIUM PHOSPHATE 12 MG: 4 INJECTION, SOLUTION INTRA-ARTICULAR; INTRALESIONAL; INTRAMUSCULAR; INTRAVENOUS; SOFT TISSUE at 05:55

## 2020-02-22 RX ADMIN — KETOROLAC TROMETHAMINE 15 MG: 30 INJECTION, SOLUTION INTRAMUSCULAR at 20:30

## 2020-02-22 RX ADMIN — KETAMINE HYDROCHLORIDE 25 MG: 10 INJECTION, SOLUTION INTRAMUSCULAR; INTRAVENOUS at 07:05

## 2020-02-22 RX ADMIN — DULOXETINE HYDROCHLORIDE 60 MG: 60 CAPSULE, DELAYED RELEASE ORAL at 23:21

## 2020-02-22 RX ADMIN — MAGNESIUM SULFATE 2 G: 2 INJECTION INTRAVENOUS at 18:43

## 2020-02-22 RX ADMIN — GABAPENTIN 600 MG: 300 CAPSULE ORAL at 23:22

## 2020-02-22 ASSESSMENT — LIFESTYLE VARIABLES
DOES PATIENT WANT TO STOP DRINKING: NO
TOTAL SCORE: 0
EVER HAD A DRINK FIRST THING IN THE MORNING TO STEADY YOUR NERVES TO GET RID OF A HANGOVER: NO
HAVE YOU EVER FELT YOU SHOULD CUT DOWN ON YOUR DRINKING: NO
ON A TYPICAL DAY WHEN YOU DRINK ALCOHOL HOW MANY DRINKS DO YOU HAVE: 0
CONSUMPTION TOTAL: INCOMPLETE
DOES PATIENT WANT TO STOP DRINKING: NO
DO YOU DRINK ALCOHOL: NO
EVER FELT BAD OR GUILTY ABOUT YOUR DRINKING: NO
HOW MANY TIMES IN THE PAST YEAR HAVE YOU HAD 5 OR MORE DRINKS IN A DAY: 0
AVERAGE NUMBER OF DAYS PER WEEK YOU HAVE A DRINK CONTAINING ALCOHOL: 0
TOTAL SCORE: 0
EVER FELT BAD OR GUILTY ABOUT YOUR DRINKING: NO
CONSUMPTION TOTAL: INCOMPLETE
TOTAL SCORE: 0
TOTAL SCORE: 0
CONSUMPTION TOTAL: INCOMPLETE
DOES PATIENT WANT TO STOP DRINKING: NO
HAVE PEOPLE ANNOYED YOU BY CRITICIZING YOUR DRINKING: NO
EVER_SMOKED: NEVER
DO YOU DRINK ALCOHOL: NO
HAVE YOU EVER FELT YOU SHOULD CUT DOWN ON YOUR DRINKING: NO
EVER HAD A DRINK FIRST THING IN THE MORNING TO STEADY YOUR NERVES TO GET RID OF A HANGOVER: NO
HAVE PEOPLE ANNOYED YOU BY CRITICIZING YOUR DRINKING: NO

## 2020-02-22 ASSESSMENT — ENCOUNTER SYMPTOMS
TINGLING: 0
CONSTIPATION: 0
FALLS: 0
SPUTUM PRODUCTION: 0
WEAKNESS: 0
PALPITATIONS: 0
DIZZINESS: 0
HEADACHES: 1
LOSS OF CONSCIOUSNESS: 0
STRIDOR: 0
CHILLS: 0
ABDOMINAL PAIN: 0
SHORTNESS OF BREATH: 0
COUGH: 0
NAUSEA: 1
MYALGIAS: 0
DIARRHEA: 0
DEPRESSION: 0
FEVER: 0
VOMITING: 1

## 2020-02-22 NOTE — ED TRIAGE NOTES
Chief Complaint   Patient presents with   • Migraine     x 6 days, pt endorses taking benadryl and toradol without relief. pt was seen here yesterday for the same.        Pt ambulatory to triage for above complaint.     Pt is alert/oriented and follows commands. Pt speaking in full sentences and responds appropriately to questions. No acute distress noted in triage and respirations are even and unlabored.     Pt placed in lobby and educated on triage process. Pt encouraged to alert staff for any changes in condition.

## 2020-02-22 NOTE — DISCHARGE INSTRUCTIONS
Follow-up with primary care next week for reevaluation, medication management close blood pressure monitoring.    Continue home medications as previously indicated.    Activity as tolerated.    Return to the emergency department for intractable headache, altered mental status, visual changes, slurred speech, focal weakness, paresthesias, fever, vomiting or other new concerns.

## 2020-02-22 NOTE — ED NOTES
RN rounded on patient in waiting room, no change in symptoms, updated on POC, apologized for long waits

## 2020-02-22 NOTE — ED PROVIDER NOTES
"ED Provider Note    CHIEF COMPLAINT  Chief Complaint   Patient presents with   • Migraine     history of, has been taking all of her preventatives, has taken bendaryl and toradol today without relief.        HPI  Enedelia Pang is a 49 y.o. female who presents to the emergency department through triage with her  for headache.  Patient with history of migraines, chronic and recurring, patient states almost every day of the month.  Patient states she requires ED evaluation at least once monthly for intractable headaches.  Home injectable Benadryl and Toradol without relief this time this headache however, constant for 8 days, 9 out of 10 at this time, behind her eyes.  Patient states only change in this headache from baseline is a \"intensity\" which is normally what prompts her evaluation.  No new presentation or symptoms otherwise.  No visual changes although patient describes photophobia.  No slurred speech, focal weakness or paresthesias.  Baseline mentation and behavior per .  Nausea, vomiting therefore decreased appetite.  No fever or chills.  No neck pain or stiffness.  No syncope.    History of chronic migraines,  shunt, seizure, stroke, hydrocephalus.    REVIEW OF SYSTEMS  See HPI for further details. All other systems are negative.    PAST MEDICAL HISTORY   has a past medical history of Allergy, unspecified not elsewhere classified, Anemia (10/05/2017), Anesthesia, Anxiety, Anxiety, generalized, Arthritis, autoimmune, Autoimmune disorder (HCC), Back pain, Clostridium difficile diarrhea (2014), Depression, Elevated liver enzymes (2017), Fall, H/O Clostridium difficile infection (2014), MRSA infection (2003), Hydrocephalus ([facility]) (2015), Joint pain (09/10/2019), Migraine with aura, with intractable migraine, so stated, without mention of status migrainosus, MRSA (methicillin resistant Staphylococcus aureus) (08/2015), MRSA (methicillin resistant Staphylococcus aureus) (12/2017), MRSA " "(methicillin resistant Staphylococcus aureus) (2018), Pituitary abnormality (HCC) (2002), Requires supplemental oxygen, Seizure (HCC) (started 2017), Staph infection, and Stroke ([facility]) (2007).    SOCIAL HISTORY  Social History     Tobacco Use   • Smoking status: Never Smoker   • Smokeless tobacco: Never Used   Substance and Sexual Activity   • Alcohol use: Not Currently     Frequency: Never     Binge frequency: Never   • Drug use: Never   • Sexual activity: Not on file       SURGICAL HISTORY   has a past surgical history that includes tonsillectomy (1985); other neurological surg ([id number]); irrigation & debridement neuro (N/A, 6/28/2015); lumbar exploration (N/A, 8/31/2015); spinal cord stimulator (12/19/2016);  shunt insertion (5/31/2017); liver biopsy (07/24/2017); cholecystectomy (2001); appendectomy (1982); spinal cord stimulator (05/24/2018); spinal cord stimulator (2003); knee arthroscopy (Right, 1990); full thickness skin graft (Left, 04/2018); other surgical procedure (11/10/2017); other neurological surg (08/2015); endometrial ablation (2001); tubal coagulation laparoscopic bilateral (Bilateral, 2001); implant neurostim/ (9/13/2019); and other (Left, 01/23/2020).    CURRENT MEDICATIONS  Home Medications     Reviewed by Blas Casanova R.N. (Registered Nurse) on 02/21/20 at 2357  Med List Status: <None>   Medication Last Dose Status   aspirin (ASA) 81 MG Chew Tab chewable tablet  Active   divalproex (DEPAKOTE) 250 MG Tablet Delayed Response  Active   divalproex (DEPAKOTE) 500 MG Tablet Delayed Response  Active   duloxetine (CYMBALTA) 60 MG CPEP delayed-release capsule  Active   EMGALITY 120 MG/ML Solution Auto-injector  Active   gabapentin (NEURONTIN) 600 MG tablet  Active   levETIRAcetam (KEPPRA) 500 MG Tab  Active   risperiDONE (RISPERDAL) 2 MG Tab  Active                ALLERGIES  Allergies   Allergen Reactions   • Sumatriptan Anaphylaxis     Historical=\"Heart stops.\" Reaction  between " "1995 to 1997   • Prochlorperazine Swelling     Tongue swelling. Reaction as a teen. (compazine)  Tolerated Phenergan on 02/24/15   • Vancomycin Shortness of Breath and Rash     Reaction in 2005.  D/W patient 8/31/15 - tolerated loading dose of vancomycin administered on 8/30/15 with some itching to chest with decreased infusion rate. Red Man Syndrome.  2018-tolerated slow drip with benadryl pre-med-had no problems.   • Compazine Swelling       PHYSICAL EXAM  VITAL SIGNS: /90   Pulse 85   Temp 36.7 °C (98 °F) (Temporal)   Resp 18   Ht 1.6 m (5' 3\")   Wt 75.7 kg (166 lb 14.2 oz)   SpO2 99%   BMI 29.56 kg/m²   Pulse ox interpretation: I interpret this pulse ox as normal.  Constitutional: Alert in no apparent distress.  HENT: Normocephalic, atraumatic. Bilateral external ears normal, Nose normal. Moist mucous membranes.    Eyes: Pupils are equal and reactive, 5-3 bilaterally.  Conjunctiva normal.  EOMI.  No nystagmus.  Neck: Normal range of motion, Supple.  No meningeal irritation.  Lymphatic: No lymphadenopathy noted.   Cardiovascular: Regular rate and rhythm, no murmurs. Distal pulses intact.   Thorax & Lungs: Normal breath sounds.  No wheezing/rales/ronchi. No increased work of breathing  Abdomen: Soft, non-distended, non-tender to palpation.   Skin: Warm, Dry, No erythema, No rash.   Musculoskeletal: Good range of motion in all major joints.   Neurologic: Alert and oriented x4.  Speech clear and cohesive.  Cranial nerves II through XII intact bilaterally.  5 out of 5 strength in 4 extremities with proximal distal muscle groups.  No pronator drift.  Psychiatric: Flat affect.  Cooperative.    COURSE & MEDICAL DECISION MAKING  Nursing notes and vital signs were reviewed. (See chart for details)  The patients records were reviewed, history was obtained from the patient;     6:33 AM patient reevaluated at bedside.  Patient received Reglan, Benadryl, Decadron and Dilaudid for headache.  Now 8 out of 10.  She " "appears to be resting comfortably but states she is still quite uncomfortable.  Patient has had ketamine infusions previously and states \"that normally works well for me,\" will add now.    7:41 AM patient reevaluated at bedside.  Discomfort much improved after ketamine infusion.  Tolerating oral fluids.  Ambulates independently.    Patient has history for chronic migraines with multiple similar presentations such as today, does not describe any change in symptomatology.  History of  shunt, seizure, stroke, hydrocephalus although no clinical evidence for such today.  No indication for further evaluation given patient's significant improvement with ED interventions.    Patient is stable for discharge at this time, anticipatory guidance provided, continue home medications, close follow-up is encouraged, and strict ED return instructions have been detailed. Patient is agreeable to the disposition and plan.    Patient's blood pressure was elevated in the emergency department, and has been referred to primary care for close monitoring.    FINAL IMPRESSION  (R51) Chronic nonintractable headache, unspecified headache type  (Z86.69) History of migraine      Electronically signed by: Lluvia George D.O., 2/22/2020 5:21 AM      This dictation was created using voice recognition software. The accuracy of the dictation is limited to the abilities of the software. I expect there may be some errors of grammar and possibly content. The nursing notes were reviewed and certain aspects of this information were incorporated into this note.        "

## 2020-02-22 NOTE — ED TRIAGE NOTES
"Enedelia Ramesh Pang   49 y.o. female   Chief Complaint   Patient presents with   • Migraine     history of, has been taking all of her preventatives, has taken bendaryl and toradol today without relief.       Pt amb to triage with steady gait for above complaint.      Pt is alert and oriented, speaking in full sentences, follows commands and responds appropriately to questions. NAD. Resp are even and unlabored.   Pt placed in lobby. Pt educated on triage process. Pt encouraged to alert staff for any changes.    /83   Pulse 94   Temp 36.2 °C (97.2 °F) (Oral)   Resp 18   Ht 1.6 m (5' 3\")   Wt 75.7 kg (166 lb 14.2 oz)   SpO2 99%   BMI 29.56 kg/m²     "

## 2020-02-22 NOTE — ED NOTES
Patient awake alert and oriented x 4, [place] 15, bed in low position, call light within reach, on room air, unlabored breathing noted, no cough noted, interacts with staff, interactions noted as appropriate, complains of head pain, lights dimmed for comfort.

## 2020-02-23 LAB
ANION GAP SERPL CALC-SCNC: 8 MMOL/L (ref 0–11.9)
BUN SERPL-MCNC: 6 MG/DL (ref 8–22)
CALCIUM SERPL-MCNC: 8.4 MG/DL (ref 8.5–10.5)
CHLORIDE SERPL-SCNC: 103 MMOL/L (ref 96–112)
CO2 SERPL-SCNC: 25 MMOL/L (ref 20–33)
CREAT SERPL-MCNC: 0.51 MG/DL (ref 0.5–1.4)
ERYTHROCYTE [DISTWIDTH] IN BLOOD BY AUTOMATED COUNT: 48.2 FL (ref 35.9–50)
GLUCOSE SERPL-MCNC: 94 MG/DL (ref 65–99)
HCT VFR BLD AUTO: 33.6 % (ref 37–47)
HGB BLD-MCNC: 11 G/DL (ref 12–16)
MAGNESIUM SERPL-MCNC: 2 MG/DL (ref 1.5–2.5)
MCH RBC QN AUTO: 29.2 PG (ref 27–33)
MCHC RBC AUTO-ENTMCNC: 32.7 G/DL (ref 33.6–35)
MCV RBC AUTO: 89.1 FL (ref 81.4–97.8)
PLATELET # BLD AUTO: 219 K/UL (ref 164–446)
PMV BLD AUTO: 9.3 FL (ref 9–12.9)
POTASSIUM SERPL-SCNC: 3.6 MMOL/L (ref 3.6–5.5)
RBC # BLD AUTO: 3.77 M/UL (ref 4.2–5.4)
SODIUM SERPL-SCNC: 136 MMOL/L (ref 135–145)
WBC # BLD AUTO: 9.5 K/UL (ref 4.8–10.8)

## 2020-02-23 PROCEDURE — A9270 NON-COVERED ITEM OR SERVICE: HCPCS | Performed by: INTERNAL MEDICINE

## 2020-02-23 PROCEDURE — 80048 BASIC METABOLIC PNL TOTAL CA: CPT

## 2020-02-23 PROCEDURE — 83735 ASSAY OF MAGNESIUM: CPT

## 2020-02-23 PROCEDURE — 700105 HCHG RX REV CODE 258: Performed by: INTERNAL MEDICINE

## 2020-02-23 PROCEDURE — 700105 HCHG RX REV CODE 258: Performed by: HOSPITALIST

## 2020-02-23 PROCEDURE — 770006 HCHG ROOM/CARE - MED/SURG/GYN SEMI*

## 2020-02-23 PROCEDURE — 85027 COMPLETE CBC AUTOMATED: CPT

## 2020-02-23 PROCEDURE — 700111 HCHG RX REV CODE 636 W/ 250 OVERRIDE (IP): Performed by: INTERNAL MEDICINE

## 2020-02-23 PROCEDURE — 99232 SBSQ HOSP IP/OBS MODERATE 35: CPT | Performed by: HOSPITALIST

## 2020-02-23 PROCEDURE — A9270 NON-COVERED ITEM OR SERVICE: HCPCS | Performed by: HOSPITALIST

## 2020-02-23 PROCEDURE — 99223 1ST HOSP IP/OBS HIGH 75: CPT | Performed by: PSYCHIATRY & NEUROLOGY

## 2020-02-23 PROCEDURE — 700102 HCHG RX REV CODE 250 W/ 637 OVERRIDE(OP): Performed by: INTERNAL MEDICINE

## 2020-02-23 PROCEDURE — 700102 HCHG RX REV CODE 250 W/ 637 OVERRIDE(OP): Performed by: HOSPITALIST

## 2020-02-23 PROCEDURE — 700101 HCHG RX REV CODE 250: Performed by: HOSPITALIST

## 2020-02-23 RX ORDER — DIVALPROEX SODIUM 500 MG/1
500 TABLET, DELAYED RELEASE ORAL
Status: DISCONTINUED | OUTPATIENT
Start: 2020-02-23 | End: 2020-02-23

## 2020-02-23 RX ORDER — DIVALPROEX SODIUM 500 MG/1
500 TABLET, DELAYED RELEASE ORAL EVERY 8 HOURS
Status: DISCONTINUED | OUTPATIENT
Start: 2020-02-23 | End: 2020-02-24 | Stop reason: HOSPADM

## 2020-02-23 RX ADMIN — KETOROLAC TROMETHAMINE 30 MG: 30 INJECTION, SOLUTION INTRAMUSCULAR at 02:55

## 2020-02-23 RX ADMIN — KETOROLAC TROMETHAMINE 30 MG: 30 INJECTION, SOLUTION INTRAMUSCULAR at 09:12

## 2020-02-23 RX ADMIN — ASPIRIN 81 MG 81 MG: 81 TABLET ORAL at 04:50

## 2020-02-23 RX ADMIN — LEVETIRACETAM 750 MG: 500 TABLET ORAL at 17:12

## 2020-02-23 RX ADMIN — DIPHENHYDRAMINE HYDROCHLORIDE 50 MG: 50 INJECTION INTRAMUSCULAR; INTRAVENOUS at 09:12

## 2020-02-23 RX ADMIN — DULOXETINE HYDROCHLORIDE 60 MG: 60 CAPSULE, DELAYED RELEASE ORAL at 17:12

## 2020-02-23 RX ADMIN — RISPERIDONE 2 MG: 2 TABLET ORAL at 04:46

## 2020-02-23 RX ADMIN — DIPHENHYDRAMINE HYDROCHLORIDE 50 MG: 50 INJECTION INTRAMUSCULAR; INTRAVENOUS at 02:52

## 2020-02-23 RX ADMIN — CAFFEINE AND SODIUM BENZOATE 500 MG: 125 INJECTION, SOLUTION INTRAMUSCULAR; INTRAVENOUS at 18:13

## 2020-02-23 RX ADMIN — KETOROLAC TROMETHAMINE 30 MG: 30 INJECTION, SOLUTION INTRAMUSCULAR at 15:15

## 2020-02-23 RX ADMIN — KETOROLAC TROMETHAMINE 30 MG: 30 INJECTION, SOLUTION INTRAMUSCULAR at 21:06

## 2020-02-23 RX ADMIN — AMITRIPTYLINE HYDROCHLORIDE 20 MG: 10 TABLET, FILM COATED ORAL at 21:16

## 2020-02-23 RX ADMIN — LORAZEPAM 0.5 MG: 2 INJECTION INTRAMUSCULAR; INTRAVENOUS at 22:23

## 2020-02-23 RX ADMIN — DIVALPROEX SODIUM 250 MG: 250 TABLET, DELAYED RELEASE ORAL at 04:48

## 2020-02-23 RX ADMIN — DULOXETINE HYDROCHLORIDE 60 MG: 60 CAPSULE, DELAYED RELEASE ORAL at 04:47

## 2020-02-23 RX ADMIN — DIVALPROEX SODIUM 500 MG: 500 TABLET, DELAYED RELEASE ORAL at 21:06

## 2020-02-23 RX ADMIN — BUTALBITAL, ACETAMINOPHEN, AND CAFFEINE 1 TABLET: 50; 325; 40 TABLET ORAL at 10:47

## 2020-02-23 RX ADMIN — DIPHENHYDRAMINE HYDROCHLORIDE 50 MG: 50 INJECTION INTRAMUSCULAR; INTRAVENOUS at 15:15

## 2020-02-23 RX ADMIN — BUTALBITAL, ACETAMINOPHEN, AND CAFFEINE 1 TABLET: 50; 325; 40 TABLET ORAL at 23:15

## 2020-02-23 RX ADMIN — BUTALBITAL, ACETAMINOPHEN, AND CAFFEINE 1 TABLET: 50; 325; 40 TABLET ORAL at 04:53

## 2020-02-23 RX ADMIN — RISPERIDONE 2 MG: 2 TABLET ORAL at 17:12

## 2020-02-23 RX ADMIN — LEVETIRACETAM 500 MG: 500 TABLET ORAL at 04:49

## 2020-02-23 RX ADMIN — DIPHENHYDRAMINE HYDROCHLORIDE 50 MG: 50 INJECTION INTRAMUSCULAR; INTRAVENOUS at 21:06

## 2020-02-23 RX ADMIN — ENOXAPARIN SODIUM 40 MG: 100 INJECTION SUBCUTANEOUS at 04:50

## 2020-02-23 RX ADMIN — GABAPENTIN 600 MG: 300 CAPSULE ORAL at 13:20

## 2020-02-23 RX ADMIN — GABAPENTIN 600 MG: 300 CAPSULE ORAL at 21:06

## 2020-02-23 RX ADMIN — DIVALPROEX SODIUM 500 MG: 500 TABLET, DELAYED RELEASE ORAL at 13:20

## 2020-02-23 RX ADMIN — SODIUM CHLORIDE: 9 INJECTION, SOLUTION INTRAVENOUS at 08:25

## 2020-02-23 RX ADMIN — GABAPENTIN 600 MG: 300 CAPSULE ORAL at 04:47

## 2020-02-23 RX ADMIN — BUTALBITAL, ACETAMINOPHEN, AND CAFFEINE 1 TABLET: 50; 325; 40 TABLET ORAL at 17:12

## 2020-02-23 ASSESSMENT — COGNITIVE AND FUNCTIONAL STATUS - GENERAL
SUGGESTED CMS G CODE MODIFIER DAILY ACTIVITY: CK
TURNING FROM BACK TO SIDE WHILE IN FLAT BAD: A LITTLE
SUGGESTED CMS G CODE MODIFIER MOBILITY: CK
DRESSING REGULAR UPPER BODY CLOTHING: A LITTLE
MOBILITY SCORE: 18
STANDING UP FROM CHAIR USING ARMS: A LITTLE
CLIMB 3 TO 5 STEPS WITH RAILING: A LITTLE
DAILY ACTIVITIY SCORE: 18
PERSONAL GROOMING: A LITTLE
TOILETING: A LITTLE
DRESSING REGULAR LOWER BODY CLOTHING: A LITTLE
WALKING IN HOSPITAL ROOM: A LITTLE
MOVING FROM LYING ON BACK TO SITTING ON SIDE OF FLAT BED: A LITTLE
HELP NEEDED FOR BATHING: A LITTLE
MOVING TO AND FROM BED TO CHAIR: A LITTLE
EATING MEALS: A LITTLE

## 2020-02-23 ASSESSMENT — PATIENT HEALTH QUESTIONNAIRE - PHQ9
7. TROUBLE CONCENTRATING ON THINGS, SUCH AS READING THE NEWSPAPER OR WATCHING TELEVISION: MORE THAN HALF THE DAYS
2. FEELING DOWN, DEPRESSED, IRRITABLE, OR HOPELESS: NOT AT ALL
9. THOUGHTS THAT YOU WOULD BE BETTER OFF DEAD, OR OF HURTING YOURSELF: NOT AT ALL
8. MOVING OR SPEAKING SO SLOWLY THAT OTHER PEOPLE COULD HAVE NOTICED. OR THE OPPOSITE, BEING SO FIGETY OR RESTLESS THAT YOU HAVE BEEN MOVING AROUND A LOT MORE THAN USUAL: SEVERAL DAYS
5. POOR APPETITE OR OVEREATING: SEVERAL DAYS
1. LITTLE INTEREST OR PLEASURE IN DOING THINGS: NOT AT ALL
SUM OF ALL RESPONSES TO PHQ QUESTIONS 1-9: 7
2. FEELING DOWN, DEPRESSED, IRRITABLE, OR HOPELESS: NOT AT ALL
3. TROUBLE FALLING OR STAYING ASLEEP OR SLEEPING TOO MUCH: SEVERAL DAYS
SUM OF ALL RESPONSES TO PHQ9 QUESTIONS 1 AND 2: 0
1. LITTLE INTEREST OR PLEASURE IN DOING THINGS: NOT AT ALL
6. FEELING BAD ABOUT YOURSELF - OR THAT YOU ARE A FAILURE OR HAVE LET YOURSELF OR YOUR FAMILY DOWN: NOT AL ALL
4. FEELING TIRED OR HAVING LITTLE ENERGY: MORE THAN HALF THE DAYS
SUM OF ALL RESPONSES TO PHQ9 QUESTIONS 1 AND 2: 0

## 2020-02-23 ASSESSMENT — ENCOUNTER SYMPTOMS
WEAKNESS: 0
PALPITATIONS: 0
CHILLS: 0
VOMITING: 1
SPUTUM PRODUCTION: 0
HEADACHES: 1
NAUSEA: 1
TINGLING: 0
DEPRESSION: 0
FALLS: 0
FEVER: 0
CONSTIPATION: 0
DIZZINESS: 0
LOSS OF CONSCIOUSNESS: 0
SHORTNESS OF BREATH: 0
DIARRHEA: 0
ABDOMINAL PAIN: 0
STRIDOR: 0
COUGH: 0
MYALGIAS: 0

## 2020-02-23 NOTE — ED PROVIDER NOTES
"ED Provider Note    Scribed for Isa Wolf M.D. by Anna Sears. 2/22/2020  4:27 PM    Means of arrival: Walk-in  History obtained from: Patient  History limited by: None      CHIEF COMPLAINT  Chief Complaint   Patient presents with   • Migraine     x 6 days, pt endorses taking benadryl and toradol without relief. pt was seen here yesterday for the same.        SURI Pang is a 49 y.o. female with a history of chronic migraines, hydrocephalus status post  shunt, autoimmune cerebritis, seizure disorder on Keppra who presents to the Emergency Department for evaluation of a migraine onset 6 days ago. She describes symptoms as localized to bilateral temple regions, but worse on the right which she describes as \"numb and heavy.\" She is negative for fever.  She endorses photophobia, nausea without vomiting.  No significant neck pain or vision changes.  No numbness or weakness to bilateral lower extremities.  Patient was seen here this morning for the same symptoms and given migraine cocktail, ketamine, and dexamethasone. At bedside, she states he current symptoms are \"more intense\". The patient took Benadryl and Toradol for her symptoms four hours prior to arrival without relief.     Review of past medical records shows the patient was admitted for complex migraine on 1/28/2020  Patient had CT and CTA imaging on 01/28/2020 that were revealed to be normal. She was seen again on 2/18/2020 for headache and again just this morning.. Patient has a history of multiple ED visits secondary to chronic migraines. The patient is currently between neurologists.    REVIEW OF SYSTEMS  Pertinent positive include migraine. Pertinent negative include fever. All other systems reviewed and are negative.      PAST MEDICAL HISTORY   has a past medical history of Allergy, unspecified not elsewhere classified, Anemia (10/05/2017), Anesthesia, Anxiety, Anxiety, generalized, Arthritis, autoimmune, Autoimmune disorder " ([facility]), Back pain, Clostridium difficile diarrhea (2014), Depression, Elevated liver enzymes (2017), Fall, H/O Clostridium difficile infection (2014), MRSA infection (2003), Hydrocephalus ([facility]) (2015), Joint pain (09/10/2019), Migraine with aura, with intractable migraine, so stated, without mention of status migrainosus, MRSA (methicillin resistant Staphylococcus aureus) (08/2015), MRSA (methicillin resistant Staphylococcus aureus) (12/2017), MRSA (methicillin resistant Staphylococcus aureus) (2018), Pituitary abnormality ([facility]) (2002), Requires supplemental oxygen, Seizure ([facility]) (started 2017), Staph infection, and Stroke ([facility]) (2007).    SOCIAL HISTORY  Social History     Tobacco Use   • Smoking status: Never Smoker   • Smokeless tobacco: Never Used   Substance and Sexual Activity   • Alcohol use: Not Currently     Frequency: Never     Binge frequency: Never   • Drug use: Never       SURGICAL HISTORY   has a past surgical history that includes tonsillectomy (1985); other neurological surg (1959-1101); irrigation & debridement neuro (N/A, 6/28/2015); lumbar exploration (N/A, 8/31/2015); spinal cord stimulator (12/19/2016);  shunt insertion (5/31/2017); liver biopsy (07/24/2017); cholecystectomy (2001); appendectomy (1982); spinal cord stimulator (05/24/2018); spinal cord stimulator (2003); knee arthroscopy (Right, 1990); full thickness skin graft (Left, 04/2018); other surgical procedure (11/10/2017); other neurological surg (08/2015); endometrial ablation (2001); tubal coagulation laparoscopic bilateral (Bilateral, 2001); implant neurostim/ (9/13/2019); and other (Left, 01/23/2020).    CURRENT MEDICATIONS  Home Medications     Reviewed by Matthew Johnson (Pharmacy Tech) on 02/22/20 at 2041  Med List Status: Complete   Medication Last Dose Status   amitriptyline (ELAVIL) 10 MG Tab 2/21/2020 Active   aspirin (ASA) 81 MG Chew Tab chewable tablet 2/21/2020 Active   diphenhydrAMINE (BENADRYL) 50 MG/ML  "Solution 2/22/2020 Active   divalproex (DEPAKOTE) 250 MG Tablet Delayed Response 2/21/2020 Active   divalproex (DEPAKOTE) 500 MG Tablet Delayed Response 2/21/2020 Active   duloxetine (CYMBALTA) 60 MG CPEP delayed-release capsule 2/21/2020 Active   EMGALITY 120 MG/ML Solution Auto-injector 2/10/2020 Active   gabapentin (NEURONTIN) 600 MG tablet 2/21/2020 Active   ketorolac (TORADOL) 60 MG/2ML Solution 2/22/2020 Active   levETIRAcetam (KEPPRA) 500 MG Tab 2/21/2020 Active   risperiDONE (RISPERDAL) 2 MG Tab 2/21/2020 Active                ALLERGIES  Allergies   Allergen Reactions   • Sumatriptan Anaphylaxis     Historical=\"Heart stops.\" Reaction  between 1995 to 1997   • Prochlorperazine Rash and Swelling     Tongue swelling. Reaction as a teen. (compazine)  Tolerated Phenergan on 02/24/15   • Vancomycin Shortness of Breath and Rash     Reaction in 2005.  D/W patient 8/31/15 - tolerated loading dose of vancomycin administered on 8/30/15 with some itching to chest with decreased infusion rate. Red Man Syndrome.  2018-tolerated slow drip with benadryl pre-med-had no problems.       PHYSICAL EXAM   VITAL SIGNS: /96   Pulse 99   Temp 36.1 °C (97 °F) (Temporal)   Resp 16   Ht 1.6 m (5' 3\")   Wt 76 kg (167 lb 8.8 oz)   SpO2 97%   BMI 29.68 kg/m²    Constitutional: Uncomfortable however nontoxic appearing middle-aged female.  Alert in no apparent distress.  HENT: Normocephalic, Atraumatic. Bilateral external ears normal. Nose normal.  Moist mucous membranes.  Oropharynx clear.  Eyes: Pupils are equal and reactive. Conjunctiva normal.   Neck: Supple, full range of motion  Heart: Regular rate and rhythm.  No murmurs.    Lungs: No respiratory distress, normal work of breathing. Lungs clear to auscultation bilaterally.  Abdomen Soft, no distention.  No tenderness to palpation.  Musculoskeletal: Atraumatic. No obvious deformities noted.  No lower extremity edema.  Skin: Warm, Dry.  No erythema, No rash.   Neurologic: " "Alert and oriented x3.  Cranial nerves II-XII intact.  Strength 5/5 and sensation inact throughout all 4 extremities.  No pronator drift.  No dysmetria.  Normal speech. Normal gait.  Psychiatric: Affect normal, Mood normal, Appears appropriate and not intoxicated.      DIAGNOSTIC STUDIES    LABS  Personally reviewed by me  Labs Reviewed   CBC WITH DIFFERENTIAL - Abnormal; Notable for the following components:       Result Value    WBC 16.1 (*)     RBC 4.15 (*)     MCHC 32.4 (*)     Neutrophils-Polys 90.80 (*)     Lymphocytes 3.60 (*)     Neutrophils (Absolute) 14.59 (*)     Lymphs (Absolute) 0.58 (*)     All other components within normal limits   BASIC METABOLIC PANEL - Abnormal; Notable for the following components:    Sodium 131 (*)     Bun 7 (*)     All other components within normal limits   VALPROIC ACID - Abnormal; Notable for the following components:    Valproic Acid 34.8 (*)     All other components within normal limits   ESTIMATED GFR       ED COURSE  Vitals:    02/22/20 1534 02/22/20 1802 02/22/20 1915 02/22/20 2012   BP:  144/88 112/84 101/67   Pulse:  87 93 96   Resp:  17 16 16   Temp:       TempSrc:       SpO2:  96% 94% 96%   Weight: 76 kg (167 lb 8.8 oz)      Height: 1.6 m (5' 3\")            Medications administered:  Medications   metoclopramide (REGLAN) injection 10 mg (10 mg Intravenous Given 2/22/20 1717)   diphenhydrAMINE (BENADRYL) injection 25 mg (25 mg Intravenous Given 2/22/20 1717)   ketamine (KETALAR) 25 mg in NS 50 mL (low dose pain IVPB) (0 mg Intravenous Stopped 2/22/20 1818)   NS infusion 1,000 mL (0 mL Intravenous Stopped 2/22/20 1742)   magnesium sulfate IVPB premix 2 g (0 g Intravenous Stopped 2/22/20 1903)   valproic acid 100 mg/mL injection - migraine 1,000 mg (1,000 mg Intravenous Given 2/22/20 1938)   diphenhydrAMINE (BENADRYL) injection 25 mg (25 mg Intravenous Given 2/22/20 1938)   ketorolac (TORADOL) injection 15 mg (15 mg Intravenous Given 2/22/20 2030)   diphenhydrAMINE " (BENADRYL) injection 25 mg (25 mg Intravenous Given 2/22/20 2029)     Patient was given IV fluids for headache.  IV hydration was used because oral hydration was not adequate alone.  Following fluid administration patient's symptoms were not improved.      Old records personally reviewed:  Patient was seen 3 days prior and treated symptomatically.  She was seen again this morning and treated symptomatically included receiving Reglan, Benadryl, ketamine, dexamethasone, Dilaudid.  Her last neuroimaging was performed on 1/28/2020 and showed a normal CT head and CTA of the head and neck.    4:27 PM Patient seen and examined at bedside. The patient presents with headache.     4:35 PM - Ordered for CBC with differential, BMP, and valproic acid to evaluate. Patient will be treated with Reglan injection 10 mg, Benadryl injection 25 mg, ketamine 25 mg in NS 50 mL, and NS infusion 1 L for her symptoms.     MEDICAL DECISION MAKING  Patient with history of hydrocephalus status post  shunt, seizure disorder on Keppra, chronic migraines, and reportedly autoimmune cerebritis who presents for her third visit this week for intractable headache.  She is afebrile with normal vital signs on arrival and nontoxic-appearing.  She has no focal neurologic deficits on exam concerning for intracranial hemorrhage or mass.  History does not seem to be consistent with subarachnoid hemorrhage as it is describes similar to her prior migraines.  Labs do demonstrate of leukocytosis however patient does not have any other infectious symptoms.  Clinically doubt meningitis or encephalitis based on her history and exam.  She has had normal recent imaging within the last month therefore I doubt issue with her  shunt or hydrocephalus.    6:10 PM - Upon reassessment, patient is resting comfortably with normal vital signs.  She continues to report ongoing pain without any resolution of her symptoms.    6:16 PM - I discussed the patient's case and the  above findings with Dr. Juárez (Neurology) who recommends administering Depakote due to low levels. Dr. Juárez additionally notes that if patient were to be admitted, that they will evaluate in the morning.     6:20 PM - Patient will be treated with valproic acid 100 mg/mL injection and magnesium sulfate IVPB premix 2 g.     8:15 PM -on reevaluation, the patient is reporting a worse headache than before after all of the above medications we have given.  We will plan for admission for continued symptom control and neurology assessment in the morning.    8:30 PM -I discussed the case with Dr. Ventura, hospitalist on-call, who accepts admission of the patient.  The patient is stable at this time for transfer to the floor.        The patient will return for new or worsening symptoms and is stable at the time of discharge.    The patient is referred to a primary physician for blood pressure management, diabetic screening, and for all other preventative health concerns.    DISPOSITION:  Patient will be admitted to Dr. Ventura in stable condition.    IMPRESSION  (R51) Acute intractable headache, unspecified headache type    Results, diagnoses, and treatment options were discussed with the patient and/or family. Patient verbalized understanding of plan of care.    New Prescriptions    No medications on file            IAnna (Victorinoibadolfo), am scribing for, and in the presence of, Isa Wolf M.D..    Electronically signed by: Anna Sears (Rob), 2/22/2020    IIsa M.D. personally performed the services described in this documentation, as scribed by Anna Sears in my presence, and it is both accurate and complete. C.     The note accurately reflects work and decisions made by me.  Isa Wolf M.D.  2/22/2020  8:54 PM

## 2020-02-23 NOTE — ED NOTES
Pharmacy Medication Reconciliation      Medication reconciliation updated and complete per pt at bedside  Allergies have been verified and updated   No oral ABX within the last 14 days  Pt home pharmacy:[facility]

## 2020-02-23 NOTE — PROGRESS NOTES
2 RN skin check completed with Amberly.  -Pt.'s previous port access site (Right upper chest) that was removed last month is open and covered by bandage pt. Placed before admission.  -Pt.'s current port (Left upper chest) is CDI  -Skin is otherwise CDI

## 2020-02-23 NOTE — H&P
Hospital Medicine History & Physical Note    Date of Service  2/22/2020    Primary Care Physician  Elliott Power M.D.    Consultants  Neurology    Code Status  Full    Chief Complaint  Headache    History of Presenting Illness  49 y.o. female who presented 2/22/2020 with migraine headache.  Patient has had frequent admissions for neuro complaints, this is her third visit in 3 days for intractable migraine.  She was discharged this morning, got Decadron at discharge.  In the ER, she did receive multiple medications however her migraine persisted.  She states they never totally go away, generally around 5/10, now this is a 10/10.  She states it is behind her right eye, sharp in nature.  She is currently in between neurologist.  She does complain of nausea and vomiting.  She states at home she takes Benadryl/Toradol cocktail.  She does have a history in the chart of autoimmune cerebritis and a history of a  shunt, she did just have CT of the head on 1/28 which did not show anything acute.  Because of this history, neurology was consulted.  I did discuss the case including labs and old imaging with the ER physician.    Review of Systems  Review of Systems   Constitutional: Negative for chills, fever and malaise/fatigue.   HENT: Negative for congestion.    Respiratory: Negative for cough, sputum production, shortness of breath and stridor.    Cardiovascular: Negative for chest pain, palpitations and leg swelling.   Gastrointestinal: Positive for nausea and vomiting. Negative for abdominal pain, constipation and diarrhea.   Genitourinary: Negative for dysuria and urgency.   Musculoskeletal: Negative for falls and myalgias.   Neurological: Positive for headaches. Negative for dizziness, tingling, loss of consciousness and weakness.   Psychiatric/Behavioral: Negative for depression and suicidal ideas.   All other systems reviewed and are negative.      Past Medical History   has a past medical history of Allergy,  unspecified not elsewhere classified, Anemia (10/05/2017), Anesthesia, Anxiety, Anxiety, generalized, Arthritis, autoimmune, Autoimmune disorder ([facility]), Back pain, Clostridium difficile diarrhea (2014), Depression, Elevated liver enzymes (2017), Fall, H/O Clostridium difficile infection (2014), MRSA infection (2003), Hydrocephalus ([facility]) (2015), Joint pain (09/10/2019), Migraine with aura, with intractable migraine, so stated, without mention of status migrainosus, MRSA (methicillin resistant Staphylococcus aureus) (08/2015), MRSA (methicillin resistant Staphylococcus aureus) (12/2017), MRSA (methicillin resistant Staphylococcus aureus) (2018), Pituitary abnormality ([facility]) (2002), Requires supplemental oxygen, Seizure ([facility]) (started 2017), Staph infection, and Stroke ([facility]) (2007). She also has no past medical history of Addisons disease ([facility]) or Anginal syndrome ([facility]).    Surgical History   has a past surgical history that includes tonsillectomy (1985); other neurological surg ([id number]); irrigation & debridement neuro (N/A, 6/28/2015); lumbar exploration (N/A, 8/31/2015); spinal cord stimulator (12/19/2016);  shunt insertion (5/31/2017); liver biopsy (07/24/2017); cholecystectomy (2001); appendectomy (1982); spinal cord stimulator (05/24/2018); spinal cord stimulator (2003); knee arthroscopy (Right, 1990); full thickness skin graft (Left, 04/2018); other surgical procedure (11/10/2017); other neurological surg (08/2015); endometrial ablation (2001); tubal coagulation laparoscopic bilateral (Bilateral, 2001); pr implant neurostim/ (9/13/2019); and other (Left, 01/23/2020).     Family History  family history includes Alcohol abuse in her father; Diabetes in her father; Mult Sclerosis in her mother.     Social History   reports that she has never smoked. She has never used smokeless tobacco. She reports previous alcohol use. She reports that she does not use drugs.    Allergies  Allergies   Allergen Reactions  "  • Sumatriptan Anaphylaxis     Historical=\"Heart stops.\" Reaction  between  to    • Prochlorperazine Rash and Swelling     Tongue swelling. Reaction as a teen. (compazine)  Tolerated Phenergan on 02/24/15   • Vancomycin Shortness of Breath and Rash     Reaction in .  D/W patient 8/31/15 - tolerated loading dose of vancomycin administered on 8/30/15 with some itching to chest with decreased infusion rate. Red Man Syndrome.  2018-tolerated slow drip with benadryl pre-med-had no problems.       Medications  Prior to Admission Medications   Prescriptions Last Dose Informant Patient Reported? Taking?   EMGALITY 120 MG/ML Solution Auto-injector 2/10/2020 at pm Patient Yes No   Sig: Inject 120 mg as instructed Q30 DAYS.   amitriptyline (ELAVIL) 10 MG Tab 2020 at hs Patient Yes Yes   Sig: Take 20 mg by mouth every evening.   aspirin (ASA) 81 MG Chew Tab chewable tablet 2020 at am Patient Yes No   Sig: Take 81 mg by mouth every morning.   diphenhydrAMINE (BENADRYL) 50 MG/ML Solution 2020 at 1200 Patient Yes Yes   Si mg by Intravenous route 4 times a day as needed (headache).   divalproex (DEPAKOTE) 250 MG Tablet Delayed Response 2020 at pm Patient Yes No   Sig: Take 250 mg by mouth 2 Times a Day.   divalproex (DEPAKOTE) 500 MG Tablet Delayed Response 2020 at pm Patient Yes Yes   Sig: Take 500 mg by mouth every evening.   duloxetine (CYMBALTA) 60 MG CPEP delayed-release capsule 2020 at pm Patient Yes No   Sig: Take 60 mg by mouth 2 times a day.   gabapentin (NEURONTIN) 600 MG tablet 2020 at pm Patient Yes No   Sig: Take 600 mg by mouth 3 times a day.   ketorolac (TORADOL) 60 MG/2ML Solution 2020 at 1200 Patient Yes Yes   Si mg by Intramuscular route 2 times a day as needed (headache).   levETIRAcetam (KEPPRA) 500 MG Tab 2020 at pm Patient Yes No   Sig: Take 500 mg by mouth 2 Times a Day.   risperiDONE (RISPERDAL) 2 MG Tab 2020 at pm Patient Yes No   Sig: " Take 2 mg by mouth 2 times a day.      Facility-Administered Medications: None       Physical Exam  Temp:  [36.1 °C (97 °F)-36.7 °C (98 °F)] 36.1 °C (97 °F)  Pulse:  [85-99] 99  Resp:  [16-18] 16  BP: (101-153)/(54-96) 125/54  SpO2:  [94 %-99 %] 97 %    Physical Exam  Vitals signs and nursing note reviewed.   Constitutional:       General: She is not in acute distress.     Appearance: She is well-developed. She is not diaphoretic.   HENT:      Head: Normocephalic and atraumatic.      Right Ear: External ear normal.      Left Ear: External ear normal.      Nose: Nose normal. No congestion or rhinorrhea.      Mouth/Throat:      Mouth: Mucous membranes are dry.      Pharynx: No oropharyngeal exudate.   Eyes:      General: No scleral icterus.        Right eye: No discharge.         Left eye: No discharge.      Extraocular Movements: Extraocular movements intact.   Neck:      Musculoskeletal: Normal range of motion and neck supple.      Trachea: No tracheal deviation.   Cardiovascular:      Rate and Rhythm: Normal rate and regular rhythm.      Heart sounds: No murmur. No friction rub. No gallop.    Pulmonary:      Effort: Pulmonary effort is normal. No respiratory distress.      Breath sounds: Normal breath sounds. No stridor. No wheezing or rales.      Comments: Left chest port   Chest:      Chest wall: No tenderness.   Abdominal:      General: Bowel sounds are normal. There is no distension.      Palpations: Abdomen is soft.      Tenderness: There is no abdominal tenderness. There is no guarding or rebound.   Musculoskeletal: Normal range of motion.         General: No tenderness.      Right lower leg: No edema.      Left lower leg: No edema.   Lymphadenopathy:      Cervical: No cervical adenopathy.   Skin:     General: Skin is warm and dry.      Coloration: Skin is not jaundiced.      Findings: No erythema or rash.   Neurological:      General: No focal deficit present.      Mental Status: She is alert and oriented to  person, place, and time.      Cranial Nerves: No cranial nerve deficit.   Psychiatric:         Behavior: Behavior normal.         Thought Content: Thought content normal.         Judgment: Judgment normal.      Comments: Flat affect          Laboratory:  Recent Labs     02/22/20  1700   WBC 16.1*   RBC 4.15*   HEMOGLOBIN 12.0   HEMATOCRIT 37.0   MCV 89.2   MCH 28.9   MCHC 32.4*   RDW 47.4   PLATELETCT 289   MPV 9.2     Recent Labs     02/22/20  1700   SODIUM 131*   POTASSIUM 3.9   CHLORIDE 97   CO2 23   GLUCOSE 91   BUN 7*   CREATININE 0.51   CALCIUM 9.1     Recent Labs     02/22/20  1700   GLUCOSE 91         No results for input(s): NTPROBNP in the last 72 hours.      No results for input(s): TROPONINT in the last 72 hours.    Urinalysis:    No results found     Imaging:  No orders to display         Assessment/Plan:  I anticipate this patient will require at least two midnights for appropriate medical management, necessitating inpatient admission.    Complicated migraine- (present on admission)  Assessment & Plan  -Significant headache, has been resistant to treatment  -Patient does have a complicated history with autoimmune cerebritis as well as a history of a  shunt  -Await additional recommendations per neurology  -Start Benadryl and Toradol combination  -Patient will be given a one-time dose of narcotic and then narcotics will be avoided  -With her her pain being primarily behind her right eye, possibly a cluster headache, I will start oxygen therapy  -No imaging at this point time but neurology may request imaging  -I did personally review her CT head from 1/28, noted no acute intracranial abnormality, did note catheter in place    Hyponatremia- (present on admission)  Assessment & Plan  -Due to dehydration  -Start IV fluids  -Repeat BMP in the morning    SIRS (systemic inflammatory response syndrome) (HCC)- (present on admission)  Assessment & Plan  -SIRS criteria identified on my evaluation include:   Tachycardia, with heart rate greater than 90 BPM and Leukocyosis, with WBC greater than 12,000  SIRS is non-infectious, the patient does not have sepsis  -At this point in time, I do not see sign of infection  -Antibiotics are currently not warranted  -She is however at risk of worsening and does require close monitoring    Drug-seeking behavior- (present on admission)  Assessment & Plan  -She will get a one-time dose of narcotic otherwise, try to avoid narcotics    Seizures (HCC)- (present on admission)  Assessment & Plan  -None currently, continue home meds    Depression- (present on admission)  Assessment & Plan  -Continue home meds      VTE prophylaxis: Lovenox

## 2020-02-23 NOTE — ASSESSMENT & PLAN NOTE
-Significant headache, has been resistant to treatment  -Patient does have a complicated history with autoimmune cerebritis as well as a history of a  shunt  -Start Benadryl and Toradol combination  -Patient will be given a one-time dose of narcotic and then narcotics will be avoided  -With her her pain being primarily behind her right eye, possibly a cluster headache, I will start oxygen therapy  -No imaging at this point time but neurology may request imaging  -I did personally review her CT head from 1/28, noted no acute intracranial abnormality, did note catheter in place  Neurology recommended increasing Depakote 500 mg 3 times daily and Keppra 750 twice daily.  Patient will need to follow-up outpatient neurology.

## 2020-02-23 NOTE — PROGRESS NOTES
"Pain control discussed with Dr. Salomon. Dr Salomon spoke to patient this morning about pain control plan. Patient now stating she needs something more for her pain. This RN explained increase in depakote dose and why we cannot given toradol more often. Patient stating depakote \"does nothing for me.\" I will speak to Dr. Salomon again regarding unresolved head pain.   "

## 2020-02-23 NOTE — ASSESSMENT & PLAN NOTE
-SIRS criteria identified on my evaluation include:  Tachycardia, with heart rate greater than 90 BPM and Leukocyosis, with WBC greater than 12,000  SIRS is non-infectious, the patient does not have sepsis  -At this point in time, I do not see sign of infection  -Antibiotics are currently not warranted  -She is however at risk of worsening and does require close monitoring

## 2020-02-23 NOTE — PROGRESS NOTES
Hospital Medicine Daily Progress Note    Date of Service  2/23/2020    Chief Complaint  49 y.o. female admitted 2/22/2020 with history of autoimmune cerebritis, status post  shunt, depression, migraines comes into emergency room with headaches.  She describes a headache in the center of head radiating down to the right eye.  Associated with nausea and photophobia.    Hospital Course    Patient arrived to the hospital with normal vital signs.  No recent imaging was done since she had a CT scan on 1/28.  She was treated with IV Depakote, Benadryl and Toradol.      Interval Problem Update  2/23: Patient seen and examined by me.  Spoke to neurology in detail who suggested to increase her Depakote 500 3 times daily and increase Keppra to 750 twice daily.  I will avoid all opiate medications in this patient.  I will also avoid Zofran as it causes headaches.    Consultants/Specialty  Neurology    Code Status  Full    Disposition  TBD    Review of Systems  Review of Systems   Constitutional: Negative for chills, fever and malaise/fatigue.   HENT: Negative for congestion.    Respiratory: Negative for cough, sputum production, shortness of breath and stridor.    Cardiovascular: Negative for chest pain, palpitations and leg swelling.   Gastrointestinal: Positive for nausea and vomiting. Negative for abdominal pain, constipation and diarrhea.   Genitourinary: Negative for dysuria and urgency.   Musculoskeletal: Negative for falls and myalgias.   Neurological: Positive for headaches. Negative for dizziness, tingling, loss of consciousness and weakness.   Psychiatric/Behavioral: Negative for depression and suicidal ideas.   All other systems reviewed and are negative.       Physical Exam  Temp:  [36.1 °C (96.9 °F)-36.8 °C (98.3 °F)] 36.7 °C (98 °F)  Pulse:  [76-99] 76  Resp:  [15-19] 15  BP: ()/(54-96) 99/61  SpO2:  [94 %-97 %] 97 %    Physical Exam  Vitals signs and nursing note reviewed.   Constitutional:       General:  She is not in acute distress.     Appearance: She is well-developed. She is not diaphoretic.   HENT:      Head: Normocephalic and atraumatic.      Right Ear: External ear normal.      Left Ear: External ear normal.      Nose: Nose normal. No congestion or rhinorrhea.      Mouth/Throat:      Mouth: Mucous membranes are dry.      Pharynx: No oropharyngeal exudate.   Eyes:      General: No scleral icterus.        Right eye: No discharge.         Left eye: No discharge.      Extraocular Movements: Extraocular movements intact.   Neck:      Musculoskeletal: Normal range of motion and neck supple.      Trachea: No tracheal deviation.   Cardiovascular:      Rate and Rhythm: Normal rate and regular rhythm.      Heart sounds: No murmur. No friction rub. No gallop.    Pulmonary:      Effort: Pulmonary effort is normal. No respiratory distress.      Breath sounds: Normal breath sounds. No stridor. No wheezing or rales.      Comments: Left chest port   Chest:      Chest wall: No tenderness.   Abdominal:      General: Bowel sounds are normal. There is no distension.      Palpations: Abdomen is soft.      Tenderness: There is no abdominal tenderness. There is no guarding or rebound.   Musculoskeletal: Normal range of motion.         General: No tenderness.      Right lower leg: No edema.      Left lower leg: No edema.   Lymphadenopathy:      Cervical: No cervical adenopathy.   Skin:     General: Skin is warm and dry.      Coloration: Skin is not jaundiced.      Findings: No erythema or rash.   Neurological:      General: No focal deficit present.      Mental Status: She is alert and oriented to person, place, and time.      Cranial Nerves: No cranial nerve deficit.   Psychiatric:         Behavior: Behavior normal.         Thought Content: Thought content normal.         Judgment: Judgment normal.      Comments: Flat affect          Fluids    Intake/Output Summary (Last 24 hours) at 2/23/2020 1224  Last data filed at 2/23/2020  1000  Gross per 24 hour   Intake 2776.67 ml   Output 0 ml   Net 2776.67 ml       Laboratory  Recent Labs     02/22/20  1700 02/23/20  0311   WBC 16.1* 9.5   RBC 4.15* 3.77*   HEMOGLOBIN 12.0 11.0*   HEMATOCRIT 37.0 33.6*   MCV 89.2 89.1   MCH 28.9 29.2   MCHC 32.4* 32.7*   RDW 47.4 48.2   PLATELETCT 289 219   MPV 9.2 9.3     Recent Labs     02/22/20  1700 02/23/20  0311   SODIUM 131* 136   POTASSIUM 3.9 3.6   CHLORIDE 97 103   CO2 23 25   GLUCOSE 91 94   BUN 7* 6*   CREATININE 0.51 0.51   CALCIUM 9.1 8.4*                   Imaging  No orders to display        Assessment/Plan  Complicated migraine- (present on admission)  Assessment & Plan  -Significant headache, has been resistant to treatment  -Patient does have a complicated history with autoimmune cerebritis as well as a history of a  shunt  -Start Benadryl and Toradol combination  -Patient will be given a one-time dose of narcotic and then narcotics will be avoided  -With her her pain being primarily behind her right eye, possibly a cluster headache, I will start oxygen therapy  -No imaging at this point time but neurology may request imaging  -I did personally review her CT head from 1/28, noted no acute intracranial abnormality, did note catheter in place  Neurology recommended increasing Depakote 500 mg 3 times daily and Keppra 750 twice daily.  Patient will need to follow-up outpatient neurology.    Hyponatremia- (present on admission)  Assessment & Plan  Resolved    SIRS (systemic inflammatory response syndrome) (HCC)- (present on admission)  Assessment & Plan  -SIRS criteria identified on my evaluation include:  Tachycardia, with heart rate greater than 90 BPM and Leukocyosis, with WBC greater than 12,000  SIRS is non-infectious, the patient does not have sepsis  -At this point in time, I do not see sign of infection  -Antibiotics are currently not warranted  -She is however at risk of worsening and does require close monitoring    Drug-seeking behavior-  (present on admission)  Assessment & Plan  Patient seeking opiate therapy and I would avoid using all opiates.    Seizures (HCC)- (present on admission)  Assessment & Plan  -None currently, continue home meds    Depression- (present on admission)  Assessment & Plan  -Continue home meds       VTE prophylaxis: lovenox

## 2020-02-23 NOTE — ED NOTES
Patient awake alert and oriented x 4, Glascow 15, bed in low position, call light within reach, on room air, attached to cardiac monitor, unlabored breathing noted, no cough noted, interacts with staff, interactions noted as appropriate, complains of head pain.

## 2020-02-23 NOTE — ED NOTES
Patient awake alert and oriented x 4, Glascow 15, bed in low position, call light within reach, on room air, attached to cardiac monitor, unlabored breathing noted, no cough noted, interacts with staff, interactions noted as appropriate, IV normal saline infusing, awaiting valproic acid delivery from pharmacy.

## 2020-02-23 NOTE — ED NOTES
Patient requested pain medication and Benadryl, Dr. Wolf informed, order for Benadryl obtained, will continue to assess patient.

## 2020-02-23 NOTE — CONSULTS
Neurology Initial Consult H&P  Neurohospitalist Service, [facility]    Referring Physician: Pastora Salomon M.D.    Chief Complaint   Patient presents with   • Migraine     x 6 days, pt endorses taking benadryl and toradol without relief. pt was seen here yesterday for the same.        HPI: Enedelia Pang is a 49 y.o. female with history of, autoimmune cerebritis, status post  shunt, depression, migraines intractable admitted through the ER with a headaches.  She reports almost daily headaches, severity is 7-10, photophobia with nausea.  Headaches are located in the central head region with radiating down to the right eye mostly.  This is follow-up for several visits for the headaches, on multiple migraine prophylaxis.  Polypharmacy related headaches are suspected.    Review of systems: In addition to what is detailed in the HPI above, (and scanned into the chart if and when applicable), all other systems reviewed and are negative.    Past Medical History:    has a past medical history of Allergy, unspecified not elsewhere classified, Anemia (10/05/2017), Anesthesia, Anxiety, Anxiety, generalized, Arthritis, autoimmune, Autoimmune disorder ([facility]), Back pain, Clostridium difficile diarrhea (2014), Depression, Elevated liver enzymes (2017), Fall, H/O Clostridium difficile infection (2014), MRSA infection (2003), Hydrocephalus ([facility]) (2015), Joint pain (09/10/2019), Migraine with aura, with intractable migraine, so stated, without mention of status migrainosus, MRSA (methicillin resistant Staphylococcus aureus) (08/2015), MRSA (methicillin resistant Staphylococcus aureus) (12/2017), MRSA (methicillin resistant Staphylococcus aureus) (2018), Pituitary abnormality ([facility]) (2002), Requires supplemental oxygen, Seizure ([facility]) (started 2017), Staph infection, and Stroke ([facility]) (2007). She also has no past medical history of Addisons disease ([facility]) or Anginal syndrome ([facility]).    FHx:  family history  "includes Alcohol abuse in her father; Diabetes in her father; Mult Sclerosis in her mother.    SHx:   reports that she has never smoked. She has never used smokeless tobacco. She reports previous alcohol use. She reports that she does not use drugs.    Allergies:  Allergies   Allergen Reactions   • Sumatriptan Anaphylaxis     Historical=\"Heart stops.\" Reaction  between 1995 to 1997   • Prochlorperazine Rash and Swelling     Tongue swelling. Reaction as a teen. (compazine)  Tolerated Phenergan on 02/24/15   • Vancomycin Shortness of Breath and Rash     Reaction in 2005.  D/W patient 8/31/15 - tolerated loading dose of vancomycin administered on 8/30/15 with some itching to chest with decreased infusion rate. Red Man Syndrome.  2018-tolerated slow drip with benadryl pre-med-had no problems.       Medications:    Current Facility-Administered Medications:   •  divalproex (DEPAKOTE) delayed-release tablet 500 mg, 500 mg, Oral, Q8HRS, Pastora Salomon M.D.  •  levETIRAcetam (KEPPRA) tablet 750 mg, 750 mg, Oral, BID, Pastora Salomon M.D.  •  amitriptyline (ELAVIL) tablet 20 mg, 20 mg, Oral, Nightly, Man Ventura D.O., 20 mg at 02/22/20 2321  •  aspirin (ASA) chewable tab 81 mg, 81 mg, Oral, QAM, Man Ventura D.O., 81 mg at 02/23/20 0450  •  DULoxetine (CYMBALTA) capsule 60 mg, 60 mg, Oral, BID, Man Ventura D.O., 60 mg at 02/23/20 0447  •  gabapentin (NEURONTIN) capsule 600 mg, 600 mg, Oral, Q8HRS, Man Ventura D.O., 600 mg at 02/23/20 0447  •  risperiDONE (RISPERDAL) tablet 2 mg, 2 mg, Oral, BID, Man Ventura D.O., 2 mg at 02/23/20 0446  •  senna-docusate (PERICOLACE or SENOKOT S) 8.6-50 MG per tablet 2 Tab, 2 Tab, Oral, BID **AND** polyethylene glycol/lytes (MIRALAX) PACKET 1 Packet, 1 Packet, Oral, QDAY PRN **AND** magnesium hydroxide (MILK OF MAGNESIA) suspension 30 mL, 30 mL, Oral, QDAY PRN **AND** bisacodyl (DULCOLAX) suppository 10 mg, 10 mg, Rectal, QDAY PRN, Man Ventura D.O.  •  enoxaparin " (LOVENOX) inj 40 mg, 40 mg, Subcutaneous, DAILY, ELLIS RolandOFrederick, 40 mg at 02/23/20 0450  •  acetaminophen (TYLENOL) tablet 650 mg, 650 mg, Oral, Q6HRS PRN, Man Ventura D.O.  •  enalaprilat (VASOTEC) injection 1.25 mg, 1.25 mg, Intravenous, Q6HRS PRN, Man Ventura D.O.  •  LORazepam (ATIVAN) injection 0.5 mg, 0.5 mg, Intravenous, Q4HRS PRN, Man Ventura D.O.  •  diphenhydrAMINE (BENADRYL) injection 50 mg, 50 mg, Intravenous, Q6HRS PRN, ELLIS RolandO., 50 mg at 02/23/20 0912  •  ketorolac (TORADOL) injection 30 mg, 30 mg, Intravenous, Q6HRS PRN, ELLIS RolandOFrederick, 30 mg at 02/23/20 0912  •  acetaminophen/caffeine/butalbital 325-40-50 mg (FIORICET) -40 MG per tablet 1 Tab, 1 Tab, Oral, Q6HRS PRN, Man Ventura D.O., 1 Tab at 02/23/20 1047    Physical Examination:     Vitals:    02/22/20 2215 02/23/20 0000 02/23/20 0400 02/23/20 0800   BP: 130/88 122/67 100/56 (!) 99/61   Pulse: 92 84 77 76   Resp: 19 17 16 15   Temp: 36.8 °C (98.3 °F) 36.6 °C (97.9 °F) 36.1 °C (96.9 °F) 36.7 °C (98 °F)   TempSrc: Temporal Temporal Temporal Temporal   SpO2: 97% 95% 97% 97%   Weight:       Height:           General: Patient is awake and in no acute distress  CV: RRR    NEUROLOGICAL EXAM:     Mental status: Awake, alert and fully oriented, follows commands  Speech and language: speech is clear and fluent. The patient is able to name and repeat.  Cranial nerve exam: Pupils are equal, round and reactive to light bilaterally. Visual fields are full. Extraocular muscles are intact. Sensation in the face is intact to light touch. Face is symmetric. Hearing intact to conversation.  Palate elevates symmetrically. Shoulder shrug is full. Tongue is midline.  Motor exam: Strength is 5/5 in all extremities both distally and proximally. Tone is normal. No abnormal movements were seen on exam.  Sensory exam: No sensory deficits identified   Deep tendon reflexes:  1+ and symmetric. Toes down-going  bilaterally.  Coordination: no ataxia on finger-to-nose or heel-to-shin testing.  Gait: deferred due to headaches.    Objective Data:    Labs:  Lab Results   Component Value Date/Time    PROTHROMBTM 13.1 01/28/2020 08:05 PM    INR 0.97 01/28/2020 08:05 PM      Lab Results   Component Value Date/Time    WBC 9.5 02/23/2020 03:11 AM    RBC 3.77 (L) 02/23/2020 03:11 AM    HEMOGLOBIN 11.0 (L) 02/23/2020 03:11 AM    HEMATOCRIT 33.6 (L) 02/23/2020 03:11 AM    MCV 89.1 02/23/2020 03:11 AM    MCH 29.2 02/23/2020 03:11 AM    MCHC 32.7 (L) 02/23/2020 03:11 AM    MPV 9.3 02/23/2020 03:11 AM    NEUTSPOLYS 90.80 (H) 02/22/2020 05:00 PM    LYMPHOCYTES 3.60 (L) 02/22/2020 05:00 PM    MONOCYTES 4.70 02/22/2020 05:00 PM    EOSINOPHILS 0.00 02/22/2020 05:00 PM    BASOPHILS 0.30 02/22/2020 05:00 PM    HYPOCHROMIA 1+ 01/11/2017 02:29 PM    ANISOCYTOSIS 1+ 01/11/2017 02:29 PM      Lab Results   Component Value Date/Time    SODIUM 136 02/23/2020 03:11 AM    POTASSIUM 3.6 02/23/2020 03:11 AM    CHLORIDE 103 02/23/2020 03:11 AM    CO2 25 02/23/2020 03:11 AM    GLUCOSE 94 02/23/2020 03:11 AM    BUN 6 (L) 02/23/2020 03:11 AM    CREATININE 0.51 02/23/2020 03:11 AM      Lab Results   Component Value Date/Time    CHOLSTRLTOT 150 05/22/2017 03:31 AM    LDL 86 05/22/2017 03:31 AM    HDL 52 05/22/2017 03:31 AM    TRIGLYCERIDE 61 05/22/2017 03:31 AM       Lab Results   Component Value Date/Time    ALKPHOSPHAT 127 (H) 02/07/2020 03:38 PM    ASTSGOT 12 02/07/2020 03:38 PM    ALTSGPT 40 02/07/2020 03:38 PM    TBILIRUBIN 0.3 02/07/2020 03:38 PM        Imaging/Testing:    I interpreted and/or reviewed the patient's neuroimaging    No orders to display       Assessment and Plan:  Enedelia Pang is a 49 y.o. female with history of, autoimmune cerebritis, status post  shunt, depression, migraines intractable admitted through the ER with a headaches.  She reports almost daily headaches, severity is 7-10, photophobia with nausea.  Headaches are located in  the central head region with radiating down to the right eye mostly.  This is follow-up for several visits for the headaches, on multiple migraine prophylaxis.  Polypharmacy related headaches are suspected.  Plan:  Discussed the case with the primary team , we are going to increase her Depakote to 500 mg 3 times daily, Keppra 750 mg twice daily and may use Toradol on a as needed basis for now start hydration.  Keep magnesium level 2.0 or above.    Patient will need outpatient regular migraine specialist to reduce her polypharmacy that may be the underlying issue for her intractable headaches.  Neurologically stable at this time.  Will sign off for now, please call with questions    Maxx Juárez MD  ABPN Board Certified Neurology  (t) [phone]

## 2020-02-23 NOTE — ED NOTES
Pt updated on plan of care and is awaiting Valpric acid from pharmacy, this RN sent message to pharm for missing med.

## 2020-02-23 NOTE — ED NOTES
Patient resting in bed, sleeping, no signs of pain or distress, unlabored breathing noted, attached to cardiac monitor, bed in low position, call light within reach, IV fluids completed.

## 2020-02-23 NOTE — CARE PLAN
Problem: Communication  Goal: The ability to communicate needs accurately and effectively will improve  Outcome: PROGRESSING AS EXPECTED  Note:   Pt. Is A&O x 4. Follows commands and responds appropriately. Will continue to round hourly and encourage the Pt. To ask questions and voice feelings.            Problem: Safety  Goal: Will remain free from injury  Outcome: PROGRESSING AS EXPECTED  Note: Pt ais a SB assist to ambulate. Room has adequate lighting, is free of clutter, call light is within reach, and bed is locked and in the lowest position. Will continue to hourly round.

## 2020-02-24 ENCOUNTER — PATIENT OUTREACH (OUTPATIENT)
Dept: HEALTH INFORMATION MANAGEMENT | Facility: OTHER | Age: 50
End: 2020-02-24

## 2020-02-24 VITALS
DIASTOLIC BLOOD PRESSURE: 66 MMHG | OXYGEN SATURATION: 100 % | WEIGHT: 168.65 LBS | SYSTOLIC BLOOD PRESSURE: 106 MMHG | TEMPERATURE: 97.7 F | BODY MASS INDEX: 29.88 KG/M2 | HEIGHT: 63 IN | HEART RATE: 80 BPM | RESPIRATION RATE: 16 BRPM

## 2020-02-24 PROCEDURE — 700102 HCHG RX REV CODE 250 W/ 637 OVERRIDE(OP): Performed by: HOSPITALIST

## 2020-02-24 PROCEDURE — 700111 HCHG RX REV CODE 636 W/ 250 OVERRIDE (IP): Performed by: INTERNAL MEDICINE

## 2020-02-24 PROCEDURE — 99239 HOSP IP/OBS DSCHRG MGMT >30: CPT | Performed by: HOSPITALIST

## 2020-02-24 PROCEDURE — A9270 NON-COVERED ITEM OR SERVICE: HCPCS | Performed by: INTERNAL MEDICINE

## 2020-02-24 PROCEDURE — A9270 NON-COVERED ITEM OR SERVICE: HCPCS | Performed by: HOSPITALIST

## 2020-02-24 PROCEDURE — 700102 HCHG RX REV CODE 250 W/ 637 OVERRIDE(OP): Performed by: INTERNAL MEDICINE

## 2020-02-24 PROCEDURE — 700111 HCHG RX REV CODE 636 W/ 250 OVERRIDE (IP): Performed by: HOSPITALIST

## 2020-02-24 RX ORDER — LEVETIRACETAM 750 MG/1
750 TABLET ORAL 2 TIMES DAILY
Qty: 60 TAB | Refills: 0 | Status: ON HOLD | OUTPATIENT
Start: 2020-02-24 | End: 2021-06-17

## 2020-02-24 RX ORDER — MEPERIDINE HYDROCHLORIDE 50 MG/1
50 TABLET ORAL EVERY 4 HOURS PRN
Status: DISCONTINUED | OUTPATIENT
Start: 2020-02-24 | End: 2020-02-24

## 2020-02-24 RX ORDER — MEPERIDINE HYDROCHLORIDE 50 MG/ML
50 INJECTION INTRAMUSCULAR; INTRAVENOUS; SUBCUTANEOUS ONCE
Status: COMPLETED | OUTPATIENT
Start: 2020-02-24 | End: 2020-02-24

## 2020-02-24 RX ADMIN — KETOROLAC TROMETHAMINE 30 MG: 30 INJECTION, SOLUTION INTRAMUSCULAR at 03:44

## 2020-02-24 RX ADMIN — LORAZEPAM 0.5 MG: 2 INJECTION INTRAMUSCULAR; INTRAVENOUS at 13:27

## 2020-02-24 RX ADMIN — GABAPENTIN 600 MG: 300 CAPSULE ORAL at 13:27

## 2020-02-24 RX ADMIN — HEPARIN 500 UNITS: 100 SYRINGE at 16:23

## 2020-02-24 RX ADMIN — MEPERIDINE HYDROCHLORIDE 50 MG: 50 INJECTION INTRAMUSCULAR; INTRAVENOUS; SUBCUTANEOUS at 15:44

## 2020-02-24 RX ADMIN — DIVALPROEX SODIUM 500 MG: 500 TABLET, DELAYED RELEASE ORAL at 13:27

## 2020-02-24 RX ADMIN — ENOXAPARIN SODIUM 40 MG: 100 INJECTION SUBCUTANEOUS at 05:36

## 2020-02-24 RX ADMIN — DIPHENHYDRAMINE HYDROCHLORIDE 50 MG: 50 INJECTION INTRAMUSCULAR; INTRAVENOUS at 10:40

## 2020-02-24 RX ADMIN — KETOROLAC TROMETHAMINE 30 MG: 30 INJECTION, SOLUTION INTRAMUSCULAR at 10:40

## 2020-02-24 RX ADMIN — DIPHENHYDRAMINE HYDROCHLORIDE 50 MG: 50 INJECTION INTRAMUSCULAR; INTRAVENOUS at 16:14

## 2020-02-24 RX ADMIN — DIVALPROEX SODIUM 500 MG: 500 TABLET, DELAYED RELEASE ORAL at 05:37

## 2020-02-24 RX ADMIN — ASPIRIN 81 MG 81 MG: 81 TABLET ORAL at 05:37

## 2020-02-24 RX ADMIN — LEVETIRACETAM 750 MG: 500 TABLET ORAL at 05:37

## 2020-02-24 RX ADMIN — LORAZEPAM 0.5 MG: 2 INJECTION INTRAMUSCULAR; INTRAVENOUS at 08:01

## 2020-02-24 RX ADMIN — KETOROLAC TROMETHAMINE 30 MG: 30 INJECTION, SOLUTION INTRAMUSCULAR at 16:13

## 2020-02-24 RX ADMIN — RISPERIDONE 2 MG: 2 TABLET ORAL at 05:43

## 2020-02-24 RX ADMIN — GABAPENTIN 600 MG: 300 CAPSULE ORAL at 05:36

## 2020-02-24 RX ADMIN — LORAZEPAM 0.5 MG: 2 INJECTION INTRAMUSCULAR; INTRAVENOUS at 03:43

## 2020-02-24 RX ADMIN — BUTALBITAL, ACETAMINOPHEN, AND CAFFEINE 1 TABLET: 50; 325; 40 TABLET ORAL at 08:01

## 2020-02-24 RX ADMIN — DIPHENHYDRAMINE HYDROCHLORIDE 50 MG: 50 INJECTION INTRAMUSCULAR; INTRAVENOUS at 03:44

## 2020-02-24 RX ADMIN — DULOXETINE HYDROCHLORIDE 60 MG: 60 CAPSULE, DELAYED RELEASE ORAL at 05:37

## 2020-02-24 NOTE — PROGRESS NOTES
Patient with c/o 10/10 headache after being given ordered IV caffeine, IV Toradol, and IV benadryl with no relief. Per patient the only medication that works when she is having a flare up is IV ketamine. Patient is unsure of the dose that she has received in the past for this medication. Call placed to  from the hospitalist service. Per  no narcotics will be ordered for patients headache. Plan is to continue with current medications ordered and day shift hospitalist will see the patient in the morning. Per physician RN may order mag level and administer 2g IV magnesium is patients mag level is <2. Read back and verified. Will implement orders. Lights dimmed and Ice pack given to place on head for comfort. Will continue to monitor.

## 2020-02-24 NOTE — DISCHARGE SUMMARY
Discharge Summary    CHIEF COMPLAINT ON ADMISSION  Chief Complaint   Patient presents with   • Migraine     x 6 days, pt endorses taking benadryl and toradol without relief. pt was seen here yesterday for the same.        Reason for Admission  Migraine      Admission Date  2/22/2020    CODE STATUS  Full Code    HPI & HOSPITAL COURSE  This is a 49 y.o. female here with with history of autoimmune cerebritis, status post  shunt, depression, migraines comes into emergency room with headaches.  She describes a headache in the center of head radiating down to the right eye.  Associated with nausea and photophobia.   Patient arrived to the hospital with normal vital signs.  No recent imaging was done since she had a CT scan on 1/28.  She was treated with IV Depakote, Benadryl and Toradol. Neurology evaluated patient to increase her Depakote while inpatient 500 3 times daily.  Patient has a history of Depakote toxicity and she will be reduced back to her home dose on discharge.  Keppra was increased to 750 twice daily.  It is likely that the patient is having medication overuse headaches and will need to reduce her medications as an outpatient.  Additionally, patient was treated with Benadryl, IV caffeine, meperidine which resolved her headaches.  Patient has a follow-up with neurology on 3/5.    Of note she has been admitted in the hospital 3 times over the past month and repeated ER visits with the same complaints.  All imaging has been negative thus far.  Her vital signs and lab work has been stable.  The patient shows no signs of being in severe pain nor does her vitals indicate this.  The patient seeking IV ketamine and IV Dilaudid which should be avoided in this patient.    Therefore, she is discharged in good and stable condition to home with close outpatient follow-up.    The patient met 2-midnight criteria for an inpatient stay at the time of discharge.    Discharge Date  2/24/2020    FOLLOW UP ITEMS POST  DISCHARGE  Follow-up with neurology    DISCHARGE DIAGNOSES  Active Problems:    Complicated migraine POA: Yes    SIRS (systemic inflammatory response syndrome) (HCC) POA: Yes    Hyponatremia POA: Yes    Depression POA: Yes    Seizures (HCC) POA: Yes    Drug-seeking behavior POA: Yes  Resolved Problems:    * No resolved hospital problems. *      FOLLOW UP  No future appointments.  Elliott Power M.D.  [address]  [facility] [address]  [phone]    On 3/3/2020  Please check in for your 11am hospital follow up with primary care. Thank you!       MEDICATIONS ON DISCHARGE     Medication List      CHANGE how you take these medications      Instructions   levetiracetam 750 MG tablet  What changed:    · medication strength  · how much to take  Commonly known as:  KEPPRA   Take 1 Tab by mouth 2 Times a Day.  Dose:  750 mg        CONTINUE taking these medications      Instructions   amitriptyline 10 MG Tabs  Commonly known as:  ELAVIL   Take 20 mg by mouth every evening.  Dose:  20 mg     aspirin 81 MG Chew chewable tablet  Commonly known as:  ASA   Take 81 mg by mouth every morning.  Dose:  81 mg     diphenhydrAMINE 50 MG/ML Soln  Commonly known as:  BENADRYL   50 mg by Intravenous route 4 times a day as needed (headache).  Dose:  50 mg     * divalproex 250 MG Tbec  Commonly known as:  DEPAKOTE   Take 250 mg by mouth 2 Times a Day.  Dose:  250 mg     * divalproex 500 MG Tbec  Commonly known as:  DEPAKOTE   Take 500 mg by mouth every evening.  Dose:  500 mg     DULoxetine 60 MG Cpep delayed-release capsule  Commonly known as:  CYMBALTA   Take 60 mg by mouth 2 times a day.  Dose:  60 mg     Emgality 120 MG/ML Soaj  Generic drug:  Galcanezumab-gnlm   Inject 120 mg as instructed Q30 DAYS.  Dose:  120 mg     gabapentin 600 MG tablet  Commonly known as:  NEURONTIN   Take 600 mg by mouth 3 times a day.  Dose:  600 mg     ketorolac 60 MG/2ML Soln  Commonly known as:  TORADOL   60 mg by Intramuscular route 2 times a day as  "needed (headache).  Dose:  60 mg     risperiDONE 2 MG Tabs  Commonly known as:  RISPERDAL   Take 2 mg by mouth 2 times a day.  Dose:  2 mg         * This list has 2 medication(s) that are the same as other medications prescribed for you. Read the directions carefully, and ask your doctor or other care provider to review them with you.                Allergies  Allergies   Allergen Reactions   • Sumatriptan Anaphylaxis     Historical=\"Heart stops.\" Reaction  between 1995 to 1997   • Prochlorperazine Rash and Swelling     Tongue swelling. Reaction as a teen. (compazine)  Tolerated Phenergan on 02/24/15   • Vancomycin Shortness of Breath and Rash     Reaction in 2005.  D/W patient 8/31/15 - tolerated loading dose of vancomycin administered on 8/30/15 with some itching to chest with decreased infusion rate. Red Man Syndrome.  2018-tolerated slow drip with benadryl pre-med-had no problems.       DIET  Orders Placed This Encounter   Procedures   • Diet Order Regular     Standing Status:   Standing     Number of Occurrences:   1     Order Specific Question:   Diet:     Answer:   Regular [1]       ACTIVITY  As tolerated.  Weight bearing as tolerated    CONSULTATIONS  Neurology    PROCEDURES  None    LABORATORY  Lab Results   Component Value Date    SODIUM 136 02/23/2020    POTASSIUM 3.6 02/23/2020    CHLORIDE 103 02/23/2020    CO2 25 02/23/2020    GLUCOSE 94 02/23/2020    BUN 6 (L) 02/23/2020    CREATININE 0.51 02/23/2020        Lab Results   Component Value Date    WBC 9.5 02/23/2020    HEMOGLOBIN 11.0 (L) 02/23/2020    HEMATOCRIT 33.6 (L) 02/23/2020    PLATELETCT 219 02/23/2020        Total time of the discharge process exceeds 50 minutes.  "

## 2020-02-24 NOTE — PROGRESS NOTES
Report received from night RN. Pt resting in bed with ice packs on forehead. No s/s of distress. Bed locked and in lowest position.

## 2020-02-24 NOTE — DISCHARGE INSTRUCTIONS
Discharge Instructions    Discharged to home by car with relative. Discharged via wheelchair, hospital escort: Yes.  Special equipment needed: Not Applicable    Be sure to schedule a follow-up appointment with your primary care doctor or any specialists as instructed.     Discharge Plan:   Diet Plan: Discussed  Activity Level: Discussed  Confirmed Follow up Appointment: Appointment Scheduled  Confirmed Symptoms Management: Discussed  Medication Reconciliation Updated: Yes  Influenza Vaccine Indication: Not indicated: Previously immunized this influenza season and > 8 years of age    I understand that a diet low in cholesterol, fat, and sodium is recommended for good health. Unless I have been given specific instructions below for another diet, I accept this instruction as my diet prescription.   Other diet: Regular    Special Instructions: None    · Is patient discharged on Warfarin / Coumadin?   No     Depression / Suicide Risk    As you are discharged from this [facility] facility, it is important to learn how to keep safe from harming yourself.    Recognize the warning signs:  · Abrupt changes in personality, positive or negative- including increase in energy   · Giving away possessions  · Change in eating patterns- significant weight changes-  positive or negative  · Change in sleeping patterns- unable to sleep or sleeping all the time   · Unwillingness or inability to communicate  · Depression  · Unusual sadness, discouragement and loneliness  · Talk of wanting to die  · Neglect of personal appearance   · Rebelliousness- reckless behavior  · Withdrawal from people/activities they love  · Confusion- inability to concentrate     If you or a loved one observes any of these behaviors or has concerns about self-harm, here's what you can do:  · Talk about it- your feelings and reasons for harming yourself  · Remove any means that you might use to hurt yourself (examples: pills, rope, extension cords,  firearm)  · Get professional help from the community (Mental Health, Substance Abuse, psychological counseling)  · Do not be alone:Call your Safe Contact- someone whom you trust who will be there for you.  · Call your local CRISIS HOTLINE 480-1226 or 770-628-7878  · Call your local Children's Mobile Crisis Response Team [place] (601) 164-1710 or www.[employer]  · Call the toll free National Suicide Prevention Hotlines   · National Suicide Prevention Lifeline 411-706-RFLZ (7923)  · National Hope Line Network 800-SUICIDE (660-4627)

## 2020-02-24 NOTE — PROGRESS NOTES
Care assumed of patient by CAROLINA Denise. Patient resting in bed with IV caffeine infusing. Patient with c/o 10/10 headache and asking RN for IV Toradol and benadryl. RN discussed with patient that she will bring in medication when it is due. Patient verbalizes understanding. Will continue to monitor.

## 2020-02-24 NOTE — CARE PLAN
Problem: Pain Management  Goal: Pain level will decrease to patient's comfort goal  Outcome: PROGRESSING SLOWER THAN EXPECTED   Persistent headache despite PRNs and nonpharm modalities. Dr. Salomon aware.   Problem: Psychosocial Needs:  Goal: Level of anxiety will decrease  Outcome: PROGRESSING SLOWER THAN EXPECTED    at bedside- patient seems to have less anxiety.     Problem: Mobility  Goal: Risk for activity intolerance will decrease  Outcome: PROGRESSING SLOWER THAN EXPECTED   Refusing to ambulate in cao. Edge of bed for all meals. Up to bathroom multiple times with no difficulty. Neurovascular exam negative for deficits.     Problem: Safety  Goal: Will remain free from injury  Outcome: PROGRESSING AS EXPECTED   Seizure precautions.

## 2020-02-24 NOTE — CARE PLAN
Problem: Safety  Goal: Will remain free from falls  Outcome: PROGRESSING AS EXPECTED   Bed locked and in low position. Call bell in reach.   Problem: Knowledge Deficit  Goal: Knowledge of the prescribed therapeutic regimen will improve  Outcome: PROGRESSING AS EXPECTED   Will educate patient on plan of care and all medication administration.   Problem: Pain Management  Goal: Pain level will decrease to patient's comfort goal  Outcome: PROGRESSING SLOWER THAN EXPECTED   Patient with 10/10 pain despite interventions provided. Will continue to monitor pain level and administer medications ordered. Lights dimmed and ice applied to head for comfort.   Problem: Psychosocial Needs:  Goal: Level of anxiety will decrease  Outcome: PROGRESSING SLOWER THAN EXPECTED   Patient tearful. Emotional support provided.

## 2020-03-08 ENCOUNTER — APPOINTMENT (OUTPATIENT)
Dept: RADIOLOGY | Facility: MEDICAL CENTER | Age: 50
End: 2020-03-08
Attending: EMERGENCY MEDICINE
Payer: MEDICARE

## 2020-03-08 ENCOUNTER — HOSPITAL ENCOUNTER (OUTPATIENT)
Facility: MEDICAL CENTER | Age: 50
End: 2020-03-12
Attending: EMERGENCY MEDICINE | Admitting: INTERNAL MEDICINE
Payer: MEDICARE

## 2020-03-08 DIAGNOSIS — R53.1 LEFT-SIDED WEAKNESS: ICD-10-CM

## 2020-03-08 DIAGNOSIS — G43.911 INTRACTABLE MIGRAINE WITH STATUS MIGRAINOSUS, UNSPECIFIED MIGRAINE TYPE: ICD-10-CM

## 2020-03-08 DIAGNOSIS — G43.111 INTRACTABLE MIGRAINE WITH AURA WITH STATUS MIGRAINOSUS: ICD-10-CM

## 2020-03-08 DIAGNOSIS — Z86.73 H/O: CVA (CEREBROVASCULAR ACCIDENT): ICD-10-CM

## 2020-03-08 LAB
ABO GROUP BLD: NORMAL
ALBUMIN SERPL BCP-MCNC: 4.7 G/DL (ref 3.2–4.9)
ALBUMIN/GLOB SERPL: 1.5 G/DL
ALP SERPL-CCNC: 168 U/L (ref 30–99)
ALT SERPL-CCNC: 140 U/L (ref 2–50)
ANION GAP SERPL CALC-SCNC: 13 MMOL/L (ref 0–11.9)
APTT PPP: 24.7 SEC (ref 24.7–36)
AST SERPL-CCNC: 125 U/L (ref 12–45)
BASOPHILS # BLD AUTO: 0.8 % (ref 0–1.8)
BASOPHILS # BLD: 0.05 K/UL (ref 0–0.12)
BILIRUB SERPL-MCNC: 0.6 MG/DL (ref 0.1–1.5)
BLD GP AB SCN SERPL QL: NORMAL
BUN SERPL-MCNC: 10 MG/DL (ref 8–22)
CALCIUM SERPL-MCNC: 9.7 MG/DL (ref 8.5–10.5)
CHLORIDE SERPL-SCNC: 102 MMOL/L (ref 96–112)
CO2 SERPL-SCNC: 24 MMOL/L (ref 20–33)
CREAT SERPL-MCNC: 0.78 MG/DL (ref 0.5–1.4)
EKG IMPRESSION: NORMAL
EOSINOPHIL # BLD AUTO: 0.03 K/UL (ref 0–0.51)
EOSINOPHIL NFR BLD: 0.5 % (ref 0–6.9)
ERYTHROCYTE [DISTWIDTH] IN BLOOD BY AUTOMATED COUNT: 50.8 FL (ref 35.9–50)
GLOBULIN SER CALC-MCNC: 3.1 G/DL (ref 1.9–3.5)
GLUCOSE BLD-MCNC: 89 MG/DL (ref 65–99)
GLUCOSE SERPL-MCNC: 55 MG/DL (ref 65–99)
HCT VFR BLD AUTO: 41.2 % (ref 37–47)
HGB BLD-MCNC: 13.6 G/DL (ref 12–16)
IMM GRANULOCYTES # BLD AUTO: 0.02 K/UL (ref 0–0.11)
IMM GRANULOCYTES NFR BLD AUTO: 0.3 % (ref 0–0.9)
INR PPP: 0.99 (ref 0.87–1.13)
LYMPHOCYTES # BLD AUTO: 1.93 K/UL (ref 1–4.8)
LYMPHOCYTES NFR BLD: 29.7 % (ref 22–41)
MAGNESIUM SERPL-MCNC: 1.9 MG/DL (ref 1.5–2.5)
MCH RBC QN AUTO: 30 PG (ref 27–33)
MCHC RBC AUTO-ENTMCNC: 33 G/DL (ref 33.6–35)
MCV RBC AUTO: 90.7 FL (ref 81.4–97.8)
MONOCYTES # BLD AUTO: 0.42 K/UL (ref 0–0.85)
MONOCYTES NFR BLD AUTO: 6.5 % (ref 0–13.4)
NEUTROPHILS # BLD AUTO: 4.04 K/UL (ref 2–7.15)
NEUTROPHILS NFR BLD: 62.2 % (ref 44–72)
NRBC # BLD AUTO: 0 K/UL
NRBC BLD-RTO: 0 /100 WBC
PLATELET # BLD AUTO: 335 K/UL (ref 164–446)
PMV BLD AUTO: 10.2 FL (ref 9–12.9)
POTASSIUM SERPL-SCNC: 3.5 MMOL/L (ref 3.6–5.5)
PROT SERPL-MCNC: 7.8 G/DL (ref 6–8.2)
PROTHROMBIN TIME: 13.3 SEC (ref 12–14.6)
RBC # BLD AUTO: 4.54 M/UL (ref 4.2–5.4)
RH BLD: NORMAL
SODIUM SERPL-SCNC: 139 MMOL/L (ref 135–145)
TROPONIN T SERPL-MCNC: 7 NG/L (ref 6–19)
WBC # BLD AUTO: 6.5 K/UL (ref 4.8–10.8)

## 2020-03-08 PROCEDURE — 99220 PR INITIAL OBSERVATION CARE,LEVL III: CPT | Performed by: INTERNAL MEDICINE

## 2020-03-08 PROCEDURE — 70498 CT ANGIOGRAPHY NECK: CPT

## 2020-03-08 PROCEDURE — 85730 THROMBOPLASTIN TIME PARTIAL: CPT

## 2020-03-08 PROCEDURE — 700111 HCHG RX REV CODE 636 W/ 250 OVERRIDE (IP): Performed by: INTERNAL MEDICINE

## 2020-03-08 PROCEDURE — 0042T CT-CEREBRAL PERFUSION ANALYSIS: CPT

## 2020-03-08 PROCEDURE — 96376 TX/PRO/DX INJ SAME DRUG ADON: CPT | Mod: XU

## 2020-03-08 PROCEDURE — G0378 HOSPITAL OBSERVATION PER HR: HCPCS

## 2020-03-08 PROCEDURE — 86900 BLOOD TYPING SEROLOGIC ABO: CPT

## 2020-03-08 PROCEDURE — 700105 HCHG RX REV CODE 258: Performed by: INTERNAL MEDICINE

## 2020-03-08 PROCEDURE — 85610 PROTHROMBIN TIME: CPT

## 2020-03-08 PROCEDURE — 99285 EMERGENCY DEPT VISIT HI MDM: CPT

## 2020-03-08 PROCEDURE — 96372 THER/PROPH/DIAG INJ SC/IM: CPT | Mod: XU

## 2020-03-08 PROCEDURE — 700102 HCHG RX REV CODE 250 W/ 637 OVERRIDE(OP): Performed by: INTERNAL MEDICINE

## 2020-03-08 PROCEDURE — 700111 HCHG RX REV CODE 636 W/ 250 OVERRIDE (IP): Performed by: EMERGENCY MEDICINE

## 2020-03-08 PROCEDURE — 700117 HCHG RX CONTRAST REV CODE 255: Performed by: EMERGENCY MEDICINE

## 2020-03-08 PROCEDURE — 70450 CT HEAD/BRAIN W/O DYE: CPT

## 2020-03-08 PROCEDURE — 70496 CT ANGIOGRAPHY HEAD: CPT

## 2020-03-08 PROCEDURE — 96375 TX/PRO/DX INJ NEW DRUG ADDON: CPT | Mod: XU

## 2020-03-08 PROCEDURE — A9270 NON-COVERED ITEM OR SERVICE: HCPCS | Performed by: INTERNAL MEDICINE

## 2020-03-08 PROCEDURE — 71045 X-RAY EXAM CHEST 1 VIEW: CPT

## 2020-03-08 PROCEDURE — 85025 COMPLETE CBC W/AUTO DIFF WBC: CPT

## 2020-03-08 PROCEDURE — 86850 RBC ANTIBODY SCREEN: CPT

## 2020-03-08 PROCEDURE — 86901 BLOOD TYPING SEROLOGIC RH(D): CPT

## 2020-03-08 PROCEDURE — 83735 ASSAY OF MAGNESIUM: CPT

## 2020-03-08 PROCEDURE — 96374 THER/PROPH/DIAG INJ IV PUSH: CPT

## 2020-03-08 PROCEDURE — 93005 ELECTROCARDIOGRAM TRACING: CPT | Performed by: EMERGENCY MEDICINE

## 2020-03-08 PROCEDURE — 82962 GLUCOSE BLOOD TEST: CPT

## 2020-03-08 PROCEDURE — 80053 COMPREHEN METABOLIC PANEL: CPT

## 2020-03-08 PROCEDURE — 84484 ASSAY OF TROPONIN QUANT: CPT

## 2020-03-08 RX ORDER — POLYETHYLENE GLYCOL 3350 17 G/17G
1 POWDER, FOR SOLUTION ORAL
Status: DISCONTINUED | OUTPATIENT
Start: 2020-03-08 | End: 2020-03-13 | Stop reason: HOSPADM

## 2020-03-08 RX ORDER — DIVALPROEX SODIUM 500 MG/1
500 TABLET, DELAYED RELEASE ORAL EVERY EVENING
Status: DISCONTINUED | OUTPATIENT
Start: 2020-03-08 | End: 2020-03-12

## 2020-03-08 RX ORDER — AMOXICILLIN 250 MG
2 CAPSULE ORAL 2 TIMES DAILY
Status: DISCONTINUED | OUTPATIENT
Start: 2020-03-08 | End: 2020-03-13 | Stop reason: HOSPADM

## 2020-03-08 RX ORDER — DIPHENHYDRAMINE HYDROCHLORIDE 50 MG/ML
50 INJECTION INTRAMUSCULAR; INTRAVENOUS 4 TIMES DAILY PRN
Status: DISCONTINUED | OUTPATIENT
Start: 2020-03-08 | End: 2020-03-10

## 2020-03-08 RX ORDER — DIPHENHYDRAMINE HYDROCHLORIDE 50 MG/ML
25 INJECTION INTRAMUSCULAR; INTRAVENOUS ONCE
Status: COMPLETED | OUTPATIENT
Start: 2020-03-08 | End: 2020-03-08

## 2020-03-08 RX ORDER — METOCLOPRAMIDE HYDROCHLORIDE 5 MG/ML
10 INJECTION INTRAMUSCULAR; INTRAVENOUS ONCE
Status: COMPLETED | OUTPATIENT
Start: 2020-03-08 | End: 2020-03-08

## 2020-03-08 RX ORDER — HYDROMORPHONE HYDROCHLORIDE 1 MG/ML
1 INJECTION, SOLUTION INTRAMUSCULAR; INTRAVENOUS; SUBCUTANEOUS ONCE
Status: COMPLETED | OUTPATIENT
Start: 2020-03-08 | End: 2020-03-08

## 2020-03-08 RX ORDER — LEVETIRACETAM 500 MG/1
1250 TABLET ORAL 2 TIMES DAILY
Status: DISCONTINUED | OUTPATIENT
Start: 2020-03-08 | End: 2020-03-13 | Stop reason: HOSPADM

## 2020-03-08 RX ORDER — KETOROLAC TROMETHAMINE 30 MG/ML
30 INJECTION, SOLUTION INTRAMUSCULAR; INTRAVENOUS 2 TIMES DAILY PRN
Status: DISCONTINUED | OUTPATIENT
Start: 2020-03-08 | End: 2020-03-08

## 2020-03-08 RX ORDER — KETOROLAC TROMETHAMINE 30 MG/ML
30 INJECTION, SOLUTION INTRAMUSCULAR; INTRAVENOUS 2 TIMES DAILY PRN
Status: DISCONTINUED | OUTPATIENT
Start: 2020-03-08 | End: 2020-03-13 | Stop reason: HOSPADM

## 2020-03-08 RX ORDER — HYDROMORPHONE HYDROCHLORIDE 1 MG/ML
0.5 INJECTION, SOLUTION INTRAMUSCULAR; INTRAVENOUS; SUBCUTANEOUS EVERY 6 HOURS PRN
Status: COMPLETED | OUTPATIENT
Start: 2020-03-08 | End: 2020-03-09

## 2020-03-08 RX ORDER — DIVALPROEX SODIUM 250 MG/1
250-500 TABLET, DELAYED RELEASE ORAL 2 TIMES DAILY
Status: DISCONTINUED | OUTPATIENT
Start: 2020-03-08 | End: 2020-03-08

## 2020-03-08 RX ORDER — ACETAMINOPHEN 325 MG/1
650 TABLET ORAL EVERY 6 HOURS PRN
Status: DISCONTINUED | OUTPATIENT
Start: 2020-03-08 | End: 2020-03-13 | Stop reason: HOSPADM

## 2020-03-08 RX ORDER — RISPERIDONE 0.5 MG/1
2 TABLET ORAL 2 TIMES DAILY
Status: DISCONTINUED | OUTPATIENT
Start: 2020-03-08 | End: 2020-03-13 | Stop reason: HOSPADM

## 2020-03-08 RX ORDER — LEVETIRACETAM 500 MG/1
500 TABLET ORAL 2 TIMES DAILY
COMMUNITY
End: 2021-04-08

## 2020-03-08 RX ORDER — LEVETIRACETAM 500 MG/1
500 TABLET ORAL 2 TIMES DAILY
Status: DISCONTINUED | OUTPATIENT
Start: 2020-03-08 | End: 2020-03-08

## 2020-03-08 RX ORDER — BISACODYL 10 MG
10 SUPPOSITORY, RECTAL RECTAL
Status: DISCONTINUED | OUTPATIENT
Start: 2020-03-08 | End: 2020-03-13 | Stop reason: HOSPADM

## 2020-03-08 RX ORDER — SODIUM CHLORIDE 9 MG/ML
INJECTION, SOLUTION INTRAVENOUS CONTINUOUS
Status: DISCONTINUED | OUTPATIENT
Start: 2020-03-08 | End: 2020-03-11

## 2020-03-08 RX ORDER — DIVALPROEX SODIUM 250 MG/1
250 TABLET, DELAYED RELEASE ORAL 2 TIMES DAILY
Status: DISCONTINUED | OUTPATIENT
Start: 2020-03-09 | End: 2020-03-12

## 2020-03-08 RX ADMIN — DIVALPROEX SODIUM 500 MG: 500 TABLET, DELAYED RELEASE ORAL at 20:20

## 2020-03-08 RX ADMIN — RISPERIDONE 2 MG: 0.5 TABLET ORAL at 20:19

## 2020-03-08 RX ADMIN — HYDROMORPHONE HYDROCHLORIDE 1 MG: 1 INJECTION, SOLUTION INTRAMUSCULAR; INTRAVENOUS; SUBCUTANEOUS at 17:16

## 2020-03-08 RX ADMIN — IOHEXOL 80 ML: 350 INJECTION, SOLUTION INTRAVENOUS at 15:31

## 2020-03-08 RX ADMIN — LEVETIRACETAM 1250 MG: 500 TABLET ORAL at 21:29

## 2020-03-08 RX ADMIN — SODIUM CHLORIDE: 9 INJECTION, SOLUTION INTRAVENOUS at 20:22

## 2020-03-08 RX ADMIN — KETOROLAC TROMETHAMINE 30 MG: 30 INJECTION, SOLUTION INTRAMUSCULAR at 21:19

## 2020-03-08 RX ADMIN — IOHEXOL 40 ML: 350 INJECTION, SOLUTION INTRAVENOUS at 15:30

## 2020-03-08 RX ADMIN — DIPHENHYDRAMINE HYDROCHLORIDE 25 MG: 50 INJECTION, SOLUTION INTRAMUSCULAR; INTRAVENOUS at 16:15

## 2020-03-08 RX ADMIN — HYDROMORPHONE HYDROCHLORIDE 0.5 MG: 1 INJECTION, SOLUTION INTRAMUSCULAR; INTRAVENOUS; SUBCUTANEOUS at 20:19

## 2020-03-08 RX ADMIN — METOCLOPRAMIDE 10 MG: 5 INJECTION, SOLUTION INTRAMUSCULAR; INTRAVENOUS at 16:15

## 2020-03-08 RX ADMIN — DIPHENHYDRAMINE HYDROCHLORIDE 50 MG: 50 INJECTION INTRAMUSCULAR; INTRAVENOUS at 21:20

## 2020-03-08 ASSESSMENT — FIBROSIS 4 INDEX: FIB4 SCORE: 0.41

## 2020-03-08 NOTE — H&P
Hospital Medicine History & Physical Note    Date of Service  3/8/2020    Primary Care Physician  Elliott Power M.D.    Consultants  None    Code Status  Full code    Chief Complaint    Headache for 1 week    History of Presenting Illness    49 y.o. female with past medical history of migraine headache and seizure disorder who presented to hospital on 3/8/2020 with complaint of headache for 1 week.  She reported she has been having headache for last few weeks but in the last 1 week intensity of headache has been progressively getting worse.  She reported her headache is on right side of head and it radiates towards the left side of her head.  She also reported symptoms of tingling in her left face, left arm and left foot and she feels heavy on the left extremities that is started this morning around 3 AM.  She denies any symptoms of weakness but she expressed that when she lift her arm she feels like she is lifting weight.  She reported that she was hospitalized approximately 3 weeks ago and she was evaluated by neurologist at that time she has not seen neurologist as outpatient.  She also has a history of seizure disorder and her last seizure was 6 months ago.  She expressed that her pain improved with ketamine.  Her pain aggravated with lights.  She has been trying marijuana both smoking and edible and its slightly help in her symptoms of pain but not significant improvement.  She also has episode of vomiting this morning.  Patient has a spinal stimulator and she is not sure whether is compatible with MRI or not.    I discussed about this admission with ED physician Dr. Degroot      I discussed with her regarding MRI brain without contrast and she expressed that she has a spinal stimulator and she is not sure whether it is compatible or not.    Review of Systems  Review of Systems   Constitutional: Positive for malaise/fatigue. Negative for chills, fever and weight loss.   HENT: Negative for hearing loss  and tinnitus.    Eyes: Negative for blurred vision, double vision, photophobia and pain.   Respiratory: Negative for cough, sputum production and shortness of breath.    Cardiovascular: Negative for chest pain, palpitations, orthopnea and leg swelling.   Gastrointestinal: Negative for abdominal pain, constipation, diarrhea, nausea and vomiting.   Genitourinary: Negative for dysuria, frequency and urgency.   Musculoskeletal: Negative for back pain, joint pain, myalgias and neck pain.   Skin: Negative for rash.   Neurological: Positive for tingling, sensory change, focal weakness and headaches. Negative for dizziness, tremors and speech change.   Psychiatric/Behavioral: Negative for hallucinations and substance abuse.   All other systems reviewed and are negative.      Past Medical History   has a past medical history of Allergy, unspecified not elsewhere classified, Anemia (10/05/2017), Anesthesia, Anxiety, Anxiety, generalized, Arthritis, autoimmune, Autoimmune disorder (formerly [facility]), Back pain, Clostridium difficile diarrhea (2014), Depression, Elevated liver enzymes (2017), Fall, H/O Clostridium difficile infection (2014), MRSA infection (2003), Hydrocephalus (formerly [facility]) (2015), Joint pain (09/10/2019), Migraine with aura, with intractable migraine, so stated, without mention of status migrainosus, MRSA (methicillin resistant Staphylococcus aureus) (08/2015), MRSA (methicillin resistant Staphylococcus aureus) (12/2017), MRSA (methicillin resistant Staphylococcus aureus) (2018), Pituitary abnormality (formerly [facility]) (2002), Requires supplemental oxygen, Seizure (formerly [facility]) (started 2017), Staph infection, and Stroke (formerly [facility]) (2007).    Surgical History   has a past surgical history that includes tonsillectomy (1985); other neurological surg (6734-6068); irrigation & debridement neuro (N/A, 6/28/2015); lumbar exploration (N/A, 8/31/2015); spinal cord stimulator (12/19/2016);  shunt insertion (5/31/2017); liver biopsy (07/24/2017); cholecystectomy  "(); appendectomy (); spinal cord stimulator (2018); spinal cord stimulator (); knee arthroscopy (Right, ); full thickness skin graft (Left, 2018); other surgical procedure (11/10/2017); other neurological surg (2015); endometrial ablation (); tubal coagulation laparoscopic bilateral (Bilateral, ); pr implant neurostim/ (2019); and other (Left, 2020).     Family History  family history includes Alcohol abuse in her father; Diabetes in her father; Mult Sclerosis in her mother.     Social History   reports that she has never smoked. She has never used smokeless tobacco. She reports previous alcohol use. She reports that she does not use drugs.    Allergies  Allergies   Allergen Reactions   • Sumatriptan Anaphylaxis     Historical=\"Heart stops.\" Reaction  between  to    • Prochlorperazine Rash and Swelling     Tongue swelling. Reaction as a teen. (compazine)  Tolerated Phenergan on 02/24/15   • Vancomycin Shortness of Breath and Rash     Reaction in .  D/W patient 8/31/15 - tolerated loading dose of vancomycin administered on 8/30/15 with some itching to chest with decreased infusion rate. Red Man Syndrome.  -tolerated slow drip with benadryl pre-med-had no problems.       Medications  Prior to Admission Medications   Prescriptions Last Dose Informant Patient Reported? Taking?   EMGALITY 120 MG/ML Solution Auto-injector 2/10/2020 at Unknown time Patient Yes No   Sig: Inject 120 mg as instructed Q30 DAYS.   aspirin (ASA) 81 MG Chew Tab chewable tablet 3/7/2020 at hs Patient Yes No   Sig: Take 81 mg by mouth every bedtime.   diphenhydrAMINE (BENADRYL) 50 MG/ML Solution 3/8/2020 at 1000 Patient Yes No   Si mg by Intramuscular route 4 times a day as needed (for migraines).   divalproex (DEPAKOTE) 250 MG Tablet Delayed Response 3/8/2020 at 1200 Patient Yes No   Sig: Take 250-500 mg by mouth 2 Times a Day. 250 mg in the morning, 250 mg at 1200, and " 500 mg at bedtime   ketorolac (TORADOL) 60 MG/2ML Solution 3/8/2020 at 1000 Patient Yes No   Si mg by Intramuscular route 2 times a day as needed (headache).   levETIRAcetam (KEPPRA) 500 MG Tab 3/8/2020 at 1200  Yes Yes   Sig: Take 500 mg by mouth 2 times a day.   levETIRAcetam (KEPPRA) 750 MG tablet 3/8/2020 at 1200  No No   Sig: Take 1 Tab by mouth 2 Times a Day.   risperiDONE (RISPERDAL) 2 MG Tab 3/8/2020 at 1200 Patient Yes No   Sig: Take 2 mg by mouth 2 times a day.      Facility-Administered Medications: None       Physical Exam  Temp:  [35.9 °C (96.6 °F)] 35.9 °C (96.6 °F)  Pulse:  [117-120] 117  Resp:  [16] 16  BP: (103-140)/(67-86) 103/67  SpO2:  [94 %-97 %] 94 %    Physical Exam  Vitals signs reviewed.   Constitutional:       General: She is in acute distress (Due to headache).      Appearance: She is obese. She is not ill-appearing.   HENT:      Head: Normocephalic and atraumatic.      Nose: No congestion.   Eyes:      General:         Right eye: No discharge.         Left eye: No discharge.      Pupils: Pupils are equal, round, and reactive to light.   Neck:      Musculoskeletal: Normal range of motion. No neck rigidity.   Cardiovascular:      Rate and Rhythm: Normal rate and regular rhythm.      Pulses: Normal pulses.      Heart sounds: Normal heart sounds. No murmur.   Pulmonary:      Effort: Pulmonary effort is normal. No respiratory distress.      Breath sounds: Normal breath sounds. No stridor.   Abdominal:      General: Bowel sounds are normal. There is no distension.      Palpations: Abdomen is soft.      Tenderness: There is no abdominal tenderness.   Musculoskeletal: Normal range of motion.         General: No swelling or tenderness.   Skin:     General: Skin is warm.      Capillary Refill: Capillary refill takes less than 2 seconds.      Coloration: Skin is not jaundiced or pale.      Findings: No bruising.   Neurological:      General: No focal deficit present.      Mental Status: She is  alert and oriented to person, place, and time.      Cranial Nerves: No cranial nerve deficit.      Sensory: Sensory deficit (Left arm and left foot) present.      Comments: No focal motor neurological deficit.   Psychiatric:         Mood and Affect: Mood normal.         Behavior: Behavior normal.         Laboratory:  Recent Labs     03/08/20  1456   WBC 6.5   RBC 4.54   HEMOGLOBIN 13.6   HEMATOCRIT 41.2   MCV 90.7   MCH 30.0   MCHC 33.0*   RDW 50.8*   PLATELETCT 335   MPV 10.2     Recent Labs     03/08/20  1456   SODIUM 139   POTASSIUM 3.5*   CHLORIDE 102   CO2 24   GLUCOSE 55*   BUN 10   CREATININE 0.78   CALCIUM 9.7     Recent Labs     03/08/20  1456   ALTSGPT 140*   ASTSGOT 125*   ALKPHOSPHAT 168*   TBILIRUBIN 0.6   GLUCOSE 55*     Recent Labs     03/08/20  1456   APTT 24.7   INR 0.99     No results for input(s): NTPROBNP in the last 72 hours.      Recent Labs     03/08/20  1456   TROPONINT 7       Urinalysis:    No results found     Imaging:  CT-CTA NECK WITH & W/O-POST PROCESSING   Final Result      1.  Patent carotid and vertebral arteries. No evidence of occlusion or dissection.      2.  Intraspinal pain treatment lines present.      DX-CHEST-PORTABLE (1 VIEW)   Final Result         No acute cardiac or pulmonary abnormality is identified.      CT-CTA HEAD WITH & W/O-POST PROCESS   Final Result      CT angiogram of the [place] within normal limits.      CT-HEAD W/O   Final Result      1.  No evidence of acute intracranial process.      2.  Right frontal ventriculoperitoneal shunt catheter present. The ventricular volume is unchanged.      MR-BRAIN-W/O    (Results Pending)   EC-ECHOCARDIOGRAM COMPLETE W/O CONT    (Results Pending)         Assessment/Plan:  I anticipate this patient is appropriate for observation status at this time.    Complicated migraine- (present on admission)  Assessment & Plan  She has history of complicated migraine and she reported she has prior history of sensory changes  associated with her headache.  Due to her sensory changes I started her on non-TPA stroke protocol.  She underwent CTA head and neck and did not show any acute abnormalities  CT head without contrast did not show any acute or maladies per  I ordered MRI brain without contrast  Also ordered echocardiogram.  Due to symptoms of weakness and tingling on left upper and lower extremity  Every 4 hours neuro check.  I started on pain medication and monitor for adverse effect of pain medications per  Continue outpatient medications         Depression- (present on admission)  Assessment & Plan  Continue outpatient medications.      VTE prophylaxis: SCDs

## 2020-03-08 NOTE — ED TRIAGE NOTES
"Chief Complaint   Patient presents with   • Migraine     x a month   • Weakness     left sided, since 3am. \"it started out tingly, like pins and needles, now its gone numb. I've had one full blown stroke and several TIA's\"        Pt ambulatory to triage for above complaint.     Pt is alert/oriented and follows commands. Pt speaking in full sentences and responds appropriately to questions. No acute distress noted in triage and respirations are even and unlabored.     Charge RN aware of pt. Pt taken to charge desk for stroke IR. Pt encouraged to alert staff for any changes in condition.    "

## 2020-03-08 NOTE — ED PROVIDER NOTES
"ED Provider Note    CHIEF COMPLAINT  Chief Complaint   Patient presents with   • Migraine     x a month   • Weakness     left sided, since 3am. \"it started out tingly, like pins and needles, now its gone numb. I've had one full blown stroke and several TIA's\"        HPI  Enedelia Pang is a 49 y.o. female who presents to the emergency department complaining of a migraine headache, and left-sided weakness and numbness.  Patient had a left-sided headache for about a month.  Describes it as a typical migraine headache.  Is gradually worsening.  It is been associated with a sensation of numbness and tingling in her left arm and left leg since last night.  Is now assist with heaviness in the left arm and left leg.  Weakness and numbness are more pronounced in left upper extremity and left lower extremity.  Denies any problems with speaking but has a problem with thinking and word finding.  She denies any other acute concerns or complaints.  No falls or trauma.    REVIEW OF SYSTEMS  See HPI for further details. All other systems are negative.    PAST MEDICAL HISTORY  Past Medical History:   Diagnosis Date   • Allergy, unspecified not elsewhere classified    • Anemia 10/05/2017    Unsure if a current issue.   • Anesthesia     Migrane after anesthesia   • Anxiety    • Anxiety, generalized    • Arthritis    • autoimmune    • Autoimmune disorder (HCC)     Unknown etiology or type   • Back pain    • Clostridium difficile diarrhea 2014    follow up tests negative   • Depression    • Elevated liver enzymes 2017    Related to meds.   • Fall    • H/O Clostridium difficile infection 2014   • Hx MRSA infection 2003    with neurostimulator implant   • Hydrocephalus (HCC) 2015    with lumbar shunt   • Joint pain 09/10/2019    Especially right shoulder   • Migraine with aura, with intractable migraine, so stated, without mention of status migrainosus     from undefined autoimmune issue   • MRSA (methicillin resistant Staphylococcus " aureus) 08/2015    to lumbar shunt and power port   • MRSA (methicillin resistant Staphylococcus aureus) 12/2017    left foot autoimmune decay   • MRSA (methicillin resistant Staphylococcus aureus) 2018    to neurostimulator.    • Pituitary abnormality (HCC) 2002   • Requires supplemental oxygen     to treat migraines. 2-5L/NC   • Seizure (HCC) started 2017    Unknown etiology-not sure if related to autoimmune issue. Last seizure 2/2019   • Staph infection    • Stroke (HCC) 2007     with right side residual weakness       FAMILY HISTORY  Family History   Problem Relation Age of Onset   • Mult Sclerosis Mother    • Diabetes Father    • Alcohol abuse Father        SOCIAL HISTORY  Social History     Socioeconomic History   • Marital status:      Spouse name: Not on file   • Number of children: Not on file   • Years of education: Not on file   • Highest education level: Not on file   Occupational History   • Occupation:      Comment: on disability   Social Needs   • Financial resource strain: Not on file   • Food insecurity     Worry: Often true     Inability: Often true   • Transportation needs     Medical: Yes     Non-medical: Yes   Tobacco Use   • Smoking status: Never Smoker   • Smokeless tobacco: Never Used   Substance and Sexual Activity   • Alcohol use: Not Currently     Frequency: Never     Binge frequency: Never   • Drug use: Never   • Sexual activity: Not on file   Lifestyle   • Physical activity     Days per week: Not on file     Minutes per session: Not on file   • Stress: Not on file   Relationships   • Social connections     Talks on phone: Not on file     Gets together: Not on file     Attends Shinto service: Not on file     Active member of club or organization: Not on file     Attends meetings of clubs or organizations: Not on file     Relationship status: Not on file   • Intimate partner violence     Fear of current or ex partner: Not on file     Emotionally abused: Not on file      Physically abused: Not on file     Forced sexual activity: Not on file   Other Topics Concern   • Not on file   Social History Narrative   • Not on file       SURGICAL HISTORY  Past Surgical History:   Procedure Laterality Date   • OTHER Left 2020    Port placement left chest   • PB IMPLANT NEUROSTIM/  2019    Procedure: INSERTION, NEUROSTIMULATOR, PERMANENT, SPINAL CORD;  Surgeon: Seven Mahoney M.D.;  Location: SURGERY [facility];  Service: Pain Management   • SPINAL CORD STIMULATOR  2018    Explanted   • FULL THICKNESS SKIN GRAFT Left 2018     graft to foot (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   • OTHER SURGICAL PROCEDURE  11/10/2017    Power port   • LIVER BIOPSY  2017   •  SHUNT INSERTION  2017    Procedure:  SHUNT INSERTION;  Surgeon: Drew Berry M.D.;  Location: [facility];  Service:    • SPINAL CORD STIMULATOR  2016    Procedure: SPINAL CORD STIMULATOR FOR: PLACEMENT OF LEFT FLANK OCCIPITAL NERVE STIMULATOR BATTERY PACK;  Surgeon: Oli Oh III, M.D.;  Location: SURGERY [facility];  Service:    • LUMBAR EXPLORATION N/A 2015    Procedure: LUMBAR EXPLORATION- Lumbar shunt removal ;  Surgeon: Oli Oh III, M.D.;  Location: SURGERY [facility];  Service:    • OTHER NEUROLOGICAL SURG  2015    Explanted lumbar shunt and explanted power port due to MRSA   • IRRIGATION & DEBRIDEMENT NEURO N/A 2015    Procedure: IRRIGATION & DEBRIDEMENT NEURO;  Surgeon: Oli Oh III, M.D.;  Location: SURGERY [facility];  Service:    • SPINAL CORD STIMULATOR      1st implant   • CHOLECYSTECTOMY      had ruptured day before   • ENDOMETRIAL ABLATION     • TUBAL COAGULATION LAPAROSCOPIC BILATERAL Bilateral    • KNEE ARTHROSCOPY Right    • TONSILLECTOMY     • APPENDECTOMY     • OTHER NEUROLOGICAL SURG  [id number]    occipital nerve stimulator-revisions for total of 9  "procedures       CURRENT MEDICATIONS  Home Medications     Reviewed by Drew Morales R.N. (Registered Nurse) on 03/08/20 at 1438  Med List Status: Partial   Medication Last Dose Status   amitriptyline (ELAVIL) 10 MG Tab  Active   aspirin (ASA) 81 MG Chew Tab chewable tablet  Active   diphenhydrAMINE (BENADRYL) 50 MG/ML Solution  Active   divalproex (DEPAKOTE) 250 MG Tablet Delayed Response  Active   divalproex (DEPAKOTE) 500 MG Tablet Delayed Response  Active   duloxetine (CYMBALTA) 60 MG CPEP delayed-release capsule  Active   EMGALITY 120 MG/ML Solution Auto-injector  Active   gabapentin (NEURONTIN) 600 MG tablet  Active   ketorolac (TORADOL) 60 MG/2ML Solution  Active   levETIRAcetam (KEPPRA) 750 MG tablet  Active   risperiDONE (RISPERDAL) 2 MG Tab  Active                ALLERGIES  Allergies   Allergen Reactions   • Sumatriptan Anaphylaxis     Historical=\"Heart stops.\" Reaction  between 1995 to 1997   • Prochlorperazine Rash and Swelling     Tongue swelling. Reaction as a teen. (compazine)  Tolerated Phenergan on 02/24/15   • Vancomycin Shortness of Breath and Rash     Reaction in 2005.  D/W patient 8/31/15 - tolerated loading dose of vancomycin administered on 8/30/15 with some itching to chest with decreased infusion rate. Red Man Syndrome.  2018-tolerated slow drip with benadryl pre-med-had no problems.       PHYSICAL EXAM  VITAL SIGNS: /86   Pulse (!) 120   Temp 35.9 °C (96.6 °F) (Temporal)   Resp 16   Ht 1.6 m (5' 3\")   Wt 74 kg (163 lb 2.3 oz)   SpO2 97%   BMI 28.90 kg/m²    Constitutional: Awake alert appears in No Acute Distress  HENT: Normocephalic, Atraumatic, Bilateral external ears normal, Oropharynx moist, No oral exudates, Nose normal.   Eyes: PERRL, EOMI, Conjunctiva normal, No discharge.   Neck: Normal range of motion, No tenderness, Supple, No stridor.   Cardiovascular: Normal heart rate, Normal rhythm, No murmurs, No rubs, No gallops.   Thorax & Lungs: Normal breath sounds, No " respiratory distress, No wheezing,  Abdomen: Bowel sounds normal, Soft, No tenderness,   Skin: Warm, Dry, No erythema, No rash.   Back: No tenderness, No CVA tenderness.   Musculoskeletal: Good range of motion in all major joints.   Neurologic: Alert, cranial nerves II through XII are intact.  No pronator drift in upper extremities.  Subtle drift in left leg.  Decreased incision left upper and left lower extremity.  Normal finger-to-nose of both hands.  Normal heel-to-shin both sides.  Subjectively leg is heavy.  NIH stroke score is 3 as documented.  Psychiatric: Affect normal      Results for orders placed or performed during the hospital encounter of 03/08/20   CBC WITH DIFFERENTIAL   Result Value Ref Range    WBC 6.5 4.8 - 10.8 K/uL    RBC 4.54 4.20 - 5.40 M/uL    Hemoglobin 13.6 12.0 - 16.0 g/dL    Hematocrit 41.2 37.0 - 47.0 %    MCV 90.7 81.4 - 97.8 fL    MCH 30.0 27.0 - 33.0 pg    MCHC 33.0 (L) 33.6 - 35.0 g/dL    RDW 50.8 (H) 35.9 - 50.0 fL    Platelet Count 335 164 - 446 K/uL    MPV 10.2 9.0 - 12.9 fL    Neutrophils-Polys 62.20 44.00 - 72.00 %    Lymphocytes 29.70 22.00 - 41.00 %    Monocytes 6.50 0.00 - 13.40 %    Eosinophils 0.50 0.00 - 6.90 %    Basophils 0.80 0.00 - 1.80 %    Immature Granulocytes 0.30 0.00 - 0.90 %    Nucleated RBC 0.00 /100 WBC    Neutrophils (Absolute) 4.04 2.00 - 7.15 K/uL    Lymphs (Absolute) 1.93 1.00 - 4.80 K/uL    Monos (Absolute) 0.42 0.00 - 0.85 K/uL    Eos (Absolute) 0.03 0.00 - 0.51 K/uL    Baso (Absolute) 0.05 0.00 - 0.12 K/uL    Immature Granulocytes (abs) 0.02 0.00 - 0.11 K/uL    NRBC (Absolute) 0.00 K/uL   COMP METABOLIC PANEL   Result Value Ref Range    Sodium 139 135 - 145 mmol/L    Potassium 3.5 (L) 3.6 - 5.5 mmol/L    Chloride 102 96 - 112 mmol/L    Co2 24 20 - 33 mmol/L    Anion Gap 13.0 (H) 0.0 - 11.9    Glucose 55 (L) 65 - 99 mg/dL    Bun 10 8 - 22 mg/dL    Creatinine 0.78 0.50 - 1.40 mg/dL    Calcium 9.7 8.5 - 10.5 mg/dL    AST(SGOT) 125 (H) 12 - 45 U/L     ALT(SGPT) 140 (H) 2 - 50 U/L    Alkaline Phosphatase 168 (H) 30 - 99 U/L    Total Bilirubin 0.6 0.1 - 1.5 mg/dL    Albumin 4.7 3.2 - 4.9 g/dL    Total Protein 7.8 6.0 - 8.2 g/dL    Globulin 3.1 1.9 - 3.5 g/dL    A-G Ratio 1.5 g/dL   PROTHROMBIN TIME   Result Value Ref Range    PT 13.3 12.0 - 14.6 sec    INR 0.99 0.87 - 1.13   APTT   Result Value Ref Range    APTT 24.7 24.7 - 36.0 sec   COD (ADULT)   Result Value Ref Range    ABO Grouping Only A     Rh Grouping Only POS     Antibody Screen-Cod NEG    TROPONIN   Result Value Ref Range    Troponin T 7 6 - 19 ng/L   ESTIMATED GFR   Result Value Ref Range    GFR If African American >60 >60 mL/min/1.73 m 2    GFR If Non African American >60 >60 mL/min/1.73 m 2   ACCU-CHEK GLUCOSE   Result Value Ref Range    Glucose - Accu-Ck 89 65 - 99 mg/dL   EKG (NOW)   Result Value Ref Range    Report       [facility] Emergency Dept.    Test Date:  2020  Pt Name:    BRIGID DAWSON                 Department: ER  MRN:        [id number]                      Room:       [facility]  Gender:     Female                       Technician: [id number]  :        1970                   Requested By:HEDY BARTLETT  Order #:    [id number]                    Reading MD: HEDY BARTLETT. ADEEL    Measurements  Intervals                                Axis  Rate:       113                          P:          59  MI:         144                          QRS:        -10  QRSD:       86                           T:          2  QT:         336  QTc:        461    Interpretive Statements  SINUS TACHYCARDIA  BORDERLINE T ABNORMALITIES, DIFFUSE LEADS  Compared to ECG 2020 15:22:16  T-wave abnormality now present  Electronically Signed On 3-8-2020 16:35:32 PDT by HEDY BARTLETT. AMD          RADIOLOGY/PROCEDURES  CT-CTA NECK WITH & W/O-POST PROCESSING   Final Result      1.  Patent carotid and vertebral arteries. No evidence of occlusion or dissection.      2.  Intraspinal pain  treatment lines present.      DX-CHEST-PORTABLE (1 VIEW)   Final Result         No acute cardiac or pulmonary abnormality is identified.      CT-CTA HEAD WITH & W/O-POST PROCESS   Final Result      CT angiogram of the [place] within normal limits.      CT-HEAD W/O   Final Result      1.  No evidence of acute intracranial process.      2.  Right frontal ventriculoperitoneal shunt catheter present. The ventricular volume is unchanged.            COURSE & MEDICAL DECISION MAKING  Pertinent Labs & Imaging studies reviewed. (See chart for details)  The patient presents to the emergency department for evaluation of left-sided weakness and a headache.  She has a history of TIA CVA, and complicated migraines.  The differential diagnosis remains the same as her history.    I cannot exclude a stroke therefore she is worked up with a stroke protocol.    CTAs of the head and neck do not show any occlusion.  There is no evidence of any clot that would benefit from thrombectomy.    Patient is given migraine therapy with minimal relief.  I then ordered some Dilaudid for pain.  Differential diagnosis still remains complex migraine, TIA versus CVA.  The patient requires hospitalization for ongoing work-up and treatment.    She will also require ongoing migraine therapy.  Patient be hospitalized for continued work-up and treatment discussed case with the hospitalist and care distress at that time.    The patient is not a candidate for alteplase.  Symptoms have been present for more than 12 hours.    Care transferred to the hospitalist    FINAL IMPRESSION  1. Intractable migraine with status migrainosus, unspecified migraine type     2. Left-sided weakness     3. H/O: CVA (cerebrovascular accident)         2.   3.         Electronically signed by: Silvio Degroot M.D., 3/8/2020 3:49 PM

## 2020-03-09 ENCOUNTER — APPOINTMENT (OUTPATIENT)
Dept: CARDIOLOGY | Facility: MEDICAL CENTER | Age: 50
End: 2020-03-09
Attending: INTERNAL MEDICINE
Payer: MEDICARE

## 2020-03-09 PROBLEM — E86.0 DEHYDRATION: Status: ACTIVE | Noted: 2020-03-09

## 2020-03-09 PROBLEM — R53.1 LEFT-SIDED WEAKNESS: Status: ACTIVE | Noted: 2020-03-09

## 2020-03-09 LAB
ALBUMIN SERPL BCP-MCNC: 3.4 G/DL (ref 3.2–4.9)
ALBUMIN/GLOB SERPL: 1.4 G/DL
ALP SERPL-CCNC: 154 U/L (ref 30–99)
ALT SERPL-CCNC: 139 U/L (ref 2–50)
ANION GAP SERPL CALC-SCNC: 9 MMOL/L (ref 0–11.9)
AST SERPL-CCNC: 78 U/L (ref 12–45)
BASOPHILS # BLD AUTO: ABNORMAL % (ref 0–1.8)
BASOPHILS # BLD: ABNORMAL K/UL (ref 0–0.12)
BILIRUB SERPL-MCNC: 0.4 MG/DL (ref 0.1–1.5)
BUN SERPL-MCNC: 7 MG/DL (ref 8–22)
CALCIUM SERPL-MCNC: 8.7 MG/DL (ref 8.5–10.5)
CHLORIDE SERPL-SCNC: 106 MMOL/L (ref 96–112)
CO2 SERPL-SCNC: 24 MMOL/L (ref 20–33)
CREAT SERPL-MCNC: 0.67 MG/DL (ref 0.5–1.4)
EOSINOPHIL # BLD AUTO: ABNORMAL K/UL (ref 0–0.51)
EOSINOPHIL NFR BLD: ABNORMAL % (ref 0–6.9)
ERYTHROCYTE [DISTWIDTH] IN BLOOD BY AUTOMATED COUNT: 50 FL (ref 35.9–50)
GLOBULIN SER CALC-MCNC: 2.5 G/DL (ref 1.9–3.5)
GLUCOSE SERPL-MCNC: 102 MG/DL (ref 65–99)
HCT VFR BLD AUTO: 35.7 % (ref 37–47)
HGB BLD-MCNC: 11.7 G/DL (ref 12–16)
IMM GRANULOCYTES # BLD AUTO: ABNORMAL K/UL (ref 0–0.11)
IMM GRANULOCYTES NFR BLD AUTO: ABNORMAL % (ref 0–0.9)
LV EJECT FRACT  99904: 65
LV EJECT FRACT MOD 2C 99903: 64.49
LV EJECT FRACT MOD 4C 99902: 66.13
LV EJECT FRACT MOD BP 99901: 64.37
LYMPHOCYTES # BLD AUTO: ABNORMAL K/UL (ref 1–4.8)
LYMPHOCYTES NFR BLD: ABNORMAL % (ref 22–41)
MCH RBC QN AUTO: 29.5 PG (ref 27–33)
MCHC RBC AUTO-ENTMCNC: 32.8 G/DL (ref 33.6–35)
MCV RBC AUTO: 89.9 FL (ref 81.4–97.8)
MONOCYTES # BLD AUTO: ABNORMAL K/UL (ref 0–0.85)
MONOCYTES NFR BLD AUTO: ABNORMAL % (ref 0–13.4)
NEUTROPHILS # BLD AUTO: ABNORMAL K/UL (ref 2–7.15)
NEUTROPHILS NFR BLD: ABNORMAL % (ref 44–72)
NRBC # BLD AUTO: 0 K/UL
NRBC BLD-RTO: 0 /100 WBC
PLATELET # BLD AUTO: 245 K/UL (ref 164–446)
PMV BLD AUTO: 10.4 FL (ref 9–12.9)
POTASSIUM SERPL-SCNC: 4.1 MMOL/L (ref 3.6–5.5)
PROT SERPL-MCNC: 5.9 G/DL (ref 6–8.2)
RBC # BLD AUTO: 3.97 M/UL (ref 4.2–5.4)
SODIUM SERPL-SCNC: 139 MMOL/L (ref 135–145)
WBC # BLD AUTO: 4 K/UL (ref 4.8–10.8)

## 2020-03-09 PROCEDURE — 80053 COMPREHEN METABOLIC PANEL: CPT

## 2020-03-09 PROCEDURE — 96372 THER/PROPH/DIAG INJ SC/IM: CPT | Mod: XU

## 2020-03-09 PROCEDURE — 85025 COMPLETE CBC W/AUTO DIFF WBC: CPT

## 2020-03-09 PROCEDURE — 700111 HCHG RX REV CODE 636 W/ 250 OVERRIDE (IP): Performed by: INTERNAL MEDICINE

## 2020-03-09 PROCEDURE — 700111 HCHG RX REV CODE 636 W/ 250 OVERRIDE (IP): Performed by: HOSPITALIST

## 2020-03-09 PROCEDURE — 96365 THER/PROPH/DIAG IV INF INIT: CPT | Mod: XU

## 2020-03-09 PROCEDURE — 97161 PT EVAL LOW COMPLEX 20 MIN: CPT

## 2020-03-09 PROCEDURE — 700105 HCHG RX REV CODE 258: Performed by: INTERNAL MEDICINE

## 2020-03-09 PROCEDURE — 99226 PR SUBSEQUENT OBSERVATION CARE,LEVEL III: CPT | Performed by: HOSPITALIST

## 2020-03-09 PROCEDURE — 93306 TTE W/DOPPLER COMPLETE: CPT | Mod: 26 | Performed by: INTERNAL MEDICINE

## 2020-03-09 PROCEDURE — 700102 HCHG RX REV CODE 250 W/ 637 OVERRIDE(OP): Performed by: INTERNAL MEDICINE

## 2020-03-09 PROCEDURE — 97165 OT EVAL LOW COMPLEX 30 MIN: CPT

## 2020-03-09 PROCEDURE — G0378 HOSPITAL OBSERVATION PER HR: HCPCS

## 2020-03-09 PROCEDURE — 96376 TX/PRO/DX INJ SAME DRUG ADON: CPT | Mod: XU

## 2020-03-09 PROCEDURE — 93306 TTE W/DOPPLER COMPLETE: CPT

## 2020-03-09 PROCEDURE — A9270 NON-COVERED ITEM OR SERVICE: HCPCS | Performed by: INTERNAL MEDICINE

## 2020-03-09 PROCEDURE — 36415 COLL VENOUS BLD VENIPUNCTURE: CPT

## 2020-03-09 RX ORDER — BUTALBITAL, ACETAMINOPHEN AND CAFFEINE 50; 325; 40 MG/1; MG/1; MG/1
1 TABLET ORAL EVERY 6 HOURS PRN
Status: COMPLETED | OUTPATIENT
Start: 2020-03-09 | End: 2020-03-11

## 2020-03-09 RX ORDER — MAGNESIUM SULFATE 1 G/100ML
1 INJECTION INTRAVENOUS 2 TIMES DAILY PRN
Status: DISCONTINUED | OUTPATIENT
Start: 2020-03-09 | End: 2020-03-13 | Stop reason: HOSPADM

## 2020-03-09 RX ORDER — ASPIRIN 81 MG/1
324 TABLET, CHEWABLE ORAL DAILY
Status: DISCONTINUED | OUTPATIENT
Start: 2020-03-09 | End: 2020-03-11

## 2020-03-09 RX ORDER — POTASSIUM CHLORIDE 20 MEQ/1
40 TABLET, EXTENDED RELEASE ORAL ONCE
Status: COMPLETED | OUTPATIENT
Start: 2020-03-09 | End: 2020-03-09

## 2020-03-09 RX ORDER — ASPIRIN 300 MG/1
300 SUPPOSITORY RECTAL DAILY
Status: DISCONTINUED | OUTPATIENT
Start: 2020-03-09 | End: 2020-03-11

## 2020-03-09 RX ORDER — METOCLOPRAMIDE HYDROCHLORIDE 5 MG/ML
10 INJECTION INTRAMUSCULAR; INTRAVENOUS EVERY 6 HOURS PRN
Status: COMPLETED | OUTPATIENT
Start: 2020-03-09 | End: 2020-03-10

## 2020-03-09 RX ORDER — DIPHENHYDRAMINE HYDROCHLORIDE 50 MG/ML
25 INJECTION INTRAMUSCULAR; INTRAVENOUS 2 TIMES DAILY PRN
Status: DISCONTINUED | OUTPATIENT
Start: 2020-03-09 | End: 2020-03-09

## 2020-03-09 RX ORDER — HYDROMORPHONE HYDROCHLORIDE 1 MG/ML
0.5 INJECTION, SOLUTION INTRAMUSCULAR; INTRAVENOUS; SUBCUTANEOUS EVERY 6 HOURS PRN
Status: COMPLETED | OUTPATIENT
Start: 2020-03-09 | End: 2020-03-10

## 2020-03-09 RX ORDER — ASPIRIN 325 MG
325 TABLET ORAL DAILY
Status: DISCONTINUED | OUTPATIENT
Start: 2020-03-09 | End: 2020-03-13 | Stop reason: HOSPADM

## 2020-03-09 RX ADMIN — HYDROMORPHONE HYDROCHLORIDE 0.5 MG: 1 INJECTION, SOLUTION INTRAMUSCULAR; INTRAVENOUS; SUBCUTANEOUS at 09:23

## 2020-03-09 RX ADMIN — DIPHENHYDRAMINE HYDROCHLORIDE 50 MG: 50 INJECTION INTRAMUSCULAR; INTRAVENOUS at 00:59

## 2020-03-09 RX ADMIN — HYDROMORPHONE HYDROCHLORIDE 0.5 MG: 1 INJECTION, SOLUTION INTRAMUSCULAR; INTRAVENOUS; SUBCUTANEOUS at 22:33

## 2020-03-09 RX ADMIN — DIVALPROEX SODIUM 500 MG: 500 TABLET, DELAYED RELEASE ORAL at 16:44

## 2020-03-09 RX ADMIN — SENNOSIDES AND DOCUSATE SODIUM 2 TABLET: 8.6; 5 TABLET ORAL at 16:43

## 2020-03-09 RX ADMIN — HYDROMORPHONE HYDROCHLORIDE 0.5 MG: 1 INJECTION, SOLUTION INTRAMUSCULAR; INTRAVENOUS; SUBCUTANEOUS at 03:27

## 2020-03-09 RX ADMIN — DIPHENHYDRAMINE HYDROCHLORIDE 50 MG: 50 INJECTION INTRAMUSCULAR; INTRAVENOUS at 05:18

## 2020-03-09 RX ADMIN — SENNOSIDES AND DOCUSATE SODIUM 2 TABLET: 8.6; 5 TABLET ORAL at 05:21

## 2020-03-09 RX ADMIN — ACETAMINOPHEN 650 MG: 325 TABLET, FILM COATED ORAL at 13:20

## 2020-03-09 RX ADMIN — DIVALPROEX SODIUM 250 MG: 250 TABLET, DELAYED RELEASE ORAL at 13:20

## 2020-03-09 RX ADMIN — DIPHENHYDRAMINE HYDROCHLORIDE 50 MG: 50 INJECTION INTRAMUSCULAR; INTRAVENOUS at 11:03

## 2020-03-09 RX ADMIN — POTASSIUM CHLORIDE 40 MEQ: 1500 TABLET, EXTENDED RELEASE ORAL at 05:20

## 2020-03-09 RX ADMIN — RISPERIDONE 2 MG: 0.5 TABLET ORAL at 16:44

## 2020-03-09 RX ADMIN — KETOROLAC TROMETHAMINE 30 MG: 30 INJECTION, SOLUTION INTRAMUSCULAR at 00:59

## 2020-03-09 RX ADMIN — SODIUM CHLORIDE: 9 INJECTION, SOLUTION INTRAVENOUS at 11:03

## 2020-03-09 RX ADMIN — DIVALPROEX SODIUM 250 MG: 250 TABLET, DELAYED RELEASE ORAL at 05:20

## 2020-03-09 RX ADMIN — METOCLOPRAMIDE 10 MG: 5 INJECTION, SOLUTION INTRAMUSCULAR; INTRAVENOUS at 16:54

## 2020-03-09 RX ADMIN — RISPERIDONE 2 MG: 0.5 TABLET ORAL at 05:20

## 2020-03-09 RX ADMIN — KETOROLAC TROMETHAMINE 30 MG: 30 INJECTION, SOLUTION INTRAMUSCULAR at 21:45

## 2020-03-09 RX ADMIN — ASPIRIN 325 MG: 325 TABLET, FILM COATED ORAL at 05:20

## 2020-03-09 RX ADMIN — HYDROMORPHONE HYDROCHLORIDE 0.5 MG: 1 INJECTION, SOLUTION INTRAMUSCULAR; INTRAVENOUS; SUBCUTANEOUS at 15:28

## 2020-03-09 RX ADMIN — DIPHENHYDRAMINE HYDROCHLORIDE 50 MG: 50 INJECTION INTRAMUSCULAR; INTRAVENOUS at 21:45

## 2020-03-09 RX ADMIN — LEVETIRACETAM 1250 MG: 500 TABLET ORAL at 05:21

## 2020-03-09 RX ADMIN — LEVETIRACETAM 1250 MG: 500 TABLET ORAL at 16:43

## 2020-03-09 RX ADMIN — MAGNESIUM SULFATE IN DEXTROSE 1 G: 10 INJECTION, SOLUTION INTRAVENOUS at 13:20

## 2020-03-09 ASSESSMENT — COGNITIVE AND FUNCTIONAL STATUS - GENERAL
MOBILITY SCORE: 24
HELP NEEDED FOR BATHING: A LITTLE
CLIMB 3 TO 5 STEPS WITH RAILING: A LITTLE
SUGGESTED CMS G CODE MODIFIER MOBILITY: CH
DAILY ACTIVITIY SCORE: 24
DRESSING REGULAR UPPER BODY CLOTHING: A LITTLE
SUGGESTED CMS G CODE MODIFIER MOBILITY: CI
PERSONAL GROOMING: A LITTLE
DAILY ACTIVITIY SCORE: 20
DRESSING REGULAR LOWER BODY CLOTHING: A LITTLE
SUGGESTED CMS G CODE MODIFIER DAILY ACTIVITY: CH
MOBILITY SCORE: 23
SUGGESTED CMS G CODE MODIFIER DAILY ACTIVITY: CJ

## 2020-03-09 ASSESSMENT — ENCOUNTER SYMPTOMS
VOMITING: 0
CHILLS: 0
TINGLING: 1
HEADACHES: 1
SPEECH CHANGE: 0
MYALGIAS: 0
SHORTNESS OF BREATH: 0
EYE PAIN: 0
STRIDOR: 0
ORTHOPNEA: 0
FOCAL WEAKNESS: 1
NECK PAIN: 0
TREMORS: 0
DOUBLE VISION: 0
PHOTOPHOBIA: 0
NAUSEA: 0
SPUTUM PRODUCTION: 0
WEIGHT LOSS: 0
CONSTIPATION: 0
HALLUCINATIONS: 0
DIZZINESS: 0
SINUS PAIN: 0
ABDOMINAL PAIN: 0
SENSORY CHANGE: 1
BLURRED VISION: 0
BACK PAIN: 0
FEVER: 0
PALPITATIONS: 0
DIARRHEA: 0
COUGH: 0
SORE THROAT: 0

## 2020-03-09 ASSESSMENT — PATIENT HEALTH QUESTIONNAIRE - PHQ9
9. THOUGHTS THAT YOU WOULD BE BETTER OFF DEAD, OR OF HURTING YOURSELF: NOT AT ALL
5. POOR APPETITE OR OVEREATING: NOT AT ALL
3. TROUBLE FALLING OR STAYING ASLEEP OR SLEEPING TOO MUCH: NOT AT ALL
SUM OF ALL RESPONSES TO PHQ9 QUESTIONS 1 AND 2: 0
2. FEELING DOWN, DEPRESSED, IRRITABLE, OR HOPELESS: NOT AT ALL
1. LITTLE INTEREST OR PLEASURE IN DOING THINGS: NOT AT ALL
SUM OF ALL RESPONSES TO PHQ9 QUESTIONS 1 AND 2: 0
6. FEELING BAD ABOUT YOURSELF - OR THAT YOU ARE A FAILURE OR HAVE LET YOURSELF OR YOUR FAMILY DOWN: NOT AL ALL
7. TROUBLE CONCENTRATING ON THINGS, SUCH AS READING THE NEWSPAPER OR WATCHING TELEVISION: NOT AT ALL
2. FEELING DOWN, DEPRESSED, IRRITABLE, OR HOPELESS: NOT AT ALL
1. LITTLE INTEREST OR PLEASURE IN DOING THINGS: NOT AT ALL
8. MOVING OR SPEAKING SO SLOWLY THAT OTHER PEOPLE COULD HAVE NOTICED. OR THE OPPOSITE, BEING SO FIGETY OR RESTLESS THAT YOU HAVE BEEN MOVING AROUND A LOT MORE THAN USUAL: NOT AT ALL
4. FEELING TIRED OR HAVING LITTLE ENERGY: NOT AT ALL
SUM OF ALL RESPONSES TO PHQ QUESTIONS 1-9: 0

## 2020-03-09 ASSESSMENT — GAIT ASSESSMENTS
DISTANCE (FEET): 200
GAIT LEVEL OF ASSIST: SUPERVISED

## 2020-03-09 ASSESSMENT — LIFESTYLE VARIABLES
SUBSTANCE_ABUSE: 0
EVER_SMOKED: NEVER

## 2020-03-09 ASSESSMENT — FIBROSIS 4 INDEX: FIB4 SCORE: 1.48

## 2020-03-09 ASSESSMENT — ACTIVITIES OF DAILY LIVING (ADL): TOILETING: INDEPENDENT

## 2020-03-09 NOTE — PROGRESS NOTES
2RN skin check: Old port scar on right upper chest, circular w/ redness. Left upper chest has newer, smaller, lateral scar. Sacrum intact and blanching.

## 2020-03-09 NOTE — ED NOTES
Report given to floor by day shift RN, Quin. Transport RN here for patient. Report given. POC discussed.

## 2020-03-09 NOTE — ASSESSMENT & PLAN NOTE
Hx complicated migraine reported she has prior history of sensory changes associated with her headache.  On admission, started on non-TPA stroke protocol.  She underwent CTA head and neck and did not show any acute abnormalities  CT head without contrast did not show any acute or maladies per  Echo normal  Has spine stimulator which is not compatible with MRI

## 2020-03-09 NOTE — THERAPY
"Occupational Therapy Evaluation completed.   Functional Status: Pt is a 48yo female admitted for LUE/LE weakness and sensations changes. PMHx of prior CVA with residual R sided weakness,  shunt, migraine, and seizures. Completed ADLs/txfs with SPV and functional ambulation w/o AD. Demo'd decreased strength and sensation changes in BUE, during formal testing, but demo'd no functional deficits. Reports  can provide 24/7 assist/SPV and assists with all IADLS and some ADLs at baseline. Patient will not be actively followed for occupational therapy services at this time, however may be seen if requested by physician for 1 more visit within 30 days to address any discharge or equipment needs.     Plan of Care: Will benefit from Occupational Therapy for d/c needs only   Discharge Recommendations:  Equipment: No Equipment Needed. Post-acute therapy: Patient will not be actively followed for occupational therapy services at this time, however may be seen if requested by physician for 1 more visit within 30 days to address any discharge or equipment needs.     See \"Rehab Therapy-Acute\" Patient Summary Report for complete documentation.    "

## 2020-03-09 NOTE — PROGRESS NOTES
"[facility] Medicine Daily Progress Note    Date of Service  3/9/2020    Chief Complaint  49 y.o. female admitted 3/8/2020 with headache and left-sided weakness.       Hospital Course      49F, PMHx Migraine, seizures, brain shunt, anxiety. Admit w headache & left side weakness.  MRI pending.          Interval Problem Update  Still having severe headache, reports left-sided weaknesses slightly improving.  Pressures 90s-100s over 50s-60s.   Multimodal pain control and antiemetics.  Patient's shunt appears to be functioning on imaging \"Right frontal ventriculoperitoneal shunt catheter present. The ventricular volume is unchanged\".    PT/OT ordered, pending.   Discussed with patient, patient's nurse and with multidisciplinary team during rounds including , pharmacist and charge nurse.     Consultants/Specialty  None consider Neurology if MRI shows a stroke.       Code Status  Full code    Disposition  To be determined, MRI results, PT/OT.     Review of Systems  Review of Systems   Constitutional: Positive for malaise/fatigue. Negative for chills, fever and weight loss.   HENT: Negative for hearing loss, sinus pain, sore throat and tinnitus.    Eyes: Negative for blurred vision, double vision, photophobia and pain.   Respiratory: Negative for cough, sputum production, shortness of breath and stridor.    Cardiovascular: Negative for chest pain, palpitations, orthopnea and leg swelling.   Gastrointestinal: Negative for abdominal pain, constipation, diarrhea, nausea and vomiting.   Genitourinary: Negative for dysuria, frequency and urgency.   Musculoskeletal: Negative for back pain, joint pain, myalgias and neck pain.   Skin: Negative for rash.   Neurological: Positive for tingling, sensory change, focal weakness and headaches. Negative for dizziness, tremors and speech change.   Psychiatric/Behavioral: Negative for hallucinations and substance abuse.       Physical Exam  Temp:  [35.9 °C (96.6 °F)-36.7 °C (98.1 " °F)] 36.7 °C (98.1 °F)  Pulse:  [] 85  Resp:  [16] 16  BP: ()/(51-86) 100/51  SpO2:  [92 %-100 %] 100 %    Physical Exam  Vitals signs reviewed.   Constitutional:       Appearance: She is obese. She is not ill-appearing.   HENT:      Head: Normocephalic and atraumatic.      Right Ear: External ear normal.      Left Ear: External ear normal.      Nose: No congestion.      Mouth/Throat:      Mouth: Mucous membranes are dry.   Eyes:      General:         Right eye: No discharge.         Left eye: No discharge.      Pupils: Pupils are equal, round, and reactive to light.   Neck:      Musculoskeletal: Normal range of motion. No neck rigidity.   Cardiovascular:      Rate and Rhythm: Normal rate and regular rhythm.      Pulses: Normal pulses.      Heart sounds: Normal heart sounds. No murmur.   Pulmonary:      Effort: Pulmonary effort is normal. No respiratory distress.      Breath sounds: Normal breath sounds. No stridor.   Abdominal:      General: Bowel sounds are normal. There is no distension.      Palpations: Abdomen is soft.      Tenderness: There is no abdominal tenderness.   Musculoskeletal: Normal range of motion.         General: No swelling or tenderness.   Skin:     General: Skin is warm and dry.      Capillary Refill: Capillary refill takes 2 to 3 seconds.      Coloration: Skin is pale. Skin is not jaundiced.      Findings: No bruising.   Neurological:      General: No focal deficit present.      Mental Status: She is alert and oriented to person, place, and time.      Cranial Nerves: No cranial nerve deficit.      Sensory: Sensory deficit (Left arm and left foot ) present.      Motor: Weakness (Left upper and lower extremity 4/5 ) present.      Comments: No focal motor neurological deficit.     Psychiatric:      Comments: Depressed mood, slowed.       Fluids    Intake/Output Summary (Last 24 hours) at 3/9/2020 1252  Last data filed at 3/9/2020 0900  Gross per 24 hour   Intake 360 ml   Output --    Net 360 ml       Laboratory  Recent Labs     03/08/20  1456 03/09/20  0501   WBC 6.5 4.0*   RBC 4.54 3.97*   HEMOGLOBIN 13.6 11.7*   HEMATOCRIT 41.2 35.7*   MCV 90.7 89.9   MCH 30.0 29.5   MCHC 33.0* 32.8*   RDW 50.8* 50.0   PLATELETCT 335 245   MPV 10.2 10.4     Recent Labs     03/08/20  1456 03/09/20  0501   SODIUM 139 139   POTASSIUM 3.5* 4.1   CHLORIDE 102 106   CO2 24 24   GLUCOSE 55* 102*   BUN 10 7*   CREATININE 0.78 0.67   CALCIUM 9.7 8.7     Recent Labs     03/08/20  1456   APTT 24.7   INR 0.99               Imaging  EC-ECHOCARDIOGRAM COMPLETE W/O CONT         CT-CTA NECK WITH & W/O-POST PROCESSING   Final Result      1.  Patent carotid and vertebral arteries. No evidence of occlusion or dissection.      2.  Intraspinal pain treatment lines present.      DX-CHEST-PORTABLE (1 VIEW)   Final Result         No acute cardiac or pulmonary abnormality is identified.      CT-CTA HEAD WITH & W/O-POST PROCESS   Final Result      CT angiogram of the [place] within normal limits.      CT-HEAD W/O   Final Result      1.  No evidence of acute intracranial process.      2.  Right frontal ventriculoperitoneal shunt catheter present. The ventricular volume is unchanged.      MR-BRAIN-W/O    (Results Pending)        Assessment/Plan  Left-sided weakness- (present on admission)  Assessment & Plan  Hx complicated migraine reported she has prior history of sensory changes associated with her headache.  On admission, started on non-TPA stroke protocol.  She underwent CTA head and neck and did not show any acute abnormalities  CT head without contrast did not show any acute or maladies per  Pending MRI brain without contrast, Echo      Complicated migraine- (present on admission)  Assessment & Plan  Hx complicated migraine reported she has prior history of sensory changes associated with her headache.  Unilateral pounding headache, associated with nausea Vomiting and weakness   Reportedly allergic to prochlorperazine  "(compazine)    Metoclopramide (Reglan)  Tylenol PRN   IV Mg PRN   Benadryl      Dehydration- (present on admission)  Assessment & Plan  Secondary to nausea and vomiting, continue intravenous fluids.    S/P  shunt- (present on admission)  Assessment & Plan  Appears to be functioning on imaging \"Right frontal ventriculoperitoneal shunt catheter present. The ventricular volume is unchanged\"    Seizures (HCC)- (present on admission)  Assessment & Plan  Resume home Keppra and valproic acid.  Lorazepam as needed for seizures.     Depression- (present on admission)  Assessment & Plan  Continue outpatient medications.     Anxiety- (present on admission)  Assessment & Plan  Supportive care, can consider lorazepam if there is exacerbation        VTE prophylaxis: SCDs       "

## 2020-03-09 NOTE — DISCHARGE PLANNING
[facility] Transitional Care Coordination     Referral from:  Dr. Johnson    Facesheet indicates: [employer]-no benefits with [facility].    Potential Rehab Diagnosis: Presented with c/o H/A    Chart review indicates patient may have on going medical management and may have therapy needs to possibly meet inpatient rehab facility criteria with the goal of returning to community.    D/C support: Spouse     Physiatry consultation pended per protocol.       Presented with c/o H/A.  W/U & TX evals re pending.  [employer]-no benefits with [facility].  Waiting on additional information to determine appropriateness for acute inpatient rehabilitation. Will continue to follow.       Thank you for the referral.

## 2020-03-09 NOTE — ASSESSMENT & PLAN NOTE
"Appears to be functioning on imaging \"Right frontal ventriculoperitoneal shunt catheter present. The ventricular volume is unchanged\"  "

## 2020-03-09 NOTE — CARE PLAN
Problem: Safety  Goal: Will remain free from injury  Outcome: PROGRESSING AS EXPECTED  Note: Reviewed patient's mobility status due to stroke deficits, discussed with care team of patient needs, verifying appropriate safety precautions in place, providing patient stroke education, ensuring call lights are within reach, non-slip socks in use, evaluating needs alarms & monitoring every shift, continuing with current stroke plan of care.       Problem: Knowledge Deficit  Goal: Knowledge of disease process/condition, treatment plan, diagnostic tests, and medications will improve  Outcome: PROGRESSING AS EXPECTED  Note: Educated patient about current stroke POC, activities, encouraging patient to ask questions regarding stroke care plan, providing answers to patient's questions, educating patient about medications, encouraging patient involvement in care process. Continuing with current POC.

## 2020-03-09 NOTE — ASSESSMENT & PLAN NOTE
Hx complicated migraine reported she has prior history of sensory changes associated with her headache.  Unilateral pounding headache, associated with nausea Vomiting and weakness   Reportedly allergic to prochlorperazine (compazine)    Metoclopramide (Reglan)  Tylenol PRN   IV Mg PRN   Benadryl    Oxycodone  Avoid giving IV narcotics or increasing the dose

## 2020-03-10 LAB
ANION GAP SERPL CALC-SCNC: 8 MMOL/L (ref 0–11.9)
BUN SERPL-MCNC: 8 MG/DL (ref 8–22)
CALCIUM SERPL-MCNC: 9.2 MG/DL (ref 8.5–10.5)
CHLORIDE SERPL-SCNC: 104 MMOL/L (ref 96–112)
CHOLEST SERPL-MCNC: 148 MG/DL (ref 100–199)
CO2 SERPL-SCNC: 27 MMOL/L (ref 20–33)
CREAT SERPL-MCNC: 0.54 MG/DL (ref 0.5–1.4)
EST. AVERAGE GLUCOSE BLD GHB EST-MCNC: 100 MG/DL
GLUCOSE SERPL-MCNC: 95 MG/DL (ref 65–99)
HBA1C MFR BLD: 5.1 % (ref 0–5.6)
HDLC SERPL-MCNC: 36 MG/DL
LDLC SERPL CALC-MCNC: 94 MG/DL
POTASSIUM SERPL-SCNC: 4.2 MMOL/L (ref 3.6–5.5)
SODIUM SERPL-SCNC: 139 MMOL/L (ref 135–145)
TRIGL SERPL-MCNC: 88 MG/DL (ref 0–149)

## 2020-03-10 PROCEDURE — A9270 NON-COVERED ITEM OR SERVICE: HCPCS | Performed by: INTERNAL MEDICINE

## 2020-03-10 PROCEDURE — 700102 HCHG RX REV CODE 250 W/ 637 OVERRIDE(OP): Performed by: INTERNAL MEDICINE

## 2020-03-10 PROCEDURE — 700111 HCHG RX REV CODE 636 W/ 250 OVERRIDE (IP): Performed by: INTERNAL MEDICINE

## 2020-03-10 PROCEDURE — G0378 HOSPITAL OBSERVATION PER HR: HCPCS

## 2020-03-10 PROCEDURE — 700102 HCHG RX REV CODE 250 W/ 637 OVERRIDE(OP): Performed by: HOSPITALIST

## 2020-03-10 PROCEDURE — 96376 TX/PRO/DX INJ SAME DRUG ADON: CPT

## 2020-03-10 PROCEDURE — 80061 LIPID PANEL: CPT

## 2020-03-10 PROCEDURE — 700105 HCHG RX REV CODE 258: Performed by: INTERNAL MEDICINE

## 2020-03-10 PROCEDURE — 83036 HEMOGLOBIN GLYCOSYLATED A1C: CPT

## 2020-03-10 PROCEDURE — A9270 NON-COVERED ITEM OR SERVICE: HCPCS | Performed by: HOSPITALIST

## 2020-03-10 PROCEDURE — 80048 BASIC METABOLIC PNL TOTAL CA: CPT

## 2020-03-10 PROCEDURE — 96372 THER/PROPH/DIAG INJ SC/IM: CPT

## 2020-03-10 PROCEDURE — 700111 HCHG RX REV CODE 636 W/ 250 OVERRIDE (IP): Performed by: HOSPITALIST

## 2020-03-10 PROCEDURE — 36415 COLL VENOUS BLD VENIPUNCTURE: CPT

## 2020-03-10 PROCEDURE — 99225 PR SUBSEQUENT OBSERVATION CARE,LEVEL II: CPT | Performed by: INTERNAL MEDICINE

## 2020-03-10 RX ORDER — DIPHENHYDRAMINE HYDROCHLORIDE 50 MG/ML
50 INJECTION INTRAMUSCULAR; INTRAVENOUS EVERY 6 HOURS PRN
Status: DISCONTINUED | OUTPATIENT
Start: 2020-03-10 | End: 2020-03-13 | Stop reason: HOSPADM

## 2020-03-10 RX ORDER — OXYCODONE HYDROCHLORIDE 5 MG/1
5 TABLET ORAL EVERY 4 HOURS PRN
Status: DISCONTINUED | OUTPATIENT
Start: 2020-03-10 | End: 2020-03-11

## 2020-03-10 RX ADMIN — BUTALBITAL, ACETAMINOPHEN, AND CAFFEINE 1 TABLET: 50; 325; 40 TABLET ORAL at 21:01

## 2020-03-10 RX ADMIN — SENNOSIDES AND DOCUSATE SODIUM 2 TABLET: 8.6; 5 TABLET ORAL at 04:54

## 2020-03-10 RX ADMIN — DIVALPROEX SODIUM 250 MG: 250 TABLET, DELAYED RELEASE ORAL at 12:21

## 2020-03-10 RX ADMIN — DIPHENHYDRAMINE HYDROCHLORIDE 50 MG: 50 INJECTION INTRAMUSCULAR; INTRAVENOUS at 01:46

## 2020-03-10 RX ADMIN — DIVALPROEX SODIUM 250 MG: 250 TABLET, DELAYED RELEASE ORAL at 04:57

## 2020-03-10 RX ADMIN — OXYCODONE HYDROCHLORIDE 5 MG: 5 TABLET ORAL at 13:44

## 2020-03-10 RX ADMIN — LEVETIRACETAM 1250 MG: 500 TABLET ORAL at 17:37

## 2020-03-10 RX ADMIN — DIPHENHYDRAMINE HYDROCHLORIDE 50 MG: 50 INJECTION INTRAMUSCULAR; INTRAVENOUS at 15:56

## 2020-03-10 RX ADMIN — DIPHENHYDRAMINE HYDROCHLORIDE 50 MG: 50 INJECTION INTRAMUSCULAR; INTRAVENOUS at 09:45

## 2020-03-10 RX ADMIN — HYDROMORPHONE HYDROCHLORIDE 0.5 MG: 1 INJECTION, SOLUTION INTRAMUSCULAR; INTRAVENOUS; SUBCUTANEOUS at 04:50

## 2020-03-10 RX ADMIN — ACETAMINOPHEN 650 MG: 325 TABLET, FILM COATED ORAL at 14:26

## 2020-03-10 RX ADMIN — ACETAMINOPHEN 650 MG: 325 TABLET, FILM COATED ORAL at 08:01

## 2020-03-10 RX ADMIN — METOCLOPRAMIDE 10 MG: 5 INJECTION, SOLUTION INTRAMUSCULAR; INTRAVENOUS at 04:07

## 2020-03-10 RX ADMIN — SODIUM CHLORIDE: 9 INJECTION, SOLUTION INTRAVENOUS at 12:26

## 2020-03-10 RX ADMIN — LEVETIRACETAM 1250 MG: 500 TABLET ORAL at 04:54

## 2020-03-10 RX ADMIN — RISPERIDONE 2 MG: 0.5 TABLET ORAL at 04:54

## 2020-03-10 RX ADMIN — ACETAMINOPHEN 650 MG: 325 TABLET, FILM COATED ORAL at 21:02

## 2020-03-10 RX ADMIN — DIPHENHYDRAMINE HYDROCHLORIDE 50 MG: 50 INJECTION INTRAMUSCULAR; INTRAVENOUS at 04:07

## 2020-03-10 RX ADMIN — METOCLOPRAMIDE 10 MG: 5 INJECTION, SOLUTION INTRAMUSCULAR; INTRAVENOUS at 13:22

## 2020-03-10 RX ADMIN — DIPHENHYDRAMINE HYDROCHLORIDE 50 MG: 50 INJECTION INTRAMUSCULAR; INTRAVENOUS at 21:57

## 2020-03-10 RX ADMIN — OXYCODONE HYDROCHLORIDE 5 MG: 5 TABLET ORAL at 23:16

## 2020-03-10 RX ADMIN — OXYCODONE HYDROCHLORIDE 5 MG: 5 TABLET ORAL at 09:45

## 2020-03-10 RX ADMIN — ASPIRIN 325 MG: 325 TABLET, FILM COATED ORAL at 04:54

## 2020-03-10 RX ADMIN — RISPERIDONE 2 MG: 0.5 TABLET ORAL at 17:39

## 2020-03-10 RX ADMIN — BUTALBITAL, ACETAMINOPHEN, AND CAFFEINE 1 TABLET: 50; 325; 40 TABLET ORAL at 14:23

## 2020-03-10 RX ADMIN — DIVALPROEX SODIUM 500 MG: 500 TABLET, DELAYED RELEASE ORAL at 17:38

## 2020-03-10 RX ADMIN — KETOROLAC TROMETHAMINE 30 MG: 30 INJECTION, SOLUTION INTRAMUSCULAR at 12:21

## 2020-03-10 RX ADMIN — BUTALBITAL, ACETAMINOPHEN, AND CAFFEINE 1 TABLET: 50; 325; 40 TABLET ORAL at 08:01

## 2020-03-10 RX ADMIN — OXYCODONE HYDROCHLORIDE 5 MG: 5 TABLET ORAL at 17:39

## 2020-03-10 RX ADMIN — DIPHENHYDRAMINE HYDROCHLORIDE 50 MG: 50 INJECTION INTRAMUSCULAR; INTRAVENOUS at 00:19

## 2020-03-10 RX ADMIN — KETOROLAC TROMETHAMINE 30 MG: 30 INJECTION, SOLUTION INTRAMUSCULAR at 23:54

## 2020-03-10 RX ADMIN — SODIUM CHLORIDE: 9 INJECTION, SOLUTION INTRAVENOUS at 00:19

## 2020-03-10 ASSESSMENT — ENCOUNTER SYMPTOMS
DIARRHEA: 0
NAUSEA: 0
ABDOMINAL PAIN: 0
SENSORY CHANGE: 1
SINUS PAIN: 0
EYE PAIN: 0
BLURRED VISION: 0
SHORTNESS OF BREATH: 0
BACK PAIN: 0
STRIDOR: 0
ORTHOPNEA: 0
FEVER: 0
DIZZINESS: 0
TREMORS: 0
DOUBLE VISION: 0
WEIGHT LOSS: 0
FOCAL WEAKNESS: 1
SPUTUM PRODUCTION: 0
VOMITING: 0
HEADACHES: 1
COUGH: 0
NECK PAIN: 0
PALPITATIONS: 0
CONSTIPATION: 0
MYALGIAS: 0
SPEECH CHANGE: 0
CHILLS: 0
HALLUCINATIONS: 0
PHOTOPHOBIA: 0

## 2020-03-10 ASSESSMENT — LIFESTYLE VARIABLES: SUBSTANCE_ABUSE: 0

## 2020-03-10 NOTE — CARE PLAN
Problem: Safety  Goal: Will remain free from injury  Outcome: PROGRESSING AS EXPECTED  Note: Reviewed patient's mobility status due to stroke deficits, discussed with care team of patient needs, verifying appropriate safety precautions in place, providing patient stroke education, ensuring call lights are within reach, non-slip socks in use, evaluating needs alarms & monitoring every shift, continuing with current stroke plan of care.       Problem: Knowledge Deficit:  Goal: Ability to state lifestyle or environmental changes necessary to maintain safety will improve  Outcome: PROGRESSING AS EXPECTED  Note: Educated patient about current stroke POC, activities, encouraging patient to ask questions regarding stroke care plan, providing answers to patient's questions, educating patient about medications, encouraging patient involvement in care process. Continuing with current POC.

## 2020-03-10 NOTE — CARE PLAN
Problem: Safety  Goal: Will remain free from injury  Outcome: PROGRESSING AS EXPECTED  Note: Bed in lowest position, treaded socks on     Problem: Safety:  Goal: Will remain free from injury  Outcome: PROGRESSING AS EXPECTED  Note: Bed in lowest position, treaded socks on

## 2020-03-10 NOTE — DISCHARGE PLANNING
En[facility] is not requiring 2 of 3 disciplines. Anticipate home once medically cleared.  TCC will no longer follow.  Please re-consult for further review.

## 2020-03-10 NOTE — PROGRESS NOTES
Significant events overnight: patient did not sleep much, continues to c/o migraines. Medicated as needed.    Neuro status: AOx4    Cardiac: Tele monitored    Pain: 6/10 at most comfortable, 9/10 at worst pain. Constant migraine, medicated per MAR    Bladder/Bowel: Voids, no BM overnight    Diet: Regular    Mobility: Standby

## 2020-03-10 NOTE — PROGRESS NOTES
"[facility] Medicine Daily Progress Note    Date of Service  3/10/2020    Chief Complaint  49 y.o. female admitted 3/8/2020 with headache and left-sided weakness.       Hospital Course      49F, PMHx Migraine, seizures, brain shunt, anxiety. Admit w headache & left side weakness.  MRI pending.          Interval Problem Update  Pt seen and examined, afebrile, no overnight events, requiring more narcotics.  Still with some HA.   Avoid increasing narcotics   Patient's shunt appears to be functioning on imaging \"Right frontal ventriculoperitoneal shunt catheter present. The ventricular volume is unchanged\".    PT/OT eval     Consultants/Specialty  None    Code Status  Full code    Disposition  To be determined, PT/OT.     Review of Systems  Review of Systems   Constitutional: Positive for malaise/fatigue. Negative for chills, fever and weight loss.   HENT: Negative for sinus pain and tinnitus.    Eyes: Negative for blurred vision, double vision, photophobia and pain.   Respiratory: Negative for cough, sputum production, shortness of breath and stridor.    Cardiovascular: Negative for chest pain, palpitations and orthopnea.   Gastrointestinal: Negative for abdominal pain, constipation, diarrhea, nausea and vomiting.   Genitourinary: Negative for dysuria, frequency and urgency.   Musculoskeletal: Negative for back pain, myalgias and neck pain.   Skin: Negative for rash.   Neurological: Positive for sensory change, focal weakness and headaches. Negative for dizziness, tremors and speech change.   Psychiatric/Behavioral: Negative for hallucinations and substance abuse.   All other systems reviewed and are negative.      Physical Exam  Temp:  [36.4 °C (97.6 °F)-37.1 °C (98.8 °F)] 36.7 °C (98.1 °F)  Pulse:  [79-96] 79  Resp:  [16] 16  BP: (103-123)/(58-80) 104/67  SpO2:  [94 %-99 %] 99 %    Physical Exam  Vitals signs reviewed.   Constitutional:       Appearance: She is obese. She is not ill-appearing.   HENT:      Head: " Normocephalic and atraumatic.      Right Ear: External ear normal.      Left Ear: External ear normal.      Nose: No congestion.      Mouth/Throat:      Mouth: Mucous membranes are dry.   Eyes:      General:         Right eye: No discharge.         Left eye: No discharge.      Pupils: Pupils are equal, round, and reactive to light.   Neck:      Musculoskeletal: Normal range of motion. No neck rigidity.   Cardiovascular:      Rate and Rhythm: Normal rate and regular rhythm.      Pulses: Normal pulses.      Heart sounds: Normal heart sounds. No murmur.   Pulmonary:      Effort: Pulmonary effort is normal. No respiratory distress.      Breath sounds: Normal breath sounds. No stridor.   Abdominal:      General: Bowel sounds are normal. There is no distension.      Palpations: Abdomen is soft.      Tenderness: There is no abdominal tenderness. There is no guarding or rebound.   Musculoskeletal: Normal range of motion.         General: No swelling or tenderness.   Skin:     General: Skin is warm and dry.      Capillary Refill: Capillary refill takes 2 to 3 seconds.      Coloration: Skin is pale. Skin is not jaundiced.      Findings: No bruising.   Neurological:      General: No focal deficit present.      Mental Status: She is alert and oriented to person, place, and time.      Cranial Nerves: No cranial nerve deficit.      Motor: Weakness (Left upper and lower extremity 4/5 ) present.      Comments: No focal motor neurological deficit.     Psychiatric:      Comments: Depressed mood, slowed.       Fluids    Intake/Output Summary (Last 24 hours) at 3/10/2020 1326  Last data filed at 3/10/2020 0930  Gross per 24 hour   Intake 240 ml   Output --   Net 240 ml       Laboratory  Recent Labs     03/08/20  1456 03/09/20  0501   WBC 6.5 4.0*   RBC 4.54 3.97*   HEMOGLOBIN 13.6 11.7*   HEMATOCRIT 41.2 35.7*   MCV 90.7 89.9   MCH 30.0 29.5   MCHC 33.0* 32.8*   RDW 50.8* 50.0   PLATELETCT 335 245   MPV 10.2 10.4     Recent Labs      03/08/20  1456 03/09/20  0501 03/10/20  0857   SODIUM 139 139 139   POTASSIUM 3.5* 4.1 4.2   CHLORIDE 102 106 104   CO2 24 24 27   GLUCOSE 55* 102* 95   BUN 10 7* 8   CREATININE 0.78 0.67 0.54   CALCIUM 9.7 8.7 9.2     Recent Labs     03/08/20  1456   APTT 24.7   INR 0.99         Recent Labs     03/10/20  0857   TRIGLYCERIDE 88   HDL 36*   LDL 94       Imaging  EC-ECHOCARDIOGRAM COMPLETE W/O CONT   Final Result      CT-CTA NECK WITH & W/O-POST PROCESSING   Final Result      1.  Patent carotid and vertebral arteries. No evidence of occlusion or dissection.      2.  Intraspinal pain treatment lines present.      DX-CHEST-PORTABLE (1 VIEW)   Final Result         No acute cardiac or pulmonary abnormality is identified.      CT-CTA HEAD WITH & W/O-POST PROCESS   Final Result      CT angiogram of the [place] within normal limits.      CT-HEAD W/O   Final Result      1.  No evidence of acute intracranial process.      2.  Right frontal ventriculoperitoneal shunt catheter present. The ventricular volume is unchanged.      MR-BRAIN-W/O    (Results Pending)        Assessment/Plan  Left-sided weakness- (present on admission)  Assessment & Plan  Hx complicated migraine reported she has prior history of sensory changes associated with her headache.  On admission, started on non-TPA stroke protocol.  She underwent CTA head and neck and did not show any acute abnormalities  CT head without contrast did not show any acute or maladies per  Echo normal  Has spine stimulator which is not compatible with MRI     Complicated migraine- (present on admission)  Assessment & Plan  Hx complicated migraine reported she has prior history of sensory changes associated with her headache.  Unilateral pounding headache, associated with nausea Vomiting and weakness   Reportedly allergic to prochlorperazine (compazine)    Metoclopramide (Reglan)  Tylenol PRN   IV Mg PRN   Benadryl    Oxycodone  Avoid giving IV narcotics or increasing the  "dose     Dehydration- (present on admission)  Assessment & Plan  Secondary to nausea and vomiting, continue intravenous fluids.    S/P  shunt- (present on admission)  Assessment & Plan  Appears to be functioning on imaging \"Right frontal ventriculoperitoneal shunt catheter present. The ventricular volume is unchanged\"    Seizures (HCC)- (present on admission)  Assessment & Plan  Resume home Keppra and valproic acid.  Lorazepam as needed for seizures.     Depression- (present on admission)  Assessment & Plan  Continue outpatient medications.     Anxiety- (present on admission)  Assessment & Plan  Supportive care, can consider lorazepam if there is exacerbation        VTE prophylaxis: SCDs       "

## 2020-03-10 NOTE — PROGRESS NOTES
MD discussed POC with pt, and medications. Pt verbalizes understanding    Spoke to MRI- Spine Stim not compatible per MRI.

## 2020-03-11 LAB
ALBUMIN SERPL BCP-MCNC: 3.8 G/DL (ref 3.2–4.9)
ALP SERPL-CCNC: 112 U/L (ref 30–99)
ALT SERPL-CCNC: 60 U/L (ref 2–50)
ANION GAP SERPL CALC-SCNC: 7 MMOL/L (ref 0–11.9)
AST SERPL-CCNC: 24 U/L (ref 12–45)
BILIRUB CONJ SERPL-MCNC: 0.1 MG/DL (ref 0.1–0.5)
BILIRUB INDIRECT SERPL-MCNC: 0.3 MG/DL (ref 0–1)
BILIRUB SERPL-MCNC: 0.4 MG/DL (ref 0.1–1.5)
BUN SERPL-MCNC: 8 MG/DL (ref 8–22)
CALCIUM SERPL-MCNC: 9.2 MG/DL (ref 8.5–10.5)
CHLORIDE SERPL-SCNC: 101 MMOL/L (ref 96–112)
CO2 SERPL-SCNC: 24 MMOL/L (ref 20–33)
CREAT SERPL-MCNC: 0.56 MG/DL (ref 0.5–1.4)
ERYTHROCYTE [DISTWIDTH] IN BLOOD BY AUTOMATED COUNT: 46.7 FL (ref 35.9–50)
GLUCOSE SERPL-MCNC: 92 MG/DL (ref 65–99)
HCT VFR BLD AUTO: 38.2 % (ref 37–47)
HGB BLD-MCNC: 12.7 G/DL (ref 12–16)
MCH RBC QN AUTO: 29.1 PG (ref 27–33)
MCHC RBC AUTO-ENTMCNC: 33.2 G/DL (ref 33.6–35)
MCV RBC AUTO: 87.6 FL (ref 81.4–97.8)
PLATELET # BLD AUTO: 296 K/UL (ref 164–446)
PMV BLD AUTO: 9.8 FL (ref 9–12.9)
POTASSIUM SERPL-SCNC: 3.9 MMOL/L (ref 3.6–5.5)
PROT SERPL-MCNC: 6.8 G/DL (ref 6–8.2)
RBC # BLD AUTO: 4.36 M/UL (ref 4.2–5.4)
SODIUM SERPL-SCNC: 132 MMOL/L (ref 135–145)
VALPROATE SERPL-MCNC: 35.1 UG/ML (ref 50–100)
WBC # BLD AUTO: 5.7 K/UL (ref 4.8–10.8)

## 2020-03-11 PROCEDURE — 96366 THER/PROPH/DIAG IV INF ADDON: CPT

## 2020-03-11 PROCEDURE — 700102 HCHG RX REV CODE 250 W/ 637 OVERRIDE(OP): Performed by: INTERNAL MEDICINE

## 2020-03-11 PROCEDURE — 99226 PR SUBSEQUENT OBSERVATION CARE,LEVEL III: CPT | Performed by: INTERNAL MEDICINE

## 2020-03-11 PROCEDURE — A9270 NON-COVERED ITEM OR SERVICE: HCPCS | Performed by: INTERNAL MEDICINE

## 2020-03-11 PROCEDURE — G0378 HOSPITAL OBSERVATION PER HR: HCPCS

## 2020-03-11 PROCEDURE — 80076 HEPATIC FUNCTION PANEL: CPT

## 2020-03-11 PROCEDURE — 80048 BASIC METABOLIC PNL TOTAL CA: CPT

## 2020-03-11 PROCEDURE — 700111 HCHG RX REV CODE 636 W/ 250 OVERRIDE (IP): Performed by: INTERNAL MEDICINE

## 2020-03-11 PROCEDURE — 96376 TX/PRO/DX INJ SAME DRUG ADON: CPT

## 2020-03-11 PROCEDURE — 96375 TX/PRO/DX INJ NEW DRUG ADDON: CPT

## 2020-03-11 PROCEDURE — 700102 HCHG RX REV CODE 250 W/ 637 OVERRIDE(OP): Performed by: HOSPITALIST

## 2020-03-11 PROCEDURE — 36415 COLL VENOUS BLD VENIPUNCTURE: CPT

## 2020-03-11 PROCEDURE — 700111 HCHG RX REV CODE 636 W/ 250 OVERRIDE (IP): Performed by: HOSPITALIST

## 2020-03-11 PROCEDURE — 80164 ASSAY DIPROPYLACETIC ACD TOT: CPT

## 2020-03-11 PROCEDURE — 85027 COMPLETE CBC AUTOMATED: CPT

## 2020-03-11 PROCEDURE — A9270 NON-COVERED ITEM OR SERVICE: HCPCS | Performed by: HOSPITALIST

## 2020-03-11 PROCEDURE — 96372 THER/PROPH/DIAG INJ SC/IM: CPT

## 2020-03-11 RX ORDER — LORAZEPAM 2 MG/ML
1 INJECTION INTRAMUSCULAR EVERY 6 HOURS PRN
Status: DISCONTINUED | OUTPATIENT
Start: 2020-03-11 | End: 2020-03-13 | Stop reason: HOSPADM

## 2020-03-11 RX ORDER — METOCLOPRAMIDE HYDROCHLORIDE 5 MG/ML
10 INJECTION INTRAMUSCULAR; INTRAVENOUS EVERY 6 HOURS PRN
Status: DISCONTINUED | OUTPATIENT
Start: 2020-03-11 | End: 2020-03-13 | Stop reason: HOSPADM

## 2020-03-11 RX ADMIN — DIPHENHYDRAMINE HYDROCHLORIDE 50 MG: 50 INJECTION INTRAMUSCULAR; INTRAVENOUS at 16:18

## 2020-03-11 RX ADMIN — BUTALBITAL, ACETAMINOPHEN, AND CAFFEINE 1 TABLET: 50; 325; 40 TABLET ORAL at 09:19

## 2020-03-11 RX ADMIN — METOCLOPRAMIDE 10 MG: 5 INJECTION, SOLUTION INTRAMUSCULAR; INTRAVENOUS at 15:36

## 2020-03-11 RX ADMIN — LORAZEPAM 1 MG: 2 INJECTION INTRAMUSCULAR; INTRAVENOUS at 22:07

## 2020-03-11 RX ADMIN — ACETAMINOPHEN 650 MG: 325 TABLET, FILM COATED ORAL at 15:35

## 2020-03-11 RX ADMIN — RISPERIDONE 2 MG: 0.5 TABLET ORAL at 04:10

## 2020-03-11 RX ADMIN — ACETAMINOPHEN 650 MG: 325 TABLET, FILM COATED ORAL at 03:09

## 2020-03-11 RX ADMIN — DIPHENHYDRAMINE HYDROCHLORIDE 50 MG: 50 INJECTION INTRAMUSCULAR; INTRAVENOUS at 22:38

## 2020-03-11 RX ADMIN — LEVETIRACETAM 1250 MG: 500 TABLET ORAL at 04:10

## 2020-03-11 RX ADMIN — METOCLOPRAMIDE 10 MG: 5 INJECTION, SOLUTION INTRAMUSCULAR; INTRAVENOUS at 09:20

## 2020-03-11 RX ADMIN — DIPHENHYDRAMINE HYDROCHLORIDE 50 MG: 50 INJECTION INTRAMUSCULAR; INTRAVENOUS at 10:12

## 2020-03-11 RX ADMIN — DIVALPROEX SODIUM 250 MG: 250 TABLET, DELAYED RELEASE ORAL at 11:44

## 2020-03-11 RX ADMIN — RISPERIDONE 2 MG: 0.5 TABLET ORAL at 17:36

## 2020-03-11 RX ADMIN — OXYCODONE HYDROCHLORIDE 5 MG: 5 TABLET ORAL at 05:07

## 2020-03-11 RX ADMIN — LORAZEPAM 1 MG: 2 INJECTION INTRAMUSCULAR; INTRAVENOUS at 15:36

## 2020-03-11 RX ADMIN — ACETAMINOPHEN 650 MG: 325 TABLET, FILM COATED ORAL at 09:19

## 2020-03-11 RX ADMIN — LEVETIRACETAM 1250 MG: 500 TABLET ORAL at 17:36

## 2020-03-11 RX ADMIN — DIVALPROEX SODIUM 500 MG: 500 TABLET, DELAYED RELEASE ORAL at 17:36

## 2020-03-11 RX ADMIN — LORAZEPAM 1 MG: 2 INJECTION INTRAMUSCULAR; INTRAVENOUS at 09:19

## 2020-03-11 RX ADMIN — DIPHENHYDRAMINE HYDROCHLORIDE 50 MG: 50 INJECTION INTRAMUSCULAR; INTRAVENOUS at 04:10

## 2020-03-11 RX ADMIN — METOCLOPRAMIDE 10 MG: 5 INJECTION, SOLUTION INTRAMUSCULAR; INTRAVENOUS at 22:06

## 2020-03-11 RX ADMIN — ASPIRIN 325 MG: 325 TABLET, FILM COATED ORAL at 04:10

## 2020-03-11 RX ADMIN — DIVALPROEX SODIUM 250 MG: 250 TABLET, DELAYED RELEASE ORAL at 05:00

## 2020-03-11 RX ADMIN — KETOROLAC TROMETHAMINE 30 MG: 30 INJECTION, SOLUTION INTRAMUSCULAR at 09:19

## 2020-03-11 RX ADMIN — BUTALBITAL, ACETAMINOPHEN, AND CAFFEINE 1 TABLET: 50; 325; 40 TABLET ORAL at 03:09

## 2020-03-11 RX ADMIN — MAGNESIUM SULFATE IN DEXTROSE 1 G: 10 INJECTION, SOLUTION INTRAVENOUS at 05:38

## 2020-03-11 ASSESSMENT — ENCOUNTER SYMPTOMS
WEAKNESS: 0
NAUSEA: 1
BLURRED VISION: 0
ABDOMINAL PAIN: 0
BLOOD IN STOOL: 0
SHORTNESS OF BREATH: 0
INSOMNIA: 1
DIZZINESS: 0
MYALGIAS: 0
CHILLS: 0
HEADACHES: 1
SORE THROAT: 0
DIARRHEA: 0
VOMITING: 0
FEVER: 0
FOCAL WEAKNESS: 0
CONSTIPATION: 0
PALPITATIONS: 0
DEPRESSION: 0
HEARTBURN: 0

## 2020-03-11 NOTE — PROGRESS NOTES
"Pt requested to speak to the doctor bedside, called doctor Rodriguez and spoke to him, he stated he was not able to come talk to her. Pt is upset that \"we are not giving her anything that is working\" and wants more and something that will work. She states her pain has not been below a 7 all day. I have given the pt acetaminophen, Fioricet, Benadryl and Roxicodone, and Toradol. Pt is crying throughout the whole conversation and states that \"I need to keep calling the doctor so he will give her at least a one time dose\". Pt then requested to speak to the charge nurse. Lilia came in and spoke to the pt.   "

## 2020-03-11 NOTE — PROGRESS NOTES
Paged MD Min for questions per  and updated regarding MRI status. Per MD Min, no ketamine will be ordered.  and pt aware.      Left message for Andre- [phone] per 's request bc per , spinal stim can be scanned.

## 2020-03-11 NOTE — CARE PLAN
Problem: Knowledge Deficit  Goal: Knowledge of the prescribed therapeutic regimen will improve  Outcome: PROGRESSING AS EXPECTED  Note: Education about frequency of pain medication was taught to the pt.      Problem: Pain Management  Goal: Pain level will decrease to patient's comfort goal  Outcome: PROGRESSING AS EXPECTED  Intervention: Follow pain managment plan developed in collaboration with patient and Interdisciplinary Team  Note: Pt has pain medication to keep headache under control.

## 2020-03-11 NOTE — PROGRESS NOTES
"HOSPITAL MEDICINE PROGRESS NOTE    Date of service  3/11/2020    Chief Complaint  Migraine (x a month) and Weakness (left sided, since 3am. \"it started out tingly, like pins and needles, now its gone numb. I've had one full blown stroke and several TIA's\" )      Hospital Course:     49 y.o. female admitted 3/8/2020 with headache and left-sided weakness, that has now resolved.  Etiology of the left-sided transient weakness most likely related to her migraine headache, localized to the right side.  Stroke work-up on initial presentation including CT angios of the head and neck were negative.  Unfortunately, the presence of a spine stimulator is not compatible with brain MRI.          Interval History Updates:  No event overnight.  Afebrile.    The patient has requested a new attending.  I was asked to see the patient today.  In speaking to the patient, headache is persistent, right posterior right orbital region, pounding in nature with some sharp spiking.  Associated with photophobia.  She is allergic to the triptans, the reaction to triptan apparently \"heart stop.\"  Also report moderate success with DHE treatment in the past.  Patient feels that the most effective prophylactic has been Depakote, but at a higher level, which would decrease due to recommendations of neurology that the patient seen during last admission for the same presentation.  At home, the patient has prescription for IM Toradol and IM Benadryl.  He has an appointment with a neurologist in [facility] sometime in the near future.  She does not recall the exact date.    Neurology consultation note by Dr. Juárez, on 02/23/2020, was reviewed personally.  Neurology at the time, recommend to increase her Depakote to 5 mg p.o. 3 times daily, Keppra 750 mg p.o. twice daily, and continue Toradol as needed.  Patient was also recommended to see a migraine specialist in the outpatient setting.    History of present illness, H&P, and progress " note were reviewed.    Consultants/Specialty  Neurology pending evaluation    Review of Systems   Review of Systems   Constitutional: Negative for chills and fever.   HENT: Negative for ear pain and sore throat.    Eyes: Negative for blurred vision.   Respiratory: Negative for shortness of breath.    Cardiovascular: Negative for chest pain, palpitations and leg swelling.   Gastrointestinal: Positive for nausea. Negative for abdominal pain, blood in stool, constipation, diarrhea, heartburn, melena and vomiting.   Genitourinary: Negative for dysuria, hematuria and urgency.   Musculoskeletal: Negative for myalgias.   Skin: Negative for rash.   Neurological: Positive for headaches. Negative for dizziness, focal weakness and weakness.   Endo/Heme/Allergies: Negative.    Psychiatric/Behavioral: Negative for depression. The patient has insomnia.    All other systems reviewed and are negative.       Medications:  • metoclopramide  10 mg Q6HRS PRN   • LORazepam  1 mg Q6HRS PRN   • diphenhydrAMINE  50 mg Q6HRS PRN   • aspirin  325 mg DAILY   • magnesium sulfate  1 g BID PRN   • senna-docusate  2 Tab BID    And   • polyethylene glycol/lytes  1 Packet QDAY PRN    And   • magnesium hydroxide  30 mL QDAY PRN    And   • bisacodyl  10 mg QDAY PRN   • Respiratory Therapy Consult   Continuous RT   • acetaminophen  650 mg Q6HRS PRN   • levETIRAcetam  1,250 mg BID   • risperiDONE  2 mg BID   • divalproex  250 mg BID   • divalproex  500 mg Q EVENING   • ketorolac  30 mg BID PRN       Physical Exam   Vitals:    03/10/20 1951 03/11/20 0000 03/11/20 0400 03/11/20 0800   BP: 148/93 131/87 106/69 115/55   Pulse: 99 78 66 90   Resp: 16 16 16 16   Temp: 36.8 °C (98.2 °F) 36.6 °C (97.8 °F) 36.5 °C (97.7 °F) 36.1 °C (97 °F)   TempSrc: Temporal Temporal Temporal Temporal   SpO2: 97% 95% 100% 100%   Weight:       Height:           Physical Exam   Constitutional: She is oriented to person, place, and time. She appears distressed.   HENT:   Head:  Normocephalic and atraumatic.   Eyes: Pupils are equal, round, and reactive to light. Conjunctivae are normal. No scleral icterus.   Neck: Normal range of motion. Neck supple.   Cardiovascular: Normal rate, regular rhythm and intact distal pulses. Exam reveals no gallop and no friction rub.   No murmur heard.  Pulmonary/Chest: Effort normal and breath sounds normal. No respiratory distress. She has no wheezes. She has no rales. She exhibits no tenderness.   Abdominal: Soft. Bowel sounds are normal. She exhibits no distension and no mass. There is no abdominal tenderness. There is no rebound and no guarding.   Musculoskeletal: Normal range of motion.         General: No tenderness or edema.   Neurological: She is alert and oriented to person, place, and time.   Skin: Skin is warm and dry. She is not diaphoretic.   Psychiatric: Mood and affect normal.   Vitals reviewed.         Laboratory   Recent Labs     03/08/20  1456 03/09/20  0501 03/11/20  0442   WBC 6.5 4.0* 5.7   RBC 4.54 3.97* 4.36   HEMOGLOBIN 13.6 11.7* 12.7   HEMATOCRIT 41.2 35.7* 38.2   PLATELETCT 335 245 296     Recent Labs     03/09/20  0501 03/10/20  0857 03/11/20  0442   SODIUM 139 139 132*   POTASSIUM 4.1 4.2 3.9   CHLORIDE 106 104 101   CO2 24 27 24   GLUCOSE 102* 95 92   BUN 7* 8 8   CREATININE 0.67 0.54 0.56      Recent Labs     03/08/20  1456 03/09/20  0501 03/10/20  0857 03/11/20  0442   ALTSGPT 140* 139*  --   --    ASTSGOT 125* 78*  --   --    ALKPHOSPHAT 168* 154*  --   --    TBILIRUBIN 0.6 0.4  --   --    GLUCOSE 55* 102* 95 92      Recent Labs     03/08/20  1456   APTT 24.7   INR 0.99           Microbiology  Results     ** No results found for the last 168 hours. **             Labs reviewed by me and noted above.    Radiology  EC-ECHOCARDIOGRAM COMPLETE W/O CONT  Transthoracic  Echo Report    Echocardiography Laboratory    CONCLUSIONS  Left ventricular ejection fraction is visually estimated to be 65%.  Mild tricuspid  regurgitation.  Estimated right ventricular systolic pressure is  20 mmHg.  Negative bubble study including Valsalva.    BRIGID DAWSON  Exam Date:         2020                      09:58  Exam Location:     Inpatient  Priority:          Routine    Ordering Physician:        ORTIZ DOMINGUEZ  Referring Physician:       [id number] Cohen  Sonographer:               BRITTANY Anderson    Age:    49     Gender:    F  MRN:    [id number]  :    [id number]  BSA:    1.77   Ht (in):    63     Wt (lb):    163  Exam Type:     Complete    Indications:     Transient cerebral ischemic attack, unspecified  ICD Codes:       G45.9    CPT Codes:       21912    BP:   106    /   55     HR:   70  Technical Quality:       Fair    MEASUREMENTS  (Male / Female) Normal Values  2D ECHO  LV Diastolic Diameter PLAX        4.4 cm                4.2 - 5.9 / 3.9 - 5.3   cm  LV Systolic Diameter PLAX         2.6 cm                2.1 - 4.0 cm  IVS Diastolic Thickness           0.7 cm                  LVPW Diastolic Thickness          0.92 cm                 LVOT Diameter                     2 cm                    Estimated LV Ejection Fraction    65 %                    LV Ejection Fraction MOD BP       64.4 %                >= 55  %  LV Ejection Fraction MOD 4C       66.1 %                  LV Ejection Fraction MOD 2C       64.5 %                  IVC Diameter                      1.7 cm                    DOPPLER  AV Peak Velocity                  1.2 m/s                 AV Peak Gradient                  5.7 mmHg                AV Mean Gradient                  3.3 mmHg                LVOT Peak Velocity                0.88 m/s                AV Area Cont Eq vti               2.2 cm2                 Mitral E Point Velocity           0.95 m/s                Mitral E to A Ratio               1.3                     Mitral A Duration                 101 ms                  MV  Pressure Half Time             45.8 ms                 MV Area PHT                       4.8 cm2                 MV Deceleration Time              158 ms                  TR Peak Velocity                  192 cm/s                PV Peak Velocity                  0.78 m/s                PV Peak Gradient                  2.4 mmHg                RVOT Peak Velocity                0.54 m/s                  * Indicates values subject to auto-interpretation  LV EF:  65    %    FINDINGS  Left Ventricle  Normal left ventricular chamber size. Normal left ventricular wall   thickness. Normal left ventricular systolic function. Left ventricular   ejection fraction is visually estimated to be 65%. Normal regional wall   motion. Normal diastolic function.    Right Ventricle  The right ventricle was normal in size and function.    Right Atrium  The right atrium is normal in size.  Normal inferior vena cava size and   inspiratory collapse.    Left Atrium  The left atrium is normal in size.  Left atrial volume index is 21   mL/sq m.  Negative bubble study including Valsalva.    Mitral Valve  Structurally normal mitral valve without significant stenosis. Trace   mitral regurgitation.    Aortic Valve  Tricuspid aortic valve. No stenosis or regurgitation seen.    Tricuspid Valve  Structurally normal tricuspid valve without significant stenosis. Mild   tricuspid regurgitation. Estimated right ventricular systolic pressure   is  20 mmHg. Right atrial pressure is estimated to be 3 mmHg.    Pulmonic Valve  Structurally normal pulmonic valve without significant stenosis. The   pulmonic valve is not well visualized. No pulmonic stenosis. Mild   pulmonic insufficiency.    Pericardium  Normal pericardium without effusion.    Aorta  The aortic root is normal.  Ascending aorta diameter is 2.8 cm.    Monisha Palmer MD  (Electronically Signed)  Final Date:     09 March 2020                   14:52      Results for orders placed or performed  during the hospital encounter of 10/29/14   2-D ECHO W/DOPPLER (ECHOCARDIOGRAM COMP W/O CONT)   Result Value Ref Range    Eject.Frac. MOD BP 72.63     Eject.Frac. MOD 4C 71.9     Eject.Frac. MOD 2C 69.76           Assessment and Plan    Active Problems:    Complicated migraine POA: Yes    Left-sided weakness POA: Yes    S/P  shunt POA: Yes    Dehydration POA: Yes    Anxiety POA: Yes    Depression POA: Yes    Seizures (HCC) POA: Yes  Resolved Problems:    * No resolved hospital problems. *      The allergy to the triptan makes acute treatment of her migraine headache difficult.  The relative increase in her liver function panel is relative contraindication to increase her Depakote.  Her insomnia undoubtedly contribute to her recurrent and continuous headache.    Therefore, we will continue current management with Depakote, Keppra, Risperdal, and Benadryl as these are home medication.    I discussed with the patient that I would try to avoid narcotics as a modality for her headache treatment given that they are likely ineffective and has high addiction potential.    Instead, we will try Ativan we will also help with her anxiety, insomnia.  Restart Reglan.  Reglan side effect and toxicity especially tardive dyskinesia was discussed with the patient.  She is acceptable to the risk versus benefit.    DVT prophylaxis: SCD    CODE STATUS:  FULL CODE    Disposition: Anticipate Home in 1-2 days.    Case was discussed with Primary RN, Charge Nurse, Medical SW, , and Pharmacist on IDTround and Pharmacist in addition to individual(s) mentioned above.    I have performed a physical exam and reviewed and updated ROS as of today, 3/11/2020.  In review of yesterday's note dated, 3/10/2020, there are no changes except as documented above.    I spent 42 minutes in evaluating and treating the patient, more than 50% of the time in direct evaluation and coordinating care.      Jacek Grossman M.D.

## 2020-03-11 NOTE — PROGRESS NOTES
Pt requested to speak with CN. Arrived at bedside and pt was clearly upset, tearful, stated she wanted more pain medication or she will leave. Educated pt she may leave against medical advice and follow up with her pain specialist outpatient but encouraged her to stay for treatment until she is medically cleared. Pt demanded the RN call the hospitalist at least 3 times to ask for pain medications. Informed pt that Rocio FIGUEROA has spoken with Dr. Rodriguez and no new orders were received. Apologized to pt and provided emotional support and active listening. Pt was agreeable to stay for care and for PRN Toradol.

## 2020-03-11 NOTE — CARE PLAN
Problem: Safety  Goal: Will remain free from injury  Outcome: PROGRESSING AS EXPECTED  Note: Clutter free environment, treaded socks on     Problem: Bowel/Gastric:  Goal: Normal bowel function is maintained or improved  Outcome: PROGRESSING AS EXPECTED  Note: MAXIMILIAN yesterday

## 2020-03-11 NOTE — PROGRESS NOTES
Assumed care of pt at 1900. Got report at bedside by day shift nurse. Assessment completed, pt A&Ox 4. Pt has chronic headache pain varying from 7-10/10. Bed in lowest position, bed locked, bed alarm on, call light and possessions in reach.

## 2020-03-12 VITALS
OXYGEN SATURATION: 96 % | TEMPERATURE: 99.1 F | RESPIRATION RATE: 16 BRPM | DIASTOLIC BLOOD PRESSURE: 55 MMHG | SYSTOLIC BLOOD PRESSURE: 100 MMHG | BODY MASS INDEX: 28.91 KG/M2 | HEART RATE: 95 BPM | HEIGHT: 63 IN | WEIGHT: 163.14 LBS

## 2020-03-12 LAB
ALBUMIN SERPL BCP-MCNC: 3.9 G/DL (ref 3.2–4.9)
ALBUMIN SERPL BCP-MCNC: 4.1 G/DL (ref 3.2–4.9)
ALBUMIN/GLOB SERPL: 1.6 G/DL
ALBUMIN/GLOB SERPL: 1.7 G/DL
ALP SERPL-CCNC: 118 U/L (ref 30–99)
ALP SERPL-CCNC: 200 U/L (ref 30–99)
ALT SERPL-CCNC: 405 U/L (ref 2–50)
ALT SERPL-CCNC: 50 U/L (ref 2–50)
AMPHET UR QL SCN: NEGATIVE
ANION GAP SERPL CALC-SCNC: 12 MMOL/L (ref 7–16)
ANION GAP SERPL CALC-SCNC: 17 MMOL/L (ref 7–16)
AST SERPL-CCNC: 14 U/L (ref 12–45)
AST SERPL-CCNC: 653 U/L (ref 12–45)
BARBITURATES UR QL SCN: POSITIVE
BENZODIAZ UR QL SCN: NEGATIVE
BILIRUB SERPL-MCNC: 0.3 MG/DL (ref 0.1–1.5)
BILIRUB SERPL-MCNC: 0.8 MG/DL (ref 0.1–1.5)
BUN SERPL-MCNC: 10 MG/DL (ref 8–22)
BUN SERPL-MCNC: 9 MG/DL (ref 8–22)
BZE UR QL SCN: NEGATIVE
CALCIUM SERPL-MCNC: 9.2 MG/DL (ref 8.4–10.2)
CALCIUM SERPL-MCNC: 9.7 MG/DL (ref 8.5–10.5)
CANNABINOIDS UR QL SCN: NEGATIVE
CHLORIDE SERPL-SCNC: 101 MMOL/L (ref 96–112)
CHLORIDE SERPL-SCNC: 98 MMOL/L (ref 96–112)
CO2 SERPL-SCNC: 20 MMOL/L (ref 20–33)
CO2 SERPL-SCNC: 21 MMOL/L (ref 20–33)
CREAT SERPL-MCNC: 0.48 MG/DL (ref 0.5–1.4)
CREAT SERPL-MCNC: 0.68 MG/DL (ref 0.5–1.4)
EKG IMPRESSION: NORMAL
GLOBULIN SER CALC-MCNC: 2.4 G/DL (ref 1.9–3.5)
GLOBULIN SER CALC-MCNC: 2.5 G/DL (ref 1.9–3.5)
GLUCOSE SERPL-MCNC: 102 MG/DL (ref 65–99)
GLUCOSE SERPL-MCNC: 109 MG/DL (ref 65–99)
MAGNESIUM SERPL-MCNC: 1.4 MG/DL (ref 1.5–2.5)
METHADONE UR QL SCN: NEGATIVE
OPIATES UR QL SCN: NEGATIVE
OXYCODONE UR QL SCN: POSITIVE
PCP UR QL SCN: NEGATIVE
POTASSIUM SERPL-SCNC: 3.1 MMOL/L (ref 3.6–5.5)
POTASSIUM SERPL-SCNC: 4.1 MMOL/L (ref 3.6–5.5)
PROPOXYPH UR QL SCN: NEGATIVE
PROT SERPL-MCNC: 6.4 G/DL (ref 6–8.2)
PROT SERPL-MCNC: 6.5 G/DL (ref 6–8.2)
SODIUM SERPL-SCNC: 134 MMOL/L (ref 135–145)
SODIUM SERPL-SCNC: 135 MMOL/L (ref 135–145)

## 2020-03-12 PROCEDURE — 36415 COLL VENOUS BLD VENIPUNCTURE: CPT

## 2020-03-12 PROCEDURE — 302083 SET,INFUSION NEEDLE 20 X 3/4": Performed by: INTERNAL MEDICINE

## 2020-03-12 PROCEDURE — 80307 DRUG TEST PRSMV CHEM ANLYZR: CPT

## 2020-03-12 PROCEDURE — 700111 HCHG RX REV CODE 636 W/ 250 OVERRIDE (IP): Performed by: INTERNAL MEDICINE

## 2020-03-12 PROCEDURE — 96372 THER/PROPH/DIAG INJ SC/IM: CPT

## 2020-03-12 PROCEDURE — 700111 HCHG RX REV CODE 636 W/ 250 OVERRIDE (IP): Performed by: PSYCHIATRY & NEUROLOGY

## 2020-03-12 PROCEDURE — 700105 HCHG RX REV CODE 258: Performed by: INTERNAL MEDICINE

## 2020-03-12 PROCEDURE — G0378 HOSPITAL OBSERVATION PER HR: HCPCS

## 2020-03-12 PROCEDURE — 96376 TX/PRO/DX INJ SAME DRUG ADON: CPT

## 2020-03-12 PROCEDURE — 306578 KIT,PORT ACCESS 20G X 1.5INCH: Performed by: INTERNAL MEDICINE

## 2020-03-12 PROCEDURE — A9270 NON-COVERED ITEM OR SERVICE: HCPCS | Performed by: INTERNAL MEDICINE

## 2020-03-12 PROCEDURE — 700102 HCHG RX REV CODE 250 W/ 637 OVERRIDE(OP): Performed by: PSYCHIATRY & NEUROLOGY

## 2020-03-12 PROCEDURE — 99226 PR SUBSEQUENT OBSERVATION CARE,LEVEL III: CPT | Performed by: INTERNAL MEDICINE

## 2020-03-12 PROCEDURE — 700102 HCHG RX REV CODE 250 W/ 637 OVERRIDE(OP): Performed by: INTERNAL MEDICINE

## 2020-03-12 PROCEDURE — 99217 PR OBSERVATION CARE DISCHARGE: CPT | Performed by: INTERNAL MEDICINE

## 2020-03-12 PROCEDURE — 80053 COMPREHEN METABOLIC PANEL: CPT | Mod: 91

## 2020-03-12 PROCEDURE — 93010 ELECTROCARDIOGRAM REPORT: CPT | Performed by: INTERNAL MEDICINE

## 2020-03-12 PROCEDURE — 96366 THER/PROPH/DIAG IV INF ADDON: CPT

## 2020-03-12 PROCEDURE — 83735 ASSAY OF MAGNESIUM: CPT

## 2020-03-12 PROCEDURE — 93005 ELECTROCARDIOGRAM TRACING: CPT | Performed by: INTERNAL MEDICINE

## 2020-03-12 PROCEDURE — 99214 OFFICE O/P EST MOD 30 MIN: CPT | Performed by: PSYCHIATRY & NEUROLOGY

## 2020-03-12 PROCEDURE — A9270 NON-COVERED ITEM OR SERVICE: HCPCS | Performed by: PSYCHIATRY & NEUROLOGY

## 2020-03-12 PROCEDURE — 700105 HCHG RX REV CODE 258: Performed by: PSYCHIATRY & NEUROLOGY

## 2020-03-12 RX ORDER — DIPHENHYDRAMINE HYDROCHLORIDE 50 MG/ML
25 INJECTION INTRAMUSCULAR; INTRAVENOUS ONCE
Status: DISCONTINUED | OUTPATIENT
Start: 2020-03-12 | End: 2020-03-12

## 2020-03-12 RX ORDER — SODIUM CHLORIDE 9 MG/ML
INJECTION, SOLUTION INTRAVENOUS CONTINUOUS
Status: DISCONTINUED | OUTPATIENT
Start: 2020-03-12 | End: 2020-03-13 | Stop reason: HOSPADM

## 2020-03-12 RX ORDER — PREDNISONE 10 MG/1
10 TABLET ORAL DAILY
Status: DISCONTINUED | OUTPATIENT
Start: 2020-03-17 | End: 2020-03-13 | Stop reason: HOSPADM

## 2020-03-12 RX ORDER — PREDNISONE 50 MG/1
50 TABLET ORAL DAILY
Status: DISCONTINUED | OUTPATIENT
Start: 2020-03-13 | End: 2020-03-13 | Stop reason: HOSPADM

## 2020-03-12 RX ORDER — PREDNISONE 20 MG/1
20 TABLET ORAL DAILY
Status: DISCONTINUED | OUTPATIENT
Start: 2020-03-16 | End: 2020-03-13 | Stop reason: HOSPADM

## 2020-03-12 RX ORDER — SODIUM CHLORIDE 9 MG/ML
1000 INJECTION, SOLUTION INTRAVENOUS ONCE
Status: COMPLETED | OUTPATIENT
Start: 2020-03-12 | End: 2020-03-12

## 2020-03-12 RX ORDER — ONDANSETRON 4 MG/1
4 TABLET, ORALLY DISINTEGRATING ORAL EVERY 4 HOURS PRN
Status: DISCONTINUED | OUTPATIENT
Start: 2020-03-12 | End: 2020-03-13 | Stop reason: HOSPADM

## 2020-03-12 RX ORDER — LORAZEPAM 2 MG/ML
1 INJECTION INTRAMUSCULAR ONCE
Status: COMPLETED | OUTPATIENT
Start: 2020-03-12 | End: 2020-03-12

## 2020-03-12 RX ORDER — METOCLOPRAMIDE HYDROCHLORIDE 5 MG/ML
10 INJECTION INTRAMUSCULAR; INTRAVENOUS ONCE
Status: COMPLETED | OUTPATIENT
Start: 2020-03-12 | End: 2020-03-12

## 2020-03-12 RX ORDER — MAGNESIUM SULFATE HEPTAHYDRATE 40 MG/ML
2 INJECTION, SOLUTION INTRAVENOUS ONCE
Status: COMPLETED | OUTPATIENT
Start: 2020-03-12 | End: 2020-03-12

## 2020-03-12 RX ORDER — PREDNISONE 20 MG/1
40 TABLET ORAL DAILY
Status: DISCONTINUED | OUTPATIENT
Start: 2020-03-14 | End: 2020-03-13 | Stop reason: HOSPADM

## 2020-03-12 RX ORDER — DIVALPROEX SODIUM 250 MG/1
250 TABLET, DELAYED RELEASE ORAL ONCE
Status: COMPLETED | OUTPATIENT
Start: 2020-03-12 | End: 2020-03-12

## 2020-03-12 RX ORDER — DIVALPROEX SODIUM 500 MG/1
500 TABLET, DELAYED RELEASE ORAL EVERY 8 HOURS
Status: DISCONTINUED | OUTPATIENT
Start: 2020-03-12 | End: 2020-03-12

## 2020-03-12 RX ADMIN — METOCLOPRAMIDE 10 MG: 5 INJECTION, SOLUTION INTRAMUSCULAR; INTRAVENOUS at 14:18

## 2020-03-12 RX ADMIN — DIPHENHYDRAMINE HYDROCHLORIDE 50 MG: 50 INJECTION INTRAMUSCULAR; INTRAVENOUS at 20:30

## 2020-03-12 RX ADMIN — ACETAMINOPHEN 650 MG: 325 TABLET, FILM COATED ORAL at 07:50

## 2020-03-12 RX ADMIN — RISPERIDONE 2 MG: 0.5 TABLET ORAL at 16:51

## 2020-03-12 RX ADMIN — METOCLOPRAMIDE 10 MG: 5 INJECTION, SOLUTION INTRAMUSCULAR; INTRAVENOUS at 07:50

## 2020-03-12 RX ADMIN — RISPERIDONE 2 MG: 0.5 TABLET ORAL at 05:00

## 2020-03-12 RX ADMIN — DIVALPROEX SODIUM 250 MG: 250 TABLET, DELAYED RELEASE ORAL at 11:49

## 2020-03-12 RX ADMIN — LEVETIRACETAM 1250 MG: 500 TABLET ORAL at 16:50

## 2020-03-12 RX ADMIN — LEVETIRACETAM 1250 MG: 500 TABLET ORAL at 05:00

## 2020-03-12 RX ADMIN — DIPHENHYDRAMINE HYDROCHLORIDE 50 MG: 50 INJECTION INTRAMUSCULAR; INTRAVENOUS at 14:18

## 2020-03-12 RX ADMIN — ACETAMINOPHEN 650 MG: 325 TABLET, FILM COATED ORAL at 16:01

## 2020-03-12 RX ADMIN — DIVALPROEX SODIUM 250 MG: 250 TABLET, DELAYED RELEASE ORAL at 05:01

## 2020-03-12 RX ADMIN — LORAZEPAM 1 MG: 2 INJECTION INTRAMUSCULAR; INTRAVENOUS at 18:29

## 2020-03-12 RX ADMIN — DIPHENHYDRAMINE HYDROCHLORIDE 50 MG: 50 INJECTION INTRAMUSCULAR; INTRAVENOUS at 07:50

## 2020-03-12 RX ADMIN — ASPIRIN 325 MG: 325 TABLET, FILM COATED ORAL at 05:01

## 2020-03-12 RX ADMIN — ONDANSETRON 4 MG: 4 TABLET, ORALLY DISINTEGRATING ORAL at 14:51

## 2020-03-12 RX ADMIN — MAGNESIUM SULFATE 2 G: 2 INJECTION INTRAVENOUS at 14:18

## 2020-03-12 RX ADMIN — SODIUM CHLORIDE 1000 ML: 9 INJECTION, SOLUTION INTRAVENOUS at 17:06

## 2020-03-12 RX ADMIN — LORAZEPAM 1 MG: 2 INJECTION INTRAMUSCULAR; INTRAVENOUS at 11:49

## 2020-03-12 RX ADMIN — PREDNISONE 60 MG: 50 TABLET ORAL at 14:18

## 2020-03-12 RX ADMIN — KETOROLAC TROMETHAMINE 30 MG: 30 INJECTION, SOLUTION INTRAMUSCULAR at 08:07

## 2020-03-12 RX ADMIN — DIVALPROEX SODIUM 250 MG: 250 TABLET, DELAYED RELEASE ORAL at 14:51

## 2020-03-12 RX ADMIN — SODIUM CHLORIDE: 9 INJECTION, SOLUTION INTRAVENOUS at 14:18

## 2020-03-12 RX ADMIN — LORAZEPAM 1 MG: 2 INJECTION INTRAMUSCULAR; INTRAVENOUS at 07:50

## 2020-03-12 ASSESSMENT — ENCOUNTER SYMPTOMS
TREMORS: 1
WEAKNESS: 0
NAUSEA: 0
HEARTBURN: 0
DIZZINESS: 0
ABDOMINAL PAIN: 0
CHILLS: 0
CONSTIPATION: 0
SORE THROAT: 0
BLURRED VISION: 0
FEVER: 0
BLOOD IN STOOL: 0
SHORTNESS OF BREATH: 0
PALPITATIONS: 0
FOCAL WEAKNESS: 0
MYALGIAS: 0
DIARRHEA: 0
VOMITING: 0
HEADACHES: 1
DEPRESSION: 0

## 2020-03-12 NOTE — CONSULTS
"Neurology Initial Consult H&P  Neurohospitalist Service, [facility]    Referring Physician: Jacek Grossman M.D.    Chief Complaint   Patient presents with   • Migraine     x a month   • Weakness     left sided, since 3am. \"it started out tingly, like pins and needles, now its gone numb. I've had one full blown stroke and several TIA's\"        HPI: Enedelia Pang is a 49 y.o. female was admitted on 3/8 for intractable migraine headache her past medical history is significant for complex migraine(since age of 14), s/p Occipital nerve stimulator in 2003, intractable status post  shunt in 2017 for pseudotumor cerebri.neurology was consulted for treatment options for intractable migraine.      Patient intractable headache as long as she can remember, for last 10 years headache has been constant, 5 out of 10.  She was recently admitted in the last month for similar complaints when her Depakote dose was increased to 500 3 times daily.  When she saw her family medicine doctor for hospital follow-up her liver enzymes were noted to increase so her Depakote dose was switched back to her previous dose (250 in the morning, 250 during lunch and 500 at night).  Since then she has been having increased throbbing right-sided headache 7-8/10 intensity, continuous, associated with nausea, photophobia, phonophobia.  She also has been experiencing left-sided numbness and tingling more prominent in upper extremity.  Patient has had numerous ER visits, multiple hospital admissions for similar complaints in last couple of months.  Currently she is on Depakote 250 BID, 500 at night, Keppra 1250 BID, Ketorolac 30 mg IM BID PRN, Benadryl 50 mg every 6 hour as needed, magnesium 1 g twice daily as needed, Reglan 10 mg every 6 hours as needed.  She is allergic to triptans (cardiac arrest) and Compazine.  She states that she has been tried with DHE before which provided variable effect.    Review of systems: In addition " "to what is detailed in the HPI above, (and scanned into the chart if and when applicable), all other systems reviewed and are negative.    Past Medical History:    has a past medical history of Allergy, unspecified not elsewhere classified, Anemia (10/05/2017), Anesthesia, Anxiety, Anxiety, generalized, Arthritis, autoimmune, Autoimmune disorder ([facility]), Back pain, Clostridium difficile diarrhea (2014), Depression, Elevated liver enzymes (2017), Fall, H/O Clostridium difficile infection (2014), MRSA infection (2003), Hydrocephalus ([facility]) (2015), Joint pain (09/10/2019), Migraine with aura, with intractable migraine, so stated, without mention of status migrainosus, MRSA (methicillin resistant Staphylococcus aureus) (08/2015), MRSA (methicillin resistant Staphylococcus aureus) (12/2017), MRSA (methicillin resistant Staphylococcus aureus) (2018), Pituitary abnormality ([facility]) (2002), Requires supplemental oxygen, Seizure ([facility]) (started 2017), Staph infection, and Stroke (Formerly [facility]) (2007). She also has no past medical history of Addisons disease ([facility]ly [facility]) or Anginal syndrome ([facility]).    FHx:  family history includes Alcohol abuse in her father; Diabetes in her father; Mult Sclerosis in her mother.    SHx:   reports that she has never smoked. She has never used smokeless tobacco. She reports previous alcohol use. She reports that she does not use drugs.    Allergies:  Allergies   Allergen Reactions   • Sumatriptan Anaphylaxis     Historical=\"Heart stops.\" Reaction  between 1995 to 1997   • Prochlorperazine Rash and Swelling     Tongue swelling. Reaction as a teen. (compazine)  Tolerated Phenergan on 02/24/15   • Vancomycin Shortness of Breath and Rash     Reaction in 2005.  D/W patient 8/31/15 - tolerated loading dose of vancomycin administered on 8/30/15 with some itching to chest with decreased infusion rate. Red Man Syndrome.  2018-tolerated slow drip with benadryl pre-med-had no problems.       Medications:    Current " Facility-Administered Medications:   •  metoclopramide (REGLAN) injection 10 mg, 10 mg, Intravenous, Q6HRS PRN, Jacek Grossman M.D., 10 mg at 03/12/20 0750  •  LORazepam (ATIVAN) injection 1 mg, 1 mg, Intravenous, Q6HRS PRN, Jacek Grossman M.D., 1 mg at 03/12/20 0750  •  diphenhydrAMINE (BENADRYL) injection 50 mg, 50 mg, Intravenous, Q6HRS PRN, Ashish Min M.D., 50 mg at 03/12/20 0750  •  aspirin (ASA) tablet 325 mg, 325 mg, Oral, DAILY, 325 mg at 03/12/20 0501 **OR** [DISCONTINUED] aspirin (ASA) chewable tab 324 mg, 324 mg, Oral, DAILY **OR** [DISCONTINUED] aspirin (ASA) suppository 300 mg, 300 mg, Rectal, DAILY, Mino Johnson M.D.  •  Magnesium Sulfate in D5W IVPB premix 1 g, 1 g, Intravenous, BID PRN, Venkata Hooper M.D., Stopped at 03/11/20 0638  •  senna-docusate (PERICOLACE or SENOKOT S) 8.6-50 MG per tablet 2 Tab, 2 Tab, Oral, BID, 2 Tab at 03/10/20 0454 **AND** polyethylene glycol/lytes (MIRALAX) PACKET 1 Packet, 1 Packet, Oral, QDAY PRN **AND** magnesium hydroxide (MILK OF MAGNESIA) suspension 30 mL, 30 mL, Oral, QDAY PRN **AND** bisacodyl (DULCOLAX) suppository 10 mg, 10 mg, Rectal, QDAY PRN, Mino Johnson M.D.  •  Respiratory Therapy Consult, , Nebulization, Continuous RT, Mino Johnson M.D.  •  acetaminophen (TYLENOL) tablet 650 mg, 650 mg, Oral, Q6HRS PRN, Mino Johnson M.D., 650 mg at 03/12/20 0750  •  levETIRAcetam (KEPPRA) tablet 1,250 mg, 1,250 mg, Oral, BID, Mino Johnson M.D., 1,250 mg at 03/12/20 0500  •  risperiDONE (RISPERDAL) tablet 2 mg, 2 mg, Oral, BID, Mino Johnson M.D., 2 mg at 03/12/20 0500  •  divalproex (DEPAKOTE) delayed-release tablet 250 mg, 250 mg, Oral, BID, Mino Johnson M.D., 250 mg at 03/12/20 1149  •  divalproex (DEPAKOTE) delayed-release tablet 500 mg, 500 mg, Oral, Q EVENING, Mino Johnson M.D., 500 mg at 03/11/20 1736  •  ketorolac (TORADOL) injection 30 mg, 30 mg, Intramuscular, BID PRN, Mino Renteria  ALFRED Johnson, 30 mg at 03/12/20 0807    Physical Examination:     Vitals:    03/11/20 2000 03/11/20 2329 03/12/20 0354 03/12/20 0700   BP: 102/76 110/72 120/81 139/96   Pulse: (!) 104 (!) 103 87 96   Resp: 16 16 16 18   Temp: 36.7 °C (98 °F) 36.6 °C (97.9 °F) 36.9 °C (98.5 °F) 36.9 °C (98.4 °F)   TempSrc: Temporal Temporal Temporal Temporal   SpO2: 97% 92% 98% 94%   Weight:       Height:           General: Patient is awake and in no acute distress  Eyes: examination of optic disks not indicated at this time  CV: RRR    NEUROLOGICAL EXAM:     Mental status: Awake, alert and fully oriented, follows commands  Speech and language: speech is clear and fluent. The patient is able to name and repeat.  Cranial nerve exam: Pupils are equal, round and reactive to light bilaterally. Visual fields are full. Extraocular muscles are intact. Sensation in the face is intact to light touch. Face is symmetric. Hearing to finger rub equal. Palate elevates symmetrically. Shoulder shrug is full. Tongue is midline.  Motor exam: Strength is 5/5 in all extremities both distally and proximally. Tone is normal. No abnormal movements were seen on exam.  Sensory exam: Left-sided deficit in face, upper extremity and lower extremity with perfect midline demarcation.  Deep tendon reflexes:  2+ and symmetric. Toes down-going bilaterally.  Coordination: no ataxia   Gait: deferred due to patient condition    Objective Data:    Labs:  Lab Results   Component Value Date/Time    PROTHROMBTM 13.3 03/08/2020 02:56 PM    INR 0.99 03/08/2020 02:56 PM      Lab Results   Component Value Date/Time    WBC 5.7 03/11/2020 04:42 AM    RBC 4.36 03/11/2020 04:42 AM    HEMOGLOBIN 12.7 03/11/2020 04:42 AM    HEMATOCRIT 38.2 03/11/2020 04:42 AM    MCV 87.6 03/11/2020 04:42 AM    MCH 29.1 03/11/2020 04:42 AM    MCHC 33.2 (L) 03/11/2020 04:42 AM    MPV 9.8 03/11/2020 04:42 AM    NEUTSPOLYS #FORCED 03/09/2020 05:01 AM    LYMPHOCYTES #FORCED 03/09/2020 05:01 AM     MONOCYTES #FORCED 03/09/2020 05:01 AM    EOSINOPHILS #FORCED 03/09/2020 05:01 AM    BASOPHILS #FORCED 03/09/2020 05:01 AM    HYPOCHROMIA 1+ 01/11/2017 02:29 PM    ANISOCYTOSIS 1+ 01/11/2017 02:29 PM      Lab Results   Component Value Date/Time    SODIUM 135 03/12/2020 05:08 AM    POTASSIUM 4.1 03/12/2020 05:08 AM    CHLORIDE 98 03/12/2020 05:08 AM    CO2 20 03/12/2020 05:08 AM    GLUCOSE 102 (H) 03/12/2020 05:08 AM    BUN 9 03/12/2020 05:08 AM    CREATININE 0.48 (L) 03/12/2020 05:08 AM      Lab Results   Component Value Date/Time    CHOLSTRLTOT 148 03/10/2020 08:57 AM    LDL 94 03/10/2020 08:57 AM    HDL 36 (A) 03/10/2020 08:57 AM    TRIGLYCERIDE 88 03/10/2020 08:57 AM       Lab Results   Component Value Date/Time    ALKPHOSPHAT 118 (H) 03/12/2020 05:08 AM    ASTSGOT 14 03/12/2020 05:08 AM    ALTSGPT 50 03/12/2020 05:08 AM    TBILIRUBIN 0.3 03/12/2020 05:08 AM        Imaging/Testing:    I interpreted and/or reviewed the patient's neuroimaging    EC-ECHOCARDIOGRAM COMPLETE W/O CONT   Final Result      CT-CTA NECK WITH & W/O-POST PROCESSING   Final Result      1.  Patent carotid and vertebral arteries. No evidence of occlusion or dissection.      2.  Intraspinal pain treatment lines present.      DX-CHEST-PORTABLE (1 VIEW)   Final Result         No acute cardiac or pulmonary abnormality is identified.      CT-CTA HEAD WITH & W/O-POST PROCESS   Final Result      CT angiogram of the [place] within normal limits.      CT-HEAD W/O   Final Result      1.  No evidence of acute intracranial process.      2.  Right frontal ventriculoperitoneal shunt catheter present. The ventricular volume is unchanged.      MR-BRAIN-WITH & W/O    (Results Pending)       Assessment and Plan:    Enedelia Pang is a 49 y.o. female with relevant history of complex migraine, status post  shunt for pseudotumor cerebri, presenting for intractable migraine headache whom neurology was consulted for treatment options. Looks like her  headache worsened after the Depakote level was decreased outpatient. On admission patient's Depakote level was below normal range. Her liver enzymes seems to have normalized now - we can try going back up on depakote. Also, any dehydration / sleep deprivation is going to worsen on going Migraine      Plan:  - Give 1 dose of migraine cocktail now for abortive therapy (Reglan + Benadryl)  - Give Mg 2gm IV now for headache   - Start Prednisone 60 mg for 3 days, then taper down over 4 more days.   - Resume previous dose of Depakote 500 mg TID  - Maintain hydration with IVF  - Try to maintain sleep hygiene   - Avoid narcotics if possible   - Monitor Liver function daily.   - Obtain MRI brain w/wo contrast if possible (if shunt and stimulator compatible)       Thank you for your consult.

## 2020-03-12 NOTE — PROGRESS NOTES
"HOSPITAL MEDICINE PROGRESS NOTE    Date of service  3/12/2020    Chief Complaint  Migraine (x a month) and Weakness (left sided, since 3am. \"it started out tingly, like pins and needles, now its gone numb. I've had one full blown stroke and several TIA's\" )      Hospital Course:     49 y.o. female admitted 3/8/2020 with headache and left-sided weakness, that has now resolved.  Etiology of the left-sided transient weakness most likely related to her migraine headache, localized to the right side.  Stroke work-up on initial presentation including CT angios of the head and neck were negative.  Unfortunately, the presence of a spine stimulator is not compatible with brain MRI per our Radiology department.    The patient has requested a new attending.  I was asked to see the patient starting yesterday.  In speaking to the patient, headache is persistent, right posterior right orbital region, pounding in nature with some sharp spiking.  Associated with photophobia.  She is allergic to the triptans, the reaction to triptan apparently \"heart stop,\" where she had to be shocked back into NSR.  Also report moderate success with DHE treatment in the past.  Patient feels that the most effective prophylactic has been Depakote, but at a higher level, which would decrease due to recommendations of neurology that the patient seen during last admission for the same presentation.  At home, the patient has prescription for IM Toradol and IM Benadryl.  He has an appointment with a neurologist in [facility] sometime in the near future.  She does not recall the exact date.    Neurology consultation note by Dr. Juárez, on 02/23/2020, was reviewed personally.  Neurology at the time, recommend to increase her Depakote to 5 mg p.o. 3 times daily, Keppra 750 mg p.o. twice daily, and continue Toradol as needed.  Patient was also recommended to see a migraine specialist in the outpatient setting.         Interval History " Updates:  No event overnight.  Afebrile.    C/o of tremors today  Also tachycardic,  on telemetry per my review    Consultants/Specialty  Neurology pending evaluation    Review of Systems   Review of Systems   Constitutional: Negative for chills and fever.   HENT: Negative for ear pain and sore throat.    Eyes: Negative for blurred vision.   Respiratory: Negative for shortness of breath.    Cardiovascular: Negative for chest pain, palpitations and leg swelling.   Gastrointestinal: Negative for abdominal pain, blood in stool, constipation, diarrhea, heartburn, melena, nausea and vomiting.   Genitourinary: Negative for dysuria, hematuria and urgency.   Musculoskeletal: Negative for myalgias.   Skin: Negative for rash.   Neurological: Positive for tremors and headaches. Negative for dizziness, focal weakness and weakness.   Endo/Heme/Allergies: Negative.    Psychiatric/Behavioral: Negative for depression.   All other systems reviewed and are negative.       Medications:  • metoclopramide  10 mg Q6HRS PRN   • LORazepam  1 mg Q6HRS PRN   • diphenhydrAMINE  50 mg Q6HRS PRN   • aspirin  325 mg DAILY   • magnesium sulfate  1 g BID PRN   • senna-docusate  2 Tab BID    And   • polyethylene glycol/lytes  1 Packet QDAY PRN    And   • magnesium hydroxide  30 mL QDAY PRN    And   • bisacodyl  10 mg QDAY PRN   • Respiratory Therapy Consult   Continuous RT   • acetaminophen  650 mg Q6HRS PRN   • levETIRAcetam  1,250 mg BID   • risperiDONE  2 mg BID   • divalproex  250 mg BID   • divalproex  500 mg Q EVENING   • ketorolac  30 mg BID PRN       Physical Exam   Vitals:    03/11/20 2000 03/11/20 2329 03/12/20 0354 03/12/20 0700   BP: 102/76 110/72 120/81 139/96   Pulse: (!) 104 (!) 103 87 96   Resp: 16 16 16 18   Temp: 36.7 °C (98 °F) 36.6 °C (97.9 °F) 36.9 °C (98.5 °F) 36.9 °C (98.4 °F)   TempSrc: Temporal Temporal Temporal Temporal   SpO2: 97% 92% 98% 94%   Weight:       Height:           Physical Exam   Constitutional: She is  oriented to person, place, and time. She appears distressed.   HENT:   Head: Normocephalic and atraumatic.   Eyes: Pupils are equal, round, and reactive to light. Conjunctivae are normal. No scleral icterus.   Neck: Normal range of motion. Neck supple.   Cardiovascular: Regular rhythm and intact distal pulses. Exam reveals no gallop and no friction rub.   No murmur heard.  Pulmonary/Chest: Effort normal and breath sounds normal. No respiratory distress. She has no wheezes. She has no rales. She exhibits no tenderness.   Abdominal: Soft. Bowel sounds are normal. She exhibits no distension and no mass. There is no abdominal tenderness. There is no rebound and no guarding.   Musculoskeletal: Normal range of motion.         General: No tenderness or edema.   Neurological: She is alert and oriented to person, place, and time.   Skin: Skin is warm and dry. She is not diaphoretic.   Psychiatric: Mood and affect normal.   Vitals reviewed.         Laboratory   Recent Labs     03/11/20 0442   WBC 5.7   RBC 4.36   HEMOGLOBIN 12.7   HEMATOCRIT 38.2   PLATELETCT 296     Recent Labs     03/10/20  0857 03/11/20  0442 03/12/20  0508   SODIUM 139 132* 135   POTASSIUM 4.2 3.9 4.1   CHLORIDE 104 101 98   CO2 27 24 20   GLUCOSE 95 92 102*   BUN 8 8 9   CREATININE 0.54 0.56 0.48*      Recent Labs     03/10/20  0857 03/11/20  0442 03/11/20  1151 03/12/20  0508   ALTSGPT  --   --  60* 50   ASTSGOT  --   --  24 14   ALKPHOSPHAT  --   --  112* 118*   TBILIRUBIN  --   --  0.4 0.3   DBILIRUBIN  --   --  0.1  --    GLUCOSE 95 92  --  102*                Microbiology  Results     ** No results found for the last 168 hours. **             Labs reviewed by me and noted above.    Radiology  EC-ECHOCARDIOGRAM COMPLETE W/O CONT  CONCLUSIONS  Left ventricular ejection fraction is visually estimated to be 65%.  Mild tricuspid regurgitation.  Estimated right ventricular systolic pressure is  20 mmHg.  Negative bubble study including Valsalva.    EKG  performed 3/11/20 was personally reviewed and per my interpretation shows sinus tachycardia.    Assessment and Plan    Active Problems:    Complicated migraine POA: Yes    Left-sided weakness POA: Yes    S/P  shunt POA: Yes    Dehydration POA: Yes    Anxiety POA: Yes    Depression POA: Yes    Seizures (HCC) POA: Yes    Sinus tachycardia POA: Unknown  Resolved Problems:    * No resolved hospital problems. *      Discussed case with Dr. Reyes, Neurology, to request consultation for recs alternative Rx, ?increase depakote?  Will hold off additional diagnostic study until Neurology eval.    Ativan appears somewhat effective, at least to allow some rest/sleep.  The allergy to the triptan makes acute treatment of her migraine headache difficult.  H/o of unreliable response to DHE per pt's report.    I ordered a STAT EKG and personally reviewed it.  Sinus tachycardia.  No ischemia sign.    Challenge with 1L NS IVF bolus.    Continue current management with Depakote, Keppra, Risperdal, and Benadryl as these are home medication.    I discussed with the patient that I would try to avoid narcotics as a modality for her headache treatment given that they are likely ineffective and has high addiction potential.    DVT prophylaxis: SCD    CODE STATUS:  FULL CODE    Disposition: Anticipate Home in 1-2 days.    Case was discussed with Primary RN, Charge Nurse, Medical SW, , and Pharmacist on IDTround and Pharmacist in addition to individual(s) mentioned above.    I have performed a physical exam and reviewed and updated ROS as of today, 3/12/2020.  In review of yesterday's note dated, 3/11/2020, there are no changes except as documented above.      Jacek Grossman M.D.

## 2020-03-12 NOTE — CARE PLAN
Problem: Safety  Goal: Will remain free from injury  Outcome: PROGRESSING AS EXPECTED  Note: Bed in lowest position, clutter free environment, call light within reach, hourly rounding      Problem: Knowledge Deficit  Goal: Knowledge of disease process/condition, treatment plan, diagnostic tests, and medications will improve  Outcome: PROGRESSING AS EXPECTED  Note: Educated on medications, verbalizes understanding

## 2020-03-12 NOTE — PROGRESS NOTES
Discussed pain meds with pt.   Discussed allowing the new plan to have some time to work.   Provided comfort measures and provided reassurance. Offered Ice, patient declined. Pt still tear full and agitated.

## 2020-03-12 NOTE — PROGRESS NOTES
Monitor summary: SR 79-94, CO 0.14, QRS 0.08, QT 0.36, with frequent no ectopy, per strip from monitor room.

## 2020-03-12 NOTE — PROGRESS NOTES
Assumed care of pt at 1900. Got report at bedside by day shift nurse. Assessment completed, pt A&Ox 4. Pt has headache pain varying from 7-10/10. Bed in lowest position, bed locked, bed alarm on, call light and possessions in reach.

## 2020-03-13 NOTE — PROGRESS NOTES
Monitor summary: SR 79-92, - 140, NC 0.12, QRS 0.08, QT 0.39, 1 sec pause per strip from monitor room.

## 2020-03-13 NOTE — PROGRESS NOTES
Pt voicing wanting to leave, she wants more benadryl and ativan earlier than prescribed. Paging doctor Rodriguez.   Pt refusing to let the nurses deaccess her port.   Doctor Rodriguez says he will not prescribe any earlier medications. Pt refused Toradol. Pt deaccessed her own port by ripping it out and leaving, she refuses to sign AMA paperwork. Pt left the floor at 2230.

## 2020-03-13 NOTE — PROGRESS NOTES
Patient was standing at the bedside gathering her belongings and telling charge RN, Moncho, that she would be leaving AMA because she wanted more Ativan and Benadryl. Patient also said her RN was not calling the hospitalist to check if she was allowed more meds but charge RN could hear the patient's RN on the phone with the hospitalist asking for another order for Ativan or Benadryl. The hospitalist refused to give any more orders and the patient ripped out her IV from her port despite the RN educating the paitent and offering to deaccess it. Patient also refused to sign AMA paperwork despite education from charge RN.

## 2020-03-13 NOTE — PROGRESS NOTES
Assumed care of pt at 1900. Got report at bedside by day shift nurse. Assessment completed, pt A&Ox 4. Pt has chronic headache pain 7-10/10. Bed in lowest position, bed locked, bed alarm on, call light and possessions in reach.

## 2020-03-20 NOTE — DISCHARGE SUMMARY
"HOSPITAL MEDICINE LEAVING AGAINST ADVICE SUMMARY    DATE OF ADMISSION:  3/8/2020    DATE OF leaving AGAINST MEDICAL ADVICE:  3/12/2020    CHIEF COMPLAINT ON ADMISSION  Headache    CODE STATUS  Prior    Allergies   Allergen Reactions   • Sumatriptan Anaphylaxis     Historical=\"Heart stops.\" Reaction  between 1995 to 1997   • Prochlorperazine Rash and Swelling     Tongue swelling. Reaction as a teen. (compazine)  Tolerated Phenergan on 02/24/15   • Vancomycin Shortness of Breath and Rash     Reaction in 2005.  D/W patient 8/31/15 - tolerated loading dose of vancomycin administered on 8/30/15 with some itching to chest with decreased infusion rate. Red Man Syndrome.  2018-tolerated slow drip with benadryl pre-med-had no problems.       DISCHARGE PROBLEM LIST  Active Problems:    Complicated migraine POA: Yes    Left-sided weakness POA: Yes    S/P  shunt POA: Yes    Dehydration POA: Yes    Anxiety POA: Yes    Depression POA: Yes    Seizures (HCC) POA: Yes    Sinus tachycardia POA: Unknown  Resolved Problems:    * No resolved hospital problems. *      MEDICATIONS ON DISCHARGE  Medication reconciliation was impossible because the patient left AMA.    The medication list below are medications she was on prior to admission.     Medication List      ASK your doctor about these medications      Instructions   aspirin 81 MG Chew chewable tablet  Commonly known as:  ASA   Take 81 mg by mouth every bedtime.  Dose:  81 mg     diphenhydrAMINE 50 MG/ML Soln  Commonly known as:  BENADRYL   50 mg by Intramuscular route 4 times a day as needed (for migraines).  Dose:  50 mg     divalproex 250 MG Tbec  Commonly known as:  DEPAKOTE  Ask about: Which instructions should I use?   Take 250-500 mg by mouth 2 Times a Day. 250 mg in the morning, 250 mg at 1200, and 500 mg at bedtime  Dose:  250-500 mg     Emgality 120 MG/ML Soaj  Generic drug:  Galcanezumab-gnlm   Inject 120 mg as instructed Q30 DAYS.  Dose:  120 mg     ketorolac 60 MG/2ML " "Soln  Commonly known as:  TORADOL   60 mg by Intramuscular route 2 times a day as needed (headache).  Dose:  60 mg     * levETIRAcetam 500 MG Tabs  Commonly known as:  KEPPRA   Take 500 mg by mouth 2 times a day.  Dose:  500 mg     * levetiracetam 750 MG tablet  Commonly known as:  KEPPRA   Take 1 Tab by mouth 2 Times a Day.  Dose:  750 mg     risperiDONE 2 MG Tabs  Commonly known as:  RISPERDAL   Take 2 mg by mouth 2 times a day.  Dose:  2 mg         * This list has 2 medication(s) that are the same as other medications prescribed for you. Read the directions carefully, and ask your doctor or other care provider to review them with you.                CONSULTATIONS  Neurology    HPI & HOSPITAL COURSE  49 y.o. female admitted 3/8/2020 with headache and left-sided weakness, the latter has now resolved.  Etiology of the left-sided transient weakness most likely related to her migraine headache, localized to the right side.  Stroke work-up on initial presentation including CT angios of the head and neck were negative.  Unfortunately, the presence of a spine stimulator is not compatible with brain MRI per our Radiology department.    On hospital day #4, the patient has requested a new attending.  The reason being that she requested to be placed on a ketamine drip for headache and doctor Mechelle felt that this was not indicated.  I was asked to see the patient starting 03/11/2020.  In speaking to the patient, headache is persistent, right posterior right orbital region, pounding in nature with some sharp spiking.  Associated with photophobia.  She is allergic to the triptans, the reaction to triptan apparently \"heart stop,\" where she had to be shocked back into NSR.  Also report moderate success with DHE treatment in the past.    Patient feels that the most effective prophylactic has been Depakote, but at a higher level, which would decrease due to recommendations of neurology that the patient seen during last admission for " the same presentation.  At home, the patient has prescription for IM Toradol and IM Benadryl.  He has an appointment with a neurologist in [facility] sometime in the near future.  She does not recall the exact date.    Neurology consultation note by Dr. Juárez for a previous admission, on 02/23/2020, was reviewed personally.  Neurology at the time, recommend to increase her Depakote to 5 mg p.o. 3 times daily, Keppra 750 mg p.o. twice daily, and continue Toradol as needed.  Patient was also recommended to see a migraine specialist in the outpatient setting.  Apparently, neurology also recommended to her primary care physician to decrease her medication outpatient for concern of polypharmacy as an etiology of her headache.    I consult Dr. Reyes after trying benzodiazepine has both an abortive and supportive treatment.  Dr. Pierre prescribed Benadryl, Reglan and a dose of magnesium, then signed off the case on the same day.  Unfortunately, these therapies was ineffective.  In my discussion with the patient, we discussed the role benzodiazepine as a trial for both potential abortive as well as supportive measure to help her get some rest, which contributed to alleviate some of the intensity of her headaches.  Patient agreed with the benzodiazepine regiment and promised not to request additional intravenous dosage.  I also discussed strategy of avoiding using narcotics for treatment given that they are likely ineffective and has a tremendous high addiction potential.    Per report from her bedside RN, the patient wanted additional dosage of intravenous Ativan with increased frequency that evening.  Her nurse paged the [facility] Hospitalist, who declined to prescribe additional intravenous Ativan.  Per report, because of this, the patient left AGAINST MEDICAL ADVICE.  I was not present at the time of her leaving [facility].    SPECIFIC OUTPATIENT FOLLOW-UP RECOMMENDATIONS  None as patient left AGAINST MEDICAL  ADVICE.    FOLLOW UP  Elliott Power M.D.  [address] [facility] [address]  [phone]    In 1 week      Total time of the discharge process exceeds 38 minutes.      Jacek Grossman M.D.

## 2020-11-29 ENCOUNTER — HOSPITAL ENCOUNTER (OUTPATIENT)
Facility: MEDICAL CENTER | Age: 50
End: 2020-11-29
Payer: COMMERCIAL

## 2020-11-30 LAB
COVID ORDER STATUS COVID19: NORMAL
SARS-COV-2 RNA RESP QL NAA+PROBE: NOTDETECTED
SPECIMEN SOURCE: NORMAL

## 2021-03-23 ENCOUNTER — HOSPITAL ENCOUNTER (EMERGENCY)
Facility: MEDICAL CENTER | Age: 51
End: 2021-03-23
Attending: EMERGENCY MEDICINE
Payer: MEDICARE

## 2021-03-23 VITALS
HEART RATE: 100 BPM | BODY MASS INDEX: 28.88 KG/M2 | HEIGHT: 63 IN | TEMPERATURE: 98.7 F | WEIGHT: 163 LBS | DIASTOLIC BLOOD PRESSURE: 83 MMHG | OXYGEN SATURATION: 96 % | SYSTOLIC BLOOD PRESSURE: 131 MMHG | RESPIRATION RATE: 18 BRPM

## 2021-03-23 DIAGNOSIS — G43.001 MIGRAINE WITHOUT AURA AND WITH STATUS MIGRAINOSUS, NOT INTRACTABLE: ICD-10-CM

## 2021-03-23 PROCEDURE — 700105 HCHG RX REV CODE 258: Performed by: EMERGENCY MEDICINE

## 2021-03-23 PROCEDURE — 99284 EMERGENCY DEPT VISIT MOD MDM: CPT

## 2021-03-23 PROCEDURE — 96365 THER/PROPH/DIAG IV INF INIT: CPT

## 2021-03-23 PROCEDURE — 96372 THER/PROPH/DIAG INJ SC/IM: CPT | Mod: XU

## 2021-03-23 PROCEDURE — 700111 HCHG RX REV CODE 636 W/ 250 OVERRIDE (IP): Performed by: EMERGENCY MEDICINE

## 2021-03-23 PROCEDURE — 700101 HCHG RX REV CODE 250: Performed by: EMERGENCY MEDICINE

## 2021-03-23 RX ORDER — HYDROMORPHONE HYDROCHLORIDE 1 MG/ML
1 INJECTION, SOLUTION INTRAMUSCULAR; INTRAVENOUS; SUBCUTANEOUS ONCE
Status: COMPLETED | OUTPATIENT
Start: 2021-03-23 | End: 2021-03-23

## 2021-03-23 RX ORDER — SODIUM CHLORIDE 9 MG/ML
1000 INJECTION, SOLUTION INTRAVENOUS ONCE
Status: COMPLETED | OUTPATIENT
Start: 2021-03-23 | End: 2021-03-23

## 2021-03-23 RX ADMIN — KETAMINE HYDROCHLORIDE 25 MG: 10 INJECTION, SOLUTION INTRAMUSCULAR; INTRAVENOUS at 21:34

## 2021-03-23 RX ADMIN — SODIUM CHLORIDE 1000 ML: 9 INJECTION, SOLUTION INTRAVENOUS at 21:20

## 2021-03-23 RX ADMIN — HYDROMORPHONE HYDROCHLORIDE 1 MG: 1 INJECTION, SOLUTION INTRAMUSCULAR; INTRAVENOUS; SUBCUTANEOUS at 22:00

## 2021-03-23 ASSESSMENT — FIBROSIS 4 INDEX: FIB4 SCORE: 5.26

## 2021-03-23 ASSESSMENT — PAIN DESCRIPTION - PAIN TYPE: TYPE: ACUTE PAIN

## 2021-03-24 NOTE — ED NOTES
"Pt discharged home w/  as ride, pt A&Ox4, steady gait. Educated on f/u appointment and worsening symptoms, pt verbalized understanding.  pt in possession of belongings. Pt provided discharge education and information pertaining to medications and follow up appointments. Pt received copy of discharge instructions and verbalized understanding. /83   Pulse 100   Temp 37.1 °C (98.7 °F) (Oral)   Resp 18   Ht 1.6 m (5' 3\")   Wt 73.9 kg (163 lb)   SpO2 96%   BMI 28.87 kg/m²   "

## 2021-03-24 NOTE — ED TRIAGE NOTES
Chief Complaint   Patient presents with   • Headache     Pt walked into triage with a steady gait c/o HA, hx of migraines, pt took IM Toradol and IM Benadryl prior to arrival    Pt & staff masked and in appropriate PPE during encounter.  Pt denies fever/travel or being in contact with anyone testing positive for Covid.  Explained pt the triage process, made pt aware to tell the RN/staff of any changes/concerns, pt verbalized understanding of process and instructions given.  Pt to ER sera.

## 2021-03-24 NOTE — ED PROVIDER NOTES
ED Provider Note    CHIEF COMPLAINT  Chief Complaint   Patient presents with   • Headache       HPI  Enedelia Pang is a 50 y.o. female who presents to the emergency department for acute on chronic headache. Past medical history is documented below. Patient has chronic migraine headaches. Currently awaiting new neurology appointment. Not currently seeing pain management although she has in the past. Due to recurrent migraine headache which was worse than typical she comes to the ER after failure of her medications including Toradol and Benadryl being used. She has had previously she has had to come to the ER for either narcotic or ketamine for breakthrough pain control which she is hopeful for tonight. No other symptoms. No change, regular headaches.    REVIEW OF SYSTEMS  See HPI for further details. All other systems are negative.     PAST MEDICAL HISTORY   has a past medical history of Allergy, unspecified not elsewhere classified, Anemia (10/05/2017), Anesthesia, Anxiety, Anxiety, generalized, Arthritis, autoimmune, Autoimmune disorder ([facility]), Back pain, Clostridium difficile diarrhea (2014), Depression, Elevated liver enzymes (2017), Fall, H/O Clostridium difficile infection (2014), MRSA infection (2003), Hydrocephalus ([facility]) (2015), Joint pain (09/10/2019), Migraine with aura, with intractable migraine, so stated, without mention of status migrainosus, MRSA (methicillin resistant Staphylococcus aureus) (08/2015), MRSA (methicillin resistant Staphylococcus aureus) (12/2017), MRSA (methicillin resistant Staphylococcus aureus) (2018), Pituitary abnormality ([facility]) (2002), Requires supplemental oxygen, Seizure ([facility]) (started 2017), Staph infection, and Stroke ([facility]) (2007).    SOCIAL HISTORY  Social History     Tobacco Use   • Smoking status: Never Smoker   • Smokeless tobacco: Never Used   Substance and Sexual Activity   • Alcohol use: Not Currently   • Drug use: Never   • Sexual activity: Not on file       SURGICAL  "HISTORY   has a past surgical history that includes tonsillectomy (1985); other neurological surg (7640-5273); irrigation & debridement neuro (N/A, 6/28/2015); lumbar exploration (N/A, 8/31/2015); spinal cord stimulator (12/19/2016);  shunt insertion (5/31/2017); liver biopsy (07/24/2017); cholecystectomy (2001); appendectomy (1982); spinal cord stimulator (05/24/2018); spinal cord stimulator (2003); knee arthroscopy (Right, 1990); full thickness skin graft (Left, 04/2018); other surgical procedure (11/10/2017); other neurological surg (08/2015); endometrial ablation (2001); tubal coagulation laparoscopic bilateral (Bilateral, 2001); implant neurostim/ (9/13/2019); and other (Left, 01/23/2020).    CURRENT MEDICATIONS  Home Medications    **Home medications have not yet been reviewed for this encounter**         ALLERGIES  Allergies   Allergen Reactions   • Sumatriptan Anaphylaxis     Historical=\"Heart stops.\" Reaction  between 1995 to 1997   • Prochlorperazine Rash and Swelling     Tongue swelling. Reaction as a teen. (compazine)  Tolerated Phenergan on 02/24/15   • Vancomycin Shortness of Breath and Rash     Reaction in 2005.  D/W patient 8/31/15 - tolerated loading dose of vancomycin administered on 8/30/15 with some itching to chest with decreased infusion rate. Red Man Syndrome.  2018-tolerated slow drip with benadryl pre-med-had no problems.       PHYSICAL EXAM  VITAL SIGNS: /83   Pulse 100   Temp 36.7 °C (98 °F) (Temporal)   Resp 20   Ht 1.6 m (5' 3\")   Wt 73.9 kg (163 lb)   SpO2 96%   BMI 28.87 kg/m²  @[id number]::@  Pulse ox interpretation: I interpret this pulse ox as normal.  Constitutional: Alert in no apparent distress.  HENT: Normocephalic, Atraumatic, Bilateral external ears normal. Nose normal.   Eyes: Pupils are equal and reactive   Heart: Regular rate and rythm, no murmurs.    Lungs: Clear to auscultation bilaterally.  Skin: Warm, Dry, No erythema, No rash.   Neurologic: " Alert, Grossly non-focal. Cranial nerves two through 12 intact. Slight photophobia  Psychiatric: odd affect, no SI HI          COURSE & MEDICAL DECISION MAKING  Pertinent Labs & Imaging studies reviewed. (See chart for details)  50-year-old female presented emergency room with acute on chronic migraine is headache. History as above. Patient tried medication at home with no relief and therefore came to the ER. Patient was initially given ketamine transfusion to try to avoid narcotics over the patient continues to say that she has had a 10 left-sided headache. For this reason she will be provided with Dilaudid. Additionally patient was provided with a 1 L fluid bolus. Ultimately the patient is now feeling better and will return if needed but will otherwise follow-up primary care physician and ongoing outpatient pain management specialist.    The patient will not drink alcohol nor drive with providedmedications. The patient will return for worsening symptoms and is stable at the time of discharge. The patient verbalizes understanding and will comply.    FINAL IMPRESSION  1. Migraine without aura and with status migrainosus, not intractable               Electronically signed by: Silvio Dillard M.D., 3/23/2021 8:57 PM

## 2021-03-24 NOTE — ED NOTES
"Assist RN:  Pt completed ketamine infusion, pt states that she continues to have pain to L temporal, states it is 8/10 and \"spiking\"   "

## 2021-03-24 NOTE — ED NOTES
Assist RN:   Pt medicated per MAR, pt resting in [facility], turned lights off d/t worsening HA. S/o at bedside, NAD noted. Educated on medication, pt verbalized understanding.

## 2021-03-26 DIAGNOSIS — I87.2 PERIPHERAL VENOUS INSUFFICIENCY: ICD-10-CM

## 2021-03-26 DIAGNOSIS — I77.6 ARTERITIS, UNSPECIFIED (HCC): ICD-10-CM

## 2021-03-26 RX ORDER — HEPARIN SODIUM (PORCINE) LOCK FLUSH IV SOLN 100 UNIT/ML 100 UNIT/ML
300 SOLUTION INTRAVENOUS PRN
Status: CANCELLED | OUTPATIENT
Start: 2021-04-03

## 2021-03-26 RX ORDER — 0.9 % SODIUM CHLORIDE 0.9 %
10 VIAL (ML) INJECTION PRN
Status: CANCELLED | OUTPATIENT
Start: 2021-04-10

## 2021-03-26 RX ORDER — HEPARIN SODIUM (PORCINE) LOCK FLUSH IV SOLN 100 UNIT/ML 100 UNIT/ML
300 SOLUTION INTRAVENOUS PRN
Status: CANCELLED | OUTPATIENT
Start: 2021-04-10

## 2021-03-26 RX ORDER — HEPARIN SODIUM (PORCINE) LOCK FLUSH IV SOLN 100 UNIT/ML 100 UNIT/ML
300 SOLUTION INTRAVENOUS PRN
Status: CANCELLED | OUTPATIENT
Start: 2021-03-27

## 2021-03-26 RX ORDER — 0.9 % SODIUM CHLORIDE 0.9 %
10 VIAL (ML) INJECTION PRN
Status: CANCELLED | OUTPATIENT
Start: 2021-04-03

## 2021-03-26 RX ORDER — 0.9 % SODIUM CHLORIDE 0.9 %
10 VIAL (ML) INJECTION PRN
Status: CANCELLED | OUTPATIENT
Start: 2021-04-17

## 2021-03-26 RX ORDER — HEPARIN SODIUM (PORCINE) LOCK FLUSH IV SOLN 100 UNIT/ML 100 UNIT/ML
300 SOLUTION INTRAVENOUS PRN
Status: CANCELLED | OUTPATIENT
Start: 2021-04-24

## 2021-03-26 RX ORDER — 0.9 % SODIUM CHLORIDE 0.9 %
10 VIAL (ML) INJECTION PRN
Status: CANCELLED | OUTPATIENT
Start: 2021-05-15

## 2021-03-26 RX ORDER — 0.9 % SODIUM CHLORIDE 0.9 %
10 VIAL (ML) INJECTION PRN
Status: CANCELLED | OUTPATIENT
Start: 2021-03-27

## 2021-03-26 RX ORDER — HEPARIN SODIUM (PORCINE) LOCK FLUSH IV SOLN 100 UNIT/ML 100 UNIT/ML
300 SOLUTION INTRAVENOUS PRN
Status: CANCELLED | OUTPATIENT
Start: 2021-04-17

## 2021-03-26 RX ORDER — HEPARIN SODIUM (PORCINE) LOCK FLUSH IV SOLN 100 UNIT/ML 100 UNIT/ML
300 SOLUTION INTRAVENOUS PRN
Status: CANCELLED | OUTPATIENT
Start: 2021-05-08

## 2021-03-26 RX ORDER — HEPARIN SODIUM (PORCINE) LOCK FLUSH IV SOLN 100 UNIT/ML 100 UNIT/ML
300 SOLUTION INTRAVENOUS PRN
Status: CANCELLED | OUTPATIENT
Start: 2021-05-15

## 2021-03-26 RX ORDER — 0.9 % SODIUM CHLORIDE 0.9 %
10 VIAL (ML) INJECTION PRN
Status: CANCELLED | OUTPATIENT
Start: 2021-05-08

## 2021-03-26 RX ORDER — 0.9 % SODIUM CHLORIDE 0.9 %
10 VIAL (ML) INJECTION PRN
Status: CANCELLED | OUTPATIENT
Start: 2021-04-24

## 2021-03-26 RX ORDER — HEPARIN SODIUM (PORCINE) LOCK FLUSH IV SOLN 100 UNIT/ML 100 UNIT/ML
300 SOLUTION INTRAVENOUS PRN
Status: CANCELLED | OUTPATIENT
Start: 2021-05-01

## 2021-03-26 RX ORDER — 0.9 % SODIUM CHLORIDE 0.9 %
10 VIAL (ML) INJECTION PRN
Status: CANCELLED | OUTPATIENT
Start: 2021-05-01

## 2021-04-08 ENCOUNTER — APPOINTMENT (OUTPATIENT)
Dept: RADIOLOGY | Facility: MEDICAL CENTER | Age: 51
End: 2021-04-08
Attending: EMERGENCY MEDICINE
Payer: MEDICARE

## 2021-04-08 ENCOUNTER — HOSPITAL ENCOUNTER (OUTPATIENT)
Facility: MEDICAL CENTER | Age: 51
End: 2021-04-09
Attending: EMERGENCY MEDICINE | Admitting: INTERNAL MEDICINE
Payer: MEDICARE

## 2021-04-08 DIAGNOSIS — R29.90 STROKE-LIKE SYMPTOM: ICD-10-CM

## 2021-04-08 DIAGNOSIS — G89.4 CHRONIC PAIN SYNDROME: ICD-10-CM

## 2021-04-08 DIAGNOSIS — G43.109 COMPLICATED MIGRAINE: ICD-10-CM

## 2021-04-08 DIAGNOSIS — Z98.2 S/P VP SHUNT: ICD-10-CM

## 2021-04-08 DIAGNOSIS — R51.9 ACUTE INTRACTABLE HEADACHE, UNSPECIFIED HEADACHE TYPE: ICD-10-CM

## 2021-04-08 DIAGNOSIS — R20.2 PARESTHESIA: ICD-10-CM

## 2021-04-08 DIAGNOSIS — F41.9 ANXIETY: ICD-10-CM

## 2021-04-08 DIAGNOSIS — E87.1 HYPONATREMIA: ICD-10-CM

## 2021-04-08 DIAGNOSIS — E86.0 DEHYDRATION: ICD-10-CM

## 2021-04-08 LAB
ALBUMIN SERPL BCP-MCNC: 3.7 G/DL (ref 3.2–4.9)
ALBUMIN/GLOB SERPL: 1.4 G/DL
ALP SERPL-CCNC: 144 U/L (ref 30–99)
ALT SERPL-CCNC: 56 U/L (ref 2–50)
ANION GAP SERPL CALC-SCNC: 15 MMOL/L (ref 7–16)
ANISOCYTOSIS BLD QL SMEAR: ABNORMAL
APTT PPP: 44.4 SEC (ref 24.7–36)
AST SERPL-CCNC: 39 U/L (ref 12–45)
BASOPHILS # BLD AUTO: 0 % (ref 0–1.8)
BASOPHILS # BLD: 0 K/UL (ref 0–0.12)
BILIRUB SERPL-MCNC: 0.3 MG/DL (ref 0.1–1.5)
BUN SERPL-MCNC: 17 MG/DL (ref 8–22)
CALCIUM SERPL-MCNC: 8.2 MG/DL (ref 8.5–10.5)
CHLORIDE SERPL-SCNC: 87 MMOL/L (ref 96–112)
CO2 SERPL-SCNC: 20 MMOL/L (ref 20–33)
CORTIS SERPL-MCNC: 36.7 UG/DL (ref 0–23)
CREAT SERPL-MCNC: 0.95 MG/DL (ref 0.5–1.4)
EOSINOPHIL # BLD AUTO: 0 K/UL (ref 0–0.51)
EOSINOPHIL NFR BLD: 0 % (ref 0–6.9)
ERYTHROCYTE [DISTWIDTH] IN BLOOD BY AUTOMATED COUNT: 51.4 FL (ref 35.9–50)
GLOBULIN SER CALC-MCNC: 2.7 G/DL (ref 1.9–3.5)
GLUCOSE BLD-MCNC: 75 MG/DL (ref 65–99)
GLUCOSE SERPL-MCNC: 92 MG/DL (ref 65–99)
HCT VFR BLD AUTO: 32.2 % (ref 37–47)
HGB BLD-MCNC: 10.5 G/DL (ref 12–16)
INR PPP: 1.24 (ref 0.87–1.13)
LYMPHOCYTES # BLD AUTO: 0.21 K/UL (ref 1–4.8)
LYMPHOCYTES NFR BLD: 1.7 % (ref 22–41)
MAGNESIUM SERPL-MCNC: 1.4 MG/DL (ref 1.5–2.5)
MANUAL DIFF BLD: ABNORMAL
MCH RBC QN AUTO: 25.5 PG (ref 27–33)
MCHC RBC AUTO-ENTMCNC: 32.6 G/DL (ref 33.6–35)
MCV RBC AUTO: 78.3 FL (ref 81.4–97.8)
METAMYELOCYTES NFR BLD MANUAL: 7 %
MICROCYTES BLD QL SMEAR: ABNORMAL
MONOCYTES # BLD AUTO: 0.43 K/UL (ref 0–0.85)
MONOCYTES NFR BLD AUTO: 3.5 % (ref 0–13.4)
MORPHOLOGY BLD-IMP: NORMAL
NEUTROPHILS # BLD AUTO: 10.8 K/UL (ref 2–7.15)
NEUTROPHILS NFR BLD: 53.9 % (ref 44–72)
NEUTS BAND NFR BLD MANUAL: 33.9 % (ref 0–10)
NRBC # BLD AUTO: 0 K/UL
NRBC BLD-RTO: 0 /100 WBC
OSMOLALITY SERPL: 259 MOSM/KG H2O (ref 278–298)
OSMOLALITY UR: 370 MOSM/KG H2O (ref 300–900)
PHOSPHATE SERPL-MCNC: 3.3 MG/DL (ref 2.5–4.5)
PLATELET # BLD AUTO: 250 K/UL (ref 164–446)
PLATELET BLD QL SMEAR: NORMAL
PMV BLD AUTO: 10.2 FL (ref 9–12.9)
POTASSIUM SERPL-SCNC: 3.3 MMOL/L (ref 3.6–5.5)
PROT SERPL-MCNC: 6.4 G/DL (ref 6–8.2)
PROTHROMBIN TIME: 16 SEC (ref 12–14.6)
RBC # BLD AUTO: 4.11 M/UL (ref 4.2–5.4)
RBC BLD AUTO: PRESENT
SARS-COV-2 RNA RESP QL NAA+PROBE: NOTDETECTED
SODIUM SERPL-SCNC: 122 MMOL/L (ref 135–145)
SODIUM UR-SCNC: <20 MMOL/L
SPECIMEN SOURCE: NORMAL
TROPONIN T SERPL-MCNC: 10 NG/L (ref 6–19)
TSH SERPL DL<=0.005 MIU/L-ACNC: 1.21 UIU/ML (ref 0.38–5.33)
WBC # BLD AUTO: 12.3 K/UL (ref 4.8–10.8)

## 2021-04-08 PROCEDURE — 84443 ASSAY THYROID STIM HORMONE: CPT

## 2021-04-08 PROCEDURE — 85610 PROTHROMBIN TIME: CPT

## 2021-04-08 PROCEDURE — 700111 HCHG RX REV CODE 636 W/ 250 OVERRIDE (IP)

## 2021-04-08 PROCEDURE — G0378 HOSPITAL OBSERVATION PER HR: HCPCS

## 2021-04-08 PROCEDURE — 70496 CT ANGIOGRAPHY HEAD: CPT | Mod: ME

## 2021-04-08 PROCEDURE — 0042T CT-CEREBRAL PERFUSION ANALYSIS: CPT

## 2021-04-08 PROCEDURE — U0005 INFEC AGEN DETEC AMPLI PROBE: HCPCS

## 2021-04-08 PROCEDURE — 85007 BL SMEAR W/DIFF WBC COUNT: CPT

## 2021-04-08 PROCEDURE — 85027 COMPLETE CBC AUTOMATED: CPT

## 2021-04-08 PROCEDURE — 93005 ELECTROCARDIOGRAM TRACING: CPT | Performed by: EMERGENCY MEDICINE

## 2021-04-08 PROCEDURE — 96375 TX/PRO/DX INJ NEW DRUG ADDON: CPT | Mod: XU

## 2021-04-08 PROCEDURE — 700105 HCHG RX REV CODE 258: Performed by: EMERGENCY MEDICINE

## 2021-04-08 PROCEDURE — 84484 ASSAY OF TROPONIN QUANT: CPT

## 2021-04-08 PROCEDURE — 700111 HCHG RX REV CODE 636 W/ 250 OVERRIDE (IP): Performed by: PSYCHIATRY & NEUROLOGY

## 2021-04-08 PROCEDURE — 85730 THROMBOPLASTIN TIME PARTIAL: CPT

## 2021-04-08 PROCEDURE — 700105 HCHG RX REV CODE 258: Performed by: INTERNAL MEDICINE

## 2021-04-08 PROCEDURE — 36415 COLL VENOUS BLD VENIPUNCTURE: CPT

## 2021-04-08 PROCEDURE — 82962 GLUCOSE BLOOD TEST: CPT

## 2021-04-08 PROCEDURE — 96375 TX/PRO/DX INJ NEW DRUG ADDON: CPT

## 2021-04-08 PROCEDURE — 70450 CT HEAD/BRAIN W/O DYE: CPT | Mod: ME

## 2021-04-08 PROCEDURE — U0003 INFECTIOUS AGENT DETECTION BY NUCLEIC ACID (DNA OR RNA); SEVERE ACUTE RESPIRATORY SYNDROME CORONAVIRUS 2 (SARS-COV-2) (CORONAVIRUS DISEASE [COVID-19]), AMPLIFIED PROBE TECHNIQUE, MAKING USE OF HIGH THROUGHPUT TECHNOLOGIES AS DESCRIBED BY CMS-2020-01-R: HCPCS

## 2021-04-08 PROCEDURE — C9803 HOPD COVID-19 SPEC COLLECT: HCPCS | Performed by: EMERGENCY MEDICINE

## 2021-04-08 PROCEDURE — 96365 THER/PROPH/DIAG IV INF INIT: CPT | Mod: XU

## 2021-04-08 PROCEDURE — 70498 CT ANGIOGRAPHY NECK: CPT | Mod: MG

## 2021-04-08 PROCEDURE — 700111 HCHG RX REV CODE 636 W/ 250 OVERRIDE (IP): Performed by: EMERGENCY MEDICINE

## 2021-04-08 PROCEDURE — 700111 HCHG RX REV CODE 636 W/ 250 OVERRIDE (IP): Performed by: NURSE PRACTITIONER

## 2021-04-08 PROCEDURE — 96376 TX/PRO/DX INJ SAME DRUG ADON: CPT

## 2021-04-08 PROCEDURE — 83935 ASSAY OF URINE OSMOLALITY: CPT

## 2021-04-08 PROCEDURE — 80053 COMPREHEN METABOLIC PANEL: CPT

## 2021-04-08 PROCEDURE — 96366 THER/PROPH/DIAG IV INF ADDON: CPT

## 2021-04-08 PROCEDURE — 84300 ASSAY OF URINE SODIUM: CPT

## 2021-04-08 PROCEDURE — 84100 ASSAY OF PHOSPHORUS: CPT

## 2021-04-08 PROCEDURE — 82533 TOTAL CORTISOL: CPT

## 2021-04-08 PROCEDURE — 99285 EMERGENCY DEPT VISIT HI MDM: CPT

## 2021-04-08 PROCEDURE — 700102 HCHG RX REV CODE 250 W/ 637 OVERRIDE(OP): Performed by: NURSE PRACTITIONER

## 2021-04-08 PROCEDURE — 99215 OFFICE O/P EST HI 40 MIN: CPT | Performed by: PSYCHIATRY & NEUROLOGY

## 2021-04-08 PROCEDURE — 71045 X-RAY EXAM CHEST 1 VIEW: CPT

## 2021-04-08 PROCEDURE — 83735 ASSAY OF MAGNESIUM: CPT

## 2021-04-08 PROCEDURE — 99220 PR INITIAL OBSERVATION CARE,LEVL III: CPT | Performed by: INTERNAL MEDICINE

## 2021-04-08 PROCEDURE — 700117 HCHG RX CONTRAST REV CODE 255: Performed by: EMERGENCY MEDICINE

## 2021-04-08 PROCEDURE — 83930 ASSAY OF BLOOD OSMOLALITY: CPT

## 2021-04-08 PROCEDURE — A9270 NON-COVERED ITEM OR SERVICE: HCPCS | Performed by: NURSE PRACTITIONER

## 2021-04-08 PROCEDURE — 700101 HCHG RX REV CODE 250: Performed by: INTERNAL MEDICINE

## 2021-04-08 RX ORDER — ONDANSETRON 4 MG/1
4 TABLET, ORALLY DISINTEGRATING ORAL EVERY 4 HOURS PRN
Status: DISCONTINUED | OUTPATIENT
Start: 2021-04-08 | End: 2021-04-09 | Stop reason: HOSPADM

## 2021-04-08 RX ORDER — METOCLOPRAMIDE HYDROCHLORIDE 5 MG/ML
10 INJECTION INTRAMUSCULAR; INTRAVENOUS ONCE
Status: COMPLETED | OUTPATIENT
Start: 2021-04-08 | End: 2021-04-08

## 2021-04-08 RX ORDER — BISACODYL 10 MG
10 SUPPOSITORY, RECTAL RECTAL
Status: DISCONTINUED | OUTPATIENT
Start: 2021-04-08 | End: 2021-04-09 | Stop reason: HOSPADM

## 2021-04-08 RX ORDER — METHYLPREDNISOLONE 4 MG/1
4 TABLET ORAL
Status: DISCONTINUED | OUTPATIENT
Start: 2021-04-12 | End: 2021-04-09 | Stop reason: HOSPADM

## 2021-04-08 RX ORDER — DIPHENHYDRAMINE HYDROCHLORIDE 50 MG/ML
50 INJECTION INTRAMUSCULAR; INTRAVENOUS EVERY 6 HOURS
Status: DISCONTINUED | OUTPATIENT
Start: 2021-04-08 | End: 2021-04-08

## 2021-04-08 RX ORDER — ASPIRIN 81 MG/1
81 TABLET, CHEWABLE ORAL DAILY
Status: DISCONTINUED | OUTPATIENT
Start: 2021-04-09 | End: 2021-04-09 | Stop reason: HOSPADM

## 2021-04-08 RX ORDER — DIPHENHYDRAMINE HYDROCHLORIDE 50 MG/ML
50 INJECTION INTRAMUSCULAR; INTRAVENOUS EVERY 6 HOURS
Status: DISCONTINUED | OUTPATIENT
Start: 2021-04-08 | End: 2021-04-09 | Stop reason: HOSPADM

## 2021-04-08 RX ORDER — OXYCODONE HYDROCHLORIDE 5 MG/1
5 TABLET ORAL
Status: DISCONTINUED | OUTPATIENT
Start: 2021-04-08 | End: 2021-04-09 | Stop reason: HOSPADM

## 2021-04-08 RX ORDER — METOCLOPRAMIDE HYDROCHLORIDE 5 MG/ML
10 INJECTION INTRAMUSCULAR; INTRAVENOUS EVERY 6 HOURS
Status: DISCONTINUED | OUTPATIENT
Start: 2021-04-08 | End: 2021-04-09 | Stop reason: HOSPADM

## 2021-04-08 RX ORDER — PAROXETINE 10 MG/1
10 TABLET, FILM COATED ORAL EVERY MORNING
COMMUNITY
Start: 2021-04-01

## 2021-04-08 RX ORDER — DIVALPROEX SODIUM 250 MG/1
250 TABLET, EXTENDED RELEASE ORAL 3 TIMES DAILY
Status: DISCONTINUED | OUTPATIENT
Start: 2021-04-08 | End: 2021-04-09 | Stop reason: HOSPADM

## 2021-04-08 RX ORDER — DULOXETIN HYDROCHLORIDE 60 MG/1
60 CAPSULE, DELAYED RELEASE ORAL 2 TIMES DAILY
Status: DISCONTINUED | OUTPATIENT
Start: 2021-04-08 | End: 2021-04-09 | Stop reason: HOSPADM

## 2021-04-08 RX ORDER — AMOXICILLIN 250 MG
2 CAPSULE ORAL 2 TIMES DAILY
Status: DISCONTINUED | OUTPATIENT
Start: 2021-04-08 | End: 2021-04-09 | Stop reason: HOSPADM

## 2021-04-08 RX ORDER — METOCLOPRAMIDE HYDROCHLORIDE 5 MG/ML
INJECTION INTRAMUSCULAR; INTRAVENOUS
Status: COMPLETED
Start: 2021-04-08 | End: 2021-04-08

## 2021-04-08 RX ORDER — KETOROLAC TROMETHAMINE 30 MG/ML
60 INJECTION, SOLUTION INTRAMUSCULAR; INTRAVENOUS 2 TIMES DAILY PRN
Status: DISCONTINUED | OUTPATIENT
Start: 2021-04-08 | End: 2021-04-09 | Stop reason: HOSPADM

## 2021-04-08 RX ORDER — DIPHENHYDRAMINE HYDROCHLORIDE 50 MG/ML
50 INJECTION INTRAMUSCULAR; INTRAVENOUS ONCE
Status: COMPLETED | OUTPATIENT
Start: 2021-04-08 | End: 2021-04-08

## 2021-04-08 RX ORDER — MAGNESIUM SULFATE HEPTAHYDRATE 40 MG/ML
2 INJECTION, SOLUTION INTRAVENOUS ONCE
Status: COMPLETED | OUTPATIENT
Start: 2021-04-08 | End: 2021-04-08

## 2021-04-08 RX ORDER — METHYLPREDNISOLONE 4 MG/1
4 TABLET ORAL
Status: DISCONTINUED | OUTPATIENT
Start: 2021-04-11 | End: 2021-04-09 | Stop reason: HOSPADM

## 2021-04-08 RX ORDER — ENALAPRILAT 1.25 MG/ML
1.25 INJECTION INTRAVENOUS EVERY 6 HOURS PRN
Status: DISCONTINUED | OUTPATIENT
Start: 2021-04-08 | End: 2021-04-09 | Stop reason: HOSPADM

## 2021-04-08 RX ORDER — DULOXETIN HYDROCHLORIDE 60 MG/1
60 CAPSULE, DELAYED RELEASE ORAL 2 TIMES DAILY
COMMUNITY
Start: 2021-04-01

## 2021-04-08 RX ORDER — RISPERIDONE 2 MG/1
2 TABLET ORAL 2 TIMES DAILY
Status: DISCONTINUED | OUTPATIENT
Start: 2021-04-08 | End: 2021-04-09 | Stop reason: HOSPADM

## 2021-04-08 RX ORDER — HYDROMORPHONE HYDROCHLORIDE 1 MG/ML
0.25 INJECTION, SOLUTION INTRAMUSCULAR; INTRAVENOUS; SUBCUTANEOUS
Status: DISCONTINUED | OUTPATIENT
Start: 2021-04-08 | End: 2021-04-09 | Stop reason: HOSPADM

## 2021-04-08 RX ORDER — ACETAMINOPHEN 325 MG/1
650 TABLET ORAL EVERY 6 HOURS
Status: DISCONTINUED | OUTPATIENT
Start: 2021-04-08 | End: 2021-04-09 | Stop reason: HOSPADM

## 2021-04-08 RX ORDER — SODIUM CHLORIDE 9 MG/ML
1000 INJECTION, SOLUTION INTRAVENOUS ONCE
Status: COMPLETED | OUTPATIENT
Start: 2021-04-08 | End: 2021-04-08

## 2021-04-08 RX ORDER — METHYLPREDNISOLONE 4 MG/1
8 TABLET ORAL
Status: DISCONTINUED | OUTPATIENT
Start: 2021-04-10 | End: 2021-04-09 | Stop reason: HOSPADM

## 2021-04-08 RX ORDER — POLYETHYLENE GLYCOL 3350 17 G/17G
1 POWDER, FOR SOLUTION ORAL
Status: DISCONTINUED | OUTPATIENT
Start: 2021-04-08 | End: 2021-04-09 | Stop reason: HOSPADM

## 2021-04-08 RX ORDER — DIVALPROEX SODIUM 250 MG/1
500 TABLET, EXTENDED RELEASE ORAL 3 TIMES DAILY
COMMUNITY
Start: 2021-03-17

## 2021-04-08 RX ORDER — PAROXETINE 10 MG/1
10 TABLET, FILM COATED ORAL DAILY
Status: DISCONTINUED | OUTPATIENT
Start: 2021-04-09 | End: 2021-04-09 | Stop reason: HOSPADM

## 2021-04-08 RX ORDER — DIPHENHYDRAMINE HYDROCHLORIDE 50 MG/ML
25 INJECTION INTRAMUSCULAR; INTRAVENOUS ONCE
Status: CANCELLED | OUTPATIENT
Start: 2021-04-08 | End: 2021-04-08

## 2021-04-08 RX ORDER — DIPHENHYDRAMINE HYDROCHLORIDE 50 MG/ML
25 INJECTION INTRAMUSCULAR; INTRAVENOUS ONCE
Status: DISCONTINUED | OUTPATIENT
Start: 2021-04-08 | End: 2021-04-08

## 2021-04-08 RX ORDER — KETOROLAC TROMETHAMINE 30 MG/ML
30 INJECTION, SOLUTION INTRAMUSCULAR; INTRAVENOUS EVERY 6 HOURS PRN
Status: DISCONTINUED | OUTPATIENT
Start: 2021-04-08 | End: 2021-04-09 | Stop reason: HOSPADM

## 2021-04-08 RX ORDER — SODIUM CHLORIDE 9 MG/ML
1000 INJECTION, SOLUTION INTRAVENOUS ONCE
Status: DISCONTINUED | OUTPATIENT
Start: 2021-04-08 | End: 2021-04-08

## 2021-04-08 RX ORDER — FREMANEZUMAB-VFRM 225 MG/1.5ML
225 INJECTION SUBCUTANEOUS
COMMUNITY
End: 2021-04-08

## 2021-04-08 RX ORDER — OXYCODONE HYDROCHLORIDE 5 MG/1
2.5 TABLET ORAL
Status: DISCONTINUED | OUTPATIENT
Start: 2021-04-08 | End: 2021-04-09 | Stop reason: HOSPADM

## 2021-04-08 RX ORDER — METHYLPREDNISOLONE 4 MG/1
4 TABLET ORAL 2 TIMES DAILY WITH MEALS
Status: DISCONTINUED | OUTPATIENT
Start: 2021-04-13 | End: 2021-04-09 | Stop reason: HOSPADM

## 2021-04-08 RX ORDER — METOCLOPRAMIDE HYDROCHLORIDE 5 MG/ML
10 INJECTION INTRAMUSCULAR; INTRAVENOUS ONCE
Status: CANCELLED | OUTPATIENT
Start: 2021-04-08 | End: 2021-04-08

## 2021-04-08 RX ORDER — METHYLPREDNISOLONE 4 MG/1
4 TABLET ORAL
Status: DISCONTINUED | OUTPATIENT
Start: 2021-04-10 | End: 2021-04-09 | Stop reason: HOSPADM

## 2021-04-08 RX ORDER — ERENUMAB-AOOE 70 MG/ML
70 INJECTION SUBCUTANEOUS
COMMUNITY
Start: 2021-03-04

## 2021-04-08 RX ORDER — MAGNESIUM SULFATE HEPTAHYDRATE 40 MG/ML
2 INJECTION, SOLUTION INTRAVENOUS ONCE
Status: CANCELLED | OUTPATIENT
Start: 2021-04-08 | End: 2021-04-08

## 2021-04-08 RX ORDER — ACETAMINOPHEN 325 MG/1
650 TABLET ORAL EVERY 6 HOURS PRN
Status: DISCONTINUED | OUTPATIENT
Start: 2021-04-08 | End: 2021-04-09 | Stop reason: HOSPADM

## 2021-04-08 RX ORDER — DIPHENHYDRAMINE HYDROCHLORIDE 50 MG/ML
INJECTION INTRAMUSCULAR; INTRAVENOUS
Status: COMPLETED
Start: 2021-04-08 | End: 2021-04-08

## 2021-04-08 RX ORDER — ONDANSETRON 2 MG/ML
4 INJECTION INTRAMUSCULAR; INTRAVENOUS EVERY 4 HOURS PRN
Status: DISCONTINUED | OUTPATIENT
Start: 2021-04-08 | End: 2021-04-09 | Stop reason: HOSPADM

## 2021-04-08 RX ORDER — DEXAMETHASONE SODIUM PHOSPHATE 10 MG/ML
10 INJECTION, SOLUTION INTRAMUSCULAR; INTRAVENOUS ONCE
Status: COMPLETED | OUTPATIENT
Start: 2021-04-08 | End: 2021-04-08

## 2021-04-08 RX ORDER — LORAZEPAM 2 MG/ML
2 INJECTION INTRAMUSCULAR ONCE
Status: COMPLETED | OUTPATIENT
Start: 2021-04-08 | End: 2021-04-08

## 2021-04-08 RX ADMIN — SODIUM CHLORIDE 1000 ML: 9 INJECTION, SOLUTION INTRAVENOUS at 14:28

## 2021-04-08 RX ADMIN — IOHEXOL 40 ML: 350 INJECTION, SOLUTION INTRAVENOUS at 13:53

## 2021-04-08 RX ADMIN — DIPHENHYDRAMINE HYDROCHLORIDE 50 MG: 50 INJECTION INTRAMUSCULAR; INTRAVENOUS at 23:58

## 2021-04-08 RX ADMIN — OXYCODONE 5 MG: 5 TABLET ORAL at 23:58

## 2021-04-08 RX ADMIN — RISPERIDONE 2 MG: 2 TABLET ORAL at 19:34

## 2021-04-08 RX ADMIN — ACETAMINOPHEN 650 MG: 325 TABLET, FILM COATED ORAL at 23:58

## 2021-04-08 RX ADMIN — DIPHENHYDRAMINE HYDROCHLORIDE 50 MG: 50 INJECTION INTRAMUSCULAR; INTRAVENOUS at 13:20

## 2021-04-08 RX ADMIN — KETAMINE HYDROCHLORIDE 25 MG: 10 INJECTION, SOLUTION INTRAMUSCULAR; INTRAVENOUS at 18:16

## 2021-04-08 RX ADMIN — OXYCODONE 5 MG: 5 TABLET ORAL at 19:33

## 2021-04-08 RX ADMIN — DULOXETINE HYDROCHLORIDE 60 MG: 60 CAPSULE, DELAYED RELEASE ORAL at 19:34

## 2021-04-08 RX ADMIN — METOCLOPRAMIDE 10 MG: 5 INJECTION, SOLUTION INTRAMUSCULAR; INTRAVENOUS at 13:21

## 2021-04-08 RX ADMIN — MAGNESIUM SULFATE 2 G: 2 INJECTION INTRAVENOUS at 19:23

## 2021-04-08 RX ADMIN — IOHEXOL 100 ML: 350 INJECTION, SOLUTION INTRAVENOUS at 13:51

## 2021-04-08 RX ADMIN — ACETAMINOPHEN 650 MG: 325 TABLET, FILM COATED ORAL at 19:33

## 2021-04-08 RX ADMIN — DEXAMETHASONE SODIUM PHOSPHATE 10 MG: 10 INJECTION INTRAMUSCULAR; INTRAVENOUS at 17:41

## 2021-04-08 RX ADMIN — DIVALPROEX SODIUM 250 MG: 250 TABLET, EXTENDED RELEASE ORAL at 19:34

## 2021-04-08 RX ADMIN — MAGNESIUM SULFATE 2 G: 2 INJECTION INTRAVENOUS at 14:27

## 2021-04-08 RX ADMIN — METOCLOPRAMIDE 10 MG: 5 INJECTION, SOLUTION INTRAMUSCULAR; INTRAVENOUS at 19:34

## 2021-04-08 RX ADMIN — METOCLOPRAMIDE HYDROCHLORIDE 10 MG: 5 INJECTION INTRAMUSCULAR; INTRAVENOUS at 13:21

## 2021-04-08 RX ADMIN — DIPHENHYDRAMINE HYDROCHLORIDE 50 MG: 50 INJECTION INTRAMUSCULAR; INTRAVENOUS at 19:34

## 2021-04-08 RX ADMIN — LORAZEPAM 2 MG: 2 INJECTION INTRAMUSCULAR; INTRAVENOUS at 13:34

## 2021-04-08 RX ADMIN — METOCLOPRAMIDE 10 MG: 5 INJECTION, SOLUTION INTRAMUSCULAR; INTRAVENOUS at 23:58

## 2021-04-08 ASSESSMENT — ENCOUNTER SYMPTOMS
NAUSEA: 1
FEVER: 0
PHOTOPHOBIA: 1
SHORTNESS OF BREATH: 0
HEADACHES: 1
DIZZINESS: 1
COUGH: 0
NERVOUS/ANXIOUS: 1
EYE DISCHARGE: 0
SORE THROAT: 0
BACK PAIN: 1
EYE PAIN: 1
ABDOMINAL PAIN: 0
WEAKNESS: 1
DIARRHEA: 0
MYALGIAS: 1
VOMITING: 0
CHILLS: 0
TREMORS: 1

## 2021-04-08 ASSESSMENT — LIFESTYLE VARIABLES
CONSUMPTION TOTAL: NEGATIVE
TOTAL SCORE: 0
EVER FELT BAD OR GUILTY ABOUT YOUR DRINKING: NO
HAVE PEOPLE ANNOYED YOU BY CRITICIZING YOUR DRINKING: NO
TOTAL SCORE: 0
EVER HAD A DRINK FIRST THING IN THE MORNING TO STEADY YOUR NERVES TO GET RID OF A HANGOVER: NO
ON A TYPICAL DAY WHEN YOU DRINK ALCOHOL HOW MANY DRINKS DO YOU HAVE: 0
TOTAL SCORE: 0
ALCOHOL_USE: NO
HOW MANY TIMES IN THE PAST YEAR HAVE YOU HAD 5 OR MORE DRINKS IN A DAY: 0
AVERAGE NUMBER OF DAYS PER WEEK YOU HAVE A DRINK CONTAINING ALCOHOL: 0
HAVE YOU EVER FELT YOU SHOULD CUT DOWN ON YOUR DRINKING: NO

## 2021-04-08 ASSESSMENT — PAIN DESCRIPTION - PAIN TYPE: TYPE: ACUTE PAIN;CHRONIC PAIN

## 2021-04-08 ASSESSMENT — FIBROSIS 4 INDEX
FIB4 SCORE: 5.26
FIB4 SCORE: 1

## 2021-04-08 NOTE — ASSESSMENT & PLAN NOTE
1 L bolus already administered in the ER  Trend BMP. Check TSH and cortisol. Send for Posm, Uosm, Stephon

## 2021-04-08 NOTE — ASSESSMENT & PLAN NOTE
Migraine cocktail in the ER  Followed by IV Decadron 1 today and 1 L saline bolus  Scheduled IV Reglan and diphenhydramine  Continue Depakote  Steroid taper starting 4/9/2021  Possible Ketamine infusion  Follow up with Neurology headache clinic   Neurology consulted

## 2021-04-08 NOTE — ED NOTES
Break RN: Covid swab collected and sent. Pt ambulated to the bathroom with a steady gait. CDU called and notified pt is ready for transfer

## 2021-04-08 NOTE — ASSESSMENT & PLAN NOTE
Previous CVA with similar side affected  MRI unable to be completed in presence of spinal stimulator

## 2021-04-08 NOTE — CONSULTS
"Neurology STROKE CODE H&P  Neurohospitalist Service, [facility]    Referring Physician: Julian Muhammad M.D.    HPI: Enedelia Pang is a 50 y.o. woman with a history of anemia, anxiety, autoimmune disorder NOS, depression, and severe migraines presenting for whom neurology has been consulted for right sided weakness of the arm with associated numbness, sparing the face, in the setting of severe headache.  The patient was last seen at [facility] ED in March 2021 for similar presentation, during which time it was documented, she preseted \"for acute on chronic headache... Patient has chronic migraine headaches. Currently awaiting new neurology appointment. Not currently seeing pain management although she has in the past. Due to recurrent migraine headache which was worse than typical she comes to the ER after failure of her medications including Toradol and Benadryl being used. She has had previously she has had to come to the ER for either narcotic or ketamine for breakthrough pain control which she is hopeful for tonight.\"  Today, the patient notes she has had a headache for three days, but the weakness affecting her right arm started today around 12p 4/8/21.  The patient admits severe 10/10 throbbing frontal headache with associate nausea and photophobia.  The patient denies acute changes in vision or speech.  No vertigo.  This is not the patient's first complicated migraine and she has had similar episode(s) in the past.    Review of systems: In addition to what is detailed in the HPI above, all other systems reviewed and are negative.    Past Medical History:    has a past medical history of Allergy, unspecified not elsewhere classified, Anemia (10/05/2017), Anesthesia, Anxiety, Anxiety, generalized, Arthritis, autoimmune, Autoimmune disorder (HCC), Back pain, Clostridium difficile diarrhea (2014), Depression, Elevated liver enzymes (2017), Fall, H/O Clostridium difficile infection (2014), " "MRSA infection (2003), Hydrocephalus ([facility]) (2015), Joint pain (09/10/2019), Migraine with aura, with intractable migraine, so stated, without mention of status migrainosus, MRSA (methicillin resistant Staphylococcus aureus) (08/2015), MRSA (methicillin resistant Staphylococcus aureus) (12/2017), MRSA (methicillin resistant Staphylococcus aureus) (2018), Pituitary abnormality ([facility]) (2002), Requires supplemental oxygen, Seizure ([facility]) (started 2017), Staph infection, and Stroke (Formerly [facility]) (2007). She also has no past medical history of Addisons disease (Formerly [facility]) or Anginal syndrome (Formerly [facility]).    FHx:  family history includes Alcohol abuse in her father; Diabetes in her father; Mult Sclerosis in her mother.    SHx:   reports that she has never smoked. She has never used smokeless tobacco. She reports previous alcohol use. She reports that she does not use drugs.    Allergies:  Allergies   Allergen Reactions   • Sumatriptan Anaphylaxis     Historical=\"Heart stops.\" Reaction  between 1995 to 1997   • Prochlorperazine Rash and Swelling     Tongue swelling. Reaction as a teen. (compazine)  Tolerated Phenergan on 02/24/15   • Vancomycin Shortness of Breath and Rash     Reaction in 2005.  D/W patient 8/31/15 - tolerated loading dose of vancomycin administered on 8/30/15 with some itching to chest with decreased infusion rate. Red Man Syndrome.  2018-tolerated slow drip with benadryl pre-med-had no problems.       Medications:    Current Facility-Administered Medications:   •  NS infusion 1,000 mL, 1,000 mL, Intravenous, Once, Julian Muhammad M.D.  •  metoclopramide (REGLAN) injection 10 mg, 10 mg, Intravenous, Once, Julian Muhammad M.D.  •  diphenhydrAMINE (BENADRYL) injection 50 mg, 50 mg, Intravenous, Once, Eamon Reyes M.D.  •  magnesium sulfate IVPB premix 2 g, 2 g, Intravenous, Once, Eamon Reyes M.D.  •  LORazepam (ATIVAN) injection 2 mg, 2 mg, Intravenous, Once, Eamon Reyes M.D.    Current Outpatient Medications: " "  •  levETIRAcetam (KEPPRA) 500 MG Tab, Take 500 mg by mouth 2 times a day., Disp: , Rfl:   •  levETIRAcetam (KEPPRA) 750 MG tablet, Take 1 Tab by mouth 2 Times a Day., Disp: 60 Tab, Rfl: 0  •  ketorolac (TORADOL) 60 MG/2ML Solution, 60 mg by Intramuscular route 2 times a day as needed (headache)., Disp: , Rfl:   •  diphenhydrAMINE (BENADRYL) 50 MG/ML Solution, 50 mg by Intramuscular route 4 times a day as needed (for migraines)., Disp: , Rfl:   •  divalproex (DEPAKOTE) 250 MG Tablet Delayed Response, Take 250-500 mg by mouth 2 Times a Day. 250 mg in the morning, 250 mg at 1200, and 500 mg at bedtime, Disp: , Rfl:   •  EMGALITY 120 MG/ML Solution Auto-injector, Inject 120 mg as instructed Q30 DAYS., Disp: , Rfl:   •  risperiDONE (RISPERDAL) 2 MG Tab, Take 2 mg by mouth 2 times a day., Disp: , Rfl:   •  aspirin (ASA) 81 MG Chew Tab chewable tablet, Take 81 mg by mouth every bedtime., Disp: , Rfl:     Physical Examination:    Vitals:    04/08/21 1307 04/08/21 1324   BP: 118/82    Pulse: (!) 103    Resp: 16    Temp: 36.7 °C (98.1 °F)    TempSrc: Temporal    SpO2: 100%    Weight:  74 kg (163 lb 2.3 oz)   Height: 1.6 m (5' 3\")        General: Patient is awake and anxious, tearful  Eyes: examination of optic disks not indicated at this time given acuity of consult  CV: RRR    NEUROLOGICAL EXAM:     Mental status: Awake, alert and fully oriented, follows simple commands  Speech and language: speech is not dysarthric. The patient is able to name and repeat.  Cranial nerve exam: Pupils are equal, round and reactive to light bilaterally. Visual fields are full. Extraocular muscles are intact. Sensation in the face is intact to light touch. Face is symmetric. Hearing to finger rub equal. Palate elevates symmetrically. Shoulder shrug is full. Tongue is midline.  Motor exam: Strength is 4/5 RUE with pronator drift and orbiting. Tone is normal. Low amplitude high frequency tremor of distal RUE  Sensory exam: decrease in sensation " to the right arm 40% compared to left and 50% in the right leg compared to left   Deep tendon reflexes: 2+ and symmetric. Toes down-going bilaterally.  Coordination: no ataxia   Gait: deferred given patient preference    NIH Stroke Scale:    1a. Level of Consciousness (Alert, drowsy, etc): 0= Alert    1b. LOC Questions (Month, age): 0= Answers both correctly    1c. LOC Commands (Open/close eyes make fist/let go): 0= Obeys both correctly    2.   Best Gaze (Eyes open - patient follows examiner's finger on face): 0= Normal    3.   Visual Fields (introduce visual stimulus/threat to patient's field quadrants): 0= No visual loss  4.   Facial Paresis (Show teeth, raise eyebrows and squeeze eyes shut): 0= Normal     5a. Motor Arm - Left (Elevate arm to 90 degrees if patient is sitting, 45 degrees if  supine): 0= No drift    5b. Motor Arm - Right (Elevate arm to 90 degrees if patient is sitting, 45 degrees if supine): 1= Drift    6a. Motor Leg - Left (Elevate leg 30 degrees with patient supine): 0= No drift    6b. Motor Leg - Right  (Elevate leg 30 degrees with patient supine): 0= No drift    7.   Limb Ataxia (Finger-nose, heel down shin): 0= No ataxia    8.   Sensory (Pin prick to face, arm, trunk and leg - compare side to side): 1= Partial loss    9.  Best Language (Name item, describe a picture and read sentences): 0= No aphasia    10. Dysarthria (Evaluate speech clarity by patient repeating listed words): 0= Normal articulation    11. Extinction and Inattention (Use information from prior testing to identify neglect or  double simultaneous stimuli testing): 0= No neglect    Total NIH Score: 2    Modified [place] Scale (MRS): 1 = No significant disability, despite symptoms; able to perform all usual duties and activities    Objective Data:    Labs:  Lab Results   Component Value Date/Time    PROTHROMBTM 13.3 03/08/2020 02:56 PM    INR 0.99 03/08/2020 02:56 PM      Lab Results   Component Value Date/Time    WBC 5.7  03/11/2020 04:42 AM    RBC 4.36 03/11/2020 04:42 AM    HEMOGLOBIN 12.7 03/11/2020 04:42 AM    HEMATOCRIT 38.2 03/11/2020 04:42 AM    MCV 87.6 03/11/2020 04:42 AM    MCH 29.1 03/11/2020 04:42 AM    MCHC 33.2 (L) 03/11/2020 04:42 AM    MPV 9.8 03/11/2020 04:42 AM    NEUTSPOLYS #FORCED 03/09/2020 05:01 AM    LYMPHOCYTES #FORCED 03/09/2020 05:01 AM    MONOCYTES #FORCED 03/09/2020 05:01 AM    EOSINOPHILS #FORCED 03/09/2020 05:01 AM    BASOPHILS #FORCED 03/09/2020 05:01 AM    HYPOCHROMIA 1+ 01/11/2017 02:29 PM    ANISOCYTOSIS 1+ 01/11/2017 02:29 PM      Lab Results   Component Value Date/Time    SODIUM 134 (L) 03/12/2020 01:50 PM    POTASSIUM 3.1 (L) 03/12/2020 01:50 PM    CHLORIDE 101 03/12/2020 01:50 PM    CO2 21 03/12/2020 01:50 PM    GLUCOSE 109 (H) 03/12/2020 01:50 PM    BUN 10 03/12/2020 01:50 PM    CREATININE 0.68 03/12/2020 01:50 PM      Lab Results   Component Value Date/Time    CHOLSTRLTOT 148 03/10/2020 08:57 AM    LDL 94 03/10/2020 08:57 AM    HDL 36 (A) 03/10/2020 08:57 AM    TRIGLYCERIDE 88 03/10/2020 08:57 AM       Lab Results   Component Value Date/Time    ALKPHOSPHAT 200 (H) 03/12/2020 01:50 PM    ASTSGOT 653 (H) 03/12/2020 01:50 PM    ALTSGPT 405 (H) 03/12/2020 01:50 PM    TBILIRUBIN 0.8 03/12/2020 01:50 PM        Imaging/Testing:    I interpreted and/or reviewed the patient's neuroimaging    DX-CHEST-PORTABLE (1 VIEW)    (Results Pending)   CT-HEAD W/O    (Results Pending)   CT-CTA HEAD WITH & W/O-POST PROCESS    (Results Pending)   CT-CTA NECK WITH & W/O-POST PROCESSING    (Results Pending)   CT-CEREBRAL PERFUSION ANALYSIS    (Results Pending)       Assessment and Plan:    Enedelia Pang is a 50 y.o. woman with a history of anemia, anxiety, autoimmune disorder NOS, depression, and severe migraines presenting for whom neurology has been consulted for right sided weakness of the arm with associated numbness, sparing the face, in the setting of severe headache. The most likely etiology is a complex  migraine, thus not a candidate for acute stroke intervention.  Note that the weakness spares the face, patient is anxious and tearful, and with concomitant severe headache.  The patient warrants additional rheumatological workup and medical assessment to address the transaminitis.    GWTG: note that the door to CT time was delayed as there was difficulty in obtaining IV access requiring ultrasound guidance.  I indicated that the CT without contrast may be delayed as the clinical suspicion for ischemic event is low and patient is not a candidate for tPA.    Plan:    1. ddx complicated migraine with status migrainosis    - headache cocktail x1: 2g IV Magnesium, 10mg IV Reglan, 50mg IV Benadryl  - STAT CT, CTA, and CTP  - given duration > 48 hr, can consider prescribing a Medrol Dose pack or prednisone taper to abort status migrainous  --> prednisone 10mg tabs, dispense 6 tabs on day 1 and decrease by 1 tab each day, disp #21   - preventative medication options include beta blocker, Elavil, or Topamax; can be discussed in non-acute setting  - referral placed for headache clinic  - behavioral modifications for migraine prevention:        -sleep 8hrs regularly at night       -eat 3 meals a day and do not skip meals       -drink 1-2 Liters of water a day       -light exercise       -headache diary        -trigger avoidance    2. Right sided tremor, ddx asterixis given elevated liver enzymes vs. Enhanced physiologic tremor secondary to anxiety    - 2mg IV Ativan x1 now for anxiety  - primary team to address elevated liver enzymes    The evaluation of the patient, and recommended management, was discussed with Dr. Julian Muhammad.    Eamon Reyes MD  Neurohospitalist, Acute Care Services   of Neurology    CRITICAL CARE    Upon my evaluation, this patient had a high probability of imminent or life-threatening deterioration due to cerebrovascular accident which required my direct attention,  intervention, and personal management.  I personally provided 40 minutes of total critical care time outside of time spent on separately billable/documented procedures. Time includes: review of laboratory data, review of radiology studies, discussion with consultants, discussion with family/patient, determining candidacy for thrombolytics and/or IR intervention, and monitoring for potential decompensation.  Interventions were performed as documented in detail in the chart.

## 2021-04-08 NOTE — ED NOTES
Med Rec completed per patient, family and home pharmacy   Allergies reviewed  No ORAL antibiotics in last 14 days

## 2021-04-08 NOTE — ED PROVIDER NOTES
ED Provider Note    CHIEF COMPLAINT  Chief Complaint   Patient presents with   • Possible Stroke     onset right side weakness and tingling 1200   • Migraine     x 3 days       HPI    Primary care provider: Elliott Power M.D.  Means of arrival: POV  History obtained from: Patient and   History limited by: Patient very distressed    Enedelia Pang is a 50 y.o. female who presents with headache for 3 days now with 1 hour of right-sided weakness and paresthesias.  Isolated to right arm and leg.  Headache is severe, constant and steadily worsening since onset.  No facial involvement.  No speech changes.  Patient has a very complex past medical history including a  shunt, autoimmune cerebritis, and very frequently has severe migraines.  She has had complex migraines with strokelike symptoms in the past but also reports that she has had a stroke in the past as well without secondary deficits.  No falls or injuries or trauma.  No aggravating factors noted, no alleviation with home medications.  She is on duloxetine and as needed ketorolac and Benadryl which did not help her pain today she has a severe diffuse sharp and stabbing headache, nonradiating.  Denies chest or abdominal pain she does have nausea no vomiting.  Poor sleep lately and increased stress and anxiety but no thoughts of self-harm.  When she started to develop paresthesias in her right arm and leg she became nervous that she was having another stroke so she came immediately in for emergent evaluation.  Stroke alert protocol activated in triage.    REVIEW OF SYSTEMS  Constitutional: Negative for fever or chills.  Positive for malaise.  HENT: Negative for rhinorrhea or sore throat.    Eyes: Negative for double or blurry or loss of vision.  Respiratory: Negative for cough or shortness of breath.    Cardiovascular: Negative for chest pain or syncope.   Gastrointestinal: Positive for nausea, but negative for vomiting, or abdominal pain.    Genitourinary: Negative for dysuria or flank pain.   Musculoskeletal: Negative for back pain or joint pain.   Skin: Negative for itching or rash.   Neurological: Positive for paresthesias in right arm and leg, had some weakness as well beginning an hour ago in those extremities.  Psychiatric/Behavioral: Positive for depression and anxiety but negative for suicidal ideas.   See HPI for further details. All other systems are negative.     PAST MEDICAL HISTORY   has a past medical history of Allergy, unspecified not elsewhere classified, Anemia (10/05/2017), Anesthesia, Anxiety, Anxiety, generalized, Arthritis, autoimmune, Autoimmune disorder ([facility]), Back pain, Clostridium difficile diarrhea (2014), Depression, Elevated liver enzymes (2017), Fall, H/O Clostridium difficile infection (2014), MRSA infection (2003), Hydrocephalus ([facility]) (2015), Joint pain (09/10/2019), Migraine with aura, with intractable migraine, so stated, without mention of status migrainosus, MRSA (methicillin resistant Staphylococcus aureus) (08/2015), MRSA (methicillin resistant Staphylococcus aureus) (12/2017), MRSA (methicillin resistant Staphylococcus aureus) (2018), Pituitary abnormality ([facility]) (2002), Requires supplemental oxygen, Seizure ([facility]) (started 2017), Staph infection, and Stroke ([facility]) (2007).    PAST FAMILY HISTORY  Family History   Problem Relation Age of Onset   • Mult Sclerosis Mother    • Diabetes Father    • Alcohol abuse Father        SOCIAL HISTORY  Social History     Tobacco Use   • Smoking status: Never Smoker   • Smokeless tobacco: Never Used   Substance and Sexual Activity   • Alcohol use: Not Currently   • Drug use: Never   • Sexual activity: Not on file       SURGICAL HISTORY   has a past surgical history that includes tonsillectomy (1985); other neurological surg ([id number]); irrigation & debridement neuro (N/A, 6/28/2015); lumbar exploration (N/A, 8/31/2015); spinal cord stimulator (12/19/2016);  shunt insertion  "(5/31/2017); liver biopsy (07/24/2017); cholecystectomy (2001); appendectomy (1982); spinal cord stimulator (05/24/2018); spinal cord stimulator (2003); knee arthroscopy (Right, 1990); full thickness skin graft (Left, 04/2018); other surgical procedure (11/10/2017); other neurological surg (08/2015); endometrial ablation (2001); tubal coagulation laparoscopic bilateral (Bilateral, 2001); implant neurostim/ (9/13/2019); and other (Left, 01/23/2020).    CURRENT MEDICATIONS  Home Medications     Reviewed by Matthew Aldrich (Pharmacy Tech) on 04/08/21 at 1418  Med List Status: Complete   Medication Last Dose Status   AIMOVIG 70 MG/ML Solution Auto-injector APRIL 2021 Active   aspirin (ASA) 81 MG Chew Tab chewable tablet 4/7/2021 Active   diphenhydrAMINE (BENADRYL) 50 MG/ML Solution 4/7/2021 Active   divalproex ER (DEPAKOTE ER) 250 MG TABLET SR 24 HR 4/8/2021 Active   DULoxetine (CYMBALTA) 60 MG Cap DR Particles delayed-release capsule 4/7/2021 Active   ketorolac (TORADOL) 60 MG/2ML Solution 4/8/2021 Active   levETIRAcetam (KEPPRA) 750 MG tablet NOT TAKING Active   PARoxetine (PAXIL) 10 MG Tab 4/7/2021 Active   risperiDONE (RISPERDAL) 2 MG Tab 4/7/2021 Active   Ubrogepant (UBRELVY) 100 MG Tab 4/8/2021 Active                ALLERGIES  Allergies   Allergen Reactions   • Sumatriptan Anaphylaxis     Historical=\"Heart stops.\" Reaction  between 1995 to 1997   • Prochlorperazine Rash and Swelling     Tongue swelling. Reaction as a teen. (compazine)  Tolerated Phenergan on 02/24/15   • Vancomycin Shortness of Breath and Rash     Reaction in 2005.  D/W patient 8/31/15 - tolerated loading dose of vancomycin administered on 8/30/15 with some itching to chest with decreased infusion rate. Red Man Syndrome.  2018-tolerated slow drip with benadryl pre-med-had no problems.   • Compazine Swelling       PHYSICAL EXAM  VITAL SIGNS: BP (!) 95/63 Comment: will notify RN  Pulse 66   Temp (!) 35.6 °C (96.1 °F) (Temporal)   Resp " "14   Ht 1.6 m (5' 3\")   Wt 66.8 kg (147 lb 4.3 oz)   SpO2 99%   BMI 26.09 kg/m²    Pulse ox interpretation: On room air, I interpret this pulse ox as normal.  Constitutional: Chronically ill appearing for age sitting up in moderate distress.  HEENT: Normocephalic, atraumatic. Posterior pharynx clear, mucous membranes dry.  Eyes:  EOMI. PERRLA 3-2, injected sclerae.  Visual fields are full.  Neck: Supple, nontender.  Chest/Pulmonary: Slightly diminished to ausculation bilaterally, no wheezes or rhonchi.  Cardiovascular: Regular rate and rhythm, no murmur.   Abdomen: Soft, nontender; no rebound, guarding, or masses.  Back: No CVA or midline tenderness.   Musculoskeletal: No deformity or edema.  Neuro: Clear speech, normal coordination, cranial nerves II-XII grossly intact, no focal asymmetry, 5 out of 5 strength in all extremities, subjective asymmetry and sensation in the right arm and leg.  Psych: Tearful and distressed but cooperative.  Skin: No rashes, warm and dry.  Right chest wall tunneled line clean dry and intact.      DIAGNOSTIC STUDIES / PROCEDURES    LABS & EKG  Results for orders placed or performed during the hospital encounter of 04/08/21   CBC WITH DIFFERENTIAL   Result Value Ref Range    WBC 12.3 (H) 4.8 - 10.8 K/uL    RBC 4.11 (L) 4.20 - 5.40 M/uL    Hemoglobin 10.5 (L) 12.0 - 16.0 g/dL    Hematocrit 32.2 (L) 37.0 - 47.0 %    MCV 78.3 (L) 81.4 - 97.8 fL    MCH 25.5 (L) 27.0 - 33.0 pg    MCHC 32.6 (L) 33.6 - 35.0 g/dL    RDW 51.4 (H) 35.9 - 50.0 fL    Platelet Count 250 164 - 446 K/uL    MPV 10.2 9.0 - 12.9 fL    Neutrophils-Polys 53.90 44.00 - 72.00 %    Lymphocytes 1.70 (L) 22.00 - 41.00 %    Monocytes 3.50 0.00 - 13.40 %    Eosinophils 0.00 0.00 - 6.90 %    Basophils 0.00 0.00 - 1.80 %    Nucleated RBC 0.00 /100 WBC    Neutrophils (Absolute) 10.80 (H) 2.00 - 7.15 K/uL    Lymphs (Absolute) 0.21 (L) 1.00 - 4.80 K/uL    Monos (Absolute) 0.43 0.00 - 0.85 K/uL    Eos (Absolute) 0.00 0.00 - 0.51 K/uL "    Baso (Absolute) 0.00 0.00 - 0.12 K/uL    NRBC (Absolute) 0.00 K/uL    Anisocytosis 1+     Microcytosis 1+    COMP METABOLIC PANEL   Result Value Ref Range    Sodium 122 (L) 135 - 145 mmol/L    Potassium 3.3 (L) 3.6 - 5.5 mmol/L    Chloride 87 (L) 96 - 112 mmol/L    Co2 20 20 - 33 mmol/L    Anion Gap 15.0 7.0 - 16.0    Glucose 92 65 - 99 mg/dL    Bun 17 8 - 22 mg/dL    Creatinine 0.95 0.50 - 1.40 mg/dL    Calcium 8.2 (L) 8.5 - 10.5 mg/dL    AST(SGOT) 39 12 - 45 U/L    ALT(SGPT) 56 (H) 2 - 50 U/L    Alkaline Phosphatase 144 (H) 30 - 99 U/L    Total Bilirubin 0.3 0.1 - 1.5 mg/dL    Albumin 3.7 3.2 - 4.9 g/dL    Total Protein 6.4 6.0 - 8.2 g/dL    Globulin 2.7 1.9 - 3.5 g/dL    A-G Ratio 1.4 g/dL   MAGNESIUM   Result Value Ref Range    Magnesium 1.4 (L) 1.5 - 2.5 mg/dL   TROPONIN   Result Value Ref Range    Troponin T 10 6 - 19 ng/L   PROTHROMBIN TIME   Result Value Ref Range    PT 16.0 (H) 12.0 - 14.6 sec    INR 1.24 (H) 0.87 - 1.13   APTT   Result Value Ref Range    APTT 44.4 (H) 24.7 - 36.0 sec   SARS-CoV-2 PCR (24 hour In-House): Collect [facility] swab in [facility]    Specimen: Nasopharyngeal; Respirate   Result Value Ref Range    SARS-CoV-2 Source NP Swab     SARS-CoV-2 by PCR NotDetected    ESTIMATED GFR   Result Value Ref Range    GFR If African American >60 >60 mL/min/1.73 m 2    GFR If Non African American >60 >60 mL/min/1.73 m 2   PHOSPHORUS   Result Value Ref Range    Phosphorus 3.3 2.5 - 4.5 mg/dL   DIFFERENTIAL MANUAL   Result Value Ref Range    Bands-Stabs 33.90 (H) 0.00 - 10.00 %    Metamyelocytes 7.00 %    Manual Diff Status PERFORMED    PERIPHERAL SMEAR REVIEW   Result Value Ref Range    Peripheral Smear Review see below    PLATELET ESTIMATE   Result Value Ref Range    Plt Estimation Normal    MORPHOLOGY   Result Value Ref Range    RBC Morphology Present    CORTISOL   Result Value Ref Range    Cortisol 36.7 (H) 0.0 - 23.0 ug/dL   OSMOLALITY SERUM   Result Value Ref Range    Osmolality Serum 259 (L) 278 - 298  mOsm/kg H2O   OSMOLALITY URINE   Result Value Ref Range    Osmolality Urine 370 300 - 900 mOsm/kg H2O   URINE SODIUM RANDOM   Result Value Ref Range    Sodium, Urine -per volume <20 mmol/L   TSH   Result Value Ref Range    TSH 1.210 0.380 - 5.330 uIU/mL   CBC with Differential   Result Value Ref Range    WBC 7.8 4.8 - 10.8 K/uL    RBC 4.06 (L) 4.20 - 5.40 M/uL    Hemoglobin 10.4 (L) 12.0 - 16.0 g/dL    Hematocrit 31.9 (L) 37.0 - 47.0 %    MCV 78.6 (L) 81.4 - 97.8 fL    MCH 25.6 (L) 27.0 - 33.0 pg    MCHC 32.6 (L) 33.6 - 35.0 g/dL    RDW 51.6 (H) 35.9 - 50.0 fL    Platelet Count 217 164 - 446 K/uL    MPV 10.4 9.0 - 12.9 fL    Neutrophils-Polys 88.70 (H) 44.00 - 72.00 %    Lymphocytes 1.70 (L) 22.00 - 41.00 %    Monocytes 1.70 0.00 - 13.40 %    Eosinophils 0.00 0.00 - 6.90 %    Basophils 0.00 0.00 - 1.80 %    Nucleated RBC 0.00 /100 WBC    Neutrophils (Absolute) 7.46 (H) 2.00 - 7.15 K/uL    Lymphs (Absolute) 0.13 (L) 1.00 - 4.80 K/uL    Monos (Absolute) 0.13 0.00 - 0.85 K/uL    Eos (Absolute) 0.00 0.00 - 0.51 K/uL    Baso (Absolute) 0.00 0.00 - 0.12 K/uL    NRBC (Absolute) 0.00 K/uL   Comp Metabolic Panel (CMP)   Result Value Ref Range    Sodium 124 (L) 135 - 145 mmol/L    Potassium 3.4 (L) 3.6 - 5.5 mmol/L    Chloride 89 (L) 96 - 112 mmol/L    Co2 25 20 - 33 mmol/L    Anion Gap 10.0 7.0 - 16.0    Glucose 149 (H) 65 - 99 mg/dL    Bun 15 8 - 22 mg/dL    Creatinine 0.56 0.50 - 1.40 mg/dL    Calcium 7.8 (L) 8.5 - 10.5 mg/dL    AST(SGOT) 27 12 - 45 U/L    ALT(SGPT) 45 2 - 50 U/L    Alkaline Phosphatase 132 (H) 30 - 99 U/L    Total Bilirubin 0.3 0.1 - 1.5 mg/dL    Albumin 3.3 3.2 - 4.9 g/dL    Total Protein 5.9 (L) 6.0 - 8.2 g/dL    Globulin 2.6 1.9 - 3.5 g/dL    A-G Ratio 1.3 g/dL   Magnesium   Result Value Ref Range    Magnesium 2.6 (H) 1.5 - 2.5 mg/dL   ESTIMATED GFR   Result Value Ref Range    GFR If African American >60 >60 mL/min/1.73 m 2    GFR If Non African American >60 >60 mL/min/1.73 m 2   DIFFERENTIAL MANUAL    Result Value Ref Range    Bands-Stabs 7.00 0.00 - 10.00 %    Metamyelocytes 0.90 %    Manual Diff Status PERFORMED    PERIPHERAL SMEAR REVIEW   Result Value Ref Range    Peripheral Smear Review see below    PLATELET ESTIMATE   Result Value Ref Range    Plt Estimation Normal    MORPHOLOGY   Result Value Ref Range    RBC Morphology Normal    EKG   Result Value Ref Range    Report       [facility] Emergency Dept.    Test Date:  2021  Pt Name:    BRIGID DAWSON                 Department: ER  MRN:        [id number]                      Room:       T211  Gender:     Female                       Technician: [id number]  :        1970                   Requested By:JULIAN ZENG  Order #:    [id number]                    Reading MD: Julian Zeng MD    Measurements  Intervals                                Axis  Rate:       115                          P:          61  ID:         [id number]                          QRS:        -21  QRSD:       125                          T:          58  QT:         303  QTc:        419    Interpretive Statements  Sinus tachycardia  Ventricular premature complex  Aberrant complex  Nonspecific intraventricular conduction delay  Compared to ECG 2020 11:42:38  Ventricular premature complex(es) now present  Aberrant conduction of supraventricular beat(s) now present  Intraventricular conduction delay now present   T-wave abnormality no longer present  No STEMI, limited 2/2 motion  Electronically Signed On 2021 7:30:19 PDT by Julian Zeng MD     POCT glucose device results   Result Value Ref Range    Glucose - Accu-Ck 75 65 - 99 mg/dL         RADIOLOGY  DX-CHEST-PORTABLE (1 VIEW)   Final Result      Mild interstitial edema. No focal consolidation.      CT-CEREBRAL PERFUSION ANALYSIS   Final Result      1.  Cerebral blood flow less than 30% likely representing completed infarct = 0 mL.      2.  T Max more than 6 seconds likely representing combination of  completed infarct and ischemia = 0 mL.      3.  Mismatched volume likely representing ischemic brain/penumbra = 0 mL.      4.  Please note that the cerebral perfusion was performed on the limited brain tissue around the basal ganglia region. Infarct/ischemia outside the CT perfusion sections can be missed in this study.      CT-CTA NECK WITH & W/O-POST PROCESSING   Final Result      No high-grade stenosis, large vessel occlusion, aneurysm or dissection.      CT-CTA HEAD WITH & W/O-POST PROCESS   Final Result      No large vessel occlusion, high-grade stenosis or aneurysm of the [place] of [place].      CT-HEAD W/O   Final Result      Stable appearance of right frontal  shunt crossing the midline into the left lateral ventricle.      No evidence of intracranial hemorrhage.          COURSE & MEDICAL DECISION MAKING    This is a 50 y.o. female who presents with headache, now with right sided extremity symptoms.  Complex migraine history but also apparently a history of stroke.    Differential Diagnosis includes but is not limited to:  Complex migraine, low abnormality, stroke, bleed,  shunt malfunction    ED Course:  Unfortunate 50-year-old female with complicated medical history mostly related to migraine had for 3 days but 1 hour of paresthesias and reported weakness in the right arm and leg.  5 out of 5 strength in all extremities NIH stroke scale at most 2 on arrival due to sensory changes in the right arm and leg.  Stroke  protocol activated, but plan treatment for migraine as well.    Neurologist Dr. Reyes evaluated the patient immediately as well in the emergency department agrees with plan of care.  She has a  shunt in place and a low NIH stroke scale, and she is quickly improving with migraine cocktail thus the patient is not a good candidate for alteplase/TPA.  Stroke imaging reassuring, no large vessel occlusion.  No bleed.  Interestingly the patient has significant hyponatremia labs otherwise  reassuring.  Some improvements in sensory changes but still has headache, given new hyponatremia to the low 120s plan admission for intractable headache and recheck of sodium in the morning.  Patient and  understand and agree with plan of care.  Discussed with hospitalist team who will kindly observe the patient for further work-up and treatment.    Medications   ketorolac (TORADOL) injection 30 mg (30 mg Intravenous Given 4/9/21 0649)   aspirin (ASA) chewable tab 81 mg (81 mg Oral Given 4/9/21 0623)   divalproex ER (DEPAKOTE ER) tablet 250 mg (250 mg Oral Given 4/9/21 0623)   DULoxetine (CYMBALTA) capsule 60 mg (60 mg Oral Given 4/9/21 0623)   ketorolac (TORADOL) injection 60 mg (has no administration in time range)   PARoxetine (PAXIL) tablet 10 mg (10 mg Oral Given 4/9/21 0623)   risperiDONE (RISPERDAL) tablet 2 mg (2 mg Oral Given 4/9/21 0623)   Respiratory Therapy Consult (has no administration in time range)   acetaminophen (Tylenol) tablet 650 mg (0 mg Oral Dose not Required 4/8/21 1930)   enalaprilat (VASOTEC) injection 1.25 mg (has no administration in time range)   ondansetron (ZOFRAN) syringe/vial injection 4 mg (has no administration in time range)   ondansetron (ZOFRAN ODT) dispertab 4 mg (has no administration in time range)   senna-docusate (PERICOLACE or SENOKOT S) 8.6-50 MG per tablet 2 tablet (2 Tablets Oral Refused 4/9/21 0600)     And   polyethylene glycol/lytes (MIRALAX) PACKET 1 Packet (has no administration in time range)     And   magnesium hydroxide (MILK OF MAGNESIA) suspension 30 mL (has no administration in time range)     And   bisacodyl (DULCOLAX) suppository 10 mg (has no administration in time range)   Pharmacy Consult Request ...Pain Management Review 1 Each (has no administration in time range)   enoxaparin (LOVENOX) inj 40 mg (40 mg Subcutaneous Refused 4/9/21 0600)   acetaminophen (Tylenol) tablet 650 mg (650 mg Oral Given 4/9/21 0623)   oxyCODONE immediate-release  (ROXICODONE) tablet 2.5 mg ( Oral See Alternative 4/8/21 2358)     Or   oxyCODONE immediate-release (ROXICODONE) tablet 5 mg (5 mg Oral Given 4/8/21 2358)     Or   HYDROmorphone (Dilaudid) injection 0.25 mg ( Intravenous See Alternative 4/8/21 2358)   diphenhydrAMINE (BENADRYL) injection 50 mg (50 mg Intravenous Given 4/9/21 0632)     And   metoclopramide (REGLAN) injection 10 mg (10 mg Intravenous Given 4/9/21 0623)   methylPREDNISolone (MEDROL) tablet 24 mg (has no administration in time range)     Followed by   methylPREDNISolone (MEDROL) 4 mg tablet (has no administration in time range)     Followed by   methylPREDNISolone (MEDROL) 4 mg tablet (has no administration in time range)     Followed by   methylPREDNISolone (MEDROL) 4 mg tablet (has no administration in time range)     Followed by   methylPREDNISolone (MEDROL) 4 mg tablet (has no administration in time range)     Followed by   methylPREDNISolone (MEDROL) 4 mg tablet (has no administration in time range)   potassium chloride SA (Kdur) tablet 20 mEq (has no administration in time range)   NS infusion 1,000 mL (1,000 mL Intravenous New Bag 4/8/21 1428)   metoclopramide (REGLAN) injection 10 mg (10 mg Intravenous Given 4/8/21 1321)   diphenhydrAMINE (BENADRYL) injection 50 mg (50 mg Intravenous Given 4/8/21 1320)   magnesium sulfate IVPB premix 2 g (0 g Intravenous Stopped 4/8/21 1627)   LORazepam (ATIVAN) injection 2 mg (2 mg Intravenous Given 4/8/21 1334)   iohexol (OMNIPAQUE) 350 mg/mL (100 mL Intravenous Given 4/8/21 1351)   iohexol (OMNIPAQUE) 350 mg/mL (40 mL Intravenous Given 4/8/21 1353)   magnesium sulfate IVPB premix 2 g (0 g Intravenous Stopped 4/8/21 2123)   dexamethasone pf (DECADRON) injection 10 mg (10 mg Intravenous Given 4/8/21 1741)   ketamine (KETALAR) 25 mg in NS 50 mL (low dose pain IVPB) (0 mg Intravenous Stopped 4/8/21 1831)   NS (BOLUS) infusion 1,000 mL (1,000 mL Intravenous New Bag 4/9/21 1480)       FINAL IMPRESSION  1. Acute  intractable headache, unspecified headache type    2. Dehydration    3. Hyponatremia    4. Paresthesia    5. Stroke-like symptom    6. Anxiety    7. S/P  shunt    8. Chronic pain syndrome          -Hospitalized for further work-up and treatment-       Pertinent Labs & Imaging studies reviewed and verified by myself, as well as nursing notes and the patient's past medical, family, and social histories (See chart for details).    Portions of this record were made with voice recognition software.  Despite my review, spelling/grammar/context errors may still remain.  Interpretation of this chart should be taken in this context.    Electronically signed by Julian Muhammad M.D. on 4/9/2021 at 7:38 AM.

## 2021-04-08 NOTE — ED NOTES
49 y/o female arroval time 1305. .  Chief Complaint   Patient presents with   • Possible Stroke     onset right side weakness and tingling 1200   • Migraine     x 3 days     t anxious on arrival with tremor. Significant hx migraines. Reports 3 days having a migraine and apx onset of right arm and left weakness with n/t at 1200. Pt difficult IV start taken to room 10 and assist RN to bedside to place US guided line. Pt medicated as ordered and taken to ct on monitor. Report to Primary RN

## 2021-04-08 NOTE — H&P
Hospital Medicine History & Physical Note    Date of Service  4/8/2021    Primary Care Physician  Elliott Power M.D.    Consultants  Neurology- Dr. Page     Code Status  Full Code    Chief Complaint  Chief Complaint   Patient presents with   • Possible Stroke     onset right side weakness and tingling 1200   • Migraine     x 3 days       History of Presenting Illness  50 y.o. female with known medical history of anemia, anxiety, arthritis, autoimmune disorder, chronic back pain with spine stimulator in place, depression, hydrocephalus with  shunt, frequent migraines with aura/intractable migraine, pituitary abnormality, seizures and previous stroke in 2007.  Patient was in her normal state of health until approximately 3 days ago when she began developing a headache/migraine.  Patient described headache as frontal lobe throbbing, is sensitive to light and sound, and states this is like her normal migraines.  She developed  right-sided upper extremity weakness that started at 12:00 today, which caused her concern and she presented to the emergency room for further evaluation.  Patient stated that she has had numbness and tingling since noon and upper right extremity.  Patient states she has chronic migraines frequently at home however every few months they become severe and present with right-sided weakness similar to her previous stroke.  Patient states that she usually requires ketamine infusion to break her headache.   Code stroke was called and patient was evaluated by neurology.  Neurology feels this is likely complex migraine, and ordered IV magnesium, Reglan and Benadryl as headache cocktail.  CBC was obtained which shows leukocytosis at 12.3, anemia with hemoglobin of 10.5 and hematocrit of 32.2.  CMP shows hyponatremia at 122 hypokalemia at 3.3 and elevated liver enzyme of ALT at 56.  Magnesium level was noted to be low at 1.4.  CT head was completed which showed stable appearance of right frontal   shunt crossing the midline into the left lateral ventricle, no evidence of intracranial hemorrhage.  CTA head and neck showed no large vessel occlusion, high-grade stenosis or aneurysm.  CT cerebral perfusion study within normal limits.  Patient to be admitted to observation unit for continued migraine/complex headache management.    Review of Systems  Review of Systems   Constitutional: Negative for chills, fever and malaise/fatigue.   HENT: Negative for congestion and sore throat.    Eyes: Positive for photophobia and pain. Negative for discharge.   Respiratory: Negative for cough and shortness of breath.    Cardiovascular: Negative for chest pain and leg swelling.   Gastrointestinal: Positive for nausea. Negative for abdominal pain, diarrhea and vomiting.   Genitourinary: Negative for dysuria, frequency and urgency.   Musculoskeletal: Positive for back pain and myalgias.   Neurological: Positive for dizziness, tremors, weakness and headaches.   Psychiatric/Behavioral: The patient is nervous/anxious.        Past Medical History   has a past medical history of Allergy, unspecified not elsewhere classified, Anemia (10/05/2017), Anesthesia, Anxiety, Anxiety, generalized, Arthritis, autoimmune, Autoimmune disorder ([facility]), Back pain, Clostridium difficile diarrhea (2014), Depression, Elevated liver enzymes (2017), Fall, H/O Clostridium difficile infection (2014), MRSA infection (2003), Hydrocephalus ([facility]) (2015), Joint pain (09/10/2019), Migraine with aura, with intractable migraine, so stated, without mention of status migrainosus, MRSA (methicillin resistant Staphylococcus aureus) (08/2015), MRSA (methicillin resistant Staphylococcus aureus) (12/2017), MRSA (methicillin resistant Staphylococcus aureus) (2018), Pituitary abnormality ([facility]) (2002), Requires supplemental oxygen, Seizure ([facility]) (started 2017), Staph infection, and Stroke ([facility]) (2007).    Surgical History   has a past surgical history that includes  "tonsillectomy (1985); other neurological surg (1317-5030); irrigation & debridement neuro (N/A, 6/28/2015); lumbar exploration (N/A, 8/31/2015); spinal cord stimulator (12/19/2016);  shunt insertion (5/31/2017); liver biopsy (07/24/2017); cholecystectomy (2001); appendectomy (1982); spinal cord stimulator (05/24/2018); spinal cord stimulator (2003); knee arthroscopy (Right, 1990); full thickness skin graft (Left, 04/2018); other surgical procedure (11/10/2017); other neurological surg (08/2015); endometrial ablation (2001); tubal coagulation laparoscopic bilateral (Bilateral, 2001); pr implant neurostim/ (9/13/2019); and other (Left, 01/23/2020).     Family History  family history includes Alcohol abuse in her father; Diabetes in her father; Mult Sclerosis in her mother.     Social History   reports that she has never smoked. She has never used smokeless tobacco. She reports previous alcohol use. She reports that she does not use drugs.    Allergies  Allergies   Allergen Reactions   • Sumatriptan Anaphylaxis     Historical=\"Heart stops.\" Reaction  between 1995 to 1997   • Prochlorperazine Rash and Swelling     Tongue swelling. Reaction as a teen. (compazine)  Tolerated Phenergan on 02/24/15   • Vancomycin Shortness of Breath and Rash     Reaction in 2005.  D/W patient 8/31/15 - tolerated loading dose of vancomycin administered on 8/30/15 with some itching to chest with decreased infusion rate. Red Man Syndrome.  2018-tolerated slow drip with benadryl pre-med-had no problems.   • Compazine Swelling       Medications  Prior to Admission Medications   Prescriptions Last Dose Informant Patient Reported? Taking?   AIMOVIG 70 MG/ML Solution Auto-injector APRIL 2021 at APRIL 2021 Patient Yes No   Sig: Inject 70 mg under the skin Q30 DAYS.   DULoxetine (CYMBALTA) 60 MG Cap DR Particles delayed-release capsule 4/7/2021 at PM Patient Yes No   Sig: Take 60 mg by mouth 2 times a day.   PARoxetine (PAXIL) 10 MG Tab " 2021 at PM Patient Yes No   Sig: Take 10 mg by mouth every day.   Ubrogepant (UBRELVY) 100 MG Tab 2021 at 0800 Patient Yes Yes   Sig: Take 100 mg by mouth 2 times a day as needed.   aspirin (ASA) 81 MG Chew Tab chewable tablet 2021 at AM Patient Yes No   Sig: Chew 81 mg every day.   diphenhydrAMINE (BENADRYL) 50 MG/ML Solution 2021 at PM Patient Yes No   Sig: Inject 75 mg into the shoulder, thigh, or buttocks 3 times a day as needed (for migraines).   divalproex ER (DEPAKOTE ER) 250 MG TABLET SR 24 HR 2021 at AM Patient Yes No   Sig: Take 250 mg by mouth 3 times a day.   ketorolac (TORADOL) 60 MG/2ML Solution 2021 at 1100 Patient Yes No   Si mg by Intramuscular route 2 times a day as needed (headache).   levETIRAcetam (KEPPRA) 750 MG tablet NOT TAKING at NOT TAKING Patient No No   Sig: Take 1 Tab by mouth 2 Times a Day.   Patient not taking: Reported on 2021   risperiDONE (RISPERDAL) 2 MG Tab 2021 at PM Patient Yes No   Sig: Take 2 mg by mouth 2 times a day.      Facility-Administered Medications: None       Physical Exam  Temp:  [36.6 °C (97.8 °F)-36.7 °C (98.1 °F)] 36.6 °C (97.8 °F)  Pulse:  [] 99  Resp:  [16-29] 20  BP: (103-128)/(51-82) 128/79  SpO2:  [89 %-100 %] 97 %    Physical Exam  Vitals and nursing note reviewed.   Constitutional:       Appearance: Normal appearance.   HENT:      Head: Normocephalic and atraumatic.      Mouth/Throat:      Lips: Pink.      Mouth: Mucous membranes are moist.   Eyes:      General: Lids are normal.   Cardiovascular:      Rate and Rhythm: Regular rhythm. Tachycardia present.      Heart sounds: Normal heart sounds. No friction rub.   Pulmonary:      Effort: Pulmonary effort is normal. No respiratory distress.      Breath sounds: Normal breath sounds. No decreased breath sounds or wheezing.   Abdominal:      General: Bowel sounds are normal.      Palpations: Abdomen is soft.      Tenderness: There is no abdominal tenderness.    Musculoskeletal:      Cervical back: Neck supple.      Comments: Equal strength through out   No deficits    Skin:     General: Skin is warm and dry.   Neurological:      Mental Status: She is alert and oriented to person, place, and time.      Cranial Nerves: No facial asymmetry.      Motor: Tremor (left hand intentional) present. No weakness.   Psychiatric:         Mood and Affect: Affect is flat.         Speech: Speech normal.         Behavior: Behavior is cooperative.         Cognition and Memory: Cognition normal.         Judgment: Judgment normal.      Comments:  states slurred speech but not noted during exam          Laboratory:  Recent Labs     04/08/21  1333   WBC 12.3*   RBC 4.11*   HEMOGLOBIN 10.5*   HEMATOCRIT 32.2*   MCV 78.3*   MCH 25.5*   MCHC 32.6*   RDW 51.4*   PLATELETCT 250   MPV 10.2     Recent Labs     04/08/21  1333   SODIUM 122*   POTASSIUM 3.3*   CHLORIDE 87*   CO2 20   GLUCOSE 92   BUN 17   CREATININE 0.95   CALCIUM 8.2*     Recent Labs     04/08/21  1333   ALTSGPT 56*   ASTSGOT 39   ALKPHOSPHAT 144*   TBILIRUBIN 0.3   GLUCOSE 92     Recent Labs     04/08/21  1333   APTT 44.4*   INR 1.24*     No results for input(s): NTPROBNP in the last 72 hours.      Recent Labs     04/08/21  1333   TROPONINT 10       Imaging:  DX-CHEST-PORTABLE (1 VIEW)   Final Result      Mild interstitial edema. No focal consolidation.      CT-CEREBRAL PERFUSION ANALYSIS   Final Result      1.  Cerebral blood flow less than 30% likely representing completed infarct = 0 mL.      2.  T Max more than 6 seconds likely representing combination of completed infarct and ischemia = 0 mL.      3.  Mismatched volume likely representing ischemic brain/penumbra = 0 mL.      4.  Please note that the cerebral perfusion was performed on the limited brain tissue around the basal ganglia region. Infarct/ischemia outside the CT perfusion sections can be missed in this study.      CT-CTA NECK WITH & W/O-POST PROCESSING   Final  Result      No high-grade stenosis, large vessel occlusion, aneurysm or dissection.      CT-CTA HEAD WITH & W/O-POST PROCESS   Final Result      No large vessel occlusion, high-grade stenosis or aneurysm of the [place].      CT-HEAD W/O   Final Result      Stable appearance of right frontal  shunt crossing the midline into the left lateral ventricle.      No evidence of intracranial hemorrhage.            Assessment/Plan:  I anticipate this patient is appropriate for observation status at this time.    Complicated migraine- (present on admission)  Assessment & Plan  Migraine cocktail in the ER  Followed by IV Decadron 1 today and 1 L saline bolus  Scheduled IV Reglan and diphenhydramine  Continue Depakote  Steroid taper starting 4/9/2021  Possible Ketamine infusion  Follow up with Neurology headache clinic   Neurology consulted     Hyponatremia- (present on admission)  Assessment & Plan  1 L bolus already administered in the ER  Trend BMP. Check TSH and cortisol. Send for Posm, Uosm, Stephon    history of pseudotumor cerebri- (present on admission)  Assessment & Plan  No ventriculomegaly noted on CT    S/P  shunt- (present on admission)  Assessment & Plan  No ventriculomegaly on CT  Consider  shunt setting check per spine    H/O: CVA (cerebrovascular accident)- (present on admission)  Assessment & Plan  Previous CVA with similar side affected  MRI unable to be completed in presence of spinal stimulator       DVT prophylaxis :Lovenox

## 2021-04-08 NOTE — PROGRESS NOTES
Patient to floor from stretcher with cardiac monitor in place. Patient ambulated to bed from stretcher with minimal assist.

## 2021-04-09 ENCOUNTER — PATIENT OUTREACH (OUTPATIENT)
Dept: HEALTH INFORMATION MANAGEMENT | Facility: OTHER | Age: 51
End: 2021-04-09

## 2021-04-09 VITALS
HEART RATE: 66 BPM | WEIGHT: 147.27 LBS | SYSTOLIC BLOOD PRESSURE: 95 MMHG | RESPIRATION RATE: 14 BRPM | BODY MASS INDEX: 26.09 KG/M2 | TEMPERATURE: 96.1 F | DIASTOLIC BLOOD PRESSURE: 63 MMHG | OXYGEN SATURATION: 99 % | HEIGHT: 63 IN

## 2021-04-09 LAB
ALBUMIN SERPL BCP-MCNC: 3.3 G/DL (ref 3.2–4.9)
ALBUMIN/GLOB SERPL: 1.3 G/DL
ALP SERPL-CCNC: 132 U/L (ref 30–99)
ALT SERPL-CCNC: 45 U/L (ref 2–50)
ANION GAP SERPL CALC-SCNC: 10 MMOL/L (ref 7–16)
AST SERPL-CCNC: 27 U/L (ref 12–45)
BASOPHILS # BLD AUTO: 0 % (ref 0–1.8)
BASOPHILS # BLD: 0 K/UL (ref 0–0.12)
BILIRUB SERPL-MCNC: 0.3 MG/DL (ref 0.1–1.5)
BUN SERPL-MCNC: 15 MG/DL (ref 8–22)
CALCIUM SERPL-MCNC: 7.8 MG/DL (ref 8.5–10.5)
CHLORIDE SERPL-SCNC: 89 MMOL/L (ref 96–112)
CO2 SERPL-SCNC: 25 MMOL/L (ref 20–33)
CREAT SERPL-MCNC: 0.56 MG/DL (ref 0.5–1.4)
EKG IMPRESSION: NORMAL
EOSINOPHIL # BLD AUTO: 0 K/UL (ref 0–0.51)
EOSINOPHIL NFR BLD: 0 % (ref 0–6.9)
ERYTHROCYTE [DISTWIDTH] IN BLOOD BY AUTOMATED COUNT: 51.6 FL (ref 35.9–50)
GLOBULIN SER CALC-MCNC: 2.6 G/DL (ref 1.9–3.5)
GLUCOSE SERPL-MCNC: 149 MG/DL (ref 65–99)
HCT VFR BLD AUTO: 31.9 % (ref 37–47)
HGB BLD-MCNC: 10.4 G/DL (ref 12–16)
LYMPHOCYTES # BLD AUTO: 0.13 K/UL (ref 1–4.8)
LYMPHOCYTES NFR BLD: 1.7 % (ref 22–41)
MAGNESIUM SERPL-MCNC: 2.6 MG/DL (ref 1.5–2.5)
MANUAL DIFF BLD: NORMAL
MCH RBC QN AUTO: 25.6 PG (ref 27–33)
MCHC RBC AUTO-ENTMCNC: 32.6 G/DL (ref 33.6–35)
MCV RBC AUTO: 78.6 FL (ref 81.4–97.8)
METAMYELOCYTES NFR BLD MANUAL: 0.9 %
MONOCYTES # BLD AUTO: 0.13 K/UL (ref 0–0.85)
MONOCYTES NFR BLD AUTO: 1.7 % (ref 0–13.4)
MORPHOLOGY BLD-IMP: NORMAL
NEUTROPHILS # BLD AUTO: 7.46 K/UL (ref 2–7.15)
NEUTROPHILS NFR BLD: 88.7 % (ref 44–72)
NEUTS BAND NFR BLD MANUAL: 7 % (ref 0–10)
NRBC # BLD AUTO: 0 K/UL
NRBC BLD-RTO: 0 /100 WBC
PLATELET # BLD AUTO: 217 K/UL (ref 164–446)
PLATELET BLD QL SMEAR: NORMAL
PMV BLD AUTO: 10.4 FL (ref 9–12.9)
POTASSIUM SERPL-SCNC: 3.4 MMOL/L (ref 3.6–5.5)
PROT SERPL-MCNC: 5.9 G/DL (ref 6–8.2)
RBC # BLD AUTO: 4.06 M/UL (ref 4.2–5.4)
RBC BLD AUTO: NORMAL
SODIUM SERPL-SCNC: 124 MMOL/L (ref 135–145)
WBC # BLD AUTO: 7.8 K/UL (ref 4.8–10.8)

## 2021-04-09 PROCEDURE — 99217 PR OBSERVATION CARE DISCHARGE: CPT | Performed by: INTERNAL MEDICINE

## 2021-04-09 PROCEDURE — G0378 HOSPITAL OBSERVATION PER HR: HCPCS

## 2021-04-09 PROCEDURE — A9270 NON-COVERED ITEM OR SERVICE: HCPCS | Performed by: NURSE PRACTITIONER

## 2021-04-09 PROCEDURE — 96375 TX/PRO/DX INJ NEW DRUG ADDON: CPT

## 2021-04-09 PROCEDURE — 700111 HCHG RX REV CODE 636 W/ 250 OVERRIDE (IP): Performed by: NURSE PRACTITIONER

## 2021-04-09 PROCEDURE — 700105 HCHG RX REV CODE 258: Performed by: STUDENT IN AN ORGANIZED HEALTH CARE EDUCATION/TRAINING PROGRAM

## 2021-04-09 PROCEDURE — 80053 COMPREHEN METABOLIC PANEL: CPT

## 2021-04-09 PROCEDURE — 96376 TX/PRO/DX INJ SAME DRUG ADON: CPT

## 2021-04-09 PROCEDURE — 85027 COMPLETE CBC AUTOMATED: CPT

## 2021-04-09 PROCEDURE — 700102 HCHG RX REV CODE 250 W/ 637 OVERRIDE(OP): Performed by: NURSE PRACTITIONER

## 2021-04-09 PROCEDURE — 85007 BL SMEAR W/DIFF WBC COUNT: CPT

## 2021-04-09 PROCEDURE — 700111 HCHG RX REV CODE 636 W/ 250 OVERRIDE (IP): Performed by: INTERNAL MEDICINE

## 2021-04-09 PROCEDURE — 83735 ASSAY OF MAGNESIUM: CPT

## 2021-04-09 RX ORDER — POTASSIUM CHLORIDE 20 MEQ/1
20 TABLET, EXTENDED RELEASE ORAL ONCE
Status: COMPLETED | OUTPATIENT
Start: 2021-04-09 | End: 2021-04-09

## 2021-04-09 RX ORDER — SODIUM CHLORIDE 9 MG/ML
INJECTION, SOLUTION INTRAVENOUS CONTINUOUS
Status: DISCONTINUED | OUTPATIENT
Start: 2021-04-09 | End: 2021-04-09

## 2021-04-09 RX ORDER — SODIUM CHLORIDE 9 MG/ML
1000 INJECTION, SOLUTION INTRAVENOUS ONCE
Status: COMPLETED | OUTPATIENT
Start: 2021-04-09 | End: 2021-04-09

## 2021-04-09 RX ORDER — SODIUM CHLORIDE 1 G/1
1 TABLET ORAL
Status: DISCONTINUED | OUTPATIENT
Start: 2021-04-09 | End: 2021-04-09 | Stop reason: HOSPADM

## 2021-04-09 RX ORDER — SODIUM CHLORIDE 1 G/1
1 TABLET ORAL DAILY
Qty: 30 TABLET | Refills: 0 | Status: ON HOLD | OUTPATIENT
Start: 2021-04-09 | End: 2021-06-17

## 2021-04-09 RX ADMIN — RISPERIDONE 2 MG: 2 TABLET ORAL at 06:23

## 2021-04-09 RX ADMIN — DIPHENHYDRAMINE HYDROCHLORIDE 50 MG: 50 INJECTION INTRAMUSCULAR; INTRAVENOUS at 06:32

## 2021-04-09 RX ADMIN — DIVALPROEX SODIUM 250 MG: 250 TABLET, EXTENDED RELEASE ORAL at 06:23

## 2021-04-09 RX ADMIN — SODIUM CHLORIDE 1000 ML: 9 INJECTION, SOLUTION INTRAVENOUS at 04:19

## 2021-04-09 RX ADMIN — KETOROLAC TROMETHAMINE 30 MG: 30 INJECTION, SOLUTION INTRAMUSCULAR; INTRAVENOUS at 06:49

## 2021-04-09 RX ADMIN — METOCLOPRAMIDE 10 MG: 5 INJECTION, SOLUTION INTRAMUSCULAR; INTRAVENOUS at 06:23

## 2021-04-09 RX ADMIN — DULOXETINE HYDROCHLORIDE 60 MG: 60 CAPSULE, DELAYED RELEASE ORAL at 06:23

## 2021-04-09 RX ADMIN — Medication 1 G: at 10:22

## 2021-04-09 RX ADMIN — METHYLPREDNISOLONE 24 MG: 16 TABLET ORAL at 07:50

## 2021-04-09 RX ADMIN — ACETAMINOPHEN 650 MG: 325 TABLET, FILM COATED ORAL at 06:23

## 2021-04-09 RX ADMIN — ASPIRIN 81 MG: 81 TABLET, CHEWABLE ORAL at 06:23

## 2021-04-09 RX ADMIN — SODIUM CHLORIDE: 9 INJECTION, SOLUTION INTRAVENOUS at 05:24

## 2021-04-09 RX ADMIN — POTASSIUM CHLORIDE 20 MEQ: 1500 TABLET, EXTENDED RELEASE ORAL at 07:50

## 2021-04-09 RX ADMIN — PAROXETINE HYDROCHLORIDE 10 MG: 10 TABLET, FILM COATED ORAL at 06:23

## 2021-04-09 RX ADMIN — KETOROLAC TROMETHAMINE 30 MG: 30 INJECTION, SOLUTION INTRAMUSCULAR; INTRAVENOUS at 06:48

## 2021-04-09 ASSESSMENT — PAIN DESCRIPTION - PAIN TYPE: TYPE: ACUTE PAIN

## 2021-04-09 ASSESSMENT — COPD QUESTIONNAIRES
DO YOU EVER COUGH UP ANY MUCUS OR PHLEGM?: NO/ONLY WITH OCCASIONAL COLDS OR INFECTIONS
HAVE YOU SMOKED AT LEAST 100 CIGARETTES IN YOUR ENTIRE LIFE: NO/DON'T KNOW

## 2021-04-09 NOTE — PROGRESS NOTES
Assumed care of pt at 0700. Report received and bedside rounding completed with night RN. Pt is calm no SOB, no acute distress noted.  Call light and pt belongings within reach - hourly rounding in place. See flowsheets for further assessment.     Pt on 4L oxymask. Migraine 8/10. Maintaining a dark quiet environment. POC discussed with pt and significant other at bedside. Questions answered.

## 2021-04-09 NOTE — PROGRESS NOTES
RN notified of Hypotension with SBP low 80's verified by manual cuff. Dr. Birmingham notified. See Mar for new orders.

## 2021-04-09 NOTE — PROGRESS NOTES
Replacement mag administered per orders. Patient still agitated at this time. Patient sweating. AOx3. Family at bedside. Patient still having pain

## 2021-04-09 NOTE — DISCHARGE SUMMARY
Discharge Summary    CHIEF COMPLAINT ON ADMISSION  Chief Complaint   Patient presents with   • Possible Stroke     onset right side weakness and tingling 1200   • Migraine     x 3 days       Reason for Admission  Migraine     Admission Date  4/8/2021    CODE STATUS  Full Code    HPI & HOSPITAL COURSE  This is a 50 y.o. female here with exacerbation of chronic intractable headache.  Please see the dictated history and physical note for complete details.  In short, she has complicated migraine history stemming from intracranial hypertension requiring  shunt, chronic pain with implantation of a spinal cord stimulator, prior CVA, chronic hyponatremia, anemia, anxiety, autoimmune disorder NOS, depression, who presented with exacerbation of her pain.  Neurology was consulted on admission and recommended a migraine cocktail.  Patient received multiple therapies during her stay including ketamine which typically breaks her headache.    She was admitted to the observation unit and monitored overnight.  Of note, she underwent stroke protocol including CT head, CTA of the head and neck including CT cerebral perfusion study.  MRI was contraindicated due to her spinal cord stimulator.  There was no significant acute identified to explain her exacerbation.  Pathology her intracranial ventricles were overall stable compared to prior scans.  There was no evidence of malfunction of her  shunt.  She had mild elevation of her alkaline phosphatase which decreased the following day with fluids.  Her sodium improved with the administration of IV fluids.  Serum osmolality was low.  Urine osmolality was in the normal range.  She was euvolemic.  TSH was normal.  Likely her medications have caused her hyponatremia as opposed to SIADH although this could be a contributing component.    The next morning the patient reports reduction in her pain and readiness to discharge home.  Everything was discussed with her  who was present  during our encounter.  We have encouraged to liberal sodium diet and will prescribe 1 sodium chloride tablet for her to try at home.  She could also consider fluid restriction.  However, typically improves with IV fluid resuscitation with regard to her pain.  She is on multiple medications due to her comorbidities that could lower her sodium and this will take careful consideration of her outpatient providers.  At the present time she is improved and willing and ready for discharge.    Therefore, she is discharged in good and stable condition to home with close outpatient follow-up.    The patient recovered much more quickly than anticipated on admission.    Discharge Date  4/9/2021    FOLLOW UP ITEMS POST DISCHARGE  PCP follow-up  Neurology follow-up for the headache clinic  [facility] [place] follow-up with her chronic pain providers per routine    DISCHARGE DIAGNOSES  Active Problems:    Complicated migraine POA: Yes    S/P  shunt POA: Yes    history of pseudotumor cerebri (Chronic) POA: Yes      Overview: IMO load March 2020    Hyponatremia POA: Yes    H/O: CVA (cerebrovascular accident) (Chronic) POA: Yes  Resolved Problems:    * No resolved hospital problems. *      FOLLOW UP  No future appointments.  No follow-up provider specified.    MEDICATIONS ON DISCHARGE     Medication List      Start taking these medications      Instructions   sodium chloride 1 GM Tabs  Commonly known as: SALT   Take 1 tablet by mouth every day.  Dose: 1 g        Continue taking these medications      Instructions   Aimovig 70 MG/ML Soaj  Generic drug: Erenumab   Inject 70 mg under the skin Q30 DAYS.  Dose: 70 mg     aspirin 81 MG Chew chewable tablet  Commonly known as: ASA   Chew 81 mg every day.  Dose: 81 mg     diphenhydrAMINE 50 MG/ML Soln  Commonly known as: BENADRYL   Inject 75 mg into the shoulder, thigh, or buttocks 3 times a day as needed (for migraines).  Dose: 75 mg     divalproex  MG Tb24  Commonly known as: DEPAKOTE  "ER   Take 250 mg by mouth 3 times a day.  Dose: 250 mg     DULoxetine 60 MG Cpep delayed-release capsule  Commonly known as: CYMBALTA   Take 60 mg by mouth 2 times a day.  Dose: 60 mg     ketorolac 60 MG/2ML Soln  Commonly known as: TORADOL   60 mg by Intramuscular route 2 times a day as needed (headache).  Dose: 60 mg     levetiracetam 750 MG tablet  Commonly known as: KEPPRA   Take 1 Tab by mouth 2 Times a Day.  Dose: 750 mg     PARoxetine 10 MG Tabs  Commonly known as: PAXIL   Take 10 mg by mouth every day.  Dose: 10 mg     risperiDONE 2 MG Tabs  Commonly known as: RISPERDAL   Take 2 mg by mouth 2 times a day.  Dose: 2 mg     Ubrelvy 100 MG Tabs  Generic drug: Ubrogepant   Take 100 mg by mouth 2 times a day as needed.  Dose: 100 mg            Allergies  Allergies   Allergen Reactions   • Sumatriptan Anaphylaxis     Historical=\"Heart stops.\" Reaction  between 1995 to 1997   • Prochlorperazine Rash and Swelling     Tongue swelling. Reaction as a teen. (compazine)  Tolerated Phenergan on 02/24/15   • Vancomycin Shortness of Breath and Rash     Reaction in 2005.  D/W patient 8/31/15 - tolerated loading dose of vancomycin administered on 8/30/15 with some itching to chest with decreased infusion rate. Red Man Syndrome.  2018-tolerated slow drip with benadryl pre-med-had no problems.   • Compazine Swelling       DIET  Orders Placed This Encounter   Procedures   • Diet Order Diet: Regular; Fluid modifications: (optional): 1200 ml Fluid Restriction     Standing Status:   Standing     Number of Occurrences:   1     Order Specific Question:   Diet:     Answer:   Regular [1]     Order Specific Question:   Fluid modifications: (optional)     Answer:   1200 ml Fluid Restriction [8]       ACTIVITY  As tolerated.  Weight bearing as tolerated    CONSULTATIONS  Neurology    LABORATORY  Lab Results   Component Value Date    SODIUM 124 (L) 04/09/2021    POTASSIUM 3.4 (L) 04/09/2021    CHLORIDE 89 (L) 04/09/2021    CO2 25 04/09/2021 "    GLUCOSE 149 (H) 04/09/2021    BUN 15 04/09/2021    CREATININE 0.56 04/09/2021        Lab Results   Component Value Date    WBC 7.8 04/09/2021    HEMOGLOBIN 10.4 (L) 04/09/2021    HEMATOCRIT 31.9 (L) 04/09/2021    PLATELETCT 217 04/09/2021        Total time of the discharge process exceeds 40 minutes.

## 2021-04-09 NOTE — CARE PLAN
Problem: Pain Management  Goal: Pain level will decrease to patient's comfort goal  Outcome: PROGRESSING SLOWER THAN EXPECTED     Problem: Safety  Goal: Will remain free from injury  Outcome: PROGRESSING AS EXPECTED  Goal: Will remain free from falls  Outcome: PROGRESSING AS EXPECTED

## 2021-04-09 NOTE — DISCHARGE INSTRUCTIONS
Discharge Instructions per EMERSON Stiles.    Discharge Instructions per Isidro Crews, APRN-BC    1.  Review your discharge Medication Reconciliation form. You may be provided with new prescriptions or changes to previously prescribed medications.  Be sure to take all of your prescribed medications exactly as directed.  Further questions can be directed to your local pharmacist or primary care provider (PCP).    2. Follow-up with your PCP.  An appointment may have already been scheduled for you.  Please review your discharge paperwork and locate this information.  Please be sure to call your PCP to cancel if you are unable to make this appointment or require schedule changes.  It is critical to to follow-up with your PCP to review hospital records and ensure any further diagnostic work-up, prescription refills, or referrals.     3. ACTIVITIES: After discharge from the hospital, you may resume routine activities including walking and going u/down stairs. However, no strenuous activity. For further clarification, call your PCP     4. DRIVING: You may drive whenever you are off pain medications and are able to perform the activities needed to drive, i.e. turning, bending, twisting, etc.     5. BOWEL FUNCTION: A few patients, after hospitalizations, will develop bowel irregularity. You could also experience constipation due to pain medication and decreased activity level. If you feel this is occurring, please take an over-the-counter laxative (Milk of Magnesia, Ex-Lax, Senokot, etc.) until the problem has resolved.     6. PAIN MEDICATION: You may be given a prescription for pain medication at discharge. Please take these as directed. It is important to remember not to take medications on an empty stomach as this may cause nausea/vomiting.  You can transition from the prescription-strength pain medication to over-the-counter medications as your pain improves, such as tylenol if you are medically cleared to do so.      7. LABS/IMAGING: You may be asked to complete labs or imaging prior to your next provider appointment. If you use [facility], a paper order is not required. If you use another lab or imaging center, be sure you have your order requisition form at discharge. Contact scheduling or use [employer] to arrange a lab appointment.    8. RETURN TO THE ER: Return to the ER if you develop recurrent chest pain, shortness of breath, bloody sputum, fever of 101.5 or greater, intractable nausea or vomiting, blood in the vomit or urine, intractable pain, new onset inability to ambulate, focal motor weakness, acute vision disturbance, acute speech disturbance, intractable abdominal pain, diffuse watery diarrhea or any other worrisome symptoms.    Discharge Instructions    Discharged to home by car with relative. Discharged via wheelchair, hospital escort: Yes.  Special equipment needed: Not Applicable    Be sure to schedule a follow-up appointment with your primary care doctor or any specialists as instructed.     Discharge Plan:   Diet Plan: Discussed  Activity Level: Discussed  Confirmed Follow up Appointment: Patient to Call and Schedule Appointment  Confirmed Symptoms Management: Discussed  Medication Reconciliation Updated: Yes    I understand that a diet low in cholesterol, fat, and sodium is recommended for good health. Unless I have been given specific instructions below for another diet, I accept this instruction as my diet prescription.   Other diet: Regular as tolerated. Salt as desired/ encouraged.     Special Instructions: None    · Is patient discharged on Warfarin / Coumadin?   No     Depression / Suicide Risk    As you are discharged from this [facility], it is important to learn how to keep safe from harming yourself.    Recognize the warning signs:  · Abrupt changes in personality, positive or negative- including increase in energy   · Giving away possessions  · Change in eating patterns- significant  weight changes-  positive or negative  · Change in sleeping patterns- unable to sleep or sleeping all the time   · Unwillingness or inability to communicate  · Depression  · Unusual sadness, discouragement and loneliness  · Talk of wanting to die  · Neglect of personal appearance   · Rebelliousness- reckless behavior  · Withdrawal from people/activities they love  · Confusion- inability to concentrate     If you or a loved one observes any of these behaviors or has concerns about self-harm, here's what you can do:  · Talk about it- your feelings and reasons for harming yourself  · Remove any means that you might use to hurt yourself (examples: pills, rope, extension cords, firearm)  · Get professional help from the community (Mental Health, Substance Abuse, psychological counseling)  · Do not be alone:Call your Safe Contact- someone whom you trust who will be there for you.  · Call your local CRISIS HOTLINE 480-9347 or 035-662-2836  · Call your local Children's Mobile Crisis Response Team [place] (778) 909-0470 or www.[employer]  · Call the toll free National Suicide Prevention Hotlines   · National Suicide Prevention Lifeline 204-484-ZRHS (1634)  · National Hope Line Network 800-SUICIDE (184-0333)

## 2021-04-09 NOTE — PROGRESS NOTES
Ketamine infusion completed. Patient agitated stating she feels claustrophobic.  at bedside. Vitals inputted.

## 2021-04-09 NOTE — PROGRESS NOTES
Pt in no acute distress no SOB. Discharge instruction information reviewed with pt. All questions answered. PIV removed. Prescriptions sent to pharmacy. Information on picking them. Pt left walking with significant other, per pt's preference.

## 2021-04-16 ENCOUNTER — HOSPITAL ENCOUNTER (OUTPATIENT)
Dept: RADIOLOGY | Facility: MEDICAL CENTER | Age: 51
End: 2021-04-16
Payer: MEDICARE

## 2021-04-16 ENCOUNTER — APPOINTMENT (OUTPATIENT)
Dept: RADIOLOGY | Facility: MEDICAL CENTER | Age: 51
DRG: 003 | End: 2021-04-16
Attending: INTERNAL MEDICINE
Payer: MEDICARE

## 2021-04-16 ENCOUNTER — HOSPITAL ENCOUNTER (INPATIENT)
Facility: MEDICAL CENTER | Age: 51
LOS: 63 days | DRG: 003 | End: 2021-06-18
Attending: INTERNAL MEDICINE | Admitting: INTERNAL MEDICINE
Payer: MEDICARE

## 2021-04-16 DIAGNOSIS — M54.59 INTRACTABLE LOW BACK PAIN: ICD-10-CM

## 2021-04-16 DIAGNOSIS — R58 BLOOD LOSS: ICD-10-CM

## 2021-04-16 DIAGNOSIS — J96.01 ACUTE RESPIRATORY FAILURE WITH HYPOXIA (HCC): Primary | ICD-10-CM

## 2021-04-16 DIAGNOSIS — R78.81 BACTEREMIA DUE TO METHICILLIN SUSCEPTIBLE STAPHYLOCOCCUS AUREUS (MSSA): ICD-10-CM

## 2021-04-16 DIAGNOSIS — J18.9 PNEUMONIA OF BOTH LUNGS DUE TO INFECTIOUS ORGANISM, UNSPECIFIED PART OF LUNG: ICD-10-CM

## 2021-04-16 DIAGNOSIS — A41.01: ICD-10-CM

## 2021-04-16 DIAGNOSIS — G93.41: ICD-10-CM

## 2021-04-16 DIAGNOSIS — M35.9 AUTOIMMUNE CEREBRITIS (HCC): ICD-10-CM

## 2021-04-16 DIAGNOSIS — Z76.5 DRUG-SEEKING BEHAVIOR: ICD-10-CM

## 2021-04-16 DIAGNOSIS — D64.9 ANEMIA, UNSPECIFIED TYPE: ICD-10-CM

## 2021-04-16 DIAGNOSIS — R65.21: ICD-10-CM

## 2021-04-16 DIAGNOSIS — J86.9 EMPYEMA OF RIGHT PLEURAL SPACE (HCC): ICD-10-CM

## 2021-04-16 DIAGNOSIS — R65.21 SEPTIC SHOCK (HCC): ICD-10-CM

## 2021-04-16 DIAGNOSIS — G05.3 AUTOIMMUNE CEREBRITIS (HCC): ICD-10-CM

## 2021-04-16 DIAGNOSIS — R53.81 DEBILITY: ICD-10-CM

## 2021-04-16 DIAGNOSIS — J85.1 ABSCESS OF LUNG WITH PNEUMONIA, UNSPECIFIED LATERALITY (HCC): ICD-10-CM

## 2021-04-16 DIAGNOSIS — I33.0 ACUTE BACTERIAL ENDOCARDITIS: ICD-10-CM

## 2021-04-16 DIAGNOSIS — G89.4 CHRONIC PAIN SYNDROME: ICD-10-CM

## 2021-04-16 DIAGNOSIS — A41.9 SEPTIC SHOCK (HCC): ICD-10-CM

## 2021-04-16 DIAGNOSIS — B95.61 BACTEREMIA DUE TO METHICILLIN SUSCEPTIBLE STAPHYLOCOCCUS AUREUS (MSSA): ICD-10-CM

## 2021-04-16 DIAGNOSIS — R57.9 SHOCK (HCC): ICD-10-CM

## 2021-04-16 PROBLEM — R94.31 PROLONGED Q-T INTERVAL ON ECG: Status: ACTIVE | Noted: 2021-04-16

## 2021-04-16 PROBLEM — Z86.79 HISTORY OF VASCULITIS: Status: ACTIVE | Noted: 2021-04-16

## 2021-04-16 PROBLEM — D72.9 CSF PLEOCYTOSIS: Status: ACTIVE | Noted: 2021-04-16

## 2021-04-16 PROBLEM — J90 PLEURAL EFFUSION, RIGHT: Status: ACTIVE | Noted: 2021-04-16

## 2021-04-16 PROBLEM — D62 ACUTE BLOOD LOSS ANEMIA: Status: ACTIVE | Noted: 2021-04-16

## 2021-04-16 LAB
ABO GROUP BLD: NORMAL
ANION GAP SERPL CALC-SCNC: 12 MMOL/L (ref 7–16)
ANISOCYTOSIS BLD QL SMEAR: ABNORMAL
BARCODED ABORH UBTYP: 600
BARCODED ABORH UBTYP: 6200
BARCODED ABORH UBTYP: 6200
BARCODED PRD CODE UBPRD: NORMAL
BARCODED UNIT NUM UBUNT: NORMAL
BASOPHILS # BLD AUTO: 0 % (ref 0–1.8)
BASOPHILS # BLD: 0 K/UL (ref 0–0.12)
BLD GP AB SCN SERPL QL: NORMAL
BUN SERPL-MCNC: 12 MG/DL (ref 8–22)
CALCIUM SERPL-MCNC: 7.1 MG/DL (ref 8.5–10.5)
CFT BLD TEG: 11.9 MIN (ref 5–10)
CHLORIDE SERPL-SCNC: 97 MMOL/L (ref 96–112)
CLOT ANGLE BLD TEG: 62.5 DEGREES (ref 53–72)
CLOT LYSIS 30M P MA LENFR BLD TEG: 0 % (ref 0–8)
CO2 SERPL-SCNC: 25 MMOL/L (ref 20–33)
COMPONENT R 8504R: NORMAL
CREAT SERPL-MCNC: 0.75 MG/DL (ref 0.5–1.4)
CT.EXTRINSIC BLD ROTEM: 1.9 MIN (ref 1–3)
EOSINOPHIL # BLD AUTO: 0 K/UL (ref 0–0.51)
EOSINOPHIL NFR BLD: 0 % (ref 0–6.9)
ERYTHROCYTE [DISTWIDTH] IN BLOOD BY AUTOMATED COUNT: 54.8 FL (ref 35.9–50)
GLUCOSE SERPL-MCNC: 121 MG/DL (ref 65–99)
HCT VFR BLD AUTO: 22.5 % (ref 37–47)
HGB BLD-MCNC: 6.4 G/DL (ref 12–16)
HGB BLD-MCNC: 7.6 G/DL (ref 12–16)
HYPOCHROMIA BLD QL SMEAR: ABNORMAL
LACTATE BLD-SCNC: 1.4 MMOL/L (ref 0.5–2)
LYMPHOCYTES # BLD AUTO: 0.7 K/UL (ref 1–4.8)
LYMPHOCYTES NFR BLD: 2.6 % (ref 22–41)
MACROCYTES BLD QL SMEAR: ABNORMAL
MAGNESIUM SERPL-MCNC: 1.6 MG/DL (ref 1.5–2.5)
MANUAL DIFF BLD: NORMAL
MCF BLD TEG: 62.5 MM (ref 50–70)
MCH RBC QN AUTO: 26.1 PG (ref 27–33)
MCHC RBC AUTO-ENTMCNC: 33.8 G/DL (ref 33.6–35)
MCV RBC AUTO: 77.3 FL (ref 81.4–97.8)
METAMYELOCYTES NFR BLD MANUAL: 0.9 %
MICROCYTES BLD QL SMEAR: ABNORMAL
MONOCYTES # BLD AUTO: 1.19 K/UL (ref 0–0.85)
MONOCYTES NFR BLD AUTO: 4.4 % (ref 0–13.4)
MORPHOLOGY BLD-IMP: NORMAL
MYELOCYTES NFR BLD MANUAL: 3.5 %
NEUTROPHILS # BLD AUTO: 24.01 K/UL (ref 2–7.15)
NEUTROPHILS NFR BLD: 87.7 % (ref 44–72)
NEUTS BAND NFR BLD MANUAL: 0.9 % (ref 0–10)
NRBC # BLD AUTO: 0.02 K/UL
NRBC BLD-RTO: 0.1 /100 WBC
PA AA BLD-ACNC: 11.4 %
PA ADP BLD-ACNC: 25 %
PLATELET # BLD AUTO: 114 K/UL (ref 164–446)
PLATELET BLD QL SMEAR: NORMAL
PMV BLD AUTO: 11.5 FL (ref 9–12.9)
POIKILOCYTOSIS BLD QL SMEAR: NORMAL
POTASSIUM SERPL-SCNC: 3.9 MMOL/L (ref 3.6–5.5)
PRODUCT TYPE UPROD: NORMAL
RBC # BLD AUTO: 2.91 M/UL (ref 4.2–5.4)
RBC BLD AUTO: PRESENT
RH BLD: NORMAL
SODIUM SERPL-SCNC: 134 MMOL/L (ref 135–145)
TARGETS BLD QL SMEAR: NORMAL
TEG ALGORITHM TGALG: ABNORMAL
TOXIC GRANULES BLD QL SMEAR: NORMAL
TRIGL SERPL-MCNC: 137 MG/DL (ref 0–149)
UNIT STATUS USTAT: NORMAL
WBC # BLD AUTO: 27.1 K/UL (ref 4.8–10.8)

## 2021-04-16 PROCEDURE — 94002 VENT MGMT INPAT INIT DAY: CPT

## 2021-04-16 PROCEDURE — 87641 MR-STAPH DNA AMP PROBE: CPT

## 2021-04-16 PROCEDURE — 83735 ASSAY OF MAGNESIUM: CPT

## 2021-04-16 PROCEDURE — 5A1955Z RESPIRATORY VENTILATION, GREATER THAN 96 CONSECUTIVE HOURS: ICD-10-PCS | Performed by: INTERNAL MEDICINE

## 2021-04-16 PROCEDURE — 99152 MOD SED SAME PHYS/QHP 5/>YRS: CPT

## 2021-04-16 PROCEDURE — 85007 BL SMEAR W/DIFF WBC COUNT: CPT

## 2021-04-16 PROCEDURE — 700111 HCHG RX REV CODE 636 W/ 250 OVERRIDE (IP): Performed by: INTERNAL MEDICINE

## 2021-04-16 PROCEDURE — 83605 ASSAY OF LACTIC ACID: CPT

## 2021-04-16 PROCEDURE — 99292 CRITICAL CARE ADDL 30 MIN: CPT | Performed by: INTERNAL MEDICINE

## 2021-04-16 PROCEDURE — 85347 COAGULATION TIME ACTIVATED: CPT

## 2021-04-16 PROCEDURE — 85018 HEMOGLOBIN: CPT

## 2021-04-16 PROCEDURE — 94760 N-INVAS EAR/PLS OXIMETRY 1: CPT

## 2021-04-16 PROCEDURE — 82803 BLOOD GASES ANY COMBINATION: CPT

## 2021-04-16 PROCEDURE — 770022 HCHG ROOM/CARE - ICU (200)

## 2021-04-16 PROCEDURE — 94640 AIRWAY INHALATION TREATMENT: CPT

## 2021-04-16 PROCEDURE — 99291 CRITICAL CARE FIRST HOUR: CPT | Performed by: INTERNAL MEDICINE

## 2021-04-16 PROCEDURE — 700105 HCHG RX REV CODE 258: Performed by: INTERNAL MEDICINE

## 2021-04-16 PROCEDURE — 85384 FIBRINOGEN ACTIVITY: CPT

## 2021-04-16 PROCEDURE — 99223 1ST HOSP IP/OBS HIGH 75: CPT | Mod: AI | Performed by: HOSPITALIST

## 2021-04-16 PROCEDURE — 87186 SC STD MICRODIL/AGAR DIL: CPT

## 2021-04-16 PROCEDURE — 87150 DNA/RNA AMPLIFIED PROBE: CPT

## 2021-04-16 PROCEDURE — 86850 RBC ANTIBODY SCREEN: CPT

## 2021-04-16 PROCEDURE — 80048 BASIC METABOLIC PNL TOTAL CA: CPT

## 2021-04-16 PROCEDURE — 87077 CULTURE AEROBIC IDENTIFY: CPT

## 2021-04-16 PROCEDURE — 87040 BLOOD CULTURE FOR BACTERIA: CPT

## 2021-04-16 PROCEDURE — 84478 ASSAY OF TRIGLYCERIDES: CPT

## 2021-04-16 PROCEDURE — 71045 X-RAY EXAM CHEST 1 VIEW: CPT

## 2021-04-16 PROCEDURE — 85027 COMPLETE CBC AUTOMATED: CPT

## 2021-04-16 PROCEDURE — 700101 HCHG RX REV CODE 250: Performed by: INTERNAL MEDICINE

## 2021-04-16 PROCEDURE — 85576 BLOOD PLATELET AGGREGATION: CPT

## 2021-04-16 PROCEDURE — 86901 BLOOD TYPING SEROLOGIC RH(D): CPT

## 2021-04-16 PROCEDURE — 93005 ELECTROCARDIOGRAM TRACING: CPT | Performed by: INTERNAL MEDICINE

## 2021-04-16 PROCEDURE — 86900 BLOOD TYPING SEROLOGIC ABO: CPT

## 2021-04-16 PROCEDURE — 37799 UNLISTED PX VASCULAR SURGERY: CPT

## 2021-04-16 RX ORDER — ACETAMINOPHEN 325 MG/1
650 TABLET ORAL EVERY 4 HOURS PRN
Status: DISCONTINUED | OUTPATIENT
Start: 2021-04-16 | End: 2021-04-18

## 2021-04-16 RX ORDER — NOREPINEPHRINE BITARTRATE 0.03 MG/ML
0-30 INJECTION, SOLUTION INTRAVENOUS CONTINUOUS
Status: DISCONTINUED | OUTPATIENT
Start: 2021-04-16 | End: 2021-04-27

## 2021-04-16 RX ORDER — IPRATROPIUM BROMIDE AND ALBUTEROL SULFATE 2.5; .5 MG/3ML; MG/3ML
3 SOLUTION RESPIRATORY (INHALATION)
Status: DISCONTINUED | OUTPATIENT
Start: 2021-04-16 | End: 2021-04-17

## 2021-04-16 RX ORDER — MAGNESIUM SULFATE HEPTAHYDRATE 40 MG/ML
2 INJECTION, SOLUTION INTRAVENOUS ONCE
Status: COMPLETED | OUTPATIENT
Start: 2021-04-16 | End: 2021-04-16

## 2021-04-16 RX ORDER — SODIUM CHLORIDE, SODIUM LACTATE, POTASSIUM CHLORIDE, CALCIUM CHLORIDE 600; 310; 30; 20 MG/100ML; MG/100ML; MG/100ML; MG/100ML
INJECTION, SOLUTION INTRAVENOUS CONTINUOUS
Status: DISCONTINUED | OUTPATIENT
Start: 2021-04-16 | End: 2021-04-17

## 2021-04-16 RX ORDER — AMOXICILLIN 250 MG
2 CAPSULE ORAL 2 TIMES DAILY
Status: DISCONTINUED | OUTPATIENT
Start: 2021-04-16 | End: 2021-04-18

## 2021-04-16 RX ORDER — POLYETHYLENE GLYCOL 3350 17 G/17G
1 POWDER, FOR SOLUTION ORAL
Status: DISCONTINUED | OUTPATIENT
Start: 2021-04-16 | End: 2021-04-18

## 2021-04-16 RX ORDER — AMOXICILLIN 250 MG
2 CAPSULE ORAL 2 TIMES DAILY
Status: DISCONTINUED | OUTPATIENT
Start: 2021-04-16 | End: 2021-04-16

## 2021-04-16 RX ORDER — BISACODYL 10 MG
10 SUPPOSITORY, RECTAL RECTAL
Status: DISCONTINUED | OUTPATIENT
Start: 2021-04-16 | End: 2021-04-16

## 2021-04-16 RX ORDER — BISACODYL 10 MG
10 SUPPOSITORY, RECTAL RECTAL
Status: DISCONTINUED | OUTPATIENT
Start: 2021-04-16 | End: 2021-04-18

## 2021-04-16 RX ORDER — POLYETHYLENE GLYCOL 3350 17 G/17G
1 POWDER, FOR SOLUTION ORAL
Status: DISCONTINUED | OUTPATIENT
Start: 2021-04-16 | End: 2021-04-16

## 2021-04-16 RX ORDER — IPRATROPIUM BROMIDE AND ALBUTEROL SULFATE 2.5; .5 MG/3ML; MG/3ML
3 SOLUTION RESPIRATORY (INHALATION)
Status: DISCONTINUED | OUTPATIENT
Start: 2021-04-16 | End: 2021-06-16

## 2021-04-16 RX ORDER — NAPROXEN SODIUM 220 MG
440 TABLET ORAL EVERY 12 HOURS PRN
Status: ON HOLD | COMMUNITY
End: 2021-06-17

## 2021-04-16 RX ORDER — FAMOTIDINE 20 MG/1
20 TABLET, FILM COATED ORAL EVERY 12 HOURS
Status: DISCONTINUED | OUTPATIENT
Start: 2021-04-16 | End: 2021-05-12

## 2021-04-16 RX ADMIN — MAGNESIUM SULFATE 2 G: 2 INJECTION INTRAVENOUS at 20:37

## 2021-04-16 RX ADMIN — FAMOTIDINE 20 MG: 10 INJECTION INTRAVENOUS at 18:23

## 2021-04-16 RX ADMIN — FENTANYL CITRATE 50 MCG/HR: 50 INJECTION, SOLUTION INTRAMUSCULAR; INTRAVENOUS at 19:30

## 2021-04-16 RX ADMIN — IPRATROPIUM BROMIDE AND ALBUTEROL SULFATE 3 ML: .5; 2.5 SOLUTION RESPIRATORY (INHALATION) at 22:24

## 2021-04-16 RX ADMIN — NAFCILLIN SODIUM 2 G: 2 INJECTION, POWDER, FOR SOLUTION INTRAMUSCULAR; INTRAVENOUS at 20:35

## 2021-04-16 RX ADMIN — SODIUM CHLORIDE, POTASSIUM CHLORIDE, SODIUM LACTATE AND CALCIUM CHLORIDE: 600; 310; 30; 20 INJECTION, SOLUTION INTRAVENOUS at 20:41

## 2021-04-16 RX ADMIN — PROPOFOL 5 MCG/KG/MIN: 10 INJECTION, EMULSION INTRAVENOUS at 18:23

## 2021-04-16 ASSESSMENT — FIBROSIS 4 INDEX: FIB4 SCORE: 0.89

## 2021-04-16 NOTE — PROGRESS NOTES
[facility] Intensivist TRIAGE OFFICER DIRECT ADMISSION REPORT    Transferring facility: [facility] ICU  Transferring physician: Dr Mack  Transferring facility/physician contact number: [facility]     Chief complaint: Empyema, endocarditis, MSSA bacteremia    Pertinent history & patient course: Patient is a 50-year-old female with a past medical history significant for pseudotumor cerebri status post  shunt by Dr. Oh and port placement for chronic pain management (injects Toradol as an outpatient).  She has had recurrent infections with this port but it has been replaced each time.  She presented to [facility] on 4/11 and was found to have MSSA sepsis secondary to recurrent port infection.  The port was removed on 4/12 by Dr. Candelaria and she was placed on initially vancomycin and Zosyn that was changed to ceftaroline by Nataly LEIGH.  Unfortunately she subsequently developed tricuspid valve endocarditis and her antibiotics were changed to nafcillin 2 days ago.  She has had encephalopathy during her hospitalization and underwent a lumbar puncture on 4/14 showing pleocytosis but negative Gram stain and culture.  Her blood pressure has been stable as has her lactic acid level with medical management.  Given worsening leukocytosis up to 35,000, a CT chest was performed yesterday showing septic emboli in the right greater than left lung with an associated large right effusion.  Interventional radiology was requested to perform a thoracentesis but on ultrasound found multiple loculations and evidence of empyema and did not feel comfortable with that.  This morning her hemoglobin dropped from 8.3 down to 6.9 and patient had increasing work of breathing and was subsequently intubated by the intensivist and a right-sided chest tube was placed with 300 cc of bloody output.  [facility] does not have a thoracic surgeon available at this time to perform thoracotomy and decortication and for  this reason, patient is being transferred to [facility].  Of note, given altered mental status and presence of  shunt, Dr. Oh was consulted on 4/14 and did not recommend removal of shunt and an MRI brain has been ordered but not yet performed prior to transfer.  Patient is currently on a propofol drip and received 1 unit of packed red cell transfusion today as well as 40 mg of IV Lasix.    Pertinent imaging & lab results: White blood cell count 35,000, hemoglobin 6.9 pretransfusion, ABG 7.4 3/44/151, CSF with pleocytosis but culture negative, blood cultures positive for MSSA.  CT chest with right greater than left septic emboli to the parenchyma with a large right effusion.    Code Status: Full code per transferring provider, I personally verified with the transferring provider patient's code status and the transferring provider has confirmed this with the patient.    Further work up or recommendations per triage officer prior to transfer: MRI brain to evaluate for septic emboli, thoracic surgery evaluation    Consultants called prior to transfer and pertinent input from consultants: Dr. Candelaria for port removal, [facility] intensivist for vent management, Dr. Oh regarding  shunt, [facility] infectious disease    Patient accepted for transfer: Yes  Consultants to be called upon arrival: Dr. Benedict with general surgery, [facility] ID, intensivist    Admission status: Inpatient.   Floor requested: Intensive care unit  If ICU transfer, name of intensivist case discussed with and pertinent input from critical care: Dr. Gonda    Please inform the triage officer upon arrival of the patient to [facility] for assignment of a hospitalist to perform admission.     For any question or concerns regarding the care of this patient, please reach out to the assigned hospitalist.

## 2021-04-17 ENCOUNTER — APPOINTMENT (OUTPATIENT)
Dept: RADIOLOGY | Facility: MEDICAL CENTER | Age: 51
DRG: 003 | End: 2021-04-17
Attending: INTERNAL MEDICINE
Payer: MEDICARE

## 2021-04-17 ENCOUNTER — APPOINTMENT (OUTPATIENT)
Dept: RADIOLOGY | Facility: MEDICAL CENTER | Age: 51
DRG: 003 | End: 2021-04-17
Attending: EMERGENCY MEDICINE
Payer: MEDICARE

## 2021-04-17 ENCOUNTER — APPOINTMENT (OUTPATIENT)
Dept: RADIOLOGY | Facility: MEDICAL CENTER | Age: 51
DRG: 003 | End: 2021-04-17
Attending: SURGERY
Payer: MEDICARE

## 2021-04-17 LAB
ANION GAP SERPL CALC-SCNC: 10 MMOL/L (ref 7–16)
ANISOCYTOSIS BLD QL SMEAR: ABNORMAL
BARCODED ABORH UBTYP: 6200
BARCODED ABORH UBTYP: 6200
BARCODED PRD CODE UBPRD: NORMAL
BARCODED PRD CODE UBPRD: NORMAL
BARCODED UNIT NUM UBUNT: NORMAL
BARCODED UNIT NUM UBUNT: NORMAL
BASE EXCESS BLDA CALC-SCNC: 2 MMOL/L (ref -4–3)
BASE EXCESS BLDA CALC-SCNC: 3 MMOL/L (ref -4–3)
BASOPHILS # BLD AUTO: 0 % (ref 0–1.8)
BASOPHILS # BLD: 0 K/UL (ref 0–0.12)
BODY TEMPERATURE: ABNORMAL DEGREES
BODY TEMPERATURE: ABNORMAL DEGREES
BUN SERPL-MCNC: 15 MG/DL (ref 8–22)
CALCIUM SERPL-MCNC: 6.9 MG/DL (ref 8.5–10.5)
CHLORIDE SERPL-SCNC: 96 MMOL/L (ref 96–112)
CO2 BLDA-SCNC: 27 MMOL/L (ref 20–33)
CO2 BLDA-SCNC: 28 MMOL/L (ref 20–33)
CO2 SERPL-SCNC: 28 MMOL/L (ref 20–33)
COMPONENT F 8504F: NORMAL
COMPONENT F 8504F: NORMAL
CREAT SERPL-MCNC: 0.79 MG/DL (ref 0.5–1.4)
EKG IMPRESSION: NORMAL
EOSINOPHIL # BLD AUTO: 0 K/UL (ref 0–0.51)
EOSINOPHIL NFR BLD: 0 % (ref 0–6.9)
ERYTHROCYTE [DISTWIDTH] IN BLOOD BY AUTOMATED COUNT: 54.2 FL (ref 35.9–50)
ERYTHROCYTE [DISTWIDTH] IN BLOOD BY AUTOMATED COUNT: 56.3 FL (ref 35.9–50)
GLUCOSE SERPL-MCNC: 118 MG/DL (ref 65–99)
HCO3 BLDA-SCNC: 26.1 MMOL/L (ref 17–25)
HCO3 BLDA-SCNC: 27 MMOL/L (ref 17–25)
HCT VFR BLD AUTO: 20.6 % (ref 37–47)
HCT VFR BLD AUTO: 22.8 % (ref 37–47)
HGB BLD-MCNC: 7 G/DL (ref 12–16)
HGB BLD-MCNC: 7.4 G/DL (ref 12–16)
HGB BLD-MCNC: 7.8 G/DL (ref 12–16)
HGB BLD-MCNC: 8 G/DL (ref 12–16)
HGB BLD-MCNC: 8.1 G/DL (ref 12–16)
LYMPHOCYTES # BLD AUTO: 0.16 K/UL (ref 1–4.8)
LYMPHOCYTES NFR BLD: 0.9 % (ref 22–41)
MACROCYTES BLD QL SMEAR: ABNORMAL
MAGNESIUM SERPL-MCNC: 2.2 MG/DL (ref 1.5–2.5)
MANUAL DIFF BLD: NORMAL
MCH RBC QN AUTO: 26.8 PG (ref 27–33)
MCH RBC QN AUTO: 27.1 PG (ref 27–33)
MCHC RBC AUTO-ENTMCNC: 34 G/DL (ref 33.6–35)
MCHC RBC AUTO-ENTMCNC: 34.2 G/DL (ref 33.6–35)
MCV RBC AUTO: 78.9 FL (ref 81.4–97.8)
MCV RBC AUTO: 79.2 FL (ref 81.4–97.8)
MICROCYTES BLD QL SMEAR: ABNORMAL
MONOCYTES # BLD AUTO: 1.1 K/UL (ref 0–0.85)
MONOCYTES NFR BLD AUTO: 6.1 % (ref 0–13.4)
MORPHOLOGY BLD-IMP: NORMAL
MRSA DNA SPEC QL NAA+PROBE: NORMAL
MYELOCYTES NFR BLD MANUAL: 1.7 %
NEUTROPHILS # BLD AUTO: 16.43 K/UL (ref 2–7.15)
NEUTROPHILS NFR BLD: 91.3 % (ref 44–72)
NRBC # BLD AUTO: 0 K/UL
NRBC BLD-RTO: 0 /100 WBC
PCO2 BLDA: 38.9 MMHG (ref 26–37)
PCO2 BLDA: 40.1 MMHG (ref 26–37)
PH BLDA: 7.43 [PH] (ref 7.4–7.5)
PH BLDA: 7.44 [PH] (ref 7.4–7.5)
PHOSPHATE SERPL-MCNC: 5.2 MG/DL (ref 2.5–4.5)
PLATELET # BLD AUTO: 74 K/UL (ref 164–446)
PLATELET # BLD AUTO: 80 K/UL (ref 164–446)
PLATELET BLD QL SMEAR: NORMAL
PMV BLD AUTO: 10.4 FL (ref 9–12.9)
PMV BLD AUTO: 10.4 FL (ref 9–12.9)
PO2 BLDA: 70 MMHG (ref 64–87)
PO2 BLDA: 97 MMHG (ref 64–87)
POIKILOCYTOSIS BLD QL SMEAR: NORMAL
POTASSIUM SERPL-SCNC: 3 MMOL/L (ref 3.6–5.5)
PRODUCT TYPE UPROD: NORMAL
PRODUCT TYPE UPROD: NORMAL
RBC # BLD AUTO: 2.61 M/UL (ref 4.2–5.4)
RBC # BLD AUTO: 2.88 M/UL (ref 4.2–5.4)
RBC BLD AUTO: PRESENT
SAO2 % BLDA: 94 % (ref 93–99)
SAO2 % BLDA: 98 % (ref 93–99)
SIGNIFICANT IND 70042: NORMAL
SITE SITE: NORMAL
SODIUM SERPL-SCNC: 134 MMOL/L (ref 135–145)
SOURCE SOURCE: NORMAL
SPECIMEN DRAWN FROM PATIENT: ABNORMAL
SPECIMEN DRAWN FROM PATIENT: ABNORMAL
TARGETS BLD QL SMEAR: NORMAL
TOXIC GRANULES BLD QL SMEAR: NORMAL
UNIT STATUS USTAT: NORMAL
UNIT STATUS USTAT: NORMAL
WBC # BLD AUTO: 16.8 K/UL (ref 4.8–10.8)
WBC # BLD AUTO: 18 K/UL (ref 4.8–10.8)

## 2021-04-17 PROCEDURE — 770022 HCHG ROOM/CARE - ICU (200)

## 2021-04-17 PROCEDURE — P9016 RBC LEUKOCYTES REDUCED: HCPCS | Mod: 91

## 2021-04-17 PROCEDURE — A9270 NON-COVERED ITEM OR SERVICE: HCPCS | Performed by: INTERNAL MEDICINE

## 2021-04-17 PROCEDURE — 37799 UNLISTED PX VASCULAR SURGERY: CPT

## 2021-04-17 PROCEDURE — 0W9930Z DRAINAGE OF RIGHT PLEURAL CAVITY WITH DRAINAGE DEVICE, PERCUTANEOUS APPROACH: ICD-10-PCS | Performed by: SURGERY

## 2021-04-17 PROCEDURE — 700102 HCHG RX REV CODE 250 W/ 637 OVERRIDE(OP): Performed by: INTERNAL MEDICINE

## 2021-04-17 PROCEDURE — 82803 BLOOD GASES ANY COMBINATION: CPT

## 2021-04-17 PROCEDURE — 80048 BASIC METABOLIC PNL TOTAL CA: CPT

## 2021-04-17 PROCEDURE — 700102 HCHG RX REV CODE 250 W/ 637 OVERRIDE(OP): Performed by: HOSPITALIST

## 2021-04-17 PROCEDURE — 84100 ASSAY OF PHOSPHORUS: CPT

## 2021-04-17 PROCEDURE — P9017 PLASMA 1 DONOR FRZ W/IN 8 HR: HCPCS

## 2021-04-17 PROCEDURE — 94003 VENT MGMT INPAT SUBQ DAY: CPT

## 2021-04-17 PROCEDURE — 99292 CRITICAL CARE ADDL 30 MIN: CPT | Performed by: INTERNAL MEDICINE

## 2021-04-17 PROCEDURE — 30233N1 TRANSFUSION OF NONAUTOLOGOUS RED BLOOD CELLS INTO PERIPHERAL VEIN, PERCUTANEOUS APPROACH: ICD-10-PCS | Performed by: STUDENT IN AN ORGANIZED HEALTH CARE EDUCATION/TRAINING PROGRAM

## 2021-04-17 PROCEDURE — 700101 HCHG RX REV CODE 250: Performed by: INTERNAL MEDICINE

## 2021-04-17 PROCEDURE — 71045 X-RAY EXAM CHEST 1 VIEW: CPT

## 2021-04-17 PROCEDURE — 700111 HCHG RX REV CODE 636 W/ 250 OVERRIDE (IP): Performed by: INTERNAL MEDICINE

## 2021-04-17 PROCEDURE — 36430 TRANSFUSION BLD/BLD COMPNT: CPT

## 2021-04-17 PROCEDURE — 36556 INSERT NON-TUNNEL CV CATH: CPT | Performed by: STUDENT IN AN ORGANIZED HEALTH CARE EDUCATION/TRAINING PROGRAM

## 2021-04-17 PROCEDURE — 86923 COMPATIBILITY TEST ELECTRIC: CPT

## 2021-04-17 PROCEDURE — 700105 HCHG RX REV CODE 258: Performed by: EMERGENCY MEDICINE

## 2021-04-17 PROCEDURE — 87040 BLOOD CULTURE FOR BACTERIA: CPT | Mod: 91

## 2021-04-17 PROCEDURE — 700105 HCHG RX REV CODE 258: Performed by: INTERNAL MEDICINE

## 2021-04-17 PROCEDURE — 85027 COMPLETE CBC AUTOMATED: CPT

## 2021-04-17 PROCEDURE — 85018 HEMOGLOBIN: CPT | Mod: 91

## 2021-04-17 PROCEDURE — 700111 HCHG RX REV CODE 636 W/ 250 OVERRIDE (IP): Performed by: EMERGENCY MEDICINE

## 2021-04-17 PROCEDURE — 94640 AIRWAY INHALATION TREATMENT: CPT

## 2021-04-17 PROCEDURE — 32551 INSERTION OF CHEST TUBE: CPT | Performed by: STUDENT IN AN ORGANIZED HEALTH CARE EDUCATION/TRAINING PROGRAM

## 2021-04-17 PROCEDURE — 30233L1 TRANSFUSION OF NONAUTOLOGOUS FRESH PLASMA INTO PERIPHERAL VEIN, PERCUTANEOUS APPROACH: ICD-10-PCS | Performed by: STUDENT IN AN ORGANIZED HEALTH CARE EDUCATION/TRAINING PROGRAM

## 2021-04-17 PROCEDURE — 94799 UNLISTED PULMONARY SVC/PX: CPT

## 2021-04-17 PROCEDURE — 99291 CRITICAL CARE FIRST HOUR: CPT | Performed by: EMERGENCY MEDICINE

## 2021-04-17 PROCEDURE — 93010 ELECTROCARDIOGRAM REPORT: CPT | Performed by: INTERNAL MEDICINE

## 2021-04-17 PROCEDURE — 85007 BL SMEAR W/DIFF WBC COUNT: CPT

## 2021-04-17 PROCEDURE — 83735 ASSAY OF MAGNESIUM: CPT

## 2021-04-17 PROCEDURE — A9270 NON-COVERED ITEM OR SERVICE: HCPCS | Performed by: HOSPITALIST

## 2021-04-17 RX ORDER — POTASSIUM CHLORIDE 29.8 MG/ML
40 INJECTION INTRAVENOUS ONCE
Status: COMPLETED | OUTPATIENT
Start: 2021-04-17 | End: 2021-04-17

## 2021-04-17 RX ORDER — DIVALPROEX SODIUM 125 MG/1
500 CAPSULE, COATED PELLETS ORAL EVERY 8 HOURS
Status: DISCONTINUED | OUTPATIENT
Start: 2021-04-17 | End: 2021-05-19

## 2021-04-17 RX ORDER — RISPERIDONE 1 MG/1
2 TABLET ORAL 2 TIMES DAILY
Status: DISCONTINUED | OUTPATIENT
Start: 2021-04-17 | End: 2021-05-28

## 2021-04-17 RX ORDER — PAROXETINE 10 MG/1
10 TABLET, FILM COATED ORAL EVERY MORNING
Status: DISCONTINUED | OUTPATIENT
Start: 2021-04-17 | End: 2021-05-28

## 2021-04-17 RX ORDER — DULOXETIN HYDROCHLORIDE 60 MG/1
60 CAPSULE, DELAYED RELEASE ORAL 2 TIMES DAILY
Status: DISCONTINUED | OUTPATIENT
Start: 2021-04-17 | End: 2021-05-28

## 2021-04-17 RX ADMIN — PAROXETINE HYDROCHLORIDE 10 MG: 20 TABLET, FILM COATED ORAL at 14:49

## 2021-04-17 RX ADMIN — SODIUM CHLORIDE, POTASSIUM CHLORIDE, SODIUM LACTATE AND CALCIUM CHLORIDE: 600; 310; 30; 20 INJECTION, SOLUTION INTRAVENOUS at 05:11

## 2021-04-17 RX ADMIN — FAMOTIDINE 20 MG: 10 INJECTION INTRAVENOUS at 17:47

## 2021-04-17 RX ADMIN — DIVALPROEX SODIUM 500 MG: 125 CAPSULE ORAL at 14:48

## 2021-04-17 RX ADMIN — ACETAMINOPHEN 650 MG: 325 TABLET, FILM COATED ORAL at 18:34

## 2021-04-17 RX ADMIN — CALCIUM GLUCONATE 2 G: 98 INJECTION, SOLUTION INTRAVENOUS at 06:34

## 2021-04-17 RX ADMIN — NAFCILLIN SODIUM 2 G: 2 INJECTION, POWDER, FOR SOLUTION INTRAMUSCULAR; INTRAVENOUS at 21:52

## 2021-04-17 RX ADMIN — PROPOFOL 55 MCG/KG/MIN: 10 INJECTION, EMULSION INTRAVENOUS at 12:39

## 2021-04-17 RX ADMIN — NAFCILLIN SODIUM 2 G: 2 INJECTION, POWDER, FOR SOLUTION INTRAMUSCULAR; INTRAVENOUS at 15:44

## 2021-04-17 RX ADMIN — PROPOFOL 50 MCG/KG/MIN: 10 INJECTION, EMULSION INTRAVENOUS at 09:11

## 2021-04-17 RX ADMIN — NAFCILLIN SODIUM 2 G: 2 INJECTION, POWDER, FOR SOLUTION INTRAMUSCULAR; INTRAVENOUS at 10:55

## 2021-04-17 RX ADMIN — DOCUSATE SODIUM 50 MG AND SENNOSIDES 8.6 MG 2 TABLET: 8.6; 5 TABLET, FILM COATED ORAL at 17:48

## 2021-04-17 RX ADMIN — PROPOFOL 40 MCG/KG/MIN: 10 INJECTION, EMULSION INTRAVENOUS at 05:11

## 2021-04-17 RX ADMIN — PROPOFOL 55 MCG/KG/MIN: 10 INJECTION, EMULSION INTRAVENOUS at 23:57

## 2021-04-17 RX ADMIN — PROPOFOL 40 MCG/KG/MIN: 10 INJECTION, EMULSION INTRAVENOUS at 00:41

## 2021-04-17 RX ADMIN — VASOPRESSIN 0.03 UNITS/MIN: 20 INJECTION INTRAVENOUS at 21:53

## 2021-04-17 RX ADMIN — DIVALPROEX SODIUM 500 MG: 125 CAPSULE ORAL at 21:52

## 2021-04-17 RX ADMIN — NAFCILLIN SODIUM 2 G: 2 INJECTION, POWDER, FOR SOLUTION INTRAMUSCULAR; INTRAVENOUS at 17:47

## 2021-04-17 RX ADMIN — NAFCILLIN SODIUM 2 G: 2 INJECTION, POWDER, FOR SOLUTION INTRAMUSCULAR; INTRAVENOUS at 00:42

## 2021-04-17 RX ADMIN — RISPERIDONE 2 MG: 1 TABLET, FILM COATED ORAL at 14:49

## 2021-04-17 RX ADMIN — PROPOFOL 45 MCG/KG/MIN: 10 INJECTION, EMULSION INTRAVENOUS at 18:34

## 2021-04-17 RX ADMIN — VASOPRESSIN 0.03 UNITS/MIN: 20 INJECTION INTRAVENOUS at 08:52

## 2021-04-17 RX ADMIN — FENTANYL CITRATE 100 MCG: 50 INJECTION, SOLUTION INTRAMUSCULAR; INTRAVENOUS at 15:04

## 2021-04-17 RX ADMIN — DULOXETINE HYDROCHLORIDE 60 MG: 60 CAPSULE, DELAYED RELEASE ORAL at 14:49

## 2021-04-17 RX ADMIN — IPRATROPIUM BROMIDE AND ALBUTEROL SULFATE 3 ML: .5; 2.5 SOLUTION RESPIRATORY (INHALATION) at 03:26

## 2021-04-17 RX ADMIN — FENTANYL CITRATE 100 MCG: 50 INJECTION, SOLUTION INTRAMUSCULAR; INTRAVENOUS at 05:23

## 2021-04-17 RX ADMIN — PROPOFOL 60 MCG/KG/MIN: 10 INJECTION, EMULSION INTRAVENOUS at 15:44

## 2021-04-17 RX ADMIN — POTASSIUM CHLORIDE 40 MEQ: 29.8 INJECTION, SOLUTION INTRAVENOUS at 06:25

## 2021-04-17 RX ADMIN — NAFCILLIN SODIUM 2 G: 2 INJECTION, POWDER, FOR SOLUTION INTRAMUSCULAR; INTRAVENOUS at 05:23

## 2021-04-17 RX ADMIN — PROPOFOL 65 MCG/KG/MIN: 10 INJECTION, EMULSION INTRAVENOUS at 14:15

## 2021-04-17 RX ADMIN — FAMOTIDINE 20 MG: 10 INJECTION INTRAVENOUS at 05:11

## 2021-04-17 ASSESSMENT — PAIN DESCRIPTION - PAIN TYPE
TYPE: ACUTE PAIN
TYPE: ACUTE PAIN

## 2021-04-17 NOTE — CARE PLAN
Problem: Communication  Goal: The ability to communicate needs accurately and effectively will improve  Outcome: PROGRESSING SLOWER THAN EXPECTED     Problem: Safety  Goal: Will remain free from injury  Outcome: PROGRESSING AS EXPECTED  Goal: Will remain free from falls  Outcome: PROGRESSING AS EXPECTED     Problem: Infection  Goal: Will remain free from infection  Outcome: PROGRESSING SLOWER THAN EXPECTED     Problem: Venous Thromboembolism (VTW)/Deep Vein Thrombosis (DVT) Prevention:  Goal: Patient will participate in Venous Thrombosis (VTE)/Deep Vein Thrombosis (DVT)Prevention Measures  Outcome: PROGRESSING AS EXPECTED     Problem: Bowel/Gastric:  Goal: Normal bowel function is maintained or improved  Outcome: PROGRESSING AS EXPECTED  Goal: Will not experience complications related to bowel motility  Outcome: PROGRESSING AS EXPECTED     Problem: Knowledge Deficit  Goal: Knowledge of disease process/condition, treatment plan, diagnostic tests, and medications will improve  Outcome: PROGRESSING SLOWER THAN EXPECTED  Goal: Knowledge of the prescribed therapeutic regimen will improve  Outcome: PROGRESSING SLOWER THAN EXPECTED     Problem: Discharge Barriers/Planning  Goal: Patient's continuum of care needs will be met  Outcome: PROGRESSING SLOWER THAN EXPECTED     Problem: Fluid Volume:  Goal: Will maintain balanced intake and output  Outcome: PROGRESSING AS EXPECTED     Problem: Safety - Medical Restraint  Goal: Remains free of injury from restraints (Restraint for Interference with Medical Device)  Description: INTERVENTIONS:  1. Determine that other, less restrictive measures have been tried or would not be effective before applying the restraint  2. Evaluate the patient's condition at the time of restraint application  3. Inform patient/family regarding the reason for restraint  4. Q2H: Monitor safety, psychosocial status, comfort, nutrition and hydration  Outcome: PROGRESSING AS EXPECTED  Goal: Free from  restraint(s) (Restraint for Interference with Medical Device)  Description: INTERVENTIONS:  1. ONCE/SHIFT or MINIMUM Q12H: Assess and document the continuing need for restraints  2. Q24H: Continued use of restraint requires LIP to perform face to face examination and written order  3. Identify and implement measures to help patient regain control  Outcome: PROGRESSING AS EXPECTED     Problem: Respiratory:  Goal: Respiratory status will improve  Outcome: PROGRESSING SLOWER THAN EXPECTED     Problem: Skin Integrity  Goal: Risk for impaired skin integrity will decrease  Outcome: PROGRESSING AS EXPECTED     Problem: Pain Management  Goal: Pain level will decrease to patient's comfort goal  Outcome: PROGRESSING AS EXPECTED

## 2021-04-17 NOTE — CONSULTS
Critical Care Consultation    Date of consult: 4/16/2021    Referring Physician  Man Smith M.D.    Reason for Consultation  Empyema with MSSA complicated by respiratory failure and need for mechanical ventilator, patient transferred from [facility] to [facility] for thoracic surgery consultation    History of Presenting Illness  50 y.o. female the past medical history of pseudotumor cerebri status post  shunt by Dr. Oh, chronic pain with history of placement of nerve stimulator, vasculitis, right anterior chest port for home IV meds with recurrent infection who presented 4/11/2021 with to [facility] with recurrent port infection and was initially treated with vancomycin and Zosyn and then changed to ceftaroline and subsequently switched to nafcillin when MSSA was identified.  [facility] infectious disease is managing antibiotics.  Dr. Candelaria removed the port on 4/12.  Imaging suggest tricuspid valve endocarditis.  Lumbar puncture performed 4/14 showed pleocytosis with negative Gram stain and culture so far.  She had worsening leukocytosis with WBC up to 35K and chest CT yesterday revealed septic emboli with greater involvement in the right lung as well as a large right pleural effusion.  IR performed a thoracentesis and placed a small pigtail catheter in the right chest which is now at 20 cm of suction.  There were significant loculations and fluid analysis suggested empyema.  Patient subsequently developed increased work of breathing and was intubated and placed on the ventilator yesterday.  Hemoglobin dropped from 8.3-6.9 as they were considering TPA and dornase via this pigtail catheter and instead they transfused 1 unit of blood.  The plan was to consult thoracic surgery but their only surgeon is out of town till next week.  Dr. Oh consulted on 4/14 and recommended brain MRI and it was ordered but is still not performed (nerve stimulator not MRI compatible?).  Dr. Oh did not recommend removal of shunt at this time  apparently.  The patient's had some hypotension while on the ventilator requiring titration of norepinephrine and vasopressin.  The patient is transferred to [facility] for surgery consultation and Dr. Albert Benedict excepted the patient along with Dr. Gonda on transfer.    Code Status  Full Code    Review of Systems  Review of Systems   Unable to perform ROS: Acuity of condition       Past Medical History   has a past medical history of Allergy, unspecified not elsewhere classified, Anemia (10/05/2017), Anesthesia, Anxiety, Anxiety, generalized, Arthritis, autoimmune, Autoimmune disorder ([facility]), Back pain, Clostridium difficile diarrhea (2014), Depression, Elevated liver enzymes (2017), Fall, H/O Clostridium difficile infection (2014), MRSA infection (2003), Hydrocephalus ([facility]) (2015), Joint pain (09/10/2019), Migraine with aura, with intractable migraine, so stated, without mention of status migrainosus, MRSA (methicillin resistant Staphylococcus aureus) (08/2015), MRSA (methicillin resistant Staphylococcus aureus) (12/2017), MRSA (methicillin resistant Staphylococcus aureus) (2018), Pituitary abnormality (Former[facility]) (2002), Requires supplemental oxygen, Seizure (Formerly [facility]) (started 2017), Staph infection, and Stroke (Formerly [facility]) (2007). She also has no past medical history of Addisons disease ([facility]) or Anginal syndrome ([facility]).    Surgical History   has a past surgical history that includes tonsillectomy (1985); other neurological surg (8580-4860); irrigation & debridement neuro (N/A, 6/28/2015); lumbar exploration (N/A, 8/31/2015); spinal cord stimulator (12/19/2016);  shunt insertion (5/31/2017); liver biopsy (07/24/2017); cholecystectomy (2001); appendectomy (1982); spinal cord stimulator (05/24/2018); spinal cord stimulator (2003); knee arthroscopy (Right, 1990); full thickness skin graft (Left, 04/2018); other surgical procedure (11/10/2017); other neurological surg (08/2015); endometrial ablation (2001); tubal coagulation laparoscopic  bilateral (Bilateral, 2001); pr implant neurostim/ (9/13/2019); and other (Left, 01/23/2020).    Family History  family history includes Alcohol abuse in her father; Diabetes in her father; Mult Sclerosis in her mother.    Social History   reports that she has never smoked. She has never used smokeless tobacco. She reports previous alcohol use. She reports that she does not use drugs.    Medications  Home Medications     Reviewed by Matthew Barrow (Pharmacy Tech) on 04/16/21 at 1753  Med List Status: Complete   Medication Last Dose Status   AIMOVIG 70 MG/ML Solution Auto-injector April 2021 Active   aspirin 81 MG EC tablet 4/11/2021 Active   diphenhydrAMINE (BENADRYL) 50 MG/ML Solution 4/11/2021 Active   divalproex ER (DEPAKOTE ER) 250 MG TABLET SR 24 HR 4/8/2021 Active   DULoxetine (CYMBALTA) 60 MG Cap DR Particles delayed-release capsule 4/11/2021 Active   ketorolac (TORADOL) 60 MG/2ML Solution 4/11/2021 Active   levETIRAcetam (KEPPRA) 750 MG tablet NOT TAKING Active   Multiple Vitamins-Minerals (WOMENS MULTIVITAMIN) Tab 4/11/2021 Active   naproxen (ANAPROX) 220 MG tablet 4/11/2021 Active   PARoxetine (PAXIL) 10 MG Tab 4/11/2021 Active   risperiDONE (RISPERDAL) 2 MG Tab 4/11/2021 Active   sodium chloride (SALT) 1 GM Tab New medication Active   Ubrogepant (UBRELVY) 100 MG Tab 4/11/2021 Active              Current Facility-Administered Medications   Medication Dose Route Frequency Provider Last Rate Last Admin   • Respiratory Therapy Consult   Nebulization Continuous RT Man Wahl M.D.       • famotidine (PEPCID) tablet 20 mg  20 mg Enteral Tube Q12HRS Man Wahl M.D.        Or   • famotidine (PEPCID) injection 20 mg  20 mg Intravenous Q12HRS Man Wahl M.D.   20 mg at 04/16/21 1823   • MD Alert...ICU Electrolyte Replacement per Pharmacy   Other PHARMACY TO DOSE Man Wahl M.D.       • lidocaine (XYLOCAINE) 1 % injection 1-2 mL  1-2 mL Tracheal Tube Q30 MIN PRN Man ORANTES  "ALFRED Wahl       • ipratropium-albuterol (DUONEB) nebulizer solution  3 mL Nebulization Q4HRS (RT) Man Wahl M.D.       • ipratropium-albuterol (DUONEB) nebulizer solution  3 mL Nebulization Q2HRS PRN (RT) Man Wahl M.D.       • fentaNYL (SUBLIMAZE) injection 25 mcg  25 mcg Intravenous Q HOUR PRN Man Wahl M.D.        Or   • fentaNYL (SUBLIMAZE) injection 50 mcg  50 mcg Intravenous Q HOUR PRN Man Wahl M.D.        Or   • fentaNYL (SUBLIMAZE) injection 100 mcg  100 mcg Intravenous Q HOUR PRN Man Wahl M.D.       • fentaNYL (SUBLIMAZE) 50 mcg/mL in 50mL   Intravenous Continuous Man Wahl M.D.       • propofol (DIPRIVAN) injection  0-80 mcg/kg/min Intravenous Continuous Man Wahl M.D. 2.3 mL/hr at 04/16/21 1823 5 mcg/kg/min at 04/16/21 1823   • norepinephrine (Levophed) infusion 8 mg/250 mL (premix)  0-30 mcg/min Intravenous Continuous Man Wahl M.D. 3.8 mL/hr at 04/16/21 1911 2 mcg/min at 04/16/21 1911   • vasopressin (VASOSTRICT) 20 Units in  mL Infusion  0.03 Units/min Intravenous Continuous Man Wahl M.D.   Stopped at 04/16/21 1830   • senna-docusate (PERICOLACE or SENOKOT S) 8.6-50 MG per tablet 2 tablet  2 tablet Oral BID Ana Garcia M.D.   Stopped at 04/16/21 1845    And   • polyethylene glycol/lytes (MIRALAX) PACKET 1 Packet  1 Packet Oral QDAY PRN Ana Garcia M.D.        And   • magnesium hydroxide (MILK OF MAGNESIA) suspension 30 mL  30 mL Oral QDAY PRN Ana Garcia M.D.        And   • bisacodyl (DULCOLAX) suppository 10 mg  10 mg Rectal QDAY PRN Ana Garcia M.D.       • nafcillin 2 g in dextrose 5% 100 mL IVPB  2 g Intravenous Q4HR Man Wahl M.D.       • magnesium sulfate IVPB premix 2 g  2 g Intravenous Once Man Whal M.D.           Allergies  Allergies   Allergen Reactions   • Sumatriptan Anaphylaxis     Historical=\"Heart stops.\" Reaction  between 1995 to 1997   • Prochlorperazine Rash " and Swelling     Tongue swelling. Reaction as a teen. (compazine)  Tolerated Phenergan on 02/24/15   • Vancomycin Shortness of Breath and Rash     Reaction in 2005.  D/W patient 8/31/15 - tolerated loading dose of vancomycin administered on 8/30/15 with some itching to chest with decreased infusion rate. Red Man Syndrome.  2018-tolerated slow drip with benadryl pre-med-had no problems.       Vital Signs last 24 hours  Temp:  [36.3 °C (97.3 °F)] 36.3 °C (97.3 °F)  Pulse:  [60-93] 75  Resp:  [20-26] 20  BP: (104-148)/(56-78) 119/67  SpO2:  [96 %-98 %] 96 %    Physical Exam  Physical Exam  Vitals reviewed.   Constitutional:       Appearance: She is normal weight. She is ill-appearing. She is not toxic-appearing.      Interventions: She is sedated, intubated and restrained.   HENT:      Head: Normocephalic and atraumatic.      Comments: Purple hair     Nose:      Comments: NG tube     Mouth/Throat:      Mouth: Mucous membranes are moist.   Eyes:      General: No scleral icterus.     Pupils: Pupils are equal, round, and reactive to light.   Neck:      Vascular: No JVD.   Cardiovascular:      Rate and Rhythm: Normal rate and regular rhythm.      Pulses: Normal pulses.      Heart sounds: No gallop.    Pulmonary:      Effort: She is intubated.      Breath sounds: Rhonchi and rales present. No wheezing.   Chest:      Comments: Right-sided small bore chest tube without obvious air leak, dressing right anterior chest from site of catheter removal, no significant drainage  Abdominal:      General: Bowel sounds are normal. There is no distension.      Palpations: Abdomen is soft.      Tenderness: There is no guarding.   Genitourinary:     Comments: Lopez catheter  Musculoskeletal:      Cervical back: Neck supple. No rigidity.      Right lower leg: No edema.      Left lower leg: No edema.      Comments: Right radial arterial line   Lymphadenopathy:      Cervical: No cervical adenopathy.   Skin:     General: Skin is warm and dry.       Capillary Refill: Capillary refill takes less than 2 seconds.      Coloration: Skin is not cyanotic or mottled.      Nails: There is no clubbing.   Neurological:      General: No focal deficit present.      Mental Status: She is lethargic.      Comments: Sedate on the ventilator, intermittently agitated and moving all 4 extremities, brainstem reflexes intact   Psychiatric:      Comments: Unable to assess         Fluids    Intake/Output Summary (Last 24 hours) at 2021 194  Last data filed at 2021 1830  Gross per 24 hour   Intake --   Output 0 ml   Net 0 ml       Laboratory  Recent Results (from the past 48 hour(s))   EKG    Collection Time: 21  6:22 PM   Result Value Ref Range    Report       [facility]    Test Date:  2021  Pt Name:    BRIGID DAWSON                 Department: 161  MRN:        [id number]                      Room:       21  Gender:     Female                       Technician: DWIGHT  :        1970                   Requested By:ELEONORA SHUKLA  Order #:    [id number]                    Reading MD:    Measurements  Intervals                                Axis  Rate:       78                           P:          33  IA:         119                          QRS:        -4  QRSD:       97                           T:          -38  QT:         381  QTc:        434    Interpretive Statements  SINUS RHYTHM  BORDERLINE SHORT IA INTERVAL  BORDERLINE T ABNORMALITIES, INFERIOR LEADS  Compared to ECG 2021 13:22:28  T-wave abnormality now present  Sinus tachycardia no longer present  Ventricular premature complex(es) no longer present  Aberrant conduction of supraventricular beat(s) no longer present  Intraventricular conduction delay no longer  present     Triglycerides Starting now and then Every 3 Days    Collection Time: 21  6:40 PM   Result Value Ref Range    Triglycerides 137 0 - 149 mg/dL   CBC WITH DIFFERENTIAL    Collection Time: 21  6:40 PM    Result Value Ref Range    WBC 27.1 (H) 4.8 - 10.8 K/uL    RBC 2.91 (L) 4.20 - 5.40 M/uL    Hemoglobin 7.6 (L) 12.0 - 16.0 g/dL    Hematocrit 22.5 (L) 37.0 - 47.0 %    MCV 77.3 (L) 81.4 - 97.8 fL    MCH 26.1 (L) 27.0 - 33.0 pg    MCHC 33.8 33.6 - 35.0 g/dL    RDW 54.8 (H) 35.9 - 50.0 fL    Platelet Count 114 (L) 164 - 446 K/uL    MPV 11.5 9.0 - 12.9 fL    Neutrophils-Polys 87.70 (H) 44.00 - 72.00 %    Lymphocytes 2.60 (L) 22.00 - 41.00 %    Monocytes 4.40 0.00 - 13.40 %    Eosinophils 0.00 0.00 - 6.90 %    Basophils 0.00 0.00 - 1.80 %    Nucleated RBC 0.10 /100 WBC    Neutrophils (Absolute) 24.01 (H) 2.00 - 7.15 K/uL    Lymphs (Absolute) 0.70 (L) 1.00 - 4.80 K/uL    Monos (Absolute) 1.19 (H) 0.00 - 0.85 K/uL    Eos (Absolute) 0.00 0.00 - 0.51 K/uL    Baso (Absolute) 0.00 0.00 - 0.12 K/uL    NRBC (Absolute) 0.02 K/uL    Hypochromia 1+     Anisocytosis 1+     Macrocytosis 1+     Microcytosis 1+    Basic Metabolic Panel    Collection Time: 04/16/21  6:40 PM   Result Value Ref Range    Sodium 134 (L) 135 - 145 mmol/L    Potassium 3.9 3.6 - 5.5 mmol/L    Chloride 97 96 - 112 mmol/L    Co2 25 20 - 33 mmol/L    Glucose 121 (H) 65 - 99 mg/dL    Bun 12 8 - 22 mg/dL    Creatinine 0.75 0.50 - 1.40 mg/dL    Calcium 7.1 (L) 8.5 - 10.5 mg/dL    Anion Gap 12.0 7.0 - 16.0   MAGNESIUM    Collection Time: 04/16/21  6:40 PM   Result Value Ref Range    Magnesium 1.6 1.5 - 2.5 mg/dL   LACTIC ACID    Collection Time: 04/16/21  6:40 PM   Result Value Ref Range    Lactic Acid 1.4 0.5 - 2.0 mmol/L   ESTIMATED GFR    Collection Time: 04/16/21  6:40 PM   Result Value Ref Range    GFR If African American >60 >60 mL/min/1.73 m 2    GFR If Non African American >60 >60 mL/min/1.73 m 2   DIFFERENTIAL MANUAL    Collection Time: 04/16/21  6:40 PM   Result Value Ref Range    Bands-Stabs 0.90 0.00 - 10.00 %    Metamyelocytes 0.90 %    Myelocytes 3.50 %    Manual Diff Status PERFORMED    PERIPHERAL SMEAR REVIEW    Collection Time: 04/16/21  6:40 PM    Result Value Ref Range    Peripheral Smear Review see below    PLATELET ESTIMATE    Collection Time: 04/16/21  6:40 PM   Result Value Ref Range    Plt Estimation Decreased    MORPHOLOGY    Collection Time: 04/16/21  6:40 PM   Result Value Ref Range    RBC Morphology Present     Poikilocytosis 1+     Target Cells 1+     Toxic Gran Moderate        Imaging  DX-CHEST-PORTABLE (1 VIEW)   Final Result         No significant interval change.          Assessment/Plan  * Sepsis (HCC)  Assessment & Plan  This is Septic shock Present on admission  SIRS criteria identified on my evaluation include: Tachypnea, with respirations greater than 20 per minute and Leukocytosis, with WBC greater than 12,000  Source is right anterior chest port, MSSA  Suspected onset of infection unknown  Patient developed hypotension at transferring facility [facility], resuscitation complete there, currently titrating norepinephrine/vasopressin  Additional fluid resuscitation with crystalloid given per sepsis guidelines, the patient remains hypotensive with systolic blood pressure less than 90 or MAP less than 65  Hemodynamic support with additional fluids and IV vasopressors as needed to maintain a SBP of 90 or MAP of 65  IV antibiotics as appropriate for source of sepsis  Reassessment: I have reassessed the patient's hemodynamic status    Initially treated with vancomycin/Zosyn and then changed to ceftaroline by ID, Nataly infectious disease  MSSA port infection and endocarditis/right-sided empyema identified, antibiotics changed to nafcillin by ID  Right anterior chest port removed 4/12  Still has indwelling  shunt, neurosurgery evaluation by Dr elkins at [facility]  Transferred for point source control i.e. decortication, surgery consult pending  Dropped hemoglobin and required transfusion, TPA/dornase via chest tube not given secondary to this problem  MRI brain pending  Actively titrating norepinephrine and vasopressin infusing      Pleural effusion,  right -concern of empyema/hemothorax  Assessment & Plan  Right-sided effusion associated with infected catheter right anterior chest and septic emboli, possible empyema  Status post chest tube placement 4/16  At [facility] complicated by some bleeding  Alteplase dornase considered via chest tube but held secondary to drop in hemoglobin requiring transfusion  Pleural studies reveal marked elevated white count as well as RBC count and primarily segs/bands  Continue suction to right-sided small bore chest tube  Serial chest x-rays  Thoracic surgical consultation in a.m., Dr. Albert Benedict aware  Reviewed CT images from [facility], may need follow-up CT   Low up on pleural cultures from [facility] next few days      Right-sided acute bacterial endocarditis  Assessment & Plan  Cultures growing MSSA at [facility]  Infected right anterior chest port/line removed  CTA chest 4/15 with multiple septic emboli worse on the right complicated by large right effusion with loculations  Echocardiogram 4/12 reveals echogenic mobile lesion on tricuspid valve RVSP 30, EF 55%  ID managing antibiotics  Monitor for the need for cardiology consultation and MICAH    Acute respiratory failure with hypoxia (HCC)  Assessment & Plan  Intubated 4/15 for respiratory failure at [facility]  RT protocols  Ventilator bundle  Serial ABG/chest x-ray/ventilator mechanics review  SAT/SBT as clinically prudent  Currently sedated with propofol and dexmedetomidine on transfer  Add fentanyl for analgesia and sedation and wean off dexmedetomidine at this time    Port or reservoir infection  Assessment & Plan  Current venous access port infection right anterior chest  Status post removal again of port 4/12 by Dr. Candelaria  MSSA positive cultures with right-sided endocarditis and septic emboli to lung complicated by right-sided empyema    Acute encephalopathy- (present on admission)  Assessment & Plan  Multifactorial including septic and metabolic  Status post lumbar puncture with clear  cytosis but negative Gram stain and negative culture so far  History of  shunt for pseudotumor cerebri  Neurosurgery follow-up recommended keeping shunt for now and recommended obtaining MRI which is pending  Request neurosurgery follow-up in a.m. hopefully after MRI obtained    Pneumonia  Assessment & Plan  Transferred with a diagnosis pneumonia and x-ray is indeed abnormal  Likely septic emboli from endocarditis complicated by empyema  Monitor for the development of ARDS  Compliance and oxygenation acceptable at this time  Antibiotics per ID, MSSA growing in blood  Covid negative    Bacteremia due to methicillin susceptible Staphylococcus aureus (MSSA)  Assessment & Plan  Source presumed right anterior chest line/port  Recurrent line/port infection  Blood cultures positive for MSSA  CT imaging suggestive of empyema and septic emboli presumably due to right-sided endocarditis versus just infected line  Repeat blood cultures x2 continue to repeat every 48 hours until negative  Nafcillin 2 g IV every 4  Nataly infectious disease following  Echocardiogram  May need MICAH    CSF pleocytosis  Assessment & Plan  Status post lumbar puncture 4/13  WBC 53, predominantly travails  Glucose 47, protein 97 Gram stain negative and culture negative so far  Neurosurgery aware,  shunt still in place  MRI brain requested by neurosurgery at [facility] but not performed as of yet  Has nerve stimulator, MRI compatible?    Acute blood loss anemia  Assessment & Plan  Hemoglobin dropped from 8.3->6.4 on 4/16, repeat hemoglobin pending  Status post transfusion 1 unit of blood 4/16 at [facility]  Holding heparin prophylaxis  Sequentials  Serial CBC  Transfuse per protocols    Thrombocytopenia (HCC)- (present on admission)  Assessment & Plan  Recurrent, severe at 54K  Serial CBC  Transfuse per protocols, if bleeds obtain TEG with mapping  No heparin for now  Sequentials    S/P  shunt- (present on admission)  Assessment & Plan  History of pseudotumor  cerebri  History of  shunt by Dr. Oh  MRI brain pending, ordered 4/14 by Dr Oh not performed yet  History of neurostimulator for chronic pain, unknown if this is MRI compatible  Per transfer notes Dr. Oh did not want to remove the  shunt at this time   4/14 lumbar puncture did reveal pleocytosis but Gram stain negative and culture negative so far  Follow-up on CSF cultures, ID following    History of vasculitis  Assessment & Plan  Monitor    Prolonged Q-T interval on ECG  Assessment & Plan  Avoid QT prolonging agents as able  Optimize electrolytes  Cardiac monitoring  EKG    History of complicated migraines- (present on admission)  Assessment & Plan  Resume home regimen as clinically appropriate including Aimovig    Hyponatremia- (present on admission)  Assessment & Plan  Initial sodium 124 at [facility]  Improved to 134 4/16  Serial BMP    H/O: CVA (cerebrovascular accident)- (present on admission)  Assessment & Plan  Monitor  PT/OT as needed  MRI brain pending    Chronic pain syndrome- (present on admission)  Assessment & Plan  Chronic back pain  History of nerve stimulator, MRI compatible?  Resume home regimen as clinically appropriate  Analgesia with caution as clinically needed  PT eval and treat as needed    Discussed patient condition and risk of morbidity and/or mortality with day and night shift RN, RT, Pharmacy and Charge RN.      The patient remains critically ill.  Critical care time = 95 minutes in directly providing and coordinating critical care and extensive data review.  No time overlap and excludes procedures.

## 2021-04-17 NOTE — PROCEDURES
Operative Report    Date: 4/17/2021    Surgeon: Albert Benedict M.D.     Assistant: None    Pre-operative Diagnosis: Right empyema    Post-operative Diagnosis: Same    Procedure: Placement of Right chest tube      Indications: This is a 50 y.o. female who presented with symptoms of a right empyema transferred for higher level of care.    The indications for a surgical assistant in this surgery were indicated due to complexity of the procedure. Their role included aiding in incision, retraction, holding devices including camera for laparoscopic procedure, and closure of the wound.      Findings: appropriate placement of right chest tube confirmed by chest x-ray    Wound Classification: Class IV, IV, Dirty or Infected. Infection present at the time of surgery (PATOS)..    Procedure in detail: The patient was examined in the intensive care unit.  Small pigtail catheter in place.  Given patient's critical condition plan for emergent consent and placement of right chest tube.  Patient's right chest was prepped with chlorhexidine scrub and draped in normal sterile fashion.  We infused local anesthetic over the proposed area of incision near the previous chest tube just inside skin combination blunt and sharp dissection was used to reach the chest wall we then bluntly entered the rib space and then did a finger sweep inside the thoracic cavity ensure no adhesions were in place.  The 36 Slovak chest tube was advanced in the chest cavity without any resistance.  It was secured in place with an 0 Ethibond suture and 0 Ethibond horizontal mattress sutures placed around the chest tube and secured with Steri-Strips.  The chest tube was dressed with petroleum jelly 4 x 4's and tape.    Sponge and needle counts were correct at the end of the case.     A confirmatory chest x-ray was taken and the chest tube is in good position    Specimen: None    EBL: 10 mL    Dispo: Remains intubated in critical condition in the ICU    Albert Benedict  M.D.  [facility]  [phone]

## 2021-04-17 NOTE — PROGRESS NOTES
Patient settled into CIC room T621 at 1745. Patient admitted with Right IJ central line, Right radial arterial line, nevarez, NGT, ETT, PIVx1.   Dr. Barney and Dr. Benedict notified of patient's arrival to unit.

## 2021-04-17 NOTE — PROGRESS NOTES
12 hour chart check performed at bedside    Monitor Summary  SR   HR 60-85  0.16/0.10/0.34    Vagal response during ET suctioning to HR 51

## 2021-04-17 NOTE — ASSESSMENT & PLAN NOTE
Patient s/p multiple chest tubes, thoracotomy and decortication  Now s/p second decortication 5/17  Per thoracic surgery note, sutures to be removed on 6/9  Plan Abx's through 6/14     IR for draining tube placed with 10 cc bloody fluid draining out, sent to lab for analysis, low likelihood of infection vs high likelihood of post surgical hemothorax.

## 2021-04-17 NOTE — ASSESSMENT & PLAN NOTE
Reorient and maintain sleep/wake cycle, mobilize  Limit sedative when able, attempting to reduce narcotic burden, patient requesting more narcotics regularly  Discontinuing IV narcotics  Attempt to slowly wean some of her other oral sedating agents as pain and anxiety allow  Difficult to treat anxiety and acute/chronic pain  Improved overall, nearing baseline?

## 2021-04-17 NOTE — H&P
Hospital Medicine History & Physical Note    Date of Service  4/16/2021    Primary Care Physician  Elliott Power M.D.    Consultants  Critical care  Nataly LEIGH  General Surgery    Code Status  Full Code    Chief Complaint  No chief complaint on file.      History of Presenting Illness  Patient intubated so history obtained from chart review.    50 y.o. female with PMHx pseudotumor cerebri s/p  shunt and port placement for chronic pain management which has been complicated by recurrent port infections and subsequent replacements who was transferred from [facility] where she had been admitted on 4/11 for MSSA bacteremia secondary to recurrent port infection.  Her port was removed on 4/12 and her antibiotics were being managed by Nataly LEIGH.  Patient unfortunately developed tricuspid valve endocarditis and her antibiotics were changed to nafcillin on 4/14.  Her hospitalization was further complicated by encephalopathy and she underwent LP on 4/14 which was not significant.  Dr. Oh (neurosurgery) was consulted at this time and did not recommend removal of shunt.  She underwent CT chest on 4/15 which showed septic emboli in the right greater than left lung with associated large right effusion.  On ultrasound this effusion had multiple loculations and evidence of empyema.  This morning patient's hemoglobin dropped from 8.3 down to 6.9 for which she was given 1 unit PRBCs.  She also developed respiratory distress requiring intubation.  Chest tube was placed with 300 cc of bloody output.  Patient was transferred to [facility] for evaluation by thoracic surgery.      Review of Systems  Review of Systems   Unable to perform ROS: Intubated       Past Medical History   has a past medical history of Allergy, unspecified not elsewhere classified, Anemia (10/05/2017), Anesthesia, Anxiety, Anxiety, generalized, Arthritis, autoimmune, Autoimmune disorder (HCC), Back pain, Clostridium difficile diarrhea  "(2014), Depression, Elevated liver enzymes (2017), Fall, H/O Clostridium difficile infection (2014), MRSA infection (2003), Hydrocephalus ([facility]) (2015), Joint pain (09/10/2019), Migraine with aura, with intractable migraine, so stated, without mention of status migrainosus, MRSA (methicillin resistant Staphylococcus aureus) (08/2015), MRSA (methicillin resistant Staphylococcus aureus) (12/2017), MRSA (methicillin resistant Staphylococcus aureus) (2018), Pituitary abnormality ([facility]) (2002), Requires supplemental oxygen, Seizure ([facility]) (started 2017), Staph infection, and Stroke ([facility]) (2007).    Surgical History   has a past surgical history that includes tonsillectomy (1985); other neurological surg (5918-1030); irrigation & debridement neuro (N/A, 6/28/2015); lumbar exploration (N/A, 8/31/2015); spinal cord stimulator (12/19/2016);  shunt insertion (5/31/2017); liver biopsy (07/24/2017); cholecystectomy (2001); appendectomy (1982); spinal cord stimulator (05/24/2018); spinal cord stimulator (2003); knee arthroscopy (Right, 1990); full thickness skin graft (Left, 04/2018); other surgical procedure (11/10/2017); other neurological surg (08/2015); endometrial ablation (2001); tubal coagulation laparoscopic bilateral (Bilateral, 2001); pr implant neurostim/ (9/13/2019); and other (Left, 01/23/2020).     Family History  family history includes Alcohol abuse in her father; Diabetes in her father; Mult Sclerosis in her mother.     Social History   reports that she has never smoked. She has never used smokeless tobacco. She reports previous alcohol use. She reports that she does not use drugs.    Allergies  Allergies   Allergen Reactions   • Sumatriptan Anaphylaxis     Historical=\"Heart stops.\" Reaction  between 1995 to 1997   • Prochlorperazine Rash and Swelling     Tongue swelling. Reaction as a teen. (compazine)  Tolerated Phenergan on 02/24/15   • Vancomycin Shortness of Breath and Rash     Reaction in 2005.  D/W " "patient 8/31/15 - tolerated loading dose of vancomycin administered on 8/30/15 with some itching to chest with decreased infusion rate. Red Man Syndrome.  2018-tolerated slow drip with benadryl pre-med-had no problems.       Medications  Prior to Admission Medications   Prescriptions Last Dose Informant Patient Reported? Taking?   AIMOVIG 70 MG/ML Solution Auto-injector April 2021 at \"Beginning of month\" Significant Other Yes No   Sig: Inject 70 mg under the skin Q30 DAYS.   DULoxetine (CYMBALTA) 60 MG Cap DR Particles delayed-release capsule 4/11/2021 at Afternoon Significant Other Yes No   Sig: Take 60 mg by mouth 2 times a day.   Multiple Vitamins-Minerals (WOMENS MULTIVITAMIN) Tab 4/11/2021 at AM Significant Other Yes Yes   Sig: Take 1 tablet by mouth 2 times a day. \"Equate Women's Multivitamin/Multimineral\"   PARoxetine (PAXIL) 10 MG Tab 4/11/2021 at AM Significant Other Yes No   Sig: Take 10 mg by mouth every morning.   Ubrogepant (UBRELVY) 100 MG Tab 4/11/2021 at Afternoon Significant Other Yes No   Sig: Take 100 mg by mouth 2 times a day as needed (Migraine). May take a 2nd dose after 4 hours if migraine persists. Max of 2 tablets in 24 hours.   aspirin 81 MG EC tablet 4/11/2021 at AM Significant Other Yes No   Sig: Take 81 mg by mouth every morning.   diphenhydrAMINE (BENADRYL) 50 MG/ML Solution 4/11/2021 at Afternoon Significant Other Yes No   Sig: Inject 75 mg into the shoulder, thigh, or buttocks 3 times a day as needed (Migraine).   divalproex ER (DEPAKOTE ER) 250 MG TABLET SR 24 HR 4/8/2021 at Unknown time Significant Other Yes No   Sig: Take 500 mg by mouth 3 times a day. 2 tablets = 500 mg.   ketorolac (TORADOL) 60 MG/2ML Solution 4/11/2021 at Afternoon Significant Other Yes No   Sig: Inject 60 mg into the shoulder, thigh, or buttocks 3 times a day as needed (Migraine).   levETIRAcetam (KEPPRA) 750 MG tablet NOT TAKING at NOT TAKING Significant Other No No   Sig: Take 1 Tab by mouth 2 Times a Day. "   Patient not taking: Reported on 4/8/2021   naproxen (ANAPROX) 220 MG tablet 4/11/2021 at Afternoon Significant Other Yes Yes   Sig: Take 440 mg by mouth every 12 hours as needed (Migraine). 2 tablets = 440 mg.   risperiDONE (RISPERDAL) 2 MG Tab 4/11/2021 at AM Significant Other Yes No   Sig: Take 2 mg by mouth 2 times a day.   sodium chloride (SALT) 1 GM Tab New medication at NOT STARTED Significant Other No No   Sig: Take 1 tablet by mouth every day.   Patient taking differently: Take 1 g by mouth every day. New medication NOT STARTED.      Facility-Administered Medications: None       Physical Exam  Temp:  [36.3 °C (97.3 °F)] 36.3 °C (97.3 °F)  Pulse:  [60-93] 75  Resp:  [20-26] 20  BP: (104-148)/(56-78) 119/67  SpO2:  [96 %-98 %] 96 %    Physical Exam  Vitals and nursing note reviewed.   Constitutional:       Comments: Intubated and sedated   HENT:      Head: Normocephalic and atraumatic.      Right Ear: External ear normal.      Left Ear: External ear normal.      Nose: Nose normal.      Mouth/Throat:      Comments: ET tube in place  Eyes:      General:         Right eye: No discharge.         Left eye: No discharge.   Cardiovascular:      Rate and Rhythm: Normal rate and regular rhythm.   Pulmonary:      Effort: No respiratory distress.      Breath sounds: No wheezing.      Comments: Intubated, chest tube in place  Abdominal:      General: Abdomen is flat. Bowel sounds are normal.      Palpations: Abdomen is soft.   Musculoskeletal:      Cervical back: Normal range of motion and neck supple.      Right lower leg: No edema.      Left lower leg: No edema.   Skin:     General: Skin is warm and dry.   Neurological:      Comments: Intubated and sedated         Laboratory:  Recent Labs     04/16/21  1840   WBC 27.1*   RBC 2.91*   HEMOGLOBIN 7.6*   HEMATOCRIT 22.5*   MCV 77.3*   MCH 26.1*   MCHC 33.8   RDW 54.8*   PLATELETCT 114*   MPV 11.5     Recent Labs     04/16/21  1840   SODIUM 134*   POTASSIUM 3.9   CHLORIDE  97   CO2 25   GLUCOSE 121*   BUN 12   CREATININE 0.75   CALCIUM 7.1*     Recent Labs     04/16/21  1840   GLUCOSE 121*         No results for input(s): NTPROBNP in the last 72 hours.  Recent Labs     04/16/21  1840   TRIGLYCERIDE 137     No results for input(s): TROPONINT in the last 72 hours.    Imaging:  DX-CHEST-PORTABLE (1 VIEW)   Final Result         No significant interval change.      MR-BRAIN-W/O    (Results Pending)   DX-CHEST-PORTABLE (1 VIEW)    (Results Pending)         Assessment/Plan:  I anticipate this patient will require at least two midnights for appropriate medical management, necessitating inpatient admission.    Pleural effusion, right -concern of empyema/hemothorax  Assessment & Plan  Chest tube placed at [facility] on 4/16  Surgery consulted    Right-sided acute bacterial endocarditis  Assessment & Plan  Diagnosed at [facility]    Acute respiratory failure with hypoxia (HCC)  Assessment & Plan  Secondary to septic emboli, hemothorax, and possible empyema  Patient currently intubated    Acute encephalopathy- (present on admission)  Assessment & Plan  Per neurosurgery no need to remove  shunt  LP done at [facility] unremarkable  MRI brain pending    Bacteremia due to methicillin susceptible Staphylococcus aureus (MSSA)  Assessment & Plan  Continue nafcillin    Acute blood loss anemia  Assessment & Plan  Likely secondary to hemothorax  300 cc of blood removed after chest tube placed at [facility]  Trend hemoglobin

## 2021-04-17 NOTE — CARE PLAN
Problem: Ventilation Defect:  Goal: Ability to achieve and maintain unassisted ventilation or tolerate decreased levels of ventilator support  Outcome: NOT MET   Vent day 2  16/320/+8, 50%  Do not use PEEP ladder or perform SAT/ SBT per MD

## 2021-04-17 NOTE — CARE PLAN
Problem: Safety - Medical Restraint  Goal: Remains free of injury from restraints (Restraint for Interference with Medical Device)  Description: INTERVENTIONS:  1. Determine that other, less restrictive measures have been tried or would not be effective before applying the restraint  2. Evaluate the patient's condition at the time of restraint application  3. Inform patient/family regarding the reason for restraint  4. Q2H: Monitor safety, psychosocial status, comfort, nutrition and hydration  Outcome: PROGRESSING AS EXPECTED  Flowsheets (Taken 4/16/2021 2246)  Addressed this shift: Remains free of injury from restraints (restraint for interference with medical device):   Determine that other, less restrictive measures have been tried or would not be effective before applying the restraint   Evaluate the patient's condition at the time of restraint application   Inform patient/family regarding the reason for restraint   Every 2 hours: Monitor safety, psychosocial status, comfort, nutrition and hydration  Goal: Free from restraint(s) (Restraint for Interference with Medical Device)  Description: INTERVENTIONS:  1. ONCE/SHIFT or MINIMUM Q12H: Assess and document the continuing need for restraints  2. Q24H: Continued use of restraint requires LIP to perform face to face examination and written order  3. Identify and implement measures to help patient regain control  Outcome: PROGRESSING AS EXPECTED  Flowsheets (Taken 4/16/2021 2246)  Addressed this shift: Free from restraint(s) (restraint for interference with medical device):   ONCE/SHIFT or MINIMUM Every 12 hours: Assess and document the continuing need for restraints   Every 24 hours: Continued use of restraint requires Licensed Independent Practitioner to perform face to face examination and written order   Identify and implement measures to help patient regain control     Problem: Pain Management  Goal: Pain level will decrease to patient's comfort  goal  Outcome: PROGRESSING AS EXPECTED

## 2021-04-17 NOTE — PROGRESS NOTES
Med rec completed per Pt's spouse via phone ([phone]). Pt's spouse reviewed Pt's medication bottles.  Allergies reviewed with Pt's spouse.  In addition to medications listed, Pt's spouse reports that Pt uses a kratom powder, 2 oz twice per day as needed for migraine; last used Friday 4/9/2021.  No oral antibiotics in the last 14 days.  Pt takes ASPIRIN.

## 2021-04-17 NOTE — ASSESSMENT & PLAN NOTE
Source presumed right anterior chest line/port with endocarditis  Multiple sources for MSSA including MV/TV vegetation, port, spinal stimulator/ shunt  Status post port removal at [facility]  Last positive blood cultures were 4/17  Status post treatment with nafcillin from 4/16-4/23  ID following, continue meropenem 7days then back to Ancef 5/26 with last day Ancef 6/14 per ID

## 2021-04-17 NOTE — ASSESSMENT & PLAN NOTE
4/15 Intubated for acute hypoxic respiratory failure at [facility] due to hydro/pneumo, trapped lung, lung abscesses. 5/1 s/p percutaneous tracheostomy  Place back on ventilator for peep and recruitment and rest with her acute shock 5/19 gas exchange good  5/21 SBT trials monitor for de-recruitment on CXR  Bronchoscopy 5/22 was benign  T piece trials 5/23, off the ventilator since a.m. 5/24  PSMV trials ongoing, voice better  Chest tubes placed to waterseal 5/25 and removed 5/26  Follow-up chest x-ray as needed   mobilize/pulmonary toilet  Monitor for readiness for trach capping trials

## 2021-04-17 NOTE — ASSESSMENT & PLAN NOTE
4/17 Upsized pigtail to 36 [place] chest tube, serosanguineous fluid removed no active bleeding noted.  We will continue with drainage with appropriate sized chest tube and reassess in a.m.    4/20 Day #3 intrapleural fibrinolytic

## 2021-04-17 NOTE — CONSULTS
DATE OF CONSULTATION:  4/17/2021     REFERRING PHYSICIAN:   Elliott Salvador M.D.     CONSULTING PHYSICIAN:  Albert Benedict M.D.     REASON FOR CONSULTATION:  Right empyema.   I have been asked by Dr. Salvador to see the patient in surgical consultation for evaluation of right empyema.    HISTORY OF PRESENT ILLNESS: The patient is a 50 year-old White woman who was transferred to [facility] for higher level of care with a right empyema.  She is complex and significant past medical history including multiple infections endocarditis of the tricuspid valve history of a  shunt and now concern for empyema likely from septic emboli.  General surgery is consulted to help manage her empyema.  She is currently intubated and sedated and the majority of this information was taken from the chart    PAST MEDICAL HISTORY:  has a past medical history of Allergy, unspecified not elsewhere classified, Anemia (10/05/2017), Anesthesia, Anxiety, Anxiety, generalized, Arthritis, autoimmune, Autoimmune disorder ([facility]), Back pain, Clostridium difficile diarrhea (2014), Depression, Elevated liver enzymes (2017), Fall, H/O Clostridium difficile infection (2014), MRSA infection (2003), Hydrocephalus ([facility]) (2015), Joint pain (09/10/2019), Migraine with aura, with intractable migraine, so stated, without mention of status migrainosus, MRSA (methicillin resistant Staphylococcus aureus) (08/2015), MRSA (methicillin resistant Staphylococcus aureus) (12/2017), MRSA (methicillin resistant Staphylococcus aureus) (2018), Pituitary abnormality ([facility]) (2002), Requires supplemental oxygen, Seizure ([facility]) (started 2017), Staph infection, and Stroke ([facility]) (2007).    PAST SURGICAL HISTORY:  has a past surgical history that includes tonsillectomy (1985); other neurological surg (8150-1895); irrigation & debridement neuro (N/A, 6/28/2015); lumbar exploration (N/A, 8/31/2015); spinal cord stimulator (12/19/2016);  shunt insertion  "(5/31/2017); liver biopsy (07/24/2017); cholecystectomy (2001); appendectomy (1982); spinal cord stimulator (05/24/2018); spinal cord stimulator (2003); knee arthroscopy (Right, 1990); full thickness skin graft (Left, 04/2018); other surgical procedure (11/10/2017); other neurological surg (08/2015); endometrial ablation (2001); tubal coagulation laparoscopic bilateral (Bilateral, 2001); implant neurostim/ (9/13/2019); and other (Left, 01/23/2020).     ALLERGIES:   Allergies   Allergen Reactions   • Sumatriptan Anaphylaxis     Historical=\"Heart stops.\" Reaction  between 1995 to 1997   • Prochlorperazine Rash and Swelling     Tongue swelling. Reaction as a teen. (compazine)  Tolerated Phenergan on 02/24/15   • Vancomycin Shortness of Breath and Rash     Reaction in 2005.  D/W patient 8/31/15 - tolerated loading dose of vancomycin administered on 8/30/15 with some itching to chest with decreased infusion rate. Red Man Syndrome.  2018-tolerated slow drip with benadryl pre-med-had no problems.        CURRENT MEDICATIONS:   Home Medications     Reviewed by Matthew Barrow (Pharmacy Tech) on 04/16/21 at 1753  Med List Status: Complete   Medication Last Dose Status   AIMOVIG 70 MG/ML Solution Auto-injector April 2021 Active   aspirin 81 MG EC tablet 4/11/2021 Active   diphenhydrAMINE (BENADRYL) 50 MG/ML Solution 4/11/2021 Active   divalproex ER (DEPAKOTE ER) 250 MG TABLET SR 24 HR 4/8/2021 Active   DULoxetine (CYMBALTA) 60 MG Cap DR Particles delayed-release capsule 4/11/2021 Active   ketorolac (TORADOL) 60 MG/2ML Solution 4/11/2021 Active   levETIRAcetam (KEPPRA) 750 MG tablet NOT TAKING Active   Multiple Vitamins-Minerals (WOMENS MULTIVITAMIN) Tab 4/11/2021 Active   naproxen (ANAPROX) 220 MG tablet 4/11/2021 Active   PARoxetine (PAXIL) 10 MG Tab 4/11/2021 Active   risperiDONE (RISPERDAL) 2 MG Tab 4/11/2021 Active   sodium chloride (SALT) 1 GM Tab New medication Active   Ubrogepant (UBRELVY) 100 MG Tab " 4/11/2021 Active                FAMILY HISTORY:   Family History   Problem Relation Age of Onset   • Mult Sclerosis Mother    • Diabetes Father    • Alcohol abuse Father        SOCIAL HISTORY:   Social History     Tobacco Use   • Smoking status: Never Smoker   • Smokeless tobacco: Never Used   Substance and Sexual Activity   • Alcohol use: Not Currently   • Drug use: Never   • Sexual activity: Not on file       REVIEW OF SYSTEMS: Comprehensive review of systems is not able to be elicited from the patient secondary to the acuity of the clinical situation.     PHYSICAL EXAMINATION:   General Appearance: The patient is a Intubated and sedated woman On a vent.  VITAL SIGNS: /60   Pulse 79   Temp 37.2 °C (99 °F)   Resp 16   Wt 75 kg (165 lb 5.5 oz)   SpO2 99%   HEAD AND NECK: Demonstrates symmetric, reactive pupils. Extraocular muscles   are intact. Nares and oropharynx are clear.   NECK: Supple. No adenopathy.  CHEST:    Inspection: Mechanically ventilated.   Palpation:  The chest is nontender.    Right chest tube in place with serosanguineous output  CARDIOVASCULAR:   Inspection: The skin is warm and dry.  Auscultation: Sinus tachycardia.   Peripheral Pulses: Normal.    ABDOMEN:   Inspection: Abdominal inspection reveals no abrasions, contusions, lacerations or penetrating wounds.   Palpation: Palpation is remarkable for no significant tenderness, guarding, or peritoneal findings. No abdominal wall hernias.  EXTREMITIES:   Examination of the upper and lower extremities demonstrates No cyanosis, edema, or clubbing of the nails.  NEUROLOGIC:   Neurologic examination reveals intubated and sedated.  PSYCHIATRIC:   Cannot be assessed.    LABORATORY VALUES:   Recent Labs     04/16/21  1840 04/16/21  2030 04/17/21  0041 04/17/21  0518 04/17/21  0815   WBC 27.1*  --   --  18.0* 16.8*   RBC 2.91*  --   --  2.61* 2.88*   HEMOGLOBIN 7.6*   < > 7.4* 7.0* 7.8*   HEMATOCRIT 22.5*  --   --  20.6* 22.8*   MCV 77.3*  --   --   78.9* 79.2*   MCH 26.1*  --   --  26.8* 27.1   MCHC 33.8  --   --  34.0 34.2   RDW 54.8*  --   --  56.3* 54.2*   PLATELETCT 114*  --   --  74* 80*   MPV 11.5  --   --  10.4 10.4    < > = values in this interval not displayed.     Recent Labs     04/16/21  1840 04/17/21  0518   SODIUM 134* 134*   POTASSIUM 3.9 3.0*   CHLORIDE 97 96   CO2 25 28   GLUCOSE 121* 118*   BUN 12 15   CREATININE 0.75 0.79   CALCIUM 7.1* 6.9*                IMAGING:   DX-CHEST-PORTABLE (1 VIEW)   Final Result         1.  New chest tube is noted in the right lower hemithorax.      2.  Right pleural effusion is again identified which appears similar to the prior exam.      3.  No new infiltrates or consolidations are identified.      DX-CHEST-PORTABLE (1 VIEW)   Final Result         1.  Pulmonary edema and/or infiltrates are identified, which are stable since the prior exam.   2.  Moderate loculated appearing right pleural effusion      DX-CHEST-PORTABLE (1 VIEW)   Final Result         No significant interval change.      MR-BRAIN-W/O    (Results Pending)   DX-ABDOMEN FOR TUBE PLACEMENT    (Results Pending)       IMPRESSION AND PLAN:  Pleural effusion, right -concern of empyema/hemothorax  Assessment & Plan  Upsized pigtail to 36 Yi chest tube, serosanguineous fluid removed no active bleeding noted.  We will continue with drainage with appropriate sized chest tube and reassess in a.m.  Likely able to proceed with TPA dornase at that point.        ACS NSQIP Surgical Risk Calculator       ____________________________________     Albert Benedict M.D.    DD: 4/17/2021  11:21 AM

## 2021-04-17 NOTE — ASSESSMENT & PLAN NOTE
Likely secondary to hemothorax  300 cc of blood removed after chest tube placed at [facility]  Hb has been stable around 9-10

## 2021-04-17 NOTE — ASSESSMENT & PLAN NOTE
History of pseudotumor cerebri  History of  shunt by Dr. Oh  Could be seeded by MSSA, Dr. Oh consulted at [facility], recommended MRI but unable given her spinal stimulator

## 2021-04-17 NOTE — ASSESSMENT & PLAN NOTE
Chronic back pain and intractable headaches  History of nerve stimulator   Dr Vargas consulted at admission, no plan to remove stimulator due to her clinical instability  Multimodal with scheduled oxycodone, gabapentin, methadone (increased dose 5/11)  Hopefully reduce some of her sedating agents her chest tubes removed and she becomes more mobile  Patient lobbying hard for IV narcotics, will continue them at low-dose through chest tube removal 5/26, tubes pulled and IV Dilaudid discontinued midday 5/26  On methadone with breakthrough enteral oxycodone  Monitor for the need for pain specialist consultation short-term and long-term

## 2021-04-17 NOTE — ASSESSMENT & PLAN NOTE
MSSA bacteremia at OSH. First negative blood cultures were on 4/17  Infected port removed at [facility]   shunt and spinal stimulator could be seeded, NSGY has consulted on both and don't recommend removal at admission due to HD instability.   MV and TV vegetations with septic pulmonary emboli  Nataly ID following, antibiotics escalated to cefepime->Merrem for VAP and Klebsiella on prior pleural fluid  Back to Ancef 5/26 per ID, last dose 6/14 per ID  Follow-up cultures from decortication 5/17 -> none taken  Follow-up echocardiogram and follow-up appointment with cards and/or [facility] in  Late June?

## 2021-04-17 NOTE — PROGRESS NOTES
Dr. Wahl at bedside to discuss POC with RN and RT. Labs ordered including ABG. MD updated that patient with no urine output from nevarez. Orders for CVP set up, maintenance IVF. Also notified MD that bladder temp reading 38.9. Orders for tylenol via NG received. Dr. Wahl also discussed with this RN and Shankar RT that patient is not to have a sedation vacation or SAT/SBT in the morning due to scheduled surgery and respiratory fragility. All members of IDT in agreement with POC

## 2021-04-17 NOTE — ASSESSMENT & PLAN NOTE
R empyema due to septic pulmonary emboli  4/16 Chest tube placement at [facility]  4/18 Upsized by Dr Benedict  4/18-4/20 Received TPA/dornase.  Stopped b/c Hgb dropped requiring transfusion  4/26 was discontinued due to stopped functioning   4/28 s/p right VATS and decortication. Cx grew of Klebsiella pneumonia  5/17 total right lung decortication for organized empyema, right chest tubes x2  Output for several days has been 0 to 20 cc  Chest x-ray improving  Chest tubes x2 removed 5/26 by Dr. Vasquez  Follow-up chest x-ray as needed

## 2021-04-17 NOTE — PROGRESS NOTES
Infectious Disease Daily Progress Note    Date of Service  4/17/2021    Chief Complain  Nafcillin 2g Q4 hrs 4/15-present    Previous: Unasyn, Zosyn, Vanco, Daptomycin  Ceftaroline: start 4/13-4/15    4/14: Unable to give name of previous NSG or neurologist. Seems confused, but able to say some sentences fluently.   4/15: Line cultures now growing MSSA. As well as from the blood. Repeat blood culture 4/13+ in both sets. Patient has worsening confusion. CSF fluid analyses suggest pleocytosis. Cultures are no growth from the CSF. Chest CT was ordered this morning indicating a loculated right pleural effusion as well as bilateral septic emboli. Dr. Mack spoke with Dr. Oh yesterday and no surgical intervention unless clinical deterioration.  4/16: Increased respiratory distress.  Tachypneic.  CT with loculated collection.  Dr Resendiz not available.  No thoracic surgeon available.  Plans to have pulmonary place CT.  Transferring to ICU.  4/17: Transferred to [facility] last night. Hgb dropped to 6.4 requiring PRBC transfusion. Requiring 2 pressors. Fio2 50%. Febrile 38.8. Pending OR decortication of empyema and hemothorax. Chest tube placed at [facility] shows 8,371 WBC, ,000, 74% N          Review of Systems  Review of Systems   Unable to perform ROS: Acuity of condition        Physical Exam  Temp:  [36.3 °C (97.3 °F)-38.8 °C (101.8 °F)] 37.2 °C (99 °F)  Pulse:  [60-93] 82  Resp:  [15-26] 24  BP: ()/(35-78) 103/45  SpO2:  [95 %-100 %] 97 %    Physical Exam  Constitutional:       Comments: Intubated sedated   HENT:      Head: Normocephalic.      Comments: ET tube  Cardiovascular:      Rate and Rhythm: Normal rate and regular rhythm.      Heart sounds: No murmur.   Pulmonary:      Comments: Diminished R side  Abdominal:      General: Abdomen is flat. Bowel sounds are normal. There is no distension.   Musculoskeletal:         General: No swelling or deformity.   Skin:     Comments: R chest tube          Fluids    Intake/Output Summary (Last 24 hours) at 4/17/2021 0658  Last data filed at 4/17/2021 0600  Gross per 24 hour   Intake 1960.94 ml   Output 525 ml   Net 1435.94 ml       Laboratory  Recent Labs     04/16/21  1840 04/16/21  1840 04/16/21 2030 04/17/21  0041 04/17/21  0518   WBC 27.1*  --   --   --  18.0*   RBC 2.91*  --   --   --  2.61*   HEMOGLOBIN 7.6*   < > 6.4* 7.4* 7.0*   HEMATOCRIT 22.5*  --   --   --  20.6*   MCV 77.3*  --   --   --  78.9*   MCH 26.1*  --   --   --  26.8*   MCHC 33.8  --   --   --  34.0   RDW 54.8*  --   --   --  56.3*   PLATELETCT 114*  --   --   --  74*   MPV 11.5  --   --   --  10.4    < > = values in this interval not displayed.     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  -Acute right loculated pleural effusion/empyema  -Acute septic emboli bilaterally  -Acute bilateral pneumonia due to above  -Pseudotumor cerebri with underlying  shunt  -Chronic migraine headaches  -History of autoimmune vasculitis  -Elevated alkaline phosphatase  -Acute encephalopathy due to sepsis staphylococcal, meningitis ruled out   - hemothorax R   - anemia due to above     Plan   Patient has evidence of disseminated staphylococcal sepsis source from her line with infection to her lungs, tricuspid valve and now has R empyema requiring transfer to [facility] for decortication due to lack of CT surgery support    - Continue Nafcillin for CNS penetration for now. Initial CSF does not show ventriculitis, but cannot rule out septic brain emboli and brain abscess  - initial CSF cultures  has pleocytosis, but no culture growth  - pending decortication of empyema  - thoracentesis and pleural fluid consistent with empyema and hemothorax  - blood culture from 4/14 at [facility] still positive. Will repeat BC  - has retained R central line. Will need to be replaced once stable  - Will need MRI brain to rule out septic emboli/brain abscess/  shunt infection  - Continue to follow CSF and pleural cultures  - original infected port removed 4/12  - Neurosurgery Dr Oh was notified and no surgical intervention for  shunt  - vent management per pulmonology      45 min spent with 50% face to face care and critical care time involvement  Thank you for this consult

## 2021-04-17 NOTE — ASSESSMENT & PLAN NOTE
MSSA septic emboli with empyema   BAL 4/20 klebsiella pneumonia  BAL 4/24 still heavy growth of klebsiella  4/28 OR pleural fluid specimen growing klebsiella.   Continue antibiotics per ID -> meropenem for 7 days and then back to Ancef, last day 6/14  Worsening infiltrates and sputum on right lung place on ventilator -> benign on bronchoscopy for pna 5/22  ABX per ID

## 2021-04-17 NOTE — PROGRESS NOTES
2 RN skin check complete.   Devices in place: ETT with roger, NG left nare, PIV, art line, CVC, bilateral wrist restraints, nevarez, SCDs, cardiac monitor, BP cuff, SpO2 probe.  Skin assessed under devices and intact. Small scabs on lips and mouth frequently bleeding but no sores noted upon assessment. Bilateral upper arms with bruising medially.   The following interventions in place: ETT repositioned Q2 hours per RN and RT, mepilex to sacrum, waffle overlay, Q2 hour turns, pillows floating all extremities

## 2021-04-17 NOTE — ASSESSMENT & PLAN NOTE
Recurrent venous access port infection R ant-chest, likely initial infection triggering entire admission to [facility]/Transfer to [facility]  Status post removal again of port 4/12 by Dr. Candelaria  MSSA positive cultures with right-sided endocarditis and septic emboli to lung complicated by right-sided empyema

## 2021-04-17 NOTE — PROGRESS NOTES
Critical Care Event Note    Called to the bedside for decreased hemoglobin to 6.4 and abnormal TEG.  No overt signs of bleeding, but known right hemothorax who is planned to go to the OR in the AM for decortication.    Acute blood loss anemia:  - HGB 6.4  - Transfuse 1 U PRBC for HGB < 7.0  - Correct coagulopathy with FFP 2 units  - Post transfusion HGB 7.4 --> 7.0  - Transfuse an additional 1 U PRBC    Thrombocytopenia:  - Platelets 217 --> 114 --> 74  - Transfuse for platelets < 50    Hypokalemia:  - K 3.0  - 40 mEq KCl    Hypocalcemia:  - Ca 6.9  - 2 grams calcium gluconate    Patient is critically ill.  The critical that has been undertaken is medically complex.  There has been no overlap in critical care time.  Critical Care Time not including procedures: 65 minutes

## 2021-04-17 NOTE — PROGRESS NOTES
"Critical Care Progress Note    Date of admission  4/16/2021    Chief Complaint  \"50 y.o. female the past medical history of pseudotumor cerebri status post  shunt by Dr. Oh, chronic pain with history of placement of nerve stimulator, vasculitis, right anterior chest port for home IV meds with recurrent infection who presented 4/11/2021 with to [facility] with recurrent port infection and was initially treated with vancomycin and Zosyn and then changed to ceftaroline and subsequently switched to nafcillin when MSSA was identified.  [facility] infectious disease is managing antibiotics.  Dr. Candelaria removed the port on 4/12.  Imaging suggest tricuspid valve endocarditis.  Lumbar puncture performed 4/14 showed pleocytosis with negative Gram stain and culture so far.  She had worsening leukocytosis with WBC up to 35K and chest CT yesterday revealed septic emboli with greater involvement in the right lung as well as a large right pleural effusion.  IR performed a thoracentesis and placed a small pigtail catheter in the right chest which is now at 20 cm of suction.  There were significant loculations and fluid analysis suggested empyema.  Patient subsequently developed increased work of breathing and was intubated and placed on the ventilator yesterday.  Hemoglobin dropped from 8.3-6.9 as they were considering TPA and dornase via this pigtail catheter and instead they transfused 1 unit of blood.  The plan was to consult thoracic surgery but their only surgeon is out of town till next week.  Dr. Oh consulted on 4/14 and recommended brain MRI and it was ordered but is still not performed (nerve stimulator not MRI compatible?).  Dr. Oh did not recommend removal of shunt at this time apparently.  The patient's had some hypotension while on the ventilator requiring titration of norepinephrine and vasopressin.  The patient is transferred to [facility] for surgery consultation and Dr. Albert Benedict excepted the patient along with  " "[place] on transfer.\"    Hospital Course  4/16 admitted to ICU  4/17 VD 2, 2 FFP, pRBC overnight; new chest tube per gen surg    Interval Problem Update  Reviewed last 24 hour events:  Critically ill  VD 2 PEEP 8/45%  Propofol + fentanyl  SR  Tmax 101.8    MAP > 65 on NE  No VTE ppx  H2 blocker  Nafcillin     Review of Systems  Review of Systems   Unable to perform ROS: Intubated        Vital Signs for last 24 hours   Temp:  [36.3 °C (97.3 °F)-38.8 °C (101.8 °F)] 37.2 °C (99 °F)  Pulse:  [60-93] 83  Resp:  [0-26] 20  BP: ()/(35-78) 104/58  SpO2:  [95 %-100 %] 95 %    Hemodynamic parameters for last 24 hours  CVP:  [8 MM HG-300 MM HG] 300 MM HG    Respiratory Information for the last 24 hours  Vent Mode: APVCMV  Rate (breaths/min): 20  Vt Target (mL): 320  PEEP/CPAP: 8  MAP: 14  Control VTE (exp VT): 320    Physical Exam   Physical Exam  Vitals and nursing note reviewed.   Constitutional:       Appearance: She is ill-appearing.   HENT:      Head: Normocephalic and atraumatic.      Right Ear: External ear normal.      Left Ear: External ear normal.      Nose: Nose normal.      Mouth/Throat:      Mouth: Mucous membranes are moist.   Eyes:      Pupils: Pupils are equal, round, and reactive to light.   Cardiovascular:      Rate and Rhythm: Normal rate and regular rhythm.      Pulses: Normal pulses.      Heart sounds: Murmur present. No gallop.       Comments: R sided chest tube  Pulmonary:      Comments: Equal and symmetric breath sounds to mechanical ventilation  Abdominal:      General: Abdomen is flat. There is no distension.      Palpations: Abdomen is soft.      Tenderness: There is no abdominal tenderness. There is no guarding or rebound.   Musculoskeletal:      Cervical back: No rigidity.      Right lower leg: No edema.      Left lower leg: No edema.   Lymphadenopathy:      Cervical: No cervical adenopathy.   Skin:     General: Skin is warm and dry.      Capillary Refill: Capillary refill takes less " than 2 seconds.   Neurological:      Comments: Grimaces, moves all 4 extremities         Medications  Current Facility-Administered Medications   Medication Dose Route Frequency Provider Last Rate Last Admin   • divalproex (DEPAKOTE SPRINKLE) capsule 500 mg  500 mg Enteral Tube Q8HRS Elliott Salvador M.D.   500 mg at 04/17/21 1448   • DULoxetine (CYMBALTA) capsule 60 mg  60 mg Enteral Tube BID Elliott Salvador M.D.   60 mg at 04/17/21 1449   • PARoxetine (PAXIL) tablet 10 mg  10 mg Enteral Tube QAM Elliott Salvador M.D.   10 mg at 04/17/21 1449   • risperiDONE (RISPERDAL) tablet 2 mg  2 mg Enteral Tube BID Elliott Salvador M.D.   2 mg at 04/17/21 1449   • Respiratory Therapy Consult   Nebulization Continuous RT Man Wahl M.D.       • famotidine (PEPCID) tablet 20 mg  20 mg Enteral Tube Q12HRS Man Wahl M.D.        Or   • famotidine (PEPCID) injection 20 mg  20 mg Intravenous Q12HRS Man Wahl M.D.   20 mg at 04/17/21 0511   • MD Alert...ICU Electrolyte Replacement per Pharmacy   Other PHARMACY TO DOSE Man Wahl M.D.       • lidocaine (XYLOCAINE) 1 % injection 1-2 mL  1-2 mL Tracheal Tube Q30 MIN PRN Man Wahl M.D.       • ipratropium-albuterol (DUONEB) nebulizer solution  3 mL Nebulization Q2HRS PRN (RT) Man Wahl M.D.       • fentaNYL (SUBLIMAZE) injection 25 mcg  25 mcg Intravenous Q HOUR PRN Man Wahl M.D.        Or   • fentaNYL (SUBLIMAZE) injection 50 mcg  50 mcg Intravenous Q HOUR PRN Man Wahl M.D.        Or   • fentaNYL (SUBLIMAZE) injection 100 mcg  100 mcg Intravenous Q HOUR PRN Man Wahl M.D.   100 mcg at 04/17/21 0523   • fentaNYL (SUBLIMAZE) 50 mcg/mL in 50mL   Intravenous Continuous Man Wahl M.D. 2 mL/hr at 04/17/21 0312 100 mcg at 04/17/21 1504   • propofol (DIPRIVAN) injection  0-80 mcg/kg/min Intravenous Continuous Man Wahl M.D. 27 mL/hr at 04/17/21 1544 60 mcg/kg/min at 04/17/21 1544   •  norepinephrine (Levophed) infusion 8 mg/250 mL (premix)  0-30 mcg/min Intravenous Continuous Man Wahl M.D. 5.6 mL/hr at 04/17/21 0200 3 mcg/min at 04/17/21 0200   • vasopressin (VASOSTRICT) 20 Units in  mL Infusion  0.03 Units/min Intravenous Continuous Man Wahl M.D. 9 mL/hr at 04/17/21 0852 0.03 Units/min at 04/17/21 0852   • senna-docusate (PERICOLACE or SENOKOT S) 8.6-50 MG per tablet 2 tablet  2 tablet Oral BID Ana Garcia M.D.   Stopped at 04/16/21 1845    And   • polyethylene glycol/lytes (MIRALAX) PACKET 1 Packet  1 Packet Oral QDAY PRN Ana Garcia M.D.        And   • magnesium hydroxide (MILK OF MAGNESIA) suspension 30 mL  30 mL Oral QDAY PRN Ana Garcia M.D.        And   • bisacodyl (DULCOLAX) suppository 10 mg  10 mg Rectal QDAY PRN Ana Garcia M.D.       • nafcillin 2 g in dextrose 5% 100 mL IVPB  2 g Intravenous Q4HR Man Wahl M.D. 200 mL/hr at 04/17/21 1544 2 g at 04/17/21 1544   • acetaminophen (Tylenol) tablet 650 mg  650 mg Oral Q4HRS PRN Man Wahl M.D.           Fluids    Intake/Output Summary (Last 24 hours) at 4/17/2021 1603  Last data filed at 4/17/2021 1400  Gross per 24 hour   Intake 3113.2 ml   Output 1356 ml   Net 1757.2 ml       Laboratory  Recent Labs     04/16/21 2029 04/17/21  0326   ISTATAPH 7.435 7.437   ISTATAPCO2 38.9* 40.1*   ISTATAPO2 97* 70   ISTATATCO2 27 28   SORXUPP6LUM 98 94   ISTATARTHCO3 26.1* 27.0*   ISTATARTBE 2 3   ISTATTEMP see below see below   ISTATSPEC Arterial Arterial         Recent Labs     04/16/21 1840 04/17/21  0518   SODIUM 134* 134*   POTASSIUM 3.9 3.0*   CHLORIDE 97 96   CO2 25 28   BUN 12 15   CREATININE 0.75 0.79   MAGNESIUM 1.6 2.2   PHOSPHORUS  --  5.2*   CALCIUM 7.1* 6.9*     Recent Labs     04/16/21 1840 04/17/21  0518   GLUCOSE 121* 118*     Recent Labs     04/16/21 1840 04/17/21  0518 04/17/21  0815   WBC 27.1* 18.0* 16.8*   NEUTSPOLYS 87.70* 91.30*  --    LYMPHOCYTES 2.60* 0.90*  --     MONOCYTES 4.40 6.10  --    EOSINOPHILS 0.00 0.00  --    BASOPHILS 0.00 0.00  --      Recent Labs     04/16/21  1840 04/16/21  2030 04/17/21  0518 04/17/21  0815 04/17/21  1300   RBC 2.91*  --  2.61* 2.88*  --    HEMOGLOBIN 7.6*   < > 7.0* 7.8* 8.0*   HEMATOCRIT 22.5*  --  20.6* 22.8*  --    PLATELETCT 114*  --  74* 80*  --     < > = values in this interval not displayed.       Imaging  X-Ray:  I have personally reviewed the images and compared with prior images.    Assessment/Plan  * Sepsis (HCC)  Assessment & Plan  This is Septic shock Present on admission  SIRS criteria identified on my evaluation include: Tachypnea, with respirations greater than 20 per minute and Leukocytosis, with WBC greater than 12,000  Source is right anterior chest port, MSSA  Suspected onset of infection unknown  Patient developed hypotension at transferring facility [facility], resuscitation complete there, currently titrating norepinephrine/vasopressin  Additional fluid resuscitation with crystalloid given per sepsis guidelines, the patient remains hypotensive with systolic blood pressure less than 90 or MAP less than 65  Hemodynamic support with additional fluids and IV vasopressors as needed to maintain a SBP of 90 or MAP of 65  IV antibiotics as appropriate for source of sepsis  Reassessment: I have reassessed the patient's hemodynamic status    Initially treated with vancomycin/Zosyn and then changed to ceftaroline by ID, [facility] infectious disease  MSSA port infection and endocarditis/right-sided empyema identified, antibiotics changed to nafcillin by ID  Right anterior chest port removed 4/12  Still has indwelling  shunt, neurosurgery evaluation by Dr elkins at [facility]  Dropped hemoglobin and required transfusion, TPA/dornase via chest tube not given secondary to this problem  MRI brain pending  NE + vaso for MAP > 65      Pleural effusion, right -concern of empyema/hemothorax  Assessment & Plan  Right-sided effusion associated with  infected catheter right anterior chest and septic emboli, possible empyema  Status post chest tube placement 4/16  At [facility] complicated by some bleeding  Alteplase dornase considered via chest tube but held secondary to drop in hemoglobin requiring transfusion  Pleural studies reveal marked elevated white count as well as RBC count and primarily segs/bands    Plan:  Daily CXR  Antibiotics   4/17 Dr. Albert Benedict placed new R sided chest tube    Right-sided acute bacterial endocarditis  Assessment & Plan  Cultures growing MSSA at [facility]  Infected right anterior chest port/line removed  CTA chest 4/15 with multiple septic emboli worse on the right complicated by large right effusion with loculations  Echocardiogram 4/12 reveals echogenic mobile lesion on tricuspid valve RVSP 30, EF 55%  ID managing antibiotics  Monitor for the need for cardiology consultation and MICAH    Acute respiratory failure with hypoxia (HCC)  Assessment & Plan  Intubated 4/15 for respiratory failure at [facility]    Plan:  Lung protective ventilation strategies  Optimize oxygenation, ventilation, and acid base balance.   ABCEDF bundle  SAT/SBT   Sedation Lite    Acute encephalopathy- (present on admission)  Assessment & Plan  Multifactorial including septic and metabolic  Status post lumbar puncture with clear cytosis but negative Gram stain and negative culture so far  History of  shunt for pseudotumor cerebri    Plan:  Neurosurgery follow-up recommended keeping shunt for now and recommended obtaining MRI which is pending  Request neurosurgery follow-up in a.m.      Pneumonia  Assessment & Plan  MSSA septic emboli with empyema     Bacteremia due to methicillin susceptible Staphylococcus aureus (MSSA)  Assessment & Plan  Source presumed right anterior chest line/port  Recurrent line/port infection  Blood cultures positive for MSSA    Plan:  CT imaging suggestive of empyema and septic emboli presumably due to right-sided endocarditis versus just infected  line  Repeat blood cultures x2 continue to repeat every 48 hours until negative  Nafcillin 2 g IV every 4  ID following  Formal ECHO pending    CSF pleocytosis  Assessment & Plan  Status post lumbar puncture 4/13  WBC 53, predominantly travails  Glucose 47, protein 97 Gram stain negative and culture negative so far  Neurosurgery aware,  shunt still in place  MRI brain requested by neurosurgery at [facility]      Acute blood loss anemia  Assessment & Plan  Hemoglobin dropped from 8.3->6.4 on 4/16, repeat hemoglobin pending  Status post transfusion 1 unit of blood 4/16 at [facility]    Plan:  Q6 hour H/H  Conservative transfusion goal to hemoglobin > 7  Reverse coagulopathy     Thrombocytopenia (HCC)- (present on admission)  Assessment & Plan  Recurrent, severe at 54K  Serial CBC  Transfuse per protocols, if bleeds obtain TEG with mapping  No heparin for now  Sequentials    S/P  shunt- (present on admission)  Assessment & Plan  History of pseudotumor cerebri  History of  shunt by Dr. Oh  MRI brain pending, ordered 4/14 by Dr Oh not performed yet  History of neurostimulator for chronic pain, unknown if this is MRI compatible  Per transfer notes Dr. Oh did not want to remove the  shunt at this time   4/14 lumbar puncture did reveal pleocytosis but Gram stain negative and culture negative so far  Follow-up on CSF cultures, ID following    History of vasculitis  Assessment & Plan  Monitor    Prolonged Q-T interval on ECG  Assessment & Plan  Avoid QT prolonging agents as able  Optimize electrolytes  Cardiac monitoring  EKG    History of complicated migraines- (present on admission)  Assessment & Plan  Resume home regimen as clinically appropriate including Aimovig    Hyponatremia- (present on admission)  Assessment & Plan  Initial sodium 124 at [facility]  Improved to 134 4/16  Serial BMP    H/O: CVA (cerebrovascular accident)- (present on admission)  Assessment & Plan  Monitor      Chronic pain syndrome- (present on  admission)  Assessment & Plan  Chronic back pain  History of nerve stimulator     Plan:  Resume home regimen as clinically appropriate       VTE:  Contraindicated  Ulcer: H2 Antagonist  Lines: Central Line  Ongoing indication addressed, Arterial Line  Ongoing indication addressed and Lopez Catheter  Ongoing indication addressed    I have performed a physical exam and reviewed and updated ROS and Plan today (4/17/2021). In review of yesterday's note (4/16/2021), there are no changes except as documented above.     Discussed patient condition and risk of morbidity and/or mortality with Hospitalist, Family, RN, RT, Pharmacy and Charge nurse / hot rounds  The patient remains critically ill.  Critical care time = 61 minutes in directly providing and coordinating critical care and extensive data review.  No time overlap and excludes procedures.

## 2021-04-18 ENCOUNTER — APPOINTMENT (OUTPATIENT)
Dept: RADIOLOGY | Facility: MEDICAL CENTER | Age: 51
DRG: 003 | End: 2021-04-18
Attending: INTERNAL MEDICINE
Payer: MEDICARE

## 2021-04-18 PROBLEM — E87.1 HYPONATREMIA: Status: RESOLVED | Noted: 2019-09-28 | Resolved: 2021-04-18

## 2021-04-18 LAB
ANION GAP SERPL CALC-SCNC: 7 MMOL/L (ref 7–16)
ANISOCYTOSIS BLD QL SMEAR: ABNORMAL
BASOPHILS # BLD AUTO: 0 % (ref 0–1.8)
BASOPHILS # BLD: 0 K/UL (ref 0–0.12)
BUN SERPL-MCNC: 13 MG/DL (ref 8–22)
CA-I SERPL-SCNC: 0.9 MMOL/L (ref 1.1–1.3)
CALCIUM SERPL-MCNC: 7 MG/DL (ref 8.5–10.5)
CHLORIDE SERPL-SCNC: 96 MMOL/L (ref 96–112)
CO2 SERPL-SCNC: 26 MMOL/L (ref 20–33)
CREAT SERPL-MCNC: 0.54 MG/DL (ref 0.5–1.4)
EKG IMPRESSION: NORMAL
EOSINOPHIL # BLD AUTO: 0 K/UL (ref 0–0.51)
EOSINOPHIL NFR BLD: 0 % (ref 0–6.9)
ERYTHROCYTE [DISTWIDTH] IN BLOOD BY AUTOMATED COUNT: 54.4 FL (ref 35.9–50)
GLUCOSE SERPL-MCNC: 85 MG/DL (ref 65–99)
HCT VFR BLD AUTO: 21.5 % (ref 37–47)
HGB BLD-MCNC: 7 G/DL (ref 12–16)
HGB BLD-MCNC: 7.5 G/DL (ref 12–16)
HGB BLD-MCNC: 7.9 G/DL (ref 12–16)
HGB BLD-MCNC: 8.1 G/DL (ref 12–16)
HYPOCHROMIA BLD QL SMEAR: ABNORMAL
LYMPHOCYTES # BLD AUTO: 0.8 K/UL (ref 1–4.8)
LYMPHOCYTES NFR BLD: 3.5 % (ref 22–41)
MACROCYTES BLD QL SMEAR: ABNORMAL
MAGNESIUM SERPL-MCNC: 1.8 MG/DL (ref 1.5–2.5)
MANUAL DIFF BLD: NORMAL
MCH RBC QN AUTO: 28 PG (ref 27–33)
MCHC RBC AUTO-ENTMCNC: 34.9 G/DL (ref 33.6–35)
MCV RBC AUTO: 80.2 FL (ref 81.4–97.8)
METAMYELOCYTES NFR BLD MANUAL: 0.9 %
MICROCYTES BLD QL SMEAR: ABNORMAL
MONOCYTES # BLD AUTO: 1 K/UL (ref 0–0.85)
MONOCYTES NFR BLD AUTO: 4.4 % (ref 0–13.4)
MORPHOLOGY BLD-IMP: NORMAL
MYELOCYTES NFR BLD MANUAL: 2.6 %
NEUTROPHILS # BLD AUTO: 20 K/UL (ref 2–7.15)
NEUTROPHILS NFR BLD: 85.1 % (ref 44–72)
NEUTS BAND NFR BLD MANUAL: 2.6 % (ref 0–10)
NRBC # BLD AUTO: 0.02 K/UL
NRBC BLD-RTO: 0.1 /100 WBC
PHOSPHATE SERPL-MCNC: 4.4 MG/DL (ref 2.5–4.5)
PLATELET # BLD AUTO: 125 K/UL (ref 164–446)
PLATELET BLD QL SMEAR: NORMAL
PMV BLD AUTO: 9.8 FL (ref 9–12.9)
POLYCHROMASIA BLD QL SMEAR: NORMAL
POTASSIUM SERPL-SCNC: 3.4 MMOL/L (ref 3.6–5.5)
PROMYELOCYTES NFR BLD MANUAL: 0.9 %
RBC # BLD AUTO: 2.68 M/UL (ref 4.2–5.4)
RBC BLD AUTO: PRESENT
SODIUM SERPL-SCNC: 129 MMOL/L (ref 135–145)
TARGETS BLD QL SMEAR: NORMAL
TRIGL SERPL-MCNC: 223 MG/DL (ref 0–149)
WBC # BLD AUTO: 22.8 K/UL (ref 4.8–10.8)

## 2021-04-18 PROCEDURE — 82330 ASSAY OF CALCIUM: CPT

## 2021-04-18 PROCEDURE — 94799 UNLISTED PULMONARY SVC/PX: CPT

## 2021-04-18 PROCEDURE — 700105 HCHG RX REV CODE 258: Performed by: INTERNAL MEDICINE

## 2021-04-18 PROCEDURE — 94760 N-INVAS EAR/PLS OXIMETRY 1: CPT

## 2021-04-18 PROCEDURE — P9016 RBC LEUKOCYTES REDUCED: HCPCS

## 2021-04-18 PROCEDURE — 83735 ASSAY OF MAGNESIUM: CPT

## 2021-04-18 PROCEDURE — 80048 BASIC METABOLIC PNL TOTAL CA: CPT

## 2021-04-18 PROCEDURE — A9270 NON-COVERED ITEM OR SERVICE: HCPCS | Performed by: HOSPITALIST

## 2021-04-18 PROCEDURE — 700101 HCHG RX REV CODE 250: Performed by: INTERNAL MEDICINE

## 2021-04-18 PROCEDURE — 85018 HEMOGLOBIN: CPT | Mod: 91

## 2021-04-18 PROCEDURE — 86923 COMPATIBILITY TEST ELECTRIC: CPT

## 2021-04-18 PROCEDURE — 85007 BL SMEAR W/DIFF WBC COUNT: CPT

## 2021-04-18 PROCEDURE — 36430 TRANSFUSION BLD/BLD COMPNT: CPT

## 2021-04-18 PROCEDURE — A9270 NON-COVERED ITEM OR SERVICE: HCPCS | Performed by: INTERNAL MEDICINE

## 2021-04-18 PROCEDURE — 700111 HCHG RX REV CODE 636 W/ 250 OVERRIDE (IP): Performed by: INTERNAL MEDICINE

## 2021-04-18 PROCEDURE — 99291 CRITICAL CARE FIRST HOUR: CPT | Performed by: INTERNAL MEDICINE

## 2021-04-18 PROCEDURE — 700102 HCHG RX REV CODE 250 W/ 637 OVERRIDE(OP): Performed by: INTERNAL MEDICINE

## 2021-04-18 PROCEDURE — 85027 COMPLETE CBC AUTOMATED: CPT

## 2021-04-18 PROCEDURE — 94003 VENT MGMT INPAT SUBQ DAY: CPT

## 2021-04-18 PROCEDURE — 700102 HCHG RX REV CODE 250 W/ 637 OVERRIDE(OP): Performed by: HOSPITALIST

## 2021-04-18 PROCEDURE — 84100 ASSAY OF PHOSPHORUS: CPT

## 2021-04-18 PROCEDURE — 93005 ELECTROCARDIOGRAM TRACING: CPT | Performed by: INTERNAL MEDICINE

## 2021-04-18 PROCEDURE — 82803 BLOOD GASES ANY COMBINATION: CPT

## 2021-04-18 PROCEDURE — 93010 ELECTROCARDIOGRAM REPORT: CPT | Performed by: INTERNAL MEDICINE

## 2021-04-18 PROCEDURE — 770022 HCHG ROOM/CARE - ICU (200)

## 2021-04-18 PROCEDURE — 37799 UNLISTED PX VASCULAR SURGERY: CPT

## 2021-04-18 PROCEDURE — 84478 ASSAY OF TRIGLYCERIDES: CPT

## 2021-04-18 PROCEDURE — 3E0L3GC INTRODUCTION OF OTHER THERAPEUTIC SUBSTANCE INTO PLEURAL CAVITY, PERCUTANEOUS APPROACH: ICD-10-PCS | Performed by: INTERNAL MEDICINE

## 2021-04-18 RX ORDER — HEPARIN SODIUM 5000 [USP'U]/ML
5000 INJECTION, SOLUTION INTRAVENOUS; SUBCUTANEOUS EVERY 8 HOURS
Status: DISCONTINUED | OUTPATIENT
Start: 2021-04-18 | End: 2021-04-20

## 2021-04-18 RX ORDER — MAGNESIUM SULFATE HEPTAHYDRATE 40 MG/ML
2 INJECTION, SOLUTION INTRAVENOUS ONCE
Status: COMPLETED | OUTPATIENT
Start: 2021-04-18 | End: 2021-04-18

## 2021-04-18 RX ORDER — BISACODYL 10 MG
10 SUPPOSITORY, RECTAL RECTAL
Status: DISCONTINUED | OUTPATIENT
Start: 2021-04-18 | End: 2021-05-28

## 2021-04-18 RX ORDER — ACETAMINOPHEN 325 MG/1
650 TABLET ORAL EVERY 4 HOURS PRN
Status: DISCONTINUED | OUTPATIENT
Start: 2021-04-18 | End: 2021-05-28

## 2021-04-18 RX ORDER — AMOXICILLIN 250 MG
2 CAPSULE ORAL 2 TIMES DAILY
Status: DISCONTINUED | OUTPATIENT
Start: 2021-04-18 | End: 2021-05-28

## 2021-04-18 RX ORDER — POLYETHYLENE GLYCOL 3350 17 G/17G
1 POWDER, FOR SOLUTION ORAL
Status: DISCONTINUED | OUTPATIENT
Start: 2021-04-18 | End: 2021-05-28

## 2021-04-18 RX ADMIN — PROPOFOL 50 MCG/KG/MIN: 10 INJECTION, EMULSION INTRAVENOUS at 08:31

## 2021-04-18 RX ADMIN — NAFCILLIN SODIUM 2 G: 2 INJECTION, POWDER, FOR SOLUTION INTRAMUSCULAR; INTRAVENOUS at 15:04

## 2021-04-18 RX ADMIN — NAFCILLIN SODIUM 2 G: 2 INJECTION, POWDER, FOR SOLUTION INTRAMUSCULAR; INTRAVENOUS at 22:47

## 2021-04-18 RX ADMIN — PROPOFOL 60 MCG/KG/MIN: 10 INJECTION, EMULSION INTRAVENOUS at 14:19

## 2021-04-18 RX ADMIN — PROPOFOL 65 MCG/KG/MIN: 10 INJECTION, EMULSION INTRAVENOUS at 16:04

## 2021-04-18 RX ADMIN — RISPERIDONE 2 MG: 1 TABLET, FILM COATED ORAL at 16:52

## 2021-04-18 RX ADMIN — PROPOFOL 60 MCG/KG/MIN: 10 INJECTION, EMULSION INTRAVENOUS at 22:47

## 2021-04-18 RX ADMIN — DORNASE ALFA 5 MG: 1 SOLUTION RESPIRATORY (INHALATION) at 10:40

## 2021-04-18 RX ADMIN — NAFCILLIN SODIUM 2 G: 2 INJECTION, POWDER, FOR SOLUTION INTRAMUSCULAR; INTRAVENOUS at 06:22

## 2021-04-18 RX ADMIN — PAROXETINE HYDROCHLORIDE 10 MG: 20 TABLET, FILM COATED ORAL at 05:05

## 2021-04-18 RX ADMIN — VASOPRESSIN 0.03 UNITS/MIN: 20 INJECTION INTRAVENOUS at 23:05

## 2021-04-18 RX ADMIN — FENTANYL CITRATE 100 MCG: 50 INJECTION, SOLUTION INTRAMUSCULAR; INTRAVENOUS at 19:52

## 2021-04-18 RX ADMIN — FAMOTIDINE 20 MG: 20 TABLET ORAL at 05:05

## 2021-04-18 RX ADMIN — DIVALPROEX SODIUM 500 MG: 125 CAPSULE ORAL at 13:50

## 2021-04-18 RX ADMIN — NAFCILLIN SODIUM 2 G: 2 INJECTION, POWDER, FOR SOLUTION INTRAMUSCULAR; INTRAVENOUS at 11:10

## 2021-04-18 RX ADMIN — PROPOFOL 60 MCG/KG/MIN: 10 INJECTION, EMULSION INTRAVENOUS at 19:16

## 2021-04-18 RX ADMIN — DIVALPROEX SODIUM 500 MG: 125 CAPSULE ORAL at 21:15

## 2021-04-18 RX ADMIN — ACETAMINOPHEN 650 MG: 325 TABLET, FILM COATED ORAL at 07:42

## 2021-04-18 RX ADMIN — POTASSIUM BICARBONATE 50 MEQ: 978 TABLET, EFFERVESCENT ORAL at 07:31

## 2021-04-18 RX ADMIN — PROPOFOL 45 MCG/KG/MIN: 10 INJECTION, EMULSION INTRAVENOUS at 04:09

## 2021-04-18 RX ADMIN — MAGNESIUM SULFATE 2 G: 2 INJECTION INTRAVENOUS at 07:31

## 2021-04-18 RX ADMIN — HEPARIN SODIUM 5000 UNITS: 5000 INJECTION, SOLUTION INTRAVENOUS; SUBCUTANEOUS at 16:52

## 2021-04-18 RX ADMIN — VASOPRESSIN 0.03 UNITS/MIN: 20 INJECTION INTRAVENOUS at 14:15

## 2021-04-18 RX ADMIN — DULOXETINE HYDROCHLORIDE 60 MG: 60 CAPSULE, DELAYED RELEASE ORAL at 16:52

## 2021-04-18 RX ADMIN — ALTEPLASE 5 MG: KIT at 10:40

## 2021-04-18 RX ADMIN — NAFCILLIN SODIUM 2 G: 2 INJECTION, POWDER, FOR SOLUTION INTRAMUSCULAR; INTRAVENOUS at 18:19

## 2021-04-18 RX ADMIN — PROPOFOL 60 MCG/KG/MIN: 10 INJECTION, EMULSION INTRAVENOUS at 12:50

## 2021-04-18 RX ADMIN — NOREPINEPHRINE BITARTRATE 6 MCG/MIN: 1 INJECTION, SOLUTION, CONCENTRATE INTRAVENOUS at 13:51

## 2021-04-18 RX ADMIN — FAMOTIDINE 20 MG: 20 TABLET ORAL at 16:52

## 2021-04-18 RX ADMIN — NAFCILLIN SODIUM 2 G: 2 INJECTION, POWDER, FOR SOLUTION INTRAMUSCULAR; INTRAVENOUS at 02:12

## 2021-04-18 RX ADMIN — DULOXETINE HYDROCHLORIDE 60 MG: 60 CAPSULE, DELAYED RELEASE ORAL at 05:05

## 2021-04-18 RX ADMIN — DIVALPROEX SODIUM 500 MG: 125 CAPSULE ORAL at 05:05

## 2021-04-18 RX ADMIN — DOCUSATE SODIUM 50 MG AND SENNOSIDES 8.6 MG 2 TABLET: 8.6; 5 TABLET, FILM COATED ORAL at 05:05

## 2021-04-18 RX ADMIN — RISPERIDONE 2 MG: 1 TABLET, FILM COATED ORAL at 05:05

## 2021-04-18 RX ADMIN — VASOPRESSIN 0.03 UNITS/MIN: 20 INJECTION INTRAVENOUS at 10:12

## 2021-04-18 RX ADMIN — HEPARIN SODIUM 5000 UNITS: 5000 INJECTION, SOLUTION INTRAVENOUS; SUBCUTANEOUS at 21:16

## 2021-04-18 ASSESSMENT — PAIN DESCRIPTION - PAIN TYPE
TYPE: ACUTE PAIN

## 2021-04-18 ASSESSMENT — FIBROSIS 4 INDEX: FIB4 SCORE: 2.414953415699772872

## 2021-04-18 NOTE — PROGRESS NOTES
Bedside report received from CAROLINA Ramesh and patient care assumed at 0681.  Assessment including lines and infusions completed.

## 2021-04-18 NOTE — PROGRESS NOTES
Infectious Disease Daily Progress Note    Date of Service  4/18/2021    Chief Complaint  Nafcillin 2g Q4 hrs 4/15-present    Previous: Unasyn, Zosyn, Vanco, Daptomycin  Ceftaroline: start 4/13-4/15    4/14: Unable to give name of previous NSG or neurologist. Seems confused, but able to say some sentences fluently.   4/15: Line cultures now growing MSSA. As well as from the blood. Repeat blood culture 4/13+ in both sets. Patient has worsening confusion. CSF fluid analyses suggest pleocytosis. Cultures are no growth from the CSF. Chest CT was ordered this morning indicating a loculated right pleural effusion as well as bilateral septic emboli. Dr. Mack spoke with Dr. Oh yesterday and no surgical intervention unless clinical deterioration.  4/16: Increased respiratory distress.  Tachypneic.  CT with loculated collection.  Dr Resendiz not available.  No thoracic surgeon available.  Plans to have pulmonary place CT.  Transferring to ICU.  4/17: Transferred to [facility] last night. Hgb dropped to 6.4 requiring PRBC transfusion. Requiring 2 pressors. Fio2 50%. Febrile 38.8. Pending OR decortication of empyema and hemothorax. Chest tube placed at [facility] shows 8,371 WBC, ,000, 74% N  4/18: Chest tube was upsized by surgery yesterday, no decortication. WBC 22k. Fio2 40%. Vasopressin. Levophed down to 2mcg. Afebrile 24 hrs. Last fever 38.6 on 4/16.     Review of Systems  Review of Systems   Unable to perform ROS: Acuity of condition        Physical Exam  Temp:  [37.2 °C (99 °F)-37.6 °C (99.7 °F)] 37.6 °C (99.7 °F)  Pulse:  [67-93] 93  Resp:  [0-27] 27  BP: ()/(32-72) 97/42  SpO2:  [95 %-100 %] 99 %    Physical Exam  Constitutional:       Comments: Intubated sedated . Exam unchanged  HENT:      Head: Normocephalic.      Comments: ET tube  Cardiovascular:      Rate and Rhythm: Normal rate and regular rhythm.      Heart sounds: No murmur.   Pulmonary:      Comments: Diminished R side  Abdominal:      General:  Abdomen is flat. Bowel sounds are normal. There is no distension.   Musculoskeletal:         General: No swelling or deformity.   Skin:     Comments: R chest tube   Fluids    Intake/Output Summary (Last 24 hours) at 4/18/2021 0703  Last data filed at 4/18/2021 0600  Gross per 24 hour   Intake 1952.56 ml   Output 1941 ml   Net 11.56 ml       Laboratory  Recent Labs     04/17/21  0518 04/17/21  0518 04/17/21  0815 04/17/21  0815 04/17/21  1300 04/17/21 2005 04/18/21  0205   WBC 18.0*  --  16.8*  --   --   --  22.8*   RBC 2.61*  --  2.88*  --   --   --  2.68*   HEMOGLOBIN 7.0*   < > 7.8*   < > 8.0* 8.1* 7.5*   HEMATOCRIT 20.6*  --  22.8*  --   --   --  21.5*   MCV 78.9*  --  79.2*  --   --   --  80.2*   MCH 26.8*  --  27.1  --   --   --  28.0   MCHC 34.0  --  34.2  --   --   --  34.9   RDW 56.3*  --  54.2*  --   --   --  54.4*   PLATELETCT 74*  --  80*  --   --   --  125*   MPV 10.4  --  10.4  --   --   --  9.8    < > = values in this interval not displayed.     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   -Acute septic emboli bilaterally  -Acute bilateral pneumonia due to above  -Pseudotumor cerebri with underlying  shunt  -Chronic migraine headaches  -History of autoimmune vasculitis  -Elevated alkaline phosphatase  -Acute encephalopathy due to sepsis staphylococcal, meningitis ruled out      - hemothorax R      - anemia due to above     Plan    Patient has evidence of disseminated staphylococcal sepsis source from her line with infection to her lungs, tricuspid valve and now has R empyema requiring transfer to [facility] for decortication due to lack of CT surgery support     - Continue Nafcillin for CNS penetration for now. Initial CSF does not show ventriculitis, but cannot rule out septic brain emboli and brain abscess  - initial CSF cultures has pleocytosis, but no culture growth  - chest tube upsized by surgery 4/17.   - thoracentesis and pleural fluid consistent with empyema and hemothorax  - blood culture from 4/14 at [facility] still positive. Pending 4/17 blood cultures.   - has retained R central line. Will need to be replaced once stable  - Will need MRI brain to rule out septic emboli/brain abscess/  shunt infection  - Continue to follow CSF and pleural cultures  - original infected port removed 4/12  - Neurosurgery Dr Oh was notified and no surgical intervention for  shunt  - vent management per pulmonology  No treatment changes today     35 min spent with 50% face to face care and critical care time involvement  Thank you for this consult. Case discussed with RN.

## 2021-04-18 NOTE — PROGRESS NOTES
"Critical Care Progress Note    Date of admission  4/16/2021    Chief Complaint  \"50 y.o. female the past medical history of pseudotumor cerebri status post  shunt by Dr. Oh, chronic pain with history of placement of nerve stimulator, vasculitis, right anterior chest port for home IV meds with recurrent infection who presented 4/11/2021 with to [facility] with recurrent port infection and was initially treated with vancomycin and Zosyn and then changed to ceftaroline and subsequently switched to nafcillin when MSSA was identified.  [facility] infectious disease is managing antibiotics.  Dr. Candelaria removed the port on 4/12.  Imaging suggest tricuspid valve endocarditis.  Lumbar puncture performed 4/14 showed pleocytosis with negative Gram stain and culture so far.  She had worsening leukocytosis with WBC up to 35K and chest CT yesterday revealed septic emboli with greater involvement in the right lung as well as a large right pleural effusion.  IR performed a thoracentesis and placed a small pigtail catheter in the right chest which is now at 20 cm of suction.  There were significant loculations and fluid analysis suggested empyema.  Patient subsequently developed increased work of breathing and was intubated and placed on the ventilator yesterday.  Hemoglobin dropped from 8.3-6.9 as they were considering TPA and dornase via this pigtail catheter and instead they transfused 1 unit of blood.  The plan was to consult thoracic surgery but their only surgeon is out of town till next week.  Dr. Oh consulted on 4/14 and recommended brain MRI and it was ordered but is still not performed (nerve stimulator not MRI compatible?).  Dr. Oh did not recommend removal of shunt at this time apparently.  The patient's had some hypotension while on the ventilator requiring titration of norepinephrine and vasopressin.  The patient is transferred to [facility] for surgery consultation and Dr. Albert Benedict excepted the patient along with  " "Gonda on transfer.\"    Hospital Course  4/16 admitted to ICU  4/17 VD 2, 2 FFP, pRBC overnight; new chest tube per gen surg  4/18 VD 3, TPa/Dornase with 1400 cc from R chest tube    Interval Problem Update  Reviewed last 24 hour events:  Critically ill  VD 3 PEEP 8/40%  Propofol + fentanyl  SR  Tmax 99.7  UOP 1.3 L  MAP > 65 on NE  Heparin SQ  H2 blocker  Nafcillin   4/18 CXR: R 36 F chest tube appears to have inadvertently been pulled back  4/17 BC still NGTD    Review of Systems  Review of Systems   Unable to perform ROS: Intubated        Vital Signs for last 24 hours   Temp:  [37.2 °C (99 °F)-37.6 °C (99.7 °F)] 37.6 °C (99.7 °F)  Pulse:  [71-93] 71  Resp:  [16-27] 20  BP: ()/(30-72) 88/34  SpO2:  [94 %-100 %] 98 %    Hemodynamic parameters for last 24 hours  CVP:  [8 MM HG-300 MM HG] 275 MM HG    Respiratory Information for the last 24 hours  Vent Mode: APVCMV  Rate (breaths/min): 30  Vt Target (mL): 320  PEEP/CPAP: 8  MAP: 14  Control VTE (exp VT): 315    Physical Exam   Physical Exam  Vitals and nursing note reviewed.   Constitutional:       Appearance: She is ill-appearing.   HENT:      Head: Normocephalic and atraumatic.      Right Ear: External ear normal.      Left Ear: External ear normal.      Nose: Nose normal.      Mouth/Throat:      Mouth: Mucous membranes are moist.   Eyes:      Pupils: Pupils are equal, round, and reactive to light.   Cardiovascular:      Rate and Rhythm: Normal rate and regular rhythm.      Pulses: Normal pulses.      Heart sounds: Murmur present. No gallop.       Comments: R sided chest tube  Pulmonary:      Comments: Equal and symmetric breath sounds to mechanical ventilation  Abdominal:      General: Abdomen is flat. There is no distension.      Palpations: Abdomen is soft.      Tenderness: There is no abdominal tenderness. There is no guarding or rebound.   Musculoskeletal:      Cervical back: No rigidity.      Right lower leg: No edema.      Left lower leg: No edema. "   Lymphadenopathy:      Cervical: No cervical adenopathy.   Skin:     General: Skin is warm and dry.      Capillary Refill: Capillary refill takes less than 2 seconds.   Neurological:      Comments: Grimaces, moves all 4 extremities         Medications  Current Facility-Administered Medications   Medication Dose Route Frequency Provider Last Rate Last Admin   • heparin injection 5,000 Units  5,000 Units Subcutaneous Q8HRS Elliott Salvador M.D.       • alteplase (Activase) 5 mg in NS 30 mL INTRAPLEURAL syringe  5 mg Intrapleural TWICE DAILY Elliott Salvador M.D.   5 mg at 04/18/21 1040    And   • dornase alpha (PULMOZYME) 5 mg in NS 30 mL INTRAPLEURAL syringe  5 mg Intrapleural TWICE DAILY Elliott Salvador M.D.   5 mg at 04/18/21 1040   • alteplase (Activase) 5 mg in NS 30 mL INTRAPLEURAL syringe  5 mg Intrapleural Once Elliott Salvador M.D.        And   • dornase alpha (PULMOZYME) 5 mg in NS 30 mL INTRAPLEURAL syringe  5 mg Intrapleural Once Elliott Salvador M.D.   Stopped at 04/18/21 1600   • divalproex (DEPAKOTE SPRINKLE) capsule 500 mg  500 mg Enteral Tube Q8HRS Elliott Salvador M.D.   500 mg at 04/18/21 1350   • DULoxetine (CYMBALTA) capsule 60 mg  60 mg Enteral Tube BID Elliott Salvador M.D.   60 mg at 04/18/21 0505   • PARoxetine (PAXIL) tablet 10 mg  10 mg Enteral Tube QAM Elliott Salvador M.D.   10 mg at 04/18/21 0505   • risperiDONE (RISPERDAL) tablet 2 mg  2 mg Enteral Tube BID Elliott Salvador M.D.   2 mg at 04/18/21 0505   • Respiratory Therapy Consult   Nebulization Continuous RT Man Wahl M.D.       • famotidine (PEPCID) tablet 20 mg  20 mg Enteral Tube Q12HRS Man Wahl M.D.   20 mg at 04/18/21 0505    Or   • famotidine (PEPCID) injection 20 mg  20 mg Intravenous Q12HRS Man Wahl M.D.   20 mg at 04/17/21 1747   • MD Alert...ICU Electrolyte Replacement per Pharmacy   Other PHARMACY TO DOSE Man Wahl M.D.       • lidocaine (XYLOCAINE) 1 % injection 1-2 mL  1-2  mL Tracheal Tube Q30 MIN PRN Man Wahl M.D.       • ipratropium-albuterol (DUONEB) nebulizer solution  3 mL Nebulization Q2HRS PRN (RT) Man Wahl M.D.       • fentaNYL (SUBLIMAZE) injection 25 mcg  25 mcg Intravenous Q HOUR PRN Man Wahl M.D.        Or   • fentaNYL (SUBLIMAZE) injection 50 mcg  50 mcg Intravenous Q HOUR PRN Man Wahl M.D.        Or   • fentaNYL (SUBLIMAZE) injection 100 mcg  100 mcg Intravenous Q HOUR PRN Man Wahl M.D.   100 mcg at 04/17/21 0523   • fentaNYL (SUBLIMAZE) 50 mcg/mL in 50mL   Intravenous Continuous Man Wahl M.D. 2 mL/hr at 04/17/21 0312 100 mcg at 04/17/21 1504   • propofol (DIPRIVAN) injection  0-80 mcg/kg/min Intravenous Continuous Man Wahl M.D. 29.3 mL/hr at 04/18/21 1604 65 mcg/kg/min at 04/18/21 1604   • norepinephrine (Levophed) infusion 8 mg/250 mL (premix)  0-30 mcg/min Intravenous Continuous Man Wahl M.D. 11.3 mL/hr at 04/18/21 1351 6 mcg/min at 04/18/21 1351   • vasopressin (VASOSTRICT) 20 Units in  mL Infusion  0.03 Units/min Intravenous Continuous Man Wahl M.D. 9 mL/hr at 04/18/21 1415 0.03 Units/min at 04/18/21 1415   • senna-docusate (PERICOLACE or SENOKOT S) 8.6-50 MG per tablet 2 tablet  2 tablet Oral BID Ana Garcia M.D.   2 tablet at 04/18/21 0505    And   • polyethylene glycol/lytes (MIRALAX) PACKET 1 Packet  1 Packet Oral QDAY PRN Ana Garcia M.D.        And   • magnesium hydroxide (MILK OF MAGNESIA) suspension 30 mL  30 mL Oral QDAY PRN Ana Garcia M.D.        And   • bisacodyl (DULCOLAX) suppository 10 mg  10 mg Rectal QDAY PRN Ana Garcia M.D.       • nafcillin 2 g in dextrose 5% 100 mL IVPB  2 g Intravenous Q4HR Man Wahl M.D. 200 mL/hr at 04/18/21 1504 2 g at 04/18/21 1504   • acetaminophen (Tylenol) tablet 650 mg  650 mg Oral Q4HRS PRN Man Wahl M.D.   650 mg at 04/18/21 0742       Fluids    Intake/Output Summary (Last 24 hours) at  4/18/2021 1623  Last data filed at 4/18/2021 1500  Gross per 24 hour   Intake 1405.16 ml   Output 2935 ml   Net -1529.84 ml       Laboratory  Recent Labs     04/16/21 2029 04/17/21  0326   ISTATAPH 7.435 7.437   ISTATAPCO2 38.9* 40.1*   ISTATAPO2 97* 70   ISTATATCO2 27 28   RLOCTEJ7YXQ 98 94   ISTATARTHCO3 26.1* 27.0*   ISTATARTBE 2 3   ISTATTEMP see below see below   ISTATSPEC Arterial Arterial         Recent Labs     04/16/21  1840 04/17/21  0518 04/18/21  0205   SODIUM 134* 134* 129*   POTASSIUM 3.9 3.0* 3.4*   CHLORIDE 97 96 96   CO2 25 28 26   BUN 12 15 13   CREATININE 0.75 0.79 0.54   MAGNESIUM 1.6 2.2 1.8   PHOSPHORUS  --  5.2* 4.4   CALCIUM 7.1* 6.9* 7.0*     Recent Labs     04/16/21  1840 04/17/21  0518 04/18/21  0205   GLUCOSE 121* 118* 85     Recent Labs     04/16/21  1840 04/16/21  1840 04/17/21  0518 04/17/21  0815 04/18/21  0205   WBC 27.1*   < > 18.0* 16.8* 22.8*   NEUTSPOLYS 87.70*  --  91.30*  --  85.10*   LYMPHOCYTES 2.60*  --  0.90*  --  3.50*   MONOCYTES 4.40  --  6.10  --  4.40   EOSINOPHILS 0.00  --  0.00  --  0.00   BASOPHILS 0.00  --  0.00  --  0.00    < > = values in this interval not displayed.     Recent Labs     04/17/21  0518 04/17/21  0518 04/17/21  0815 04/17/21  1300 04/18/21  0205 04/18/21  0845 04/18/21  1400   RBC 2.61*  --  2.88*  --  2.68*  --   --    HEMOGLOBIN 7.0*   < > 7.8*   < > 7.5* 7.9* 8.1*   HEMATOCRIT 20.6*  --  22.8*  --  21.5*  --   --    PLATELETCT 74*  --  80*  --  125*  --   --     < > = values in this interval not displayed.       Imaging  X-Ray:  I have personally reviewed the images and compared with prior images.    Assessment/Plan  * Septic shock (HCC)  Assessment & Plan  This is Septic shock Present on admission  SIRS criteria identified on my evaluation include: Tachypnea, with respirations greater than 20 per minute and Leukocytosis, with WBC greater than 12,000  Source is right anterior chest port, MSSA  Suspected onset of infection unknown  Patient  developed hypotension at transferring facility [facility], resuscitation complete there, currently titrating norepinephrine/vasopressin MAP > 65  IV antibiotics as appropriate for source of sepsis  Reassessment: I have reassessed the patient's hemodynamic status    MSSA endocarditis + R lung empyema and bacteremia   Nafcillin   Follow repeat cultures  NE + vaso for MAP > 65      Pleural effusion, right -concern of empyema/hemothorax  Assessment & Plan  Right-sided effusion associated with infected catheter right anterior chest and septic emboli, possible empyema  Status post chest tube placement 4/16  At [facility] complicated by some bleeding  Alteplase dornase considered via chest tube but held secondary to drop in hemoglobin requiring transfusion  Pleural studies reveal marked elevated white count as well as RBC count and primarily segs/bands    Plan:  Daily CXR  Nafcillin  4/17 Dr. Albert Benedict placed new R sided chest tube  4/18 TPa/Dornase BID x 3 days initiated     Right-sided acute bacterial endocarditis  Assessment & Plan  Cultures growing MSSA at [facility]  Infected right anterior chest port/line removed  CTA chest 4/15 with multiple septic emboli worse on the right complicated by large right effusion with loculations  Echocardiogram 4/12 reveals echogenic mobile lesion on tricuspid valve RVSP 30, EF 55%  ID managing antibiotics  Monitor for the need for cardiology consultation and MICAH    Acute respiratory failure with hypoxia (HCC)  Assessment & Plan  Intubated 4/15 for respiratory failure at [facility]    Plan:  Lung protective ventilation strategies  Optimize oxygenation, ventilation, and acid base balance.   ABCEDF bundle  SAT/SBT   Sedation Lite    Acute encephalopathy- (present on admission)  Assessment & Plan  Multifactorial including septic and metabolic  Status post lumbar puncture with cytosis but negative Gram stain and cultures NGTD   shunt for pseudotumor cerebri    Plan:  MRI pending    Pneumonia  Assessment &  Plan  MSSA septic emboli with empyema     Bacteremia due to methicillin susceptible Staphylococcus aureus (MSSA)  Assessment & Plan  Source presumed right anterior chest line/port  Recurrent line/port infection  Blood cultures positive for MSSA    Plan:  CT imaging suggestive of empyema and septic emboli    Repeat blood cultures every 48 hours until negative  Nafcillin 2 g IV every 4  ID following  Formal ECHO pending    CSF pleocytosis  Assessment & Plan  Status post lumbar puncture 4/13  WBC 53  Glucose 47, protein 97 Gram stain negative and culture negative so far  Neurosurgery aware,  shunt in place  MRI brain requested by neurosurgery at [facility]      Acute blood loss anemia  Assessment & Plan  Hemoglobin dropped from 8.3->6.4 on 4/16, repeat hemoglobin pending  Status post transfusion 1 unit of blood 4/16 at [facility]    Plan:  Q6 hour H/H  Conservative transfusion goal to hemoglobin > 7  Reverse coagulopathy     Thrombocytopenia (HCC)- (present on admission)  Assessment & Plan  Sepsis induced  Improving  No antiplatelets/AC until > 50    S/P  shunt- (present on admission)  Assessment & Plan  History of pseudotumor cerebri  History of  shunt by Dr. Oh  MRI brain pending, ordered 4/14 by Dr Oh; awaiting MRI compatibility clarification  4/14 lumbar puncture did reveal pleocytosis but Gram stain negative cultures NGTD  Follow CSF cultures, ID following    History of vasculitis  Assessment & Plan  Monitor    Prolonged Q-T interval on ECG  Assessment & Plan  Avoid QT prolonging agents as able  Optimize electrolytes  Cardiac monitoring  EKG    History of complicated migraines- (present on admission)  Assessment & Plan  Resume home regimen when clinically appropriate    H/O: CVA (cerebrovascular accident)- (present on admission)  Assessment & Plan  Monitor      Chronic pain syndrome- (present on admission)  Assessment & Plan  Chronic back pain  History of nerve stimulator     Plan:  Resume home regimen as  clinically appropriate       VTE:  Heparin SQ  Ulcer: H2 Antagonist  Lines: Central Line  Ongoing indication addressed, Arterial Line  Ongoing indication addressed and Lopez Catheter  Ongoing indication addressed    I have performed a physical exam and reviewed and updated ROS and Plan today (4/18/2021). In review of yesterday's note (4/17/2021), there are no changes except as documented above.     Discussed patient condition and risk of morbidity and/or mortality with Hospitalist, Family, RN, RT, Pharmacy and Charge nurse / hot rounds  The patient remains critically ill.  Critical care time = 65 minutes in directly providing and coordinating critical care and extensive data review.  No time overlap and excludes procedures.

## 2021-04-18 NOTE — PROGRESS NOTES
Late Entry     2125  Dr. Wahl at bedside discussed chest tube output, vent, and current sedation and pressor rates.     Before patient can have MRI, it needs to be determined if her nerve stimulator is MRI compatible.  Discussed day shift checking with  to see if he has any information on what type of nerve stimulator the patient has.   Will pass on during morning bedside report.      2345  Vent constantly alarming High PEEP.  No interventions resolved alarm.  RT notified.  Stat CXR ordered to assess if hemithorax or loculated effusion had gotten worse.  Vent filters changed by RT.

## 2021-04-18 NOTE — PROGRESS NOTES
DATE: 4/18/2021     Hospital day #2 for right empyema and severe septic shock    Interval Events:  Hemoglobin and hematocrit remained stable.  Serosanguineous output in chest tube.  Some improvement on chest x-ray    PHYSICAL EXAMINATION:  Vital Signs: BP (!) 96/48   Pulse 76   Temp 37.6 °C (99.7 °F) (Bladder)   Resp 20   Wt 77.5 kg (170 lb 13.7 oz)   SpO2 98%     Serosanguineous output on chest tube, mechanically ventilated    ASSESSMENT AND PLAN:   Patient with right empyema recommend TPA dornase x3 days twice a day we will continue to follow along.    Appreciate ICU and consulting physician care.       ____________________________________     Albert Benedict M.D.    DD: 4/18/2021  9:39 AM

## 2021-04-18 NOTE — PROGRESS NOTES
RN at bedside, patient with EKG changes, heart rate increased from , then rate back down to 80's with frequent ectopy. No change in BP. Dr. Salvador notified and at bedside. Labs sent and STAT EKG done.

## 2021-04-18 NOTE — FLOWSHEET NOTE
Ventilator Daily Summary    Vent Day # 3  7.5 @ 23    APVCMV: 20/320/+8/40%    Plan: Continue current ventilator settings and wean mechanical ventilation as tolerated per physician orders.

## 2021-04-18 NOTE — CARE PLAN
Vent day 2    20/320/+8, 45%    No respiratory procedures     Do not use PEEP ladder or perform SAT/ SBT per MD

## 2021-04-18 NOTE — PROGRESS NOTES
RCC    Follow-up hemoglobin unchanged at 8.1  Improved from janett of 6.4 about 24 hours ago  Status post 2 units of blood as well as other blood products  Status post new larger bore chest tube placement by Dr. Benedict  Chest tube output underwhelming compared to chest x-ray abnormalities  Chest x-ray is reviewed back to 4/8/2021 and that film was normal  Right-sided loculated effusion increasing in size as well as hemoglobin following and blood transfused both at [facility] and here at [facility].    Query need for decortication over TPA dornase especially if there is a component of hemothorax  If we can clear hemothorax quickly with VATS likely she would be ready for SAT & SBT and come off pressors and hopefully extubated quickly?  Viewed with RN and RT at bedside and with Dr. Salvador earlier

## 2021-04-18 NOTE — CARE PLAN
Problem: Communication  Goal: The ability to communicate needs accurately and effectively will improve  Outcome: PROGRESSING SLOWER THAN EXPECTED     Problem: Safety  Goal: Will remain free from injury  Outcome: PROGRESSING AS EXPECTED  Goal: Will remain free from falls  Outcome: PROGRESSING AS EXPECTED     Problem: Infection  Goal: Will remain free from infection  Outcome: PROGRESSING SLOWER THAN EXPECTED     Problem: Venous Thromboembolism (VTW)/Deep Vein Thrombosis (DVT) Prevention:  Goal: Patient will participate in Venous Thrombosis (VTE)/Deep Vein Thrombosis (DVT)Prevention Measures  Outcome: PROGRESSING AS EXPECTED     Problem: Bowel/Gastric:  Goal: Normal bowel function is maintained or improved  Outcome: PROGRESSING AS EXPECTED  Goal: Will not experience complications related to bowel motility  Outcome: PROGRESSING AS EXPECTED     Problem: Knowledge Deficit  Goal: Knowledge of disease process/condition, treatment plan, diagnostic tests, and medications will improve  Outcome: PROGRESSING SLOWER THAN EXPECTED  Goal: Knowledge of the prescribed therapeutic regimen will improve  Outcome: PROGRESSING SLOWER THAN EXPECTED     Problem: Discharge Barriers/Planning  Goal: Patient's continuum of care needs will be met  Outcome: PROGRESSING SLOWER THAN EXPECTED     Problem: Fluid Volume:  Goal: Will maintain balanced intake and output  Outcome: PROGRESSING AS EXPECTED     Problem: Safety - Medical Restraint  Goal: Remains free of injury from restraints (Restraint for Interference with Medical Device)  Description: INTERVENTIONS:  1. Determine that other, less restrictive measures have been tried or would not be effective before applying the restraint  2. Evaluate the patient's condition at the time of restraint application  3. Inform patient/family regarding the reason for restraint  4. Q2H: Monitor safety, psychosocial status, comfort, nutrition and hydration  Outcome: PROGRESSING AS EXPECTED  Goal: Free from  restraint(s) (Restraint for Interference with Medical Device)  Description: INTERVENTIONS:  1. ONCE/SHIFT or MINIMUM Q12H: Assess and document the continuing need for restraints  2. Q24H: Continued use of restraint requires LIP to perform face to face examination and written order  3. Identify and implement measures to help patient regain control  Outcome: PROGRESSING AS EXPECTED     Problem: Respiratory:  Goal: Respiratory status will improve  Outcome: PROGRESSING SLOWER THAN EXPECTED     Problem: Skin Integrity  Goal: Risk for impaired skin integrity will decrease  Outcome: PROGRESSING AS EXPECTED     Problem: Psychosocial Needs:  Goal: Level of anxiety will decrease  Outcome: PROGRESSING AS EXPECTED     Problem: Mechanical Ventilation  Goal: Safe management of artificial airway and ventilation  Outcome: PROGRESSING AS EXPECTED  Goal: Ability to express needs and understand communication  Outcome: PROGRESSING AS EXPECTED  Goal: Successful weaning off mechanical ventilator. Spontaneously maintains adequate gas exchange  Outcome: PROGRESSING AS EXPECTED     Problem: Hemodynamic Status  Goal: Vital Signs and Fluid Balance Management  Outcome: PROGRESSING SLOWER THAN EXPECTED     Problem: Nutrition Deficit  Goal: Enteral Nutrition Management  Outcome: PROGRESSING SLOWER THAN EXPECTED

## 2021-04-18 NOTE — CARE PLAN
Problem: Safety - Medical Restraint  Goal: Remains free of injury from restraints (Restraint for Interference with Medical Device)  Description: INTERVENTIONS:  1. Determine that other, less restrictive measures have been tried or would not be effective before applying the restraint  2. Evaluate the patient's condition at the time of restraint application  3. Inform patient/family regarding the reason for restraint  4. Q2H: Monitor safety, psychosocial status, comfort, nutrition and hydration  Outcome: PROGRESSING AS EXPECTED  Note: Assessment of need for restraints completed, order verified, use of restraints explained to pt, no evidence of learning, turns, mouth care, skin assessment every 2 hours.         Problem: Mechanical Ventilation  Goal: Safe management of artificial airway and ventilation  Note: Oral care, suctioning once or twice per encounter, moderate, thick, bloody, pink secretions, HOB at 30 degrees, ambu bag at bedside, ventilator plugged into red outlet.  No SBTs at this time per MD.         Problem: Hemodynamic Status  Goal: Vital Signs and Fluid Balance Management  Note: Titrating levophed to maintain SBP equal to or greater than 65.  R radial arterial line zeroed and flushed and correlating with NIBP.

## 2021-04-19 ENCOUNTER — APPOINTMENT (OUTPATIENT)
Dept: RADIOLOGY | Facility: MEDICAL CENTER | Age: 51
DRG: 003 | End: 2021-04-19
Attending: EMERGENCY MEDICINE
Payer: MEDICARE

## 2021-04-19 ENCOUNTER — APPOINTMENT (OUTPATIENT)
Dept: RADIOLOGY | Facility: MEDICAL CENTER | Age: 51
DRG: 003 | End: 2021-04-19
Attending: INTERNAL MEDICINE
Payer: MEDICARE

## 2021-04-19 LAB
ALBUMIN SERPL BCP-MCNC: 1.7 G/DL (ref 3.2–4.9)
ALBUMIN/GLOB SERPL: 0.4 G/DL
ALP SERPL-CCNC: 599 U/L (ref 30–99)
ALT SERPL-CCNC: 16 U/L (ref 2–50)
ANION GAP SERPL CALC-SCNC: 8 MMOL/L (ref 7–16)
ANISOCYTOSIS BLD QL SMEAR: ABNORMAL
AST SERPL-CCNC: 16 U/L (ref 12–45)
BACTERIA BLD CULT: ABNORMAL
BASE EXCESS BLDA CALC-SCNC: 2 MMOL/L (ref -4–3)
BASE EXCESS BLDA CALC-SCNC: 2 MMOL/L (ref -4–3)
BASOPHILS # BLD AUTO: 0 % (ref 0–1.8)
BASOPHILS # BLD: 0 K/UL (ref 0–0.12)
BILIRUB SERPL-MCNC: 1.9 MG/DL (ref 0.1–1.5)
BODY TEMPERATURE: ABNORMAL DEGREES
BODY TEMPERATURE: ABNORMAL DEGREES
BREATHS SETTING VENT: 20
BREATHS SETTING VENT: 20
BUN SERPL-MCNC: 13 MG/DL (ref 8–22)
CALCIUM SERPL-MCNC: 7 MG/DL (ref 8.5–10.5)
CHLORIDE SERPL-SCNC: 96 MMOL/L (ref 96–112)
CO2 BLDA-SCNC: 27 MMOL/L (ref 20–33)
CO2 BLDA-SCNC: 27 MMOL/L (ref 20–33)
CO2 SERPL-SCNC: 26 MMOL/L (ref 20–33)
CREAT SERPL-MCNC: 0.56 MG/DL (ref 0.5–1.4)
CRP SERPL HS-MCNC: 18.27 MG/DL (ref 0–0.75)
CRP SERPL HS-MCNC: 20.48 MG/DL (ref 0–0.75)
DELSYS IDSYS: ABNORMAL
DELSYS IDSYS: ABNORMAL
END TIDAL CARBON DIOXIDE IECO2: 38 MMHG
END TIDAL CARBON DIOXIDE IECO2: 40 MMHG
EOSINOPHIL # BLD AUTO: 0 K/UL (ref 0–0.51)
EOSINOPHIL NFR BLD: 0 % (ref 0–6.9)
ERYTHROCYTE [DISTWIDTH] IN BLOOD BY AUTOMATED COUNT: 53.6 FL (ref 35.9–50)
GLOBULIN SER CALC-MCNC: 3.8 G/DL (ref 1.9–3.5)
GLUCOSE SERPL-MCNC: 136 MG/DL (ref 65–99)
HCO3 BLDA-SCNC: 25.7 MMOL/L (ref 17–25)
HCO3 BLDA-SCNC: 26.1 MMOL/L (ref 17–25)
HCT VFR BLD AUTO: 22.7 % (ref 37–47)
HGB BLD-MCNC: 7.5 G/DL (ref 12–16)
HGB BLD-MCNC: 7.8 G/DL (ref 12–16)
HGB BLD-MCNC: 8.1 G/DL (ref 12–16)
HOROWITZ INDEX BLDA+IHG-RTO: 230 MM[HG]
HOROWITZ INDEX BLDA+IHG-RTO: 310 MM[HG]
HYPOCHROMIA BLD QL SMEAR: ABNORMAL
LYMPHOCYTES # BLD AUTO: 0.57 K/UL (ref 1–4.8)
LYMPHOCYTES NFR BLD: 2.6 % (ref 22–41)
MACROCYTES BLD QL SMEAR: ABNORMAL
MAGNESIUM SERPL-MCNC: 1.8 MG/DL (ref 1.5–2.5)
MANUAL DIFF BLD: NORMAL
MCH RBC QN AUTO: 28 PG (ref 27–33)
MCHC RBC AUTO-ENTMCNC: 34.4 G/DL (ref 33.6–35)
MCV RBC AUTO: 81.4 FL (ref 81.4–97.8)
METAMYELOCYTES NFR BLD MANUAL: 0.9 %
MICROCYTES BLD QL SMEAR: ABNORMAL
MODE IMODE: ABNORMAL
MODE IMODE: ABNORMAL
MONOCYTES # BLD AUTO: 1.33 K/UL (ref 0–0.85)
MONOCYTES NFR BLD AUTO: 6.1 % (ref 0–13.4)
MORPHOLOGY BLD-IMP: NORMAL
NEUTROPHILS # BLD AUTO: 19.71 K/UL (ref 2–7.15)
NEUTROPHILS NFR BLD: 86.1 % (ref 44–72)
NEUTS BAND NFR BLD MANUAL: 4.3 % (ref 0–10)
NRBC # BLD AUTO: 0 K/UL
NRBC BLD-RTO: 0 /100 WBC
O2/TOTAL GAS SETTING VFR VENT: 30 %
O2/TOTAL GAS SETTING VFR VENT: 40 %
PCO2 BLDA: 35.7 MMHG (ref 26–37)
PCO2 BLDA: 36.8 MMHG (ref 26–37)
PCO2 TEMP ADJ BLDA: 36.6 MMHG (ref 26–37)
PCO2 TEMP ADJ BLDA: 37.2 MMHG (ref 26–37)
PEEP END EXPIRATORY PRESSURE IPEEP: 8 CMH20
PEEP END EXPIRATORY PRESSURE IPEEP: 8 CMH20
PH BLDA: 7.45 [PH] (ref 7.4–7.5)
PH BLDA: 7.47 [PH] (ref 7.4–7.5)
PH TEMP ADJ BLDA: 7.45 [PH] (ref 7.4–7.5)
PH TEMP ADJ BLDA: 7.46 [PH] (ref 7.4–7.5)
PHOSPHATE SERPL-MCNC: 4.7 MG/DL (ref 2.5–4.5)
PLATELET # BLD AUTO: 207 K/UL (ref 164–446)
PLATELET BLD QL SMEAR: NORMAL
PMV BLD AUTO: 9.9 FL (ref 9–12.9)
PO2 BLDA: 92 MMHG (ref 64–87)
PO2 BLDA: 93 MMHG (ref 64–87)
PO2 TEMP ADJ BLDA: 94 MMHG (ref 64–87)
PO2 TEMP ADJ BLDA: 96 MMHG (ref 64–87)
POLYCHROMASIA BLD QL SMEAR: NORMAL
POTASSIUM SERPL-SCNC: 3.1 MMOL/L (ref 3.6–5.5)
PREALB SERPL-MCNC: 6.5 MG/DL (ref 18–38)
PROT SERPL-MCNC: 5.5 G/DL (ref 6–8.2)
RBC # BLD AUTO: 2.79 M/UL (ref 4.2–5.4)
RBC BLD AUTO: PRESENT
SAO2 % BLDA: 98 % (ref 93–99)
SAO2 % BLDA: 98 % (ref 93–99)
SIGNIFICANT IND 70042: ABNORMAL
SIGNIFICANT IND 70042: ABNORMAL
SITE SITE: ABNORMAL
SITE SITE: ABNORMAL
SODIUM SERPL-SCNC: 130 MMOL/L (ref 135–145)
SOURCE SOURCE: ABNORMAL
SOURCE SOURCE: ABNORMAL
SPECIMEN DRAWN FROM PATIENT: ABNORMAL
SPECIMEN DRAWN FROM PATIENT: ABNORMAL
TARGETS BLD QL SMEAR: NORMAL
TIDAL VOLUME IVT: 320 ML
TIDAL VOLUME IVT: 320 ML
WBC # BLD AUTO: 21.8 K/UL (ref 4.8–10.8)

## 2021-04-19 PROCEDURE — 700105 HCHG RX REV CODE 258: Performed by: INTERNAL MEDICINE

## 2021-04-19 PROCEDURE — A9270 NON-COVERED ITEM OR SERVICE: HCPCS | Performed by: INTERNAL MEDICINE

## 2021-04-19 PROCEDURE — 82803 BLOOD GASES ANY COMBINATION: CPT

## 2021-04-19 PROCEDURE — 76705 ECHO EXAM OF ABDOMEN: CPT

## 2021-04-19 PROCEDURE — 700111 HCHG RX REV CODE 636 W/ 250 OVERRIDE (IP): Performed by: INTERNAL MEDICINE

## 2021-04-19 PROCEDURE — 99292 CRITICAL CARE ADDL 30 MIN: CPT | Performed by: INTERNAL MEDICINE

## 2021-04-19 PROCEDURE — 700102 HCHG RX REV CODE 250 W/ 637 OVERRIDE(OP): Performed by: INTERNAL MEDICINE

## 2021-04-19 PROCEDURE — 80053 COMPREHEN METABOLIC PANEL: CPT

## 2021-04-19 PROCEDURE — 84100 ASSAY OF PHOSPHORUS: CPT

## 2021-04-19 PROCEDURE — 37799 UNLISTED PX VASCULAR SURGERY: CPT

## 2021-04-19 PROCEDURE — 700101 HCHG RX REV CODE 250: Performed by: INTERNAL MEDICINE

## 2021-04-19 PROCEDURE — 94003 VENT MGMT INPAT SUBQ DAY: CPT

## 2021-04-19 PROCEDURE — 84134 ASSAY OF PREALBUMIN: CPT

## 2021-04-19 PROCEDURE — 94799 UNLISTED PULMONARY SVC/PX: CPT

## 2021-04-19 PROCEDURE — 83735 ASSAY OF MAGNESIUM: CPT

## 2021-04-19 PROCEDURE — 71045 X-RAY EXAM CHEST 1 VIEW: CPT

## 2021-04-19 PROCEDURE — 85018 HEMOGLOBIN: CPT

## 2021-04-19 PROCEDURE — 85027 COMPLETE CBC AUTOMATED: CPT

## 2021-04-19 PROCEDURE — 86140 C-REACTIVE PROTEIN: CPT | Mod: 91

## 2021-04-19 PROCEDURE — 94760 N-INVAS EAR/PLS OXIMETRY 1: CPT

## 2021-04-19 PROCEDURE — 770022 HCHG ROOM/CARE - ICU (200)

## 2021-04-19 PROCEDURE — 85007 BL SMEAR W/DIFF WBC COUNT: CPT

## 2021-04-19 PROCEDURE — 99291 CRITICAL CARE FIRST HOUR: CPT | Performed by: EMERGENCY MEDICINE

## 2021-04-19 RX ORDER — FUROSEMIDE 10 MG/ML
40 INJECTION INTRAMUSCULAR; INTRAVENOUS
Status: DISCONTINUED | OUTPATIENT
Start: 2021-04-19 | End: 2021-04-20

## 2021-04-19 RX ORDER — MAGNESIUM SULFATE HEPTAHYDRATE 40 MG/ML
2 INJECTION, SOLUTION INTRAVENOUS ONCE
Status: COMPLETED | OUTPATIENT
Start: 2021-04-19 | End: 2021-04-19

## 2021-04-19 RX ADMIN — POTASSIUM BICARBONATE 25 MEQ: 978 TABLET, EFFERVESCENT ORAL at 17:53

## 2021-04-19 RX ADMIN — DOCUSATE SODIUM 50 MG AND SENNOSIDES 8.6 MG 2 TABLET: 8.6; 5 TABLET, FILM COATED ORAL at 17:53

## 2021-04-19 RX ADMIN — RISPERIDONE 2 MG: 1 TABLET, FILM COATED ORAL at 17:53

## 2021-04-19 RX ADMIN — PROPOFOL 70 MCG/KG/MIN: 10 INJECTION, EMULSION INTRAVENOUS at 01:50

## 2021-04-19 RX ADMIN — MAGNESIUM SULFATE 2 G: 2 INJECTION INTRAVENOUS at 08:47

## 2021-04-19 RX ADMIN — NOREPINEPHRINE BITARTRATE 6 MCG/MIN: 1 INJECTION, SOLUTION, CONCENTRATE INTRAVENOUS at 17:53

## 2021-04-19 RX ADMIN — FUROSEMIDE 40 MG: 10 INJECTION, SOLUTION INTRAMUSCULAR; INTRAVENOUS at 12:03

## 2021-04-19 RX ADMIN — POTASSIUM BICARBONATE 25 MEQ: 978 TABLET, EFFERVESCENT ORAL at 10:02

## 2021-04-19 RX ADMIN — DIVALPROEX SODIUM 500 MG: 125 CAPSULE ORAL at 05:21

## 2021-04-19 RX ADMIN — PROPOFOL 20 MCG/KG/MIN: 10 INJECTION, EMULSION INTRAVENOUS at 13:36

## 2021-04-19 RX ADMIN — DORNASE ALFA 5 MG: 1 SOLUTION RESPIRATORY (INHALATION) at 10:02

## 2021-04-19 RX ADMIN — PROPOFOL 60 MCG/KG/MIN: 10 INJECTION, EMULSION INTRAVENOUS at 08:53

## 2021-04-19 RX ADMIN — HEPARIN SODIUM 5000 UNITS: 5000 INJECTION, SOLUTION INTRAVENOUS; SUBCUTANEOUS at 21:26

## 2021-04-19 RX ADMIN — Medication 200 MCG/HR: at 11:34

## 2021-04-19 RX ADMIN — DIVALPROEX SODIUM 500 MG: 125 CAPSULE ORAL at 21:27

## 2021-04-19 RX ADMIN — ALTEPLASE 5 MG: KIT at 10:03

## 2021-04-19 RX ADMIN — Medication 400 MCG: at 21:34

## 2021-04-19 RX ADMIN — DIVALPROEX SODIUM 500 MG: 125 CAPSULE ORAL at 14:03

## 2021-04-19 RX ADMIN — POTASSIUM BICARBONATE 25 MEQ: 978 TABLET, EFFERVESCENT ORAL at 12:03

## 2021-04-19 RX ADMIN — HEPARIN SODIUM 5000 UNITS: 5000 INJECTION, SOLUTION INTRAVENOUS; SUBCUTANEOUS at 05:20

## 2021-04-19 RX ADMIN — ALTEPLASE 5 MG: KIT at 16:23

## 2021-04-19 RX ADMIN — DORNASE ALFA 5 MG: 1 SOLUTION RESPIRATORY (INHALATION) at 16:23

## 2021-04-19 RX ADMIN — PROPOFOL 70 MCG/KG/MIN: 10 INJECTION, EMULSION INTRAVENOUS at 05:27

## 2021-04-19 RX ADMIN — NAFCILLIN SODIUM 2 G: 2 INJECTION, POWDER, FOR SOLUTION INTRAMUSCULAR; INTRAVENOUS at 22:14

## 2021-04-19 RX ADMIN — NAFCILLIN SODIUM 2 G: 2 INJECTION, POWDER, FOR SOLUTION INTRAMUSCULAR; INTRAVENOUS at 17:53

## 2021-04-19 RX ADMIN — NAFCILLIN SODIUM 2 G: 2 INJECTION, POWDER, FOR SOLUTION INTRAMUSCULAR; INTRAVENOUS at 06:14

## 2021-04-19 RX ADMIN — VASOPRESSIN 0.03 UNITS/MIN: 20 INJECTION INTRAVENOUS at 12:00

## 2021-04-19 RX ADMIN — HEPARIN SODIUM 5000 UNITS: 5000 INJECTION, SOLUTION INTRAVENOUS; SUBCUTANEOUS at 14:03

## 2021-04-19 RX ADMIN — FAMOTIDINE 20 MG: 20 TABLET ORAL at 17:53

## 2021-04-19 RX ADMIN — DULOXETINE HYDROCHLORIDE 60 MG: 60 CAPSULE, DELAYED RELEASE ORAL at 17:53

## 2021-04-19 RX ADMIN — PAROXETINE HYDROCHLORIDE 10 MG: 20 TABLET, FILM COATED ORAL at 05:21

## 2021-04-19 RX ADMIN — DOCUSATE SODIUM 50 MG AND SENNOSIDES 8.6 MG 2 TABLET: 8.6; 5 TABLET, FILM COATED ORAL at 05:20

## 2021-04-19 RX ADMIN — Medication 400 MCG/HR: at 16:21

## 2021-04-19 RX ADMIN — NAFCILLIN SODIUM 2 G: 2 INJECTION, POWDER, FOR SOLUTION INTRAMUSCULAR; INTRAVENOUS at 14:03

## 2021-04-19 RX ADMIN — FAMOTIDINE 20 MG: 20 TABLET ORAL at 05:21

## 2021-04-19 RX ADMIN — DULOXETINE HYDROCHLORIDE 60 MG: 60 CAPSULE, DELAYED RELEASE ORAL at 05:22

## 2021-04-19 RX ADMIN — RISPERIDONE 2 MG: 1 TABLET, FILM COATED ORAL at 05:21

## 2021-04-19 RX ADMIN — NAFCILLIN SODIUM 2 G: 2 INJECTION, POWDER, FOR SOLUTION INTRAMUSCULAR; INTRAVENOUS at 02:41

## 2021-04-19 RX ADMIN — POTASSIUM BICARBONATE 25 MEQ: 978 TABLET, EFFERVESCENT ORAL at 12:07

## 2021-04-19 RX ADMIN — NAFCILLIN SODIUM 2 G: 2 INJECTION, POWDER, FOR SOLUTION INTRAMUSCULAR; INTRAVENOUS at 10:02

## 2021-04-19 ASSESSMENT — PAIN DESCRIPTION - PAIN TYPE
TYPE: ACUTE PAIN

## 2021-04-19 ASSESSMENT — FIBROSIS 4 INDEX: FIB4 SCORE: 1.55

## 2021-04-19 NOTE — CARE PLAN
Problem: Safety  Goal: Will remain free from injury  Outcome: PROGRESSING AS EXPECTED  Goal: Will remain free from falls  Outcome: PROGRESSING AS EXPECTED     Problem: Bowel/Gastric:  Goal: Normal bowel function is maintained or improved  Outcome: PROGRESSING AS EXPECTED     Problem: Safety - Medical Restraint  Goal: Remains free of injury from restraints (Restraint for Interference with Medical Device)  Description: INTERVENTIONS:  1. Determine that other, less restrictive measures have been tried or would not be effective before applying the restraint  2. Evaluate the patient's condition at the time of restraint application  3. Inform patient/family regarding the reason for restraint  4. Q2H: Monitor safety, psychosocial status, comfort, nutrition and hydration  Outcome: PROGRESSING AS EXPECTED     Problem: Respiratory:  Goal: Respiratory status will improve  Outcome: PROGRESSING AS EXPECTED

## 2021-04-19 NOTE — DIETARY
"Nutrition Support Assessment   Day 3 of admit.  Enedelia Pang is a 50 y.o. female with admitting DX of empyema, endocarditis, and sepsis d/t port line infection.      Current problem list:  1. Acute respiratory failure with hypoxia  2. Septic shock  3. Acute encephalopathy  4. CSF pleocytosis  5. Bacteremia   6. Pneumonia  7. S/p  shunt     Assessment:  Estimated Nutritional Needs: based on:   Height: 160 cm (5' 2.99\")  Weight: 80.8 kg (178 lb 2.1 oz)  Weight to Use in Calculations: 75 kg (165 lb 5.5 oz) - via bed scale; suspect closer to dry/actual wt.   Ideal Body Weight: 52.2 kg (115 lb)  BMI with wt used in calculation: 29.29 - Overweight.     Calculation/Equation: MSJ x 1.1 - 1.2 = 1485 - 1620 kcal.  PSU = 1547 kcal (VE: 6.5, Tmax over the past 24 hours: 37.5).   Total Calories / day: 1500 - 1700 (Calories / k - 23)  Total Grams Protein / day: 90 - 113 (Grams Protein / k.2 - 1.5)     Evaluation:   1. Vent day #4, consult received for TF recommendations.    2. Gastric Cortrak for enteral access.  3. Complicated PMHx reviewed - transferred from [facility]   for Thoracic Surgery eval.  Additional PMHx reviewed @ this time.    4. Pt underwent R chest tube placement .  Surgery note(s) reviewed.  5. Current clinical picture and MD progress notes reviewed.    6. No staged wounds noted.  Per flowsheet, pt with 1+ general edema, and dependent 2+ edema to BUE.   7. Labs: Sodium: 130, Potassium: 3.1, Glucose: 136, Total bilirubin: 1.9,  Albumin: 1.7, Phosphorus: 4.7, Prealb: 6.5, CRP: 20.48 - will continue to monitor trends.  8. Meds: Depakote sprinkle, Pepcid, Electrolyte replacement, Klyte.  9. Levophed running @ 3 mcg/min,  Vasopressin @ 0.03 units/min, and Propofol running @ 70 mcg/kg/min (31.5), providing 831 kcal.   10. LBM:  - large, brown, soft.   11. Peptamen Intense VHP is an appropriate formula to meet estimated protein needs with current Propofol infusion.      Malnutrition Risk: " Unable to be determined @ this time.      Recommendations/Plan:  1. Initiate Peptamen Intense VHP @ 25 mL/hr and advance per protocol to rate of 40 mL/hr while Propofol is infusing, providing 960 kcal (+kcal from Propofol), 88 gm protein, 73 gm CHO, and 806 mL of free water per day.  2. Fluids per MD.  3. Once Propofol is d/c'd, adjust TF formula and goal rate to Impact Peptide 1.5 @ 45 mL/hr, providing 1620 kcal, 102 gm protein, 151 gm CHO, and 832 mL of free water per day.  4. Continue to monitor wt.  5. RD continues to monitor labs and Propofol infusion daily.    RD follows.

## 2021-04-19 NOTE — PROGRESS NOTES
RCC    Called by RN for drop in hemoglobin to 7.0, no clear bleeding site clinically  Status post TPA/dornase today for first time for empyema, query hemothorax as well  Chest tube output 1640 on the day shift, only 50 cc so far on the night shift  Patient remains on the ventilator on full support  Hemodynamics noted, still soft BP on 50 of propofol requiring norepinephrine at 6 mics with vasopressin 0.03  We will transfuse 1 unit of blood and repeat H&H 1 hour after transfusion complete  Hypotension followed intubation and initiation of pressors and drop in hemoglobin followed placement of chest tube at [facility]  Patient may benefit from thoracic surgery consultation and decortication, chest x-ray and CT scan pretty impressive for large loculated right-sided effusion, may be faster to clear R chest with VATS then wean sedation/pressors and perform SBT

## 2021-04-19 NOTE — PROGRESS NOTES
Infectious Disease Daily Progress Note    Date of Service  4/19/2021    Chief Complaint  Nafcillin 2g Q4 hrs 4/15-present    Previous: Unasyn, Zosyn, Vanco, Daptomycin  Ceftaroline: start 4/13-4/15    4/14: Unable to give name of previous NSG or neurologist. Seems confused, but able to say some sentences fluently.   4/15: Line cultures now growing MSSA. As well as from the blood. Repeat blood culture 4/13+ in both sets. Patient has worsening confusion. CSF fluid analyses suggest pleocytosis. Cultures are no growth from the CSF. Chest CT was ordered this morning indicating a loculated right pleural effusion as well as bilateral septic emboli. Dr. Mack spoke with Dr. Oh yesterday and no surgical intervention unless clinical deterioration.  4/16: Increased respiratory distress.  Tachypneic.  CT with loculated collection.  Dr Resendiz not available.  No thoracic surgeon available.  Plans to have pulmonary place CT.  Transferring to ICU.  4/17: Transferred to [facility] last night. Hgb dropped to 6.4 requiring PRBC transfusion. Requiring 2 pressors. Fio2 50%. Febrile 38.8. Pending OR decortication of empyema and hemothorax. Chest tube placed at [facility] shows 8,371 WBC, ,000, 74% N  4/18: Chest tube was upsized by surgery yesterday, no decortication. WBC 22k. Fio2 40%. Vasopressin. Levophed down to 2mcg. Afebrile 24 hrs. Last fever 38.6 on 4/16.   4/19: Still on vent. Levo 3 mcg, vasopressin. Afebrile 72 hours. WBC 21k. BC 4/17 NGTD x 48 hours. Unable to do MRI brain given neurostimulator per RN.     Review of Systems  Review of Systems   Unable to perform ROS: Acuity of condition        Physical Exam  Temp:  [37.4 °C (99.3 °F)] 37.4 °C (99.3 °F)  Pulse:  [69-87] 83  Resp:  [15-23] 22  BP: ()/(29-63) 104/52  SpO2:  [96 %-100 %] 97 %    Physical Exam  Constitutional:       Comments: Intubated sedated . Exam unchanged  HENT:      Head: Normocephalic.      Comments: ET tube  Cardiovascular:      Rate and Rhythm:  Normal rate and regular rhythm.      Heart sounds: No murmur.   Pulmonary:      Comments: Diminished R side  Abdominal:      General: Abdomen is flat. Bowel sounds are normal. There is no distension.   Musculoskeletal:         General: No swelling or deformity.   Skin:     Comments: R chest tube. Stimulator palpable, non induratated  Fluids    Intake/Output Summary (Last 24 hours) at 4/19/2021 1044  Last data filed at 4/19/2021 1000  Gross per 24 hour   Intake 1890.76 ml   Output 3005 ml   Net -1114.24 ml       Laboratory  Recent Labs     04/17/21  0815 04/17/21  1300 04/18/21  0205 04/18/21  0845 04/18/21  1945 04/19/21  0038 04/19/21  0400   WBC 16.8*  --  22.8*  --   --   --  21.8*   RBC 2.88*  --  2.68*  --   --   --  2.79*   HEMOGLOBIN 7.8*   < > 7.5*   < > 7.0* 8.1* 7.8*   HEMATOCRIT 22.8*  --  21.5*  --   --   --  22.7*   MCV 79.2*  --  80.2*  --   --   --  81.4   MCH 27.1  --  28.0  --   --   --  28.0   MCHC 34.2  --  34.9  --   --   --  34.4   RDW 54.2*  --  54.4*  --   --   --  53.6*   PLATELETCT 80*  --  125*  --   --   --  207   MPV 10.4  --  9.8  --   --   --  9.9    < > = values in this interval not displayed.     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   -Acute septic emboli bilaterally  -Acute bilateral pneumonia due to above  -Pseudotumor cerebri with underlying  shunt  -Chronic migraine headaches  -History of autoimmune  vasculitis  -Elevated alkaline phosphatase  -Acute encephalopathy due to sepsis staphylococcal, meningitis ruled out      - hemothorax R      - anemia due to above     Plan   Patient has evidence of disseminated staphylococcal sepsis source from her line with infection to her lungs, tricuspid valve and now has R empyema requiring transfer to [facility] for decortication due to lack of CT surgery support     - Continue Nafcillin for CNS penetration for now. Initial CSF does not show ventriculitis, but cannot rule out septic brain emboli and brain abscess. Unfortunately has a stimulator that is non compatible with MRI.   - US soft tissue to rule out stimulator abscess  - initial CSF cultures has pleocytosis, but no culture growth  - chest tube upsized by surgery 4/17.   - thoracentesis and pleural fluid consistent with empyema and hemothorax  - blood culture from 4/14 at [facility] still positive. 4/17 blood cultures NGTD  - has retained R central line. Will need to be exchanged once blood cultures are NGTD 72 hours  - Continue to follow CSF and pleural cultures  - original infected port removed 4/12  - Neurosurgery Dr Oh was notified and no surgical intervention for  shunt  - vent management per pulmonology       45 min spent with 50% face to face care and critical care time involvement  Thank you for this consult. Case discussed with RN.

## 2021-04-19 NOTE — PROGRESS NOTES
Critical Care Event Note    Called to the bedside for new air leak to right sided chest tube.  Just prior to air leak development there were increased peak pressures on the ventilator to the 30s which resolved following the air leak and from suctioning thick secretions.  Will obtain urgent CXR.  Peak pressures on ventilator now 15-20 with adequate oxygen saturations.  Right chest tube air leak is intermittent.    Patient is critically ill.  The critical that has been undertaken is medically complex.  There has been no overlap in critical care time.  Critical Care Time not including procedures: 35 minutes

## 2021-04-19 NOTE — PROGRESS NOTES
"    DATE: 4/19/2021    Hospital Day  3 right empyema and severe septic shock    Interval Events:  Patient remains quite ill valent ventilator  2 pressors for septic shock  Continues to get TPA and dornase via chest tube -good effect yesterday with 1400 cc out    PHYSICAL EXAMINATION:  Vital Signs: /62   Pulse 85   Temp 37.4 °C (99.3 °F)   Resp (!) 24   Ht 1.6 m (5' 2.99\")   Wt 80.8 kg (178 lb 2.1 oz)   SpO2 98%     Serosanguineous output on chest tube, mechanically ventilated, on levophed and vasopressin    ASSESSMENT AND PLAN:   Right empyema, septic shock, respiratory failure, MSSA bacterial endocarditis, septic emboli to lungs.    Recommend continue TPA and dornase twice daily.  Follow daily chest x-rays.  Patient extremely poor surgical candidate.    Appreciate ICU and consulting physician care.       ____________________________________     Dillan Contreras M.D.    DD: 4/19/2021  11:23 AM    "

## 2021-04-19 NOTE — PROGRESS NOTES
"Critical Care Progress Note    Date of admission  4/16/2021    Chief Complaint  \"50 y.o. female the past medical history of pseudotumor cerebri status post  shunt by Dr. Oh, chronic pain with history of placement of nerve stimulator, vasculitis, right anterior chest port for home IV meds with recurrent infection who presented 4/11/2021 with to [facility] with recurrent port infection and was initially treated with vancomycin and Zosyn and then changed to ceftaroline and subsequently switched to nafcillin when MSSA was identified.  [facility] infectious disease is managing antibiotics.  Dr. Candelaria removed the port on 4/12.  Imaging suggest tricuspid valve endocarditis.  Lumbar puncture performed 4/14 showed pleocytosis with negative Gram stain and culture so far.  She had worsening leukocytosis with WBC up to 35K and chest CT yesterday revealed septic emboli with greater involvement in the right lung as well as a large right pleural effusion.  IR performed a thoracentesis and placed a small pigtail catheter in the right chest which is now at 20 cm of suction.  There were significant loculations and fluid analysis suggested empyema.  Patient subsequently developed increased work of breathing and was intubated and placed on the ventilator yesterday.  Hemoglobin dropped from 8.3-6.9 as they were considering TPA and dornase via this pigtail catheter and instead they transfused 1 unit of blood.  The plan was to consult thoracic surgery but their only surgeon is out of town till next week.  Dr. Oh consulted on 4/14 and recommended brain MRI and it was ordered but is still not performed (nerve stimulator not MRI compatible?).  Dr. Oh did not recommend removal of shunt at this time apparently.  The patient's had some hypotension while on the ventilator requiring titration of norepinephrine and vasopressin.  The patient is transferred to [facility] for surgery consultation and Dr. Albert Benedict excepted the patient along with  " "[place] on transfer.\"    Hospital Course  4/16 admitted to ICU  4/17 VD 2, 2 FFP, pRBC overnight; new chest tube per gen surg  4/18 VD 3, TPa/Dornase with 1400 cc from R chest tube    Interval Problem Update  Reviewed last 24 hour events:  prbc given overnight for hb 7  tpa dornase given this am  Bloody output from chest tube  Continue bid for 6 total doses  Surgery consulted on this patient prior  On pressors for map > 65 and sbp > 90  Propofol and fentanyl drips for sedation  Changed to bid h/h    Review of Systems  Review of Systems   Unable to perform ROS: Intubated        Vital Signs for last 24 hours   Temp:  [37.4 °C (99.3 °F)] 37.4 °C (99.3 °F)  Pulse:  [69-87] 83  Resp:  [15-23] 22  BP: ()/(29-63) 104/52  SpO2:  [96 %-100 %] 97 %    Hemodynamic parameters for last 24 hours  CVP:  [243 MM HG-277 MM HG] 243 MM HG    Respiratory Information for the last 24 hours  Vent Mode: APVCMV  Rate (breaths/min): 20  Vt Target (mL): 320  PEEP/CPAP: 8  MAP: 10  Control VTE (exp VT): 343    Physical Exam   Physical Exam  Vitals and nursing note reviewed.   Constitutional:       Appearance: She is ill-appearing.   HENT:      Head: Normocephalic and atraumatic.      Right Ear: External ear normal.      Left Ear: External ear normal.      Nose: Nose normal.      Mouth/Throat:      Mouth: Mucous membranes are moist.   Eyes:      Pupils: Pupils are equal, round, and reactive to light.   Cardiovascular:      Rate and Rhythm: Normal rate and regular rhythm.      Pulses: Normal pulses.      Heart sounds: Murmur present. No gallop.       Comments: R sided chest tube  Pulmonary:      Comments: Equal and symmetric breath sounds to mechanical ventilation  Abdominal:      General: Abdomen is flat. There is no distension.      Palpations: Abdomen is soft.      Tenderness: There is no abdominal tenderness. There is no guarding or rebound.   Musculoskeletal:      Cervical back: No rigidity.      Right lower leg: No edema.      Left " lower leg: No edema.   Lymphadenopathy:      Cervical: No cervical adenopathy.   Skin:     General: Skin is warm and dry.      Capillary Refill: Capillary refill takes less than 2 seconds.   Neurological:      Comments: Grimaces, moves all 4 extremities         Medications  Current Facility-Administered Medications   Medication Dose Route Frequency Provider Last Rate Last Admin   • potassium bicarbonate (KLYTE) effervescent tablet 25 mEq  25 mEq Enteral Tube Q4HRS Viraj Burgos M.D.   25 mEq at 04/19/21 1002   • magnesium sulfate IVPB premix 2 g  2 g Intravenous Once Viraj Burgos M.D. 25 mL/hr at 04/19/21 0847 2 g at 04/19/21 0847   • heparin injection 5,000 Units  5,000 Units Subcutaneous Q8HRS Elliott Salvador M.D.   5,000 Units at 04/19/21 0520   • alteplase (Activase) 5 mg in NS 30 mL INTRAPLEURAL syringe  5 mg Intrapleural TWICE DAILY Elliott Salvador M.D.   5 mg at 04/19/21 1003    And   • dornase alpha (PULMOZYME) 5 mg in NS 30 mL INTRAPLEURAL syringe  5 mg Intrapleural TWICE DAILY Elliott Salvador M.D.   5 mg at 04/19/21 1002   • alteplase (Activase) 5 mg in NS 30 mL INTRAPLEURAL syringe  5 mg Intrapleural Once Elliott Salvador M.D.   Stopped at 04/18/21 1015    And   • dornase alpha (PULMOZYME) 5 mg in NS 30 mL INTRAPLEURAL syringe  5 mg Intrapleural Once Elliott Salvador M.D.   Stopped at 04/18/21 0115   • Pharmacy Consult: Enteral tube insertion - review meds/change route/product selection  1 Each Other PHARMACY TO DOSE Elliott Salvador M.D.       • acetaminophen (Tylenol) tablet 650 mg  650 mg Enteral Tube Q4HRS PRN Elliott Salvador M.D.       • magnesium hydroxide (MILK OF MAGNESIA) suspension 30 mL  30 mL Enteral Tube QDAY PRN Elliott Salvador M.D.        And   • senna-docusate (PERICOLACE or SENOKOT S) 8.6-50 MG per tablet 2 tablet  2 tablet Enteral Tube BID Elliott Salvador M.D.   2 tablet at 04/19/21 0520    And   • polyethylene glycol/lytes (MIRALAX) PACKET 1 Packet  1 Packet Enteral  Tube QDAY PRN Elliott Salvador M.D.        And   • bisacodyl (DULCOLAX) suppository 10 mg  10 mg Rectal QDAY PRN Elliott Salvador M.D.       • divalproex (DEPAKOTE SPRINKLE) capsule 500 mg  500 mg Enteral Tube Q8HRS Elliott Salvador M.D.   500 mg at 04/19/21 0521   • DULoxetine (CYMBALTA) capsule 60 mg  60 mg Enteral Tube BID Elliott Salvador M.D.   60 mg at 04/19/21 0522   • PARoxetine (PAXIL) tablet 10 mg  10 mg Enteral Tube QAM Elliott Salvador M.D.   10 mg at 04/19/21 0521   • risperiDONE (RISPERDAL) tablet 2 mg  2 mg Enteral Tube BID Elliott Salvador M.D.   2 mg at 04/19/21 0521   • Respiratory Therapy Consult   Nebulization Continuous RT Man Wahl M.D.       • famotidine (PEPCID) tablet 20 mg  20 mg Enteral Tube Q12HRS Man Wahl M.D.   20 mg at 04/19/21 0521    Or   • famotidine (PEPCID) injection 20 mg  20 mg Intravenous Q12HRS Man Wahl M.D.   20 mg at 04/17/21 1747   • MD Alert...ICU Electrolyte Replacement per Pharmacy   Other PHARMACY TO DOSE Man Wahl M.D.       • lidocaine (XYLOCAINE) 1 % injection 1-2 mL  1-2 mL Tracheal Tube Q30 MIN PRN Man Wahl M.D.       • ipratropium-albuterol (DUONEB) nebulizer solution  3 mL Nebulization Q2HRS PRN (RT) Man Wahl M.D.       • fentaNYL (SUBLIMAZE) injection 25 mcg  25 mcg Intravenous Q HOUR PRN Man Wahl M.D.        Or   • fentaNYL (SUBLIMAZE) injection 50 mcg  50 mcg Intravenous Q HOUR PRN Man Wahl M.D.        Or   • fentaNYL (SUBLIMAZE) injection 100 mcg  100 mcg Intravenous Q HOUR PRN Man Wahl M.D.   100 mcg at 04/17/21 0523   • fentaNYL (SUBLIMAZE) 50 mcg/mL in 50mL   Intravenous Continuous Man Wahl M.D. 2 mL/hr at 04/17/21 0312 100 mcg at 04/18/21 1952   • propofol (DIPRIVAN) injection  0-80 mcg/kg/min Intravenous Continuous Man Wahl M.D. 27 mL/hr at 04/19/21 0853 60 mcg/kg/min at 04/19/21 0853   • norepinephrine (Levophed) infusion 8 mg/250 mL  (premix)  0-30 mcg/min Intravenous Continuous Man Wahl M.D. 5.6 mL/hr at 04/19/21 0058 3 mcg/min at 04/19/21 0058   • vasopressin (VASOSTRICT) 20 Units in  mL Infusion  0.03 Units/min Intravenous Continuous Man Wahl M.D. 9 mL/hr at 04/18/21 2305 0.03 Units/min at 04/18/21 2305   • nafcillin 2 g in dextrose 5% 100 mL IVPB  2 g Intravenous Q4HR Man Wahl M.D. 200 mL/hr at 04/19/21 1002 2 g at 04/19/21 1002       Fluids    Intake/Output Summary (Last 24 hours) at 4/19/2021 1009  Last data filed at 4/19/2021 1000  Gross per 24 hour   Intake 1777.76 ml   Output 2985 ml   Net -1207.24 ml       Laboratory  Recent Labs     04/17/21  0326 04/18/21  0445 04/19/21  0429   ISTATAPH 7.437 7.472 7.453   ISTATAPCO2 40.1* 35.7 36.8   ISTATAPO2 70 92* 93*   ISTATATCO2 28 27 27   [id number] 94 98 98   ISTATARTHCO3 27.0* 26.1* 25.7*   ISTATARTBE 3 2 2   ISTATTEMP see below 37.6 C 37.3 C   ISTATFIO2  --  40 30   ISTATSPEC Arterial Arterial Arterial   ISTATAPHTC  --  7.463 7.449   [id number]  --  96* 94*         Recent Labs     04/17/21  0518 04/18/21  0205 04/19/21  0400   SODIUM 134* 129* 130*   POTASSIUM 3.0* 3.4* 3.1*   CHLORIDE 96 96 96   CO2 28 26 26   BUN 15 13 13   CREATININE 0.79 0.54 0.56   MAGNESIUM 2.2 1.8 1.8   PHOSPHORUS 5.2* 4.4 4.7*   CALCIUM 6.9* 7.0* 7.0*     Recent Labs     04/17/21  0518 04/18/21  0205 04/19/21  0400   ALTSGPT  --   --  16   ASTSGOT  --   --  16   ALKPHOSPHAT  --   --  599*   TBILIRUBIN  --   --  1.9*   PREALBUMIN  --   --  6.5*   GLUCOSE 118* 85 136*     Recent Labs     04/17/21  0518 04/17/21  0518 04/17/21  0815 04/18/21  0205 04/19/21  0400   WBC 18.0*   < > 16.8* 22.8* 21.8*   NEUTSPOLYS 91.30*  --   --  85.10* 86.10*   LYMPHOCYTES 0.90*  --   --  3.50* 2.60*   MONOCYTES 6.10  --   --  4.40 6.10   EOSINOPHILS 0.00  --   --  0.00 0.00   BASOPHILS 0.00  --   --  0.00 0.00   ASTSGOT  --   --   --   --  16   ALTSGPT  --   --   --   --  16   ALKPHOSPHAT  --   --    --   --  599*   TBILIRUBIN  --   --   --   --  1.9*    < > = values in this interval not displayed.     Recent Labs     04/17/21  0815 04/17/21  1300 04/18/21  0205 04/18/21  0845 04/18/21  1945 04/19/21  0038 04/19/21  0400   RBC 2.88*  --  2.68*  --   --   --  2.79*   HEMOGLOBIN 7.8*   < > 7.5*   < > 7.0* 8.1* 7.8*   HEMATOCRIT 22.8*  --  21.5*  --   --   --  22.7*   PLATELETCT 80*  --  125*  --   --   --  207    < > = values in this interval not displayed.       Imaging  X-Ray:  I have personally reviewed the images and compared with prior images.    Assessment/Plan  * Septic shock (HCC)  Assessment & Plan  This is Septic shock Present on admission  SIRS criteria identified on my evaluation include: Tachypnea, with respirations greater than 20 per minute and Leukocytosis, with WBC greater than 12,000  Source is right anterior chest port, MSSA  Suspected onset of infection unknown  Patient developed hypotension at transferring facility [facility], resuscitation complete there, currently titrating norepinephrine/vasopressin MAP > 65  IV antibiotics as appropriate for source of sepsis  Reassessment: I have reassessed the patient's hemodynamic status    MSSA endocarditis + R lung empyema and bacteremia   Nafcillin   Follow repeat cultures  NE + vaso for MAP > 65      Pleural effusion, right -concern of empyema/hemothorax  Assessment & Plan  Right-sided effusion associated with infected catheter right anterior chest and septic emboli, possible empyema  Status post chest tube placement 4/16  At [facility] complicated by some bleeding  Alteplase dornase considered via chest tube but held secondary to drop in hemoglobin requiring transfusion  Pleural studies reveal marked elevated white count as well as RBC count and primarily segs/bands    Plan:  Daily CXR  Nafcillin  4/17 Dr. Albert Benedict placed new R sided chest tube  4/18 TPa/Dornase BID x 3 days initiated     Right-sided acute bacterial endocarditis  Assessment & Plan  Cultures  growing MSSA at [facility]  Infected right anterior chest port/line removed  CTA chest 4/15 with multiple septic emboli worse on the right complicated by large right effusion with loculations  Echocardiogram 4/12 reveals echogenic mobile lesion on tricuspid valve RVSP 30, EF 55%  ID managing antibiotics  Monitor for the need for cardiology consultation and MICAH    Acute respiratory failure with hypoxia (HCC)  Assessment & Plan  Intubated 4/15 for respiratory failure at [facility]    Plan:  Lung protective ventilation strategies  Optimize oxygenation, ventilation, and acid base balance.   ABCEDF bundle  SAT/SBT   Sedation Lite    Acute encephalopathy- (present on admission)  Assessment & Plan  Multifactorial including septic and metabolic  Status post lumbar puncture with cytosis but negative Gram stain and cultures NGTD   shunt for pseudotumor cerebri    Plan:  MRI pending    Pneumonia  Assessment & Plan  MSSA septic emboli with empyema     Bacteremia due to methicillin susceptible Staphylococcus aureus (MSSA)  Assessment & Plan  Source presumed right anterior chest line/port  Recurrent line/port infection  Blood cultures positive for MSSA    Plan:  CT imaging suggestive of empyema and septic emboli    Repeat blood cultures every 48 hours until negative  Nafcillin 2 g IV every 4  ID following  Formal ECHO pending    CSF pleocytosis  Assessment & Plan  Status post lumbar puncture 4/13  WBC 53  Glucose 47, protein 97 Gram stain negative and culture negative so far  Neurosurgery aware,  shunt in place  MRI brain requested by neurosurgery at [facility]      Acute blood loss anemia  Assessment & Plan  Hemoglobin dropped from 8.3->6.4 on 4/16, repeat hemoglobin pending  Status post transfusion 1 unit of blood 4/16 at [facility]    Plan:  Q6 hour H/H  Conservative transfusion goal to hemoglobin > 7  Reverse coagulopathy     Thrombocytopenia (HCC)- (present on admission)  Assessment & Plan  Sepsis induced  Improving  No antiplatelets/AC until >  50    S/P  shunt- (present on admission)  Assessment & Plan  History of pseudotumor cerebri  History of  shunt by Dr. Oh  MRI brain pending, ordered 4/14 by Dr Oh; awaiting MRI compatibility clarification  4/14 lumbar puncture did reveal pleocytosis but Gram stain negative cultures NGTD  Follow CSF cultures, ID following    History of vasculitis  Assessment & Plan  Monitor    Prolonged Q-T interval on ECG  Assessment & Plan  Avoid QT prolonging agents as able  Optimize electrolytes  Cardiac monitoring  EKG    History of complicated migraines- (present on admission)  Assessment & Plan  Resume home regimen when clinically appropriate    H/O: CVA (cerebrovascular accident)- (present on admission)  Assessment & Plan  Monitor      Chronic pain syndrome- (present on admission)  Assessment & Plan  Chronic back pain  History of nerve stimulator     Plan:  Resume home regimen as clinically appropriate       VTE:  Heparin SQ  Ulcer: H2 Antagonist  Lines: Central Line  Ongoing indication addressed, Arterial Line  Ongoing indication addressed and Lopez Catheter  Ongoing indication addressed    I have performed a physical exam and reviewed and updated ROS and Plan today (4/19/2021). In review of yesterday's note (4/18/2021), there are no changes except as documented above.     Discussed patient condition and risk of morbidity and/or mortality with Hospitalist, Family, RN, RT, Pharmacy and Charge nurse / hot rounds     The patient remains critically ill.  On mechanical ventilator.  Propofol and fentanyl drips for sedation.  Levophed for map > 65 and sbp > 90.  Critical care time = 75 minutes in directly providing and coordinating critical care and extensive data review.  No time overlap and excludes procedures.

## 2021-04-20 ENCOUNTER — APPOINTMENT (OUTPATIENT)
Dept: RADIOLOGY | Facility: MEDICAL CENTER | Age: 51
DRG: 003 | End: 2021-04-20
Attending: INTERNAL MEDICINE
Payer: MEDICARE

## 2021-04-20 ENCOUNTER — HOSPITAL ENCOUNTER (OUTPATIENT)
Dept: RADIOLOGY | Facility: MEDICAL CENTER | Age: 51
End: 2021-04-20
Attending: INTERNAL MEDICINE
Payer: MEDICARE

## 2021-04-20 LAB
ABO GROUP BLD: NORMAL
ANION GAP SERPL CALC-SCNC: 8 MMOL/L (ref 7–16)
ANISOCYTOSIS BLD QL SMEAR: ABNORMAL
BARCODED ABORH UBTYP: 6200
BARCODED PRD CODE UBPRD: NORMAL
BARCODED UNIT NUM UBUNT: NORMAL
BASE EXCESS BLDA CALC-SCNC: 5 MMOL/L (ref -4–3)
BASOPHILS # BLD AUTO: 0 % (ref 0–1.8)
BASOPHILS # BLD: 0 K/UL (ref 0–0.12)
BLD GP AB SCN SERPL QL: NORMAL
BODY TEMPERATURE: ABNORMAL DEGREES
BREATHS SETTING VENT: 20
BUN SERPL-MCNC: 14 MG/DL (ref 8–22)
CALCIUM SERPL-MCNC: 6.8 MG/DL (ref 8.5–10.5)
CHLORIDE SERPL-SCNC: 93 MMOL/L (ref 96–112)
CO2 BLDA-SCNC: 30 MMOL/L (ref 20–33)
CO2 SERPL-SCNC: 27 MMOL/L (ref 20–33)
COMPONENT R 8504R: NORMAL
CREAT SERPL-MCNC: 0.59 MG/DL (ref 0.5–1.4)
DELSYS IDSYS: ABNORMAL
END TIDAL CARBON DIOXIDE IECO2: 37 MMHG
EOSINOPHIL # BLD AUTO: 0 K/UL (ref 0–0.51)
EOSINOPHIL NFR BLD: 0 % (ref 0–6.9)
ERYTHROCYTE [DISTWIDTH] IN BLOOD BY AUTOMATED COUNT: 55.6 FL (ref 35.9–50)
ERYTHROCYTE [DISTWIDTH] IN BLOOD BY AUTOMATED COUNT: 56.8 FL (ref 35.9–50)
GLUCOSE SERPL-MCNC: 107 MG/DL (ref 65–99)
HCO3 BLDA-SCNC: 28.6 MMOL/L (ref 17–25)
HCT VFR BLD AUTO: 17.4 % (ref 37–47)
HCT VFR BLD AUTO: 18.2 % (ref 37–47)
HGB BLD-MCNC: 6 G/DL (ref 12–16)
HGB BLD-MCNC: 6.2 G/DL (ref 12–16)
HGB BLD-MCNC: 7.4 G/DL (ref 12–16)
HOROWITZ INDEX BLDA+IHG-RTO: 233 MM[HG]
HYPOCHROMIA BLD QL SMEAR: ABNORMAL
LYMPHOCYTES # BLD AUTO: 1.47 K/UL (ref 1–4.8)
LYMPHOCYTES NFR BLD: 6.1 % (ref 22–41)
MACROCYTES BLD QL SMEAR: ABNORMAL
MAGNESIUM SERPL-MCNC: 2 MG/DL (ref 1.5–2.5)
MANUAL DIFF BLD: NORMAL
MCH RBC QN AUTO: 27.6 PG (ref 27–33)
MCH RBC QN AUTO: 28.3 PG (ref 27–33)
MCHC RBC AUTO-ENTMCNC: 34.1 G/DL (ref 33.6–35)
MCHC RBC AUTO-ENTMCNC: 34.1 G/DL (ref 33.6–35)
MCV RBC AUTO: 80.9 FL (ref 81.4–97.8)
MCV RBC AUTO: 83 FL (ref 81.4–97.8)
METAMYELOCYTES NFR BLD MANUAL: 0.9 %
MICROCYTES BLD QL SMEAR: ABNORMAL
MODE IMODE: ABNORMAL
MONOCYTES # BLD AUTO: 0.41 K/UL (ref 0–0.85)
MONOCYTES NFR BLD AUTO: 1.7 % (ref 0–13.4)
MORPHOLOGY BLD-IMP: NORMAL
NEUTROPHILS # BLD AUTO: 22 K/UL (ref 2–7.15)
NEUTROPHILS NFR BLD: 86.1 % (ref 44–72)
NEUTS BAND NFR BLD MANUAL: 5.2 % (ref 0–10)
NRBC # BLD AUTO: 0 K/UL
NRBC BLD-RTO: 0 /100 WBC
O2/TOTAL GAS SETTING VFR VENT: 30 %
PCO2 BLDA: 38.1 MMHG (ref 26–37)
PCO2 TEMP ADJ BLDA: 38.8 MMHG (ref 26–37)
PEEP END EXPIRATORY PRESSURE IPEEP: 8 CMH20
PH BLDA: 7.48 [PH] (ref 7.4–7.5)
PH TEMP ADJ BLDA: 7.48 [PH] (ref 7.4–7.5)
PLATELET # BLD AUTO: 300 K/UL (ref 164–446)
PLATELET # BLD AUTO: 438 K/UL (ref 164–446)
PLATELET BLD QL SMEAR: NORMAL
PMV BLD AUTO: 9.7 FL (ref 9–12.9)
PMV BLD AUTO: 9.8 FL (ref 9–12.9)
PO2 BLDA: 70 MMHG (ref 64–87)
PO2 TEMP ADJ BLDA: 72 MMHG (ref 64–87)
POLYCHROMASIA BLD QL SMEAR: NORMAL
POTASSIUM SERPL-SCNC: 3.5 MMOL/L (ref 3.6–5.5)
PROCALCITONIN SERPL-MCNC: 0.62 NG/ML
PRODUCT TYPE UPROD: NORMAL
RBC # BLD AUTO: 2.12 M/UL (ref 4.2–5.4)
RBC # BLD AUTO: 2.25 M/UL (ref 4.2–5.4)
RBC BLD AUTO: PRESENT
RH BLD: NORMAL
SAO2 % BLDA: 95 % (ref 93–99)
SODIUM SERPL-SCNC: 128 MMOL/L (ref 135–145)
SPECIMEN DRAWN FROM PATIENT: ABNORMAL
TARGETS BLD QL SMEAR: NORMAL
TIDAL VOLUME IVT: 320 ML
UNIT STATUS USTAT: NORMAL
WBC # BLD AUTO: 24.1 K/UL (ref 4.8–10.8)
WBC # BLD AUTO: 32.5 K/UL (ref 4.8–10.8)

## 2021-04-20 PROCEDURE — 87186 SC STD MICRODIL/AGAR DIL: CPT

## 2021-04-20 PROCEDURE — 0BC58ZZ EXTIRPATION OF MATTER FROM RIGHT MIDDLE LOBE BRONCHUS, VIA NATURAL OR ARTIFICIAL OPENING ENDOSCOPIC: ICD-10-PCS | Performed by: INTERNAL MEDICINE

## 2021-04-20 PROCEDURE — 700101 HCHG RX REV CODE 250: Performed by: INTERNAL MEDICINE

## 2021-04-20 PROCEDURE — 86901 BLOOD TYPING SEROLOGIC RH(D): CPT

## 2021-04-20 PROCEDURE — 700102 HCHG RX REV CODE 250 W/ 637 OVERRIDE(OP): Performed by: INTERNAL MEDICINE

## 2021-04-20 PROCEDURE — 84145 PROCALCITONIN (PCT): CPT

## 2021-04-20 PROCEDURE — 85007 BL SMEAR W/DIFF WBC COUNT: CPT

## 2021-04-20 PROCEDURE — 0BC48ZZ EXTIRPATION OF MATTER FROM RIGHT UPPER LOBE BRONCHUS, VIA NATURAL OR ARTIFICIAL OPENING ENDOSCOPIC: ICD-10-PCS | Performed by: INTERNAL MEDICINE

## 2021-04-20 PROCEDURE — 700111 HCHG RX REV CODE 636 W/ 250 OVERRIDE (IP): Performed by: INTERNAL MEDICINE

## 2021-04-20 PROCEDURE — 0BC38ZZ EXTIRPATION OF MATTER FROM RIGHT MAIN BRONCHUS, VIA NATURAL OR ARTIFICIAL OPENING ENDOSCOPIC: ICD-10-PCS | Performed by: INTERNAL MEDICINE

## 2021-04-20 PROCEDURE — 0BCB8ZZ EXTIRPATION OF MATTER FROM LEFT LOWER LOBE BRONCHUS, VIA NATURAL OR ARTIFICIAL OPENING ENDOSCOPIC: ICD-10-PCS | Performed by: INTERNAL MEDICINE

## 2021-04-20 PROCEDURE — 700105 HCHG RX REV CODE 258: Performed by: INTERNAL MEDICINE

## 2021-04-20 PROCEDURE — A9270 NON-COVERED ITEM OR SERVICE: HCPCS | Performed by: INTERNAL MEDICINE

## 2021-04-20 PROCEDURE — 86923 COMPATIBILITY TEST ELECTRIC: CPT | Mod: 91

## 2021-04-20 PROCEDURE — 85018 HEMOGLOBIN: CPT

## 2021-04-20 PROCEDURE — 87077 CULTURE AEROBIC IDENTIFY: CPT | Mod: 91

## 2021-04-20 PROCEDURE — 87205 SMEAR GRAM STAIN: CPT

## 2021-04-20 PROCEDURE — 0BC88ZZ EXTIRPATION OF MATTER FROM LEFT UPPER LOBE BRONCHUS, VIA NATURAL OR ARTIFICIAL OPENING ENDOSCOPIC: ICD-10-PCS | Performed by: INTERNAL MEDICINE

## 2021-04-20 PROCEDURE — 86900 BLOOD TYPING SEROLOGIC ABO: CPT

## 2021-04-20 PROCEDURE — 83735 ASSAY OF MAGNESIUM: CPT

## 2021-04-20 PROCEDURE — 86850 RBC ANTIBODY SCREEN: CPT

## 2021-04-20 PROCEDURE — P9016 RBC LEUKOCYTES REDUCED: HCPCS | Mod: 91

## 2021-04-20 PROCEDURE — 99291 CRITICAL CARE FIRST HOUR: CPT | Mod: 25 | Performed by: INTERNAL MEDICINE

## 2021-04-20 PROCEDURE — 82803 BLOOD GASES ANY COMBINATION: CPT

## 2021-04-20 PROCEDURE — 0BC78ZZ EXTIRPATION OF MATTER FROM LEFT MAIN BRONCHUS, VIA NATURAL OR ARTIFICIAL OPENING ENDOSCOPIC: ICD-10-PCS | Performed by: INTERNAL MEDICINE

## 2021-04-20 PROCEDURE — 0BC68ZZ EXTIRPATION OF MATTER FROM RIGHT LOWER LOBE BRONCHUS, VIA NATURAL OR ARTIFICIAL OPENING ENDOSCOPIC: ICD-10-PCS | Performed by: INTERNAL MEDICINE

## 2021-04-20 PROCEDURE — 0B9F8ZX DRAINAGE OF RIGHT LOWER LUNG LOBE, VIA NATURAL OR ARTIFICIAL OPENING ENDOSCOPIC, DIAGNOSTIC: ICD-10-PCS | Performed by: INTERNAL MEDICINE

## 2021-04-20 PROCEDURE — 71045 X-RAY EXAM CHEST 1 VIEW: CPT

## 2021-04-20 PROCEDURE — 37799 UNLISTED PX VASCULAR SURGERY: CPT

## 2021-04-20 PROCEDURE — 85027 COMPLETE CBC AUTOMATED: CPT | Mod: 91

## 2021-04-20 PROCEDURE — 31624 DX BRONCHOSCOPE/LAVAGE: CPT | Performed by: INTERNAL MEDICINE

## 2021-04-20 PROCEDURE — 36430 TRANSFUSION BLD/BLD COMPNT: CPT

## 2021-04-20 PROCEDURE — 87102 FUNGUS ISOLATION CULTURE: CPT

## 2021-04-20 PROCEDURE — 94003 VENT MGMT INPAT SUBQ DAY: CPT

## 2021-04-20 PROCEDURE — 94760 N-INVAS EAR/PLS OXIMETRY 1: CPT

## 2021-04-20 PROCEDURE — 87116 MYCOBACTERIA CULTURE: CPT

## 2021-04-20 PROCEDURE — 87015 SPECIMEN INFECT AGNT CONCNTJ: CPT

## 2021-04-20 PROCEDURE — 302978 HCHG BRONCHOSCOPY-DIAGNOSTIC

## 2021-04-20 PROCEDURE — 770022 HCHG ROOM/CARE - ICU (200)

## 2021-04-20 PROCEDURE — 94799 UNLISTED PULMONARY SVC/PX: CPT

## 2021-04-20 PROCEDURE — 80048 BASIC METABOLIC PNL TOTAL CA: CPT

## 2021-04-20 PROCEDURE — 87070 CULTURE OTHR SPECIMN AEROBIC: CPT

## 2021-04-20 PROCEDURE — 31645 BRNCHSC W/THER ASPIR 1ST: CPT | Performed by: INTERNAL MEDICINE

## 2021-04-20 PROCEDURE — 87206 SMEAR FLUORESCENT/ACID STAI: CPT

## 2021-04-20 RX ORDER — SODIUM CHLORIDE, SODIUM LACTATE, POTASSIUM CHLORIDE, AND CALCIUM CHLORIDE .6; .31; .03; .02 G/100ML; G/100ML; G/100ML; G/100ML
1000 INJECTION, SOLUTION INTRAVENOUS ONCE
Status: COMPLETED | OUTPATIENT
Start: 2021-04-20 | End: 2021-04-20

## 2021-04-20 RX ORDER — MIDAZOLAM HYDROCHLORIDE 1 MG/ML
2-4 INJECTION INTRAMUSCULAR; INTRAVENOUS ONCE
Status: DISPENSED | OUTPATIENT
Start: 2021-04-20 | End: 2021-04-21

## 2021-04-20 RX ORDER — METHYLPREDNISOLONE SODIUM SUCCINATE 40 MG/ML
40 INJECTION, POWDER, LYOPHILIZED, FOR SOLUTION INTRAMUSCULAR; INTRAVENOUS EVERY 6 HOURS
Status: COMPLETED | OUTPATIENT
Start: 2021-04-20 | End: 2021-04-23

## 2021-04-20 RX ADMIN — Medication 2500 MCG: at 20:24

## 2021-04-20 RX ADMIN — NAFCILLIN SODIUM 2 G: 2 INJECTION, POWDER, FOR SOLUTION INTRAMUSCULAR; INTRAVENOUS at 02:17

## 2021-04-20 RX ADMIN — POTASSIUM BICARBONATE 25 MEQ: 978 TABLET, EFFERVESCENT ORAL at 05:12

## 2021-04-20 RX ADMIN — Medication 2500 MCG: at 19:58

## 2021-04-20 RX ADMIN — Medication 400 MCG/HR: at 12:38

## 2021-04-20 RX ADMIN — NAFCILLIN SODIUM 2 G: 2 INJECTION, POWDER, FOR SOLUTION INTRAMUSCULAR; INTRAVENOUS at 23:13

## 2021-04-20 RX ADMIN — Medication 2500 MCG: at 06:03

## 2021-04-20 RX ADMIN — DULOXETINE HYDROCHLORIDE 60 MG: 60 CAPSULE, DELAYED RELEASE ORAL at 05:13

## 2021-04-20 RX ADMIN — NAFCILLIN SODIUM 2 G: 2 INJECTION, POWDER, FOR SOLUTION INTRAMUSCULAR; INTRAVENOUS at 06:03

## 2021-04-20 RX ADMIN — NOREPINEPHRINE BITARTRATE 14 MCG/MIN: 1 INJECTION, SOLUTION, CONCENTRATE INTRAVENOUS at 23:36

## 2021-04-20 RX ADMIN — RISPERIDONE 2 MG: 1 TABLET, FILM COATED ORAL at 05:12

## 2021-04-20 RX ADMIN — VASOPRESSIN 0.03 UNITS/MIN: 20 INJECTION INTRAVENOUS at 00:47

## 2021-04-20 RX ADMIN — METHYLPREDNISOLONE SODIUM SUCCINATE 40 MG: 40 INJECTION, POWDER, FOR SOLUTION INTRAMUSCULAR; INTRAVENOUS at 23:14

## 2021-04-20 RX ADMIN — PROPOFOL 25 MCG/KG/MIN: 10 INJECTION, EMULSION INTRAVENOUS at 05:02

## 2021-04-20 RX ADMIN — PAROXETINE HYDROCHLORIDE 10 MG: 20 TABLET, FILM COATED ORAL at 05:13

## 2021-04-20 RX ADMIN — DULOXETINE HYDROCHLORIDE 60 MG: 60 CAPSULE, DELAYED RELEASE ORAL at 18:07

## 2021-04-20 RX ADMIN — PROPOFOL 25 MCG/KG/MIN: 10 INJECTION, EMULSION INTRAVENOUS at 19:58

## 2021-04-20 RX ADMIN — HEPARIN SODIUM 5000 UNITS: 5000 INJECTION, SOLUTION INTRAVENOUS; SUBCUTANEOUS at 05:12

## 2021-04-20 RX ADMIN — DIVALPROEX SODIUM 500 MG: 125 CAPSULE ORAL at 22:19

## 2021-04-20 RX ADMIN — DIVALPROEX SODIUM 500 MG: 125 CAPSULE ORAL at 05:13

## 2021-04-20 RX ADMIN — FAMOTIDINE 20 MG: 20 TABLET ORAL at 05:13

## 2021-04-20 RX ADMIN — PROPOFOL 25 MCG/KG/MIN: 10 INJECTION, EMULSION INTRAVENOUS at 13:58

## 2021-04-20 RX ADMIN — DIVALPROEX SODIUM 500 MG: 125 CAPSULE ORAL at 13:11

## 2021-04-20 RX ADMIN — FAMOTIDINE 20 MG: 20 TABLET ORAL at 18:07

## 2021-04-20 RX ADMIN — DOCUSATE SODIUM 50 MG AND SENNOSIDES 8.6 MG 2 TABLET: 8.6; 5 TABLET, FILM COATED ORAL at 18:07

## 2021-04-20 RX ADMIN — DORNASE ALFA 5 MG: 1 SOLUTION RESPIRATORY (INHALATION) at 08:25

## 2021-04-20 RX ADMIN — ALTEPLASE 5 MG: KIT at 08:25

## 2021-04-20 RX ADMIN — RISPERIDONE 2 MG: 1 TABLET, FILM COATED ORAL at 18:07

## 2021-04-20 RX ADMIN — NAFCILLIN SODIUM 2 G: 2 INJECTION, POWDER, FOR SOLUTION INTRAMUSCULAR; INTRAVENOUS at 18:07

## 2021-04-20 RX ADMIN — SODIUM CHLORIDE, POTASSIUM CHLORIDE, SODIUM LACTATE AND CALCIUM CHLORIDE 1000 ML: 600; 310; 30; 20 INJECTION, SOLUTION INTRAVENOUS at 13:10

## 2021-04-20 RX ADMIN — FUROSEMIDE 40 MG: 10 INJECTION, SOLUTION INTRAMUSCULAR; INTRAVENOUS at 05:12

## 2021-04-20 RX ADMIN — HEPARIN SODIUM 5000 UNITS: 5000 INJECTION, SOLUTION INTRAVENOUS; SUBCUTANEOUS at 13:11

## 2021-04-20 RX ADMIN — METHYLPREDNISOLONE SODIUM SUCCINATE 40 MG: 40 INJECTION, POWDER, FOR SOLUTION INTRAMUSCULAR; INTRAVENOUS at 18:07

## 2021-04-20 RX ADMIN — NAFCILLIN SODIUM 2 G: 2 INJECTION, POWDER, FOR SOLUTION INTRAMUSCULAR; INTRAVENOUS at 14:04

## 2021-04-20 RX ADMIN — NOREPINEPHRINE BITARTRATE 12 MCG/MIN: 1 INJECTION, SOLUTION, CONCENTRATE INTRAVENOUS at 12:37

## 2021-04-20 RX ADMIN — NAFCILLIN SODIUM 2 G: 2 INJECTION, POWDER, FOR SOLUTION INTRAMUSCULAR; INTRAVENOUS at 09:49

## 2021-04-20 ASSESSMENT — PAIN DESCRIPTION - PAIN TYPE
TYPE: ACUTE PAIN;CHRONIC PAIN
TYPE: ACUTE PAIN
TYPE: ACUTE PAIN;CHRONIC PAIN
TYPE: ACUTE PAIN;CHRONIC PAIN

## 2021-04-20 ASSESSMENT — FIBROSIS 4 INDEX: FIB4 SCORE: .64

## 2021-04-20 NOTE — RESPIRATORY CARE
Ventilator Daily Summary     Vent Day:5  ETT 7.5@23    APVCMV  20, 320, +8, 30%      Plan: Continue current ventilator settings and wean mechanical ventilation as tolerated per physician orders.

## 2021-04-20 NOTE — PROGRESS NOTES
"    DATE: 4/20/2021    [facility] Day 4  Right empyema / septic shock /MSSA bacteremia /infective endocarditis.    Interval Events:  Remains acutely ill on full ventilator support  On Levophed infusion for blood pressure support due to septic shock  Transfuse 1 unit packed cells for low hemoglobin  Bloody secretions noted from endotracheal tube with suctioning  Continues to get intrapleural pleural fibrinolytics    PHYSICAL EXAMINATION:  Constitutional:     Vital Signs: BP (!) 86/49   Pulse 94   Temp 37.4 °C (99.3 °F)   Resp (!) 21   Ht 1.6 m (5' 2.99\")   Wt 82.5 kg (181 lb 14.1 oz)   SpO2 97%    General Appearance: The patient is a critically ill-appearing and Intubated and sedated woman in mild distress.  HEENT:    The pupils are equal, round, and reactive to light bilaterally The nares and oropharynx are clear. The trachea is midline. No jugulovenous distension.  Respiratory:   Inspection: Unlabored respirations, no intercostal retractions, paradoxical motion, or accessory muscle use.   Right chest tube #1 -1040 cc bloody fluid /no air leak present   Palpation:  The chest is nontender.    Auscultation: normal.  Cardiovascular:   Inspection: The skin is warm, dry and well purfused.  Auscultation: Regular rate and rhythm.   Peripheral Pulses: Normal.   Abdomen:   Inspection: Abdominal inspection reveals no abrasions, contusions, lacerations or penetrating wounds.   Palpation: Palpation is remarkable for no significant tenderness, guarding, or peritoneal findings.      Laboratory Values:   Recent Labs     04/18/21  0205 04/18/21  0845 04/19/21  0400 04/19/21  1530 04/20/21  0300   WBC 22.8*  --  21.8*  --  24.1*   RBC 2.68*  --  2.79*  --  2.25*   HEMOGLOBIN 7.5*   < > 7.8* 7.5* 6.2*   HEMATOCRIT 21.5*  --  22.7*  --  18.2*   MCV 80.2*  --  81.4  --  80.9*   MCH 28.0  --  28.0  --  27.6   MCHC 34.9  --  34.4  --  34.1   RDW 54.4*  --  53.6*  --  55.6*   PLATELETCT 125*  --  207  --  300   MPV 9.8  --  9.9 "  --  9.8    < > = values in this interval not displayed.     Recent Labs     04/18/21  0205 04/19/21  0400 04/20/21  0300   SODIUM 129* 130* 128*   POTASSIUM 3.4* 3.1* 3.5*   CHLORIDE 96 96 93*   CO2 26 26 27   GLUCOSE 85 136* 107*   BUN 13 13 14   CREATININE 0.54 0.56 0.59   CALCIUM 7.0* 7.0* 6.8*     Recent Labs     04/19/21  0400   ASTSGOT 16   ALTSGPT 16   TBILIRUBIN 1.9*   ALKPHOSPHAT 599*   GLOBULIN 3.8*            Imaging:   DX-CHEST-LIMITED (1 VIEW)   Final Result      Decreased partially loculated right pleural fluid with increased pleural air. Right chest tube is in place.      Increased hazy opacity in the left lung possibly atelectasis.         US-ABDOMEN LTD (SOFT TISSUE)   Final Result      No drainable fluid collection.      DX-CHEST-PORTABLE (1 VIEW)   Final Result      Decreased partially loculated right pleural fluid with increased pleural air. Right chest tube is in place.      No other significant change.      DX-CHEST-PORTABLE (1 VIEW)   Final Result         1.  Pulmonary edema and/or infiltrates are identified, which are somewhat decreased since the prior exam.   2.  Moderate loculated appearing right pleural effusion, slightly decreased since prior      DX-ABDOMEN FOR TUBE PLACEMENT   Final Result      Feeding tube tip at the distal stomach.      DX-CHEST-PORTABLE (1 VIEW)   Final Result         1.  New chest tube is noted in the right lower hemithorax.      2.  Right pleural effusion is again identified which appears similar to the prior exam.      3.  No new infiltrates or consolidations are identified.      DX-CHEST-PORTABLE (1 VIEW)   Final Result         1.  Pulmonary edema and/or infiltrates are identified, which are stable since the prior exam.   2.  Moderate loculated appearing right pleural effusion      DX-CHEST-PORTABLE (1 VIEW)   Final Result         No significant interval change.          ASSESSMENT AND PLAN:     Pleural effusion, right -concern of empyema/hemothorax  Assessment  & Plan  Upsized pigtail to 36 Haitian chest tube, serosanguineous fluid removed no active bleeding noted.  We will continue with drainage with appropriate sized chest tube and reassess in a.m.  Likely able to proceed with TPA dornase at that point.      DISPOSITION: Continue nonoperative management and close clinical observation. Improving clinical condition. Continue intrapleural fibrinolytics     Significant improvement in the right chest x-ray with the change in chest tube as well as intrapleural fibrinolytic.  Patient will need a repeat CT of the chest evaluate the effectiveness of this treatment.  Patient remains extremely poor surgical candidate and this time would not be a candidate for one lung ventilation.       ____________________________________     Dillan Contreras M.D.    DD: 4/20/2021  8:07 AM

## 2021-04-20 NOTE — CARE PLAN
Problem: Communication  Goal: The ability to communicate needs accurately and effectively will improve  Outcome: PROGRESSING AS EXPECTED     Problem: Safety  Goal: Will remain free from injury  Outcome: PROGRESSING AS EXPECTED     Problem: Infection  Goal: Will remain free from infection  Outcome: PROGRESSING AS EXPECTED     Problem: Venous Thromboembolism (VTW)/Deep Vein Thrombosis (DVT) Prevention:  Goal: Patient will participate in Venous Thrombosis (VTE)/Deep Vein Thrombosis (DVT)Prevention Measures  Outcome: PROGRESSING AS EXPECTED     Problem: Bowel/Gastric:  Goal: Normal bowel function is maintained or improved  Outcome: PROGRESSING AS EXPECTED     Problem: Safety - Medical Restraint  Goal: Remains free of injury from restraints (Restraint for Interference with Medical Device)  Description: INTERVENTIONS:  1. Determine that other, less restrictive measures have been tried or would not be effective before applying the restraint  2. Evaluate the patient's condition at the time of restraint application  3. Inform patient/family regarding the reason for restraint  4. Q2H: Monitor safety, psychosocial status, comfort, nutrition and hydration  Outcome: PROGRESSING AS EXPECTED  Goal: Free from restraint(s) (Restraint for Interference with Medical Device)  Description: INTERVENTIONS:  1. ONCE/SHIFT or MINIMUM Q12H: Assess and document the continuing need for restraints  2. Q24H: Continued use of restraint requires LIP to perform face to face examination and written order  3. Identify and implement measures to help patient regain control  Outcome: PROGRESSING AS EXPECTED     Problem: Skin Integrity  Goal: Risk for impaired skin integrity will decrease  Outcome: PROGRESSING AS EXPECTED

## 2021-04-20 NOTE — PROGRESS NOTES
Pulmonary and Critical Care Medicine Progress Note    Notified of Hgb of 6.2.  I will transfuse one unit of PRBCs.  Trend Hgb.    Drew Kong MD  Pulmonary and Critical Care Medicine

## 2021-04-20 NOTE — CARE PLAN
Problem: Safety  Goal: Will remain free from injury  4/19/2021 1829 by Camelia Flower, R.N.  Outcome: PROGRESSING AS EXPECTED  4/19/2021 1827 by Camelia Flower, R.N.  Outcome: PROGRESSING AS EXPECTED  4/19/2021 1825 by Camelia Flower R.N.  Outcome: PROGRESSING AS EXPECTED  Goal: Will remain free from falls  4/19/2021 1829 by Camelia Flower, R.N.  Outcome: PROGRESSING AS EXPECTED  4/19/2021 1827 by Camelia Flower, R.N.  Outcome: PROGRESSING AS EXPECTED  4/19/2021 1825 by Camelia Flower, R.N.  Outcome: PROGRESSING AS EXPECTED     Problem: Infection  Goal: Will remain free from infection  Outcome: PROGRESSING AS EXPECTED     Problem: Safety - Medical Restraint  Goal: Remains free of injury from restraints (Restraint for Interference with Medical Device)  Description: INTERVENTIONS:  1. Determine that other, less restrictive measures have been tried or would not be effective before applying the restraint  2. Evaluate the patient's condition at the time of restraint application  3. Inform patient/family regarding the reason for restraint  4. Q2H: Monitor safety, psychosocial status, comfort, nutrition and hydration  Outcome: PROGRESSING AS EXPECTED     Problem: Pain Management  Goal: Pain level will decrease to patient's comfort goal  Outcome: PROGRESSING AS EXPECTED     Problem: Communication  Goal: The ability to communicate needs accurately and effectively will improve  4/19/2021 1829 by Camelia Flower, R.N.  Outcome: PROGRESSING SLOWER THAN EXPECTED  4/19/2021 1825 by Camelia Flower, R.N.  Outcome: PROGRESSING SLOWER THAN EXPECTED     Problem: Safety - Medical Restraint  Goal: Free from restraint(s) (Restraint for Interference with Medical Device)  Description: INTERVENTIONS:  1. ONCE/SHIFT or MINIMUM Q12H: Assess and document the continuing need for restraints  2. Q24H: Continued use of restraint requires LIP to perform face to face examination and written order  3.  Identify and implement measures to help patient regain control  Outcome: PROGRESSING SLOWER THAN EXPECTED

## 2021-04-20 NOTE — PROGRESS NOTES
"Critical Care Progress Note    Date of admission  4/16/2021    Chief Complaint  \"50 y.o. female the past medical history of pseudotumor cerebri status post  shunt by Dr. Oh, chronic pain with history of placement of nerve stimulator, vasculitis, right anterior chest port for home IV meds with recurrent infection who presented 4/11/2021 with to [facility] with recurrent port infection and was initially treated with vancomycin and Zosyn and then changed to ceftaroline and subsequently switched to nafcillin when MSSA was identified.  [facility] infectious disease is managing antibiotics.  Dr. Candelaria removed the port on 4/12.  Imaging suggest tricuspid valve endocarditis.  Lumbar puncture performed 4/14 showed pleocytosis with negative Gram stain and culture so far.  She had worsening leukocytosis with WBC up to 35K and chest CT yesterday revealed septic emboli with greater involvement in the right lung as well as a large right pleural effusion.  IR performed a thoracentesis and placed a small pigtail catheter in the right chest which is now at 20 cm of suction.  There were significant loculations and fluid analysis suggested empyema.  Patient subsequently developed increased work of breathing and was intubated and placed on the ventilator yesterday.  Hemoglobin dropped from 8.3-6.9 as they were considering TPA and dornase via this pigtail catheter and instead they transfused 1 unit of blood.  The plan was to consult thoracic surgery but their only surgeon is out of town till next week.  Dr. Oh consulted on 4/14 and recommended brain MRI and it was ordered but is still not performed (nerve stimulator not MRI compatible?).  Dr. Oh did not recommend removal of shunt at this time apparently.  The patient's had some hypotension while on the ventilator requiring titration of norepinephrine and vasopressin.  [place] [facility] [place] and Dr. Albert Benedict excepted the patient along with  " "[place] on transfer.\"    Hospital Course  4/16 admitted to ICU  4/17 VD 2, 2 FFP, pRBC overnight; new chest tube per gen surg  4/18 VD 3, TPa/Dornase with 1400 cc from R chest tube    Interval Problem Update  Reviewed last 24 hour events:  Hb 6.2, given 1 u prbc  Bloody output chest tube from loculated effusion  Continue tpa/dornase  Trend hb  Transfuse for less than 7  On ventilator  Sbt/sat  Diuresing  Surgery following    Addendum  Bronchoscopy with blood clots/mucus plugs  Diffuse bleeding  Start on steroids    Review of Systems  Review of Systems   Unable to perform ROS: Intubated        Vital Signs for last 24 hours   Temp:  [37.4 °C (99.3 °F)] 37.4 °C (99.3 °F)  Pulse:  [] 94  Resp:  [16-29] 21  BP: ()/(39-89) 86/49  SpO2:  [87 %-100 %] 97 %    Hemodynamic parameters for last 24 hours  CVP:  [288 MM HG-300 MM HG] 288 MM HG    Respiratory Information for the last 24 hours  Vent Mode: APVCMV  Rate (breaths/min): 20  Vt Target (mL): 320  PEEP/CPAP: 8  MAP: 12  Control VTE (exp VT): 319    Physical Exam   Physical Exam  Vitals and nursing note reviewed.   Constitutional:       Appearance: She is ill-appearing.   HENT:      Head: Normocephalic and atraumatic.      Right Ear: External ear normal.      Left Ear: External ear normal.      Nose: Nose normal.      Mouth/Throat:      Mouth: Mucous membranes are moist.   Eyes:      Pupils: Pupils are equal, round, and reactive to light.   Cardiovascular:      Rate and Rhythm: Normal rate and regular rhythm.      Pulses: Normal pulses.      Heart sounds: Murmur present. No gallop.       Comments: R sided chest tube  Pulmonary:      Comments: Equal and symmetric breath sounds to mechanical ventilation  Abdominal:      General: Abdomen is flat. There is no distension.      Palpations: Abdomen is soft.      Tenderness: There is no abdominal tenderness. There is no guarding or rebound.   Musculoskeletal:      Cervical back: No rigidity.      Right lower leg: No " edema.      Left lower leg: No edema.   Lymphadenopathy:      Cervical: No cervical adenopathy.   Skin:     General: Skin is warm and dry.      Capillary Refill: Capillary refill takes less than 2 seconds.   Neurological:      Comments: Grimaces, moves all 4 extremities         Medications  Current Facility-Administered Medications   Medication Dose Route Frequency Provider Last Rate Last Admin   • fentaNYL (SUBLIMAZE) 50 mcg/mL in 50mL   Intravenous Continuous Viraj Burgos M.D. 8 mL/hr at 04/19/21 1621 2,500 mcg at 04/20/21 0603   • furosemide (LASIX) injection 40 mg  40 mg Intravenous Q DAY Viraj Burgos M.D.   40 mg at 04/20/21 0512   • potassium bicarbonate (KLYTE) effervescent tablet 25 mEq  25 mEq Enteral Tube DAILY Viraj Burgos M.D.   25 mEq at 04/20/21 0512   • heparin injection 5,000 Units  5,000 Units Subcutaneous Q8HRS Elliott Salvador M.D.   5,000 Units at 04/20/21 0512   • alteplase (Activase) 5 mg in NS 30 mL INTRAPLEURAL syringe  5 mg Intrapleural TWICE DAILY Elliott Salvador M.D.   5 mg at 04/19/21 1623    And   • dornase alpha (PULMOZYME) 5 mg in NS 30 mL INTRAPLEURAL syringe  5 mg Intrapleural TWICE DAILY Elliott Salvador M.D.   5 mg at 04/19/21 1623   • Pharmacy Consult: Enteral tube insertion - review meds/change route/product selection  1 Each Other PHARMACY TO DOSE Elliott Salvador M.D.       • acetaminophen (Tylenol) tablet 650 mg  650 mg Enteral Tube Q4HRS PRN Elliott Salvador M.D.       • magnesium hydroxide (MILK OF MAGNESIA) suspension 30 mL  30 mL Enteral Tube QDAY PRN Elliott Salvador M.D.        And   • senna-docusate (PERICOLACE or SENOKOT S) 8.6-50 MG per tablet 2 tablet  2 tablet Enteral Tube BID Elliott Salvador M.D.   Stopped at 04/20/21 0600    And   • polyethylene glycol/lytes (MIRALAX) PACKET 1 Packet  1 Packet Enteral Tube QDAY PRN Elliott Salvador M.D.        And   • bisacodyl (DULCOLAX) suppository 10 mg  10 mg Rectal QDAY PRN Elliott Salvador M.D.       •  divalproex (DEPAKOTE SPRINKLE) capsule 500 mg  500 mg Enteral Tube Q8HRS Elliott Salvador M.D.   500 mg at 04/20/21 0513   • DULoxetine (CYMBALTA) capsule 60 mg  60 mg Enteral Tube BID Elliott Salvador M.D.   60 mg at 04/20/21 0513   • PARoxetine (PAXIL) tablet 10 mg  10 mg Enteral Tube QAM Elliott Salvador M.D.   10 mg at 04/20/21 0513   • risperiDONE (RISPERDAL) tablet 2 mg  2 mg Enteral Tube BID Elliott Salvador M.D.   2 mg at 04/20/21 0512   • Respiratory Therapy Consult   Nebulization Continuous RT Man Wahl M.D.       • famotidine (PEPCID) tablet 20 mg  20 mg Enteral Tube Q12HRS Man Wahl M.D.   20 mg at 04/20/21 0513    Or   • famotidine (PEPCID) injection 20 mg  20 mg Intravenous Q12HRS Man Wahl M.D.   20 mg at 04/17/21 1747   • MD Alert...ICU Electrolyte Replacement per Pharmacy   Other PHARMACY TO DOSE Man Wahl M.D.       • lidocaine (XYLOCAINE) 1 % injection 1-2 mL  1-2 mL Tracheal Tube Q30 MIN PRN Man Wahl M.D.       • ipratropium-albuterol (DUONEB) nebulizer solution  3 mL Nebulization Q2HRS PRN (RT) Man Wahl M.D.       • fentaNYL (SUBLIMAZE) injection 25 mcg  25 mcg Intravenous Q HOUR PRN Man Wahl M.D.        Or   • fentaNYL (SUBLIMAZE) injection 50 mcg  50 mcg Intravenous Q HOUR PRN Man Wahl M.D.        Or   • fentaNYL (SUBLIMAZE) injection 100 mcg  100 mcg Intravenous Q HOUR PRN Man Wahl M.D.   100 mcg at 04/17/21 0523   • propofol (DIPRIVAN) injection  0-80 mcg/kg/min Intravenous Continuous Man Wahl M.D. 11.3 mL/hr at 04/20/21 0630 25 mcg/kg/min at 04/20/21 0630   • norepinephrine (Levophed) infusion 8 mg/250 mL (premix)  0-30 mcg/min Intravenous Continuous Man Wahl M.D. 3.8 mL/hr at 04/20/21 0629 2 mcg/min at 04/20/21 0629   • vasopressin (VASOSTRICT) 20 Units in  mL Infusion  0.03 Units/min Intravenous Continuous Man Wahl M.D. 9 mL/hr at 04/20/21 0047 0.03 Units/min at  04/20/21 0047   • nafcillin 2 g in dextrose 5% 100 mL IVPB  2 g Intravenous Q4HR Man Wahl M.D.   Stopped at 04/20/21 0633       Fluids    Intake/Output Summary (Last 24 hours) at 4/20/2021 0714  Last data filed at 4/20/2021 0630  Gross per 24 hour   Intake 2440.37 ml   Output 3070 ml   Net -629.63 ml       Laboratory  Recent Labs     04/18/21  0445 04/19/21  0429 04/20/21  0258   ISTATAPH 7.472 7.453 7.483   ISTATAPCO2 35.7 36.8 38.1*   ISTATAPO2 92* 93* 70   ISTATATCO2 27 27 30   TLQAWOX0SLS 98 98 95   ISTATARTHCO3 26.1* 25.7* 28.6*   ISTATARTBE 2 2 5*   ISTATTEMP 37.6 C 37.3 C 37.4 C   ISTATFIO2 40 30 30   ISTATSPEC Arterial Arterial Arterial   ISTATAPHTC 7.463 7.449 7.477   VPUGFGLW8XC 96* 94* 72         Recent Labs     04/18/21  0205 04/19/21  0400 04/20/21  0300   SODIUM 129* 130* 128*   POTASSIUM 3.4* 3.1* 3.5*   CHLORIDE 96 96 93*   CO2 26 26 27   BUN 13 13 14   CREATININE 0.54 0.56 0.59   MAGNESIUM 1.8 1.8 2.0   PHOSPHORUS 4.4 4.7*  --    CALCIUM 7.0* 7.0* 6.8*     Recent Labs     04/18/21  0205 04/19/21  0400 04/20/21  0300   ALTSGPT  --  16  --    ASTSGOT  --  16  --    ALKPHOSPHAT  --  599*  --    TBILIRUBIN  --  1.9*  --    PREALBUMIN  --  6.5*  --    GLUCOSE 85 136* 107*     Recent Labs     04/18/21  0205 04/19/21  0400 04/20/21  0300   WBC 22.8* 21.8* 24.1*   NEUTSPOLYS 85.10* 86.10* 86.10*   LYMPHOCYTES 3.50* 2.60* 6.10*   MONOCYTES 4.40 6.10 1.70   EOSINOPHILS 0.00 0.00 0.00   BASOPHILS 0.00 0.00 0.00   ASTSGOT  --  16  --    ALTSGPT  --  16  --    ALKPHOSPHAT  --  599*  --    TBILIRUBIN  --  1.9*  --      Recent Labs     04/18/21  0205 04/18/21  0845 04/19/21  0400 04/19/21  1530 04/20/21  0300   RBC 2.68*  --  2.79*  --  2.25*   HEMOGLOBIN 7.5*   < > 7.8* 7.5* 6.2*   HEMATOCRIT 21.5*  --  22.7*  --  18.2*   PLATELETCT 125*  --  207  --  300    < > = values in this interval not displayed.       Imaging  X-Ray:  I have personally reviewed the images and compared with prior  images.    Assessment/Plan  * Septic shock (HCC)  Assessment & Plan  This is Septic shock Present on admission  SIRS criteria identified on my evaluation include: Tachypnea, with respirations greater than 20 per minute and Leukocytosis, with WBC greater than 12,000  Source is right anterior chest port, MSSA  Suspected onset of infection unknown  Patient developed hypotension at transferring facility [facility], resuscitation complete there, currently titrating norepinephrine/vasopressin MAP > 65  IV antibiotics as appropriate for source of sepsis  Reassessment: I have reassessed the patient's hemodynamic status    MSSA endocarditis + R lung empyema and bacteremia   Nafcillin   Follow repeat cultures  NE + vaso for MAP > 65      Pleural effusion, right -concern of empyema/hemothorax  Assessment & Plan  Right-sided effusion associated with infected catheter right anterior chest and septic emboli, possible empyema  Status post chest tube placement 4/16  At [facility] complicated by some bleeding  Alteplase dornase considered via chest tube but held secondary to drop in hemoglobin requiring transfusion  Pleural studies reveal marked elevated white count as well as RBC count and primarily segs/bands    Plan:  Daily CXR  Nafcillin  4/17 Dr. Albert Benedict placed new R sided chest tube  4/18 TPa/Dornase BID x 3 days initiated     Right-sided acute bacterial endocarditis  Assessment & Plan  Cultures growing MSSA at [facility]  Infected right anterior chest port/line removed  CTA chest 4/15 with multiple septic emboli worse on the right complicated by large right effusion with loculations  Echocardiogram 4/12 reveals echogenic mobile lesion on tricuspid valve RVSP 30, EF 55%  ID managing antibiotics  Monitor for the need for cardiology consultation and MICAH    Acute respiratory failure with hypoxia (HCC)  Assessment & Plan  Intubated 4/15 for respiratory failure at [facility]    Plan:  Lung protective ventilation strategies  Optimize oxygenation,  ventilation, and acid base balance.   ABCEDF bundle  SAT/SBT   Sedation Lite    Acute encephalopathy- (present on admission)  Assessment & Plan  Multifactorial including septic and metabolic  Status post lumbar puncture with cytosis but negative Gram stain and cultures NGTD   shunt for pseudotumor cerebri    Plan:  MRI pending    Pneumonia  Assessment & Plan  MSSA septic emboli with empyema     Bacteremia due to methicillin susceptible Staphylococcus aureus (MSSA)  Assessment & Plan  Source presumed right anterior chest line/port  Recurrent line/port infection  Blood cultures positive for MSSA    Plan:  CT imaging suggestive of empyema and septic emboli    Repeat blood cultures every 48 hours until negative  Nafcillin 2 g IV every 4  ID following  Formal ECHO pending    CSF pleocytosis  Assessment & Plan  Status post lumbar puncture 4/13  WBC 53  Glucose 47, protein 97 Gram stain negative and culture negative so far  Neurosurgery aware,  shunt in place  MRI brain requested by neurosurgery at [facility]      Acute blood loss anemia  Assessment & Plan  Hemoglobin dropped from 8.3->6.4 on 4/16, repeat hemoglobin pending  Status post transfusion 1 unit of blood 4/16 at [facility]    Plan:  Q12 hour H/H  Conservative transfusion goal to hemoglobin > 7  Reverse coagulopathy   prbc given again 4/20    Thrombocytopenia (HCC)- (present on admission)  Assessment & Plan  Sepsis induced  Improving  No antiplatelets/AC until > 50    S/P  shunt- (present on admission)  Assessment & Plan  History of pseudotumor cerebri  History of  shunt by Dr. Oh  MRI brain pending, ordered 4/14 by Dr Oh; awaiting MRI compatibility clarification  4/14 lumbar puncture did reveal pleocytosis but Gram stain negative cultures NGTD  Follow CSF cultures, ID following    History of vasculitis  Assessment & Plan  Monitor    Prolonged Q-T interval on ECG  Assessment & Plan  Avoid QT prolonging agents as able  Optimize electrolytes  Cardiac  monitoring  EKG    History of complicated migraines- (present on admission)  Assessment & Plan  Resume home regimen when clinically appropriate    H/O: CVA (cerebrovascular accident)- (present on admission)  Assessment & Plan  Monitor      Chronic pain syndrome- (present on admission)  Assessment & Plan  Chronic back pain  History of nerve stimulator     Plan:  Resume home regimen as clinically appropriate       VTE:  Heparin SQ  Ulcer: H2 Antagonist  Lines: Central Line  Ongoing indication addressed, Arterial Line  Ongoing indication addressed and Lopez Catheter  Ongoing indication addressed    I have performed a physical exam and reviewed and updated ROS and Plan today (4/20/2021). In review of yesterday's note (4/19/2021), there are no changes except as documented above.     Discussed patient condition and risk of morbidity and/or mortality with Hospitalist, Family, RN, RT, Pharmacy and Charge nurse / hot rounds     The patient remains critically ill.  On mechanical ventilator.  Chest tube in place with tpa/dornase therapy and bloody output.  Chest tube to suction.  1 prbc for hb 6.2.  Propofol and fentanyl drips for sedation.  Levophed for map > 65 and sbp > 90.  Critical care time = 45 minutes in directly providing and coordinating critical care and extensive data review.  No time overlap and excludes procedures.

## 2021-04-20 NOTE — DISCHARGE PLANNING
Care Transition Team Discharge Planning    Anticipated Discharge Disposition: TBD    Action: Per AM rounds, pt is on vent day 3, has chest tube w/ output, was transferred from [facility], has medical hx of a shunt placement and port for chronic pain management.    Barriers to Discharge: medical clearance/stability    Plan: Lsw will continue to follow, attend rounds for medical updates, and assist w/ d/c planning.

## 2021-04-20 NOTE — PROGRESS NOTES
Infectious Disease Daily Progress Note    Date of Service  4/20/2021    Chief Complaint  Nafcillin 2g Q4 hrs 4/15-present    Previous: Unasyn, Zosyn, Vanco, Daptomycin  Ceftaroline: start 4/13-4/15    4/14: Unable to give name of previous NSG or neurologist. Seems confused, but able to say some sentences fluently.   4/15: Line cultures now growing MSSA. As well as from the blood. Repeat blood culture 4/13+ in both sets. Patient has worsening confusion. CSF fluid analyses suggest pleocytosis. Cultures are no growth from the CSF. Chest CT was ordered this morning indicating a loculated right pleural effusion as well as bilateral septic emboli. Dr. Mack spoke with Dr. Oh yesterday and no surgical intervention unless clinical deterioration.  4/16: Increased respiratory distress.  Tachypneic.  CT with loculated collection.  Dr Resendiz not available.  No thoracic surgeon available.  Plans to have pulmonary place CT.  Transferring to ICU.  4/17: Transferred to [facility] last night. Hgb dropped to 6.4 requiring PRBC transfusion. Requiring 2 pressors. Fio2 50%. Febrile 38.8. Pending OR decortication of empyema and hemothorax. Chest tube placed at [facility] shows 8,371 WBC, ,000, 74% N  4/18: Chest tube was upsized by surgery yesterday, no decortication. WBC 22k. Fio2 40%. Vasopressin. Levophed down to 2mcg. Afebrile 24 hrs. Last fever 38.6 on 4/16.   4/19: Still on vent. Levo 3 mcg, vasopressin. Afebrile 72 hours. WBC 21k. BC 4/17 NGTD x 48 hours. Unable to do MRI brain given neurostimulator per RN.   4/20: Remaining afebrile. Hb 6.2 and undergoing transfusion today.WBC 24,100, 5% bands.1700 ml CT output. Copious bloody ET secretions. No pressors. Receiving dornase and TPA per CT. Right pleural effusion not large per CXR today.     Review of Systems  Review of Systems   Unable to perform ROS: Acuity of condition      Physical Exam  Temp:  [37.4 °C (99.3 °F)] 37.4 °C (99.3 °F)  Pulse:  [] 101  Resp:  [18-34]  21  BP: ()/(39-89) 83/43  SpO2:  [87 %-100 %] 95 %    Physical Exam  Constitutional:       General: She is not in acute distress.     Comments: Sedated on vent.   HENT:      Head: Normocephalic.      Mouth/Throat:      Mouth: Mucous membranes are moist.      Comments: Orotracheal tube.  Eyes:      Conjunctiva/sclera: Conjunctivae normal.      Comments: No chemosis.   Cardiovascular:      Rate and Rhythm: Normal rate and regular rhythm.      Heart sounds: No murmur. No gallop.    Pulmonary:      Comments: O2 sat 97% on FIO3 30%, PEEP 8. RR 20. Coarse rhonchi bilat. Right chest tube.   Abdominal:      Palpations: Abdomen is soft.      Tenderness: There is no abdominal tenderness.   Musculoskeletal:         General: No swelling.   Skin:     General: Skin is warm and dry.      Findings: No rash.   Neurological:      Comments: Obtunded. Unresponsive.        Constitutional:       Comments: Intubated sedated . Exam unchanged  HENT:      Head: Normocephalic.      Comments: ET tube  Cardiovascular:      Rate and Rhythm: Normal rate and regular rhythm.      Heart sounds: No murmur.   Pulmonary:      Comments: Diminished R side  Abdominal:      General: Abdomen is flat. Bowel sounds are normal. There is no distension.   Musculoskeletal:         General: No swelling or deformity.   Skin:     Comments: R chest tube. Stimulator palpable, non induratated      Laboratory  Recent Labs     04/18/21  0205 04/18/21  0845 04/19/21  0400 04/19/21  0400 04/19/21  1530 04/20/21  0300 04/20/21  0840   WBC 22.8*  --  21.8*  --   --  24.1*  --    RBC 2.68*  --  2.79*  --   --  2.25*  --    HEMOGLOBIN 7.5*   < > 7.8*   < > 7.5* 6.2* 7.4*   HEMATOCRIT 21.5*  --  22.7*  --   --  18.2*  --    MCV 80.2*  --  81.4  --   --  80.9*  --    MCH 28.0  --  28.0  --   --  27.6  --    MCHC 34.9  --  34.4  --   --  34.1  --    RDW 54.4*  --  53.6*  --   --  55.6*  --    PLATELETCT 125*  --  207  --   --  300  --    MPV 9.8  --  9.9  --   --  9.8  --      < > = values in this interval not displayed.     Recent Labs     04/18/21  0205 04/19/21  0400 04/20/21  0300   SODIUM 129* 130* 128*   POTASSIUM 3.4* 3.1* 3.5*   CHLORIDE 96 96 93*   CO2 26 26 27   GLUCOSE 85 136* 107*   BUN 13 13 14   CREATININE 0.54 0.56 0.59   CALCIUM 7.0* 7.0* 6.8*           Impression   -Severe sepsis, now septic shock  -Catheter related bloodstream infection due to infected port status post removal 4/12-staph aureus  -Acute tricuspid native valve infective endocarditis due to staph aureus  -Acute right loculated pleural effusion/empyema s/p chest tube placement 4/16.   -Acute septic emboli bilaterally  -Acute bilateral pneumonia due to above  -Pseudotumor cerebri with underlying  shunt  -Chronic migraine headaches  -History of autoimmune vasculitis  -Elevated alkaline phosphatase & bilirubin  -Acute encephalopathy due to sepsis staphylococcal, meningitis ruled out      - hemothorax R      - anemia due to above     Plan   Patient has evidence of disseminated staphylococcal sepsis source from her line with infection to her lungs, tricuspid valve and now has R empyema requiring transfer to [facility] for decortication due to lack of CT surgery support     - Continue Nafcillin for CNS penetration for now. Initial CSF does not show ventriculitis, but cannot rule out septic brain emboli and brain abscess. Unfortunately has a stimulator that is non compatible with MRI.   - US soft tissue to rule out stimulator abscess  - initial CSF cultures has pleocytosis, but no culture growth  - chest tube upsized by surgery 4/17.   - thoracentesis and pleural fluid consistent with empyema and hemothorax  - blood culture from 4/14 at [facility] positive.Admission blood cultures here on 4/16 both positive.  4/17 blood cultures NGTD  - has retained R central line. Will need to be exchanged once blood cultures are NGTD 72 hours  - Continue to follow CSF 4/13 negative and pleural cultures 4/16 negative. CSF findings on  4/13:  63 wbc, 82% PMN, protein 97.  - original infected port removed 4/12  - Neurosurgery Dr Oh was notified and no surgical intervention for  shunt  - vent management per pulmonology       45 min spent with 50% face to face care and critical care time involvement  Thank you for this consult. Case discussed with RN. D/w [facility] [place] lab.

## 2021-04-21 ENCOUNTER — APPOINTMENT (OUTPATIENT)
Dept: RADIOLOGY | Facility: MEDICAL CENTER | Age: 51
DRG: 003 | End: 2021-04-21
Attending: INTERNAL MEDICINE
Payer: MEDICARE

## 2021-04-21 LAB
ANION GAP SERPL CALC-SCNC: 7 MMOL/L (ref 7–16)
BASOPHILS # BLD AUTO: 0.2 % (ref 0–1.8)
BASOPHILS # BLD: 0.05 K/UL (ref 0–0.12)
BUN SERPL-MCNC: 21 MG/DL (ref 8–22)
CALCIUM SERPL-MCNC: 7.2 MG/DL (ref 8.5–10.5)
CFT BLD TEG: 7.1 MIN (ref 5–10)
CHLORIDE SERPL-SCNC: 92 MMOL/L (ref 96–112)
CLOT ANGLE BLD TEG: 76.1 DEGREES (ref 53–72)
CLOT LYSIS 30M P MA LENFR BLD TEG: 0 % (ref 0–8)
CO2 SERPL-SCNC: 28 MMOL/L (ref 20–33)
CREAT SERPL-MCNC: 0.82 MG/DL (ref 0.5–1.4)
CT.EXTRINSIC BLD ROTEM: 1.1 MIN (ref 1–3)
EOSINOPHIL # BLD AUTO: 0 K/UL (ref 0–0.51)
EOSINOPHIL NFR BLD: 0 % (ref 0–6.9)
ERYTHROCYTE [DISTWIDTH] IN BLOOD BY AUTOMATED COUNT: 51.6 FL (ref 35.9–50)
GLUCOSE SERPL-MCNC: 119 MG/DL (ref 65–99)
HCT VFR BLD AUTO: 22 % (ref 37–47)
HGB BLD-MCNC: 6.7 G/DL (ref 12–16)
HGB BLD-MCNC: 7.8 G/DL (ref 12–16)
IMM GRANULOCYTES # BLD AUTO: 1.74 K/UL (ref 0–0.11)
IMM GRANULOCYTES NFR BLD AUTO: 5.4 % (ref 0–0.9)
INR PPP: 1.21 (ref 0.87–1.13)
LYMPHOCYTES # BLD AUTO: 1.14 K/UL (ref 1–4.8)
LYMPHOCYTES NFR BLD: 3.5 % (ref 22–41)
MCF BLD TEG: 79.9 MM (ref 50–70)
MCH RBC QN AUTO: 29.8 PG (ref 27–33)
MCHC RBC AUTO-ENTMCNC: 35.5 G/DL (ref 33.6–35)
MCV RBC AUTO: 84 FL (ref 81.4–97.8)
MONOCYTES # BLD AUTO: 1.73 K/UL (ref 0–0.85)
MONOCYTES NFR BLD AUTO: 5.4 % (ref 0–13.4)
NEUTROPHILS # BLD AUTO: 27.49 K/UL (ref 2–7.15)
NEUTROPHILS NFR BLD: 85.5 % (ref 44–72)
NRBC # BLD AUTO: 0 K/UL
NRBC BLD-RTO: 0 /100 WBC
PLATELET # BLD AUTO: 476 K/UL (ref 164–446)
PMV BLD AUTO: 9.3 FL (ref 9–12.9)
POTASSIUM SERPL-SCNC: 3.9 MMOL/L (ref 3.6–5.5)
PROTHROMBIN TIME: 15.7 SEC (ref 12–14.6)
RBC # BLD AUTO: 2.62 M/UL (ref 4.2–5.4)
RHODAMINE-AURAMINE STN SPEC: NORMAL
SIGNIFICANT IND 70042: NORMAL
SITE SITE: NORMAL
SODIUM SERPL-SCNC: 127 MMOL/L (ref 135–145)
SOURCE SOURCE: NORMAL
TEG ALGORITHM TGALG: ABNORMAL
TRIGL SERPL-MCNC: 262 MG/DL (ref 0–149)
WBC # BLD AUTO: 32.2 K/UL (ref 4.8–10.8)

## 2021-04-21 PROCEDURE — 700102 HCHG RX REV CODE 250 W/ 637 OVERRIDE(OP): Performed by: INTERNAL MEDICINE

## 2021-04-21 PROCEDURE — 700111 HCHG RX REV CODE 636 W/ 250 OVERRIDE (IP): Performed by: INTERNAL MEDICINE

## 2021-04-21 PROCEDURE — 700101 HCHG RX REV CODE 250: Performed by: INTERNAL MEDICINE

## 2021-04-21 PROCEDURE — 85018 HEMOGLOBIN: CPT

## 2021-04-21 PROCEDURE — 700105 HCHG RX REV CODE 258: Performed by: INTERNAL MEDICINE

## 2021-04-21 PROCEDURE — A9270 NON-COVERED ITEM OR SERVICE: HCPCS | Performed by: INTERNAL MEDICINE

## 2021-04-21 PROCEDURE — P9016 RBC LEUKOCYTES REDUCED: HCPCS

## 2021-04-21 PROCEDURE — 85347 COAGULATION TIME ACTIVATED: CPT

## 2021-04-21 PROCEDURE — 94003 VENT MGMT INPAT SUBQ DAY: CPT

## 2021-04-21 PROCEDURE — 36430 TRANSFUSION BLD/BLD COMPNT: CPT

## 2021-04-21 PROCEDURE — 36556 INSERT NON-TUNNEL CV CATH: CPT | Mod: LT | Performed by: INTERNAL MEDICINE

## 2021-04-21 PROCEDURE — 99291 CRITICAL CARE FIRST HOUR: CPT | Mod: 25 | Performed by: INTERNAL MEDICINE

## 2021-04-21 PROCEDURE — 99292 CRITICAL CARE ADDL 30 MIN: CPT | Mod: 25 | Performed by: INTERNAL MEDICINE

## 2021-04-21 PROCEDURE — 84478 ASSAY OF TRIGLYCERIDES: CPT

## 2021-04-21 PROCEDURE — 770022 HCHG ROOM/CARE - ICU (200)

## 2021-04-21 PROCEDURE — C1751 CATH, INF, PER/CENT/MIDLINE: HCPCS

## 2021-04-21 PROCEDURE — 36556 INSERT NON-TUNNEL CV CATH: CPT

## 2021-04-21 PROCEDURE — 700111 HCHG RX REV CODE 636 W/ 250 OVERRIDE (IP)

## 2021-04-21 PROCEDURE — 85025 COMPLETE CBC W/AUTO DIFF WBC: CPT

## 2021-04-21 PROCEDURE — 80048 BASIC METABOLIC PNL TOTAL CA: CPT

## 2021-04-21 PROCEDURE — 700117 HCHG RX CONTRAST REV CODE 255: Performed by: INTERNAL MEDICINE

## 2021-04-21 PROCEDURE — 86923 COMPATIBILITY TEST ELECTRIC: CPT

## 2021-04-21 PROCEDURE — 70496 CT ANGIOGRAPHY HEAD: CPT

## 2021-04-21 PROCEDURE — 85384 FIBRINOGEN ACTIVITY: CPT

## 2021-04-21 PROCEDURE — 85576 BLOOD PLATELET AGGREGATION: CPT

## 2021-04-21 PROCEDURE — 71045 X-RAY EXAM CHEST 1 VIEW: CPT

## 2021-04-21 PROCEDURE — 94799 UNLISTED PULMONARY SVC/PX: CPT

## 2021-04-21 PROCEDURE — 85610 PROTHROMBIN TIME: CPT

## 2021-04-21 PROCEDURE — 71250 CT THORAX DX C-: CPT | Mod: ME

## 2021-04-21 PROCEDURE — 02HV33Z INSERTION OF INFUSION DEVICE INTO SUPERIOR VENA CAVA, PERCUTANEOUS APPROACH: ICD-10-PCS | Performed by: INTERNAL MEDICINE

## 2021-04-21 RX ORDER — DEXMEDETOMIDINE HYDROCHLORIDE 4 UG/ML
.1-1.5 INJECTION, SOLUTION INTRAVENOUS CONTINUOUS
Status: DISCONTINUED | OUTPATIENT
Start: 2021-04-21 | End: 2021-04-22

## 2021-04-21 RX ORDER — FUROSEMIDE 10 MG/ML
40 INJECTION INTRAMUSCULAR; INTRAVENOUS
Status: DISCONTINUED | OUTPATIENT
Start: 2021-04-21 | End: 2021-04-26

## 2021-04-21 RX ORDER — LORAZEPAM 2 MG/ML
1-2 INJECTION INTRAMUSCULAR
Status: DISCONTINUED | OUTPATIENT
Start: 2021-04-21 | End: 2021-04-22

## 2021-04-21 RX ADMIN — PAROXETINE HYDROCHLORIDE 10 MG: 20 TABLET, FILM COATED ORAL at 05:34

## 2021-04-21 RX ADMIN — METHYLPREDNISOLONE SODIUM SUCCINATE 40 MG: 40 INJECTION, POWDER, FOR SOLUTION INTRAMUSCULAR; INTRAVENOUS at 11:52

## 2021-04-21 RX ADMIN — DULOXETINE HYDROCHLORIDE 60 MG: 60 CAPSULE, DELAYED RELEASE ORAL at 05:33

## 2021-04-21 RX ADMIN — Medication 400 MCG/HR: at 14:17

## 2021-04-21 RX ADMIN — Medication 2500 MCG: at 21:35

## 2021-04-21 RX ADMIN — Medication 500 MCG/HR: at 16:55

## 2021-04-21 RX ADMIN — RISPERIDONE 2 MG: 1 TABLET, FILM COATED ORAL at 17:36

## 2021-04-21 RX ADMIN — NAFCILLIN SODIUM 2 G: 2 INJECTION, POWDER, FOR SOLUTION INTRAMUSCULAR; INTRAVENOUS at 09:34

## 2021-04-21 RX ADMIN — NAFCILLIN SODIUM 2 G: 2 INJECTION, POWDER, FOR SOLUTION INTRAMUSCULAR; INTRAVENOUS at 03:27

## 2021-04-21 RX ADMIN — NOREPINEPHRINE BITARTRATE 10 MCG/MIN: 1 INJECTION, SOLUTION, CONCENTRATE INTRAVENOUS at 14:28

## 2021-04-21 RX ADMIN — Medication 400 MCG/HR: at 07:36

## 2021-04-21 RX ADMIN — DULOXETINE HYDROCHLORIDE 60 MG: 60 CAPSULE, DELAYED RELEASE ORAL at 17:36

## 2021-04-21 RX ADMIN — RISPERIDONE 2 MG: 1 TABLET, FILM COATED ORAL at 05:34

## 2021-04-21 RX ADMIN — FUROSEMIDE 40 MG: 10 INJECTION, SOLUTION INTRAMUSCULAR; INTRAVENOUS at 07:37

## 2021-04-21 RX ADMIN — PROPOFOL 10 MCG/KG/MIN: 10 INJECTION, EMULSION INTRAVENOUS at 14:29

## 2021-04-21 RX ADMIN — DIVALPROEX SODIUM 500 MG: 125 CAPSULE ORAL at 21:34

## 2021-04-21 RX ADMIN — DOCUSATE SODIUM 50 MG AND SENNOSIDES 8.6 MG 2 TABLET: 8.6; 5 TABLET, FILM COATED ORAL at 05:33

## 2021-04-21 RX ADMIN — METHYLPREDNISOLONE SODIUM SUCCINATE 40 MG: 40 INJECTION, POWDER, FOR SOLUTION INTRAMUSCULAR; INTRAVENOUS at 23:50

## 2021-04-21 RX ADMIN — NAFCILLIN SODIUM 2 G: 2 INJECTION, POWDER, FOR SOLUTION INTRAMUSCULAR; INTRAVENOUS at 17:37

## 2021-04-21 RX ADMIN — PROPOFOL 25 MCG/KG/MIN: 10 INJECTION, EMULSION INTRAVENOUS at 03:31

## 2021-04-21 RX ADMIN — METHYLPREDNISOLONE SODIUM SUCCINATE 40 MG: 40 INJECTION, POWDER, FOR SOLUTION INTRAMUSCULAR; INTRAVENOUS at 05:34

## 2021-04-21 RX ADMIN — DEXMEDETOMIDINE 1.5 MCG/KG/HR: 200 INJECTION, SOLUTION INTRAVENOUS at 18:28

## 2021-04-21 RX ADMIN — NAFCILLIN SODIUM 2 G: 2 INJECTION, POWDER, FOR SOLUTION INTRAMUSCULAR; INTRAVENOUS at 06:09

## 2021-04-21 RX ADMIN — POTASSIUM BICARBONATE 25 MEQ: 978 TABLET, EFFERVESCENT ORAL at 09:27

## 2021-04-21 RX ADMIN — DIVALPROEX SODIUM 500 MG: 125 CAPSULE ORAL at 14:52

## 2021-04-21 RX ADMIN — DOCUSATE SODIUM 50 MG AND SENNOSIDES 8.6 MG 2 TABLET: 8.6; 5 TABLET, FILM COATED ORAL at 17:36

## 2021-04-21 RX ADMIN — LORAZEPAM 2 MG: 2 INJECTION INTRAMUSCULAR; INTRAVENOUS at 22:30

## 2021-04-21 RX ADMIN — DEXMEDETOMIDINE 0.5 MCG/KG/HR: 200 INJECTION, SOLUTION INTRAVENOUS at 11:48

## 2021-04-21 RX ADMIN — NAFCILLIN SODIUM 2 G: 2 INJECTION, POWDER, FOR SOLUTION INTRAMUSCULAR; INTRAVENOUS at 23:50

## 2021-04-21 RX ADMIN — DEXMEDETOMIDINE 0.8 MCG/KG/HR: 200 INJECTION, SOLUTION INTRAVENOUS at 14:29

## 2021-04-21 RX ADMIN — Medication 2500 MCG: at 01:44

## 2021-04-21 RX ADMIN — PROPOFOL 15 MCG/KG/MIN: 10 INJECTION, EMULSION INTRAVENOUS at 13:03

## 2021-04-21 RX ADMIN — FAMOTIDINE 20 MG: 20 TABLET ORAL at 17:36

## 2021-04-21 RX ADMIN — IOHEXOL 80 ML: 350 INJECTION, SOLUTION INTRAVENOUS at 16:30

## 2021-04-21 RX ADMIN — NAFCILLIN SODIUM 2 G: 2 INJECTION, POWDER, FOR SOLUTION INTRAMUSCULAR; INTRAVENOUS at 14:52

## 2021-04-21 RX ADMIN — NOREPINEPHRINE BITARTRATE 10 MCG/MIN: 1 INJECTION, SOLUTION, CONCENTRATE INTRAVENOUS at 09:24

## 2021-04-21 RX ADMIN — DIVALPROEX SODIUM 500 MG: 125 CAPSULE ORAL at 05:33

## 2021-04-21 RX ADMIN — FAMOTIDINE 20 MG: 20 TABLET ORAL at 05:34

## 2021-04-21 RX ADMIN — DEXMEDETOMIDINE 1.5 MCG/KG/HR: 200 INJECTION, SOLUTION INTRAVENOUS at 21:38

## 2021-04-21 RX ADMIN — METHYLPREDNISOLONE SODIUM SUCCINATE 40 MG: 40 INJECTION, POWDER, FOR SOLUTION INTRAMUSCULAR; INTRAVENOUS at 17:36

## 2021-04-21 ASSESSMENT — FIBROSIS 4 INDEX: FIB4 SCORE: 0.4

## 2021-04-21 NOTE — CARE PLAN
Problem: Nutritional:  Goal: Nutrition support tolerated and meeting greater than 85% of estimated needs  Outcome: PROGRESSING AS EXPECTED     TF @ goal with propofol.   Sedation is being adjusted.   TF goal off propofol will be Impact Peptide 1.5 @ 45 ml/hr.

## 2021-04-21 NOTE — CARE PLAN
Problem: Safety  Goal: Will remain free from injury  4/20/2021 1841 by Camelia Flower R.N.  Outcome: PROGRESSING AS EXPECTED  4/20/2021 1841 by Camelia lFower R.N.  Outcome: PROGRESSING AS EXPECTED  Goal: Will remain free from falls  4/20/2021 1841 by Camelia Flower RCANDACE.  Outcome: PROGRESSING AS EXPECTED  4/20/2021 1841 by Camelia Flower R.N.  Outcome: PROGRESSING AS EXPECTED     Problem: Venous Thromboembolism (VTW)/Deep Vein Thrombosis (DVT) Prevention:  Goal: Patient will participate in Venous Thrombosis (VTE)/Deep Vein Thrombosis (DVT)Prevention Measures  4/20/2021 1841 by Camelia Flower R.N.  Outcome: PROGRESSING AS EXPECTED  4/20/2021 1841 by Camelia Flower R.N.  Outcome: PROGRESSING AS EXPECTED     Problem: Safety - Medical Restraint  Goal: Remains free of injury from restraints (Restraint for Interference with Medical Device)  Description: INTERVENTIONS:  1. Determine that other, less restrictive measures have been tried or would not be effective before applying the restraint  2. Evaluate the patient's condition at the time of restraint application  3. Inform patient/family regarding the reason for restraint  4. Q2H: Monitor safety, psychosocial status, comfort, nutrition and hydration  4/20/2021 1841 by Camelia Flower, R.ELISE.  Outcome: PROGRESSING AS EXPECTED  4/20/2021 1841 by Camelia Flower R.ELISE.  Outcome: PROGRESSING AS EXPECTED     Problem: Pain Management  Goal: Pain level will decrease to patient's comfort goal  4/20/2021 1841 by Camelia Flower R.ELISE.  Outcome: PROGRESSING AS EXPECTED  4/20/2021 1841 by Camelia Flower, R.N.  Outcome: PROGRESSING AS EXPECTED     Problem: Communication  Goal: The ability to communicate needs accurately and effectively will improve  4/20/2021 1841 by Camelia Flower RCANDACE.  Outcome: PROGRESSING SLOWER THAN EXPECTED  4/20/2021 1841 by Camelia Flower R.N.  Outcome: PROGRESSING SLOWER THAN  EXPECTED     Problem: Safety - Medical Restraint  Goal: Free from restraint(s) (Restraint for Interference with Medical Device)  Description: INTERVENTIONS:  1. ONCE/SHIFT or MINIMUM Q12H: Assess and document the continuing need for restraints  2. Q24H: Continued use of restraint requires LIP to perform face to face examination and written order  3. Identify and implement measures to help patient regain control  4/20/2021 1841 by Camelia Flower, R.N.  Outcome: PROGRESSING SLOWER THAN EXPECTED  4/20/2021 1841 by Camelia Flower, R.N.  Outcome: PROGRESSING SLOWER THAN EXPECTED

## 2021-04-21 NOTE — PROGRESS NOTES
Lab called with critical result of HBG, HCT, WBC at 1725. Critical lab result read back to 1725.   Dr. Burgos notified of critical lab result at 1725.  Critical lab result read back by Dr. Burgos.

## 2021-04-21 NOTE — PROCEDURES
Procedures  Procedure: left subclavian central line insertion, us guided    : Dr Burgos    Reason: Septic shock requiring vasopressors and respiratory failure with hypoxia    The patient's left shoulder region was prepped and draped in sterile fashion using chlorhexidine scrub. Anesthesia was achieved with 1% lidocaine. The left subclavian vein was accessed under ultrasound guidance using a finder needle Venous blood was withdrawn and the needle was withdrawn after a guidewire was advanced through the needle catheter. A small incision was made with a blade scalpel and the needle was exchanged for a dilator over the guidewire until appropriate dilation was obtained. The dilator was removed and a central venous triple-lumen catheter was advanced over the guidewire and secured into place with 2 sutures at 15 cm. At time of procedure completion, all ports aspirated and flushed properly. Post-procedure x-ray adequate placement, no pneumothorax.  Sterile procedure done throughout entire procedure by all participating.    Complications: none    Blood loss: < 3 cc

## 2021-04-21 NOTE — DISCHARGE PLANNING
Care Transition Team Assessment  NOK spouse Benoit @ [phone].  [facility] acquired information below from pt's spouse. Pt is intubated and unable to answer d/c planning questions.     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 She was readmitted, and last d/c was from T2/CDU earlier this same month.    Pt uses a cane and leans on her spouse when ambulating. Pt has O2 for last 2 yrs at home, but spouse does not know the name of the company. Pt has a concentrator at home, but no portable bottles.    Pt needs her spouses assistance w/ the following I/ADLs:  Bathing, dressing, cooking, shopping, driving, housework, medication management.  Pt is able to use the phone independently.    Pt's support system is her spouse and her son. Also, pt's mom is listed as 2nd emergency contact on face sheet.    Pt's pharmacy is mail order & [facility] on [address].  Pt's PCP is accurate on face sheet Elliott Power MD.    Information Source  Orientation : Unable to Assess  Information Given By: Spouse  Informant's Name: Benoit Pang  Who is responsible for making decisions for patient? : Spouse  Name(s) of Primary Decision Maker: Benoit Pang    Readmission Evaluation  Is this a readmission?: (4.9.21 CDU d/c)    Elopement Risk  Legal Hold: No  Ambulatory or Self Mobile in Wheelchair: No-Not an Elopement Risk  Elopement Risk: Not at Risk for Elopement    Interdisciplinary Discharge Planning  Primary Care Physician: Elliott Power MD  Lives with - Patient's Self Care Capacity: Spouse  Support Systems: Children, Family Member(s), Spouse / Significant Other(son and )  Housing / Facility: 3 Story Apartment / Condo  Do You Take your Prescribed Medications Regularly: Yes  Mobility Issues: Yes(uses cane and leans on 's arm)  Assistance Needed: (spouse assist w/ bathing, dressing, meds, meals, housekeepg )  Durable Medical Equipment: Home Oxygen  DME Provider / Phone:  (spouse does not remember name, pt has had concntrtr 2yrs)    Discharge Preparedness  Prior Functional Level: Ambulatory, Needs Assist with Activities of Daily Living, Needs Assist with Medication Management  Difficulity with ADLs: Bathing, Dressing  Difficulty with ADLs Comment: spouse cgs  Difficulity with IADLs: Cooking, Driving, Laundry, Managing medication, Shopping  Difficulity with IADL Comments: spouse cgs    Functional Assesment  Prior Functional Level: Ambulatory, Needs Assist with Activities of Daily Living, Needs Assist with Medication Management    Finances  Financial Barriers to Discharge: (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

## 2021-04-21 NOTE — PROGRESS NOTES
"Critical Care Progress Note    Date of admission  4/16/2021    Chief Complaint  \"50 y.o. female the past medical history of pseudotumor cerebri status post  shunt by Dr. Oh, chronic pain with history of placement of nerve stimulator, vasculitis, right anterior chest port for home IV meds with recurrent infection who presented 4/11/2021 with to [facility] with recurrent port infection and was initially treated with vancomycin and Zosyn and then changed to ceftaroline and subsequently switched to nafcillin when MSSA was identified.  [facility] infectious disease is managing antibiotics.  Dr. Candelaria removed the port on 4/12.  Imaging suggest tricuspid valve endocarditis.  Lumbar puncture performed 4/14 showed pleocytosis with negative Gram stain and culture so far.  She had worsening leukocytosis with WBC up to 35K and chest CT yesterday revealed septic emboli with greater involvement in the right lung as well as a large right pleural effusion.  IR performed a thoracentesis and placed a small pigtail catheter in the right chest which is now at 20 cm of suction.  There were significant loculations and fluid analysis suggested empyema.  Patient subsequently developed increased work of breathing and was intubated and placed on the ventilator yesterday.  Hemoglobin dropped from 8.3-6.9 as they were considering TPA and dornase via this pigtail catheter and instead they transfused 1 unit of blood.  The plan was to consult thoracic surgery but their only surgeon is out of town till next week.  Dr. Oh consulted on 4/14 and recommended brain MRI and it was ordered but is still not performed (nerve stimulator not MRI compatible?).  Dr. Oh did not recommend removal of shunt at this time apparently.  The patient's had some hypotension while on the ventilator requiring titration of norepinephrine and vasopressin.  The patient is transferred to [facility] for surgery consultation and Dr. Albert Benedict excepted the patient along with  " "[place] on transfer.\"    Hospital Course  4/16 admitted to ICU  4/17 VD 2, 2 FFP, pRBC overnight; new chest tube per gen surg  4/18 VD 3, TPa/Dornase with 1400 cc from R chest tube    Interval Problem Update  Reviewed last 24 hour events:  Hb 7.8  Tolerated 4 doses tpa/dornase, held 5th dose because of bleeding requiring 2 prbc in same 12 hours 4/20  Repeat ct chest w/o contrast  On mechanical ventilator  Bal pending from 4/20 bronch  Diuresed well, increased pressors and variable IVC 4/20, now allowing to re-gain fluids, keep near net zero  Keep chest tube to suction  Propofol and fentanyl for sedation  Levophed for map > 65 and sbp > 90  Bleeding around chest tube and air leak with subcu air  TEG plt function adequate  Held heparin since 4/20    Addendum  Ct head contrast no significant changes for infection  Ct chest right hydropneumothorax, likely most of fluid removed but lung continues to have thick inflammatory tissue  Chest tube to suction  Left side loculated effusion left, per my bedside us very small window, reassess tomorrow.  IR order for chest tube placement ordered  Hb 6.7, 1 u prbc  Worsening/new cavitary lung lesions when compared to ct outside 4/16  Loculated effusion left side new.    Review of Systems  Review of Systems   Unable to perform ROS: Intubated        Vital Signs for last 24 hours   Temp:  [37.4 °C (99.3 °F)-37.7 °C (99.9 °F)] 37.4 °C (99.3 °F)  Pulse:  [] 102  Resp:  [17-34] 20  BP: ()/(32-74) 102/60  SpO2:  [89 %-100 %] 100 %    Hemodynamic parameters for last 24 hours  CVP:  [243 MM HG-280 MM HG] 261 MM HG    Respiratory Information for the last 24 hours  Vent Mode: APVCMV  Rate (breaths/min): 20  Vt Target (mL): 320  PEEP/CPAP: 8  MAP: 11  Control VTE (exp VT): 315    Physical Exam   Physical Exam  Vitals and nursing note reviewed.   Constitutional:       Appearance: She is ill-appearing.   HENT:      Head: Normocephalic and atraumatic.      Right Ear: External ear normal. "      Left Ear: External ear normal.      Nose: Nose normal.      Mouth/Throat:      Mouth: Mucous membranes are moist.   Eyes:      Pupils: Pupils are equal, round, and reactive to light.   Cardiovascular:      Rate and Rhythm: Normal rate and regular rhythm.      Pulses: Normal pulses.      Heart sounds: Murmur present. No gallop.       Comments: R sided chest tube  Pulmonary:      Comments: Equal and symmetric breath sounds to mechanical ventilation  Abdominal:      General: Abdomen is flat. There is no distension.      Palpations: Abdomen is soft.      Tenderness: There is no abdominal tenderness. There is no guarding or rebound.   Musculoskeletal:      Cervical back: No rigidity.      Right lower leg: No edema.      Left lower leg: No edema.   Lymphadenopathy:      Cervical: No cervical adenopathy.   Skin:     General: Skin is warm and dry.      Capillary Refill: Capillary refill takes less than 2 seconds.   Neurological:      Comments: Grimaces, moves all 4 extremities         Medications  Current Facility-Administered Medications   Medication Dose Route Frequency Provider Last Rate Last Admin   • potassium bicarbonate (KLYTE) effervescent tablet 25 mEq  25 mEq Enteral Tube Once Viraj Burgos M.D.       • midazolam (Versed) 2 MG/2ML injection 2-4 mg  2-4 mg Intravenous Once Viraj Burgos M.D.       • fentaNYL (SUBLIMAZE) injection  mcg   mcg Intravenous Once Viraj Burgos M.D.       • methylPREDNISolone (SOLU-MEDROL) 40 MG injection 40 mg  40 mg Intravenous Q6HRS Viraj Burgos M.D.   40 mg at 04/21/21 0527   • fentaNYL (SUBLIMAZE) 50 mcg/mL in 50mL   Intravenous Continuous Viraj Burgos M.D. 8 mL/hr at 04/20/21 1958 2,500 mcg at 04/21/21 0144   • Pharmacy Consult: Enteral tube insertion - review meds/change route/product selection  1 Each Other PHARMACY TO DOSE Elliott Salvador M.D.       • acetaminophen (Tylenol) tablet 650 mg  650 mg Enteral Tube Q4HRS PRN Elliott Salvador M.D.       •  magnesium hydroxide (MILK OF MAGNESIA) suspension 30 mL  30 mL Enteral Tube QDAY PRN Elliott Salvador M.D.        And   • senna-docusate (PERICOLACE or SENOKOT S) 8.6-50 MG per tablet 2 tablet  2 tablet Enteral Tube BID Elliott Salvador M.D.   2 tablet at 04/21/21 0533    And   • polyethylene glycol/lytes (MIRALAX) PACKET 1 Packet  1 Packet Enteral Tube QDAY PRN Elliott Salvador M.D.        And   • bisacodyl (DULCOLAX) suppository 10 mg  10 mg Rectal QDAY PRN Elliott Salvador M.D.       • divalproex (DEPAKOTE SPRINKLE) capsule 500 mg  500 mg Enteral Tube Q8HRS Elliott Salvador M.D.   500 mg at 04/21/21 0533   • DULoxetine (CYMBALTA) capsule 60 mg  60 mg Enteral Tube BID Elliott Salvador M.D.   60 mg at 04/21/21 0533   • PARoxetine (PAXIL) tablet 10 mg  10 mg Enteral Tube QAM Elliott Salvador M.D.   10 mg at 04/21/21 0534   • risperiDONE (RISPERDAL) tablet 2 mg  2 mg Enteral Tube BID Elliott Salvador M.D.   2 mg at 04/21/21 0534   • Respiratory Therapy Consult   Nebulization Continuous RT Man Wahl M.D.       • famotidine (PEPCID) tablet 20 mg  20 mg Enteral Tube Q12HRS Man Wahl M.D.   20 mg at 04/21/21 0534    Or   • famotidine (PEPCID) injection 20 mg  20 mg Intravenous Q12HRS Man Wahl M.D.   20 mg at 04/17/21 1747   • MD Alert...ICU Electrolyte Replacement per Pharmacy   Other PHARMACY TO DOSE Man Wahl M.D.       • lidocaine (XYLOCAINE) 1 % injection 1-2 mL  1-2 mL Tracheal Tube Q30 MIN PRN Man Wahl M.D.       • ipratropium-albuterol (DUONEB) nebulizer solution  3 mL Nebulization Q2HRS PRN (RT) Man Wahl M.D.       • fentaNYL (SUBLIMAZE) injection 25 mcg  25 mcg Intravenous Q HOUR PRN Man B Richeson, M.D.        Or   • fentaNYL (SUBLIMAZE) injection 50 mcg  50 mcg Intravenous Q HOUR PRN Man Wahl M.D.        Or   • fentaNYL (SUBLIMAZE) injection 100 mcg  100 mcg Intravenous Q HOUR PRN Man Wahl M.D.   100 mcg at 04/17/21 0523    • propofol (DIPRIVAN) injection  0-80 mcg/kg/min Intravenous Continuous Man Wahl M.D. 11.3 mL/hr at 04/21/21 0331 25 mcg/kg/min at 04/21/21 0331   • norepinephrine (Levophed) infusion 8 mg/250 mL (premix)  0-30 mcg/min Intravenous Continuous aMn Wahl M.D. 18.8 mL/hr at 04/21/21 0633 10 mcg/min at 04/21/21 0633   • nafcillin 2 g in dextrose 5% 100 mL IVPB  2 g Intravenous Q4HR Viraj Burgos M.D.   Stopped at 04/21/21 0639       Fluids    Intake/Output Summary (Last 24 hours) at 4/21/2021 0706  Last data filed at 4/21/2021 0633  Gross per 24 hour   Intake 3748.42 ml   Output 2640 ml   Net 1108.42 ml       Laboratory  Recent Labs     04/19/21  0429 04/20/21  0258   ISTATAPH 7.453 7.483   ISTATAPCO2 36.8 38.1*   ISTATAPO2 93* 70   ISTATATCO2 27 30   UGXQDRA1OBL 98 95   ISTATARTHCO3 25.7* 28.6*   ISTATARTBE 2 5*   ISTATTEMP 37.3 C 37.4 C   ISTATFIO2 30 30   ISTATSPEC Arterial Arterial   ISTATAPHTC 7.449 7.477   PSAXCNXJ5LM 94* 72         Recent Labs     04/19/21  0400 04/20/21  0300 04/21/21  0320   SODIUM 130* 128* 127*   POTASSIUM 3.1* 3.5* 3.9   CHLORIDE 96 93* 92*   CO2 26 27 28   BUN 13 14 21   CREATININE 0.56 0.59 0.82   MAGNESIUM 1.8 2.0  --    PHOSPHORUS 4.7*  --   --    CALCIUM 7.0* 6.8* 7.2*     Recent Labs     04/19/21  0400 04/20/21  0300 04/21/21  0320   ALTSGPT 16  --   --    ASTSGOT 16  --   --    ALKPHOSPHAT 599*  --   --    TBILIRUBIN 1.9*  --   --    PREALBUMIN 6.5*  --   --    GLUCOSE 136* 107* 119*     Recent Labs     04/19/21  0400 04/19/21  0400 04/20/21  0300 04/20/21  1620 04/21/21  0320   WBC 21.8*   < > 24.1* 32.5* 32.2*   NEUTSPOLYS 86.10*  --  86.10*  --  85.50*   LYMPHOCYTES 2.60*  --  6.10*  --  3.50*   MONOCYTES 6.10  --  1.70  --  5.40   EOSINOPHILS 0.00  --  0.00  --  0.00   BASOPHILS 0.00  --  0.00  --  0.20   ASTSGOT 16  --   --   --   --    ALTSGPT 16  --   --   --   --    ALKPHOSPHAT 599*  --   --   --   --    TBILIRUBIN 1.9*  --   --   --   --     < > = values  in this interval not displayed.     Recent Labs     04/20/21  0300 04/20/21  0300 04/20/21  0840 04/20/21  1620 04/21/21  0320   RBC 2.25*  --   --  2.12* 2.62*   HEMOGLOBIN 6.2*   < > 7.4* 6.0* 7.8*   HEMATOCRIT 18.2*  --   --  17.4* 22.0*   PLATELETCT 300  --   --  438 476*   PROTHROMBTM  --   --   --   --  15.7*   INR  --   --   --   --  1.21*    < > = values in this interval not displayed.       Imaging  X-Ray:  I have personally reviewed the images and compared with prior images.    Assessment/Plan  * Septic shock (HCC)  Assessment & Plan  This is Septic shock Present on admission  SIRS criteria identified on my evaluation include: Tachypnea, with respirations greater than 20 per minute and Leukocytosis, with WBC greater than 12,000  Source is right anterior chest port, MSSA  Suspected onset of infection unknown  Patient developed hypotension at transferring facility [facility], resuscitation complete there, currently titrating norepinephrine/vasopressin MAP > 65  IV antibiotics as appropriate for source of sepsis  Reassessment: I have reassessed the patient's hemodynamic status    MSSA endocarditis + R lung empyema and bacteremia   Nafcillin/cefepime, extended course  Negative cultures since 4/17  Replace central line 4/21  Nataly ID recommendations appreciated      Pleural effusion, right -concern of empyema/hemothorax  Assessment & Plan  Right-sided effusion associated with infected catheter right anterior chest and septic emboli, possible empyema  Status post chest tube placement 4/16  At [facility] complicated by some bleeding  Replaced 4/18 Dr Benedict  Surgery following, high risk for surgical intervention  Tolerated 4 days tpa/dornase then required mult transfusions and more gross blood than blood w/ chunks of loculations at that point  Adequate response  Repeat ct chest    Right-sided acute bacterial endocarditis  Assessment & Plan  Cultures growing MSSA at [facility]  Infected right anterior chest port/line removed  CTA  chest 4/15 with multiple septic emboli worse on the right complicated by large right effusion with loculations  Echocardiogram 4/12 reveals echogenic mobile lesion on tricuspid valve RVSP 30, EF 55%  Extended antibiotics    Acute respiratory failure with hypoxia (HCC)  Assessment & Plan  Intubated 4/15 for respiratory failure at [facility]  Lung protective ventilation strategies  Optimize oxygenation, ventilation, and acid base balance.   ABCEDF bundle  SAT/SBT   Chest tube to suction  4 doses tpa/dornase, poor tolerance but adequate effect    Acute encephalopathy- (present on admission)  Assessment & Plan  Multifactorial including septic and metabolic  Status post lumbar puncture with cytosis but negative Gram stain and cultures NGTD   shunt for pseudotumor cerebri  Not candidate for MRI    Pneumonia  Assessment & Plan  MSSA septic emboli with empyema     Bacteremia due to methicillin susceptible Staphylococcus aureus (MSSA)  Assessment & Plan  Source presumed right anterior chest line/port  Recurrent line/port infection  Blood cultures positive for MSSA  CT imaging suggestive of empyema and septic emboli    Repeat blood cultures every 48 hours until negative  Nafcillin and cefepime, extended course      CSF pleocytosis  Assessment & Plan  Status post lumbar puncture 4/13  WBC 53  Glucose 47, protein 97 Gram stain negative and culture negative so far  Neurosurgery aware,  shunt in place  MRI brain requested by neurosurgery at [facility], not candidate      Acute blood loss anemia  Assessment & Plan  Hemoglobin dropped from 8.3->6.4 on 4/16, repeat hemoglobin pending  Status post transfusion 1 unit of blood 4/16 at [facility]    Plan:  Q12 hour H/H  Conservative transfusion goal to hemoglobin > 7  Reverse coagulopathy   prbc given again 4/20, 2 u required  Stopped tpa/dornase after 4 doses    Thrombocytopenia (HCC)- (present on admission)  Assessment & Plan  Sepsis induced  teg adequate plt function    S/P  shunt- (present on  admission)  Assessment & Plan  History of pseudotumor cerebri  History of  shunt by Dr. Oh  MRI brain pending, ordered 4/14 by Dr Oh; awaiting MRI compatibility clarification  4/14 lumbar puncture did reveal pleocytosis but Gram stain negative cultures NGTD  Follow CSF cultures, ID following    History of vasculitis  Assessment & Plan  Monitor    Prolonged Q-T interval on ECG  Assessment & Plan  Avoid QT prolonging agents as able  Optimize electrolytes  Cardiac monitoring  EKG    History of complicated migraines- (present on admission)  Assessment & Plan  Resume home regimen when clinically appropriate    H/O: CVA (cerebrovascular accident)- (present on admission)  Assessment & Plan  Monitor      Chronic pain syndrome- (present on admission)  Assessment & Plan  Chronic back pain  History of nerve stimulator     Plan:  Resume home regimen as clinically appropriate       VTE:  Heparin SQ  Ulcer: H2 Antagonist  Lines: Central Line  Ongoing indication addressed, Arterial Line  Ongoing indication addressed and Lopez Catheter  Ongoing indication addressed    I have performed a physical exam and reviewed and updated ROS and Plan today (4/21/2021). In review of yesterday's note (4/20/2021), there are no changes except as documented above.     Discussed patient condition and risk of morbidity and/or mortality with Hospitalist, Family, RN, RT, Pharmacy and Charge nurse / hot rounds     The patient remains critically ill.  On mechanical ventilator.  Chest tube in place to suction, bloody output.  Propofol and fentanyl drips for sedation.  Levophed for map > 65 and sbp > 90.  Critical care time = 80 minutes in directly providing and coordinating critical care and extensive data review.  No time overlap and excludes procedures.

## 2021-04-21 NOTE — CARE PLAN
Problem: Safety  Goal: Will remain free from injury  Outcome: PROGRESSING AS EXPECTED  Goal: Will remain free from falls  Outcome: PROGRESSING AS EXPECTED     Problem: Bowel/Gastric:  Goal: Normal bowel function is maintained or improved  Outcome: PROGRESSING AS EXPECTED  Goal: Will not experience complications related to bowel motility  Outcome: PROGRESSING AS EXPECTED     Problem: Safety - Medical Restraint  Goal: Remains free of injury from restraints (Restraint for Interference with Medical Device)  Description: INTERVENTIONS:  1. Determine that other, less restrictive measures have been tried or would not be effective before applying the restraint  2. Evaluate the patient's condition at the time of restraint application  3. Inform patient/family regarding the reason for restraint  4. Q2H: Monitor safety, psychosocial status, comfort, nutrition and hydration  Outcome: PROGRESSING AS EXPECTED  Goal: Free from restraint(s) (Restraint for Interference with Medical Device)  Description: INTERVENTIONS:  1. ONCE/SHIFT or MINIMUM Q12H: Assess and document the continuing need for restraints  2. Q24H: Continued use of restraint requires LIP to perform face to face examination and written order  3. Identify and implement measures to help patient regain control  Outcome: PROGRESSING AS EXPECTED

## 2021-04-21 NOTE — PROGRESS NOTES
Infectious Disease Daily Progress Note    Date of Service  4/21/2021    Chief Complaint  Nafcillin 2g Q4 hrs 4/15-present    Previous: Unasyn, Zosyn, Vanco, Daptomycin  Ceftaroline: start 4/13-4/15    4/14: Unable to give name of previous NSG or neurologist. Seems confused, but able to say some sentences fluently.   4/15: Line cultures now growing MSSA. As well as from the blood. Repeat blood culture 4/13+ in both sets. Patient has worsening confusion. CSF fluid analyses suggest pleocytosis. Cultures are no growth from the CSF. Chest CT was ordered this morning indicating a loculated right pleural effusion as well as bilateral septic emboli. Dr. Mack spoke with Dr. Oh yesterday and no surgical intervention unless clinical deterioration.  4/16: Increased respiratory distress.  Tachypneic.  CT with loculated collection.  Dr Resendiz not available.  No thoracic surgeon available.  Plans to have pulmonary place CT.  Transferring to ICU.  4/17: Transferred to [facility] last night. Hgb dropped to 6.4 requiring PRBC transfusion. Requiring 2 pressors. Fio2 50%. Febrile 38.8. Pending OR decortication of empyema and hemothorax. Chest tube placed at [facility] shows 8,371 WBC, ,000, 74% N  4/18: Chest tube was upsized by surgery yesterday, no decortication. WBC 22k. Fio2 40%. Vasopressin. Levophed down to 2mcg. Afebrile 24 hrs. Last fever 38.6 on 4/16.   4/19: Still on vent. Levo 3 mcg, vasopressin. Afebrile 72 hours. WBC 21k. BC 4/17 NGTD x 48 hours. Unable to do MRI brain given neurostimulator per RN.   4/20: Remaining afebrile. Hb 6.2 and undergoing transfusion today.WBC 24,100, 5% bands.1700 ml CT output. Copious bloody ET secretions. No pressors. Receiving dornase and TPA per CT. Right pleural effusion not large per CXR today.   4/21: WBC 31k. Bronchoscopy yesterday showing blood. Cultures sent. TPA on hold per RN due to arvind blood from chest tube requiring PRBC transfusion for hgb 6. Pending chest CT today. Per  , spinal stimulator is non-MRI compatible. Levophed 10mcg. 30% Fio2. Afebrile.     Review of Systems  Review of Systems   All other systems reviewed and are negative.       Physical Exam  Temp:  [37.4 °C (99.3 °F)-37.7 °C (99.9 °F)] 37.4 °C (99.3 °F)  Pulse:  [] 103  Resp:  [17-33] 20  BP: ()/(32-74) 99/50  SpO2:  [95 %-100 %] 97 %    Physical Exam  Constitutional:       Comments: Intubated sedated . Exam unchanged  HENT:      Head: Normocephalic.      Comments: ET tube  Cardiovascular:      Rate and Rhythm: Normal rate and regular rhythm.      Heart sounds: No murmur.   Pulmonary:      Comments: Diminished R side with rales. R chest tube with arvind blood.   Abdominal:      General: Abdomen is flat. Bowel sounds are normal. There is no distension.   Musculoskeletal:         General: No swelling or deformity.   Skin:     Comments: R chest tube. Stimulator palpable, non induratated  Fluids    Intake/Output Summary (Last 24 hours) at 4/21/2021 1025  Last data filed at 4/21/2021 1000  Gross per 24 hour   Intake 3558.53 ml   Output 2595 ml   Net 963.53 ml       Laboratory  Recent Labs     04/20/21  0300 04/20/21  0300 04/20/21  0840 04/20/21  1620 04/21/21  0320   WBC 24.1*  --   --  32.5* 32.2*   RBC 2.25*  --   --  2.12* 2.62*   HEMOGLOBIN 6.2*   < > 7.4* 6.0* 7.8*   HEMATOCRIT 18.2*  --   --  17.4* 22.0*   MCV 80.9*  --   --  83.0 84.0   MCH 27.6  --   --  28.3 29.8   MCHC 34.1  --   --  34.1 35.5*   RDW 55.6*  --   --  56.8* 51.6*   PLATELETCT 300  --   --  438 476*   MPV 9.8  --   --  9.7 9.3    < > = values in this interval not displayed.     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   -Acute septic emboli bilaterally  -Acute bilateral pneumonia due to above  -Pseudotumor cerebri with underlying  shunt  -Chronic migraine headaches  -History of autoimmune vasculitis  -Elevated alkaline phosphatase & bilirubin  -Acute encephalopathy due to sepsis staphylococcal, meningitis ruled out      - hemothorax R      - anemia due to above     Plan   Patient has evidence of disseminated staphylococcal sepsis source from her line with infection to her lungs, tricuspid valve and now has R empyema requiring transfer to [facility] for decortication due to lack of CT surgery support     - Continue Nafcillin for CNS penetration for now. Initial CSF does not show ventriculitis, but cannot rule out septic brain emboli and brain abscess. Unfortunately has a stimulator that is non compatible with MRI.   - US soft tissue to rule out stimulator abscess  - pending repeat Chest CT per primary team  - initial CSF cultures has pleocytosis, but no culture growth  - chest tube upsized by surgery 4/17.   - thoracentesis and pleural fluid consistent with empyema and hemothorax  - blood culture from 4/14 at [facility] positive.Admission blood cultures here on 4/16 both positive.  4/17 blood cultures NGTD  - OK to exchange infected central line today   - Continue to follow CSF 4/13 negative and pleural cultures 4/16 negative. CSF findings on 4/13:  63 wbc, 82% PMN, protein 97.  - original infected port removed 4/12  - Neurosurgery Dr Oh was notified and no surgical intervention for  shunt  - vent management per pulmonology  Condition remains critically ill     45 min spent with 50% face to face care and critical care time  involvement  Thank you for this consult. Case discussed with RN

## 2021-04-21 NOTE — PROGRESS NOTES
"    DATE: 4/21/2021    [facility] Day  5 Right empyema / septic shock /MSSA bacteremia /infective endocarditis..    Interval Events:     Remains acutely ill on full ventilator support  On Levophed infusion for blood pressure support due to septic shock  Pleural TPA held yesterday pm due to blood from CT  Transfused 2 units PRBC for low hemoglobin  Bloody secretions noted from endotracheal tube with suctioning    PHYSICAL EXAMINATION:  Constitutional:     Vital Signs: /57   Pulse 89   Temp 37.4 °C (99.3 °F) (Bladder)   Resp 20   Ht 1.6 m (5' 2.99\")   Wt 82.5 kg (181 lb 14.1 oz)   SpO2 97%         General Appearance: The patient is a critically ill-appearing and Intubated and sedated woman in mild distress.  HEENT:               The pupils are equal, round, and reactive to light bilaterally The nares and oropharynx are clear. The trachea is midline. No jugulovenous distension.  Respiratory:              Inspection: Unlabored respirations, no intercostal retractions, paradoxical motion, or accessory muscle use.   Right chest tube #1 -1280 cc bloody fluid /no air leak present              Palpation:  The chest is nontender.               Auscultation: normal.  Cardiovascular:              Inspection: The skin is warm, dry and well purfused.  Auscultation: Regular rate and rhythm.              Peripheral Pulses: Normal.   Abdomen:              Inspection: Abdominal inspection reveals no abrasions, contusions, lacerations or penetrating wounds.              Palpation: Palpation is remarkable for no significant tenderness, guarding, or peritoneal findings.    Laboratory Values:   Recent Labs     04/20/21  0300 04/20/21  0840 04/20/21  1620 04/21/21  0320 04/21/21  1500   WBC 24.1*  --  32.5* 32.2*  --    RBC 2.25*  --  2.12* 2.62*  --    HEMOGLOBIN 6.2*   < > 6.0* 7.8* 6.7*   HEMATOCRIT 18.2*  --  17.4* 22.0*  --    MCV 80.9*  --  83.0 84.0  --    MCH 27.6  --  28.3 29.8  --    MCHC 34.1  --  34.1 35.5*  " --    RDW 55.6*  --  56.8* 51.6*  --    PLATELETCT 300  --  438 476*  --    MPV 9.8  --  9.7 9.3  --     < > = values in this interval not displayed.     Recent Labs     04/19/21  0400 04/20/21  0300 04/21/21  0320   SODIUM 130* 128* 127*   POTASSIUM 3.1* 3.5* 3.9   CHLORIDE 96 93* 92*   CO2 26 27 28   GLUCOSE 136* 107* 119*   BUN 13 14 21   CREATININE 0.56 0.59 0.82   CALCIUM 7.0* 6.8* 7.2*     Recent Labs     04/19/21  0400 04/21/21  0320   ASTSGOT 16  --    ALTSGPT 16  --    TBILIRUBIN 1.9*  --    ALKPHOSPHAT 599*  --    GLOBULIN 3.8*  --    INR  --  1.21*     Recent Labs     04/21/21  0320   INR 1.21*        Imaging:   CT-CTA HEAD WITH & W/O-POST PROCESS   Final Result      1.  Patent carotid and vertebral arteries.      2.  Again seen right frontal the jugular peritoneal shunt catheter. There is mild increase in volume of the lateral and third ventricles.      CT-CHEST (THORAX) W/O   Final Result      1.  Interval placement of a right-sided chest tube into a large loculated right-sided pleural effusion. There is now seen a large right-sided hydropneumothorax in the area that previously seen fluid. The chest tube extends into that hydropneumothorax.    There is some residual pleural fluid seen as well. A hydropneumothorax is somewhat loculated with components most prominently inferiorly and medially.      2.  New loculated left pleural effusion.      3.  Again seen multiple bilateral cavitary pulmonary masses which are more prominent than previously seen with increasing cavitation and measure up to 3 cm size. Differential diagnosis includes septic emboli, multifocal abscesses and neoplasm.      4.  Large amount of subcutaneous emphysema on the right.      5.  Splenomegaly.      6.  Multiple support devices.      Fleischner Society pulmonary nodule recommendations:         DX-CHEST-LIMITED (1 VIEW)   Final Result      1.  Support devices as described above.      2.  Right chest tube present with a again seen  right-sided pneumothorax, possibly increased in size and loculated.      3.  Worsening bilateral pulmonary infiltrates.      DX-ABDOMEN FOR TUBE PLACEMENT   Final Result      Cortrak feeding tube tip projects in the region of the duodenal bulb.      DX-CHEST-LIMITED (1 VIEW)   Final Result      Loculated right pneumothorax is decreased in size compared to prior.      Small left pleural effusion.      Small loculated right pleural effusion.      Extensive bilateral airspace opacities are not significantly changed.      Soft tissue air on the right is again noted.      DX-CHEST-LIMITED (1 VIEW)   Final Result      Decreased partially loculated right pleural fluid with increased pleural air. Right chest tube is in place.      Increased hazy opacity in the left lung possibly atelectasis.         US-ABDOMEN LTD (SOFT TISSUE)   Final Result      No drainable fluid collection.      DX-CHEST-PORTABLE (1 VIEW)   Final Result      Decreased partially loculated right pleural fluid with increased pleural air. Right chest tube is in place.      No other significant change.      DX-CHEST-PORTABLE (1 VIEW)   Final Result         1.  Pulmonary edema and/or infiltrates are identified, which are somewhat decreased since the prior exam.   2.  Moderate loculated appearing right pleural effusion, slightly decreased since prior      DX-ABDOMEN FOR TUBE PLACEMENT   Final Result      Feeding tube tip at the distal stomach.      DX-CHEST-PORTABLE (1 VIEW)   Final Result         1.  New chest tube is noted in the right lower hemithorax.      2.  Right pleural effusion is again identified which appears similar to the prior exam.      3.  No new infiltrates or consolidations are identified.      DX-CHEST-PORTABLE (1 VIEW)   Final Result         1.  Pulmonary edema and/or infiltrates are identified, which are stable since the prior exam.   2.  Moderate loculated appearing right pleural effusion      DX-CHEST-PORTABLE (1 VIEW)   Final Result          No significant interval change.          ASSESSMENT AND PLAN:     Pleural effusion, right -concern of empyema/hemothorax  Assessment & Plan  4/17 Upsized pigtail to 36 Malian chest tube, serosanguineous fluid removed no active bleeding noted.  We will continue with drainage with appropriate sized chest tube and reassess in a.m.    4/20 Day #3 intrapleural fibrinolytic      DISPOSITION: Continue nonoperative management and close clinical observation.   CT chest today  Follow daily CXR       ____________________________________     Dillan Contreras M.D.    DD: 4/21/2021  4:34 PM

## 2021-04-22 ENCOUNTER — APPOINTMENT (OUTPATIENT)
Dept: RADIOLOGY | Facility: MEDICAL CENTER | Age: 51
DRG: 003 | End: 2021-04-22
Attending: INTERNAL MEDICINE
Payer: MEDICARE

## 2021-04-22 PROBLEM — J86.9 EMPYEMA OF LEFT PLEURAL SPACE (HCC): Status: ACTIVE | Noted: 2021-04-22

## 2021-04-22 PROBLEM — J90 PLEURAL EFFUSION ON LEFT: Status: ACTIVE | Noted: 2021-04-22

## 2021-04-22 PROBLEM — J86.9 EMPYEMA OF RIGHT PLEURAL SPACE (HCC): Status: ACTIVE | Noted: 2021-04-16

## 2021-04-22 LAB
ANION GAP SERPL CALC-SCNC: 8 MMOL/L (ref 7–16)
BACTERIA BLD CULT: NORMAL
BACTERIA BLD CULT: NORMAL
BARCODED ABORH UBTYP: 8400
BARCODED PRD CODE UBPRD: NORMAL
BARCODED UNIT NUM UBUNT: NORMAL
BASE EXCESS BLDA CALC-SCNC: 7 MMOL/L (ref -4–3)
BASOPHILS # BLD AUTO: 0.3 % (ref 0–1.8)
BASOPHILS # BLD: 0.07 K/UL (ref 0–0.12)
BODY TEMPERATURE: ABNORMAL DEGREES
BREATHS SETTING VENT: 20
BUN SERPL-MCNC: 20 MG/DL (ref 8–22)
CALCIUM SERPL-MCNC: 7.3 MG/DL (ref 8.5–10.5)
CFT BLD TEG: 7.7 MIN (ref 5–10)
CFT P HPASE BLD TEG: 7.9 MIN (ref 5–10)
CHLORIDE SERPL-SCNC: 97 MMOL/L (ref 96–112)
CLOT ANGLE BLD TEG: 75.8 DEGREES (ref 53–72)
CLOT ANGLE P HPASE BLD TEG: 80 DEGREES (ref 53–72)
CLOT INIT P HPASE BLD TEG: 0.8 MIN (ref 1–3)
CLOT LYSIS 30M P MA LENFR BLD TEG: 0 % (ref 0–8)
CLOT LYSIS 30M P MA LENFR BLD TEG: 0.1 % (ref 0–8)
CO2 BLDA-SCNC: 32 MMOL/L (ref 20–33)
CO2 SERPL-SCNC: 28 MMOL/L (ref 20–33)
COMPONENT F 8504F: NORMAL
CREAT SERPL-MCNC: 0.53 MG/DL (ref 0.5–1.4)
CT.EXTRINSIC BLD ROTEM: 1 MIN (ref 1–3)
DELSYS IDSYS: ABNORMAL
END TIDAL CARBON DIOXIDE IECO2: 36 MMHG
EOSINOPHIL # BLD AUTO: 0 K/UL (ref 0–0.51)
EOSINOPHIL NFR BLD: 0 % (ref 0–6.9)
ERYTHROCYTE [DISTWIDTH] IN BLOOD BY AUTOMATED COUNT: 52.8 FL (ref 35.9–50)
ERYTHROCYTE [DISTWIDTH] IN BLOOD BY AUTOMATED COUNT: 53.1 FL (ref 35.9–50)
GLUCOSE SERPL-MCNC: 117 MG/DL (ref 65–99)
HCO3 BLDA-SCNC: 30.5 MMOL/L (ref 17–25)
HCT VFR BLD AUTO: 20.2 % (ref 37–47)
HCT VFR BLD AUTO: 21.6 % (ref 37–47)
HGB BLD-MCNC: 6.8 G/DL (ref 12–16)
HGB BLD-MCNC: 6.8 G/DL (ref 12–16)
HGB BLD-MCNC: 7.4 G/DL (ref 12–16)
HOROWITZ INDEX BLDA+IHG-RTO: 210 MM[HG]
IMM GRANULOCYTES # BLD AUTO: 0.78 K/UL (ref 0–0.11)
IMM GRANULOCYTES NFR BLD AUTO: 3.6 % (ref 0–0.9)
LYMPHOCYTES # BLD AUTO: 1.24 K/UL (ref 1–4.8)
LYMPHOCYTES NFR BLD: 5.8 % (ref 22–41)
MCF BLD TEG: 83.2 MM (ref 50–70)
MCF P HPASE BLD TEG: 82 MM (ref 50–70)
MCH RBC QN AUTO: 29.1 PG (ref 27–33)
MCH RBC QN AUTO: 29.6 PG (ref 27–33)
MCHC RBC AUTO-ENTMCNC: 33.7 G/DL (ref 33.6–35)
MCHC RBC AUTO-ENTMCNC: 34.3 G/DL (ref 33.6–35)
MCV RBC AUTO: 86.3 FL (ref 81.4–97.8)
MCV RBC AUTO: 86.4 FL (ref 81.4–97.8)
MODE IMODE: ABNORMAL
MONOCYTES # BLD AUTO: 1.67 K/UL (ref 0–0.85)
MONOCYTES NFR BLD AUTO: 7.8 % (ref 0–13.4)
NEUTROPHILS # BLD AUTO: 17.76 K/UL (ref 2–7.15)
NEUTROPHILS NFR BLD: 82.5 % (ref 44–72)
NRBC # BLD AUTO: 0.03 K/UL
NRBC BLD-RTO: 0.1 /100 WBC
O2/TOTAL GAS SETTING VFR VENT: 30 %
PCO2 BLDA: 36.2 MMHG (ref 26–37)
PCO2 TEMP ADJ BLDA: 38.6 MMHG (ref 26–37)
PEEP END EXPIRATORY PRESSURE IPEEP: 8 CMH20
PH BLDA: 7.53 [PH] (ref 7.4–7.5)
PH TEMP ADJ BLDA: 7.51 [PH] (ref 7.4–7.5)
PLATELET # BLD AUTO: 525 K/UL (ref 164–446)
PLATELET # BLD AUTO: 529 K/UL (ref 164–446)
PMV BLD AUTO: 9 FL (ref 9–12.9)
PMV BLD AUTO: 9.2 FL (ref 9–12.9)
PO2 BLDA: 63 MMHG (ref 64–87)
PO2 TEMP ADJ BLDA: 70 MMHG (ref 64–87)
POTASSIUM SERPL-SCNC: 3.4 MMOL/L (ref 3.6–5.5)
POTASSIUM SERPL-SCNC: 3.5 MMOL/L (ref 3.6–5.5)
PRODUCT TYPE UPROD: NORMAL
RBC # BLD AUTO: 2.34 M/UL (ref 4.2–5.4)
RBC # BLD AUTO: 2.5 M/UL (ref 4.2–5.4)
SAO2 % BLDA: 94 % (ref 93–99)
SIGNIFICANT IND 70042: NORMAL
SIGNIFICANT IND 70042: NORMAL
SITE SITE: NORMAL
SITE SITE: NORMAL
SODIUM SERPL-SCNC: 133 MMOL/L (ref 135–145)
SOURCE SOURCE: NORMAL
SOURCE SOURCE: NORMAL
SPECIMEN DRAWN FROM PATIENT: ABNORMAL
TEG ALGORITHM TGALG: ABNORMAL
TIDAL VOLUME IVT: 320 ML
UNIT STATUS USTAT: NORMAL
WBC # BLD AUTO: 20.3 K/UL (ref 4.8–10.8)
WBC # BLD AUTO: 21.5 K/UL (ref 4.8–10.8)

## 2021-04-22 PROCEDURE — 37799 UNLISTED PX VASCULAR SURGERY: CPT

## 2021-04-22 PROCEDURE — 700105 HCHG RX REV CODE 258: Performed by: INTERNAL MEDICINE

## 2021-04-22 PROCEDURE — 84132 ASSAY OF SERUM POTASSIUM: CPT

## 2021-04-22 PROCEDURE — 700102 HCHG RX REV CODE 250 W/ 637 OVERRIDE(OP): Performed by: INTERNAL MEDICINE

## 2021-04-22 PROCEDURE — A9270 NON-COVERED ITEM OR SERVICE: HCPCS | Performed by: INTERNAL MEDICINE

## 2021-04-22 PROCEDURE — 36430 TRANSFUSION BLD/BLD COMPNT: CPT

## 2021-04-22 PROCEDURE — 85018 HEMOGLOBIN: CPT

## 2021-04-22 PROCEDURE — 94003 VENT MGMT INPAT SUBQ DAY: CPT

## 2021-04-22 PROCEDURE — 85027 COMPLETE CBC AUTOMATED: CPT

## 2021-04-22 PROCEDURE — 700111 HCHG RX REV CODE 636 W/ 250 OVERRIDE (IP): Performed by: INTERNAL MEDICINE

## 2021-04-22 PROCEDURE — 700101 HCHG RX REV CODE 250: Performed by: INTERNAL MEDICINE

## 2021-04-22 PROCEDURE — 82803 BLOOD GASES ANY COMBINATION: CPT

## 2021-04-22 PROCEDURE — 86923 COMPATIBILITY TEST ELECTRIC: CPT

## 2021-04-22 PROCEDURE — P9017 PLASMA 1 DONOR FRZ W/IN 8 HR: HCPCS

## 2021-04-22 PROCEDURE — 85347 COAGULATION TIME ACTIVATED: CPT | Mod: 91

## 2021-04-22 PROCEDURE — 99291 CRITICAL CARE FIRST HOUR: CPT | Performed by: INTERNAL MEDICINE

## 2021-04-22 PROCEDURE — 80048 BASIC METABOLIC PNL TOTAL CA: CPT

## 2021-04-22 PROCEDURE — P9016 RBC LEUKOCYTES REDUCED: HCPCS

## 2021-04-22 PROCEDURE — 770022 HCHG ROOM/CARE - ICU (200)

## 2021-04-22 PROCEDURE — 85576 BLOOD PLATELET AGGREGATION: CPT

## 2021-04-22 PROCEDURE — 71045 X-RAY EXAM CHEST 1 VIEW: CPT

## 2021-04-22 PROCEDURE — 85384 FIBRINOGEN ACTIVITY: CPT

## 2021-04-22 PROCEDURE — 85025 COMPLETE CBC W/AUTO DIFF WBC: CPT

## 2021-04-22 PROCEDURE — 94799 UNLISTED PULMONARY SVC/PX: CPT

## 2021-04-22 RX ORDER — POTASSIUM CHLORIDE 29.8 MG/ML
40 INJECTION INTRAVENOUS ONCE
Status: COMPLETED | OUTPATIENT
Start: 2021-04-22 | End: 2021-04-22

## 2021-04-22 RX ADMIN — Medication 250 MCG/HR: at 11:11

## 2021-04-22 RX ADMIN — RISPERIDONE 2 MG: 1 TABLET, FILM COATED ORAL at 17:19

## 2021-04-22 RX ADMIN — DIVALPROEX SODIUM 500 MG: 125 CAPSULE ORAL at 09:08

## 2021-04-22 RX ADMIN — PIPERACILLIN AND TAZOBACTAM 4.5 G: 4; .5 INJECTION, POWDER, LYOPHILIZED, FOR SOLUTION INTRAVENOUS; PARENTERAL at 15:45

## 2021-04-22 RX ADMIN — LORAZEPAM 2 MG: 2 INJECTION INTRAMUSCULAR; INTRAVENOUS at 12:16

## 2021-04-22 RX ADMIN — NAFCILLIN SODIUM 2 G: 2 INJECTION, POWDER, FOR SOLUTION INTRAMUSCULAR; INTRAVENOUS at 13:41

## 2021-04-22 RX ADMIN — DEXMEDETOMIDINE 1.5 MCG/KG/HR: 200 INJECTION, SOLUTION INTRAVENOUS at 12:02

## 2021-04-22 RX ADMIN — DOCUSATE SODIUM 50 MG AND SENNOSIDES 8.6 MG 2 TABLET: 8.6; 5 TABLET, FILM COATED ORAL at 09:05

## 2021-04-22 RX ADMIN — DULOXETINE HYDROCHLORIDE 60 MG: 60 CAPSULE, DELAYED RELEASE ORAL at 17:18

## 2021-04-22 RX ADMIN — METHYLPREDNISOLONE SODIUM SUCCINATE 40 MG: 40 INJECTION, POWDER, FOR SOLUTION INTRAMUSCULAR; INTRAVENOUS at 17:18

## 2021-04-22 RX ADMIN — RISPERIDONE 2 MG: 1 TABLET, FILM COATED ORAL at 09:04

## 2021-04-22 RX ADMIN — DULOXETINE HYDROCHLORIDE 60 MG: 60 CAPSULE, DELAYED RELEASE ORAL at 09:05

## 2021-04-22 RX ADMIN — NAFCILLIN SODIUM 2 G: 2 INJECTION, POWDER, FOR SOLUTION INTRAMUSCULAR; INTRAVENOUS at 11:10

## 2021-04-22 RX ADMIN — ACETAMINOPHEN 650 MG: 325 TABLET, FILM COATED ORAL at 09:04

## 2021-04-22 RX ADMIN — DOCUSATE SODIUM 50 MG AND SENNOSIDES 8.6 MG 2 TABLET: 8.6; 5 TABLET, FILM COATED ORAL at 17:19

## 2021-04-22 RX ADMIN — FAMOTIDINE 20 MG: 10 INJECTION INTRAVENOUS at 05:16

## 2021-04-22 RX ADMIN — LORAZEPAM 2 MG: 2 INJECTION INTRAMUSCULAR; INTRAVENOUS at 06:57

## 2021-04-22 RX ADMIN — DEXMEDETOMIDINE 1.5 MCG/KG/HR: 200 INJECTION, SOLUTION INTRAVENOUS at 04:52

## 2021-04-22 RX ADMIN — METHYLPREDNISOLONE SODIUM SUCCINATE 40 MG: 40 INJECTION, POWDER, FOR SOLUTION INTRAMUSCULAR; INTRAVENOUS at 11:10

## 2021-04-22 RX ADMIN — PIPERACILLIN AND TAZOBACTAM 4.5 G: 4; .5 INJECTION, POWDER, LYOPHILIZED, FOR SOLUTION INTRAVENOUS; PARENTERAL at 21:03

## 2021-04-22 RX ADMIN — LORAZEPAM 2 MG: 2 INJECTION INTRAMUSCULAR; INTRAVENOUS at 14:55

## 2021-04-22 RX ADMIN — PIPERACILLIN AND TAZOBACTAM 4.5 G: 4; .5 INJECTION, POWDER, LYOPHILIZED, FOR SOLUTION INTRAVENOUS; PARENTERAL at 14:58

## 2021-04-22 RX ADMIN — FUROSEMIDE 40 MG: 10 INJECTION, SOLUTION INTRAMUSCULAR; INTRAVENOUS at 05:16

## 2021-04-22 RX ADMIN — DEXMEDETOMIDINE 1.5 MCG/KG/HR: 200 INJECTION, SOLUTION INTRAVENOUS at 01:34

## 2021-04-22 RX ADMIN — POTASSIUM CHLORIDE 40 MEQ: 29.8 INJECTION, SOLUTION INTRAVENOUS at 08:26

## 2021-04-22 RX ADMIN — NOREPINEPHRINE BITARTRATE 5 MCG/MIN: 1 INJECTION, SOLUTION, CONCENTRATE INTRAVENOUS at 12:02

## 2021-04-22 RX ADMIN — POTASSIUM CHLORIDE 40 MEQ: 29.8 INJECTION, SOLUTION INTRAVENOUS at 13:41

## 2021-04-22 RX ADMIN — DIVALPROEX SODIUM 500 MG: 125 CAPSULE ORAL at 21:46

## 2021-04-22 RX ADMIN — METHYLPREDNISOLONE SODIUM SUCCINATE 40 MG: 40 INJECTION, POWDER, FOR SOLUTION INTRAMUSCULAR; INTRAVENOUS at 05:16

## 2021-04-22 RX ADMIN — Medication 2500 MCG: at 03:13

## 2021-04-22 RX ADMIN — Medication 350 MCG/HR: at 19:10

## 2021-04-22 RX ADMIN — DEXMEDETOMIDINE 1.5 MCG/KG/HR: 200 INJECTION, SOLUTION INTRAVENOUS at 08:22

## 2021-04-22 RX ADMIN — DIVALPROEX SODIUM 500 MG: 125 CAPSULE ORAL at 13:40

## 2021-04-22 RX ADMIN — METHYLPREDNISOLONE SODIUM SUCCINATE 40 MG: 40 INJECTION, POWDER, FOR SOLUTION INTRAMUSCULAR; INTRAVENOUS at 23:46

## 2021-04-22 RX ADMIN — PROPOFOL 15 MCG/KG/MIN: 10 INJECTION, EMULSION INTRAVENOUS at 15:36

## 2021-04-22 RX ADMIN — NAFCILLIN SODIUM 2 G: 2 INJECTION, POWDER, FOR SOLUTION INTRAMUSCULAR; INTRAVENOUS at 06:01

## 2021-04-22 RX ADMIN — PROPOFOL 25 MCG/KG/MIN: 10 INJECTION, EMULSION INTRAVENOUS at 22:53

## 2021-04-22 RX ADMIN — NAFCILLIN SODIUM 2 G: 2 INJECTION, POWDER, FOR SOLUTION INTRAMUSCULAR; INTRAVENOUS at 03:12

## 2021-04-22 RX ADMIN — FAMOTIDINE 20 MG: 20 TABLET ORAL at 17:18

## 2021-04-22 ASSESSMENT — PAIN DESCRIPTION - PAIN TYPE
TYPE: ACUTE PAIN
TYPE: ACUTE PAIN

## 2021-04-22 NOTE — PROGRESS NOTES
"    DATE: 4/22/2021    Hospital Day 6 Right empyema / septic shock /MSSA bacteremia /infective endocarditis.    Interval Events:    No significant clinical improvement  Remains on levophed infusion  Transfused 1 unit PRBC   CXR and CT chest reviewed  For CT guided drainage of left pleural fluid today    PHYSICAL EXAMINATION:  Constitutional:     Vital Signs: /48   Pulse 88   Temp 37.9 °C (100.2 °F) (Bladder)   Resp (!) 26   Ht 1.6 m (5' 2.99\")   Wt 82.3 kg (181 lb 7 oz)   SpO2 96%        General Appearance: The patient is a critically ill-appearing and Intubated and sedated woman in mild distress.  HEENT:               The pupils are equal, round, and reactive to light bilaterally The nares and oropharynx are clear. The trachea is midline. No jugulovenous distension.  Respiratory:              Inspection: Unlabored respirations, no intercostal retractions, paradoxical motion, or accessory muscle use.   Right chest tube #1 -130 cc bloody fluid /no air leak present              Palpation:  The chest is nontender.               Auscultation: normal.  Cardiovascular:              Inspection: The skin is warm, dry and well purfused.  Auscultation: Regular rate and rhythm.              Peripheral Pulses: Normal.   Abdomen:              Inspection: Abdominal inspection reveals no abrasions, contusions, lacerations or penetrating wounds.              Palpation: Palpation is remarkable for no significant tenderness, guarding, or peritoneal findings.      Laboratory Values:   Recent Labs     04/21/21  0320 04/21/21  1500 04/22/21  0455 04/22/21  0849 04/22/21  1146   WBC 32.2*  --  21.5*  --  20.3*   RBC 2.62*  --  2.50*  --  2.34*   HEMOGLOBIN 7.8*   < > 7.4* 6.8* 6.8*   HEMATOCRIT 22.0*  --  21.6*  --  20.2*   MCV 84.0  --  86.4  --  86.3   MCH 29.8  --  29.6  --  29.1   MCHC 35.5*  --  34.3  --  33.7   RDW 51.6*  --  53.1*  --  52.8*   PLATELETCT 476*  --  529*  --  525*   MPV 9.3  --  9.2  --  9.0    < > = " values in this interval not displayed.     Recent Labs     04/20/21  0300 04/20/21  0300 04/21/21  0320 04/22/21  0455 04/22/21  1146   SODIUM 128*  --  127* 133*  --    POTASSIUM 3.5*   < > 3.9 3.4* 3.5*   CHLORIDE 93*  --  92* 97  --    CO2 27  --  28 28  --    GLUCOSE 107*  --  119* 117*  --    BUN 14  -- 21 20  --    CREATININE 0.59  --  0.82 0.53  --    CALCIUM 6.8*  --  7.2* 7.3*  --     < > = values in this interval not displayed.     Recent Labs     04/21/21  0320   INR 1.21*     Recent Labs     04/21/21 0320   INR 1.21*        Imaging:   DX-CHEST-LIMITED (1 VIEW)   Final Result      1.  Large right hydropneumothorax with right-sided chest tube in place, likely stable to slightly decreased from prior study.   2.  Small left pleural effusion. Mild improved aeration in the left lung.   3.  Bilateral pulmonary opacities which could be related to combination of atelectasis, edema and/or infection..      CT-CTA HEAD WITH & W/O-POST PROCESS   Final Result      1.  Patent carotid and vertebral arteries.      2.  Again seen right frontal the jugular peritoneal shunt catheter. There is mild increase in volume of the lateral and third ventricles.      CT-CHEST (THORAX) W/O   Final Result      1.  Interval placement of a right-sided chest tube into a large loculated right-sided pleural effusion. There is now seen a large right-sided hydropneumothorax in the area that previously seen fluid. The chest tube extends into that hydropneumothorax.    There is some residual pleural fluid seen as well. A hydropneumothorax is somewhat loculated with components most prominently inferiorly and medially.      2.  New loculated left pleural effusion.      3.  Again seen multiple bilateral cavitary pulmonary masses which are more prominent than previously seen with increasing cavitation and measure up to 3 cm size. Differential diagnosis includes septic emboli, multifocal abscesses and neoplasm.      4.  Large amount of  subcutaneous emphysema on the right.      5.  Splenomegaly.      6.  Multiple support devices.      Fleischner Society pulmonary nodule recommendations:         DX-CHEST-LIMITED (1 VIEW)   Final Result      1.  Support devices as described above.      2.  Right chest tube present with a again seen right-sided pneumothorax, possibly increased in size and loculated.      3.  Worsening bilateral pulmonary infiltrates.      DX-ABDOMEN FOR TUBE PLACEMENT   Final Result      Cortrak feeding tube tip projects in the region of the duodenal bulb.      DX-CHEST-LIMITED (1 VIEW)   Final Result      Loculated right pneumothorax is decreased in size compared to prior.      Small left pleural effusion.      Small loculated right pleural effusion.      Extensive bilateral airspace opacities are not significantly changed.      Soft tissue air on the right is again noted.      DX-CHEST-LIMITED (1 VIEW)   Final Result      Decreased partially loculated right pleural fluid with increased pleural air. Right chest tube is in place.      Increased hazy opacity in the left lung possibly atelectasis.         US-ABDOMEN LTD (SOFT TISSUE)   Final Result      No drainable fluid collection.      DX-CHEST-PORTABLE (1 VIEW)   Final Result      Decreased partially loculated right pleural fluid with increased pleural air. Right chest tube is in place.      No other significant change.      DX-CHEST-PORTABLE (1 VIEW)   Final Result         1.  Pulmonary edema and/or infiltrates are identified, which are somewhat decreased since the prior exam.   2.  Moderate loculated appearing right pleural effusion, slightly decreased since prior      DX-ABDOMEN FOR TUBE PLACEMENT   Final Result      Feeding tube tip at the distal stomach.      DX-CHEST-PORTABLE (1 VIEW)   Final Result         1.  New chest tube is noted in the right lower hemithorax.      2.  Right pleural effusion is again identified which appears similar to the prior exam.      3.  No new  infiltrates or consolidations are identified.      DX-CHEST-PORTABLE (1 VIEW)   Final Result         1.  Pulmonary edema and/or infiltrates are identified, which are stable since the prior exam.   2.  Moderate loculated appearing right pleural effusion      DX-CHEST-PORTABLE (1 VIEW)   Final Result         No significant interval change.      IR-CONSULT AND TREAT    (Results Pending)   EC-MICAH W/ CONT    (Results Pending)   US-CYST ASPIRATION-MISC    (Results Pending)       ASSESSMENT AND PLAN:     Empyema of right pleural space (HCC)  Assessment & Plan  4/17 Upsized pigtail to 36 Vietnamese chest tube, serosanguineous fluid removed no active bleeding noted.  We will continue with drainage with appropriate sized chest tube and reassess in a.m.    4/20 Day #3 intrapleural fibrinolytic      DISPOSITION: Continue nonoperative management and close clinical observation.   No further intrapleural fibrinolytics  Poor surgical candidate  Discussed with Dr Burgos        ____________________________________     Dillan Contreras M.D.    DD: 4/22/2021  4:53 PM

## 2021-04-22 NOTE — CARE PLAN
Problem: Safety  Goal: Will remain free from injury  Outcome: PROGRESSING AS EXPECTED  Goal: Will remain free from falls  Outcome: PROGRESSING AS EXPECTED     Problem: Safety - Medical Restraint  Goal: Remains free of injury from restraints (Restraint for Interference with Medical Device)  Description: INTERVENTIONS:  1. Determine that other, less restrictive measures have been tried or would not be effective before applying the restraint  2. Evaluate the patient's condition at the time of restraint application  3. Inform patient/family regarding the reason for restraint  4. Q2H: Monitor safety, psychosocial status, comfort, nutrition and hydration  Outcome: PROGRESSING AS EXPECTED     Problem: Communication  Goal: The ability to communicate needs accurately and effectively will improve  Outcome: PROGRESSING SLOWER THAN EXPECTED     Problem: Safety - Medical Restraint  Goal: Free from restraint(s) (Restraint for Interference with Medical Device)  Description: INTERVENTIONS:  1. ONCE/SHIFT or MINIMUM Q12H: Assess and document the continuing need for restraints  2. Q24H: Continued use of restraint requires LIP to perform face to face examination and written order  3. Identify and implement measures to help patient regain control  Outcome: PROGRESSING SLOWER THAN EXPECTED

## 2021-04-22 NOTE — PROGRESS NOTES
Dr. Burgos notified at 1650 of hgb 6.7. Also notified that patient is still coughing, gagging, and hypertensive with precedex 1.5, fentanyl 400 and inability to wean off propofol. MD increased fentanyl max to 600 mcg/hr.

## 2021-04-22 NOTE — PROGRESS NOTES
Spoke with Dr. Burgos regarding patient's tachypnea, increasing Fentanyl gtt and more frequent IV Ativan PRN doses. Orders to switch from Dex to Propofol.

## 2021-04-22 NOTE — PROGRESS NOTES
Infectious Disease Daily Progress Note    Date of Service  4/22/2021    Chief Complaint  Nafcillin 2g Q4 hrs 4/15-present    Previous: Unasyn, Zosyn, Vanco, Daptomycin  Ceftaroline: start 4/13-4/15    4/14: Unable to give name of previous NSG or neurologist. Seems confused, but able to say some sentences fluently.   4/15: Line cultures now growing MSSA. As well as from the blood. Repeat blood culture 4/13+ in both sets. Patient has worsening confusion. CSF fluid analyses suggest pleocytosis. Cultures are no growth from the CSF. Chest CT was ordered this morning indicating a loculated right pleural effusion as well as bilateral septic emboli. Dr. Mack spoke with Dr. Oh yesterday and no surgical intervention unless clinical deterioration.  4/16: Increased respiratory distress.  Tachypneic.  CT with loculated collection.  Dr Resendiz not available.  No thoracic surgeon available.  Plans to have pulmonary place CT.  Transferring to ICU.  4/17: Transferred to [facility] last night. Hgb dropped to 6.4 requiring PRBC transfusion. Requiring 2 pressors. Fio2 50%. Febrile 38.8. Pending OR decortication of empyema and hemothorax. Chest tube placed at [facility] shows 8,371 WBC, ,000, 74% N  4/18: Chest tube was upsized by surgery yesterday, no decortication. WBC 22k. Fio2 40%. Vasopressin. Levophed down to 2mcg. Afebrile 24 hrs. Last fever 38.6 on 4/16.   4/19: Still on vent. Levo 3 mcg, vasopressin. Afebrile 72 hours. WBC 21k. BC 4/17 NGTD x 48 hours. Unable to do MRI brain given neurostimulator per RN.   4/20: Remaining afebrile. Hb 6.2 and undergoing transfusion today.WBC 24,100, 5% bands.1700 ml CT output. Copious bloody ET secretions. No pressors. Receiving dornase and TPA per CT. Right pleural effusion not large per CXR today.   4/21: WBC 31k. Bronchoscopy yesterday showing blood. Cultures sent. TPA on hold per RN due to arvind blood from chest tube requiring PRBC transfusion for hgb 6. Pending chest CT today. Per  , spinal stimulator is non-MRI compatible. Levophed 10mcg. 30% Fio2. Afebrile.   4/22: Fever 101.3 this am. WBC 20,300 down from 32,200 yesterday. BAL growing Klebsiella pneumoniae but susceptibility panel not set up until today. CTA of brain shows no lesion. CT of chest shows enlarging cavitary lung lesions bilat and large loculated new left pleural effusion and large hydropneumothorax on right. TPA & dornase infusions of right chest ended 4/20 after considerable bleeding requiring transfusion. Hb now down again to 6.8.    Review of Systems  Review of Systems   Unable to perform ROS: Intubated   Constitutional: Fever:        Physical Exam  Temp:  [37.6 °C (99.7 °F)-37.9 °C (100.2 °F)] 37.9 °C (100.2 °F)  Pulse:  [] 86  Resp:  [16-26] 21  BP: ()/(37-73) 94/37  SpO2:  [92 %-100 %] 95 %    Physical Exam  Constitutional:       Comments: Intubated sedated . Exam unchanged  HENT:      Head: Normocephalic.      Comments: ET tube  Cardiovascular:      Rate and Rhythm: Normal rate and regular rhythm.      Heart sounds: No murmur.   Pulmonary:      Comments: Diminished R side with rales. R chest tube with arvind blood.   Abdominal:      General: Abdomen is flat. Bowel sounds are normal. There is no distension.   Musculoskeletal:         General: No swelling or deformity.   Skin:     Comments: R chest tube. Stimulator palpable, non induratated      Laboratory  Recent Labs     04/21/21  0320 04/21/21  1500 04/22/21  0455 04/22/21  0849 04/22/21  1146   WBC 32.2*  --  21.5*  --  20.3*   RBC 2.62*  --  2.50*  --  2.34*   HEMOGLOBIN 7.8*   < > 7.4* 6.8* 6.8*   HEMATOCRIT 22.0*  --  21.6*  --  20.2*   MCV 84.0  --  86.4  --  86.3   MCH 29.8  --  29.6  --  29.1   MCHC 35.5*  --  34.3  --  33.7   RDW 51.6*  --  53.1*  --  52.8*   PLATELETCT 476*  --  529*  --  525*   MPV 9.3  --  9.2  --  9.0    < > = values in this interval not displayed.     Recent Labs     04/20/21  0300 04/20/21  0300 04/21/21  0320  04/22/21  0455 04/22/21  1146   SODIUM 128*  --  127* 133*  --    POTASSIUM 3.5*   < > 3.9 3.4* 3.5*   CHLORIDE 93*  --  92* 97  --    CO2 27  --  28 28  --    GLUCOSE 107*  --  119* 117*  --    BUN 14  --  21 20  --    CREATININE 0.59  --  0.82 0.53  --    CALCIUM 6.8*  --  7.2* 7.3*  --     < > = values in this interval not displayed.     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   -Acute septic emboli bilaterally  -Acute bilateral pneumonia due to above     --Empyema on right presumed due to MSSA but Klebsiella may be involved too; new empyema on left   -Pseudotumor cerebri with underlying  shunt  -Chronic migraine headaches  -History of autoimmune vasculitis  -Elevated alkaline phosphatase & bilirubin  -Acute encephalopathy due to sepsis staphylococcal, meningitis ruled out      - hemothorax R      - anemia due to above   - Klebsiella pneumoniae pneumonia (presumed VAP); lung abscess probably due to MSSA but Klebsiella may be playing a role too.      Plan   Patient has evidence of disseminated staphylococcal sepsis source from her line with infection to her lungs, tricuspid valve and  R empyema requiring transfer to [facility] for decortication due to lack of CT surgery support.  However not a surgical candidate after all.   - Will substitute piperacillin-tazobactam for nafcillin temporarily while we await susceptibility of the Klebsiella pneumoniae.  If sensitive, may be able to use cefuroxime.    - CT to be placed on left in IR today.   -- Blood cultures negative on 4/17 but will repeat now with continue fever & leukocytosis.  --US spinal stimulator pocket for fluid to aspirate  -  Unfortunately has a stimulator  that is non compatible with MRI. Will check US of stimulator to assess for fluid collection.  - US soft tissue to rule out stimulator abscess  - pending repeat Chest CT per primary team  - initial CSF cultures has pleocytosis, but no culture growth  - chest tube upsized by surgery 4/17.   - thoracentesis and pleural fluid consistent with empyema and hemothorax  - blood culture from 4/14 at [facility] positive.Admission blood cultures here on 4/16 both positive.  4/17 blood cultures NGTD  - Central line now has been changed.   - Continue to follow CSF 4/13 negative and pleural cultures 4/16 negative. CSF findings on 4/13:  63 wbc, 82% PMN, protein 97.  - original infected port removed 4/12    - vent management per pulmonology  Condition remains critically ill     45 min spent with 50% face to face care and critical care time involvement  Thank you for this consult. Case discussed with CAROLINA

## 2021-04-22 NOTE — CARE PLAN
Ventilator Daily Summary     Vent Day: 6  ETT 7.5@23     APVCMV  20, 320, +8, 30%      Plan: Continue current ventilator settings and wean mechanical ventilation as tolerated per physician orders.

## 2021-04-22 NOTE — CARE PLAN
Problem: Safety  Goal: Will remain free from injury  Outcome: PROGRESSING AS EXPECTED  Goal: Will remain free from falls  Outcome: PROGRESSING AS EXPECTED     Problem: Safety - Medical Restraint  Goal: Remains free of injury from restraints (Restraint for Interference with Medical Device)  Description: INTERVENTIONS:  1. Determine that other, less restrictive measures have been tried or would not be effective before applying the restraint  2. Evaluate the patient's condition at the time of restraint application  3. Inform patient/family regarding the reason for restraint  4. Q2H: Monitor safety, psychosocial status, comfort, nutrition and hydration  Outcome: PROGRESSING AS EXPECTED  Goal: Free from restraint(s) (Restraint for Interference with Medical Device)  Description: INTERVENTIONS:  1. ONCE/SHIFT or MINIMUM Q12H: Assess and document the continuing need for restraints  2. Q24H: Continued use of restraint requires LIP to perform face to face examination and written order  3. Identify and implement measures to help patient regain control  Outcome: PROGRESSING AS EXPECTED     Problem: Pain Management  Goal: Pain level will decrease to patient's comfort goal  Outcome: PROGRESSING AS EXPECTED

## 2021-04-22 NOTE — PROGRESS NOTES
0940: Updated Dr. Burgos on Hgb of 6.8 from 7.4. Orders to repeat Hgb at 1200 and TEG.    1250: New Hgb 6.8. No new orders received. Potassium 3.5 from 3.4 after 40 mEqs. Orders for additional 40 mEq received.

## 2021-04-22 NOTE — ASSESSMENT & PLAN NOTE
Empyema, loculated  New on repeat ct imaging  IR guided chest tube on 4/23  TPA/dornase started on 4/24

## 2021-04-23 ENCOUNTER — APPOINTMENT (OUTPATIENT)
Dept: RADIOLOGY | Facility: MEDICAL CENTER | Age: 51
DRG: 003 | End: 2021-04-23
Attending: INTERNAL MEDICINE
Payer: MEDICARE

## 2021-04-23 ENCOUNTER — APPOINTMENT (OUTPATIENT)
Dept: CARDIOLOGY | Facility: MEDICAL CENTER | Age: 51
DRG: 003 | End: 2021-04-23
Attending: INTERNAL MEDICINE
Payer: MEDICARE

## 2021-04-23 ENCOUNTER — APPOINTMENT (OUTPATIENT)
Dept: RADIOLOGY | Facility: MEDICAL CENTER | Age: 51
DRG: 003 | End: 2021-04-23
Attending: RADIOLOGY
Payer: MEDICARE

## 2021-04-23 LAB
ANION GAP SERPL CALC-SCNC: 7 MMOL/L (ref 7–16)
APPEARANCE FLD: NORMAL
BACTERIA SPEC RESP CULT: ABNORMAL
BACTERIA SPEC RESP CULT: ABNORMAL
BASOPHILS # BLD AUTO: 0.6 % (ref 0–1.8)
BASOPHILS # BLD: 0.12 K/UL (ref 0–0.12)
BODY FLD TYPE: NORMAL
BUN SERPL-MCNC: 17 MG/DL (ref 8–22)
CALCIUM SERPL-MCNC: 7.7 MG/DL (ref 8.5–10.5)
CHLORIDE SERPL-SCNC: 100 MMOL/L (ref 96–112)
CO2 SERPL-SCNC: 27 MMOL/L (ref 20–33)
COLOR FLD: NORMAL
CREAT SERPL-MCNC: 0.47 MG/DL (ref 0.5–1.4)
CSF COMMENTS 1658: NORMAL
EKG IMPRESSION: NORMAL
EOSINOPHIL # BLD AUTO: 0 K/UL (ref 0–0.51)
EOSINOPHIL NFR BLD: 0 % (ref 0–6.9)
ERYTHROCYTE [DISTWIDTH] IN BLOOD BY AUTOMATED COUNT: 53.3 FL (ref 35.9–50)
GLUCOSE SERPL-MCNC: 85 MG/DL (ref 65–99)
GRAM STN SPEC: ABNORMAL
GRAM STN SPEC: NORMAL
HCT VFR BLD AUTO: 21 % (ref 37–47)
HGB BLD-MCNC: 7.1 G/DL (ref 12–16)
HGB BLD-MCNC: 7.5 G/DL (ref 12–16)
IMM GRANULOCYTES # BLD AUTO: 0.36 K/UL (ref 0–0.11)
IMM GRANULOCYTES NFR BLD AUTO: 1.7 % (ref 0–0.9)
LYMPHOCYTES # BLD AUTO: 1.26 K/UL (ref 1–4.8)
LYMPHOCYTES NFR BLD: 5.9 % (ref 22–41)
LYMPHOCYTES NFR FLD: 13 %
MCH RBC QN AUTO: 30.6 PG (ref 27–33)
MCHC RBC AUTO-ENTMCNC: 33.8 G/DL (ref 33.6–35)
MCV RBC AUTO: 90.5 FL (ref 81.4–97.8)
MONOCYTES # BLD AUTO: 1.87 K/UL (ref 0–0.85)
MONOCYTES NFR BLD AUTO: 8.8 % (ref 0–13.4)
MONONUC CELLS NFR FLD: 4 %
NEUTROPHILS # BLD AUTO: 17.74 K/UL (ref 2–7.15)
NEUTROPHILS NFR BLD: 83 % (ref 44–72)
NEUTROPHILS NFR FLD: 83 %
NRBC # BLD AUTO: 0.02 K/UL
NRBC BLD-RTO: 0.1 /100 WBC
PLATELET # BLD AUTO: 507 K/UL (ref 164–446)
PMV BLD AUTO: 8.8 FL (ref 9–12.9)
POTASSIUM SERPL-SCNC: 3.3 MMOL/L (ref 3.6–5.5)
RBC # BLD AUTO: 2.32 M/UL (ref 4.2–5.4)
RBC # FLD: NORMAL CELLS/UL
SIGNIFICANT IND 70042: ABNORMAL
SIGNIFICANT IND 70042: NORMAL
SITE SITE: ABNORMAL
SITE SITE: NORMAL
SODIUM SERPL-SCNC: 134 MMOL/L (ref 135–145)
SOURCE SOURCE: ABNORMAL
SOURCE SOURCE: NORMAL
TRIGL SERPL-MCNC: 224 MG/DL (ref 0–149)
WBC # BLD AUTO: 21.4 K/UL (ref 4.8–10.8)
WBC # FLD: 6500 CELLS/UL

## 2021-04-23 PROCEDURE — 700111 HCHG RX REV CODE 636 W/ 250 OVERRIDE (IP): Performed by: INTERNAL MEDICINE

## 2021-04-23 PROCEDURE — 70450 CT HEAD/BRAIN W/O DYE: CPT | Mod: MG

## 2021-04-23 PROCEDURE — 76705 ECHO EXAM OF ABDOMEN: CPT

## 2021-04-23 PROCEDURE — 87205 SMEAR GRAM STAIN: CPT

## 2021-04-23 PROCEDURE — 99223 1ST HOSP IP/OBS HIGH 75: CPT | Mod: 25 | Performed by: INTERNAL MEDICINE

## 2021-04-23 PROCEDURE — 700105 HCHG RX REV CODE 258: Performed by: INTERNAL MEDICINE

## 2021-04-23 PROCEDURE — 95819 EEG AWAKE AND ASLEEP: CPT | Performed by: PSYCHIATRY & NEUROLOGY

## 2021-04-23 PROCEDURE — 80048 BASIC METABOLIC PNL TOTAL CA: CPT

## 2021-04-23 PROCEDURE — 4A00X4Z MEASUREMENT OF CENTRAL NERVOUS ELECTRICAL ACTIVITY, EXTERNAL APPROACH: ICD-10-PCS | Performed by: PSYCHIATRY & NEUROLOGY

## 2021-04-23 PROCEDURE — 95816 EEG AWAKE AND DROWSY: CPT | Mod: 26 | Performed by: PSYCHIATRY & NEUROLOGY

## 2021-04-23 PROCEDURE — 32551 INSERTION OF CHEST TUBE: CPT | Performed by: INTERNAL MEDICINE

## 2021-04-23 PROCEDURE — 93312 ECHO TRANSESOPHAGEAL: CPT | Mod: 26 | Performed by: INTERNAL MEDICINE

## 2021-04-23 PROCEDURE — 95816 EEG AWAKE AND DROWSY: CPT | Performed by: PSYCHIATRY & NEUROLOGY

## 2021-04-23 PROCEDURE — 85025 COMPLETE CBC W/AUTO DIFF WBC: CPT

## 2021-04-23 PROCEDURE — 700102 HCHG RX REV CODE 250 W/ 637 OVERRIDE(OP): Performed by: INTERNAL MEDICINE

## 2021-04-23 PROCEDURE — 770022 HCHG ROOM/CARE - ICU (200)

## 2021-04-23 PROCEDURE — 0W9B30Z DRAINAGE OF LEFT PLEURAL CAVITY WITH DRAINAGE DEVICE, PERCUTANEOUS APPROACH: ICD-10-PCS | Performed by: RADIOLOGY

## 2021-04-23 PROCEDURE — 99292 CRITICAL CARE ADDL 30 MIN: CPT | Performed by: INTERNAL MEDICINE

## 2021-04-23 PROCEDURE — 32557 INSERT CATH PLEURA W/ IMAGE: CPT | Mod: LT

## 2021-04-23 PROCEDURE — 93325 DOPPLER ECHO COLOR FLOW MAPG: CPT

## 2021-04-23 PROCEDURE — 85018 HEMOGLOBIN: CPT

## 2021-04-23 PROCEDURE — A9270 NON-COVERED ITEM OR SERVICE: HCPCS | Performed by: INTERNAL MEDICINE

## 2021-04-23 PROCEDURE — 89051 BODY FLUID CELL COUNT: CPT

## 2021-04-23 PROCEDURE — 94799 UNLISTED PULMONARY SVC/PX: CPT

## 2021-04-23 PROCEDURE — 71045 X-RAY EXAM CHEST 1 VIEW: CPT

## 2021-04-23 PROCEDURE — 93010 ELECTROCARDIOGRAM REPORT: CPT | Performed by: INTERNAL MEDICINE

## 2021-04-23 PROCEDURE — 84478 ASSAY OF TRIGLYCERIDES: CPT

## 2021-04-23 PROCEDURE — 99291 CRITICAL CARE FIRST HOUR: CPT | Performed by: INTERNAL MEDICINE

## 2021-04-23 PROCEDURE — 93005 ELECTROCARDIOGRAM TRACING: CPT | Performed by: INTERNAL MEDICINE

## 2021-04-23 PROCEDURE — 94003 VENT MGMT INPAT SUBQ DAY: CPT

## 2021-04-23 PROCEDURE — 87070 CULTURE OTHR SPECIMN AEROBIC: CPT

## 2021-04-23 PROCEDURE — B246ZZ4 ULTRASONOGRAPHY OF RIGHT AND LEFT HEART, TRANSESOPHAGEAL: ICD-10-PCS | Performed by: INTERNAL MEDICINE

## 2021-04-23 RX ADMIN — DOCUSATE SODIUM 50 MG AND SENNOSIDES 8.6 MG 2 TABLET: 8.6; 5 TABLET, FILM COATED ORAL at 05:31

## 2021-04-23 RX ADMIN — CEFEPIME 1 G: 1 INJECTION, POWDER, FOR SOLUTION INTRAMUSCULAR; INTRAVENOUS at 22:13

## 2021-04-23 RX ADMIN — PROPOFOL 25 MCG/KG/MIN: 10 INJECTION, EMULSION INTRAVENOUS at 06:32

## 2021-04-23 RX ADMIN — POTASSIUM BICARBONATE 25 MEQ: 978 TABLET, EFFERVESCENT ORAL at 10:26

## 2021-04-23 RX ADMIN — METHYLPREDNISOLONE SODIUM SUCCINATE 40 MG: 40 INJECTION, POWDER, FOR SOLUTION INTRAMUSCULAR; INTRAVENOUS at 05:25

## 2021-04-23 RX ADMIN — CEFEPIME 1 G: 1 INJECTION, POWDER, FOR SOLUTION INTRAMUSCULAR; INTRAVENOUS at 13:56

## 2021-04-23 RX ADMIN — POTASSIUM BICARBONATE 25 MEQ: 978 TABLET, EFFERVESCENT ORAL at 13:58

## 2021-04-23 RX ADMIN — Medication 150 MCG/HR: at 16:45

## 2021-04-23 RX ADMIN — RISPERIDONE 2 MG: 1 TABLET, FILM COATED ORAL at 18:01

## 2021-04-23 RX ADMIN — FENTANYL CITRATE 100 MCG: 50 INJECTION, SOLUTION INTRAMUSCULAR; INTRAVENOUS at 13:53

## 2021-04-23 RX ADMIN — METHYLPREDNISOLONE SODIUM SUCCINATE 40 MG: 40 INJECTION, POWDER, FOR SOLUTION INTRAMUSCULAR; INTRAVENOUS at 12:12

## 2021-04-23 RX ADMIN — PIPERACILLIN AND TAZOBACTAM 4.5 G: 4; .5 INJECTION, POWDER, LYOPHILIZED, FOR SOLUTION INTRAVENOUS; PARENTERAL at 05:20

## 2021-04-23 RX ADMIN — DULOXETINE HYDROCHLORIDE 60 MG: 60 CAPSULE, DELAYED RELEASE ORAL at 05:31

## 2021-04-23 RX ADMIN — Medication 350 MCG/HR: at 02:07

## 2021-04-23 RX ADMIN — POTASSIUM BICARBONATE 25 MEQ: 978 TABLET, EFFERVESCENT ORAL at 10:00

## 2021-04-23 RX ADMIN — PROPOFOL 50 MCG/KG/MIN: 10 INJECTION, EMULSION INTRAVENOUS at 20:45

## 2021-04-23 RX ADMIN — DULOXETINE HYDROCHLORIDE 60 MG: 60 CAPSULE, DELAYED RELEASE ORAL at 18:00

## 2021-04-23 RX ADMIN — RISPERIDONE 2 MG: 1 TABLET, FILM COATED ORAL at 05:30

## 2021-04-23 RX ADMIN — FAMOTIDINE 20 MG: 20 TABLET ORAL at 18:00

## 2021-04-23 RX ADMIN — DIVALPROEX SODIUM 500 MG: 125 CAPSULE ORAL at 05:32

## 2021-04-23 RX ADMIN — FUROSEMIDE 40 MG: 10 INJECTION, SOLUTION INTRAMUSCULAR; INTRAVENOUS at 05:25

## 2021-04-23 RX ADMIN — FAMOTIDINE 20 MG: 10 INJECTION INTRAVENOUS at 05:27

## 2021-04-23 RX ADMIN — DIVALPROEX SODIUM 500 MG: 125 CAPSULE ORAL at 22:13

## 2021-04-23 RX ADMIN — ACETAMINOPHEN 650 MG: 325 TABLET, FILM COATED ORAL at 20:22

## 2021-04-23 RX ADMIN — PAROXETINE HYDROCHLORIDE 10 MG: 20 TABLET, FILM COATED ORAL at 05:29

## 2021-04-23 RX ADMIN — DIVALPROEX SODIUM 500 MG: 125 CAPSULE ORAL at 13:52

## 2021-04-23 RX ADMIN — PROPOFOL 50 MCG/KG/MIN: 10 INJECTION, EMULSION INTRAVENOUS at 16:44

## 2021-04-23 ASSESSMENT — FIBROSIS 4 INDEX: FIB4 SCORE: 0.37

## 2021-04-23 ASSESSMENT — PAIN DESCRIPTION - PAIN TYPE
TYPE: ACUTE PAIN;CHRONIC PAIN
TYPE: ACUTE PAIN
TYPE: ACUTE PAIN;CHRONIC PAIN
TYPE: ACUTE PAIN;CHRONIC PAIN
TYPE: ACUTE PAIN
TYPE: ACUTE PAIN
TYPE: ACUTE PAIN;CHRONIC PAIN
TYPE: ACUTE PAIN
TYPE: ACUTE PAIN
TYPE: ACUTE PAIN;CHRONIC PAIN

## 2021-04-23 NOTE — CONSULTS
Cardiology Consult Note:    Lisa Garvin M.D.  Date & Time note created:    4/23/2021   10:25 AM     Referring MD:  Dr. Loya    Patient ID:   Name:             Enedelia Pang   YOB: 1970  Age:                 50 y.o.  female   MRN:               [id number]                                                             Chief Complaint / Reason for consult:  MICAH for evaluation of endocarditis.    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  Cardiology is been consulted for evaluation of endocarditis.    Most information is obtained through talking with primary care team along with chart review.    To my knowledge, there is no evidence of family history of sudden death.    Review of Systems:      Patient is currently intubated.  She is sedated.    Past Medical History:   Past Medical History:   Diagnosis Date   • Allergy, unspecified not elsewhere classified    • Anemia 10/05/2017    Unsure if a current issue.   • Anesthesia     Migrane after anesthesia   • Anxiety    • Anxiety, generalized    • Arthritis    • autoimmune    • Autoimmune disorder (HCC)     Unknown etiology or type   • Back pain    • Clostridium difficile diarrhea 2014    follow up tests negative   • Depression    • Elevated liver enzymes 2017    Related to meds.   • Fall    • H/O Clostridium difficile infection 2014   • Hx MRSA infection 2003    with neurostimulator implant   • Hydrocephalus (HCC) 2015    with lumbar shunt   • Hyponatremia 9/28/2019   • Joint pain 09/10/2019    Especially right shoulder   • Migraine with aura, with intractable migraine, so stated, without mention of status migrainosus     from undefined autoimmune issue   • MRSA (methicillin resistant Staphylococcus aureus) 08/2015    to lumbar  shunt and power port   • MRSA (methicillin resistant Staphylococcus aureus) 12/2017    left foot autoimmune decay   • MRSA (methicillin resistant Staphylococcus aureus) 2018    to neurostimulator.    • Pituitary abnormality (Formerly [facility]) 2002   • Port or reservoir infection 10/3/2015   • Requires supplemental oxygen     to treat migraines. 2-5L/NC   • Seizure (Formerly [facility]) started 2017    Unknown etiology-not sure if related to autoimmune issue. Last seizure 2/2019   • Sepsis (Formerly [facility]) 8/30/2015   • Staph infection    • Stroke (Formerly [facility]) 2007     with right side residual weakness     Active Hospital Problems    Diagnosis    • Acute respiratory failure with hypoxia (Former[facility]) [J96.01]      Priority: High   • Right-sided acute bacterial endocarditis [I33.0]      Priority: High   • Empyema of right pleural space (Formerly [facility]) [J86.9]      Priority: High   • Septic shock (Former[facility]) [A41.9, R65.21]      Priority: High   • Acute encephalopathy [G93.40]      Priority: High   • Acute blood loss anemia [D62]      Priority: Medium   • CSF pleocytosis [D72.9]      Priority: Medium   • Bacteremia due to methicillin susceptible Staphylococcus aureus (MSSA) [R78.81, B95.61]      Priority: Medium   • Pneumonia [J18.9]      Priority: Medium   • Thrombocytopenia ([facility]) [D69.6]      Priority: Medium   • S/P  shunt [Z98.2]      Priority: Medium   • Prolonged Q-T interval on ECG [R94.31]      Priority: Low   • History of vasculitis [Z86.79]      Priority: Low   • History of complicated migraines [G43.109]      Priority: Low   • H/O: CVA (cerebrovascular accident) [Z86.73]      Priority: Low   • Chronic pain syndrome [G89.4]      Priority: Low   • Empyema of left pleural space ([facility]) [J86.9]        Past Surgical History:  Past Surgical History:   Procedure Laterality Date   • OTHER Left 01/23/2020    Port placement left chest   • PB IMPLANT NEUROSTIM/  9/13/2019    Procedure: INSERTION, NEUROSTIMULATOR, PERMANENT, SPINAL CORD;  Surgeon: Seven Mahoney M.D.;  Location:  SURGERY [facility];  Service: Pain Management   • SPINAL CORD STIMULATOR  2018    Explanted   • FULL THICKNESS SKIN GRAFT Left 2018     graft to foot (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   • OTHER SURGICAL PROCEDURE  11/10/2017    Power port   • LIVER BIOPSY  2017   •  SHUNT INSERTION  2017    Procedure:  SHUNT INSERTION;  Surgeon: Drew Berry M.D.;  Location: SURGERY [facility];  Service:    • SPINAL CORD STIMULATOR  2016    Procedure: SPINAL CORD STIMULATOR FOR: PLACEMENT OF LEFT FLANK OCCIPITAL NERVE STIMULATOR BATTERY PACK;  Surgeon: Oli Oh III, M.D.;  Location: SURGERY [facility];  Service:    • LUMBAR EXPLORATION N/A 2015    Procedure: LUMBAR EXPLORATION- Lumbar shunt removal ;  Surgeon: Oli Oh III, M.D.;  Location: SURGERY [facility];  Service:    • OTHER NEUROLOGICAL SURG  2015    Explanted lumbar shunt and explanted power port due to MRSA   • IRRIGATION & DEBRIDEMENT NEURO N/A 2015    Procedure: IRRIGATION & DEBRIDEMENT NEURO;  Surgeon: Oli Oh III, M.D.;  Location: SURGERY [facility];  Service:    • SPINAL CORD STIMULATOR      1st implant   • CHOLECYSTECTOMY      had ruptured day before   • ENDOMETRIAL ABLATION     • TUBAL COAGULATION LAPAROSCOPIC BILATERAL Bilateral    • KNEE ARTHROSCOPY Right    • TONSILLECTOMY     • APPENDECTOMY     • OTHER NEUROLOGICAL SURG  [id number]    occipital nerve stimulator-revisions for total of 9 procedures       Hospital Medications:    Current Facility-Administered Medications:   •  potassium bicarbonate (KLYTE) effervescent tablet 25 mEq, 25 mEq, Enteral Tube, Q4HRS, Viraj Burgos M.D.  •  ceFEPIme (MAXIPIME) 1 g in  mL IVPB, 1 g, Intravenous, Q8HRS, Marlon Cox D.O.  •  propofol (DIPRIVAN) injection, 0-80 mcg/kg/min, Intravenous, Continuous, Last Rate: 9.9 mL/hr at 21, 20 mcg/kg/min at 21  **AND** Triglycerides Starting now and then Every 3 Days, , , Every 3 Days (0300), Viraj Burgos M.D.  •  furosemide (LASIX) injection 40 mg, 40 mg, Intravenous, Q DAY, Viraj Burgos M.D., 40 mg at 04/23/21 0525  •  fentaNYL (SUBLIMAZE) 50 mcg/mL in 50mL, , Intravenous, Continuous, Viraj Burgos M.D., Last Rate: 3 mL/hr at 04/23/21 0921, 150 mcg/hr at 04/23/21 0921  •  methylPREDNISolone (SOLU-MEDROL) 40 MG injection 40 mg, 40 mg, Intravenous, Q6HRS, Viraj Burgos M.D., 40 mg at 04/23/21 0525  •  Pharmacy Consult: Enteral tube insertion - review meds/change route/product selection, 1 Each, Other, PHARMACY TO DOSE, Elliott Salvador M.D.  •  acetaminophen (Tylenol) tablet 650 mg, 650 mg, Enteral Tube, Q4HRS PRN, Elliott Salvador M.D., 650 mg at 04/22/21 0904  •  senna-docusate (PERICOLACE or SENOKOT S) 8.6-50 MG per tablet 2 tablet, 2 tablet, Enteral Tube, BID, 2 tablet at 04/23/21 0531 **AND** polyethylene glycol/lytes (MIRALAX) PACKET 1 Packet, 1 Packet, Enteral Tube, QDAY PRN **AND** magnesium hydroxide (MILK OF MAGNESIA) suspension 30 mL, 30 mL, Enteral Tube, QDAY PRN **AND** bisacodyl (DULCOLAX) suppository 10 mg, 10 mg, Rectal, QDAY PRN, Elliott Salvador M.D.  •  divalproex (DEPAKOTE SPRINKLE) capsule 500 mg, 500 mg, Enteral Tube, Q8HRS, Elliott Salvador M.D., 500 mg at 04/23/21 0532  •  DULoxetine (CYMBALTA) capsule 60 mg, 60 mg, Enteral Tube, BID, Elliott Salvador M.D., 60 mg at 04/23/21 0531  •  PARoxetine (PAXIL) tablet 10 mg, 10 mg, Enteral Tube, QAM, Elliott Salvador M.D., 10 mg at 04/23/21 0529  •  risperiDONE (RISPERDAL) tablet 2 mg, 2 mg, Enteral Tube, BID, Elliott Salvador M.D., 2 mg at 04/23/21 0530  •  Respiratory Therapy Consult, , Nebulization, Continuous RT, Man Wahl M.D.  •  famotidine (PEPCID) tablet 20 mg, 20 mg, Enteral Tube, Q12HRS, 20 mg at 04/22/21 1718 **OR** famotidine (PEPCID) injection 20 mg, 20 mg, Intravenous, Q12HRS, Man Wahl M.D., 20 mg at 04/23/21  "0527  •  MD Alert...ICU Electrolyte Replacement per Pharmacy, , Other, PHARMACY TO DOSE, Man Wahl M.D.  •  lidocaine (XYLOCAINE) 1 % injection 1-2 mL, 1-2 mL, Tracheal Tube, Q30 MIN PRN, Man Wahl M.D.  •  ipratropium-albuterol (DUONEB) nebulizer solution, 3 mL, Nebulization, Q2HRS PRN (RT), Man Wahl M.D.  •  fentaNYL (SUBLIMAZE) injection 25 mcg, 25 mcg, Intravenous, Q HOUR PRN **OR** fentaNYL (SUBLIMAZE) injection 50 mcg, 50 mcg, Intravenous, Q HOUR PRN **OR** fentaNYL (SUBLIMAZE) injection 100 mcg, 100 mcg, Intravenous, Q HOUR PRN, Man Wahl M.D., 100 mcg at 04/17/21 0523  •  norepinephrine (Levophed) infusion 8 mg/250 mL (premix), 0-30 mcg/min, Intravenous, Continuous, Man Wahl M.D., Last Rate: 3.8 mL/hr at 04/23/21 0919, 2 mcg/min at 04/23/21 0919    Current Outpatient Medications:  Medications Prior to Admission   Medication Sig Dispense Refill Last Dose   • Multiple Vitamins-Minerals (WOMENS MULTIVITAMIN) Tab Take 1 tablet by mouth 2 times a day. \"Equate Women's Multivitamin/Multimineral\"   4/11/2021 at AM   • naproxen (ANAPROX) 220 MG tablet Take 440 mg by mouth every 12 hours as needed (Migraine). 2 tablets = 440 mg.   4/11/2021 at Afternoon   • sodium chloride (SALT) 1 GM Tab Take 1 tablet by mouth every day. (Patient taking differently: Take 1 g by mouth every day. New medication NOT STARTED.) 30 tablet 0 New medication at NOT STARTED   • Ubrogepant (UBRELVY) 100 MG Tab Take 100 mg by mouth 2 times a day as needed (Migraine). May take a 2nd dose after 4 hours if migraine persists. Max of 2 tablets in 24 hours.   4/11/2021 at Afternoon   • divalproex ER (DEPAKOTE ER) 250 MG TABLET SR 24 HR Take 500 mg by mouth 3 times a day. 2 tablets = 500 mg.   4/8/2021 at Unknown time   • DULoxetine (CYMBALTA) 60 MG Cap DR Particles delayed-release capsule Take 60 mg by mouth 2 times a day.   4/11/2021 at Afternoon   • AIMOVIG 70 MG/ML Solution Auto-injector Inject 70 mg " "under the skin Q30 DAYS.   April 2021 at \"Beginning of month\"   • PARoxetine (PAXIL) 10 MG Tab Take 10 mg by mouth every morning.   4/11/2021 at AM   • levETIRAcetam (KEPPRA) 750 MG tablet Take 1 Tab by mouth 2 Times a Day. (Patient not taking: Reported on 4/8/2021) 60 Tab 0 NOT TAKING at NOT TAKING   • ketorolac (TORADOL) 60 MG/2ML Solution Inject 60 mg into the shoulder, thigh, or buttocks 3 times a day as needed (Migraine).   4/11/2021 at Afternoon   • diphenhydrAMINE (BENADRYL) 50 MG/ML Solution Inject 75 mg into the shoulder, thigh, or buttocks 3 times a day as needed (Migraine).   4/11/2021 at Afternoon   • risperiDONE (RISPERDAL) 2 MG Tab Take 2 mg by mouth 2 times a day.   4/11/2021 at AM   • aspirin 81 MG EC tablet Take 81 mg by mouth every morning.   4/11/2021 at AM       Medication Allergy:  Allergies   Allergen Reactions   • Sumatriptan Anaphylaxis     Historical=\"Heart stops.\" Reaction  between 1995 to 1997   • Prochlorperazine Rash and Swelling     Tongue swelling. Reaction as a teen. (compazine)  Tolerated Phenergan on 02/24/15   • Vancomycin Shortness of Breath and Rash     Reaction in 2005.  D/W patient 8/31/15 - tolerated loading dose of vancomycin administered on 8/30/15 with some itching to chest with decreased infusion rate. Red Man Syndrome.  2018-tolerated slow drip with benadryl pre-med-had no problems.       Family History:  Family History   Problem Relation Age of Onset   • Mult Sclerosis Mother    • Diabetes Father    • Alcohol abuse Father        Social History:  Social History     Socioeconomic History   • Marital status:      Spouse name: Not on file   • Number of children: Not on file   • Years of education: Not on file   • Highest education level: Not on file   Occupational History   • Occupation:      Comment: on disability   Tobacco Use   • Smoking status: Never Smoker   • Smokeless tobacco: Never Used   Substance and Sexual Activity   • Alcohol use: Not " "Currently   • Drug use: Never   • Sexual activity: Not on file   Other Topics Concern   • Not on file   Social History Narrative   • Not on file     Social Determinants of Health     Financial Resource Strain:    • Difficulty of Paying Living Expenses:    Food Insecurity:    • Worried About Running Out of Food in the Last Year:    • Ran Out of Food in the Last Year:    Transportation Needs:    • Lack of Transportation (Medical):    • Lack of Transportation (Non-Medical):    Physical Activity:    • Days of Exercise per Week:    • Minutes of Exercise per Session:    Stress:    • Feeling of Stress :    Social Connections:    • Frequency of Communication with Friends and Family:    • Frequency of Social Gatherings with Friends and Family:    • Attends Taoism Services:    • Active Member of Clubs or Organizations:    • Attends Club or Organization Meetings:    • Marital Status:    Intimate Partner Violence:    • Fear of Current or Ex-Partner:    • Emotionally Abused:    • Physically Abused:    • Sexually Abused:          Physical Exam:  Vitals/ General Appearance:   Weight/BMI: Body mass index is 32.27 kg/m².  /50   Pulse (!) 105   Temp 38 °C (100.4 °F) (Bladder)   Resp 20   Ht 1.6 m (5' 2.99\")   Wt 82.6 kg (182 lb 1.6 oz)   SpO2 96%   Vitals:    04/23/21 0815 04/23/21 0830 04/23/21 0845 04/23/21 0950   BP:  104/50     Pulse: (!) 123 (!) 119 (!) 109 (!) 105   Resp: (!) 23 (!) 21 20    Temp:       TempSrc:       SpO2: 99% 96% 98% 96%   Weight:       Height:         Oxygen Therapy:  Pulse Oximetry: 96 %, FiO2%: 40 %, O2 Delivery Device: Ventilator  Constitutional:   Intubated status  HENMT:  Trach tube is in place properly  Eyes: No discharge.  Neck:  Unable to assess for JVD due to intubated status  Cardiovascular: regular rhythm, No murmurs, No rubs, No gallops.   Extremities with no edema   Lungs:  +BS anteriorly  Abdomen: no distention  Skin: Warm  Neurologic: intubated status  Psychiatric: intubated " status        MDM (Data Review):     Records reviewed and summarized in current documentation    Lab Data Review:  Recent Results (from the past 24 hour(s))   Potassium Serum (K)    Collection Time: 04/22/21 11:46 AM   Result Value Ref Range    Potassium 3.5 (L) 3.6 - 5.5 mmol/L   CBC WITHOUT DIFFERENTIAL    Collection Time: 04/22/21 11:46 AM   Result Value Ref Range    WBC 20.3 (H) 4.8 - 10.8 K/uL    RBC 2.34 (L) 4.20 - 5.40 M/uL    Hemoglobin 6.8 (L) 12.0 - 16.0 g/dL    Hematocrit 20.2 (L) 37.0 - 47.0 %    MCV 86.3 81.4 - 97.8 fL    MCH 29.1 27.0 - 33.0 pg    MCHC 33.7 33.6 - 35.0 g/dL    RDW 52.8 (H) 35.9 - 50.0 fL    Platelet Count 525 (H) 164 - 446 K/uL    MPV 9.0 9.0 - 12.9 fL   BASIC TEG W HEPARINASE    Collection Time: 04/22/21 11:46 AM   Result Value Ref Range    React Time Initial Hep 7.9 5.0 - 10.0 min    Clot Kinetics Heparinase 0.8 (L) 1.0 - 3.0 min    Clot Angle Heparinase 80.0 (H) 53.0 - 72.0 degrees    Max Clot Heparinase 82.0 (H) 50.0 - 70.0 mm    Lysis 30 min Heparinase 0.1 0.0 - 8.0 %    TEG Algorithm Link Algorithm    BASIC TEG    Collection Time: 04/22/21 11:46 AM   Result Value Ref Range    Reaction Time Initial-R 7.7 5.0 - 10.0 min    Clot Kinetics-K 1.0 1.0 - 3.0 min    Clot Angle-Angle 75.8 (H) 53.0 - 72.0 degrees    Maximum Clot Strength-MA 83.2 (H) 50.0 - 70.0 mm    Lysis 30 minutes-LY30 0.0 0.0 - 8.0 %   FRESH FROZEN PLASMA    Collection Time: 04/22/21  6:22 PM   Result Value Ref Range    Component F       TA3                 Thawed Plasma 3     [id number]   transfused   04/22/21   20:13      Product Type Thawed Apheresis Plasma 3rd Cont     Dispense Status transfused     Unit Number (Barcoded) [id number]     Product Code (Barcoded) [id number]     Blood Type (Barcoded) 8400    CBC with Differential    Collection Time: 04/23/21  5:18 AM   Result Value Ref Range    WBC 21.4 (H) 4.8 - 10.8 K/uL    RBC 2.32 (L) 4.20 - 5.40 M/uL    Hemoglobin 7.1 (L) 12.0 - 16.0 g/dL    Hematocrit 21.0 (L)  37.0 - 47.0 %    MCV 90.5 81.4 - 97.8 fL    MCH 30.6 27.0 - 33.0 pg    MCHC 33.8 33.6 - 35.0 g/dL    RDW 53.3 (H) 35.9 - 50.0 fL    Platelet Count 507 (H) 164 - 446 K/uL    MPV 8.8 (L) 9.0 - 12.9 fL    Neutrophils-Polys 83.00 (H) 44.00 - 72.00 %    Lymphocytes 5.90 (L) 22.00 - 41.00 %    Monocytes 8.80 0.00 - 13.40 %    Eosinophils 0.00 0.00 - 6.90 %    Basophils 0.60 0.00 - 1.80 %    Immature Granulocytes 1.70 (H) 0.00 - 0.90 %    Nucleated RBC 0.10 /100 WBC    Neutrophils (Absolute) 17.74 (H) 2.00 - 7.15 K/uL    Lymphs (Absolute) 1.26 1.00 - 4.80 K/uL    Monos (Absolute) 1.87 (H) 0.00 - 0.85 K/uL    Eos (Absolute) 0.00 0.00 - 0.51 K/uL    Baso (Absolute) 0.12 0.00 - 0.12 K/uL    Immature Granulocytes (abs) 0.36 (H) 0.00 - 0.11 K/uL    NRBC (Absolute) 0.02 K/uL   Basic Metabolic Panel (BMP)    Collection Time: 04/23/21  5:18 AM   Result Value Ref Range    Sodium 134 (L) 135 - 145 mmol/L    Potassium 3.3 (L) 3.6 - 5.5 mmol/L    Chloride 100 96 - 112 mmol/L    Co2 27 20 - 33 mmol/L    Glucose 85 65 - 99 mg/dL    Bun 17 8 - 22 mg/dL    Creatinine 0.47 (L) 0.50 - 1.40 mg/dL    Calcium 7.7 (L) 8.5 - 10.5 mg/dL    Anion Gap 7.0 7.0 - 16.0   Triglyceride    Collection Time: 04/23/21  5:18 AM   Result Value Ref Range    Triglycerides 224 (H) 0 - 149 mg/dL   ESTIMATED GFR    Collection Time: 04/23/21  5:18 AM   Result Value Ref Range    GFR If African American >60 >60 mL/min/1.73 m 2    GFR If Non African American >60 >60 mL/min/1.73 m 2       Imaging/Procedures Review:    Chest Xray:  Reviewed    EKG:   As in HPI.     MDM (Assessment and Plan):     Active Hospital Problems    Diagnosis    • Acute respiratory failure with hypoxia (HCC) [J96.01]      Priority: High   • Right-sided acute bacterial endocarditis [I33.0]      Priority: High   • Empyema of right pleural space (HCC) [J86.9]      Priority: High   • Septic shock (HCC) [A41.9, R65.21]      Priority: High   • Acute encephalopathy [G93.40]      Priority: High   • Acute  blood loss anemia [D62]      Priority: Medium   • CSF pleocytosis [D72.9]      Priority: Medium   • Bacteremia due to methicillin susceptible Staphylococcus aureus (MSSA) [R78.81, B95.61]      Priority: Medium   • Pneumonia [J18.9]      Priority: Medium   • Thrombocytopenia (HCC) [D69.6]      Priority: Medium   • S/P  shunt [Z98.2]      Priority: Medium   • Prolonged Q-T interval on ECG [R94.31]      Priority: Low   • History of vasculitis [Z86.79]      Priority: Low   • History of complicated migraines [G43.109]      Priority: Low   • H/O: CVA (cerebrovascular accident) [Z86.73]      Priority: Low   • Chronic pain syndrome [G89.4]      Priority: Low   • Empyema of left pleural space (HCC) [J86.9]          MICAH was performed at the bedside which show evidence of large vegetation seen on tricuspid valve leaflets and mitral valve leaflets.  Mild TR and mild MR.  There is no evidence of aortic root abscess.  No evidence of pulmonic valve involvement.    No further invasive cardiac work-up at this time.    Overall, patient appears to be poor candidate for any open heart procedure at this time.  She does appear to have relatively poor prognosis overall.    Continue IV antibiotics via protocol with ID.    Lisa Garvin MD.   Cardiology Inpatient Service.  [facility].  [phone].  Nissa Metzger.

## 2021-04-23 NOTE — CARE PLAN
Problem: Safety  Goal: Will remain free from falls  Outcome: PROGRESSING AS EXPECTED  Note: Pt remains free from falls at this time. Safety precautions in place. Pt educated on calling for assistance when needed.        Problem: Knowledge Deficit  Goal: Knowledge of disease process/condition, treatment plan, diagnostic tests, and medications will improve  4/23/2021 1129 by Michelle Bansal R.N.  Outcome: PROGRESSING SLOWER THAN EXPECTED  Note: Pt updated on POC, tests, and medications. Pt educated on calling for any more questions.

## 2021-04-23 NOTE — PROGRESS NOTES
Infectious Disease Daily Progress Note    Date of Service  4/23/2021    Chief Complaint  Zosyn 4/22-present    Previous: Unasyn, Zosyn, Vanco, Daptomycin  Ceftaroline: start 4/13-4/15  Nafcillin 2g Q4 hrs 4/15-4/23 4/14: Unable to give name of previous NSG or neurologist. Seems confused, but able to say some sentences fluently.   4/15: Line cultures now growing MSSA. As well as from the blood. Repeat blood culture 4/13+ in both sets. Patient has worsening confusion. CSF fluid analyses suggest pleocytosis. Cultures are no growth from the CSF. Chest CT was ordered this morning indicating a loculated right pleural effusion as well as bilateral septic emboli. Dr. Mack spoke with Dr. Oh yesterday and no surgical intervention unless clinical deterioration.  4/16: Increased respiratory distress.  Tachypneic.  CT with loculated collection.  Dr Resendiz not available.  No thoracic surgeon available.  Plans to have pulmonary place CT.  Transferring to ICU.  4/17: Transferred to [facility] last night. Hgb dropped to 6.4 requiring PRBC transfusion. Requiring 2 pressors. Fio2 50%. Febrile 38.8. Pending OR decortication of empyema and hemothorax. Chest [facility] at [facility] shows 8,371 WBC, ,000, 74% N  4/18: Chest tube was upsized by surgery yesterday, no decortication. WBC 22k. Fio2 40%. Vasopressin. Levophed down to 2mcg. Afebrile 24 hrs. Last fever 38.6 on 4/16.   4/19: Still on vent. Levo 3 mcg, vasopressin. Afebrile 72 hours. WBC 21k. BC 4/17 NGTD x 48 hours. Unable to do MRI brain given neurostimulator per RN.   4/20: Remaining afebrile. Hb 6.2 and undergoing transfusion today.WBC 24,100, 5% bands.1700 ml CT output. Copious bloody ET secretions. No pressors. Receiving dornase and TPA per CT. Right pleural effusion not large per CXR today.   4/21: WBC 31k. Bronchoscopy yesterday showing blood. Cultures sent. TPA on hold per RN due to arvind blood from chest tube requiring PRBC transfusion for hgb 6. Pending chest  CT today. Per , spinal stimulator is non-MRI compatible. Levophed 10mcg. 30% Fio2. Afebrile.   4/22: Fever 101.3 this am. WBC 20,300 down from 32,200 yesterday. BAL growing Klebsiella pneumoniae but susceptibility panel not set up until today. CTA of brain shows no lesion. CT of chest shows enlarging cavitary lung lesions bilat and large loculated new left pleural effusion and large hydropneumothorax on right. TPA & dornase infusions of right chest ended 4/20 after considerable bleeding requiring transfusion. Hb now down again to 6.8.  4/23: WBC 23k. Fio2 40%. Tmax 38.1. US of stiumlator shows small fluid collection but no drainable abscess. Pending MICAH today. Pending L chest tube placement    Review of Systems  Review of Systems   Unable to perform ROS: Acuity of condition        Physical Exam  Temp:  [38 °C (100.4 °F)-38.1 °C (100.6 °F)] 38 °C (100.4 °F)  Pulse:  [] 105  Resp:  [20-60] 20  BP: ()/() 104/50  SpO2:  [51 %-100 %] 96 %    Physical Exam  Constitutional:       Comments: Intubated sedated . Exam unchanged  HENT:      Head: Normocephalic.      Comments: ET tube  Cardiovascular:      Rate and Rhythm: Normal rate and regular rhythm.      Heart sounds: No murmur.   Pulmonary:      Comments: Diminished R side with rales. R chest tube with scant blood.   Abdominal:      General: Abdomen is flat. Bowel sounds are normal. There is no distension.   Musculoskeletal:         General: No swelling or deformity.   Skin:     Comments: R chest tube. Stimulator palpable, non induratated  Fluids  Fluids    Intake/Output Summary (Last 24 hours) at 4/23/2021 1003  Last data filed at 4/23/2021 0800  Gross per 24 hour   Intake 4810.37 ml   Output 1410 ml   Net 3400.37 ml       Laboratory  Recent Labs     04/22/21  0455 04/22/21  0455 04/22/21  0849 04/22/21  1146 04/23/21  0518   WBC 21.5*  --   --  20.3* 21.4*   RBC 2.50*  --   --  2.34* 2.32*   HEMOGLOBIN 7.4*   < > 6.8* 6.8* 7.1*   HEMATOCRIT 21.6*   --   --  20.2* 21.0*   MCV 86.4  --   --  86.3 90.5   MCH 29.6  --   --  29.1 30.6   MCHC 34.3  --   --  33.7 33.8   RDW 53.1*  --   --  52.8* 53.3*   PLATELETCT 529*  --   --  525* 507*   MPV 9.2  --   --  9.0 8.8*    < > = values in this interval not displayed.     Recent Labs     04/21/21  0320 04/21/21  0320 04/22/21  0455 04/22/21  1146 04/23/21  0518   SODIUM 127*  --  133*  --  134*   POTASSIUM 3.9   < > 3.4* 3.5* 3.3*   CHLORIDE 92*  --  97  --  100   CO2 28  --  28  --  27   GLUCOSE 119*  --  117*  --  85   BUN 21  --  20  --  17   CREATININE 0.82  --  0.53  --  0.47*   CALCIUM 7.2*  --  7.3*  --  7.7*    < > = values in this interval not displayed.     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   -Acute septic emboli bilaterally  -Acute bilateral pneumonia due to above     --Empyema on right presumed due to MSSA but Klebsiella may be involved too; new empyema on left   -Pseudotumor cerebri with underlying  shunt  -Chronic migraine headaches  -History of autoimmune vasculitis  -Elevated alkaline phosphatase & bilirubin  -Acute encephalopathy due to sepsis staphylococcal, meningitis ruled out      - hemothorax R      - anemia due to above      - New secondary Klebsiella pneumoniae pneumonia (presumed VAP)     Plan   Patient has evidence of disseminated staphylococcal sepsis source from her line with infection to her lungs, tricuspid valve and  R empyema requiring transfer to [facility] for decortication due to lack of CT surgery support.  However not a surgical candidate after all. Now with bilateraly cavitary lung abscess/empyema  on the left as well. Sputum cultures now growing additional Kleb    Persistant fever likely attrituble to L empyema without source control.     - Pt needs CNS coverage for initial MSSA infection. Zosyn does not penetrate CNS. Will switch to Cefepime 1gm Q8hrs  - Cont to follow Kleb pneumoniae MICs  - pending chest tube placement on L lung abscess  - Pending MICAH today  - US of spinal stiumalator shows ill defined fluid collection, but undrainable. NSGY does not recommend stiumulator removal due to instability  - 4/17 blood cultures NGTD  - central line exchanged 4/22  - initial CSF cultures has pleocytosis, but no culture growth  - chest tube upsized by surgery 4/17.   - blood culture from 4/14 at [facility] positive.Admission blood cultures here on 4/16 both positive.  4/17 blood cultures NGTD  - Central line now has been changed 4/22  - Continue to follow CSF 4/13 negative and pleural cultures 4/16 negative. CSF findings on 4/13:  63 wbc, 82% PMN, protein 97.  - original infected port removed 4/12  - vent management per pulmonology      Condition remains critically ill      45 min spent with 50% face to face care and critical care time involvement  Thank you for this consult. Case discussed with CAROLINA

## 2021-04-23 NOTE — CARE PLAN
Problem: Infection  Goal: Will remain free from infection  Outcome: PROGRESSING SLOWER THAN EXPECTED  Intervention: Implement standard precautions and perform hand washing before and after patient contact  Note: Standard precautions in place, hand washing done prior to and after pt care.      Problem: Safety - Medical Restraint  Goal: Remains free of injury from restraints (Restraint for Interference with Medical Device)  Description: INTERVENTIONS:  1. Determine that other, less restrictive measures have been tried or would not be effective before applying the restraint  2. Evaluate the patient's condition at the time of restraint application  3. Inform patient/family regarding the reason for restraint  4. Q2H: Monitor safety, psychosocial status, comfort, nutrition and hydration  Outcome: PROGRESSING AS EXPECTED  Flowsheets (Taken 4/22/2021 1925)  Addressed this shift: Remains free of injury from restraints (restraint for interference with medical device):   Determine that other, less restrictive measures have been tried or would not be effective before applying the restraint   Every 2 hours: Monitor safety, psychosocial status, comfort, nutrition and hydration  Note: Pt assessed q 2 hours: ROM, clean dry sheets, comfort and ensuring hydration which at this time pt is NPO due to tomorrows procedure.

## 2021-04-23 NOTE — PROGRESS NOTES
Received bedside report assumed care of pt. Pt resting in bed. Bed alarm on and in lowest position. Restraints verified. Drips verified.VS stable, one unit of blood currently infusing. Updated whiteboard in room.

## 2021-04-23 NOTE — CONSULTS
Neurosurgery Consultation     Date of Service  4/22/2021     Primary Care Physician  Elliott Power M.D.     Code Status  Full Code     Chief Complaint  sepsis        History of Presenting Illness  Patient intubated so history obtained from chart review.     50 y.o. female admitted with sepsis and endocraditis, intubated upon arrival.  She has PMHx pseudotumor cerebri s/p  shunt and port placement for chronic pain management.  She has recurrent port infections and subsequent replacements who was transferred from [facility] where she had been admitted on 4/11 for MSSA bacteremia secondary to recurrent port infection.  Her port was removed on 4/12 and her antibiotics were being managed by Nataly LEIGH. She developed tricuspid valve endocarditis and her antibiotics were changed to nafcillin on 4/14.  Her hospitalization was further complicated by encephalopathy and she underwent LP on 4/14 which was not significant.  Dr. Oh (neurosurgery) was consulted at this time and did not recommend removal of shunt.  She underwent CT chest on 4/15 which showed septic emboli in the right greater than left lung with associated large right effusion.  On ultrasound this effusion had multiple loculations and evidence of empyema.  This morning patient's hemoglobin dropped from 8.3 down to 6.9 for which she was given 1 unit PRBCs.  She also developed respiratory distress requiring intubation.  Chest tube was placed with 300 cc of bloody output.    She is currently receiving a blood transfusion and requiring Norepinephrine for SBP support.        Review of Systems  Review of Systems   Unable to perform ROS: Intubated         Past Medical History   has a past medical history of Allergy, unspecified not elsewhere classified, Anemia (10/05/2017), Anesthesia, Anxiety, Anxiety, generalized, Arthritis, autoimmune, Autoimmune disorder (HCC), Back pain, Clostridium difficile diarrhea (2014), Depression, Elevated liver  "enzymes (2017), Fall, H/O Clostridium difficile infection (2014), MRSA infection (2003), Hydrocephalus ([facility]) (2015), Joint pain (09/10/2019), Migraine with aura, with intractable migraine, so stated, without mention of status migrainosus, MRSA (methicillin resistant Staphylococcus aureus) (08/2015), MRSA (methicillin resistant Staphylococcus aureus) (12/2017), MRSA (methicillin resistant Staphylococcus aureus) (2018), Pituitary abnormality ([facility]) (2002), Requires supplemental oxygen, Seizure ([facility]) (started 2017), Staph infection, and Stroke ([facility]) (2007).     Surgical History   has a past surgical history that includes tonsillectomy (1985); other neurological surg ([id number]); irrigation & debridement neuro (N/A, 6/28/2015); lumbar exploration (N/A, 8/31/2015); spinal cord stimulator (12/19/2016);  shunt insertion (5/31/2017); liver biopsy (07/24/2017); cholecystectomy (2001); appendectomy (1982); spinal cord stimulator (05/24/2018); spinal cord stimulator (2003); knee arthroscopy (Right, 1990); full thickness skin graft (Left, 04/2018); other surgical procedure (11/10/2017); other neurological surg (08/2015); endometrial ablation (2001); tubal coagulation laparoscopic bilateral (Bilateral, 2001); pr implant neurostim/ (9/13/2019); and other (Left, 01/23/2020).      Family History  family history includes Alcohol abuse in her father; Diabetes in her father; Mult Sclerosis in her mother.      Social History   reports that she has never smoked. She has never used smokeless tobacco. She reports previous alcohol use. She reports that she does not use drugs.     Allergies        Allergies   Allergen Reactions   • Sumatriptan Anaphylaxis       Historical=\"Heart stops.\" Reaction  between 1995 to 1997   • Prochlorperazine Rash and Swelling       Tongue swelling. Reaction as a teen. (compazine)  Tolerated Phenergan on 02/24/15   • Vancomycin Shortness of Breath and Rash       Reaction in 2005.  D/W patient 8/31/15 - " "tolerated loading dose of vancomycin administered on 8/30/15 with some itching to chest with decreased infusion rate. Red Man Syndrome.  2018-tolerated slow drip with benadryl pre-med-had no problems.         Medications  Prior to Admission Medications   Prescriptions Last Dose Informant Patient Reported? Taking?   AIMOVIG 70 MG/ML Solution Auto-injector April 2021 at \"Beginning of month\" Significant Other Yes No   Sig: Inject 70 mg under the skin Q30 DAYS.   DULoxetine (CYMBALTA) 60 MG Cap DR Particles delayed-release capsule 4/11/2021 at Afternoon Significant Other Yes No   Sig: Take 60 mg by mouth 2 times a day.   Multiple Vitamins-Minerals (WOMENS MULTIVITAMIN) Tab 4/11/2021 at AM Significant Other Yes Yes   Sig: Take 1 tablet by mouth 2 times a day. \"Equate Women's Multivitamin/Multimineral\"   PARoxetine (PAXIL) 10 MG Tab 4/11/2021 at AM Significant Other Yes No   Sig: Take 10 mg by mouth every morning.   Ubrogepant (UBRELVY) 100 MG Tab 4/11/2021 at Afternoon Significant Other Yes No   Sig: Take 100 mg by mouth 2 times a day as needed (Migraine). May take a 2nd dose after 4 hours if migraine persists. Max of 2 tablets in 24 hours.   aspirin 81 MG EC tablet 4/11/2021 at AM Significant Other Yes No   Sig: Take 81 mg by mouth every morning.   diphenhydrAMINE (BENADRYL) 50 MG/ML Solution 4/11/2021 at Afternoon Significant Other Yes No   Sig: Inject 75 mg into the shoulder, thigh, or buttocks 3 times a day as needed (Migraine).   divalproex ER (DEPAKOTE ER) 250 MG TABLET SR 24 HR 4/8/2021 at Unknown time Significant Other Yes No   Sig: Take 500 mg by mouth 3 times a day. 2 tablets = 500 mg.   ketorolac (TORADOL) 60 MG/2ML Solution 4/11/2021 at Afternoon Significant Other Yes No   Sig: Inject 60 mg into the shoulder, thigh, or buttocks 3 times a day as needed (Migraine).   levETIRAcetam (KEPPRA) 750 MG tablet NOT TAKING at NOT TAKING Significant Other No No   Sig: Take 1 Tab by mouth 2 Times a Day.   Patient not " taking: Reported on 4/8/2021   naproxen (ANAPROX) 220 MG tablet 4/11/2021 at Afternoon Significant Other Yes Yes   Sig: Take 440 mg by mouth every 12 hours as needed (Migraine). 2 tablets = 440 mg.   risperiDONE (RISPERDAL) 2 MG Tab 4/11/2021 at AM Significant Other Yes No   Sig: Take 2 mg by mouth 2 times a day.   sodium chloride (SALT) 1 GM Tab New medication at NOT STARTED Significant Other No No   Sig: Take 1 tablet by mouth every day.   Patient taking differently: Take 1 g by mouth every day. New medication NOT STARTED.      Facility-Administered Medications: None         Physical Exam  Temp:  [36.3 °C (97.3 °F)] 36.3 °C (97.3 °F)  Pulse:  [60-93] 75  Resp:  [20-26] 20  BP: (104-148)/(56-78) 119/67  SpO2:  [96 %-98 %] 96 %     Physical Exam  Vitals and nursing note reviewed.   Constitutional:       Comments: Intubated and sedated     pupils 3mm midline  minmal response to extremity pinch        Laboratory:      Recent Labs     04/16/21  1840   WBC 27.1*   RBC 2.91*   HEMOGLOBIN 7.6*   HEMATOCRIT 22.5*   MCV 77.3*   MCH 26.1*   MCHC 33.8   RDW 54.8*   PLATELETCT 114*   MPV 11.5          Recent Labs     04/16/21  1840   SODIUM 134*   POTASSIUM 3.9   CHLORIDE 97   CO2 25   GLUCOSE 121*   BUN 12   CREATININE 0.75   CALCIUM 7.1*          Recent Labs     04/16/21  1840   GLUCOSE 121*          No results for input(s): NTPROBNP in the last 72 hours.      Recent Labs     04/16/21  1840   TRIGLYCERIDE      AP:  50 year critically ill with multiple severe medical issues.  Ultrasound suggested possbile fluid associated with a spinal cord stimulator battery, but no drainable fluid was noted.  While her battery and stimulator could be removed, this is unlikely a source of infection.  She appears medically unstable for being in a prone procedure, and is currently receiving pRBC and FFP.  She is severely hyponatremic and coagulopathic.  Would not recommend removal of the spinal cord stimulator and battery at this time.  This  could be readdressed when she is more medically stable.

## 2021-04-23 NOTE — PROGRESS NOTES
"Critical Care Progress Note    Date of admission  4/16/2021    Chief Complaint  50 y.o. female the past medical history of pseudotumor cerebri status post  shunt by Dr. Oh, chronic pain with history of placement of nerve stimulator, vasculitis, right anterior chest port for home IV meds with recurrent infection who presented 4/11/2021 with to [facility] with recurrent port infection and was initially treated with vancomycin and Zosyn and then changed to ceftaroline and subsequently switched to nafcillin when MSSA was identified.    Hospital Course  \"50 y.o. female the past medical history of pseudotumor cerebri status post  shunt by Dr. Oh, chronic pain with history of placement of nerve stimulator, vasculitis, right anterior chest port for home IV meds with recurrent infection who presented 4/11/2021 with to [facility] with recurrent port infection and was initially treated with vancomycin and Zosyn and then changed to ceftaroline and subsequently switched to nafcillin when MSSA was identified.  [facility] infectious disease is managing antibiotics.  Dr. Candelaria removed the port on 4/12.  Imaging suggest tricuspid valve endocarditis.  Lumbar puncture performed 4/14 showed pleocytosis with negative Gram stain and culture so far.  She had worsening leukocytosis with WBC up to 35K and chest CT yesterday revealed septic emboli with greater involvement in the right lung as well as a large right pleural effusion.  IR performed a thoracentesis and placed a small pigtail catheter in the right chest which is now at 20 cm of suction.  There were significant loculations and fluid analysis suggested empyema.  Patient subsequently developed increased work of breathing and was intubated and placed on the ventilator yesterday.  Hemoglobin dropped from 8.3-6.9 as they were considering TPA and dornase via this pigtail catheter and instead they transfused 1 unit of blood.  The plan was to consult thoracic surgery but their only " "surgeon is out of town till next week.  Dr. Oh consulted on 4/14 and recommended brain MRI and it was ordered but is still not performed (nerve stimulator not MRI compatible?).  Dr. Oh did not recommend removal of shunt at this time apparently.  The patient's had some hypotension while on the ventilator requiring titration of norepinephrine and vasopressin.  The patient is transferred to [facility] for surgery consultation and Dr. Albert Benedict excepted the patient along with Dr. Gonda on transfer.\"    4/16 admitted to ICU  4/17 VD 2, 2 FFP, pRBC overnight; new chest tube per gen surg  4/18 VD 3, TPa/Dornase with 1400 cc from R chest tube      Interval Problem Update  Reviewed last 24 hour events:   - Chest tube to be placed today   - Neuro: lowering sedation   - HR: 80s-110s   - SBP: 80s-130s   - GI: TF at goal   - UOP: 2L/24 hrs   - Lopez: yes   - Tm: 38.5   - Lines: CVC, Art, R chest tube   - PPx: GI H2 blocker, DVT contraindicated   - ABG: no new   - VD #8   - 20, 320, 8 30%   - CXR (personally reviewed and compared to prior): no new   - Mobility level 1, eligible for progression no   - MSSA bacteremia and K pneumoniae - on Zosyn -> cefepime   - attempted SAT, rhythmic movements of the neck/face -> EEG ordered   - TTE with vegetations of the tricuspid and mitral valves    Yesterday  Supplement K  downtrending wbc  On steroids  Hb 7,.4, improved, 2 uprbc prior day  plt adequate  Continue diuresis as tolerated  New left central line 4/21, prior removed  New cavitary lesions ct 4/21, and loculated left effusion new  IR guided chest tube left side, discussed w/ IR, small window for bedside placement.  Bal 4/20 klebsiella, on nafcillin, no prior culture, pending speciation  ID following, discussed with Dr Rees, he wants to wait for [facility]    Addendum  Given us nerve stimulator has some ? Fluid, discussed with , put in by [facility] pain specialist (Dr Mahoney).  Discussed with neurosurgery Dr Vargas and will see " regarding evaluation to remove for source control regarding infection  Pain clinic that placed no longer in business per   MICAH ordered, discussed with Dr Garvin, to be done tomorrow, cancelled transthoracic echo  teg reviewed given 1 ffp and 1 prbc    Review of Systems  Review of Systems   Unable to perform ROS: Intubated        Vital Signs for last 24 hours   Temp:  [38 °C (100.4 °F)-38.1 °C (100.6 °F)] 38 °C (100.4 °F)  Pulse:  [] 113  Resp:  [20-60] 20  BP: ()/() 93/49  SpO2:  [51 %-100 %] 98 %    Hemodynamic parameters for last 24 hours       Respiratory Information for the last 24 hours  Vent Mode: APVCMV  Rate (breaths/min): 20  Vt Target (mL): 320  PEEP/CPAP: 8  MAP: 13  Control VTE (exp VT): 326    Physical Exam   Physical Exam  Vitals and nursing note reviewed.   Constitutional:       Appearance: She is ill-appearing.   HENT:      Head: Normocephalic and atraumatic.      Right Ear: External ear normal.      Left Ear: External ear normal.      Nose: Nose normal.      Mouth/Throat:      Mouth: Mucous membranes are moist.   Eyes:      Pupils: Pupils are equal, round, and reactive to light.   Cardiovascular:      Rate and Rhythm: Normal rate and regular rhythm.      Pulses: Normal pulses.      Heart sounds: Murmur present. No gallop.       Comments: R sided chest tube with large thrombus  Pulmonary:      Comments: Equal and symmetric breath sounds to mechanical ventilation  Abdominal:      General: Abdomen is flat. There is no distension.      Palpations: Abdomen is soft.      Tenderness: There is no abdominal tenderness. There is no guarding or rebound.   Musculoskeletal:      Cervical back: No rigidity.      Right lower leg: No edema.      Left lower leg: No edema.   Lymphadenopathy:      Cervical: No cervical adenopathy.   Skin:     General: Skin is warm and dry.      Capillary Refill: Capillary refill takes less than 2 seconds.   Neurological:      Comments: Grimaces, moves all 4  extremities         Medications  Current Facility-Administered Medications   Medication Dose Route Frequency Provider Last Rate Last Admin   • potassium bicarbonate (KLYTE) effervescent tablet 25 mEq  25 mEq Enteral Tube Q4HRS Viraj Burgos M.D.       • piperacillin-tazobactam (ZOSYN) 4.5 g in  mL IVPB  4.5 g Intravenous Q6HRS F John Rees M.D. 25 mL/hr at 04/23/21 0520 4.5 g at 04/23/21 0520   • propofol (DIPRIVAN) injection  0-80 mcg/kg/min Intravenous Continuous Viraj Burgos M.D. 12.3 mL/hr at 04/23/21 0632 25 mcg/kg/min at 04/23/21 0632   • furosemide (LASIX) injection 40 mg  40 mg Intravenous Q DAY Viraj Burgos M.D.   40 mg at 04/23/21 0525   • fentaNYL (SUBLIMAZE) 50 mcg/mL in 50mL   Intravenous Continuous Viraj Burgos M.D. 7 mL/hr at 04/23/21 0207 350 mcg/hr at 04/23/21 0207   • methylPREDNISolone (SOLU-MEDROL) 40 MG injection 40 mg  40 mg Intravenous Q6HRS Viraj Burgos M.D.   40 mg at 04/23/21 0525   • Pharmacy Consult: Enteral tube insertion - review meds/change route/product selection  1 Each Other PHARMACY TO DOSE Elliott Salvador M.D.       • acetaminophen (Tylenol) tablet 650 mg  650 mg Enteral Tube Q4HRS PRN Elliott Salvador M.D.   650 mg at 04/22/21 0904   • magnesium hydroxide (MILK OF MAGNESIA) suspension 30 mL  30 mL Enteral Tube QDAY PRN Elliott Salvador M.D.        And   • senna-docusate (PERICOLACE or SENOKOT S) 8.6-50 MG per tablet 2 tablet  2 tablet Enteral Tube BID Elliott Salvador M.D.   2 tablet at 04/23/21 0531    And   • polyethylene glycol/lytes (MIRALAX) PACKET 1 Packet  1 Packet Enteral Tube QDAY PRN Elliott Salvador M.D.        And   • bisacodyl (DULCOLAX) suppository 10 mg  10 mg Rectal QDAY PRN Elliott Salvador M.D.       • divalproex (DEPAKOTE SPRINKLE) capsule 500 mg  500 mg Enteral Tube Q8HRS Elliott Salvador M.D.   500 mg at 04/23/21 0532   • DULoxetine (CYMBALTA) capsule 60 mg  60 mg Enteral Tube BID Elliott Salvador M.D.   60 mg at 04/23/21 0531   •  PARoxetine (PAXIL) tablet 10 mg  10 mg Enteral Tube QAM Elliott Salvador M.D.   10 mg at 04/23/21 0529   • risperiDONE (RISPERDAL) tablet 2 mg  2 mg Enteral Tube BID Elliott Salvador M.D.   2 mg at 04/23/21 0530   • Respiratory Therapy Consult   Nebulization Continuous RT Man Wahl M.D.       • famotidine (PEPCID) tablet 20 mg  20 mg Enteral Tube Q12HRS Man Wahl M.D.   20 mg at 04/22/21 1718    Or   • famotidine (PEPCID) injection 20 mg  20 mg Intravenous Q12HRS Man Wahl M.D.   20 mg at 04/23/21 0527   • MD Alert...ICU Electrolyte Replacement per Pharmacy   Other PHARMACY TO DOSE Man Wahl M.D.       • lidocaine (XYLOCAINE) 1 % injection 1-2 mL  1-2 mL Tracheal Tube Q30 MIN PRN Man Wahl M.D.       • ipratropium-albuterol (DUONEB) nebulizer solution  3 mL Nebulization Q2HRS PRN (RT) Man Wahl M.D.       • fentaNYL (SUBLIMAZE) injection 25 mcg  25 mcg Intravenous Q HOUR PRN Man Wahl M.D.        Or   • fentaNYL (SUBLIMAZE) injection 50 mcg  50 mcg Intravenous Q HOUR PRN Man Wahl M.D.        Or   • fentaNYL (SUBLIMAZE) injection 100 mcg  100 mcg Intravenous Q HOUR PRN Man Wahl M.D.   100 mcg at 04/17/21 0523   • norepinephrine (Levophed) infusion 8 mg/250 mL (premix)  0-30 mcg/min Intravenous Continuous Man Wahl M.D.   Stopped at 04/23/21 0648       Fluids    Intake/Output Summary (Last 24 hours) at 4/23/2021 0653  Last data filed at 4/23/2021 0600  Gross per 24 hour   Intake 4644.11 ml   Output 1960 ml   Net 2684.11 ml       Laboratory  Recent Labs     04/22/21  0830   ISTATAPH 7.534*   ISTATAPCO2 36.2   ISTATAPO2 63*   ISTATATCO2 32   [id number] 94   ISTATARTHCO3 30.5*   ISTATARTBE 7*   ISTATTEMP 38.5 C   ISTATFIO2 30   ISTATSPEC Arterial   ISTATAPHTC 7.511*   [id number] 70         Recent Labs     04/21/21  0320 04/21/21  0320 04/22/21  0455 04/22/21  1146 04/23/21  0518   SODIUM 127*  --  133*  --  134*   POTASSIUM  3.9   < > 3.4* 3.5* 3.3*   CHLORIDE 92*  --  97  --  100   CO2 28  --  28  --  27   BUN 21  --  20  --  17   CREATININE 0.82  --  0.53  --  0.47*   CALCIUM 7.2*  --  7.3*  --  7.7*    < > = values in this interval not displayed.     Recent Labs     04/21/21  0320 04/22/21  0455 04/23/21  0518   GLUCOSE 119* 117* 85     Recent Labs     04/21/21  0320 04/21/21  0320 04/22/21  0455 04/22/21  1146 04/23/21  0518   WBC 32.2*   < > 21.5* 20.3* 21.4*   NEUTSPOLYS 85.50*  --  82.50*  --  83.00*   LYMPHOCYTES 3.50*  --  5.80*  --  5.90*   MONOCYTES 5.40  --  7.80  --  8.80   EOSINOPHILS 0.00  --  0.00  --  0.00   BASOPHILS 0.20  --  0.30  --  0.60    < > = values in this interval not displayed.     Recent Labs     04/21/21 0320 04/21/21  1500 04/22/21  0455 04/22/21  0455 04/22/21  0849 04/22/21  1146 04/23/21  0518   RBC 2.62*  --  2.50*  --   --  2.34* 2.32*   HEMOGLOBIN 7.8*   < > 7.4*   < > 6.8* 6.8* 7.1*   HEMATOCRIT 22.0*  --  21.6*  --   --  20.2* 21.0*   PLATELETCT 476*  --  529*  --   --  525* 507*   PROTHROMBTM 15.7*  --   --   --   --   --   --    INR 1.21*  --   --   --   --   --   --     < > = values in this interval not displayed.       Imaging  X-Ray:  I have personally reviewed the images and compared with prior images.  CT:    Reviewed  Echo:   Reviewed    Assessment/Plan  * Septic shock (HCC)- (present on admission)  Assessment & Plan  This is Septic shock Present on admission  SIRS criteria identified on my evaluation include: Tachypnea, with respirations greater than 20 per minute and Leukocytosis, with WBC greater than 12,000  Source is right anterior chest port, MSSA  Suspected onset of infection unknown  Patient developed hypotension at transferring facility [facility], resuscitation complete there, currently titrating norepinephrine/vasopressin MAP > 65  IV antibiotics as appropriate for source of sepsis  Reassessment: I have reassessed the patient's hemodynamic status    MSSA endocarditis + B/L pleural  empyema, cavitary pneumonia and bacteremia   Cefepime, extended course  Negative cultures since 4/17  Replace central line 4/21  Nataly ID recommendations appreciated  Bal 4/20 klebsiella  Worsening cavitary lesions on ct imaging  US positive for fluid near pain stimulator, Dr Vargas consulted for removal, too high risk   MICAH 4/23 with mitral and tricuspid vegetations    Empyema of right pleural space (HCC)- (present on admission)  Assessment & Plan  Right-sided effusion associated with infected catheter right anterior chest and septic emboli, possible empyema  Status post chest tube placement 4/16  At [facility] complicated by some bleeding  Replaced 4/18 Dr Benedict  Surgery following, high risk for surgical intervention  Tolerated 4 days tpa/dornase then required mult transfusions and more gross blood than blood w/ chunks of loculations at that point  Hydropneumothorax  Likely needs surgery but high risk for surgery at this point    Acute bacterial endocarditis- (present on admission)  Assessment & Plan  Cultures growing MSSA at [facility]  Infected right anterior chest port/line removed  CTA chest 4/15 with multiple septic emboli worse on the right complicated by large right effusion with loculations  Echocardiogram 4/12 reveals echogenic mobile lesion on tricuspid valve RVSP 30, EF 55%  Extended antibiotics  MICAH on 4/23 with mitral and tricuspid vegetations    Acute respiratory failure with hypoxia (HCC)- (present on admission)  Assessment & Plan  Intubated 4/15 for respiratory failure at [facility]  Lung protective ventilation strategies  Optimize oxygenation, ventilation, and acid base balance.   ABCEDF bundle  SAT/SBT   Chest tube to -20 cmH2O suction  4 doses TPA/dornase, poor tolerance but adequate effect  Hydropneumothorax rt side, left sided chest tube pending placement  Worsening cavitary lesions on CT imaging on 4/21    Acute encephalopathy- (present on admission)  Assessment & Plan  Multifactorial including septic and  metabolic  Status post lumbar puncture with cytosis but negative Gram stain and cultures NGTD   shunt for pseudotumor cerebri  Not candidate for MRI  Ct repeat no intracranial abnormalities on 4/21  EEG with encephalopathy and no seizures    Pneumonia  Assessment & Plan  MSSA septic emboli with empyema   BAL 4/20 klebsiella pneum  On cefepime    Bacteremia due to methicillin susceptible Staphylococcus aureus (MSSA)- (present on admission)  Assessment & Plan  Source presumed right anterior chest line/port with endocarditis  Recurrent line/port infection  Blood cultures positive for MSSA  CT imaging suggestive of empyema and septic emboli    Repeat blood cultures every 48 hours until negative  Cefepime, extended course. ID on board  Dr Vargas consulted for removal of pain stimulator, too unstable  MICAH positive for mitral and tricuspid vegetations on 4/23      CSF pleocytosis- (present on admission)  Assessment & Plan  Status post lumbar puncture 4/13  WBC 53, Glucose 47, protein 97 Gram stain negative and culture negative so far  Neurosurgery aware,  shunt in place  MRI brain requested by neurosurgery at [facility], not candidate    Repeat ct no embolic lesions  Abx changed to cefepime for CNS penetration    Acute blood loss anemia- (present on admission)  Assessment & Plan  Hemoglobin dropped from 8.3->6.4 on 4/16, repeat hemoglobin pending  Status post transfusion 1 unit of blood 4/16 at [facility]  Q12 hour H/H  Conservative transfusion goal to hemoglobin > 7  Reverse coagulopathy   prbc given again 4/20, 2 Unit required  Stopped TPA/dornase after 4 doses    Thrombocytopenia (HCC)- (present on admission)  Assessment & Plan  Sepsis induced  TEG adequate plt function    S/P  shunt- (present on admission)  Assessment & Plan  History of pseudotumor cerebri  History of  shunt by Dr. Oh  MRI brain pending, ordered 4/14 by Dr Oh; awaiting MRI compatibility clarification  4/14 lumbar puncture did reveal pleocytosis but  Gram stain negative cultures NGTD  Follow CSF cultures, ID following  Repeat CT no embolic lesions    Empyema of left pleural space (HCC)- (present on admission)  Assessment & Plan  Empyema, loculated  New on repeat ct imaging  IR guided chest tube on 4/23  TPA/dornase planned for 4/24    History of vasculitis- (present on admission)  Assessment & Plan  Monitor    Prolonged Q-T interval on ECG- (present on admission)  Assessment & Plan  Avoid QT prolonging agents as able  Optimize electrolytes  Cardiac monitoring  EKG    History of complicated migraines- (present on admission)  Assessment & Plan  Resume home regimen when clinically appropriate    H/O: CVA (cerebrovascular accident)- (present on admission)  Assessment & Plan  Monitor    Chronic pain syndrome- (present on admission)  Assessment & Plan  Chronic back pain  History of nerve stimulator   Placed in rich  Dr Vargas consulted, no plan to remove stimulator       VTE:  Heparin SQ  Ulcer: H2 Antagonist  Lines: Central Line  Ongoing indication addressed, Arterial Line  Ongoing indication addressed and Lopez Catheter  Ongoing indication addressed    I have performed a physical exam and reviewed and updated ROS and Plan today (4/23/2021). In review of yesterday's note (4/22/2021), there are no changes except as documented above.     Titrating ventilator, sedation and ongoing evaluation of endocarditis and encephalopathy. This patient is critically ill, at high risk for decompensation leading to worsening vital organ dysfunction and death without critical care interventions.    Discussed patient condition and risk of morbidity and/or mortality with Family, RN, RT, Pharmacy, Dietary, Code status disscussed, Charge nurse / hot rounds and cardiology.      The patient remains critically ill.  Critical care time = 79 minutes in directly providing and coordinating critical care and extensive data review.  No time overlap and excludes procedures

## 2021-04-23 NOTE — DISCHARGE PLANNING
Care Transition Team Discharge Planning    Anticipated Discharge Disposition: TBD    Action: Per AM rounds, pt is NPO d/t chest tube placement today. Pt's stimulator was scanned, and there is no fluid around. Pt is being titrated off sedation. Pt is currently not withdrawing from stimuli or moving extremities. Pt's nevarez has output. Pt is on vent day 6.       Barriers to Discharge: medical clearance/stability    Plan: Lsw will continue to follow, attend rounds for medical updates, and assist w/ d/c planning.

## 2021-04-23 NOTE — PROCEDURES
ROUTINE ELECTROENCEPHALOGRAM REPORT      Referring provider: Dr. Benoit Loya DO    DOS: 4/23/21 (total recording of 30 minutes)    INDICATION:  Enedelia Pang 50 y.o. female presenting with sepsis, endocarditis, altered mental status.     CURRENT ANTIEPILEPTIC REGIMEN: N/A    TECHNIQUE: 30 channel routine electroencephalogram (EEG) was performed in accordance with the international 10-20 system. The study was reviewed in bipolar and referential montages. The recording examined the patient during wakeful and drowsy state.     DESCRIPTION OF THE RECORD:  The EEG in this intubated patient shows a 5-6 Hz activity over the posterior head regions. There is presence of generalized slowing with 4-5 Hz theta. Frequent triphasic waves with AP lag are noted throughout the study with a frequency of 1-2 Hz. There is reactivity in the tracing. Propofol was restarted at 3:55pm due to increases in heart rate and blood pressure, at which time the triphasic waves became ubiquitous with a frequency of 1-1.5 Hz and with loss of faster frequencies on EEG. No epileptiform discharges or subclinical seizures were seen.      ACTIVATION PROCEDURES:   None performed.     EKG: sampling of the EKG recording demonstrated sinus tachycardia.       INTERPRETATION:  This is an abnormal EEG in an intubated and subsequently sedated patient due to a moderate degree of slowing of the posterior dominant rhythm and a moderate degree of generalized slowing with frequent triphasic waves. These findings correlate with a moderate encephalopathy of nonspecific etiology. Upon initiation of Propofol triphasic waves became ubiquitous with a frequency of 1-1.5 Hz and likely represented worsening encephalopathy due to medication effect and emergence of anesthetic pattern. Overall, no epileptiform discharges or subclinical seizures were seen during this tracing.     Curtis Jenkins M.D., Diplomat of the American Board of Psychiatry and Neurology  Diplomat  of [employer] Epilepsy Subspecialty   Assistant Clinical Professor, [facility] Neurology Consultant

## 2021-04-23 NOTE — PROCEDURES
Patient was brought to cath lab holding.  Consent was obtained. Risks and benefits of procedures were explained.    Moderate sedation was used for sedation process.    Indication: to evaluate for endocarditis.    Complication: none    Diagnosis: no evidence of left atrial appendage thrombus. Large vegetations seen on tricuspid valve and mitral valve leaflets. Mild TR and MR. No evidence of aortic root abscess.    Lisa Garvin MD.

## 2021-04-23 NOTE — RESPIRATORY CARE
Ventilator Daily Summary    Vent Day # 8,     Ventilator settings changed this shift: AC 20, Vt 320, Peep 8, Fio2 30%    Weaning trials: Pt. Failed times 2 SBT trials    Respiratory Procedures:    Plan: Continue current ventilator settings and wean mechanical ventilation as tolerated per physician orders.

## 2021-04-23 NOTE — CARE PLAN
Problem: Safety - Medical Restraint  Goal: Remains free of injury from restraints (Restraint for Interference with Medical Device)  Description: INTERVENTIONS:  1. Determine that other, less restrictive measures have been tried or would not be effective before applying the restraint  2. Evaluate the patient's condition at the time of restraint application  3. Inform patient/family regarding the reason for restraint  4. Q2H: Monitor safety, psychosocial status, comfort, nutrition and hydration  Outcome: PROGRESSING AS EXPECTED     Problem: Respiratory:  Goal: Respiratory status will improve  Outcome: PROGRESSING SLOWER THAN EXPECTED

## 2021-04-23 NOTE — HOSPITAL COURSE
"Chief Complaint  \"50 y.o. female the past medical history of pseudotumor cerebri status post  shunt by Dr. Oh, chronic pain with history of placement of nerve stimulator, vasculitis, right anterior chest port for home IV meds with recurrent infection who presented 4/11/2021 with to [facility] with recurrent port infection. Found to have MSSA bacteremia, endocarditis, septic pulmonary emboli/empyema/pneumatocele, and CSF pleocytosis concerning for  shunt involvement.  Seen by Dr. Oh, NSGY, at [facility] who did not recommend  shunt removal.  Seen by Dr. Vargas here for concern of fluid collection around her spinal stimulator and he recommended against removal. Remains intubated, encephalopathic.\"     Hospital Course  4/16 admitted to ICU  4/17 VD 2, 2 FFP, pRBC overnight; new chest tube per gen surg  4/18 VD 3, TPa/Dornase with 1400 cc from R chest tube  4/26 R chest tube not functioning, d/c it.  broke his leg and going to OR today, so not available currently.  VD 11.  8/30%. RSBI immediately high on SBT attempt. Followed commands for RN  4/27 VD12. 8/30%. Not tolerating wean, high RSBI and tachycardic.   4/28 VD 13. VATS with Dr. Ganser.    4/29 VD 14. 8/30%. Still not tolerating wean d/t severe tachycardia just with SAT. Trialed precedex and she required fentanyl up to 600/hr to tolerate, so back on propofol. Chest tubes -40  4/30 VD 15. 8/30%. Try ketamine/versed as sedative. Severe tachycardia and HTN with weaning down propofol. Plan for perc trach tomorrow. Chest tubes -40  5/1 perc trach. PICC placed. Chest tubes remain -40.  5/2 Able to spont well on sedation, but very tachy/HTN when sedation is turned down.  5/3 midazolam weaned down to 3mg/min  5/4 off versed and ketamine gtt  5/5 trials of SBT w/ trach  5/6 T piece trial during day, mobilize to chair x 2, rest vent 8/8 at night, started methadone, gabapentin   5/7 T piece during day with, rest PS 8/8 overnight, spacing out dilaudid PRNs  5/8 " T-piece during day, still with anxiety weaning dex gtt  5/10 CT chest  5/11 will require thoracotomy and decortication  5/12 off ventilator x 24 hours  5/13 replete magnesium, remains on T-piece, ambulating, transfer to [facility]/surg

## 2021-04-23 NOTE — RESPIRATORY CARE
Ventilator Daily Summary    Vent Day # 7     Ventilator settings changed this shift: 20 320 +8 30%    Weaning trials: none, failed SAT due to agitation      Respiratory Procedures: pending IR chest tube     Plan: Continue current ventilator settings and wean mechanical ventilation as tolerated per physician orders.

## 2021-04-23 NOTE — DIETARY
"Nutrition Services: Weekly TF update  Day 7 of admit.  Enedelia Pang is a 50 y.o. female with admitting DX of Empyema, sepsis due to port line infection, endocarditis    TF last running @ 2200 on 4/21. TF has since been held for procedures.  MICAH performed today: \"Overall, patient appears to be poor candidate for any open heart procedure at this time. She does appear to have relatively poor prognosis overall.\"    Assessment:  Height: 160 cm (5' 2.99\")  Weight: 82.6 kg (182 lb 1.6 oz)  Weight to Use in Calculations: 75 kg (165 lb 5.5 oz)-- Initial weight, now +2.2 L fluid per I/O; BMI 29.29   Ideal Body Weight: 52.2 kg (115 lb)    Estimated needs:  REE per MSJ x1.1-1.2 = 6737-8760 kcal/day  RMR per [facility] (vent L/min 7.2, T max/24 hours 38.2) = 1686 kcal/day  7329-7295 kcal/day = 20-23 kcal/day  1.2-1.5 grams protein/kg =  grams protein/day     Evaluation:   1. Pt remains on vent support, day 8.  2. Reviewed labs.   3. Meds include lasix, electrolyte replacement, propofol @ 20 mcg/kg/min (9.9 ml/hr = 261 kcal/day), and bowel regimen.  4. BM today.  5. No wounds noted.  6. Low-volume, high-protein TF formula is most appropriate to meet nutritional needs.     Malnutrition Risk: None identified @ this time.     Recommendations/Plan:  1. With propofol: Impact Peptide 1.5 @ 40 ml/hr to provide 1440 kcal (+Kcal from propofol), 90 grams protein, and 739 ml free water per day.  2. Without propofol: Impact Peptide 1.5 @ 45 ml/hr to provide 1620 kcal, 102 grams protein, and 832 ml free water per day.  3. Fluids per MD.    RD following.   "

## 2021-04-24 ENCOUNTER — APPOINTMENT (OUTPATIENT)
Dept: RADIOLOGY | Facility: MEDICAL CENTER | Age: 51
DRG: 003 | End: 2021-04-24
Attending: INTERNAL MEDICINE
Payer: MEDICARE

## 2021-04-24 PROBLEM — J98.11 ATELECTASIS: Status: ACTIVE | Noted: 2021-04-24

## 2021-04-24 LAB
ANION GAP SERPL CALC-SCNC: 8 MMOL/L (ref 7–16)
BASOPHILS # BLD AUTO: 0.2 % (ref 0–1.8)
BASOPHILS # BLD: 0.03 K/UL (ref 0–0.12)
BUN SERPL-MCNC: 18 MG/DL (ref 8–22)
CALCIUM SERPL-MCNC: 8 MG/DL (ref 8.5–10.5)
CHLORIDE SERPL-SCNC: 94 MMOL/L (ref 96–112)
CO2 SERPL-SCNC: 26 MMOL/L (ref 20–33)
CREAT SERPL-MCNC: 0.4 MG/DL (ref 0.5–1.4)
EOSINOPHIL # BLD AUTO: 0 K/UL (ref 0–0.51)
EOSINOPHIL NFR BLD: 0 % (ref 0–6.9)
ERYTHROCYTE [DISTWIDTH] IN BLOOD BY AUTOMATED COUNT: 57.1 FL (ref 35.9–50)
GLUCOSE SERPL-MCNC: 111 MG/DL (ref 65–99)
GRAM STN SPEC: NORMAL
GRAM STN SPEC: NORMAL
HCT VFR BLD AUTO: 23.8 % (ref 37–47)
HGB BLD-MCNC: 7.3 G/DL (ref 12–16)
HGB BLD-MCNC: 7.8 G/DL (ref 12–16)
IMM GRANULOCYTES # BLD AUTO: 0.15 K/UL (ref 0–0.11)
IMM GRANULOCYTES NFR BLD AUTO: 0.9 % (ref 0–0.9)
LYMPHOCYTES # BLD AUTO: 0.86 K/UL (ref 1–4.8)
LYMPHOCYTES NFR BLD: 5.2 % (ref 22–41)
MCH RBC QN AUTO: 30.6 PG (ref 27–33)
MCHC RBC AUTO-ENTMCNC: 32.8 G/DL (ref 33.6–35)
MCV RBC AUTO: 93.3 FL (ref 81.4–97.8)
MONOCYTES # BLD AUTO: 1.27 K/UL (ref 0–0.85)
MONOCYTES NFR BLD AUTO: 7.7 % (ref 0–13.4)
NEUTROPHILS # BLD AUTO: 14.27 K/UL (ref 2–7.15)
NEUTROPHILS NFR BLD: 86 % (ref 44–72)
NRBC # BLD AUTO: 0 K/UL
NRBC BLD-RTO: 0 /100 WBC
PLATELET # BLD AUTO: 505 K/UL (ref 164–446)
PMV BLD AUTO: 9 FL (ref 9–12.9)
POTASSIUM SERPL-SCNC: 2.9 MMOL/L (ref 3.6–5.5)
POTASSIUM SERPL-SCNC: 3.3 MMOL/L (ref 3.6–5.5)
POTASSIUM SERPL-SCNC: 3.9 MMOL/L (ref 3.6–5.5)
RBC # BLD AUTO: 2.55 M/UL (ref 4.2–5.4)
RHODAMINE-AURAMINE STN SPEC: NORMAL
SIGNIFICANT IND 70042: NORMAL
SITE SITE: NORMAL
SODIUM SERPL-SCNC: 128 MMOL/L (ref 135–145)
SOURCE SOURCE: NORMAL
TRIGL SERPL-MCNC: 194 MG/DL (ref 0–149)
WBC # BLD AUTO: 16.6 K/UL (ref 4.8–10.8)

## 2021-04-24 PROCEDURE — A9270 NON-COVERED ITEM OR SERVICE: HCPCS | Performed by: INTERNAL MEDICINE

## 2021-04-24 PROCEDURE — 94799 UNLISTED PULMONARY SVC/PX: CPT

## 2021-04-24 PROCEDURE — 0B938ZZ DRAINAGE OF RIGHT MAIN BRONCHUS, VIA NATURAL OR ARTIFICIAL OPENING ENDOSCOPIC: ICD-10-PCS | Performed by: INTERNAL MEDICINE

## 2021-04-24 PROCEDURE — 700102 HCHG RX REV CODE 250 W/ 637 OVERRIDE(OP): Performed by: INTERNAL MEDICINE

## 2021-04-24 PROCEDURE — 85025 COMPLETE CBC W/AUTO DIFF WBC: CPT

## 2021-04-24 PROCEDURE — 0B978ZZ DRAINAGE OF LEFT MAIN BRONCHUS, VIA NATURAL OR ARTIFICIAL OPENING ENDOSCOPIC: ICD-10-PCS | Performed by: INTERNAL MEDICINE

## 2021-04-24 PROCEDURE — 87077 CULTURE AEROBIC IDENTIFY: CPT | Mod: 91

## 2021-04-24 PROCEDURE — 87070 CULTURE OTHR SPECIMN AEROBIC: CPT

## 2021-04-24 PROCEDURE — 87015 SPECIMEN INFECT AGNT CONCNTJ: CPT

## 2021-04-24 PROCEDURE — 31624 DX BRONCHOSCOPE/LAVAGE: CPT | Performed by: INTERNAL MEDICINE

## 2021-04-24 PROCEDURE — 94003 VENT MGMT INPAT SUBQ DAY: CPT

## 2021-04-24 PROCEDURE — 0B968ZZ DRAINAGE OF RIGHT LOWER LOBE BRONCHUS, VIA NATURAL OR ARTIFICIAL OPENING ENDOSCOPIC: ICD-10-PCS | Performed by: INTERNAL MEDICINE

## 2021-04-24 PROCEDURE — 700105 HCHG RX REV CODE 258: Performed by: INTERNAL MEDICINE

## 2021-04-24 PROCEDURE — 87116 MYCOBACTERIA CULTURE: CPT

## 2021-04-24 PROCEDURE — 700101 HCHG RX REV CODE 250: Performed by: INTERNAL MEDICINE

## 2021-04-24 PROCEDURE — 0B9D8ZZ DRAINAGE OF RIGHT MIDDLE LUNG LOBE, VIA NATURAL OR ARTIFICIAL OPENING ENDOSCOPIC: ICD-10-PCS | Performed by: INTERNAL MEDICINE

## 2021-04-24 PROCEDURE — 99291 CRITICAL CARE FIRST HOUR: CPT | Mod: 25 | Performed by: INTERNAL MEDICINE

## 2021-04-24 PROCEDURE — 700111 HCHG RX REV CODE 636 W/ 250 OVERRIDE (IP): Performed by: INTERNAL MEDICINE

## 2021-04-24 PROCEDURE — 302978 HCHG BRONCHOSCOPY-DIAGNOSTIC

## 2021-04-24 PROCEDURE — 84132 ASSAY OF SERUM POTASSIUM: CPT

## 2021-04-24 PROCEDURE — 87206 SMEAR FLUORESCENT/ACID STAI: CPT

## 2021-04-24 PROCEDURE — 31645 BRNCHSC W/THER ASPIR 1ST: CPT | Performed by: INTERNAL MEDICINE

## 2021-04-24 PROCEDURE — 0B9F8ZX DRAINAGE OF RIGHT LOWER LUNG LOBE, VIA NATURAL OR ARTIFICIAL OPENING ENDOSCOPIC, DIAGNOSTIC: ICD-10-PCS | Performed by: INTERNAL MEDICINE

## 2021-04-24 PROCEDURE — 770022 HCHG ROOM/CARE - ICU (200)

## 2021-04-24 PROCEDURE — 87186 SC STD MICRODIL/AGAR DIL: CPT

## 2021-04-24 PROCEDURE — 0B918ZZ DRAINAGE OF TRACHEA, VIA NATURAL OR ARTIFICIAL OPENING ENDOSCOPIC: ICD-10-PCS | Performed by: INTERNAL MEDICINE

## 2021-04-24 PROCEDURE — 85018 HEMOGLOBIN: CPT

## 2021-04-24 PROCEDURE — 80048 BASIC METABOLIC PNL TOTAL CA: CPT

## 2021-04-24 PROCEDURE — 71045 X-RAY EXAM CHEST 1 VIEW: CPT

## 2021-04-24 PROCEDURE — 84478 ASSAY OF TRIGLYCERIDES: CPT

## 2021-04-24 PROCEDURE — 87205 SMEAR GRAM STAIN: CPT

## 2021-04-24 RX ORDER — POTASSIUM CHLORIDE 14.9 MG/ML
20 INJECTION INTRAVENOUS ONCE
Status: COMPLETED | OUTPATIENT
Start: 2021-04-24 | End: 2021-04-24

## 2021-04-24 RX ORDER — POTASSIUM CHLORIDE 7.45 MG/ML
10 INJECTION INTRAVENOUS ONCE
Status: COMPLETED | OUTPATIENT
Start: 2021-04-24 | End: 2021-04-24

## 2021-04-24 RX ORDER — POTASSIUM CHLORIDE 29.8 MG/ML
40 INJECTION INTRAVENOUS ONCE
Status: COMPLETED | OUTPATIENT
Start: 2021-04-24 | End: 2021-04-24

## 2021-04-24 RX ADMIN — PAROXETINE HYDROCHLORIDE 10 MG: 20 TABLET, FILM COATED ORAL at 05:15

## 2021-04-24 RX ADMIN — POTASSIUM CHLORIDE 10 MEQ: 7.46 INJECTION, SOLUTION INTRAVENOUS at 18:22

## 2021-04-24 RX ADMIN — CEFEPIME 1 G: 1 INJECTION, POWDER, FOR SOLUTION INTRAMUSCULAR; INTRAVENOUS at 05:41

## 2021-04-24 RX ADMIN — RISPERIDONE 2 MG: 1 TABLET, FILM COATED ORAL at 17:47

## 2021-04-24 RX ADMIN — PROPOFOL 30 MCG/KG/MIN: 10 INJECTION, EMULSION INTRAVENOUS at 10:38

## 2021-04-24 RX ADMIN — Medication 100 MCG/HR: at 13:20

## 2021-04-24 RX ADMIN — DIVALPROEX SODIUM 500 MG: 125 CAPSULE ORAL at 13:02

## 2021-04-24 RX ADMIN — DIVALPROEX SODIUM 500 MG: 125 CAPSULE ORAL at 22:01

## 2021-04-24 RX ADMIN — DIVALPROEX SODIUM 500 MG: 125 CAPSULE ORAL at 05:16

## 2021-04-24 RX ADMIN — PROPOFOL 40 MCG/KG/MIN: 10 INJECTION, EMULSION INTRAVENOUS at 17:47

## 2021-04-24 RX ADMIN — POTASSIUM CHLORIDE 20 MEQ: 14.9 INJECTION, SOLUTION INTRAVENOUS at 14:30

## 2021-04-24 RX ADMIN — PROPOFOL 50 MCG/KG/MIN: 10 INJECTION, EMULSION INTRAVENOUS at 21:18

## 2021-04-24 RX ADMIN — DORNASE ALFA 5 MG: 1 SOLUTION RESPIRATORY (INHALATION) at 14:19

## 2021-04-24 RX ADMIN — POTASSIUM CHLORIDE 40 MEQ: 29.8 INJECTION, SOLUTION INTRAVENOUS at 09:22

## 2021-04-24 RX ADMIN — DULOXETINE HYDROCHLORIDE 60 MG: 60 CAPSULE, DELAYED RELEASE ORAL at 17:47

## 2021-04-24 RX ADMIN — PROPOFOL 40 MCG/KG/MIN: 10 INJECTION, EMULSION INTRAVENOUS at 05:15

## 2021-04-24 RX ADMIN — FAMOTIDINE 20 MG: 20 TABLET ORAL at 17:47

## 2021-04-24 RX ADMIN — CEFEPIME 1 G: 1 INJECTION, POWDER, FOR SOLUTION INTRAMUSCULAR; INTRAVENOUS at 13:01

## 2021-04-24 RX ADMIN — FAMOTIDINE 20 MG: 20 TABLET ORAL at 05:16

## 2021-04-24 RX ADMIN — RISPERIDONE 2 MG: 1 TABLET, FILM COATED ORAL at 05:15

## 2021-04-24 RX ADMIN — PROPOFOL 50 MCG/KG/MIN: 10 INJECTION, EMULSION INTRAVENOUS at 00:08

## 2021-04-24 RX ADMIN — FUROSEMIDE 40 MG: 10 INJECTION, SOLUTION INTRAMUSCULAR; INTRAVENOUS at 05:16

## 2021-04-24 RX ADMIN — ACETAMINOPHEN 650 MG: 325 TABLET, FILM COATED ORAL at 13:01

## 2021-04-24 RX ADMIN — FENTANYL CITRATE 100 MCG: 50 INJECTION, SOLUTION INTRAMUSCULAR; INTRAVENOUS at 22:02

## 2021-04-24 RX ADMIN — ALTEPLASE 5 MG: KIT at 14:19

## 2021-04-24 RX ADMIN — ENOXAPARIN SODIUM 40 MG: 40 INJECTION SUBCUTANEOUS at 13:02

## 2021-04-24 RX ADMIN — DULOXETINE HYDROCHLORIDE 60 MG: 60 CAPSULE, DELAYED RELEASE ORAL at 05:16

## 2021-04-24 RX ADMIN — CEFEPIME 1 G: 1 INJECTION, POWDER, FOR SOLUTION INTRAMUSCULAR; INTRAVENOUS at 22:02

## 2021-04-24 ASSESSMENT — PAIN DESCRIPTION - PAIN TYPE
TYPE: ACUTE PAIN

## 2021-04-24 ASSESSMENT — FIBROSIS 4 INDEX: FIB4 SCORE: 0.38

## 2021-04-24 NOTE — CARE PLAN
Problem: Ventilation Defect:  Goal: Ability to achieve and maintain unassisted ventilation or tolerate decreased levels of ventilator support  Outcome: PROGRESSING AS EXPECTED     Problem: Mechanical Ventilation  Goal: Safe management of artificial airway and ventilation  Outcome: PROGRESSING AS EXPECTED                                 Ventilator Daily Summary     Vent Day # 9     Ventilator settings changed this shift: Fio2 35%     Weaning trials: On Hold     Respiratory Procedures: none     Plan: Continue current ventilator settings and wean mechanical ventilation as tolerated per physician orders.

## 2021-04-24 NOTE — CONSULTS
"Reason for PC Consult: Advance Care Planning    Consulted by: Dr Loya    Assessment:  General: 51 yo female transferred from [facility].   Pt with hx of pseudotumor cerebri s/p  shunt with port for chronic pain management. Pt has had complicated recurrent port infections with multiple port replacements.   Pt admitted to [facility] 4/11 MSSA bacteremia secondary to port infection-port removed 4/12 and being seen by Nataly LEIGH. Pt developed tricuspid valve endocarditis and encephalopath. Pt had LP 4/14 which was not significant. Chest CT on 4/15 showed septic emboli in bilateral lungs with left lung having an associated large effusion, ultrasound showed multiple loculations and evidence of empyema. Pts hgb dropped and she developed respiratory distress and required intubation.   Pt transferred to [facility] to be evaluated by thoracici surgery.     Social: Pt lives with her  who is her main caregiver. They have six children, two live in [place] while the others live in [place], [place] and [place] (for a summer job).     Consults:  Critical Care  General Surgery  Neurosurgery   Cardiology  Palliative Care     Dyspnea: Yes- intubated  Last BM: 04/24/21-    Pain: Unable to determine-    Depression: Unable to determine-    Dementia: Unable to Determine;       Spiritual:  Is [employer]ism or spirituality important for coping with this illness? Yes- Spoue, Benoit, is a retired Jehov[employer]'s witness    Has a  or spiritual provider visit been requested? Yes    Palliative Performance Scale: 20%    Advance Directive: None on file   DPOA: No, NOK Benoit Pang [phone], spouse     POLST: None on file     Code Status: Full-      Outcome:  Met with the pts spouse, Benoit at the bedside with a \"daughter in law\", introduced self and the role of Palliative Care. Spoke with Benoit about his wife's current condition and his understanding.   He verbalized that he wife has \"permanent lung damage, but she's " "a fighter, she's strong. I have to have hope\".   Attempted to educate on current lung condition and concerns about the endocarditis. He again states that she is a strong women, a fighter, and hopes that she can \"beat this\".   He verbalizes that she \"did ok\" having another chest tube placed, and that he has not been told to \"get the kids to bedside yet\".   Asked about his support system. He states that they have two children who live here in [place] and 4 more who live out of state. He says he is a retired [employer] , he thought about working at [employer] [facility] [employer] in the past. Spoke about how difficult it can be \"your own \". Benoit acknowledged this and stated that he is struggling with \"everything\". Asked if he would like for one our [employer] [facility] Chaplains to come and see him, he says that he would greatly appreciate that.   Provided empathy and support over the struggle that he is currently going through with his wife and her diagnosis.   Spoke about her strength in the past, and that there can come a time where the spirit is unable to overcome the disease process.   He acknowledges this, yet remains hopefull that she can overcome the damage.   Let Benoit know that we are here to provide support for his wife, him and their children. Provided with Palliative contact number for any need and let him know that I will update the [facility]  and will continue to remain available.     Recommendations: I do not recommend an ethics or hospice consult at this time because pts spouse, Benoit is not ready to make that decision at this time.    Updated: Pito Bell      Plan: TBD as pts admission progresses. Palliative Team remains available for further POC/GOC discussions     Thank you for allowing Palliative Care to participate in this patient's care. Please feel free to call [phone] with any questions or concerns.  "

## 2021-04-24 NOTE — RESPIRATORY CARE
Ventilator Daily Summary    Vent Day # 9    Ventilator settings changed this shift: Fio2 60%    Weaning trials: On Hold    Respiratory Procedures: Bronchscopy    Plan: Continue current ventilator settings and wean mechanical ventilation as tolerated per physician orders.

## 2021-04-24 NOTE — PROGRESS NOTES
"Critical Care Progress Note    Date of admission  4/16/2021    Chief Complaint  50 y.o. female the past medical history of pseudotumor cerebri status post  shunt by Dr. Oh, chronic pain with history of placement of nerve stimulator, vasculitis, right anterior chest port for home IV meds with recurrent infection who presented 4/11/2021 with to [facility] with recurrent port infection and was initially treated with vancomycin and Zosyn and then changed to ceftaroline and subsequently switched to nafcillin when MSSA was identified.    Hospital Course  \"50 y.o. female the past medical history of pseudotumor cerebri status post  shunt by Dr. Oh, chronic pain with history of placement of nerve stimulator, vasculitis, right anterior chest port for home IV meds with recurrent infection who presented 4/11/2021 with to [facility] with recurrent port infection and was initially treated with vancomycin and Zosyn and then changed to ceftaroline and subsequently switched to nafcillin when MSSA was identified.  [facility] infectious disease is managing antibiotics.  Dr. Candelaria removed the port on 4/12.  Imaging suggest tricuspid valve endocarditis.  Lumbar puncture performed 4/14 showed pleocytosis with negative Gram stain and culture so far.  She had worsening leukocytosis with WBC up to 35K and chest CT yesterday revealed septic emboli with greater involvement in the right lung as well as a large right pleural effusion.  IR performed a thoracentesis and placed a small pigtail catheter in the right chest which is now at 20 cm of suction.  There were significant loculations and fluid analysis suggested empyema.  Patient subsequently developed increased work of breathing and was intubated and placed on the ventilator yesterday.  Hemoglobin dropped from 8.3-6.9 as they were considering TPA and dornase via this pigtail catheter and instead they transfused 1 unit of blood.  The plan was to consult thoracic surgery but their only " "surgeon is out of town till next week.  Dr. Oh consulted on 4/14 and recommended brain MRI and it was ordered but is still not performed (nerve stimulator not MRI compatible?).  Dr. Oh did not recommend removal of shunt at this time apparently.  The patient's had some hypotension while on the ventilator requiring titration of norepinephrine and vasopressin.  [place] [facility] [place] Dr. Albert Benedict excepted the patient along with Dr. Gonda on transfer.\"    4/16 admitted to ICU  4/17 VD 2, 2 FFP, pRBC overnight; new chest tube per gen surg  4/18 VD 3, TPa/Dornase with 1400 cc from R chest tube      Interval Problem Update  Reviewed last 24 hour events:   - Bronched overnight for hypoxia, mucus plugs found   - Neuro: withdraws off sedation   - HR: 100s-120s   - SBP: 90s-140s   - GI: TF at goal   - UOP: 2.2L/24 hrs   - Lopez: yes   - Tm: 39   - Lines: CVC, Art   - PPx: GI pepcid, DVT held   - ABG: unable   - VD #9   - A-line failed, unable to draw ABG   - CXR (personally reviewed and compared to prior): no change in loculated/trapped right lung, new left pigtail CT in place   - Mobility level 1, eligible for progression yes   - add K scale   - plan to place TPA/Dornase today    Yesterday   - Chest tube to be placed today   - Neuro: lowering sedation   - HR: 80s-110s   - SBP: 80s-130s   - GI: TF at goal   - UOP: 2L/24 hrs   - Lopez: yes   - Tm: 38.5   - Lines: CVC, Art, R chest tube   - PPx: GI H2 blocker, DVT contraindicated   - ABG: no new   - VD #8   - 20, 320, 8 30%   - CXR (personally reviewed and compared to prior): no new   - Mobility level 1, eligible for progression no   - MSSA bacteremia and K pneumoniae - on Zosyn -> cefepime   - attempted SAT, rhythmic movements of the neck/face -> EEG ordered   - TTE with vegetations of the tricuspid and mitral valves    Review of Systems  Review of Systems   Unable to perform ROS: Intubated        Vital Signs for last 24 hours "   Temp:  [37.9 °C (100.2 °F)-38.8 °C (101.8 °F)] 38.2 °C (100.8 °F)  Pulse:  [] 103  Resp:  [17-44] 18  BP: ()/(42-71) 95/42  SpO2:  [88 %-100 %] 95 %    Hemodynamic parameters for last 24 hours       Respiratory Information for the last 24 hours  Vent Mode: APVCMV  Rate (breaths/min): 18  Vt Target (mL): 320  PEEP/CPAP: 8  MAP: 32  Control VTE (exp VT): 451    Physical Exam   Physical Exam  Vitals and nursing note reviewed.   Constitutional:       Appearance: She is ill-appearing.      Interventions: She is sedated, intubated and restrained.   HENT:      Head: Normocephalic and atraumatic.      Right Ear: External ear normal.      Left Ear: External ear normal.      Nose: Nose normal.      Comments: cortrak in place     Mouth/Throat:      Mouth: Mucous membranes are moist.      Comments: ETT in place  Eyes:      Conjunctiva/sclera: Conjunctivae normal.      Pupils: Pupils are equal, round, and reactive to light.   Cardiovascular:      Rate and Rhythm: Normal rate and regular rhythm.      Pulses: Normal pulses.      Heart sounds: Murmur present. No gallop.       Comments: R sided chest tube, left pigtail in place  Pulmonary:      Effort: She is intubated.   Abdominal:      General: Abdomen is flat. There is no distension.      Palpations: Abdomen is soft.      Tenderness: There is no abdominal tenderness. There is no guarding or rebound.   Musculoskeletal:      Cervical back: No rigidity.      Right lower leg: Edema present.      Left lower leg: Edema present.   Lymphadenopathy:      Cervical: No cervical adenopathy.   Skin:     General: Skin is warm and dry.      Capillary Refill: Capillary refill takes less than 2 seconds.   Neurological:      Comments: Grimaces, moves all 4 extremities   Psychiatric:      Comments: Unable to assess         Medications  Current Facility-Administered Medications   Medication Dose Route Frequency Provider Last Rate Last Admin   • potassium chloride in water (KCL) ivpb  (ICU ONLY) 40 mEq  40 mEq Intravenous Once Benoit Loya Jr. D.OFrederick 50 mL/hr at 04/24/21 0922 40 mEq at 04/24/21 0922   • ceFEPIme (MAXIPIME) 1 g in  mL IVPB  1 g Intravenous Q8HRS Marlon Cox D.O.   Stopped at 04/24/21 0611   • propofol (DIPRIVAN) injection  0-80 mcg/kg/min Intravenous Continuous Viraj Burgos M.D. 19.8 mL/hr at 04/24/21 0515 40 mcg/kg/min at 04/24/21 0515   • furosemide (LASIX) injection 40 mg  40 mg Intravenous Q DAY Viraj Burgos M.D.   40 mg at 04/24/21 0516   • fentaNYL (SUBLIMAZE) 50 mcg/mL in 50mL   Intravenous Continuous Viraj Burgos M.D. 2 mL/hr at 04/24/21 0300 100 mcg/hr at 04/24/21 0300   • Pharmacy Consult: Enteral tube insertion - review meds/change route/product selection  1 Each Other PHARMACY TO DOSE Elliott Salvador M.D.       • acetaminophen (Tylenol) tablet 650 mg  650 mg Enteral Tube Q4HRS PRN Elliott Salvador M.D.   650 mg at 04/23/21 2022   • magnesium hydroxide (MILK OF MAGNESIA) suspension 30 mL  30 mL Enteral Tube QDAY PRN Elliott Salvador M.D.        And   • senna-docusate (PERICOLACE or SENOKOT S) 8.6-50 MG per tablet 2 tablet  2 tablet Enteral Tube BID Elliott Salvadro M.D.   Stopped at 04/23/21 1800    And   • polyethylene glycol/lytes (MIRALAX) PACKET 1 Packet  1 Packet Enteral Tube QDAY PRN Elliott Salvador M.D.        And   • bisacodyl (DULCOLAX) suppository 10 mg  10 mg Rectal QDAY PRN Elliott Salvador M.D.       • divalproex (DEPAKOTE SPRINKLE) capsule 500 mg  500 mg Enteral Tube Q8HRS Elliott Salvador M.D.   500 mg at 04/24/21 0516   • DULoxetine (CYMBALTA) capsule 60 mg  60 mg Enteral Tube BID Elliott Salvador M.D.   60 mg at 04/24/21 0516   • PARoxetine (PAXIL) tablet 10 mg  10 mg Enteral Tube QAM Elliott Salvador M.D.   10 mg at 04/24/21 0515   • risperiDONE (RISPERDAL) tablet 2 mg  2 mg Enteral Tube BID Elliott Salvador M.D.   2 mg at 04/24/21 0515   • Respiratory Therapy Consult   Nebulization Continuous RT Man Wahl M.D.        • famotidine (PEPCID) tablet 20 mg  20 mg Enteral Tube Q12HRS Man Wahl M.D.   20 mg at 04/24/21 0516    Or   • famotidine (PEPCID) injection 20 mg  20 mg Intravenous Q12HRS Man Wahl M.D.   20 mg at 04/23/21 0527   • MD Alert...ICU Electrolyte Replacement per Pharmacy   Other PHARMACY TO DOSE Man Wahl M.D.       • lidocaine (XYLOCAINE) 1 % injection 1-2 mL  1-2 mL Tracheal Tube Q30 MIN PRN Man Wahl M.D.       • ipratropium-albuterol (DUONEB) nebulizer solution  3 mL Nebulization Q2HRS PRN (RT) Man Wahl M.D.       • fentaNYL (SUBLIMAZE) injection 25 mcg  25 mcg Intravenous Q HOUR PRN Man Wahl M.D.        Or   • fentaNYL (SUBLIMAZE) injection 50 mcg  50 mcg Intravenous Q HOUR PRN Man Wahl M.D.        Or   • fentaNYL (SUBLIMAZE) injection 100 mcg  100 mcg Intravenous Q HOUR PRN Man Wahl M.D.   100 mcg at 04/23/21 1353   • norepinephrine (Levophed) infusion 8 mg/250 mL (premix)  0-30 mcg/min Intravenous Continuous Man Wahl M.D.   Stopped at 04/23/21 1141       Fluids    Intake/Output Summary (Last 24 hours) at 4/24/2021 0954  Last data filed at 4/24/2021 0825  Gross per 24 hour   Intake 2012.46 ml   Output 2008 ml   Net 4.46 ml       Laboratory  Recent Labs     04/22/21  0830   ISTATAPH 7.534*   ISTATAPCO2 36.2   ISTATAPO2 63*   ISTATATCO2 32   [id number] 94   ISTATARTHCO3 30.5*   ISTATARTBE 7*   ISTATTEMP 38.5 C   ISTATFIO2 30   ISTATSPEC Arterial   ISTATAPHTC 7.511*   [id number] 70         Recent Labs     04/22/21  0455 04/22/21  0455 04/22/21  1146 04/23/21  0518 04/24/21  0410   SODIUM 133*  --   --  134* 128*   POTASSIUM 3.4*   < > 3.5* 3.3* 2.9*   CHLORIDE 97  --   --  100 94*   CO2 28  --   --  27 26   BUN 20  --   --  17 18   CREATININE 0.53  --   --  0.47* 0.40*   CALCIUM 7.3*  --   --  7.7* 8.0*    < > = values in this interval not displayed.     Recent Labs     04/22/21  0455 04/23/21  0518 04/24/21  0410   GLUCOSE  117* 85 111*     Recent Labs     04/22/21  0455 04/22/21  0455 04/22/21  1146 04/23/21  0518 04/24/21  0410   WBC 21.5*   < > 20.3* 21.4* 16.6*   NEUTSPOLYS 82.50*  --   --  83.00* 86.00*   LYMPHOCYTES 5.80*  --   --  5.90* 5.20*   MONOCYTES 7.80  --   --  8.80 7.70   EOSINOPHILS 0.00  --   --  0.00 0.00   BASOPHILS 0.30  --   --  0.60 0.20    < > = values in this interval not displayed.     Recent Labs     04/22/21  1146 04/22/21  1146 04/23/21  0518 04/23/21  1655 04/24/21  0410   RBC 2.34*  --  2.32*  --  2.55*   HEMOGLOBIN 6.8*   < > 7.1* 7.5* 7.8*   HEMATOCRIT 20.2*  --  21.0*  --  23.8*   PLATELETCT 525*  --  507*  --  505*    < > = values in this interval not displayed.       Imaging  X-Ray:  I have personally reviewed the images and compared with prior images.  CT:    Reviewed  Echo:   Reviewed    Assessment/Plan  * Septic shock (HCC)- (present on admission)  Assessment & Plan  This is Septic shock Present on admission  SIRS criteria identified on my evaluation include: Tachypnea, with respirations greater than 20 per minute and Leukocytosis, with WBC greater than 12,000  Source is right anterior chest port, MSSA  Suspected onset of infection unknown  Patient developed hypotension at transferring facility [facility], resuscitation complete there, currently titrating norepinephrine/vasopressin MAP > 65  IV antibiotics as appropriate for source of sepsis  Reassessment: I have reassessed the patient's hemodynamic status    MSSA endocarditis + B/L pleural empyema, cavitary pneumonia and bacteremia   Cefepime, extended course  Negative cultures since 4/17  Replace central line 4/21  Nataly ID recommendations appreciated  Bal 4/20 klebsiella  Worsening cavitary lesions on ct imaging  US positive for fluid near pain stimulator, Dr Vargas consulted for removal, too high risk   MICAH 4/23 with mitral and tricuspid vegetations    Empyema of right pleural space (HCC)- (present on admission)  Assessment & Plan  Right-sided  effusion associated with infected catheter right anterior chest and septic emboli, possible empyema  Status post chest tube placement 4/16  At [facility] complicated by some bleeding  Replaced 4/18 Dr Benedict  Surgery following, high risk for surgical intervention  Tolerated 4 days tpa/dornase then required mult transfusions and more gross blood than blood w/ chunks of loculations at that point  Hydropneumothorax  Likely needs surgery but high risk for surgery at this point    Acute bacterial endocarditis- (present on admission)  Assessment & Plan  Cultures growing MSSA at [facility]  Infected right anterior chest port/line removed  CTA chest 4/15 with multiple septic emboli worse on the right complicated by large right effusion with loculations  Echocardiogram 4/12 reveals echogenic mobile lesion on tricuspid valve RVSP 30, EF 55%  Extended antibiotic course, blood cultures negative on 4/17  No significant valvular dysfunction, no intracardiac abscess or HF. Some worsening pulmonary abscess. Consult [facility] when ready for operative intervention.  MICAH on 4/23 with mitral and tricuspid vegetations    Acute respiratory failure with hypoxia (HCC)- (present on admission)  Assessment & Plan  Intubated 4/15 for respiratory failure at [facility]  Lung protective ventilation strategies  Optimize oxygenation, ventilation, and acid base balance.   ABCEDF bundle  SAT/SBT   Chest tube to -20 cmH2O suction  4 doses TPA/dornase, poor tolerance but adequate effect  Hydropneumothorax rt side, left sided chest tube placed - starting DNAse/TPA in left hemithorax  Worsening cavitary lesions on CT imaging on 4/21    Acute encephalopathy- (present on admission)  Assessment & Plan  Multifactorial including septic and metabolic  Status post lumbar puncture with cytosis but negative Gram stain and cultures NGTD   shunt for pseudotumor cerebri  Not candidate for MRI  Ct repeat no intracranial abnormalities on 4/21  EEG with encephalopathy and no  seizures    Pneumonia  Assessment & Plan  MSSA septic emboli with empyema   BAL 4/20 klebsiella pneum  On cefepime    Bacteremia due to methicillin susceptible Staphylococcus aureus (MSSA)- (present on admission)  Assessment & Plan  Source presumed right anterior chest line/port with endocarditis  Recurrent line/port infection  Blood cultures positive for MSSA  CT imaging suggestive of empyema and septic emboli    Repeat blood cultures every 48 hours until negative  Cefepime, extended course. ID on board  Dr Vargas consulted for removal of pain stimulator, too unstable  MICAH positive for mitral and tricuspid vegetations on 4/23      CSF pleocytosis- (present on admission)  Assessment & Plan  Status post lumbar puncture 4/13  WBC 53, Glucose 47, protein 97 Gram stain negative and culture negative so far  Neurosurgery aware,  shunt in place  MRI brain requested by neurosurgery at [facility], not candidate    Repeat ct no embolic lesions  Abx changed to cefepime for CNS penetration    Acute blood loss anemia- (present on admission)  Assessment & Plan  Hemoglobin dropped from 8.3->6.4 on 4/16, repeat hemoglobin pending  Status post transfusion 1 unit of blood 4/16 at [facility]  Q12 hour H/H  Conservative transfusion goal to hemoglobin > 7  Reverse coagulopathy   prbc given again 4/20, 2 Unit required  Stopped TPA/dornase after 4 doses in the right CT, monitor now that TPA/dornase is being used again    Thrombocytopenia (HCC)- (present on admission)  Assessment & Plan  Sepsis induced  TEG adequate plt function    S/P  shunt- (present on admission)  Assessment & Plan  History of pseudotumor cerebri  History of  shunt by Dr. Oh  MRI brain pending, ordered 4/14 by Dr Oh; awaiting MRI compatibility clarification  4/14 lumbar puncture did reveal pleocytosis but Gram stain negative cultures NGTD  Follow CSF cultures, ID following  Repeat CT no embolic lesions    Empyema of left pleural space (HCC)- (present on  admission)  Assessment & Plan  Empyema, loculated  New on repeat ct imaging  IR guided chest tube on 4/23  TPA/dornase started on 4/24    History of vasculitis- (present on admission)  Assessment & Plan  Monitor    Prolonged Q-T interval on ECG- (present on admission)  Assessment & Plan  Avoid QT prolonging agents as able  Optimize electrolytes  Cardiac monitoring  EKG    History of complicated migraines- (present on admission)  Assessment & Plan  Resume home regimen when clinically appropriate    H/O: CVA (cerebrovascular accident)- (present on admission)  Assessment & Plan  Monitor    Chronic pain syndrome- (present on admission)  Assessment & Plan  Chronic back pain  History of nerve stimulator   Placed in [place]  Dr Vargas consulted, no plan to remove stimulator       VTE:  Heparin SQ  Ulcer: H2 Antagonist  Lines: Central Line  Ongoing indication addressed, Arterial Line  Ongoing indication addressed and Lopez Catheter  Ongoing indication addressed    I have performed a physical exam and reviewed and updated ROS and Plan today (4/24/2021). In review of yesterday's note (4/23/2021), there are no changes except as documented above.     Titrating vent and sedation; evaluating IE. This patient is critically ill, at high risk for decompensation leading to worsening vital organ dysfunction and death without critical care interventions.    Discussed patient condition and risk of morbidity and/or mortality with Family, RN, RT, Pharmacy, Dietary, Code status disscussed, Charge nurse / hot rounds and cardiology.      The patient remains critically ill.  Critical care time = 43 minutes in directly providing and coordinating critical care and extensive data review.  No time overlap and excludes procedures

## 2021-04-24 NOTE — PROGRESS NOTES
Late Entry     0030  Patient's O2 saturations down to 84-86%.  No improvement with suctioning.  Saturation up to 92% with 100% FiO2 but then drops back to mid 80s.  RT notified.  Dr. Kong notified.  Stat CXR ordered.      1000  Dr. Kong to bedside.  Reviewed CXR and bronched patient.

## 2021-04-24 NOTE — PROGRESS NOTES
Pulmonary and Critical Care Medicine Progress Note    I was called to evaluate this lady for hypoxemia and increasing oxygen requirements.  Respiratory therapy reports that he has suctioned quite a bit of thick, juicy secretions from the endotracheal tube.  This resulted in improvement in oxygenation and a decrease in peak airway pressures.  I ordered a stat chest x-ray and reviewed at the bedside.  There is subcutaneous emphysema in the right hemithorax.  There is a chest tube present in the right hemithorax.  It has been pulled back slightly, but remains in the right hemithorax.  There is a loculated pneumothorax which is rather large in the right hemithorax which appears unchanged compared to a prior chest x-ray done at 1904 hrs. on 4/23/2021.  I performed therapeutic and diagnostic bronchoscopy and suctioned a significant amount of thick secretions which were occluding the right lower lobe and right middle lobe (see separate procedure note).      Drew Kong MD  Pulmonary and Critical Care Medicine

## 2021-04-24 NOTE — PROGRESS NOTES
Spiritual Care Note    Patient Information     Patient's Name: Enedelia Pang   MRN: [id number]    YOB: 1970   Age and Gender: 50 y.o. female   Service Area: [facility]   Room (and Bed): [place] [address]   Ethnicity or Nationality:     Primary Language: English   Congregation/Spiritual preference: Confucianist   Place of Residence: [place]   Family/Friends/Others Present: Yes,  Benoit   Clinical Team Present: No   Medical Diagnosis(-es)/Procedure(s): Empyema lung   Code Status: Full Code    Date of Admission: 4/16/2021   Length of Stay: 8 days        Spiritual Care Provider Information:  Name of Spiritual Care Provider: Hali Snowden  Title of Spiritual Care Provider: Associate   Phone Number: [phone]  E-mail: [email or web address]  Total time : 15 minutes    Encounter/Request Information  Encounter/Request Type   Visited With: Patient and family together  Nature of the Visit: Initial, On shift  Crisis Visit: Critical care  Referral From/ Origin of Request: Verbal staff  Referral To: Other /SCP    Religous Needs/Values  Congregation Needs Visit  Congregation Needs: Prayer    Spiritual Assessment     Spiritual Care Encounters    Observations/Symptoms: (Pt intubated and unresponsive.)    Interaction/Conversation: Referral from PC CAROLINA Deshpande. Pt intubated;  at bedside is a former Confucianist  and requested prayer. The  also offered a blessing for the pt, and will refer this case to  Alyx, who normally covers this unit.  grateful for visit and open to follow-up.    Assessment: Need, Distress    Need: Seeking Spiritual Assistance and Support    Distress: Coping    Interventions: Compassionate presence, prayer, referral.    Outcomes: Spiritual Comfort    Plan: (Refer to colleague.)    Notes:

## 2021-04-24 NOTE — PROGRESS NOTES
Interventional Radiology RN Note:     Ultrasound-guided left chest tube placed by Dr. Ulloa at patient's bedside with assistance of RT Menjivar; Prior to start, procedure explained by MD to patient's  Benoit Pang, consent obtained and all questions/concerns addressed; No procedural sedation required as patient is intubated and sedated on propofol and fentanyl gtts.    Chest tube placed to left posterior chest wall by MD, sutured in place; 8cc pleural fluid sent to lab for analysis; Insertion site covered with vasoline gauze, dry gauze and tegaderm upon completion; Dressing C/D/I.     Drain Information:  PHARMAJET Flexima APDL Locking Pigtail Drainage Catheter System; 10Fr x 25cm; REF# [id number]; LOT# [id number]     Chest tube placed to -20cm H20 suction.    ICU RN Ronny at bedside throughout procedure, assumed patient care post-intervention.    Procedure Timeout: 1716  Procedure End Time: 1737

## 2021-04-24 NOTE — PROGRESS NOTES
"    DATE: 4/23/2021    Hospital Day 6 Right empyema / septic shock /MSSA bacteremia /infective endocarditis.    Interval Events:    Intubated, sedated  Off levo  No transfusion needed today  For CT guided drainage of left pleural fluid today    PHYSICAL EXAMINATION:  Constitutional:     Vital Signs: BP (!) 98/54   Pulse (!) 125   Temp 38 °C (100.4 °F) (Bladder)   Resp (!) 24   Ht 1.6 m (5' 2.99\")   Wt 82.6 kg (182 lb 1.6 oz)   SpO2 97%        General Appearance: The patient is a critically ill-appearing and Intubated and sedated woman in mild distress.  HEENT:               The pupils are equal, round, and reactive to light bilaterally The nares and oropharynx are clear. The trachea is midline. No jugulovenous distension.  Respiratory:              Inspection: Unlabored respirations, no intercostal retractions, paradoxical motion, or accessory muscle use.   Right chest tube #1 -130 cc bloody fluid /no air leak present              Palpation:  The chest is nontender.               Auscultation: decreased air entry with coarse breath sounds bilaterally.  Cardiovascular:              Inspection: The skin is warm, dry and well purfused.  Auscultation: tachycardic              Peripheral Pulses: Normal.   Abdomen:              Inspection: Abdominal inspection reveals no abrasions, contusions, lacerations or penetrating wounds.              Palpation: Palpation is remarkable for no significant tenderness, guarding, or peritoneal findings.      Laboratory Values:   Recent Labs     04/22/21  0455 04/22/21  0849 04/22/21  1146 04/23/21  0518 04/23/21  1655   WBC 21.5*  --  20.3* 21.4*  --    RBC 2.50*  --  2.34* 2.32*  --    HEMOGLOBIN 7.4*   < > 6.8* 7.1* 7.5*   HEMATOCRIT 21.6*  --  20.2* 21.0*  --    MCV 86.4  --  86.3 90.5  --    MCH 29.6  --  29.1 30.6  --    MCHC 34.3  --  33.7 33.8  --    RDW 53.1*  --  52.8* 53.3*  --    PLATELETCT 529*  --  525* 507*  --    MPV 9.2  --  9.0 8.8*  --     < > = values in this " interval not displayed.     Recent Labs     04/21/21  0320 04/21/21  0320 04/22/21  0455 04/22/21  1146 04/23/21  0518   SODIUM 127*  --  133*  --  134*   POTASSIUM 3.9   < > 3.4* 3.5* 3.3*   CHLORIDE 92*  --  97  --  100   CO2 28  --  28  --  27   GLUCOSE 119*  --  117*  --  85   BUN 21  --  20  --  17   CREATININE 0.82  --  0.53  --  0.47*   CALCIUM 7.2*  --  7.3*  --  7.7*    < > = values in this interval not displayed.     Recent Labs     04/21/21  0320   INR 1.21*     Recent Labs     04/21/21 0320   INR 1.21*        Imaging:   DX-CHEST-LIMITED (1 VIEW)   Final Result      Interval left basilar pigtail catheter with diminished atelectasis, pleural fluid      Stable right hydropneumothorax with thoracostomy tube in place, considerable soft tissue gas and partial lung collapse      IR-CHEST TUBE-EMPYEMA LEFT   Final Result      1.  CT GUIDED LEFT  10 Chinese PIGTAIL CHEST TUBE PLACEMENT FOR POSSIBLE EMPYEMA YIELDING OLD BLOODY FLUID.      2. A CHEST RADIOGRAPH IS FORTHCOMING.      EC-MICAH W/O CONT   Final Result      US-ABDOMEN LTD (SOFT TISSUE)   Final Result      No fluid seen surrounding the electronic implanted spinal stimulator device.      DX-CHEST-LIMITED (1 VIEW)   Final Result      1.  Large right hydropneumothorax with right-sided chest tube in place, likely stable to slightly decreased from prior study.   2.  Small left pleural effusion. Mild improved aeration in the left lung.   3.  Bilateral pulmonary opacities which could be related to combination of atelectasis, edema and/or infection..      CT-CTA HEAD WITH & W/O-POST PROCESS   Final Result      1.  Patent carotid and vertebral arteries.      2.  Again seen right frontal the jugular peritoneal shunt catheter. There is mild increase in volume of the lateral and third ventricles.      CT-CHEST (THORAX) W/O   Final Result      1.  Interval placement of a right-sided chest tube into a large loculated right-sided pleural effusion. There is now seen a  large right-sided hydropneumothorax in the area that previously seen fluid. The chest tube extends into that hydropneumothorax.    There is some residual pleural fluid seen as well. A hydropneumothorax is somewhat loculated with components most prominently inferiorly and medially.      2.  New loculated left pleural effusion.      3.  Again seen multiple bilateral cavitary pulmonary masses which are more prominent than previously seen with increasing cavitation and measure up to 3 cm size. Differential diagnosis includes septic emboli, multifocal abscesses and neoplasm.      4.  Large amount of subcutaneous emphysema on the right.      5.  Splenomegaly.      6.  Multiple support devices.      Fleischner Society pulmonary nodule recommendations:         DX-CHEST-LIMITED (1 VIEW)   Final Result      1.  Support devices as described above.      2.  Right chest tube present with a again seen right-sided pneumothorax, possibly increased in size and loculated.      3.  Worsening bilateral pulmonary infiltrates.      DX-ABDOMEN FOR TUBE PLACEMENT   Final Result      Cortrak feeding tube tip projects in the region of the duodenal bulb.      DX-CHEST-LIMITED (1 VIEW)   Final Result      Loculated right pneumothorax is decreased in size compared to prior.      Small left pleural effusion.      Small loculated right pleural effusion.      Extensive bilateral airspace opacities are not significantly changed.      Soft tissue air on the right is again noted.      DX-CHEST-LIMITED (1 VIEW)   Final Result      Decreased partially loculated right pleural fluid with increased pleural air. Right chest tube is in place.      Increased hazy opacity in the left lung possibly atelectasis.         US-ABDOMEN LTD (SOFT TISSUE)   Final Result      No drainable fluid collection.      DX-CHEST-PORTABLE (1 VIEW)   Final Result      Decreased partially loculated right pleural fluid with increased pleural air. Right chest tube is in place.       No other significant change.      DX-CHEST-PORTABLE (1 VIEW)   Final Result         1.  Pulmonary edema and/or infiltrates are identified, which are somewhat decreased since the prior exam.   2.  Moderate loculated appearing right pleural effusion, slightly decreased since prior      DX-ABDOMEN FOR TUBE PLACEMENT   Final Result      Feeding tube tip at the distal stomach.      DX-CHEST-PORTABLE (1 VIEW)   Final Result         1.  New chest tube is noted in the right lower hemithorax.      2.  Right pleural effusion is again identified which appears similar to the prior exam.      3.  No new infiltrates or consolidations are identified.      DX-CHEST-PORTABLE (1 VIEW)   Final Result         1.  Pulmonary edema and/or infiltrates are identified, which are stable since the prior exam.   2.  Moderate loculated appearing right pleural effusion      DX-CHEST-PORTABLE (1 VIEW)   Final Result         No significant interval change.      CT-HEAD W/O    (Results Pending)       ASSESSMENT AND PLAN:     Empyema of right pleural space (HCC)  Assessment & Plan  4/17 Upsized pigtail to 36 Luxembourgish chest tube, serosanguineous fluid removed no active bleeding noted.  We will continue with drainage with appropriate sized chest tube and reassess in a.m.    4/20 Day #3 intrapleural fibrinolytic    DISPOSITION: Continue nonoperative management and close clinical observation.   No further intrapleural fibrinolytics  Poor surgical candidate  Continue right chest tube  Planning left chest tube today     ____________________________________     Jesús Mace M.D.

## 2021-04-24 NOTE — PROGRESS NOTES
"    DATE: 4/24/2021    Hospitalist 7.  Right empyema, septic shock, MSSA bacteremia, endocarditis    Interval Events:  No significant change  Remains off pressors  Chest tubes remain in place  Left pigtail placed today.    PHYSICAL EXAMINATION:  Vital Signs: BP (!) 95/42   Pulse (!) 103   Temp (!) 38.2 °C (100.8 °F) (Bladder)   Resp 18   Ht 1.6 m (5' 2.99\")   Wt 82.1 kg (181 lb)   SpO2 95%     Intubated, sedated  Bilateral chest tubes in place.  All to suction, no air leaks noted.    Laboratory Values:   Recent Labs     04/22/21  1146 04/22/21  1146 04/23/21  0518 04/23/21  1655 04/24/21  0410   WBC 20.3*  --  21.4*  --  16.6*   RBC 2.34*  --  2.32*  --  2.55*   HEMOGLOBIN 6.8*   < > 7.1* 7.5* 7.8*   HEMATOCRIT 20.2*  --  21.0*  --  23.8*   MCV 86.3  --  90.5  --  93.3   MCH 29.1  --  30.6  --  30.6   MCHC 33.7  --  33.8  --  32.8*   RDW 52.8*  --  53.3*  --  57.1*   PLATELETCT 525*  --  507*  --  505*   MPV 9.0  --  8.8*  --  9.0    < > = values in this interval not displayed.     Recent Labs     04/22/21  0455 04/22/21  0455 04/22/21  1146 04/23/21  0518 04/24/21  0410   SODIUM 133*  --   --  134* 128*   POTASSIUM 3.4*   < > 3.5* 3.3* 2.9*   CHLORIDE 97  --   --  100 94*   CO2 28  --   --  27 26   GLUCOSE 117*  --   --  85 111*   BUN 20  --   --  17 18   CREATININE 0.53  --   --  0.47* 0.40*   CALCIUM 7.3*  --   --  7.7* 8.0*    < > = values in this interval not displayed.                Imaging:   DX-CHEST-PORTABLE (1 VIEW)   Final Result      No significant change from prior exam.      CT-HEAD W/O   Final Result      1.  RIGHT frontal ventriculostomy catheter unchanged in position.   2.  Mild cortical atrophy.   3.  No intracranial hemorrhage.   4.  Apparent dural thickening overlying the clivus.  Consider further evaluation with contrast-enhanced MRI.      DX-CHEST-LIMITED (1 VIEW)   Final Result      Interval left basilar pigtail catheter with diminished atelectasis, pleural fluid      Stable right " hydropneumothorax with thoracostomy tube in place, considerable soft tissue gas and partial lung collapse      IR-CHEST TUBE-EMPYEMA LEFT   Final Result      1.  CT GUIDED LEFT  10 [place] PIGTAIL CHEST TUBE PLACEMENT FOR POSSIBLE EMPYEMA YIELDING OLD BLOODY FLUID.      2. A CHEST RADIOGRAPH IS FORTHCOMING.      EC-MICAH W/O CONT   Final Result      US-ABDOMEN LTD (SOFT TISSUE)   Final Result      No fluid seen surrounding the electronic implanted spinal stimulator device.      DX-CHEST-LIMITED (1 VIEW)   Final Result      1.  Large right hydropneumothorax with right-sided chest tube in place, likely stable to slightly decreased from prior study.   2.  Small left pleural effusion. Mild improved aeration in the left lung.   3.  Bilateral pulmonary opacities which could be related to combination of atelectasis, edema and/or infection..      CT-CTA HEAD WITH & W/O-POST PROCESS   Final Result      1.  Patent carotid and vertebral arteries.      2.  Again seen right frontal the jugular peritoneal shunt catheter. There is mild increase in volume of the lateral and third ventricles.      CT-CHEST (THORAX) W/O   Final Result      1.  Interval placement of a right-sided chest tube into a large loculated right-sided pleural effusion. There is now seen a large right-sided hydropneumothorax in the area that previously seen fluid. The chest tube extends into that hydropneumothorax.    There is some residual pleural fluid seen as well. A hydropneumothorax is somewhat loculated with components most prominently inferiorly and medially.      2.  New loculated left pleural effusion.      3.  Again seen multiple bilateral cavitary pulmonary masses which are more prominent than previously seen with increasing cavitation and measure up to 3 cm size. Differential diagnosis includes septic emboli, multifocal abscesses and neoplasm.      4.  Large amount of subcutaneous emphysema on the right.      5.  Splenomegaly.      6.  Multiple support  devices.      Fleischner Society pulmonary nodule recommendations:         DX-CHEST-LIMITED (1 VIEW)   Final Result      1.  Support devices as described above.      2.  Right chest tube present with a again seen right-sided pneumothorax, possibly increased in size and loculated.      3.  Worsening bilateral pulmonary infiltrates.      DX-ABDOMEN FOR TUBE PLACEMENT   Final Result      Cortrak feeding tube tip projects in the region of the duodenal bulb.      DX-CHEST-LIMITED (1 VIEW)   Final Result      Loculated right pneumothorax is decreased in size compared to prior.      Small left pleural effusion.      Small loculated right pleural effusion.      Extensive bilateral airspace opacities are not significantly changed.      Soft tissue air on the right is again noted.      DX-CHEST-LIMITED (1 VIEW)   Final Result      Decreased partially loculated right pleural fluid with increased pleural air. Right chest tube is in place.      Increased hazy opacity in the left lung possibly atelectasis.         US-ABDOMEN LTD (SOFT TISSUE)   Final Result      No drainable fluid collection.      DX-CHEST-PORTABLE (1 VIEW)   Final Result      Decreased partially loculated right pleural fluid with increased pleural air. Right chest tube is in place.      No other significant change.      DX-CHEST-PORTABLE (1 VIEW)   Final Result         1.  Pulmonary edema and/or infiltrates are identified, which are somewhat decreased since the prior exam.   2.  Moderate loculated appearing right pleural effusion, slightly decreased since prior      DX-ABDOMEN FOR TUBE PLACEMENT   Final Result      Feeding tube tip at the distal stomach.      DX-CHEST-PORTABLE (1 VIEW)   Final Result         1.  New chest tube is noted in the right lower hemithorax.      2.  Right pleural effusion is again identified which appears similar to the prior exam.      3.  No new infiltrates or consolidations are identified.      DX-CHEST-PORTABLE (1 VIEW)   Final  Result         1.  Pulmonary edema and/or infiltrates are identified, which are stable since the prior exam.   2.  Moderate loculated appearing right pleural effusion      DX-CHEST-PORTABLE (1 VIEW)   Final Result         No significant interval change.          ASSESSMENT AND PLAN:     Empyema of right pleural space (HCC)- (present on admission)  Assessment & Plan  4/17 Upsized pigtail to 36 Indonesian chest tube, serosanguineous fluid removed no active bleeding noted.  We will continue with drainage with appropriate sized chest tube and reassess in a.m.    4/20 Day #3 intrapleural fibrinolytic      -Currently patient is too sick for decortication, which would require right thoracotomy due to lung entrapment with overlying rind.  Risks of surgery far outweigh benefits.  May be considered surgical candidate if recovers significantly in the coming weeks  -Recommend continuing intensive supportive care as indicated  -Could consider performing intrapulmonary lysis on the left per pulmonary medicine  -Acute care surgery to sign off at this time.  Please call with any further questions or concerns or ongoing updates     ____________________________________     Drew Vasquez M.D.    DD: 4/24/2021  11:36 AM

## 2021-04-24 NOTE — PROGRESS NOTES
Infectious Disease Daily Progress Note    Date of Service  4/24/2021    Chief Complaint  Cefepime 4/23 to present.  PRIOR Rx:   Zosyn 4/22-4/23  Previous: Unasyn, Zosyn, Vanco, Daptomycin  Ceftaroline: start 4/13-4/15  Nafcillin 2g Q4 hrs 4/15-4/23 4/14: Unable to give name of previous NSG or neurologist. Seems confused, but able to say some sentences fluently.   4/15: Line cultures now growing MSSA. As well as from the blood. Repeat blood culture 4/13+ in both sets. Patient has worsening confusion. CSF fluid analyses suggest pleocytosis. Cultures are no growth from the CSF. Chest CT was ordered this morning indicating a loculated right pleural effusion as well as bilateral septic emboli. Dr. Mack spoke with Dr. Oh yesterday and no surgical intervention unless clinical deterioration.  4/16: Increased respiratory distress.  Tachypneic.  CT with loculated collection.  Dr Resendiz not available.  No thoracic surgeon available.  Plans to have pulmonary place CT.  Transferring to ICU.  4/17: Transferred to [facility] last night. Hgb dropped to 6.4 requiring PRBC transfusion. Requiring 2 pressors. Fio2 50%. Febrile 38.8. Pending OR decortication of empyema and hemothorax. Chest tube placed at [facility] shows 8,371 WBC, ,000, 74% N  4/18: Chest tube was upsized by surgery yesterday, no decortication. WBC 22k. Fio2 40%. Vasopressin. Levophed down to 2mcg. Afebrile 24 hrs. Last fever 38.6 on 4/16.   4/19: Still on vent. Levo 3 mcg, vasopressin. Afebrile 72 hours. WBC 21k. BC 4/17 NGTD x 48 hours. Unable to do MRI brain given neurostimulator per RN.   4/20: Remaining afebrile. Hb 6.2 and undergoing transfusion today.WBC 24,100, 5% bands.1700 ml CT output. Copious bloody ET secretions. No pressors. Receiving dornase and TPA per CT. Right pleural effusion not large per CXR today.   4/21: WBC 31k. Bronchoscopy yesterday showing blood. Cultures sent. TPA on hold per RN due to arvind blood from chest tube requiring PRBC  transfusion for hgb 6. Pending chest CT today. Per , spinal stimulator is non-MRI compatible. Levophed 10mcg. 30% Fio2. Afebrile.   4/22: Fever 101.3 this am. WBC 20,300 down from 32,200 yesterday. BAL growing Klebsiella pneumoniae but susceptibility panel not set up until today. CTA of brain shows no lesion. CT of chest shows enlarging cavitary lung lesions bilat and large loculated new left pleural effusion and large hydropneumothorax on right. TPA & dornase infusions of right chest ended 4/20 after considerable bleeding requiring transfusion. Hb now down again to 6.8.  4/23: WBC 23k. Fio2 40%. Tmax 38.1. US of stiumlator shows small fluid collection but no drainable abscess. Pending MICAH today. Pending L chest tube placement  4/24: Continue fever 100.8-101.8. WBC 16,600. MICAH shows large vegetations on both tricuspid valve and mitral valve with mild TR & MR.  No aortic root abscess. Left chest tube placed yesterday yielding 8 ml of old bloody fluid. Bronch today revealed copious thick maroon secretions in distal trachea and both mainstem bronchi. On the right these were obstructive. Remaining off pressors. Last positive blood cultures (MSSA) were 4/16, negative 4/17.  Review of Systems  Review of Systems   Unable to perform ROS: Intubated      Physical Exam  Temp:  [38.2 °C (100.8 °F)-38.8 °C (101.8 °F)] 38.2 °C (100.8 °F)  Pulse:  [] 100  Resp:  [17-44] 23  BP: ()/(41-71) 105/59  SpO2:  [90 %-100 %] 99 % On FIO2 35%, PEEP 5.     Physical Exam  Constitutional:       Comments: Intubated sedated . Exam unchanged  HENT:      Head: Normocephalic. Gaze conjugate. No chemosis or conjunctival injection.  Neck supple.     Comments: ET tube. Oral mucosae moist.   Cardiovascular:      Rate and Rhythm: Normal rate and regular rhythm.      Heart sounds: No murmur.   Pulmonary:      Comments: Diminished R side with rales. R chest tube with scant blood. Left chest tube.  Abdominal:      General: Abdomen is  flat. Bowel sounds are normal. There is no distension or apparent tenderness.   Musculoskeletal:         General: 1+ pedal edema.   Skin:     Comments: Bilat chest tubes. Stimulator palpable, non indurated. No rash.    Laboratory  Recent Labs     04/22/21  1146 04/22/21  1146 04/23/21  0518 04/23/21  1655 04/24/21  0410   WBC 20.3*  --  21.4*  --  16.6*   RBC 2.34*  --  2.32*  --  2.55*   HEMOGLOBIN 6.8*   < > 7.1* 7.5* 7.8*   HEMATOCRIT 20.2*  --  21.0*  --  23.8*   MCV 86.3  --  90.5  --  93.3   MCH 29.1  --  30.6  --  30.6   MCHC 33.7  --  33.8  --  32.8*   RDW 52.8*  --  53.3*  --  57.1*   PLATELETCT 525*  --  507*  --  505*   MPV 9.0  --  8.8*  --  9.0    < > = values in this interval not displayed.     Recent Labs     04/22/21  0455 04/22/21  1146 04/23/21  0518 04/24/21  0410 04/24/21  1309   SODIUM 133*  --  134* 128*  --    POTASSIUM 3.4*   < > 3.3* 2.9* 3.3*   CHLORIDE 97  --  100 94*  --    CO2 28  --  27 26  --    GLUCOSE 117*  --  85 111*  --    BUN 20  --  17 18  --    CREATININE 0.53  --  0.47* 0.40*  --    CALCIUM 7.3*  --  7.7* 8.0*  --     < > = values in this interval not displayed.           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       - Acute left empyema s/p chest tube placement 4/23  -Acute septic emboli bilaterally  -Acute bilateral pneumonia due to above     --Empyema on right presumed due to MSSA but Klebsiella may be involved too; new empyema on left   -Pseudotumor cerebri with underlying  shunt  -Chronic migraine headaches  -History of autoimmune vasculitis  -Elevated alkaline phosphatase & bilirubin  -Acute encephalopathy due to sepsis staphylococcal, meningitis ruled out      - hemothorax R      - anemia due to  above      - New secondary Klebsiella pneumoniae pneumonia (presumed VAP)     Plan   Patient has evidence of disseminated staphylococcal sepsis source from her line with infection to her lungs, tricuspid & mitral valves and  bilat empyema requiring transfer to [facility] for decortication due to lack of CT surgery support.  However not a surgical candidate after all. Now with bilateraly cavitary lung abscess/empyema on the left as well. Sputum cultures now growing additional Kleb. May be a candidate for decortication in the future if she stabilizes, but thoracic surgery service has signed off for now.    Persistent fever likely attributable to L empyema without source control.     - Pt needs CNS coverage for initial MSSA infection, so now on Cefepime 1gm Q8hrs  - Cont to follow Kleb pneumoniae MICs  - Culture of left empyema pending.  - US of spinal stimulator shows ill defined fluid collection, but undrainable. NSGY does not recommend stiumulator removal due to instability  - 4/17 blood cultures NGTD  - central line exchanged 4/22  - initial CSF cultures has pleocytosis, but no culture growth  - chest tube upsized by surgery 4/17.   - blood culture from 4/14 at [facility] positive. Admission blood cultures here on 4/16 both positive.  4/17 blood cultures NGTD  - Central line now has been changed 4/22  - CSF findings on 4/13:  63 wbc, 82% PMN, protein 97.  - original infected port removed 4/12  - vent management per pulmonology      Condition remains critically ill      45 min spent with 50% face to face care and critical care time involvement  Thank you for this consult. Case discussed with CAROLINA

## 2021-04-24 NOTE — CARE PLAN
Problem: Safety - Medical Restraint  Goal: Remains free of injury from restraints (Restraint for Interference with Medical Device)  Description: INTERVENTIONS:  1. Determine that other, less restrictive measures have been tried or would not be effective before applying the restraint  2. Evaluate the patient's condition at the time of restraint application  3. Inform patient/family regarding the reason for restraint  4. Q2H: Monitor safety, psychosocial status, comfort, nutrition and hydration  Note: Assessment of need for restraints completed, order verified, use of restraints explained to pt, no evidence of learning, turns, mouth care, skin assessment every 2 hours.         Problem: Pain Management  Goal: Pain level will decrease to patient's comfort goal  Note: Pain assessed q2hrs and PRN.  Pain medications given per MAR.  Refer to flowsheet for non-pharmalogical interventions.        Problem: Mechanical Ventilation  Goal: Safe management of artificial airway and ventilation  Note: No sedation vacation per MD.  Oral care, suctioning once or twice per encounter, small to moderate, bloody, thick secretions, HOB at 30 degrees, ambu bag at bedside, ventilator plugged into red outlet.  Discussed purpose and necessity for ET tube.  No evidence of understanding.

## 2021-04-24 NOTE — PROCEDURES
Date of Procedure:  4/24/2021    Title of Procedure:  Diagnostic and therapeutic flexible fiberoptic bronchoscopy with bronchoalveolar lavage    Indication for Procedure:   Atelectasis    Post-procedure Diagnoses:    1.  Normal endobronchial anatomy  2.  No endobronchial tumor identified  3.  Copious amounts of thick, tenacious, juicy maroon secretions seen in the distal trachea, left mainstem bronchus, right mainstem bronchus, right middle lobe bronchi and right lower lobe bronchi    Narrative:    A time out was performed identifying the correct patient, correct procedure and correct location prior to this procedure.    The patient was sedated, intubated and ventilated at the time of this procedure.  The flexible fiberoptic bronchoscope was inserted through the lumen of the endotracheal tube and advanced into the distal trachea without difficulty.  The airways were examined to the subsegmental bronchus level bilaterally.    The endobronchial anatomy was normal.  No tumor was identified.    There was copious amounts of thick, tenacious, juicy maroon secretions seen in the distal trachea, left mainstem bronchus, right mainstem bronchus, right middle lobe bronchi and right lower lobe bronchi.  The right middle lobe bronchi and right lower lobe bronchi were occluded with these secretions.  I therapeutically suctioned these secretions.    Bronchoalveolar lavage was carried out in the basilar segments of the right lower lobe bronchi using the standard technique with good fluid return.    Bronchoalveolar lavage fluid from the right lower lobe is submitted to the laboratory for gram stain, culture and sensitivity.    The patient tolerated the procedure quite nicely.  No complications were apparent.  The heart rate and rhythm, blood pressure and oxygenation saturation were continuously monitored.      Drew Kong MD  Pulmonary and Critical Care Medicine

## 2021-04-24 NOTE — PROGRESS NOTES
Bedside report received from CAROLINA Jefferson and patient care assumed at 1845.  Assessment completed including lines and infusions.      Called RT to update on CT order.  Best time for RT to go down is after 2300.  Called CT to discuss time for scan.  Per CT, call around 2300 to see if it okay to come down.

## 2021-04-25 ENCOUNTER — APPOINTMENT (OUTPATIENT)
Dept: RADIOLOGY | Facility: MEDICAL CENTER | Age: 51
DRG: 003 | End: 2021-04-25
Attending: INTERNAL MEDICINE
Payer: MEDICARE

## 2021-04-25 PROBLEM — R25.9 ABNORMAL MOVEMENT: Status: ACTIVE | Noted: 2021-04-25

## 2021-04-25 PROBLEM — J90 PLEURAL EFFUSION, LEFT: Status: ACTIVE | Noted: 2021-04-25

## 2021-04-25 PROBLEM — D75.839 THROMBOCYTOSIS: Status: ACTIVE | Noted: 2021-04-25

## 2021-04-25 PROBLEM — R50.9 FEVER: Status: ACTIVE | Noted: 2021-04-25

## 2021-04-25 PROBLEM — R60.1 ANASARCA: Status: ACTIVE | Noted: 2021-04-25

## 2021-04-25 PROBLEM — J85.2 PULMONARY ABSCESS (HCC): Status: ACTIVE | Noted: 2021-04-25

## 2021-04-25 PROBLEM — J98.19 TRAPPED LUNG: Status: ACTIVE | Noted: 2021-04-25

## 2021-04-25 LAB
ABO GROUP BLD: NORMAL
AMMONIA PLAS-SCNC: 32 UMOL/L (ref 11–45)
ANION GAP SERPL CALC-SCNC: 7 MMOL/L (ref 7–16)
APPEARANCE UR: CLEAR
BACTERIA #/AREA URNS HPF: NEGATIVE /HPF
BARCODED ABORH UBTYP: 600
BARCODED ABORH UBTYP: 6200
BARCODED ABORH UBTYP: 6200
BARCODED PRD CODE UBPRD: NORMAL
BARCODED UNIT NUM UBUNT: NORMAL
BASOPHILS # BLD AUTO: 0.5 % (ref 0–1.8)
BASOPHILS # BLD: 0.06 K/UL (ref 0–0.12)
BILIRUB UR QL STRIP.AUTO: ABNORMAL
BLD GP AB SCN SERPL QL: NORMAL
BUN SERPL-MCNC: 17 MG/DL (ref 8–22)
CALCIUM SERPL-MCNC: 7.8 MG/DL (ref 8.5–10.5)
CHLORIDE SERPL-SCNC: 97 MMOL/L (ref 96–112)
CO2 SERPL-SCNC: 25 MMOL/L (ref 20–33)
COLOR UR: YELLOW
COMPONENT R 8504R: NORMAL
CREAT SERPL-MCNC: 0.34 MG/DL (ref 0.5–1.4)
CRP SERPL HS-MCNC: 21.07 MG/DL (ref 0–0.75)
EOSINOPHIL # BLD AUTO: 0 K/UL (ref 0–0.51)
EOSINOPHIL NFR BLD: 0 % (ref 0–6.9)
EPI CELLS #/AREA URNS HPF: NEGATIVE /HPF
ERYTHROCYTE [DISTWIDTH] IN BLOOD BY AUTOMATED COUNT: 57.9 FL (ref 35.9–50)
ERYTHROCYTE [SEDIMENTATION RATE] IN BLOOD BY WESTERGREN METHOD: >120 MM/HOUR (ref 0–20)
GGT SERPL-CCNC: 222 U/L (ref 7–34)
GLUCOSE SERPL-MCNC: 89 MG/DL (ref 65–99)
GLUCOSE UR STRIP.AUTO-MCNC: NEGATIVE MG/DL
HCT VFR BLD AUTO: 20.2 % (ref 37–47)
HGB BLD-MCNC: 6.6 G/DL (ref 12–16)
HGB BLD-MCNC: 7.8 G/DL (ref 12–16)
IMM GRANULOCYTES # BLD AUTO: 0.2 K/UL (ref 0–0.11)
IMM GRANULOCYTES NFR BLD AUTO: 1.5 % (ref 0–0.9)
INR PPP: 1.24 (ref 0.87–1.13)
KETONES UR STRIP.AUTO-MCNC: ABNORMAL MG/DL
LDH SERPL L TO P-CCNC: 284 U/L (ref 107–266)
LEUKOCYTE ESTERASE UR QL STRIP.AUTO: ABNORMAL
LYMPHOCYTES # BLD AUTO: 1 K/UL (ref 1–4.8)
LYMPHOCYTES NFR BLD: 7.5 % (ref 22–41)
MCH RBC QN AUTO: 30.3 PG (ref 27–33)
MCHC RBC AUTO-ENTMCNC: 32.7 G/DL (ref 33.6–35)
MCV RBC AUTO: 92.7 FL (ref 81.4–97.8)
MICRO URNS: ABNORMAL
MONOCYTES # BLD AUTO: 1.43 K/UL (ref 0–0.85)
MONOCYTES NFR BLD AUTO: 10.8 % (ref 0–13.4)
NEUTROPHILS # BLD AUTO: 10.61 K/UL (ref 2–7.15)
NEUTROPHILS NFR BLD: 79.7 % (ref 44–72)
NITRITE UR QL STRIP.AUTO: NEGATIVE
NRBC # BLD AUTO: 0 K/UL
NRBC BLD-RTO: 0 /100 WBC
PH UR STRIP.AUTO: 6.5 [PH] (ref 5–8)
PLATELET # BLD AUTO: 494 K/UL (ref 164–446)
PMV BLD AUTO: 8.8 FL (ref 9–12.9)
POTASSIUM SERPL-SCNC: 3.4 MMOL/L (ref 3.6–5.5)
POTASSIUM SERPL-SCNC: 3.7 MMOL/L (ref 3.6–5.5)
POTASSIUM SERPL-SCNC: 3.7 MMOL/L (ref 3.6–5.5)
POTASSIUM SERPL-SCNC: 3.8 MMOL/L (ref 3.6–5.5)
PRODUCT TYPE UPROD: NORMAL
PROT UR QL STRIP: 30 MG/DL
PROTHROMBIN TIME: 16 SEC (ref 12–14.6)
RBC # BLD AUTO: 2.18 M/UL (ref 4.2–5.4)
RBC # URNS HPF: ABNORMAL /HPF
RBC UR QL AUTO: ABNORMAL
RH BLD: NORMAL
SODIUM SERPL-SCNC: 129 MMOL/L (ref 135–145)
SP GR UR STRIP.AUTO: 1.02
UNIT STATUS USTAT: NORMAL
UROBILINOGEN UR STRIP.AUTO-MCNC: 2 MG/DL
WBC # BLD AUTO: 13.3 K/UL (ref 4.8–10.8)
WBC #/AREA URNS HPF: ABNORMAL /HPF
YEAST BUDDING URNS QL: PRESENT /HPF
YEAST HYPHAE #/AREA URNS HPF: PRESENT /HPF

## 2021-04-25 PROCEDURE — P9016 RBC LEUKOCYTES REDUCED: HCPCS

## 2021-04-25 PROCEDURE — 700111 HCHG RX REV CODE 636 W/ 250 OVERRIDE (IP): Performed by: INTERNAL MEDICINE

## 2021-04-25 PROCEDURE — 94003 VENT MGMT INPAT SUBQ DAY: CPT

## 2021-04-25 PROCEDURE — 82977 ASSAY OF GGT: CPT

## 2021-04-25 PROCEDURE — 82140 ASSAY OF AMMONIA: CPT

## 2021-04-25 PROCEDURE — 86923 COMPATIBILITY TEST ELECTRIC: CPT

## 2021-04-25 PROCEDURE — 94150 VITAL CAPACITY TEST: CPT

## 2021-04-25 PROCEDURE — 85610 PROTHROMBIN TIME: CPT

## 2021-04-25 PROCEDURE — 86901 BLOOD TYPING SEROLOGIC RH(D): CPT

## 2021-04-25 PROCEDURE — 83010 ASSAY OF HAPTOGLOBIN QUANT: CPT

## 2021-04-25 PROCEDURE — 83615 LACTATE (LD) (LDH) ENZYME: CPT

## 2021-04-25 PROCEDURE — 700102 HCHG RX REV CODE 250 W/ 637 OVERRIDE(OP): Performed by: INTERNAL MEDICINE

## 2021-04-25 PROCEDURE — 86140 C-REACTIVE PROTEIN: CPT

## 2021-04-25 PROCEDURE — 85025 COMPLETE CBC W/AUTO DIFF WBC: CPT

## 2021-04-25 PROCEDURE — 80048 BASIC METABOLIC PNL TOTAL CA: CPT

## 2021-04-25 PROCEDURE — 86850 RBC ANTIBODY SCREEN: CPT

## 2021-04-25 PROCEDURE — 700105 HCHG RX REV CODE 258: Performed by: INTERNAL MEDICINE

## 2021-04-25 PROCEDURE — 94799 UNLISTED PULMONARY SVC/PX: CPT

## 2021-04-25 PROCEDURE — 770022 HCHG ROOM/CARE - ICU (200)

## 2021-04-25 PROCEDURE — 84132 ASSAY OF SERUM POTASSIUM: CPT

## 2021-04-25 PROCEDURE — 36430 TRANSFUSION BLD/BLD COMPNT: CPT

## 2021-04-25 PROCEDURE — 71250 CT THORAX DX C-: CPT

## 2021-04-25 PROCEDURE — A9270 NON-COVERED ITEM OR SERVICE: HCPCS | Performed by: INTERNAL MEDICINE

## 2021-04-25 PROCEDURE — 302136 NUTRITION PUMP: Performed by: INTERNAL MEDICINE

## 2021-04-25 PROCEDURE — 81001 URINALYSIS AUTO W/SCOPE: CPT

## 2021-04-25 PROCEDURE — 86900 BLOOD TYPING SEROLOGIC ABO: CPT

## 2021-04-25 PROCEDURE — 85018 HEMOGLOBIN: CPT

## 2021-04-25 PROCEDURE — 85652 RBC SED RATE AUTOMATED: CPT

## 2021-04-25 PROCEDURE — 87040 BLOOD CULTURE FOR BACTERIA: CPT

## 2021-04-25 RX ORDER — POTASSIUM CHLORIDE 14.9 MG/ML
20 INJECTION INTRAVENOUS ONCE
Status: COMPLETED | OUTPATIENT
Start: 2021-04-25 | End: 2021-04-25

## 2021-04-25 RX ORDER — POTASSIUM CHLORIDE 7.45 MG/ML
10 INJECTION INTRAVENOUS ONCE
Status: COMPLETED | OUTPATIENT
Start: 2021-04-25 | End: 2021-04-25

## 2021-04-25 RX ADMIN — ENOXAPARIN SODIUM 40 MG: 40 INJECTION SUBCUTANEOUS at 04:48

## 2021-04-25 RX ADMIN — PROPOFOL 50 MCG/KG/MIN: 10 INJECTION, EMULSION INTRAVENOUS at 04:14

## 2021-04-25 RX ADMIN — DIVALPROEX SODIUM 500 MG: 125 CAPSULE ORAL at 04:48

## 2021-04-25 RX ADMIN — ACETAMINOPHEN 650 MG: 325 TABLET, FILM COATED ORAL at 23:45

## 2021-04-25 RX ADMIN — POTASSIUM CHLORIDE 20 MEQ: 14.9 INJECTION, SOLUTION INTRAVENOUS at 09:57

## 2021-04-25 RX ADMIN — DULOXETINE HYDROCHLORIDE 60 MG: 60 CAPSULE, DELAYED RELEASE ORAL at 17:41

## 2021-04-25 RX ADMIN — CEFEPIME 1 G: 1 INJECTION, POWDER, FOR SOLUTION INTRAMUSCULAR; INTRAVENOUS at 05:12

## 2021-04-25 RX ADMIN — FAMOTIDINE 20 MG: 20 TABLET ORAL at 17:41

## 2021-04-25 RX ADMIN — DIVALPROEX SODIUM 500 MG: 125 CAPSULE ORAL at 14:59

## 2021-04-25 RX ADMIN — FUROSEMIDE 40 MG: 10 INJECTION, SOLUTION INTRAMUSCULAR; INTRAVENOUS at 04:48

## 2021-04-25 RX ADMIN — RISPERIDONE 2 MG: 1 TABLET, FILM COATED ORAL at 04:49

## 2021-04-25 RX ADMIN — DIVALPROEX SODIUM 500 MG: 125 CAPSULE ORAL at 21:39

## 2021-04-25 RX ADMIN — RISPERIDONE 2 MG: 1 TABLET, FILM COATED ORAL at 18:00

## 2021-04-25 RX ADMIN — PROPOFOL 40 MCG/KG/MIN: 10 INJECTION, EMULSION INTRAVENOUS at 09:57

## 2021-04-25 RX ADMIN — DULOXETINE HYDROCHLORIDE 60 MG: 60 CAPSULE, DELAYED RELEASE ORAL at 04:48

## 2021-04-25 RX ADMIN — PAROXETINE HYDROCHLORIDE 10 MG: 20 TABLET, FILM COATED ORAL at 04:49

## 2021-04-25 RX ADMIN — Medication 100 MCG/HR: at 14:55

## 2021-04-25 RX ADMIN — CEFEPIME 1 G: 1 INJECTION, POWDER, FOR SOLUTION INTRAMUSCULAR; INTRAVENOUS at 15:05

## 2021-04-25 RX ADMIN — PROPOFOL 30 MCG/KG/MIN: 10 INJECTION, EMULSION INTRAVENOUS at 15:09

## 2021-04-25 RX ADMIN — POTASSIUM CHLORIDE 20 MEQ: 14.9 INJECTION, SOLUTION INTRAVENOUS at 03:38

## 2021-04-25 RX ADMIN — POTASSIUM CHLORIDE 10 MEQ: 7.46 INJECTION, SOLUTION INTRAVENOUS at 14:59

## 2021-04-25 RX ADMIN — PROPOFOL 30 MCG/KG/MIN: 10 INJECTION, EMULSION INTRAVENOUS at 21:29

## 2021-04-25 RX ADMIN — CEFEPIME 1 G: 1 INJECTION, POWDER, FOR SOLUTION INTRAMUSCULAR; INTRAVENOUS at 21:40

## 2021-04-25 RX ADMIN — FAMOTIDINE 20 MG: 10 INJECTION INTRAVENOUS at 04:49

## 2021-04-25 RX ADMIN — POTASSIUM CHLORIDE 20 MEQ: 14.9 INJECTION, SOLUTION INTRAVENOUS at 20:05

## 2021-04-25 RX ADMIN — FENTANYL CITRATE 100 MCG: 50 INJECTION, SOLUTION INTRAMUSCULAR; INTRAVENOUS at 04:48

## 2021-04-25 RX ADMIN — PROPOFOL 55 MCG/KG/MIN: 10 INJECTION, EMULSION INTRAVENOUS at 00:57

## 2021-04-25 ASSESSMENT — FIBROSIS 4 INDEX: FIB4 SCORE: 0.39

## 2021-04-25 ASSESSMENT — PULMONARY FUNCTION TESTS: FVC: 0

## 2021-04-25 ASSESSMENT — PAIN DESCRIPTION - PAIN TYPE
TYPE: ACUTE PAIN

## 2021-04-25 NOTE — PROGRESS NOTES
"Critical Care Progress Note    Date of admission  4/16/2021    Chief Complaint  50 y.o. female the past medical history of pseudotumor cerebri status post  shunt by Dr. Oh, chronic pain with history of placement of nerve stimulator, vasculitis, right anterior chest port for home IV meds with recurrent infection who presented 4/11/2021 with to [facility] with recurrent port infection and was initially treated with vancomycin and Zosyn and then changed to ceftaroline and subsequently switched to nafcillin when MSSA was identified.    Hospital Course  \"50 y.o. female the past medical history of pseudotumor cerebri status post  shunt by Dr. Oh, chronic pain with history of placement of nerve stimulator, vasculitis, right anterior chest port for home IV meds with recurrent infection who presented 4/11/2021 with to [facility] with recurrent port infection and was initially treated with vancomycin and Zosyn and then changed to ceftaroline and subsequently switched to nafcillin when MSSA was identified.  [facility] infectious disease is managing antibiotics.  Dr. Candelaria removed the port on 4/12.  Imaging suggest tricuspid valve endocarditis.  Lumbar puncture performed 4/14 showed pleocytosis with negative Gram stain and culture so far.  She had worsening leukocytosis with WBC up to 35K and chest CT yesterday revealed septic emboli with greater involvement in the right lung as well as a large right pleural effusion.  IR performed a thoracentesis and placed a small pigtail catheter in the right chest which is now at 20 cm of suction.  There were significant loculations and fluid analysis suggested empyema.  Patient subsequently developed increased work of breathing and was intubated and placed on the ventilator yesterday.  Hemoglobin dropped from 8.3-6.9 as they were considering TPA and dornase via this pigtail catheter and instead they transfused 1 unit of blood.  The plan was to consult thoracic surgery but their only " "surgeon is out of town till next week.  Dr. Oh consulted on 4/14 and recommended brain MRI and it was ordered but is still not performed (nerve stimulator not MRI compatible?).  Dr. Oh did not recommend removal of shunt at this time apparently.  The patient's had some hypotension while on the ventilator requiring titration of norepinephrine and vasopressin.  The patient is transferred to [facility] for surgery consultation and Dr. Albert Benedict excepted the patient along with Dr. Gonda on transfer.\"    4/16 admitted to ICU  4/17 VD 2, 2 FFP, pRBC overnight; new chest tube per gen surg  4/18 VD 3, TPa/Dornase with 1400 cc from R chest tube      Interval Problem Update  Neuro: on SAT doesn't track, wiggled feet spontaneously.  Sedation has been titrated to grimace, tachycardia and hypertension, vent dyssynchrony  HR: -130, PACs  SBP: , no pressors  Tmax: 101.3  GI: TF at goal, daily BM  I/O: 900 out, 50 out of L chest tube  Lines: TLC, PIV, nevarez, ETT, b/l chest tubes  Mobility: level 1  Resp: VD10, SBT RSBI >200.   Vte: lovenox  PPI/H2:pepcid  Antibx: cefepime d10.  4/17 neg cultures.      Lasix 40 daily  K scale  Neither chest tube tidaling-->Repeat chest CT  MICAH yest with TV and MV vegetations    Review of Systems  Review of Systems   Unable to perform ROS: Intubated        Vital Signs for last 24 hours   Temp:  [38.1 °C (100.6 °F)-38.2 °C (100.8 °F)] 38.1 °C (100.6 °F)  Pulse:  [] 114  Resp:  [18-44] 29  BP: ()/(41-98) 104/53  SpO2:  [82 %-100 %] 98 %    Hemodynamic parameters for last 24 hours       Respiratory Information for the last 24 hours  Vent Mode: APVCMV  Rate (breaths/min): 18  Vt Target (mL): 320  PEEP/CPAP: 8  MAP: 20  Control VTE (exp VT): 335    Physical Exam   Physical Exam  Constitutional:       Appearance: She is ill-appearing.      Interventions: She is sedated and intubated.   HENT:      Mouth/Throat:      Mouth: Mucous membranes are moist.   Eyes:      Pupils: Pupils are " equal, round, and reactive to light.   Cardiovascular:      Rate and Rhythm: Regular rhythm. Tachycardia present.      Heart sounds: Murmur present.   Pulmonary:      Effort: She is intubated.   Abdominal:      General: Bowel sounds are normal.      Tenderness: There is no abdominal tenderness. There is no guarding.   Musculoskeletal:      Right lower leg: Edema present.      Left lower leg: Edema present.      Comments: Generalized mild anasarca   Skin:     General: Skin is warm and dry.   Neurological:      Comments: Deeply sedated   Psychiatric:      Comments: Unable to assess         Medications  Current Facility-Administered Medications   Medication Dose Route Frequency Provider Last Rate Last Admin   • potassium chloride in water (KCL) ivpb **Administer in ICU only** 20 mEq  20 mEq Intravenous Once Drew Kong M.D. 50 mL/hr at 04/25/21 0338 20 mEq at 04/25/21 0338   • K+ Scale: Goal of 4.5  1 Each Intravenous Q6HRS Benoit Loya Jr., D.O.   1 Each at 04/25/21 0000   • enoxaparin (LOVENOX) inj 40 mg  40 mg Subcutaneous DAILY Benoit Loya Jr., D.O.   40 mg at 04/25/21 0448   • alteplase (Activase) 5 mg in NS 30 mL INTRAPLEURAL syringe  5 mg Intrapleural TWICE DAILY Benoit Loya Jr., D.O.   5 mg at 04/24/21 1419    And   • dornase alpha (PULMOZYME) 5 mg in NS 30 mL INTRAPLEURAL syringe  5 mg Intrapleural TWICE DAILY Benoit Loya Jr., D.O.   5 mg at 04/24/21 1419   • ceFEPIme (MAXIPIME) 1 g in  mL IVPB  1 g Intravenous Q8HRS Marlon Cox D.O. 200 mL/hr at 04/25/21 0512 1 g at 04/25/21 0512   • propofol (DIPRIVAN) injection  0-80 mcg/kg/min Intravenous Continuous Viraj Burgos M.D. 24.7 mL/hr at 04/25/21 0414 50 mcg/kg/min at 04/25/21 0414   • furosemide (LASIX) injection 40 mg  40 mg Intravenous Q DAY Viraj Burgos M.D.   40 mg at 04/25/21 0448   • fentaNYL (SUBLIMAZE) 50 mcg/mL in 50mL   Intravenous Continuous Viraj Burgos M.D. 2 mL/hr at 04/24/21 1320 100 mcg/hr at 04/24/21 1320    • Pharmacy Consult: Enteral tube insertion - review meds/change route/product selection  1 Each Other PHARMACY TO DOSE Elliott Salvador M.D.       • acetaminophen (Tylenol) tablet 650 mg  650 mg Enteral Tube Q4HRS PRN Elliott Salvador M.D.   650 mg at 04/24/21 1301   • magnesium hydroxide (MILK OF MAGNESIA) suspension 30 mL  30 mL Enteral Tube QDAY PRN Elliott Salvador M.D.        And   • senna-docusate (PERICOLACE or SENOKOT S) 8.6-50 MG per tablet 2 tablet  2 tablet Enteral Tube BID Elliott Salvador M.D.   Stopped at 04/23/21 1800    And   • polyethylene glycol/lytes (MIRALAX) PACKET 1 Packet  1 Packet Enteral Tube QDAY PRN Elliott Salvador M.D.        And   • bisacodyl (DULCOLAX) suppository 10 mg  10 mg Rectal QDAY PRN Elliott Salvador M.D.       • divalproex (DEPAKOTE SPRINKLE) capsule 500 mg  500 mg Enteral Tube Q8HRS Elliott Salvador M.D.   500 mg at 04/25/21 0448   • DULoxetine (CYMBALTA) capsule 60 mg  60 mg Enteral Tube BID Elliott Salvador M.D.   60 mg at 04/25/21 0448   • PARoxetine (PAXIL) tablet 10 mg  10 mg Enteral Tube QAM Elliott Salvador M.D.   10 mg at 04/25/21 0449   • risperiDONE (RISPERDAL) tablet 2 mg  2 mg Enteral Tube BID Elliott Salvador M.D.   2 mg at 04/25/21 0449   • Respiratory Therapy Consult   Nebulization Continuous RT Man Wahl M.D.       • famotidine (PEPCID) tablet 20 mg  20 mg Enteral Tube Q12HRS Man Wahl M.D.   20 mg at 04/24/21 1747    Or   • famotidine (PEPCID) injection 20 mg  20 mg Intravenous Q12HRS Man Wahl M.D.   20 mg at 04/25/21 0449   • MD Alert...ICU Electrolyte Replacement per Pharmacy   Other PHARMACY TO DOSE Man Wahl M.D.       • lidocaine (XYLOCAINE) 1 % injection 1-2 mL  1-2 mL Tracheal Tube Q30 MIN PRN Man B Richeson, M.D.       • ipratropium-albuterol (DUONEB) nebulizer solution  3 mL Nebulization Q2HRS PRN (RT) Man Wahl M.D.       • fentaNYL (SUBLIMAZE) injection 25 mcg  25 mcg Intravenous Q HOUR  PRN Man Wahl M.D.        Or   • fentaNYL (SUBLIMAZE) injection 50 mcg  50 mcg Intravenous Q HOUR PRN Man Wahl M.D.        Or   • fentaNYL (SUBLIMAZE) injection 100 mcg  100 mcg Intravenous Q HOUR PRN Man Wahl M.D.   100 mcg at 04/25/21 0448   • norepinephrine (Levophed) infusion 8 mg/250 mL (premix)  0-30 mcg/min Intravenous Continuous Man Wahl M.D.   Stopped at 04/23/21 1141       Fluids    Intake/Output Summary (Last 24 hours) at 4/25/2021 0513  Last data filed at 4/25/2021 0425  Gross per 24 hour   Intake 1727.18 ml   Output 2132 ml   Net -404.82 ml       Laboratory  Recent Labs     04/22/21  0830   ISTATAPH 7.534*   ISTATAPCO2 36.2   ISTATAPO2 63*   ISTATATCO2 32   [id number] 94   ISTATARTHCO3 30.5*   ISTATARTBE 7*   ISTATTEMP 38.5 C   ISTATFIO2 30   ISTATSPEC Arterial   ISTATAPHTC 7.511*   [id number] 70         Recent Labs     04/23/21  0518 04/23/21  0518 04/24/21  0410 04/24/21  0410 04/24/21  1309 04/24/21  1715 04/24/21  2350   SODIUM 134*  --  128*  --   --   --   --    POTASSIUM 3.3*   < > 2.9*   < > 3.3* 3.9 3.4*   CHLORIDE 100  --  94*  --   --   --   --    CO2 27  --  26  --   --   --   --    BUN 17  --  18  --   --   --   --    CREATININE 0.47*  --  0.40*  --   --   --   --    CALCIUM 7.7*  --  8.0*  --   --   --   --     < > = values in this interval not displayed.     Recent Labs     04/23/21  0518 04/24/21  0410   GLUCOSE 85 111*     Recent Labs     04/22/21  1146 04/23/21  0518 04/24/21  0410   WBC 20.3* 21.4* 16.6*   NEUTSPOLYS  --  83.00* 86.00*   LYMPHOCYTES  --  5.90* 5.20*   MONOCYTES  --  8.80 7.70   EOSINOPHILS  --  0.00 0.00   BASOPHILS  --  0.60 0.20     Recent Labs     04/22/21  1146 04/22/21  1146 04/23/21  0518 04/23/21  0518 04/23/21  1655 04/24/21  0410 04/24/21  1715   RBC 2.34*  --  2.32*  --   --  2.55*  --    HEMOGLOBIN 6.8*   < > 7.1*   < > 7.5* 7.8* 7.3*   HEMATOCRIT 20.2*  --  21.0*  --   --  23.8*  --    PLATELETCT 525*  --  507*  --    --  505*  --     < > = values in this interval not displayed.       Imaging  X-Ray:  I have personally reviewed the images and compared with prior images.  CT:    Reviewed  Echo:   Reviewed    Assessment/Plan  * Septic shock (HCC)- (present on admission)  Assessment & Plan  Shock resolved  MSSA endocarditis + B/L pleural empyema, cavitary pneumonia and bacteremia   Cefepime, extended course  Negative cultures since 4/17  Repeat cultures 4/25 due to fevers  Replace central line 4/21  Nataly ID recommendations appreciated  Bal 4/20 klebsiella  Worsening cavitary lesions on ct imaging  US positive for fluid near pain stimulator, Dr Vargas consulted for removal, too high risk   MICAH 4/23 with mitral and tricuspid vegetations    Empyema of right pleural space (HCC)- (present on admission)  Assessment & Plan  Right-sided effusion associated with infected catheter right anterior chest and septic emboli, possible empyema  Status post chest tube placement 4/16  At [facility] complicated by some bleeding  Upsized 4/18 Dr Benedict  Received TPA/dornase from 4/18-20.  Stopped b/c Hgb dropped requiring transfusion  Hydropneumothorax  R chest tube is not putting out fluid and not tidaling.  Likely no longer functional, will attempt to manipulate to try to get it working today  Repeat CT chest 4/25 to evaluate for fluid reaccumulation  Ultimately will need decortication, currently non-operative candidate due to her high surgical morbidity/mortality.  Consider re-evaluation for surgery in a few weeks if clinically improving      Acute bacterial endocarditis- (present on admission)  Assessment & Plan  Cultures growing MSSA at [facility]  Infected right anterior chest port/line removed  CTA chest 4/15 with multiple septic emboli worse on the right complicated by large right effusion with loculations  Echocardiogram 4/12 reveals echogenic mobile lesion on tricuspid valve RVSP 30, EF 55%  Extended antibiotic course, blood cultures negative on  4/17  Repeat cultures 4/25  No evidence based surgical indications (valvular CHF, difficult pathogen, etc) so no cardiovascular surgery consult is necessary at this time  MICAH on 4/23 with mitral and tricuspid vegetations    Acute respiratory failure with hypoxia (HCC)- (present on admission)  Assessment & Plan  Intubated 4/15 for respiratory failure at [facility]  Due to hydro/pneumo, trapped lung, lung abscesses  Lung protective ventilation strategies  Optimize oxygenation, ventilation, and acid base balance.   ABCEDF bundle  SAT/SBT   Chest tube to -20 cmH2O suction      Acute encephalopathy- (present on admission)  Assessment & Plan  Multifactorial including septic and metabolic  Status post lumbar puncture with cytosis, negative Gram stain and cultures NGTD   shunt for pseudotumor cerebri  Not candidate for MRI  Ct repeat no intracranial abnormalities on 4/21  EEG with encephalopathy and no seizures  Minimize sedation as able  Her problem list includes autoimmune cerebritis.  Need to clarify the history of this, as I don't see anything in her prior d/c summaries about an autoimmune cerebritis    Pneumonia  Assessment & Plan  MSSA septic emboli with empyema   BAL 4/20 klebsiella pneum  On cefepime  BAL from 4/24, >100k GNR    Bacteremia due to methicillin susceptible Staphylococcus aureus (MSSA)- (present on admission)  Assessment & Plan  Source presumed right anterior chest line/port with endocarditis  Recurrent line/port infection  Blood cultures positive for MSSA  Blood cultures NGTD 4/17, repeat 4/25 for fever  Cefepime, extended course. 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 MV Endocarditis as above        CSF pleocytosis- (present on admission)  Assessment & Plan  Status post lumbar puncture 4/13  WBC 53, Glucose 47, protein 97 Gram stain negative and culture negative so far  Neurosurgery aware,  shunt in place  MRI brain requested by  neurosurgery at [facility], not candidate    Repeat ct no embolic lesions  Abx changed to cefepime for CNS penetration    Acute blood loss anemia- (present on admission)  Assessment & Plan  Hgb dropped again after TPA/dornase on 4/25  No clear source of bleed  D/c TPA/dornase  Trend q12  Transfuse >7    Pseudotumor cerebri  Assessment & Plan   shunt in place    Abnormal LFTs  Assessment & Plan  Chronic alk phos elevation  Check GGT  Trend    Thrombocytopenia (HCC)- (present on admission)  Assessment & Plan  Sepsis induced  TEG adequate plt function    S/P  shunt- (present on admission)  Assessment & Plan  History of pseudotumor cerebri  History of  shunt by Dr. Oh  Unable to get brain MR due to her stimulator  4/14 lumbar puncture did reveal pleocytosis, Gram stain negative cultures NGTD  Follow CSF cultures, ID following  Repeat CT 4/23 no embolic lesions    Thrombocytosis (HCC)  Assessment & Plan  Probably reactive    Anasarca  Assessment & Plan  Mild diffuse peripheral edema  Probably due to critical illness, hypoalbuminemia, prior volume resuscitation  Gentle diuresis    Pleural effusion, left  Assessment & Plan  Loculated fluid  Probably parapneumonic fluid from abscesses  CT guided chest tube placed 4/23, fluid sent  Lymph predominant, NGTD  TPA/dornase one dose on 4/24.  Hold d/t dropping Hgb  CT chest today to follow progression    Trapped lung  Assessment & Plan  hydropneumo with rind/trapped lung  Needs decort eventually, not currently an operative candidate    Pulmonary abscess (HCC)  Assessment & Plan  Septic emboli from TV endocarditis    Abnormal movement  Assessment & Plan  EEG done 4/23, no seizures    Fever  Assessment & Plan  Worsening fever curse 4/25  Repeat blood cultures, UA, chest CT  BAL from 4/24 with lactose fermenting GNR-possibly the same klebsiella but it's concerning that it's growing so briskyl as she has theoretically been on appropriate antibiotics for 4  days    Hypokalemia  Assessment & Plan  K-scale    History of vasculitis- (present on admission)  Assessment & Plan  Monitor    Prolonged Q-T interval on ECG- (present on admission)  Assessment & Plan  Avoid QT prolonging agents as able  Optimize electrolytes  Cardiac monitoring  EKG    History of complicated migraines- (present on admission)  Assessment & Plan  Home regimen    Hyponatremia  Assessment & Plan  Mild, monitor    H/O: CVA (cerebrovascular accident)- (present on admission)  Assessment & Plan  Monitor    Chronic pain syndrome- (present on admission)  Assessment & Plan  Chronic back pain and intractable headaches  History of nerve stimulator   Dr Vargas consulted, no plan to remove stimulator due to her clinical instability     Very complex case of a 50-year-old female with an extensive history of chronic pain due to migraines and back pain with a nerve stimulator and outpatient infusions of Toradol and Benadryl.  She has previously been diagnosed with pseudotumor cerebri and has a  shunt in place.  She had a port for her outpatient infusions which became infected and resulted in associated endocarditis with pulmonary abscesses and empyema.  She may also have seeded her  shunt, as an LP showed CSF pleocytosis.  She remains with severe encephalopathy and respiratory failure with an exceedingly complex pleural space bilaterally.  Unfortunately, due to how critically ill she is, she is a poor surgical candidate for surgeries such as empyema decortication, pain stimulator removal, or  shunt removal.  She does not tolerate SAT/SBT's due to severe tachypnea and tachycardia.  My impression at this point is that her only chance of survival would be an extended course of antibiotics while receiving mechanical ventilatory support, and if she has clinical improvement the above described surgeries could be considered.  Her risk of mortality from this illness is extremely high.    VTE:  Heparin SQ  Ulcer: H2  Antagonist  Lines: Central Line  Ongoing indication addressed, Arterial Line  Ongoing indication addressed and Lopez Catheter  Ongoing indication addressed    I have performed a physical exam and reviewed and updated ROS and Plan today (4/25/2021). In review of yesterday's note (4/24/2021), there are no changes except as documented above.       Discussed patient condition and risk of morbidity and/or mortality with RN, RT, Pharmacy, Dietary, Code status disscussed, Charge nurse / hot rounds and general surgery.      The patient remains critically ill requiring mechanical ventilation, ongoing management of very complex pleural space bilaterally, dropping hemoglobin requiring blood transfusions.  Critical care time = 85 minutes in directly providing and coordinating critical care and extensive data review.  No time overlap and excludes procedures

## 2021-04-25 NOTE — CARE PLAN
Ventilator Daily Summary    Vent Day #  10  Ventilator settings changed this shift:  no  Weaning trials:  yes  Respiratory Procedures:  no  Plan: Continue current ventilator settings and wean mechanical ventilation as tolerated per physician orders.   [phone]

## 2021-04-25 NOTE — CARE PLAN
Problem: Safety - Medical Restraint  Goal: Remains free of injury from restraints (Restraint for Interference with Medical Device)  Description: INTERVENTIONS:  1. Determine that other, less restrictive measures have been tried or would not be effective before applying the restraint  2. Evaluate the patient's condition at the time of restraint application  3. Inform patient/family regarding the reason for restraint  4. Q2H: Monitor safety, psychosocial status, comfort, nutrition and hydration  Flowsheets (Taken 4/25/2021 4158)  Addressed this shift: Remains free of injury from restraints (restraint for interference with medical device):   Determine that other, less restrictive measures have been tried or would not be effective before applying the restraint   Evaluate the patient's condition at the time of restraint application   Inform patient/family regarding the reason for restraint   Every 2 hours: Monitor safety, psychosocial status, comfort, nutrition and hydration

## 2021-04-25 NOTE — RESPIRATORY CARE
Ventilator Daily Summary    Vent Day # 10    Ventilator settings changed this shift: , vt 320 cc, Peep 8, Fio2 35%    Weaning trials: ON Hold    Respiratory Procedures:    Plan: Continue current ventilator settings and wean mechanical ventilation as tolerated per physician orders.

## 2021-04-26 ENCOUNTER — APPOINTMENT (OUTPATIENT)
Dept: RADIOLOGY | Facility: MEDICAL CENTER | Age: 51
DRG: 003 | End: 2021-04-26
Attending: INTERNAL MEDICINE
Payer: MEDICARE

## 2021-04-26 LAB
ALBUMIN SERPL BCP-MCNC: 1.8 G/DL (ref 3.2–4.9)
ALBUMIN/GLOB SERPL: 0.4 G/DL
ALP SERPL-CCNC: 323 U/L (ref 30–99)
ALT SERPL-CCNC: 13 U/L (ref 2–50)
ANION GAP SERPL CALC-SCNC: 5 MMOL/L (ref 7–16)
AST SERPL-CCNC: 27 U/L (ref 12–45)
BACTERIA BRONCH AEROBE CULT: ABNORMAL
BACTERIA BRONCH AEROBE CULT: ABNORMAL
BACTERIA FLD AEROBE CULT: NORMAL
BASE EXCESS BLDA CALC-SCNC: 1 MMOL/L (ref -4–3)
BASOPHILS # BLD AUTO: 0.5 % (ref 0–1.8)
BASOPHILS # BLD: 0.07 K/UL (ref 0–0.12)
BILIRUB SERPL-MCNC: 1.2 MG/DL (ref 0.1–1.5)
BODY TEMPERATURE: ABNORMAL DEGREES
BREATHS SETTING VENT: 18
BUN SERPL-MCNC: 12 MG/DL (ref 8–22)
CALCIUM SERPL-MCNC: 8 MG/DL (ref 8.5–10.5)
CHLORIDE SERPL-SCNC: 100 MMOL/L (ref 96–112)
CO2 BLDA-SCNC: 25 MMOL/L (ref 20–33)
CO2 SERPL-SCNC: 25 MMOL/L (ref 20–33)
CREAT SERPL-MCNC: 0.26 MG/DL (ref 0.5–1.4)
CRP SERPL HS-MCNC: 14.06 MG/DL (ref 0–0.75)
DELSYS IDSYS: ABNORMAL
END TIDAL CARBON DIOXIDE IECO2: 30 MMHG
EOSINOPHIL # BLD AUTO: 0 K/UL (ref 0–0.51)
EOSINOPHIL NFR BLD: 0 % (ref 0–6.9)
ERYTHROCYTE [DISTWIDTH] IN BLOOD BY AUTOMATED COUNT: 61.5 FL (ref 35.9–50)
GLOBULIN SER CALC-MCNC: 4.8 G/DL (ref 1.9–3.5)
GLUCOSE SERPL-MCNC: 109 MG/DL (ref 65–99)
GRAM STN SPEC: ABNORMAL
GRAM STN SPEC: NORMAL
HBV SURFACE AG SER QL: NORMAL
HCO3 BLDA-SCNC: 24.5 MMOL/L (ref 17–25)
HCT VFR BLD AUTO: 22.7 % (ref 37–47)
HCV AB SER QL: NORMAL
HGB BLD-MCNC: 7.1 G/DL (ref 12–16)
HGB BLD-MCNC: 7.4 G/DL (ref 12–16)
HIV 1+2 AB+HIV1 P24 AG SERPL QL IA: NORMAL
HOROWITZ INDEX BLDA+IHG-RTO: 180 MM[HG]
IMM GRANULOCYTES # BLD AUTO: 0.23 K/UL (ref 0–0.11)
IMM GRANULOCYTES NFR BLD AUTO: 1.7 % (ref 0–0.9)
LYMPHOCYTES # BLD AUTO: 0.9 K/UL (ref 1–4.8)
LYMPHOCYTES NFR BLD: 6.7 % (ref 22–41)
MAGNESIUM SERPL-MCNC: 1.7 MG/DL (ref 1.5–2.5)
MCH RBC QN AUTO: 30.6 PG (ref 27–33)
MCHC RBC AUTO-ENTMCNC: 32.6 G/DL (ref 33.6–35)
MCV RBC AUTO: 93.8 FL (ref 81.4–97.8)
MODE IMODE: ABNORMAL
MONOCYTES # BLD AUTO: 1.21 K/UL (ref 0–0.85)
MONOCYTES NFR BLD AUTO: 9 % (ref 0–13.4)
NEUTROPHILS # BLD AUTO: 11.1 K/UL (ref 2–7.15)
NEUTROPHILS NFR BLD: 82.1 % (ref 44–72)
NRBC # BLD AUTO: 0 K/UL
NRBC BLD-RTO: 0 /100 WBC
O2/TOTAL GAS SETTING VFR VENT: 30 %
PCO2 BLDA: 31.3 MMHG (ref 26–37)
PCO2 TEMP ADJ BLDA: 32.4 MMHG (ref 26–37)
PEEP END EXPIRATORY PRESSURE IPEEP: 8 CMH20
PH BLDA: 7.5 [PH] (ref 7.4–7.5)
PH TEMP ADJ BLDA: 7.49 [PH] (ref 7.4–7.5)
PHOSPHATE SERPL-MCNC: 2.5 MG/DL (ref 2.5–4.5)
PLATELET # BLD AUTO: 502 K/UL (ref 164–446)
PMV BLD AUTO: 9 FL (ref 9–12.9)
PO2 BLDA: 54 MMHG (ref 64–87)
PO2 TEMP ADJ BLDA: 57 MMHG (ref 64–87)
POTASSIUM SERPL-SCNC: 3.2 MMOL/L (ref 3.6–5.5)
POTASSIUM SERPL-SCNC: 3.2 MMOL/L (ref 3.6–5.5)
POTASSIUM SERPL-SCNC: 3.6 MMOL/L (ref 3.6–5.5)
POTASSIUM SERPL-SCNC: 3.8 MMOL/L (ref 3.6–5.5)
PREALB SERPL-MCNC: 11.7 MG/DL (ref 18–38)
PROT SERPL-MCNC: 6.6 G/DL (ref 6–8.2)
RBC # BLD AUTO: 2.42 M/UL (ref 4.2–5.4)
SAO2 % BLDA: 91 % (ref 93–99)
SIGNIFICANT IND 70042: ABNORMAL
SIGNIFICANT IND 70042: NORMAL
SITE SITE: ABNORMAL
SITE SITE: NORMAL
SODIUM SERPL-SCNC: 130 MMOL/L (ref 135–145)
SOURCE SOURCE: ABNORMAL
SOURCE SOURCE: NORMAL
SPECIMEN DRAWN FROM PATIENT: ABNORMAL
TIDAL VOLUME IVT: 320 ML
WBC # BLD AUTO: 13.5 K/UL (ref 4.8–10.8)

## 2021-04-26 PROCEDURE — 85018 HEMOGLOBIN: CPT

## 2021-04-26 PROCEDURE — 770022 HCHG ROOM/CARE - ICU (200)

## 2021-04-26 PROCEDURE — 36600 WITHDRAWAL OF ARTERIAL BLOOD: CPT

## 2021-04-26 PROCEDURE — 80053 COMPREHEN METABOLIC PANEL: CPT

## 2021-04-26 PROCEDURE — 700111 HCHG RX REV CODE 636 W/ 250 OVERRIDE (IP): Performed by: INTERNAL MEDICINE

## 2021-04-26 PROCEDURE — A9270 NON-COVERED ITEM OR SERVICE: HCPCS | Performed by: INTERNAL MEDICINE

## 2021-04-26 PROCEDURE — 86140 C-REACTIVE PROTEIN: CPT

## 2021-04-26 PROCEDURE — 700102 HCHG RX REV CODE 250 W/ 637 OVERRIDE(OP): Performed by: INTERNAL MEDICINE

## 2021-04-26 PROCEDURE — 83735 ASSAY OF MAGNESIUM: CPT

## 2021-04-26 PROCEDURE — 87389 HIV-1 AG W/HIV-1&-2 AB AG IA: CPT

## 2021-04-26 PROCEDURE — 84134 ASSAY OF PREALBUMIN: CPT

## 2021-04-26 PROCEDURE — 71045 X-RAY EXAM CHEST 1 VIEW: CPT

## 2021-04-26 PROCEDURE — 94003 VENT MGMT INPAT SUBQ DAY: CPT

## 2021-04-26 PROCEDURE — 84478 ASSAY OF TRIGLYCERIDES: CPT

## 2021-04-26 PROCEDURE — 700105 HCHG RX REV CODE 258: Performed by: INTERNAL MEDICINE

## 2021-04-26 PROCEDURE — 94799 UNLISTED PULMONARY SVC/PX: CPT

## 2021-04-26 PROCEDURE — 82803 BLOOD GASES ANY COMBINATION: CPT

## 2021-04-26 PROCEDURE — 99291 CRITICAL CARE FIRST HOUR: CPT | Performed by: INTERNAL MEDICINE

## 2021-04-26 PROCEDURE — 85025 COMPLETE CBC W/AUTO DIFF WBC: CPT

## 2021-04-26 PROCEDURE — 84100 ASSAY OF PHOSPHORUS: CPT

## 2021-04-26 PROCEDURE — 84132 ASSAY OF SERUM POTASSIUM: CPT | Mod: 91

## 2021-04-26 RX ORDER — POTASSIUM CHLORIDE 7.45 MG/ML
10 INJECTION INTRAVENOUS ONCE
Status: COMPLETED | OUTPATIENT
Start: 2021-04-26 | End: 2021-04-26

## 2021-04-26 RX ORDER — POTASSIUM CHLORIDE 14.9 MG/ML
20 INJECTION INTRAVENOUS ONCE
Status: COMPLETED | OUTPATIENT
Start: 2021-04-26 | End: 2021-04-26

## 2021-04-26 RX ORDER — MAGNESIUM SULFATE HEPTAHYDRATE 40 MG/ML
2 INJECTION, SOLUTION INTRAVENOUS ONCE
Status: COMPLETED | OUTPATIENT
Start: 2021-04-26 | End: 2021-04-26

## 2021-04-26 RX ORDER — FUROSEMIDE 10 MG/ML
40 INJECTION INTRAMUSCULAR; INTRAVENOUS
Status: DISCONTINUED | OUTPATIENT
Start: 2021-04-26 | End: 2021-04-27

## 2021-04-26 RX ADMIN — FENTANYL CITRATE 100 MCG: 50 INJECTION, SOLUTION INTRAMUSCULAR; INTRAVENOUS at 12:02

## 2021-04-26 RX ADMIN — PROPOFOL 40 MCG/KG/MIN: 10 INJECTION, EMULSION INTRAVENOUS at 18:17

## 2021-04-26 RX ADMIN — CEFEPIME 1 G: 1 INJECTION, POWDER, FOR SOLUTION INTRAMUSCULAR; INTRAVENOUS at 15:07

## 2021-04-26 RX ADMIN — PROPOFOL 35 MCG/KG/MIN: 10 INJECTION, EMULSION INTRAVENOUS at 02:06

## 2021-04-26 RX ADMIN — PROPOFOL 30 MCG/KG/MIN: 10 INJECTION, EMULSION INTRAVENOUS at 09:09

## 2021-04-26 RX ADMIN — POTASSIUM CHLORIDE 20 MEQ: 14.9 INJECTION, SOLUTION INTRAVENOUS at 16:12

## 2021-04-26 RX ADMIN — FENTANYL CITRATE 100 MCG: 50 INJECTION, SOLUTION INTRAMUSCULAR; INTRAVENOUS at 17:08

## 2021-04-26 RX ADMIN — DIVALPROEX SODIUM 500 MG: 125 CAPSULE ORAL at 21:34

## 2021-04-26 RX ADMIN — CEFEPIME 1 G: 1 INJECTION, POWDER, FOR SOLUTION INTRAMUSCULAR; INTRAVENOUS at 21:33

## 2021-04-26 RX ADMIN — RISPERIDONE 2 MG: 1 TABLET, FILM COATED ORAL at 17:07

## 2021-04-26 RX ADMIN — FAMOTIDINE 20 MG: 20 TABLET ORAL at 17:07

## 2021-04-26 RX ADMIN — ACETAMINOPHEN 650 MG: 325 TABLET, FILM COATED ORAL at 19:45

## 2021-04-26 RX ADMIN — FUROSEMIDE 40 MG: 10 INJECTION, SOLUTION INTRAMUSCULAR; INTRAVENOUS at 05:18

## 2021-04-26 RX ADMIN — DIVALPROEX SODIUM 500 MG: 125 CAPSULE ORAL at 05:19

## 2021-04-26 RX ADMIN — FAMOTIDINE 20 MG: 20 TABLET ORAL at 05:18

## 2021-04-26 RX ADMIN — DIVALPROEX SODIUM 500 MG: 125 CAPSULE ORAL at 15:06

## 2021-04-26 RX ADMIN — POTASSIUM CHLORIDE 10 MEQ: 7.46 INJECTION, SOLUTION INTRAVENOUS at 17:14

## 2021-04-26 RX ADMIN — MAGNESIUM SULFATE IN WATER 2 G: 40 INJECTION, SOLUTION INTRAVENOUS at 16:12

## 2021-04-26 RX ADMIN — Medication 150 MCG/HR: at 16:34

## 2021-04-26 RX ADMIN — DULOXETINE HYDROCHLORIDE 60 MG: 60 CAPSULE, DELAYED RELEASE ORAL at 17:09

## 2021-04-26 RX ADMIN — FUROSEMIDE 40 MG: 10 INJECTION, SOLUTION INTRAMUSCULAR; INTRAVENOUS at 16:15

## 2021-04-26 RX ADMIN — POTASSIUM CHLORIDE 10 MEQ: 7.46 INJECTION, SOLUTION INTRAVENOUS at 08:26

## 2021-04-26 RX ADMIN — PROPOFOL 40 MCG/KG/MIN: 10 INJECTION, EMULSION INTRAVENOUS at 15:06

## 2021-04-26 RX ADMIN — POTASSIUM CHLORIDE 10 MEQ: 7.46 INJECTION, SOLUTION INTRAVENOUS at 21:44

## 2021-04-26 RX ADMIN — PROPOFOL 40 MCG/KG/MIN: 10 INJECTION, EMULSION INTRAVENOUS at 22:33

## 2021-04-26 RX ADMIN — RISPERIDONE 2 MG: 1 TABLET, FILM COATED ORAL at 05:18

## 2021-04-26 RX ADMIN — DULOXETINE HYDROCHLORIDE 60 MG: 60 CAPSULE, DELAYED RELEASE ORAL at 05:18

## 2021-04-26 RX ADMIN — PAROXETINE HYDROCHLORIDE 10 MG: 20 TABLET, FILM COATED ORAL at 05:18

## 2021-04-26 RX ADMIN — CEFEPIME 1 G: 1 INJECTION, POWDER, FOR SOLUTION INTRAMUSCULAR; INTRAVENOUS at 05:19

## 2021-04-26 RX ADMIN — POTASSIUM CHLORIDE 20 MEQ: 14.9 INJECTION, SOLUTION INTRAVENOUS at 19:39

## 2021-04-26 RX ADMIN — POTASSIUM CHLORIDE 20 MEQ: 14.9 INJECTION, SOLUTION INTRAVENOUS at 00:36

## 2021-04-26 RX ADMIN — ENOXAPARIN SODIUM 40 MG: 40 INJECTION SUBCUTANEOUS at 05:19

## 2021-04-26 ASSESSMENT — PAIN DESCRIPTION - PAIN TYPE
TYPE: ACUTE PAIN

## 2021-04-26 ASSESSMENT — FIBROSIS 4 INDEX
FIB4 SCORE: 0.72
FIB4 SCORE: 0.72

## 2021-04-26 NOTE — ASSESSMENT & PLAN NOTE
Improved   Blood cultures repeated 5/5  Will need to revisit with NSGY regarding removal of spinal stimulator  On cefepime  ID following    Follow up CT's early next week

## 2021-04-26 NOTE — ASSESSMENT & PLAN NOTE
Parapneumonic fluid from abscesses  CT guided chest tube placed 4/23  TPA/dornase one dose on 4/24.  Hold d/t dropping Hgb  CT chest 4/25 with resolution of fluid  CT clamped 4/29-no significant accumulation of fluid so d/c 4/30  Monitor for now radiographically-chest x-ray is improving, repeat PRN  Mobilize as much as able and continue nutritional support, diuresis as needed

## 2021-04-26 NOTE — CARE PLAN
Problem: Nutritional:  Goal: Nutrition support tolerated and meeting greater than 85% of estimated needs  Outcome: PROGRESSING AS EXPECTED   Impact Peptide 1.5 @ 40 mL/hr( (rate with Propofol).

## 2021-04-26 NOTE — CARE PLAN
Problem: Safety - Medical Restraint  Goal: Remains free of injury from restraints (Restraint for Interference with Medical Device)  Description: INTERVENTIONS:  1. Determine that other, less restrictive measures have been tried or would not be effective before applying the restraint  2. Evaluate the patient's condition at the time of restraint application  3. Inform patient/family regarding the reason for restraint  4. Q2H: Monitor safety, psychosocial status, comfort, nutrition and hydration  Outcome: PROGRESSING AS EXPECTED  Flowsheets (Taken 4/25/2021 0555 by Deanna Lacy, R.N.)  Addressed this shift: Remains free of injury from restraints (restraint for interference with medical device):   Determine that other, less restrictive measures have been tried or would not be effective before applying the restraint   Evaluate the patient's condition at the time of restraint application   Inform patient/family regarding the reason for restraint   Every 2 hours: Monitor safety, psychosocial status, comfort, nutrition and hydration     Problem: Skin Integrity  Goal: Risk for impaired skin integrity will decrease  Outcome: PROGRESSING AS EXPECTED     Problem: Pain Management  Goal: Pain level will decrease to patient's comfort goal  Outcome: PROGRESSING AS EXPECTED  Flowsheets (Taken 4/25/2021 2000)  Critical-Care Pain Observation Score: 1

## 2021-04-26 NOTE — CARE PLAN
Ventilator Daily Summary    Vent Day #  11  Ventilator settings changed this shift:  no  Weaning trials:  no  Respiratory Procedures:  no  Plan: Continue current ventilator settings and wean mechanical ventilation as tolerated per physician orders.   [phone]

## 2021-04-26 NOTE — PROGRESS NOTES
Infectious Disease Daily Progress Note    Date of Service  4/26/2021    Chief Complaint  Cefepime 4/23 to present.  PRIOR Rx:   Zosyn 4/22-4/23  Previous: Unasyn, Zosyn, Vanco, Daptomycin  Ceftaroline: start 4/13-4/15  Nafcillin 2g Q4 hrs 4/15-4/23 4/14: Unable to give name of previous NSG or neurologist. Seems confused, but able to say some sentences fluently.   4/15: Line cultures now growing MSSA. As well as from the blood. Repeat blood culture 4/13+ in both sets. Patient has worsening confusion. CSF fluid analyses suggest pleocytosis. Cultures are no growth from the CSF. Chest CT was ordered this morning indicating a loculated right pleural effusion as well as bilateral septic emboli. Dr. Mack spoke with Dr. Oh yesterday and no surgical intervention unless clinical deterioration.  4/16: Increased respiratory distress.  Tachypneic.  CT with loculated collection.  Dr Resendiz not available.  No thoracic surgeon available.  Plans to have pulmonary place CT.  Transferring to ICU.  4/17: Transferred to [facility] last night. Hgb dropped to 6.4 requiring PRBC transfusion. Requiring 2 pressors. Fio2 50%. Febrile 38.8. Pending OR decortication of empyema and hemothorax. [facility] at [facility] shows 8,371 WBC, ,000, 74% N  4/18: Chest tube was upsized by surgery yesterday, no decortication. WBC 22k. Fio2 40%. Vasopressin. Levophed down to 2mcg. Afebrile 24 hrs. Last fever 38.6 on 4/16.   4/19: Still on vent. Levo 3 mcg, vasopressin. Afebrile 72 hours. WBC 21k. BC 4/17 NGTD x 48 hours. Unable to do MRI brain given neurostimulator per RN.   4/20: Remaining afebrile. Hb 6.2 and undergoing transfusion today.WBC 24,100, 5% bands.1700 ml CT output. Copious bloody ET secretions. No pressors. Receiving dornase and TPA per CT. Right pleural effusion not large per CXR today.   4/21: WBC 31k. Bronchoscopy yesterday showing blood. Cultures sent. TPA on hold per RN due to arvind blood from chest tube requiring PRBC  transfusion for hgb 6. Pending chest CT today. Per , spinal stimulator is non-MRI compatible. Levophed 10mcg. 30% Fio2. Afebrile.   4/22: Fever 101.3 this am. WBC 20,300 down from 32,200 yesterday. BAL growing Klebsiella pneumoniae but susceptibility panel not set up until today. CTA of brain shows no lesion. CT of chest shows enlarging cavitary lung lesions bilat and large loculated new left pleural effusion and large hydropneumothorax on right. TPA & dornase infusions of right chest ended 4/20 after considerable bleeding requiring transfusion. Hb now down again to 6.8.  4/23: WBC 23k. Fio2 40%. Tmax 38.1. US of stiumlator shows small fluid collection but no drainable abscess. Pending MICAH today. Pending L chest tube placement  4/24: Continue fever 100.8-101.8. WBC 16,600. MICAH shows large vegetations on both tricuspid valve and mitral valve with mild TR & MR.  No aortic root abscess. Left chest tube placed yesterday yielding 8 ml of old bloody fluid. Bronch today revealed copious thick maroon secretions in distal trachea and both mainstem bronchi. On the right these were obstructive. Remaining off pressors. Last positive blood cultures (MSSA) were 4/16, negative 4/17.  4/25: Fever 100.6 this AM. wBC 13,300. Hb 6.6. CT: right hydropneumothorax increasing, right bronchopleural fistula, mediastinal shift ot the left , multiple cavitary pulmonary nodules, 3.1 cm left basilar mass, splenomegaly. CT of head shows dural thickening over the clivus. Lac+ GNR >100,000 col/ml growing from BAL of 4/24, presumed Klebsiella. Left peural fluid culture negative from 4/23.  4/26: Fever 100.4 last night. WBC 13,500. Hb 7.4. Plts 502,000. ESR >120. UA fairly clear except 30 mg% protein, 2-5 wbc and rare rbc. CXR unchanged except more prominent pulmonary edema. GNR in BAL may be a different species of Klebsiella, and resistant to ampicillin & tmp-sulfa; confirmation pending.  Review of Systems  Review of Systems   Unable to  perform ROS: Intubated      Physical Exam  Temp:  [37.5 °C (99.5 °F)-38 °C (100.4 °F)] 37.7 °C (99.9 °F)  Pulse:  [] 121  Resp:  [19-32] 23  BP: ()/(46-84) 103/46  SpO2:  [96 %-100 %] 96 % On FIO2 35%, PEEP 5.     Physical Exam  Constitutional:       Comments: Intubated sedated . Exam unchanged  HENT:      Head: Normocephalic. Gaze conjugate. No chemosis or conjunctival injection.  Neck supple.     Comments: ET tube. Oral mucosae moist.   Cardiovascular:      Rate and Rhythm: Normal rate and regular rhythm.      Heart sounds: No murmur.   Pulmonary: O2 sat 93% on FIO2 30%, PEEP 8. AC 18, RR 31.     Comments: Diminished R side with rales. R chest tube with scant blood. Left chest tube.  Abdominal:      General: Abdomen is flat. Bowel sounds are normal. There is no distension or apparent tenderness.   Musculoskeletal:         General: 1+ pedal edema. Embolic lesions on right foot.  Skin:     Comments: Bilat chest tubes. Stimulator palpable, non indurated. No rash.    Laboratory  Recent Labs     04/24/21  0410 04/24/21  1715 04/25/21  0500 04/25/21  1755 04/26/21  0515   WBC 16.6*  --  13.3*  --  13.5*   RBC 2.55*  --  2.18*  --  2.42*   HEMOGLOBIN 7.8*   < > 6.6* 7.8* 7.4*   HEMATOCRIT 23.8*  --  20.2*  --  22.7*   MCV 93.3  --  92.7  --  93.8   MCH 30.6  --  30.3  --  30.6   MCHC 32.8*  --  32.7*  --  32.6*   RDW 57.1*  --  57.9*  --  61.5*   PLATELETCT 505*  --  494*  --  502*   MPV 9.0  --  8.8*  --  9.0    < > = values in this interval not displayed.     Recent Labs     04/24/21  0410 04/24/21  1309 04/25/21  0500 04/25/21  1250 04/25/21  1812 04/25/21  2345 04/26/21  0515   SODIUM 128*  --  129*  --   --   --  130*   POTASSIUM 2.9*   < > 3.7   < > 3.7 3.6 3.8   CHLORIDE 94*  --  97  --   --   --  100   CO2 26  --  25  --   --   --  25   GLUCOSE 111*  --  89  --   --   --  109*   BUN 18  --  17  --   --   --  12   CREATININE 0.40*  --  0.34*  --   --   --  0.26*   CALCIUM 8.0*  --  7.8*  --   --   --   8.0*    < > = values in this interval not displayed.     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       - Acute left empyema s/p chest tube placement 4/23  -Multiple acute septic pulmonary emboli bilaterally  -Acute bilateral pneumonia due to above     --Pulmonary edema  -Pseudotumor cerebri with underlying  shunt: possible arachnoiditis  -Chronic migraine headaches  -History of autoimmune vasculitis  -Elevated alkaline phosphatase & bilirubin  -Acute encephalopathy due to sepsis      - anemia due to above      - New secondary Klebsiella pneumoniae pneumonia (presumed VAP)     Plan   Patient has evidence of disseminated staphylococcal sepsis source from her line with infection to her lungs, tricuspid & mitral valves and  bilat empyema requiring transfer to [facility] for decortication due to lack of CT surgery support at \[facility]".  However not a surgical candidate after all. Now with bilateral cavitary lung abscess and empyema. Sputum cultures now growing additional Kleb. May be a candidate for decortication in the future if she stabilizes, but thoracic surgery service has signed off for now.  Persistent fever likely attributable to empyema and multiple lung abscesses.   Consider MRI of brain when clinically feasible. Spinal stimulator may prevent MRI.   - Pt needs CNS coverage for initial MSSA infection, so now on Cefepime 1gm Q8hrs  - Cefepime covers new Klebsiella sp in BAL so no change today.   - US of spinal stimulator shows ill defined fluid collection, but undrainable. NSGY does not recommend stiumulator removal due to instability  - central line exchanged 4/22  - initial CSF cultures has pleocytosis,  but no culture growth  - chest tube upsized by surgery 4/17.   - blood culture from 4/14 at [facility] positive. Admission blood cultures here on 4/16 both positive.  4/17 blood cultures NGTD  - Central line now has been changed 4/22  - CSF findings on 4/13:  63 wbc, 82% PMN, protein 97.  - original infected port removed 4/12  - vent management per pulmonology      Condition remains critically ill      45 min spent with 50% face to face care.  Thank you for this consult.

## 2021-04-26 NOTE — PROGRESS NOTES
"Critical Care Progress Note    Date of admission  4/16/2021    Chief Complaint  50 y.o. female the past medical history of pseudotumor cerebri status post  shunt by Dr. Oh, chronic pain with history of placement of nerve stimulator, vasculitis, right anterior chest port for home IV meds with recurrent infection who presented 4/11/2021 with to [facility] with recurrent port infection and was initially treated with vancomycin and Zosyn and then changed to ceftaroline and subsequently switched to nafcillin when MSSA was identified.    Hospital Course  \"50 y.o. female the past medical history of pseudotumor cerebri status post  shunt by Dr. Oh, chronic pain with history of placement of nerve stimulator, vasculitis, right anterior chest port for home IV meds with recurrent infection who presented 4/11/2021 with to [facility] with recurrent port infection. Found to have MSSA bacteremia, endocarditis, septic pulmonary emboli/empyema/pneumatocele, and CSF pleocytosis concerning for  shunt involvement.  Seen by Dr. Oh, NSGY, at [facility] who did not recommend  shunt removal.  Seen by Dr. Vargas here for concern of fluid collection around her spinal stimulator and he recommended against removal. Remains intubated, encephalopathic.    4/16 admitted to ICU  4/17 VD 2, 2 FFP, pRBC overnight; new chest tube per gen surg  4/18 VD 3, TPa/Dornase with 1400 cc from R chest tube    4/26: R chest tube not functioning, d/c it.  broke his leg and going to OR today, so not available currently.  VD 11.  RSBI immediately high on SBT attempt. Followed commands for RN      Interval Problem Update  Neuro: sedated, following commands off sedation.  Back on sedation for respiratory status  HR: ST   SBP:   Tmax: 101  GI: tube feeds at goal, BM 25th  I/O: no output R chest tube, L chest tube 30  Lines: TLC, PIV  Mobility: level 1  Resp: VD 11, 18/320/8/30%. No significant secretions.  Desats when sedation was dropped   Vte: " lovenox  PPI/H2:pepcid  Antibx: cefepime    Pull R chest tube, nonfunctioning  L chest tube 30mL out  Check mag, phos  K scale    Review of Systems  Review of Systems   Unable to perform ROS: Intubated        Vital Signs for last 24 hours   Temp:  [37.5 °C (99.5 °F)-38 °C (100.4 °F)] 37.7 °C (99.9 °F)  Pulse:  [] 121  Resp:  [19-32] 23  BP: ()/(46-84) 103/46  SpO2:  [96 %-100 %] 96 %    Hemodynamic parameters for last 24 hours       Respiratory Information for the last 24 hours  Vent Mode: APVCMV  Rate (breaths/min): 18  Vt Target (mL): 320  PEEP/CPAP: 8  MAP: 16  Control VTE (exp VT): 325    Physical Exam   Physical Exam  Constitutional:       Appearance: She is ill-appearing.      Interventions: She is sedated and intubated.   HENT:      Mouth/Throat:      Mouth: Mucous membranes are moist.   Eyes:      Pupils: Pupils are equal, round, and reactive to light.   Cardiovascular:      Rate and Rhythm: Regular rhythm. Tachycardia present.      Heart sounds: Murmur present.   Pulmonary:      Effort: She is intubated.   Abdominal:      General: Bowel sounds are normal.      Tenderness: There is no abdominal tenderness. There is no guarding.   Musculoskeletal:      Right lower leg: Edema present.      Left lower leg: Edema present.      Comments: Generalized mild anasarca   Skin:     General: Skin is warm and dry.   Neurological:      Comments: Deeply sedated   Psychiatric:      Comments: Unable to assess         Medications  Current Facility-Administered Medications   Medication Dose Route Frequency Provider Last Rate Last Admin   • K+ Scale: Goal of 4.5  1 Each Intravenous Q6HRS Benoit Loya Jr., D.O.   1 Each at 04/26/21 0600   • enoxaparin (LOVENOX) inj 40 mg  40 mg Subcutaneous DAILY Benoit Loya Jr., D.O.   40 mg at 04/26/21 0519   • ceFEPIme (MAXIPIME) 1 g in  mL IVPB  1 g Intravenous Q8HRS LEYLA Hinton.OFrederick   Stopped at 04/26/21 0549   • propofol (DIPRIVAN) injection  0-80 mcg/kg/min  Intravenous Continuous Viraj Burgos M.D. 14.8 mL/hr at 04/26/21 0909 30 mcg/kg/min at 04/26/21 0909   • furosemide (LASIX) injection 40 mg  40 mg Intravenous Q DAY Viraj Burgos M.D.   40 mg at 04/26/21 0518   • fentaNYL (SUBLIMAZE) 50 mcg/mL in 50mL   Intravenous Continuous Viraj Burgos M.D. 2 mL/hr at 04/26/21 0515 100 mcg/hr at 04/26/21 0515   • Pharmacy Consult: Enteral tube insertion - review meds/change route/product selection  1 Each Other PHARMACY TO DOSE Elliott Salvador M.D.       • acetaminophen (Tylenol) tablet 650 mg  650 mg Enteral Tube Q4HRS PRN Elliott Salvador M.D.   650 mg at 04/25/21 2345   • magnesium hydroxide (MILK OF MAGNESIA) suspension 30 mL  30 mL Enteral Tube QDAY PRN Elliott Salvador M.D.        And   • senna-docusate (PERICOLACE or SENOKOT S) 8.6-50 MG per tablet 2 tablet  2 tablet Enteral Tube BID Elliott Salvador M.D.   Stopped at 04/23/21 1800    And   • polyethylene glycol/lytes (MIRALAX) PACKET 1 Packet  1 Packet Enteral Tube QDAY PRN Elliott Salvador M.D.        And   • bisacodyl (DULCOLAX) suppository 10 mg  10 mg Rectal QDAY PRN Elliott Salvador M.D.       • divalproex (DEPAKOTE SPRINKLE) capsule 500 mg  500 mg Enteral Tube Q8HRS Elliott Salvador M.D.   500 mg at 04/26/21 0519   • DULoxetine (CYMBALTA) capsule 60 mg  60 mg Enteral Tube BID Elliott Salvador M.D.   60 mg at 04/26/21 0518   • PARoxetine (PAXIL) tablet 10 mg  10 mg Enteral Tube QAM Elliott Salvador M.D.   10 mg at 04/26/21 0518   • risperiDONE (RISPERDAL) tablet 2 mg  2 mg Enteral Tube BID Elliott Salvador M.D.   2 mg at 04/26/21 0518   • Respiratory Therapy Consult   Nebulization Continuous RT Man Wahl M.D.       • famotidine (PEPCID) tablet 20 mg  20 mg Enteral Tube Q12HRS Man Wahl M.D.   20 mg at 04/26/21 0518    Or   • famotidine (PEPCID) injection 20 mg  20 mg Intravenous Q12HRS Man Wahl M.D.   20 mg at 04/25/21 0449   • MD Alert...ICU Electrolyte Replacement per  Pharmacy   Other PHARMACY TO DOSE Man Wahl M.D.       • lidocaine (XYLOCAINE) 1 % injection 1-2 mL  1-2 mL Tracheal Tube Q30 MIN PRN Man Wahl M.D.       • ipratropium-albuterol (DUONEB) nebulizer solution  3 mL Nebulization Q2HRS PRN (RT) Man Wahl M.D.       • fentaNYL (SUBLIMAZE) injection 25 mcg  25 mcg Intravenous Q HOUR PRN Man Wahl M.D.        Or   • fentaNYL (SUBLIMAZE) injection 50 mcg  50 mcg Intravenous Q HOUR PRN Man Wahl M.D.        Or   • fentaNYL (SUBLIMAZE) injection 100 mcg  100 mcg Intravenous Q HOUR PRN Man Wahl M.D.   100 mcg at 04/25/21 0448   • norepinephrine (Levophed) infusion 8 mg/250 mL (premix)  0-30 mcg/min Intravenous Continuous Man Wahl M.D.   Stopped at 04/23/21 1141       Fluids    Intake/Output Summary (Last 24 hours) at 4/26/2021 0939  Last data filed at 4/26/2021 0825  Gross per 24 hour   Intake 1910.14 ml   Output 2002 ml   Net -91.86 ml       Laboratory  Recent Labs     04/26/21  0419   ISTATAPH 7.501*   ISTATAPCO2 31.3   ISTATAPO2 54*   ISTATATCO2 25   KPAQDLL9AWZ 91*   ISTATARTHCO3 24.5   ISTATARTBE 1   ISTATTEMP 37.8 C   ISTATFIO2 30   ISTATSPEC Arterial   ISTATAPHTC 7.489   [id number] 57*         Recent Labs     04/24/21  0410 04/24/21  1309 04/25/21  0500 04/25/21  1250 04/25/21  1812 04/25/21  2345 04/26/21  0515   SODIUM 128*  --  129*  --   --   --  130*   POTASSIUM 2.9*   < > 3.7   < > 3.7 3.6 3.8   CHLORIDE 94*  --  97  --   --   --  100   CO2 26  --  25  --   --   --  25   BUN 18  --  17  --   --   --  12   CREATININE 0.40*  --  0.34*  --   --   --  0.26*   CALCIUM 8.0*  --  7.8*  --   --   --  8.0*    < > = values in this interval not displayed.     Recent Labs     04/24/21  0410 04/25/21  0500 04/25/21  1812 04/26/21  0515   ALTSGPT  --   --   --  13   ASTSGOT  --   --   --  27   ALKPHOSPHAT  --   --   --  323*   TBILIRUBIN  --   --   --  1.2   GAMMAGT  --   --  222*  --    GLUCOSE 111* 89  --  109*      Recent Labs     04/24/21  0410 04/25/21  0500 04/26/21  0515   WBC 16.6* 13.3* 13.5*   NEUTSPOLYS 86.00* 79.70* 82.10*   LYMPHOCYTES 5.20* 7.50* 6.70*   MONOCYTES 7.70 10.80 9.00   EOSINOPHILS 0.00 0.00 0.00   BASOPHILS 0.20 0.50 0.50   ASTSGOT  --   --  27   ALTSGPT  --   --  13   ALKPHOSPHAT  --   --  323*   TBILIRUBIN  --   --  1.2     Recent Labs     04/24/21  0410 04/24/21  1715 04/25/21  0500 04/25/21  1755 04/25/21  1812 04/26/21  0515   RBC 2.55*  --  2.18*  --   --  2.42*   HEMOGLOBIN 7.8*   < > 6.6* 7.8*  --  7.4*   HEMATOCRIT 23.8*  --  20.2*  --   --  22.7*   PLATELETCT 505*  --  494*  --   --  502*   PROTHROMBTM  --   --   --   --  16.0*  --    INR  --   --   --   --  1.24*  --     < > = values in this interval not displayed.       Imaging  X-Ray:  I have personally reviewed the images and compared with prior images.  CT:    Reviewed  Echo:   Reviewed    Assessment/Plan  * Acute bacterial endocarditis- (present on admission)  Assessment & Plan  MSSA  Cultures cleared 4/17  Infected port removed at [facility]   shunt and spinal stimulator could be seeded, NSGY has consulted on both and don't recommend removal  MV and TV vegetations with septic pulmonary emboli  Extended cefepime  ID consulting  No evidence based surgical indications (valvular CHF, difficult pathogen, etc) so no cardiovascular surgery consult is necessary at this time      Pleural effusion, left  Assessment & Plan  Loculated fluid  Probably parapneumonic fluid from abscesses  CT guided chest tube placed 4/23, fluid sent  Lymph predominant, NGTD  TPA/dornase one dose on 4/24.  Hold d/t dropping Hgb  CT chest 4/25 with resolution of fluid    Bacteremia due to methicillin susceptible Staphylococcus aureus (MSSA)- (present on admission)  Assessment & Plan  Source presumed right anterior chest line/port with endocarditis  See discussion of endocarditis above        Empyema of right pleural space (HCC)- (present on admission)  Assessment &  Plan  R empyema due to septic pulmonary emboli  Chest tube placement 4/16  At [facility], Upsized 4/18 Dr Benedict, stopped functioning so d/c'ed 4/26  Received TPA/dornase from 4/18-20.  Stopped b/c Hgb dropped requiring transfusion  Hydropneumothorax, very complex pleural space with likely significant trapped lung  Needs decortication, currently non-operative candidate due to her high surgical morbidity/mortality.  Consider re-evaluation for surgery in a few weeks if clinically improving.  Her extensive pneumatoceles will preclude her from spontaneous respiration with adequate ventilation.       Acute respiratory failure with hypoxia (HCC)- (present on admission)  Assessment & Plan  Intubated 4/15 for respiratory failure at [facility]  Due to hydro/pneumo, trapped lung, lung abscesses  Lung protective ventilation strategies  Appropriate vent bundles  Does not tolerate SBTs, her RSBI get extremely high immediately        Acute encephalopathy- (present on admission)  Assessment & Plan  Multifactorial including septic and metabolic  Status post lumbar puncture with cytosis, negative Gram stain and cultures NGTD   shunt for pseudotumor cerebri  Not candidate for MRI d/t spinal stimulator  Ct repeat no intracranial abnormalities on 4/21  EEG with encephalopathy and no seizures  Minimize sedation as able  Her problem list includes autoimmune cerebritis.  Need to clarify the history of this, as I don't see anything in her prior d/c summaries about an autoimmune cerebritis- not available d/t broken leg today    Fever  Assessment & Plan  Worsening fever curse 4/25  BAL from 4/24 with sensitive klebsiella  Blood cultures and UA are unrevealing    Pneumonia  Assessment & Plan  MSSA septic emboli with empyema   BAL 4/20 klebsiella pneum  BAL 4/24 still heavy growth of klebsiella  On cefepime    CSF pleocytosis- (present on admission)  Assessment & Plan  Status post lumbar puncture 4/13  WBC 53, Glucose 47, protein 97 Gram stain  negative and culture negative so far  Neurosurgery aware,  shunt in place  MRI brain requested by neurosurgery at [facility], not candidate    Repeat ct no embolic lesions  Abx changed to cefepime for CNS penetration    Acute blood loss anemia- (present on admission)  Assessment & Plan  Hgb dropped again after TPA/dornase on 4/25  No clear source of bleed  D/c TPA/dornase  Hgb stable  Transfuse >7    S/P  shunt- (present on admission)  Assessment & Plan  History of pseudotumor cerebri  History of  shunt by Dr. Oh  Could be seeded by MSSA, Dr. Oh consulted at [facility], recommended MRI but unable given her spinal stimulator    Thrombocytosis (HCC)  Assessment & Plan  Probably reactive    Anasarca  Assessment & Plan  Mild diffuse peripheral edema  Probably due to critical illness, hypoalbuminemia, prior volume resuscitation  Gentle diuresis    Trapped lung  Assessment & Plan  hydropneumo with rind/trapped lung  Needs decort eventually, not currently an operative candidate    Pulmonary abscess (HCC)  Assessment & Plan  Septic emboli from TV endocarditis    Septic shock (HCC)- (present on admission)  Assessment & Plan  Shock resolved    Hypokalemia  Assessment & Plan  K-scale    History of vasculitis- (present on admission)  Assessment & Plan  Monitor    Prolonged Q-T interval on ECG- (present on admission)  Assessment & Plan  Avoid QT prolonging agents as able  Optimize electrolytes  Cardiac monitoring  EKG    History of complicated migraines- (present on admission)  Assessment & Plan  Home regimen    Hyponatremia  Assessment & Plan  Mild, monitor    Abnormal LFTs  Assessment & Plan  Chronic alk phos elevation  GGT high c/w liver source  Outpatient workup    H/O: CVA (cerebrovascular accident)- (present on admission)  Assessment & Plan  Monitor    Chronic pain syndrome- (present on admission)  Assessment & Plan  Chronic back pain and intractable headaches  History of nerve stimulator   Dr Vargas consulted, no plan to  remove stimulator due to her clinical instability     Very complex case of a 50-year-old female with an extensive history of chronic pain due to migraines and back pain with a nerve stimulator and outpatient infusions of Toradol and Benadryl.  She has previously been diagnosed with pseudotumor cerebri and has a  shunt in place.  She had a port for her outpatient infusions which became infected and resulted in associated endocarditis with pulmonary abscesses and empyema.  She may also have seeded her  shunt, as an LP showed CSF pleocytosis.  She remains with severe encephalopathy and respiratory failure with an exceedingly complex pleural space bilaterally.  Unfortunately, due to how critically ill she is, she is a poor surgical candidate for surgeries such as empyema decortication, pain stimulator removal, or  shunt removal.  She does not tolerate SAT/SBT's due to severe tachypnea and tachycardia.  My impression at this point is that her only chance of survival would be an extended course of antibiotics while receiving mechanical ventilatory support, and if she has clinical improvement the above described surgeries could be considered.  Her risk of mortality from this illness is extremely high.    VTE:  lovenox  Ulcer: H2 Antagonist  Lines: Central Line  Ongoing indication addressed and Lopez Catheter  Ongoing indication addressed    I have performed a physical exam and reviewed and updated ROS and Plan today (4/26/2021). In review of yesterday's note (4/25/2021), there are no changes except as documented above.       Discussed patient condition and risk of morbidity and/or mortality with RN, RT, Pharmacy, Dietary, Code status disscussed and Charge nurse / hot rounds.      The patient remains critically ill requiring mechanical ventilation, ongoing management of very complex pleural space bilaterally, dropping hemoglobin requiring blood transfusions.  Critical care time = 50 minutes in directly providing and  coordinating critical care and extensive data review.  No time overlap and excludes procedures

## 2021-04-26 NOTE — DISCHARGE PLANNING
Care Transition Team Discharge Planning    Anticipated Discharge Disposition: TBD    Action: Per AM rounds, pt's spouse and 6 kids are involved. Pt's spouse is on T3 now, and will come up to visit pt when he is able. Pt is on vent day 11. MD indicates will most likely need LTAC d/c at this point.      Barriers to Discharge: medical clearance/stability    Plan: Lsw will continue to follow, attend rounds for medical updates, and assist w/ d/c planning.

## 2021-04-26 NOTE — PROGRESS NOTES
Infectious Disease Daily Progress Note    Date of Service  4/25/2021    Chief Complaint  Cefepime 4/23 to present.  PRIOR Rx:   Zosyn 4/22-4/23  Previous: Unasyn, Zosyn, Vanco, Daptomycin  Ceftaroline: start 4/13-4/15  Nafcillin 2g Q4 hrs 4/15-4/23 4/14: Unable to give name of previous NSG or neurologist. Seems confused, but able to say some sentences fluently.   4/15: Line cultures now growing MSSA. As well as from the blood. Repeat blood culture 4/13+ in both sets. Patient has worsening confusion. CSF fluid analyses suggest pleocytosis. Cultures are no growth from the CSF. Chest CT was ordered this morning indicating a loculated right pleural effusion as well as bilateral septic emboli. Dr. Mack spoke with Dr. Oh yesterday and no surgical intervention unless clinical deterioration.  4/16: Increased respiratory distress.  Tachypneic.  CT with loculated collection.  Dr Resendiz not available.  No thoracic surgeon available.  Plans to have pulmonary place CT.  Transferring to ICU.  4/17: Transferred to [facility] last night. Hgb dropped to 6.4 requiring PRBC transfusion. Requiring 2 pressors. Fio2 50%. Febrile 38.8. Pending OR decortication of empyema and hemothorax. Chest tube placed at [facility] shows 8,371 WBC, ,000, 74% N  4/18: Chest tube was upsized by surgery yesterday, no decortication. WBC 22k. Fio2 40%. Vasopressin. Levophed down to 2mcg. Afebrile 24 hrs. Last fever 38.6 on 4/16.   4/19: Still on vent. Levo 3 mcg, vasopressin. Afebrile 72 hours. WBC 21k. BC 4/17 NGTD x 48 hours. Unable to do MRI brain given neurostimulator per RN.   4/20: Remaining afebrile. Hb 6.2 and undergoing transfusion today.WBC 24,100, 5% bands.1700 ml CT output. Copious bloody ET secretions. No pressors. Receiving dornase and TPA per CT. Right pleural effusion not large per CXR today.   4/21: WBC 31k. Bronchoscopy yesterday showing blood. Cultures sent. TPA on hold per RN due to arvind blood from chest tube requiring PRBC  transfusion for hgb 6. Pending chest CT today. Per , spinal stimulator is non-MRI compatible. Levophed 10mcg. 30% Fio2. Afebrile.   4/22: Fever 101.3 this am. WBC 20,300 down from 32,200 yesterday. BAL growing Klebsiella pneumoniae but susceptibility panel not set up until today. CTA of brain shows no lesion. CT of chest shows enlarging cavitary lung lesions bilat and large loculated new left pleural effusion and large hydropneumothorax on right. TPA & dornase infusions of right chest ended 4/20 after considerable bleeding requiring transfusion. Hb now down again to 6.8.  4/23: WBC 23k. Fio2 40%. Tmax 38.1. US of stiumlator shows small fluid collection but no drainable abscess. Pending MICAH today. Pending L chest tube placement  4/24: Continue fever 100.8-101.8. WBC 16,600. MICAH shows large vegetations on both tricuspid valve and mitral valve with mild TR & MR.  No aortic root abscess. Left chest tube placed yesterday yielding 8 ml of old bloody fluid. Bronch today revealed copious thick maroon secretions in distal trachea and both mainstem bronchi. On the right these were obstructive. Remaining off pressors. Last positive blood cultures (MSSA) were 4/16, negative 4/17.  4/25: Fever 100.6 this AM. wBC 13,300. Hb 6.6. CT: right hydropneumothorax increasing, right bronchopleural fistula, mediastinal shift ot the left , multiple cavitary pulmonary nodules, 3.1 cm left basilar mass, splenomegaly. CT of head shows dural thickening over the clivus. Lac+ GNR >100,000 col/ml growing from BAL of 4/24, presumed Klebsiella. Left peural fluid culture negative from 4/23.  Review of Systems  Review of Systems   Unable to perform ROS: Intubated      Physical Exam  Temp:  [37.5 °C (99.5 °F)-38.1 °C (100.6 °F)] 37.5 °C (99.5 °F)  Pulse:  [] 123  Resp:  [19-38] 30  BP: ()/(41-98) 137/67  SpO2:  [78 %-100 %] 99 % On FIO2 35%, PEEP 5.     Physical Exam  Constitutional:       Comments: Intubated sedated . Exam  unchanged  HENT:      Head: Normocephalic. Gaze conjugate. No chemosis or conjunctival injection.  Neck supple.     Comments: ET tube. Oral mucosae moist.   Cardiovascular:      Rate and Rhythm: Normal rate and regular rhythm.      Heart sounds: No murmur.   Pulmonary: O2 sat 100% on FIO2 30%, PEEP 8.      Comments: Diminished R side with rales. R chest tube with scant blood. Left chest tube.  Abdominal:      General: Abdomen is flat. Bowel sounds are normal. There is no distension or apparent tenderness.   Musculoskeletal:         General: 1+ pedal edema.   Skin:     Comments: Bilat chest tubes. Stimulator palpable, non indurated. No rash.    Laboratory  Recent Labs     04/23/21  0518 04/23/21  1655 04/24/21  0410 04/24/21  1715 04/25/21  0500   WBC 21.4*  --  16.6*  --  13.3*   RBC 2.32*  --  2.55*  --  2.18*   HEMOGLOBIN 7.1*   < > 7.8* 7.3* 6.6*   HEMATOCRIT 21.0*  --  23.8*  --  20.2*   MCV 90.5  --  93.3  --  92.7   MCH 30.6  --  30.6  --  30.3   MCHC 33.8  --  32.8*  --  32.7*   RDW 53.3*  --  57.1*  --  57.9*   PLATELETCT 507*  --  505*  --  494*   MPV 8.8*  --  9.0  --  8.8*    < > = values in this interval not displayed.     Recent Labs     04/23/21  0518 04/23/21  0518 04/24/21  0410 04/24/21  1309 04/24/21  2350 04/25/21  0500 04/25/21  1250   SODIUM 134*  --  128*  --   --  129*  --    POTASSIUM 3.3*   < > 2.9*   < > 3.4* 3.7 3.8   CHLORIDE 100  --  94*  --   --  97  --    CO2 27  --  26  --   --  25  --    GLUCOSE 85  --  111*  --   --  89  --    BUN 17  --  18  --   --  17  --    CREATININE 0.47*  --  0.40*  --   --  0.34*  --    CALCIUM 7.7*  --  8.0*  --   --  7.8*  --     < > = values in this interval not displayed.           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       - Acute left empyema s/p chest tube placement 4/23  -Multiple acute septic pulmonary emboli bilaterally  -Acute bilateral pneumonia due to above    -Pseudotumor cerebri with underlying  shunt: possible arachnoiditis  -Chronic migraine headaches  -History of autoimmune vasculitis  -Elevated alkaline phosphatase & bilirubin  -Acute encephalopathy due to sepsis      - anemia due to above      - New secondary Klebsiella pneumoniae pneumonia (presumed VAP)     Plan   Patient has evidence of disseminated staphylococcal sepsis source from her line with infection to her lungs, tricuspid & mitral valves and  bilat empyema requiring transfer to [facility] for decortication due to lack of CT surgery support at [facility].  However not a surgical candidate after all. Now with bilateral cavitary lung abscess and empyema. Sputum cultures now growing additional Kleb. May be a candidate for decortication in the future if she stabilizes, but thoracic surgery service has signed off for now.  Persistent fever likely attributable to empyema and multiple lung abscesses.   Consider MRI of brain when clinically feasible. Spinal stimulator may prevent MRI.   - Pt needs CNS coverage for initial MSSA infection, so now on Cefepime 1gm Q8hrs    - US of spinal stimulator shows ill defined fluid collection, but undrainable. NSGY does not recommend stiumulator removal due to instability  - central line exchanged 4/22  - initial CSF cultures has pleocytosis, but no culture growth  - chest tube upsized by surgery 4/17.   - blood culture from 4/14 at [facility] positive. Admission blood cultures here on 4/16 both positive.  4/17 blood cultures NGTD  - Central line now has been changed 4/22  - CSF findings on 4/13:  63 wbc, 82% PMN, protein 97.  - original infected port removed 4/12  - vent management per pulmonology      Condition remains critically ill      45 min spent with 50% face to face care and critical  care time involvement  Thank you for this consult. Case discussed with RN

## 2021-04-26 NOTE — ASSESSMENT & PLAN NOTE
Mild diffuse peripheral edema  Probably due to critical illness, hypoalbuminemia, prior volume resuscitation  Gentle diuresis

## 2021-04-26 NOTE — ASSESSMENT & PLAN NOTE
Decortication 4/28  Continue chest tubes to suction and hope lungs expand  R chest has had expanding somewhat.    There is now visible respiratory movement in the R chest and some improved expansion on CXR  CT on 5/10 with continued empyema

## 2021-04-27 ENCOUNTER — APPOINTMENT (OUTPATIENT)
Dept: RADIOLOGY | Facility: MEDICAL CENTER | Age: 51
DRG: 003 | End: 2021-04-27
Attending: INTERNAL MEDICINE
Payer: MEDICARE

## 2021-04-27 ENCOUNTER — ANCILLARY PROCEDURE (OUTPATIENT)
Dept: CARDIAC CATH/INVASIVE PROCEDURES | Facility: MEDICAL CENTER | Age: 51
DRG: 003 | End: 2021-04-27
Attending: INTERNAL MEDICINE
Payer: MEDICARE

## 2021-04-27 PROBLEM — Z71.89 GOALS OF CARE, COUNSELING/DISCUSSION: Status: ACTIVE | Noted: 2021-04-27

## 2021-04-27 PROBLEM — Z96.89 SPINAL CORD STIMULATOR STATUS: Status: ACTIVE | Noted: 2021-04-27

## 2021-04-27 LAB
ANION GAP SERPL CALC-SCNC: 7 MMOL/L (ref 7–16)
BASE EXCESS BLDA CALC-SCNC: 2 MMOL/L (ref -4–3)
BASE EXCESS BLDA CALC-SCNC: 2 MMOL/L (ref -4–3)
BASOPHILS # BLD AUTO: 0.6 % (ref 0–1.8)
BASOPHILS # BLD: 0.09 K/UL (ref 0–0.12)
BODY TEMPERATURE: ABNORMAL DEGREES
BODY TEMPERATURE: ABNORMAL DEGREES
BREATHS SETTING VENT: 16
BREATHS SETTING VENT: 18
BUN SERPL-MCNC: 14 MG/DL (ref 8–22)
CALCIUM SERPL-MCNC: 8.1 MG/DL (ref 8.5–10.5)
CHLORIDE SERPL-SCNC: 98 MMOL/L (ref 96–112)
CO2 BLDA-SCNC: 26 MMOL/L (ref 20–33)
CO2 BLDA-SCNC: 26 MMOL/L (ref 20–33)
CO2 SERPL-SCNC: 24 MMOL/L (ref 20–33)
CREAT SERPL-MCNC: 0.27 MG/DL (ref 0.5–1.4)
DELSYS IDSYS: ABNORMAL
DELSYS IDSYS: ABNORMAL
END TIDAL CARBON DIOXIDE IECO2: 29 MMHG
EOSINOPHIL # BLD AUTO: 0 K/UL (ref 0–0.51)
EOSINOPHIL NFR BLD: 0 % (ref 0–6.9)
ERYTHROCYTE [DISTWIDTH] IN BLOOD BY AUTOMATED COUNT: 61.4 FL (ref 35.9–50)
GLUCOSE SERPL-MCNC: 101 MG/DL (ref 65–99)
HAPTOGLOB SERPL-MCNC: 301 MG/DL (ref 30–200)
HCO3 BLDA-SCNC: 24.8 MMOL/L (ref 17–25)
HCO3 BLDA-SCNC: 25.2 MMOL/L (ref 17–25)
HCT VFR BLD AUTO: 22.6 % (ref 37–47)
HGB BLD-MCNC: 7.3 G/DL (ref 12–16)
HIV 1+2 AB+HIV1 P24 AG SERPL QL IA: NORMAL
HOROWITZ INDEX BLDA+IHG-RTO: 140 MM[HG]
HOROWITZ INDEX BLDA+IHG-RTO: 193 MM[HG]
IMM GRANULOCYTES # BLD AUTO: 0.45 K/UL (ref 0–0.11)
IMM GRANULOCYTES NFR BLD AUTO: 3 % (ref 0–0.9)
LYMPHOCYTES # BLD AUTO: 1.1 K/UL (ref 1–4.8)
LYMPHOCYTES NFR BLD: 7.2 % (ref 22–41)
MAGNESIUM SERPL-MCNC: 1.8 MG/DL (ref 1.5–2.5)
MCH RBC QN AUTO: 30.3 PG (ref 27–33)
MCHC RBC AUTO-ENTMCNC: 32.3 G/DL (ref 33.6–35)
MCV RBC AUTO: 93.8 FL (ref 81.4–97.8)
MODE IMODE: ABNORMAL
MODE IMODE: ABNORMAL
MONOCYTES # BLD AUTO: 1.56 K/UL (ref 0–0.85)
MONOCYTES NFR BLD AUTO: 10.2 % (ref 0–13.4)
NEUTROPHILS # BLD AUTO: 12.02 K/UL (ref 2–7.15)
NEUTROPHILS NFR BLD: 79 % (ref 44–72)
NRBC # BLD AUTO: 0 K/UL
NRBC BLD-RTO: 0 /100 WBC
O2/TOTAL GAS SETTING VFR VENT: 30 %
O2/TOTAL GAS SETTING VFR VENT: 30 %
PCO2 BLDA: 28.8 MMHG (ref 26–37)
PCO2 BLDA: 33.2 MMHG (ref 26–37)
PCO2 TEMP ADJ BLDA: 30.3 MMHG (ref 26–37)
PCO2 TEMP ADJ BLDA: 34.6 MMHG (ref 26–37)
PEEP END EXPIRATORY PRESSURE IPEEP: 8 CMH20
PEEP END EXPIRATORY PRESSURE IPEEP: 8 CMH20
PH BLDA: 7.49 [PH] (ref 7.4–7.5)
PH BLDA: 7.54 [PH] (ref 7.4–7.5)
PH TEMP ADJ BLDA: 7.47 [PH] (ref 7.4–7.5)
PH TEMP ADJ BLDA: 7.52 [PH] (ref 7.4–7.5)
PLATELET # BLD AUTO: 519 K/UL (ref 164–446)
PMV BLD AUTO: 9 FL (ref 9–12.9)
PO2 BLDA: 42 MMHG (ref 64–87)
PO2 BLDA: 58 MMHG (ref 64–87)
PO2 TEMP ADJ BLDA: 45 MMHG (ref 64–87)
PO2 TEMP ADJ BLDA: 63 MMHG (ref 64–87)
POTASSIUM SERPL-SCNC: 3.2 MMOL/L (ref 3.6–5.5)
POTASSIUM SERPL-SCNC: 3.4 MMOL/L (ref 3.6–5.5)
POTASSIUM SERPL-SCNC: 3.6 MMOL/L (ref 3.6–5.5)
POTASSIUM SERPL-SCNC: 3.7 MMOL/L (ref 3.6–5.5)
POTASSIUM SERPL-SCNC: 3.8 MMOL/L (ref 3.6–5.5)
POTASSIUM SERPL-SCNC: 4.4 MMOL/L (ref 3.6–5.5)
RBC # BLD AUTO: 2.41 M/UL (ref 4.2–5.4)
SAO2 % BLDA: 82 % (ref 93–99)
SAO2 % BLDA: 93 % (ref 93–99)
SARS-COV+SARS-COV-2 AG RESP QL IA.RAPID: NOTDETECTED
SODIUM SERPL-SCNC: 129 MMOL/L (ref 135–145)
SPECIMEN DRAWN FROM PATIENT: ABNORMAL
SPECIMEN DRAWN FROM PATIENT: ABNORMAL
SPECIMEN SOURCE: NORMAL
TIDAL VOLUME IVT: 320 ML
TIDAL VOLUME IVT: 320 ML
TRIGL SERPL-MCNC: 166 MG/DL (ref 0–149)
WBC # BLD AUTO: 15.2 K/UL (ref 4.8–10.8)

## 2021-04-27 PROCEDURE — U0003 INFECTIOUS AGENT DETECTION BY NUCLEIC ACID (DNA OR RNA); SEVERE ACUTE RESPIRATORY SYNDROME CORONAVIRUS 2 (SARS-COV-2) (CORONAVIRUS DISEASE [COVID-19]), AMPLIFIED PROBE TECHNIQUE, MAKING USE OF HIGH THROUGHPUT TECHNOLOGIES AS DESCRIBED BY CMS-2020-01-R: HCPCS

## 2021-04-27 PROCEDURE — 700111 HCHG RX REV CODE 636 W/ 250 OVERRIDE (IP): Performed by: INTERNAL MEDICINE

## 2021-04-27 PROCEDURE — 87426 SARSCOV CORONAVIRUS AG IA: CPT

## 2021-04-27 PROCEDURE — 770022 HCHG ROOM/CARE - ICU (200)

## 2021-04-27 PROCEDURE — 94799 UNLISTED PULMONARY SVC/PX: CPT

## 2021-04-27 PROCEDURE — 700102 HCHG RX REV CODE 250 W/ 637 OVERRIDE(OP): Performed by: INTERNAL MEDICINE

## 2021-04-27 PROCEDURE — 36600 WITHDRAWAL OF ARTERIAL BLOOD: CPT

## 2021-04-27 PROCEDURE — 700117 HCHG RX CONTRAST REV CODE 255: Performed by: INTERNAL MEDICINE

## 2021-04-27 PROCEDURE — 84132 ASSAY OF SERUM POTASSIUM: CPT

## 2021-04-27 PROCEDURE — 80048 BASIC METABOLIC PNL TOTAL CA: CPT

## 2021-04-27 PROCEDURE — A9270 NON-COVERED ITEM OR SERVICE: HCPCS | Performed by: INTERNAL MEDICINE

## 2021-04-27 PROCEDURE — 71275 CT ANGIOGRAPHY CHEST: CPT | Mod: ME

## 2021-04-27 PROCEDURE — 99291 CRITICAL CARE FIRST HOUR: CPT | Performed by: INTERNAL MEDICINE

## 2021-04-27 PROCEDURE — 99292 CRITICAL CARE ADDL 30 MIN: CPT | Performed by: INTERNAL MEDICINE

## 2021-04-27 PROCEDURE — 94003 VENT MGMT INPAT SUBQ DAY: CPT

## 2021-04-27 PROCEDURE — 71045 X-RAY EXAM CHEST 1 VIEW: CPT

## 2021-04-27 PROCEDURE — 83735 ASSAY OF MAGNESIUM: CPT

## 2021-04-27 PROCEDURE — U0005 INFEC AGEN DETEC AMPLI PROBE: HCPCS

## 2021-04-27 PROCEDURE — 700105 HCHG RX REV CODE 258: Performed by: INTERNAL MEDICINE

## 2021-04-27 PROCEDURE — 93971 EXTREMITY STUDY: CPT

## 2021-04-27 PROCEDURE — 93971 EXTREMITY STUDY: CPT | Mod: 26 | Performed by: INTERNAL MEDICINE

## 2021-04-27 PROCEDURE — 93970 EXTREMITY STUDY: CPT

## 2021-04-27 PROCEDURE — 82803 BLOOD GASES ANY COMBINATION: CPT

## 2021-04-27 PROCEDURE — 85025 COMPLETE CBC W/AUTO DIFF WBC: CPT

## 2021-04-27 RX ORDER — POTASSIUM CHLORIDE 14.9 MG/ML
20 INJECTION INTRAVENOUS ONCE
Status: COMPLETED | OUTPATIENT
Start: 2021-04-27 | End: 2021-04-27

## 2021-04-27 RX ORDER — POTASSIUM CHLORIDE 7.45 MG/ML
10 INJECTION INTRAVENOUS ONCE
Status: COMPLETED | OUTPATIENT
Start: 2021-04-27 | End: 2021-04-27

## 2021-04-27 RX ORDER — MAGNESIUM SULFATE HEPTAHYDRATE 40 MG/ML
2 INJECTION, SOLUTION INTRAVENOUS ONCE
Status: COMPLETED | OUTPATIENT
Start: 2021-04-27 | End: 2021-04-27

## 2021-04-27 RX ORDER — SODIUM CHLORIDE, SODIUM LACTATE, POTASSIUM CHLORIDE, AND CALCIUM CHLORIDE .6; .31; .03; .02 G/100ML; G/100ML; G/100ML; G/100ML
1000 INJECTION, SOLUTION INTRAVENOUS ONCE
Status: COMPLETED | OUTPATIENT
Start: 2021-04-27 | End: 2021-04-27

## 2021-04-27 RX ORDER — POTASSIUM CHLORIDE 7.45 MG/ML
10 INJECTION INTRAVENOUS ONCE
Status: COMPLETED | OUTPATIENT
Start: 2021-04-28 | End: 2021-04-28

## 2021-04-27 RX ADMIN — PROPOFOL 40 MCG/KG/MIN: 10 INJECTION, EMULSION INTRAVENOUS at 03:10

## 2021-04-27 RX ADMIN — POTASSIUM CHLORIDE 20 MEQ: 14.9 INJECTION, SOLUTION INTRAVENOUS at 06:27

## 2021-04-27 RX ADMIN — DIVALPROEX SODIUM 500 MG: 125 CAPSULE ORAL at 21:58

## 2021-04-27 RX ADMIN — POTASSIUM CHLORIDE 20 MEQ: 14.9 INJECTION, SOLUTION INTRAVENOUS at 00:45

## 2021-04-27 RX ADMIN — RISPERIDONE 2 MG: 1 TABLET, FILM COATED ORAL at 05:05

## 2021-04-27 RX ADMIN — POTASSIUM CHLORIDE 10 MEQ: 10 INJECTION, SOLUTION INTRAVENOUS at 16:23

## 2021-04-27 RX ADMIN — FUROSEMIDE 40 MG: 10 INJECTION, SOLUTION INTRAMUSCULAR; INTRAVENOUS at 05:05

## 2021-04-27 RX ADMIN — POTASSIUM CHLORIDE 20 MEQ: 14.9 INJECTION, SOLUTION INTRAVENOUS at 14:24

## 2021-04-27 RX ADMIN — ACETAMINOPHEN 650 MG: 325 TABLET, FILM COATED ORAL at 02:03

## 2021-04-27 RX ADMIN — CEFEPIME 1 G: 1 INJECTION, POWDER, FOR SOLUTION INTRAMUSCULAR; INTRAVENOUS at 22:07

## 2021-04-27 RX ADMIN — IOHEXOL 50 ML: 350 INJECTION, SOLUTION INTRAVENOUS at 12:17

## 2021-04-27 RX ADMIN — ENOXAPARIN SODIUM 40 MG: 40 INJECTION SUBCUTANEOUS at 05:05

## 2021-04-27 RX ADMIN — DULOXETINE HYDROCHLORIDE 60 MG: 60 CAPSULE, DELAYED RELEASE ORAL at 17:48

## 2021-04-27 RX ADMIN — FAMOTIDINE 20 MG: 20 TABLET ORAL at 17:49

## 2021-04-27 RX ADMIN — PROPOFOL 60 MCG/KG/MIN: 10 INJECTION, EMULSION INTRAVENOUS at 17:49

## 2021-04-27 RX ADMIN — MAGNESIUM SULFATE 2 G: 2 INJECTION INTRAVENOUS at 10:43

## 2021-04-27 RX ADMIN — PROPOFOL 60 MCG/KG/MIN: 10 INJECTION, EMULSION INTRAVENOUS at 13:23

## 2021-04-27 RX ADMIN — DIVALPROEX SODIUM 500 MG: 125 CAPSULE ORAL at 05:05

## 2021-04-27 RX ADMIN — RISPERIDONE 2 MG: 1 TABLET, FILM COATED ORAL at 17:48

## 2021-04-27 RX ADMIN — PROPOFOL 60 MCG/KG/MIN: 10 INJECTION, EMULSION INTRAVENOUS at 23:08

## 2021-04-27 RX ADMIN — PROPOFOL 60 MCG/KG/MIN: 10 INJECTION, EMULSION INTRAVENOUS at 19:37

## 2021-04-27 RX ADMIN — ACETAMINOPHEN 650 MG: 325 TABLET, FILM COATED ORAL at 10:43

## 2021-04-27 RX ADMIN — FENTANYL CITRATE 100 MCG: 50 INJECTION, SOLUTION INTRAMUSCULAR; INTRAVENOUS at 21:58

## 2021-04-27 RX ADMIN — DIVALPROEX SODIUM 500 MG: 125 CAPSULE ORAL at 13:23

## 2021-04-27 RX ADMIN — SODIUM CHLORIDE, POTASSIUM CHLORIDE, SODIUM LACTATE AND CALCIUM CHLORIDE 1000 ML: 600; 310; 30; 20 INJECTION, SOLUTION INTRAVENOUS at 06:24

## 2021-04-27 RX ADMIN — CEFEPIME 1 G: 1 INJECTION, POWDER, FOR SOLUTION INTRAMUSCULAR; INTRAVENOUS at 05:06

## 2021-04-27 RX ADMIN — CEFEPIME 1 G: 1 INJECTION, POWDER, FOR SOLUTION INTRAMUSCULAR; INTRAVENOUS at 13:24

## 2021-04-27 RX ADMIN — ACETAMINOPHEN 650 MG: 325 TABLET, FILM COATED ORAL at 20:24

## 2021-04-27 RX ADMIN — PAROXETINE HYDROCHLORIDE 10 MG: 20 TABLET, FILM COATED ORAL at 05:05

## 2021-04-27 RX ADMIN — DULOXETINE HYDROCHLORIDE 60 MG: 60 CAPSULE, DELAYED RELEASE ORAL at 05:05

## 2021-04-27 RX ADMIN — FAMOTIDINE 20 MG: 20 TABLET ORAL at 05:05

## 2021-04-27 RX ADMIN — PROPOFOL 40 MCG/KG/MIN: 10 INJECTION, EMULSION INTRAVENOUS at 08:47

## 2021-04-27 RX ADMIN — Medication 200 MCG/HR: at 11:11

## 2021-04-27 ASSESSMENT — PAIN DESCRIPTION - PAIN TYPE
TYPE: ACUTE PAIN

## 2021-04-27 ASSESSMENT — FIBROSIS 4 INDEX: FIB4 SCORE: 0.69

## 2021-04-27 NOTE — PROGRESS NOTES
Infectious Disease Progress Note    Author: Brian Omer M.D. Date & Time created: 4/27/2021  2:11 PM  PRIOR Rx:   Zosyn 4/22-4/23  Previous: Unasyn, Zosyn, Vanco, Daptomycin  Ceftaroline: start 4/13-4/15  Nafcillin 2g Q4 hrs 4/15-4/23 4/14: Unable to give name of previous NSG or neurologist. Seems confused, but able to say some sentences fluently.   4/15: Line cultures now growing MSSA. As well as from the blood. Repeat blood culture 4/13+ in both sets. Patient has worsening confusion. CSF fluid analyses suggest pleocytosis. Cultures are no growth from the CSF. Chest CT was ordered this morning indicating a loculated right pleural effusion as well as bilateral septic emboli. Dr. Mack spoke with Dr. Oh yesterday and no surgical intervention unless clinical deterioration.  4/16: Increased respiratory distress.  Tachypneic.  CT with loculated collection.  Dr Resendiz not available.  No thoracic surgeon available.  Plans to have pulmonary place CT.  Transferring to ICU.  4/17: Transferred to [facility] last night. Hgb dropped to 6.4 requiring PRBC transfusion. Requiring 2 pressors. Fio2 50%. Febrile 38.8. Pending OR decortication of empyema and hemothorax. Chest tube placed at [facility] shows 8,371 WBC, ,000, 74% N  4/18: Chest tube was upsized by surgery yesterday, no decortication. WBC 22k. Fio2 40%. Vasopressin. Levophed down to 2mcg. Afebrile 24 hrs. Last fever 38.6 on 4/16.   4/19: Still on vent. Levo 3 mcg, vasopressin. Afebrile 72 hours. WBC 21k. BC 4/17 NGTD x 48 hours. Unable to do MRI brain given neurostimulator per RN.   4/20: Remaining afebrile. Hb 6.2 and undergoing transfusion today.WBC 24,100, 5% bands.1700 ml CT output. Copious bloody ET secretions. No pressors. Receiving dornase and TPA per CT. Right pleural effusion not large per CXR today.   4/21: WBC 31k. Bronchoscopy yesterday showing blood. Cultures sent. TPA on hold per RN due to arvind blood from chest tube requiring PRBC transfusion  for hgb 6. Pending chest CT today. Per , spinal stimulator is non-MRI compatible. Levophed 10mcg. 30% Fio2. Afebrile.   4/22: Fever 101.3 this am. WBC 20,300 down from 32,200 yesterday. BAL growing Klebsiella pneumoniae but susceptibility panel not set up until today. CTA of brain shows no lesion. CT of chest shows enlarging cavitary lung lesions bilat and large loculated new left pleural effusion and large hydropneumothorax on right. TPA & dornase infusions of right chest ended 4/20 after considerable bleeding requiring transfusion. Hb now down again to 6.8.  4/23: WBC 23k. Fio2 40%. Tmax 38.1. US of stiumlator shows small fluid collection but no drainable abscess. Pending MICAH today. Pending L chest tube placement  4/24: Continue fever 100.8-101.8. WBC 16,600. MICAH shows large vegetations on both tricuspid valve and mitral valve with mild TR & MR.  No aortic root abscess. Left chest tube placed yesterday yielding 8 ml of old bloody fluid. Bronch today revealed copious thick maroon secretions in distal trachea and both mainstem bronchi. On the right these were obstructive. Remaining off pressors. Last positive blood cultures (MSSA) were 4/16, negative 4/17.  4/25: Fever 100.6 this AM. wBC 13,300. Hb 6.6. CT: right hydropneumothorax increasing, right bronchopleural fistula, mediastinal shift ot the left , multiple cavitary pulmonary nodules, 3.1 cm left basilar mass, splenomegaly. CT of head shows dural thickening over the clivus. Lac+ GNR >100,000 col/ml growing from BAL of 4/24, presumed Klebsiella. Left peural fluid culture negative from 4/23.  4/26: Fever 100.4 last night. WBC 13,500. Hb 7.4. Plts 502,000. ESR >120. UA fairly clear except 30 mg% protein, 2-5 wbc and rare rbc. CXR unchanged except more prominent pulmonary edema. GNR in BAL may be a different species of Klebsiella, and resistant to ampicillin & tmp-sulfa; confirmation pending.  4/27: Please see discussion below for details today.    Interval  History:  Past 24 hrs reviewed with RN.    Labs Reviewed, Medications Reviewed, Radiology Reviewed, Wound Reviewed, Fluids Reviewed and GI Nutrition Reviewed    Review of Systems:2  Review of Systems   Unable to perform ROS: Intubated       Physical Exam:  Physical Exam  Vitals and nursing note reviewed.   Constitutional:       General: She is not in acute distress.     Appearance: She is ill-appearing. She is not toxic-appearing.   HENT:      Head: Normocephalic and atraumatic.   Cardiovascular:      Rate and Rhythm: Regular rhythm. Tachycardia present.      Heart sounds: No murmur.   Pulmonary:      Effort: Pulmonary effort is normal. No respiratory distress.      Breath sounds: Normal breath sounds. No wheezing or rales.      Comments: Full vent support. Right CT out and left still in place with minimal drainage.  Abdominal:      General: Abdomen is flat. There is no distension.      Palpations: Abdomen is soft.      Tenderness: There is no abdominal tenderness.   Skin:     General: Skin is warm and dry.   Neurological:      Comments: Sedated   Psychiatric:      Comments: Sedated         Labs:  Recent Results (from the past 24 hour(s))   Infectious Disease Testing (Exposure)    Collection Time: 04/26/21  2:30 PM   Result Value Ref Range    Hepatitis B Surface Antigen Non-Reactive Non-Reactive    Hepatitis C Antibody Non-Reactive Non-Reactive   HIV Rapid Screen (Exposure)    Collection Time: 04/26/21  2:30 PM    Specimen: Blood   Result Value Ref Range    HIV Ag/Ab Combo Assay Non-Reactive Non Reactive   CRP Quantitive (Non-Cardiac)    Collection Time: 04/26/21  3:00 PM   Result Value Ref Range    Stat C-Reactive Protein 14.06 (H) 0.00 - 0.75 mg/dL   Prealbumin    Collection Time: 04/26/21  3:00 PM   Result Value Ref Range    Pre-Albumin 11.7 (L) 18.0 - 38.0 mg/dL   POTASSIUM SERUM (K)    Collection Time: 04/26/21  3:00 PM   Result Value Ref Range    Potassium 3.2 (L) 3.6 - 5.5 mmol/L   HGB    Collection Time:  04/26/21  3:00 PM   Result Value Ref Range    Hemoglobin 7.1 (L) 12.0 - 16.0 g/dL   POTASSIUM SERUM (K)    Collection Time: 04/26/21  6:30 PM   Result Value Ref Range    Potassium 3.2 (L) 3.6 - 5.5 mmol/L   Triglycerides Starting now and then Every 3 Days    Collection Time: 04/26/21 11:55 PM   Result Value Ref Range    Triglycerides 166 (H) 0 - 149 mg/dL   HIV AG/AB COMBO ASSAY DIAGNOSTIC    Collection Time: 04/26/21 11:55 PM   Result Value Ref Range    HIV Ag/Ab Combo Assay Non-Reactive Non Reactive   POTASSIUM SERUM (K)    Collection Time: 04/26/21 11:55 PM   Result Value Ref Range    Potassium 3.4 (L) 3.6 - 5.5 mmol/L   CBC with Differential    Collection Time: 04/27/21  2:10 AM   Result Value Ref Range    WBC 15.2 (H) 4.8 - 10.8 K/uL    RBC 2.41 (L) 4.20 - 5.40 M/uL    Hemoglobin 7.3 (L) 12.0 - 16.0 g/dL    Hematocrit 22.6 (L) 37.0 - 47.0 %    MCV 93.8 81.4 - 97.8 fL    MCH 30.3 27.0 - 33.0 pg    MCHC 32.3 (L) 33.6 - 35.0 g/dL    RDW 61.4 (H) 35.9 - 50.0 fL    Platelet Count 519 (H) 164 - 446 K/uL    MPV 9.0 9.0 - 12.9 fL    Neutrophils-Polys 79.00 (H) 44.00 - 72.00 %    Lymphocytes 7.20 (L) 22.00 - 41.00 %    Monocytes 10.20 0.00 - 13.40 %    Eosinophils 0.00 0.00 - 6.90 %    Basophils 0.60 0.00 - 1.80 %    Immature Granulocytes 3.00 (H) 0.00 - 0.90 %    Nucleated RBC 0.00 /100 WBC    Neutrophils (Absolute) 12.02 (H) 2.00 - 7.15 K/uL    Lymphs (Absolute) 1.10 1.00 - 4.80 K/uL    Monos (Absolute) 1.56 (H) 0.00 - 0.85 K/uL    Eos (Absolute) 0.00 0.00 - 0.51 K/uL    Baso (Absolute) 0.09 0.00 - 0.12 K/uL    Immature Granulocytes (abs) 0.45 (H) 0.00 - 0.11 K/uL    NRBC (Absolute) 0.00 K/uL   Basic Metabolic Panel (BMP)    Collection Time: 04/27/21  2:10 AM   Result Value Ref Range    Sodium 129 (L) 135 - 145 mmol/L    Potassium 3.7 3.6 - 5.5 mmol/L    Chloride 98 96 - 112 mmol/L    Co2 24 20 - 33 mmol/L    Glucose 101 (H) 65 - 99 mg/dL    Bun 14 8 - 22 mg/dL    Creatinine 0.27 (L) 0.50 - 1.40 mg/dL    Calcium 8.1  (L) 8.5 - 10.5 mg/dL    Anion Gap 7.0 7.0 - 16.0   Magnesium    Collection Time: 21  2:10 AM   Result Value Ref Range    Magnesium 1.8 1.5 - 2.5 mg/dL   ESTIMATED GFR    Collection Time: 21  2:10 AM   Result Value Ref Range    GFR If African American >60 >60 mL/min/1.73 m 2    GFR If Non African American >60 >60 mL/min/1.73 m 2   POCT arterial blood gas device results    Collection Time: 21  4:26 AM   Result Value Ref Range    Ph 7.542 (H) 7.400 - 7.500    Pco2 28.8 26.0 - 37.0 mmHg    Po2 58 (L) 64 - 87 mmHg    Tco2 26 20 - 33 mmol/L    S02 93 93 - 99 %    Hco3 24.8 17.0 - 25.0 mmol/L    BE 2 -4 - 3 mmol/L    Body Temp 38.2 C degrees    O2 Therapy 30 %    iPF Ratio 193     Ph Temp Ben 7.524 (H) 7.400 - 7.500    Pco2 Temp Co 30.3 26.0 - 37.0 mmHg    Po2 Temp Cor 63 (L) 64 - 87 mmHg    Specimen Arterial     DelSys Vent     End Tidal Carbon Dioxide 29 mmhg    Tidal Volume 320 mL    Peep End Expiratory Pressure 8 cmh20    Set Rate 18     Mode APV-CMV    POTASSIUM SERUM (K)    Collection Time: 21  5:00 AM   Result Value Ref Range    Potassium 3.6 3.6 - 5.5 mmol/L   POTASSIUM SERUM (K)    Collection Time: 21 12:36 PM   Result Value Ref Range    Potassium 3.2 (L) 3.6 - 5.5 mmol/L       Hemodynamics:  Temp (24hrs), Av.1 °C (100.6 °F), Min:37.9 °C (100.2 °F), Max:38.3 °C (100.9 °F)  Temperature: 37.9 °C (100.2 °F), Monitored Temp: 38 °C (100.4 °F)  Pulse  Av.9  Min: 40  Max: 137   Blood Pressure: 122/64    Peripheral IV 22 G Left Upper arm (Active)   Site Assessment Clean;Dry;Intact 21   Dressing Type Transparent;Occlusive 04/27/21 0800   Line Status Scrubbed the hub prior to access;Flushed;Saline locked 21 08   Dressing Status Clean;Dry;Intact 21 08   Dressing Intervention N/A 21   Dressing Change Due 21 0800   Date Primary Tubing Changed 21 0800   Date Secondary Tubing Changed 21   NEXT Primary  Tubing Change  04/24/21 04/23/21 2000   NEXT Secondary Tubing Change  04/23/21 04/22/21 2000   Infiltration Grading ([facility]) 0 04/27/21 0800   Phlebitis Scale ([facility] Only) 0 04/27/21 0800       CVC Triple Lumen 04/21/21 Left Subclavian (Active)   Site Assessment Clean;Dry;Intact 04/27/21 0800   Lumen 1 Status Blood return noted;Flushed;Infusing;Scrubbed the hub prior to access 04/27/21 0800   Lumen 3 Status Blood return noted;Flushed;Infusing;Scrubbed the hub prior to access 04/27/21 0800   Lumen 2 Status Blood return noted;Flushed;Scrubbed the hub prior to access 04/27/21 0800   Dressing Type Biopatch;Occlusive;Transparent 04/27/21 0800   Dressing Status Clean;Dry;Intact 04/27/21 0800   Dressing Intervention N/A 04/26/21 2000   Dressing Change Due 05/01/21 04/27/21 0800   Date Primary Tubing Changed 04/25/21 04/25/21 2200   Date Secondary Tubing Changed 04/25/21 04/25/21 2200   Date IV Connector(s) Changed 04/25/21 04/25/21 2200   NEXT Primary Tubing Change  04/27/21 04/25/21 2200   NEXT Secondary Tubing Change  04/27/21 04/25/21 2200   NEXT IV Connector(s) Change 04/27/21 04/25/21 2200   Line Necessity Assessed High Doses of Potassium for Prolonged Periods 04/26/21 2000     Wound:  @WOUNDLDA(4)@     Fluids:  Intake/Output       04/25/21 0700 - 04/26/21 0659 04/26/21 0700 - 04/27/21 0659 04/27/21 0700 - 04/28/21 0659      [id number] 1900-0659 Total 0700-1859 1900-0659 Total 0700-1859 1900-0659 Total       Intake    I.V.  491.6  194.3 685.9  200  287.6 487.6  203.4  -- 203.4    Magnesium Sulfate Volume -- -- -- -- 50 50 50 -- 50    Propofol Volume 491.6 194.3 685.9 200 237.6 437.6 103.4 -- 103.4    IV Volume (Fentanyl) -- -- -- -- -- -- 50 -- 50    Blood  282.5  -- 282.5  --  -- --  --  -- --    Volume (RELEASE RED BLOOD CELLS) 282.5 -- 282.5 -- -- -- -- -- --    Other  --  60 60  --  -- --  --  -- --    Medications (PO/Enteral Liquids) -- 60 60 -- -- -- -- -- --    NG/GT  440  240 680  480  400 880  240  --  240    Intake (mL) (Enteral Tube 04/21/21 Dobhoff - Gastric Right nare) 440 240 680 480 400 880 240 -- 240    IV Piggyback  100  300 400  200  400 600  1100  -- 1100    Volume (mL) (lactated ringer BOLUS infusion) -- -- -- -- -- -- 1000 -- 1000    Volume (mL) (potassium chloride in water (KCL) ivpb **Administer in ICU only** 20 mEq) 0 -- 0 -- -- -- -- -- --    Volume (mL) (potassium chloride in water (KCL) ivpb **Administer in ICU only** 20 mEq) -- 100 100 -- -- -- -- -- --    Volume (mL) (potassium chloride (KCL) ivpb 10 mEq) 100 -- 100 -- -- -- -- -- --    Volume (mL) (potassium chloride in water (KCL) ivpb **Administer in ICU only** 20 mEq) -- 100 100 -- -- -- -- -- --    Volume (mL) (potassium chloride in water (KCL) ivpb **Administer in ICU only** 20 mEq) -- 100 100 -- -- -- -- -- --    Volume (mL) (potassium chloride (KCL) ivpb 10 mEq) -- -- -- 100 -- 100 -- -- --    Volume (mL) (potassium chloride in water (KCL) ivpb **Administer in ICU only** 20 mEq) -- -- -- 100 -- 100 -- -- --    Volume (mL) (potassium chloride (KCL) ivpb 10 mEq) -- -- -- -- 100 100 -- -- --    Volume (mL) (potassium chloride in water (KCL) ivpb **Administer in ICU only** 20 mEq) -- -- -- -- 100 100 -- -- --    Volume (mL) (potassium chloride (KCL) ivpb 10 mEq) -- -- -- -- 100 100 -- -- --    Volume (mL) (potassium chloride in water (KCL) ivpb **Administer in ICU only** 20 mEq) -- -- -- -- 100 100 -- -- --    Volume (mL) (potassium chloride in water (KCL) ivpb **Administer in ICU only** 20 mEq) -- -- -- -- -- -- 100 -- 100    Enteral  90  90 180  60  90 150  60  -- 60    Free Water / Tube Flush 90 90 180 60 90 150 60 -- 60    Total Intake 1404.1 884.3 2288.4 940 1177.6 2117.6 1603.4 -- 1603.4       Output    Urine  950  1400 2350  2200  1550 3750  1200  -- 1200    Output (mL) (Urethral Catheter) 950 1400 2350 2200 1550 3750 1200 -- 1200    Drains  --  -- --  --  0 0  --  -- --    Residual Amount (mL) (Discarded) (Enteral Tube 04/21/21  Dobhoff - Gastric Right nare) -- -- -- -- 0 0 -- -- --    Stool  --  0 0  --  -- --  --  -- --    Number of Times Stooled -- 1 x 1 x -- 1 x 1 x -- -- --    Measurable Stool (mL) -- 0 0 -- -- -- -- -- --    Chest Tube  --  2 2  35  0 35  0  -- 0    Output (mL) ([REMOVED] Chest Tube 1 Right Fifth intercostal space) -- 2 2 5 -- 5 -- -- --    Output (mL) (Chest Tube 2 Left Other (Comment)) -- 0 0 30 0 30 0 -- 0    Total Output 950 1402 2352 2235 1550 3785 1200 -- 1200       Net I/O     454.1 -517.7 -63.6 -1295 -372.4 -1667.4 403.4 -- 403.4        Weight: 83.2 kg (183 lb 6.8 oz)  GI/Nutrition:  Orders Placed This Encounter   Procedures   • Diet NPO     Standing Status:   Standing     Number of Occurrences:   15     Order Specific Question:   Restrict to:     Answer:   Strict [1]     Comments:   medications ok     Medications:  Current Facility-Administered Medications   Medication Last Admin   • potassium chloride in water (KCL) ivpb **Administer in ICU only** 20 mEq      Followed by   • potassium chloride (KCL) ivpb 10 mEq     • K+ Scale: Goal of 4.5 1 Each at 04/27/21 1200   • enoxaparin (LOVENOX) inj 40 mg 40 mg at 04/27/21 0505   • ceFEPIme (MAXIPIME) 1 g in  mL IVPB Stopped at 04/27/21 1354   • propofol (DIPRIVAN) injection 60 mcg/kg/min at 04/27/21 1323   • fentaNYL (SUBLIMAZE) 50 mcg/mL in 50mL 200 mcg/hr at 04/27/21 1111   • Pharmacy Consult: Enteral tube insertion - review meds/change route/product selection     • acetaminophen (Tylenol) tablet 650 mg 650 mg at 04/27/21 1043   • magnesium hydroxide (MILK OF MAGNESIA) suspension 30 mL      And   • senna-docusate (PERICOLACE or SENOKOT S) 8.6-50 MG per tablet 2 tablet Stopped at 04/23/21 1800    And   • polyethylene glycol/lytes (MIRALAX) PACKET 1 Packet      And   • bisacodyl (DULCOLAX) suppository 10 mg     • divalproex (DEPAKOTE SPRINKLE) capsule 500 mg 500 mg at 04/27/21 1323   • DULoxetine (CYMBALTA) capsule 60 mg 60 mg at 04/27/21 0505   • PARoxetine  (PAXIL) tablet 10 mg 10 mg at 04/27/21 0505   • risperiDONE (RISPERDAL) tablet 2 mg 2 mg at 04/27/21 0505   • Respiratory Therapy Consult     • famotidine (PEPCID) tablet 20 mg 20 mg at 04/27/21 0505    Or   • famotidine (PEPCID) injection 20 mg 20 mg at 04/25/21 0449   • MD Alert...ICU Electrolyte Replacement per Pharmacy     • lidocaine (XYLOCAINE) 1 % injection 1-2 mL     • ipratropium-albuterol (DUONEB) nebulizer solution     • fentaNYL (SUBLIMAZE) injection 25 mcg      Or   • fentaNYL (SUBLIMAZE) injection 50 mcg      Or   • fentaNYL (SUBLIMAZE) injection 100 mcg 100 mcg at 04/26/21 1708     Medical Decision Making, by Problem:  Active Hospital Problems    Diagnosis    • *Acute bacterial endocarditis [I33.0]    • Acute respiratory failure with hypoxia (HCC) [J96.01]    • Empyema of right pleural space (HCC) [J86.9]    • Bacteremia due to methicillin susceptible Staphylococcus aureus (MSSA) [R78.81, B95.61]    • Acute encephalopathy [G93.40]    • Fever [R50.9]    • Pleural effusion, left [J90]    • Atelectasis [J98.11]    • Acute blood loss anemia [D62]    • CSF pleocytosis [D72.9]    • Pneumonia [J18.9]    • Pseudotumor cerebri [G93.2]    • S/P  shunt [Z98.2]    • Prolonged Q-T interval on ECG [R94.31]    • History of vasculitis [Z86.79]    • History of complicated migraines [G43.109]    • Hyponatremia [E87.1]    • Tachycardia [R00.0]    • Abnormal LFTs [R94.5]    • H/O: CVA (cerebrovascular accident) [Z86.73]    • Chronic pain syndrome [G89.4]    • Thrombocytopenia (HCC) [D69.6]    • Spinal cord stimulator status [Z96.89]    • Goals of care, counseling/discussion [Z71.89]    • Abnormal movement [G25.9]    • Pulmonary abscess (HCC) [J85.2]    • 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       - Acute left empyema s/p chest tube placement 4/23  -Multiple acute septic pulmonary emboli bilaterally  -Acute bilateral pneumonia due to above     --Pulmonary edema  -Pseudotumor cerebri with underlying  shunt: possible arachnoiditis  -Chronic migraine headaches  -History of autoimmune vasculitis  -Elevated alkaline phosphatase & bilirubin  -Acute encephalopathy due to sepsis      - Anemia due to above      - New secondary Klebsiella pneumoniae pneumonia (presumed VAP)     Plan   Patient has evidence of disseminated staphylococcal sepsis source from her line with infection to her lungs, tricuspid & mitral valves and  bilat empyema requiring transfer to [facility] for decortication due to lack of CT surgery support at [facility].  However not a surgical candidate after all. Now with bilateral cavitary lung abscess and empyema. Sputum cultures now growing additional Kleb. May be a candidate for decortication in the future if she stabilizes, but thoracic surgery service has signed off for now.  Persistent fever likely attributable to empyema and multiple lung abscesses.   Consider MRI of brain when clinically feasible. Spinal stimulator may prevent MRI.   - Pt needs CNS coverage for initial MSSA infection, so now on Cefepime 1gm Q8hrs  - Cefepime covers new Klebsiella sp in BAL so no change today.   - US of spinal stimulator shows ill defined fluid collection, but undrainable. NSGY does not recommend stiumulator removal due to instability  - Central line exchanged 4/22  - Initial CSF cultures has pleocytosis, but no culture growth  - Chest tube upsized by surgery 4/17.   - Blood culture from 4/14 at [facility] positive. Admission blood cultures here on 4/16 both positive.  4/17 blood cultures  NGTD  - Central line now has been changed 4/22  - CSF findings on 4/13:  63 wbc, 82% PMN, protein 97.  - Original infected port removed 4/12  - Vent management per pulmonology          She still has a significant hydropneumothorax on the right and fever. She really needssurgical intervention for her chest but she is not a surgical candidate per Dr. Vasquez in her condition. Hopefully she can gain enough improvement over time to become a surgical candidate. Case and treatment reviewed with micro and RN 35+ min in room on floor in CICU with face to face view 90% of the time and 100% in direct patient care today.

## 2021-04-27 NOTE — RESPIRATORY CARE
Ventilator Daily Summary     Vent Day # 12 ETT 7.5 @ 21  APVCMV:  16/320/8/40%     Plan: Continue current ventilator settings and wean mechanical ventilation as tolerated per physician orders.

## 2021-04-27 NOTE — PROGRESS NOTES
"Critical Care Progress Note    Date of admission  4/16/2021    Chief Complaint  50 y.o. female the past medical history of pseudotumor cerebri status post  shunt by Dr. Oh, chronic pain with history of placement of nerve stimulator, vasculitis, right anterior chest port for home IV meds with recurrent infection who presented 4/11/2021 with to [facility] with recurrent port infection and was initially treated with vancomycin and Zosyn and then changed to ceftaroline and subsequently switched to nafcillin when MSSA was identified.    Hospital Course  \"50 y.o. female the past medical history of pseudotumor cerebri status post  shunt by Dr. Oh, chronic pain with history of placement of nerve stimulator, vasculitis, right anterior chest port for home IV meds with recurrent infection who presented 4/11/2021 with to [facility] with recurrent port infection. Found to have MSSA bacteremia, endocarditis, septic pulmonary emboli/empyema/pneumatocele, and CSF pleocytosis concerning for  shunt involvement.  Seen by Dr. Oh, NSGY, at [facility] who did not recommend  shunt removal.  Seen by Dr. Vargas here for concern of fluid collection around her spinal stimulator and he recommended against removal. Remains intubated, encephalopathic.    4/16 admitted to ICU  4/17 VD 2, 2 FFP, pRBC overnight; new chest tube per gen surg  4/18 VD 3, TPa/Dornase with 1400 cc from R chest tube    4/26: R chest tube not functioning, d/c it.  broke his leg and going to OR today, so not available currently.  VD 11.  RSBI immediately high on SBT attempt. Followed commands for RN      Interval Problem Update  Neuro: following, sedation more for respiratory  HR: 110-130s  SBP:   Tmax: 38.5  GI: TF at goal, BM last night  I/O: nevarez 1500 out  Lines: subclavian, PIV  Mobility: EOB  Resp: VD 12, 16/320/8/30%.  Too tachy with SAT   Vte: lovenox  PPI/H2:pepcid  Antibx: cefepime d12    Increased tachycardia  EOB last night  3+ edema " uppers  k-scale  Mag replaced      Review of Systems  Review of Systems   Unable to perform ROS: Intubated        Vital Signs for last 24 hours   Temp:  [37.9 °C (100.2 °F)-38.3 °C (100.9 °F)] 37.9 °C (100.2 °F)  Pulse:  [110-127] 122  Resp:  [19-33] 23  BP: ()/(46-70) 89/46  SpO2:  [89 %-98 %] 97 %    Hemodynamic parameters for last 24 hours       Respiratory Information for the last 24 hours  Vent Mode: APVCMV  Rate (breaths/min): 16  Vt Target (mL): 320  PEEP/CPAP: 8  MAP: 15  Control VTE (exp VT): 322    Physical Exam   Physical Exam  Constitutional:       Appearance: She is ill-appearing.      Interventions: She is sedated and intubated.   HENT:      Mouth/Throat:      Mouth: Mucous membranes are moist.   Eyes:      Pupils: Pupils are equal, round, and reactive to light.   Cardiovascular:      Rate and Rhythm: Regular rhythm. Tachycardia present.      Heart sounds: Murmur present.   Pulmonary:      Effort: She is intubated.   Abdominal:      General: Bowel sounds are normal.      Tenderness: There is no abdominal tenderness. There is no guarding.   Musculoskeletal:      Right lower leg: Edema present.      Left lower leg: Edema present.      Comments: Generalized mild anasarca   Skin:     General: Skin is warm and dry.   Neurological:      Comments: Deeply sedated   Psychiatric:      Comments: Unable to assess         Medications  Current Facility-Administered Medications   Medication Dose Route Frequency Provider Last Rate Last Admin   • potassium chloride in water (KCL) ivpb **Administer in ICU only** 20 mEq  20 mEq Intravenous Once Leti Sun M.D. 50 mL/hr at 04/27/21 0627 20 mEq at 04/27/21 0627   • K+ Scale: Goal of 4.5  1 Each Intravenous Q6HRS Benoit Loya Jr., D.O.   1 Each at 04/27/21 0600   • enoxaparin (LOVENOX) inj 40 mg  40 mg Subcutaneous DAILY Benoit Loya Jr., D.O.   40 mg at 04/27/21 0505   • ceFEPIme (MAXIPIME) 1 g in  mL IVPB  1 g Intravenous Q8HRS Marlon Cox D.O.    Stopped at 04/27/21 0536   • propofol (DIPRIVAN) injection  0-80 mcg/kg/min Intravenous Continuous Viraj Burgos M.D. 14.8 mL/hr at 04/27/21 0657 30 mcg/kg/min at 04/27/21 0657   • fentaNYL (SUBLIMAZE) 50 mcg/mL in 50mL   Intravenous Continuous Viraj Burgos M.D. 3 mL/hr at 04/27/21 0651 150 mcg/hr at 04/27/21 0651   • Pharmacy Consult: Enteral tube insertion - review meds/change route/product selection  1 Each Other PHARMACY TO DOSE Elliott Salvador M.D.       • acetaminophen (Tylenol) tablet 650 mg  650 mg Enteral Tube Q4HRS PRN Elliott Salvador M.D.   650 mg at 04/27/21 0203   • magnesium hydroxide (MILK OF MAGNESIA) suspension 30 mL  30 mL Enteral Tube QDAY PRN Elliott Salvador M.D.        And   • senna-docusate (PERICOLACE or SENOKOT S) 8.6-50 MG per tablet 2 tablet  2 tablet Enteral Tube BID Elliott Salvador M.D.   Stopped at 04/23/21 1800    And   • polyethylene glycol/lytes (MIRALAX) PACKET 1 Packet  1 Packet Enteral Tube QDAY PRN Elliott Salvador M.D.        And   • bisacodyl (DULCOLAX) suppository 10 mg  10 mg Rectal QDAY PRN Elliott Salvador M.D.       • divalproex (DEPAKOTE SPRINKLE) capsule 500 mg  500 mg Enteral Tube Q8HRS Elliott Salvador M.D.   500 mg at 04/27/21 0505   • DULoxetine (CYMBALTA) capsule 60 mg  60 mg Enteral Tube BID Elliott Salvador M.D.   60 mg at 04/27/21 0505   • PARoxetine (PAXIL) tablet 10 mg  10 mg Enteral Tube QAM Elilott Salvador M.D.   10 mg at 04/27/21 0505   • risperiDONE (RISPERDAL) tablet 2 mg  2 mg Enteral Tube BID Elliott Salvador M.D.   2 mg at 04/27/21 0505   • Respiratory Therapy Consult   Nebulization Continuous RT Man Wahl M.D.       • famotidine (PEPCID) tablet 20 mg  20 mg Enteral Tube Q12HRS Man Wahl M.D.   20 mg at 04/27/21 0505    Or   • famotidine (PEPCID) injection 20 mg  20 mg Intravenous Q12HRS Man Wahl M.D.   20 mg at 04/25/21 0449   • MD Alert...ICU Electrolyte Replacement per Pharmacy   Other PHARMACY TO DOSE  Man Wahl M.D.       • lidocaine (XYLOCAINE) 1 % injection 1-2 mL  1-2 mL Tracheal Tube Q30 MIN PRN Man Wahl M.D.       • ipratropium-albuterol (DUONEB) nebulizer solution  3 mL Nebulization Q2HRS PRN (RT) Man Wahl M.D.       • fentaNYL (SUBLIMAZE) injection 25 mcg  25 mcg Intravenous Q HOUR PRN Man Wahl M.D.        Or   • fentaNYL (SUBLIMAZE) injection 50 mcg  50 mcg Intravenous Q HOUR PRN Man Wahl M.D.        Or   • fentaNYL (SUBLIMAZE) injection 100 mcg  100 mcg Intravenous Q HOUR PRN Man Wahl M.D.   100 mcg at 04/26/21 1708   • norepinephrine (Levophed) infusion 8 mg/250 mL (premix)  0-30 mcg/min Intravenous Continuous Man Wahl M.D.   Stopped at 04/23/21 1141       Fluids    Intake/Output Summary (Last 24 hours) at 4/27/2021 0709  Last data filed at 4/27/2021 0600  Gross per 24 hour   Intake 2117.58 ml   Output 3785 ml   Net -1667.42 ml       Laboratory  Recent Labs     04/26/21  0419 04/27/21  0426   ISTATAPH 7.501* 7.542*   ISTATAPCO2 31.3 28.8   ISTATAPO2 54* 58*   ISTATATCO2 25 26   TCMFFSP7PEY 91* 93   ISTATARTHCO3 24.5 24.8   ISTATARTBE 1 2   ISTATTEMP 37.8 C 38.2 C   ISTATFIO2 30 30   ISTATSPEC Arterial Arterial   ISTATAPHTC 7.489 7.524*   YBKWIAKY8YL 57* 63*         Recent Labs     04/25/21  0500 04/25/21  1250 04/26/21  0515 04/26/21  1500 04/26/21  2355 04/27/21  0210 04/27/21  0500   SODIUM 129*  --  130*  --   --  129*  --    POTASSIUM 3.7   < > 3.8   < > 3.4* 3.7 3.6   CHLORIDE 97  --  100  --   --  98  --    CO2 25  --  25  --   --  24  --    BUN 17  --  12  --   --  14  --    CREATININE 0.34*  --  0.26*  --   --  0.27*  --    MAGNESIUM  --   --  1.7  --   --  1.8  --    PHOSPHORUS  --   --  2.5  --   --   --   --    CALCIUM 7.8*  --  8.0*  --   --  8.1*  --     < > = values in this interval not displayed.     Recent Labs     04/25/21  0500 04/25/21  1812 04/26/21  0515 04/26/21  1500 04/27/21  0210   ALTSGPT  --   --  13  --   --     ASTSGOT  --   --  27  --   --    ALKPHOSPHAT  --   --  323*  --   --    TBILIRUBIN  --   --  1.2  --   --    GAMMAGT  --  222*  --   --   --    PREALBUMIN  --   --   --  11.7*  --    GLUCOSE 89  --  109*  --  101*     Recent Labs     04/25/21  0500 04/26/21  0515 04/27/21  0210   WBC 13.3* 13.5* 15.2*   NEUTSPOLYS 79.70* 82.10* 79.00*   LYMPHOCYTES 7.50* 6.70* 7.20*   MONOCYTES 10.80 9.00 10.20   EOSINOPHILS 0.00 0.00 0.00   BASOPHILS 0.50 0.50 0.60   ASTSGOT  --  27  --    ALTSGPT  --  13  --    ALKPHOSPHAT  --  323*  --    TBILIRUBIN  --  1.2  --      Recent Labs     04/25/21  0500 04/25/21  1755 04/25/21  1812 04/26/21  0515 04/26/21  1500 04/27/21  0210   RBC 2.18*  --   --  2.42*  --  2.41*   HEMOGLOBIN 6.6*   < >  --  7.4* 7.1* 7.3*   HEMATOCRIT 20.2*  --   --  22.7*  --  22.6*   PLATELETCT 494*  --   --  502*  --  519*   PROTHROMBTM  --   --  16.0*  --   --   --    INR  --   --  1.24*  --   --   --     < > = values in this interval not displayed.       Imaging  X-Ray:  I have personally reviewed the images and compared with prior images.  CT:    Reviewed  Echo:   Reviewed    Assessment/Plan  * Acute bacterial endocarditis- (present on admission)  Assessment & Plan  MSSA  Cultures cleared 4/17  Infected port removed at [facility]   shunt and spinal stimulator could be seeded, NSGY has consulted on both and don't recommend removal  MV and TV vegetations with septic pulmonary emboli  Extended cefepime  ID consulting  No evidence based surgical indications (valvular CHF, difficult pathogen, etc) so no cardiovascular surgery consult is necessary at this time      Bacteremia due to methicillin susceptible Staphylococcus aureus (MSSA)- (present on admission)  Assessment & Plan  Source presumed right anterior chest line/port with endocarditis  See discussion of endocarditis above        Empyema of right pleural space (HCC)- (present on admission)  Assessment & Plan  R empyema due to septic pulmonary emboli  Chest tube  placement 4/16  At [facility], Upsized 4/18 Dr Benedict, stopped functioning so d/c'ed 4/26  Received TPA/dornase from 4/18-20.  Stopped b/c Hgb dropped requiring transfusion  Hydropneumothorax, very complex pleural space with likely significant trapped lung  Needs decortication, currently non-operative candidate due to her high surgical morbidity/mortality.  Consider re-evaluation for surgery in a few weeks if clinically improving.  Her extensive pneumatoceles will preclude her from spontaneous respiration with adequate ventilation.     Her R lung and pleural space is the primary reason for her ongoing respiratory failure.  On the CT from 2 days ago there is a small amount of leftward shift, unchanged on today's CT. She is not clinically tensioning as her BP is normal and vent pressures are not elevated.  However, she is getting more tachycardic, which could be an early hemodynamic sign of tension physiology.      Options for management include decortication, valves, pigtail tubes into the pneumatoceles. I have discussed the case with numerous colleagues because it is so complex.  I discussed the case with Dr. Arshad for her expertise with valves.  There is not a clear airway target(s) which could effectively decompress the pneumatoceles, so valves would not be a helpful option.  I have reached out to Dr. Ganser for his expertise, however he is not on call this week so I'm unsure if he would be available. Tubes into the pneumatoceles would cause a BPF, so I would want the agreement of a thoracic surgeon before proceeding with tubes to decompress.     Will see repeat CT today        Acute respiratory failure with hypoxia (HCC)- (present on admission)  Assessment & Plan  Intubated 4/15 for respiratory failure at [facility]  Due to hydro/pneumo, trapped lung, lung abscesses  Lung protective ventilation strategies  Appropriate vent bundles  Does not tolerate SBTs, her RSBI get extremely high immediately        Acute encephalopathy-  (present on admission)  Assessment & Plan  Multifactorial including septic and metabolic  Status post lumbar puncture with cytosis, negative Gram stain and cultures NGTD   shunt for pseudotumor cerebri  Not candidate for MRI d/t spinal stimulator  Ct repeat no intracranial abnormalities on 4/21  EEG with encephalopathy and no seizures  Minimize sedation as able  Her problem list includes autoimmune cerebritis.  Need to clarify the history of this, as I don't see anything in her prior d/c summaries about an autoimmune cerebritis    Pleural effusion, left  Assessment & Plan  Loculated fluid  Probably parapneumonic fluid from abscesses  CT guided chest tube placed 4/23, fluid sent  Lymph predominant, NGTD  TPA/dornase one dose on 4/24.  Hold d/t dropping Hgb  CT chest 4/25 with resolution of fluid    Fever  Assessment & Plan  Worsening fever curse 4/25  BAL from 4/24 with sensitive klebsiella  Blood cultures and UA are unrevealing    Pneumonia  Assessment & Plan  MSSA septic emboli with empyema   BAL 4/20 klebsiella pneum  BAL 4/24 still heavy growth of klebsiella  On cefepime    CSF pleocytosis- (present on admission)  Assessment & Plan  Status post lumbar puncture 4/13  WBC 53, 82% polys, Glucose 47, protein 97 Gram stain negative and culture negative at [facility]  Neurosurgery aware,  shunt in place  MRI brain requested by neurosurgery at [facility], not candidate d/t stimulator  Repeat ct no embolic lesions  Abx changed to cefepime for CNS penetration    Acute blood loss anemia- (present on admission)  Assessment & Plan  Hgb dropped again after TPA/dornase on 4/25  No clear source of bleed  D/c TPA/dornase  Hgb stable  Transfuse >7    S/P  shunt- (present on admission)  Assessment & Plan  History of pseudotumor cerebri  History of  shunt by Dr. Oh  Could be seeded by MSSA, Dr. Oh consulted at [facility], recommended MRI but unable given her spinal stimulator    Goals of care, counseling/discussion  Assessment &  Plan  Palliative is consulting  I spoke with Benoit on 4/27. I explained my concern that even if Enedelia were to survive this her QOL would likely be quite a bit worse.  She won't be able to have another port and was receiving IV meds for HA 3-4 times weekly.  She also could have worse headaches after this episode of meningitis.  He asked if she could have brain damage, and I explained that was possible but hard to know right now.  He says part of him thinks Enedelia would want to keep fighting and part of him thinks she would say to stop aggressive care because her QOL was already very limited by her headaches.     Spinal cord stimulator status  Assessment & Plan  Patient's stimulator is Nuvectra. It is FDA approved as MRI compatible, but the company has gone out of business, so there is no support team to assist with MRI.  Thus, if MRI were performed, our radiologists would need to protocol it correctly to manage the stimulator. The former rep from [employer] is Andre, [phone].  Information obtained from Dr. Mahoney's office, [phone].    Thrombocytosis (HCC)  Assessment & Plan  Probably reactive    Anasarca  Assessment & Plan  Mild diffuse peripheral edema  Probably due to critical illness, hypoalbuminemia, prior volume resuscitation  Gentle diuresis    Trapped lung  Assessment & Plan  hydropneumo with rind/trapped lung  Needs decort eventually, not currently an operative candidate    Pulmonary abscess (HCC)  Assessment & Plan  Septic emboli from TV endocarditis    Septic shock (HCC)- (present on admission)  Assessment & Plan  Shock resolved    Hypokalemia  Assessment & Plan  K-scale    History of vasculitis- (present on admission)  Assessment & Plan  Monitor    Prolonged Q-T interval on ECG- (present on admission)  Assessment & Plan  Avoid QT prolonging agents as able  Optimize electrolytes  Cardiac monitoring  EKG    History of complicated migraines- (present on admission)  Assessment & Plan  Home  regimen    Hyponatremia  Assessment & Plan  Mild, monitor    Tachycardia  Assessment & Plan  Worsening today  ddx tension physiology, PE.  LE dopplers negative, so will get PE study which will allow me to see the pleural space well again.     Abnormal LFTs  Assessment & Plan  Chronic alk phos elevation  GGT high c/w liver source  Outpatient workup    H/O: CVA (cerebrovascular accident)- (present on admission)  Assessment & Plan  Monitor    Chronic pain syndrome- (present on admission)  Assessment & Plan  Chronic back pain and intractable headaches  History of nerve stimulator   Dr Vargas consulted, no plan to remove stimulator due to her clinical instability     Very complex case of a 50-year-old female with an extensive history of chronic pain due to migraines and back pain with a nerve stimulator and outpatient infusions of Toradol and Benadryl.  She has previously been diagnosed with pseudotumor cerebri and has a  shunt in place.  She had a port for her outpatient infusions which became infected and resulted in associated endocarditis with pulmonary abscesses and empyema.  She may also have seeded her  shunt, as an LP showed CSF pleocytosis.  She remains with severe encephalopathy and respiratory failure with an exceedingly complex pleural space bilaterally.  Unfortunately, due to how critically ill she is, she is a poor surgical candidate for surgeries such as empyema decortication, pain stimulator removal, or  shunt removal.  She does not tolerate SAT/SBT's due to severe tachypnea and tachycardia.  My impression at this point is that her only chance of survival would be an extended course of antibiotics while receiving mechanical ventilatory support, and if she has clinical improvement the above described surgeries could be considered.  Her risk of mortality from this illness is extremely high.    VTE:  lovenox  Ulcer: H2 Antagonist  Lines: Central Line  Ongoing indication addressed and Lopez Catheter   Ongoing indication addressed    I have performed a physical exam and reviewed and updated ROS and Plan today (4/27/2021). In review of yesterday's note (4/26/2021), there are no changes except as documented above.       Discussed patient condition and risk of morbidity and/or mortality with RN, RT, Pharmacy, Dietary, Code status disscussed and Charge nurse / hot rounds.      The patient remains critically ill requiring mechanical ventilation, ongoing management of very complex pleural space bilaterally.  Critical care time = 85 minutes in directly providing and coordinating critical care and extensive data review.  No time overlap and excludes procedures

## 2021-04-27 NOTE — CARE PLAN
Ventilator Daily Summary    Vent Day #  12  Ventilator settings changed this shift:  no  Weaning trials:  no  Respiratory Procedures:  no  Plan: Continue current ventilator settings and wean mechanical ventilation as tolerated per physician orders.   [phone]

## 2021-04-27 NOTE — ASSESSMENT & PLAN NOTE
Sinus  Gets severely tachycardiac with any attempt at sedation wean  This is likely a combination of prolonged use of sedatives, deconditioning, active cardiopulmonary illness.   I suspect there is some component of withdrawal so I think she needs a long slow taper of both opioid and marni-ergic agents  HTN and tachycardiac are precluding attempts at weaning from the ventilator, so will add coreg

## 2021-04-27 NOTE — ASSESSMENT & PLAN NOTE
Patient's stimulator is Nuvectra. It is FDA approved as MRI compatible, but the company has gone out of business, so there is no support team to assist with MRI.  Thus, if MRI were performed, our radiologists would need to protocol it correctly to manage the stimulator. The former rep from [employer] is Andre, [phone].  Information obtained from Dr. Mahoney's office, [phone].    Per  (5/4/2021), it is not MRI compatible unfortunately.

## 2021-04-27 NOTE — CONSULTS
Thoracic Surgery Consult    Date: 4/27/2021    Requesting Physician: Dr. Ambriz  PCP: Elliott Power M.D.  Attending Physician: John Ganser M.D.      HPI: This is a 50 y.o. 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 She has had several chest tubes and attempt at lytics, but has persistent loculated empyema on the right.     Past Medical History:   Diagnosis Date   • Allergy, unspecified not elsewhere classified    • Anemia 10/05/2017    Unsure if a current issue.   • Anesthesia     Migrane after anesthesia   • Anxiety    • Anxiety, generalized    • Arthritis    • autoimmune    • Autoimmune disorder (HCC)     Unknown etiology or type   • Back pain    • Clostridium difficile diarrhea 2014    follow up tests negative   • Depression    • Elevated liver enzymes 2017    Related to meds.   • Fall    • H/O Clostridium difficile infection 2014   • Hx MRSA infection 2003    with neurostimulator implant   • Hydrocephalus (HCC) 2015    with lumbar shunt   • Hyponatremia 9/28/2019   • Joint pain 09/10/2019    Especially right shoulder   • Migraine with aura, with intractable migraine, so stated, without mention of status migrainosus     from undefined autoimmune issue   • MRSA (methicillin resistant Staphylococcus aureus) 08/2015    to lumbar shunt and power port   • MRSA (methicillin resistant Staphylococcus aureus) 12/2017    left foot autoimmune decay   • MRSA (methicillin resistant Staphylococcus aureus) 2018    to neurostimulator.    • Pituitary abnormality (HCC) 2002   • Port or reservoir infection 10/3/2015   • Requires supplemental oxygen     to treat migraines. 2-5L/NC   • Seizure (HCC) started 2017    Unknown etiology-not sure if related to autoimmune issue. Last seizure 2/2019   • Sepsis (HCC) 8/30/2015   • Staph infection    • Stroke (HCC) 2007     with right side residual weakness       Past Surgical History:   Procedure Laterality Date   • OTHER Left 01/23/2020    Port  placement left chest   • PB IMPLANT NEUROSTIM/  2019    Procedure: INSERTION, NEUROSTIMULATOR, PERMANENT, SPINAL CORD;  Surgeon: Seven Mahoney M.D.;  [facility];  Service: Pain Management   • SPINAL CORD STIMULATOR  2018    Explanted   • FULL THICKNESS SKIN GRAFT Left 2018     graft to foot (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   • OTHER SURGICAL PROCEDURE  11/10/2017    Power port   • LIVER BIOPSY  2017   •  SHUNT INSERTION  2017    Procedure:  SHUNT INSERTION;  Surgeon: Drew Berry M.D.;  Location: SURGERY [facility];  Service:    • SPINAL CORD STIMULATOR  2016    Procedure: SPINAL CORD STIMULATOR FOR: PLACEMENT OF LEFT FLANK OCCIPITAL NERVE STIMULATOR BATTERY PACK;  Surgeon: Oli Oh III, M.D.;  Location: SURGERY [facility];  Service:    • LUMBAR EXPLORATION N/A 2015    Procedure: LUMBAR EXPLORATION- Lumbar shunt removal ;  Surgeon: Oli Oh III, M.D.;  Location: SURGERY [facility];  Service:    • OTHER NEUROLOGICAL SURG  2015    Explanted lumbar shunt and explanted power port due to MRSA   • IRRIGATION & DEBRIDEMENT NEURO N/A 2015    Procedure: IRRIGATION & DEBRIDEMENT NEURO;  Surgeon: Oli Oh III, M.D.;  Location: [facility];  Service:    • SPINAL CORD STIMULATOR      1st implant   • CHOLECYSTECTOMY      had ruptured day before   • ENDOMETRIAL ABLATION     • TUBAL COAGULATION LAPAROSCOPIC BILATERAL Bilateral    • KNEE ARTHROSCOPY Right    • TONSILLECTOMY     • APPENDECTOMY     • OTHER NEUROLOGICAL SURG  [id number]    occipital nerve stimulator-revisions for total of 9 procedures       Current Facility-Administered Medications   Medication Dose Route Frequency Provider Last Rate Last Admin   • potassium chloride in water (KCL) ivpb **Administer in ICU only** 20 mEq  20 mEq Intravenous Once Leti Sun M.D. 50 mL/hr at  04/27/21 1424 20 mEq at 04/27/21 1424    Followed by   • potassium chloride (KCL) ivpb 10 mEq  10 mEq Intravenous Once Leti Sun M.D.       • K+ Scale: Goal of 4.5  1 Each Intravenous Q6HRS Benoit Loya Jr., D.O.   1 Each at 04/27/21 1200   • enoxaparin (LOVENOX) inj 40 mg  40 mg Subcutaneous DAILY Benoit Loya Jr., D.O.   40 mg at 04/27/21 0505   • ceFEPIme (MAXIPIME) 1 g in  mL IVPB  1 g Intravenous Q8HRS LEYLA Hinton.OFrederick   Stopped at 04/27/21 1354   • propofol (DIPRIVAN) injection  0-80 mcg/kg/min Intravenous Continuous Viraj Burgos M.D. 29.6 mL/hr at 04/27/21 1323 60 mcg/kg/min at 04/27/21 1323   • fentaNYL (SUBLIMAZE) 50 mcg/mL in 50mL   Intravenous Continuous Viraj Burgos M.D. 4 mL/hr at 04/27/21 1111 200 mcg/hr at 04/27/21 1111   • Pharmacy Consult: Enteral tube insertion - review meds/change route/product selection  1 Each Other PHARMACY TO DOSE Elliott Salvador M.D.       • acetaminophen (Tylenol) tablet 650 mg  650 mg Enteral Tube Q4HRS PRN Elliott Salvador M.D.   650 mg at 04/27/21 1043   • magnesium hydroxide (MILK OF MAGNESIA) suspension 30 mL  30 mL Enteral Tube QDAY PRN Elliott Salvador M.D.        And   • senna-docusate (PERICOLACE or SENOKOT S) 8.6-50 MG per tablet 2 tablet  2 tablet Enteral Tube BID Elliott Salvador M.D.   Stopped at 04/23/21 1800    And   • polyethylene glycol/lytes (MIRALAX) PACKET 1 Packet  1 Packet Enteral Tube QDAY PRN Elliott Salvador M.D.        And   • bisacodyl (DULCOLAX) suppository 10 mg  10 mg Rectal QDAY PRN Elliott Salvador M.D.       • divalproex (DEPAKOTE SPRINKLE) capsule 500 mg  500 mg Enteral Tube Q8HRS Elliott Salvador M.D.   500 mg at 04/27/21 1323   • DULoxetine (CYMBALTA) capsule 60 mg  60 mg Enteral Tube BID Elliott Salvador M.D.   60 mg at 04/27/21 0505   • PARoxetine (PAXIL) tablet 10 mg  10 mg Enteral Tube QAM Elliott Salvador M.D.   10 mg at 04/27/21 0505   • risperiDONE (RISPERDAL) tablet 2 mg  2 mg Enteral Tube BID  Elliott Salvador M.D.   2 mg at 04/27/21 0505   • Respiratory Therapy Consult   Nebulization Continuous RT Man Wahl M.D.       • famotidine (PEPCID) tablet 20 mg  20 mg Enteral Tube Q12HRS Man Wahl M.D.   20 mg at 04/27/21 0505    Or   • famotidine (PEPCID) injection 20 mg  20 mg Intravenous Q12HRS Man Wahl M.D.   20 mg at 04/25/21 0449   • MD Alert...ICU Electrolyte Replacement per Pharmacy   Other PHARMACY TO DOSE Man Wahl M.D.       • lidocaine (XYLOCAINE) 1 % injection 1-2 mL  1-2 mL Tracheal Tube Q30 MIN PRN Man Wahl M.D.       • ipratropium-albuterol (DUONEB) nebulizer solution  3 mL Nebulization Q2HRS PRN (RT) Man Wahl M.D.       • fentaNYL (SUBLIMAZE) injection 25 mcg  25 mcg Intravenous Q HOUR PRN Man Wahl M.D.        Or   • fentaNYL (SUBLIMAZE) injection 50 mcg  50 mcg Intravenous Q HOUR PRN Man Wahl M.D.        Or   • fentaNYL (SUBLIMAZE) injection 100 mcg  100 mcg Intravenous Q HOUR PRN Man Wahl M.D.   100 mcg at 04/26/21 1708       Social History     Socioeconomic History   • Marital status:      Spouse name: Not on file   • Number of children: Not on file   • Years of education: Not on file   • Highest education level: Not on file   Occupational History   • Occupation:      Comment: on disability   Tobacco Use   • Smoking status: Never Smoker   • Smokeless tobacco: Never Used   Substance and Sexual Activity   • Alcohol use: Not Currently   • Drug use: Never   • Sexual activity: Not on file   Other Topics Concern   • Not on file   Social History Narrative   • Not on file     Social Determinants of Health     Financial Resource Strain:    • Difficulty of Paying Living Expenses:    Food Insecurity:    • Worried About Running Out of Food in the Last Year:    • Ran Out of Food in the Last Year:    Transportation Needs:    • Lack of Transportation (Medical):    • Lack of Transportation  "(Non-Medical):    Physical Activity:    • Days of Exercise per Week:    • Minutes of Exercise per Session:    Stress:    • Feeling of Stress :    Social Connections:    • Frequency of Communication with Friends and Family:    • Frequency of Social Gatherings with Friends and Family:    • Attends Baptism Services:    • Active Member of Clubs or Organizations:    • Attends Club or Organization Meetings:    • Marital Status:    Intimate Partner Violence:    • Fear of Current or Ex-Partner:    • Emotionally Abused:    • Physically Abused:    • Sexually Abused:        Family History   Problem Relation Age of Onset   • Mult Sclerosis Mother    • Diabetes Father    • Alcohol abuse Father        Allergies:  Sumatriptan, Prochlorperazine, and Vancomycin    Review of Systems:  Unable to complete, pt on vent    Physical Exam:  /64   Pulse (!) 123   Temp 37.9 °C (100.2 °F) (Bladder)   Resp (!) 34   Ht 1.6 m (5' 2.99\")   Wt 83.2 kg (183 lb 6.8 oz)   SpO2 97%     Constitutional: she is on vent, sedated  Cardiovascular: Tachycardia  Pulmonary/Chest: No chest movement or breath sounds right    Labs:  Recent Labs     04/25/21  0500 04/25/21  1755 04/26/21  0515 04/26/21  1500 04/27/21  0210   WBC 13.3*  --  13.5*  --  15.2*   RBC 2.18*  --  2.42*  --  2.41*   HEMOGLOBIN 6.6*   < > 7.4* 7.1* 7.3*   HEMATOCRIT 20.2*  --  22.7*  --  22.6*   MCV 92.7  --  93.8  --  93.8   MCH 30.3  --  30.6  --  30.3   MCHC 32.7*  --  32.6*  --  32.3*   RDW 57.9*  --  61.5*  --  61.4*   PLATELETCT 494*  --  502*  --  519*   MPV 8.8*  --  9.0  --  9.0    < > = values in this interval not displayed.     Recent Labs     04/25/21  0500 04/25/21  1250 04/26/21  0515 04/26/21  1500 04/27/21  0210 04/27/21  0500 04/27/21  1236   SODIUM 129*  --  130*  --  129*  --   --    POTASSIUM 3.7   < > 3.8   < > 3.7 3.6 3.2*   CHLORIDE 97  --  100  --  98  --   --    CO2 25  --  25  --  24  --   --    GLUCOSE 89  --  109*  --  101*  --   --    BUN 17  --  " 12  --  14  --   --    CREATININE 0.34*  --  0.26*  --  0.27*  --   --    CALCIUM 7.8*  --  8.0*  --  8.1*  --   --     < > = values in this interval not displayed.     Recent Labs     04/25/21  1812   INR 1.24*     Recent Labs     04/25/21  1755 04/25/21  1812 04/26/21  0515   ASTSGOT  --   --  27   ALTSGPT  --   --  13   TBILIRUBIN  --   --  1.2   ALKPHOSPHAT  --   --  323*   GLOBULIN  --   --  4.8*   INR  --  1.24*  --    AMMONIA 32  --   --        Radiology:  DX-CHEST-LIMITED (1 VIEW)   Final Result         No significant interval change.      CT-CTA CHEST PULMONARY ARTERY W/ RECONS   Final Result         No CT evidence of pulmonary embolus.      Previous right chest tube were removed.      Grossly similar in size of the loculated right hydropneumothorax but there is increased layering fluid component. Unchanged mild shift of the mediastinal to the left.      Redemonstration of a pigtail catheter in the medial left lung base. Grossly similar multiple cavitary lesions in the left lung.      US-EXTREMITY VENOUS LOWER BILAT   Final Result      POCT BUTTERFLY-DUPLEX SCAN EXTREMITY VEINS LIMITED   Final Result      DX-CHEST-PORTABLE (1 VIEW)   Final Result         1.  Pulmonary edema and/or infiltrates, similar to prior study.   2.  Right pneumothorax, somewhat decreased to prior study, interval removal of right thoracostomy tube is noted.   3.  Multiple cavitary appearing left pulmonary lesions, see CT performed yesterday for further characterization   4.  Soft tissue gas in the right chest wall      DX-CHEST-PORTABLE (1 VIEW)   Final Result         1.  Pulmonary edema and/or infiltrates, similar to prior study.   2.  Right pneumothorax, similar to prior study with thoracostomy tube in place.   3.  Multiple cavitary appearing left pulmonary lesions, see CT performed yesterday for further characterization      CT-CHEST (THORAX) W/O   Final Result      Large loculated right hydropneumothorax with interval increase in  size of the pneumothorax and pleural fluid.      Right chest tube is in the posterior right thorax.      There is mediastinal shift to the left compatible with tension.      Small pericardial effusion.      Small loculated left pleural effusion with overlying atelectasis/consolidation.   Pigtail catheter is seen at the medial left lung base. Pleural effusion has decreased in size compared to prior.      There are multiple foci of air extending from the right lung to the pleural space suspicious for a bronchopleural fistula.      Multiple cavitary lesions bilaterally with mild interval decrease of the solid component of the cavitary nodules.      Noncavitary 3.1 cm masslike density is seen at the left lung base.      Findings may be related to septic emboli, malignancy or multifocal abscesses. Short interval follow-up recommended.      Soft tissue air on the right is again seen.      Splenomegaly      Findings discussed with Leti Sun.      DX-CHEST-PORTABLE (1 VIEW)   Final Result      No significant change from prior exam.      CT-HEAD W/O   Final Result      1.  RIGHT frontal ventriculostomy catheter unchanged in position.   2.  Mild cortical atrophy.   3.  No intracranial hemorrhage.   4.  Apparent dural thickening overlying the clivus.  Consider further evaluation with contrast-enhanced MRI.      DX-CHEST-LIMITED (1 VIEW)   Final Result      Interval left basilar pigtail catheter with diminished atelectasis, pleural fluid      Stable right hydropneumothorax with thoracostomy tube in place, considerable soft tissue gas and partial lung collapse      IR-CHEST TUBE-EMPYEMA LEFT   Final Result      1.  CT GUIDED LEFT  10 Polish PIGTAIL CHEST TUBE PLACEMENT FOR POSSIBLE EMPYEMA YIELDING OLD BLOODY FLUID.      2. A CHEST RADIOGRAPH IS FORTHCOMING.      EC-MICAH W/O CONT   Final Result      US-ABDOMEN LTD (SOFT TISSUE)   Final Result      No fluid seen surrounding the electronic implanted spinal stimulator device.       DX-CHEST-LIMITED (1 VIEW)   Final Result      1.  Large right hydropneumothorax with right-sided chest tube in place, likely stable to slightly decreased from prior study.   2.  Small left pleural effusion. Mild improved aeration in the left lung.   3.  Bilateral pulmonary opacities which could be related to combination of atelectasis, edema and/or infection..      CT-CTA HEAD WITH & W/O-POST PROCESS   Final Result      1.  Patent carotid and vertebral arteries.      2.  Again seen right frontal the jugular peritoneal shunt catheter. There is mild increase in volume of the lateral and third ventricles.      CT-CHEST (THORAX) W/O   Final Result      1.  Interval placement of a right-sided chest tube into a large loculated right-sided pleural effusion. There is now seen a large right-sided hydropneumothorax in the area that previously seen fluid. The chest tube extends into that hydropneumothorax.    There is some residual pleural fluid seen as well. A hydropneumothorax is somewhat loculated with components most prominently inferiorly and medially.      2.  New loculated left pleural effusion.      3.  Again seen multiple bilateral cavitary pulmonary masses which are more prominent than previously seen with increasing cavitation and measure up to 3 cm size. Differential diagnosis includes septic emboli, multifocal abscesses and neoplasm.      4.  Large amount of subcutaneous emphysema on the right.      5.  Splenomegaly.      6.  Multiple support devices.      Fleischner Society pulmonary nodule recommendations:         DX-CHEST-LIMITED (1 VIEW)   Final Result      1.  Support devices as described above.      2.  Right chest tube present with a again seen right-sided pneumothorax, possibly increased in size and loculated.      3.  Worsening bilateral pulmonary infiltrates.      DX-ABDOMEN FOR TUBE PLACEMENT   Final Result      Cortrak feeding tube tip projects in the region of the duodenal bulb.       DX-CHEST-LIMITED (1 VIEW)   Final Result      Loculated right pneumothorax is decreased in size compared to prior.      Small left pleural effusion.      Small loculated right pleural effusion.      Extensive bilateral airspace opacities are not significantly changed.      Soft tissue air on the right is again noted.      DX-CHEST-LIMITED (1 VIEW)   Final Result      Decreased partially loculated right pleural fluid with increased pleural air. Right chest tube is in place.      Increased hazy opacity in the left lung possibly atelectasis.         US-ABDOMEN LTD (SOFT TISSUE)   Final Result      No drainable fluid collection.      DX-CHEST-PORTABLE (1 VIEW)   Final Result      Decreased partially loculated right pleural fluid with increased pleural air. Right chest tube is in place.      No other significant change.      DX-CHEST-PORTABLE (1 VIEW)   Final Result         1.  Pulmonary edema and/or infiltrates are identified, which are somewhat decreased since the prior exam.   2.  Moderate loculated appearing right pleural effusion, slightly decreased since prior      DX-ABDOMEN FOR TUBE PLACEMENT   Final Result      Feeding tube tip at the distal stomach.      DX-CHEST-PORTABLE (1 VIEW)   Final Result         1.  New chest tube is noted in the right lower hemithorax.      2.  Right pleural effusion is again identified which appears similar to the prior exam.      3.  No new infiltrates or consolidations are identified.      DX-CHEST-PORTABLE (1 VIEW)   Final Result         1.  Pulmonary edema and/or infiltrates are identified, which are stable since the prior exam.   2.  Moderate loculated appearing right pleural effusion      DX-CHEST-PORTABLE (1 VIEW)   Final Result         No significant interval change.          Assessment: This is a 50 y.o. with loculated right empyema      Plan: I discussed with Dr. Ambriz and patient's mother the option of thoracoscopic decortication with the possible need for thoracotomy.  There is significant risk of complication given the patients underlying condition, but she will be unable to make significant progress weaning from the vent unless we can get the right lung functioning. Will plan surgery for tomorrow afternoon.    Thank you very much for this consultation.    John Ganser M.D.  [facility]  [phone]

## 2021-04-27 NOTE — PROGRESS NOTES
1100-Tachypnea on vent rate 35-40 Tachycardic rate of 130. Attempted sedation with fentanyl push with little improvement to rate. Increased propofol drip and fentanyl drip. Updated Dr Sun. Order for STAT CXR and CTA chest.

## 2021-04-27 NOTE — CARE PLAN
Problem: Safety - Medical Restraint  Goal: Remains free of injury from restraints (Restraint for Interference with Medical Device)  Description: INTERVENTIONS:  1. Determine that other, less restrictive measures have been tried or would not be effective before applying the restraint  2. Evaluate the patient's condition at the time of restraint application  3. Inform patient/family regarding the reason for restraint  4. Q2H: Monitor safety, psychosocial status, comfort, nutrition and hydration  Outcome: PROGRESSING AS EXPECTED  Flowsheets (Taken 4/25/2021 0555 by Deanna Lacy, R.N.)  Addressed this shift: Remains free of injury from restraints (restraint for interference with medical device):   Determine that other, less restrictive measures have been tried or would not be effective before applying the restraint   Evaluate the patient's condition at the time of restraint application   Inform patient/family regarding the reason for restraint   Every 2 hours: Monitor safety, psychosocial status, comfort, nutrition and hydration  Goal: Free from restraint(s) (Restraint for Interference with Medical Device)  Description: INTERVENTIONS:  1. ONCE/SHIFT or MINIMUM Q12H: Assess and document the continuing need for restraints  2. Q24H: Continued use of restraint requires LIP to perform face to face examination and written order  3. Identify and implement measures to help patient regain control  Outcome: PROGRESSING AS EXPECTED  Flowsheets (Taken 4/16/2021 8806 by Cony Izquierdo, R.N.)  Addressed this shift: Free from restraint(s) (restraint for interference with medical device):   ONCE/SHIFT or MINIMUM Every 12 hours: Assess and document the continuing need for restraints   Every 24 hours: Continued use of restraint requires Licensed Independent Practitioner to perform face to face examination and written order   Identify and implement measures to help patient regain control     Problem: Skin Integrity  Goal:  Risk for impaired skin integrity will decrease  Outcome: PROGRESSING AS EXPECTED  Intervention: Implement precautions to protect skin integrity in collaboration with the interdisciplinary team  Flowsheets (Taken 4/26/2021 2000)  Skin Preventative Measures:   Heel Protectors in Use   Pillows in Use for Support / Positioning   Waffle Cushion  Bed Types: Low Air Loss Mattress  Friction Interventions: Draw Sheet / Pad Used for Repositioning  Patient Turns / Repositioning:   Right Side   Reposition - RUE   Reposition - LUE   Reposition - RLE   Reposition - LLE  PT / OT Involved in Care: Order has been Placed  Moisturizers/Barriers: Barrier Paste  Patient is Receiving Nutrition: Tube Feeding Nutrition  Vitamin Therapy in Use: Yes  Activity: Bed  Nutrition Consult Ordered: No, Consult has Already been Placed  Assistance / Tolerance for Turning/Repositioning: Assistance of Two or More     Problem: Pain Management  Goal: Pain level will decrease to patient's comfort goal  Outcome: PROGRESSING AS EXPECTED  Flowsheets (Taken 4/26/2021 2000)  Critical-Care Pain Observation Score: 0

## 2021-04-28 ENCOUNTER — APPOINTMENT (OUTPATIENT)
Dept: RADIOLOGY | Facility: MEDICAL CENTER | Age: 51
DRG: 003 | End: 2021-04-28
Attending: SURGERY
Payer: MEDICARE

## 2021-04-28 ENCOUNTER — APPOINTMENT (OUTPATIENT)
Dept: RADIOLOGY | Facility: MEDICAL CENTER | Age: 51
DRG: 003 | End: 2021-04-28
Attending: INTERNAL MEDICINE
Payer: MEDICARE

## 2021-04-28 ENCOUNTER — ANESTHESIA (OUTPATIENT)
Dept: SURGERY | Facility: MEDICAL CENTER | Age: 51
DRG: 003 | End: 2021-04-28
Payer: MEDICARE

## 2021-04-28 ENCOUNTER — ANESTHESIA EVENT (OUTPATIENT)
Dept: SURGERY | Facility: MEDICAL CENTER | Age: 51
DRG: 003 | End: 2021-04-28
Payer: MEDICARE

## 2021-04-28 LAB
ANION GAP SERPL CALC-SCNC: 8 MMOL/L (ref 7–16)
ANISOCYTOSIS BLD QL SMEAR: ABNORMAL
BASE EXCESS BLDA CALC-SCNC: -3 MMOL/L (ref -4–3)
BASE EXCESS BLDA CALC-SCNC: 0 MMOL/L (ref -4–3)
BASE EXCESS BLDA CALC-SCNC: 1 MMOL/L (ref -4–3)
BASOPHILS # BLD AUTO: 1.7 % (ref 0–1.8)
BASOPHILS # BLD: 0.47 K/UL (ref 0–0.12)
BODY TEMPERATURE: ABNORMAL DEGREES
BREATHS SETTING VENT: 16
BREATHS SETTING VENT: 20
BREATHS SETTING VENT: 20
BUN SERPL-MCNC: 11 MG/DL (ref 8–22)
CALCIUM SERPL-MCNC: 8.2 MG/DL (ref 8.5–10.5)
CHLORIDE SERPL-SCNC: 99 MMOL/L (ref 96–112)
CO2 BLDA-SCNC: 25 MMOL/L (ref 20–33)
CO2 BLDA-SCNC: 25 MMOL/L (ref 20–33)
CO2 BLDA-SCNC: 27 MMOL/L (ref 20–33)
CO2 SERPL-SCNC: 24 MMOL/L (ref 20–33)
CREAT SERPL-MCNC: 0.19 MG/DL (ref 0.5–1.4)
DELSYS IDSYS: ABNORMAL
END TIDAL CARBON DIOXIDE IECO2: 34 MMHG
END TIDAL CARBON DIOXIDE IECO2: 37 MMHG
END TIDAL CARBON DIOXIDE IECO2: 41 MMHG
EOSINOPHIL # BLD AUTO: 0 K/UL (ref 0–0.51)
EOSINOPHIL NFR BLD: 0 % (ref 0–6.9)
ERYTHROCYTE [DISTWIDTH] IN BLOOD BY AUTOMATED COUNT: 63.1 FL (ref 35.9–50)
GLUCOSE SERPL-MCNC: 91 MG/DL (ref 65–99)
HCO3 BLDA-SCNC: 23.8 MMOL/L (ref 17–25)
HCO3 BLDA-SCNC: 24 MMOL/L (ref 17–25)
HCO3 BLDA-SCNC: 25.9 MMOL/L (ref 17–25)
HCT VFR BLD AUTO: 20.9 % (ref 37–47)
HGB BLD-MCNC: 6.6 G/DL (ref 12–16)
HGB BLD-MCNC: 8.8 G/DL (ref 12–16)
HOROWITZ INDEX BLDA+IHG-RTO: 129 MM[HG]
HOROWITZ INDEX BLDA+IHG-RTO: 287 MM[HG]
HOROWITZ INDEX BLDA+IHG-RTO: 897 MM[HG]
HYPOCHROMIA BLD QL SMEAR: ABNORMAL
LYMPHOCYTES # BLD AUTO: 0.47 K/UL (ref 1–4.8)
LYMPHOCYTES NFR BLD: 1.7 % (ref 22–41)
MACROCYTES BLD QL SMEAR: ABNORMAL
MAGNESIUM SERPL-MCNC: 1.8 MG/DL (ref 1.5–2.5)
MANUAL DIFF BLD: NORMAL
MCH RBC QN AUTO: 30.3 PG (ref 27–33)
MCHC RBC AUTO-ENTMCNC: 31.6 G/DL (ref 33.6–35)
MCV RBC AUTO: 95.9 FL (ref 81.4–97.8)
MODE IMODE: ABNORMAL
MONOCYTES # BLD AUTO: 2.11 K/UL (ref 0–0.85)
MONOCYTES NFR BLD AUTO: 7.6 % (ref 0–13.4)
MORPHOLOGY BLD-IMP: NORMAL
MYELOCYTES NFR BLD MANUAL: 1.7 %
NEUTROPHILS # BLD AUTO: 24.18 K/UL (ref 2–7.15)
NEUTROPHILS NFR BLD: 84.8 % (ref 44–72)
NEUTS BAND NFR BLD MANUAL: 2.5 % (ref 0–10)
NRBC # BLD AUTO: 0 K/UL
NRBC BLD-RTO: 0 /100 WBC
O2/TOTAL GAS SETTING VFR VENT: 30 %
O2/TOTAL GAS SETTING VFR VENT: 30 %
O2/TOTAL GAS SETTING VFR VENT: 70 %
PCO2 BLDA: 34.4 MMHG (ref 26–37)
PCO2 BLDA: 39.2 MMHG (ref 26–37)
PCO2 BLDA: 48.8 MMHG (ref 26–37)
PCO2 TEMP ADJ BLDA: 35.5 MMHG (ref 26–37)
PCO2 TEMP ADJ BLDA: 41.9 MMHG (ref 26–37)
PCO2 TEMP ADJ BLDA: 48.8 MMHG (ref 26–37)
PEEP END EXPIRATORY PRESSURE IPEEP: 8 CMH20
PH BLDA: 7.3 [PH] (ref 7.4–7.5)
PH BLDA: 7.43 [PH] (ref 7.4–7.5)
PH BLDA: 7.45 [PH] (ref 7.4–7.5)
PH TEMP ADJ BLDA: 7.3 [PH] (ref 7.4–7.5)
PH TEMP ADJ BLDA: 7.41 [PH] (ref 7.4–7.5)
PH TEMP ADJ BLDA: 7.44 [PH] (ref 7.4–7.5)
PLATELET # BLD AUTO: 482 K/UL (ref 164–446)
PLATELET BLD QL SMEAR: NORMAL
PMV BLD AUTO: 9.3 FL (ref 9–12.9)
PO2 BLDA: 269 MMHG (ref 64–87)
PO2 BLDA: 86 MMHG (ref 64–87)
PO2 BLDA: 90 MMHG (ref 64–87)
PO2 TEMP ADJ BLDA: 276 MMHG (ref 64–87)
PO2 TEMP ADJ BLDA: 90 MMHG (ref 64–87)
PO2 TEMP ADJ BLDA: 90 MMHG (ref 64–87)
POLYCHROMASIA BLD QL SMEAR: NORMAL
POTASSIUM SERPL-SCNC: 3.9 MMOL/L (ref 3.6–5.5)
POTASSIUM SERPL-SCNC: 4.1 MMOL/L (ref 3.6–5.5)
POTASSIUM SERPL-SCNC: 4.3 MMOL/L (ref 3.6–5.5)
RBC # BLD AUTO: 2.18 M/UL (ref 4.2–5.4)
RBC BLD AUTO: PRESENT
SAO2 % BLDA: 100 % (ref 93–99)
SAO2 % BLDA: 96 % (ref 93–99)
SAO2 % BLDA: 97 % (ref 93–99)
SARS-COV-2 RNA RESP QL NAA+PROBE: NOTDETECTED
SODIUM SERPL-SCNC: 131 MMOL/L (ref 135–145)
SPECIMEN DRAWN FROM PATIENT: ABNORMAL
SPECIMEN SOURCE: NORMAL
TIDAL VOLUME IVT: 250 ML
TIDAL VOLUME IVT: 250 ML
TIDAL VOLUME IVT: 320 ML
WBC # BLD AUTO: 27.7 K/UL (ref 4.8–10.8)

## 2021-04-28 PROCEDURE — 84132 ASSAY OF SERUM POTASSIUM: CPT | Mod: 91

## 2021-04-28 PROCEDURE — 700101 HCHG RX REV CODE 250: Performed by: SURGERY

## 2021-04-28 PROCEDURE — 87205 SMEAR GRAM STAIN: CPT

## 2021-04-28 PROCEDURE — 87015 SPECIMEN INFECT AGNT CONCNTJ: CPT

## 2021-04-28 PROCEDURE — 80048 BASIC METABOLIC PNL TOTAL CA: CPT

## 2021-04-28 PROCEDURE — 71045 X-RAY EXAM CHEST 1 VIEW: CPT

## 2021-04-28 PROCEDURE — 83735 ASSAY OF MAGNESIUM: CPT

## 2021-04-28 PROCEDURE — 700111 HCHG RX REV CODE 636 W/ 250 OVERRIDE (IP): Performed by: INTERNAL MEDICINE

## 2021-04-28 PROCEDURE — 700102 HCHG RX REV CODE 250 W/ 637 OVERRIDE(OP): Performed by: INTERNAL MEDICINE

## 2021-04-28 PROCEDURE — 36600 WITHDRAWAL OF ARTERIAL BLOOD: CPT

## 2021-04-28 PROCEDURE — 700105 HCHG RX REV CODE 258: Performed by: INTERNAL MEDICINE

## 2021-04-28 PROCEDURE — C1729 CATH, DRAINAGE: HCPCS | Performed by: SURGERY

## 2021-04-28 PROCEDURE — 700111 HCHG RX REV CODE 636 W/ 250 OVERRIDE (IP): Performed by: ANESTHESIOLOGY

## 2021-04-28 PROCEDURE — 94003 VENT MGMT INPAT SUBQ DAY: CPT

## 2021-04-28 PROCEDURE — 85027 COMPLETE CBC AUTOMATED: CPT

## 2021-04-28 PROCEDURE — 85007 BL SMEAR W/DIFF WBC COUNT: CPT

## 2021-04-28 PROCEDURE — 0BNK4ZZ RELEASE RIGHT LUNG, PERCUTANEOUS ENDOSCOPIC APPROACH: ICD-10-PCS | Performed by: SURGERY

## 2021-04-28 PROCEDURE — P9016 RBC LEUKOCYTES REDUCED: HCPCS | Mod: 91

## 2021-04-28 PROCEDURE — 700105 HCHG RX REV CODE 258: Performed by: ANESTHESIOLOGY

## 2021-04-28 PROCEDURE — 85018 HEMOGLOBIN: CPT

## 2021-04-28 PROCEDURE — 86923 COMPATIBILITY TEST ELECTRIC: CPT

## 2021-04-28 PROCEDURE — 37799 UNLISTED PX VASCULAR SURGERY: CPT

## 2021-04-28 PROCEDURE — A6402 STERILE GAUZE <= 16 SQ IN: HCPCS | Performed by: SURGERY

## 2021-04-28 PROCEDURE — 99291 CRITICAL CARE FIRST HOUR: CPT | Performed by: INTERNAL MEDICINE

## 2021-04-28 PROCEDURE — 501570 HCHG TROCAR, SEPARATOR: Performed by: SURGERY

## 2021-04-28 PROCEDURE — 82803 BLOOD GASES ANY COMBINATION: CPT | Mod: 91

## 2021-04-28 PROCEDURE — 770022 HCHG ROOM/CARE - ICU (200)

## 2021-04-28 PROCEDURE — 87077 CULTURE AEROBIC IDENTIFY: CPT

## 2021-04-28 PROCEDURE — 160041 HCHG SURGERY MINUTES - EA ADDL 1 MIN LEVEL 4: Performed by: SURGERY

## 2021-04-28 PROCEDURE — 700105 HCHG RX REV CODE 258: Performed by: SURGERY

## 2021-04-28 PROCEDURE — 501583 HCHG TROCAR, THRD CAN&SEAL 5X100: Performed by: SURGERY

## 2021-04-28 PROCEDURE — 501838 HCHG SUTURE GENERAL: Performed by: SURGERY

## 2021-04-28 PROCEDURE — 0W994ZZ DRAINAGE OF RIGHT PLEURAL CAVITY, PERCUTANEOUS ENDOSCOPIC APPROACH: ICD-10-PCS | Performed by: SURGERY

## 2021-04-28 PROCEDURE — 160009 HCHG ANES TIME/MIN: Performed by: SURGERY

## 2021-04-28 PROCEDURE — 87070 CULTURE OTHR SPECIMN AEROBIC: CPT

## 2021-04-28 PROCEDURE — 36430 TRANSFUSION BLD/BLD COMPNT: CPT

## 2021-04-28 PROCEDURE — 87075 CULTR BACTERIA EXCEPT BLOOD: CPT

## 2021-04-28 PROCEDURE — 87186 SC STD MICRODIL/AGAR DIL: CPT

## 2021-04-28 PROCEDURE — 0WC94ZZ EXTIRPATION OF MATTER FROM RIGHT PLEURAL CAVITY, PERCUTANEOUS ENDOSCOPIC APPROACH: ICD-10-PCS | Performed by: SURGERY

## 2021-04-28 PROCEDURE — A9270 NON-COVERED ITEM OR SERVICE: HCPCS | Performed by: INTERNAL MEDICINE

## 2021-04-28 PROCEDURE — 94799 UNLISTED PULMONARY SVC/PX: CPT

## 2021-04-28 PROCEDURE — 160029 HCHG SURGERY MINUTES - 1ST 30 MINS LEVEL 4: Performed by: SURGERY

## 2021-04-28 PROCEDURE — 160048 HCHG OR STATISTICAL LEVEL 1-5: Performed by: SURGERY

## 2021-04-28 RX ORDER — NOREPINEPHRINE BITARTRATE 0.03 MG/ML
0-30 INJECTION, SOLUTION INTRAVENOUS CONTINUOUS
Status: DISCONTINUED | OUTPATIENT
Start: 2021-04-28 | End: 2021-04-28

## 2021-04-28 RX ORDER — SODIUM CHLORIDE, SODIUM LACTATE, POTASSIUM CHLORIDE, CALCIUM CHLORIDE 600; 310; 30; 20 MG/100ML; MG/100ML; MG/100ML; MG/100ML
INJECTION, SOLUTION INTRAVENOUS
Status: COMPLETED | OUTPATIENT
Start: 2021-04-28 | End: 2021-04-28

## 2021-04-28 RX ORDER — POTASSIUM CHLORIDE 7.45 MG/ML
10 INJECTION INTRAVENOUS ONCE
Status: COMPLETED | OUTPATIENT
Start: 2021-04-28 | End: 2021-04-28

## 2021-04-28 RX ORDER — SODIUM CHLORIDE, SODIUM LACTATE, POTASSIUM CHLORIDE, CALCIUM CHLORIDE 600; 310; 30; 20 MG/100ML; MG/100ML; MG/100ML; MG/100ML
INJECTION, SOLUTION INTRAVENOUS
Status: DISCONTINUED | OUTPATIENT
Start: 2021-04-28 | End: 2021-04-28 | Stop reason: SURG

## 2021-04-28 RX ORDER — PHENYLEPHRINE HCL IN 0.9% NACL 0.5 MG/5ML
SYRINGE (ML) INTRAVENOUS PRN
Status: DISCONTINUED | OUTPATIENT
Start: 2021-04-28 | End: 2021-04-28 | Stop reason: SURG

## 2021-04-28 RX ORDER — MAGNESIUM SULFATE HEPTAHYDRATE 40 MG/ML
2 INJECTION, SOLUTION INTRAVENOUS ONCE
Status: COMPLETED | OUTPATIENT
Start: 2021-04-28 | End: 2021-04-28

## 2021-04-28 RX ORDER — MAGNESIUM HYDROXIDE 1200 MG/15ML
LIQUID ORAL
Status: COMPLETED | OUTPATIENT
Start: 2021-04-28 | End: 2021-04-28

## 2021-04-28 RX ADMIN — FENTANYL CITRATE 100 MCG: 50 INJECTION, SOLUTION INTRAMUSCULAR; INTRAVENOUS at 00:53

## 2021-04-28 RX ADMIN — Medication 2500 MCG: at 02:15

## 2021-04-28 RX ADMIN — FAMOTIDINE 20 MG: 10 INJECTION INTRAVENOUS at 05:38

## 2021-04-28 RX ADMIN — Medication 200 MCG: at 17:12

## 2021-04-28 RX ADMIN — CEFEPIME 1 G: 1 INJECTION, POWDER, FOR SOLUTION INTRAMUSCULAR; INTRAVENOUS at 14:30

## 2021-04-28 RX ADMIN — RISPERIDONE 2 MG: 1 TABLET, FILM COATED ORAL at 05:38

## 2021-04-28 RX ADMIN — PROPOFOL 60 MCG/KG/MIN: 10 INJECTION, EMULSION INTRAVENOUS at 02:32

## 2021-04-28 RX ADMIN — PROPOFOL 60 MCG/KG/MIN: 10 INJECTION, EMULSION INTRAVENOUS at 07:52

## 2021-04-28 RX ADMIN — PROPOFOL 60 MCG/KG/MIN: 10 INJECTION, EMULSION INTRAVENOUS at 11:22

## 2021-04-28 RX ADMIN — CEFEPIME 1 G: 1 INJECTION, POWDER, FOR SOLUTION INTRAMUSCULAR; INTRAVENOUS at 21:15

## 2021-04-28 RX ADMIN — MAGNESIUM SULFATE IN WATER 2 G: 40 INJECTION, SOLUTION INTRAVENOUS at 07:06

## 2021-04-28 RX ADMIN — SODIUM CHLORIDE, POTASSIUM CHLORIDE, SODIUM LACTATE AND CALCIUM CHLORIDE: 600; 310; 30; 20 INJECTION, SOLUTION INTRAVENOUS at 16:07

## 2021-04-28 RX ADMIN — POTASSIUM CHLORIDE 10 MEQ: 7.46 INJECTION, SOLUTION INTRAVENOUS at 00:53

## 2021-04-28 RX ADMIN — DULOXETINE HYDROCHLORIDE 60 MG: 60 CAPSULE, DELAYED RELEASE ORAL at 05:38

## 2021-04-28 RX ADMIN — ACETAMINOPHEN 650 MG: 325 TABLET, FILM COATED ORAL at 02:32

## 2021-04-28 RX ADMIN — CEFEPIME 1 G: 1 INJECTION, POWDER, FOR SOLUTION INTRAMUSCULAR; INTRAVENOUS at 04:55

## 2021-04-28 RX ADMIN — Medication 200 MCG: at 11:23

## 2021-04-28 RX ADMIN — FENTANYL CITRATE 50 MCG: 50 INJECTION, SOLUTION INTRAMUSCULAR; INTRAVENOUS at 16:15

## 2021-04-28 RX ADMIN — PROPOFOL 60 MCG/KG/MIN: 10 INJECTION, EMULSION INTRAVENOUS at 22:08

## 2021-04-28 RX ADMIN — DIVALPROEX SODIUM 500 MG: 125 CAPSULE ORAL at 05:37

## 2021-04-28 RX ADMIN — FENTANYL CITRATE 50 MCG: 50 INJECTION, SOLUTION INTRAMUSCULAR; INTRAVENOUS at 16:19

## 2021-04-28 RX ADMIN — DIVALPROEX SODIUM 500 MG: 125 CAPSULE ORAL at 14:30

## 2021-04-28 RX ADMIN — PROPOFOL 60 MCG/KG/MIN: 10 INJECTION, EMULSION INTRAVENOUS at 19:31

## 2021-04-28 RX ADMIN — POTASSIUM CHLORIDE 10 MEQ: 7.46 INJECTION, SOLUTION INTRAVENOUS at 05:37

## 2021-04-28 RX ADMIN — Medication 200 MCG: at 17:16

## 2021-04-28 RX ADMIN — PAROXETINE HYDROCHLORIDE 10 MG: 20 TABLET, FILM COATED ORAL at 05:38

## 2021-04-28 RX ADMIN — PROPOFOL 70 MCG/KG/MIN: 10 INJECTION, EMULSION INTRAVENOUS at 04:55

## 2021-04-28 RX ADMIN — DIVALPROEX SODIUM 500 MG: 125 CAPSULE ORAL at 21:15

## 2021-04-28 RX ADMIN — FENTANYL CITRATE 100 MCG: 50 INJECTION, SOLUTION INTRAMUSCULAR; INTRAVENOUS at 02:38

## 2021-04-28 RX ADMIN — PROPOFOL 60 MCG/KG/MIN: 10 INJECTION, EMULSION INTRAVENOUS at 14:30

## 2021-04-28 RX ADMIN — FENTANYL CITRATE 100 MCG: 50 INJECTION, SOLUTION INTRAMUSCULAR; INTRAVENOUS at 21:07

## 2021-04-28 ASSESSMENT — PAIN SCALES - GENERAL: PAIN_LEVEL: 0

## 2021-04-28 ASSESSMENT — PAIN DESCRIPTION - PAIN TYPE
TYPE: ACUTE PAIN
TYPE: ACUTE PAIN
TYPE: CHRONIC PAIN
TYPE: ACUTE PAIN
TYPE: CHRONIC PAIN
TYPE: ACUTE PAIN
TYPE: ACUTE PAIN
TYPE: CHRONIC PAIN
TYPE: ACUTE PAIN

## 2021-04-28 ASSESSMENT — FIBROSIS 4 INDEX: FIB4 SCORE: 0.69

## 2021-04-28 NOTE — CARE PLAN
Ventilator Daily Summary     Vent Day #  13  Ventilator settings changed this shift:  Reduced FiO2 to 30%  Weaning trials:  No  Respiratory Procedures:  No  Plan: Continue current ventilator settings and wean mechanical ventilation as tolerated per physician orders.  [phone]

## 2021-04-28 NOTE — PROGRESS NOTES
"Critical Care Progress Note    Date of admission  4/16/2021    Chief Complaint  50 y.o. female the past medical history of pseudotumor cerebri status post  shunt by Dr. Oh, chronic pain with history of placement of nerve stimulator, vasculitis, right anterior chest port for home IV meds with recurrent infection who presented 4/11/2021 with to [facility] with recurrent port infection and was initially treated with vancomycin and Zosyn and then changed to ceftaroline and subsequently switched to nafcillin when MSSA was identified.    Hospital Course  \"50 y.o. female the past medical history of pseudotumor cerebri status post  shunt by Dr. Oh, chronic pain with history of placement of nerve stimulator, vasculitis, right anterior chest port for home IV meds with recurrent infection who presented 4/11/2021 with to [facility] with recurrent port infection. Found to have MSSA bacteremia, endocarditis, septic pulmonary emboli/empyema/pneumatocele, and CSF pleocytosis concerning for  shunt involvement.  Seen by Dr. Oh, NSGY, at [facility] who did not recommend  shunt removal.  Seen by Dr. Vargas here for concern of fluid collection around her spinal stimulator and he recommended against removal. Remains intubated, encephalopathic.    4/16 admitted to ICU  4/17 VD 2, 2 FFP, pRBC overnight; new chest tube per gen surg  4/18 VD 3, TPa/Dornase with 1400 cc from R chest tube    4/26: R chest tube not functioning, d/c it.  broke his leg and going to OR today, so not available currently.  VD 11.  RSBI immediately high on SBT attempt. Followed commands for RN      Interval Problem Update  Neuro: sedation on until surgery, doesn't tolerate SAT  HR: 100-110s  SBP: 90-130s  Tmax: 38.5, ongoing fever  GI: TF at goal, currently NPO for OR.  Increase bowel regimen after OR  I/O: nevarez  Lines: L subclavian TLC  Mobility: contraindicated d/t unstable respiratory status  Resp: VD 13, 20/250/8/30%. No SBT pre-OR   Vte: lovenox held " today  PPI/H2:pepcid  Antibx: cefepime extended course    Case discussed with Dr. Ganser-plan for decortication this afternoon  Worsening leukocytosis-likely d/t lack of source control in the R chest.  Cannot get MR of spine, but will consider CT-discussed with radiology.  CT is much inferior, but would be the only other option.  K scale  Replete Mag      Review of Systems  Review of Systems   Unable to perform ROS: Intubated        Vital Signs for last 24 hours   Pulse:  [] 116  Resp:  [16-35] 18  BP: ()/(45-80) 95/49  SpO2:  [68 %-100 %] 100 %    Hemodynamic parameters for last 24 hours       Respiratory Information for the last 24 hours  Vent Mode: APVCMV  Rate (breaths/min): 16  Vt Target (mL): 320  PEEP/CPAP: 8  MAP: 17  Control VTE (exp VT): 301    Physical Exam   Physical Exam  Constitutional:       Appearance: She is ill-appearing.      Interventions: She is sedated and intubated.   HENT:      Mouth/Throat:      Mouth: Mucous membranes are moist.   Eyes:      Pupils: Pupils are equal, round, and reactive to light.   Cardiovascular:      Rate and Rhythm: Regular rhythm. Tachycardia present.      Heart sounds: Murmur present.   Pulmonary:      Effort: She is intubated.      Comments: R chest wall is nearly fixed with all respiratory motion in the L lung.  Worse from prior  Abdominal:      General: Bowel sounds are normal.      Tenderness: There is no abdominal tenderness. There is no guarding.   Musculoskeletal:      Right lower leg: Edema present.      Left lower leg: Edema present.      Comments: Generalized anasarca   Skin:     General: Skin is warm and dry.   Neurological:      Comments: Deeply sedated   Psychiatric:      Comments: Unable to assess         Medications  Current Facility-Administered Medications   Medication Dose Route Frequency Provider Last Rate Last Admin   • potassium chloride (KCL) ivpb 10 mEq  10 mEq Intravenous Once Leti Sun M.D. 100 mL/hr at 04/28/21 0537 10 mEq at  04/28/21 0537   • K+ Scale: Goal of 4.5  1 Each Intravenous Q6HRS Benoit Loya Jr., D.O.   1 Each at 04/28/21 0600   • enoxaparin (LOVENOX) inj 40 mg  40 mg Subcutaneous DAILY Benoit Loya Jr., D.O.   40 mg at 04/27/21 0505   • ceFEPIme (MAXIPIME) 1 g in  mL IVPB  1 g Intravenous Q8HRS Marlon Cox D.O.   Stopped at 04/28/21 0525   • propofol (DIPRIVAN) injection  0-80 mcg/kg/min Intravenous Continuous Viraj Burgos M.D. 29.6 mL/hr at 04/28/21 0500 60 mcg/kg/min at 04/28/21 0500   • fentaNYL (SUBLIMAZE) 50 mcg/mL in 50mL   Intravenous Continuous Viraj Burgos M.D. 4 mL/hr at 04/28/21 0215 2,500 mcg at 04/28/21 0215   • Pharmacy Consult: Enteral tube insertion - review meds/change route/product selection  1 Each Other PHARMACY TO DOSE Elliott Salvador M.D.       • acetaminophen (Tylenol) tablet 650 mg  650 mg Enteral Tube Q4HRS PRN Elliott Salvador M.D.   650 mg at 04/28/21 0232   • magnesium hydroxide (MILK OF MAGNESIA) suspension 30 mL  30 mL Enteral Tube QDAY PRN Elliott Salvador M.D.        And   • senna-docusate (PERICOLACE or SENOKOT S) 8.6-50 MG per tablet 2 tablet  2 tablet Enteral Tube BID Elliott Salvador M.D.   Stopped at 04/23/21 1800    And   • polyethylene glycol/lytes (MIRALAX) PACKET 1 Packet  1 Packet Enteral Tube QDAY PRN Elliott Salvador M.D.        And   • bisacodyl (DULCOLAX) suppository 10 mg  10 mg Rectal QDAY PRN Elliott Salvador M.D.       • divalproex (DEPAKOTE SPRINKLE) capsule 500 mg  500 mg Enteral Tube Q8HRS Elliott Salvador M.D.   500 mg at 04/28/21 0537   • DULoxetine (CYMBALTA) capsule 60 mg  60 mg Enteral Tube BID Elliott Salvador M.D.   60 mg at 04/28/21 0538   • PARoxetine (PAXIL) tablet 10 mg  10 mg Enteral Tube QAM Elliott Salvador M.D.   10 mg at 04/28/21 0538   • risperiDONE (RISPERDAL) tablet 2 mg  2 mg Enteral Tube BID Elliott Salvador M.D.   2 mg at 04/28/21 0538   • Respiratory Therapy Consult   Nebulization Continuous RT Man Wahl M.D.        • famotidine (PEPCID) tablet 20 mg  20 mg Enteral Tube Q12HRS Man Wahl M.D.   20 mg at 04/27/21 1749    Or   • famotidine (PEPCID) injection 20 mg  20 mg Intravenous Q12HRS Man Wahl M.D.   20 mg at 04/28/21 0538   • MD Alert...ICU Electrolyte Replacement per Pharmacy   Other PHARMACY TO DOSE Man Wahl M.D.       • lidocaine (XYLOCAINE) 1 % injection 1-2 mL  1-2 mL Tracheal Tube Q30 MIN PRN Man Wahl M.D.       • ipratropium-albuterol (DUONEB) nebulizer solution  3 mL Nebulization Q2HRS PRN (RT) Man Wahl M.D.       • fentaNYL (SUBLIMAZE) injection 25 mcg  25 mcg Intravenous Q HOUR PRN Man Wahl M.D.        Or   • fentaNYL (SUBLIMAZE) injection 50 mcg  50 mcg Intravenous Q HOUR PRN Man Wahl M.D.        Or   • fentaNYL (SUBLIMAZE) injection 100 mcg  100 mcg Intravenous Q HOUR PRN Man Wahl M.D.   100 mcg at 04/28/21 0238       Fluids    Intake/Output Summary (Last 24 hours) at 4/28/2021 0625  Last data filed at 4/28/2021 0600  Gross per 24 hour   Intake 3056.91 ml   Output 2405 ml   Net 651.91 ml       Laboratory  Recent Labs     04/26/21  0419 04/27/21  0426 04/27/21  1142   ISTATAPH 7.501* 7.542* 7.489   ISTATAPCO2 31.3 28.8 33.2   ISTATAPO2 54* 58* 42*   ISTATATCO2 25 26 26   SNKTBEJ1GGW 91* 93 82*   ISTATARTHCO3 24.5 24.8 25.2*   ISTATARTBE 1 2 2   ISTATTEMP 37.8 C 38.2 C 38.0 C   ISTATFIO2 30 30 30   ISTATSPEC Arterial Arterial Arterial   ISTATAPHTC 7.489 7.524* 7.474   CBQUCOVJ0YT 57* 63* 45*         Recent Labs     04/26/21  0515 04/26/21  1500 04/27/21  0210 04/27/21  0500 04/27/21  1752 04/27/21  3052 04/28/21  7107   SODIUM 130*  --  129*  --   --   --  131*   POTASSIUM 3.8   < > 3.7   < > 4.4 3.8 3.9   CHLORIDE 100  --  98  --   --   --  99   CO2 25  --  24  --   --   --  24   BUN 12  --  14  --   --   --  11   CREATININE 0.26*  --  0.27*  --   --   --  0.19*   MAGNESIUM 1.7  --  1.8  --   --   --  1.8   PHOSPHORUS 2.5  --   --    --   --   --   --    CALCIUM 8.0*  --  8.1*  --   --   --  8.2*    < > = values in this interval not displayed.     Recent Labs     04/25/21 1812 04/26/21 0515 04/26/21 1500 04/27/21 0210 04/28/21 0427   ALTSGPT  --  13  --   --   --    ASTSGOT  --  27  --   --   --    ALKPHOSPHAT  --  323*  --   --   --    TBILIRUBIN  --  1.2  --   --   --    GAMMAGT 222*  --   --   --   --    PREALBUMIN  --   --  11.7*  --   --    GLUCOSE  --  109*  --  101* 91     Recent Labs     04/26/21 0515 04/27/21 0210 04/28/21 0427   WBC 13.5* 15.2* 27.7*   NEUTSPOLYS 82.10* 79.00*  --    LYMPHOCYTES 6.70* 7.20*  --    MONOCYTES 9.00 10.20  --    EOSINOPHILS 0.00 0.00  --    BASOPHILS 0.50 0.60  --    ASTSGOT 27  --   --    ALTSGPT 13  --   --    ALKPHOSPHAT 323*  --   --    TBILIRUBIN 1.2  --   --      Recent Labs     04/25/21 1755 04/25/21 1812 04/26/21 0515 04/26/21 0515 04/26/21 1500 04/27/21 0210 04/28/21 0427   RBC  --   --  2.42*  --   --  2.41* 2.18*   HEMOGLOBIN   < >  --  7.4*   < > 7.1* 7.3* 6.6*   HEMATOCRIT  --   --  22.7*  --   --  22.6* 20.9*   PLATELETCT  --   --  502*  --   --  519* 482*   PROTHROMBTM  --  16.0*  --   --   --   --   --    INR  --  1.24*  --   --   --   --   --     < > = values in this interval not displayed.       Imaging  X-Ray:  I have personally reviewed the images and compared with prior images.  CT:    Reviewed  Echo:   Reviewed    Assessment/Plan  * Acute bacterial endocarditis- (present on admission)  Assessment & Plan  MSSA  Cultures cleared 4/17  Infected port removed at [facility]   shunt and spinal stimulator could be seeded, NSGY has consulted on both and don't recommend removal  MV and TV vegetations with septic pulmonary emboli  Extended cefepime  ID consulting  No evidence based surgical indications (valvular CHF, difficult pathogen, etc) so no cardiovascular surgery consult is necessary at this time      Bacteremia due to methicillin susceptible Staphylococcus aureus (MSSA)-  (present on admission)  Assessment & Plan  Source presumed right anterior chest line/port with endocarditis  See discussion of endocarditis above        Empyema of right pleural space (HCC)- (present on admission)  Assessment & Plan  R empyema due to septic pulmonary emboli  Chest tube placement 4/16  At [facility] [place], Upsized 4/18 Dr Benedict, stopped functioning so d/c'ed 4/26  Received TPA/dornase from 4/18-20.  Stopped b/c Hgb dropped requiring transfusion  Hydropneumothorax, very complex pleural space with likely significant trapped lung      Case discussed with Dr. Ganser on 4/27.  Plan for thoracotomy today, 4/28 at 16:30        Acute respiratory failure with hypoxia (HCC)- (present on admission)  Assessment & Plan  Intubated 4/15 for respiratory failure at [facility] [place]  Due to hydro/pneumo, trapped lung, lung abscesses  Lung protective ventilation strategies-decreasing the tidal volume as she is essentially receiving 1 lung ventilation due to her R chest empyema/hydro/pneumo.   Appropriate vent bundles  Does not tolerate SBTs, her RSBI get extremely high immediately        Acute encephalopathy- (present on admission)  Assessment & Plan  Multifactorial including septic and metabolic  Status post lumbar puncture with cytosis, negative Gram stain and cultures NGTD   shunt for pseudotumor cerebri  Not candidate for MRI d/t spinal stimulator  Ct repeat no intracranial abnormalities on 4/21  EEG with encephalopathy and no seizures  Minimize sedation as able  Her problem list includes autoimmune cerebritis.  Need to clarify the history of this, as I don't see anything in her prior d/c summaries about an autoimmune cerebritis    Pleural effusion, left  Assessment & Plan  Loculated fluid  Probably parapneumonic fluid from abscesses  CT guided chest tube placed 4/23, fluid sent  Lymph predominant, NGTD  TPA/dornase one dose on 4/24.  Hold d/t dropping Hgb  CT chest 4/25 with resolution of fluid    Fever  Assessment & Plan  Worsening  fever curse 4/25  BAL from 4/24 with sensitive klebsiella  Blood cultures and UA are unrevealing    Pneumonia  Assessment & Plan  MSSA septic emboli with empyema   BAL 4/20 klebsiella pneum  BAL 4/24 still heavy growth of klebsiella  On cefepime    CSF pleocytosis- (present on admission)  Assessment & Plan  Status post lumbar puncture 4/13  WBC 53, 82% polys, Glucose 47, protein 97 Gram stain negative and culture negative at [facility]  Neurosurgery aware,  shunt in place  MRI brain requested by neurosurgery at [facility], not candidate d/t stimulator  Repeat ct no embolic lesions  cefepime for CNS penetration    Acute blood loss anemia  Assessment & Plan  Hgb dropped again after TPA/dornase on 4/25  No clear source of bleed  D/c TPA/dornase  Hgb stable  Transfuse >7    S/P  shunt- (present on admission)  Assessment & Plan  History of pseudotumor cerebri  History of  shunt by Dr. Oh  Could be seeded by MSSA, Dr. Oh consulted at [facility], recommended MRI but unable given her spinal stimulator  I sent a message to Dr. Oh on 4/27, but he is not on call so may not be available.  She will need a long-term plan for the possibly infected  shunt    Goals of care, counseling/discussion  Assessment & Plan  Palliative is consulting  I spoke with Benoit on 4/27. I explained my concern that even if Enedelia were to survive this her QOL would likely be quite a bit worse.  She won't be able to have another port and was receiving IV meds for HA 3-4 times weekly.  She also could have worse headaches after this episode of meningitis.  He asked if she could have brain damage, and I explained that was possible but hard to know right now.  He says part of him thinks Enedelia would want to keep fighting and part of him thinks she would say to stop aggressive care because her QOL was already very limited by her headaches. Continue aggressive care for now.    Spinal cord stimulator status  Assessment & Plan  Patient's stimulator is Nuvectra. It  is FDA approved as MRI compatible, but the company has gone out of business, so there is no support team to assist with MRI.  Thus, if MRI were performed, our radiologists would need to protocol it correctly to manage the stimulator. The former rep from [employer] [email or web address] Andre, [phone].  Information obtained from Dr. Mahoney's office, [phone].    Thrombocytosis (HCC)  Assessment & Plan  Probably reactive    Anasarca  Assessment & Plan  Mild diffuse peripheral edema  Probably due to critical illness, hypoalbuminemia, prior volume resuscitation  Hold diuretics as I'm concerned she could have mild tension physiology so hypovolemia would be very problematic    Trapped lung  Assessment & Plan  hydropneumo with rind/trapped lung  decort    Pulmonary abscess (HCC)  Assessment & Plan  Septic emboli from TV endocarditis    Anemia  Assessment & Plan  Worsening hgb today  No obvious source of bleed  Transfuse 2U as she will lose some more in the OR today    Septic shock (HCC)- (present on admission)  Assessment & Plan  Shock resolved    Hypokalemia  Assessment & Plan  K-scale    History of vasculitis- (present on admission)  Assessment & Plan  Monitor    Prolonged Q-T interval on ECG- (present on admission)  Assessment & Plan  Avoid QT prolonging agents as able  Optimize electrolytes  Cardiac monitoring  EKG    History of complicated migraines- (present on admission)  Assessment & Plan  Home regimen    Hyponatremia  Assessment & Plan  Mild, monitor    Tachycardia  Assessment & Plan  Sinus  Stable  No PE on CT  Monitor on tele    Abnormal LFTs  Assessment & Plan  Chronic alk phos elevation  GGT high c/w liver source  Outpatient workup    H/O: CVA (cerebrovascular accident)- (present on admission)  Assessment & Plan  Monitor    Chronic pain syndrome- (present on admission)  Assessment & Plan  Chronic back pain and intractable headaches  History of nerve stimulator   Dr Vargas consulted, no plan to remove stimulator due to her  clinical instability     VTE:  lovenox  Ulcer: H2 Antagonist  Lines: Central Line  Ongoing indication addressed and Lopez Catheter  Ongoing indication addressed    I have performed a physical exam and reviewed and updated ROS and Plan today (4/28/2021). In review of yesterday's note (4/27/2021), there are no changes except as documented above.       Discussed patient condition and risk of morbidity and/or mortality with RN, RT, Pharmacy, Dietary, Code status disscussed and Charge nurse / hot rounds.      The patient remains critically ill requiring mechanical ventilation, ongoing management of very complex pleural space bilaterally.  Critical care time = 40 minutes in directly providing and coordinating critical care and extensive data review.  No time overlap and excludes procedures

## 2021-04-28 NOTE — CARE PLAN
Problem: Safety - Medical Restraint  Goal: Remains free of injury from restraints (Restraint for Interference with Medical Device)  Description: INTERVENTIONS:  1. Determine that other, less restrictive measures have been tried or would not be effective before applying the restraint  2. Evaluate the patient's condition at the time of restraint application  3. Inform patient/family regarding the reason for restraint  4. Q2H: Monitor safety, psychosocial status, comfort, nutrition and hydration  Outcome: NOT MET  Flowsheets (Taken 4/25/2021 1324 by Deanna Lacy, R.N.)  Addressed this shift: Remains free of injury from restraints (restraint for interference with medical device):   Determine that other, less restrictive measures have been tried or would not be effective before applying the restraint   Evaluate the patient's condition at the time of restraint application   Inform patient/family regarding the reason for restraint   Every 2 hours: Monitor safety, psychosocial status, comfort, nutrition and hydration

## 2021-04-28 NOTE — ANESTHESIA PREPROCEDURE EVALUATION
Relevant Problems   PULMONARY   (+) History of surgical site infection   (+) Pneumonia      NEURO   (+) H/O: CVA (cerebrovascular accident)   (+) History of surgical site infection   (+) History of vasculitis   (+) Seizures (HCC)      CARDIAC   (+) Arteritis, unspecified (HCC)   (+) History of complicated migraines   (+) Intractable migraine with aura with status migrainosus   (+) Peripheral venous insufficiency      GI   (+) GERD (gastroesophageal reflux disease)      Other   (+) Rheumatoid arthritis(714.0)       Physical Exam    Airway   Mallampati: II  TM distance: >3 FB  Neck ROM: full       Cardiovascular - normal exam  Rhythm: regular  Rate: normal  (-) murmur     Dental - normal exam           Pulmonary - normal exam  Breath sounds clear to auscultation     Abdominal    Neurological - normal exam                 Anesthesia Plan    ASA 4       Plan - general       Airway plan will be ETT          Induction: intravenous    Postoperative Plan: Postoperative administration of opioids is intended.    Pertinent diagnostic labs and testing reviewed    Informed Consent:    Anesthetic plan and risks discussed with patient.    Use of blood products discussed with: patient whom consented to blood products.

## 2021-04-28 NOTE — DISCHARGE PLANNING
Care Transition Team Discharge Planning    Anticipated Discharge Disposition: TBD    Action: Per AM rounds, pt will be sedated until surgery. Pt's pupils are round and reactive. Pts' spouse is the decision maker. Pt has edema of lower extremities. Pt has been NPO since midnight in preparation for surgery. BMS will be placed after surgery. Pt is on vent day 13.    Barriers to Discharge: medical clearance/stability    Plan: Lsw will continue to follow, attend rounds for medical updates, and assist w/ d/c planning.

## 2021-04-28 NOTE — PROGRESS NOTES
Progress Note:    S: o changes    O:  Recent Labs     04/26/21 0515 04/26/21 0515 04/26/21  1500 04/27/21 0210 04/28/21  0427   WBC 13.5*  --   --  15.2* 27.7*   RBC 2.42*  --   --  2.41* 2.18*   HEMOGLOBIN 7.4*   < > 7.1* 7.3* 6.6*   HEMATOCRIT 22.7*  --   --  22.6* 20.9*   MCV 93.8  --   --  93.8 95.9   MCH 30.6  --   --  30.3 30.3   MCHC 32.6*  --   --  32.3* 31.6*   RDW 61.5*  --   --  61.4* 63.1*   PLATELETCT 502*  --   --  519* 482*   MPV 9.0  --   --  9.0 9.3    < > = values in this interval not displayed.     Recent Labs     04/26/21 0515 04/26/21  1500 04/27/21 0210 04/27/21  0500 04/27/21 2322 04/28/21  0427 04/28/21  1215   SODIUM 130*  --  129*  --   --  131*  --    POTASSIUM 3.8   < > 3.7   < > 3.8 3.9 4.3   CHLORIDE 100  --  98  --   --  99  --    CO2 25  --  24  --   --  24  --    GLUCOSE 109*  --  101*  --   --  91  --    BUN 12  --  14  --   --  11  --    CREATININE 0.26*  --  0.27*  --   --  0.19*  --    CALCIUM 8.0*  --  8.1*  --   --  8.2*  --     < > = values in this interval not displayed.     Recent Labs     04/25/21  1812   INR 1.24*     Current Facility-Administered Medications   Medication Dose   • K+ Scale: Goal of 4.5  1 Each   • [Held by provider] enoxaparin (LOVENOX) inj 40 mg  40 mg   • ceFEPIme (MAXIPIME) 1 g in  mL IVPB  1 g   • propofol (DIPRIVAN) injection  0-80 mcg/kg/min   • fentaNYL (SUBLIMAZE) 50 mcg/mL in 50mL     • Pharmacy Consult: Enteral tube insertion - review meds/change route/product selection  1 Each   • acetaminophen (Tylenol) tablet 650 mg  650 mg   • magnesium hydroxide (MILK OF MAGNESIA) suspension 30 mL  30 mL    And   • senna-docusate (PERICOLACE or SENOKOT S) 8.6-50 MG per tablet 2 tablet  2 tablet    And   • polyethylene glycol/lytes (MIRALAX) PACKET 1 Packet  1 Packet    And   • bisacodyl (DULCOLAX) suppository 10 mg  10 mg   • divalproex (DEPAKOTE SPRINKLE) capsule 500 mg  500 mg   • DULoxetine (CYMBALTA) capsule 60 mg  60 mg   • PARoxetine  "(PAXIL) tablet 10 mg  10 mg   • risperiDONE (RISPERDAL) tablet 2 mg  2 mg   • Respiratory Therapy Consult     • famotidine (PEPCID) tablet 20 mg  20 mg    Or   • famotidine (PEPCID) injection 20 mg  20 mg   • MD Alert...ICU Electrolyte Replacement per Pharmacy     • lidocaine (XYLOCAINE) 1 % injection 1-2 mL  1-2 mL   • ipratropium-albuterol (DUONEB) nebulizer solution  3 mL   • fentaNYL (SUBLIMAZE) injection 25 mcg  25 mcg    Or   • fentaNYL (SUBLIMAZE) injection 50 mcg  50 mcg    Or   • fentaNYL (SUBLIMAZE) injection 100 mcg  100 mcg       PE:  /65   Pulse (!) 111   Temp 37.8 °C (100 °F) (Bladder)   Resp (!) 26   Ht 1.6 m (5' 2.99\")   Wt 84 kg (185 lb 3 oz)   SpO2 92%     Intake/Output Summary (Last 24 hours) at 4/28/2021 1544  Last data filed at 4/28/2021 1400  Gross per 24 hour   Intake 2641.85 ml   Output 1630 ml   Net 1011.85 ml       On vent    Rads:  DX-CHEST-PORTABLE (1 VIEW)   Final Result         1.  Pulmonary edema and/or infiltrates, similar to prior study.   2.  Right pneumothorax, stable compared to prior study, interval removal of right thoracostomy tube is noted.   3.  Multiple cavitary appearing left pulmonary lesions, see CT performed yesterday for further characterization   4.  Soft tissue gas in the right chest wall      DX-CHEST-LIMITED (1 VIEW)   Final Result         No significant interval change.      CT-CTA CHEST PULMONARY ARTERY W/ RECONS   Final Result         No CT evidence of pulmonary embolus.      Previous right chest tube were removed.      Grossly similar in size of the loculated right hydropneumothorax but there is increased layering fluid component. Unchanged mild shift of the mediastinal to the left.      Redemonstration of a pigtail catheter in the medial left lung base. Grossly similar multiple cavitary lesions in the left lung.      US-EXTREMITY VENOUS LOWER BILAT   Final Result      POCT BUTTERFLY-DUPLEX SCAN EXTREMITY VEINS LIMITED   Final Result    "   DX-CHEST-PORTABLE (1 VIEW)   Final Result         1.  Pulmonary edema and/or infiltrates, similar to prior study.   2.  Right pneumothorax, somewhat decreased to prior study, interval removal of right thoracostomy tube is noted.   3.  Multiple cavitary appearing left pulmonary lesions, see CT performed yesterday for further characterization   4.  Soft tissue gas in the right chest wall      DX-CHEST-PORTABLE (1 VIEW)   Final Result         1.  Pulmonary edema and/or infiltrates, similar to prior study.   2.  Right pneumothorax, similar to prior study with thoracostomy tube in place.   3.  Multiple cavitary appearing left pulmonary lesions, see CT performed yesterday for further characterization      CT-CHEST (THORAX) W/O   Final Result      Large loculated right hydropneumothorax with interval increase in size of the pneumothorax and pleural fluid.      Right chest tube is in the posterior right thorax.      There is mediastinal shift to the left compatible with tension.      Small pericardial effusion.      Small loculated left pleural effusion with overlying atelectasis/consolidation.   Pigtail catheter is seen at the medial left lung base. Pleural effusion has decreased in size compared to prior.      There are multiple foci of air extending from the right lung to the pleural space suspicious for a bronchopleural fistula.      Multiple cavitary lesions bilaterally with mild interval decrease of the solid component of the cavitary nodules.      Noncavitary 3.1 cm masslike density is seen at the left lung base.      Findings may be related to septic emboli, malignancy or multifocal abscesses. Short interval follow-up recommended.      Soft tissue air on the right is again seen.      Splenomegaly      Findings discussed with Leti Sun.      DX-CHEST-PORTABLE (1 VIEW)   Final Result      No significant change from prior exam.      CT-HEAD W/O   Final Result      1.  RIGHT frontal ventriculostomy catheter  unchanged in position.   2.  Mild cortical atrophy.   3.  No intracranial hemorrhage.   4.  Apparent dural thickening overlying the clivus.  Consider further evaluation with contrast-enhanced MRI.      DX-CHEST-LIMITED (1 VIEW)   Final Result      Interval left basilar pigtail catheter with diminished atelectasis, pleural fluid      Stable right hydropneumothorax with thoracostomy tube in place, considerable soft tissue gas and partial lung collapse      IR-CHEST TUBE-EMPYEMA LEFT   Final Result      1.  CT GUIDED LEFT  10 [place] PIGTAIL CHEST TUBE PLACEMENT FOR POSSIBLE EMPYEMA YIELDING OLD BLOODY FLUID.      2. A CHEST RADIOGRAPH IS FORTHCOMING.      EC-MICAH W/O CONT   Final Result      US-ABDOMEN LTD (SOFT TISSUE)   Final Result      No fluid seen surrounding the electronic implanted spinal stimulator device.      DX-CHEST-LIMITED (1 VIEW)   Final Result      1.  Large right hydropneumothorax with right-sided chest tube in place, likely stable to slightly decreased from prior study.   2.  Small left pleural effusion. Mild improved aeration in the left lung.   3.  Bilateral pulmonary opacities which could be related to combination of atelectasis, edema and/or infection..      CT-CTA HEAD WITH & W/O-POST PROCESS   Final Result      1.  Patent carotid and vertebral arteries.      2.  Again seen right frontal the jugular peritoneal shunt catheter. There is mild increase in volume of the lateral and third ventricles.      CT-CHEST (THORAX) W/O   Final Result      1.  Interval placement of a right-sided chest tube into a large loculated right-sided pleural effusion. There is now seen a large right-sided hydropneumothorax in the area that previously seen fluid. The chest tube extends into that hydropneumothorax.    There is some residual pleural fluid seen as well. A hydropneumothorax is somewhat loculated with components most prominently inferiorly and medially.      2.  New loculated left pleural effusion.      3.   Again seen multiple bilateral cavitary pulmonary masses which are more prominent than previously seen with increasing cavitation and measure up to 3 cm size. Differential diagnosis includes septic emboli, multifocal abscesses and neoplasm.      4.  Large amount of subcutaneous emphysema on the right.      5.  Splenomegaly.      6.  Multiple support devices.      Fleischner Society pulmonary nodule recommendations:         DX-CHEST-LIMITED (1 VIEW)   Final Result      1.  Support devices as described above.      2.  Right chest tube present with a again seen right-sided pneumothorax, possibly increased in size and loculated.      3.  Worsening bilateral pulmonary infiltrates.      DX-ABDOMEN FOR TUBE PLACEMENT   Final Result      Cortrak feeding tube tip projects in the region of the duodenal bulb.      DX-CHEST-LIMITED (1 VIEW)   Final Result      Loculated right pneumothorax is decreased in size compared to prior.      Small left pleural effusion.      Small loculated right pleural effusion.      Extensive bilateral airspace opacities are not significantly changed.      Soft tissue air on the right is again noted.      DX-CHEST-LIMITED (1 VIEW)   Final Result      Decreased partially loculated right pleural fluid with increased pleural air. Right chest tube is in place.      Increased hazy opacity in the left lung possibly atelectasis.         US-ABDOMEN LTD (SOFT TISSUE)   Final Result      No drainable fluid collection.      DX-CHEST-PORTABLE (1 VIEW)   Final Result      Decreased partially loculated right pleural fluid with increased pleural air. Right chest tube is in place.      No other significant change.      DX-CHEST-PORTABLE (1 VIEW)   Final Result         1.  Pulmonary edema and/or infiltrates are identified, which are somewhat decreased since the prior exam.   2.  Moderate loculated appearing right pleural effusion, slightly decreased since prior      DX-ABDOMEN FOR TUBE PLACEMENT   Final Result       Feeding tube tip at the distal stomach.      DX-CHEST-PORTABLE (1 VIEW)   Final Result         1.  New chest tube is noted in the right lower hemithorax.      2.  Right pleural effusion is again identified which appears similar to the prior exam.      3.  No new infiltrates or consolidations are identified.      DX-CHEST-PORTABLE (1 VIEW)   Final Result         1.  Pulmonary edema and/or infiltrates are identified, which are stable since the prior exam.   2.  Moderate loculated appearing right pleural effusion      DX-CHEST-PORTABLE (1 VIEW)   Final Result         No significant interval change.          A:   Active Hospital Problems    Diagnosis    • Acute respiratory failure with hypoxia (HCC) [J96.01]      Priority: High   • Acute bacterial endocarditis [I33.0]      Priority: High   • Empyema of right pleural space (HCC) [J86.9]      Priority: High   • Bacteremia due to methicillin susceptible Staphylococcus aureus (MSSA) [R78.81, B95.61]      Priority: High   • Acute encephalopathy [G93.40]      Priority: High   • Fever [R50.9]      Priority: Medium   • Pleural effusion, left [J90]      Priority: Medium   • Atelectasis [J98.11]      Priority: Medium   • CSF pleocytosis [D72.9]      Priority: Medium   • Pneumonia [J18.9]      Priority: Medium   • Pseudotumor cerebri [G93.2]      Priority: Medium     IMO load March 2020     • S/P  shunt [Z98.2]      Priority: Medium   • Prolonged Q-T interval on ECG [R94.31]      Priority: Low   • History of vasculitis [Z86.79]      Priority: Low   • History of complicated migraines [G43.109]      Priority: Low   • Hyponatremia [E87.1]      Priority: Low   • Tachycardia [R00.0]      Priority: Low   • H/O: CVA (cerebrovascular accident) [Z86.73]      Priority: Low   • Chronic pain syndrome [G89.4]      Priority: Low   • Thrombocytopenia (HCC) [D69.6]      Priority: Low   • Spinal cord stimulator status [Z96.89]    • Goals of care, counseling/discussion [Z71.89]    • Abnormal  movement [G25.9]    • Pulmonary abscess (HCC) [J85.2]    • 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 Patient is very high risk, but seems to have some tension pressing on left lung and heart so risk warranted. Consent obtained by ICU team. I discussed with mother yesterday.    John Ganser M.D.  [facility]

## 2021-04-28 NOTE — CARE PLAN
Problem: Safety - Medical Restraint  Goal: Free from restraint(s) (Restraint for Interference with Medical Device)  Description: INTERVENTIONS:  1. ONCE/SHIFT or MINIMUM Q12H: Assess and document the continuing need for restraints  2. Q24H: Continued use of restraint requires LIP to perform face to face examination and written order  3. Identify and implement measures to help patient regain control  Outcome: NOT MET     Problem: Safety - Medical Restraint  Goal: Remains free of injury from restraints (Restraint for Interference with Medical Device)  Description: INTERVENTIONS:  1. Determine that other, less restrictive measures have been tried or would not be effective before applying the restraint  2. Evaluate the patient's condition at the time of restraint application  3. Inform patient/family regarding the reason for restraint  4. Q2H: Monitor safety, psychosocial status, comfort, nutrition and hydration  Outcome: PROGRESSING AS EXPECTED

## 2021-04-28 NOTE — PROGRESS NOTES
Pt intubated at this time.   Pt brought to pre-op area by RN and RT.   Pt met by anesthesiologist, surgeon, and OR RN.   Unit RN gave report to OR team.   Pt taken directly to OR prior to completion of pre-op assessment.

## 2021-04-28 NOTE — PROGRESS NOTES
Infectious Disease Daily Progress Note    Date of Service  4/28/2021    Chief Complaint  Cefepime 4/23 to present.  PRIOR Rx:   Zosyn 4/22-4/23  Previous: Unasyn, Zosyn, Vanco, Daptomycin  Ceftaroline: start 4/13-4/15  Nafcillin 2g Q4 hrs 4/15-4/23 4/14: Unable to give name of previous NSG or neurologist. Seems confused, but able to say some sentences fluently.   4/15: Line cultures now growing MSSA. As well as from the blood. Repeat blood culture 4/13+ in both sets. Patient has worsening confusion. CSF fluid analyses suggest pleocytosis. Cultures are no growth from the CSF. Chest CT was ordered this morning indicating a loculated right pleural effusion as well as bilateral septic emboli. Dr. Mack spoke with Dr. Oh yesterday and no surgical intervention unless clinical deterioration.  4/16: Increased respiratory distress.  Tachypneic.  CT with loculated collection.  Dr Resendiz not available.  No thoracic surgeon available.  Plans to have pulmonary place CT.  Transferring to ICU.  4/17: Transferred to [facility] last night. Hgb dropped to 6.4 requiring PRBC transfusion. Requiring 2 pressors. Fio2 50%. Febrile 38.8. Pending OR decortication of empyema and hemothorax. Chest tube placed at [facility] shows 8,371 WBC, ,000, 74% N  4/18: Chest tube was upsized by surgery yesterday, no decortication. WBC 22k. Fio2 40%. Vasopressin. Levophed down to 2mcg. Afebrile 24 hrs. Last fever 38.6 on 4/16.   4/19: Still on vent. Levo 3 mcg, vasopressin. Afebrile 72 hours. WBC 21k. BC 4/17 NGTD x 48 hours. Unable to do MRI brain given neurostimulator per RN.   4/20: Remaining afebrile. Hb 6.2 and undergoing transfusion today.WBC 24,100, 5% bands.1700 ml CT output. Copious bloody ET secretions. No pressors. Receiving dornase and TPA per CT. Right pleural effusion not large per CXR today.   4/21: WBC 31k. Bronchoscopy yesterday showing blood. Cultures sent. TPA on hold per RN due to arvind blood from chest tube requiring PRBC  transfusion for hgb 6. Pending chest CT today. Per , spinal stimulator is non-MRI compatible. Levophed 10mcg. 30% Fio2. Afebrile.   4/22: Fever 101.3 this am. WBC 20,300 down from 32,200 yesterday. BAL growing Klebsiella pneumoniae but susceptibility panel not set up until today. CTA of brain shows no lesion. CT of chest shows enlarging cavitary lung lesions bilat and large loculated new left pleural effusion and large hydropneumothorax on right. TPA & dornase infusions of right chest ended 4/20 after considerable bleeding requiring transfusion. Hb now down again to 6.8.  4/23: WBC 23k. Fio2 40%. Tmax 38.1. US of stiumlator shows small fluid collection but no drainable abscess. Pending MICAH today. Pending L chest tube placement  4/24: Continue fever 100.8-101.8. WBC 16,600. MICAH shows large vegetations on both tricuspid valve and mitral valve with mild TR & MR.  No aortic root abscess. Left chest tube placed yesterday yielding 8 ml of old bloody fluid. Bronch today revealed copious thick maroon secretions in distal trachea and both mainstem bronchi. On the right these were obstructive. Remaining off pressors. Last positive blood cultures (MSSA) were 4/16, negative 4/17.  4/25: Fever 100.6 this AM. wBC 13,300. Hb 6.6. CT: right hydropneumothorax increasing, right bronchopleural fistula, mediastinal shift ot the left , multiple cavitary pulmonary nodules, 3.1 cm left basilar mass, splenomegaly. CT of head shows dural thickening over the clivus. Lac+ GNR >100,000 col/ml growing from BAL of 4/24, presumed Klebsiella. Left peural fluid culture negative from 4/23.  4/26: Fever 100.4 last night. WBC 13,500. Hb 7.4. Plts 502,000. ESR >120. UA fairly clear except 30 mg% protein, 2-5 wbc and rare rbc. CXR unchanged except more prominent pulmonary edema. GNR in BAL may be a different species of Klebsiella, and resistant to ampicillin & tmp-sulfa; confirmation pending.  4/28: Fever 100.8 last night. WBC 27,700. Hb 6.6.  Plts 482,000. Creat 0.19. Kleb pneum in BAL on 4/24 is resistant to ampicillin & tmp-sulfa but otherwise sensitive. O2 sat 96% on FIO2 30% now, PEEP 8. Has been re-evaluated by thoracic surgeon, Dr. Ganser, who believes she will not wean without decortication on the right. Surgery anticipated today.  Review of Systems  Review of Systems   Unable to perform ROS: Intubated      Physical Exam  Temp:  [37.5 °C (99.5 °F)-37.7 °C (99.9 °F)] 37.6 °C (99.7 °F)  Pulse:  [] 112  Resp:  [16-34] 17  BP: ()/(45-64) 111/53  SpO2:  [68 %-100 %] 95 % On FIO2 35%, PEEP 5.     Physical Exam  Constitutional:       Comments: Intubated sedated . Exam unchanged  HENT:      Head: Normocephalic. Gaze conjugate. No chemosis or conjunctival injection.  Neck supple.     Comments: ET tube. Oral mucosae moist.   Cardiovascular:      Rate and Rhythm: Normal rate and regular rhythm.      Heart sounds: No murmur.   Pulmonary: O2 sat 93% on FIO2 30%, PEEP 8. AC 18, RR 31.     Comments: Diminished R side with rales. R chest tube with scant blood. Left chest tube.  Abdominal:      General: Abdomen is flat. Bowel sounds are normal. There is no distension or apparent tenderness.   Musculoskeletal:         General: 1+ pedal edema. Embolic lesions on right foot.  Skin:     Comments: Bilat chest tubes. Stimulator palpable, non indurated. No rash.    Laboratory  Recent Labs     04/26/21  0515 04/26/21  0515 04/26/21  1500 04/27/21  0210 04/28/21  0427   WBC 13.5*  --   --  15.2* 27.7*   RBC 2.42*  --   --  2.41* 2.18*   HEMOGLOBIN 7.4*   < > 7.1* 7.3* 6.6*   HEMATOCRIT 22.7*  --   --  22.6* 20.9*   MCV 93.8  --   --  93.8 95.9   MCH 30.6  --   --  30.3 30.3   MCHC 32.6*  --   --  32.3* 31.6*   RDW 61.5*  --   --  61.4* 63.1*   PLATELETCT 502*  --   --  519* 482*   MPV 9.0  --   --  9.0 9.3    < > = values in this interval not displayed.     Recent Labs     04/26/21  0515 04/26/21  1500 04/27/21  0210 04/27/21  0500 04/27/21  1752 04/27/21  2292  04/28/21  0427   SODIUM 130*  --  129*  --   --   --  131*   POTASSIUM 3.8   < > 3.7   < > 4.4 3.8 3.9   CHLORIDE 100  --  98  --   --   --  99   CO2 25  --  24  --   --   --  24   GLUCOSE 109*  --  101*  --   --   --  91   BUN 12  --  14  --   --   --  11   CREATININE 0.26*  --  0.27*  --   --   --  0.19*   CALCIUM 8.0*  --  8.1*  --   --   --  8.2*    < > = values in this interval not displayed.     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       - Acute left empyema s/p chest tube placement 4/23  -Multiple acute septic pulmonary emboli bilaterally  -Acute bilateral pneumonia due to above     --Pulmonary edema  -Pseudotumor cerebri with underlying  shunt: possible arachnoiditis  -Chronic migraine headaches  -History of autoimmune vasculitis  -Elevated alkaline phosphatase & bilirubin  -Acute encephalopathy due to sepsis      - anemia due to above      - New secondary Klebsiella pneumoniae pneumonia (presumed VAP)     Plan   Patient has evidence of disseminated staphylococcal sepsis source from her line with infection to her lungs, tricuspid & mitral valves and  bilat empyema requiring transfer to [facility] for decortication due to lack of CT surgery support at \[facility]".  However not a surgical candidate after all. Now with bilateral cavitary lung abscess and empyema. Sputum cultures now growing additional Kleb. May be a candidate for decortication in the future if she stabilizes, but thoracic surgery service has signed off for now.  Persistent fever likely attributable to empyema and multiple lung abscesses.   Consider MRI of brain when clinically feasible. Spinal stimulator may prevent MRI.   - Pt needs CNS  coverage for initial MSSA infection, so now on Cefepime 1gm Q8hrs  - Cefepime covers new Klebsiella sp in BAL so no change today.   - US of spinal stimulator shows ill defined fluid collection, but undrainable. NSGY does not recommend stiumulator removal due to instability  - central line exchanged 4/22  - initial CSF cultures has pleocytosis, but no culture growth  - chest tube upsized by surgery 4/17.   - blood culture from 4/14 at [facility] positive. Admission blood cultures here on 4/16 both positive.  4/17 blood cultures NGTD  - Central line now has been changed 4/22  - CSF findings on 4/13:  63 wbc, 82% PMN, protein 97.  - original infected port removed 4/12  - vent management per pulmonology      Condition remains critically ill.      COMMENT: ALthough high risk, decortication may at last render her afebrile and lower her WBC, as well as facilitate weaning from the ventilator.  CULTURES AT SURGERY WILL BE VERY HELPFUL.   45 min spent with 50% face to face care.  Thank you for this consult.

## 2021-04-28 NOTE — CARE PLAN
Problem: Safety  Goal: Will remain free from injury  Outcome: PROGRESSING AS EXPECTED  Fall precautions in place. Bed in lowest position. Non-skid socks in place. Personal possessions within reach. Mobility sign on door. Bed-alarm on.     Problem: Safety - Medical Restraint  Goal: Remains free of injury from restraints (Restraint for Interference with Medical Device)  Description: INTERVENTIONS:  1. Determine that other, less restrictive measures have been tried or would not be effective before applying the restraint  2. Evaluate the patient's condition at the time of restraint application  3. Inform patient/family regarding the reason for restraint  4. Q2H: Monitor safety, psychosocial status, comfort, nutrition and hydration  Outcome: PROGRESSING AS EXPECTED  Pt is in bilat soft wrist restraints. Order verified and documentation in place.

## 2021-04-28 NOTE — PROGRESS NOTES
Patient transferred to pre-op with ACLS RN, RT, and transport. Care handed off to anesthesiologist and Dr. Ganser

## 2021-04-29 ENCOUNTER — APPOINTMENT (OUTPATIENT)
Dept: RADIOLOGY | Facility: MEDICAL CENTER | Age: 51
DRG: 003 | End: 2021-04-29
Attending: INTERNAL MEDICINE
Payer: MEDICARE

## 2021-04-29 LAB
ANION GAP SERPL CALC-SCNC: 7 MMOL/L (ref 7–16)
ANISOCYTOSIS BLD QL SMEAR: ABNORMAL
BASE EXCESS BLDA CALC-SCNC: 1 MMOL/L (ref -4–3)
BASOPHILS # BLD AUTO: 0 % (ref 0–1.8)
BASOPHILS # BLD: 0 K/UL (ref 0–0.12)
BODY TEMPERATURE: ABNORMAL DEGREES
BREATHS SETTING VENT: 24
BUN SERPL-MCNC: 10 MG/DL (ref 8–22)
CALCIUM SERPL-MCNC: 7.8 MG/DL (ref 8.5–10.5)
CHLORIDE SERPL-SCNC: 102 MMOL/L (ref 96–112)
CO2 BLDA-SCNC: 27 MMOL/L (ref 20–33)
CO2 SERPL-SCNC: 24 MMOL/L (ref 20–33)
CREAT SERPL-MCNC: <0.17 MG/DL (ref 0.5–1.4)
DELSYS IDSYS: ABNORMAL
END TIDAL CARBON DIOXIDE IECO2: 34 MMHG
EOSINOPHIL # BLD AUTO: 0 K/UL (ref 0–0.51)
EOSINOPHIL NFR BLD: 0 % (ref 0–6.9)
ERYTHROCYTE [DISTWIDTH] IN BLOOD BY AUTOMATED COUNT: 58.4 FL (ref 35.9–50)
GLUCOSE SERPL-MCNC: 94 MG/DL (ref 65–99)
GRAM STN SPEC: NORMAL
HCO3 BLDA-SCNC: 25.5 MMOL/L (ref 17–25)
HCT VFR BLD AUTO: 24.1 % (ref 37–47)
HGB BLD-MCNC: 8 G/DL (ref 12–16)
HOROWITZ INDEX BLDA+IHG-RTO: 278 MM[HG]
LYMPHOCYTES # BLD AUTO: 0.58 K/UL (ref 1–4.8)
LYMPHOCYTES NFR BLD: 2.7 % (ref 22–41)
MACROCYTES BLD QL SMEAR: ABNORMAL
MANUAL DIFF BLD: NORMAL
MCH RBC QN AUTO: 30.4 PG (ref 27–33)
MCHC RBC AUTO-ENTMCNC: 33.2 G/DL (ref 33.6–35)
MCV RBC AUTO: 91.6 FL (ref 81.4–97.8)
MODE IMODE: ABNORMAL
MONOCYTES # BLD AUTO: 0.75 K/UL (ref 0–0.85)
MONOCYTES NFR BLD AUTO: 3.5 % (ref 0–13.4)
MORPHOLOGY BLD-IMP: NORMAL
MYELOCYTES NFR BLD MANUAL: 0.9 %
NEUTROPHILS # BLD AUTO: 19.79 K/UL (ref 2–7.15)
NEUTROPHILS NFR BLD: 92.9 % (ref 44–72)
NRBC # BLD AUTO: 0 K/UL
NRBC BLD-RTO: 0 /100 WBC
O2/TOTAL GAS SETTING VFR VENT: 40 %
PCO2 BLDA: 41.5 MMHG (ref 26–37)
PCO2 TEMP ADJ BLDA: 41.5 MMHG (ref 26–37)
PEEP END EXPIRATORY PRESSURE IPEEP: 8 CMH20
PH BLDA: 7.4 [PH] (ref 7.4–7.5)
PH TEMP ADJ BLDA: 7.4 [PH] (ref 7.4–7.5)
PLATELET # BLD AUTO: 402 K/UL (ref 164–446)
PLATELET BLD QL SMEAR: NORMAL
PMV BLD AUTO: 9 FL (ref 9–12.9)
PO2 BLDA: 111 MMHG (ref 64–87)
PO2 TEMP ADJ BLDA: 111 MMHG (ref 64–87)
POLYCHROMASIA BLD QL SMEAR: NORMAL
POTASSIUM SERPL-SCNC: 3.8 MMOL/L (ref 3.6–5.5)
POTASSIUM SERPL-SCNC: 4.2 MMOL/L (ref 3.6–5.5)
POTASSIUM SERPL-SCNC: 4.2 MMOL/L (ref 3.6–5.5)
POTASSIUM SERPL-SCNC: 4.3 MMOL/L (ref 3.6–5.5)
RBC # BLD AUTO: 2.63 M/UL (ref 4.2–5.4)
RBC BLD AUTO: PRESENT
SAO2 % BLDA: 98 % (ref 93–99)
SIGNIFICANT IND 70042: NORMAL
SITE SITE: NORMAL
SODIUM SERPL-SCNC: 133 MMOL/L (ref 135–145)
SOURCE SOURCE: NORMAL
SPECIMEN DRAWN FROM PATIENT: ABNORMAL
TIDAL VOLUME IVT: 250 ML
WBC # BLD AUTO: 21.3 K/UL (ref 4.8–10.8)

## 2021-04-29 PROCEDURE — 0BC58ZZ EXTIRPATION OF MATTER FROM RIGHT MIDDLE LOBE BRONCHUS, VIA NATURAL OR ARTIFICIAL OPENING ENDOSCOPIC: ICD-10-PCS | Performed by: INTERNAL MEDICINE

## 2021-04-29 PROCEDURE — 700111 HCHG RX REV CODE 636 W/ 250 OVERRIDE (IP): Performed by: INTERNAL MEDICINE

## 2021-04-29 PROCEDURE — 94799 UNLISTED PULMONARY SVC/PX: CPT

## 2021-04-29 PROCEDURE — 99291 CRITICAL CARE FIRST HOUR: CPT | Performed by: INTERNAL MEDICINE

## 2021-04-29 PROCEDURE — 770022 HCHG ROOM/CARE - ICU (200)

## 2021-04-29 PROCEDURE — 700102 HCHG RX REV CODE 250 W/ 637 OVERRIDE(OP): Performed by: INTERNAL MEDICINE

## 2021-04-29 PROCEDURE — 84132 ASSAY OF SERUM POTASSIUM: CPT | Mod: 91

## 2021-04-29 PROCEDURE — 0BC68ZZ EXTIRPATION OF MATTER FROM RIGHT LOWER LOBE BRONCHUS, VIA NATURAL OR ARTIFICIAL OPENING ENDOSCOPIC: ICD-10-PCS | Performed by: INTERNAL MEDICINE

## 2021-04-29 PROCEDURE — 82803 BLOOD GASES ANY COMBINATION: CPT

## 2021-04-29 PROCEDURE — 85007 BL SMEAR W/DIFF WBC COUNT: CPT

## 2021-04-29 PROCEDURE — 700101 HCHG RX REV CODE 250: Performed by: INTERNAL MEDICINE

## 2021-04-29 PROCEDURE — 80048 BASIC METABOLIC PNL TOTAL CA: CPT

## 2021-04-29 PROCEDURE — 94003 VENT MGMT INPAT SUBQ DAY: CPT

## 2021-04-29 PROCEDURE — 31645 BRNCHSC W/THER ASPIR 1ST: CPT | Performed by: INTERNAL MEDICINE

## 2021-04-29 PROCEDURE — 302977 HCHG BRONCHOSCOPY PROC-THERAPEUTIC

## 2021-04-29 PROCEDURE — 700105 HCHG RX REV CODE 258: Performed by: INTERNAL MEDICINE

## 2021-04-29 PROCEDURE — 85027 COMPLETE CBC AUTOMATED: CPT

## 2021-04-29 PROCEDURE — 71045 X-RAY EXAM CHEST 1 VIEW: CPT

## 2021-04-29 PROCEDURE — A9270 NON-COVERED ITEM OR SERVICE: HCPCS | Performed by: INTERNAL MEDICINE

## 2021-04-29 RX ORDER — NOREPINEPHRINE BITARTRATE 0.03 MG/ML
0-30 INJECTION, SOLUTION INTRAVENOUS CONTINUOUS
Status: DISCONTINUED | OUTPATIENT
Start: 2021-04-29 | End: 2021-05-02

## 2021-04-29 RX ORDER — LIDOCAINE HYDROCHLORIDE 10 MG/ML
INJECTION, SOLUTION INFILTRATION; PERINEURAL
Status: ACTIVE
Start: 2021-04-29 | End: 2021-04-30

## 2021-04-29 RX ORDER — DEXMEDETOMIDINE HYDROCHLORIDE 4 UG/ML
.1-1.5 INJECTION, SOLUTION INTRAVENOUS CONTINUOUS
Status: DISCONTINUED | OUTPATIENT
Start: 2021-04-29 | End: 2021-04-30

## 2021-04-29 RX ORDER — POTASSIUM CHLORIDE 7.45 MG/ML
10 INJECTION INTRAVENOUS ONCE
Status: COMPLETED | OUTPATIENT
Start: 2021-04-29 | End: 2021-04-29

## 2021-04-29 RX ORDER — FUROSEMIDE 10 MG/ML
20 INJECTION INTRAMUSCULAR; INTRAVENOUS
Status: DISCONTINUED | OUTPATIENT
Start: 2021-04-29 | End: 2021-05-01

## 2021-04-29 RX ORDER — DEXMEDETOMIDINE HYDROCHLORIDE 4 UG/ML
.1-1.5 INJECTION, SOLUTION INTRAVENOUS CONTINUOUS
Status: DISCONTINUED | OUTPATIENT
Start: 2021-04-29 | End: 2021-04-29

## 2021-04-29 RX ADMIN — DEXMEDETOMIDINE 1.5 MCG/KG/HR: 200 INJECTION, SOLUTION INTRAVENOUS at 16:05

## 2021-04-29 RX ADMIN — PROPOFOL 60 MCG/KG/MIN: 10 INJECTION, EMULSION INTRAVENOUS at 01:27

## 2021-04-29 RX ADMIN — FENTANYL CITRATE 100 MCG: 50 INJECTION, SOLUTION INTRAMUSCULAR; INTRAVENOUS at 19:24

## 2021-04-29 RX ADMIN — DOCUSATE SODIUM 50 MG AND SENNOSIDES 8.6 MG 2 TABLET: 8.6; 5 TABLET, FILM COATED ORAL at 17:19

## 2021-04-29 RX ADMIN — ENOXAPARIN SODIUM 40 MG: 40 INJECTION SUBCUTANEOUS at 07:45

## 2021-04-29 RX ADMIN — PROPOFOL 60 MCG/KG/MIN: 10 INJECTION, EMULSION INTRAVENOUS at 04:54

## 2021-04-29 RX ADMIN — DULOXETINE HYDROCHLORIDE 60 MG: 60 CAPSULE, DELAYED RELEASE ORAL at 17:19

## 2021-04-29 RX ADMIN — DEXMEDETOMIDINE 0.2 MCG/KG/HR: 200 INJECTION, SOLUTION INTRAVENOUS at 07:48

## 2021-04-29 RX ADMIN — FAMOTIDINE 20 MG: 10 INJECTION INTRAVENOUS at 05:06

## 2021-04-29 RX ADMIN — RISPERIDONE 2 MG: 1 TABLET, FILM COATED ORAL at 05:06

## 2021-04-29 RX ADMIN — ACETAMINOPHEN 650 MG: 325 TABLET, FILM COATED ORAL at 19:33

## 2021-04-29 RX ADMIN — Medication 200 MCG/HR: at 20:22

## 2021-04-29 RX ADMIN — CEFEPIME 1 G: 1 INJECTION, POWDER, FOR SOLUTION INTRAMUSCULAR; INTRAVENOUS at 05:25

## 2021-04-29 RX ADMIN — DULOXETINE HYDROCHLORIDE 60 MG: 60 CAPSULE, DELAYED RELEASE ORAL at 05:06

## 2021-04-29 RX ADMIN — FENTANYL CITRATE 100 MCG: 50 INJECTION, SOLUTION INTRAMUSCULAR; INTRAVENOUS at 09:35

## 2021-04-29 RX ADMIN — DOCUSATE SODIUM 50 MG AND SENNOSIDES 8.6 MG 2 TABLET: 8.6; 5 TABLET, FILM COATED ORAL at 05:05

## 2021-04-29 RX ADMIN — NOREPINEPHRINE BITARTRATE 10 MCG/MIN: 1 INJECTION, SOLUTION, CONCENTRATE INTRAVENOUS at 21:03

## 2021-04-29 RX ADMIN — FUROSEMIDE 20 MG: 10 INJECTION, SOLUTION INTRAMUSCULAR; INTRAVENOUS at 13:51

## 2021-04-29 RX ADMIN — DIVALPROEX SODIUM 500 MG: 125 CAPSULE ORAL at 14:02

## 2021-04-29 RX ADMIN — CEFEPIME 1 G: 1 INJECTION, POWDER, FOR SOLUTION INTRAMUSCULAR; INTRAVENOUS at 22:44

## 2021-04-29 RX ADMIN — POTASSIUM CHLORIDE 10 MEQ: 7.46 INJECTION, SOLUTION INTRAVENOUS at 18:47

## 2021-04-29 RX ADMIN — DIVALPROEX SODIUM 500 MG: 125 CAPSULE ORAL at 05:05

## 2021-04-29 RX ADMIN — Medication 2500 MCG: at 01:25

## 2021-04-29 RX ADMIN — DEXMEDETOMIDINE 1.5 MCG/KG/HR: 200 INJECTION, SOLUTION INTRAVENOUS at 23:40

## 2021-04-29 RX ADMIN — DIVALPROEX SODIUM 500 MG: 125 CAPSULE ORAL at 22:45

## 2021-04-29 RX ADMIN — FENTANYL CITRATE 50 MCG: 50 INJECTION, SOLUTION INTRAMUSCULAR; INTRAVENOUS at 09:02

## 2021-04-29 RX ADMIN — POTASSIUM CHLORIDE 10 MEQ: 7.46 INJECTION, SOLUTION INTRAVENOUS at 14:02

## 2021-04-29 RX ADMIN — FAMOTIDINE 20 MG: 10 INJECTION INTRAVENOUS at 17:18

## 2021-04-29 RX ADMIN — CEFEPIME 1 G: 1 INJECTION, POWDER, FOR SOLUTION INTRAMUSCULAR; INTRAVENOUS at 14:02

## 2021-04-29 RX ADMIN — Medication 400 MCG/HR: at 10:51

## 2021-04-29 RX ADMIN — POTASSIUM CHLORIDE 10 MEQ: 7.46 INJECTION, SOLUTION INTRAVENOUS at 06:02

## 2021-04-29 RX ADMIN — RISPERIDONE 2 MG: 1 TABLET, FILM COATED ORAL at 17:19

## 2021-04-29 RX ADMIN — PAROXETINE HYDROCHLORIDE 10 MG: 20 TABLET, FILM COATED ORAL at 05:06

## 2021-04-29 RX ADMIN — DEXMEDETOMIDINE 1.5 MCG/KG/HR: 200 INJECTION, SOLUTION INTRAVENOUS at 19:51

## 2021-04-29 ASSESSMENT — PAIN DESCRIPTION - PAIN TYPE
TYPE: ACUTE PAIN

## 2021-04-29 ASSESSMENT — FIBROSIS 4 INDEX: FIB4 SCORE: 0.75

## 2021-04-29 NOTE — ANESTHESIA POSTPROCEDURE EVALUATION
Patient: Enedelia Pang    Procedure Summary     Date: 04/28/21 Room / Location: [facility] 05 / SURGERY [facility]    Anesthesia Start: 1607 Anesthesia Stop: 1758    Procedures:       THORACOSCOPY (Right Chest)      DECORTICATION, LUNG (Right Chest) Diagnosis: (Right loculated hydropneumothorax, empyema)    Surgeons: John H Ganser, M.D. Responsible Provider: Beth Garcia M.D.    Anesthesia Type: general ASA Status: 4          Final Anesthesia Type: general  Last vitals  BP   Blood Pressure: 129/65    Temp   37.8 °C (100 °F)    Pulse   (!) 111   Resp   (!) 26    SpO2   92 %      Anesthesia Post Evaluation    Patient location during evaluation: ICU  Patient participation: complete - patient cannot participate  Level of consciousness: obtunded/minimal responses  Pain score: 0    Airway patency: patent  Anesthetic complications: no  Cardiovascular status: hemodynamically stable  Respiratory status: acceptable  Hydration status: euvolemic    PONV: none  patient was unable to participate        No complications documented.     Nurse Pain Score: 8 (NPRS)

## 2021-04-29 NOTE — ANESTHESIA PROCEDURE NOTES
Arterial Line  Performed by: John H Ganser, M.D.  Authorized by: Beth Garcia M.D.     Start Time:  4/28/2021 4:12 PM  End Time:  4/28/2021 4:16 PM  Localization: ultrasound guidance and surface landmarks  Image captured, interpreted and electronically stored.  Patient Location:  OR  Indication: continuous blood pressure monitoring and blood sampling needed        Catheter Size:  20 G  Seldinger Technique?: Yes    Laterality:  Left  Site:  Radial artery  Line Secured:  Antimicrobial disc, tape and transparent dressing  Events: patient tolerated procedure well with no complications

## 2021-04-29 NOTE — CARE PLAN
Ventilator Daily Summary     Vent Day #  14  Ventilator settings changed this shift: RR increased to 24  Weaning trials:  No  Respiratory Procedures:  No  Plan: Continue current ventilator settings and wean mechanical ventilation as tolerated per physician orders.  [phone]

## 2021-04-29 NOTE — PROGRESS NOTES
Progress Note:    S: Stable overnight    O:  Recent Labs     04/27/21  0210 04/27/21  0210 04/28/21  0427 04/28/21  1855 04/29/21  0355   WBC 15.2*  --  27.7*  --  21.3*   RBC 2.41*  --  2.18*  --  2.63*   HEMOGLOBIN 7.3*   < > 6.6* 8.8* 8.0*   HEMATOCRIT 22.6*  --  20.9*  --  24.1*   MCV 93.8  --  95.9  --  91.6   MCH 30.3  --  30.3  --  30.4   MCHC 32.3*  --  31.6*  --  33.2*   RDW 61.4*  --  63.1*  --  58.4*   PLATELETCT 519*  --  482*  --  402   MPV 9.0  --  9.3  --  9.0    < > = values in this interval not displayed.     Recent Labs     04/27/21 0210 04/27/21  0500 04/28/21  0427 04/28/21  1215 04/28/21  1855 04/28/21  2330 04/29/21  0355   SODIUM 129*  --  131*  --   --   --  133*   POTASSIUM 3.7   < > 3.9   < > 4.1 4.3 4.2   CHLORIDE 98  --  99  --   --   --  102   CO2 24  --  24  --   --   --  24   GLUCOSE 101*  --  91  --   --   --  94   BUN 14  --  11  --   --   --  10   CREATININE 0.27*  --  0.19*  --   --   --  <0.17*   CALCIUM 8.1*  --  8.2*  --   --   --  7.8*    < > = values in this interval not displayed.         Current Facility-Administered Medications   Medication Dose   • dexmedetomidine (PRECEDEX) 400 mcg/100mL NS premix infusion  0.1-1.5 mcg/kg/hr   • K+ Scale: Goal of 4.5  1 Each   • enoxaparin (LOVENOX) inj 40 mg  40 mg   • ceFEPIme (MAXIPIME) 1 g in  mL IVPB  1 g   • fentaNYL (SUBLIMAZE) 50 mcg/mL in 50mL     • Pharmacy Consult: Enteral tube insertion - review meds/change route/product selection  1 Each   • acetaminophen (Tylenol) tablet 650 mg  650 mg   • magnesium hydroxide (MILK OF MAGNESIA) suspension 30 mL  30 mL    And   • senna-docusate (PERICOLACE or SENOKOT S) 8.6-50 MG per tablet 2 tablet  2 tablet    And   • polyethylene glycol/lytes (MIRALAX) PACKET 1 Packet  1 Packet    And   • bisacodyl (DULCOLAX) suppository 10 mg  10 mg   • divalproex (DEPAKOTE SPRINKLE) capsule 500 mg  500 mg   • DULoxetine (CYMBALTA) capsule 60 mg  60 mg   • PARoxetine (PAXIL) tablet 10 mg  10 mg   •  "risperiDONE (RISPERDAL) tablet 2 mg  2 mg   • Respiratory Therapy Consult     • famotidine (PEPCID) tablet 20 mg  20 mg    Or   • famotidine (PEPCID) injection 20 mg  20 mg   • MD Alert...ICU Electrolyte Replacement per Pharmacy     • lidocaine (XYLOCAINE) 1 % injection 1-2 mL  1-2 mL   • ipratropium-albuterol (DUONEB) nebulizer solution  3 mL   • fentaNYL (SUBLIMAZE) injection 25 mcg  25 mcg    Or   • fentaNYL (SUBLIMAZE) injection 50 mcg  50 mcg    Or   • fentaNYL (SUBLIMAZE) injection 100 mcg  100 mcg       PE:  /58   Pulse (!) 110   Temp 36.9 °C (98.4 °F) (Bladder)   Resp (!) 27   Ht 1.6 m (5' 2.99\")   Wt 82.4 kg (181 lb 10.5 oz)   SpO2 94%     Intake/Output Summary (Last 24 hours) at 4/29/2021 1258  Last data filed at 4/29/2021 1200  Gross per 24 hour   Intake 2140.88 ml   Output 2580 ml   Net -439.12 ml       Chest tubes: No air leak, dark old fluid, serosanguinous    Rads:  DX-CHEST-PORTABLE (1 VIEW)   Final Result         1.  Pulmonary edema and/or infiltrates, similar to prior study.   2.  Right pneumothorax, stable compared to prior study, right thoracostomy tubes remain in place   3.  Multiple cavitary appearing left pulmonary lesions, see CT performed April 27, 2021 for further characterization   4.  Soft tissue gas in the right chest wall      DX-CHEST-PORTABLE (1 VIEW)   Final Result         1.  Pulmonary edema and/or infiltrates, similar to prior study.   2.  Right pneumothorax, interval decreased compared to prior study, interval placement of right thoracostomy tubes is noted.   3.  Multiple cavitary appearing left pulmonary lesions, see CT performed yesterday for further characterization   4.  Soft tissue gas in the right chest wall      DX-CHEST-PORTABLE (1 VIEW)   Final Result         1.  Pulmonary edema and/or infiltrates, similar to prior study.   2.  Right pneumothorax, stable compared to prior study, interval removal of right thoracostomy tube is noted.   3.  Multiple cavitary " appearing left pulmonary lesions, see CT performed yesterday for further characterization   4.  Soft tissue gas in the right chest wall      DX-CHEST-LIMITED (1 VIEW)   Final Result         No significant interval change.      CT-CTA CHEST PULMONARY ARTERY W/ RECONS   Final Result         No CT evidence of pulmonary embolus.      Previous right chest tube were removed.      Grossly similar in size of the loculated right hydropneumothorax but there is increased layering fluid component. Unchanged mild shift of the mediastinal to the left.      Redemonstration of a pigtail catheter in the medial left lung base. Grossly similar multiple cavitary lesions in the left lung.      US-EXTREMITY VENOUS LOWER BILAT   Final Result      POCT BUTTERFLY-DUPLEX SCAN EXTREMITY VEINS LIMITED   Final Result      DX-CHEST-PORTABLE (1 VIEW)   Final Result         1.  Pulmonary edema and/or infiltrates, similar to prior study.   2.  Right pneumothorax, somewhat decreased to prior study, interval removal of right thoracostomy tube is noted.   3.  Multiple cavitary appearing left pulmonary lesions, see CT performed yesterday for further characterization   4.  Soft tissue gas in the right chest wall      DX-CHEST-PORTABLE (1 VIEW)   Final Result         1.  Pulmonary edema and/or infiltrates, similar to prior study.   2.  Right pneumothorax, similar to prior study with thoracostomy tube in place.   3.  Multiple cavitary appearing left pulmonary lesions, see CT performed yesterday for further characterization      CT-CHEST (THORAX) W/O   Final Result      Large loculated right hydropneumothorax with interval increase in size of the pneumothorax and pleural fluid.      Right chest tube is in the posterior right thorax.      There is mediastinal shift to the left compatible with tension.      Small pericardial effusion.      Small loculated left pleural effusion with overlying atelectasis/consolidation.   Pigtail catheter is seen at the medial  left lung base. Pleural effusion has decreased in size compared to prior.      There are multiple foci of air extending from the right lung to the pleural space suspicious for a bronchopleural fistula.      Multiple cavitary lesions bilaterally with mild interval decrease of the solid component of the cavitary nodules.      Noncavitary 3.1 cm masslike density is seen at the left lung base.      Findings may be related to septic emboli, malignancy or multifocal abscesses. Short interval follow-up recommended.      Soft tissue air on the right is again seen.      Splenomegaly      Findings discussed with Leti Sun.      DX-CHEST-PORTABLE (1 VIEW)   Final Result      No significant change from prior exam.      CT-HEAD W/O   Final Result      1.  RIGHT frontal ventriculostomy catheter unchanged in position.   2.  Mild cortical atrophy.   3.  No intracranial hemorrhage.   4.  Apparent dural thickening overlying the clivus.  Consider further evaluation with contrast-enhanced MRI.      DX-CHEST-LIMITED (1 VIEW)   Final Result      Interval left basilar pigtail catheter with diminished atelectasis, pleural fluid      Stable right hydropneumothorax with thoracostomy tube in place, considerable soft tissue gas and partial lung collapse      IR-CHEST TUBE-EMPYEMA LEFT   Final Result      1.  CT GUIDED LEFT  10 [employer]ana PIGTAIL CHEST TUBE PLACEMENT FOR POSSIBLE EMPYEMA YIELDING OLD BLOODY FLUID.      2. A CHEST RADIOGRAPH IS FORTHCOMING.      EC-MICAH W/O CONT   Final Result      US-ABDOMEN LTD (SOFT TISSUE)   Final Result      No fluid seen surrounding the electronic implanted spinal stimulator device.      DX-CHEST-LIMITED (1 VIEW)   Final Result      1.  Large right hydropneumothorax with right-sided chest tube in place, likely stable to slightly decreased from prior study.   2.  Small left pleural effusion. Mild improved aeration in the left lung.   3.  Bilateral pulmonary opacities which could be related to combination of  atelectasis, edema and/or infection..      CT-CTA HEAD WITH & W/O-POST PROCESS   Final Result      1.  Patent carotid and vertebral arteries.      2.  Again seen right frontal the jugular peritoneal shunt catheter. There is mild increase in volume of the lateral and third ventricles.      CT-CHEST (THORAX) W/O   Final Result      1.  Interval placement of a right-sided chest tube into a large loculated right-sided pleural effusion. There is now seen a large right-sided hydropneumothorax in the area that previously seen fluid. The chest tube extends into that hydropneumothorax.    There is some residual pleural fluid seen as well. A hydropneumothorax is somewhat loculated with components most prominently inferiorly and medially.      2.  New loculated left pleural effusion.      3.  Again seen multiple bilateral cavitary pulmonary masses which are more prominent than previously seen with increasing cavitation and measure up to 3 cm size. Differential diagnosis includes septic emboli, multifocal abscesses and neoplasm.      4.  Large amount of subcutaneous emphysema on the right.      5.  Splenomegaly.      6.  Multiple support devices.      Fleischner Society pulmonary nodule recommendations:         DX-CHEST-LIMITED (1 VIEW)   Final Result      1.  Support devices as described above.      2.  Right chest tube present with a again seen right-sided pneumothorax, possibly increased in size and loculated.      3.  Worsening bilateral pulmonary infiltrates.      DX-ABDOMEN FOR TUBE PLACEMENT   Final Result      Cortrak feeding tube tip projects in the region of the duodenal bulb.      DX-CHEST-LIMITED (1 VIEW)   Final Result      Loculated right pneumothorax is decreased in size compared to prior.      Small left pleural effusion.      Small loculated right pleural effusion.      Extensive bilateral airspace opacities are not significantly changed.      Soft tissue air on the right is again noted.      DX-CHEST-LIMITED  (1 VIEW)   Final Result      Decreased partially loculated right pleural fluid with increased pleural air. Right chest tube is in place.      Increased hazy opacity in the left lung possibly atelectasis.         US-ABDOMEN LTD (SOFT TISSUE)   Final Result      No drainable fluid collection.      DX-CHEST-PORTABLE (1 VIEW)   Final Result      Decreased partially loculated right pleural fluid with increased pleural air. Right chest tube is in place.      No other significant change.      DX-CHEST-PORTABLE (1 VIEW)   Final Result         1.  Pulmonary edema and/or infiltrates are identified, which are somewhat decreased since the prior exam.   2.  Moderate loculated appearing right pleural effusion, slightly decreased since prior      DX-ABDOMEN FOR TUBE PLACEMENT   Final Result      Feeding tube tip at the distal stomach.      DX-CHEST-PORTABLE (1 VIEW)   Final Result         1.  New chest tube is noted in the right lower hemithorax.      2.  Right pleural effusion is again identified which appears similar to the prior exam.      3.  No new infiltrates or consolidations are identified.      DX-CHEST-PORTABLE (1 VIEW)   Final Result         1.  Pulmonary edema and/or infiltrates are identified, which are stable since the prior exam.   2.  Moderate loculated appearing right pleural effusion      DX-CHEST-PORTABLE (1 VIEW)   Final Result         No significant interval change.          A:   Active Hospital Problems    Diagnosis    • Acute respiratory failure with hypoxia (HCC) [J96.01]      Priority: High   • Acute bacterial endocarditis [I33.0]      Priority: High   • Empyema of right pleural space (HCC) [J86.9]      Priority: High   • Bacteremia due to methicillin susceptible Staphylococcus aureus (MSSA) [R78.81, B95.61]      Priority: High   • Acute encephalopathy [G93.40]      Priority: High   • Fever [R50.9]      Priority: Medium   • Pleural effusion, left [J90]      Priority: Medium   • Atelectasis [J98.11]       Priority: Medium   • CSF pleocytosis [D72.9]      Priority: Medium   • Pneumonia [J18.9]      Priority: Medium   • Pseudotumor cerebri [G93.2]      Priority: Medium     IMO load March 2020     • S/P  shunt [Z98.2]      Priority: Medium   • Prolonged Q-T interval on ECG [R94.31]      Priority: Low   • History of vasculitis [Z86.79]      Priority: Low   • History of complicated migraines [G43.109]      Priority: Low   • Hyponatremia [E87.1]      Priority: Low   • Tachycardia [R00.0]      Priority: Low   • H/O: CVA (cerebrovascular accident) [Z86.73]      Priority: Low   • Chronic pain syndrome [G89.4]      Priority: Low   • Thrombocytopenia (HCC) [D69.6]      Priority: Low   • Spinal cord stimulator status [Z96.89]    • Goals of care, counseling/discussion [Z71.89]    • Abnormal movement [G25.9]    • Pulmonary abscess (HCC) [J85.2]    • 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  [facility]

## 2021-04-29 NOTE — CARE PLAN
Problem: Safety - Medical Restraint  Goal: Remains free of injury from restraints (Restraint for Interference with Medical Device)  Description: INTERVENTIONS:  1. Determine that other, less restrictive measures have been tried or would not be effective before applying the restraint  2. Evaluate the patient's condition at the time of restraint application  3. Inform patient/family regarding the reason for restraint  4. Q2H: Monitor safety, psychosocial status, comfort, nutrition and hydration  Outcome: PROGRESSING AS EXPECTED  Flowsheets (Taken 4/29/2021 1207)  Addressed this shift: Remains free of injury from restraints (restraint for interference with medical device):   Determine that other, less restrictive measures have been tried or would not be effective before applying the restraint   Every 2 hours: Monitor safety, psychosocial status, comfort, nutrition and hydration     Problem: Pain Management  Goal: Pain level will decrease to patient's comfort goal  Outcome: PROGRESSING AS EXPECTED  Note: Using CPOT assessment to adequately assess pt's pain. Administering/ titrating medications to appropriately medicate for pt's pain management.

## 2021-04-29 NOTE — PROGRESS NOTES
Infectious Disease Progress Note    Author: Brian Omer M.D. Date & Time created: 4/29/2021  3:09 PM  Cefepime 4/23 to present.  PRIOR Rx:   Zosyn 4/22-4/23  Previous: Unasyn, Zosyn, Vanco, Daptomycin  Ceftaroline: start 4/13-4/15  Nafcillin 2g Q4 hrs 4/15-4/23 4/14: Unable to give name of previous NSG or neurologist. Seems confused, but able to say some sentences fluently.   4/15: Line cultures now growing MSSA. As well as from the blood. Repeat blood culture 4/13+ in both sets. Patient has worsening confusion. CSF fluid analyses suggest pleocytosis. Cultures are no growth from the CSF. Chest CT was ordered this morning indicating a loculated right pleural effusion as well as bilateral septic emboli. Dr. Mack spoke with Dr. Oh yesterday and no surgical intervention unless clinical deterioration.  4/16: Increased respiratory distress.  Tachypneic.  CT with loculated collection.  Dr Resendiz not available.  No thoracic surgeon available.  Plans to have pulmonary place CT.  Transferring to ICU.  4/17: Transferred to [facility] last night. Hgb dropped to 6.4 requiring PRBC transfusion. Requiring 2 pressors. Fio2 50%. Febrile 38.8. Pending OR decortication of empyema and hemothorax. Chest tube placed at [facility] shows 8,371 WBC, ,000, 74% N  4/18: Chest tube was upsized by surgery yesterday, no decortication. WBC 22k. Fio2 40%. Vasopressin. Levophed down to 2mcg. Afebrile 24 hrs. Last fever 38.6 on 4/16.   4/19: Still on vent. Levo 3 mcg, vasopressin. Afebrile 72 hours. WBC 21k. BC 4/17 NGTD x 48 hours. Unable to do MRI brain given neurostimulator per RN.   4/20: Remaining afebrile. Hb 6.2 and undergoing transfusion today.WBC 24,100, 5% bands.1700 ml CT output. Copious bloody ET secretions. No pressors. Receiving dornase and TPA per CT. Right pleural effusion not large per CXR today.   4/21: WBC 31k. Bronchoscopy yesterday showing blood. Cultures sent. TPA on hold per RN due to arvind blood from chest tube  requiring PRBC transfusion for hgb 6. Pending chest CT today. Per , spinal stimulator is non-MRI compatible. Levophed 10mcg. 30% Fio2. Afebrile.   4/22: Fever 101.3 this am. WBC 20,300 down from 32,200 yesterday. BAL growing Klebsiella pneumoniae but susceptibility panel not set up until today. CTA of brain shows no lesion. CT of chest shows enlarging cavitary lung lesions bilat and large loculated new left pleural effusion and large hydropneumothorax on right. TPA & dornase infusions of right chest ended 4/20 after considerable bleeding requiring transfusion. Hb now down again to 6.8.  4/23: WBC 23k. Fio2 40%. Tmax 38.1. US of stiumlator shows small fluid collection but no drainable abscess. Pending MICAH today. Pending L chest tube placement  4/24: Continue fever 100.8-101.8. WBC 16,600. MICAH shows large vegetations on both tricuspid valve and mitral valve with mild TR & MR.  No aortic root abscess. Left chest tube placed yesterday yielding 8 ml of old bloody fluid. Bronch today revealed copious thick maroon secretions in distal trachea and both mainstem bronchi. On the right these were obstructive. Remaining off pressors. Last positive blood cultures (MSSA) were 4/16, negative 4/17.  4/25: Fever 100.6 this AM. wBC 13,300. Hb 6.6. CT: right hydropneumothorax increasing, right bronchopleural fistula, mediastinal shift ot the left , multiple cavitary pulmonary nodules, 3.1 cm left basilar mass, splenomegaly. CT of head shows dural thickening over the clivus. Lac+ GNR >100,000 col/ml growing from BAL of 4/24, presumed Klebsiella. Left peural fluid culture negative from 4/23.  4/26: Fever 100.4 last night. WBC 13,500. Hb 7.4. Plts 502,000. ESR >120. UA fairly clear except 30 mg% protein, 2-5 wbc and rare rbc. CXR unchanged except more prominent pulmonary edema. GNR in BAL may be a different species of Klebsiella, and resistant to ampicillin & tmp-sulfa; confirmation pending.  4/28: Fever 100.8 last night. WBC  27,700. Hb 6.6. Plts 482,000. Creat 0.19. Kleb pneum in BAL on 4/24 is resistant to ampicillin & tmp-sulfa but otherwise sensitive. O2 sat 96% on FIO2 30% now, PEEP 8. Has been re-evaluated by thoracic surgeon, Dr. Ganser, who believes she will not wean without decortication on the right. Surgery anticipated today.  4/29: S/P right thoracoscopy with decortication with cultures NG at 24 hrs.    Interval History:  Past 24 hrs reviewed with RN    Labs Reviewed, Medications Reviewed, Radiology Reviewed, Wound Reviewed, Fluids Reviewed and GI Nutrition Reviewed    Review of Systems:  Review of Systems   Unable to perform ROS: Intubated       Physical Exam:  Physical Exam  Vitals and nursing note reviewed.   Constitutional:       General: She is not in acute distress.     Appearance: Normal appearance. She is obese. She is ill-appearing. She is not toxic-appearing or diaphoretic.   HENT:      Head: Normocephalic and atraumatic.   Cardiovascular:      Rate and Rhythm: Regular rhythm. Tachycardia present.      Heart sounds: No murmur.   Pulmonary:      Effort: Pulmonary effort is normal. No respiratory distress.      Breath sounds: No wheezing or rhonchi.      Comments: Full vent support.  Abdominal:      General: Abdomen is flat. There is no distension.      Palpations: Abdomen is soft.      Tenderness: There is no abdominal tenderness. There is no guarding.   Skin:     General: Skin is warm and dry.   Neurological:      Comments: Grimaces to pain   Psychiatric:      Comments: Sedated         Labs:  Recent Results (from the past 24 hour(s))   CULTURE TISSUE W/ GRM STAIN    Collection Time: 04/28/21  5:11 PM    Specimen: Tissue   Result Value Ref Range    Significant Indicator NEG     Source TISS     Site Right Pleural Debris     Culture Result -     Gram Stain Result Rare WBCs.  No organisms seen.      Anaerobic Culture    Collection Time: 04/28/21  5:11 PM    Specimen: Tissue   Result Value Ref Range    Significant  Indicator NEG     Source TISS     Site Right Pleural Debris     Culture Result Culture in progress.    GRAM STAIN    Collection Time: 04/28/21  5:11 PM    Specimen: Tissue   Result Value Ref Range    Significant Indicator .     Source TISS     Site Right Pleural Debris     Gram Stain Result Rare WBCs.  No organisms seen.      POCT arterial blood gas device results    Collection Time: 04/28/21  6:22 PM   Result Value Ref Range    Ph 7.299 (LL) 7.400 - 7.500    Pco2 48.8 (H) 26.0 - 37.0 mmHg    Po2 90 (H) 64 - 87 mmHg    Tco2 25 20 - 33 mmol/L    S02 96 93 - 99 %    Hco3 24.0 17.0 - 25.0 mmol/L    BE -3 -4 - 3 mmol/L    Body Temp 37.0 C degrees    O2 Therapy 70 %    iPF Ratio 129     Ph Temp Ben 7.299 (LL) 7.400 - 7.500    Pco2 Temp Co 48.8 (H) 26.0 - 37.0 mmHg    Po2 Temp Cor 90 (H) 64 - 87 mmHg    Specimen Arterial     DelSys Vent     End Tidal Carbon Dioxide 41 mmhg    Tidal Volume 250 mL    Peep End Expiratory Pressure 8 cmh20    Set Rate 20     Mode APV-CMV    POTASSIUM SERUM (K)    Collection Time: 04/28/21  6:55 PM   Result Value Ref Range    Potassium 4.1 3.6 - 5.5 mmol/L   HGB    Collection Time: 04/28/21  6:55 PM   Result Value Ref Range    Hemoglobin 8.8 (L) 12.0 - 16.0 g/dL   POTASSIUM SERUM (K)    Collection Time: 04/28/21 11:30 PM   Result Value Ref Range    Potassium 4.3 3.6 - 5.5 mmol/L   CBC with Differential    Collection Time: 04/29/21  3:55 AM   Result Value Ref Range    WBC 21.3 (H) 4.8 - 10.8 K/uL    RBC 2.63 (L) 4.20 - 5.40 M/uL    Hemoglobin 8.0 (L) 12.0 - 16.0 g/dL    Hematocrit 24.1 (L) 37.0 - 47.0 %    MCV 91.6 81.4 - 97.8 fL    MCH 30.4 27.0 - 33.0 pg    MCHC 33.2 (L) 33.6 - 35.0 g/dL    RDW 58.4 (H) 35.9 - 50.0 fL    Platelet Count 402 164 - 446 K/uL    MPV 9.0 9.0 - 12.9 fL    Neutrophils-Polys 92.90 (H) 44.00 - 72.00 %    Lymphocytes 2.70 (L) 22.00 - 41.00 %    Monocytes 3.50 0.00 - 13.40 %    Eosinophils 0.00 0.00 - 6.90 %    Basophils 0.00 0.00 - 1.80 %    Nucleated RBC 0.00 /100 WBC     Neutrophils (Absolute) 19.79 (H) 2.00 - 7.15 K/uL    Lymphs (Absolute) 0.58 (L) 1.00 - 4.80 K/uL    Monos (Absolute) 0.75 0.00 - 0.85 K/uL    Eos (Absolute) 0.00 0.00 - 0.51 K/uL    Baso (Absolute) 0.00 0.00 - 0.12 K/uL    NRBC (Absolute) 0.00 K/uL    Anisocytosis 1+     Macrocytosis 1+    Basic Metabolic Panel (BMP)    Collection Time: 04/29/21  3:55 AM   Result Value Ref Range    Sodium 133 (L) 135 - 145 mmol/L    Potassium 4.2 3.6 - 5.5 mmol/L    Chloride 102 96 - 112 mmol/L    Co2 24 20 - 33 mmol/L    Glucose 94 65 - 99 mg/dL    Bun 10 8 - 22 mg/dL    Creatinine <0.17 (L) 0.50 - 1.40 mg/dL    Calcium 7.8 (L) 8.5 - 10.5 mg/dL    Anion Gap 7.0 7.0 - 16.0   ESTIMATED GFR    Collection Time: 04/29/21  3:55 AM   Result Value Ref Range    GFR If African American >60 >60 mL/min/1.73 m 2    GFR If Non African American >60 >60 mL/min/1.73 m 2   DIFFERENTIAL MANUAL    Collection Time: 04/29/21  3:55 AM   Result Value Ref Range    Myelocytes 0.90 %    Manual Diff Status PERFORMED    PERIPHERAL SMEAR REVIEW    Collection Time: 04/29/21  3:55 AM   Result Value Ref Range    Peripheral Smear Review see below    PLATELET ESTIMATE    Collection Time: 04/29/21  3:55 AM   Result Value Ref Range    Plt Estimation Normal    MORPHOLOGY    Collection Time: 04/29/21  3:55 AM   Result Value Ref Range    RBC Morphology Present     Polychromia 1+    POCT arterial blood gas device results    Collection Time: 04/29/21  4:15 AM   Result Value Ref Range    Ph 7.397 (L) 7.400 - 7.500    Pco2 41.5 (H) 26.0 - 37.0 mmHg    Po2 111 (H) 64 - 87 mmHg    Tco2 27 20 - 33 mmol/L    S02 98 93 - 99 %    Hco3 25.5 (H) 17.0 - 25.0 mmol/L    BE 1 -4 - 3 mmol/L    Body Temp 37.0 C degrees    O2 Therapy 40 %    iPF Ratio 278     Ph Temp Ben 7.397 (L) 7.400 - 7.500    Pco2 Temp Co 41.5 (H) 26.0 - 37.0 mmHg    Po2 Temp Cor 111 (H) 64 - 87 mmHg    Specimen Arterial     DelSys Vent     End Tidal Carbon Dioxide 34 mmhg    Tidal Volume 250 mL    Peep End  Expiratory Pressure 8 cmh20    Set Rate 24     Mode APV-CMV    POTASSIUM SERUM (K)    Collection Time: 21 12:45 PM   Result Value Ref Range    Potassium 4.2 3.6 - 5.5 mmol/L       Hemodynamics:  Temp (24hrs), Av.9 °C (98.4 °F), Min:36.9 °C (98.4 °F), Max:36.9 °C (98.4 °F)  Temperature: 36.9 °C (98.4 °F), Monitored Temp: 37.7 °C (99.9 °F)  Pulse  Av.4  Min: 40  Max: 149   Blood Pressure: 136/73, Arterial BP: 119/60    Arterial Line 21 Radial (Active)   Site Assessment Clean;Dry;Intact 21   Line Status Pulsatile blood flow 21   Art Line Waveform Appropriate 21   Line Care Calibrated;Flushed per protocol;Leveled;Zeroed 21   Color/Movement/Sensation Pale fingers/toes 21   Perfusion Check Left 21   Dressing Type Tape 21   Dressing Status Clean;Dry;Intact 21   Dressing Intervention N/A 21   Dressing Change Due 21 08   Date Primary Tubing Changed 21 08   Date IV Connector(s) Changed 21 08   NEXT Primary Tubing Change  21   Cuff Pressure Correlates Yes 21       Peripheral IV 22 G Left Upper arm (Active)   Site Assessment Clean;Dry;Intact 21   Dressing Type Occlusive;Transparent 21   Line Status Flushed;Scrubbed the hub prior to access;Saline locked 21   Dressing Status Clean;Dry;Intact 21   Dressing Intervention N/A 21   Dressing Change Due 21 0800   Date Primary Tubing Changed 21 0800   Date Secondary Tubing Changed 21   NEXT Primary Tubing Change  21   NEXT Secondary Tubing Change  21   Infiltration Grading ([facility], [facility]RA) 0 21 08   Phlebitis Scale ([facility] Only) 0 21 08       CVC Triple Lumen 21 Left Subclavian (Active)   Consider Removal of Femoral Line Not  Applicable 04/29/21 0800   Site Assessment Clean;Dry;Intact 04/29/21 0800   Lumen 1 Status Blood return noted;Flushed;Infusing;Scrubbed the hub prior to access 04/29/21 0800   Lumen 3 Status Scrubbed the hub prior to access;Flushed;Infusing 04/29/21 0800   Lumen 2 Status Scrubbed the hub prior to access;Flushed;Infusing 04/29/21 0800   Dressing Type Biopatch;Occlusive;Transparent 04/29/21 0800   Dressing Status Clean;Dry;Intact 04/29/21 0800   Dressing Intervention N/A 04/29/21 0800   Dressing Change Due 05/01/21 04/29/21 0800   Date Primary Tubing Changed 04/28/21 04/29/21 0800   Date Secondary Tubing Changed 04/28/21 04/28/21 2100   Date IV Connector(s) Changed 04/28/21 04/29/21 0800   NEXT Primary Tubing Change  05/01/21 04/29/21 0800   NEXT Secondary Tubing Change  04/29/21 04/28/21 2100   NEXT IV Connector(s) Change 05/01/21 04/29/21 0800   Line Necessity Assessed Antibiotic Therapy Greater than 7 Days 04/29/21 0800     Wound:  @WOUNDLDA(4)@     Fluids:  Intake/Output       04/27/21 0700 - 04/28/21 0659 04/28/21 0700 - 04/29/21 0659 04/29/21 0700 - 04/30/21 0659      0700-1859 1900-0659 Total 0700-1859 1900-0659 Total 0700-1859 1900-0659 Total       Intake    P.O.  --  0 0  --  0 0  0  -- 0    P.O. -- 0 0 -- 0 0 0 -- 0    I.V.  367.4  371.2 738.6  1105.2  363.7 1468.9  131.3  -- 131.3    Magnesium Sulfate Volume 50 -- 50 50 -- 50 -- -- --    Precedex Volume -- -- -- -- -- -- 71.9 -- 71.9    K+ Scale -- 3.9 3.9 -- 8.5 8.5 -- -- --    Propofol Volume 267.4 367.3 634.7 355.2 355.2 710.4 59.5 -- 59.5    IV Volume (Fentanyl) 50 -- 50 -- -- -- -- -- --    Volume (mL) (Lactated Ringers) -- -- -- 700 -- 700 -- -- --    Blood  --  -- --  739.8  -- 739.8  --  -- --    Volume (RELEASE RED BLOOD CELLS) -- -- -- 639.8 -- 639.8 -- -- --    Volume (RELEASE RED BLOOD CELLS) -- -- -- 100 -- 100 -- -- --    NG/GT  560  240 800  0  150 150  320  -- 320    Intake (mL) (Enteral Tube 04/21/21 [facility] - Gastric Right nare) 560 240  800 0 150 150 320 -- 320    IV Piggyback  1300  38.3 1338.3  161.7  -- 161.7  100  -- 100    Volume (mL) (lactated ringer BOLUS infusion) 1000 -- 1000 -- -- -- -- -- --    Volume (mL) (potassium chloride (KCL) ivpb 10 mEq) -- -- -- -- -- -- 100 -- 100    Volume (mL) (potassium chloride in water (KCL) ivpb **Administer in ICU only** 20 mEq) 100 -- 100 -- -- -- -- -- --    Volume (mL) (potassium chloride in water (KCL) ivpb **Administer in ICU only** 20 mEq) 100 -- 100 -- -- -- -- -- --    Volume (mL) (potassium chloride (KCL) ivpb 10 mEq) 100 -- 100 -- -- -- -- -- --    Volume (mL) (potassium chloride (KCL) ivpb 10 mEq) -- -- -- 100 -- 100 -- -- --    Volume (mL) (potassium chloride (KCL) ivpb 10 mEq) -- 38.3 38.3 61.7 -- 61.7 -- -- --    Enteral  90  90 180  --  180 180  60  -- 60    Free Water / Tube Flush 90 90 180 -- 180 180 60 -- 60    Total Intake 2317.4 739.5 3056.9 2006.7 693.7 2700.4 611.3 -- 611.3       Output    Urine  1700  705 2405  1100  675 1775  600  -- 600    Urine Void (mL) -- -- -- 600 -- 600 -- -- --    Output (mL) (Urethral Catheter) 1116 777 4544  600 -- 600    Stool  --  -- --  --  -- --  --  -- --    Number of Times Stooled -- -- -- -- -- -- 1 x -- 1 x    Emesis/NG output  --  -- --  0  -- 0  --  -- --    Output (mL) (Enteral Tube 04/21/21 [facility] - Gastric Right nare) -- -- -- 0 -- 0 -- -- --    Chest Tube  0  0 0  148  272 420  110  -- 110    Output (mL) (Chest Tube 2 Right Midaxillary) -- -- -- 110 112 222 30 -- 30    Output (mL) (Chest Tube 2 Left Other (Comment)) 0 0 0 0 0 0 10 -- 10    Output (mL) (Chest Tube 1 Right Midaxillary;Pleural) -- -- -- 38 160 198 70 -- 70    Blood  --  -- --  200  -- 200  --  -- --    Est. Blood Loss -- -- -- 200 -- 200 -- -- --    Total Output 1739 627 3601 7858 569 7447 710 -- 710       Net I/O     617.4 34.5 651.9 558.7 -253.3 305.4 -98.7 -- -98.7        Weight: 82.4 kg (181 lb 10.5 oz)  GI/Nutrition:  Orders Placed This Encounter   Procedures    • Diet NPO     Standing Status:   Standing     Number of Occurrences:   1     Order Specific Question:   Restrict to:     Answer:   Strict [1]     Medications:  Current Facility-Administered Medications   Medication Last Admin   • dexmedetomidine (PRECEDEX) 400 mcg/100mL NS premix infusion 1.5 mcg/kg/hr at 04/29/21 1323   • furosemide (LASIX) injection 20 mg 20 mg at 04/29/21 1351   • K+ Scale: Goal of 4.5 1 Each at 04/29/21 1337   • enoxaparin (LOVENOX) inj 40 mg 40 mg at 04/29/21 0745   • ceFEPIme (MAXIPIME) 1 g in  mL IVPB 1 g at 04/29/21 1402   • fentaNYL (SUBLIMAZE) 50 mcg/mL in 50mL 200 mcg/hr at 04/29/21 1323   • Pharmacy Consult: Enteral tube insertion - review meds/change route/product selection     • acetaminophen (Tylenol) tablet 650 mg 650 mg at 04/28/21 0232   • magnesium hydroxide (MILK OF MAGNESIA) suspension 30 mL      And   • senna-docusate (PERICOLACE or SENOKOT S) 8.6-50 MG per tablet 2 tablet 2 tablet at 04/29/21 0505    And   • polyethylene glycol/lytes (MIRALAX) PACKET 1 Packet      And   • bisacodyl (DULCOLAX) suppository 10 mg     • divalproex (DEPAKOTE SPRINKLE) capsule 500 mg 500 mg at 04/29/21 1402   • DULoxetine (CYMBALTA) capsule 60 mg 60 mg at 04/29/21 0506   • PARoxetine (PAXIL) tablet 10 mg 10 mg at 04/29/21 0506   • risperiDONE (RISPERDAL) tablet 2 mg 2 mg at 04/29/21 0506   • Respiratory Therapy Consult     • famotidine (PEPCID) tablet 20 mg 20 mg at 04/27/21 1749    Or   • famotidine (PEPCID) injection 20 mg 20 mg at 04/29/21 0506   • MD Alert...ICU Electrolyte Replacement per Pharmacy     • lidocaine (XYLOCAINE) 1 % injection 1-2 mL     • ipratropium-albuterol (DUONEB) nebulizer solution     • fentaNYL (SUBLIMAZE) injection 25 mcg      Or   • fentaNYL (SUBLIMAZE) injection 50 mcg 50 mcg at 04/29/21 0902    Or   • fentaNYL (SUBLIMAZE) injection 100 mcg 100 mcg at 04/29/21 0935     Medical Decision Making, by Problem:  Active Hospital Problems    Diagnosis    • *Acute  bacterial endocarditis [I33.0]    • Acute respiratory failure with hypoxia (HCC) [J96.01]    • Empyema of right pleural space (HCC) [J86.9]    • Bacteremia due to methicillin susceptible Staphylococcus aureus (MSSA) [R78.81, B95.61]    • Acute encephalopathy [G93.40]    • Fever [R50.9]    • Pleural effusion, left [J90]    • Atelectasis [J98.11]    • CSF pleocytosis [D72.9]    • Pneumonia [J18.9]    • Pseudotumor cerebri [G93.2]    • S/P  shunt [Z98.2]    • Prolonged Q-T interval on ECG [R94.31]    • History of vasculitis [Z86.79]    • History of complicated migraines [G43.109]    • Hyponatremia [E87.1]    • Tachycardia [R00.0]    • H/O: CVA (cerebrovascular accident) [Z86.73]    • Chronic pain syndrome [G89.4]    • Thrombocytopenia (HCC) [D69.6]    • Spinal cord stimulator status [Z96.89]    • Goals of care, counseling/discussion [Z71.89]    • Abnormal movement [G25.9]    • Pulmonary abscess (HCC) [J85.2]    • 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       - Acute left empyema s/p chest tube placement 4/23  -Multiple acute septic pulmonary emboli bilaterally  -Acute bilateral pneumonia due to above     --Pulmonary edema  -Pseudotumor cerebri with underlying  shunt: possible arachnoiditis  -Chronic migraine headaches  -History of autoimmune vasculitis  -Elevated alkaline phosphatase & bilirubin  -Acute encephalopathy due to sepsis      - anemia due to above      - New secondary Klebsiella pneumoniae pneumonia (presumed  VAP)     Plan   Patient has evidence of disseminated staphylococcal sepsis source from her line with infection to her lungs, tricuspid & mitral valves and  bilat empyema requiring transfer to [facility] for decortication due to lack of CT surgery support at [facility].  However not a surgical candidate after all. Now with bilateral cavitary lung abscess and empyema. Sputum cultures now growing additional Kleb. May be a candidate for decortication in the future if she stabilizes, but thoracic surgery service has signed off for now.  Persistent fever likely attributable to empyema and multiple lung abscesses.   Consider MRI of brain when clinically feasible. Spinal stimulator may prevent MRI.   - Pt needs CNS coverage for initial MSSA infection, so now on Cefepime 1gm Q8hrs  - Cefepime covers new Klebsiella sp in BAL so no change today.   - US of spinal stimulator shows ill defined fluid collection, but undrainable. NSGY does not recommend stiumulator removal due to instability  - central line exchanged 4/22  - initial CSF cultures has pleocytosis, but no culture growth  - chest tube upsized by surgery 4/17.   - blood culture from 4/14 at [facility] positive. Admission blood cultures here on 4/16 both positive.  4/17 blood cultures NGTD  - Central line now has been changed 4/22  - CSF findings on 4/13:  63 wbc, 82% PMN, protein 97  - original infected port removed 4/12  - vent management per pulmonology    S/P right thoracoscopy with decortication with cultures NG at 24 hrs. Sedation being grimace to pain now. Case and treatment reviewed with patient's , micro and RN 35 min on floor with > 50% face to face view and 100% in direct patient care/counseling today.

## 2021-04-29 NOTE — PROGRESS NOTES
Pt back to floor from surgery. 2 chest tubes placed to right chest set to suction. ET changed during procedure. O2 saturation 83%, RT notified vent setting changed to 70% O2. ABG drawn with no significant changes

## 2021-04-29 NOTE — PROGRESS NOTES
"Critical Care Progress Note    Date of admission  4/16/2021    Chief Complaint  50 y.o. female the past medical history of pseudotumor cerebri status post  shunt by Dr. Oh, chronic pain with history of placement of nerve stimulator, vasculitis, right anterior chest port for home IV meds with recurrent infection who presented 4/11/2021 with to [facility] with recurrent port infection and was initially treated with vancomycin and Zosyn and then changed to ceftaroline and subsequently switched to nafcillin when MSSA was identified.    Hospital Course  \"50 y.o. female the past medical history of pseudotumor cerebri status post  shunt by Dr. Oh, chronic pain with history of placement of nerve stimulator, vasculitis, right anterior chest port for home IV meds with recurrent infection who presented 4/11/2021 with to [facility] with recurrent port infection. Found to have MSSA bacteremia, endocarditis, septic pulmonary emboli/empyema/pneumatocele, and CSF pleocytosis concerning for  shunt involvement.  Seen by Dr. Oh, NSGY, at [facility] who did not recommend  shunt removal.  Seen by Dr. Vargas here for concern of fluid collection around her spinal stimulator and he recommended against removal. Remains intubated, encephalopathic.    4/16 admitted to ICU  4/17 VD 2, 2 FFP, pRBC overnight; new chest tube per gen surg  4/18 VD 3, TPa/Dornase with 1400 cc from R chest tube    4/26: R chest tube not functioning, d/c it.  broke his leg and going to OR today, so not available currently.  VD 11.  RSBI immediately high on SBT attempt. Followed commands for RN      Interval Problem Update  Neuro: Failed SAT due to tube biting and agitation, dex, fentanyl  HR: 100-110s  SBP: 100-170s  Tmax: AF  GI: BM 28th, goal on TF  I/O: nevarez  Lines: L subclavian TLC, art line, PIV. CT x3  Mobility: contraindicated d/t agitation and respratory status  Resp: VD 14, 24/300/8/30%. 7/39/41/111/25   Vte: lovenox  PPI/H2:pepcid  Antibx: d14 total " antibiotcs, d8 cefepime    Decortication yesterday afternoon  Tachy/hypertensive with SBTs  Switch to precedex to try and optimize SAT/SBT conditions  Clamp L chest tube today      Review of Systems  Review of Systems   Unable to perform ROS: Intubated        Vital Signs for last 24 hours   Temp:  [36.9 °C (98.4 °F)-37.8 °C (100 °F)] 36.9 °C (98.4 °F)  Pulse:  [] 102  Resp:  [11-36] 27  BP: ()/(43-79) 102/55  SpO2:  [83 %-100 %] 97 %    Hemodynamic parameters for last 24 hours       Respiratory Information for the last 24 hours  Vent Mode: APVCMV  Rate (breaths/min): 24  Vt Target (mL): 250  PEEP/CPAP: 8  MAP: 11  Control VTE (exp VT): 332    Physical Exam   Physical Exam  Constitutional:       Appearance: She is ill-appearing.      Interventions: She is sedated and intubated.   HENT:      Mouth/Throat:      Mouth: Mucous membranes are moist.   Eyes:      Pupils: Pupils are equal, round, and reactive to light.   Cardiovascular:      Rate and Rhythm: Regular rhythm. Tachycardia present.      Heart sounds: Murmur present.   Pulmonary:      Effort: She is intubated.      Comments: R chest wall is nearly fixed with all respiratory motion in the L lung.  Slightly improved post-op    Bilateral chest tubes  Abdominal:      General: Bowel sounds are normal.      Tenderness: There is no abdominal tenderness. There is no guarding.   Musculoskeletal:      Right lower leg: Edema present.      Left lower leg: Edema present.      Comments: Generalized anasarca   Skin:     General: Skin is warm and dry.   Neurological:      Comments: Deeply sedated   Psychiatric:      Comments: Unable to assess         Medications  Current Facility-Administered Medications   Medication Dose Route Frequency Provider Last Rate Last Admin   • potassium chloride (KCL) ivpb 10 mEq  10 mEq Intravenous Once Leti Sun M.D. 100 mL/hr at 04/29/21 0602 10 mEq at 04/29/21 0602   • dexmedetomidine (PRECEDEX) 400 mcg/100mL NS premix infusion   0.1-1.5 mcg/kg/hr Intravenous Continuous Leti Sun M.D.       • K+ Scale: Goal of 4.5  1 Each Intravenous Q6HRS Benoit Loya Jr. D.O.   1 Each at 04/29/21 0600   • enoxaparin (LOVENOX) inj 40 mg  40 mg Subcutaneous DAILY Benoit Loya Jr., D.O.   40 mg at 04/27/21 0505   • ceFEPIme (MAXIPIME) 1 g in  mL IVPB  1 g Intravenous Q8HRS Marlon Cox D.O.   Stopped at 04/29/21 0555   • fentaNYL (SUBLIMAZE) 50 mcg/mL in 50mL   Intravenous Continuous Viraj Burgos M.D. 4 mL/hr at 04/29/21 0125 2,500 mcg at 04/29/21 0125   • Pharmacy Consult: Enteral tube insertion - review meds/change route/product selection  1 Each Other PHARMACY TO DOSE Elliott Salvador M.D.       • acetaminophen (Tylenol) tablet 650 mg  650 mg Enteral Tube Q4HRS PRN Elliott Salvador M.D.   650 mg at 04/28/21 0232   • magnesium hydroxide (MILK OF MAGNESIA) suspension 30 mL  30 mL Enteral Tube QDAY PRN Elliott Salvador M.D.        And   • senna-docusate (PERICOLACE or SENOKOT S) 8.6-50 MG per tablet 2 tablet  2 tablet Enteral Tube BID Elliott Salvador M.D.   2 tablet at 04/29/21 0505    And   • polyethylene glycol/lytes (MIRALAX) PACKET 1 Packet  1 Packet Enteral Tube QDAY PRN Elliott Salvador M.D.        And   • bisacodyl (DULCOLAX) suppository 10 mg  10 mg Rectal QDAY PRN Elliott Salvador M.D.       • divalproex (DEPAKOTE SPRINKLE) capsule 500 mg  500 mg Enteral Tube Q8HRS Elliott Salvador M.D.   500 mg at 04/29/21 0505   • DULoxetine (CYMBALTA) capsule 60 mg  60 mg Enteral Tube BID Elliott Salvador M.D.   60 mg at 04/29/21 0506   • PARoxetine (PAXIL) tablet 10 mg  10 mg Enteral Tube QAM Elliott Salvador M.D.   10 mg at 04/29/21 0506   • risperiDONE (RISPERDAL) tablet 2 mg  2 mg Enteral Tube BID lEliott Salvdaor M.D.   2 mg at 04/29/21 0506   • Respiratory Therapy Consult   Nebulization Continuous RT Man Wahl M.D.       • famotidine (PEPCID) tablet 20 mg  20 mg Enteral Tube Q12HRS Man Wahl M.D.   20 mg at  04/27/21 1749    Or   • famotidine (PEPCID) injection 20 mg  20 mg Intravenous Q12HRS Man Wahl M.D.   20 mg at 04/29/21 0506   • MD Alert...ICU Electrolyte Replacement per Pharmacy   Other PHARMACY TO DOSE Man Wahl M.D.       • lidocaine (XYLOCAINE) 1 % injection 1-2 mL  1-2 mL Tracheal Tube Q30 MIN PRN Man Wahl M.D.       • ipratropium-albuterol (DUONEB) nebulizer solution  3 mL Nebulization Q2HRS PRN (RT) Man Wahl M.D.       • fentaNYL (SUBLIMAZE) injection 25 mcg  25 mcg Intravenous Q HOUR PRN Man Wahl M.D.        Or   • fentaNYL (SUBLIMAZE) injection 50 mcg  50 mcg Intravenous Q HOUR PRN Man Wahl M.D.        Or   • fentaNYL (SUBLIMAZE) injection 100 mcg  100 mcg Intravenous Q HOUR PRN Man Wahl M.D.   100 mcg at 04/28/21 2107       Fluids    Intake/Output Summary (Last 24 hours) at 4/29/2021 0652  Last data filed at 4/29/2021 0600  Gross per 24 hour   Intake 2700.4 ml   Output 2395 ml   Net 305.4 ml       Laboratory  Recent Labs     04/28/21  0827 04/28/21  1822 04/29/21  0415   ISTATAPH 7.448 7.299* 7.397*   ISTATAPCO2 34.4 48.8* 41.5*   ISTATAPO2 86 90* 111*   ISTATATCO2 25 25 27   WCKUFFH8VYC 97 96 98   ISTATARTHCO3 23.8 24.0 25.5*   ISTATARTBE 0 -3 1   ISTATTEMP 37.7 C 37.0 C 37.0 C   ISTATFIO2 30 70 40   ISTATSPEC Arterial Arterial Arterial   ISTATAPHTC 7.438 7.299* 7.397*   [id number] 90* 90* 111*         Recent Labs     04/27/21  0210 04/27/21  0500 04/28/21  0427 04/28/21  1215 04/28/21  1855 04/28/21  2330 04/29/21  0355   SODIUM 129*  --  131*  --   --   --  133*   POTASSIUM 3.7   < > 3.9   < > 4.1 4.3 4.2   CHLORIDE 98  --  99  --   --   --  102   CO2 24  --  24  --   --   --  24   BUN 14  --  11  --   --   --  10   CREATININE 0.27*  --  0.19*  --   --   --  <0.17*   MAGNESIUM 1.8  --  1.8  --   --   --   --    CALCIUM 8.1*  --  8.2*  --   --   --  7.8*    < > = values in this interval not displayed.     Recent Labs      04/26/21  1500 04/27/21  0210 04/28/21  0427 04/29/21  0355   PREALBUMIN 11.7*  --   --   --    GLUCOSE  --  101* 91 94     Recent Labs     04/27/21  0210 04/28/21  0427 04/29/21  0355   WBC 15.2* 27.7* 21.3*   NEUTSPOLYS 79.00* 84.80* 92.90*   LYMPHOCYTES 7.20* 1.70* 2.70*   MONOCYTES 10.20 7.60 3.50   EOSINOPHILS 0.00 0.00 0.00   BASOPHILS 0.60 1.70 0.00     Recent Labs     04/27/21  0210 04/27/21  0210 04/28/21  0427 04/28/21  1855 04/29/21  0355   RBC 2.41*  --  2.18*  --  2.63*   HEMOGLOBIN 7.3*   < > 6.6* 8.8* 8.0*   HEMATOCRIT 22.6*  --  20.9*  --  24.1*   PLATELETCT 519*  --  482*  --  402    < > = values in this interval not displayed.       Imaging  X-Ray:  I have personally reviewed the images and compared with prior images.  CT:    Reviewed  Echo:   Reviewed    Assessment/Plan  * Acute bacterial endocarditis- (present on admission)  Assessment & Plan  MSSA  Cultures cleared 4/17  Infected port removed at [facility]   shunt and spinal stimulator could be seeded, NSGY has consulted on both and don't recommend removal  MV and TV vegetations with septic pulmonary emboli  Extended cefepime  ID consulting  No evidence based surgical indications (valvular CHF, difficult pathogen, etc) so no cardiovascular surgery consult is necessary at this time      Bacteremia due to methicillin susceptible Staphylococcus aureus (MSSA)- (present on admission)  Assessment & Plan  Source presumed right anterior chest line/port with endocarditis  See discussion of endocarditis above        Empyema of right pleural space (HCC)- (present on admission)  Assessment & Plan  R empyema due to septic pulmonary emboli  Chest tube placement 4/16  At [facility], Upsized 4/18 Dr Benedict, stopped functioning so d/c'ed 4/26  Received TPA/dornase from 4/18-20.  Stopped b/c Hgb dropped requiring transfusion  S/p VATS and decortication 4/28  May need another intervention in future, per Dr. Ganser        Acute respiratory failure with hypoxia (HCC)- (present  on admission)  Assessment & Plan  Intubated 4/15 for respiratory failure at [facility]  Due to hydro/pneumo, trapped lung, lung abscesses  Lung protective ventilation strategies  Appropriate vent bundles  SAT/SBT  If unable to liberate in the next couple days, will need trach for long term support        Acute encephalopathy- (present on admission)  Assessment & Plan  Multifactorial including septic and metabolic  Status post lumbar puncture with cytosis, negative Gram stain and cultures NGTD   shunt for pseudotumor cerebri  Not candidate for MRI d/t spinal stimulator  Ct repeat no intracranial abnormalities on 4/21  EEG with encephalopathy and no seizures  Minimize sedation as able  Her problem list includes autoimmune cerebritis.  Need to clarify the history of this, as I don't see anything in her prior d/c summaries about an autoimmune cerebritis    Pleural effusion, left  Assessment & Plan  Loculated fluid  Probably parapneumonic fluid from abscesses  CT guided chest tube placed 4/23, fluid sent  Lymph predominant, NGTD  TPA/dornase one dose on 4/24.  Hold d/t dropping Hgb  CT chest 4/25 with resolution of fluid    Fever  Assessment & Plan  Worsening fever curse 4/25  BAL from 4/24 with sensitive klebsiella  Blood cultures and UA are unrevealing    Pneumonia  Assessment & Plan  MSSA septic emboli with empyema   BAL 4/20 klebsiella pneum  BAL 4/24 still heavy growth of klebsiella  On cefepime    CSF pleocytosis- (present on admission)  Assessment & Plan  Status post lumbar puncture 4/13  WBC 53, 82% polys, Glucose 47, protein 97 Gram stain negative and culture negative at [facility]  Neurosurgery aware,  shunt in place  MRI brain requested by neurosurgery at [facility], not candidate d/t stimulator  Repeat ct no embolic lesions  cefepime for CNS penetration    Acute blood loss anemia  Assessment & Plan  Hgb dropped again after TPA/dornase on 4/25  No clear source of bleed  D/c TPA/dornase  Hgb stable  Transfuse >7    S/P   shunt- (present on admission)  Assessment & Plan  History of pseudotumor cerebri  History of  shunt by Dr. Oh  [facility]. [facility], recommended MRI but unable given her spinal stimulator  I sent a message to Dr. Oh on 4/27, but he is not on call so may not be available.  She will need a long-term plan for the possibly infected  shunt    Goals of care, counseling/discussion  Assessment & Plan  Palliative is consulting  I spoke with Benoit on 4/27. I explained my concern that even if Enedelia were to survive this her QOL would likely be quite a bit worse.  She won't be able to have another port and was receiving IV meds for HA 3-4 times weekly.  She also could have worse headaches after this episode of meningitis.  He asked if she could have brain damage, and I explained that was possible but hard to know right now.  He says part of him thinks Enedelia would want to keep fighting and part of him thinks she would say to stop aggressive care because her QOL was already very limited by her headaches. Continue aggressive care for now.    Spinal cord stimulator status  Assessment & Plan  Patient's stimulator is Nuvectra. It is FDA approved as MRI compatible, but the company has gone out of business, so there is no support team to assist with MRI.  Thus, if MRI were performed, our radiologists would need to protocol it correctly to manage the stimulator. The former rep from [employer] is Ander, [phone].  Information obtained from Dr. Mahoney's office, [phone].    Thrombocytosis (HCC)  Assessment & Plan  Probably reactive    Anasarca  Assessment & Plan  Mild diffuse peripheral edema  Probably due to critical illness, hypoalbuminemia, prior volume resuscitation  Gentle diuresis    Trapped lung  Assessment & Plan  Decortication 4/28  Continue chest tubes to suction and hope lungs expand    Pulmonary abscess (HCC)  Assessment & Plan  Septic emboli from TV  endocarditis    Anemia  Assessment & Plan  Has required a few transfusions  No obvious source of bleed   Transfuse <7    Septic shock (HCC)- (present on admission)  Assessment & Plan  Shock resolved    Hypokalemia  Assessment & Plan  K-scale    History of vasculitis- (present on admission)  Assessment & Plan  Monitor    Prolonged Q-T interval on ECG- (present on admission)  Assessment & Plan  Avoid QT prolonging agents as able  Optimize electrolytes  Cardiac monitoring  EKG    History of complicated migraines- (present on admission)  Assessment & Plan  Home regimen    Hyponatremia  Assessment & Plan  Mild, monitor    Tachycardia  Assessment & Plan  Sinus  Stable  No PE on CT  Monitor on tele    Abnormal LFTs  Assessment & Plan  Chronic alk phos elevation  GGT high c/w liver source  Outpatient workup    H/O: CVA (cerebrovascular accident)- (present on admission)  Assessment & Plan  Monitor    Chronic pain syndrome- (present on admission)  Assessment & Plan  Chronic back pain and intractable headaches  History of nerve stimulator   Dr Vargas consulted, no plan to remove stimulator due to her clinical instability     VTE:  lovenox  Ulcer: H2 Antagonist  Lines: Central Line  Ongoing indication addressed and Lopez Catheter  Ongoing indication addressed    I have performed a physical exam and reviewed and updated ROS and Plan today (4/29/2021). In review of yesterday's note (4/28/2021), there are no changes except as documented above.       Discussed patient condition and risk of morbidity and/or mortality with RN, RT, Pharmacy, Dietary, Code status disscussed and Charge nurse / hot rounds.      The patient remains critically ill requiring mechanical ventilation, ongoing management of very complex pleural space bilaterally.  Critical care time = 35 minutes in directly providing and coordinating critical care and extensive data review.  No time overlap and excludes procedures

## 2021-04-29 NOTE — ANESTHESIA TIME REPORT
Anesthesia Start and Stop Event Times     Date Time Event    4/28/2021 1507 Ready for Procedure     1607 Anesthesia Start     1758 Anesthesia Stop        Responsible Staff  04/28/21    Name Role Begin End    Beth Garcia M.D. Anesth 1607 1753        Preop Diagnosis (Free Text):  Pre-op Diagnosis     Right loculated hydropneumothorax, empyema        Preop Diagnosis (Codes):    Post op Diagnosis  Hydropneumothorax      Premium Reason  B. 1st Call    Comments:

## 2021-04-29 NOTE — OR SURGEON
Immediate Post OP Note    PreOp Diagnosis: Right loculated hydropneumothorax, empyema      PostOp Diagnosis: Same      Procedure(s):  RIGHT THORACOSCOPY  DECORTICATION, LUNG      Surgeon(s):  John H Ganser, M.D.    Anesthesiologist/Type of Anesthesia:  Anesthesiologist: Beth Garcia M.D./* No anesthesia type entered *    Surgical Staff:  Assistant: Leonor Alfaro; DARIUS Bennett  Circulator: Mariam Cai R.N.  Operating Room Assistant (ORA): Francisco Remy Scrub: Chava Clark  Scrub Person: Susie Jeffries  Pre-Post Nurse: Thaddeus Dos Santos R.N.    Specimens removed if any:  * No specimens in log *    Estimated Blood Loss: 50ml    Findings: Large amount old clot, trapped lung    Complications: 0        4/28/2021 5:22 PM John H Ganser, M.D.

## 2021-04-29 NOTE — CARE PLAN
Problem: Safety  Goal: Will remain free from injury  Outcome: PROGRESSING AS EXPECTED  Fall precautions in place. Bed in lowest position. Non-skid socks in place. Personal possessions within reach. Mobility sign on door. Bed-alarm on.      Problem: Safety - Medical Restraint  Goal: Remains free of injury from restraints (Restraint for Interference with Medical Device)  Description: INTERVENTIONS:  1. Determine that other, less restrictive measures have been tried or would not be effective before applying the restraint  2. Evaluate the patient's condition at the time of restraint application  3. Inform patient/family regarding the reason for restraint  4. Q2H: Monitor safety, psychosocial status, comfort, nutrition and hydration  Outcome: PROGRESSING AS EXPECTED  Pt is in bilat soft wrist restraints. Order verified and documentation in place. Pt educated at this time.

## 2021-04-29 NOTE — OP REPORT
DATE OF SERVICE:  04/28/2021     PREOPERATIVE DIAGNOSIS:  Loculated right hydropneumothorax with empyema.     POSTOPERATIVE DIAGNOSIS:  Loculated right hydropneumothorax with empyema.     PROCEDURE:  Right thoracoscopy with evacuation of old clots and decortication.     SURGEON:  John H. Ganser, MD     ASSISTANT:  Ronny Reece PA-C.     ANESTHESIA:  General.     ANESTHESIOLOGIST:  Beth Garcia MD     INDICATIONS:  The patient is a 50-year-old female admitted with septic   pulmonary emboli from an infected Port-A-Cath with endocarditis.  Attempts at   thrombolytics were done in the chest cavity, which developed some bleeding.    She now has what appears to be a fairly sizable hydropneumothoraces with   trapped lung and midline shift to the left.  Risks, benefits and alternatives   to thoracoscopic decortication with possible need for thoracotomy were   outlined with her family and they wished to proceed.     DESCRIPTION OF PROCEDURE:  The patient identified and general anesthetic   administered with a double lumen endotracheal tube.  Arterial line was placed.    She was placed in the left lateral decubitus position, right chest prepped   and draped in the usual sterile fashion.  The old chest tube site was used and   a 5 mm trocar inserted.  Old blood was suctioned.  The camera was inserted   and there was a large amount of loculated fluid and old clot present.  Second   incision was made anteriorly in approximately eighth intercostal space and   blunt dissection carried into the chest cavity and a suction cannula was   inserted there and as much of the old blood suction as possible.  The ring   forcep was used to evacuate a large amount of old clot from the chest cavity.    The lung was densely adherent anteriorly in the upper lobe and this was freed   up and a portion of the rind here was sent for cultures.  The lower lid was   freed up laterally.  There was still a fair amount of trapping of the lung,   but  given that she was very woozy and unstable, it was felt that a thoracotomy   was not warranted at this time.  Considerable time was carried out freeing as   much of the lung as possible and removing as much debris as possible.  The   chest was irrigated and hemostasis assured.  A 28 straight and 32 angled chest   tubes were placed through the incisions.     Lung was re-expanded and was still somewhat trapped, but definitely expanding   better than it had been.  A 28-Micronesian straight and 32-Micronesian angled chest   tubes were placed and secured to the skin with silk and attached to pleural   suction devices.  Sterile dressings were applied.  The double lumen tube was   changed out for a single lumen tube by anesthesia and the patient is to be   returned directly to the intensive care unit on the ventilator.     Sponge and needle counts were correct x2 at the end of procedure.     ESTIMATED BLOOD LOSS:  Approximately 50 mL.        ______________________________  JOHN H. GANSER, MD [facility]/ALEXANDRA/ABHIJIT    DD:  04/28/2021 17:27  DT:  04/28/2021 19:20    Job#:  [id number]

## 2021-04-29 NOTE — ANESTHESIA PROCEDURE NOTES
Airway    Date/Time: 4/28/2021 4:18 PM  Performed by: Beth Garcia M.D.  Authorized by: Beth Garcia M.D.     Location:  OR  Urgency:  Elective  Difficult Airway: Yes     Pt is anterior and has been intubated for 2 weeks.  Pt has blood in the airways and is swollen.  Indications for Airway Management:  Anesthesia      Spontaneous Ventilation: absent    Sedation Level:  Deep  Preoxygenated: Yes    Patient Position:  Sniffing  Mask Difficulty Assessment:  1 - vent by mask  Final Airway Type:  Endotracheal airway  Final Endotracheal Airway:  ETT - double lumen right with ONE LUNG VENTILATION  Cuffed: Yes    Technique Used for Successful ETT Placement:  Flexible bronchoscopy  Devices/Methods Used in Placement:  Anterior pressure/BURP    Insertion Site:  Oral  Blade Type:  Glide  Laryngoscope Blade/Videolaryngoscope Blade Size:  3  ETT Double Lumen (fr):  35  Measured from:  Teeth  ETT to Teeth (cm):  24  Placement Verified by: auscultation, bronchoscopy and capnometry    Number of Attempts at Approach:  1  Ventilation Between Attempts:  BVM

## 2021-04-30 ENCOUNTER — APPOINTMENT (OUTPATIENT)
Dept: RADIOLOGY | Facility: MEDICAL CENTER | Age: 51
DRG: 003 | End: 2021-04-30
Attending: INTERNAL MEDICINE
Payer: MEDICARE

## 2021-04-30 LAB
ANION GAP SERPL CALC-SCNC: 4 MMOL/L (ref 7–16)
ANISOCYTOSIS BLD QL SMEAR: ABNORMAL
BACTERIA BLD CULT: NORMAL
BACTERIA BLD CULT: NORMAL
BACTERIA TISS AEROBE CULT: ABNORMAL
BACTERIA TISS AEROBE CULT: ABNORMAL
BASE EXCESS BLDA CALC-SCNC: -3 MMOL/L (ref -4–3)
BASOPHILS # BLD AUTO: 0 % (ref 0–1.8)
BASOPHILS # BLD: 0 K/UL (ref 0–0.12)
BODY TEMPERATURE: NORMAL DEGREES
BREATHS SETTING VENT: 24
BUN SERPL-MCNC: 14 MG/DL (ref 8–22)
CALCIUM SERPL-MCNC: 7.9 MG/DL (ref 8.5–10.5)
CHLORIDE SERPL-SCNC: 105 MMOL/L (ref 96–112)
CO2 BLDA-SCNC: 22 MMOL/L (ref 20–33)
CO2 SERPL-SCNC: 24 MMOL/L (ref 20–33)
CREAT SERPL-MCNC: 0.19 MG/DL (ref 0.5–1.4)
DELSYS IDSYS: NORMAL
END TIDAL CARBON DIOXIDE IECO2: 40 MMHG
EOSINOPHIL # BLD AUTO: 0 K/UL (ref 0–0.51)
EOSINOPHIL NFR BLD: 0 % (ref 0–6.9)
ERYTHROCYTE [DISTWIDTH] IN BLOOD BY AUTOMATED COUNT: 58.5 FL (ref 35.9–50)
GLUCOSE SERPL-MCNC: 121 MG/DL (ref 65–99)
GRAM STN SPEC: ABNORMAL
HCO3 BLDA-SCNC: 20.9 MMOL/L (ref 17–25)
HCT VFR BLD AUTO: 22.1 % (ref 37–47)
HGB BLD-MCNC: 7.1 G/DL (ref 12–16)
HGB BLD-MCNC: 7.3 G/DL (ref 12–16)
HOROWITZ INDEX BLDA+IHG-RTO: 263 MM[HG]
LYMPHOCYTES # BLD AUTO: 0.75 K/UL (ref 1–4.8)
LYMPHOCYTES NFR BLD: 3.5 % (ref 22–41)
MACROCYTES BLD QL SMEAR: ABNORMAL
MANUAL DIFF BLD: NORMAL
MCH RBC QN AUTO: 29.8 PG (ref 27–33)
MCHC RBC AUTO-ENTMCNC: 32.1 G/DL (ref 33.6–35)
MCV RBC AUTO: 92.9 FL (ref 81.4–97.8)
MODE IMODE: NORMAL
MONOCYTES # BLD AUTO: 0.94 K/UL (ref 0–0.85)
MONOCYTES NFR BLD AUTO: 4.4 % (ref 0–13.4)
MORPHOLOGY BLD-IMP: NORMAL
MYELOCYTES NFR BLD MANUAL: 1.7 %
NEUTROPHILS # BLD AUTO: 19.26 K/UL (ref 2–7.15)
NEUTROPHILS NFR BLD: 90.4 % (ref 44–72)
NRBC # BLD AUTO: 0.02 K/UL
NRBC BLD-RTO: 0.1 /100 WBC
O2/TOTAL GAS SETTING VFR VENT: 30 %
PCO2 BLDA: 32.1 MMHG (ref 26–37)
PCO2 TEMP ADJ BLDA: 33.8 MMHG (ref 26–37)
PEEP END EXPIRATORY PRESSURE IPEEP: 8 CMH20
PH BLDA: 7.42 [PH] (ref 7.4–7.5)
PH TEMP ADJ BLDA: 7.4 [PH] (ref 7.4–7.5)
PLATELET # BLD AUTO: 482 K/UL (ref 164–446)
PLATELET BLD QL SMEAR: NORMAL
PMV BLD AUTO: 9.1 FL (ref 9–12.9)
PO2 BLDA: 79 MMHG (ref 64–87)
PO2 TEMP ADJ BLDA: 86 MMHG (ref 64–87)
POLYCHROMASIA BLD QL SMEAR: NORMAL
POTASSIUM SERPL-SCNC: 3.5 MMOL/L (ref 3.6–5.5)
POTASSIUM SERPL-SCNC: 3.6 MMOL/L (ref 3.6–5.5)
POTASSIUM SERPL-SCNC: 3.7 MMOL/L (ref 3.6–5.5)
POTASSIUM SERPL-SCNC: 4 MMOL/L (ref 3.6–5.5)
RBC # BLD AUTO: 2.38 M/UL (ref 4.2–5.4)
RBC BLD AUTO: PRESENT
SAO2 % BLDA: 96 % (ref 93–99)
SIGNIFICANT IND 70042: ABNORMAL
SIGNIFICANT IND 70042: NORMAL
SIGNIFICANT IND 70042: NORMAL
SITE SITE: ABNORMAL
SITE SITE: NORMAL
SITE SITE: NORMAL
SODIUM SERPL-SCNC: 133 MMOL/L (ref 135–145)
SOURCE SOURCE: ABNORMAL
SOURCE SOURCE: NORMAL
SOURCE SOURCE: NORMAL
SPECIMEN DRAWN FROM PATIENT: NORMAL
TIDAL VOLUME IVT: 300 ML
TRIGL SERPL-MCNC: 149 MG/DL (ref 0–149)
WBC # BLD AUTO: 21.3 K/UL (ref 4.8–10.8)

## 2021-04-30 PROCEDURE — 700111 HCHG RX REV CODE 636 W/ 250 OVERRIDE (IP): Performed by: INTERNAL MEDICINE

## 2021-04-30 PROCEDURE — 770022 HCHG ROOM/CARE - ICU (200)

## 2021-04-30 PROCEDURE — A9270 NON-COVERED ITEM OR SERVICE: HCPCS | Performed by: INTERNAL MEDICINE

## 2021-04-30 PROCEDURE — 80048 BASIC METABOLIC PNL TOTAL CA: CPT

## 2021-04-30 PROCEDURE — 99291 CRITICAL CARE FIRST HOUR: CPT | Performed by: INTERNAL MEDICINE

## 2021-04-30 PROCEDURE — 700102 HCHG RX REV CODE 250 W/ 637 OVERRIDE(OP): Performed by: INTERNAL MEDICINE

## 2021-04-30 PROCEDURE — 71045 X-RAY EXAM CHEST 1 VIEW: CPT

## 2021-04-30 PROCEDURE — 94799 UNLISTED PULMONARY SVC/PX: CPT

## 2021-04-30 PROCEDURE — 85018 HEMOGLOBIN: CPT

## 2021-04-30 PROCEDURE — 700105 HCHG RX REV CODE 258: Performed by: INTERNAL MEDICINE

## 2021-04-30 PROCEDURE — 700101 HCHG RX REV CODE 250: Performed by: INTERNAL MEDICINE

## 2021-04-30 PROCEDURE — 84478 ASSAY OF TRIGLYCERIDES: CPT

## 2021-04-30 PROCEDURE — 84132 ASSAY OF SERUM POTASSIUM: CPT

## 2021-04-30 PROCEDURE — 85027 COMPLETE CBC AUTOMATED: CPT

## 2021-04-30 PROCEDURE — 82803 BLOOD GASES ANY COMBINATION: CPT

## 2021-04-30 PROCEDURE — 94003 VENT MGMT INPAT SUBQ DAY: CPT

## 2021-04-30 PROCEDURE — 37799 UNLISTED PX VASCULAR SURGERY: CPT

## 2021-04-30 PROCEDURE — 85007 BL SMEAR W/DIFF WBC COUNT: CPT

## 2021-04-30 RX ORDER — POTASSIUM CHLORIDE 14.9 MG/ML
20 INJECTION INTRAVENOUS ONCE
Status: COMPLETED | OUTPATIENT
Start: 2021-04-30 | End: 2021-04-30

## 2021-04-30 RX ORDER — POTASSIUM CHLORIDE 7.45 MG/ML
10 INJECTION INTRAVENOUS ONCE
Status: COMPLETED | OUTPATIENT
Start: 2021-04-30 | End: 2021-04-30

## 2021-04-30 RX ORDER — OXYCODONE HCL 20 MG/ML
10 CONCENTRATE, ORAL ORAL EVERY 4 HOURS
Status: DISCONTINUED | OUTPATIENT
Start: 2021-04-30 | End: 2021-05-02

## 2021-04-30 RX ORDER — LABETALOL HYDROCHLORIDE 5 MG/ML
10 INJECTION, SOLUTION INTRAVENOUS EVERY 4 HOURS PRN
Status: DISCONTINUED | OUTPATIENT
Start: 2021-04-30 | End: 2021-06-16

## 2021-04-30 RX ORDER — MIDAZOLAM HYDROCHLORIDE 1 MG/ML
2 INJECTION INTRAMUSCULAR; INTRAVENOUS
Status: DISCONTINUED | OUTPATIENT
Start: 2021-04-30 | End: 2021-05-09

## 2021-04-30 RX ADMIN — Medication 600 MCG/HR: at 01:04

## 2021-04-30 RX ADMIN — CEFEPIME 1 G: 1 INJECTION, POWDER, FOR SOLUTION INTRAMUSCULAR; INTRAVENOUS at 05:18

## 2021-04-30 RX ADMIN — FUROSEMIDE 20 MG: 10 INJECTION, SOLUTION INTRAMUSCULAR; INTRAVENOUS at 05:19

## 2021-04-30 RX ADMIN — OXYCODONE HYDROCHLORIDE 10 MG: 100 SOLUTION ORAL at 22:25

## 2021-04-30 RX ADMIN — KETAMINE HYDROCHLORIDE 0.3 MG/KG/HR: 50 INJECTION INTRAMUSCULAR; INTRAVENOUS at 11:38

## 2021-04-30 RX ADMIN — KETAMINE HYDROCHLORIDE 1.5 MG/KG/HR: 50 INJECTION INTRAMUSCULAR; INTRAVENOUS at 18:50

## 2021-04-30 RX ADMIN — Medication 600 MCG/HR: at 01:01

## 2021-04-30 RX ADMIN — POTASSIUM CHLORIDE 10 MEQ: 7.46 INJECTION, SOLUTION INTRAVENOUS at 05:25

## 2021-04-30 RX ADMIN — PROPOFOL 60 MCG/KG/MIN: 10 INJECTION, EMULSION INTRAVENOUS at 09:30

## 2021-04-30 RX ADMIN — FAMOTIDINE 20 MG: 20 TABLET ORAL at 18:24

## 2021-04-30 RX ADMIN — Medication 350 MCG/HR: at 06:53

## 2021-04-30 RX ADMIN — PAROXETINE HYDROCHLORIDE 10 MG: 20 TABLET, FILM COATED ORAL at 05:25

## 2021-04-30 RX ADMIN — FENTANYL CITRATE 250 MCG/HR: 50 INJECTION, SOLUTION INTRAMUSCULAR; INTRAVENOUS at 10:01

## 2021-04-30 RX ADMIN — FUROSEMIDE 20 MG: 10 INJECTION, SOLUTION INTRAMUSCULAR; INTRAVENOUS at 16:02

## 2021-04-30 RX ADMIN — FAMOTIDINE 20 MG: 20 TABLET ORAL at 05:25

## 2021-04-30 RX ADMIN — RISPERIDONE 2 MG: 1 TABLET, FILM COATED ORAL at 18:24

## 2021-04-30 RX ADMIN — FENTANYL CITRATE 200 MCG: 50 INJECTION, SOLUTION INTRAMUSCULAR; INTRAVENOUS at 11:42

## 2021-04-30 RX ADMIN — PROPOFOL 5 MCG/KG/MIN: 10 INJECTION, EMULSION INTRAVENOUS at 03:00

## 2021-04-30 RX ADMIN — DOCUSATE SODIUM 50 MG AND SENNOSIDES 8.6 MG 2 TABLET: 8.6; 5 TABLET, FILM COATED ORAL at 05:25

## 2021-04-30 RX ADMIN — ACETAMINOPHEN 650 MG: 325 TABLET, FILM COATED ORAL at 18:28

## 2021-04-30 RX ADMIN — POTASSIUM CHLORIDE 20 MEQ: 14.9 INJECTION, SOLUTION INTRAVENOUS at 19:56

## 2021-04-30 RX ADMIN — MIDAZOLAM HYDROCHLORIDE 2 MG: 1 INJECTION, SOLUTION INTRAMUSCULAR; INTRAVENOUS at 13:27

## 2021-04-30 RX ADMIN — DULOXETINE HYDROCHLORIDE 60 MG: 60 CAPSULE, DELAYED RELEASE ORAL at 05:25

## 2021-04-30 RX ADMIN — DIVALPROEX SODIUM 500 MG: 125 CAPSULE ORAL at 05:24

## 2021-04-30 RX ADMIN — CEFEPIME 1 G: 1 INJECTION, POWDER, FOR SOLUTION INTRAMUSCULAR; INTRAVENOUS at 22:25

## 2021-04-30 RX ADMIN — POTASSIUM CHLORIDE 20 MEQ: 14.9 INJECTION, SOLUTION INTRAVENOUS at 13:17

## 2021-04-30 RX ADMIN — POTASSIUM CHLORIDE 20 MEQ: 14.9 INJECTION, SOLUTION INTRAVENOUS at 00:58

## 2021-04-30 RX ADMIN — DIVALPROEX SODIUM 500 MG: 125 CAPSULE ORAL at 22:25

## 2021-04-30 RX ADMIN — DOCUSATE SODIUM 50 MG AND SENNOSIDES 8.6 MG 2 TABLET: 8.6; 5 TABLET, FILM COATED ORAL at 18:24

## 2021-04-30 RX ADMIN — RISPERIDONE 2 MG: 1 TABLET, FILM COATED ORAL at 05:25

## 2021-04-30 RX ADMIN — PROPOFOL 60 MCG/KG/MIN: 10 INJECTION, EMULSION INTRAVENOUS at 11:50

## 2021-04-30 RX ADMIN — MIDAZOLAM 6 MG/HR: 5 INJECTION INTRAMUSCULAR; INTRAVENOUS at 15:55

## 2021-04-30 RX ADMIN — CEFEPIME 1 G: 1 INJECTION, POWDER, FOR SOLUTION INTRAMUSCULAR; INTRAVENOUS at 13:16

## 2021-04-30 RX ADMIN — PROPOFOL 60 MCG/KG/MIN: 10 INJECTION, EMULSION INTRAVENOUS at 05:50

## 2021-04-30 RX ADMIN — DIVALPROEX SODIUM 500 MG: 125 CAPSULE ORAL at 13:16

## 2021-04-30 RX ADMIN — FENTANYL CITRATE 100 MCG: 50 INJECTION, SOLUTION INTRAMUSCULAR; INTRAVENOUS at 02:33

## 2021-04-30 RX ADMIN — DULOXETINE HYDROCHLORIDE 60 MG: 60 CAPSULE, DELAYED RELEASE ORAL at 18:24

## 2021-04-30 RX ADMIN — OXYCODONE HYDROCHLORIDE 10 MG: 100 SOLUTION ORAL at 18:49

## 2021-04-30 ASSESSMENT — PAIN DESCRIPTION - PAIN TYPE
TYPE: ACUTE PAIN

## 2021-04-30 ASSESSMENT — FIBROSIS 4 INDEX: FIB4 SCORE: 0.75

## 2021-04-30 NOTE — PROGRESS NOTES
Events noted  Chest tubes with serosanguinous drainage, no air leak  Continue to 40 cm suction  Will follow

## 2021-04-30 NOTE — PROGRESS NOTES
Pt continues to be tachycardic, hypertensive, tachypnic. Pt is maxed on Precedex. Pt is currently running Fentanyl continuous at 250 mcg/hr. Updated Dr. Sun. New orders received. Adjust the max rate of Fentanyl to 300 mcg/ hr. If pt unable to tolerate regimen, please discontinue Precedex and restart Propofol.

## 2021-04-30 NOTE — PROCEDURES
Procedures  Procedure Note    Date: 4/29/2021  Time: 2115    Procedure: Bronchoscopy    Indication: Acute hypoxia, mucous plugging    Procedure: After obtaining consent, a time-out was performed. Respiratory therapy and nursing at bedside throughout procedure. Patient provided sedation and analgesia throughout the procedure. Placed on full ventilator support with an FiO2 of 100% throughout the procedure. Using a fiberoptic bronchoscope, trachea entered via ET tube.  10 mL of local anesthetic sprayed at the gerard (2% lidocaine) achieving appropriate comfort level for patient. Airways visualized directly and the following intervention was performed: Suctioning of obstructing bloody mucous plugs. Findings as below. Patient tolerated procedure well without any difficulties and left in care of bedside nurse/RT.     Medications: Fentanyl, Precedex, lidocaine    Findings: Upper airway - Not visualized as bronchoscope passed through ETT.        Trachea to gerard -normal appearing mucosa without lesions or mass, ETT tip measured 0 cm from the gerard and was retracted 2 cm under bronchoscopic guidance.        R proximal and distal airways -inflamed appearing mucosa without mass/lesion/anatomic variance, secretions: Bloody mucus plugging in the bronchus intermedius and right lower lobe.  All bronchi sequentially lavaged until clear effluent returned.  No definitive source of bleeding localized.        L proximal and distal airways -inflamed appearing mucosa without mass/lesion/anatomic variance, secretions: Scant, clear/cloudy mucus.        Samples -none    Complications: None  CXR (if applicable): N/A    Benoit Loya, DO  Critical Care Medicine

## 2021-04-30 NOTE — PROGRESS NOTES
Pulmonary/Critical Care Medicine   Progress Note    Date of service: 4/29/2021  Time: 21:40 PM    Called to bedside for worsening hypoxia and hypotension.  Chest x-ray showing worsening right hemithorax pulmonary opacities.  Bronchoscopy completed with successful suctioning of bloody mucus plugs.  Sedation decreased and hypotension subsequently improved.  We will continue to monitor for now, titrating low-dose Levophed to maintain normal MAP and SBP.    Benoit Loya Jr., D.O.  [facility] Critical Care

## 2021-04-30 NOTE — PROGRESS NOTES
Infectious Disease Progress Note    Author: Eric Lowery M.D. Date & Time created: 4/30/2021  2:23 PM  Cefepime 4/23 to present.    PRIOR Rx:   Zosyn 4/22-4/23  Previous: Unasyn, Zosyn, Vanco, Daptomycin  Ceftaroline: start 4/13-4/15  Nafcillin 2g Q4 hrs 4/15-4/23 4/14: Unable to give name of previous NSG or neurologist. Seems confused, but able to say some sentences fluently.   4/15: Line cultures now growing MSSA. As well as from the blood. Repeat blood culture 4/13+ in both sets. Patient has worsening confusion. CSF fluid analyses suggest pleocytosis. Cultures are no growth from the CSF. Chest CT was ordered this morning indicating a loculated right pleural effusion as well as bilateral septic emboli. Dr. Mack spoke with Dr. Oh yesterday and no surgical intervention unless clinical deterioration.  4/16: Increased respiratory distress.  Tachypneic.  CT with loculated collection.  Dr Resendiz not available.  No thoracic surgeon available.  Plans to have pulmonary place CT.  Transferring to ICU.  4/17: Transferred to [facility] last night. Hgb dropped to 6.4 requiring PRBC transfusion. Requiring 2 pressors. Fio2 50%. Febrile 38.8. Pending OR decortication of empyema and hemothorax. [facility] at [facility] shows 8,371 WBC, ,000, 74% N  4/18: Chest tube was upsized by surgery yesterday, no decortication. WBC 22k. Fio2 40%. Vasopressin. Levophed down to 2mcg. Afebrile 24 hrs. Last fever 38.6 on 4/16.   4/19: Still on vent. Levo 3 mcg, vasopressin. Afebrile 72 hours. WBC 21k. BC 4/17 NGTD x 48 hours. Unable to do MRI brain given neurostimulator per RN.   4/20: Remaining afebrile. Hb 6.2 and undergoing transfusion today.WBC 24,100, 5% bands.1700 ml CT output. Copious bloody ET secretions. No pressors. Receiving dornase and TPA per CT. Right pleural effusion not large per CXR today.   4/21: WBC 31k. Bronchoscopy yesterday showing blood. Cultures sent. TPA on hold per RN due to arvind blood from chest  tube requiring PRBC transfusion for hgb 6. Pending chest CT today. Per , spinal stimulator is non-MRI compatible. Levophed 10mcg. 30% Fio2. Afebrile.   4/22: Fever 101.3 this am. WBC 20,300 down from 32,200 yesterday. BAL growing Klebsiella pneumoniae but susceptibility panel not set up until today. CTA of brain shows no lesion. CT of chest shows enlarging cavitary lung lesions bilat and large loculated new left pleural effusion and large hydropneumothorax on right. TPA & dornase infusions of right chest ended 4/20 after considerable bleeding requiring transfusion. Hb now down again to 6.8.  4/23: WBC 23k. Fio2 40%. Tmax 38.1. US of stiumlator shows small fluid collection but no drainable abscess. Pending MICAH today. Pending L chest tube placement  4/24: Continue fever 100.8-101.8. WBC 16,600. MICAH shows large vegetations on both tricuspid valve and mitral valve with mild TR & MR.  No aortic root abscess. Left chest tube placed yesterday yielding 8 ml of old bloody fluid. Bronch today revealed copious thick maroon secretions in distal trachea and both mainstem bronchi. On the right these were obstructive. Remaining off pressors. Last positive blood cultures (MSSA) were 4/16, negative 4/17.  4/25: Fever 100.6 this AM. wBC 13,300. Hb 6.6. CT: right hydropneumothorax increasing, right bronchopleural fistula, mediastinal shift ot the left , multiple cavitary pulmonary nodules, 3.1 cm left basilar mass, splenomegaly. CT of head shows dural thickening over the clivus. Lac+ GNR >100,000 col/ml growing from BAL of 4/24, presumed Klebsiella. Left peural fluid culture negative from 4/23.  4/26: Fever 100.4 last night. WBC 13,500. Hb 7.4. Plts 502,000. ESR >120. UA fairly clear except 30 mg% protein, 2-5 wbc and rare rbc. CXR unchanged except more prominent pulmonary edema. GNR in BAL may be a different species of Klebsiella, and resistant to ampicillin & tmp-sulfa; confirmation pending.  4/28: Fever 100.8 last night. WBC  27,700. Hb 6.6. Plts 482,000. Creat 0.19. Kleb pneum in BAL on 4/24 is resistant to ampicillin & tmp-sulfa but otherwise sensitive. O2 sat 96% on FIO2 30% now, PEEP 8. Has been re-evaluated by thoracic surgeon, Dr. Ganser, who believes she will not wean without decortication on the right. Surgery anticipated today.  4/29: S/P right thoracoscopy with decortication with cultures NG at 24 hrs.  4/30: Had a bronch due to hypoxia and hypotension. CXR with worsening right hemithorax pulmonary opacities. Bloody mucous plugs removed. Pressors started. Febrile this am. Tachy in 130s. Pressors just removed. Tolerating vent, but not a SBT candidate at the moment.    Interval History:  Past 24 hrs reviewed with RN    Labs Reviewed, Medications Reviewed, Radiology Reviewed, Wound Reviewed, Fluids Reviewed and GI Nutrition Reviewed    Review of Systems:  Review of Systems   Unable to perform ROS: Intubated       Physical Exam:  Physical Exam  Vitals and nursing note reviewed.   Constitutional:       General: She is not in acute distress.     Appearance: Normal appearance. She is obese. She is ill-appearing. She is not toxic-appearing or diaphoretic.   HENT:      Head: Normocephalic and atraumatic.   Cardiovascular:      Rate and Rhythm: Regular rhythm. Tachycardia present.      Heart sounds: No murmur.   Pulmonary:      Effort: Pulmonary effort is normal. No respiratory distress.      Breath sounds: No wheezing or rhonchi.      Comments: Full vent support.  Abdominal:      General: Abdomen is flat. There is no distension.      Palpations: Abdomen is soft.      Tenderness: There is no abdominal tenderness. There is no guarding.   Skin:     General: Skin is warm and dry.   Neurological:      Comments: Grimaces to pain   Psychiatric:      Comments: Sedated         Labs:  Recent Results (from the past 24 hour(s))   POTASSIUM SERUM (K)    Collection Time: 04/29/21  5:30 PM   Result Value Ref Range    Potassium 3.8 3.6 - 5.5 mmol/L    POTASSIUM SERUM (K)    Collection Time: 04/29/21 11:35 PM   Result Value Ref Range    Potassium 3.7 3.6 - 5.5 mmol/L   CBC with Differential    Collection Time: 04/30/21  3:44 AM   Result Value Ref Range    WBC 21.3 (H) 4.8 - 10.8 K/uL    RBC 2.38 (L) 4.20 - 5.40 M/uL    Hemoglobin 7.1 (L) 12.0 - 16.0 g/dL    Hematocrit 22.1 (L) 37.0 - 47.0 %    MCV 92.9 81.4 - 97.8 fL    MCH 29.8 27.0 - 33.0 pg    MCHC 32.1 (L) 33.6 - 35.0 g/dL    RDW 58.5 (H) 35.9 - 50.0 fL    Platelet Count 482 (H) 164 - 446 K/uL    MPV 9.1 9.0 - 12.9 fL    Neutrophils-Polys 90.40 (H) 44.00 - 72.00 %    Lymphocytes 3.50 (L) 22.00 - 41.00 %    Monocytes 4.40 0.00 - 13.40 %    Eosinophils 0.00 0.00 - 6.90 %    Basophils 0.00 0.00 - 1.80 %    Nucleated RBC 0.10 /100 WBC    Neutrophils (Absolute) 19.26 (H) 2.00 - 7.15 K/uL    Lymphs (Absolute) 0.75 (L) 1.00 - 4.80 K/uL    Monos (Absolute) 0.94 (H) 0.00 - 0.85 K/uL    Eos (Absolute) 0.00 0.00 - 0.51 K/uL    Baso (Absolute) 0.00 0.00 - 0.12 K/uL    NRBC (Absolute) 0.02 K/uL    Anisocytosis 1+     Macrocytosis 1+    Basic Metabolic Panel (BMP)    Collection Time: 04/30/21  3:44 AM   Result Value Ref Range    Sodium 133 (L) 135 - 145 mmol/L    Potassium 4.0 3.6 - 5.5 mmol/L    Chloride 105 96 - 112 mmol/L    Co2 24 20 - 33 mmol/L    Glucose 121 (H) 65 - 99 mg/dL    Bun 14 8 - 22 mg/dL    Creatinine 0.19 (L) 0.50 - 1.40 mg/dL    Calcium 7.9 (L) 8.5 - 10.5 mg/dL    Anion Gap 4.0 (L) 7.0 - 16.0   Triglyceride    Collection Time: 04/30/21  3:44 AM   Result Value Ref Range    Triglycerides 149 0 - 149 mg/dL   POCT arterial blood gas device results    Collection Time: 04/30/21  3:44 AM   Result Value Ref Range    Ph 7.421 7.400 - 7.500    Pco2 32.1 26.0 - 37.0 mmHg    Po2 79 64 - 87 mmHg    Tco2 22 20 - 33 mmol/L    S02 96 93 - 99 %    Hco3 20.9 17.0 - 25.0 mmol/L    BE -3 -4 - 3 mmol/L    Body Temp 38.2 C degrees    O2 Therapy 30 %    iPF Ratio 263     Ph Temp Ben 7.403 7.400 - 7.500    Pco2 Temp Co 33.8  26.0 - 37.0 mmHg    Po2 Temp Cor 86 64 - 87 mmHg    Specimen Arterial     DelSys Vent     End Tidal Carbon Dioxide 40 mmhg    Tidal Volume 300 mL    Peep End Expiratory Pressure 8 cmh20    Set Rate 24     Mode APV-CMV    DIFFERENTIAL MANUAL    Collection Time: 21  3:44 AM   Result Value Ref Range    Myelocytes 1.70 %    Manual Diff Status PERFORMED    PERIPHERAL SMEAR REVIEW    Collection Time: 21  3:44 AM   Result Value Ref Range    Peripheral Smear Review see below    PLATELET ESTIMATE    Collection Time: 21  3:44 AM   Result Value Ref Range    Plt Estimation Increased    MORPHOLOGY    Collection Time: 21  3:44 AM   Result Value Ref Range    RBC Morphology Present     Polychromia 1+    ESTIMATED GFR    Collection Time: 21  3:44 AM   Result Value Ref Range    GFR If African American >60 >60 mL/min/1.73 m 2    GFR If Non African American >60 >60 mL/min/1.73 m 2   POTASSIUM SERUM (K)    Collection Time: 21 11:49 AM   Result Value Ref Range    Potassium 3.5 (L) 3.6 - 5.5 mmol/L       Hemodynamics:  No data recorded.  Monitored Temp: 37.9 °C (100.2 °F)  Pulse  Av.7  Min: 40  Max: 149   Blood Pressure: 120/62, Arterial BP: (!) 163/78    Arterial Line 21 Radial (Active)   Site Assessment Clean;Dry;Intact 21 08   Line Status Pulsatile blood flow 21 08   Art Line Waveform Appropriate 21 08   Line Care Calibrated;Flushed per protocol;Leveled;Zeroed 21   Color/Movement/Sensation Pale fingers/toes 21 08   Perfusion Check Left 21 08   Dressing Type Tape 21 08   Dressing Status Clean;Dry;Intact 21 08   Dressing Intervention Dressing reinforced 21   Dressing Change Due 21 0800   Date Primary Tubing Changed 21 0800   Date IV Connector(s) Changed 21 0800   NEXT Primary Tubing Change  21   Cuff Pressure Correlates Yes 21 0800        Peripheral IV 22 G Left Upper arm (Active)   Site Assessment Clean;Dry;Intact 04/30/21 0800   Dressing Type Occlusive;Transparent 04/30/21 0800   Line Status Scrubbed the hub prior to access;Flushed;Saline locked 04/30/21 0800   Dressing Status Clean;Dry;Intact 04/30/21 0800   Dressing Intervention N/A 04/30/21 0800   Dressing Change Due 05/01/21 04/30/21 0800   Date Primary Tubing Changed 04/24/21 04/24/21 0800   Date Secondary Tubing Changed 04/20/21 04/20/21 2040   NEXT Primary Tubing Change  04/24/21 04/23/21 2000   NEXT Secondary Tubing Change  04/23/21 04/22/21 2000   Infiltration Grading ([facility]) 0 04/30/21 0800   Phlebitis Scale ([facility] Only) 0 04/30/21 0800       CVC Triple Lumen 04/21/21 Left Subclavian (Active)   Consider Removal of Femoral Line Not Applicable 04/29/21 2000   Site Assessment Clean;Dry;Intact 04/30/21 0800   Lumen 1 Status Scrubbed the hub prior to access;Infusing 04/30/21 0800   Lumen 3 Status Scrubbed the hub prior to access;Infusing 04/30/21 0800   Lumen 2 Status Scrubbed the hub prior to access;Infusing 04/30/21 0800   Dressing Type Biopatch;Occlusive;Transparent 04/30/21 0800   Dressing Status Clean;Dry;Intact 04/30/21 0800   Dressing Intervention Dressing reinforced 04/29/21 2000   Dressing Change Due 05/01/21 04/30/21 0800   Date Primary Tubing Changed 04/28/21 04/29/21 0800   Date Secondary Tubing Changed 04/28/21 04/28/21 2100   Date IV Connector(s) Changed 04/28/21 04/29/21 0800   NEXT Primary Tubing Change  05/01/21 04/29/21 2000   NEXT Secondary Tubing Change  04/29/21 04/28/21 2100   NEXT IV Connector(s) Change 05/01/21 04/29/21 2000   Line Necessity Assessed Antibiotic Therapy Greater than 7 Days;Vasopressor Therapy  04/30/21 0800     Wound:  @WOUNDLDA(4)@     Fluids:  Intake/Output       04/28/21 0700 - 04/29/21 0659 04/29/21 0700 - 04/30/21 0659 04/30/21 0700 - 05/01/21 0659      4910-9384 0732-5457 Total 7672-0065 3904-9649 Total 5907-2215 7050-4682 Total        Intake    P.O.  --  0 0  0  0 0  --  -- --    P.O. -- 0 0 0 0 0 -- -- --    I.V.  1105.2  363.7 1468.9  309.5  437.3 746.8  339.6  -- 339.6    Magnesium Sulfate Volume 50 -- 50 -- -- -- -- -- --    Precedex Volume -- -- -- 250.1 247.2 497.3 -- -- --    Ketamine Volume -- -- -- -- -- -- 4.6 -- 4.6    K+ Scale -- 8.5 8.5 -- 7.7 7.7 -- -- --    Propofol Volume 355.2 355.2 710.4 59.5 50.8 110.3 178.2 -- 178.2    Norepinephrine Volume -- -- -- -- 131.6 131.6 118.9 -- 118.9    IV Volume (Fentanyl) -- -- -- -- -- -- 38 -- 38    Volume (mL) (Lactated Ringers) 700 -- 700 -- -- -- -- -- --    Blood  739.8  -- 739.8  --  -- --  --  -- --    Volume (RELEASE RED BLOOD CELLS) 639.8 -- 639.8 -- -- -- -- -- --    Volume (RELEASE RED BLOOD CELLS) 100 -- 100 -- -- -- -- -- --    NG/GT  0  150 150  500  540 1040  240  -- 240    Intake (mL) (Enteral Tube 04/21/21 Dobhoff - Gastric Right nare) 0 150 150  240 -- 240    IV Piggyback  161.7  -- 161.7  200  200 400  --  -- --    Volume (mL) (potassium chloride (KCL) ivpb 10 mEq) -- -- -- 100 -- 100 -- -- --    Volume (mL) (potassium chloride (KCL) ivpb 10 mEq) -- -- -- 100 -- 100 -- -- --    Volume (mL) (potassium chloride (KCL) ivpb 10 mEq) -- -- -- -- 100 100 -- -- --    Volume (mL) (potassium chloride in water (KCL) ivpb **Administer in ICU only** 20 mEq) -- -- -- -- 100 100 -- -- --    Volume (mL) (potassium chloride (KCL) ivpb 10 mEq) 100 -- 100 -- -- -- -- -- --    Volume (mL) (potassium chloride (KCL) ivpb 10 mEq) 61.7 -- 61.7 -- -- -- -- -- --    Enteral  --  180 180  120  60 180  60  -- 60    Free Water / Tube Flush -- 180 180 120 60 180 60 -- 60    Total Intake 2006.7 693.7 2700.4 1129.5 1237.3 2366.8 639.6 -- 639.6       Output    Urine  1100  675 1775  890  605 1495  1225  -- 1225    Urine Void (mL) 600 -- 600 -- -- -- -- -- --    Output (mL) (Urethral Catheter)   1225 -- 1225    Drains  --  -- --  --  0 0  --  -- --    Residual Amount (mL)  (Discarded) (Enteral Tube 04/21/21 Dobhoff - Gastric Right nare) -- -- -- -- 0 0 -- -- --    Stool  --  -- --  --  -- --  --  -- --    Number of Times Stooled -- -- -- 2 x 0 x 2 x -- -- --    Emesis/NG output  0  -- 0  --  0 0  --  -- --    Output (mL) (Enteral Tube 04/21/21 Dobhoff - Gastric Right nare) 0 -- 0 -- 0 0 -- -- --    Chest Tube  148  272 420  150  90 240  40  -- 40    Output (mL) (Chest Tube 2 Right Midaxillary) 110 112 222 60 55 115 20 -- 20    Output (mL) (Chest Tube 2 Left Other (Comment)) 0 0 0 10 0 10 20 -- 20    Output (mL) (Chest Tube 1 Right Midaxillary;Pleural) 38 160 198 80 35 115 0 -- 0    Blood  200  -- 200  --  -- --  --  -- --    Est. Blood Loss 200 -- 200 -- -- -- -- -- --    Total Output 4155 158 1959 5928 405 4202 1265 -- 1265       Net I/O     558.7 -253.3 305.4 89.5 542.3 631.8 -625.4 -- -625.4        Weight: 82.8 kg (182 lb 8.7 oz)  GI/Nutrition:  Orders Placed This Encounter   Procedures   • Diet NPO     Standing Status:   Standing     Number of Occurrences:   1     Order Specific Question:   Restrict to:     Answer:   Strict [1]     Medications:  Current Facility-Administered Medications   Medication Last Admin   • propofol (DIPRIVAN) injection 20 mcg/kg/min at 04/30/21 1416   • ketamine 500 mg in  mL infusion (critical care only) 0.5 mg/kg/hr at 04/30/21 1200   • fentaNYL (SUBLIMAZE) 50 mcg/mL in 50mL 200 mcg at 04/30/21 1142   • potassium chloride in water (KCL) ivpb **Administer in ICU only** 20 mEq 20 mEq at 04/30/21 1317   • midazolam (Versed) 2 MG/2ML injection 2 mg 2 mg at 04/30/21 1327   • furosemide (LASIX) injection 20 mg 20 mg at 04/30/21 0519   • norepinephrine (Levophed) 8 mg in 250 mL NS infusion (premix) Stopped at 04/30/21 1245   • K+ Scale: Goal of 4.5 1 Each at 04/30/21 1200   • enoxaparin (LOVENOX) inj 40 mg Stopped at 04/30/21 0600   • ceFEPIme (MAXIPIME) 1 g in  mL IVPB 1 g at 04/30/21 1316   • Pharmacy Consult: Enteral tube insertion - review  meds/change route/product selection     • acetaminophen (Tylenol) tablet 650 mg 650 mg at 04/29/21 1933   • magnesium hydroxide (MILK OF MAGNESIA) suspension 30 mL      And   • senna-docusate (PERICOLACE or SENOKOT S) 8.6-50 MG per tablet 2 tablet 2 tablet at 04/30/21 0525    And   • polyethylene glycol/lytes (MIRALAX) PACKET 1 Packet      And   • bisacodyl (DULCOLAX) suppository 10 mg     • divalproex (DEPAKOTE SPRINKLE) capsule 500 mg 500 mg at 04/30/21 1316   • DULoxetine (CYMBALTA) capsule 60 mg 60 mg at 04/30/21 0525   • PARoxetine (PAXIL) tablet 10 mg 10 mg at 04/30/21 0525   • risperiDONE (RISPERDAL) tablet 2 mg 2 mg at 04/30/21 0525   • Respiratory Therapy Consult     • famotidine (PEPCID) tablet 20 mg 20 mg at 04/30/21 0525    Or   • famotidine (PEPCID) injection 20 mg 20 mg at 04/29/21 1718   • MD Alert...ICU Electrolyte Replacement per Pharmacy     • lidocaine (XYLOCAINE) 1 % injection 1-2 mL     • ipratropium-albuterol (DUONEB) nebulizer solution     • fentaNYL (SUBLIMAZE) injection 25 mcg      Or   • fentaNYL (SUBLIMAZE) injection 50 mcg 50 mcg at 04/29/21 0902    Or   • fentaNYL (SUBLIMAZE) injection 100 mcg 100 mcg at 04/30/21 0233     Medical Decision Making, by Problem:  Active Hospital Problems    Diagnosis    • *Acute bacterial endocarditis [I33.0]    • Acute respiratory failure with hypoxia (HCC) [J96.01]    • Empyema of right pleural space (HCC) [J86.9]    • Bacteremia due to methicillin susceptible Staphylococcus aureus (MSSA) [R78.81, B95.61]    • Acute encephalopathy [G93.40]    • Fever [R50.9]    • Pleural effusion, left [J90]    • Atelectasis [J98.11]    • CSF pleocytosis [D72.9]    • Pneumonia [J18.9]    • Pseudotumor cerebri [G93.2]    • S/P  shunt [Z98.2]    • Prolonged Q-T interval on ECG [R94.31]    • History of vasculitis [Z86.79]    • History of complicated migraines [G43.109]    • Hyponatremia [E87.1]    • Tachycardia [R00.0]    • H/O: CVA (cerebrovascular accident) [Z86.73]    •  Chronic pain syndrome [G89.4]    • Thrombocytopenia (HCC) [D69.6]    • Spinal cord stimulator status [Z96.89]    • Goals of care, counseling/discussion [Z71.89]    • Abnormal movement [G25.9]    • Pulmonary abscess (HCC) [J85.2]    • 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       - Acute left empyema s/p chest tube placement 4/23  -Multiple acute septic pulmonary emboli bilaterally  -Acute bilateral pneumonia due to above     --Pulmonary edema  -Pseudotumor cerebri with underlying  shunt: possible arachnoiditis  -Chronic migraine headaches  -History of autoimmune vasculitis  -Elevated alkaline phosphatase & bilirubin  -Acute encephalopathy due to sepsis      - anemia due to above      - New secondary Klebsiella pneumoniae pneumonia (presumed VAP)     Plan   Patient has evidence of disseminated staphylococcal sepsis source from her line with infection to her lungs, tricuspid & mitral valves and  bilat empyema requiring transfer to [facility] for decortication due to lack of CT surgery support at [facility].  Patient with bilateral cavitary lung abscess and empyema.   - Persistent fever likely attributable to empyema and multiple lung abscesses. Currently covered.  - Continue cefepime  - US of spinal stimulator shows ill defined fluid collection, but undrainable. NSGY does not recommend stiumulator removal due to instability      - Consider MRI of brain when clinically feasible. Spinal stimulator may prevent MRI.   - Central line exchanged  4/22  - Initial CSF cultures has pleocytosis, but no culture growth. CSF findings on 4/13:  63 wbc, 82% PMN, protein 97  - Chest tube upsized by surgery 4/17.   - Blood culture from 4/14 at [facility] positive. Admission blood cultures here on 4/16 both positive.  4/17 blood cultures NGTD  - original infected port removed 4/12  - vent management per pulmonology  - Tolerated the decortication. Cx NGTD.   - Had bloody mucous plugs removed by bronch  - Now off pressors   - No IV abx changes or further ID workup in the moment  - Discussion for trach I hear is pending decision.     Case and treatment reviewed with RN     35 min on floor with > 50% face to face view and 100% in direct patient care/counseling today.    Dr Lowery

## 2021-04-30 NOTE — PROGRESS NOTES
"Critical Care Progress Note    Date of admission  4/16/2021    Chief Complaint  50 y.o. female the past medical history of pseudotumor cerebri status post  shunt by Dr. Oh, chronic pain with history of placement of nerve stimulator, vasculitis, right anterior chest port for home IV meds with recurrent infection who presented 4/11/2021 with to [facility] with recurrent port infection and was initially treated with vancomycin and Zosyn and then changed to ceftaroline and subsequently switched to nafcillin when MSSA was identified.    Hospital Course  \"50 y.o. female the past medical history of pseudotumor cerebri status post  shunt by Dr. Oh, chronic pain with history of placement of nerve stimulator, vasculitis, right anterior chest port for home IV meds with recurrent infection who presented 4/11/2021 with to [facility] with recurrent port infection. Found to have MSSA bacteremia, endocarditis, septic pulmonary emboli/empyema/pneumatocele, and CSF pleocytosis concerning for  shunt involvement.  Seen by Dr. Oh, NSGY, at [facility] who did not recommend  shunt removal.  Seen by Dr. Vargas here for concern of fluid collection around her spinal stimulator and he recommended against removal. Remains intubated, encephalopathic.    4/16 admitted to ICU  4/17 VD 2, 2 FFP, pRBC overnight; new chest tube per gen surg  4/18 VD 3, TPa/Dornase with 1400 cc from R chest tube    4/26: R chest tube not functioning, d/c it.  broke his leg and going to OR today, so not available currently.  VD 11.  8/30%. RSBI immediately high on SBT attempt. Followed commands for RN  4/27: VD12. 8/30%. Not tolerating wean, high RSBI and tachycardic.   4/28: VD 13. VATS with Dr. Ganser.    4/29: VD 14. 8/30%. Still not tolerating wean d/t severe tachycardia just with SAT. Trialed precedex and she required fentanyl up to 600/hr to tolerate, so back on propofol.  4/30: VD 15. 8/30%. Try ketamine/versed as sedative. Severe tachycardia and HTN " with weaning down propofol. Plan for perc trach tomorrow      Interval Problem Update  Neuro: follows off sedation. Followed lowers and left upper, not right upper.  HR: SR ~100s, 200 with SAT  SBP: levo 11  Tmax: 38.1  GI: 4/29, TF at goal  I/O: 600 out  Lines: CT x3, L subclavian TLC, nevarez  Mobility: try EOB  Resp: VD 15, 24/300/8/30%. No SBT because didn't do well on SAT. 7.4/36/86   Vte: lovenox  PPI/H2:pepcid  Antibx: cefepime    HR continues to skyrocket, up to 200s, when on SAT so not getting SBTs  Trial ketamine infusion for sedation and re-try spont  Will likely need trach. I will try and discuss with family today  klebs in 4/28 pleural fluid  Lytes all OK      Review of Systems  Review of Systems   Unable to perform ROS: Intubated        Vital Signs for last 24 hours   Pulse:  [] 125  Resp:  [17-40] 26  BP: ()/(41-89) 108/62  SpO2:  [93 %-100 %] 96 %    Hemodynamic parameters for last 24 hours       Respiratory Information for the last 24 hours  Vent Mode: APVCMV  Rate (breaths/min): 24  Vt Target (mL): 300  PEEP/CPAP: 8  MAP: 14  Control VTE (exp VT): 519    Physical Exam   Physical Exam  Constitutional:       Appearance: She is ill-appearing.      Interventions: She is sedated and intubated.   HENT:      Mouth/Throat:      Mouth: Mucous membranes are moist.   Eyes:      Pupils: Pupils are equal, round, and reactive to light.   Cardiovascular:      Rate and Rhythm: Regular rhythm. Tachycardia present.      Heart sounds: Murmur present.   Pulmonary:      Effort: She is intubated.      Comments: R chest wall is nearly fixed with all respiratory motion in the L lung    Bilateral chest tubes  Abdominal:      General: Bowel sounds are normal.      Tenderness: There is no abdominal tenderness. There is no guarding.   Musculoskeletal:      Right lower leg: Edema present.      Left lower leg: Edema present.      Comments: Generalized anasarca   Skin:     General: Skin is warm and dry.   Neurological:       Comments: Deeply sedated   Psychiatric:      Comments: Unable to assess         Medications  Current Facility-Administered Medications   Medication Dose Route Frequency Provider Last Rate Last Admin   • fentaNYL (SUBLIMAZE) 50 mcg/mL in 50mL   Intravenous Continuous LEYLA Lamb Jr..O. 7 mL/hr at 04/30/21 0655 350 mcg/hr at 04/30/21 0655   • propofol (DIPRIVAN) injection  0-80 mcg/kg/min Intravenous Continuous Jeremy M Gonda, M.D. 29.7 mL/hr at 04/30/21 0550 60 mcg/kg/min at 04/30/21 0550   • dexmedetomidine (PRECEDEX) 400 mcg/100mL NS premix infusion  0.1-1.5 mcg/kg/hr Intravenous Continuous Leti Sun M.D.   Stopped at 04/30/21 0310   • furosemide (LASIX) injection 20 mg  20 mg Intravenous BID DIURETIC Leti Sun M.D.   20 mg at 04/30/21 0519   • norepinephrine (Levophed) 8 mg in 250 mL NS infusion (premix)  0-30 mcg/min Intravenous Continuous Benoit Loya Jr. D.O. 20.6 mL/hr at 04/30/21 0638 11 mcg/min at 04/30/21 0638   • LIDOCAINE HCL 1 % INJ SOLN            • K+ Scale: Goal of 4.5  1 Each Intravenous Q6HRS Benoit Loya Jr., D.O.   1 Each at 04/30/21 0600   • enoxaparin (LOVENOX) inj 40 mg  40 mg Subcutaneous DAILY Benoit Loya Jr., D.O.   Stopped at 04/30/21 0600   • ceFEPIme (MAXIPIME) 1 g in  mL IVPB  1 g Intravenous Q8HRS LEYLA Hinton.OFrederick   Stopped at 04/30/21 0548   • Pharmacy Consult: Enteral tube insertion - review meds/change route/product selection  1 Each Other PHARMACY TO DOSE Elliott Salvador M.D.       • acetaminophen (Tylenol) tablet 650 mg  650 mg Enteral Tube Q4HRS PRN Elliott Salvador M.D.   650 mg at 04/29/21 1933   • magnesium hydroxide (MILK OF MAGNESIA) suspension 30 mL  30 mL Enteral Tube QDAY PRN Elliott Salvador M.D.        And   • senna-docusate (PERICOLACE or SENOKOT S) 8.6-50 MG per tablet 2 tablet  2 tablet Enteral Tube BID Elliott Salvador M.D.   2 tablet at 04/30/21 0525    And   • polyethylene glycol/lytes (MIRALAX) PACKET 1 Packet  1  Packet Enteral Tube QDAY PRN Elliott Salvador M.D.        And   • bisacodyl (DULCOLAX) suppository 10 mg  10 mg Rectal QDAY PRN Elliott Salvador M.D.       • divalproex (DEPAKOTE SPRINKLE) capsule 500 mg  500 mg Enteral Tube Q8HRS Elliott Salvador M.D.   500 mg at 04/30/21 0524   • DULoxetine (CYMBALTA) capsule 60 mg  60 mg Enteral Tube BID Elliott Salvador M.D.   60 mg at 04/30/21 0525   • PARoxetine (PAXIL) tablet 10 mg  10 mg Enteral Tube QAM Elliott Salvador M.D.   10 mg at 04/30/21 0525   • risperiDONE (RISPERDAL) tablet 2 mg  2 mg Enteral Tube BID Elliott Salvador M.D.   2 mg at 04/30/21 0525   • Respiratory Therapy Consult   Nebulization Continuous RT Man Wahl M.D.       • famotidine (PEPCID) tablet 20 mg  20 mg Enteral Tube Q12HRS Man Wahl M.D.   20 mg at 04/30/21 0525    Or   • famotidine (PEPCID) injection 20 mg  20 mg Intravenous Q12HRS Man Wahl M.D.   20 mg at 04/29/21 1718   • MD Alert...ICU Electrolyte Replacement per Pharmacy   Other PHARMACY TO DOSE Man Wahl M.D.       • lidocaine (XYLOCAINE) 1 % injection 1-2 mL  1-2 mL Tracheal Tube Q30 MIN PRN Man Wahl M.D.       • ipratropium-albuterol (DUONEB) nebulizer solution  3 mL Nebulization Q2HRS PRN (RT) Man Wahl M.D.       • fentaNYL (SUBLIMAZE) injection 25 mcg  25 mcg Intravenous Q HOUR PRN Man Wahl M.D.        Or   • fentaNYL (SUBLIMAZE) injection 50 mcg  50 mcg Intravenous Q HOUR PRN Man Wahl M.D.   50 mcg at 04/29/21 0902    Or   • fentaNYL (SUBLIMAZE) injection 100 mcg  100 mcg Intravenous Q HOUR PRN Man Wahl M.D.   100 mcg at 04/30/21 0233       Fluids    Intake/Output Summary (Last 24 hours) at 4/30/2021 0703  Last data filed at 4/30/2021 0600  Gross per 24 hour   Intake 2266.79 ml   Output 1735 ml   Net 531.79 ml       Laboratory  Recent Labs     04/28/21  1822 04/29/21  0415 04/30/21  0344   ISTATAPH 7.299* 7.397* 7.421   ISTATAPCO2 48.8* 41.5* 32.1    ISTATAPO2 90* 111* 79   ISTATATCO2 25 27 22   NUQMXHC6VJR 96 98 96   ISTATARTHCO3 24.0 25.5* 20.9   ISTATARTBE -3 1 -3   ISTATTEMP 37.0 C 37.0 C 38.2 C   ISTATFIO2 70 40 30   ISTATSPEC Arterial Arterial Arterial   ISTATAPHTC 7.299* 7.397* 7.403   [id number] 90* 111* 86         Recent Labs     04/28/21  0427 04/28/21  1215 04/29/21  0355 04/29/21  1245 04/29/21  1730 04/29/21  2335 04/30/21  0344   SODIUM 131*  --  133*  --   --   --  133*   POTASSIUM 3.9   < > 4.2   < > 3.8 3.7 4.0   CHLORIDE 99  --  102  --   --   --  105   CO2 24  --  24  --   --   --  24   BUN 11  --  10  --   --   --  14   CREATININE 0.19*  --  <0.17*  --   --   --  0.19*   MAGNESIUM 1.8  --   --   --   --   --   --    CALCIUM 8.2*  --  7.8*  --   --   --  7.9*    < > = values in this interval not displayed.     Recent Labs     04/28/21 0427 04/29/21 0355 04/30/21  0344   GLUCOSE 91 94 121*     Recent Labs     04/28/21 0427 04/29/21 0355 04/30/21  0344   WBC 27.7* 21.3* 21.3*   NEUTSPOLYS 84.80* 92.90* 90.40*   LYMPHOCYTES 1.70* 2.70* 3.50*   MONOCYTES 7.60 3.50 4.40   EOSINOPHILS 0.00 0.00 0.00   BASOPHILS 1.70 0.00 0.00     Recent Labs     04/28/21 0427 04/28/21 0427 04/28/21  1855 04/29/21 0355 04/30/21  0344   RBC 2.18*  --   --  2.63* 2.38*   HEMOGLOBIN 6.6*   < > 8.8* 8.0* 7.1*   HEMATOCRIT 20.9*  --   --  24.1* 22.1*   PLATELETCT 482*  --   --  402 482*    < > = values in this interval not displayed.       Imaging  X-Ray:  I have personally reviewed the images and compared with prior images.  CT:    Reviewed  Echo:   Reviewed    Assessment/Plan  * Acute bacterial endocarditis- (present on admission)  Assessment & Plan  MSSA  Cultures cleared 4/17  Infected port removed at [facility]   shunt and spinal stimulator could be seeded, NSGY has consulted on both and don't recommend removal  MV and TV vegetations with septic pulmonary emboli  Extended cefepime  ID consulting  No evidence based surgical indications (valvular CHF, difficult  pathogen, etc) so no cardiovascular surgery consult is necessary at this time      Bacteremia due to methicillin susceptible Staphylococcus aureus (MSSA)- (present on admission)  Assessment & Plan  Source presumed right anterior chest line/port with endocarditis  See discussion of endocarditis above        Empyema of right pleural space (HCC)- (present on admission)  Assessment & Plan  R empyema due to septic pulmonary emboli  Chest tube placement 4/16  At [facility], Upsized 4/18 Dr Benedict, stopped functioning so d/c'ed 4/26  Received TPA/dornase from 4/18-20.  Stopped b/c Hgb dropped requiring transfusion  S/p VATS and decortication 4/28  May need another intervention in future, per Dr. Ganser  Culture from 4/28 pleural fluid growing klebsiella        Acute respiratory failure with hypoxia (HCC)- (present on admission)  Assessment & Plan  Intubated 4/15 for respiratory failure at [facility]  Due to hydro/pneumo, trapped lung, lung abscesses  Lung protective ventilation strategies  Appropriate vent bundles  SAT/SBT  If unable to liberate in the next couple days, will need trach for long term support        Tachycardia  Assessment & Plan  Sinus  Gets severely tachycardiac with any attempt at sedation wean  This is likely a combination of prolonged use of sedatives, deconditioning, active cardiopulmonary illness.   Trial a different sedation regimen-ketamine/versed to try and allow her to wean from vent  Consider adding beta blockade, but I don't want to block appropriate sinus tach    Acute encephalopathy- (present on admission)  Assessment & Plan  Multifactorial including septic and metabolic  Status post lumbar puncture with cytosis, negative Gram stain and cultures NGTD   shunt for pseudotumor cerebri  Not candidate for MRI d/t spinal stimulator  Ct repeat no intracranial abnormalities on 4/21  EEG with encephalopathy and no seizures  Minimize sedation as able  Her problem list includes autoimmune cerebritis.  Need to  clarify the history of this, as I don't see anything in her prior d/c summaries about an autoimmune cerebritis    Pleural effusion, left  Assessment & Plan  Loculated fluid  Probably parapneumonic fluid from abscesses  CT guided chest tube placed 4/23, fluid sent  Lymph predominant, NGTD  TPA/dornase one dose on 4/24.  Hold d/t dropping Hgb  CT chest 4/25 with resolution of fluid  CT clamped 4/29-no significant accumulation of fluid so d/c 4/30    Fever  Assessment & Plan  Stable fever curve  R pleural space has trapped lung so impossible to completely clear/get source control      Pneumonia  Assessment & Plan  MSSA septic emboli with empyema   VAE  BAL 4/20 klebsiella pneum  BAL 4/24 still heavy growth of klebsiella  4/28 OR pleural fluid specimen growing klebsiella.   On cefepime    CSF pleocytosis- (present on admission)  Assessment & Plan  Status post lumbar puncture 4/13  WBC 53, 82% polys, Glucose 47, protein 97 Gram stain negative and culture negative at [facility]  Neurosurgery aware,  shunt in place  MRI brain requested by neurosurgery at [facility], not candidate d/t stimulator  Repeat ct no embolic lesions  cefepime for CNS penetration    Acute blood loss anemia  Assessment & Plan  Hgb dropped again after TPA/dornase on 4/25  No clear source of bleed  D/c TPA/dornase  Hgb stable  Transfuse >7    S/P  shunt- (present on admission)  Assessment & Plan  History of pseudotumor cerebri  History of  shunt by Dr. Oh  Could be seeded by MSSA, Dr. Oh consulted at [facility], recommended MRI but unable given her spinal stimulator  I sent a message to Dr. Oh on 4/27, but he is not on call so may not be available.  She will need a long-term plan for the possibly infected  shunt    Goals of care, counseling/discussion  Assessment & Plan  Palliative is consulting  I spoke with Benoit on 4/27. I explained my concern that even if Enedelia were to survive this her QOL would likely be quite a bit worse.  She won't be able to  have another port and was receiving IV meds for HA 3-4 times weekly.  She also could have worse headaches after this episode of meningitis.  He asked if she could have brain damage, and I explained that was possible but hard to know right now.  He says part of him thinks Enedelia would want to keep fighting and part of him thinks she would say to stop aggressive care because her QOL was already very limited by her headaches. Continue aggressive care for now.    Spinal cord stimulator status  Assessment & Plan  Patient's stimulator is Nuvectra. It is FDA approved as MRI compatible, but the company has gone out of business, so there is no support team to assist with MRI.  Thus, if MRI were performed, our radiologists would need to protocol it correctly to manage the stimulator. The former rep from [employer] is Andre, [phone].  Information obtained from Dr. Mahoney's office, [phone].    Thrombocytosis (HCC)  Assessment & Plan  Probably reactive    Anasarca  Assessment & Plan  Mild diffuse peripheral edema  Probably due to critical illness, hypoalbuminemia, prior volume resuscitation  Gentle diuresis    Trapped lung  Assessment & Plan  Decortication 4/28  Continue chest tubes to suction and hope lungs expand    Pulmonary abscess (HCC)  Assessment & Plan  Septic emboli from TV endocarditis    Anemia  Assessment & Plan  Has required a few transfusions  No obvious source of bleed   Transfuse <7    Septic shock (HCC)- (present on admission)  Assessment & Plan  Shock resolved    Hypokalemia  Assessment & Plan  K-scale    History of vasculitis- (present on admission)  Assessment & Plan  Monitor    Prolonged Q-T interval on ECG- (present on admission)  Assessment & Plan  Avoid QT prolonging agents as able  Optimize electrolytes  Cardiac monitoring  EKG    History of complicated migraines- (present on admission)  Assessment & Plan  Home regimen    Hyponatremia  Assessment & Plan  Mild, monitor    Abnormal LFTs  Assessment &  Plan  Chronic alk phos elevation  GGT high c/w liver source  Outpatient workup    H/O: CVA (cerebrovascular accident)- (present on admission)  Assessment & Plan  Monitor    Chronic pain syndrome- (present on admission)  Assessment & Plan  Chronic back pain and intractable headaches  History of nerve stimulator   Dr Vargas consulted, no plan to remove stimulator due to her clinical instability     VTE:  lovenox  Ulcer: H2 Antagonist  Lines: Central Line  Ongoing indication addressed and Lopez Catheter  Ongoing indication addressed    I have performed a physical exam and reviewed and updated ROS and Plan today (4/30/2021). In review of yesterday's note (4/29/2021), there are no changes except as documented above.       Discussed patient condition and risk of morbidity and/or mortality with Family, RN, RT, Pharmacy, Dietary, Code status disscussed and Charge nurse / hot rounds.      The patient remains critically ill requiring mechanical ventilation, difficulty weaning from vent, complicated pleural space.  Critical care time = 40 minutes in directly providing and coordinating critical care and extensive data review.  No time overlap and excludes procedures

## 2021-04-30 NOTE — CARE PLAN
Problem: Infection  Goal: Will remain free from infection  Outcome: PROGRESSING SLOWER THAN EXPECTED  Intervention: Implement standard precautions and perform hand washing before and after patient contact  Note: Standard precautions in place, hand washing done prior to and after pt care.      Problem: Safety - Medical Restraint  Goal: Remains free of injury from restraints (Restraint for Interference with Medical Device)  Description: INTERVENTIONS:  1. Determine that other, less restrictive measures have been tried or would not be effective before applying the restraint  2. Evaluate the patient's condition at the time of restraint application  3. Inform patient/family regarding the reason for restraint  4. Q2H: Monitor safety, psychosocial status, comfort, nutrition and hydration  Outcome: PROGRESSING AS EXPECTED  Flowsheets (Taken 4/29/2021 4697)  Addressed this shift: Remains free of injury from restraints (restraint for interference with medical device): Every 2 hours: Monitor safety, psychosocial status, comfort, nutrition and hydration  Note: Pt assessed q 2 hours, ROM, dry sheets, catheter in place and tube feed nutrition.   Goal: Free from restraint(s) (Restraint for Interference with Medical Device)  Description: INTERVENTIONS:  1. ONCE/SHIFT or MINIMUM Q12H: Assess and document the continuing need for restraints  2. Q24H: Continued use of restraint requires LIP to perform face to face examination and written order  3. Identify and implement measures to help patient regain control  Outcome: PROGRESSING AS EXPECTED

## 2021-04-30 NOTE — DIETARY
"Nutrition Services: Weekly TF update  Day 14 of admit.  Enedelia Pang is a 50 y.o. female with admitting DX of Empyema, sepsis due to port line infection, endocarditis     Current TF regimen via duodenal Cortrak (with propofol): Impact Peptide 1.5 @ 40 ml/hr, providing 1440 kcal (+Kcal from propofol), 90 grams protein, and 739 ml free water per day.   Propofol currently @ 40 mcg/kg/min (19.8 ml/hr = 523 kcal/day)    Assessment:  Height: 160 cm (5' 2.99\")  Weight: 82.6 kg (182 lb 1.6 oz)  Weight to Use in Calculations: 75 kg (165 lb 5.5 oz)-- Initial weight on admit (4/16). Pt has significant edema per visual observation and D/w RN. BMI 29.29   Ideal Body Weight: 52.2 kg (115 lb)     Estimated needs:  REE per MSJ x1.1-1.2 = 1568-3219 kcal/day  RMR per PSU (vent L/min 7.2, T max/24 hours 38.4) = 1719 kcal/day  9926-5811 kcal/day = 20-23 kcal/day  1.2-1.5 grams protein/kg =  grams protein/day      Evaluation:   1. Pt remains on vent support, day 15.  2. TF @ goal. TF + propofol is exceeding daily caloric goals. However, it does appear that propofol infusion is titrating down per MAR (ketamine added today).  3. Reviewed labs.   4. Meds include lasix, electrolyte replacement, ketamine, propofol, and bowel regimen.  5. Last BM 4/29  6. No wounds to follow.  7. Low-volume, high-protein TF formula is most appropriate to meet nutritional needs.   8. Will continue current rate with propofol with the assumption that propofol infusion is titrating down.      Malnutrition Risk: None identified @ this time.      Recommendations/Plan:  1. With propofol: Impact Peptide 1.5 @ 40 ml/hr to provide 1440 kcal (+Kcal from propofol), 90 grams protein, and 739 ml free water per day.  2. Without propofol: Impact Peptide 1.5 @ 45 ml/hr to provide 1620 kcal, 102 grams protein, and 832 ml free water per day.  3. Fluids per MD.     RD following.     "

## 2021-04-30 NOTE — PROGRESS NOTES
Pt's pressures fluctuating from SBP .  updated  Orders: chest Xray, see MAR for meds and Bronch procedure.

## 2021-04-30 NOTE — CARE PLAN
Problem: Ventilation Defect:  Goal: Ability to achieve and maintain unassisted ventilation or tolerate decreased levels of ventilator support  Outcome: NOT MET   Vent Day # 15  24/300/+8 30% FIO2

## 2021-04-30 NOTE — PROGRESS NOTES
Received bedside report assumed care of pt. Pt resting in bed.Bed alarm on and in lowest position. Restraints verified. Drips verified. VS stable. Updated whiteboard in room.

## 2021-04-30 NOTE — FLOWSHEET NOTE
04/30/21 0617   Spontaneous Breathing Trial (SBT)   Safety screen spontaneous breathing trial (SBT) Failed SAT - Do NOT begin SBT

## 2021-05-01 ENCOUNTER — APPOINTMENT (OUTPATIENT)
Dept: RADIOLOGY | Facility: MEDICAL CENTER | Age: 51
DRG: 003 | End: 2021-05-01
Attending: INTERNAL MEDICINE
Payer: MEDICARE

## 2021-05-01 ENCOUNTER — ANCILLARY PROCEDURE (OUTPATIENT)
Dept: CARDIAC CATH/INVASIVE PROCEDURES | Facility: MEDICAL CENTER | Age: 51
DRG: 003 | End: 2021-05-01
Attending: INTERNAL MEDICINE
Payer: MEDICARE

## 2021-05-01 LAB
ANION GAP SERPL CALC-SCNC: 5 MMOL/L (ref 7–16)
ANISOCYTOSIS BLD QL SMEAR: ABNORMAL
BACTERIA SPEC ANAEROBE CULT: NORMAL
BASE EXCESS BLDA CALC-SCNC: 1 MMOL/L (ref -4–3)
BASOPHILS # BLD AUTO: 1.7 % (ref 0–1.8)
BASOPHILS # BLD: 0.29 K/UL (ref 0–0.12)
BODY TEMPERATURE: NORMAL DEGREES
BREATHS SETTING VENT: 24
BUN SERPL-MCNC: 12 MG/DL (ref 8–22)
CALCIUM SERPL-MCNC: 8.1 MG/DL (ref 8.5–10.5)
CHLORIDE SERPL-SCNC: 104 MMOL/L (ref 96–112)
CO2 BLDA-SCNC: 25 MMOL/L (ref 20–33)
CO2 SERPL-SCNC: 25 MMOL/L (ref 20–33)
CREAT SERPL-MCNC: <0.17 MG/DL (ref 0.5–1.4)
DELSYS IDSYS: NORMAL
EOSINOPHIL # BLD AUTO: 0 K/UL (ref 0–0.51)
EOSINOPHIL NFR BLD: 0 % (ref 0–6.9)
ERYTHROCYTE [DISTWIDTH] IN BLOOD BY AUTOMATED COUNT: 58.6 FL (ref 35.9–50)
GLUCOSE SERPL-MCNC: 92 MG/DL (ref 65–99)
HCO3 BLDA-SCNC: 24.4 MMOL/L (ref 17–25)
HCT VFR BLD AUTO: 22.5 % (ref 37–47)
HGB BLD-MCNC: 7.1 G/DL (ref 12–16)
HOROWITZ INDEX BLDA+IHG-RTO: 253 MM[HG]
HYPOCHROMIA BLD QL SMEAR: ABNORMAL
LYMPHOCYTES # BLD AUTO: 1.44 K/UL (ref 1–4.8)
LYMPHOCYTES NFR BLD: 8.6 % (ref 22–41)
MACROCYTES BLD QL SMEAR: ABNORMAL
MAGNESIUM SERPL-MCNC: 1.5 MG/DL (ref 1.5–2.5)
MANUAL DIFF BLD: NORMAL
MCH RBC QN AUTO: 29.5 PG (ref 27–33)
MCHC RBC AUTO-ENTMCNC: 31.6 G/DL (ref 33.6–35)
MCV RBC AUTO: 93.4 FL (ref 81.4–97.8)
METAMYELOCYTES NFR BLD MANUAL: 0.9 %
MODE IMODE: NORMAL
MONOCYTES # BLD AUTO: 1.88 K/UL (ref 0–0.85)
MONOCYTES NFR BLD AUTO: 11.2 % (ref 0–13.4)
MORPHOLOGY BLD-IMP: NORMAL
MYELOCYTES NFR BLD MANUAL: 2.6 %
NEUTROPHILS # BLD AUTO: 12.45 K/UL (ref 2–7.15)
NEUTROPHILS NFR BLD: 74.1 % (ref 44–72)
NRBC # BLD AUTO: 0.02 K/UL
NRBC BLD-RTO: 0.1 /100 WBC
O2/TOTAL GAS SETTING VFR VENT: 30 %
PCO2 BLDA: 33.6 MMHG (ref 26–37)
PCO2 TEMP ADJ BLDA: 35.4 MMHG (ref 26–37)
PEEP END EXPIRATORY PRESSURE IPEEP: 8 CMH20
PH BLDA: 7.47 [PH] (ref 7.4–7.5)
PH TEMP ADJ BLDA: 7.45 [PH] (ref 7.4–7.5)
PHOSPHATE SERPL-MCNC: 2.7 MG/DL (ref 2.5–4.5)
PLATELET # BLD AUTO: 424 K/UL (ref 164–446)
PLATELET BLD QL SMEAR: NORMAL
PMV BLD AUTO: 8.8 FL (ref 9–12.9)
PO2 BLDA: 76 MMHG (ref 64–87)
PO2 TEMP ADJ BLDA: 82 MMHG (ref 64–87)
POLYCHROMASIA BLD QL SMEAR: NORMAL
POTASSIUM SERPL-SCNC: 3.7 MMOL/L (ref 3.6–5.5)
POTASSIUM SERPL-SCNC: 3.9 MMOL/L (ref 3.6–5.5)
PROMYELOCYTES NFR BLD MANUAL: 0.9 %
RBC # BLD AUTO: 2.41 M/UL (ref 4.2–5.4)
RBC BLD AUTO: PRESENT
SAO2 % BLDA: 96 % (ref 93–99)
SIGNIFICANT IND 70042: NORMAL
SITE SITE: NORMAL
SODIUM SERPL-SCNC: 134 MMOL/L (ref 135–145)
SOURCE SOURCE: NORMAL
SPECIMEN DRAWN FROM PATIENT: NORMAL
TIDAL VOLUME IVT: 300 ML
WBC # BLD AUTO: 16.8 K/UL (ref 4.8–10.8)

## 2021-05-01 PROCEDURE — 700111 HCHG RX REV CODE 636 W/ 250 OVERRIDE (IP): Performed by: INTERNAL MEDICINE

## 2021-05-01 PROCEDURE — 0B938ZZ DRAINAGE OF RIGHT MAIN BRONCHUS, VIA NATURAL OR ARTIFICIAL OPENING ENDOSCOPIC: ICD-10-PCS | Performed by: INTERNAL MEDICINE

## 2021-05-01 PROCEDURE — 31600 PLANNED TRACHEOSTOMY: CPT

## 2021-05-01 PROCEDURE — 700102 HCHG RX REV CODE 250 W/ 637 OVERRIDE(OP): Performed by: INTERNAL MEDICINE

## 2021-05-01 PROCEDURE — 99291 CRITICAL CARE FIRST HOUR: CPT | Mod: 25 | Performed by: INTERNAL MEDICINE

## 2021-05-01 PROCEDURE — 700105 HCHG RX REV CODE 258: Performed by: INTERNAL MEDICINE

## 2021-05-01 PROCEDURE — 0B113F4 BYPASS TRACHEA TO CUTANEOUS WITH TRACHEOSTOMY DEVICE, PERCUTANEOUS APPROACH: ICD-10-PCS | Performed by: SURGERY

## 2021-05-01 PROCEDURE — 700111 HCHG RX REV CODE 636 W/ 250 OVERRIDE (IP): Mod: JG | Performed by: INTERNAL MEDICINE

## 2021-05-01 PROCEDURE — 36573 INSJ PICC RS&I 5 YR+: CPT

## 2021-05-01 PROCEDURE — 37799 UNLISTED PX VASCULAR SURGERY: CPT

## 2021-05-01 PROCEDURE — 700101 HCHG RX REV CODE 250: Performed by: INTERNAL MEDICINE

## 2021-05-01 PROCEDURE — A9270 NON-COVERED ITEM OR SERVICE: HCPCS | Performed by: INTERNAL MEDICINE

## 2021-05-01 PROCEDURE — 83735 ASSAY OF MAGNESIUM: CPT

## 2021-05-01 PROCEDURE — 80048 BASIC METABOLIC PNL TOTAL CA: CPT

## 2021-05-01 PROCEDURE — 02HV33Z INSERTION OF INFUSION DEVICE INTO SUPERIOR VENA CAVA, PERCUTANEOUS APPROACH: ICD-10-PCS | Performed by: INTERNAL MEDICINE

## 2021-05-01 PROCEDURE — 0B958ZZ DRAINAGE OF RIGHT MIDDLE LOBE BRONCHUS, VIA NATURAL OR ARTIFICIAL OPENING ENDOSCOPIC: ICD-10-PCS | Performed by: INTERNAL MEDICINE

## 2021-05-01 PROCEDURE — 94799 UNLISTED PULMONARY SVC/PX: CPT

## 2021-05-01 PROCEDURE — 85027 COMPLETE CBC AUTOMATED: CPT

## 2021-05-01 PROCEDURE — B548ZZA ULTRASONOGRAPHY OF SUPERIOR VENA CAVA, GUIDANCE: ICD-10-PCS | Performed by: INTERNAL MEDICINE

## 2021-05-01 PROCEDURE — 31645 BRNCHSC W/THER ASPIR 1ST: CPT | Performed by: INTERNAL MEDICINE

## 2021-05-01 PROCEDURE — 93308 TTE F-UP OR LMTD: CPT

## 2021-05-01 PROCEDURE — 84100 ASSAY OF PHOSPHORUS: CPT

## 2021-05-01 PROCEDURE — 82803 BLOOD GASES ANY COMBINATION: CPT

## 2021-05-01 PROCEDURE — P9047 ALBUMIN (HUMAN), 25%, 50ML: HCPCS | Mod: JG | Performed by: INTERNAL MEDICINE

## 2021-05-01 PROCEDURE — 770022 HCHG ROOM/CARE - ICU (200)

## 2021-05-01 PROCEDURE — 94003 VENT MGMT INPAT SUBQ DAY: CPT

## 2021-05-01 PROCEDURE — 302977 HCHG BRONCHOSCOPY PROC-THERAPEUTIC

## 2021-05-01 PROCEDURE — 0B968ZZ DRAINAGE OF RIGHT LOWER LOBE BRONCHUS, VIA NATURAL OR ARTIFICIAL OPENING ENDOSCOPIC: ICD-10-PCS | Performed by: INTERNAL MEDICINE

## 2021-05-01 PROCEDURE — 71045 X-RAY EXAM CHEST 1 VIEW: CPT

## 2021-05-01 PROCEDURE — 85007 BL SMEAR W/DIFF WBC COUNT: CPT

## 2021-05-01 PROCEDURE — 99152 MOD SED SAME PHYS/QHP 5/>YRS: CPT

## 2021-05-01 RX ORDER — ALBUMIN (HUMAN) 12.5 G/50ML
50 SOLUTION INTRAVENOUS ONCE
Status: COMPLETED | OUTPATIENT
Start: 2021-05-01 | End: 2021-05-01

## 2021-05-01 RX ORDER — PHENYLEPHRINE HCL IN 0.9% NACL 0.5 MG/5ML
100-250 SYRINGE (ML) INTRAVENOUS
Status: COMPLETED | OUTPATIENT
Start: 2021-05-01 | End: 2021-05-01

## 2021-05-01 RX ORDER — POTASSIUM CHLORIDE 14.9 MG/ML
20 INJECTION INTRAVENOUS ONCE
Status: COMPLETED | OUTPATIENT
Start: 2021-05-01 | End: 2021-05-01

## 2021-05-01 RX ORDER — ROCURONIUM BROMIDE 10 MG/ML
0.1 INJECTION, SOLUTION INTRAVENOUS
Status: COMPLETED | OUTPATIENT
Start: 2021-05-01 | End: 2021-05-01

## 2021-05-01 RX ORDER — MIDAZOLAM HYDROCHLORIDE 1 MG/ML
6 INJECTION INTRAMUSCULAR; INTRAVENOUS
Status: COMPLETED | OUTPATIENT
Start: 2021-05-01 | End: 2021-05-01

## 2021-05-01 RX ORDER — POTASSIUM CHLORIDE 7.45 MG/ML
10 INJECTION INTRAVENOUS ONCE
Status: COMPLETED | OUTPATIENT
Start: 2021-05-01 | End: 2021-05-01

## 2021-05-01 RX ORDER — ROCURONIUM BROMIDE 10 MG/ML
41.7 INJECTION, SOLUTION INTRAVENOUS ONCE
Status: COMPLETED | OUTPATIENT
Start: 2021-05-01 | End: 2021-05-01

## 2021-05-01 RX ORDER — MAGNESIUM SULFATE HEPTAHYDRATE 40 MG/ML
2 INJECTION, SOLUTION INTRAVENOUS ONCE
Status: COMPLETED | OUTPATIENT
Start: 2021-05-01 | End: 2021-05-01

## 2021-05-01 RX ADMIN — OXYCODONE HYDROCHLORIDE 10 MG: 100 SOLUTION ORAL at 21:24

## 2021-05-01 RX ADMIN — POTASSIUM CHLORIDE 10 MEQ: 7.46 INJECTION, SOLUTION INTRAVENOUS at 08:19

## 2021-05-01 RX ADMIN — DIVALPROEX SODIUM 500 MG: 125 CAPSULE ORAL at 21:24

## 2021-05-01 RX ADMIN — RISPERIDONE 2 MG: 1 TABLET, FILM COATED ORAL at 17:14

## 2021-05-01 RX ADMIN — CEFEPIME 1 G: 1 INJECTION, POWDER, FOR SOLUTION INTRAMUSCULAR; INTRAVENOUS at 21:24

## 2021-05-01 RX ADMIN — ROCURONIUM BROMIDE 41.7 MG: 10 INJECTION, SOLUTION INTRAVENOUS at 11:41

## 2021-05-01 RX ADMIN — FAMOTIDINE 20 MG: 20 TABLET ORAL at 17:14

## 2021-05-01 RX ADMIN — OXYCODONE HYDROCHLORIDE 10 MG: 100 SOLUTION ORAL at 15:35

## 2021-05-01 RX ADMIN — FAMOTIDINE 20 MG: 20 TABLET ORAL at 06:30

## 2021-05-01 RX ADMIN — DOCUSATE SODIUM 50 MG AND SENNOSIDES 8.6 MG 2 TABLET: 8.6; 5 TABLET, FILM COATED ORAL at 06:30

## 2021-05-01 RX ADMIN — ALTEPLASE 1 MG: 2.2 INJECTION, POWDER, LYOPHILIZED, FOR SOLUTION INTRAVENOUS at 05:08

## 2021-05-01 RX ADMIN — OXYCODONE HYDROCHLORIDE 10 MG: 100 SOLUTION ORAL at 02:31

## 2021-05-01 RX ADMIN — MAGNESIUM SULFATE 2 G: 2 INJECTION INTRAVENOUS at 13:38

## 2021-05-01 RX ADMIN — RISPERIDONE 2 MG: 1 TABLET, FILM COATED ORAL at 06:30

## 2021-05-01 RX ADMIN — DULOXETINE HYDROCHLORIDE 60 MG: 60 CAPSULE, DELAYED RELEASE ORAL at 06:31

## 2021-05-01 RX ADMIN — MIDAZOLAM HYDROCHLORIDE 6 MG: 1 INJECTION, SOLUTION INTRAMUSCULAR; INTRAVENOUS at 11:30

## 2021-05-01 RX ADMIN — DULOXETINE HYDROCHLORIDE 60 MG: 60 CAPSULE, DELAYED RELEASE ORAL at 17:14

## 2021-05-01 RX ADMIN — CEFEPIME 1 G: 1 INJECTION, POWDER, FOR SOLUTION INTRAMUSCULAR; INTRAVENOUS at 06:50

## 2021-05-01 RX ADMIN — FENTANYL CITRATE 50 MCG: 50 INJECTION, SOLUTION INTRAMUSCULAR; INTRAVENOUS at 20:23

## 2021-05-01 RX ADMIN — ACETAMINOPHEN 650 MG: 325 TABLET, FILM COATED ORAL at 20:23

## 2021-05-01 RX ADMIN — MIDAZOLAM 6 MG/HR: 5 INJECTION INTRAMUSCULAR; INTRAVENOUS at 20:51

## 2021-05-01 RX ADMIN — POTASSIUM CHLORIDE 20 MEQ: 14.9 INJECTION, SOLUTION INTRAVENOUS at 01:18

## 2021-05-01 RX ADMIN — LABETALOL HYDROCHLORIDE 10 MG: 5 INJECTION, SOLUTION INTRAVENOUS at 20:32

## 2021-05-01 RX ADMIN — CEFEPIME 1 G: 1 INJECTION, POWDER, FOR SOLUTION INTRAMUSCULAR; INTRAVENOUS at 15:33

## 2021-05-01 RX ADMIN — OXYCODONE HYDROCHLORIDE 10 MG: 100 SOLUTION ORAL at 17:13

## 2021-05-01 RX ADMIN — PAROXETINE HYDROCHLORIDE 10 MG: 20 TABLET, FILM COATED ORAL at 06:30

## 2021-05-01 RX ADMIN — Medication 50 MCG: at 11:50

## 2021-05-01 RX ADMIN — ALBUMIN (HUMAN) 50 G: 5 SOLUTION INTRAVENOUS at 08:22

## 2021-05-01 RX ADMIN — MIDAZOLAM HYDROCHLORIDE 2 MG: 1 INJECTION, SOLUTION INTRAMUSCULAR; INTRAVENOUS at 18:12

## 2021-05-01 RX ADMIN — FENTANYL CITRATE 150 MCG: 50 INJECTION, SOLUTION INTRAMUSCULAR; INTRAVENOUS at 11:32

## 2021-05-01 RX ADMIN — DIVALPROEX SODIUM 500 MG: 125 CAPSULE ORAL at 06:30

## 2021-05-01 RX ADMIN — OXYCODONE HYDROCHLORIDE 10 MG: 100 SOLUTION ORAL at 06:31

## 2021-05-01 RX ADMIN — DIVALPROEX SODIUM 500 MG: 125 CAPSULE ORAL at 15:35

## 2021-05-01 RX ADMIN — OXYCODONE HYDROCHLORIDE 10 MG: 100 SOLUTION ORAL at 10:52

## 2021-05-01 RX ADMIN — MIDAZOLAM HYDROCHLORIDE 2 MG: 1 INJECTION, SOLUTION INTRAMUSCULAR; INTRAVENOUS at 19:40

## 2021-05-01 RX ADMIN — DOCUSATE SODIUM 50 MG AND SENNOSIDES 8.6 MG 2 TABLET: 8.6; 5 TABLET, FILM COATED ORAL at 17:14

## 2021-05-01 RX ADMIN — ROCURONIUM BROMIDE 8.3 MG: 10 INJECTION, SOLUTION INTRAVENOUS at 11:32

## 2021-05-01 RX ADMIN — FENTANYL CITRATE 150 MCG: 50 INJECTION, SOLUTION INTRAMUSCULAR; INTRAVENOUS at 11:41

## 2021-05-01 RX ADMIN — KETAMINE HYDROCHLORIDE 0.9 MG/KG/HR: 50 INJECTION INTRAMUSCULAR; INTRAVENOUS at 12:27

## 2021-05-01 ASSESSMENT — PAIN DESCRIPTION - PAIN TYPE
TYPE: ACUTE PAIN

## 2021-05-01 ASSESSMENT — FIBROSIS 4 INDEX: FIB4 SCORE: 0.85

## 2021-05-01 NOTE — CARE PLAN
Problem: Safety  Goal: Will remain free from injury  Outcome: PROGRESSING AS EXPECTED  Goal: Will remain free from falls  Outcome: PROGRESSING AS EXPECTED     Problem: Safety - Medical Restraint  Goal: Remains free of injury from restraints (Restraint for Interference with Medical Device)  Description: INTERVENTIONS:  1. Determine that other, less restrictive measures have been tried or would not be effective before applying the restraint  2. Evaluate the patient's condition at the time of restraint application  3. Inform patient/family regarding the reason for restraint  4. Q2H: Monitor safety, psychosocial status, comfort, nutrition and hydration  Outcome: PROGRESSING AS EXPECTED     Problem: Safety - Medical Restraint  Goal: Free from restraint(s) (Restraint for Interference with Medical Device)  Description: INTERVENTIONS:  1. ONCE/SHIFT or MINIMUM Q12H: Assess and document the continuing need for restraints  2. Q24H: Continued use of restraint requires LIP to perform face to face examination and written order  3. Identify and implement measures to help patient regain control  Outcome: PROGRESSING SLOWER THAN EXPECTED     Problem: Pain Management  Goal: Pain level will decrease to patient's comfort goal  Outcome: PROGRESSING SLOWER THAN EXPECTED

## 2021-05-01 NOTE — PROCEDURES
Vascular Access Team     Date of Insertion: 5/1/21  Arm Circumference: 33  Internal length: 40  External Length: 0  Vein Occupancy %: 38   Reason for PICC: Antibiotic therapy  Labs: WBC 16.8, , BUN 12, Cr <0.17, GFR >60, INR NA     Consents confirmed, vessel patency confirmed with ultrasound. Risks and benefits of procedure explained to patient and education regarding central line associated bloodstream infections provided. Questions answered.      PICC placed in RUE per licensed provider order with ultrasound guidance.  4 Fr, Single lumen PICC placed in Brachial vein after 1 attempt(s). 2 mL of 1% lidocaine injected intradermally, 21 gauge microintroducer needle and modified Seldinger technique used. 40 cm catheter inserted with good blood return. Secured at 0 cm marker. Each lumen flushed without resistance with 10 mL 0.9% normal saline. PICC line secured with Biopatch and Tegaderm.     PICC tip placement location is confirmed by nurse to be in the Superior Vena Cava (SVC) utilizing 3CG technology. PICC line is appropriate for use at this time. Patient tolerated procedure well, without complications.  Patient condition relayed to unit RN or ordering physician via this post procedure note in the EMR.      Ultrasound images uploaded to PACS and viewable in the EMR - yes  Ultrasound imaged printed and placed in paper chart - no     BARD Power PICC ref # [id number], Lot # [id number], Expiration Date 3/31/22

## 2021-05-01 NOTE — PROGRESS NOTES
Sedation plan discussed with Dr Sun. Plan is to reduce the rate of Ketamine to 0.5 mg/kg/hr and change the rate of Versed to 6 mg/hr with the goal of reducing the rate of Versed by 1 mg/hr Q 24 hours. Okay to continue Oxycodone 10 mg Q 4 hours per MAR. Plan is to initiate trialing SAT and SBT starting tomorrow at 5 am.   Notified of Mg 1.5. Orders received for 2 g Mg IV.

## 2021-05-01 NOTE — PROGRESS NOTES
"Critical Care Progress Note    Date of admission  4/16/2021    Chief Complaint  50 y.o. female the past medical history of pseudotumor cerebri status post  shunt by Dr. Oh, chronic pain with history of placement of nerve stimulator, vasculitis, right anterior chest port for home IV meds with recurrent infection who presented 4/11/2021 with to [facility] with recurrent port infection and was initially treated with vancomycin and Zosyn and then changed to ceftaroline and subsequently switched to nafcillin when MSSA was identified.    Hospital Course  \"50 y.o. female the past medical history of pseudotumor cerebri status post  shunt by Dr. Oh, chronic pain with history of placement of nerve stimulator, vasculitis, right anterior chest port for home IV meds with recurrent infection who presented 4/11/2021 with to [facility] with recurrent port infection. Found to have MSSA bacteremia, endocarditis, septic pulmonary emboli/empyema/pneumatocele, and CSF pleocytosis concerning for  shunt involvement.  Seen by Dr. Oh, NSGY, at [facility] who did not recommend  shunt removal.  Seen by Dr. Vargas here for concern of fluid collection around her spinal stimulator and he recommended against removal. Remains intubated, encephalopathic.    4/16 admitted to ICU  4/17 VD 2, 2 FFP, pRBC overnight; new chest tube per gen surg  4/18 VD 3, TPa/Dornase with 1400 cc from R chest tube    4/26: R chest tube not functioning, d/c it.  broke his leg and going to OR today, so not available currently.  VD 11.  8/30%. RSBI immediately high on SBT attempt. Followed commands for RN  4/27: VD12. 8/30%. Not tolerating wean, high RSBI and tachycardic.   4/28: VD 13. VATS with Dr. Ganser.    4/29: VD 14. 8/30%. Still not tolerating wean d/t severe tachycardia just with SAT. Trialed precedex and she required fentanyl up to 600/hr to tolerate, so back on propofol.  4/30: VD 15. 8/30%. Try ketamine/versed as sedative. Severe tachycardia and HTN " with weaning down propofol. Plan for perc trach tomorrow      Interval Problem Update  Neuro: no SAT done, unclear why. SAT this afternoon after trach.   HR: ST 140s  SBP: 100-155  Tmax: 38.2  GI: BM 29th, NPO since MN  I/O: 1735  Lines: TLC, PIV, art, nevarez  Mobility: level 1  Resp: VD 16, 24/300/8/30%.  No SBT d/t tachycardia.   Vte: lovenox  PPI/H2:pepcid  Antibx: cefepime    Perc trach at 1115 today  Message to ID about PICC. Cultures clear since 4/17. Awaiting reply  Ketamine 0.9, versed 3  K scale-d/c today    Review of Systems  Review of Systems   Unable to perform ROS: Intubated        Vital Signs for last 24 hours   Pulse:  [112-145] 128  Resp:  [20-35] 27  BP: (100-155)/(60-91) 131/76  SpO2:  [95 %-100 %] 99 %    Hemodynamic parameters for last 24 hours       Respiratory Information for the last 24 hours  Vent Mode: APVCMV  Rate (breaths/min): 24  Vt Target (mL): 300  PEEP/CPAP: 8  MAP: 13  Control VTE (exp VT): 339    Physical Exam   Physical Exam  Constitutional:       Appearance: She is ill-appearing.      Interventions: She is sedated and intubated.   HENT:      Mouth/Throat:      Mouth: Mucous membranes are moist.   Eyes:      Pupils: Pupils are equal, round, and reactive to light.   Cardiovascular:      Rate and Rhythm: Regular rhythm. Tachycardia present.      Heart sounds: Murmur present.   Pulmonary:      Effort: She is intubated.      Comments: R chest wall is nearly fixed with all respiratory motion in the L lung    R chest tubes  Abdominal:      General: Bowel sounds are normal.      Tenderness: There is no abdominal tenderness. There is no guarding.   Musculoskeletal:      Right lower leg: Edema present.      Left lower leg: Edema present.      Comments: Generalized anasarca   Skin:     General: Skin is warm and dry.   Neurological:      Comments: Deeply sedated   Psychiatric:      Comments: Unable to assess         Medications  Current Facility-Administered Medications   Medication Dose Route  Frequency Provider Last Rate Last Admin   • midazolam (Versed) 2 MG/2ML injection 2 mg  2 mg Intravenous Q HOUR PRN Leti Sun M.D.   2 mg at 04/30/21 1327   • midazolam (VERSED) premix 125 mg/125 mL NS  0-10 mg/hr Intravenous Continuous Leti Sun M.D. 3 mL/hr at 05/01/21 0200 3 mg/hr at 05/01/21 0200   • oxyCODONE 20 MG/ML concentrate 10 mg  10 mg Oral Q4HRS Leti Sun M.D.   10 mg at 05/01/21 0231   • ketamine 1,000 mg in  mL infusion (critical care only)  0-2.5 mg/kg/hr Intravenous Continuous Leti Sun M.D. 37.3 mL/hr at 05/01/21 0115 0.9 mg/kg/hr at 05/01/21 0115   • labetalol (NORMODYNE/TRANDATE) injection 10 mg  10 mg Intravenous Q4HRS PRN Leti Sun M.D.       • furosemide (LASIX) injection 20 mg  20 mg Intravenous BID DIURETIC Leti Sun M.D.   20 mg at 04/30/21 1602   • norepinephrine (Levophed) 8 mg in 250 mL NS infusion (premix)  0-30 mcg/min Intravenous Continuous Benoit Loya Jr., D.O.   Stopped at 04/30/21 1245   • K+ Scale: Goal of 4.5  1 Each Intravenous Q6HRS Benoit Loya Jr., D.O.   1 Each at 05/01/21 0050   • enoxaparin (LOVENOX) inj 40 mg  40 mg Subcutaneous DAILY Benoit Loya Jr., D.O.   Stopped at 04/30/21 0600   • ceFEPIme (MAXIPIME) 1 g in  mL IVPB  1 g Intravenous Q8HRS LEYLA Hinton.OFrederick   Stopped at 04/30/21 2255   • Pharmacy Consult: Enteral tube insertion - review meds/change route/product selection  1 Each Other PHARMACY TO DOSE Elliott Salvador M.D.       • acetaminophen (Tylenol) tablet 650 mg  650 mg Enteral Tube Q4HRS PRN Elliott Salvador M.D.   650 mg at 04/30/21 1828   • magnesium hydroxide (MILK OF MAGNESIA) suspension 30 mL  30 mL Enteral Tube QDAY PRN Elliott Salvador M.D.        And   • senna-docusate (PERICOLACE or SENOKOT S) 8.6-50 MG per tablet 2 tablet  2 tablet Enteral Tube BID Elliott Salvador M.D.   2 tablet at 04/30/21 1824    And   • polyethylene glycol/lytes (MIRALAX) PACKET 1 Packet  1 Packet Enteral Tube  QDAY PRN Elliott Salvador M.D.        And   • bisacodyl (DULCOLAX) suppository 10 mg  10 mg Rectal QDAY PRN Elliott Salvador M.D.       • divalproex (DEPAKOTE SPRINKLE) capsule 500 mg  500 mg Enteral Tube Q8HRS Elliott Salvador M.D.   500 mg at 04/30/21 2225   • DULoxetine (CYMBALTA) capsule 60 mg  60 mg Enteral Tube BID Elliott Salvador M.D.   60 mg at 04/30/21 1824   • PARoxetine (PAXIL) tablet 10 mg  10 mg Enteral Tube QAM Elliott Salvador M.D.   10 mg at 04/30/21 0525   • risperiDONE (RISPERDAL) tablet 2 mg  2 mg Enteral Tube BID Elliott Salvador M.D.   2 mg at 04/30/21 1824   • Respiratory Therapy Consult   Nebulization Continuous RT Man Wahl M.D.       • famotidine (PEPCID) tablet 20 mg  20 mg Enteral Tube Q12HRS Man Wahl M.D.   20 mg at 04/30/21 1824    Or   • famotidine (PEPCID) injection 20 mg  20 mg Intravenous Q12HRS Man Wahl M.D.   20 mg at 04/29/21 1718   • MD Alert...ICU Electrolyte Replacement per Pharmacy   Other PHARMACY TO DOSE Man Wahl M.D.       • lidocaine (XYLOCAINE) 1 % injection 1-2 mL  1-2 mL Tracheal Tube Q30 MIN PRN Man Wahl M.D.       • ipratropium-albuterol (DUONEB) nebulizer solution  3 mL Nebulization Q2HRS PRN (RT) Man Wahl M.D.       • fentaNYL (SUBLIMAZE) injection 25 mcg  25 mcg Intravenous Q HOUR PRN Man Wahl M.D.        Or   • fentaNYL (SUBLIMAZE) injection 50 mcg  50 mcg Intravenous Q HOUR PRN Man Wahl M.D.   50 mcg at 04/29/21 0902    Or   • fentaNYL (SUBLIMAZE) injection 100 mcg  100 mcg Intravenous Q HOUR PRN Man Wahl M.D.   100 mcg at 04/30/21 0233       Fluids    Intake/Output Summary (Last 24 hours) at 5/1/2021 0624  Last data filed at 5/1/2021 0400  Gross per 24 hour   Intake 2161.53 ml   Output 2970 ml   Net -808.47 ml       Laboratory  Recent Labs     04/29/21  0415 04/30/21  0344 05/01/21  0306   ISTATAPH 7.397* 7.421 7.470   ISTATAPCO2 41.5* 32.1 33.6   ISTATAPO2 111* 79 76    ISTATATCO2 27 22 25   QVANRTP4AYK 98 96 96   ISTATARTHCO3 25.5* 20.9 24.4   ISTATARTBE 1 -3 1   ISTATTEMP 37.0 C 38.2 C 38.2 C   ISTATFIO2 40 30 30   ISTATSPEC Arterial Arterial Arterial   ISTATAPHTC 7.397* 7.403 7.452   [id number] 111* 86 82         Recent Labs     04/29/21  0355 04/29/21  1245 04/30/21  0344 04/30/21  0344 04/30/21  1149 04/30/21  1820 04/30/21  2348   SODIUM 133*  --  133*  --   --   --   --    POTASSIUM 4.2   < > 4.0   < > 3.5* 3.6 3.7   CHLORIDE 102  --  105  --   --   --   --    CO2 24  --  24  --   --   --   --    BUN 10  --  14  --   --   --   --    CREATININE <0.17*  --  0.19*  --   --   --   --    CALCIUM 7.8*  --  7.9*  --   --   --   --     < > = values in this interval not displayed.     Recent Labs     04/29/21 0355 04/30/21 0344   GLUCOSE 94 121*     Recent Labs     04/29/21  0355 04/30/21  0344 05/01/21  0551   WBC 21.3* 21.3* 16.8*   NEUTSPOLYS 92.90* 90.40*  --    LYMPHOCYTES 2.70* 3.50*  --    MONOCYTES 3.50 4.40  --    EOSINOPHILS 0.00 0.00  --    BASOPHILS 0.00 0.00  --      Recent Labs     04/29/21  0355 04/29/21  0355 04/30/21  0344 04/30/21  1605 05/01/21  0551   RBC 2.63*  --  2.38*  --  2.41*   HEMOGLOBIN 8.0*   < > 7.1* 7.3* 7.1*   HEMATOCRIT 24.1*  --  22.1*  --  22.5*   PLATELETCT 402  --  482*  --  424    < > = values in this interval not displayed.       Imaging  X-Ray:  I have personally reviewed the images and compared with prior images.    Assessment/Plan  * Acute bacterial endocarditis- (present on admission)  Assessment & Plan  MSSA  Cultures cleared 4/17  Infected port removed at [facility]   shunt and spinal stimulator could be seeded, NSGY has consulted on both and don't recommend removal  MV and TV vegetations with septic pulmonary emboli  Extended cefepime  ID consulting  No evidence based surgical indications (valvular CHF, difficult pathogen, etc) so no cardiovascular surgery consult is necessary at this time      Bacteremia due to methicillin susceptible  Staphylococcus aureus (MSSA)- (present on admission)  Assessment & Plan  Source presumed right anterior chest line/port with endocarditis  See discussion of endocarditis above        Empyema of right pleural space (HCC)- (present on admission)  Assessment & Plan  R empyema due to septic pulmonary emboli  Chest tube placement 4/16  At [facility], Upsized 4/18 Dr Benedict, stopped functioning so d/c'ed 4/26  Received TPA/dornase from 4/18-20.  Stopped b/c Hgb dropped requiring transfusion  S/p VATS and decortication 4/28  May need another intervention in future, per Dr. Ganser  Culture from 4/28 pleural fluid growing klebsiella        Acute respiratory failure with hypoxia (HCC)- (present on admission)  Assessment & Plan  Intubated 4/15 for respiratory failure at [facility]  Due to hydro/pneumo, trapped lung, lung abscesses  Lung protective ventilation strategies  Appropriate vent bundles  SAT/SBT  Perc trach 5/1        Tachycardia  Assessment & Plan  Sinus  Gets severely tachycardiac with any attempt at sedation wean  This is likely a combination of prolonged use of sedatives, deconditioning, active cardiopulmonary illness.   Trial a different sedation regimen-ketamine/versed to try and allow her to wean from vent  Consider adding beta blockade, but I don't want to block appropriate sinus tach    Acute encephalopathy- (present on admission)  Assessment & Plan  Multifactorial including septic and metabolic  Status post lumbar puncture with cytosis, negative Gram stain and cultures NGTD   shunt for pseudotumor cerebri  Not candidate for MRI d/t spinal stimulator  Ct repeat no intracranial abnormalities on 4/21  EEG with encephalopathy and no seizures  Minimize sedation as able  Her problem list includes autoimmune cerebritis.  Need to clarify the history of this, as I don't see anything in her prior d/c summaries about an autoimmune cerebritis    Pleural effusion, left  Assessment & Plan  Loculated fluid  Probably parapneumonic  fluid from abscesses  CT guided chest tube placed 4/23, fluid sent  Lymph predominant, NGTD  TPA/dornase one dose on 4/24.  Hold d/t dropping Hgb  CT chest 4/25 with resolution of fluid  CT clamped 4/29-no significant accumulation of fluid so d/c 4/30    Fever  Assessment & Plan  Stable fever curve  R pleural space has trapped lung so impossible to completely clear/get source control      Pneumonia  Assessment & Plan  MSSA septic emboli with empyema   VAE  BAL 4/20 klebsiella pneum  BAL 4/24 still heavy growth of klebsiella  4/28 OR pleural fluid specimen growing klebsiella.   On cefepime    CSF pleocytosis- (present on admission)  Assessment & Plan  Status post lumbar puncture 4/13  WBC 53, 82% polys, Glucose 47, protein 97 Gram stain negative and culture negative at [facility]  Neurosurgery aware,  shunt in place  MRI brain requested by neurosurgery at [facility], not candidate d/t stimulator  Repeat ct no embolic lesions  cefepime for CNS penetration    Acute blood loss anemia  Assessment & Plan  Hgb dropped again after TPA/dornase on 4/25  No clear source of bleed  D/c TPA/dornase  Hgb stable  Transfuse >7    S/P  shunt- (present on admission)  Assessment & Plan  History of pseudotumor cerebri  History of  shunt by Dr. Oh  Could be seeded by MSSA, Dr. Oh consulted at [facility], recommended MRI but unable given her spinal stimulator  I sent a message to Dr. Oh on 4/27, but he is not on call so may not be available.  She will need a long-term plan for the possibly infected  shunt    Goals of care, counseling/discussion  Assessment & Plan  Palliative is consulting  I spoke with Benoit on 4/27. I explained my concern that even if Enedelia were to survive this her QOL would likely be quite a bit worse.  She won't be able to have another port and was receiving IV meds for HA 3-4 times weekly.  She also could have worse headaches after this episode of meningitis.  He asked if she could have brain damage, and I explained  that was possible but hard to know right now.  He says part of him thinks Enedelia would want to keep fighting and part of him thinks she would say to stop aggressive care because her QOL was already very limited by her headaches. Continue aggressive care for now.    Spinal cord stimulator status  Assessment & Plan  Patient's stimulator is [employer]. It is FDA approved as MRI compatible, but the company has gone out of business, so there is no support team to assist with MRI.  Thus, if MRI were performed, our radiologists would need to protocol it correctly to manage the stimulator. The former rep from [employer] is Andre, [phone].  Information obtained from Dr. Mahoney's office, [phone].    Thrombocytosis (HCC)  Assessment & Plan  Probably reactive    Anasarca  Assessment & Plan  Mild diffuse peripheral edema  Probably due to critical illness, hypoalbuminemia, prior volume resuscitation  Gentle diuresis    Trapped lung  Assessment & Plan  Decortication 4/28  Continue chest tubes to suction and hope lungs expand    Pulmonary abscess (HCC)  Assessment & Plan  Septic emboli from TV endocarditis    Anemia  Assessment & Plan  Has required a few transfusions  No obvious source of bleed   Transfuse <7    Septic shock (HCC)- (present on admission)  Assessment & Plan  Shock resolved    Hypokalemia  Assessment & Plan  K-scale    History of vasculitis- (present on admission)  Assessment & Plan  Monitor    Prolonged Q-T interval on ECG- (present on admission)  Assessment & Plan  Avoid QT prolonging agents as able  Optimize electrolytes  Cardiac monitoring  EKG    History of complicated migraines- (present on admission)  Assessment & Plan  Home regimen    Hyponatremia  Assessment & Plan  Mild, monitor    Abnormal LFTs  Assessment & Plan  Chronic alk phos elevation  GGT high c/w liver source  Outpatient workup    H/O: CVA (cerebrovascular accident)- (present on admission)  Assessment & Plan  Monitor    Chronic pain syndrome-  (present on admission)  Assessment & Plan  Chronic back pain and intractable headaches  History of nerve stimulator   Dr Vargas consulted, no plan to remove stimulator due to her clinical instability     VTE:  lovenox  Ulcer: H2 Antagonist  Lines: Central Line  Ongoing indication addressed and Lopez Catheter  Ongoing indication addressed    I have performed a physical exam and reviewed and updated ROS and Plan today (5/1/2021). In review of yesterday's note (4/30/2021), there are no changes except as documented above.       Discussed patient condition and risk of morbidity and/or mortality with Family, RN, RT, Pharmacy, Dietary, Code status disscussed and Charge nurse / hot rounds.      The patient remains critically ill requiring mechanical ventilation, difficulty weaning from vent, complicated pleural space.  Critical care time = 35 minutes in directly providing and coordinating critical care and extensive data review.  No time overlap and excludes procedures

## 2021-05-01 NOTE — CONSULTS
DATE OF SERVICE:  05/01/2021     REASON FOR CONSULTATION:  Need for percutaneous tracheostomy.     REFERRING PHYSICIAN:  Leti Sun MD     HISTORY OF PRESENT ILLNESS:  The patient is 50 years of age, has been   ventilator dependent following cerebrovascular accident and this was   complicated by pneumonia and empyema.  The patient has been felt to be a   candidate due to persistent encephalopathy for percutaneous tracheostomy at   this time.  The patient has had a pseudotumor cerebri and a shunt placed.     PAST MEDICAL HISTORY:  Positive for anxiety disorder, autoimmune disorder,   depression, MRSA pneumonia, seizures and stroke.     PAST SURGICAL HISTORY:  Previous surgeries did not include tracheostomy.  She   has had tonsillectomy, lumbar surgery, spinal cord stimulator placement, liver   biopsy, cholecystectomy, appendectomy, skin graft, endometrial ablation.     FAMILY HISTORY:  Positive for alcohol and diabetes.     SOCIAL HISTORY:  The patient is a nonsmoker, nondrinker.     ALLERGIES:  SHE HAS ALLERGIES TO SUMATRIPTAN, PROCHLORPERAZINE AND VANCOMYCIN.     CURRENT MEDICATIONS:  List was reviewed.  Anticoagulants have been held.     REVIEW OF SYSTEMS:  Unobtainable.     PHYSICAL EXAMINATION:    GENERAL:  Shows a  female with bright pink dyed hair, lying in bed   comfortably on mechanical ventilation.  She does have a Cortrak feeding tube   in place.  HEENT:  Unremarkable.  The patient does not show evidence of previous   tracheostomy.  There is no thyromegaly.  The trachea is normal in size.  It is   fairly mobile.  The patient has no cervical lymphadenopathy.  LUNGS:  Show scattered rhonchi.  CARDIAC:  Tones are distant.  ABDOMEN:  Soft and benign.  EXTREMITIES:  Show some mild edema without other deformities.  NEUROLOGIC:  She is not currently responsive due to sedation.  BACK:  Not inspected.     LABORATORY DATA:  This morning did feature leukocytosis.  She is mildly anemic   with hemoglobin  of 7.1.  Her platelet count is 482,000.  Electrolytes appear   to be in order.  Recent pleural cultures grew out Klebsiella.  Renal function   is normal.  Magnesium and phosphate are also normal this morning.     The chest x-ray was reviewed.  This demonstrates two thoracostomies on the   right.  There is some still likely persistent effusion or pleural reaction.    She has multiple cavitary pulmonary lesions on the left.  There is some   pulmonary edema.     IMPRESSION:  At this time is acute respiratory failure with other indications   for need for percutaneous tracheostomy.  The risks, possible complications of   such procedure were carefully explained to the  who provided consent to   proceed and the plan is to proceed today with a percutaneous tracheostomy   with Dr. Sun facilitating this with a fiberoptic bronchoscopic guidance.        ______________________________  MD CA GONZALEZ/MALAIKA/RENALDO    DD:  05/01/2021 11:57  DT:  05/01/2021 16:24    Job#:  [id number]

## 2021-05-01 NOTE — PROCEDURES
"Bronchoscopy procedure note    Date of Service: 5/1/2021    Procedure:  Diagnostic and therapeutic bronchoscopy with BAL    Indication: hypoxemic respiratory failure, prolonged mechanical ventilation    Physician:  Dr. Nereida Sun MD    Post Procedure Diagnosis:  1.  Same    Narrative:  Appropriate consent was obtained and \"time out\" was performed.  A flexible fiberoptic bronchoscope was then inserted through the ETT without difficulty.  Perc trach was placed by Dr. Tompkins with bronchoscopic image guidance. Trach placement confirmed in the airway post-procedure. Small amount of blood and tan secretions were suctioned from the mainstem bronchus, RML and RLL.   No immediate complications.  EBL = 0.      Leti Sun M.D.        "

## 2021-05-01 NOTE — PROGRESS NOTES
1910: Report given by [facility] nurse, was told that patient opened eyes to pain and withdrew in LUE, LLE, and RLE and no movement in RUE when on sedation.  1925: painful stimuli applied to patient and patient did not open eyes or withdraw from pain, sedation decreased, see MAR  2000: patient re-assessed, patient still not opening eyes or withdrawing to pain, sedation decreased again, see MAR.  2100: patient re-assessed, patient opening eyes to pain but not withdrawing to pain, sedation paused, see MAR.  2130: patient re-assessed, patient opening eyes to pain and wiggling toes to command on LLE, no other movement or withdraw to pain on other extremities.  2245: patient re-assessed, patient opening eyes to pain and wiggling toes to command on LLE and RLE, no other movement or withdraw in upper extremities, Dr. Loya updated on sedation and neuro exam, he said to keep the sedation as light as possible overnight and to notify for additional changes.  2305: Patient coughing and gagging on tube, RASS +2, patient asked to squeeze hand on left side and was able to weakly, ketamine restarted at 0.3mg/kg/hr, see MAR

## 2021-05-01 NOTE — PROGRESS NOTES
Tracheostomy procedure with procedural sedation at bedside by Dr Tompkins and Dr Sun. Consent signed by the  Darci.   Time out at 1129.   Medications total given during procedure:  Versed 6 mg   Fentanyl 300 mcg  Rocuronium 50 mg.   Saqib 50 mcg IVP  Pt tolerated well. VSS.

## 2021-05-01 NOTE — OP REPORT
DATE OF SERVICE:  05/01/2021     PREOPERATIVE DIAGNOSIS:  Acute respiratory failure with complicated pneumonia,   inability to be weaned from mechanical ventilation.     POSTOPERATIVE DIAGNOSIS:  Acute respiratory failure with complicated   pneumonia, inability to be weaned from mechanical ventilation.     OPERATION:  Percutaneous bedside tracheostomy.     SURGEON:  Kolton Tompkins MD     PULMONOLOGIST:  Leti Sun MD was the pulmonologist that did the   bronchoscopic guidance.     OPERATIVE NOTE:  The patient 50 years of age, presents after a stroke.  The   patient has developed acute respiratory failure complicated by empyema and   pneumonia.  She has required decompression of the empyema.  She has been   difficult to wean from mechanical ventilation.  She still has difficulty with   airway control and was requiring sedation.  It was felt appropriate to offer   her a percutaneous tracheostomy.  The risks, possible complication of the   operation were carefully explained to the  who provided consent to   proceed.  We permitted him to watch the procedure.  The patient was   hyper-oxygenated with oxygen on the ventilator.  She was sedated with IV   sedation, fentanyl and rocuronium was used for paralysis.  Her neck was   hyperextended.  A timeout was affected.  The neck was prepped with Betadine   and anesthetized with 0.5% Xylocaine with epinephrine.  Fiberoptic   bronchoscopy ensued by Dr. Sun.  This demonstrated clearance of secretions   from the airway.  The bronchoscope was advanced into the right main stem   bronchus.  The endotracheal tube was then withdrawn with the cuff deflated.    It was positioned above the cords.  A 14-gauge needle was passed through the   skin into the trachea under direct vision and aspiration guidance.  Once the   needle was in the trachea and this was confirmed, the needle was withdrawn.    The sheath was left in place.  This was cannulated with a guidewire, which did    go down to the tracheobronchial tree and the sheath was withdrawn.  A small   incision was made along the wire in a vertical fashion.  The patient then had   the tract dilated with a short and a step dilator and stop dilator.  The   patient had the dilators withdrawn.  The stop dilator was left in place.  A   percutaneous #8 tracheostomy tube was then threaded over the stop dilator and   wire into the trachea.  The patient had the balloon inflated, the cuff held.    The patient had a ventilation shifted from the endotracheal tube to the   tracheostomy.  Dr. Sun confirmed endotracheal placement.  The tracheostomy   was sewn in place using Prolene sutures.  A trach dressing was later applied   along with tracheostomy securing straps.  The procedure was completely   uncomplicated with a blood loss of approximately 10 mL.  Counts were correct   at the end of surgery.  The patient tolerated the procedure without   complication.        ______________________________  MD CA GONZALEZ/GRISEL    DD:  05/01/2021 11:52  DT:  05/01/2021 13:39    Job#:  [id number]

## 2021-05-01 NOTE — PROGRESS NOTES
Notified Dr. Sun of increased agitation, coughing, hypertension 190's/80's, and tachycardia up to 150's on current sedation settings. See orders.

## 2021-05-01 NOTE — CARE PLAN
Problem: Ventilation Defect:  Goal: Ability to achieve and maintain unassisted ventilation or tolerate decreased levels of ventilator support  Outcome: PROGRESSING SLOWER THAN EXPECTED   Adult Ventilation Update    Total Vent Days: 16    Patient Lines/Drains/Airways Status      Active Airway       Name: Placement date: Placement time: Site: Days:    Airway Tracheostomy 8.0  05/01/21   1135   Tracheostomy  less than 1                  In the last 24 hours, the patient tolerated SBT for 0 hours  Patient failed trials because of Safety screen spontaneous breathing trial (SBT): No Order (05/01/21 1609)    Cough: Weak (05/01/21 1600)  Sputum Amount: Large (05/01/21 1609)  Sputum Color: Bloody (05/01/21 1609)  Sputum Consistency: Thick;Thin (05/01/21 1609)    Events/Summary/Plan: Pt received tracheostomy this shift, no changes made to settings.

## 2021-05-01 NOTE — PROGRESS NOTES
Infectious Disease Progress Note    Author: Brian Omer M.D. Date & Time created: 5/1/2021  4:17 PM     Cefepime 4/23 -  P/O Thoracoscopy & Decortication Day #3     PRIOR Rx:   Zosyn 4/22-4/23  Previous: Unasyn, Zosyn, Vanco, Daptomycin  Ceftaroline: start 4/13-4/15  Nafcillin 2g Q4 hrs 4/15-4/23 4/14: Unable to give name of previous NSG or neurologist. Seems confused, but able to say some sentences fluently.   4/15: Line cultures now growing MSSA. As well as from the blood. Repeat blood culture 4/13+ in both sets. Patient has worsening confusion. CSF fluid analyses suggest pleocytosis. Cultures are no growth from the CSF. Chest CT was ordered this morning indicating a loculated right pleural effusion as well as bilateral septic emboli. Dr. Mack spoke with Dr. Oh yesterday and no surgical intervention unless clinical deterioration.  4/16: Increased respiratory distress.  Tachypneic.  CT with loculated collection.  Dr Resendiz not available.  No thoracic surgeon available.  Plans to have pulmonary place CT.  Transferring to ICU.  4/17: Transferred to [facility] last night. Hgb dropped to 6.4 requiring PRBC transfusion. Requiring 2 pressors. Fio2 50%. Febrile 38.8. Pending OR decortication of empyema and hemothorax. Chest tube [facility] at [facility] shows 8,371 WBC, ,000, 74% N  4/18: Chest tube was upsized by surgery yesterday, no decortication. WBC 22k. Fio2 40%. Vasopressin. Levophed down to 2mcg. Afebrile 24 hrs. Last fever 38.6 on 4/16.   4/19: Still on vent. Levo 3 mcg, vasopressin. Afebrile 72 hours. WBC 21k. BC 4/17 NGTD x 48 hours. Unable to do MRI brain given neurostimulator per RN.   4/20: Remaining afebrile. Hb 6.2 and undergoing transfusion today.WBC 24,100, 5% bands.1700 ml CT output. Copious bloody ET secretions. No pressors. Receiving dornase and TPA per CT. Right pleural effusion not large per CXR today.   4/21: WBC 31k. Bronchoscopy yesterday showing blood. Cultures sent. TPA on hold per RN  due to arvind blood from chest tube requiring PRBC transfusion for hgb 6. Pending chest CT today. Per , spinal stimulator is non-MRI compatible. Levophed 10mcg. 30% Fio2. Afebrile.   4/22: Fever 101.3 this am. WBC 20,300 down from 32,200 yesterday. BAL growing Klebsiella pneumoniae but susceptibility panel not set up until today. CTA of brain shows no lesion. CT of chest shows enlarging cavitary lung lesions bilat and large loculated new left pleural effusion and large hydropneumothorax on right. TPA & dornase infusions of right chest ended 4/20 after considerable bleeding requiring transfusion. Hb now down again to 6.8.  4/23: WBC 23k. Fio2 40%. Tmax 38.1. US of stiumlator shows small fluid collection but no drainable abscess. Pending MICAH today. Pending L chest tube placement  4/24: Continue fever 100.8-101.8. WBC 16,600. MICAH shows large vegetations on both tricuspid valve and mitral valve with mild TR & MR.  No aortic root abscess. Left chest tube placed yesterday yielding 8 ml of old bloody fluid. Bronch today revealed copious thick maroon secretions in distal trachea and both mainstem bronchi. On the right these were obstructive. Remaining off pressors. Last positive blood cultures (MSSA) were 4/16, negative 4/17.  4/25: Fever 100.6 this AM. wBC 13,300. Hb 6.6. CT: right hydropneumothorax increasing, right bronchopleural fistula, mediastinal shift ot the left , multiple cavitary pulmonary nodules, 3.1 cm left basilar mass, splenomegaly. CT of head shows dural thickening over the clivus. Lac+ GNR >100,000 col/ml growing from BAL of 4/24, presumed Klebsiella. Left peural fluid culture negative from 4/23.  4/26: Fever 100.4 last night. WBC 13,500. Hb 7.4. Plts 502,000. ESR >120. UA fairly clear except 30 mg% protein, 2-5 wbc and rare rbc. CXR unchanged except more prominent pulmonary edema. GNR in BAL may be a different species of Klebsiella, and resistant to ampicillin & tmp-sulfa; confirmation  pending.  4/28: Fever 100.8 last night. WBC 27,700. Hb 6.6. Plts 482,000. Creat 0.19. Kleb pneum in BAL on 4/24 is resistant to ampicillin & tmp-sulfa but otherwise sensitive. O2 sat 96% on FIO2 30% now, PEEP 8. Has been re-evaluated by thoracic surgeon, Dr. Ganser, who believes she will not wean without decortication on the right. Surgery anticipated today.  4/29: S/P right thoracoscopy with decortication with cultures NG at 24 hrs.  4/30: Had a bronch due to hypoxia and hypotension. CXR with worsening right hemithorax pulmonary opacities. Bloody mucous plugs removed. Pressors started. Febrile this am. Tachy in 130s. Pressors just removed. Tolerating vent, but not a SBT candidate at the moment.  5/1: Small air leak CT-2 every ~ 2/3 breathes. The K. pneumoniae from her thoracoscopy is sensitive to her cefepime so no antibiotic change needed.     Interval History:  Past 24 hrs reviewed with RN.    Labs Reviewed, Medications Reviewed, Radiology Reviewed, Wound Reviewed, Fluids Reviewed and GI Nutrition Reviewed    Review of Systems:  Review of Systems   Unable to perform ROS: Intubated       Physical Exam:  Physical Exam  Vitals and nursing note reviewed.   Constitutional:       General: She is not in acute distress.     Appearance: She is obese.   HENT:      Head: Normocephalic and atraumatic.   Cardiovascular:      Rate and Rhythm: Regular rhythm. Tachycardia present.      Heart sounds: No murmur.   Pulmonary:      Effort: Pulmonary effort is normal. No respiratory distress.      Breath sounds: Normal breath sounds. No wheezing or rhonchi.      Comments: Small air leak every ~ 2/3 breaths CT-2  Abdominal:      General: Abdomen is flat. There is no distension.      Palpations: Abdomen is soft.      Tenderness: There is no abdominal tenderness. There is no guarding or rebound.   Neurological:      Comments: Grimaces to pain. No commands.         Labs:  Recent Results (from the past 24 hour(s))   POTASSIUM SERUM (K)     Collection Time: 04/30/21  6:20 PM   Result Value Ref Range    Potassium 3.6 3.6 - 5.5 mmol/L   POTASSIUM SERUM (K)    Collection Time: 04/30/21 11:48 PM   Result Value Ref Range    Potassium 3.7 3.6 - 5.5 mmol/L   POCT arterial blood gas device results    Collection Time: 05/01/21  3:06 AM   Result Value Ref Range    Ph 7.470 7.400 - 7.500    Pco2 33.6 26.0 - 37.0 mmHg    Po2 76 64 - 87 mmHg    Tco2 25 20 - 33 mmol/L    S02 96 93 - 99 %    Hco3 24.4 17.0 - 25.0 mmol/L    BE 1 -4 - 3 mmol/L    Body Temp 38.2 C degrees    O2 Therapy 30 %    iPF Ratio 253     Ph Temp Ben 7.452 7.400 - 7.500    Pco2 Temp Co 35.4 26.0 - 37.0 mmHg    Po2 Temp Cor 82 64 - 87 mmHg    Specimen Arterial     DelSys Vent     Tidal Volume 300 mL    Peep End Expiratory Pressure 8 cmh20    Set Rate 24     Mode APV-CMV    CBC with Differential    Collection Time: 05/01/21  5:51 AM   Result Value Ref Range    WBC 16.8 (H) 4.8 - 10.8 K/uL    RBC 2.41 (L) 4.20 - 5.40 M/uL    Hemoglobin 7.1 (L) 12.0 - 16.0 g/dL    Hematocrit 22.5 (L) 37.0 - 47.0 %    MCV 93.4 81.4 - 97.8 fL    MCH 29.5 27.0 - 33.0 pg    MCHC 31.6 (L) 33.6 - 35.0 g/dL    RDW 58.6 (H) 35.9 - 50.0 fL    Platelet Count 424 164 - 446 K/uL    MPV 8.8 (L) 9.0 - 12.9 fL    Neutrophils-Polys 74.10 (H) 44.00 - 72.00 %    Lymphocytes 8.60 (L) 22.00 - 41.00 %    Monocytes 11.20 0.00 - 13.40 %    Eosinophils 0.00 0.00 - 6.90 %    Basophils 1.70 0.00 - 1.80 %    Nucleated RBC 0.10 /100 WBC    Neutrophils (Absolute) 12.45 (H) 2.00 - 7.15 K/uL    Lymphs (Absolute) 1.44 1.00 - 4.80 K/uL    Monos (Absolute) 1.88 (H) 0.00 - 0.85 K/uL    Eos (Absolute) 0.00 0.00 - 0.51 K/uL    Baso (Absolute) 0.29 (H) 0.00 - 0.12 K/uL    NRBC (Absolute) 0.02 K/uL    Hypochromia 1+     Anisocytosis 1+     Macrocytosis 1+    Basic Metabolic Panel (BMP)    Collection Time: 05/01/21  5:51 AM   Result Value Ref Range    Sodium 134 (L) 135 - 145 mmol/L    Potassium 3.9 3.6 - 5.5 mmol/L    Chloride 104 96 - 112 mmol/L    Co2  25 20 - 33 mmol/L    Glucose 92 65 - 99 mg/dL    Bun 12 8 - 22 mg/dL    Creatinine <0.17 (L) 0.50 - 1.40 mg/dL    Calcium 8.1 (L) 8.5 - 10.5 mg/dL    Anion Gap 5.0 (L) 7.0 - 16.0   ESTIMATED GFR    Collection Time: 05/01/21  5:51 AM   Result Value Ref Range    GFR If African American >60 >60 mL/min/1.73 m 2    GFR If Non African American >60 >60 mL/min/1.73 m 2   DIFFERENTIAL MANUAL    Collection Time: 05/01/21  5:51 AM   Result Value Ref Range    Metamyelocytes 0.90 %    Myelocytes 2.60 %    Progranulocytes 0.90 %    Manual Diff Status PERFORMED    PERIPHERAL SMEAR REVIEW    Collection Time: 05/01/21  5:51 AM   Result Value Ref Range    Peripheral Smear Review see below    PLATELET ESTIMATE    Collection Time: 05/01/21  5:51 AM   Result Value Ref Range    Plt Estimation Normal    MORPHOLOGY    Collection Time: 05/01/21  5:51 AM   Result Value Ref Range    RBC Morphology Present     Polychromia 1+    MAGNESIUM    Collection Time: 05/01/21  5:51 AM   Result Value Ref Range    Magnesium 1.5 1.5 - 2.5 mg/dL   PHOSPHORUS    Collection Time: 05/01/21  5:51 AM   Result Value Ref Range    Phosphorus 2.7 2.5 - 4.5 mg/dL     Results     Procedure Component Value Units Date/Time    CULTURE TISSUE W/ GRM STAIN [[id number]]  (Abnormal)  (Susceptibility) Collected: 04/28/21 1711    Order Status: Completed Specimen: Tissue Updated: 05/01/21 1241     Significant Indicator POS     Source TISS     Site Right Pleural Debris     Culture Result -     Gram Stain Result Rare WBCs.  No organisms seen.       Culture Result Klebsiella pneumoniae  Light growth      Narrative:      Surgery Specimen    Susceptibility     Klebsiella pneumoniae (1)     Antibiotic Interpretation Microscan Method Status    Ceftriaxone Sensitive <=1 mcg/mL AUGUSTINA Final    Cefazolin Sensitive <=2 mcg/mL AUGUSTINA Final    Ciprofloxacin Sensitive <=0.25 mcg/mL AUGUSTINA Final    Cefepime Sensitive <=2 mcg/mL AUGUSTINA Final    Cefuroxime Sensitive <=4 mcg/mL AUGUSTINA Final     "Ampicillin/sulbactam Sensitive 8/4 mcg/mL AUGUSTINA Final    Ertapenem Sensitive <=0.5 mcg/mL AUGUSTINA Final    Tobramycin Sensitive <=2 mcg/mL AUGUSTINA Final    Gentamicin Sensitive <=2 mcg/mL AUGUSTINA Final    Moxifloxacin Sensitive <=2 mcg/mL AUGUSTINA Final    Pip/Tazobactam Sensitive <=8 mcg/mL AUGUSTINA Final    Trimeth/Sulfa Resistant >2/38 mcg/mL AUGUSTINA Final    Tigecycline Sensitive <=2 mcg/mL AUGUSTINA Final                   Anaerobic Culture [[id number]] Collected: 04/28/21 1711    Order Status: Completed Specimen: Tissue Updated: 05/01/21 1241     Significant Indicator NEG     Source TISS     Site Right Pleural Debris     Culture Result No Anaerobes isolated.    Narrative:      Surgery Specimen    BLOOD CULTURE [[id number]] Collected: 04/25/21 1248    Order Status: Completed Specimen: Blood from Peripheral Updated: 04/30/21 1500     Significant Indicator NEG     Source BLD     Site PERIPHERAL     Culture Result No growth after 5 days of incubation.    Narrative:      Collected By:[id number] JAELYN TORRES  Per Hospital Policy: Only change Specimen Src: to \"Line\" if  specified by physician order.  Right Forearm/Arm    BLOOD CULTURE [[id number]] Collected: 04/25/21 1248    Order Status: Completed Specimen: Blood from Peripheral Updated: 04/30/21 1500     Significant Indicator NEG     Source BLD     Site PERIPHERAL     Culture Result No growth after 5 days of incubation.    Narrative:      Collected By:[id number] JAELYN TORRES  Per Hospital Policy: Only change Specimen Src: to \"Line\" if  specified by physician order.  Left Forearm/Arm    GRAM STAIN [[id number]] Collected: 04/28/21 1711    Order Status: Completed Specimen: Tissue Updated: 04/29/21 0011     Significant Indicator .     Source TISS     Site Right Pleural Debris     Gram Stain Result Rare WBCs.  No organisms seen.      Narrative:      Surgery Specimen    SARS-CoV-2 PCR (24 hour In-House): Collect NP swab in [facility] [[id number]] Collected: 04/27/21 2215    Order Status: Completed " "Specimen: Respirate from Nasal Updated: 04/28/21 1033     SARS-CoV-2 Source NP Swab     SARS-CoV-2 by PCR NotDetected     Comment: PATIENTS: Important information regarding your results and instructions can  be found at [email or web address]   \"After your  Covid-19 Test\"  [facility] providers: PLEASE REFER TO DE-ESCALATION AND RETESTING PROTOCOL  on [email or web address]  **The [employer] COVID-19 SARS-CoV-2 RT-PCR test has been made available for  use under the Emergency Use Authorization (EUA) only.         Narrative:      Collected By:[id number] ANNE LORE SANCHEZ  Have you been in close contact with a person who is suspected  or known to be positive for COVID-19 within the last 30 days  (e.g. last seen that person < 30 days ago)->Unknown    HIV Rapid Screen (Exposure) [[id number]] Collected: 04/26/21 1430    Order Status: Completed Specimen: Blood Updated: 04/26/21 1522     HIV Ag/Ab Combo Assay Non-Reactive     Comment: [employer] HIV is a diagnostic test for the qualitative determination of HIV-1  p24 antigen and antibodies to HIV-1 (HIV-1 groups M and O) and HIV-2 in  human serum and plasma. A negative screen does not preclude the possibility  of exposure or infection. Consider retesting in high-risk patients, if  clinically indicated.         Narrative:      Enter exposed person's MRN only (do not enter employee's  name):->[id number]  Exposed person's employer?->[employer]    Acid Fast Stain [[id number]] Collected: 04/24/21 0115    Order Status: Completed Specimen: Respirate Updated: 04/26/21 1253     Significant Indicator NEG     Source RESP     Site BRONCHOALVEOLAR LAVAGE     AFB Smear Results No acid fast bacilli seen.    Narrative:      Collected By:[id number] QUANG VALERIO  Which Lobe (Bronch Only):->LLL  Collected By:[id number] QUANG VALERIO  BAL from RLL  Collected By:[id number] QUANG VALERIO    Quant Bronchial Washing [[id number]]  (Abnormal)  (Susceptibility) Collected: 04/24/21 0115    Order Status: " Completed Specimen: Respirate from Bronchoalveolar Lavage Updated: 04/26/21 1252     Significant Indicator POS     Source RESP     Site BRONCHOALVEOLAR LAVAGE     Culture Result 500 cfu/mL usual upper respiratory sari  No clinically significant Staphylococcus aureus, Methicillin  Resistant Staphylococcus aureus, or Pseudomonas species  isolated.       Gram Stain Result Moderate WBCs.  Rare Gram negative rods.       Culture Result Klebsiella pneumoniae  >100,000 cfu/mL      Narrative:      Collected By:[id number] QUANG VALERIO  Which Lobe (Bronch Only):->LLL  Collected By:[id number] QUANG HARRELL.  BAL from RLL  Collected By:[id number] QUANG HARRELL.    Susceptibility     Klebsiella pneumoniae (1)     Antibiotic Interpretation Microscan Method Status    Ampicillin Resistant >16 mcg/mL AUGUSTINA Final    Ceftriaxone Sensitive <=1 mcg/mL AUGUSTINA Final    Ceftazidime Sensitive <=1 mcg/mL AUGUSTINA Final    Cefazolin Sensitive <=2 mcg/mL AUGUSTINA Final    Ciprofloxacin Sensitive <=0.25 mcg/mL AUGUSTINA Final    Cefepime Sensitive <=2 mcg/mL AUGUSTINA Final    Ampicillin/sulbactam Sensitive <=4/2 mcg/mL AUGUSTINA Final    Cefuroxime Sensitive <=4 mcg/mL AUGUSTINA Final    Tobramycin Sensitive <=2 mcg/mL AUGUSTINA Final    Ertapenem Sensitive <=0.5 mcg/mL AUGUSTINA Final    Gentamicin Sensitive <=2 mcg/mL AUGUSTINA Final    Moxifloxacin Sensitive <=2 mcg/mL AUGUSTINA Final    Pip/Tazobactam Sensitive <=8 mcg/mL AUGUSTINA Final    Trimeth/Sulfa Resistant >2/38 mcg/mL AUGUSTINA Final    Tigecycline Sensitive <=2 mcg/mL AUGUSTINA Final                   GRAM STAIN [[id number]] Collected: 04/24/21 0115    Order Status: Completed Specimen: Respirate Updated: 04/26/21 1252     Significant Indicator .     Source RESP     Site BRONCHOALVEOLAR LAVAGE     Gram Stain Result Moderate WBCs.  Rare Gram negative rods.      Narrative:      Collected By:[id number] QUANG VALERIO  Which Lobe (Bronch Only):->LLL  Collected By:[id number] QUANG HARRELL.  BAL from RLL  Collected By:[id number] QUANG VALERIO    FLUID CULTURE W/GRAM  STAIN [[id number]] Collected: 21 1737    Order Status: Completed Specimen: Body Fluid from Pleural Fluid Updated: 21 0722     Significant Indicator NEG     Source BF     Site PLEURAL FLUID     Culture Result No growth at 72 hours.     Gram Stain Result Many WBCs.  No organisms seen.      Narrative:      Collected By:[id number] LAURA SANCHEZ  8cc left pleural fluid collected by Dr. Ulloa; Results to Dr. Burgos  Collected By:[id number] LAURA SANCHEZ    URINALYSIS [[id number]]  (Abnormal) Collected: 21 1946    Order Status: Completed Specimen: Urine, Lopez Cath Updated: 21     Color Yellow     Character Clear     Specific Gravity 1.020     Ph 6.5     Glucose Negative mg/dL      Ketones Trace mg/dL      Protein 30 mg/dL      Bilirubin Small     Urobilinogen, Urine 2.0     Nitrite Negative     Leukocyte Esterase Trace     Occult Blood Moderate     Micro Urine Req Microscopic    Narrative:      Collected By:[id number] POLO THOMAS        Hemodynamics:  Temp (24hrs), Av.8 °C (100 °F), Min:37.5 °C (99.5 °F), Max:38.1 °C (100.6 °F)  Temperature: 37.7 °C (99.9 °F), Monitored Temp: 37.9 °C (100.2 °F)  Pulse  Av.2  Min: 40  Max: 149   Blood Pressure: (!) 170/142, Arterial BP: (!) 181/78    Arterial Line 21 Radial (Active)   Site Assessment Clean;Dry;Intact 21   Line Status Pulsatile blood flow 21   Art Line Waveform Appropriate 21   Line Care Flushed per protocol;Leveled;Zeroed 21   Color/Movement/Sensation Pale fingers/toes 21   Perfusion Check Left 21   Dressing Type Transparent 21   Dressing Status Clean;Dry;Intact 21   Dressing Intervention N/A 21   Dressing Change Due 21 0400   Date Primary Tubing Changed 21 0400   Date IV Connector(s) Changed 21 0400   NEXT Primary Tubing Change  21 0400   Cuff Pressure Correlates Yes 21 0800        Peripheral IV 22 G Left Upper arm (Active)   Site Assessment Clean;Dry;Intact 05/01/21 0800   Dressing Type Transparent 05/01/21 0800   Line Status No blood return;Flushed;Scrubbed the hub prior to access;Saline locked 05/01/21 0800   Dressing Status Clean;Dry;Intact 05/01/21 0800   Dressing Intervention N/A 05/01/21 0800   Dressing Change Due 05/08/21 05/01/21 0400   Date Primary Tubing Changed 04/24/21 04/24/21 0800   Date Secondary Tubing Changed 04/20/21 04/20/21 2040   NEXT Primary Tubing Change  04/24/21 04/23/21 2000   NEXT Secondary Tubing Change  04/23/21 04/22/21 2000   Infiltration Grading ([facility], [facility]) 0 05/01/21 0800   Phlebitis Scale ([facility] Only) 0 05/01/21 0800       PICC Single Lumen 05/01/21 Right Brachial (Active)   Site Assessment Clean;Dry;Intact 05/01/21 1507   Line Status Blood return noted;Flushed;Scrubbed the hub prior to access;Saline locked 05/01/21 1507   Line Secured at (cm) 0 cm 05/01/21 1507   Extremity Circumference (cm) 33 cm 05/01/21 1507   Dressing Type Biopatch;Occlusive;Securing device;Skin barrier;Transparent 05/01/21 1507   Dressing Status Clean;Dry;Intact 05/01/21 1507   Dressing Intervention Initial dressing 05/01/21 1507   Dressing Change Due 05/08/21 05/01/21 1507   Date IV Connector(s) Changed 05/01/21 05/01/21 1507   NEXT IV Connector(s) Change 05/04/21 05/01/21 1507   Line Necessity Assessed Antibiotic Therapy Greater than 7 Days 05/01/21 1507       CVC Triple Lumen 04/21/21 Left Subclavian (Active)   Consider Removal of Femoral Line Not Applicable 04/30/21 2000   Site Assessment Clean;Dry;Intact 05/01/21 0800   Lumen 1 Status Blood return noted;Flushed;Scrubbed the hub prior to access;Normal saline locked 05/01/21 0800   Lumen 3 Status Infusing 05/01/21 0800   Lumen 2 Status Infusing 05/01/21 0800   Dressing Type Biopatch;Occlusive;Transparent 05/01/21 0800   Dressing Status Clean;Dry;Intact 05/01/21 0800   Dressing Intervention N/A 05/01/21 0800   Dressing  Change Due 05/08/21 05/01/21 0800   Date Primary Tubing Changed 05/01/21 05/01/21 0400   Date Secondary Tubing Changed 04/28/21 04/28/21 2100   Date IV Connector(s) Changed 05/01/21 05/01/21 0400   NEXT Primary Tubing Change  05/04/21 05/01/21 0400   NEXT Secondary Tubing Change  04/29/21 04/28/21 2100   NEXT IV Connector(s) Change 05/04/21 05/01/21 0400   Line Necessity Assessed Antibiotic Therapy Greater than 7 Days 05/01/21 0800     Wound:  @WOUNDLDA(4)@     Fluids:  Intake/Output       04/29/21 0700 - 04/30/21 0659 04/30/21 0700 - 05/01/21 0659 05/01/21 0700 - 05/02/21 0659      9632-1663 5636-8272 Total 1503-1069 3473-3565 Total 6961-5593 2398-0523 Total       Intake    P.O.  0  0 0  --  -- --  0  -- 0    P.O. 0 0 0 -- -- -- 0 -- 0    I.V.  309.5  437.3 746.8  653.9  418.3 1072.1  623.3  -- 623.3    Albumin Volume -- -- -- -- -- -- 200 -- 200    Magnesium Sulfate Volume -- -- -- -- -- -- 59.2 -- 59.2    Precedex Volume 250.1 247.2 497.3 -- -- -- -- -- --    Ketamine Volume -- -- -- 220.5 381.6 602.1 320.2 -- 320.2    K+ Scale -- 7.7 7.7 -- -- -- -- -- --    Phenylephrine Volume -- -- -- -- -- -- 4.5 -- 4.5    Propofol Volume 59.5 50.8 110.3 245.1 -- 245.1 -- -- --    Midazolam Volume -- -- -- 13.5 36.7 50.2 39.5 -- 39.5    Norepinephrine Volume -- 131.6 131.6 128.7 -- 128.7 0 -- 0    IV Volume (Fentanyl) -- -- -- 46 -- 46 -- -- --    NG/GT  500  540 1040  480  240 720  0  -- 0    Intake (mL) (Enteral Tube 04/21/21 Dobhoff - Gastric Right nare)  480 240 720 0 -- 0    IV Piggyback  200  200 400  100  200 300  100  -- 100    Volume (mL) (potassium chloride (KCL) ivpb 10 mEq) 100 -- 100 -- -- -- -- -- --    Volume (mL) (potassium chloride (KCL) ivpb 10 mEq) 100 -- 100 -- -- -- -- -- --    Volume (mL) (potassium chloride (KCL) ivpb 10 mEq) -- 100 100 -- -- -- -- -- --    Volume (mL) (potassium chloride in water (KCL) ivpb **Administer in ICU only** 20 mEq) -- 100 100 -- -- -- -- -- --    Volume (mL)  (potassium chloride in water (KCL) ivpb **Administer in ICU only** 20 mEq) -- -- -- 100 -- 100 0 -- 0    Volume (mL) (potassium chloride in water (KCL) ivpb **Administer in ICU only** 20 mEq) -- -- -- -- 100 100 0 -- 0    Volume (mL) (potassium chloride in water (KCL) ivpb **Administer in ICU only** 20 mEq) -- -- -- -- 100 100 0 -- 0    Volume (mL) (potassium chloride (KCL) ivpb 10 mEq) -- -- -- -- -- -- 100 -- 100    Enteral  120  60 180  90  90 180  30  -- 30    Free Water / Tube Flush 120 60 180 90 90 180 30 -- 30    Total Intake 1129.5 1237.3 2366.8 1323.9 948.3 2272.1 753.3 -- 753.3       Output    Urine  890  605 1495  2525  875 3400  395  -- 395    Output (mL) (Urethral Catheter)  2525 875 3400 395 -- 395    Drains  --  0 0  --  -- --  --  -- --    Residual Amount (mL) (Discarded) (Enteral Tube 04/21/21 Dobhoff - Gastric Right nare) -- 0 0 -- -- -- -- -- --    Stool  --  -- --  --  -- --  --  -- --    Number of Times Stooled 2 x 0 x 2 x -- 1 x 1 x -- -- --    Emesis/NG output  --  0 0  --  -- --  0  -- 0    Output (mL) (Enteral Tube 04/21/21 Dobhoff - Gastric Right nare) -- 0 0 -- -- -- 0 -- 0    Chest Tube  150  90 240  70  -- 70  80  -- 80    Output (mL) (Chest Tube 2 Right Midaxillary) 60 55 115 50 -- 50 30 -- 30    Output (mL) ([REMOVED] Chest Tube 2 Left Other (Comment)) 10 0 10 20 -- 20 -- -- --    Output (mL) (Chest Tube 1 Right Midaxillary;Pleural) 80 35 115 0 -- 0 50 -- 50    Total Output 9543 964 4700 2595 875 3470 475 -- 475       Net I/O     89.5 542.3 631.8 -1271.1 73.3 -1197.9 278.3 -- 278.3        Weight: 81.7 kg (180 lb 1.9 oz)  GI/Nutrition:  Orders Placed This Encounter   Procedures   • Diet NPO     Standing Status:   Standing     Number of Occurrences:   1     Order Specific Question:   Restrict to:     Answer:   Strict [1]     Medications:  Current Facility-Administered Medications   Medication Last Admin   • midazolam (Versed) 2 MG/2ML injection 2 mg 2 mg at 04/30/21 1327   •  midazolam (VERSED) premix 125 mg/125 mL NS 6 mg/hr at 05/01/21 1251   • oxyCODONE 20 MG/ML concentrate 10 mg 10 mg at 05/01/21 1535   • ketamine 1,000 mg in  mL infusion (critical care only) 0.5 mg/kg/hr at 05/01/21 1249   • labetalol (NORMODYNE/TRANDATE) injection 10 mg     • norepinephrine (Levophed) 8 mg in 250 mL NS infusion (premix) Stopped at 04/30/21 1245   • enoxaparin (LOVENOX) inj 40 mg Stopped at 04/30/21 0600   • ceFEPIme (MAXIPIME) 1 g in  mL IVPB 1 g at 05/01/21 1533   • Pharmacy Consult: Enteral tube insertion - review meds/change route/product selection     • acetaminophen (Tylenol) tablet 650 mg 650 mg at 04/30/21 1828   • magnesium hydroxide (MILK OF MAGNESIA) suspension 30 mL      And   • senna-docusate (PERICOLACE or SENOKOT S) 8.6-50 MG per tablet 2 tablet 2 tablet at 05/01/21 0630    And   • polyethylene glycol/lytes (MIRALAX) PACKET 1 Packet      And   • bisacodyl (DULCOLAX) suppository 10 mg     • divalproex (DEPAKOTE SPRINKLE) capsule 500 mg 500 mg at 05/01/21 1535   • DULoxetine (CYMBALTA) capsule 60 mg 60 mg at 05/01/21 0631   • PARoxetine (PAXIL) tablet 10 mg 10 mg at 05/01/21 0630   • risperiDONE (RISPERDAL) tablet 2 mg 2 mg at 05/01/21 0630   • Respiratory Therapy Consult     • famotidine (PEPCID) tablet 20 mg 20 mg at 05/01/21 0630    Or   • famotidine (PEPCID) injection 20 mg 20 mg at 04/29/21 1718   • MD Alert...ICU Electrolyte Replacement per Pharmacy     • lidocaine (XYLOCAINE) 1 % injection 1-2 mL     • ipratropium-albuterol (DUONEB) nebulizer solution     • fentaNYL (SUBLIMAZE) injection 25 mcg      Or   • fentaNYL (SUBLIMAZE) injection 50 mcg 50 mcg at 04/29/21 0902    Or   • fentaNYL (SUBLIMAZE) injection 100 mcg 100 mcg at 04/30/21 0233     Medical Decision Making, by Problem:  Active Hospital Problems    Diagnosis    • *Acute bacterial endocarditis [I33.0]    • Acute respiratory failure with hypoxia (HCC) [J96.01]    • Empyema of right pleural space ([facility]) [J86.9]     • Bacteremia due to methicillin susceptible Staphylococcus aureus (MSSA) [R78.81, B95.61]    • Tachycardia [R00.0]    • Acute encephalopathy [G93.40]    • Fever [R50.9]    • Pleural effusion, left [J90]    • Atelectasis [J98.11]    • CSF pleocytosis [D72.9]    • Pneumonia [J18.9]    • Pseudotumor cerebri [G93.2]    • S/P  shunt [Z98.2]    • Prolonged Q-T interval on ECG [R94.31]    • History of vasculitis [Z86.79]    • History of complicated migraines [G43.109]    • Hyponatremia [E87.1]    • H/O: CVA (cerebrovascular accident) [Z86.73]    • Chronic pain syndrome [G89.4]    • Thrombocytopenia (HCC) [D69.6]    • Spinal cord stimulator status [Z96.89]    • Goals of care, counseling/discussion [Z71.89]    • Abnormal movement [G25.9]    • Pulmonary abscess (HCC) [J85.2]    • 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       - Acute left empyema s/p chest tube placement 4/23  -Multiple acute septic pulmonary emboli bilaterally  -Acute bilateral pneumonia due to above     --Pulmonary edema  -Pseudotumor cerebri with underlying  shunt: possible arachnoiditis  -Chronic migraine headaches  -History of autoimmune vasculitis  -Elevated alkaline phosphatase & bilirubin  -Acute encephalopathy due to sepsis      - anemia due to above      - New secondary Klebsiella pneumoniae pneumonia (presumed VAP)     Plan   Patient has evidence of disseminated staphylococcal sepsis source from her line with infection to her lungs, tricuspid & mitral valves and   bilat empyema requiring transfer to [facility] for decortication due to lack of CT surgery support at [facility] [place].  Patient with bilateral cavitary lung abscess and empyema.   - Persistent fever likely attributable to empyema and multiple lung abscesses. Currently covered.  - Continue cefepime  - US of spinal stimulator shows ill defined fluid collection, but undrainable. NSGY does not recommend stiumulator removal due to instability      - Consider MRI of brain when clinically feasible. Spinal stimulator may prevent MRI.   - Central line exchanged 4/22  - Initial CSF cultures has pleocytosis, but no culture growth. CSF findings on 4/13:  63 wbc, 82% PMN, protein 97  - Chest tube upsized by surgery 4/17.   - Blood culture from 4/14 at [facility] positive. Admission blood cultures here on 4/16 both positive.  4/17 blood cultures NGTD  - original infected port removed 4/12  - vent management per pulmonology  - Tolerated the decortication. Cx NGTD.   - Had bloody mucous plugs removed by bronch  - Now off pressors   - No IV abx changes or further ID workup in the moment  - Discussion for trach I hear is pending decision.     Small air leak CT-2 every ~ 2/3 breathes. The K. pneumoniae from her thoracoscopy is sensitive to her cefepime so no antibiotic change needed. Case and treatment reviewed with mj eubanks, RN and Dr. Ganser 35 min on floor in ICU with > 50% face to face view and 100% in direct patient care/counseling/consulting today.

## 2021-05-01 NOTE — CARE PLAN
Problem: Communication  Goal: The ability to communicate needs accurately and effectively will improve  Outcome: PROGRESSING SLOWER THAN EXPECTED     Problem: Safety  Goal: Will remain free from injury  Outcome: PROGRESSING AS EXPECTED  Goal: Will remain free from falls  Outcome: PROGRESSING AS EXPECTED     Problem: Infection  Goal: Will remain free from infection  Outcome: PROGRESSING SLOWER THAN EXPECTED     Problem: Safety - Medical Restraint  Goal: Remains free of injury from restraints (Restraint for Interference with Medical Device)  Description: INTERVENTIONS:  1. Determine that other, less restrictive measures have been tried or would not be effective before applying the restraint  2. Evaluate the patient's condition at the time of restraint application  3. Inform patient/family regarding the reason for restraint  4. Q2H: Monitor safety, psychosocial status, comfort, nutrition and hydration  Outcome: PROGRESSING AS EXPECTED  Goal: Free from restraint(s) (Restraint for Interference with Medical Device)  Description: INTERVENTIONS:  1. ONCE/SHIFT or MINIMUM Q12H: Assess and document the continuing need for restraints  2. Q24H: Continued use of restraint requires LIP to perform face to face examination and written order  3. Identify and implement measures to help patient regain control  Outcome: PROGRESSING SLOWER THAN EXPECTED     Problem: Respiratory:  Goal: Respiratory status will improve  Outcome: PROGRESSING SLOWER THAN EXPECTED     Problem: Pain Management  Goal: Pain level will decrease to patient's comfort goal  Outcome: PROGRESSING SLOWER THAN EXPECTED     Problem: Psychosocial Needs:  Goal: Level of anxiety will decrease  Outcome: PROGRESSING SLOWER THAN EXPECTED

## 2021-05-01 NOTE — CARE PLAN
Problem: Safety  Goal: Will remain free from injury  Outcome: PROGRESSING AS EXPECTED  Note: Verified that safety precautions are in place and education provided to pt on fall safety and utilization of call button       Problem: Knowledge Deficit  Goal: Knowledge of disease process/condition, treatment plan, diagnostic tests, and medications will improve  Outcome: PROGRESSING SLOWER THAN EXPECTED  Note: Family updated on POC, tests, and medications. Family verbalizes understanding and has no further questions at this time. Family educated on calling for any more questions.

## 2021-05-02 ENCOUNTER — APPOINTMENT (OUTPATIENT)
Dept: RADIOLOGY | Facility: MEDICAL CENTER | Age: 51
DRG: 003 | End: 2021-05-02
Attending: INTERNAL MEDICINE
Payer: MEDICARE

## 2021-05-02 PROBLEM — R45.1 AGITATION REQUIRING SEDATION PROTOCOL: Status: ACTIVE | Noted: 2021-05-02

## 2021-05-02 LAB
ABO GROUP BLD: NORMAL
ANION GAP SERPL CALC-SCNC: 9 MMOL/L (ref 7–16)
BARCODED ABORH UBTYP: 600
BARCODED ABORH UBTYP: 600
BARCODED PRD CODE UBPRD: NORMAL
BARCODED PRD CODE UBPRD: NORMAL
BARCODED UNIT NUM UBUNT: NORMAL
BARCODED UNIT NUM UBUNT: NORMAL
BASOPHILS # BLD AUTO: 0.6 % (ref 0–1.8)
BASOPHILS # BLD: 0.11 K/UL (ref 0–0.12)
BLD GP AB SCN SERPL QL: NORMAL
BUN SERPL-MCNC: 12 MG/DL (ref 8–22)
CALCIUM SERPL-MCNC: 8 MG/DL (ref 8.5–10.5)
CHLORIDE SERPL-SCNC: 104 MMOL/L (ref 96–112)
CO2 SERPL-SCNC: 23 MMOL/L (ref 20–33)
COMPONENT R 8504R: NORMAL
COMPONENT R 8504R: NORMAL
CREAT SERPL-MCNC: <0.17 MG/DL (ref 0.5–1.4)
EOSINOPHIL # BLD AUTO: 0 K/UL (ref 0–0.51)
EOSINOPHIL NFR BLD: 0 % (ref 0–6.9)
ERYTHROCYTE [DISTWIDTH] IN BLOOD BY AUTOMATED COUNT: 56.2 FL (ref 35.9–50)
GLUCOSE SERPL-MCNC: 117 MG/DL (ref 65–99)
HCT VFR BLD AUTO: 21 % (ref 37–47)
HGB BLD-MCNC: 6.6 G/DL (ref 12–16)
IMM GRANULOCYTES # BLD AUTO: 0.66 K/UL (ref 0–0.11)
IMM GRANULOCYTES NFR BLD AUTO: 3.9 % (ref 0–0.9)
LYMPHOCYTES # BLD AUTO: 1.03 K/UL (ref 1–4.8)
LYMPHOCYTES NFR BLD: 6.1 % (ref 22–41)
MAGNESIUM SERPL-MCNC: 1.7 MG/DL (ref 1.5–2.5)
MCH RBC QN AUTO: 29.7 PG (ref 27–33)
MCHC RBC AUTO-ENTMCNC: 31.4 G/DL (ref 33.6–35)
MCV RBC AUTO: 94.6 FL (ref 81.4–97.8)
MONOCYTES # BLD AUTO: 1.47 K/UL (ref 0–0.85)
MONOCYTES NFR BLD AUTO: 8.7 % (ref 0–13.4)
NEUTROPHILS # BLD AUTO: 13.68 K/UL (ref 2–7.15)
NEUTROPHILS NFR BLD: 80.7 % (ref 44–72)
NRBC # BLD AUTO: 0.06 K/UL
NRBC BLD-RTO: 0.4 /100 WBC
PHOSPHATE SERPL-MCNC: 3.1 MG/DL (ref 2.5–4.5)
PLATELET # BLD AUTO: 403 K/UL (ref 164–446)
PMV BLD AUTO: 8.8 FL (ref 9–12.9)
POTASSIUM SERPL-SCNC: 3.2 MMOL/L (ref 3.6–5.5)
PRODUCT TYPE UPROD: NORMAL
PRODUCT TYPE UPROD: NORMAL
RBC # BLD AUTO: 2.22 M/UL (ref 4.2–5.4)
RH BLD: NORMAL
SODIUM SERPL-SCNC: 136 MMOL/L (ref 135–145)
UNIT STATUS USTAT: NORMAL
UNIT STATUS USTAT: NORMAL
WBC # BLD AUTO: 17 K/UL (ref 4.8–10.8)

## 2021-05-02 PROCEDURE — 700105 HCHG RX REV CODE 258: Performed by: INTERNAL MEDICINE

## 2021-05-02 PROCEDURE — 700111 HCHG RX REV CODE 636 W/ 250 OVERRIDE (IP): Performed by: INTERNAL MEDICINE

## 2021-05-02 PROCEDURE — 36430 TRANSFUSION BLD/BLD COMPNT: CPT

## 2021-05-02 PROCEDURE — A9270 NON-COVERED ITEM OR SERVICE: HCPCS | Performed by: INTERNAL MEDICINE

## 2021-05-02 PROCEDURE — 700102 HCHG RX REV CODE 250 W/ 637 OVERRIDE(OP): Performed by: INTERNAL MEDICINE

## 2021-05-02 PROCEDURE — 84100 ASSAY OF PHOSPHORUS: CPT

## 2021-05-02 PROCEDURE — 86901 BLOOD TYPING SEROLOGIC RH(D): CPT

## 2021-05-02 PROCEDURE — 99291 CRITICAL CARE FIRST HOUR: CPT | Performed by: INTERNAL MEDICINE

## 2021-05-02 PROCEDURE — 700101 HCHG RX REV CODE 250: Performed by: INTERNAL MEDICINE

## 2021-05-02 PROCEDURE — 86923 COMPATIBILITY TEST ELECTRIC: CPT

## 2021-05-02 PROCEDURE — 93926 LOWER EXTREMITY STUDY: CPT | Mod: RT

## 2021-05-02 PROCEDURE — 85025 COMPLETE CBC W/AUTO DIFF WBC: CPT

## 2021-05-02 PROCEDURE — 94003 VENT MGMT INPAT SUBQ DAY: CPT

## 2021-05-02 PROCEDURE — 86900 BLOOD TYPING SEROLOGIC ABO: CPT

## 2021-05-02 PROCEDURE — 86850 RBC ANTIBODY SCREEN: CPT

## 2021-05-02 PROCEDURE — 770022 HCHG ROOM/CARE - ICU (200)

## 2021-05-02 PROCEDURE — 83735 ASSAY OF MAGNESIUM: CPT

## 2021-05-02 PROCEDURE — 94799 UNLISTED PULMONARY SVC/PX: CPT

## 2021-05-02 PROCEDURE — 80048 BASIC METABOLIC PNL TOTAL CA: CPT

## 2021-05-02 PROCEDURE — 71045 X-RAY EXAM CHEST 1 VIEW: CPT

## 2021-05-02 PROCEDURE — P9016 RBC LEUKOCYTES REDUCED: HCPCS

## 2021-05-02 RX ORDER — POTASSIUM CHLORIDE 20 MEQ/1
40 TABLET, EXTENDED RELEASE ORAL DAILY
Status: DISCONTINUED | OUTPATIENT
Start: 2021-05-03 | End: 2021-05-04

## 2021-05-02 RX ORDER — POTASSIUM CHLORIDE 20 MEQ/1
40 TABLET, EXTENDED RELEASE ORAL DAILY
Status: DISCONTINUED | OUTPATIENT
Start: 2021-05-02 | End: 2021-05-02

## 2021-05-02 RX ORDER — CARVEDILOL 6.25 MG/1
6.25 TABLET ORAL 2 TIMES DAILY WITH MEALS
Status: DISCONTINUED | OUTPATIENT
Start: 2021-05-02 | End: 2021-05-02

## 2021-05-02 RX ORDER — CARVEDILOL 6.25 MG/1
6.25 TABLET ORAL 2 TIMES DAILY WITH MEALS
Status: DISCONTINUED | OUTPATIENT
Start: 2021-05-02 | End: 2021-05-07

## 2021-05-02 RX ORDER — OXYCODONE HCL 20 MG/ML
15 CONCENTRATE, ORAL ORAL EVERY 4 HOURS
Status: DISCONTINUED | OUTPATIENT
Start: 2021-05-02 | End: 2021-05-03

## 2021-05-02 RX ORDER — FUROSEMIDE 10 MG/ML
20 INJECTION INTRAMUSCULAR; INTRAVENOUS
Status: DISCONTINUED | OUTPATIENT
Start: 2021-05-02 | End: 2021-05-08

## 2021-05-02 RX ORDER — HYDROMORPHONE HYDROCHLORIDE 1 MG/ML
1-2 INJECTION, SOLUTION INTRAMUSCULAR; INTRAVENOUS; SUBCUTANEOUS
Status: DISCONTINUED | OUTPATIENT
Start: 2021-05-02 | End: 2021-05-07

## 2021-05-02 RX ORDER — MAGNESIUM SULFATE HEPTAHYDRATE 40 MG/ML
2 INJECTION, SOLUTION INTRAVENOUS ONCE
Status: COMPLETED | OUTPATIENT
Start: 2021-05-02 | End: 2021-05-02

## 2021-05-02 RX ADMIN — DOCUSATE SODIUM 50 MG AND SENNOSIDES 8.6 MG 2 TABLET: 8.6; 5 TABLET, FILM COATED ORAL at 06:42

## 2021-05-02 RX ADMIN — LABETALOL HYDROCHLORIDE 10 MG: 5 INJECTION, SOLUTION INTRAVENOUS at 21:57

## 2021-05-02 RX ADMIN — POTASSIUM BICARBONATE 25 MEQ: 978 TABLET, EFFERVESCENT ORAL at 09:20

## 2021-05-02 RX ADMIN — OXYCODONE HYDROCHLORIDE 15 MG: 100 SOLUTION ORAL at 17:12

## 2021-05-02 RX ADMIN — MAGNESIUM SULFATE IN WATER 2 G: 40 INJECTION, SOLUTION INTRAVENOUS at 08:06

## 2021-05-02 RX ADMIN — FUROSEMIDE 20 MG: 10 INJECTION, SOLUTION INTRAMUSCULAR; INTRAVENOUS at 17:12

## 2021-05-02 RX ADMIN — KETAMINE HYDROCHLORIDE 0.5 MG/KG/HR: 50 INJECTION INTRAMUSCULAR; INTRAVENOUS at 16:17

## 2021-05-02 RX ADMIN — DULOXETINE HYDROCHLORIDE 60 MG: 60 CAPSULE, DELAYED RELEASE ORAL at 17:12

## 2021-05-02 RX ADMIN — OXYCODONE HYDROCHLORIDE 10 MG: 100 SOLUTION ORAL at 06:41

## 2021-05-02 RX ADMIN — FENTANYL CITRATE 50 MCG: 50 INJECTION, SOLUTION INTRAMUSCULAR; INTRAVENOUS at 02:08

## 2021-05-02 RX ADMIN — LABETALOL HYDROCHLORIDE 10 MG: 5 INJECTION, SOLUTION INTRAVENOUS at 02:17

## 2021-05-02 RX ADMIN — FENTANYL CITRATE 100 MCG: 50 INJECTION, SOLUTION INTRAMUSCULAR; INTRAVENOUS at 03:15

## 2021-05-02 RX ADMIN — POTASSIUM BICARBONATE 25 MEQ: 978 TABLET, EFFERVESCENT ORAL at 08:06

## 2021-05-02 RX ADMIN — OXYCODONE HYDROCHLORIDE 15 MG: 100 SOLUTION ORAL at 10:01

## 2021-05-02 RX ADMIN — CEFEPIME 1 G: 1 INJECTION, POWDER, FOR SOLUTION INTRAMUSCULAR; INTRAVENOUS at 21:39

## 2021-05-02 RX ADMIN — ENOXAPARIN SODIUM 40 MG: 40 INJECTION SUBCUTANEOUS at 06:43

## 2021-05-02 RX ADMIN — FAMOTIDINE 20 MG: 20 TABLET ORAL at 06:42

## 2021-05-02 RX ADMIN — MIDAZOLAM HYDROCHLORIDE 2 MG: 1 INJECTION, SOLUTION INTRAMUSCULAR; INTRAVENOUS at 15:26

## 2021-05-02 RX ADMIN — FAMOTIDINE 20 MG: 20 TABLET ORAL at 17:11

## 2021-05-02 RX ADMIN — MIDAZOLAM HYDROCHLORIDE 2 MG: 1 INJECTION, SOLUTION INTRAMUSCULAR; INTRAVENOUS at 01:41

## 2021-05-02 RX ADMIN — RISPERIDONE 2 MG: 1 TABLET, FILM COATED ORAL at 17:11

## 2021-05-02 RX ADMIN — DIVALPROEX SODIUM 500 MG: 125 CAPSULE ORAL at 14:02

## 2021-05-02 RX ADMIN — HYDROMORPHONE HYDROCHLORIDE 2 MG: 1 INJECTION, SOLUTION INTRAMUSCULAR; INTRAVENOUS; SUBCUTANEOUS at 03:38

## 2021-05-02 RX ADMIN — FUROSEMIDE 20 MG: 10 INJECTION, SOLUTION INTRAMUSCULAR; INTRAVENOUS at 08:06

## 2021-05-02 RX ADMIN — POTASSIUM BICARBONATE 25 MEQ: 978 TABLET, EFFERVESCENT ORAL at 14:03

## 2021-05-02 RX ADMIN — ACETAMINOPHEN 650 MG: 325 TABLET, FILM COATED ORAL at 23:28

## 2021-05-02 RX ADMIN — OXYCODONE HYDROCHLORIDE 15 MG: 100 SOLUTION ORAL at 21:39

## 2021-05-02 RX ADMIN — DIVALPROEX SODIUM 500 MG: 125 CAPSULE ORAL at 21:39

## 2021-05-02 RX ADMIN — DULOXETINE HYDROCHLORIDE 60 MG: 60 CAPSULE, DELAYED RELEASE ORAL at 06:42

## 2021-05-02 RX ADMIN — RISPERIDONE 2 MG: 1 TABLET, FILM COATED ORAL at 06:42

## 2021-05-02 RX ADMIN — MIDAZOLAM 8 MG/HR: 5 INJECTION INTRAMUSCULAR; INTRAVENOUS at 16:17

## 2021-05-02 RX ADMIN — CEFEPIME 1 G: 1 INJECTION, POWDER, FOR SOLUTION INTRAMUSCULAR; INTRAVENOUS at 14:03

## 2021-05-02 RX ADMIN — DOCUSATE SODIUM 50 MG AND SENNOSIDES 8.6 MG 2 TABLET: 8.6; 5 TABLET, FILM COATED ORAL at 17:11

## 2021-05-02 RX ADMIN — ACETAMINOPHEN 650 MG: 325 TABLET, FILM COATED ORAL at 14:18

## 2021-05-02 RX ADMIN — DIVALPROEX SODIUM 500 MG: 125 CAPSULE ORAL at 06:42

## 2021-05-02 RX ADMIN — CEFEPIME 1 G: 1 INJECTION, POWDER, FOR SOLUTION INTRAMUSCULAR; INTRAVENOUS at 06:43

## 2021-05-02 RX ADMIN — CARVEDILOL 6.25 MG: 6.25 TABLET, FILM COATED ORAL at 06:46

## 2021-05-02 RX ADMIN — CARVEDILOL 6.25 MG: 6.25 TABLET, FILM COATED ORAL at 17:11

## 2021-05-02 RX ADMIN — OXYCODONE HYDROCHLORIDE 15 MG: 100 SOLUTION ORAL at 14:02

## 2021-05-02 RX ADMIN — ACETAMINOPHEN 650 MG: 325 TABLET, FILM COATED ORAL at 09:20

## 2021-05-02 RX ADMIN — OXYCODONE HYDROCHLORIDE 10 MG: 100 SOLUTION ORAL at 01:20

## 2021-05-02 RX ADMIN — MIDAZOLAM HYDROCHLORIDE 2 MG: 1 INJECTION, SOLUTION INTRAMUSCULAR; INTRAVENOUS at 23:48

## 2021-05-02 RX ADMIN — ACETAMINOPHEN 650 MG: 325 TABLET, FILM COATED ORAL at 03:20

## 2021-05-02 RX ADMIN — PAROXETINE HYDROCHLORIDE 10 MG: 20 TABLET, FILM COATED ORAL at 06:42

## 2021-05-02 RX ADMIN — POTASSIUM CHLORIDE 40 MEQ: 1500 TABLET, EXTENDED RELEASE ORAL at 06:46

## 2021-05-02 ASSESSMENT — PAIN DESCRIPTION - PAIN TYPE
TYPE: ACUTE PAIN

## 2021-05-02 ASSESSMENT — FIBROSIS 4 INDEX
FIB4 SCORE: 0.89
FIB4 SCORE: 0.89

## 2021-05-02 NOTE — PROGRESS NOTES
"Critical Care Progress Note    Date of admission  4/16/2021    Chief Complaint  50 y.o. female the past medical history of pseudotumor cerebri status post  shunt by Dr. Oh, chronic pain with history of placement of nerve stimulator, vasculitis, right anterior chest port for home IV meds with recurrent infection who presented 4/11/2021 with to [facility] with recurrent port infection and was initially treated with vancomycin and Zosyn and then changed to ceftaroline and subsequently switched to nafcillin when MSSA was identified.    Hospital Course  \"50 y.o. female the past medical history of pseudotumor cerebri status post  shunt by Dr. Oh, chronic pain with history of placement of nerve stimulator, vasculitis, right anterior chest port for home IV meds with recurrent infection who presented 4/11/2021 with to [facility] with recurrent port infection. Found to have MSSA bacteremia, endocarditis, septic pulmonary emboli/empyema/pneumatocele, and CSF pleocytosis concerning for  shunt involvement.  Seen by Dr. Oh, NSGY, at [facility] who did not recommend  shunt removal.  Seen by Dr. Vargas here for concern of fluid collection around her spinal stimulator and he recommended against removal. Remains intubated, encephalopathic.    4/16 admitted to ICU  4/17 VD 2, 2 FFP, pRBC overnight; new chest tube per gen surg  4/18 VD 3, TPa/Dornase with 1400 cc from R chest tube    4/26: R chest tube not functioning, d/c it.  broke his leg and going to OR today, so not available currently.  VD 11.  8/30%. RSBI immediately high on SBT attempt. Followed commands for RN  4/27: VD12. 8/30%. Not tolerating wean, high RSBI and tachycardic.   4/28: VD 13. VATS with Dr. Ganser.    4/29: VD 14. 8/30%. Still not tolerating wean d/t severe tachycardia just with SAT. Trialed precedex and she required fentanyl up to 600/hr to tolerate, so back on propofol. Chest tubes -40  4/30: VD 15. 8/30%. Try ketamine/versed as sedative. Severe " tachycardia and HTN with weaning down propofol. Plan for perc trach tomorrow. Chest tubes -40  5/1: perc trach. PICC placed. Chest tubes remain -40.  5/2: Able to spont well on sedation, but very tachy/HTN when sedation is turned down.      Interval Problem Update  Neuro: very tachy/HTN on SAT. Didn't follow on SAT, just grimaced.   HR: -140s  SBP: 110-120s  Tmax: 38.1  GI: BM yest, TF at goal. ?change goal off propofol  I/O: nevarez 810 overnight  Lines: PICC, 2 chest tubes, d/c left subclavian  Mobility: passive ROM  Resp: trach day 2, 24/300/8/30%   Vte: lovenox  PPI/H2:pepcid  Antibx: cefepime prolonged course, MSSA, klebsiella    Replete lytes        Review of Systems  Review of Systems   Unable to perform ROS: Intubated        Vital Signs for last 24 hours   Temp:  [37.5 °C (99.5 °F)-38.3 °C (100.9 °F)] 38.3 °C (100.9 °F)  Pulse:  [] 140  Resp:  [23-40] 36  BP: ()/() 135/78  SpO2:  [95 %-100 %] 96 %    Hemodynamic parameters for last 24 hours       Respiratory Information for the last 24 hours  Vent Mode: APVCMV  Rate (breaths/min): 24  Vt Target (mL): 300  PEEP/CPAP: 8  MAP: 16  Control VTE (exp VT): 295    Physical Exam   Physical Exam  Constitutional:       Appearance: She is ill-appearing.      Interventions: She is sedated and intubated.   HENT:      Mouth/Throat:      Mouth: Mucous membranes are moist.   Eyes:      Pupils: Pupils are equal, round, and reactive to light.   Cardiovascular:      Rate and Rhythm: Regular rhythm. Tachycardia present.      Heart sounds: Murmur present.   Pulmonary:      Effort: She is intubated.      Comments: Improving respiratory movement of R chest    R chest tubes. No air leaks  Abdominal:      General: Bowel sounds are normal.      Tenderness: There is no abdominal tenderness. There is no guarding.   Musculoskeletal:      Right lower leg: Edema present.      Left lower leg: Edema present.      Comments: Generalized anasarca   Skin:     General: Skin  is warm and dry.   Neurological:      Comments: Deeply sedated   Psychiatric:      Comments: Unable to assess         Medications  Current Facility-Administered Medications   Medication Dose Route Frequency Provider Last Rate Last Admin   • HYDROmorphone (Dilaudid) injection 1-2 mg  1-2 mg Intravenous Q2HRS PRN Jeremy M Gonda, M.D.   2 mg at 05/02/21 0338   • carvedilol (COREG) tablet 6.25 mg  6.25 mg Oral BID WITH MEALS Leti Sun M.D.       • midazolam (Versed) 2 MG/2ML injection 2 mg  2 mg Intravenous Q HOUR PRN Leti Sun M.D.   2 mg at 05/02/21 0141   • midazolam (VERSED) premix 125 mg/125 mL NS  0-10 mg/hr Intravenous Continuous Leti Sun M.D. 6 mL/hr at 05/02/21 0511 6 mg/hr at 05/02/21 0511   • oxyCODONE 20 MG/ML concentrate 10 mg  10 mg Oral Q4HRS Leti Sun M.D.   10 mg at 05/02/21 0120   • ketamine 1,000 mg in  mL infusion (critical care only)  0-2.5 mg/kg/hr Intravenous Continuous Leti Sun M.D. 20.7 mL/hr at 05/02/21 0511 0.5 mg/kg/hr at 05/02/21 0511   • labetalol (NORMODYNE/TRANDATE) injection 10 mg  10 mg Intravenous Q4HRS PRN Leti Sun M.D.   10 mg at 05/02/21 0217   • norepinephrine (Levophed) 8 mg in 250 mL NS infusion (premix)  0-30 mcg/min Intravenous Continuous Benoit Loya Jr., D.O.   Stopped at 04/30/21 1245   • enoxaparin (LOVENOX) inj 40 mg  40 mg Subcutaneous DAILY Benoit Loya Jr., D.O.   Stopped at 04/30/21 0600   • ceFEPIme (MAXIPIME) 1 g in  mL IVPB  1 g Intravenous Q8HRS LEYLA Hinton.CADY   Stopped at 05/01/21 2154   • Pharmacy Consult: Enteral tube insertion - review meds/change route/product selection  1 Each Other PHARMACY TO DOSE Elliott Salvador M.D.       • acetaminophen (Tylenol) tablet 650 mg  650 mg Enteral Tube Q4HRS PRN Elliott Salvador M.D.   650 mg at 05/02/21 0320   • magnesium hydroxide (MILK OF MAGNESIA) suspension 30 mL  30 mL Enteral Tube QDAY PRN Elliott Salvador M.D.        And   • senna-docusate (PERICOLACE  or SENOKOT S) 8.6-50 MG per tablet 2 tablet  2 tablet Enteral Tube BID Elliott Salvador M.D.   2 tablet at 05/01/21 1714    And   • polyethylene glycol/lytes (MIRALAX) PACKET 1 Packet  1 Packet Enteral Tube QDAY PRN Elliott Salvador M.D.        And   • bisacodyl (DULCOLAX) suppository 10 mg  10 mg Rectal QDAY PRN Elliott Salvador M.D.       • divalproex (DEPAKOTE SPRINKLE) capsule 500 mg  500 mg Enteral Tube Q8HRS Elliott Salvador M.D.   500 mg at 05/01/21 2124   • DULoxetine (CYMBALTA) capsule 60 mg  60 mg Enteral Tube BID Elliott Salvador M.D.   60 mg at 05/01/21 1714   • PARoxetine (PAXIL) tablet 10 mg  10 mg Enteral Tube QAM Elliott Salvador M.D.   10 mg at 05/01/21 0630   • risperiDONE (RISPERDAL) tablet 2 mg  2 mg Enteral Tube BID Elliott Salvador M.D.   2 mg at 05/01/21 1714   • Respiratory Therapy Consult   Nebulization Continuous RT Man Wahl M.D.       • famotidine (PEPCID) tablet 20 mg  20 mg Enteral Tube Q12HRS Man Wahl M.D.   20 mg at 05/01/21 1714    Or   • famotidine (PEPCID) injection 20 mg  20 mg Intravenous Q12HRS Man Wahl M.D.   20 mg at 04/29/21 1718   • MD Alert...ICU Electrolyte Replacement per Pharmacy   Other PHARMACY TO DOSE Man Wahl M.D.       • lidocaine (XYLOCAINE) 1 % injection 1-2 mL  1-2 mL Tracheal Tube Q30 MIN PRN Man Wahl M.D.       • ipratropium-albuterol (DUONEB) nebulizer solution  3 mL Nebulization Q2HRS PRN (RT) Man Wahl M.D.       • fentaNYL (SUBLIMAZE) injection 25 mcg  25 mcg Intravenous Q HOUR PRN Man Wahl M.D.        Or   • fentaNYL (SUBLIMAZE) injection 50 mcg  50 mcg Intravenous Q HOUR PRN Man Wahl M.D.   50 mcg at 05/02/21 0208    Or   • fentaNYL (SUBLIMAZE) injection 100 mcg  100 mcg Intravenous Q HOUR PRN Man Wahl M.D.   100 mcg at 05/02/21 0315       Fluids    Intake/Output Summary (Last 24 hours) at 5/2/2021 0623  Last data filed at 5/2/2021 0400  Gross per 24 hour    Intake 1663.69 ml   Output 1070 ml   Net 593.69 ml       Laboratory  Recent Labs     04/30/21  0344 05/01/21  0306   ISTATAPH 7.421 7.470   ISTATAPCO2 32.1 33.6   ISTATAPO2 79 76   ISTATATCO2 22 25   KQDHXIR6PVJ 96 96   ISTATARTHCO3 20.9 24.4   ISTATARTBE -3 1   ISTATTEMP 38.2 C 38.2 C   ISTATFIO2 30 30   ISTATSPEC Arterial Arterial   ISTATAPHTC 7.403 7.452   [id number] 86 82         Recent Labs     04/30/21  0344 04/30/21  1149 04/30/21  2348 05/01/21  0551 05/02/21  0510   SODIUM 133*  --   --  134* 136   POTASSIUM 4.0   < > 3.7 3.9 3.2*   CHLORIDE 105  --   --  104 104   CO2 24  --   --  25 23   BUN 14  --   --  12 12   CREATININE 0.19*  --   --  <0.17* <0.17*   MAGNESIUM  --   --   --  1.5 1.7   PHOSPHORUS  --   --   --  2.7 3.1   CALCIUM 7.9*  --   --  8.1* 8.0*    < > = values in this interval not displayed.     Recent Labs     04/30/21 0344 05/01/21  0551 05/02/21  0510   GLUCOSE 121* 92 117*     Recent Labs     04/30/21 0344 05/01/21  0551 05/02/21  0510   WBC 21.3* 16.8* 17.0*   NEUTSPOLYS 90.40* 74.10* 80.70*   LYMPHOCYTES 3.50* 8.60* 6.10*   MONOCYTES 4.40 11.20 8.70   EOSINOPHILS 0.00 0.00 0.00   BASOPHILS 0.00 1.70 0.60     Recent Labs     04/30/21 0344 04/30/21  0344 04/30/21  1605 05/01/21  0551 05/02/21  0510   RBC 2.38*  --   --  2.41* 2.22*   HEMOGLOBIN 7.1*   < > 7.3* 7.1* 6.6*   HEMATOCRIT 22.1*  --   --  22.5* 21.0*   PLATELETCT 482*  --   --  424 403    < > = values in this interval not displayed.       Imaging  X-Ray:  I have personally reviewed the images and compared with prior images.    Assessment/Plan  * Acute bacterial endocarditis- (present on admission)  Assessment & Plan  MSSA  Cultures cleared 4/17  Infected port removed at [facility]   shunt and spinal stimulator could be seeded, NSGY has consulted on both and don't recommend removal  MV and TV vegetations with septic pulmonary emboli  Extended cefepime  ID consulting  No evidence based surgical indications (valvular CHF,  difficult pathogen, etc) so no cardiovascular surgery consult is necessary at this time      Agitation requiring sedation protocol  Assessment & Plan  Patient was on propofol/fentanyl for approximately 2 weeks  When sedation is weaned, she becomes extremely tachycardic and hypotensive  When fentanyl gtt was d/c'ed (even with oxycodone 10mg q4) her pupils became quite dilated, suggesting a component of opiate withdrawal  I believe she needs a long slow taper of both opioid and NORMA agents which I try to achieve as follows:  -Versed gtt starting at a rate of 8mg/hr, decrease by 1mg/hr every 24hours  -oxycodone 15mg q4h plus prn dilaudid pushes  -ketamine gtt, titratable for dissociation during opiate and NORMA withdrawal    Trapped lung  Assessment & Plan  Decortication 4/28  Continue chest tubes to suction and hope lungs expand  R chest is expanding somewhat.  There is now visible respiratory movement in the R chest and some improved expansion on CXR    Empyema of right pleural space (HCC)- (present on admission)  Assessment & Plan  R empyema due to septic pulmonary emboli  Chest tube placement 4/16  At [facility], Upsized 4/18 Dr Benedict, stopped functioning so d/c'ed 4/26  Received TPA/dornase from 4/18-20.  Stopped b/c Hgb dropped requiring transfusion  S/p VATS and decortication 4/28  May need another intervention in future, per Dr. Ganser  Culture from 4/28 pleural fluid growing klebsiella          Acute respiratory failure with hypoxia (HCC)- (present on admission)  Assessment & Plan  Intubated 4/15 for respiratory failure at [facility]  Due to hydro/pneumo, trapped lung, lung abscesses  Lung protective ventilation strategies  Appropriate vent bundles  SAT/SBT  Perc trach 5/1        Acute encephalopathy- (present on admission)  Assessment & Plan  Multifactorial including septic and metabolic  Status post lumbar puncture with cytosis, negative Gram stain and cultures NGTD   shunt for pseudotumor cerebri  Not candidate for MRI  d/t spinal stimulator  Ct repeat no intracranial abnormalities on 4/21  EEG with encephalopathy and no seizures  Minimize sedation as able  Her problem list includes autoimmune cerebritis.  Need to clarify the history of this, as I don't see anything in her prior d/c summaries about an autoimmune cerebritis    Pleural effusion, left  Assessment & Plan  Loculated fluid  Probably parapneumonic fluid from abscesses  CT guided chest tube placed 4/23, fluid sent  Lymph predominant, NGTD  TPA/dornase one dose on 4/24.  Hold d/t dropping Hgb  CT chest 4/25 with resolution of fluid  CT clamped 4/29-no significant accumulation of fluid so d/c 4/30    Fever  Assessment & Plan  Stable fever curve  R pleural space has trapped lung so impossible to completely clear/get source control      Pneumonia  Assessment & Plan  MSSA septic emboli with empyema   VAE  BAL 4/20 klebsiella pneum  BAL 4/24 still heavy growth of klebsiella  4/28 OR pleural fluid specimen growing klebsiella.   On cefepime    CSF pleocytosis- (present on admission)  Assessment & Plan  Status post lumbar puncture 4/13  WBC 53, 82% polys, Glucose 47, protein 97 Gram stain negative and culture negative at [facility]  Neurosurgery aware,  shunt in place  MRI brain requested by neurosurgery at [facility], not candidate d/t stimulator  Repeat ct no embolic lesions  cefepime for CNS penetration  Once her clinical status improves, she will need a long term plan for her  shunt that could have been seeded    Acute blood loss anemia  Assessment & Plan  Hgb dropped again after TPA/dornase on 4/25  No clear source of bleed  D/c TPA/dornase  Hgb stable  Transfuse >7    Tachycardia  Assessment & Plan  Sinus  Gets severely tachycardiac with any attempt at sedation wean  This is likely a combination of prolonged use of sedatives, deconditioning, active cardiopulmonary illness.   I suspect there is some component of withdrawal so I think she needs a long slow taper of both opioid and  marni-ergic agents  HTN and tachycardiac are precluding attempts at weaning from the ventilator, so will add coreg    S/P  shunt- (present on admission)  Assessment & Plan  History of pseudotumor cerebri  History of  shunt by Dr. Oh  Could be seeded by MSSA, Dr. Oh consulted at [facility] [place], recommended MRI but unable given her spinal stimulator      Goals of care, counseling/discussion  Assessment & Plan  Palliative is consulting  I spoke with Benoit on 4/27. I explained my concern that even if Enedelia were to survive this her QOL would likely be quite a bit worse.  She won't be able to have another port and was receiving IV meds for HA 3-4 times weekly.  She also could have worse headaches after this episode of meningitis.  He asked if she could have brain damage, and I explained that was possible but hard to know right now.  He says part of him thinks Enedelia would want to keep fighting and part of him thinks she would say to stop aggressive care because her QOL was already very limited by her headaches. Continue aggressive care for now.    Spinal cord stimulator status  Assessment & Plan  Patient's stimulator is Nuvectra. It is FDA approved as MRI compatible, but the company has gone out of business, so there is no support team to assist with MRI.  Thus, if MRI were performed, our radiologists would need to protocol it correctly to manage the stimulator. The former rep from [employer] is Andre, [phone].  Information obtained from Dr. Mahoney's office, [phone].    Thrombocytosis (HCC)  Assessment & Plan  Probably reactive    Anasarca  Assessment & Plan  Mild diffuse peripheral edema  Probably due to critical illness, hypoalbuminemia, prior volume resuscitation  Gentle diuresis    Pulmonary abscess (HCC)  Assessment & Plan  Septic emboli from TV endocarditis    Bacteremia due to methicillin susceptible Staphylococcus aureus (MSSA)- (present on admission)  Assessment & Plan  Source presumed right anterior chest  line/port with endocarditis  See discussion of endocarditis above        Anemia  Assessment & Plan  Has required a few transfusions  No obvious source of bleed   Transfuse <7    Septic shock (HCC)- (present on admission)  Assessment & Plan  Shock resolved    Hypokalemia  Assessment & Plan  Schedule daily K  Replete prn to >4    History of vasculitis- (present on admission)  Assessment & Plan  Monitor    Prolonged Q-T interval on ECG- (present on admission)  Assessment & Plan  Avoid QT prolonging agents as able  Optimize electrolytes  Cardiac monitoring  EKG    History of complicated migraines- (present on admission)  Assessment & Plan  Home regimen    Hyponatremia  Assessment & Plan  Mild, monitor    Abnormal LFTs  Assessment & Plan  Chronic alk phos elevation  GGT high c/w liver source  Outpatient workup    H/O: CVA (cerebrovascular accident)- (present on admission)  Assessment & Plan  Monitor    Chronic pain syndrome- (present on admission)  Assessment & Plan  Chronic back pain and intractable headaches  History of nerve stimulator   Dr Vargas consulted, no plan to remove stimulator due to her clinical instability     VTE:  lovenox  Ulcer: H2 Antagonist  Lines: Central Line  Ongoing indication addressed and Lopez Catheter  Ongoing indication addressed    I have performed a physical exam and reviewed and updated ROS and Plan today (5/2/2021). In review of yesterday's note (5/1/2021), there are no changes except as documented above.       Discussed patient condition and risk of morbidity and/or mortality with Family, RN, RT, Pharmacy, Dietary, Code status disscussed and Charge nurse / hot rounds.      The patient remains critically ill requiring mechanical ventilation, difficulty weaning from vent, complicated pleural space.  Critical care time = 40 minutes in directly providing and coordinating critical care and extensive data review.  No time overlap and excludes procedures

## 2021-05-02 NOTE — CARE PLAN
Ventilator Daily Summary    Vent Day #17    Trach Day #2    Ventilator settings: 24, 300, +8, 30%    Plan: Continue current ventilator settings and wean mechanical ventilation as tolerated per physician orders.

## 2021-05-02 NOTE — PROGRESS NOTES
Infectious Disease Progress Note    Author: Brian Omer M.D. Date & Time of service: 5/2/2021  4:27 PM  Cefepime 4/23 -  P/O Thoracoscopy & Decortication Day #3     PRIOR Rx:   Zosyn 4/22-4/23 S/P Thoracoscopy & Decortication Day #4  Previous: Unasyn, Zosyn, Vanco, Daptomycin  Ceftaroline: start 4/13-4/15  Nafcillin 2g Q4 hrs 4/15-4/23 4/14: Unable to give name of previous NSG or neurologist. Seems confused, but able to say some sentences fluently.   4/15: Line cultures now growing MSSA. As well as from the blood. Repeat blood culture 4/13+ in both sets. Patient has worsening confusion. CSF fluid analyses suggest pleocytosis. Cultures are no growth from the CSF. Chest CT was ordered this morning indicating a loculated right pleural effusion as well as bilateral septic emboli. Dr. Mack spoke with Dr. Oh yesterday and no surgical intervention unless clinical deterioration.  4/16: Increased respiratory distress.  Tachypneic.  CT with loculated collection.  Dr Resendiz not available.  No thoracic surgeon available.  Plans to have pulmonary place CT.  Transferring to ICU.  4/17: Transferred to [facility] last night. Hgb dropped to 6.4 requiring PRBC transfusion. Requiring 2 pressors. Fio2 50%. Febrile 38.8. Pending OR decortication of empyema and hemothorax. Chest tube placed at [facility] shows 8,371 WBC, ,000, 74% N  4/18: Chest tube was upsized by surgery yesterday, no decortication. WBC 22k. Fio2 40%. Vasopressin. Levophed down to 2mcg. Afebrile 24 hrs. Last fever 38.6 on 4/16.   4/19: Still on vent. Levo 3 mcg, vasopressin. Afebrile 72 hours. WBC 21k. BC 4/17 NGTD x 48 hours. Unable to do MRI brain given neurostimulator per RN.   4/20: Remaining afebrile. Hb 6.2 and undergoing transfusion today.WBC 24,100, 5% bands.1700 ml CT output. Copious bloody ET secretions. No pressors. Receiving dornase and TPA per CT. Right pleural effusion not large per CXR today.   4/21: WBC 31k. Bronchoscopy yesterday showing  blood. Cultures sent. TPA on hold per RN due to arvind blood from chest tube requiring PRBC transfusion for hgb 6. Pending chest CT today. Per , spinal stimulator is non-MRI compatible. Levophed 10mcg. 30% Fio2. Afebrile.   4/22: Fever 101.3 this am. WBC 20,300 down from 32,200 yesterday. BAL growing Klebsiella pneumoniae but susceptibility panel not set up until today. CTA of brain shows no lesion. CT of chest shows enlarging cavitary lung lesions bilat and large loculated new left pleural effusion and large hydropneumothorax on right. TPA & dornase infusions of right chest ended 4/20 after considerable bleeding requiring transfusion. Hb now down again to 6.8.  4/23: WBC 23k. Fio2 40%. Tmax 38.1. US of stiumlator shows small fluid collection but no drainable abscess. Pending MICAH today. Pending L chest tube placement  4/24: Continue fever 100.8-101.8. WBC 16,600. MICAH shows large vegetations on both tricuspid valve and mitral valve with mild TR & MR.  No aortic root abscess. Left chest tube placed yesterday yielding 8 ml of old bloody fluid. Bronch today revealed copious thick maroon secretions in distal trachea and both mainstem bronchi. On the right these were obstructive. Remaining off pressors. Last positive blood cultures (MSSA) were 4/16, negative 4/17.  4/25: Fever 100.6 this AM. wBC 13,300. Hb 6.6. CT: right hydropneumothorax increasing, right bronchopleural fistula, mediastinal shift ot the left , multiple cavitary pulmonary nodules, 3.1 cm left basilar mass, splenomegaly. CT of head shows dural thickening over the clivus. Lac+ GNR >100,000 col/ml growing from BAL of 4/24, presumed Klebsiella. Left peural fluid culture negative from 4/23.  4/26: Fever 100.4 last night. WBC 13,500. Hb 7.4. Plts 502,000. ESR >120. UA fairly clear except 30 mg% protein, 2-5 wbc and rare rbc. CXR unchanged except more prominent pulmonary edema. GNR in BAL may be a different species of Klebsiella, and resistant to  ampicillin & tmp-sulfa; confirmation pending.  : Fever 100.8 last night. WBC 27,700. Hb 6.6. Plts 482,000. Creat 0.19. Kleb pneum in BAL on  is resistant to ampicillin & tmp-sulfa but otherwise sensitive. O2 sat 96% on FIO2 30% now, PEEP 8. Has been re-evaluated by thoracic surgeon, Dr. Ganser, who believes she will not wean without decortication on the right. Surgery anticipated today.  : S/P right thoracoscopy with decortication with cultures NG at 24 hrs.  : Had a bronch due to hypoxia and hypotension. CXR with worsening right hemithorax pulmonary opacities. Bloody mucous plugs removed. Pressors started. Febrile this am. Tachy in 130s. Pressors just removed. Tolerating vent, but not a SBT candidate at the moment.  : Small air leak CT-2 every ~ 2/3 breathes. The K. pneumoniae from her thoracoscopy is sensitive to her cefepime so no antibiotic change needed.   : No air leak today she tolerated 2  hrs of spontaneous breathing with support today.    Interval History:  Past 24 hrs reviewed with RN.    Labs Reviewed, Medications Reviewed, Radiology Reviewed, Wound Reviewed, Fluids Reviewed and GI Nutrition Reviewed.    Review of Systems:  Review of Systems   Unable to perform ROS: Intubated       Hemodynamics:  Temp (24hrs), Av.1 °C (100.5 °F), Min:37.5 °C (99.5 °F), Max:38.3 °C (100.9 °F)  Temperature: (!) 38.2 °C (100.8 °F), Monitored Temp: 38.2 °C (100.8 °F)  Pulse  Av.5  Min: 40  Max: 149   Blood Pressure: 141/83, Arterial BP: 153/79      Physical Exam:  Physical Exam  Vitals and nursing note reviewed.   Constitutional:       General: She is not in acute distress.     Appearance: Normal appearance.   HENT:      Head: Normocephalic and atraumatic.   Cardiovascular:      Rate and Rhythm: Regular rhythm. Tachycardia present.      Heart sounds: No murmur.   Pulmonary:      Effort: Pulmonary effort is normal. No respiratory distress.      Breath sounds: Normal breath sounds. No wheezing or  rhonchi.      Comments: Full support with weaning trials. No air leak from CT-2 today.  Abdominal:      General: Abdomen is flat. There is no distension.      Palpations: Abdomen is soft.      Tenderness: There is no abdominal tenderness. There is no guarding or rebound.   Skin:     General: Skin is warm and dry.   Neurological:      Comments: Sedated but grimaces to pain.         Meds:    Current Facility-Administered Medications:   •  HYDROmorphone  •  furosemide  •  oxyCODONE  •  carvedilol  •  [START ON 5/3/2021] potassium chloride SA  •  midazolam  •  midazolam  •  ketamine (KETALAR) 500 mg in 250 mL infusion (critical care only)  •  labetalol  •  enoxaparin (LOVENOX) injection  •  cefepime  •  Pharmacy  •  acetaminophen  •  senna-docusate **AND** polyethylene glycol/lytes **AND** magnesium hydroxide **AND** bisacodyl  •  divalproex  •  DULoxetine  •  PARoxetine  •  risperiDONE  •  Respiratory Therapy Consult  •  famotidine **OR** famotidine  •  MD Alert...Adult ICU Electrolyte Replacement per Pharmacy  •  lidocaine  •  ipratropium-albuterol    Labs:  Recent Labs     04/30/21  0344 04/30/21  0344 04/30/21  1605 05/01/21  0551 05/02/21  0510   WBC 21.3*  --   --  16.8* 17.0*   RBC 2.38*  --   --  2.41* 2.22*   HEMOGLOBIN 7.1*   < > 7.3* 7.1* 6.6*   HEMATOCRIT 22.1*  --   --  22.5* 21.0*   MCV 92.9  --   --  93.4 94.6   MCH 29.8  --   --  29.5 29.7   RDW 58.5*  --   --  58.6* 56.2*   PLATELETCT 482*  --   --  424 403   MPV 9.1  --   --  8.8* 8.8*   NEUTSPOLYS 90.40*  --   --  74.10* 80.70*   LYMPHOCYTES 3.50*  --   --  8.60* 6.10*   MONOCYTES 4.40  --   --  11.20 8.70   EOSINOPHILS 0.00  --   --  0.00 0.00   BASOPHILS 0.00  --   --  1.70 0.60   RBCMORPHOLO Present  --   --  Present  --     < > = values in this interval not displayed.     Recent Labs     04/30/21  0344 04/30/21  1149 04/30/21  2348 05/01/21  0551 05/02/21  0510   SODIUM 133*  --   --  134* 136   POTASSIUM 4.0   < > 3.7 3.9 3.2*   CHLORIDE 105  --    --  104 104   CO2 24  --   --  25 23   GLUCOSE 121*  --   --  92 117*   BUN 14  --   --  12 12    < > = values in this interval not displayed.     Recent Labs     04/30/21  0344 05/01/21  0551 05/02/21  0510   CREATININE 0.19* <0.17* <0.17*       Imaging:  CT-CHEST (THORAX) W/O    Result Date: 4/25/2021 4/25/2021 11:13 AM HISTORY/REASON FOR EXAM:  follow up empyemas. TECHNIQUE/EXAM DESCRIPTION: CT scan of the chest without contrast. Noncontrast helical scanning of the chest was obtained from the lung apices through the adrenal glands. Low dose optimization technique was utilized for this CT exam including automated exposure control and adjustment of the mA and/or kV according to patient size. COMPARISON: 4/21/21. FINDINGS: Evaluation of parenchymal and vascular structures is limited by the lack of intravenous contrast. Endotracheal tube terminates at the level of the aortic arch. Enteric tube passes into the stomach. Left central line terminates in the SVC. There is mediastinal shift to the left. There is a small pericardial effusion. There is a small loculated left pleural effusion. There is a loculated right hydropneumothorax which is increased in size compared to prior. Layering pleural fluid is increased at the right lung base. A chest tube is seen in the posterior right hemithorax. There is atelectasis of a large portion of the right lung. There are bilateral cavitary lesions again noted. The solid components of the cavitary lesions are slightly decreased. There is a 3.1 cm noncavitary masslike density at the left lung base. Left pleural effusion is decreased in size compared to prior. There is a pigtail catheter in the medial left lung base. There are multiple foci of air extending from the right lung to the pleural space suspicious for a bronchopleural fistula. Precarinal lymph node measures 7 mm in short axis dimension. Evaluation of the darnell is limited by the lack of intravenous contrast. There are small  axillary lymph nodes bilaterally. There is soft tissue air on the right. Limited views were obtained of the upper abdomen. Spleen is enlarged. Neurostimulator electrodes are seen. Mild degenerative changes are seen in the spine. There is soft tissue edema, right greater than left.     Large loculated right hydropneumothorax with interval increase in size of the pneumothorax and pleural fluid. Right chest tube is in the posterior right thorax. There is mediastinal shift to the left compatible with tension. Small pericardial effusion. Small loculated left pleural effusion with overlying atelectasis/consolidation. Pigtail catheter is seen at the medial left lung base. Pleural effusion has decreased in size compared to prior. There are multiple foci of air extending from the right lung to the pleural space suspicious for a bronchopleural fistula. Multiple cavitary lesions bilaterally with mild interval decrease of the solid component of the cavitary nodules. Noncavitary 3.1 cm masslike density is seen at the left lung base. Findings may be related to septic emboli, malignancy or multifocal abscesses. Short interval follow-up recommended. Soft tissue air on the right is again seen. Splenomegaly Findings discussed with Leti Sun.    CT-CHEST (THORAX) W/O    Result Date: 4/21/2021 4/21/2021 3:26 PM HISTORY/REASON FOR EXAM: Follow-up loculated effusion are normal thorax. TECHNIQUE/EXAM DESCRIPTION: CT scan of the chest without contrast. Noncontrast helical scanning of the chest was obtained from the lung apices through the adrenal glands. Low dose optimization technique was utilized for this CT exam including automated exposure control and adjustment of the mA and/or kV according to patient size. COMPARISON: 4/15/2021 FINDINGS: The study is limited due to non-use of intravenous contrast. The mediastinum and hilum is not well evaluated. There is an endotracheal tube, left subclavian central line and a feeding tube  present. There is a large loculated right-sided hydropneumothorax with a chest tube present within that cavity. This is located in the area of a previously seen pleural effusion. There is a component extending inferomedially as well. There is extensive subcutaneous emphysema on the right. There is some thickening of the pleura in that area. There are multiple bilateral cavitary masses present bilaterally with interval increase in cavitation. These measure up to 3 cm in size. There is a loculated left pleural effusion seen as well. This has increased from comparison. There is no evidence of mediastinal or hilar adenopathy. There is a small amount of pericardial fluid. The spleen is enlarged.     1.  Interval placement of a right-sided chest tube into a large loculated right-sided pleural effusion. There is now seen a large right-sided hydropneumothorax in the area that previously seen fluid. The chest tube extends into that hydropneumothorax. There is some residual pleural fluid seen as well. A hydropneumothorax is somewhat loculated with components most prominently inferiorly and medially. 2.  New loculated left pleural effusion. 3.  Again seen multiple bilateral cavitary pulmonary masses which are more prominent than previously seen with increasing cavitation and measure up to 3 cm size. Differential diagnosis includes septic emboli, multifocal abscesses and neoplasm. 4.  Large amount of subcutaneous emphysema on the right. 5.  Splenomegaly. 6.  Multiple support devices. Fleischner Society pulmonary nodule recommendations:     CT-CTA HEAD WITH & W/O-POST PROCESS    Result Date: 4/21/2021 4/21/2021 3:38 PM HISTORY/REASON FOR EXAM:  Endocarditis and sepsis. Infected  shunt. TECHNIQUE/EXAM DESCRIPTION: CT angiogram of the [place] without and with contrast.  Initial precontrast images were obtained of the head from the skull base through the vertex.  Postcontrast images were obtained of the [place]  following the power injection of nonionic contrast at 5.0 mL/sec. Thin-section helical images were obtained with overlapping reconstruction interval. Coronal and sagittal multiplanar volume reformats were generated.  3D angiographic images were reviewed on PACS.  Maximum intensity projection (MIP) images were generated and reviewed. 80 mL of Omnipaque 350 nonionic contrast was injected intravenously. Low dose optimization technique was utilized for this CT exam including automated exposure control and adjustment of the mA and/or kV according to patient size. COMPARISON:  4/8/2021 FINDINGS: There is normal variant fetal origin posterior cerebral arteries. Vertebral arteries are patent. There is metallic focus seen adjacent to the right C2 vertebral body arch posteriorly. Basilar artery is patent. The anterior circulation shows no stenotic or occlusive lesion. No aneurysm is evident about the [place]. There is a right frontal ventriculoperitoneal shunt catheter present. Lateral and third ventricular volume is mildly increased. No evidence of intracranial hemorrhage. There has been prior right frontal craniotomy. 3D angiographic/MIP images of the vasculature confirm the vascular findings as described above.     1.  Patent carotid and vertebral arteries. 2.  Again seen right frontal the jugular peritoneal shunt catheter. There is mild increase in volume of the lateral and third ventricles.    CT-CTA HEAD WITH & W/O-POST PROCESS    Result Date: 4/8/2021 4/8/2021 1:37 PM HISTORY/REASON FOR EXAM:  Headache, suspected intracranial hemorrhage TECHNIQUE/EXAM DESCRIPTION: CT angiogram of the [place] without and with contrast.  Initial precontrast images were obtained of the head from the skull base through the vertex.  Postcontrast images were obtained of the [place] following the power injection of nonionic contrast at 5.0 mL/sec. Thin-section helical images were obtained with overlapping  reconstruction interval. Coronal and sagittal multiplanar volume reformats were generated.  3D angiographic images were reviewed on PACS.  Maximum intensity projection (MIP) images were generated and reviewed. 100 mL of Omnipaque 350 nonionic contrast was injected intravenously. Low dose optimization technique was utilized for this CT exam including automated exposure control and adjustment of the mA and/or kV according to patient size. COMPARISON:  None. FINDINGS: The posterior circulation shows the distal vertebral arteries to be patent. The vertebrobasilar confluence is intact. The basilar artery is patent. No aneurysm or occlusive lesion is evident. The anterior circulation shows no stenotic or occlusive lesion. No aneurysm is evident about the [place]. The brain is discussed separately. 3D angiographic/MIP images of the vasculature confirm the vascular findings as described above.     No large vessel occlusion, high-grade stenosis or aneurysm of the [place].    CT-CTA NECK WITH & W/O-POST PROCESSING    Result Date: 4/8/2021 4/8/2021 1:37 PM HISTORY/REASON FOR EXAM:  Stroke, follow up TECHNIQUE/EXAM DESCRIPTION: CT angiogram of the neck with contrast. Postcontrast images were obtained of the neck from the great vessels through the skull base following the power injection of nonionic contrast at 5.0 mL/sec. Thin-section helical images were obtained with overlapping reconstruction interval. Coronal and oblique multiplanar volume reformats were generated. Cervical internal carotid artery percent stenosis is calculated using the standard method according to the NASCET criteria wherein a segment of uniform caliber mid or distal cervical internal carotid is used as the reference denominator. 3D angiographic images were reviewed on PACS.  Maximum intensity projection (MIP) images were generated and reviewed 100 mL of Omnipaque 350 nonionic contrast was injected intravenously. Low dose optimization  technique was utilized for this CT exam including automated exposure control and adjustment of the mA and/or kV according to patient size. COMPARISON:  3/8/2020. FINDINGS: Left-sided aortic arch with patent great vessel origins. The right common carotid artery, cervical carotid bifurcation, and cervical internal carotid artery show no significant stenosis. There is no evidence of dissection or aneurysm. The left common carotid artery, cervical carotid bifurcation, and cervical internal carotid artery show no significant stenosis. There is no evidence of dissection or aneurysm. The cervical vertebral arteries are patent bilaterally. The neck soft tissues and lung apices in the field of view are unremarkable. Spinal cord stimulator leads. Severe cervical spondylosis at C5-C6, with associated C5 and C6 vertebral body height loss. Grade 1 degenerative retrolisthesis at this level. 3D angiographic/MIP images of the vasculature confirm the vascular findings as described above.     No high-grade stenosis, large vessel occlusion, aneurysm or dissection.    CT-HEAD W/O    Result Date: 4/24/2021 4/23/2021 11:11 PM HISTORY/REASON FOR EXAM:  Altered mental status. Seizure. TECHNIQUE/EXAM DESCRIPTION AND NUMBER OF VIEWS: CT of the head without contrast. The study was performed on a helical multidetector CT scanner. Contiguous 2.5 mm axial sections were obtained from the skull base through the vertex. Up to date radiation dose reduction adjustments have been utilized to meet ALARA standards for radiation dose reduction. COMPARISON:  4/21/2021 FINDINGS: Lateral ventricles are normal in size and symmetric. RIGHT frontal ventriculostomy catheter in place, terminating in the frontal horn of LEFT lateral ventricle as seen previously. Low attenuation is seen along the catheter tract, unchanged. Cortical sulci are mildly prominent. No significant mass effect or midline shift. Basal cisterns are patent. No evidence for intracranial  hemorrhage. Apparent focal dural thickening overlying the clivus. Calvaria are intact. Visualized orbits are unremarkable. Visualized mastoid air cells are clear. Visualized paranasal sinuses are unremarkable.     1.  RIGHT frontal ventriculostomy catheter unchanged in position. 2.  Mild cortical atrophy. 3.  No intracranial hemorrhage. 4.  Apparent dural thickening overlying the clivus.  Consider further evaluation with contrast-enhanced MRI.    CT-HEAD W/O    Result Date: 4/8/2021 4/8/2021 1:37 PM HISTORY/REASON FOR EXAM:  Neuro deficit, acute, stroke suspected. Right-sided numbness and weakness. TECHNIQUE/EXAM DESCRIPTION AND NUMBER OF VIEWS: CT of the head without contrast. The study was performed on a helical multidetector CT scanner. Contiguous axial sections were obtained from the skull base through the vertex. Up to date radiation dose reduction adjustments have been utilized to meet ALARA standards for radiation dose reduction. COMPARISON:  March 8, 2020 FINDINGS: Right frontal  shunt remains in place with tip crossing the midline into the left lateral ventricle. Ventricle size is stable. No hydrocephalus. There is no evidence of intracranial hemorrhage, midline shift or mass effect. No extra-axial fluid collections identified. Paranasal sinuses in the field of view are unremarkable. Mastoids in the field of view are unremarkable.     Stable appearance of right frontal  shunt crossing the midline into the left lateral ventricle. No evidence of intracranial hemorrhage.    DX-CHEST-LIMITED (1 VIEW)    Result Date: 4/27/2021 4/27/2021 12:15 PM HISTORY/REASON FOR EXAM:  Shortness of Breath TECHNIQUE/EXAM DESCRIPTION AND NUMBER OF VIEWS: Single portable view of the chest. COMPARISON: CT 4/27/2021 FINDINGS: ET tube, feeding tube and left pigtail catheter are redemonstrated. Redemonstration of right hydropneumothorax. Multiple cavities in the left lungs are redemonstrated. Stable cardiopericardial  silhouette.     No significant interval change.    DX-CHEST-LIMITED (1 VIEW)    Result Date: 4/23/2021 4/23/2021 6:46 PM HISTORY/REASON FOR EXAM: Pleural Effusion; Post op chest tube. TECHNIQUE/EXAM DESCRIPTION AND NUMBER OF VIEWS: Single portable view of the chest. COMPARISON: 4/22/2021 FINDINGS: Right thoracostomy tube terminates over the right hilus and there is similar soft tissue gas laterally. Right hydropneumothorax is again suggested. Enteric tube extends below the radiograph.  Left basilar pigtail catheter. Left subclavian line terminates over the SVC. Endotracheal tube is between the clavicular heads. Cardiomediastinal contours are stable, not enlarged Lungs demonstrate stable right lung partial collapse with areas of central scarring and consolidation. A left mid lung zone consolidation is stable. There is improved hemidiaphragm visualization indicating diminished left basilar atelectasis. Small left pleural effusion has diminished     Interval left basilar pigtail catheter with diminished atelectasis, pleural fluid Stable right hydropneumothorax with thoracostomy tube in place, considerable soft tissue gas and partial lung collapse    DX-CHEST-LIMITED (1 VIEW)    Result Date: 4/22/2021 4/22/2021 8:10 AM HISTORY/REASON FOR EXAM:  Shortness of Breath; endocarditis with embolic lesions, hydropneumothorax rt side, loculated pleural effusions bilateral TECHNIQUE/EXAM DESCRIPTION AND NUMBER OF VIEWS: Single portable view of the chest. COMPARISON: 4/21/2021 FINDINGS: Lines and tubes are stable. Cardiomediastinal silhouette is stable. A large loculated right hydropneumothorax is again noted, likely not significantly changed given differences in technique. There is some compressive atelectasis within the right lung. Right chest tube is in place with the side-port at the level of the chest wall/pleural space. Prominent right chest wall soft tissue emphysema. There is a small left pneumothorax pleural effusion.  There is mildly improved aeration in the left lung with persistent hazy opacity throughout the left lung which could be related to combination of atelectasis, edema and/or infection. No left pneumothorax.     1.  Large right hydropneumothorax with right-sided chest tube in place, likely stable to slightly decreased from prior study. 2.  Small left pleural effusion. Mild improved aeration in the left lung. 3.  Bilateral pulmonary opacities which could be related to combination of atelectasis, edema and/or infection..    DX-CHEST-LIMITED (1 VIEW)    Result Date: 4/21/2021 4/21/2021 3:16 PM HISTORY/REASON FOR EXAM: Central line placement. TECHNIQUE/EXAM DESCRIPTION AND NUMBER OF VIEWS: Single AP view of the chest. COMPARISON: 4/20/2021 FINDINGS: Endotracheal tube is seen. Feeding tube extends into the stomach. There is a left subclavian central line with the tip overlying the upper superior vena cava. Heart is enlarged. There is a right-sided chest tube with likely right-sided pneumothorax. There are bilateral pulmonary infiltrates. There is extensive subcutaneous gas on the right.     1.  Support devices as described above. 2.  Right chest tube present with a again seen right-sided pneumothorax, possibly increased in size and loculated. 3.  Worsening bilateral pulmonary infiltrates.    DX-CHEST-LIMITED (1 VIEW)    Result Date: 4/20/2021 4/20/2021 1:02 PM HISTORY/REASON FOR EXAM:  Abnormal finding of lung field TECHNIQUE/EXAM DESCRIPTION AND NUMBER OF VIEWS: Single AP view of the chest. COMPARISON: 4/20/2021 FINDINGS: There is a right chest tube. Right central line and enteric tube are again noted. Catheter projects over the right thorax. The cardiomediastinal silhouette is stable. There are extensive bilateral airspace opacities. Loculated right pneumothorax is decreased in size compared to prior. There is a small left pleural effusion. There is soft tissue air on the right. There is a small loculated right pleural  effusion.     Loculated right pneumothorax is decreased in size compared to prior. Small left pleural effusion. Small loculated right pleural effusion. Extensive bilateral airspace opacities are not significantly changed. Soft tissue air on the right is again noted.    DX-CHEST-LIMITED (1 VIEW)    Result Date: 4/20/2021 4/20/2021 3:15 AM HISTORY/REASON FOR EXAM:  pleural effusions rt side with TPA/Dornase therapy. Right pneumothorax TECHNIQUE/EXAM DESCRIPTION AND NUMBER OF VIEWS: Single portable view of the chest. COMPARISON: 4/17/2021 FINDINGS: Lines and tubes are stable. Cardiomediastinal silhouette is stable. Right chest tube is in place. The side-port is within the right chest wall just outside of the pleural cavity. There is increased subcutaneous emphysema. Decreased right pleural effusion with some increased air. Interstitial and ill-defined pulmonary opacities are not significantly changed compartments of mild increased opacity in the left lung which could be related to atelectasis. No left pneumothorax. Bones are unchanged.     Decreased partially loculated right pleural fluid with increased pleural air. Right chest tube is in place. Increased hazy opacity in the left lung possibly atelectasis.     DX-CHEST-PORTABLE (1 VIEW)    Result Date: 5/2/2021 5/2/2021 2:12 AM HISTORY/REASON FOR EXAM:  Intubated TECHNIQUE/EXAM DESCRIPTION AND NUMBER OF VIEWS: Single portable view of the chest. COMPARISON: 5/1/2021 FINDINGS: Tubes and lines: Tracheotomy tube, feeding tube, 2 right chest tubes and bilateral central venous catheter are redemonstrated. Redemonstration of hazy opacity throughout the right lung Layering right pleural effusion. Stable cardiopericardial silhouette.     1. No significant interval change.    DX-CHEST-PORTABLE (1 VIEW)    Result Date: 5/1/2021 5/1/2021 12:46 AM HISTORY/REASON FOR EXAM: Intubated TECHNIQUE/EXAM DESCRIPTION:  Single AP view of the chest. COMPARISON: Yesterday FINDINGS:  Position of medical devices appears stable. The cardiac silhouette appears within normal limits. The mediastinal contour appears within normal limits.  The central  pulmonary vasculature appears prominent and indistinct. The lungs appear well expanded bilaterally.  Diffuse scattered hazy pulmonary parenchymal opacities are seen. Multiple cavitary lesions in the left lung are noted. Partial opacification of the right lung. The bony structures appear age-appropriate.     1.  Pulmonary edema and/or infiltrates, similar to prior study. 2.  Stable opacification right lung, likely effusion. Thoracostomy tubes are in place. 3.  Multiple cavitary appearing left pulmonary lesions, see CT performed April 27, 2021 for further characterization    DX-CHEST-PORTABLE (1 VIEW)    Result Date: 4/30/2021 4/30/2021 2:52 AM HISTORY/REASON FOR EXAM: Intubated TECHNIQUE/EXAM DESCRIPTION:  Single AP view of the chest. COMPARISON: Yesterday FINDINGS: Position of medical devices appears stable. The cardiac silhouette appears within normal limits. The mediastinal contour appears within normal limits.  The central  pulmonary vasculature appears prominent and indistinct. The lungs appear well expanded bilaterally.  Diffuse scattered hazy pulmonary parenchymal opacities are seen. . Multiple cavitary lesions in the left lung are noted. Partial opacification of the right lung. The bony structures appear age-appropriate.  Soft tissue gas in the right chest wall is seen.     1.  Pulmonary edema and/or infiltrates, similar to prior study. 2.  Single opacification right lung, likely effusion. Thoracostomy tubes are in place. 3.  Multiple cavitary appearing left pulmonary lesions, see CT performed April 27, 2021 for further characterization 4.  Soft tissue gas in the right chest wall    DX-CHEST-PORTABLE (1 VIEW)    Result Date: 4/29/2021 4/29/2021 8:50 PM HISTORY/REASON FOR EXAM: Shortness of Breath TECHNIQUE/EXAM DESCRIPTION:  Single AP view  of the chest. COMPARISON: Today at 0428 FINDINGS: Position of medical devices appears stable. The cardiac silhouette appears within normal limits. The mediastinal contour appears within normal limits.  The central  pulmonary vasculature appears prominent and indistinct. The lungs appear well expanded bilaterally.  Diffuse scattered hazy pulmonary parenchymal opacities are seen. . Multiple cavitary lesions in the left lung are noted. Increased opacification of the right lung. The bony structures appear age-appropriate.  Soft tissue gas in the right chest wall is seen.     1.  Pulmonary edema and/or infiltrates, similar to prior study. 2.  Increased opacification right lung, likely increased effusion. Thoracostomy tubes are in place. 3.  Multiple cavitary appearing left pulmonary lesions, see CT performed April 27, 2021 for further characterization 4.  Soft tissue gas in the right chest wall    DX-CHEST-PORTABLE (1 VIEW)    Result Date: 4/29/2021 4/29/2021 3:43 AM HISTORY/REASON FOR EXAM: Intubated TECHNIQUE/EXAM DESCRIPTION:  Single AP view of the chest. COMPARISON: Yesterday FINDINGS: Position of medical devices appears stable. The cardiac silhouette appears within normal limits. The mediastinal contour appears within normal limits.  The central  pulmonary vasculature appears prominent and indistinct. The lungs appear well expanded bilaterally.  Diffuse scattered hazy pulmonary parenchymal opacities are seen. Residual right pneumothorax is seen, similar to prior study. Multiple cavitary lesions in the left lung are noted. Veil-like opacities are seen in the right lung base. The bony structures appear age-appropriate.  Soft tissue gas in the right chest wall is seen.     1.  Pulmonary edema and/or infiltrates, similar to prior study. 2.  Right pneumothorax, stable compared to prior study, right thoracostomy tubes remain in place 3.  Multiple cavitary appearing left pulmonary lesions, see CT performed April 27,  2021 for further characterization 4.  Soft tissue gas in the right chest wall    DX-CHEST-PORTABLE (1 VIEW)    Result Date: 4/28/2021 4/28/2021 8:57 PM HISTORY/REASON FOR EXAM: post decortication TECHNIQUE/EXAM DESCRIPTION:  Single AP view of the chest. COMPARISON: Yesterday FINDINGS: Interval placement of 2 right thoracostomy tubes is seen.   Otherwise medical device position is stable. The cardiac silhouette appears within normal limits. The mediastinal contour appears within normal limits.  The central  pulmonary vasculature appears prominent and indistinct. The lungs appear well expanded bilaterally.  Diffuse scattered hazy pulmonary parenchymal opacities are seen. Residual right pneumothorax is seen, similar to prior study. Multiple cavitary lesions in the left lung are noted. Veil-like opacities are seen in the right lung base. The bony structures appear age-appropriate.  Soft tissue gas in the right chest wall is seen.     1.  Pulmonary edema and/or infiltrates, similar to prior study. 2.  Right pneumothorax, interval decreased compared to prior study, interval placement of right thoracostomy tubes is noted. 3.  Multiple cavitary appearing left pulmonary lesions, see CT performed yesterday for further characterization 4.  Soft tissue gas in the right chest wall    DX-CHEST-PORTABLE (1 VIEW)    Result Date: 4/28/2021 4/28/2021 2:33 AM HISTORY/REASON FOR EXAM: Intubated TECHNIQUE/EXAM DESCRIPTION:  Single AP view of the chest. COMPARISON: Yesterday FINDINGS: Interval removal of right thoracostomy tube is seen. Otherwise medical device position is stable. The cardiac silhouette appears within normal limits. The mediastinal contour appears within normal limits.  The central  pulmonary vasculature appears prominent and indistinct. The lungs appear well expanded bilaterally.  Diffuse scattered hazy pulmonary parenchymal opacities are seen. Residual right pneumothorax is seen, similar to prior study. Multiple  cavitary lesions in the left lung are noted. Veil-like opacities are seen in the right lung base. The bony structures appear age-appropriate.  Soft tissue gas in the right chest wall is seen.     1.  Pulmonary edema and/or infiltrates, similar to prior study. 2.  Right pneumothorax, stable compared to prior study, interval removal of right thoracostomy tube is noted. 3.  Multiple cavitary appearing left pulmonary lesions, see CT performed yesterday for further characterization 4.  Soft tissue gas in the right chest wall    DX-CHEST-PORTABLE (1 VIEW)    Result Date: 4/27/2021 4/27/2021 2:28 AM HISTORY/REASON FOR EXAM: Intubated TECHNIQUE/EXAM DESCRIPTION:  Single AP view of the chest. COMPARISON: Yesterday FINDINGS: Interval removal of right thoracostomy tube is seen. Otherwise medical device position is stable. The cardiac silhouette appears within normal limits. The mediastinal contour appears within normal limits.  The central  pulmonary vasculature appears prominent and indistinct. The lungs appear well expanded bilaterally.  Diffuse scattered hazy pulmonary parenchymal opacities are seen. Residual right pneumothorax is seen, similar to prior study. Multiple cavitary lesions in the left lung are noted. Veil-like opacities are seen in the right lung base. The bony structures appear age-appropriate.  Soft tissue gas in the right chest wall is seen.     1.  Pulmonary edema and/or infiltrates, similar to prior study. 2.  Right pneumothorax, somewhat decreased to prior study, interval removal of right thoracostomy tube is noted. 3.  Multiple cavitary appearing left pulmonary lesions, see CT performed yesterday for further characterization 4.  Soft tissue gas in the right chest wall    DX-CHEST-PORTABLE (1 VIEW)    Result Date: 4/26/2021 4/26/2021 2:56 AM HISTORY/REASON FOR EXAM: Intubated TECHNIQUE/EXAM DESCRIPTION:  Single AP view of the chest. COMPARISON: April 24, 2021 FINDINGS: Position of medical devices  appears stable. The cardiac silhouette appears within normal limits. The mediastinal contour appears within normal limits.  The central  pulmonary vasculature appears prominent and indistinct. The lungs appear well expanded bilaterally.  Diffuse scattered hazy pulmonary parenchymal opacities are seen. Residual right pneumothorax is seen, similar to prior study. Multiple cavitary lesions in the left lung are noted. Veil-like opacities are seen in the right lung base. The bony structures appear age-appropriate.     1.  Pulmonary edema and/or infiltrates, similar to prior study. 2.  Right pneumothorax, similar to prior study with thoracostomy tube in place. 3.  Multiple cavitary appearing left pulmonary lesions, see CT performed yesterday for further characterization    DX-CHEST-PORTABLE (1 VIEW)    Result Date: 4/24/2021 4/24/2021 12:30 AM HISTORY/REASON FOR EXAM:  Consistent low saturations, check chest tube placement. TECHNIQUE/EXAM DESCRIPTION AND NUMBER OF VIEWS: Single portable view of the chest. COMPARISON: 4/23/2021 6:57 PM FINDINGS: Cardiac contour is stable. Endotracheal tube in similar position. Feeding tube in place however tip is off the image. Small caliber chest tube projects at the LEFT lung base medially as on prior. Tiny LEFT apical pneumothorax present. RIGHT-sided chest tube again present with tip at the level of the hilum, unchanged. Large air collections again seen in the RIGHT hemithorax, unchanged. RIGHT chest wall emphysema.     No significant change from prior exam.    DX-CHEST-PORTABLE (1 VIEW)    Result Date: 4/19/2021 4/19/2021 3:06 AM HISTORY/REASON FOR EXAM: . Right pneumothorax TECHNIQUE/EXAM DESCRIPTION AND NUMBER OF VIEWS: Single portable view of the chest. COMPARISON: 4/17/2021 FINDINGS: Lines and tubes are stable. Cardiomediastinal silhouette is stable. Right chest tube is in place. The side-port is within the right chest wall just outside of the pleural cavity. Decreased right  pleural effusion with some increased air within the right base. Interstitial and ill-defined pulmonary opacities are not significantly changed from prior apart from some probable mildly improved right basilar atelectasis. No left pneumothorax. Bones are unchanged.     Decreased partially loculated right pleural fluid with increased pleural air. Right chest tube is in place. No other significant change.    DX-CHEST-PORTABLE (1 VIEW)    Result Date: 4/18/2021 4/17/2021 11:46 PM HISTORY/REASON FOR EXAM: Constant high PEEP alarms TECHNIQUE/EXAM DESCRIPTION:  Single AP view of the chest. COMPARISON: Yesterday FINDINGS: Right thoracostomy tube is been withdrawn somewhat with side-port terminating near the lateral thoracic wall.   Otherwise medical device position is stable. Cardiomegaly is observed. The mediastinal contour appears within normal limits.  The central  pulmonary vasculature appears prominent and indistinct. The lungs appear well expanded bilaterally.  Diffuse scattered hazy pulmonary parenchymal opacities are seen. Moderate loculated appearing right pleural effusion is seen. The bony structures appear age-appropriate.     1.  Pulmonary edema and/or infiltrates are identified, which are somewhat decreased since the prior exam. 2.  Moderate loculated appearing right pleural effusion, slightly decreased since prior    DX-CHEST-PORTABLE (1 VIEW)    Result Date: 4/17/2021 4/17/2021 9:54 AM HISTORY/REASON FOR EXAM:  Chest tube placement TECHNIQUE/EXAM DESCRIPTION AND NUMBER OF VIEWS: Single portable view of the chest. COMPARISON: Same day 2:48 AM FINDINGS: Right-sided chest tube is noted with tip in the right lower hemithorax. Other line and tube placements appear unchanged. Right pleural effusion is again identified. No pneumothorax is identified. Poorly defined pulmonary opacifications are again noted in each lung. No new infiltrates or consolidations are identified.     1.  New chest tube is noted in the  right lower hemithorax. 2.  Right pleural effusion is again identified which appears similar to the prior exam. 3.  No new infiltrates or consolidations are identified.    DX-CHEST-PORTABLE (1 VIEW)    Result Date: 4/17/2021 4/17/2021 2:20 AM HISTORY/REASON FOR EXAM: For indication of respiratory failure; For indication of respiratory failure TECHNIQUE/EXAM DESCRIPTION:  Single AP view of the chest. COMPARISON: Yesterday FINDINGS: Position of medical devices appears stable. Cardiomegaly is observed. The mediastinal contour appears within normal limits.  The central  pulmonary vasculature appears prominent and indistinct. The lungs appear well expanded bilaterally.  Diffuse scattered hazy pulmonary parenchymal opacities are seen. Moderate loculated appearing right pleural effusion is seen. The bony structures appear age-appropriate.     1.  Pulmonary edema and/or infiltrates are identified, which are stable since the prior exam. 2.  Moderate loculated appearing right pleural effusion    DX-CHEST-PORTABLE (1 VIEW)    Result Date: 4/16/2021 4/16/2021 6:44 PM HISTORY/REASON FOR EXAM:  POST INTUBATION TECHNIQUE/EXAM DESCRIPTION AND NUMBER OF VIEWS: Single portable view of the chest. COMPARISON: OSH 4/16/2021 FINDINGS: Endotracheal tube with tip projecting over the mid thoracic trachea. Gastric drainage tube courses below diaphragm, tip is not seen. Right central venous catheter with tip projecting over the expected area of the lower SVC. Right pigtail catheter is seen. Diffuse interstitial prominence. Hazy opacity throughout both lungs Moderate loculated right pleural effusion. No pneumothorax. Stable cardiopericardial silhouette.     No significant interval change.    DX-CHEST-PORTABLE (1 VIEW)    Result Date: 4/8/2021 4/8/2021 2:12 PM HISTORY/REASON FOR EXAM:  Sepsis. TECHNIQUE/EXAM DESCRIPTION AND NUMBER OF VIEWS: Single portable view of the chest. COMPARISON: 3/8/2020. FINDINGS: LUNGS: Mild interstitial edema.  No effusions. PNEUMOTHORAX: None. LINES AND TUBES: Leads project over the chest and visualized abdomen. Transvenous pacer lead projecting over the right atrium/ventricle. MEDIASTINUM: No cardiomegaly. MUSCULOSKELETAL STRUCTURES: No acute displaced fracture.     Mild interstitial edema. No focal consolidation.    IR-CHEST TUBE-EMPYEMA LEFT    Result Date: 4/23/2021 4/23/2021 5:30 PM HISTORY/REASON FOR EXAM:  Loculated small left pleural effusion. Chest tube placement requested. TECHNIQUE/EXAM DESCRIPTION: LEFT chest tube for empyema with ultrasound guidance. PROCEDURE: Informed consent was obtained. Procedure performed at bedside in the ICU. Pulse oximetry and continuous cardiac monitoring by the nurse was performed throughout the exam. Intraservice time was 45 minutes. SEDATION DURATION: N/A minutes. Patient was placed in right lateral decubitus position. A small inferior left pleural effusion with loculations and low level echoes is identified at real-time ultrasound. The spleen and left hemidiaphragm are well visualized. The skin was prepped with ChloraPrep and draped in a sterile fashion. Following local anesthesia with 1% Lidocaine, a 17 G guiding needle was placed from a LEFT posterior intercostal approach. Bloody pleural fluid was aspirated from the needle. An Amplatz guidewire was placed. Following tract dilatation, a 10 Ivorian locking loop pigtail catheter was placed and connected to Pleur-evac suction at 20 cm H2O. A specimen of 5 mL was submitted for culture and sensitivity, Gram stain, and cell count. Fluid character was bloody. Total of 8 mL fluid was initially evacuated and the catheter subsequently connected to Pleur-evac suction. A specimen was sent to the lab for culture and sensitivity, Gram stain, and cell count. The patient tolerated the procedure well. COMPARISON: CT of the chest 4/21/2021 FINDINGS: A small inferior multiloculated left pleural effusion is identified.     1.  CT GUIDED LEFT  10  English PIGTAIL CHEST TUBE PLACEMENT FOR POSSIBLE EMPYEMA YIELDING OLD BLOODY FLUID. 2. A CHEST RADIOGRAPH IS FORTHCOMING.    US-ABDOMEN LTD (SOFT TISSUE)    Result Date: 2021 9:25 AM HISTORY/REASON FOR EXAM:  Infected spinal stimulator device. TECHNIQUE/EXAM DESCRIPTION: Limited abdominal ultrasound. COMPARISON: None available. FINDINGS: Multiple images were obtained in the area of the spinal stimulator device within the left flank. No surrounding fluid collection.     No fluid seen surrounding the electronic implanted spinal stimulator device.    US-ABDOMEN LTD (SOFT TISSUE)    Result Date: 2021 8:07 PM HISTORY/REASON FOR EXAM:  posterior lumbar stimulator. R/o abscess TECHNIQUE/EXAM DESCRIPTION: Limited abdominal ultrasound. COMPARISON: None available. FINDINGS: Real-time grayscale and color Doppler sonography in the area of clinical concern around the lumbar spinal stimulator. There may be small focus of ill-defined fluid and there is some architectural distortion. There is no drainable fluid collection.     No drainable fluid collection.    US-EXTREMITY ARTERY LOWER UNILAT RIGHT    Result Date: 2021  Lower Extremity  Arterial Duplex Report  Vascular Laboratory  CONCLUSIONS  No hemodynamically significant arterial stenosis or occlusion.  BRIGID DAWSON  Exam Date:     2021 10:34  Room #:     Inpatient  Priority:     Stat  Ht (in):             Wt (lb):  Ordering Physician:        MARY JO CARRANZA                              ([id number])  Referring Physician:       TERE Piña  Sonographer:               Michelle Bhagat ROBERT  Study Type:                Complete Unilateral  Technical Quality:         Adequate  Age:    50    Gender:     F  MRN:    [id number]  :    1970      BSA:  Indications:     Absent Pulse, Encounter for screening for cardiovascular                   disorders  CPT Codes:       75729  ICD Codes:       785.9  Z13.6  History:         No pulse to Right lower  extremity; Cold foot; No previous                   exam on file.  Limitations:                RIGHT  Waveform        Peak Systolic Velocity (cm/s)                  Prox    Prox-Mid  Mid    Mid-Dist  Distal  Triphasic                         124                      CFA  Triphasic       77                                         PFA  Triphasic       87                102              97      SFA  Triphasic                         82                       POP  Triphasic       38                                 27      AT  Triphasic       89                                 68      PT  Biphasic        33                                 61      DRE                LEFT  Waveform        Peak Systolic Velocity (cm/s)                  Prox    Prox-Mid  Mid    Mid-Dist  Distal                                                             CFA                                                             PFA                                                             SFA                                                             POP                                                             AT                                                             PT                                                             DRE  FINDINGS  Right lower extremity-  Brisk and multiphasic waveforms visualized throughout the lower extremity  without evidence of stenosis or occlusion.  Evan Watson MD  (Electronically Signed)  Final Date:      02 May 2021 12:41    US-EXTREMITY VENOUS LOWER BILAT    Result Date: 4/27/2021   Vascular Laboratory  CONCLUSIONS  Normal bilateral superficial and deep venous examination of the lower  extremities.  DAWSON BRIGID  Exam Date:     04/27/2021 07:24  Room #:     Inpatient  Priority:     Stat  Ht (in):             Wt (lb):  Ordering Physician:        MARY JO CARRANZA                              ([id number])  Referring Physician:       TERE Piña  Sonographer:               Aura Reece RVT  Study Type:                 Complete Bilateral  Technical Quality:         Adequate  Age:    50    Gender:     F  MRN:    [id number]  :    1970      BSA:  Indications:     Encounter for screening for cardiovascular disorders  CPT Codes:       47194  ICD Codes:       Z13.6  History:         Concern for PE due to hospitalization and immobility. No                   prior duplex.  Limitations:  PROCEDURES:  Bilateral lower extremity venous duplex imaging.  The following venous structures were evaluated: common femoral, profunda  femoral, proximal greater saphenous, femoral, popliteal, peroneal and  posterior tibial veins.  Serial compression, augmentation maneuvers, color and spectral Doppler flow  evaluations were performed.  FINDINGS:  Bilateral lower extremities  No evidence of deep venous thrombosis.  Complete color filling and compressibility with normal venous flow dynamics  including spontaneous flow, response to augmentation maneuvers, and  respiratory phasicity.  Dedrick Alvarez MD  (Electronically Signed)  Final Date:      2021                   08:32    CT-CEREBRAL PERFUSION ANALYSIS    Result Date: 2021 1:42 PM HISTORY/REASON FOR EXAM: Neurological deficit. TECHNIQUE/EXAM DESCRIPTION AND NUMBER OF VIEWS: CT Cerebral Perfusion Analysis. The study was performed on a 128 slice G.E. Lightspeed Multidetector CT scanner. Perfusion data and corresponding time-activity curves are processed and displayed as color-coded maps in the axial plane for Cerebral Blood Flow (CBF), Cerebral Blood Volume  (CBV),T Max and Mean Transit Time (MTT) and are post processed on the Ischemia view-RAPID virtual . 50 mL of Omnipaque 350 nonionic contrast was injected intravenously. Low dose optimization technique was utilized for this CT exam including automated exposure control and adjustment of the mA and/or kV according to patient size. COMPARISON:  None. FINDINGS: Cerebral blood flow less than 30% = 0 mL. T Max  more than 6 seconds = 0 mL. Mismatch volume = 0 mL. Mismatch ratio = None.     1.  Cerebral blood flow less than 30% likely representing completed infarct = 0 mL. 2.  T Max more than 6 seconds likely representing combination of completed infarct and ischemia = 0 mL. 3.  Mismatched volume likely representing ischemic brain/penumbra = 0 mL. 4.  Please note that the cerebral perfusion was performed on the limited brain tissue around the basal ganglia region. Infarct/ischemia outside the CT perfusion sections can be missed in this study.    CT-CTA CHEST PULMONARY ARTERY W/ RECONS    Result Date: 4/27/2021 4/27/2021 11:52 AM HISTORY/REASON FOR EXAM:  PE suspected, high pretest prob Short of breath TECHNIQUE/EXAM DESCRIPTION: CT angiogram scan for pulmonary embolism with contrast, with reconstructions. 1.25 mm helical sections were obtained from the lung apices through the lung bases following the rapid bolus administration of 50 mL of Omnipaque 350 nonionic contrast. Thin-section overlapping reconstruction interval was utilized. Coronal reconstructions were generated from the axial data. MIP post processing was performed and utilized for the interpretation. Low dose optimization technique was utilized for this CT exam including automated exposure control and adjustment of the mA and/or kV according to patient size. COMPARISON: 4/21/2021, 4/25/2021 FINDINGS: Pulmonary Embolism: No. Main Pulmonary Arteries: No. Segmental branches: No. Subsegmental branches: No. Additional Comments: None. Lungs: Previous right chest tube were removed. There is increased layering fluid in the loculated right hydropneumothorax thorax. Unchanged collapsing of the right lung. Redemonstration of a pigtail catheter in the medial left lung base. Grossly similar multiple cavitary lesions in the left lung. ET tube with tip in the mid thoracic trachea. Trace left pleural effusion. Nodes: No enlarged mediastinal or hilar lymph nodes. Additional  findings: Thoracic aorta and great vessels:  No aneurysm. Heart and pericardium:   No significant coronary artery calcification. No pericardial effusion. Thoracic spine:  No fracture or malalignment. No compression deformity. Chest wall: Right anterior chest wall emphysema.. Visualized upper abdomen: No acute abnormality.     No CT evidence of pulmonary embolus. Previous right chest tube were removed. Grossly similar in size of the loculated right hydropneumothorax but there is increased layering fluid component. Unchanged mild shift of the mediastinal to the left. Redemonstration of a pigtail catheter in the medial left lung base. Grossly similar multiple cavitary lesions in the left lung.    EC-MICAH W/O CONT    Result Date: 2021  Transesophageal Echo Report Echocardiography Laboratory CONCLUSIONS Large vegetation seen on the anterior leaflet of the mitral valve. Mild aortic insufficiency. Large vegetation seen on the tricuspid valve leaflets. Mild tricuspid regurgitation. No thrombus detected in the left atrial appendage. BRIGID DAWSON Exam Date:          2021                     09:56 Exam Location:      Inpatient Priority:            Routine Ordering Physician:        GAMALIEL RAMIRES Referring Physician: Sonographer:               Ulises SWEENEY RDCS Age:    50     Gender:    F MRN:    [id number] :    1970 BSA:           Ht (in):           Wt (lb): Report Type:      Complete Indications:     Acute and subacute infective endocarditis ICD Codes:       I33.0 CPT Codes:       85931 BP:          /          HR: Technical Quality:       Good MEASUREMENTS  (Male / Female) Normal Values * Indicates values subject to auto-interpretation LV EF:        % Medications Limitations Complications Proc. Components The probe was inserted and manipulated by Dr. Eric Garvin. Probe #[id number]  was used for this procedure. 2D, color Doppler, spectral Doppler  imaging were used as part of the  evaluation as clinically indicated. FINDINGS Left Ventricle Right Ventricle Right Atrium Left Atrium LA Appendage No thrombus detected in the left atrial appendage. IA Septum IV Septum Mitral Valve Large vegetation seen on the anterior leaflet of the mitral valve. Mild aortic insufficiency. Aortic Valve Structurally normal aortic valve without significant stenosis or regurgitation. Tricuspid Valve Large vegetation seen on the tricuspid valve leaflets. Mild tricuspid regurgitation. Pulmonic Valve Structurally normal pulmonic valve without significant stenosis or regurgitation. Pericardium Aorta Lisa N To (Electronically Signed) Final Date:     23 April 2021                 14:53    DX-ABDOMEN FOR TUBE PLACEMENT    Result Date: 4/21/2021 4/21/2021 7:48 AM HISTORY/REASON FOR EXAM:  Cortrak evaluation. TECHNIQUE/EXAM DESCRIPTION AND NUMBER OF VIEWS:  1 view(s) of the abdomen. COMPARISON:  4/17/2021. FINDINGS: Cortrak feeding tube tip projects in the region of the duodenal bulb. The thoracic course appears appropriate. The bowel gas pattern is within normal limits.     Cortrak feeding tube tip projects in the region of the duodenal bulb.    DX-ABDOMEN FOR TUBE PLACEMENT    Result Date: 4/17/2021 4/17/2021 12:13 PM HISTORY/REASON FOR EXAM:  Tube placement TECHNIQUE/EXAM DESCRIPTION AND NUMBER OF VIEWS:  1 view(s) of the abdomen. COMPARISON:  4/16/2021 FINDINGS: Enteric tube has been placed. The tip projects over the distal stomach. The bowel gas pattern is within normal limits. Stimulator device projects over the LEFT abdomen. RIGHT-sided  shunt present. RIGHT-sided central venous catheter in similar position.     Feeding tube tip at the distal stomach.    IR-PICC LINE PLACEMENT W/ GUIDANCE > AGE 7    Result Date: 5/1/2021  HISTORY/REASON FOR EXAM:   PICC placement. TECHNIQUE/EXAM DESCRIPTION AND NUMBER OF VIEWS:   PICC line insertion with ultrasound guidance.  The procedure was performed using maximal  sterile barrier technique including sterile gown, mask, cap, and donning of sterile gloves following appropriate hand hygiene and/or sterile scrub. Patient skin site was prepped with 2% Chlorhexidine solution. FINDINGS:  PICC line insertion with Ultrasound Guidance was performed by qualified nursing staff without the assistance of a Radiologist. PICC positioning appropriateness confirmed by 3CG technology; chest xray only needed in the instance 3CG unable to confirm placement.              Ultrasound-guided PICC placement performed by qualified nursing staff as above.     POCT BUTTERFLY-DUPLEX SCAN EXTREMITY VEINS LIMITED    Result Date: 4/27/2021  Study Date and Time: 2021-04-27 06:48 Study Author: Nereida Sun Lower Extremity Venous (Pending sale to [facility]): Indication(s) for exam: Indication: Hypoxemia Other indication: Image quality: Image quality: Limited. Some interpretable images Findings: Right Saphenofemoral Junction: Compressible Right Common Femoral Vein: Compressible Right Femoral Vein: Compressible Right Popliteal Vein: Compressible Right Popliteal Trifurcation: Compressible Left Saphenofemoral Junction: Compressible Left Common Femoral Vein: Compressible Left Femoral Vein: Compressible Left Popliteal Vein: Compressible Left Popliteal Trifurcation: Compressible Other: Interpretation: Select all that apply: No sonographic evidence of deep vein thrombosis If present, location(s) of DVT: Other: Signed by Nereida Sun on 2021-04-27 07:05       Micro:  Results     Procedure Component Value Units Date/Time    CULTURE TISSUE W/ GRM STAIN [[id number]]  (Abnormal)  (Susceptibility) Collected: 04/28/21 1711    Order Status: Completed Specimen: Tissue Updated: 05/01/21 1241     Significant Indicator POS     Source TISS     Site Right Pleural Debris     Culture Result -     Gram Stain Result Rare WBCs.  No organisms seen.       Culture Result Klebsiella pneumoniae  Light growth      Narrative:      Surgery Specimen     "Susceptibility     Klebsiella pneumoniae (1)     Antibiotic Interpretation Microscan Method Status    Ceftriaxone Sensitive <=1 mcg/mL AUGUSTINA Final    Cefazolin Sensitive <=2 mcg/mL AUGUSTINA Final    Ciprofloxacin Sensitive <=0.25 mcg/mL AUGUSTINA Final    Cefepime Sensitive <=2 mcg/mL AUGUSTINA Final    Cefuroxime Sensitive <=4 mcg/mL AUGUSTINA Final    Ampicillin/sulbactam Sensitive 8/4 mcg/mL AUGUSTINA Final    Ertapenem Sensitive <=0.5 mcg/mL AUGUSTINA Final    Tobramycin Sensitive <=2 mcg/mL AUGUSTINA Final    Gentamicin Sensitive <=2 mcg/mL AUGUSTINA Final    Moxifloxacin Sensitive <=2 mcg/mL AUGUSTINA Final    Pip/Tazobactam Sensitive <=8 mcg/mL AUGUSTINA Final    Trimeth/Sulfa Resistant >2/38 mcg/mL AUUGSTINA Final    Tigecycline Sensitive <=2 mcg/mL AUGUSTINA Final                   Anaerobic Culture [[id number]] Collected: 04/28/21 1711    Order Status: Completed Specimen: Tissue Updated: 05/01/21 1241     Significant Indicator NEG     Source TISS     Site Right Pleural Debris     Culture Result No Anaerobes isolated.    Narrative:      Surgery Specimen    BLOOD CULTURE [[id number]] Collected: 04/25/21 1248    Order Status: Completed Specimen: Blood from Peripheral Updated: 04/30/21 1500     Significant Indicator NEG     Source BLD     Site PERIPHERAL     Culture Result No growth after 5 days of incubation.    Narrative:      Collected By:[id number] JAELYN TORRES  Per Hospital Policy: Only change Specimen Src: to \"Line\" if  specified by physician order.  Right Forearm/Arm    BLOOD CULTURE [[id number]] Collected: 04/25/21 1248    Order Status: Completed Specimen: Blood from Peripheral Updated: 04/30/21 1500     Significant Indicator NEG     Source BLD     Site PERIPHERAL     Culture Result No growth after 5 days of incubation.    Narrative:      Collected By:[id number] JAELYN TORRES  Per Hospital Policy: Only change Specimen Src: to \"Line\" if  specified by physician order.  Left Forearm/Arm    GRAM STAIN [[id number]] Collected: 04/28/21 1711    Order Status: Completed " "Specimen: Tissue Updated: 04/29/21 0011     Significant Indicator .     Source TISS     Site Right Pleural Debris     Gram Stain Result Rare WBCs.  No organisms seen.      Narrative:      Surgery Specimen    SARS-CoV-2 PCR (24 hour In-House): Collect NP swab in [facility] [[id number]] Collected: 04/27/21 2215    Order Status: Completed Specimen: Respirate from Nasal Updated: 04/28/21 1033     SARS-CoV-2 Source NP Swab     SARS-CoV-2 by PCR NotDetected     Comment: PATIENTS: Important information regarding your results and instructions can  be found at [email or web address]   \"After your  Covid-19 Test\"  [facility] providers: PLEASE REFER TO DE-ESCALATION AND RETESTING PROTOCOL  on [email or web address]  **The [email or web address] COVID-19 SARS-CoV-2 RT-PCR test has been made available for  use under the Emergency Use Authorization (EUA) only.         Narrative:      Collected By:[id number] ANNE SANCHEZ  Have you been in close contact with a person who is suspected  or known to be positive for COVID-19 within the last 30 days  (e.g. last seen that person < 30 days ago)->Unknown    HIV Rapid Screen (Exposure) [[id number]] Collected: 04/26/21 1430    Order Status: Completed Specimen: Blood Updated: 04/26/21 1522     HIV Ag/Ab Combo Assay Non-Reactive     Comment: Roche HIV is a diagnostic test for the qualitative determination of HIV-1  p24 antigen and antibodies to HIV-1 (HIV-1 groups M and O) and HIV-2 in  human serum and plasma. A negative screen does not preclude the possibility  of exposure or infection. Consider retesting in high-risk patients, if  clinically indicated.         Narrative:      Enter exposed person's MRN only (do not enter employee's  name):->[id number]  Exposed person's employer?->[employer]    Acid Fast Stain [[id number]] Collected: 04/24/21 0115    Order Status: Completed Specimen: Respirate Updated: 04/26/21 1253     Significant Indicator NEG     Source RESP     Site BRONCHOALVEOLAR LAVAGE     AFB Smear " Results No acid fast bacilli seen.    Narrative:      Collected By:[id number] QUANG HARRELL.  Which Lobe (Bronch Only):->LLL  Collected By:[id number] QUANG HARRELL.  BAL from RLL  Collected By:[id number] QUANG HARRELL.    Quant Bronchial Washing [[id number]]  (Abnormal)  (Susceptibility) Collected: 04/24/21 0115    Order Status: Completed Specimen: Respirate from Bronchoalveolar Lavage Updated: 04/26/21 1252     Significant Indicator POS     Source RESP     Site BRONCHOALVEOLAR LAVAGE     Culture Result 500 cfu/mL usual upper respiratory sari  No clinically significant Staphylococcus aureus, Methicillin  Resistant Staphylococcus aureus, or Pseudomonas species  isolated.       Gram Stain Result Moderate WBCs.  Rare Gram negative rods.       Culture Result Klebsiella pneumoniae  >100,000 cfu/mL      Narrative:      Collected By:[id number] QUANG HARRELL.  Which Lobe (Bronch Only):->LLL  Collected By:[id number] QUANG HARRELL.  BAL from RLL  Collected By:[id number] QUANG HARRELL.    Susceptibility     Klebsiella pneumoniae (1)     Antibiotic Interpretation Microscan Method Status    Ampicillin Resistant >16 mcg/mL AUGUSTINA Final    Ceftriaxone Sensitive <=1 mcg/mL AUGUSTINA Final    Ceftazidime Sensitive <=1 mcg/mL AUGUSTINA Final    Cefazolin Sensitive <=2 mcg/mL AUGUSTINA Final    Ciprofloxacin Sensitive <=0.25 mcg/mL AUGUSTINA Final    Cefepime Sensitive <=2 mcg/mL AUGUSTINA Final    Ampicillin/sulbactam Sensitive <=4/2 mcg/mL AUGUSTINA Final    Cefuroxime Sensitive <=4 mcg/mL AUGUSTINA Final    Tobramycin Sensitive <=2 mcg/mL AUGUSTINA Final    Ertapenem Sensitive <=0.5 mcg/mL AUGUSTINA Final    Gentamicin Sensitive <=2 mcg/mL AUGUSTINA Final    Moxifloxacin Sensitive <=2 mcg/mL AUGUSTINA Final    Pip/Tazobactam Sensitive <=8 mcg/mL AUGUSTINA Final    Trimeth/Sulfa Resistant >2/38 mcg/mL AUGUSTINA Final    Tigecycline Sensitive <=2 mcg/mL AUGUSTINA Final                   GRAM STAIN [[id number]] Collected: 04/24/21 0115    Order Status: Completed Specimen: Respirate Updated: 04/26/21 1252     Significant  Indicator .     Source RESP     Site BRONCHOALVEOLAR LAVAGE     Gram Stain Result Moderate WBCs.  Rare Gram negative rods.      Narrative:      Collected By:[id number] QUANG VALERIO  Which Lobe (Bronch Only):->LLL  Collected By:[id number] QUANG HARRELL.  BAL from RLL  Collected By:[id number] QUANG VALERIO    FLUID CULTURE W/GRAM STAIN [[id number]] Collected: 04/23/21 1737    Order Status: Completed Specimen: Body Fluid from Pleural Fluid Updated: 04/26/21 0722     Significant Indicator NEG     Source BF     Site PLEURAL FLUID     Culture Result No growth at 72 hours.     Gram Stain Result Many WBCs.  No organisms seen.      Narrative:      Collected By:[id number] LAURA SANCHEZ  8cc left pleural fluid collected by Dr. Ulloa; Results to Dr. Burgos  Collected By:[id number] LAURA SANCHEZ    URINALYSIS [[id number]]  (Abnormal) Collected: 04/25/21 1946    Order Status: Completed Specimen: Urine, Lopez Cath Updated: 04/25/21 2125     Color Yellow     Character Clear     Specific Gravity 1.020     Ph 6.5     Glucose Negative mg/dL      Ketones Trace mg/dL      Protein 30 mg/dL      Bilirubin Small     Urobilinogen, Urine 2.0     Nitrite Negative     Leukocyte Esterase Trace     Occult Blood Moderate     Micro Urine Req Microscopic    Narrative:      Collected By:[id number] POLO THOMAS          Assessment:  Active Hospital Problems    Diagnosis    • *Acute bacterial endocarditis [I33.0]    • Agitation requiring sedation protocol [R45.1]    • 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]    • Acute respiratory failure with hypoxia (HCC) [J96.01]    • Empyema of right pleural space (HCC) [J86.9]    • Acute encephalopathy [G93.40]    • Fever [R50.9]    • Pleural effusion, left [J90]    • Atelectasis [J98.11]    • CSF pleocytosis [D72.9]    • Pneumonia [J18.9]    • Tachycardia [R00.0]    • Pseudotumor cerebri [G93.2]    • S/P  shunt [Z98.2]    • Prolonged Q-T interval on ECG [R94.31]    • History of vasculitis [Z86.79]    • History of complicated migraines  [G43.109]    • Hyponatremia [E87.1]    • H/O: CVA (cerebrovascular accident) [Z86.73]    • Chronic pain syndrome [G89.4]    • Thrombocytopenia (HCC) [D69.6]    • Spinal cord stimulator status [Z96.89]    • Goals of care, counseling/discussion [Z71.89]    • Abnormal movement [G25.9]    • Pulmonary abscess (HCC) [J85.2]    • Anasarca [R60.1]    • Thrombocytosis (HCC) [D47.3]    • Bacteremia due to methicillin susceptible Staphylococcus aureus (MSSA) [R78.81, B95.61]    • GERD (gastroesophageal reflux disease) [K21.9]    • Septic shock (HCC) [A41.9, R65.21]    • Anemia [D64.9]    • Hypokalemia [E87.6]      Impression   -Severe sepsis, now septic shock  -Catheter related bloodstream infection due to infected port status post removal 4/12-staph aureus  -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       - Acute left empyema s/p chest tube placement 4/23  -Multiple acute septic pulmonary emboli bilaterally  -Acute bilateral pneumonia due to above     --Pulmonary edema  -Pseudotumor cerebri with underlying  shunt: possible arachnoiditis  -Chronic migraine headaches  -History of autoimmune vasculitis  -Elevated alkaline phosphatase & bilirubin  -Acute encephalopathy due to sepsis      - anemia due to above      - New secondary Klebsiella pneumoniae pneumonia (presumed VAP)     Plan   Patient has evidence of disseminated staphylococcal sepsis source from her line with infection to her lungs, tricuspid & mitral valves and  bilat empyema requiring transfer to [facility] for decortication due to lack of CT surgery support at [facility].  Patient with bilateral cavitary lung abscess and empyema.   - Persistent fever likely attributable to empyema and multiple lung abscesses. Currently covered.  - Continue cefepime  - US of spinal stimulator shows ill defined fluid collection, but undrainable. NSGY does not recommend  stiumulator removal due to instability      - Consider MRI of brain when clinically feasible. Spinal stimulator may prevent MRI.   - Central line exchanged 4/22  - Initial CSF cultures has pleocytosis, but no culture growth. CSF findings on 4/13:  63 wbc, 82% PMN, protein 97  - Chest tube upsized by surgery 4/17.   - Blood culture from 4/14 at [facility] positive. Admission blood cultures here on 4/16 both positive.  4/17 blood cultures NGTD  - original infected port removed 4/12  - vent management per pulmonology  - Tolerated the decortication. Cx NGTD.   - Had bloody mucous plugs removed by bronch  - Now off pressors   - No IV abx changes or further ID workup in the moment  - Discussion for trach I hear is pending decision.         No air leak today she tolerated 2  hrs of spontaneous breathing with support today.The K. pneumoniae from her thoracoscopy is sensitive to her cefepime so no antibiotic change needed. Case and treatment reviewed with , micro and RN 35 min on floor in ICU with > 50% face to face view and 100% in direct patient care/counseling/consulting today.

## 2021-05-02 NOTE — CARE PLAN
Problem: Communication  Goal: The ability to communicate needs accurately and effectively will improve  Outcome: PROGRESSING SLOWER THAN EXPECTED     Problem: Safety  Goal: Will remain free from injury  Outcome: PROGRESSING AS EXPECTED  Goal: Will remain free from falls  Outcome: PROGRESSING AS EXPECTED     Problem: Infection  Goal: Will remain free from infection  Outcome: PROGRESSING SLOWER THAN EXPECTED     Problem: Venous Thromboembolism (VTW)/Deep Vein Thrombosis (DVT) Prevention:  Goal: Patient will participate in Venous Thrombosis (VTE)/Deep Vein Thrombosis (DVT)Prevention Measures  Outcome: PROGRESSING AS EXPECTED     Problem: Safety - Medical Restraint  Goal: Remains free of injury from restraints (Restraint for Interference with Medical Device)  Description: INTERVENTIONS:  1. Determine that other, less restrictive measures have been tried or would not be effective before applying the restraint  2. Evaluate the patient's condition at the time of restraint application  3. Inform patient/family regarding the reason for restraint  4. Q2H: Monitor safety, psychosocial status, comfort, nutrition and hydration  Outcome: PROGRESSING AS EXPECTED  Goal: Free from restraint(s) (Restraint for Interference with Medical Device)  Description: INTERVENTIONS:  1. ONCE/SHIFT or MINIMUM Q12H: Assess and document the continuing need for restraints  2. Q24H: Continued use of restraint requires LIP to perform face to face examination and written order  3. Identify and implement measures to help patient regain control  Outcome: PROGRESSING SLOWER THAN EXPECTED     Problem: Respiratory:  Goal: Respiratory status will improve  Outcome: PROGRESSING SLOWER THAN EXPECTED     Problem: Pain Management  Goal: Pain level will decrease to patient's comfort goal  Outcome: PROGRESSING SLOWER THAN EXPECTED     Problem: Psychosocial Needs:  Goal: Level of anxiety will decrease  Outcome: PROGRESSING SLOWER THAN EXPECTED

## 2021-05-02 NOTE — PROGRESS NOTES
During assessment noted RLE cold, pedal pulse absent, unable to doppler. Post tib present with doppler.   Notified Dr. Sun

## 2021-05-03 ENCOUNTER — APPOINTMENT (OUTPATIENT)
Dept: RADIOLOGY | Facility: MEDICAL CENTER | Age: 51
DRG: 003 | End: 2021-05-03
Attending: INTERNAL MEDICINE
Payer: MEDICARE

## 2021-05-03 LAB
ALBUMIN SERPL BCP-MCNC: 2 G/DL (ref 3.2–4.9)
ALBUMIN/GLOB SERPL: 0.4 G/DL
ALP SERPL-CCNC: 374 U/L (ref 30–99)
ALT SERPL-CCNC: 30 U/L (ref 2–50)
ANION GAP SERPL CALC-SCNC: 5 MMOL/L (ref 7–16)
AST SERPL-CCNC: 29 U/L (ref 12–45)
BASOPHILS # BLD AUTO: 0.7 % (ref 0–1.8)
BASOPHILS # BLD: 0.12 K/UL (ref 0–0.12)
BILIRUB SERPL-MCNC: 0.7 MG/DL (ref 0.1–1.5)
BUN SERPL-MCNC: 15 MG/DL (ref 8–22)
CALCIUM SERPL-MCNC: 8.1 MG/DL (ref 8.5–10.5)
CHLORIDE SERPL-SCNC: 101 MMOL/L (ref 96–112)
CO2 SERPL-SCNC: 22 MMOL/L (ref 20–33)
CREAT SERPL-MCNC: <0.17 MG/DL (ref 0.5–1.4)
CRP SERPL HS-MCNC: 12.74 MG/DL (ref 0–0.75)
EOSINOPHIL # BLD AUTO: 0 K/UL (ref 0–0.51)
EOSINOPHIL NFR BLD: 0 % (ref 0–6.9)
ERYTHROCYTE [DISTWIDTH] IN BLOOD BY AUTOMATED COUNT: 56.7 FL (ref 35.9–50)
GLOBULIN SER CALC-MCNC: 4.5 G/DL (ref 1.9–3.5)
GLUCOSE SERPL-MCNC: 93 MG/DL (ref 65–99)
HCT VFR BLD AUTO: 24.2 % (ref 37–47)
HGB BLD-MCNC: 7.8 G/DL (ref 12–16)
IMM GRANULOCYTES # BLD AUTO: 0.72 K/UL (ref 0–0.11)
IMM GRANULOCYTES NFR BLD AUTO: 4 % (ref 0–0.9)
LYMPHOCYTES # BLD AUTO: 1.44 K/UL (ref 1–4.8)
LYMPHOCYTES NFR BLD: 8 % (ref 22–41)
MAGNESIUM SERPL-MCNC: 1.6 MG/DL (ref 1.5–2.5)
MCH RBC QN AUTO: 30.4 PG (ref 27–33)
MCHC RBC AUTO-ENTMCNC: 32.2 G/DL (ref 33.6–35)
MCV RBC AUTO: 94.2 FL (ref 81.4–97.8)
MONOCYTES # BLD AUTO: 1.5 K/UL (ref 0–0.85)
MONOCYTES NFR BLD AUTO: 8.4 % (ref 0–13.4)
NEUTROPHILS # BLD AUTO: 14.18 K/UL (ref 2–7.15)
NEUTROPHILS NFR BLD: 78.9 % (ref 44–72)
NRBC # BLD AUTO: 0.05 K/UL
NRBC BLD-RTO: 0.3 /100 WBC
PHOSPHATE SERPL-MCNC: 2.8 MG/DL (ref 2.5–4.5)
PLATELET # BLD AUTO: 383 K/UL (ref 164–446)
PMV BLD AUTO: 8.9 FL (ref 9–12.9)
POTASSIUM SERPL-SCNC: 3.5 MMOL/L (ref 3.6–5.5)
PREALB SERPL-MCNC: 16.9 MG/DL (ref 18–38)
PROT SERPL-MCNC: 6.5 G/DL (ref 6–8.2)
RBC # BLD AUTO: 2.57 M/UL (ref 4.2–5.4)
SODIUM SERPL-SCNC: 128 MMOL/L (ref 135–145)
WBC # BLD AUTO: 18 K/UL (ref 4.8–10.8)

## 2021-05-03 PROCEDURE — 84134 ASSAY OF PREALBUMIN: CPT

## 2021-05-03 PROCEDURE — 94799 UNLISTED PULMONARY SVC/PX: CPT

## 2021-05-03 PROCEDURE — 700105 HCHG RX REV CODE 258: Performed by: INTERNAL MEDICINE

## 2021-05-03 PROCEDURE — 84100 ASSAY OF PHOSPHORUS: CPT

## 2021-05-03 PROCEDURE — A9270 NON-COVERED ITEM OR SERVICE: HCPCS | Performed by: INTERNAL MEDICINE

## 2021-05-03 PROCEDURE — 99292 CRITICAL CARE ADDL 30 MIN: CPT | Performed by: INTERNAL MEDICINE

## 2021-05-03 PROCEDURE — 770022 HCHG ROOM/CARE - ICU (200)

## 2021-05-03 PROCEDURE — 700111 HCHG RX REV CODE 636 W/ 250 OVERRIDE (IP): Performed by: INTERNAL MEDICINE

## 2021-05-03 PROCEDURE — 71045 X-RAY EXAM CHEST 1 VIEW: CPT

## 2021-05-03 PROCEDURE — 700102 HCHG RX REV CODE 250 W/ 637 OVERRIDE(OP): Performed by: INTERNAL MEDICINE

## 2021-05-03 PROCEDURE — 85025 COMPLETE CBC W/AUTO DIFF WBC: CPT

## 2021-05-03 PROCEDURE — 94003 VENT MGMT INPAT SUBQ DAY: CPT

## 2021-05-03 PROCEDURE — 700101 HCHG RX REV CODE 250: Performed by: INTERNAL MEDICINE

## 2021-05-03 PROCEDURE — 99291 CRITICAL CARE FIRST HOUR: CPT | Performed by: INTERNAL MEDICINE

## 2021-05-03 PROCEDURE — 86140 C-REACTIVE PROTEIN: CPT

## 2021-05-03 PROCEDURE — 80053 COMPREHEN METABOLIC PANEL: CPT

## 2021-05-03 PROCEDURE — 83735 ASSAY OF MAGNESIUM: CPT

## 2021-05-03 RX ORDER — KETAMINE HYDROCHLORIDE 50 MG/ML
INJECTION, SOLUTION INTRAMUSCULAR; INTRAVENOUS
Status: DISCONTINUED
Start: 2021-05-03 | End: 2021-05-03

## 2021-05-03 RX ORDER — MAGNESIUM SULFATE HEPTAHYDRATE 40 MG/ML
2 INJECTION, SOLUTION INTRAVENOUS ONCE
Status: COMPLETED | OUTPATIENT
Start: 2021-05-03 | End: 2021-05-03

## 2021-05-03 RX ADMIN — OXYCODONE HYDROCHLORIDE 15 MG: 10 TABLET ORAL at 21:32

## 2021-05-03 RX ADMIN — MIDAZOLAM HYDROCHLORIDE 2 MG: 1 INJECTION, SOLUTION INTRAMUSCULAR; INTRAVENOUS at 06:44

## 2021-05-03 RX ADMIN — PAROXETINE HYDROCHLORIDE 10 MG: 20 TABLET, FILM COATED ORAL at 05:30

## 2021-05-03 RX ADMIN — CARVEDILOL 6.25 MG: 6.25 TABLET, FILM COATED ORAL at 06:44

## 2021-05-03 RX ADMIN — MAGNESIUM SULFATE IN WATER 2 G: 40 INJECTION, SOLUTION INTRAVENOUS at 08:02

## 2021-05-03 RX ADMIN — DIVALPROEX SODIUM 500 MG: 125 CAPSULE ORAL at 21:33

## 2021-05-03 RX ADMIN — OXYCODONE HYDROCHLORIDE 15 MG: 10 TABLET ORAL at 10:12

## 2021-05-03 RX ADMIN — FUROSEMIDE 20 MG: 10 INJECTION, SOLUTION INTRAMUSCULAR; INTRAVENOUS at 05:30

## 2021-05-03 RX ADMIN — HYDROMORPHONE HYDROCHLORIDE 2 MG: 1 INJECTION, SOLUTION INTRAMUSCULAR; INTRAVENOUS; SUBCUTANEOUS at 00:00

## 2021-05-03 RX ADMIN — ACETAMINOPHEN 650 MG: 325 TABLET, FILM COATED ORAL at 23:57

## 2021-05-03 RX ADMIN — DIVALPROEX SODIUM 500 MG: 125 CAPSULE ORAL at 13:29

## 2021-05-03 RX ADMIN — ACETAMINOPHEN 650 MG: 325 TABLET, FILM COATED ORAL at 18:12

## 2021-05-03 RX ADMIN — OXYCODONE HYDROCHLORIDE 15 MG: 100 SOLUTION ORAL at 06:43

## 2021-05-03 RX ADMIN — POTASSIUM BICARBONATE 50 MEQ: 978 TABLET, EFFERVESCENT ORAL at 08:02

## 2021-05-03 RX ADMIN — CARVEDILOL 6.25 MG: 6.25 TABLET, FILM COATED ORAL at 18:08

## 2021-05-03 RX ADMIN — ACETAMINOPHEN 650 MG: 325 TABLET, FILM COATED ORAL at 13:29

## 2021-05-03 RX ADMIN — ACETAMINOPHEN 650 MG: 325 TABLET, FILM COATED ORAL at 06:43

## 2021-05-03 RX ADMIN — HYDROMORPHONE HYDROCHLORIDE 1 MG: 1 INJECTION, SOLUTION INTRAMUSCULAR; INTRAVENOUS; SUBCUTANEOUS at 17:23

## 2021-05-03 RX ADMIN — FUROSEMIDE 20 MG: 10 INJECTION, SOLUTION INTRAMUSCULAR; INTRAVENOUS at 18:12

## 2021-05-03 RX ADMIN — MIDAZOLAM HYDROCHLORIDE 2 MG: 1 INJECTION, SOLUTION INTRAMUSCULAR; INTRAVENOUS at 20:45

## 2021-05-03 RX ADMIN — RISPERIDONE 2 MG: 1 TABLET, FILM COATED ORAL at 18:08

## 2021-05-03 RX ADMIN — FAMOTIDINE 20 MG: 20 TABLET ORAL at 18:08

## 2021-05-03 RX ADMIN — DIVALPROEX SODIUM 500 MG: 125 CAPSULE ORAL at 05:29

## 2021-05-03 RX ADMIN — KETAMINE HYDROCHLORIDE 0.5 MG/KG/HR: 50 INJECTION INTRAMUSCULAR; INTRAVENOUS at 21:53

## 2021-05-03 RX ADMIN — POTASSIUM CHLORIDE 40 MEQ: 1500 TABLET, EXTENDED RELEASE ORAL at 05:30

## 2021-05-03 RX ADMIN — DOCUSATE SODIUM 50 MG AND SENNOSIDES 8.6 MG 2 TABLET: 8.6; 5 TABLET, FILM COATED ORAL at 05:30

## 2021-05-03 RX ADMIN — FAMOTIDINE 20 MG: 10 INJECTION INTRAVENOUS at 05:29

## 2021-05-03 RX ADMIN — DULOXETINE HYDROCHLORIDE 60 MG: 60 CAPSULE, DELAYED RELEASE ORAL at 18:08

## 2021-05-03 RX ADMIN — RISPERIDONE 2 MG: 1 TABLET, FILM COATED ORAL at 05:30

## 2021-05-03 RX ADMIN — HYDROMORPHONE HYDROCHLORIDE 1 MG: 1 INJECTION, SOLUTION INTRAMUSCULAR; INTRAVENOUS; SUBCUTANEOUS at 06:29

## 2021-05-03 RX ADMIN — CEFEPIME 1 G: 1 INJECTION, POWDER, FOR SOLUTION INTRAMUSCULAR; INTRAVENOUS at 13:31

## 2021-05-03 RX ADMIN — ENOXAPARIN SODIUM 40 MG: 40 INJECTION SUBCUTANEOUS at 05:29

## 2021-05-03 RX ADMIN — CEFEPIME 1 G: 1 INJECTION, POWDER, FOR SOLUTION INTRAMUSCULAR; INTRAVENOUS at 05:31

## 2021-05-03 RX ADMIN — OXYCODONE HYDROCHLORIDE 15 MG: 100 SOLUTION ORAL at 02:56

## 2021-05-03 RX ADMIN — DULOXETINE HYDROCHLORIDE 60 MG: 60 CAPSULE, DELAYED RELEASE ORAL at 05:30

## 2021-05-03 RX ADMIN — HYDROMORPHONE HYDROCHLORIDE 1 MG: 1 INJECTION, SOLUTION INTRAMUSCULAR; INTRAVENOUS; SUBCUTANEOUS at 20:30

## 2021-05-03 RX ADMIN — OXYCODONE HYDROCHLORIDE 15 MG: 10 TABLET ORAL at 14:12

## 2021-05-03 RX ADMIN — MIDAZOLAM 6 MG/HR: 5 INJECTION INTRAMUSCULAR; INTRAVENOUS at 10:11

## 2021-05-03 RX ADMIN — CEFEPIME 2 G: 2 INJECTION, POWDER, FOR SOLUTION INTRAVENOUS at 18:09

## 2021-05-03 RX ADMIN — OXYCODONE HYDROCHLORIDE 15 MG: 10 TABLET ORAL at 18:08

## 2021-05-03 RX ADMIN — LABETALOL HYDROCHLORIDE 10 MG: 5 INJECTION, SOLUTION INTRAVENOUS at 20:45

## 2021-05-03 ASSESSMENT — PAIN DESCRIPTION - PAIN TYPE
TYPE: ACUTE PAIN

## 2021-05-03 NOTE — RESPIRATORY CARE
Vent day #17    Tracheostomy day #2    Successful weaning trials x 2.     Current vent settings:RR 24  PEEP 8 fiO2 30%    Plan: Rest overnight and evaluate for ventilator liberation tomorrow.

## 2021-05-03 NOTE — CARE PLAN
Problem: Ventilation Defect:  Goal: Ability to achieve and maintain unassisted ventilation or tolerate decreased levels of ventilator support  Intervention: Support and monitor invasive and noninvasive mechanical ventilation  Note: Vent Day #18    RR 24  Vt 300  PEEP 8  FiO2 30%

## 2021-05-03 NOTE — CARE PLAN
Problem: Pain Management  Goal: Pain level will decrease to patient's comfort goal  Outcome: PROGRESSING AS EXPECTED  Note: Pt grimaces to any stimulation. Scheduled pain medications started. CPOT remains low     Problem: Safety - Medical Restraint  Goal: Remains free of injury from restraints (Restraint for Interference with Medical Device)  Description: INTERVENTIONS:  1. Determine that other, less restrictive measures have been tried or would not be effective before applying the restraint  2. Evaluate the patient's condition at the time of restraint application  3. Inform patient/family regarding the reason for restraint  4. Q2H: Monitor safety, psychosocial status, comfort, nutrition and hydration  Outcome: MET  Note: Soft wrist restraints in use, no signs of injury noted.

## 2021-05-03 NOTE — PROGRESS NOTES
Infectious Disease Progress Note    Author: Brian Omer M.D. Date & Time created: 5/3/2021  11:47 AM     Cefepime 4/23 -  P/O Thoracoscopy & Decortication Day #4     PRIOR Rx:   Zosyn 4/22-4/23 S/P Thoracoscopy & Decortication Day #4  Previous: Unasyn, Zosyn, Vanco, Daptomycin  Ceftaroline: start 4/13-4/15  Nafcillin 2g Q4 hrs 4/15-4/23 4/14: Unable to give name of previous NSG or neurologist. Seems confused, but able to say some sentences fluently.   4/15: Line cultures now growing MSSA. As well as from the blood. Repeat blood culture 4/13+ in both sets. Patient has worsening confusion. CSF fluid analyses suggest pleocytosis. Cultures are no growth from the CSF. Chest CT was ordered this morning indicating a loculated right pleural effusion as well as bilateral septic emboli. Dr. Mack spoke with Dr. Oh yesterday and no surgical intervention unless clinical deterioration.  4/16: Increased respiratory distress.  Tachypneic.  CT with loculated collection.  Dr Resendiz not available.  No thoracic surgeon available.  Plans to have pulmonary place CT.  Transferring to ICU.  4/17: Transferred to [facility] last night. Hgb dropped to 6.4 requiring PRBC transfusion. Requiring 2 pressors. Fio2 50%. Febrile 38.8. Pending OR decortication of empyema and hemothorax. Chest tube placed at [facility] shows 8,371 WBC, ,000, 74% N  4/18: Chest tube was upsized by surgery yesterday, no decortication. WBC 22k. Fio2 40%. Vasopressin. Levophed down to 2mcg. Afebrile 24 hrs. Last fever 38.6 on 4/16.   4/19: Still on vent. Levo 3 mcg, vasopressin. Afebrile 72 hours. WBC 21k. BC 4/17 NGTD x 48 hours. Unable to do MRI brain given neurostimulator per RN.   4/20: Remaining afebrile. Hb 6.2 and undergoing transfusion today.WBC 24,100, 5% bands.1700 ml CT output. Copious bloody ET secretions. No pressors. Receiving dornase and TPA per CT. Right pleural effusion not large per CXR today.   4/21: WBC 31k. Bronchoscopy yesterday showing  blood. Cultures sent. TPA on hold per RN due to arvind blood from chest tube requiring PRBC transfusion for hgb 6. Pending chest CT today. Per , spinal stimulator is non-MRI compatible. Levophed 10mcg. 30% Fio2. Afebrile.   4/22: Fever 101.3 this am. WBC 20,300 down from 32,200 yesterday. BAL growing Klebsiella pneumoniae but susceptibility panel not set up until today. CTA of brain shows no lesion. CT of chest shows enlarging cavitary lung lesions bilat and large loculated new left pleural effusion and large hydropneumothorax on right. TPA & dornase infusions of right chest ended 4/20 after considerable bleeding requiring transfusion. Hb now down again to 6.8.  4/23: WBC 23k. Fio2 40%. Tmax 38.1. US of stiumlator shows small fluid collection but no drainable abscess. Pending MICAH today. Pending L chest tube placement  4/24: Continue fever 100.8-101.8. WBC 16,600. MICAH shows large vegetations on both tricuspid valve and mitral valve with mild TR & MR.  No aortic root abscess. Left chest tube placed yesterday yielding 8 ml of old bloody fluid. Bronch today revealed copious thick maroon secretions in distal trachea and both mainstem bronchi. On the right these were obstructive. Remaining off pressors. Last positive blood cultures (MSSA) were 4/16, negative 4/17.  4/25: Fever 100.6 this AM. wBC 13,300. Hb 6.6. CT: right hydropneumothorax increasing, right bronchopleural fistula, mediastinal shift ot the left , multiple cavitary pulmonary nodules, 3.1 cm left basilar mass, splenomegaly. CT of head shows dural thickening over the clivus. Lac+ GNR >100,000 col/ml growing from BAL of 4/24, presumed Klebsiella. Left peural fluid culture negative from 4/23.  4/26: Fever 100.4 last night. WBC 13,500. Hb 7.4. Plts 502,000. ESR >120. UA fairly clear except 30 mg% protein, 2-5 wbc and rare rbc. CXR unchanged except more prominent pulmonary edema. GNR in BAL may be a different species of Klebsiella, and resistant to  ampicillin & tmp-sulfa; confirmation pending.  4/28: Fever 100.8 last night. WBC 27,700. Hb 6.6. Plts 482,000. Creat 0.19. Kleb pneum in BAL on 4/24 is resistant to ampicillin & tmp-sulfa but otherwise sensitive. O2 sat 96% on FIO2 30% now, PEEP 8. Has been re-evaluated by thoracic surgeon, Dr. Ganser, who believes she will not wean without decortication on the right. Surgery anticipated today.  4/29: S/P right thoracoscopy with decortication with cultures NG at 24 hrs.  4/30: Had a bronch due to hypoxia and hypotension. CXR with worsening right hemithorax pulmonary opacities. Bloody mucous plugs removed. Pressors started. Febrile this am. Tachy in 130s. Pressors just removed. Tolerating vent, but not a SBT candidate at the moment.  5/1: Small air leak CT-2 every ~ 2/3 breathes. The K. pneumoniae from her thoracoscopy is sensitive to her cefepime so no antibiotic change needed.   5/2: No air leak today she tolerated 2  hrs of spontaneous breathing with support today.      5/3: Second day with SBT and doing well now for 2 hrs. Slight bump in temperature and      WBC's but no obvious clinical infectious changed so watch closely.    Interval History:  Past 24 hrs reviewed with RN.    Labs Reviewed, Medications Reviewed, Radiology Reviewed, Wound Reviewed, Fluids Reviewed and GI Nutrition Reviewed    Review of Systems:  Review of Systems   Unable to perform ROS: Intubated       Physical Exam:  Physical Exam  Vitals and nursing note reviewed.   Constitutional:       General: She is not in acute distress.  HENT:      Head: Normocephalic and atraumatic.   Cardiovascular:      Rate and Rhythm: Tachycardia present.      Heart sounds: No murmur. No gallop.    Pulmonary:      Effort: Pulmonary effort is normal. No respiratory distress.      Breath sounds: No wheezing or rhonchi.      Comments: On spontaneous wit PS of 10 and RR of 14.  Abdominal:      General: Abdomen is flat. There is no distension.      Palpations: Abdomen  is soft.      Tenderness: There is no abdominal tenderness. There is no guarding.   Skin:     General: Skin is warm and dry.   Neurological:      Comments: Sedated grimaces to pain.         Labs:  Recent Results (from the past 24 hour(s))   CBC with Differential    Collection Time: 05/03/21  5:20 AM   Result Value Ref Range    WBC 18.0 (H) 4.8 - 10.8 K/uL    RBC 2.57 (L) 4.20 - 5.40 M/uL    Hemoglobin 7.8 (L) 12.0 - 16.0 g/dL    Hematocrit 24.2 (L) 37.0 - 47.0 %    MCV 94.2 81.4 - 97.8 fL    MCH 30.4 27.0 - 33.0 pg    MCHC 32.2 (L) 33.6 - 35.0 g/dL    RDW 56.7 (H) 35.9 - 50.0 fL    Platelet Count 383 164 - 446 K/uL    MPV 8.9 (L) 9.0 - 12.9 fL    Neutrophils-Polys 78.90 (H) 44.00 - 72.00 %    Lymphocytes 8.00 (L) 22.00 - 41.00 %    Monocytes 8.40 0.00 - 13.40 %    Eosinophils 0.00 0.00 - 6.90 %    Basophils 0.70 0.00 - 1.80 %    Immature Granulocytes 4.00 (H) 0.00 - 0.90 %    Nucleated RBC 0.30 /100 WBC    Neutrophils (Absolute) 14.18 (H) 2.00 - 7.15 K/uL    Lymphs (Absolute) 1.44 1.00 - 4.80 K/uL    Monos (Absolute) 1.50 (H) 0.00 - 0.85 K/uL    Eos (Absolute) 0.00 0.00 - 0.51 K/uL    Baso (Absolute) 0.12 0.00 - 0.12 K/uL    Immature Granulocytes (abs) 0.72 (H) 0.00 - 0.11 K/uL    NRBC (Absolute) 0.05 K/uL   MAGNESIUM    Collection Time: 05/03/21  5:20 AM   Result Value Ref Range    Magnesium 1.6 1.5 - 2.5 mg/dL   PHOSPHORUS    Collection Time: 05/03/21  5:20 AM   Result Value Ref Range    Phosphorus 2.8 2.5 - 4.5 mg/dL   Comp Metabolic Panel    Collection Time: 05/03/21  5:20 AM   Result Value Ref Range    Sodium 128 (L) 135 - 145 mmol/L    Potassium 3.5 (L) 3.6 - 5.5 mmol/L    Chloride 101 96 - 112 mmol/L    Co2 22 20 - 33 mmol/L    Anion Gap 5.0 (L) 7.0 - 16.0    Glucose 93 65 - 99 mg/dL    Bun 15 8 - 22 mg/dL    Creatinine <0.17 (L) 0.50 - 1.40 mg/dL    Calcium 8.1 (L) 8.5 - 10.5 mg/dL    AST(SGOT) 29 12 - 45 U/L    ALT(SGPT) 30 2 - 50 U/L    Alkaline Phosphatase 374 (H) 30 - 99 U/L    Total Bilirubin 0.7 0.1 -  1.5 mg/dL    Albumin 2.0 (L) 3.2 - 4.9 g/dL    Total Protein 6.5 6.0 - 8.2 g/dL    Globulin 4.5 (H) 1.9 - 3.5 g/dL    A-G Ratio 0.4 g/dL   ESTIMATED GFR    Collection Time: 05/03/21  5:20 AM   Result Value Ref Range    GFR If African American >60 >60 mL/min/1.73 m 2    GFR If Non African American >60 >60 mL/min/1.73 m 2     Results     Procedure Component Value Units Date/Time    CULTURE TISSUE W/ GRM STAIN [[id number]]  (Abnormal)  (Susceptibility) Collected: 04/28/21 1711    Order Status: Completed Specimen: Tissue Updated: 05/01/21 1241     Significant Indicator POS     Source TISS     Site Right Pleural Debris     Culture Result -     Gram Stain Result Rare WBCs.  No organisms seen.       Culture Result Klebsiella pneumoniae  Light growth      Narrative:      Surgery Specimen    Susceptibility     Klebsiella pneumoniae (1)     Antibiotic Interpretation Microscan Method Status    Ceftriaxone Sensitive <=1 mcg/mL AUGUSTINA Final    Cefazolin Sensitive <=2 mcg/mL AUGUSTINA Final    Ciprofloxacin Sensitive <=0.25 mcg/mL AUGUSTINA Final    Cefepime Sensitive <=2 mcg/mL AUGUSTINA Final    Cefuroxime Sensitive <=4 mcg/mL AUGUSTINA Final    Ampicillin/sulbactam Sensitive 8/4 mcg/mL AUGUSTINA Final    Ertapenem Sensitive <=0.5 mcg/mL AUGUSTINA Final    Tobramycin Sensitive <=2 mcg/mL AUGUSTINA Final    Gentamicin Sensitive <=2 mcg/mL AUGUSTINA Final    Moxifloxacin Sensitive <=2 mcg/mL AUGUSTINA Final    Pip/Tazobactam Sensitive <=8 mcg/mL AUGUSTINA Final    Trimeth/Sulfa Resistant >2/38 mcg/mL AUGUSTINA Final    Tigecycline Sensitive <=2 mcg/mL AUGUSTINA Final                   Anaerobic Culture [[id number]] Collected: 04/28/21 1711    Order Status: Completed Specimen: Tissue Updated: 05/01/21 1241     Significant Indicator NEG     Source TISS     Site Right Pleural Debris     Culture Result No Anaerobes isolated.    Narrative:      Surgery Specimen    BLOOD CULTURE [[id number]] Collected: 04/25/21 1248    Order Status: Completed Specimen: Blood from Peripheral Updated: 04/30/21 1500     Significant  "Indicator NEG     Source BLD     Site PERIPHERAL     Culture Result No growth after 5 days of incubation.    Narrative:      Collected By:[id number] JAELYN SHUKLA.  Per Hospital Policy: Only change Specimen Src: to \"Line\" if  specified by physician order.  Right Forearm/Arm    BLOOD CULTURE [[id number]] Collected: 04/25/21 1248    Order Status: Completed Specimen: Blood from Peripheral Updated: 04/30/21 1500     Significant Indicator NEG     Source BLD     Site PERIPHERAL     Culture Result No growth after 5 days of incubation.    Narrative:      Collected By:[id number] JAELYN SHUKLA.  Per Hospital Policy: Only change Specimen Src: to \"Line\" if  specified by physician order.  Left Forearm/Arm    GRAM STAIN [[id number]] Collected: 04/28/21 1711    Order Status: Completed Specimen: Tissue Updated: 04/29/21 0011     Significant Indicator .     Source TISS     Site Right Pleural Debris     Gram Stain Result Rare WBCs.  No organisms seen.      Narrative:      Surgery Specimen    SARS-CoV-2 PCR (24 hour In-House): Collect NP swab in [facility] [[id number]] Collected: 04/27/21 2215    Order Status: Completed Specimen: Respirate from Nasal Updated: 04/28/21 1033     SARS-CoV-2 Source NP Swab     SARS-CoV-2 by PCR NotDetected     Comment: PATIENTS: Important information regarding your results and instructions can  be found at [email or web address]   \"After your  Covid-19 Test\"  [facility] providers: PLEASE REFER TO DE-ESCALATION AND RETESTING PROTOCOL  on inside[email or web address]  **The TaqPath COVID-19 SARS-CoV-2 RT-PCR test has been made available for  use under the Emergency Use Authorization (EUA) only.         Narrative:      Collected By:[id number] ANNE SANCHEZ  Have you been in close contact with a person who is suspected  or known to be positive for COVID-19 within the last 30 days  (e.g. last seen that person < 30 days ago)->Unknown    HIV Rapid Screen (Exposure) [[id number]] Collected: 04/26/21 1430    Order " Status: Completed Specimen: Blood Updated: 04/26/21 1522     HIV Ag/Ab Combo Assay Non-Reactive     Comment: Roche HIV is a diagnostic test for the qualitative determination of HIV-1  p24 antigen and antibodies to HIV-1 (HIV-1 groups M and O) and HIV-2 in  human serum and plasma. A negative screen does not preclude the possibility  of exposure or infection. Consider retesting in high-risk patients, if  clinically indicated.         Narrative:      Enter exposed person's MRN only (do not enter employee's  name):->[id number]  Exposed person's employer?->[facility]    Acid Fast Stain [[id number]] Collected: 04/24/21 0115    Order Status: Completed Specimen: Respirate Updated: 04/26/21 1253     Significant Indicator NEG     Source RESP     Site BRONCHOALVEOLAR LAVAGE     AFB Smear Results No acid fast bacilli seen.    Narrative:      Collected By:[id number] UQANG HARRELL.  Which Lobe (Bronch Only):->LLL  Collected By:[facility] QUANG HOFFMANN S.  BAL from RLL  Collected By:[facility] QUANG HARRELL.    Quant Bronchial Washing [[id number]]  (Abnormal)  (Susceptibility) Collected: 04/24/21 0115    Order Status: Completed Specimen: Respirate from Bronchoalveolar Lavage Updated: 04/26/21 1252     Significant Indicator POS     Source RESP     Site BRONCHOALVEOLAR LAVAGE     Culture Result 500 cfu/mL usual upper respiratory sari  No clinically significant Staphylococcus aureus, Methicillin  Resistant Staphylococcus aureus, or Pseudomonas species  isolated.       Gram Stain Result Moderate WBCs.  Rare Gram negative rods.       Culture Result Klebsiella pneumoniae  >100,000 cfu/mL      Narrative:      Collected By:[facility] QUANG HARRELL.  Which Lobe (Bronch Only):->LLL  Collected By:[facility] QUANG HOFFMANN S.  BAL from RLL  Collected By:[facility] QUANG HOFFMANN S.    Susceptibility     Klebsiella pneumoniae (1)     Antibiotic Interpretation Microscan Method Status    Ampicillin Resistant >16 mcg/mL AUGUSTINA Final    Ceftriaxone Sensitive <=1 mcg/mL  AUGUSTINA Final    Ceftazidime Sensitive <=1 mcg/mL AUGUSTINA Final    Cefazolin Sensitive <=2 mcg/mL AUGUSTINA Final    Ciprofloxacin Sensitive <=0.25 mcg/mL AUGUSTINA Final    Cefepime Sensitive <=2 mcg/mL AUGUSTINA Final    Ampicillin/sulbactam Sensitive <=4/2 mcg/mL AUGUSTINA Final    Cefuroxime Sensitive <=4 mcg/mL AUGUSTINA Final    Tobramycin Sensitive <=2 mcg/mL AUGUSTINA Final    Ertapenem Sensitive <=0.5 mcg/mL AUGUSTINA Final    Gentamicin Sensitive <=2 mcg/mL AUGUSTINA Final    Moxifloxacin Sensitive <=2 mcg/mL AUGUSTINA Final    Pip/Tazobactam Sensitive <=8 mcg/mL AUGUSTINA Final    Trimeth/Sulfa Resistant >2/38 mcg/mL AUGUSTINA Final    Tigecycline Sensitive <=2 mcg/mL AUGUSTINA Final                   GRAM STAIN [[id number]] Collected: 21 0115    Order Status: Completed Specimen: Respirate Updated: 21 1252     Significant Indicator .     Source RESP     Site BRONCHOALVEOLAR LAVAGE     Gram Stain Result Moderate WBCs.  Rare Gram negative rods.      Narrative:      Collected By:[id number] QUANG VALERIO  Which Lobe (Bronch Only):->LLL  Collected By:[id number] QUANG HARRELL.  BAL from RLL  Collected By:[id number] QUANG VALERIO        Hemodynamics:  Temp (24hrs), Av.1 °C (100.6 °F), Min:37.5 °C (99.5 °F), Max:38.6 °C (101.5 °F)  Temperature: (!) 38.6 °C (101.5 °F), Monitored Temp: 37.4 °C (99.3 °F)  Pulse  Av.1  Min: 40  Max: 149   Blood Pressure: 109/52, Arterial BP: 116/59    Arterial Line 21 Radial (Active)   Site Assessment Clean;Dry;Intact 21   Line Status Pulsatile blood flow 21   Art Line Waveform Appropriate;Square wave test performed 21   Line Care Flushed per protocol;Leveled;Zeroed 21   Color/Movement/Sensation Pale fingers/toes;Capillary refill greater than 3 sec 21   Perfusion Check Left 21   Dressing Type Transparent 21   Dressing Status Clean;Dry;Intact 21   Dressing Intervention N/A 21   Dressing Change Due 21   Date Primary Tubing  Changed 05/01/21 05/02/21 2000   Date IV Connector(s) Changed 05/01/21 05/02/21 2000   NEXT Primary Tubing Change  05/04/21 05/02/21 2000   Cuff Pressure Correlates Yes 05/02/21 2000       Peripheral IV 22 G Left Upper arm (Active)   Site Assessment Clean;Dry;Intact 05/02/21 2000   Dressing Type Transparent 05/02/21 2000   Line Status No blood return;Flushed;Scrubbed the hub prior to access;Saline locked 05/02/21 2000   Dressing Status Clean;Dry;Intact 05/02/21 2000   Dressing Intervention N/A 05/02/21 2000   Dressing Change Due 05/08/21 05/02/21 2000   Date Primary Tubing Changed 05/04/21 05/02/21 0800   Date Secondary Tubing Changed 05/02/21 05/02/21 0800   NEXT Primary Tubing Change  05/04/21 05/02/21 0800   NEXT Secondary Tubing Change  05/03/21 05/02/21 0800   Infiltration Grading ([facility], [facility]) 0 05/02/21 2000   Phlebitis Scale ([facility] Only) 0 05/02/21 2000       PICC Single Lumen 05/01/21 Right Brachial (Active)   Site Assessment Clean;Dry;Intact 05/02/21 2000   Line Status Blood return noted;Flushed;Scrubbed the hub prior to access;Saline locked 05/02/21 2000   Line Secured at (cm) 0 cm 05/01/21 1507   Extremity Circumference (cm) 33 cm 05/01/21 1507   Dressing Type Biopatch;Occlusive;Transparent Film 05/02/21 2000   Dressing Status Clean;Dry;Intact 05/02/21 2000   Dressing Intervention N/A 05/02/21 2000   Dressing Change Due 05/08/21 05/02/21 2000   Date IV Connector(s) Changed 05/01/21 05/02/21 2000   NEXT Primary Tubing Change  05/04/21 05/02/21 2000   NEXT Secondary Tubing Change  05/03/21 05/02/21 0800   NEXT IV Connector(s) Change 05/04/21 05/02/21 2000   Line Necessity Assessed Antibiotic Therapy Greater than 7 Days 05/02/21 2000       CVC Triple Lumen 04/21/21 Left Subclavian (Active)   Consider Removal of Femoral Line Not Applicable 05/02/21 2000   Site Assessment Clean;Dry;Intact 05/02/21 2000   Lumen 1 Status Blood return noted;Flushed;Normal saline locked;Scrubbed the hub prior to access 05/02/21  2000   Lumen 3 Status Infusing 05/02/21 2000   Lumen 2 Status Infusing 05/02/21 2000   Dressing Type Biopatch;Occlusive;Transparent Film 05/02/21 2000   Dressing Status Clean;Dry;Intact 05/02/21 2000   Dressing Intervention N/A 05/02/21 2000   Dressing Change Due 05/08/21 05/02/21 2000   Date Primary Tubing Changed 05/01/21 05/02/21 2000   Date Secondary Tubing Changed 05/03/21 05/02/21 2000   Date IV Connector(s) Changed 05/01/21 05/02/21 2000   NEXT Primary Tubing Change  05/04/21 05/02/21 2000   NEXT Secondary Tubing Change  05/03/21 05/02/21 2000   NEXT IV Connector(s) Change 05/04/21 05/02/21 2000   Line Necessity Assessed Antibiotic Therapy Greater than 7 Days 05/02/21 2000     Wound:  @WOUNDLDA(4)@     Fluids:  Intake/Output       05/01/21 0700 - 05/02/21 0659 05/02/21 0700 - 05/03/21 0659 05/03/21 0700 - 05/04/21 0659      0405-8401 3080-3073 Total 0206-9920 0426-6077 Total 0703-5222 0530-0316 Total       Intake    P.O.  0  -- 0  --  -- --  --  -- --    P.O. 0 -- 0 -- -- -- -- -- --    I.V.  660.2  332 992.2  392.4  57.4 449.8  365.7  -- 365.7    Albumin Volume 200 -- 200 -- -- -- -- -- --    Magnesium Sulfate Volume 59.2 -- 59.2 50 -- 50 50 -- 50    Ketamine Volume 348.8 257.4 606.2 248.4 41.4 289.8 227.7 -- 227.7    Phenylephrine Volume 4.5 -- 4.5 -- -- -- -- -- --    Midazolam Volume 47.8 74.6 122.4 94 16 110 88 -- 88    Norepinephrine Volume 0 -- 0 -- -- -- -- -- --    Blood  --  -- --  400  -- 400  --  -- --    Volume (RELEASE RED BLOOD CELLS) -- -- -- 400 -- 400 -- -- --    Other  --  160 160  --  -- --  --  -- --    Medications (PO/Enteral Liquids) -- 160 160 -- -- -- -- -- --    NG/GT  50  400 450  540  630 1170  --  -- --    Intake (mL) (Enteral Tube 04/21/21 Dobhoff - Gastric Right nare) 50 400 450  -- -- --    IV Piggyback  100  -- 100  --  -- --  --  -- --    Volume (mL) (potassium chloride in water (KCL) ivpb **Administer in ICU only** 20 mEq) 0 -- 0 -- -- -- -- -- --    Volume (mL)  (potassium chloride in water (KCL) ivpb **Administer in ICU only** 20 mEq) 0 -- 0 -- -- -- -- -- --    Volume (mL) (potassium chloride in water (KCL) ivpb **Administer in ICU only** 20 mEq) 0 -- 0 -- -- -- -- -- --    Volume (mL) (potassium chloride (KCL) ivpb 10 mEq) 100 -- 100 -- -- -- -- -- --    Enteral  90  90 180  290  90 380  --  -- --    Free Water / Tube Flush 90 90 180 290 90 380 -- -- --    Total Intake 900.2 982 1882.2 1622.4 777.4 2399.8 365.7 -- 365.7       Output    Urine  470  810 1280  2080  800 2880  --  -- --    Output (mL) (Urethral Catheter)  2080 800 2880 -- -- --    Drains  --  -- --  0  -- 0  --  -- --    Residual Amount (mL) (Discarded) (Enteral Tube 04/21/21 Dobhoff - Gastric Right nare) -- -- -- 0 -- 0 -- -- --    Stool  --  -- --  --  -- --  --  -- --    Number of Times Stooled -- -- -- 0 x 1 x 1 x -- -- --    Emesis/NG output  0  -- 0  0  -- 0  --  -- --    Output (mL) (Enteral Tube 04/21/21 Dobhoff - Gastric Right nare) 0 -- 0 0 -- 0 -- -- --    Chest Tube  90  -- 90  30  0 30  --  -- --    Output (mL) (Chest Tube 2 Right Midaxillary) 40 -- 40 30 0 30 -- -- --    Output (mL) (Chest Tube 1 Right Midaxillary;Pleural) 50 -- 50 0 0 0 -- -- --    Total Output  2110 800 2910 -- -- --       Net I/O     340.2 172 512.2 -487.6 -22.6 -510.2 365.7 -- 365.7           GI/Nutrition:  Orders Placed This Encounter   Procedures   • Diet NPO     Standing Status:   Standing     Number of Occurrences:   1     Order Specific Question:   Restrict to:     Answer:   Strict [1]     Medications:  Current Facility-Administered Medications   Medication Last Admin   • oxyCODONE immediate-release (ROXICODONE) tablet 15 mg 15 mg at 05/03/21 1012   • HYDROmorphone (Dilaudid) injection 1-2 mg 1 mg at 05/03/21 0629   • furosemide (LASIX) injection 20 mg 20 mg at 05/03/21 0530   • carvedilol (COREG) tablet 6.25 mg 6.25 mg at 05/03/21 0644   • potassium chloride SA (Kdur) tablet 40 mEq 40 mEq at  05/03/21 0530   • midazolam (Versed) 2 MG/2ML injection 2 mg 2 mg at 05/03/21 0644   • midazolam (VERSED) premix 125 mg/125 mL NS 6 mg/hr at 05/03/21 1011   • ketamine 1,000 mg in  mL infusion (critical care only) 0.5 mg/kg/hr at 05/03/21 0702   • labetalol (NORMODYNE/TRANDATE) injection 10 mg 10 mg at 05/02/21 2157   • enoxaparin (LOVENOX) inj 40 mg 40 mg at 05/03/21 0529   • ceFEPIme (MAXIPIME) 1 g in  mL IVPB Stopped at 05/03/21 0601   • Pharmacy Consult: Enteral tube insertion - review meds/change route/product selection     • acetaminophen (Tylenol) tablet 650 mg 650 mg at 05/03/21 0643   • magnesium hydroxide (MILK OF MAGNESIA) suspension 30 mL      And   • senna-docusate (PERICOLACE or SENOKOT S) 8.6-50 MG per tablet 2 tablet 2 tablet at 05/03/21 0530    And   • polyethylene glycol/lytes (MIRALAX) PACKET 1 Packet      And   • bisacodyl (DULCOLAX) suppository 10 mg     • divalproex (DEPAKOTE SPRINKLE) capsule 500 mg 500 mg at 05/03/21 0529   • DULoxetine (CYMBALTA) capsule 60 mg 60 mg at 05/03/21 0530   • PARoxetine (PAXIL) tablet 10 mg 10 mg at 05/03/21 0530   • risperiDONE (RISPERDAL) tablet 2 mg 2 mg at 05/03/21 0530   • Respiratory Therapy Consult     • famotidine (PEPCID) tablet 20 mg 20 mg at 05/02/21 1711    Or   • famotidine (PEPCID) injection 20 mg 20 mg at 05/03/21 0529   • MD Alert...ICU Electrolyte Replacement per Pharmacy     • lidocaine (XYLOCAINE) 1 % injection 1-2 mL     • ipratropium-albuterol (DUONEB) nebulizer solution       Medical Decision Making, by Problem:  Active Hospital Problems    Diagnosis    • *Acute bacterial endocarditis [I33.0]    • Agitation requiring sedation protocol [R45.1]    • 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]    • Acute respiratory failure with hypoxia (HCC) [J96.01]    • Empyema of right pleural space (HCC) [J86.9]    • Acute encephalopathy [G93.40]    • Fever [R50.9]    • Pleural effusion, left [J90]    • Atelectasis [J98.11]    • CSF pleocytosis [D72.9]    • Pneumonia  [J18.9]    • Tachycardia [R00.0]    • Pseudotumor cerebri [G93.2]    • S/P  shunt [Z98.2]    • Prolonged Q-T interval on ECG [R94.31]    • History of vasculitis [Z86.79]    • History of complicated migraines [G43.109]    • Hyponatremia [E87.1]    • H/O: CVA (cerebrovascular accident) [Z86.73]    • Chronic pain syndrome [G89.4]    • Thrombocytopenia (HCC) [D69.6]    • Spinal cord stimulator status [Z96.89]    • Goals of care, counseling/discussion [Z71.89]    • Abnormal movement [G25.9]    • Pulmonary abscess (HCC) [J85.2]    • Anasarca [R60.1]    • Thrombocytosis (HCC) [D47.3]    • Bacteremia due to methicillin susceptible Staphylococcus aureus (MSSA) [R78.81, B95.61]    • GERD (gastroesophageal reflux disease) [K21.9]    • Septic shock (HCC) [A41.9, R65.21]    • Anemia [D64.9]    • Hypokalemia [E87.6]        Impression   -Severe sepsis, now septic shock  -Catheter related bloodstream infection due to infected port status post removal 4/12-staph aureus  -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       - Acute left empyema s/p chest tube placement 4/23  -Multiple acute septic pulmonary emboli bilaterally  -Acute bilateral pneumonia due to above     --Pulmonary edema  -Pseudotumor cerebri with underlying  shunt: possible arachnoiditis  -Chronic migraine headaches  -History of autoimmune vasculitis  -Elevated alkaline phosphatase & bilirubin  -Acute encephalopathy due to sepsis      - anemia due to above      - New secondary Klebsiella pneumoniae pneumonia (presumed VAP)     Plan   Patient has evidence of disseminated staphylococcal sepsis source from her line with infection to her lungs, tricuspid & mitral valves and  bilat empyema requiring transfer to [facility] for decortication due to lack of CT surgery support at [facility]".  Patient with bilateral cavitary lung abscess and empyema.   - Persistent  fever likely attributable to empyema and multiple lung abscesses. Currently covered.  - Continue cefepime  - US of spinal stimulator shows ill defined fluid collection, but undrainable. NSGY does not recommend stiumulator removal due to instability      - Consider MRI of brain when clinically feasible. Spinal stimulator may prevent MRI.   - Central line exchanged 4/22  - Initial CSF cultures has pleocytosis, but no culture growth. CSF findings on 4/13:  63 wbc, 82% PMN, protein 97  - Chest tube upsized by surgery 4/17.   - Blood culture from 4/14 at [facility] positive. Admission blood cultures here on 4/16 both positive.  4/17 blood cultures NGTD  - original infected port removed 4/12  - vent management per pulmonology  - Tolerated the decortication. Cx NGTD.   - Had bloody mucous plugs removed by bronch  - Now off pressors   - No IV abx changes or further ID workup in the moment  - Discussion for trach I hear is pending decision.         No air leak today. The K. pneumoniae from her thoracoscopy is sensitive to her cefepime so no antibiotic change needed. Second day with SBT and doing well now for 2 hrs. Slight bump in temperature and WBC's but no obvious clinical infectious changed so watch closely. Case and treatment reviewed with micro and RN > 30+ min on floor in ICU with > 50% face to face view and 100% in direct patient care/counseling/consulting today.

## 2021-05-03 NOTE — CARE PLAN
Problem: Communication  Goal: The ability to communicate needs accurately and effectively will improve  Outcome: PROGRESSING SLOWER THAN EXPECTED     Problem: Safety  Goal: Will remain free from injury  Outcome: PROGRESSING AS EXPECTED  Goal: Will remain free from falls  Outcome: PROGRESSING AS EXPECTED     Problem: Infection  Goal: Will remain free from infection  Outcome: PROGRESSING SLOWER THAN EXPECTED     Problem: Safety - Medical Restraint  Goal: Free from restraint(s) (Restraint for Interference with Medical Device)  Description: INTERVENTIONS:  1. ONCE/SHIFT or MINIMUM Q12H: Assess and document the continuing need for restraints  2. Q24H: Continued use of restraint requires LIP to perform face to face examination and written order  3. Identify and implement measures to help patient regain control  Outcome: PROGRESSING SLOWER THAN EXPECTED     Problem: Respiratory:  Goal: Respiratory status will improve  Outcome: PROGRESSING SLOWER THAN EXPECTED     Problem: Pain Management  Goal: Pain level will decrease to patient's comfort goal  Outcome: PROGRESSING SLOWER THAN EXPECTED     Problem: Psychosocial Needs:  Goal: Level of anxiety will decrease  Outcome: PROGRESSING SLOWER THAN EXPECTED

## 2021-05-03 NOTE — PROGRESS NOTES
"Critical Care Progress Note    Date of admission  4/16/2021    Chief Complaint  50 y.o. female the past medical history of pseudotumor cerebri status post  shunt by Dr. Oh, chronic pain with history of placement of nerve stimulator, vasculitis, right anterior chest port for home IV meds with recurrent infection who presented 4/11/2021 with to [facility] with recurrent port infection and was initially treated with vancomycin and Zosyn and then changed to ceftaroline and subsequently switched to nafcillin when MSSA was identified.    Hospital Course  \"50 y.o. female the past medical history of pseudotumor cerebri status post  shunt by Dr. Oh, chronic pain with history of placement of nerve stimulator, vasculitis, right anterior chest port for home IV meds with recurrent infection who presented 4/11/2021 with to [facility] with recurrent port infection. Found to have MSSA bacteremia, endocarditis, septic pulmonary emboli/empyema/pneumatocele, and CSF pleocytosis concerning for  shunt involvement.  Seen by Dr. Oh, NSGY, at [facility] who did not recommend  shunt removal.  Seen by Dr. Vargas here for concern of fluid collection around her spinal stimulator and he recommended against removal. Remains intubated, encephalopathic.    4/16 admitted to ICU  4/17 VD 2, 2 FFP, pRBC overnight; new chest tube per gen surg  4/18 VD 3, TPa/Dornase with 1400 cc from R chest tube    4/26: R chest tube not functioning, d/c it.  broke his leg and going to OR today, so not available currently.  VD 11.  8/30%. RSBI immediately high on SBT attempt. Followed commands for RN  4/27: VD12. 8/30%. Not tolerating wean, high RSBI and tachycardic.   4/28: VD 13. VATS with Dr. Ganser.    4/29: VD 14. 8/30%. Still not tolerating wean d/t severe tachycardia just with SAT. Trialed precedex and she required fentanyl up to 600/hr to tolerate, so back on propofol. Chest tubes -40  4/30: VD 15. 8/30%. Try ketamine/versed as sedative. Severe " tachycardia and HTN with weaning down propofol. Plan for perc trach tomorrow. Chest tubes -40  5/1: perc trach. PICC placed. Chest tubes remain -40.  5/2: Able to spont well on sedation, but very tachy/HTN when sedation is turned down.      Interval Problem Update  Neuro: very tachy/HTN on SAT. Didn't follow on SAT, just grimaced.   HR: -140s  SBP: 110-120s  Tmax: 38.1  GI: BM yest, TF at goal. ?change goal off propofol  I/O: nevarez 810 overnight  Lines: PICC, 2 chest tubes, d/c left subclavian  Mobility: passive ROM  Resp: trach day 2, 24/300/8/30%   Vte: lovenox  PPI/H2:pepcid  Antibx: cefepime prolonged course, MSSA, klebsiella    Replete lytes        Review of Systems  Review of Systems   Unable to perform ROS: Intubated        Vital Signs for last 24 hours   Temp:  [37.5 °C (99.5 °F)-38.2 °C (100.8 °F)] 38.2 °C (100.8 °F)  Pulse:  [101-129] 129  Resp:  [26-41] 35  BP: (107-165)/(63-91) 144/86  SpO2:  [95 %-99 %] 95 %    Hemodynamic parameters for last 24 hours       Respiratory Information for the last 24 hours  Vent Mode: APVCMV  Rate (breaths/min): 24  Vt Target (mL): 300  PEEP/CPAP: 8  P Support: 10  MAP: 13  Length of Weaning Trial (Hours): 54 minutes  Control VTE (exp VT): 409    Physical Exam   Physical Exam  Constitutional:       Appearance: She is ill-appearing.      Interventions: She is sedated and intubated.   HENT:      Mouth/Throat:      Mouth: Mucous membranes are moist.   Eyes:      Pupils: Pupils are equal, round, and reactive to light.   Cardiovascular:      Rate and Rhythm: Regular rhythm. Tachycardia present.      Heart sounds: Murmur present.   Pulmonary:      Effort: She is intubated.      Comments: Improving respiratory movement of R chest    R chest tubes. No air leaks  Abdominal:      General: Bowel sounds are normal.      Tenderness: There is no abdominal tenderness. There is no guarding.   Musculoskeletal:      Right lower leg: Edema present.      Left lower leg: Edema present.       Comments: Generalized anasarca   Skin:     General: Skin is warm and dry.   Neurological:      Comments: Deeply sedated   Psychiatric:      Comments: Unable to assess         Medications  Current Facility-Administered Medications   Medication Dose Route Frequency Provider Last Rate Last Admin   • potassium bicarbonate (KLYTE) effervescent tablet 50 mEq  50 mEq Enteral Tube Once Jonathan Upton D.O.       • magnesium sulfate IVPB premix 2 g  2 g Intravenous Once Jonathan Upton D.O.       • HYDROmorphone (Dilaudid) injection 1-2 mg  1-2 mg Intravenous Q2HRS PRN Jeremy M Gonda, M.D.   1 mg at 05/03/21 0629   • furosemide (LASIX) injection 20 mg  20 mg Intravenous BID DIURETIC Leti Sun M.D.   20 mg at 05/03/21 0530   • oxyCODONE 20 MG/ML concentrate 15 mg  15 mg Enteral Tube Q4HRS Leti Sun M.D.   15 mg at 05/03/21 0643   • carvedilol (COREG) tablet 6.25 mg  6.25 mg Enteral Tube BID WITH MEALS Leti Sun M.D.   6.25 mg at 05/03/21 0644   • potassium chloride SA (Kdur) tablet 40 mEq  40 mEq Enteral Tube DAILY Leti Sun M.D.   40 mEq at 05/03/21 0530   • midazolam (Versed) 2 MG/2ML injection 2 mg  2 mg Intravenous Q HOUR PRN Leti Sun M.D.   2 mg at 05/03/21 0644   • midazolam (VERSED) premix 125 mg/125 mL NS  8 mg/hr Intravenous Continuous Leti Sun M.D. 8 mL/hr at 05/03/21 0702 8 mg/hr at 05/03/21 0702   • ketamine 1,000 mg in  mL infusion (critical care only)  0-2.5 mg/kg/hr Intravenous Continuous Leti Sun M.D. 20.7 mL/hr at 05/03/21 0702 0.5 mg/kg/hr at 05/03/21 0702   • labetalol (NORMODYNE/TRANDATE) injection 10 mg  10 mg Intravenous Q4HRS PRN Leti Sun M.D.   10 mg at 05/02/21 2157   • enoxaparin (LOVENOX) inj 40 mg  40 mg Subcutaneous DAILY Benoit Loya Jr., D.O.   40 mg at 05/03/21 0529   • ceFEPIme (MAXIPIME) 1 g in  mL IVPB  1 g Intravenous Q8HRS LEYLA Hinton.OFrederick 200 mL/hr at 05/03/21 0531 1 g at 05/03/21 0531   • Pharmacy Consult: Enteral tube  insertion - review meds/change route/product selection  1 Each Other PHARMACY TO DOSE Elliott Salvador M.D.       • acetaminophen (Tylenol) tablet 650 mg  650 mg Enteral Tube Q4HRS PRN Elliott Salvador M.D.   650 mg at 05/03/21 0643   • magnesium hydroxide (MILK OF MAGNESIA) suspension 30 mL  30 mL Enteral Tube QDAY PRN Elliott Salvador M.D.        And   • senna-docusate (PERICOLACE or SENOKOT S) 8.6-50 MG per tablet 2 tablet  2 tablet Enteral Tube BID Elliott Salvador M.D.   2 tablet at 05/03/21 0530    And   • polyethylene glycol/lytes (MIRALAX) PACKET 1 Packet  1 Packet Enteral Tube QDAY PRN Elliott Salvador M.D.        And   • bisacodyl (DULCOLAX) suppository 10 mg  10 mg Rectal QDAY PRN Elliott Salvador M.D.       • divalproex (DEPAKOTE SPRINKLE) capsule 500 mg  500 mg Enteral Tube Q8HRS Elliott Salvador M.D.   500 mg at 05/03/21 0529   • DULoxetine (CYMBALTA) capsule 60 mg  60 mg Enteral Tube BID Elliott Salvador M.D.   60 mg at 05/03/21 0530   • PARoxetine (PAXIL) tablet 10 mg  10 mg Enteral Tube QAM Elliott Salvador M.D.   10 mg at 05/03/21 0530   • risperiDONE (RISPERDAL) tablet 2 mg  2 mg Enteral Tube BID Elliott Salvador M.D.   2 mg at 05/03/21 0530   • Respiratory Therapy Consult   Nebulization Continuous RT Man Wahl M.D.       • famotidine (PEPCID) tablet 20 mg  20 mg Enteral Tube Q12HRS Man Wahl M.D.   20 mg at 05/02/21 1711    Or   • famotidine (PEPCID) injection 20 mg  20 mg Intravenous Q12HRS Man Wahl M.D.   20 mg at 05/03/21 0529   • MD Alert...ICU Electrolyte Replacement per Pharmacy   Other PHARMACY TO DOSE Man Wahl M.D.       • lidocaine (XYLOCAINE) 1 % injection 1-2 mL  1-2 mL Tracheal Tube Q30 MIN PRN Man Wahl M.D.       • ipratropium-albuterol (DUONEB) nebulizer solution  3 mL Nebulization Q2HRS PRN (RT) Man Wahl M.D.           Fluids    Intake/Output Summary (Last 24 hours) at 5/3/2021 0740  Last data filed at 5/3/2021  0524  Gross per 24 hour   Intake 1799.77 ml   Output 2890 ml   Net -1090.23 ml       Laboratory  Recent Labs     05/01/21  0306   ISTATAPH 7.470   ISTATAPCO2 33.6   ISTATAPO2 76   ISTATATCO2 25   [id number] 96   ISTATARTHCO3 24.4   ISTATARTBE 1   ISTATTEMP 38.2 C   ISTATFIO2 30   ISTATSPEC Arterial   ISTATAPHTC 7.452   [id number] 82         Recent Labs     05/01/21  0551 05/02/21  0510 05/03/21  0520   SODIUM 134* 136 128*   POTASSIUM 3.9 3.2* 3.5*   CHLORIDE 104 104 101   CO2 25 23 22   BUN 12 12 15   CREATININE <0.17* <0.17* <0.17*   MAGNESIUM 1.5 1.7 1.6   PHOSPHORUS 2.7 3.1 2.8   CALCIUM 8.1* 8.0* 8.1*     Recent Labs     05/01/21  0551 05/02/21  0510 05/03/21  0520   ALTSGPT  --   --  30   ASTSGOT  --   --  29   ALKPHOSPHAT  --   --  374*   TBILIRUBIN  --   --  0.7   GLUCOSE 92 117* 93     Recent Labs     05/01/21  0551 05/02/21  0510 05/03/21  0520   WBC 16.8* 17.0* 18.0*   NEUTSPOLYS 74.10* 80.70* 78.90*   LYMPHOCYTES 8.60* 6.10* 8.00*   MONOCYTES 11.20 8.70 8.40   EOSINOPHILS 0.00 0.00 0.00   BASOPHILS 1.70 0.60 0.70   ASTSGOT  --   --  29   ALTSGPT  --   --  30   ALKPHOSPHAT  --   --  374*   TBILIRUBIN  --   --  0.7     Recent Labs     05/01/21 0551 05/02/21  0510 05/03/21  0520   RBC 2.41* 2.22* 2.57*   HEMOGLOBIN 7.1* 6.6* 7.8*   HEMATOCRIT 22.5* 21.0* 24.2*   PLATELETCT 424 403 383       Imaging  X-Ray:  I have personally reviewed the images and compared with prior images.    Assessment/Plan  * Bacteremia due to methicillin susceptible Staphylococcus aureus (MSSA)- (present on admission)  Assessment & Plan  Source presumed right anterior chest line/port with endocarditis  Multiple sources for MSSA including MV/TV vegetation, port, spinal stimulator/ shunt  Port has been removed  Last positive blood cultures were 4/17  Pt appeared to be on nafcillin from 4/16-4/23. Pt did receive MSSA therapy when she was in [facility].   Would touch base with ID regarding optimal therapy of MSSA bacteremia.            Agitation requiring sedation protocol  Assessment & Plan  Patient was on propofol/fentanyl for approximately 2 weeks  When sedation is weaned, she becomes extremely tachycardic and hypotensive  When fentanyl gtt was d/c'ed (even with oxycodone 10mg q4) her pupils became quite dilated, suggesting a component of opiate withdrawal  Pt is on oxycodone 15 Q4H and dilaudid prn.   On versed and ketamine gtt    We will slowly wean down/off versed and ketamine gtt.       Trapped lung  Assessment & Plan  Decortication 4/28  Continue chest tubes to suction and hope lungs expand  R chest is expanding somewhat.  There is now visible respiratory movement in the R chest and some improved expansion on CXR    Empyema of right pleural space (HCC)- (present on admission)  Assessment & Plan  R empyema due to septic pulmonary emboli  Chest tube placement 4/16  At [facility], Upsized 4/18 Dr Benedict, stopped functioning so d/c'ed 4/26  Received TPA/dornase from 4/18-20.  Stopped b/c Hgb dropped requiring transfusion  S/p VATS and decortication 4/28  May need another intervention in future, per Dr. Ganser  Culture from 4/28 pleural fluid growing klebsiella          Acute bacterial endocarditis- (present on admission)  Assessment & Plan  MSSA bacteremia at OSH and here on 4/16. First negative blood cultures were on 4/17  Infected port removed at [facility]   shunt and spinal stimulator could be seeded, NSGY has consulted on both and don't recommend removal at admission due to HD instability.   MV and TV vegetations with septic pulmonary emboli  Nataly ID Dr Omer was following.   K. Pneumonia empyema - on cefepime  Deemed not surgical candidate - No evidence based surgical indications (valvular CHF, difficult pathogen, etc) so no cardiovascular surgery consult is necessary at this time per prior note    Plan:   On cefepime   Received nafcilin from 4/16-4/22.   ID following        Acute respiratory failure with hypoxia (HCC)- (present on  admission)  Assessment & Plan  4/15 Intubated for acute hypoxic respiratory failure at [facility] due to hydro/pneumo, trapped lung, lung abscesses  4/16 transferred to [facility]   4/16-4/28 Difficult to wean due to ongoing right loculated hydropneumothorax and empyema. S/p 2 chest tubes  4/28 S/p right lung decortication by Dr. Ganser  5/1 s/p percutaneous tracheostomy    Concern of difficult to wean off sedation give prolonged sedation with prolonged intubation.   Pt was on versed gtt, scheduled oxycodone and dilaudid IV prn  4/30 ketamine gtt was started and planning to bridge and treat pain while weaning off versed    Plan:   Will continue wean off/down versed gtt. Versed gtt was slowly decreased to 3mcg/min today.   Doing well on SBT today.   RASS -1 to -2  Perc trach 5/1  Plan to wean off versed and ketamine gtt tomorrow  On oxycodone scheduled with dilaudid prn  Mobility level 1 at least.   Monitor UOP  Continue diuresis.   Tolerating tube feed      Acute encephalopathy- (present on admission)  Assessment & Plan  Likely multifactorial given CSF pleocytosis, sedation  Status post lumbar puncture with pleocytosis, negative Gram stain and cultures NGTD   shunt for pseudotumor cerebri  Not candidate for MRI d/t spinal stimulator  Ct repeat no intracranial abnormalities on 4/21  EEG with encephalopathy and no seizures  Minimize sedation as able  Concern of autoimmune cerebritis was entertained but this was not confirmed.     Pleural effusion, left  Assessment & Plan  Loculated fluid  Probably parapneumonic fluid from abscesses  CT guided chest tube placed 4/23, fluid sent  Lymph predominant, NGTD  TPA/dornase one dose on 4/24.  Hold d/t dropping Hgb  CT chest 4/25 with resolution of fluid  CT clamped 4/29-no significant accumulation of fluid so d/c 4/30    Fever  Assessment & Plan  Stable fever curve  R pleural space has trapped lung so impossible to completely clear/get source control      Pneumonia  Assessment  & Plan  MSSA septic emboli with empyema   VAE  BAL 4/20 klebsiella pneum  BAL 4/24 still heavy growth of klebsiella  4/28 OR pleural fluid specimen growing klebsiella.   On cefepime    CSF pleocytosis- (present on admission)  Assessment & Plan  Status post lumbar puncture 4/13  WBC 53, 82% polys, Glucose 47, protein 97 Gram stain negative and culture negative at [facility]  Neurosurgery aware,  shunt in place  MRI brain requested by neurosurgery at [facility], not candidate d/t stimulator  Repeat ct no embolic lesions  cefepime for CNS penetration  Once her clinical status improves, she will need a long term plan for her  shunt that could have been seeded    Acute blood loss anemia  Assessment & Plan  Hgb dropped again after TPA/dornase on 4/25  No clear source of bleed  D/c TPA/dornase  Hgb stable  Transfuse >7    Tachycardia  Assessment & Plan  Sinus  Gets severely tachycardiac with any attempt at sedation wean  This is likely a combination of prolonged use of sedatives, deconditioning, active cardiopulmonary illness.   I suspect there is some component of withdrawal so I think she needs a long slow taper of both opioid and marni-ergic agents  HTN and tachycardiac are precluding attempts at weaning from the ventilator, so will add coreg    S/P  shunt- (present on admission)  Assessment & Plan  History of pseudotumor cerebri  History of  shunt by Dr. Oh  Could be seeded by MSSA, Dr. Oh consulted at [facility], recommended MRI but unable given her spinal stimulator      Goals of care, counseling/discussion  Assessment & Plan  Palliative is consulting  Dr. Sun spoke with Benoit on 4/27. I explained my concern that even if Enedelia were to survive this her QOL would likely be quite a bit worse.  She won't be able to have another port and was receiving IV meds for HA 3-4 times weekly.  She also could have worse headaches after this episode of meningitis.  He asked if she could have brain damage, and I explained that was  possible but hard to know right now.  He says part of him thinks Enedelia would want to keep fighting and part of him thinks she would say to stop aggressive care because her QOL was already very limited by her headaches. Continue aggressive care for now.    Spinal cord stimulator status  Assessment & Plan  Patient's stimulator is [employer]. It is FDA approved as MRI compatible, but the company has gone out of business, so there is no support team to assist with MRI.  Thus, if MRI were performed, our radiologists would need to protocol it correctly to manage the stimulator. The former rep from [employer] is Andre, [phone].  Information obtained from Dr. Mahoney's office, [phone].    Pulmonary abscess (HCC)  Assessment & Plan  Septic emboli from TV endocarditis    Septic shock (HCC)- (present on admission)  Assessment & Plan  Shock resolved    Abnormal LFTs  Assessment & Plan  Chronic alk phos elevation  GGT high c/w liver source  Outpatient workup    Chronic pain syndrome- (present on admission)  Assessment & Plan  Chronic back pain and intractable headaches  History of nerve stimulator   Dr Vargas consulted, no plan to remove stimulator due to her clinical instability     VTE:  lovenox  Ulcer: H2 Antagonist  Lines: Central Line  Ongoing indication addressed and Lopez Catheter  Ongoing indication addressed    I have performed a physical exam and reviewed and updated ROS and Plan today (5/3/2021). In review of yesterday's note (5/2/2021), there are no changes except as documented above.       Discussed patient condition and risk of morbidity and/or mortality with Family, RN, RT, Pharmacy, Dietary, Code status disscussed and Charge nurse / hot rounds.      The patient remains critically ill requiring mechanical ventilation, difficulty weaning from vent, complicated pleural space.  Critical care time = 80 minutes in directly providing and coordinating critical care and extensive data review.  No time overlap and  excludes procedures

## 2021-05-03 NOTE — CARE PLAN
Ventilator Daily Summary    Vent Day #  V18 T3   Ventilator settings changed this shift:  Pt placed on spont as tolerated   Weaning trials:  Yes   Respiratory Procedures:  no  Plan: Continue current ventilator settings and wean mechanical ventilation as tolerated per physician orders.   24 300 8 30  Spont as tolerated

## 2021-05-04 ENCOUNTER — APPOINTMENT (OUTPATIENT)
Dept: RADIOLOGY | Facility: MEDICAL CENTER | Age: 51
DRG: 003 | End: 2021-05-04
Attending: INTERNAL MEDICINE
Payer: MEDICARE

## 2021-05-04 LAB
ANION GAP SERPL CALC-SCNC: 8 MMOL/L (ref 7–16)
BASOPHILS # BLD AUTO: 0.3 % (ref 0–1.8)
BASOPHILS # BLD: 0.07 K/UL (ref 0–0.12)
BUN SERPL-MCNC: 17 MG/DL (ref 8–22)
CALCIUM SERPL-MCNC: 8.2 MG/DL (ref 8.5–10.5)
CHLORIDE SERPL-SCNC: 104 MMOL/L (ref 96–112)
CO2 SERPL-SCNC: 24 MMOL/L (ref 20–33)
CREAT SERPL-MCNC: <0.17 MG/DL (ref 0.5–1.4)
EOSINOPHIL # BLD AUTO: 0 K/UL (ref 0–0.51)
EOSINOPHIL NFR BLD: 0 % (ref 0–6.9)
ERYTHROCYTE [DISTWIDTH] IN BLOOD BY AUTOMATED COUNT: 59 FL (ref 35.9–50)
GLUCOSE SERPL-MCNC: 108 MG/DL (ref 65–99)
HCT VFR BLD AUTO: 26.3 % (ref 37–47)
HGB BLD-MCNC: 8.2 G/DL (ref 12–16)
IMM GRANULOCYTES # BLD AUTO: 0.63 K/UL (ref 0–0.11)
IMM GRANULOCYTES NFR BLD AUTO: 2.9 % (ref 0–0.9)
LYMPHOCYTES # BLD AUTO: 1.21 K/UL (ref 1–4.8)
LYMPHOCYTES NFR BLD: 5.6 % (ref 22–41)
MAGNESIUM SERPL-MCNC: 1.7 MG/DL (ref 1.5–2.5)
MCH RBC QN AUTO: 30 PG (ref 27–33)
MCHC RBC AUTO-ENTMCNC: 31.2 G/DL (ref 33.6–35)
MCV RBC AUTO: 96.3 FL (ref 81.4–97.8)
MONOCYTES # BLD AUTO: 1.42 K/UL (ref 0–0.85)
MONOCYTES NFR BLD AUTO: 6.6 % (ref 0–13.4)
NEUTROPHILS # BLD AUTO: 18.14 K/UL (ref 2–7.15)
NEUTROPHILS NFR BLD: 84.6 % (ref 44–72)
NRBC # BLD AUTO: 0 K/UL
NRBC BLD-RTO: 0 /100 WBC
PHOSPHATE SERPL-MCNC: 3 MG/DL (ref 2.5–4.5)
PLATELET # BLD AUTO: 370 K/UL (ref 164–446)
PMV BLD AUTO: 9.1 FL (ref 9–12.9)
POTASSIUM SERPL-SCNC: 3.7 MMOL/L (ref 3.6–5.5)
PROCALCITONIN SERPL-MCNC: 0.13 NG/ML
RBC # BLD AUTO: 2.73 M/UL (ref 4.2–5.4)
SODIUM SERPL-SCNC: 136 MMOL/L (ref 135–145)
WBC # BLD AUTO: 21.5 K/UL (ref 4.8–10.8)

## 2021-05-04 PROCEDURE — 94003 VENT MGMT INPAT SUBQ DAY: CPT

## 2021-05-04 PROCEDURE — 84100 ASSAY OF PHOSPHORUS: CPT

## 2021-05-04 PROCEDURE — A9270 NON-COVERED ITEM OR SERVICE: HCPCS | Performed by: INTERNAL MEDICINE

## 2021-05-04 PROCEDURE — 85025 COMPLETE CBC W/AUTO DIFF WBC: CPT

## 2021-05-04 PROCEDURE — 700111 HCHG RX REV CODE 636 W/ 250 OVERRIDE (IP): Performed by: INTERNAL MEDICINE

## 2021-05-04 PROCEDURE — 83735 ASSAY OF MAGNESIUM: CPT

## 2021-05-04 PROCEDURE — 87040 BLOOD CULTURE FOR BACTERIA: CPT

## 2021-05-04 PROCEDURE — 84145 PROCALCITONIN (PCT): CPT

## 2021-05-04 PROCEDURE — 700102 HCHG RX REV CODE 250 W/ 637 OVERRIDE(OP): Performed by: INTERNAL MEDICINE

## 2021-05-04 PROCEDURE — 770022 HCHG ROOM/CARE - ICU (200)

## 2021-05-04 PROCEDURE — 71045 X-RAY EXAM CHEST 1 VIEW: CPT

## 2021-05-04 PROCEDURE — 80048 BASIC METABOLIC PNL TOTAL CA: CPT

## 2021-05-04 PROCEDURE — 94799 UNLISTED PULMONARY SVC/PX: CPT

## 2021-05-04 PROCEDURE — 700105 HCHG RX REV CODE 258: Performed by: INTERNAL MEDICINE

## 2021-05-04 PROCEDURE — 700101 HCHG RX REV CODE 250: Performed by: INTERNAL MEDICINE

## 2021-05-04 PROCEDURE — 94150 VITAL CAPACITY TEST: CPT

## 2021-05-04 PROCEDURE — 99291 CRITICAL CARE FIRST HOUR: CPT | Performed by: INTERNAL MEDICINE

## 2021-05-04 RX ORDER — MAGNESIUM SULFATE HEPTAHYDRATE 40 MG/ML
2 INJECTION, SOLUTION INTRAVENOUS ONCE
Status: COMPLETED | OUTPATIENT
Start: 2021-05-04 | End: 2021-05-04

## 2021-05-04 RX ORDER — DEXMEDETOMIDINE HYDROCHLORIDE 4 UG/ML
.1-1.5 INJECTION, SOLUTION INTRAVENOUS CONTINUOUS
Status: DISCONTINUED | OUTPATIENT
Start: 2021-05-04 | End: 2021-05-11

## 2021-05-04 RX ADMIN — OXYCODONE HYDROCHLORIDE 15 MG: 10 TABLET ORAL at 21:44

## 2021-05-04 RX ADMIN — HYDROMORPHONE HYDROCHLORIDE 1 MG: 1 INJECTION, SOLUTION INTRAMUSCULAR; INTRAVENOUS; SUBCUTANEOUS at 02:28

## 2021-05-04 RX ADMIN — HYDROMORPHONE HYDROCHLORIDE 2 MG: 1 INJECTION, SOLUTION INTRAMUSCULAR; INTRAVENOUS; SUBCUTANEOUS at 10:57

## 2021-05-04 RX ADMIN — HYDROMORPHONE HYDROCHLORIDE 1 MG: 1 INJECTION, SOLUTION INTRAMUSCULAR; INTRAVENOUS; SUBCUTANEOUS at 08:26

## 2021-05-04 RX ADMIN — CEFEPIME 2 G: 2 INJECTION, POWDER, FOR SOLUTION INTRAVENOUS at 05:18

## 2021-05-04 RX ADMIN — PAROXETINE HYDROCHLORIDE 10 MG: 20 TABLET, FILM COATED ORAL at 05:19

## 2021-05-04 RX ADMIN — RISPERIDONE 2 MG: 1 TABLET, FILM COATED ORAL at 05:19

## 2021-05-04 RX ADMIN — FAMOTIDINE 20 MG: 20 TABLET ORAL at 05:19

## 2021-05-04 RX ADMIN — HYDROMORPHONE HYDROCHLORIDE 2 MG: 1 INJECTION, SOLUTION INTRAMUSCULAR; INTRAVENOUS; SUBCUTANEOUS at 20:25

## 2021-05-04 RX ADMIN — ENOXAPARIN SODIUM 40 MG: 40 INJECTION SUBCUTANEOUS at 05:18

## 2021-05-04 RX ADMIN — OXYCODONE HYDROCHLORIDE 15 MG: 10 TABLET ORAL at 05:19

## 2021-05-04 RX ADMIN — FUROSEMIDE 20 MG: 10 INJECTION, SOLUTION INTRAMUSCULAR; INTRAVENOUS at 05:20

## 2021-05-04 RX ADMIN — ACETAMINOPHEN 650 MG: 325 TABLET, FILM COATED ORAL at 20:25

## 2021-05-04 RX ADMIN — DIVALPROEX SODIUM 500 MG: 125 CAPSULE ORAL at 05:19

## 2021-05-04 RX ADMIN — CEFEPIME 2 G: 2 INJECTION, POWDER, FOR SOLUTION INTRAVENOUS at 17:48

## 2021-05-04 RX ADMIN — DEXMEDETOMIDINE 0.4 MCG/KG/HR: 200 INJECTION, SOLUTION INTRAVENOUS at 21:44

## 2021-05-04 RX ADMIN — OXYCODONE HYDROCHLORIDE 15 MG: 10 TABLET ORAL at 14:35

## 2021-05-04 RX ADMIN — DULOXETINE HYDROCHLORIDE 60 MG: 60 CAPSULE, DELAYED RELEASE ORAL at 05:19

## 2021-05-04 RX ADMIN — OXYCODONE HYDROCHLORIDE 15 MG: 10 TABLET ORAL at 01:08

## 2021-05-04 RX ADMIN — CARVEDILOL 6.25 MG: 6.25 TABLET, FILM COATED ORAL at 17:47

## 2021-05-04 RX ADMIN — FUROSEMIDE 20 MG: 10 INJECTION, SOLUTION INTRAMUSCULAR; INTRAVENOUS at 17:48

## 2021-05-04 RX ADMIN — DEXMEDETOMIDINE 0.2 MCG/KG/HR: 200 INJECTION, SOLUTION INTRAVENOUS at 11:43

## 2021-05-04 RX ADMIN — FAMOTIDINE 20 MG: 20 TABLET ORAL at 17:41

## 2021-05-04 RX ADMIN — ACETAMINOPHEN 650 MG: 325 TABLET, FILM COATED ORAL at 04:26

## 2021-05-04 RX ADMIN — ACETAMINOPHEN 650 MG: 325 TABLET, FILM COATED ORAL at 14:40

## 2021-05-04 RX ADMIN — MAGNESIUM SULFATE 2 G: 2 INJECTION INTRAVENOUS at 08:10

## 2021-05-04 RX ADMIN — DULOXETINE HYDROCHLORIDE 60 MG: 60 CAPSULE, DELAYED RELEASE ORAL at 17:41

## 2021-05-04 RX ADMIN — RISPERIDONE 2 MG: 1 TABLET, FILM COATED ORAL at 17:42

## 2021-05-04 RX ADMIN — POTASSIUM CHLORIDE 40 MEQ: 1500 TABLET, EXTENDED RELEASE ORAL at 05:19

## 2021-05-04 RX ADMIN — LABETALOL HYDROCHLORIDE 10 MG: 5 INJECTION, SOLUTION INTRAVENOUS at 02:26

## 2021-05-04 RX ADMIN — POTASSIUM BICARBONATE 25 MEQ: 978 TABLET, EFFERVESCENT ORAL at 17:41

## 2021-05-04 RX ADMIN — HYDROMORPHONE HYDROCHLORIDE 2 MG: 1 INJECTION, SOLUTION INTRAMUSCULAR; INTRAVENOUS; SUBCUTANEOUS at 23:06

## 2021-05-04 RX ADMIN — HYDROMORPHONE HYDROCHLORIDE 2 MG: 1 INJECTION, SOLUTION INTRAMUSCULAR; INTRAVENOUS; SUBCUTANEOUS at 16:48

## 2021-05-04 RX ADMIN — DIVALPROEX SODIUM 500 MG: 125 CAPSULE ORAL at 14:35

## 2021-05-04 RX ADMIN — DOCUSATE SODIUM 50 MG AND SENNOSIDES 8.6 MG 2 TABLET: 8.6; 5 TABLET, FILM COATED ORAL at 05:19

## 2021-05-04 RX ADMIN — DIVALPROEX SODIUM 500 MG: 125 CAPSULE ORAL at 21:44

## 2021-05-04 RX ADMIN — OXYCODONE HYDROCHLORIDE 15 MG: 10 TABLET ORAL at 18:30

## 2021-05-04 ASSESSMENT — PULMONARY FUNCTION TESTS: FVC: 0

## 2021-05-04 NOTE — CARE PLAN
Ventilator Daily Summary    Vent Day #  V19 T4  Ventilator settings changed this shift:  no  Weaning trials:  Yes   Respiratory Procedures:  no  Plan: Continue current ventilator settings and wean mechanical ventilation as tolerated per physician orders.   Spont 5/8 30  24 300 8 30

## 2021-05-04 NOTE — CARE PLAN
Problem: Pain Management  Goal: Pain level will decrease to patient's comfort goal  Outcome: PROGRESSING AS EXPECTED     Problem: Safety  Goal: Will remain free from injury  Outcome: MET     Problem: Safety - Medical Restraint  Goal: Free from restraint(s) (Restraint for Interference with Medical Device)  Description: INTERVENTIONS:  1. ONCE/SHIFT or MINIMUM Q12H: Assess and document the continuing need for restraints  2. Q24H: Continued use of restraint requires LIP to perform face to face examination and written order  3. Identify and implement measures to help patient regain control  Outcome: MET     Problem: Mechanical Ventilation  Goal: Safe management of artificial airway and ventilation  Outcome: MET

## 2021-05-04 NOTE — PROGRESS NOTES
"Critical Care Progress Note    Date of admission  4/16/2021    Chief Complaint  50 y.o. female the past medical history of pseudotumor cerebri status post  shunt by Dr. Oh, chronic pain with history of placement of nerve stimulator, vasculitis, right anterior chest port for home IV meds with recurrent infection who presented 4/11/2021 with to [facility] with recurrent port infection and was initially treated with vancomycin and Zosyn and then changed to ceftaroline and subsequently switched to nafcillin when MSSA was identified.    Hospital Course  \"50 y.o. female the past medical history of pseudotumor cerebri status post  shunt by Dr. Oh, chronic pain with history of placement of nerve stimulator, vasculitis, right anterior chest port for home IV meds with recurrent infection who presented 4/11/2021 with to [facility] with recurrent port infection. Found to have MSSA bacteremia, endocarditis, septic pulmonary emboli/empyema/pneumatocele, and CSF pleocytosis concerning for  shunt involvement.  Seen by Dr. Oh, NSGY, at [facility] who did not recommend  shunt removal.  Seen by Dr. Vargas here for concern of fluid collection around her spinal stimulator and he recommended against removal. Remains intubated, encephalopathic.    4/16 admitted to ICU  4/17 VD 2, 2 FFP, pRBC overnight; new chest tube per gen surg  4/18 VD 3, TPa/Dornase with 1400 cc from R chest tube    4/26: R chest tube not functioning, d/c it.  broke his leg and going to OR today, so not available currently.  VD 11.  8/30%. RSBI immediately high on SBT attempt. Followed commands for RN  4/27: VD12. 8/30%. Not tolerating wean, high RSBI and tachycardic.   4/28: VD 13. VATS with Dr. Ganser.    4/29: VD 14. 8/30%. Still not tolerating wean d/t severe tachycardia just with SAT. Trialed precedex and she required fentanyl up to 600/hr to tolerate, so back on propofol. Chest tubes -40  4/30: VD 15. 8/30%. Try ketamine/versed as sedative. Severe " tachycardia and HTN with weaning down propofol. Plan for perc trach tomorrow. Chest tubes -40  5/1: perc trach. PICC placed. Chest tubes remain -40.  5/2: Able to spont well on sedation, but very tachy/HTN when sedation is turned down.      Interval Problem Update  Doing well weaning down to versed gtt to 3mg/min yesterday  RASS -1 to -2.   No acute events overnight.   Remains critically ill.   Versed and ketamine gtt were discontinued today. RASS +1  On trach, on 30%/PEEP 8  Two right chest tubes to suction -40 per surgery  Pt continues to complain of pain  Pt is on oxycodone 15 Q4H  and dilaudid PRN.   Appears weak  Tolerating tube feed, cortrack clogged today.   Good UOP, negative 2.2L in the past 24 hours, negative 3L since admission.       Review of Systems  Review of Systems   Unable to perform ROS: Intubated        Vital Signs for last 24 hours   Temp:  [37.5 °C (99.5 °F)-38.6 °C (101.5 °F)] 37.6 °C (99.7 °F)  Pulse:  [] 116  Resp:  [14-34] 19  BP: ()/(51-90) 157/72  SpO2:  [95 %-100 %] 97 %    Hemodynamic parameters for last 24 hours       Respiratory Information for the last 24 hours  Vent Mode: Spont  Rate (breaths/min): 24  Vt Target (mL): 300  PEEP/CPAP: 8  P Support: 10  MAP: 13  Length of Weaning Trial (Hours): ongoing approx 2AM 5/3   Control VTE (exp VT): 310    Physical Exam   Physical Exam  Vitals and nursing note reviewed.   Constitutional:       Appearance: She is ill-appearing.      Interventions: She is sedated and intubated.   HENT:      Mouth/Throat:      Mouth: Mucous membranes are moist.   Neck:      Comments: Trach in place  8.0 Portex  Cardiovascular:      Rate and Rhythm: Regular rhythm. Tachycardia present.      Heart sounds: Murmur present.   Pulmonary:      Effort: She is intubated.      Comments: Improving respiratory movement of R chest    R chest tubes. No air leaks  Abdominal:      General: Bowel sounds are normal. There is no distension.      Tenderness: There is no  abdominal tenderness. There is no guarding.   Musculoskeletal:      Right lower leg: Edema present.      Left lower leg: Edema present.      Comments: Generalized anasarca   Skin:     General: Skin is warm and dry.      Capillary Refill: Capillary refill takes 2 to 3 seconds.   Psychiatric:      Comments: Unable to assess         Medications  Current Facility-Administered Medications   Medication Dose Route Frequency Provider Last Rate Last Admin   • potassium bicarbonate (KLYTE) effervescent tablet 25 mEq  25 mEq Enteral Tube Once Jonathan Upton D.O.       • magnesium sulfate IVPB premix 2 g  2 g Intravenous Once Jonathan Upton D.O.       • oxyCODONE immediate-release (ROXICODONE) tablet 15 mg  15 mg Enteral Tube Q4HRS Jonathan Upton D.O.   15 mg at 05/04/21 0519   • midazolam (VERSED) premix 125 mg/125 mL NS  3 mg/hr Intravenous Continuous Jonathan Upton D.O. 3 mL/hr at 05/03/21 1607 3 mg/hr at 05/03/21 1607   • cefepime (MAXIPIME) 2 g in  mL IVPB  2 g Intravenous Q12HRS Jonathan Upton, D.O.   Stopped at 05/04/21 0548   • HYDROmorphone (Dilaudid) injection 1-2 mg  1-2 mg Intravenous Q2HRS PRN Jeremy M Gonda, M.D.   1 mg at 05/04/21 0228   • furosemide (LASIX) injection 20 mg  20 mg Intravenous BID DIURETIC Leti Sun M.D.   20 mg at 05/04/21 0520   • carvedilol (COREG) tablet 6.25 mg  6.25 mg Enteral Tube BID WITH MEALS Leti Sun M.D.   6.25 mg at 05/03/21 1808   • potassium chloride SA (Kdur) tablet 40 mEq  40 mEq Enteral Tube DAILY Leti Sun M.D.   40 mEq at 05/04/21 0519   • midazolam (Versed) 2 MG/2ML injection 2 mg  2 mg Intravenous Q HOUR PRN Leti Sun M.D.   2 mg at 05/03/21 2045   • ketamine 1,000 mg in  mL infusion (critical care only)  0-2.5 mg/kg/hr Intravenous Continuous Leti Sun M.D. 20.7 mL/hr at 05/04/21 0701 0.5 mg/kg/hr at 05/04/21 0701   • labetalol (NORMODYNE/TRANDATE) injection 10 mg  10 mg Intravenous Q4HRS PRN Leti Sun M.D.   10 mg at 05/04/21 0226   •  enoxaparin (LOVENOX) inj 40 mg  40 mg Subcutaneous DAILY Benoit Loya Jr., D.O.   40 mg at 05/04/21 0518   • Pharmacy Consult: Enteral tube insertion - review meds/change route/product selection  1 Each Other PHARMACY TO DOSE Elliott Salvador M.D.       • acetaminophen (Tylenol) tablet 650 mg  650 mg Enteral Tube Q4HRS PRN Elliott Salvador M.D.   650 mg at 05/04/21 0426   • magnesium hydroxide (MILK OF MAGNESIA) suspension 30 mL  30 mL Enteral Tube QDAY PRN Elliott Salvador M.D.        And   • senna-docusate (PERICOLACE or SENOKOT S) 8.6-50 MG per tablet 2 tablet  2 tablet Enteral Tube BID Elliott Salvador M.D.   2 tablet at 05/04/21 0519    And   • polyethylene glycol/lytes (MIRALAX) PACKET 1 Packet  1 Packet Enteral Tube QDAY PRN Elliott Salvador M.D.        And   • bisacodyl (DULCOLAX) suppository 10 mg  10 mg Rectal QDAY PRN Elliott Salvador M.D.       • divalproex (DEPAKOTE SPRINKLE) capsule 500 mg  500 mg Enteral Tube Q8HRS Elliott Salvador M.D.   500 mg at 05/04/21 0519   • DULoxetine (CYMBALTA) capsule 60 mg  60 mg Enteral Tube BID Elliott Salvador M.D.   60 mg at 05/04/21 0519   • PARoxetine (PAXIL) tablet 10 mg  10 mg Enteral Tube QAM Elliott Salvador M.D.   10 mg at 05/04/21 0519   • risperiDONE (RISPERDAL) tablet 2 mg  2 mg Enteral Tube BID Elliott Salvador M.D.   2 mg at 05/04/21 0519   • Respiratory Therapy Consult   Nebulization Continuous RT Man Wahl M.D.       • famotidine (PEPCID) tablet 20 mg  20 mg Enteral Tube Q12HRS Man Wahl M.D.   20 mg at 05/04/21 0519   • MD Alert...ICU Electrolyte Replacement per Pharmacy   Other PHARMACY TO DOSE Man Wahl M.D.       • lidocaine (XYLOCAINE) 1 % injection 1-2 mL  1-2 mL Tracheal Tube Q30 MIN PRN Man Wahl M.D.       • ipratropium-albuterol (DUONEB) nebulizer solution  3 mL Nebulization Q2HRS PRN (RT) Man Wahl M.D.           Fluids    Intake/Output Summary (Last 24 hours) at 5/4/2021 0704  Last data  filed at 5/4/2021 0425  Gross per 24 hour   Intake 963.09 ml   Output 3390 ml   Net -2426.91 ml       Laboratory          Recent Labs     05/02/21 0510 05/03/21 0520 05/04/21 0455   SODIUM 136 128* 136   POTASSIUM 3.2* 3.5* 3.7   CHLORIDE 104 101 104   CO2 23 22 24   BUN 12 15 17   CREATININE <0.17* <0.17* <0.17*   MAGNESIUM 1.7 1.6 1.7   PHOSPHORUS 3.1 2.8 3.0   CALCIUM 8.0* 8.1* 8.2*     Recent Labs     05/02/21 0510 05/03/21 0520 05/03/21 2055 05/04/21 0455   ALTSGPT  --  30  --   --    ASTSGOT  --  29  --   --    ALKPHOSPHAT  --  374*  --   --    TBILIRUBIN  --  0.7  --   --    PREALBUMIN  --   --  16.9*  --    GLUCOSE 117* 93  --  108*     Recent Labs     05/02/21 0510 05/03/21 0520 05/04/21 0455   WBC 17.0* 18.0* 21.5*   NEUTSPOLYS 80.70* 78.90* 84.60*   LYMPHOCYTES 6.10* 8.00* 5.60*   MONOCYTES 8.70 8.40 6.60   EOSINOPHILS 0.00 0.00 0.00   BASOPHILS 0.60 0.70 0.30   ASTSGOT  --  29  --    ALTSGPT  --  30  --    ALKPHOSPHAT  --  374*  --    TBILIRUBIN  --  0.7  --      Recent Labs     05/02/21 0510 05/03/21 0520 05/04/21 0455   RBC 2.22* 2.57* 2.73*   HEMOGLOBIN 6.6* 7.8* 8.2*   HEMATOCRIT 21.0* 24.2* 26.3*   PLATELETCT 403 383 370       Imaging  X-Ray:  I have personally reviewed the images and compared with prior images.    Assessment/Plan  * Bacteremia due to methicillin susceptible Staphylococcus aureus (MSSA)- (present on admission)  Assessment & Plan  Source presumed right anterior chest line/port with endocarditis  Multiple sources for MSSA including MV/TV vegetation, port, spinal stimulator/ shunt  Port has been removed  Last positive blood cultures were 4/17  Pt appeared to be on nafcillin from 4/16-4/23. Pt did receive MSSA therapy when she was in [facility].   Would touch base with ID regarding optimal therapy of MSSA bacteremia.           Agitation requiring sedation protocol  Assessment & Plan  Patient was on propofol/fentanyl for approximately 2 weeks  When sedation is weaned, she  becomes extremely tachycardic and hypotensive  When fentanyl gtt was d/c'ed (even with oxycodone 10mg q4) her pupils became quite dilated, suggesting a component of opiate withdrawal  Pt is on oxycodone 15 Q4H and dilaudid prn.   Off versed and ketamine gtt today 5/4      Trapped lung  Assessment & Plan  Decortication 4/28  Continue chest tubes to suction and hope lungs expand  R chest is expanding somewhat.  There is now visible respiratory movement in the R chest and some improved expansion on CXR    Empyema of right pleural space (HCC)- (present on admission)  Assessment & Plan  R empyema due to septic pulmonary emboli  4/16 Chest tube placement at [facility]  4/18 Upsized by Dr Benedict  4/18-4/20 Received TPA/dornase.  Stopped b/c Hgb dropped requiring transfusion  4/26 was discontinued due to stopped functioning   4/28 s/p right VATS and decortication. Cx grew of Klebsiella pneumonia  May need another intervention in future, per Dr. Ganser  Has two chest tubes on the right and connect to -40 per surgery        Acute bacterial endocarditis- (present on admission)  Assessment & Plan  MSSA bacteremia at OSH and here on 4/16. First negative blood cultures were on 4/17  Infected port removed at [facility]   shunt and spinal stimulator could be seeded, NSGY has consulted on both and don't recommend removal at admission due to HD instability.   MV and TV vegetations with septic pulmonary emboli  Nataly ID Dr Omer was following.   K. Pneumonia empyema - on cefepime  Deemed not surgical candidate - No evidence based surgical indications (valvular CHF, difficult pathogen, etc) so no cardiovascular surgery consult is necessary at this time per prior note    Plan:   On cefepime for K. pneumonia  Received nafcilin from 4/16-4/22.   ID following        Acute respiratory failure with hypoxia (HCC)- (present on admission)  Assessment & Plan  4/15 Intubated for acute hypoxic respiratory failure at [facility] due to hydro/pneumo, trapped lung,  lung abscesses  4/16 transferred to [facility]   4/16-4/28 Difficult to wean due to ongoing right loculated hydropneumothorax and lung abscesses, empyema. S/p 2 chest tubes  4/28 S/p right lung decortication by Dr. Ganser  5/1 s/p percutaneous tracheostomy    Concern of difficult to wean off sedation given prolonged sedation with prolonged intubation.   Pt was on versed gtt, scheduled oxycodone and dilaudid IV prn  4/30 ketamine gtt was started and planning to bridge and treat pain while weaning off versed    Plan:   Daily SAT, will turn off versed gtt and ketamine gtt.   Doing well on SBT today, keep on spontaneous mode  RASS 0 to -1  On oxycodone scheduled with dilaudid prn  Mobility level 1 at least, should attempt level 2 today  Monitor UOP  Continue diuresis with lasix 20 Q12H. Goal even to minimally negative fluid balance.   Tolerating tube feed at goal  Will need PEG tube placement. Consulted GI, Dr. Moseley.       Acute encephalopathy- (present on admission)  Assessment & Plan  Likely multifactorial given CSF pleocytosis concerning of likely seeded spinal stimulator/ shunt, sedation  Pt has hx of  shunt for pseudotumor cerebri  Concern of autoimmune cerebritis was entertained but this was not confirmed.   Not candidate for MRI d/t spinal stimulator  EEG with encephalopathy and no seizures  Minimize sedation as able  Overall improving      Pleural effusion, left  Assessment & Plan  Loculated fluid  Probably parapneumonic fluid from abscesses  CT guided chest tube placed 4/23, fluid sent  Lymph predominant, NGTD  TPA/dornase one dose on 4/24.  Hold d/t dropping Hgb  CT chest 4/25 with resolution of fluid  CT clamped 4/29-no significant accumulation of fluid so d/c 4/30    Fever  Assessment & Plan  Stable fever curve  R pleural space has trapped lung so impossible to completely clear/get source control      Pneumonia  Assessment & Plan  MSSA septic emboli with empyema   VAE  BAL 4/20 klebsiella  pneumonia  BAL 4/24 still heavy growth of klebsiella  4/28 OR pleural fluid specimen growing klebsiella.   On cefepime    CSF pleocytosis- (present on admission)  Assessment & Plan  Status post lumbar puncture 4/13  WBC 53, 82% polys, Glucose 47, protein 97 Gram stain negative and culture negative at [facility]  Neurosurgery aware,  shunt in place  MRI brain requested by neurosurgery at [facility], not candidate d/t stimulator  Repeat ct no embolic lesions      Acute blood loss anemia  Assessment & Plan  Hgb dropped again after TPA/dornase on 4/25  No clear source of bleed  D/c TPA/dornase  Hgb stable  Transfuse >7    S/P  shunt- (present on admission)  Assessment & Plan  History of pseudotumor cerebri  History of  shunt by Dr. Oh  Could be seeded by MSSA, Dr. Oh consulted at [facility], recommended MRI but unable given her spinal stimulator      Goals of care, counseling/discussion  Assessment & Plan  Palliative is consulting  Dr. Sun spoke with Benoit on 4/27. I explained my concern that even if Enedelia were to survive this her QOL would likely be quite a bit worse.  She won't be able to have another port and was receiving IV meds for HA 3-4 times weekly.  She also could have worse headaches after this episode of meningitis.  He asked if she could have brain damage, and I explained that was possible but hard to know right now.  He says part of him thinks Enedelia would want to keep fighting and part of him thinks she would say to stop aggressive care because her QOL was already very limited by her headaches. Continue aggressive care for now.    5/4 spoke with  and updated him about patient's critical condition. All questions were answered.     Spinal cord stimulator status  Assessment & Plan  Patient's stimulator is Nuvectra. It is FDA approved as MRI compatible, but the company has gone out of business, so there is no support team to assist with MRI.  Thus, if MRI were performed, our radiologists would need to  protocol it correctly to manage the stimulator. The former rep from [employer] is Andre, [phone].  Information obtained from Dr. Mahoney's office, [phone].    Per  (5/4/2021), it is not MRI compatible unfortunately.     Pulmonary abscess (HCC)  Assessment & Plan  Septic emboli from TV endocarditis    Septic shock (HCC)- (present on admission)  Assessment & Plan  Shock resolved    Abnormal LFTs  Assessment & Plan  Chronic alk phos elevation  GGT high c/w liver source  Outpatient workup    Chronic pain syndrome- (present on admission)  Assessment & Plan  Chronic back pain and intractable headaches  History of nerve stimulator   Dr Vargas consulted, no plan to remove stimulator due to her clinical instability     VTE:  lovenox  Ulcer: H2 Antagonist  Lines: Central Line  Ongoing indication addressed and Lopez Catheter  Ongoing indication addressed   Two chest tubes on the right    I have performed a physical exam and reviewed and updated ROS and Plan today (5/4/2021). In review of yesterday's note (5/3/2021), there are no changes except as documented above.       Discussed patient condition and risk of morbidity and/or mortality with Family, RN, RT, Pharmacy, Dietary, Code status disscussed and Charge nurse / hot rounds.      The patient remains critically ill requiring mechanical ventilation, difficulty weaning from vent, complicated pleural space.  Critical care time = 55 minutes in directly providing and coordinating critical care and extensive data review.  No time overlap and excludes procedures

## 2021-05-04 NOTE — PROGRESS NOTES
Infectious Disease Progress Note    Author: Eric Lowery M.D. Date & Time created: 5/4/2021  2:28 PM     Cefepime 4/23 -  current    Thoracoscopy & Decortication - 4/28     PRIOR Rx:   Zosyn 4/22-4/23 S/P Thoracoscopy & Decortication Day #4  Previous: Unasyn, Zosyn, Vanco, Daptomycin  Ceftaroline: start 4/13-4/15  Nafcillin 2g Q4 hrs 4/15-4/23 4/14: Unable to give name of previous NSG or neurologist. Seems confused, but able to say some sentences fluently.   4/15: Line cultures now growing MSSA. As well as from the blood. Repeat blood culture 4/13+ in both sets. Patient has worsening confusion. CSF fluid analyses suggest pleocytosis. Cultures are no growth from the CSF. Chest CT was ordered this morning indicating a loculated right pleural effusion as well as bilateral septic emboli. Dr. Mack spoke with Dr. Oh yesterday and no surgical intervention unless clinical deterioration.  4/16: Increased respiratory distress.  Tachypneic.  CT with loculated collection.  Dr Resendiz not available.  No thoracic surgeon available.  Plans to have pulmonary place CT.  Transferring to ICU.  4/17: Transferred to [facility] last night. Hgb dropped to 6.4 requiring PRBC transfusion. Requiring 2 pressors. Fio2 50%. Febrile 38.8. Pending OR decortication of empyema and hemothorax. Chest tube placed at [facility] shows 8,371 WBC, ,000, 74% N  4/18: Chest tube was upsized by surgery yesterday, no decortication. WBC 22k. Fio2 40%. Vasopressin. Levophed down to 2mcg. Afebrile 24 hrs. Last fever 38.6 on 4/16.   4/19: Still on vent. Levo 3 mcg, vasopressin. Afebrile 72 hours. WBC 21k. BC 4/17 NGTD x 48 hours. Unable to do MRI brain given neurostimulator per RN.   4/20: Remaining afebrile. Hb 6.2 and undergoing transfusion today.WBC 24,100, 5% bands.1700 ml CT output. Copious bloody ET secretions. No pressors. Receiving dornase and TPA per CT. Right pleural effusion not large per CXR today.   4/21: WBC 31k. Bronchoscopy yesterday  showing blood. Cultures sent. TPA on hold per RN due to arvind blood from chest tube requiring PRBC transfusion for hgb 6. Pending chest CT today. Per , spinal stimulator is non-MRI compatible. Levophed 10mcg. 30% Fio2. Afebrile.   4/22: Fever 101.3 this am. WBC 20,300 down from 32,200 yesterday. BAL growing Klebsiella pneumoniae but susceptibility panel not set up until today. CTA of brain shows no lesion. CT of chest shows enlarging cavitary lung lesions bilat and large loculated new left pleural effusion and large hydropneumothorax on right. TPA & dornase infusions of right chest ended 4/20 after considerable bleeding requiring transfusion. Hb now down again to 6.8.  4/23: WBC 23k. Fio2 40%. Tmax 38.1. US of stiumlator shows small fluid collection but no drainable abscess. Pending MICAH today. Pending L chest tube placement  4/24: Continue fever 100.8-101.8. WBC 16,600. MICAH shows large vegetations on both tricuspid valve and mitral valve with mild TR & MR.  No aortic root abscess. Left chest tube placed yesterday yielding 8 ml of old bloody fluid. Bronch today revealed copious thick maroon secretions in distal trachea and both mainstem bronchi. On the right these were obstructive. Remaining off pressors. Last positive blood cultures (MSSA) were 4/16, negative 4/17.  4/25: Fever 100.6 this AM. wBC 13,300. Hb 6.6. CT: right hydropneumothorax increasing, right bronchopleural fistula, mediastinal shift ot the left , multiple cavitary pulmonary nodules, 3.1 cm left basilar mass, splenomegaly. CT of head shows dural thickening over the clivus. Lac+ GNR >100,000 col/ml growing from BAL of 4/24, presumed Klebsiella. Left peural fluid culture negative from 4/23.  4/26: Fever 100.4 last night. WBC 13,500. Hb 7.4. Plts 502,000. ESR >120. UA fairly clear except 30 mg% protein, 2-5 wbc and rare rbc. CXR unchanged except more prominent pulmonary edema. GNR in BAL may be a different species of Klebsiella, and resistant to  ampicillin & tmp-sulfa; confirmation pending.  4/28: Fever 100.8 last night. WBC 27,700. Hb 6.6. Plts 482,000. Creat 0.19. Kleb pneum in BAL on 4/24 is resistant to ampicillin & tmp-sulfa but otherwise sensitive. O2 sat 96% on FIO2 30% now, PEEP 8. Has been re-evaluated by thoracic surgeon, Dr. Ganser, who believes she will not wean without decortication on the right. Surgery anticipated today.  4/29: S/P right thoracoscopy with decortication with cultures NG at 24 hrs.  4/30: Had a bronch due to hypoxia and hypotension. CXR with worsening right hemithorax pulmonary opacities. Bloody mucous plugs removed. Pressors started. Febrile this am. Tachy in 130s. Pressors just removed. Tolerating vent, but not a SBT candidate at the moment.  5/1: Small air leak CT-2 every ~ 2/3 breathes. The K. pneumoniae from her thoracoscopy is sensitive to her cefepime so no antibiotic change needed.   5/2: No air leak today she tolerated 2  hrs of spontaneous breathing with support today.      5/3: Second day with SBT and doing well now for 2 hrs. Slight bump in temperature and      WBC's but no obvious clinical infectious changed so watch closely.  5/4: Fever still from time to time. WBCs trending up. Chest tubes in place. Trach.     Interval History:  Past 24 hrs reviewed with RN.    Labs Reviewed, Medications Reviewed, Radiology Reviewed, Wound Reviewed, Fluids Reviewed and GI Nutrition Reviewed    Review of Systems:  Review of Systems   Unable to perform ROS: Intubated       Physical Exam:  Physical Exam  Vitals and nursing note reviewed.   Constitutional:       General: She is not in acute distress.  HENT:      Head: Normocephalic and atraumatic.   Cardiovascular:      Rate and Rhythm: Tachycardia present.      Heart sounds: No murmur. No gallop.    Pulmonary:      Effort: Pulmonary effort is normal. No respiratory distress.      Breath sounds: No wheezing or rhonchi.   Abdominal:      General: Abdomen is flat. There is no  distension.      Palpations: Abdomen is soft.      Tenderness: There is no abdominal tenderness. There is no guarding.   Skin:     General: Skin is warm and dry.   Neurological:      Comments: Sedated grimaces to pain.         Labs:  Recent Results (from the past 24 hour(s))   CRP Quantitive (Non-Cardiac)    Collection Time: 05/03/21  7:20 PM   Result Value Ref Range    Stat C-Reactive Protein 12.74 (H) 0.00 - 0.75 mg/dL   Prealbumin    Collection Time: 05/03/21  8:55 PM   Result Value Ref Range    Pre-Albumin 16.9 (L) 18.0 - 38.0 mg/dL   CBC with Differential    Collection Time: 05/04/21  4:55 AM   Result Value Ref Range    WBC 21.5 (H) 4.8 - 10.8 K/uL    RBC 2.73 (L) 4.20 - 5.40 M/uL    Hemoglobin 8.2 (L) 12.0 - 16.0 g/dL    Hematocrit 26.3 (L) 37.0 - 47.0 %    MCV 96.3 81.4 - 97.8 fL    MCH 30.0 27.0 - 33.0 pg    MCHC 31.2 (L) 33.6 - 35.0 g/dL    RDW 59.0 (H) 35.9 - 50.0 fL    Platelet Count 370 164 - 446 K/uL    MPV 9.1 9.0 - 12.9 fL    Neutrophils-Polys 84.60 (H) 44.00 - 72.00 %    Lymphocytes 5.60 (L) 22.00 - 41.00 %    Monocytes 6.60 0.00 - 13.40 %    Eosinophils 0.00 0.00 - 6.90 %    Basophils 0.30 0.00 - 1.80 %    Immature Granulocytes 2.90 (H) 0.00 - 0.90 %    Nucleated RBC 0.00 /100 WBC    Neutrophils (Absolute) 18.14 (H) 2.00 - 7.15 K/uL    Lymphs (Absolute) 1.21 1.00 - 4.80 K/uL    Monos (Absolute) 1.42 (H) 0.00 - 0.85 K/uL    Eos (Absolute) 0.00 0.00 - 0.51 K/uL    Baso (Absolute) 0.07 0.00 - 0.12 K/uL    Immature Granulocytes (abs) 0.63 (H) 0.00 - 0.11 K/uL    NRBC (Absolute) 0.00 K/uL   Basic Metabolic Panel (BMP)    Collection Time: 05/04/21  4:55 AM   Result Value Ref Range    Sodium 136 135 - 145 mmol/L    Potassium 3.7 3.6 - 5.5 mmol/L    Chloride 104 96 - 112 mmol/L    Co2 24 20 - 33 mmol/L    Glucose 108 (H) 65 - 99 mg/dL    Bun 17 8 - 22 mg/dL    Creatinine <0.17 (L) 0.50 - 1.40 mg/dL    Calcium 8.2 (L) 8.5 - 10.5 mg/dL    Anion Gap 8.0 7.0 - 16.0   MAGNESIUM    Collection Time: 05/04/21  4:55  "AM   Result Value Ref Range    Magnesium 1.7 1.5 - 2.5 mg/dL   PHOSPHORUS    Collection Time: 05/04/21  4:55 AM   Result Value Ref Range    Phosphorus 3.0 2.5 - 4.5 mg/dL   ESTIMATED GFR    Collection Time: 05/04/21  4:55 AM   Result Value Ref Range    GFR If African American >60 >60 mL/min/1.73 m 2    GFR If Non African American >60 >60 mL/min/1.73 m 2   PROCALCITONIN    Collection Time: 05/04/21  8:20 AM   Result Value Ref Range    Procalcitonin 0.13 <0.25 ng/mL     Results     Procedure Component Value Units Date/Time    BLOOD CULTURE [[id number]] Collected: 05/04/21 1200    Order Status: Completed Specimen: Blood from Peripheral Updated: 05/04/21 1240    Narrative:      Collected By:[id number] FERNANDA WISE  Per Hospital Policy: Only change Specimen Src: to \"Line\" if  specified by physician order.    BLOOD CULTURE [[id number]] Collected: 05/04/21 1150    Order Status: Completed Specimen: Blood from Peripheral Updated: 05/04/21 1237    Narrative:      Collected By:[id number] FERNANDA WISE  Per Hospital Policy: Only change Specimen Src: to \"Line\" if  specified by physician order.    CULTURE TISSUE W/ GRM STAIN [[id number]]  (Abnormal)  (Susceptibility) Collected: 04/28/21 1711    Order Status: Completed Specimen: Tissue Updated: 05/01/21 1241     Significant Indicator POS     Source TISS     Site Right Pleural Debris     Culture Result -     Gram Stain Result Rare WBCs.  No organisms seen.       Culture Result Klebsiella pneumoniae  Light growth      Narrative:      Surgery Specimen    Susceptibility     Klebsiella pneumoniae (1)     Antibiotic Interpretation Microscan Method Status    Ceftriaxone Sensitive <=1 mcg/mL AUGUSTINA Final    Cefazolin Sensitive <=2 mcg/mL AUGUSTINA Final    Ciprofloxacin Sensitive <=0.25 mcg/mL AUGUSTINA Final    Cefepime Sensitive <=2 mcg/mL AUGUSTINA Final    Cefuroxime Sensitive <=4 mcg/mL AUGUSTINA Final    Ampicillin/sulbactam Sensitive 8/4 mcg/mL AUGUSTINA Final    Ertapenem Sensitive <=0.5 mcg/mL AUGUSTINA Final    " "Tobramycin Sensitive <=2 mcg/mL AUGUSTINA Final    Gentamicin Sensitive <=2 mcg/mL AUGUSTINA Final    Moxifloxacin Sensitive <=2 mcg/mL AUGUSTINA Final    Pip/Tazobactam Sensitive <=8 mcg/mL AUGUSTINA Final    Trimeth/Sulfa Resistant >2/38 mcg/mL AUGUSTINA Final    Tigecycline Sensitive <=2 mcg/mL AUGUSTINA Final                   Anaerobic Culture [[id number]] Collected: 04/28/21 1711    Order Status: Completed Specimen: Tissue Updated: 05/01/21 1241     Significant Indicator NEG     Source TISS     Site Right Pleural Debris     Culture Result No Anaerobes isolated.    Narrative:      Surgery Specimen    BLOOD CULTURE [[id number]] Collected: 04/25/21 1248    Order Status: Completed Specimen: Blood from Peripheral Updated: 04/30/21 1500     Significant Indicator NEG     Source BLD     Site PERIPHERAL     Culture Result No growth after 5 days of incubation.    Narrative:      Collected By:[id number] JAELYN TORRES  Per Hospital Policy: Only change Specimen Src: to \"Line\" if  specified by physician order.  Right Forearm/Arm    BLOOD CULTURE [[id number]] Collected: 04/25/21 1248    Order Status: Completed Specimen: Blood from Peripheral Updated: 04/30/21 1500     Significant Indicator NEG     Source BLD     Site PERIPHERAL     Culture Result No growth after 5 days of incubation.    Narrative:      Collected By:[id number] JAELYN TORRES  Per Hospital Policy: Only change Specimen Src: to \"Line\" if  specified by physician order.  Left Forearm/Arm    GRAM STAIN [[id number]] Collected: 04/28/21 1711    Order Status: Completed Specimen: Tissue Updated: 04/29/21 0011     Significant Indicator .     Source TISS     Site Right Pleural Debris     Gram Stain Result Rare WBCs.  No organisms seen.      Narrative:      Surgery Specimen    SARS-CoV-2 PCR (24 hour In-House): Collect NP swab in [facility] [[id number]] Collected: 04/27/21 2215    Order Status: Completed Specimen: Respirate from Nasal Updated: 04/28/21 1033     SARS-CoV-2 Source NP Swab     SARS-CoV-2 by PCR " "NotDetected     Comment: PATIENTS: Important information regarding your results and instructions can  be found at [email or web address]   \"After your  Covid-19 Test\"  [facility] providers: PLEASE REFER TO DE-ESCALATION AND RETESTING PROTOCOL  on [email or web address]  **The TaqPath COVID-19 SARS-CoV-2 RT-PCR test has been made available for  use under the Emergency Use Authorization (EUA) only.         Narrative:      Collected By:[id number] ANNE SANCHEZ  Have you been in close contact with a person who is suspected  or known to be positive for COVID-19 within the last 30 days  (e.g. last seen that person < 30 days ago)->Unknown        Hemodynamics:  Temp (24hrs), Av.6 °C (99.7 °F), Min:37.6 °C (99.7 °F), Max:37.6 °C (99.7 °F)  Temperature: 37.6 °C (99.7 °F), Monitored Temp: 37.5 °C (99.5 °F)  Pulse  Av.4  Min: 40  Max: 149   Blood Pressure: 157/72, Arterial BP: (!) 89/52    Arterial Line 21 Radial (Active)   Site Assessment Clean;Dry;Intact 21   Line Status Pulsatile blood flow 21   Art Line Waveform Appropriate;Square wave test performed 21   Line Care Flushed per protocol;Leveled;Zeroed 21   Color/Movement/Sensation Capillary refill less than 3 sec;Pale fingers/toes;Cool fingers/toes 21   Perfusion Check Left 21   Dressing Type Transparent 21   Dressing Status Clean;Dry;Intact 21   Dressing Intervention N/A 21 0800   Dressing Change Due 21   Date Primary Tubing Changed 21 0800   Date IV Connector(s) Changed 21 0800   NEXT Primary Tubing Change  21 08   Cuff Pressure Correlates Yes 21       PICC Single Lumen 21 Right Brachial (Active)   Site Assessment Clean;Dry;Intact 21   Line Status Blood return noted;Flushed;Scrubbed the hub prior to access;Saline locked 21   Line Secured at (cm) 0 cm " 05/01/21 1507   Extremity Circumference (cm) 33 cm 05/01/21 1507   Dressing Type Biopatch;Occlusive;Transparent Film 05/03/21 2000   Dressing Status Clean;Dry;Intact 05/03/21 2000   Dressing Intervention N/A 05/03/21 0800   Dressing Change Due 05/08/21 05/03/21 2000   Date IV Connector(s) Changed 05/01/21 05/03/21 0800   NEXT Primary Tubing Change  05/04/21 05/03/21 0800   NEXT Secondary Tubing Change  05/03/21 05/02/21 0800   NEXT IV Connector(s) Change 05/04/21 05/03/21 0800   Line Necessity Assessed Antibiotic Therapy Greater than 7 Days 05/03/21 0800       CVC Triple Lumen 04/21/21 Left Subclavian (Active)   Consider Removal of Femoral Line Not Applicable 05/03/21 0800   Site Assessment Clean;Dry;Intact 05/03/21 2000   Lumen 1 Status Blood return noted;Flushed;Normal saline locked;Scrubbed the hub prior to access 05/03/21 2000   Lumen 3 Status Infusing 05/03/21 2000   Lumen 2 Status Infusing 05/03/21 2000   Dressing Type Biopatch;Occlusive;Transparent Film 05/03/21 2000   Dressing Status Clean;Dry;Intact 05/03/21 2000   Dressing Intervention N/A 05/03/21 0800   Dressing Change Due 05/08/21 05/03/21 2000   Date Primary Tubing Changed 05/01/21 05/03/21 0800   Date Secondary Tubing Changed 05/03/21 05/03/21 0800   Date IV Connector(s) Changed 05/01/21 05/03/21 0800   NEXT Primary Tubing Change  05/04/21 05/03/21 0800   NEXT Secondary Tubing Change  05/03/21 05/03/21 0800   NEXT IV Connector(s) Change 05/04/21 05/03/21 0800   Waveform Not Applicable 05/03/21 2000   Line Necessity Assessed Antibiotic Therapy Greater than 7 Days 05/03/21 0800     Wound:  @WOUNDLDA(4)@     Fluids:  Intake/Output       05/02/21 0700 - 05/03/21 0659 05/03/21 0700 - 05/04/21 0659 05/04/21 0700 - 05/05/21 0659      6753-2275 1102-4682 Total 0700-1859 1900-0659 Total 0700-1859 1900-0659 Total       Intake    I.V.  392.4  57.4 449.8  605.4  133.4 738.8  --  -- --    Magnesium Sulfate Volume 50 -- 50 50 -- 50 -- -- --    Ketamine Volume 248.4  41.4 289.8 414 121.8 535.8 -- -- --    Midazolam Volume 94 16 110 141.4 11.7 153 -- -- --    Blood  400  -- 400  --  -- --  --  -- --    Volume (RELEASE RED BLOOD CELLS) 400 -- 400 -- -- -- -- -- --    Other  --  -- --  340  -- 340  --  -- --    Medications (PO/Enteral Liquids) -- -- -- 340 -- 340 -- -- --    NG/GT  540  630 1170  --  -- --  --  -- --    Intake (mL) (Enteral Tube 04/21/21 Dobhoff - Gastric Right nare)  -- -- -- -- -- --    Enteral  290  90 380  --  200 200  --  -- --    Free Water / Tube Flush 290 90 380 -- 200 200 -- -- --    Total Intake 1622.4 777.4 2399.8 945.4 333.4 1278.8 -- -- --       Output    Urine  2080  800 2880  2470  900 3370  --  -- --    Output (mL) (Urethral Catheter) 2080 800 2880 2470 900 3370 -- -- --    Drains  0  -- 0  --  -- --  --  -- --    Residual Amount (mL) (Discarded) (Enteral Tube 04/21/21 Dobhoff - Gastric Right nare) 0 -- 0 -- -- -- -- -- --    Stool  --  -- --  --  -- --  --  -- --    Number of Times Stooled 0 x 1 x 1 x -- 1 x 1 x -- -- --    Emesis/NG output  0  -- 0  --  -- --  --  -- --    Output (mL) (Enteral Tube 04/21/21 Dobhoff - Gastric Right nare) 0 -- 0 -- -- -- -- -- --    Chest Tube  30  0 30  20  -- 20  --  -- --    Output (mL) (Chest Tube 2 Right Midaxillary) 30 0 30 10 -- 10 -- -- --    Output (mL) (Chest Tube 1 Right Midaxillary;Pleural) 0 0 0 10 -- 10 -- -- --    Total Output 2110 800 2910 2490 900 3390 -- -- --       Net I/O     -487.6 -22.6 -510.2 -1544.7 -566.6 -2111.2 -- -- --           GI/Nutrition:  Orders Placed This Encounter   Procedures   • Diet NPO     Standing Status:   Standing     Number of Occurrences:   1     Order Specific Question:   Restrict to:     Answer:   Strict [1]     Medications:  Current Facility-Administered Medications   Medication Last Admin   • potassium bicarbonate (KLYTE) effervescent tablet 25 mEq Stopped at 05/04/21 0715   • potassium bicarbonate (KLYTE) effervescent tablet 25 mEq     • dexmedetomidine  (PRECEDEX) 400 mcg/100mL NS premix infusion 0.4 mcg/kg/hr at 05/04/21 1340   • oxyCODONE immediate-release (ROXICODONE) tablet 15 mg 15 mg at 05/04/21 0519   • cefepime (MAXIPIME) 2 g in  mL IVPB Stopped at 05/04/21 0548   • HYDROmorphone (Dilaudid) injection 1-2 mg 2 mg at 05/04/21 1057   • furosemide (LASIX) injection 20 mg 20 mg at 05/04/21 0520   • carvedilol (COREG) tablet 6.25 mg 6.25 mg at 05/03/21 1808   • midazolam (Versed) 2 MG/2ML injection 2 mg 2 mg at 05/03/21 2045   • labetalol (NORMODYNE/TRANDATE) injection 10 mg 10 mg at 05/04/21 0226   • enoxaparin (LOVENOX) inj 40 mg 40 mg at 05/04/21 0518   • Pharmacy Consult: Enteral tube insertion - review meds/change route/product selection     • acetaminophen (Tylenol) tablet 650 mg 650 mg at 05/04/21 0426   • magnesium hydroxide (MILK OF MAGNESIA) suspension 30 mL      And   • senna-docusate (PERICOLACE or SENOKOT S) 8.6-50 MG per tablet 2 tablet 2 tablet at 05/04/21 0519    And   • polyethylene glycol/lytes (MIRALAX) PACKET 1 Packet      And   • bisacodyl (DULCOLAX) suppository 10 mg     • divalproex (DEPAKOTE SPRINKLE) capsule 500 mg 500 mg at 05/04/21 0519   • DULoxetine (CYMBALTA) capsule 60 mg 60 mg at 05/04/21 0519   • PARoxetine (PAXIL) tablet 10 mg 10 mg at 05/04/21 0519   • risperiDONE (RISPERDAL) tablet 2 mg 2 mg at 05/04/21 0519   • Respiratory Therapy Consult     • famotidine (PEPCID) tablet 20 mg 20 mg at 05/04/21 0519   • MD Alert...ICU Electrolyte Replacement per Pharmacy     • lidocaine (XYLOCAINE) 1 % injection 1-2 mL     • ipratropium-albuterol (DUONEB) nebulizer solution       Medical Decision Making, by Problem:  Active Hospital Problems    Diagnosis    • *Acute bacterial endocarditis [I33.0]    • Agitation requiring sedation protocol [R45.1]    • 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]    • Acute respiratory failure with hypoxia (HCC) [J96.01]    • Empyema of right pleural space (HCC) [J86.9]    • Acute encephalopathy [G93.40]    • Fever [R50.9]    •  Pleural effusion, left [J90]    • Atelectasis [J98.11]    • CSF pleocytosis [D72.9]    • Pneumonia [J18.9]    • Tachycardia [R00.0]    • Pseudotumor cerebri [G93.2]    • S/P  shunt [Z98.2]    • Prolonged Q-T interval on ECG [R94.31]    • History of vasculitis [Z86.79]    • History of complicated migraines [G43.109]    • Hyponatremia [E87.1]    • H/O: CVA (cerebrovascular accident) [Z86.73]    • Chronic pain syndrome [G89.4]    • Thrombocytopenia (HCC) [D69.6]    • Spinal cord stimulator status [Z96.89]    • Goals of care, counseling/discussion [Z71.89]    • Abnormal movement [G25.9]    • Pulmonary abscess (HCC) [J85.2]    • Anasarca [R60.1]    • Thrombocytosis (HCC) [D47.3]    • Bacteremia due to methicillin susceptible Staphylococcus aureus (MSSA) [R78.81, B95.61]    • GERD (gastroesophageal reflux disease) [K21.9]    • Septic shock (HCC) [A41.9, R65.21]    • Anemia [D64.9]    • Hypokalemia [E87.6]        Impression   -Severe sepsis  -Catheter related bloodstream infection due to infected port status post removal 4/12-staph aureus  -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       - Acute left empyema s/p chest tube placement 4/23  -Multiple acute septic pulmonary emboli bilaterally  -Acute bilateral pneumonia due to above     --Pulmonary edema  -Pseudotumor cerebri with underlying  shunt: possible arachnoiditis  -Chronic migraine headaches  -History of autoimmune vasculitis  -Elevated alkaline phosphatase & bilirubin  -Acute encephalopathy due to sepsis      - anemia due to above      - New secondary Klebsiella pneumoniae pneumonia (presumed VAP)     Plan   - Persistent fever likely attributable to empyema and multiple lung abscesses. Currently covered. Source control.  - Continue cefepime  - Vent management per pulmonology - SBT trials  - Tolerated the decortication. Cx NGTD. Another surgery  likely will become necessary. Per thoracic surgery.  - Now off pressors   - No IV abx changes or further ID workup in the moment   - Continue to monitor closely    > 30+ min on floor in ICU with > 50% face to face view and 100% in direct patient care/counseling/consulting today.    Dr Lowery

## 2021-05-04 NOTE — PROGRESS NOTES
IR Nursing Note:    Order received gastrostomy tube placement.  After Dr. Iglesias and Dr. Ulloa reviewed images from 2015 of abdomen and chest ct.  G-tube would be difficult to complete due to patients anatomy.  Bedside RN updated.  Per MD will cancel IR order.

## 2021-05-04 NOTE — PROGRESS NOTES
Progress Note:    S: More alert off sedation    O:  Recent Labs     05/02/21 0510 05/03/21 0520 05/04/21  0455   WBC 17.0* 18.0* 21.5*   RBC 2.22* 2.57* 2.73*   HEMOGLOBIN 6.6* 7.8* 8.2*   HEMATOCRIT 21.0* 24.2* 26.3*   MCV 94.6 94.2 96.3   MCH 29.7 30.4 30.0   MCHC 31.4* 32.2* 31.2*   RDW 56.2* 56.7* 59.0*   PLATELETCT 403 383 370   MPV 8.8* 8.9* 9.1     Recent Labs     05/02/21 0510 05/03/21 0520 05/04/21 0455   SODIUM 136 128* 136   POTASSIUM 3.2* 3.5* 3.7   CHLORIDE 104 101 104   CO2 23 22 24   GLUCOSE 117* 93 108*   BUN 12 15 17   CREATININE <0.17* <0.17* <0.17*   CALCIUM 8.0* 8.1* 8.2*         Current Facility-Administered Medications   Medication Dose   • potassium bicarbonate (KLYTE) effervescent tablet 25 mEq  25 mEq   • potassium bicarbonate (KLYTE) effervescent tablet 25 mEq  25 mEq   • dexmedetomidine (PRECEDEX) 400 mcg/100mL NS premix infusion  0.1-1.5 mcg/kg/hr   • oxyCODONE immediate-release (ROXICODONE) tablet 15 mg  15 mg   • cefepime (MAXIPIME) 2 g in  mL IVPB  2 g   • HYDROmorphone (Dilaudid) injection 1-2 mg  1-2 mg   • furosemide (LASIX) injection 20 mg  20 mg   • carvedilol (COREG) tablet 6.25 mg  6.25 mg   • midazolam (Versed) 2 MG/2ML injection 2 mg  2 mg   • labetalol (NORMODYNE/TRANDATE) injection 10 mg  10 mg   • enoxaparin (LOVENOX) inj 40 mg  40 mg   • Pharmacy Consult: Enteral tube insertion - review meds/change route/product selection  1 Each   • acetaminophen (Tylenol) tablet 650 mg  650 mg   • magnesium hydroxide (MILK OF MAGNESIA) suspension 30 mL  30 mL    And   • senna-docusate (PERICOLACE or SENOKOT S) 8.6-50 MG per tablet 2 tablet  2 tablet    And   • polyethylene glycol/lytes (MIRALAX) PACKET 1 Packet  1 Packet    And   • bisacodyl (DULCOLAX) suppository 10 mg  10 mg   • divalproex (DEPAKOTE SPRINKLE) capsule 500 mg  500 mg   • DULoxetine (CYMBALTA) capsule 60 mg  60 mg   • PARoxetine (PAXIL) tablet 10 mg  10 mg   • risperiDONE (RISPERDAL) tablet 2 mg  2 mg   •  "Respiratory Therapy Consult     • famotidine (PEPCID) tablet 20 mg  20 mg   • MD Alert...ICU Electrolyte Replacement per Pharmacy     • lidocaine (XYLOCAINE) 1 % injection 1-2 mL  1-2 mL   • ipratropium-albuterol (DUONEB) nebulizer solution  3 mL       PE:  /72   Pulse (!) 113   Temp 37.6 °C (99.7 °F) (Bladder)   Resp 17   Ht 1.6 m (5' 2.99\")   Wt 83.8 kg (184 lb 11.9 oz)   SpO2 97%     Intake/Output Summary (Last 24 hours) at 5/4/2021 1301  Last data filed at 5/4/2021 0425  Gross per 24 hour   Intake 595.36 ml   Output 1855 ml   Net -1259.64 ml       Chest tubes: no air leak, minimal drainage    Rads:  DX-CHEST-PORTABLE (1 VIEW)   Final Result      Stable chest findings compared with prior.      DX-CHEST-PORTABLE (1 VIEW)   Final Result         1. No significant interval change.      US-EXTREMITY ARTERY LOWER UNILAT RIGHT   Final Result      DX-CHEST-PORTABLE (1 VIEW)   Final Result         1. No significant interval change.      IR-PICC LINE PLACEMENT W/ GUIDANCE > AGE 5   Final Result                  Ultrasound-guided PICC placement performed by qualified nursing staff as    above.          DX-CHEST-PORTABLE (1 VIEW)   Final Result         1.  Pulmonary edema and/or infiltrates, similar to prior study.   2.  Stable opacification right lung, likely effusion. Thoracostomy tubes are in place.   3.  Multiple cavitary appearing left pulmonary lesions, see CT performed April 27, 2021 for further characterization      DX-CHEST-PORTABLE (1 VIEW)   Final Result         1.  Pulmonary edema and/or infiltrates, similar to prior study.   2.  Single opacification right lung, likely effusion. Thoracostomy tubes are in place.   3.  Multiple cavitary appearing left pulmonary lesions, see CT performed April 27, 2021 for further characterization   4.  Soft tissue gas in the right chest wall      DX-CHEST-PORTABLE (1 VIEW)   Final Result         1.  Pulmonary edema and/or infiltrates, similar to prior study.   2.  " Increased opacification right lung, likely increased effusion. Thoracostomy tubes are in place.   3.  Multiple cavitary appearing left pulmonary lesions, see CT performed April 27, 2021 for further characterization   4.  Soft tissue gas in the right chest wall      DX-CHEST-PORTABLE (1 VIEW)   Final Result         1.  Pulmonary edema and/or infiltrates, similar to prior study.   2.  Right pneumothorax, stable compared to prior study, right thoracostomy tubes remain in place   3.  Multiple cavitary appearing left pulmonary lesions, see CT performed April 27, 2021 for further characterization   4.  Soft tissue gas in the right chest wall      DX-CHEST-PORTABLE (1 VIEW)   Final Result         1.  Pulmonary edema and/or infiltrates, similar to prior study.   2.  Right pneumothorax, interval decreased compared to prior study, interval placement of right thoracostomy tubes is noted.   3.  Multiple cavitary appearing left pulmonary lesions, see CT performed yesterday for further characterization   4.  Soft tissue gas in the right chest wall      DX-CHEST-PORTABLE (1 VIEW)   Final Result         1.  Pulmonary edema and/or infiltrates, similar to prior study.   2.  Right pneumothorax, stable compared to prior study, interval removal of right thoracostomy tube is noted.   3.  Multiple cavitary appearing left pulmonary lesions, see CT performed yesterday for further characterization   4.  Soft tissue gas in the right chest wall      DX-CHEST-LIMITED (1 VIEW)   Final Result         No significant interval change.      CT-CTA CHEST PULMONARY ARTERY W/ RECONS   Final Result         No CT evidence of pulmonary embolus.      Previous right chest tube were removed.      Grossly similar in size of the loculated right hydropneumothorax but there is increased layering fluid component. Unchanged mild shift of the mediastinal to the left.      Redemonstration of a pigtail catheter in the medial left lung base. Grossly similar multiple  cavitary lesions in the left lung.      US-EXTREMITY VENOUS LOWER BILAT   Final Result      POCT BUTTERFLY-DUPLEX SCAN EXTREMITY VEINS LIMITED   Final Result      DX-CHEST-PORTABLE (1 VIEW)   Final Result         1.  Pulmonary edema and/or infiltrates, similar to prior study.   2.  Right pneumothorax, somewhat decreased to prior study, interval removal of right thoracostomy tube is noted.   3.  Multiple cavitary appearing left pulmonary lesions, see CT performed yesterday for further characterization   4.  Soft tissue gas in the right chest wall      DX-CHEST-PORTABLE (1 VIEW)   Final Result         1.  Pulmonary edema and/or infiltrates, similar to prior study.   2.  Right pneumothorax, similar to prior study with thoracostomy tube in place.   3.  Multiple cavitary appearing left pulmonary lesions, see CT performed yesterday for further characterization      CT-CHEST (THORAX) W/O   Final Result      Large loculated right hydropneumothorax with interval increase in size of the pneumothorax and pleural fluid.      Right chest tube is in the posterior right thorax.      There is mediastinal shift to the left compatible with tension.      Small pericardial effusion.      Small loculated left pleural effusion with overlying atelectasis/consolidation.   Pigtail catheter is seen at the medial left lung base. Pleural effusion has decreased in size compared to prior.      There are multiple foci of air extending from the right lung to the pleural space suspicious for a bronchopleural fistula.      Multiple cavitary lesions bilaterally with mild interval decrease of the solid component of the cavitary nodules.      Noncavitary 3.1 cm masslike density is seen at the left lung base.      Findings may be related to septic emboli, malignancy or multifocal abscesses. Short interval follow-up recommended.      Soft tissue air on the right is again seen.      Splenomegaly      Findings discussed with Leti Sun.       DX-CHEST-PORTABLE (1 VIEW)   Final Result      No significant change from prior exam.      CT-HEAD W/O   Final Result      1.  RIGHT frontal ventriculostomy catheter unchanged in position.   2.  Mild cortical atrophy.   3.  No intracranial hemorrhage.   4.  Apparent dural thickening overlying the clivus.  Consider further evaluation with contrast-enhanced MRI.      DX-CHEST-LIMITED (1 VIEW)   Final Result      Interval left basilar pigtail catheter with diminished atelectasis, pleural fluid      Stable right hydropneumothorax with thoracostomy tube in place, considerable soft tissue gas and partial lung collapse      IR-CHEST TUBE-EMPYEMA LEFT   Final Result      1.  CT GUIDED LEFT  10 [place] PIGTAIL CHEST TUBE PLACEMENT FOR POSSIBLE EMPYEMA YIELDING OLD BLOODY FLUID.      2. A CHEST RADIOGRAPH IS FORTHCOMING.      EC-MICAH W/O CONT   Final Result      US-ABDOMEN LTD (SOFT TISSUE)   Final Result      No fluid seen surrounding the electronic implanted spinal stimulator device.      DX-CHEST-LIMITED (1 VIEW)   Final Result      1.  Large right hydropneumothorax with right-sided chest tube in place, likely stable to slightly decreased from prior study.   2.  Small left pleural effusion. Mild improved aeration in the left lung.   3.  Bilateral pulmonary opacities which could be related to combination of atelectasis, edema and/or infection..      CT-CTA HEAD WITH & W/O-POST PROCESS   Final Result      1.  Patent carotid and vertebral arteries.      2.  Again seen right frontal the jugular peritoneal shunt catheter. There is mild increase in volume of the lateral and third ventricles.      CT-CHEST (THORAX) W/O   Final Result      1.  Interval placement of a right-sided chest tube into a large loculated right-sided pleural effusion. There is now seen a large right-sided hydropneumothorax in the area that previously seen fluid. The chest tube extends into that hydropneumothorax.    There is some residual pleural fluid seen  as well. A hydropneumothorax is somewhat loculated with components most prominently inferiorly and medially.      2.  New loculated left pleural effusion.      3.  Again seen multiple bilateral cavitary pulmonary masses which are more prominent than previously seen with increasing cavitation and measure up to 3 cm size. Differential diagnosis includes septic emboli, multifocal abscesses and neoplasm.      4.  Large amount of subcutaneous emphysema on the right.      5.  Splenomegaly.      6.  Multiple support devices.      Fleischner Society pulmonary nodule recommendations:         DX-CHEST-LIMITED (1 VIEW)   Final Result      1.  Support devices as described above.      2.  Right chest tube present with a again seen right-sided pneumothorax, possibly increased in size and loculated.      3.  Worsening bilateral pulmonary infiltrates.      DX-ABDOMEN FOR TUBE PLACEMENT   Final Result      Cortrak feeding tube tip projects in the region of the duodenal bulb.      DX-CHEST-LIMITED (1 VIEW)   Final Result      Loculated right pneumothorax is decreased in size compared to prior.      Small left pleural effusion.      Small loculated right pleural effusion.      Extensive bilateral airspace opacities are not significantly changed.      Soft tissue air on the right is again noted.      DX-CHEST-LIMITED (1 VIEW)   Final Result      Decreased partially loculated right pleural fluid with increased pleural air. Right chest tube is in place.      Increased hazy opacity in the left lung possibly atelectasis.         US-ABDOMEN LTD (SOFT TISSUE)   Final Result      No drainable fluid collection.      DX-CHEST-PORTABLE (1 VIEW)   Final Result      Decreased partially loculated right pleural fluid with increased pleural air. Right chest tube is in place.      No other significant change.      DX-CHEST-PORTABLE (1 VIEW)   Final Result         1.  Pulmonary edema and/or infiltrates are identified, which are somewhat decreased since  the prior exam.   2.  Moderate loculated appearing right pleural effusion, slightly decreased since prior      DX-ABDOMEN FOR TUBE PLACEMENT   Final Result      Feeding tube tip at the distal stomach.      DX-CHEST-PORTABLE (1 VIEW)   Final Result         1.  New chest tube is noted in the right lower hemithorax.      2.  Right pleural effusion is again identified which appears similar to the prior exam.      3.  No new infiltrates or consolidations are identified.      DX-CHEST-PORTABLE (1 VIEW)   Final Result         1.  Pulmonary edema and/or infiltrates are identified, which are stable since the prior exam.   2.  Moderate loculated appearing right pleural effusion      DX-CHEST-PORTABLE (1 VIEW)   Final Result         No significant interval change.      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]      Priority: High   • Acute respiratory failure with hypoxia (HCC) [J96.01]      Priority: High   • Acute bacterial endocarditis [I33.0]      Priority: High   • Empyema of right pleural space (HCC) [J86.9]      Priority: High   • Bacteremia due to methicillin susceptible Staphylococcus aureus (MSSA) [R78.81, B95.61]      Priority: High   • Acute encephalopathy [G93.40]      Priority: High   • Fever [R50.9]      Priority: Medium   • Pleural effusion, left [J90]      Priority: Medium   • Atelectasis [J98.11]      Priority: Medium   • CSF pleocytosis [D72.9]      Priority: Medium   • Pneumonia [J18.9]      Priority: Medium   • Tachycardia [R00.0]      Priority: Medium   • Pseudotumor cerebri [G93.2]      Priority: Medium     IMO load March 2020     • S/P  shunt [Z98.2]      Priority: Medium   • Prolonged Q-T interval on ECG [R94.31]      Priority: Low   • History of vasculitis [Z86.79]      Priority: Low   • History of complicated migraines [G43.109]       Priority: Low   • Hyponatremia [E87.1]      Priority: Low   • H/O: CVA (cerebrovascular accident) [Z86.73]      Priority: Low   • Chronic pain syndrome [G89.4]      Priority: Low   • Thrombocytopenia (HCC) [D69.6]      Priority: Low   • Spinal cord stimulator status [Z96.89]    • Goals of care, counseling/discussion [Z71.89]    • Abnormal movement [G25.9]    • Pulmonary abscess (HCC) [J85.2]    • Anasarca [R60.1]    • Thrombocytosis (HCC) [D47.3]    • GERD (gastroesophageal reflux disease) [K21.9]    • Septic shock (HCC) [A41.9, R65.21]    • Anemia [D64.9]    • Hypokalemia [E87.6]          P: Continue chest tubes to suction  Still significant lung trapping  May need more definitive decortication by thoracotomy in future.    John Ganser M.D.  [facility]

## 2021-05-04 NOTE — CARE PLAN
Problem: Communication  Goal: The ability to communicate needs accurately and effectively will improve  Outcome: PROGRESSING AS EXPECTED   Patient able to nod/shake head to answer yes/no questions appropriately.  Problem: Safety  Goal: Will remain free from falls  Outcome: PROGRESSING AS EXPECTED   No falls this shift.  Problem: Bowel/Gastric:  Goal: Normal bowel function is maintained or improved  Outcome: PROGRESSING AS EXPECTED   Patient had bowel movement this shift.  Problem: Pain Management  Goal: Pain level will decrease to patient's comfort goal  Outcome: PROGRESSING AS EXPECTED   Patient able to communicate needs for pain management.

## 2021-05-05 ENCOUNTER — APPOINTMENT (OUTPATIENT)
Dept: RADIOLOGY | Facility: MEDICAL CENTER | Age: 51
DRG: 003 | End: 2021-05-05
Attending: INTERNAL MEDICINE
Payer: MEDICARE

## 2021-05-05 LAB
ABO GROUP BLD: ABNORMAL
ANION GAP SERPL CALC-SCNC: 7 MMOL/L (ref 7–16)
BASOPHILS # BLD AUTO: 0.6 % (ref 0–1.8)
BASOPHILS # BLD: 0.09 K/UL (ref 0–0.12)
BLD GP AB SCN SERPL QL: ABNORMAL
BUN SERPL-MCNC: 19 MG/DL (ref 8–22)
CALCIUM SERPL-MCNC: 8.2 MG/DL (ref 8.5–10.5)
CHLORIDE SERPL-SCNC: 102 MMOL/L (ref 96–112)
CO2 SERPL-SCNC: 26 MMOL/L (ref 20–33)
CREAT SERPL-MCNC: <0.17 MG/DL (ref 0.5–1.4)
EOSINOPHIL # BLD AUTO: 0 K/UL (ref 0–0.51)
EOSINOPHIL NFR BLD: 0 % (ref 0–6.9)
ERYTHROCYTE [DISTWIDTH] IN BLOOD BY AUTOMATED COUNT: 61.8 FL (ref 35.9–50)
GLUCOSE SERPL-MCNC: 93 MG/DL (ref 65–99)
HCT VFR BLD AUTO: 23.1 % (ref 37–47)
HGB BLD-MCNC: 7.1 G/DL (ref 12–16)
IMM GRANULOCYTES # BLD AUTO: 0.32 K/UL (ref 0–0.11)
IMM GRANULOCYTES NFR BLD AUTO: 2.1 % (ref 0–0.9)
LYMPHOCYTES # BLD AUTO: 1.37 K/UL (ref 1–4.8)
LYMPHOCYTES NFR BLD: 9.1 % (ref 22–41)
MAGNESIUM SERPL-MCNC: 1.8 MG/DL (ref 1.5–2.5)
MCH RBC QN AUTO: 30 PG (ref 27–33)
MCHC RBC AUTO-ENTMCNC: 30.7 G/DL (ref 33.6–35)
MCV RBC AUTO: 97.5 FL (ref 81.4–97.8)
MONOCYTES # BLD AUTO: 1.15 K/UL (ref 0–0.85)
MONOCYTES NFR BLD AUTO: 7.6 % (ref 0–13.4)
NEUTROPHILS # BLD AUTO: 12.2 K/UL (ref 2–7.15)
NEUTROPHILS NFR BLD: 80.6 % (ref 44–72)
NRBC # BLD AUTO: 0 K/UL
NRBC BLD-RTO: 0 /100 WBC
PLATELET # BLD AUTO: 326 K/UL (ref 164–446)
PMV BLD AUTO: 9.3 FL (ref 9–12.9)
POTASSIUM SERPL-SCNC: 3.4 MMOL/L (ref 3.6–5.5)
RBC # BLD AUTO: 2.37 M/UL (ref 4.2–5.4)
RH BLD: ABNORMAL
SODIUM SERPL-SCNC: 135 MMOL/L (ref 135–145)
WBC # BLD AUTO: 15.1 K/UL (ref 4.8–10.8)

## 2021-05-05 PROCEDURE — 86850 RBC ANTIBODY SCREEN: CPT

## 2021-05-05 PROCEDURE — 700111 HCHG RX REV CODE 636 W/ 250 OVERRIDE (IP): Performed by: INTERNAL MEDICINE

## 2021-05-05 PROCEDURE — 700102 HCHG RX REV CODE 250 W/ 637 OVERRIDE(OP): Performed by: INTERNAL MEDICINE

## 2021-05-05 PROCEDURE — 94799 UNLISTED PULMONARY SVC/PX: CPT

## 2021-05-05 PROCEDURE — 71045 X-RAY EXAM CHEST 1 VIEW: CPT

## 2021-05-05 PROCEDURE — 700105 HCHG RX REV CODE 258: Performed by: INTERNAL MEDICINE

## 2021-05-05 PROCEDURE — 770022 HCHG ROOM/CARE - ICU (200)

## 2021-05-05 PROCEDURE — 86900 BLOOD TYPING SEROLOGIC ABO: CPT

## 2021-05-05 PROCEDURE — 86901 BLOOD TYPING SEROLOGIC RH(D): CPT

## 2021-05-05 PROCEDURE — A9270 NON-COVERED ITEM OR SERVICE: HCPCS | Performed by: INTERNAL MEDICINE

## 2021-05-05 PROCEDURE — P9016 RBC LEUKOCYTES REDUCED: HCPCS

## 2021-05-05 PROCEDURE — 99291 CRITICAL CARE FIRST HOUR: CPT | Performed by: INTERNAL MEDICINE

## 2021-05-05 PROCEDURE — 97166 OT EVAL MOD COMPLEX 45 MIN: CPT

## 2021-05-05 PROCEDURE — 83735 ASSAY OF MAGNESIUM: CPT

## 2021-05-05 PROCEDURE — 86923 COMPATIBILITY TEST ELECTRIC: CPT

## 2021-05-05 PROCEDURE — 80048 BASIC METABOLIC PNL TOTAL CA: CPT

## 2021-05-05 PROCEDURE — 36430 TRANSFUSION BLD/BLD COMPNT: CPT

## 2021-05-05 PROCEDURE — 85025 COMPLETE CBC W/AUTO DIFF WBC: CPT

## 2021-05-05 PROCEDURE — 700101 HCHG RX REV CODE 250: Performed by: INTERNAL MEDICINE

## 2021-05-05 PROCEDURE — 94003 VENT MGMT INPAT SUBQ DAY: CPT

## 2021-05-05 PROCEDURE — 94640 AIRWAY INHALATION TREATMENT: CPT

## 2021-05-05 RX ORDER — OXYCODONE HYDROCHLORIDE 10 MG/1
20 TABLET ORAL EVERY 4 HOURS
Status: DISCONTINUED | OUTPATIENT
Start: 2021-05-05 | End: 2021-05-13

## 2021-05-05 RX ORDER — MAGNESIUM SULFATE HEPTAHYDRATE 40 MG/ML
2 INJECTION, SOLUTION INTRAVENOUS ONCE
Status: COMPLETED | OUTPATIENT
Start: 2021-05-05 | End: 2021-05-05

## 2021-05-05 RX ORDER — TRAMADOL HYDROCHLORIDE 50 MG/1
50 TABLET ORAL EVERY 6 HOURS PRN
Status: DISCONTINUED | OUTPATIENT
Start: 2021-05-05 | End: 2021-05-17

## 2021-05-05 RX ADMIN — OXYCODONE HYDROCHLORIDE 15 MG: 10 TABLET ORAL at 01:29

## 2021-05-05 RX ADMIN — DULOXETINE HYDROCHLORIDE 60 MG: 60 CAPSULE, DELAYED RELEASE ORAL at 17:08

## 2021-05-05 RX ADMIN — OXYCODONE HYDROCHLORIDE 15 MG: 10 TABLET ORAL at 05:11

## 2021-05-05 RX ADMIN — HYDROMORPHONE HYDROCHLORIDE 2 MG: 1 INJECTION, SOLUTION INTRAMUSCULAR; INTRAVENOUS; SUBCUTANEOUS at 07:32

## 2021-05-05 RX ADMIN — MIDAZOLAM HYDROCHLORIDE 2 MG: 1 INJECTION, SOLUTION INTRAMUSCULAR; INTRAVENOUS at 04:20

## 2021-05-05 RX ADMIN — MAGNESIUM SULFATE 2 G: 2 INJECTION INTRAVENOUS at 09:31

## 2021-05-05 RX ADMIN — HYDROMORPHONE HYDROCHLORIDE 1 MG: 1 INJECTION, SOLUTION INTRAMUSCULAR; INTRAVENOUS; SUBCUTANEOUS at 22:55

## 2021-05-05 RX ADMIN — ACETAMINOPHEN 650 MG: 325 TABLET, FILM COATED ORAL at 17:07

## 2021-05-05 RX ADMIN — POTASSIUM BICARBONATE 50 MEQ: 978 TABLET, EFFERVESCENT ORAL at 17:07

## 2021-05-05 RX ADMIN — CEFEPIME 2 G: 2 INJECTION, POWDER, FOR SOLUTION INTRAVENOUS at 01:29

## 2021-05-05 RX ADMIN — DIVALPROEX SODIUM 500 MG: 125 CAPSULE ORAL at 14:21

## 2021-05-05 RX ADMIN — CARVEDILOL 6.25 MG: 6.25 TABLET, FILM COATED ORAL at 17:08

## 2021-05-05 RX ADMIN — HYDROMORPHONE HYDROCHLORIDE 1 MG: 1 INJECTION, SOLUTION INTRAMUSCULAR; INTRAVENOUS; SUBCUTANEOUS at 20:05

## 2021-05-05 RX ADMIN — ACETAMINOPHEN 650 MG: 325 TABLET, FILM COATED ORAL at 21:20

## 2021-05-05 RX ADMIN — HYDROMORPHONE HYDROCHLORIDE 2 MG: 1 INJECTION, SOLUTION INTRAMUSCULAR; INTRAVENOUS; SUBCUTANEOUS at 12:37

## 2021-05-05 RX ADMIN — DIVALPROEX SODIUM 500 MG: 125 CAPSULE ORAL at 21:20

## 2021-05-05 RX ADMIN — DULOXETINE HYDROCHLORIDE 60 MG: 60 CAPSULE, DELAYED RELEASE ORAL at 05:11

## 2021-05-05 RX ADMIN — HYDROMORPHONE HYDROCHLORIDE 2 MG: 1 INJECTION, SOLUTION INTRAMUSCULAR; INTRAVENOUS; SUBCUTANEOUS at 02:12

## 2021-05-05 RX ADMIN — CEFEPIME 2 G: 2 INJECTION, POWDER, FOR SOLUTION INTRAVENOUS at 17:09

## 2021-05-05 RX ADMIN — ACETAMINOPHEN 650 MG: 325 TABLET, FILM COATED ORAL at 02:12

## 2021-05-05 RX ADMIN — CARVEDILOL 6.25 MG: 6.25 TABLET, FILM COATED ORAL at 07:32

## 2021-05-05 RX ADMIN — FUROSEMIDE 20 MG: 10 INJECTION, SOLUTION INTRAMUSCULAR; INTRAVENOUS at 17:21

## 2021-05-05 RX ADMIN — CEFEPIME 2 G: 2 INJECTION, POWDER, FOR SOLUTION INTRAVENOUS at 09:31

## 2021-05-05 RX ADMIN — FAMOTIDINE 20 MG: 20 TABLET ORAL at 17:07

## 2021-05-05 RX ADMIN — ENOXAPARIN SODIUM 40 MG: 40 INJECTION SUBCUTANEOUS at 05:11

## 2021-05-05 RX ADMIN — DIVALPROEX SODIUM 500 MG: 125 CAPSULE ORAL at 05:11

## 2021-05-05 RX ADMIN — OXYCODONE HYDROCHLORIDE 15 MG: 10 TABLET ORAL at 09:31

## 2021-05-05 RX ADMIN — HYDROMORPHONE HYDROCHLORIDE 2 MG: 1 INJECTION, SOLUTION INTRAMUSCULAR; INTRAVENOUS; SUBCUTANEOUS at 04:20

## 2021-05-05 RX ADMIN — PAROXETINE HYDROCHLORIDE 10 MG: 20 TABLET, FILM COATED ORAL at 05:11

## 2021-05-05 RX ADMIN — RISPERIDONE 2 MG: 1 TABLET, FILM COATED ORAL at 05:11

## 2021-05-05 RX ADMIN — FAMOTIDINE 20 MG: 20 TABLET ORAL at 05:11

## 2021-05-05 RX ADMIN — HYDROMORPHONE HYDROCHLORIDE 1 MG: 1 INJECTION, SOLUTION INTRAMUSCULAR; INTRAVENOUS; SUBCUTANEOUS at 14:18

## 2021-05-05 RX ADMIN — POTASSIUM BICARBONATE 25 MEQ: 978 TABLET, EFFERVESCENT ORAL at 05:11

## 2021-05-05 RX ADMIN — OXYCODONE HYDROCHLORIDE 20 MG: 10 TABLET ORAL at 17:08

## 2021-05-05 RX ADMIN — POTASSIUM BICARBONATE 25 MEQ: 978 TABLET, EFFERVESCENT ORAL at 11:36

## 2021-05-05 RX ADMIN — RISPERIDONE 2 MG: 1 TABLET, FILM COATED ORAL at 17:08

## 2021-05-05 RX ADMIN — OXYCODONE HYDROCHLORIDE 20 MG: 10 TABLET ORAL at 21:20

## 2021-05-05 RX ADMIN — OXYCODONE HYDROCHLORIDE 20 MG: 10 TABLET ORAL at 14:06

## 2021-05-05 RX ADMIN — TRAMADOL HYDROCHLORIDE 50 MG: 50 TABLET ORAL at 17:07

## 2021-05-05 RX ADMIN — DEXMEDETOMIDINE 0.2 MCG/KG/HR: 200 INJECTION, SOLUTION INTRAVENOUS at 16:35

## 2021-05-05 ASSESSMENT — COGNITIVE AND FUNCTIONAL STATUS - GENERAL
DAILY ACTIVITIY SCORE: 11
PERSONAL GROOMING: A LOT
EATING MEALS: TOTAL
SUGGESTED CMS G CODE MODIFIER DAILY ACTIVITY: CL
DRESSING REGULAR LOWER BODY CLOTHING: A LOT
TOILETING: A LOT
DRESSING REGULAR UPPER BODY CLOTHING: A LOT
HELP NEEDED FOR BATHING: A LOT

## 2021-05-05 ASSESSMENT — ACTIVITIES OF DAILY LIVING (ADL): TOILETING: INDEPENDENT

## 2021-05-05 ASSESSMENT — PAIN DESCRIPTION - PAIN TYPE
TYPE: ACUTE PAIN
TYPE: CHRONIC PAIN
TYPE: ACUTE PAIN
TYPE: CHRONIC PAIN
TYPE: CHRONIC PAIN
TYPE: ACUTE PAIN

## 2021-05-05 ASSESSMENT — ENCOUNTER SYMPTOMS
SHORTNESS OF BREATH: 1
ABDOMINAL PAIN: 1

## 2021-05-05 ASSESSMENT — FIBROSIS 4 INDEX: FIB4 SCORE: 0.69

## 2021-05-05 NOTE — PROGRESS NOTES
CXR stable  Continue chest tubes to suction  Will need repeat CT at some point to reassess amount of lung trapping.

## 2021-05-05 NOTE — THERAPY
Occupational Therapy   Initial Evaluation     Patient Name: Enedelia Pang  Age:  50 y.o., Sex:  female  Medical Record #: [id number]  Today's Date: 5/5/2021     Precautions  Precautions: Fall Risk, Tracheostomy , Nasogastric Tube, Chest tubes  Comments: Chest tubes x2    Assessment  Patient is 50 y.o. female admitted from [facility] for MSSA from port infection. 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 Per chart will likely need additional thoracic surgery. PMHx of pseudotumor cerebri s/p  shunt and port placement for chronic pain management which has been complicated by recurrent port infections and subsequent replacements. Reports  can assist PRN and mother can continue to assist with driving. Currently limited by decreased functional mobility, activity tolerance, cognition, strength, AROM, coordination, balance, edema, and pain which are currently affecting pt's ability to complete ADLs/IADLs at baseline. Will continue to follow.     Plan    Recommend Occupational Therapy 3 times per week until therapy goals are met for the following treatments:  Adaptive Equipment, Neuro Re-Education / Balance, Self Care/Activities of Daily Living, Therapeutic Activities and Therapeutic Exercises.    DC Equipment Recommendations: Unable to determine at this time  Discharge Recommendations: Recommend post-acute placement for additional occupational therapy services prior to discharge home      Objective     05/05/21 1158   Prior Living Situation   Prior Services Home-Independent   Housing / Facility 2 Story Apartment / Condo   Steps Into Home 14   Bathroom Set up Walk In Shower   Equipment Owned None   Lives with - Patient's Self Care Capacity Spouse   Comments Reports lives with spouse and son; spouse is home all the time and can assist PRN. Mother present and reports she does the driving   Prior  Level of ADL Function   Self Feeding Independent   Grooming / Hygiene Independent   Bathing Independent   Dressing Independent   Toileting Independent   Prior Level of IADL Function   Medication Management Independent   Laundry Independent   Kitchen Mobility Independent   Finances Independent   Home Management Independent   Shopping Independent   Prior Level Of Mobility Independent Without Device in Home;Independent Without Device in Community   Driving / Transportation Relatives / Others Provide Transportation   Occupation (Pre-Hospital Vocational) Employed Full Time  ()   Precautions   Precautions Fall Risk;Tracheostomy ;Nasogastric Tube   Vitals   O2 Delivery Device Ventilator   Cognition    Cognition / Consciousness X   Speech/ Communication Nods Appropriately;Intubated / Trached   Level of Consciousness Alert   Comments trached but cooperative and alert; mouths words   Passive ROM Upper Body   Passive ROM Upper Body WDL   Active ROM Upper Body   Active ROM Upper Body  X   Comments related to weakness; able to reach forehead with BUEs   Strength Upper Body   Upper Body Strength  X   Gross Strength Generalized Weakness, Equal Bilaterally.    Comments limited by BUE edema   Coordination Upper Body   Coordination X   Comments limited by pain/weakness   Balance Assessment   Sitting Balance (Static) Fair -   Sitting Balance (Dynamic) Fair -   Standing Balance (Static) Trace +   Standing Balance (Dynamic) Trace +   Weight Shift Sitting Fair   Weight Shift Standing Poor   Comments with B HHA   Bed Mobility    Supine to Sit Maximal Assist  (difficulty sequencing)   Sit to Supine Moderate Assist   Scooting Moderate Assist   Rolling Moderate Assist to Lt.   Comments HOB elevated and use of rail   ADL Assessment   Grooming   (declined need; reports completed oral care earlier)   Lower Body Dressing Maximal Assist   Toileting Maximal Assist  (nevarez and bedpan; max for pericare)   Comments limited by pain  and fatigue   How much help from another person does the patient currently need...   Putting on and taking off regular lower body clothing? 2   Bathing (including washing, rinsing, and drying)? 2   Toileting, which includes using a toilet, bedpan, or urinal? 2   Putting on and taking off regular upper body clothing? 2   Taking care of personal grooming such as brushing teeth? 2   Eating meals? 1   6 Clicks Daily Activity Score 11   Functional Mobility   Sit to Stand Moderate Assist  (min A x2 ppl)   Mobility EOB and STS x3   ICU Target Mobility Level   ICU Mobility - Targeted Level Level 3A   Edema / Skin Assessment   Edema / Skin  X   Comments BUE edema; edu on AROM and elevation   Activity Tolerance   Sitting Edge of Bed 20 min   Standing 2 min total   Comments limited by pain and fatigue   Patient / Family Goals   Patient / Family Goal #1 to get better   Short Term Goals   Short Term Goal # 1 LB dressing with min A   Short Term Goal # 2 seated g/h with SPV   Short Term Goal # 3 bsc txf with mod A

## 2021-05-05 NOTE — CARE PLAN
Problem: Safety  Goal: Will remain free from falls  Outcome: PROGRESSING AS EXPECTED  Fall precautions in place. Bed in lowest position. Non-skid socks in place. Personal possessions within reach. Mobility sign on door. Bed-alarm on. Call light within reach. Pt educated regarding fall prevention and states understanding.       Problem: Knowledge Deficit  Goal: Knowledge of disease process/condition, treatment plan, diagnostic tests, and medications will improve  Outcome: PROGRESSING AS EXPECTED   Pt educated regarding plan of care and medications. All questions answered.      Pt assessed for pain regularly and medicated PRN per MAR.

## 2021-05-05 NOTE — RESPIRATORY CARE
Ventilator Daily Summary     Vent Day #  V20 T5  Vent settings:  Spont: 10/8 30%  Rest: 24/300/8/30%   Ventilator settings changed this shift:  no  Plan: Continue current ventilator settings and wean mechanical ventilation as tolerated per physician orders.

## 2021-05-05 NOTE — PROGRESS NOTES
Infectious Disease Progress Note    Author: Brian Omer M.D. Date & Time created: 5/5/2021  12:39 PM     Cefepime 4/23 -  current Day #12     Thoracoscopy & Decortication - 4/28     PRIOR Rx:   Zosyn 4/22-4/23 S/P Thoracoscopy & Decortication Day #6  Previous: Unasyn, Zosyn, Vanco, Daptomycin  Ceftaroline: start 4/13-4/15  Nafcillin 2g Q4 hrs 4/15-4/23 4/14: Unable to give name of previous NSG or neurologist. Seems confused, but able to say some sentences fluently.   4/15: Line cultures now growing MSSA. As well as from the blood. Repeat blood culture 4/13+ in both sets. Patient has worsening confusion. CSF fluid analyses suggest pleocytosis. Cultures are no growth from the CSF. Chest CT was ordered this morning indicating a loculated right pleural effusion as well as bilateral septic emboli. Dr. Mack spoke with Dr. Oh yesterday and no surgical intervention unless clinical deterioration.  4/16: Increased respiratory distress.  Tachypneic.  CT with loculated collection.  Dr Resendiz not available.  No thoracic surgeon available.  Plans to have pulmonary place CT.  Transferring to ICU.  4/17: Transferred to [facility] last night. Hgb dropped to 6.4 requiring PRBC transfusion. Requiring 2 pressors. Fio2 50%. Febrile 38.8. Pending OR decortication of empyema and hemothorax. Chest tube placed at [facility] shows 8,371 WBC, ,000, 74% N  4/18: Chest tube was upsized by surgery yesterday, no decortication. WBC 22k. Fio2 40%. Vasopressin. Levophed down to 2mcg. Afebrile 24 hrs. Last fever 38.6 on 4/16.   4/19: Still on vent. Levo 3 mcg, vasopressin. Afebrile 72 hours. WBC 21k. BC 4/17 NGTD x 48 hours. Unable to do MRI brain given neurostimulator per RN.   4/20: Remaining afebrile. Hb 6.2 and undergoing transfusion today.WBC 24,100, 5% bands.1700 ml CT output. Copious bloody ET secretions. No pressors. Receiving dornase and TPA per CT. Right pleural effusion not large per CXR today.   4/21: WBC 31k. Bronchoscopy  yesterday showing blood. Cultures sent. TPA on hold per RN due to arvind blood from chest tube requiring PRBC transfusion for hgb 6. Pending chest CT today. Per , spinal stimulator is non-MRI compatible. Levophed 10mcg. 30% Fio2. Afebrile.   4/22: Fever 101.3 this am. WBC 20,300 down from 32,200 yesterday. BAL growing Klebsiella pneumoniae but susceptibility panel not set up until today. CTA of brain shows no lesion. CT of chest shows enlarging cavitary lung lesions bilat and large loculated new left pleural effusion and large hydropneumothorax on right. TPA & dornase infusions of right chest ended 4/20 after considerable bleeding requiring transfusion. Hb now down again to 6.8.  4/23: WBC 23k. Fio2 40%. Tmax 38.1. US of stiumlator shows small fluid collection but no drainable abscess. Pending MICAH today. Pending L chest tube placement  4/24: Continue fever 100.8-101.8. WBC 16,600. MICAH shows large vegetations on both tricuspid valve and mitral valve with mild TR & MR.  No aortic root abscess. Left chest tube placed yesterday yielding 8 ml of old bloody fluid. Bronch today revealed copious thick maroon secretions in distal trachea and both mainstem bronchi. On the right these were obstructive. Remaining off pressors. Last positive blood cultures (MSSA) were 4/16, negative 4/17.  4/25: Fever 100.6 this AM. wBC 13,300. Hb 6.6. CT: right hydropneumothorax increasing, right bronchopleural fistula, mediastinal shift ot the left , multiple cavitary pulmonary nodules, 3.1 cm left basilar mass, splenomegaly. CT of head shows dural thickening over the clivus. Lac+ GNR >100,000 col/ml growing from BAL of 4/24, presumed Klebsiella. Left peural fluid culture negative from 4/23.  4/26: Fever 100.4 last night. WBC 13,500. Hb 7.4. Plts 502,000. ESR >120. UA fairly clear except 30 mg% protein, 2-5 wbc and rare rbc. CXR unchanged except more prominent pulmonary edema. GNR in BAL may be a different species of Klebsiella, and  resistant to ampicillin & tmp-sulfa; confirmation pending.  4/28: Fever 100.8 last night. WBC 27,700. Hb 6.6. Plts 482,000. Creat 0.19. Kleb pneum in BAL on 4/24 is resistant to ampicillin & tmp-sulfa but otherwise sensitive. O2 sat 96% on FIO2 30% now, PEEP 8. Has been re-evaluated by thoracic surgeon, Dr. Ganser, who believes she will not wean without decortication on the right. Surgery anticipated today.  4/29: S/P right thoracoscopy with decortication with cultures NG at 24 hrs.  4/30: Had a bronch due to hypoxia and hypotension. CXR with worsening right hemithorax pulmonary opacities. Bloody mucous plugs removed. Pressors started. Febrile this am. Tachy in 130s. Pressors just removed. Tolerating vent, but not a SBT candidate at the moment.  5/1: Small air leak CT-2 every ~ 2/3 breathes. The K. pneumoniae from her thoracoscopy is sensitive to her cefepime so no antibiotic change needed.   5/2: No air leak today she tolerated 2  hrs of spontaneous breathing with support today.      5/3: Second day with SBT and doing well now for 2 hrs. Slight bump in temperature and      WBC's but no obvious clinical infectious changed so watch closely.      5/4: Fever still from time to time. WBCs trending up. Chest tubes in place. Trach.       5/5: She clearly is better today she was sitting up in bed with open eyes and follows commands. She still has low grade fever but her WBC's are dropping. Cefepime dose increased yesterday for increased CNS coverage if necessary. She will probably need further thoracic surgery as mentioned by Dr. Ganser. The issue of what to do with her stimulator is still up in the air for now as it probably will need to be removed but she is nort a candidate for more major surgery at this time.    Interval History:  Past 24 hrs reviewed with staff.    Labs Reviewed, Medications Reviewed, Radiology Reviewed, Wound Reviewed, Fluids Reviewed and GI Nutrition Reviewed    Review of Systems:  Review of Systems    Unable to perform ROS: Intubated   Respiratory: Positive for shortness of breath.    Gastrointestinal: Positive for abdominal pain.       Physical Exam:  Physical Exam  Vitals and nursing note reviewed.   Constitutional:       General: She is not in acute distress.     Appearance: She is obese. She is not toxic-appearing.   HENT:      Head: Normocephalic and atraumatic.   Cardiovascular:      Rate and Rhythm: Normal rate and regular rhythm.   Pulmonary:      Effort: Pulmonary effort is normal. No respiratory distress.      Comments: BS coarse clear on left with squeak/snap on right side.  Abdominal:      General: Abdomen is flat. There is no distension.      Palpations: Abdomen is soft.      Tenderness: There is abdominal tenderness (?). There is no guarding or rebound.   Skin:     General: Skin is warm and dry.   Neurological:      Mental Status: She is alert.      Comments: Alert to voice and follows commands.         Labs:  Recent Results (from the past 24 hour(s))   CBC with Differential    Collection Time: 05/05/21  3:20 AM   Result Value Ref Range    WBC 15.1 (H) 4.8 - 10.8 K/uL    RBC 2.37 (L) 4.20 - 5.40 M/uL    Hemoglobin 7.1 (L) 12.0 - 16.0 g/dL    Hematocrit 23.1 (L) 37.0 - 47.0 %    MCV 97.5 81.4 - 97.8 fL    MCH 30.0 27.0 - 33.0 pg    MCHC 30.7 (L) 33.6 - 35.0 g/dL    RDW 61.8 (H) 35.9 - 50.0 fL    Platelet Count 326 164 - 446 K/uL    MPV 9.3 9.0 - 12.9 fL    Neutrophils-Polys 80.60 (H) 44.00 - 72.00 %    Lymphocytes 9.10 (L) 22.00 - 41.00 %    Monocytes 7.60 0.00 - 13.40 %    Eosinophils 0.00 0.00 - 6.90 %    Basophils 0.60 0.00 - 1.80 %    Immature Granulocytes 2.10 (H) 0.00 - 0.90 %    Nucleated RBC 0.00 /100 WBC    Neutrophils (Absolute) 12.20 (H) 2.00 - 7.15 K/uL    Lymphs (Absolute) 1.37 1.00 - 4.80 K/uL    Monos (Absolute) 1.15 (H) 0.00 - 0.85 K/uL    Eos (Absolute) 0.00 0.00 - 0.51 K/uL    Baso (Absolute) 0.09 0.00 - 0.12 K/uL    Immature Granulocytes (abs) 0.32 (H) 0.00 - 0.11 K/uL    NRBC  "(Absolute) 0.00 K/uL   Basic Metabolic Panel (BMP)    Collection Time: 05/05/21  3:20 AM   Result Value Ref Range    Sodium 135 135 - 145 mmol/L    Potassium 3.4 (L) 3.6 - 5.5 mmol/L    Chloride 102 96 - 112 mmol/L    Co2 26 20 - 33 mmol/L    Glucose 93 65 - 99 mg/dL    Bun 19 8 - 22 mg/dL    Creatinine <0.17 (L) 0.50 - 1.40 mg/dL    Calcium 8.2 (L) 8.5 - 10.5 mg/dL    Anion Gap 7.0 7.0 - 16.0   Magnesium    Collection Time: 05/05/21  3:20 AM   Result Value Ref Range    Magnesium 1.8 1.5 - 2.5 mg/dL   ESTIMATED GFR    Collection Time: 05/05/21  3:20 AM   Result Value Ref Range    GFR If African American >60 >60 mL/min/1.73 m 2    GFR If Non African American >60 >60 mL/min/1.73 m 2   COD - Adult (Type and Screen)    Collection Time: 05/05/21  3:20 AM   Result Value Ref Range    ABO Grouping Only A     Rh Grouping Only POS (A)     Antibody Screen-Cod NEG      Results     Procedure Component Value Units Date/Time    BLOOD CULTURE [[id number]] Collected: 05/04/21 1150    Order Status: Completed Specimen: Blood from Peripheral Updated: 05/05/21 0726     Significant Indicator NEG     Source BLD     Site PERIPHERAL     Culture Result No Growth  Note: Blood cultures are incubated for 5 days and  are monitored continuously.Positive blood cultures  are called to the RN and reported as soon as  they are identified.      Narrative:      Collected By:[id number] FERNANDA WISE  Per Hospital Policy: Only change Specimen Src: to \"Line\" if  specified by physician order.  Right Hand    BLOOD CULTURE [[id number]] Collected: 05/04/21 1200    Order Status: Completed Specimen: Blood from Peripheral Updated: 05/05/21 0726     Significant Indicator NEG     Source BLD     Site PERIPHERAL     Culture Result No Growth  Note: Blood cultures are incubated for 5 days and  are monitored continuously.Positive blood cultures  are called to the RN and reported as soon as  they are identified.      Narrative:      Collected By:[id number] FERNANDA " "AMANDEEP WISE  Per Hospital Policy: Only change Specimen Src: to \"Line\" if  specified by physician order.  Left AC    CULTURE TISSUE W/ GRM STAIN [[id number]]  (Abnormal)  (Susceptibility) Collected: 04/28/21 1711    Order Status: Completed Specimen: Tissue Updated: 05/01/21 1241     Significant Indicator POS     Source TISS     Site Right Pleural Debris     Culture Result -     Gram Stain Result Rare WBCs.  No organisms seen.       Culture Result Klebsiella pneumoniae  Light growth      Narrative:      Surgery Specimen    Susceptibility     Klebsiella pneumoniae (1)     Antibiotic Interpretation Microscan Method Status    Ceftriaxone Sensitive <=1 mcg/mL AUGUSTINA Final    Cefazolin Sensitive <=2 mcg/mL AUGUSTINA Final    Ciprofloxacin Sensitive <=0.25 mcg/mL AUGUSTINA Final    Cefepime Sensitive <=2 mcg/mL AUGUSTINA Final    Cefuroxime Sensitive <=4 mcg/mL AUGUSTINA Final    Ampicillin/sulbactam Sensitive 8/4 mcg/mL AUGUSTINA Final    Ertapenem Sensitive <=0.5 mcg/mL AUGUSTINA Final    Tobramycin Sensitive <=2 mcg/mL AUGUSTINA Final    Gentamicin Sensitive <=2 mcg/mL AUGUSTINA Final    Moxifloxacin Sensitive <=2 mcg/mL AUGUSTINA Final    Pip/Tazobactam Sensitive <=8 mcg/mL AUGUSTINA Final    Trimeth/Sulfa Resistant >2/38 mcg/mL AUGUSTINA Final    Tigecycline Sensitive <=2 mcg/mL AUGUSTINA Final                   Anaerobic Culture [[id number]] Collected: 04/28/21 1711    Order Status: Completed Specimen: Tissue Updated: 05/01/21 1241     Significant Indicator NEG     Source TISS     Site Right Pleural Debris     Culture Result No Anaerobes isolated.    Narrative:      Surgery Specimen    BLOOD CULTURE [[id number]] Collected: 04/25/21 1248    Order Status: Completed Specimen: Blood from Peripheral Updated: 04/30/21 1500     Significant Indicator NEG     Source BLD     Site PERIPHERAL     Culture Result No growth after 5 days of incubation.    Narrative:      Collected By:[id number] JAELYN TORRES  Per Hospital Policy: Only change Specimen Src: to \"Line\" if  specified by physician order.  Right " "Forearm/Arm    BLOOD CULTURE [[id number]] Collected: 21 1248    Order Status: Completed Specimen: Blood from Peripheral Updated: 21 1500     Significant Indicator NEG     Source BLD     Site PERIPHERAL     Culture Result No growth after 5 days of incubation.    Narrative:      Collected By:[id number] JAELYN TORRES  Per Hospital Policy: Only change Specimen Src: to \"Line\" if  specified by physician order.  Left Forearm/Arm    GRAM STAIN [[id number]] Collected: 21 1711    Order Status: Completed Specimen: Tissue Updated: 21 0011     Significant Indicator .     Source TISS     Site Right Pleural Debris     Gram Stain Result Rare WBCs.  No organisms seen.      Narrative:      Surgery Specimen        Hemodynamics:  Temp (24hrs), Av.1 °C (98.7 °F), Min:37 °C (98.6 °F), Max:37.1 °C (98.8 °F)  Temperature: 37 °C (98.6 °F)  Pulse  Av.8  Min: 40  Max: 149   Blood Pressure: (!) 83/39    PICC Single Lumen 21 Right Brachial (Active)   Site Assessment Clean;Dry;Intact 21 1000   Line Status Blood return noted;Flushed;Scrubbed the hub prior to access;Saline locked 21 1000   Line Secured at (cm) 0 cm 21 1507   Extremity Circumference (cm) 33 cm 21 1507   Dressing Type Biopatch;Occlusive;Transparent Film 21 1000   Dressing Status Clean;Dry;Intact 21 1000   Dressing Intervention N/A 21 0800   Dressing Change Due 21 1000   Date Primary Tubing Changed 21 0800   Date IV Connector(s) Changed 21 0800   NEXT Primary Tubing Change  21 0800   NEXT Secondary Tubing Change  21 0800   NEXT IV Connector(s) Change 21 0800   Line Necessity Assessed Antibiotic Therapy Greater than 7 Days 21 0800       CVC Triple Lumen 21 Left Subclavian (Active)   Consider Removal of Femoral Line Not Applicable 21 0800   Site Assessment Clean;Dry;Intact 21 1000   Lumen 1 " Status Blood return noted;Flushed;Normal saline locked;Scrubbed the hub prior to access 05/05/21 1000   Lumen 3 Status Infusing 05/05/21 1000   Lumen 2 Status Blood return noted;Flushed;Scrubbed the hub prior to access;Infusing 05/05/21 1000   Dressing Type Biopatch;Occlusive;Transparent Film 05/05/21 1000   Dressing Status Clean;Dry;Intact 05/05/21 1000   Dressing Intervention N/A 05/03/21 0800   Dressing Change Due 05/08/21 05/05/21 1000   Date Primary Tubing Changed 05/04/21 05/05/21 1000   Date Secondary Tubing Changed 05/05/21 05/05/21 1000   Date IV Connector(s) Changed 05/04/21 05/05/21 1000   NEXT Primary Tubing Change  05/08/21 05/04/21 0800   NEXT Secondary Tubing Change  05/05/21 05/04/21 0800   NEXT IV Connector(s) Change 05/04/21 05/03/21 0800   Waveform Not Applicable 05/05/21 1000   Line Necessity Assessed Lack of Peripheral Access;Antibiotic Therapy Greater than 7 Days 05/05/21 1000     Wound:  @WOUNDLDA(4)@     Fluids:  Intake/Output       05/03/21 0700 - 05/04/21 0659 05/04/21 0700 - 05/05/21 0659 05/05/21 0700 - 05/06/21 0659      [id number] 0516-3490 Total 8050-5659 5848-1263 Total 8251-9365 4098-7275 Total       Intake    P.O.  --  -- --  --  -- --  0  -- 0    P.O. -- -- -- -- -- -- 0 -- 0    I.V.  605.4  133.4 738.8  363.3  103 466.3  60.2  -- 60.2    Magnesium Sulfate Volume 50 -- 50 50 -- 50 12.1 -- 12.1    Precedex Volume -- -- -- 63.8 103 166.9 48.2 -- 48.2    Ketamine Volume 414 121.8 535.8 219.8 -- 219.8 -- -- --    Midazolam Volume 141.4 11.7 153 29.7 -- 29.7 -- -- --    Other  340  -- 340  --  100 100  --  -- --    Medications (PO/Enteral Liquids) 340 -- 340 -- 100 100 -- -- --    NG/GT  --  -- --  180  -- 180  180  -- 180    Intake (mL) (Enteral Tube 05/04/21 Cortrak - Gastric Right nare) -- -- -- 180 -- 180 180 -- 180    IV Piggyback  --  -- --  --  100 100  96.7  -- 96.7    Volume (mL) (cefepime (MAXIPIME) 2 g in  mL IVPB) -- -- -- -- 100 100 96.7 -- 96.7    Enteral  --  200 200   30  130 160  120  -- 120    Free Water / Tube Flush -- 200 200 30 130 160 120 -- 120    Total Intake 945.4 333.4 1278.8 573.3 433 1006.3 456.9 -- 456.9       Output    Urine  2470  900 3370  1690  -- 1690  220  -- 220    Number of Times Voided -- -- -- -- 1 x 1 x -- -- --    Urine Void (mL) -- -- -- 200 -- 200 -- -- --    Output (mL) (Urethral Catheter Temperature probe) -- -- -- -- -- -- 220 -- 220    Output (mL) ([REMOVED] Urethral Catheter) 2470 900 3370 1490 -- 1490 -- -- --    Drains  --  -- --  0  -- 0  0  -- 0    Residual Amount (mL) (Discarded) (Enteral Tube 05/04/21 Cortrak - Gastric Right nare) -- -- -- 0 -- 0 0 -- 0    Stool  --  -- --  --  -- --  --  -- --    Number of Times Stooled -- 1 x 1 x 5 x -- 5 x 1 x -- 1 x    Chest Tube  20  -- 20  80  -- 80  0  -- 0    Output (mL) (Chest Tube 2 Right Midaxillary) 10 -- 10 40 -- 40 0 -- 0    Output (mL) (Chest Tube 1 Right Midaxillary;Pleural) 10 -- 10 40 -- 40 0 -- 0    Total Output 2490 900 3390 1770 -- 1770 220 -- 220       Net I/O     -1544.7 -566.6 -2111.2 -1196.7 433 -763.7 236.9 -- 236.9        Weight: 82.8 kg (182 lb 8.7 oz)  GI/Nutrition:  Orders Placed This Encounter   Procedures   • Diet NPO     Standing Status:   Standing     Number of Occurrences:   1     Order Specific Question:   Restrict to:     Answer:   Strict [1]     Medications:  Current Facility-Administered Medications   Medication Last Admin   • oxyCODONE immediate release (ROXICODONE) tablet 20 mg     • potassium bicarbonate (KLYTE) effervescent tablet 50 mEq     • dexmedetomidine (PRECEDEX) 400 mcg/100mL NS premix infusion Stopped at 05/05/21 1139   • cefepime (MAXIPIME) 2 g in  mL IVPB Stopped at 05/05/21 1001   • HYDROmorphone (Dilaudid) injection 1-2 mg 2 mg at 05/05/21 1237   • furosemide (LASIX) injection 20 mg Stopped at 05/05/21 0600   • carvedilol (COREG) tablet 6.25 mg 6.25 mg at 05/05/21 0732   • midazolam (Versed) 2 MG/2ML injection 2 mg 2 mg at 05/05/21 0420   •  labetalol (NORMODYNE/TRANDATE) injection 10 mg 10 mg at 05/04/21 0226   • enoxaparin (LOVENOX) inj 40 mg 40 mg at 05/05/21 0511   • Pharmacy Consult: Enteral tube insertion - review meds/change route/product selection     • acetaminophen (Tylenol) tablet 650 mg 650 mg at 05/05/21 0212   • magnesium hydroxide (MILK OF MAGNESIA) suspension 30 mL      And   • senna-docusate (PERICOLACE or SENOKOT S) 8.6-50 MG per tablet 2 tablet Stopped at 05/04/21 1800    And   • polyethylene glycol/lytes (MIRALAX) PACKET 1 Packet      And   • bisacodyl (DULCOLAX) suppository 10 mg     • divalproex (DEPAKOTE SPRINKLE) capsule 500 mg 500 mg at 05/05/21 0511   • DULoxetine (CYMBALTA) capsule 60 mg 60 mg at 05/05/21 0511   • PARoxetine (PAXIL) tablet 10 mg 10 mg at 05/05/21 0511   • risperiDONE (RISPERDAL) tablet 2 mg 2 mg at 05/05/21 0511   • Respiratory Therapy Consult     • famotidine (PEPCID) tablet 20 mg 20 mg at 05/05/21 0511   • MD Alert...ICU Electrolyte Replacement per Pharmacy     • lidocaine (XYLOCAINE) 1 % injection 1-2 mL     • ipratropium-albuterol (DUONEB) nebulizer solution       Medical Decision Making, by Problem:  Active Hospital Problems    Diagnosis    • *Bacteremia due to methicillin susceptible Staphylococcus aureus (MSSA) [R78.81, B95.61]    • Agitation requiring sedation protocol [R45.1]    • 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]    • Acute respiratory failure with hypoxia (HCC) [J96.01]    • Acute bacterial endocarditis [I33.0]    • Empyema of right pleural space (HCC) [J86.9]    • Acute encephalopathy [G93.40]    • Fever [R50.9]    • Pleural effusion, left [J90]    • Atelectasis [J98.11]    • CSF pleocytosis [D72.9]    • Pneumonia [J18.9]    • Tachycardia [R00.0]    • Pseudotumor cerebri [G93.2]    • S/P  shunt [Z98.2]    • Prolonged Q-T interval on ECG [R94.31]    • History of vasculitis [Z86.79]    • History of complicated migraines [G43.109]    • Hyponatremia [E87.1]    • H/O: CVA (cerebrovascular accident) [Z86.73]    •  Chronic pain syndrome [G89.4]    • Thrombocytopenia (HCC) [D69.6]    • Spinal cord stimulator status [Z96.89]    • Goals of care, counseling/discussion [Z71.89]    • Abnormal movement [G25.9]    • Pulmonary abscess (HCC) [J85.2]    • Anasarca [R60.1]    • Thrombocytosis (HCC) [D47.3]    • GERD (gastroesophageal reflux disease) [K21.9]    • Septic shock (HCC) [A41.9, R65.21]    • Anemia [D64.9]    • Hypokalemia [E87.6]      Impression   -Severe sepsis  -Catheter related bloodstream infection due to infected port status post removal 4/12-staph aureus  -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       - Acute left empyema s/p chest tube placement 4/23  -Multiple acute septic pulmonary emboli bilaterally  -Acute bilateral pneumonia due to above     --Pulmonary edema  -Pseudotumor cerebri with underlying  shunt: possible arachnoiditis  -Chronic migraine headaches  -History of autoimmune vasculitis  -Elevated alkaline phosphatase & bilirubin  -Acute encephalopathy due to sepsis      - anemia due to above      - New secondary Klebsiella pneumoniae pneumonia (presumed VAP)     Plan   - Persistent fever likely attributable to empyema and multiple lung abscesses. Currently covered. Source control.  - Continue cefepime  - Vent management per pulmonology - SBT trials  - Tolerated the decortication. Cx NGTD. Another surgery likely will become necessary. Per thoracic surgery.  - Now off pressors   - No IV abx changes or further ID workup in the moment   - Continue to monitor closely     She clearly is better today she was sitting up in bed with open eyes and follows commands. She still has low grade fever but her WBC's are dropping. Cefepime dose increased yesterday for increased CNS coverage if necessary. She will probably need further thoracic surgery as mentioned by Dr. Ganser. The issue of what to do with  her stimulator is still up in the air for now as it probably will need to be removed but she is nort a candidate for more major surgery at this time.Case and treatment reviewed with patient(best possible), mother, micro and nursing> 35 min on floor in ICU with > 50% face to face view and 100% in direct patient care/counseling today.

## 2021-05-05 NOTE — PROGRESS NOTES
"Critical Care Progress Note    Date of admission  4/16/2021    Chief Complaint  50 y.o. female the past medical history of pseudotumor cerebri status post  shunt by Dr. Oh, chronic pain with history of placement of nerve stimulator, vasculitis, right anterior chest port for home IV meds with recurrent infection who presented 4/11/2021 with to [facility] with recurrent port infection and was initially treated with vancomycin and Zosyn and then changed to ceftaroline and subsequently switched to nafcillin when MSSA was identified.    Hospital Course  \"50 y.o. female the past medical history of pseudotumor cerebri status post  shunt by Dr. Oh, chronic pain with history of placement of nerve stimulator, vasculitis, right anterior chest port for home IV meds with recurrent infection who presented 4/11/2021 with to [facility] with recurrent port infection. Found to have MSSA bacteremia, endocarditis, septic pulmonary emboli/empyema/pneumatocele, and CSF pleocytosis concerning for  shunt involvement.  Seen by Dr. Oh, NSGY, at [facility] who did not recommend  shunt removal.  Seen by Dr. Vargas here for concern of fluid collection around her spinal stimulator and he recommended against removal. Remains intubated, encephalopathic.    4/16 admitted to ICU  4/17 VD 2, 2 FFP, pRBC overnight; new chest tube per gen surg  4/18 VD 3, TPa/Dornase with 1400 cc from R chest tube    4/26: R chest tube not functioning, d/c it.  broke his leg and going to OR today, so not available currently.  VD 11.  8/30%. RSBI immediately high on SBT attempt. Followed commands for RN  4/27: VD12. 8/30%. Not tolerating wean, high RSBI and tachycardic.   4/28: VD 13. VATS with Dr. Ganser.    4/29: VD 14. 8/30%. Still not tolerating wean d/t severe tachycardia just with SAT. Trialed precedex and she required fentanyl up to 600/hr to tolerate, so back on propofol. Chest tubes -40  4/30: VD 15. 8/30%. Try ketamine/versed as sedative. Severe " tachycardia and HTN with weaning down propofol. Plan for perc trach tomorrow. Chest tubes -40  5/1: perc trach. PICC placed. Chest tubes remain -40.  5/2: Able to spont well on sedation, but very tachy/HTN when sedation is turned down.  5/3 Versed weaned down to 3mg/min.   5/4 off versed and ketamine gtt      Interval Problem Update  Remains critically ill  Appears weak overall  Off ketamine gtt and versed gtt  Pain control with oxycodone 15 Q4H and dilaudid prn  Has been receiving dialudid 2mg Q Q2H, total 5 doses overnight  RASS 0, negative CAM ICU  On precedex gtt.   No acute events overnight.   On trach, on 30%/PEEP 8, tolerating spontaneous mode all day yesterday  Will do TP trials today.   Remains on two right chest tubes to suction -40. Surgery following and managing chest tubes  Tolerating tube feed at goal. New cortrak was placed yesterday due to clogging. Clog zapper didn't help  Good UOP, negative 874cc in the past 24 hours.   Negative 3.6L since admission  Creatinine <0.17, on lasix 20 IV Q12H  Tolerating mobility, level 2 (edge of bed)    Review of Systems  Review of Systems   Unable to perform ROS: Intubated        Vital Signs for last 24 hours   Temp:  [37.4 °C (99.3 °F)] 37.4 °C (99.3 °F)  Pulse:  [] 105  Resp:  [9-31] 16  BP: ()/(41-91) 88/47  SpO2:  [89 %-100 %] 97 %    Hemodynamic parameters for last 24 hours       Respiratory Information for the last 24 hours  Vent Mode: Spont  PEEP/CPAP: 8  P Support: 10  MAP: 9.8  Control VTE (exp VT): 428    Physical Exam   Physical Exam  Vitals and nursing note reviewed.   Constitutional:       Appearance: She is ill-appearing.      Interventions: She is sedated and intubated.   HENT:      Mouth/Throat:      Mouth: Mucous membranes are moist.   Neck:      Comments: Trach in place  8.0 Portex  Cardiovascular:      Rate and Rhythm: Regular rhythm. Tachycardia present.      Heart sounds: Murmur present.   Pulmonary:      Effort: She is intubated.       Comments: Improving respiratory movement of R chest    R chest tubes. No air leaks  Abdominal:      General: Bowel sounds are normal. There is no distension.      Tenderness: There is no abdominal tenderness. There is no guarding.   Musculoskeletal:      Right lower leg: Edema present.      Left lower leg: Edema present.      Comments: Generalized anasarca   Skin:     General: Skin is warm and dry.      Capillary Refill: Capillary refill takes 2 to 3 seconds.   Psychiatric:      Comments: Unable to assess         Medications  Current Facility-Administered Medications   Medication Dose Route Frequency Provider Last Rate Last Admin   • potassium bicarbonate (KLYTE) effervescent tablet 25 mEq  25 mEq Enteral Tube Once Jonathan Upton, D.O.   Stopped at 05/04/21 0715   • potassium bicarbonate (KLYTE) effervescent tablet 25 mEq  25 mEq Enteral Tube BID Jonathan Upton D.O.   25 mEq at 05/05/21 0511   • dexmedetomidine (PRECEDEX) 400 mcg/100mL NS premix infusion  0.1-1.5 mcg/kg/hr Intravenous Continuous Jonathan Upton D.O. 8.4 mL/hr at 05/04/21 2144 0.4 mcg/kg/hr at 05/04/21 2144   • cefepime (MAXIPIME) 2 g in  mL IVPB  2 g Intravenous Q8HRS Eric Lowery M.D.   Stopped at 05/05/21 0159   • oxyCODONE immediate-release (ROXICODONE) tablet 15 mg  15 mg Enteral Tube Q4HRS Jonathan Upton D.O.   15 mg at 05/05/21 0511   • HYDROmorphone (Dilaudid) injection 1-2 mg  1-2 mg Intravenous Q2HRS PRN Jeremy M Gonda, M.D.   2 mg at 05/05/21 0420   • furosemide (LASIX) injection 20 mg  20 mg Intravenous BID DIURETIC Leti Sun M.D.   Stopped at 05/05/21 0600   • carvedilol (COREG) tablet 6.25 mg  6.25 mg Enteral Tube BID WITH MEALS Leti Sun M.D.   6.25 mg at 05/04/21 1747   • midazolam (Versed) 2 MG/2ML injection 2 mg  2 mg Intravenous Q HOUR PRN Leti Sun M.D.   2 mg at 05/05/21 0420   • labetalol (NORMODYNE/TRANDATE) injection 10 mg  10 mg Intravenous Q4HRS PRN Leti Sun M.D.   10 mg at 05/04/21 9946   • enoxaparin  (LOVENOX) inj 40 mg  40 mg Subcutaneous DAILY Benoit Loya Jr., D.O.   40 mg at 05/05/21 0511   • Pharmacy Consult: Enteral tube insertion - review meds/change route/product selection  1 Each Other PHARMACY TO DOSE Elliott Salvador M.D.       • acetaminophen (Tylenol) tablet 650 mg  650 mg Enteral Tube Q4HRS PRN Elliott Salvador M.D.   650 mg at 05/05/21 0212   • magnesium hydroxide (MILK OF MAGNESIA) suspension 30 mL  30 mL Enteral Tube QDAY PRN Elliott Salvador M.D.        And   • senna-docusate (PERICOLACE or SENOKOT S) 8.6-50 MG per tablet 2 tablet  2 tablet Enteral Tube BID Elliott Salvador M.D.   Stopped at 05/04/21 1800    And   • polyethylene glycol/lytes (MIRALAX) PACKET 1 Packet  1 Packet Enteral Tube QDAY PRN Elliott Salvador M.D.        And   • bisacodyl (DULCOLAX) suppository 10 mg  10 mg Rectal QDAY PRN Elliott Salvador M.D.       • divalproex (DEPAKOTE SPRINKLE) capsule 500 mg  500 mg Enteral Tube Q8HRS Elliott Salvador M.D.   500 mg at 05/05/21 0511   • DULoxetine (CYMBALTA) capsule 60 mg  60 mg Enteral Tube BID Elliott Salvador M.D.   60 mg at 05/05/21 0511   • PARoxetine (PAXIL) tablet 10 mg  10 mg Enteral Tube QAM Elliott Salvador M.D.   10 mg at 05/05/21 0511   • risperiDONE (RISPERDAL) tablet 2 mg  2 mg Enteral Tube BID Elliott Salvador M.D.   2 mg at 05/05/21 0511   • Respiratory Therapy Consult   Nebulization Continuous RT Man Wahl M.D.       • famotidine (PEPCID) tablet 20 mg  20 mg Enteral Tube Q12HRS Man Wahl M.D.   20 mg at 05/05/21 0511   • MD Alert...ICU Electrolyte Replacement per Pharmacy   Other PHARMACY TO DOSE Man Wahl M.D.       • lidocaine (XYLOCAINE) 1 % injection 1-2 mL  1-2 mL Tracheal Tube Q30 MIN PRN Man B Richeson, M.D.       • ipratropium-albuterol (DUONEB) nebulizer solution  3 mL Nebulization Q2HRS PRN (RT) Man Wahl M.D.           Fluids    Intake/Output Summary (Last 24 hours) at 5/5/2021 0702  Last data filed at  5/5/2021 0416  Gross per 24 hour   Intake 1006.3 ml   Output 1770 ml   Net -763.7 ml       Laboratory          Recent Labs     05/03/21 0520 05/04/21 0455 05/05/21  0320   SODIUM 128* 136 135   POTASSIUM 3.5* 3.7 3.4*   CHLORIDE 101 104 102   CO2 22 24 26   BUN 15 17 19   CREATININE <0.17* <0.17* <0.17*   MAGNESIUM 1.6 1.7 1.8   PHOSPHORUS 2.8 3.0  --    CALCIUM 8.1* 8.2* 8.2*     Recent Labs     05/03/21 0520 05/03/21 2055 05/04/21 0455 05/05/21 0320   ALTSGPT 30  --   --   --    ASTSGOT 29  --   --   --    ALKPHOSPHAT 374*  --   --   --    TBILIRUBIN 0.7  --   --   --    PREALBUMIN  --  16.9*  --   --    GLUCOSE 93  --  108* 93     Recent Labs     05/03/21 0520 05/04/21 0455 05/05/21 0320   WBC 18.0* 21.5* 15.1*   NEUTSPOLYS 78.90* 84.60* 80.60*   LYMPHOCYTES 8.00* 5.60* 9.10*   MONOCYTES 8.40 6.60 7.60   EOSINOPHILS 0.00 0.00 0.00   BASOPHILS 0.70 0.30 0.60   ASTSGOT 29  --   --    ALTSGPT 30  --   --    ALKPHOSPHAT 374*  --   --    TBILIRUBIN 0.7  --   --      Recent Labs     05/03/21 0520 05/04/21 0455 05/05/21 0320   RBC 2.57* 2.73* 2.37*   HEMOGLOBIN 7.8* 8.2* 7.1*   HEMATOCRIT 24.2* 26.3* 23.1*   PLATELETCT 383 370 326       Imaging  X-Ray:  I have personally reviewed the images and compared with prior images.  4/23 MICAH: large vegetations in MV and TV    Microbiology:  4/16 BCx 2/2 MSSA  4/17 BCx 2/2: negative  4/20 Trach aspirate: K. pneumonia  4/24 BAL: K. Pneumonia >100k cfu/ml.   4/25 BCx 2/2: negative  4/28 Pleural tissue (intraop OR specimen): K. Pneumonia. Pansensitive  5/4 BCx 2/2: pending, negative to date    Assessment/Plan  * Bacteremia due to methicillin susceptible Staphylococcus aureus (MSSA)- (present on admission)  Assessment & Plan  Source presumed right anterior chest line/port with endocarditis  Multiple sources for MSSA including MV/TV vegetation, port, spinal stimulator/ shunt  Port has been removed  Last positive blood cultures were 4/17  Pt appeared to be on nafcillin  from 4/16-4/23. Pt did receive MSSA therapy when she was in [facility].   Would touch base with ID regarding optimal therapy of MSSA bacteremia.           Agitation requiring sedation protocol  Assessment & Plan  Patient was on propofol/fentanyl for approximately 2 weeks  When sedation is weaned, she becomes extremely tachycardic and hypotensive  When fentanyl gtt was d/c'ed (even with oxycodone 10mg q4) her pupils became quite dilated, suggesting a component of opiate withdrawal  No agitation as we were weaning off versed gtt and ketamine gtt.   Off versed and ketamine gtt today 5/4  Increasing oxycodone to 20 Q4H and dilaudid prn.       Trapped lung  Assessment & Plan  Decortication 4/28  Continue chest tubes to suction and hope lungs expand  R chest is expanding somewhat.  There is now visible respiratory movement in the R chest and some improved expansion on CXR    Empyema of right pleural space (HCC)- (present on admission)  Assessment & Plan  R empyema due to septic pulmonary emboli  4/16 Chest tube placement at [facility]  4/18 Upsized by Dr Benedict  4/18-4/20 Received TPA/dornase.  Stopped b/c Hgb dropped requiring transfusion  4/26 was discontinued due to stopped functioning   4/28 s/p right VATS and decortication. Cx grew of Klebsiella pneumonia  May need another intervention in future, per Dr. Ganser  Has two chest tubes on the right and connect to -40 per surgery        Acute bacterial endocarditis- (present on admission)  Assessment & Plan  MSSA bacteremia at OSH and here on 4/16. First negative blood cultures were on 4/17  Infected port removed at [facility]   shunt and spinal stimulator could be seeded, NSGY has consulted on both and don't recommend removal at admission due to HD instability.   MV and TV vegetations with septic pulmonary emboli  [facility]y ID Dr Omer was following.   K. Pneumonia empyema - on cefepime  Deemed not surgical candidate - No evidence based surgical indications (valvular CHF, difficult  pathogen, etc) so no cardiovascular surgery consult is necessary at this time per prior note    Plan:   On cefepime for K. pneumonia  Received nafcilin from 4/16-4/22.   ID following        Acute respiratory failure with hypoxia (HCC)- (present on admission)  Assessment & Plan  4/15 Intubated for acute hypoxic respiratory failure at [facility] due to hydro/pneumo, trapped lung, lung abscesses  4/16 transferred to [facility]   4/16-4/28 Difficult to wean due to ongoing right loculated hydropneumothorax and lung abscesses, empyema. S/p 2 chest tubes  4/28 S/p right lung decortication by Dr. Ganser  5/1 s/p percutaneous tracheostomy    Concern of difficult to wean off sedation given prolonged sedation with prolonged intubation.   Pt was on versed gtt, scheduled oxycodone and dilaudid IV prn  4/30 ketamine gtt was started and planning to bridge and treat pain while weaning off versed    Plan:   Daily SAT. On precedex gtt.  Goal RASS +1 to -1  Will start TP trials.   On oxycodone scheduled with dilaudid prn  Will increase oxycodone to 20 Q4H, dialudid 1-2mg IVQ2H prn  Mobility level 1 at least, should attempt level 2 today  Monitor UOP  Continue diuresis with lasix 20 Q12H. Goal even to minimally negative fluid balance.   Tolerating tube feed at goal  GI was consulted, Dr. Moseley for possible PEG placement. Given pt appears to slowly recover, would like to give a chance for patient to recover before deciding for PEG. Will hold PEG placement. .       Acute encephalopathy- (present on admission)  Assessment & Plan  Likely multifactorial given CSF pleocytosis concerning of likely seeded spinal stimulator/ shunt, sedation  Pt has hx of  shunt for pseudotumor cerebri  Concern of autoimmune cerebritis was entertained but this was not confirmed.   Not candidate for MRI d/t spinal stimulator  EEG with encephalopathy and no seizures  Minimize sedation as able  Daily SAT  Overall improving      Pleural effusion,  left  Assessment & Plan  Loculated fluid  Probably parapneumonic fluid from abscesses  CT guided chest tube placed 4/23, fluid sent  Lymph predominant, NGTD  TPA/dornase one dose on 4/24.  Hold d/t dropping Hgb  CT chest 4/25 with resolution of fluid  CT clamped 4/29-no significant accumulation of fluid so d/c 4/30    Fever  Assessment & Plan  Persistent low grade fever - likely due to suboptimal source control given her native valve IE in MV and TV, existing  shunt/spinal stimulator. In addition, R pleural space has trapped lung so impossible to completely clear/get source control  Blood cultures repeated 5/5  Will need to revisit with NSGY regarding removal of spinal stimulator  On cefepime  I did speak with ID, Dr. Lowery yesterday regarding adjusting antibiotics (adding nafcillin for complicated MSSA) and will review.   Attempted to call ID, Dr. Omer, but no answer.         Pneumonia  Assessment & Plan  MSSA septic emboli with empyema   VAE  BAL 4/20 klebsiella pneumonia  BAL 4/24 still heavy growth of klebsiella  4/28 OR pleural fluid specimen growing klebsiella.   On cefepime    CSF pleocytosis- (present on admission)  Assessment & Plan  Status post lumbar puncture 4/13  WBC 53, 82% polys, Glucose 47, protein 97 Gram stain negative and culture negative at [facility]  Neurosurgery aware,  shunt in place  MRI brain requested by neurosurgery at [facility], not candidate d/t stimulator  Repeat ct no embolic lesions      Acute blood loss anemia  Assessment & Plan  Resolved  Hgb stable in 7-8s      S/P  shunt- (present on admission)  Assessment & Plan  History of pseudotumor cerebri  History of  shunt by Dr. Oh  Could be seeded by MSSA, Dr. Oh consulted at [facility], recommended MRI but unable given her spinal stimulator  Need to revisit this when patient is more stable      Goals of care, counseling/discussion  Assessment & Plan  Palliative is consulting  Dr. Sun spoke with Benoit on 4/27. I explained my concern  that even if Enedelia were to survive this her QOL would likely be quite a bit worse.  She won't be able to have another port and was receiving IV meds for HA 3-4 times weekly.  She also could have worse headaches after this episode of meningitis.  He asked if she could have brain damage, and I explained that was possible but hard to know right now.  He says part of him thinks Enedelia would want to keep fighting and part of him thinks she would say to stop aggressive care because her QOL was already very limited by her headaches. Continue aggressive care for now.    5/4 spoke with  who's at bedside and updated him about patient's critical condition. All questions were answered.     Spinal cord stimulator status  Assessment & Plan  Patient's stimulator is Nuvectra. It is FDA approved as MRI compatible, but the company has gone out of business, so there is no support team to assist with MRI.  Thus, if MRI were performed, our radiologists would need to protocol it correctly to manage the stimulator. The former rep from [employer] is Andre, [phone].  Information obtained from Dr. Mahoney's office, [phone].    Per  (5/4/2021), it is not MRI compatible unfortunately.     Pulmonary abscess (HCC)  Assessment & Plan  Septic emboli from TV endocarditis    Septic shock (HCC)- (present on admission)  Assessment & Plan  Shock resolved    Abnormal LFTs  Assessment & Plan  Chronic alk phos elevation  GGT high c/w liver source  Outpatient workup    Chronic pain syndrome- (present on admission)  Assessment & Plan  Chronic back pain and intractable headaches  History of nerve stimulator   Dr Vargas consulted at admission, no plan to remove stimulator due to her clinical instability     VTE:  lovenox  Ulcer: H2 Antagonist  Lines: Central Line  Ongoing indication addressed and Lopez Catheter  Ongoing indication addressed   Two chest tubes on the right    I have performed a physical exam and reviewed and updated ROS and  Plan today (5/5/2021). In review of yesterday's note (5/4/2021), there are no changes except as documented above.       Discussed patient condition and risk of morbidity and/or mortality with Family, RN, RT, Pharmacy, Dietary, Code status disscussed and Charge nurse / hot rounds.      The patient remains critically ill requiring mechanical ventilation, difficulty weaning from vent, complicated pleural space.  Critical care time = 50 minutes in directly providing and coordinating critical care and extensive data review.  No time overlap and excludes procedures

## 2021-05-06 ENCOUNTER — APPOINTMENT (OUTPATIENT)
Dept: RADIOLOGY | Facility: MEDICAL CENTER | Age: 51
DRG: 003 | End: 2021-05-06
Attending: INTERNAL MEDICINE
Payer: MEDICARE

## 2021-05-06 LAB
ANION GAP SERPL CALC-SCNC: 6 MMOL/L (ref 7–16)
BASOPHILS # BLD AUTO: 0.5 % (ref 0–1.8)
BASOPHILS # BLD: 0.09 K/UL (ref 0–0.12)
BUN SERPL-MCNC: 17 MG/DL (ref 8–22)
CALCIUM SERPL-MCNC: 8.1 MG/DL (ref 8.5–10.5)
CHLORIDE SERPL-SCNC: 103 MMOL/L (ref 96–112)
CO2 SERPL-SCNC: 29 MMOL/L (ref 20–33)
CREAT SERPL-MCNC: 0.18 MG/DL (ref 0.5–1.4)
EOSINOPHIL # BLD AUTO: 0 K/UL (ref 0–0.51)
EOSINOPHIL NFR BLD: 0 % (ref 0–6.9)
ERYTHROCYTE [DISTWIDTH] IN BLOOD BY AUTOMATED COUNT: 70.6 FL (ref 35.9–50)
GLUCOSE SERPL-MCNC: 108 MG/DL (ref 65–99)
HCT VFR BLD AUTO: 27.5 % (ref 37–47)
HGB BLD-MCNC: 8.6 G/DL (ref 12–16)
IMM GRANULOCYTES # BLD AUTO: 0.23 K/UL (ref 0–0.11)
IMM GRANULOCYTES NFR BLD AUTO: 1.2 % (ref 0–0.9)
LYMPHOCYTES # BLD AUTO: 1.32 K/UL (ref 1–4.8)
LYMPHOCYTES NFR BLD: 6.9 % (ref 22–41)
MAGNESIUM SERPL-MCNC: 1.7 MG/DL (ref 1.5–2.5)
MCH RBC QN AUTO: 29.5 PG (ref 27–33)
MCHC RBC AUTO-ENTMCNC: 31.3 G/DL (ref 33.6–35)
MCV RBC AUTO: 94.2 FL (ref 81.4–97.8)
MONOCYTES # BLD AUTO: 1.43 K/UL (ref 0–0.85)
MONOCYTES NFR BLD AUTO: 7.5 % (ref 0–13.4)
NEUTROPHILS # BLD AUTO: 16.11 K/UL (ref 2–7.15)
NEUTROPHILS NFR BLD: 83.9 % (ref 44–72)
NRBC # BLD AUTO: 0 K/UL
NRBC BLD-RTO: 0 /100 WBC
PHOSPHATE SERPL-MCNC: 3.1 MG/DL (ref 2.5–4.5)
PLATELET # BLD AUTO: 337 K/UL (ref 164–446)
PMV BLD AUTO: 9.1 FL (ref 9–12.9)
POTASSIUM SERPL-SCNC: 3.9 MMOL/L (ref 3.6–5.5)
RBC # BLD AUTO: 2.92 M/UL (ref 4.2–5.4)
SODIUM SERPL-SCNC: 138 MMOL/L (ref 135–145)
WBC # BLD AUTO: 19.2 K/UL (ref 4.8–10.8)

## 2021-05-06 PROCEDURE — 700111 HCHG RX REV CODE 636 W/ 250 OVERRIDE (IP): Performed by: INTERNAL MEDICINE

## 2021-05-06 PROCEDURE — 700105 HCHG RX REV CODE 258: Performed by: INTERNAL MEDICINE

## 2021-05-06 PROCEDURE — 94003 VENT MGMT INPAT SUBQ DAY: CPT

## 2021-05-06 PROCEDURE — 84100 ASSAY OF PHOSPHORUS: CPT

## 2021-05-06 PROCEDURE — 83735 ASSAY OF MAGNESIUM: CPT

## 2021-05-06 PROCEDURE — A9270 NON-COVERED ITEM OR SERVICE: HCPCS | Performed by: INTERNAL MEDICINE

## 2021-05-06 PROCEDURE — 700102 HCHG RX REV CODE 250 W/ 637 OVERRIDE(OP): Performed by: INTERNAL MEDICINE

## 2021-05-06 PROCEDURE — 71045 X-RAY EXAM CHEST 1 VIEW: CPT

## 2021-05-06 PROCEDURE — 85025 COMPLETE CBC W/AUTO DIFF WBC: CPT

## 2021-05-06 PROCEDURE — 97163 PT EVAL HIGH COMPLEX 45 MIN: CPT

## 2021-05-06 PROCEDURE — 700101 HCHG RX REV CODE 250: Performed by: INTERNAL MEDICINE

## 2021-05-06 PROCEDURE — 99291 CRITICAL CARE FIRST HOUR: CPT | Performed by: INTERNAL MEDICINE

## 2021-05-06 PROCEDURE — 94799 UNLISTED PULMONARY SVC/PX: CPT

## 2021-05-06 PROCEDURE — 770022 HCHG ROOM/CARE - ICU (200)

## 2021-05-06 PROCEDURE — 80048 BASIC METABOLIC PNL TOTAL CA: CPT

## 2021-05-06 PROCEDURE — 94640 AIRWAY INHALATION TREATMENT: CPT

## 2021-05-06 RX ORDER — MAGNESIUM SULFATE HEPTAHYDRATE 40 MG/ML
2 INJECTION, SOLUTION INTRAVENOUS ONCE
Status: COMPLETED | OUTPATIENT
Start: 2021-05-06 | End: 2021-05-06

## 2021-05-06 RX ORDER — GABAPENTIN 100 MG/1
200 CAPSULE ORAL EVERY 8 HOURS
Status: DISCONTINUED | OUTPATIENT
Start: 2021-05-06 | End: 2021-05-08

## 2021-05-06 RX ORDER — METHADONE HYDROCHLORIDE 10 MG/ML
5 CONCENTRATE ORAL EVERY 8 HOURS
Status: DISCONTINUED | OUTPATIENT
Start: 2021-05-06 | End: 2021-05-11

## 2021-05-06 RX ADMIN — FAMOTIDINE 20 MG: 20 TABLET ORAL at 17:11

## 2021-05-06 RX ADMIN — CEFEPIME 2 G: 2 INJECTION, POWDER, FOR SOLUTION INTRAVENOUS at 17:11

## 2021-05-06 RX ADMIN — FUROSEMIDE 20 MG: 10 INJECTION, SOLUTION INTRAMUSCULAR; INTRAVENOUS at 15:17

## 2021-05-06 RX ADMIN — OXYCODONE HYDROCHLORIDE 20 MG: 10 TABLET ORAL at 01:53

## 2021-05-06 RX ADMIN — GABAPENTIN 200 MG: 100 CAPSULE ORAL at 21:53

## 2021-05-06 RX ADMIN — ACETAMINOPHEN 650 MG: 325 TABLET, FILM COATED ORAL at 01:53

## 2021-05-06 RX ADMIN — PAROXETINE HYDROCHLORIDE 10 MG: 20 TABLET, FILM COATED ORAL at 05:21

## 2021-05-06 RX ADMIN — OXYCODONE HYDROCHLORIDE 20 MG: 10 TABLET ORAL at 21:52

## 2021-05-06 RX ADMIN — OXYCODONE HYDROCHLORIDE 20 MG: 10 TABLET ORAL at 05:19

## 2021-05-06 RX ADMIN — ENOXAPARIN SODIUM 40 MG: 40 INJECTION SUBCUTANEOUS at 05:20

## 2021-05-06 RX ADMIN — FAMOTIDINE 20 MG: 20 TABLET ORAL at 05:19

## 2021-05-06 RX ADMIN — POTASSIUM BICARBONATE 50 MEQ: 978 TABLET, EFFERVESCENT ORAL at 17:12

## 2021-05-06 RX ADMIN — GABAPENTIN 200 MG: 100 CAPSULE ORAL at 13:10

## 2021-05-06 RX ADMIN — DEXMEDETOMIDINE 0.5 MCG/KG/HR: 200 INJECTION, SOLUTION INTRAVENOUS at 05:20

## 2021-05-06 RX ADMIN — DIVALPROEX SODIUM 500 MG: 125 CAPSULE ORAL at 13:10

## 2021-05-06 RX ADMIN — DIVALPROEX SODIUM 500 MG: 125 CAPSULE ORAL at 21:53

## 2021-05-06 RX ADMIN — HYDROMORPHONE HYDROCHLORIDE 2 MG: 1 INJECTION, SOLUTION INTRAMUSCULAR; INTRAVENOUS; SUBCUTANEOUS at 03:08

## 2021-05-06 RX ADMIN — DEXMEDETOMIDINE 0.4 MCG/KG/HR: 200 INJECTION, SOLUTION INTRAVENOUS at 02:44

## 2021-05-06 RX ADMIN — RISPERIDONE 2 MG: 1 TABLET, FILM COATED ORAL at 05:20

## 2021-05-06 RX ADMIN — OXYCODONE HYDROCHLORIDE 20 MG: 10 TABLET ORAL at 13:10

## 2021-05-06 RX ADMIN — TRAMADOL HYDROCHLORIDE 50 MG: 50 TABLET ORAL at 22:27

## 2021-05-06 RX ADMIN — MAGNESIUM SULFATE 2 G: 2 INJECTION INTRAVENOUS at 09:28

## 2021-05-06 RX ADMIN — DEXMEDETOMIDINE 0.3 MCG/KG/HR: 200 INJECTION, SOLUTION INTRAVENOUS at 21:34

## 2021-05-06 RX ADMIN — OXYCODONE HYDROCHLORIDE 20 MG: 10 TABLET ORAL at 09:19

## 2021-05-06 RX ADMIN — CARVEDILOL 6.25 MG: 6.25 TABLET, FILM COATED ORAL at 09:19

## 2021-05-06 RX ADMIN — METHADONE HYDROCHLORIDE 5 MG: 10 CONCENTRATE ORAL at 14:06

## 2021-05-06 RX ADMIN — RISPERIDONE 2 MG: 1 TABLET, FILM COATED ORAL at 17:11

## 2021-05-06 RX ADMIN — OXYCODONE HYDROCHLORIDE 20 MG: 10 TABLET ORAL at 17:11

## 2021-05-06 RX ADMIN — DULOXETINE HYDROCHLORIDE 60 MG: 60 CAPSULE, DELAYED RELEASE ORAL at 17:11

## 2021-05-06 RX ADMIN — HYDROMORPHONE HYDROCHLORIDE 2 MG: 1 INJECTION, SOLUTION INTRAMUSCULAR; INTRAVENOUS; SUBCUTANEOUS at 01:07

## 2021-05-06 RX ADMIN — CEFEPIME 2 G: 2 INJECTION, POWDER, FOR SOLUTION INTRAVENOUS at 01:53

## 2021-05-06 RX ADMIN — HYDROMORPHONE HYDROCHLORIDE 2 MG: 1 INJECTION, SOLUTION INTRAMUSCULAR; INTRAVENOUS; SUBCUTANEOUS at 20:02

## 2021-05-06 RX ADMIN — FUROSEMIDE 20 MG: 10 INJECTION, SOLUTION INTRAMUSCULAR; INTRAVENOUS at 05:20

## 2021-05-06 RX ADMIN — DIVALPROEX SODIUM 500 MG: 125 CAPSULE ORAL at 05:19

## 2021-05-06 RX ADMIN — CEFEPIME 2 G: 2 INJECTION, POWDER, FOR SOLUTION INTRAVENOUS at 09:19

## 2021-05-06 RX ADMIN — CARVEDILOL 6.25 MG: 6.25 TABLET, FILM COATED ORAL at 17:11

## 2021-05-06 RX ADMIN — HYDROMORPHONE HYDROCHLORIDE 1 MG: 1 INJECTION, SOLUTION INTRAMUSCULAR; INTRAVENOUS; SUBCUTANEOUS at 05:21

## 2021-05-06 RX ADMIN — HYDROMORPHONE HYDROCHLORIDE 2 MG: 1 INJECTION, SOLUTION INTRAMUSCULAR; INTRAVENOUS; SUBCUTANEOUS at 15:17

## 2021-05-06 RX ADMIN — HYDROMORPHONE HYDROCHLORIDE 2 MG: 1 INJECTION, SOLUTION INTRAMUSCULAR; INTRAVENOUS; SUBCUTANEOUS at 11:19

## 2021-05-06 RX ADMIN — METHADONE HYDROCHLORIDE 5 MG: 10 CONCENTRATE ORAL at 21:52

## 2021-05-06 RX ADMIN — DOCUSATE SODIUM 50 MG AND SENNOSIDES 8.6 MG 2 TABLET: 8.6; 5 TABLET, FILM COATED ORAL at 17:11

## 2021-05-06 RX ADMIN — DULOXETINE HYDROCHLORIDE 60 MG: 60 CAPSULE, DELAYED RELEASE ORAL at 05:19

## 2021-05-06 RX ADMIN — POTASSIUM BICARBONATE 50 MEQ: 978 TABLET, EFFERVESCENT ORAL at 05:20

## 2021-05-06 RX ADMIN — HYDROMORPHONE HYDROCHLORIDE 1 MG: 1 INJECTION, SOLUTION INTRAMUSCULAR; INTRAVENOUS; SUBCUTANEOUS at 07:20

## 2021-05-06 ASSESSMENT — FIBROSIS 4 INDEX
FIB4 SCORE: 0.75

## 2021-05-06 ASSESSMENT — COGNITIVE AND FUNCTIONAL STATUS - GENERAL
SUGGESTED CMS G CODE MODIFIER MOBILITY: CL
MOBILITY SCORE: 11
STANDING UP FROM CHAIR USING ARMS: A LITTLE
CLIMB 3 TO 5 STEPS WITH RAILING: A LOT
TURNING FROM BACK TO SIDE WHILE IN FLAT BAD: UNABLE
MOVING TO AND FROM BED TO CHAIR: UNABLE
WALKING IN HOSPITAL ROOM: A LITTLE
MOVING FROM LYING ON BACK TO SITTING ON SIDE OF FLAT BED: UNABLE

## 2021-05-06 ASSESSMENT — GAIT ASSESSMENTS: GAIT LEVEL OF ASSIST: UNABLE TO PARTICIPATE

## 2021-05-06 ASSESSMENT — PAIN DESCRIPTION - PAIN TYPE
TYPE: ACUTE PAIN
TYPE: ACUTE PAIN

## 2021-05-06 NOTE — PROGRESS NOTES
Up in chair  Alert  Chest tubes draining dark old blood, no air leak  Recommend repeat CT chest early next week to reassess.

## 2021-05-06 NOTE — PROGRESS NOTES
"Critical Care Progress Note    Date of admission  4/16/2021    Chief Complaint  50 y.o. female the past medical history of pseudotumor cerebri status post  shunt by Dr. Oh, chronic pain with history of placement of nerve stimulator, vasculitis, right anterior chest port for home IV meds with recurrent infection who presented 4/11/2021 with to [facility] with recurrent port infection and was initially treated with vancomycin and Zosyn and then changed to ceftaroline and subsequently switched to nafcillin when MSSA was identified.    Hospital Course  \"50 y.o. female the past medical history of pseudotumor cerebri status post  shunt by Dr. Oh, chronic pain with history of placement of nerve stimulator, vasculitis, right anterior chest port for home IV meds with recurrent infection who presented 4/11/2021 with to [facility] with recurrent port infection. Found to have MSSA bacteremia, endocarditis, septic pulmonary emboli/empyema/pneumatocele, and CSF pleocytosis concerning for  shunt involvement.  Seen by Dr. Oh, NSGY, at [facility] who did not recommend  shunt removal.  Seen by Dr. Vargas here for concern of fluid collection around her spinal stimulator and he recommended against removal. Remains intubated, encephalopathic.    4/16 admitted to ICU  4/17 VD 2, 2 FFP, pRBC overnight; new chest tube per gen surg  4/18 VD 3, TPa/Dornase with 1400 cc from R chest tube    4/26: R chest tube not functioning, d/c it.  broke his leg and going to OR today, so not available currently.  VD 11.  8/30%. RSBI immediately high on SBT attempt. Followed commands for RN  4/27: VD12. 8/30%. Not tolerating wean, high RSBI and tachycardic.   4/28: VD 13. VATS with Dr. Ganser.    4/29: VD 14. 8/30%. Still not tolerating wean d/t severe tachycardia just with SAT. Trialed precedex and she required fentanyl up to 600/hr to tolerate, so back on propofol. Chest tubes -40  4/30: VD 15. 8/30%. Try ketamine/versed as sedative. Severe " tachycardia and HTN with weaning down propofol. Plan for perc trach tomorrow. Chest tubes -40  5/1: perc trach. PICC placed. Chest tubes remain -40.  5/2: Able to spont well on sedation, but very tachy/HTN when sedation is turned down.  5/3 Versed weaned down to 3mg/min.   5/4 off versed and ketamine gtt  5/5: trials of SBT w/ trach    Interval Problem Update  Neuro: anxious dex 0.5, pain poorly controlled  HR: snr to tach  SBP: 's  Tmax: 38  GI: cortrax at goal, BM 5/5  UOP: good  Lines: nevarez, ct x2, picc line  Resp: vent day 21, trach 5 t piece 4 hrs, at night 8/8 TV's 400ml, secretions min  Vte: lovenox  PPI/H2:pepcid  Antibx: cefepime  Nafcillin 4/16-4/22    MSSA bacteremia ID following  Mag replace, k scheduled lasix 20mg BID  Trap lung   Repeat CT scans  Gabapentin 200mg TID   Methadone 10mg oral TID  Mobility to chair today    ROS headache, poorly controlled pain, + nausea  Spoke with ID   Thoracic surgery following      Review of Systems  Review of Systems   Unable to perform ROS: Intubated        Vital Signs for last 24 hours   Temp:  [37 °C (98.6 °F)-37.3 °C (99.1 °F)] 37 °C (98.6 °F)  Pulse:  [] 114  Resp:  [14-30] 22  BP: ()/(44-90) 105/56  SpO2:  [93 %-99 %] 97 %    Hemodynamic parameters for last 24 hours       Respiratory Information for the last 24 hours  Vent Mode: Spont  PEEP/CPAP: 8  P Support: 5  MAP: 9.6  Control VTE (exp VT): 376    Physical Exam   Physical Exam  Vitals and nursing note reviewed.   Constitutional:       Appearance: She is ill-appearing.      Interventions: She is sedated and intubated.      Comments: Chronically ill appearing, trach in place   HENT:      Head:      Comments: Wasting temples     Mouth/Throat:      Mouth: Mucous membranes are dry.   Eyes:      Pupils: Pupils are equal, round, and reactive to light.   Neck:      Comments: Trach in place  8.0 Portex  Cardiovascular:      Rate and Rhythm: Regular rhythm. Tachycardia present.      Heart sounds:  Murmur present.   Pulmonary:      Effort: She is intubated.      Comments: Some air movement to right chest, clear left without wheezing or rales.  Abdominal:      General: Bowel sounds are normal. There is no distension.      Tenderness: There is no abdominal tenderness. There is no guarding.   Musculoskeletal:      Cervical back: No rigidity or tenderness.      Right lower leg: Edema present.      Left lower leg: Edema present.      Comments: Edema to hands and feet   Skin:     General: Skin is warm and dry.      Capillary Refill: Capillary refill takes 2 to 3 seconds.      Coloration: Skin is pale.   Neurological:      Comments: Awake able to mouth words, shakes head yes and no, ext weak bilateral moves all extremities, no numbness to extremities.    Psychiatric:      Comments: Unable to assess         Medications  Current Facility-Administered Medications   Medication Dose Route Frequency Provider Last Rate Last Admin   • gabapentin (NEURONTIN) capsule 200 mg  200 mg Enteral Tube Q8HRS Juventino Lozano M.D.   200 mg at 05/06/21 1310   • methadone (DOLOPHINE) 10 MG/ML solution 5 mg  5 mg Enteral Tube Q8HRS Juventino Lozano M.D.   5 mg at 05/06/21 1406   • oxyCODONE immediate release (ROXICODONE) tablet 20 mg  20 mg Enteral Tube Q4HRS Jonathan Upton D.O.   20 mg at 05/06/21 1310   • potassium bicarbonate (KLYTE) effervescent tablet 50 mEq  50 mEq Enteral Tube BID LEYLA Caban.O.   50 mEq at 05/06/21 0520   • traMADol (ULTRAM) 50 MG tablet 50 mg  50 mg Enteral Tube Q6HRS PRN Jonathan Upton D.O.   50 mg at 05/05/21 1707   • dexmedetomidine (PRECEDEX) 400 mcg/100mL NS premix infusion  0.1-1.5 mcg/kg/hr Intravenous Continuous Jonathan Upton D.O. 10.5 mL/hr at 05/06/21 0520 0.5 mcg/kg/hr at 05/06/21 0520   • cefepime (MAXIPIME) 2 g in  mL IVPB  2 g Intravenous Q8HRS Eric Lowery M.D.   Stopped at 05/06/21 0949   • HYDROmorphone (Dilaudid) injection 1-2 mg  1-2 mg Intravenous Q2HRS PRN Jeremy M Gonda, M.D.   2 mg  at 05/06/21 1119   • furosemide (LASIX) injection 20 mg  20 mg Intravenous BID DIURETIC Leti Sun M.D.   20 mg at 05/06/21 0520   • carvedilol (COREG) tablet 6.25 mg  6.25 mg Enteral Tube BID WITH MEALS Leti Sun M.D.   6.25 mg at 05/06/21 0919   • midazolam (Versed) 2 MG/2ML injection 2 mg  2 mg Intravenous Q HOUR PRN Leti Sun M.D.   2 mg at 05/05/21 0420   • labetalol (NORMODYNE/TRANDATE) injection 10 mg  10 mg Intravenous Q4HRS PRN Leti Sun M.D.   10 mg at 05/04/21 0226   • enoxaparin (LOVENOX) inj 40 mg  40 mg Subcutaneous DAILY Benoit Loya Jr., D.O.   40 mg at 05/06/21 0520   • Pharmacy Consult: Enteral tube insertion - review meds/change route/product selection  1 Each Other PHARMACY TO DOSE Elliott Salvador M.D.       • acetaminophen (Tylenol) tablet 650 mg  650 mg Enteral Tube Q4HRS PRN Elliott Salvador M.D.   650 mg at 05/06/21 0153   • magnesium hydroxide (MILK OF MAGNESIA) suspension 30 mL  30 mL Enteral Tube QDAY PRN Elliott Salvador M.D.        And   • senna-docusate (PERICOLACE or SENOKOT S) 8.6-50 MG per tablet 2 tablet  2 tablet Enteral Tube BID Elliott Salvador M.D.   Stopped at 05/04/21 1800    And   • polyethylene glycol/lytes (MIRALAX) PACKET 1 Packet  1 Packet Enteral Tube QDAY PRN Elliott Salvador M.D.        And   • bisacodyl (DULCOLAX) suppository 10 mg  10 mg Rectal QDAY PRN Elliott Salvador M.D.       • divalproex (DEPAKOTE SPRINKLE) capsule 500 mg  500 mg Enteral Tube Q8HRS Elliott Salvador M.D.   500 mg at 05/06/21 1310   • DULoxetine (CYMBALTA) capsule 60 mg  60 mg Enteral Tube BID Elliott Salvador M.D.   60 mg at 05/06/21 0519   • PARoxetine (PAXIL) tablet 10 mg  10 mg Enteral Tube QAM Elliott Salvador M.D.   10 mg at 05/06/21 0521   • risperiDONE (RISPERDAL) tablet 2 mg  2 mg Enteral Tube BID Elliott Salvador M.D.   2 mg at 05/06/21 0520   • Respiratory Therapy Consult   Nebulization Continuous RT Man Wahl M.D.       • famotidine  (PEPCID) tablet 20 mg  20 mg Enteral Tube Q12HRS Man Wahl M.D.   20 mg at 05/06/21 0519   • MD Alert...ICU Electrolyte Replacement per Pharmacy   Other PHARMACY TO DOSE Man Wahl M.D.       • lidocaine (XYLOCAINE) 1 % injection 1-2 mL  1-2 mL Tracheal Tube Q30 MIN PRN Man Wahl M.D.       • ipratropium-albuterol (DUONEB) nebulizer solution  3 mL Nebulization Q2HRS PRN (RT) Man Wahl M.D.           Fluids    Intake/Output Summary (Last 24 hours) at 5/6/2021 1432  Last data filed at 5/6/2021 1200  Gross per 24 hour   Intake 1337.85 ml   Output 2485 ml   Net -1147.15 ml       Laboratory          Recent Labs     05/04/21 0455 05/05/21 0320 05/06/21 0313   SODIUM 136 135 138   POTASSIUM 3.7 3.4* 3.9   CHLORIDE 104 102 103   CO2 24 26 29   BUN 17 19 17   CREATININE <0.17* <0.17* 0.18*   MAGNESIUM 1.7 1.8 1.7   PHOSPHORUS 3.0  --  3.1   CALCIUM 8.2* 8.2* 8.1*     Recent Labs     05/03/21 2055 05/04/21 0455 05/05/21 0320 05/06/21 0313   PREALBUMIN 16.9*  --   --   --    GLUCOSE  --  108* 93 108*     Recent Labs     05/04/21 0455 05/05/21 0320 05/06/21 0313   WBC 21.5* 15.1* 19.2*   NEUTSPOLYS 84.60* 80.60* 83.90*   LYMPHOCYTES 5.60* 9.10* 6.90*   MONOCYTES 6.60 7.60 7.50   EOSINOPHILS 0.00 0.00 0.00   BASOPHILS 0.30 0.60 0.50     Recent Labs     05/04/21 0455 05/05/21 0320 05/06/21 0313   RBC 2.73* 2.37* 2.92*   HEMOGLOBIN 8.2* 7.1* 8.6*   HEMATOCRIT 26.3* 23.1* 27.5*   PLATELETCT 370 326 337       Imaging  X-Ray:  I have personally reviewed the images and compared with prior images.  4/23 MICAH: large vegetations in MV and TV    Microbiology:  4/16 BCx 2/2 MSSA  4/17 BCx 2/2: negative  4/20 Trach aspirate: K. pneumonia  4/24 BAL: K. Pneumonia >100k cfu/ml.   4/25 BCx 2/2: negative  4/28 Pleural tissue (intraop OR specimen): K. Pneumonia. Pansensitive  5/4 BCx 2/2: pending, negative to date    Assessment/Plan  * Bacteremia due to methicillin susceptible Staphylococcus aureus  (MSSA)- (present on admission)  Assessment & Plan  Source presumed right anterior chest line/port with endocarditis  Multiple sources for MSSA including MV/TV vegetation, port, spinal stimulator/ shunt  Port has been removed  Last positive blood cultures were 4/17  Pt appeared to be on nafcillin from 4/16-4/23. Pt did receive MSSA therapy when she was in [facility].   ID following and treating MSSA bacteremia with Cefepime for CNS and lung pentretration.     Agitation requiring sedation protocol  Assessment & Plan  Patient was on propofol/fentanyl for approximately 2 weeks then versed and ketamine gtt.   Off versed and ketamine gtt 5/4  Increasing oxycodone to 20 Q4H. Will start Methadone 10mg Q8 for acute pain, gabapentin to limit IV narcotics and help wean dex gtt.       Trapped lung  Assessment & Plan  Decortication 4/28  Continue chest tubes to suction and hope lungs expand  R chest has had expanding somewhat.    There is now visible respiratory movement in the R chest and some improved expansion on CXR    Empyema of right pleural space (HCC)- (present on admission)  Assessment & Plan  R empyema due to septic pulmonary emboli  4/16 Chest tube placement at [facility]  4/18 Upsized by Dr Benedict  4/18-4/20 Received TPA/dornase.  Stopped b/c Hgb dropped requiring transfusion  4/26 was discontinued due to stopped functioning   4/28 s/p right VATS and decortication. Cx grew of Klebsiella pneumonia  May need another intervention in future, per Dr. Ganser  Has two chest tubes on the right and connect to -40 per surgery    Repeat CT next week        Acute bacterial endocarditis- (present on admission)  Assessment & Plan  MSSA bacteremia at OSH and here on 4/16. First negative blood cultures were on 4/17  Infected port removed at [facility]   shunt and spinal stimulator could be seeded, NSGY has consulted on both and don't recommend removal at admission due to HD instability.   MV and TV vegetations with septic pulmonary emboli  Nataly  ID Dr Omer/eBverley following.   K. Pneumonia empyema - on cefepime  Deemed not surgical candidate - No evidence based surgical indications (valvular CHF, difficult pathogen, etc) so no cardiovascular surgery consult is necessary at this time per prior note    Plan:   On cefepime for K. pneumonia  Received nafcilin from 4/16-4/22.   ID following    Follow up CT next week as surveillance     Acute respiratory failure with hypoxia (HCC)- (present on admission)  Assessment & Plan  4/15 Intubated for acute hypoxic respiratory failure at [facility] due to hydro/pneumo, trapped lung, lung abscesses  4/16 transferred to [facility]   4/16-4/28 Difficult to wean due to ongoing right loculated hydropneumothorax and lung abscesses, empyema. S/p 2 chest tubes  4/28 S/p right lung decortication by Dr. Ganser  5/1 s/p percutaneous tracheostomy    Concern of difficult to wean off sedation given prolonged sedation with prolonged intubation.   Pt was on versed gtt, scheduled oxycodone and dilaudid IV prn  4/30 ketamine gtt was started and planning to bridge and treat pain while weaning off versed    Plan:   Daily trach collar during day would rest on PS at night 8/8 with 's to prevent further de-recruitment of right trap lung  Continue diuresis with lasix 20 Q12H. Goal even to minimally negative fluid balance.   Tolerating tube feed at goal  GI was consulted, Dr. Moseley for possible PEG placement. Given pt appears to slowly recover, would like to give a chance for patient to recover before deciding for PEG.   Increase mobility       Acute encephalopathy- (present on admission)  Assessment & Plan  Likely multifactorial given CSF pleocytosis concerning of likely seeded spinal stimulator/ shunt, sedation  Pt has hx of  shunt for pseudotumor cerebri  Concern of autoimmune cerebritis was entertained but this was not confirmed.   Not candidate for MRI d/t spinal stimulator  EEG with encephalopathy and no seizures  Minimize  sedation as able  Daily SAT    Improved mouthing words writing on board      Pleural effusion, left  Assessment & Plan  Loculated fluid  Probably parapneumonic fluid from abscesses  CT guided chest tube placed 4/23, fluid sent  Lymph predominant, NGTD  TPA/dornase one dose on 4/24.  Hold d/t dropping Hgb  CT chest 4/25 with resolution of fluid  CT clamped 4/29-no significant accumulation of fluid so d/c 4/30    Fever  Assessment & Plan  Improved   Blood cultures repeated 5/5  Will need to revisit with NSGY regarding removal of spinal stimulator  On cefepime  ID following    Follow up CT's early next week    Pneumonia  Assessment & Plan  MSSA septic emboli with empyema   VAE  BAL 4/20 klebsiella pneumonia  BAL 4/24 still heavy growth of klebsiella  4/28 OR pleural fluid specimen growing klebsiella.   On cefepime    CSF pleocytosis- (present on admission)  Assessment & Plan  Status post lumbar puncture 4/13  WBC 53, 82% polys, Glucose 47, protein 97 Gram stain negative and culture negative at [facility]  Neurosurgery aware,  shunt in place  MRI brain requested by neurosurgery at [facility], not candidate d/t stimulator  Repeat ct no embolic lesions      Acute blood loss anemia  Assessment & Plan  Resolved  Hgb stable in 7-8s      S/P  shunt- (present on admission)  Assessment & Plan  History of pseudotumor cerebri  History of  shunt by Dr. Oh  Could be seeded by MSSA, Dr. Oh consulted at [facility], recommended MRI but unable given her spinal stimulator  Need to revisit this when patient is more stable      Goals of care, counseling/discussion  Assessment & Plan  Palliative is consulting  Dr. Sun spoke with Benoit on 4/27. I explained my concern that even if Enedelia were to survive this her QOL would likely be quite a bit worse.  She won't be able to have another port and was receiving IV meds for HA 3-4 times weekly.  She also could have worse headaches after this episode of meningitis.  He asked if she could have brain  damage, and I explained that was possible but hard to know right now.  He says part of him thinks Enedelia would want to keep fighting and part of him thinks she would say to stop aggressive care because her QOL was already very limited by her headaches. Continue aggressive care for now.  Dr Upton 5/4 spoke with  who's at bedside and updated him about patient's critical condition.     Spinal cord stimulator status  Assessment & Plan  Patient's stimulator is [employer]. It is FDA approved as MRI compatible, but the company has gone out of business, so there is no support team to assist with MRI.  Thus, if MRI were performed, our radiologists would need to protocol it correctly to manage the stimulator. The former rep from [employer] is Andre, [phone].  Information obtained from Dr. Mahoney's office, [phone].    Per  (5/4/2021), it is not MRI compatible unfortunately.     Pulmonary abscess (HCC)  Assessment & Plan  Septic emboli from TV endocarditis    Septic shock (HCC)- (present on admission)  Assessment & Plan  Shock resolved    Abnormal LFTs  Assessment & Plan  Chronic alk phos elevation  GGT high c/w liver source  Outpatient workup    Chronic pain syndrome- (present on admission)  Assessment & Plan  Chronic back pain and intractable headaches  History of nerve stimulator   Dr Vargas consulted at admission, no plan to remove stimulator due to her clinical instability  Multimodal with norco, gabapentin, methadone    Anxiety- (present on admission)  Assessment & Plan  Depakote, paxil, cymbalta and gabapentin     VTE:  lovenox  Ulcer: H2 Antagonist  Lines: Central Line  Ongoing indication addressed and Lopez Catheter  Ongoing indication addressed   Two chest tubes on the right    I have performed a physical exam and reviewed and updated ROS and Plan today (5/6/2021). In review of yesterday's note (5/5/2021), there are no changes except as documented above.       Discussed patient condition and risk of  morbidity and/or mortality with RN, RT, Pharmacy, Dietary, Code status disscussed, Charge nurse / hot rounds and infectious disease.      The patient remains critically ill requiring mechanical ventilation, difficulty weaning from vent, and dex gtt titration.  Critical care time = 39 minutes in directly providing and coordinating critical care and extensive data review.  No time overlap and excludes procedures

## 2021-05-06 NOTE — THERAPY
Physical Therapy   Initial Evaluation     Patient Name: Enedeila Pang  Age:  50 y.o., Sex:  female  Medical Record #: [id number]  Today's Date: 5/6/2021     Precautions: Fall Risk, Tracheostomy , Chest Tube    Assessment  Pt presents to PT with impaired functional endurance, strength, balance, gait and general locomotion associated with deconditioning and medical co-morbidities. She was able to demonstrate bed mobility with mod A, sit<>stands with min A and stand step transfers with min/mod A (needed increased assist 2' to lines/tubes as well). She appears capable of increased mobility though was in part limited 2' to vent/lines including CT's to suction. Will continue to visit with details/recs below.     Plan    Recommend Physical Therapy 3 times per week until therapy goals are met for the following treatments:  Bed Mobility, Community Re-integration, Equipment, Gait Training, Manual Therapy, Neuro Re-Education / Balance, Self Care/Home Evaluation, Stair Training, Therapeutic Activities and Therapeutic Exercises    DC Equipment Recommendations: Unable to determine at this time  Discharge Recommendations: Recommend post-acute placement for additional physical therapy services prior to discharge home        05/06/21 1646   Prior Living Situation   Prior Services Home-Independent   Housing / Facility 2 Story Apartment / Condo   Steps Into Home 14   Bathroom Set up Walk In Shower   Equipment Owned None   Lives with - Patient's Self Care Capacity Spouse   Comments Reports lives with spouse and son; spouse is home all the time and can assist PRN. Mother does the driving   Prior Level of Functional Mobility   Bed Mobility Independent   Transfer Status Independent   Ambulation Independent   Distance Ambulation (Feet)   (community)   Assistive Devices Used None   Stairs Independent   Comments pt may have hx of CVA with LUE deficits; reports progressed to ambulation with no AD   Cognition    Cognition / Consciousness X    Speech/ Communication Nods Appropriately;Intubated / Trached   Level of Consciousness Alert   Comments pleasant and cooperative; trach though able to mouth words/make needs known   Passive ROM Lower Body   Passive ROM Lower Body WDL   Active ROM Lower Body    Active ROM Lower Body  WDL   Strength Lower Body   Lower Body Strength  X   Gross Strength Generalized Weakness, Equal Bilaterally   Sensation Lower Body   Lower Extremity Sensation   WDL   Strength Upper Body   Upper Body Strength  X   Comments RUE limited with shoulder flexion/abduction; fair with elbow; pt reports old injury (CVA?/unable to communicate details, though appears chronic)   Neurological Concerns   Neurological Concerns Yes   Comments given RUE deficits   Balance Assessment   Sitting Balance (Static) Fair -   Sitting Balance (Dynamic) Fair -   Standing Balance (Static) Poor +   Standing Balance (Dynamic) Poor +   Weight Shift Sitting Fair   Weight Shift Standing Poor   Comments no arvind LOB during standing/stand step from EOB to chair; tolerates fair though does appear to fatigue quickly    Gait Analysis   Gait Level Of Assist Unable to Participate   Weight Bearing Status no restrictions   Comments see balance   Bed Mobility    Supine to Sit Maximal Assist   Sit to Supine Moderate Assist   Scooting Moderate Assist   Comments HOB elevated + HHA/trunk assist   Functional Mobility   Sit to Stand Minimal Assist   Bed, Chair, Wheelchair Transfer Minimal Assist   Transfer Method Stand Step   Mobility bed mobility, EOB, sit<>stands, stand step transfer   ICU Target Mobility Level   ICU Mobility - Targeted Level Level 3B   How much difficulty does the patient currently have...   Turning over in bed (including adjusting bedclothes, sheets and blankets)? 1   Sitting down on and standing up from a chair with arms (e.g., wheelchair, bedside commode, etc.) 1   Moving from lying on back to sitting on the side of the bed? 1   How much help from another person  does the patient currently need...   Moving to and from a bed to a chair (including a wheelchair)? 3   Need to walk in a hospital room? 3   Climbing 3-5 steps with a railing? 2   6 clicks Mobility Score 11   Activity Tolerance   Sitting in Chair at least 10 mins   Sitting Edge of Bed at leat 10 mins   Standing ~ 1 min total   Comments fatigue   Edema / Skin Assessment   Edema / Skin  X   Comments 2x chest tubes   Patient / Family Goals    Patient / Family Goal #1 unable to effectively state   Short Term Goals    Short Term Goal # 1 Pt will be able to perform supine<>sit with HOB flat/no rails with Spv in 6tx to promote fnx progression towards  I    Short Term Goal # 2 Pt will be able to perform sit<>stand/transfers with FWW with SPv in 6tx to promote fnx progression towards I    Short Term Goal # 3 Pt will be able to ambulate 150ft with FWW iwth SPv in 6tx to promote fnx progression towards I    Short Term Goal # 4 Pt will be able to ambulate up/down FOS with rail wtih Spv in 6tx to promote eventual dc to home plan   Education Group   Education Provided Role of Physical Therapist   Role of Physical Therapist Patient Response Patient;Acceptance;Explanation;Verbal Demonstration

## 2021-05-06 NOTE — CARE PLAN
Problem: Safety  Goal: Will remain free from falls  Outcome: PROGRESSING AS EXPECTED   Fall precautions in place. Bed in lowest position. Non-skid socks in place. Personal possessions within reach. Mobility sign on door. Bed-alarm on. Call light within reach. Pt educated regarding fall prevention and states understanding.      Problem: Knowledge Deficit  Goal: Knowledge of disease process/condition, treatment plan, diagnostic tests, and medications will improve  Outcome: PROGRESSING AS EXPECTED   Pt educated regarding plan of care and medications. All questions answered.      Problem: Pain Management  Goal: Pain level will decrease to patient's comfort goal  Outcome: PROGRESSING AS EXPECTED    Pt assessed for pain regularly and medicated PRN per MAR.

## 2021-05-06 NOTE — ASSESSMENT & PLAN NOTE
Improving  Continue Depakote, paxil, cymbalta, gabapentin, as clinically improves transition off some of these agents?  Limited as needed benzodiazepines

## 2021-05-06 NOTE — CARE PLAN
Problem: Ventilation Defect:  Goal: Ability to achieve and maintain unassisted ventilation or tolerate decreased levels of ventilator support  Outcome: PROGRESSING SLOWER THAN EXPECTED   Adult Ventilation Update    Total Vent Days: 20, trach 5     Patient failed trials because of Safety screen spontaneous breathing trial (SBT): Proceed with SBT - no exclusion criteria met (05/05/21 1402)    Sputum/Suction   Cough: Productive (05/05/21 1600)  Sputum Amount: Moderate (05/05/21 1600)  Sputum Color: Tan;Yellow (05/05/21 1600)  Sputum Consistency: Thick (05/05/21 1600)      Events/Summary/Plan:  jose spont mode, placed on t-piece at 1400 , jose well at this time (05/05/21 1402)

## 2021-05-07 ENCOUNTER — APPOINTMENT (OUTPATIENT)
Dept: RADIOLOGY | Facility: MEDICAL CENTER | Age: 51
DRG: 003 | End: 2021-05-07
Attending: INTERNAL MEDICINE
Payer: MEDICARE

## 2021-05-07 LAB
ANION GAP SERPL CALC-SCNC: 3 MMOL/L (ref 7–16)
ANISOCYTOSIS BLD QL SMEAR: ABNORMAL
BASOPHILS # BLD AUTO: 0 % (ref 0–1.8)
BASOPHILS # BLD: 0 K/UL (ref 0–0.12)
BUN SERPL-MCNC: 17 MG/DL (ref 8–22)
CALCIUM SERPL-MCNC: 8.6 MG/DL (ref 8.5–10.5)
CHLORIDE SERPL-SCNC: 99 MMOL/L (ref 96–112)
CO2 SERPL-SCNC: 32 MMOL/L (ref 20–33)
CREAT SERPL-MCNC: 0.21 MG/DL (ref 0.5–1.4)
EOSINOPHIL # BLD AUTO: 0 K/UL (ref 0–0.51)
EOSINOPHIL NFR BLD: 0 % (ref 0–6.9)
ERYTHROCYTE [DISTWIDTH] IN BLOOD BY AUTOMATED COUNT: 67.1 FL (ref 35.9–50)
GLUCOSE SERPL-MCNC: 100 MG/DL (ref 65–99)
HCT VFR BLD AUTO: 29.3 % (ref 37–47)
HGB BLD-MCNC: 9.4 G/DL (ref 12–16)
LYMPHOCYTES # BLD AUTO: 0.8 K/UL (ref 1–4.8)
LYMPHOCYTES NFR BLD: 6 % (ref 22–41)
MACROCYTES BLD QL SMEAR: ABNORMAL
MAGNESIUM SERPL-MCNC: 1.7 MG/DL (ref 1.5–2.5)
MANUAL DIFF BLD: NORMAL
MCH RBC QN AUTO: 30.3 PG (ref 27–33)
MCHC RBC AUTO-ENTMCNC: 32.1 G/DL (ref 33.6–35)
MCV RBC AUTO: 94.5 FL (ref 81.4–97.8)
MONOCYTES # BLD AUTO: 0.35 K/UL (ref 0–0.85)
MONOCYTES NFR BLD AUTO: 2.6 % (ref 0–13.4)
MORPHOLOGY BLD-IMP: NORMAL
NEUTROPHILS # BLD AUTO: 12.16 K/UL (ref 2–7.15)
NEUTROPHILS NFR BLD: 91.4 % (ref 44–72)
NRBC # BLD AUTO: 0 K/UL
NRBC BLD-RTO: 0 /100 WBC
PHOSPHATE SERPL-MCNC: 2.8 MG/DL (ref 2.5–4.5)
PLATELET # BLD AUTO: 357 K/UL (ref 164–446)
PLATELET BLD QL SMEAR: NORMAL
PMV BLD AUTO: 9.1 FL (ref 9–12.9)
POTASSIUM SERPL-SCNC: 4.1 MMOL/L (ref 3.6–5.5)
RBC # BLD AUTO: 3.1 M/UL (ref 4.2–5.4)
RBC BLD AUTO: PRESENT
SODIUM SERPL-SCNC: 134 MMOL/L (ref 135–145)
WBC # BLD AUTO: 13.3 K/UL (ref 4.8–10.8)

## 2021-05-07 PROCEDURE — 83735 ASSAY OF MAGNESIUM: CPT

## 2021-05-07 PROCEDURE — 94640 AIRWAY INHALATION TREATMENT: CPT

## 2021-05-07 PROCEDURE — 80048 BASIC METABOLIC PNL TOTAL CA: CPT

## 2021-05-07 PROCEDURE — 700111 HCHG RX REV CODE 636 W/ 250 OVERRIDE (IP): Performed by: INTERNAL MEDICINE

## 2021-05-07 PROCEDURE — 99291 CRITICAL CARE FIRST HOUR: CPT | Performed by: INTERNAL MEDICINE

## 2021-05-07 PROCEDURE — 85007 BL SMEAR W/DIFF WBC COUNT: CPT

## 2021-05-07 PROCEDURE — 700102 HCHG RX REV CODE 250 W/ 637 OVERRIDE(OP): Performed by: INTERNAL MEDICINE

## 2021-05-07 PROCEDURE — A9270 NON-COVERED ITEM OR SERVICE: HCPCS | Performed by: INTERNAL MEDICINE

## 2021-05-07 PROCEDURE — 700105 HCHG RX REV CODE 258: Performed by: INTERNAL MEDICINE

## 2021-05-07 PROCEDURE — 700101 HCHG RX REV CODE 250: Performed by: INTERNAL MEDICINE

## 2021-05-07 PROCEDURE — B548ZZA ULTRASONOGRAPHY OF SUPERIOR VENA CAVA, GUIDANCE: ICD-10-PCS | Performed by: INTERNAL MEDICINE

## 2021-05-07 PROCEDURE — 02HV33Z INSERTION OF INFUSION DEVICE INTO SUPERIOR VENA CAVA, PERCUTANEOUS APPROACH: ICD-10-PCS | Performed by: INTERNAL MEDICINE

## 2021-05-07 PROCEDURE — 85027 COMPLETE CBC AUTOMATED: CPT

## 2021-05-07 PROCEDURE — 94799 UNLISTED PULMONARY SVC/PX: CPT

## 2021-05-07 PROCEDURE — 770022 HCHG ROOM/CARE - ICU (200)

## 2021-05-07 PROCEDURE — 71045 X-RAY EXAM CHEST 1 VIEW: CPT

## 2021-05-07 PROCEDURE — 84100 ASSAY OF PHOSPHORUS: CPT

## 2021-05-07 PROCEDURE — 94003 VENT MGMT INPAT SUBQ DAY: CPT

## 2021-05-07 PROCEDURE — C1751 CATH, INF, PER/CENT/MIDLINE: HCPCS

## 2021-05-07 RX ORDER — POTASSIUM CHLORIDE 20 MEQ/1
40 TABLET, EXTENDED RELEASE ORAL 2 TIMES DAILY
Status: DISCONTINUED | OUTPATIENT
Start: 2021-05-07 | End: 2021-05-08

## 2021-05-07 RX ORDER — MAGNESIUM SULFATE HEPTAHYDRATE 40 MG/ML
2 INJECTION, SOLUTION INTRAVENOUS ONCE
Status: COMPLETED | OUTPATIENT
Start: 2021-05-07 | End: 2021-05-07

## 2021-05-07 RX ORDER — HYDROMORPHONE HYDROCHLORIDE 1 MG/ML
1-2 INJECTION, SOLUTION INTRAMUSCULAR; INTRAVENOUS; SUBCUTANEOUS EVERY 4 HOURS PRN
Status: DISCONTINUED | OUTPATIENT
Start: 2021-05-07 | End: 2021-05-11

## 2021-05-07 RX ADMIN — POTASSIUM CHLORIDE 40 MEQ: 1500 TABLET, EXTENDED RELEASE ORAL at 17:22

## 2021-05-07 RX ADMIN — FUROSEMIDE 20 MG: 10 INJECTION, SOLUTION INTRAMUSCULAR; INTRAVENOUS at 16:13

## 2021-05-07 RX ADMIN — DEXMEDETOMIDINE 0.2 MCG/KG/HR: 200 INJECTION, SOLUTION INTRAVENOUS at 17:34

## 2021-05-07 RX ADMIN — DULOXETINE HYDROCHLORIDE 60 MG: 60 CAPSULE, DELAYED RELEASE ORAL at 17:21

## 2021-05-07 RX ADMIN — DIVALPROEX SODIUM 500 MG: 125 CAPSULE ORAL at 05:48

## 2021-05-07 RX ADMIN — ENOXAPARIN SODIUM 40 MG: 40 INJECTION SUBCUTANEOUS at 05:46

## 2021-05-07 RX ADMIN — FAMOTIDINE 20 MG: 20 TABLET ORAL at 05:47

## 2021-05-07 RX ADMIN — HYDROMORPHONE HYDROCHLORIDE 2 MG: 1 INJECTION, SOLUTION INTRAMUSCULAR; INTRAVENOUS; SUBCUTANEOUS at 00:46

## 2021-05-07 RX ADMIN — METHADONE HYDROCHLORIDE 5 MG: 10 CONCENTRATE ORAL at 05:45

## 2021-05-07 RX ADMIN — RISPERIDONE 2 MG: 1 TABLET, FILM COATED ORAL at 05:47

## 2021-05-07 RX ADMIN — HYDROMORPHONE HYDROCHLORIDE 2 MG: 1 INJECTION, SOLUTION INTRAMUSCULAR; INTRAVENOUS; SUBCUTANEOUS at 12:50

## 2021-05-07 RX ADMIN — HYDROMORPHONE HYDROCHLORIDE 2 MG: 1 INJECTION, SOLUTION INTRAMUSCULAR; INTRAVENOUS; SUBCUTANEOUS at 23:44

## 2021-05-07 RX ADMIN — DIVALPROEX SODIUM 500 MG: 125 CAPSULE ORAL at 21:16

## 2021-05-07 RX ADMIN — GABAPENTIN 200 MG: 100 CAPSULE ORAL at 21:16

## 2021-05-07 RX ADMIN — DIVALPROEX SODIUM 500 MG: 125 CAPSULE ORAL at 14:30

## 2021-05-07 RX ADMIN — OXYCODONE HYDROCHLORIDE 20 MG: 10 TABLET ORAL at 05:47

## 2021-05-07 RX ADMIN — GABAPENTIN 200 MG: 100 CAPSULE ORAL at 05:47

## 2021-05-07 RX ADMIN — METHADONE HYDROCHLORIDE 5 MG: 10 CONCENTRATE ORAL at 21:15

## 2021-05-07 RX ADMIN — DULOXETINE HYDROCHLORIDE 60 MG: 60 CAPSULE, DELAYED RELEASE ORAL at 05:48

## 2021-05-07 RX ADMIN — FAMOTIDINE 20 MG: 20 TABLET ORAL at 17:22

## 2021-05-07 RX ADMIN — OXYCODONE HYDROCHLORIDE 20 MG: 10 TABLET ORAL at 02:15

## 2021-05-07 RX ADMIN — GABAPENTIN 200 MG: 100 CAPSULE ORAL at 14:30

## 2021-05-07 RX ADMIN — METHADONE HYDROCHLORIDE 5 MG: 10 CONCENTRATE ORAL at 14:30

## 2021-05-07 RX ADMIN — DOCUSATE SODIUM 50 MG AND SENNOSIDES 8.6 MG 2 TABLET: 8.6; 5 TABLET, FILM COATED ORAL at 05:49

## 2021-05-07 RX ADMIN — CEFEPIME 2 G: 2 INJECTION, POWDER, FOR SOLUTION INTRAVENOUS at 17:21

## 2021-05-07 RX ADMIN — OXYCODONE HYDROCHLORIDE 20 MG: 10 TABLET ORAL at 17:22

## 2021-05-07 RX ADMIN — HYDROMORPHONE HYDROCHLORIDE 2 MG: 1 INJECTION, SOLUTION INTRAMUSCULAR; INTRAVENOUS; SUBCUTANEOUS at 19:21

## 2021-05-07 RX ADMIN — DOCUSATE SODIUM 50 MG AND SENNOSIDES 8.6 MG 2 TABLET: 8.6; 5 TABLET, FILM COATED ORAL at 17:21

## 2021-05-07 RX ADMIN — OXYCODONE HYDROCHLORIDE 20 MG: 10 TABLET ORAL at 14:30

## 2021-05-07 RX ADMIN — MAGNESIUM SULFATE 2 G: 2 INJECTION INTRAVENOUS at 07:57

## 2021-05-07 RX ADMIN — PAROXETINE HYDROCHLORIDE 10 MG: 20 TABLET, FILM COATED ORAL at 05:49

## 2021-05-07 RX ADMIN — CEFEPIME 2 G: 2 INJECTION, POWDER, FOR SOLUTION INTRAVENOUS at 10:26

## 2021-05-07 RX ADMIN — POTASSIUM BICARBONATE 50 MEQ: 978 TABLET, EFFERVESCENT ORAL at 05:50

## 2021-05-07 RX ADMIN — MAGNESIUM SULFATE 2 G: 2 INJECTION INTRAVENOUS at 14:30

## 2021-05-07 RX ADMIN — OXYCODONE HYDROCHLORIDE 20 MG: 10 TABLET ORAL at 21:16

## 2021-05-07 RX ADMIN — CEFEPIME 2 G: 2 INJECTION, POWDER, FOR SOLUTION INTRAVENOUS at 02:16

## 2021-05-07 RX ADMIN — FUROSEMIDE 20 MG: 10 INJECTION, SOLUTION INTRAMUSCULAR; INTRAVENOUS at 05:47

## 2021-05-07 RX ADMIN — RISPERIDONE 2 MG: 1 TABLET, FILM COATED ORAL at 17:22

## 2021-05-07 RX ADMIN — HYDROMORPHONE HYDROCHLORIDE 2 MG: 1 INJECTION, SOLUTION INTRAMUSCULAR; INTRAVENOUS; SUBCUTANEOUS at 04:35

## 2021-05-07 RX ADMIN — HYDROMORPHONE HYDROCHLORIDE 2 MG: 1 INJECTION, SOLUTION INTRAMUSCULAR; INTRAVENOUS; SUBCUTANEOUS at 08:45

## 2021-05-07 ASSESSMENT — PAIN DESCRIPTION - PAIN TYPE
TYPE: CHRONIC PAIN;ACUTE PAIN
TYPE: CHRONIC PAIN;ACUTE PAIN
TYPE: ACUTE PAIN;CHRONIC PAIN
TYPE: ACUTE PAIN;CHRONIC PAIN
TYPE: CHRONIC PAIN;ACUTE PAIN
TYPE: ACUTE PAIN;CHRONIC PAIN

## 2021-05-07 ASSESSMENT — ENCOUNTER SYMPTOMS
ABDOMINAL PAIN: 1
SHORTNESS OF BREATH: 1

## 2021-05-07 ASSESSMENT — FIBROSIS 4 INDEX: FIB4 SCORE: 0.71

## 2021-05-07 NOTE — DIETARY
"Nutrition Services: Weekly TF update  Day 21 of admit.  Enedelia Pang is a 50 y.o. female with admitting DX of Empyema, sepsis due to port line infection, endocarditis, Empyema lung (HCC)     Cortrak was clogged this morning and had to be replaced.  Current TF regimen: Impact Peptide 1.5, goal rate 45 ml/hr to provide 1620 kcal, 102 grams protein, and 832 ml free water per day.     Assessment:  Height: 63\", Weight: 81.2 kg, BMI 31.72  Weight Used in Calculations: 75 kg-- Initial weight on admit (4/16), BMI 29.29  Ideal Body Weight: 52.2 kg (115 lb)     Estimated needs:  REE per MSJ x1.1-1.2 = 0375-6242 kcal/day  1788-9877 kcal/day = 20-23 kcal/day  1.2-1.5 grams protein/kg =  grams protein/day      Evaluation:   1. Pt remains on vent support, day 22. T-piece trials observed.  2. Labs: Na 134, glu 100, Cr 0.21; pre-albumin and CRP improved over 1 week.  3. Meds include lasix, electrolyte replacement, and bowel regimen.  4. Last BM 5/5  5. No wounds to follow.  6. Low-volume, high-protein TF formula remains the most appropriate to meet nutritional needs @ this time.      Malnutrition Risk: None identified @ this time.      Recommendations/Plan:  1. Continue current TF regimen once new Cortrak has been confirmed.   2. Fluids per MD.     RD following.   "

## 2021-05-07 NOTE — PROGRESS NOTES
Patient care assumed. Cortrak found to be clogged this morning unable to give morning medications Dr. Lozano aware. Goldie kumar ordered.

## 2021-05-07 NOTE — CARE PLAN
Problem: Mechanical Ventilation  Goal: Ability to express needs and understand communication  Outcome: PROGRESSING SLOWER THAN EXPECTED   Adult Ventilation Update    Total Vent Days: 22, trach 7         Patient failed trials because of Safety screen spontaneous breathing trial (SBT): Proceed with SBT - no exclusion criteria met (05/07/21 1017)    Sputum/Suction   Cough: Productive (05/07/21 0621)  Sputum Amount: Small (05/07/21 1200)  Sputum Color: Tan;White (05/07/21 1200)  Sputum Consistency: Thick;Thin (05/07/21 1200)      Events/Summary/Plan: spont mode over night w/ PS of 10, jose well. Placed on t-piece at 1015 today- jose well also. Did 7.5 hours 5/6. Rest on vent at night per MD

## 2021-05-07 NOTE — PROGRESS NOTES
"Critical Care Progress Note    Date of admission  4/16/2021    Chief Complaint  50 y.o. female the past medical history of pseudotumor cerebri status post  shunt by Dr. Oh, chronic pain with history of placement of nerve stimulator, vasculitis, right anterior chest port for home IV meds with recurrent infection who presented 4/11/2021 with to [facility] with recurrent port infection and was initially treated with vancomycin and Zosyn and then changed to ceftaroline and subsequently switched to nafcillin when MSSA was identified.    Hospital Course  \"50 y.o. female the past medical history of pseudotumor cerebri status post  shunt by Dr. Oh, chronic pain with history of placement of nerve stimulator, vasculitis, right anterior chest port for home IV meds with recurrent infection who presented 4/11/2021 with to [facility] with recurrent port infection. Found to have MSSA bacteremia, endocarditis, septic pulmonary emboli/empyema/pneumatocele, and CSF pleocytosis concerning for  shunt involvement.  Seen by Dr. Oh, NSGY, at [facility] who did not recommend  shunt removal.  Seen by Dr. Vagras here for concern of fluid collection around her spinal stimulator and he recommended against removal. Remains intubated, encephalopathic.    4/16 admitted to ICU  4/17 VD 2, 2 FFP, pRBC overnight; new chest tube per gen surg  4/18 VD 3, TPa/Dornase with 1400 cc from R chest tube    4/26: R chest tube not functioning, d/c it.  broke his leg and going to OR today, so not available currently.  VD 11.  8/30%. RSBI immediately high on SBT attempt. Followed commands for RN  4/27: VD12. 8/30%. Not tolerating wean, high RSBI and tachycardic.   4/28: VD 13. VATS with Dr. Ganser.    4/29: VD 14. 8/30%. Still not tolerating wean d/t severe tachycardia just with SAT. Trialed precedex and she required fentanyl up to 600/hr to tolerate, so back on propofol. Chest tubes -40  4/30: VD 15. 8/30%. Try ketamine/versed as sedative. Severe " [facility][note 3], [id number] [facility] [id number]  [phone]      Kristi Melton :1975 MRN:[id number]    3/6/2020 Time Session Began: 915  Time Session Ended: 1000    Session Type:45 Minute Therapy (08121)    Others Present:  services    Intervention: Behavioral, Cognitive, Supportive    Suicide/Homicide/Violence Ideation: No    If Yes, explain: N/A    Current Outpatient Medications   Medication Sig   • acetaminophen (TYLENOL 8 HOUR) 650 MG CR tablet Take 1 tablet by mouth every 8 hours as needed for Pain.   • Artificial Tear Ointment (DRY EYES OP)      No current facility-administered medications for this visit.        Change in Medication(s) Reported: Yes  If Yes, explain: Kristi/s self d/c psychotropic medication.    Patient/Family Education Provided: Yes  Patient/Family Displays Understanding: Yes    If No, explain:     Chief complaint in patient's own words: \"[Mood] it hasn't been very good.\"    Progress Note containing chief complaint and symptoms/problems related to the complaint:    (Data/Action/Response/Plan)    D: Kristi stopped taking her psychotropic medication; discussed the importance of talking to her doctor first before stopping medication.  She reports the medication made her \"sleepy.\"  She states she would feel more comfortable talking to a female psychiatrist and was agreeable to call central scheduling to make an appointment with a female prescriber.  Kristi states, “People that surround us are like our enemies, on my 's side [family living on the same street].  People have been getting into my e-mail.  It's invasion of privacy.  I found out the same thing has been happening with my 's and son's accounts too.  It's like an attack and that's not the only thing, they've been calling my phone and it's numbers I don't recognize so I don't answer.\"  She explained that she went to the [facility] and that  tachycardia and HTN with weaning down propofol. Plan for perc trach tomorrow. Chest tubes -40  5/1: perc trach. PICC placed. Chest tubes remain -40.  5/2: Able to spont well on sedation, but very tachy/HTN when sedation is turned down.  5/3 Versed weaned down to 3mg/min.   5/4 off versed and ketamine gtt  5/5 trials of SBT w/ trach  5/6 T piece trial during day, mobilize to chair x2, rest vent 8/8 at night, started methadone, gabapentin     Interval Problem Update  Neuro: following commands, up to chair x2, methadone dilaudid, dex 0.3  HR: 's  SBP: 's  Tmax: afebrile  GI: cortrac new, BM 5/6  UOP: good  Lines: CT 30mls and 30ml in both CT, pulled out pic, nevarez  Resp: vent 22 trach 7 t piece 7 hours rest on spont  Vte: lovenox  PPI/H2:pepcid  Antibx: cefepimine    D/c klyte -> kcl   Dec freq of dilaudid to Q4  Mag replace  D/c nevarez   picc line      Review of Systems  Review of Systems   Unable to perform ROS: Intubated        Vital Signs for last 24 hours   Temp:  [35.9 °C (96.6 °F)-37.3 °C (99.1 °F)] 35.9 °C (96.6 °F)  Pulse:  [] 114  Resp:  [13-28] 16  BP: ()/(48-93) 82/51  SpO2:  [94 %-99 %] 96 %    Hemodynamic parameters for last 24 hours       Respiratory Information for the last 24 hours  Vent Mode: Spont  PEEP/CPAP: 8  P Support: 10  MAP: 11  Control VTE (exp VT): 390    Physical Exam   Physical Exam  Vitals and nursing note reviewed.   Constitutional:       Appearance: She is ill-appearing.      Interventions: She is sedated and intubated.      Comments: Chronically ill appearing, trach in place   HENT:      Head:      Comments: Wasting temples     Mouth/Throat:      Mouth: Mucous membranes are dry.   Eyes:      Pupils: Pupils are equal, round, and reactive to light.   Neck:      Comments: Trach in place  8.0 Portex  Cardiovascular:      Rate and Rhythm: Regular rhythm. Tachycardia present.      Heart sounds: Murmur present.   Pulmonary:      Effort: No respiratory distress. She is  they fixed her phone, \"but it's still a problem.\"  Kristi continued, \"I went to the police, but they said there wasn't a person to arrest [police need tangible proof].\"  She discussed her email situation; \"I changed my password, but it keeps happening.\"  We discussed a solution, deleting her email account and setting up a new one; she states that's what police advised as well.  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  She has not been practicing deep breathing technique on a regular basis.  She reports experiencing panic attacks on occasion.  She recently she got pulled over by a  for driving too fast and got an anxiety attack.  A: Problem solving, socratic questioning (addressing paranoia), supportive session.  Discussed future direction of therapy.  Kristi would like to work on:  \"To know what I can do to feel more relaxed.\"  Reviewed deep breathing technique and watch a video and practiced technique in session.  Writer explained that working with a psychiatric prescriber is an important piece of her treatment plan.  R:  Kristi reports that her mood has been “worse.”  Affect with mild restriction.  Coping appears to be fair.  She reports experiencing “a lot of headaches and anxiety.”  Thinking style paranoid.  Sleep: \"I'm not sleeping much [because] I'm watching over my family [to make sure they are safe].\"  Appetite:  \"I'm eating the same, always well.”  She reports that she has decreased her caffeine consumption to half a cup of coffee in the morning and half a cup in the afternoon.    P: Return in 1-2 weeks.      Need for Community Resources Assessed: No    Resources Needed: Unsure    If Yes, what resources:     Diagnosis: BRUNILDA (generalized anxiety disorder)  (primary encounter diagnosis)  Paranoia (CMS/HCC)  Insomnia due to mental disorder    Treatment Plan: Unchanged    Discharge Plan: N/A    Next Appointment: 3/17/2020  Klaudia Kemp PSYD   intubated.      Breath sounds: No stridor. No wheezing or rhonchi.      Comments: Some air movement to right chest, clear left without wheezing or rales.  Abdominal:      General: Bowel sounds are normal. There is no distension.      Tenderness: There is no abdominal tenderness. There is no guarding.   Musculoskeletal:      Cervical back: No rigidity or tenderness.      Right lower leg: Edema present.      Left lower leg: Edema present.      Comments: Edema to hands and feet   Skin:     General: Skin is warm and dry.      Capillary Refill: Capillary refill takes 2 to 3 seconds.      Coloration: Skin is pale.   Neurological:      Comments: Awake able to mouth words, shakes head yes and no, ext weak bilateral moves all extremities, no numbness to extremities.    Psychiatric:      Comments: Unable to assess         Medications  Current Facility-Administered Medications   Medication Dose Route Frequency Provider Last Rate Last Admin   • potassium chloride SA (Kdur) tablet 40 mEq  40 mEq Enteral Tube BID Juventino Lozano M.D.   Stopped at 05/07/21 0757   • HYDROmorphone (Dilaudid) injection 1-2 mg  1-2 mg Intravenous Q4HRS PRN Juventino Lozano M.D.   2 mg at 05/07/21 1250   • magnesium sulfate IVPB premix 2 g  2 g Intravenous Once Juventino Lozano M.D.       • gabapentin (NEURONTIN) capsule 200 mg  200 mg Enteral Tube Q8HRS Juventino Lozano M.D.   200 mg at 05/07/21 0547   • methadone (DOLOPHINE) 10 MG/ML solution 5 mg  5 mg Enteral Tube Q8HRS Juventino Lozano M.D.   5 mg at 05/07/21 0545   • oxyCODONE immediate release (ROXICODONE) tablet 20 mg  20 mg Enteral Tube Q4HRS Jonathan Upton D.O.   20 mg at 05/07/21 0547   • traMADol (ULTRAM) 50 MG tablet 50 mg  50 mg Enteral Tube Q6HRS PRN Jonathan Upton D.O.   50 mg at 05/06/21 2227   • dexmedetomidine (PRECEDEX) 400 mcg/100mL NS premix infusion  0.1-1.5 mcg/kg/hr Intravenous Continuous LEYAL Caban.O. 6.3 mL/hr at 05/06/21 2134 0.3 mcg/kg/hr at 05/06/21 2134   • cefepime  (MAXIPIME) 2 g in  mL IVPB  2 g Intravenous Q8HRS Eric Lowery M.D.   Stopped at 05/07/21 1056   • furosemide (LASIX) injection 20 mg  20 mg Intravenous BID DIURETIC Leti Sun M.D.   20 mg at 05/07/21 0547   • midazolam (Versed) 2 MG/2ML injection 2 mg  2 mg Intravenous Q HOUR PRN Leti Sun M.D.   2 mg at 05/05/21 0420   • labetalol (NORMODYNE/TRANDATE) injection 10 mg  10 mg Intravenous Q4HRS PRN Leti Sun M.D.   10 mg at 05/04/21 0226   • enoxaparin (LOVENOX) inj 40 mg  40 mg Subcutaneous DAILY Benoit Loya Jr., D.O.   40 mg at 05/07/21 0546   • Pharmacy Consult: Enteral tube insertion - review meds/change route/product selection  1 Each Other PHARMACY TO DOSE Elliott Salvador M.D.       • acetaminophen (Tylenol) tablet 650 mg  650 mg Enteral Tube Q4HRS PRN Elliott Salvador M.D.   650 mg at 05/06/21 0153   • magnesium hydroxide (MILK OF MAGNESIA) suspension 30 mL  30 mL Enteral Tube QDAY PRN Elliott Salvador M.D.        And   • senna-docusate (PERICOLACE or SENOKOT S) 8.6-50 MG per tablet 2 tablet  2 tablet Enteral Tube BID Elliott Salvador M.D.   2 tablet at 05/07/21 0549    And   • polyethylene glycol/lytes (MIRALAX) PACKET 1 Packet  1 Packet Enteral Tube QDAY PRN Elliott Salvador M.D.        And   • bisacodyl (DULCOLAX) suppository 10 mg  10 mg Rectal QDAY PRN Elliott Salvador M.D.       • divalproex (DEPAKOTE SPRINKLE) capsule 500 mg  500 mg Enteral Tube Q8HRS Elliott Salvador M.D.   500 mg at 05/07/21 0548   • DULoxetine (CYMBALTA) capsule 60 mg  60 mg Enteral Tube BID Elliott Salvador M.D.   60 mg at 05/07/21 0548   • PARoxetine (PAXIL) tablet 10 mg  10 mg Enteral Tube QAM Elliott Salvador M.D.   10 mg at 05/07/21 0549   • risperiDONE (RISPERDAL) tablet 2 mg  2 mg Enteral Tube BID Elliott Salvador M.D.   2 mg at 05/07/21 0547   • Respiratory Therapy Consult   Nebulization Continuous RT Man Wahl M.D.       • famotidine (PEPCID) tablet 20 mg  20 mg Enteral  Tube Q12HRS Man Wahl M.D.   20 mg at 05/07/21 0547   • MD Alert...ICU Electrolyte Replacement per Pharmacy   Other PHARMACY TO DOSE Man Wahl M.D.       • lidocaine (XYLOCAINE) 1 % injection 1-2 mL  1-2 mL Tracheal Tube Q30 MIN PRN Man Wahl M.D.       • ipratropium-albuterol (DUONEB) nebulizer solution  3 mL Nebulization Q2HRS PRN (RT) Man Wahl M.D.           Fluids    Intake/Output Summary (Last 24 hours) at 5/7/2021 1324  Last data filed at 5/7/2021 1000  Gross per 24 hour   Intake 752.15 ml   Output 3730 ml   Net -2977.85 ml       Laboratory          Recent Labs     05/05/21 0320 05/06/21 0313 05/07/21  0330   SODIUM 135 138 134*   POTASSIUM 3.4* 3.9 4.1   CHLORIDE 102 103 99   CO2 26 29 32   BUN 19 17 17   CREATININE <0.17* 0.18* 0.21*   MAGNESIUM 1.8 1.7 1.7   PHOSPHORUS  --  3.1 2.8   CALCIUM 8.2* 8.1* 8.6     Recent Labs     05/05/21 0320 05/06/21 0313 05/07/21  0330   GLUCOSE 93 108* 100*     Recent Labs     05/05/21 0320 05/06/21 0313 05/07/21  0330   WBC 15.1* 19.2* 13.3*   NEUTSPOLYS 80.60* 83.90* 91.40*   LYMPHOCYTES 9.10* 6.90* 6.00*   MONOCYTES 7.60 7.50 2.60   EOSINOPHILS 0.00 0.00 0.00   BASOPHILS 0.60 0.50 0.00     Recent Labs     05/05/21 0320 05/06/21 0313 05/07/21  0330   RBC 2.37* 2.92* 3.10*   HEMOGLOBIN 7.1* 8.6* 9.4*   HEMATOCRIT 23.1* 27.5* 29.3*   PLATELETCT 326 337 357       Imaging  X-Ray:  I have personally reviewed the images and compared with prior images.  4/23 MICAH: large vegetations in MV and TV    Microbiology:  4/16 BCx 2/2 MSSA  4/17 BCx 2/2: negative  4/20 Trach aspirate: K. pneumonia  4/24 BAL: K. Pneumonia >100k cfu/ml.   4/25 BCx 2/2: negative  4/28 Pleural tissue (intraop OR specimen): K. Pneumonia. Pansensitive  5/4 BCx 2/2: pending, negative to date    Assessment/Plan  * Bacteremia due to methicillin susceptible Staphylococcus aureus (MSSA)- (present on admission)  Assessment & Plan  Source presumed right anterior chest  line/port with endocarditis  Multiple sources for MSSA including MV/TV vegetation, port, spinal stimulator/ shunt  Port has been removed  Last positive blood cultures were 4/17  Pt appeared to be on nafcillin from 4/16-4/23. Pt did receive MSSA therapy when she was in [facility].   ID following and treating MSSA bacteremia with Cefepime for CNS and lung pentretration.     Agitation requiring sedation protocol  Assessment & Plan  Patient was on propofol/fentanyl for approximately 2 weeks then versed and ketamine gtt.   Off versed and ketamine gtt 5/4  Increasing oxycodone to 20 Q4H. Will start Methadone 10mg Q8 for acute pain, gabapentin to limit IV narcotics and help wean dex gtt.  Consider starting clonidine for pain, blood pressure and anxiety to get off dex gtt       Trapped lung  Assessment & Plan  Decortication 4/28  Continue chest tubes to suction and hope lungs expand  R chest has had expanding somewhat.    There is now visible respiratory movement in the R chest and some improved expansion on CXR    Empyema of right pleural space (HCC)- (present on admission)  Assessment & Plan  R empyema due to septic pulmonary emboli  4/16 Chest tube placement at [facility]  4/18 Upsized by Dr Benedict  4/18-4/20 Received TPA/dornase.  Stopped b/c Hgb dropped requiring transfusion  4/26 was discontinued due to stopped functioning   4/28 s/p right VATS and decortication. Cx grew of Klebsiella pneumonia  May need another intervention in future, per Dr. Ganser  Has two chest tubes on the right and connect to -40 per surgery    Repeat CT next week        Acute bacterial endocarditis- (present on admission)  Assessment & Plan  MSSA bacteremia at OSH and here on 4/16. First negative blood cultures were on 4/17  Infected port removed at [facility]   shunt and spinal stimulator could be seeded, NSGY has consulted on both and don't recommend removal at admission due to HD instability.   MV and TV vegetations with septic pulmonary emboli  Nataly ID  Dr Omer/Beverley following.   K. Pneumonia empyema - on cefepime  Deemed not surgical candidate - No evidence based surgical indications (valvular CHF, difficult pathogen, etc) so no cardiovascular surgery consult is necessary at this time per prior note    Plan:   On cefepime for K. pneumonia  Received nafcilin from 4/16-4/22.   ID following    Follow up CT next week as surveillance     Acute respiratory failure with hypoxia (HCC)- (present on admission)  Assessment & Plan  4/15 Intubated for acute hypoxic respiratory failure at [facility] due to hydro/pneumo, trapped lung, lung abscesses  4/16 transferred to [facility]   4/16-4/28 Difficult to wean due to ongoing right loculated hydropneumothorax and lung abscesses, empyema. S/p 2 chest tubes  4/28 S/p right lung decortication by Dr. Ganser  5/1 s/p percutaneous tracheostomy    Concern of difficult to wean off sedation given prolonged sedation with prolonged intubation.   Pt was on versed gtt, scheduled oxycodone and dilaudid IV prn  4/30 ketamine gtt was started and planning to bridge and treat pain while weaning off versed    Plan:   Daily trach collar during day would rest on PS at night 8/8 with 's to prevent further de-recruitment of right trap lung  Continue diuresis with lasix 20 Q12H. Goal even to minimally negative fluid balance.   Tolerating tube feed at goal  GI was consulted, Dr. Moseley for possible PEG placement. Given pt appears to slowly recover, would like to give a chance for patient to recover before deciding for PEG.   Increase mobility       Acute encephalopathy- (present on admission)  Assessment & Plan  Likely multifactorial given CSF pleocytosis concerning of likely seeded spinal stimulator/ shunt, sedation  Pt has hx of  shunt for pseudotumor cerebri  Concern of autoimmune cerebritis was entertained but this was not confirmed.   Not candidate for MRI d/t spinal stimulator  EEG with encephalopathy and no seizures  Minimize  sedation as able  Daily SAT    Improved mouthing words writing on board      Pleural effusion, left  Assessment & Plan  Loculated fluid  Probably parapneumonic fluid from abscesses  CT guided chest tube placed 4/23, fluid sent  Lymph predominant, NGTD  TPA/dornase one dose on 4/24.  Hold d/t dropping Hgb  CT chest 4/25 with resolution of fluid  CT clamped 4/29-no significant accumulation of fluid so d/c 4/30    Fever  Assessment & Plan  Improved   Blood cultures repeated 5/5  Will need to revisit with NSGY regarding removal of spinal stimulator  On cefepime  ID following    Follow up CT's early next week    Pneumonia  Assessment & Plan  MSSA septic emboli with empyema   VAE  BAL 4/20 klebsiella pneumonia  BAL 4/24 still heavy growth of klebsiella  4/28 OR pleural fluid specimen growing klebsiella.   On cefepime    CSF pleocytosis- (present on admission)  Assessment & Plan  Status post lumbar puncture 4/13  WBC 53, 82% polys, Glucose 47, protein 97 Gram stain negative and culture negative at [facility]  Neurosurgery aware,  shunt in place  MRI brain requested by neurosurgery at [facility], not candidate d/t stimulator  Repeat ct no embolic lesions      Acute blood loss anemia  Assessment & Plan  Resolved  Hgb stable in 7-8s      S/P  shunt- (present on admission)  Assessment & Plan  History of pseudotumor cerebri  History of  shunt by Dr. Oh  Could be seeded by MSSA, Dr. Oh consulted at [facility], recommended MRI but unable given her spinal stimulator  Need to revisit this when patient is more stable      Goals of care, counseling/discussion  Assessment & Plan  Palliative is consulting  Dr. Sun spoke with Benoit on 4/27. I explained my concern that even if Enedelia were to survive this her QOL would likely be quite a bit worse.  She won't be able to have another port and was receiving IV meds for HA 3-4 times weekly.  She also could have worse headaches after this episode of meningitis.  He asked if she could have brain  damage, and I explained that was possible but hard to know right now.  He says part of him thinks Enedelia would want to keep fighting and part of him thinks she would say to stop aggressive care because her QOL was already very limited by her headaches. Continue aggressive care for now.  Dr Upton 5/4 spoke with  who's at bedside and updated him about patient's critical condition.     Spinal cord stimulator status  Assessment & Plan  Patient's stimulator is [employer]. It is FDA approved as MRI compatible, but the company has gone out of business, so there is no support team to assist with MRI.  Thus, if MRI were performed, our radiologists would need to protocol it correctly to manage the stimulator. The former rep from [employer] is Andre, [phone].  Information obtained from Dr. Mahoney's office, [phone].    Per  (5/4/2021), it is not MRI compatible unfortunately.     Pulmonary abscess (HCC)  Assessment & Plan  Septic emboli from TV endocarditis    Septic shock (HCC)- (present on admission)  Assessment & Plan  Shock resolved    Abnormal LFTs  Assessment & Plan  Chronic alk phos elevation  GGT high c/w liver source  Outpatient workup    Chronic pain syndrome- (present on admission)  Assessment & Plan  Chronic back pain and intractable headaches  History of nerve stimulator   Dr Vargas consulted at admission, no plan to remove stimulator due to her clinical instability  Multimodal with norco, gabapentin, methadone    Anxiety- (present on admission)  Assessment & Plan  Depakote, paxil, cymbalta and gabapentin     VTE:  lovenox  Ulcer: H2 Antagonist  Lines: Central Line  Ongoing indication addressed   Two chest tubes on the right    I have performed a physical exam and reviewed and updated ROS and Plan today (5/7/2021). In review of yesterday's note (5/6/2021), there are no changes except as documented above.       Discussed patient condition and risk of morbidity and/or mortality with RN, RT, Pharmacy,  Dietary, Code status disscussed and Charge nurse / hot rounds.      The patient remains critically ill requiring mechanical ventilation, difficulty weaning from vent, and dex gtt titration.  Critical care time = 41 minutes in directly providing and coordinating critical care and extensive data review.  No time overlap and excludes procedures

## 2021-05-07 NOTE — PROCEDURES
Vascular Access Team     Date of Insertion: 5/7/21  Arm Circumference: 30.5  Internal length: 39  External Length: 1  Vein Occupancy %: 35   Reason for PICC: antibiotic therapy  Labs: WBC 13.3, , BUN 17, Cr 0.21, GFR >60, INR NA     Consents confirmed, vessel patency confirmed with ultrasound. Risks and benefits of procedure explained to patient and education regarding central line associated bloodstream infections provided. Questions answered.      PICC placed in rue per licensed provider order with ultrasound guidance.  5 Fr, DOUBLE lumen PICC placed in BRACHIAL vein after 1 attempt(s). 2 mL of 1% lidocaine injected intradermally, 21 gauge microintroducer needle and modified Seldinger technique used. 39 cm catheter inserted with good blood return. Secured at 1 cm marker. Each lumen flushed without resistance with 10 mL 0.9% normal saline. PICC line secured with Biopatch and Tegaderm.     PICC tip placement location is confirmed by nurse to be in the Superior Vena Cava (SVC) utilizing 3CG technology. PICC line is appropriate for use at this time. Patient tolerated procedure well, without complications.  Patient condition relayed to unit RN or ordering physician via this post procedure note in the EMR.      Ultrasound images uploaded to PACS and viewable in the EMR - yes  Ultrasound imaged printed and placed in paper chart - no     [employer] PICC ref # [id number], Lot # [id number], Expiration Date 03/31/2022

## 2021-05-07 NOTE — CARE PLAN
Adult Ventilation Update    Total Vent Days: 22    Patient Lines/Drains/Airways Status    Active Airway     Name: Placement date: Placement time: Site: Days:    Airway Tracheostomy 8.0  05/01/21   1135   Tracheostomy  5                  T-piece-5/6-71/2 hours    Spont as [id number]     SPONT-10/8-30%

## 2021-05-07 NOTE — PROGRESS NOTES
Cortrak Placement    Tube Team verified patient name and medical record number prior to tube placement.  Cortrak tube (43 inches, 10 Tajik) placed at 60 cm in right nare.  Per Cortrak picture, tube appears to be in the stomach.  Nursing Instructions: Awaiting KUB to confirm placement before use for medications or feeding. Once placement confirmed, flush tube with 30 ml of water, and then remove and save stylet, in patient medication drawer.

## 2021-05-07 NOTE — PROGRESS NOTES
Infectious Disease Progress Note    Author: Brian Omer M.D. Date & Time created: 5/7/2021  10:51 AM  Cefepime 4/23 -  current Day #12     Thoracoscopy & Decortication - 4/28     PRIOR Rx:   Zosyn 4/22-4/23 S/P Thoracoscopy & Decortication Day #6  Previous: Unasyn, Zosyn, Vanco, Daptomycin  Ceftaroline: start 4/13-4/15  Nafcillin 2g Q4 hrs 4/15-4/23 4/14: Unable to give name of previous NSG or neurologist. Seems confused, but able to say some sentences fluently.   4/15: Line cultures now growing MSSA. As well as from the blood. Repeat blood culture 4/13+ in both sets. Patient has worsening confusion. CSF fluid analyses suggest pleocytosis. Cultures are no growth from the CSF. Chest CT was ordered this morning indicating a loculated right pleural effusion as well as bilateral septic emboli. Dr. Mack spoke with Dr. Oh yesterday and no surgical intervention unless clinical deterioration.  4/16: Increased respiratory distress.  Tachypneic.  CT with loculated collection.  Dr Resendiz not available.  No thoracic surgeon available.  Plans to have pulmonary place CT.  Transferring to ICU.  4/17: Transferred to [facility] last night. Hgb dropped to 6.4 requiring PRBC transfusion. Requiring 2 pressors. Fio2 50%. Febrile 38.8. Pending OR decortication of empyema and hemothorax. Chest tube placed at [facility] shows 8,371 WBC, ,000, 74% N  4/18: Chest tube was upsized by surgery yesterday, no decortication. WBC 22k. Fio2 40%. Vasopressin. Levophed down to 2mcg. Afebrile 24 hrs. Last fever 38.6 on 4/16.   4/19: Still on vent. Levo 3 mcg, vasopressin. Afebrile 72 hours. WBC 21k. BC 4/17 NGTD x 48 hours. Unable to do MRI brain given neurostimulator per RN.   4/20: Remaining afebrile. Hb 6.2 and undergoing transfusion today.WBC 24,100, 5% bands.1700 ml CT output. Copious bloody ET secretions. No pressors. Receiving dornase and TPA per CT. Right pleural effusion not large per CXR today.   4/21: WBC 31k. Bronchoscopy  yesterday showing blood. Cultures sent. TPA on hold per RN due to arvind blood from chest tube requiring PRBC transfusion for hgb 6. Pending chest CT today. Per , spinal stimulator is non-MRI compatible. Levophed 10mcg. 30% Fio2. Afebrile.   4/22: Fever 101.3 this am. WBC 20,300 down from 32,200 yesterday. BAL growing Klebsiella pneumoniae but susceptibility panel not set up until today. CTA of brain shows no lesion. CT of chest shows enlarging cavitary lung lesions bilat and large loculated new left pleural effusion and large hydropneumothorax on right. TPA & dornase infusions of right chest ended 4/20 after considerable bleeding requiring transfusion. Hb now down again to 6.8.  4/23: WBC 23k. Fio2 40%. Tmax 38.1. US of stiumlator shows small fluid collection but no drainable abscess. Pending MICAH today. Pending L chest tube placement  4/24: Continue fever 100.8-101.8. WBC 16,600. MICAH shows large vegetations on both tricuspid valve and mitral valve with mild TR & MR.  No aortic root abscess. Left chest tube placed yesterday yielding 8 ml of old bloody fluid. Bronch today revealed copious thick maroon secretions in distal trachea and both mainstem bronchi. On the right these were obstructive. Remaining off pressors. Last positive blood cultures (MSSA) were 4/16, negative 4/17.  4/25: Fever 100.6 this AM. wBC 13,300. Hb 6.6. CT: right hydropneumothorax increasing, right bronchopleural fistula, mediastinal shift ot the left , multiple cavitary pulmonary nodules, 3.1 cm left basilar mass, splenomegaly. CT of head shows dural thickening over the clivus. Lac+ GNR >100,000 col/ml growing from BAL of 4/24, presumed Klebsiella. Left peural fluid culture negative from 4/23.  4/26: Fever 100.4 last night. WBC 13,500. Hb 7.4. Plts 502,000. ESR >120. UA fairly clear except 30 mg% protein, 2-5 wbc and rare rbc. CXR unchanged except more prominent pulmonary edema. GNR in BAL may be a different species of Klebsiella, and  resistant to ampicillin & tmp-sulfa; confirmation pending.  4/28: Fever 100.8 last night. WBC 27,700. Hb 6.6. Plts 482,000. Creat 0.19. Kleb pneum in BAL on 4/24 is resistant to ampicillin & tmp-sulfa but otherwise sensitive. O2 sat 96% on FIO2 30% now, PEEP 8. Has been re-evaluated by thoracic surgeon, Dr. Ganser, who believes she will not wean without decortication on the right. Surgery anticipated today.  4/29: S/P right thoracoscopy with decortication with cultures NG at 24 hrs.  4/30: Had a bronch due to hypoxia and hypotension. CXR with worsening right hemithorax pulmonary opacities. Bloody mucous plugs removed. Pressors started. Febrile this am. Tachy in 130s. Pressors just removed. Tolerating vent, but not a SBT candidate at the moment.  5/1: Small air leak CT-2 every ~ 2/3 breathes. The K. pneumoniae from her thoracoscopy is sensitive to her cefepime so no antibiotic change needed.   5/2: No air leak today she tolerated 2  hrs of spontaneous breathing with support today.      5/3: Second day with SBT and doing well now for 2 hrs. Slight bump in temperature and      WBC's but no obvious clinical infectious changed so watch closely.      5/4: Fever still from time to time. WBCs trending up. Chest tubes in place. Trach.       5/5: She clearly is better today she was sitting up in bed with open eyes and follows commands. She still has low grade fever but her WBC's are dropping. Cefepime dose increased yesterday for increased CNS coverage if necessary. She will probably need further thoracic surgery as mentioned by Dr. Ganser. The issue of what to do with her stimulator is still up in the air for now as it probably will need to be removed but she is nort a candidate for more major surgery at this time.       5/7: She continues to improve and under go longer SBT trials. She just had a new PICC placed in her right arm and plan is to D/C central line.      Interval History:  Past 24 hrs reviewed with RN.    Labs  Reviewed, Medications Reviewed, Radiology Reviewed, Wound Reviewed, Fluids Reviewed and GI Nutrition Reviewed    Review of Systems:  ROS    Physical Exam:  Physical Exam    Labs:  Recent Results (from the past 24 hour(s))   CBC with Differential    Collection Time: 05/07/21  3:30 AM   Result Value Ref Range    WBC 13.3 (H) 4.8 - 10.8 K/uL    RBC 3.10 (L) 4.20 - 5.40 M/uL    Hemoglobin 9.4 (L) 12.0 - 16.0 g/dL    Hematocrit 29.3 (L) 37.0 - 47.0 %    MCV 94.5 81.4 - 97.8 fL    MCH 30.3 27.0 - 33.0 pg    MCHC 32.1 (L) 33.6 - 35.0 g/dL    RDW 67.1 (H) 35.9 - 50.0 fL    Platelet Count 357 164 - 446 K/uL    MPV 9.1 9.0 - 12.9 fL    Neutrophils-Polys 91.40 (H) 44.00 - 72.00 %    Lymphocytes 6.00 (L) 22.00 - 41.00 %    Monocytes 2.60 0.00 - 13.40 %    Eosinophils 0.00 0.00 - 6.90 %    Basophils 0.00 0.00 - 1.80 %    Nucleated RBC 0.00 /100 WBC    Neutrophils (Absolute) 12.16 (H) 2.00 - 7.15 K/uL    Lymphs (Absolute) 0.80 (L) 1.00 - 4.80 K/uL    Monos (Absolute) 0.35 0.00 - 0.85 K/uL    Eos (Absolute) 0.00 0.00 - 0.51 K/uL    Baso (Absolute) 0.00 0.00 - 0.12 K/uL    NRBC (Absolute) 0.00 K/uL    Anisocytosis 1+     Macrocytosis 1+    Basic Metabolic Panel (BMP)    Collection Time: 05/07/21  3:30 AM   Result Value Ref Range    Sodium 134 (L) 135 - 145 mmol/L    Potassium 4.1 3.6 - 5.5 mmol/L    Chloride 99 96 - 112 mmol/L    Co2 32 20 - 33 mmol/L    Glucose 100 (H) 65 - 99 mg/dL    Bun 17 8 - 22 mg/dL    Creatinine 0.21 (L) 0.50 - 1.40 mg/dL    Calcium 8.6 8.5 - 10.5 mg/dL    Anion Gap 3.0 (L) 7.0 - 16.0   MAGNESIUM    Collection Time: 05/07/21  3:30 AM   Result Value Ref Range    Magnesium 1.7 1.5 - 2.5 mg/dL   PHOSPHORUS    Collection Time: 05/07/21  3:30 AM   Result Value Ref Range    Phosphorus 2.8 2.5 - 4.5 mg/dL   ESTIMATED GFR    Collection Time: 05/07/21  3:30 AM   Result Value Ref Range    GFR If African American >60 >60 mL/min/1.73 m 2    GFR If Non African American >60 >60 mL/min/1.73 m 2   DIFFERENTIAL MANUAL     "Collection Time: 05/07/21  3:30 AM   Result Value Ref Range    Manual Diff Status PERFORMED    PERIPHERAL SMEAR REVIEW    Collection Time: 05/07/21  3:30 AM   Result Value Ref Range    Peripheral Smear Review see below    PLATELET ESTIMATE    Collection Time: 05/07/21  3:30 AM   Result Value Ref Range    Plt Estimation Normal    MORPHOLOGY    Collection Time: 05/07/21  3:30 AM   Result Value Ref Range    RBC Morphology Present      Results     Procedure Component Value Units Date/Time    BLOOD CULTURE [[id number]] Collected: 05/04/21 1150    Order Status: Completed Specimen: Blood from Peripheral Updated: 05/05/21 0726     Significant Indicator NEG     Source BLD     Site PERIPHERAL     Culture Result No Growth  Note: Blood cultures are incubated for 5 days and  are monitored continuously.Positive blood cultures  are called to the RN and reported as soon as  they are identified.      Narrative:      Collected By:[id number] FERNANDA WISE  Per Hospital Policy: Only change Specimen Src: to \"Line\" if  specified by physician order.  Right Hand    BLOOD CULTURE [[id number]] Collected: 05/04/21 1200    Order Status: Completed Specimen: Blood from Peripheral Updated: 05/05/21 0726     Significant Indicator NEG     Source BLD     Site PERIPHERAL     Culture Result No Growth  Note: Blood cultures are incubated for 5 days and  are monitored continuously.Positive blood cultures  are called to the RN and reported as soon as  they are identified.      Narrative:      Collected By:[id number] FERNANDA WISE  Per Hospital Policy: Only change Specimen Src: to \"Line\" if  specified by physician order.  Left AC    CULTURE TISSUE W/ GRM STAIN [[id number]]  (Abnormal)  (Susceptibility) Collected: 04/28/21 1711    Order Status: Completed Specimen: Tissue Updated: 05/01/21 1241     Significant Indicator POS     Source TISS     Site Right Pleural Debris     Culture Result -     Gram Stain Result Rare WBCs.  No organisms seen.       Culture " "Result Klebsiella pneumoniae  Light growth      Narrative:      Surgery Specimen    Susceptibility     Klebsiella pneumoniae (1)     Antibiotic Interpretation Microscan Method Status    Ceftriaxone Sensitive <=1 mcg/mL AUGUSTINA Final    Cefazolin Sensitive <=2 mcg/mL AUGUSTINA Final    Ciprofloxacin Sensitive <=0.25 mcg/mL AUGUSTINA Final    Cefepime Sensitive <=2 mcg/mL AUGUSTINA Final    Cefuroxime Sensitive <=4 mcg/mL AUGUSTINA Final    Ampicillin/sulbactam Sensitive 8/4 mcg/mL AUGUSTINA Final    Ertapenem Sensitive <=0.5 mcg/mL AUGUSTINA Final    Tobramycin Sensitive <=2 mcg/mL AUGUSTINA Final    Gentamicin Sensitive <=2 mcg/mL AUGUSTINA Final    Moxifloxacin Sensitive <=2 mcg/mL AUGUSTINA Final    Pip/Tazobactam Sensitive <=8 mcg/mL AUGUSTINA Final    Trimeth/Sulfa Resistant >2/38 mcg/mL AUGUSTINA Final    Tigecycline Sensitive <=2 mcg/mL AUGUSTINA Final                   Anaerobic Culture [[id number]] Collected: 04/28/21 1711    Order Status: Completed Specimen: Tissue Updated: 05/01/21 1241     Significant Indicator NEG     Source TISS     Site Right Pleural Debris     Culture Result No Anaerobes isolated.    Narrative:      Surgery Specimen    BLOOD CULTURE [[id number]] Collected: 04/25/21 1248    Order Status: Completed Specimen: Blood from Peripheral Updated: 04/30/21 1500     Significant Indicator NEG     Source BLD     Site PERIPHERAL     Culture Result No growth after 5 days of incubation.    Narrative:      Collected By:[id number] JAELYN TORRES  Per Hospital Policy: Only change Specimen Src: to \"Line\" if  specified by physician order.  Right Forearm/Arm    BLOOD CULTURE [[id number]] Collected: 04/25/21 1248    Order Status: Completed Specimen: Blood from Peripheral Updated: 04/30/21 1500     Significant Indicator NEG     Source BLD     Site PERIPHERAL     Culture Result No growth after 5 days of incubation.    Narrative:      Collected By:[id number] JAELYN TORRES  Per Hospital Policy: Only change Specimen Src: to \"Line\" if  specified by physician order.  Left Forearm/Arm "        Hemodynamics:  Temp (24hrs), Av.8 °C (98.3 °F), Min:35.9 °C (96.6 °F), Max:37.3 °C (99.1 °F)  Temperature: 35.9 °C (96.6 °F)  Pulse  Av.9  Min: 40  Max: 149   Blood Pressure: (!) 82/51    CVC Triple Lumen 21 Left Subclavian (Active)   Consider Removal of Femoral Line Not Applicable 21 08   Site Assessment Clean;Dry;Intact 21   Lumen 1 Status Blood return noted;Flushed;Normal saline locked;Scrubbed the hub prior to access 21   Lumen 3 Status Infusing 21   Lumen 2 Status Infusing 21   Dressing Type Biopatch;Occlusive;Transparent Film 21   Dressing Status Clean;Dry;Intact 21   Dressing Intervention N/A 21   Dressing Change Due 21 2000   Date Primary Tubing Changed 21 1000   Date Secondary Tubing Changed 21 1000   Date IV Connector(s) Changed 21 1000   NEXT Primary Tubing Change  21 0800   NEXT Secondary Tubing Change  21 0800   NEXT IV Connector(s) Change 21 0800   Waveform Not Applicable 21   Line Necessity Assessed Lack of Peripheral Access 21     Wound:  @WOUNDLDA(4)@     Fluids:  Intake/Output       21 - 21 0659 21 07 - 21 0659 21 07 - 21 Total  Total  Total       Intake    P.O.  0  -- 0  --  -- --  --  -- --    P.O. 0 -- 0 -- -- -- -- -- --    I.V.  89.9  58.5 148.4  170.5  -- 170.5  --  -- --    Magnesium Sulfate Volume 12.1 -- 12.1 51.3 -- 51.3 -- -- --    Precedex Volume 77.8 58.5 136.3 119.3 -- 119.3 -- -- --    Blood  --  0 0  --  -- --  --  -- --    Volume (RELEASE RED BLOOD CELLS) -- 0 0 -- -- -- -- -- --    Other  --  450 450  60  -- 60  --  -- --    Medications (PO/Enteral Liquids) -- 450 450 60 -- 60 -- -- --    NG/GT  450  -- 450  540  -- 540  --  -- --    Intake  (mL) (Enteral Tube 05/04/21 Cortrak - Gastric Right nare) 450 -- 450 540 -- 540 -- -- --    IV Piggyback  270  -- 270  --  -- --  --  -- --    Volume (mL) (cefepime (MAXIPIME) 2 g in  mL IVPB) 270 -- 270 -- -- -- -- -- --    Enteral  210  300 510  --  300 300  --  -- --    Free Water / Tube Flush 210 300 510 -- 300 300 -- -- --    Total Intake 1019.9 808.5 1828.4 770.5 300 1070.5 -- -- --       Output    Urine  970  900 1870  2300  750 3050  --  -- --    Output (mL) (Urethral Catheter Temperature probe)  2300 750 3050 -- -- --    Drains  0  -- 0  --  0 0  --  -- --    Residual Amount (mL) (Discarded) (Enteral Tube 05/04/21 Cortrak - Gastric Right nare) 0 -- 0 -- 0 0 -- -- --    Stool  --  -- --  --  -- --  --  -- --    Number of Times Stooled 1 x 1 x 2 x -- -- -- -- -- --    Emesis/NG output  --  0 0  --  -- --  --  -- --    Output (mL) (Enteral Tube 05/04/21 Cortrak - Gastric Right nare) -- 0 0 -- -- -- -- -- --    Chest Tube  10  0 10  --  30 30  --  -- --    Output (mL) (Chest Tube 2 Right Midaxillary) 10 -- 10 -- 30 30 -- -- --    Output (mL) (Chest Tube 1 Right Midaxillary;Pleural) 0 0 0 -- 0 0 -- -- --    Total Output  2300 780 3080 -- -- --       Net I/O     39.9 -91.5 -51.6 -1529.5 -480 -2009.5 -- -- --        Weight: 81.2 kg (179 lb 0.2 oz)  GI/Nutrition:  Orders Placed This Encounter   Procedures   • Diet NPO     Standing Status:   Standing     Number of Occurrences:   1     Order Specific Question:   Restrict to:     Answer:   Strict [1]     Medications:  Current Facility-Administered Medications   Medication Last Admin   • potassium chloride SA (Kdur) tablet 40 mEq Stopped at 05/07/21 0757   • HYDROmorphone (Dilaudid) injection 1-2 mg 2 mg at 05/07/21 0845   • gabapentin (NEURONTIN) capsule 200 mg 200 mg at 05/07/21 0547   • methadone (DOLOPHINE) 10 MG/ML solution 5 mg 5 mg at 05/07/21 0545   • oxyCODONE immediate release (ROXICODONE) tablet 20 mg 20 mg at 05/07/21 0547   •  traMADol (ULTRAM) 50 MG tablet 50 mg 50 mg at 05/06/21 2227   • dexmedetomidine (PRECEDEX) 400 mcg/100mL NS premix infusion 0.3 mcg/kg/hr at 05/06/21 2134   • cefepime (MAXIPIME) 2 g in  mL IVPB 2 g at 05/07/21 1026   • furosemide (LASIX) injection 20 mg 20 mg at 05/07/21 0547   • midazolam (Versed) 2 MG/2ML injection 2 mg 2 mg at 05/05/21 0420   • labetalol (NORMODYNE/TRANDATE) injection 10 mg 10 mg at 05/04/21 0226   • enoxaparin (LOVENOX) inj 40 mg 40 mg at 05/07/21 0546   • Pharmacy Consult: Enteral tube insertion - review meds/change route/product selection     • acetaminophen (Tylenol) tablet 650 mg 650 mg at 05/06/21 0153   • magnesium hydroxide (MILK OF MAGNESIA) suspension 30 mL      And   • senna-docusate (PERICOLACE or SENOKOT S) 8.6-50 MG per tablet 2 tablet 2 tablet at 05/07/21 0549    And   • polyethylene glycol/lytes (MIRALAX) PACKET 1 Packet      And   • bisacodyl (DULCOLAX) suppository 10 mg     • divalproex (DEPAKOTE SPRINKLE) capsule 500 mg 500 mg at 05/07/21 0548   • DULoxetine (CYMBALTA) capsule 60 mg 60 mg at 05/07/21 0548   • PARoxetine (PAXIL) tablet 10 mg 10 mg at 05/07/21 0549   • risperiDONE (RISPERDAL) tablet 2 mg 2 mg at 05/07/21 0547   • Respiratory Therapy Consult     • famotidine (PEPCID) tablet 20 mg 20 mg at 05/07/21 0547   • MD Alert...ICU Electrolyte Replacement per Pharmacy     • lidocaine (XYLOCAINE) 1 % injection 1-2 mL     • ipratropium-albuterol (DUONEB) nebulizer solution       Medical Decision Making, by Problem:  Active Hospital Problems    Diagnosis    • *Bacteremia due to methicillin susceptible Staphylococcus aureus (MSSA) [R78.81, B95.61]    • Agitation requiring sedation protocol [R45.1]    • 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]    • Acute respiratory failure with hypoxia (HCC) [J96.01]    • Acute bacterial endocarditis [I33.0]    • Empyema of right pleural space (HCC) [J86.9]    • Acute encephalopathy [G93.40]    • Fever [R50.9]    • Pleural effusion, left [J90]    •  Atelectasis [J98.11]    • CSF pleocytosis [D72.9]    • Pneumonia [J18.9]    • Tachycardia [R00.0]    • Pseudotumor cerebri [G93.2]    • S/P  shunt [Z98.2]    • Prolonged Q-T interval on ECG [R94.31]    • History of vasculitis [Z86.79]    • History of complicated migraines [G43.109]    • Hyponatremia [E87.1]    • H/O: CVA (cerebrovascular accident) [Z86.73]    • Chronic pain syndrome [G89.4]    • Thrombocytopenia (HCC) [D69.6]    • Anxiety [F41.9]    • Spinal cord stimulator status [Z96.89]    • Goals of care, counseling/discussion [Z71.89]    • Abnormal movement [G25.9]    • Pulmonary abscess (HCC) [J85.2]    • Anasarca [R60.1]    • Thrombocytosis (HCC) [D47.3]    • GERD (gastroesophageal reflux disease) [K21.9]    • Septic shock (HCC) [A41.9, R65.21]    • Anemia [D64.9]    • Hypokalemia [E87.6]      Impression   -Severe sepsis  -Catheter related bloodstream infection due to infected port status post removal 4/12-staph aureus  -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       - Acute left empyema s/p chest tube placement 4/23  -Multiple acute septic pulmonary emboli bilaterally  -Acute bilateral pneumonia due to above     --Pulmonary edema  -Pseudotumor cerebri with underlying  shunt: possible arachnoiditis  -Chronic migraine headaches  -History of autoimmune vasculitis  -Elevated alkaline phosphatase & bilirubin  -Acute encephalopathy due to sepsis      - anemia due to above      - New secondary Klebsiella pneumoniae pneumonia (presumed VAP)     Plan   - Persistent fever likely attributable to empyema and multiple lung abscesses. Currently covered. Source control.  - Continue cefepime  - Vent management per pulmonology - SBT trials  - Tolerated the decortication. Cx NGTD. Another surgery likely will become necessary. Per thoracic surgery.  - Now off pressors   - No IV abx changes or further ID  workup in the moment   - Continue to monitor closely     She clearly is continuing to get better sitting up in bed with open eyes and follows commands. She is afebrile now for greater than 24 hrs and her WBC's are dropping. Cefepime appears to be working. She will may need further thoracic surgery as mentioned by Dr. Ganser. The issue of what to do with her stimulator is still up in the air for now as it probably will need to be removed but she is nort a candidate for more major surgery at this time. She just had a new PICC placed in her right arm and plan is to D/C central line. Case and treatment reviewed with patient(best possible), micro and nursing> 35 min on floor in ICU with > 50% face to face view and 100% in direct patient care/counseling today.

## 2021-05-08 ENCOUNTER — APPOINTMENT (OUTPATIENT)
Dept: RADIOLOGY | Facility: MEDICAL CENTER | Age: 51
DRG: 003 | End: 2021-05-08
Attending: INTERNAL MEDICINE
Payer: MEDICARE

## 2021-05-08 LAB
ANION GAP SERPL CALC-SCNC: 6 MMOL/L (ref 7–16)
BASOPHILS # BLD AUTO: 0.5 % (ref 0–1.8)
BASOPHILS # BLD: 0.07 K/UL (ref 0–0.12)
BUN SERPL-MCNC: 16 MG/DL (ref 8–22)
CALCIUM SERPL-MCNC: 8.5 MG/DL (ref 8.5–10.5)
CHLORIDE SERPL-SCNC: 99 MMOL/L (ref 96–112)
CO2 SERPL-SCNC: 32 MMOL/L (ref 20–33)
CREAT SERPL-MCNC: <0.17 MG/DL (ref 0.5–1.4)
EOSINOPHIL # BLD AUTO: 0 K/UL (ref 0–0.51)
EOSINOPHIL NFR BLD: 0 % (ref 0–6.9)
ERYTHROCYTE [DISTWIDTH] IN BLOOD BY AUTOMATED COUNT: 63.7 FL (ref 35.9–50)
GLUCOSE SERPL-MCNC: 106 MG/DL (ref 65–99)
HCT VFR BLD AUTO: 29.1 % (ref 37–47)
HGB BLD-MCNC: 9.1 G/DL (ref 12–16)
IMM GRANULOCYTES # BLD AUTO: 0.16 K/UL (ref 0–0.11)
IMM GRANULOCYTES NFR BLD AUTO: 1.2 % (ref 0–0.9)
LYMPHOCYTES # BLD AUTO: 1.44 K/UL (ref 1–4.8)
LYMPHOCYTES NFR BLD: 11.2 % (ref 22–41)
MAGNESIUM SERPL-MCNC: 1.9 MG/DL (ref 1.5–2.5)
MCH RBC QN AUTO: 29.7 PG (ref 27–33)
MCHC RBC AUTO-ENTMCNC: 31.3 G/DL (ref 33.6–35)
MCV RBC AUTO: 95.1 FL (ref 81.4–97.8)
MONOCYTES # BLD AUTO: 0.84 K/UL (ref 0–0.85)
MONOCYTES NFR BLD AUTO: 6.5 % (ref 0–13.4)
NEUTROPHILS # BLD AUTO: 10.37 K/UL (ref 2–7.15)
NEUTROPHILS NFR BLD: 80.6 % (ref 44–72)
NRBC # BLD AUTO: 0 K/UL
NRBC BLD-RTO: 0 /100 WBC
PHOSPHATE SERPL-MCNC: 3.3 MG/DL (ref 2.5–4.5)
PLATELET # BLD AUTO: 341 K/UL (ref 164–446)
PMV BLD AUTO: 8.9 FL (ref 9–12.9)
POTASSIUM SERPL-SCNC: 4.2 MMOL/L (ref 3.6–5.5)
RBC # BLD AUTO: 3.06 M/UL (ref 4.2–5.4)
SODIUM SERPL-SCNC: 137 MMOL/L (ref 135–145)
WBC # BLD AUTO: 12.9 K/UL (ref 4.8–10.8)

## 2021-05-08 PROCEDURE — P9047 ALBUMIN (HUMAN), 25%, 50ML: HCPCS | Mod: JG | Performed by: INTERNAL MEDICINE

## 2021-05-08 PROCEDURE — 80048 BASIC METABOLIC PNL TOTAL CA: CPT

## 2021-05-08 PROCEDURE — 99291 CRITICAL CARE FIRST HOUR: CPT | Performed by: INTERNAL MEDICINE

## 2021-05-08 PROCEDURE — 700105 HCHG RX REV CODE 258: Performed by: INTERNAL MEDICINE

## 2021-05-08 PROCEDURE — 700101 HCHG RX REV CODE 250: Performed by: INTERNAL MEDICINE

## 2021-05-08 PROCEDURE — 94799 UNLISTED PULMONARY SVC/PX: CPT

## 2021-05-08 PROCEDURE — A9270 NON-COVERED ITEM OR SERVICE: HCPCS | Performed by: INTERNAL MEDICINE

## 2021-05-08 PROCEDURE — 94003 VENT MGMT INPAT SUBQ DAY: CPT

## 2021-05-08 PROCEDURE — 700102 HCHG RX REV CODE 250 W/ 637 OVERRIDE(OP): Performed by: INTERNAL MEDICINE

## 2021-05-08 PROCEDURE — 700111 HCHG RX REV CODE 636 W/ 250 OVERRIDE (IP): Performed by: INTERNAL MEDICINE

## 2021-05-08 PROCEDURE — 94640 AIRWAY INHALATION TREATMENT: CPT

## 2021-05-08 PROCEDURE — 83735 ASSAY OF MAGNESIUM: CPT

## 2021-05-08 PROCEDURE — 770022 HCHG ROOM/CARE - ICU (200)

## 2021-05-08 PROCEDURE — 84100 ASSAY OF PHOSPHORUS: CPT

## 2021-05-08 PROCEDURE — 71045 X-RAY EXAM CHEST 1 VIEW: CPT

## 2021-05-08 PROCEDURE — 85025 COMPLETE CBC W/AUTO DIFF WBC: CPT

## 2021-05-08 RX ORDER — ALBUMIN (HUMAN) 12.5 G/50ML
25 SOLUTION INTRAVENOUS ONCE
Status: COMPLETED | OUTPATIENT
Start: 2021-05-08 | End: 2021-05-08

## 2021-05-08 RX ORDER — CLONIDINE HYDROCHLORIDE 0.1 MG/1
0.1 TABLET ORAL EVERY 8 HOURS
Status: DISCONTINUED | OUTPATIENT
Start: 2021-05-08 | End: 2021-05-17

## 2021-05-08 RX ORDER — MAGNESIUM SULFATE HEPTAHYDRATE 40 MG/ML
2 INJECTION, SOLUTION INTRAVENOUS ONCE
Status: COMPLETED | OUTPATIENT
Start: 2021-05-08 | End: 2021-05-08

## 2021-05-08 RX ORDER — GABAPENTIN 300 MG/1
300 CAPSULE ORAL EVERY 8 HOURS
Status: DISCONTINUED | OUTPATIENT
Start: 2021-05-08 | End: 2021-05-17

## 2021-05-08 RX ADMIN — DIVALPROEX SODIUM 500 MG: 125 CAPSULE ORAL at 21:25

## 2021-05-08 RX ADMIN — OXYCODONE HYDROCHLORIDE 20 MG: 10 TABLET ORAL at 15:43

## 2021-05-08 RX ADMIN — DULOXETINE HYDROCHLORIDE 60 MG: 60 CAPSULE, DELAYED RELEASE ORAL at 05:57

## 2021-05-08 RX ADMIN — RISPERIDONE 2 MG: 1 TABLET, FILM COATED ORAL at 05:58

## 2021-05-08 RX ADMIN — POTASSIUM CHLORIDE 40 MEQ: 1500 TABLET, EXTENDED RELEASE ORAL at 05:58

## 2021-05-08 RX ADMIN — OXYCODONE HYDROCHLORIDE 20 MG: 10 TABLET ORAL at 19:29

## 2021-05-08 RX ADMIN — DULOXETINE HYDROCHLORIDE 60 MG: 60 CAPSULE, DELAYED RELEASE ORAL at 18:09

## 2021-05-08 RX ADMIN — HYDROMORPHONE HYDROCHLORIDE 2 MG: 1 INJECTION, SOLUTION INTRAMUSCULAR; INTRAVENOUS; SUBCUTANEOUS at 04:15

## 2021-05-08 RX ADMIN — CEFEPIME 2 G: 2 INJECTION, POWDER, FOR SOLUTION INTRAVENOUS at 10:31

## 2021-05-08 RX ADMIN — METHADONE HYDROCHLORIDE 5 MG: 10 CONCENTRATE ORAL at 05:55

## 2021-05-08 RX ADMIN — METHADONE HYDROCHLORIDE 5 MG: 10 CONCENTRATE ORAL at 21:26

## 2021-05-08 RX ADMIN — HYDROMORPHONE HYDROCHLORIDE 2 MG: 1 INJECTION, SOLUTION INTRAMUSCULAR; INTRAVENOUS; SUBCUTANEOUS at 16:36

## 2021-05-08 RX ADMIN — FAMOTIDINE 20 MG: 20 TABLET ORAL at 05:59

## 2021-05-08 RX ADMIN — FAMOTIDINE 20 MG: 20 TABLET ORAL at 18:09

## 2021-05-08 RX ADMIN — OXYCODONE HYDROCHLORIDE 20 MG: 10 TABLET ORAL at 02:44

## 2021-05-08 RX ADMIN — OXYCODONE HYDROCHLORIDE 20 MG: 10 TABLET ORAL at 10:31

## 2021-05-08 RX ADMIN — HYDROMORPHONE HYDROCHLORIDE 2 MG: 1 INJECTION, SOLUTION INTRAMUSCULAR; INTRAVENOUS; SUBCUTANEOUS at 08:23

## 2021-05-08 RX ADMIN — DIVALPROEX SODIUM 500 MG: 125 CAPSULE ORAL at 15:42

## 2021-05-08 RX ADMIN — PAROXETINE HYDROCHLORIDE 10 MG: 20 TABLET, FILM COATED ORAL at 05:57

## 2021-05-08 RX ADMIN — HYDROMORPHONE HYDROCHLORIDE 2 MG: 1 INJECTION, SOLUTION INTRAMUSCULAR; INTRAVENOUS; SUBCUTANEOUS at 12:36

## 2021-05-08 RX ADMIN — MAGNESIUM SULFATE 2 G: 2 INJECTION INTRAVENOUS at 08:23

## 2021-05-08 RX ADMIN — GABAPENTIN 300 MG: 300 CAPSULE ORAL at 21:26

## 2021-05-08 RX ADMIN — CLONIDINE HYDROCHLORIDE 0.1 MG: 0.1 TABLET ORAL at 21:26

## 2021-05-08 RX ADMIN — CLONIDINE HYDROCHLORIDE 0.1 MG: 0.1 TABLET ORAL at 12:13

## 2021-05-08 RX ADMIN — GABAPENTIN 200 MG: 100 CAPSULE ORAL at 05:59

## 2021-05-08 RX ADMIN — ALBUMIN (HUMAN) 25 G: 5 SOLUTION INTRAVENOUS at 07:56

## 2021-05-08 RX ADMIN — DIVALPROEX SODIUM 500 MG: 125 CAPSULE ORAL at 05:59

## 2021-05-08 RX ADMIN — DOCUSATE SODIUM 50 MG AND SENNOSIDES 8.6 MG 2 TABLET: 8.6; 5 TABLET, FILM COATED ORAL at 18:08

## 2021-05-08 RX ADMIN — FUROSEMIDE 20 MG: 10 INJECTION, SOLUTION INTRAMUSCULAR; INTRAVENOUS at 05:56

## 2021-05-08 RX ADMIN — DEXMEDETOMIDINE 0.4 MCG/KG/HR: 200 INJECTION, SOLUTION INTRAVENOUS at 16:27

## 2021-05-08 RX ADMIN — RISPERIDONE 2 MG: 1 TABLET, FILM COATED ORAL at 18:09

## 2021-05-08 RX ADMIN — OXYCODONE HYDROCHLORIDE 20 MG: 10 TABLET ORAL at 05:56

## 2021-05-08 RX ADMIN — METHADONE HYDROCHLORIDE 5 MG: 10 CONCENTRATE ORAL at 15:42

## 2021-05-08 RX ADMIN — CEFEPIME 2 G: 2 INJECTION, POWDER, FOR SOLUTION INTRAVENOUS at 02:44

## 2021-05-08 RX ADMIN — GABAPENTIN 300 MG: 300 CAPSULE ORAL at 15:43

## 2021-05-08 RX ADMIN — CEFEPIME 2 G: 2 INJECTION, POWDER, FOR SOLUTION INTRAVENOUS at 18:09

## 2021-05-08 RX ADMIN — ENOXAPARIN SODIUM 40 MG: 40 INJECTION SUBCUTANEOUS at 05:56

## 2021-05-08 ASSESSMENT — PAIN DESCRIPTION - PAIN TYPE
TYPE: CHRONIC PAIN;ACUTE PAIN
TYPE: ACUTE PAIN
TYPE: ACUTE PAIN
TYPE: CHRONIC PAIN;ACUTE PAIN
TYPE: ACUTE PAIN;CHRONIC PAIN
TYPE: ACUTE PAIN
TYPE: CHRONIC PAIN;ACUTE PAIN
TYPE: ACUTE PAIN
TYPE: ACUTE PAIN;CHRONIC PAIN
TYPE: CHRONIC PAIN;ACUTE PAIN
TYPE: ACUTE PAIN
TYPE: CHRONIC PAIN;ACUTE PAIN
TYPE: ACUTE PAIN
TYPE: CHRONIC PAIN;ACUTE PAIN

## 2021-05-08 ASSESSMENT — ENCOUNTER SYMPTOMS
ABDOMINAL PAIN: 0
FEVER: 0

## 2021-05-08 ASSESSMENT — FIBROSIS 4 INDEX: FIB4 SCORE: 0.71

## 2021-05-08 NOTE — PALLIATIVE CARE
Palliative Care follow-up  Stopped by pts room, pt currently sleeping in recliner, no family present. Checked in with Dr Lozano for any needs. PC RN will continue to remain available for pt and family for any needs.     Thank you for allowing Palliative Care to participate in this patient's care. Please feel free to call [phone] with any questions or concerns.

## 2021-05-08 NOTE — PROGRESS NOTES
Infectious Disease Progress Note    Author: Eric Lowery M.D. Date & Time created: 5/7/2021  8:29 PM     Cefepime 4/23 -  current Day #13     Thoracoscopy & Decortication - 4/28     PRIOR Rx:   Zosyn 4/22-4/23 S/P Thoracoscopy & Decortication Day #6  Previous: Unasyn, Zosyn, Vanco, Daptomycin  Ceftaroline: start 4/13-4/15  Nafcillin 2g Q4 hrs 4/15-4/23 4/14: Unable to give name of previous NSG or neurologist. Seems confused, but able to say some sentences fluently.   4/15: Line cultures now growing MSSA. As well as from the blood. Repeat blood culture 4/13+ in both sets. Patient has worsening confusion. CSF fluid analyses suggest pleocytosis. Cultures are no growth from the CSF. Chest CT was ordered this morning indicating a loculated right pleural effusion as well as bilateral septic emboli. Dr. Mack spoke with Dr. Oh yesterday and no surgical intervention unless clinical deterioration.  4/16: Increased respiratory distress.  Tachypneic.  CT with loculated collection.  Dr Resendiz not available.  No thoracic surgeon available.  Plans to have pulmonary place CT.  Transferring to ICU.  4/17: Transferred to [facility] last night. Hgb dropped to 6.4 requiring PRBC transfusion. Requiring 2 pressors. Fio2 50%. Febrile 38.8. Pending OR decortication of empyema and hemothorax. Chest tube [facility] at [facility] shows 8,371 WBC, ,000, 74% N  4/18: Chest tube was upsized by surgery yesterday, no decortication. WBC 22k. Fio2 40%. Vasopressin. Levophed down to 2mcg. Afebrile 24 hrs. Last fever 38.6 on 4/16.   4/19: Still on vent. Levo 3 mcg, vasopressin. Afebrile 72 hours. WBC 21k. BC 4/17 NGTD x 48 hours. Unable to do MRI brain given neurostimulator per RN.   4/20: Remaining afebrile. Hb 6.2 and undergoing transfusion today.WBC 24,100, 5% bands.1700 ml CT output. Copious bloody ET secretions. No pressors. Receiving dornase and TPA per CT. Right pleural effusion not large per CXR today.   4/21: WBC 31k. Bronchoscopy  yesterday showing blood. Cultures sent. TPA on hold per RN due to arvind blood from chest tube requiring PRBC transfusion for hgb 6. Pending chest CT today. Per , spinal stimulator is non-MRI compatible. Levophed 10mcg. 30% Fio2. Afebrile.   4/22: Fever 101.3 this am. WBC 20,300 down from 32,200 yesterday. BAL growing Klebsiella pneumoniae but susceptibility panel not set up until today. CTA of brain shows no lesion. CT of chest shows enlarging cavitary lung lesions bilat and large loculated new left pleural effusion and large hydropneumothorax on right. TPA & dornase infusions of right chest ended 4/20 after considerable bleeding requiring transfusion. Hb now down again to 6.8.  4/23: WBC 23k. Fio2 40%. Tmax 38.1. US of stiumlator shows small fluid collection but no drainable abscess. Pending MICAH today. Pending L chest tube placement  4/24: Continue fever 100.8-101.8. WBC 16,600. MICAH shows large vegetations on both tricuspid valve and mitral valve with mild TR & MR.  No aortic root abscess. Left chest tube placed yesterday yielding 8 ml of old bloody fluid. Bronch today revealed copious thick maroon secretions in distal trachea and both mainstem bronchi. On the right these were obstructive. Remaining off pressors. Last positive blood cultures (MSSA) were 4/16, negative 4/17.  4/25: Fever 100.6 this AM. wBC 13,300. Hb 6.6. CT: right hydropneumothorax increasing, right bronchopleural fistula, mediastinal shift ot the left , multiple cavitary pulmonary nodules, 3.1 cm left basilar mass, splenomegaly. CT of head shows dural thickening over the clivus. Lac+ GNR >100,000 col/ml growing from BAL of 4/24, presumed Klebsiella. Left peural fluid culture negative from 4/23.  4/26: Fever 100.4 last night. WBC 13,500. Hb 7.4. Plts 502,000. ESR >120. UA fairly clear except 30 mg% protein, 2-5 wbc and rare rbc. CXR unchanged except more prominent pulmonary edema. GNR in BAL may be a different species of Klebsiella, and  resistant to ampicillin & tmp-sulfa; confirmation pending.  4/28: Fever 100.8 last night. WBC 27,700. Hb 6.6. Plts 482,000. Creat 0.19. Kleb pneum in BAL on 4/24 is resistant to ampicillin & tmp-sulfa but otherwise sensitive. O2 sat 96% on FIO2 30% now, PEEP 8. Has been re-evaluated by thoracic surgeon, Dr. Ganser, who believes she will not wean without decortication on the right. Surgery anticipated today.  4/29: S/P right thoracoscopy with decortication with cultures NG at 24 hrs.  4/30: Had a bronch due to hypoxia and hypotension. CXR with worsening right hemithorax pulmonary opacities. Bloody mucous plugs removed. Pressors started. Febrile this am. Tachy in 130s. Pressors just removed. Tolerating vent, but not a SBT candidate at the moment.  5/1: Small air leak CT-2 every ~ 2/3 breathes. The K. pneumoniae from her thoracoscopy is sensitive to her cefepime so no antibiotic change needed.   5/2: No air leak today she tolerated 2  hrs of spontaneous breathing with support today.      5/3: Second day with SBT and doing well now for 2 hrs. Slight bump in temperature and      WBC's but no obvious clinical infectious changed so watch closely.      5/4: Fever still from time to time. WBCs trending up. Chest tubes in place. Trach.       5/5: She clearly is better today she was sitting up in bed with open eyes and follows commands. She still has low grade fever but her WBC's are dropping. Cefepime dose increased yesterday for increased CNS coverage if necessary. She will probably need further thoracic surgery as mentioned by Dr. Ganser. The issue of what to do with her stimulator is still up in the air for now as it probably will need to be removed but she is nort a candidate for more major surgery at this time.  5/6: Improved and responsive. Sitting cross-legged in chair. No fevers. Dr Ganser desires CT scan on Mon.     Interval History:  Past 24 hrs reviewed with staff.    Labs Reviewed, Medications Reviewed, Radiology  Reviewed, Wound Reviewed, Fluids Reviewed and GI Nutrition Reviewed    Review of Systems:  Review of Systems   Unable to perform ROS: Intubated   Respiratory: Positive for shortness of breath.    Gastrointestinal: Positive for abdominal pain.       Physical Exam:  Physical Exam  Vitals and nursing note reviewed.   Constitutional:       General: She is not in acute distress.     Appearance: She is obese. She is not toxic-appearing.   HENT:      Head: Normocephalic and atraumatic.   Cardiovascular:      Rate and Rhythm: Normal rate and regular rhythm.   Pulmonary:      Effort: Pulmonary effort is normal. No respiratory distress.      Comments: BS coarse clear on left with squeak/snap on right side.  Abdominal:      General: Abdomen is flat. There is no distension.      Palpations: Abdomen is soft.      Tenderness: There is abdominal tenderness (?). There is no guarding or rebound.   Skin:     General: Skin is warm and dry.   Neurological:      Mental Status: She is alert.      Comments: Alert to voice and follows commands.         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]    • Acute respiratory failure with hypoxia (HCC) [J96.01]    • Acute bacterial endocarditis [I33.0]    • Empyema of right pleural space (HCC) [J86.9]    • Acute encephalopathy [G93.40]    • Fever [R50.9]    • Pleural effusion, left [J90]    • Atelectasis [J98.11]    • CSF pleocytosis [D72.9]    • Pneumonia [J18.9]    • Tachycardia [R00.0]    • Pseudotumor cerebri [G93.2]    • S/P  shunt [Z98.2]    • Prolonged Q-T interval on ECG [R94.31]    • History of vasculitis [Z86.79]    • History of complicated migraines [G43.109]    • Hyponatremia [E87.1]    • H/O: CVA (cerebrovascular accident) [Z86.73]    • Chronic pain  syndrome [G89.4]    • Thrombocytopenia (HCC) [D69.6]    • Spinal cord stimulator status [Z96.89]    • Goals of care, counseling/discussion [Z71.89]    • Abnormal movement [G25.9]    • Pulmonary abscess (HCC) [J85.2]    • Anasarca [R60.1]    • Thrombocytosis (HCC) [D47.3]    • GERD (gastroesophageal reflux disease) [K21.9]    • Septic shock (HCC) [A41.9, R65.21]    • Anemia [D64.9]    • Hypokalemia [E87.6]      Impression   -Severe sepsis  -Catheter related bloodstream infection due to infected port status post removal 4/12-staph aureus  - MSSA  -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       - Acute left empyema s/p chest tube placement 4/23  -Multiple acute septic pulmonary emboli bilaterally  -Acute bilateral pneumonia due to above     --Pulmonary edema  -Pseudotumor cerebri with underlying  shunt: possible arachnoiditis  -Chronic migraine headaches  -History of autoimmune vasculitis  -Elevated alkaline phosphatase & bilirubin  -Acute encephalopathy due to sepsis      - anemia due to above      - New secondary Klebsiella pneumoniae pneumonia (presumed VAP)     Plan   - No new fevers for days  - Continue cefepime TID - clinical end point based on source control  - Vent management per pulmonology - SBT trials  - Tolerated the decortication. Cx NGTD. Another surgery likely will become necessary. Per thoracic surgery. Order for Scan by Dr Ganser for Monday  - Source control will likely need remain big issue - patient not great further surgical candidate  - No IV abx changes or further ID workup in the moment   - Continue to monitor closely. Clearly better  - Pain control remains issue.     Case and treatment reviewed with patient(best possible), Dr Lozano, Pharmacy, micro and nursing    > 35 min on floor in ICU with > 50% face to face view and 100% in direct patien care/counseling today.      [place]

## 2021-05-08 NOTE — PROGRESS NOTES
"Critical Care Progress Note    Date of admission  4/16/2021    Chief Complaint  50 y.o. female the past medical history of pseudotumor cerebri status post  shunt by Dr. Oh, chronic pain with history of placement of nerve stimulator, vasculitis, right anterior chest port for home IV meds with recurrent infection who presented 4/11/2021 with to [facility] with recurrent port infection and was initially treated with vancomycin and Zosyn and then changed to ceftaroline and subsequently switched to nafcillin when MSSA was identified.    Hospital Course  \"50 y.o. female the past medical history of pseudotumor cerebri status post  shunt by Dr. Oh, chronic pain with history of placement of nerve stimulator, vasculitis, right anterior chest port for home IV meds with recurrent infection who presented 4/11/2021 with to [facility] with recurrent port infection. Found to have MSSA bacteremia, endocarditis, septic pulmonary emboli/empyema/pneumatocele, and CSF pleocytosis concerning for  shunt involvement.  Seen by Dr. Oh, NSGY, at [facility] who did not recommend  shunt removal.  Seen by Dr. Vargas here for concern of fluid collection around her spinal stimulator and he recommended against removal. Remains intubated, encephalopathic.    4/16 admitted to ICU  4/17 VD 2, 2 FFP, pRBC overnight; new chest tube per gen surg  4/18 VD 3, TPa/Dornase with 1400 cc from R chest tube    4/26: R chest tube not functioning, d/c it.  broke his leg and going to OR today, so not available currently.  VD 11.  8/30%. RSBI immediately high on SBT attempt. Followed commands for RN  4/27: VD12. 8/30%. Not tolerating wean, high RSBI and tachycardic.   4/28: VD 13. VATS with Dr. Ganser.    4/29: VD 14. 8/30%. Still not tolerating wean d/t severe tachycardia just with SAT. Trialed precedex and she required fentanyl up to 600/hr to tolerate, so back on propofol. Chest tubes -40  4/30: VD 15. 8/30%. Try ketamine/versed as sedative. Severe " tachycardia and HTN with weaning down propofol. Plan for perc trach tomorrow. Chest tubes -40  5/1: perc trach. PICC placed. Chest tubes remain -40.  5/2: Able to spont well on sedation, but very tachy/HTN when sedation is turned down.  5/3 Versed weaned down to 3mg/min.   5/4 off versed and ketamine gtt  5/5 trials of SBT w/ trach  5/6 T piece trial during day, mobilize to chair x2, rest vent 8/8 at night, started methadone, gabapentin   5/7 T piece during day with, rest PS 8/8 overnight, spacing out dilaudid prn's    Interval Problem Update  Neuro: aox3 follows commands, anxiety, moves all, dex 0.4  HR: 100-120's  SBP: 's  Tmax: afebrile  GI: last BM 5/7, TF at goal   UOP: 550ml  Lines: picc line, CT x2, purwick  Resp: trach 8 t piece 40% 5 hrs yesterday, no secretions   Vte: lovenox  PPI/H2:pepcid  Antibx: Cefepime    D/c lasix   Albumin 50g 25% x1  Mag replace  CXR worse on right with de-recruitment  Gabapentin 300mg  Clonidine 0.1 mg TID      Review of Systems  Review of Systems   Unable to perform ROS: Intubated        Vital Signs for last 24 hours   Temp:  [35.9 °C (96.6 °F)-36.1 °C (97 °F)] 36.1 °C (97 °F)  Pulse:  [100-125] 113  Resp:  [13-28] 19  BP: ()/(44-94) 136/94  SpO2:  [92 %-98 %] 96 %    Hemodynamic parameters for last 24 hours       Respiratory Information for the last 24 hours  Vent Mode: Spont  PEEP/CPAP: 8  P Support: 10  MAP: 11  Control VTE (exp VT): 489    Physical Exam   Physical Exam  Vitals and nursing note reviewed.   Constitutional:       Appearance: She is ill-appearing.      Interventions: She is sedated and intubated.      Comments: Chronically ill appearing, trach in place   HENT:      Head:      Comments: Wasting temples     Mouth/Throat:      Mouth: Mucous membranes are dry.   Eyes:      Pupils: Pupils are equal, round, and reactive to light.   Neck:      Comments: Trach in place  8.0 Portex  Cardiovascular:      Rate and Rhythm: Regular rhythm. Tachycardia present.       Heart sounds: Murmur present.   Pulmonary:      Effort: No respiratory distress. She is intubated.      Breath sounds: No stridor. No wheezing or rhonchi.      Comments: Some air movement to right chest with course sounds, clear left without wheezing or rales.  Abdominal:      General: Bowel sounds are normal. There is no distension.      Tenderness: There is no abdominal tenderness. There is no guarding.   Musculoskeletal:      Cervical back: No rigidity or tenderness.      Right lower leg: Edema present.      Left lower leg: Edema present.      Comments: Edema to hands and feet   Skin:     General: Skin is warm and dry.      Capillary Refill: Capillary refill takes 2 to 3 seconds.      Coloration: Skin is pale.   Neurological:      Comments: Awake able to mouth words, shakes head yes and no, ext weak bilateral moves all extremities, no numbness to extremities.    Psychiatric:      Comments: anxious         Medications  Current Facility-Administered Medications   Medication Dose Route Frequency Provider Last Rate Last Admin   • gabapentin (NEURONTIN) capsule 300 mg  300 mg Enteral Tube Q8HRS Juventino Lozano M.D.       • cloNIDine (CATAPRES) tablet 0.1 mg  0.1 mg Enteral Tube Q8HRS Juventino Loazno M.D.       • HYDROmorphone (Dilaudid) injection 1-2 mg  1-2 mg Intravenous Q4HRS PRN Juventino Lozano M.D.   2 mg at 05/08/21 0823   • methadone (DOLOPHINE) 10 MG/ML solution 5 mg  5 mg Enteral Tube Q8HRS Juventino Lozano M.D.   5 mg at 05/08/21 0555   • oxyCODONE immediate release (ROXICODONE) tablet 20 mg  20 mg Enteral Tube Q4HRS Jonathan Upton D.O.   20 mg at 05/08/21 1031   • traMADol (ULTRAM) 50 MG tablet 50 mg  50 mg Enteral Tube Q6HRS PRN Jonathan Upton D.O.   50 mg at 05/06/21 2227   • dexmedetomidine (PRECEDEX) 400 mcg/100mL NS premix infusion  0.1-1.5 mcg/kg/hr Intravenous Continuous Jonathan Upton D.O.   Stopped at 05/08/21 0830   • cefepime (MAXIPIME) 2 g in  mL IVPB  2 g Intravenous Q8HRS Eric KHAN  ALFRED Lowery 200 mL/hr at 05/08/21 1031 2 g at 05/08/21 1031   • midazolam (Versed) 2 MG/2ML injection 2 mg  2 mg Intravenous Q HOUR PRN Leti Sun M.D.   2 mg at 05/05/21 0420   • labetalol (NORMODYNE/TRANDATE) injection 10 mg  10 mg Intravenous Q4HRS PRN Leti Sun M.D.   10 mg at 05/04/21 0226   • enoxaparin (LOVENOX) inj 40 mg  40 mg Subcutaneous DAILY Benoit Loya Jr., D.O.   40 mg at 05/08/21 0556   • Pharmacy Consult: Enteral tube insertion - review meds/change route/product selection  1 Each Other PHARMACY TO DOSE Elliott Salvador M.D.       • acetaminophen (Tylenol) tablet 650 mg  650 mg Enteral Tube Q4HRS PRN Elliott Salvador M.D.   650 mg at 05/06/21 0153   • magnesium hydroxide (MILK OF MAGNESIA) suspension 30 mL  30 mL Enteral Tube QDAY PRN Elliott Salvador M.D.        And   • senna-docusate (PERICOLACE or SENOKOT S) 8.6-50 MG per tablet 2 tablet  2 tablet Enteral Tube BID Elliott Salvador M.D.   Stopped at 05/08/21 0600    And   • polyethylene glycol/lytes (MIRALAX) PACKET 1 Packet  1 Packet Enteral Tube QDAY PRN Elliott Salvador M.D.        And   • bisacodyl (DULCOLAX) suppository 10 mg  10 mg Rectal QDAY PRN Elliott Salvador M.D.       • divalproex (DEPAKOTE SPRINKLE) capsule 500 mg  500 mg Enteral Tube Q8HRS Elliott Salvador M.D.   500 mg at 05/08/21 0559   • DULoxetine (CYMBALTA) capsule 60 mg  60 mg Enteral Tube BID Elliott Salvador M.D.   60 mg at 05/08/21 0557   • PARoxetine (PAXIL) tablet 10 mg  10 mg Enteral Tube QAM Elliott Salvador M.D.   10 mg at 05/08/21 0557   • risperiDONE (RISPERDAL) tablet 2 mg  2 mg Enteral Tube BID Elliott Salvador M.D.   2 mg at 05/08/21 0558   • Respiratory Therapy Consult   Nebulization Continuous RT Man Wahl M.D.       • famotidine (PEPCID) tablet 20 mg  20 mg Enteral Tube Q12HRS Man Wahl M.D.   20 mg at 05/08/21 0559   • MD Alert...ICU Electrolyte Replacement per Pharmacy   Other PHARMACY TO DOSE Man Wahl,  M.D.       • lidocaine (XYLOCAINE) 1 % injection 1-2 mL  1-2 mL Tracheal Tube Q30 MIN PRN Man Wahl M.D.       • ipratropium-albuterol (DUONEB) nebulizer solution  3 mL Nebulization Q2HRS PRN (RT) Man Wahl M.D.           Fluids    Intake/Output Summary (Last 24 hours) at 5/8/2021 1103  Last data filed at 5/8/2021 0800  Gross per 24 hour   Intake 1111.2 ml   Output 1630 ml   Net -518.8 ml       Laboratory          Recent Labs     05/06/21 0313 05/07/21  0330 05/08/21  0515   SODIUM 138 134* 137   POTASSIUM 3.9 4.1 4.2   CHLORIDE 103 99 99   CO2 29 32 32   BUN 17 17 16   CREATININE 0.18* 0.21* <0.17*   MAGNESIUM 1.7 1.7 1.9   PHOSPHORUS 3.1 2.8 3.3   CALCIUM 8.1* 8.6 8.5     Recent Labs     05/06/21 0313 05/07/21  0330 05/08/21  0515   GLUCOSE 108* 100* 106*     Recent Labs     05/06/21 0313 05/07/21  0330 05/08/21  0515   WBC 19.2* 13.3* 12.9*   NEUTSPOLYS 83.90* 91.40* 80.60*   LYMPHOCYTES 6.90* 6.00* 11.20*   MONOCYTES 7.50 2.60 6.50   EOSINOPHILS 0.00 0.00 0.00   BASOPHILS 0.50 0.00 0.50     Recent Labs     05/06/21 0313 05/07/21  0330 05/08/21  0515   RBC 2.92* 3.10* 3.06*   HEMOGLOBIN 8.6* 9.4* 9.1*   HEMATOCRIT 27.5* 29.3* 29.1*   PLATELETCT 337 357 341       Imaging  X-Ray:  I have personally reviewed the images and compared with prior images.  4/23 MICAH: large vegetations in MV and TV    Microbiology:  4/16 BCx 2/2 MSSA  4/17 BCx 2/2: negative  4/20 Trach aspirate: K. pneumonia  4/24 BAL: K. Pneumonia >100k cfu/ml.   4/25 BCx 2/2: negative  4/28 Pleural tissue (intraop OR specimen): K. Pneumonia. Pansensitive  5/4 BCx 2/2: pending, negative to date    Assessment/Plan  * Bacteremia due to methicillin susceptible Staphylococcus aureus (MSSA)- (present on admission)  Assessment & Plan  Source presumed right anterior chest line/port with endocarditis  Multiple sources for MSSA including MV/TV vegetation, port, spinal stimulator/ shunt  Port has been removed  Last positive blood cultures  were 4/17  Pt appeared to be on nafcillin from 4/16-4/23. Pt did receive MSSA therapy when she was in [facility].   ID following and treating MSSA bacteremia with Cefepime for CNS and lung pentretration.     Agitation requiring sedation protocol  Assessment & Plan  Patient was on propofol/fentanyl for approximately 2 weeks then versed and ketamine gtt.   Off versed and ketamine gtt 5/4  Increasing oxycodone to 20 Q4H. Will start Methadone 10mg Q8 for acute pain, gabapentin to limit IV narcotics and help wean dex gtt.  Consider starting clonidine for pain -> start 5/8 blood pressure and anxiety to get off dex gtt         Trapped lung  Assessment & Plan  Decortication 4/28  Continue chest tubes to suction and hope lungs expand  R chest has had expanding somewhat.    There is now visible respiratory movement in the R chest and some improved expansion on CXR    Empyema of right pleural space (HCC)- (present on admission)  Assessment & Plan  R empyema due to septic pulmonary emboli  4/16 Chest tube placement at [facility]  4/18 Upsized by Dr Benedict  4/18-4/20 Received TPA/dornase.  Stopped b/c Hgb dropped requiring transfusion  4/26 was discontinued due to stopped functioning   4/28 s/p right VATS and decortication. Cx grew of Klebsiella pneumonia  May need another intervention in future, per Dr. Ganser  Has two chest tubes on the right and connect to -40 per surgery    Repeat CT next week        Acute bacterial endocarditis- (present on admission)  Assessment & Plan  MSSA bacteremia at OSH and here on 4/16. First negative blood cultures were on 4/17  Infected port removed at [facility]   shunt and spinal stimulator could be seeded, NSGY has consulted on both and don't recommend removal at admission due to HD instability.   MV and TV vegetations with septic pulmonary emboli  Nataly ID Dr Omer/Beverley following.   K. Pneumonia empyema - on cefepime  Deemed not surgical candidate - No evidence based surgical indications (valvular CHF,  difficult pathogen, etc) so no cardiovascular surgery consult is necessary at this time per prior note    Plan:   On cefepime for K. pneumonia  Received nafcilin from 4/16-4/22.   ID following    Follow up CT next week as surveillance     Acute respiratory failure with hypoxia (HCC)- (present on admission)  Assessment & Plan  4/15 Intubated for acute hypoxic respiratory failure at [facility] due to hydro/pneumo, trapped lung, lung abscesses  4/16 transferred to [facility]   4/16-4/28 Difficult to wean due to ongoing right loculated hydropneumothorax and lung abscesses, empyema. S/p 2 chest tubes  4/28 S/p right lung decortication by Dr. Ganser  5/1 s/p percutaneous tracheostomy    Concern of difficult to wean off sedation given prolonged sedation with prolonged intubation.   Pt was on versed gtt, scheduled oxycodone and dilaudid IV prn  4/30 ketamine gtt was started and planning to bridge and treat pain while weaning off versed    Plan:   Daily trach collar during day would rest on PS at night 8/8 with 's to prevent further de-recruitment of right trap lung  Continue diuresis with lasix 20 Q12H. Goal even to minimally negative fluid balance.   Tolerating tube feed at goal  GI was consulted, Dr. Moseley for possible PEG placement. Given pt appears to slowly recover, would like to give a chance for patient to recover before deciding for PEG.   Increase mobility       Acute encephalopathy- (present on admission)  Assessment & Plan  Likely multifactorial given CSF pleocytosis concerning of likely seeded spinal stimulator/ shunt, sedation  Pt has hx of  shunt for pseudotumor cerebri  Concern of autoimmune cerebritis was entertained but this was not confirmed.   Not candidate for MRI d/t spinal stimulator  EEG with encephalopathy and no seizures  Minimize sedation as able  Daily SAT    Improved mouthing words writing on board      Pleural effusion, left  Assessment & Plan  Loculated fluid  Probably parapneumonic  fluid from abscesses  CT guided chest tube placed 4/23, fluid sent  Lymph predominant, NGTD  TPA/dornase one dose on 4/24.  Hold d/t dropping Hgb  CT chest 4/25 with resolution of fluid  CT clamped 4/29-no significant accumulation of fluid so d/c 4/30    Pneumonia  Assessment & Plan  MSSA septic emboli with empyema   VAE  BAL 4/20 klebsiella pneumonia  BAL 4/24 still heavy growth of klebsiella  4/28 OR pleural fluid specimen growing klebsiella.   On cefepime    CSF pleocytosis- (present on admission)  Assessment & Plan  Status post lumbar puncture 4/13  WBC 53, 82% polys, Glucose 47, protein 97 Gram stain negative and culture negative at [facility]  Neurosurgery aware,  shunt in place  MRI brain requested by neurosurgery at [facility], not candidate d/t stimulator  Repeat ct no embolic lesions      Acute blood loss anemia  Assessment & Plan  Resolved  Hgb stable in 7-8s      S/P  shunt- (present on admission)  Assessment & Plan  History of pseudotumor cerebri  History of  shunt by Dr. Oh  Could be seeded by MSSA, Dr. Oh consulted at [facility], recommended MRI but unable given her spinal stimulator  Need to revisit this when patient is more stable      Goals of care, counseling/discussion  Assessment & Plan  Palliative is consulting  Dr. Sun spoke with Benoit on 4/27. I explained my concern that even if Enedelia were to survive this her QOL would likely be quite a bit worse.  She won't be able to have another port and was receiving IV meds for HA 3-4 times weekly.  She also could have worse headaches after this episode of meningitis.  He asked if she could have brain damage, and I explained that was possible but hard to know right now.  He says part of him thinks Enedelia would want to keep fighting and part of him thinks she would say to stop aggressive care because her QOL was already very limited by her headaches. Continue aggressive care for now.  Dr Upton 5/4 spoke with  who's at bedside and updated him about  patient's critical condition.     Spinal cord stimulator status  Assessment & Plan  Patient's stimulator is [employer]. It is FDA approved as MRI compatible, but the company has gone out of business, so there is no support team to assist with MRI.  Thus, if MRI were performed, our radiologists would need to protocol it correctly to manage the stimulator. The former rep from [employer] is Andre, [phone].  Information obtained from Dr. Mahoney's office, [phone].    Per  (5/4/2021), it is not MRI compatible unfortunately.     Pulmonary abscess (HCC)  Assessment & Plan  Septic emboli from TV endocarditis    Septic shock (HCC)- (present on admission)  Assessment & Plan  Shock resolved    Abnormal LFTs  Assessment & Plan  Chronic alk phos elevation  GGT high c/w liver source  Outpatient workup    Chronic pain syndrome- (present on admission)  Assessment & Plan  Chronic back pain and intractable headaches  History of nerve stimulator   Dr Vargas consulted at admission, no plan to remove stimulator due to her clinical instability  Multimodal with norco, gabapentin, methadone, clonidine    Anxiety- (present on admission)  Assessment & Plan  Depakote, paxil, cymbalta, gabapentin, clonidine, dex gtt     VTE:  lovenox  Ulcer: H2 Antagonist  Lines: None Two chest tubes on the right, PICC line    I have performed a physical exam and reviewed and updated ROS and Plan today (5/8/2021). In review of yesterday's note (5/7/2021), there are no changes except as documented above.       Discussed patient condition and risk of morbidity and/or mortality with RN, RT, Pharmacy, Dietary, Code status disscussed and Charge nurse / hot rounds.      The patient remains critically ill requiring mechanical ventilation, difficulty weaning from vent, and dex gtt titration.  Critical care time = 37 minutes in directly providing and coordinating critical care and extensive data review.  No time overlap and excludes procedures

## 2021-05-08 NOTE — RESPIRATORY CARE
Ventilator Daily Summary     Vent Day # 23, trach day 8     On spont 5/8 30% tolerated well.   Placed on T-piece 10L @ 40%, tolerated for 5.5 hours. Patient requested to be back on the vent due to being tired. Placed back on spont settings.       Plan: Continue current ventilator settings and wean mechanical ventilation as tolerated per physician orders

## 2021-05-08 NOTE — PROGRESS NOTES
Infectious Disease Progress Note    Author: Eric Lowery M.D. Date & Time created: 5/8/2021  10:59 AM  Cefepime 4/23 -  current Day #13     Thoracoscopy & Decortication - 4/28     PRIOR Rx:   Zosyn 4/22-4/23 S/P Thoracoscopy & Decortication Day #6  Previous: Unasyn, Zosyn, Vanco, Daptomycin  Ceftaroline: start 4/13-4/15  Nafcillin 2g Q4 hrs 4/15-4/23 4/14: Unable to give name of previous NSG or neurologist. Seems confused, but able to say some sentences fluently.   4/15: Line cultures now growing MSSA. As well as from the blood. Repeat blood culture 4/13+ in both sets. Patient has worsening confusion. CSF fluid analyses suggest pleocytosis. Cultures are no growth from the CSF. Chest CT was ordered this morning indicating a loculated right pleural effusion as well as bilateral septic emboli. Dr. Mack spoke with Dr. Oh yesterday and no surgical intervention unless clinical deterioration.  4/16: Increased respiratory distress.  Tachypneic.  CT with loculated collection.  Dr Resendiz not available.  No thoracic surgeon available.  Plans to have pulmonary place CT.  Transferring to ICU.  4/17: Transferred to [facility] last night. Hgb dropped to 6.4 requiring PRBC transfusion. Requiring 2 pressors. Fio2 50%. Febrile 38.8. Pending OR decortication of empyema and hemothorax. Chest tube placed at [facility] shows 8,371 WBC, ,000, 74% N  4/18: Chest tube was upsized by surgery yesterday, no decortication. WBC 22k. Fio2 40%. Vasopressin. Levophed down to 2mcg. Afebrile 24 hrs. Last fever 38.6 on 4/16.   4/19: Still on vent. Levo 3 mcg, vasopressin. Afebrile 72 hours. WBC 21k. BC 4/17 NGTD x 48 hours. Unable to do MRI brain given neurostimulator per RN.   4/20: Remaining afebrile. Hb 6.2 and undergoing transfusion today.WBC 24,100, 5% bands.1700 ml CT output. Copious bloody ET secretions. No pressors. Receiving dornase and TPA per CT. Right pleural effusion not large per CXR today.   4/21: WBC 31k. Bronchoscopy  yesterday showing blood. Cultures sent. TPA on hold per RN due to arvind blood from chest tube requiring PRBC transfusion for hgb 6. Pending chest CT today. Per , spinal stimulator is non-MRI compatible. Levophed 10mcg. 30% Fio2. Afebrile.   4/22: Fever 101.3 this am. WBC 20,300 down from 32,200 yesterday. BAL growing Klebsiella pneumoniae but susceptibility panel not set up until today. CTA of brain shows no lesion. CT of chest shows enlarging cavitary lung lesions bilat and large loculated new left pleural effusion and large hydropneumothorax on right. TPA & dornase infusions of right chest ended 4/20 after considerable bleeding requiring transfusion. Hb now down again to 6.8.  4/23: WBC 23k. Fio2 40%. Tmax 38.1. US of stiumlator shows small fluid collection but no drainable abscess. Pending MICAH today. Pending L chest tube placement  4/24: Continue fever 100.8-101.8. WBC 16,600. MICAH shows large vegetations on both tricuspid valve and mitral valve with mild TR & MR.  No aortic root abscess. Left chest tube placed yesterday yielding 8 ml of old bloody fluid. Bronch today revealed copious thick maroon secretions in distal trachea and both mainstem bronchi. On the right these were obstructive. Remaining off pressors. Last positive blood cultures (MSSA) were 4/16, negative 4/17.  4/25: Fever 100.6 this AM. wBC 13,300. Hb 6.6. CT: right hydropneumothorax increasing, right bronchopleural fistula, mediastinal shift ot the left , multiple cavitary pulmonary nodules, 3.1 cm left basilar mass, splenomegaly. CT of head shows dural thickening over the clivus. Lac+ GNR >100,000 col/ml growing from BAL of 4/24, presumed Klebsiella. Left peural fluid culture negative from 4/23.  4/26: Fever 100.4 last night. WBC 13,500. Hb 7.4. Plts 502,000. ESR >120. UA fairly clear except 30 mg% protein, 2-5 wbc and rare rbc. CXR unchanged except more prominent pulmonary edema. GNR in BAL may be a different species of Klebsiella, and  resistant to ampicillin & tmp-sulfa; confirmation pending.  4/28: Fever 100.8 last night. WBC 27,700. Hb 6.6. Plts 482,000. Creat 0.19. Kleb pneum in BAL on 4/24 is resistant to ampicillin & tmp-sulfa but otherwise sensitive. O2 sat 96% on FIO2 30% now, PEEP 8. Has been re-evaluated by thoracic surgeon, Dr. Ganser, who believes she will not wean without decortication on the right. Surgery anticipated today.  4/29: S/P right thoracoscopy with decortication with cultures NG at 24 hrs.  4/30: Had a bronch due to hypoxia and hypotension. CXR with worsening right hemithorax pulmonary opacities. Bloody mucous plugs removed. Pressors started. Febrile this am. Tachy in 130s. Pressors just removed. Tolerating vent, but not a SBT candidate at the moment.  5/1: Small air leak CT-2 every ~ 2/3 breathes. The K. pneumoniae from her thoracoscopy is sensitive to her cefepime so no antibiotic change needed.   5/2: No air leak today she tolerated 2  hrs of spontaneous breathing with support today.      5/3: Second day with SBT and doing well now for 2 hrs. Slight bump in temperature and      WBC's but no obvious clinical infectious changed so watch closely.      5/4: Fever still from time to time. WBCs trending up. Chest tubes in place. Trach.       5/5: She clearly is better today she was sitting up in bed with open eyes and follows commands. She still has low grade fever but her WBC's are dropping. Cefepime dose increased yesterday for increased CNS coverage if necessary. She will probably need further thoracic surgery as mentioned by Dr. Ganser. The issue of what to do with her stimulator is still up in the air for now as it probably will need to be removed but she is nort a candidate for more major surgery at this time.       5/7: She continues to improve and under go longer SBT trials. She just had a new PICC placed in her right arm and plan is to D/C central line.        5/8: PICC placed. No fevers. Comfortable. Cortrak placed.  Failing SBTs.    Interval History:  Past 24 hrs reviewed with RN.    Labs Reviewed, Medications Reviewed, Radiology Reviewed, Wound Reviewed, Fluids Reviewed and GI Nutrition Reviewed    Review of Systems:  Review of Systems   Constitutional: Negative for fever.   Cardiovascular: Negative for chest pain.   Gastrointestinal: Negative for abdominal pain.       Physical Exam:  Physical Exam  Constitutional:       Appearance: Normal appearance.   HENT:      Nose:      Comments: NG  Neck:      Comments: ET  Cardiovascular:      Rate and Rhythm: Normal rate and regular rhythm.   Pulmonary:      Breath sounds: Rhonchi present.   Abdominal:      General: Abdomen is flat.   Skin:     General: Skin is warm and dry.   Neurological:      General: No focal deficit present.      Mental Status: She is alert.         Labs:  Recent Results (from the past 24 hour(s))   CBC with Differential    Collection Time: 05/08/21  5:15 AM   Result Value Ref Range    WBC 12.9 (H) 4.8 - 10.8 K/uL    RBC 3.06 (L) 4.20 - 5.40 M/uL    Hemoglobin 9.1 (L) 12.0 - 16.0 g/dL    Hematocrit 29.1 (L) 37.0 - 47.0 %    MCV 95.1 81.4 - 97.8 fL    MCH 29.7 27.0 - 33.0 pg    MCHC 31.3 (L) 33.6 - 35.0 g/dL    RDW 63.7 (H) 35.9 - 50.0 fL    Platelet Count 341 164 - 446 K/uL    MPV 8.9 (L) 9.0 - 12.9 fL    Neutrophils-Polys 80.60 (H) 44.00 - 72.00 %    Lymphocytes 11.20 (L) 22.00 - 41.00 %    Monocytes 6.50 0.00 - 13.40 %    Eosinophils 0.00 0.00 - 6.90 %    Basophils 0.50 0.00 - 1.80 %    Immature Granulocytes 1.20 (H) 0.00 - 0.90 %    Nucleated RBC 0.00 /100 WBC    Neutrophils (Absolute) 10.37 (H) 2.00 - 7.15 K/uL    Lymphs (Absolute) 1.44 1.00 - 4.80 K/uL    Monos (Absolute) 0.84 0.00 - 0.85 K/uL    Eos (Absolute) 0.00 0.00 - 0.51 K/uL    Baso (Absolute) 0.07 0.00 - 0.12 K/uL    Immature Granulocytes (abs) 0.16 (H) 0.00 - 0.11 K/uL    NRBC (Absolute) 0.00 K/uL   Basic Metabolic Panel (BMP)    Collection Time: 05/08/21  5:15 AM   Result Value Ref Range    Sodium  "137 135 - 145 mmol/L    Potassium 4.2 3.6 - 5.5 mmol/L    Chloride 99 96 - 112 mmol/L    Co2 32 20 - 33 mmol/L    Glucose 106 (H) 65 - 99 mg/dL    Bun 16 8 - 22 mg/dL    Creatinine <0.17 (L) 0.50 - 1.40 mg/dL    Calcium 8.5 8.5 - 10.5 mg/dL    Anion Gap 6.0 (L) 7.0 - 16.0   MAGNESIUM    Collection Time: 05/08/21  5:15 AM   Result Value Ref Range    Magnesium 1.9 1.5 - 2.5 mg/dL   PHOSPHORUS    Collection Time: 05/08/21  5:15 AM   Result Value Ref Range    Phosphorus 3.3 2.5 - 4.5 mg/dL   ESTIMATED GFR    Collection Time: 05/08/21  5:15 AM   Result Value Ref Range    GFR If African American >60 >60 mL/min/1.73 m 2    GFR If Non African American >60 >60 mL/min/1.73 m 2     Results     Procedure Component Value Units Date/Time    BLOOD CULTURE [[id number]] Collected: 05/04/21 1150    Order Status: Completed Specimen: Blood from Peripheral Updated: 05/05/21 0726     Significant Indicator NEG     Source BLD     Site PERIPHERAL     Culture Result No Growth  Note: Blood cultures are incubated for 5 days and  are monitored continuously.Positive blood cultures  are called to the RN and reported as soon as  they are identified.      Narrative:      Collected By:[id number] FERNANDA WISE  Per Hospital Policy: Only change Specimen Src: to \"Line\" if  specified by physician order.  Right Hand    BLOOD CULTURE [[id number]] Collected: 05/04/21 1200    Order Status: Completed Specimen: Blood from Peripheral Updated: 05/05/21 0726     Significant Indicator NEG     Source BLD     Site PERIPHERAL     Culture Result No Growth  Note: Blood cultures are incubated for 5 days and  are monitored continuously.Positive blood cultures  are called to the RN and reported as soon as  they are identified.      Narrative:      Collected By:[id number] FERNANDA WISE  Per Hospital Policy: Only change Specimen Src: to \"Line\" if  specified by physician order.  Left AC    CULTURE TISSUE W/ GRM STAIN [[id number]]  (Abnormal)  (Susceptibility) Collected: " 21    Order Status: Completed Specimen: Tissue Updated: 21 124     Significant Indicator POS     Source TISS     Site Right Pleural Debris     Culture Result -     Gram Stain Result Rare WBCs.  No organisms seen.       Culture Result Klebsiella pneumoniae  Light growth      Narrative:      Surgery Specimen    Susceptibility     Klebsiella pneumoniae (1)     Antibiotic Interpretation Microscan Method Status    Ceftriaxone Sensitive <=1 mcg/mL AUGUSTINA Final    Cefazolin Sensitive <=2 mcg/mL AUGUSTINA Final    Ciprofloxacin Sensitive <=0.25 mcg/mL AUGUSTINA Final    Cefepime Sensitive <=2 mcg/mL AUGUSTINA Final    Cefuroxime Sensitive <=4 mcg/mL AUGUSTINA Final    Ampicillin/sulbactam Sensitive 8/4 mcg/mL AUGUSTINA Final    Ertapenem Sensitive <=0.5 mcg/mL AUGUSTINA Final    Tobramycin Sensitive <=2 mcg/mL AUGUSTINA Final    Gentamicin Sensitive <=2 mcg/mL AUGUSTINA Final    Moxifloxacin Sensitive <=2 mcg/mL AUGUSTINA Final    Pip/Tazobactam Sensitive <=8 mcg/mL AUGUSTINA Final    Trimeth/Sulfa Resistant >2/38 mcg/mL AUGUSTINA Final    Tigecycline Sensitive <=2 mcg/mL AUGUSTINA Final                   Anaerobic Culture [[id number]] Collected: 21    Order Status: Completed Specimen: Tissue Updated: 21 124     Significant Indicator NEG     Source TISS     Site Right Pleural Debris     Culture Result No Anaerobes isolated.    Narrative:      Surgery Specimen        Hemodynamics:  Temp (24hrs), Av °C (96.8 °F), Min:35.9 °C (96.6 °F), Max:36.1 °C (97 °F)  Temperature: 36.1 °C (97 °F)  Pulse  Av.2  Min: 40  Max: 149   Blood Pressure: 136/94    PICC Double Lumen 21 Lumen 1: Purple Lumen 2: Red Right Brachial (Active)   Site Assessment Clean;Dry;Intact 21 0800   Lumen 1 Status Blood return noted;Flushed;Normal saline locked 21 0800   Lumen 2 Status Blood return noted;Flushed;Normal saline locked 21 08   Line Secured at (cm) 1 cm 21 1118   Extremity Circumference (cm) 30.5 cm 21 1118   Dressing Type Biopatch;Securing  device;Skin barrier;Transparent 05/08/21 0800   Dressing Status Clean;Dry;Intact 05/08/21 0800   Dressing Intervention N/A 05/07/21 2000   Dressing Change Due 05/08/21 05/08/21 0800   Date IV Connector(s) Changed 05/07/21 05/07/21 1118   NEXT IV Connector(s) Change 05/08/21 05/07/21 1118   Line Necessity Assessed Antibiotic Therapy Greater than 7 Days 05/08/21 0800   $ Double Lumen PICC Charge Single kit used 05/07/21 1118     Wound:  @WOUNDLDA(4)@     Fluids:  Intake/Output       05/06/21 0700 - 05/07/21 0659 05/07/21 0700 - 05/08/21 0659 05/08/21 0700 - 05/09/21 0659      [id number] 7599-2749 Total [id number] 1900-0659 Total [id number] 1900-0659 Total       Intake    I.V.  170.5  -- 170.5  203.1  -- 203.1  --  -- --    Magnesium Sulfate Volume 51.3 -- 51.3 136.7 -- 136.7 -- -- --    Precedex Volume 119.3 -- 119.3 66.4 -- 66.4 -- -- --    Other  60  -- 60  350  -- 350  --  -- --    Medications (PO/Enteral Liquids) 60 -- 60 350 -- 350 -- -- --    NG/GT  540  -- 540  180  -- 180  90  -- 90    Intake (mL) ([REMOVED] Enteral Tube 05/04/21 Cortrak - Gastric Right nare) 540 -- 540 -- -- -- -- -- --    Intake (mL) (Enteral Tube 05/07/21 10 Fr. Right nare) -- -- -- 180 -- 180 90 -- 90    IV Piggyback  --  -- --  200  -- 200  --  -- --    Volume (mL) (cefepime (MAXIPIME) 2 g in  mL IVPB) -- -- -- 200 -- 200 -- -- --    Enteral  --  300 300  --  300 300  --  -- --    Free Water / Tube Flush -- 300 300 -- 300 300 -- -- --    Total Intake 770.5 300 1070.5 933.1 300 1233.1 90 -- 90       Output    Urine  2300  750 3050  2800  550 3350  --  -- --    Number of Times Voided -- -- -- -- 2 x 2 x -- -- --    Urine Void (mL) -- -- -- 3302 320 2704 -- -- --    Output (mL) ([REMOVED] Urethral Catheter Temperature probe) 2300 750 3050 1750 -- 1750 -- -- --    Drains  --  0 0  --  -- --  --  -- --    Residual Amount (mL) (Discarded) ([REMOVED] Enteral Tube 05/04/21 Cortrak - Gastric Right nare) -- 0 0 -- -- -- -- -- --    Stool  --  --  --  --  -- --  --  -- --    Number of Times Stooled -- -- -- -- 1 x 1 x -- -- --    Chest Tube  --  30 30  30  0 30  --  -- --    Output (mL) (Chest Tube 2 Right Midaxillary) -- 30 30 10 0 10 -- -- --    Output (mL) (Chest Tube 1 Right Midaxillary;Pleural) -- 0 0 20 0 20 -- -- --    Total Output 2300 780 3080 2830 550 3380 -- -- --       Net I/O     -1529.5 -480 -2009.5 -1896.9 -250 -2146.9 90 -- 90        Weight: 78.2 kg (172 lb 6.4 oz)  GI/Nutrition:  Orders Placed This Encounter   Procedures   • Diet NPO     Standing Status:   Standing     Number of Occurrences:   1     Order Specific Question:   Restrict to:     Answer:   Strict [1]     Medications:  Current Facility-Administered Medications   Medication Last Admin   • gabapentin (NEURONTIN) capsule 300 mg     • cloNIDine (CATAPRES) tablet 0.1 mg     • HYDROmorphone (Dilaudid) injection 1-2 mg 2 mg at 05/08/21 0823   • methadone (DOLOPHINE) 10 MG/ML solution 5 mg 5 mg at 05/08/21 0555   • oxyCODONE immediate release (ROXICODONE) tablet 20 mg 20 mg at 05/08/21 1031   • traMADol (ULTRAM) 50 MG tablet 50 mg 50 mg at 05/06/21 2227   • dexmedetomidine (PRECEDEX) 400 mcg/100mL NS premix infusion Stopped at 05/08/21 0830   • cefepime (MAXIPIME) 2 g in  mL IVPB 2 g at 05/08/21 1031   • midazolam (Versed) 2 MG/2ML injection 2 mg 2 mg at 05/05/21 0420   • labetalol (NORMODYNE/TRANDATE) injection 10 mg 10 mg at 05/04/21 0226   • enoxaparin (LOVENOX) inj 40 mg 40 mg at 05/08/21 0556   • Pharmacy Consult: Enteral tube insertion - review meds/change route/product selection     • acetaminophen (Tylenol) tablet 650 mg 650 mg at 05/06/21 0153   • magnesium hydroxide (MILK OF MAGNESIA) suspension 30 mL      And   • senna-docusate (PERICOLACE or SENOKOT S) 8.6-50 MG per tablet 2 tablet Stopped at 05/08/21 0600    And   • polyethylene glycol/lytes (MIRALAX) PACKET 1 Packet      And   • bisacodyl (DULCOLAX) suppository 10 mg     • divalproex (DEPAKOTE SPRINKLE) capsule 500 mg  500 mg at 05/08/21 0559   • DULoxetine (CYMBALTA) capsule 60 mg 60 mg at 05/08/21 0557   • PARoxetine (PAXIL) tablet 10 mg 10 mg at 05/08/21 0557   • risperiDONE (RISPERDAL) tablet 2 mg 2 mg at 05/08/21 0558   • Respiratory Therapy Consult     • famotidine (PEPCID) tablet 20 mg 20 mg at 05/08/21 0559   • MD Alert...ICU Electrolyte Replacement per Pharmacy     • lidocaine (XYLOCAINE) 1 % injection 1-2 mL     • ipratropium-albuterol (DUONEB) nebulizer solution       Medical Decision Making, by Problem:  Active Hospital Problems    Diagnosis    • *Bacteremia due to methicillin susceptible Staphylococcus aureus (MSSA) [R78.81, B95.61]    • Agitation requiring sedation protocol [R45.1]    • 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]    • Acute respiratory failure with hypoxia (HCC) [J96.01]    • Acute bacterial endocarditis [I33.0]    • Empyema of right pleural space (HCC) [J86.9]    • Acute encephalopathy [G93.40]    • Fever [R50.9]    • Pleural effusion, left [J90]    • Atelectasis [J98.11]    • CSF pleocytosis [D72.9]    • Pneumonia [J18.9]    • Tachycardia [R00.0]    • Pseudotumor cerebri [G93.2]    • S/P  shunt [Z98.2]    • Prolonged Q-T interval on ECG [R94.31]    • History of vasculitis [Z86.79]    • History of complicated migraines [G43.109]    • Hyponatremia [E87.1]    • H/O: CVA (cerebrovascular accident) [Z86.73]    • Chronic pain syndrome [G89.4]    • Thrombocytopenia (HCC) [D69.6]    • Anxiety [F41.9]    • Spinal cord stimulator status [Z96.89]    • Goals of care, counseling/discussion [Z71.89]    • Abnormal movement [G25.9]    • Pulmonary abscess (HCC) [J85.2]    • Anasarca [R60.1]    • Thrombocytosis (HCC) [D47.3]    • GERD (gastroesophageal reflux disease) [K21.9]    • Septic shock (HCC) [A41.9, R65.21]    • Anemia [D64.9]    • Hypokalemia [E87.6]      Impression   -Severe sepsis  -Catheter related bloodstream infection due to infected port status post removal 4/12-staph aureus  -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       - Acute left empyema s/p chest tube placement 4/23  -Multiple acute septic pulmonary emboli bilaterally  -Acute bilateral pneumonia due to above     --Pulmonary edema  -Pseudotumor cerebri with underlying  shunt: possible arachnoiditis  -Chronic migraine headaches  -History of autoimmune vasculitis  -Elevated alkaline phosphatase & bilirubin  -Acute encephalopathy due to sepsis      - anemia due to above      - New secondary Klebsiella pneumoniae pneumonia (presumed VAP)     Plan   - Persistent fever likely attributable to empyema and multiple lung abscesses. Currently covered. Source control.  - Continue cefepime - clinical endpoint  - Vent management per pulmonology - SBT trials - still not passing  - Tolerated the decortication. Cx NGTD. Another surgery likely will become necessary. Per thoracic surgery. CT Monday  - No IV abx changes or further ID workup in the moment  - Continue to monitor closely  - Improving overall. Source control issues. Abx fine. Not a great candidate for more surgery. Per Surgery discretion though  - Current abx covering CNS and back hardware. This HW is not coming out anytime soon per NS.  - New line in place.     Case and treatment reviewed with patient(best possible) and nursing    > 35 min on floor in ICU with > 50% face to face view and 100% in direct patient care/counseling today.    Dr Lowery

## 2021-05-08 NOTE — CARE PLAN
Problem: Communication  Goal: The ability to communicate needs accurately and effectively will improve  Outcome: PROGRESSING AS EXPECTED     Problem: Safety  Goal: Will remain free from falls  Outcome: PROGRESSING AS EXPECTED     Problem: Infection  Goal: Will remain free from infection  Outcome: PROGRESSING AS EXPECTED     Problem: Venous Thromboembolism (VTW)/Deep Vein Thrombosis (DVT) Prevention:  Goal: Patient will participate in Venous Thrombosis (VTE)/Deep Vein Thrombosis (DVT)Prevention Measures  Outcome: PROGRESSING AS EXPECTED     Problem: Bowel/Gastric:  Goal: Normal bowel function is maintained or improved  Outcome: PROGRESSING AS EXPECTED  Goal: Will not experience complications related to bowel motility  Outcome: PROGRESSING AS EXPECTED     Problem: Knowledge Deficit  Goal: Knowledge of disease process/condition, treatment plan, diagnostic tests, and medications will improve  Outcome: PROGRESSING AS EXPECTED  Goal: Knowledge of the prescribed therapeutic regimen will improve  Outcome: PROGRESSING AS EXPECTED     Problem: Discharge Barriers/Planning  Goal: Patient's continuum of care needs will be met  Outcome: PROGRESSING AS EXPECTED     Problem: Fluid Volume:  Goal: Will maintain balanced intake and output  Outcome: PROGRESSING AS EXPECTED     Problem: Respiratory:  Goal: Respiratory status will improve  Outcome: PROGRESSING AS EXPECTED     Problem: Skin Integrity  Goal: Risk for impaired skin integrity will decrease  Outcome: PROGRESSING AS EXPECTED     Problem: Pain Management  Goal: Pain level will decrease to patient's comfort goal  Outcome: PROGRESSING AS EXPECTED     Problem: Psychosocial Needs:  Goal: Level of anxiety will decrease  Outcome: PROGRESSING AS EXPECTED     Problem: Mechanical Ventilation  Goal: Ability to express needs and understand communication  Outcome: PROGRESSING AS EXPECTED  Goal: Successful weaning off mechanical ventilator. Spontaneously maintains adequate gas  exchange  Outcome: PROGRESSING AS EXPECTED     Problem: Hemodynamic Status  Goal: Vital Signs and Fluid Balance Management  Outcome: PROGRESSING AS EXPECTED     Problem: Nutrition Deficit  Goal: Enteral Nutrition Management  Outcome: PROGRESSING AS EXPECTED     Problem: Urinary Elimination:  Goal: Ability to reestablish a normal urinary elimination pattern will improve  Outcome: PROGRESSING AS EXPECTED     Problem: Mobility  Goal: Risk for activity intolerance will decrease  Outcome: PROGRESSING AS EXPECTED

## 2021-05-09 ENCOUNTER — APPOINTMENT (OUTPATIENT)
Dept: RADIOLOGY | Facility: MEDICAL CENTER | Age: 51
DRG: 003 | End: 2021-05-09
Attending: INTERNAL MEDICINE
Payer: MEDICARE

## 2021-05-09 LAB
ANION GAP SERPL CALC-SCNC: 5 MMOL/L (ref 7–16)
BACTERIA BLD CULT: NORMAL
BACTERIA BLD CULT: NORMAL
BASOPHILS # BLD AUTO: 0.4 % (ref 0–1.8)
BASOPHILS # BLD: 0.05 K/UL (ref 0–0.12)
BUN SERPL-MCNC: 14 MG/DL (ref 8–22)
CALCIUM SERPL-MCNC: 9 MG/DL (ref 8.5–10.5)
CHLORIDE SERPL-SCNC: 99 MMOL/L (ref 96–112)
CO2 SERPL-SCNC: 32 MMOL/L (ref 20–33)
CREAT SERPL-MCNC: <0.17 MG/DL (ref 0.5–1.4)
EOSINOPHIL # BLD AUTO: 0.01 K/UL (ref 0–0.51)
EOSINOPHIL NFR BLD: 0.1 % (ref 0–6.9)
ERYTHROCYTE [DISTWIDTH] IN BLOOD BY AUTOMATED COUNT: 62.8 FL (ref 35.9–50)
GLUCOSE SERPL-MCNC: 95 MG/DL (ref 65–99)
HCT VFR BLD AUTO: 30.1 % (ref 37–47)
HGB BLD-MCNC: 9.2 G/DL (ref 12–16)
IMM GRANULOCYTES # BLD AUTO: 0.18 K/UL (ref 0–0.11)
IMM GRANULOCYTES NFR BLD AUTO: 1.5 % (ref 0–0.9)
LYMPHOCYTES # BLD AUTO: 1.43 K/UL (ref 1–4.8)
LYMPHOCYTES NFR BLD: 11.9 % (ref 22–41)
MAGNESIUM SERPL-MCNC: 1.7 MG/DL (ref 1.5–2.5)
MCH RBC QN AUTO: 29.2 PG (ref 27–33)
MCHC RBC AUTO-ENTMCNC: 30.6 G/DL (ref 33.6–35)
MCV RBC AUTO: 95.6 FL (ref 81.4–97.8)
MONOCYTES # BLD AUTO: 0.83 K/UL (ref 0–0.85)
MONOCYTES NFR BLD AUTO: 6.9 % (ref 0–13.4)
NEUTROPHILS # BLD AUTO: 9.56 K/UL (ref 2–7.15)
NEUTROPHILS NFR BLD: 79.2 % (ref 44–72)
NRBC # BLD AUTO: 0 K/UL
NRBC BLD-RTO: 0 /100 WBC
PLATELET # BLD AUTO: 382 K/UL (ref 164–446)
PMV BLD AUTO: 9.3 FL (ref 9–12.9)
POTASSIUM SERPL-SCNC: 3.8 MMOL/L (ref 3.6–5.5)
RBC # BLD AUTO: 3.15 M/UL (ref 4.2–5.4)
SIGNIFICANT IND 70042: NORMAL
SIGNIFICANT IND 70042: NORMAL
SITE SITE: NORMAL
SITE SITE: NORMAL
SODIUM SERPL-SCNC: 136 MMOL/L (ref 135–145)
SOURCE SOURCE: NORMAL
SOURCE SOURCE: NORMAL
WBC # BLD AUTO: 12.1 K/UL (ref 4.8–10.8)

## 2021-05-09 PROCEDURE — 94003 VENT MGMT INPAT SUBQ DAY: CPT

## 2021-05-09 PROCEDURE — 80048 BASIC METABOLIC PNL TOTAL CA: CPT

## 2021-05-09 PROCEDURE — A9270 NON-COVERED ITEM OR SERVICE: HCPCS | Performed by: INTERNAL MEDICINE

## 2021-05-09 PROCEDURE — 99291 CRITICAL CARE FIRST HOUR: CPT | Performed by: INTERNAL MEDICINE

## 2021-05-09 PROCEDURE — 700105 HCHG RX REV CODE 258: Performed by: INTERNAL MEDICINE

## 2021-05-09 PROCEDURE — 71045 X-RAY EXAM CHEST 1 VIEW: CPT

## 2021-05-09 PROCEDURE — 83735 ASSAY OF MAGNESIUM: CPT

## 2021-05-09 PROCEDURE — 700102 HCHG RX REV CODE 250 W/ 637 OVERRIDE(OP): Performed by: INTERNAL MEDICINE

## 2021-05-09 PROCEDURE — 94799 UNLISTED PULMONARY SVC/PX: CPT

## 2021-05-09 PROCEDURE — 85025 COMPLETE CBC W/AUTO DIFF WBC: CPT

## 2021-05-09 PROCEDURE — 770022 HCHG ROOM/CARE - ICU (200)

## 2021-05-09 PROCEDURE — 700111 HCHG RX REV CODE 636 W/ 250 OVERRIDE (IP): Performed by: INTERNAL MEDICINE

## 2021-05-09 PROCEDURE — 94640 AIRWAY INHALATION TREATMENT: CPT

## 2021-05-09 RX ORDER — POTASSIUM CHLORIDE 20 MEQ/1
40 TABLET, EXTENDED RELEASE ORAL ONCE
Status: COMPLETED | OUTPATIENT
Start: 2021-05-09 | End: 2021-05-09

## 2021-05-09 RX ORDER — MAGNESIUM SULFATE HEPTAHYDRATE 40 MG/ML
2 INJECTION, SOLUTION INTRAVENOUS ONCE
Status: COMPLETED | OUTPATIENT
Start: 2021-05-09 | End: 2021-05-09

## 2021-05-09 RX ADMIN — CEFEPIME 2 G: 2 INJECTION, POWDER, FOR SOLUTION INTRAVENOUS at 18:12

## 2021-05-09 RX ADMIN — DOCUSATE SODIUM 50 MG AND SENNOSIDES 8.6 MG 2 TABLET: 8.6; 5 TABLET, FILM COATED ORAL at 05:08

## 2021-05-09 RX ADMIN — CLONIDINE HYDROCHLORIDE 0.1 MG: 0.1 TABLET ORAL at 13:16

## 2021-05-09 RX ADMIN — METHADONE HYDROCHLORIDE 5 MG: 10 CONCENTRATE ORAL at 13:15

## 2021-05-09 RX ADMIN — RISPERIDONE 2 MG: 1 TABLET, FILM COATED ORAL at 18:12

## 2021-05-09 RX ADMIN — DIVALPROEX SODIUM 500 MG: 125 CAPSULE ORAL at 05:08

## 2021-05-09 RX ADMIN — DIVALPROEX SODIUM 500 MG: 125 CAPSULE ORAL at 20:57

## 2021-05-09 RX ADMIN — DIVALPROEX SODIUM 500 MG: 125 CAPSULE ORAL at 13:15

## 2021-05-09 RX ADMIN — OXYCODONE HYDROCHLORIDE 20 MG: 10 TABLET ORAL at 18:11

## 2021-05-09 RX ADMIN — ENOXAPARIN SODIUM 40 MG: 40 INJECTION SUBCUTANEOUS at 05:07

## 2021-05-09 RX ADMIN — DOCUSATE SODIUM 50 MG AND SENNOSIDES 8.6 MG 2 TABLET: 8.6; 5 TABLET, FILM COATED ORAL at 18:11

## 2021-05-09 RX ADMIN — METHADONE HYDROCHLORIDE 5 MG: 10 CONCENTRATE ORAL at 21:00

## 2021-05-09 RX ADMIN — RISPERIDONE 2 MG: 1 TABLET, FILM COATED ORAL at 05:08

## 2021-05-09 RX ADMIN — GABAPENTIN 300 MG: 300 CAPSULE ORAL at 05:09

## 2021-05-09 RX ADMIN — DULOXETINE HYDROCHLORIDE 60 MG: 60 CAPSULE, DELAYED RELEASE ORAL at 05:08

## 2021-05-09 RX ADMIN — FAMOTIDINE 20 MG: 20 TABLET ORAL at 05:09

## 2021-05-09 RX ADMIN — POTASSIUM CHLORIDE 40 MEQ: 1500 TABLET, EXTENDED RELEASE ORAL at 09:28

## 2021-05-09 RX ADMIN — GABAPENTIN 300 MG: 300 CAPSULE ORAL at 13:16

## 2021-05-09 RX ADMIN — HYDROMORPHONE HYDROCHLORIDE 1 MG: 1 INJECTION, SOLUTION INTRAMUSCULAR; INTRAVENOUS; SUBCUTANEOUS at 06:11

## 2021-05-09 RX ADMIN — GABAPENTIN 300 MG: 300 CAPSULE ORAL at 20:57

## 2021-05-09 RX ADMIN — CEFEPIME 2 G: 2 INJECTION, POWDER, FOR SOLUTION INTRAVENOUS at 09:28

## 2021-05-09 RX ADMIN — PAROXETINE HYDROCHLORIDE 10 MG: 20 TABLET, FILM COATED ORAL at 05:08

## 2021-05-09 RX ADMIN — OXYCODONE HYDROCHLORIDE 20 MG: 10 TABLET ORAL at 01:56

## 2021-05-09 RX ADMIN — CLONIDINE HYDROCHLORIDE 0.1 MG: 0.1 TABLET ORAL at 05:09

## 2021-05-09 RX ADMIN — METHADONE HYDROCHLORIDE 5 MG: 10 CONCENTRATE ORAL at 05:08

## 2021-05-09 RX ADMIN — MAGNESIUM SULFATE 2 G: 2 INJECTION INTRAVENOUS at 09:28

## 2021-05-09 RX ADMIN — DULOXETINE HYDROCHLORIDE 60 MG: 60 CAPSULE, DELAYED RELEASE ORAL at 18:11

## 2021-05-09 RX ADMIN — OXYCODONE HYDROCHLORIDE 20 MG: 10 TABLET ORAL at 05:09

## 2021-05-09 RX ADMIN — HYDROMORPHONE HYDROCHLORIDE 2 MG: 1 INJECTION, SOLUTION INTRAMUSCULAR; INTRAVENOUS; SUBCUTANEOUS at 23:23

## 2021-05-09 RX ADMIN — FAMOTIDINE 20 MG: 20 TABLET ORAL at 18:12

## 2021-05-09 RX ADMIN — HYDROMORPHONE HYDROCHLORIDE 1 MG: 1 INJECTION, SOLUTION INTRAMUSCULAR; INTRAVENOUS; SUBCUTANEOUS at 00:34

## 2021-05-09 RX ADMIN — CEFEPIME 2 G: 2 INJECTION, POWDER, FOR SOLUTION INTRAVENOUS at 01:56

## 2021-05-09 RX ADMIN — HYDROMORPHONE HYDROCHLORIDE 2 MG: 1 INJECTION, SOLUTION INTRAMUSCULAR; INTRAVENOUS; SUBCUTANEOUS at 14:32

## 2021-05-09 RX ADMIN — HYDROMORPHONE HYDROCHLORIDE 2 MG: 1 INJECTION, SOLUTION INTRAMUSCULAR; INTRAVENOUS; SUBCUTANEOUS at 18:39

## 2021-05-09 RX ADMIN — OXYCODONE HYDROCHLORIDE 20 MG: 10 TABLET ORAL at 09:28

## 2021-05-09 RX ADMIN — CLONIDINE HYDROCHLORIDE 0.1 MG: 0.1 TABLET ORAL at 20:57

## 2021-05-09 RX ADMIN — HYDROMORPHONE HYDROCHLORIDE 2 MG: 1 INJECTION, SOLUTION INTRAMUSCULAR; INTRAVENOUS; SUBCUTANEOUS at 10:23

## 2021-05-09 RX ADMIN — OXYCODONE HYDROCHLORIDE 20 MG: 10 TABLET ORAL at 20:57

## 2021-05-09 RX ADMIN — OXYCODONE HYDROCHLORIDE 20 MG: 10 TABLET ORAL at 13:16

## 2021-05-09 ASSESSMENT — PAIN DESCRIPTION - PAIN TYPE
TYPE: CHRONIC PAIN
TYPE: ACUTE PAIN;CHRONIC PAIN
TYPE: ACUTE PAIN
TYPE: ACUTE PAIN;CHRONIC PAIN
TYPE: CHRONIC PAIN
TYPE: ACUTE PAIN
TYPE: CHRONIC PAIN
TYPE: CHRONIC PAIN

## 2021-05-09 ASSESSMENT — FIBROSIS 4 INDEX: FIB4 SCORE: 0.75

## 2021-05-09 ASSESSMENT — ENCOUNTER SYMPTOMS
FEVER: 0
ABDOMINAL PAIN: 0

## 2021-05-09 NOTE — PROGRESS NOTES
"Critical Care Progress Note    Date of admission  4/16/2021    Chief Complaint  50 y.o. female the past medical history of pseudotumor cerebri status post  shunt by Dr. Oh, chronic pain with history of placement of nerve stimulator, vasculitis, right anterior chest port for home IV meds with recurrent infection who presented 4/11/2021 with to [facility] with recurrent port infection and was initially treated with vancomycin and Zosyn and then changed to ceftaroline and subsequently switched to nafcillin when MSSA was identified.    Hospital Course  \"50 y.o. female the past medical history of pseudotumor cerebri status post  shunt by Dr. Oh, chronic pain with history of placement of nerve stimulator, vasculitis, right anterior chest port for home IV meds with recurrent infection who presented 4/11/2021 with to [facility] with recurrent port infection. Found to have MSSA bacteremia, endocarditis, septic pulmonary emboli/empyema/pneumatocele, and CSF pleocytosis concerning for  shunt involvement.  Seen by Dr. Oh, NSGY, at [facility] who did not recommend  shunt removal.  Seen by Dr. Vargas here for concern of fluid collection around her spinal stimulator and he recommended against removal. Remains intubated, encephalopathic.    4/16 admitted to ICU  4/17 VD 2, 2 FFP, pRBC overnight; new chest tube per gen surg  4/18 VD 3, TPa/Dornase with 1400 cc from R chest tube    4/26: R chest tube not functioning, d/c it.  broke his leg and going to OR today, so not available currently.  VD 11.  8/30%. RSBI immediately high on SBT attempt. Followed commands for RN  4/27: VD12. 8/30%. Not tolerating wean, high RSBI and tachycardic.   4/28: VD 13. VATS with Dr. Ganser.    4/29: VD 14. 8/30%. Still not tolerating wean d/t severe tachycardia just with SAT. Trialed precedex and she required fentanyl up to 600/hr to tolerate, so back on propofol. Chest tubes -40  4/30: VD 15. 8/30%. Try ketamine/versed as sedative. Severe " tachycardia and HTN with weaning down propofol. Plan for perc trach tomorrow. Chest tubes -40  5/1: perc trach. PICC placed. Chest tubes remain -40.  5/2: Able to spont well on sedation, but very tachy/HTN when sedation is turned down.  5/3 Versed weaned down to 3mg/min.   5/4 off versed and ketamine gtt  5/5 trials of SBT w/ trach  5/6 T piece trial during day, mobilize to chair x2, rest vent 8/8 at night, started methadone, gabapentin   5/7 T piece during day with, rest PS 8/8 overnight, spacing out dilaudid prn's  5/8 T piece during day, still with anxiety weaning dex gtt    Interval Problem Update  Neuro: off dex since 6pm, aox4 writes on board, CAM ICU neg  HR: tachy  SBP: 's  Tmax: afebrile  GI: BM 5/7  UOP: good purwick  Lines: picc line  Resp: T piece 5.5 hrs per patient request PS 5/8 min secretions  Vte: lovenox  PPI/H2:pepcid  Antibx: cefepmine  Urine output volume status?  Speech eval for speaking valve  Mobility walk today  D/c versed prn    Review of Systems  Review of Systems   Unable to perform ROS: Intubated        Vital Signs for last 24 hours   Temp:  [35.6 °C (96.1 °F)-36.4 °C (97.5 °F)] 36.4 °C (97.5 °F)  Pulse:  [] 111  Resp:  [11-25] 17  BP: ()/(40-85) 100/50  SpO2:  [94 %-99 %] 98 %    Hemodynamic parameters for last 24 hours       Respiratory Information for the last 24 hours  Vent Mode: Spont  PEEP/CPAP: 8  P Support: 5  MAP: 9.9  Length of Weaning Trial (Hours): Ongoing  Control VTE (exp VT): 432    Physical Exam   Physical Exam  Vitals and nursing note reviewed.   Constitutional:       Appearance: She is ill-appearing.      Interventions: She is sedated and intubated.      Comments: Chronically ill appearing sitting in chair, trach in place   HENT:      Head:      Comments: Wasting temples     Mouth/Throat:      Mouth: Mucous membranes are dry.   Eyes:      Pupils: Pupils are equal, round, and reactive to light.   Neck:      Comments: Trach in place  8.0  Portex  Cardiovascular:      Rate and Rhythm: Regular rhythm. Tachycardia present.      Heart sounds: Murmur present.   Pulmonary:      Effort: No respiratory distress. She is intubated.      Breath sounds: No stridor. No wheezing or rhonchi.      Comments: Some air movement to right chest with course sounds, clear left without wheezing or rales.  Abdominal:      General: Bowel sounds are normal. There is no distension.      Tenderness: There is no abdominal tenderness. There is no guarding.   Musculoskeletal:      Cervical back: No rigidity or tenderness.      Right lower leg: Edema present.      Left lower leg: Edema present.      Comments: Edema to hands and feet   Skin:     General: Skin is warm and dry.      Capillary Refill: Capillary refill takes 2 to 3 seconds.      Coloration: Skin is pale.   Neurological:      Comments: Awake able to mouth words, shakes head yes and no, ext weak bilateral moves all extremities, no numbness to extremities. Writes on a white board.    Psychiatric:      Comments: anxious         Medications  Current Facility-Administered Medications   Medication Dose Route Frequency Provider Last Rate Last Admin   • magnesium sulfate IVPB premix 2 g  2 g Intravenous Once Juventino Lozano M.D.   Stopped at 05/09/21 1128   • gabapentin (NEURONTIN) capsule 300 mg  300 mg Enteral Tube Q8HRS Juventino Lozano M.D.   300 mg at 05/09/21 0509   • cloNIDine (CATAPRES) tablet 0.1 mg  0.1 mg Enteral Tube Q8HRS Juventino Lozano M.D.   0.1 mg at 05/09/21 0509   • HYDROmorphone (Dilaudid) injection 1-2 mg  1-2 mg Intravenous Q4HRS PRN Juventino Lozano M.D.   2 mg at 05/09/21 1023   • methadone (DOLOPHINE) 10 MG/ML solution 5 mg  5 mg Enteral Tube Q8HRS Juventino Lozano M.D.   5 mg at 05/09/21 0508   • oxyCODONE immediate release (ROXICODONE) tablet 20 mg  20 mg Enteral Tube Q4HRS Jonathan Upton D.OFrederick   20 mg at 05/09/21 0928   • traMADol (ULTRAM) 50 MG tablet 50 mg  50 mg Enteral Tube Q6HRS PRN Jonathan Upton  D.O.   50 mg at 05/06/21 2227   • dexmedetomidine (PRECEDEX) 400 mcg/100mL NS premix infusion  0.1-1.5 mcg/kg/hr Intravenous Continuous Jonathan Upton D.O.   Stopped at 05/08/21 1800   • cefepime (MAXIPIME) 2 g in  mL IVPB  2 g Intravenous Q8HRS Eric Lowery M.D.   Stopped at 05/09/21 0958   • labetalol (NORMODYNE/TRANDATE) injection 10 mg  10 mg Intravenous Q4HRS PRN Leti Sun M.D.   10 mg at 05/04/21 0226   • enoxaparin (LOVENOX) inj 40 mg  40 mg Subcutaneous DAILY Benoit Loya Jr., D.O.   40 mg at 05/09/21 0507   • Pharmacy Consult: Enteral tube insertion - review meds/change route/product selection  1 Each Other PHARMACY TO DOSE Elliott Salvador M.D.       • acetaminophen (Tylenol) tablet 650 mg  650 mg Enteral Tube Q4HRS PRN Elliott Salvador M.D.   650 mg at 05/06/21 0153   • magnesium hydroxide (MILK OF MAGNESIA) suspension 30 mL  30 mL Enteral Tube QDAY PRN Elliott Salvador M.D.        And   • senna-docusate (PERICOLACE or SENOKOT S) 8.6-50 MG per tablet 2 tablet  2 tablet Enteral Tube BID Elliott Salvador M.D.   2 tablet at 05/09/21 0508    And   • polyethylene glycol/lytes (MIRALAX) PACKET 1 Packet  1 Packet Enteral Tube QDAY PRN Elliott Salvador M.D.        And   • bisacodyl (DULCOLAX) suppository 10 mg  10 mg Rectal QDAY PRN Elliott Salvador M.D.       • divalproex (DEPAKOTE SPRINKLE) capsule 500 mg  500 mg Enteral Tube Q8HRS Elliott Salvador M.D.   500 mg at 05/09/21 0508   • DULoxetine (CYMBALTA) capsule 60 mg  60 mg Enteral Tube BID Elliott Salvador M.D.   60 mg at 05/09/21 0508   • PARoxetine (PAXIL) tablet 10 mg  10 mg Enteral Tube QAM Elliott Salvador M.D.   10 mg at 05/09/21 0508   • risperiDONE (RISPERDAL) tablet 2 mg  2 mg Enteral Tube BID Elliott Salvador M.D.   2 mg at 05/09/21 0508   • Respiratory Therapy Consult   Nebulization Continuous RT Man Wahl M.D.       • famotidine (PEPCID) tablet 20 mg  20 mg Enteral Tube Q12HRS Man Wahl M.D.   20 mg at  05/09/21 0509   • MD Alert...ICU Electrolyte Replacement per Pharmacy   Other PHARMACY TO DOSE Man Wahl M.D.       • lidocaine (XYLOCAINE) 1 % injection 1-2 mL  1-2 mL Tracheal Tube Q30 MIN PRN Man Wahl M.D.       • ipratropium-albuterol (DUONEB) nebulizer solution  3 mL Nebulization Q2HRS PRN (RT) Man Wahl M.D.           Fluids    Intake/Output Summary (Last 24 hours) at 5/9/2021 1039  Last data filed at 5/9/2021 0800  Gross per 24 hour   Intake 2987.42 ml   Output 640 ml   Net 2347.42 ml       Laboratory          Recent Labs     05/07/21  0330 05/08/21  0515 05/09/21  0518   SODIUM 134* 137 136   POTASSIUM 4.1 4.2 3.8   CHLORIDE 99 99 99   CO2 32 32 32   BUN 17 16 14   CREATININE 0.21* <0.17* <0.17*   MAGNESIUM 1.7 1.9 1.7   PHOSPHORUS 2.8 3.3  --    CALCIUM 8.6 8.5 9.0     Recent Labs     05/07/21  0330 05/08/21  0515 05/09/21  0518   GLUCOSE 100* 106* 95     Recent Labs     05/07/21 0330 05/08/21  0515 05/09/21  0518   WBC 13.3* 12.9* 12.1*   NEUTSPOLYS 91.40* 80.60* 79.20*   LYMPHOCYTES 6.00* 11.20* 11.90*   MONOCYTES 2.60 6.50 6.90   EOSINOPHILS 0.00 0.00 0.10   BASOPHILS 0.00 0.50 0.40     Recent Labs     05/07/21 0330 05/08/21  0515 05/09/21  0518   RBC 3.10* 3.06* 3.15*   HEMOGLOBIN 9.4* 9.1* 9.2*   HEMATOCRIT 29.3* 29.1* 30.1*   PLATELETCT 357 341 382       Imaging  X-Ray:  I have personally reviewed the images and compared with prior images.  4/23 MICAH: large vegetations in MV and TV    Microbiology:  4/16 BCx 2/2 MSSA  4/17 BCx 2/2: negative  4/20 Trach aspirate: K. pneumonia  4/24 BAL: K. Pneumonia >100k cfu/ml.   4/25 BCx 2/2: negative  4/28 Pleural tissue (intraop OR specimen): K. Pneumonia. Pansensitive  5/4 BCx 2/2: pending, negative to date    Assessment/Plan  * Bacteremia due to methicillin susceptible Staphylococcus aureus (MSSA)- (present on admission)  Assessment & Plan  Source presumed right anterior chest line/port with endocarditis  Multiple sources for MSSA  including MV/TV vegetation, port, spinal stimulator/ shunt  Port has been removed  Last positive blood cultures were 4/17  Pt appeared to be on nafcillin from 4/16-4/23. Pt did receive MSSA therapy when she was in [facility].   ID following and treating MSSA bacteremia with Cefepime for CNS and lung pentretration.     Agitation requiring sedation protocol  Assessment & Plan  Patient was on propofol/fentanyl for approximately 2 weeks then versed and ketamine gtt.   Off versed and ketamine gtt 5/4  Increasing oxycodone to 20 Q4H. Will start Methadone 10mg Q8 for acute pain, gabapentin to limit IV narcotics and help wean dex gtt.  Consider starting clonidine for pain -> start 5/8 blood pressure and anxiety to get off dex gtt     Would consider consolidating to methadone with prn oxy again weaning IV narcotics        Trapped lung  Assessment & Plan  Decortication 4/28  Continue chest tubes to suction and hope lungs expand  R chest has had expanding somewhat.    There is now visible respiratory movement in the R chest and some improved expansion on CXR    Empyema of right pleural space (HCC)- (present on admission)  Assessment & Plan  R empyema due to septic pulmonary emboli  4/16 Chest tube placement at [facility]  4/18 Upsized by Dr Benedict  4/18-4/20 Received TPA/dornase.  Stopped b/c Hgb dropped requiring transfusion  4/26 was discontinued due to stopped functioning   4/28 s/p right VATS and decortication. Cx grew of Klebsiella pneumonia  May need another intervention in future, per Dr. Ganser  Has two chest tubes on the right and connect to -40 per surgery    Repeat CT next week        Acute bacterial endocarditis- (present on admission)  Assessment & Plan  MSSA bacteremia at OSH and here on 4/16. First negative blood cultures were on 4/17  Infected port removed at [facility]   shunt and spinal stimulator could be seeded, NSGY has consulted on both and don't recommend removal at admission due to HD instability.   MV and TV  vegetations with septic pulmonary emboli  Nataly ID Dr Omer/Beverley following.   K. Pneumonia empyema - on cefepime  Deemed not surgical candidate - No evidence based surgical indications (valvular CHF, difficult pathogen, etc) so no cardiovascular surgery consult is necessary at this time per prior note    Plan:   On cefepime for K. pneumonia  Received nafcilin from 4/16-4/22.   ID following    Follow up CT next week as surveillance     Acute respiratory failure with hypoxia (HCC)- (present on admission)  Assessment & Plan  4/15 Intubated for acute hypoxic respiratory failure at [facility] due to hydro/pneumo, trapped lung, lung abscesses  4/16 transferred to [facility]   4/16-4/28 Difficult to wean due to ongoing right loculated hydropneumothorax and lung abscesses, empyema. S/p 2 chest tubes  4/28 S/p right lung decortication by Dr. Ganser  5/1 s/p percutaneous tracheostomy    Concern of difficult to wean off sedation given prolonged sedation with prolonged intubation.   Pt was on versed gtt, scheduled oxycodone and dilaudid IV prn  4/30 ketamine gtt was started and planning to bridge and treat pain while weaning off versed    Plan:   Daily trach collar during day would rest on PS at night 8/8 with 's to prevent further de-recruitment of right trap lung  Continue diuresis with lasix 20 Q12H. Goal even to minimally negative fluid balance.   Tolerating tube feed at goal  GI was consulted, Dr. Moseley for possible PEG placement. Given pt appears to slowly recover, would like to give a chance for patient to recover before deciding for PEG.   Increase mobility       Acute encephalopathy- (present on admission)  Assessment & Plan  Likely multifactorial given CSF pleocytosis concerning of likely seeded spinal stimulator/ shunt, sedation  Pt has hx of  shunt for pseudotumor cerebri  Concern of autoimmune cerebritis was entertained but this was not confirmed.   Not candidate for MRI d/t spinal stimulator  EEG  with encephalopathy and no seizures  Minimize sedation as able  Daily SAT    Improved mouthing words writing on board      Pleural effusion, left  Assessment & Plan  Loculated fluid  Probably parapneumonic fluid from abscesses  CT guided chest tube placed 4/23, fluid sent  Lymph predominant, NGTD  TPA/dornase one dose on 4/24.  Hold d/t dropping Hgb  CT chest 4/25 with resolution of fluid  CT clamped 4/29-no significant accumulation of fluid so d/c 4/30    Pneumonia  Assessment & Plan  MSSA septic emboli with empyema   VAE  BAL 4/20 klebsiella pneumonia  BAL 4/24 still heavy growth of klebsiella  4/28 OR pleural fluid specimen growing klebsiella.   On cefepime    CSF pleocytosis- (present on admission)  Assessment & Plan  Status post lumbar puncture 4/13  WBC 53, 82% polys, Glucose 47, protein 97 Gram stain negative and culture negative at [facility]  Neurosurgery aware,  shunt in place  MRI brain requested by neurosurgery at [facility], not candidate d/t stimulator  Repeat ct no embolic lesions      Acute blood loss anemia  Assessment & Plan  Resolved  Hgb stable in 7-8s      S/P  shunt- (present on admission)  Assessment & Plan  History of pseudotumor cerebri  History of  shunt by Dr. Oh  Could be seeded by MSSA, Dr. Oh consulted at [facility], recommended MRI but unable given her spinal stimulator  Need to revisit this when patient is more stable      Goals of care, counseling/discussion  Assessment & Plan  Palliative is consulting  Dr. Sun spoke with Benoit on 4/27. I explained my concern that even if Enedelia were to survive this her QOL would likely be quite a bit worse.  She won't be able to have another port and was receiving IV meds for HA 3-4 times weekly.  She also could have worse headaches after this episode of meningitis.  He asked if she could have brain damage, and I explained that was possible but hard to know right now.  He says part of him thinks Enedelia would want to keep fighting and part of him thinks she  would say to stop aggressive care because her QOL was already very limited by her headaches. Continue aggressive care for now.  Dr Upton 5/4 spoke with  who's at bedside and updated him about patient's critical condition.     Spinal cord stimulator status  Assessment & Plan  Patient's stimulator is [employer]. It is FDA approved as MRI compatible, but the company has gone out of business, so there is no support team to assist with MRI.  Thus, if MRI were performed, our radiologists would need to protocol it correctly to manage the stimulator. The former rep from [employer] is Andre, [phone].  Information obtained from Dr. Mahoney's office, [phone].    Per  (5/4/2021), it is not MRI compatible unfortunately.     Pulmonary abscess (HCC)  Assessment & Plan  Septic emboli from TV endocarditis    Septic shock (HCC)- (present on admission)  Assessment & Plan  Shock resolved    Abnormal LFTs  Assessment & Plan  Chronic alk phos elevation  GGT high c/w liver source  Outpatient workup    Chronic pain syndrome- (present on admission)  Assessment & Plan  Chronic back pain and intractable headaches  History of nerve stimulator   Dr Vargas consulted at admission, no plan to remove stimulator due to her clinical instability  Multimodal with norco, gabapentin, methadone, clonidine    Anxiety- (present on admission)  Assessment & Plan  Depakote, paxil, cymbalta, gabapentin, clonidine, dex gtt     VTE:  lovenox  Ulcer: H2 Antagonist  Lines: None Two chest tubes on the right, PICC line    I have performed a physical exam and reviewed and updated ROS and Plan today (5/9/2021). In review of yesterday's note (5/8/2021), there are no changes except as documented above.       Discussed patient condition and risk of morbidity and/or mortality with RN, RT, Pharmacy, Dietary, Code status disscussed and Charge nurse / hot rounds.      The patient remains critically ill requiring mechanical ventilation, difficulty weaning  from vent.  Critical care time = 35minutes in directly providing and coordinating critical care and extensive data review.  No time overlap and excludes procedures

## 2021-05-09 NOTE — RESPIRATORY CARE
Ventilator Daily Summary    Vent Day #23, Trach day 8    Spont 8/8    Ventilator settings changed this shift. none    Weaning trials:    Respiratory Procedures:    Plan: Continue current ventilator settings and wean mechanical ventilation as tolerated per physician orders.

## 2021-05-09 NOTE — CARE PLAN
Problem: Safety  Goal: Will remain free from falls  Outcome: PROGRESSING AS EXPECTED     Problem: Psychosocial Needs:  Goal: Level of anxiety will decrease  Outcome: PROGRESSING AS EXPECTED     Problem: Mobility  Goal: Risk for activity intolerance will decrease  Outcome: PROGRESSING AS EXPECTED

## 2021-05-09 NOTE — PROGRESS NOTES
Infectious Disease Progress Note    Author: Eric Lowery M.D. Date & Time created: 5/9/2021  1:26 PM  Cefepime 4/23 -  current Day #14     Thoracoscopy & Decortication - 4/28     PRIOR Rx:   Zosyn 4/22-4/23 S/P Thoracoscopy & Decortication Day #6  Previous: Unasyn, Zosyn, Vanco, Daptomycin  Ceftaroline: start 4/13-4/15  Nafcillin 2g Q4 hrs 4/15-4/23 4/14: Unable to give name of previous NSG or neurologist. Seems confused, but able to say some sentences fluently.   4/15: Line cultures now growing MSSA. As well as from the blood. Repeat blood culture 4/13+ in both sets. Patient has worsening confusion. CSF fluid analyses suggest pleocytosis. Cultures are no growth from the CSF. Chest CT was ordered this morning indicating a loculated right pleural effusion as well as bilateral septic emboli. Dr. Mack spoke with Dr. Oh yesterday and no surgical intervention unless clinical deterioration.  4/16: Increased respiratory distress.  Tachypneic.  CT with loculated collection.  Dr Resendiz not available.  No thoracic surgeon available.  Plans to have pulmonary place CT.  Transferring to ICU.  4/17: Transferred to [facility] last night. Hgb dropped to 6.4 requiring PRBC transfusion. Requiring 2 pressors. Fio2 50%. Febrile 38.8. Pending OR decortication of empyema and hemothorax. Chest tube placed at [facility] shows 8,371 WBC, ,000, 74% N  4/18: Chest tube was upsized by surgery yesterday, no decortication. WBC 22k. Fio2 40%. Vasopressin. Levophed down to 2mcg. Afebrile 24 hrs. Last fever 38.6 on 4/16.   4/19: Still on vent. Levo 3 mcg, vasopressin. Afebrile 72 hours. WBC 21k. BC 4/17 NGTD x 48 hours. Unable to do MRI brain given neurostimulator per RN.   4/20: Remaining afebrile. Hb 6.2 and undergoing transfusion today.WBC 24,100, 5% bands.1700 ml CT output. Copious bloody ET secretions. No pressors. Receiving dornase and TPA per CT. Right pleural effusion not large per CXR today.   4/21: WBC 31k. Bronchoscopy  yesterday showing blood. Cultures sent. TPA on hold per RN due to arvind blood from chest tube requiring PRBC transfusion for hgb 6. Pending chest CT today. Per , spinal stimulator is non-MRI compatible. Levophed 10mcg. 30% Fio2. Afebrile.   4/22: Fever 101.3 this am. WBC 20,300 down from 32,200 yesterday. BAL growing Klebsiella pneumoniae but susceptibility panel not set up until today. CTA of brain shows no lesion. CT of chest shows enlarging cavitary lung lesions bilat and large loculated new left pleural effusion and large hydropneumothorax on right. TPA & dornase infusions of right chest ended 4/20 after considerable bleeding requiring transfusion. Hb now down again to 6.8.  4/23: WBC 23k. Fio2 40%. Tmax 38.1. US of stiumlator shows small fluid collection but no drainable abscess. Pending MICAH today. Pending L chest tube placement  4/24: Continue fever 100.8-101.8. WBC 16,600. MICAH shows large vegetations on both tricuspid valve and mitral valve with mild TR & MR.  No aortic root abscess. Left chest tube placed yesterday yielding 8 ml of old bloody fluid. Bronch today revealed copious thick maroon secretions in distal trachea and both mainstem bronchi. On the right these were obstructive. Remaining off pressors. Last positive blood cultures (MSSA) were 4/16, negative 4/17.  4/25: Fever 100.6 this AM. wBC 13,300. Hb 6.6. CT: right hydropneumothorax increasing, right bronchopleural fistula, mediastinal shift ot the left , multiple cavitary pulmonary nodules, 3.1 cm left basilar mass, splenomegaly. CT of head shows dural thickening over the clivus. Lac+ GNR >100,000 col/ml growing from BAL of 4/24, presumed Klebsiella. Left peural fluid culture negative from 4/23.  4/26: Fever 100.4 last night. WBC 13,500. Hb 7.4. Plts 502,000. ESR >120. UA fairly clear except 30 mg% protein, 2-5 wbc and rare rbc. CXR unchanged except more prominent pulmonary edema. GNR in BAL may be a different species of Klebsiella, and  resistant to ampicillin & tmp-sulfa; confirmation pending.  4/28: Fever 100.8 last night. WBC 27,700. Hb 6.6. Plts 482,000. Creat 0.19. Kleb pneum in BAL on 4/24 is resistant to ampicillin & tmp-sulfa but otherwise sensitive. O2 sat 96% on FIO2 30% now, PEEP 8. Has been re-evaluated by thoracic surgeon, Dr. Ganser, who believes she will not wean without decortication on the right. Surgery anticipated today.  4/29: S/P right thoracoscopy with decortication with cultures NG at 24 hrs.  4/30: Had a bronch due to hypoxia and hypotension. CXR with worsening right hemithorax pulmonary opacities. Bloody mucous plugs removed. Pressors started. Febrile this am. Tachy in 130s. Pressors just removed. Tolerating vent, but not a SBT candidate at the moment.  5/1: Small air leak CT-2 every ~ 2/3 breathes. The K. pneumoniae from her thoracoscopy is sensitive to her cefepime so no antibiotic change needed.   5/2: No air leak today she tolerated 2  hrs of spontaneous breathing with support today.      5/3: Second day with SBT and doing well now for 2 hrs. Slight bump in temperature and      WBC's but no obvious clinical infectious changed so watch closely.      5/4: Fever still from time to time. WBCs trending up. Chest tubes in place. Trach.       5/5: She clearly is better today she was sitting up in bed with open eyes and follows commands. She still has low grade fever but her WBC's are dropping. Cefepime dose increased yesterday for increased CNS coverage if necessary. She will probably need further thoracic surgery as mentioned by Dr. Ganser. The issue of what to do with her stimulator is still up in the air for now as it probably will need to be removed but she is nort a candidate for more major surgery at this time.       5/7: She continues to improve and under go longer SBT trials. She just had a new PICC placed in her right arm and plan is to D/C central line.        5/8: PICC placed. No fevers. Comfortable. Cortrak placed.  Failing SBTs.  5/9: No acute issues. No fevers. Comfortable. Family at bedside.     Interval History:  Past 24 hrs reviewed with RN.    Labs Reviewed, Medications Reviewed, Radiology Reviewed, Wound Reviewed, Fluids Reviewed and GI Nutrition Reviewed    Review of Systems:  Review of Systems   Constitutional: Negative for fever.   Cardiovascular: Negative for chest pain.   Gastrointestinal: Negative for abdominal pain.       Physical Exam:  Physical Exam  Constitutional:       Appearance: Normal appearance.   HENT:      Nose:      Comments: NG  Neck:      Comments: ET  Cardiovascular:      Rate and Rhythm: Normal rate and regular rhythm.   Pulmonary:      Breath sounds: Rhonchi present.   Abdominal:      General: Abdomen is flat.   Skin:     General: Skin is warm and dry.   Neurological:      General: No focal deficit present.      Mental Status: She is alert.         Labs:  Recent Results (from the past 24 hour(s))   CBC with Differential    Collection Time: 05/09/21  5:18 AM   Result Value Ref Range    WBC 12.1 (H) 4.8 - 10.8 K/uL    RBC 3.15 (L) 4.20 - 5.40 M/uL    Hemoglobin 9.2 (L) 12.0 - 16.0 g/dL    Hematocrit 30.1 (L) 37.0 - 47.0 %    MCV 95.6 81.4 - 97.8 fL    MCH 29.2 27.0 - 33.0 pg    MCHC 30.6 (L) 33.6 - 35.0 g/dL    RDW 62.8 (H) 35.9 - 50.0 fL    Platelet Count 382 164 - 446 K/uL    MPV 9.3 9.0 - 12.9 fL    Neutrophils-Polys 79.20 (H) 44.00 - 72.00 %    Lymphocytes 11.90 (L) 22.00 - 41.00 %    Monocytes 6.90 0.00 - 13.40 %    Eosinophils 0.10 0.00 - 6.90 %    Basophils 0.40 0.00 - 1.80 %    Immature Granulocytes 1.50 (H) 0.00 - 0.90 %    Nucleated RBC 0.00 /100 WBC    Neutrophils (Absolute) 9.56 (H) 2.00 - 7.15 K/uL    Lymphs (Absolute) 1.43 1.00 - 4.80 K/uL    Monos (Absolute) 0.83 0.00 - 0.85 K/uL    Eos (Absolute) 0.01 0.00 - 0.51 K/uL    Baso (Absolute) 0.05 0.00 - 0.12 K/uL    Immature Granulocytes (abs) 0.18 (H) 0.00 - 0.11 K/uL    NRBC (Absolute) 0.00 K/uL   Basic Metabolic Panel (BMP)     "Collection Time: 21  5:18 AM   Result Value Ref Range    Sodium 136 135 - 145 mmol/L    Potassium 3.8 3.6 - 5.5 mmol/L    Chloride 99 96 - 112 mmol/L    Co2 32 20 - 33 mmol/L    Glucose 95 65 - 99 mg/dL    Bun 14 8 - 22 mg/dL    Creatinine <0.17 (L) 0.50 - 1.40 mg/dL    Calcium 9.0 8.5 - 10.5 mg/dL    Anion Gap 5.0 (L) 7.0 - 16.0   Magnesium    Collection Time: 21  5:18 AM   Result Value Ref Range    Magnesium 1.7 1.5 - 2.5 mg/dL   ESTIMATED GFR    Collection Time: 21  5:18 AM   Result Value Ref Range    GFR If African American >60 >60 mL/min/1.73 m 2    GFR If Non African American >60 >60 mL/min/1.73 m 2     Results     Procedure Component Value Units Date/Time    BLOOD CULTURE [[id number]] Collected: 21 1200    Order Status: Completed Specimen: Blood from Peripheral Updated: 21 1300     Significant Indicator NEG     Source BLD     Site PERIPHERAL     Culture Result No growth after 5 days of incubation.    Narrative:      Collected By:[id number] FERNANDA WISE  Per Hospital Policy: Only change Specimen Src: to \"Line\" if  specified by physician order.  Left AC    BLOOD CULTURE [[id number]] Collected: 21 1150    Order Status: Completed Specimen: Blood from Peripheral Updated: 21 1300     Significant Indicator NEG     Source BLD     Site PERIPHERAL     Culture Result No growth after 5 days of incubation.    Narrative:      Collected By:[id number] FERNANDA WISE  Per Hospital Policy: Only change Specimen Src: to \"Line\" if  specified by physician order.  Right Hand        Hemodynamics:  Temp (24hrs), Av.3 °C (97.3 °F), Min:36 °C (96.8 °F), Max:36.5 °C (97.7 °F)  Temperature: 36.5 °C (97.7 °F)  Pulse  Av.8  Min: 40  Max: 149   Blood Pressure: 139/109    PICC Double Lumen 21 Lumen 1: Purple Lumen 2: Red Right Brachial (Active)   Site Assessment Clean;Dry;Intact 21 0800   Lumen 1 Status Blood return noted;Flushed;Normal saline locked 21 0800   Lumen 2 " Status Blood return noted;Flushed;Normal saline locked 05/09/21 0800   Line Secured at (cm) 1 cm 05/07/21 1118   Extremity Circumference (cm) 30.5 cm 05/07/21 1118   Dressing Type Biopatch;Securing device;Skin barrier;Transparent 05/09/21 0800   Dressing Status Clean;Dry;Intact 05/09/21 0800   Dressing Intervention N/A 05/08/21 2000   Dressing Change Due 05/08/21 05/09/21 0800   Date IV Connector(s) Changed 05/07/21 05/07/21 1118   NEXT IV Connector(s) Change 05/08/21 05/07/21 1118   Line Necessity Assessed Antibiotic Therapy Greater than 7 Days 05/09/21 0800   $ Double Lumen PICC Charge Single kit used 05/07/21 1118     Wound:  @WOUNDLDA(4)@     Fluids:  Intake/Output       05/07/21 0700 - 05/08/21 0659 05/08/21 0700 - 05/09/21 0659 05/09/21 0700 - 05/10/21 0659      2555-3919 6342-7856 Total 5260-8415 1397-3659 Total 5156-3802 5455-0398 Total       Intake    I.V.  203.1  -- 203.1  1637.4  0 1637.4  --  -- --    Albumin Volume -- -- -- 1510 -- 1510 -- -- --    Magnesium Sulfate Volume 136.7 -- 136.7 50 -- 50 -- -- --    Precedex Volume 66.4 -- 66.4 77.4 0 77.4 -- -- --    Other  350  -- 350  --  -- --  --  -- --    Medications (PO/Enteral Liquids) 350 -- 350 -- -- -- -- -- --    NG/GT  180  -- 180  540  540 1080  90  -- 90    Intake (mL) (Enteral Tube 05/07/21 10 Fr. Right nare) 180 -- 180  90 -- 90    IV Piggyback  200  -- 200  300  -- 300  --  -- --    Volume (mL) (cefepime (MAXIPIME) 2 g in  mL IVPB) 200 -- 200 300 -- 300 -- -- --    Enteral  --  300 300  --  60 60  --  -- --    Free Water / Tube Flush -- 300 300 -- 60 60 -- -- --    Total Intake 933.1 300 1233.1 2477.4 600 3077.4 90 -- 90       Output    Urine  2800  550 3350  200  500 700  --  -- --    Number of Times Voided -- 2 x 2 x 3 x 2 x 5 x -- -- --    Urine Void (mL) 6966 482 1863 200 500 700 -- -- --    Output (mL) ([REMOVED] Urethral Catheter Temperature probe) 1750 -- 1750 -- -- -- -- -- --    Stool  --  -- --  --  -- --  --  -- --     Number of Times Stooled -- 1 x 1 x -- -- -- -- -- --    Chest Tube  30  0 30  20  20 40  --  -- --    Output (mL) (Chest Tube 2 Right Midaxillary) 10 0 10 20 20 40 -- -- --    Output (mL) (Chest Tube 1 Right Midaxillary;Pleural) 20 0 20 0 0 0 -- -- --    Total Output 2830 550 3380 220 520 740 -- -- --       Net I/O     -1896.9 -250 -2146.9 2257.4 80 2337.4 90 -- 90        Weight: 75.9 kg (167 lb 5.3 oz)  GI/Nutrition:  Orders Placed This Encounter   Procedures   • Diet NPO     Standing Status:   Standing     Number of Occurrences:   1     Order Specific Question:   Restrict to:     Answer:   Strict [1]     Medications:  Current Facility-Administered Medications   Medication Last Admin   • gabapentin (NEURONTIN) capsule 300 mg 300 mg at 05/09/21 1316   • cloNIDine (CATAPRES) tablet 0.1 mg 0.1 mg at 05/09/21 1316   • HYDROmorphone (Dilaudid) injection 1-2 mg 2 mg at 05/09/21 1023   • methadone (DOLOPHINE) 10 MG/ML solution 5 mg 5 mg at 05/09/21 1315   • oxyCODONE immediate release (ROXICODONE) tablet 20 mg 20 mg at 05/09/21 1316   • traMADol (ULTRAM) 50 MG tablet 50 mg 50 mg at 05/06/21 2227   • dexmedetomidine (PRECEDEX) 400 mcg/100mL NS premix infusion Stopped at 05/08/21 1800   • cefepime (MAXIPIME) 2 g in  mL IVPB Stopped at 05/09/21 0958   • labetalol (NORMODYNE/TRANDATE) injection 10 mg 10 mg at 05/04/21 0226   • enoxaparin (LOVENOX) inj 40 mg 40 mg at 05/09/21 0507   • Pharmacy Consult: Enteral tube insertion - review meds/change route/product selection     • acetaminophen (Tylenol) tablet 650 mg 650 mg at 05/06/21 0153   • magnesium hydroxide (MILK OF MAGNESIA) suspension 30 mL      And   • senna-docusate (PERICOLACE or SENOKOT S) 8.6-50 MG per tablet 2 tablet 2 tablet at 05/09/21 0508    And   • polyethylene glycol/lytes (MIRALAX) PACKET 1 Packet      And   • bisacodyl (DULCOLAX) suppository 10 mg     • divalproex (DEPAKOTE SPRINKLE) capsule 500 mg 500 mg at 05/09/21 1315   • DULoxetine (CYMBALTA)  capsule 60 mg 60 mg at 05/09/21 0508   • PARoxetine (PAXIL) tablet 10 mg 10 mg at 05/09/21 0508   • risperiDONE (RISPERDAL) tablet 2 mg 2 mg at 05/09/21 0508   • Respiratory Therapy Consult     • famotidine (PEPCID) tablet 20 mg 20 mg at 05/09/21 0509   • MD Alert...ICU Electrolyte Replacement per Pharmacy     • lidocaine (XYLOCAINE) 1 % injection 1-2 mL     • ipratropium-albuterol (DUONEB) nebulizer solution       Medical Decision Making, by Problem:  Active Hospital Problems    Diagnosis    • *Bacteremia due to methicillin susceptible Staphylococcus aureus (MSSA) [R78.81, B95.61]    • Agitation requiring sedation protocol [R45.1]    • 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]    • Acute respiratory failure with hypoxia (HCC) [J96.01]    • Acute bacterial endocarditis [I33.0]    • Empyema of right pleural space (HCC) [J86.9]    • Acute encephalopathy [G93.40]    • Fever [R50.9]    • Pleural effusion, left [J90]    • Atelectasis [J98.11]    • CSF pleocytosis [D72.9]    • Pneumonia [J18.9]    • Tachycardia [R00.0]    • Pseudotumor cerebri [G93.2]    • S/P  shunt [Z98.2]    • Prolonged Q-T interval on ECG [R94.31]    • History of vasculitis [Z86.79]    • History of complicated migraines [G43.109]    • Hyponatremia [E87.1]    • H/O: CVA (cerebrovascular accident) [Z86.73]    • Chronic pain syndrome [G89.4]    • Thrombocytopenia (HCC) [D69.6]    • Anxiety [F41.9]    • Spinal cord stimulator status [Z96.89]    • Goals of care, counseling/discussion [Z71.89]    • Abnormal movement [G25.9]    • Pulmonary abscess (HCC) [J85.2]    • Anasarca [R60.1]    • Thrombocytosis (HCC) [D47.3]    • GERD (gastroesophageal reflux disease) [K21.9]    • Septic shock (HCC) [A41.9, R65.21]    • Anemia [D64.9]    • Hypokalemia [E87.6]      Impression   -Severe sepsis  -Catheter related bloodstream infection due to infected port status post removal 4/12-staph aureus  -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       - Acute left empyema s/p chest tube placement 4/23  -Multiple acute septic pulmonary emboli bilaterally  -Acute bilateral pneumonia due to above     --Pulmonary edema  -Pseudotumor cerebri with underlying  shunt: possible arachnoiditis  -Chronic migraine headaches  -History of autoimmune vasculitis  -Elevated alkaline phosphatase & bilirubin  -Acute encephalopathy due to sepsis      - anemia due to above      - New secondary Klebsiella pneumoniae pneumonia (presumed VAP)     Plan   - Persistent fever likely attributable to empyema and multiple lung abscesses. Currently covered. Source control. Fever resolved.  - Continue cefepime - clinical endpoint - will assess CT scan with surgery tomorrow. Await their decision making for source control.  - Vent management per pulmonology - SBT trials  - Tolerated the decortication. Cx NGTD.   - No IV abx changes or further ID workup in the moment  - Continue to monitor closely  - Improving overall. Source control issues. Abx fine.   - Current abx covering CNS and back hardware. This HW is not coming out anytime soon per NS.  - New line in place.  - No ID changes today     Case and treatment reviewed with patient(best possible), family, and nursing    > 35 min on floor in ICU with > 50% face to face view and 100% in direct patient care/counseling today.    Dr Loweyr

## 2021-05-10 ENCOUNTER — APPOINTMENT (OUTPATIENT)
Dept: RADIOLOGY | Facility: MEDICAL CENTER | Age: 51
DRG: 003 | End: 2021-05-10
Attending: INTERNAL MEDICINE
Payer: MEDICARE

## 2021-05-10 ENCOUNTER — HOSPITAL ENCOUNTER (OUTPATIENT)
Dept: RADIOLOGY | Facility: MEDICAL CENTER | Age: 51
End: 2021-05-10
Attending: INTERNAL MEDICINE
Payer: MEDICARE

## 2021-05-10 LAB
ALBUMIN SERPL BCP-MCNC: 2.8 G/DL (ref 3.2–4.9)
ALBUMIN/GLOB SERPL: 0.7 G/DL
ALP SERPL-CCNC: 1073 U/L (ref 30–99)
ALT SERPL-CCNC: 28 U/L (ref 2–50)
ANION GAP SERPL CALC-SCNC: 8 MMOL/L (ref 7–16)
AST SERPL-CCNC: 17 U/L (ref 12–45)
BASOPHILS # BLD AUTO: 0.5 % (ref 0–1.8)
BASOPHILS # BLD: 0.05 K/UL (ref 0–0.12)
BILIRUB SERPL-MCNC: 0.6 MG/DL (ref 0.1–1.5)
BUN SERPL-MCNC: 14 MG/DL (ref 8–22)
CALCIUM SERPL-MCNC: 8.7 MG/DL (ref 8.5–10.5)
CHLORIDE SERPL-SCNC: 101 MMOL/L (ref 96–112)
CO2 SERPL-SCNC: 28 MMOL/L (ref 20–33)
CREAT SERPL-MCNC: <0.17 MG/DL (ref 0.5–1.4)
CRP SERPL HS-MCNC: 10.3 MG/DL (ref 0–0.75)
EOSINOPHIL # BLD AUTO: 0.01 K/UL (ref 0–0.51)
EOSINOPHIL NFR BLD: 0.1 % (ref 0–6.9)
ERYTHROCYTE [DISTWIDTH] IN BLOOD BY AUTOMATED COUNT: 61.5 FL (ref 35.9–50)
ERYTHROCYTE [SEDIMENTATION RATE] IN BLOOD BY WESTERGREN METHOD: 130 MM/HOUR (ref 0–25)
GLOBULIN SER CALC-MCNC: 4.2 G/DL (ref 1.9–3.5)
GLUCOSE SERPL-MCNC: 97 MG/DL (ref 65–99)
HCT VFR BLD AUTO: 29.1 % (ref 37–47)
HGB BLD-MCNC: 8.8 G/DL (ref 12–16)
IMM GRANULOCYTES # BLD AUTO: 0.13 K/UL (ref 0–0.11)
IMM GRANULOCYTES NFR BLD AUTO: 1.3 % (ref 0–0.9)
LYMPHOCYTES # BLD AUTO: 1.32 K/UL (ref 1–4.8)
LYMPHOCYTES NFR BLD: 12.7 % (ref 22–41)
MAGNESIUM SERPL-MCNC: 1.8 MG/DL (ref 1.5–2.5)
MCH RBC QN AUTO: 29 PG (ref 27–33)
MCHC RBC AUTO-ENTMCNC: 30.2 G/DL (ref 33.6–35)
MCV RBC AUTO: 96 FL (ref 81.4–97.8)
MONOCYTES # BLD AUTO: 0.84 K/UL (ref 0–0.85)
MONOCYTES NFR BLD AUTO: 8.1 % (ref 0–13.4)
NEUTROPHILS # BLD AUTO: 8.05 K/UL (ref 2–7.15)
NEUTROPHILS NFR BLD: 77.3 % (ref 44–72)
NRBC # BLD AUTO: 0 K/UL
NRBC BLD-RTO: 0 /100 WBC
PLATELET # BLD AUTO: 349 K/UL (ref 164–446)
PMV BLD AUTO: 9.2 FL (ref 9–12.9)
POTASSIUM SERPL-SCNC: 3.9 MMOL/L (ref 3.6–5.5)
PREALB SERPL-MCNC: 17.4 MG/DL (ref 18–38)
PROT SERPL-MCNC: 7 G/DL (ref 6–8.2)
RBC # BLD AUTO: 3.03 M/UL (ref 4.2–5.4)
SODIUM SERPL-SCNC: 137 MMOL/L (ref 135–145)
WBC # BLD AUTO: 10.4 K/UL (ref 4.8–10.8)

## 2021-05-10 PROCEDURE — 700102 HCHG RX REV CODE 250 W/ 637 OVERRIDE(OP): Performed by: INTERNAL MEDICINE

## 2021-05-10 PROCEDURE — 770022 HCHG ROOM/CARE - ICU (200)

## 2021-05-10 PROCEDURE — 94640 AIRWAY INHALATION TREATMENT: CPT

## 2021-05-10 PROCEDURE — 94799 UNLISTED PULMONARY SVC/PX: CPT

## 2021-05-10 PROCEDURE — A9270 NON-COVERED ITEM OR SERVICE: HCPCS | Performed by: INTERNAL MEDICINE

## 2021-05-10 PROCEDURE — 700105 HCHG RX REV CODE 258: Performed by: INTERNAL MEDICINE

## 2021-05-10 PROCEDURE — 700111 HCHG RX REV CODE 636 W/ 250 OVERRIDE (IP): Performed by: INTERNAL MEDICINE

## 2021-05-10 PROCEDURE — 86140 C-REACTIVE PROTEIN: CPT

## 2021-05-10 PROCEDURE — 700117 HCHG RX CONTRAST REV CODE 255: Performed by: INTERNAL MEDICINE

## 2021-05-10 PROCEDURE — 85652 RBC SED RATE AUTOMATED: CPT

## 2021-05-10 PROCEDURE — 85025 COMPLETE CBC W/AUTO DIFF WBC: CPT

## 2021-05-10 PROCEDURE — 94003 VENT MGMT INPAT SUBQ DAY: CPT

## 2021-05-10 PROCEDURE — 71260 CT THORAX DX C+: CPT | Mod: MG

## 2021-05-10 PROCEDURE — 83735 ASSAY OF MAGNESIUM: CPT

## 2021-05-10 PROCEDURE — 99291 CRITICAL CARE FIRST HOUR: CPT | Performed by: INTERNAL MEDICINE

## 2021-05-10 PROCEDURE — 71045 X-RAY EXAM CHEST 1 VIEW: CPT

## 2021-05-10 PROCEDURE — 84134 ASSAY OF PREALBUMIN: CPT

## 2021-05-10 PROCEDURE — 80053 COMPREHEN METABOLIC PANEL: CPT

## 2021-05-10 RX ORDER — MAGNESIUM SULFATE HEPTAHYDRATE 40 MG/ML
2 INJECTION, SOLUTION INTRAVENOUS ONCE
Status: COMPLETED | OUTPATIENT
Start: 2021-05-10 | End: 2021-05-10

## 2021-05-10 RX ADMIN — POTASSIUM BICARBONATE 25 MEQ: 978 TABLET, EFFERVESCENT ORAL at 07:46

## 2021-05-10 RX ADMIN — DOCUSATE SODIUM 50 MG AND SENNOSIDES 8.6 MG 2 TABLET: 8.6; 5 TABLET, FILM COATED ORAL at 17:30

## 2021-05-10 RX ADMIN — CEFEPIME 2 G: 2 INJECTION, POWDER, FOR SOLUTION INTRAVENOUS at 09:31

## 2021-05-10 RX ADMIN — CLONIDINE HYDROCHLORIDE 0.1 MG: 0.1 TABLET ORAL at 13:14

## 2021-05-10 RX ADMIN — DIVALPROEX SODIUM 500 MG: 125 CAPSULE ORAL at 13:14

## 2021-05-10 RX ADMIN — CEFEPIME 2 G: 2 INJECTION, POWDER, FOR SOLUTION INTRAVENOUS at 17:28

## 2021-05-10 RX ADMIN — RISPERIDONE 2 MG: 1 TABLET, FILM COATED ORAL at 17:30

## 2021-05-10 RX ADMIN — OXYCODONE HYDROCHLORIDE 20 MG: 10 TABLET ORAL at 05:14

## 2021-05-10 RX ADMIN — HYDROMORPHONE HYDROCHLORIDE 2 MG: 1 INJECTION, SOLUTION INTRAMUSCULAR; INTRAVENOUS; SUBCUTANEOUS at 11:45

## 2021-05-10 RX ADMIN — OXYCODONE HYDROCHLORIDE 20 MG: 10 TABLET ORAL at 01:16

## 2021-05-10 RX ADMIN — GABAPENTIN 300 MG: 300 CAPSULE ORAL at 05:14

## 2021-05-10 RX ADMIN — CLONIDINE HYDROCHLORIDE 0.1 MG: 0.1 TABLET ORAL at 05:15

## 2021-05-10 RX ADMIN — GABAPENTIN 300 MG: 300 CAPSULE ORAL at 21:33

## 2021-05-10 RX ADMIN — CLONIDINE HYDROCHLORIDE 0.1 MG: 0.1 TABLET ORAL at 21:34

## 2021-05-10 RX ADMIN — HYDROMORPHONE HYDROCHLORIDE 2 MG: 1 INJECTION, SOLUTION INTRAMUSCULAR; INTRAVENOUS; SUBCUTANEOUS at 07:45

## 2021-05-10 RX ADMIN — TRAMADOL HYDROCHLORIDE 50 MG: 50 TABLET ORAL at 03:20

## 2021-05-10 RX ADMIN — METHADONE HYDROCHLORIDE 5 MG: 10 CONCENTRATE ORAL at 21:33

## 2021-05-10 RX ADMIN — OXYCODONE HYDROCHLORIDE 20 MG: 10 TABLET ORAL at 09:31

## 2021-05-10 RX ADMIN — DIVALPROEX SODIUM 500 MG: 125 CAPSULE ORAL at 21:33

## 2021-05-10 RX ADMIN — MAGNESIUM SULFATE 2 G: 2 INJECTION INTRAVENOUS at 07:46

## 2021-05-10 RX ADMIN — DOCUSATE SODIUM 50 MG AND SENNOSIDES 8.6 MG 2 TABLET: 8.6; 5 TABLET, FILM COATED ORAL at 05:15

## 2021-05-10 RX ADMIN — METHADONE HYDROCHLORIDE 5 MG: 10 CONCENTRATE ORAL at 13:13

## 2021-05-10 RX ADMIN — TRAMADOL HYDROCHLORIDE 50 MG: 50 TABLET ORAL at 07:45

## 2021-05-10 RX ADMIN — METHADONE HYDROCHLORIDE 5 MG: 10 CONCENTRATE ORAL at 05:15

## 2021-05-10 RX ADMIN — OXYCODONE HYDROCHLORIDE 20 MG: 10 TABLET ORAL at 17:30

## 2021-05-10 RX ADMIN — HYDROMORPHONE HYDROCHLORIDE 2 MG: 1 INJECTION, SOLUTION INTRAMUSCULAR; INTRAVENOUS; SUBCUTANEOUS at 15:51

## 2021-05-10 RX ADMIN — ENOXAPARIN SODIUM 40 MG: 40 INJECTION SUBCUTANEOUS at 05:14

## 2021-05-10 RX ADMIN — GABAPENTIN 300 MG: 300 CAPSULE ORAL at 13:14

## 2021-05-10 RX ADMIN — PAROXETINE HYDROCHLORIDE 10 MG: 20 TABLET, FILM COATED ORAL at 05:15

## 2021-05-10 RX ADMIN — TRAMADOL HYDROCHLORIDE 50 MG: 50 TABLET ORAL at 13:14

## 2021-05-10 RX ADMIN — DULOXETINE HYDROCHLORIDE 60 MG: 60 CAPSULE, DELAYED RELEASE ORAL at 17:30

## 2021-05-10 RX ADMIN — OXYCODONE HYDROCHLORIDE 20 MG: 10 TABLET ORAL at 21:33

## 2021-05-10 RX ADMIN — HYDROMORPHONE HYDROCHLORIDE 2 MG: 1 INJECTION, SOLUTION INTRAMUSCULAR; INTRAVENOUS; SUBCUTANEOUS at 03:57

## 2021-05-10 RX ADMIN — DULOXETINE HYDROCHLORIDE 60 MG: 60 CAPSULE, DELAYED RELEASE ORAL at 05:15

## 2021-05-10 RX ADMIN — CEFEPIME 2 G: 2 INJECTION, POWDER, FOR SOLUTION INTRAVENOUS at 01:12

## 2021-05-10 RX ADMIN — DIVALPROEX SODIUM 500 MG: 125 CAPSULE ORAL at 05:14

## 2021-05-10 RX ADMIN — IOHEXOL 100 ML: 350 INJECTION, SOLUTION INTRAVENOUS at 14:50

## 2021-05-10 RX ADMIN — OXYCODONE HYDROCHLORIDE 20 MG: 10 TABLET ORAL at 13:14

## 2021-05-10 RX ADMIN — FAMOTIDINE 20 MG: 20 TABLET ORAL at 17:30

## 2021-05-10 RX ADMIN — FAMOTIDINE 20 MG: 20 TABLET ORAL at 05:15

## 2021-05-10 RX ADMIN — RISPERIDONE 2 MG: 1 TABLET, FILM COATED ORAL at 05:14

## 2021-05-10 ASSESSMENT — PAIN DESCRIPTION - PAIN TYPE
TYPE: ACUTE PAIN
TYPE: CHRONIC PAIN
TYPE: ACUTE PAIN
TYPE: CHRONIC PAIN
TYPE: CHRONIC PAIN
TYPE: ACUTE PAIN
TYPE: CHRONIC PAIN
TYPE: ACUTE PAIN
TYPE: CHRONIC PAIN
TYPE: CHRONIC PAIN
TYPE: ACUTE PAIN;CHRONIC PAIN
TYPE: CHRONIC PAIN
TYPE: ACUTE PAIN
TYPE: CHRONIC PAIN
TYPE: ACUTE PAIN
TYPE: CHRONIC PAIN
TYPE: CHRONIC PAIN
TYPE: ACUTE PAIN;CHRONIC PAIN

## 2021-05-10 ASSESSMENT — ENCOUNTER SYMPTOMS
FEVER: 0
ABDOMINAL PAIN: 0

## 2021-05-10 ASSESSMENT — FIBROSIS 4 INDEX: FIB4 SCORE: 0.44

## 2021-05-10 NOTE — RESPIRATORY CARE
Ventilator Daily Summary    Vent Day # 24, Trach Day#9    Spont 8/8    Ventilator settings changed this shift:none      Weaning trials:Pt not want to go on t-piece, gets very anxious.     Respiratory Procedures:    Plan: Continue current ventilator settings and wean mechanical ventilation as tolerated per physician orders.

## 2021-05-10 NOTE — THERAPY
Missed Therapy     Patient Name: Enedelia Pang  Age:  50 y.o., Sex:  female  Medical Record #: [id number]  Today's Date: 5/10/2021    Discussed missed therapy with RT       05/10/21 9113   Treatment Variance   Reason For Missed Therapy Medical - Other (Please Comment)   Interdisciplinary Plan of Care Collaboration   Collaboration Comments Order received and acknowledged for a speaking valve.  Per EMR notes, patient anxious to transition off the vent to T-Piece.  Collaborated with RT to initiate education to use of a speaking valve in preperation of placing the valve tomorrow.

## 2021-05-10 NOTE — PROGRESS NOTES
"Critical Care Progress Note    Date of admission  4/16/2021    Chief Complaint  50 y.o. female the past medical history of pseudotumor cerebri status post  shunt by Dr. Oh, chronic pain with history of placement of nerve stimulator, vasculitis, right anterior chest port for home IV meds with recurrent infection who presented 4/11/2021 with to [facility] with recurrent port infection and was initially treated with vancomycin and Zosyn and then changed to ceftaroline and subsequently switched to nafcillin when MSSA was identified.    Hospital Course  \"50 y.o. female the past medical history of pseudotumor cerebri status post  shunt by Dr. Oh, chronic pain with history of placement of nerve stimulator, vasculitis, right anterior chest port for home IV meds with recurrent infection who presented 4/11/2021 with to [facility] with recurrent port infection. Found to have MSSA bacteremia, endocarditis, septic pulmonary emboli/empyema/pneumatocele, and CSF pleocytosis concerning for  shunt involvement.  Seen by Dr. Oh, NSGY, at [facility] who did not recommend  shunt removal.  Seen by Dr. Vargas here for concern of fluid collection around her spinal stimulator and he recommended against removal. Remains intubated, encephalopathic.    4/16 admitted to ICU  4/17 VD 2, 2 FFP, pRBC overnight; new chest tube per gen surg  4/18 VD 3, TPa/Dornase with 1400 cc from R chest tube    4/26: R chest tube not functioning, d/c it.  broke his leg and going to OR today, so not available currently.  VD 11.  8/30%. RSBI immediately high on SBT attempt. Followed commands for RN  4/27: VD12. 8/30%. Not tolerating wean, high RSBI and tachycardic.   4/28: VD 13. VATS with Dr. Ganser.    4/29: VD 14. 8/30%. Still not tolerating wean d/t severe tachycardia just with SAT. Trialed precedex and she required fentanyl up to 600/hr to tolerate, so back on propofol. Chest tubes -40  4/30: VD 15. 8/30%. Try ketamine/versed as sedative. Severe " tachycardia and HTN with weaning down propofol. Plan for perc trach tomorrow. Chest tubes -40  5/1: perc trach. PICC placed. Chest tubes remain -40.  5/2: Able to spont well on sedation, but very tachy/HTN when sedation is turned down.  5/3 Versed weaned down to 3mg/min.   5/4 off versed and ketamine gtt  5/5 trials of SBT w/ trach  5/6 T piece trial during day, mobilize to chair x2, rest vent 8/8 at night, started methadone, gabapentin   5/7 T piece during day with, rest PS 8/8 overnight, spacing out dilaudid prn's  5/8 T piece during day, still with anxiety weaning dex gtt      Interval Problem Update   - CT #1 20 mL, CT #2 0 mL   - Neuro: awake, follows   - HR: SR-ST   - SBP: 90s-130s   - GI: TF at goal   - UOP: not monitoring   - Lopez: no   - Tm: 36.8   - Lines: PICC   - PPx: GI pepcid, DVT lovenox   - cefepime for endocarditis   - T-piece during the day and PS at night only if need   - CT with continued empyema and cavitary lesions, biliary duct dilation   - Mobility level 5, eligible for progression n/a    Yesterday  off dex since 6pm, aox4 writes on board, CAM ICU neg  HR: tachy  SBP: 's  Tmax: afebrile  GI: BM 5/7  UOP: good purwick  Lines: picc line  Resp: T piece 5.5 hrs per patient request PS 5/8 min secretions  Vte: lovenox  PPI/H2:pepcid  Antibx: cefepmine  Urine output volume status?  Speech eval for speaking valve  Mobility walk today  D/c versed prn    Review of Systems  Review of Systems   Unable to perform ROS: Intubated        Vital Signs for last 24 hours   Temp:  [36.4 °C (97.5 °F)-36.8 °C (98.3 °F)] 36.8 °C (98.3 °F)  Pulse:  [] 117  Resp:  [11-27] 18  BP: ()/(45-82) 97/50  SpO2:  [93 %-98 %] 98 %    Hemodynamic parameters for last 24 hours       Respiratory Information for the last 24 hours  Vent Mode: Spont  PEEP/CPAP: 8  P Support: 5  MAP: 9.9  Length of Weaning Trial (Hours): ongoing  Control VTE (exp VT): 434    Physical Exam   Physical Exam  Vitals and nursing note  reviewed.   Constitutional:       Appearance: She is ill-appearing.      Interventions: She is sedated and intubated.      Comments: Chronically ill appearing sitting in bed, trach in place   HENT:      Head: Atraumatic.      Comments: temporal wasting     Right Ear: External ear normal.      Left Ear: External ear normal.      Nose:      Comments: cortak in place     Mouth/Throat:      Mouth: Mucous membranes are dry.   Eyes:      Pupils: Pupils are equal, round, and reactive to light.   Neck:      Comments: Trach in place  8.0 Portex  Cardiovascular:      Rate and Rhythm: Regular rhythm. Tachycardia present.      Heart sounds: Murmur present.   Pulmonary:      Effort: No respiratory distress. She is intubated.      Breath sounds: No stridor. No wheezing or rhonchi.      Comments: Some air movement to right chest with course sounds, clear left without wheezing or rales. CT x2 with bloody output  Abdominal:      General: Bowel sounds are normal. There is no distension.      Tenderness: There is no abdominal tenderness. There is no guarding.   Musculoskeletal:      Cervical back: No rigidity or tenderness.      Right lower leg: Edema present.      Left lower leg: Edema present.      Comments: Edema to hands and feet   Skin:     General: Skin is warm and dry.      Capillary Refill: Capillary refill takes 2 to 3 seconds.      Coloration: Skin is pale.   Neurological:      Comments: Awake able to mouth words, shakes head yes and no, ext weak bilateral moves all extremities, no numbness to extremities. Writes on a white board.    Psychiatric:         Mood and Affect: Mood is anxious.         Medications  Current Facility-Administered Medications   Medication Dose Route Frequency Provider Last Rate Last Admin   • gabapentin (NEURONTIN) capsule 300 mg  300 mg Enteral Tube Q8HRS Juventino Lozano M.D.   300 mg at 05/10/21 0514   • cloNIDine (CATAPRES) tablet 0.1 mg  0.1 mg Enteral Tube Q8HRS Juventino Lozano M.D.   0.1 mg  at 05/10/21 0515   • HYDROmorphone (Dilaudid) injection 1-2 mg  1-2 mg Intravenous Q4HRS PRN Juventino Lozano M.D.   2 mg at 05/10/21 0745   • methadone (DOLOPHINE) 10 MG/ML solution 5 mg  5 mg Enteral Tube Q8HRS Juventino Lozano M.D.   5 mg at 05/10/21 0515   • oxyCODONE immediate release (ROXICODONE) tablet 20 mg  20 mg Enteral Tube Q4HRS Jonathan Upton D.O.   20 mg at 05/10/21 0931   • traMADol (ULTRAM) 50 MG tablet 50 mg  50 mg Enteral Tube Q6HRS PRN LEYLA Caban.OFrederick   50 mg at 05/10/21 0745   • dexmedetomidine (PRECEDEX) 400 mcg/100mL NS premix infusion  0.1-1.5 mcg/kg/hr Intravenous Continuous Jonathan Upton D.O.   Stopped at 05/08/21 1800   • cefepime (MAXIPIME) 2 g in  mL IVPB  2 g Intravenous Q8HRS Eric Lowery M.D. 200 mL/hr at 05/10/21 0931 2 g at 05/10/21 0931   • labetalol (NORMODYNE/TRANDATE) injection 10 mg  10 mg Intravenous Q4HRS PRN Leti Sun M.D.   10 mg at 05/04/21 0226   • enoxaparin (LOVENOX) inj 40 mg  40 mg Subcutaneous DAILY Benoit Loya Jr., D.O.   40 mg at 05/10/21 0514   • Pharmacy Consult: Enteral tube insertion - review meds/change route/product selection  1 Each Other PHARMACY TO DOSE Elliott Salvador M.D.       • acetaminophen (Tylenol) tablet 650 mg  650 mg Enteral Tube Q4HRS PRN Elliott Salvador M.D.   650 mg at 05/06/21 0153   • magnesium hydroxide (MILK OF MAGNESIA) suspension 30 mL  30 mL Enteral Tube QDAY PRN Elliott Salvador M.D.        And   • senna-docusate (PERICOLACE or SENOKOT S) 8.6-50 MG per tablet 2 tablet  2 tablet Enteral Tube BID Elliott Salvador M.D.   2 tablet at 05/10/21 0515    And   • polyethylene glycol/lytes (MIRALAX) PACKET 1 Packet  1 Packet Enteral Tube QDAY PRN Elliott Salvador M.D.        And   • bisacodyl (DULCOLAX) suppository 10 mg  10 mg Rectal QDAY PRN Elliott Salvador M.D.       • divalproex (DEPAKOTE SPRINKLE) capsule 500 mg  500 mg Enteral Tube Q8HRS Elliott Salvador M.D.   500 mg at 05/10/21 0514   • DULoxetine (CYMBALTA)  capsule 60 mg  60 mg Enteral Tube BID Elliott Salvador M.D.   60 mg at 05/10/21 0515   • PARoxetine (PAXIL) tablet 10 mg  10 mg Enteral Tube QAM Elliott Salvador M.D.   10 mg at 05/10/21 0515   • risperiDONE (RISPERDAL) tablet 2 mg  2 mg Enteral Tube BID Elliott Salvador M.D.   2 mg at 05/10/21 0514   • Respiratory Therapy Consult   Nebulization Continuous RT Man Wahl M.D.       • famotidine (PEPCID) tablet 20 mg  20 mg Enteral Tube Q12HRS Man Wahl M.D.   20 mg at 05/10/21 0515   • MD Alert...ICU Electrolyte Replacement per Pharmacy   Other PHARMACY TO DOSE Man Wahl M.D.       • lidocaine (XYLOCAINE) 1 % injection 1-2 mL  1-2 mL Tracheal Tube Q30 MIN PRN Man Wahl M.D.       • ipratropium-albuterol (DUONEB) nebulizer solution  3 mL Nebulization Q2HRS PRN (RT) Man Wahl M.D.           Fluids    Intake/Output Summary (Last 24 hours) at 5/10/2021 1104  Last data filed at 5/10/2021 0800  Gross per 24 hour   Intake 960 ml   Output 650 ml   Net 310 ml       Laboratory          Recent Labs     05/08/21 0515 05/09/21  0518 05/10/21  0400   SODIUM 137 136 137   POTASSIUM 4.2 3.8 3.9   CHLORIDE 99 99 101   CO2 32 32 28   BUN 16 14 14   CREATININE <0.17* <0.17* <0.17*   MAGNESIUM 1.9 1.7 1.8   PHOSPHORUS 3.3  --   --    CALCIUM 8.5 9.0 8.7     Recent Labs     05/08/21 0515 05/09/21  0518 05/10/21  0400   ALTSGPT  --   --  28   ASTSGOT  --   --  17   ALKPHOSPHAT  --   --  1073*   TBILIRUBIN  --   --  0.6   GLUCOSE 106* 95 97     Recent Labs     05/08/21 0515 05/09/21  0518 05/10/21  0400   WBC 12.9* 12.1* 10.4   NEUTSPOLYS 80.60* 79.20* 77.30*   LYMPHOCYTES 11.20* 11.90* 12.70*   MONOCYTES 6.50 6.90 8.10   EOSINOPHILS 0.00 0.10 0.10   BASOPHILS 0.50 0.40 0.50   ASTSGOT  --   --  17   ALTSGPT  --   --  28   ALKPHOSPHAT  --   --  1073*   TBILIRUBIN  --   --  0.6     Recent Labs     05/08/21  0515 05/09/21  0518 05/10/21  0400   RBC 3.06* 3.15* 3.03*   HEMOGLOBIN 9.1* 9.2* 8.8*    HEMATOCRIT 29.1* 30.1* 29.1*   PLATELETCT [phone]       Imaging  X-Ray:  I have personally reviewed the images and compared with prior images.  CT:    Reviewed  4/23 MICAH: large vegetations in MV and TV    Microbiology:  4/16 BCx 2/2 MSSA  4/17 BCx 2/2: negative  4/20 Trach aspirate: K. pneumonia  4/24 BAL: K. Pneumonia >100k cfu/ml.   4/25 BCx 2/2: negative  4/28 Pleural tissue (intraop OR specimen): K. Pneumonia. Pansensitive  5/4 BCx 2/2: pending, negative to date    Assessment/Plan  * Bacteremia due to methicillin susceptible Staphylococcus aureus (MSSA)- (present on admission)  Assessment & Plan  Source presumed right anterior chest line/port with endocarditis  Multiple sources for MSSA including MV/TV vegetation, port, spinal stimulator/ shunt  Port has been removed  Last positive blood cultures were 4/17  Pt appeared to be on nafcillin from 4/16-4/23. Pt did receive MSSA therapy when she was in [facility].   ID following and treating MSSA bacteremia with Cefepime for CNS and lung pentretration.     Agitation requiring sedation protocol- (present on admission)  Assessment & Plan  Patient was on propofol/fentanyl for approximately 2 weeks then versed and ketamine gtt.   Off versed and ketamine gtt 5/4  Increasing oxycodone to 20 Q4H. Will start Methadone 10mg Q8 for acute pain, gabapentin to limit IV narcotics and help wean dex gtt.  Consider starting clonidine for pain -> start 5/8 blood pressure and anxiety to get off dex gtt     Has been started on methadone with prn oxy and weaning IV narcotics. Would consider transitioning off methadone prior to discharge.    Trapped lung  Assessment & Plan  Decortication 4/28  Continue chest tubes to suction and hope lungs expand  R chest has had expanding somewhat.    There is now visible respiratory movement in the R chest and some improved expansion on CXR  CT on 5/10 with continued empyema    Empyema of right pleural space (HCC)- (present on admission)  Assessment &  Plan  R empyema due to septic pulmonary emboli  4/16 Chest tube placement at [facility]  4/18 Upsized by Dr Benedict  4/18-4/20 Received TPA/dornase.  Stopped b/c Hgb dropped requiring transfusion  4/26 was discontinued due to stopped functioning   4/28 s/p right VATS and decortication. Cx grew of Klebsiella pneumonia  May need another intervention in future, per Dr. Ganser  Has two chest tubes on the right and connect to -40 per surgery    Repeat CT with continued empyema      Acute bacterial endocarditis- (present on admission)  Assessment & Plan  MSSA bacteremia at OSH and here on 4/16. First negative blood cultures were on 4/17  Infected port removed at [facility]   shunt and spinal stimulator could be seeded, NSGY has consulted on both and don't recommend removal at admission due to HD instability.   MV and TV vegetations with septic pulmonary emboli  Nataly ID Dr Omer/Beverley following.   K. Pneumonia empyema - on cefepime  Deemed not surgical candidate - No evidence based surgical indications (valvular CHF, difficult pathogen, etc) so no cardiovascular surgery consult is necessary at this time  On cefepime for K. pneumonia  Received nafcilin from 4/16-4/22.   ID and thoracic surgery following  Follow up CT noted    Acute respiratory failure with hypoxia (HCC)- (present on admission)  Assessment & Plan  4/15 Intubated for acute hypoxic respiratory failure at [facility] due to hydro/pneumo, trapped lung, lung abscesses  4/16 transferred to [facility]   4/16-4/28 Difficult to wean due to ongoing right loculated hydropneumothorax and lung abscesses, empyema. S/p 2 chest tubes  4/28 S/p right lung decortication by Dr. Ganser  5/1 s/p percutaneous tracheostomy    Concern of difficult to wean off sedation given prolonged sedation with prolonged intubation.   Pt was on versed gtt, scheduled oxycodone and dilaudid IV prn  4/30 ketamine gtt was started and planning to bridge and treat pain while weaning off versed    Daily trach  collar during day, can rest on PS at night 8/8  Continue diuresis with lasix 20 Q12H. Goal even to minimally negative fluid balance.   Tolerating tube feed at goal  GI was consulted, Dr. Moseley for possible PEG placement. Given pt is appearing to slowly recover, would like to give a chance for patient to recover before deciding for PEG.   Increase mobility    Acute encephalopathy- (present on admission)  Assessment & Plan  Likely multifactorial given CSF pleocytosis concerning of likely seeded spinal stimulator/ shunt, sedation  Pt has hx of  shunt for pseudotumor cerebri  Concern of autoimmune cerebritis was entertained but this was not confirmed.   Not candidate for MRI d/t spinal stimulator  EEG with encephalopathy and no seizures  Minimize sedation as able  Daily SAT  Improved  Weaning sedating meds    Pleural effusion, left  Assessment & Plan  Loculated fluid  Probably parapneumonic fluid from abscesses  CT guided chest tube placed 4/23, fluid sent  Lymph predominant, NGTD  TPA/dornase one dose on 4/24.  Hold d/t dropping Hgb  CT chest 4/25 with resolution of fluid  CT clamped 4/29-no significant accumulation of fluid so d/c 4/30    Pneumonia  Assessment & Plan  MSSA septic emboli with empyema   VAE  BAL 4/20 klebsiella pneumonia  BAL 4/24 still heavy growth of klebsiella  4/28 OR pleural fluid specimen growing klebsiella.   On cefepime    CSF pleocytosis- (present on admission)  Assessment & Plan  Status post lumbar puncture 4/13  WBC 53, 82% polys, Glucose 47, protein 97 Gram stain negative and culture negative at [facility]  Neurosurgery aware,  shunt in place  MRI brain requested by neurosurgery at [facility], not candidate d/t stimulator  Repeat ct no embolic lesions      Acute blood loss anemia  Assessment & Plan  Resolved  Hgb stable in 7-8s      S/P  shunt- (present on admission)  Assessment & Plan  History of pseudotumor cerebri  History of  shunt by Dr. Oh  Could be seeded by MSSA, Dr. Oh  consulted at [facility], recommended MRI but unable given her spinal stimulator  Need to revisit this when patient is more stable     Elevated alkaline phosphatase level- (present on admission)  Assessment & Plan  Worsening  With dilated biliary ducts  RUQ US ordered, may need MRCP    Goals of care, counseling/discussion  Assessment & Plan  Palliative is consulting  Dr. Sun spoke with Benoit on 4/27. I explained my concern that even if Enedelia were to survive this her QOL would likely be quite a bit worse.  She won't be able to have another port and was receiving IV meds for HA 3-4 times weekly.  She also could have worse headaches after this episode of meningitis.  He asked if she could have brain damage, and I explained that was possible but hard to know right now.  He says part of him thinks Enedelia would want to keep fighting and part of him thinks she would say to stop aggressive care because her QOL was already very limited by her headaches. Continue aggressive care for now.  Dr Upton 5/4 spoke with  who's at bedside and updated him about patient's critical condition.     Spinal cord stimulator status  Assessment & Plan  Patient's stimulator is [employer]ve[employer]ra. It is FDA approved as MRI compatible, but the company has gone out of business, so there is no support team to assist with MRI.  Thus, if MRI were performed, our radiologists would need to protocol it correctly to manage the stimulator. The former rep from [employer] is Andre, [phone].  Information obtained from Dr. Mahoney's office, [phone].    Per  (5/4/2021), it is not MRI compatible unfortunately.     Anasarca  Assessment & Plan  Continue diuresis    Pulmonary abscess (HCC)  Assessment & Plan  Septic emboli from TV endocarditis    Septic shock (HCC)- (present on admission)  Assessment & Plan  Shock resolved    Abnormal LFTs  Assessment & Plan  Chronic alk phos elevation  GGT high c/w liver source  Outpatient workup    Chronic pain syndrome-  (present on admission)  Assessment & Plan  Chronic back pain and intractable headaches  History of nerve stimulator   Dr Vargas consulted at admission, no plan to remove stimulator due to her clinical instability  Multimodal with oxycodone, gabapentin, methadone, clonidine    Anxiety- (present on admission)  Assessment & Plan  Depakote, paxil, cymbalta, gabapentin, clonidine     VTE:  lovenox  Ulcer: H2 Antagonist  Lines: None Two chest tubes on the right, PICC line    I have performed a physical exam and reviewed and updated ROS and Plan today (5/10/2021). In review of yesterday's note (5/9/2021), there are no changes except as documented above.     Titrating analgesics and vent. This patient is critically ill, at high risk for decompensation leading to worsening vital organ dysfunction and death without critical care interventions.    Discussed patient condition and risk of morbidity and/or mortality with RN, RT, Pharmacy, Dietary, Code status disscussed, Charge nurse / hot rounds, Patient and infectious disease.      The patient remains critically ill requiring mechanical ventilation, difficulty weaning from vent.  Critical care time = 33 minutes in directly providing and coordinating critical care and extensive data review.  No time overlap and excludes procedures

## 2021-05-11 ENCOUNTER — APPOINTMENT (OUTPATIENT)
Dept: RADIOLOGY | Facility: MEDICAL CENTER | Age: 51
DRG: 003 | End: 2021-05-11
Attending: INTERNAL MEDICINE
Payer: MEDICARE

## 2021-05-11 PROBLEM — R74.8 ELEVATED ALKALINE PHOSPHATASE LEVEL: Status: ACTIVE | Noted: 2021-05-11

## 2021-05-11 LAB
ANION GAP SERPL CALC-SCNC: 6 MMOL/L (ref 7–16)
BASOPHILS # BLD AUTO: 0.6 % (ref 0–1.8)
BASOPHILS # BLD: 0.05 K/UL (ref 0–0.12)
BUN SERPL-MCNC: 15 MG/DL (ref 8–22)
CALCIUM SERPL-MCNC: 9 MG/DL (ref 8.5–10.5)
CHLORIDE SERPL-SCNC: 102 MMOL/L (ref 96–112)
CO2 SERPL-SCNC: 28 MMOL/L (ref 20–33)
CREAT SERPL-MCNC: <0.17 MG/DL (ref 0.5–1.4)
EKG IMPRESSION: NORMAL
EOSINOPHIL # BLD AUTO: 0 K/UL (ref 0–0.51)
EOSINOPHIL NFR BLD: 0 % (ref 0–6.9)
ERYTHROCYTE [DISTWIDTH] IN BLOOD BY AUTOMATED COUNT: 62.4 FL (ref 35.9–50)
GLUCOSE SERPL-MCNC: 109 MG/DL (ref 65–99)
HCT VFR BLD AUTO: 28.6 % (ref 37–47)
HGB BLD-MCNC: 8.8 G/DL (ref 12–16)
IMM GRANULOCYTES # BLD AUTO: 0.1 K/UL (ref 0–0.11)
IMM GRANULOCYTES NFR BLD AUTO: 1.1 % (ref 0–0.9)
LYMPHOCYTES # BLD AUTO: 1.31 K/UL (ref 1–4.8)
LYMPHOCYTES NFR BLD: 14.8 % (ref 22–41)
MAGNESIUM SERPL-MCNC: 1.8 MG/DL (ref 1.5–2.5)
MCH RBC QN AUTO: 29.6 PG (ref 27–33)
MCHC RBC AUTO-ENTMCNC: 30.8 G/DL (ref 33.6–35)
MCV RBC AUTO: 96.3 FL (ref 81.4–97.8)
MONOCYTES # BLD AUTO: 0.77 K/UL (ref 0–0.85)
MONOCYTES NFR BLD AUTO: 8.7 % (ref 0–13.4)
NEUTROPHILS # BLD AUTO: 6.6 K/UL (ref 2–7.15)
NEUTROPHILS NFR BLD: 74.8 % (ref 44–72)
NRBC # BLD AUTO: 0 K/UL
NRBC BLD-RTO: 0 /100 WBC
PLATELET # BLD AUTO: 342 K/UL (ref 164–446)
PMV BLD AUTO: 9.2 FL (ref 9–12.9)
POTASSIUM SERPL-SCNC: 4 MMOL/L (ref 3.6–5.5)
RBC # BLD AUTO: 2.97 M/UL (ref 4.2–5.4)
SODIUM SERPL-SCNC: 136 MMOL/L (ref 135–145)
WBC # BLD AUTO: 8.8 K/UL (ref 4.8–10.8)

## 2021-05-11 PROCEDURE — 80048 BASIC METABOLIC PNL TOTAL CA: CPT

## 2021-05-11 PROCEDURE — 71045 X-RAY EXAM CHEST 1 VIEW: CPT

## 2021-05-11 PROCEDURE — 700111 HCHG RX REV CODE 636 W/ 250 OVERRIDE (IP): Performed by: INTERNAL MEDICINE

## 2021-05-11 PROCEDURE — 700102 HCHG RX REV CODE 250 W/ 637 OVERRIDE(OP): Performed by: INTERNAL MEDICINE

## 2021-05-11 PROCEDURE — 97535 SELF CARE MNGMENT TRAINING: CPT

## 2021-05-11 PROCEDURE — 92522 EVALUATE SPEECH PRODUCTION: CPT

## 2021-05-11 PROCEDURE — 770022 HCHG ROOM/CARE - ICU (200)

## 2021-05-11 PROCEDURE — 94799 UNLISTED PULMONARY SVC/PX: CPT

## 2021-05-11 PROCEDURE — A9270 NON-COVERED ITEM OR SERVICE: HCPCS | Performed by: INTERNAL MEDICINE

## 2021-05-11 PROCEDURE — L8501 TRACHEOSTOMY SPEAKING VALVE: HCPCS

## 2021-05-11 PROCEDURE — 83735 ASSAY OF MAGNESIUM: CPT

## 2021-05-11 PROCEDURE — 97530 THERAPEUTIC ACTIVITIES: CPT

## 2021-05-11 PROCEDURE — 305246 HCHG SPEAKING VALVE ADAPTER

## 2021-05-11 PROCEDURE — 76705 ECHO EXAM OF ABDOMEN: CPT

## 2021-05-11 PROCEDURE — 85025 COMPLETE CBC W/AUTO DIFF WBC: CPT

## 2021-05-11 PROCEDURE — 700105 HCHG RX REV CODE 258: Performed by: INTERNAL MEDICINE

## 2021-05-11 PROCEDURE — 94640 AIRWAY INHALATION TREATMENT: CPT

## 2021-05-11 PROCEDURE — 93010 ELECTROCARDIOGRAM REPORT: CPT | Performed by: INTERNAL MEDICINE

## 2021-05-11 PROCEDURE — 97110 THERAPEUTIC EXERCISES: CPT

## 2021-05-11 PROCEDURE — 94003 VENT MGMT INPAT SUBQ DAY: CPT

## 2021-05-11 PROCEDURE — 93005 ELECTROCARDIOGRAM TRACING: CPT | Performed by: INTERNAL MEDICINE

## 2021-05-11 PROCEDURE — 99291 CRITICAL CARE FIRST HOUR: CPT | Performed by: INTERNAL MEDICINE

## 2021-05-11 PROCEDURE — 97763 ORTHC/PROSTC MGMT SBSQ ENC: CPT

## 2021-05-11 RX ORDER — METHADONE HYDROCHLORIDE 10 MG/ML
10 CONCENTRATE ORAL EVERY 8 HOURS
Status: DISCONTINUED | OUTPATIENT
Start: 2021-05-11 | End: 2021-05-17

## 2021-05-11 RX ORDER — MAGNESIUM SULFATE HEPTAHYDRATE 40 MG/ML
2 INJECTION, SOLUTION INTRAVENOUS ONCE
Status: COMPLETED | OUTPATIENT
Start: 2021-05-11 | End: 2021-05-11

## 2021-05-11 RX ORDER — KETOROLAC TROMETHAMINE 30 MG/ML
15 INJECTION, SOLUTION INTRAMUSCULAR; INTRAVENOUS EVERY 6 HOURS PRN
Status: DISCONTINUED | OUTPATIENT
Start: 2021-05-11 | End: 2021-05-12

## 2021-05-11 RX ORDER — SODIUM CHLORIDE, SODIUM LACTATE, POTASSIUM CHLORIDE, AND CALCIUM CHLORIDE .6; .31; .03; .02 G/100ML; G/100ML; G/100ML; G/100ML
500 INJECTION, SOLUTION INTRAVENOUS ONCE
Status: DISCONTINUED | OUTPATIENT
Start: 2021-05-11 | End: 2021-05-11 | Stop reason: CLARIF

## 2021-05-11 RX ORDER — HYDROMORPHONE HYDROCHLORIDE 1 MG/ML
1-2 INJECTION, SOLUTION INTRAMUSCULAR; INTRAVENOUS; SUBCUTANEOUS EVERY 8 HOURS PRN
Status: DISCONTINUED | OUTPATIENT
Start: 2021-05-11 | End: 2021-05-12

## 2021-05-11 RX ADMIN — HYDROMORPHONE HYDROCHLORIDE 2 MG: 1 INJECTION, SOLUTION INTRAMUSCULAR; INTRAVENOUS; SUBCUTANEOUS at 00:25

## 2021-05-11 RX ADMIN — GABAPENTIN 300 MG: 300 CAPSULE ORAL at 22:00

## 2021-05-11 RX ADMIN — MAGNESIUM SULFATE 2 G: 2 INJECTION INTRAVENOUS at 07:47

## 2021-05-11 RX ADMIN — CLONIDINE HYDROCHLORIDE 0.1 MG: 0.1 TABLET ORAL at 21:59

## 2021-05-11 RX ADMIN — DOCUSATE SODIUM 50 MG AND SENNOSIDES 8.6 MG 2 TABLET: 8.6; 5 TABLET, FILM COATED ORAL at 05:25

## 2021-05-11 RX ADMIN — FAMOTIDINE 20 MG: 20 TABLET ORAL at 17:22

## 2021-05-11 RX ADMIN — OXYCODONE HYDROCHLORIDE 20 MG: 10 TABLET ORAL at 01:22

## 2021-05-11 RX ADMIN — OXYCODONE HYDROCHLORIDE 20 MG: 10 TABLET ORAL at 10:33

## 2021-05-11 RX ADMIN — DIVALPROEX SODIUM 500 MG: 125 CAPSULE ORAL at 21:59

## 2021-05-11 RX ADMIN — CEFEPIME 2 G: 2 INJECTION, POWDER, FOR SOLUTION INTRAVENOUS at 01:19

## 2021-05-11 RX ADMIN — CEFEPIME 2 G: 2 INJECTION, POWDER, FOR SOLUTION INTRAVENOUS at 17:22

## 2021-05-11 RX ADMIN — GABAPENTIN 300 MG: 300 CAPSULE ORAL at 05:25

## 2021-05-11 RX ADMIN — METHADONE HYDROCHLORIDE 10 MG: 10 CONCENTRATE ORAL at 13:41

## 2021-05-11 RX ADMIN — CLONIDINE HYDROCHLORIDE 0.1 MG: 0.1 TABLET ORAL at 13:40

## 2021-05-11 RX ADMIN — DIVALPROEX SODIUM 500 MG: 125 CAPSULE ORAL at 05:25

## 2021-05-11 RX ADMIN — POLYETHYLENE GLYCOL 3350 1 PACKET: 17 POWDER, FOR SOLUTION ORAL at 05:25

## 2021-05-11 RX ADMIN — CEFEPIME 2 G: 2 INJECTION, POWDER, FOR SOLUTION INTRAVENOUS at 10:33

## 2021-05-11 RX ADMIN — METHADONE HYDROCHLORIDE 10 MG: 10 CONCENTRATE ORAL at 21:59

## 2021-05-11 RX ADMIN — FAMOTIDINE 20 MG: 20 TABLET ORAL at 05:26

## 2021-05-11 RX ADMIN — DULOXETINE HYDROCHLORIDE 60 MG: 60 CAPSULE, DELAYED RELEASE ORAL at 17:22

## 2021-05-11 RX ADMIN — RISPERIDONE 2 MG: 1 TABLET, FILM COATED ORAL at 05:26

## 2021-05-11 RX ADMIN — METHADONE HYDROCHLORIDE 5 MG: 10 CONCENTRATE ORAL at 05:25

## 2021-05-11 RX ADMIN — DOCUSATE SODIUM 50 MG AND SENNOSIDES 8.6 MG 2 TABLET: 8.6; 5 TABLET, FILM COATED ORAL at 17:22

## 2021-05-11 RX ADMIN — DIVALPROEX SODIUM 500 MG: 125 CAPSULE ORAL at 13:41

## 2021-05-11 RX ADMIN — OXYCODONE HYDROCHLORIDE 20 MG: 10 TABLET ORAL at 13:40

## 2021-05-11 RX ADMIN — PAROXETINE HYDROCHLORIDE 10 MG: 20 TABLET, FILM COATED ORAL at 05:26

## 2021-05-11 RX ADMIN — OXYCODONE HYDROCHLORIDE 20 MG: 10 TABLET ORAL at 05:26

## 2021-05-11 RX ADMIN — GABAPENTIN 300 MG: 300 CAPSULE ORAL at 13:41

## 2021-05-11 RX ADMIN — CLONIDINE HYDROCHLORIDE 0.1 MG: 0.1 TABLET ORAL at 05:26

## 2021-05-11 RX ADMIN — OXYCODONE HYDROCHLORIDE 20 MG: 10 TABLET ORAL at 22:00

## 2021-05-11 RX ADMIN — DULOXETINE HYDROCHLORIDE 60 MG: 60 CAPSULE, DELAYED RELEASE ORAL at 05:25

## 2021-05-11 RX ADMIN — RISPERIDONE 2 MG: 1 TABLET, FILM COATED ORAL at 17:22

## 2021-05-11 RX ADMIN — TRAMADOL HYDROCHLORIDE 50 MG: 50 TABLET ORAL at 03:20

## 2021-05-11 RX ADMIN — OXYCODONE HYDROCHLORIDE 20 MG: 10 TABLET ORAL at 17:22

## 2021-05-11 RX ADMIN — ENOXAPARIN SODIUM 40 MG: 40 INJECTION SUBCUTANEOUS at 05:25

## 2021-05-11 ASSESSMENT — COGNITIVE AND FUNCTIONAL STATUS - GENERAL
STANDING UP FROM CHAIR USING ARMS: A LITTLE
MOVING FROM LYING ON BACK TO SITTING ON SIDE OF FLAT BED: A LITTLE
SUGGESTED CMS G CODE MODIFIER MOBILITY: CK
SUGGESTED CMS G CODE MODIFIER DAILY ACTIVITY: CK
MOVING TO AND FROM BED TO CHAIR: A LITTLE
WALKING IN HOSPITAL ROOM: A LITTLE
DAILY ACTIVITIY SCORE: 14
EATING MEALS: TOTAL
PERSONAL GROOMING: A LITTLE
MOBILITY SCORE: 17
TOILETING: A LITTLE
DRESSING REGULAR UPPER BODY CLOTHING: A LOT
TURNING FROM BACK TO SIDE WHILE IN FLAT BAD: A LITTLE
HELP NEEDED FOR BATHING: A LOT
DRESSING REGULAR LOWER BODY CLOTHING: A LITTLE
CLIMB 3 TO 5 STEPS WITH RAILING: A LOT

## 2021-05-11 ASSESSMENT — PAIN DESCRIPTION - PAIN TYPE
TYPE: ACUTE PAIN;CHRONIC PAIN
TYPE: ACUTE PAIN
TYPE: ACUTE PAIN;CHRONIC PAIN

## 2021-05-11 ASSESSMENT — ENCOUNTER SYMPTOMS
FEVER: 0
ABDOMINAL PAIN: 0

## 2021-05-11 ASSESSMENT — GAIT ASSESSMENTS
GAIT LEVEL OF ASSIST: MINIMAL ASSIST
DISTANCE (FEET): 60
ASSISTIVE DEVICE: FRONT WHEEL WALKER

## 2021-05-11 NOTE — PROGRESS NOTES
CT reviewed  Still extensive trapped lung  Will need thoracotomy and decortication to free lung  Will see when it can be done

## 2021-05-11 NOTE — PROGRESS NOTES
"Critical Care Progress Note    Date of admission  4/16/2021    Chief Complaint  Endocarditis    Hospital Course  Chief Complaint  \"50 y.o. female the past medical history of pseudotumor cerebri status post  shunt by Dr. Oh, chronic pain with history of placement of nerve stimulator, vasculitis, right anterior chest port for home IV meds with recurrent infection who presented 4/11/2021 with to [facility] with recurrent port infection. Found to have MSSA bacteremia, endocarditis, septic pulmonary emboli/empyema/pneumatocele, and CSF pleocytosis concerning for  shunt involvement.  Seen by Dr. Oh, NSGY, at [facility] who did not recommend  shunt removal.  Seen by Dr. Vargas here for concern of fluid collection around her spinal stimulator and he recommended against removal. Remains intubated, encephalopathic.\"     Hospital Course  4/16 admitted to ICU  4/17 VD 2, 2 FFP, pRBC overnight; new chest tube per gen surg  4/18 VD 3, TPa/Dornase with 1400 cc from R chest tube  4/26: R chest tube not functioning, d/c it.  broke his leg and going to OR today, so not available currently.  VD 11.  8/30%. RSBI immediately high on SBT attempt. Followed commands for RN  4/27: VD12. 8/30%. Not tolerating wean, high RSBI and tachycardic.   4/28: VD 13. VATS with Dr. Ganser.    4/29: VD 14. 8/30%. Still not tolerating wean d/t severe tachycardia just with SAT. Trialed precedex and she required fentanyl up to 600/hr to tolerate, so back on propofol. Chest tubes -40  4/30: VD 15. 8/30%. Try ketamine/versed as sedative. Severe tachycardia and HTN with weaning down propofol. Plan for perc trach tomorrow. Chest tubes -40  5/1: perc trach. PICC placed. Chest tubes remain -40.  5/2: Able to spont well on sedation, but very tachy/HTN when sedation is turned down.  5/3 Versed weaned down to 3mg/min.   5/4 off versed and ketamine gtt  5/5 trials of SBT w/ trach  5/6 T piece trial during day, mobilize to chair x2, rest vent 8/8 at night, " started methadone, gabapentin   5/7 T piece during day with, rest PS 8/8 overnight, spacing out dilaudid prn's  5/8 T piece during day, still with anxiety weaning dex gtt  5/11      Interval Problem Update   - requesting frequent pain medications   - sinus tach, SBP    - requesting dilaudid ATC and hyperfixated on it   - EKG with nl QTC --> increased methadone   - CT in place, # 1 0mL output, # 2 8 mL output   - CT C/A/P showing slight improvement   - on T-piece this morning   - AAOx4   - AF, nl WBC   - LTAC consult   - jose Tfs at goal, last BM 5/7   - PICC   - low Mag   - TD # 10, attempting PMV   - 10 hrs T-piece yesterday   - cefepime    Review of Systems  Review of Systems   Unable to perform ROS: Intubated        Vital Signs for last 24 hours   Temp:  [36 °C (96.8 °F)-36.8 °C (98.3 °F)] 36.1 °C (97 °F)  Pulse:  [] 94  Resp:  [11-28] 16  BP: ()/(43-76) 103/52  SpO2:  [83 %-99 %] 98 %    Respiratory Information for the last 24 hours  Vent Mode: Spont  PEEP/CPAP: 8  P Support: 6  MAP: 9.4  Length of Weaning Trial (Hours): on going --> TP 7 lpm, 35%    Physical Exam   Physical Exam  Vitals and nursing note reviewed.   Constitutional:       General: She is awake. She is not in acute distress.     Appearance: She is normal weight. She is ill-appearing.      Interventions: She is intubated.      Comments: Sitting up in chair, sleeping but easily arousable and immediately asks for Dilaudid   HENT:      Head: Normocephalic.      Nose: Nose normal. No congestion.      Comments: Nasal feeding tube in place     Mouth/Throat:      Mouth: Mucous membranes are moist.      Pharynx: Oropharynx is clear.   Eyes:      General: No scleral icterus.     Conjunctiva/sclera: Conjunctivae normal.      Pupils: Pupils are equal, round, and reactive to light.   Neck:      Comments: Tracheostomy tube in place without surrounding erythema  Cardiovascular:      Rate and Rhythm: Regular rhythm. Tachycardia present.       Pulses: Normal pulses.      Heart sounds: Murmur present.   Pulmonary:      Effort: No tachypnea or accessory muscle usage. She is intubated.      Breath sounds: Examination of the right-middle field reveals decreased breath sounds and rhonchi. Examination of the left-middle field reveals rhonchi. Examination of the right-lower field reveals decreased breath sounds and rhonchi. Examination of the left-lower field reveals decreased breath sounds and rhonchi. Decreased breath sounds and rhonchi present. No wheezing.      Comments: Chest tubes in place x2 with minimal bloody output, no air leak, minimal secretions including with tracheal suctioning  Abdominal:      General: Bowel sounds are normal. There is no distension.      Palpations: Abdomen is soft.      Tenderness: There is no abdominal tenderness. There is no guarding.   Genitourinary:     Comments: Lopez catheter in place  Musculoskeletal:         General: No tenderness.      Cervical back: Neck supple. No rigidity.      Right lower leg: No edema.      Left lower leg: No edema.   Skin:     General: Skin is warm and dry.      Capillary Refill: Capillary refill takes less than 2 seconds.      Coloration: Skin is not pale.   Neurological:      General: No focal deficit present.      Mental Status: She is alert and oriented to person, place, and time.      Cranial Nerves: No cranial nerve deficit.   Psychiatric:         Mood and Affect: Mood normal.         Behavior: Behavior is cooperative.      Comments: Perseverates on pain medicine and needing tracheal suctioning         Medications  Current Facility-Administered Medications   Medication Dose Route Frequency Provider Last Rate Last Admin   • gabapentin (NEURONTIN) capsule 300 mg  300 mg Enteral Tube Q8HRS Juventino Lozano M.D.   300 mg at 05/11/21 0525   • cloNIDine (CATAPRES) tablet 0.1 mg  0.1 mg Enteral Tube Q8HRS Juventino Lozano M.D.   0.1 mg at 05/11/21 0526   • HYDROmorphone (Dilaudid) injection 1-2  mg  1-2 mg Intravenous Q4HRS PRN Juventino Lozano M.D.   2 mg at 05/11/21 0025   • methadone (DOLOPHINE) 10 MG/ML solution 5 mg  5 mg Enteral Tube Q8HRS Juventino Lozano M.D.   5 mg at 05/11/21 0525   • oxyCODONE immediate release (ROXICODONE) tablet 20 mg  20 mg Enteral Tube Q4HRS Jonathan Upton D.O.   20 mg at 05/11/21 0526   • traMADol (ULTRAM) 50 MG tablet 50 mg  50 mg Enteral Tube Q6HRS PRN Jonathan Upton D.OFrederick   50 mg at 05/11/21 0320   • dexmedetomidine (PRECEDEX) 400 mcg/100mL NS premix infusion  0.1-1.5 mcg/kg/hr Intravenous Continuous LEYLA Caban.O.   Stopped at 05/08/21 1800   • cefepime (MAXIPIME) 2 g in  mL IVPB  2 g Intravenous Q8HRS Eric Lowery M.D.   Stopped at 05/11/21 0149   • labetalol (NORMODYNE/TRANDATE) injection 10 mg  10 mg Intravenous Q4HRS PRN Leti Sun M.D.   10 mg at 05/04/21 0226   • enoxaparin (LOVENOX) inj 40 mg  40 mg Subcutaneous DAILY Benoit Loya Jr. D.O.   40 mg at 05/11/21 0525   • Pharmacy Consult: Enteral tube insertion - review meds/change route/product selection  1 Each Other PHARMACY TO DOSE Elliott Salvador M.D.       • acetaminophen (Tylenol) tablet 650 mg  650 mg Enteral Tube Q4HRS PRN Elliott Salvador M.D.   650 mg at 05/06/21 0153   • magnesium hydroxide (MILK OF MAGNESIA) suspension 30 mL  30 mL Enteral Tube QDAY PRN Elliott Salvador M.D.        And   • senna-docusate (PERICOLACE or SENOKOT S) 8.6-50 MG per tablet 2 tablet  2 tablet Enteral Tube BID Elliott Salvador M.D.   2 tablet at 05/11/21 0525    And   • polyethylene glycol/lytes (MIRALAX) PACKET 1 Packet  1 Packet Enteral Tube QDAY PRN Elliott Salvador M.D.   1 Packet at 05/11/21 0525    And   • bisacodyl (DULCOLAX) suppository 10 mg  10 mg Rectal QDAY PRN Elliott Salvador M.D.       • divalproex (DEPAKOTE SPRINKLE) capsule 500 mg  500 mg Enteral Tube Q8HRS Elliott Salvador M.D.   500 mg at 05/11/21 0525   • DULoxetine (CYMBALTA) capsule 60 mg  60 mg Enteral Tube BID Elliott Salvador,  M.D.   60 mg at 05/11/21 0525   • PARoxetine (PAXIL) tablet 10 mg  10 mg Enteral Tube QAM Elliott Salvador M.D.   10 mg at 05/11/21 0526   • risperiDONE (RISPERDAL) tablet 2 mg  2 mg Enteral Tube BID Elliott Salvador M.D.   2 mg at 05/11/21 0526   • Respiratory Therapy Consult   Nebulization Continuous RT Man Wahl M.D.       • famotidine (PEPCID) tablet 20 mg  20 mg Enteral Tube Q12HRS Man Wahl M.D.   20 mg at 05/11/21 0526   • MD Alert...ICU Electrolyte Replacement per Pharmacy   Other PHARMACY TO DOSE Man Wahl M.D.       • lidocaine (XYLOCAINE) 1 % injection 1-2 mL  1-2 mL Tracheal Tube Q30 MIN PRN Man Wahl M.D.       • ipratropium-albuterol (DUONEB) nebulizer solution  3 mL Nebulization Q2HRS PRN (RT) Man Wahl M.D.           Fluids    Intake/Output Summary (Last 24 hours) at 5/11/2021 0653  Last data filed at 5/11/2021 0400  Gross per 24 hour   Intake 600 ml   Output 905 ml   Net -305 ml       Laboratory          Recent Labs     05/09/21  0518 05/10/21  0400 05/11/21  0315   SODIUM 136 137 136   POTASSIUM 3.8 3.9 4.0   CHLORIDE 99 101 102   CO2 32 28 28   BUN 14 14 15   CREATININE <0.17* <0.17* <0.17*   MAGNESIUM 1.7 1.8 1.8   CALCIUM 9.0 8.7 9.0     Recent Labs     05/09/21  0518 05/10/21  0400 05/10/21  1606 05/11/21  0315   ALTSGPT  --  28  --   --    ASTSGOT  --  17  --   --    ALKPHOSPHAT  --  1073*  --   --    TBILIRUBIN  --  0.6  --   --    PREALBUMIN  --   --  17.4*  --    GLUCOSE 95 97  --  109*     Recent Labs     05/09/21  0518 05/10/21  0400 05/11/21  0315   WBC 12.1* 10.4 8.8   NEUTSPOLYS 79.20* 77.30* 74.80*   LYMPHOCYTES 11.90* 12.70* 14.80*   MONOCYTES 6.90 8.10 8.70   EOSINOPHILS 0.10 0.10 0.00   BASOPHILS 0.40 0.50 0.60   ASTSGOT  --  17  --    ALTSGPT  --  28  --    ALKPHOSPHAT  --  1073*  --    TBILIRUBIN  --  0.6  --      Recent Labs     05/09/21  0518 05/10/21  0400 05/11/21  0315   RBC 3.15* 3.03* 2.97*   HEMOGLOBIN 9.2* 8.8* 8.8*    HEMATOCRIT 30.1* 29.1* 28.6*   PLATELETCT 382 349 342       Imaging  X-Ray:  I have personally reviewed the images and compared with prior images. and My impression is: Unchanged right-sided loculated effusion with scattered infiltrates and consolidation, tracheostomy tube/gastric tube/right PICC/right chest tubes x2 in good position  CT:    Reviewed  4/23 MICAH: large vegetations in MV and TV    Microbiology:  4/16 BCx 2/2 MSSA  4/17 BCx 2/2: negative  4/20 Trach aspirate: K. pneumonia  4/24 BAL: K. Pneumonia >100k cfu/ml.   4/25 BCx 2/2: negative  4/28 Pleural tissue (intraop OR specimen): K. Pneumonia. Pansensitive  5/4 BCx 2/2: pending, negative to date    Assessment/Plan  * Bacteremia due to methicillin susceptible Staphylococcus aureus (MSSA)- (present on admission)  Assessment & Plan  Source presumed right anterior chest line/port with endocarditis  Multiple sources for MSSA including MV/TV vegetation, port, spinal stimulator/ shunt  Port has been removed  Last positive blood cultures were 4/17  Pt appeared to be on nafcillin from 4/16-4/23. Pt did receive MSSA therapy when she was in [facility].   ID following and treating MSSA bacteremia with Cefepime for CNS and lung pentretration.     Agitation requiring sedation protocol- (present on admission)  Assessment & Plan  Increase methadone and decrease Dilaudid frequency    Empyema of right pleural space (HCC)- (present on admission)  Assessment & Plan  R empyema due to septic pulmonary emboli  4/16 Chest tube placement at [facility]  4/18 Upsized by Dr Benedict  4/18-4/20 Received TPA/dornase.  Stopped b/c Hgb dropped requiring transfusion  4/26 was discontinued due to stopped functioning   4/28 s/p right VATS and decortication. Cx grew of Klebsiella pneumonia    Discussed with Dr. Ganser, plans for decortication.  Continue chest tube drainage in the meantime    Acute bacterial endocarditis- (present on admission)  Assessment & Plan  MSSA bacteremia at OSH and here on 4/16.  First negative blood cultures were on 4/17  Infected port removed at [facility]   shunt and spinal stimulator could be seeded, NSGY has consulted on both and don't recommend removal at admission due to HD instability.   MV and TV vegetations with septic pulmonary emboli  Nataly Omer/B[facility]ley following.   K. Pneumonia empyema - on cefepime  Deemed not surgical candidate - No evidence based surgical indications (valvular CHF, difficult pathogen, etc) so no cardiovascular surgery consult is necessary at this time  On cefepime for K. pneumonia  Received nafcilin from 4/16-4/22.   ID and thoracic surgery following    Acute respiratory failure with hypoxia (HCC)- (present on admission)  Assessment & Plan  4/15 Intubated for acute hypoxic respiratory failure at [facility] due to hydro/pneumo, trapped lung, lung abscesses  4/16 transferred to [facility]   4/16-4/28 Difficult to wean due to ongoing right loculated hydropneumothorax and lung abscesses, empyema. S/p 2 chest tubes  4/28 S/p right lung decortication by Dr. Ganser  5/1 s/p percutaneous tracheostomy    Continue progressive lengthening T-piece trials  Begin capping trach with speaking valve/SLP  Titrate FiO2 to goal SPO2 greater than 90%    Acute encephalopathy- (present on admission)  Assessment & Plan  Improving  Begin to titrate down pain medicine as tolerates  Reorient and maintain sleep/wake cycle, mobilize    Pleural effusion, left  Assessment & Plan  Loculated fluid  Probably parapneumonic fluid from abscesses  CT guided chest tube placed 4/23, fluid sent  Lymph predominant, NGTD  TPA/dornase one dose on 4/24.  Hold d/t dropping Hgb  CT chest 4/25 with resolution of fluid  CT clamped 4/29-no significant accumulation of fluid so d/c 4/30    Atelectasis  Assessment & Plan  Continue positive pressure ventilation at night    Pneumonia  Assessment & Plan  MSSA septic emboli with empyema   VAE  BAL 4/20 klebsiella pneumonia  BAL 4/24 still heavy growth of  klebsiella  4/28 OR pleural fluid specimen growing klebsiella.   Continue cefepime per ID    Acute blood loss anemia  Assessment & Plan  Resolved  Hgb stable in 7-8s      S/P  shunt- (present on admission)  Assessment & Plan  History of pseudotumor cerebri  History of  shunt by Dr. Oh  Could be seeded by MSSA, Dr. Oh consulted at [facility], recommended MRI but unable given her spinal stimulator    Elevated alkaline phosphatase level- (present on admission)  Assessment & Plan  Unchanged  With dilated biliary ducts on imaging, stable  Unable to get MRCP due to nerve stimulator    Goals of care, counseling/discussion  Assessment & Plan  Palliative is consulting  Full code    Spinal cord stimulator status  Assessment & Plan  Patient's stimulator is [employer]. It is FDA approved as MRI compatible, but the company has gone out of business, so there is no support team to assist with MRI.  Thus, if MRI were performed, our radiologists would need to protocol it correctly to manage the stimulator. The former rep from [employer] is Andre, [phone].  Information obtained from Dr. Mahoney's office, [phone].    Per  (5/4/2021), it is not MRI compatible unfortunately.     Anasarca  Assessment & Plan  Continue diuresis    Pulmonary abscess (HCC)  Assessment & Plan  Septic emboli from TV endocarditis    Anemia  Assessment & Plan  Daily CBC with conservative transfusion strategy    Prolonged Q-T interval on ECG- (present on admission)  Assessment & Plan  Repeat EKG today  Optimize electrolytes, continuous telemetry monitoring    Abnormal LFTs  Assessment & Plan  Chronic alk phos elevation  GGT high c/w liver source  Outpatient workup    Chronic pain syndrome- (present on admission)  Assessment & Plan  ClosedChronic back pain and intractable headaches  History of nerve stimulator   Dr Vargas consulted at admission, no plan to remove stimulator due to her clinical instability  Multimodal with oxycodone, gabapentin,  methadone (increasing dose if QTC within normal limits), clonidine    Hypomagnesemia  Assessment & Plan  Repletion and monitor closely    Hypokalemia  Assessment & Plan  Repletion and monitor closely    Anxiety- (present on admission)  Assessment & Plan  Continue Depakote, paxil, cymbalta, gabapentin, clonidine     VTE:  lovenox  Ulcer: H2 Antagonist  Lines: Central Line  Ongoing indication addressed, Lopez Catheter  Ongoing indication addressed and None     I have performed a physical exam and reviewed and updated ROS and Plan today (5/11/2021). In review of yesterday's note (5/10/2021), there are no changes except as documented above.     Patient remains critically ill today requiring active management of her mechanical ventilatory support as well as titration of pain medicine. High risk of deterioration and worsening vital organ dysfunction and death without the above critical care interventions.    Discussed patient condition and risk of morbidity and/or mortality with RN, RT, Pharmacy, Charge nurse / hot rounds, Patient and general surgery and infectious disease.  Critical care time = 31 minutes in directly providing and coordinating critical care and extensive data review.  No time overlap and excludes procedures

## 2021-05-11 NOTE — ASSESSMENT & PLAN NOTE
Unchanged --> recheck tomorrow  With dilated biliary ducts on imaging, stable  Unable to get MRCP due to nerve stimulator, ?  Eventual ERCP

## 2021-05-11 NOTE — THERAPY
Physical Therapy   Daily Treatment     Patient Name: Enedelia Pang  Age:  50 y.o., Sex:  female  Medical Record #: [id number]  Today's Date: 5/11/2021     Precautions: Fall Risk, Tracheostomy , Nasogastric Tube    Assessment    Patient able to demonstrate progress towards her functional mobility goals evidenced by ability to initiate gait training. Patient req Brenda for transfers STS with FWW and assistance at RUE d/t weakness, and Brenda for ambulation x60'+60' with difficulty navigating hallways and during turns requiring cues to manage FWW. Patient receptive to seated and standing there-ex with emphasis on diaphragmatic breathing throughout activities and exercises. Patient required x10L .45 FIO2 on T-piece for SPO2 >95%. Patient remains limited by ICU acquired weakness, decreased pulmonary endurance, impaired balance and decreased safety awareness and will benefit from continued IP PT while in house.     Plan    Continue current treatment plan.    DC Equipment Recommendations: (P) Unable to determine at this time  Discharge Recommendations: (P) Recommend post-acute placement for additional physical therapy services prior to discharge home     Objective       05/11/21 1000   Gait Analysis   Gait Level Of Assist Minimal Assist   Assistive Device Front Wheel Walker   Distance (Feet) 60   # of Times Distance was Traveled 2   Weight Bearing Status no restrictions   Skilled Intervention Facilitation;Verbal Cuing;Tactile Cuing;Sequencing   Comments cues for navigation in hallway, veering towards R   Bed Mobility    Comments found and left sitting in chair   Functional Mobility   Sit to Stand Minimal Assist   Bed, Chair, Wheelchair Transfer Minimal Assist   Mobility w/ FWW   Skilled Intervention Verbal Cuing;Tactile Cuing;Sequencing;Facilitation   Comments difficulty lifting RUE onto FWW   Short Term Goals    Short Term Goal # 1 Pt will be able to perform supine<>sit with HOB flat/no rails with Spv in 6tx to promote fnx  progression towards  I    Goal Outcome # 1 goal not met   Short Term Goal # 2 Pt will be able to perform sit<>stand/transfers with FWW with SPv in 6tx to promote fnx progression towards I    Goal Outcome # 2 Progressing as expected   Short Term Goal # 3 Pt will be able to ambulate 150ft with FWW iwth SPv in 6tx to promote fnx progression towards I    Goal Outcome # 3 Progressing as expected   Short Term Goal # 4 Pt will be able to ambulate up/down FOS with rail wtih Spv in 6tx to promote eventual dc to home plan   Goal Outcome # 4 Goal not met   Anticipated Discharge Equipment and Recommendations   DC Equipment Recommendations Unable to determine at this time   Discharge Recommendations Recommend post-acute placement for additional physical therapy services prior to discharge home

## 2021-05-11 NOTE — PROGRESS NOTES
Infectious Disease Progress Note    Author: Eric Lowery M.D. Date & Time created: 5/10/2021  5:19 PM  Cefepime 4/23 -  current Day #15     Thoracoscopy & Decortication - 4/28     PRIOR Rx:   Zosyn 4/22-4/23  Previous: Unasyn, Zosyn, Vanco, Daptomycin  Ceftaroline: start 4/13-4/15  Nafcillin 2g Q4 hrs 4/15-4/23 4/14: Unable to give name of previous NSG or neurologist. Seems confused, but able to say some sentences fluently.   4/15: Line cultures now growing MSSA. As well as from the blood. Repeat blood culture 4/13+ in both sets. Patient has worsening confusion. CSF fluid analyses suggest pleocytosis. Cultures are no growth from the CSF. Chest CT was ordered this morning indicating a loculated right pleural effusion as well as bilateral septic emboli. Dr. Mack spoke with Dr. Oh yesterday and no surgical intervention unless clinical deterioration.  4/16: Increased respiratory distress.  Tachypneic.  CT with loculated collection.  Dr Resendiz not available.  No thoracic surgeon available.  Plans to have pulmonary place CT.  Transferring to ICU.  4/17: Transferred to [facility] last night. Hgb dropped to 6.4 requiring PRBC transfusion. Requiring 2 pressors. Fio2 50%. Febrile 38.8. Pending OR decortication of empyema and hemothorax. Chest tube placed at [facility] shows 8,371 WBC, ,000, 74% N  4/18: Chest tube was upsized by surgery yesterday, no decortication. WBC 22k. Fio2 40%. Vasopressin. Levophed down to 2mcg. Afebrile 24 hrs. Last fever 38.6 on 4/16.   4/19: Still on vent. Levo 3 mcg, vasopressin. Afebrile 72 hours. WBC 21k. BC 4/17 NGTD x 48 hours. Unable to do MRI brain given neurostimulator per RN.   4/20: Remaining afebrile. Hb 6.2 and undergoing transfusion today.WBC 24,100, 5% bands.1700 ml CT output. Copious bloody ET secretions. No pressors. Receiving dornase and TPA per CT. Right pleural effusion not large per CXR today.   4/21: WBC 31k. Bronchoscopy yesterday showing blood. Cultures sent. TPA  on hold per RN due to arvind blood from chest tube requiring PRBC transfusion for hgb 6. Pending chest CT today. Per , spinal stimulator is non-MRI compatible. Levophed 10mcg. 30% Fio2. Afebrile.   4/22: Fever 101.3 this am. WBC 20,300 down from 32,200 yesterday. BAL growing Klebsiella pneumoniae but susceptibility panel not set up until today. CTA of brain shows no lesion. CT of chest shows enlarging cavitary lung lesions bilat and large loculated new left pleural effusion and large hydropneumothorax on right. TPA & dornase infusions of right chest ended 4/20 after considerable bleeding requiring transfusion. Hb now down again to 6.8.  4/23: WBC 23k. Fio2 40%. Tmax 38.1. US of stiumlator shows small fluid collection but no drainable abscess. Pending MICAH today. Pending L chest tube placement  4/24: Continue fever 100.8-101.8. WBC 16,600. MICAH shows large vegetations on both tricuspid valve and mitral valve with mild TR & MR.  No aortic root abscess. Left chest tube placed yesterday yielding 8 ml of old bloody fluid. Bronch today revealed copious thick maroon secretions in distal trachea and both mainstem bronchi. On the right these were obstructive. Remaining off pressors. Last positive blood cultures (MSSA) were 4/16, negative 4/17.  4/25: Fever 100.6 this AM. wBC 13,300. Hb 6.6. CT: right hydropneumothorax increasing, right bronchopleural fistula, mediastinal shift ot the left , multiple cavitary pulmonary nodules, 3.1 cm left basilar mass, splenomegaly. CT of head shows dural thickening over the clivus. Lac+ GNR >100,000 col/ml growing from BAL of 4/24, presumed Klebsiella. Left peural fluid culture negative from 4/23.  4/26: Fever 100.4 last night. WBC 13,500. Hb 7.4. Plts 502,000. ESR >120. UA fairly clear except 30 mg% protein, 2-5 wbc and rare rbc. CXR unchanged except more prominent pulmonary edema. GNR in BAL may be a different species of Klebsiella, and resistant to ampicillin & tmp-sulfa;  confirmation pending.  4/28: Fever 100.8 last night. WBC 27,700. Hb 6.6. Plts 482,000. Creat 0.19. Kleb pneum in BAL on 4/24 is resistant to ampicillin & tmp-sulfa but otherwise sensitive. O2 sat 96% on FIO2 30% now, PEEP 8. Has been re-evaluated by thoracic surgeon, Dr. Ganser, who believes she will not wean without decortication on the right. Surgery anticipated today.  4/29: S/P right thoracoscopy with decortication with cultures NG at 24 hrs.  4/30: Had a bronch due to hypoxia and hypotension. CXR with worsening right hemithorax pulmonary opacities. Bloody mucous plugs removed. Pressors started. Febrile this am. Tachy in 130s. Pressors just removed. Tolerating vent, but not a SBT candidate at the moment.  5/1: Small air leak CT-2 every ~ 2/3 breathes. The K. pneumoniae from her thoracoscopy is sensitive to her cefepime so no antibiotic change needed.   5/2: No air leak today she tolerated 2  hrs of spontaneous breathing with support today.      5/3: Second day with SBT and doing well now for 2 hrs. Slight bump in temperature and      WBC's but no obvious clinical infectious changed so watch closely.      5/4: Fever still from time to time. WBCs trending up. Chest tubes in place. Trach.       5/5: She clearly is better today she was sitting up in bed with open eyes and follows commands. She still has low grade fever but her WBC's are dropping. Cefepime dose increased yesterday for increased CNS coverage if necessary. She will probably need further thoracic surgery as mentioned by Dr. Ganser. The issue of what to do with her stimulator is still up in the air for now as it probably will need to be removed but she is nort a candidate for more major surgery at this time.       5/7: She continues to improve and under go longer SBT trials. She just had a new PICC placed in her right arm and plan is to D/C central line.        5/8: PICC placed. No fevers. Comfortable. Cortrak placed. Failing SBTs.  5/9: No acute issues.  No fevers. Comfortable. Family at bedside.   5/10: No acute issues, fevers or WBC bumps. No changes in condition. She is to get her CT scan today and be reviewed by surgery    Interval History:  Past 24 hrs reviewed with RN.    Labs Reviewed, Medications Reviewed, Radiology Reviewed, Wound Reviewed, Fluids Reviewed and GI Nutrition Reviewed    Review of Systems:  Review of Systems   Constitutional: Negative for fever.   Cardiovascular: Negative for chest pain.   Gastrointestinal: Negative for abdominal pain.       Physical Exam:  Physical Exam  Constitutional:       Appearance: Normal appearance.   HENT:      Nose:      Comments: NG  Neck:      Comments: ET  Cardiovascular:      Rate and Rhythm: Normal rate and regular rhythm.   Pulmonary:      Breath sounds: Rhonchi present.   Abdominal:      General: Abdomen is flat.   Skin:     General: Skin is warm and dry.   Neurological:      General: No focal deficit present.      Mental Status: She is alert.         Labs:  Recent Results (from the past 24 hour(s))   CBC with Differential    Collection Time: 05/10/21  4:00 AM   Result Value Ref Range    WBC 10.4 4.8 - 10.8 K/uL    RBC 3.03 (L) 4.20 - 5.40 M/uL    Hemoglobin 8.8 (L) 12.0 - 16.0 g/dL    Hematocrit 29.1 (L) 37.0 - 47.0 %    MCV 96.0 81.4 - 97.8 fL    MCH 29.0 27.0 - 33.0 pg    MCHC 30.2 (L) 33.6 - 35.0 g/dL    RDW 61.5 (H) 35.9 - 50.0 fL    Platelet Count 349 164 - 446 K/uL    MPV 9.2 9.0 - 12.9 fL    Neutrophils-Polys 77.30 (H) 44.00 - 72.00 %    Lymphocytes 12.70 (L) 22.00 - 41.00 %    Monocytes 8.10 0.00 - 13.40 %    Eosinophils 0.10 0.00 - 6.90 %    Basophils 0.50 0.00 - 1.80 %    Immature Granulocytes 1.30 (H) 0.00 - 0.90 %    Nucleated RBC 0.00 /100 WBC    Neutrophils (Absolute) 8.05 (H) 2.00 - 7.15 K/uL    Lymphs (Absolute) 1.32 1.00 - 4.80 K/uL    Monos (Absolute) 0.84 0.00 - 0.85 K/uL    Eos (Absolute) 0.01 0.00 - 0.51 K/uL    Baso (Absolute) 0.05 0.00 - 0.12 K/uL    Immature Granulocytes (abs) 0.13 (H)  "0.00 - 0.11 K/uL    NRBC (Absolute) 0.00 K/uL   Magnesium    Collection Time: 05/10/21  4:00 AM   Result Value Ref Range    Magnesium 1.8 1.5 - 2.5 mg/dL   Comp Metabolic Panel    Collection Time: 05/10/21  4:00 AM   Result Value Ref Range    Sodium 137 135 - 145 mmol/L    Potassium 3.9 3.6 - 5.5 mmol/L    Chloride 101 96 - 112 mmol/L    Co2 28 20 - 33 mmol/L    Anion Gap 8.0 7.0 - 16.0    Glucose 97 65 - 99 mg/dL    Bun 14 8 - 22 mg/dL    Creatinine <0.17 (L) 0.50 - 1.40 mg/dL    Calcium 8.7 8.5 - 10.5 mg/dL    AST(SGOT) 17 12 - 45 U/L    ALT(SGPT) 28 2 - 50 U/L    Alkaline Phosphatase 1073 (H) 30 - 99 U/L    Total Bilirubin 0.6 0.1 - 1.5 mg/dL    Albumin 2.8 (L) 3.2 - 4.9 g/dL    Total Protein 7.0 6.0 - 8.2 g/dL    Globulin 4.2 (H) 1.9 - 3.5 g/dL    A-G Ratio 0.7 g/dL   Sed Rate    Collection Time: 05/10/21  4:00 AM   Result Value Ref Range    Sed Rate Westergren 130 (H) 0 - 25 mm/hour   ESTIMATED GFR    Collection Time: 05/10/21  4:00 AM   Result Value Ref Range    GFR If African American >60 >60 mL/min/1.73 m 2    GFR If Non African American >60 >60 mL/min/1.73 m 2   CRP Quantitive (Non-Cardiac)    Collection Time: 05/10/21  4:06 PM   Result Value Ref Range    Stat C-Reactive Protein 10.30 (H) 0.00 - 0.75 mg/dL   Prealbumin    Collection Time: 05/10/21  4:06 PM   Result Value Ref Range    Pre-Albumin 17.4 (L) 18.0 - 38.0 mg/dL     Results     Procedure Component Value Units Date/Time    BLOOD CULTURE [[id number]] Collected: 05/04/21 1200    Order Status: Completed Specimen: Blood from Peripheral Updated: 05/09/21 1300     Significant Indicator NEG     Source BLD     Site PERIPHERAL     Culture Result No growth after 5 days of incubation.    Narrative:      Collected By:[id number] FERNANDA WISE  Per Hospital Policy: Only change Specimen Src: to \"Line\" if  specified by physician order.  Left AC    BLOOD CULTURE [[id number]] Collected: 05/04/21 1150    Order Status: Completed Specimen: Blood from Peripheral " "Updated: 21 1300     Significant Indicator NEG     Source BLD     Site PERIPHERAL     Culture Result No growth after 5 days of incubation.    Narrative:      Collected By:[id number] FERNANDA WISE  Per Hospital Policy: Only change Specimen Src: to \"Line\" if  specified by physician order.  Right Hand        Hemodynamics:  Temp (24hrs), Av.3 °C (97.4 °F), Min:36 °C (96.8 °F), Max:36.8 °C (98.3 °F)  Temperature: 36.1 °C (97 °F)  Pulse  Av.4  Min: 40  Max: 149   Blood Pressure: 105/56    PICC Double Lumen 21 Lumen 1: Purple Lumen 2: Red Right Brachial (Active)   Site Assessment Clean;Dry;Intact 05/10/21 1600   Lumen 1 Status Blood return noted;Flushed;Normal saline locked;Scrubbed the hub prior to access 05/10/21 1600   Lumen 2 Status Blood return noted;Flushed;Normal saline locked;Scrubbed the hub prior to access 05/10/21 1600   Line Secured at (cm) 1 cm 21 111   Extremity Circumference (cm) 30.5 cm 21 1118   Dressing Type Biopatch;Securing device;Skin barrier;Transparent 05/10/21 1600   Dressing Status Clean;Dry;Intact 05/10/21 1600   Dressing Intervention N/A 05/10/21 0800   Dressing Change Due 05/08/21 05/10/21 1600   Date Primary Tubing Changed 05/08/21 05/10/21 0800   Date Secondary Tubing Changed 05/08/21 05/10/21 0800   Date IV Connector(s) Changed 21 111   NEXT IV Connector(s) Change 21 1118   Line Necessity Assessed Antibiotic Therapy Greater than 7 Days 05/10/21 0800   $ Double Lumen PICC Charge Single kit used 21 111     Wound:  @WOUNDLDA(4)@     Fluids:  Intake/Output       21 07 - 21 0659 21 0700 - 05/10/21 0659 05/10/21 07 - 21 0659      [id number] 1343-6237 Total 7249-0392 Total  Total       Intake    P.O.  --  -- --  --  -- --  0  -- 0    P.O. -- -- -- -- -- -- 0 -- 0    I.V.  1637.4  0 1637.4  --  -- --  --  -- --    Albumin Volume 1510 -- 1510 -- -- -- -- -- --    " Magnesium Sulfate Volume 50 -- 50 -- -- -- -- -- --    Precedex Volume 77.4 0 77.4 -- -- -- -- -- --    NG/GT  540  540 1080  540  540 1080  0  -- 0    Intake (mL) (Enteral Tube 05/07/21 10 Fr. Right nare)   0 -- 0    IV Piggyback  300  -- 300  --  -- --  --  -- --    Volume (mL) (cefepime (MAXIPIME) 2 g in  mL IVPB) 300 -- 300 -- -- -- -- -- --    Enteral  --  60 60  90  -- 90  --  -- --    Free Water / Tube Flush -- 60 60 90 -- 90 -- -- --    Total Intake 2477.4 600 3077.4  0 -- 0       Output    Urine  200  500 700  600  -- 600  400  -- 400    Number of Times Voided 3 x 2 x 5 x -- -- -- 4 x -- 4 x    Number of Times Incontinent of Urine -- -- -- -- -- -- 3 x -- 3 x    Urine Void (mL) 200 500 700 600 -- 600 0 -- 0    Output (mL) (External Urinary Catheter (Female Wick)) -- -- -- -- -- -- 400 -- 400    Drains  --  -- --  --  -- --  0  -- 0    Residual Amount (ml) (Discarded) -- -- -- -- -- -- 0 -- 0    Residual Amount (mL) (Discarded) (Enteral Tube 05/07/21 10 Fr. Right nare) -- -- -- -- -- -- 0 -- 0    Stool  --  -- --  --  -- --  0  -- 0    Number of Times Stooled -- -- -- -- -- -- 0 x -- 0 x    Measurable Stool (mL) -- -- -- -- -- -- 0 -- 0    Emesis/NG output  --  -- --  --  -- --  0  -- 0    Output (mL) (Enteral Tube 05/07/21 10 Fr. Right nare) -- -- -- -- -- -- 0 -- 0    Chest Tube  20  20 40  30  20 50  0  -- 0    Output (mL) (Chest Tube 2 Right Midaxillary) 20 20 40 20 20 40 0 -- 0    Output (mL) (Chest Tube 1 Right Midaxillary;Pleural) 0 0 0 10 0 10 0 -- 0    Total Output 220 520 740 630 20 650 400 -- 400       Net I/O     2257.4 80 2337.4 0 520 520 -400 -- -400           GI/Nutrition:  Orders Placed This Encounter   Procedures   • Diet NPO     Standing Status:   Standing     Number of Occurrences:   1     Order Specific Question:   Restrict to:     Answer:   Strict [1]     Medications:  Current Facility-Administered Medications   Medication Last Admin   •  gabapentin (NEURONTIN) capsule 300 mg 300 mg at 05/10/21 1314   • cloNIDine (CATAPRES) tablet 0.1 mg 0.1 mg at 05/10/21 1314   • HYDROmorphone (Dilaudid) injection 1-2 mg 2 mg at 05/10/21 1551   • methadone (DOLOPHINE) 10 MG/ML solution 5 mg 5 mg at 05/10/21 1313   • oxyCODONE immediate release (ROXICODONE) tablet 20 mg 20 mg at 05/10/21 1314   • traMADol (ULTRAM) 50 MG tablet 50 mg 50 mg at 05/10/21 1314   • dexmedetomidine (PRECEDEX) 400 mcg/100mL NS premix infusion Stopped at 05/08/21 1800   • cefepime (MAXIPIME) 2 g in  mL IVPB Stopped at 05/10/21 1001   • labetalol (NORMODYNE/TRANDATE) injection 10 mg 10 mg at 05/04/21 0226   • enoxaparin (LOVENOX) inj 40 mg 40 mg at 05/10/21 0514   • Pharmacy Consult: Enteral tube insertion - review meds/change route/product selection     • acetaminophen (Tylenol) tablet 650 mg 650 mg at 05/06/21 0153   • magnesium hydroxide (MILK OF MAGNESIA) suspension 30 mL      And   • senna-docusate (PERICOLACE or SENOKOT S) 8.6-50 MG per tablet 2 tablet 2 tablet at 05/10/21 0515    And   • polyethylene glycol/lytes (MIRALAX) PACKET 1 Packet      And   • bisacodyl (DULCOLAX) suppository 10 mg     • divalproex (DEPAKOTE SPRINKLE) capsule 500 mg 500 mg at 05/10/21 1314   • DULoxetine (CYMBALTA) capsule 60 mg 60 mg at 05/10/21 0515   • PARoxetine (PAXIL) tablet 10 mg 10 mg at 05/10/21 0515   • risperiDONE (RISPERDAL) tablet 2 mg 2 mg at 05/10/21 0514   • Respiratory Therapy Consult     • famotidine (PEPCID) tablet 20 mg 20 mg at 05/10/21 0515   • MD Alert...ICU Electrolyte Replacement per Pharmacy     • lidocaine (XYLOCAINE) 1 % injection 1-2 mL     • ipratropium-albuterol (DUONEB) nebulizer solution       Medical Decision Making, by Problem:  Active Hospital Problems    Diagnosis    • *Bacteremia due to methicillin susceptible Staphylococcus aureus (MSSA) [R78.81, B95.61]    • Agitation requiring sedation protocol [R45.1]    • 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]    • Acute respiratory failure with  hypoxia (HCC) [J96.01]    • Acute bacterial endocarditis [I33.0]    • Empyema of right pleural space (HCC) [J86.9]    • Acute encephalopathy [G93.40]    • Fever [R50.9]    • Pleural effusion, left [J90]    • Atelectasis [J98.11]    • CSF pleocytosis [D72.9]    • Pneumonia [J18.9]    • Tachycardia [R00.0]    • Pseudotumor cerebri [G93.2]    • S/P  shunt [Z98.2]    • Prolonged Q-T interval on ECG [R94.31]    • History of vasculitis [Z86.79]    • History of complicated migraines [G43.109]    • Hyponatremia [E87.1]    • H/O: CVA (cerebrovascular accident) [Z86.73]    • Chronic pain syndrome [G89.4]    • Thrombocytopenia (HCC) [D69.6]    • Anxiety [F41.9]    • Spinal cord stimulator status [Z96.89]    • Goals of care, counseling/discussion [Z71.89]    • Abnormal movement [G25.9]    • Pulmonary abscess (HCC) [J85.2]    • Anasarca [R60.1]    • Thrombocytosis (HCC) [D47.3]    • GERD (gastroesophageal reflux disease) [K21.9]    • Septic shock (HCC) [A41.9, R65.21]    • Anemia [D64.9]    • Hypokalemia [E87.6]      Impression   -Severe sepsis  -Catheter related bloodstream infection due to infected port status post removal 4/12-staph aureus  -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       - Acute left empyema s/p chest tube placement 4/23  -Multiple acute septic pulmonary emboli bilaterally  -Acute bilateral pneumonia due to above     --Pulmonary edema  -Pseudotumor cerebri with underlying  shunt: possible arachnoiditis  -Chronic migraine headaches  -History of autoimmune vasculitis  -Elevated alkaline phosphatase & bilirubin  -Acute encephalopathy due to sepsis      - anemia due to above      - New secondary Klebsiella pneumoniae pneumonia (presumed VAP)     Plan   - Persistent fever likely attributable to empyema and multiple lung abscesses. Currently covered. Source control issue. Fever resolved.  -  Continue cefepime - clinical endpoint - will assess CT scan with surgery when able.    - Await surgery decision making on source control.  - Vent management per pulmonology - SBT trials  - Tolerated the decortication. Cx NGTD.   - No IV abx changes or further ID workup in the moment  - Continue to monitor closely  - Improving overall. Source control issues. Abx fine.   - Current abx covering CNS and back hardware. This HW is not coming out anytime soon per NS.  - No ID changes today     Case and treatment reviewed with patient(best possible),CC team and nursing    > 35 min on floor in ICU with > 50% face to face view and 100% in direct patient care/counseling today.    Dr Lowery

## 2021-05-11 NOTE — DISCHARGE PLANNING
Received Choice form at 1120  Agency/Facility Name: [facility]  Referral sent per Choice form at 1128

## 2021-05-11 NOTE — THERAPY
Occupational Therapy  Daily Treatment     Patient Name: Enedelia Pang  Age:  50 y.o., Sex:  female  Medical Record #: [id number]  Today's Date: 5/11/2021     Precautions  Precautions: Fall Risk, Tracheostomy , Nasogastric Tube  Comments: Chest tube x2    Assessment    Pt seen for OT session. Pt progressing with functional mobility, activity tolerance, and balance. Use of whiteboard and mouthing words for communication, nods somewhat appropriately. BUEs w/ limited strength proximally, RUE <full AROM at shoulder flexion/extension. Use of LUE to compensate to lift hand onto FWW; educated on AROM for B shoulders abduction/flexion; encouraged AROM for writing/ADLs while in chair. Also provided with red therapy sponge for  strength in B hands; edu on purpose and safety. Edu on slow, deep breathing, especially during activity/exercises; anxiousness with activity. Continues to be limited by decreased functional mobility, activity tolerance, cognition, strength, AROM, coordination, balance, and pain which are currently affecting pt's ability to complete ADLs/IADLs at baseline. Will continue to follow.     Plan    Continue current treatment plan.    DC Equipment Recommendations: Unable to determine at this time  Discharge Recommendations: Recommend post-acute placement for additional occupational therapy services prior to discharge home     Objective       05/11/21 0917   Precautions   Precautions Fall Risk;Tracheostomy ;Swallow Precautions ( See Comments);Nasogastric Tube   Comments Chest tube x2   Vitals   O2 Delivery Device T-Piece   Pain 0 - 10 Group   Location Generalized;Shoulder  (mid humerus)   Location Orientation Mid;Right   Therapist Pain Assessment During Activity;Nurse Notified;Post Activity Pain Same as Prior to Activity  (not quantified)   Cognition    Cognition / Consciousness X   Speech/ Communication Nods Appropriately;Intubated / Trached;Writes Comprehensible Notes  (usually nods appropriately;  sometimes unwilling to nod y/n)   Level of Consciousness Alert   Safety Awareness Impaired   Comments pleasant and cooperative; trached by mouths words/uses whiteboard to communicate. anxiousness req redirection/encouragement   Passive ROM Upper Body   Passive ROM Upper Body WDL   Active ROM Upper Body   Active ROM Upper Body  X   Dominant Hand Right   Comments proximal weakness at shoulders- R unable to lift past 45 degrees w/o assist from LUE, attempts to use compensatory muslces. Edu on continued AROM exercises   Strength Upper Body   Upper Body Strength  X   Comments BUEs limited proximally, RUE <full AROM at shoulder flexion/extension. Use of LUE to compensate to lift hand onto FWW   Sitting Upper Body Exercises   Sitting Upper Body Exercises Yes   Comments educated on AROM for B shoulders abduction/flexion; encouraged AROM for writing/ADLs while in chair   Hand Strengthening   Comment Provided with red therapy sponge for  strength in B hands; edu on purpose and safety   Other Treatments   Other Treatments Provided Edu on slow, deep breathing especially during activity/exercises   Balance   Sitting Balance (Static) Fair +   Sitting Balance (Dynamic) Fair   Standing Balance (Static) Fair -   Standing Balance (Dynamic) Fair -   Weight Shift Sitting Fair   Weight Shift Standing Fair   Skilled Intervention Verbal Cuing;Tactile Cuing;Facilitation;Compensatory Strategies;Sequencing   Comments w/ FWW   Bed Mobility    Scooting Supervised   Skilled Intervention Verbal Cuing;Compensatory Strategies   Comments found and left in chair   Activities of Daily Living   Grooming Standing;Minimal Assist  (washing hands, oral care and washing face in sitting)   Lower Body Dressing Supervision  (socks)   Toileting Minimal Assist   Skilled Intervention Verbal Cuing;Tactile Cuing;Facilitation;Compensatory Strategies   How much help from another person does the patient currently need...   Putting on and taking off regular lower  body clothing? 3   Bathing (including washing, rinsing, and drying)? 2   Toileting, which includes using a toilet, bedpan, or urinal? 3   Putting on and taking off regular upper body clothing? 2   Taking care of personal grooming such as brushing teeth? 3   Eating meals? 1   6 Clicks Daily Activity Score 14   Functional Mobility   Sit to Stand Moderate Assist  (improved to min on 2nd stand)   Bed, Chair, Wheelchair Transfer Minimal Assist   Toilet Transfers Minimal Assist   Transfer Method Stand Step   Mobility w/ FWW in room and hallway   Activity Tolerance   Sitting in Chair 45+ min on toilet and found and left in chair   Standing 10 min   Comments HR max 140s during functional ambulation back from BR. Returned to chair.    Patient / Family Goals   Patient / Family Goal #1 to get better   Short Term Goals   Short Term Goal # 1 LB dressing with min A   Goal Outcome # 1 Goal met   Short Term Goal # 2 seated g/h with SPV   Goal Outcome # 2 Goal met, new goal added   Short Term Goal # 2 B  standing g/h with SPV   Short Term Goal # 3 bsc txf with mod A   Goal Outcome # 3 Goal met, new goal added   Short Term Goal # 3 B toilet txf with SPV and no v/cs for safety/technique

## 2021-05-11 NOTE — CARE PLAN
Problem: Mobility  Goal: Risk for activity intolerance will decrease  Outcome: PROGRESSING AS EXPECTED  Intervention: Provide rest periods  Note: Patient tolerating mobility     Problem: Pain Management  Goal: Pain level will decrease to patient's comfort goal  Outcome: PROGRESSING SLOWER THAN EXPECTED  Flowsheets (Taken 5/10/2021 2200)  Pain Rating Scale (NPRS): 9

## 2021-05-11 NOTE — THERAPY
"Speech Language Pathology   Speaking Valve     Patient Name: Enedelia Pang  AGE:  50 y.o., SEX:  female  Medical Record #: [id number]  Today's Date: 5/11/2021     Precautions  Precautions: Fall Risk, Tracheostomy , Nasogastric Tube    Assessment    Per EMR, the patient is a \"50 y.o. female the past medical history of pseudotumor cerebri status post  shunt by Dr. Oh, chronic pain with history of placement of nerve stimulator, vasculitis, right anterior chest port for home IV meds with recurrent infection who presented 4/11/2021 with to [facility] with recurrent port infection.\"  Patient was intubated prior to transfer to [facility].  Right lung decortication by Dr. Ganser 4/28/21.  Underwent percutaneous tracheostomy 5/1.  CXR 5/11 showed, \"Hazy right pulmonary infiltrates and/or effusion, similar compared to prior study.\"    Patient was seen for speaking valve evaluation on this date.  Per RT, patient anxious and going back on the vent at night.  She tolerated tracheal suctioning with minimal thin clear secretions removed and stable vital signs (, SpO2 95% on 8L O2/35% FiO2).  Provided extensive education regarding use of speaking valve, anatomy pre and post tracheostomy, and role of SLP. Patient nodded understanding.  Cuff removed of 6 cc's of air and speaking valve placed in-line with the T-Piece.  With minimal encouragement and training to breathing strategies, the patient produced breathy voice with reduced in intensity, adequate at the short sentence level.  She maintained stable vital signs throughout the session.  Patient stated she had an easier time breathing with the speaking valve on versus the T-Piece.  Patient able to produce strong cough and brought secretions to the oral cavity which she suctioned via [place].  She tolerated speaking valve placement for 25 minutes but began to demonstrate increased fatigue as seen by resting with her eyes closed.  Patient asked for the speaking valve to be removed " "so that she could nap.  Cuff was not reinflated, per RT request.  Tracheal suctioning not completed at the conclusion of the session due to red noted in the suction lines.  Per RN, patient was being over suctioned.  Okay for trained staff to place the speaking valve while the patient is awake with close supervision.  Patient is at the level for a blue dye swallow study.  Discussed with MD, who will place the order.  SLP following     Plan    Recommend Speech Therapy 3 times per week until therapy goals are met for the following treatments:  Expression Training and Patient / Family / Caregiver Education.    Discharge Recommendations: Recommend post-acute placement for additional speech therapy services prior to discharge home    Subjective    Pleasant and agreeable.      Objective       05/11/21 1059   Speaking Valve Airway Assessment   Type of Airway Standard Cuffed Trach   Size of Airway 8.0   FiO2% 35 %   Respiratory Support T - Piece   Trial Speaking Valve Placement   Speaking Valve Style Aqua   Cuff Inflation Inflated   Cuff Pressure 6   Upper Airway Patency Adequate   Tolerated Intervention (Yes / No) Yes   Duration Of Speaking Valve Tolerance (Minutes) 25   Speaking Valve Placement Problems   Respiratory / Cardiac Problems Observed Excessive Back Pressure   Physical / Psychosocial Problems Observed Anxious   Oral-Pharyngeal Status   Current Method Of Nutrition Nasogastric Tube (NG Tube)   Secretion Management Minimal;Thin;Clear / White  (blood tinged secretions in suction tube)   Voice Production Reduced Loudness;Short Phonation Time   Breath Phase Coordination Adequate for Sentence Level   Agrawal Blue Dye Test With Ice Chips Not Tested   Patient / Family Goals   Patient / Family Goal #1 \"I can breathe better\"   Short Term Goals   Short Term Goal # 1 The patient will tolerate the speaking valve while awake with no s/sx of emotional or respiratory distress         "

## 2021-05-11 NOTE — RESPIRATORY CARE
Ventilator Daily Summary     Vent Day # 24, Trach day 9     Spont 5/8 30%    Placed on Tpiece 10L @ 40% at 0702.  Weaned down to 5L @ 30% and lasted 10 hours and 28 min. Placed on vent despite encouragement to remain on tpiece.          Plan: Continue current ventilator settings and wean mechanical ventilation as tolerated per physician orders

## 2021-05-11 NOTE — DISCHARGE PLANNING
Anticipated Discharge Disposition: LTAC    Action: Discussed in IDT rounds; LTAC referral placed by MD.    CM obtained choice for LTAC; choice form sent to [facility].     Barriers to Discharge: LTAC acceptance  Medical clearance    Plan: Care coordination will follow and assist with transfer to LTAC.

## 2021-05-11 NOTE — ASSESSMENT & PLAN NOTE
Daily CBC with conservative transfusion strategy  5/19 transfusion 1 prbc, 5/20 1 prbc, 5/21 1 prbc  Continue to monitor for bleeding in right lung CT's are minimal  Restart VTE prophylaxis monitor closely for bleeding 5/23

## 2021-05-11 NOTE — CARE PLAN
Ventilator Daily Summary    Vent Day # 25, Trach Day 10    8.0 Portex    SPONT 6/8 30%    T/Piece 5L / 30%        Ventilator settings changed this shift: None        Respiratory Procedures: None    Plan: Continue current ventilator settings and wean mechanical ventilation as tolerated per physician orders.

## 2021-05-11 NOTE — CARE PLAN
Problem: Communication  Goal: The ability to communicate needs accurately and effectively will improve  Outcome: PROGRESSING SLOWER THAN EXPECTED  Note: RN and patient working towards goals for Home return. Decreased suction needs to Q 1 hour or as needed, Will mobilize through out the day, Will do a bed bath, Will try speaking valve. Pt has been able to meet most goals today. She is doing great, just needs education and reminders throughout the day on plan and goals.

## 2021-05-11 NOTE — ASSESSMENT & PLAN NOTE
Continue to optimize electrolytes  Avoid QT prolonging medications as able  Repeat EKG as needed  Cardiac monitoring

## 2021-05-12 PROBLEM — R53.81 DEBILITY: Status: ACTIVE | Noted: 2021-05-12

## 2021-05-12 LAB
ANION GAP SERPL CALC-SCNC: 6 MMOL/L (ref 7–16)
BASOPHILS # BLD AUTO: 0.3 % (ref 0–1.8)
BASOPHILS # BLD: 0.03 K/UL (ref 0–0.12)
BUN SERPL-MCNC: 13 MG/DL (ref 8–22)
CALCIUM SERPL-MCNC: 8.9 MG/DL (ref 8.5–10.5)
CHLORIDE SERPL-SCNC: 100 MMOL/L (ref 96–112)
CO2 SERPL-SCNC: 29 MMOL/L (ref 20–33)
CREAT SERPL-MCNC: 0.17 MG/DL (ref 0.5–1.4)
EOSINOPHIL # BLD AUTO: 0 K/UL (ref 0–0.51)
EOSINOPHIL NFR BLD: 0 % (ref 0–6.9)
ERYTHROCYTE [DISTWIDTH] IN BLOOD BY AUTOMATED COUNT: 60.4 FL (ref 35.9–50)
GLUCOSE SERPL-MCNC: 108 MG/DL (ref 65–99)
HCT VFR BLD AUTO: 30.1 % (ref 37–47)
HGB BLD-MCNC: 9.1 G/DL (ref 12–16)
IMM GRANULOCYTES # BLD AUTO: 0.13 K/UL (ref 0–0.11)
IMM GRANULOCYTES NFR BLD AUTO: 1.2 % (ref 0–0.9)
LYMPHOCYTES # BLD AUTO: 0.82 K/UL (ref 1–4.8)
LYMPHOCYTES NFR BLD: 7.8 % (ref 22–41)
MAGNESIUM SERPL-MCNC: 1.8 MG/DL (ref 1.5–2.5)
MCH RBC QN AUTO: 29 PG (ref 27–33)
MCHC RBC AUTO-ENTMCNC: 30.2 G/DL (ref 33.6–35)
MCV RBC AUTO: 95.9 FL (ref 81.4–97.8)
MONOCYTES # BLD AUTO: 0.96 K/UL (ref 0–0.85)
MONOCYTES NFR BLD AUTO: 9.2 % (ref 0–13.4)
NEUTROPHILS # BLD AUTO: 8.55 K/UL (ref 2–7.15)
NEUTROPHILS NFR BLD: 81.5 % (ref 44–72)
NRBC # BLD AUTO: 0 K/UL
NRBC BLD-RTO: 0 /100 WBC
PLATELET # BLD AUTO: 356 K/UL (ref 164–446)
PMV BLD AUTO: 9.5 FL (ref 9–12.9)
POTASSIUM SERPL-SCNC: 3.4 MMOL/L (ref 3.6–5.5)
RBC # BLD AUTO: 3.14 M/UL (ref 4.2–5.4)
SODIUM SERPL-SCNC: 135 MMOL/L (ref 135–145)
WBC # BLD AUTO: 10.5 K/UL (ref 4.8–10.8)

## 2021-05-12 PROCEDURE — 700102 HCHG RX REV CODE 250 W/ 637 OVERRIDE(OP): Performed by: INTERNAL MEDICINE

## 2021-05-12 PROCEDURE — 83735 ASSAY OF MAGNESIUM: CPT

## 2021-05-12 PROCEDURE — 80048 BASIC METABOLIC PNL TOTAL CA: CPT

## 2021-05-12 PROCEDURE — A9270 NON-COVERED ITEM OR SERVICE: HCPCS | Performed by: INTERNAL MEDICINE

## 2021-05-12 PROCEDURE — 700111 HCHG RX REV CODE 636 W/ 250 OVERRIDE (IP): Performed by: INTERNAL MEDICINE

## 2021-05-12 PROCEDURE — 94640 AIRWAY INHALATION TREATMENT: CPT

## 2021-05-12 PROCEDURE — 71045 X-RAY EXAM CHEST 1 VIEW: CPT

## 2021-05-12 PROCEDURE — 85025 COMPLETE CBC W/AUTO DIFF WBC: CPT

## 2021-05-12 PROCEDURE — 99291 CRITICAL CARE FIRST HOUR: CPT | Performed by: INTERNAL MEDICINE

## 2021-05-12 PROCEDURE — 770022 HCHG ROOM/CARE - ICU (200)

## 2021-05-12 PROCEDURE — 700105 HCHG RX REV CODE 258: Performed by: INTERNAL MEDICINE

## 2021-05-12 RX ORDER — MAGNESIUM SULFATE HEPTAHYDRATE 40 MG/ML
2 INJECTION, SOLUTION INTRAVENOUS ONCE
Status: COMPLETED | OUTPATIENT
Start: 2021-05-12 | End: 2021-05-12

## 2021-05-12 RX ADMIN — OXYCODONE HYDROCHLORIDE 20 MG: 10 TABLET ORAL at 14:59

## 2021-05-12 RX ADMIN — FAMOTIDINE 20 MG: 20 TABLET ORAL at 05:19

## 2021-05-12 RX ADMIN — PAROXETINE HYDROCHLORIDE 10 MG: 20 TABLET, FILM COATED ORAL at 05:18

## 2021-05-12 RX ADMIN — RISPERIDONE 2 MG: 1 TABLET, FILM COATED ORAL at 17:46

## 2021-05-12 RX ADMIN — DOCUSATE SODIUM 50 MG AND SENNOSIDES 8.6 MG 2 TABLET: 8.6; 5 TABLET, FILM COATED ORAL at 17:45

## 2021-05-12 RX ADMIN — POTASSIUM BICARBONATE 50 MEQ: 978 TABLET, EFFERVESCENT ORAL at 09:31

## 2021-05-12 RX ADMIN — OXYCODONE HYDROCHLORIDE 20 MG: 10 TABLET ORAL at 17:45

## 2021-05-12 RX ADMIN — METHADONE HYDROCHLORIDE 10 MG: 10 CONCENTRATE ORAL at 05:18

## 2021-05-12 RX ADMIN — METHADONE HYDROCHLORIDE 10 MG: 10 CONCENTRATE ORAL at 15:00

## 2021-05-12 RX ADMIN — CEFEPIME 2 G: 2 INJECTION, POWDER, FOR SOLUTION INTRAVENOUS at 09:30

## 2021-05-12 RX ADMIN — DIVALPROEX SODIUM 500 MG: 125 CAPSULE ORAL at 15:00

## 2021-05-12 RX ADMIN — GABAPENTIN 300 MG: 300 CAPSULE ORAL at 21:14

## 2021-05-12 RX ADMIN — RISPERIDONE 2 MG: 1 TABLET, FILM COATED ORAL at 05:20

## 2021-05-12 RX ADMIN — OXYCODONE HYDROCHLORIDE 20 MG: 10 TABLET ORAL at 02:00

## 2021-05-12 RX ADMIN — DIVALPROEX SODIUM 500 MG: 125 CAPSULE ORAL at 05:19

## 2021-05-12 RX ADMIN — DULOXETINE HYDROCHLORIDE 60 MG: 60 CAPSULE, DELAYED RELEASE ORAL at 05:20

## 2021-05-12 RX ADMIN — METHADONE HYDROCHLORIDE 10 MG: 10 CONCENTRATE ORAL at 21:14

## 2021-05-12 RX ADMIN — ACETAMINOPHEN 650 MG: 325 TABLET, FILM COATED ORAL at 12:33

## 2021-05-12 RX ADMIN — CLONIDINE HYDROCHLORIDE 0.1 MG: 0.1 TABLET ORAL at 05:20

## 2021-05-12 RX ADMIN — CLONIDINE HYDROCHLORIDE 0.1 MG: 0.1 TABLET ORAL at 21:15

## 2021-05-12 RX ADMIN — OXYCODONE HYDROCHLORIDE 20 MG: 10 TABLET ORAL at 21:14

## 2021-05-12 RX ADMIN — DOCUSATE SODIUM 50 MG AND SENNOSIDES 8.6 MG 2 TABLET: 8.6; 5 TABLET, FILM COATED ORAL at 05:20

## 2021-05-12 RX ADMIN — OXYCODONE HYDROCHLORIDE 20 MG: 10 TABLET ORAL at 09:29

## 2021-05-12 RX ADMIN — DIVALPROEX SODIUM 500 MG: 125 CAPSULE ORAL at 21:14

## 2021-05-12 RX ADMIN — OXYCODONE HYDROCHLORIDE 20 MG: 10 TABLET ORAL at 05:20

## 2021-05-12 RX ADMIN — GABAPENTIN 300 MG: 300 CAPSULE ORAL at 05:20

## 2021-05-12 RX ADMIN — CLONIDINE HYDROCHLORIDE 0.1 MG: 0.1 TABLET ORAL at 15:00

## 2021-05-12 RX ADMIN — CEFEPIME 2 G: 2 INJECTION, POWDER, FOR SOLUTION INTRAVENOUS at 17:43

## 2021-05-12 RX ADMIN — MAGNESIUM SULFATE 2 G: 2 INJECTION INTRAVENOUS at 09:30

## 2021-05-12 RX ADMIN — DULOXETINE HYDROCHLORIDE 60 MG: 60 CAPSULE, DELAYED RELEASE ORAL at 17:46

## 2021-05-12 RX ADMIN — GABAPENTIN 300 MG: 300 CAPSULE ORAL at 15:00

## 2021-05-12 RX ADMIN — CEFEPIME 2 G: 2 INJECTION, POWDER, FOR SOLUTION INTRAVENOUS at 02:00

## 2021-05-12 RX ADMIN — ENOXAPARIN SODIUM 40 MG: 40 INJECTION SUBCUTANEOUS at 05:18

## 2021-05-12 ASSESSMENT — ENCOUNTER SYMPTOMS
NAUSEA: 0
SORE THROAT: 0
DIZZINESS: 0
SPUTUM PRODUCTION: 1
ORTHOPNEA: 0
VOMITING: 0
NERVOUS/ANXIOUS: 1
COUGH: 1
DEPRESSION: 0
SHORTNESS OF BREATH: 0
ABDOMINAL PAIN: 0
HEADACHES: 0
NECK PAIN: 1
POLYDIPSIA: 0
FEVER: 0
BACK PAIN: 1
WHEEZING: 0
BLURRED VISION: 0

## 2021-05-12 ASSESSMENT — FIBROSIS 4 INDEX: FIB4 SCORE: 0.43

## 2021-05-12 ASSESSMENT — PAIN DESCRIPTION - PAIN TYPE
TYPE: CHRONIC PAIN

## 2021-05-12 NOTE — PROGRESS NOTES
"Critical Care Progress Note    Date of admission  4/16/2021    Chief Complaint  Endocarditis, bacteremia, empyema, respiratory failure    Hospital Course  Chief Complaint  \"50 y.o. female the past medical history of pseudotumor cerebri status post  shunt by Dr. Oh, chronic pain with history of placement of nerve stimulator, vasculitis, right anterior chest port for home IV meds with recurrent infection who presented 4/11/2021 with to [facility] with recurrent port infection. Found to have MSSA bacteremia, endocarditis, septic pulmonary emboli/empyema/pneumatocele, and CSF pleocytosis concerning for  shunt involvement.  Seen by Dr. Oh, NSGY, at [facility] who did not recommend  shunt removal.  Seen by Dr. Vargas here for concern of fluid collection around her spinal stimulator and he recommended against removal. Remains intubated, encephalopathic.\"     Hospital Course  4/16 admitted to ICU  4/17 VD 2, 2 FFP, pRBC overnight; new chest tube per gen surg  4/18 VD 3, TPa/Dornase with 1400 cc from R chest tube  4/26: R chest tube not functioning, d/c it.  broke his leg and going to OR today, so not available currently.  VD 11.  8/30%. RSBI immediately high on SBT attempt. Followed commands for RN  4/27: VD12. 8/30%. Not tolerating wean, high RSBI and tachycardic.   4/28: VD 13. VATS with Dr. Ganser.    4/29: VD 14. 8/30%. Still not tolerating wean d/t severe tachycardia just with SAT. Trialed precedex and she required fentanyl up to 600/hr to tolerate, so back on propofol. Chest tubes -40  4/30: VD 15. 8/30%. Try ketamine/versed as sedative. Severe tachycardia and HTN with weaning down propofol. Plan for perc trach tomorrow. Chest tubes -40  5/1: perc trach. PICC placed. Chest tubes remain -40.  5/2: Able to spont well on sedation, but very tachy/HTN when sedation is turned down.  5/3 Versed weaned down to 3mg/min.   5/4 off versed and ketamine gtt  5/5 trials of SBT w/ trach  5/6 T piece trial during day, " mobilize to chair x2, rest vent 8/8 at night, started methadone, gabapentin   5/7 T piece during day with, rest PS 8/8 overnight, spacing out dilaudid prn's  5/8 T piece during day, still with anxiety weaning dex gtt  5/10 CT chest  5/11 will require thoracotomy and decortication  5/12  Off ventilator x24 hours      Interval Problem Update   - remains off vent fo 24 hrs   - tolerated PMV yesterday   - seen by thoracic surgery with plans for decortication   - sinus tach, -130   - Purewick in place   - low K and Mag   - AF, WBC WNL   - AAOx4   - good UOP   - remains on cefepime   - coretrak replaced overnight   - scheduled oxycodone and increased methadone yesterday   - ambulating with PT/OT   - BM last night   - jose TFs at goal    Review of Systems  Review of Systems   Constitutional: Positive for malaise/fatigue. Negative for fever.   HENT: Negative for sore throat.    Eyes: Negative for blurred vision.   Respiratory: Positive for cough and sputum production. Negative for shortness of breath and wheezing.    Cardiovascular: Positive for chest pain. Negative for orthopnea and leg swelling.   Gastrointestinal: Negative for abdominal pain, nausea and vomiting.   Genitourinary: Negative for dysuria.   Musculoskeletal: Positive for back pain, joint pain and neck pain.   Skin: Negative for rash.   Neurological: Negative for dizziness and headaches.   Endo/Heme/Allergies: Negative for polydipsia.   Psychiatric/Behavioral: Negative for depression. The patient is nervous/anxious.    All other systems reviewed and are negative.       Vital Signs for last 24 hours   Temp:  [36.2 °C (97.2 °F)-37 °C (98.6 °F)] 37 °C (98.6 °F)  Pulse:  [105-132] 105  Resp:  [17-23] 18  BP: (110-156)/() 110/65  SpO2:  [92 %-96 %] 92 %    Respiratory Information for the last 24 hours    TP 7 lpm, 35%, off vent x 26 hrs    Physical Exam   Physical Exam  Vitals and nursing note reviewed.   Constitutional:       General: She is awake. She  is not in acute distress.     Appearance: She is normal weight. She is ill-appearing. She is not diaphoretic.      Interventions: She is intubated.   HENT:      Head: Normocephalic.      Nose: Nose normal.      Comments: Nasal feeding tube in place     Mouth/Throat:      Mouth: Mucous membranes are moist.      Pharynx: Oropharynx is clear.   Eyes:      Conjunctiva/sclera: Conjunctivae normal.      Pupils: Pupils are equal, round, and reactive to light.   Neck:      Comments: Tracheostomy tube in place without surrounding erythema  Cardiovascular:      Rate and Rhythm: Regular rhythm. Tachycardia present. Occasional extrasystoles are present.     Chest Wall: PMI is not displaced.      Pulses: Normal pulses.      Heart sounds: Murmur heard.     Pulmonary:      Effort: No tachypnea or accessory muscle usage. She is intubated.      Breath sounds: Examination of the right-middle field reveals decreased breath sounds and rhonchi. Examination of the right-lower field reveals decreased breath sounds and rhonchi. Examination of the left-lower field reveals rhonchi. Decreased breath sounds and rhonchi present. No wheezing.      Comments: Chest tubes in place x2 with minimal bloody output, no air leak, minimal secretions including with tracheal suctioning  Abdominal:      General: Bowel sounds are normal. There is no distension.      Palpations: Abdomen is soft.      Tenderness: There is no abdominal tenderness. There is no guarding.   Genitourinary:     Comments: Pure wick catheter in place  Musculoskeletal:         General: No tenderness.      Cervical back: No rigidity or tenderness.      Right lower leg: No edema.      Left lower leg: No edema.   Skin:     General: Skin is warm and dry.      Capillary Refill: Capillary refill takes less than 2 seconds.      Coloration: Skin is not jaundiced.   Neurological:      General: No focal deficit present.      Mental Status: She is alert and oriented to person, place, and time.       Cranial Nerves: No cranial nerve deficit.      Motor: Weakness (Generalized) present.   Psychiatric:         Behavior: Behavior is cooperative.         Thought Content: Thought content normal.      Comments: Intermittent anxiety         Medications  Current Facility-Administered Medications   Medication Dose Route Frequency Provider Last Rate Last Admin   • HYDROmorphone (Dilaudid) injection 1-2 mg  1-2 mg Intravenous Q8HRS PRN Jeremy M Gonda, M.D.       • methadone (DOLOPHINE) 10 MG/ML solution 10 mg  10 mg Enteral Tube Q8HRS Jeremy M Gonda, M.D.   10 mg at 05/12/21 0518   • ketorolac (TORADOL) injection 15 mg  15 mg Intravenous Q6HRS PRN Jeremy M Gonda, M.D.       • gabapentin (NEURONTIN) capsule 300 mg  300 mg Enteral Tube Q8HRS Juventino Lozano M.D.   300 mg at 05/12/21 0520   • cloNIDine (CATAPRES) tablet 0.1 mg  0.1 mg Enteral Tube Q8HRS Juventino Lozano M.D.   0.1 mg at 05/12/21 0520   • oxyCODONE immediate release (ROXICODONE) tablet 20 mg  20 mg Enteral Tube Q4HRS Jnoathan Upton D.O.   20 mg at 05/12/21 0520   • traMADol (ULTRAM) 50 MG tablet 50 mg  50 mg Enteral Tube Q6HRS PRN Jonathan Upton D.O.   50 mg at 05/11/21 0320   • cefepime (MAXIPIME) 2 g in  mL IVPB  2 g Intravenous Q8HRS Eric Lowery M.D.   Stopped at 05/12/21 0230   • labetalol (NORMODYNE/TRANDATE) injection 10 mg  10 mg Intravenous Q4HRS PRN Leti Sun M.D.   10 mg at 05/04/21 0226   • enoxaparin (LOVENOX) inj 40 mg  40 mg Subcutaneous DAILY Benoit Loya Jr., D.O.   40 mg at 05/12/21 0518   • Pharmacy Consult: Enteral tube insertion - review meds/change route/product selection  1 Each Other PHARMACY TO DOSE Elliott Salvador M.D.       • acetaminophen (Tylenol) tablet 650 mg  650 mg Enteral Tube Q4HRS PRN Elliott Salvador M.D.   650 mg at 05/06/21 0153   • magnesium hydroxide (MILK OF MAGNESIA) suspension 30 mL  30 mL Enteral Tube QDAY PRN Elliott Salvador M.D.        And   • senna-docusate (PERICOLACE or SENOKOT S) 8.6-50  MG per tablet 2 tablet  2 tablet Enteral Tube BID Elliott Salvador M.D.   2 tablet at 05/12/21 0520    And   • polyethylene glycol/lytes (MIRALAX) PACKET 1 Packet  1 Packet Enteral Tube QDAY PRN Elliott Salvador M.D.   1 Packet at 05/11/21 0525    And   • bisacodyl (DULCOLAX) suppository 10 mg  10 mg Rectal QDAY PRN Elliott Salvador M.D.       • divalproex (DEPAKOTE SPRINKLE) capsule 500 mg  500 mg Enteral Tube Q8HRS Elliott Salvador M.D.   500 mg at 05/12/21 0519   • DULoxetine (CYMBALTA) capsule 60 mg  60 mg Enteral Tube BID Elliott Salvador M.D.   60 mg at 05/12/21 0520   • PARoxetine (PAXIL) tablet 10 mg  10 mg Enteral Tube QAM Elliott Salvador M.D.   10 mg at 05/12/21 0518   • risperiDONE (RISPERDAL) tablet 2 mg  2 mg Enteral Tube BID Elliott Salvador M.D.   2 mg at 05/12/21 0520   • Respiratory Therapy Consult   Nebulization Continuous RT Man Wahl M.D.       • famotidine (PEPCID) tablet 20 mg  20 mg Enteral Tube Q12HRS Man Wahl M.D.   20 mg at 05/12/21 0519   • MD Alert...ICU Electrolyte Replacement per Pharmacy   Other PHARMACY TO DOSE Man Wahl M.D.       • lidocaine (XYLOCAINE) 1 % injection 1-2 mL  1-2 mL Tracheal Tube Q30 MIN PRN Man Wahl M.D.       • ipratropium-albuterol (DUONEB) nebulizer solution  3 mL Nebulization Q2HRS PRN (RT) Man Wahl M.D.           Fluids    Intake/Output Summary (Last 24 hours) at 5/12/2021 0652  Last data filed at 5/12/2021 0600  Gross per 24 hour   Intake 1050 ml   Output 1320 ml   Net -270 ml       Laboratory          Recent Labs     05/10/21  0400 05/11/21  0315 05/12/21  0420   SODIUM 137 136 135   POTASSIUM 3.9 4.0 3.4*   CHLORIDE 101 102 100   CO2 28 28 29   BUN 14 15 13   CREATININE <0.17* <0.17* 0.17*   MAGNESIUM 1.8 1.8 1.8   CALCIUM 8.7 9.0 8.9     Recent Labs     05/10/21  0400 05/10/21  1606 05/11/21  0315 05/12/21  0420   ALTSGPT 28  --   --   --    ASTSGOT 17  --   --   --    ALKPHOSPHAT 1073*  --   --   --     TBILIRUBIN 0.6  --   --   --    PREALBUMIN  --  17.4*  --   --    GLUCOSE 97  --  109* 108*     Recent Labs     05/10/21  0400 05/11/21  0315 05/12/21  0420   WBC 10.4 8.8 10.5   NEUTSPOLYS 77.30* 74.80* 81.50*   LYMPHOCYTES 12.70* 14.80* 7.80*   MONOCYTES 8.10 8.70 9.20   EOSINOPHILS 0.10 0.00 0.00   BASOPHILS 0.50 0.60 0.30   ASTSGOT 17  --   --    ALTSGPT 28  --   --    ALKPHOSPHAT 1073*  --   --    TBILIRUBIN 0.6  --   --      Recent Labs     05/10/21  0400 05/11/21  0315 05/12/21  0420   RBC 3.03* 2.97* 3.14*   HEMOGLOBIN 8.8* 8.8* 9.1*   HEMATOCRIT 29.1* 28.6* 30.1*   PLATELETCT 349 342 356       Imaging  X-Ray:  I have personally reviewed the images and compared with prior images. and My impression is: Unchanged large loculated right-sided effusion with left-sided infiltrates, tracheostomy tube/gastric tube/right chest tubes x2 in good position  4/23 MICAH: large vegetations in MV and TV    Microbiology:  4/16 BCx 2/2 MSSA  4/17 BCx 2/2: negative  4/20 Trach aspirate: K. pneumonia  4/24 BAL: K. Pneumonia >100k cfu/ml.   4/25 BCx 2/2: negative  4/28 Pleural tissue (intraop OR specimen): K. Pneumonia. Pansensitive  5/4 BCx 2/2: pending, negative to date    Assessment/Plan  * Bacteremia due to methicillin susceptible Staphylococcus aureus (MSSA)- (present on admission)  Assessment & Plan  Source presumed right anterior chest line/port with endocarditis  Multiple sources for MSSA including MV/TV vegetation, port, spinal stimulator/ shunt  Status post port removal at [facility]  Last positive blood cultures were 4/17  Status post treatment with nafcillin from 4/16-4/23  ID following, continue cefepime    Empyema of right pleural space (HCC)- (present on admission)  Assessment & Plan  R empyema due to septic pulmonary emboli  4/16 Chest tube placement at [facility]  4/18 Upsized by Dr Benedict  4/18-4/20 Received TPA/dornase.  Stopped b/c Hgb dropped requiring transfusion  4/26 was discontinued due to stopped functioning   4/28  s/p right VATS and decortication. Cx grew of Klebsiella pneumonia    Discussed with Dr. Ganser, plans for repeat decortication in the near future.  Continue chest tube drainage in the meantime    Acute bacterial endocarditis- (present on admission)  Assessment & Plan  MSSA bacteremia at OSH. First negative blood cultures were on 4/17  Infected port removed at [facility]   shunt and spinal stimulator could be seeded, NSGY has consulted on both and don't recommend removal at admission due to HD instability.   MV and TV vegetations with septic pulmonary emboli  Nataly ID Dr Omer/Beverley following.   Deemed not valvular surgical candidate at this time  Continue cefepime  Status post nafcilin from 4/16-4/22  ID following    Acute respiratory failure with hypoxia (HCC)- (present on admission)  Assessment & Plan  4/15 Intubated for acute hypoxic respiratory failure at [facility] due to hydro/pneumo, trapped lung, lung abscesses. 5/1 s/p percutaneous tracheostomy    Continue progressive lengthening T-piece trials, limit ventilator as much as possible  Continue capping trach with speaking valve/SLP  Titrate FiO2 to goal SPO2 greater than 90%, pulmonary toiletry    Chronic pain syndrome- (present on admission)  Assessment & Plan  Chronic back pain and intractable headaches  History of nerve stimulator   Dr Vargas consulted at admission, no plan to remove stimulator due to her clinical instability  Multimodal with scheduled oxycodone, gabapentin, methadone (increased dose 5/11)    Acute encephalopathy- (present on admission)  Assessment & Plan  Improved  Begin to titrate down pain medicine as tolerates  Reorient and maintain sleep/wake cycle, mobilize    Elevated alkaline phosphatase level- (present on admission)  Assessment & Plan  Unchanged --> recheck tomorrow  With dilated biliary ducts on imaging, stable  Unable to get MRCP due to nerve stimulator, ?  Eventual ERCP    Spinal cord stimulator status  Assessment & Plan  Patient's  stimulator is [employer]. It is FDA approved as MRI compatible, but the company has gone out of business, so there is no support team to assist with MRI.  Thus, if MRI were performed, our radiologists would need to protocol it correctly to manage the stimulator. The former rep from [employer] is Andre, [phone].  Information obtained from Dr. Mahoney's office, [phone].    Per  (5/4/2021), it is not MRI compatible unfortunately.     Pleural effusion, left  Assessment & Plan  Parapneumonic fluid from abscesses  CT guided chest tube placed 4/23  TPA/dornase one dose on 4/24.  Hold d/t dropping Hgb  CT chest 4/25 with resolution of fluid  CT clamped 4/29-no significant accumulation of fluid so d/c 4/30  Monitor for now radiographically    Atelectasis  Assessment & Plan  Pulmonary toiletry, ambulation    Pneumonia  Assessment & Plan  MSSA septic emboli with empyema   BAL 4/20 klebsiella pneumonia  BAL 4/24 still heavy growth of klebsiella  4/28 OR pleural fluid specimen growing klebsiella.   Continue cefepime per ID    Anxiety- (present on admission)  Assessment & Plan  Improving  Continue Depakote, paxil, cymbalta, gabapentin, clonidine at current doses  Limited as needed benzodiazepines    S/P  shunt- (present on admission)  Assessment & Plan  History of pseudotumor cerebri  History of  shunt by Dr. Oh  [facility], Dr. Oh consulted at [facility], recommended MRI but unable given her spinal stimulator    Debility  Assessment & Plan  PT/OT/SLP eval    Goals of care, counseling/discussion  Assessment & Plan  Palliative consulted  Full code    Abnormal movement  Assessment & Plan  Tardive dyskinesia  Monitor with medication management    Prolonged Q-T interval on ECG- (present on admission)  Assessment & Plan  Optimize electrolytes, continuous telemetry monitoring  Avoid QT prolonging medications as able    Tachycardia  Assessment & Plan  Persistent, sinus tach  Treat underlying  causes    Anemia  Assessment & Plan  Daily CBC with conservative transfusion strategy    Hypomagnesemia  Assessment & Plan  Replete again today and monitor    Hypokalemia  Assessment & Plan  Replete again today and monitor     VTE:  lovenox  Ulcer: Not Indicated  Lines: Central Line  Ongoing indication addressed -PICC    I have performed a physical exam and reviewed and updated ROS and Plan today (5/12/2021). In review of yesterday's note (5/11/2021), there are no changes except as documented above.     Patient is critically ill today requiring active management of her intermittent ventilator support. High risk of deterioration and worsening vital organ dysfunction and death without the above critical care interventions.    Discussed patient condition and risk of morbidity and/or mortality with RN, RT, Therapies, Pharmacy, Charge nurse / hot rounds, Patient and general surgery and infectious disease.  Critical care time = 32 minutes in directly providing and coordinating critical care and extensive data review.  No time overlap and excludes procedures

## 2021-05-12 NOTE — ASSESSMENT & PLAN NOTE
Persistent, sinus tach, now starting to see occasional sinus rhythm in the 90s, slowly better  Treat underlying causes

## 2021-05-12 NOTE — PROGRESS NOTES
Monitor summary:        Rhythm: SR/ST  Rate: 's  Ectopy: pvc's ocasinally  Measurements: .14/.10/.32        12hr chart check

## 2021-05-12 NOTE — PALLIATIVE CARE
Palliative Care follow-up  Pt off vent and worked with speaking valve yesterday. Stopped by pts room, pt currently sleeping. Spoke with pts bedside RN. Plan for possible surgery and then DC to LTAC.    Plan: PC RN will remain available for pt and family    Thank you for allowing Palliative Care to participate in this patient's care. Please feel free to call [phone] with any questions or concerns.

## 2021-05-12 NOTE — DISCHARGE PLANNING
Care Transition Team Discharge Planning    Anticipated Discharge Disposition: d/c to LTAC    Action: [place] received FM from yesterday afternoon.  Anh has received the LANE/LTAC referral, and running insurance auth.    Lsw called Anh, liaison w/ LANE. Lsw left VM requesting INS AUTH update.    Lsw sent text to MD updating as above and questioning when pt may be able to d/c to LTAC via [facility].      Barriers to Discharge: medical clearance/stabiltiy    Plan: Lsw will continue to follow, and assist w/ d/c planning.

## 2021-05-13 LAB
ALBUMIN SERPL BCP-MCNC: 3.1 G/DL (ref 3.2–4.9)
ALBUMIN/GLOB SERPL: 0.6 G/DL
ALP SERPL-CCNC: 741 U/L (ref 30–99)
ALT SERPL-CCNC: 16 U/L (ref 2–50)
ANION GAP SERPL CALC-SCNC: 7 MMOL/L (ref 7–16)
AST SERPL-CCNC: 13 U/L (ref 12–45)
BASOPHILS # BLD AUTO: 0.4 % (ref 0–1.8)
BASOPHILS # BLD: 0.04 K/UL (ref 0–0.12)
BILIRUB SERPL-MCNC: 0.6 MG/DL (ref 0.1–1.5)
BUN SERPL-MCNC: 15 MG/DL (ref 8–22)
CALCIUM SERPL-MCNC: 9.5 MG/DL (ref 8.5–10.5)
CHLORIDE SERPL-SCNC: 103 MMOL/L (ref 96–112)
CO2 SERPL-SCNC: 28 MMOL/L (ref 20–33)
CREAT SERPL-MCNC: 0.22 MG/DL (ref 0.5–1.4)
EOSINOPHIL # BLD AUTO: 0 K/UL (ref 0–0.51)
EOSINOPHIL NFR BLD: 0 % (ref 0–6.9)
ERYTHROCYTE [DISTWIDTH] IN BLOOD BY AUTOMATED COUNT: 59.7 FL (ref 35.9–50)
GLOBULIN SER CALC-MCNC: 5 G/DL (ref 1.9–3.5)
GLUCOSE SERPL-MCNC: 107 MG/DL (ref 65–99)
HCT VFR BLD AUTO: 32.1 % (ref 37–47)
HGB BLD-MCNC: 9.8 G/DL (ref 12–16)
IMM GRANULOCYTES # BLD AUTO: 0.08 K/UL (ref 0–0.11)
IMM GRANULOCYTES NFR BLD AUTO: 0.8 % (ref 0–0.9)
LYMPHOCYTES # BLD AUTO: 1.12 K/UL (ref 1–4.8)
LYMPHOCYTES NFR BLD: 11.2 % (ref 22–41)
MAGNESIUM SERPL-MCNC: 1.8 MG/DL (ref 1.5–2.5)
MCH RBC QN AUTO: 29.2 PG (ref 27–33)
MCHC RBC AUTO-ENTMCNC: 30.5 G/DL (ref 33.6–35)
MCV RBC AUTO: 95.5 FL (ref 81.4–97.8)
MONOCYTES # BLD AUTO: 0.79 K/UL (ref 0–0.85)
MONOCYTES NFR BLD AUTO: 7.9 % (ref 0–13.4)
NEUTROPHILS # BLD AUTO: 7.93 K/UL (ref 2–7.15)
NEUTROPHILS NFR BLD: 79.7 % (ref 44–72)
NRBC # BLD AUTO: 0 K/UL
NRBC BLD-RTO: 0 /100 WBC
PHOSPHATE SERPL-MCNC: 3.3 MG/DL (ref 2.5–4.5)
PLATELET # BLD AUTO: 374 K/UL (ref 164–446)
PMV BLD AUTO: 9.2 FL (ref 9–12.9)
POTASSIUM SERPL-SCNC: 4 MMOL/L (ref 3.6–5.5)
PROT SERPL-MCNC: 8.1 G/DL (ref 6–8.2)
RBC # BLD AUTO: 3.36 M/UL (ref 4.2–5.4)
SODIUM SERPL-SCNC: 138 MMOL/L (ref 135–145)
WBC # BLD AUTO: 10 K/UL (ref 4.8–10.8)

## 2021-05-13 PROCEDURE — A9270 NON-COVERED ITEM OR SERVICE: HCPCS | Performed by: HOSPITALIST

## 2021-05-13 PROCEDURE — A9270 NON-COVERED ITEM OR SERVICE: HCPCS | Performed by: INTERNAL MEDICINE

## 2021-05-13 PROCEDURE — 700102 HCHG RX REV CODE 250 W/ 637 OVERRIDE(OP): Performed by: INTERNAL MEDICINE

## 2021-05-13 PROCEDURE — 80053 COMPREHEN METABOLIC PANEL: CPT

## 2021-05-13 PROCEDURE — 85025 COMPLETE CBC W/AUTO DIFF WBC: CPT

## 2021-05-13 PROCEDURE — 83735 ASSAY OF MAGNESIUM: CPT

## 2021-05-13 PROCEDURE — 700105 HCHG RX REV CODE 258: Performed by: INTERNAL MEDICINE

## 2021-05-13 PROCEDURE — 700111 HCHG RX REV CODE 636 W/ 250 OVERRIDE (IP): Performed by: INTERNAL MEDICINE

## 2021-05-13 PROCEDURE — 84100 ASSAY OF PHOSPHORUS: CPT

## 2021-05-13 PROCEDURE — 770001 HCHG ROOM/CARE - MED/SURG/GYN PRIV*

## 2021-05-13 PROCEDURE — 92610 EVALUATE SWALLOWING FUNCTION: CPT

## 2021-05-13 PROCEDURE — 99233 SBSQ HOSP IP/OBS HIGH 50: CPT | Performed by: EMERGENCY MEDICINE

## 2021-05-13 PROCEDURE — 94640 AIRWAY INHALATION TREATMENT: CPT

## 2021-05-13 PROCEDURE — 700102 HCHG RX REV CODE 250 W/ 637 OVERRIDE(OP): Performed by: HOSPITALIST

## 2021-05-13 PROCEDURE — 99233 SBSQ HOSP IP/OBS HIGH 50: CPT | Performed by: HOSPITALIST

## 2021-05-13 RX ORDER — MAGNESIUM SULFATE HEPTAHYDRATE 40 MG/ML
2 INJECTION, SOLUTION INTRAVENOUS ONCE
Status: COMPLETED | OUTPATIENT
Start: 2021-05-13 | End: 2021-05-13

## 2021-05-13 RX ORDER — ONDANSETRON 2 MG/ML
4 INJECTION INTRAMUSCULAR; INTRAVENOUS EVERY 4 HOURS PRN
Status: DISCONTINUED | OUTPATIENT
Start: 2021-05-13 | End: 2021-06-18 | Stop reason: HOSPADM

## 2021-05-13 RX ORDER — OXYCODONE HYDROCHLORIDE 10 MG/1
10 TABLET ORAL EVERY 4 HOURS
Status: DISCONTINUED | OUTPATIENT
Start: 2021-05-13 | End: 2021-05-14

## 2021-05-13 RX ORDER — OXYCODONE HYDROCHLORIDE 5 MG/1
5 TABLET ORAL EVERY 4 HOURS PRN
Status: DISCONTINUED | OUTPATIENT
Start: 2021-05-13 | End: 2021-05-28

## 2021-05-13 RX ORDER — ACETAMINOPHEN 500 MG
500 TABLET ORAL 3 TIMES DAILY
Status: DISCONTINUED | OUTPATIENT
Start: 2021-05-13 | End: 2021-05-28

## 2021-05-13 RX ADMIN — METHADONE HYDROCHLORIDE 10 MG: 10 CONCENTRATE ORAL at 13:48

## 2021-05-13 RX ADMIN — CLONIDINE HYDROCHLORIDE 0.1 MG: 0.1 TABLET ORAL at 06:22

## 2021-05-13 RX ADMIN — CEFEPIME 2 G: 2 INJECTION, POWDER, FOR SOLUTION INTRAVENOUS at 17:05

## 2021-05-13 RX ADMIN — DIVALPROEX SODIUM 500 MG: 125 CAPSULE ORAL at 06:20

## 2021-05-13 RX ADMIN — DIVALPROEX SODIUM 500 MG: 125 CAPSULE ORAL at 21:08

## 2021-05-13 RX ADMIN — OXYCODONE HYDROCHLORIDE 20 MG: 10 TABLET ORAL at 06:21

## 2021-05-13 RX ADMIN — TRAMADOL HYDROCHLORIDE 50 MG: 50 TABLET ORAL at 14:38

## 2021-05-13 RX ADMIN — METHADONE HYDROCHLORIDE 10 MG: 10 CONCENTRATE ORAL at 06:20

## 2021-05-13 RX ADMIN — ACETAMINOPHEN 500 MG: 325 TABLET, FILM COATED ORAL at 17:30

## 2021-05-13 RX ADMIN — OXYCODONE HYDROCHLORIDE 20 MG: 10 TABLET ORAL at 17:05

## 2021-05-13 RX ADMIN — DOCUSATE SODIUM 50 MG AND SENNOSIDES 8.6 MG 2 TABLET: 8.6; 5 TABLET, FILM COATED ORAL at 06:20

## 2021-05-13 RX ADMIN — OXYCODONE HYDROCHLORIDE 20 MG: 10 TABLET ORAL at 09:20

## 2021-05-13 RX ADMIN — DIVALPROEX SODIUM 500 MG: 125 CAPSULE ORAL at 13:48

## 2021-05-13 RX ADMIN — OXYCODONE HYDROCHLORIDE 10 MG: 10 TABLET ORAL at 21:08

## 2021-05-13 RX ADMIN — CEFEPIME 2 G: 2 INJECTION, POWDER, FOR SOLUTION INTRAVENOUS at 09:20

## 2021-05-13 RX ADMIN — ACETAMINOPHEN 500 MG: 325 TABLET, FILM COATED ORAL at 21:08

## 2021-05-13 RX ADMIN — DULOXETINE HYDROCHLORIDE 60 MG: 60 CAPSULE, DELAYED RELEASE ORAL at 17:05

## 2021-05-13 RX ADMIN — METHADONE HYDROCHLORIDE 10 MG: 10 CONCENTRATE ORAL at 21:07

## 2021-05-13 RX ADMIN — ENOXAPARIN SODIUM 40 MG: 40 INJECTION SUBCUTANEOUS at 06:20

## 2021-05-13 RX ADMIN — DULOXETINE HYDROCHLORIDE 60 MG: 60 CAPSULE, DELAYED RELEASE ORAL at 06:00

## 2021-05-13 RX ADMIN — CLONIDINE HYDROCHLORIDE 0.1 MG: 0.1 TABLET ORAL at 13:48

## 2021-05-13 RX ADMIN — GABAPENTIN 300 MG: 300 CAPSULE ORAL at 13:48

## 2021-05-13 RX ADMIN — RISPERIDONE 2 MG: 1 TABLET, FILM COATED ORAL at 06:21

## 2021-05-13 RX ADMIN — ONDANSETRON 4 MG: 2 INJECTION INTRAMUSCULAR; INTRAVENOUS at 04:31

## 2021-05-13 RX ADMIN — OXYCODONE HYDROCHLORIDE 20 MG: 10 TABLET ORAL at 02:09

## 2021-05-13 RX ADMIN — GABAPENTIN 300 MG: 300 CAPSULE ORAL at 06:21

## 2021-05-13 RX ADMIN — PAROXETINE HYDROCHLORIDE 10 MG: 20 TABLET, FILM COATED ORAL at 06:22

## 2021-05-13 RX ADMIN — DOCUSATE SODIUM 50 MG AND SENNOSIDES 8.6 MG 2 TABLET: 8.6; 5 TABLET, FILM COATED ORAL at 17:05

## 2021-05-13 RX ADMIN — CEFEPIME 2 G: 2 INJECTION, POWDER, FOR SOLUTION INTRAVENOUS at 02:09

## 2021-05-13 RX ADMIN — MAGNESIUM SULFATE 2 G: 2 INJECTION INTRAVENOUS at 09:20

## 2021-05-13 RX ADMIN — CLONIDINE HYDROCHLORIDE 0.1 MG: 0.1 TABLET ORAL at 21:08

## 2021-05-13 RX ADMIN — RISPERIDONE 2 MG: 1 TABLET, FILM COATED ORAL at 17:05

## 2021-05-13 RX ADMIN — OXYCODONE HYDROCHLORIDE 20 MG: 10 TABLET ORAL at 13:48

## 2021-05-13 RX ADMIN — GABAPENTIN 300 MG: 300 CAPSULE ORAL at 21:08

## 2021-05-13 ASSESSMENT — PAIN DESCRIPTION - PAIN TYPE
TYPE: CHRONIC PAIN
TYPE: ACUTE PAIN
TYPE: CHRONIC PAIN
TYPE: ACUTE PAIN
TYPE: CHRONIC PAIN
TYPE: ACUTE PAIN
TYPE: ACUTE PAIN
TYPE: CHRONIC PAIN

## 2021-05-13 ASSESSMENT — PATIENT HEALTH QUESTIONNAIRE - PHQ9
SUM OF ALL RESPONSES TO PHQ9 QUESTIONS 1 AND 2: 0
2. FEELING DOWN, DEPRESSED, IRRITABLE, OR HOPELESS: NOT AT ALL
1. LITTLE INTEREST OR PLEASURE IN DOING THINGS: NOT AT ALL

## 2021-05-13 ASSESSMENT — ENCOUNTER SYMPTOMS
ABDOMINAL PAIN: 0
PHOTOPHOBIA: 0
FEVER: 0

## 2021-05-13 NOTE — CARE PLAN
Problem: Communication  Goal: The ability to communicate needs accurately and effectively will improve  Outcome: Progressing     Problem: Safety  Goal: Will remain free from falls  Outcome: Progressing     Problem: Infection  Goal: Will remain free from infection  Outcome: Progressing     Problem: Venous Thromboembolism (VTW)/Deep Vein Thrombosis (DVT) Prevention:  Goal: Patient will participate in Venous Thrombosis (VTE)/Deep Vein Thrombosis (DVT)Prevention Measures  Outcome: Progressing     Problem: Bowel/Gastric:  Goal: Normal bowel function is maintained or improved  Outcome: Progressing  Goal: Will not experience complications related to bowel motility  Outcome: Progressing     Problem: Knowledge Deficit  Goal: Knowledge of disease process/condition, treatment plan, diagnostic tests, and medications will improve  Outcome: Progressing  Goal: Knowledge of the prescribed therapeutic regimen will improve  Outcome: Progressing     Problem: Discharge Barriers/Planning  Goal: Patient's continuum of care needs will be met  Outcome: Progressing     Problem: Fluid Volume:  Goal: Will maintain balanced intake and output  Outcome: Progressing     Problem: Respiratory:  Goal: Respiratory status will improve  Outcome: Progressing     Problem: Skin Integrity  Goal: Risk for impaired skin integrity will decrease  Outcome: Progressing     Problem: Pain Management  Goal: Pain level will decrease to patient's comfort goal  Outcome: Progressing     Problem: Psychosocial Needs:  Goal: Level of anxiety will decrease  Outcome: Progressing     Problem: Mechanical Ventilation  Goal: Ability to express needs and understand communication  Outcome: Progressing  Goal: Successful weaning off mechanical ventilator. Spontaneously maintains adequate gas exchange  Outcome: Progressing     Problem: Hemodynamic Status  Goal: Vital Signs and Fluid Balance Management  Outcome: Progressing     Problem: Nutrition Deficit  Goal: Enteral Nutrition  Management  Outcome: Progressing     Problem: Urinary Elimination:  Goal: Ability to reestablish a normal urinary elimination pattern will improve  Outcome: Progressing     Problem: Mobility  Goal: Risk for activity intolerance will decrease  Outcome: Progressing     Problem: Knowledge Deficit - Standard  Goal: Patient and family/care givers will demonstrate understanding of plan of care, disease process/condition, diagnostic tests and medications  Outcome: Progressing     Problem: Fall Risk  Goal: Patient will remain free from falls  Outcome: Progressing   The patient is Stable - Low risk of patient condition declining or worsening         Progress made toward(s) clinical / shift goals:  increased mobility    Patient is not progressing towards the following goals:

## 2021-05-13 NOTE — CARE PLAN
Problem: Ventilation Defect:  Goal: Ability to achieve and maintain unassisted ventilation or tolerate decreased levels of ventilator support  Outcome: Progressing

## 2021-05-13 NOTE — PROGRESS NOTES
Infectious Disease Progress Note    Author: Dixie Albarran M.D. Date & Time created: 5/13/2021  12:19 PM  Cefepime 4/23 -  current Day #18     Thoracoscopy & Decortication - 4/28     PRIOR Rx:   Zosyn 4/22-4/23  Previous: Unasyn, Zosyn, Vanco, Daptomycin  Ceftaroline: start 4/13-4/15  Nafcillin 2g Q4 hrs 4/15-4/23 4/14: Unable to give name of previous NSG or neurologist. Seems confused, but able to say some sentences fluently.   4/15: Line cultures now growing MSSA. As well as from the blood. Repeat blood culture 4/13+ in both sets. Patient has worsening confusion. CSF fluid analyses suggest pleocytosis. Cultures are no growth from the CSF. Chest CT was ordered this morning indicating a loculated right pleural effusion as well as bilateral septic emboli. Dr. Mack spoke with Dr. Oh yesterday and no surgical intervention unless clinical deterioration.  4/16: Increased respiratory distress.  Tachypneic.  CT with loculated collection.  Dr Resendiz not available.  No thoracic surgeon available.  Plans to have pulmonary place CT.  Transferring to ICU.  4/17: Transferred to [facility] last night. Hgb dropped to 6.4 requiring PRBC transfusion. Requiring 2 pressors. Fio2 50%. Febrile 38.8. Pending OR decortication of empyema and hemothorax. Chest tube [facility] at [facility] shows 8,371 WBC, ,000, 74% N  4/18: Chest tube was upsized by surgery yesterday, no decortication. WBC 22k. Fio2 40%. Vasopressin. Levophed down to 2mcg. Afebrile 24 hrs. Last fever 38.6 on 4/16.   4/19: Still on vent. Levo 3 mcg, vasopressin. Afebrile 72 hours. WBC 21k. BC 4/17 NGTD x 48 hours. Unable to do MRI brain given neurostimulator per RN.   4/20: Remaining afebrile. Hb 6.2 and undergoing transfusion today.WBC 24,100, 5% bands.1700 ml CT output. Copious bloody ET secretions. No pressors. Receiving dornase and TPA per CT. Right pleural effusion not large per CXR today.   4/21: WBC 31k. Bronchoscopy yesterday showing blood. Cultures sent. TPA on  hold per RN due to arvind blood from chest tube requiring PRBC transfusion for hgb 6. Pending chest CT today. Per , spinal stimulator is non-MRI compatible. Levophed 10mcg. 30% Fio2. Afebrile.   4/22: Fever 101.3 this am. WBC 20,300 down from 32,200 yesterday. BAL growing Klebsiella pneumoniae but susceptibility panel not set up until today. CTA of brain shows no lesion. CT of chest shows enlarging cavitary lung lesions bilat and large loculated new left pleural effusion and large hydropneumothorax on right. TPA & dornase infusions of right chest ended 4/20 after considerable bleeding requiring transfusion. Hb now down again to 6.8.  4/23: WBC 23k. Fio2 40%. Tmax 38.1. US of stiumlator shows small fluid collection but no drainable abscess. Pending MICAH today. Pending L chest tube placement  4/24: Continue fever 100.8-101.8. WBC 16,600. MICAH shows large vegetations on both tricuspid valve and mitral valve with mild TR & MR.  No aortic root abscess. Left chest tube placed yesterday yielding 8 ml of old bloody fluid. Bronch today revealed copious thick maroon secretions in distal trachea and both mainstem bronchi. On the right these were obstructive. Remaining off pressors. Last positive blood cultures (MSSA) were 4/16, negative 4/17.  4/25: Fever 100.6 this AM. wBC 13,300. Hb 6.6. CT: right hydropneumothorax increasing, right bronchopleural fistula, mediastinal shift ot the left , multiple cavitary pulmonary nodules, 3.1 cm left basilar mass, splenomegaly. CT of head shows dural thickening over the clivus. Lac+ GNR >100,000 col/ml growing from BAL of 4/24, presumed Klebsiella. Left peural fluid culture negative from 4/23.  4/26: Fever 100.4 last night. WBC 13,500. Hb 7.4. Plts 502,000. ESR >120. UA fairly clear except 30 mg% protein, 2-5 wbc and rare rbc. CXR unchanged except more prominent pulmonary edema. GNR in BAL may be a different species of Klebsiella, and resistant to ampicillin & tmp-sulfa; confirmation  pending.  4/28: Fever 100.8 last night. WBC 27,700. Hb 6.6. Plts 482,000. Creat 0.19. Kleb pneum in BAL on 4/24 is resistant to ampicillin & tmp-sulfa but otherwise sensitive. O2 sat 96% on FIO2 30% now, PEEP 8. Has been re-evaluated by thoracic surgeon, Dr. Ganser, who believes she will not wean without decortication on the right. Surgery anticipated today.  4/29: S/P right thoracoscopy with decortication with cultures NG at 24 hrs.  4/30: Had a bronch due to hypoxia and hypotension. CXR with worsening right hemithorax pulmonary opacities. Bloody mucous plugs removed. Pressors started. Febrile this am. Tachy in 130s. Pressors just removed. Tolerating vent, but not a SBT candidate at the moment.  5/1: Small air leak CT-2 every ~ 2/3 breathes. The K. pneumoniae from her thoracoscopy is sensitive to her cefepime so no antibiotic change needed.   5/2: No air leak today she tolerated 2  hrs of spontaneous breathing with support today.      5/3: Second day with SBT and doing well now for 2 hrs. Slight bump in temperature and      WBC's but no obvious clinical infectious changed so watch closely.      5/4: Fever still from time to time. WBCs trending up. Chest tubes in place. Trach.       5/5: She clearly is better today she was sitting up in bed with open eyes and follows commands. She still has low grade fever but her WBC's are dropping. Cefepime dose increased yesterday for increased CNS coverage if necessary. She will probably need further thoracic surgery as mentioned by Dr. Ganser. The issue of what to do with her stimulator is still up in the air for now as it probably will need to be removed but she is nort a candidate for more major surgery at this time.       5/7: She continues to improve and under go longer SBT trials. She just had a new PICC placed in her right arm and plan is to D/C central line.        5/8: PICC placed. No fevers. Comfortable. Cortrak placed. Failing SBTs.  5/9: No acute issues. No fevers.  Comfortable. Family at bedside.   5/10: No acute issues, fevers or WBC bumps. No changes in condition. She is to get her CT scan today and be reviewed by surgery  5/11:  at bedside.  Has been referred to LANE.  Repeat CT scan completed.  Results noted.  ? If Dr Ganser has seen.  Still with an empyema:  ? If candidate for further surgery.  5/12: Pending next thoracic surgery. No fever. Anxious.  5/13: ? Surgery date.  No one seems to know.  Had speaking valve in and having swallow eval with speech therapy    Interval History:  Past 24 hrs reviewed with RN.    Labs Reviewed, Medications Reviewed, Radiology Reviewed, Wound Reviewed, Fluids Reviewed and GI Nutrition Reviewed    Review of Systems:  Review of Systems   Constitutional: Negative for fever.   Cardiovascular: Negative for chest pain.   Gastrointestinal: Negative for abdominal pain.       Physical Exam:  Physical Exam  Constitutional:       Appearance: Normal appearance.   HENT:      Nose:      Comments: NG  Neck:      Comments: ET  Cardiovascular:      Rate and Rhythm: Normal rate and regular rhythm.   Pulmonary:      Breath sounds: Rhonchi present.      Comments: CT x 2 in place.  Bloody drainage in tubes.  Abdominal:      General: Abdomen is flat.   Skin:     General: Skin is warm and dry.   Neurological:      General: No focal deficit present.      Mental Status: She is alert.         Labs:  Recent Results (from the past 24 hour(s))   CBC with Differential    Collection Time: 05/13/21  4:42 AM   Result Value Ref Range    WBC 10.0 4.8 - 10.8 K/uL    RBC 3.36 (L) 4.20 - 5.40 M/uL    Hemoglobin 9.8 (L) 12.0 - 16.0 g/dL    Hematocrit 32.1 (L) 37.0 - 47.0 %    MCV 95.5 81.4 - 97.8 fL    MCH 29.2 27.0 - 33.0 pg    MCHC 30.5 (L) 33.6 - 35.0 g/dL    RDW 59.7 (H) 35.9 - 50.0 fL    Platelet Count 374 164 - 446 K/uL    MPV 9.2 9.0 - 12.9 fL    Neutrophils-Polys 79.70 (H) 44.00 - 72.00 %    Lymphocytes 11.20 (L) 22.00 - 41.00 %    Monocytes 7.90 0.00 - 13.40 %  "   Eosinophils 0.00 0.00 - 6.90 %    Basophils 0.40 0.00 - 1.80 %    Immature Granulocytes 0.80 0.00 - 0.90 %    Nucleated RBC 0.00 /100 WBC    Neutrophils (Absolute) 7.93 (H) 2.00 - 7.15 K/uL    Lymphs (Absolute) 1.12 1.00 - 4.80 K/uL    Monos (Absolute) 0.79 0.00 - 0.85 K/uL    Eos (Absolute) 0.00 0.00 - 0.51 K/uL    Baso (Absolute) 0.04 0.00 - 0.12 K/uL    Immature Granulocytes (abs) 0.08 0.00 - 0.11 K/uL    NRBC (Absolute) 0.00 K/uL   MAGNESIUM    Collection Time: 05/13/21  4:42 AM   Result Value Ref Range    Magnesium 1.8 1.5 - 2.5 mg/dL   PHOSPHORUS    Collection Time: 05/13/21  4:42 AM   Result Value Ref Range    Phosphorus 3.3 2.5 - 4.5 mg/dL   Comp Metabolic Panel    Collection Time: 05/13/21  4:42 AM   Result Value Ref Range    Sodium 138 135 - 145 mmol/L    Potassium 4.0 3.6 - 5.5 mmol/L    Chloride 103 96 - 112 mmol/L    Co2 28 20 - 33 mmol/L    Anion Gap 7.0 7.0 - 16.0    Glucose 107 (H) 65 - 99 mg/dL    Bun 15 8 - 22 mg/dL    Creatinine 0.22 (L) 0.50 - 1.40 mg/dL    Calcium 9.5 8.5 - 10.5 mg/dL    AST(SGOT) 13 12 - 45 U/L    ALT(SGPT) 16 2 - 50 U/L    Alkaline Phosphatase 741 (H) 30 - 99 U/L    Total Bilirubin 0.6 0.1 - 1.5 mg/dL    Albumin 3.1 (L) 3.2 - 4.9 g/dL    Total Protein 8.1 6.0 - 8.2 g/dL    Globulin 5.0 (H) 1.9 - 3.5 g/dL    A-G Ratio 0.6 g/dL   ESTIMATED GFR    Collection Time: 05/13/21  4:42 AM   Result Value Ref Range    GFR If African American >60 >60 mL/min/1.73 m 2    GFR If Non African American >60 >60 mL/min/1.73 m 2     Results     Procedure Component Value Units Date/Time    BLOOD CULTURE [[id number]] Collected: 05/04/21 1200    Order Status: Completed Specimen: Blood from Peripheral Updated: 05/09/21 1300     Significant Indicator NEG     Source BLD     Site PERIPHERAL     Culture Result No growth after 5 days of incubation.    Narrative:      Collected By:[id number] FERNANDA WISE  Per Hospital Policy: Only change Specimen Src: to \"Line\" if  specified by physician order.  Left " "AC    BLOOD CULTURE [[id number]] Collected: 21 1150    Order Status: Completed Specimen: Blood from Peripheral Updated: 21 1300     Significant Indicator NEG     Source BLD     Site PERIPHERAL     Culture Result No growth after 5 days of incubation.    Narrative:      Collected By:[id number] FERNANDA WISE  Per Hospital Policy: Only change Specimen Src: to \"Line\" if  specified by physician order.  Right Hand        Hemodynamics:  Temp (24hrs), Av.1 °C (96.9 °F), Min:35.6 °C (96 °F), Max:36.5 °C (97.7 °F)  Temperature: (!) 35.6 °C (96 °F)  Pulse  Av.3  Min: 40  Max: 149   Blood Pressure: 110/66    PICC Double Lumen 21 Lumen 1: Purple Lumen 2: Red Right Brachial (Active)   Site Assessment Clean;Dry;Intact 21 08   Lumen 1 Status Blood return noted;Flushed;Normal saline locked;Scrubbed the hub prior to access 21 08   Lumen 2 Status Blood return noted;Flushed;Normal saline locked;Scrubbed the hub prior to access 21 08   Line Secured at (cm) 1 cm 21   Extremity Circumference (cm) 30.5 cm 21 111   Dressing Type Biopatch;Securing device;Skin barrier;Transparent 21 08   Dressing Status Clean;Dry;Intact 21 08   Dressing Intervention N/A 21   Dressing Change Due 05/15/21 05/11/21 2000   Date Primary Tubing Changed 21 08   Date Secondary Tubing Changed 21 08   Date IV Connector(s) Changed 05/15/21 05/11/21 08   NEXT IV Connector(s) Change 21 111   Line Necessity Assessed Antibiotic Therapy Greater than 7 Days 21 0800   $ Double Lumen PICC Charge Single kit used 21 1118     Wound:  @WOUNDLDA(4)@     Fluids:  Intake/Output                             21 - 21 - 21/21 0700 - 21 0659     1900-0659 Total 1900-0659 Total 1900-0659 Total                    Intake    I.V.  150  -- 150  --  " -- --  --  -- --    Magnesium Sulfate Volume 150 -- 150 -- -- -- -- -- --    NG/GT  540  360 900  450  540 990  180  -- 180    Intake (mL) ([REMOVED] Enteral Tube 05/07/21 10 Fr. Right nare) 540 -- 540 -- -- -- -- -- --    Intake (mL) (Enteral Tube 05/11/21 Cortrak - Gastric) -- 360 360 450 540 990 180 -- 180    Total Intake  450 540 990 180 -- 180       Output    Urine  1300  -- 1300  --  400 400  125  -- 125    Number of Times Voided -- -- -- -- 1 x 1 x -- -- --    Urine Void (mL) -- -- -- -- -- -- 125 -- 125    Output (mL) (External Urinary Catheter (Female Wick)) 1300 -- 1300 -- 400 400 -- -- --    Stool  --  -- --  --  -- --  --  -- --    Number of Times Stooled 2 x -- 2 x -- -- -- -- -- --    Emesis/NG output  0  -- 0  --  -- --  --  -- --    Output (mL) ([REMOVED] Enteral Tube 05/07/21 10 Fr. Right nare) 0 -- 0 -- -- -- -- -- --    Chest Tube  20  -- 20  0  50 50  0  -- 0    Output (mL) (Chest Tube 2 Right Midaxillary) 20 -- 20 0 40 40 0 -- 0    Output (mL) (Chest Tube 1 Right Midaxillary;Pleural) 0 -- 0 0 10 10 0 -- 0    Total Output 1320 -- 1320 0 450 450 125 -- 125       Net I/O     -630 360 -270 450 90 540 55 -- 55           GI/Nutrition:  Orders Placed This Encounter   Procedures   • Diet NPO     Standing Status:   Standing     Number of Occurrences:   1     Order Specific Question:   Restrict to:     Answer:   Strict [1]     Medications:  Current Facility-Administered Medications   Medication Last Admin   • ondansetron (ZOFRAN) syringe/vial injection 4 mg 4 mg at 05/13/21 0431   • methadone (DOLOPHINE) 10 MG/ML solution 10 mg 10 mg at 05/13/21 0620   • gabapentin (NEURONTIN) capsule 300 mg 300 mg at 05/13/21 0621   • cloNIDine (CATAPRES) tablet 0.1 mg 0.1 mg at 05/13/21 0622   • oxyCODONE immediate release (ROXICODONE) tablet 20 mg 20 mg at 05/13/21 0920   • traMADol (ULTRAM) 50 MG tablet 50 mg 50 mg at 05/11/21 0320   • cefepime (MAXIPIME) 2 g in  mL IVPB Stopped at 05/13/21 0950   •  labetalol (NORMODYNE/TRANDATE) injection 10 mg 10 mg at 05/04/21 0226   • enoxaparin (LOVENOX) inj 40 mg 40 mg at 05/13/21 0620   • Pharmacy Consult: Enteral tube insertion - review meds/change route/product selection     • acetaminophen (Tylenol) tablet 650 mg 650 mg at 05/12/21 1233   • magnesium hydroxide (MILK OF MAGNESIA) suspension 30 mL      And   • senna-docusate (PERICOLACE or SENOKOT S) 8.6-50 MG per tablet 2 tablet 2 tablet at 05/13/21 0620    And   • polyethylene glycol/lytes (MIRALAX) PACKET 1 Packet 1 Packet at 05/11/21 0525    And   • bisacodyl (DULCOLAX) suppository 10 mg     • divalproex (DEPAKOTE SPRINKLE) capsule 500 mg 500 mg at 05/13/21 0620   • DULoxetine (CYMBALTA) capsule 60 mg 60 mg at 05/13/21 0600   • PARoxetine (PAXIL) tablet 10 mg 10 mg at 05/13/21 0622   • risperiDONE (RISPERDAL) tablet 2 mg 2 mg at 05/13/21 0621   • Respiratory Therapy Consult     • MD Alert...ICU Electrolyte Replacement per Pharmacy     • ipratropium-albuterol (DUONEB) nebulizer solution       Medical Decision Making, by Problem:  Active Hospital Problems    Diagnosis    • *Bacteremia due to methicillin susceptible Staphylococcus aureus (MSSA) [R78.81, B95.61]    • Agitation requiring sedation protocol [R45.1]    • 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]    • Acute respiratory failure with hypoxia (HCC) [J96.01]    • Acute bacterial endocarditis [I33.0]    • Empyema of right pleural space (HCC) [J86.9]    • Acute encephalopathy [G93.40]    • Fever [R50.9]    • Pleural effusion, left [J90]    • Atelectasis [J98.11]    • CSF pleocytosis [D72.9]    • Pneumonia [J18.9]    • Tachycardia [R00.0]    • Pseudotumor cerebri [G93.2]    • S/P  shunt [Z98.2]    • Prolonged Q-T interval on ECG [R94.31]    • History of vasculitis [Z86.79]    • History of complicated migraines [G43.109]    • Hyponatremia [E87.1]    • H/O: CVA (cerebrovascular accident) [Z86.73]    • Chronic pain syndrome [G89.4]    • Thrombocytopenia (HCC) [D69.6]    • Anxiety  [F41.9]    • Spinal cord stimulator status [Z96.89]    • Goals of care, counseling/discussion [Z71.89]    • Abnormal movement [G25.9]    • Pulmonary abscess (HCC) [J85.2]    • Anasarca [R60.1]    • Thrombocytosis (HCC) [D47.3]    • GERD (gastroesophageal reflux disease) [K21.9]    • Septic shock (HCC) [A41.9, R65.21]    • Anemia [D64.9]    • Hypokalemia [E87.6]      Impression   -Severe sepsis  -Catheter related bloodstream infection due to infected port status post removal 4/12-staph aureus  -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       - Acute left empyema s/p chest tube placement 4/23  -Multiple acute septic pulmonary emboli bilaterally  -Acute bilateral pneumonia due to above     --Pulmonary edema  -Pseudotumor cerebri with underlying  shunt: possible arachnoiditis  -Chronic migraine headaches  -History of autoimmune vasculitis  -Elevated alkaline phosphatase & bilirubin  -Acute encephalopathy due to sepsis      - anemia due to above      - New secondary Klebsiella pneumoniae pneumonia (presumed VAP)     Plan   - Continue cefepime - clinical endpoint - Empyema needs clearance.   - Await surgery decision for further operation time  - Vent management per pulmonology - SBT trials  - Tolerated the decortication. Cx NGTD.   - No IV abx changes or further ID workup in the moment  - Continue to monitor closely  - Improving overall.   - Current abx covering CNS and back hardware.    - This HW is not coming out anytime soon per NS.  - No ID changes today   - Dispo:          - Has been referred to LANE. Needs to have her next decortication/thorascopy before consideration is made to transfer to LTAC.    Case and treatment reviewed with patient(best possible)    > 35 min on floor in ICU with > 50% face to face view and 100% in direct patient care/counseling today.

## 2021-05-13 NOTE — THERAPY
Speech Language Pathology   Clinical Swallow Evaluation     Patient Name: Enedelia Pang  AGE:  50 y.o., SEX:  female  Medical Record #: [id number]  Today's Date: 5/13/2021     Precautions  Precautions: (P) Fall Risk, Tracheostomy , Nasogastric Tube  Comments: (P) Chest tube x2    Assessment    Patient seen for a blue dye swallow evaluation on this date.  She was up to a chair and sleeping upon entering the room.  Patient easily aroused with min verbal cues.  Cuff deflated upon arrival.  She tolerated placement of the speaking valve with stable vital signs (, SpO2 93-98% on 7L O2, 35% FiO2).  Patient produced weak voicing with primarily whispered speech at the single word level.  Patient appears more lethargic today as compared to prior session.  She followed directives to the oral [facility] exam with diffuse weakness noted.  Presentation of PO included single blue dyed ice chips x15.  The patient presented with timely initiation of swallow trigger and weak laryngeal elevation upon palpation.  Patient maintained clear vocal quality throughout the session with no overt s/sx of aspiration noted.  Tracheal suctioning not completed during the session due to increased bleeding with suctioning.  RT aware and in agreement.  Please monitor tracheal secretions over the next 24 hours and document the presence of blue.  Patient would benefit from continued PO trials to further assess swallowing function. Continue NPO with tube feeding.    Speaking valve doffed at the conclusion of the session (after 25 minutes) due to increased lethargy.       Plan    Recommend Speech Therapy 3 times per week until therapy goals are met for the following treatments:  Dysphagia Training, Expression Training and Patient / Family / Caregiver Education.    Discharge Recommendations: (P) Recommend post-acute placement for additional speech therapy services prior to discharge home    Subjective    Pleasant and agreeable.      Objective        "05/13/21 1132   Oral Motor Eval    Is Patient Able to Complete Oral Motor Eval Yes but Impaired   Labial Function   Labial Structure At Rest Within Functional Limits   Lingual Function   Lingual Structure At Rest Within Functional Limits   Lingual Protrude Minimal   Lingual Retract Minimal   Elevate Outside Mouth Minimal   Lateralization No Impairment Right;No Impairment Left   Lick Lips (Circular) Minimal   / Pa / 5X's Minimal   / Ta / 5X's Minimal   / Ka / 5X's Minimal   / [place] / 5X's Minimal   Jaw   Jaw Structure At Rest Within Functional Limits   Jaw Open / Resist Within Functional Limits   Jaw Close / Resist Within Functional Limits   Laryngeal Function   Voice Quality Moderate   Volutional Cough Minimal   Excursion Upon Swallow Weak    Oral Food Presentation   Ice Chips Within Functional Limits   Tracheostomy   Tracheostomy  Yes   Type of Airway Standard Cuffed Trach   Size of Airway 8.0   Tracheostomy Cuff Deflated   Speaking Valve Placement Yes   Respiratory Support T - Piece   Blue Dye Test In Progress, Monitor for 24 Hours   Dysphagia Strategies / Recommendations   Strategies / Interventions Recommended (Yes / No) Yes   Compensatory Strategies To Be Assessed   Diet / Liquid Recommendation NPO;Pre-Feeding Trials with SLP Only   Medication Administration  Via Gastric Tube   Therapy Interventions Dysphagia Therapy By Speech Language Pathologist   Dysphagia Rating   Nutritional Liquid Intake Rating Scale Nothing by mouth   Nutritional Food Intake Rating Scale Nothing by mouth   Patient / Family Goals   Patient / Family Goal #1 \"I can breathe better\"   Short Term Goals   Short Term Goal # 1 The patient will tolerate the speaking valve while awake with no s/sx of emotional or respiratory distress   Goal Outcome # 1 Progressing as expected   Short Term Goal # 2 Pt will consume prefeeding trials with SLP only, given no overt s/sx of aspiration.          "

## 2021-05-13 NOTE — PROGRESS NOTES
[facility] Medicine Daily Progress Note    Date of Service  5/13/2021    Chief Complaint  50 y.o. female admitted 4/16/2021 with empyema with MSSA, complicated by respiratory failure and thoracic surgery consultation, transferred from an outlying facility namely [facility]    Hospital Course  This is a 50-year-old female with a past me history of pseudotumor cerebri s/p  shunt implantation by Dr. Oh neurosurgery, chronic pain syndrome with a history of placement of a nerve stimulator, vasculitis, right anterior chest port for home IV medication administration, recurrent infections related to port, presented on 4/11 to [facility] with recurrent port infection was initially treated with vancomycin and Zosyn and then changed to ceftaroline subsequently switched to nafcillin, MSSA identified.  Serum infectious disease is managing antibiotics for the patient.  Dr. Candelaria surgery removed the port on 4/12.  The patient was further identified to have endocarditis of the tricuspid valve, a lumbar puncture performed on 4/14 showed pleocytosis with negative Gram stain.  The patient suffered worsening leukocytosis and was found to have septic emboli per CT.  Initially a small pleural catheter was placed.  Significant loculations were encountered.  MSSA empyema was diagnosed.  Neurosurgery was consulted to comment on possibly removal of a  shunt, this was felt not appropriate at this time.  The patient remained on mechanical ventilation for 15 days, then a perc trach was placed.  The patient was eventually liberated from mechanical ventilation, the patient did have a right thoracoscopy and decortication of the lung performed on 4/28 by Dr. Ganser.  The patient was of identified to have multiple sources for MSSA including mitral valve, tricuspid valve vegetation, port, spinal stimulator,  shunt possibly.  The patient is slowly improving, she is significant chronic pain issues,  it was determined that her loculated pleural fluid is returning and the patient will need more extensive thoracotomy/pleurodesis on 5/17 with Dr. Ganser.  The patient was referred to LTAC pending additional surgical interventions to be completed    Interval Problem Update  Patient seen and examined today.    Patient tolerating treatment and therapies.  All Data, Medication data reviewed.  Case discussed with nursing as available.  Plan of Care reviewed with patient and notified of changes.  5/13 the patient appears significantly lethargic, possible medication effects, she is able to awaken and respond some, she is complaining of back pain, afebrile, heart rate in the 1 teens to 130s, respiration in the 15 range unlabored, the patient is on 7 L T-piece, 96%, blood pressure 108/64, improving anemia hemoglobin 9.8, chemistry fairly unremarkable except for alk phos of 741 which is improving,    Consultants/Specialty  Critical care/pulmonary  Infectious disease  Thoracic surgery  Palliative care    Code Status  Full Code    Disposition  Will likely postacute treatment at an LTAC level    Review of Systems  Review of Systems   Unable to perform ROS: Mental acuity        Physical Exam  Temp:  [35.6 °C (96 °F)-36.5 °C (97.7 °F)] 36.4 °C (97.5 °F)  Pulse:  [] 112  Resp:  [11-32] 11  BP: ()/(53-90) 108/64  SpO2:  [90 %-97 %] 96 %    Physical Exam  Vitals and nursing note reviewed.   Constitutional:       Appearance: She is well-developed and normal weight. She is ill-appearing. She is not diaphoretic.      Interventions: Cervical collar in place.      Comments: Lethargic, chronically ill-appearing female, looks much older than stated age   HENT:      Head: Normocephalic and atraumatic.      Nose: Nose normal.      Comments: Core track     Mouth/Throat:      Mouth: Mucous membranes are dry.   Eyes:      Conjunctiva/sclera: Conjunctivae normal.      Pupils: Pupils are equal, round, and reactive to light.   Neck:       Thyroid: No thyromegaly.      Vascular: No JVD.      Comments: Tracheostomy  Cardiovascular:      Rate and Rhythm: Normal rate and regular rhythm.      Heart sounds: Normal heart sounds. No friction rub. No gallop.    Pulmonary:      Effort: Pulmonary effort is normal.      Breath sounds: Rhonchi and rales present. No wheezing.      Comments: 2 chest tubes in place with serosanguineous fluid  Abdominal:      General: Bowel sounds are normal. There is no distension.      Palpations: Abdomen is soft. There is no mass.      Tenderness: There is no abdominal tenderness. There is no guarding or rebound.   Musculoskeletal:         General: No tenderness. Normal range of motion.      Cervical back: Normal range of motion and neck supple.   Lymphadenopathy:      Cervical: No cervical adenopathy.   Skin:     General: Skin is warm and dry.   Neurological:      General: No focal deficit present.      Mental Status: She is lethargic and disoriented.      Cranial Nerves: No cranial nerve deficit.      Motor: Weakness present.         Fluids    Intake/Output Summary (Last 24 hours) at 5/13/2021 1642  Last data filed at 5/13/2021 1600  Gross per 24 hour   Intake 945 ml   Output 575 ml   Net 370 ml       Laboratory  Recent Labs     05/11/21  0315 05/12/21  0420 05/13/21  0442   WBC 8.8 10.5 10.0   RBC 2.97* 3.14* 3.36*   HEMOGLOBIN 8.8* 9.1* 9.8*   HEMATOCRIT 28.6* 30.1* 32.1*   MCV 96.3 95.9 95.5   MCH 29.6 29.0 29.2   MCHC 30.8* 30.2* 30.5*   RDW 62.4* 60.4* 59.7*   PLATELETCT 342 356 374   MPV 9.2 9.5 9.2     Recent Labs     05/11/21  0315 05/12/21  0420 05/13/21  0442   SODIUM 136 135 138   POTASSIUM 4.0 3.4* 4.0   CHLORIDE 102 100 103   CO2 28 29 28   GLUCOSE 109* 108* 107*   BUN 15 13 15   CREATININE <0.17* 0.17* 0.22*   CALCIUM 9.0 8.9 9.5                   Imaging  DX-CHEST-PORTABLE (1 VIEW)   Final Result         1.  Hazy right pulmonary infiltrates and/or effusion, similar compared to prior study.      DX-ABDOMEN FOR  TUBE PLACEMENT   Final Result         1.  Nonspecific bowel gas pattern.   2.  Dobbhoff tube tip terminates overlying the expected location of the gastric antrum.   3.  Hazy bilateral lung base infiltrates, greater on the right.      DX-CHEST-PORTABLE (1 VIEW)   Final Result         1.  Hazy right pulmonary infiltrates and/or effusion, similar compared to prior study.      US-RUQ   Final Result         1.  Hepatomegaly   2.  Mild intrahepatic and extrahepatic biliary ductal dilatation, commonly associated with postcholecystectomy physiology, consider causes of biliary obstruction with additional workup as clinically appropriate      CT-CHEST,ABDOMEN,PELVIS WITH   Final Result      1.  Large empyema in the RIGHT hemithorax, slightly decreased in size from prior exam with chest tubes present.   2.  Consolidation remains.   3.  Multiple cavitary lesions again seen in the LEFT upper lobe, minimally decreased from prior, concerning for septic emboli.   4.  Supportive tubing is unchanged.   5.  Intrahepatic and extrahepatic biliary dilation, likely postoperative although distal stricture, stone or mass remain possibilities.   6.  Moderate pelvic fluid, nonspecific.   7.  No evidence of bowel obstruction or perforation.      DX-CHEST-PORTABLE (1 VIEW)   Final Result         1.  Hazy right pulmonary infiltrates and/or effusion, similar compared to prior study.      DX-CHEST-PORTABLE (1 VIEW)   Final Result         No significant interval change.            DX-CHEST-PORTABLE (1 VIEW)   Final Result         Interval removal of left central venous catheter. Interval adjustment of right PICC with tip in the SVC.         DX-ABDOMEN FOR TUBE PLACEMENT   Final Result      Feeding tube tip at the mid to distal stomach.      IR-PICC LINE PLACEMENT W/ GUIDANCE > AGE 5   Final Result                  Ultrasound-guided PICC placement performed by qualified nursing staff as    above.          DX-CHEST-PORTABLE (1 VIEW)   Final Result       1.  Stable cardiopulmonary findings.   2.  See comments above regarding the right-sided PICC position.      DX-CHEST-PORTABLE (1 VIEW)   Final Result      Stable chest findings compared to 5/5.      DX-CHEST-PORTABLE (1 VIEW)   Final Result      Stable chest findings compared with 5/3.      DX-ABDOMEN FOR TUBE PLACEMENT   Final Result         Feeding tube with tip projecting over the expected area of the stomach.      DX-CHEST-PORTABLE (1 VIEW)   Final Result      Stable chest findings compared with prior.      DX-CHEST-PORTABLE (1 VIEW)   Final Result         1. No significant interval change.      US-EXTREMITY ARTERY LOWER UNILAT RIGHT   Final Result      DX-CHEST-PORTABLE (1 VIEW)   Final Result         1. No significant interval change.      IR-PICC LINE PLACEMENT W/ GUIDANCE > AGE 5   Final Result                  Ultrasound-guided PICC placement performed by qualified nursing staff as    above.          DX-CHEST-PORTABLE (1 VIEW)   Final Result         1.  Pulmonary edema and/or infiltrates, similar to prior study.   2.  Stable opacification right lung, likely effusion. Thoracostomy tubes are in place.   3.  Multiple cavitary appearing left pulmonary lesions, see CT performed April 27, 2021 for further characterization      DX-CHEST-PORTABLE (1 VIEW)   Final Result         1.  Pulmonary edema and/or infiltrates, similar to prior study.   2.  Single opacification right lung, likely effusion. Thoracostomy tubes are in place.   3.  Multiple cavitary appearing left pulmonary lesions, see CT performed April 27, 2021 for further characterization   4.  Soft tissue gas in the right chest wall      DX-CHEST-PORTABLE (1 VIEW)   Final Result         1.  Pulmonary edema and/or infiltrates, similar to prior study.   2.  Increased opacification right lung, likely increased effusion. Thoracostomy tubes are in place.   3.  Multiple cavitary appearing left pulmonary lesions, see CT performed April 27, 2021 for further  characterization   4.  Soft tissue gas in the right chest wall      DX-CHEST-PORTABLE (1 VIEW)   Final Result         1.  Pulmonary edema and/or infiltrates, similar to prior study.   2.  Right pneumothorax, stable compared to prior study, right thoracostomy tubes remain in place   3.  Multiple cavitary appearing left pulmonary lesions, see CT performed April 27, 2021 for further characterization   4.  Soft tissue gas in the right chest wall      DX-CHEST-PORTABLE (1 VIEW)   Final Result         1.  Pulmonary edema and/or infiltrates, similar to prior study.   2.  Right pneumothorax, interval decreased compared to prior study, interval placement of right thoracostomy tubes is noted.   3.  Multiple cavitary appearing left pulmonary lesions, see CT performed yesterday for further characterization   4.  Soft tissue gas in the right chest wall      DX-CHEST-PORTABLE (1 VIEW)   Final Result         1.  Pulmonary edema and/or infiltrates, similar to prior study.   2.  Right pneumothorax, stable compared to prior study, interval removal of right thoracostomy tube is noted.   3.  Multiple cavitary appearing left pulmonary lesions, see CT performed yesterday for further characterization   4.  Soft tissue gas in the right chest wall      DX-CHEST-LIMITED (1 VIEW)   Final Result         No significant interval change.      CT-CTA CHEST PULMONARY ARTERY W/ RECONS   Final Result         No CT evidence of pulmonary embolus.      Previous right chest tube were removed.      Grossly similar in size of the loculated right hydropneumothorax but there is increased layering fluid component. Unchanged mild shift of the mediastinal to the left.      Redemonstration of a pigtail catheter in the medial left lung base. Grossly similar multiple cavitary lesions in the left lung.      US-EXTREMITY VENOUS LOWER BILAT   Final Result      POCT BUTTERFLY-DUPLEX SCAN EXTREMITY VEINS LIMITED   Final Result      DX-CHEST-PORTABLE (1 VIEW)   Final  Result         1.  Pulmonary edema and/or infiltrates, similar to prior study.   2.  Right pneumothorax, somewhat decreased to prior study, interval removal of right thoracostomy tube is noted.   3.  Multiple cavitary appearing left pulmonary lesions, see CT performed yesterday for further characterization   4.  Soft tissue gas in the right chest wall      DX-CHEST-PORTABLE (1 VIEW)   Final Result         1.  Pulmonary edema and/or infiltrates, similar to prior study.   2.  Right pneumothorax, similar to prior study with thoracostomy tube in place.   3.  Multiple cavitary appearing left pulmonary lesions, see CT performed yesterday for further characterization      CT-CHEST (THORAX) W/O   Final Result      Large loculated right hydropneumothorax with interval increase in size of the pneumothorax and pleural fluid.      Right chest tube is in the posterior right thorax.      There is mediastinal shift to the left compatible with tension.      Small pericardial effusion.      Small loculated left pleural effusion with overlying atelectasis/consolidation.   Pigtail catheter is seen at the medial left lung base. Pleural effusion has decreased in size compared to prior.      There are multiple foci of air extending from the right lung to the pleural space suspicious for a bronchopleural fistula.      Multiple cavitary lesions bilaterally with mild interval decrease of the solid component of the cavitary nodules.      Noncavitary 3.1 cm masslike density is seen at the left lung base.      Findings may be related to septic emboli, malignancy or multifocal abscesses. Short interval follow-up recommended.      Soft tissue air on the right is again seen.      Splenomegaly      Findings discussed with Leti Sun.      DX-CHEST-PORTABLE (1 VIEW)   Final Result      No significant change from prior exam.      CT-HEAD W/O   Final Result      1.  RIGHT frontal ventriculostomy catheter unchanged in position.   2.  Mild cortical  atrophy.   3.  No intracranial hemorrhage.   4.  Apparent dural thickening overlying the clivus.  Consider further evaluation with contrast-enhanced MRI.      DX-CHEST-LIMITED (1 VIEW)   Final Result      Interval left basilar pigtail catheter with diminished atelectasis, pleural fluid      Stable right hydropneumothorax with thoracostomy tube in place, considerable soft tissue gas and partial lung collapse      IR-CHEST TUBE-EMPYEMA LEFT   Final Result      1.  CT GUIDED LEFT  10 [place] PIGTAIL CHEST TUBE PLACEMENT FOR POSSIBLE EMPYEMA YIELDING OLD BLOODY FLUID.      2. A CHEST RADIOGRAPH IS FORTHCOMING.      EC-MICAH W/O CONT   Final Result      US-ABDOMEN LTD (SOFT TISSUE)   Final Result      No fluid seen surrounding the electronic implanted spinal stimulator device.      DX-CHEST-LIMITED (1 VIEW)   Final Result      1.  Large right hydropneumothorax with right-sided chest tube in place, likely stable to slightly decreased from prior study.   2.  Small left pleural effusion. Mild improved aeration in the left lung.   3.  Bilateral pulmonary opacities which could be related to combination of atelectasis, edema and/or infection..      CT-CTA HEAD WITH & W/O-POST PROCESS   Final Result      1.  Patent carotid and vertebral arteries.      2.  Again seen right frontal the jugular peritoneal shunt catheter. There is mild increase in volume of the lateral and third ventricles.      CT-CHEST (THORAX) W/O   Final Result      1.  Interval placement of a right-sided chest tube into a large loculated right-sided pleural effusion. There is now seen a large right-sided hydropneumothorax in the area that previously seen fluid. The chest tube extends into that hydropneumothorax.    There is some residual pleural fluid seen as well. A hydropneumothorax is somewhat loculated with components most prominently inferiorly and medially.      2.  New loculated left pleural effusion.      3.  Again seen multiple bilateral cavitary  pulmonary masses which are more prominent than previously seen with increasing cavitation and measure up to 3 cm size. Differential diagnosis includes septic emboli, multifocal abscesses and neoplasm.      4.  Large amount of subcutaneous emphysema on the right.      5.  Splenomegaly.      6.  Multiple support devices.      Fleischner Society pulmonary nodule recommendations:         DX-CHEST-LIMITED (1 VIEW)   Final Result      1.  Support devices as described above.      2.  Right chest tube present with a again seen right-sided pneumothorax, possibly increased in size and loculated.      3.  Worsening bilateral pulmonary infiltrates.      DX-ABDOMEN FOR TUBE PLACEMENT   Final Result      Cortrak feeding tube tip projects in the region of the duodenal bulb.      DX-CHEST-LIMITED (1 VIEW)   Final Result      Loculated right pneumothorax is decreased in size compared to prior.      Small left pleural effusion.      Small loculated right pleural effusion.      Extensive bilateral airspace opacities are not significantly changed.      Soft tissue air on the right is again noted.      DX-CHEST-LIMITED (1 VIEW)   Final Result      Decreased partially loculated right pleural fluid with increased pleural air. Right chest tube is in place.      Increased hazy opacity in the left lung possibly atelectasis.         US-ABDOMEN LTD (SOFT TISSUE)   Final Result      No drainable fluid collection.      DX-CHEST-PORTABLE (1 VIEW)   Final Result      Decreased partially loculated right pleural fluid with increased pleural air. Right chest tube is in place.      No other significant change.      DX-CHEST-PORTABLE (1 VIEW)   Final Result         1.  Pulmonary edema and/or infiltrates are identified, which are somewhat decreased since the prior exam.   2.  Moderate loculated appearing right pleural effusion, slightly decreased since prior      DX-ABDOMEN FOR TUBE PLACEMENT   Final Result      Feeding tube tip at the distal stomach.       DX-CHEST-PORTABLE (1 VIEW)   Final Result         1.  New chest tube is noted in the right lower hemithorax.      2.  Right pleural effusion is again identified which appears similar to the prior exam.      3.  No new infiltrates or consolidations are identified.      DX-CHEST-PORTABLE (1 VIEW)   Final Result         1.  Pulmonary edema and/or infiltrates are identified, which are stable since the prior exam.   2.  Moderate loculated appearing right pleural effusion      DX-CHEST-PORTABLE (1 VIEW)   Final Result         No significant interval change.      POCT BUTTERFLY-ECHOCARDIOGRAM LTD W/O CONT    (Results Pending)        Assessment/Plan  * Bacteremia due to methicillin susceptible Staphylococcus aureus (MSSA)- (present on admission)  Assessment & Plan  Infectious disease assisting with antibiotic treatment  Multiple potential sources  Endocarditis  Need to achieve source control    Debility  Assessment & Plan  Acute on chronic, the patient likely need of LTAC treatment post acute treatment    Elevated alkaline phosphatase level- (present on admission)  Assessment & Plan  The patient not able to tolerate a MRCP at this time, ultrasound grossly unremarkable, observe    Agitation requiring sedation protocol- (present on admission)  Assessment & Plan  Monitor,    Goals of care, counseling/discussion  Assessment & Plan  Patient with overall high risk of morbidity and mortality given her gravely ill state and prior chronic medical conditions  Palliative care assisting    Spinal cord stimulator status  Assessment & Plan  Patient's stimulator is [employer]. It is FDA approved as MRI compatible, but the company has gone out of business, so there is no support team to assist with MRI.  Thus, if MRI were performed, our radiologists would need to protocol it correctly to manage the stimulator. The former rep from [employer] is Andre, [phone].  Information obtained from Dr. Mahoney's office, [phone].     Per  raimundo (5/4/2021), it is not MRI compatible unfortunately.     Anasarca  Assessment & Plan  Improved nutritional status, skin protective measures    Trapped lung  Assessment & Plan  With need for further thoracotomy, decortication    Pulmonary abscess (HCC)  Assessment & Plan  From septic emboli, antibiotic treatment, CT follow-up    Abnormal movement  Assessment & Plan  Apparently tardive dyskinesia, avoid offending agents    Fever  Assessment & Plan  Resolved, monitor, antibiotic therapy    Atelectasis  Assessment & Plan  Pulmonary recruiting, respiratory care    Pneumonia  Assessment & Plan  MSSA, ongoing treatment    History of vasculitis- (present on admission)  Assessment & Plan  Does not appear to be a current issue    CSF pleocytosis- (present on admission)  Assessment & Plan  Infectious disease managing    Empyema of right pleural space (HCC)- (present on admission)  Assessment & Plan  Patient s/p multiple chest tubes, thoracotomy and decortication  With recurrence unfortunately  Will need additional thoracic surgery with Dr. Ganser on 5/17    Acute bacterial endocarditis- (present on admission)  Assessment & Plan  Mitral valve, tricuspid valve, ongoing antibiotic therapy, source control    Acute blood loss anemia  Assessment & Plan  Likely secondary to hemothorax  300 cc of blood removed after chest tube placed at [facility]  Trend hemoglobin    Prolonged Q-T interval on ECG- (present on admission)  Assessment & Plan  Avoid medication affecting QTC, monitor    Acute respiratory failure with hypoxia (HCC)- (present on admission)  Assessment & Plan  Secondary to MSSA bacteremia, empyema, pulmonary septic emboli  S/p tracheostomy  Ongoing respiratory care, pulmonary toilet, antibiotic treatment  Prolonged ventilator dependence    History of complicated migraines- (present on admission)  Assessment & Plan  Monitor    GERD (gastroesophageal reflux disease)- (present on admission)  Assessment &  Plan  History of, as needed treatment    Hyponatremia  Assessment & Plan  Monitor    Tachycardia  Assessment & Plan  Persistent, likely secondary with multiple underlying ongoing conditions  Monitor    Pseudotumor cerebri  Assessment & Plan  History of  shunt, neurosurgery opted against removal    H/O: CVA (cerebrovascular accident)- (present on admission)  Assessment & Plan  Monitor    Chronic pain syndrome- (present on admission)  Assessment & Plan  Pre-existing, pain management    Thrombocytopenia (HCC)- (present on admission)  Assessment & Plan  Transient, resolved    Anemia  Assessment & Plan  Monitor, transfuse as indicated    Septic shock (HCC)- (present on admission)  Assessment & Plan  Multiple sources, recovered with aggressive treatment, IV antibiotics long-term    Sepsis (HCC)  Assessment & Plan  Multiple sources possible, recovered with medical treatment    Hypomagnesemia  Assessment & Plan  Monitor, replace and recheck    Hypokalemia  Assessment & Plan  Monitor, replace and recheck    Anxiety- (present on admission)  Assessment & Plan  Likely acute on chronic, monitor    S/P  shunt- (present on admission)  Assessment & Plan  History of, Dr. Oh felt there is currently no need to remove    Acute encephalopathy- (present on admission)  Assessment & Plan  Per neurosurgery no need to remove  shunt  Improving with ongoing medical treatment    Plan  Continue antibiotic therapy with the help of infectious disease  Continue prior psychiatric medication regimen  Pain control, multimodality  Will reduce scheduled medication  Continue with rehab efforts  Await additional surgery on Monday  See orders  Medically complex high risk patient  Overall appears prognostically poor given her widespread infection, debility, lack of infection source control at this time    VTE prophylaxis: Lovenox    I have performed a physical exam and reviewed and updated ROS and Plan today . In review of yesterday's note , there  are no changes except as documented above.        Please note that this dictation was created using voice recognition software. I have made every reasonable attempt to correct obvious errors, but I expect that there are errors of grammar and possibly context that I did not discover before finalizing the note.

## 2021-05-13 NOTE — CARE PLAN
Problem: Communication  Goal: The ability to communicate needs accurately and effectively will improve  Outcome: Progressing  Note: Communicates with white board     Problem: Pain Management  Goal: Pain level will decrease to patient's comfort goal  Outcome: Progressing  Note: Managed with scheduled pain meds

## 2021-05-13 NOTE — PROGRESS NOTES
"Critical Care Progress Note    Date of admission  4/16/2021    Chief Complaint  \"50 y.o. female the past medical history of pseudotumor cerebri status post  shunt by Dr. Oh, chronic pain with history of placement of nerve stimulator, vasculitis, right anterior chest port for home IV meds with recurrent infection who presented 4/11/2021 with to [facility], [place] with recurrent port infection. Found to have MSSA bacteremia, endocarditis, septic pulmonary emboli/empyema/pneumatocele, and CSF pleocytosis concerning for  shunt involvement.  Seen by Dr. Oh, NSGY, at [facility], [place] who did not recommend  shunt removal.  Seen by Dr. Vargas here for concern of fluid collection around her spinal stimulator and he recommended against removal. Remains intubated, encephalopathic.\"     Hospital Course  4/16 admitted to ICU  4/17 VD 2, 2 FFP, pRBC overnight; new chest tube per gen surg  4/18 VD 3, TPa/Dornase with 1400 cc from R chest tube  4/26 R chest tube not functioning, d/c it.  broke his leg and going to OR today, so not available currently.  VD 11.  8/30%. RSBI immediately high on SBT attempt. Followed commands for RN  4/27 VD12. 8/30%. Not tolerating wean, high RSBI and tachycardic.   4/28 VD 13. VATS with Dr. Ganser.    4/29 VD 14. 8/30%. Still not tolerating wean d/t severe tachycardia just with SAT. Trialed precedex and she required fentanyl up to 600/hr to tolerate, so back on propofol. Chest tubes -40  4/30 VD 15. 8/30%. Try ketamine/versed as sedative. Severe tachycardia and HTN with weaning down propofol. Plan for perc trach tomorrow. Chest tubes -40  5/1 perc trach. PICC placed. Chest tubes remain -40.  5/2 Able to spont well on sedation, but very tachy/HTN when sedation is turned down.  5/3 midazolam weaned down to 3mg/min  5/4 off versed and ketamine gtt  5/5 trials of SBT w/ trach  5/6 T piece trial during day, mobilize to chair x 2, rest vent 8/8 at night, started methadone, gabapentin   5/7 T piece during day " with, rest PS 8/8 overnight, spacing out dilaudid PRNs  5/8 T-piece during day, still with anxiety weaning dex gtt  5/10 CT chest  5/11 will require thoracotomy and decortication  5/12 off ventilator x 24 hours  5/13 replete magnesium, remains on T-piece, ambulating, transfer to med/surg    Review of Systems  Review of Systems   Constitutional: Positive for malaise/fatigue. Negative for fever.   HENT: Negative for congestion.    Eyes: Negative for photophobia.   Cardiovascular: Negative for chest pain.   Gastrointestinal: Negative for abdominal pain.   Skin: Negative for itching.   All other systems reviewed and are negative.       Vital Signs for last 24 hours   Temp:  [35.6 °C (96 °F)-36.5 °C (97.7 °F)] 35.6 °C (96 °F)  Pulse:  [] 143  Resp:  [9-28] 17  BP: ()/(51-93) 123/72  SpO2:  [94 %-97 %] 94 %    Hemodynamic parameters for last 24 hours       Respiratory Information for the last 24 hours       Physical Exam   Physical Exam  Constitutional:       Appearance: She is ill-appearing.   HENT:      Head: Normocephalic and atraumatic.      Right Ear: External ear normal.      Left Ear: External ear normal.      Nose: Nose normal.      Mouth/Throat:      Mouth: Mucous membranes are moist.      Pharynx: Oropharynx is clear.   Eyes:      Extraocular Movements: Extraocular movements intact.      Pupils: Pupils are equal, round, and reactive to light.   Cardiovascular:      Rate and Rhythm: Regular rhythm. Tachycardia present.      Pulses: Normal pulses.      Heart sounds: Normal heart sounds.   Pulmonary:      Breath sounds: Examination of the right-upper field reveals decreased breath sounds. Examination of the right-middle field reveals decreased breath sounds. Examination of the right-lower field reveals decreased breath sounds. Decreased breath sounds present.   Abdominal:      Palpations: Abdomen is soft.   Musculoskeletal:         General: Normal range of motion.      Cervical back: Normal range of  motion and neck supple.   Skin:     General: Skin is warm and dry.      Capillary Refill: Capillary refill takes less than 2 seconds.   Neurological:      Mental Status: She is alert and oriented to person, place, and time.         Medications  Current Facility-Administered Medications   Medication Dose Route Frequency Provider Last Rate Last Admin   • ondansetron (ZOFRAN) syringe/vial injection 4 mg  4 mg Intravenous Q4HRS PRN Man Wahl M.D.   4 mg at 05/13/21 0431   • magnesium sulfate IVPB premix 2 g  2 g Intravenous Once Jeremy M Gonda, M.D. 25 mL/hr at 05/13/21 0920 2 g at 05/13/21 0920   • methadone (DOLOPHINE) 10 MG/ML solution 10 mg  10 mg Enteral Tube Q8HRS Jeremy M Gonda, M.D.   10 mg at 05/13/21 0620   • gabapentin (NEURONTIN) capsule 300 mg  300 mg Enteral Tube Q8HRS Juventino Lozano M.D.   300 mg at 05/13/21 0621   • cloNIDine (CATAPRES) tablet 0.1 mg  0.1 mg Enteral Tube Q8HRS Juventino Lozano M.D.   0.1 mg at 05/13/21 0622   • oxyCODONE immediate release (ROXICODONE) tablet 20 mg  20 mg Enteral Tube Q4HRS Jonathan Upton D.O.   20 mg at 05/13/21 0920   • traMADol (ULTRAM) 50 MG tablet 50 mg  50 mg Enteral Tube Q6HRS PRN Jonathan Upton D.O.   50 mg at 05/11/21 0320   • cefepime (MAXIPIME) 2 g in  mL IVPB  2 g Intravenous Q8HRS Eric Lowery M.D.   Stopped at 05/13/21 0950   • labetalol (NORMODYNE/TRANDATE) injection 10 mg  10 mg Intravenous Q4HRS PRN Leti Sun M.D.   10 mg at 05/04/21 0226   • enoxaparin (LOVENOX) inj 40 mg  40 mg Subcutaneous DAILY Benoit Loya Jr., D.O.   40 mg at 05/13/21 0620   • Pharmacy Consult: Enteral tube insertion - review meds/change route/product selection  1 Each Other PHARMACY TO DOSE Elliott Salvador M.D.       • acetaminophen (Tylenol) tablet 650 mg  650 mg Enteral Tube Q4HRS PRN Elliott Salvador M.D.   650 mg at 05/12/21 1233   • magnesium hydroxide (MILK OF MAGNESIA) suspension 30 mL  30 mL Enteral Tube QDAY PRN Elliott Salvador M.D.         And   • senna-docusate (PERICOLACE or SENOKOT S) 8.6-50 MG per tablet 2 tablet  2 tablet Enteral Tube BID Elliott Salvador M.D.   2 tablet at 05/13/21 0620    And   • polyethylene glycol/lytes (MIRALAX) PACKET 1 Packet  1 Packet Enteral Tube QDAY PRN Elliott Salvador M.D.   1 Packet at 05/11/21 0525    And   • bisacodyl (DULCOLAX) suppository 10 mg  10 mg Rectal QDAY PRN Elliott Salvador M.D.       • divalproex (DEPAKOTE SPRINKLE) capsule 500 mg  500 mg Enteral Tube Q8HRS Elliott Salvador M.D.   500 mg at 05/13/21 0620   • DULoxetine (CYMBALTA) capsule 60 mg  60 mg Enteral Tube BID Elliott Salvador M.D.   60 mg at 05/13/21 0600   • PARoxetine (PAXIL) tablet 10 mg  10 mg Enteral Tube QAM Elliott Salvador M.D.   10 mg at 05/13/21 0622   • risperiDONE (RISPERDAL) tablet 2 mg  2 mg Enteral Tube BID Elliott Salvador M.D.   2 mg at 05/13/21 0621   • Respiratory Therapy Consult   Nebulization Continuous RT Man Wahl M.D.       • MD Alert...ICU Electrolyte Replacement per Pharmacy   Other PHARMACY TO DOSE Man Wahl M.D.       • ipratropium-albuterol (DUONEB) nebulizer solution  3 mL Nebulization Q2HRS PRN (RT) Man Wahl M.D.           Fluids    Intake/Output Summary (Last 24 hours) at 5/13/2021 1001  Last data filed at 5/13/2021 1000  Gross per 24 hour   Intake 945 ml   Output 450 ml   Net 495 ml       Laboratory          Recent Labs     05/11/21  0315 05/12/21  0420 05/13/21  0442   SODIUM 136 135 138   POTASSIUM 4.0 3.4* 4.0   CHLORIDE 102 100 103   CO2 28 29 28   BUN 15 13 15   CREATININE <0.17* 0.17* 0.22*   MAGNESIUM 1.8 1.8 1.8   PHOSPHORUS  --   --  3.3   CALCIUM 9.0 8.9 9.5     Recent Labs     05/10/21  1606 05/11/21 0315 05/12/21 0420 05/13/21 0442   ALTSGPT  --   --   --  16   ASTSGOT  --   --   --  13   ALKPHOSPHAT  --   --   --  741*   TBILIRUBIN  --   --   --  0.6   PREALBUMIN 17.4*  --   --   --    GLUCOSE  --  109* 108* 107*     Recent Labs     05/11/21 0315  05/12/21  0420 05/13/21  0442   WBC 8.8 10.5 10.0   NEUTSPOLYS 74.80* 81.50* 79.70*   LYMPHOCYTES 14.80* 7.80* 11.20*   MONOCYTES 8.70 9.20 7.90   EOSINOPHILS 0.00 0.00 0.00   BASOPHILS 0.60 0.30 0.40   ASTSGOT  --   --  13   ALTSGPT  --   --  16   ALKPHOSPHAT  --   --  741*   TBILIRUBIN  --   --  0.6     Recent Labs     05/11/21  0315 05/12/21  0420 05/13/21  0442   RBC 2.97* 3.14* 3.36*   HEMOGLOBIN 8.8* 9.1* 9.8*   HEMATOCRIT 28.6* 30.1* 32.1*   PLATELETCT 342 356 374       Imaging  X-Ray:  I have personally reviewed the images and compared with prior images.    Assessment/Plan  * Bacteremia due to methicillin susceptible Staphylococcus aureus (MSSA)- (present on admission)  Assessment & Plan  Source presumed right anterior chest line/port with endocarditis  Multiple sources for MSSA including MV/TV vegetation, port, spinal stimulator/ shunt  Status post port removal at [facility]  Last positive blood cultures were 4/17  Status post treatment with nafcillin from 4/16-4/23  ID following, continue cefepime    Debility  Assessment & Plan  PT/OT/SLP eval    Elevated alkaline phosphatase level- (present on admission)  Assessment & Plan  Unchanged --> recheck tomorrow  With dilated biliary ducts on imaging, stable  Unable to get MRCP due to nerve stimulator, ?  Eventual ERCP    Goals of care, counseling/discussion  Assessment & Plan  Palliative consulted  Full code    Spinal cord stimulator status  Assessment & Plan  Patient's stimulator is Nuvectra. It is FDA approved as MRI compatible, but the company has gone out of business, so there is no support team to assist with MRI.  Thus, if MRI were performed, our radiologists would need to protocol it correctly to manage the stimulator. The former rep from [employer] is Andre, [phone].  Information obtained from Dr. Mahoney's office, [phone].    Per  (5/4/2021), it is not MRI compatible unfortunately.     Pleural effusion, left  Assessment & Plan  Parapneumonic  fluid from abscesses  CT guided chest tube placed 4/23  TPA/dornase one dose on 4/24.  Hold d/t dropping Hgb  CT chest 4/25 with resolution of fluid  CT clamped 4/29-no significant accumulation of fluid so d/c 4/30  Monitor for now radiographically    Abnormal movement  Assessment & Plan  Tardive dyskinesia  Monitor with medication management    Atelectasis  Assessment & Plan  Pulmonary toiletry, ambulation    Pneumonia  Assessment & Plan  MSSA septic emboli with empyema   BAL 4/20 klebsiella pneumonia  BAL 4/24 still heavy growth of klebsiella  4/28 OR pleural fluid specimen growing klebsiella.   Continue cefepime per ID    Empyema of right pleural space (HCC)- (present on admission)  Assessment & Plan  R empyema due to septic pulmonary emboli  4/16 Chest tube placement at [facility]  4/18 Upsized by Dr Benedict  4/18-4/20 Received TPA/dornase.  Stopped b/c Hgb dropped requiring transfusion  4/26 was discontinued due to stopped functioning   4/28 s/p right VATS and decortication. Cx grew of Klebsiella pneumonia    Discussed with Dr. Ganser, plans for repeat decortication in the near future.  Continue chest tube drainage in the meantime    Acute bacterial endocarditis- (present on admission)  Assessment & Plan  MSSA bacteremia at OSH. First negative blood cultures were on 4/17  Infected port removed at [facility]   shunt and spinal stimulator could be seeded, NSGY has consulted on both and don't recommend removal at admission due to HD instability.   MV and TV vegetations with septic pulmonary emboli  Nataly ID Dr Omer/Beverley following.   Deemed not valvular surgical candidate at this time  Continue cefepime  Status post nafcilin from 4/16-4/22  ID following    Prolonged Q-T interval on ECG- (present on admission)  Assessment & Plan  Optimize electrolytes, continuous telemetry monitoring  Avoid QT prolonging medications as able    Acute respiratory failure with hypoxia (HCC)- (present on admission)  Assessment & Plan  4/15  Intubated for acute hypoxic respiratory failure at [facility] due to hydro/pneumo, trapped lung, lung abscesses. 5/1 s/p percutaneous tracheostomy    Continue T-piece (> 36 hours)  Continue capping trach with speaking valve/SLP  Titrate FiO2 to goal SPO2 greater than 90%, pulmonary toiletry    Tachycardia  Assessment & Plan  Persistent, sinus tach  Treat underlying causes    Chronic pain syndrome- (present on admission)  Assessment & Plan  Chronic back pain and intractable headaches  History of nerve stimulator   Dr Vargas consulted at admission, no plan to remove stimulator due to her clinical instability  Multimodal with scheduled oxycodone, gabapentin, methadone (increased dose 5/11)    Anemia  Assessment & Plan  Daily CBC with conservative transfusion strategy    Hypomagnesemia  Assessment & Plan  Magnesium 1.8  Replete to keep > 2.0    Hypokalemia  Assessment & Plan  Keep > 4.0    Anxiety- (present on admission)  Assessment & Plan  Improving  Continue Depakote, paxil, cymbalta, gabapentin, clonidine at current doses  Limited as needed benzodiazepines    S/P  shunt- (present on admission)  Assessment & Plan  History of pseudotumor cerebri  History of  shunt by Dr. Oh  Could be seeded by [facility], Dr. Oh consulted at [facility], recommended MRI but unable given her spinal stimulator    Acute encephalopathy- (present on admission)  Assessment & Plan  Improved  Begin to titrate down pain medicine as tolerates  Reorient and maintain sleep/wake cycle, mobilize    DVT prophylaxis: Enoxaparin  PUD prophylaxis: Not indicated  Glycemic control: PRN sliding scale insulin  Nutrition: Tube feeds  Lines: PICC  Lopez: None  Mobility: Early mobility as tolerated  Goals of care: Full code  Disposition: ICU --> med/surg    I have performed a physical exam and reviewed and updated ROS and Plan today (5/13/2021). In review of yesterday's note (5/12/2021), there are no changes except as documented above.     Discussed patient condition  and risk of morbidity and/or mortality with Hospitalist, RN, RT, Pharmacy, Charge nurse / hot rounds and Patient

## 2021-05-13 NOTE — PROGRESS NOTES
Infectious Disease Progress Note    Author: Eric Lowery M.D. Date & Time created: 5/12/2021  8:59 PM  Cefepime 4/23 -  current Day #16     Thoracoscopy & Decortication - 4/28     PRIOR Rx:   Zosyn 4/22-4/23  Previous: Unasyn, Zosyn, Vanco, Daptomycin  Ceftaroline: start 4/13-4/15  Nafcillin 2g Q4 hrs 4/15-4/23 4/14: Unable to give name of previous NSG or neurologist. Seems confused, but able to say some sentences fluently.   4/15: Line cultures now growing MSSA. As well as from the blood. Repeat blood culture 4/13+ in both sets. Patient has worsening confusion. CSF fluid analyses suggest pleocytosis. Cultures are no growth from the CSF. Chest CT was ordered this morning indicating a loculated right pleural effusion as well as bilateral septic emboli. Dr. Mack spoke with Dr. Oh yesterday and no surgical intervention unless clinical deterioration.  4/16: Increased respiratory distress.  Tachypneic.  CT with loculated collection.  Dr Resendiz not available.  No thoracic surgeon available.  Plans to have pulmonary place CT.  Transferring to ICU.  4/17: Transferred to [facility] last night. Hgb dropped to 6.4 requiring PRBC transfusion. Requiring 2 pressors. Fio2 50%. Febrile 38.8. Pending OR decortication of empyema and hemothorax. [facility] at [facility] shows 8,371 WBC, ,000, 74% N  4/18: Chest tube was upsized by surgery yesterday, no decortication. WBC 22k. Fio2 40%. Vasopressin. Levophed down to 2mcg. Afebrile 24 hrs. Last fever 38.6 on 4/16.   4/19: Still on vent. Levo 3 mcg, vasopressin. Afebrile 72 hours. WBC 21k. BC 4/17 NGTD x 48 hours. Unable to do MRI brain given neurostimulator per RN.   4/20: Remaining afebrile. Hb 6.2 and undergoing transfusion today.WBC 24,100, 5% bands.1700 ml CT output. Copious bloody ET secretions. No pressors. Receiving dornase and TPA per CT. Right pleural effusion not large per CXR today.   4/21: WBC 31k. Bronchoscopy yesterday showing blood. Cultures sent. TPA  on hold per RN due to arvind blood from chest tube requiring PRBC transfusion for hgb 6. Pending chest CT today. Per , spinal stimulator is non-MRI compatible. Levophed 10mcg. 30% Fio2. Afebrile.   4/22: Fever 101.3 this am. WBC 20,300 down from 32,200 yesterday. BAL growing Klebsiella pneumoniae but susceptibility panel not set up until today. CTA of brain shows no lesion. CT of chest shows enlarging cavitary lung lesions bilat and large loculated new left pleural effusion and large hydropneumothorax on right. TPA & dornase infusions of right chest ended 4/20 after considerable bleeding requiring transfusion. Hb now down again to 6.8.  4/23: WBC 23k. Fio2 40%. Tmax 38.1. US of stiumlator shows small fluid collection but no drainable abscess. Pending MICAH today. Pending L chest tube placement  4/24: Continue fever 100.8-101.8. WBC 16,600. MICAH shows large vegetations on both tricuspid valve and mitral valve with mild TR & MR.  No aortic root abscess. Left chest tube placed yesterday yielding 8 ml of old bloody fluid. Bronch today revealed copious thick maroon secretions in distal trachea and both mainstem bronchi. On the right these were obstructive. Remaining off pressors. Last positive blood cultures (MSSA) were 4/16, negative 4/17.  4/25: Fever 100.6 this AM. wBC 13,300. Hb 6.6. CT: right hydropneumothorax increasing, right bronchopleural fistula, mediastinal shift ot the left , multiple cavitary pulmonary nodules, 3.1 cm left basilar mass, splenomegaly. CT of head shows dural thickening over the clivus. Lac+ GNR >100,000 col/ml growing from BAL of 4/24, presumed Klebsiella. Left peural fluid culture negative from 4/23.  4/26: Fever 100.4 last night. WBC 13,500. Hb 7.4. Plts 502,000. ESR >120. UA fairly clear except 30 mg% protein, 2-5 wbc and rare rbc. CXR unchanged except more prominent pulmonary edema. GNR in BAL may be a different species of Klebsiella, and resistant to ampicillin & tmp-sulfa;  confirmation pending.  4/28: Fever 100.8 last night. WBC 27,700. Hb 6.6. Plts 482,000. Creat 0.19. Kleb pneum in BAL on 4/24 is resistant to ampicillin & tmp-sulfa but otherwise sensitive. O2 sat 96% on FIO2 30% now, PEEP 8. Has been re-evaluated by thoracic surgeon, Dr. Ganser, who believes she will not wean without decortication on the right. Surgery anticipated today.  4/29: S/P right thoracoscopy with decortication with cultures NG at 24 hrs.  4/30: Had a bronch due to hypoxia and hypotension. CXR with worsening right hemithorax pulmonary opacities. Bloody mucous plugs removed. Pressors started. Febrile this am. Tachy in 130s. Pressors just removed. Tolerating vent, but not a SBT candidate at the moment.  5/1: Small air leak CT-2 every ~ 2/3 breathes. The K. pneumoniae from her thoracoscopy is sensitive to her cefepime so no antibiotic change needed.   5/2: No air leak today she tolerated 2  hrs of spontaneous breathing with support today.      5/3: Second day with SBT and doing well now for 2 hrs. Slight bump in temperature and      WBC's but no obvious clinical infectious changed so watch closely.      5/4: Fever still from time to time. WBCs trending up. Chest tubes in place. Trach.       5/5: She clearly is better today she was sitting up in bed with open eyes and follows commands. She still has low grade fever but her WBC's are dropping. Cefepime dose increased yesterday for increased CNS coverage if necessary. She will probably need further thoracic surgery as mentioned by Dr. Ganser. The issue of what to do with her stimulator is still up in the air for now as it probably will need to be removed but she is nort a candidate for more major surgery at this time.       5/7: She continues to improve and under go longer SBT trials. She just had a new PICC placed in her right arm and plan is to D/C central line.        5/8: PICC placed. No fevers. Comfortable. Cortrak placed. Failing SBTs.  5/9: No acute issues.  No fevers. Comfortable. Family at bedside.   5/10: No acute issues, fevers or WBC bumps. No changes in condition. She is to get her CT scan today and be reviewed by surgery  5/11:  at bedside.  Has been referred to LANE.  Repeat CT scan completed.  Results noted.  ? If Dr Ganser has seen.  Still with an empyema:  ? If candidate for further surgery.  5/12: Pending next thoracic surgery. No fever. Anxious.    Interval History:  Past 24 hrs reviewed with RN.    Labs Reviewed, Medications Reviewed, Radiology Reviewed, Wound Reviewed, Fluids Reviewed and GI Nutrition Reviewed    Review of Systems:  Review of Systems   Constitutional: Negative for fever.   Cardiovascular: Negative for chest pain.   Gastrointestinal: Negative for abdominal pain.       Physical Exam:  Physical Exam  Constitutional:       Appearance: Normal appearance.   HENT:      Nose:      Comments: NG  Neck:      Comments: ET  Cardiovascular:      Rate and Rhythm: Normal rate and regular rhythm.   Pulmonary:      Breath sounds: Rhonchi present.      Comments: CT x 2 in place.  Bloody drainage in tubes.  Abdominal:      General: Abdomen is flat.   Skin:     General: Skin is warm and dry.   Neurological:      General: No focal deficit present.      Mental Status: She is alert.         Labs:  Recent Results (from the past 24 hour(s))   CBC with Differential    Collection Time: 05/12/21  4:20 AM   Result Value Ref Range    WBC 10.5 4.8 - 10.8 K/uL    RBC 3.14 (L) 4.20 - 5.40 M/uL    Hemoglobin 9.1 (L) 12.0 - 16.0 g/dL    Hematocrit 30.1 (L) 37.0 - 47.0 %    MCV 95.9 81.4 - 97.8 fL    MCH 29.0 27.0 - 33.0 pg    MCHC 30.2 (L) 33.6 - 35.0 g/dL    RDW 60.4 (H) 35.9 - 50.0 fL    Platelet Count 356 164 - 446 K/uL    MPV 9.5 9.0 - 12.9 fL    Neutrophils-Polys 81.50 (H) 44.00 - 72.00 %    Lymphocytes 7.80 (L) 22.00 - 41.00 %    Monocytes 9.20 0.00 - 13.40 %    Eosinophils 0.00 0.00 - 6.90 %    Basophils 0.30 0.00 - 1.80 %    Immature Granulocytes 1.20 (H) 0.00  "- 0.90 %    Nucleated RBC 0.00 /100 WBC    Neutrophils (Absolute) 8.55 (H) 2.00 - 7.15 K/uL    Lymphs (Absolute) 0.82 (L) 1.00 - 4.80 K/uL    Monos (Absolute) 0.96 (H) 0.00 - 0.85 K/uL    Eos (Absolute) 0.00 0.00 - 0.51 K/uL    Baso (Absolute) 0.03 0.00 - 0.12 K/uL    Immature Granulocytes (abs) 0.13 (H) 0.00 - 0.11 K/uL    NRBC (Absolute) 0.00 K/uL   Basic Metabolic Panel (BMP)    Collection Time: 05/12/21  4:20 AM   Result Value Ref Range    Sodium 135 135 - 145 mmol/L    Potassium 3.4 (L) 3.6 - 5.5 mmol/L    Chloride 100 96 - 112 mmol/L    Co2 29 20 - 33 mmol/L    Glucose 108 (H) 65 - 99 mg/dL    Bun 13 8 - 22 mg/dL    Creatinine 0.17 (L) 0.50 - 1.40 mg/dL    Calcium 8.9 8.5 - 10.5 mg/dL    Anion Gap 6.0 (L) 7.0 - 16.0   Magnesium    Collection Time: 05/12/21  4:20 AM   Result Value Ref Range    Magnesium 1.8 1.5 - 2.5 mg/dL   ESTIMATED GFR    Collection Time: 05/12/21  4:20 AM   Result Value Ref Range    GFR If African American >60 >60 mL/min/1.73 m 2    GFR If Non African American >60 >60 mL/min/1.73 m 2     Results     Procedure Component Value Units Date/Time    BLOOD CULTURE [[id number]] Collected: 05/04/21 1200    Order Status: Completed Specimen: Blood from Peripheral Updated: 05/09/21 1300     Significant Indicator NEG     Source BLD     Site PERIPHERAL     Culture Result No growth after 5 days of incubation.    Narrative:      Collected By:[id number] FERNANDA WISE  Per Hospital Policy: Only change Specimen Src: to \"Line\" if  specified by physician order.  Left AC    BLOOD CULTURE [[id number]] Collected: 05/04/21 1150    Order Status: Completed Specimen: Blood from Peripheral Updated: 05/09/21 1300     Significant Indicator NEG     Source BLD     Site PERIPHERAL     Culture Result No growth after 5 days of incubation.    Narrative:      Collected By:[id number] FERNANDA WISE  Per Hospital Policy: Only change Specimen Src: to \"Line\" if  specified by physician order.  Right Hand    "     Hemodynamics:  Temp (24hrs), Av.9 °C (98.4 °F), Min:36.7 °C (98.1 °F), Max:37 °C (98.6 °F)  Temperature: 36.7 °C (98.1 °F)  Pulse  Av.9  Min: 40  Max: 149   Blood Pressure: 102/61    PICC Double Lumen 21 Lumen 1: Purple Lumen 2: Red Right Brachial (Active)   Site Assessment Clean;Dry;Intact 21   Lumen 1 Status Blood return noted;Flushed;Normal saline locked;Scrubbed the hub prior to access 21   Lumen 2 Status Blood return noted;Flushed;Normal saline locked;Scrubbed the hub prior to access 21   Line Secured at (cm) 1 cm 21   Extremity Circumference (cm) 30.5 cm 21   Dressing Type Biopatch;Securing device;Skin barrier;Transparent 21   Dressing Status Clean;Dry;Intact 21   Dressing Intervention N/A 05/10/21 0800   Dressing Change Due 05/15/21 05/11/21 2000   Date Primary Tubing Changed 21 08   Date Secondary Tubing Changed 21   Date IV Connector(s) Changed 05/15/21 05/11/21 0800   NEXT IV Connector(s) Change 21   Line Necessity Assessed Antibiotic Therapy Greater than 7 Days 05/10/21 2000   $ Double Lumen PICC Charge Single kit used 21     Wound:  @WOUNDLDA(4)@     Fluids:  Intake/Output                             05/10/21 0700 - 21 - 21 - 21 Total  Total  Total                    Intake    P.O.  0  -- 0  --  -- --  --  -- --    P.O. 0 -- 0 -- -- -- -- -- --    I.V.  --  -- --  150  -- 150  --  -- --    Magnesium Sulfate Volume -- -- -- 150 -- 150 -- -- --    NG/GT  150  540 690  540  360 900  450  -- 450    Intake (mL) ([REMOVED] Enteral Tube 21 10 Fr. Right nare) 150 540 690 540 -- 540 -- -- --    Intake (mL) (Enteral Tube 21 Cortrak - Gastric) -- -- -- -- 360 360 450 -- 450    Total Intake 150 540 690  450 --  450       Output    Urine  400  500 900  1300  -- 1300  --  -- --    Number of Times Voided 4 x -- 4 x -- -- -- -- -- --    Number of Times Incontinent of Urine 3 x -- 3 x -- -- -- -- -- --    Urine Void (mL) 0 -- 0 -- -- -- -- -- --    Output (mL) (External Urinary Catheter (Female Wick)) 400  -- 1300 -- -- --    Drains  0  -- 0  --  -- --  --  -- --    Residual Amount (ml) (Discarded) 0 -- 0 -- -- -- -- -- --    Residual Amount (mL) (Discarded) ([REMOVED] Enteral Tube 05/07/21 10 Fr. Right nare) 0 -- 0 -- -- -- -- -- --    Stool  0  -- 0  --  -- --  --  -- --    Number of Times Stooled 0 x 0 x 0 x 2 x -- 2 x -- -- --    Measurable Stool (mL) 0 -- 0 -- -- -- -- -- --    Emesis/NG output  0  -- 0  0  -- 0  --  -- --    Output (mL) ([REMOVED] Enteral Tube 05/07/21 10 Fr. Right nare) 0 -- 0 0 -- 0 -- -- --    Chest Tube  5  -- 5  20  -- 20  0  -- 0    Output (mL) (Chest Tube 2 Right Midaxillary) 5 -- 5 20 -- 20 0 -- 0    Output (mL) (Chest Tube 1 Right Midaxillary;Pleural) 0 -- 0 0 -- 0 0 -- 0    Total Output 405  -- 1320 0 -- 0       Net I/O     -255 40 -215 -630 360 -270 450 -- 450        Weight: 75.1 kg (165 lb 9.1 oz)  GI/Nutrition:  Orders Placed This Encounter   Procedures   • Diet NPO     Standing Status:   Standing     Number of Occurrences:   1     Order Specific Question:   Restrict to:     Answer:   Strict [1]     Medications:  Current Facility-Administered Medications   Medication Last Admin   • methadone (DOLOPHINE) 10 MG/ML solution 10 mg 10 mg at 05/12/21 1500   • gabapentin (NEURONTIN) capsule 300 mg 300 mg at 05/12/21 1500   • cloNIDine (CATAPRES) tablet 0.1 mg 0.1 mg at 05/12/21 1500   • oxyCODONE immediate release (ROXICODONE) tablet 20 mg 20 mg at 05/12/21 1745   • traMADol (ULTRAM) 50 MG tablet 50 mg 50 mg at 05/11/21 0320   • cefepime (MAXIPIME) 2 g in  mL IVPB Stopped at 05/12/21 1813   • labetalol (NORMODYNE/TRANDATE) injection 10 mg 10 mg at 05/04/21 0226   •  enoxaparin (LOVENOX) inj 40 mg 40 mg at 05/12/21 0518   • Pharmacy Consult: Enteral tube insertion - review meds/change route/product selection     • acetaminophen (Tylenol) tablet 650 mg 650 mg at 05/12/21 1233   • magnesium hydroxide (MILK OF MAGNESIA) suspension 30 mL      And   • senna-docusate (PERICOLACE or SENOKOT S) 8.6-50 MG per tablet 2 tablet 2 tablet at 05/12/21 1745    And   • polyethylene glycol/lytes (MIRALAX) PACKET 1 Packet 1 Packet at 05/11/21 0525    And   • bisacodyl (DULCOLAX) suppository 10 mg     • divalproex (DEPAKOTE SPRINKLE) capsule 500 mg 500 mg at 05/12/21 1500   • DULoxetine (CYMBALTA) capsule 60 mg 60 mg at 05/12/21 1746   • PARoxetine (PAXIL) tablet 10 mg 10 mg at 05/12/21 0518   • risperiDONE (RISPERDAL) tablet 2 mg 2 mg at 05/12/21 1746   • Respiratory Therapy Consult     • MD Alert...ICU Electrolyte Replacement per Pharmacy     • lidocaine (XYLOCAINE) 1 % injection 1-2 mL     • ipratropium-albuterol (DUONEB) nebulizer solution       Medical Decision Making, by Problem:  Active Hospital Problems    Diagnosis    • *Bacteremia due to methicillin susceptible Staphylococcus aureus (MSSA) [R78.81, B95.61]    • Agitation requiring sedation protocol [R45.1]    • 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]    • Acute respiratory failure with hypoxia (HCC) [J96.01]    • Acute bacterial endocarditis [I33.0]    • Empyema of right pleural space (HCC) [J86.9]    • Acute encephalopathy [G93.40]    • Fever [R50.9]    • Pleural effusion, left [J90]    • Atelectasis [J98.11]    • CSF pleocytosis [D72.9]    • Pneumonia [J18.9]    • Tachycardia [R00.0]    • Pseudotumor cerebri [G93.2]    • S/P  shunt [Z98.2]    • Prolonged Q-T interval on ECG [R94.31]    • History of vasculitis [Z86.79]    • History of complicated migraines [G43.109]    • Hyponatremia [E87.1]    • H/O: CVA (cerebrovascular accident) [Z86.73]    • Chronic pain syndrome [G89.4]    • Thrombocytopenia (HCC) [D69.6]    • Anxiety [F41.9]    • Spinal cord  stimulator status [Z96.89]    • Goals of care, counseling/discussion [Z71.89]    • Abnormal movement [G25.9]    • Pulmonary abscess (HCC) [J85.2]    • Anasarca [R60.1]    • Thrombocytosis (HCC) [D47.3]    • GERD (gastroesophageal reflux disease) [K21.9]    • Septic shock (HCC) [A41.9, R65.21]    • Anemia [D64.9]    • Hypokalemia [E87.6]      Impression   -Severe sepsis  -Catheter related bloodstream infection due to infected port status post removal 4/12-staph aureus  -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       - Acute left empyema s/p chest tube placement 4/23  -Multiple acute septic pulmonary emboli bilaterally  -Acute bilateral pneumonia due to above     --Pulmonary edema  -Pseudotumor cerebri with underlying  shunt: possible arachnoiditis  -Chronic migraine headaches  -History of autoimmune vasculitis  -Elevated alkaline phosphatase & bilirubin  -Acute encephalopathy due to sepsis      - anemia due to above      - New secondary Klebsiella pneumoniae pneumonia (presumed VAP)     Plan   - Continue cefepime - clinical endpoint - Empyema needs clearance.   - Await surgery decision for further operation time  - Vent management per pulmonology - SBT trials  - Tolerated the decortication. Cx NGTD.   - No IV abx changes or further ID workup in the moment  - Continue to monitor closely  - Improving overall.   - Current abx covering CNS and back hardware.    - This HW is not coming out anytime soon per NS.  - No ID changes today   - Dispo:          - Has been referred to LANE.     Case and treatment reviewed with patient(best possible), her  and RN.    > 35 min on floor in ICU with > 50% face to face view and 100% in direct patient care/counseling today.    Dr Lowery

## 2021-05-13 NOTE — PROGRESS NOTES
We are planning right thoracotomy for extensive decortication on Monday afternoon 5/17. She will need to be medically optimized. Recommend repeating INR and TEG.

## 2021-05-14 ENCOUNTER — APPOINTMENT (OUTPATIENT)
Dept: RADIOLOGY | Facility: MEDICAL CENTER | Age: 51
DRG: 003 | End: 2021-05-14
Attending: INTERNAL MEDICINE
Payer: MEDICARE

## 2021-05-14 ENCOUNTER — APPOINTMENT (OUTPATIENT)
Dept: RADIOLOGY | Facility: MEDICAL CENTER | Age: 51
DRG: 003 | End: 2021-05-14
Attending: HOSPITALIST
Payer: MEDICARE

## 2021-05-14 LAB
ALBUMIN SERPL BCP-MCNC: 2.8 G/DL (ref 3.2–4.9)
ALBUMIN/GLOB SERPL: 0.6 G/DL
ALP SERPL-CCNC: 642 U/L (ref 30–99)
ALT SERPL-CCNC: 13 U/L (ref 2–50)
ANION GAP SERPL CALC-SCNC: 8 MMOL/L (ref 7–16)
AST SERPL-CCNC: 15 U/L (ref 12–45)
BILIRUB SERPL-MCNC: 0.5 MG/DL (ref 0.1–1.5)
BUN SERPL-MCNC: 17 MG/DL (ref 8–22)
CALCIUM SERPL-MCNC: 8.6 MG/DL (ref 8.5–10.5)
CHLORIDE SERPL-SCNC: 104 MMOL/L (ref 96–112)
CO2 SERPL-SCNC: 27 MMOL/L (ref 20–33)
CREAT SERPL-MCNC: <0.17 MG/DL (ref 0.5–1.4)
ERYTHROCYTE [DISTWIDTH] IN BLOOD BY AUTOMATED COUNT: 60.1 FL (ref 35.9–50)
GLOBULIN SER CALC-MCNC: 4.5 G/DL (ref 1.9–3.5)
GLUCOSE SERPL-MCNC: 104 MG/DL (ref 65–99)
HCT VFR BLD AUTO: 31.3 % (ref 37–47)
HGB BLD-MCNC: 9.1 G/DL (ref 12–16)
MAGNESIUM SERPL-MCNC: 1.8 MG/DL (ref 1.5–2.5)
MCH RBC QN AUTO: 28.3 PG (ref 27–33)
MCHC RBC AUTO-ENTMCNC: 29.1 G/DL (ref 33.6–35)
MCV RBC AUTO: 97.2 FL (ref 81.4–97.8)
PHOSPHATE SERPL-MCNC: 3.8 MG/DL (ref 2.5–4.5)
PLATELET # BLD AUTO: 319 K/UL (ref 164–446)
PMV BLD AUTO: 9.2 FL (ref 9–12.9)
POTASSIUM SERPL-SCNC: 3.7 MMOL/L (ref 3.6–5.5)
PROT SERPL-MCNC: 7.3 G/DL (ref 6–8.2)
RBC # BLD AUTO: 3.22 M/UL (ref 4.2–5.4)
SODIUM SERPL-SCNC: 139 MMOL/L (ref 135–145)
WBC # BLD AUTO: 7.1 K/UL (ref 4.8–10.8)

## 2021-05-14 PROCEDURE — 83735 ASSAY OF MAGNESIUM: CPT

## 2021-05-14 PROCEDURE — 700111 HCHG RX REV CODE 636 W/ 250 OVERRIDE (IP): Performed by: INTERNAL MEDICINE

## 2021-05-14 PROCEDURE — 71045 X-RAY EXAM CHEST 1 VIEW: CPT

## 2021-05-14 PROCEDURE — 80053 COMPREHEN METABOLIC PANEL: CPT

## 2021-05-14 PROCEDURE — 770001 HCHG ROOM/CARE - MED/SURG/GYN PRIV*

## 2021-05-14 PROCEDURE — 700105 HCHG RX REV CODE 258: Performed by: INTERNAL MEDICINE

## 2021-05-14 PROCEDURE — 700102 HCHG RX REV CODE 250 W/ 637 OVERRIDE(OP): Performed by: INTERNAL MEDICINE

## 2021-05-14 PROCEDURE — 700102 HCHG RX REV CODE 250 W/ 637 OVERRIDE(OP): Performed by: HOSPITALIST

## 2021-05-14 PROCEDURE — 99233 SBSQ HOSP IP/OBS HIGH 50: CPT | Performed by: HOSPITALIST

## 2021-05-14 PROCEDURE — A9270 NON-COVERED ITEM OR SERVICE: HCPCS | Performed by: INTERNAL MEDICINE

## 2021-05-14 PROCEDURE — 85027 COMPLETE CBC AUTOMATED: CPT

## 2021-05-14 PROCEDURE — A9270 NON-COVERED ITEM OR SERVICE: HCPCS | Performed by: HOSPITALIST

## 2021-05-14 PROCEDURE — 84100 ASSAY OF PHOSPHORUS: CPT

## 2021-05-14 PROCEDURE — 92526 ORAL FUNCTION THERAPY: CPT

## 2021-05-14 PROCEDURE — 94640 AIRWAY INHALATION TREATMENT: CPT

## 2021-05-14 PROCEDURE — 700111 HCHG RX REV CODE 636 W/ 250 OVERRIDE (IP): Performed by: HOSPITALIST

## 2021-05-14 RX ORDER — MAGNESIUM SULFATE HEPTAHYDRATE 40 MG/ML
2 INJECTION, SOLUTION INTRAVENOUS ONCE
Status: COMPLETED | OUTPATIENT
Start: 2021-05-14 | End: 2021-05-14

## 2021-05-14 RX ORDER — OXYCODONE HYDROCHLORIDE 10 MG/1
10 TABLET ORAL EVERY 6 HOURS
Status: DISCONTINUED | OUTPATIENT
Start: 2021-05-14 | End: 2021-05-17

## 2021-05-14 RX ORDER — DIAZEPAM 5 MG/1
5 TABLET ORAL
Status: DISCONTINUED | OUTPATIENT
Start: 2021-05-14 | End: 2021-05-16

## 2021-05-14 RX ORDER — BUSPIRONE HYDROCHLORIDE 10 MG/1
5 TABLET ORAL 3 TIMES DAILY
Status: DISCONTINUED | OUTPATIENT
Start: 2021-05-14 | End: 2021-05-15

## 2021-05-14 RX ADMIN — METHADONE HYDROCHLORIDE 10 MG: 10 CONCENTRATE ORAL at 14:12

## 2021-05-14 RX ADMIN — ACETAMINOPHEN 500 MG: 325 TABLET, FILM COATED ORAL at 21:42

## 2021-05-14 RX ADMIN — METHADONE HYDROCHLORIDE 10 MG: 10 CONCENTRATE ORAL at 05:47

## 2021-05-14 RX ADMIN — MAGNESIUM SULFATE 2 G: 2 INJECTION INTRAVENOUS at 10:44

## 2021-05-14 RX ADMIN — OXYCODONE HYDROCHLORIDE 10 MG: 10 TABLET ORAL at 09:10

## 2021-05-14 RX ADMIN — RISPERIDONE 2 MG: 1 TABLET, FILM COATED ORAL at 05:48

## 2021-05-14 RX ADMIN — BUSPIRONE HYDROCHLORIDE 5 MG: 10 TABLET ORAL at 11:26

## 2021-05-14 RX ADMIN — DIAZEPAM 5 MG: 5 TABLET ORAL at 21:42

## 2021-05-14 RX ADMIN — OXYCODONE HYDROCHLORIDE 10 MG: 10 TABLET ORAL at 01:13

## 2021-05-14 RX ADMIN — CLONIDINE HYDROCHLORIDE 0.1 MG: 0.1 TABLET ORAL at 21:42

## 2021-05-14 RX ADMIN — CEFEPIME 2 G: 2 INJECTION, POWDER, FOR SOLUTION INTRAVENOUS at 01:13

## 2021-05-14 RX ADMIN — DULOXETINE HYDROCHLORIDE 60 MG: 60 CAPSULE, DELAYED RELEASE ORAL at 17:58

## 2021-05-14 RX ADMIN — OXYCODONE HYDROCHLORIDE 10 MG: 10 TABLET ORAL at 17:57

## 2021-05-14 RX ADMIN — DIVALPROEX SODIUM 500 MG: 125 CAPSULE ORAL at 14:13

## 2021-05-14 RX ADMIN — DULOXETINE HYDROCHLORIDE 60 MG: 60 CAPSULE, DELAYED RELEASE ORAL at 05:47

## 2021-05-14 RX ADMIN — METHADONE HYDROCHLORIDE 10 MG: 10 CONCENTRATE ORAL at 21:42

## 2021-05-14 RX ADMIN — OXYCODONE HYDROCHLORIDE 10 MG: 10 TABLET ORAL at 23:50

## 2021-05-14 RX ADMIN — DIVALPROEX SODIUM 500 MG: 125 CAPSULE ORAL at 05:47

## 2021-05-14 RX ADMIN — DOCUSATE SODIUM 50 MG AND SENNOSIDES 8.6 MG 2 TABLET: 8.6; 5 TABLET, FILM COATED ORAL at 05:48

## 2021-05-14 RX ADMIN — CLONIDINE HYDROCHLORIDE 0.1 MG: 0.1 TABLET ORAL at 05:48

## 2021-05-14 RX ADMIN — CLONIDINE HYDROCHLORIDE 0.1 MG: 0.1 TABLET ORAL at 14:13

## 2021-05-14 RX ADMIN — ACETAMINOPHEN 500 MG: 325 TABLET, FILM COATED ORAL at 09:11

## 2021-05-14 RX ADMIN — PAROXETINE HYDROCHLORIDE 10 MG: 20 TABLET, FILM COATED ORAL at 05:48

## 2021-05-14 RX ADMIN — GABAPENTIN 300 MG: 300 CAPSULE ORAL at 05:47

## 2021-05-14 RX ADMIN — DOCUSATE SODIUM 50 MG AND SENNOSIDES 8.6 MG 2 TABLET: 8.6; 5 TABLET, FILM COATED ORAL at 17:57

## 2021-05-14 RX ADMIN — GABAPENTIN 300 MG: 300 CAPSULE ORAL at 14:13

## 2021-05-14 RX ADMIN — BUSPIRONE HYDROCHLORIDE 5 MG: 10 TABLET ORAL at 17:58

## 2021-05-14 RX ADMIN — ACETAMINOPHEN 500 MG: 325 TABLET, FILM COATED ORAL at 14:12

## 2021-05-14 RX ADMIN — METOPROLOL TARTRATE 25 MG: 25 TABLET, FILM COATED ORAL at 10:44

## 2021-05-14 RX ADMIN — GABAPENTIN 300 MG: 300 CAPSULE ORAL at 21:42

## 2021-05-14 RX ADMIN — ENOXAPARIN SODIUM 40 MG: 40 INJECTION SUBCUTANEOUS at 05:47

## 2021-05-14 RX ADMIN — CEFEPIME 2 G: 2 INJECTION, POWDER, FOR SOLUTION INTRAVENOUS at 09:10

## 2021-05-14 RX ADMIN — TRAMADOL HYDROCHLORIDE 50 MG: 50 TABLET ORAL at 14:13

## 2021-05-14 RX ADMIN — RISPERIDONE 2 MG: 1 TABLET, FILM COATED ORAL at 17:58

## 2021-05-14 RX ADMIN — METOPROLOL TARTRATE 25 MG: 25 TABLET, FILM COATED ORAL at 17:58

## 2021-05-14 RX ADMIN — OXYCODONE HYDROCHLORIDE 10 MG: 10 TABLET ORAL at 05:47

## 2021-05-14 RX ADMIN — CEFEPIME 2 G: 2 INJECTION, POWDER, FOR SOLUTION INTRAVENOUS at 17:58

## 2021-05-14 RX ADMIN — DIVALPROEX SODIUM 500 MG: 125 CAPSULE ORAL at 21:42

## 2021-05-14 RX ADMIN — POTASSIUM BICARBONATE 25 MEQ: 978 TABLET, EFFERVESCENT ORAL at 10:44

## 2021-05-14 ASSESSMENT — PAIN DESCRIPTION - PAIN TYPE
TYPE: CHRONIC PAIN

## 2021-05-14 ASSESSMENT — PATIENT HEALTH QUESTIONNAIRE - PHQ9
2. FEELING DOWN, DEPRESSED, IRRITABLE, OR HOPELESS: NOT AT ALL
1. LITTLE INTEREST OR PLEASURE IN DOING THINGS: NOT AT ALL
SUM OF ALL RESPONSES TO PHQ9 QUESTIONS 1 AND 2: 0

## 2021-05-14 ASSESSMENT — ENCOUNTER SYMPTOMS
ABDOMINAL PAIN: 0
FEVER: 0

## 2021-05-14 NOTE — CARE PLAN
Problem: Pain Management  Goal: Pain level will decrease to patient's comfort goal  Outcome: Progressing     Problem: Knowledge Deficit - Standard  Goal: Patient and family/care givers will demonstrate understanding of plan of care, disease process/condition, diagnostic tests and medications  Outcome: Progressing     Problem: Fall Risk  Goal: Patient will remain free from falls  Outcome: Progressing   The patient is Stable - Low risk of patient condition declining or worsening         Progress made toward(s) clinical / shift goals:  Discussed future scheduled procedures.    Patient is not progressing towards the following goals:

## 2021-05-14 NOTE — CARE PLAN
Problem: Safety  Goal: Will remain free from falls  Outcome: Progressing     Problem: Infection  Goal: Will remain free from infection  Outcome: Progressing   The patient is Watcher - Medium risk of patient condition declining or worsening

## 2021-05-14 NOTE — ASSESSMENT & PLAN NOTE
From septic emboli  Now s/p decortication and thoracotomy     IR for draining tube placed with 10 cc bloody fluid draining out, sent to lab for analysis, low likelihood of infection vs high likelihood of post surgical hemothorax.  Resume eliquis after

## 2021-05-14 NOTE — ASSESSMENT & PLAN NOTE
Patient's stimulator is Nuvectra. It is FDA approved as MRI compatible, but the company has gone out of business, so there is no support team to assist with MRI.  Thus, if MRI were performed, our radiologists would need to protocol it correctly to manage the stimulator. The former rep from [employer] is Andre, [phone].  Information obtained from Dr. Mahoney's office, [phone].     Per  (5/4/2021), it is not MRI compatible unfortunately.

## 2021-05-14 NOTE — THERAPY
Speech Language Pathology  Daily Treatment     Patient Name: Enedelia Pang  Age:  50 y.o., Sex:  female  Medical Record #: [id number]  Today's Date: 5/14/2021     Precautions  Precautions: (P) Fall Risk, Nasogastric Tube, Swallow Precautions ( See Comments), Tracheostomy   Comments: (P)  (chest tube x2)    Assessment    Pt seen this date for dysphagia intervention and additional blue dye trials. RN reporting no blue dye suctioned from trach over past 24 hours, and that pt tolerated speaking valve placement for 1.5 hours this AM. Pt with cuffed Portex 8.0 in place and on 7L at 35% FiO2 via t-piece. Cuff deflated prior to start of session, clinician verified cuff fully deflated prior to valve placement. Vitals stable prior to placement of valve (HR 90, /53, 98%SpO2, RR13). Speaking valve placed. Pt presenting with reduced vocal intensity, and demonstrated adequate breath support for 2-3 words per breath. PO trials of ice x3, MTL by tsp x7, LQ3 by tsp x3 and thins by tsp x3 assessed. Hyolaryngeal elevation palpated as complete but weak and timely initiation of swallow appreciated. Change in vocal quality and reports of globus sensation appreciated with trials of liquidized, which is concerning for pharyngeal residue. Pt reported alleviation of globus sensation after MTL wash. Wet vocal quality appreciated with trials of thins, which is concerning for possible penetration/aspiration.Vitals remained stable during and after speaking valve placement. Pt tolerated speaking valve placement fo 23 minutes without difficulty or s/sx of respiratory distress. Speaking valve removed at end of session per pt preference, pt resting comfortable in chair.     1. Recommend continuation of NPO with non-oral source of nutrition with FEES diagnostic next week to further assess pharyngeal swallow function and rule out/confirm aspiration.   2. Please document any blue dye suctioned from trach.  3.  Okay for trained staff to place the  "speaking valve while the patient is awake with close supervision. Ensure cuff is deflated prior to placement.     SLP following.       Plan    Continue current treatment plan.    Discharge Recommendations: (P) Recommend post-acute placement for additional speech therapy services prior to discharge home    Subjective    Pt was anxious, but agreeable to session and followed directions.      Objective       05/14/21 1351   Dysphagia    Dysphagia X   Positioning / Behavior Modification Self Monitoring;Effortful Swallow;Modulate Rate or Bite Size;Multiple Swallows;Alternate Solids and Liquids   Other Treatments PO trials of blue-dyed ice, MTL, LQ3, thins   Diet / Liquid Recommendation NPO;Pre-Feeding Trials with SLP Only   Nutritional Liquid Intake Rating Scale Nothing by mouth   Nutritional Food Intake Rating Scale Nothing by mouth   Recommended Route of Medication Administration   Medication Administration  Via Gastric Tube   Patient / Family Goals   Patient / Family Goal #1 \"I can breathe better\"   Goal #1 Outcome Progressing as expected   Short Term Goals   Short Term Goal # 1 The patient will tolerate the speaking valve while awake with no s/sx of emotional or respiratory distress   Goal Outcome # 1 Progressing as expected   Short Term Goal # 2 Pt will consume prefeeding trials with SLP only, given no overt s/sx of aspiration.    Goal Outcome # 2  Progressing as expected         "

## 2021-05-14 NOTE — DIETARY
Nutrition support weekly update:  Day 28 of admit.  Enedelia Pang is a 50 y.o. female with admitting DX of empyema, sepsis due to port line infection, endocarditis.  Tube feeding initiated on 4/18.     Current TF via gastric Cortrak is Impact Peptide 1.5 at goal rate of 45 mL/hr off propofol providing 1620 kcals, 102 grams protein, 832 mL free water, and 151 g CHO.    Assessment:  Weight 75.1 kg via bed scale 5/12. Weight is stable with admission weight of 75 kg via bed scale 4/16. Per I/Os, patient is -3.2 L fluids.   Re-estimate of nutritional needs not indicated at this time.     Evaluation:   1. Patient assessed by SLP yesterday who recommends continued NPO status with tube feeding, continued PO trials to assess swallowing function.  2. MAR: magnesium sulfate, ICU electrolyte replacement  3. Labs: Alk Phos 642 (H, trend down), Creat <0.17 (L), glu 104-109 over past 72 h  4. No new documented pressure wounds.  5. Plan for right thoracotomy Monday afternoon (5/17).   6. Patient documented with 1+ general edema, 1+/trace BLE/BUE edema - mild improvement over past week.  7. Patient has been off vent for two days; however, low volume, high protein formula continues to be appropriate at this time.     Malnutrition risk: No new criteria met at this time.     Recommendations/Plan:  1. Continue TF formula and rate.  2. Fluids per MD.  3. Advance to PO diet per SLP.    RD continues to follow.

## 2021-05-14 NOTE — ASSESSMENT & PLAN NOTE
MSSA  Ancef per ID through 6/14  Repeat CT Chest showing some residual empyema: have DW ID and will plan to tap.  Prefer not to go back to the OR given the pt's prolonged and complex hospital course

## 2021-05-14 NOTE — PROGRESS NOTES
Surgery    Case reviewed with Dr. Ganser.  Although she has shown modest clinical improvement and is no longer not critically ill, she is severely deconditioned and very high risk for bleeding and persistent air leak etc.  She will require thoracotomy for any hope of even partial lung decortication.  Surgery tentatively planned for Monday afternoon.  Continue aggressive medical optimization.  Agree with repeating TEG.     Drew Vasquez M.D.  [facility]  [phone]

## 2021-05-14 NOTE — ASSESSMENT & PLAN NOTE
Persistent, likely secondary with multiple underlying ongoing conditions  Monitor  Slowly trending down; now 80-90s

## 2021-05-14 NOTE — ASSESSMENT & PLAN NOTE
Patient with overall high risk of morbidity and mortality given her gravely ill state and prior chronic medical conditions  Palliative care following

## 2021-05-14 NOTE — PROGRESS NOTES
Infectious Disease Progress Note    Author: Eric Lowery M.D. Date & Time created: 5/14/2021  1:42 PM  Cefepime 4/23 -  current Day #19     Thoracoscopy & Decortication - 4/28     PRIOR Rx:   Zosyn 4/22-4/23  Previous: Unasyn, Zosyn, Vanco, Daptomycin  Ceftaroline: start 4/13-4/15  Nafcillin 2g Q4 hrs 4/15-4/23 4/14: Unable to give name of previous NSG or neurologist. Seems confused, but able to say some sentences fluently.   4/15: Line cultures now growing MSSA. As well as from the blood. Repeat blood culture 4/13+ in both sets. Patient has worsening confusion. CSF fluid analyses suggest pleocytosis. Cultures are no growth from the CSF. Chest CT was ordered this morning indicating a loculated right pleural effusion as well as bilateral septic emboli. Dr. Mack spoke with Dr. Oh yesterday and no surgical intervention unless clinical deterioration.  4/16: Increased respiratory distress.  Tachypneic.  CT with loculated collection.  Dr Resendiz not available.  No thoracic surgeon available.  Plans to have pulmonary place CT.  Transferring to ICU.  4/17: Transferred to [facility] last night. Hgb dropped to 6.4 requiring PRBC transfusion. Requiring 2 pressors. Fio2 50%. Febrile 38.8. Pending OR decortication of empyema and hemothorax. Chest tube placed at [facility] shows 8,371 WBC, ,000, 74% N  4/18: Chest tube was upsized by surgery yesterday, no decortication. WBC 22k. Fio2 40%. Vasopressin. Levophed down to 2mcg. Afebrile 24 hrs. Last fever 38.6 on 4/16.   4/19: Still on vent. Levo 3 mcg, vasopressin. Afebrile 72 hours. WBC 21k. BC 4/17 NGTD x 48 hours. Unable to do MRI brain given neurostimulator per RN.   4/20: Remaining afebrile. Hb 6.2 and undergoing transfusion today.WBC 24,100, 5% bands.1700 ml CT output. Copious bloody ET secretions. No pressors. Receiving dornase and TPA per CT. Right pleural effusion not large per CXR today.   4/21: WBC 31k. Bronchoscopy yesterday showing blood. Cultures sent. TPA  on hold per RN due to arvind blood from chest tube requiring PRBC transfusion for hgb 6. Pending chest CT today. Per , spinal stimulator is non-MRI compatible. Levophed 10mcg. 30% Fio2. Afebrile.   4/22: Fever 101.3 this am. WBC 20,300 down from 32,200 yesterday. BAL growing Klebsiella pneumoniae but susceptibility panel not set up until today. CTA of brain shows no lesion. CT of chest shows enlarging cavitary lung lesions bilat and large loculated new left pleural effusion and large hydropneumothorax on right. TPA & dornase infusions of right chest ended 4/20 after considerable bleeding requiring transfusion. Hb now down again to 6.8.  4/23: WBC 23k. Fio2 40%. Tmax 38.1. US of stiumlator shows small fluid collection but no drainable abscess. Pending MICAH today. Pending L chest tube placement  4/24: Continue fever 100.8-101.8. WBC 16,600. MICAH shows large vegetations on both tricuspid valve and mitral valve with mild TR & MR.  No aortic root abscess. Left chest tube placed yesterday yielding 8 ml of old bloody fluid. Bronch today revealed copious thick maroon secretions in distal trachea and both mainstem bronchi. On the right these were obstructive. Remaining off pressors. Last positive blood cultures (MSSA) were 4/16, negative 4/17.  4/25: Fever 100.6 this AM. wBC 13,300. Hb 6.6. CT: right hydropneumothorax increasing, right bronchopleural fistula, mediastinal shift ot the left , multiple cavitary pulmonary nodules, 3.1 cm left basilar mass, splenomegaly. CT of head shows dural thickening over the clivus. Lac+ GNR >100,000 col/ml growing from BAL of 4/24, presumed Klebsiella. Left peural fluid culture negative from 4/23.  4/26: Fever 100.4 last night. WBC 13,500. Hb 7.4. Plts 502,000. ESR >120. UA fairly clear except 30 mg% protein, 2-5 wbc and rare rbc. CXR unchanged except more prominent pulmonary edema. GNR in BAL may be a different species of Klebsiella, and resistant to ampicillin & tmp-sulfa;  confirmation pending.  4/28: Fever 100.8 last night. WBC 27,700. Hb 6.6. Plts 482,000. Creat 0.19. Kleb pneum in BAL on 4/24 is resistant to ampicillin & tmp-sulfa but otherwise sensitive. O2 sat 96% on FIO2 30% now, PEEP 8. Has been re-evaluated by thoracic surgeon, Dr. Ganser, who believes she will not wean without decortication on the right. Surgery anticipated today.  4/29: S/P right thoracoscopy with decortication with cultures NG at 24 hrs.  4/30: Had a bronch due to hypoxia and hypotension. CXR with worsening right hemithorax pulmonary opacities. Bloody mucous plugs removed. Pressors started. Febrile this am. Tachy in 130s. Pressors just removed. Tolerating vent, but not a SBT candidate at the moment.  5/1: Small air leak CT-2 every ~ 2/3 breathes. The K. pneumoniae from her thoracoscopy is sensitive to her cefepime so no antibiotic change needed.   5/2: No air leak today she tolerated 2  hrs of spontaneous breathing with support today.      5/3: Second day with SBT and doing well now for 2 hrs. Slight bump in temperature and      WBC's but no obvious clinical infectious changed so watch closely.      5/4: Fever still from time to time. WBCs trending up. Chest tubes in place. Trach.       5/5: She clearly is better today she was sitting up in bed with open eyes and follows commands. She still has low grade fever but her WBC's are dropping. Cefepime dose increased yesterday for increased CNS coverage if necessary. She will probably need further thoracic surgery as mentioned by Dr. Ganser. The issue of what to do with her stimulator is still up in the air for now as it probably will need to be removed but she is nort a candidate for more major surgery at this time.       5/7: She continues to improve and under go longer SBT trials. She just had a new PICC placed in her right arm and plan is to D/C central line.        5/8: PICC placed. No fevers. Comfortable. Cortrak placed. Failing SBTs.  5/9: No acute issues.  No fevers. Comfortable. Family at bedside.   5/10: No acute issues, fevers or WBC bumps. No changes in condition. She is to get her CT scan today and be reviewed by surgery  5/11:  at bedside.  Has been referred to LANE.  Repeat CT scan completed.  Results noted.  ? If Dr Ganser has seen.  Still with an empyema:  ? If candidate for further surgery.  5/12: Pending next thoracic surgery. No fever. Anxious.  5/13: ? Surgery date.  No one seems to know.  Had speaking valve in and having swallow eval with speech therapy  5/14: No acute issues. No fevers. Surgery Monday.    Interval History:  Past 24 hrs reviewed with RN.    Labs Reviewed, Medications Reviewed, Radiology Reviewed, Wound Reviewed, Fluids Reviewed and GI Nutrition Reviewed    Review of Systems:  Review of Systems   Constitutional: Negative for fever.   Cardiovascular: Negative for chest pain.   Gastrointestinal: Negative for abdominal pain.       Physical Exam:  Physical Exam  Constitutional:       Appearance: Normal appearance.   HENT:      Nose:      Comments: NG  Neck:      Comments: ET  Cardiovascular:      Rate and Rhythm: Normal rate and regular rhythm.   Pulmonary:      Breath sounds: Rhonchi present.      Comments: CT x 2 in place.  Bloody drainage in tubes.  Abdominal:      General: Abdomen is flat.   Skin:     General: Skin is warm and dry.   Neurological:      General: No focal deficit present.      Mental Status: She is alert.         Labs:  Recent Results (from the past 24 hour(s))   CBC WITHOUT DIFFERENTIAL    Collection Time: 05/14/21  1:18 AM   Result Value Ref Range    WBC 7.1 4.8 - 10.8 K/uL    RBC 3.22 (L) 4.20 - 5.40 M/uL    Hemoglobin 9.1 (L) 12.0 - 16.0 g/dL    Hematocrit 31.3 (L) 37.0 - 47.0 %    MCV 97.2 81.4 - 97.8 fL    MCH 28.3 27.0 - 33.0 pg    MCHC 29.1 (L) 33.6 - 35.0 g/dL    RDW 60.1 (H) 35.9 - 50.0 fL    Platelet Count 319 164 - 446 K/uL    MPV 9.2 9.0 - 12.9 fL   Comp Metabolic Panel    Collection Time: 05/14/21  1:18  "AM   Result Value Ref Range    Sodium 139 135 - 145 mmol/L    Potassium 3.7 3.6 - 5.5 mmol/L    Chloride 104 96 - 112 mmol/L    Co2 27 20 - 33 mmol/L    Anion Gap 8.0 7.0 - 16.0    Glucose 104 (H) 65 - 99 mg/dL    Bun 17 8 - 22 mg/dL    Creatinine <0.17 (L) 0.50 - 1.40 mg/dL    Calcium 8.6 8.5 - 10.5 mg/dL    AST(SGOT) 15 12 - 45 U/L    ALT(SGPT) 13 2 - 50 U/L    Alkaline Phosphatase 642 (H) 30 - 99 U/L    Total Bilirubin 0.5 0.1 - 1.5 mg/dL    Albumin 2.8 (L) 3.2 - 4.9 g/dL    Total Protein 7.3 6.0 - 8.2 g/dL    Globulin 4.5 (H) 1.9 - 3.5 g/dL    A-G Ratio 0.6 g/dL   MAGNESIUM    Collection Time: 21  1:18 AM   Result Value Ref Range    Magnesium 1.8 1.5 - 2.5 mg/dL   PHOSPHORUS    Collection Time: 21  1:18 AM   Result Value Ref Range    Phosphorus 3.8 2.5 - 4.5 mg/dL   ESTIMATED GFR    Collection Time: 21  1:18 AM   Result Value Ref Range    GFR If African American >60 >60 mL/min/1.73 m 2    GFR If Non African American >60 >60 mL/min/1.73 m 2     Results     Procedure Component Value Units Date/Time    BLOOD CULTURE [[id number]] Collected: 21 1200    Order Status: Completed Specimen: Blood from Peripheral Updated: 21 1300     Significant Indicator NEG     Source BLD     Site PERIPHERAL     Culture Result No growth after 5 days of incubation.    Narrative:      Collected By:[id number] FERNANDA WISE  Per Hospital Policy: Only change Specimen Src: to \"Line\" if  specified by physician order.  Left AC    BLOOD CULTURE [[id number]] Collected: 21 1150    Order Status: Completed Specimen: Blood from Peripheral Updated: 21 1300     Significant Indicator NEG     Source BLD     Site PERIPHERAL     Culture Result No growth after 5 days of incubation.    Narrative:      Collected By:[id number] FERNANDA WISE  Per Hospital Policy: Only change Specimen Src: to \"Line\" if  specified by physician order.  Right Hand        Hemodynamics:  Temp (24hrs), Av.4 °C (97.6 °F), Min:36.4 °C " (97.5 °F), Max:36.6 °C (97.8 °F)  Temperature: 36.4 °C (97.5 °F)  Pulse  Av.4  Min: 40  Max: 149   Blood Pressure: 131/77    PICC Double Lumen 21 Lumen 1: Purple Lumen 2: Red Right Brachial (Active)   Site Assessment Clean;Dry;Intact 21 08   Lumen 1 Status Blood return noted;Flushed;Normal saline locked;Scrubbed the hub prior to access 21 08   Lumen 2 Status Blood return noted;Flushed;Normal saline locked;Scrubbed the hub prior to access 21 0800   Line Secured at (cm) 1 cm 21   Extremity Circumference (cm) 30.5 cm 21 111   Dressing Type Biopatch;Securing device;Skin barrier;Transparent 21 08   Dressing Status Clean;Dry;Intact 21 08   Dressing Intervention N/A 21   Dressing Change Due 05/15/21 05/11/21 2000   Date Primary Tubing Changed 21 08   Date Secondary Tubing Changed 21 08   Date IV Connector(s) Changed 05/15/21 05/11/21 08   NEXT IV Connector(s) Change 21 111   Line Necessity Assessed Antibiotic Therapy Greater than 7 Days 21 0800   $ Double Lumen PICC Charge Single kit used 21 111     Wound:  @WOUNDLDA(4)@     Fluids:  Intake/Output                             21 - 2159 21 - 21 0659 21 07 - 05/15/21 0659     [id number] 6646-4225 Total 3909-6445 7411-7975 Total 9718-3896 6058-6641 Total                    Intake    Other  --  -- --  220  -- 220  --  -- --    Flush / Irrigation Volume (< Enter Name Here >) -- -- -- 220 -- 220 -- -- --    NG/GT  450  540 990  450  90 540  --  -- --    Intake (mL) (Enteral Tube 21 Cortrak - Gastric) 450 540 990 450 90 540 -- -- --    Total Intake 450 540 990 670 90 760 -- -- --       Output    Urine  --  400 400  300  -- 300  --  -- --    Number of Times Voided -- 1 x 1 x -- 1 x 1 x -- -- --    Urine Void (mL) -- -- -- 300 -- 300 -- -- --    Output (mL) (External Urinary Catheter (Female  Artie) -- 400 400 -- -- -- -- -- --    Stool  --  -- --  --  -- --  --  -- --    Number of Times Stooled -- -- -- -- 1 x 1 x -- -- --    Chest Tube  0  50 50  20  0 20  --  -- --    Output (mL) (Chest Tube 2 Right Midaxillary) 0 40 40 20 0 20 -- -- --    Output (mL) (Chest Tube 1 Right Midaxillary;Pleural) 0 10 10 0 0 0 -- -- --    Total Output 0 450 450 320 0 320 -- -- --       Net I/O     450 90 540 350 90 440 -- -- --           GI/Nutrition:  Orders Placed This Encounter   Procedures   • Diet NPO     Standing Status:   Standing     Number of Occurrences:   1     Order Specific Question:   Restrict to:     Answer:   Strict [1]     Medications:  Current Facility-Administered Medications   Medication Last Admin   • busPIRone (BUSPAR) tablet 5 mg 5 mg at 05/14/21 1126   • oxyCODONE immediate release (ROXICODONE) tablet 10 mg     • metoprolol tartrate (LOPRESSOR) tablet 25 mg     • ondansetron (ZOFRAN) syringe/vial injection 4 mg 4 mg at 05/13/21 0431   • acetaminophen (TYLENOL) tablet 500 mg 500 mg at 05/14/21 0911   • oxyCODONE immediate-release (ROXICODONE) tablet 5 mg     • methadone (DOLOPHINE) 10 MG/ML solution 10 mg 10 mg at 05/14/21 0547   • gabapentin (NEURONTIN) capsule 300 mg 300 mg at 05/14/21 0547   • cloNIDine (CATAPRES) tablet 0.1 mg 0.1 mg at 05/14/21 0548   • traMADol (ULTRAM) 50 MG tablet 50 mg 50 mg at 05/13/21 1438   • cefepime (MAXIPIME) 2 g in  mL IVPB Stopped at 05/14/21 0940   • labetalol (NORMODYNE/TRANDATE) injection 10 mg 10 mg at 05/04/21 0226   • enoxaparin (LOVENOX) inj 40 mg 40 mg at 05/14/21 0547   • Pharmacy Consult: Enteral tube insertion - review meds/change route/product selection     • acetaminophen (Tylenol) tablet 650 mg 650 mg at 05/12/21 1233   • magnesium hydroxide (MILK OF MAGNESIA) suspension 30 mL      And   • senna-docusate (PERICOLACE or SENOKOT S) 8.6-50 MG per tablet 2 tablet 2 tablet at 05/14/21 0548    And   • polyethylene glycol/lytes (MIRALAX) PACKET 1 Packet  1 Packet at 05/11/21 0525    And   • bisacodyl (DULCOLAX) suppository 10 mg     • divalproex (DEPAKOTE SPRINKLE) capsule 500 mg 500 mg at 05/14/21 0547   • DULoxetine (CYMBALTA) capsule 60 mg 60 mg at 05/14/21 0547   • PARoxetine (PAXIL) tablet 10 mg 10 mg at 05/14/21 0548   • risperiDONE (RISPERDAL) tablet 2 mg 2 mg at 05/14/21 0548   • Respiratory Therapy Consult     • MD Alert...ICU Electrolyte Replacement per Pharmacy     • ipratropium-albuterol (DUONEB) nebulizer solution       Medical Decision Making, by Problem:  Active Hospital Problems    Diagnosis    • *Bacteremia due to methicillin susceptible Staphylococcus aureus (MSSA) [R78.81, B95.61]    • Agitation requiring sedation protocol [R45.1]    • 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]    • Acute respiratory failure with hypoxia (HCC) [J96.01]    • Acute bacterial endocarditis [I33.0]    • Empyema of right pleural space (HCC) [J86.9]    • Acute encephalopathy [G93.40]    • Fever [R50.9]    • Pleural effusion, left [J90]    • Atelectasis [J98.11]    • CSF pleocytosis [D72.9]    • Pneumonia [J18.9]    • Tachycardia [R00.0]    • Pseudotumor cerebri [G93.2]    • S/P  shunt [Z98.2]    • Prolonged Q-T interval on ECG [R94.31]    • History of vasculitis [Z86.79]    • History of complicated migraines [G43.109]    • Hyponatremia [E87.1]    • H/O: CVA (cerebrovascular accident) [Z86.73]    • Chronic pain syndrome [G89.4]    • Thrombocytopenia (HCC) [D69.6]    • Anxiety [F41.9]    • Spinal cord stimulator status [Z96.89]    • Goals of care, counseling/discussion [Z71.89]    • Abnormal movement [G25.9]    • Pulmonary abscess (HCC) [J85.2]    • Anasarca [R60.1]    • Thrombocytosis (HCC) [D47.3]    • GERD (gastroesophageal reflux disease) [K21.9]    • Septic shock (HCC) [A41.9, R65.21]    • Anemia [D64.9]    • Hypokalemia [E87.6]      Impression   -Severe sepsis  -Catheter related bloodstream infection due to infected port status post removal 4/12-staph aureus  -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       - Acute left empyema s/p chest tube placement 4/23  -Multiple acute septic pulmonary emboli bilaterally  -Acute bilateral pneumonia due to above     --Pulmonary edema  -Pseudotumor cerebri with underlying  shunt: possible arachnoiditis  -Chronic migraine headaches  -History of autoimmune vasculitis  -Elevated alkaline phosphatase & bilirubin  -Acute encephalopathy due to sepsis      - anemia due to above      - New secondary Klebsiella pneumoniae pneumonia (presumed VAP)     Plan   - Continue cefepime - clinical endpoint - Empyema needs clearance.    - Monday for surgery   - Would appreciate cultures at that time.  - Has had cefepime since 4/28 to cover Kleb - this should be treated by now   - Will continue through the surgery and if no new organisms assess if we can go back to MSSA coverage alone  - Vent management per pulmonology - SBT trials  - Tolerated the decortication. Cx NGTD.   - No IV abx changes or further ID workup  - Continue to monitor closely  - Improving overall.   - Current abx covering CNS and back hardware.    - This HW is not coming out anytime soon per NS.  - No ID changes today   - Dispo:         - Pending tolerance of surgery.    Case and treatment reviewed with patient(best possible)    > 30 min on floor in ICU with > 50% face to face view and 100% in direct patient care/counseling today.    Dr Lowery

## 2021-05-14 NOTE — PROGRESS NOTES
Hospital Medicine Daily Progress Note    Date of Service  5/14/2021    Chief Complaint  50 y.o. female admitted 4/16/2021 with empyema with MSSA, complicated by respiratory failure and thoracic surgery consultation, transferred from an outlying facility namely [facility]    Hospital Course  This is a 50-year-old female with a past me history of pseudotumor cerebri s/p  shunt implantation by Dr. Oh neurosurgery, chronic pain syndrome with a history of placement of a nerve stimulator, vasculitis, right anterior chest port for home IV medication administration, recurrent infections related to port, presented on 4/11 to [facility] with recurrent port infection was initially treated with vancomycin and Zosyn and then changed to ceftaroline subsequently switched to nafcillin, MSSA identified.  Serum infectious disease is managing antibiotics for the patient.  Dr. Candelaria surgery removed the port on 4/12.  The patient was further identified to have endocarditis of the tricuspid valve, a lumbar puncture performed on 4/14 showed pleocytosis with negative Gram stain.  The patient suffered worsening leukocytosis and was found to have septic emboli per CT.  Initially a small pleural catheter was placed.  Significant loculations were encountered.  MSSA empyema was diagnosed.  Neurosurgery was consulted to comment on possibly removal of a  shunt, this was felt not appropriate at this time.  The patient remained on mechanical ventilation for 15 days, then a perc trach was placed.  The patient was eventually liberated from mechanical ventilation, the patient did have a right thoracoscopy and decortication of the lung performed on 4/28 by Dr. Ganser.  The patient was of identified to have multiple sources for MSSA including mitral valve, tricuspid valve vegetation, port, spinal stimulator,  shunt possibly.  The patient is slowly improving, she is significant chronic pain issues,  it was determined that her loculated pleural fluid is returning and the patient will need more extensive thoracotomy/pleurodesis on 5/17 with Dr. Ganser.  The patient was referred to LTAC pending additional surgical interventions to be completed    Interval Problem Update  Patient seen and examined today.    Patient tolerating treatment and therapies.  All Data, Medication data reviewed.  Case discussed with nursing as available.  Plan of Care reviewed with patient and notified of changes.  5/13 the patient appears significantly lethargic, possible medication effects, she is able to awaken and respond some, she is complaining of back pain, afebrile, heart rate in the 1 teens to 130s, respiration in the 15 range unlabored, the patient is on 7 L T-piece, 96%, blood pressure 108/64, improving anemia hemoglobin 9.8, chemistry fairly unremarkable except for alk phos of 741 which is improving,  5/14 the patient states that she has much anxiety, less so pain today, she was informed about the surgical plans for Monday, medication adjusted, still appears very lethargic and debilitated and weak  Consultants/Specialty  Critical care/pulmonary  Infectious disease  Thoracic surgery  Palliative care    Code Status  Full Code    Disposition  Will likely postacute treatment at an LTAC level    Review of Systems  Review of Systems   Unable to perform ROS: Mental acuity        Physical Exam  Temp:  [36.4 °C (97.5 °F)-36.6 °C (97.8 °F)] 36.4 °C (97.5 °F)  Pulse:  [] 80  Resp:  [11-21] 16  BP: ()/(43-82) 131/77  SpO2:  [94 %-99 %] 97 %    Physical Exam  Vitals and nursing note reviewed.   Constitutional:       Appearance: She is well-developed and normal weight. She is ill-appearing. She is not diaphoretic.      Interventions: Cervical collar in place.      Comments: Lethargic, chronically ill-appearing female, looks much older than stated age   HENT:      Head: Normocephalic and atraumatic.      Nose: Nose normal.       Comments: Core track     Mouth/Throat:      Mouth: Mucous membranes are dry.   Eyes:      Conjunctiva/sclera: Conjunctivae normal.      Pupils: Pupils are equal, round, and reactive to light.   Neck:      Thyroid: No thyromegaly.      Vascular: No JVD.      Comments: Tracheostomy  Cardiovascular:      Rate and Rhythm: Normal rate and regular rhythm.      Heart sounds: Normal heart sounds. No friction rub. No gallop.    Pulmonary:      Effort: Pulmonary effort is normal.      Breath sounds: Rhonchi and rales present. No wheezing.      Comments: 2 chest tubes in place with serosanguineous fluid  Abdominal:      General: Bowel sounds are normal. There is no distension.      Palpations: Abdomen is soft. There is no mass.      Tenderness: There is no abdominal tenderness. There is no guarding or rebound.   Musculoskeletal:         General: No tenderness. Normal range of motion.      Cervical back: Normal range of motion and neck supple.   Lymphadenopathy:      Cervical: No cervical adenopathy.   Skin:     General: Skin is warm and dry.   Neurological:      General: No focal deficit present.      Mental Status: She is lethargic and disoriented.      Cranial Nerves: No cranial nerve deficit.      Motor: Weakness present.         Fluids    Intake/Output Summary (Last 24 hours) at 5/14/2021 1406  Last data filed at 5/13/2021 2000  Gross per 24 hour   Intake 370 ml   Output 195 ml   Net 175 ml       Laboratory  Recent Labs     05/12/21  0420 05/13/21  0442 05/14/21  0118   WBC 10.5 10.0 7.1   RBC 3.14* 3.36* 3.22*   HEMOGLOBIN 9.1* 9.8* 9.1*   HEMATOCRIT 30.1* 32.1* 31.3*   MCV 95.9 95.5 97.2   MCH 29.0 29.2 28.3   MCHC 30.2* 30.5* 29.1*   RDW 60.4* 59.7* 60.1*   PLATELETCT 356 374 319   MPV 9.5 9.2 9.2     Recent Labs     05/12/21  0420 05/13/21  0442 05/14/21  0118   SODIUM 135 138 139   POTASSIUM 3.4* 4.0 3.7   CHLORIDE 100 103 104   CO2 29 28 27   GLUCOSE 108* 107* 104*   BUN 13 15 17   CREATININE 0.17* 0.22* <0.17*    CALCIUM 8.9 9.5 8.6                   Imaging  DX-CHEST-PORTABLE (1 VIEW)   Final Result         1.  Hazy right pulmonary infiltrates and/or effusion, similar compared to prior study.      DX-CHEST-PORTABLE (1 VIEW)   Final Result         1.  Hazy right pulmonary infiltrates and/or effusion, similar compared to prior study.      DX-ABDOMEN FOR TUBE PLACEMENT   Final Result         1.  Nonspecific bowel gas pattern.   2.  Dobbhoff tube tip terminates overlying the expected location of the gastric antrum.   3.  Hazy bilateral lung base infiltrates, greater on the right.      DX-CHEST-PORTABLE (1 VIEW)   Final Result         1.  Hazy right pulmonary infiltrates and/or effusion, similar compared to prior study.      US-RUQ   Final Result         1.  Hepatomegaly   2.  Mild intrahepatic and extrahepatic biliary ductal dilatation, commonly associated with postcholecystectomy physiology, consider causes of biliary obstruction with additional workup as clinically appropriate      CT-CHEST,ABDOMEN,PELVIS WITH   Final Result      1.  Large empyema in the RIGHT hemithorax, slightly decreased in size from prior exam with chest tubes present.   2.  Consolidation remains.   3.  Multiple cavitary lesions again seen in the LEFT upper lobe, minimally decreased from prior, concerning for septic emboli.   4.  Supportive tubing is unchanged.   5.  Intrahepatic and extrahepatic biliary dilation, likely postoperative although distal stricture, stone or mass remain possibilities.   6.  Moderate pelvic fluid, nonspecific.   7.  No evidence of bowel obstruction or perforation.      DX-CHEST-PORTABLE (1 VIEW)   Final Result         1.  Hazy right pulmonary infiltrates and/or effusion, similar compared to prior study.      DX-CHEST-PORTABLE (1 VIEW)   Final Result         No significant interval change.            DX-CHEST-PORTABLE (1 VIEW)   Final Result         Interval removal of left central venous catheter. Interval adjustment of right  PICC with tip in the SVC.         DX-ABDOMEN FOR TUBE PLACEMENT   Final Result      Feeding tube tip at the mid to distal stomach.      IR-PICC LINE PLACEMENT W/ GUIDANCE > AGE 5   Final Result                  Ultrasound-guided PICC placement performed by qualified nursing staff as    above.          DX-CHEST-PORTABLE (1 VIEW)   Final Result      1.  Stable cardiopulmonary findings.   2.  See comments above regarding the right-sided PICC position.      DX-CHEST-PORTABLE (1 VIEW)   Final Result      Stable chest findings compared to 5/5.      DX-CHEST-PORTABLE (1 VIEW)   Final Result      Stable chest findings compared with 5/3.      DX-ABDOMEN FOR TUBE PLACEMENT   Final Result         Feeding tube with tip projecting over the expected area of the stomach.      DX-CHEST-PORTABLE (1 VIEW)   Final Result      Stable chest findings compared with prior.      DX-CHEST-PORTABLE (1 VIEW)   Final Result         1. No significant interval change.      US-EXTREMITY ARTERY LOWER UNILAT RIGHT   Final Result      DX-CHEST-PORTABLE (1 VIEW)   Final Result         1. No significant interval change.      IR-PICC LINE PLACEMENT W/ GUIDANCE > AGE 5   Final Result                  Ultrasound-guided PICC placement performed by qualified nursing staff as    above.          DX-CHEST-PORTABLE (1 VIEW)   Final Result         1.  Pulmonary edema and/or infiltrates, similar to prior study.   2.  Stable opacification right lung, likely effusion. Thoracostomy tubes are in place.   3.  Multiple cavitary appearing left pulmonary lesions, see CT performed April 27, 2021 for further characterization      DX-CHEST-PORTABLE (1 VIEW)   Final Result         1.  Pulmonary edema and/or infiltrates, similar to prior study.   2.  Single opacification right lung, likely effusion. Thoracostomy tubes are in place.   3.  Multiple cavitary appearing left pulmonary lesions, see CT performed April 27, 2021 for further characterization   4.  Soft tissue gas in  the right chest wall      DX-CHEST-PORTABLE (1 VIEW)   Final Result         1.  Pulmonary edema and/or infiltrates, similar to prior study.   2.  Increased opacification right lung, likely increased effusion. Thoracostomy tubes are in place.   3.  Multiple cavitary appearing left pulmonary lesions, see CT performed April 27, 2021 for further characterization   4.  Soft tissue gas in the right chest wall      DX-CHEST-PORTABLE (1 VIEW)   Final Result         1.  Pulmonary edema and/or infiltrates, similar to prior study.   2.  Right pneumothorax, stable compared to prior study, right thoracostomy tubes remain in place   3.  Multiple cavitary appearing left pulmonary lesions, see CT performed April 27, 2021 for further characterization   4.  Soft tissue gas in the right chest wall      DX-CHEST-PORTABLE (1 VIEW)   Final Result         1.  Pulmonary edema and/or infiltrates, similar to prior study.   2.  Right pneumothorax, interval decreased compared to prior study, interval placement of right thoracostomy tubes is noted.   3.  Multiple cavitary appearing left pulmonary lesions, see CT performed yesterday for further characterization   4.  Soft tissue gas in the right chest wall      DX-CHEST-PORTABLE (1 VIEW)   Final Result         1.  Pulmonary edema and/or infiltrates, similar to prior study.   2.  Right pneumothorax, stable compared to prior study, interval removal of right thoracostomy tube is noted.   3.  Multiple cavitary appearing left pulmonary lesions, see CT performed yesterday for further characterization   4.  Soft tissue gas in the right chest wall      DX-CHEST-LIMITED (1 VIEW)   Final Result         No significant interval change.      CT-CTA CHEST PULMONARY ARTERY W/ RECONS   Final Result         No CT evidence of pulmonary embolus.      Previous right chest tube were removed.      Grossly similar in size of the loculated right hydropneumothorax but there is increased layering fluid component.  Unchanged mild shift of the mediastinal to the left.      Redemonstration of a pigtail catheter in the medial left lung base. Grossly similar multiple cavitary lesions in the left lung.      US-EXTREMITY VENOUS LOWER BILAT   Final Result      POCT BUTTERFLY-DUPLEX SCAN EXTREMITY VEINS LIMITED   Final Result      DX-CHEST-PORTABLE (1 VIEW)   Final Result         1.  Pulmonary edema and/or infiltrates, similar to prior study.   2.  Right pneumothorax, somewhat decreased to prior study, interval removal of right thoracostomy tube is noted.   3.  Multiple cavitary appearing left pulmonary lesions, see CT performed yesterday for further characterization   4.  Soft tissue gas in the right chest wall      DX-CHEST-PORTABLE (1 VIEW)   Final Result         1.  Pulmonary edema and/or infiltrates, similar to prior study.   2.  Right pneumothorax, similar to prior study with thoracostomy tube in place.   3.  Multiple cavitary appearing left pulmonary lesions, see CT performed yesterday for further characterization      CT-CHEST (THORAX) W/O   Final Result      Large loculated right hydropneumothorax with interval increase in size of the pneumothorax and pleural fluid.      Right chest tube is in the posterior right thorax.      There is mediastinal shift to the left compatible with tension.      Small pericardial effusion.      Small loculated left pleural effusion with overlying atelectasis/consolidation.   Pigtail catheter is seen at the medial left lung base. Pleural effusion has decreased in size compared to prior.      There are multiple foci of air extending from the right lung to the pleural space suspicious for a bronchopleural fistula.      Multiple cavitary lesions bilaterally with mild interval decrease of the solid component of the cavitary nodules.      Noncavitary 3.1 cm masslike density is seen at the left lung base.      Findings may be related to septic emboli, malignancy or multifocal abscesses. Short interval  follow-up recommended.      Soft tissue air on the right is again seen.      Splenomegaly      Findings discussed with Leti Sun.      DX-CHEST-PORTABLE (1 VIEW)   Final Result      No significant change from prior exam.      CT-HEAD W/O   Final Result      1.  RIGHT frontal ventriculostomy catheter unchanged in position.   2.  Mild cortical atrophy.   3.  No intracranial hemorrhage.   4.  Apparent dural thickening overlying the clivus.  Consider further evaluation with contrast-enhanced MRI.      DX-CHEST-LIMITED (1 VIEW)   Final Result      Interval left basilar pigtail catheter with diminished atelectasis, pleural fluid      Stable right hydropneumothorax with thoracostomy tube in place, considerable soft tissue gas and partial lung collapse      IR-CHEST TUBE-EMPYEMA LEFT   Final Result      1.  CT GUIDED LEFT  10 [place] PIGTAIL CHEST TUBE PLACEMENT FOR POSSIBLE EMPYEMA YIELDING OLD BLOODY FLUID.      2. A CHEST RADIOGRAPH IS FORTHCOMING.      EC-MICAH W/O CONT   Final Result      US-ABDOMEN LTD (SOFT TISSUE)   Final Result      No fluid seen surrounding the electronic implanted spinal stimulator device.      DX-CHEST-LIMITED (1 VIEW)   Final Result      1.  Large right hydropneumothorax with right-sided chest tube in place, likely stable to slightly decreased from prior study.   2.  Small left pleural effusion. Mild improved aeration in the left lung.   3.  Bilateral pulmonary opacities which could be related to combination of atelectasis, edema and/or infection..      CT-CTA HEAD WITH & W/O-POST PROCESS   Final Result      1.  Patent carotid and vertebral arteries.      2.  Again seen right frontal the jugular peritoneal shunt catheter. There is mild increase in volume of the lateral and third ventricles.      CT-CHEST (THORAX) W/O   Final Result      1.  Interval placement of a right-sided chest tube into a large loculated right-sided pleural effusion. There is now seen a large right-sided hydropneumothorax  in the area that previously seen fluid. The chest tube extends into that hydropneumothorax.    There is some residual pleural fluid seen as well. A hydropneumothorax is somewhat loculated with components most prominently inferiorly and medially.      2.  New loculated left pleural effusion.      3.  Again seen multiple bilateral cavitary pulmonary masses which are more prominent than previously seen with increasing cavitation and measure up to 3 cm size. Differential diagnosis includes septic emboli, multifocal abscesses and neoplasm.      4.  Large amount of subcutaneous emphysema on the right.      5.  Splenomegaly.      6.  Multiple support devices.      Fleischner Society pulmonary nodule recommendations:         DX-CHEST-LIMITED (1 VIEW)   Final Result      1.  Support devices as described above.      2.  Right chest tube present with a again seen right-sided pneumothorax, possibly increased in size and loculated.      3.  Worsening bilateral pulmonary infiltrates.      DX-ABDOMEN FOR TUBE PLACEMENT   Final Result      Cortrak feeding tube tip projects in the region of the duodenal bulb.      DX-CHEST-LIMITED (1 VIEW)   Final Result      Loculated right pneumothorax is decreased in size compared to prior.      Small left pleural effusion.      Small loculated right pleural effusion.      Extensive bilateral airspace opacities are not significantly changed.      Soft tissue air on the right is again noted.      DX-CHEST-LIMITED (1 VIEW)   Final Result      Decreased partially loculated right pleural fluid with increased pleural air. Right chest tube is in place.      Increased hazy opacity in the left lung possibly atelectasis.         US-ABDOMEN LTD (SOFT TISSUE)   Final Result      No drainable fluid collection.      DX-CHEST-PORTABLE (1 VIEW)   Final Result      Decreased partially loculated right pleural fluid with increased pleural air. Right chest tube is in place.      No other significant change.       DX-CHEST-PORTABLE (1 VIEW)   Final Result         1.  Pulmonary edema and/or infiltrates are identified, which are somewhat decreased since the prior exam.   2.  Moderate loculated appearing right pleural effusion, slightly decreased since prior      DX-ABDOMEN FOR TUBE PLACEMENT   Final Result      Feeding tube tip at the distal stomach.      DX-CHEST-PORTABLE (1 VIEW)   Final Result         1.  New chest tube is noted in the right lower hemithorax.      2.  Right pleural effusion is again identified which appears similar to the prior exam.      3.  No new infiltrates or consolidations are identified.      DX-CHEST-PORTABLE (1 VIEW)   Final Result         1.  Pulmonary edema and/or infiltrates are identified, which are stable since the prior exam.   2.  Moderate loculated appearing right pleural effusion      DX-CHEST-PORTABLE (1 VIEW)   Final Result         No significant interval change.      POCT BUTTERFLY-ECHOCARDIOGRAM LTD W/O CONT    (Results Pending)        Assessment/Plan  * Bacteremia due to methicillin susceptible Staphylococcus aureus (MSSA)- (present on admission)  Assessment & Plan  Infectious disease assisting with antibiotic treatment  Multiple potential sources  Endocarditis  Need to achieve source control    Debility  Assessment & Plan  Acute on chronic, the patient likely need of LTAC treatment post acute treatment    Elevated alkaline phosphatase level- (present on admission)  Assessment & Plan  The patient not able to tolerate a MRCP at this time, ultrasound grossly unremarkable, observe    Agitation requiring sedation protocol- (present on admission)  Assessment & Plan  Monitor,    Goals of care, counseling/discussion  Assessment & Plan  Patient with overall high risk of morbidity and mortality given her gravely ill state and prior chronic medical conditions  Palliative care assisting    Spinal cord stimulator status  Assessment & Plan  Patient's stimulator is Nuve[employer]ra. It is FDA approved as  MRI compatible, but the company has gone out of business, so there is no support team to assist with MRI.  Thus, if MRI were performed, our radiologists would need to protocol it correctly to manage the stimulator. The [facility], [phone].  Information obtained from Dr. Mahoney's office, [phone].     Per  (5/4/2021), it is not MRI compatible unfortunately.     Anasarca  Assessment & Plan  Improved nutritional status, skin protective measures    Trapped lung  Assessment & Plan  With need for further thoracotomy, decortication    Pulmonary abscess (HCC)  Assessment & Plan  From septic emboli, antibiotic treatment, CT follow-up    Abnormal movement  Assessment & Plan  Apparently tardive dyskinesia, avoid offending agents    Fever  Assessment & Plan  Resolved, monitor, antibiotic therapy    Atelectasis  Assessment & Plan  Pulmonary recruiting, respiratory care    Pneumonia  Assessment & Plan  MSSA, ongoing treatment    History of vasculitis- (present on admission)  Assessment & Plan  Does not appear to be a current issue    CSF pleocytosis- (present on admission)  Assessment & Plan  Infectious disease managing    Empyema of right pleural space (HCC)- (present on admission)  Assessment & Plan  Patient s/p multiple chest tubes, thoracotomy and decortication  With recurrence unfortunately  Will need additional thoracic surgery with Dr. Ganser on 5/17    Acute bacterial endocarditis- (present on admission)  Assessment & Plan  Mitral valve, tricuspid valve, ongoing antibiotic therapy, source control    Acute blood loss anemia  Assessment & Plan  Likely secondary to hemothorax  300 cc of blood removed after chest tube placed at [facility]  Trend hemoglobin    Prolonged Q-T interval on ECG- (present on admission)  Assessment & Plan  Avoid medication affecting QTC, monitor    Acute respiratory failure with hypoxia (HCC)- (present on admission)  Assessment & Plan  Secondary to MSSA  bacteremia, empyema, pulmonary septic emboli  S/p tracheostomy  Ongoing respiratory care, pulmonary toilet, antibiotic treatment  Prolonged ventilator dependence    History of complicated migraines- (present on admission)  Assessment & Plan  Monitor    GERD (gastroesophageal reflux disease)- (present on admission)  Assessment & Plan  History of, as needed treatment    Hyponatremia  Assessment & Plan  Monitor    Tachycardia  Assessment & Plan  Persistent, likely secondary with multiple underlying ongoing conditions  Monitor    Pseudotumor cerebri  Assessment & Plan  History of  shunt, neurosurgery opted against removal    H/O: CVA (cerebrovascular accident)- (present on admission)  Assessment & Plan  Monitor    Chronic pain syndrome- (present on admission)  Assessment & Plan  Pre-existing, pain management    Thrombocytopenia (HCC)- (present on admission)  Assessment & Plan  Transient, resolved    Anemia  Assessment & Plan  Monitor, transfuse as indicated    Septic shock (HCC)- (present on admission)  Assessment & Plan  Multiple sources, recovered with aggressive treatment, IV antibiotics long-term    Sepsis (HCC)  Assessment & Plan  Multiple sources possible, recovered with medical treatment    Hypomagnesemia  Assessment & Plan  Monitor, replace and recheck    Hypokalemia  Assessment & Plan  Monitor, replace and recheck    Anxiety- (present on admission)  Assessment & Plan  Likely acute on chronic, monitor    S/P  shunt- (present on admission)  Assessment & Plan  History of, Dr. Oh felt there is currently no need to remove    Acute encephalopathy- (present on admission)  Assessment & Plan  Per neurosurgery no need to remove  shunt  Improving with ongoing medical treatment    Plan  Continue antibiotic therapy with the help of infectious disease  Continue prior psychiatric medication regimen  Pain control, multimodality  Will reduce scheduled narcotic medication  Attempt some anxiety control with  BuSpar  Continue with rehab efforts  Await additional surgery on Monday  See orders  Medically complex high risk patient  Overall appears prognostically poor given her widespread infection, debility, lack of infection source control at this time    VTE prophylaxis: Lovenox    I have performed a physical exam and reviewed and updated ROS and Plan today . In review of yesterday's note , there are no changes except as documented above.        Please note that this dictation was created using voice recognition software. I have made every reasonable attempt to correct obvious errors, but I expect that there are errors of grammar and possibly context that I did not discover before finalizing the note.

## 2021-05-15 ENCOUNTER — APPOINTMENT (OUTPATIENT)
Dept: RADIOLOGY | Facility: MEDICAL CENTER | Age: 51
DRG: 003 | End: 2021-05-15
Attending: HOSPITALIST
Payer: MEDICARE

## 2021-05-15 LAB
ANION GAP SERPL CALC-SCNC: 7 MMOL/L (ref 7–16)
BUN SERPL-MCNC: 14 MG/DL (ref 8–22)
CALCIUM SERPL-MCNC: 8.3 MG/DL (ref 8.5–10.5)
CHLORIDE SERPL-SCNC: 105 MMOL/L (ref 96–112)
CO2 SERPL-SCNC: 27 MMOL/L (ref 20–33)
CREAT SERPL-MCNC: <0.17 MG/DL (ref 0.5–1.4)
ERYTHROCYTE [DISTWIDTH] IN BLOOD BY AUTOMATED COUNT: 57.2 FL (ref 35.9–50)
GLUCOSE SERPL-MCNC: 83 MG/DL (ref 65–99)
HCT VFR BLD AUTO: 28.3 % (ref 37–47)
HGB BLD-MCNC: 8.8 G/DL (ref 12–16)
MAGNESIUM SERPL-MCNC: 1.8 MG/DL (ref 1.5–2.5)
MCH RBC QN AUTO: 29.4 PG (ref 27–33)
MCHC RBC AUTO-ENTMCNC: 31.1 G/DL (ref 33.6–35)
MCV RBC AUTO: 94.6 FL (ref 81.4–97.8)
PHOSPHATE SERPL-MCNC: 3.8 MG/DL (ref 2.5–4.5)
PLATELET # BLD AUTO: 306 K/UL (ref 164–446)
PMV BLD AUTO: 9.1 FL (ref 9–12.9)
POTASSIUM SERPL-SCNC: 3.3 MMOL/L (ref 3.6–5.5)
RBC # BLD AUTO: 2.99 M/UL (ref 4.2–5.4)
SODIUM SERPL-SCNC: 139 MMOL/L (ref 135–145)
WBC # BLD AUTO: 6.9 K/UL (ref 4.8–10.8)

## 2021-05-15 PROCEDURE — 700111 HCHG RX REV CODE 636 W/ 250 OVERRIDE (IP): Performed by: INTERNAL MEDICINE

## 2021-05-15 PROCEDURE — A9270 NON-COVERED ITEM OR SERVICE: HCPCS | Performed by: INTERNAL MEDICINE

## 2021-05-15 PROCEDURE — 700102 HCHG RX REV CODE 250 W/ 637 OVERRIDE(OP): Performed by: HOSPITALIST

## 2021-05-15 PROCEDURE — 84100 ASSAY OF PHOSPHORUS: CPT

## 2021-05-15 PROCEDURE — 94640 AIRWAY INHALATION TREATMENT: CPT

## 2021-05-15 PROCEDURE — 71045 X-RAY EXAM CHEST 1 VIEW: CPT

## 2021-05-15 PROCEDURE — 700102 HCHG RX REV CODE 250 W/ 637 OVERRIDE(OP): Performed by: INTERNAL MEDICINE

## 2021-05-15 PROCEDURE — 99232 SBSQ HOSP IP/OBS MODERATE 35: CPT | Performed by: HOSPITALIST

## 2021-05-15 PROCEDURE — 700105 HCHG RX REV CODE 258: Performed by: INTERNAL MEDICINE

## 2021-05-15 PROCEDURE — A9270 NON-COVERED ITEM OR SERVICE: HCPCS | Performed by: HOSPITALIST

## 2021-05-15 PROCEDURE — 770001 HCHG ROOM/CARE - MED/SURG/GYN PRIV*

## 2021-05-15 PROCEDURE — 83735 ASSAY OF MAGNESIUM: CPT

## 2021-05-15 PROCEDURE — 85027 COMPLETE CBC AUTOMATED: CPT

## 2021-05-15 PROCEDURE — 94760 N-INVAS EAR/PLS OXIMETRY 1: CPT

## 2021-05-15 PROCEDURE — 80048 BASIC METABOLIC PNL TOTAL CA: CPT

## 2021-05-15 RX ORDER — BUSPIRONE HYDROCHLORIDE 10 MG/1
10 TABLET ORAL 3 TIMES DAILY
Status: DISCONTINUED | OUTPATIENT
Start: 2021-05-15 | End: 2021-05-17

## 2021-05-15 RX ADMIN — ACETAMINOPHEN 500 MG: 325 TABLET, FILM COATED ORAL at 10:52

## 2021-05-15 RX ADMIN — GABAPENTIN 300 MG: 300 CAPSULE ORAL at 06:05

## 2021-05-15 RX ADMIN — RISPERIDONE 2 MG: 1 TABLET, FILM COATED ORAL at 17:59

## 2021-05-15 RX ADMIN — PAROXETINE HYDROCHLORIDE 10 MG: 20 TABLET, FILM COATED ORAL at 06:05

## 2021-05-15 RX ADMIN — DIVALPROEX SODIUM 500 MG: 125 CAPSULE ORAL at 06:04

## 2021-05-15 RX ADMIN — DIAZEPAM 5 MG: 5 TABLET ORAL at 21:09

## 2021-05-15 RX ADMIN — CEFEPIME 2 G: 2 INJECTION, POWDER, FOR SOLUTION INTRAVENOUS at 17:58

## 2021-05-15 RX ADMIN — DULOXETINE HYDROCHLORIDE 60 MG: 60 CAPSULE, DELAYED RELEASE ORAL at 17:59

## 2021-05-15 RX ADMIN — RISPERIDONE 2 MG: 1 TABLET, FILM COATED ORAL at 06:04

## 2021-05-15 RX ADMIN — ACETAMINOPHEN 500 MG: 325 TABLET, FILM COATED ORAL at 21:09

## 2021-05-15 RX ADMIN — METOPROLOL TARTRATE 25 MG: 25 TABLET, FILM COATED ORAL at 06:05

## 2021-05-15 RX ADMIN — GABAPENTIN 300 MG: 300 CAPSULE ORAL at 21:09

## 2021-05-15 RX ADMIN — CLONIDINE HYDROCHLORIDE 0.1 MG: 0.1 TABLET ORAL at 14:38

## 2021-05-15 RX ADMIN — OXYCODONE HYDROCHLORIDE 10 MG: 10 TABLET ORAL at 17:59

## 2021-05-15 RX ADMIN — ACETAMINOPHEN 500 MG: 325 TABLET, FILM COATED ORAL at 14:38

## 2021-05-15 RX ADMIN — BUSPIRONE HYDROCHLORIDE 10 MG: 10 TABLET ORAL at 17:59

## 2021-05-15 RX ADMIN — ENOXAPARIN SODIUM 40 MG: 40 INJECTION SUBCUTANEOUS at 06:04

## 2021-05-15 RX ADMIN — DIVALPROEX SODIUM 500 MG: 125 CAPSULE ORAL at 14:38

## 2021-05-15 RX ADMIN — CEFEPIME 2 G: 2 INJECTION, POWDER, FOR SOLUTION INTRAVENOUS at 10:52

## 2021-05-15 RX ADMIN — METOPROLOL TARTRATE 25 MG: 25 TABLET, FILM COATED ORAL at 17:59

## 2021-05-15 RX ADMIN — DULOXETINE HYDROCHLORIDE 60 MG: 60 CAPSULE, DELAYED RELEASE ORAL at 06:04

## 2021-05-15 RX ADMIN — POTASSIUM BICARBONATE 50 MEQ: 978 TABLET, EFFERVESCENT ORAL at 10:52

## 2021-05-15 RX ADMIN — POTASSIUM BICARBONATE 50 MEQ: 978 TABLET, EFFERVESCENT ORAL at 14:38

## 2021-05-15 RX ADMIN — BUSPIRONE HYDROCHLORIDE 10 MG: 10 TABLET ORAL at 11:04

## 2021-05-15 RX ADMIN — DIVALPROEX SODIUM 500 MG: 125 CAPSULE ORAL at 21:09

## 2021-05-15 RX ADMIN — METHADONE HYDROCHLORIDE 10 MG: 10 CONCENTRATE ORAL at 21:08

## 2021-05-15 RX ADMIN — DOCUSATE SODIUM 50 MG AND SENNOSIDES 8.6 MG 2 TABLET: 8.6; 5 TABLET, FILM COATED ORAL at 06:05

## 2021-05-15 RX ADMIN — TRAMADOL HYDROCHLORIDE 50 MG: 50 TABLET ORAL at 04:10

## 2021-05-15 RX ADMIN — OXYCODONE HYDROCHLORIDE 10 MG: 10 TABLET ORAL at 06:04

## 2021-05-15 RX ADMIN — METHADONE HYDROCHLORIDE 10 MG: 10 CONCENTRATE ORAL at 06:04

## 2021-05-15 RX ADMIN — GABAPENTIN 300 MG: 300 CAPSULE ORAL at 14:38

## 2021-05-15 RX ADMIN — METHADONE HYDROCHLORIDE 10 MG: 10 CONCENTRATE ORAL at 14:38

## 2021-05-15 RX ADMIN — CLONIDINE HYDROCHLORIDE 0.1 MG: 0.1 TABLET ORAL at 21:09

## 2021-05-15 RX ADMIN — BUSPIRONE HYDROCHLORIDE 5 MG: 10 TABLET ORAL at 06:05

## 2021-05-15 RX ADMIN — OXYCODONE HYDROCHLORIDE 10 MG: 10 TABLET ORAL at 23:25

## 2021-05-15 RX ADMIN — CLONIDINE HYDROCHLORIDE 0.1 MG: 0.1 TABLET ORAL at 06:04

## 2021-05-15 RX ADMIN — OXYCODONE HYDROCHLORIDE 10 MG: 10 TABLET ORAL at 11:04

## 2021-05-15 RX ADMIN — CEFEPIME 2 G: 2 INJECTION, POWDER, FOR SOLUTION INTRAVENOUS at 02:24

## 2021-05-15 ASSESSMENT — ENCOUNTER SYMPTOMS
ABDOMINAL PAIN: 0
FEVER: 0

## 2021-05-15 ASSESSMENT — FIBROSIS 4 INDEX: FIB4 SCORE: 0.65

## 2021-05-15 ASSESSMENT — PAIN DESCRIPTION - PAIN TYPE
TYPE: CHRONIC PAIN

## 2021-05-15 NOTE — PROGRESS NOTES
Infectious Disease Progress Note    Author: Dixie Albarran M.D. Date & Time created: 5/15/2021  12:10 PM  Cefepime 4/23 -  current Day #20     Thoracoscopy & Decortication - 4/28     PRIOR Rx:   Zosyn 4/22-4/23  Previous: Unasyn, Zosyn, Vanco, Daptomycin  Ceftaroline: start 4/13-4/15  Nafcillin 2g Q4 hrs 4/15-4/23 4/14: Unable to give name of previous NSG or neurologist. Seems confused, but able to say some sentences fluently.   4/15: Line cultures now growing MSSA. As well as from the blood. Repeat blood culture 4/13+ in both sets. Patient has worsening confusion. CSF fluid analyses suggest pleocytosis. Cultures are no growth from the CSF. Chest CT was ordered this morning indicating a loculated right pleural effusion as well as bilateral septic emboli. Dr. Mack spoke with Dr. Oh yesterday and no surgical intervention unless clinical deterioration.  4/16: Increased respiratory distress.  Tachypneic.  CT with loculated collection.  Dr Resendiz not available.  No thoracic surgeon available.  Plans to have pulmonary place CT.  Transferring to ICU.  4/17: Transferred to [facility] last night. Hgb dropped to 6.4 requiring PRBC transfusion. Requiring 2 pressors. Fio2 50%. Febrile 38.8. Pending OR decortication of empyema and hemothorax. Chest tube placed at [facility] shows 8,371 WBC, ,000, 74% N  4/18: Chest tube was upsized by surgery yesterday, no decortication. WBC 22k. Fio2 40%. Vasopressin. Levophed down to 2mcg. Afebrile 24 hrs. Last fever 38.6 on 4/16.   4/19: Still on vent. Levo 3 mcg, vasopressin. Afebrile 72 hours. WBC 21k. BC 4/17 NGTD x 48 hours. Unable to do MRI brain given neurostimulator per RN.   4/20: Remaining afebrile. Hb 6.2 and undergoing transfusion today.WBC 24,100, 5% bands.1700 ml CT output. Copious bloody ET secretions. No pressors. Receiving dornase and TPA per CT. Right pleural effusion not large per CXR today.   4/21: WBC 31k. Bronchoscopy yesterday showing blood. Cultures sent. TPA on  hold per RN due to arvind blood from chest tube requiring PRBC transfusion for hgb 6. Pending chest CT today. Per , spinal stimulator is non-MRI compatible. Levophed 10mcg. 30% Fio2. Afebrile.   4/22: Fever 101.3 this am. WBC 20,300 down from 32,200 yesterday. BAL growing Klebsiella pneumoniae but susceptibility panel not set up until today. CTA of brain shows no lesion. CT of chest shows enlarging cavitary lung lesions bilat and large loculated new left pleural effusion and large hydropneumothorax on right. TPA & dornase infusions of right chest ended 4/20 after considerable bleeding requiring transfusion. Hb now down again to 6.8.  4/23: WBC 23k. Fio2 40%. Tmax 38.1. US of stiumlator shows small fluid collection but no drainable abscess. Pending MICAH today. Pending L chest tube placement  4/24: Continue fever 100.8-101.8. WBC 16,600. MICAH shows large vegetations on both tricuspid valve and mitral valve with mild TR & MR.  No aortic root abscess. Left chest tube placed yesterday yielding 8 ml of old bloody fluid. Bronch today revealed copious thick maroon secretions in distal trachea and both mainstem bronchi. On the right these were obstructive. Remaining off pressors. Last positive blood cultures (MSSA) were 4/16, negative 4/17.  4/25: Fever 100.6 this AM. wBC 13,300. Hb 6.6. CT: right hydropneumothorax increasing, right bronchopleural fistula, mediastinal shift ot the left , multiple cavitary pulmonary nodules, 3.1 cm left basilar mass, splenomegaly. CT of head shows dural thickening over the clivus. Lac+ GNR >100,000 col/ml growing from BAL of 4/24, presumed Klebsiella. Left peural fluid culture negative from 4/23.  4/26: Fever 100.4 last night. WBC 13,500. Hb 7.4. Plts 502,000. ESR >120. UA fairly clear except 30 mg% protein, 2-5 wbc and rare rbc. CXR unchanged except more prominent pulmonary edema. GNR in BAL may be a different species of Klebsiella, and resistant to ampicillin & tmp-sulfa; confirmation  pending.  4/28: Fever 100.8 last night. WBC 27,700. Hb 6.6. Plts 482,000. Creat 0.19. Kleb pneum in BAL on 4/24 is resistant to ampicillin & tmp-sulfa but otherwise sensitive. O2 sat 96% on FIO2 30% now, PEEP 8. Has been re-evaluated by thoracic surgeon, Dr. Ganser, who believes she will not wean without decortication on the right. Surgery anticipated today.  4/29: S/P right thoracoscopy with decortication with cultures NG at 24 hrs.  4/30: Had a bronch due to hypoxia and hypotension. CXR with worsening right hemithorax pulmonary opacities. Bloody mucous plugs removed. Pressors started. Febrile this am. Tachy in 130s. Pressors just removed. Tolerating vent, but not a SBT candidate at the moment.  5/1: Small air leak CT-2 every ~ 2/3 breathes. The K. pneumoniae from her thoracoscopy is sensitive to her cefepime so no antibiotic change needed.   5/2: No air leak today she tolerated 2  hrs of spontaneous breathing with support today.      5/3: Second day with SBT and doing well now for 2 hrs. Slight bump in temperature and      WBC's but no obvious clinical infectious changed so watch closely.      5/4: Fever still from time to time. WBCs trending up. Chest tubes in place. Trach.       5/5: She clearly is better today she was sitting up in bed with open eyes and follows commands. She still has low grade fever but her WBC's are dropping. Cefepime dose increased yesterday for increased CNS coverage if necessary. She will probably need further thoracic surgery as mentioned by Dr. Ganser. The issue of what to do with her stimulator is still up in the air for now as it probably will need to be removed but she is nort a candidate for more major surgery at this time.       5/7: She continues to improve and under go longer SBT trials. She just had a new PICC placed in her right arm and plan is to D/C central line.        5/8: PICC placed. No fevers. Comfortable. Cortrak placed. Failing SBTs.  5/9: No acute issues. No fevers.  Comfortable. Family at bedside.   5/10: No acute issues, fevers or WBC bumps. No changes in condition. She is to get her CT scan today and be reviewed by surgery  5/11:  at bedside.  Has been referred to LANE.  Repeat CT scan completed.  Results noted.  ? If Dr Ganser has seen.  Still with an empyema:  ? If candidate for further surgery.  5/12: Pending next thoracic surgery. No fever. Anxious.  5/13: ? Surgery date.  No one seems to know.  Had speaking valve in and having swallow eval with speech therapy  5/14: No acute issues. No fevers. Surgery Monday.  5/15: One CT accidentally pulled out yesterday.  Has been on T piece and tolerating. Plans for surgery Monday afternoon.  Interval History:  Past 24 hrs reviewed with RN.    Labs Reviewed, Medications Reviewed, Radiology Reviewed, Wound Reviewed, Fluids Reviewed and GI Nutrition Reviewed    Review of Systems:  Review of Systems   Constitutional: Negative for fever.   Cardiovascular: Negative for chest pain.   Gastrointestinal: Negative for abdominal pain.       Physical Exam:  Physical Exam  Constitutional:       Appearance: Normal appearance.   HENT:      Nose:      Comments: NG  Neck:      Comments: ET  Cardiovascular:      Rate and Rhythm: Normal rate and regular rhythm.   Pulmonary:      Breath sounds: Rhonchi present.      Comments: CT x 1.  Second CT accidentally pulled out last night.  Abdominal:      General: Abdomen is flat.   Skin:     General: Skin is warm and dry.   Neurological:      General: No focal deficit present.      Mental Status: She is alert.         Labs:  Recent Results (from the past 24 hour(s))   CBC WITHOUT DIFFERENTIAL    Collection Time: 05/15/21  2:29 AM   Result Value Ref Range    WBC 6.9 4.8 - 10.8 K/uL    RBC 2.99 (L) 4.20 - 5.40 M/uL    Hemoglobin 8.8 (L) 12.0 - 16.0 g/dL    Hematocrit 28.3 (L) 37.0 - 47.0 %    MCV 94.6 81.4 - 97.8 fL    MCH 29.4 27.0 - 33.0 pg    MCHC 31.1 (L) 33.6 - 35.0 g/dL    RDW 57.2 (H) 35.9 - 50.0 fL  "   Platelet Count 306 164 - 446 K/uL    MPV 9.1 9.0 - 12.9 fL   Magnesium    Collection Time: 05/15/21  2:29 AM   Result Value Ref Range    Magnesium 1.8 1.5 - 2.5 mg/dL   Basic Metabolic Panel    Collection Time: 05/15/21  2:29 AM   Result Value Ref Range    Sodium 139 135 - 145 mmol/L    Potassium 3.3 (L) 3.6 - 5.5 mmol/L    Chloride 105 96 - 112 mmol/L    Co2 27 20 - 33 mmol/L    Glucose 83 65 - 99 mg/dL    Bun 14 8 - 22 mg/dL    Creatinine <0.17 (L) 0.50 - 1.40 mg/dL    Calcium 8.3 (L) 8.5 - 10.5 mg/dL    Anion Gap 7.0 7.0 - 16.0   PHOSPHORUS    Collection Time: 05/15/21  2:29 AM   Result Value Ref Range    Phosphorus 3.8 2.5 - 4.5 mg/dL   ESTIMATED GFR    Collection Time: 05/15/21  2:29 AM   Result Value Ref Range    GFR If African American >60 >60 mL/min/1.73 m 2    GFR If Non African American >60 >60 mL/min/1.73 m 2     Results     Procedure Component Value Units Date/Time    BLOOD CULTURE [[id number]] Collected: 21 1200    Order Status: Completed Specimen: Blood from Peripheral Updated: 21 1300     Significant Indicator NEG     Source BLD     Site PERIPHERAL     Culture Result No growth after 5 days of incubation.    Narrative:      Collected By:[id number] FERNANDA WISE  Per Hospital Policy: Only change Specimen Src: to \"Line\" if  specified by physician order.  Left AC    BLOOD CULTURE [[id number]] Collected: 21 1150    Order Status: Completed Specimen: Blood from Peripheral Updated: 21 1300     Significant Indicator NEG     Source BLD     Site PERIPHERAL     Culture Result No growth after 5 days of incubation.    Narrative:      Collected By:[id number] FERNANDA WISE  Per Hospital Policy: Only change Specimen Src: to \"Line\" if  specified by physician order.  Right Hand        Hemodynamics:  Temp (24hrs), Av.3 °C (97.4 °F), Min:36.2 °C (97.2 °F), Max:36.4 °C (97.5 °F)  Temperature: 36.4 °C (97.5 °F)  Pulse  Av.2  Min: 40  Max: 149   Blood Pressure: 143/79    PICC Double " Lumen 05/07/21 Lumen 1: Purple Lumen 2: Red Right Brachial (Active)   Site Assessment Clean;Dry;Intact 05/15/21 0900   Lumen 1 Status Blood return noted;Flushed;Normal saline locked;Scrubbed the hub prior to access 05/15/21 0900   Lumen 2 Status Blood return noted;Flushed;Normal saline locked;Scrubbed the hub prior to access 05/15/21 0900   Line Secured at (cm) 1 cm 05/07/21 1118   Extremity Circumference (cm) 30.5 cm 05/07/21 1118   Dressing Type Biopatch;Securing device;Skin barrier;Transparent 05/15/21 0900   Dressing Status Clean;Dry;Intact 05/15/21 0900   Dressing Intervention N/A 05/12/21 2000   Dressing Change Due 05/15/21 05/11/21 2000   Date Primary Tubing Changed 05/11/21 05/11/21 0800   Date Secondary Tubing Changed 05/11/21 05/11/21 0800   Date IV Connector(s) Changed 05/15/21 05/11/21 0800   NEXT IV Connector(s) Change 05/08/21 05/07/21 1118   Line Necessity Assessed Antibiotic Therapy Greater than 7 Days 05/15/21 0900   $ Double Lumen PICC Charge Single kit used 05/07/21 1118     Wound:  @WOUNDLDA(4)@     Fluids:  Intake/Output                             05/13/21 0700 - 05/14/21 0659 05/14/21 0700 - 05/15/21 0659 05/15/21 0700 - 05/16/21 0659     0700-1859 1900-0659 Total 0700-1859 1900-0659 Total 0700-1859 1900-0659 Total                    Intake    Other  220  -- 220  --  -- --  30  -- 30    Medications (PO/Enteral Liquids) -- -- -- -- -- -- 30 -- 30    Flush / Irrigation Volume (< Enter Name Here >) 220 -- 220 -- -- -- -- -- --    NG/GT  450  90 540  540  450 990  --  -- --    Intake (mL) (Enteral Tube 05/11/21 Cortrak - Gastric) 450 90 540 540 450 990 -- -- --    Total Intake 670 90 760 540 450 990 30 -- 30       Output    Urine  300  -- 300  200  700 900  --  -- --    Number of Times Voided -- 1 x 1 x 1 x -- 1 x -- -- --    Urine Void (mL) 300 -- 300 200 100 300 -- -- --    Output (mL) (External Urinary Catheter (Female Wick)) -- -- -- -- 600 600 -- -- --    Stool  --  -- --  --  -- --  --  --  --    Number of Times Stooled -- 1 x 1 x -- -- -- -- -- --    Chest Tube  20  0 20  15  0 15  --  -- --    Output (mL) ([REMOVED] Chest Tube 2 Right Midaxillary) 20 0 20 15 -- 15 -- -- --    Output (mL) (Chest Tube 1 Right Midaxillary;Pleural) 0 0 0 0 0 0 -- -- --    Total Output 320 0 320 215 700 915 -- -- --       Net I/O     350 90 440 325 -250 75 30 -- 30        Weight: 63.6 kg (140 lb 3.4 oz)  GI/Nutrition:  Orders Placed This Encounter   Procedures   • Diet NPO     Standing Status:   Standing     Number of Occurrences:   1     Order Specific Question:   Restrict to:     Answer:   Strict [1]     Medications:  Current Facility-Administered Medications   Medication Last Admin   • busPIRone (BUSPAR) tablet 10 mg 10 mg at 05/15/21 1104   • potassium bicarbonate (KLYTE) effervescent tablet 50 mEq 50 mEq at 05/15/21 1052   • oxyCODONE immediate release (ROXICODONE) tablet 10 mg 10 mg at 05/15/21 1104   • metoprolol tartrate (LOPRESSOR) tablet 25 mg 25 mg at 05/15/21 0605   • diazePAM (VALIUM) tablet 5 mg 5 mg at 05/14/21 2142   • ondansetron (ZOFRAN) syringe/vial injection 4 mg 4 mg at 05/13/21 0431   • acetaminophen (TYLENOL) tablet 500 mg 500 mg at 05/15/21 1052   • oxyCODONE immediate-release (ROXICODONE) tablet 5 mg     • methadone (DOLOPHINE) 10 MG/ML solution 10 mg 10 mg at 05/15/21 0604   • gabapentin (NEURONTIN) capsule 300 mg 300 mg at 05/15/21 0605   • cloNIDine (CATAPRES) tablet 0.1 mg 0.1 mg at 05/15/21 0604   • traMADol (ULTRAM) 50 MG tablet 50 mg 50 mg at 05/15/21 0410   • cefepime (MAXIPIME) 2 g in  mL IVPB Stopped at 05/15/21 1122   • labetalol (NORMODYNE/TRANDATE) injection 10 mg 10 mg at 05/04/21 0226   • enoxaparin (LOVENOX) inj 40 mg 40 mg at 05/15/21 0604   • Pharmacy Consult: Enteral tube insertion - review meds/change route/product selection     • acetaminophen (Tylenol) tablet 650 mg 650 mg at 05/12/21 1233   • magnesium hydroxide (MILK OF MAGNESIA) suspension 30 mL      And   •  senna-docusate (PERICOLACE or SENOKOT S) 8.6-50 MG per tablet 2 tablet 2 tablet at 05/15/21 0605    And   • polyethylene glycol/lytes (MIRALAX) PACKET 1 Packet 1 Packet at 05/11/21 0525    And   • bisacodyl (DULCOLAX) suppository 10 mg     • divalproex (DEPAKOTE SPRINKLE) capsule 500 mg 500 mg at 05/15/21 0604   • DULoxetine (CYMBALTA) capsule 60 mg 60 mg at 05/15/21 0604   • PARoxetine (PAXIL) tablet 10 mg 10 mg at 05/15/21 0605   • risperiDONE (RISPERDAL) tablet 2 mg 2 mg at 05/15/21 0604   • Respiratory Therapy Consult     • MD Alert...ICU Electrolyte Replacement per Pharmacy     • ipratropium-albuterol (DUONEB) nebulizer solution       Medical Decision Making, by Problem:  Active Hospital Problems    Diagnosis    • *Bacteremia due to methicillin susceptible Staphylococcus aureus (MSSA) [R78.81, B95.61]    • Agitation requiring sedation protocol [R45.1]    • 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]    • Acute respiratory failure with hypoxia (HCC) [J96.01]    • Acute bacterial endocarditis [I33.0]    • Empyema of right pleural space (HCC) [J86.9]    • Acute encephalopathy [G93.40]    • Fever [R50.9]    • Pleural effusion, left [J90]    • Atelectasis [J98.11]    • CSF pleocytosis [D72.9]    • Pneumonia [J18.9]    • Tachycardia [R00.0]    • Pseudotumor cerebri [G93.2]    • S/P  shunt [Z98.2]    • Prolonged Q-T interval on ECG [R94.31]    • History of vasculitis [Z86.79]    • History of complicated migraines [G43.109]    • Hyponatremia [E87.1]    • H/O: CVA (cerebrovascular accident) [Z86.73]    • Chronic pain syndrome [G89.4]    • Thrombocytopenia (HCC) [D69.6]    • Anxiety [F41.9]    • Spinal cord stimulator status [Z96.89]    • Goals of care, counseling/discussion [Z71.89]    • Abnormal movement [G25.9]    • Pulmonary abscess (HCC) [J85.2]    • Anasarca [R60.1]    • Thrombocytosis (HCC) [D47.3]    • GERD (gastroesophageal reflux disease) [K21.9]    • Septic shock (HCC) [A41.9, R65.21]    • Anemia [D64.9]    • Hypokalemia [E87.6]       Impression   -Severe sepsis  -Catheter related bloodstream infection due to infected port status post removal 4/12-staph aureus  -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       - Acute left empyema s/p chest tube placement 4/23  -Multiple acute septic pulmonary emboli bilaterally  -Acute bilateral pneumonia due to above     --Pulmonary edema  -Pseudotumor cerebri with underlying  shunt: possible arachnoiditis  -Chronic migraine headaches  -History of autoimmune vasculitis  -Elevated alkaline phosphatase & bilirubin  -Acute encephalopathy due to sepsis      - anemia due to above      - New secondary Klebsiella pneumoniae pneumonia (presumed VAP)     Plan   - Continue cefepime - clinical endpoint - Empyema needs debridement.   - Monday for surgery   - Would appreciate cultures at that time.  - Has had cefepime since 4/28 to cover Kleb - this should be treated by now   - Will continue through the surgery and if no new organisms assess if we can go back to MSSA coverage alone  - Vent management per pulmonology - SBT trials  - Tolerated the decortication. Cx NGTD.   - No IV abx changes or further ID workup  - Continue to monitor closely  - Improving overall.   - Current abx covering CNS and back hardware.    - This HW is not coming out anytime soon per NS.    - Dispo:         - Pending tolerance of surgery.  Referrals have been made to LANE.  Will see how she does post surgery.    Case and treatment reviewed with patient(best possible) and RN    > 30 min on floor in ICU with > 50% face to face view and 100% in direct patient care/counseling today.

## 2021-05-15 NOTE — CARE PLAN
Problem: Oxygenation:  Goal: Maintain adequate oxygenation dependent on patient condition  Outcome: Progressing  Note: T-piece 7L/35%

## 2021-05-15 NOTE — PROGRESS NOTES
Patient pulled out feeding tube partially. Tube feeding stopped and tube reinserted to original marker and kub ordered.

## 2021-05-15 NOTE — PROGRESS NOTES
Notified Dr. Armstrong of CXR results, no pneumo, stable chest. Orders given to do repeat CXR in AM.  Monitoring patient for S/S of changes in work of breathing.

## 2021-05-15 NOTE — PROGRESS NOTES
Hospital Medicine Daily Progress Note    Date of Service  5/15/2021    Chief Complaint  50 y.o. female admitted 4/16/2021 with empyema with MSSA, complicated by respiratory failure and thoracic surgery consultation, transferred from an outlying facility namely [facility]    Hospital Course  This is a 50-year-old female with a past medical history of pseudotumor cerebri s/p  shunt implantation by Dr. Oh neurosurgery, chronic pain syndrome with a history of placement of a nerve stimulator, vasculitis, right anterior chest port for home IV medication administration, recurrent infections related to port, presented on 4/11 to [facility] with recurrent port infection was initially treated with vancomycin and Zosyn and then changed to ceftaroline subsequently switched to nafcillin, MSSA identified.  Serum infectious disease is managing antibiotics for the patient.  Dr. Candelaria surgery removed the port on 4/12.  The patient was further identified to have endocarditis of the tricuspid valve, a lumbar puncture performed on 4/14 showed pleocytosis with negative Gram stain.  The patient suffered worsening leukocytosis and was found to have septic emboli per CT.  Initially a small pleural catheter was placed.  Significant loculations were encountered.  MSSA empyema was diagnosed.  Neurosurgery was consulted to comment on possibly removal of a  shunt, this was felt not appropriate at this time.  The patient remained on mechanical ventilation for 15 days, then a perc trach was placed.  The patient was eventually liberated from mechanical ventilation, the patient did have a right thoracoscopy and decortication of the lung performed on 4/28 by Dr. Ganser.  The patient was of identified to have multiple sources for MSSA including mitral valve, tricuspid valve vegetation, port, spinal stimulator,  shunt possibly.  The patient is slowly improving, she is significant chronic pain  issues, it was determined that her loculated pleural fluid is returning and the patient will need more extensive thoracotomy/pleurodesis on 5/17 with Dr. Ganser.  The patient was referred to LTAC pending additional surgical interventions to be completed    Interval Problem Update  Patient seen and examined today.    Patient tolerating treatment and therapies.  All Data, Medication data reviewed.  Case discussed with nursing as available.  Plan of Care reviewed with patient and notified of changes.  5/13 the patient appears significantly lethargic, possible medication effects, she is able to awaken and respond some, she is complaining of back pain, afebrile, heart rate in the 1 teens to 130s, respiration in the 15 range unlabored, the patient is on 7 L T-piece, 96%, blood pressure 108/64, improving anemia hemoglobin 9.8, chemistry fairly unremarkable except for alk phos of 741 which is improving,  5/14 the patient states that she has much anxiety, less so pain today, she was informed about the surgical plans for Monday, medication adjusted, still appears very lethargic and debilitated and weak  5/15 the patient continues to be lethargic, complains still of anxiety, restlessness and poor sleep, a chest tube was accidentally pulled out last evening, chest x-ray remained unchanged, and the area was secured with a occlusive dressing, this morning the patient removed/pulled her core track, reinserted, low potassium noted, ongoing lethargy, chronic illness and debility.  Awaiting additional surgery on Monday.  Consultants/Specialty  Critical care/pulmonary  Infectious disease  Thoracic surgery  Palliative care    Code Status  Full Code    Disposition  Will likely postacute treatment at an LTAC level    Review of Systems  Review of Systems   Unable to perform ROS: Mental acuity        Physical Exam  Temp:  [36.2 °C (97.2 °F)-36.4 °C (97.5 °F)] 36.4 °C (97.5 °F)  Pulse:  [] 116  Resp:  [10-27] 20  BP: ()/(50-94)  143/79  SpO2:  [92 %-98 %] 96 %    Physical Exam  Vitals and nursing note reviewed.   Constitutional:       Appearance: She is well-developed and normal weight. She is ill-appearing. She is not diaphoretic.      Interventions: Cervical collar in place.      Comments: Lethargic, chronically ill-appearing female, looks much older than stated age   HENT:      Head: Normocephalic and atraumatic.      Nose: Nose normal.      Comments: Core track     Mouth/Throat:      Mouth: Mucous membranes are dry.   Eyes:      Conjunctiva/sclera: Conjunctivae normal.      Pupils: Pupils are equal, round, and reactive to light.   Neck:      Thyroid: No thyromegaly.      Vascular: No JVD.      Comments: Tracheostomy  Cardiovascular:      Rate and Rhythm: Normal rate and regular rhythm.      Heart sounds: Normal heart sounds. No friction rub. No gallop.    Pulmonary:      Effort: Pulmonary effort is normal.      Breath sounds: Rhonchi and rales present. No wheezing.      Comments: 2 chest tubes in place with serosanguineous fluid  Abdominal:      General: Bowel sounds are normal. There is no distension.      Palpations: Abdomen is soft. There is no mass.      Tenderness: There is no abdominal tenderness. There is no guarding or rebound.   Musculoskeletal:         General: No tenderness. Normal range of motion.      Cervical back: Normal range of motion and neck supple.   Lymphadenopathy:      Cervical: No cervical adenopathy.   Skin:     General: Skin is warm and dry.   Neurological:      General: No focal deficit present.      Mental Status: She is lethargic and disoriented.      Cranial Nerves: No cranial nerve deficit.      Motor: Weakness present.         Fluids    Intake/Output Summary (Last 24 hours) at 5/15/2021 1125  Last data filed at 5/15/2021 1100  Gross per 24 hour   Intake 840 ml   Output 915 ml   Net -75 ml       Laboratory  Recent Labs     05/13/21  0442 05/14/21  0118 05/15/21  0229   WBC 10.0 7.1 6.9   RBC 3.36* 3.22*  2.99*   HEMOGLOBIN 9.8* 9.1* 8.8*   HEMATOCRIT 32.1* 31.3* 28.3*   MCV 95.5 97.2 94.6   MCH 29.2 28.3 29.4   MCHC 30.5* 29.1* 31.1*   RDW 59.7* 60.1* 57.2*   PLATELETCT 374 319 306   MPV 9.2 9.2 9.1     Recent Labs     05/13/21  0442 05/14/21  0118 05/15/21  0229   SODIUM 138 139 139   POTASSIUM 4.0 3.7 3.3*   CHLORIDE 103 104 105   CO2 28 27 27   GLUCOSE 107* 104* 83   BUN 15 17 14   CREATININE 0.22* <0.17* <0.17*   CALCIUM 9.5 8.6 8.3*                   Imaging  DX-ABDOMEN FOR TUBE PLACEMENT   Final Result      Feeding tube tip projects over the expected location the gastric antrum.      DX-CHEST-PORTABLE (1 VIEW)   Final Result         1.  Hazy right pulmonary infiltrates and/or effusion, similar compared to prior study.      DX-CHEST-LIMITED (1 VIEW)   Final Result      1.  No pneumothorax. Only one chest tube is now seen in the right lung.   2.  The chest is otherwise stable.      DX-CHEST-PORTABLE (1 VIEW)   Final Result         1.  Hazy right pulmonary infiltrates and/or effusion, similar compared to prior study.      DX-CHEST-PORTABLE (1 VIEW)   Final Result         1.  Hazy right pulmonary infiltrates and/or effusion, similar compared to prior study.      DX-ABDOMEN FOR TUBE PLACEMENT   Final Result         1.  Nonspecific bowel gas pattern.   2.  Dobbhoff tube tip terminates overlying the expected location of the gastric antrum.   3.  Hazy bilateral lung base infiltrates, greater on the right.      DX-CHEST-PORTABLE (1 VIEW)   Final Result         1.  Hazy right pulmonary infiltrates and/or effusion, similar compared to prior study.      US-RUQ   Final Result         1.  Hepatomegaly   2.  Mild intrahepatic and extrahepatic biliary ductal dilatation, commonly associated with postcholecystectomy physiology, consider causes of biliary obstruction with additional workup as clinically appropriate      CT-CHEST,ABDOMEN,PELVIS WITH   Final Result      1.  Large empyema in the RIGHT hemithorax, slightly decreased  in size from prior exam with chest tubes present.   2.  Consolidation remains.   3.  Multiple cavitary lesions again seen in the LEFT upper lobe, minimally decreased from prior, concerning for septic emboli.   4.  Supportive tubing is unchanged.   5.  Intrahepatic and extrahepatic biliary dilation, likely postoperative although distal stricture, stone or mass remain possibilities.   6.  Moderate pelvic fluid, nonspecific.   7.  No evidence of bowel obstruction or perforation.      DX-CHEST-PORTABLE (1 VIEW)   Final Result         1.  Hazy right pulmonary infiltrates and/or effusion, similar compared to prior study.      DX-CHEST-PORTABLE (1 VIEW)   Final Result         No significant interval change.            DX-CHEST-PORTABLE (1 VIEW)   Final Result         Interval removal of left central venous catheter. Interval adjustment of right PICC with tip in the SVC.         DX-ABDOMEN FOR TUBE PLACEMENT   Final Result      Feeding tube tip at the mid to distal stomach.      IR-PICC LINE PLACEMENT W/ GUIDANCE > AGE 5   Final Result                  Ultrasound-guided PICC placement performed by qualified nursing staff as    above.          DX-CHEST-PORTABLE (1 VIEW)   Final Result      1.  Stable cardiopulmonary findings.   2.  See comments above regarding the right-sided PICC position.      DX-CHEST-PORTABLE (1 VIEW)   Final Result      Stable chest findings compared to 5/5.      DX-CHEST-PORTABLE (1 VIEW)   Final Result      Stable chest findings compared with 5/3.      DX-ABDOMEN FOR TUBE PLACEMENT   Final Result         Feeding tube with tip projecting over the expected area of the stomach.      DX-CHEST-PORTABLE (1 VIEW)   Final Result      Stable chest findings compared with prior.      DX-CHEST-PORTABLE (1 VIEW)   Final Result         1. No significant interval change.      US-EXTREMITY ARTERY LOWER UNILAT RIGHT   Final Result      DX-CHEST-PORTABLE (1 VIEW)   Final Result         1. No significant interval  change.      IR-PICC LINE PLACEMENT W/ GUIDANCE > AGE 5   Final Result                  Ultrasound-guided PICC placement performed by qualified nursing staff as    above.          DX-CHEST-PORTABLE (1 VIEW)   Final Result         1.  Pulmonary edema and/or infiltrates, similar to prior study.   2.  Stable opacification right lung, likely effusion. Thoracostomy tubes are in place.   3.  Multiple cavitary appearing left pulmonary lesions, see CT performed April 27, 2021 for further characterization      DX-CHEST-PORTABLE (1 VIEW)   Final Result         1.  Pulmonary edema and/or infiltrates, similar to prior study.   2.  Single opacification right lung, likely effusion. Thoracostomy tubes are in place.   3.  Multiple cavitary appearing left pulmonary lesions, see CT performed April 27, 2021 for further characterization   4.  Soft tissue gas in the right chest wall      DX-CHEST-PORTABLE (1 VIEW)   Final Result         1.  Pulmonary edema and/or infiltrates, similar to prior study.   2.  Increased opacification right lung, likely increased effusion. Thoracostomy tubes are in place.   3.  Multiple cavitary appearing left pulmonary lesions, see CT performed April 27, 2021 for further characterization   4.  Soft tissue gas in the right chest wall      DX-CHEST-PORTABLE (1 VIEW)   Final Result         1.  Pulmonary edema and/or infiltrates, similar to prior study.   2.  Right pneumothorax, stable compared to prior study, right thoracostomy tubes remain in place   3.  Multiple cavitary appearing left pulmonary lesions, see CT performed April 27, 2021 for further characterization   4.  Soft tissue gas in the right chest wall      DX-CHEST-PORTABLE (1 VIEW)   Final Result         1.  Pulmonary edema and/or infiltrates, similar to prior study.   2.  Right pneumothorax, interval decreased compared to prior study, interval placement of right thoracostomy tubes is noted.   3.  Multiple cavitary appearing left pulmonary lesions,  see CT performed yesterday for further characterization   4.  Soft tissue gas in the right chest wall      DX-CHEST-PORTABLE (1 VIEW)   Final Result         1.  Pulmonary edema and/or infiltrates, similar to prior study.   2.  Right pneumothorax, stable compared to prior study, interval removal of right thoracostomy tube is noted.   3.  Multiple cavitary appearing left pulmonary lesions, see CT performed yesterday for further characterization   4.  Soft tissue gas in the right chest wall      DX-CHEST-LIMITED (1 VIEW)   Final Result         No significant interval change.      CT-CTA CHEST PULMONARY ARTERY W/ RECONS   Final Result         No CT evidence of pulmonary embolus.      Previous right chest tube were removed.      Grossly similar in size of the loculated right hydropneumothorax but there is increased layering fluid component. Unchanged mild shift of the mediastinal to the left.      Redemonstration of a pigtail catheter in the medial left lung base. Grossly similar multiple cavitary lesions in the left lung.      US-EXTREMITY VENOUS LOWER BILAT   Final Result      POCT BUTTERFLY-DUPLEX SCAN EXTREMITY VEINS LIMITED   Final Result      DX-CHEST-PORTABLE (1 VIEW)   Final Result         1.  Pulmonary edema and/or infiltrates, similar to prior study.   2.  Right pneumothorax, somewhat decreased to prior study, interval removal of right thoracostomy tube is noted.   3.  Multiple cavitary appearing left pulmonary lesions, see CT performed yesterday for further characterization   4.  Soft tissue gas in the right chest wall      DX-CHEST-PORTABLE (1 VIEW)   Final Result         1.  Pulmonary edema and/or infiltrates, similar to prior study.   2.  Right pneumothorax, similar to prior study with thoracostomy tube in place.   3.  Multiple cavitary appearing left pulmonary lesions, see CT performed yesterday for further characterization      CT-CHEST (THORAX) W/O   Final Result      Large loculated right  hydropneumothorax with interval increase in size of the pneumothorax and pleural fluid.      Right chest tube is in the posterior right thorax.      There is mediastinal shift to the left compatible with tension.      Small pericardial effusion.      Small loculated left pleural effusion with overlying atelectasis/consolidation.   Pigtail catheter is seen at the medial left lung base. Pleural effusion has decreased in size compared to prior.      There are multiple foci of air extending from the right lung to the pleural space suspicious for a bronchopleural fistula.      Multiple cavitary lesions bilaterally with mild interval decrease of the solid component of the cavitary nodules.      Noncavitary 3.1 cm masslike density is seen at the left lung base.      Findings may be related to septic emboli, malignancy or multifocal abscesses. Short interval follow-up recommended.      Soft tissue air on the right is again seen.      Splenomegaly      Findings discussed with Leti Sun.      DX-CHEST-PORTABLE (1 VIEW)   Final Result      No significant change from prior exam.      CT-HEAD W/O   Final Result      1.  RIGHT frontal ventriculostomy catheter unchanged in position.   2.  Mild cortical atrophy.   3.  No intracranial hemorrhage.   4.  Apparent dural thickening overlying the clivus.  Consider further evaluation with contrast-enhanced MRI.      DX-CHEST-LIMITED (1 VIEW)   Final Result      Interval left basilar pigtail catheter with diminished atelectasis, pleural fluid      Stable right hydropneumothorax with thoracostomy tube in place, considerable soft tissue gas and partial lung collapse      IR-CHEST TUBE-EMPYEMA LEFT   Final Result      1.  CT GUIDED LEFT  10 Syriac PIGTAIL CHEST TUBE PLACEMENT FOR POSSIBLE EMPYEMA YIELDING OLD BLOODY FLUID.      2. A CHEST RADIOGRAPH IS FORTHCOMING.      EC-MICAH W/O CONT   Final Result      US-ABDOMEN LTD (SOFT TISSUE)   Final Result      No fluid seen surrounding the  electronic implanted spinal stimulator device.      DX-CHEST-LIMITED (1 VIEW)   Final Result      1.  Large right hydropneumothorax with right-sided chest tube in place, likely stable to slightly decreased from prior study.   2.  Small left pleural effusion. Mild improved aeration in the left lung.   3.  Bilateral pulmonary opacities which could be related to combination of atelectasis, edema and/or infection..      CT-CTA HEAD WITH & W/O-POST PROCESS   Final Result      1.  Patent carotid and vertebral arteries.      2.  Again seen right frontal the jugular peritoneal shunt catheter. There is mild increase in volume of the lateral and third ventricles.      CT-CHEST (THORAX) W/O   Final Result      1.  Interval placement of a right-sided chest tube into a large loculated right-sided pleural effusion. There is now seen a large right-sided hydropneumothorax in the area that previously seen fluid. The chest tube extends into that hydropneumothorax.    There is some residual pleural fluid seen as well. A hydropneumothorax is somewhat loculated with components most prominently inferiorly and medially.      2.  New loculated left pleural effusion.      3.  Again seen multiple bilateral cavitary pulmonary masses which are more prominent than previously seen with increasing cavitation and measure up to 3 cm size. Differential diagnosis includes septic emboli, multifocal abscesses and neoplasm.      4.  Large amount of subcutaneous emphysema on the right.      5.  Splenomegaly.      6.  Multiple support devices.      Fleischner Society pulmonary nodule recommendations:         DX-CHEST-LIMITED (1 VIEW)   Final Result      1.  Support devices as described above.      2.  Right chest tube present with a again seen right-sided pneumothorax, possibly increased in size and loculated.      3.  Worsening bilateral pulmonary infiltrates.      DX-ABDOMEN FOR TUBE PLACEMENT   Final Result      Cortrak feeding tube tip projects in the  region of the duodenal bulb.      DX-CHEST-LIMITED (1 VIEW)   Final Result      Loculated right pneumothorax is decreased in size compared to prior.      Small left pleural effusion.      Small loculated right pleural effusion.      Extensive bilateral airspace opacities are not significantly changed.      Soft tissue air on the right is again noted.      DX-CHEST-LIMITED (1 VIEW)   Final Result      Decreased partially loculated right pleural fluid with increased pleural air. Right chest tube is in place.      Increased hazy opacity in the left lung possibly atelectasis.         US-ABDOMEN LTD (SOFT TISSUE)   Final Result      No drainable fluid collection.      DX-CHEST-PORTABLE (1 VIEW)   Final Result      Decreased partially loculated right pleural fluid with increased pleural air. Right chest tube is in place.      No other significant change.      DX-CHEST-PORTABLE (1 VIEW)   Final Result         1.  Pulmonary edema and/or infiltrates are identified, which are somewhat decreased since the prior exam.   2.  Moderate loculated appearing right pleural effusion, slightly decreased since prior      DX-ABDOMEN FOR TUBE PLACEMENT   Final Result      Feeding tube tip at the distal stomach.      DX-CHEST-PORTABLE (1 VIEW)   Final Result         1.  New chest tube is noted in the right lower hemithorax.      2.  Right pleural effusion is again identified which appears similar to the prior exam.      3.  No new infiltrates or consolidations are identified.      DX-CHEST-PORTABLE (1 VIEW)   Final Result         1.  Pulmonary edema and/or infiltrates are identified, which are stable since the prior exam.   2.  Moderate loculated appearing right pleural effusion      DX-CHEST-PORTABLE (1 VIEW)   Final Result         No significant interval change.      POCT BUTTERFLY-ECHOCARDIOGRAM LTD W/O CONT    (Results Pending)        Assessment/Plan  * Bacteremia due to methicillin susceptible Staphylococcus aureus (MSSA)- (present on  admission)  Assessment & Plan  Infectious disease assisting with antibiotic treatment  Multiple potential sources  Endocarditis  Need to achieve source control    Debility  Assessment & Plan  Acute on chronic, the patient likely need of LTAC treatment post acute treatment    Elevated alkaline phosphatase level- (present on admission)  Assessment & Plan  The patient not able to tolerate a MRCP at this time, ultrasound grossly unremarkable, observe    Agitation requiring sedation protocol- (present on admission)  Assessment & Plan  Monitor,    Goals of care, counseling/discussion  Assessment & Plan  Patient with overall high risk of morbidity and mortality given her gravely ill state and prior chronic medical conditions  Palliative care assisting    Spinal cord stimulator status  Assessment & Plan  Patient's stimulator is [employer]. It is FDA approved as MRI compatible, but the company has gone out of business, so there is no support team to assist with MRI.  Thus, if MRI were performed, our radiologists would need to protocol it correctly to manage the stimulator. The former rep from [email or web address] is Andre, [phone].  Information obtained from Dr. Mahoney's office, [phone].     Per  (5/4/2021), it is not MRI compatible unfortunately.     Anasarca  Assessment & Plan  Improved nutritional status, skin protective measures    Trapped lung  Assessment & Plan  With need for further thoracotomy, decortication    Pulmonary abscess (HCC)  Assessment & Plan  From septic emboli, antibiotic treatment, CT follow-up    Abnormal movement  Assessment & Plan  Apparently tardive dyskinesia, avoid offending agents    Fever  Assessment & Plan  Resolved, monitor, antibiotic therapy    Atelectasis  Assessment & Plan  Pulmonary recruiting, respiratory care    Pneumonia  Assessment & Plan  MSSA, ongoing treatment    History of vasculitis- (present on admission)  Assessment & Plan  Does not appear to be a current issue    CSF  pleocytosis- (present on admission)  Assessment & Plan  Infectious disease managing    Empyema of right pleural space (HCC)- (present on admission)  Assessment & Plan  Patient s/p multiple chest tubes, thoracotomy and decortication  With recurrence unfortunately  Will need additional thoracic surgery with Dr. Ganser on 5/17    Acute bacterial endocarditis- (present on admission)  Assessment & Plan  Mitral valve, tricuspid valve, ongoing antibiotic therapy, source control    Acute blood loss anemia  Assessment & Plan  Likely secondary to hemothorax  300 cc of blood removed after chest tube placed at [facility]  Trend hemoglobin    Prolonged Q-T interval on ECG- (present on admission)  Assessment & Plan  Avoid medication affecting QTC, monitor    Acute respiratory failure with hypoxia (HCC)- (present on admission)  Assessment & Plan  Secondary to MSSA bacteremia, empyema, pulmonary septic emboli  S/p tracheostomy  Ongoing respiratory care, pulmonary toilet, antibiotic treatment  Prolonged ventilator dependence    History of complicated migraines- (present on admission)  Assessment & Plan  Monitor    GERD (gastroesophageal reflux disease)- (present on admission)  Assessment & Plan  History of, as needed treatment    Hyponatremia  Assessment & Plan  Monitor    Tachycardia  Assessment & Plan  Persistent, likely secondary with multiple underlying ongoing conditions  Monitor    Pseudotumor cerebri  Assessment & Plan  History of  shunt, neurosurgery opted against removal    H/O: CVA (cerebrovascular accident)- (present on admission)  Assessment & Plan  Monitor    Chronic pain syndrome- (present on admission)  Assessment & Plan  Pre-existing, pain management    Thrombocytopenia (HCC)- (present on admission)  Assessment & Plan  Transient, resolved    Anemia  Assessment & Plan  Monitor, transfuse as indicated    Septic shock (HCC)- (present on admission)  Assessment & Plan  Multiple sources, recovered with aggressive  treatment, IV antibiotics long-term    Sepsis (HCC)  Assessment & Plan  Multiple sources possible, recovered with medical treatment    Hypomagnesemia  Assessment & Plan  Monitor, replace and recheck    Hypokalemia  Assessment & Plan  Monitor, replace and recheck    Anxiety- (present on admission)  Assessment & Plan  Likely acute on chronic, monitor    S/P  shunt- (present on admission)  Assessment & Plan  History of, Dr. Oh felt there is currently no need to remove    Acute encephalopathy- (present on admission)  Assessment & Plan  Per neurosurgery no need to remove  shunt  Improving with ongoing medical treatment    Plan  Continue antibiotic therapy with the help of infectious disease  Continue prior psychiatric medication regimen  Pain control, multimodality  Will reduce scheduled narcotic medication  Attempt some anxiety control with BuSpar  Continue with rehab efforts  Await additional surgery on Monday  See orders  Medically complex high risk patient  Overall appears prognostically poor given her widespread infection, debility, lack of infection source control at this time    VTE prophylaxis: Lovenox    I have performed a physical exam and reviewed and updated ROS and Plan today . In review of yesterday's note , there are no changes except as documented above.        Please note that this dictation was created using voice recognition software. I have made every reasonable attempt to correct obvious errors, but I expect that there are errors of grammar and possibly context that I did not discover before finalizing the note.

## 2021-05-15 NOTE — PROGRESS NOTES
Dr. Armstrong paged after Chest Tube #2 became dislodged.    Ordered Stat Chest X-Ray. Ronny FIGUEROA will notify Nathaniel of results. Will continue to monitor. Pt hemodynamically stable at this time, O2 saturation 95%.      Quin Lindquist RN

## 2021-05-15 NOTE — CARE PLAN
Problem: Oxygenation:  Goal: Maintain adequate oxygenation dependent on patient condition  Outcome: Progressing

## 2021-05-15 NOTE — CARE PLAN
Problem: Knowledge Deficit - Standard  Goal: Patient and family/care givers will demonstrate understanding of plan of care, disease process/condition, diagnostic tests and medications  Outcome: Not Progressing   The patient is Stable - Low risk of patient condition declining or worsening         Progress made toward(s) clinical / shift goals:  control pain and anxiety    Patient is not progressing towards the following goals:      Problem: Knowledge Deficit - Standard  Goal: Patient and family/care givers will demonstrate understanding of plan of care, disease process/condition, diagnostic tests and medications  Outcome: Not Progressing

## 2021-05-15 NOTE — CARE PLAN
Problem: Pain Management  Goal: Pain level will decrease to patient's comfort goal  Outcome: Progressing     Problem: Knowledge Deficit - Standard  Goal: Patient and family/care givers will demonstrate understanding of plan of care, disease process/condition, diagnostic tests and medications  Outcome: Progressing     Problem: Fall Risk  Goal: Patient will remain free from falls  Outcome: Progressing   The patient is Watcher - Medium risk of patient condition declining or worsening

## 2021-05-16 LAB
ANION GAP SERPL CALC-SCNC: 8 MMOL/L (ref 7–16)
BUN SERPL-MCNC: 21 MG/DL (ref 8–22)
CALCIUM SERPL-MCNC: 9.6 MG/DL (ref 8.5–10.5)
CHLORIDE SERPL-SCNC: 103 MMOL/L (ref 96–112)
CO2 SERPL-SCNC: 28 MMOL/L (ref 20–33)
CREAT SERPL-MCNC: 0.22 MG/DL (ref 0.5–1.4)
GLUCOSE SERPL-MCNC: 110 MG/DL (ref 65–99)
POTASSIUM SERPL-SCNC: 4 MMOL/L (ref 3.6–5.5)
SODIUM SERPL-SCNC: 139 MMOL/L (ref 135–145)

## 2021-05-16 PROCEDURE — A9270 NON-COVERED ITEM OR SERVICE: HCPCS | Performed by: INTERNAL MEDICINE

## 2021-05-16 PROCEDURE — 94640 AIRWAY INHALATION TREATMENT: CPT

## 2021-05-16 PROCEDURE — 99232 SBSQ HOSP IP/OBS MODERATE 35: CPT | Performed by: HOSPITALIST

## 2021-05-16 PROCEDURE — 700102 HCHG RX REV CODE 250 W/ 637 OVERRIDE(OP): Performed by: INTERNAL MEDICINE

## 2021-05-16 PROCEDURE — 700105 HCHG RX REV CODE 258: Performed by: INTERNAL MEDICINE

## 2021-05-16 PROCEDURE — 94760 N-INVAS EAR/PLS OXIMETRY 1: CPT

## 2021-05-16 PROCEDURE — 700111 HCHG RX REV CODE 636 W/ 250 OVERRIDE (IP): Performed by: INTERNAL MEDICINE

## 2021-05-16 PROCEDURE — 700102 HCHG RX REV CODE 250 W/ 637 OVERRIDE(OP): Performed by: HOSPITALIST

## 2021-05-16 PROCEDURE — 770001 HCHG ROOM/CARE - MED/SURG/GYN PRIV*

## 2021-05-16 PROCEDURE — A9270 NON-COVERED ITEM OR SERVICE: HCPCS | Performed by: HOSPITALIST

## 2021-05-16 PROCEDURE — 80048 BASIC METABOLIC PNL TOTAL CA: CPT

## 2021-05-16 RX ORDER — DIAZEPAM 5 MG/1
5 TABLET ORAL
Status: DISCONTINUED | OUTPATIENT
Start: 2021-05-16 | End: 2021-05-28

## 2021-05-16 RX ADMIN — METHADONE HYDROCHLORIDE 10 MG: 10 CONCENTRATE ORAL at 22:06

## 2021-05-16 RX ADMIN — GABAPENTIN 300 MG: 300 CAPSULE ORAL at 21:46

## 2021-05-16 RX ADMIN — CEFEPIME 2 G: 2 INJECTION, POWDER, FOR SOLUTION INTRAVENOUS at 18:07

## 2021-05-16 RX ADMIN — CEFEPIME 2 G: 2 INJECTION, POWDER, FOR SOLUTION INTRAVENOUS at 09:38

## 2021-05-16 RX ADMIN — ACETAMINOPHEN 500 MG: 325 TABLET, FILM COATED ORAL at 09:39

## 2021-05-16 RX ADMIN — CLONIDINE HYDROCHLORIDE 0.1 MG: 0.1 TABLET ORAL at 05:11

## 2021-05-16 RX ADMIN — RISPERIDONE 2 MG: 1 TABLET, FILM COATED ORAL at 05:11

## 2021-05-16 RX ADMIN — OXYCODONE HYDROCHLORIDE 10 MG: 10 TABLET ORAL at 05:10

## 2021-05-16 RX ADMIN — GABAPENTIN 300 MG: 300 CAPSULE ORAL at 14:31

## 2021-05-16 RX ADMIN — BUSPIRONE HYDROCHLORIDE 10 MG: 10 TABLET ORAL at 18:06

## 2021-05-16 RX ADMIN — GABAPENTIN 300 MG: 300 CAPSULE ORAL at 05:09

## 2021-05-16 RX ADMIN — BUSPIRONE HYDROCHLORIDE 10 MG: 10 TABLET ORAL at 11:58

## 2021-05-16 RX ADMIN — ACETAMINOPHEN 500 MG: 325 TABLET, FILM COATED ORAL at 14:31

## 2021-05-16 RX ADMIN — METHADONE HYDROCHLORIDE 10 MG: 10 CONCENTRATE ORAL at 05:21

## 2021-05-16 RX ADMIN — OXYCODONE HYDROCHLORIDE 10 MG: 10 TABLET ORAL at 18:07

## 2021-05-16 RX ADMIN — DIAZEPAM 5 MG: 5 TABLET ORAL at 22:06

## 2021-05-16 RX ADMIN — METHADONE HYDROCHLORIDE 10 MG: 10 CONCENTRATE ORAL at 14:31

## 2021-05-16 RX ADMIN — METOPROLOL TARTRATE 25 MG: 25 TABLET, FILM COATED ORAL at 05:09

## 2021-05-16 RX ADMIN — CLONIDINE HYDROCHLORIDE 0.1 MG: 0.1 TABLET ORAL at 21:46

## 2021-05-16 RX ADMIN — CLONIDINE HYDROCHLORIDE 0.1 MG: 0.1 TABLET ORAL at 14:31

## 2021-05-16 RX ADMIN — ENOXAPARIN SODIUM 40 MG: 40 INJECTION SUBCUTANEOUS at 05:09

## 2021-05-16 RX ADMIN — CEFEPIME 2 G: 2 INJECTION, POWDER, FOR SOLUTION INTRAVENOUS at 01:16

## 2021-05-16 RX ADMIN — PAROXETINE HYDROCHLORIDE 10 MG: 20 TABLET, FILM COATED ORAL at 05:11

## 2021-05-16 RX ADMIN — DIVALPROEX SODIUM 500 MG: 125 CAPSULE ORAL at 05:10

## 2021-05-16 RX ADMIN — DIVALPROEX SODIUM 500 MG: 125 CAPSULE ORAL at 14:31

## 2021-05-16 RX ADMIN — DULOXETINE HYDROCHLORIDE 60 MG: 60 CAPSULE, DELAYED RELEASE ORAL at 05:11

## 2021-05-16 RX ADMIN — BUSPIRONE HYDROCHLORIDE 10 MG: 10 TABLET ORAL at 05:09

## 2021-05-16 RX ADMIN — ACETAMINOPHEN 500 MG: 325 TABLET, FILM COATED ORAL at 21:46

## 2021-05-16 RX ADMIN — METOPROLOL TARTRATE 25 MG: 25 TABLET, FILM COATED ORAL at 18:06

## 2021-05-16 RX ADMIN — DIVALPROEX SODIUM 500 MG: 125 CAPSULE ORAL at 21:46

## 2021-05-16 RX ADMIN — RISPERIDONE 2 MG: 1 TABLET, FILM COATED ORAL at 18:06

## 2021-05-16 RX ADMIN — OXYCODONE HYDROCHLORIDE 10 MG: 10 TABLET ORAL at 11:58

## 2021-05-16 RX ADMIN — DULOXETINE HYDROCHLORIDE 60 MG: 60 CAPSULE, DELAYED RELEASE ORAL at 18:06

## 2021-05-16 ASSESSMENT — PAIN DESCRIPTION - PAIN TYPE
TYPE: CHRONIC PAIN

## 2021-05-16 ASSESSMENT — ENCOUNTER SYMPTOMS
FEVER: 0
ABDOMINAL PAIN: 0

## 2021-05-16 NOTE — PROGRESS NOTES
Infectious Disease Progress Note    Author: Dixie Albarran M.D. Date & Time created: 5/16/2021  12:37 PM  Cefepime 4/23 -  current Day #21     Thoracoscopy & Decortication - 4/28     PRIOR Rx:   Zosyn 4/22-4/23  Previous: Unasyn, Zosyn, Vanco, Daptomycin  Ceftaroline: start 4/13-4/15  Nafcillin 2g Q4 hrs 4/15-4/23 4/14: Unable to give name of previous NSG or neurologist. Seems confused, but able to say some sentences fluently.   4/15: Line cultures now growing MSSA. As well as from the blood. Repeat blood culture 4/13+ in both sets. Patient has worsening confusion. CSF fluid analyses suggest pleocytosis. Cultures are no growth from the CSF. Chest CT was ordered this morning indicating a loculated right pleural effusion as well as bilateral septic emboli. Dr. Mack spoke with Dr. Oh yesterday and no surgical intervention unless clinical deterioration.  4/16: Increased respiratory distress.  Tachypneic.  CT with loculated collection.  Dr Resendiz not available.  No thoracic surgeon available.  Plans to have pulmonary place CT.  Transferring to ICU.  4/17: Transferred to [facility] last night. Hgb dropped to 6.4 requiring PRBC transfusion. Requiring 2 pressors. Fio2 50%. Febrile 38.8. Pending OR decortication of empyema and hemothorax. Chest tube placed at [facility] shows 8,371 WBC, ,000, 74% N  4/18: Chest tube was upsized by surgery yesterday, no decortication. WBC 22k. Fio2 40%. Vasopressin. Levophed down to 2mcg. Afebrile 24 hrs. Last fever 38.6 on 4/16.   4/19: Still on vent. Levo 3 mcg, vasopressin. Afebrile 72 hours. WBC 21k. BC 4/17 NGTD x 48 hours. Unable to do MRI brain given neurostimulator per RN.   4/20: Remaining afebrile. Hb 6.2 and undergoing transfusion today.WBC 24,100, 5% bands.1700 ml CT output. Copious bloody ET secretions. No pressors. Receiving dornase and TPA per CT. Right pleural effusion not large per CXR today.   4/21: WBC 31k. Bronchoscopy yesterday showing blood. Cultures sent. TPA on  hold per RN due to arvind blood from chest tube requiring PRBC transfusion for hgb 6. Pending chest CT today. Per , spinal stimulator is non-MRI compatible. Levophed 10mcg. 30% Fio2. Afebrile.   4/22: Fever 101.3 this am. WBC 20,300 down from 32,200 yesterday. BAL growing Klebsiella pneumoniae but susceptibility panel not set up until today. CTA of brain shows no lesion. CT of chest shows enlarging cavitary lung lesions bilat and large loculated new left pleural effusion and large hydropneumothorax on right. TPA & dornase infusions of right chest ended 4/20 after considerable bleeding requiring transfusion. Hb now down again to 6.8.  4/23: WBC 23k. Fio2 40%. Tmax 38.1. US of stiumlator shows small fluid collection but no drainable abscess. Pending MICAH today. Pending L chest tube placement  4/24: Continue fever 100.8-101.8. WBC 16,600. MICAH shows large vegetations on both tricuspid valve and mitral valve with mild TR & MR.  No aortic root abscess. Left chest tube placed yesterday yielding 8 ml of old bloody fluid. Bronch today revealed copious thick maroon secretions in distal trachea and both mainstem bronchi. On the right these were obstructive. Remaining off pressors. Last positive blood cultures (MSSA) were 4/16, negative 4/17.  4/25: Fever 100.6 this AM. wBC 13,300. Hb 6.6. CT: right hydropneumothorax increasing, right bronchopleural fistula, mediastinal shift ot the left , multiple cavitary pulmonary nodules, 3.1 cm left basilar mass, splenomegaly. CT of head shows dural thickening over the clivus. Lac+ GNR >100,000 col/ml growing from BAL of 4/24, presumed Klebsiella. Left peural fluid culture negative from 4/23.  4/26: Fever 100.4 last night. WBC 13,500. Hb 7.4. Plts 502,000. ESR >120. UA fairly clear except 30 mg% protein, 2-5 wbc and rare rbc. CXR unchanged except more prominent pulmonary edema. GNR in BAL may be a different species of Klebsiella, and resistant to ampicillin & tmp-sulfa; confirmation  pending.  4/28: Fever 100.8 last night. WBC 27,700. Hb 6.6. Plts 482,000. Creat 0.19. Kleb pneum in BAL on 4/24 is resistant to ampicillin & tmp-sulfa but otherwise sensitive. O2 sat 96% on FIO2 30% now, PEEP 8. Has been re-evaluated by thoracic surgeon, Dr. Ganser, who believes she will not wean without decortication on the right. Surgery anticipated today.  4/29: S/P right thoracoscopy with decortication with cultures NG at 24 hrs.  4/30: Had a bronch due to hypoxia and hypotension. CXR with worsening right hemithorax pulmonary opacities. Bloody mucous plugs removed. Pressors started. Febrile this am. Tachy in 130s. Pressors just removed. Tolerating vent, but not a SBT candidate at the moment.  5/1: Small air leak CT-2 every ~ 2/3 breathes. The K. pneumoniae from her thoracoscopy is sensitive to her cefepime so no antibiotic change needed.   5/2: No air leak today she tolerated 2  hrs of spontaneous breathing with support today.      5/3: Second day with SBT and doing well now for 2 hrs. Slight bump in temperature and      WBC's but no obvious clinical infectious changed so watch closely.      5/4: Fever still from time to time. WBCs trending up. Chest tubes in place. Trach.       5/5: She clearly is better today she was sitting up in bed with open eyes and follows commands. She still has low grade fever but her WBC's are dropping. Cefepime dose increased yesterday for increased CNS coverage if necessary. She will probably need further thoracic surgery as mentioned by Dr. Ganser. The issue of what to do with her stimulator is still up in the air for now as it probably will need to be removed but she is nort a candidate for more major surgery at this time.       5/7: She continues to improve and under go longer SBT trials. She just had a new PICC placed in her right arm and plan is to D/C central line.        5/8: PICC placed. No fevers. Comfortable. Cortrak placed. Failing SBTs.  5/9: No acute issues. No fevers.  Comfortable. Family at bedside.   5/10: No acute issues, fevers or WBC bumps. No changes in condition. She is to get her CT scan today and be reviewed by surgery  5/11:  at bedside.  Has been referred to [facility].  Repeat CT scan completed.  Results noted.  ? If Dr Ganser has seen.  Still with an empyema:  ? If candidate for further surgery.  5/12: Pending next thoracic surgery. No fever. Anxious.  5/13: ? Surgery date.  No one seems to know.  Had speaking valve in and having swallow eval with speech therapy  5/14: No acute issues. No fevers. Surgery Monday.  5/15: One CT accidentally pulled out yesterday.  Has been on T piece and tolerating. Plans for surgery Monday afternoon.  5/16: On schedule for surgery 5/17: 4:30 pm.  Moved to room T614.  No new issues.  Interval History:  Past 24 hrs reviewed with RN.    Labs Reviewed, Medications Reviewed, Radiology Reviewed, Wound Reviewed, Fluids Reviewed and GI Nutrition Reviewed    Review of Systems:  Review of Systems   Constitutional: Negative for fever.   Cardiovascular: Negative for chest pain.   Gastrointestinal: Negative for abdominal pain.       Physical Exam:  Physical Exam  Constitutional:       Appearance: Normal appearance.   HENT:      Nose:      Comments: NG  Neck:      Comments: ET  Cardiovascular:      Rate and Rhythm: Normal rate and regular rhythm.   Pulmonary:      Breath sounds: Rhonchi present.      Comments: CT x 1.  Still with bloody drainage in tubing..  Abdominal:      General: Abdomen is flat.   Skin:     General: Skin is warm and dry.   Neurological:      General: No focal deficit present.      Mental Status: She is alert.         Labs:  Recent Results (from the past 24 hour(s))   Basic Metabolic Panel    Collection Time: 05/16/21  3:30 AM   Result Value Ref Range    Sodium 139 135 - 145 mmol/L    Potassium 4.0 3.6 - 5.5 mmol/L    Chloride 103 96 - 112 mmol/L    Co2 28 20 - 33 mmol/L    Glucose 110 (H) 65 - 99 mg/dL    Bun 21 8 - 22 mg/dL     "Creatinine 0.22 (L) 0.50 - 1.40 mg/dL    Calcium 9.6 8.5 - 10.5 mg/dL    Anion Gap 8.0 7.0 - 16.0   ESTIMATED GFR    Collection Time: 21  3:30 AM   Result Value Ref Range    GFR If African American >60 >60 mL/min/1.73 m 2    GFR If Non African American >60 >60 mL/min/1.73 m 2     Results     Procedure Component Value Units Date/Time    BLOOD CULTURE [[id number]] Collected: 21 1200    Order Status: Completed Specimen: Blood from Peripheral Updated: 21 1300     Significant Indicator NEG     Source BLD     Site PERIPHERAL     Culture Result No growth after 5 days of incubation.    Narrative:      Collected By:[id number] FERNANDA WISE  Per Hospital Policy: Only change Specimen Src: to \"Line\" if  specified by physician order.  Left AC    BLOOD CULTURE [[id number]] Collected: 21 1150    Order Status: Completed Specimen: Blood from Peripheral Updated: 21 1300     Significant Indicator NEG     Source BLD     Site PERIPHERAL     Culture Result No growth after 5 days of incubation.    Narrative:      Collected By:[id number] FERNANDA WISE  Per Hospital Policy: Only change Specimen Src: to \"Line\" if  specified by physician order.  Right Hand        Hemodynamics:  Temp (24hrs), Av.2 °C (97.1 °F), Min:35.9 °C (96.7 °F), Max:36.3 °C (97.3 °F)  Temperature: 36.3 °C (97.3 °F)  Pulse  Av.2  Min: 40  Max: 149   Blood Pressure: 133/82    PICC Double Lumen 21 Lumen 1: Purple Lumen 2: Red Right Brachial (Active)   Site Assessment Clean;Dry;Intact 21 08   Lumen 1 Status Blood return noted;Flushed;Normal saline locked;Scrubbed the hub prior to access 21 08   Lumen 2 Status Blood return noted;Flushed;Normal saline locked;Scrubbed the hub prior to access 21 08   Line Secured at (cm) 1 cm 21 1118   Extremity Circumference (cm) 30.5 cm 21 1118   Dressing Type Biopatch;Securing device;Skin barrier;Transparent 21 0800   Dressing Status Clean;Dry;Intact " 05/16/21 0800   Dressing Intervention Dressing changed per protocol 05/15/21 1800   Dressing Change Due 05/22/21 05/16/21 0800   Date Primary Tubing Changed 05/11/21 05/11/21 0800   Date Secondary Tubing Changed 05/11/21 05/11/21 0800   Date IV Connector(s) Changed 05/15/21 05/11/21 0800   NEXT IV Connector(s) Change 05/08/21 05/07/21 1118   Line Necessity Assessed Antibiotic Therapy Greater than 7 Days 05/15/21 0900   $ Double Lumen PICC Charge Single kit used 05/07/21 1118     Wound:  @WOUNDLDA(4)@     Fluids:  Intake/Output                             05/14/21 0700 - 05/15/21 0659 05/15/21 0700 - 05/16/21 0659 05/16/21 0700 - 05/17/21 0659     2046-5177 1014-7241 Total 5427-4303 1136-3459 Total 0479-5316 4511-5345 Total                    Intake    Other  --  -- --  90  60 150  60  -- 60    Medications (PO/Enteral Liquids) -- -- -- 90 60 150 60 -- 60    NG/GT  540  450 990  315  495 810  270  -- 270    Intake (mL) (Enteral Tube 05/11/21 Cortrak - Gastric) 540 450 990 315 495 810 270 -- 270    Total Intake 540 450 990 405 555 960 330 -- 330       Output    Urine  200  700 900  --  600 600  --  -- --    Number of Times Voided 1 x -- 1 x 2 x -- 2 x -- -- --    Urine Void (mL) 200 100 300 -- -- -- -- -- --    Output (mL) (External Urinary Catheter (Female Wick)) -- 600 600 -- 600 600 -- -- --    Stool  --  -- --  --  -- --  --  -- --    Number of Times Stooled -- -- -- 1 x -- 1 x -- -- --    Chest Tube  15  0 15  0  20 20  --  -- --    Output (mL) ([REMOVED] Chest Tube 2 Right Midaxillary) 15 -- 15 -- -- -- -- -- --    Output (mL) (Chest Tube 1 Right Midaxillary;Pleural) 0 0 0 0 20 20 -- -- --    Total Output 215 700 915 0 620 620 -- -- --       Net I/O     325 -250 75 405 -65 340 330 -- 330           GI/Nutrition:  Orders Placed This Encounter   Procedures   • Diet NPO     Standing Status:   Standing     Number of Occurrences:   1     Order Specific Question:   Restrict to:     Answer:   Strict [1]   • Diet NPO      Standing Status:   Standing     Number of Occurrences:   8     Order Specific Question:   Restrict to:     Answer:   Strict [1]     Medications:  Current Facility-Administered Medications   Medication Last Admin   • diazePAM (VALIUM) tablet 5 mg     • busPIRone (BUSPAR) tablet 10 mg 10 mg at 05/16/21 1158   • oxyCODONE immediate release (ROXICODONE) tablet 10 mg 10 mg at 05/16/21 1158   • metoprolol tartrate (LOPRESSOR) tablet 25 mg 25 mg at 05/16/21 0509   • ondansetron (ZOFRAN) syringe/vial injection 4 mg 4 mg at 05/13/21 0431   • acetaminophen (TYLENOL) tablet 500 mg 500 mg at 05/16/21 0939   • oxyCODONE immediate-release (ROXICODONE) tablet 5 mg     • methadone (DOLOPHINE) 10 MG/ML solution 10 mg 10 mg at 05/16/21 0521   • gabapentin (NEURONTIN) capsule 300 mg 300 mg at 05/16/21 0509   • cloNIDine (CATAPRES) tablet 0.1 mg 0.1 mg at 05/16/21 0511   • traMADol (ULTRAM) 50 MG tablet 50 mg 50 mg at 05/15/21 0410   • cefepime (MAXIPIME) 2 g in  mL IVPB Stopped at 05/16/21 1008   • labetalol (NORMODYNE/TRANDATE) injection 10 mg 10 mg at 05/04/21 0226   • [Held by provider] enoxaparin (LOVENOX) inj 40 mg 40 mg at 05/16/21 0509   • Pharmacy Consult: Enteral tube insertion - review meds/change route/product selection     • acetaminophen (Tylenol) tablet 650 mg 650 mg at 05/12/21 1233   • magnesium hydroxide (MILK OF MAGNESIA) suspension 30 mL      And   • senna-docusate (PERICOLACE or SENOKOT S) 8.6-50 MG per tablet 2 tablet 2 tablet at 05/15/21 0605    And   • polyethylene glycol/lytes (MIRALAX) PACKET 1 Packet 1 Packet at 05/11/21 0525    And   • bisacodyl (DULCOLAX) suppository 10 mg     • divalproex (DEPAKOTE SPRINKLE) capsule 500 mg 500 mg at 05/16/21 0510   • DULoxetine (CYMBALTA) capsule 60 mg 60 mg at 05/16/21 0511   • PARoxetine (PAXIL) tablet 10 mg 10 mg at 05/16/21 0511   • risperiDONE (RISPERDAL) tablet 2 mg 2 mg at 05/16/21 0511   • Respiratory Therapy Consult     • MD Alert...ICU Electrolyte  Replacement per Pharmacy     • ipratropium-albuterol (DUONEB) nebulizer solution       Medical Decision Making, by Problem:  Active Hospital Problems    Diagnosis    • *Bacteremia due to methicillin susceptible Staphylococcus aureus (MSSA) [R78.81, B95.61]    • Agitation requiring sedation protocol [R45.1]    • 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]    • Acute respiratory failure with hypoxia (HCC) [J96.01]    • Acute bacterial endocarditis [I33.0]    • Empyema of right pleural space (HCC) [J86.9]    • Acute encephalopathy [G93.40]    • Fever [R50.9]    • Pleural effusion, left [J90]    • Atelectasis [J98.11]    • CSF pleocytosis [D72.9]    • Pneumonia [J18.9]    • Tachycardia [R00.0]    • Pseudotumor cerebri [G93.2]    • S/P  shunt [Z98.2]    • Prolonged Q-T interval on ECG [R94.31]    • History of vasculitis [Z86.79]    • History of complicated migraines [G43.109]    • Hyponatremia [E87.1]    • H/O: CVA (cerebrovascular accident) [Z86.73]    • Chronic pain syndrome [G89.4]    • Thrombocytopenia (HCC) [D69.6]    • Anxiety [F41.9]    • Spinal cord stimulator status [Z96.89]    • Goals of care, counseling/discussion [Z71.89]    • Abnormal movement [G25.9]    • Pulmonary abscess (HCC) [J85.2]    • Anasarca [R60.1]    • Thrombocytosis (HCC) [D47.3]    • GERD (gastroesophageal reflux disease) [K21.9]    • Septic shock (HCC) [A41.9, R65.21]    • Anemia [D64.9]    • Hypokalemia [E87.6]      Impression   -Severe sepsis  -Catheter related bloodstream infection due to infected port status post removal 4/12-staph aureus  -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       - Acute left empyema s/p chest tube placement 4/23  -Multiple acute septic pulmonary emboli bilaterally  -Acute bilateral pneumonia due to above     --Pulmonary edema  -Pseudotumor cerebri with underlying  shunt: possible arachnoiditis  -Chronic migraine  headaches  -History of autoimmune vasculitis  -Elevated alkaline phosphatase & bilirubin  -Acute encephalopathy due to sepsis      - anemia due to above      - New secondary Klebsiella pneumoniae pneumonia (presumed VAP)     Plan   - Continue cefepime - clinical endpoint - Empyema needs debridement.   - Monday: surgery   - Would appreciate new cultures tomorrow.  - Has had cefepime since 4/28 to cover Kleb - this should be treated by now   - Will continue through the surgery and if no new organisms assess if we can go back to MSSA coverage alone.  - Vent management per pulmonology - SBT trials  - Tolerated the decortication. Cx NGTD.   - No IV abx changes or further ID workup  - Continue to monitor closely  - Improving overall.   - Current abx covering CNS and back hardware.    - This HW is not coming out anytime soon per NS.    - Dispo:         - Pending tolerance of surgery.  Referrals have been made to [facility].  Will see how she does post surgery.    Case and treatment reviewed with patient(best possible) and RN    > 30 min on floor in ICU with > 50% face to face view and 100% in direct patient care/counseling today.

## 2021-05-16 NOTE — CARE PLAN
Problem: Pain Management  Goal: Pain level will decrease to patient's comfort goal  Outcome: Progressing     Problem: Knowledge Deficit - Standard  Goal: Patient and family/care givers will demonstrate understanding of plan of care, disease process/condition, diagnostic tests and medications  Outcome: Progressing     Problem: Fall Risk  Goal: Patient will remain free from falls  Outcome: Progressing   The patient is       Progress made toward(s) clinical / shift goals:     Patient is not progressing towards the following goals:

## 2021-05-16 NOTE — PROGRESS NOTES
Pharmacy Pharmacotherapy Consult for LOS >30 days    Admit Date: 4/16/2021      Medications were reviewed for appropriateness and ongoing need.     Current Facility-Administered Medications   Medication Dose Route Frequency Provider Last Rate Last Admin   • diazePAM (VALIUM) tablet 5 mg  5 mg Enteral Tube QHS PRN Raphael Armstrong M.D.       • busPIRone (BUSPAR) tablet 10 mg  10 mg Enteral Tube TID Raphael Armstrong M.D.   10 mg at 05/16/21 1158   • oxyCODONE immediate release (ROXICODONE) tablet 10 mg  10 mg Enteral Tube Q6HRS Raphael Armstrong M.D.   10 mg at 05/16/21 1158   • metoprolol tartrate (LOPRESSOR) tablet 25 mg  25 mg Enteral Tube TWICE DAILY Raphael Armstrong M.D.   25 mg at 05/16/21 0509   • ondansetron (ZOFRAN) syringe/vial injection 4 mg  4 mg Intravenous Q4HRS PRN Man Wahl M.D.   4 mg at 05/13/21 0431   • acetaminophen (TYLENOL) tablet 500 mg  500 mg Enteral Tube TID Raphael Armstrong M.D.   500 mg at 05/16/21 1431   • oxyCODONE immediate-release (ROXICODONE) tablet 5 mg  5 mg Enteral Tube Q4HRS PRN Raphael Armstrong M.D.       • methadone (DOLOPHINE) 10 MG/ML solution 10 mg  10 mg Enteral Tube Q8HRS Jeremy M Gonda, M.D.   10 mg at 05/16/21 1431   • gabapentin (NEURONTIN) capsule 300 mg  300 mg Enteral Tube Q8HRS Juventino Lozano M.D.   300 mg at 05/16/21 1431   • cloNIDine (CATAPRES) tablet 0.1 mg  0.1 mg Enteral Tube Q8HRS Juventino Lozano M.D.   0.1 mg at 05/16/21 1431   • traMADol (ULTRAM) 50 MG tablet 50 mg  50 mg Enteral Tube Q6HRS PRN Jonathan Upton D.OFrederick   50 mg at 05/15/21 0410   • cefepime (MAXIPIME) 2 g in  mL IVPB  2 g Intravenous Q8HRS Eric Lowery M.D.   Stopped at 05/16/21 1008   • labetalol (NORMODYNE/TRANDATE) injection 10 mg  10 mg Intravenous Q4HRS PRN Leti Sun M.D.   10 mg at 05/04/21 0226   • [Held by provider] enoxaparin (LOVENOX) inj 40 mg  40 mg Subcutaneous DAILY Benoit Loya Jr., D.O.   40 mg at 05/16/21 050   • Pharmacy Consult:  Enteral tube insertion - review meds/change route/product selection  1 Each Other PHARMACY TO DOSE Elliott Salvador M.D.       • acetaminophen (Tylenol) tablet 650 mg  650 mg Enteral Tube Q4HRS PRN Elliott Salvador M.D.   650 mg at 05/12/21 1233   • magnesium hydroxide (MILK OF MAGNESIA) suspension 30 mL  30 mL Enteral Tube QDAY PRN Elliott Salvador M.D.        And   • senna-docusate (PERICOLACE or SENOKOT S) 8.6-50 MG per tablet 2 tablet  2 tablet Enteral Tube BID Elliott Slavador M.D.   2 tablet at 05/15/21 0605    And   • polyethylene glycol/lytes (MIRALAX) PACKET 1 Packet  1 Packet Enteral Tube QDAY PRN Elliott Salvador M.D.   1 Packet at 05/11/21 0525    And   • bisacodyl (DULCOLAX) suppository 10 mg  10 mg Rectal QDAY PRN Elliott Salvador M.D.       • divalproex (DEPAKOTE SPRINKLE) capsule 500 mg  500 mg Enteral Tube Q8HRS Elliott Salvador M.D.   500 mg at 05/16/21 1431   • DULoxetine (CYMBALTA) capsule 60 mg  60 mg Enteral Tube BID Elliott Salvador M.D.   60 mg at 05/16/21 0511   • PARoxetine (PAXIL) tablet 10 mg  10 mg Enteral Tube QAM Elliott Salvador M.D.   10 mg at 05/16/21 0511   • risperiDONE (RISPERDAL) tablet 2 mg  2 mg Enteral Tube BID Elliott Salvador M.D.   2 mg at 05/16/21 0511   • Respiratory Therapy Consult   Nebulization Continuous RT Man Wahl M.D.       • ipratropium-albuterol (DUONEB) nebulizer solution  3 mL Nebulization Q2HRS PRN (RT) Man Wahl M.D.           Recommendations:  No recommendations at this time    John Tafoya, OllieD

## 2021-05-16 NOTE — PROGRESS NOTES
Hospital Medicine Daily Progress Note    Date of Service  5/16/2021    Chief Complaint  50 y.o. female admitted 4/16/2021 with empyema with MSSA, complicated by respiratory failure and thoracic surgery consultation, transferred from an outlying facility namely [facility]    Hospital Course  This is a 50-year-old female with a past medical history of pseudotumor cerebri s/p  shunt implantation by Dr. Oh neurosurgery, chronic pain syndrome with a history of placement of a nerve stimulator, vasculitis, right anterior chest port for home IV medication administration, recurrent infections related to port, presented on 4/11 to [facility] with recurrent port infection was initially treated with vancomycin and Zosyn and then changed to ceftaroline subsequently switched to nafcillin, MSSA identified.  Serum infectious disease is managing antibiotics for the patient.  Dr. Candelaria surgery removed the port on 4/12.  The patient was further identified to have endocarditis of the tricuspid valve, a lumbar puncture performed on 4/14 showed pleocytosis with negative Gram stain.  The patient suffered worsening leukocytosis and was found to have septic emboli per CT.  Initially a small pleural catheter was placed.  Significant loculations were encountered.  MSSA empyema was diagnosed.  Neurosurgery was consulted to comment on possibly removal of a  shunt, this was felt not appropriate at this time.  The patient remained on mechanical ventilation for 15 days, then a perc trach was placed.  The patient was eventually liberated from mechanical ventilation, the patient did have a right thoracoscopy and decortication of the lung performed on 4/28 by Dr. Ganser.  The patient was of identified to have multiple sources for MSSA including mitral valve, tricuspid valve vegetation, port, spinal stimulator,  shunt possibly.  The patient is slowly improving, she is significant chronic pain  issues, it was determined that her loculated pleural fluid is returning and the patient will need more extensive thoracotomy/pleurodesis on 5/17 with Dr. Ganser.  The patient was referred to LTAC pending additional surgical interventions to be completed    Interval Problem Update  Patient seen and examined today.    Patient tolerating treatment and therapies.  All Data, Medication data reviewed.  Case discussed with nursing as available.  Plan of Care reviewed with patient and notified of changes.  5/13 the patient appears significantly lethargic, possible medication effects, she is able to awaken and respond some, she is complaining of back pain, afebrile, heart rate in the 1 teens to 130s, respiration in the 15 range unlabored, the patient is on 7 L T-piece, 96%, blood pressure 108/64, improving anemia hemoglobin 9.8, chemistry fairly unremarkable except for alk phos of 741 which is improving,  5/14 the patient states that she has much anxiety, less so pain today, she was informed about the surgical plans for Monday, medication adjusted, still appears very lethargic and debilitated and weak  5/15 the patient continues to be lethargic, complains still of anxiety, restlessness and poor sleep, a chest tube was accidentally pulled out last evening, chest x-ray remained unchanged, and the area was secured with a occlusive dressing, this morning the patient removed/pulled her core track, reinserted, low potassium noted, ongoing lethargy, chronic illness and debility.  Awaiting additional surgery on Monday.  5/16 Enedelia continues to appear drowsy, some anxiety, mild tremor, no respiratory difficulty, appears adequately medicated for pain, awaiting surgery tomorrow, chemistry reviewed, medication regimen reviewed  Consultants/Specialty  Critical care/pulmonary  Infectious disease  Thoracic surgery  Palliative care    Code Status  Full Code    Disposition  Will likely postacute treatment at an LTAC level    Review of  Systems  Review of Systems   Unable to perform ROS: Mental acuity        Physical Exam  Temp:  [35.9 °C (96.7 °F)-36.3 °C (97.3 °F)] 36.3 °C (97.3 °F)  Pulse:  [] 94  Resp:  [16-22] 18  BP: (129-155)/(62-90) 133/82  SpO2:  [95 %-99 %] 95 %    Physical Exam  Vitals and nursing note reviewed.   Constitutional:       Appearance: She is well-developed and normal weight. She is ill-appearing. She is not diaphoretic.      Interventions: Cervical collar in place.      Comments: Lethargic, chronically ill-appearing female, looks much older than stated age   HENT:      Head: Normocephalic and atraumatic.      Nose: Nose normal.      Comments: Core track     Mouth/Throat:      Mouth: Mucous membranes are dry.   Eyes:      Conjunctiva/sclera: Conjunctivae normal.      Pupils: Pupils are equal, round, and reactive to light.   Neck:      Thyroid: No thyromegaly.      Vascular: No JVD.      Comments: Tracheostomy  Cardiovascular:      Rate and Rhythm: Normal rate and regular rhythm.      Heart sounds: Normal heart sounds. No friction rub. No gallop.    Pulmonary:      Effort: Pulmonary effort is normal.      Breath sounds: Rhonchi and rales present. No wheezing.      Comments: Chest tube in place with serosanguineous fluid  Abdominal:      General: Bowel sounds are normal. There is no distension.      Palpations: Abdomen is soft. There is no mass.      Tenderness: There is no abdominal tenderness. There is no guarding or rebound.   Musculoskeletal:         General: No tenderness. Normal range of motion.      Cervical back: Normal range of motion and neck supple.   Lymphadenopathy:      Cervical: No cervical adenopathy.   Skin:     General: Skin is warm and dry.   Neurological:      General: No focal deficit present.      Mental Status: She is lethargic and disoriented.      Cranial Nerves: No cranial nerve deficit.      Motor: Weakness present.         Fluids    Intake/Output Summary (Last 24 hours) at 5/16/2021 1233  Last  data filed at 5/16/2021 1200  Gross per 24 hour   Intake 1260 ml   Output 620 ml   Net 640 ml       Laboratory  Recent Labs     05/14/21  0118 05/15/21  0229   WBC 7.1 6.9   RBC 3.22* 2.99*   HEMOGLOBIN 9.1* 8.8*   HEMATOCRIT 31.3* 28.3*   MCV 97.2 94.6   MCH 28.3 29.4   MCHC 29.1* 31.1*   RDW 60.1* 57.2*   PLATELETCT 319 306   MPV 9.2 9.1     Recent Labs     05/14/21  0118 05/15/21  0229 05/16/21  0330   SODIUM 139 139 139   POTASSIUM 3.7 3.3* 4.0   CHLORIDE 104 105 103   CO2 27 27 28   GLUCOSE 104* 83 110*   BUN 17 14 21   CREATININE <0.17* <0.17* 0.22*   CALCIUM 8.6 8.3* 9.6                   Imaging  DX-ABDOMEN FOR TUBE PLACEMENT   Final Result      Feeding tube tip projects over the expected location the gastric antrum.      DX-CHEST-PORTABLE (1 VIEW)   Final Result         1.  Hazy right pulmonary infiltrates and/or effusion, similar compared to prior study.      DX-CHEST-LIMITED (1 VIEW)   Final Result      1.  No pneumothorax. Only one chest tube is now seen in the right lung.   2.  The chest is otherwise stable.      DX-CHEST-PORTABLE (1 VIEW)   Final Result         1.  Hazy right pulmonary infiltrates and/or effusion, similar compared to prior study.      DX-CHEST-PORTABLE (1 VIEW)   Final Result         1.  Hazy right pulmonary infiltrates and/or effusion, similar compared to prior study.      DX-ABDOMEN FOR TUBE PLACEMENT   Final Result         1.  Nonspecific bowel gas pattern.   2.  Dobbhoff tube tip terminates overlying the expected location of the gastric antrum.   3.  Hazy bilateral lung base infiltrates, greater on the right.      DX-CHEST-PORTABLE (1 VIEW)   Final Result         1.  Hazy right pulmonary infiltrates and/or effusion, similar compared to prior study.      US-RUQ   Final Result         1.  Hepatomegaly   2.  Mild intrahepatic and extrahepatic biliary ductal dilatation, commonly associated with postcholecystectomy physiology, consider causes of biliary obstruction with additional  workup as clinically appropriate      CT-CHEST,ABDOMEN,PELVIS WITH   Final Result      1.  Large empyema in the RIGHT hemithorax, slightly decreased in size from prior exam with chest tubes present.   2.  Consolidation remains.   3.  Multiple cavitary lesions again seen in the LEFT upper lobe, minimally decreased from prior, concerning for septic emboli.   4.  Supportive tubing is unchanged.   5.  Intrahepatic and extrahepatic biliary dilation, likely postoperative although distal stricture, stone or mass remain possibilities.   6.  Moderate pelvic fluid, nonspecific.   7.  No evidence of bowel obstruction or perforation.      DX-CHEST-PORTABLE (1 VIEW)   Final Result         1.  Hazy right pulmonary infiltrates and/or effusion, similar compared to prior study.      DX-CHEST-PORTABLE (1 VIEW)   Final Result         No significant interval change.            DX-CHEST-PORTABLE (1 VIEW)   Final Result         Interval removal of left central venous catheter. Interval adjustment of right PICC with tip in the SVC.         DX-ABDOMEN FOR TUBE PLACEMENT   Final Result      Feeding tube tip at the mid to distal stomach.      IR-PICC LINE PLACEMENT W/ GUIDANCE > AGE 5   Final Result                  Ultrasound-guided PICC placement performed by qualified nursing staff as    above.          DX-CHEST-PORTABLE (1 VIEW)   Final Result      1.  Stable cardiopulmonary findings.   2.  See comments above regarding the right-sided PICC position.      DX-CHEST-PORTABLE (1 VIEW)   Final Result      Stable chest findings compared to 5/5.      DX-CHEST-PORTABLE (1 VIEW)   Final Result      Stable chest findings compared with 5/3.      DX-ABDOMEN FOR TUBE PLACEMENT   Final Result         Feeding tube with tip projecting over the expected area of the stomach.      DX-CHEST-PORTABLE (1 VIEW)   Final Result      Stable chest findings compared with prior.      DX-CHEST-PORTABLE (1 VIEW)   Final Result         1. No significant interval  change.      US-EXTREMITY ARTERY LOWER UNILAT RIGHT   Final Result      DX-CHEST-PORTABLE (1 VIEW)   Final Result         1. No significant interval change.      IR-PICC LINE PLACEMENT W/ GUIDANCE > AGE 5   Final Result                  Ultrasound-guided PICC placement performed by qualified nursing staff as    above.          DX-CHEST-PORTABLE (1 VIEW)   Final Result         1.  Pulmonary edema and/or infiltrates, similar to prior study.   2.  Stable opacification right lung, likely effusion. Thoracostomy tubes are in place.   3.  Multiple cavitary appearing left pulmonary lesions, see CT performed April 27, 2021 for further characterization      DX-CHEST-PORTABLE (1 VIEW)   Final Result         1.  Pulmonary edema and/or infiltrates, similar to prior study.   2.  Single opacification right lung, likely effusion. Thoracostomy tubes are in place.   3.  Multiple cavitary appearing left pulmonary lesions, see CT performed April 27, 2021 for further characterization   4.  Soft tissue gas in the right chest wall      DX-CHEST-PORTABLE (1 VIEW)   Final Result         1.  Pulmonary edema and/or infiltrates, similar to prior study.   2.  Increased opacification right lung, likely increased effusion. Thoracostomy tubes are in place.   3.  Multiple cavitary appearing left pulmonary lesions, see CT performed April 27, 2021 for further characterization   4.  Soft tissue gas in the right chest wall      DX-CHEST-PORTABLE (1 VIEW)   Final Result         1.  Pulmonary edema and/or infiltrates, similar to prior study.   2.  Right pneumothorax, stable compared to prior study, right thoracostomy tubes remain in place   3.  Multiple cavitary appearing left pulmonary lesions, see CT performed April 27, 2021 for further characterization   4.  Soft tissue gas in the right chest wall      DX-CHEST-PORTABLE (1 VIEW)   Final Result         1.  Pulmonary edema and/or infiltrates, similar to prior study.   2.  Right pneumothorax, interval  decreased compared to prior study, interval placement of right thoracostomy tubes is noted.   3.  Multiple cavitary appearing left pulmonary lesions, see CT performed yesterday for further characterization   4.  Soft tissue gas in the right chest wall      DX-CHEST-PORTABLE (1 VIEW)   Final Result         1.  Pulmonary edema and/or infiltrates, similar to prior study.   2.  Right pneumothorax, stable compared to prior study, interval removal of right thoracostomy tube is noted.   3.  Multiple cavitary appearing left pulmonary lesions, see CT performed yesterday for further characterization   4.  Soft tissue gas in the right chest wall      DX-CHEST-LIMITED (1 VIEW)   Final Result         No significant interval change.      CT-CTA CHEST PULMONARY ARTERY W/ RECONS   Final Result         No CT evidence of pulmonary embolus.      Previous right chest tube were removed.      Grossly similar in size of the loculated right hydropneumothorax but there is increased layering fluid component. Unchanged mild shift of the mediastinal to the left.      Redemonstration of a pigtail catheter in the medial left lung base. Grossly similar multiple cavitary lesions in the left lung.      US-EXTREMITY VENOUS LOWER BILAT   Final Result      POCT BUTTERFLY-DUPLEX SCAN EXTREMITY VEINS LIMITED   Final Result      DX-CHEST-PORTABLE (1 VIEW)   Final Result         1.  Pulmonary edema and/or infiltrates, similar to prior study.   2.  Right pneumothorax, somewhat decreased to prior study, interval removal of right thoracostomy tube is noted.   3.  Multiple cavitary appearing left pulmonary lesions, see CT performed yesterday for further characterization   4.  Soft tissue gas in the right chest wall      DX-CHEST-PORTABLE (1 VIEW)   Final Result         1.  Pulmonary edema and/or infiltrates, similar to prior study.   2.  Right pneumothorax, similar to prior study with thoracostomy tube in place.   3.  Multiple cavitary appearing left  pulmonary lesions, see CT performed yesterday for further characterization      CT-CHEST (THORAX) W/O   Final Result      Large loculated right hydropneumothorax with interval increase in size of the pneumothorax and pleural fluid.      Right chest tube is in the posterior right thorax.      There is mediastinal shift to the left compatible with tension.      Small pericardial effusion.      Small loculated left pleural effusion with overlying atelectasis/consolidation.   Pigtail catheter is seen at the medial left lung base. Pleural effusion has decreased in size compared to prior.      There are multiple foci of air extending from the right lung to the pleural space suspicious for a bronchopleural fistula.      Multiple cavitary lesions bilaterally with mild interval decrease of the solid component of the cavitary nodules.      Noncavitary 3.1 cm masslike density is seen at the left lung base.      Findings may be related to septic emboli, malignancy or multifocal abscesses. Short interval follow-up recommended.      Soft tissue air on the right is again seen.      Splenomegaly      Findings discussed with Leti Sun.      DX-CHEST-PORTABLE (1 VIEW)   Final Result      No significant change from prior exam.      CT-HEAD W/O   Final Result      1.  RIGHT frontal ventriculostomy catheter unchanged in position.   2.  Mild cortical atrophy.   3.  No intracranial hemorrhage.   4.  Apparent dural thickening overlying the clivus.  Consider further evaluation with contrast-enhanced MRI.      DX-CHEST-LIMITED (1 VIEW)   Final Result      Interval left basilar pigtail catheter with diminished atelectasis, pleural fluid      Stable right hydropneumothorax with thoracostomy tube in place, considerable soft tissue gas and partial lung collapse      IR-CHEST TUBE-EMPYEMA LEFT   Final Result      1.  CT GUIDED LEFT  10 [place] PIGTAIL CHEST TUBE PLACEMENT FOR POSSIBLE EMPYEMA YIELDING OLD BLOODY FLUID.      2. A CHEST  RADIOGRAPH IS FORTHCOMING.      EC-MICAH W/O CONT   Final Result      US-ABDOMEN LTD (SOFT TISSUE)   Final Result      No fluid seen surrounding the electronic implanted spinal stimulator device.      DX-CHEST-LIMITED (1 VIEW)   Final Result      1.  Large right hydropneumothorax with right-sided chest tube in place, likely stable to slightly decreased from prior study.   2.  Small left pleural effusion. Mild improved aeration in the left lung.   3.  Bilateral pulmonary opacities which could be related to combination of atelectasis, edema and/or infection..      CT-CTA HEAD WITH & W/O-POST PROCESS   Final Result      1.  Patent carotid and vertebral arteries.      2.  Again seen right frontal the jugular peritoneal shunt catheter. There is mild increase in volume of the lateral and third ventricles.      CT-CHEST (THORAX) W/O   Final Result      1.  Interval placement of a right-sided chest tube into a large loculated right-sided pleural effusion. There is now seen a large right-sided hydropneumothorax in the area that previously seen fluid. The chest tube extends into that hydropneumothorax.    There is some residual pleural fluid seen as well. A hydropneumothorax is somewhat loculated with components most prominently inferiorly and medially.      2.  New loculated left pleural effusion.      3.  Again seen multiple bilateral cavitary pulmonary masses which are more prominent than previously seen with increasing cavitation and measure up to 3 cm size. Differential diagnosis includes septic emboli, multifocal abscesses and neoplasm.      4.  Large amount of subcutaneous emphysema on the right.      5.  Splenomegaly.      6.  Multiple support devices.      Fleischner Society pulmonary nodule recommendations:         DX-CHEST-LIMITED (1 VIEW)   Final Result      1.  Support devices as described above.      2.  Right chest tube present with a again seen right-sided pneumothorax, possibly increased in size and loculated.       3.  Worsening bilateral pulmonary infiltrates.      DX-ABDOMEN FOR TUBE PLACEMENT   Final Result      Cortrak feeding tube tip projects in the region of the duodenal bulb.      DX-CHEST-LIMITED (1 VIEW)   Final Result      Loculated right pneumothorax is decreased in size compared to prior.      Small left pleural effusion.      Small loculated right pleural effusion.      Extensive bilateral airspace opacities are not significantly changed.      Soft tissue air on the right is again noted.      DX-CHEST-LIMITED (1 VIEW)   Final Result      Decreased partially loculated right pleural fluid with increased pleural air. Right chest tube is in place.      Increased hazy opacity in the left lung possibly atelectasis.         US-ABDOMEN LTD (SOFT TISSUE)   Final Result      No drainable fluid collection.      DX-CHEST-PORTABLE (1 VIEW)   Final Result      Decreased partially loculated right pleural fluid with increased pleural air. Right chest tube is in place.      No other significant change.      DX-CHEST-PORTABLE (1 VIEW)   Final Result         1.  Pulmonary edema and/or infiltrates are identified, which are somewhat decreased since the prior exam.   2.  Moderate loculated appearing right pleural effusion, slightly decreased since prior      DX-ABDOMEN FOR TUBE PLACEMENT   Final Result      Feeding tube tip at the distal stomach.      DX-CHEST-PORTABLE (1 VIEW)   Final Result         1.  New chest tube is noted in the right lower hemithorax.      2.  Right pleural effusion is again identified which appears similar to the prior exam.      3.  No new infiltrates or consolidations are identified.      DX-CHEST-PORTABLE (1 VIEW)   Final Result         1.  Pulmonary edema and/or infiltrates are identified, which are stable since the prior exam.   2.  Moderate loculated appearing right pleural effusion      DX-CHEST-PORTABLE (1 VIEW)   Final Result         No significant interval change.      POCT  BUTTERFLY-ECHOCARDIOGRAM LTD W/O CONT    (Results Pending)        Assessment/Plan  * Bacteremia due to methicillin susceptible Staphylococcus aureus (MSSA)- (present on admission)  Assessment & Plan  Infectious disease assisting with antibiotic treatment  Multiple potential sources  Endocarditis  Need to achieve source control    Debility  Assessment & Plan  Acute on chronic, the patient likely need of LTAC treatment post acute treatment    Elevated alkaline phosphatase level- (present on admission)  Assessment & Plan  The patient not able to tolerate a MRCP at this time, ultrasound grossly unremarkable, observe    Agitation requiring sedation protocol- (present on admission)  Assessment & Plan  Monitor,    Goals of care, counseling/discussion  Assessment & Plan  Patient with overall high risk of morbidity and mortality given her gravely ill state and prior chronic medical conditions  Palliative care assisting    Spinal cord stimulator status  Assessment & Plan  Patient's stimulator is [employer]. It is FDA approved as MRI compatible, but the company has gone out of business, so there is no support team to assist with MRI.  Thus, if MRI were performed, our radiologists would need to protocol it correctly to manage the stimulator. The former rep from [employer] is Andre, [phone].  Information obtained from Dr. Mahoney's office, [phone].     Per  (5/4/2021), it is not MRI compatible unfortunately.     Anasarca  Assessment & Plan  Improved nutritional status, skin protective measures    Trapped lung  Assessment & Plan  With need for further thoracotomy, decortication    Pulmonary abscess (HCC)  Assessment & Plan  From septic emboli, antibiotic treatment, CT follow-up    Abnormal movement  Assessment & Plan  Apparently tardive dyskinesia, avoid offending agents    Fever  Assessment & Plan  Resolved, monitor, antibiotic therapy    Atelectasis  Assessment & Plan  Pulmonary recruiting, respiratory  care    Pneumonia  Assessment & Plan  MSSA, ongoing treatment    History of vasculitis- (present on admission)  Assessment & Plan  Does not appear to be a current issue    CSF pleocytosis- (present on admission)  Assessment & Plan  Infectious disease managing    Empyema of right pleural space (HCC)- (present on admission)  Assessment & Plan  Patient s/p multiple chest tubes, thoracotomy and decortication  With recurrence unfortunately  Will need additional thoracic surgery with Dr. Ganser on 5/17    Acute bacterial endocarditis- (present on admission)  Assessment & Plan  Mitral valve, tricuspid valve, ongoing antibiotic therapy, source control    Acute blood loss anemia  Assessment & Plan  Likely secondary to hemothorax  300 cc of blood removed after chest tube placed at [facility]  Trend hemoglobin    Prolonged Q-T interval on ECG- (present on admission)  Assessment & Plan  Avoid medication affecting QTC, monitor    Acute respiratory failure with hypoxia (HCC)- (present on admission)  Assessment & Plan  Secondary to MSSA bacteremia, empyema, pulmonary septic emboli  S/p tracheostomy  Ongoing respiratory care, pulmonary toilet, antibiotic treatment  Prolonged ventilator dependence    History of complicated migraines- (present on admission)  Assessment & Plan  Monitor    GERD (gastroesophageal reflux disease)- (present on admission)  Assessment & Plan  History of, as needed treatment    Hyponatremia  Assessment & Plan  Monitor    Tachycardia  Assessment & Plan  Persistent, likely secondary with multiple underlying ongoing conditions  Monitor    Pseudotumor cerebri  Assessment & Plan  History of  shunt, neurosurgery opted against removal    H/O: CVA (cerebrovascular accident)- (present on admission)  Assessment & Plan  Monitor    Chronic pain syndrome- (present on admission)  Assessment & Plan  Pre-existing, pain management    Thrombocytopenia (HCC)- (present on admission)  Assessment & Plan  Transient,  resolved    Anemia  Assessment & Plan  Monitor, transfuse as indicated    Septic shock (HCC)- (present on admission)  Assessment & Plan  Multiple sources, recovered with aggressive treatment, IV antibiotics long-term    Sepsis (HCC)  Assessment & Plan  Multiple sources possible, recovered with medical treatment    Hypomagnesemia  Assessment & Plan  Monitor, replace and recheck    Hypokalemia  Assessment & Plan  Monitor, replace and recheck    Anxiety- (present on admission)  Assessment & Plan  Likely acute on chronic, monitor    S/P  shunt- (present on admission)  Assessment & Plan  History of, Dr. Oh felt there is currently no need to remove    Acute encephalopathy- (present on admission)  Assessment & Plan  Per neurosurgery no need to remove  shunt  Improving with ongoing medical treatment    Plan  Continue antibiotic therapy with the help of infectious disease  Continue prior psychiatric medication regimen  Pain control, multimodality  Will reduce scheduled narcotic medication  Continue anxiety control with BuSpar  Continue with rehab efforts  Await additional surgery on Monday, placed n.p.o. for tonight  See orders  Medically complex high risk patient  Overall appears prognostically poor given her widespread infection, debility, lack of infection source control at this time    VTE prophylaxis: Lovenox    I have performed a physical exam and reviewed and updated ROS and Plan today . In review of yesterday's note , there are no changes except as documented above.        Please note that this dictation was created using voice recognition software. I have made every reasonable attempt to correct obvious errors, but I expect that there are errors of grammar and possibly context that I did not discover before finalizing the note.

## 2021-05-16 NOTE — RESPIRATORY CARE
Respiratory Update    Treatment modality: Aerosol heated T-piece    Frequency: Q4    5L  30%    Pt tolerating current treatments well with no adverse reactions.

## 2021-05-16 NOTE — CARE PLAN
Problem: Pain Management  Goal: Pain level will decrease to patient's comfort goal  Outcome: Progressing     Problem: Fall Risk  Goal: Patient will remain free from falls  Outcome: Progressing   The patient is Stable - Low risk of patient condition declining or worsening         Progress made toward(s) clinical / shift goals:  reduce anxiety    Patient is not progressing towards the following goals:

## 2021-05-16 NOTE — CARE PLAN
Problem: Oxygenation:  Goal: Maintain adequate oxygenation dependent on patient condition  Outcome: Progressing  Note:   Respiratory Therapy Update       T-PIECE 7L/35%

## 2021-05-17 ENCOUNTER — ANESTHESIA (OUTPATIENT)
Dept: SURGERY | Facility: MEDICAL CENTER | Age: 51
DRG: 003 | End: 2021-05-17
Payer: MEDICARE

## 2021-05-17 ENCOUNTER — APPOINTMENT (OUTPATIENT)
Dept: RADIOLOGY | Facility: MEDICAL CENTER | Age: 51
DRG: 003 | End: 2021-05-17
Attending: INTERNAL MEDICINE
Payer: MEDICARE

## 2021-05-17 ENCOUNTER — APPOINTMENT (OUTPATIENT)
Dept: RADIOLOGY | Facility: MEDICAL CENTER | Age: 51
DRG: 003 | End: 2021-05-17
Attending: ANESTHESIOLOGY
Payer: MEDICARE

## 2021-05-17 ENCOUNTER — ANESTHESIA EVENT (OUTPATIENT)
Dept: SURGERY | Facility: MEDICAL CENTER | Age: 51
DRG: 003 | End: 2021-05-17
Payer: MEDICARE

## 2021-05-17 LAB
ABO GROUP BLD: NORMAL
ALBUMIN SERPL BCP-MCNC: 3.2 G/DL (ref 3.2–4.9)
ALBUMIN/GLOB SERPL: 0.7 G/DL
ALP SERPL-CCNC: 679 U/L (ref 30–99)
ALT SERPL-CCNC: 17 U/L (ref 2–50)
ANION GAP SERPL CALC-SCNC: 10 MMOL/L (ref 7–16)
AST SERPL-CCNC: 13 U/L (ref 12–45)
BARCODED ABORH UBTYP: 5100
BARCODED ABORH UBTYP: 6200
BARCODED ABORH UBTYP: 9500
BARCODED PRD CODE UBPRD: NORMAL
BARCODED UNIT NUM UBUNT: NORMAL
BASE EXCESS BLDA CALC-SCNC: -1 MMOL/L (ref -4–3)
BASOPHILS # BLD AUTO: 0.4 % (ref 0–1.8)
BASOPHILS # BLD: 0.03 K/UL (ref 0–0.12)
BILIRUB SERPL-MCNC: 0.6 MG/DL (ref 0.1–1.5)
BLD GP AB SCN SERPL QL: NORMAL
BODY TEMPERATURE: ABNORMAL DEGREES
BUN SERPL-MCNC: 18 MG/DL (ref 8–22)
CALCIUM SERPL-MCNC: 9.6 MG/DL (ref 8.5–10.5)
CHLORIDE SERPL-SCNC: 103 MMOL/L (ref 96–112)
CO2 BLDA-SCNC: 27 MMOL/L (ref 20–33)
CO2 SERPL-SCNC: 25 MMOL/L (ref 20–33)
COMPONENT R 8504R: NORMAL
CREAT SERPL-MCNC: 0.25 MG/DL (ref 0.5–1.4)
CRP SERPL HS-MCNC: 5.11 MG/DL (ref 0–0.75)
DELSYS IDSYS: ABNORMAL
EOSINOPHIL # BLD AUTO: 0 K/UL (ref 0–0.51)
EOSINOPHIL NFR BLD: 0 % (ref 0–6.9)
ERYTHROCYTE [DISTWIDTH] IN BLOOD BY AUTOMATED COUNT: 59 FL (ref 35.9–50)
ERYTHROCYTE [DISTWIDTH] IN BLOOD BY AUTOMATED COUNT: 59.3 FL (ref 35.9–50)
GLOBULIN SER CALC-MCNC: 4.9 G/DL (ref 1.9–3.5)
GLUCOSE SERPL-MCNC: 91 MG/DL (ref 65–99)
HCG UR QL: NEGATIVE
HCO3 BLDA-SCNC: 25.4 MMOL/L (ref 17–25)
HCT VFR BLD AUTO: 34 % (ref 37–47)
HCT VFR BLD AUTO: 34.5 % (ref 37–47)
HGB BLD-MCNC: 10.3 G/DL (ref 12–16)
HGB BLD-MCNC: 10.7 G/DL (ref 12–16)
HOROWITZ INDEX BLDA+IHG-RTO: 223 MM[HG]
IMM GRANULOCYTES # BLD AUTO: 0.03 K/UL (ref 0–0.11)
IMM GRANULOCYTES NFR BLD AUTO: 0.4 % (ref 0–0.9)
INR PPP: 1.21 (ref 0.87–1.13)
LPM ILPM: 10 LPM
LYMPHOCYTES # BLD AUTO: 1.55 K/UL (ref 1–4.8)
LYMPHOCYTES NFR BLD: 22.3 % (ref 22–41)
MAGNESIUM SERPL-MCNC: 1.7 MG/DL (ref 1.5–2.5)
MCH RBC QN AUTO: 28.9 PG (ref 27–33)
MCH RBC QN AUTO: 29.2 PG (ref 27–33)
MCHC RBC AUTO-ENTMCNC: 30.3 G/DL (ref 33.6–35)
MCHC RBC AUTO-ENTMCNC: 31 G/DL (ref 33.6–35)
MCV RBC AUTO: 94 FL (ref 81.4–97.8)
MCV RBC AUTO: 95.2 FL (ref 81.4–97.8)
MONOCYTES # BLD AUTO: 0.53 K/UL (ref 0–0.85)
MONOCYTES NFR BLD AUTO: 7.6 % (ref 0–13.4)
NEUTROPHILS # BLD AUTO: 4.81 K/UL (ref 2–7.15)
NEUTROPHILS NFR BLD: 69.3 % (ref 44–72)
NRBC # BLD AUTO: 0 K/UL
NRBC BLD-RTO: 0 /100 WBC
O2/TOTAL GAS SETTING VFR VENT: 40 %
PCO2 BLDA: 51.1 MMHG (ref 26–37)
PCO2 TEMP ADJ BLDA: 49.5 MMHG (ref 26–37)
PH BLDA: 7.3 [PH] (ref 7.4–7.5)
PH TEMP ADJ BLDA: 7.31 [PH] (ref 7.4–7.5)
PHOSPHATE SERPL-MCNC: 4.4 MG/DL (ref 2.5–4.5)
PLATELET # BLD AUTO: 322 K/UL (ref 164–446)
PLATELET # BLD AUTO: 338 K/UL (ref 164–446)
PMV BLD AUTO: 9.1 FL (ref 9–12.9)
PMV BLD AUTO: 9.2 FL (ref 9–12.9)
PO2 BLDA: 89 MMHG (ref 64–87)
PO2 TEMP ADJ BLDA: 85 MMHG (ref 64–87)
POTASSIUM SERPL-SCNC: 3.7 MMOL/L (ref 3.6–5.5)
PREALB SERPL-MCNC: 17.7 MG/DL (ref 18–38)
PRODUCT TYPE UPROD: NORMAL
PROT SERPL-MCNC: 8.1 G/DL (ref 6–8.2)
PROTHROMBIN TIME: 15.7 SEC (ref 12–14.6)
RBC # BLD AUTO: 3.57 M/UL (ref 4.2–5.4)
RBC # BLD AUTO: 3.67 M/UL (ref 4.2–5.4)
RH BLD: NORMAL
SAO2 % BLDA: 96 % (ref 93–99)
SARS-COV+SARS-COV-2 AG RESP QL IA.RAPID: NOTDETECTED
SARS-COV-2 RNA RESP QL NAA+PROBE: NOTDETECTED
SODIUM SERPL-SCNC: 138 MMOL/L (ref 135–145)
SPECIMEN DRAWN FROM PATIENT: ABNORMAL
SPECIMEN SOURCE: NORMAL
SPECIMEN SOURCE: NORMAL
UNIT STATUS USTAT: NORMAL
WBC # BLD AUTO: 21.8 K/UL (ref 4.8–10.8)
WBC # BLD AUTO: 7 K/UL (ref 4.8–10.8)

## 2021-05-17 PROCEDURE — 160048 HCHG OR STATISTICAL LEVEL 1-5: Performed by: SURGERY

## 2021-05-17 PROCEDURE — 700101 HCHG RX REV CODE 250: Performed by: ANESTHESIOLOGY

## 2021-05-17 PROCEDURE — 160035 HCHG PACU - 1ST 60 MINS PHASE I: Performed by: SURGERY

## 2021-05-17 PROCEDURE — A9270 NON-COVERED ITEM OR SERVICE: HCPCS | Performed by: INTERNAL MEDICINE

## 2021-05-17 PROCEDURE — 83735 ASSAY OF MAGNESIUM: CPT | Mod: 91

## 2021-05-17 PROCEDURE — C1729 CATH, DRAINAGE: HCPCS | Performed by: SURGERY

## 2021-05-17 PROCEDURE — A6402 STERILE GAUZE <= 16 SQ IN: HCPCS | Performed by: SURGERY

## 2021-05-17 PROCEDURE — 85027 COMPLETE CBC AUTOMATED: CPT

## 2021-05-17 PROCEDURE — U0005 INFEC AGEN DETEC AMPLI PROBE: HCPCS

## 2021-05-17 PROCEDURE — 700105 HCHG RX REV CODE 258: Performed by: INTERNAL MEDICINE

## 2021-05-17 PROCEDURE — 501838 HCHG SUTURE GENERAL: Performed by: SURGERY

## 2021-05-17 PROCEDURE — 88305 TISSUE EXAM BY PATHOLOGIST: CPT

## 2021-05-17 PROCEDURE — 85025 COMPLETE CBC W/AUTO DIFF WBC: CPT

## 2021-05-17 PROCEDURE — 0W990ZZ DRAINAGE OF RIGHT PLEURAL CAVITY, OPEN APPROACH: ICD-10-PCS | Performed by: SURGERY

## 2021-05-17 PROCEDURE — 71045 X-RAY EXAM CHEST 1 VIEW: CPT

## 2021-05-17 PROCEDURE — 700111 HCHG RX REV CODE 636 W/ 250 OVERRIDE (IP): Performed by: ANESTHESIOLOGY

## 2021-05-17 PROCEDURE — 84100 ASSAY OF PHOSPHORUS: CPT | Mod: 91

## 2021-05-17 PROCEDURE — 80053 COMPREHEN METABOLIC PANEL: CPT

## 2021-05-17 PROCEDURE — A9270 NON-COVERED ITEM OR SERVICE: HCPCS | Performed by: HOSPITALIST

## 2021-05-17 PROCEDURE — 700111 HCHG RX REV CODE 636 W/ 250 OVERRIDE (IP): Performed by: INTERNAL MEDICINE

## 2021-05-17 PROCEDURE — P9016 RBC LEUKOCYTES REDUCED: HCPCS

## 2021-05-17 PROCEDURE — 86140 C-REACTIVE PROTEIN: CPT

## 2021-05-17 PROCEDURE — 99232 SBSQ HOSP IP/OBS MODERATE 35: CPT | Performed by: HOSPITALIST

## 2021-05-17 PROCEDURE — 770022 HCHG ROOM/CARE - ICU (200)

## 2021-05-17 PROCEDURE — 160009 HCHG ANES TIME/MIN: Performed by: SURGERY

## 2021-05-17 PROCEDURE — 85610 PROTHROMBIN TIME: CPT

## 2021-05-17 PROCEDURE — 36430 TRANSFUSION BLD/BLD COMPNT: CPT

## 2021-05-17 PROCEDURE — 160029 HCHG SURGERY MINUTES - 1ST 30 MINS LEVEL 4: Performed by: SURGERY

## 2021-05-17 PROCEDURE — 700102 HCHG RX REV CODE 250 W/ 637 OVERRIDE(OP): Performed by: INTERNAL MEDICINE

## 2021-05-17 PROCEDURE — 86850 RBC ANTIBODY SCREEN: CPT

## 2021-05-17 PROCEDURE — 0BNK0ZZ RELEASE RIGHT LUNG, OPEN APPROACH: ICD-10-PCS | Performed by: SURGERY

## 2021-05-17 PROCEDURE — 86900 BLOOD TYPING SEROLOGIC ABO: CPT

## 2021-05-17 PROCEDURE — 87426 SARSCOV CORONAVIRUS AG IA: CPT

## 2021-05-17 PROCEDURE — 84134 ASSAY OF PREALBUMIN: CPT

## 2021-05-17 PROCEDURE — 160002 HCHG RECOVERY MINUTES (STAT): Performed by: SURGERY

## 2021-05-17 PROCEDURE — 37799 UNLISTED PX VASCULAR SURGERY: CPT

## 2021-05-17 PROCEDURE — 88311 DECALCIFY TISSUE: CPT

## 2021-05-17 PROCEDURE — 81025 URINE PREGNANCY TEST: CPT

## 2021-05-17 PROCEDURE — 99291 CRITICAL CARE FIRST HOUR: CPT | Performed by: INTERNAL MEDICINE

## 2021-05-17 PROCEDURE — 86901 BLOOD TYPING SEROLOGIC RH(D): CPT

## 2021-05-17 PROCEDURE — U0003 INFECTIOUS AGENT DETECTION BY NUCLEIC ACID (DNA OR RNA); SEVERE ACUTE RESPIRATORY SYNDROME CORONAVIRUS 2 (SARS-COV-2) (CORONAVIRUS DISEASE [COVID-19]), AMPLIFIED PROBE TECHNIQUE, MAKING USE OF HIGH THROUGHPUT TECHNOLOGIES AS DESCRIBED BY CMS-2020-01-R: HCPCS

## 2021-05-17 PROCEDURE — 94640 AIRWAY INHALATION TREATMENT: CPT

## 2021-05-17 PROCEDURE — 700102 HCHG RX REV CODE 250 W/ 637 OVERRIDE(OP): Performed by: HOSPITALIST

## 2021-05-17 PROCEDURE — 94760 N-INVAS EAR/PLS OXIMETRY 1: CPT

## 2021-05-17 PROCEDURE — 160041 HCHG SURGERY MINUTES - EA ADDL 1 MIN LEVEL 4: Performed by: SURGERY

## 2021-05-17 PROCEDURE — 86923 COMPATIBILITY TEST ELECTRIC: CPT

## 2021-05-17 PROCEDURE — 82803 BLOOD GASES ANY COMBINATION: CPT

## 2021-05-17 PROCEDURE — 80048 BASIC METABOLIC PNL TOTAL CA: CPT

## 2021-05-17 RX ORDER — OXYCODONE HCL 5 MG/5 ML
5 SOLUTION, ORAL ORAL
Status: DISCONTINUED | OUTPATIENT
Start: 2021-05-17 | End: 2021-05-17 | Stop reason: HOSPADM

## 2021-05-17 RX ORDER — VASOPRESSIN 20 U/ML
INJECTION PARENTERAL PRN
Status: DISCONTINUED | OUTPATIENT
Start: 2021-05-17 | End: 2021-05-17 | Stop reason: SURG

## 2021-05-17 RX ORDER — HYDROMORPHONE HYDROCHLORIDE 1 MG/ML
0.4 INJECTION, SOLUTION INTRAMUSCULAR; INTRAVENOUS; SUBCUTANEOUS
Status: DISCONTINUED | OUTPATIENT
Start: 2021-05-17 | End: 2021-05-17 | Stop reason: HOSPADM

## 2021-05-17 RX ORDER — SODIUM CHLORIDE, SODIUM LACTATE, POTASSIUM CHLORIDE, CALCIUM CHLORIDE 600; 310; 30; 20 MG/100ML; MG/100ML; MG/100ML; MG/100ML
INJECTION, SOLUTION INTRAVENOUS CONTINUOUS
Status: DISCONTINUED | OUTPATIENT
Start: 2021-05-17 | End: 2021-05-17 | Stop reason: HOSPADM

## 2021-05-17 RX ORDER — METHADONE HYDROCHLORIDE 10 MG/1
30 TABLET ORAL DAILY
Status: DISCONTINUED | OUTPATIENT
Start: 2021-05-18 | End: 2021-05-28

## 2021-05-17 RX ORDER — SODIUM CHLORIDE, SODIUM LACTATE, POTASSIUM CHLORIDE, AND CALCIUM CHLORIDE .6; .31; .03; .02 G/100ML; G/100ML; G/100ML; G/100ML
500 INJECTION, SOLUTION INTRAVENOUS ONCE
Status: COMPLETED | OUTPATIENT
Start: 2021-05-17 | End: 2021-05-17

## 2021-05-17 RX ORDER — ONDANSETRON 2 MG/ML
4 INJECTION INTRAMUSCULAR; INTRAVENOUS
Status: DISCONTINUED | OUTPATIENT
Start: 2021-05-17 | End: 2021-05-17 | Stop reason: HOSPADM

## 2021-05-17 RX ORDER — HYDROMORPHONE HYDROCHLORIDE 1 MG/ML
0.2 INJECTION, SOLUTION INTRAMUSCULAR; INTRAVENOUS; SUBCUTANEOUS
Status: DISCONTINUED | OUTPATIENT
Start: 2021-05-17 | End: 2021-05-17 | Stop reason: HOSPADM

## 2021-05-17 RX ORDER — HYDROMORPHONE HYDROCHLORIDE 2 MG/ML
2 INJECTION, SOLUTION INTRAMUSCULAR; INTRAVENOUS; SUBCUTANEOUS
Status: DISCONTINUED | OUTPATIENT
Start: 2021-05-17 | End: 2021-05-19

## 2021-05-17 RX ORDER — HALOPERIDOL 5 MG/ML
1 INJECTION INTRAMUSCULAR
Status: DISCONTINUED | OUTPATIENT
Start: 2021-05-17 | End: 2021-05-17 | Stop reason: HOSPADM

## 2021-05-17 RX ORDER — HYDROMORPHONE HYDROCHLORIDE 1 MG/ML
0.1 INJECTION, SOLUTION INTRAMUSCULAR; INTRAVENOUS; SUBCUTANEOUS
Status: DISCONTINUED | OUTPATIENT
Start: 2021-05-17 | End: 2021-05-17 | Stop reason: HOSPADM

## 2021-05-17 RX ORDER — OXYCODONE HCL 5 MG/5 ML
10 SOLUTION, ORAL ORAL
Status: DISCONTINUED | OUTPATIENT
Start: 2021-05-17 | End: 2021-05-17 | Stop reason: HOSPADM

## 2021-05-17 RX ORDER — METHADONE HYDROCHLORIDE 10 MG/1
10 TABLET ORAL EVERY 8 HOURS
Status: DISCONTINUED | OUTPATIENT
Start: 2021-05-17 | End: 2021-05-17

## 2021-05-17 RX ORDER — DEXMEDETOMIDINE HYDROCHLORIDE 4 UG/ML
.1-1.5 INJECTION, SOLUTION INTRAVENOUS CONTINUOUS
Status: DISCONTINUED | OUTPATIENT
Start: 2021-05-17 | End: 2021-05-18

## 2021-05-17 RX ORDER — DIPHENHYDRAMINE HYDROCHLORIDE 50 MG/ML
12.5 INJECTION INTRAMUSCULAR; INTRAVENOUS
Status: DISCONTINUED | OUTPATIENT
Start: 2021-05-17 | End: 2021-05-17 | Stop reason: HOSPADM

## 2021-05-17 RX ADMIN — HYDROMORPHONE HYDROCHLORIDE 2 MG: 2 INJECTION, SOLUTION INTRAMUSCULAR; INTRAVENOUS; SUBCUTANEOUS at 20:15

## 2021-05-17 RX ADMIN — ROCURONIUM BROMIDE 10 MG: 10 INJECTION, SOLUTION INTRAVENOUS at 17:37

## 2021-05-17 RX ADMIN — VASOPRESSIN 2 UNITS: 20 INJECTION INTRAVENOUS at 17:03

## 2021-05-17 RX ADMIN — CLONIDINE HYDROCHLORIDE 0.1 MG: 0.1 TABLET ORAL at 14:24

## 2021-05-17 RX ADMIN — PAROXETINE HYDROCHLORIDE 10 MG: 20 TABLET, FILM COATED ORAL at 05:37

## 2021-05-17 RX ADMIN — METHADONE HYDROCHLORIDE 10 MG: 10 CONCENTRATE ORAL at 14:24

## 2021-05-17 RX ADMIN — BUSPIRONE HYDROCHLORIDE 10 MG: 10 TABLET ORAL at 11:57

## 2021-05-17 RX ADMIN — ACETAMINOPHEN 500 MG: 325 TABLET, FILM COATED ORAL at 08:13

## 2021-05-17 RX ADMIN — DIVALPROEX SODIUM 500 MG: 125 CAPSULE ORAL at 05:37

## 2021-05-17 RX ADMIN — METOPROLOL TARTRATE 25 MG: 25 TABLET, FILM COATED ORAL at 05:37

## 2021-05-17 RX ADMIN — CLONIDINE HYDROCHLORIDE 0.1 MG: 0.1 TABLET ORAL at 05:38

## 2021-05-17 RX ADMIN — ACETAMINOPHEN 500 MG: 325 TABLET, FILM COATED ORAL at 21:12

## 2021-05-17 RX ADMIN — OXYCODONE HYDROCHLORIDE 10 MG: 10 TABLET ORAL at 11:57

## 2021-05-17 RX ADMIN — HYDROMORPHONE HYDROCHLORIDE 2 MG: 2 INJECTION, SOLUTION INTRAMUSCULAR; INTRAVENOUS; SUBCUTANEOUS at 20:27

## 2021-05-17 RX ADMIN — CEFEPIME 2 G: 2 INJECTION, POWDER, FOR SOLUTION INTRAVENOUS at 09:17

## 2021-05-17 RX ADMIN — SODIUM CHLORIDE, POTASSIUM CHLORIDE, SODIUM LACTATE AND CALCIUM CHLORIDE 500 ML: 600; 310; 30; 20 INJECTION, SOLUTION INTRAVENOUS at 23:29

## 2021-05-17 RX ADMIN — OXYCODONE HYDROCHLORIDE 10 MG: 10 TABLET ORAL at 00:12

## 2021-05-17 RX ADMIN — PROPOFOL 200 MG: 10 INJECTION, EMULSION INTRAVENOUS at 16:45

## 2021-05-17 RX ADMIN — DIVALPROEX SODIUM 500 MG: 125 CAPSULE ORAL at 21:12

## 2021-05-17 RX ADMIN — RISPERIDONE 2 MG: 1 TABLET, FILM COATED ORAL at 05:38

## 2021-05-17 RX ADMIN — ROCURONIUM BROMIDE 50 MG: 10 INJECTION, SOLUTION INTRAVENOUS at 17:19

## 2021-05-17 RX ADMIN — GABAPENTIN 300 MG: 300 CAPSULE ORAL at 14:25

## 2021-05-17 RX ADMIN — ACETAMINOPHEN 500 MG: 325 TABLET, FILM COATED ORAL at 14:26

## 2021-05-17 RX ADMIN — VASOPRESSIN 2 UNITS: 20 INJECTION INTRAVENOUS at 17:53

## 2021-05-17 RX ADMIN — BUSPIRONE HYDROCHLORIDE 10 MG: 10 TABLET ORAL at 05:38

## 2021-05-17 RX ADMIN — OXYCODONE HYDROCHLORIDE 10 MG: 10 TABLET ORAL at 05:38

## 2021-05-17 RX ADMIN — FENTANYL CITRATE 250 MCG: 50 INJECTION, SOLUTION INTRAMUSCULAR; INTRAVENOUS at 16:45

## 2021-05-17 RX ADMIN — DULOXETINE HYDROCHLORIDE 60 MG: 60 CAPSULE, DELAYED RELEASE ORAL at 05:37

## 2021-05-17 RX ADMIN — VASOPRESSIN 2 UNITS: 20 INJECTION INTRAVENOUS at 18:01

## 2021-05-17 RX ADMIN — METHADONE HYDROCHLORIDE 10 MG: 10 CONCENTRATE ORAL at 05:37

## 2021-05-17 RX ADMIN — DIVALPROEX SODIUM 500 MG: 125 CAPSULE ORAL at 14:24

## 2021-05-17 RX ADMIN — DOCUSATE SODIUM 50 MG AND SENNOSIDES 8.6 MG 2 TABLET: 8.6; 5 TABLET, FILM COATED ORAL at 05:37

## 2021-05-17 RX ADMIN — CEFEPIME 2 G: 2 INJECTION, POWDER, FOR SOLUTION INTRAVENOUS at 02:08

## 2021-05-17 RX ADMIN — GABAPENTIN 300 MG: 300 CAPSULE ORAL at 05:38

## 2021-05-17 ASSESSMENT — PAIN DESCRIPTION - PAIN TYPE
TYPE: ACUTE PAIN;CHRONIC PAIN
TYPE: ACUTE PAIN
TYPE: ACUTE PAIN
TYPE: CHRONIC PAIN

## 2021-05-17 ASSESSMENT — PAIN SCALES - GENERAL: PAIN_LEVEL: 2

## 2021-05-17 ASSESSMENT — ENCOUNTER SYMPTOMS
FEVER: 0
ABDOMINAL PAIN: 0

## 2021-05-17 NOTE — PROGRESS NOTES
Pre op note  I discussed at length the indications for, risks and alternatives to right thoracotomy for decortication with the patient,  and wife at length. This includes, but is not limited to, bleeding, air leaks, pain infection and possible death related to complications. Questions were answered an they wish to proceed.

## 2021-05-17 NOTE — CARE PLAN
Problem: Pain Management  Goal: Pain level will decrease to patient's comfort goal  Outcome: Progressing     Problem: Knowledge Deficit - Standard  Goal: Patient and family/care givers will demonstrate understanding of plan of care, disease process/condition, diagnostic tests and medications  Outcome: Progressing     The patient is Stable - Low risk of patient condition declining or worsening         Progress made toward(s) clinical / shift goals:  frequent rounding, education, reorienting, and pt utilizing call light/white board to effectively communicate wants/needs.     Patient is not progressing towards the following goals:

## 2021-05-17 NOTE — PROGRESS NOTES
Infectious Disease Progress Note    Author: Dixie Albarran M.D. Date & Time created: 5/17/2021  10:36 AM  Cefepime 4/23 -  current Day #22     Thoracoscopy & Decortication - 4/28     PRIOR Rx:   Zosyn 4/22-4/23  Previous: Unasyn, Zosyn, Vanco, Daptomycin  Ceftaroline: start 4/13-4/15  Nafcillin 2g Q4 hrs 4/15-4/23 4/14: Unable to give name of previous NSG or neurologist. Seems confused, but able to say some sentences fluently.   4/15: Line cultures now growing MSSA. As well as from the blood. Repeat blood culture 4/13+ in both sets. Patient has worsening confusion. CSF fluid analyses suggest pleocytosis. Cultures are no growth from the CSF. Chest CT was ordered this morning indicating a loculated right pleural effusion as well as bilateral septic emboli. Dr. Mack spoke with Dr. Oh yesterday and no surgical intervention unless clinical deterioration.  4/16: Increased respiratory distress.  Tachypneic.  CT with loculated collection.  Dr Resendiz not available.  No thoracic surgeon available.  Plans to have pulmonary place CT.  Transferring to ICU.  4/17: Transferred to [facility] last night. Hgb dropped to 6.4 requiring PRBC transfusion. Requiring 2 pressors. Fio2 50%. Febrile 38.8. Pending OR decortication of empyema and hemothorax. Chest tube placed at [facility] shows 8,371 WBC, ,000, 74% N  4/18: Chest tube was upsized by surgery yesterday, no decortication. WBC 22k. Fio2 40%. Vasopressin. Levophed down to 2mcg. Afebrile 24 hrs. Last fever 38.6 on 4/16.   4/19: Still on vent. Levo 3 mcg, vasopressin. Afebrile 72 hours. WBC 21k. BC 4/17 NGTD x 48 hours. Unable to do MRI brain given neurostimulator per RN.   4/20: Remaining afebrile. Hb 6.2 and undergoing transfusion today.WBC 24,100, 5% bands.1700 ml CT output. Copious bloody ET secretions. No pressors. Receiving dornase and TPA per CT. Right pleural effusion not large per CXR today.   4/21: WBC 31k. Bronchoscopy yesterday showing blood. Cultures sent. TPA on  hold per RN due to arvind blood from chest tube requiring PRBC transfusion for hgb 6. Pending chest CT today. Per , spinal stimulator is non-MRI compatible. Levophed 10mcg. 30% Fio2. Afebrile.   4/22: Fever 101.3 this am. WBC 20,300 down from 32,200 yesterday. BAL growing Klebsiella pneumoniae but susceptibility panel not set up until today. CTA of brain shows no lesion. CT of chest shows enlarging cavitary lung lesions bilat and large loculated new left pleural effusion and large hydropneumothorax on right. TPA & dornase infusions of right chest ended 4/20 after considerable bleeding requiring transfusion. Hb now down again to 6.8.  4/23: WBC 23k. Fio2 40%. Tmax 38.1. US of stiumlator shows small fluid collection but no drainable abscess. Pending MICAH today. Pending L chest tube placement  4/24: Continue fever 100.8-101.8. WBC 16,600. MICAH shows large vegetations on both tricuspid valve and mitral valve with mild TR & MR.  No aortic root abscess. Left chest tube placed yesterday yielding 8 ml of old bloody fluid. Bronch today revealed copious thick maroon secretions in distal trachea and both mainstem bronchi. On the right these were obstructive. Remaining off pressors. Last positive blood cultures (MSSA) were 4/16, negative 4/17.  4/25: Fever 100.6 this AM. wBC 13,300. Hb 6.6. CT: right hydropneumothorax increasing, right bronchopleural fistula, mediastinal shift ot the left , multiple cavitary pulmonary nodules, 3.1 cm left basilar mass, splenomegaly. CT of head shows dural thickening over the clivus. Lac+ GNR >100,000 col/ml growing from BAL of 4/24, presumed Klebsiella. Left peural fluid culture negative from 4/23.  4/26: Fever 100.4 last night. WBC 13,500. Hb 7.4. Plts 502,000. ESR >120. UA fairly clear except 30 mg% protein, 2-5 wbc and rare rbc. CXR unchanged except more prominent pulmonary edema. GNR in BAL may be a different species of Klebsiella, and resistant to ampicillin & tmp-sulfa; confirmation  pending.  4/28: Fever 100.8 last night. WBC 27,700. Hb 6.6. Plts 482,000. Creat 0.19. Kleb pneum in BAL on 4/24 is resistant to ampicillin & tmp-sulfa but otherwise sensitive. O2 sat 96% on FIO2 30% now, PEEP 8. Has been re-evaluated by thoracic surgeon, Dr. Ganser, who believes she will not wean without decortication on the right. Surgery anticipated today.  4/29: S/P right thoracoscopy with decortication with cultures NG at 24 hrs.  4/30: Had a bronch due to hypoxia and hypotension. CXR with worsening right hemithorax pulmonary opacities. Bloody mucous plugs removed. Pressors started. Febrile this am. Tachy in 130s. Pressors just removed. Tolerating vent, but not a SBT candidate at the moment.  5/1: Small air leak CT-2 every ~ 2/3 breathes. The K. pneumoniae from her thoracoscopy is sensitive to her cefepime so no antibiotic change needed.   5/2: No air leak today she tolerated 2  hrs of spontaneous breathing with support today.      5/3: Second day with SBT and doing well now for 2 hrs. Slight bump in temperature and      WBC's but no obvious clinical infectious changed so watch closely.      5/4: Fever still from time to time. WBCs trending up. Chest tubes in place. Trach.       5/5: She clearly is better today she was sitting up in bed with open eyes and follows commands. She still has low grade fever but her WBC's are dropping. Cefepime dose increased yesterday for increased CNS coverage if necessary. She will probably need further thoracic surgery as mentioned by Dr. Ganser. The issue of what to do with her stimulator is still up in the air for now as it probably will need to be removed but she is nort a candidate for more major surgery at this time.       5/7: She continues to improve and under go longer SBT trials. She just had a new PICC placed in her right arm and plan is to D/C central line.        5/8: PICC placed. No fevers. Comfortable. Cortrak placed. Failing SBTs.  5/9: No acute issues. No fevers.  Comfortable. Family at bedside.   5/10: No acute issues, fevers or WBC bumps. No changes in condition. She is to get her CT scan today and be reviewed by surgery  5/11:  at bedside.  Has been referred to LANE.  Repeat CT scan completed.  Results noted.  ? If Dr Ganser has seen.  Still with an empyema:  ? If candidate for further surgery.  5/12: Pending next thoracic surgery. No fever. Anxious.  5/13: ? Surgery date.  No one seems to know.  Had speaking valve in and having swallow eval with speech therapy  5/14: No acute issues. No fevers. Surgery Monday.  5/15: One CT accidentally pulled out yesterday.  Has been on T piece and tolerating. Plans for surgery Monday afternoon.  5/16: On schedule for surgery 5/17: 4:30 pm.  Moved to room T614.  No new issues.  5/17: Was sitting up in the bedside chair since change of shift.  Now walking in hallways with RN and CNA.  Interval History:  Past 24 hrs reviewed with RN.    Labs Reviewed, Medications Reviewed, Radiology Reviewed, Wound Reviewed, Fluids Reviewed and GI Nutrition Reviewed    Review of Systems:  Review of Systems   Constitutional: Negative for fever.   Cardiovascular: Negative for chest pain.   Gastrointestinal: Negative for abdominal pain.       Physical Exam:  Physical Exam  Constitutional:       Appearance: Normal appearance.   HENT:      Nose:      Comments: NG  Neck:      Comments: ET  Cardiovascular:      Rate and Rhythm: Normal rate and regular rhythm.   Pulmonary:      Breath sounds: Rhonchi present.      Comments: CT x 1.  Still with bloody drainage in tubing. No air leak noted.  Abdominal:      General: Abdomen is flat.   Skin:     General: Skin is warm and dry.   Neurological:      General: No focal deficit present.      Mental Status: She is alert.         Labs:  Recent Results (from the past 24 hour(s))   Comp Metabolic Panel (CMP) - Every Monday    Collection Time: 05/17/21  3:51 AM   Result Value Ref Range    Sodium 138 135 - 145 mmol/L     Potassium 3.7 3.6 - 5.5 mmol/L    Chloride 103 96 - 112 mmol/L    Co2 25 20 - 33 mmol/L    Anion Gap 10.0 7.0 - 16.0    Glucose 91 65 - 99 mg/dL    Bun 18 8 - 22 mg/dL    Creatinine 0.25 (L) 0.50 - 1.40 mg/dL    Calcium 9.6 8.5 - 10.5 mg/dL    AST(SGOT) 13 12 - 45 U/L    ALT(SGPT) 17 2 - 50 U/L    Alkaline Phosphatase 679 (H) 30 - 99 U/L    Total Bilirubin 0.6 0.1 - 1.5 mg/dL    Albumin 3.2 3.2 - 4.9 g/dL    Total Protein 8.1 6.0 - 8.2 g/dL    Globulin 4.9 (H) 1.9 - 3.5 g/dL    A-G Ratio 0.7 g/dL   CBC WITH DIFFERENTIAL    Collection Time: 05/17/21  3:51 AM   Result Value Ref Range    WBC 7.0 4.8 - 10.8 K/uL    RBC 3.57 (L) 4.20 - 5.40 M/uL    Hemoglobin 10.3 (L) 12.0 - 16.0 g/dL    Hematocrit 34.0 (L) 37.0 - 47.0 %    MCV 95.2 81.4 - 97.8 fL    MCH 28.9 27.0 - 33.0 pg    MCHC 30.3 (L) 33.6 - 35.0 g/dL    RDW 59.0 (H) 35.9 - 50.0 fL    Platelet Count 338 164 - 446 K/uL    MPV 9.1 9.0 - 12.9 fL    Neutrophils-Polys 69.30 44.00 - 72.00 %    Lymphocytes 22.30 22.00 - 41.00 %    Monocytes 7.60 0.00 - 13.40 %    Eosinophils 0.00 0.00 - 6.90 %    Basophils 0.40 0.00 - 1.80 %    Immature Granulocytes 0.40 0.00 - 0.90 %    Nucleated RBC 0.00 /100 WBC    Neutrophils (Absolute) 4.81 2.00 - 7.15 K/uL    Lymphs (Absolute) 1.55 1.00 - 4.80 K/uL    Monos (Absolute) 0.53 0.00 - 0.85 K/uL    Eos (Absolute) 0.00 0.00 - 0.51 K/uL    Baso (Absolute) 0.03 0.00 - 0.12 K/uL    Immature Granulocytes (abs) 0.03 0.00 - 0.11 K/uL    NRBC (Absolute) 0.00 K/uL   MAGNESIUM    Collection Time: 05/17/21  3:51 AM   Result Value Ref Range    Magnesium 1.7 1.5 - 2.5 mg/dL   PHOSPHORUS    Collection Time: 05/17/21  3:51 AM   Result Value Ref Range    Phosphorus 4.4 2.5 - 4.5 mg/dL   Prothrombin Time    Collection Time: 05/17/21  3:51 AM   Result Value Ref Range    PT 15.7 (H) 12.0 - 14.6 sec    INR 1.21 (H) 0.87 - 1.13   ESTIMATED GFR    Collection Time: 05/17/21  3:51 AM   Result Value Ref Range    GFR If African American >60 >60 mL/min/1.73 m 2     GFR If Non African American >60 >60 mL/min/1.73 m 2   COD - Adult (Type and Screen)    Collection Time: 21  3:51 AM   Result Value Ref Range    ABO Grouping Only A     Rh Grouping Only POS     Antibody Screen-Cod NEG    HCG Qualitative Ur    Collection Time: 21  6:06 AM   Result Value Ref Range    Beta-Hcg Urine Negative Negative   SARS-COV Antigen SUHAIL: Collect dry nasal swab AND NP swab in VTM    Collection Time: 21  6:33 AM   Result Value Ref Range    SARS-CoV-2 Source Nasal Swab     SARS-COV ANTIGEN SUHAIL NotDetected Not-Detected   SARS-CoV-2 PCR (24 hour In-House): Collect NP swab in VTM    Collection Time: 21  6:33 AM    Specimen: Nasal; Respirate   Result Value Ref Range    SARS-CoV-2 Source NP Swab      Results     Procedure Component Value Units Date/Time    SARS-CoV-2 PCR (24 hour In-House): Collect NP swab in VTM [[id number]] Collected: 21    Order Status: Completed Specimen: Respirate from Nasal Updated: 21 06     SARS-CoV-2 Source NP Swab    Narrative:      Collected By:[id number] MADDIE SANTOS  Have you been in close contact with a person who is suspected  or known to be positive for COVID-19 within the last 30 days  (e.g. last seen that person < 30 days ago)->Unknown        Hemodynamics:  Temp (24hrs), Av.7 °C (98 °F), Min:36.4 °C (97.5 °F), Max:37.1 °C (98.8 °F)  Temperature: 36.6 °C (97.8 °F)  Pulse  Av.1  Min: 40  Max: 149   Blood Pressure: 115/85    PICC Double Lumen 21 Lumen 1: Purple Lumen 2: Red Right Brachial (Active)   Site Assessment Clean;Dry;Intact 21 08   Lumen 1 Status Blood return noted;Flushed;Normal saline locked;Scrubbed the hub prior to access 21 0800   Lumen 2 Status Blood return noted;Flushed;Normal saline locked;Scrubbed the hub prior to access 21 0800   Line Secured at (cm) 1 cm 21 1118   Extremity Circumference (cm) 30.5 cm 21 1118   Dressing Type Biopatch;Securing device;Skin  barrier;Transparent 05/17/21 0800   Dressing Status Clean;Dry;Intact 05/17/21 0800   Dressing Intervention Dressing changed per protocol 05/15/21 1800   Dressing Change Due 05/22/21 05/17/21 0800   Date Primary Tubing Changed 05/11/21 05/11/21 0800   Date Secondary Tubing Changed 05/11/21 05/11/21 0800   Date IV Connector(s) Changed 05/15/21 05/11/21 0800   NEXT IV Connector(s) Change 05/08/21 05/07/21 1118   Line Necessity Assessed Antibiotic Therapy Greater than 7 Days 05/15/21 0900   $ Double Lumen PICC Charge Single kit used 05/07/21 1118     Wound:  @WOUNDLDA(4)@     Fluids:  Intake/Output                             05/15/21 0700 - 05/16/21 0659 05/16/21 0700 - 05/17/21 0659 05/17/21 0700 - 05/18/21 0659     0700-1859 1208-9649 Total 6635-7462 4776-6196 Total 4829-8861 0703-5651 Total                    Intake    Other  90  60 150  120  90 210  --  -- --    Medications (PO/Enteral Liquids) 90 60 150 120 30 150 -- -- --    Flush / Irrigation Volume (< Enter Name Here >) -- -- -- -- 60 60 -- -- --    NG/GT  315  495 810  540  270 810  --  -- --    Intake (mL) (Enteral Tube 05/11/21 Cortrak - Gastric) 315 495 810 540 270 810 -- -- --    Total Intake 405 555 960  -- -- --       Output    Urine  --  600 600  550  450 1000  --  -- --    Number of Times Voided 2 x -- 2 x -- -- -- -- -- --    Number of Times Incontinent of Urine -- -- -- -- 1 x 1 x -- -- --    Output (mL) (External Urinary Catheter (Female Wick)) -- 600 600  -- -- --    Stool  --  -- --  --  -- --  --  -- --    Number of Times Stooled 1 x -- 1 x -- -- -- -- -- --    Chest Tube  0  20 20  20  -- 20  --  -- --    Output (mL) (Chest Tube 1 Right Midaxillary;Pleural) 0 20 20 20 -- 20 -- -- --    Total Output 0 620 620  -- -- --       Net I/O     405 -65 340 90 -90 0 -- -- --           GI/Nutrition:  Orders Placed This Encounter   Procedures   • Diet NPO     Standing Status:   Standing     Number of Occurrences:   8      Order Specific Question:   Restrict to:     Answer:   Strict [1]     Medications:  Current Facility-Administered Medications   Medication Last Admin   • diazePAM (VALIUM) tablet 5 mg 5 mg at 05/16/21 2206   • busPIRone (BUSPAR) tablet 10 mg 10 mg at 05/17/21 0538   • oxyCODONE immediate release (ROXICODONE) tablet 10 mg 10 mg at 05/17/21 0538   • metoprolol tartrate (LOPRESSOR) tablet 25 mg 25 mg at 05/17/21 0537   • ondansetron (ZOFRAN) syringe/vial injection 4 mg 4 mg at 05/13/21 0431   • acetaminophen (TYLENOL) tablet 500 mg 500 mg at 05/17/21 0813   • oxyCODONE immediate-release (ROXICODONE) tablet 5 mg     • methadone (DOLOPHINE) 10 MG/ML solution 10 mg 10 mg at 05/17/21 0537   • gabapentin (NEURONTIN) capsule 300 mg 300 mg at 05/17/21 0538   • cloNIDine (CATAPRES) tablet 0.1 mg 0.1 mg at 05/17/21 0538   • traMADol (ULTRAM) 50 MG tablet 50 mg 50 mg at 05/15/21 0410   • cefepime (MAXIPIME) 2 g in  mL IVPB Stopped at 05/17/21 0947   • labetalol (NORMODYNE/TRANDATE) injection 10 mg 10 mg at 05/04/21 0226   • [Held by provider] enoxaparin (LOVENOX) inj 40 mg 40 mg at 05/16/21 0509   • Pharmacy Consult: Enteral tube insertion - review meds/change route/product selection     • acetaminophen (Tylenol) tablet 650 mg 650 mg at 05/12/21 1233   • magnesium hydroxide (MILK OF MAGNESIA) suspension 30 mL      And   • senna-docusate (PERICOLACE or SENOKOT S) 8.6-50 MG per tablet 2 tablet 2 tablet at 05/17/21 0537    And   • polyethylene glycol/lytes (MIRALAX) PACKET 1 Packet 1 Packet at 05/11/21 0525    And   • bisacodyl (DULCOLAX) suppository 10 mg     • divalproex (DEPAKOTE SPRINKLE) capsule 500 mg 500 mg at 05/17/21 0537   • DULoxetine (CYMBALTA) capsule 60 mg 60 mg at 05/17/21 0537   • PARoxetine (PAXIL) tablet 10 mg 10 mg at 05/17/21 0537   • risperiDONE (RISPERDAL) tablet 2 mg 2 mg at 05/17/21 0538   • Respiratory Therapy Consult     • ipratropium-albuterol (DUONEB) nebulizer solution       Medical Decision  Making, by Problem:  Active Hospital Problems    Diagnosis    • *Bacteremia due to methicillin susceptible Staphylococcus aureus (MSSA) [R78.81, B95.61]    • Agitation requiring sedation protocol [R45.1]    • 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]    • Acute respiratory failure with hypoxia (HCC) [J96.01]    • Acute bacterial endocarditis [I33.0]    • Empyema of right pleural space (HCC) [J86.9]    • Acute encephalopathy [G93.40]    • Fever [R50.9]    • Pleural effusion, left [J90]    • Atelectasis [J98.11]    • CSF pleocytosis [D72.9]    • Pneumonia [J18.9]    • Tachycardia [R00.0]    • Pseudotumor cerebri [G93.2]    • S/P  shunt [Z98.2]    • Prolonged Q-T interval on ECG [R94.31]    • History of vasculitis [Z86.79]    • History of complicated migraines [G43.109]    • Hyponatremia [E87.1]    • H/O: CVA (cerebrovascular accident) [Z86.73]    • Chronic pain syndrome [G89.4]    • Thrombocytopenia (HCC) [D69.6]    • Anxiety [F41.9]    • Spinal cord stimulator status [Z96.89]    • Goals of care, counseling/discussion [Z71.89]    • Abnormal movement [G25.9]    • Pulmonary abscess (HCC) [J85.2]    • Anasarca [R60.1]    • Thrombocytosis (HCC) [D47.3]    • GERD (gastroesophageal reflux disease) [K21.9]    • Septic shock (HCC) [A41.9, R65.21]    • Anemia [D64.9]    • Hypokalemia [E87.6]      Impression   -Severe sepsis  -Catheter related bloodstream infection due to infected port status post removal 4/12-staph aureus  -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       - Acute left empyema s/p chest tube placement 4/23  -Multiple acute septic pulmonary emboli bilaterally  -Acute bilateral pneumonia due to above     --Pulmonary edema  -Pseudotumor cerebri with underlying  shunt: possible arachnoiditis  -Chronic migraine headaches  -History of autoimmune vasculitis  -Elevated alkaline phosphatase & bilirubin  -Acute  encephalopathy due to sepsis      - anemia due to above      - New secondary Klebsiella pneumoniae pneumonia (presumed VAP)     Plan   - Continue cefepime - clinical endpoint pending.  Will have thoracotomy/decortication this afternoon.   - Monday: surgery   - Would appreciate new cultures tody.  - Has had cefepime since 4/28 to cover Kleb - this should be treated by now   - Will continue through the surgery and if no new organisms assess if we can go back to MSSA coverage alone for the MSSA TV/MV endocarditis.  - Currently off the vent.  - Tolerated the first decortication. Cx NGTD.   - No IV abx changes at this time.   - Continue to monitor closely  - Improving overall.   - Current abx covering CNS and back hardware.    - This HW is not coming out anytime soon per NS.    - Dispo:         - To have further thoracic surgery this afternoon.  Hope to complete the decortication/thoracotomy.  Has been weaned off vent.  Has only one CT left, but still with drainage.   Continue current antibiotics pending new cultures.  If no further GN to merit Cefepime, than perhaps can go back to just MSSA coverage for the MV/TV endocardiditis associated with line infection.    Case and treatment reviewed with patient(best possible) and RN    > 30 min on floor in ICU with > 50% face to face view and 100% in direct patient care/counseling today.

## 2021-05-17 NOTE — CARE PLAN
Problem: Ventilation Defect:  Goal: Ability to achieve and maintain unassisted ventilation or tolerate decreased levels of ventilator support  Outcome: Met     Problem: Oxygenation:  Goal: Maintain adequate oxygenation dependent on patient condition  Outcome: Progressing  Trach day 17  8.0 Portex Cuffed, deflated  Patient receiving 4L 28% via T-Piece

## 2021-05-17 NOTE — PROGRESS NOTES
Hospital Medicine Daily Progress Note    Date of Service  5/17/2021    Chief Complaint  50 y.o. female admitted 4/16/2021 with empyema with MSSA, complicated by respiratory failure and thoracic surgery consultation, transferred from an outlying facility namely [facility]    Hospital Course  This is a 50-year-old female with a past medical history of pseudotumor cerebri s/p  shunt implantation by Dr. Oh neurosurgery, chronic pain syndrome with a history of placement of a nerve stimulator, vasculitis, right anterior chest port for home IV medication administration, recurrent infections related to port, presented on 4/11 to [facility] with recurrent port infection was initially treated with vancomycin and Zosyn and then changed to ceftaroline subsequently switched to nafcillin, MSSA identified.  Serum infectious disease is managing antibiotics for the patient.  Dr. Candelaria surgery removed the port on 4/12.  The patient was further identified to have endocarditis of the tricuspid valve, a lumbar puncture performed on 4/14 showed pleocytosis with negative Gram stain.  The patient suffered worsening leukocytosis and was found to have septic emboli per CT.  Initially a small pleural catheter was placed.  Significant loculations were encountered.  MSSA empyema was diagnosed.  Neurosurgery was consulted to comment on possibly removal of a  shunt, this was felt not appropriate at this time.  The patient remained on mechanical ventilation for 15 days, then a perc trach was placed.  The patient was eventually liberated from mechanical ventilation, the patient did have a right thoracoscopy and decortication of the lung performed on 4/28 by Dr. Ganser.  The patient was of identified to have multiple sources for MSSA including mitral valve, tricuspid valve vegetation, port, spinal stimulator,  shunt possibly.  The patient is slowly improving, she is significant chronic pain  issues, it was determined that her loculated pleural fluid is returning and the patient will need more extensive thoracotomy/pleurodesis on 5/17 with Dr. Ganser.  The patient was referred to LTAC pending additional surgical interventions to be completed    Interval Problem Update  Patient seen and examined today.    Patient tolerating treatment and therapies.  All Data, Medication data reviewed.  Case discussed with nursing as available.  Plan of Care reviewed with patient and notified of changes.  5/13 the patient appears significantly lethargic, possible medication effects, she is able to awaken and respond some, she is complaining of back pain, afebrile, heart rate in the 1 teens to 130s, respiration in the 15 range unlabored, the patient is on 7 L T-piece, 96%, blood pressure 108/64, improving anemia hemoglobin 9.8, chemistry fairly unremarkable except for alk phos of 741 which is improving,  5/14 the patient states that she has much anxiety, less so pain today, she was informed about the surgical plans for Monday, medication adjusted, still appears very lethargic and debilitated and weak  5/15 the patient continues to be lethargic, complains still of anxiety, restlessness and poor sleep, a chest tube was accidentally pulled out last evening, chest x-ray remained unchanged, and the area was secured with a occlusive dressing, this morning the patient removed/pulled her core track, reinserted, low potassium noted, ongoing lethargy, chronic illness and debility.  Awaiting additional surgery on Monday.  5/16 Enedelia continues to appear drowsy, some anxiety, mild tremor, no respiratory difficulty, appears adequately medicated for pain, awaiting surgery tomorrow, chemistry reviewed, medication regimen reviewed  5/17 the patient remains lethargic, requiring multiple medications to control pain and anxiety, the patient is scheduled for repeat chest surgery later this afternoon.  Laboratory data reviewed and appears  appropriate for surgery, intensivist aware of the patient's possible return on mechanical ventilation  Consultants/Specialty  Critical care/pulmonary  Infectious disease  Thoracic surgery  Palliative care    Code Status  Full Code    Disposition  Will likely postacute treatment at an LTAC level    Review of Systems  Review of Systems   Unable to perform ROS: Mental acuity        Physical Exam  Temp:  [36.4 °C (97.5 °F)-37.1 °C (98.8 °F)] 36.6 °C (97.8 °F)  Pulse:  [] 101  Resp:  [17-24] 17  BP: (115-139)/(57-85) 115/85  SpO2:  [94 %-98 %] 94 %    Physical Exam  Vitals and nursing note reviewed.   Constitutional:       Appearance: She is well-developed and normal weight. She is ill-appearing. She is not diaphoretic.      Interventions: Cervical collar in place.      Comments: Lethargic, chronically ill-appearing female, looks much older than stated age   HENT:      Head: Normocephalic and atraumatic.      Nose: Nose normal.      Comments: Core track     Mouth/Throat:      Mouth: Mucous membranes are dry.   Eyes:      Conjunctiva/sclera: Conjunctivae normal.      Pupils: Pupils are equal, round, and reactive to light.   Neck:      Thyroid: No thyromegaly.      Vascular: No JVD.      Comments: Tracheostomy  Cardiovascular:      Rate and Rhythm: Normal rate and regular rhythm.      Heart sounds: Normal heart sounds. No friction rub. No gallop.    Pulmonary:      Effort: Pulmonary effort is normal.      Breath sounds: Rhonchi and rales present. No wheezing.      Comments: Chest tube in place with serosanguineous fluid  Abdominal:      General: Bowel sounds are normal. There is no distension.      Palpations: Abdomen is soft. There is no mass.      Tenderness: There is no abdominal tenderness. There is no guarding or rebound.   Musculoskeletal:         General: No tenderness. Normal range of motion.      Cervical back: Normal range of motion and neck supple.   Lymphadenopathy:      Cervical: No cervical adenopathy.    Skin:     General: Skin is warm and dry.   Neurological:      General: No focal deficit present.      Mental Status: She is lethargic and disoriented.      Cranial Nerves: No cranial nerve deficit.      Motor: Weakness present.         Fluids    Intake/Output Summary (Last 24 hours) at 5/17/2021 1452  Last data filed at 5/17/2021 0600  Gross per 24 hour   Intake 690 ml   Output 1020 ml   Net -330 ml       Laboratory  Recent Labs     05/15/21  0229 05/17/21  0351   WBC 6.9 7.0   RBC 2.99* 3.57*   HEMOGLOBIN 8.8* 10.3*   HEMATOCRIT 28.3* 34.0*   MCV 94.6 95.2   MCH 29.4 28.9   MCHC 31.1* 30.3*   RDW 57.2* 59.0*   PLATELETCT 306 338   MPV 9.1 9.1     Recent Labs     05/15/21  0229 05/16/21  0330 05/17/21  0351   SODIUM 139 139 138   POTASSIUM 3.3* 4.0 3.7   CHLORIDE 105 103 103   CO2 27 28 25   GLUCOSE 83 110* 91   BUN 14 21 18   CREATININE <0.17* 0.22* 0.25*   CALCIUM 8.3* 9.6 9.6     Recent Labs     05/17/21  0351   INR 1.21*               Imaging  DX-CHEST-PORTABLE (1 VIEW)   Final Result         1.  Hazy right pulmonary infiltrates and/or effusion, similar compared to prior study.      DX-ABDOMEN FOR TUBE PLACEMENT   Final Result      Feeding tube tip projects over the expected location the gastric antrum.      DX-CHEST-PORTABLE (1 VIEW)   Final Result         1.  Hazy right pulmonary infiltrates and/or effusion, similar compared to prior study.      DX-CHEST-LIMITED (1 VIEW)   Final Result      1.  No pneumothorax. Only one chest tube is now seen in the right lung.   2.  The chest is otherwise stable.      DX-CHEST-PORTABLE (1 VIEW)   Final Result         1.  Hazy right pulmonary infiltrates and/or effusion, similar compared to prior study.      DX-CHEST-PORTABLE (1 VIEW)   Final Result         1.  Hazy right pulmonary infiltrates and/or effusion, similar compared to prior study.      DX-ABDOMEN FOR TUBE PLACEMENT   Final Result         1.  Nonspecific bowel gas pattern.   2.  Dobbhoff tube tip terminates  overlying the expected location of the gastric antrum.   3.  Hazy bilateral lung base infiltrates, greater on the right.      DX-CHEST-PORTABLE (1 VIEW)   Final Result         1.  Hazy right pulmonary infiltrates and/or effusion, similar compared to prior study.      US-RUQ   Final Result         1.  Hepatomegaly   2.  Mild intrahepatic and extrahepatic biliary ductal dilatation, commonly associated with postcholecystectomy physiology, consider causes of biliary obstruction with additional workup as clinically appropriate      CT-CHEST,ABDOMEN,PELVIS WITH   Final Result      1.  Large empyema in the RIGHT hemithorax, slightly decreased in size from prior exam with chest tubes present.   2.  Consolidation remains.   3.  Multiple cavitary lesions again seen in the LEFT upper lobe, minimally decreased from prior, concerning for septic emboli.   4.  Supportive tubing is unchanged.   5.  Intrahepatic and extrahepatic biliary dilation, likely postoperative although distal stricture, stone or mass remain possibilities.   6.  Moderate pelvic fluid, nonspecific.   7.  No evidence of bowel obstruction or perforation.      DX-CHEST-PORTABLE (1 VIEW)   Final Result         1.  Hazy right pulmonary infiltrates and/or effusion, similar compared to prior study.      DX-CHEST-PORTABLE (1 VIEW)   Final Result         No significant interval change.            DX-CHEST-PORTABLE (1 VIEW)   Final Result         Interval removal of left central venous catheter. Interval adjustment of right PICC with tip in the SVC.         DX-ABDOMEN FOR TUBE PLACEMENT   Final Result      Feeding tube tip at the mid to distal stomach.      IR-PICC LINE PLACEMENT W/ GUIDANCE > AGE 5   Final Result                  Ultrasound-guided PICC placement performed by qualified nursing staff as    above.          DX-CHEST-PORTABLE (1 VIEW)   Final Result      1.  Stable cardiopulmonary findings.   2.  See comments above regarding the right-sided PICC position.       DX-CHEST-PORTABLE (1 VIEW)   Final Result      Stable chest findings compared to 5/5.      DX-CHEST-PORTABLE (1 VIEW)   Final Result      Stable chest findings compared with 5/3.      DX-ABDOMEN FOR TUBE PLACEMENT   Final Result         Feeding tube with tip projecting over the expected area of the stomach.      DX-CHEST-PORTABLE (1 VIEW)   Final Result      Stable chest findings compared with prior.      DX-CHEST-PORTABLE (1 VIEW)   Final Result         1. No significant interval change.      US-EXTREMITY ARTERY LOWER UNILAT RIGHT   Final Result      DX-CHEST-PORTABLE (1 VIEW)   Final Result         1. No significant interval change.      IR-PICC LINE PLACEMENT W/ GUIDANCE > AGE 5   Final Result                  Ultrasound-guided PICC placement performed by qualified nursing staff as    above.          DX-CHEST-PORTABLE (1 VIEW)   Final Result         1.  Pulmonary edema and/or infiltrates, similar to prior study.   2.  Stable opacification right lung, likely effusion. Thoracostomy tubes are in place.   3.  Multiple cavitary appearing left pulmonary lesions, see CT performed April 27, 2021 for further characterization      DX-CHEST-PORTABLE (1 VIEW)   Final Result         1.  Pulmonary edema and/or infiltrates, similar to prior study.   2.  Single opacification right lung, likely effusion. Thoracostomy tubes are in place.   3.  Multiple cavitary appearing left pulmonary lesions, see CT performed April 27, 2021 for further characterization   4.  Soft tissue gas in the right chest wall      DX-CHEST-PORTABLE (1 VIEW)   Final Result         1.  Pulmonary edema and/or infiltrates, similar to prior study.   2.  Increased opacification right lung, likely increased effusion. Thoracostomy tubes are in place.   3.  Multiple cavitary appearing left pulmonary lesions, see CT performed April 27, 2021 for further characterization   4.  Soft tissue gas in the right chest wall      DX-CHEST-PORTABLE (1 VIEW)   Final Result          1.  Pulmonary edema and/or infiltrates, similar to prior study.   2.  Right pneumothorax, stable compared to prior study, right thoracostomy tubes remain in place   3.  Multiple cavitary appearing left pulmonary lesions, see CT performed April 27, 2021 for further characterization   4.  Soft tissue gas in the right chest wall      DX-CHEST-PORTABLE (1 VIEW)   Final Result         1.  Pulmonary edema and/or infiltrates, similar to prior study.   2.  Right pneumothorax, interval decreased compared to prior study, interval placement of right thoracostomy tubes is noted.   3.  Multiple cavitary appearing left pulmonary lesions, see CT performed yesterday for further characterization   4.  Soft tissue gas in the right chest wall      DX-CHEST-PORTABLE (1 VIEW)   Final Result         1.  Pulmonary edema and/or infiltrates, similar to prior study.   2.  Right pneumothorax, stable compared to prior study, interval removal of right thoracostomy tube is noted.   3.  Multiple cavitary appearing left pulmonary lesions, see CT performed yesterday for further characterization   4.  Soft tissue gas in the right chest wall      DX-CHEST-LIMITED (1 VIEW)   Final Result         No significant interval change.      CT-CTA CHEST PULMONARY ARTERY W/ RECONS   Final Result         No CT evidence of pulmonary embolus.      Previous right chest tube were removed.      Grossly similar in size of the loculated right hydropneumothorax but there is increased layering fluid component. Unchanged mild shift of the mediastinal to the left.      Redemonstration of a pigtail catheter in the medial left lung base. Grossly similar multiple cavitary lesions in the left lung.      US-EXTREMITY VENOUS LOWER BILAT   Final Result      POCT BUTTERFLY-DUPLEX SCAN EXTREMITY VEINS LIMITED   Final Result      DX-CHEST-PORTABLE (1 VIEW)   Final Result         1.  Pulmonary edema and/or infiltrates, similar to prior study.   2.  Right pneumothorax, somewhat  decreased to prior study, interval removal of right thoracostomy tube is noted.   3.  Multiple cavitary appearing left pulmonary lesions, see CT performed yesterday for further characterization   4.  Soft tissue gas in the right chest wall      DX-CHEST-PORTABLE (1 VIEW)   Final Result         1.  Pulmonary edema and/or infiltrates, similar to prior study.   2.  Right pneumothorax, similar to prior study with thoracostomy tube in place.   3.  Multiple cavitary appearing left pulmonary lesions, see CT performed yesterday for further characterization      CT-CHEST (THORAX) W/O   Final Result      Large loculated right hydropneumothorax with interval increase in size of the pneumothorax and pleural fluid.      Right chest tube is in the posterior right thorax.      There is mediastinal shift to the left compatible with tension.      Small pericardial effusion.      Small loculated left pleural effusion with overlying atelectasis/consolidation.   Pigtail catheter is seen at the medial left lung base. Pleural effusion has decreased in size compared to prior.      There are multiple foci of air extending from the right lung to the pleural space suspicious for a bronchopleural fistula.      Multiple cavitary lesions bilaterally with mild interval decrease of the solid component of the cavitary nodules.      Noncavitary 3.1 cm masslike density is seen at the left lung base.      Findings may be related to septic emboli, malignancy or multifocal abscesses. Short interval follow-up recommended.      Soft tissue air on the right is again seen.      Splenomegaly      Findings discussed with Leti Sun.      DX-CHEST-PORTABLE (1 VIEW)   Final Result      No significant change from prior exam.      CT-HEAD W/O   Final Result      1.  RIGHT frontal ventriculostomy catheter unchanged in position.   2.  Mild cortical atrophy.   3.  No intracranial hemorrhage.   4.  Apparent dural thickening overlying the clivus.  Consider further  evaluation with contrast-enhanced MRI.      DX-CHEST-LIMITED (1 VIEW)   Final Result      Interval left basilar pigtail catheter with diminished atelectasis, pleural fluid      Stable right hydropneumothorax with thoracostomy tube in place, considerable soft tissue gas and partial lung collapse      IR-CHEST TUBE-EMPYEMA LEFT   Final Result      1.  CT GUIDED LEFT  10 Kinyarwanda PIGTAIL CHEST TUBE PLACEMENT FOR POSSIBLE EMPYEMA YIELDING OLD BLOODY FLUID.      2. A CHEST RADIOGRAPH IS FORTHCOMING.      EC-MICAH W/O CONT   Final Result      US-ABDOMEN LTD (SOFT TISSUE)   Final Result      No fluid seen surrounding the electronic implanted spinal stimulator device.      DX-CHEST-LIMITED (1 VIEW)   Final Result      1.  Large right hydropneumothorax with right-sided chest tube in place, likely stable to slightly decreased from prior study.   2.  Small left pleural effusion. Mild improved aeration in the left lung.   3.  Bilateral pulmonary opacities which could be related to combination of atelectasis, edema and/or infection..      CT-CTA HEAD WITH & W/O-POST PROCESS   Final Result      1.  Patent carotid and vertebral arteries.      2.  Again seen right frontal the jugular peritoneal shunt catheter. There is mild increase in volume of the lateral and third ventricles.      CT-CHEST (THORAX) W/O   Final Result      1.  Interval placement of a right-sided chest tube into a large loculated right-sided pleural effusion. There is now seen a large right-sided hydropneumothorax in the area that previously seen fluid. The chest tube extends into that hydropneumothorax.    There is some residual pleural fluid seen as well. A hydropneumothorax is somewhat loculated with components most prominently inferiorly and medially.      2.  New loculated left pleural effusion.      3.  Again seen multiple bilateral cavitary pulmonary masses which are more prominent than previously seen with increasing cavitation and measure up to 3 cm size.  Differential diagnosis includes septic emboli, multifocal abscesses and neoplasm.      4.  Large amount of subcutaneous emphysema on the right.      5.  Splenomegaly.      6.  Multiple support devices.      Fleischner Society pulmonary nodule recommendations:         DX-CHEST-LIMITED (1 VIEW)   Final Result      1.  Support devices as described above.      2.  Right chest tube present with a again seen right-sided pneumothorax, possibly increased in size and loculated.      3.  Worsening bilateral pulmonary infiltrates.      DX-ABDOMEN FOR TUBE PLACEMENT   Final Result      Cortrak feeding tube tip projects in the region of the duodenal bulb.      DX-CHEST-LIMITED (1 VIEW)   Final Result      Loculated right pneumothorax is decreased in size compared to prior.      Small left pleural effusion.      Small loculated right pleural effusion.      Extensive bilateral airspace opacities are not significantly changed.      Soft tissue air on the right is again noted.      DX-CHEST-LIMITED (1 VIEW)   Final Result      Decreased partially loculated right pleural fluid with increased pleural air. Right chest tube is in place.      Increased hazy opacity in the left lung possibly atelectasis.         US-ABDOMEN LTD (SOFT TISSUE)   Final Result      No drainable fluid collection.      DX-CHEST-PORTABLE (1 VIEW)   Final Result      Decreased partially loculated right pleural fluid with increased pleural air. Right chest tube is in place.      No other significant change.      DX-CHEST-PORTABLE (1 VIEW)   Final Result         1.  Pulmonary edema and/or infiltrates are identified, which are somewhat decreased since the prior exam.   2.  Moderate loculated appearing right pleural effusion, slightly decreased since prior      DX-ABDOMEN FOR TUBE PLACEMENT   Final Result      Feeding tube tip at the distal stomach.      DX-CHEST-PORTABLE (1 VIEW)   Final Result         1.  New chest tube is noted in the right lower hemithorax.       2.  Right pleural effusion is again identified which appears similar to the prior exam.      3.  No new infiltrates or consolidations are identified.      DX-CHEST-PORTABLE (1 VIEW)   Final Result         1.  Pulmonary edema and/or infiltrates are identified, which are stable since the prior exam.   2.  Moderate loculated appearing right pleural effusion      DX-CHEST-PORTABLE (1 VIEW)   Final Result         No significant interval change.      POCT BUTTERFLY-ECHOCARDIOGRAM LTD W/O CONT    (Results Pending)        Assessment/Plan  * Bacteremia due to methicillin susceptible Staphylococcus aureus (MSSA)- (present on admission)  Assessment & Plan  Infectious disease assisting with antibiotic treatment  Multiple potential sources  Endocarditis  Need to achieve source control    Debility  Assessment & Plan  Acute on chronic, the patient likely need of LTAC treatment post acute treatment    Elevated alkaline phosphatase level- (present on admission)  Assessment & Plan  The patient not able to tolerate a MRCP at this time, ultrasound grossly unremarkable, observe    Agitation requiring sedation protocol- (present on admission)  Assessment & Plan  Monitor,    Goals of care, counseling/discussion  Assessment & Plan  Patient with overall high risk of morbidity and mortality given her gravely ill state and prior chronic medical conditions  Palliative care assisting    Spinal cord stimulator status  Assessment & Plan  Patient's stimulator is Nuvectra. It is FDA approved as MRI compatible, but the company has gone out of business, so there is no support team to assist with MRI.  Thus, if MRI were performed, our radiologists would need to protocol it correctly to manage the stimulator. The former rep from [employer] is Andre, [phone].  Information obtained from Dr. Mahoney's office, [phone].     Per  (5/4/2021), it is not MRI compatible unfortunately.     Anasarca  Assessment & Plan  Improved nutritional status,  skin protective measures    Trapped lung  Assessment & Plan  With need for further thoracotomy, decortication    Pulmonary abscess (HCC)  Assessment & Plan  From septic emboli, antibiotic treatment, CT follow-up    Abnormal movement  Assessment & Plan  Apparently tardive dyskinesia, avoid offending agents    Fever  Assessment & Plan  Resolved, monitor, antibiotic therapy    Atelectasis  Assessment & Plan  Pulmonary recruiting, respiratory care    Pneumonia  Assessment & Plan  MSSA, ongoing treatment    History of vasculitis- (present on admission)  Assessment & Plan  Does not appear to be a current issue    CSF pleocytosis- (present on admission)  Assessment & Plan  Infectious disease managing    Empyema of right pleural space (HCC)- (present on admission)  Assessment & Plan  Patient s/p multiple chest tubes, thoracotomy and decortication  With recurrence unfortunately  Will need additional thoracic surgery with Dr. Ganser on 5/17    Acute bacterial endocarditis- (present on admission)  Assessment & Plan  Mitral valve, tricuspid valve, ongoing antibiotic therapy, source control    Acute blood loss anemia  Assessment & Plan  Likely secondary to hemothorax  300 cc of blood removed after chest tube placed at [facility]  Trend hemoglobin    Prolonged Q-T interval on ECG- (present on admission)  Assessment & Plan  Avoid medication affecting QTC, monitor    Acute respiratory failure with hypoxia (HCC)- (present on admission)  Assessment & Plan  Secondary to MSSA bacteremia, empyema, pulmonary septic emboli  S/p tracheostomy  Ongoing respiratory care, pulmonary toilet, antibiotic treatment  Prolonged ventilator dependence    History of complicated migraines- (present on admission)  Assessment & Plan  Monitor    GERD (gastroesophageal reflux disease)- (present on admission)  Assessment & Plan  History of, as needed treatment    Hyponatremia  Assessment & Plan  Monitor    Tachycardia  Assessment & Plan  Persistent,  likely secondary with multiple underlying ongoing conditions  Monitor    Pseudotumor cerebri  Assessment & Plan  History of  shunt, neurosurgery opted against removal    H/O: CVA (cerebrovascular accident)- (present on admission)  Assessment & Plan  Monitor    Chronic pain syndrome- (present on admission)  Assessment & Plan  Pre-existing, pain management    Thrombocytopenia (HCC)- (present on admission)  Assessment & Plan  Transient, resolved    Anemia  Assessment & Plan  Monitor, transfuse as indicated    Septic shock (HCC)- (present on admission)  Assessment & Plan  Multiple sources, recovered with aggressive treatment, IV antibiotics long-term    Sepsis (HCC)  Assessment & Plan  Multiple sources possible, recovered with medical treatment    Hypomagnesemia  Assessment & Plan  Monitor, replace and recheck    Hypokalemia  Assessment & Plan  Monitor, replace and recheck    Anxiety- (present on admission)  Assessment & Plan  Likely acute on chronic, monitor    S/P  shunt- (present on admission)  Assessment & Plan  History of, Dr. Oh felt there is currently no need to remove    Acute encephalopathy- (present on admission)  Assessment & Plan  Per neurosurgery no need to remove  shunt  Improving with ongoing medical treatment    Plan  Await surgery outcome  Continue antibiotic therapy with the help of infectious disease  Continue prior psychiatric medication regimen  Pain control, multimodality  Will reduce scheduled narcotic medication  Continue anxiety control with BuSpar  Continue with rehab efforts  See orders  Medically complex high risk patient  Overall appears prognostically poor given her widespread infection, debility, lack of infection source control at this time    VTE prophylaxis: Lovenox    I have performed a physical exam and reviewed and updated ROS and Plan today . In review of yesterday's note , there are no changes except as documented above.        Please note that this dictation was created  using voice recognition software. I have made every reasonable attempt to correct obvious errors, but I expect that there are errors of grammar and possibly context that I did not discover before finalizing the note.

## 2021-05-17 NOTE — ANESTHESIA PREPROCEDURE EVALUATION
Relevant Problems   PULMONARY   (positive) History of surgical site infection   (positive) Pneumonia      NEURO   (positive) H/O: CVA (cerebrovascular accident)   (positive) History of surgical site infection   (positive) History of vasculitis   (positive) Seizures (HCC)      CARDIAC   (positive) Arteritis, unspecified (HCC)   (positive) History of complicated migraines   (positive) Intractable migraine with aura with status migrainosus   (positive) Peripheral venous insufficiency      GI   (positive) GERD (gastroesophageal reflux disease)      Other   (positive) Rheumatoid arthritis(714.0)       Physical Exam    Airway - unable to assess  TM distance: >3 FB  Neck ROM: full  Patient is intubated/trached     Cardiovascular - normal exam  Rhythm: regular  Rate: normal     Dental - normal exam           Pulmonary    Abdominal - normal exam     Neurological - normal exam                 Anesthesia Plan    ASA 2       Plan - general       Airway plan will be ETT        Plan Factors:   Patient was not previously instructed to abstain from smoking on day of procedure.  Patient did not smoke on day of procedure.      Induction: intravenous          Informed Consent:    Anesthetic plan and risks discussed with patient.    Use of blood products discussed with: patient whom.

## 2021-05-18 ENCOUNTER — APPOINTMENT (OUTPATIENT)
Dept: RADIOLOGY | Facility: MEDICAL CENTER | Age: 51
DRG: 003 | End: 2021-05-18
Attending: HOSPITALIST
Payer: MEDICARE

## 2021-05-18 ENCOUNTER — APPOINTMENT (OUTPATIENT)
Dept: RADIOLOGY | Facility: MEDICAL CENTER | Age: 51
DRG: 003 | End: 2021-05-18
Attending: INTERNAL MEDICINE
Payer: MEDICARE

## 2021-05-18 PROBLEM — R25.9 ABNORMAL MOVEMENT: Status: RESOLVED | Noted: 2021-04-25 | Resolved: 2021-05-18

## 2021-05-18 LAB
ANION GAP SERPL CALC-SCNC: 10 MMOL/L (ref 7–16)
ANION GAP SERPL CALC-SCNC: 11 MMOL/L (ref 7–16)
BASE EXCESS BLDA CALC-SCNC: -2 MMOL/L (ref -4–3)
BODY TEMPERATURE: ABNORMAL DEGREES
BUN SERPL-MCNC: 18 MG/DL (ref 8–22)
BUN SERPL-MCNC: 21 MG/DL (ref 8–22)
CA-I SERPL-SCNC: 1.2 MMOL/L (ref 1.1–1.3)
CALCIUM SERPL-MCNC: 9 MG/DL (ref 8.5–10.5)
CALCIUM SERPL-MCNC: 9.3 MG/DL (ref 8.5–10.5)
CHLORIDE SERPL-SCNC: 103 MMOL/L (ref 96–112)
CHLORIDE SERPL-SCNC: 105 MMOL/L (ref 96–112)
CO2 BLDA-SCNC: 24 MMOL/L (ref 20–33)
CO2 SERPL-SCNC: 24 MMOL/L (ref 20–33)
CO2 SERPL-SCNC: 24 MMOL/L (ref 20–33)
CREAT SERPL-MCNC: 0.24 MG/DL (ref 0.5–1.4)
CREAT SERPL-MCNC: 0.33 MG/DL (ref 0.5–1.4)
DELSYS IDSYS: ABNORMAL
ERYTHROCYTE [DISTWIDTH] IN BLOOD BY AUTOMATED COUNT: 61.1 FL (ref 35.9–50)
ERYTHROCYTE [DISTWIDTH] IN BLOOD BY AUTOMATED COUNT: 61.4 FL (ref 35.9–50)
GLUCOSE SERPL-MCNC: 108 MG/DL (ref 65–99)
GLUCOSE SERPL-MCNC: 119 MG/DL (ref 65–99)
HCO3 BLDA-SCNC: 23 MMOL/L (ref 17–25)
HCT VFR BLD AUTO: 29.7 % (ref 37–47)
HCT VFR BLD AUTO: 33.3 % (ref 37–47)
HGB BLD-MCNC: 10.2 G/DL (ref 12–16)
HGB BLD-MCNC: 9.1 G/DL (ref 12–16)
HOROWITZ INDEX BLDA+IHG-RTO: 145 MM[HG]
LPM ILPM: 10 LPM
MAGNESIUM SERPL-MCNC: 1.6 MG/DL (ref 1.5–2.5)
MAGNESIUM SERPL-MCNC: 1.7 MG/DL (ref 1.5–2.5)
MCH RBC QN AUTO: 28.7 PG (ref 27–33)
MCH RBC QN AUTO: 29.2 PG (ref 27–33)
MCHC RBC AUTO-ENTMCNC: 30.6 G/DL (ref 33.6–35)
MCHC RBC AUTO-ENTMCNC: 30.6 G/DL (ref 33.6–35)
MCV RBC AUTO: 93.5 FL (ref 81.4–97.8)
MCV RBC AUTO: 95.2 FL (ref 81.4–97.8)
O2/TOTAL GAS SETTING VFR VENT: 40 %
PATHOLOGY CONSULT NOTE: NORMAL
PCO2 BLDA: 36.8 MMHG (ref 26–37)
PCO2 TEMP ADJ BLDA: 36.8 MMHG (ref 26–37)
PH BLDA: 7.4 [PH] (ref 7.4–7.5)
PH TEMP ADJ BLDA: 7.4 [PH] (ref 7.4–7.5)
PHOSPHATE SERPL-MCNC: 4.4 MG/DL (ref 2.5–4.5)
PHOSPHATE SERPL-MCNC: 5.5 MG/DL (ref 2.5–4.5)
PLATELET # BLD AUTO: 335 K/UL (ref 164–446)
PLATELET # BLD AUTO: 404 K/UL (ref 164–446)
PMV BLD AUTO: 9.2 FL (ref 9–12.9)
PMV BLD AUTO: 9.5 FL (ref 9–12.9)
PO2 BLDA: 58 MMHG (ref 64–87)
PO2 TEMP ADJ BLDA: 58 MMHG (ref 64–87)
POTASSIUM SERPL-SCNC: 3.7 MMOL/L (ref 3.6–5.5)
POTASSIUM SERPL-SCNC: 4.2 MMOL/L (ref 3.6–5.5)
RBC # BLD AUTO: 3.12 M/UL (ref 4.2–5.4)
RBC # BLD AUTO: 3.56 M/UL (ref 4.2–5.4)
SAO2 % BLDA: 90 % (ref 93–99)
SODIUM SERPL-SCNC: 138 MMOL/L (ref 135–145)
SODIUM SERPL-SCNC: 139 MMOL/L (ref 135–145)
SPECIMEN DRAWN FROM PATIENT: ABNORMAL
WBC # BLD AUTO: 14.8 K/UL (ref 4.8–10.8)
WBC # BLD AUTO: 15.1 K/UL (ref 4.8–10.8)

## 2021-05-18 PROCEDURE — 700111 HCHG RX REV CODE 636 W/ 250 OVERRIDE (IP): Performed by: INTERNAL MEDICINE

## 2021-05-18 PROCEDURE — 36600 WITHDRAWAL OF ARTERIAL BLOOD: CPT

## 2021-05-18 PROCEDURE — 83735 ASSAY OF MAGNESIUM: CPT

## 2021-05-18 PROCEDURE — 700102 HCHG RX REV CODE 250 W/ 637 OVERRIDE(OP): Performed by: HOSPITALIST

## 2021-05-18 PROCEDURE — 700105 HCHG RX REV CODE 258: Performed by: INTERNAL MEDICINE

## 2021-05-18 PROCEDURE — A9270 NON-COVERED ITEM OR SERVICE: HCPCS | Performed by: INTERNAL MEDICINE

## 2021-05-18 PROCEDURE — A9270 NON-COVERED ITEM OR SERVICE: HCPCS | Performed by: HOSPITALIST

## 2021-05-18 PROCEDURE — 700102 HCHG RX REV CODE 250 W/ 637 OVERRIDE(OP): Performed by: INTERNAL MEDICINE

## 2021-05-18 PROCEDURE — 99233 SBSQ HOSP IP/OBS HIGH 50: CPT | Performed by: HOSPITALIST

## 2021-05-18 PROCEDURE — 770022 HCHG ROOM/CARE - ICU (200)

## 2021-05-18 PROCEDURE — 94640 AIRWAY INHALATION TREATMENT: CPT

## 2021-05-18 PROCEDURE — 80048 BASIC METABOLIC PNL TOTAL CA: CPT

## 2021-05-18 PROCEDURE — 82803 BLOOD GASES ANY COMBINATION: CPT

## 2021-05-18 PROCEDURE — 82330 ASSAY OF CALCIUM: CPT

## 2021-05-18 PROCEDURE — 85027 COMPLETE CBC AUTOMATED: CPT

## 2021-05-18 PROCEDURE — 84100 ASSAY OF PHOSPHORUS: CPT

## 2021-05-18 PROCEDURE — 99233 SBSQ HOSP IP/OBS HIGH 50: CPT | Performed by: INTERNAL MEDICINE

## 2021-05-18 PROCEDURE — 71045 X-RAY EXAM CHEST 1 VIEW: CPT

## 2021-05-18 RX ORDER — CEFAZOLIN SODIUM 2 G/100ML
2 INJECTION, SOLUTION INTRAVENOUS EVERY 8 HOURS
Status: DISCONTINUED | OUTPATIENT
Start: 2021-05-18 | End: 2021-05-19

## 2021-05-18 RX ORDER — DIAZEPAM 5 MG/1
2.5 TABLET ORAL ONCE
Status: COMPLETED | OUTPATIENT
Start: 2021-05-18 | End: 2021-05-18

## 2021-05-18 RX ORDER — SODIUM CHLORIDE, SODIUM LACTATE, POTASSIUM CHLORIDE, AND CALCIUM CHLORIDE .6; .31; .03; .02 G/100ML; G/100ML; G/100ML; G/100ML
1000 INJECTION, SOLUTION INTRAVENOUS ONCE
Status: COMPLETED | OUTPATIENT
Start: 2021-05-18 | End: 2021-05-18

## 2021-05-18 RX ORDER — GABAPENTIN 300 MG/1
300 CAPSULE ORAL EVERY 8 HOURS
Status: DISCONTINUED | OUTPATIENT
Start: 2021-05-18 | End: 2021-05-28

## 2021-05-18 RX ORDER — MAGNESIUM SULFATE HEPTAHYDRATE 40 MG/ML
4 INJECTION, SOLUTION INTRAVENOUS ONCE
Status: COMPLETED | OUTPATIENT
Start: 2021-05-18 | End: 2021-05-18

## 2021-05-18 RX ADMIN — HYDROMORPHONE HYDROCHLORIDE 2 MG: 2 INJECTION, SOLUTION INTRAMUSCULAR; INTRAVENOUS; SUBCUTANEOUS at 05:17

## 2021-05-18 RX ADMIN — DIAZEPAM 5 MG: 5 TABLET ORAL at 22:34

## 2021-05-18 RX ADMIN — OXYCODONE 5 MG: 5 TABLET ORAL at 19:54

## 2021-05-18 RX ADMIN — SODIUM CHLORIDE, POTASSIUM CHLORIDE, SODIUM LACTATE AND CALCIUM CHLORIDE 1000 ML: 600; 310; 30; 20 INJECTION, SOLUTION INTRAVENOUS at 12:30

## 2021-05-18 RX ADMIN — ACETAMINOPHEN 500 MG: 325 TABLET, FILM COATED ORAL at 14:23

## 2021-05-18 RX ADMIN — ACETAMINOPHEN 500 MG: 325 TABLET, FILM COATED ORAL at 21:09

## 2021-05-18 RX ADMIN — DIVALPROEX SODIUM 500 MG: 125 CAPSULE ORAL at 13:56

## 2021-05-18 RX ADMIN — RISPERIDONE 2 MG: 1 TABLET, FILM COATED ORAL at 17:02

## 2021-05-18 RX ADMIN — CEFAZOLIN SODIUM 2 G: 2 INJECTION, SOLUTION INTRAVENOUS at 21:42

## 2021-05-18 RX ADMIN — PAROXETINE HYDROCHLORIDE 10 MG: 20 TABLET, FILM COATED ORAL at 05:25

## 2021-05-18 RX ADMIN — METHADONE HYDROCHLORIDE 30 MG: 10 TABLET ORAL at 05:26

## 2021-05-18 RX ADMIN — CEFAZOLIN SODIUM 2 G: 2 INJECTION, SOLUTION INTRAVENOUS at 16:42

## 2021-05-18 RX ADMIN — HYDROMORPHONE HYDROCHLORIDE 2 MG: 2 INJECTION, SOLUTION INTRAMUSCULAR; INTRAVENOUS; SUBCUTANEOUS at 02:45

## 2021-05-18 RX ADMIN — DOCUSATE SODIUM 50 MG AND SENNOSIDES 8.6 MG 2 TABLET: 8.6; 5 TABLET, FILM COATED ORAL at 05:15

## 2021-05-18 RX ADMIN — RISPERIDONE 2 MG: 1 TABLET, FILM COATED ORAL at 05:16

## 2021-05-18 RX ADMIN — DIAZEPAM 2.5 MG: 5 TABLET ORAL at 15:52

## 2021-05-18 RX ADMIN — HYDROMORPHONE HYDROCHLORIDE 2 MG: 2 INJECTION, SOLUTION INTRAMUSCULAR; INTRAVENOUS; SUBCUTANEOUS at 15:22

## 2021-05-18 RX ADMIN — DULOXETINE HYDROCHLORIDE 60 MG: 60 CAPSULE, DELAYED RELEASE ORAL at 17:03

## 2021-05-18 RX ADMIN — GABAPENTIN 300 MG: 300 CAPSULE ORAL at 13:56

## 2021-05-18 RX ADMIN — DIVALPROEX SODIUM 500 MG: 125 CAPSULE ORAL at 05:15

## 2021-05-18 RX ADMIN — HYDROMORPHONE HYDROCHLORIDE 2 MG: 2 INJECTION, SOLUTION INTRAMUSCULAR; INTRAVENOUS; SUBCUTANEOUS at 00:58

## 2021-05-18 RX ADMIN — METOPROLOL TARTRATE 25 MG: 25 TABLET, FILM COATED ORAL at 05:16

## 2021-05-18 RX ADMIN — SODIUM CHLORIDE, POTASSIUM CHLORIDE, SODIUM LACTATE AND CALCIUM CHLORIDE 1000 ML: 600; 310; 30; 20 INJECTION, SOLUTION INTRAVENOUS at 13:56

## 2021-05-18 RX ADMIN — GABAPENTIN 300 MG: 300 CAPSULE ORAL at 21:09

## 2021-05-18 RX ADMIN — CEFEPIME 2 G: 2 INJECTION, POWDER, FOR SOLUTION INTRAVENOUS at 02:12

## 2021-05-18 RX ADMIN — DULOXETINE HYDROCHLORIDE 60 MG: 60 CAPSULE, DELAYED RELEASE ORAL at 05:16

## 2021-05-18 RX ADMIN — DOCUSATE SODIUM 50 MG AND SENNOSIDES 8.6 MG 2 TABLET: 8.6; 5 TABLET, FILM COATED ORAL at 17:02

## 2021-05-18 RX ADMIN — DIVALPROEX SODIUM 500 MG: 125 CAPSULE ORAL at 21:09

## 2021-05-18 RX ADMIN — HYDROMORPHONE HYDROCHLORIDE 2 MG: 2 INJECTION, SOLUTION INTRAMUSCULAR; INTRAVENOUS; SUBCUTANEOUS at 21:09

## 2021-05-18 RX ADMIN — MAGNESIUM SULFATE IN WATER 4 G: 40 INJECTION, SOLUTION INTRAVENOUS at 10:33

## 2021-05-18 RX ADMIN — HYDROMORPHONE HYDROCHLORIDE 2 MG: 2 INJECTION, SOLUTION INTRAMUSCULAR; INTRAVENOUS; SUBCUTANEOUS at 11:39

## 2021-05-18 RX ADMIN — CEFEPIME 2 G: 2 INJECTION, POWDER, FOR SOLUTION INTRAVENOUS at 10:19

## 2021-05-18 RX ADMIN — METOPROLOL TARTRATE 25 MG: 25 TABLET, FILM COATED ORAL at 17:02

## 2021-05-18 RX ADMIN — OXYCODONE 5 MG: 5 TABLET ORAL at 00:17

## 2021-05-18 RX ADMIN — HYDROMORPHONE HYDROCHLORIDE 2 MG: 2 INJECTION, SOLUTION INTRAMUSCULAR; INTRAVENOUS; SUBCUTANEOUS at 09:08

## 2021-05-18 ASSESSMENT — PAIN DESCRIPTION - PAIN TYPE
TYPE: ACUTE PAIN

## 2021-05-18 ASSESSMENT — FIBROSIS 4 INDEX: FIB4 SCORE: 0.47

## 2021-05-18 NOTE — OP REPORT
Operative Report    Date: 5/17/2021    PreOp Diagnosis:   1.  Right empyema  2.  Endocarditis  3.  Anemia  4.  History of pseudotumor cerebri  5.  History of CVA  6.  Chronic pain syndrome    PostOp Diagnosis: Same    Procedure(s):  THORACOTOMY - Wound Class: Dirty or Infected  DECORTICATION, LUNG. - Wound Class: Dirty or Infected    Surgeon(s):  David L Penner, M.D. John H Ganser, M.D.  (a second surgeon was required for the entirety of the case due to its complexity)    Anesthesiologist/Type of Anesthesia:  Anesthesiologist: Tyree Yang M.D./General    Surgical Staff:  Circulator: Michael Barksdale R.N.; Deb Coleman R.N.  Scrub Person: Sandy Grewal Assist: DARIUS Bennett    Specimens removed if any:  ID Type Source Tests Collected by Time Destination   A : Right pleura Other Other PATHOLOGY SPECIMEN John H Ganser, M.D. 5/17/2021  5:22 PM    B : Rib Other Other GROSS ONLY REQUEST John H Ganser, M.D. 5/17/2021  6:10 PM        Estimated Blood Loss: 300 mL    Findings: Organizing right empyema with trapped lung and chronic right hemothorax.  After tedious total lung decortication, the lung expanded well    Complications: None noted    Indications:  42-year-old female with multiple medical problems who has been critically ill secondary to bacterial endocarditis, bilateral pulmonary septic emboli and right organizing empyema and chronic hemothorax.  The above procedure was discussed in detail with the multiple services and was cautiously recommended to the patient as well as her family.  The risks, benefits, and alternatives were discussed and she wished to proceed.    Procedure in detail:   The patient was taken the operating room placed on the operating table in supine position.  The patient's existing tracheostomy was attached to the anesthesia circuit.  Appropriate monitoring lines were placed by the anesthesia team.  The patient was then placed in left lateral decubitus position exposing the  right chest.  Care was taken to pad all pressure points.  The existing right chest tube was removed and discarded.  A sterile prep and drape over the right chest was performed in usual fashion.  A timeout was performed.    A right posterolateral thoracotomy was then performed in the usual fashion.  The muscle layers were densely adherent to the surrounding tissue apparently secondary to the history of multiple prior chest tubes and surrounding inflammation.  Dissection through the latissimus and serratus muscles was performed, and then the chest inserted in approximately the 6 intercostal space.  Because of the severe inflammation, the rib above the thoracotomy was dissected out anteriorly and posteriorly, and then divided using a  and removed.  This was passed off as permanent specimen.  Hemostasis was ensured.  This allowed access into the right pleural space.  There was evidence of a large amount of right chronic hemothorax as well as pus.  The extrapleural plane was then dissected out using blunt dissection superiorly, inferiorly, anteriorly and posteriorly to the spine.  This thickened extrapleural tissue was then carefully decorticated off of the lung.  This required considerable tedious blunt and sharp dissection, but the major and minor fissures were exposed, and then the lung was decorticated superiorly, anteriorly, posteriorly, and along the surface of the diaphragm.  A large amount of thickened pleura was sent off as a permanent specimen.  A large amount of hemothorax was also evacuated.  After total lung decortication, the lung expanded well.  Copious warm irrigation was performed.  Hemostasis was ensured.  A 32 Jamaican straight chest tube and a 32 Jamaican angled chest tube were then placed, and sutured in the standard fashion with 0 silk.  The ribs were then reapproximated using #1 Vicryl pericostal sutures, and the chest wall was reapproximated in layers using 0 Vicryl.  A 20 cm Prevena was  placed and appropriate chest tube dressings were also placed.  The patient was then placed supine.  She was allowed to breathe on her own via her existing tracheostomy.  She was then transported back to the intensive care unit in critical but stable condition.  Sponge, instrument, and needle counts were correct x2.      Drew Vasquez M.D.  [facility]  [phone]      5/17/2021 6:26 PM Drew Vasquez M.D.

## 2021-05-18 NOTE — ANESTHESIA PROCEDURE NOTES
Arterial Line  Performed by: Tyree Yang M.D.  Authorized by: Tyree Yang M.D.     Start Time:  5/17/2021 4:48 PM  End Time:  5/17/2021 5:50 PM  Localization: surface landmarks    Patient Location:  OR  Indication: continuous blood pressure monitoring        Catheter Size:  20 G  Seldinger Technique?: Yes    Laterality:  Left  Site:  Radial artery  Line Secured:  Antimicrobial disc, tape and transparent dressing  Events: patient tolerated procedure well with no complications

## 2021-05-18 NOTE — ADDENDUM NOTE
Addendum  created 05/17/21 1926 by Tyree Yang M.D.    Child order released for a procedure order, Clinical Note Signed, Intraprocedure Blocks edited

## 2021-05-18 NOTE — PROGRESS NOTES
Infectious Disease Daily Progress Note    Date of Service  5/18/2021    Chief Complaint  Cefepime 4/23 -  current Day #23     Thoracoscopy & Decortication - 4/28  Decortication 5/17     PRIOR Rx:   Zosyn 4/22-4/23  Previous: Unasyn, Zosyn, Vanco, Daptomycin  Ceftaroline: start 4/13-4/15  Nafcillin 2g Q4 hrs 4/15-4/23 4/14: Unable to give name of previous NSG or neurologist. Seems confused, but able to say some sentences fluently.   4/15: Line cultures now growing MSSA. As well as from the blood. Repeat blood culture 4/13+ in both sets. Patient has worsening confusion. CSF fluid analyses suggest pleocytosis. Cultures are no growth from the CSF. Chest CT was ordered this morning indicating a loculated right pleural effusion as well as bilateral septic emboli. Dr. Mack spoke with Dr. Oh yesterday and no surgical intervention unless clinical deterioration.  4/16: Increased respiratory distress.  Tachypneic.  CT with loculated collection.  Dr Resendiz not available.  No thoracic surgeon available.  Plans to have pulmonary place CT.  Transferring to ICU.  4/17: Transferred to [facility] last night. Hgb dropped to 6.4 requiring PRBC transfusion. Requiring 2 pressors. Fio2 50%. Febrile 38.8. Pending OR decortication of empyema and hemothorax. Chest tube placed at [facility] shows 8,371 WBC, ,000, 74% N  4/18: Chest tube was upsized by surgery yesterday, no decortication. WBC 22k. Fio2 40%. Vasopressin. Levophed down to 2mcg. Afebrile 24 hrs. Last fever 38.6 on 4/16.   4/19: Still on vent. Levo 3 mcg, vasopressin. Afebrile 72 hours. WBC 21k. BC 4/17 NGTD x 48 hours. Unable to do MRI brain given neurostimulator per RN.   4/20: Remaining afebrile. Hb 6.2 and undergoing transfusion today.WBC 24,100, 5% bands.1700 ml CT output. Copious bloody ET secretions. No pressors. Receiving dornase and TPA per CT. Right pleural effusion not large per CXR today.   4/21: WBC 31k. Bronchoscopy yesterday showing blood. Cultures sent. TPA  on hold per RN due to arvind blood from chest tube requiring PRBC transfusion for hgb 6. Pending chest CT today. Per , spinal stimulator is non-MRI compatible. Levophed 10mcg. 30% Fio2. Afebrile.   4/22: Fever 101.3 this am. WBC 20,300 down from 32,200 yesterday. BAL growing Klebsiella pneumoniae but susceptibility panel not set up until today. CTA of brain shows no lesion. CT of chest shows enlarging cavitary lung lesions bilat and large loculated new left pleural effusion and large hydropneumothorax on right. TPA & dornase infusions of right chest ended 4/20 after considerable bleeding requiring transfusion. Hb now down again to 6.8.  4/23: WBC 23k. Fio2 40%. Tmax 38.1. US of stiumlator shows small fluid collection but no drainable abscess. Pending MICAH today. Pending L chest tube placement  4/24: Continue fever 100.8-101.8. WBC 16,600. MICAH shows large vegetations on both tricuspid valve and mitral valve with mild TR & MR.  No aortic root abscess. Left chest tube placed yesterday yielding 8 ml of old bloody fluid. Bronch today revealed copious thick maroon secretions in distal trachea and both mainstem bronchi. On the right these were obstructive. Remaining off pressors. Last positive blood cultures (MSSA) were 4/16, negative 4/17.  4/25: Fever 100.6 this AM. wBC 13,300. Hb 6.6. CT: right hydropneumothorax increasing, right bronchopleural fistula, mediastinal shift ot the left , multiple cavitary pulmonary nodules, 3.1 cm left basilar mass, splenomegaly. CT of head shows dural thickening over the clivus. Lac+ GNR >100,000 col/ml growing from BAL of 4/24, presumed Klebsiella. Left peural fluid culture negative from 4/23.  4/26: Fever 100.4 last night. WBC 13,500. Hb 7.4. Plts 502,000. ESR >120. UA fairly clear except 30 mg% protein, 2-5 wbc and rare rbc. CXR unchanged except more prominent pulmonary edema. GNR in BAL may be a different species of Klebsiella, and resistant to ampicillin & tmp-sulfa;  confirmation pending.  4/28: Fever 100.8 last night. WBC 27,700. Hb 6.6. Plts 482,000. Creat 0.19. Kleb pneum in BAL on 4/24 is resistant to ampicillin & tmp-sulfa but otherwise sensitive. O2 sat 96% on FIO2 30% now, PEEP 8. Has been re-evaluated by thoracic surgeon, Dr. Ganser, who believes she will not wean without decortication on the right. Surgery anticipated today.  4/29: S/P right thoracoscopy with decortication with cultures NG at 24 hrs.  4/30: Had a bronch due to hypoxia and hypotension. CXR with worsening right hemithorax pulmonary opacities. Bloody mucous plugs removed. Pressors started. Febrile this am. Tachy in 130s. Pressors just removed. Tolerating vent, but not a SBT candidate at the moment.  5/1: Small air leak CT-2 every ~ 2/3 breathes. The K. pneumoniae from her thoracoscopy is sensitive to her cefepime so no antibiotic change needed.   5/2: No air leak today she tolerated 2  hrs of spontaneous breathing with support today.      5/3: Second day with SBT and doing well now for 2 hrs. Slight bump in temperature and      WBC's but no obvious clinical infectious changed so watch closely.      5/4: Fever still from time to time. WBCs trending up. Chest tubes in place. Trach.       5/5: She clearly is better today she was sitting up in bed with open eyes and follows commands. She still has low grade fever but her WBC's are dropping. Cefepime dose increased yesterday for increased CNS coverage if necessary. She will probably need further thoracic surgery as mentioned by Dr. Ganser. The issue of what to do with her stimulator is still up in the air for now as it probably will need to be removed but she is nort a candidate for more major surgery at this time.       5/7: She continues to improve and under go longer SBT trials. She just had a new PICC placed in her right arm and plan is to D/C central line.        5/8: PICC placed. No fevers. Comfortable. Cortrak placed. Failing SBTs.  5/9: No acute issues.  No fevers. Comfortable. Family at bedside.   5/10: No acute issues, fevers or WBC bumps. No changes in condition. She is to get her CT scan today and be reviewed by surgery  5/11:  at bedside.  Has been referred to LANE.  Repeat CT scan completed.  Results noted.  ? If Dr Ganser has seen.  Still with an empyema:  ? If candidate for further surgery.  5/12: Pending next thoracic surgery. No fever. Anxious.  5/13: ? Surgery date.  No one seems to know.  Had speaking valve in and having swallow eval with speech therapy  5/14: No acute issues. No fevers. Surgery Monday.  5/15: One CT accidentally pulled out yesterday.  Has been on T piece and tolerating. Plans for surgery Monday afternoon.  5/16: On schedule for surgery 5/17: 4:30 pm.  Moved to room T614.  No new issues.  5/17: Was sitting up in the bedside chair since change of shift.  Now walking in hallways with RN and CNA.  5/18: Decortication yesterday with 2 chest tube placement. WBC improving 21->14k    Review of Systems  Review of Systems   All other systems reviewed and are negative.       Physical Exam  Temp:  [35.9 °C (96.7 °F)-36.3 °C (97.4 °F)] 36.1 °C (97 °F)  Pulse:  [] 133  Resp:  [17-30] 24  BP: ()/(42-82) 93/66  SpO2:  [90 %-98 %] 93 %    Physical Exam  Constitutional:       Appearance: Normal appearance.   HENT:      Head:      Comments: NG tube  Skin:     Comments: R sided chest tubes x 2 with serosanguinous fluid   Neurological:      Mental Status: She is alert and oriented to person, place, and time.   Psychiatric:         Mood and Affect: Mood normal.         Fluids    Intake/Output Summary (Last 24 hours) at 5/18/2021 1153  Last data filed at 5/18/2021 0800  Gross per 24 hour   Intake 280 ml   Output 480 ml   Net -200 ml       Laboratory  Recent Labs     05/17/21  0351 05/17/21  2310 05/18/21  0910   WBC 7.0 21.8* 14.8*   RBC 3.57* 3.67* 3.56*   HEMOGLOBIN 10.3* 10.7* 10.2*   HEMATOCRIT 34.0* 34.5* 33.3*   MCV 95.2 94.0 93.5    MCH 28.9 29.2 28.7   MCHC 30.3* 31.0* 30.6*   RDW 59.0* 59.3* 61.1*   PLATELETCT 338 322 404   MPV 9.1 9.2 9.2     Recent Labs     05/17/21  0351 05/17/21  2310 05/18/21  0910   SODIUM 138 138 139   POTASSIUM 3.7 3.7 4.2   CHLORIDE 103 103 105   CO2 25 24 24   GLUCOSE 91 119* 108*   BUN 18 18 21   CREATININE 0.25* 0.24* 0.33*   CALCIUM 9.6 9.0 9.3     Recent Labs     05/17/21  0351   INR 1.21*                Impression   -Severe sepsis  -Catheter related bloodstream infection due to infected port status post removal 4/12-staph aureus  -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       - Acute left empyema s/p chest tube placement 4/23, decortication 5/17 - Klebsiella  -Multiple acute septic pulmonary emboli bilaterally  -Acute bilateral pneumonia due to above     --Pulmonary edema  -Pseudotumor cerebri with underlying  shunt: possible arachnoiditis  -Chronic migraine headaches  -History of autoimmune vasculitis  -Elevated alkaline phosphatase & bilirubin  -Acute encephalopathy due to sepsis      - anemia due to above       Plan   S/p decortication.   Continue to treat Klebsiella empyema and MSSA TV/MV endocarditis  continue 4 weeks beyond last decortication  Patient was initially on cefepime for Klebsiella but more importanly for CNS penetration given concern for ventriculitis and need for CNS coverage. She's had 3 weeks of abx therapy, think it is safe to transition back to Cefazolin 2gm IV Q8hrs. Spoke with [facility]  - Current abx covering CNS and back hardware.           - This HW is not coming out anytime soon per NS. Will need suppression therapy afterward targeting MSSA (keflex)       > 35 min on floor in ICU with > 50% face to face view and 100% in direct patient care/counseling today.

## 2021-05-18 NOTE — CARE PLAN
The patient is Unstable - High likelihood or risk of patient condition declining or worsening         Progress made toward(s) clinical / shift goals:    patient is not progressing towards the following goals:

## 2021-05-18 NOTE — PROGRESS NOTES
Report received from CAROLINA Patrick. Transport at bedside to take patient to pre-op at this time.

## 2021-05-18 NOTE — ANESTHESIA PROCEDURE NOTES
Central Venous Line  Performed by: Tyree Yang M.D.  Authorized by: Tyree Yang M.D.     Start Time:  5/17/2021 4:25 PM  End Time:  5/17/2021 7:30 PM  Patient Location:  OR  Indication: central venous access        provider hand hygiene performed prior to central venous catheter insertion, all 5 sterile barriers used (gloves, gown, cap, mask, large sterile drape) during central venous catheter insertion and skin prep agent completely dried prior to procedure    Patient Position:  Trendelenburg  Laterality:  Right  Site:  Internal jugular  Prep:  Chlorhexidine  Number of Lumens:  Triple lumen  target vein identified, needle advanced into vein and blood aspirated and guidewire advanced into vein    Seldinger Technique?: Yes    Ultrasound-Guided: ultrasound-guided  Image captured, interpreted and electronically stored.  Sterile Gel and Probe Cover Used for Ultrasound?: Yes    Intravenous Verification: verified by ultrasound, venous blood return and chest x-ray pending    all ports aspirated, all ports flushed easily, guidewire was removed intact, biopatch was applied, line was sutured in place and dressing was applied    Events: patient tolerated procedure well with no complications    PA Catheter Placed?: No

## 2021-05-18 NOTE — PROGRESS NOTES
"Progress Note:  5/18/2021, 12:58 PM    S: No acute events.  Sleepy this morning but following commands. Nonfocal. Tachycardia, tachypnea.    O:  BP (!) 93/66   Pulse (!) 133   Temp 36.1 °C (97 °F) (Temporal)   Resp (!) 24   Ht 1.6 m (5' 2.99\")   Wt 64.3 kg (141 lb 12.1 oz)   SpO2 93%     Sleepy but arousable  Moved all extremities equally  Ventilator via tracheostomy  R chest tubes to suction, no air leak, serosanguinous output      A:   Active Hospital Problems    Diagnosis    • Debility [R53.81]    • Elevated alkaline phosphatase level [R74.8]    • Agitation requiring sedation protocol [R45.1]    • Spinal cord stimulator status [Z96.89]    • Goals of care, counseling/discussion [Z71.89]    • Fever [R50.9]    • Pulmonary abscess (HCC) [J85.2]    • 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]    • Pleural effusion, left [J90]    • Anasarca [R60.1]    • Thrombocytosis (HCC) [D47.3]    • Atelectasis [J98.11]    • Acute respiratory failure with hypoxia (HCC) [J96.01]    • Prolonged Q-T interval on ECG [R94.31]    • Acute bacterial endocarditis [I33.0]    • Empyema of right pleural space (HCC) [J86.9]    • CSF pleocytosis [D72.9]    • Bacteremia due to methicillin susceptible Staphylococcus aureus (MSSA) [R78.81, B95.61]    • History of vasculitis [Z86.79]    • Pneumonia [J18.9]    • History of complicated migraines [G43.109]    • GERD (gastroesophageal reflux disease) [K21.9]    • Hyponatremia [E87.1]    • Tachycardia [R00.0]    • Pseudotumor cerebri [G93.2]      IMO load March 2020     • H/O: CVA (cerebrovascular accident) [Z86.73]    • Chronic pain syndrome [G89.4]    • Thrombocytopenia (HCC) [D69.6]    • Septic shock (HCC) [A41.9, R65.21]    • Anemia [D64.9]    • Sepsis (HCC) [A41.9]    • Hypokalemia [E87.6]    • Hypomagnesemia [E83.42]    • S/P  shunt [Z98.2]    • Anxiety [F41.9]    • Acute encephalopathy [G93.40]      CXR shows significant improvement in R lung aeration    P:   -Continue chest tubes to suction  -Pulmonary " hygiene  -Further care per primary team    Drew Vasquez M.D.  [facility]  [phone]

## 2021-05-18 NOTE — OR NURSING
1915- Dr Yang notified of CXR results and VS. Ok to transfer to [facility]

## 2021-05-18 NOTE — PROGRESS NOTES
Hospital Medicine Daily Progress Note    Date of Service  5/18/2021    Chief Complaint  50 y.o. female admitted 4/16/2021 with recurrent infections    Hospital Course   This is a 50-year-old female with a past medical history of pseudotumor cerebri s/p  shunt implantation by Dr. Oh neurosurgery, chronic pain syndrome with a history of placement of a nerve stimulator, vasculitis, right anterior chest port for home IV medication administration, recurrent infections related to port, presented on 4/11 to [facility] with recurrent port infection was initially treated with vancomycin and Zosyn and then changed to ceftaroline subsequently switched to nafcillin, MSSA identified.  [facility] infectious disease is managing antibiotics for the patient.  Dr. Candelaria surgery removed the port on 4/12.  The patient was further identified to have endocarditis of the tricuspid valve, a lumbar puncture performed on 4/14 showed pleocytosis with negative Gram stain.  The patient suffered worsening leukocytosis and was found to have septic emboli per CT.  Initially a small pleural catheter was placed.  Significant loculations were encountered.  MSSA empyema was diagnosed.  Neurosurgery was consulted to comment on possibly removal of a  shunt, this was felt not appropriate at this time.  The patient remained on mechanical ventilation for 15 days, then a perc trach was placed.  The patient was eventually liberated from mechanical ventilation, the patient did have a right thoracoscopy and decortication of the lung performed on 4/28 by Dr. Ganser.  The patient was of identified to have multiple sources for MSSA including mitral valve, tricuspid valve vegetation, port, spinal stimulator,  shunt possibly.  Back to the OR for VAT and decortication on 5/17    Interval Problem Update  ROS limited by Trach  Pt nods yes to question of if she is in pain  AFebrile  Sinus tach  SBP   10 litres via trach collar  2  chest tubes total output 480ml/24hrs.  No air leak    Sinus tach noted which drops 30 points when she's asleep.  Given fluid bolus with no change.  Will be more agressive with treatment of anxiety    Consultants/Specialty  ID      Code Status  Full Code    Disposition  Once CT's are out will probably go to LTACH    Review of Systems  Review of Systems   Unable to perform ROS: Other        Physical Exam  Temp:  [35.9 °C (96.7 °F)-36.3 °C (97.4 °F)] 35.9 °C (96.7 °F)  Pulse:  [] 119  Resp:  [17-30] 26  BP: ()/(42-82) 101/60  SpO2:  [90 %-98 %] 93 %    Physical Exam  Vitals reviewed.   Constitutional:       General: She is not in acute distress.     Appearance: She is well-developed. She is not diaphoretic.   HENT:      Head: Normocephalic and atraumatic.   Neck:      Vascular: No JVD.   Cardiovascular:      Rate and Rhythm: Normal rate and regular rhythm.   Pulmonary:      Effort: Pulmonary effort is normal. No respiratory distress.      Breath sounds: No stridor. No wheezing or rales.   Abdominal:      Palpations: Abdomen is soft.      Tenderness: There is no abdominal tenderness. There is no guarding or rebound.   Skin:     General: Skin is warm and dry.      Findings: No rash.   Neurological:      Mental Status: She is alert and oriented to person, place, and time.   Psychiatric:         Thought Content: Thought content normal.         Fluids    Intake/Output Summary (Last 24 hours) at 5/18/2021 0907  Last data filed at 5/18/2021 0400  Gross per 24 hour   Intake 280 ml   Output 480 ml   Net -200 ml       Laboratory  Recent Labs     05/17/21  0351 05/17/21  2310   WBC 7.0 21.8*   RBC 3.57* 3.67*   HEMOGLOBIN 10.3* 10.7*   HEMATOCRIT 34.0* 34.5*   MCV 95.2 94.0   MCH 28.9 29.2   MCHC 30.3* 31.0*   RDW 59.0* 59.3*   PLATELETCT 338 322   MPV 9.1 9.2     Recent Labs     05/16/21  0330 05/17/21  0351 05/17/21  2310   SODIUM 139 138 138   POTASSIUM 4.0 3.7 3.7   CHLORIDE 103 103 103   CO2 28 25 24   GLUCOSE  110* 91 119*   BUN 21 18 18   CREATININE 0.22* 0.25* 0.24*   CALCIUM 9.6 9.6 9.0     Recent Labs     05/17/21  0351   INR 1.21*               Imaging  DX-ABDOMEN FOR TUBE PLACEMENT   Final Result      Feeding tube looped in the stomach.      DX-CHEST-PORTABLE (1 VIEW)   Final Result         1. Stable lines and tubes. No pneumothorax detected.   2. Persistent opacities in the right perihilar region and in the right lung periphery, similar to prior study. Left lung is essentially clear.      DX-CHEST-PORTABLE (1 VIEW)   Final Result      Postoperative changes of the right chest with placement of an additional chest tube. There is improved aeration in the right lung. No significant pleural effusion or definite pneumothorax.      Right IJ central venous catheter tip projects over the proximal right atrium.      Hypoinflation with interstitial and hazy pulmonary opacities.      DX-CHEST-PORTABLE (1 VIEW)   Final Result         1.  Hazy right pulmonary infiltrates and/or effusion, similar compared to prior study.      DX-ABDOMEN FOR TUBE PLACEMENT   Final Result      Feeding tube tip projects over the expected location the gastric antrum.      DX-CHEST-PORTABLE (1 VIEW)   Final Result         1.  Hazy right pulmonary infiltrates and/or effusion, similar compared to prior study.      DX-CHEST-LIMITED (1 VIEW)   Final Result      1.  No pneumothorax. Only one chest tube is now seen in the right lung.   2.  The chest is otherwise stable.      DX-CHEST-PORTABLE (1 VIEW)   Final Result         1.  Hazy right pulmonary infiltrates and/or effusion, similar compared to prior study.      DX-CHEST-PORTABLE (1 VIEW)   Final Result         1.  Hazy right pulmonary infiltrates and/or effusion, similar compared to prior study.      DX-ABDOMEN FOR TUBE PLACEMENT   Final Result         1.  Nonspecific bowel gas pattern.   2.  Dobbhoff tube tip terminates overlying the expected location of the gastric antrum.   3.  Hazy bilateral lung  base infiltrates, greater on the right.      DX-CHEST-PORTABLE (1 VIEW)   Final Result         1.  Hazy right pulmonary infiltrates and/or effusion, similar compared to prior study.      US-RUQ   Final Result         1.  Hepatomegaly   2.  Mild intrahepatic and extrahepatic biliary ductal dilatation, commonly associated with postcholecystectomy physiology, consider causes of biliary obstruction with additional workup as clinically appropriate      CT-CHEST,ABDOMEN,PELVIS WITH   Final Result      1.  Large empyema in the RIGHT hemithorax, slightly decreased in size from prior exam with chest tubes present.   2.  Consolidation remains.   3.  Multiple cavitary lesions again seen in the LEFT upper lobe, minimally decreased from prior, concerning for septic emboli.   4.  Supportive tubing is unchanged.   5.  Intrahepatic and extrahepatic biliary dilation, likely postoperative although distal stricture, stone or mass remain possibilities.   6.  Moderate pelvic fluid, nonspecific.   7.  No evidence of bowel obstruction or perforation.      DX-CHEST-PORTABLE (1 VIEW)   Final Result         1.  Hazy right pulmonary infiltrates and/or effusion, similar compared to prior study.      DX-CHEST-PORTABLE (1 VIEW)   Final Result         No significant interval change.            DX-CHEST-PORTABLE (1 VIEW)   Final Result         Interval removal of left central venous catheter. Interval adjustment of right PICC with tip in the SVC.         DX-ABDOMEN FOR TUBE PLACEMENT   Final Result      Feeding tube tip at the mid to distal stomach.      IR-PICC LINE PLACEMENT W/ GUIDANCE > AGE 5   Final Result                  Ultrasound-guided PICC placement performed by qualified nursing staff as    above.          DX-CHEST-PORTABLE (1 VIEW)   Final Result      1.  Stable cardiopulmonary findings.   2.  See comments above regarding the right-sided PICC position.      DX-CHEST-PORTABLE (1 VIEW)   Final Result      Stable chest findings compared  to 5/5.      DX-CHEST-PORTABLE (1 VIEW)   Final Result      Stable chest findings compared with 5/3.      DX-ABDOMEN FOR TUBE PLACEMENT   Final Result         Feeding tube with tip projecting over the expected area of the stomach.      DX-CHEST-PORTABLE (1 VIEW)   Final Result      Stable chest findings compared with prior.      DX-CHEST-PORTABLE (1 VIEW)   Final Result         1. No significant interval change.      US-EXTREMITY ARTERY LOWER UNILAT RIGHT   Final Result      DX-CHEST-PORTABLE (1 VIEW)   Final Result         1. No significant interval change.      IR-PICC LINE PLACEMENT W/ GUIDANCE > AGE 5   Final Result                  Ultrasound-guided PICC placement performed by qualified nursing staff as    above.          DX-CHEST-PORTABLE (1 VIEW)   Final Result         1.  Pulmonary edema and/or infiltrates, similar to prior study.   2.  Stable opacification right lung, likely effusion. Thoracostomy tubes are in place.   3.  Multiple cavitary appearing left pulmonary lesions, see CT performed April 27, 2021 for further characterization      DX-CHEST-PORTABLE (1 VIEW)   Final Result         1.  Pulmonary edema and/or infiltrates, similar to prior study.   2.  Single opacification right lung, likely effusion. Thoracostomy tubes are in place.   3.  Multiple cavitary appearing left pulmonary lesions, see CT performed April 27, 2021 for further characterization   4.  Soft tissue gas in the right chest wall      DX-CHEST-PORTABLE (1 VIEW)   Final Result         1.  Pulmonary edema and/or infiltrates, similar to prior study.   2.  Increased opacification right lung, likely increased effusion. Thoracostomy tubes are in place.   3.  Multiple cavitary appearing left pulmonary lesions, see CT performed April 27, 2021 for further characterization   4.  Soft tissue gas in the right chest wall      DX-CHEST-PORTABLE (1 VIEW)   Final Result         1.  Pulmonary edema and/or infiltrates, similar to prior study.   2.  Right  pneumothorax, stable compared to prior study, right thoracostomy tubes remain in place   3.  Multiple cavitary appearing left pulmonary lesions, see CT performed April 27, 2021 for further characterization   4.  Soft tissue gas in the right chest wall      DX-CHEST-PORTABLE (1 VIEW)   Final Result         1.  Pulmonary edema and/or infiltrates, similar to prior study.   2.  Right pneumothorax, interval decreased compared to prior study, interval placement of right thoracostomy tubes is noted.   3.  Multiple cavitary appearing left pulmonary lesions, see CT performed yesterday for further characterization   4.  Soft tissue gas in the right chest wall      DX-CHEST-PORTABLE (1 VIEW)   Final Result         1.  Pulmonary edema and/or infiltrates, similar to prior study.   2.  Right pneumothorax, stable compared to prior study, interval removal of right thoracostomy tube is noted.   3.  Multiple cavitary appearing left pulmonary lesions, see CT performed yesterday for further characterization   4.  Soft tissue gas in the right chest wall      DX-CHEST-LIMITED (1 VIEW)   Final Result         No significant interval change.      CT-CTA CHEST PULMONARY ARTERY W/ RECONS   Final Result         No CT evidence of pulmonary embolus.      Previous right chest tube were removed.      Grossly similar in size of the loculated right hydropneumothorax but there is increased layering fluid component. Unchanged mild shift of the mediastinal to the left.      Redemonstration of a pigtail catheter in the medial left lung base. Grossly similar multiple cavitary lesions in the left lung.      US-EXTREMITY VENOUS LOWER BILAT   Final Result      POCT BUTTERFLY-DUPLEX SCAN EXTREMITY VEINS LIMITED   Final Result      DX-CHEST-PORTABLE (1 VIEW)   Final Result         1.  Pulmonary edema and/or infiltrates, similar to prior study.   2.  Right pneumothorax, somewhat decreased to prior study, interval removal of right thoracostomy tube is noted.    3.  Multiple cavitary appearing left pulmonary lesions, see CT performed yesterday for further characterization   4.  Soft tissue gas in the right chest wall      DX-CHEST-PORTABLE (1 VIEW)   Final Result         1.  Pulmonary edema and/or infiltrates, similar to prior study.   2.  Right pneumothorax, similar to prior study with thoracostomy tube in place.   3.  Multiple cavitary appearing left pulmonary lesions, see CT performed yesterday for further characterization      CT-CHEST (THORAX) W/O   Final Result      Large loculated right hydropneumothorax with interval increase in size of the pneumothorax and pleural fluid.      Right chest tube is in the posterior right thorax.      There is mediastinal shift to the left compatible with tension.      Small pericardial effusion.      Small loculated left pleural effusion with overlying atelectasis/consolidation.   Pigtail catheter is seen at the medial left lung base. Pleural effusion has decreased in size compared to prior.      There are multiple foci of air extending from the right lung to the pleural space suspicious for a bronchopleural fistula.      Multiple cavitary lesions bilaterally with mild interval decrease of the solid component of the cavitary nodules.      Noncavitary 3.1 cm masslike density is seen at the left lung base.      Findings may be related to septic emboli, malignancy or multifocal abscesses. Short interval follow-up recommended.      Soft tissue air on the right is again seen.      Splenomegaly      Findings discussed with Leti Sun.      DX-CHEST-PORTABLE (1 VIEW)   Final Result      No significant change from prior exam.      CT-HEAD W/O   Final Result      1.  RIGHT frontal ventriculostomy catheter unchanged in position.   2.  Mild cortical atrophy.   3.  No intracranial hemorrhage.   4.  Apparent dural thickening overlying the clivus.  Consider further evaluation with contrast-enhanced MRI.      DX-CHEST-LIMITED (1 VIEW)   Final  Result      Interval left basilar pigtail catheter with diminished atelectasis, pleural fluid      Stable right hydropneumothorax with thoracostomy tube in place, considerable soft tissue gas and partial lung collapse      IR-CHEST TUBE-EMPYEMA LEFT   Final Result      1.  CT GUIDED LEFT  10 Sinhala PIGTAIL CHEST TUBE PLACEMENT FOR POSSIBLE EMPYEMA YIELDING OLD BLOODY FLUID.      2. A CHEST RADIOGRAPH IS FORTHCOMING.      EC-MICAH W/O CONT   Final Result      US-ABDOMEN LTD (SOFT TISSUE)   Final Result      No fluid seen surrounding the electronic implanted spinal stimulator device.      DX-CHEST-LIMITED (1 VIEW)   Final Result      1.  Large right hydropneumothorax with right-sided chest tube in place, likely stable to slightly decreased from prior study.   2.  Small left pleural effusion. Mild improved aeration in the left lung.   3.  Bilateral pulmonary opacities which could be related to combination of atelectasis, edema and/or infection..      CT-CTA HEAD WITH & W/O-POST PROCESS   Final Result      1.  Patent carotid and vertebral arteries.      2.  Again seen right frontal the jugular peritoneal shunt catheter. There is mild increase in volume of the lateral and third ventricles.      CT-CHEST (THORAX) W/O   Final Result      1.  Interval placement of a right-sided chest tube into a large loculated right-sided pleural effusion. There is now seen a large right-sided hydropneumothorax in the area that previously seen fluid. The chest tube extends into that hydropneumothorax.    There is some residual pleural fluid seen as well. A hydropneumothorax is somewhat loculated with components most prominently inferiorly and medially.      2.  New loculated left pleural effusion.      3.  Again seen multiple bilateral cavitary pulmonary masses which are more prominent than previously seen with increasing cavitation and measure up to 3 cm size. Differential diagnosis includes septic emboli, multifocal abscesses and neoplasm.       4.  Large amount of subcutaneous emphysema on the right.      5.  Splenomegaly.      6.  Multiple support devices.      Fleischner Society pulmonary nodule recommendations:         DX-CHEST-LIMITED (1 VIEW)   Final Result      1.  Support devices as described above.      2.  Right chest tube present with a again seen right-sided pneumothorax, possibly increased in size and loculated.      3.  Worsening bilateral pulmonary infiltrates.      DX-ABDOMEN FOR TUBE PLACEMENT   Final Result      Cortrak feeding tube tip projects in the region of the duodenal bulb.      DX-CHEST-LIMITED (1 VIEW)   Final Result      Loculated right pneumothorax is decreased in size compared to prior.      Small left pleural effusion.      Small loculated right pleural effusion.      Extensive bilateral airspace opacities are not significantly changed.      Soft tissue air on the right is again noted.      DX-CHEST-LIMITED (1 VIEW)   Final Result      Decreased partially loculated right pleural fluid with increased pleural air. Right chest tube is in place.      Increased hazy opacity in the left lung possibly atelectasis.         US-ABDOMEN LTD (SOFT TISSUE)   Final Result      No drainable fluid collection.      DX-CHEST-PORTABLE (1 VIEW)   Final Result      Decreased partially loculated right pleural fluid with increased pleural air. Right chest tube is in place.      No other significant change.      DX-CHEST-PORTABLE (1 VIEW)   Final Result         1.  Pulmonary edema and/or infiltrates are identified, which are somewhat decreased since the prior exam.   2.  Moderate loculated appearing right pleural effusion, slightly decreased since prior      DX-ABDOMEN FOR TUBE PLACEMENT   Final Result      Feeding tube tip at the distal stomach.      DX-CHEST-PORTABLE (1 VIEW)   Final Result         1.  New chest tube is noted in the right lower hemithorax.      2.  Right pleural effusion is again identified which appears similar to the prior  exam.      3.  No new infiltrates or consolidations are identified.      DX-CHEST-PORTABLE (1 VIEW)   Final Result         1.  Pulmonary edema and/or infiltrates are identified, which are stable since the prior exam.   2.  Moderate loculated appearing right pleural effusion      DX-CHEST-PORTABLE (1 VIEW)   Final Result         No significant interval change.      POCT BUTTERFLY-ECHOCARDIOGRAM LTD W/O CONT    (Results Pending)        Assessment/Plan  * Empyema of right pleural space (HCC)- (present on admission)  Assessment & Plan  Patient s/p multiple chest tubes, thoracotomy and decortication  Now s/p second decortication 5/17      Debility  Assessment & Plan  Acute on chronic, the patient likely need of LTAC treatment post acute treatment    Elevated alkaline phosphatase level- (present on admission)  Assessment & Plan  The patient not able to tolerate a MRCP at this time, ultrasound grossly unremarkable, observe    Agitation requiring sedation protocol- (present on admission)  Assessment & Plan  Monitor,    Goals of care, counseling/discussion  Assessment & Plan  Patient with overall high risk of morbidity and mortality given her gravely ill state and prior chronic medical conditions  Palliative care assisting    Spinal cord stimulator status  Assessment & Plan  Patient's stimulator is Nuvectra. It is FDA approved as MRI compatible, but the company has gone out of business, so there is no support team to assist with MRI.  Thus, if MRI were performed, our radiologists would need to protocol it correctly to manage the stimulator. The former rep from [employer] is Andre, [phone].  Information obtained from Dr. Mahoney's office, [phone].     Per  (5/4/2021), it is not MRI compatible unfortunately.     Thrombocytosis (HCC)  Assessment & Plan  Acute reaction since resolved    Anasarca  Assessment & Plan  Improved nutritional status, skin protective measures    Trapped lung  Assessment & Plan  Now s/p  decortication 5/17  CTs in place with significant drainage    Pulmonary abscess (HCC)  Assessment & Plan  From septic emboli  Now s/p decortication    Fever  Assessment & Plan  Resolved, monitor, antibiotic therapy    Atelectasis  Assessment & Plan  Pulmonary recruiting, respiratory care    Pneumonia  Assessment & Plan  MSSA  Ancef per ID    History of vasculitis- (present on admission)  Assessment & Plan  Does not appear to be a current issue    Bacteremia due to methicillin susceptible Staphylococcus aureus (MSSA)- (present on admission)  Assessment & Plan  Infectious disease assisting with antibiotic treatment  Multiple potential sources  Endocarditis  ID following    CSF pleocytosis- (present on admission)  Assessment & Plan  Infectious disease managing    Acute bacterial endocarditis- (present on admission)  Assessment & Plan  Mitral valve, tricuspid valve  MSSA  Ancef per ID    Acute blood loss anemia  Assessment & Plan  Likely secondary to hemothorax  300 cc of blood removed after chest tube placed at [facility]  Trend hemoglobin    Prolonged Q-T interval on ECG- (present on admission)  Assessment & Plan  Resolved after correcting metabolic issues  Cont to follow   Monitor electrolytes, acidosis etc    Acute respiratory failure with hypoxia (HCC)- (present on admission)  Assessment & Plan  Secondary to MSSA bacteremia, empyema, pulmonary septic emboli  S/p tracheostomy  Ongoing respiratory care, pulmonary toilet, antibiotic treatment  Prolonged ventilator dependence    History of complicated migraines- (present on admission)  Assessment & Plan  Monitor    GERD (gastroesophageal reflux disease)- (present on admission)  Assessment & Plan  History of, as needed treatment    Hyponatremia  Assessment & Plan  Resolved  Follow daily    Tachycardia  Assessment & Plan  Persistent, likely secondary with multiple underlying ongoing conditions  Monitor    Pseudotumor cerebri  Assessment & Plan  History of  shunt,  neurosurgery opted against removal    H/O: CVA (cerebrovascular accident)- (present on admission)  Assessment & Plan  Monitor    Chronic pain syndrome- (present on admission)  Assessment & Plan  Pre-existing, pain management    Thrombocytopenia (HCC)- (present on admission)  Assessment & Plan  Transient, resolved    Anemia  Assessment & Plan  Monitor, transfuse as indicated    Septic shock (HCC)- (present on admission)  Assessment & Plan  Multiple sources, recovered with aggressive treatment, IV antibiotics long-term    Sepsis (HCC)  Assessment & Plan  Multiple sources possible, recovered with medical treatment  Now on long term treatment of MSSA bacteremia   [facility] following    Hypomagnesemia  Assessment & Plan  Monitor, replace and recheck    Hypokalemia  Assessment & Plan  Monitor, replace and recheck    Anxiety- (present on admission)  Assessment & Plan  Likely acute on chronic  Prn management  SSRI    S/P  shunt- (present on admission)  Assessment & Plan  History of, Dr. Oh felt there is currently no need to remove    Acute encephalopathy- (present on admission)  Assessment & Plan  Per neurosurgery no need to remove  shunt  Improving with ongoing medical treatment         VTE prophylaxis: enoxaparin

## 2021-05-18 NOTE — THERAPY
Missed Therapy     Patient Name: Enedelia Pang  Age:  50 y.o., Sex:  female  Medical Record #: [id number]  Today's Date: 5/18/2021    Discussed missed therapy with RN    Attempted to see pt for OT session. Per RN pt with 2 new CTs and tachycardia at rest. RN recommended hold today. Will reattempt as appropriate/able.

## 2021-05-18 NOTE — PROGRESS NOTES
"Critical Care Progress Note    Date of admission  4/16/2021    Chief Complaint  \"50 y.o. female the past medical history of pseudotumor cerebri status post  shunt by Dr. Oh, chronic pain with history of placement of nerve stimulator, vasculitis, right anterior chest port for home IV meds with recurrent infection who presented 4/11/2021 with to [facility] with recurrent port infection. Found to have MSSA bacteremia, endocarditis, septic pulmonary emboli/empyema/pneumatocele, and CSF pleocytosis concerning for  shunt involvement.  Seen by Dr. Oh, NSGY, at [facility] who did not recommend  shunt removal.  Seen by Dr. Vargas here for concern of fluid collection around her spinal stimulator and he recommended against removal. Remains intubated, encephalopathic.\"     Hospital Course  4/16 admitted to ICU  4/17 VD 2, 2 FFP, pRBC overnight; new chest tube per gen surg  4/18 VD 3, TPa/Dornase with 1400 cc from R chest tube  4/26 R chest tube not functioning, d/c it.  broke his leg and going to OR today, so not available currently.  VD 11.  8/30%. RSBI immediately high on SBT attempt. Followed commands for RN  4/27 VD12. 8/30%. Not tolerating wean, high RSBI and tachycardic.   4/28 VD 13. VATS with Dr. Ganser.    4/29 VD 14. 8/30%. Still not tolerating wean d/t severe tachycardia just with SAT. Trialed precedex and she required fentanyl up to 600/hr to tolerate, so back on propofol. Chest tubes -40  4/30 VD 15. 8/30%. Try ketamine/versed as sedative. Severe tachycardia and HTN with weaning down propofol. Plan for perc trach tomorrow. Chest tubes -40  5/1 perc trach. PICC placed. Chest tubes remain -40.  5/2 Able to spont well on sedation, but very tachy/HTN when sedation is turned down.  5/3 midazolam weaned down to 3mg/min  5/4 off versed and ketamine gtt  5/5 trials of SBT w/ trach  5/6 T piece trial during day, mobilize to chair x 2, rest vent 8/8 at night, started methadone, gabapentin   5/7 T piece during day " with, rest PS 8/8 overnight, spacing out dilaudid PRNs  5/8 T-piece during day, still with anxiety weaning dex gtt  5/10 CT chest  5/11 will require thoracotomy and decortication  5/12 off ventilator x 24 hours  5/13 replete magnesium, remains on T-piece, ambulating, transfer to med/surg  //  5/17 -right thoracotomy with total lung decortication, remains on T-piece postoperatively    HPI:  Critical care signing back on tonight after total right lung decortication for organized empyema  She was recovered in PACU, and is now on T-piece.  Follow-up ABG showed mild respiratory acidosis  She has 2 right chest tubes with marked improvement in aeration of right lung postoperatively, no pneumothorax on CXR  Minimize sedation, dexmedetomidine tonight if needed, pulmonary hygiene, follow-up arterial blood gas, positive airway pressure if needed    Review of Systems  Review of Systems   Unable to perform ROS: Acuity of condition        Vital Signs for last 24 hours   Temp:  [36.2 °C (97.1 °F)-37.1 °C (98.8 °F)] 36.3 °C (97.3 °F)  Pulse:  [] 121  Resp:  [17-28] 28  BP: ()/(42-85) 96/68  SpO2:  [90 %-97 %] 95 %    Hemodynamic parameters for last 24 hours       Respiratory Information for the last 24 hours       Physical Exam   Physical Exam  Vitals and nursing note reviewed.   Constitutional:       Appearance: She is ill-appearing.   HENT:      Head: Normocephalic and atraumatic.      Comments: violet shade of hair coloring     Nose: Nose normal.      Mouth/Throat:      Mouth: Mucous membranes are moist.      Pharynx: Oropharynx is clear.   Eyes:      Pupils: Pupils are equal, round, and reactive to light.   Neck:      Comments: Tracheostomy in place, on T-piece  Cardiovascular:      Rate and Rhythm: Regular rhythm. Tachycardia present.      Pulses: Normal pulses.      Heart sounds: Normal heart sounds.   Pulmonary:      Breath sounds: Examination of the right-upper field reveals decreased breath sounds. Examination  of the right-middle field reveals decreased breath sounds. Examination of the right-lower field reveals decreased breath sounds. Decreased breath sounds present.      Comments: Diminished breath sounds, right chest tubes x2, no air leak  Abdominal:      Palpations: Abdomen is soft.   Musculoskeletal:         General: Normal range of motion.      Cervical back: Normal range of motion and neck supple.   Skin:     General: Skin is warm and dry.      Capillary Refill: Capillary refill takes less than 2 seconds.   Neurological:      General: No focal deficit present.      Comments: Somnolent.,  Arouses and follows commands, impulsive and sitter at bedside         Medications  Current Facility-Administered Medications   Medication Dose Route Frequency Provider Last Rate Last Admin   • [START ON 5/18/2021] methadone (DOLOPHINE) tablet 30 mg  30 mg Enteral Tube DAILY Juventino Lozano M.D.       • HYDROmorphone (DILAUDID) injection 2 mg  2 mg Intravenous Q2HRS PRN Juventino Lozano M.D.   2 mg at 05/17/21 2027   • dexmedetomidine (PRECEDEX) 400 mcg/100mL NS premix infusion  0.1-1.5 mcg/kg/hr Intravenous Continuous Juventino Lozano M.D.   Held at 05/17/21 1630   • diazePAM (VALIUM) tablet 5 mg  5 mg Enteral Tube QHS PRN Raphael Armstrong M.D.   5 mg at 05/16/21 2206   • metoprolol tartrate (LOPRESSOR) tablet 25 mg  25 mg Enteral Tube TWICE DAILY Raphael Armstrong M.D.   25 mg at 05/17/21 0537   • ondansetron (ZOFRAN) syringe/vial injection 4 mg  4 mg Intravenous Q4HRS PRN Juventino Lozano M.D.   4 mg at 05/13/21 0431   • acetaminophen (TYLENOL) tablet 500 mg  500 mg Enteral Tube TID Juventino Lozano M.D.   500 mg at 05/17/21 2112   • oxyCODONE immediate-release (ROXICODONE) tablet 5 mg  5 mg Enteral Tube Q4HRS PRN Juventino Lozano M.D.       • cefepime (MAXIPIME) 2 g in  mL IVPB  2 g Intravenous Q8HRS Eric Lowery M.D.   Stopped at 05/17/21 0912   • labetalol (NORMODYNE/TRANDATE) injection 10 mg  10 mg  Intravenous Q4HRS PRN Juventino Lozano M.D.   10 mg at 05/04/21 0226   • [Held by provider] enoxaparin (LOVENOX) inj 40 mg  40 mg Subcutaneous DAILY Benoit Loya Jr., D.O.   40 mg at 05/16/21 0509   • Pharmacy Consult: Enteral tube insertion - review meds/change route/product selection  1 Each Other PHARMACY TO DOSE Juventino Lozano M.D.       • acetaminophen (Tylenol) tablet 650 mg  650 mg Enteral Tube Q4HRS PRN Juventino Lozano M.D.   650 mg at 05/12/21 1233   • magnesium hydroxide (MILK OF MAGNESIA) suspension 30 mL  30 mL Enteral Tube QDAY PRN Juventino Lozano M.D.        And   • senna-docusate (PERICOLACE or SENOKOT S) 8.6-50 MG per tablet 2 tablet  2 tablet Enteral Tube BID Juventino Lozano M.D.   2 tablet at 05/17/21 0537    And   • polyethylene glycol/lytes (MIRALAX) PACKET 1 Packet  1 Packet Enteral Tube QDAY PRN Juventino Lozano M.D.   1 Packet at 05/11/21 0525    And   • bisacodyl (DULCOLAX) suppository 10 mg  10 mg Rectal QDAY PRN Juventino Lozano M.D.       • divalproex (DEPAKOTE SPRINKLE) capsule 500 mg  500 mg Enteral Tube Q8HRS Elliott Salvador M.D.   500 mg at 05/17/21 2112   • DULoxetine (CYMBALTA) capsule 60 mg  60 mg Enteral Tube BID Elliott Salvador M.D.   60 mg at 05/17/21 0537   • PARoxetine (PAXIL) tablet 10 mg  10 mg Enteral Tube QAM Elliott Salvador M.D.   10 mg at 05/17/21 0537   • risperiDONE (RISPERDAL) tablet 2 mg  2 mg Enteral Tube BID Elliott Salvador M.D.   2 mg at 05/17/21 0538   • Respiratory Therapy Consult   Nebulization Continuous RT Juventino Lozano M.D.       • ipratropium-albuterol (DUONEB) nebulizer solution  3 mL Nebulization Q2HRS PRN (RT) Juventino Lozano M.D.           Fluids    Intake/Output Summary (Last 24 hours) at 5/17/2021 2130  Last data filed at 5/17/2021 1751  Gross per 24 hour   Intake 520 ml   Output 450 ml   Net 70 ml       Laboratory          Recent Labs     05/15/21  0229 05/16/21  0330 05/17/21  0351   SODIUM 139 139 138   POTASSIUM 3.3*  4.0 3.7   CHLORIDE 105 103 103   CO2 27 28 25   BUN 14 21 18   CREATININE <0.17* 0.22* 0.25*   MAGNESIUM 1.8  --  1.7   PHOSPHORUS 3.8  --  4.4   CALCIUM 8.3* 9.6 9.6     Recent Labs     05/15/21  0229 05/16/21  0330 05/17/21  0351 05/17/21  1511   ALTSGPT  --   --  17  --    ASTSGOT  --   --  13  --    ALKPHOSPHAT  --   --  679*  --    TBILIRUBIN  --   --  0.6  --    PREALBUMIN  --   --   --  17.7*   GLUCOSE 83 110* 91  --      Recent Labs     05/15/21  0229 05/17/21  0351   WBC 6.9 7.0   NEUTSPOLYS  --  69.30   LYMPHOCYTES  --  22.30   MONOCYTES  --  7.60   EOSINOPHILS  --  0.00   BASOPHILS  --  0.40   ASTSGOT  --  13   ALTSGPT  --  17   ALKPHOSPHAT  --  679*   TBILIRUBIN  --  0.6     Recent Labs     05/15/21  0229 05/17/21  0351   RBC 2.99* 3.57*   HEMOGLOBIN 8.8* 10.3*   HEMATOCRIT 28.3* 34.0*   PLATELETCT 306 338   PROTHROMBTM  --  15.7*   INR  --  1.21*       Imaging  X-Ray:  I have personally reviewed the images and compared with prior images.    Assessment/Plan  * Empyema of right pleural space (HCC)- (present on admission)  Assessment & Plan  R empyema due to septic pulmonary emboli  4/16 Chest tube placement at [facility]  4/18 Upsized by Dr Benedict  4/18-4/20 Received TPA/dornase.  Stopped b/c Hgb dropped requiring transfusion  4/26 was discontinued due to stopped functioning   4/28 s/p right VATS and decortication. Cx grew of Klebsiella pneumonia  5/17 total right lung decortication for organized empyema, right chest tubes x2    Debility  Assessment & Plan  PT/OT/SLP eval    Elevated alkaline phosphatase level- (present on admission)  Assessment & Plan  Unchanged --> recheck tomorrow  With dilated biliary ducts on imaging, stable  Unable to get MRCP due to nerve stimulator, ?  Eventual ERCP    Goals of care, counseling/discussion  Assessment & Plan  Palliative consulted  Full code    Spinal cord stimulator status  Assessment & Plan  Patient's stimulator is Nuvectra. It is FDA approved as MRI compatible, but the  company has gone out of business, so there is no support team to assist with MRI.  Thus, if MRI were performed, our radiologists would need to protocol it correctly to manage the stimulator. The former rep from [facility] is Andre, [phone].  Information obtained from Dr. Mahoney's office, [phone].    Per  (5/4/2021), it is not MRI compatible unfortunately.     Pleural effusion, left  Assessment & Plan  Parapneumonic fluid from abscesses  CT guided chest tube placed 4/23  TPA/dornase one dose on 4/24.  Hold d/t dropping Hgb  CT chest 4/25 with resolution of fluid  CT clamped 4/29-no significant accumulation of fluid so d/c 4/30  Monitor for now radiographically    Abnormal movement  Assessment & Plan  Tardive dyskinesia  Monitor with medication management    Atelectasis  Assessment & Plan  Pulmonary toiletry, ambulation    Pneumonia  Assessment & Plan  MSSA septic emboli with empyema   BAL 4/20 klebsiella pneumonia  BAL 4/24 still heavy growth of klebsiella  4/28 OR pleural fluid specimen growing klebsiella.   Continue cefepime per ID    Bacteremia due to methicillin susceptible Staphylococcus aureus (MSSA)- (present on admission)  Assessment & Plan  Source presumed right anterior chest line/port with endocarditis  Multiple sources for MSSA including MV/TV vegetation, port, spinal stimulator/ shunt  Status post port removal at [facility]  Last positive blood cultures were 4/17  Status post treatment with nafcillin from 4/16-4/23  ID following, continue cefepime    Acute bacterial endocarditis- (present on admission)  Assessment & Plan  MSSA bacteremia at OSH. First negative blood cultures were on 4/17  Infected port removed at [facility]   shunt and spinal stimulator could be seeded, NSGY has consulted on both and don't recommend removal at admission due to HD instability.   MV and TV vegetations with septic pulmonary emboli  Nataly ID following, antibiotics escalated to cefepime for VAP?  Follow-up cultures  from decortication 5/17    Prolonged Q-T interval on ECG- (present on admission)  Assessment & Plan  Optimize electrolytes, continuous telemetry monitoring  Avoid QT prolonging medications as able    Acute respiratory failure with hypoxia (HCC)- (present on admission)  Assessment & Plan  4/15 Intubated for acute hypoxic respiratory failure at [facility] due to hydro/pneumo, trapped lung, lung abscesses. 5/1 s/p percutaneous tracheostomy  Continue T-piece  Mild respiratory acidosis postoperatively, continue pulmonary hygiene, follow, positive pressure ventilation if needed    Tachycardia  Assessment & Plan  Persistent, sinus tach  Treat underlying causes    Chronic pain syndrome- (present on admission)  Assessment & Plan  Chronic back pain and intractable headaches  History of nerve stimulator   Dr Vargas consulted at admission, no plan to remove stimulator due to her clinical instability  Multimodal with scheduled oxycodone, gabapentin, methadone (increased dose 5/11)    Anemia  Assessment & Plan  Daily CBC with conservative transfusion strategy    Sepsis (HCC)  Assessment & Plan        Hypomagnesemia  Assessment & Plan  Magnesium 1.8  Replete to keep > 2.0    Hypokalemia  Assessment & Plan  Keep > 4.0    Anxiety- (present on admission)  Assessment & Plan  Improving  Continue Depakote, paxil, cymbalta, gabapentin, clonidine at current doses  Limited as needed benzodiazepines    S/P  shunt- (present on admission)  Assessment & Plan  History of pseudotumor cerebri  History of  shunt by Dr. Oh  Could be seeded by [facility], Dr. Oh consulted at [facility], recommended MRI but unable given her spinal stimulator    Acute encephalopathy- (present on admission)  Assessment & Plan  Improving but impulsive and sitter at bedside for safety  Reorient and maintain sleep/wake cycle, mobilize    DVT prophylaxis: Enoxaparin (held preop)  PUD prophylaxis: Not indicated  Glycemic control: PRN sliding scale insulin  Nutrition: Tube  feeds  Lines: PICC  Lopez: None  Mobility: Early mobility as tolerated  Goals of care: Full code  Disposition: ICU --> med/surg    I have performed a physical exam and reviewed and updated ROS and Plan today (5/17/2021). In review of yesterday's note (5/16/2021), there are no changes except as documented above.     Discussed patient condition and risk of morbidity and/or mortality with RN, RT and Pharmacy   The patient remains critically ill.  Critical care time = 35 minutes in directly providing and coordinating critical care and extensive data review.  No time overlap and excludes procedures.

## 2021-05-18 NOTE — ANESTHESIA POSTPROCEDURE EVALUATION
Patient: Enedelia Pang    Procedure Summary     Date: 05/17/21 Room / Location: [facility] 09 / SURGERY [facility]    Anesthesia Start: 1642 Anesthesia Stop: 1845    Procedures:       THORACOTOMY (Right )      DECORTICATION, LUNG. Diagnosis: (Right empyema)    Surgeons: John H Ganser, M.D. Responsible Provider: Tyree Yang M.D.    Anesthesia Type: general ASA Status: 2          Final Anesthesia Type: general  Last vitals  BP   Blood Pressure: 133/72    Temp   36.2 °C (97.2 °F)    Pulse   98   Resp   18    SpO2   94 %      Anesthesia Post Evaluation    Patient location during evaluation: PACU  Patient participation: complete - patient participated  Level of consciousness: awake  Pain score: 2    Airway patency: patent  Anesthetic complications: no  Cardiovascular status: adequate  Respiratory status: acceptable  Hydration status: acceptable    PONV: none          No complications documented.     Nurse Pain Score: 5 (NPRS)

## 2021-05-18 NOTE — PROGRESS NOTES
Cortrak Placement:   Cortrak placed in right nare at 70 cm. Pt maintaining sats and no coughing. Visualized placement with cortrak machine. Abdominal x-ray pending verification.

## 2021-05-18 NOTE — CARE PLAN
Problem: Pain Management  Goal: Pain level will decrease to patient's comfort goal  5/18/2021 1517 by GUADALUPE SageN.  Outcome: Progressing  5/18/2021 1515 by REGLA Sage.N.  Outcome: Progressing     Problem: Knowledge Deficit - Standard  Goal: Patient and family/care givers will demonstrate understanding of plan of care, disease process/condition, diagnostic tests and medications  5/18/2021 1517 by GUADALUPE SageN.  Outcome: Progressing  5/18/2021 1515 by Berta Castellanos R.N.  Outcome: Progressing   The patient is Unstable - High likelihood or risk of patient condition declining or worsening    Shift Goals  Clinical Goals: decrease heart rate, unclogged cortrak  Patient Goals: pain control    Progress made toward(s) clinical / shift goals:      Patient is not progressing towards the following goals:remains tachy

## 2021-05-18 NOTE — ANESTHESIA TIME REPORT
Anesthesia Start and Stop Event Times     Date Time Event    5/17/2021 1634 Ready for Procedure     1642 Anesthesia Start     1845 Anesthesia Stop        Responsible Staff  05/17/21    Name Role Begin End    Tyree Yang M.D. Anesth 1642 1845        Preop Diagnosis (Free Text):  Pre-op Diagnosis     Right empyema        Preop Diagnosis (Codes):    Post op Diagnosis  Empyema      Premium Reason  A. 3PM - 7AM    Comments:

## 2021-05-18 NOTE — OR NURSING
From PR2 right chest tubes paced to 20 cm suction. Left radial A-Line coorelating with cuff BP. SR . Prevena right chest patent. Purewick in place . Righht IJ trple luman cental line-waiting for xray for placement. Unit PC infusing into Rt ac PICC.

## 2021-05-18 NOTE — CARE PLAN
Problem: Oxygenation:  Goal: Maintain adequate oxygenation dependent on patient condition  Outcome: Progressing   Trach day 18  8.0 Portex cuffed, deflated  Patient receiving 10 L, 40% FIO2 via T-piece

## 2021-05-19 ENCOUNTER — APPOINTMENT (OUTPATIENT)
Dept: RADIOLOGY | Facility: MEDICAL CENTER | Age: 51
DRG: 003 | End: 2021-05-19
Attending: INTERNAL MEDICINE
Payer: MEDICARE

## 2021-05-19 PROBLEM — R57.9 SHOCK (HCC): Status: ACTIVE | Noted: 2021-05-19

## 2021-05-19 LAB
AMMONIA PLAS-SCNC: 21 UMOL/L (ref 11–45)
ANION GAP SERPL CALC-SCNC: 8 MMOL/L (ref 7–16)
BASE EXCESS BLDA CALC-SCNC: 5 MMOL/L (ref -4–3)
BASOPHILS # BLD AUTO: 0.2 % (ref 0–1.8)
BASOPHILS # BLD: 0.03 K/UL (ref 0–0.12)
BLOOD CULTURE HOLD CXBCH: NORMAL
BODY TEMPERATURE: ABNORMAL DEGREES
BUN SERPL-MCNC: 25 MG/DL (ref 8–22)
CA-I SERPL-SCNC: 1.2 MMOL/L (ref 1.1–1.3)
CALCIUM SERPL-MCNC: 8.8 MG/DL (ref 8.5–10.5)
CFT BLD TEG: 7.2 MIN (ref 5–10)
CHLORIDE SERPL-SCNC: 105 MMOL/L (ref 96–112)
CLOT ANGLE BLD TEG: 74.1 DEGREES (ref 53–72)
CLOT LYSIS 30M P MA LENFR BLD TEG: 0.1 % (ref 0–8)
CO2 BLDA-SCNC: 30 MMOL/L (ref 20–33)
CO2 SERPL-SCNC: 25 MMOL/L (ref 20–33)
CORTIS SERPL-MCNC: 20.2 UG/DL (ref 0–23)
CREAT SERPL-MCNC: 0.26 MG/DL (ref 0.5–1.4)
CT.EXTRINSIC BLD ROTEM: 1.1 MIN (ref 1–3)
DELSYS IDSYS: ABNORMAL
EOSINOPHIL # BLD AUTO: 0 K/UL (ref 0–0.51)
EOSINOPHIL NFR BLD: 0 % (ref 0–6.9)
ERYTHROCYTE [DISTWIDTH] IN BLOOD BY AUTOMATED COUNT: 57.1 FL (ref 35.9–50)
ERYTHROCYTE [DISTWIDTH] IN BLOOD BY AUTOMATED COUNT: 59 FL (ref 35.9–50)
ERYTHROCYTE [DISTWIDTH] IN BLOOD BY AUTOMATED COUNT: 60.2 FL (ref 35.9–50)
ERYTHROCYTE [DISTWIDTH] IN BLOOD BY AUTOMATED COUNT: 61.3 FL (ref 35.9–50)
FUNGUS SPEC CULT: NORMAL
GLUCOSE SERPL-MCNC: 124 MG/DL (ref 65–99)
HCO3 BLDA-SCNC: 29 MMOL/L (ref 17–25)
HCT VFR BLD AUTO: 21.4 % (ref 37–47)
HCT VFR BLD AUTO: 22 % (ref 37–47)
HCT VFR BLD AUTO: 22.2 % (ref 37–47)
HCT VFR BLD AUTO: 25 % (ref 37–47)
HGB BLD-MCNC: 6.5 G/DL (ref 12–16)
HGB BLD-MCNC: 6.8 G/DL (ref 12–16)
HGB BLD-MCNC: 7 G/DL (ref 12–16)
HGB BLD-MCNC: 8 G/DL (ref 12–16)
HOROWITZ INDEX BLDA+IHG-RTO: 247 MM[HG]
IMM GRANULOCYTES # BLD AUTO: 0.05 K/UL (ref 0–0.11)
IMM GRANULOCYTES NFR BLD AUTO: 0.4 % (ref 0–0.9)
LACTATE BLD-SCNC: 1 MMOL/L (ref 0.5–2)
LYMPHOCYTES # BLD AUTO: 1.07 K/UL (ref 1–4.8)
LYMPHOCYTES NFR BLD: 8.6 % (ref 22–41)
MCF BLD TEG: 80 MM (ref 50–70)
MCH RBC QN AUTO: 28.8 PG (ref 27–33)
MCH RBC QN AUTO: 29.1 PG (ref 27–33)
MCH RBC QN AUTO: 29.4 PG (ref 27–33)
MCH RBC QN AUTO: 29.7 PG (ref 27–33)
MCHC RBC AUTO-ENTMCNC: 30.4 G/DL (ref 33.6–35)
MCHC RBC AUTO-ENTMCNC: 30.9 G/DL (ref 33.6–35)
MCHC RBC AUTO-ENTMCNC: 31.5 G/DL (ref 33.6–35)
MCHC RBC AUTO-ENTMCNC: 32 G/DL (ref 33.6–35)
MCV RBC AUTO: 91.9 FL (ref 81.4–97.8)
MCV RBC AUTO: 94 FL (ref 81.4–97.8)
MCV RBC AUTO: 94.1 FL (ref 81.4–97.8)
MCV RBC AUTO: 94.7 FL (ref 81.4–97.8)
MODE IMODE: ABNORMAL
MONOCYTES # BLD AUTO: 1.58 K/UL (ref 0–0.85)
MONOCYTES NFR BLD AUTO: 12.7 % (ref 0–13.4)
NEUTROPHILS # BLD AUTO: 9.67 K/UL (ref 2–7.15)
NEUTROPHILS NFR BLD: 78.1 % (ref 44–72)
NRBC # BLD AUTO: 0 K/UL
NRBC BLD-RTO: 0 /100 WBC
O2/TOTAL GAS SETTING VFR VENT: 30 %
PA AA BLD-ACNC: 0 %
PA ADP BLD-ACNC: 35.5 %
PCO2 BLDA: 37.7 MMHG (ref 26–37)
PCO2 TEMP ADJ BLDA: 37.4 MMHG (ref 26–37)
PEEP END EXPIRATORY PRESSURE IPEEP: 8 CMH20
PERCENT MINUTE VOLUME IPMV: 120
PH BLDA: 7.49 [PH] (ref 7.4–7.5)
PH TEMP ADJ BLDA: 7.5 [PH] (ref 7.4–7.5)
PLATELET # BLD AUTO: 285 K/UL (ref 164–446)
PLATELET # BLD AUTO: 291 K/UL (ref 164–446)
PLATELET # BLD AUTO: 349 K/UL (ref 164–446)
PLATELET # BLD AUTO: 357 K/UL (ref 164–446)
PMV BLD AUTO: 9.2 FL (ref 9–12.9)
PMV BLD AUTO: 9.5 FL (ref 9–12.9)
PMV BLD AUTO: 9.6 FL (ref 9–12.9)
PMV BLD AUTO: 9.7 FL (ref 9–12.9)
PO2 BLDA: 74 MMHG (ref 64–87)
PO2 TEMP ADJ BLDA: 73 MMHG (ref 64–87)
POTASSIUM SERPL-SCNC: 3.1 MMOL/L (ref 3.6–5.5)
RBC # BLD AUTO: 2.26 M/UL (ref 4.2–5.4)
RBC # BLD AUTO: 2.34 M/UL (ref 4.2–5.4)
RBC # BLD AUTO: 2.36 M/UL (ref 4.2–5.4)
RBC # BLD AUTO: 2.72 M/UL (ref 4.2–5.4)
SAO2 % BLDA: 96 % (ref 93–99)
SIGNIFICANT IND 70042: NORMAL
SITE SITE: NORMAL
SODIUM SERPL-SCNC: 138 MMOL/L (ref 135–145)
SOURCE SOURCE: NORMAL
SPECIMEN DRAWN FROM PATIENT: ABNORMAL
TEG ALGORITHM TGALG: ABNORMAL
WBC # BLD AUTO: 11.8 K/UL (ref 4.8–10.8)
WBC # BLD AUTO: 12.4 K/UL (ref 4.8–10.8)
WBC # BLD AUTO: 14.5 K/UL (ref 4.8–10.8)
WBC # BLD AUTO: 16.5 K/UL (ref 4.8–10.8)

## 2021-05-19 PROCEDURE — 700102 HCHG RX REV CODE 250 W/ 637 OVERRIDE(OP): Performed by: INTERNAL MEDICINE

## 2021-05-19 PROCEDURE — 36620 INSERTION CATHETER ARTERY: CPT | Performed by: INTERNAL MEDICINE

## 2021-05-19 PROCEDURE — A9270 NON-COVERED ITEM OR SERVICE: HCPCS | Performed by: INTERNAL MEDICINE

## 2021-05-19 PROCEDURE — 87205 SMEAR GRAM STAIN: CPT

## 2021-05-19 PROCEDURE — 85347 COAGULATION TIME ACTIVATED: CPT

## 2021-05-19 PROCEDURE — 86923 COMPATIBILITY TEST ELECTRIC: CPT

## 2021-05-19 PROCEDURE — 71045 X-RAY EXAM CHEST 1 VIEW: CPT

## 2021-05-19 PROCEDURE — 94003 VENT MGMT INPAT SUBQ DAY: CPT

## 2021-05-19 PROCEDURE — 85384 FIBRINOGEN ACTIVITY: CPT

## 2021-05-19 PROCEDURE — 700111 HCHG RX REV CODE 636 W/ 250 OVERRIDE (IP): Performed by: INTERNAL MEDICINE

## 2021-05-19 PROCEDURE — 82140 ASSAY OF AMMONIA: CPT

## 2021-05-19 PROCEDURE — 83605 ASSAY OF LACTIC ACID: CPT

## 2021-05-19 PROCEDURE — 87186 SC STD MICRODIL/AGAR DIL: CPT

## 2021-05-19 PROCEDURE — 85025 COMPLETE CBC W/AUTO DIFF WBC: CPT

## 2021-05-19 PROCEDURE — 85027 COMPLETE CBC AUTOMATED: CPT | Mod: 91

## 2021-05-19 PROCEDURE — 80048 BASIC METABOLIC PNL TOTAL CA: CPT

## 2021-05-19 PROCEDURE — 87070 CULTURE OTHR SPECIMN AEROBIC: CPT

## 2021-05-19 PROCEDURE — 87641 MR-STAPH DNA AMP PROBE: CPT

## 2021-05-19 PROCEDURE — 87040 BLOOD CULTURE FOR BACTERIA: CPT | Mod: 91

## 2021-05-19 PROCEDURE — 82533 TOTAL CORTISOL: CPT

## 2021-05-19 PROCEDURE — A9270 NON-COVERED ITEM OR SERVICE: HCPCS | Performed by: HOSPITALIST

## 2021-05-19 PROCEDURE — 36430 TRANSFUSION BLD/BLD COMPNT: CPT

## 2021-05-19 PROCEDURE — 700102 HCHG RX REV CODE 250 W/ 637 OVERRIDE(OP): Performed by: HOSPITALIST

## 2021-05-19 PROCEDURE — 87077 CULTURE AEROBIC IDENTIFY: CPT

## 2021-05-19 PROCEDURE — 82330 ASSAY OF CALCIUM: CPT

## 2021-05-19 PROCEDURE — 03HY32Z INSERTION OF MONITORING DEVICE INTO UPPER ARTERY, PERCUTANEOUS APPROACH: ICD-10-PCS | Performed by: INTERNAL MEDICINE

## 2021-05-19 PROCEDURE — 99291 CRITICAL CARE FIRST HOUR: CPT | Mod: 25 | Performed by: INTERNAL MEDICINE

## 2021-05-19 PROCEDURE — 94002 VENT MGMT INPAT INIT DAY: CPT

## 2021-05-19 PROCEDURE — 85576 BLOOD PLATELET AGGREGATION: CPT | Mod: 91

## 2021-05-19 PROCEDURE — 82803 BLOOD GASES ANY COMBINATION: CPT

## 2021-05-19 PROCEDURE — 700105 HCHG RX REV CODE 258: Performed by: INTERNAL MEDICINE

## 2021-05-19 PROCEDURE — 36620 INSERTION CATHETER ARTERY: CPT

## 2021-05-19 PROCEDURE — 99292 CRITICAL CARE ADDL 30 MIN: CPT | Mod: 25 | Performed by: INTERNAL MEDICINE

## 2021-05-19 PROCEDURE — 94640 AIRWAY INHALATION TREATMENT: CPT

## 2021-05-19 PROCEDURE — P9016 RBC LEUKOCYTES REDUCED: HCPCS

## 2021-05-19 PROCEDURE — 770022 HCHG ROOM/CARE - ICU (200)

## 2021-05-19 RX ORDER — SODIUM CHLORIDE 9 MG/ML
1000 INJECTION, SOLUTION INTRAVENOUS ONCE
Status: ACTIVE | OUTPATIENT
Start: 2021-05-19 | End: 2021-05-20

## 2021-05-19 RX ORDER — SODIUM CHLORIDE 9 MG/ML
INJECTION, SOLUTION INTRAVENOUS ONCE
Status: DISCONTINUED | OUTPATIENT
Start: 2021-05-19 | End: 2021-05-19

## 2021-05-19 RX ORDER — HYDROMORPHONE HYDROCHLORIDE 2 MG/ML
2 INJECTION, SOLUTION INTRAMUSCULAR; INTRAVENOUS; SUBCUTANEOUS EVERY 4 HOURS PRN
Status: DISCONTINUED | OUTPATIENT
Start: 2021-05-19 | End: 2021-05-22

## 2021-05-19 RX ORDER — LEVETIRACETAM 500 MG/1
500 TABLET ORAL 2 TIMES DAILY
Status: DISCONTINUED | OUTPATIENT
Start: 2021-05-19 | End: 2021-05-26

## 2021-05-19 RX ORDER — FAMOTIDINE 20 MG/1
20 TABLET, FILM COATED ORAL EVERY 12 HOURS
Status: DISCONTINUED | OUTPATIENT
Start: 2021-05-19 | End: 2021-05-22

## 2021-05-19 RX ORDER — FUROSEMIDE 10 MG/ML
40 INJECTION INTRAMUSCULAR; INTRAVENOUS ONCE
Status: COMPLETED | OUTPATIENT
Start: 2021-05-19 | End: 2021-05-19

## 2021-05-19 RX ORDER — SODIUM CHLORIDE 9 MG/ML
INJECTION, SOLUTION INTRAVENOUS CONTINUOUS
Status: DISCONTINUED | OUTPATIENT
Start: 2021-05-19 | End: 2021-05-19

## 2021-05-19 RX ORDER — SODIUM CHLORIDE, SODIUM LACTATE, POTASSIUM CHLORIDE, AND CALCIUM CHLORIDE .6; .31; .03; .02 G/100ML; G/100ML; G/100ML; G/100ML
1000 INJECTION, SOLUTION INTRAVENOUS ONCE
Status: COMPLETED | OUTPATIENT
Start: 2021-05-19 | End: 2021-05-19

## 2021-05-19 RX ORDER — PHENYLEPHRINE HYDROCHLORIDE 10 MG/ML
INJECTION, SOLUTION INTRAMUSCULAR; INTRAVENOUS; SUBCUTANEOUS
Status: COMPLETED
Start: 2021-05-19 | End: 2021-05-19

## 2021-05-19 RX ORDER — MAGNESIUM SULFATE HEPTAHYDRATE 40 MG/ML
2 INJECTION, SOLUTION INTRAVENOUS ONCE
Status: COMPLETED | OUTPATIENT
Start: 2021-05-19 | End: 2021-05-19

## 2021-05-19 RX ADMIN — ACETAMINOPHEN 500 MG: 325 TABLET, FILM COATED ORAL at 15:12

## 2021-05-19 RX ADMIN — ACETAMINOPHEN 500 MG: 325 TABLET, FILM COATED ORAL at 08:05

## 2021-05-19 RX ADMIN — MAGNESIUM SULFATE 2 G: 2 INJECTION INTRAVENOUS at 06:26

## 2021-05-19 RX ADMIN — CEFAZOLIN SODIUM 2 G: 2 INJECTION, SOLUTION INTRAVENOUS at 05:11

## 2021-05-19 RX ADMIN — PHENYLEPHRINE HYDROCHLORIDE 50 MCG/MIN: 10 INJECTION INTRAVENOUS at 08:59

## 2021-05-19 RX ADMIN — DIAZEPAM 5 MG: 5 TABLET ORAL at 21:36

## 2021-05-19 RX ADMIN — DIVALPROEX SODIUM 500 MG: 125 CAPSULE ORAL at 13:59

## 2021-05-19 RX ADMIN — DOCUSATE SODIUM 50 MG AND SENNOSIDES 8.6 MG 2 TABLET: 8.6; 5 TABLET, FILM COATED ORAL at 17:27

## 2021-05-19 RX ADMIN — DOCUSATE SODIUM 50 MG AND SENNOSIDES 8.6 MG 2 TABLET: 8.6; 5 TABLET, FILM COATED ORAL at 05:09

## 2021-05-19 RX ADMIN — HYDROMORPHONE HYDROCHLORIDE 2 MG: 2 INJECTION, SOLUTION INTRAMUSCULAR; INTRAVENOUS; SUBCUTANEOUS at 20:48

## 2021-05-19 RX ADMIN — RISPERIDONE 2 MG: 1 TABLET, FILM COATED ORAL at 05:08

## 2021-05-19 RX ADMIN — GABAPENTIN 300 MG: 300 CAPSULE ORAL at 05:09

## 2021-05-19 RX ADMIN — GABAPENTIN 300 MG: 300 CAPSULE ORAL at 13:59

## 2021-05-19 RX ADMIN — HYDROCORTISONE SODIUM SUCCINATE 50 MG: 100 INJECTION, POWDER, FOR SOLUTION INTRAMUSCULAR; INTRAVENOUS at 12:30

## 2021-05-19 RX ADMIN — METOPROLOL TARTRATE 25 MG: 25 TABLET, FILM COATED ORAL at 05:09

## 2021-05-19 RX ADMIN — FUROSEMIDE 40 MG: 10 INJECTION, SOLUTION INTRAMUSCULAR; INTRAVENOUS at 06:26

## 2021-05-19 RX ADMIN — HYDROMORPHONE HYDROCHLORIDE 2 MG: 2 INJECTION, SOLUTION INTRAMUSCULAR; INTRAVENOUS; SUBCUTANEOUS at 02:48

## 2021-05-19 RX ADMIN — OXYCODONE 5 MG: 5 TABLET ORAL at 20:32

## 2021-05-19 RX ADMIN — LEVETIRACETAM 500 MG: 500 TABLET ORAL at 17:27

## 2021-05-19 RX ADMIN — PHENYLEPHRINE HYDROCHLORIDE 100 MCG/MIN: 10 INJECTION INTRAVENOUS at 15:49

## 2021-05-19 RX ADMIN — RISPERIDONE 2 MG: 1 TABLET, FILM COATED ORAL at 17:27

## 2021-05-19 RX ADMIN — OXYCODONE 5 MG: 5 TABLET ORAL at 01:32

## 2021-05-19 RX ADMIN — SODIUM CHLORIDE: 9 INJECTION, SOLUTION INTRAVENOUS at 09:35

## 2021-05-19 RX ADMIN — DULOXETINE HYDROCHLORIDE 60 MG: 60 CAPSULE, DELAYED RELEASE ORAL at 17:27

## 2021-05-19 RX ADMIN — PAROXETINE HYDROCHLORIDE 10 MG: 20 TABLET, FILM COATED ORAL at 05:09

## 2021-05-19 RX ADMIN — GABAPENTIN 300 MG: 300 CAPSULE ORAL at 21:37

## 2021-05-19 RX ADMIN — PHENYLEPHRINE HYDROCHLORIDE 260 MCG/MIN: 10 INJECTION INTRAVENOUS at 12:31

## 2021-05-19 RX ADMIN — SODIUM CHLORIDE, POTASSIUM CHLORIDE, SODIUM LACTATE AND CALCIUM CHLORIDE 1000 ML: 600; 310; 30; 20 INJECTION, SOLUTION INTRAVENOUS at 10:38

## 2021-05-19 RX ADMIN — POTASSIUM BICARBONATE 75 MEQ: 978 TABLET, EFFERVESCENT ORAL at 06:26

## 2021-05-19 RX ADMIN — FAMOTIDINE 20 MG: 20 TABLET ORAL at 17:27

## 2021-05-19 RX ADMIN — MEROPENEM 1 G: 1 INJECTION, POWDER, FOR SOLUTION INTRAVENOUS at 15:17

## 2021-05-19 RX ADMIN — DULOXETINE HYDROCHLORIDE 60 MG: 60 CAPSULE, DELAYED RELEASE ORAL at 05:09

## 2021-05-19 RX ADMIN — MEROPENEM 1 G: 1 INJECTION, POWDER, FOR SOLUTION INTRAVENOUS at 21:36

## 2021-05-19 RX ADMIN — HYDROCORTISONE SODIUM SUCCINATE 50 MG: 100 INJECTION, POWDER, FOR SOLUTION INTRAMUSCULAR; INTRAVENOUS at 17:28

## 2021-05-19 RX ADMIN — HYDROCORTISONE SODIUM SUCCINATE 100 MG: 100 INJECTION, POWDER, FOR SOLUTION INTRAMUSCULAR; INTRAVENOUS at 10:42

## 2021-05-19 RX ADMIN — DIVALPROEX SODIUM 500 MG: 125 CAPSULE ORAL at 05:08

## 2021-05-19 RX ADMIN — METHADONE HYDROCHLORIDE 30 MG: 10 TABLET ORAL at 05:08

## 2021-05-19 RX ADMIN — ACETAMINOPHEN 500 MG: 325 TABLET, FILM COATED ORAL at 20:32

## 2021-05-19 RX ADMIN — PHENYLEPHRINE HYDROCHLORIDE 50 MCG/MIN: 10 INJECTION INTRAVENOUS at 08:46

## 2021-05-19 ASSESSMENT — PAIN DESCRIPTION - PAIN TYPE
TYPE: ACUTE PAIN;SURGICAL PAIN
TYPE: SURGICAL PAIN;ACUTE PAIN
TYPE: ACUTE PAIN
TYPE: ACUTE PAIN
TYPE: ACUTE PAIN;SURGICAL PAIN
TYPE: SURGICAL PAIN;ACUTE PAIN
TYPE: SURGICAL PAIN;ACUTE PAIN

## 2021-05-19 ASSESSMENT — FIBROSIS 4 INDEX: FIB4 SCORE: 0.53

## 2021-05-19 NOTE — PROCEDURES
Arterial Line Insertion    Date/Time: 5/19/2021 10:40 AM  Performed by: Juventino Lozano M.D.  Authorized by: Juventino Lozano M.D.   Preparation: Patient was prepped and draped in the usual sterile fashion.  Indications: multiple ABGs, respiratory failure and hemodynamic monitoring  Location: right brachial    Sedation:  Patient sedated: yes  Sedation: dilaudid.    Jerman's test normal: no  Needle gauge: 20  Seldinger technique: Seldinger technique used  Number of attempts: 3  Post-procedure: line sutured  Post-procedure CMS: normal  Patient tolerance: patient tolerated the procedure well with no immediate complications  Comments: US guided emergent artline placement for hemodynamic monitoring.

## 2021-05-19 NOTE — PROGRESS NOTES
Patient is lethargic and withdrawn. Patient is also tachycardic and hypotensive. MD Lozano at bedside. New orders received.

## 2021-05-19 NOTE — THERAPY
Missed Therapy     Patient Name: Enedelia Pang  Age:  50 y.o., Sex:  female  Medical Record #: [id number]  Today's Date: 5/19/2021    Discussed missed therapy with RN       05/19/21 1141   Treatment Variance   Reason For Missed Therapy Medical - Patient Is Not Medically Stable   Interdisciplinary Plan of Care Collaboration   Collaboration Comments Attempted to see the patient for placement of speaking valve/PO trials.  Patient tachycardiac and hypotensive this morning and was placed back on the vent.  SLP will hold today and reattempt as the patient is appropriate.

## 2021-05-19 NOTE — CARE PLAN
Problem: Oxygenation:  Goal: Maintain adequate oxygenation dependent on patient condition  5/19/2021 0416 by Pinky Nugent, RRT  Outcome: Progressing     Patient remains on heated aerosol to trach, 10 l/m, 40%. Wean fio2 as tolerated and per MD orders.

## 2021-05-19 NOTE — DISCHARGE PLANNING
Anticipated Discharge Disposition: TBD    Action: Discussed in IDT rounds; pt requiring ICU level of care. Pt back on T piece, awaiting art line insertion by MD.     Barriers to Discharge: ICU level of care  Not medically cleared for transfer to LTAC    Plan: Care coordination will follow and assist with discharge needs.

## 2021-05-19 NOTE — CARE PLAN
Problem: Pain Management  Goal: Pain level will decrease to patient's comfort goal  Outcome: Progressing     Problem: Fall Risk  Goal: Patient will remain free from falls  Outcome: Progressing   The patient is progressing in fall safety. Bed alarm intact at this time.     Shift Goals  Clinical Goals: decrease heart rate, unclogged cortrak  Patient Goals: pain control    Progress made toward(s) clinical / shift goals: Cortrak patent. HR generally unchanged from last several shifts.     Patient is not progressing towards the following goals: n/a

## 2021-05-19 NOTE — THERAPY
Missed Therapy     Patient Name: Enedelia Pang  Age:  50 y.o., Sex:  female  Medical Record #: [id number]  Today's Date: 5/19/2021    Discussed missed therapy with RN    Continue to hold OT. Pt tachycardic/HoTN, put back on the vent otday, lethargic, and with possible bleed, Hgb 7; RN recommended hold today. Will re-attempt as appropriate/able.

## 2021-05-19 NOTE — PROGRESS NOTES
"Critical Care Progress Note    Date of admission  4/16/2021    Chief Complaint  \"50 y.o. female the past medical history of pseudotumor cerebri status post  shunt by Dr. Oh, chronic pain with history of placement of nerve stimulator, vasculitis, right anterior chest port for home IV meds with recurrent infection who presented 4/11/2021 with to [facility] with recurrent port infection. Found to have MSSA bacteremia, endocarditis, septic pulmonary emboli/empyema/pneumatocele, and CSF pleocytosis concerning for  shunt involvement.  Seen by Dr. Oh, NSGY, at [facility] who did not recommend  shunt removal.  Seen by Dr. Vargas here for concern of fluid collection around her spinal stimulator and he recommended against removal. Remains intubated, encephalopathic.\"  5/17:Critical care signing back on tonight after total right lung decortication for organized empyema  She was recovered in PACU, and is now on T-piece.  Follow-up ABG showed mild respiratory acidosis  She has 2 right chest tubes with marked improvement in aeration of right lung postoperatively, no pneumothorax on CXR  Minimize sedation, dexmedetomidine tonight if needed, pulmonary hygiene, follow-up arterial blood gas, positive airway pressure if needed     Hospital Course  4/16 admitted to ICU  4/17 VD 2, 2 FFP, pRBC overnight; new chest tube per gen surg  4/18 VD 3, TPa/Dornase with 1400 cc from R chest tube  4/26 R chest tube not functioning, d/c it.  broke his leg and going to OR today, so not available currently.  VD 11.  8/30%. RSBI immediately high on SBT attempt. Followed commands for RN  4/27 VD12. 8/30%. Not tolerating wean, high RSBI and tachycardic.   4/28 VD 13. VATS with Dr. Ganser.    4/29 VD 14. 8/30%. Still not tolerating wean d/t severe tachycardia just with SAT. Trialed precedex and she required fentanyl up to 600/hr to tolerate, so back on propofol. Chest tubes -40  4/30 VD 15. 8/30%. Try ketamine/versed as sedative. Severe " tachycardia and HTN with weaning down propofol. Plan for perc trach tomorrow. Chest tubes -40  5/1 perc trach. PICC placed. Chest tubes remain -40.  5/2 Able to spont well on sedation, but very tachy/HTN when sedation is turned down.  5/3 midazolam weaned down to 3mg/min  5/4 off versed and ketamine gtt  5/5 trials of SBT w/ trach  5/6 T piece trial during day, mobilize to chair x 2, rest vent 8/8 at night, started methadone, gabapentin   5/7 T piece during day with, rest PS 8/8 overnight, spacing out dilaudid PRNs  5/8 T-piece during day, still with anxiety weaning dex gtt  5/10 CT chest  5/11 will require thoracotomy and decortication  5/12 off ventilator x 24 hours  5/13 replete magnesium, remains on T-piece, ambulating, transfer to med/surg  5/17 -right thoracotomy with total lung decortication, remains on T-piece postoperatively did well  5/18 on T piece but with increase tachycardia 2l IV fluids, increase pain regime, lethargic just asking for dilaudid    Overnight events/rouding:  Agitated overnight tachycardia  Neuro: lethargic no pushing call bell or dilaudid, squeeze hands   HR: 110-140's  SBP: 60-80's saqib at 100mcg  Tmax: afebrile  GI: TF at goal, BM 5/15  UOP: voiding incontinent purwick  Lines: right IJ triple, picc line, 2 right CT's 90ml. 100ml  Resp: trach asv 120% peep 8, thick secretions  Vte: d/c  PPI/H2:held  Antibx: ancef, repeat blood cultures    Lasix 40mg IV for edema seen on CXR  klyte 75meq, mag 2g IV  ASV with peep 8  CBC q8, teg w/ mapping, ical, hold lovenox  Repeat CXR 4pm   Ammonia level  1prbc given  neosynephrine gtt for map >60  LR 1l   Blood cx, lactate, sputum cx, cortisol  Hydrocortisone 100mg then 50mg q6  LR wide open  Discussed with ID increase to meropenem  ABG pending  Dee placed  Saqib at 230      Review of Systems  Review of Systems   Unable to perform ROS: Acuity of condition        Vital Signs for last 24 hours   Temp:  [36.1 °C (97 °F)-37.2 °C (98.9 °F)] 36.8 °C (98.3  °F)  Pulse:  [] 95  Resp:  [17-44] 19  BP: ()/(37-75) 111/58  SpO2:  [84 %-100 %] 100 %    Hemodynamic parameters for last 24 hours       Respiratory Information for the last 24 hours  Vent Mode: ASV  PEEP/CPAP: 8  MAP: 9.4    Physical Exam   Physical Exam  Vitals and nursing note reviewed.   Constitutional:       General: She is not in acute distress.     Appearance: She is ill-appearing. She is not toxic-appearing or diaphoretic.      Comments: Sitting up in bed chronic ill appearing lethargic, pale   HENT:      Head: Normocephalic and atraumatic.      Comments: Purple hair     Nose: Nose normal.      Comments: cortrac     Mouth/Throat:      Mouth: Mucous membranes are moist.      Pharynx: Oropharynx is clear.   Eyes:      Pupils: Pupils are equal, round, and reactive to light.   Neck:      Comments: Tracheostomy in place, on T-piece  Cardiovascular:      Rate and Rhythm: Regular rhythm. Tachycardia present.      Pulses: Normal pulses.      Heart sounds: Normal heart sounds.   Pulmonary:      Comments: Coarse mechanical breath bilateral, CT x2 right chest with wound vac in place  Abdominal:      General: There is no distension.      Palpations: Abdomen is soft. There is no mass.      Tenderness: There is no abdominal tenderness. There is no guarding or rebound.      Hernia: No hernia is present.   Musculoskeletal:         General: No swelling or tenderness. Normal range of motion.      Cervical back: Normal range of motion and neck supple.   Skin:     General: Skin is warm and dry.      Capillary Refill: Capillary refill takes less than 2 seconds.      Coloration: Skin is pale. Skin is not jaundiced.      Findings: No bruising or erythema.   Neurological:      General: No focal deficit present.      Comments: Somnolent/lethargic, follows commands, moves all extremities         Medications  Current Facility-Administered Medications   Medication Dose Route Frequency Provider Last Rate Last Admin   • NS  infusion   Intravenous Continuous Juventino Lozano M.D. 30 mL/hr at 05/19/21 0935 New Bag at 05/19/21 0935   • phenylephrine (VEDA-SYNEPHRINE) 40 mg in  mL Infusion  0-300 mcg/min Intravenous Continuous Juventino Lozano M.D. 86.3 mL/hr at 05/19/21 1337 230 mcg/min at 05/19/21 1337   • HYDROmorphone (DILAUDID) injection 2 mg  2 mg Intravenous Q4HRS PRN Juventino Lozano M.D.       • hydrocortisone sodium succinate PF (SOLU-CORTEF) 100 MG injection 50 mg  50 mg Intravenous Q6HRS Juventino Lozano M.D.   50 mg at 05/19/21 1230   • NS infusion 1,000 mL  1,000 mL Intravenous Once Juventino Lozano M.D.       • meropenem (MERREM) 1 g in  mL IVPB  1,000 mg Intravenous Q6HRS Dixie Albarran M.D.       • gabapentin (NEURONTIN) capsule 300 mg  300 mg Enteral Tube Q8HRS Juventino Lozano M.D.   300 mg at 05/19/21 0509   • methadone (DOLOPHINE) tablet 30 mg  30 mg Enteral Tube DAILY Juventino Lozano M.D.   30 mg at 05/19/21 0508   • diazePAM (VALIUM) tablet 5 mg  5 mg Enteral Tube QHS PRN Raphael Armstrong M.D.   5 mg at 05/18/21 2234   • ondansetron (ZOFRAN) syringe/vial injection 4 mg  4 mg Intravenous Q4HRS PRN Juventino Lozano M.D.   4 mg at 05/13/21 0431   • acetaminophen (TYLENOL) tablet 500 mg  500 mg Enteral Tube TID Juventino Lozano M.D.   500 mg at 05/19/21 0805   • oxyCODONE immediate-release (ROXICODONE) tablet 5 mg  5 mg Enteral Tube Q4HRS PRN Juventino Lozano M.D.   5 mg at 05/19/21 0132   • labetalol (NORMODYNE/TRANDATE) injection 10 mg  10 mg Intravenous Q4HRS PRN Juventino Lozano M.D.   10 mg at 05/04/21 0226   • Pharmacy Consult: Enteral tube insertion - review meds/change route/product selection  1 Each Other PHARMACY TO DOSE Juventino Lozano M.D.       • acetaminophen (Tylenol) tablet 650 mg  650 mg Enteral Tube Q4HRS PRN Juventino Lozano M.D.   650 mg at 05/12/21 1233   • magnesium hydroxide (MILK OF MAGNESIA) suspension 30 mL  30 mL Enteral Tube QDAY PRN Juventino Lozano M.D.         And   • senna-docusate (PERICOLACE or SENOKOT S) 8.6-50 MG per tablet 2 tablet  2 tablet Enteral Tube BID Juventino Lozano M.D.   2 tablet at 05/19/21 0509    And   • polyethylene glycol/lytes (MIRALAX) PACKET 1 Packet  1 Packet Enteral Tube QDAY PRN Juventino Lozano M.D.   1 Packet at 05/11/21 0525    And   • bisacodyl (DULCOLAX) suppository 10 mg  10 mg Rectal QDAY PRN Juventino Lozano M.D.       • divalproex (DEPAKOTE SPRINKLE) capsule 500 mg  500 mg Enteral Tube Q8HRS Elliott Salvador M.D.   500 mg at 05/19/21 0508   • DULoxetine (CYMBALTA) capsule 60 mg  60 mg Enteral Tube BID Elliott Salvador M.D.   60 mg at 05/19/21 0509   • PARoxetine (PAXIL) tablet 10 mg  10 mg Enteral Tube QAM Elliott Salvador M.D.   10 mg at 05/19/21 0509   • risperiDONE (RISPERDAL) tablet 2 mg  2 mg Enteral Tube BID Elliott Salvador M.D.   2 mg at 05/19/21 0508   • Respiratory Therapy Consult   Nebulization Continuous RT Juventino Lozano M.D.       • ipratropium-albuterol (DUONEB) nebulizer solution  3 mL Nebulization Q2HRS PRN (RT) Juventino Lozano M.D.           Fluids    Intake/Output Summary (Last 24 hours) at 5/19/2021 1348  Last data filed at 5/19/2021 1230  Gross per 24 hour   Intake 5112.18 ml   Output 500 ml   Net 4612.18 ml       Laboratory  Recent Labs     05/17/21  2134 05/18/21  1303 05/19/21  1043   ISTATAPH 7.304* 7.403 7.494   ISTATAPCO2 51.1* 36.8 37.7*   ISTATAPO2 89* 58* 74   ISTATATCO2 27 24 30   NWHBSRT2JDH 96 90* 96   ISTATARTHCO3 25.4* 23.0 29.0*   ISTATARTBE -1 -2 5*   ISTATTEMP 97.3 F 98.6 F 98.3 F   ISTATFIO2 40 40 30   ISTATSPEC Arterial Arterial Arterial   ISTATAPHTC 7.314* 7.403 7.497   LASUNLXS5DF 85 58* 73         Recent Labs     05/17/21  0351 05/17/21  0351 05/17/21  2310 05/18/21  0910 05/19/21  0300   SODIUM 138   < > 138 139 138   POTASSIUM 3.7   < > 3.7 4.2 3.1*   CHLORIDE 103   < > 103 105 105   CO2 25   < > 24 24 25   BUN 18   < > 18 21 25*   CREATININE 0.25*   < > 0.24* 0.33* 0.26*    MAGNESIUM 1.7  --  1.7 1.6  --    PHOSPHORUS 4.4  --  5.5* 4.4  --    CALCIUM 9.6   < > 9.0 9.3 8.8    < > = values in this interval not displayed.     Recent Labs     05/17/21 0351 05/17/21 0351 05/17/21  1511 05/17/21 2310 05/18/21  0910 05/19/21  0300   ALTSGPT 17  --   --   --   --   --    ASTSGOT 13  --   --   --   --   --    ALKPHOSPHAT 679*  --   --   --   --   --    TBILIRUBIN 0.6  --   --   --   --   --    PREALBUMIN  --   --  17.7*  --   --   --    GLUCOSE 91   < >  --  119* 108* 124*    < > = values in this interval not displayed.     Recent Labs     05/17/21 0351 05/17/21 2310 05/18/21  1400 05/19/21  0300 05/19/21  0910   WBC 7.0   < > 15.1* 12.4* 14.5*   NEUTSPOLYS 69.30  --   --  78.10*  --    LYMPHOCYTES 22.30  --   --  8.60*  --    MONOCYTES 7.60  --   --  12.70  --    EOSINOPHILS 0.00  --   --  0.00  --    BASOPHILS 0.40  --   --  0.20  --    ASTSGOT 13  --   --   --   --    ALTSGPT 17  --   --   --   --    ALKPHOSPHAT 679*  --   --   --   --    TBILIRUBIN 0.6  --   --   --   --     < > = values in this interval not displayed.     Recent Labs     05/17/21 0351 05/17/21 2310 05/18/21  1400 05/19/21  0300 05/19/21  0910   RBC 3.57*   < > 3.12* 2.36* 2.26*   HEMOGLOBIN 10.3*   < > 9.1* 7.0* 6.5*   HEMATOCRIT 34.0*   < > 29.7* 22.2* 21.4*   PLATELETCT 338   < > 335 285 357   PROTHROMBTM 15.7*  --   --   --   --    INR 1.21*  --   --   --   --     < > = values in this interval not displayed.       Imaging  X-Ray:  I have personally reviewed the images and compared with prior images.    Assessment/Plan  * Empyema of right pleural space (HCC)- (present on admission)  Assessment & Plan  R empyema due to septic pulmonary emboli  4/16 Chest tube placement at [facility]  4/18 Upsized by Dr Benedict  4/18-4/20 Received TPA/dornase.  Stopped b/c Hgb dropped requiring transfusion  4/26 was discontinued due to stopped functioning   4/28 s/p right VATS and decortication. Cx grew of Klebsiella pneumonia  5/17 total  right lung decortication for organized empyema, right chest tubes x2    Shock (HCC)  Assessment & Plan  Undifferentiated acute am of 5/19   Fluid bolus, PRBC blood cultures, sputum, ID follow with broadening antibiotics  Lactate normal  Hg 6.5 will serial monitor hg, No coagulopathy  emperic rx with stress dose steroids and cortisol pending  Passive leg raise unremarkable  Worsening infiltrates and sputum on right lung place on ventilator    Debility  Assessment & Plan  PT/OT/SLP eval    Elevated alkaline phosphatase level- (present on admission)  Assessment & Plan  Unchanged --> recheck tomorrow  With dilated biliary ducts on imaging, stable  Unable to get MRCP due to nerve stimulator, ?  Eventual ERCP    Goals of care, counseling/discussion  Assessment & Plan  Palliative consulted  Full code    Spinal cord stimulator status  Assessment & Plan  Patient's stimulator is Nuvectra. It is FDA approved as MRI compatible, but the company has gone out of business, so there is no support team to assist with MRI.  Thus, if MRI were performed, our radiologists would need to protocol it correctly to manage the stimulator. The former rep from [employer] is Andre, [phone].  Information obtained from Dr. Mahoney's office, [phone].    Per  (5/4/2021), it is not MRI compatible unfortunately.     Anasarca  Assessment & Plan  Secondary to chronic critical illness  Optimize nutrition and volume status    Pleural effusion, left  Assessment & Plan  Parapneumonic fluid from abscesses  CT guided chest tube placed 4/23  TPA/dornase one dose on 4/24.  Hold d/t dropping Hgb  CT chest 4/25 with resolution of fluid  CT clamped 4/29-no significant accumulation of fluid so d/c 4/30  Monitor for now radiographically    Pneumonia  Assessment & Plan  MSSA septic emboli with empyema   BAL 4/20 klebsiella pneumonia  BAL 4/24 still heavy growth of klebsiella  4/28 OR pleural fluid specimen growing klebsiella.   Continue cefepime per  ID    Bacteremia due to methicillin susceptible Staphylococcus aureus (MSSA)- (present on admission)  Assessment & Plan  Source presumed right anterior chest line/port with endocarditis  Multiple sources for MSSA including MV/TV vegetation, port, spinal stimulator/ shunt  Status post port removal at [facility]  Last positive blood cultures were 4/17  Status post treatment with nafcillin from 4/16-4/23  ID following, continue cefepime    Acute bacterial endocarditis- (present on admission)  Assessment & Plan  MSSA bacteremia at OSH. First negative blood cultures were on 4/17  Infected port removed at [facility]   shunt and spinal stimulator could be seeded, NSGY has consulted on both and don't recommend removal at admission due to HD instability.   MV and TV vegetations with septic pulmonary emboli  Nataly ID following, antibiotics escalated to cefepime for VAP and klebsiella on prior pleural fluid  Follow-up cultures from decortication 5/17    Prolonged Q-T interval on ECG- (present on admission)  Assessment & Plan  Optimize electrolytes, continuous telemetry monitoring  Avoid QT prolonging medications as able    Acute respiratory failure with hypoxia (HCC)- (present on admission)  Assessment & Plan  4/15 Intubated for acute hypoxic respiratory failure at [facility] due to hydro/pneumo, trapped lung, lung abscesses. 5/1 s/p percutaneous tracheostomy  Place back on ventilator for peep and recruitment and rest with her acute shock 5/19 gas exchange good  Monitor timing of attempt t-piece trials      Tachycardia  Assessment & Plan  Persistent, sinus tach  Treat underlying causes    Chronic pain syndrome- (present on admission)  Assessment & Plan  Chronic back pain and intractable headaches  History of nerve stimulator   Dr Vargas consulted at admission, no plan to remove stimulator due to her clinical instability  Multimodal with scheduled oxycodone, gabapentin, methadone (increased dose 5/11)    Anemia  Assessment & Plan  Daily  CBC with conservative transfusion strategy  5/19 transfusion 1 prbc   Continue to monitor for bleeding in right lung CT's are minimal    Sepsis (HCC)  Assessment & Plan        Hypomagnesemia  Assessment & Plan  Magnesium 1.8  Replete to keep > 2.0    Hypokalemia  Assessment & Plan  Keep > 4.0    Anxiety- (present on admission)  Assessment & Plan  Improving  Continue Depakote, paxil, cymbalta, gabapentin  Limited as needed benzodiazepines    S/P  shunt- (present on admission)  Assessment & Plan  History of pseudotumor cerebri  History of  shunt by Dr. Oh  Could be seeded by [facility], Dr. Oh consulted at [facility], recommended MRI but unable given her spinal stimulator    Acute encephalopathy- (present on admission)  Assessment & Plan  Reorient and maintain sleep/wake cycle, mobilize  Limit sedative when able  Difficult to treat anxiety and acute/chronic pain    I have performed a physical exam and reviewed and updated ROS and Plan today (5/19/2021). In review of yesterday's note (5/18/2021), there are no changes except as documented above.     Discussed patient condition and risk of morbidity and/or mortality with Hospitalist, RN, RT, Pharmacy, Charge nurse / hot rounds and Patient      Patient remains in critical condition from needing ventilator support and acute blood transfusion and pressor with active titration. Critical care time provided was 90 minutes. This excludes all separate billable procedures.

## 2021-05-19 NOTE — THERAPY
Contact Therapy Note    Patient Name: Enedelia Pang  Age:  50 y.o., Sex:  female  Medical Record #: [id number]  Today's Date: 5/19/2021 05/19/21 1400   Treatment Variance   Reason For Missed Therapy Medical - Patient Is Not Medically Stable   Interdisciplinary Plan of Care Collaboration   IDT Collaboration with  Nursing;Occupational Therapist   Collaboration Comments Patient not medically stable at this time for PT follow up session. Patient reintubated, tachycardic and hypotensive. Will follow up when appropriate.

## 2021-05-19 NOTE — PROGRESS NOTES
Infectious Disease Daily Progress Note    Date of Service  5/19/2021    Chief Complaint      Cefazolin: 5/18 to present.  Cefepime 4/23 5/18-  s/p 23 days     Thoracoscopy & Decortication - 4/28  Decortication 5/17     PRIOR Rx:   Zosyn 4/22-4/23  Previous: Unasyn, Zosyn, Vanco, Daptomycin  Ceftaroline: start 4/13-4/15  Nafcillin 2g Q4 hrs 4/15-4/23 4/14: Unable to give name of previous NSG or neurologist. Seems confused, but able to say some sentences fluently.   4/15: Line cultures now growing MSSA. As well as from the blood. Repeat blood culture 4/13+ in both sets. Patient has worsening confusion. CSF fluid analyses suggest pleocytosis. Cultures are no growth from the CSF. Chest CT was ordered this morning indicating a loculated right pleural effusion as well as bilateral septic emboli. Dr. Mack spoke with Dr. Oh yesterday and no surgical intervention unless clinical deterioration.  4/16: Increased respiratory distress.  Tachypneic.  CT with loculated collection.  Dr Resendiz not available.  No thoracic surgeon available.  Plans to have pulmonary place CT.  Transferring to ICU.  4/17: Transferred to [facility] last night. Hgb dropped to 6.4 requiring PRBC transfusion. Requiring 2 pressors. Fio2 50%. Febrile 38.8. Pending OR decortication of empyema and hemothorax. Chest tube placed at [facility] shows 8,371 WBC, ,000, 74% N  4/18: Chest tube was upsized by surgery yesterday, no decortication. WBC 22k. Fio2 40%. Vasopressin. Levophed down to 2mcg. Afebrile 24 hrs. Last fever 38.6 on 4/16.   4/19: Still on vent. Levo 3 mcg, vasopressin. Afebrile 72 hours. WBC 21k. BC 4/17 NGTD x 48 hours. Unable to do MRI brain given neurostimulator per RN.   4/20: Remaining afebrile. Hb 6.2 and undergoing transfusion today.WBC 24,100, 5% bands.1700 ml CT output. Copious bloody ET secretions. No pressors. Receiving dornase and TPA per CT. Right pleural effusion not large per CXR today.   4/21: WBC 31k. Bronchoscopy yesterday  showing blood. Cultures sent. TPA on hold per RN due to arvind blood from chest tube requiring PRBC transfusion for hgb 6. Pending chest CT today. Per , spinal stimulator is non-MRI compatible. Levophed 10mcg. 30% Fio2. Afebrile.   4/22: Fever 101.3 this am. WBC 20,300 down from 32,200 yesterday. BAL growing Klebsiella pneumoniae but susceptibility panel not set up until today. CTA of brain shows no lesion. CT of chest shows enlarging cavitary lung lesions bilat and large loculated new left pleural effusion and large hydropneumothorax on right. TPA & dornase infusions of right chest ended 4/20 after considerable bleeding requiring transfusion. Hb now down again to 6.8.  4/23: WBC 23k. Fio2 40%. Tmax 38.1. US of stiumlator shows small fluid collection but no drainable abscess. Pending MICAH today. Pending L chest tube placement  4/24: Continue fever 100.8-101.8. WBC 16,600. MICAH shows large vegetations on both tricuspid valve and mitral valve with mild TR & MR.  No aortic root abscess. Left chest tube placed yesterday yielding 8 ml of old bloody fluid. Bronch today revealed copious thick maroon secretions in distal trachea and both mainstem bronchi. On the right these were obstructive. Remaining off pressors. Last positive blood cultures (MSSA) were 4/16, negative 4/17.  4/25: Fever 100.6 this AM. wBC 13,300. Hb 6.6. CT: right hydropneumothorax increasing, right bronchopleural fistula, mediastinal shift ot the left , multiple cavitary pulmonary nodules, 3.1 cm left basilar mass, splenomegaly. CT of head shows dural thickening over the clivus. Lac+ GNR >100,000 col/ml growing from BAL of 4/24, presumed Klebsiella. Left peural fluid culture negative from 4/23.  4/26: Fever 100.4 last night. WBC 13,500. Hb 7.4. Plts 502,000. ESR >120. UA fairly clear except 30 mg% protein, 2-5 wbc and rare rbc. CXR unchanged except more prominent pulmonary edema. GNR in BAL may be a different species of Klebsiella, and resistant to  ampicillin & tmp-sulfa; confirmation pending.  4/28: Fever 100.8 last night. WBC 27,700. Hb 6.6. Plts 482,000. Creat 0.19. Kleb pneum in BAL on 4/24 is resistant to ampicillin & tmp-sulfa but otherwise sensitive. O2 sat 96% on FIO2 30% now, PEEP 8. Has been re-evaluated by thoracic surgeon, Dr. Ganser, who believes she will not wean without decortication on the right. Surgery anticipated today.  4/29: S/P right thoracoscopy with decortication with cultures NG at 24 hrs.  4/30: Had a bronch due to hypoxia and hypotension. CXR with worsening right hemithorax pulmonary opacities. Bloody mucous plugs removed. Pressors started. Febrile this am. Tachy in 130s. Pressors just removed. Tolerating vent, but not a SBT candidate at the moment.  5/1: Small air leak CT-2 every ~ 2/3 breathes. The K. pneumoniae from her thoracoscopy is sensitive to her cefepime so no antibiotic change needed.   5/2: No air leak today she tolerated 2  hrs of spontaneous breathing with support today.      5/3: Second day with SBT and doing well now for 2 hrs. Slight bump in temperature and      WBC's but no obvious clinical infectious changed so watch closely.      5/4: Fever still from time to time. WBCs trending up. Chest tubes in place. Trach.       5/5: She clearly is better today she was sitting up in bed with open eyes and follows commands. She still has low grade fever but her WBC's are dropping. Cefepime dose increased yesterday for increased CNS coverage if necessary. She will probably need further thoracic surgery as mentioned by Dr. Ganser. The issue of what to do with her stimulator is still up in the air for now as it probably will need to be removed but she is nort a candidate for more major surgery at this time.       5/7: She continues to improve and under go longer SBT trials. She just had a new PICC placed in her right arm and plan is to D/C central line.        5/8: PICC placed. No fevers. Comfortable. Cortrak placed. Failing  SBTs.  5/9: No acute issues. No fevers. Comfortable. Family at bedside.   5/10: No acute issues, fevers or WBC bumps. No changes in condition. She is to get her CT scan today and be reviewed by surgery  5/11:  at bedside.  Has been referred to LANE.  Repeat CT scan completed.  Results noted.  ? If Dr Ganser has seen.  Still with an empyema:  ? If candidate for further surgery.  5/12: Pending next thoracic surgery. No fever. Anxious.  5/13: ? Surgery date.  No one seems to know.  Had speaking valve in and having swallow eval with speech therapy  5/14: No acute issues. No fevers. Surgery Monday.  5/15: One CT accidentally pulled out yesterday.  Has been on T piece and tolerating. Plans for surgery Monday afternoon.  5/16: On schedule for surgery 5/17: 4:30 pm.  Moved to room T614.  No new issues.  5/17: Was sitting up in the bedside chair since change of shift.  Now walking in hallways with RN and CNA.  5/18: Decortication yesterday with 2 chest tube placement. WBC improving 21->14k  5/19: Hypotension and tachycardia.  Decreased H/H.  Placed back on vent to rest.  Dr Lozano in process of placing new line.  Antibiotics were deesculated yesterday to Cefazolin.  No cx were taken at surgery.  Review of Systems  Review of Systems   Reason unable to perform ROS: intubated.        Physical Exam  Temp:  [36.1 °C (97 °F)-37.2 °C (98.9 °F)] 36.8 °C (98.3 °F)  Pulse:  [100-220] 100  Resp:  [17-44] 18  BP: ()/(37-75) 78/42  SpO2:  [84 %-98 %] 98 %    Physical Exam  Constitutional:       Appearance: Normal appearance.   HENT:      Head:      Comments: NG tube  Cardiovascular:      Rate and Rhythm: Tachycardia present.   Pulmonary:      Comments: Back on the vent.  Chest tubes in place.  Skin:     Comments: R sided chest tubes x 2 with serosanguinous fluid   Neurological:      Mental Status: She is alert and oriented to person, place, and time.   Psychiatric:         Mood and Affect: Mood normal.          Fluids    Intake/Output Summary (Last 24 hours) at 5/19/2021 1019  Last data filed at 5/19/2021 0626  Gross per 24 hour   Intake 2920 ml   Output 460 ml   Net 2460 ml       Laboratory  Recent Labs     05/18/21  1400 05/19/21  0300 05/19/21  0910   WBC 15.1* 12.4* 14.5*   RBC 3.12* 2.36* 2.26*   HEMOGLOBIN 9.1* 7.0* 6.5*   HEMATOCRIT 29.7* 22.2* 21.4*   MCV 95.2 94.1 94.7   MCH 29.2 29.7 28.8   MCHC 30.6* 31.5* 30.4*   RDW 61.4* 60.2* 61.3*   PLATELETCT 335 285 357   MPV 9.5 9.5 9.7     Recent Labs     05/17/21  2310 05/18/21  0910 05/19/21  0300   SODIUM 138 139 138   POTASSIUM 3.7 4.2 3.1*   CHLORIDE 103 105 105   CO2 24 24 25   GLUCOSE 119* 108* 124*   BUN 18 21 25*   CREATININE 0.24* 0.33* 0.26*   CALCIUM 9.0 9.3 8.8     Recent Labs     05/17/21  0351   INR 1.21*                Impression   -Severe sepsis  -Catheter related bloodstream infection due to infected port status post removal 4/12-staph aureus  -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       - Acute left empyema s/p chest tube placement 4/23, decortication 5/17 - Klebsiella  -Multiple acute septic pulmonary emboli bilaterally  -Acute bilateral pneumonia due to above     --Pulmonary edema  -Pseudotumor cerebri with underlying  shunt: possible arachnoiditis  -Chronic migraine headaches  -History of autoimmune vasculitis  -Elevated alkaline phosphatase & bilirubin  -Acute encephalopathy due to sepsis      - anemia due to above       Plan   S/p decortication.  No cultures obtained at last surgery.  Continue to treat Klebsiella empyema and MSSA TV/MV endocarditis  continue 4 weeks beyond last decortication  Patient was initially on cefepime for Klebsiella but more importanty for CNS penetration given concern for ventriculitis and need for CNS coverage.   Now with hypotension and anemia.  Still work up to find out why H/H has  dropped.  No obvious sign of bleeding.    - Current abx covering CNS and back hardware.           - This HW is not coming out anytime soon per NS. Will need suppression therapy afterward targeting MSSA (keflex)    Will esculate antibiotics back.  Had been on Cefepime x 23 days.  Previous cx with only MSSA and Klebsiella.   Will place on Meropenem pending new evaluation     Discussed with  Dr Lozano.  > 40 min on floor in ICU with > 50% face to face view and 100% in direct patient care/counseling/ modifying antibiotics.

## 2021-05-19 NOTE — ASSESSMENT & PLAN NOTE
Undifferentiated acute am of 5/19 - improved/resolved  Fluid bolus, PRBC blood cultures, sputum, ID follow with broadening antibiotics  Lactate normal  Hg 6.5 initially, will serial monitor hg, No coagulopathy  emperic rx with stress dose steroids and cortisol ok  Passive leg raise unremarkable

## 2021-05-19 NOTE — PROGRESS NOTES
"John Jackson MD and padma BROWN rounded on pt. Made them aware her engagement remains low, pt somewhat lethargic still. Let them know she did mobilize to bedside commode, and unable to assess orientation currently, as only participation pt is willing to do at this point is to attempt to write \"dilaudid\". Treating pain as ordered and encouraging engagement/rest as appropriate.   "

## 2021-05-20 ENCOUNTER — APPOINTMENT (OUTPATIENT)
Dept: RADIOLOGY | Facility: MEDICAL CENTER | Age: 51
DRG: 003 | End: 2021-05-20
Attending: INTERNAL MEDICINE
Payer: MEDICARE

## 2021-05-20 LAB
ANION GAP SERPL CALC-SCNC: 6 MMOL/L (ref 7–16)
BASE EXCESS BLDA CALC-SCNC: 3 MMOL/L (ref -4–3)
BASOPHILS # BLD AUTO: 0.1 % (ref 0–1.8)
BASOPHILS # BLD: 0.01 K/UL (ref 0–0.12)
BODY TEMPERATURE: ABNORMAL DEGREES
BUN SERPL-MCNC: 26 MG/DL (ref 8–22)
CALCIUM SERPL-MCNC: 8.4 MG/DL (ref 8.5–10.5)
CHLORIDE SERPL-SCNC: 109 MMOL/L (ref 96–112)
CO2 BLDA-SCNC: 29 MMOL/L (ref 20–33)
CO2 SERPL-SCNC: 28 MMOL/L (ref 20–33)
CREAT SERPL-MCNC: 0.18 MG/DL (ref 0.5–1.4)
DELSYS IDSYS: ABNORMAL
END TIDAL CARBON DIOXIDE IECO2: 39 MMHG
EOSINOPHIL # BLD AUTO: 0.01 K/UL (ref 0–0.51)
EOSINOPHIL NFR BLD: 0.1 % (ref 0–6.9)
ERYTHROCYTE [DISTWIDTH] IN BLOOD BY AUTOMATED COUNT: 56.6 FL (ref 35.9–50)
ERYTHROCYTE [DISTWIDTH] IN BLOOD BY AUTOMATED COUNT: 56.6 FL (ref 35.9–50)
GLUCOSE SERPL-MCNC: 105 MG/DL (ref 65–99)
GRAM STN SPEC: NORMAL
HCO3 BLDA-SCNC: 27.6 MMOL/L (ref 17–25)
HCT VFR BLD AUTO: 21.7 % (ref 37–47)
HCT VFR BLD AUTO: 24.2 % (ref 37–47)
HGB BLD-MCNC: 6.9 G/DL (ref 12–16)
HGB BLD-MCNC: 7.6 G/DL (ref 12–16)
HOROWITZ INDEX BLDA+IHG-RTO: 247 MM[HG]
IMM GRANULOCYTES # BLD AUTO: 0.06 K/UL (ref 0–0.11)
IMM GRANULOCYTES NFR BLD AUTO: 0.6 % (ref 0–0.9)
LYMPHOCYTES # BLD AUTO: 1.49 K/UL (ref 1–4.8)
LYMPHOCYTES NFR BLD: 15.6 % (ref 22–41)
MAGNESIUM SERPL-MCNC: 1.7 MG/DL (ref 1.5–2.5)
MCH RBC QN AUTO: 29.3 PG (ref 27–33)
MCH RBC QN AUTO: 29.9 PG (ref 27–33)
MCHC RBC AUTO-ENTMCNC: 31.4 G/DL (ref 33.6–35)
MCHC RBC AUTO-ENTMCNC: 31.8 G/DL (ref 33.6–35)
MCV RBC AUTO: 93.4 FL (ref 81.4–97.8)
MCV RBC AUTO: 93.9 FL (ref 81.4–97.8)
MODE IMODE: ABNORMAL
MONOCYTES # BLD AUTO: 1.04 K/UL (ref 0–0.85)
MONOCYTES NFR BLD AUTO: 10.9 % (ref 0–13.4)
MRSA DNA SPEC QL NAA+PROBE: NORMAL
NEUTROPHILS # BLD AUTO: 6.95 K/UL (ref 2–7.15)
NEUTROPHILS NFR BLD: 72.7 % (ref 44–72)
NRBC # BLD AUTO: 0 K/UL
NRBC BLD-RTO: 0 /100 WBC
O2/TOTAL GAS SETTING VFR VENT: 30 %
PCO2 BLDA: 39 MMHG (ref 26–37)
PCO2 TEMP ADJ BLDA: 38.4 MMHG (ref 26–37)
PEEP END EXPIRATORY PRESSURE IPEEP: 8 CMH20
PERCENT MINUTE VOLUME IPMV: 120
PH BLDA: 7.46 [PH] (ref 7.4–7.5)
PH TEMP ADJ BLDA: 7.46 [PH] (ref 7.4–7.5)
PHOSPHATE SERPL-MCNC: 2.2 MG/DL (ref 2.5–4.5)
PLATELET # BLD AUTO: 246 K/UL (ref 164–446)
PLATELET # BLD AUTO: 262 K/UL (ref 164–446)
PMV BLD AUTO: 9.5 FL (ref 9–12.9)
PMV BLD AUTO: 9.9 FL (ref 9–12.9)
PO2 BLDA: 74 MMHG (ref 64–87)
PO2 TEMP ADJ BLDA: 73 MMHG (ref 64–87)
POTASSIUM SERPL-SCNC: 3.8 MMOL/L (ref 3.6–5.5)
RBC # BLD AUTO: 2.31 M/UL (ref 4.2–5.4)
RBC # BLD AUTO: 2.59 M/UL (ref 4.2–5.4)
SAO2 % BLDA: 95 % (ref 93–99)
SIGNIFICANT IND 70042: NORMAL
SIGNIFICANT IND 70042: NORMAL
SITE SITE: NORMAL
SITE SITE: NORMAL
SODIUM SERPL-SCNC: 143 MMOL/L (ref 135–145)
SOURCE SOURCE: NORMAL
SOURCE SOURCE: NORMAL
SPECIMEN DRAWN FROM PATIENT: ABNORMAL
WBC # BLD AUTO: 6.9 K/UL (ref 4.8–10.8)
WBC # BLD AUTO: 9.6 K/UL (ref 4.8–10.8)

## 2021-05-20 PROCEDURE — 36600 WITHDRAWAL OF ARTERIAL BLOOD: CPT

## 2021-05-20 PROCEDURE — 700102 HCHG RX REV CODE 250 W/ 637 OVERRIDE(OP): Performed by: INTERNAL MEDICINE

## 2021-05-20 PROCEDURE — 71045 X-RAY EXAM CHEST 1 VIEW: CPT

## 2021-05-20 PROCEDURE — A9270 NON-COVERED ITEM OR SERVICE: HCPCS | Performed by: INTERNAL MEDICINE

## 2021-05-20 PROCEDURE — 86923 COMPATIBILITY TEST ELECTRIC: CPT

## 2021-05-20 PROCEDURE — 770022 HCHG ROOM/CARE - ICU (200)

## 2021-05-20 PROCEDURE — 85025 COMPLETE CBC W/AUTO DIFF WBC: CPT

## 2021-05-20 PROCEDURE — 94003 VENT MGMT INPAT SUBQ DAY: CPT

## 2021-05-20 PROCEDURE — 36430 TRANSFUSION BLD/BLD COMPNT: CPT

## 2021-05-20 PROCEDURE — 700101 HCHG RX REV CODE 250: Performed by: PSYCHIATRY & NEUROLOGY

## 2021-05-20 PROCEDURE — 82803 BLOOD GASES ANY COMBINATION: CPT

## 2021-05-20 PROCEDURE — 700105 HCHG RX REV CODE 258: Performed by: PSYCHIATRY & NEUROLOGY

## 2021-05-20 PROCEDURE — 99291 CRITICAL CARE FIRST HOUR: CPT | Performed by: INTERNAL MEDICINE

## 2021-05-20 PROCEDURE — 700105 HCHG RX REV CODE 258: Performed by: INTERNAL MEDICINE

## 2021-05-20 PROCEDURE — 94799 UNLISTED PULMONARY SVC/PX: CPT

## 2021-05-20 PROCEDURE — 94150 VITAL CAPACITY TEST: CPT

## 2021-05-20 PROCEDURE — 80048 BASIC METABOLIC PNL TOTAL CA: CPT

## 2021-05-20 PROCEDURE — 700101 HCHG RX REV CODE 250: Performed by: INTERNAL MEDICINE

## 2021-05-20 PROCEDURE — 94640 AIRWAY INHALATION TREATMENT: CPT

## 2021-05-20 PROCEDURE — 85027 COMPLETE CBC AUTOMATED: CPT

## 2021-05-20 PROCEDURE — 84100 ASSAY OF PHOSPHORUS: CPT

## 2021-05-20 PROCEDURE — 700111 HCHG RX REV CODE 636 W/ 250 OVERRIDE (IP): Performed by: INTERNAL MEDICINE

## 2021-05-20 PROCEDURE — 83735 ASSAY OF MAGNESIUM: CPT

## 2021-05-20 PROCEDURE — P9016 RBC LEUKOCYTES REDUCED: HCPCS

## 2021-05-20 RX ORDER — DEXMEDETOMIDINE HYDROCHLORIDE 4 UG/ML
.1-1.5 INJECTION, SOLUTION INTRAVENOUS CONTINUOUS
Status: DISCONTINUED | OUTPATIENT
Start: 2021-05-20 | End: 2021-05-22

## 2021-05-20 RX ORDER — MAGNESIUM SULFATE HEPTAHYDRATE 40 MG/ML
2 INJECTION, SOLUTION INTRAVENOUS ONCE
Status: COMPLETED | OUTPATIENT
Start: 2021-05-20 | End: 2021-05-20

## 2021-05-20 RX ADMIN — PAROXETINE HYDROCHLORIDE 10 MG: 20 TABLET, FILM COATED ORAL at 05:06

## 2021-05-20 RX ADMIN — GABAPENTIN 300 MG: 300 CAPSULE ORAL at 22:00

## 2021-05-20 RX ADMIN — ACETAMINOPHEN 500 MG: 325 TABLET, FILM COATED ORAL at 15:17

## 2021-05-20 RX ADMIN — LEVETIRACETAM 500 MG: 500 TABLET ORAL at 05:06

## 2021-05-20 RX ADMIN — GABAPENTIN 300 MG: 300 CAPSULE ORAL at 15:17

## 2021-05-20 RX ADMIN — MAGNESIUM SULFATE 2 G: 2 INJECTION INTRAVENOUS at 09:05

## 2021-05-20 RX ADMIN — DOCUSATE SODIUM 50 MG AND SENNOSIDES 8.6 MG 2 TABLET: 8.6; 5 TABLET, FILM COATED ORAL at 05:06

## 2021-05-20 RX ADMIN — GABAPENTIN 300 MG: 300 CAPSULE ORAL at 05:06

## 2021-05-20 RX ADMIN — DULOXETINE HYDROCHLORIDE 60 MG: 60 CAPSULE, DELAYED RELEASE ORAL at 05:06

## 2021-05-20 RX ADMIN — MEROPENEM 1 G: 1 INJECTION, POWDER, FOR SOLUTION INTRAVENOUS at 22:00

## 2021-05-20 RX ADMIN — HYDROMORPHONE HYDROCHLORIDE 2 MG: 2 INJECTION, SOLUTION INTRAMUSCULAR; INTRAVENOUS; SUBCUTANEOUS at 17:10

## 2021-05-20 RX ADMIN — POTASSIUM PHOSPHATE, MONOBASIC AND POTASSIUM PHOSPHATE, DIBASIC 15 MMOL: 224; 236 INJECTION, SOLUTION, CONCENTRATE INTRAVENOUS at 10:13

## 2021-05-20 RX ADMIN — DEXMEDETOMIDINE 0.4 MCG/KG/HR: 200 INJECTION, SOLUTION INTRAVENOUS at 20:48

## 2021-05-20 RX ADMIN — RISPERIDONE 2 MG: 1 TABLET, FILM COATED ORAL at 17:11

## 2021-05-20 RX ADMIN — DULOXETINE HYDROCHLORIDE 60 MG: 60 CAPSULE, DELAYED RELEASE ORAL at 17:11

## 2021-05-20 RX ADMIN — DEXMEDETOMIDINE 0.2 MCG/KG/HR: 200 INJECTION, SOLUTION INTRAVENOUS at 05:30

## 2021-05-20 RX ADMIN — MEROPENEM 1 G: 1 INJECTION, POWDER, FOR SOLUTION INTRAVENOUS at 15:16

## 2021-05-20 RX ADMIN — ACETAMINOPHEN 500 MG: 325 TABLET, FILM COATED ORAL at 09:04

## 2021-05-20 RX ADMIN — POLYETHYLENE GLYCOL 3350 1 PACKET: 17 POWDER, FOR SOLUTION ORAL at 05:05

## 2021-05-20 RX ADMIN — LEVETIRACETAM 500 MG: 500 TABLET ORAL at 17:10

## 2021-05-20 RX ADMIN — RISPERIDONE 2 MG: 1 TABLET, FILM COATED ORAL at 05:05

## 2021-05-20 RX ADMIN — FAMOTIDINE 20 MG: 20 TABLET ORAL at 05:05

## 2021-05-20 RX ADMIN — HYDROMORPHONE HYDROCHLORIDE 2 MG: 2 INJECTION, SOLUTION INTRAMUSCULAR; INTRAVENOUS; SUBCUTANEOUS at 12:19

## 2021-05-20 RX ADMIN — MEROPENEM 1 G: 1 INJECTION, POWDER, FOR SOLUTION INTRAVENOUS at 05:04

## 2021-05-20 RX ADMIN — HYDROMORPHONE HYDROCHLORIDE 2 MG: 2 INJECTION, SOLUTION INTRAMUSCULAR; INTRAVENOUS; SUBCUTANEOUS at 22:06

## 2021-05-20 RX ADMIN — HYDROMORPHONE HYDROCHLORIDE 2 MG: 2 INJECTION, SOLUTION INTRAMUSCULAR; INTRAVENOUS; SUBCUTANEOUS at 02:30

## 2021-05-20 RX ADMIN — ACETAMINOPHEN 500 MG: 325 TABLET, FILM COATED ORAL at 22:00

## 2021-05-20 RX ADMIN — METHADONE HYDROCHLORIDE 30 MG: 10 TABLET ORAL at 05:05

## 2021-05-20 RX ADMIN — FAMOTIDINE 20 MG: 20 TABLET ORAL at 17:11

## 2021-05-20 RX ADMIN — DOCUSATE SODIUM 50 MG AND SENNOSIDES 8.6 MG 2 TABLET: 8.6; 5 TABLET, FILM COATED ORAL at 17:11

## 2021-05-20 ASSESSMENT — PAIN DESCRIPTION - PAIN TYPE
TYPE: ACUTE PAIN;SURGICAL PAIN
TYPE: SURGICAL PAIN;ACUTE PAIN
TYPE: ACUTE PAIN;SURGICAL PAIN
TYPE: CHRONIC PAIN

## 2021-05-20 ASSESSMENT — PULMONARY FUNCTION TESTS
FVC: .7
FVC: 0

## 2021-05-20 NOTE — PROGRESS NOTES
Spiritual Care Note    Patient Information     Patient's Name: Enedelia Pang   MRN: [id number]    YOB: 1970   Age and Gender: 50 y.o. female   Service Area: CARDIAC ICU [facility]   Room (and Bed): T[facility]   Ethnicity or Nationality:     Primary Language: English   Anglican/Spiritual preference: Anglican   Place of Residence: [place]   Family/Friends/Others Present: no   Clinical Team Present: RT, RN   Medical Diagnosis(-es)/Procedure(s): Empyema   Code Status: Full Code    Date of Admission: 4/16/2021   Length of Stay: 34 days        Spiritual Care Provider Information:  Name of Spiritual Care Provider: Alyx Burnett  Title of Spiritual Care Provider: Associate   Phone Number: [phone]  E-mail: [email or web address]  Total time : 15 minutes    Spiritual Screen Results:    Palliative Care  PC Anglican/Spiritual Screening  Is your spiritual health or inner well-being important to you as you cope with your medical condition?: Yes  Would you like to receive a visit from our Spiritual Care team or your own Judaism or spiritual leader?: Yes  PC Sprituality screening comment: Benoit Olguin, is a retired Anglican        Encounter/Request Information  Encounter/Request Type   Visited With: Patient, Health care provider  Nature of the Visit: Follow-up, On shift  Continue Visiting: Yes  Next Follow-up Date: 05/21/21  Crisis Visit: Critical care  Referral From/ Origin of Request: Verbal staff  Referral To: Other /SCP    Religous Needs/Values  Anglican Needs Visit    Anglican Needs: Prayer    Spiritual Assessment   Spiritual Care Encounters    Observations/Symptoms: Other (Comment) (Pt trached, awake/alert, nodding head in communication)  Interacton/Conversation:  f/u with pt per room change after recent surgery.  Pt nonverbal, awake, nodding head for 'yes' responses.  Pt confirmed prayer ok.  No family BS.  Pt's eyes were opened -  prayed  with pt - pt gave smile when  commented on her 'great purple hair'.  Pt was resting, eyes closed when  left room. Pt gave nod 'yes' when  said they would f/u later  with another visit.    Assessment: Need    Need: Seeking Spiritual Assistance and Support    Distress: Coping    Intended Effects: Mary Affirmation, Demonstrate Caring and Concern    Interventions: Compassionate Presence, Therapeutic Touch, Prayer    Outcomes: Spiritual Comfort (Pt seemed relaxed, comforted)    Plan: Weekly Visits    Notes:

## 2021-05-20 NOTE — CARE PLAN
Ventilator Daily Summary    Vent Day # 2 Trach day 20    Ventilator settings changed this shift: None /8/30%    Weaning trials:Yes     Respiratory Procedures: None    Plan: Continue current ventilator settings and wean mechanical ventilation as tolerated per physician orders.

## 2021-05-20 NOTE — CARE PLAN
Problem: Pain Management  Goal: Pain level will decrease to patient's comfort goal  Note: Educated patient on nonpharmacologic pain management. Assessing pain frequently. Administering scheduled and PRN medications as needed. Reassessing for efficacy.      Problem: Knowledge Deficit - Standard  Goal: Patient and family/care givers will demonstrate understanding of plan of care, disease process/condition, diagnostic tests and medications  Note: Educated patient on plan of care. Explained medications, procedures and tests performed.

## 2021-05-20 NOTE — PROGRESS NOTES
"Critical Care Progress Note    Date of admission  4/16/2021    Chief Complaint  \"50 y.o. female the past medical history of pseudotumor cerebri status post  shunt by Dr. Oh, chronic pain with history of placement of nerve stimulator, vasculitis, right anterior chest port for home IV meds with recurrent infection who presented 4/11/2021 with to [facility] with recurrent port infection. Found to have MSSA bacteremia, endocarditis, septic pulmonary emboli/empyema/pneumatocele, and CSF pleocytosis concerning for  shunt involvement.  Seen by Dr. Oh, NSGY, at [facility] who did not recommend  shunt removal.  Seen by Dr. Vargas here for concern of fluid collection around her spinal stimulator and he recommended against removal. Remains intubated, encephalopathic.\"  5/17:Critical care signing back on tonight after total right lung decortication for organized empyema  She was recovered in PACU, and is now on T-piece.  Follow-up ABG showed mild respiratory acidosis  She has 2 right chest tubes with marked improvement in aeration of right lung postoperatively, no pneumothorax on CXR  Minimize sedation, dexmedetomidine tonight if needed, pulmonary hygiene, follow-up arterial blood gas, positive airway pressure if needed     Hospital Course  4/16 admitted to ICU  4/17 VD 2, 2 FFP, pRBC overnight; new chest tube per gen surg  4/18 VD 3, TPa/Dornase with 1400 cc from R chest tube  4/26 R chest tube not functioning, d/c it.  broke his leg and going to OR today, so not available currently.  VD 11.  8/30%. RSBI immediately high on SBT attempt. Followed commands for RN  4/27 VD12. 8/30%. Not tolerating wean, high RSBI and tachycardic.   4/28 VD 13. VATS with Dr. Ganser.    4/29 VD 14. 8/30%. Still not tolerating wean d/t severe tachycardia just with SAT. Trialed precedex and she required fentanyl up to 600/hr to tolerate, so back on propofol. Chest tubes -40  4/30 VD 15. 8/30%. Try ketamine/versed as sedative. Severe " tachycardia and HTN with weaning down propofol. Plan for perc trach tomorrow. Chest tubes -40  5/1 perc trach. PICC placed. Chest tubes remain -40.  5/2 Able to spont well on sedation, but very tachy/HTN when sedation is turned down.  5/3 midazolam weaned down to 3mg/min  5/4 off versed and ketamine gtt  5/5 trials of SBT w/ trach  5/6 T piece trial during day, mobilize to chair x 2, rest vent 8/8 at night, started methadone, gabapentin   5/7 T piece during day with, rest PS 8/8 overnight, spacing out dilaudid PRNs  5/8 T-piece during day, still with anxiety weaning dex gtt  5/10 CT chest  5/11 will require thoracotomy and decortication  5/12 off ventilator x 24 hours  5/13 replete magnesium, remains on T-piece, ambulating, transfer to med/surg  5/17 -right thoracotomy with total lung decortication, remains on T-piece postoperatively did well  5/18 on T piece but with increase tachycardia 2l IV fluids, increase pain regime, lethargic just asking for dilaudid  4/19 am shock, bolused fluid and gave blood, place on vent and checked multiple labs, stress dose steroids, inc antibiotic coverage re-cultured, ill appearing, was on pressor and febrile    Overnight events/rouding:  Agitated overnight tachycardia  Off pressor at 1230 am  Neuro: follows commands, dex 0.4, dec pain pill request  HR: 80-90's  SBP: 's  Tmax: 99.7  GI: no BM, TF at goal  UOP: good   Lines: central line, picc, CTx2, leslie  Resp: trach 20 vent day 2 8.0 cortrac SBT 5/8 50% now, peep 8 asv 120's Vte: held  PPI/H2:pepcid  Antibx: meropenem, pending new cx data  1 prbc    CT 15ml total b/w both CT  Discuss with Dr Vasquez at bedside  Klebsiella in sputum    Still feels pain is poorly controlled less anxious    Review of Systems  Review of Systems   Unable to perform ROS: Intubated        Vital Signs for last 24 hours   Temp:  [36.3 °C (97.4 °F)-38.2 °C (100.8 °F)] 36.3 °C (97.4 °F)  Pulse:  [] 81  Resp:  [8-35] 15  BP: ()/(39-77)  96/50  SpO2:  [94 %-100 %] 96 %    Hemodynamic parameters for last 24 hours       Respiratory Information for the last 24 hours  Vent Mode: ASV  Vt Target (mL): 300  PEEP/CPAP: 8  MAP: 20  Control VTE (exp VT): 462    Physical Exam   Physical Exam  Vitals and nursing note reviewed.   Constitutional:       General: She is not in acute distress.     Appearance: She is ill-appearing. She is not toxic-appearing or diaphoretic.      Comments: Laying in bed chronic ill appearing lethargic, pale   HENT:      Head: Normocephalic and atraumatic.      Comments: Purple hair     Nose: Nose normal.      Comments: cortrac     Mouth/Throat:      Mouth: Mucous membranes are moist.      Pharynx: Oropharynx is clear.   Eyes:      Pupils: Pupils are equal, round, and reactive to light.   Neck:      Comments: Tracheostomy in place on vent  Cardiovascular:      Rate and Rhythm: Regular rhythm. Tachycardia present.      Pulses: Normal pulses.      Heart sounds: Normal heart sounds.   Pulmonary:      Comments: Coarse mechanical breath bilateral R>L, CT x2 right chest with wound vac in place no redness around CT or vac  Abdominal:      General: There is no distension.      Palpations: Abdomen is soft. There is no mass.      Tenderness: There is no abdominal tenderness. There is no guarding or rebound.      Hernia: No hernia is present.   Musculoskeletal:         General: No swelling or tenderness. Normal range of motion.      Cervical back: Normal range of motion and neck supple.   Skin:     General: Skin is warm and dry.      Capillary Refill: Capillary refill takes less than 2 seconds.      Coloration: Skin is pale. Skin is not jaundiced.      Findings: No bruising or erythema.   Neurological:      General: No focal deficit present.      Comments: Still lethargic but improved, follows commands, moves all extremities         Medications  Current Facility-Administered Medications   Medication Dose Route Frequency Provider Last Rate Last  Admin   • dexmedetomidine (PRECEDEX) 400 mcg/100mL NS premix infusion  0.1-1.5 mcg/kg/hr Intravenous Continuous Sergio Mireles M.D. 6.3 mL/hr at 05/20/21 0545 0.4 mcg/kg/hr at 05/20/21 0545   • alteplase (CATHFLO) syringe *Central Line Only* 2 mg  2 mg Intracatheter Once Sergio Mireles M.D.       • magnesium sulfate IVPB premix 2 g  2 g Intravenous Once Juventino Lozano M.D. 25 mL/hr at 05/20/21 0905 2 g at 05/20/21 0905   • potassium phosphate 15 mmol in dextrose 5% 250 mL ivpb  15 mmol Intravenous Once Juventino Lozano M.D. 62.5 mL/hr at 05/20/21 1013 15 mmol at 05/20/21 1013   • phenylephrine (VEDA-SYNEPHRINE) 40 mg in  mL Infusion  0-300 mcg/min Intravenous Continuous Juventino Lozano M.D.   Stopped at 05/20/21 0018   • HYDROmorphone (DILAUDID) injection 2 mg  2 mg Intravenous Q4HRS PRN Juventino Lozano M.D.   2 mg at 05/20/21 0230   • NS infusion 1,000 mL  1,000 mL Intravenous Once Juventino Lozano M.D.       • meropenem (MERREM) 1 g in  mL IVPB  1,000 mg Intravenous Q8HRS Dixie Albarran M.D.   Stopped at 05/20/21 0534   • Respiratory Therapy Consult   Nebulization Continuous RT Juventino Lozano M.D.       • famotidine (PEPCID) tablet 20 mg  20 mg Enteral Tube Q12HRS Juventino Lozano M.D.   20 mg at 05/20/21 0505    Or   • famotidine (PEPCID) injection 20 mg  20 mg Intravenous Q12HRS Juventino Lozano M.D.       • MD Alert...ICU Electrolyte Replacement per Pharmacy   Other PHARMACY TO DOSE Juventino Lozano M.D.       • lidocaine (XYLOCAINE) 1 % injection 1-2 mL  1-2 mL Tracheal Tube Q30 MIN PRN Juventino Lozano M.D.       • levETIRAcetam (KEPPRA) tablet 500 mg  500 mg Enteral Tube BID Juventino Lozano M.D.   500 mg at 05/20/21 0506   • gabapentin (NEURONTIN) capsule 300 mg  300 mg Enteral Tube Q8HRS Juventino Lozano M.D.   300 mg at 05/20/21 0506   • methadone (DOLOPHINE) tablet 30 mg  30 mg Enteral Tube DAILY Juventino Lozano M.D.   30 mg at 05/20/21 0505   • diazePAM (VALIUM) tablet 5  mg  5 mg Enteral Tube QHS PRN Raphael Armstrong M.D.   5 mg at 05/19/21 2136   • ondansetron (ZOFRAN) syringe/vial injection 4 mg  4 mg Intravenous Q4HRS PRN Juventino Lozano M.D.   4 mg at 05/13/21 0431   • acetaminophen (TYLENOL) tablet 500 mg  500 mg Enteral Tube TID Juventino Lozano M.D.   500 mg at 05/20/21 0904   • oxyCODONE immediate-release (ROXICODONE) tablet 5 mg  5 mg Enteral Tube Q4HRS PRN Juventino Lozano M.D.   5 mg at 05/19/21 2032   • labetalol (NORMODYNE/TRANDATE) injection 10 mg  10 mg Intravenous Q4HRS PRN Juventino Lozano M.D.   10 mg at 05/04/21 0226   • Pharmacy Consult: Enteral tube insertion - review meds/change route/product selection  1 Each Other PHARMACY TO DOSE Juventino Lozano M.D.       • acetaminophen (Tylenol) tablet 650 mg  650 mg Enteral Tube Q4HRS PRN Juventino Lozano M.D.   650 mg at 05/12/21 1233   • magnesium hydroxide (MILK OF MAGNESIA) suspension 30 mL  30 mL Enteral Tube QDAY PRELISE Lozano M.D.        And   • senna-docusate (PERICOLACE or SENOKOT S) 8.6-50 MG per tablet 2 tablet  2 tablet Enteral Tube BID Juventino Lozano M.D.   2 tablet at 05/20/21 0506    And   • polyethylene glycol/lytes (MIRALAX) PACKET 1 Packet  1 Packet Enteral Tube QDAY PRN Juventino Lozano M.D.   1 Packet at 05/20/21 0505    And   • bisacodyl (DULCOLAX) suppository 10 mg  10 mg Rectal QDAY PRN Juventino Lozano M.D.       • DULoxetine (CYMBALTA) capsule 60 mg  60 mg Enteral Tube BID Elliott Salvador M.D.   60 mg at 05/20/21 0506   • PARoxetine (PAXIL) tablet 10 mg  10 mg Enteral Tube QAM Elliott Salvador M.D.   10 mg at 05/20/21 0506   • risperiDONE (RISPERDAL) tablet 2 mg  2 mg Enteral Tube BID Elliott Salvador M.D.   2 mg at 05/20/21 0505   • ipratropium-albuterol (DUONEB) nebulizer solution  3 mL Nebulization Q2HRS PRN (RT) Juventino Lozano M.D.           Fluids    Intake/Output Summary (Last 24 hours) at 5/20/2021 1018  Last data filed at 5/20/2021 0800  Gross per 24 hour    Intake 4011.68 ml   Output 415 ml   Net 3596.68 ml       Laboratory  Recent Labs     05/18/21  1303 05/19/21  1043 05/20/21  0702   ISTATAPH 7.403 7.494 7.458   ISTATAPCO2 36.8 37.7* 39.0*   ISTATAPO2 58* 74 74   ISTATATCO2 24 30 29   [id number] 90* 96 95   ISTATARTHCO3 23.0 29.0* 27.6*   ISTATARTBE -2 5* 3   ISTATTEMP 98.6 F 98.3 F 98.0 F   ISTATFIO2 40 30 30   ISTATSPEC Arterial Arterial Arterial   ISTATAPHTC 7.403 7.497 7.463   IYXEJRBT7BD 58* 73 73         Recent Labs     05/17/21 2310 05/17/21  2310 05/18/21  0910 05/19/21  0300 05/20/21  0550   SODIUM 138   < > 139 138 143   POTASSIUM 3.7   < > 4.2 3.1* 3.8   CHLORIDE 103   < > 105 105 109   CO2 24   < > 24 25 28   BUN 18   < > 21 25* 26*   CREATININE 0.24*   < > 0.33* 0.26* 0.18*   MAGNESIUM 1.7  --  1.6  --  1.7   PHOSPHORUS 5.5*  --  4.4  --  2.2*   CALCIUM 9.0   < > 9.3 8.8 8.4*    < > = values in this interval not displayed.     Recent Labs     05/17/21  1511 05/17/21 2310 05/18/21  0910 05/19/21  0300 05/20/21  0550   PREALBUMIN 17.7*  --   --   --   --    GLUCOSE  --    < > 108* 124* 105*    < > = values in this interval not displayed.     Recent Labs     05/19/21  0300 05/19/21  0910 05/19/21  1400 05/19/21 2312 05/20/21  0550   WBC 12.4*   < > 16.5* 11.8* 9.6   NEUTSPOLYS 78.10*  --   --   --  72.70*   LYMPHOCYTES 8.60*  --   --   --  15.60*   MONOCYTES 12.70  --   --   --  10.90   EOSINOPHILS 0.00  --   --   --  0.10   BASOPHILS 0.20  --   --   --  0.10    < > = values in this interval not displayed.     Recent Labs     05/19/21  1400 05/19/21  2312 05/20/21  0550   RBC 2.72* 2.34* 2.59*   HEMOGLOBIN 8.0* 6.8* 7.6*   HEMATOCRIT 25.0* 22.0* 24.2*   PLATELETCT 349 291 262       Imaging  X-Ray:  I have personally reviewed the images and compared with prior images.    Assessment/Plan  * Empyema of right pleural space (HCC)- (present on admission)  Assessment & Plan  R empyema due to septic pulmonary emboli  4/16 Chest tube placement at [facility]  4/18  Upsized by Dr Benedict  4/18-4/20 Received TPA/dornase.  Stopped b/c Hgb dropped requiring transfusion  4/26 was discontinued due to stopped functioning   4/28 s/p right VATS and decortication. Cx grew of Klebsiella pneumonia  5/17 total right lung decortication for organized empyema, right chest tubes x2    Bacteremia due to methicillin susceptible Staphylococcus aureus (MSSA)- (present on admission)  Assessment & Plan  Source presumed right anterior chest line/port with endocarditis  Multiple sources for MSSA including MV/TV vegetation, port, spinal stimulator/ shunt  Status post port removal at [facility]  Last positive blood cultures were 4/17  Status post treatment with nafcillin from 4/16-4/23  ID following, continue cefepime    Acute bacterial endocarditis- (present on admission)  Assessment & Plan  MSSA bacteremia at OSH. First negative blood cultures were on 4/17  Infected port removed at [facility]   shunt and spinal stimulator could be seeded, NSGY has consulted on both and don't recommend removal at admission due to HD instability.   MV and TV vegetations with septic pulmonary emboli  Nataly ID following, antibiotics escalated to cefepime for VAP and klebsiella on prior pleural fluid  Follow-up cultures from decortication 5/17    Acute respiratory failure with hypoxia (HCC)- (present on admission)  Assessment & Plan  4/15 Intubated for acute hypoxic respiratory failure at [facility] due to hydro/pneumo, trapped lung, lung abscesses. 5/1 s/p percutaneous tracheostomy  Place back on ventilator for peep and recruitment and rest with her acute shock 5/19 gas exchange good  Monitor timing of attempt t-piece trials      Sepsis (HCC)  Assessment & Plan        Acute encephalopathy- (present on admission)  Assessment & Plan  Reorient and maintain sleep/wake cycle, mobilize  Limit sedative when able  Difficult to treat anxiety and acute/chronic pain    Pleural effusion, left  Assessment & Plan  Parapneumonic fluid from  abscesses  CT guided chest tube placed 4/23  TPA/dornase one dose on 4/24.  Hold d/t dropping Hgb  CT chest 4/25 with resolution of fluid  CT clamped 4/29-no significant accumulation of fluid so d/c 4/30  Monitor for now radiographically    Pneumonia  Assessment & Plan  MSSA septic emboli with empyema   BAL 4/20 klebsiella pneumonia  BAL 4/24 still heavy growth of klebsiella  4/28 OR pleural fluid specimen growing klebsiella.   Continue cefepime per ID    Tachycardia  Assessment & Plan  Persistent, sinus tach  Treat underlying causes    S/P  shunt- (present on admission)  Assessment & Plan  History of pseudotumor cerebri  History of  shunt by Dr. Oh  Could be seeded by MSSA, Dr. Oh consulted at [facility], recommended MRI but unable given her spinal stimulator    Shock (HCC)  Assessment & Plan  Undifferentiated acute am of 5/19   Fluid bolus, PRBC blood cultures, sputum, ID follow with broadening antibiotics  Lactate normal  Hg 6.5 will serial monitor hg, No coagulopathy  emperic rx with stress dose steroids and cortisol pending  Passive leg raise unremarkable  Worsening infiltrates and sputum on right lung place on ventilator    Debility  Assessment & Plan  PT/OT/SLP eval    Elevated alkaline phosphatase level- (present on admission)  Assessment & Plan  Unchanged --> recheck tomorrow  With dilated biliary ducts on imaging, stable  Unable to get MRCP due to nerve stimulator, ?  Eventual ERCP    Goals of care, counseling/discussion  Assessment & Plan  Palliative consulted  Full code    Spinal cord stimulator status  Assessment & Plan  Patient's stimulator is [employer]ve[employer]ra. It is FDA approved as MRI compatible, but the company has gone out of business, so there is no support team to assist with MRI.  Thus, if MRI were performed, our radiologists would need to protocol it correctly to manage the stimulator. The former rep from [employer] is Andre, [phone].  Information obtained from Dr. Mahoney's office,  [phone].    Per  (5/4/2021), it is not MRI compatible unfortunately.     Anasarca  Assessment & Plan  Secondary to chronic critical illness  Optimize nutrition and volume status    Anemia  Assessment & Plan  Daily CBC with conservative transfusion strategy  5/19 transfusion 1 prbc   Continue to monitor for bleeding in right lung CT's are minimal    Prolonged Q-T interval on ECG- (present on admission)  Assessment & Plan  Optimize electrolytes, continuous telemetry monitoring  Avoid QT prolonging medications as able    Chronic pain syndrome- (present on admission)  Assessment & Plan  Chronic back pain and intractable headaches  History of nerve stimulator   Dr Vargas consulted at admission, no plan to remove stimulator due to her clinical instability  Multimodal with scheduled oxycodone, gabapentin, methadone (increased dose 5/11)    Hypomagnesemia  Assessment & Plan  Magnesium 1.8  Replete to keep > 2.0    Hypokalemia  Assessment & Plan  Keep > 4.0    Anxiety- (present on admission)  Assessment & Plan  Improving  Continue Depakote, paxil, cymbalta, gabapentin  Limited as needed benzodiazepines    I have performed a physical exam and reviewed and updated ROS and Plan today (5/20/2021). In review of yesterday's note (5/19/2021), there are no changes except as documented above.     Discussed patient condition and risk of morbidity and/or mortality with RN, RT, Pharmacy, Charge nurse / hot rounds, Patient and general surgery      Patient remains in critical condition from needing ventilator support with active titration. Critical care time provided was 37 minutes. This excludes all separate billable procedures.

## 2021-05-20 NOTE — PROGRESS NOTES
Infectious Disease Daily Progress Note    Date of Service  5/20/2021    Chief Complaint    Meropenem 5/19-present    Previous ABX  Cefepime 4/23 5/18-  s/p 23 days  Cefazolin 5/18-5/19     Thoracoscopy & Decortication - 4/28  Decortication 5/17     PRIOR Rx:   Zosyn 4/22-4/23  Previous: Unasyn, Zosyn, Vanco, Daptomycin  Ceftaroline: start 4/13-4/15  Nafcillin 2g Q4 hrs 4/15-4/23 4/14: Unable to give name of previous NSG or neurologist. Seems confused, but able to say some sentences fluently.   4/15: Line cultures now growing MSSA. As well as from the blood. Repeat blood culture 4/13+ in both sets. Patient has worsening confusion. CSF fluid analyses suggest pleocytosis. Cultures are no growth from the CSF. Chest CT was ordered this morning indicating a loculated right pleural effusion as well as bilateral septic emboli. Dr. Mack spoke with Dr. Oh yesterday and no surgical intervention unless clinical deterioration.  4/16: Increased respiratory distress.  Tachypneic.  CT with loculated collection.  Dr Resendiz not available.  No thoracic surgeon available.  Plans to have pulmonary place CT.  Transferring to ICU.  4/17: Transferred to [facility] last night. Hgb dropped to 6.4 requiring PRBC transfusion. Requiring 2 pressors. Fio2 50%. Febrile 38.8. Pending OR decortication of empyema and hemothorax. Chest tube placed at [facility] shows 8,371 WBC, ,000, 74% N  4/18: Chest tube was upsized by surgery yesterday, no decortication. WBC 22k. Fio2 40%. Vasopressin. Levophed down to 2mcg. Afebrile 24 hrs. Last fever 38.6 on 4/16.   4/19: Still on vent. Levo 3 mcg, vasopressin. Afebrile 72 hours. WBC 21k. BC 4/17 NGTD x 48 hours. Unable to do MRI brain given neurostimulator per RN.   4/20: Remaining afebrile. Hb 6.2 and undergoing transfusion today.WBC 24,100, 5% bands.1700 ml CT output. Copious bloody ET secretions. No pressors. Receiving dornase and TPA per CT. Right pleural effusion not large per CXR today.   4/21:  WBC 31k. Bronchoscopy yesterday showing blood. Cultures sent. TPA on hold per RN due to arvind blood from chest tube requiring PRBC transfusion for hgb 6. Pending chest CT today. Per , spinal stimulator is non-MRI compatible. Levophed 10mcg. 30% Fio2. Afebrile.   4/22: Fever 101.3 this am. WBC 20,300 down from 32,200 yesterday. BAL growing Klebsiella pneumoniae but susceptibility panel not set up until today. CTA of brain shows no lesion. CT of chest shows enlarging cavitary lung lesions bilat and large loculated new left pleural effusion and large hydropneumothorax on right. TPA & dornase infusions of right chest ended 4/20 after considerable bleeding requiring transfusion. Hb now down again to 6.8.  4/23: WBC 23k. Fio2 40%. Tmax 38.1. US of stiumlator shows small fluid collection but no drainable abscess. Pending MICAH today. Pending L chest tube placement  4/24: Continue fever 100.8-101.8. WBC 16,600. MICAH shows large vegetations on both tricuspid valve and mitral valve with mild TR & MR.  No aortic root abscess. Left chest tube placed yesterday yielding 8 ml of old bloody fluid. Bronch today revealed copious thick maroon secretions in distal trachea and both mainstem bronchi. On the right these were obstructive. Remaining off pressors. Last positive blood cultures (MSSA) were 4/16, negative 4/17.  4/25: Fever 100.6 this AM. wBC 13,300. Hb 6.6. CT: right hydropneumothorax increasing, right bronchopleural fistula, mediastinal shift ot the left , multiple cavitary pulmonary nodules, 3.1 cm left basilar mass, splenomegaly. CT of head shows dural thickening over the clivus. Lac+ GNR >100,000 col/ml growing from BAL of 4/24, presumed Klebsiella. Left peural fluid culture negative from 4/23.  4/26: Fever 100.4 last night. WBC 13,500. Hb 7.4. Plts 502,000. ESR >120. UA fairly clear except 30 mg% protein, 2-5 wbc and rare rbc. CXR unchanged except more prominent pulmonary edema. GNR in BAL may be a different species  of Klebsiella, and resistant to ampicillin & tmp-sulfa; confirmation pending.  4/28: Fever 100.8 last night. WBC 27,700. Hb 6.6. Plts 482,000. Creat 0.19. Kleb pneum in BAL on 4/24 is resistant to ampicillin & tmp-sulfa but otherwise sensitive. O2 sat 96% on FIO2 30% now, PEEP 8. Has been re-evaluated by thoracic surgeon, Dr. Ganser, who believes she will not wean without decortication on the right. Surgery anticipated today.  4/29: S/P right thoracoscopy with decortication with cultures NG at 24 hrs.  4/30: Had a bronch due to hypoxia and hypotension. CXR with worsening right hemithorax pulmonary opacities. Bloody mucous plugs removed. Pressors started. Febrile this am. Tachy in 130s. Pressors just removed. Tolerating vent, but not a SBT candidate at the moment.  5/1: Small air leak CT-2 every ~ 2/3 breathes. The K. pneumoniae from her thoracoscopy is sensitive to her cefepime so no antibiotic change needed.   5/2: No air leak today she tolerated 2  hrs of spontaneous breathing with support today.      5/3: Second day with SBT and doing well now for 2 hrs. Slight bump in temperature and      WBC's but no obvious clinical infectious changed so watch closely.      5/4: Fever still from time to time. WBCs trending up. Chest tubes in place. Trach.       5/5: She clearly is better today she was sitting up in bed with open eyes and follows commands. She still has low grade fever but her WBC's are dropping. Cefepime dose increased yesterday for increased CNS coverage if necessary. She will probably need further thoracic surgery as mentioned by Dr. Ganser. The issue of what to do with her stimulator is still up in the air for now as it probably will need to be removed but she is nort a candidate for more major surgery at this time.       5/7: She continues to improve and under go longer SBT trials. She just had a new PICC placed in her right arm and plan is to D/C central line.        5/8: PICC placed. No fevers.  Comfortable. Cortrak placed. Failing SBTs.  5/9: No acute issues. No fevers. Comfortable. Family at bedside.   5/10: No acute issues, fevers or WBC bumps. No changes in condition. She is to get her CT scan today and be reviewed by surgery  5/11:  at bedside.  Has been referred to LANE.  Repeat CT scan completed.  Results noted.  ? If Dr Ganser has seen.  Still with an empyema:  ? If candidate for further surgery.  5/12: Pending next thoracic surgery. No fever. Anxious.  5/13: ? Surgery date.  No one seems to know.  Had speaking valve in and having swallow eval with speech therapy  5/14: No acute issues. No fevers. Surgery Monday.  5/15: One CT accidentally pulled out yesterday.  Has been on T piece and tolerating. Plans for surgery Monday afternoon.  5/16: On schedule for surgery 5/17: 4:30 pm.  Moved to room T614.  No new issues.  5/17: Was sitting up in the bedside chair since change of shift.  Now walking in hallways with RN and CNA.  5/18: Decortication yesterday with 2 chest tube placement. WBC improving 21->14k  5/19: Hypotension and tachycardia.  Decreased H/H.  Placed back on vent to rest.  Dr Lozano in process of placing new line.  Antibiotics were deesculated yesterday to Cefazolin.  No cx were taken at surgery.  5/20: Pt was febrile yest afternoon 38.2, but now afebrile overnight after meropenem. WBC improved 11k->8k. Fio2 30%. Phenylephrine now off this morning. CXR shows new R consolidations.     Review of Systems  Review of Systems   Unable to perform ROS: Acuity of condition        Physical Exam  Temp:  [36.3 °C (97.4 °F)-38.2 °C (100.8 °F)] 36.3 °C (97.4 °F)  Pulse:  [] 81  Resp:  [8-35] 15  BP: ()/(39-77) 96/50  SpO2:  [94 %-100 %] 96 %    Physical Exam  Constitutional:       Comments: Asleep sedated   HENT:      Head:      Comments: NG tube  Cardiovascular:      Rate and Rhythm: Normal rate and regular rhythm.      Heart sounds: No murmur heard.     Pulmonary:      Comments:  Diminished on R. 2 chest tubes with serosanguinous output.   Abdominal:      General: Abdomen is flat. Bowel sounds are normal. There is no distension.   Neurological:      Comments: sedated         Fluids    Intake/Output Summary (Last 24 hours) at 5/20/2021 1007  Last data filed at 5/20/2021 0800  Gross per 24 hour   Intake 4011.68 ml   Output 415 ml   Net 3596.68 ml       Laboratory  Recent Labs     05/19/21  1400 05/19/21  2312 05/20/21  0550   WBC 16.5* 11.8* 9.6   RBC 2.72* 2.34* 2.59*   HEMOGLOBIN 8.0* 6.8* 7.6*   HEMATOCRIT 25.0* 22.0* 24.2*   MCV 91.9 94.0 93.4   MCH 29.4 29.1 29.3   MCHC 32.0* 30.9* 31.4*   RDW 57.1* 59.0* 56.6*   PLATELETCT 349 291 262   MPV 9.2 9.6 9.5     Recent Labs     05/18/21  0910 05/19/21  0300 05/20/21  0550   SODIUM 139 138 143   POTASSIUM 4.2 3.1* 3.8   CHLORIDE 105 105 109   CO2 24 25 28   GLUCOSE 108* 124* 105*   BUN 21 25* 26*   CREATININE 0.33* 0.26* 0.18*   CALCIUM 9.3 8.8 8.4*                   Impression   -Severe sepsis  -Catheter related bloodstream infection due to infected port status post removal 4/12-staph aureus  -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       - Acute left empyema s/p chest tube placement 4/23, decortication 5/17 - Klebsiella  -Multiple acute septic pulmonary emboli bilaterally  -Acute bilateral pneumonia due to above     --Pulmonary edema  -Pseudotumor cerebri with underlying  shunt: possible arachnoiditis  -Chronic migraine headaches  -History of autoimmune vasculitis  -Elevated alkaline phosphatase & bilirubin  -Acute encephalopathy due to sepsis      - anemia due to above   - acute fever on 5/19 likely from secondary VAP   - Secondary VAP R sided pneumonia    - decubitus ulcer sacral.      Plan   Acute fever 38.2 But now resolved after switch to meropenem. No surgical cultures were taken on last decortication. Tracheal  aspirate recently 5/19 no growth, blood cultures 5/19 no growth so far. CXR shows worsening R sided infiltrates concerning for secondary infections after reviewing her CXR's between 5/18 and 5/19.  Continue to monitor fever curve    - cont meropenem for now and broadened coverage for secondary VAP.   - cont to follow sputum, blood cultures  - continue 4 weeks beyond last decortication  Patient was initially on cefepime for Klebsiella but more importanty for CNS penetration given concern for ventriculitis and need for CNS coverage from her staphylococcal infection  - after completion of IV abx  Will need suppression therapy afterward for her HW (spinal stimulator,  shunt  targeting MSSA  With keflex)  - according to RN, she does have some early decubitus shallow ulcer. Will monitor this. Please take photos.         Discussed with RN staff and Dr Lozano  > 45 min on floor in ICU with > 50% face to face view and 100% in direct patient care/counseling/ modifying antibiotics.

## 2021-05-20 NOTE — PROGRESS NOTES
"Progress Note:  5/20/2021, 12:09 PM    S: No acute events.  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]      Priority: High   • Acute respiratory failure with hypoxia (HCC) [J96.01]      Priority: High   • Acute bacterial endocarditis [I33.0]      Priority: High   • Empyema of right pleural space (HCC) [J86.9]      Priority: High   • Bacteremia due to methicillin susceptible Staphylococcus aureus (MSSA) [R78.81, B95.61]      Priority: High   • Sepsis (HCC) [A41.9]      Priority: High   • Acute encephalopathy [G93.40]      Priority: High   • Fever [R50.9]      Priority: Medium   • Pleural effusion, left [J90]      Priority: Medium   • Atelectasis [J98.11]      Priority: Medium   • CSF pleocytosis [D72.9]      Priority: Medium   • Pneumonia [J18.9]      Priority: Medium   • Tachycardia [R00.0]      Priority: Medium   • Pseudotumor cerebri [G93.2]      Priority: Medium     IMO load March 2020     • S/P  shunt [Z98.2]      Priority: Medium   • Prolonged Q-T interval on ECG [R94.31]      Priority: Low   • History of vasculitis [Z86.79]      Priority: Low   • History of complicated migraines [G43.109]      Priority: Low   • Hyponatremia [E87.1]      Priority: Low   • H/O: CVA (cerebrovascular accident) [Z86.73]      Priority: Low   • Chronic pain syndrome [G89.4]      Priority: Low   • Thrombocytopenia (HCC) [D69.6]      Priority: Low   • Hypokalemia [E87.6]      Priority: Low   • Hypomagnesemia [E83.42]      Priority: Low   • Anxiety [F41.9]      " Priority: Low   • Shock (HCC) [R57.9]    • Debility [R53.81]    • Elevated alkaline phosphatase level [R74.8]    • Spinal cord stimulator status [Z96.89]    • Goals of care, counseling/discussion [Z71.89]    • Pulmonary abscess (HCC) [J85.2]    • Anasarca [R60.1]    • Thrombocytosis (HCC) [D47.3]    • GERD (gastroesophageal reflux disease) [K21.9]    • Septic shock (HCC) [A41.9, R65.21]    • Anemia [D64.9]          P:   -Chest tubes to water seal (discussed with RN)  -Monitor R thoracotomy wound site.  Call with question    Drew Vasquez M.D.  [facility]  [phone]

## 2021-05-20 NOTE — PROGRESS NOTES
"Progress Note:  5/19/2021, 5:49 PM    S: No acute events. Back on vent.  Hemodynamics stable, but on low dose phenylephrine.  Transfused today    O:  /39   Pulse (!) 105   Temp (!) 38.2 °C (100.7 °F) (Axillary)   Resp 19   Ht 1.6 m (5' 2.99\")   Wt 62.7 kg (138 lb 3.7 oz)   SpO2 96%     Sleeping  R chest tubes to suction, no air leaks.  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]      Priority: High   • Acute respiratory failure with hypoxia (HCC) [J96.01]      Priority: High   • Acute bacterial endocarditis [I33.0]      Priority: High   • Empyema of right pleural space (HCC) [J86.9]      Priority: High   • Bacteremia due to methicillin susceptible Staphylococcus aureus (MSSA) [R78.81, B95.61]      Priority: High   • Sepsis (HCC) [A41.9]      Priority: High   • Acute encephalopathy [G93.40]      Priority: High   • Fever [R50.9]      Priority: Medium   • Pleural effusion, left [J90]      Priority: Medium   • Atelectasis [J98.11]      Priority: Medium   • CSF pleocytosis [D72.9]      Priority: Medium   • Pneumonia [J18.9]      Priority: Medium   • Tachycardia [R00.0]      Priority: Medium   • Pseudotumor cerebri [G93.2]      Priority: Medium     IMO load March 2020     • S/P  shunt [Z98.2]      Priority: Medium   • Prolonged Q-T interval on ECG [R94.31]      Priority: Low   • History of vasculitis [Z86.79]      Priority: Low   • History of complicated migraines [G43.109]      Priority: Low   • Hyponatremia [E87.1]      Priority: Low   • H/O: CVA (cerebrovascular accident) [Z86.73]      Priority: Low   • Chronic pain syndrome [G89.4]      Priority: Low   • Thrombocytopenia (HCC) [D69.6]      Priority: Low   • Hypokalemia [E87.6]      Priority: Low   • Hypomagnesemia [E83.42]      Priority: Low   • Anxiety [F41.9]      Priority: Low   • Shock (HCC) [R57.9]    • Debility [R53.81]    • Elevated " alkaline phosphatase level [R74.8]    • Spinal cord stimulator status [Z96.89]    • Goals of care, counseling/discussion [Z71.89]    • Pulmonary abscess (HCC) [J85.2]    • Anasarca [R60.1]    • Thrombocytosis (HCC) [D47.3]    • GERD (gastroesophageal reflux disease) [K21.9]    • Septic shock (HCC) [A41.9, R65.21]    • Anemia [D64.9]          P:   -Chest tubes to water seal  -Continue current management/supportive care    Drew Vasquez M.D.  [facility]  [phone]

## 2021-05-20 NOTE — CARE PLAN
The patient is Watcher - Medium risk of patient condition declining or worsening    Shift Goals  Clinical Goals: increase fluid volume status, improve hemodynamics  Patient Goals: pain control    Progress made toward(s) clinical / shift goals:  Patient received fluid bolus and one nit of red blood cells. Patient required increased pressor support at beginning of shift, but requirements are decreasing at end of shift.     Patient is not progressing towards the following goals:      Problem: Pain Management  Goal: Pain level will decrease to patient's comfort goal  Outcome: Progressing     Problem: Knowledge Deficit - Standard  Goal: Patient and family/care givers will demonstrate understanding of plan of care, disease process/condition, diagnostic tests and medications  Outcome: Progressing     Problem: Fall Risk  Goal: Patient will remain free from falls  Outcome: Progressing

## 2021-05-21 LAB
ABO GROUP BLD: ABNORMAL
ANION GAP SERPL CALC-SCNC: 5 MMOL/L (ref 7–16)
BACTERIA SPEC RESP CULT: ABNORMAL
BACTERIA SPEC RESP CULT: ABNORMAL
BARCODED ABORH UBTYP: 600
BARCODED PRD CODE UBPRD: NORMAL
BARCODED UNIT NUM UBUNT: NORMAL
BASE EXCESS BLDA CALC-SCNC: 3 MMOL/L (ref -4–3)
BASOPHILS # BLD AUTO: 0.3 % (ref 0–1.8)
BASOPHILS # BLD: 0.03 K/UL (ref 0–0.12)
BLD GP AB SCN SERPL QL: ABNORMAL
BODY TEMPERATURE: ABNORMAL DEGREES
BUN SERPL-MCNC: 27 MG/DL (ref 8–22)
CALCIUM SERPL-MCNC: 8.4 MG/DL (ref 8.5–10.5)
CHLORIDE SERPL-SCNC: 105 MMOL/L (ref 96–112)
CO2 BLDA-SCNC: 28 MMOL/L (ref 20–33)
CO2 SERPL-SCNC: 26 MMOL/L (ref 20–33)
COMPONENT R 8504R: NORMAL
CREAT SERPL-MCNC: 0.19 MG/DL (ref 0.5–1.4)
DELSYS IDSYS: ABNORMAL
END TIDAL CARBON DIOXIDE IECO2: 37 MMHG
EOSINOPHIL # BLD AUTO: 0.02 K/UL (ref 0–0.51)
EOSINOPHIL NFR BLD: 0.2 % (ref 0–6.9)
ERYTHROCYTE [DISTWIDTH] IN BLOOD BY AUTOMATED COUNT: 62.1 FL (ref 35.9–50)
GLUCOSE SERPL-MCNC: 105 MG/DL (ref 65–99)
GRAM STN SPEC: ABNORMAL
HCO3 BLDA-SCNC: 27.3 MMOL/L (ref 17–25)
HCT VFR BLD AUTO: 28.1 % (ref 37–47)
HGB BLD-MCNC: 8.9 G/DL (ref 12–16)
HOROWITZ INDEX BLDA+IHG-RTO: 270 MM[HG]
IMM GRANULOCYTES # BLD AUTO: 0.08 K/UL (ref 0–0.11)
IMM GRANULOCYTES NFR BLD AUTO: 0.8 % (ref 0–0.9)
LYMPHOCYTES # BLD AUTO: 1.46 K/UL (ref 1–4.8)
LYMPHOCYTES NFR BLD: 14.9 % (ref 22–41)
MAGNESIUM SERPL-MCNC: 1.6 MG/DL (ref 1.5–2.5)
MCH RBC QN AUTO: 28.4 PG (ref 27–33)
MCHC RBC AUTO-ENTMCNC: 31.7 G/DL (ref 33.6–35)
MCV RBC AUTO: 89.8 FL (ref 81.4–97.8)
MODE IMODE: ABNORMAL
MONOCYTES # BLD AUTO: 0.64 K/UL (ref 0–0.85)
MONOCYTES NFR BLD AUTO: 6.5 % (ref 0–13.4)
NEUTROPHILS # BLD AUTO: 7.55 K/UL (ref 2–7.15)
NEUTROPHILS NFR BLD: 77.3 % (ref 44–72)
NRBC # BLD AUTO: 0 K/UL
NRBC BLD-RTO: 0 /100 WBC
O2/TOTAL GAS SETTING VFR VENT: 30 %
PCO2 BLDA: 38.2 MMHG (ref 26–37)
PCO2 TEMP ADJ BLDA: 38.2 MMHG (ref 26–37)
PEEP END EXPIRATORY PRESSURE IPEEP: 8 CMH20
PERCENT MINUTE VOLUME IPMV: 120
PH BLDA: 7.46 [PH] (ref 7.4–7.5)
PH TEMP ADJ BLDA: 7.46 [PH] (ref 7.4–7.5)
PHOSPHATE SERPL-MCNC: 3 MG/DL (ref 2.5–4.5)
PLATELET # BLD AUTO: 326 K/UL (ref 164–446)
PMV BLD AUTO: 9.9 FL (ref 9–12.9)
PO2 BLDA: 81 MMHG (ref 64–87)
PO2 TEMP ADJ BLDA: 81 MMHG (ref 64–87)
POTASSIUM SERPL-SCNC: 3.9 MMOL/L (ref 3.6–5.5)
PRODUCT TYPE UPROD: NORMAL
RBC # BLD AUTO: 3.13 M/UL (ref 4.2–5.4)
RH BLD: ABNORMAL
SAO2 % BLDA: 97 % (ref 93–99)
SIGNIFICANT IND 70042: ABNORMAL
SITE SITE: ABNORMAL
SODIUM SERPL-SCNC: 136 MMOL/L (ref 135–145)
SOURCE SOURCE: ABNORMAL
SPECIMEN DRAWN FROM PATIENT: ABNORMAL
UNIT STATUS USTAT: NORMAL
WBC # BLD AUTO: 9.8 K/UL (ref 4.8–10.8)

## 2021-05-21 PROCEDURE — 700105 HCHG RX REV CODE 258: Performed by: PSYCHIATRY & NEUROLOGY

## 2021-05-21 PROCEDURE — 71045 X-RAY EXAM CHEST 1 VIEW: CPT

## 2021-05-21 PROCEDURE — 94003 VENT MGMT INPAT SUBQ DAY: CPT

## 2021-05-21 PROCEDURE — 700102 HCHG RX REV CODE 250 W/ 637 OVERRIDE(OP): Performed by: INTERNAL MEDICINE

## 2021-05-21 PROCEDURE — 86900 BLOOD TYPING SEROLOGIC ABO: CPT

## 2021-05-21 PROCEDURE — 94799 UNLISTED PULMONARY SVC/PX: CPT

## 2021-05-21 PROCEDURE — A9270 NON-COVERED ITEM OR SERVICE: HCPCS | Performed by: INTERNAL MEDICINE

## 2021-05-21 PROCEDURE — P9016 RBC LEUKOCYTES REDUCED: HCPCS

## 2021-05-21 PROCEDURE — 86923 COMPATIBILITY TEST ELECTRIC: CPT

## 2021-05-21 PROCEDURE — 770022 HCHG ROOM/CARE - ICU (200)

## 2021-05-21 PROCEDURE — 36430 TRANSFUSION BLD/BLD COMPNT: CPT

## 2021-05-21 PROCEDURE — 83735 ASSAY OF MAGNESIUM: CPT

## 2021-05-21 PROCEDURE — 97535 SELF CARE MNGMENT TRAINING: CPT

## 2021-05-21 PROCEDURE — 86850 RBC ANTIBODY SCREEN: CPT

## 2021-05-21 PROCEDURE — 82803 BLOOD GASES ANY COMBINATION: CPT

## 2021-05-21 PROCEDURE — 700111 HCHG RX REV CODE 636 W/ 250 OVERRIDE (IP): Performed by: INTERNAL MEDICINE

## 2021-05-21 PROCEDURE — 99291 CRITICAL CARE FIRST HOUR: CPT | Performed by: INTERNAL MEDICINE

## 2021-05-21 PROCEDURE — 86901 BLOOD TYPING SEROLOGIC RH(D): CPT

## 2021-05-21 PROCEDURE — 700101 HCHG RX REV CODE 250: Performed by: PSYCHIATRY & NEUROLOGY

## 2021-05-21 PROCEDURE — 80048 BASIC METABOLIC PNL TOTAL CA: CPT

## 2021-05-21 PROCEDURE — 85025 COMPLETE CBC W/AUTO DIFF WBC: CPT

## 2021-05-21 PROCEDURE — 700105 HCHG RX REV CODE 258: Performed by: INTERNAL MEDICINE

## 2021-05-21 PROCEDURE — 84100 ASSAY OF PHOSPHORUS: CPT

## 2021-05-21 RX ORDER — POTASSIUM CHLORIDE 20 MEQ/1
40 TABLET, EXTENDED RELEASE ORAL ONCE
Status: COMPLETED | OUTPATIENT
Start: 2021-05-21 | End: 2021-05-21

## 2021-05-21 RX ORDER — MIDODRINE HYDROCHLORIDE 5 MG/1
5 TABLET ORAL EVERY 8 HOURS
Status: DISCONTINUED | OUTPATIENT
Start: 2021-05-21 | End: 2021-05-27

## 2021-05-21 RX ORDER — MAGNESIUM SULFATE HEPTAHYDRATE 40 MG/ML
2 INJECTION, SOLUTION INTRAVENOUS ONCE
Status: COMPLETED | OUTPATIENT
Start: 2021-05-21 | End: 2021-05-21

## 2021-05-21 RX ADMIN — MEROPENEM 1 G: 1 INJECTION, POWDER, FOR SOLUTION INTRAVENOUS at 04:52

## 2021-05-21 RX ADMIN — ACETAMINOPHEN 500 MG: 325 TABLET, FILM COATED ORAL at 21:22

## 2021-05-21 RX ADMIN — HYDROMORPHONE HYDROCHLORIDE 2 MG: 2 INJECTION, SOLUTION INTRAMUSCULAR; INTRAVENOUS; SUBCUTANEOUS at 02:43

## 2021-05-21 RX ADMIN — HYDROMORPHONE HYDROCHLORIDE 2 MG: 2 INJECTION, SOLUTION INTRAMUSCULAR; INTRAVENOUS; SUBCUTANEOUS at 17:11

## 2021-05-21 RX ADMIN — LEVETIRACETAM 500 MG: 500 TABLET ORAL at 17:11

## 2021-05-21 RX ADMIN — MIDODRINE HYDROCHLORIDE 5 MG: 5 TABLET ORAL at 21:22

## 2021-05-21 RX ADMIN — RISPERIDONE 2 MG: 1 TABLET, FILM COATED ORAL at 04:53

## 2021-05-21 RX ADMIN — ACETAMINOPHEN 500 MG: 325 TABLET, FILM COATED ORAL at 14:11

## 2021-05-21 RX ADMIN — MAGNESIUM SULFATE 2 G: 2 INJECTION INTRAVENOUS at 08:17

## 2021-05-21 RX ADMIN — LEVETIRACETAM 500 MG: 500 TABLET ORAL at 04:54

## 2021-05-21 RX ADMIN — MEROPENEM 1 G: 1 INJECTION, POWDER, FOR SOLUTION INTRAVENOUS at 12:56

## 2021-05-21 RX ADMIN — GABAPENTIN 300 MG: 300 CAPSULE ORAL at 12:57

## 2021-05-21 RX ADMIN — METHADONE HYDROCHLORIDE 30 MG: 10 TABLET ORAL at 04:53

## 2021-05-21 RX ADMIN — DOCUSATE SODIUM 50 MG AND SENNOSIDES 8.6 MG 2 TABLET: 8.6; 5 TABLET, FILM COATED ORAL at 17:11

## 2021-05-21 RX ADMIN — GABAPENTIN 300 MG: 300 CAPSULE ORAL at 04:53

## 2021-05-21 RX ADMIN — DULOXETINE HYDROCHLORIDE 60 MG: 60 CAPSULE, DELAYED RELEASE ORAL at 17:11

## 2021-05-21 RX ADMIN — FAMOTIDINE 20 MG: 20 TABLET ORAL at 04:54

## 2021-05-21 RX ADMIN — HYDROMORPHONE HYDROCHLORIDE 2 MG: 2 INJECTION, SOLUTION INTRAMUSCULAR; INTRAVENOUS; SUBCUTANEOUS at 12:56

## 2021-05-21 RX ADMIN — RISPERIDONE 2 MG: 1 TABLET, FILM COATED ORAL at 17:11

## 2021-05-21 RX ADMIN — MEROPENEM 1 G: 1 INJECTION, POWDER, FOR SOLUTION INTRAVENOUS at 21:22

## 2021-05-21 RX ADMIN — DEXMEDETOMIDINE 0.4 MCG/KG/HR: 200 INJECTION, SOLUTION INTRAVENOUS at 16:21

## 2021-05-21 RX ADMIN — POTASSIUM CHLORIDE 40 MEQ: 1500 TABLET, EXTENDED RELEASE ORAL at 08:16

## 2021-05-21 RX ADMIN — DULOXETINE HYDROCHLORIDE 60 MG: 60 CAPSULE, DELAYED RELEASE ORAL at 04:54

## 2021-05-21 RX ADMIN — HYDROMORPHONE HYDROCHLORIDE 2 MG: 2 INJECTION, SOLUTION INTRAMUSCULAR; INTRAVENOUS; SUBCUTANEOUS at 08:17

## 2021-05-21 RX ADMIN — MIDODRINE HYDROCHLORIDE 5 MG: 5 TABLET ORAL at 14:11

## 2021-05-21 RX ADMIN — PAROXETINE HYDROCHLORIDE 10 MG: 20 TABLET, FILM COATED ORAL at 04:53

## 2021-05-21 RX ADMIN — DOCUSATE SODIUM 50 MG AND SENNOSIDES 8.6 MG 2 TABLET: 8.6; 5 TABLET, FILM COATED ORAL at 04:54

## 2021-05-21 RX ADMIN — GABAPENTIN 300 MG: 300 CAPSULE ORAL at 21:22

## 2021-05-21 RX ADMIN — HYDROMORPHONE HYDROCHLORIDE 2 MG: 2 INJECTION, SOLUTION INTRAMUSCULAR; INTRAVENOUS; SUBCUTANEOUS at 21:23

## 2021-05-21 RX ADMIN — FAMOTIDINE 20 MG: 20 TABLET ORAL at 17:11

## 2021-05-21 RX ADMIN — ACETAMINOPHEN 500 MG: 325 TABLET, FILM COATED ORAL at 08:17

## 2021-05-21 ASSESSMENT — PAIN DESCRIPTION - PAIN TYPE
TYPE: ACUTE PAIN;SURGICAL PAIN
TYPE: ACUTE PAIN
TYPE: ACUTE PAIN;SURGICAL PAIN
TYPE: CHRONIC PAIN

## 2021-05-21 ASSESSMENT — COGNITIVE AND FUNCTIONAL STATUS - GENERAL
DRESSING REGULAR UPPER BODY CLOTHING: A LOT
DAILY ACTIVITIY SCORE: 12
DRESSING REGULAR LOWER BODY CLOTHING: A LOT
PERSONAL GROOMING: A LITTLE
HELP NEEDED FOR BATHING: A LOT
TOILETING: A LOT
EATING MEALS: TOTAL
SUGGESTED CMS G CODE MODIFIER DAILY ACTIVITY: CL

## 2021-05-21 NOTE — DIETARY
"Nutrition Services: Weekly TF update  Day 35 of admit.  Enedelia Pang is a 50 y.o. female with admitting DX of Empyema, sepsis due to port line infection, endocarditis, Empyema lung (HCC)     Current TF regimen: Impact Peptide 1.5 @ goal rate 45 ml/hr, providing 1620 kcal, 102 grams protein, and 832 ml free water per day.     Assessment:  Height: 63\", Weight (last taken 5/19): 62.7 kg, BMI 24.49  Ideal Body Weight: 52.2 kg (115 lb)     Estimated needs:  REE per MSJ x1.2 = 1473 kcal/day  RMR per PSU (vent L/min 5.1, T max/24 hours 36.8) = 1269 kcal/day  9497-4205 kcal/day = 21-24 kcal/day  1.2-1.5+ grams protein/kg = 75-94+ grams protein/day      Evaluation:   1. Pt back on vent support via trach since 5/19.  2. TF @ goal.    3. Weight down 12.3 kg from initial 75 kg on 4/16; -16% in 5 weeks. Significant anasarca previously noted. Still +10.3 L fluid per I/O.   4. Labs reviewed. Pre-albumin and CRP trended favorably over 1 week.  5. Meds reviewed. Lasix stopped 5/19 (when pt was hypotensive, tachy, and went back on vent).  6. Last BM 5/20.  7. No wounds to follow.  8. ID following.  9. Estimated nutritional needs have decreased per predictive equations due to weight loss. However, do not want to promote further weight loss by providing fewer kcal and less protein. Same TF regimen will now provide 26 kcal/kg and 1.6 grams protein/kg, which is certainly not excessive. This TF formula remains appropriate for low-volume.      Malnutrition Risk: None specifically identified @ this time, as weight loss is also fluid-related.     Recommendations/Plan:  1. Continue current TF regimen.  2. Fluids per MD.     RD following.   "

## 2021-05-21 NOTE — CARE PLAN
Ventilator Daily Summary     Vent Day # 3 Trach day 21     Ventilator settings:  /8/30%     Respiratory Procedures: None     Plan: Continue current ventilator settings and wean mechanical ventilation as tolerated per physician orders.       Problem: Ventilation  Goal: Ability to achieve and maintain unassisted ventilation or tolerate decreased levels of ventilator support  Description: Document on Vent flowsheet    1.  Support and monitor invasive and noninvasive mechanical ventilation  2.  Monitor ventilator weaning response  3.  Perform ventilator associated pneumonia prevention interventions  4.  Manage ventilation therapy by monitoring diagnostic test results  Outcome: Met

## 2021-05-21 NOTE — CARE PLAN
Problem: Oxygenation:  Goal: Maintain adequate oxygenation dependent on patient condition  Outcome: Progressing                                       Ventilator Daily Summary     Vent Day #3 Trach day #21     Ventilator settings: /+8/40%     Weaning trials: SBT 5/+8/40% - on going      Respiratory Procedures: none     Plan: Continue current ventilator settings and wean mechanical ventilation as tolerated per physician orders

## 2021-05-21 NOTE — CARE PLAN
Problem: Pain Management  Goal: Pain level will decrease to patient's comfort goal  Outcome: Progressing  Assessed q2+ and medicated accordingly     Problem: Fall Risk  Goal: Patient will remain free from falls  Outcome: Progressing      Shift Goals  Clinical Goals: manage infection  Patient Goals: pain control    Progress made toward(s) clinical / shift goals: progressing    Patient is not progressing towards the following goals:

## 2021-05-21 NOTE — PROGRESS NOTES
"Progress Note:  5/21/2021, 1:42 PM    S: No acute events. Off pressors. Still requiring transfusion but minimal chest tube output.  CXR shows persistent R lung infiltrates    O:  /72   Pulse 83   Temp 36.8 °C (98.2 °F) (Temporal)   Resp 17   Ht 1.6 m (5' 3\")   Wt 62.7 kg (138 lb 3.7 oz)   SpO2 95%     Sleeping. 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]      Priority: High   • Acute respiratory failure with hypoxia (HCC) [J96.01]      Priority: High   • Acute bacterial endocarditis [I33.0]      Priority: High   • Empyema of right pleural space (HCC) [J86.9]      Priority: High   • Bacteremia due to methicillin susceptible Staphylococcus aureus (MSSA) [R78.81, B95.61]      Priority: High   • Sepsis (HCC) [A41.9]      Priority: High   • Acute encephalopathy [G93.40]      Priority: High   • Fever [R50.9]      Priority: Medium   • Pleural effusion, left [J90]      Priority: Medium   • Atelectasis [J98.11]      Priority: Medium   • CSF pleocytosis [D72.9]      Priority: Medium   • Pneumonia [J18.9]      Priority: Medium   • Tachycardia [R00.0]      Priority: Medium   • Pseudotumor cerebri [G93.2]      Priority: Medium     IMO load March 2020     • S/P  shunt [Z98.2]      Priority: Medium   • Prolonged Q-T interval on ECG [R94.31]      Priority: Low   • History of vasculitis [Z86.79]      Priority: Low   • History of complicated migraines [G43.109]      Priority: Low   • Hyponatremia [E87.1]      Priority: Low   • H/O: CVA (cerebrovascular accident) [Z86.73]      Priority: Low   • Chronic pain syndrome [G89.4]      Priority: Low   • Thrombocytopenia (HCC) [D69.6]      Priority: Low   • Hypokalemia [E87.6]      Priority: Low   • Hypomagnesemia [E83.42]      Priority: Low   • Anxiety [F41.9]      Priority: Low   • Shock (HCC) [R57.9]    • Debility " [R53.81]    • Elevated alkaline phosphatase level [R74.8]    • Spinal cord stimulator status [Z96.89]    • Goals of care, counseling/discussion [Z71.89]    • Pulmonary abscess (HCC) [J85.2]    • Anasarca [R60.1]    • Thrombocytosis (HCC) [D47.3]    • GERD (gastroesophageal reflux disease) [K21.9]    • Septic shock (HCC) [A41.9, R65.21]    • Anemia [D64.9]          P:   -Suspect some level of mucus plugging/intraparynchymal consolidation  -Continue aggressive pulm hygiene  -Chest tubes to water seal  -Continue Prevena until device runs out of battery, then remove.  Call surgery if concerns with thoracotomy incision and will recommend Wound Care consult with bedside VAC placement.  I will be away until Tuesday AM but call service with questions    Drew Vasquez M.D.  [facility]  [phone]

## 2021-05-21 NOTE — CARE PLAN
Problem: Pain Management  Goal: Pain level will decrease to patient's comfort goal  Outcome: Progressing     Problem: Fall Risk  Goal: Patient will remain free from falls  Outcome: Progressing       Shift Goals  Clinical Goals: maintain adequate pressures  Patient Goals: pain control    Progress made toward(s) clinical / shift goals:  pt participated in mobilization today and continued exercises prescribed by occupational therapy    Patient is not progressing towards the following goals:

## 2021-05-21 NOTE — PROGRESS NOTES
"Critical Care Progress Note    Date of admission  4/16/2021    Chief Complaint  \"50 y.o. female the past medical history of pseudotumor cerebri status post  shunt by Dr. Oh, chronic pain with history of placement of nerve stimulator, vasculitis, right anterior chest port for home IV meds with recurrent infection who presented 4/11/2021 with to [facility] with recurrent port infection. Found to have MSSA bacteremia, endocarditis, septic pulmonary emboli/empyema/pneumatocele, and CSF pleocytosis concerning for  shunt involvement.  Seen by Dr. Oh, NSGY, at [facility] who did not recommend  shunt removal.  Seen by Dr. Vargas here for concern of fluid collection around her spinal stimulator and he recommended against removal. Remains intubated, encephalopathic.\"  5/17:Critical care signing back on tonight after total right lung decortication for organized empyema  She was recovered in PACU, and is now on T-piece.  Follow-up ABG showed mild respiratory acidosis  She has 2 right chest tubes with marked improvement in aeration of right lung postoperatively, no pneumothorax on CXR  Minimize sedation, dexmedetomidine tonight if needed, pulmonary hygiene, follow-up arterial blood gas, positive airway pressure if needed     Hospital Course  4/16 admitted to ICU  4/17 VD 2, 2 FFP, pRBC overnight; new chest tube per gen surg  4/18 VD 3, TPa/Dornase with 1400 cc from R chest tube  4/26 R chest tube not functioning, d/c it.  broke his leg and going to OR today, so not available currently.  VD 11.  8/30%. RSBI immediately high on SBT attempt. Followed commands for RN  4/27 VD12. 8/30%. Not tolerating wean, high RSBI and tachycardic.   4/28 VD 13. VATS with Dr. Ganser.    4/29 VD 14. 8/30%. Still not tolerating wean d/t severe tachycardia just with SAT. Trialed precedex and she required fentanyl up to 600/hr to tolerate, so back on propofol. Chest tubes -40  4/30 VD 15. 8/30%. Try ketamine/versed as sedative. Severe " tachycardia and HTN with weaning down propofol. Plan for perc trach tomorrow. Chest tubes -40   perc trach. PICC placed. Chest tubes remain -40.  5 Able to spont well on sedation, but very tachy/HTN when sedation is turned down.  5/3 midazolam weaned down to 3mg/min   off versed and ketamine gtt   trials of SBT w/ trach   T piece trial during day, mobilize to chair x 2, rest vent  at night, started methadone, gabapentin    T piece during day with, rest PS  overnight, spacing out dilaudid PRNs   T-piece during day, still with anxiety weaning dex gtt  5/10 CT chest   will require thoracotomy and decortication   off ventilator x 24 hours   replete magnesium, remains on T-piece, ambulating, transfer to med/surg   -right thoracotomy with total lung decortication, remains on T-piece postoperatively did well   on T piece but with increase tachycardia 2l IV fluids, increase pain regime, lethargic just asking for dilaudid   am shock, bolused fluid and gave blood, place on vent and checked multiple labs, stress dose steroids, inc antibiotic coverage re-cultured, ill appearing, was on pressor and febrile   on vent to keep right lung open, 1 prbc, low dose pressor occaisional    Overnight events/roudin PRBC, on/off rina  Neuro: sedation vacation, dex 0.4, dilaudid prn, following OT/PT  HR: 50-70's  SBP: rina on and off  Tmax: afebrile  GI: TF at goal, BM smears   UOP: 400ml purwick  Lines: central line, artline, CTx2, wound vac  Resp: vent day 3 trach day 21, SBT fine   Vte: held  PPI/H2:pepcid  Antibx: meropenem klebsiella, Pleural klebsiella     CT 30ml CT 5ml  Daily CBC  Midodrine 5mg PO Q8  Spoke with ID continue meropenem  Remove leslie    Review of Systems  Review of Systems   Unable to perform ROS: Intubated        Vital Signs for last 24 hours   Temp:  [36.3 °C (97.3 °F)-36.8 °C (98.2 °F)] 36.8 °C (98.2 °F)  Pulse:  [65-97] 83  Resp:  [8-28] 17  BP:  ()/(39-80) 127/72  SpO2:  [93 %-99 %] 95 %    Hemodynamic parameters for last 24 hours       Respiratory Information for the last 24 hours  Vent Mode: Spont  PEEP/CPAP: 8  P Support: 5  MAP: 9.7  Length of Weaning Trial (Hours): 3 hours  Control VTE (exp VT): 522    Physical Exam   Physical Exam  Vitals and nursing note reviewed.   Constitutional:       General: She is not in acute distress.     Appearance: She is ill-appearing. She is not toxic-appearing or diaphoretic.      Comments: Laying in bed chronic ill appearing improved color   HENT:      Head: Normocephalic and atraumatic.      Comments: Purple hair     Nose: Nose normal.      Comments: cortrac     Mouth/Throat:      Mouth: Mucous membranes are moist.      Pharynx: Oropharynx is clear.   Eyes:      Pupils: Pupils are equal, round, and reactive to light.   Neck:      Comments: Tracheostomy in place on vent  Cardiovascular:      Rate and Rhythm: Regular rhythm. Tachycardia present.      Pulses: Normal pulses.      Heart sounds: Normal heart sounds.   Pulmonary:      Comments: Coarse mechanical breath bilateral R>L, CT x2 right chest with wound vac in place no redness around CT or vac, tolerating SBT  Abdominal:      General: There is no distension.      Palpations: Abdomen is soft. There is no mass.      Tenderness: There is no abdominal tenderness. There is no guarding or rebound.      Hernia: No hernia is present.   Musculoskeletal:         General: No swelling or tenderness. Normal range of motion.      Cervical back: Normal range of motion and neck supple.   Skin:     General: Skin is warm and dry.      Capillary Refill: Capillary refill takes less than 2 seconds.      Coloration: Skin is pale. Skin is not jaundiced.      Findings: No bruising or erythema.   Neurological:      General: No focal deficit present.      Comments: Still lethargic but improved, follows commands, moves all extremities, mouths words         Medications  Current  Facility-Administered Medications   Medication Dose Route Frequency Provider Last Rate Last Admin   • magnesium sulfate IVPB premix 2 g  2 g Intravenous Once Juventino Lozano M.D.       • potassium chloride SA (Kdur) tablet 40 mEq  40 mEq Enteral Tube Once Juventino Lozano M.D.       • dexmedetomidine (PRECEDEX) 400 mcg/100mL NS premix infusion  0.1-1.5 mcg/kg/hr Intravenous Continuous Sergio Mireles M.D. 6.3 mL/hr at 05/20/21 2048 0.4 mcg/kg/hr at 05/20/21 2048   • phenylephrine (VEDA-SYNEPHRINE) 40 mg in  mL Infusion  0-300 mcg/min Intravenous Continuous Juventino Lozano M.D.   Stopped at 05/21/21 0500   • HYDROmorphone (DILAUDID) injection 2 mg  2 mg Intravenous Q4HRS PRN Juventino Lozano M.D.   2 mg at 05/21/21 0243   • meropenem (MERREM) 1 g in  mL IVPB  1,000 mg Intravenous Q8HRS Dixie Albarran M.D.   Stopped at 05/21/21 0522   • Respiratory Therapy Consult   Nebulization Continuous RT Juventino Lozano M.D.       • famotidine (PEPCID) tablet 20 mg  20 mg Enteral Tube Q12HRS Juventino Lozano M.D.   20 mg at 05/21/21 0454    Or   • famotidine (PEPCID) injection 20 mg  20 mg Intravenous Q12HRS Juventino Lozano M.D.       • MD Alert...ICU Electrolyte Replacement per Pharmacy   Other PHARMACY TO DOSE Juventino Lozano M.D.       • lidocaine (XYLOCAINE) 1 % injection 1-2 mL  1-2 mL Tracheal Tube Q30 MIN PRN Juventino Lozano M.D.       • levETIRAcetam (KEPPRA) tablet 500 mg  500 mg Enteral Tube BID Juventino Lozano M.D.   500 mg at 05/21/21 0454   • gabapentin (NEURONTIN) capsule 300 mg  300 mg Enteral Tube Q8HRS Juventino Lozano M.D.   300 mg at 05/21/21 0453   • methadone (DOLOPHINE) tablet 30 mg  30 mg Enteral Tube DAILY Juventino Lozano M.D.   30 mg at 05/21/21 0453   • diazePAM (VALIUM) tablet 5 mg  5 mg Enteral Tube QHS PRN Raphael Armstrong M.D.   5 mg at 05/19/21 2136   • ondansetron (ZOFRAN) syringe/vial injection 4 mg  4 mg Intravenous Q4HRS PRN Juventino Lozano M.D.   4 mg at  05/13/21 0431   • acetaminophen (TYLENOL) tablet 500 mg  500 mg Enteral Tube TID Juventino Lozano M.D.   500 mg at 05/20/21 2200   • oxyCODONE immediate-release (ROXICODONE) tablet 5 mg  5 mg Enteral Tube Q4HRS PRN Juventino Lozano M.D.   5 mg at 05/19/21 2032   • labetalol (NORMODYNE/TRANDATE) injection 10 mg  10 mg Intravenous Q4HRS PRN Juventino Lozano M.D.   10 mg at 05/04/21 0226   • Pharmacy Consult: Enteral tube insertion - review meds/change route/product selection  1 Each Other PHARMACY TO DOSE Juventino Lozano M.D.       • acetaminophen (Tylenol) tablet 650 mg  650 mg Enteral Tube Q4HRS PRN Juventino Lozano M.D.   650 mg at 05/12/21 1233   • magnesium hydroxide (MILK OF MAGNESIA) suspension 30 mL  30 mL Enteral Tube QDAY PRN Juventino Lozano M.D.        And   • senna-docusate (PERICOLACE or SENOKOT S) 8.6-50 MG per tablet 2 tablet  2 tablet Enteral Tube BID Juventino Lozano M.D.   2 tablet at 05/21/21 0454    And   • polyethylene glycol/lytes (MIRALAX) PACKET 1 Packet  1 Packet Enteral Tube QDAY PRN Juventino Lozano M.D.   1 Packet at 05/20/21 0505    And   • bisacodyl (DULCOLAX) suppository 10 mg  10 mg Rectal QDAY PRN Juventino Lozano M.D.       • DULoxetine (CYMBALTA) capsule 60 mg  60 mg Enteral Tube BID Elliott Salvador M.D.   60 mg at 05/21/21 0454   • PARoxetine (PAXIL) tablet 10 mg  10 mg Enteral Tube QAM Elliott Salvador M.D.   10 mg at 05/21/21 0453   • risperiDONE (RISPERDAL) tablet 2 mg  2 mg Enteral Tube BID Elliott Salvador M.D.   2 mg at 05/21/21 0453   • ipratropium-albuterol (DUONEB) nebulizer solution  3 mL Nebulization Q2HRS PRN (RT) Juventino Lozano M.D.           Fluids    Intake/Output Summary (Last 24 hours) at 5/21/2021 0802  Last data filed at 5/21/2021 0600  Gross per 24 hour   Intake 2466.46 ml   Output 168 ml   Net 2298.46 ml       Laboratory  Recent Labs     05/19/21  1043 05/20/21  0702 05/21/21  0327   ISTATAPH 7.494 7.458 7.461   ISTATAPCO2 37.7* 39.0* 38.2*    ISTATAPO2 74 74 81   ISTATATCO2 30 29 28   AGPWHNF3DVB 96 95 97   ISTATARTHCO3 29.0* 27.6* 27.3*   ISTATARTBE 5* 3 3   ISTATTEMP 98.3 F 98.0 F 37.0 C   ISTATFIO2 30 30 30   ISTATSPEC Arterial Arterial Arterial   ISTATAPHTC 7.497 7.463 7.461   [id number] 73 73 81         Recent Labs     05/18/21  0910 05/18/21  0910 05/19/21  0300 05/20/21  0550 05/21/21  0520   SODIUM 139   < > 138 143 136   POTASSIUM 4.2   < > 3.1* 3.8 3.9   CHLORIDE 105   < > 105 109 105   CO2 24   < > 25 28 26   BUN 21   < > 25* 26* 27*   CREATININE 0.33*   < > 0.26* 0.18* 0.19*   MAGNESIUM 1.6  --   --  1.7 1.6   PHOSPHORUS 4.4  --   --  2.2* 3.0   CALCIUM 9.3   < > 8.8 8.4* 8.4*    < > = values in this interval not displayed.     Recent Labs     05/19/21 0300 05/20/21  0550 05/21/21  0520   GLUCOSE 124* 105* 105*     Recent Labs     05/19/21 0300 05/19/21 0910 05/20/21  0550 05/20/21 1827 05/21/21  0520   WBC 12.4*   < > 9.6 6.9 9.8   NEUTSPOLYS 78.10*  --  72.70*  --  77.30*   LYMPHOCYTES 8.60*  --  15.60*  --  14.90*   MONOCYTES 12.70  --  10.90  --  6.50   EOSINOPHILS 0.00  --  0.10  --  0.20   BASOPHILS 0.20  --  0.10  --  0.30    < > = values in this interval not displayed.     Recent Labs     05/20/21 0550 05/20/21 1827 05/21/21  0520   RBC 2.59* 2.31* 3.13*   HEMOGLOBIN 7.6* 6.9* 8.9*   HEMATOCRIT 24.2* 21.7* 28.1*   PLATELETCT 262 246 326       Imaging  X-Ray:  I have personally reviewed the images and compared with prior images.    Assessment/Plan  * Empyema of right pleural space (HCC)- (present on admission)  Assessment & Plan  R empyema due to septic pulmonary emboli  4/16 Chest tube placement at [facility]  4/18 Upsized by Dr Benedict  4/18-4/20 Received TPA/dornase.  Stopped b/c Hgb dropped requiring transfusion  4/26 was discontinued due to stopped functioning   4/28 s/p right VATS and decortication. Cx grew of Klebsiella pneumonia  5/17 total right lung decortication for organized empyema, right chest tubes x2    Bacteremia due  to methicillin susceptible Staphylococcus aureus (MSSA)- (present on admission)  Assessment & Plan  Source presumed right anterior chest line/port with endocarditis  Multiple sources for MSSA including MV/TV vegetation, port, spinal stimulator/ shunt  Status post port removal at [facility]  Last positive blood cultures were 4/17  Status post treatment with nafcillin from 4/16-4/23  ID following, continue cefepime    Acute bacterial endocarditis- (present on admission)  Assessment & Plan  MSSA bacteremia at OSH. First negative blood cultures were on 4/17  Infected port removed at [facility]   shunt and spinal stimulator could be seeded, NSGY has consulted on both and don't recommend removal at admission due to HD instability.   MV and TV vegetations with septic pulmonary emboli  Nataly ID following, antibiotics escalated to cefepime for VAP and klebsiella on prior pleural fluid  Follow-up cultures from decortication 5/17    Acute respiratory failure with hypoxia (HCC)- (present on admission)  Assessment & Plan  4/15 Intubated for acute hypoxic respiratory failure at [facility] due to hydro/pneumo, trapped lung, lung abscesses. 5/1 s/p percutaneous tracheostomy  Place back on ventilator for peep and recruitment and rest with her acute shock 5/19 gas exchange good  Monitor timing of attempt t-piece trials      Sepsis (HCC)  Assessment & Plan        Acute encephalopathy- (present on admission)  Assessment & Plan  Reorient and maintain sleep/wake cycle, mobilize  Limit sedative when able  Difficult to treat anxiety and acute/chronic pain    Pleural effusion, left  Assessment & Plan  Parapneumonic fluid from abscesses  CT guided chest tube placed 4/23  TPA/dornase one dose on 4/24.  Hold d/t dropping Hgb  CT chest 4/25 with resolution of fluid  CT clamped 4/29-no significant accumulation of fluid so d/c 4/30  Monitor for now radiographically    Pneumonia  Assessment & Plan  MSSA septic emboli with empyema   BAL 4/20 klebsiella  pneumonia  BAL 4/24 still heavy growth of klebsiella  4/28 OR pleural fluid specimen growing klebsiella.   Continue cefepime per ID    Tachycardia  Assessment & Plan  Persistent, sinus tach  Treat underlying causes    S/P  shunt- (present on admission)  Assessment & Plan  History of pseudotumor cerebri  History of  shunt by Dr. Oh  Could be seeded by MSSA, Dr. Oh consulted at [facility], recommended MRI but unable given her spinal stimulator    Shock (HCC)  Assessment & Plan  Undifferentiated acute am of 5/19   Fluid bolus, PRBC blood cultures, sputum, ID follow with broadening antibiotics  Lactate normal  Hg 6.5 will serial monitor hg, No coagulopathy  emperic rx with stress dose steroids and cortisol pending  Passive leg raise unremarkable  Worsening infiltrates and sputum on right lung place on ventilator    Debility  Assessment & Plan  PT/OT/SLP eval    Elevated alkaline phosphatase level- (present on admission)  Assessment & Plan  Unchanged --> recheck tomorrow  With dilated biliary ducts on imaging, stable  Unable to get MRCP due to nerve stimulator, ?  Eventual ERCP    Goals of care, counseling/discussion  Assessment & Plan  Palliative consulted  Full code    Spinal cord stimulator status  Assessment & Plan  Patient's stimulator is [employer]. It is FDA approved as MRI compatible, but the company has gone out of business, so there is no support team to assist with MRI.  Thus, if MRI were performed, our radiologists would need to protocol it correctly to manage the stimulator. The former rep from [employer] is Andre, [phone].  Information obtained from Dr. Mahoney's office, [phone].    Per  (5/4/2021), it is not MRI compatible unfortunately.     Anasarca  Assessment & Plan  Secondary to chronic critical illness  Optimize nutrition and volume status    Anemia  Assessment & Plan  Daily CBC with conservative transfusion strategy  5/19 transfusion 1 prbc   Continue to monitor for bleeding in right  lung CT's are minimal    Prolonged Q-T interval on ECG- (present on admission)  Assessment & Plan  Optimize electrolytes, continuous telemetry monitoring  Avoid QT prolonging medications as able    Chronic pain syndrome- (present on admission)  Assessment & Plan  Chronic back pain and intractable headaches  History of nerve stimulator   Dr Vargas consulted at admission, no plan to remove stimulator due to her clinical instability  Multimodal with scheduled oxycodone, gabapentin, methadone (increased dose 5/11)    Hypomagnesemia  Assessment & Plan  Magnesium 1.8  Replete to keep > 2.0    Hypokalemia  Assessment & Plan  Keep > 4.0    Anxiety- (present on admission)  Assessment & Plan  Improving  Continue Depakote, paxil, cymbalta, gabapentin  Limited as needed benzodiazepines    I have performed a physical exam and reviewed and updated ROS and Plan today (5/21/2021). In review of yesterday's note (5/20/2021), there are no changes except as documented above.     Discussed patient condition and risk of morbidity and/or mortality with RN, RT, Pharmacy, Charge nurse / hot rounds, Patient and general surgery      Patient remains in critical condition from needing ventilator support with active titration and on low dose pressor with active titration. Critical care time provided was 41 minutes. This excludes all separate billable procedures.

## 2021-05-21 NOTE — CARE PLAN
Problem: Pain Management  Goal: Pain level will decrease to patient's comfort goal  Outcome: Not Met  Note: Pt c/o back pain throughout the shift. Repositioned pt several times and gave PRN medication.

## 2021-05-21 NOTE — PROGRESS NOTES
Infectious Disease Daily Progress Note    Date of Service  5/21/2021    Chief Complaint    Meropenem 5/19-present    Previous ABX  Cefepime 4/23 5/18-  s/p 23 days  Cefazolin 5/18-5/19     Thoracoscopy & Decortication - 4/28  Decortication 5/17     PRIOR Rx:   Zosyn 4/22-4/23  Previous: Unasyn, Zosyn, Vanco, Daptomycin  Ceftaroline: start 4/13-4/15  Nafcillin 2g Q4 hrs 4/15-4/23 4/14: Unable to give name of previous NSG or neurologist. Seems confused, but able to say some sentences fluently.   4/15: Line cultures now growing MSSA. As well as from the blood. Repeat blood culture 4/13+ in both sets. Patient has worsening confusion. CSF fluid analyses suggest pleocytosis. Cultures are no growth from the CSF. Chest CT was ordered this morning indicating a loculated right pleural effusion as well as bilateral septic emboli. Dr. Mack spoke with Dr. Oh yesterday and no surgical intervention unless clinical deterioration.  4/16: Increased respiratory distress.  Tachypneic.  CT with loculated collection.  Dr Resendiz not available.  No thoracic surgeon available.  Plans to have pulmonary place CT.  Transferring to ICU.  4/17: Transferred to [facility] last night. Hgb dropped to 6.4 requiring PRBC transfusion. Requiring 2 pressors. Fio2 50%. Febrile 38.8. Pending OR decortication of empyema and hemothorax. Chest tube placed at [facility] shows 8,371 WBC, ,000, 74% N  4/18: Chest tube was upsized by surgery yesterday, no decortication. WBC 22k. Fio2 40%. Vasopressin. Levophed down to 2mcg. Afebrile 24 hrs. Last fever 38.6 on 4/16.   4/19: Still on vent. Levo 3 mcg, vasopressin. Afebrile 72 hours. WBC 21k. BC 4/17 NGTD x 48 hours. Unable to do MRI brain given neurostimulator per RN.   4/20: Remaining afebrile. Hb 6.2 and undergoing transfusion today.WBC 24,100, 5% bands.1700 ml CT output. Copious bloody ET secretions. No pressors. Receiving dornase and TPA per CT. Right pleural effusion not large per CXR today.   4/21:  WBC 31k. Bronchoscopy yesterday showing blood. Cultures sent. TPA on hold per RN due to arvind blood from chest tube requiring PRBC transfusion for hgb 6. Pending chest CT today. Per , spinal stimulator is non-MRI compatible. Levophed 10mcg. 30% Fio2. Afebrile.   4/22: Fever 101.3 this am. WBC 20,300 down from 32,200 yesterday. BAL growing Klebsiella pneumoniae but susceptibility panel not set up until today. CTA of brain shows no lesion. CT of chest shows enlarging cavitary lung lesions bilat and large loculated new left pleural effusion and large hydropneumothorax on right. TPA & dornase infusions of right chest ended 4/20 after considerable bleeding requiring transfusion. Hb now down again to 6.8.  4/23: WBC 23k. Fio2 40%. Tmax 38.1. US of stiumlator shows small fluid collection but no drainable abscess. Pending MICAH today. Pending L chest tube placement  4/24: Continue fever 100.8-101.8. WBC 16,600. MICAH shows large vegetations on both tricuspid valve and mitral valve with mild TR & MR.  No aortic root abscess. Left chest tube placed yesterday yielding 8 ml of old bloody fluid. Bronch today revealed copious thick maroon secretions in distal trachea and both mainstem bronchi. On the right these were obstructive. Remaining off pressors. Last positive blood cultures (MSSA) were 4/16, negative 4/17.  4/25: Fever 100.6 this AM. wBC 13,300. Hb 6.6. CT: right hydropneumothorax increasing, right bronchopleural fistula, mediastinal shift ot the left , multiple cavitary pulmonary nodules, 3.1 cm left basilar mass, splenomegaly. CT of head shows dural thickening over the clivus. Lac+ GNR >100,000 col/ml growing from BAL of 4/24, presumed Klebsiella. Left peural fluid culture negative from 4/23.  4/26: Fever 100.4 last night. WBC 13,500. Hb 7.4. Plts 502,000. ESR >120. UA fairly clear except 30 mg% protein, 2-5 wbc and rare rbc. CXR unchanged except more prominent pulmonary edema. GNR in BAL may be a different species  of Klebsiella, and resistant to ampicillin & tmp-sulfa; confirmation pending.  4/28: Fever 100.8 last night. WBC 27,700. Hb 6.6. Plts 482,000. Creat 0.19. Kleb pneum in BAL on 4/24 is resistant to ampicillin & tmp-sulfa but otherwise sensitive. O2 sat 96% on FIO2 30% now, PEEP 8. Has been re-evaluated by thoracic surgeon, Dr. Ganser, who believes she will not wean without decortication on the right. Surgery anticipated today.  4/29: S/P right thoracoscopy with decortication with cultures NG at 24 hrs.  4/30: Had a bronch due to hypoxia and hypotension. CXR with worsening right hemithorax pulmonary opacities. Bloody mucous plugs removed. Pressors started. Febrile this am. Tachy in 130s. Pressors just removed. Tolerating vent, but not a SBT candidate at the moment.  5/1: Small air leak CT-2 every ~ 2/3 breathes. The K. pneumoniae from her thoracoscopy is sensitive to her cefepime so no antibiotic change needed.   5/2: No air leak today she tolerated 2  hrs of spontaneous breathing with support today.      5/3: Second day with SBT and doing well now for 2 hrs. Slight bump in temperature and      WBC's but no obvious clinical infectious changed so watch closely.      5/4: Fever still from time to time. WBCs trending up. Chest tubes in place. Trach.       5/5: She clearly is better today she was sitting up in bed with open eyes and follows commands. She still has low grade fever but her WBC's are dropping. Cefepime dose increased yesterday for increased CNS coverage if necessary. She will probably need further thoracic surgery as mentioned by Dr. Ganser. The issue of what to do with her stimulator is still up in the air for now as it probably will need to be removed but she is nort a candidate for more major surgery at this time.       5/7: She continues to improve and under go longer SBT trials. She just had a new PICC placed in her right arm and plan is to D/C central line.        5/8: PICC placed. No fevers.  Comfortable. Cortrak placed. Failing SBTs.  5/9: No acute issues. No fevers. Comfortable. Family at bedside.   5/10: No acute issues, fevers or WBC bumps. No changes in condition. She is to get her CT scan today and be reviewed by surgery  5/11:  at bedside.  Has been referred to LANE.  Repeat CT scan completed.  Results noted.  ? If Dr Ganser has seen.  Still with an empyema:  ? If candidate for further surgery.  5/12: Pending next thoracic surgery. No fever. Anxious.  5/13: ? Surgery date.  No one seems to know.  Had speaking valve in and having swallow eval with speech therapy  5/14: No acute issues. No fevers. Surgery Monday.  5/15: One CT accidentally pulled out yesterday.  Has been on T piece and tolerating. Plans for surgery Monday afternoon.  5/16: On schedule for surgery 5/17: 4:30 pm.  Moved to room T614.  No new issues.  5/17: Was sitting up in the bedside chair since change of shift.  Now walking in hallways with RN and CNA.  5/18: Decortication yesterday with 2 chest tube placement. WBC improving 21->14k  5/19: Hypotension and tachycardia.  Decreased H/H.  Placed back on vent to rest.  Dr Lozano in process of placing new line.  Antibiotics were deesculated yesterday to Cefazolin.  No cx were taken at surgery.  5/20: Pt was febrile yest afternoon 38.2, but now afebrile overnight after meropenem. WBC improved 11k->8k. Fio2 30%. Phenylephrine now off this morning. CXR shows new R consolidations.   5/21: Off pressors.  On vent: FiO2 30%, PEEP 8, PS 5 Received pRBCs yesterday.    Review of Systems  Review of Systems   Unable to perform ROS: Acuity of condition        Physical Exam  Temp:  [36.3 °C (97.3 °F)-36.8 °C (98.2 °F)] 36.8 °C (98.2 °F)  Pulse:  [65-97] 83  Resp:  [10-28] 17  BP: ()/(39-80) 127/72  SpO2:  [93 %-99 %] 95 %    Physical Exam  Vitals and nursing note reviewed.   Constitutional:       Comments: Lethargic, but opens eyes to name.   HENT:      Head:      Comments: NG  tube  Cardiovascular:      Rate and Rhythm: Normal rate and regular rhythm.      Heart sounds: No murmur heard.     Pulmonary:      Comments: On vent: FiO2 30%, PEEP , PS 5.  Diminished on R. 2 chest tubes with serosanguinous output.   Abdominal:      General: Abdomen is flat. Bowel sounds are normal. There is no distension.   Skin:     Comments: Thoracotomy incision not examined today.   Neurological:      Comments: sedated         Fluids    Intake/Output Summary (Last 24 hours) at 5/21/2021 1108  Last data filed at 5/21/2021 0600  Gross per 24 hour   Intake 2340.94 ml   Output 138 ml   Net 2202.94 ml       Laboratory  Recent Labs     05/20/21  0550 05/20/21  1827 05/21/21  0520   WBC 9.6 6.9 9.8   RBC 2.59* 2.31* 3.13*   HEMOGLOBIN 7.6* 6.9* 8.9*   HEMATOCRIT 24.2* 21.7* 28.1*   MCV 93.4 93.9 89.8   MCH 29.3 29.9 28.4   MCHC 31.4* 31.8* 31.7*   RDW 56.6* 56.6* 62.1*   PLATELETCT 262 246 326   MPV 9.5 9.9 9.9     Recent Labs     05/19/21  0300 05/20/21  0550 05/21/21  0520   SODIUM 138 143 136   POTASSIUM 3.1* 3.8 3.9   CHLORIDE 105 109 105   CO2 25 28 26   GLUCOSE 124* 105* 105*   BUN 25* 26* 27*   CREATININE 0.26* 0.18* 0.19*   CALCIUM 8.8 8.4* 8.4*                   Impression   -Severe sepsis  -Catheter related bloodstream infection due to infected port status post removal 4/12-staph aureus  -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       - Acute left empyema s/p chest tube placement 4/23, decortication 5/17 - Klebsiella  -Multiple acute septic pulmonary emboli bilaterally  -Acute bilateral pneumonia due to above     --Pulmonary edema  -Pseudotumor cerebri with underlying  shunt: possible arachnoiditis  -Chronic migraine headaches  -History of autoimmune vasculitis  -Elevated alkaline phosphatase & bilirubin  -Acute encephalopathy due to sepsis      - anemia due to above   - acute fever on  5/19 likely from secondary VAP   - Secondary VAP R sided pneumonia    - decubitus ulcer sacral.      Plan   No surgical cultures were taken on last decortication. Tracheal aspirate recently 5/19 no growth, blood cultures 5/19 no growth so far. CXR shows worsening R sided infiltrates concerning for secondary infections after reviewing her CXR's between 5/18 and 5/19.    Continue to monitorfever curve    - cont meropenem for now and broadened coverage for secondary VAP.   - Sputum showing normal sari and the Klebsiella.  MICs show only resistance to TM-SX.  - continue 4 weeks beyond last decortication  Patient was initially on cefepime for Klebsiella but more importanty for CNS penetration given concern for ventriculitis and need for CNS coverage from her staphylococcal infection  - after completion of IV abx  Will need suppression therapy afterward for her HW (spinal stimulator,  shunt  targeting MSSA  With keflex).  Will continue Meropenem for now.  Make certain she continues to improve before considering attempt to deesculate again.  - Will need to watch wounds.  Need photodocumentation.    Disussed with RN and Dr Lozano  35 min spent in critical care management without overlap coordinating care/counseling.          Discussed with RN staff and Dr Lozano  > 45 min on floor in ICU with > 50% face to face view and 100% in direct patient care/counseling/ modifying antibiotics.

## 2021-05-21 NOTE — THERAPY
Occupational Therapy  Daily Treatment     Patient Name: Enedelia Pang  Age:  50 y.o., Sex:  female  Medical Record #: [id number]  Today's Date: 5/21/2021     Precautions  Precautions: Fall Risk, Nasogastric Tube, Tracheostomy , Swallow Precautions ( See Comments), Chest Tube  Comments: Chest tubes x2 to suction    Assessment    Pt seen for OT session. Pt req to be back on vent after and now s/p decortication 5/17 and then found to be in shock req blood transfusion. More lethargic this session; fatigues quickly at EOB. Req min A for seated g/h and assist for RUE; declined STS d/t fatigue. Continues to be limited by decreased functional mobility, activity tolerance, cognition, strength, AROM, coordination, balance, and pain which are currently affecting pt's ability to complete ADLs/IADLs at baseline. Will continue to follow.     Plan    Treatment plan modified to 3 times per week until therapy goals are met for the following treatments:  Adaptive Equipment, Neuro Re-Education / Balance, Self Care/Activities of Daily Living, Therapeutic Activities and Therapeutic Exercises.    DC Equipment Recommendations: Unable to determine at this time  Discharge Recommendations: Recommend post-acute placement for additional occupational therapy services prior to discharge home    Objective       05/21/21 1200   Precautions   Precautions Fall Risk;Nasogastric Tube;Tracheostomy ;Swallow Precautions ( See Comments);Chest Tube   Comments Chest tubes x2 to suction   Vitals   O2 Delivery Device Ventilator   Pain 0 - 10 Group   Therapist Pain Assessment Post Activity Pain Same as Prior to Activity;During Activity;Nurse Notified  (no c/o pain)   Cognition    Cognition / Consciousness WDL   Speech/ Communication Nods Appropriately;Intubated / Trached;Writes Comprehensible Notes  (inconsistent nodding)   Level of Consciousness Alert   Safety Awareness Impaired   Attention Impaired   Comments more lethargic than previous session. Minimal  head nodding and mouthing words. req redirection for attention   Passive ROM Upper Body   Passive ROM Upper Body WDL   Active ROM Upper Body   Active ROM Upper Body  X   Dominant Hand Right   Comments proximal weakness at shoulders- R unable to lift past 45 degrees w/o assist from LUE, attempts to use compensatory muslces. Edu on continued AROM exercises   Strength Upper Body   Upper Body Strength  X   Gross Strength Generalized Weakness, Equal Bilaterally.    Comments BUEs limited proximally, RUE <full AROM at shoulder flexion/extension. Use of LUE to compensate to lift hand    Sitting Upper Body Exercises   Sitting Upper Body Exercises Yes   Comments 1 set of 5 scapular retraction, chest stretch, and edu on neck exercises (1 set of 5 for keeping head in midline)   Hand Strengthening   Comment encouraged to continue to squeeze red therapy sponge   Neuro-Muscular Treatments   Neuro-Muscular Treatments Anterior weight shift;Weight Shift Right;Weight Shift Left;Postural Facilitation;Verbal Cuing;Tactile Cuing   Other Treatments   Other Treatments Provided Pt now re-vented and fatigues quickly. Educated on shoulder exercises in supine as pt chest/shoulders are tight and req v/cs/edu on AROM exercises to keep head in midline; anterior R lean of neck   Balance   Sitting Balance (Static) Fair   Sitting Balance (Dynamic) Fair -   Weight Shift Sitting Fair   Skilled Intervention Verbal Cuing;Compensatory Strategies;Tactile Cuing   Comments limited by fatigue; refused STS   Bed Mobility    Supine to Sit Minimal Assist  (use of therapist hands; HOB elevated)   Sit to Supine Moderate Assist   Scooting Minimal Assist   Skilled Intervention Verbal Cuing;Tactile Cuing;Compensatory Strategies;Facilitation   Activities of Daily Living   Grooming Seated;Minimal Assist  (wiping face and oral care; min A for use of RUE)   Lower Body Dressing Maximal Assist   Toileting Moderate Assist  (nevarez and bedpan)   Skilled Intervention Verbal  Cuing;Tactile Cuing;Facilitation;Compensatory Strategies   Comments limited by fatigue   How much help from another person does the patient currently need...   Putting on and taking off regular lower body clothing? 2   Bathing (including washing, rinsing, and drying)? 2   Toileting, which includes using a toilet, bedpan, or urinal? 2   Putting on and taking off regular upper body clothing? 2   Taking care of personal grooming such as brushing teeth? 3   Eating meals? 1   6 Clicks Daily Activity Score 12   Functional Mobility   Mobility EOB only; fatigues quickly and declined STS   Skilled Intervention Verbal Cuing;Tactile Cuing;Facilitation;Compensatory Strategies   ICU Target Mobility Level   ICU Mobility - Targeted Level Level 2   Activity Tolerance   Sitting Edge of Bed 15 min   Comments limited by fatigue/lethargy   Patient / Family Goals   Patient / Family Goal #1 to get better   Short Term Goals   Short Term Goal # 1 LB dressing with min A   Goal Outcome # 1 Goal not met   Short Term Goal # 2 seated g/h with SPV   Goal Outcome # 2 Progressing as expected   Short Term Goal # 2 B  standing g/h with SPV   Goal Outcome # 2 B Goal not met   Short Term Goal # 3 bsc txf with mod A   Goal Outcome # 3 Goal not met   Short Term Goal # 3 B toilet txf with SPV and no v/cs for safety/technique   Goal Outcome # 3 B Goal not met

## 2021-05-22 ENCOUNTER — APPOINTMENT (OUTPATIENT)
Dept: RADIOLOGY | Facility: MEDICAL CENTER | Age: 51
DRG: 003 | End: 2021-05-22
Attending: INTERNAL MEDICINE
Payer: MEDICARE

## 2021-05-22 LAB
ANION GAP SERPL CALC-SCNC: 5 MMOL/L (ref 7–16)
BASOPHILS # BLD AUTO: 0.4 % (ref 0–1.8)
BASOPHILS # BLD: 0.03 K/UL (ref 0–0.12)
BUN SERPL-MCNC: 19 MG/DL (ref 8–22)
CALCIUM SERPL-MCNC: 8 MG/DL (ref 8.5–10.5)
CHLORIDE SERPL-SCNC: 102 MMOL/L (ref 96–112)
CO2 SERPL-SCNC: 29 MMOL/L (ref 20–33)
CREAT SERPL-MCNC: 0.18 MG/DL (ref 0.5–1.4)
EOSINOPHIL # BLD AUTO: 0.02 K/UL (ref 0–0.51)
EOSINOPHIL NFR BLD: 0.3 % (ref 0–6.9)
ERYTHROCYTE [DISTWIDTH] IN BLOOD BY AUTOMATED COUNT: 61.8 FL (ref 35.9–50)
GLUCOSE SERPL-MCNC: 94 MG/DL (ref 65–99)
HCT VFR BLD AUTO: 27.2 % (ref 37–47)
HGB BLD-MCNC: 8.3 G/DL (ref 12–16)
IMM GRANULOCYTES # BLD AUTO: 0.08 K/UL (ref 0–0.11)
IMM GRANULOCYTES NFR BLD AUTO: 1.1 % (ref 0–0.9)
LYMPHOCYTES # BLD AUTO: 1.26 K/UL (ref 1–4.8)
LYMPHOCYTES NFR BLD: 17.5 % (ref 22–41)
MAGNESIUM SERPL-MCNC: 1.7 MG/DL (ref 1.5–2.5)
MCH RBC QN AUTO: 28 PG (ref 27–33)
MCHC RBC AUTO-ENTMCNC: 30.5 G/DL (ref 33.6–35)
MCV RBC AUTO: 91.9 FL (ref 81.4–97.8)
MONOCYTES # BLD AUTO: 0.64 K/UL (ref 0–0.85)
MONOCYTES NFR BLD AUTO: 8.9 % (ref 0–13.4)
NEUTROPHILS # BLD AUTO: 5.15 K/UL (ref 2–7.15)
NEUTROPHILS NFR BLD: 71.8 % (ref 44–72)
NRBC # BLD AUTO: 0 K/UL
NRBC BLD-RTO: 0 /100 WBC
PHOSPHATE SERPL-MCNC: 3 MG/DL (ref 2.5–4.5)
PLATELET # BLD AUTO: 348 K/UL (ref 164–446)
PMV BLD AUTO: 9.5 FL (ref 9–12.9)
POTASSIUM SERPL-SCNC: 4.2 MMOL/L (ref 3.6–5.5)
RBC # BLD AUTO: 2.96 M/UL (ref 4.2–5.4)
SODIUM SERPL-SCNC: 136 MMOL/L (ref 135–145)
TRIGL SERPL-MCNC: 93 MG/DL (ref 0–149)
WBC # BLD AUTO: 7.2 K/UL (ref 4.8–10.8)

## 2021-05-22 PROCEDURE — 83735 ASSAY OF MAGNESIUM: CPT

## 2021-05-22 PROCEDURE — A9270 NON-COVERED ITEM OR SERVICE: HCPCS | Performed by: INTERNAL MEDICINE

## 2021-05-22 PROCEDURE — 0BC48ZZ EXTIRPATION OF MATTER FROM RIGHT UPPER LOBE BRONCHUS, VIA NATURAL OR ARTIFICIAL OPENING ENDOSCOPIC: ICD-10-PCS | Performed by: INTERNAL MEDICINE

## 2021-05-22 PROCEDURE — 700105 HCHG RX REV CODE 258: Performed by: INTERNAL MEDICINE

## 2021-05-22 PROCEDURE — 85025 COMPLETE CBC W/AUTO DIFF WBC: CPT

## 2021-05-22 PROCEDURE — 770022 HCHG ROOM/CARE - ICU (200)

## 2021-05-22 PROCEDURE — 31624 DX BRONCHOSCOPE/LAVAGE: CPT | Performed by: INTERNAL MEDICINE

## 2021-05-22 PROCEDURE — 302977 HCHG BRONCHOSCOPY PROC-THERAPEUTIC

## 2021-05-22 PROCEDURE — 84478 ASSAY OF TRIGLYCERIDES: CPT

## 2021-05-22 PROCEDURE — 94799 UNLISTED PULMONARY SVC/PX: CPT

## 2021-05-22 PROCEDURE — 94003 VENT MGMT INPAT SUBQ DAY: CPT

## 2021-05-22 PROCEDURE — 0B968ZZ DRAINAGE OF RIGHT LOWER LOBE BRONCHUS, VIA NATURAL OR ARTIFICIAL OPENING ENDOSCOPIC: ICD-10-PCS | Performed by: INTERNAL MEDICINE

## 2021-05-22 PROCEDURE — 700102 HCHG RX REV CODE 250 W/ 637 OVERRIDE(OP): Performed by: INTERNAL MEDICINE

## 2021-05-22 PROCEDURE — 99291 CRITICAL CARE FIRST HOUR: CPT | Mod: 25 | Performed by: INTERNAL MEDICINE

## 2021-05-22 PROCEDURE — 31645 BRNCHSC W/THER ASPIR 1ST: CPT | Performed by: INTERNAL MEDICINE

## 2021-05-22 PROCEDURE — 84100 ASSAY OF PHOSPHORUS: CPT

## 2021-05-22 PROCEDURE — 82330 ASSAY OF CALCIUM: CPT

## 2021-05-22 PROCEDURE — 700105 HCHG RX REV CODE 258: Performed by: PSYCHIATRY & NEUROLOGY

## 2021-05-22 PROCEDURE — 99152 MOD SED SAME PHYS/QHP 5/>YRS: CPT | Performed by: INTERNAL MEDICINE

## 2021-05-22 PROCEDURE — 0B9B8ZZ DRAINAGE OF LEFT LOWER LOBE BRONCHUS, VIA NATURAL OR ARTIFICIAL OPENING ENDOSCOPIC: ICD-10-PCS | Performed by: INTERNAL MEDICINE

## 2021-05-22 PROCEDURE — 700101 HCHG RX REV CODE 250: Performed by: PSYCHIATRY & NEUROLOGY

## 2021-05-22 PROCEDURE — 80048 BASIC METABOLIC PNL TOTAL CA: CPT

## 2021-05-22 PROCEDURE — 700101 HCHG RX REV CODE 250: Performed by: INTERNAL MEDICINE

## 2021-05-22 PROCEDURE — 700111 HCHG RX REV CODE 636 W/ 250 OVERRIDE (IP): Performed by: INTERNAL MEDICINE

## 2021-05-22 PROCEDURE — 71045 X-RAY EXAM CHEST 1 VIEW: CPT

## 2021-05-22 RX ORDER — MAGNESIUM SULFATE HEPTAHYDRATE 40 MG/ML
2 INJECTION, SOLUTION INTRAVENOUS ONCE
Status: COMPLETED | OUTPATIENT
Start: 2021-05-22 | End: 2021-05-22

## 2021-05-22 RX ORDER — HYDROMORPHONE HYDROCHLORIDE 2 MG/ML
2 INJECTION, SOLUTION INTRAMUSCULAR; INTRAVENOUS; SUBCUTANEOUS EVERY 4 HOURS PRN
Status: DISCONTINUED | OUTPATIENT
Start: 2021-05-22 | End: 2021-05-25

## 2021-05-22 RX ORDER — ROCURONIUM BROMIDE 10 MG/ML
50 INJECTION, SOLUTION INTRAVENOUS ONCE
Status: COMPLETED | OUTPATIENT
Start: 2021-05-22 | End: 2021-05-22

## 2021-05-22 RX ORDER — FAMOTIDINE 20 MG/1
20 TABLET, FILM COATED ORAL 2 TIMES DAILY
Status: DISCONTINUED | OUTPATIENT
Start: 2021-05-22 | End: 2021-05-25

## 2021-05-22 RX ORDER — PROPOFOL 10 MG/ML
0-200 INJECTION, EMULSION INTRAVENOUS ONCE
Status: COMPLETED | OUTPATIENT
Start: 2021-05-22 | End: 2021-05-22

## 2021-05-22 RX ORDER — DEXMEDETOMIDINE HYDROCHLORIDE 4 UG/ML
.1-1.5 INJECTION, SOLUTION INTRAVENOUS CONTINUOUS
Status: DISCONTINUED | OUTPATIENT
Start: 2021-05-22 | End: 2021-05-23

## 2021-05-22 RX ADMIN — HYDROMORPHONE HYDROCHLORIDE 2 MG: 2 INJECTION, SOLUTION INTRAMUSCULAR; INTRAVENOUS; SUBCUTANEOUS at 16:05

## 2021-05-22 RX ADMIN — MAGNESIUM SULFATE 2 G: 2 INJECTION INTRAVENOUS at 08:55

## 2021-05-22 RX ADMIN — DOCUSATE SODIUM 50 MG AND SENNOSIDES 8.6 MG 2 TABLET: 8.6; 5 TABLET, FILM COATED ORAL at 05:38

## 2021-05-22 RX ADMIN — PROPOFOL 40 MG: 10 INJECTION, EMULSION INTRAVENOUS at 14:27

## 2021-05-22 RX ADMIN — OXYCODONE 5 MG: 5 TABLET ORAL at 17:28

## 2021-05-22 RX ADMIN — DEXMEDETOMIDINE 0.4 MCG/KG/HR: 200 INJECTION, SOLUTION INTRAVENOUS at 09:41

## 2021-05-22 RX ADMIN — HYDROMORPHONE HYDROCHLORIDE 2 MG: 2 INJECTION, SOLUTION INTRAMUSCULAR; INTRAVENOUS; SUBCUTANEOUS at 11:06

## 2021-05-22 RX ADMIN — PROPOFOL 60 MCG/KG/MIN: 10 INJECTION, EMULSION INTRAVENOUS at 14:06

## 2021-05-22 RX ADMIN — RISPERIDONE 2 MG: 1 TABLET, FILM COATED ORAL at 17:28

## 2021-05-22 RX ADMIN — CALCIUM CHLORIDE 1000 MG: 100 INJECTION, SOLUTION INTRAVENOUS at 10:44

## 2021-05-22 RX ADMIN — HYDROMORPHONE HYDROCHLORIDE 2 MG: 2 INJECTION, SOLUTION INTRAMUSCULAR; INTRAVENOUS; SUBCUTANEOUS at 21:05

## 2021-05-22 RX ADMIN — MIDODRINE HYDROCHLORIDE 5 MG: 5 TABLET ORAL at 14:52

## 2021-05-22 RX ADMIN — PAROXETINE HYDROCHLORIDE 10 MG: 20 TABLET, FILM COATED ORAL at 05:38

## 2021-05-22 RX ADMIN — MEROPENEM 1 G: 1 INJECTION, POWDER, FOR SOLUTION INTRAVENOUS at 15:07

## 2021-05-22 RX ADMIN — MIDODRINE HYDROCHLORIDE 5 MG: 5 TABLET ORAL at 05:38

## 2021-05-22 RX ADMIN — LEVETIRACETAM 500 MG: 500 TABLET ORAL at 05:39

## 2021-05-22 RX ADMIN — DOCUSATE SODIUM 50 MG AND SENNOSIDES 8.6 MG 2 TABLET: 8.6; 5 TABLET, FILM COATED ORAL at 17:28

## 2021-05-22 RX ADMIN — METHADONE HYDROCHLORIDE 30 MG: 10 TABLET ORAL at 05:39

## 2021-05-22 RX ADMIN — ACETAMINOPHEN 500 MG: 325 TABLET, FILM COATED ORAL at 14:52

## 2021-05-22 RX ADMIN — GABAPENTIN 300 MG: 300 CAPSULE ORAL at 21:05

## 2021-05-22 RX ADMIN — DEXMEDETOMIDINE 0.3 MCG/KG/HR: 200 INJECTION, SOLUTION INTRAVENOUS at 16:12

## 2021-05-22 RX ADMIN — LEVETIRACETAM 500 MG: 500 TABLET ORAL at 17:28

## 2021-05-22 RX ADMIN — MEROPENEM 1 G: 1 INJECTION, POWDER, FOR SOLUTION INTRAVENOUS at 21:05

## 2021-05-22 RX ADMIN — HYDROMORPHONE HYDROCHLORIDE 2 MG: 2 INJECTION, SOLUTION INTRAMUSCULAR; INTRAVENOUS; SUBCUTANEOUS at 01:28

## 2021-05-22 RX ADMIN — MIDODRINE HYDROCHLORIDE 5 MG: 5 TABLET ORAL at 21:05

## 2021-05-22 RX ADMIN — HYDROMORPHONE HYDROCHLORIDE 2 MG: 2 INJECTION, SOLUTION INTRAMUSCULAR; INTRAVENOUS; SUBCUTANEOUS at 05:36

## 2021-05-22 RX ADMIN — PHENYLEPHRINE HYDROCHLORIDE 50 MCG/MIN: 10 INJECTION INTRAVENOUS at 14:06

## 2021-05-22 RX ADMIN — ACETAMINOPHEN 500 MG: 325 TABLET, FILM COATED ORAL at 21:07

## 2021-05-22 RX ADMIN — FAMOTIDINE 20 MG: 20 TABLET ORAL at 17:28

## 2021-05-22 RX ADMIN — MEROPENEM 1 G: 1 INJECTION, POWDER, FOR SOLUTION INTRAVENOUS at 07:24

## 2021-05-22 RX ADMIN — ACETAMINOPHEN 500 MG: 325 TABLET, FILM COATED ORAL at 08:55

## 2021-05-22 RX ADMIN — GABAPENTIN 300 MG: 300 CAPSULE ORAL at 14:51

## 2021-05-22 RX ADMIN — DULOXETINE HYDROCHLORIDE 60 MG: 60 CAPSULE, DELAYED RELEASE ORAL at 17:28

## 2021-05-22 RX ADMIN — ROCURONIUM BROMIDE 50 MG: 10 INJECTION, SOLUTION INTRAVENOUS at 14:16

## 2021-05-22 RX ADMIN — GABAPENTIN 300 MG: 300 CAPSULE ORAL at 05:39

## 2021-05-22 RX ADMIN — FAMOTIDINE 20 MG: 20 TABLET ORAL at 05:38

## 2021-05-22 RX ADMIN — RISPERIDONE 2 MG: 1 TABLET, FILM COATED ORAL at 05:36

## 2021-05-22 RX ADMIN — MAGNESIUM SULFATE 2 G: 2 INJECTION INTRAVENOUS at 12:38

## 2021-05-22 ASSESSMENT — FIBROSIS 4 INDEX: FIB4 SCORE: 0.43

## 2021-05-22 ASSESSMENT — PAIN DESCRIPTION - PAIN TYPE
TYPE: ACUTE PAIN;SURGICAL PAIN
TYPE: ACUTE PAIN
TYPE: ACUTE PAIN;SURGICAL PAIN
TYPE: ACUTE PAIN;SURGICAL PAIN
TYPE: ACUTE PAIN
TYPE: ACUTE PAIN;SURGICAL PAIN
TYPE: ACUTE PAIN;SURGICAL PAIN;CHRONIC PAIN
TYPE: ACUTE PAIN;SURGICAL PAIN

## 2021-05-22 NOTE — CARE PLAN
Problem: Pain Management  Goal: Pain level will decrease to patient's comfort goal  5/21/2021 2331 by Meri Rayo R.N.  Outcome: Progressing  5/21/2021 2331 by Meri Rayo R.N.  Outcome: Progressing  5/21/2021 2328 by Meri Rayo R.N.  Outcome: Progressing     Problem: Knowledge Deficit - Standard  Goal: Patient and family/care givers will demonstrate understanding of plan of care, disease process/condition, diagnostic tests and medications  5/21/2021 2331 by Meri Rayo R.N.  Outcome: Progressing  5/21/2021 2331 by Meri Rayo R.N.  Outcome: Progressing  5/21/2021 2328 by Meri Rayo R.N.  Outcome: Progressing     Problem: Fall Risk  Goal: Patient will remain free from falls  5/21/2021 2331 by Meri Rayo R.N.  Outcome: Progressing  5/21/2021 2331 by Meri Rayo R.N.  Outcome: Progressing  5/21/2021 2328 by Meri Rayo R.N.  Outcome: Progressing

## 2021-05-22 NOTE — CARE PLAN
Ventilator Daily Summary    Vent Day ? Trach Day 22 (Current Trach placed 5/1/21)    Ventilator settings changed this shift: None    Weaning trials: None; SPONT as Tolerated; ASV Resting    Respiratory Procedures: None    Plan: Continue current ventilator settings and wean mechanical ventilation as tolerated per physician orders.

## 2021-05-22 NOTE — CARE PLAN
Ventilator Daily Summary     Vent Day #4,  Trach day #22     Ventilator settings changed this shift: %/+8/40%     Weaning trials: SBT X 7 hrs 29 min on 5/8/40%     Respiratory Procedures: Bronch- therapeutic     Plan: Continue current ventilator settings and wean mechanical ventilation as tolerated per physician orders         Problem: Ventilation  Goal: Ability to achieve and maintain unassisted ventilation or tolerate decreased levels of ventilator support  Description: Document on Vent flowsheet    1.  Support and monitor invasive and noninvasive mechanical ventilation  2.  Monitor ventilator weaning response  3.  Perform ventilator associated pneumonia prevention interventions  4.  Manage ventilation therapy by monitoring diagnostic test results  Outcome: Progressing

## 2021-05-22 NOTE — PROCEDURES
Date: 5/22/2021    Procedure: Conscious sedation    Indication: persistent infiltrates needing bronchoscopy    Physician:  Juventino Lozano M.D.    Consent:  Patient and her     Procedure:  A time out occurred with the patient name, medical record number, allergies, and consent with right procedure and indication with bedside nurse and if able the patient. The patient was connected to a monitor and had continuous pulse oximeter and serial blood pressure measurement were done during the procedure. I performed the conscious sedation and was directly involved with administration of medication, monitoring airway, vital signs, preventing complications.  Administered of  Propofol gtt at 60 mcg/kg/min and 4ml IV then was given rocuronium total mg in titration fashion until achieving a level of moderate procedural sedation.  Patient tolerated procedure well without complications from sedation and was left in the care of his bedside nurse and respiratory therapy. Procedural sedation time equals 10 minutes which was separate from procedure time as described above.  Start time: 212 pm  Stop time: 222 pm    Juventino Lozano MD  Critical Care Medicine

## 2021-05-22 NOTE — PROCEDURES
Date: 5/22/2021    Procedure: Bronchoscopy with Therapeutic suctioning and BAL    Indication: Infiltrate/atlectasis    Physician:  Juventino Lozano M.D.    Consent:   and patient    Procedure:  A time out occurred with the patient name, medical record number, allergies, and consent with right procedure and indication with bedside nurse and if able the patient. The patient was connected to a monitor and had continuous pulse oximeter. The patient was sedated with propofol gtt and 4ml of IV propofol and given rocuronium. The bronchoscope was insert down the left and right bronchi to the terminal bronchioles. Therapeutic suction of secretion was provided RLL orifice with some mild boggy mucosa with some thin secretions, Right upper lobe with rare plugs distally clear grey, left lower lobe with some white secretions. The patient tolerated the procedure without any immediate complications with minimal <5ml of blood loss.     Findings benign appearing airway and no significant secretion or plugs.     Juventino Lozano MD  Critical Care Medicine

## 2021-05-22 NOTE — PROGRESS NOTES
Infectious Disease Daily Progress Note    Date of Service  5/22/2021    Chief Complaint  Meropenem 5/19-present     Previous ABX  Cefepime 4/23 5/18-  s/p 23 days  Cefazolin 5/18-5/19     Thoracoscopy & Decortication - 4/28  Decortication 5/17     PRIOR Rx:   Zosyn 4/22-4/23  Previous: Unasyn, Zosyn, Vanco, Daptomycin  Ceftaroline: start 4/13-4/15  Nafcillin 2g Q4 hrs 4/15-4/23 4/14: Unable to give name of previous NSG or neurologist. Seems confused, but able to say some sentences fluently.   4/15: Line cultures now growing MSSA. As well as from the blood. Repeat blood culture 4/13+ in both sets. Patient has worsening confusion. CSF fluid analyses suggest pleocytosis. Cultures are no growth from the CSF. Chest CT was ordered this morning indicating a loculated right pleural effusion as well as bilateral septic emboli. Dr. Mack spoke with Dr. Oh yesterday and no surgical intervention unless clinical deterioration.  4/16: Increased respiratory distress.  Tachypneic.  CT with loculated collection.  Dr Resendiz not available.  No thoracic surgeon available.  Plans to have pulmonary place CT.  Transferring to ICU.  4/17: Transferred to [facility] last night. Hgb dropped to 6.4 requiring PRBC transfusion. Requiring 2 pressors. Fio2 50%. Febrile 38.8. Pending OR decortication of empyema and hemothorax. Chest tube [facility] at [facility] shows 8,371 WBC, ,000, 74% N  4/18: Chest tube was upsized by surgery yesterday, no decortication. WBC 22k. Fio2 40%. Vasopressin. Levophed down to 2mcg. Afebrile 24 hrs. Last fever 38.6 on 4/16.   4/19: Still on vent. Levo 3 mcg, vasopressin. Afebrile 72 hours. WBC 21k. BC 4/17 NGTD x 48 hours. Unable to do MRI brain given neurostimulator per RN.   4/20: Remaining afebrile. Hb 6.2 and undergoing transfusion today.WBC 24,100, 5% bands.1700 ml CT output. Copious bloody ET secretions. No pressors. Receiving dornase and TPA per CT. Right pleural effusion not large per CXR today.   4/21: WBC  31k. Bronchoscopy yesterday showing blood. Cultures sent. TPA on hold per RN due to arvind blood from chest tube requiring PRBC transfusion for hgb 6. Pending chest CT today. Per , spinal stimulator is non-MRI compatible. Levophed 10mcg. 30% Fio2. Afebrile.   4/22: Fever 101.3 this am. WBC 20,300 down from 32,200 yesterday. BAL growing Klebsiella pneumoniae but susceptibility panel not set up until today. CTA of brain shows no lesion. CT of chest shows enlarging cavitary lung lesions bilat and large loculated new left pleural effusion and large hydropneumothorax on right. TPA & dornase infusions of right chest ended 4/20 after considerable bleeding requiring transfusion. Hb now down again to 6.8.  4/23: WBC 23k. Fio2 40%. Tmax 38.1. US of stiumlator shows small fluid collection but no drainable abscess. Pending MICAH today. Pending L chest tube placement  4/24: Continue fever 100.8-101.8. WBC 16,600. MICAH shows large vegetations on both tricuspid valve and mitral valve with mild TR & MR.  No aortic root abscess. Left chest tube placed yesterday yielding 8 ml of old bloody fluid. Bronch today revealed copious thick maroon secretions in distal trachea and both mainstem bronchi. On the right these were obstructive. Remaining off pressors. Last positive blood cultures (MSSA) were 4/16, negative 4/17.  4/25: Fever 100.6 this AM. wBC 13,300. Hb 6.6. CT: right hydropneumothorax increasing, right bronchopleural fistula, mediastinal shift ot the left , multiple cavitary pulmonary nodules, 3.1 cm left basilar mass, splenomegaly. CT of head shows dural thickening over the clivus. Lac+ GNR >100,000 col/ml growing from BAL of 4/24, presumed Klebsiella. Left peural fluid culture negative from 4/23.  4/26: Fever 100.4 last night. WBC 13,500. Hb 7.4. Plts 502,000. ESR >120. UA fairly clear except 30 mg% protein, 2-5 wbc and rare rbc. CXR unchanged except more prominent pulmonary edema. GNR in BAL may be a different species of  Klebsiella, and resistant to ampicillin & tmp-sulfa; confirmation pending.  4/28: Fever 100.8 last night. WBC 27,700. Hb 6.6. Plts 482,000. Creat 0.19. Kleb pneum in BAL on 4/24 is resistant to ampicillin & tmp-sulfa but otherwise sensitive. O2 sat 96% on FIO2 30% now, PEEP 8. Has been re-evaluated by thoracic surgeon, Dr. Ganser, who believes she will not wean without decortication on the right. Surgery anticipated today.  4/29: S/P right thoracoscopy with decortication with cultures NG at 24 hrs.  4/30: Had a bronch due to hypoxia and hypotension. CXR with worsening right hemithorax pulmonary opacities. Bloody mucous plugs removed. Pressors started. Febrile this am. Tachy in 130s. Pressors just removed. Tolerating vent, but not a SBT candidate at the moment.  5/1: Small air leak CT-2 every ~ 2/3 breathes. The K. pneumoniae from her thoracoscopy is sensitive to her cefepime so no antibiotic change needed.   5/2: No air leak today she tolerated 2  hrs of spontaneous breathing with support today.      5/3: Second day with SBT and doing well now for 2 hrs. Slight bump in temperature and      WBC's but no obvious clinical infectious changed so watch closely.      5/4: Fever still from time to time. WBCs trending up. Chest tubes in place. Trach.       5/5: She clearly is better today she was sitting up in bed with open eyes and follows commands. She still has low grade fever but her WBC's are dropping. Cefepime dose increased yesterday for increased CNS coverage if necessary. She will probably need further thoracic surgery as mentioned by Dr. Ganser. The issue of what to do with her stimulator is still up in the air for now as it probably will need to be removed but she is nort a candidate for more major surgery at this time.       5/7: She continues to improve and under go longer SBT trials. She just had a new PICC placed in her right arm and plan is to D/C central line.        5/8: PICC placed. No fevers. Comfortable.  Cortrak placed. Failing SBTs.  5/9: No acute issues. No fevers. Comfortable. Family at bedside.   5/10: No acute issues, fevers or WBC bumps. No changes in condition. She is to get her CT scan today and be reviewed by surgery  5/11:  at bedside.  Has been referred to LANE.  Repeat CT scan completed.  Results noted.  ? If Dr Ganser has seen.  Still with an empyema:  ? If candidate for further surgery.  5/12: Pending next thoracic surgery. No fever. Anxious.  5/13: ? Surgery date.  No one seems to know.  Had speaking valve in and having swallow eval with speech therapy  5/14: No acute issues. No fevers. Surgery Monday.  5/15: One CT accidentally pulled out yesterday.  Has been on T piece and tolerating. Plans for surgery Monday afternoon.  5/16: On schedule for surgery 5/17: 4:30 pm.  Moved to room T614.  No new issues.  5/17: Was sitting up in the bedside chair since change of shift.  Now walking in hallways with RN and CNA.  5/18: Decortication yesterday with 2 chest tube placement. WBC improving 21->14k  5/19: Hypotension and tachycardia.  Decreased H/H.  Placed back on vent to rest.  Dr Lozano in process of placing new line.  Antibiotics were deesculated yesterday to Cefazolin.  No cx were taken at surgery.  5/20: Pt was febrile yest afternoon 38.2, but now afebrile overnight after meropenem. WBC improved 11k->8k. Fio2 30%. Phenylephrine now off this morning. CXR shows new R consolidations.   5/21: Off pressors.  On vent: FiO2 30%, PEEP 8, PS 5 Received pRBCs yesterday.  5/22: Remains afebrile, off pressors. FiO2 30%.     Review of Systems  Review of Systems   Unable to perform ROS: Acuity of condition        Physical Exam  Temp:  [36.7 °C (98 °F)-37.2 °C (99 °F)] 36.8 °C (98.2 °F)  Pulse:  [] 96  Resp:  [10-38] 38  BP: ()/(39-84) 135/72  SpO2:  [95 %-100 %] 100 %    Physical Exam  Constitutional:       Comments: awake non verbal  HENT:      Head:      Comments: NG tube  Cardiovascular:      Rate  and Rhythm: Normal rate and regular rhythm.      Heart sounds: No murmur heard.     Pulmonary:      Comments: Diminished on R. 2 chest tubes with serosanguinous output.   Abdominal:      General: Abdomen is flat. Bowel sounds are normal. There is no distension.   Neurological:      Comments: awake   Fluids    Intake/Output Summary (Last 24 hours) at 5/22/2021 0638  Last data filed at 5/22/2021 0600  Gross per 24 hour   Intake 1689.4 ml   Output 868 ml   Net 821.4 ml       Laboratory  Recent Labs     05/20/21  1827 05/21/21  0520 05/22/21  0344   WBC 6.9 9.8 7.2   RBC 2.31* 3.13* 2.96*   HEMOGLOBIN 6.9* 8.9* 8.3*   HEMATOCRIT 21.7* 28.1* 27.2*   MCV 93.9 89.8 91.9   MCH 29.9 28.4 28.0   MCHC 31.8* 31.7* 30.5*   RDW 56.6* 62.1* 61.8*   PLATELETCT 246 326 348   MPV 9.9 9.9 9.5     Recent Labs     05/20/21  0550 05/21/21  0520 05/22/21  0344   SODIUM 143 136 136   POTASSIUM 3.8 3.9 4.2   CHLORIDE 109 105 102   CO2 28 26 29   GLUCOSE 105* 105* 94   BUN 26* 27* 19   CREATININE 0.18* 0.19* 0.18*   CALCIUM 8.4* 8.4* 8.0*                  Impression   -Severe sepsis  -Catheter related bloodstream infection due to infected port status post removal 4/12-staph aureus  -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       - Acute left empyema s/p chest tube placement 4/23, decortication 5/17 - Klebsiella  -Multiple acute septic pulmonary emboli bilaterally  -Acute bilateral pneumonia due to above     --Pulmonary edema  -Pseudotumor cerebri with underlying  shunt: possible arachnoiditis  -Chronic migraine headaches  -History of autoimmune vasculitis  -Elevated alkaline phosphatase & bilirubin  -Acute encephalopathy due to sepsis      - anemia due to above          - acute fever on 5/19 likely from secondary VAP          - Secondary VAP R sided pneumonia          - decubitus ulcer sacral.      Plan   No surgical  cultures were taken on last decortication. Tracheal aspirate recently 5/19 no growth, blood cultures 5/19 no growth so far. CXR shows worsening R sided infiltrates concerning for secondary infections after reviewing her CXR's between 5/18 and 5/19.  Repeated sputum cultures still exhibiting her original Klebsiella empyema infection. She improved post operatively with defervescence and pressor weaning while on meropenem. Suggestive of either time, or an anaerobic infection that we werent covering. Continue meropenem for 7 days, then switch back to cefazolin or unasyn afterwards      - cont meropenem for now and broadened coverage for secondary VAP for 7 days. Target date 5/26.  - Afterwards, revert back to either cefazolin or unasyn for her Kleb empyema and MSSA endocarditis treatment 4 weeks after decortication. Target date 6/14.   - Will need repeat Chest CT right before completion of antibiotics  - after completion of IV abx  Will need suppression therapy afterward for her HW (spinal stimulator,  shunt  targeting MSSA  With keflex).  Will continue Meropenem for now.  Make certain she continues to improve before considering attempt to deesculate again.  - vent management per pulmonary  - monitor chest tube output     Disussed with RN and Dr Lozano  35 min spent in critical care management without overlap coordinating care/counseling.

## 2021-05-22 NOTE — PROGRESS NOTES
"Critical Care Progress Note    Date of admission  4/16/2021    Chief Complaint  \"50 y.o. female the past medical history of pseudotumor cerebri status post  shunt by Dr. Oh, chronic pain with history of placement of nerve stimulator, vasculitis, right anterior chest port for home IV meds with recurrent infection who presented 4/11/2021 with to [facility] with recurrent port infection. Found to have MSSA bacteremia, endocarditis, septic pulmonary emboli/empyema/pneumatocele, and CSF pleocytosis concerning for  shunt involvement.  Seen by Dr. Oh, NSGY, at [facility] who did not recommend  shunt removal.  Seen by Dr. Vargas here for concern of fluid collection around her spinal stimulator and he recommended against removal. Remains intubated, encephalopathic.\"  5/17:Critical care signing back on tonight after total right lung decortication for organized empyema  She was recovered in PACU, and is now on T-piece.  Follow-up ABG showed mild respiratory acidosis  She has 2 right chest tubes with marked improvement in aeration of right lung postoperatively, no pneumothorax on CXR  Minimize sedation, dexmedetomidine tonight if needed, pulmonary hygiene, follow-up arterial blood gas, positive airway pressure if needed     Hospital Course  4/16 admitted to ICU  4/17 VD 2, 2 FFP, pRBC overnight; new chest tube per gen surg  4/18 VD 3, TPa/Dornase with 1400 cc from R chest tube  4/26 R chest tube not functioning, d/c it.  broke his leg and going to OR today, so not available currently.  VD 11.  8/30%. RSBI immediately high on SBT attempt. Followed commands for RN  4/27 VD12. 8/30%. Not tolerating wean, high RSBI and tachycardic.   4/28 VD 13. VATS with Dr. Ganser.    4/29 VD 14. 8/30%. Still not tolerating wean d/t severe tachycardia just with SAT. Trialed precedex and she required fentanyl up to 600/hr to tolerate, so back on propofol. Chest tubes -40  4/30 VD 15. 8/30%. Try ketamine/versed as sedative. Severe " tachycardia and HTN with weaning down propofol. Plan for perc trach tomorrow. Chest tubes -40  5/1 perc trach. PICC placed. Chest tubes remain -40.  5/2 Able to spont well on sedation, but very tachy/HTN when sedation is turned down.  5/3 midazolam weaned down to 3mg/min  5/4 off versed and ketamine gtt  5/5 trials of SBT w/ trach  5/6 T piece trial during day, mobilize to chair x 2, rest vent 8/8 at night, started methadone, gabapentin   5/7 T piece during day with, rest PS 8/8 overnight, spacing out dilaudid PRNs  5/8 T-piece during day, still with anxiety weaning dex gtt  5/10 CT chest  5/11 will require thoracotomy and decortication  5/12 off ventilator x 24 hours  5/13 replete magnesium, remains on T-piece, ambulating, transfer to med/surg  5/17 -right thoracotomy with total lung decortication, remains on T-piece postoperatively did well  5/18 on T piece but with increase tachycardia 2l IV fluids, increase pain regime, lethargic just asking for dilaudid  5/19 am shock, bolused fluid and gave blood, place on vent and checked multiple labs, stress dose steroids, inc antibiotic coverage re-cultured, ill appearing, was on pressor and febrile  5/20 on vent to keep right lung open, 1 prbc, low dose pressor occaisional  5/21 on vent sponting well, 1 prbc given, off pressor/started midodrine, klebsiella on meropenem, CTx2 with minimal output, wound vac    Overnight events/rouding:  Neuro: Intact moves all follows commands  HR: snr 80-90's  SBP:   Tmax: afebrile  GI: BM, TF at goal  UOP: good   Lines: central line, picc   Resp: vent 4 trach 22 spont 5/8 small secretions   Vte: lovenox  PPI/H2:pepcid  Antibx: meropenem    Discussed with ID continue 7 days of meropenem  CXR reviewed and worse on right side  Mag replace -> 4gram  Ionized calcium -> give calcium  Plan for bronchoscopy discussed with  agrees will come to hospital today    Review of Systems  Review of Systems   Unable to perform ROS: Intubated         Vital Signs for last 24 hours   Temp:  [36.7 °C (98 °F)-37.2 °C (99 °F)] 36.8 °C (98.2 °F)  Pulse:  [] 96  Resp:  [10-38] 38  BP: ()/(39-84) 135/72  SpO2:  [96 %-100 %] 100 %    Hemodynamic parameters for last 24 hours       Respiratory Information for the last 24 hours  Vent Mode: Spont  PEEP/CPAP: 8  P Support: 5  MAP: 9.5  Length of Weaning Trial (Hours): on going  Control VTE (exp VT): 331    Physical Exam   Physical Exam  Vitals and nursing note reviewed.   Constitutional:       General: She is not in acute distress.     Appearance: She is ill-appearing. She is not toxic-appearing or diaphoretic.      Comments: Laying in bed chronic ill appearing improved color   HENT:      Head: Normocephalic and atraumatic.      Comments: Purple hair     Nose: Nose normal.      Comments: cortrac     Mouth/Throat:      Mouth: Mucous membranes are moist.      Pharynx: Oropharynx is clear.   Eyes:      Pupils: Pupils are equal, round, and reactive to light.   Neck:      Comments: Tracheostomy in place on vent  Cardiovascular:      Rate and Rhythm: Regular rhythm. Tachycardia present.      Pulses: Normal pulses.      Heart sounds: Normal heart sounds.   Pulmonary:      Comments: Coarse mechanical breath bilateral R>L, CT x2 right chest with wound vac in place no redness around CT or vac, tolerating SBT  Abdominal:      General: There is no distension.      Palpations: Abdomen is soft. There is no mass.      Tenderness: There is no abdominal tenderness. There is no guarding or rebound.      Hernia: No hernia is present.   Musculoskeletal:         General: No swelling or tenderness. Normal range of motion.      Cervical back: Normal range of motion and neck supple.   Skin:     General: Skin is warm and dry.      Capillary Refill: Capillary refill takes less than 2 seconds.      Coloration: Skin is pale. Skin is not jaundiced.      Findings: No bruising or erythema.   Neurological:      General: No focal deficit  present.      Comments: Still lethargic but improved, follows commands, moves all extremities, mouths words         Medications  Current Facility-Administered Medications   Medication Dose Route Frequency Provider Last Rate Last Admin   • HYDROmorphone (DILAUDID) injection 2 mg  2 mg Intravenous Q4HRS PRN Juventino Lozano M.D.       • famotidine (PEPCID) tablet 20 mg  20 mg Enteral Tube BID Juventino Lozano M.D.       • magnesium sulfate IVPB premix 2 g  2 g Intravenous Once Juventino Lozano M.D.   Stopped at 05/22/21 1055   • magnesium sulfate IVPB premix 2 g  2 g Intravenous Once Juventino Lozano M.D.       • enoxaparin (LOVENOX) inj 40 mg  40 mg Subcutaneous DAILY Juventino Lozano M.D.       • midodrine (PROAMATINE) tablet 5 mg  5 mg Enteral Tube Q8HRS Juventino Lozano M.D.   5 mg at 05/22/21 0538   • dexmedetomidine (PRECEDEX) 400 mcg/100mL NS premix infusion  0.1-1.5 mcg/kg/hr Intravenous Continuous Sergio Mireles M.D. 6.3 mL/hr at 05/22/21 0941 0.4 mcg/kg/hr at 05/22/21 0941   • phenylephrine (VEDA-SYNEPHRINE) 40 mg in  mL Infusion  0-300 mcg/min Intravenous Continuous Juventino Lozano M.D.   Stopped at 05/21/21 0500   • meropenem (MERREM) 1 g in  mL IVPB  1,000 mg Intravenous Q8HRS Dixie Albarran M.D.   Stopped at 05/22/21 0754   • Respiratory Therapy Consult   Nebulization Continuous RT Juventino Lozano M.D.       • MD Alert...ICU Electrolyte Replacement per Pharmacy   Other PHARMACY TO DOSE Juventino Lozano M.D.       • lidocaine (XYLOCAINE) 1 % injection 1-2 mL  1-2 mL Tracheal Tube Q30 MIN PRN Juventino Lozano M.D.       • levETIRAcetam (KEPPRA) tablet 500 mg  500 mg Enteral Tube BID Juventino Lozano M.D.   500 mg at 05/22/21 0539   • gabapentin (NEURONTIN) capsule 300 mg  300 mg Enteral Tube Q8HRS Juventino Lozano M.D.   300 mg at 05/22/21 0539   • methadone (DOLOPHINE) tablet 30 mg  30 mg Enteral Tube DAILY Juventino Lozano M.D.   30 mg at 05/22/21 0539   • diazePAM (VALIUM)  tablet 5 mg  5 mg Enteral Tube QHS PRN Raphael Armstrong M.D.   5 mg at 05/19/21 2136   • ondansetron (ZOFRAN) syringe/vial injection 4 mg  4 mg Intravenous Q4HRS PRN Juventino Lozano M.D.   4 mg at 05/13/21 0431   • acetaminophen (TYLENOL) tablet 500 mg  500 mg Enteral Tube TID Juventino Lozano M.D.   500 mg at 05/22/21 0855   • oxyCODONE immediate-release (ROXICODONE) tablet 5 mg  5 mg Enteral Tube Q4HRS PRN Juventino Lozano M.D.   5 mg at 05/19/21 2032   • labetalol (NORMODYNE/TRANDATE) injection 10 mg  10 mg Intravenous Q4HRS PRN Juventino Lozano M.D.   10 mg at 05/04/21 0226   • Pharmacy Consult: Enteral tube insertion - review meds/change route/product selection  1 Each Other PHARMACY TO DOSE Juventino Lozano M.D.       • acetaminophen (Tylenol) tablet 650 mg  650 mg Enteral Tube Q4HRS PRN Juventino Lozano M.D.   650 mg at 05/12/21 1233   • magnesium hydroxide (MILK OF MAGNESIA) suspension 30 mL  30 mL Enteral Tube QDAY PRN Juventino Lozano M.D.        And   • senna-docusate (PERICOLACE or SENOKOT S) 8.6-50 MG per tablet 2 tablet  2 tablet Enteral Tube BID Juventino Lozano M.D.   2 tablet at 05/22/21 0538    And   • polyethylene glycol/lytes (MIRALAX) PACKET 1 Packet  1 Packet Enteral Tube QDAY PRELISE Lozano M.D.   1 Packet at 05/20/21 0505    And   • bisacodyl (DULCOLAX) suppository 10 mg  10 mg Rectal QDAY PRN Juventino Lozano M.D.       • DULoxetine (CYMBALTA) capsule 60 mg  60 mg Enteral Tube BID Elliott Salvador M.D.   60 mg at 05/21/21 1711   • PARoxetine (PAXIL) tablet 10 mg  10 mg Enteral Tube QAM Elliott Salvador M.D.   10 mg at 05/22/21 0538   • risperiDONE (RISPERDAL) tablet 2 mg  2 mg Enteral Tube BID Elliott Salvador M.D.   2 mg at 05/22/21 0536   • ipratropium-albuterol (DUONEB) nebulizer solution  3 mL Nebulization Q2HRS PRN (RT) Juventino Lozano M.D.           Fluids    Intake/Output Summary (Last 24 hours) at 5/22/2021 1052  Last data filed at 5/22/2021 0600  Gross per  24 hour   Intake 1335.68 ml   Output 668 ml   Net 667.68 ml       Laboratory  Recent Labs     05/20/21  0702 05/21/21  0327   ISTATAPH 7.458 7.461   ISTATAPCO2 39.0* 38.2*   ISTATAPO2 74 81   ISTATATCO2 29 28   [id number] 95 97   ISTATARTHCO3 27.6* 27.3*   ISTATARTBE 3 3   ISTATTEMP 98.0 F 37.0 C   ISTATFIO2 30 30   ISTATSPEC Arterial Arterial   ISTATAPHTC 7.463 7.461   [id number] 73 81         Recent Labs     05/20/21  0550 05/21/21 0520 05/22/21  0344   SODIUM 143 136 136   POTASSIUM 3.8 3.9 4.2   CHLORIDE 109 105 102   CO2 28 26 29   BUN 26* 27* 19   CREATININE 0.18* 0.19* 0.18*   MAGNESIUM 1.7 1.6 1.7   PHOSPHORUS 2.2* 3.0 3.0   CALCIUM 8.4* 8.4* 8.0*     Recent Labs     05/20/21 0550 05/21/21 0520 05/22/21  0344   GLUCOSE 105* 105* 94     Recent Labs     05/20/21 0550 05/20/21  0550 05/20/21 1827 05/21/21 0520 05/22/21  0344   WBC 9.6   < > 6.9 9.8 7.2   NEUTSPOLYS 72.70*  --   --  77.30* 71.80   LYMPHOCYTES 15.60*  --   --  14.90* 17.50*   MONOCYTES 10.90  --   --  6.50 8.90   EOSINOPHILS 0.10  --   --  0.20 0.30   BASOPHILS 0.10  --   --  0.30 0.40    < > = values in this interval not displayed.     Recent Labs     05/20/21 1827 05/21/21 0520 05/22/21  0344   RBC 2.31* 3.13* 2.96*   HEMOGLOBIN 6.9* 8.9* 8.3*   HEMATOCRIT 21.7* 28.1* 27.2*   PLATELETCT 246 326 348       Imaging  X-Ray:  I have personally reviewed the images and compared with prior images.    Assessment/Plan  * Empyema of right pleural space (HCC)- (present on admission)  Assessment & Plan  R empyema due to septic pulmonary emboli  4/16 Chest tube placement at [facility]  4/18 Upsized by Dr Benedict  4/18-4/20 Received TPA/dornase.  Stopped b/c Hgb dropped requiring transfusion  4/26 was discontinued due to stopped functioning   4/28 s/p right VATS and decortication. Cx grew of Klebsiella pneumonia  5/17 total right lung decortication for organized empyema, right chest tubes x2    Bacteremia due to methicillin susceptible Staphylococcus aureus  (MSSA)- (present on admission)  Assessment & Plan  Source presumed right anterior chest line/port with endocarditis  Multiple sources for MSSA including MV/TV vegetation, port, spinal stimulator/ shunt  Status post port removal at [facility]  Last positive blood cultures were 4/17  Status post treatment with nafcillin from 4/16-4/23  ID following, continue cefepime    Acute bacterial endocarditis- (present on admission)  Assessment & Plan  MSSA bacteremia at OSH. First negative blood cultures were on 4/17  Infected port removed at [facility]   shunt and spinal stimulator could be seeded, NSGY has consulted on both and don't recommend removal at admission due to HD instability.   MV and TV vegetations with septic pulmonary emboli  Nataly ID following, antibiotics escalated to cefepime for VAP and klebsiella on prior pleural fluid  Follow-up cultures from decortication 5/17 -> none taken    Acute respiratory failure with hypoxia (HCC)- (present on admission)  Assessment & Plan  4/15 Intubated for acute hypoxic respiratory failure at [facility] due to hydro/pneumo, trapped lung, lung abscesses. 5/1 s/p percutaneous tracheostomy  Place back on ventilator for peep and recruitment and rest with her acute shock 5/19 gas exchange good  Monitor timing of attempt t-piece trials  5/21 SBT trials monitor for de-recruitment on CXR  Plan for bronchoscopy 5/21      Sepsis (HCC)  Assessment & Plan        Acute encephalopathy- (present on admission)  Assessment & Plan  Reorient and maintain sleep/wake cycle, mobilize  Limit sedative when able  Difficult to treat anxiety and acute/chronic pain    Pleural effusion, left  Assessment & Plan  Parapneumonic fluid from abscesses  CT guided chest tube placed 4/23  TPA/dornase one dose on 4/24.  Hold d/t dropping Hgb  CT chest 4/25 with resolution of fluid  CT clamped 4/29-no significant accumulation of fluid so d/c 4/30  Monitor for now radiographically    Pneumonia  Assessment & Plan  MSSA septic emboli  with empyema   BAL 4/20 klebsiella pneumonia  BAL 4/24 still heavy growth of klebsiella  4/28 OR pleural fluid specimen growing klebsiella.   Continue cefepime per ID -> meropenem for 7 days and then back to cefepimine    Tachycardia  Assessment & Plan  Persistent, sinus tach  Treat underlying causes    S/P  shunt- (present on admission)  Assessment & Plan  History of pseudotumor cerebri  History of  shunt by Dr. Oh  Could be seeded by MSSA, Dr. Oh consulted at [facility], recommended MRI but unable given her spinal stimulator    Shock (HCC)  Assessment & Plan  Undifferentiated acute am of 5/19   Fluid bolus, PRBC blood cultures, sputum, ID follow with broadening antibiotics  Lactate normal  Hg 6.5 will serial monitor hg, No coagulopathy  emperic rx with stress dose steroids and cortisol pending  Passive leg raise unremarkable  Worsening infiltrates and sputum on right lung place on ventilator    Debility  Assessment & Plan  PT/OT/SLP eval    Elevated alkaline phosphatase level- (present on admission)  Assessment & Plan  Unchanged --> recheck tomorrow  With dilated biliary ducts on imaging, stable  Unable to get MRCP due to nerve stimulator, ?  Eventual ERCP    Goals of care, counseling/discussion  Assessment & Plan  Palliative consulted  Full code    Spinal cord stimulator status  Assessment & Plan  Patient's stimulator is [employer]. It is FDA approved as MRI compatible, but the company has gone out of business, so there is no support team to assist with MRI.  Thus, if MRI were performed, our radiologists would need to protocol it correctly to manage the stimulator. The former rep from [employer] is Andre, [phone].  Information obtained from Dr. Mahoney's office, [phone].    Per  (5/4/2021), it is not MRI compatible unfortunately.     Anasarca  Assessment & Plan  Secondary to chronic critical illness  Optimize nutrition and volume status    Anemia  Assessment & Plan  Daily CBC with conservative  transfusion strategy  5/19 transfusion 1 prbc, 5/20 1 prbc, 5/21 1 prbc  Continue to monitor for bleeding in right lung CT's are minimal  Restart vte prophy monitor closely for bleeding after bronchoscopy later today 5/21    Prolonged Q-T interval on ECG- (present on admission)  Assessment & Plan  Optimize electrolytes, continuous telemetry monitoring  Avoid QT prolonging medications as able    Chronic pain syndrome- (present on admission)  Assessment & Plan  Chronic back pain and intractable headaches  History of nerve stimulator   Dr Vargas consulted at admission, no plan to remove stimulator due to her clinical instability  Multimodal with scheduled oxycodone, gabapentin, methadone (increased dose 5/11)    Hypomagnesemia  Assessment & Plan  Magnesium 1.8  Replete to keep > 2.0    Hypokalemia  Assessment & Plan  Keep > 4.0    Anxiety- (present on admission)  Assessment & Plan  Improving  Continue Depakote, paxil, cymbalta, gabapentin  Limited as needed benzodiazepines    I have performed a physical exam and reviewed and updated ROS and Plan today (5/22/2021). In review of yesterday's note (5/21/2021), there are no changes except as documented above.     Discussed patient condition and risk of morbidity and/or mortality with RN, RT, Pharmacy, Charge nurse / hot rounds, Patient and general surgery      Patient remains in critical condition from needing ventilator support. Critical care time provided was 38 minutes. This excludes all separate billable procedures.

## 2021-05-23 LAB
ANION GAP SERPL CALC-SCNC: 6 MMOL/L (ref 7–16)
BASOPHILS # BLD AUTO: 0.4 % (ref 0–1.8)
BASOPHILS # BLD: 0.03 K/UL (ref 0–0.12)
BUN SERPL-MCNC: 13 MG/DL (ref 8–22)
CA-I SERPL-SCNC: 1.1 MMOL/L (ref 1.1–1.3)
CALCIUM SERPL-MCNC: 8 MG/DL (ref 8.5–10.5)
CHLORIDE SERPL-SCNC: 104 MMOL/L (ref 96–112)
CO2 SERPL-SCNC: 26 MMOL/L (ref 20–33)
CREAT SERPL-MCNC: <0.17 MG/DL (ref 0.5–1.4)
EOSINOPHIL # BLD AUTO: 0.01 K/UL (ref 0–0.51)
EOSINOPHIL NFR BLD: 0.1 % (ref 0–6.9)
ERYTHROCYTE [DISTWIDTH] IN BLOOD BY AUTOMATED COUNT: 58.7 FL (ref 35.9–50)
GLUCOSE SERPL-MCNC: 89 MG/DL (ref 65–99)
HCT VFR BLD AUTO: 26.5 % (ref 37–47)
HGB BLD-MCNC: 8.2 G/DL (ref 12–16)
IMM GRANULOCYTES # BLD AUTO: 0.09 K/UL (ref 0–0.11)
IMM GRANULOCYTES NFR BLD AUTO: 1.3 % (ref 0–0.9)
LYMPHOCYTES # BLD AUTO: 1.29 K/UL (ref 1–4.8)
LYMPHOCYTES NFR BLD: 18.9 % (ref 22–41)
MAGNESIUM SERPL-MCNC: 1.7 MG/DL (ref 1.5–2.5)
MCH RBC QN AUTO: 28.7 PG (ref 27–33)
MCHC RBC AUTO-ENTMCNC: 30.9 G/DL (ref 33.6–35)
MCV RBC AUTO: 92.7 FL (ref 81.4–97.8)
MONOCYTES # BLD AUTO: 0.61 K/UL (ref 0–0.85)
MONOCYTES NFR BLD AUTO: 8.9 % (ref 0–13.4)
NEUTROPHILS # BLD AUTO: 4.79 K/UL (ref 2–7.15)
NEUTROPHILS NFR BLD: 70.4 % (ref 44–72)
NRBC # BLD AUTO: 0 K/UL
NRBC BLD-RTO: 0 /100 WBC
PLATELET # BLD AUTO: 377 K/UL (ref 164–446)
PMV BLD AUTO: 9.4 FL (ref 9–12.9)
POTASSIUM SERPL-SCNC: 3.7 MMOL/L (ref 3.6–5.5)
RBC # BLD AUTO: 2.86 M/UL (ref 4.2–5.4)
SODIUM SERPL-SCNC: 136 MMOL/L (ref 135–145)
WBC # BLD AUTO: 6.8 K/UL (ref 4.8–10.8)

## 2021-05-23 PROCEDURE — 94799 UNLISTED PULMONARY SVC/PX: CPT

## 2021-05-23 PROCEDURE — 700102 HCHG RX REV CODE 250 W/ 637 OVERRIDE(OP): Performed by: INTERNAL MEDICINE

## 2021-05-23 PROCEDURE — A9270 NON-COVERED ITEM OR SERVICE: HCPCS | Performed by: INTERNAL MEDICINE

## 2021-05-23 PROCEDURE — 94003 VENT MGMT INPAT SUBQ DAY: CPT

## 2021-05-23 PROCEDURE — 85025 COMPLETE CBC W/AUTO DIFF WBC: CPT

## 2021-05-23 PROCEDURE — 700111 HCHG RX REV CODE 636 W/ 250 OVERRIDE (IP): Performed by: INTERNAL MEDICINE

## 2021-05-23 PROCEDURE — 770022 HCHG ROOM/CARE - ICU (200)

## 2021-05-23 PROCEDURE — 83735 ASSAY OF MAGNESIUM: CPT

## 2021-05-23 PROCEDURE — 700101 HCHG RX REV CODE 250: Performed by: INTERNAL MEDICINE

## 2021-05-23 PROCEDURE — 700105 HCHG RX REV CODE 258: Performed by: INTERNAL MEDICINE

## 2021-05-23 PROCEDURE — 94640 AIRWAY INHALATION TREATMENT: CPT

## 2021-05-23 PROCEDURE — 80048 BASIC METABOLIC PNL TOTAL CA: CPT

## 2021-05-23 PROCEDURE — 99291 CRITICAL CARE FIRST HOUR: CPT | Performed by: INTERNAL MEDICINE

## 2021-05-23 RX ORDER — POTASSIUM CHLORIDE 20 MEQ/1
40 TABLET, EXTENDED RELEASE ORAL ONCE
Status: COMPLETED | OUTPATIENT
Start: 2021-05-23 | End: 2021-05-23

## 2021-05-23 RX ORDER — FUROSEMIDE 10 MG/ML
20 INJECTION INTRAMUSCULAR; INTRAVENOUS ONCE
Status: COMPLETED | OUTPATIENT
Start: 2021-05-23 | End: 2021-05-23

## 2021-05-23 RX ORDER — MAGNESIUM SULFATE HEPTAHYDRATE 40 MG/ML
2 INJECTION, SOLUTION INTRAVENOUS ONCE
Status: COMPLETED | OUTPATIENT
Start: 2021-05-23 | End: 2021-05-23

## 2021-05-23 RX ADMIN — LEVETIRACETAM 500 MG: 500 TABLET ORAL at 17:34

## 2021-05-23 RX ADMIN — GABAPENTIN 300 MG: 300 CAPSULE ORAL at 05:33

## 2021-05-23 RX ADMIN — POTASSIUM CHLORIDE 40 MEQ: 1500 TABLET, EXTENDED RELEASE ORAL at 07:49

## 2021-05-23 RX ADMIN — FAMOTIDINE 20 MG: 20 TABLET ORAL at 05:33

## 2021-05-23 RX ADMIN — MEROPENEM 1 G: 1 INJECTION, POWDER, FOR SOLUTION INTRAVENOUS at 21:57

## 2021-05-23 RX ADMIN — MIDODRINE HYDROCHLORIDE 5 MG: 5 TABLET ORAL at 05:32

## 2021-05-23 RX ADMIN — MIDODRINE HYDROCHLORIDE 5 MG: 5 TABLET ORAL at 13:59

## 2021-05-23 RX ADMIN — RISPERIDONE 2 MG: 1 TABLET, FILM COATED ORAL at 05:32

## 2021-05-23 RX ADMIN — HYDROMORPHONE HYDROCHLORIDE 2 MG: 2 INJECTION, SOLUTION INTRAMUSCULAR; INTRAVENOUS; SUBCUTANEOUS at 22:07

## 2021-05-23 RX ADMIN — ACETAMINOPHEN 500 MG: 325 TABLET, FILM COATED ORAL at 21:57

## 2021-05-23 RX ADMIN — LEVETIRACETAM 500 MG: 500 TABLET ORAL at 05:32

## 2021-05-23 RX ADMIN — GABAPENTIN 300 MG: 300 CAPSULE ORAL at 13:59

## 2021-05-23 RX ADMIN — PAROXETINE HYDROCHLORIDE 10 MG: 20 TABLET, FILM COATED ORAL at 05:33

## 2021-05-23 RX ADMIN — MIDODRINE HYDROCHLORIDE 5 MG: 5 TABLET ORAL at 21:57

## 2021-05-23 RX ADMIN — DULOXETINE HYDROCHLORIDE 60 MG: 60 CAPSULE, DELAYED RELEASE ORAL at 17:33

## 2021-05-23 RX ADMIN — GABAPENTIN 300 MG: 300 CAPSULE ORAL at 21:57

## 2021-05-23 RX ADMIN — DEXMEDETOMIDINE 0.4 MCG/KG/HR: 200 INJECTION, SOLUTION INTRAVENOUS at 04:34

## 2021-05-23 RX ADMIN — RISPERIDONE 2 MG: 1 TABLET, FILM COATED ORAL at 17:33

## 2021-05-23 RX ADMIN — DOCUSATE SODIUM 50 MG AND SENNOSIDES 8.6 MG 2 TABLET: 8.6; 5 TABLET, FILM COATED ORAL at 05:32

## 2021-05-23 RX ADMIN — ACETAMINOPHEN 500 MG: 325 TABLET, FILM COATED ORAL at 13:59

## 2021-05-23 RX ADMIN — ACETAMINOPHEN 500 MG: 325 TABLET, FILM COATED ORAL at 08:00

## 2021-05-23 RX ADMIN — HYDROMORPHONE HYDROCHLORIDE 2 MG: 2 INJECTION, SOLUTION INTRAMUSCULAR; INTRAVENOUS; SUBCUTANEOUS at 05:33

## 2021-05-23 RX ADMIN — MEROPENEM 1 G: 1 INJECTION, POWDER, FOR SOLUTION INTRAVENOUS at 05:34

## 2021-05-23 RX ADMIN — FAMOTIDINE 20 MG: 20 TABLET ORAL at 17:33

## 2021-05-23 RX ADMIN — HYDROMORPHONE HYDROCHLORIDE 2 MG: 2 INJECTION, SOLUTION INTRAMUSCULAR; INTRAVENOUS; SUBCUTANEOUS at 09:35

## 2021-05-23 RX ADMIN — HYDROMORPHONE HYDROCHLORIDE 2 MG: 2 INJECTION, SOLUTION INTRAMUSCULAR; INTRAVENOUS; SUBCUTANEOUS at 14:00

## 2021-05-23 RX ADMIN — FUROSEMIDE 20 MG: 10 INJECTION, SOLUTION INTRAMUSCULAR; INTRAVENOUS at 08:03

## 2021-05-23 RX ADMIN — HYDROMORPHONE HYDROCHLORIDE 2 MG: 2 INJECTION, SOLUTION INTRAMUSCULAR; INTRAVENOUS; SUBCUTANEOUS at 01:28

## 2021-05-23 RX ADMIN — MEROPENEM 1 G: 1 INJECTION, POWDER, FOR SOLUTION INTRAVENOUS at 13:59

## 2021-05-23 RX ADMIN — METHADONE HYDROCHLORIDE 30 MG: 10 TABLET ORAL at 05:32

## 2021-05-23 RX ADMIN — MAGNESIUM SULFATE 2 G: 2 INJECTION INTRAVENOUS at 07:49

## 2021-05-23 RX ADMIN — ENOXAPARIN SODIUM 40 MG: 40 INJECTION SUBCUTANEOUS at 05:33

## 2021-05-23 RX ADMIN — HYDROMORPHONE HYDROCHLORIDE 2 MG: 2 INJECTION, SOLUTION INTRAMUSCULAR; INTRAVENOUS; SUBCUTANEOUS at 18:00

## 2021-05-23 ASSESSMENT — PAIN DESCRIPTION - PAIN TYPE
TYPE: ACUTE PAIN;SURGICAL PAIN

## 2021-05-23 ASSESSMENT — FIBROSIS 4 INDEX: FIB4 SCORE: 0.43

## 2021-05-23 NOTE — PROGRESS NOTES
"Critical Care Progress Note    Date of admission  4/16/2021    Chief Complaint  \"50 y.o. female the past medical history of pseudotumor cerebri status post  shunt by Dr. Oh, chronic pain with history of placement of nerve stimulator, vasculitis, right anterior chest port for home IV meds with recurrent infection who presented 4/11/2021 with to [facility] with recurrent port infection. Found to have MSSA bacteremia, endocarditis, septic pulmonary emboli/empyema/pneumatocele, and CSF pleocytosis concerning for  shunt involvement.  Seen by Dr. Oh, NSGY, at [facility] who did not recommend  shunt removal.  Seen by Dr. Vargas here for concern of fluid collection around her spinal stimulator and he recommended against removal. Remains intubated, encephalopathic.\"  5/17:Critical care signing back on tonight after total right lung decortication for organized empyema  She was recovered in PACU, and is now on T-piece.  Follow-up ABG showed mild respiratory acidosis  She has 2 right chest tubes with marked improvement in aeration of right lung postoperatively, no pneumothorax on CXR  Minimize sedation, dexmedetomidine tonight if needed, pulmonary hygiene, follow-up arterial blood gas, positive airway pressure if needed     Hospital Course  4/16 admitted to ICU  4/17 VD 2, 2 FFP, pRBC overnight; new chest tube per gen surg  4/18 VD 3, TPa/Dornase with 1400 cc from R chest tube  4/26 R chest tube not functioning, d/c it.  broke his leg and going to OR today, so not available currently.  VD 11.  8/30%. RSBI immediately high on SBT attempt. Followed commands for RN  4/27 VD12. 8/30%. Not tolerating wean, high RSBI and tachycardic.   4/28 VD 13. VATS with Dr. Ganser.    4/29 VD 14. 8/30%. Still not tolerating wean d/t severe tachycardia just with SAT. Trialed precedex and she required fentanyl up to 600/hr to tolerate, so back on propofol. Chest tubes -40  4/30 VD 15. 8/30%. Try ketamine/versed as sedative. Severe " tachycardia and HTN with weaning down propofol. Plan for perc trach tomorrow. Chest tubes -40  5/1 perc trach. PICC placed. Chest tubes remain -40.  5/2 Able to spont well on sedation, but very tachy/HTN when sedation is turned down.  5/3 midazolam weaned down to 3mg/min  5/4 off versed and ketamine gtt  5/5 trials of SBT w/ trach  5/6 T piece trial during day, mobilize to chair x 2, rest vent 8/8 at night, started methadone, gabapentin   5/7 T piece during day with, rest PS 8/8 overnight, spacing out dilaudid PRNs  5/8 T-piece during day, still with anxiety weaning dex gtt  5/10 CT chest  5/11 will require thoracotomy and decortication  5/12 off ventilator x 24 hours  5/13 replete magnesium, remains on T-piece, ambulating, transfer to med/surg  5/17 -right thoracotomy with total lung decortication, remains on T-piece postoperatively did well  5/18 on T piece but with increase tachycardia 2l IV fluids, increase pain regime, lethargic just asking for dilaudid  5/19 am shock, bolused fluid and gave blood, place on vent and checked multiple labs, stress dose steroids, inc antibiotic coverage re-cultured, ill appearing, was on pressor and febrile  5/20 on vent to keep right lung open, 1 prbc, low dose pressor occaisional  5/21 on vent sponting well, 1 prbc given, off pressor/started midodrine, klebsiella on meropenem, CTx2 with minimal output, wound vac  5/22 On vent, s/p bronch with no significant findings, low dose dex gtt    Overnight events/rouding:  Neuro: Intact moves all follows commands  Neuro: awake following, dilaudid 2mg Q4  HR:   SBP:   Tmax: afebrile  GI: impact at goal, BM 5/22  UOP: [place] goal  Lines: CT no output, picc line  Resp: t piecing 10l/40%, some secretions tan yellow   Vte: lovenox  PPI/H2:pepcid  Antibx: meropenem    Lasix 20mg IV  Mobilize, t piece trials  Stop dex gtt  Mag and k replaced    Review of Systems  Review of Systems   Unable to perform ROS: Intubated        Vital  Signs for last 24 hours   Temp:  [36.1 °C (96.9 °F)-36.4 °C (97.6 °F)] 36.4 °C (97.6 °F)  Pulse:  [] 92  Resp:  [12-25] 16  BP: ()/(41-85) 111/55  SpO2:  [96 %-100 %] 98 %    Hemodynamic parameters for last 24 hours       Respiratory Information for the last 24 hours  Vent Mode: Spont  PEEP/CPAP: 8  P Support: 5  MAP: 9.9  Length of Weaning Trial (Hours): 7 hr 29 min  Control VTE (exp VT): 294    Physical Exam   Physical Exam  Vitals and nursing note reviewed.   Constitutional:       General: She is not in acute distress.     Appearance: She is ill-appearing. She is not toxic-appearing or diaphoretic.      Comments: Laying in bed chronic ill appearing improved color   HENT:      Head: Normocephalic and atraumatic.      Comments: Purple hair     Nose: Nose normal.      Comments: cortrac     Mouth/Throat:      Mouth: Mucous membranes are moist.      Pharynx: Oropharynx is clear.   Eyes:      Pupils: Pupils are equal, round, and reactive to light.   Neck:      Comments: Tracheostomy in place on vent  Cardiovascular:      Rate and Rhythm: Regular rhythm. Tachycardia present.      Pulses: Normal pulses.      Heart sounds: Normal heart sounds.   Pulmonary:      Comments: T piece, CT x2 right chest with wound vac in place no redness around CT or vac  occ ronchi  Abdominal:      General: There is no distension.      Palpations: Abdomen is soft. There is no mass.      Tenderness: There is no abdominal tenderness. There is no guarding or rebound.      Hernia: No hernia is present.   Musculoskeletal:         General: No swelling or tenderness. Normal range of motion.      Cervical back: Normal range of motion and neck supple.   Skin:     General: Skin is warm and dry.      Capillary Refill: Capillary refill takes less than 2 seconds.      Coloration: Skin is pale. Skin is not jaundiced.      Findings: No bruising or erythema.   Neurological:      General: No focal deficit present.      Comments: Awake improved,  follows commands, moves all extremities, mouths words, writing on board         Medications  Current Facility-Administered Medications   Medication Dose Route Frequency Provider Last Rate Last Admin   • HYDROmorphone (DILAUDID) injection 2 mg  2 mg Intravenous Q4HRS PRN Juventino Lozano M.D.   2 mg at 05/23/21 0935   • famotidine (PEPCID) tablet 20 mg  20 mg Enteral Tube BID Juventino Lozano M.D.   20 mg at 05/23/21 0533   • enoxaparin (LOVENOX) inj 40 mg  40 mg Subcutaneous DAILY Juventino Lozano M.D.   40 mg at 05/23/21 0533   • midodrine (PROAMATINE) tablet 5 mg  5 mg Enteral Tube Q8HRS Juventino Lozano M.D.   5 mg at 05/23/21 0532   • phenylephrine (VEDA-SYNEPHRINE) 40 mg in  mL Infusion  0-300 mcg/min Intravenous Continuous Juventino Lozano M.D.   Stopped at 05/22/21 1715   • meropenem (MERREM) 1 g in  mL IVPB  1,000 mg Intravenous Q8HRS Dixie Albarran M.D.   Stopped at 05/23/21 0604   • Respiratory Therapy Consult   Nebulization Continuous RT Juventino Lozano M.D.       • MD Alert...ICU Electrolyte Replacement per Pharmacy   Other PHARMACY TO DOSE Juventino Lozano M.D.       • lidocaine (XYLOCAINE) 1 % injection 1-2 mL  1-2 mL Tracheal Tube Q30 MIN PRN Juventino Lozano M.D.       • levETIRAcetam (KEPPRA) tablet 500 mg  500 mg Enteral Tube BID Juventino Lozano M.D.   500 mg at 05/23/21 0532   • gabapentin (NEURONTIN) capsule 300 mg  300 mg Enteral Tube Q8HRS Juventino Lozano M.D.   300 mg at 05/23/21 0533   • methadone (DOLOPHINE) tablet 30 mg  30 mg Enteral Tube DAILY Juventino Lozano M.D.   30 mg at 05/23/21 0532   • diazePAM (VALIUM) tablet 5 mg  5 mg Enteral Tube QHS PRN Raphael Armstrong M.D.   5 mg at 05/19/21 2136   • ondansetron (ZOFRAN) syringe/vial injection 4 mg  4 mg Intravenous Q4HRS PRN Juventino Lozano M.D.   4 mg at 05/13/21 0431   • acetaminophen (TYLENOL) tablet 500 mg  500 mg Enteral Tube TID Juventino Lozano M.D.   500 mg at 05/23/21 0800   • oxyCODONE  immediate-release (ROXICODONE) tablet 5 mg  5 mg Enteral Tube Q4HRS PRN Juventino Lozano M.D.   5 mg at 05/22/21 1728   • labetalol (NORMODYNE/TRANDATE) injection 10 mg  10 mg Intravenous Q4HRS PRN Juventino Lozano M.D.   10 mg at 05/04/21 0226   • Pharmacy Consult: Enteral tube insertion - review meds/change route/product selection  1 Each Other PHARMACY TO DOSE Juventino Lozano M.D.       • acetaminophen (Tylenol) tablet 650 mg  650 mg Enteral Tube Q4HRS PRN Juventino Lozano M.D.   650 mg at 05/12/21 1233   • magnesium hydroxide (MILK OF MAGNESIA) suspension 30 mL  30 mL Enteral Tube QDAY PRN Juventino Lozano M.D.        And   • senna-docusate (PERICOLACE or SENOKOT S) 8.6-50 MG per tablet 2 tablet  2 tablet Enteral Tube BID Juventino Lozano M.D.   2 tablet at 05/23/21 0532    And   • polyethylene glycol/lytes (MIRALAX) PACKET 1 Packet  1 Packet Enteral Tube QDAY PRN Juventino Lozano M.D.   1 Packet at 05/20/21 0505    And   • bisacodyl (DULCOLAX) suppository 10 mg  10 mg Rectal QDAY PRN Juventino Lozano M.D.       • DULoxetine (CYMBALTA) capsule 60 mg  60 mg Enteral Tube BID Elliott Salvador M.D.   60 mg at 05/22/21 1728   • PARoxetine (PAXIL) tablet 10 mg  10 mg Enteral Tube QAM Elliott Salvador M.D.   10 mg at 05/23/21 0533   • risperiDONE (RISPERDAL) tablet 2 mg  2 mg Enteral Tube BID Elliott Salvador M.D.   2 mg at 05/23/21 0532   • ipratropium-albuterol (DUONEB) nebulizer solution  3 mL Nebulization Q2HRS PRN (RT) Juventino Lozano M.D.           Fluids    Intake/Output Summary (Last 24 hours) at 5/23/2021 1015  Last data filed at 5/23/2021 0800  Gross per 24 hour   Intake 1568.18 ml   Output 2062 ml   Net -493.82 ml       Laboratory  Recent Labs     05/21/21  0327   ISTATAPH 7.461   ISTATAPCO2 38.2*   ISTATAPO2 81   ISTATATCO2 28   IDWBKNA5HUB 97   ISTATARTHCO3 27.3*   ISTATARTBE 3   ISTATTEMP 37.0 C   ISTATFIO2 30   ISTATSPEC Arterial   ISTATAPHTC 7.461   [id number] 81         Recent Labs      05/21/21 0520 05/22/21 0344 05/23/21  0425   SODIUM 136 136 136   POTASSIUM 3.9 4.2 3.7   CHLORIDE 105 102 104   CO2 26 29 26   BUN 27* 19 13   CREATININE 0.19* 0.18* <0.17*   MAGNESIUM 1.6 1.7 1.7   PHOSPHORUS 3.0 3.0  --    CALCIUM 8.4* 8.0* 8.0*     Recent Labs     05/21/21 0520 05/22/21 0344 05/23/21  0425   GLUCOSE 105* 94 89     Recent Labs     05/21/21 0520 05/22/21 0344 05/23/21  0425   WBC 9.8 7.2 6.8   NEUTSPOLYS 77.30* 71.80 70.40   LYMPHOCYTES 14.90* 17.50* 18.90*   MONOCYTES 6.50 8.90 8.90   EOSINOPHILS 0.20 0.30 0.10   BASOPHILS 0.30 0.40 0.40     Recent Labs     05/21/21 0520 05/22/21 0344 05/23/21  0425   RBC 3.13* 2.96* 2.86*   HEMOGLOBIN 8.9* 8.3* 8.2*   HEMATOCRIT 28.1* 27.2* 26.5*   PLATELETCT 326 348 377       Imaging  X-Ray:  I have personally reviewed the images and compared with prior images.    Assessment/Plan  * Empyema of right pleural space (HCC)- (present on admission)  Assessment & Plan  R empyema due to septic pulmonary emboli  4/16 Chest tube placement at [facility]  4/18 Upsized by Dr Benedict  4/18-4/20 Received TPA/dornase.  Stopped b/c Hgb dropped requiring transfusion  4/26 was discontinued due to stopped functioning   4/28 s/p right VATS and decortication. Cx grew of Klebsiella pneumonia  5/17 total right lung decortication for organized empyema, right chest tubes x2    Bacteremia due to methicillin susceptible Staphylococcus aureus (MSSA)- (present on admission)  Assessment & Plan  Source presumed right anterior chest line/port with endocarditis  Multiple sources for MSSA including MV/TV vegetation, port, spinal stimulator/ shunt  Status post port removal at [facility]  Last positive blood cultures were 4/17  Status post treatment with nafcillin from 4/16-4/23  ID following, continue meropenem 7days then back to cefepimine    Acute bacterial endocarditis- (present on admission)  Assessment & Plan  MSSA bacteremia at OSH. First negative blood cultures were on 4/17  Infected port  removed at [facility]   shunt and spinal stimulator could be seeded, NSGY has consulted on both and don't recommend removal at admission due to HD instability.   MV and TV vegetations with septic pulmonary emboli  Nataly ID following, antibiotics escalated to cefepime for VAP and klebsiella on prior pleural fluid  Follow-up cultures from decortication 5/17 -> none taken    Acute respiratory failure with hypoxia (HCC)- (present on admission)  Assessment & Plan  4/15 Intubated for acute hypoxic respiratory failure at [facility] due to hydro/pneumo, trapped lung, lung abscesses. 5/1 s/p percutaneous tracheostomy  Place back on ventilator for peep and recruitment and rest with her acute shock 5/19 gas exchange good  Monitor timing of attempt t-piece trials  5/21 SBT trials monitor for de-recruitment on CXR  Bronchoscopy 5/22 was benign  T piece trials 5/23      Sepsis (HCC)  Assessment & Plan        Acute encephalopathy- (present on admission)  Assessment & Plan  Reorient and maintain sleep/wake cycle, mobilize  Limit sedative when able  Difficult to treat anxiety and acute/chronic pain    Pleural effusion, left  Assessment & Plan  Parapneumonic fluid from abscesses  CT guided chest tube placed 4/23  TPA/dornase one dose on 4/24.  Hold d/t dropping Hgb  CT chest 4/25 with resolution of fluid  CT clamped 4/29-no significant accumulation of fluid so d/c 4/30  Monitor for now radiographically    Pneumonia  Assessment & Plan  MSSA septic emboli with empyema   BAL 4/20 klebsiella pneumonia  BAL 4/24 still heavy growth of klebsiella  4/28 OR pleural fluid specimen growing klebsiella.   Continue cefepime per ID -> meropenem for 7 days and then back to cefepimine    Tachycardia  Assessment & Plan  Persistent, sinus tach  Treat underlying causes    S/P  shunt- (present on admission)  Assessment & Plan  History of pseudotumor cerebri  History of  shunt by Dr. Oh  Could be seeded by MSSA, Dr. Oh consulted at [facility], recommended MRI  but unable given her spinal stimulator    Shock (HCC)  Assessment & Plan  Undifferentiated acute am of 5/19   Fluid bolus, PRBC blood cultures, sputum, ID follow with broadening antibiotics  Lactate normal  Hg 6.5 will serial monitor hg, No coagulopathy  emperic rx with stress dose steroids and cortisol pending  Passive leg raise unremarkable  Worsening infiltrates and sputum on right lung place on ventilator -> benign on bronchoscopy for pna    Debility  Assessment & Plan  PT/OT/SLP eval    Elevated alkaline phosphatase level- (present on admission)  Assessment & Plan  Unchanged --> recheck tomorrow  With dilated biliary ducts on imaging, stable  Unable to get MRCP due to nerve stimulator, ?  Eventual ERCP    Goals of care, counseling/discussion  Assessment & Plan  Palliative consulted  Full code    Spinal cord stimulator status  Assessment & Plan  Patient's stimulator is [employer]ve[employer]. It is FDA approved as MRI compatible, but the company has gone out of business, so there is no support team to assist with MRI.  Thus, if MRI were performed, our radiologists would need to protocol it correctly to manage the stimulator. The former rep from [employer] is Andre, [phone].  Information obtained from Dr. Mahoney's office, [phone].    Per  (5/4/2021), it is not MRI compatible unfortunately.     Anasarca  Assessment & Plan  Secondary to chronic critical illness  Optimize nutrition and volume status    Anemia  Assessment & Plan  Daily CBC with conservative transfusion strategy  5/19 transfusion 1 prbc, 5/20 1 prbc, 5/21 1 prbc  Continue to monitor for bleeding in right lung CT's are minimal  Restart vte prophy monitor closely for bleeding 5/23    Prolonged Q-T interval on ECG- (present on admission)  Assessment & Plan  Optimize electrolytes, continuous telemetry monitoring  Avoid QT prolonging medications as able    Chronic pain syndrome- (present on admission)  Assessment & Plan  Chronic back pain and intractable  headaches  History of nerve stimulator   Dr Vargas consulted at admission, no plan to remove stimulator due to her clinical instability  Multimodal with scheduled oxycodone, gabapentin, methadone (increased dose 5/11)    Hypomagnesemia  Assessment & Plan  Magnesium 1.8  Replete to keep > 2.0    Hypokalemia  Assessment & Plan  Keep > 4.0    Anxiety- (present on admission)  Assessment & Plan  Improving  Continue Depakote, paxil, cymbalta, gabapentin  Limited as needed benzodiazepines    I have performed a physical exam and reviewed and updated ROS and Plan today (5/23/2021). In review of yesterday's note (5/22/2021), there are no changes except as documented above.     Discussed patient condition and risk of morbidity and/or mortality with RN, RT, Pharmacy, Charge nurse / hot rounds and Patient      Patient remains in critical condition from needing ventilator support with titration and dex gtt with titration. Critical care time provided was 40 minutes. This excludes all separate billable procedures.

## 2021-05-23 NOTE — PROGRESS NOTES
Infectious Disease Daily Progress Note    Date of Service  5/23/2021    Chief Complaint  Meropenem 5/19-present     Previous ABX  Cefepime 4/23 5/18-  s/p 23 days  Cefazolin 5/18-5/19     Thoracoscopy & Decortication - 4/28  Decortication 5/17     PRIOR Rx:   Zosyn 4/22-4/23  Previous: Unasyn, Zosyn, Vanco, Daptomycin  Ceftaroline: start 4/13-4/15  Nafcillin 2g Q4 hrs 4/15-4/23 4/14: Unable to give name of previous NSG or neurologist. Seems confused, but able to say some sentences fluently.   4/15: Line cultures now growing MSSA. As well as from the blood. Repeat blood culture 4/13+ in both sets. Patient has worsening confusion. CSF fluid analyses suggest pleocytosis. Cultures are no growth from the CSF. Chest CT was ordered this morning indicating a loculated right pleural effusion as well as bilateral septic emboli. Dr. Mack spoke with Dr. Oh yesterday and no surgical intervention unless clinical deterioration.  4/16: Increased respiratory distress.  Tachypneic.  CT with loculated collection.  Dr Resendiz not available.  No thoracic surgeon available.  Plans to have pulmonary place CT.  Transferring to ICU.  4/17: Transferred to [facility] last night. Hgb dropped to 6.4 requiring PRBC transfusion. Requiring 2 pressors. Fio2 50%. Febrile 38.8. Pending OR decortication of empyema and hemothorax. Chest tube placed at [facility] shows 8,371 WBC, ,000, 74% N  4/18: Chest tube was upsized by surgery yesterday, no decortication. WBC 22k. Fio2 40%. Vasopressin. Levophed down to 2mcg. Afebrile 24 hrs. Last fever 38.6 on 4/16.   4/19: Still on vent. Levo 3 mcg, vasopressin. Afebrile 72 hours. WBC 21k. BC 4/17 NGTD x 48 hours. Unable to do MRI brain given neurostimulator per RN.   4/20: Remaining afebrile. Hb 6.2 and undergoing transfusion today.WBC 24,100, 5% bands.1700 ml CT output. Copious bloody ET secretions. No pressors. Receiving dornase and TPA per CT. Right pleural effusion not large per CXR today.   4/21: WBC  31k. Bronchoscopy yesterday showing blood. Cultures sent. TPA on hold per RN due to arvind blood from chest tube requiring PRBC transfusion for hgb 6. Pending chest CT today. Per , spinal stimulator is non-MRI compatible. Levophed 10mcg. 30% Fio2. Afebrile.   4/22: Fever 101.3 this am. WBC 20,300 down from 32,200 yesterday. BAL growing Klebsiella pneumoniae but susceptibility panel not set up until today. CTA of brain shows no lesion. CT of chest shows enlarging cavitary lung lesions bilat and large loculated new left pleural effusion and large hydropneumothorax on right. TPA & dornase infusions of right chest ended 4/20 after considerable bleeding requiring transfusion. Hb now down again to 6.8.  4/23: WBC 23k. Fio2 40%. Tmax 38.1. US of stiumlator shows small fluid collection but no drainable abscess. Pending MICAH today. Pending L chest tube placement  4/24: Continue fever 100.8-101.8. WBC 16,600. MICAH shows large vegetations on both tricuspid valve and mitral valve with mild TR & MR.  No aortic root abscess. Left chest tube placed yesterday yielding 8 ml of old bloody fluid. Bronch today revealed copious thick maroon secretions in distal trachea and both mainstem bronchi. On the right these were obstructive. Remaining off pressors. Last positive blood cultures (MSSA) were 4/16, negative 4/17.  4/25: Fever 100.6 this AM. wBC 13,300. Hb 6.6. CT: right hydropneumothorax increasing, right bronchopleural fistula, mediastinal shift ot the left , multiple cavitary pulmonary nodules, 3.1 cm left basilar mass, splenomegaly. CT of head shows dural thickening over the clivus. Lac+ GNR >100,000 col/ml growing from BAL of 4/24, presumed Klebsiella. Left peural fluid culture negative from 4/23.  4/26: Fever 100.4 last night. WBC 13,500. Hb 7.4. Plts 502,000. ESR >120. UA fairly clear except 30 mg% protein, 2-5 wbc and rare rbc. CXR unchanged except more prominent pulmonary edema. GNR in BAL may be a different species of  Klebsiella, and resistant to ampicillin & tmp-sulfa; confirmation pending.  4/28: Fever 100.8 last night. WBC 27,700. Hb 6.6. Plts 482,000. Creat 0.19. Kleb pneum in BAL on 4/24 is resistant to ampicillin & tmp-sulfa but otherwise sensitive. O2 sat 96% on FIO2 30% now, PEEP 8. Has been re-evaluated by thoracic surgeon, Dr. Ganser, who believes she will not wean without decortication on the right. Surgery anticipated today.  4/29: S/P right thoracoscopy with decortication with cultures NG at 24 hrs.  4/30: Had a bronch due to hypoxia and hypotension. CXR with worsening right hemithorax pulmonary opacities. Bloody mucous plugs removed. Pressors started. Febrile this am. Tachy in 130s. Pressors just removed. Tolerating vent, but not a SBT candidate at the moment.  5/1: Small air leak CT-2 every ~ 2/3 breathes. The K. pneumoniae from her thoracoscopy is sensitive to her cefepime so no antibiotic change needed.   5/2: No air leak today she tolerated 2  hrs of spontaneous breathing with support today.      5/3: Second day with SBT and doing well now for 2 hrs. Slight bump in temperature and      WBC's but no obvious clinical infectious changed so watch closely.      5/4: Fever still from time to time. WBCs trending up. Chest tubes in place. Trach.       5/5: She clearly is better today she was sitting up in bed with open eyes and follows commands. She still has low grade fever but her WBC's are dropping. Cefepime dose increased yesterday for increased CNS coverage if necessary. She will probably need further thoracic surgery as mentioned by Dr. Ganser. The issue of what to do with her stimulator is still up in the air for now as it probably will need to be removed but she is nort a candidate for more major surgery at this time.       5/7: She continues to improve and under go longer SBT trials. She just had a new PICC placed in her right arm and plan is to D/C central line.        5/8: PICC placed. No fevers. Comfortable.  Cortrak placed. Failing SBTs.  5/9: No acute issues. No fevers. Comfortable. Family at bedside.   5/10: No acute issues, fevers or WBC bumps. No changes in condition. She is to get her CT scan today and be reviewed by surgery  5/11:  at bedside.  Has been referred to LANE.  Repeat CT scan completed.  Results noted.  ? If Dr Ganser has seen.  Still with an empyema:  ? If candidate for further surgery.  5/12: Pending next thoracic surgery. No fever. Anxious.  5/13: ? Surgery date.  No one seems to know.  Had speaking valve in and having swallow eval with speech therapy  5/14: No acute issues. No fevers. Surgery Monday.  5/15: One CT accidentally pulled out yesterday.  Has been on T piece and tolerating. Plans for surgery Monday afternoon.  5/16: On schedule for surgery 5/17: 4:30 pm.  Moved to room T614.  No new issues.  5/17: Was sitting up in the bedside chair since change of shift.  Now walking in hallways with RN and CNA.  5/18: Decortication yesterday with 2 chest tube placement. WBC improving 21->14k  5/19: Hypotension and tachycardia.  Decreased H/H.  Placed back on vent to rest.  Dr Lozano in process of placing new line.  Antibiotics were deesculated yesterday to Cefazolin.  No cx were taken at surgery.  5/20: Pt was febrile yest afternoon 38.2, but now afebrile overnight after meropenem. WBC improved 11k->8k. Fio2 30%. Phenylephrine now off this morning. CXR shows new R consolidations.   5/21: Off pressors.  On vent: FiO2 30%, PEEP 8, PS 5 Received pRBCs yesterday.  5/22: Remains afebrile, off pressors. FiO2 30%.   5/23: Afebrile. FiO2 30%. Bronchoscopy yesterday normal.     Review of Systems  Review of Systems   Unable to perform ROS: Acuity of condition        Physical Exam  Temp:  [36.1 °C (96.9 °F)-36.4 °C (97.6 °F)] 36.4 °C (97.6 °F)  Pulse:  [] 108  Resp:  [12-25] 15  BP: ()/(46-85) 137/65  SpO2:  [96 %-100 %] 97 %    Physical Exam  Constitutional:       Comments: awake non  verbal  HENT:      Head:      Comments: NG tube  Cardiovascular:      Rate and Rhythm: Normal rate and regular rhythm.      Heart sounds: No murmur heard.     Pulmonary:      Comments: lungs clear to auscultation. 2 chest tubes with serosanguinous output.   Abdominal:      General: Abdomen is flat. Bowel sounds are normal. There is no distension.   Neurological:      Comments: awake   Fluids    Intake/Output Summary (Last 24 hours) at 5/23/2021 0726  Last data filed at 5/23/2021 0604  Gross per 24 hour   Intake 1683.61 ml   Output 1962 ml   Net -278.39 ml       Laboratory  Recent Labs     05/21/21  0520 05/22/21  0344 05/23/21  0425   WBC 9.8 7.2 6.8   RBC 3.13* 2.96* 2.86*   HEMOGLOBIN 8.9* 8.3* 8.2*   HEMATOCRIT 28.1* 27.2* 26.5*   MCV 89.8 91.9 92.7   MCH 28.4 28.0 28.7   MCHC 31.7* 30.5* 30.9*   RDW 62.1* 61.8* 58.7*   PLATELETCT 326 348 377   MPV 9.9 9.5 9.4     Recent Labs     05/21/21  0520 05/22/21  0344 05/23/21  0425   SODIUM 136 136 136   POTASSIUM 3.9 4.2 3.7   CHLORIDE 105 102 104   CO2 26 29 26   GLUCOSE 105* 94 89   BUN 27* 19 13   CREATININE 0.19* 0.18* <0.17*   CALCIUM 8.4* 8.0* 8.0*             Recent Labs     05/22/21  1515   TRIGLYCERIDE 93       Impression   -Severe sepsis  -Catheter related bloodstream infection due to infected port status post removal 4/12-staph aureus  -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       - Acute left empyema s/p chest tube placement 4/23, decortication 5/17 - Klebsiella  -Multiple acute septic pulmonary emboli bilaterally  -Acute bilateral pneumonia due to above     --Pulmonary edema  -Pseudotumor cerebri with underlying  shunt: possible arachnoiditis  -Chronic migraine headaches  -History of autoimmune vasculitis  -Elevated alkaline phosphatase & bilirubin  -Acute encephalopathy due to sepsis      - anemia due to above          - acute fever  on 5/19 likely from secondary VAP          - Secondary VAP R sided pneumonia          - decubitus ulcer sacral.      Plan   No surgical cultures were taken on last decortication. Tracheal aspirate recently 5/19 no growth, blood cultures 5/19 no growth so far. CXR shows worsening R sided infiltrates concerning for secondary infections after reviewing her CXR's between 5/18 and 5/19.  Repeated sputum cultures still exhibiting her original Klebsiella empyema infection. She improved post operatively with defervescence and pressor weaning while on meropenem. Suggestive of either time, or an anaerobic infection that we werent covering. Continue meropenem for 7 days, then switch back to cefazolin or unasyn afterwards      - cont meropenem for now and broadened coverage for secondary VAP for 7 days. Target date 5/26.  - Afterwards, revert back to either cefazolin or unasyn for her Kleb empyema and MSSA endocarditis treatment 4 weeks after decortication. Target date 6/14.   - Will need repeat Chest CT right before completion of antibiotics  - after completion of IV abx  Will need suppression therapy afterward for her HW (spinal stimulator,  shunt  targeting MSSA  With keflex).  Will continue Meropenem for now.  Make certain she continues to improve before considering attempt to deesculate again.  - vent management per pulmonary  - monitor chest tube output  - no treatment changes today     30 min spent in critical care management without overlap coordinating care/counseling.

## 2021-05-23 NOTE — CARE PLAN
Problem: Pain Management  Goal: Pain level will decrease to patient's comfort goal  Outcome: Progressing     Problem: Knowledge Deficit - Standard  Goal: Patient and family/care givers will demonstrate understanding of plan of care, disease process/condition, diagnostic tests and medications  Outcome: Progressing   The patient is informed of procedures, family present for bronchoscopy.     Shift Goals  Clinical Goals: infection control, ween off ventilator  Patient Goals: pain control    Progress made toward(s) clinical / shift goals:     Patient is not progressing towards the following goals:

## 2021-05-23 NOTE — CARE PLAN
Problem: Ventilation  Goal: Ability to achieve and maintain unassisted ventilation or tolerate decreased levels of ventilator support  Description: Document on Vent flowsheet    1.  Support and monitor invasive and noninvasive mechanical ventilation  2.  Monitor ventilator weaning response  3.  Perform ventilator associated pneumonia prevention interventions  4.  Manage ventilation therapy by monitoring diagnostic test results  Outcome: Progressing      Ventilator Daily Summary     Vent Day ? Trach Day 23 (Current Trach placed 5/1/21)     Ventilator settings changed this shift: None     Weaning trials: SPONT as Tolerated; ASV Resting     Respiratory Procedures: None     Plan: Continue current ventilator settings and wean mechanical ventilation as tolerated per physician orders.

## 2021-05-23 NOTE — CARE PLAN
Problem: Pain Management  Goal: Pain level will decrease to patient's comfort goal  Outcome: Progressing     Problem: Knowledge Deficit - Standard  Goal: Patient and family/care givers will demonstrate understanding of plan of care, disease process/condition, diagnostic tests and medications  Outcome: Progressing     Problem: Fall Risk  Goal: Patient will remain free from falls  Outcome: Progressing

## 2021-05-24 LAB
ALBUMIN SERPL BCP-MCNC: 2.7 G/DL (ref 3.2–4.9)
ALBUMIN/GLOB SERPL: 0.7 G/DL
ALP SERPL-CCNC: 602 U/L (ref 30–99)
ALT SERPL-CCNC: 35 U/L (ref 2–50)
ANION GAP SERPL CALC-SCNC: 6 MMOL/L (ref 7–16)
AST SERPL-CCNC: 27 U/L (ref 12–45)
BACTERIA BLD CULT: NORMAL
BACTERIA BLD CULT: NORMAL
BASOPHILS # BLD AUTO: 0.3 % (ref 0–1.8)
BASOPHILS # BLD: 0.02 K/UL (ref 0–0.12)
BILIRUB SERPL-MCNC: 0.4 MG/DL (ref 0.1–1.5)
BUN SERPL-MCNC: 14 MG/DL (ref 8–22)
CALCIUM SERPL-MCNC: 8.7 MG/DL (ref 8.5–10.5)
CHLORIDE SERPL-SCNC: 103 MMOL/L (ref 96–112)
CO2 SERPL-SCNC: 30 MMOL/L (ref 20–33)
CREAT SERPL-MCNC: 0.18 MG/DL (ref 0.5–1.4)
CRP SERPL HS-MCNC: 6.04 MG/DL (ref 0–0.75)
EOSINOPHIL # BLD AUTO: 0.02 K/UL (ref 0–0.51)
EOSINOPHIL NFR BLD: 0.3 % (ref 0–6.9)
ERYTHROCYTE [DISTWIDTH] IN BLOOD BY AUTOMATED COUNT: 59.7 FL (ref 35.9–50)
GLOBULIN SER CALC-MCNC: 4 G/DL (ref 1.9–3.5)
GLUCOSE SERPL-MCNC: 118 MG/DL (ref 65–99)
HCT VFR BLD AUTO: 28.7 % (ref 37–47)
HGB BLD-MCNC: 8.8 G/DL (ref 12–16)
IMM GRANULOCYTES # BLD AUTO: 0.06 K/UL (ref 0–0.11)
IMM GRANULOCYTES NFR BLD AUTO: 0.8 % (ref 0–0.9)
LYMPHOCYTES # BLD AUTO: 1.33 K/UL (ref 1–4.8)
LYMPHOCYTES NFR BLD: 17.8 % (ref 22–41)
MAGNESIUM SERPL-MCNC: 1.8 MG/DL (ref 1.5–2.5)
MCH RBC QN AUTO: 28.8 PG (ref 27–33)
MCHC RBC AUTO-ENTMCNC: 30.7 G/DL (ref 33.6–35)
MCV RBC AUTO: 93.8 FL (ref 81.4–97.8)
MONOCYTES # BLD AUTO: 0.74 K/UL (ref 0–0.85)
MONOCYTES NFR BLD AUTO: 9.9 % (ref 0–13.4)
NEUTROPHILS # BLD AUTO: 5.32 K/UL (ref 2–7.15)
NEUTROPHILS NFR BLD: 70.9 % (ref 44–72)
NRBC # BLD AUTO: 0 K/UL
NRBC BLD-RTO: 0 /100 WBC
PLATELET # BLD AUTO: 410 K/UL (ref 164–446)
PMV BLD AUTO: 9.3 FL (ref 9–12.9)
POTASSIUM SERPL-SCNC: 4 MMOL/L (ref 3.6–5.5)
PREALB SERPL-MCNC: 23.5 MG/DL (ref 18–38)
PROT SERPL-MCNC: 6.7 G/DL (ref 6–8.2)
RBC # BLD AUTO: 3.06 M/UL (ref 4.2–5.4)
SIGNIFICANT IND 70042: NORMAL
SIGNIFICANT IND 70042: NORMAL
SITE SITE: NORMAL
SITE SITE: NORMAL
SODIUM SERPL-SCNC: 139 MMOL/L (ref 135–145)
SOURCE SOURCE: NORMAL
SOURCE SOURCE: NORMAL
WBC # BLD AUTO: 7.5 K/UL (ref 4.8–10.8)

## 2021-05-24 PROCEDURE — 97530 THERAPEUTIC ACTIVITIES: CPT

## 2021-05-24 PROCEDURE — 700102 HCHG RX REV CODE 250 W/ 637 OVERRIDE(OP): Performed by: INTERNAL MEDICINE

## 2021-05-24 PROCEDURE — 99291 CRITICAL CARE FIRST HOUR: CPT | Performed by: INTERNAL MEDICINE

## 2021-05-24 PROCEDURE — 83735 ASSAY OF MAGNESIUM: CPT

## 2021-05-24 PROCEDURE — A9270 NON-COVERED ITEM OR SERVICE: HCPCS | Performed by: INTERNAL MEDICINE

## 2021-05-24 PROCEDURE — 700111 HCHG RX REV CODE 636 W/ 250 OVERRIDE (IP): Performed by: INTERNAL MEDICINE

## 2021-05-24 PROCEDURE — 94640 AIRWAY INHALATION TREATMENT: CPT

## 2021-05-24 PROCEDURE — 85025 COMPLETE CBC W/AUTO DIFF WBC: CPT

## 2021-05-24 PROCEDURE — 770022 HCHG ROOM/CARE - ICU (200)

## 2021-05-24 PROCEDURE — 80053 COMPREHEN METABOLIC PANEL: CPT

## 2021-05-24 PROCEDURE — 97116 GAIT TRAINING THERAPY: CPT

## 2021-05-24 PROCEDURE — 94799 UNLISTED PULMONARY SVC/PX: CPT

## 2021-05-24 PROCEDURE — 86140 C-REACTIVE PROTEIN: CPT

## 2021-05-24 PROCEDURE — 700105 HCHG RX REV CODE 258: Performed by: INTERNAL MEDICINE

## 2021-05-24 PROCEDURE — 84134 ASSAY OF PREALBUMIN: CPT

## 2021-05-24 PROCEDURE — 94003 VENT MGMT INPAT SUBQ DAY: CPT

## 2021-05-24 RX ORDER — MAGNESIUM SULFATE HEPTAHYDRATE 40 MG/ML
2 INJECTION, SOLUTION INTRAVENOUS ONCE
Status: COMPLETED | OUTPATIENT
Start: 2021-05-24 | End: 2021-05-24

## 2021-05-24 RX ADMIN — DULOXETINE HYDROCHLORIDE 60 MG: 60 CAPSULE, DELAYED RELEASE ORAL at 17:33

## 2021-05-24 RX ADMIN — LEVETIRACETAM 500 MG: 500 TABLET ORAL at 17:32

## 2021-05-24 RX ADMIN — RISPERIDONE 2 MG: 1 TABLET, FILM COATED ORAL at 17:32

## 2021-05-24 RX ADMIN — METHADONE HYDROCHLORIDE 30 MG: 10 TABLET ORAL at 05:07

## 2021-05-24 RX ADMIN — ACETAMINOPHEN 500 MG: 325 TABLET, FILM COATED ORAL at 21:10

## 2021-05-24 RX ADMIN — MIDODRINE HYDROCHLORIDE 5 MG: 5 TABLET ORAL at 05:07

## 2021-05-24 RX ADMIN — LEVETIRACETAM 500 MG: 500 TABLET ORAL at 05:08

## 2021-05-24 RX ADMIN — DOCUSATE SODIUM 50 MG AND SENNOSIDES 8.6 MG 2 TABLET: 8.6; 5 TABLET, FILM COATED ORAL at 17:32

## 2021-05-24 RX ADMIN — HYDROMORPHONE HYDROCHLORIDE 2 MG: 2 INJECTION, SOLUTION INTRAMUSCULAR; INTRAVENOUS; SUBCUTANEOUS at 02:17

## 2021-05-24 RX ADMIN — OXYCODONE 5 MG: 5 TABLET ORAL at 17:37

## 2021-05-24 RX ADMIN — HYDROMORPHONE HYDROCHLORIDE 2 MG: 2 INJECTION, SOLUTION INTRAMUSCULAR; INTRAVENOUS; SUBCUTANEOUS at 22:37

## 2021-05-24 RX ADMIN — ACETAMINOPHEN 500 MG: 325 TABLET, FILM COATED ORAL at 14:43

## 2021-05-24 RX ADMIN — DOCUSATE SODIUM 50 MG AND SENNOSIDES 8.6 MG 2 TABLET: 8.6; 5 TABLET, FILM COATED ORAL at 05:07

## 2021-05-24 RX ADMIN — FAMOTIDINE 20 MG: 20 TABLET ORAL at 05:08

## 2021-05-24 RX ADMIN — RISPERIDONE 2 MG: 1 TABLET, FILM COATED ORAL at 05:07

## 2021-05-24 RX ADMIN — MEROPENEM 1 G: 1 INJECTION, POWDER, FOR SOLUTION INTRAVENOUS at 05:07

## 2021-05-24 RX ADMIN — GABAPENTIN 300 MG: 300 CAPSULE ORAL at 21:10

## 2021-05-24 RX ADMIN — GABAPENTIN 300 MG: 300 CAPSULE ORAL at 14:43

## 2021-05-24 RX ADMIN — HYDROMORPHONE HYDROCHLORIDE 2 MG: 2 INJECTION, SOLUTION INTRAMUSCULAR; INTRAVENOUS; SUBCUTANEOUS at 18:42

## 2021-05-24 RX ADMIN — MEROPENEM 1 G: 1 INJECTION, POWDER, FOR SOLUTION INTRAVENOUS at 21:10

## 2021-05-24 RX ADMIN — ACETAMINOPHEN 500 MG: 325 TABLET, FILM COATED ORAL at 08:02

## 2021-05-24 RX ADMIN — ENOXAPARIN SODIUM 40 MG: 40 INJECTION SUBCUTANEOUS at 05:08

## 2021-05-24 RX ADMIN — HYDROMORPHONE HYDROCHLORIDE 2 MG: 2 INJECTION, SOLUTION INTRAMUSCULAR; INTRAVENOUS; SUBCUTANEOUS at 06:27

## 2021-05-24 RX ADMIN — GABAPENTIN 300 MG: 300 CAPSULE ORAL at 05:08

## 2021-05-24 RX ADMIN — HYDROMORPHONE HYDROCHLORIDE 2 MG: 2 INJECTION, SOLUTION INTRAMUSCULAR; INTRAVENOUS; SUBCUTANEOUS at 14:42

## 2021-05-24 RX ADMIN — MAGNESIUM SULFATE 2 G: 2 INJECTION INTRAVENOUS at 08:03

## 2021-05-24 RX ADMIN — MIDODRINE HYDROCHLORIDE 5 MG: 5 TABLET ORAL at 21:10

## 2021-05-24 RX ADMIN — PAROXETINE HYDROCHLORIDE 10 MG: 20 TABLET, FILM COATED ORAL at 05:08

## 2021-05-24 RX ADMIN — MIDODRINE HYDROCHLORIDE 5 MG: 5 TABLET ORAL at 14:43

## 2021-05-24 RX ADMIN — FAMOTIDINE 20 MG: 20 TABLET ORAL at 17:32

## 2021-05-24 RX ADMIN — MEROPENEM 1 G: 1 INJECTION, POWDER, FOR SOLUTION INTRAVENOUS at 14:49

## 2021-05-24 RX ADMIN — HYDROMORPHONE HYDROCHLORIDE 2 MG: 2 INJECTION, SOLUTION INTRAMUSCULAR; INTRAVENOUS; SUBCUTANEOUS at 10:27

## 2021-05-24 RX ADMIN — OXYCODONE 5 MG: 5 TABLET ORAL at 21:10

## 2021-05-24 ASSESSMENT — GAIT ASSESSMENTS
ASSISTIVE DEVICE: FRONT WHEEL WALKER
DISTANCE (FEET): 80
GAIT LEVEL OF ASSIST: MINIMAL ASSIST

## 2021-05-24 ASSESSMENT — PAIN DESCRIPTION - PAIN TYPE
TYPE: ACUTE PAIN
TYPE: ACUTE PAIN;SURGICAL PAIN
TYPE: ACUTE PAIN
TYPE: ACUTE PAIN
TYPE: ACUTE PAIN;CHRONIC PAIN
TYPE: ACUTE PAIN
TYPE: CHRONIC PAIN;ACUTE PAIN
TYPE: ACUTE PAIN
TYPE: ACUTE PAIN
TYPE: ACUTE PAIN;CHRONIC PAIN
TYPE: ACUTE PAIN;SURGICAL PAIN
TYPE: ACUTE PAIN;CHRONIC PAIN

## 2021-05-24 ASSESSMENT — COGNITIVE AND FUNCTIONAL STATUS - GENERAL
TURNING FROM BACK TO SIDE WHILE IN FLAT BAD: A LITTLE
MOBILITY SCORE: 17
CLIMB 3 TO 5 STEPS WITH RAILING: A LOT
SUGGESTED CMS G CODE MODIFIER MOBILITY: CK
MOVING FROM LYING ON BACK TO SITTING ON SIDE OF FLAT BED: A LITTLE
WALKING IN HOSPITAL ROOM: A LITTLE
STANDING UP FROM CHAIR USING ARMS: A LITTLE
MOVING TO AND FROM BED TO CHAIR: A LITTLE

## 2021-05-24 ASSESSMENT — FIBROSIS 4 INDEX: FIB4 SCORE: 0.4

## 2021-05-24 NOTE — FLOWSHEET NOTE
05/24/21 0711   Ventiliation   Vent Clock Discontinued Yes   Ventilator Management Group   Intensivist Group Yes   Spontaneous Breathing Trial (SBT)   Safety screen spontaneous breathing trial (SBT) Proceed with SBT - no exclusion criteria met   Trache Weaning   Placed on T-Piece/Collar and Stopped Vent Clock Yes

## 2021-05-24 NOTE — CARE PLAN
Problem: Pain Management  Goal: Pain level will decrease to patient's comfort goal  Outcome: Not Progressing  Note: Prn pain medications administered q4h    The patient is Stable - Low risk of patient condition declining or worsening    Shift Goals  Clinical Goals: infection control, ween off ventilator  Patient Goals: pain control    Progress made toward(s) clinical / shift goals:  Pt is now on T piece and tolerating it well.     Patient is not progressing towards the following goals:      Problem: Pain Management  Goal: Pain level will decrease to patient's comfort goal  Outcome: Not Progressing  Note: Prn pain medications administered q4h

## 2021-05-24 NOTE — PROGRESS NOTES
"Critical Care Progress Note    Date of admission  4/16/2021    Chief Complaint  \"50 y.o. female the past medical history of pseudotumor cerebri status post  shunt by Dr. Oh, chronic pain with history of placement of nerve stimulator, vasculitis, right anterior chest port for home IV meds with recurrent infection who presented 4/11/2021 with to [facility] with recurrent port infection. Found to have MSSA bacteremia, endocarditis, septic pulmonary emboli/empyema/pneumatocele, and CSF pleocytosis concerning for  shunt involvement.  Seen by Dr. Oh, NSGY, at [facility] who did not recommend  shunt removal.  Seen by Dr. Vargas here for concern of fluid collection around her spinal stimulator and he recommended against removal. Remains intubated, encephalopathic.\"  5/17:Critical care signing back on tonight after total right lung decortication for organized empyema  She was recovered in PACU, and is now on T-piece.  Follow-up ABG showed mild respiratory acidosis  She has 2 right chest tubes with marked improvement in aeration of right lung postoperatively, no pneumothorax on CXR  Minimize sedation, dexmedetomidine tonight if needed, pulmonary hygiene, follow-up arterial blood gas, positive airway pressure if needed     Hospital Course  4/16 admitted to ICU  4/17 VD 2, 2 FFP, pRBC overnight; new chest tube per gen surg  4/18 VD 3, TPa/Dornase with 1400 cc from R chest tube  4/26 R chest tube not functioning, d/c it.  broke his leg and going to OR today, so not available currently.  VD 11.  8/30%. RSBI immediately high on SBT attempt. Followed commands for RN  4/27 VD12. 8/30%. Not tolerating wean, high RSBI and tachycardic.   4/28 VD 13. VATS with Dr. Ganser.    4/29 VD 14. 8/30%. Still not tolerating wean d/t severe tachycardia just with SAT. Trialed precedex and she required fentanyl up to 600/hr to tolerate, so back on propofol. Chest tubes -40  4/30 VD 15. 8/30%. Try ketamine/versed as sedative. Severe " tachycardia and HTN with weaning down propofol. Plan for perc trach tomorrow. Chest tubes -40  5/1 perc trach. PICC placed. Chest tubes remain -40.  5/2 Able to spont well on sedation, but very tachy/HTN when sedation is turned down.  5/3 midazolam weaned down to 3mg/min  5/4 off versed and ketamine gtt  5/5 trials of SBT w/ trach  5/6 T piece trial during day, mobilize to chair x 2, rest vent 8/8 at night, started methadone, gabapentin   5/7 T piece during day with, rest PS 8/8 overnight, spacing out dilaudid PRNs  5/8 T-piece during day, still with anxiety weaning dex gtt  5/10 CT chest  5/11 will require thoracotomy and decortication  5/12 off ventilator x 24 hours  5/13 replete magnesium, remains on T-piece, ambulating, transfer to med/surg  5/17 -right thoracotomy with total lung decortication, remains on T-piece postoperatively did well  5/18 on T piece but with increase tachycardia 2l IV fluids, increase pain regime, lethargic just asking for dilaudid  5/19 am shock, bolused fluid and gave blood, place on vent and checked multiple labs, stress dose steroids, inc antibiotic coverage re-cultured, ill appearing, was on pressor and febrile  5/20 on vent to keep right lung open, 1 prbc, low dose pressor occaisional  5/21 on vent sponting well, 1 prbc given, off pressor/started midodrine, klebsiella on meropenem, CTx2 with minimal output, wound vac  5/22 On vent, s/p bronch with no significant findings, low dose dex gtt  5/23 On vent, Tpiece trials     Overnight events/rouding:  Reviewed last 24 hour events:    Awake, follows, writes notes and follows  ST 100s  SBp 90-140s  UO good  Trach day 25  T piece 13 hrs yesterday  PSMV voice good  CXR no CXR  CTs min output   40/8  Lovenox  Merrem 5/7 -> back to [facility] to complete   Last BC neg  Keppra  PT/OT    T-piece trials  Mobilize     YESTERDAY  Neuro: Intact moves all follows commands  Neuro: awake following, dilaudid 2mg Q4  HR:   SBP:   Tmax:  afebrile  GI: impact at goal, BM 5/22  UOP: purwick goal  Lines: CT no output, picc line  Resp: t piecing 10l/40%, some secretions tan yellow   Vte: lovenox  PPI/H2:pepcid  Antibx: meropenem    Lasix 20mg IV  Mobilize, t piece trials  Stop dex gtt  Mag and k replaced    Review of Systems  Review of Systems   Unable to perform ROS: Intubated   Tracheostomy    Vital Signs for last 24 hours   Temp:  [36.2 °C (97.1 °F)-36.6 °C (97.8 °F)] 36.3 °C (97.3 °F)  Pulse:  [] 88  Resp:  [14-30] 20  BP: ()/(51-85) 126/71  SpO2:  [94 %-100 %] 96 %    Hemodynamic parameters for last 24 hours       Respiratory Information for the last 24 hours  Vent Mode: ASV  PEEP/CPAP: 8  MAP: 14  Control VTE (exp VT): 354    Physical Exam   Physical Exam  Vitals and nursing note reviewed.   Constitutional:       General: She is not in acute distress.     Appearance: She is ill-appearing. She is not toxic-appearing or diaphoretic.      Comments: Laying in bed chronic ill appearing improved color   HENT:      Head: Normocephalic and atraumatic.      Comments: Purple hair     Nose: Nose normal.      Comments: cortrac     Mouth/Throat:      Mouth: Mucous membranes are moist.      Pharynx: Oropharynx is clear.   Eyes:      General: No scleral icterus.     Pupils: Pupils are equal, round, and reactive to light.   Neck:      Comments: Tracheostomy in place on vent  Cardiovascular:      Rate and Rhythm: Regular rhythm. Tachycardia present.      Pulses: Normal pulses.      Heart sounds: Normal heart sounds. No murmur heard.   No gallop.    Pulmonary:      Effort: Pulmonary effort is normal.      Breath sounds: Rhonchi present. No wheezing.      Comments: T piece, CT x2 right chest with wound vac in place no redness around CT or vac    Abdominal:      General: There is no distension.      Palpations: Abdomen is soft. There is no mass.      Tenderness: There is no abdominal tenderness. There is no right CVA tenderness, left CVA tenderness,  guarding or rebound.      Hernia: No hernia is present.   Musculoskeletal:         General: No swelling or tenderness. Normal range of motion.      Cervical back: Normal range of motion and neck supple.      Right lower leg: No edema.      Left lower leg: No edema.   Skin:     General: Skin is warm and dry.      Capillary Refill: Capillary refill takes less than 2 seconds.      Coloration: Skin is pale. Skin is not jaundiced.      Findings: No bruising or erythema.   Neurological:      General: No focal deficit present.      Comments: Awake, follows commands, moves all 4 extremities, mouths words, writing on board, CN intact   Psychiatric:         Mood and Affect: Mood is not anxious.         Behavior: Behavior is not agitated. Behavior is cooperative.         Medications  Current Facility-Administered Medications   Medication Dose Route Frequency Provider Last Rate Last Admin   • HYDROmorphone (DILAUDID) injection 2 mg  2 mg Intravenous Q4HRS PRN Juventino Lozano M.D.   2 mg at 05/24/21 1442   • famotidine (PEPCID) tablet 20 mg  20 mg Enteral Tube BID Juventino Lozano M.D.   20 mg at 05/24/21 0508   • enoxaparin (LOVENOX) inj 40 mg  40 mg Subcutaneous DAILY Juventino Lozano M.D.   40 mg at 05/24/21 0508   • midodrine (PROAMATINE) tablet 5 mg  5 mg Enteral Tube Q8HRS Juventino Lozano M.D.   5 mg at 05/24/21 1443   • phenylephrine (VEDA-SYNEPHRINE) 40 mg in  mL Infusion  0-300 mcg/min Intravenous Continuous Juventino Lozano M.D.   Stopped at 05/22/21 1715   • meropenem (MERREM) 1 g in  mL IVPB  1,000 mg Intravenous Q8HRS Dixie Albarran M.D.   Stopped at 05/24/21 1519   • Respiratory Therapy Consult   Nebulization Continuous RT Juventino Lozano M.D.       • MD Alert...ICU Electrolyte Replacement per Pharmacy   Other PHARMACY TO DOSE Juventino Lozano M.D.       • lidocaine (XYLOCAINE) 1 % injection 1-2 mL  1-2 mL Tracheal Tube Q30 MIN PRN Juventino B. Blake, M.D.       • levETIRAcetam (KEPPRA) tablet 500  mg  500 mg Enteral Tube BID Juventino Lozano M.D.   500 mg at 05/24/21 0508   • gabapentin (NEURONTIN) capsule 300 mg  300 mg Enteral Tube Q8HRS Juventino Lozano M.D.   300 mg at 05/24/21 1443   • methadone (DOLOPHINE) tablet 30 mg  30 mg Enteral Tube DAILY Juventino Lozano M.D.   30 mg at 05/24/21 0507   • diazePAM (VALIUM) tablet 5 mg  5 mg Enteral Tube QHS PRN Raphael Armstrong M.D.   5 mg at 05/19/21 2136   • ondansetron (ZOFRAN) syringe/vial injection 4 mg  4 mg Intravenous Q4HRS PRN Juventino Lozano M.D.   4 mg at 05/13/21 0431   • acetaminophen (TYLENOL) tablet 500 mg  500 mg Enteral Tube TID Juventino Lozano M.D.   500 mg at 05/24/21 1443   • oxyCODONE immediate-release (ROXICODONE) tablet 5 mg  5 mg Enteral Tube Q4HRS PRN Juventino Lozano M.D.   5 mg at 05/22/21 1728   • labetalol (NORMODYNE/TRANDATE) injection 10 mg  10 mg Intravenous Q4HRS PRN Juventino Lozano M.D.   10 mg at 05/04/21 0226   • Pharmacy Consult: Enteral tube insertion - review meds/change route/product selection  1 Each Other PHARMACY TO DOSE Juventino Lozano M.D.       • acetaminophen (Tylenol) tablet 650 mg  650 mg Enteral Tube Q4HRS PRN Juventino Lozano M.D.   650 mg at 05/12/21 1233   • magnesium hydroxide (MILK OF MAGNESIA) suspension 30 mL  30 mL Enteral Tube QDAY PRN Juventino Lozano M.D.        And   • senna-docusate (PERICOLACE or SENOKOT S) 8.6-50 MG per tablet 2 tablet  2 tablet Enteral Tube BID Juventino Lozano M.D.   2 tablet at 05/24/21 0507    And   • polyethylene glycol/lytes (MIRALAX) PACKET 1 Packet  1 Packet Enteral Tube QDAY PRN Juventino B. Blake, M.D.   1 Packet at 05/20/21 0505    And   • bisacodyl (DULCOLAX) suppository 10 mg  10 mg Rectal QDAY PRELISE Lozano M.D.       • DULoxetine (CYMBALTA) capsule 60 mg  60 mg Enteral Tube BID Elliott Salvador M.D.   60 mg at 05/23/21 8508   • PARoxetine (PAXIL) tablet 10 mg  10 mg Enteral Tube QAM Elliott Salvador M.D.   10 mg at 05/24/21 0508   •  risperiDONE (RISPERDAL) tablet 2 mg  2 mg Enteral Tube BID Elliott Salvador M.D.   2 mg at 05/24/21 0507   • ipratropium-albuterol (DUONEB) nebulizer solution  3 mL Nebulization Q2HRS PRN (RT) Juventino Lozano M.D.           Fluids    Intake/Output Summary (Last 24 hours) at 5/24/2021 1708  Last data filed at 5/24/2021 0600  Gross per 24 hour   Intake 630 ml   Output 490 ml   Net 140 ml       Laboratory          Recent Labs     05/22/21 0344 05/23/21 0425 05/24/21 0433   SODIUM 136 136 139   POTASSIUM 4.2 3.7 4.0   CHLORIDE 102 104 103   CO2 29 26 30   BUN 19 13 14   CREATININE 0.18* <0.17* 0.18*   MAGNESIUM 1.7 1.7 1.8   PHOSPHORUS 3.0  --   --    CALCIUM 8.0* 8.0* 8.7     Recent Labs     05/22/21 0344 05/23/21 0425 05/24/21 0433 05/24/21  1500   ALTSGPT  --   --  35  --    ASTSGOT  --   --  27  --    ALKPHOSPHAT  --   --  602*  --    TBILIRUBIN  --   --  0.4  --    PREALBUMIN  --   --   --  23.5   GLUCOSE 94 89 118*  --      Recent Labs     05/22/21 0344 05/23/21 0425 05/24/21 0433   WBC 7.2 6.8 7.5   NEUTSPOLYS 71.80 70.40 70.90   LYMPHOCYTES 17.50* 18.90* 17.80*   MONOCYTES 8.90 8.90 9.90   EOSINOPHILS 0.30 0.10 0.30   BASOPHILS 0.40 0.40 0.30   ASTSGOT  --   --  27   ALTSGPT  --   --  35   ALKPHOSPHAT  --   --  602*   TBILIRUBIN  --   --  0.4     Recent Labs     05/22/21 0344 05/23/21 0425 05/24/21 0433   RBC 2.96* 2.86* 3.06*   HEMOGLOBIN 8.3* 8.2* 8.8*   HEMATOCRIT 27.2* 26.5* 28.7*   PLATELETCT 348 377 410       Imaging  X-Ray:  I have personally reviewed the images and compared with prior images.  EKG:  I have personally reviewed the images and compared with prior images.  CT:    Reviewed  Echo:   Reviewed    Assessment/Plan  * Empyema of right pleural space (HCC)- (present on admission)  Assessment & Plan  R empyema due to septic pulmonary emboli  4/16 Chest tube placement at [facility]  4/18 Upsized by Dr Benedict  4/18-4/20 Received TPA/dornase.  Stopped b/c Hgb dropped requiring transfusion  4/26  was discontinued due to stopped functioning   4/28 s/p right VATS and decortication. Cx grew of Klebsiella pneumonia  5/17 total right lung decortication for organized empyema, right chest tubes x2    Bacteremia due to methicillin susceptible Staphylococcus aureus (MSSA)- (present on admission)  Assessment & Plan  Source presumed right anterior chest line/port with endocarditis  Multiple sources for MSSA including MV/TV vegetation, port, spinal stimulator/ shunt  Status post port removal at [facility]  Last positive blood cultures were 4/17  Status post treatment with nafcillin from 4/16-4/23  ID following, continue meropenem 7days then back to cefepimine    Acute bacterial endocarditis- (present on admission)  Assessment & Plan  MSSA bacteremia at OSH. First negative blood cultures were on 4/17  Infected port removed at [facility]   shunt and spinal stimulator could be seeded, NSGY has consulted on both and don't recommend removal at admission due to HD instability.   MV and TV vegetations with septic pulmonary emboli  Nataly ID following, antibiotics escalated to cefepime for VAP and klebsiella on prior pleural fluid  Follow-up cultures from decortication 5/17 -> none taken    Acute respiratory failure with hypoxia (HCC)- (present on admission)  Assessment & Plan  4/15 Intubated for acute hypoxic respiratory failure at [facility] due to hydro/pneumo, trapped lung, lung abscesses. 5/1 s/p percutaneous tracheostomy  Place back on ventilator for peep and recruitment and rest with her acute shock 5/19 gas exchange good  Monitor timing of attempt t-piece trials  5/21 SBT trials monitor for de-recruitment on CXR  Bronchoscopy 5/22 was benign  T piece trials 5/23      Sepsis (HCC)  Assessment & Plan        Acute encephalopathy- (present on admission)  Assessment & Plan  Reorient and maintain sleep/wake cycle, mobilize  Limit sedative when able  Difficult to treat anxiety and acute/chronic pain    Pleural effusion, left  Assessment &  Plan  Parapneumonic fluid from abscesses  CT guided chest tube placed 4/23  TPA/dornase one dose on 4/24.  Hold d/t dropping Hgb  CT chest 4/25 with resolution of fluid  CT clamped 4/29-no significant accumulation of fluid so d/c 4/30  Monitor for now radiographically-CXR no chnage    Pneumonia  Assessment & Plan  MSSA septic emboli with empyema   BAL 4/20 klebsiella pneumonia  BAL 4/24 still heavy growth of klebsiella  4/28 OR pleural fluid specimen growing klebsiella.   Continue cefepime per ID -> meropenem for 7 days and then back to cefepime  Worsening infiltrates and sputum on right lung place on ventilator -> benign on bronchoscopy for pna 5/22  ABX per ID    Tachycardia  Assessment & Plan  Persistent, sinus tach  Treat underlying causes    S/P  shunt- (present on admission)  Assessment & Plan  History of pseudotumor cerebri  History of  shunt by Dr. Oh  Could be seeded by MSSA, Dr. Oh consulted at [facility], recommended MRI but unable given her spinal stimulator    Shock (HCC)  Assessment & Plan  Undifferentiated acute am of 5/19 - improved  Fluid bolus, PRBC blood cultures, sputum, ID follow with broadening antibiotics  Lactate normal  Hg 6.5 initially, will serial monitor hg, No coagulopathy  emperic rx with stress dose steroids and cortisol ok  Passive leg raise unremarkable     Debility  Assessment & Plan  PT/OT/SLP eval    Elevated alkaline phosphatase level- (present on admission)  Assessment & Plan  Unchanged --> recheck tomorrow  With dilated biliary ducts on imaging, stable  Unable to get MRCP due to nerve stimulator, ?  Eventual ERCP    Goals of care, counseling/discussion  Assessment & Plan  Palliative consulted  Full code    Spinal cord stimulator status  Assessment & Plan  Patient's stimulator is Nuvectra. It is FDA approved as MRI compatible, but the company has gone out of business, so there is no support team to assist with MRI.  Thus, if MRI were performed, our radiologists would need to  protocol it correctly to manage the stimulator. The former rep from [facility] is Andre, [phone].  Information obtained from Dr. Mahoney's office, [phone].    Per  (5/4/2021), it is not MRI compatible unfortunately.     Anasarca  Assessment & Plan  Secondary to chronic critical illness  Optimize nutrition and volume status    Anemia  Assessment & Plan  Daily CBC with conservative transfusion strategy  5/19 transfusion 1 prbc, 5/20 1 prbc, 5/21 1 prbc  Continue to monitor for bleeding in right lung CT's are minimal  Restart vte prophy monitor closely for bleeding 5/23    Prolonged Q-T interval on ECG- (present on admission)  Assessment & Plan  Optimize electrolytes, continuous telemetry monitoring  Avoid QT prolonging medications as able    Chronic pain syndrome- (present on admission)  Assessment & Plan  Chronic back pain and intractable headaches  History of nerve stimulator   Dr Vargas consulted at admission, no plan to remove stimulator due to her clinical instability  Multimodal with scheduled oxycodone, gabapentin, methadone (increased dose 5/11)    Hypomagnesemia  Assessment & Plan  Magnesium 1.8  Replete to keep > 2.0    Hypokalemia  Assessment & Plan  Keep > 4.0    Anxiety- (present on admission)  Assessment & Plan  Improving  Continue Depakote, paxil, cymbalta, gabapentin  Limited as needed benzodiazepines    I have performed a physical exam and reviewed and updated ROS and Plan today (5/24/2021). In review of yesterday's note (5/23/2021), there are no changes except as documented above.     Discussed patient condition and risk of morbidity and/or mortality with RN, RT, Pharmacy, Charge nurse / hot rounds and Patient.  Also reviewed case with Dr. John Ganser.     Patient remains in critical condition from needing ventilator support with titration and dex gtt with titration. Critical care time provided was 45 minutes. This excludes all separate billable procedures.

## 2021-05-24 NOTE — CARE PLAN
Problem: Pain Management  Goal: Pain level will decrease to patient's comfort goal  Outcome: Progressing     Problem: Knowledge Deficit - Standard  Goal: Patient and family/care givers will demonstrate understanding of plan of care, disease process/condition, diagnostic tests and medications  Outcome: Progressing     Problem: Fall Risk  Goal: Patient will remain free from falls  Outcome: Progressing   The patient is Watcher - Medium risk of patient condition declining or worsening    Shift Goals  Clinical Goals: infection control, ween off ventilator  Patient Goals: pain control    Progress made toward(s) clinical / shift goals: maintaining

## 2021-05-24 NOTE — CARE PLAN
Problem: Ventilation  Goal: Ability to achieve and maintain unassisted ventilation or tolerate decreased levels of ventilator support  Description: Document on Vent flowsheet    1.  Support and monitor invasive and noninvasive mechanical ventilation  2.  Monitor ventilator weaning response  3.  Perform ventilator associated pneumonia prevention interventions  4.  Manage ventilation therapy by monitoring diagnostic test results  Outcome: Progressing     Patient placed on t-piece 10L 40% @0815, continue trials as tolerated

## 2021-05-24 NOTE — PROGRESS NOTES
Infectious Disease Daily Progress Note    Date of Service  5/24/2021    Chief Complaint  Meropenem 5/19-present     Previous ABX  Cefepime 4/23 5/18-  s/p 23 days  Cefazolin 5/18-5/19     Thoracoscopy & Decortication - 4/28  Decortication 5/17     PRIOR Rx:   Zosyn 4/22-4/23  Previous: Unasyn, Zosyn, Vanco, Daptomycin  Ceftaroline: start 4/13-4/15  Nafcillin 2g Q4 hrs 4/15-4/23 4/14: Unable to give name of previous NSG or neurologist. Seems confused, but able to say some sentences fluently.   4/15: Line cultures now growing MSSA. As well as from the blood. Repeat blood culture 4/13+ in both sets. Patient has worsening confusion. CSF fluid analyses suggest pleocytosis. Cultures are no growth from the CSF. Chest CT was ordered this morning indicating a loculated right pleural effusion as well as bilateral septic emboli. Dr. Mack spoke with Dr. Oh yesterday and no surgical intervention unless clinical deterioration.  4/16: Increased respiratory distress.  Tachypneic.  CT with loculated collection.  Dr Resendiz not available.  No thoracic surgeon available.  Plans to have pulmonary place CT.  Transferring to ICU.  4/17: Transferred to [facility] last night. Hgb dropped to 6.4 requiring PRBC transfusion. Requiring 2 pressors. Fio2 50%. Febrile 38.8. Pending OR decortication of empyema and hemothorax. Chest tube placed at [facility] shows 8,371 WBC, ,000, 74% N  4/18: Chest tube was upsized by surgery yesterday, no decortication. WBC 22k. Fio2 40%. Vasopressin. Levophed down to 2mcg. Afebrile 24 hrs. Last fever 38.6 on 4/16.   4/19: Still on vent. Levo 3 mcg, vasopressin. Afebrile 72 hours. WBC 21k. BC 4/17 NGTD x 48 hours. Unable to do MRI brain given neurostimulator per RN.   4/20: Remaining afebrile. Hb 6.2 and undergoing transfusion today.WBC 24,100, 5% bands.1700 ml CT output. Copious bloody ET secretions. No pressors. Receiving dornase and TPA per CT. Right pleural effusion not large per CXR today.   4/21: WBC  31k. Bronchoscopy yesterday showing blood. Cultures sent. TPA on hold per RN due to arvind blood from chest tube requiring PRBC transfusion for hgb 6. Pending chest CT today. Per , spinal stimulator is non-MRI compatible. Levophed 10mcg. 30% Fio2. Afebrile.   4/22: Fever 101.3 this am. WBC 20,300 down from 32,200 yesterday. BAL growing Klebsiella pneumoniae but susceptibility panel not set up until today. CTA of brain shows no lesion. CT of chest shows enlarging cavitary lung lesions bilat and large loculated new left pleural effusion and large hydropneumothorax on right. TPA & dornase infusions of right chest ended 4/20 after considerable bleeding requiring transfusion. Hb now down again to 6.8.  4/23: WBC 23k. Fio2 40%. Tmax 38.1. US of stiumlator shows small fluid collection but no drainable abscess. Pending MICAH today. Pending L chest tube placement  4/24: Continue fever 100.8-101.8. WBC 16,600. MICAH shows large vegetations on both tricuspid valve and mitral valve with mild TR & MR.  No aortic root abscess. Left chest tube placed yesterday yielding 8 ml of old bloody fluid. Bronch today revealed copious thick maroon secretions in distal trachea and both mainstem bronchi. On the right these were obstructive. Remaining off pressors. Last positive blood cultures (MSSA) were 4/16, negative 4/17.  4/25: Fever 100.6 this AM. wBC 13,300. Hb 6.6. CT: right hydropneumothorax increasing, right bronchopleural fistula, mediastinal shift ot the left , multiple cavitary pulmonary nodules, 3.1 cm left basilar mass, splenomegaly. CT of head shows dural thickening over the clivus. Lac+ GNR >100,000 col/ml growing from BAL of 4/24, presumed Klebsiella. Left peural fluid culture negative from 4/23.  4/26: Fever 100.4 last night. WBC 13,500. Hb 7.4. Plts 502,000. ESR >120. UA fairly clear except 30 mg% protein, 2-5 wbc and rare rbc. CXR unchanged except more prominent pulmonary edema. GNR in BAL may be a different species of  Klebsiella, and resistant to ampicillin & tmp-sulfa; confirmation pending.  4/28: Fever 100.8 last night. WBC 27,700. Hb 6.6. Plts 482,000. Creat 0.19. Kleb pneum in BAL on 4/24 is resistant to ampicillin & tmp-sulfa but otherwise sensitive. O2 sat 96% on FIO2 30% now, PEEP 8. Has been re-evaluated by thoracic surgeon, Dr. Ganser, who believes she will not wean without decortication on the right. Surgery anticipated today.  4/29: S/P right thoracoscopy with decortication with cultures NG at 24 hrs.  4/30: Had a bronch due to hypoxia and hypotension. CXR with worsening right hemithorax pulmonary opacities. Bloody mucous plugs removed. Pressors started. Febrile this am. Tachy in 130s. Pressors just removed. Tolerating vent, but not a SBT candidate at the moment.  5/1: Small air leak CT-2 every ~ 2/3 breathes. The K. pneumoniae from her thoracoscopy is sensitive to her cefepime so no antibiotic change needed.   5/2: No air leak today she tolerated 2  hrs of spontaneous breathing with support today.      5/3: Second day with SBT and doing well now for 2 hrs. Slight bump in temperature and      WBC's but no obvious clinical infectious changed so watch closely.      5/4: Fever still from time to time. WBCs trending up. Chest tubes in place. Trach.       5/5: She clearly is better today she was sitting up in bed with open eyes and follows commands. She still has low grade fever but her WBC's are dropping. Cefepime dose increased yesterday for increased CNS coverage if necessary. She will probably need further thoracic surgery as mentioned by Dr. Ganser. The issue of what to do with her stimulator is still up in the air for now as it probably will need to be removed but she is nort a candidate for more major surgery at this time.       5/7: She continues to improve and under go longer SBT trials. She just had a new PICC placed in her right arm and plan is to D/C central line.        5/8: PICC placed. No fevers. Comfortable.  Cortrak placed. Failing SBTs.  5/9: No acute issues. No fevers. Comfortable. Family at bedside.   5/10: No acute issues, fevers or WBC bumps. No changes in condition. She is to get her CT scan today and be reviewed by surgery  5/11:  at bedside.  Has been referred to LANE.  Repeat CT scan completed.  Results noted.  ? If Dr Ganser has seen.  Still with an empyema:  ? If candidate for further surgery.  5/12: Pending next thoracic surgery. No fever. Anxious.  5/13: ? Surgery date.  No one seems to know.  Had speaking valve in and having swallow eval with speech therapy  5/14: No acute issues. No fevers. Surgery Monday.  5/15: One CT accidentally pulled out yesterday.  Has been on T piece and tolerating. Plans for surgery Monday afternoon.  5/16: On schedule for surgery 5/17: 4:30 pm.  Moved to room T614.  No new issues.  5/17: Was sitting up in the bedside chair since change of shift.  Now walking in hallways with RN and CNA.  5/18: Decortication yesterday with 2 chest tube placement. WBC improving 21->14k  5/19: Hypotension and tachycardia.  Decreased H/H.  Placed back on vent to rest.  Dr Lozano in process of placing new line.  Antibiotics were deesculated yesterday to Cefazolin.  No cx were taken at surgery.  5/20: Pt was febrile yest afternoon 38.2, but now afebrile overnight after meropenem. WBC improved 11k->8k. Fio2 30%. Phenylephrine now off this morning. CXR shows new R consolidations.   5/21: Off pressors.  On vent: FiO2 30%, PEEP 8, PS 5 Received pRBCs yesterday.  5/22: Remains afebrile, off pressors. FiO2 30%.   5/23: Afebrile. FiO2 30%. Bronchoscopy yesterday normal.   5/24: No new cultures obtained at last bronchoscopy.  On  T piece this am: FiO2 30%.    Review of Systems  Review of Systems   Unable to perform ROS: Acuity of condition        Physical Exam  Temp:  [36.2 °C (97.1 °F)-36.6 °C (97.8 °F)] 36.2 °C (97.1 °F)  Pulse:  [] 98  Resp:  [13-30] 14  BP: ()/(40-85) 117/69  SpO2:  [95  %-100 %] 97 %    Physical Exam  Constitutional:       Comments: Purple and pink hair dye.   Cardiovascular:      Rate and Rhythm: Normal rate and regular rhythm.      Pulses: Normal pulses.   Pulmonary:      Comments: CT x two in place.  No air leak noted.  Serous material in tubing.  Abdominal:      General: Abdomen is flat.      Palpations: Abdomen is soft.   Skin:     General: Skin is warm.   Neurological:      General: No focal deficit present.      Mental Status: She is alert.         Intake/Output Summary (Last 24 hours) at 5/24/2021 0748  Last data filed at 5/24/2021 0600  Gross per 24 hour   Intake 1159.27 ml   Output 1490 ml   Net -330.73 ml       Laboratory  Recent Labs     05/22/21  0344 05/23/21  0425 05/24/21  0433   WBC 7.2 6.8 7.5   RBC 2.96* 2.86* 3.06*   HEMOGLOBIN 8.3* 8.2* 8.8*   HEMATOCRIT 27.2* 26.5* 28.7*   MCV 91.9 92.7 93.8   MCH 28.0 28.7 28.8   MCHC 30.5* 30.9* 30.7*   RDW 61.8* 58.7* 59.7*   PLATELETCT 348 377 410   MPV 9.5 9.4 9.3     Recent Labs     05/22/21  0344 05/23/21  0425 05/24/21  0433   SODIUM 136 136 139   POTASSIUM 4.2 3.7 4.0   CHLORIDE 102 104 103   CO2 29 26 30   GLUCOSE 94 89 118*   BUN 19 13 14   CREATININE 0.18* <0.17* 0.18*   CALCIUM 8.0* 8.0* 8.7             Recent Labs     05/22/21  1515   TRIGLYCERIDE 93       Impression   -Severe sepsis  -Catheter related bloodstream infection due to infected port status post removal 4/12-staph aureus  -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       - Acute left empyema s/p chest tube placement 4/23, decortication 5/17 - Klebsiella  -Multiple acute septic pulmonary emboli bilaterally  -Acute bilateral pneumonia due to above     --Pulmonary edema  -Pseudotumor cerebri with underlying  shunt: possible arachnoiditis  -Chronic migraine headaches  -History of autoimmune vasculitis  -Elevated alkaline  phosphatase & bilirubin  -Acute encephalopathy due to sepsis      - anemia due to above          - acute fever on 5/19 likely from secondary VAP          - Secondary VAP R sided pneumonia          - decubitus ulcer sacral.      Plan   No surgical cultures were taken on last decortication or at recent bronchoscopy.   Tracheal aspirate recently 5/19 no growth, blood cultures 5/19 no growth so far. CXR shows worsening R sided infiltrates concerning for secondary infections after reviewing her CXR's between 5/18 and  5/19.  Repeated sputum cultures still exhibiting her original Klebsiella empyema infection. She improved post operatively with defervescence and pressor weaning while on meropenem. Suggestive of either time, or  an anaerobic infection that we werent covering. Continue meropenem for 7 to 8 days, then switch back to cefazolin or Unasyn to complete therapy for endocarditis.     - Continue the Meropenem for  for secondary VAP for 7 days. Target date 5/26.  - Afterwards, deesculate back to either cefazolin vs Unasyn for her Klebsiella empyema and MSSA endocarditis treatment 4 weeks post recent decortication. Target date 6/14.   - Will need repeat Chest CT right before completion of antibiotics  - after completion of IV abx  Will need suppression therapy afterward for her HW (spinal stimulator,  shunt  targeting MSSA  With keflex).      Will continue Meropenem for now.  Make certain she continues to improve before considering attempt to deesculate again.  Vent management per pulmonary.  Now on T piece this am.  Monitor chest tube output    No new recommendations at this time.      Discussed with RN.  35 min spent in critical care management without overlap coordinating care/counseling.

## 2021-05-25 ENCOUNTER — APPOINTMENT (OUTPATIENT)
Dept: RADIOLOGY | Facility: MEDICAL CENTER | Age: 51
DRG: 003 | End: 2021-05-25
Attending: INTERNAL MEDICINE
Payer: MEDICARE

## 2021-05-25 LAB
ANION GAP SERPL CALC-SCNC: 7 MMOL/L (ref 7–16)
BASOPHILS # BLD AUTO: 0.5 % (ref 0–1.8)
BASOPHILS # BLD: 0.04 K/UL (ref 0–0.12)
BUN SERPL-MCNC: 11 MG/DL (ref 8–22)
CALCIUM SERPL-MCNC: 8.5 MG/DL (ref 8.5–10.5)
CHLORIDE SERPL-SCNC: 102 MMOL/L (ref 96–112)
CO2 SERPL-SCNC: 27 MMOL/L (ref 20–33)
CREAT SERPL-MCNC: <0.17 MG/DL (ref 0.5–1.4)
EOSINOPHIL # BLD AUTO: 0.03 K/UL (ref 0–0.51)
EOSINOPHIL NFR BLD: 0.4 % (ref 0–6.9)
ERYTHROCYTE [DISTWIDTH] IN BLOOD BY AUTOMATED COUNT: 59.2 FL (ref 35.9–50)
GLUCOSE SERPL-MCNC: 96 MG/DL (ref 65–99)
HCT VFR BLD AUTO: 29.9 % (ref 37–47)
HGB BLD-MCNC: 9.2 G/DL (ref 12–16)
IMM GRANULOCYTES # BLD AUTO: 0.05 K/UL (ref 0–0.11)
IMM GRANULOCYTES NFR BLD AUTO: 0.6 % (ref 0–0.9)
LYMPHOCYTES # BLD AUTO: 1.48 K/UL (ref 1–4.8)
LYMPHOCYTES NFR BLD: 17.3 % (ref 22–41)
MAGNESIUM SERPL-MCNC: 1.8 MG/DL (ref 1.5–2.5)
MCH RBC QN AUTO: 28.6 PG (ref 27–33)
MCHC RBC AUTO-ENTMCNC: 30.8 G/DL (ref 33.6–35)
MCV RBC AUTO: 92.9 FL (ref 81.4–97.8)
MONOCYTES # BLD AUTO: 0.84 K/UL (ref 0–0.85)
MONOCYTES NFR BLD AUTO: 9.8 % (ref 0–13.4)
NEUTROPHILS # BLD AUTO: 6.13 K/UL (ref 2–7.15)
NEUTROPHILS NFR BLD: 71.4 % (ref 44–72)
NRBC # BLD AUTO: 0 K/UL
NRBC BLD-RTO: 0 /100 WBC
PLATELET # BLD AUTO: 426 K/UL (ref 164–446)
PMV BLD AUTO: 9.2 FL (ref 9–12.9)
POTASSIUM SERPL-SCNC: 3.8 MMOL/L (ref 3.6–5.5)
RBC # BLD AUTO: 3.22 M/UL (ref 4.2–5.4)
SODIUM SERPL-SCNC: 136 MMOL/L (ref 135–145)
WBC # BLD AUTO: 8.6 K/UL (ref 4.8–10.8)

## 2021-05-25 PROCEDURE — 700102 HCHG RX REV CODE 250 W/ 637 OVERRIDE(OP): Performed by: INTERNAL MEDICINE

## 2021-05-25 PROCEDURE — 83735 ASSAY OF MAGNESIUM: CPT

## 2021-05-25 PROCEDURE — A9270 NON-COVERED ITEM OR SERVICE: HCPCS | Performed by: HOSPITALIST

## 2021-05-25 PROCEDURE — 700111 HCHG RX REV CODE 636 W/ 250 OVERRIDE (IP): Performed by: INTERNAL MEDICINE

## 2021-05-25 PROCEDURE — 700105 HCHG RX REV CODE 258: Performed by: INTERNAL MEDICINE

## 2021-05-25 PROCEDURE — A9270 NON-COVERED ITEM OR SERVICE: HCPCS | Performed by: INTERNAL MEDICINE

## 2021-05-25 PROCEDURE — 94640 AIRWAY INHALATION TREATMENT: CPT

## 2021-05-25 PROCEDURE — 85025 COMPLETE CBC W/AUTO DIFF WBC: CPT

## 2021-05-25 PROCEDURE — 700102 HCHG RX REV CODE 250 W/ 637 OVERRIDE(OP): Performed by: HOSPITALIST

## 2021-05-25 PROCEDURE — 71045 X-RAY EXAM CHEST 1 VIEW: CPT

## 2021-05-25 PROCEDURE — 92526 ORAL FUNCTION THERAPY: CPT

## 2021-05-25 PROCEDURE — 99233 SBSQ HOSP IP/OBS HIGH 50: CPT | Performed by: INTERNAL MEDICINE

## 2021-05-25 PROCEDURE — 770022 HCHG ROOM/CARE - ICU (200)

## 2021-05-25 PROCEDURE — 80048 BASIC METABOLIC PNL TOTAL CA: CPT

## 2021-05-25 RX ORDER — HYDROMORPHONE HYDROCHLORIDE 1 MG/ML
.5-2 INJECTION, SOLUTION INTRAMUSCULAR; INTRAVENOUS; SUBCUTANEOUS EVERY 4 HOURS PRN
Status: DISCONTINUED | OUTPATIENT
Start: 2021-05-25 | End: 2021-05-26

## 2021-05-25 RX ORDER — CEFAZOLIN SODIUM 2 G/100ML
2 INJECTION, SOLUTION INTRAVENOUS EVERY 8 HOURS
Status: COMPLETED | OUTPATIENT
Start: 2021-05-26 | End: 2021-06-14

## 2021-05-25 RX ORDER — MAGNESIUM SULFATE HEPTAHYDRATE 40 MG/ML
2 INJECTION, SOLUTION INTRAVENOUS ONCE
Status: COMPLETED | OUTPATIENT
Start: 2021-05-25 | End: 2021-05-25

## 2021-05-25 RX ADMIN — HYDROMORPHONE HYDROCHLORIDE 2 MG: 1 INJECTION, SOLUTION INTRAMUSCULAR; INTRAVENOUS; SUBCUTANEOUS at 17:57

## 2021-05-25 RX ADMIN — MAGNESIUM SULFATE 2 G: 2 INJECTION INTRAVENOUS at 09:36

## 2021-05-25 RX ADMIN — RISPERIDONE 2 MG: 1 TABLET, FILM COATED ORAL at 05:21

## 2021-05-25 RX ADMIN — DULOXETINE HYDROCHLORIDE 60 MG: 60 CAPSULE, DELAYED RELEASE ORAL at 18:08

## 2021-05-25 RX ADMIN — POTASSIUM BICARBONATE 25 MEQ: 978 TABLET, EFFERVESCENT ORAL at 09:35

## 2021-05-25 RX ADMIN — ACETAMINOPHEN 500 MG: 325 TABLET, FILM COATED ORAL at 22:03

## 2021-05-25 RX ADMIN — MIDODRINE HYDROCHLORIDE 5 MG: 5 TABLET ORAL at 05:21

## 2021-05-25 RX ADMIN — DULOXETINE HYDROCHLORIDE 60 MG: 60 CAPSULE, DELAYED RELEASE ORAL at 05:21

## 2021-05-25 RX ADMIN — OXYCODONE 5 MG: 5 TABLET ORAL at 05:21

## 2021-05-25 RX ADMIN — METHADONE HYDROCHLORIDE 30 MG: 10 TABLET ORAL at 05:20

## 2021-05-25 RX ADMIN — FAMOTIDINE 20 MG: 20 TABLET ORAL at 05:21

## 2021-05-25 RX ADMIN — LEVETIRACETAM 500 MG: 500 TABLET ORAL at 05:21

## 2021-05-25 RX ADMIN — GABAPENTIN 300 MG: 300 CAPSULE ORAL at 05:21

## 2021-05-25 RX ADMIN — RISPERIDONE 2 MG: 1 TABLET, FILM COATED ORAL at 18:08

## 2021-05-25 RX ADMIN — DOCUSATE SODIUM 50 MG AND SENNOSIDES 8.6 MG 2 TABLET: 8.6; 5 TABLET, FILM COATED ORAL at 05:21

## 2021-05-25 RX ADMIN — OXYCODONE 5 MG: 5 TABLET ORAL at 14:06

## 2021-05-25 RX ADMIN — LEVETIRACETAM 500 MG: 500 TABLET ORAL at 18:08

## 2021-05-25 RX ADMIN — MEROPENEM 1 G: 1 INJECTION, POWDER, FOR SOLUTION INTRAVENOUS at 05:20

## 2021-05-25 RX ADMIN — HYDROMORPHONE HYDROCHLORIDE 2 MG: 1 INJECTION, SOLUTION INTRAMUSCULAR; INTRAVENOUS; SUBCUTANEOUS at 22:02

## 2021-05-25 RX ADMIN — ACETAMINOPHEN 500 MG: 325 TABLET, FILM COATED ORAL at 14:06

## 2021-05-25 RX ADMIN — GABAPENTIN 300 MG: 300 CAPSULE ORAL at 14:06

## 2021-05-25 RX ADMIN — GABAPENTIN 300 MG: 300 CAPSULE ORAL at 22:03

## 2021-05-25 RX ADMIN — OXYCODONE 5 MG: 5 TABLET ORAL at 10:13

## 2021-05-25 RX ADMIN — PAROXETINE HYDROCHLORIDE 10 MG: 20 TABLET, FILM COATED ORAL at 05:21

## 2021-05-25 RX ADMIN — MEROPENEM 1 G: 1 INJECTION, POWDER, FOR SOLUTION INTRAVENOUS at 22:03

## 2021-05-25 RX ADMIN — MEROPENEM 1 G: 1 INJECTION, POWDER, FOR SOLUTION INTRAVENOUS at 14:45

## 2021-05-25 RX ADMIN — HYDROMORPHONE HYDROCHLORIDE 2 MG: 2 INJECTION, SOLUTION INTRAMUSCULAR; INTRAVENOUS; SUBCUTANEOUS at 06:47

## 2021-05-25 RX ADMIN — ENOXAPARIN SODIUM 40 MG: 40 INJECTION SUBCUTANEOUS at 05:21

## 2021-05-25 RX ADMIN — HYDROMORPHONE HYDROCHLORIDE 2 MG: 2 INJECTION, SOLUTION INTRAMUSCULAR; INTRAVENOUS; SUBCUTANEOUS at 02:40

## 2021-05-25 RX ADMIN — OXYCODONE 5 MG: 5 TABLET ORAL at 01:10

## 2021-05-25 RX ADMIN — MIDODRINE HYDROCHLORIDE 5 MG: 5 TABLET ORAL at 22:03

## 2021-05-25 RX ADMIN — ACETAMINOPHEN 500 MG: 325 TABLET, FILM COATED ORAL at 09:35

## 2021-05-25 RX ADMIN — DIAZEPAM 5 MG: 5 TABLET ORAL at 03:06

## 2021-05-25 RX ADMIN — MIDODRINE HYDROCHLORIDE 5 MG: 5 TABLET ORAL at 14:06

## 2021-05-25 ASSESSMENT — PAIN DESCRIPTION - PAIN TYPE
TYPE: ACUTE PAIN;CHRONIC PAIN
TYPE: ACUTE PAIN
TYPE: ACUTE PAIN
TYPE: ACUTE PAIN;CHRONIC PAIN
TYPE: ACUTE PAIN;CHRONIC PAIN
TYPE: ACUTE PAIN
TYPE: ACUTE PAIN;CHRONIC PAIN
TYPE: ACUTE PAIN
TYPE: ACUTE PAIN;CHRONIC PAIN

## 2021-05-25 ASSESSMENT — ENCOUNTER SYMPTOMS
HEADACHES: 1
COUGH: 0
NAUSEA: 1

## 2021-05-25 ASSESSMENT — FIBROSIS 4 INDEX: FIB4 SCORE: 0.51

## 2021-05-25 NOTE — ASSESSMENT & PLAN NOTE
Encourage mobility  Aggressive pulmonary toilet  Encourage coughing every-2 hours while awake  Suction as needed through tracheostomy  Chest tubes in place for effusion/empyema, transfer removed 5/26  Chest x-ray improving, recheck PRN

## 2021-05-25 NOTE — PROGRESS NOTES
"Progress Note:  5/25/2021, 12:00 PM    S: No acute events. In chair. 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]      Priority: High   • Acute respiratory failure with hypoxia (HCC) [J96.01]      Priority: High   • Acute bacterial endocarditis [I33.0]      Priority: High   • Empyema of right pleural space (HCC) [J86.9]      Priority: High   • Bacteremia due to methicillin susceptible Staphylococcus aureus (MSSA) [R78.81, B95.61]      Priority: High   • Sepsis (HCC) [A41.9]      Priority: High   • Acute encephalopathy [G93.40]      Priority: High   • Fever [R50.9]      Priority: Medium   • Pleural effusion, left [J90]      Priority: Medium   • Atelectasis [J98.11]      Priority: Medium   • CSF pleocytosis [D72.9]      Priority: Medium   • Pneumonia [J18.9]      Priority: Medium   • Tachycardia [R00.0]      Priority: Medium   • Pseudotumor cerebri [G93.2]      Priority: Medium     IMO load March 2020     • S/P  shunt [Z98.2]      Priority: Medium   • Prolonged Q-T interval on ECG [R94.31]      Priority: Low   • History of vasculitis [Z86.79]      Priority: Low   • History of complicated migraines [G43.109]      Priority: Low   • Hyponatremia [E87.1]      Priority: Low   • H/O: CVA (cerebrovascular accident) [Z86.73]      Priority: Low   • Chronic pain syndrome [G89.4]      Priority: Low   • Thrombocytopenia (HCC) [D69.6]      Priority: Low   • Hypokalemia [E87.6]      Priority: Low   • Hypomagnesemia [E83.42]      Priority: Low   • Anxiety [F41.9]      Priority: Low   • Shock (HCC) " [R57.9]    • Debility [R53.81]    • Elevated alkaline phosphatase level [R74.8]    • Spinal cord stimulator status [Z96.89]    • Goals of care, counseling/discussion [Z71.89]    • Pulmonary abscess (HCC) [J85.2]    • Anasarca [R60.1]    • Thrombocytosis (HCC) [D47.3]    • GERD (gastroesophageal reflux disease) [K21.9]    • Septic shock (HCC) [A41.9, R65.21]    • Anemia [D64.9]          P:   -Chest tubes to water seal (discussed with nursing)  -Plan for chest tube removal tomorrow  -Continue aggressive pulm hygiene  -Remove Prevena when it's battery dies and beeps      Drew Vasquez M.D.  [facility]  [phone]

## 2021-05-25 NOTE — PROGRESS NOTES
"Critical Care Progress Note    Date of admission  4/16/2021    Chief Complaint  \"50 y.o. female the past medical history of pseudotumor cerebri status post  shunt by Dr. Oh, chronic pain with history of placement of nerve stimulator, vasculitis, right anterior chest port for home IV meds with recurrent infection who presented 4/11/2021 with to [facility] with recurrent port infection. Found to have MSSA bacteremia, endocarditis, septic pulmonary emboli/empyema/pneumatocele, and CSF pleocytosis concerning for  shunt involvement.  Seen by Dr. Oh, NSGY, at [facility] who did not recommend  shunt removal.  Seen by Dr. Vargas here for concern of fluid collection around her spinal stimulator and he recommended against removal. Remains intubated, encephalopathic.\"  5/17:Critical care signing back on tonight after total right lung decortication for organized empyema  She was recovered in PACU, and is now on T-piece.  Follow-up ABG showed mild respiratory acidosis  She has 2 right chest tubes with marked improvement in aeration of right lung postoperatively, no pneumothorax on CXR  Minimize sedation, dexmedetomidine tonight if needed, pulmonary hygiene, follow-up arterial blood gas, positive airway pressure if needed     Hospital Course  4/16 admitted to ICU  4/17 VD 2, 2 FFP, pRBC overnight; new chest tube per gen surg  4/18 VD 3, TPa/Dornase with 1400 cc from R chest tube  4/26 R chest tube not functioning, d/c it.  broke his leg and going to OR today, so not available currently.  VD 11.  8/30%. RSBI immediately high on SBT attempt. Followed commands for RN  4/27 VD12. 8/30%. Not tolerating wean, high RSBI and tachycardic.   4/28 VD 13. VATS with Dr. Ganser.    4/29 VD 14. 8/30%. Still not tolerating wean d/t severe tachycardia just with SAT. Trialed precedex and she required fentanyl up to 600/hr to tolerate, so back on propofol. Chest tubes -40  4/30 VD 15. 8/30%. Try ketamine/versed as sedative. Severe " "tachycardia and HTN with weaning down propofol. Plan for perc trach tomorrow. Chest tubes -40  5/1 perc trach. PICC placed. Chest tubes remain -40.  5/2 Able to spont well on sedation, but very tachy/HTN when sedation is turned down.  5/3 midazolam weaned down to 3mg/min  5/4 off versed and ketamine gtt  5/5 trials of SBT w/ trach  5/6 T piece trial during day, mobilize to chair x 2, rest vent 8/8 at night, started methadone, gabapentin   5/7 T piece during day with, rest PS 8/8 overnight, spacing out dilaudid PRNs  5/8 T-piece during day, still with anxiety weaning dex gtt  5/10 CT chest  5/11 will require thoracotomy and decortication  5/12 off ventilator x 24 hours  5/13 replete magnesium, remains on T-piece, ambulating, transfer to med/surg  5/17 -right thoracotomy with total lung decortication, remains on T-piece postoperatively did well  5/18 on T piece but with increase tachycardia 2l IV fluids, increase pain regime, lethargic just asking for dilaudid  5/19 am shock, bolused fluid and gave blood, place on vent and checked multiple labs, stress dose steroids, inc antibiotic coverage re-cultured, ill appearing, was on pressor and febrile  5/20 on vent to keep right lung open, 1 prbc, low dose pressor occaisional  5/21 on vent sponting well, 1 prbc given, off pressor/started midodrine, klebsiella on meropenem, CTx2 with minimal output, wound vac  5/22 On vent, s/p bronch with no significant findings, low dose dex gtt  5/23 On vent, Tpiece trials, merrem  5/24 On vent, Tpiece trials, Merrem, LTACH consult placed    Overnight events/rouding:  Reviewed last 24 hour events:    Awake and writing notes  Follows  SR/ST 80-110s  SBp 130-150s  UO adequate  Tpiece all night - \"some\" SOB/fatigue - looks ok  30%, WOB ok, RR 20s, sats 96%  CTs x2 on R - 20cc from each tube last 24hrs  CXR with improvements in right-sided pleural process, no PTX  Merrem 6/7 -> Ancef per ID  WBC 8.6  Tm 97.8  Last BC neg, last trach asp Kleb " p      D/c IV Narcs, cont Methadone/Oxycodone  Mobilize  LTACH eval pending  Involve Hospitaist  Replete K/Mg    Serial follow-up  Patient ready for FEES per speech, plan for tomorrow  LOC much better in the afternoon off IV narcotics  Patient sat up in a chair for an extended period of time and did well per RN  Reviewed indications for LTACH transfer at length with patient who agrees  Reviewed her plethora of infection issues/complications  Encouraged the patient to work with us to transition off high-dose narcotics  Increase mobility encouraged, patient walked in hallway with therapy today  Patient repeatedly requested IV narcotics until chest tubes removed, thoracic surgery plans on moving them tomorrow placed on waterseal today, x-ray in a.m.    Will continue every 4 as needed Dilaudid until chest tubes pulled tomorrow by Dr. Vasquez    Family at bedside       YESTERDAY  Awake, follows, writes notes and follows  ST 100s  SBp 90-140s  UO good  Trach day 25  T piece 13 hrs yesterday  PSMV voice good  no CXR  CTs min output   40/8  Lovenox  Merrem 5/7 -> back to [facility] to complete per ID  Last BC neg  Keily  PT/OT    T-piece trials  Mobilize    Review of Systems  Review of Systems   Unable to perform ROS: Intubated   Tracheostomy    Vital Signs for last 24 hours   Temp:  [36 °C (96.8 °F)-36.6 °C (97.8 °F)] 36 °C (96.8 °F)  Pulse:  [] 120  Resp:  [11-29] 18  BP: ()/(49-81) 91/50  SpO2:  [94 %-99 %] 98 %    Hemodynamic parameters for last 24 hours       Respiratory Information for the last 24 hours       Physical Exam   Physical Exam  Vitals and nursing note reviewed.   Constitutional:       General: She is not in acute distress.     Appearance: She is ill-appearing (Looks better on lower amount of narcotics). She is not toxic-appearing or diaphoretic.      Comments: Laying in bed chronic ill appearing improved color   HENT:      Head: Normocephalic and atraumatic.      Comments: Purple hair      Nose: Nose normal.      Comments: cortrack     Mouth/Throat:      Mouth: Mucous membranes are moist.      Pharynx: Oropharynx is clear.   Eyes:      General: No scleral icterus.     Pupils: Pupils are equal, round, and reactive to light.   Neck:      Comments: Tracheostomy in place on vent  Cardiovascular:      Rate and Rhythm: Regular rhythm. Tachycardia present.      Pulses: Normal pulses.      Heart sounds: Normal heart sounds. No murmur heard.   No gallop.    Pulmonary:      Effort: Pulmonary effort is normal.      Breath sounds: Rhonchi (Improved) present. No wheezing.      Comments: T piece, CT x2 right chest with wound vac in place no redness around CT or vac min output, no airleak, diminished breath sounds at right base may be better    Abdominal:      General: There is no distension.      Palpations: Abdomen is soft. There is no mass.      Tenderness: There is no abdominal tenderness. There is no right CVA tenderness, left CVA tenderness, guarding or rebound.      Hernia: No hernia is present.   Musculoskeletal:         General: No swelling or tenderness. Normal range of motion.      Cervical back: Normal range of motion and neck supple.      Right lower leg: No edema.      Left lower leg: No edema.   Skin:     General: Skin is warm and dry.      Capillary Refill: Capillary refill takes less than 2 seconds.      Coloration: Skin is not jaundiced.      Findings: No bruising or erythema.   Neurological:      General: No focal deficit present.      Mental Status: She is lethargic.      GCS: GCS eye subscore is 4. GCS verbal subscore is 1. GCS motor subscore is 6.      Comments: Awake/lethargic, follows commands, moves all 4 extremities, mouths words, writing on board, CN intact, later in day LOC improved   Psychiatric:         Mood and Affect: Mood is not anxious.         Behavior: Behavior is not agitated. Behavior is cooperative.         Medications  Current Facility-Administered Medications   Medication  Dose Route Frequency Provider Last Rate Last Admin   • [START ON 5/26/2021] ceFAZolin in dextrose (ANCEF) IVPB premix 2 g  2 g Intravenous Q8HRS ISABEL Rees M.D.       • HYDROmorphone (Dilaudid) injection 0.5-2 mg  0.5-2 mg Intravenous Q4HRS PRN Man Wahl M.D.       • enoxaparin (LOVENOX) inj 40 mg  40 mg Subcutaneous DAILY Juventino Lozano M.D.   40 mg at 05/25/21 0521   • midodrine (PROAMATINE) tablet 5 mg  5 mg Enteral Tube Q8HRS Juventino Lozano M.D.   5 mg at 05/25/21 1406   • meropenem (MERREM) 1 g in  mL IVPB  1,000 mg Intravenous Q8HRS Dixie Ablarran M.D.   Stopped at 05/25/21 1515   • Respiratory Therapy Consult   Nebulization Continuous RT Juventino Lozano M.D.       • MD Alert...ICU Electrolyte Replacement per Pharmacy   Other PHARMACY TO DOSE Juventino Lozano M.D.       • lidocaine (XYLOCAINE) 1 % injection 1-2 mL  1-2 mL Tracheal Tube Q30 MIN PRN Juventino Lozano M.D.       • levETIRAcetam (KEPPRA) tablet 500 mg  500 mg Enteral Tube BID Juventino Lozano M.D.   500 mg at 05/25/21 0521   • gabapentin (NEURONTIN) capsule 300 mg  300 mg Enteral Tube Q8HRS Juventino Lozano M.D.   300 mg at 05/25/21 1406   • methadone (DOLOPHINE) tablet 30 mg  30 mg Enteral Tube DAILY Juventino Lozano M.D.   30 mg at 05/25/21 0520   • diazePAM (VALIUM) tablet 5 mg  5 mg Enteral Tube QHS PRN Raphael Armstrong M.D.   5 mg at 05/25/21 0306   • ondansetron (ZOFRAN) syringe/vial injection 4 mg  4 mg Intravenous Q4HRS PRN Juventino Lozano M.D.   4 mg at 05/13/21 0431   • acetaminophen (TYLENOL) tablet 500 mg  500 mg Enteral Tube TID Juventino Lozano M.D.   500 mg at 05/25/21 1406   • oxyCODONE immediate-release (ROXICODONE) tablet 5 mg  5 mg Enteral Tube Q4HRS PRN Juventino Lozano M.D.   5 mg at 05/25/21 1406   • labetalol (NORMODYNE/TRANDATE) injection 10 mg  10 mg Intravenous Q4HRS PRN Juventino Lozano M.D.   10 mg at 05/04/21 0226   • Pharmacy Consult: Enteral tube insertion - review meds/change  route/product selection  1 Each Other PHARMACY TO DOSE Juventino Lozano M.D.       • acetaminophen (Tylenol) tablet 650 mg  650 mg Enteral Tube Q4HRS PRN Juventino Lozano M.D.   650 mg at 05/12/21 1233   • magnesium hydroxide (MILK OF MAGNESIA) suspension 30 mL  30 mL Enteral Tube QDAY PRN Juventino Lozano M.D.        And   • senna-docusate (PERICOLACE or SENOKOT S) 8.6-50 MG per tablet 2 tablet  2 tablet Enteral Tube BID Juventino Lozano M.D.   2 tablet at 05/25/21 0521    And   • polyethylene glycol/lytes (MIRALAX) PACKET 1 Packet  1 Packet Enteral Tube QDAY PRN Juventino Lozano M.D.   1 Packet at 05/20/21 0505    And   • bisacodyl (DULCOLAX) suppository 10 mg  10 mg Rectal QDAY PRN Juventino Lozano M.D.       • DULoxetine (CYMBALTA) capsule 60 mg  60 mg Enteral Tube BID Elliott Salvador M.D.   60 mg at 05/25/21 0521   • PARoxetine (PAXIL) tablet 10 mg  10 mg Enteral Tube QAM Elliott Salvador M.D.   10 mg at 05/25/21 0521   • risperiDONE (RISPERDAL) tablet 2 mg  2 mg Enteral Tube BID Elliott Salvador M.D.   2 mg at 05/25/21 0521   • ipratropium-albuterol (DUONEB) nebulizer solution  3 mL Nebulization Q2HRS PRN (RT) Juventino Lozano M.D.           Fluids    Intake/Output Summary (Last 24 hours) at 5/25/2021 1645  Last data filed at 5/25/2021 0800  Gross per 24 hour   Intake 1910 ml   Output 1640 ml   Net 270 ml       Laboratory          Recent Labs     05/23/21  0425 05/24/21  0433 05/25/21  0116   SODIUM 136 139 136   POTASSIUM 3.7 4.0 3.8   CHLORIDE 104 103 102   CO2 26 30 27   BUN 13 14 11   CREATININE <0.17* 0.18* <0.17*   MAGNESIUM 1.7 1.8 1.8   CALCIUM 8.0* 8.7 8.5     Recent Labs     05/23/21 0425 05/24/21 0433 05/24/21  1500 05/25/21  0116   ALTSGPT  --  35  --   --    ASTSGOT  --  27  --   --    ALKPHOSPHAT  --  602*  --   --    TBILIRUBIN  --  0.4  --   --    PREALBUMIN  --   --  23.5  --    GLUCOSE 89 118*  --  96     Recent Labs     05/23/21 0425 05/24/21 0433 05/25/21  0116   WBC 6.8 7.5  8.6   NEUTSPOLYS 70.40 70.90 71.40   LYMPHOCYTES 18.90* 17.80* 17.30*   MONOCYTES 8.90 9.90 9.80   EOSINOPHILS 0.10 0.30 0.40   BASOPHILS 0.40 0.30 0.50   ASTSGOT  --  27  --    ALTSGPT  --  35  --    ALKPHOSPHAT  --  602*  --    TBILIRUBIN  --  0.4  --      Recent Labs     05/23/21  0425 05/24/21  0433 05/25/21  0116   RBC 2.86* 3.06* 3.22*   HEMOGLOBIN 8.2* 8.8* 9.2*   HEMATOCRIT 26.5* 28.7* 29.9*   PLATELETCT 377 410 426       Imaging  X-Ray:  I have personally reviewed the images and compared with prior images.  EKG:  I have personally reviewed the images and compared with prior images.  CT:    Reviewed  Echo:   Reviewed    Assessment/Plan  * Empyema of right pleural space (HCC)- (present on admission)  Assessment & Plan  R empyema due to septic pulmonary emboli  4/16 Chest tube placement at [facility]  4/18 Upsized by Dr Benedict  4/18-4/20 Received TPA/dornase.  Stopped b/c Hgb dropped requiring transfusion  4/26 was discontinued due to stopped functioning   4/28 s/p right VATS and decortication. Cx grew of Klebsiella pneumonia  5/17 total right lung decortication for organized empyema, right chest tubes x2    Bacteremia due to methicillin susceptible Staphylococcus aureus (MSSA)- (present on admission)  Assessment & Plan  Source presumed right anterior chest line/port with endocarditis  Multiple sources for MSSA including MV/TV vegetation, port, spinal stimulator/ shunt  Status post port removal at [facility]  Last positive blood cultures were 4/17  Status post treatment with nafcillin from 4/16-4/23  ID following, continue meropenem 7days then back to Ancef 5/26 as per ID    Acute bacterial endocarditis- (present on admission)  Assessment & Plan  MSSA bacteremia at OSH. First negative blood cultures were on 4/17  Infected port removed at [facility]   shunt and spinal stimulator could be seeded, NSGY has consulted on both and don't recommend removal at admission due to HD instability.   MV and TV vegetations with septic  pulmonary emboli  Nataly ID following, antibiotics escalated to cefepime->Merrem for VAP and Klebsiella on prior pleural fluid  Back to Ancef 5/26 per ID, antibiotics to complete 6/14?  Follow-up cultures from decortication 5/17 -> none taken    Acute respiratory failure with hypoxia (HCC)- (present on admission)  Assessment & Plan  4/15 Intubated for acute hypoxic respiratory failure at [facility] due to hydro/pneumo, trapped lung, lung abscesses. 5/1 s/p percutaneous tracheostomy  Place back on ventilator for peep and recruitment and rest with her acute shock 5/19 gas exchange good  5/21 SBT trials monitor for de-recruitment on CXR  Bronchoscopy 5/22 was benign  T piece trials 5/23, off the ventilator since  PSMV trials ongoing, voice better  Chest tubes placed to waterseal 5/25  Follow-up chest x-ray as needed   mobilize      Sepsis (HCC)  Assessment & Plan        Acute encephalopathy- (present on admission)  Assessment & Plan  Reorient and maintain sleep/wake cycle, mobilize  Limit sedative when able, attempting to reduce narcotic burden, patient requesting more narcotics regularly  Difficult to treat anxiety and acute/chronic pain  Improved overall    Pleural effusion, left  Assessment & Plan  Parapneumonic fluid from abscesses  CT guided chest tube placed 4/23  TPA/dornase one dose on 4/24.  Hold d/t dropping Hgb  CT chest 4/25 with resolution of fluid  CT clamped 4/29-no significant accumulation of fluid so d/c 4/30  Monitor for now radiographically-CXR no chnage    Atelectasis  Assessment & Plan  Encourage mobility  Aggressive pulmonary toilet  Encourage coughing every-2 hours while awake  Suction as needed through tracheostomy  Follow-up x-ray as needed  Chest tubes in place for effusion/empyema    Pneumonia  Assessment & Plan  MSSA septic emboli with empyema   BAL 4/20 klebsiella pneumonia  BAL 4/24 still heavy growth of klebsiella  4/28 OR pleural fluid specimen growing klebsiella.   Continue cefepime per ID ->  meropenem for 7 days and then back to cefepime  Worsening infiltrates and sputum on right lung place on ventilator -> benign on bronchoscopy for pna 5/22  ABX per ID    Tachycardia  Assessment & Plan  Persistent, sinus tach  Treat underlying causes    S/P  shunt- (present on admission)  Assessment & Plan  History of pseudotumor cerebri  History of  shunt by Dr. Oh  Could be seeded by MSSA, Dr. Oh consulted at [facility], recommended MRI but unable given her spinal stimulator    Shock (HCC)  Assessment & Plan  Undifferentiated acute am of 5/19 - improved  Fluid bolus, PRBC blood cultures, sputum, ID follow with broadening antibiotics  Lactate normal  Hg 6.5 initially, will serial monitor hg, No coagulopathy  emperic rx with stress dose steroids and cortisol ok  Passive leg raise unremarkable     Debility  Assessment & Plan  PT/OT/SLP eval    Elevated alkaline phosphatase level- (present on admission)  Assessment & Plan  Unchanged --> recheck tomorrow  With dilated biliary ducts on imaging, stable  Unable to get MRCP due to nerve stimulator, ?  Eventual ERCP    Goals of care, counseling/discussion  Assessment & Plan  Palliative consulted  Full code    Spinal cord stimulator status  Assessment & Plan  Patient's stimulator is Nuvectra. It is FDA approved as MRI compatible, but the company has gone out of business, so there is no support team to assist with MRI.  Thus, if MRI were performed, our radiologists would need to protocol it correctly to manage the stimulator. The former rep from [employer] is Andre, [phone].  Information obtained from Dr. Mahoney's office, [phone].    Per  (5/4/2021), it is not MRI compatible unfortunately.     Anasarca  Assessment & Plan  Secondary to chronic critical illness  Optimize nutrition and volume status    Anemia  Assessment & Plan  Daily CBC with conservative transfusion strategy  5/19 transfusion 1 prbc, 5/20 1 prbc, 5/21 1 prbc  Continue to monitor for bleeding in  right lung CT's are minimal  Restart vte prophy monitor closely for bleeding 5/23    Prolonged Q-T interval on ECG- (present on admission)  Assessment & Plan  Optimize electrolytes  Avoid QT prolonging medications as able  Repeat EKG as needed  Cardiac monitoring    Chronic pain syndrome- (present on admission)  Assessment & Plan  Chronic back pain and intractable headaches  History of nerve stimulator   Dr Vargas consulted at admission, no plan to remove stimulator due to her clinical instability  Multimodal with scheduled oxycodone, gabapentin, methadone (increased dose 5/11)  Patient lobbying hard for IV narcotics, will continue them at low-dose through chest tube removal 5/26  On methadone with breakthrough enteral oxycodone  Monitor for the need for pain specialist consultation short-term and long-term    Port or reservoir infection  Assessment & Plan  Current venous access port infection R ant-chest, likely initial infection triggering entire admission to [facility]/Transfer to [facility]  Status post removal again of port 4/12 by Dr. Candelaria  MSSA positive cultures with right-sided endocarditis and septic emboli to lung complicated by right-sided empyema    Hypomagnesemia  Assessment & Plan  Magnesium 1.8  Replete to keep > 2.0    Hypokalemia  Assessment & Plan  Keep > 4.0    Anxiety- (present on admission)  Assessment & Plan  Improving  Continue Depakote, paxil, cymbalta, gabapentin, as clinically improves transition off some of these agents?  Limited as needed benzodiazepines    I have performed a physical exam and reviewed and updated ROS and Plan today (5/25/2021). In review of yesterday's note (5/24/2021), there are no changes except as documented above.     Discussed patient condition and risk of morbidity and/or mortality with Family, RN, RT, Pharmacy, Charge nurse / hot rounds and Patient.

## 2021-05-25 NOTE — CARE PLAN
Ventilator Daily Summary    Trach Day # 26    Ventilator settings changed this shift: None    Weaning trials:T-piece all shift 5L/30%    Respiratory Procedures: None    Plan: Continue current ventilator settings and wean mechanical ventilation as tolerated per physician orders.

## 2021-05-25 NOTE — DISCHARGE PLANNING
Care Transition Team Discharge Planning    Anticipated Discharge Disposition: d/c to LTAC    Action: Lsw spoke to DPa who w/u w/ [facility] admissions liaison.    Per rounds, pt has been medically cleared for d/c since last week.     Lsw reported update to medical team in rounds: the facility is working on insurance auth.     Barriers to Discharge: INS aUTH, open bed    Plan: LSw will continue to follow, and assist w/ d/c planning.

## 2021-05-25 NOTE — CARE PLAN
The patient is Stable - Low risk of patient condition declining or worsening    Shift Goals  Clinical Goals: sleep/wake cycle, safety  Patient Goals: pain control    Progress made toward(s) clinical / shift goals:  pain plan in place for this shift; wctm    Patient is not progressing towards the following goals:      Problem: Pain Management  Goal: Pain level will decrease to patient's comfort goal  Outcome: Progressing     Problem: Knowledge Deficit - Standard  Goal: Patient and family/care givers will demonstrate understanding of plan of care, disease process/condition, diagnostic tests and medications  Outcome: Progressing     Problem: Fall Risk  Goal: Patient will remain free from falls  Outcome: Progressing

## 2021-05-25 NOTE — PROGRESS NOTES
Agency/Facility Name: [facility]  Plan or Request: LUIS left vm for Trini.    0940- LUIS spoke with liaison, per Trini, auth pending.

## 2021-05-25 NOTE — PROGRESS NOTES
Infectious Disease Daily Progress Note    Date of Service  5/25/2021    Chief Complaint  Meropenem 5/19-present     Previous ABX  Cefepime 4/23 5/18-  s/p 23 days  Cefazolin 5/18-5/19     Thoracoscopy & Decortication - 4/28  Decortication 5/17     PRIOR Rx:   Zosyn 4/22-4/23  Previous: Unasyn, Zosyn, Vanco, Daptomycin  Ceftaroline: start 4/13-4/15  Nafcillin 2g Q4 hrs 4/15-4/23 4/14: Unable to give name of previous NSG or neurologist. Seems confused, but able to say some sentences fluently.   4/15: Line cultures now growing MSSA. As well as from the blood. Repeat blood culture 4/13+ in both sets. Patient has worsening confusion. CSF fluid analyses suggest pleocytosis. Cultures are no growth from the CSF. Chest CT was ordered this morning indicating a loculated right pleural effusion as well as bilateral septic emboli. Dr. Mack spoke with Dr. Oh yesterday and no surgical intervention unless clinical deterioration.  4/16: Increased respiratory distress.  Tachypneic.  CT with loculated collection.  Dr Resendiz not available.  No thoracic surgeon available.  Plans to have pulmonary place CT.  Transferring to ICU.  4/17: Transferred to [facility] last night. Hgb dropped to 6.4 requiring PRBC transfusion. Requiring 2 pressors. Fio2 50%. Febrile 38.8. Pending OR decortication of empyema and hemothorax. Chest tube placed at [facility] shows 8,371 WBC, ,000, 74% N  4/18: Chest tube was upsized by surgery yesterday, no decortication. WBC 22k. Fio2 40%. Vasopressin. Levophed down to 2mcg. Afebrile 24 hrs. Last fever 38.6 on 4/16.   4/19: Still on vent. Levo 3 mcg, vasopressin. Afebrile 72 hours. WBC 21k. BC 4/17 NGTD x 48 hours. Unable to do MRI brain given neurostimulator per RN.   4/20: Remaining afebrile. Hb 6.2 and undergoing transfusion today.WBC 24,100, 5% bands.1700 ml CT output. Copious bloody ET secretions. No pressors. Receiving dornase and TPA per CT. Right pleural effusion not large per CXR today.   4/21: WBC  31k. Bronchoscopy yesterday showing blood. Cultures sent. TPA on hold per RN due to arvind blood from chest tube requiring PRBC transfusion for hgb 6. Pending chest CT today. Per , spinal stimulator is non-MRI compatible. Levophed 10mcg. 30% Fio2. Afebrile.   4/22: Fever 101.3 this am. WBC 20,300 down from 32,200 yesterday. BAL growing Klebsiella pneumoniae but susceptibility panel not set up until today. CTA of brain shows no lesion. CT of chest shows enlarging cavitary lung lesions bilat and large loculated new left pleural effusion and large hydropneumothorax on right. TPA & dornase infusions of right chest ended 4/20 after considerable bleeding requiring transfusion. Hb now down again to 6.8.  4/23: WBC 23k. Fio2 40%. Tmax 38.1. US of stiumlator shows small fluid collection but no drainable abscess. Pending MICAH today. Pending L chest tube placement  4/24: Continue fever 100.8-101.8. WBC 16,600. MICAH shows large vegetations on both tricuspid valve and mitral valve with mild TR & MR.  No aortic root abscess. Left chest tube placed yesterday yielding 8 ml of old bloody fluid. Bronch today revealed copious thick maroon secretions in distal trachea and both mainstem bronchi. On the right these were obstructive. Remaining off pressors. Last positive blood cultures (MSSA) were 4/16, negative 4/17.  4/25: Fever 100.6 this AM. wBC 13,300. Hb 6.6. CT: right hydropneumothorax increasing, right bronchopleural fistula, mediastinal shift ot the left , multiple cavitary pulmonary nodules, 3.1 cm left basilar mass, splenomegaly. CT of head shows dural thickening over the clivus. Lac+ GNR >100,000 col/ml growing from BAL of 4/24, presumed Klebsiella. Left peural fluid culture negative from 4/23.  4/26: Fever 100.4 last night. WBC 13,500. Hb 7.4. Plts 502,000. ESR >120. UA fairly clear except 30 mg% protein, 2-5 wbc and rare rbc. CXR unchanged except more prominent pulmonary edema. GNR in BAL may be a different species of  Klebsiella, and resistant to ampicillin & tmp-sulfa; confirmation pending.  4/28: Fever 100.8 last night. WBC 27,700. Hb 6.6. Plts 482,000. Creat 0.19. Kleb pneum in BAL on 4/24 is resistant to ampicillin & tmp-sulfa but otherwise sensitive. O2 sat 96% on FIO2 30% now, PEEP 8. Has been re-evaluated by thoracic surgeon, Dr. Ganser, who believes she will not wean without decortication on the right. Surgery anticipated today.  4/29: S/P right thoracoscopy with decortication with cultures NG at 24 hrs.  4/30: Had a bronch due to hypoxia and hypotension. CXR with worsening right hemithorax pulmonary opacities. Bloody mucous plugs removed. Pressors started. Febrile this am. Tachy in 130s. Pressors just removed. Tolerating vent, but not a SBT candidate at the moment.  5/1: Small air leak CT-2 every ~ 2/3 breathes. The K. pneumoniae from her thoracoscopy is sensitive to her cefepime so no antibiotic change needed.   5/2: No air leak today she tolerated 2  hrs of spontaneous breathing with support today.      5/3: Second day with SBT and doing well now for 2 hrs. Slight bump in temperature and      WBC's but no obvious clinical infectious changed so watch closely.      5/4: Fever still from time to time. WBCs trending up. Chest tubes in place. Trach.       5/5: She clearly is better today she was sitting up in bed with open eyes and follows commands. She still has low grade fever but her WBC's are dropping. Cefepime dose increased yesterday for increased CNS coverage if necessary. She will probably need further thoracic surgery as mentioned by Dr. Ganser. The issue of what to do with her stimulator is still up in the air for now as it probably will need to be removed but she is nort a candidate for more major surgery at this time.       5/7: She continues to improve and under go longer SBT trials. She just had a new PICC placed in her right arm and plan is to D/C central line.        5/8: PICC placed. No fevers. Comfortable.  Cortrak placed. Failing SBTs.  5/9: No acute issues. No fevers. Comfortable. Family at bedside.   5/10: No acute issues, fevers or WBC bumps. No changes in condition. She is to get her CT scan today and be reviewed by surgery  5/11:  at bedside.  Has been referred to LANE.  Repeat CT scan completed.  Results noted.  ? If Dr Ganser has seen.  Still with an empyema:  ? If candidate for further surgery.  5/12: Pending next thoracic surgery. No fever. Anxious.  5/13: ? Surgery date.  No one seems to know.  Had speaking valve in and having swallow eval with speech therapy  5/14: No acute issues. No fevers. Surgery Monday.  5/15: One CT accidentally pulled out yesterday.  Has been on T piece and tolerating. Plans for surgery Monday afternoon.  5/16: On schedule for surgery 5/17: 4:30 pm.  Moved to room T614.  No new issues.  5/17: Was sitting up in the bedside chair since change of shift.  Now walking in hallways with RN and CNA.  5/18: Decortication yesterday with 2 chest tube placement. WBC improving 21->14k  5/19: Hypotension and tachycardia.  Decreased H/H.  Placed back on vent to rest.  Dr Lozano in process of placing new line.  Antibiotics were deesculated yesterday to Cefazolin.  No cx were taken at surgery.  5/20: Pt was febrile yest afternoon 38.2, but now afebrile overnight after meropenem. WBC improved 11k->8k. Fio2 30%. Phenylephrine now off this morning. CXR shows new R consolidations.   5/21: Off pressors.  On vent: FiO2 30%, PEEP 8, PS 5 Received pRBCs yesterday.  5/22: Remains afebrile, off pressors. FiO2 30%.   5/23: Afebrile. FiO2 30%. Bronchoscopy yesterday normal.   5/24: No new cultures obtained at last bronchoscopy.  On  T piece this am: FiO2 30%.  5/25: Remaining afebrile. WBC 8600.     Review of Systems  Review of Systems   Unable to perform ROS: Intubated   Constitutional: Positive for malaise/fatigue.   Respiratory: Negative for cough.    Cardiovascular: Positive for chest pain (at CT  site.).   Gastrointestinal: Positive for nausea.   Neurological: Positive for headaches.      Physical Exam   Physical Exam  Constitutional:       Comments: Purple and pink hair dye. Tearful and difficult to understand.    HENT:      Head: Atraumatic.   Cardiovascular:      Rate and Rhythm: Normal rate and regular rhythm.      Pulses: Normal pulses.      Heart sounds: No murmur heard.     Pulmonary:      Comments: CT x two in place.  No air leak noted.  Serous material in tubing. Breathless speech. Diminished BS at bases.  Abdominal:      General: Abdomen is flat.      Palpations: Abdomen is soft.   Skin:     General: Skin is warm.   Neurological:      General: No focal deficit present.      Mental Status: She is alert.   Psychiatric:      Comments: Tearful.        Laboratory  Recent Labs     05/23/21 0425 05/24/21 0433 05/25/21  0116   WBC 6.8 7.5 8.6   RBC 2.86* 3.06* 3.22*   HEMOGLOBIN 8.2* 8.8* 9.2*   HEMATOCRIT 26.5* 28.7* 29.9*   MCV 92.7 93.8 92.9   MCH 28.7 28.8 28.6   MCHC 30.9* 30.7* 30.8*   RDW 58.7* 59.7* 59.2*   PLATELETCT 377 410 426   MPV 9.4 9.3 9.2     Recent Labs     05/23/21 0425 05/24/21  0433 05/25/21  0116   SODIUM 136 139 136   POTASSIUM 3.7 4.0 3.8   CHLORIDE 104 103 102   CO2 26 30 27   GLUCOSE 89 118* 96   BUN 13 14 11   CREATININE <0.17* 0.18* <0.17*   CALCIUM 8.0* 8.7 8.5         Impression   -Severe sepsis  -Catheter related bloodstream infection due to infected port status post removal 4/12-staph aureus  -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       - Acute left empyema s/p chest tube placement 4/23, decortication 5/17 - Klebsiella  -Multiple acute septic pulmonary emboli bilaterally  -Acute bilateral pneumonia due to above     --Pulmonary edema  -Pseudotumor cerebri with underlying  shunt: possible arachnoiditis  -Chronic migraine headaches  -History of  autoimmune vasculitis  -Elevated alkaline phosphatase & bilirubin  -Acute encephalopathy due to sepsis      - anemia due to above          - acute fever on 5/19 likely from secondary VAP          - Secondary VAP R sided pneumonia          - decubitus ulcer sacral.      Plan   Tracheal aspirate recently 5/19 no growth, blood cultures 5/19 no growth so far. CXR shows worsening R sided infiltrates concerning for secondary infections after reviewing her CXR's between 5/18 and  5/19.  Repeated sputum cultures still reflecting her original Klebsiella empyema infection. She improved post operatively with defervescence and pressor weaning while on meropenem. Suggestive of either time, or      an anaerobic infection that we weren't covering. Continue meropenem for 7 to 8 days, then switch back to cefazolin or Unasyn to complete therapy for endocarditis.     - Continue the Meropenem for  for secondary VAP for 7 days. Target date 5/26.  - Afterwards, de-escalate back to either cefazolin or Unasyn for her Klebsiella empyema and MSSA endocarditis treatment 4 weeks post recent decortication. Target date 6/14.   - Will need repeat Chest CT right before completion of antibiotics  - after completion of IV abx  Will need suppression therapy afterward for her HW (spinal stimulator,  shunt  targeting MSSA with Keflex, for example).      Will continue Meropenem for now.  Make certain she continues to improve before considering attempt to de-escalate again.  Vent management per pulmonary.  Now on T piece this am.  Monitor chest tube output but Dr. Vasquez expects to d/c it tomorrow.  No new recommendations at this time.      Discussed with RN.  35 min spent in critical care management without overlap coordinating care/counseling.

## 2021-05-25 NOTE — THERAPY
Speech Language Pathology  Daily Treatment     Patient Name: Enedelia Pang  Age:  50 y.o., Sex:  female  Medical Record #: [id number]  Today's Date: 5/25/2021     Precautions  Precautions: (P) Fall Risk, Nasogastric Tube, Tracheostomy , Chest Tube  Comments: chest tubes to suction    Assessment    Patient seen on this date for continued blue dye swallow evaluation.  Upon entering the room, the patient had the speaking valve in place and was sleeping.  She was easily aroused with verbal and tactile cues.  Provided education to risks of sleeping with the speaking valve on.  Patient verbalized understanding.  Patient producing strong, clear voice, adequate at the conversational level.  She maintained SpO2 at 96% on 5L O2/30% FiO2, -115.  Presentation of PO included ice chips x10, cup sips of thickened liquids x2 oz, and 5 teaspoons of puree.  The patient presented with timely initiation of swallow trigger and adequate laryngeal elevation upon palpation.  She had coughing x1 with puree but no overt s/sx of aspiration with any other consistency consumed.  Speaking valve removed and tracheal suctioning completed with minimal thin clear secretions removed. Speaking valve replaced in line with the T-Piece.  Recommend the patient remain NPO with tube feeding.  Please monitor tracheal secretions over the next 24 hours and document the presence of blue. She would benefit from a FEES to further assess pharyngeal swallowing function, which is planned for tomorrow.      Plan    Continue current treatment plan.    Discharge Recommendations: (P) Recommend post-acute placement for additional speech therapy services prior to discharge home    Subjective    Pleasant and agreeable.      Objective       05/25/21 1153   Dysphagia    Positioning / Behavior Modification Self Monitoring;Modulate Rate or Bite Size   Diet / Liquid Recommendation NPO;Pre-Feeding Trials with SLP Only   Nutritional Liquid Intake Rating Scale Nothing by  "mouth   Nutritional Food Intake Rating Scale Nothing by mouth   Nursing Communication Swallow Precaution Sign Posted at Head of Bed   Skilled Intervention Verbal Cueing   Recommended Route of Medication Administration   Medication Administration  Via Gastric Tube   Patient / Family Goals   Patient / Family Goal #1 \"I can breathe better\"   Goal #1 Outcome Progressing as expected   Short Term Goals   Short Term Goal # 1 The patient will tolerate the speaking valve while awake with no s/sx of emotional or respiratory distress   Goal Outcome # 1 Progressing as expected   Short Term Goal # 2 Pt will consume prefeeding trials with SLP only, given no overt s/sx of aspiration.    Goal Outcome # 2  Progressing as expected         "

## 2021-05-26 ENCOUNTER — APPOINTMENT (OUTPATIENT)
Dept: RADIOLOGY | Facility: MEDICAL CENTER | Age: 51
DRG: 003 | End: 2021-05-26
Attending: INTERNAL MEDICINE
Payer: MEDICARE

## 2021-05-26 LAB
ANION GAP SERPL CALC-SCNC: 7 MMOL/L (ref 7–16)
BASOPHILS # BLD AUTO: 0.7 % (ref 0–1.8)
BASOPHILS # BLD: 0.06 K/UL (ref 0–0.12)
BUN SERPL-MCNC: 14 MG/DL (ref 8–22)
CALCIUM SERPL-MCNC: 9 MG/DL (ref 8.5–10.5)
CHLORIDE SERPL-SCNC: 102 MMOL/L (ref 96–112)
CO2 SERPL-SCNC: 27 MMOL/L (ref 20–33)
CREAT SERPL-MCNC: <0.17 MG/DL (ref 0.5–1.4)
EOSINOPHIL # BLD AUTO: 0.02 K/UL (ref 0–0.51)
EOSINOPHIL NFR BLD: 0.2 % (ref 0–6.9)
ERYTHROCYTE [DISTWIDTH] IN BLOOD BY AUTOMATED COUNT: 58.5 FL (ref 35.9–50)
GLUCOSE SERPL-MCNC: 111 MG/DL (ref 65–99)
HCT VFR BLD AUTO: 31.6 % (ref 37–47)
HGB BLD-MCNC: 9.6 G/DL (ref 12–16)
IMM GRANULOCYTES # BLD AUTO: 0.05 K/UL (ref 0–0.11)
IMM GRANULOCYTES NFR BLD AUTO: 0.6 % (ref 0–0.9)
LYMPHOCYTES # BLD AUTO: 1.27 K/UL (ref 1–4.8)
LYMPHOCYTES NFR BLD: 14.5 % (ref 22–41)
MAGNESIUM SERPL-MCNC: 1.8 MG/DL (ref 1.5–2.5)
MCH RBC QN AUTO: 28.2 PG (ref 27–33)
MCHC RBC AUTO-ENTMCNC: 30.4 G/DL (ref 33.6–35)
MCV RBC AUTO: 92.9 FL (ref 81.4–97.8)
MONOCYTES # BLD AUTO: 0.79 K/UL (ref 0–0.85)
MONOCYTES NFR BLD AUTO: 9 % (ref 0–13.4)
NEUTROPHILS # BLD AUTO: 6.57 K/UL (ref 2–7.15)
NEUTROPHILS NFR BLD: 75 % (ref 44–72)
NRBC # BLD AUTO: 0 K/UL
NRBC BLD-RTO: 0 /100 WBC
PLATELET # BLD AUTO: 447 K/UL (ref 164–446)
PMV BLD AUTO: 9.3 FL (ref 9–12.9)
POTASSIUM SERPL-SCNC: 3.8 MMOL/L (ref 3.6–5.5)
RBC # BLD AUTO: 3.4 M/UL (ref 4.2–5.4)
SODIUM SERPL-SCNC: 136 MMOL/L (ref 135–145)
WBC # BLD AUTO: 8.8 K/UL (ref 4.8–10.8)

## 2021-05-26 PROCEDURE — 700111 HCHG RX REV CODE 636 W/ 250 OVERRIDE (IP): Performed by: INTERNAL MEDICINE

## 2021-05-26 PROCEDURE — 99233 SBSQ HOSP IP/OBS HIGH 50: CPT | Performed by: HOSPITALIST

## 2021-05-26 PROCEDURE — 71045 X-RAY EXAM CHEST 1 VIEW: CPT

## 2021-05-26 PROCEDURE — 700105 HCHG RX REV CODE 258: Performed by: INTERNAL MEDICINE

## 2021-05-26 PROCEDURE — 700102 HCHG RX REV CODE 250 W/ 637 OVERRIDE(OP): Performed by: INTERNAL MEDICINE

## 2021-05-26 PROCEDURE — 770020 HCHG ROOM/CARE - TELE (206)

## 2021-05-26 PROCEDURE — 97530 THERAPEUTIC ACTIVITIES: CPT

## 2021-05-26 PROCEDURE — 99233 SBSQ HOSP IP/OBS HIGH 50: CPT | Performed by: INTERNAL MEDICINE

## 2021-05-26 PROCEDURE — 700102 HCHG RX REV CODE 250 W/ 637 OVERRIDE(OP): Performed by: HOSPITALIST

## 2021-05-26 PROCEDURE — 85025 COMPLETE CBC W/AUTO DIFF WBC: CPT

## 2021-05-26 PROCEDURE — A9270 NON-COVERED ITEM OR SERVICE: HCPCS | Performed by: INTERNAL MEDICINE

## 2021-05-26 PROCEDURE — 83735 ASSAY OF MAGNESIUM: CPT

## 2021-05-26 PROCEDURE — 94640 AIRWAY INHALATION TREATMENT: CPT

## 2021-05-26 PROCEDURE — 80048 BASIC METABOLIC PNL TOTAL CA: CPT

## 2021-05-26 PROCEDURE — 97116 GAIT TRAINING THERAPY: CPT

## 2021-05-26 PROCEDURE — 92612 ENDOSCOPY SWALLOW (FEES) VID: CPT

## 2021-05-26 PROCEDURE — A9270 NON-COVERED ITEM OR SERVICE: HCPCS | Performed by: HOSPITALIST

## 2021-05-26 RX ORDER — MAGNESIUM SULFATE HEPTAHYDRATE 40 MG/ML
2 INJECTION, SOLUTION INTRAVENOUS ONCE
Status: COMPLETED | OUTPATIENT
Start: 2021-05-26 | End: 2021-05-26

## 2021-05-26 RX ORDER — DIVALPROEX SODIUM 125 MG/1
500 CAPSULE, COATED PELLETS ORAL EVERY 8 HOURS
Status: DISCONTINUED | OUTPATIENT
Start: 2021-05-26 | End: 2021-05-28

## 2021-05-26 RX ADMIN — DIVALPROEX SODIUM 500 MG: 125 CAPSULE ORAL at 17:35

## 2021-05-26 RX ADMIN — DIAZEPAM 5 MG: 5 TABLET ORAL at 22:19

## 2021-05-26 RX ADMIN — GABAPENTIN 300 MG: 300 CAPSULE ORAL at 22:19

## 2021-05-26 RX ADMIN — PAROXETINE HYDROCHLORIDE 10 MG: 20 TABLET, FILM COATED ORAL at 05:49

## 2021-05-26 RX ADMIN — MIDODRINE HYDROCHLORIDE 5 MG: 5 TABLET ORAL at 05:49

## 2021-05-26 RX ADMIN — GABAPENTIN 300 MG: 300 CAPSULE ORAL at 05:49

## 2021-05-26 RX ADMIN — CEFAZOLIN SODIUM 2 G: 2 INJECTION, SOLUTION INTRAVENOUS at 14:29

## 2021-05-26 RX ADMIN — RISPERIDONE 2 MG: 1 TABLET, FILM COATED ORAL at 05:48

## 2021-05-26 RX ADMIN — MIDODRINE HYDROCHLORIDE 5 MG: 5 TABLET ORAL at 22:19

## 2021-05-26 RX ADMIN — OXYCODONE 5 MG: 5 TABLET ORAL at 17:35

## 2021-05-26 RX ADMIN — METHADONE HYDROCHLORIDE 30 MG: 10 TABLET ORAL at 05:48

## 2021-05-26 RX ADMIN — MIDODRINE HYDROCHLORIDE 5 MG: 5 TABLET ORAL at 14:28

## 2021-05-26 RX ADMIN — MEROPENEM 1 G: 1 INJECTION, POWDER, FOR SOLUTION INTRAVENOUS at 05:48

## 2021-05-26 RX ADMIN — ACETAMINOPHEN 500 MG: 325 TABLET, FILM COATED ORAL at 10:53

## 2021-05-26 RX ADMIN — POTASSIUM BICARBONATE 25 MEQ: 978 TABLET, EFFERVESCENT ORAL at 10:53

## 2021-05-26 RX ADMIN — DULOXETINE HYDROCHLORIDE 60 MG: 60 CAPSULE, DELAYED RELEASE ORAL at 17:35

## 2021-05-26 RX ADMIN — RISPERIDONE 2 MG: 1 TABLET, FILM COATED ORAL at 17:35

## 2021-05-26 RX ADMIN — GABAPENTIN 300 MG: 300 CAPSULE ORAL at 14:28

## 2021-05-26 RX ADMIN — CEFAZOLIN SODIUM 2 G: 2 INJECTION, SOLUTION INTRAVENOUS at 22:21

## 2021-05-26 RX ADMIN — HYDROMORPHONE HYDROCHLORIDE 2 MG: 1 INJECTION, SOLUTION INTRAMUSCULAR; INTRAVENOUS; SUBCUTANEOUS at 06:16

## 2021-05-26 RX ADMIN — ACETAMINOPHEN 500 MG: 325 TABLET, FILM COATED ORAL at 21:31

## 2021-05-26 RX ADMIN — HYDROMORPHONE HYDROCHLORIDE 2 MG: 1 INJECTION, SOLUTION INTRAMUSCULAR; INTRAVENOUS; SUBCUTANEOUS at 02:09

## 2021-05-26 RX ADMIN — ENOXAPARIN SODIUM 40 MG: 40 INJECTION SUBCUTANEOUS at 05:48

## 2021-05-26 RX ADMIN — MAGNESIUM SULFATE 2 G: 2 INJECTION INTRAVENOUS at 10:53

## 2021-05-26 RX ADMIN — LEVETIRACETAM 500 MG: 500 TABLET ORAL at 05:48

## 2021-05-26 RX ADMIN — DULOXETINE HYDROCHLORIDE 60 MG: 60 CAPSULE, DELAYED RELEASE ORAL at 05:48

## 2021-05-26 RX ADMIN — ACETAMINOPHEN 500 MG: 325 TABLET, FILM COATED ORAL at 14:29

## 2021-05-26 ASSESSMENT — PAIN DESCRIPTION - PAIN TYPE
TYPE: ACUTE PAIN

## 2021-05-26 ASSESSMENT — GAIT ASSESSMENTS
ASSISTIVE DEVICE: FRONT WHEEL WALKER
DISTANCE (FEET): 250
DEVIATION: OTHER (COMMENT)
GAIT LEVEL OF ASSIST: SUPERVISED

## 2021-05-26 ASSESSMENT — COGNITIVE AND FUNCTIONAL STATUS - GENERAL
TURNING FROM BACK TO SIDE WHILE IN FLAT BAD: A LITTLE
SUGGESTED CMS G CODE MODIFIER MOBILITY: CK
MOBILITY SCORE: 18
STANDING UP FROM CHAIR USING ARMS: A LITTLE
WALKING IN HOSPITAL ROOM: A LITTLE
MOVING TO AND FROM BED TO CHAIR: A LITTLE
MOVING FROM LYING ON BACK TO SITTING ON SIDE OF FLAT BED: A LITTLE
CLIMB 3 TO 5 STEPS WITH RAILING: A LITTLE

## 2021-05-26 NOTE — THERAPY
Speech Language Pathology   Fiberoptic Endoscopic Evaluation of Swallow     Patient Name: Enedelia Pang  AGE:  50 y.o., SEX:  female  Medical Record #: [id number]  Today's Date: 5/26/2021     Precautions  Precautions: (P) Fall Risk, Nasogastric Tube, Tracheostomy   Comments: (P) Chest tubes pulled 5/26    Assessment    Patient seen for a FEES on this date.  Discussed with the patient the risks, benefits, and alternatives of the FEES procedure. Patient acknowledges and agreeable to proceed with the procedure and tolerated well.  Assessment completed with speaking valve in place and patient up to a chair.  Upon insertion of the scope, vocal folds appeared symmetrical and complete vocal fold adduction was achieved during cough and breath hold.  Presentation of PO included ice chips, mildly thick liquids, puree, soft solids, mixed consistencies, and thin liquids.  The patient presented with mild pharyngeal dysphagia as seen by reduced sensation, and weak tongue base retraction.  Patient with premature spillage to the valleculae and pyriform sinuses with thin liquids and to the valleculae with all other consistencies consumed.  She had mild vallecular residue with all consistencies consumed and trace residue in the pyriform sinuses with purees.  Regular textures and soft solids pooled in the valleculae but cleared with a liquid wash.  With mixed consistencies, juice brimmed the laryngeal vestibule but did not breach the aryepiglottic folds.  The patient required cues to elicit a volitional swallow to clear.  Penetration was suspected during the swallow with LARGE serial cup sips of thins as seen by diffuse blue residue in the laryngeal vestibule.  Unable to r/o aspiration during the swallow due to glistening on the tracheostomy tube which was suspicious for thin liquids. No penetration or aspiration with any other consistency consumed.  Recommend initiation of a Soft & Bite-Size diet with Mildly Thick Liquids with STRICT  swallowing strategies (small bites/sips, 2 swallows every 2-3 bites, liquid wash every 2-3 bites).  Patient will require feeding assistance, given weakness and painful right shoulder. Okay to discontinue 1:1 feeding as the patient is able to feed herself independently.  SPEAKING VALVE TO BE ON WITH ALL PO INTAKE, INCLUDING MEDS. Patient to be up to a chair with all meals.Please do not pull the Cortrak until the patient is consistently consuming greater than 75% of her meals over 2-3 meals.  Please hold the tube feeding if okay with the dietitian.  SLP following       Plan    Recommend Speech Therapy 3 times per week until therapy goals are met for the following treatments:  Dysphagia Training, Expression Training and Patient / Family / Caregiver Education.    Discharge Recommendations: (P) Recommend post-acute placement for additional speech therapy services prior to discharge home    Subjective    Pleasant and agreeable     Objective       05/26/21 1050   FEES Evaluation Prior To Procedure   Type of Airway Standard Cuffed Trach   Onset Date Of Dysphagia 05/11/21   Dysphagia Symptoms Warranting Video Swallow trached, weak   Procedure Performed While Patient Sitting In Bedside Chair   Seated at (Degrees) 90   A Flexible Endoscope Was Introduced Transnasally In The Right Nare   FEES  Anatomy Assessment   Anatomy For A Functional Swallow: Other (Comments)  (Typical)   FEES Laryngeal Adduction Assessment   Breath Holding Adequate   Clearing Throat Adequate   Cough Adequate   Phonation Adequate   Onset Of Swallow Adequate   FEES Velopharyngeal Assessment    Velopharyngeal Closure: Adequate   FEES Voice Assessment    Voice: Other (Comments)  (breathy, functional)   FEES Sensation Assessment    Presence of Scope Adequate   Presence of Residue Adequate   Presence of Penetration Absent Response   Presence of Aspiration   (suspected silent aspiration with thins)   FEES Swallow Function Assessment   Swallow Trigger  Impaired;Level of the Valleculae;Level of the Pyriform Sinuses   Base of Tongue Retraction Impaired   Pharyngeal Constrictor Movement Functional   White Out Phase Complete White Out   Epiglottic Movement Functional   Cricopharyngeal Function Functional   Swallow Observations / Symptoms   Swallow Observations / Symptoms Yes   Vallecular Residue Mildly Thick (2) - (Nectar Thick);Thin (0);Pureed (4);Soft & Bite-Sized (6) - (Dysphagia III);Regular (7)   Mildly Thick (2) - (Nectar Thick) Cup;Straw   Thin (0) Cup   Pyriform Sinus Residue Pureed (4)   Penetration Suspected During the Swallow Thin (0)   Thin (0) Cup   Aspiration Suspected During the Swallow Thin (0)   Thin (0) Cup   Compensatory Strategies Attempted   Compensatory Strategies Attempted Yes   Multiple Swallows Cleared vallecular residue    Controlled Bolus Size No penetration with single sips of thins   Liquid Wash After Swallow Cleared vallecular residue   Patient Ability To Protect Airway   Patient Ability To Protect Airway  Adequate   Penetration Aspiration Scale   Penetration Aspiration Scale 5 - Material contacts vocal folds and is not ejected, visible stasis remains   [place] Pharyngeal Residue Severity   Vallecular Residue Mild, epiglottic ligament visable   Pyriform Sinus Residue  Trace, trace coating   Dysphagia Rating   Nutritional Liquid Intake Rating Scale Thickened beverages (mildly thick unless otherwise specified)   Nutritional Food Intake Rating Scale Total oral diet with multiple consistencies without special preparation but with specific food limitations   Problem List   Problem List Dysphagia;Voice Quality Deficit   Evaluation Recommendations   Diet / Liquid Recommendation Soft & Bite-Sized (6) - (Dysphagia III);Mildly Thick (2) - (Nectar Thick)   Medication Administration  Float Whole with Puree   Evaluation Recommended Techniques   Swallow Techniques Yes   Techniques Pharyngeal Phase Repeat Swallow 2-3 Times On Each Bolus To Clear  "Residue;Alternate Liquids / Solids Consistency To Wash Foods Through Pharynx   Patient / Family Goals   Patient / Family Goal #1 \"I can breathe better\"   Short Term Goals   Short Term Goal # 2 Pt will consume prefeeding trials with SLP only, given no overt s/sx of aspiration.    Goal Outcome # 2  Goal met, new goal aded   Short Term Goal # 2 B  Pt will consume SB6/MT2 diet with no overt s/sx of aspiration, given min cues to swallowing strategies.          "

## 2021-05-26 NOTE — PROGRESS NOTES
"Critical Care Progress Note    Date of admission  4/16/2021    Chief Complaint  \"50 y.o. female the past medical history of pseudotumor cerebri status post  shunt by Dr. Oh, chronic pain with history of placement of nerve stimulator, vasculitis, right anterior chest port for home IV meds with recurrent infection who presented 4/11/2021 with to [facility] with recurrent port infection. Found to have MSSA bacteremia, endocarditis, septic pulmonary emboli/empyema/pneumatocele, and CSF pleocytosis concerning for  shunt involvement.  Seen by Dr. Oh, NSGY, at [facility] who did not recommend  shunt removal.  Seen by Dr. Vargas here for concern of fluid collection around her spinal stimulator and he recommended against removal. Remains intubated, encephalopathic.\"  5/17:Critical care signing back on tonight after total right lung decortication for organized empyema  She was recovered in PACU, and is now on T-piece.  Follow-up ABG showed mild respiratory acidosis  She has 2 right chest tubes with marked improvement in aeration of right lung postoperatively, no pneumothorax on CXR  Minimize sedation, dexmedetomidine tonight if needed, pulmonary hygiene, follow-up arterial blood gas, positive airway pressure if needed     Hospital Course  4/16 admitted to ICU  4/17 VD 2, 2 FFP, pRBC overnight; new chest tube per gen surg  4/18 VD 3, TPa/Dornase with 1400 cc from R chest tube  4/26 R chest tube not functioning, d/c it.  broke his leg and going to OR today, so not available currently.  VD 11.  8/30%. RSBI immediately high on SBT attempt. Followed commands for RN  4/27 VD12. 8/30%. Not tolerating wean, high RSBI and tachycardic.   4/28 VD 13. VATS with Dr. Ganser.    4/29 VD 14. 8/30%. Still not tolerating wean d/t severe tachycardia just with SAT. Trialed precedex and she required fentanyl up to 600/hr to tolerate, so back on propofol. Chest tubes -40  4/30 VD 15. 8/30%. Try ketamine/versed as sedative. Severe " tachycardia and HTN with weaning down propofol. Plan for perc trach tomorrow. Chest tubes -40  5/1 perc trach. PICC placed. Chest tubes remain -40.  5/2 Able to spont well on sedation, but very tachy/HTN when sedation is turned down.  5/3 midazolam weaned down to 3mg/min  5/4 off versed and ketamine gtt  5/5 trials of SBT w/ trach  5/6 T piece trial during day, mobilize to chair x 2, rest vent 8/8 at night, started methadone, gabapentin   5/7 T piece during day with, rest PS 8/8 overnight, spacing out dilaudid PRNs  5/8 T-piece during day, still with anxiety weaning dex gtt  5/10 CT chest  5/11 will require thoracotomy and decortication  5/12 off ventilator x 24 hours  5/13 replete magnesium, remains on T-piece, ambulating, transfer to med/surg  5/17 -right thoracotomy with total lung decortication, remains on T-piece postoperatively did well  5/18 on T piece but with increase tachycardia 2l IV fluids, increase pain regime, lethargic just asking for dilaudid  5/19 am shock, bolused fluid and gave blood, place on vent and checked multiple labs, stress dose steroids, inc antibiotic coverage re-cultured, ill appearing, was on pressor and febrile  5/20 on vent to keep right lung open, 1 prbc, low dose pressor occaisional  5/21 on vent sponting well, 1 prbc given, off pressor/started midodrine, klebsiella on meropenem, CTx2 with minimal output, wound vac  5/22 On vent, s/p bronch with no significant findings, low dose dex gtt  5/23 On vent overnight, Tpiece trials darted in a.m., merrem  5/24 off vent in a.m. 5/24, Tpiece trials, Merrem, LTACH consult placed  5/25 off vent for 24 hours, well-tolerated, ambulated in hallway    Overnight events/rouding:  Reviewed last 24 hour events:    A&O x4  Follows  Pain an issue but better with CTs out per [facility] today-Dr. Vasquez  SR/ST 90-110s  SBp 110-150s  UO adequate  Tpiece 5/30%, off vent > 48 hrs - WOB low  Secretions better, small  Strong cough  CXR improving  Merrem -> Ancef  "per ID  Keppra -> VPA per pharmacy, Merrem effects VPA clearance      D/c IV dilaudid  Transfer to telemetry  LTACH transfer pending, accepted but waiting on insurance clearance      FEES performed by speech, passed swallow eval and modified diet to start, cannot tolerate thins.  Observed part of the procedure, vocal cords and epiglottis look normal with excellent approximation and function       YESTERDAY  Awake and writing notes  Follows  SR/ST 80-110s  SBp 130-150s  UO adequate  Tpiece all night - \"some\" SOB/fatigue - looks ok  30%, WOB ok, RR 20s, sats 96%  CTs x2 on R - 20cc from each tube last 24hrs  CXR with improvements in right-sided pleural process, no PTX  Merrem 6/7 -> Ancef per ID  WBC 8.6  Tm 97.8  Last BC neg, last trach asp Kleb p    D/c IV Narcs, cont Methadone/Oxycodone  Mobilize  LTACH eval pending  Involve Hospitaist  Replete K/Mg    Serial follow-up  Patient ready for FEES per speech, plan for tomorrow  LOC much better in the afternoon off IV narcotics  Patient sat up in a chair for an extended period of time and did well per RN  Reviewed indications for LTACH transfer at length with patient who agrees  Reviewed her plethora of infection issues/complications  Encouraged the patient to work with us to transition off high-dose narcotics  Increase mobility encouraged, patient walked in hallway with therapy today  Patient repeatedly requested IV narcotics until chest tubes removed, thoracic surgery plans on moving them tomorrow placed on waterseal today, x-ray in a.m.    Will continue every 4 as needed Dilaudid until chest tubes pulled tomorrow by Dr. Vasquez    Family at bedside    Review of Systems  Review of Systems   Unable to perform ROS: Intubated   Tracheostomy    Vital Signs for last 24 hours   Temp:  [36.4 °C (97.6 °F)-36.6 °C (97.8 °F)] 36.5 °C (97.7 °F)  Pulse:  [] 99  Resp:  [12-35] 18  BP: ()/(46-75) 118/63  SpO2:  [93 %-99 %] 97 %    Hemodynamic parameters for last 24 " hours       Respiratory Information for the last 24 hours       Physical Exam   Physical Exam  Vitals and nursing note reviewed.   Constitutional:       General: She is not in acute distress.     Appearance: She is ill-appearing (Looks better on lower amount of narcotics). She is not toxic-appearing or diaphoretic.      Comments: Laying in bed chronic ill appearing improved color   HENT:      Head: Normocephalic and atraumatic.      Comments: Purple hair     Nose: Nose normal.      Comments: cortrack     Mouth/Throat:      Mouth: Mucous membranes are moist.      Pharynx: Oropharynx is clear.   Eyes:      General: No scleral icterus.     Pupils: Pupils are equal, round, and reactive to light.   Neck:      Comments: Tracheostomy in place on vent  Cardiovascular:      Rate and Rhythm: Regular rhythm. Tachycardia present.      Pulses: Normal pulses.      Heart sounds: Normal heart sounds. No murmur heard.   No gallop.    Pulmonary:      Effort: Pulmonary effort is normal.      Breath sounds: Rhonchi (Improved) present. No wheezing.      Comments: Chest tubes removed 5/26    Abdominal:      General: There is no distension.      Palpations: Abdomen is soft. There is no mass.      Tenderness: There is no abdominal tenderness. There is no right CVA tenderness, left CVA tenderness, guarding or rebound.      Hernia: No hernia is present.   Musculoskeletal:         General: No swelling or tenderness. Normal range of motion.      Cervical back: Normal range of motion and neck supple.      Right lower leg: No edema.      Left lower leg: No edema.   Skin:     General: Skin is warm and dry.      Capillary Refill: Capillary refill takes less than 2 seconds.      Coloration: Skin is not jaundiced.      Findings: No bruising or erythema.   Neurological:      General: No focal deficit present.      Mental Status: She is lethargic.      GCS: GCS eye subscore is 4. GCS verbal subscore is 1. GCS motor subscore is 6.      Comments: Alert,  follows commands, moves all 4 extremities, mouths words, writing on board, CN intact, up in chair   Psychiatric:         Mood and Affect: Mood is not anxious.         Behavior: Behavior is not agitated. Behavior is cooperative.         Medications  Current Facility-Administered Medications   Medication Dose Route Frequency Provider Last Rate Last Admin   • divalproex (DEPAKOTE SPRINKLE) capsule 500 mg  500 mg Enteral Tube Q8HRS Man Wahl M.D.       • ceFAZolin in dextrose (ANCEF) IVPB premix 2 g  2 g Intravenous Q8HRS F John Rees M.D.   Stopped at 05/26/21 1459   • enoxaparin (LOVENOX) inj 40 mg  40 mg Subcutaneous DAILY Juventino Lozano M.D.   40 mg at 05/26/21 0548   • midodrine (PROAMATINE) tablet 5 mg  5 mg Enteral Tube Q8HRS Juventino Lozano M.D.   5 mg at 05/26/21 1428   • Respiratory Therapy Consult   Nebulization Continuous RT Juventino Lozano M.D.       • MD Alert...ICU Electrolyte Replacement per Pharmacy   Other PHARMACY TO DOSE Juventino Lozano M.D.       • gabapentin (NEURONTIN) capsule 300 mg  300 mg Enteral Tube Q8HRS Juventino Lozano M.D.   300 mg at 05/26/21 1428   • methadone (DOLOPHINE) tablet 30 mg  30 mg Enteral Tube DAILY Juventino Lozano M.D.   30 mg at 05/26/21 0548   • diazePAM (VALIUM) tablet 5 mg  5 mg Enteral Tube QHS PRN Raphael Armstrong M.D.   5 mg at 05/25/21 0306   • ondansetron (ZOFRAN) syringe/vial injection 4 mg  4 mg Intravenous Q4HRS PRN Juventino Lozano M.D.   4 mg at 05/13/21 0431   • acetaminophen (TYLENOL) tablet 500 mg  500 mg Enteral Tube TID Juventino Lozano M.D.   500 mg at 05/26/21 1429   • oxyCODONE immediate-release (ROXICODONE) tablet 5 mg  5 mg Enteral Tube Q4HRS PRN Juventino Lozano M.D.   5 mg at 05/25/21 1406   • labetalol (NORMODYNE/TRANDATE) injection 10 mg  10 mg Intravenous Q4HRS PRN Juventino Lozano M.D.   10 mg at 05/04/21 0226   • Pharmacy Consult: Enteral tube insertion - review meds/change route/product selection  1 Each Other  PHARMACY TO DOSE Juventino Lozano M.D.       • acetaminophen (Tylenol) tablet 650 mg  650 mg Enteral Tube Q4HRS PRN Juventino Lozano M.D.   650 mg at 05/12/21 1233   • magnesium hydroxide (MILK OF MAGNESIA) suspension 30 mL  30 mL Enteral Tube QDAY PRN Juventino Lozano M.D.        And   • senna-docusate (PERICOLACE or SENOKOT S) 8.6-50 MG per tablet 2 tablet  2 tablet Enteral Tube BID Juventino Lozano M.D.   2 tablet at 05/25/21 0521    And   • polyethylene glycol/lytes (MIRALAX) PACKET 1 Packet  1 Packet Enteral Tube QDAY PRN Juventino Lozano M.D.   1 Packet at 05/20/21 0505    And   • bisacodyl (DULCOLAX) suppository 10 mg  10 mg Rectal QDAY PRN Juventino Lozano M.D.       • DULoxetine (CYMBALTA) capsule 60 mg  60 mg Enteral Tube BID Elliott Salvador M.D.   60 mg at 05/26/21 0548   • PARoxetine (PAXIL) tablet 10 mg  10 mg Enteral Tube QAM Elliott Salvador M.D.   10 mg at 05/26/21 0549   • risperiDONE (RISPERDAL) tablet 2 mg  2 mg Enteral Tube BID Elliott Salvador M.D.   2 mg at 05/26/21 0548   • ipratropium-albuterol (DUONEB) nebulizer solution  3 mL Nebulization Q2HRS PRN (RT) Juventino Lozano M.D.           Fluids    Intake/Output Summary (Last 24 hours) at 5/26/2021 1603  Last data filed at 5/26/2021 0800  Gross per 24 hour   Intake 720 ml   Output 200 ml   Net 520 ml       Laboratory          Recent Labs     05/24/21  0433 05/25/21  0116 05/26/21  0220   SODIUM 139 136 136   POTASSIUM 4.0 3.8 3.8   CHLORIDE 103 102 102   CO2 30 27 27   BUN 14 11 14   CREATININE 0.18* <0.17* <0.17*   MAGNESIUM 1.8 1.8 1.8   CALCIUM 8.7 8.5 9.0     Recent Labs     05/24/21 0433 05/24/21  1500 05/25/21  0116 05/26/21 0220   ALTSGPT 35  --   --   --    ASTSGOT 27  --   --   --    ALKPHOSPHAT 602*  --   --   --    TBILIRUBIN 0.4  --   --   --    PREALBUMIN  --  23.5  --   --    GLUCOSE 118*  --  96 111*     Recent Labs     05/24/21 0433 05/25/21  0116 05/26/21 0220   WBC 7.5 8.6 8.8   NEUTSPOLYS 70.90 71.40 75.00*    LYMPHOCYTES 17.80* 17.30* 14.50*   MONOCYTES 9.90 9.80 9.00   EOSINOPHILS 0.30 0.40 0.20   BASOPHILS 0.30 0.50 0.70   ASTSGOT 27  --   --    ALTSGPT 35  --   --    ALKPHOSPHAT 602*  --   --    TBILIRUBIN 0.4  --   --      Recent Labs     05/24/21  0433 05/25/21  0116 05/26/21  0220   RBC 3.06* 3.22* 3.40*   HEMOGLOBIN 8.8* 9.2* 9.6*   HEMATOCRIT 28.7* 29.9* 31.6*   PLATELETCT 410 426 447*       Imaging  X-Ray:  I have personally reviewed the images and compared with prior images.  EKG:  I have personally reviewed the images and compared with prior images.  CT:    Reviewed  Echo:   Reviewed    Assessment/Plan  * Empyema of right pleural space (HCC)- (present on admission)  Assessment & Plan  R empyema due to septic pulmonary emboli  4/16 Chest tube placement at [facility]  4/18 Upsized by Dr Benedict  4/18-4/20 Received TPA/dornase.  Stopped b/c Hgb dropped requiring transfusion  4/26 was discontinued due to stopped functioning   4/28 s/p right VATS and decortication. Cx grew of Klebsiella pneumonia  5/17 total right lung decortication for organized empyema, right chest tubes x2  Output for several days has been 0 to 20 cc  Chest x-ray improving  Chest tubes x2 removed 5/26 by Dr. Vasquez  Follow-up chest x-ray as needed    Shock (HCC)  Assessment & Plan  Undifferentiated acute am of 5/19 - improved/resolved  Fluid bolus, PRBC blood cultures, sputum, ID follow with broadening antibiotics  Lactate normal  Hg 6.5 initially, will serial monitor hg, No coagulopathy  emperic rx with stress dose steroids and cortisol ok  Passive leg raise unremarkable     Debility  Assessment & Plan  PT/OT/SLP eval    Elevated alkaline phosphatase level- (present on admission)  Assessment & Plan  Unchanged --> recheck tomorrow  With dilated biliary ducts on imaging, stable  Unable to get MRCP due to nerve stimulator, ?  Eventual ERCP    Goals of care, counseling/discussion  Assessment & Plan  Palliative consulted  Full code    Spinal cord  stimulator status  Assessment & Plan  Patient's stimulator is [employer]. It is FDA approved as MRI compatible, but the company has gone out of business, so there is no support team to assist with MRI.  Thus, if MRI were performed, our radiologists would need to protocol it correctly to manage the stimulator. The former rep from [employer] is Andre, [phone].  Information obtained from Dr. Mahoney's office, [phone].    Per  (5/4/2021), it is not MRI compatible unfortunately.     Anasarca- (present on admission)  Assessment & Plan  Secondary to chronic critical illness  Optimize nutrition and volume status    Pleural effusion, left  Assessment & Plan  Parapneumonic fluid from abscesses  CT guided chest tube placed 4/23  TPA/dornase one dose on 4/24.  Hold d/t dropping Hgb  CT chest 4/25 with resolution of fluid  CT clamped 4/29-no significant accumulation of fluid so d/c 4/30  Monitor for now radiographically-chest x-ray is improving        Atelectasis  Assessment & Plan  Encourage mobility  Aggressive pulmonary toilet  Encourage coughing every-2 hours while awake  Suction as needed through tracheostomy  Follow-up x-ray as needed  Chest tubes in place for effusion/empyema, transfer removed 5/26  Chest x-ray improving    Pneumonia  Assessment & Plan  MSSA septic emboli with empyema   BAL 4/20 klebsiella pneumonia  BAL 4/24 still heavy growth of klebsiella  4/28 OR pleural fluid specimen growing klebsiella.   Continue antibiotics per ID -> meropenem for 7 days and then back to Ancef, stop day mid June  Worsening infiltrates and sputum on right lung place on ventilator -> benign on bronchoscopy for pna 5/22  ABX per ID    Bacteremia due to methicillin susceptible Staphylococcus aureus (MSSA)- (present on admission)  Assessment & Plan  Source presumed right anterior chest line/port with endocarditis  Multiple sources for MSSA including MV/TV vegetation, port, spinal stimulator/ shunt  Status post port removal  at [facility]  Last positive blood cultures were 4/17  Status post treatment with nafcillin from 4/16-4/23  ID following, continue meropenem 7days then back to Ancef 5/26 as per ID    Acute bacterial endocarditis- (present on admission)  Assessment & Plan  MSSA bacteremia at OSH. First negative blood cultures were on 4/17  Infected port removed at [facility]   shunt and spinal stimulator could be seeded, NSGY has consulted on both and don't recommend removal at admission due to HD instability.   MV and TV vegetations with septic pulmonary emboli  Nataly ID following, antibiotics escalated to cefepime->Merrem for VAP and Klebsiella on prior pleural fluid  Back to Ancef 5/26 per ID, antibiotics to complete 6/14?  Follow-up cultures from decortication 5/17 -> none taken    Prolonged Q-T interval on ECG- (present on admission)  Assessment & Plan  Continue to optimize electrolytes  Avoid QT prolonging medications as able  Repeat EKG as needed  Cardiac monitoring    Acute respiratory failure with hypoxia (HCC)- (present on admission)  Assessment & Plan  4/15 Intubated for acute hypoxic respiratory failure at [facility] due to hydro/pneumo, trapped lung, lung abscesses. 5/1 s/p percutaneous tracheostomy  Place back on ventilator for peep and recruitment and rest with her acute shock 5/19 gas exchange good  5/21 SBT trials monitor for de-recruitment on CXR  Bronchoscopy 5/22 was benign  T piece trials 5/23, off the ventilator since  PSMV trials ongoing, voice better  Chest tubes placed to waterseal 5/25  Follow-up chest x-ray as needed   mobilize      Tachycardia  Assessment & Plan  Persistent, sinus tach, now starting to see occasional sinus rhythm in the 90s  Treat underlying causes    Chronic pain syndrome- (present on admission)  Assessment & Plan  Chronic back pain and intractable headaches  History of nerve stimulator   Dr Vargas consulted at admission, no plan to remove stimulator due to her clinical instability  Multimodal with  scheduled oxycodone, gabapentin, methadone (increased dose 5/11)  Hopefully reduce some of her sedating agents her chest tubes removed and she becomes more mobile  Patient lobbying hard for IV narcotics, will continue them at low-dose through chest tube removal 5/26, tubes pulled and IV Dilaudid discontinued midday 5/26  On methadone with breakthrough enteral oxycodone  Monitor for the need for pain specialist consultation short-term and long-term    Port or reservoir infection  Assessment & Plan  Current venous access port infection R ant-chest, likely initial infection triggering entire admission to [facility]/Transfer to [facility] post removal again of port 4/12 by Dr. Candelaria  MSSA positive cultures with right-sided endocarditis and septic emboli to lung complicated by right-sided empyema    Anemia- (present on admission)  Assessment & Plan  Daily CBC with conservative transfusion strategy  5/19 transfusion 1 prbc, 5/20 1 prbc, 5/21 1 prbc  Continue to monitor for bleeding in right lung CT's are minimal  Restart vte prophy monitor closely for bleeding 5/23    Sepsis (HCC)  Assessment & Plan        Hypomagnesemia  Assessment & Plan  Magnesium 1.8  Replete to keep > 2.0    Hypokalemia  Assessment & Plan  Keep > 4.0    Anxiety- (present on admission)  Assessment & Plan  Improving  Continue Depakote, paxil, cymbalta, gabapentin, as clinically improves transition off some of these agents?  Limited as needed benzodiazepines    S/P  shunt- (present on admission)  Assessment & Plan  History of pseudotumor cerebri  History of  shunt by Dr. Oh  Could be seeded by MSSA, Dr. Oh consulted at [facility], recommended MRI but unable given her spinal stimulator    Acute encephalopathy- (present on admission)  Assessment & Plan  Reorient and maintain sleep/wake cycle, mobilize  Limit sedative when able, attempting to reduce narcotic burden, patient requesting more narcotics regularly  Discontinuing IV narcotics  Attempt to  slowly wean some of her other oral sedating agents as pain and anxiety allow  Difficult to treat anxiety and acute/chronic pain  Improved overall    I have performed a physical exam and reviewed and updated ROS and Plan today (5/26/2021). In review of yesterday's note (5/25/2021), there are no changes except as documented above.     Discussed patient condition and risk of morbidity and/or mortality with Hospitalist, RN, RT, Pharmacy, Charge nurse / hot rounds, Patient and Speech therapy.      Case discussed with Dr. Chi Marley, patient clinically improving, off the ventilator for 48 hours, will transfer to telemetry, RCC will sign off, consult pulmonary as needed for management of respiratory status and tracheostomy, patient hopefully will transfer to LTACH in the next few days?

## 2021-05-26 NOTE — CARE PLAN
The patient is Watcher - Medium risk of patient condition declining or worsening    Shift Goals  Clinical Goals: increase mobility  Patient Goals: pain control    Progress made toward(s) clinical / shift goals:  Can turn self side to side     Problem: Mobility  Goal: Patient's capacity to carry out activities will improve  Outcome: Progressing  Note: Able to fully turn self side to side and offload sacrum       Patient is not progressing towards the following goals:      Problem: Pain Management  Goal: Pain level will decrease to patient's comfort goal  5/26/2021 0319 by Debbie Power, R.N.  Outcome: Not Progressing  Note: Pt continually c/o 8/10 pain  5/26/2021 0318 by Debbie Power, R.N.  Outcome: Not Progressing

## 2021-05-26 NOTE — PROGRESS NOTES
Hospital Medicine Daily Progress Note    Date of Service  5/26/2021    Chief Complaint  50 y.o. female admitted 4/16/2021 with recurrent infections    Hospital Course   This is a 50-year-old female with a past medical history of pseudotumor cerebri s/p  shunt implantation by Dr. Oh neurosurgery, chronic pain syndrome with a history of placement of a nerve stimulator, vasculitis, right anterior chest port for home IV medication administration, recurrent infections related to port, presented on 4/11 to [facility] with recurrent port infection was initially treated with vancomycin and Zosyn and then changed to ceftaroline subsequently switched to nafcillin, MSSA identified.  [facility] infectious disease is managing antibiotics for the patient.  Dr. Candelaria surgery removed the port on 4/12.  The patient was further identified to have endocarditis of the tricuspid valve, a lumbar puncture performed on 4/14 showed pleocytosis with negative Gram stain.  The patient suffered worsening leukocytosis and was found to have septic emboli per CT.  Initially a small pleural catheter was placed.  Significant loculations were encountered.  MSSA empyema was diagnosed.  Neurosurgery was consulted to comment on possibly removal of a  shunt, this was felt not appropriate at this time.  The patient remained on mechanical ventilation for 15 days, then a perc trach was placed.  The patient was eventually liberated from mechanical ventilation, the patient did have a right thoracoscopy and decortication of the lung performed on 4/28 by Dr. Ganser.  The patient was of identified to have multiple sources for MSSA including mitral valve, tricuspid valve vegetation, port, spinal stimulator,  shunt possibly.  Patient underwent repeat chest surgery, decortication with 2 chest tube placement on 5/18, had to be placed back on mechanical ventilation after surgery, central access was replaced on 5/19, the patient treated  in ICU with eventual chest tube removal with assistance of thoracic surgery and was deescalated in terms of respiratory support down to trach collar.  Enedelia in need of extensive rehabilitation, question of LTAC level    Interval Problem Update  Patient seen and examined today.    Patient tolerating treatment and therapies.  All Data, Medication data reviewed.  Case discussed with nursing as available.  Plan of Care reviewed with patient and notified of changes.  5/26 the patient on T-piece, 30% FiO2, no increase in work of breathing, small amount of secretions, good cough, chest x-ray improving, chest surgeon removed both chest tubes, ongoing antibiotic treatment with assistance of [facility] infectious disease.  Transfer options being evaluated.  Afebrile, tachycardic with heart rate in the 1 teens, blood pressure is soft but maintaining MAP.  Laboratory data reviewed, electrolytes are balanced, no leukocytosis, improving hemoglobin, chest x-ray with stable lines and tubes, stable ill-defined bilateral pulmonary opacities, no large effusions    Consultants/Specialty  ID  Thoracic surgery  Pulmonary critical care  Code Status  Full Code    Disposition  Once CT's are out will probably go to LTACH    Review of Systems  Review of Systems   Unable to perform ROS: Other        Physical Exam  Temp:  [36 °C (96.8 °F)-36.6 °C (97.8 °F)] 36.5 °C (97.7 °F)  Pulse:  [] 116  Resp:  [12-35] 20  BP: ()/(46-93) 118/63  SpO2:  [93 %-99 %] 95 %    Physical Exam  Vitals reviewed.   Constitutional:       General: She is not in acute distress.     Appearance: She is well-developed. She is ill-appearing. She is not diaphoretic.   HENT:      Head: Normocephalic and atraumatic.   Neck:      Vascular: No JVD.   Cardiovascular:      Rate and Rhythm: Regular rhythm. Tachycardia present.   Pulmonary:      Effort: Pulmonary effort is normal. No respiratory distress.      Breath sounds: No stridor. Rhonchi and rales present. No wheezing.       Comments: Trach without inflammation  Abdominal:      Palpations: Abdomen is soft.      Tenderness: There is no abdominal tenderness. There is no guarding or rebound.   Skin:     General: Skin is warm and dry.      Coloration: Skin is pale.      Findings: No rash.   Neurological:      Mental Status: She is alert and oriented to person, place, and time.      Motor: Weakness present.   Psychiatric:         Thought Content: Thought content normal.         Fluids    Intake/Output Summary (Last 24 hours) at 5/26/2021 1226  Last data filed at 5/26/2021 0800  Gross per 24 hour   Intake 900 ml   Output 200 ml   Net 700 ml       Laboratory  Recent Labs     05/24/21  0433 05/25/21  0116 05/26/21  0220   WBC 7.5 8.6 8.8   RBC 3.06* 3.22* 3.40*   HEMOGLOBIN 8.8* 9.2* 9.6*   HEMATOCRIT 28.7* 29.9* 31.6*   MCV 93.8 92.9 92.9   MCH 28.8 28.6 28.2   MCHC 30.7* 30.8* 30.4*   RDW 59.7* 59.2* 58.5*   PLATELETCT 410 426 447*   MPV 9.3 9.2 9.3     Recent Labs     05/24/21  0433 05/25/21  0116 05/26/21  0220   SODIUM 139 136 136   POTASSIUM 4.0 3.8 3.8   CHLORIDE 103 102 102   CO2 30 27 27   GLUCOSE 118* 96 111*   BUN 14 11 14   CREATININE 0.18* <0.17* <0.17*   CALCIUM 8.7 8.5 9.0                   Imaging  DX-CHEST-PORTABLE (1 VIEW)   Final Result         1. Stable lines and tubes.   2. Stable ill-defined bilateral pulmonary opacities, right worse than left. No large pleural effusions.         DX-CHEST-PORTABLE (1 VIEW)   Final Result      1.  Decrease in volume of right pleural fluid collection/empyema with 2 right chest tubes in place. No pneumothorax identified.      2.  No focal consolidation in the left lung.      DX-CHEST-PORTABLE (1 VIEW)   Final Result      No significant interval change.      DX-CHEST-PORTABLE (1 VIEW)   Final Result         1.  Hazy right pulmonary infiltrates and/or effusion, stable compared to prior study.      DX-CHEST-PORTABLE (1 VIEW)   Final Result         1.  Hazy right pulmonary infiltrates and/or  effusion, stable compared to prior study.      DX-CHEST-PORTABLE (1 VIEW)   Final Result      1.  All lines and tubes appear appropriately located      2.  Consolidation throughout the right lung consistent with pneumonia      DX-CHEST-PORTABLE (1 VIEW)   Final Result         1.  Hazy right pulmonary infiltrates and/or effusion, increased compared to prior study.      DX-ABDOMEN FOR TUBE PLACEMENT   Final Result      Feeding tube looped in the stomach.      DX-CHEST-PORTABLE (1 VIEW)   Final Result         1. Stable lines and tubes. No pneumothorax detected.   2. Persistent opacities in the right perihilar region and in the right lung periphery, similar to prior study. Left lung is essentially clear.      DX-CHEST-PORTABLE (1 VIEW)   Final Result      Postoperative changes of the right chest with placement of an additional chest tube. There is improved aeration in the right lung. No significant pleural effusion or definite pneumothorax.      Right IJ central venous catheter tip projects over the proximal right atrium.      Hypoinflation with interstitial and hazy pulmonary opacities.      DX-CHEST-PORTABLE (1 VIEW)   Final Result         1.  Hazy right pulmonary infiltrates and/or effusion, similar compared to prior study.      DX-ABDOMEN FOR TUBE PLACEMENT   Final Result      Feeding tube tip projects over the expected location the gastric antrum.      DX-CHEST-PORTABLE (1 VIEW)   Final Result         1.  Hazy right pulmonary infiltrates and/or effusion, similar compared to prior study.      DX-CHEST-LIMITED (1 VIEW)   Final Result      1.  No pneumothorax. Only one chest tube is now seen in the right lung.   2.  The chest is otherwise stable.      DX-CHEST-PORTABLE (1 VIEW)   Final Result         1.  Hazy right pulmonary infiltrates and/or effusion, similar compared to prior study.      DX-CHEST-PORTABLE (1 VIEW)   Final Result         1.  Hazy right pulmonary infiltrates and/or effusion, similar compared to prior  study.      DX-ABDOMEN FOR TUBE PLACEMENT   Final Result         1.  Nonspecific bowel gas pattern.   2.  Dobbhoff tube tip terminates overlying the expected location of the gastric antrum.   3.  Hazy bilateral lung base infiltrates, greater on the right.      DX-CHEST-PORTABLE (1 VIEW)   Final Result         1.  Hazy right pulmonary infiltrates and/or effusion, similar compared to prior study.      US-RUQ   Final Result         1.  Hepatomegaly   2.  Mild intrahepatic and extrahepatic biliary ductal dilatation, commonly associated with postcholecystectomy physiology, consider causes of biliary obstruction with additional workup as clinically appropriate      CT-CHEST,ABDOMEN,PELVIS WITH   Final Result      1.  Large empyema in the RIGHT hemithorax, slightly decreased in size from prior exam with chest tubes present.   2.  Consolidation remains.   3.  Multiple cavitary lesions again seen in the LEFT upper lobe, minimally decreased from prior, concerning for septic emboli.   4.  Supportive tubing is unchanged.   5.  Intrahepatic and extrahepatic biliary dilation, likely postoperative although distal stricture, stone or mass remain possibilities.   6.  Moderate pelvic fluid, nonspecific.   7.  No evidence of bowel obstruction or perforation.      DX-CHEST-PORTABLE (1 VIEW)   Final Result         1.  Hazy right pulmonary infiltrates and/or effusion, similar compared to prior study.      DX-CHEST-PORTABLE (1 VIEW)   Final Result         No significant interval change.            DX-CHEST-PORTABLE (1 VIEW)   Final Result         Interval removal of left central venous catheter. Interval adjustment of right PICC with tip in the SVC.         DX-ABDOMEN FOR TUBE PLACEMENT   Final Result      Feeding tube tip at the mid to distal stomach.      IR-PICC LINE PLACEMENT W/ GUIDANCE > AGE 5   Final Result                  Ultrasound-guided PICC placement performed by qualified nursing staff as    above.          DX-CHEST-PORTABLE  (1 VIEW)   Final Result      1.  Stable cardiopulmonary findings.   2.  See comments above regarding the right-sided PICC position.      DX-CHEST-PORTABLE (1 VIEW)   Final Result      Stable chest findings compared to 5/5.      DX-CHEST-PORTABLE (1 VIEW)   Final Result      Stable chest findings compared with 5/3.      DX-ABDOMEN FOR TUBE PLACEMENT   Final Result         Feeding tube with tip projecting over the expected area of the stomach.      DX-CHEST-PORTABLE (1 VIEW)   Final Result      Stable chest findings compared with prior.      DX-CHEST-PORTABLE (1 VIEW)   Final Result         1. No significant interval change.      US-EXTREMITY ARTERY LOWER UNILAT RIGHT   Final Result      DX-CHEST-PORTABLE (1 VIEW)   Final Result         1. No significant interval change.      IR-PICC LINE PLACEMENT W/ GUIDANCE > AGE 5   Final Result                  Ultrasound-guided PICC placement performed by qualified nursing staff as    above.          DX-CHEST-PORTABLE (1 VIEW)   Final Result         1.  Pulmonary edema and/or infiltrates, similar to prior study.   2.  Stable opacification right lung, likely effusion. Thoracostomy tubes are in place.   3.  Multiple cavitary appearing left pulmonary lesions, see CT performed April 27, 2021 for further characterization      DX-CHEST-PORTABLE (1 VIEW)   Final Result         1.  Pulmonary edema and/or infiltrates, similar to prior study.   2.  Single opacification right lung, likely effusion. Thoracostomy tubes are in place.   3.  Multiple cavitary appearing left pulmonary lesions, see CT performed April 27, 2021 for further characterization   4.  Soft tissue gas in the right chest wall      DX-CHEST-PORTABLE (1 VIEW)   Final Result         1.  Pulmonary edema and/or infiltrates, similar to prior study.   2.  Increased opacification right lung, likely increased effusion. Thoracostomy tubes are in place.   3.  Multiple cavitary appearing left pulmonary lesions, see CT performed April  27, 2021 for further characterization   4.  Soft tissue gas in the right chest wall      DX-CHEST-PORTABLE (1 VIEW)   Final Result         1.  Pulmonary edema and/or infiltrates, similar to prior study.   2.  Right pneumothorax, stable compared to prior study, right thoracostomy tubes remain in place   3.  Multiple cavitary appearing left pulmonary lesions, see CT performed April 27, 2021 for further characterization   4.  Soft tissue gas in the right chest wall      DX-CHEST-PORTABLE (1 VIEW)   Final Result         1.  Pulmonary edema and/or infiltrates, similar to prior study.   2.  Right pneumothorax, interval decreased compared to prior study, interval placement of right thoracostomy tubes is noted.   3.  Multiple cavitary appearing left pulmonary lesions, see CT performed yesterday for further characterization   4.  Soft tissue gas in the right chest wall      DX-CHEST-PORTABLE (1 VIEW)   Final Result         1.  Pulmonary edema and/or infiltrates, similar to prior study.   2.  Right pneumothorax, stable compared to prior study, interval removal of right thoracostomy tube is noted.   3.  Multiple cavitary appearing left pulmonary lesions, see CT performed yesterday for further characterization   4.  Soft tissue gas in the right chest wall      DX-CHEST-LIMITED (1 VIEW)   Final Result         No significant interval change.      CT-CTA CHEST PULMONARY ARTERY W/ RECONS   Final Result         No CT evidence of pulmonary embolus.      Previous right chest tube were removed.      Grossly similar in size of the loculated right hydropneumothorax but there is increased layering fluid component. Unchanged mild shift of the mediastinal to the left.      Redemonstration of a pigtail catheter in the medial left lung base. Grossly similar multiple cavitary lesions in the left lung.      US-EXTREMITY VENOUS LOWER BILAT   Final Result      POCT BUTTERFLY-DUPLEX SCAN EXTREMITY VEINS LIMITED   Final Result       DX-CHEST-PORTABLE (1 VIEW)   Final Result         1.  Pulmonary edema and/or infiltrates, similar to prior study.   2.  Right pneumothorax, somewhat decreased to prior study, interval removal of right thoracostomy tube is noted.   3.  Multiple cavitary appearing left pulmonary lesions, see CT performed yesterday for further characterization   4.  Soft tissue gas in the right chest wall      DX-CHEST-PORTABLE (1 VIEW)   Final Result         1.  Pulmonary edema and/or infiltrates, similar to prior study.   2.  Right pneumothorax, similar to prior study with thoracostomy tube in place.   3.  Multiple cavitary appearing left pulmonary lesions, see CT performed yesterday for further characterization      CT-CHEST (THORAX) W/O   Final Result      Large loculated right hydropneumothorax with interval increase in size of the pneumothorax and pleural fluid.      Right chest tube is in the posterior right thorax.      There is mediastinal shift to the left compatible with tension.      Small pericardial effusion.      Small loculated left pleural effusion with overlying atelectasis/consolidation.   Pigtail catheter is seen at the medial left lung base. Pleural effusion has decreased in size compared to prior.      There are multiple foci of air extending from the right lung to the pleural space suspicious for a bronchopleural fistula.      Multiple cavitary lesions bilaterally with mild interval decrease of the solid component of the cavitary nodules.      Noncavitary 3.1 cm masslike density is seen at the left lung base.      Findings may be related to septic emboli, malignancy or multifocal abscesses. Short interval follow-up recommended.      Soft tissue air on the right is again seen.      Splenomegaly      Findings discussed with Leti Sun.      DX-CHEST-PORTABLE (1 VIEW)   Final Result      No significant change from prior exam.      CT-HEAD W/O   Final Result      1.  RIGHT frontal ventriculostomy catheter  unchanged in position.   2.  Mild cortical atrophy.   3.  No intracranial hemorrhage.   4.  Apparent dural thickening overlying the clivus.  Consider further evaluation with contrast-enhanced MRI.      DX-CHEST-LIMITED (1 VIEW)   Final Result      Interval left basilar pigtail catheter with diminished atelectasis, pleural fluid      Stable right hydropneumothorax with thoracostomy tube in place, considerable soft tissue gas and partial lung collapse      IR-CHEST TUBE-EMPYEMA LEFT   Final Result      1.  CT GUIDED LEFT  10 [employer] PIGTAIL CHEST TUBE PLACEMENT FOR POSSIBLE EMPYEMA YIELDING OLD BLOODY FLUID.      2. A CHEST RADIOGRAPH IS FORTHCOMING.      EC-MICAH W/O CONT   Final Result      US-ABDOMEN LTD (SOFT TISSUE)   Final Result      No fluid seen surrounding the electronic implanted spinal stimulator device.      DX-CHEST-LIMITED (1 VIEW)   Final Result      1.  Large right hydropneumothorax with right-sided chest tube in place, likely stable to slightly decreased from prior study.   2.  Small left pleural effusion. Mild improved aeration in the left lung.   3.  Bilateral pulmonary opacities which could be related to combination of atelectasis, edema and/or infection..      CT-CTA HEAD WITH & W/O-POST PROCESS   Final Result      1.  Patent carotid and vertebral arteries.      2.  Again seen right frontal the jugular peritoneal shunt catheter. There is mild increase in volume of the lateral and third ventricles.      CT-CHEST (THORAX) W/O   Final Result      1.  Interval placement of a right-sided chest tube into a large loculated right-sided pleural effusion. There is now seen a large right-sided hydropneumothorax in the area that previously seen fluid. The chest tube extends into that hydropneumothorax.    There is some residual pleural fluid seen as well. A hydropneumothorax is somewhat loculated with components most prominently inferiorly and medially.      2.  New loculated left pleural effusion.      3.   Again seen multiple bilateral cavitary pulmonary masses which are more prominent than previously seen with increasing cavitation and measure up to 3 cm size. Differential diagnosis includes septic emboli, multifocal abscesses and neoplasm.      4.  Large amount of subcutaneous emphysema on the right.      5.  Splenomegaly.      6.  Multiple support devices.      Fleischner Society pulmonary nodule recommendations:         DX-CHEST-LIMITED (1 VIEW)   Final Result      1.  Support devices as described above.      2.  Right chest tube present with a again seen right-sided pneumothorax, possibly increased in size and loculated.      3.  Worsening bilateral pulmonary infiltrates.      DX-ABDOMEN FOR TUBE PLACEMENT   Final Result      Cortrak feeding tube tip projects in the region of the duodenal bulb.      DX-CHEST-LIMITED (1 VIEW)   Final Result      Loculated right pneumothorax is decreased in size compared to prior.      Small left pleural effusion.      Small loculated right pleural effusion.      Extensive bilateral airspace opacities are not significantly changed.      Soft tissue air on the right is again noted.      DX-CHEST-LIMITED (1 VIEW)   Final Result      Decreased partially loculated right pleural fluid with increased pleural air. Right chest tube is in place.      Increased hazy opacity in the left lung possibly atelectasis.         US-ABDOMEN LTD (SOFT TISSUE)   Final Result      No drainable fluid collection.      DX-CHEST-PORTABLE (1 VIEW)   Final Result      Decreased partially loculated right pleural fluid with increased pleural air. Right chest tube is in place.      No other significant change.      DX-CHEST-PORTABLE (1 VIEW)   Final Result         1.  Pulmonary edema and/or infiltrates are identified, which are somewhat decreased since the prior exam.   2.  Moderate loculated appearing right pleural effusion, slightly decreased since prior      DX-ABDOMEN FOR TUBE PLACEMENT   Final Result       Feeding tube tip at the distal stomach.      DX-CHEST-PORTABLE (1 VIEW)   Final Result         1.  New chest tube is noted in the right lower hemithorax.      2.  Right pleural effusion is again identified which appears similar to the prior exam.      3.  No new infiltrates or consolidations are identified.      DX-CHEST-PORTABLE (1 VIEW)   Final Result         1.  Pulmonary edema and/or infiltrates are identified, which are stable since the prior exam.   2.  Moderate loculated appearing right pleural effusion      DX-CHEST-PORTABLE (1 VIEW)   Final Result         No significant interval change.      POCT BUTTERFLY-ECHOCARDIOGRAM LTD W/O CONT    (Results Pending)        Assessment/Plan  * Empyema of right pleural space (HCC)- (present on admission)  Assessment & Plan  Patient s/p multiple chest tubes, thoracotomy and decortication  Now s/p second decortication 5/17      Debility  Assessment & Plan  Acute on chronic, the patient likely need of LTAC treatment post acute treatment    Elevated alkaline phosphatase level- (present on admission)  Assessment & Plan  The patient not able to tolerate a MRCP at this time, ultrasound grossly unremarkable, observe    Agitation requiring sedation protocol- (present on admission)  Assessment & Plan  Monitor,    Goals of care, counseling/discussion  Assessment & Plan  Patient with overall high risk of morbidity and mortality given her gravely ill state and prior chronic medical conditions  Palliative care assisting    Spinal cord stimulator status  Assessment & Plan  Patient's stimulator is Nuvectra. It is FDA approved as MRI compatible, but the company has gone out of business, so there is no support team to assist with MRI.  Thus, if MRI were performed, our radiologists would need to protocol it correctly to manage the stimulator. The former rep from [employer] is Andre, [phone].  Information obtained from Dr. Mahoney's office, [phone].     Per  (5/4/2021), it is  not MRI compatible unfortunately.     Thrombocytosis (HCC)  Assessment & Plan  Acute reaction since resolved    Anasarca- (present on admission)  Assessment & Plan  Improved nutritional status, skin protective measures    Trapped lung  Assessment & Plan  Now s/p decortication   Chest tubes removed on 5/26, observe    Pulmonary abscess (HCC)  Assessment & Plan  From septic emboli  Now s/p decortication    Fever  Assessment & Plan  Resolved, monitor, antibiotic therapy    Atelectasis  Assessment & Plan  Pulmonary recruiting, respiratory care    Pneumonia  Assessment & Plan  MSSA  Ancef per ID    History of vasculitis- (present on admission)  Assessment & Plan  Does not appear to be a current issue    Bacteremia due to methicillin susceptible Staphylococcus aureus (MSSA)- (present on admission)  Assessment & Plan  Infectious disease assisting with antibiotic treatment  Multiple potential sources  Endocarditis  ID following    CSF pleocytosis- (present on admission)  Assessment & Plan  Infectious disease managing    Acute bacterial endocarditis- (present on admission)  Assessment & Plan  Mitral valve, tricuspid valve  MSSA  Ongoing antibiotic therapy for acute infection and 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    Acute blood loss anemia  Assessment & Plan  Likely secondary to hemothorax  300 cc of blood removed after chest tube [facility] at [facility]  Trend hemoglobin    Prolonged Q-T interval on ECG- (present on admission)  Assessment & Plan  Resolved after correcting metabolic issues  Cont to follow   Monitor electrolytes, acidosis etc    Acute respiratory failure with hypoxia (HCC)- (present on admission)  Assessment & Plan  Secondary to MSSA bacteremia, empyema, pulmonary septic emboli  S/p tracheostomy  Ongoing respiratory care, pulmonary toilet, antibiotic treatment  Prolonged ventilator dependence    History of complicated migraines- (present on admission)  Assessment & Plan  Monitor    GERD  (gastroesophageal reflux disease)- (present on admission)  Assessment & Plan  History of, as needed treatment    Hyponatremia  Assessment & Plan  Resolved  Follow daily    Tachycardia  Assessment & Plan  Persistent, likely secondary with multiple underlying ongoing conditions  Monitor    Pseudotumor cerebri  Assessment & Plan  History of  shunt, neurosurgery opted against removal    H/O: CVA (cerebrovascular accident)- (present on admission)  Assessment & Plan  Monitor    Chronic pain syndrome- (present on admission)  Assessment & Plan  Pre-existing, pain management    Port or reservoir infection  Assessment & Plan  S/p removal    Thrombocytopenia (HCC)- (present on admission)  Assessment & Plan  Transient, resolved    Anemia- (present on admission)  Assessment & Plan  Monitor, transfuse as indicated    Septic shock (HCC)- (present on admission)  Assessment & Plan  Multiple sources, recovered with aggressive treatment, IV antibiotics long-term    Sepsis (HCC)  Assessment & Plan  Multiple sources possible, recovered with medical treatment  Now on long term treatment of MSSA bacteremia   [facility] Infectious Disease following    Hypomagnesemia  Assessment & Plan  Monitor, replace and recheck    Hypokalemia  Assessment & Plan  Monitor, replace and recheck    Anxiety- (present on admission)  Assessment & Plan  Likely acute on chronic  Prn management  SSRI    S/P  shunt- (present on admission)  Assessment & Plan  History of, Dr. Oh felt there is currently no need to remove    Acute encephalopathy- (present on admission)  Assessment & Plan  Per neurosurgery no need to remove  shunt  Improving with ongoing medical treatment       Plan  Continue with antibiotic therapy  Multimodality pain and behavioral control  Rehabilitation, PT OT  Possible LTAC/[facility]  Monitor electrolytes closely and replace as indicated  Increase mobility  Pulmonary toilet  See orders  Medically complex high risk patient    VTE prophylaxis:  enoxaparin    I have performed a physical exam and reviewed and updated ROS and Plan today . In review of yesterday's note , there are no changes except as documented above.        Please note that this dictation was created using voice recognition software. I have made every reasonable attempt to correct obvious errors, but I expect that there are errors of grammar and possibly context that I did not discover before finalizing the note.

## 2021-05-26 NOTE — PROGRESS NOTES
Infectious Disease Daily Progress Note    Date of Service  5/26/2021    Chief Complaint  Meropenem 5/19-present     Previous ABX  Cefepime 4/23 5/18-  s/p 23 days  Cefazolin 5/18-5/19     Thoracoscopy & Decortication - 4/28  Decortication 5/17     PRIOR Rx:   Zosyn 4/22-4/23  Previous: Unasyn, Zosyn, Vanco, Daptomycin  Ceftaroline: start 4/13-4/15  Nafcillin 2g Q4 hrs 4/15-4/23 4/14: Unable to give name of previous NSG or neurologist. Seems confused, but able to say some sentences fluently.   4/15: Line cultures now growing MSSA. As well as from the blood. Repeat blood culture 4/13+ in both sets. Patient has worsening confusion. CSF fluid analyses suggest pleocytosis. Cultures are no growth from the CSF. Chest CT was ordered this morning indicating a loculated right pleural effusion as well as bilateral septic emboli. Dr. Mack spoke with Dr. Oh yesterday and no surgical intervention unless clinical deterioration.  4/16: Increased respiratory distress.  Tachypneic.  CT with loculated collection.  Dr Resendiz not available.  No thoracic surgeon available.  Plans to have pulmonary place CT.  Transferring to ICU.  4/17: Transferred to [facility] last night. Hgb dropped to 6.4 requiring PRBC transfusion. Requiring 2 pressors. Fio2 50%. Febrile 38.8. Pending OR decortication of empyema and hemothorax. Chest tube placed at [facility] shows 8,371 WBC, ,000, 74% N  4/18: Chest tube was upsized by surgery yesterday, no decortication. WBC 22k. Fio2 40%. Vasopressin. Levophed down to 2mcg. Afebrile 24 hrs. Last fever 38.6 on 4/16.   4/19: Still on vent. Levo 3 mcg, vasopressin. Afebrile 72 hours. WBC 21k. BC 4/17 NGTD x 48 hours. Unable to do MRI brain given neurostimulator per RN.   4/20: Remaining afebrile. Hb 6.2 and undergoing transfusion today.WBC 24,100, 5% bands.1700 ml CT output. Copious bloody ET secretions. No pressors. Receiving dornase and TPA per CT. Right pleural effusion not large per CXR today.   4/21: WBC  31k. Bronchoscopy yesterday showing blood. Cultures sent. TPA on hold per RN due to arvind blood from chest tube requiring PRBC transfusion for hgb 6. Pending chest CT today. Per , spinal stimulator is non-MRI compatible. Levophed 10mcg. 30% Fio2. Afebrile.   4/22: Fever 101.3 this am. WBC 20,300 down from 32,200 yesterday. BAL growing Klebsiella pneumoniae but susceptibility panel not set up until today. CTA of brain shows no lesion. CT of chest shows enlarging cavitary lung lesions bilat and large loculated new left pleural effusion and large hydropneumothorax on right. TPA & dornase infusions of right chest ended 4/20 after considerable bleeding requiring transfusion. Hb now down again to 6.8.  4/23: WBC 23k. Fio2 40%. Tmax 38.1. US of stiumlator shows small fluid collection but no drainable abscess. Pending MICAH today. Pending L chest tube placement  4/24: Continue fever 100.8-101.8. WBC 16,600. MICAH shows large vegetations on both tricuspid valve and mitral valve with mild TR & MR.  No aortic root abscess. Left chest tube placed yesterday yielding 8 ml of old bloody fluid. Bronch today revealed copious thick maroon secretions in distal trachea and both mainstem bronchi. On the right these were obstructive. Remaining off pressors. Last positive blood cultures (MSSA) were 4/16, negative 4/17.  4/25: Fever 100.6 this AM. wBC 13,300. Hb 6.6. CT: right hydropneumothorax increasing, right bronchopleural fistula, mediastinal shift ot the left , multiple cavitary pulmonary nodules, 3.1 cm left basilar mass, splenomegaly. CT of head shows dural thickening over the clivus. Lac+ GNR >100,000 col/ml growing from BAL of 4/24, presumed Klebsiella. Left peural fluid culture negative from 4/23.  4/26: Fever 100.4 last night. WBC 13,500. Hb 7.4. Plts 502,000. ESR >120. UA fairly clear except 30 mg% protein, 2-5 wbc and rare rbc. CXR unchanged except more prominent pulmonary edema. GNR in BAL may be a different species of  Klebsiella, and resistant to ampicillin & tmp-sulfa; confirmation pending.  4/28: Fever 100.8 last night. WBC 27,700. Hb 6.6. Plts 482,000. Creat 0.19. Kleb pneum in BAL on 4/24 is resistant to ampicillin & tmp-sulfa but otherwise sensitive. O2 sat 96% on FIO2 30% now, PEEP 8. Has been re-evaluated by thoracic surgeon, Dr. Ganser, who believes she will not wean without decortication on the right. Surgery anticipated today.  4/29: S/P right thoracoscopy with decortication with cultures NG at 24 hrs.  4/30: Had a bronch due to hypoxia and hypotension. CXR with worsening right hemithorax pulmonary opacities. Bloody mucous plugs removed. Pressors started. Febrile this am. Tachy in 130s. Pressors just removed. Tolerating vent, but not a SBT candidate at the moment.  5/1: Small air leak CT-2 every ~ 2/3 breathes. The K. pneumoniae from her thoracoscopy is sensitive to her cefepime so no antibiotic change needed.   5/2: No air leak today she tolerated 2  hrs of spontaneous breathing with support today.      5/3: Second day with SBT and doing well now for 2 hrs. Slight bump in temperature and      WBC's but no obvious clinical infectious changed so watch closely.      5/4: Fever still from time to time. WBCs trending up. Chest tubes in place. Trach.       5/5: She clearly is better today she was sitting up in bed with open eyes and follows commands. She still has low grade fever but her WBC's are dropping. Cefepime dose increased yesterday for increased CNS coverage if necessary. She will probably need further thoracic surgery as mentioned by Dr. Ganser. The issue of what to do with her stimulator is still up in the air for now as it probably will need to be removed but she is nort a candidate for more major surgery at this time.       5/7: She continues to improve and under go longer SBT trials. She just had a new PICC placed in her right arm and plan is to D/C central line.        5/8: PICC placed. No fevers. Comfortable.  Cortrak placed. Failing SBTs.  5/9: No acute issues. No fevers. Comfortable. Family at bedside.   5/10: No acute issues, fevers or WBC bumps. No changes in condition. She is to get her CT scan today and be reviewed by surgery  5/11:  at bedside.  Has been referred to LANE.  Repeat CT scan completed.  Results noted.  ? If Dr Ganser has seen.  Still with an empyema:  ? If candidate for further surgery.  5/12: Pending next thoracic surgery. No fever. Anxious.  5/13: ? Surgery date.  No one seems to know.  Had speaking valve in and having swallow eval with speech therapy  5/14: No acute issues. No fevers. Surgery Monday.  5/15: One CT accidentally pulled out yesterday.  Has been on T piece and tolerating. Plans for surgery Monday afternoon.  5/16: On schedule for surgery 5/17: 4:30 pm.  Moved to room T614.  No new issues.  5/17: Was sitting up in the bedside chair since change of shift.  Now walking in hallways with RN and CNA.  5/18: Decortication yesterday with 2 chest tube placement. WBC improving 21->14k  5/19: Hypotension and tachycardia.  Decreased H/H.  Placed back on vent to rest.  Dr Lozano in process of placing new line.  Antibiotics were deesculated yesterday to Cefazolin.  No cx were taken at surgery.  5/20: Pt was febrile yest afternoon 38.2, but now afebrile overnight after meropenem. WBC improved 11k->8k. Fio2 30%. Phenylephrine now off this morning. CXR shows new R consolidations.   5/21: Off pressors.  On vent: FiO2 30%, PEEP 8, PS 5 Received pRBCs yesterday.  5/22: Remains afebrile, off pressors. FiO2 30%.   5/23: Afebrile. FiO2 30%. Bronchoscopy yesterday normal.   5/24: No new cultures obtained at last bronchoscopy.  On  T piece this am: FiO2 30%.  5/25: Remaining afebrile. WBC 8600.   5/26: Chest tube removed today. Possible transfer to \[facility]\""    Review of Systems  ROS     Physical Exam  Temp:  [36 °C (96.8 °F)-36.6 °C (97.8 °F)] 36.5 °C (97.7 °F)  Pulse:  [] 123  Resp:  [12-35] 21  BP:  ()/(46-93) 118/63  SpO2:  [93 %-99 %] 93 %    Physical Exam  Constitutional:       Appearance: Normal appearance.   HENT:      Head: Normocephalic.      Comments: NG tube  Neck:      Comments: tracheostomy  Cardiovascular:      Rate and Rhythm: Normal rate and regular rhythm.   Pulmonary:      Comments: dimninished in R base  Abdominal:      General: Abdomen is flat. Bowel sounds are normal.   Neurological:      Mental Status: She is alert and oriented to person, place, and time.         Fluids    Intake/Output Summary (Last 24 hours) at 5/26/2021 1042  Last data filed at 5/26/2021 0800  Gross per 24 hour   Intake 1040 ml   Output 200 ml   Net 840 ml       Laboratory  Recent Labs     05/24/21 0433 05/25/21  0116 05/26/21  0220   WBC 7.5 8.6 8.8   RBC 3.06* 3.22* 3.40*   HEMOGLOBIN 8.8* 9.2* 9.6*   HEMATOCRIT 28.7* 29.9* 31.6*   MCV 93.8 92.9 92.9   MCH 28.8 28.6 28.2   MCHC 30.7* 30.8* 30.4*   RDW 59.7* 59.2* 58.5*   PLATELETCT 410 426 447*   MPV 9.3 9.2 9.3     Recent Labs     05/24/21 0433 05/25/21 0116 05/26/21  0220   SODIUM 139 136 136   POTASSIUM 4.0 3.8 3.8   CHLORIDE 103 102 102   CO2 30 27 27   GLUCOSE 118* 96 111*   BUN 14 11 14   CREATININE 0.18* <0.17* <0.17*   CALCIUM 8.7 8.5 9.0                   Impression   -Severe sepsis  -Catheter related bloodstream infection due to infected port status post removal 4/12-staph aureus  -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       - Acute left empyema s/p chest tube placement 4/23, decortication 5/17, chest tube removal 5/26 - Klebsiella  -Multiple acute septic pulmonary emboli bilaterally  -Acute bilateral pneumonia due to above     --Pulmonary edema  -Pseudotumor cerebri with underlying  shunt: possible arachnoiditis  -Chronic migraine headaches  -History of autoimmune vasculitis  -Elevated alkaline phosphatase & bilirubin  -Acute  encephalopathy due to sepsis      - anemia due to above          - acute fever on 5/19 likely from secondary VAP          - Secondary VAP R sided pneumonia          - decubitus ulcer sacral.      Plan   Tracheal aspirate recently 5/19 no growth, blood cultures 5/19 no growth so far. CXR shows worsening R sided infiltrates concerning for secondary infections after reviewing her CXR's between 5/18 and  5/19.  Repeated sputum cultures still reflecting her original Klebsiella empyema infection. She improved post operatively with defervescence and pressor weaning while on meropenem. Suggestive of either time, or      an anaerobic infection that we weren't covering. Continue meropenem for 7 to 8 days, then switch back to cefazolin or Unasyn to complete therapy for endocarditis.     - Continue the Meropenem for  for secondary VAP for 7 days. Target date 5/26.  - Afterwards, de-escalate back to either cefazolin or Unasyn for her Klebsiella empyema and MSSA endocarditis treatment 4 weeks post recent decortication. Target date 6/14.   - Will need repeat Chest CT right before completion of antibiotics  - after completion of IV abx  Will need suppression therapy afterward for her HW (spinal stimulator,  shunt  targeting MSSA with Keflex, for example).    - chest tube removed this morning     Dispo: Pt to transfer to [facility]. Micheal will follow patient there    30 min spent  Dr. Cox

## 2021-05-26 NOTE — CARE PLAN
Respiratory Update    Treatment modality: Aerosol T-piece 5L/30%  Frequency:Q4    Pt tolerating current treatments well with no adverse reactions.

## 2021-05-26 NOTE — PROGRESS NOTES
"Progress Note:  5/26/2021, 11:34 AM    S: No acute events.  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]      Priority: High   • Acute respiratory failure with hypoxia (HCC) [J96.01]      Priority: High   • Acute bacterial endocarditis [I33.0]      Priority: High   • Empyema of right pleural space (HCC) [J86.9]      Priority: High   • Bacteremia due to methicillin susceptible Staphylococcus aureus (MSSA) [R78.81, B95.61]      Priority: High   • Sepsis (HCC) [A41.9]      Priority: High   • Acute encephalopathy [G93.40]      Priority: High   • Fever [R50.9]      Priority: Medium   • Pleural effusion, left [J90]      Priority: Medium   • Atelectasis [J98.11]      Priority: Medium   • CSF pleocytosis [D72.9]      Priority: Medium   • Pneumonia [J18.9]      Priority: Medium   • Tachycardia [R00.0]      Priority: Medium   • Pseudotumor cerebri [G93.2]      Priority: Medium     IMO load March 2020     • S/P  shunt [Z98.2]      Priority: Medium   • Prolonged Q-T interval on ECG [R94.31]      Priority: Low   • History of vasculitis [Z86.79]      Priority: Low   • History of complicated migraines [G43.109]      Priority: Low   • Hyponatremia [E87.1]      Priority: Low   • H/O: CVA (cerebrovascular accident) [Z86.73]      Priority: Low   • Chronic pain syndrome [G89.4]      Priority: Low   • Port or reservoir infection [T80.212A]      Priority: Low   • Thrombocytopenia (HCC) [D69.6]      Priority: Low   • Hypokalemia [E87.6]      Priority: Low   • Hypomagnesemia [E83.42]      Priority: Low   • Anxiety [F41.9]  "     Priority: Low   • Shock (HCC) [R57.9]    • Debility [R53.81]    • Elevated alkaline phosphatase level [R74.8]    • Spinal cord stimulator status [Z96.89]    • Goals of care, counseling/discussion [Z71.89]    • Pulmonary abscess (HCC) [J85.2]    • Anasarca [R60.1]    • Thrombocytosis (HCC) [D47.3]    • GERD (gastroesophageal reflux disease) [K21.9]    • Septic shock (HCC) [A41.9, R65.21]    • Anemia [D64.9]          P:   -Both chest tubes removed today.  Tegaderm over gauze.  Keep in place for 2 days.  -Continue aggressive pulmonary hygiene  -Remove Prevena dressing when the battery runs out or before the patient is transferred to a rehab facility, whichever happens first  -All further care per primary team  -Patient will need chest tube sutures removed in approximately 2 weeks.  This can either be performed by the rehab facility or at my office if she can transport there  -Call surgery for any concerns with the thoracotomy incision or other questions    Drew Vasquez M.D.  [facility]  [phone]

## 2021-05-27 LAB
ALBUMIN SERPL BCP-MCNC: 2.7 G/DL (ref 3.2–4.9)
ALBUMIN/GLOB SERPL: 0.6 G/DL
ALP SERPL-CCNC: 641 U/L (ref 30–99)
ALT SERPL-CCNC: 23 U/L (ref 2–50)
ANION GAP SERPL CALC-SCNC: 9 MMOL/L (ref 7–16)
AST SERPL-CCNC: 18 U/L (ref 12–45)
BILIRUB SERPL-MCNC: 0.4 MG/DL (ref 0.1–1.5)
BUN SERPL-MCNC: 8 MG/DL (ref 8–22)
CALCIUM SERPL-MCNC: 9 MG/DL (ref 8.5–10.5)
CHLORIDE SERPL-SCNC: 101 MMOL/L (ref 96–112)
CO2 SERPL-SCNC: 26 MMOL/L (ref 20–33)
CREAT SERPL-MCNC: 0.17 MG/DL (ref 0.5–1.4)
ERYTHROCYTE [DISTWIDTH] IN BLOOD BY AUTOMATED COUNT: 57.7 FL (ref 35.9–50)
GLOBULIN SER CALC-MCNC: 4.4 G/DL (ref 1.9–3.5)
GLUCOSE SERPL-MCNC: 88 MG/DL (ref 65–99)
HCT VFR BLD AUTO: 30 % (ref 37–47)
HGB BLD-MCNC: 9.5 G/DL (ref 12–16)
MAGNESIUM SERPL-MCNC: 1.8 MG/DL (ref 1.5–2.5)
MCH RBC QN AUTO: 29 PG (ref 27–33)
MCHC RBC AUTO-ENTMCNC: 31.7 G/DL (ref 33.6–35)
MCV RBC AUTO: 91.5 FL (ref 81.4–97.8)
PHOSPHATE SERPL-MCNC: 4.8 MG/DL (ref 2.5–4.5)
PLATELET # BLD AUTO: 419 K/UL (ref 164–446)
PMV BLD AUTO: 9.1 FL (ref 9–12.9)
POTASSIUM SERPL-SCNC: 3.8 MMOL/L (ref 3.6–5.5)
PROT SERPL-MCNC: 7.1 G/DL (ref 6–8.2)
RBC # BLD AUTO: 3.28 M/UL (ref 4.2–5.4)
SODIUM SERPL-SCNC: 136 MMOL/L (ref 135–145)
WBC # BLD AUTO: 6.1 K/UL (ref 4.8–10.8)

## 2021-05-27 PROCEDURE — 80053 COMPREHEN METABOLIC PANEL: CPT

## 2021-05-27 PROCEDURE — 700102 HCHG RX REV CODE 250 W/ 637 OVERRIDE(OP): Performed by: INTERNAL MEDICINE

## 2021-05-27 PROCEDURE — 770020 HCHG ROOM/CARE - TELE (206)

## 2021-05-27 PROCEDURE — A9270 NON-COVERED ITEM OR SERVICE: HCPCS | Performed by: HOSPITALIST

## 2021-05-27 PROCEDURE — 99233 SBSQ HOSP IP/OBS HIGH 50: CPT | Performed by: INTERNAL MEDICINE

## 2021-05-27 PROCEDURE — 99232 SBSQ HOSP IP/OBS MODERATE 35: CPT | Performed by: HOSPITALIST

## 2021-05-27 PROCEDURE — 94640 AIRWAY INHALATION TREATMENT: CPT

## 2021-05-27 PROCEDURE — 700111 HCHG RX REV CODE 636 W/ 250 OVERRIDE (IP): Performed by: HOSPITALIST

## 2021-05-27 PROCEDURE — A9270 NON-COVERED ITEM OR SERVICE: HCPCS | Performed by: INTERNAL MEDICINE

## 2021-05-27 PROCEDURE — 83735 ASSAY OF MAGNESIUM: CPT

## 2021-05-27 PROCEDURE — 700111 HCHG RX REV CODE 636 W/ 250 OVERRIDE (IP): Performed by: INTERNAL MEDICINE

## 2021-05-27 PROCEDURE — 700102 HCHG RX REV CODE 250 W/ 637 OVERRIDE(OP): Performed by: HOSPITALIST

## 2021-05-27 PROCEDURE — 84100 ASSAY OF PHOSPHORUS: CPT

## 2021-05-27 PROCEDURE — 85027 COMPLETE CBC AUTOMATED: CPT

## 2021-05-27 RX ORDER — MAGNESIUM SULFATE HEPTAHYDRATE 40 MG/ML
2 INJECTION, SOLUTION INTRAVENOUS ONCE
Status: COMPLETED | OUTPATIENT
Start: 2021-05-27 | End: 2021-05-27

## 2021-05-27 RX ORDER — OXYCODONE HYDROCHLORIDE 5 MG/1
5 TABLET ORAL ONCE
Status: COMPLETED | OUTPATIENT
Start: 2021-05-27 | End: 2021-05-27

## 2021-05-27 RX ADMIN — ONDANSETRON 4 MG: 2 INJECTION INTRAMUSCULAR; INTRAVENOUS at 14:58

## 2021-05-27 RX ADMIN — DOCUSATE SODIUM 50 MG AND SENNOSIDES 8.6 MG 2 TABLET: 8.6; 5 TABLET, FILM COATED ORAL at 05:13

## 2021-05-27 RX ADMIN — DIVALPROEX SODIUM 500 MG: 125 CAPSULE ORAL at 14:57

## 2021-05-27 RX ADMIN — MIDODRINE HYDROCHLORIDE 5 MG: 5 TABLET ORAL at 05:13

## 2021-05-27 RX ADMIN — CEFAZOLIN SODIUM 2 G: 2 INJECTION, SOLUTION INTRAVENOUS at 05:15

## 2021-05-27 RX ADMIN — DIVALPROEX SODIUM 500 MG: 125 CAPSULE ORAL at 05:13

## 2021-05-27 RX ADMIN — PAROXETINE HYDROCHLORIDE 10 MG: 20 TABLET, FILM COATED ORAL at 05:13

## 2021-05-27 RX ADMIN — OXYCODONE 5 MG: 5 TABLET ORAL at 11:07

## 2021-05-27 RX ADMIN — OXYCODONE 5 MG: 5 TABLET ORAL at 16:09

## 2021-05-27 RX ADMIN — METHADONE HYDROCHLORIDE 30 MG: 10 TABLET ORAL at 05:13

## 2021-05-27 RX ADMIN — GABAPENTIN 300 MG: 300 CAPSULE ORAL at 05:13

## 2021-05-27 RX ADMIN — DULOXETINE HYDROCHLORIDE 60 MG: 60 CAPSULE, DELAYED RELEASE ORAL at 16:09

## 2021-05-27 RX ADMIN — CEFAZOLIN SODIUM 2 G: 2 INJECTION, SOLUTION INTRAVENOUS at 14:57

## 2021-05-27 RX ADMIN — RISPERIDONE 2 MG: 1 TABLET, FILM COATED ORAL at 05:15

## 2021-05-27 RX ADMIN — RISPERIDONE 2 MG: 1 TABLET, FILM COATED ORAL at 16:09

## 2021-05-27 RX ADMIN — POTASSIUM BICARBONATE 25 MEQ: 978 TABLET, EFFERVESCENT ORAL at 07:34

## 2021-05-27 RX ADMIN — OXYCODONE 5 MG: 5 TABLET ORAL at 22:20

## 2021-05-27 RX ADMIN — DULOXETINE HYDROCHLORIDE 60 MG: 60 CAPSULE, DELAYED RELEASE ORAL at 05:15

## 2021-05-27 RX ADMIN — ENOXAPARIN SODIUM 40 MG: 40 INJECTION SUBCUTANEOUS at 05:15

## 2021-05-27 RX ADMIN — GABAPENTIN 300 MG: 300 CAPSULE ORAL at 22:20

## 2021-05-27 RX ADMIN — DIVALPROEX SODIUM 500 MG: 125 CAPSULE ORAL at 22:20

## 2021-05-27 RX ADMIN — GABAPENTIN 300 MG: 300 CAPSULE ORAL at 14:57

## 2021-05-27 RX ADMIN — MAGNESIUM SULFATE 2 G: 2 INJECTION INTRAVENOUS at 07:34

## 2021-05-27 RX ADMIN — ACETAMINOPHEN 500 MG: 325 TABLET, FILM COATED ORAL at 22:20

## 2021-05-27 RX ADMIN — CEFAZOLIN SODIUM 2 G: 2 INJECTION, SOLUTION INTRAVENOUS at 21:24

## 2021-05-27 RX ADMIN — ACETAMINOPHEN 500 MG: 325 TABLET, FILM COATED ORAL at 07:34

## 2021-05-27 RX ADMIN — ACETAMINOPHEN 500 MG: 325 TABLET, FILM COATED ORAL at 14:56

## 2021-05-27 ASSESSMENT — ENCOUNTER SYMPTOMS
NERVOUS/ANXIOUS: 0
SHORTNESS OF BREATH: 0
FEVER: 0
HEMOPTYSIS: 0
HEADACHES: 0
DIARRHEA: 0
FLANK PAIN: 0
COUGH: 1
SORE THROAT: 0
SPUTUM PRODUCTION: 0
WEAKNESS: 1
EYES NEGATIVE: 1
SPUTUM PRODUCTION: 1
PALPITATIONS: 0
FOCAL WEAKNESS: 0

## 2021-05-27 ASSESSMENT — FIBROSIS 4 INDEX
FIB4 SCORE: 0.49
FIB4 SCORE: 0.43

## 2021-05-27 ASSESSMENT — PAIN DESCRIPTION - PAIN TYPE
TYPE: ACUTE PAIN;SURGICAL PAIN
TYPE: ACUTE PAIN;SURGICAL PAIN
TYPE: CHRONIC PAIN
TYPE: ACUTE PAIN

## 2021-05-27 NOTE — PROGRESS NOTES
2 RN skin check complete with CAROLINA Franco.   Devices in place: trach, coretrak, PICC, Previna right chest    Skin assessed under devices: complete  Confirmed pressure ulcers found on: Right big toe, left 3rd toe    New potential pressure ulcers noted: NA    Wound consult placed : Already following  The following interventions in place: pillows in use for repositioning, moisturizer,  incontinence care prn     Bilateral ears pink/blanching  Bilateral elbows clean, dry and intact  Sacrum is excoriated, red/blanching, some open areas, picture added to LDA  Bilateral heels clean, dry and intact  Previna on right chest area from old chest tube site  Right big toe has a DTI, black/purple, updated picture added  Left third toe has a DTI, black/purple, updated picture added

## 2021-05-27 NOTE — PROGRESS NOTES
"Critical Care Progress Note    Date of admission  4/16/2021    Chief Complaint  \"50 y.o. female the past medical history of pseudotumor cerebri status post  shunt by Dr. Oh, chronic pain with history of placement of nerve stimulator, vasculitis, right anterior chest port for home IV meds with recurrent infection who presented 4/11/2021 with to [facility] with recurrent port infection. Found to have MSSA bacteremia, endocarditis, septic pulmonary emboli/empyema/pneumatocele, and CSF pleocytosis concerning for  shunt involvement.  Seen by Dr. Oh, NSGY, at [facility] who did not recommend  shunt removal.  Seen by Dr. Vargas here for concern of fluid collection around her spinal stimulator and he recommended against removal. Remains intubated, encephalopathic.\"  5/17:Critical care signing back on tonight after total right lung decortication for organized empyema  She was recovered in PACU, and is now on T-piece.  Follow-up ABG showed mild respiratory acidosis  She has 2 right chest tubes with marked improvement in aeration of right lung postoperatively, no pneumothorax on CXR  Minimize sedation, dexmedetomidine tonight if needed, pulmonary hygiene, follow-up arterial blood gas, positive airway pressure if needed     Hospital Course  4/16 admitted to ICU  4/17 VD 2, 2 FFP, pRBC overnight; new chest tube per gen surg  4/18 VD 3, TPa/Dornase with 1400 cc from R chest tube  4/26 R chest tube not functioning, d/c it.  broke his leg and going to OR today, so not available currently.  VD 11.  8/30%. RSBI immediately high on SBT attempt. Followed commands for RN  4/27 VD12. 8/30%. Not tolerating wean, high RSBI and tachycardic.   4/28 VD 13. VATS with Dr. Ganser.    4/29 VD 14. 8/30%. Still not tolerating wean d/t severe tachycardia just with SAT. Trialed precedex and she required fentanyl up to 600/hr to tolerate, so back on propofol. Chest tubes -40  4/30 VD 15. 8/30%. Try ketamine/versed as sedative. Severe " tachycardia and HTN with weaning down propofol. Plan for perc trach tomorrow. Chest tubes -40  5/1 perc trach. PICC placed. Chest tubes remain -40.  5/2 Able to spont well on sedation, but very tachy/HTN when sedation is turned down.  5/3 midazolam weaned down to 3mg/min  5/4 off versed and ketamine gtt  5/5 trials of SBT w/ trach  5/6 T piece trial during day, mobilize to chair x 2, rest vent 8/8 at night, started methadone, gabapentin   5/7 T piece during day with, rest PS 8/8 overnight, spacing out dilaudid PRNs  5/8 T-piece during day, still with anxiety weaning dex gtt  5/10 CT chest  5/11 will require thoracotomy and decortication  5/12 off ventilator x 24 hours  5/13 replete magnesium, remains on T-piece, ambulating, transfer to med/surg  5/17 -right thoracotomy with total lung decortication, remains on T-piece postoperatively did well  5/18 on T piece but with increase tachycardia 2l IV fluids, increase pain regime, lethargic just asking for dilaudid  5/19 am shock, bolused fluid and gave blood, place on vent and checked multiple labs, stress dose steroids, inc antibiotic coverage re-cultured, ill appearing, was on pressor and febrile  5/20 on vent to keep right lung open, 1 prbc, low dose pressor occaisional  5/21 on vent sponting well, 1 prbc given, off pressor/started midodrine, klebsiella on meropenem, CTx2 with minimal output, wound vac  5/22 On vent, s/p bronch with no significant findings, low dose dex gtt  5/23 On vent overnight, Tpiece trials darted in a.m., merrem  5/24 off vent in a.m. 5/24, Tpiece trials, Merrem, LTACH consult placed  5/25 off vent for 24 hours, well-tolerated, ambulated in hallway  5/26 off vent, CTs pulled, passed FEES, starting modified diet, transfer to med team/tele    Overnight events/rouding:  Reviewed last 24 hour events:    Patient with transfer orders but still in [facility], covering for pulmonary service  F EES performed yesterday and patient passed, modified diet  starting  Alert and following, voice good with PSMV, oriented  Pain improved with chest tubes out, tolerating oral regimen  SR/ST 90-110s, overall heart rate improved  SBP 90-130s  UO adequate  T-piece x72 hours, WOB remains low  Failure to 28%-sats 95-96%  T-max 97.3  WBC 6.1  Ancef, last day 6/14  VPA  Hemoglobin 9.5  Methadone with breakthrough oxycodone  Depakote/Cymbalta/gabapentin/Paxil/Risperdal      Monitor for aspiration  Mobilize/therapies  Monitor for readiness for cap trials of tracheostomy tube, request pulmonary to follow when out of ICU  LTACH eval still ongoing apparently accepted by [facility] but no Insurance Auth as of yet?  Reassess med list daily, does she need all her medications?       YESTERDAY  A&O x4  Follows  Pain an issue but better with CTs out per [place] today-Dr. Vasquez  SR/ST 90-110s  SBp 110-150s  UO adequate  Tpiece 5/30%, off vent > 48 hrs - WOB low  Secretions better, small  Strong cough  CXR improving  Merrem -> Ancef per ID  Keppra -> VPA per pharmacy, Merrem effects VPA clearance      D/c IV dilaudid  Transfer to telemetry  LTACH transfer pending, accepted but waiting on insurance clearance      FEES performed by speech, passed swallow eval and modified diet to start, cannot tolerate thins.  Observed part of the procedure, vocal cords and epiglottis look normal with excellent approximation and function    Review of Systems  Review of Systems   Constitutional: Positive for malaise/fatigue (Improved). Negative for fever.   HENT: Negative for congestion and sore throat.    Eyes: Negative.    Respiratory: Positive for cough. Negative for hemoptysis, sputum production and shortness of breath.    Cardiovascular: Negative for chest pain (Much better with chest tubes) and palpitations.   Genitourinary: Negative for flank pain and hematuria.   Musculoskeletal:        Chronic pain syndrome   Neurological: Positive for weakness (Weak all over). Negative for focal weakness and headaches.    Psychiatric/Behavioral: The patient is not nervous/anxious (Nicely controlled today).    Limited by tracheostomy    Vital Signs for last 24 hours   Temp:  [36 °C (96.8 °F)-36.3 °C (97.3 °F)] 36.2 °C (97.2 °F)  Pulse:  [] 111  Resp:  [14-23] 14  BP: ()/(49-80) 87/56  SpO2:  [93 %-97 %] 95 %    Hemodynamic parameters for last 24 hours       Respiratory Information for the last 24 hours       Physical Exam   Physical Exam  Vitals and nursing note reviewed.   Constitutional:       General: She is not in acute distress.     Appearance: She is ill-appearing (Continues to improve, appears more chronic than acute now). She is not toxic-appearing or diaphoretic.      Comments: Laying in bed chronic ill appearing improved color   HENT:      Head: Normocephalic and atraumatic.      Comments: Purple hair     Nose: Nose normal.      Comments: cortrack     Mouth/Throat:      Mouth: Mucous membranes are moist.      Pharynx: Oropharynx is clear.   Eyes:      General: No scleral icterus.     Pupils: Pupils are equal, round, and reactive to light.   Neck:      Comments: Tracheostomy in place on vent, site okay  Cardiovascular:      Rate and Rhythm: Regular rhythm. Tachycardia present.      Pulses: Normal pulses.      Heart sounds: Normal heart sounds. No murmur heard.   No gallop.    Pulmonary:      Effort: Pulmonary effort is normal.      Breath sounds: Rhonchi (Minimal) present. No wheezing.      Comments: Chest tubes removed 5/26    Abdominal:      General: There is no distension.      Palpations: Abdomen is soft. There is no mass.      Tenderness: There is no abdominal tenderness. There is no right CVA tenderness, left CVA tenderness, guarding or rebound.      Hernia: No hernia is present.   Musculoskeletal:         General: No swelling or tenderness. Normal range of motion.      Cervical back: Normal range of motion and neck supple.      Right lower leg: No edema.      Left lower leg: No edema.   Skin:     General:  Skin is warm and dry.      Capillary Refill: Capillary refill takes less than 2 seconds.      Coloration: Skin is not cyanotic, jaundiced or mottled.      Findings: No bruising or erythema.      Nails: There is no clubbing.   Neurological:      General: No focal deficit present.      Mental Status: She is alert.      GCS: GCS eye subscore is 4. GCS verbal subscore is 1. GCS motor subscore is 6.      Comments: Alert, follows commands, moves all 4 extremities, mouths words, good speech with PSMV and content appropriate, writing on board, CN intact, up in chair   Psychiatric:         Mood and Affect: Mood is not anxious.         Behavior: Behavior is not agitated. Behavior is cooperative.         Medications  Current Facility-Administered Medications   Medication Dose Route Frequency Provider Last Rate Last Admin   • divalproex (DEPAKOTE SPRINKLE) capsule 500 mg  500 mg Enteral Tube Q8HRS Man Wahl M.D.   500 mg at 05/27/21 0513   • ceFAZolin in dextrose (ANCEF) IVPB premix 2 g  2 g Intravenous Q8HRS ISABEL Rees M.D.   Stopped at 05/27/21 0545   • enoxaparin (LOVENOX) inj 40 mg  40 mg Subcutaneous DAILY Juventino Lozano M.D.   40 mg at 05/27/21 0515   • Respiratory Therapy Consult   Nebulization Continuous RT Juventino Lozano M.D.       • MD Alert...ICU Electrolyte Replacement per Pharmacy   Other PHARMACY TO DOSE Juventino Lozano M.D.       • gabapentin (NEURONTIN) capsule 300 mg  300 mg Enteral Tube Q8HRS Juventino Lozano M.D.   300 mg at 05/27/21 0513   • methadone (DOLOPHINE) tablet 30 mg  30 mg Enteral Tube DAILY Juventino Lozano M.D.   30 mg at 05/27/21 0513   • diazePAM (VALIUM) tablet 5 mg  5 mg Enteral Tube QHS PRN Raphael Armstrong M.D.   5 mg at 05/26/21 0569   • ondansetron (ZOFRAN) syringe/vial injection 4 mg  4 mg Intravenous Q4HRS PRN Juventino Lozano M.D.   4 mg at 05/13/21 0431   • acetaminophen (TYLENOL) tablet 500 mg  500 mg Enteral Tube TID Juventino Lozano M.D.   500 mg at  05/27/21 0734   • oxyCODONE immediate-release (ROXICODONE) tablet 5 mg  5 mg Enteral Tube Q4HRS PRN Juventino Lozano M.D.   5 mg at 05/26/21 1735   • labetalol (NORMODYNE/TRANDATE) injection 10 mg  10 mg Intravenous Q4HRS PRN Juventino Lozano M.D.   10 mg at 05/04/21 0226   • Pharmacy Consult: Enteral tube insertion - review meds/change route/product selection  1 Each Other PHARMACY TO DOSE Juventino Lozano M.D.       • acetaminophen (Tylenol) tablet 650 mg  650 mg Enteral Tube Q4HRS PRN Juventino Lozano M.D.   650 mg at 05/12/21 1233   • magnesium hydroxide (MILK OF MAGNESIA) suspension 30 mL  30 mL Enteral Tube QDAY PRN Juventino Lozano M.D.        And   • senna-docusate (PERICOLACE or SENOKOT S) 8.6-50 MG per tablet 2 tablet  2 tablet Enteral Tube BID Juventino Lozano M.D.   2 tablet at 05/27/21 0513    And   • polyethylene glycol/lytes (MIRALAX) PACKET 1 Packet  1 Packet Enteral Tube QDAY PRN Juventino Lozano M.D.   1 Packet at 05/20/21 0505    And   • bisacodyl (DULCOLAX) suppository 10 mg  10 mg Rectal QDAY PRN Juventino Lozano M.D.       • DULoxetine (CYMBALTA) capsule 60 mg  60 mg Enteral Tube BID Elliott Salvador M.D.   60 mg at 05/27/21 0515   • PARoxetine (PAXIL) tablet 10 mg  10 mg Enteral Tube QAM Elliott Salvador M.D.   10 mg at 05/27/21 0513   • risperiDONE (RISPERDAL) tablet 2 mg  2 mg Enteral Tube BID Elliott Salvador M.D.   2 mg at 05/27/21 0515   • ipratropium-albuterol (DUONEB) nebulizer solution  3 mL Nebulization Q2HRS PRN (RT) Juventino Lozano M.D.           Fluids    Intake/Output Summary (Last 24 hours) at 5/27/2021 1100  Last data filed at 5/27/2021 0734  Gross per 24 hour   Intake 420 ml   Output 1350 ml   Net -930 ml       Laboratory          Recent Labs     05/25/21  0116 05/26/21  0220 05/27/21  0507   SODIUM 136 136 136   POTASSIUM 3.8 3.8 3.8   CHLORIDE 102 102 101   CO2 27 27 26   BUN 11 14 8   CREATININE <0.17* <0.17* 0.17*   MAGNESIUM 1.8 1.8 1.8   PHOSPHORUS  --   --   4.8*   CALCIUM 8.5 9.0 9.0     Recent Labs     05/24/21  1500 05/25/21  0116 05/26/21  0220 05/27/21  0507   ALTSGPT  --   --   --  23   ASTSGOT  --   --   --  18   ALKPHOSPHAT  --   --   --  641*   TBILIRUBIN  --   --   --  0.4   PREALBUMIN 23.5  --   --   --    GLUCOSE  --  96 111* 88     Recent Labs     05/25/21  0116 05/26/21 0220 05/27/21  0507   WBC 8.6 8.8 6.1   NEUTSPOLYS 71.40 75.00*  --    LYMPHOCYTES 17.30* 14.50*  --    MONOCYTES 9.80 9.00  --    EOSINOPHILS 0.40 0.20  --    BASOPHILS 0.50 0.70  --    ASTSGOT  --   --  18   ALTSGPT  --   --  23   ALKPHOSPHAT  --   --  641*   TBILIRUBIN  --   --  0.4     Recent Labs     05/25/21  0116 05/26/21 0220 05/27/21  0507   RBC 3.22* 3.40* 3.28*   HEMOGLOBIN 9.2* 9.6* 9.5*   HEMATOCRIT 29.9* 31.6* 30.0*   PLATELETCT 426 447* 419       Imaging  X-Ray:  I have personally reviewed the images and compared with prior images.  EKG:  I have personally reviewed the images and compared with prior images.  CT:    Reviewed  Echo:   Reviewed    Assessment/Plan  * Empyema of right pleural space (HCC)- (present on admission)  Assessment & Plan  R empyema due to septic pulmonary emboli  4/16 Chest tube placement at [facility]  4/18 Upsized by Dr Benedict  4/18-4/20 Received TPA/dornase.  Stopped b/c Hgb dropped requiring transfusion  4/26 was discontinued due to stopped functioning   4/28 s/p right VATS and decortication. Cx grew of Klebsiella pneumonia  5/17 total right lung decortication for organized empyema, right chest tubes x2  Output for several days has been 0 to 20 cc  Chest x-ray improving  Chest tubes x2 removed 5/26 by Dr. Vasquez  Follow-up chest x-ray as needed    Shock (HCC)  Assessment & Plan  Undifferentiated acute am of 5/19 - improved/resolved  Fluid bolus, PRBC blood cultures, sputum, ID follow with broadening antibiotics  Lactate normal  Hg 6.5 initially, will serial monitor hg, No coagulopathy  emperic rx with stress dose steroids and cortisol ok  Passive leg raise  unremarkable     Debility  Assessment & Plan  PT/OT/SLP eval    Elevated alkaline phosphatase level- (present on admission)  Assessment & Plan  Unchanged --> recheck tomorrow  With dilated biliary ducts on imaging, stable  Unable to get MRCP due to nerve stimulator, ?  Eventual ERCP    Goals of care, counseling/discussion  Assessment & Plan  Palliative consulted  Full code    Spinal cord stimulator status  Assessment & Plan  Patient's stimulator is [employer]. It is FDA approved as MRI compatible, but the company has gone out of business, so there is no support team to assist with MRI.  Thus, if MRI were performed, our radiologists would need to protocol it correctly to manage the stimulator. The former rep from [employer] is Andre, [phone].  Information obtained from Dr. Mahoney's office, [phone].    Per  (5/4/2021), it is not MRI compatible unfortunately.     Anasarca- (present on admission)  Assessment & Plan  Secondary to chronic critical illness  Optimize nutrition and volume status    Pleural effusion, left  Assessment & Plan  Parapneumonic fluid from abscesses  CT guided chest tube placed 4/23  TPA/dornase one dose on 4/24.  Hold d/t dropping Hgb  CT chest 4/25 with resolution of fluid  CT clamped 4/29-no significant accumulation of fluid so d/c 4/30  Monitor for now radiographically-chest x-ray is improving, repeat PRN  Mobilize as much as able and continue nutritional support, diuresis as needed    Atelectasis  Assessment & Plan  Encourage mobility  Aggressive pulmonary toilet  Encourage coughing every-2 hours while awake  Suction as needed through tracheostomy  Chest tubes in place for effusion/empyema, transfer removed 5/26  Chest x-ray improving, recheck PRN    Pneumonia  Assessment & Plan  MSSA septic emboli with empyema   BAL 4/20 klebsiella pneumonia  BAL 4/24 still heavy growth of klebsiella  4/28 OR pleural fluid specimen growing klebsiella.   Continue antibiotics per ID -> meropenem for  7 days and then back to Ancef, last day 6/14  Worsening infiltrates and sputum on right lung place on ventilator -> benign on bronchoscopy for pna 5/22  ABX per ID    History of vasculitis- (present on admission)  Assessment & Plan  History of    Bacteremia due to methicillin susceptible Staphylococcus aureus (MSSA)- (present on admission)  Assessment & Plan  Source presumed right anterior chest line/port with endocarditis  Multiple sources for MSSA including MV/TV vegetation, port, spinal stimulator/ shunt  Status post port removal at [facility]  Last positive blood cultures were 4/17  Status post treatment with nafcillin from 4/16-4/23  ID following, continue meropenem 7days then back to Ancef 5/26 with last day Ancef 6/14 per ID    Acute bacterial endocarditis- (present on admission)  Assessment & Plan  MSSA bacteremia at OSH. First negative blood cultures were on 4/17  Infected port removed at [facility]   shunt and spinal stimulator could be seeded, NSGY has consulted on both and don't recommend removal at admission due to HD instability.   MV and TV vegetations with septic pulmonary emboli  Nataly ID following, antibiotics escalated to cefepime->Merrem for VAP and Klebsiella on prior pleural fluid  Back to Ancef 5/26 per ID, last dose 6/14 per ID  Follow-up cultures from decortication 5/17 -> none taken  Follow-up echocardiogram and follow-up appointment with cards and/or [facility] in  Late June?    Prolonged Q-T interval on ECG- (present on admission)  Assessment & Plan  Continue to optimize electrolytes  Avoid QT prolonging medications as able  Repeat EKG as needed  Cardiac monitoring    Acute respiratory failure with hypoxia (HCC)- (present on admission)  Assessment & Plan  4/15 Intubated for acute hypoxic respiratory failure at [facility] due to hydro/pneumo, trapped lung, lung abscesses. 5/1 s/p percutaneous tracheostomy  Place back on ventilator for peep and recruitment and rest with her acute shock 5/19 gas exchange  good  5/21 SBT trials monitor for de-recruitment on CXR  Bronchoscopy 5/22 was benign  T piece trials 5/23, off the ventilator since a.m. 5/24  PSMV trials ongoing, voice better  Chest tubes placed to waterseal 5/25 and removed 5/26  Follow-up chest x-ray as needed   mobilize/pulmonary toilet  Monitor for readiness for trach capping trials    History of complicated migraines- (present on admission)  Assessment & Plan  Monitor for recurrence now that she is off sedation and off the vent, no headache today    GERD (gastroesophageal reflux disease)- (present on admission)  Assessment & Plan  Antireflux measures, monitor for the need for PPI    Tachycardia  Assessment & Plan  Persistent, sinus tach, now starting to see occasional sinus rhythm in the 90s, slowly better  Treat underlying causes    H/O: CVA (cerebrovascular accident)- (present on admission)  Assessment & Plan  History of    Chronic pain syndrome- (present on admission)  Assessment & Plan  Chronic back pain and intractable headaches  History of nerve stimulator   Dr Vargas consulted at admission, no plan to remove stimulator due to her clinical instability  Multimodal with scheduled oxycodone, gabapentin, methadone (increased dose 5/11)  Hopefully reduce some of her sedating agents her chest tubes removed and she becomes more mobile  Patient lobbying hard for IV narcotics, will continue them at low-dose through chest tube removal 5/26, tubes pulled and IV Dilaudid discontinued midday 5/26  On methadone with breakthrough enteral oxycodone  Monitor for the need for pain specialist consultation short-term and long-term    Port or reservoir infection  Assessment & Plan  Recurrent venous access port infection R ant-chest, likely initial infection triggering entire admission to [facility]/Transfer to [facility] post removal again of port 4/12 by Dr. Candelaria  MSSA positive cultures with right-sided endocarditis and septic emboli to lung complicated by  right-sided empyema    Anemia- (present on admission)  Assessment & Plan  Daily CBC with conservative transfusion strategy  5/19 transfusion 1 prbc, 5/20 1 prbc, 5/21 1 prbc  Continue to monitor for bleeding in right lung CT's are minimal  Restart VTE prophylaxis monitor closely for bleeding 5/23    Sepsis (HCC)  Assessment & Plan        Hypomagnesemia  Assessment & Plan  Serial mag level as needed  Replete to keep > 2.0, ERP was up    Hypokalemia  Assessment & Plan  Serial BMP  Replete to keep greater than 4.0, ERP    Anxiety- (present on admission)  Assessment & Plan  Improving  Continue Depakote, paxil, cymbalta, gabapentin, as clinically improves transition off some of these agents?  Limited as needed benzodiazepines    S/P  shunt- (present on admission)  Assessment & Plan  History of pseudotumor cerebri  History of  shunt by Dr. Oh  Could be seeded by [facility], Dr. Oh consulted at [facility], recommended MRI but unable given her spinal stimulator    Acute encephalopathy- (present on admission)  Assessment & Plan  Reorient and maintain sleep/wake cycle, mobilize  Limit sedative when able, attempting to reduce narcotic burden, patient requesting more narcotics regularly  Discontinuing IV narcotics  Attempt to slowly wean some of her other oral sedating agents as pain and anxiety allow  Difficult to treat anxiety and acute/chronic pain  Improved overall, nearing baseline?    I have performed a physical exam and reviewed and updated ROS and Plan today (5/27/2021). In review of yesterday's note (5/26/2021), there are no changes except as documented above.     Discussed patient condition and risk of morbidity and/or mortality with Hospitalist, RN, RT, Pharmacy, Charge nurse / hot rounds, Patient and Speech therapy.      Case discussed with Dr. Lin again, patient clinically improving, off the ventilator for 72 hours, will transfer to telemetry (orders in 5/26), RCC will sign off when she moves out of the ICU,  pulmonary to follow when out of ICU, monitor for readiness for cap trials for tracheostomy, starting modified diet first, passed FEES.

## 2021-05-27 NOTE — DIETARY
Nutrition Services: Day 41 of admit.  Enedelia Pang is a 50 y.o. female with admitting DX of Empyema, sepsis due to port line infection, endocarditis  Please hold TF 5/26-5/28 (PO diet per SLP).   Keep Cortrak in place.   RD to re-assess on 5/28.

## 2021-05-27 NOTE — PROGRESS NOTES
Infectious Disease Daily Progress Note    Date of Service  5/27/2021  Current Antibiotic Rx: Cefazolin 5/26-present.    Meropenem 5/19-5/26   Cefepime 4/23 5/18-  s/p 23 days  Cefazolin 5/18-5/19     Thoracoscopy & Decortication - 4/28  Decortication 5/17     PRIOR Rx:   Zosyn 4/22-4/23  Previous: Unasyn, Zosyn, Vanco, Daptomycin  Ceftaroline: start 4/13-4/15  Nafcillin 2g Q4 hrs 4/15-4/23 4/14: Unable to give name of previous NSG or neurologist. Seems confused, but able to say some sentences fluently.   4/15: Line cultures now growing MSSA. As well as from the blood. Repeat blood culture 4/13+ in both sets. Patient has worsening confusion. CSF fluid analyses suggest pleocytosis. Cultures are no growth from the CSF. Chest CT was ordered this morning indicating a loculated right pleural effusion as well as bilateral septic emboli. Dr. Mack spoke with Dr. Oh yesterday and no surgical intervention unless clinical deterioration.  4/16: Increased respiratory distress.  Tachypneic.  CT with loculated collection.  Dr Resendiz not available.  No thoracic surgeon available.  Plans to have pulmonary place CT.  Transferring to ICU.  4/17: Transferred to [facility] last night. Hgb dropped to 6.4 requiring PRBC transfusion. Requiring 2 pressors. Fio2 50%. Febrile 38.8. Pending OR decortication of empyema and hemothorax. Chest tube [facility] at [facility] shows 8,371 WBC, ,000, 74% N  4/18: Chest tube was upsized by surgery yesterday, no decortication. WBC 22k. Fio2 40%. Vasopressin. Levophed down to 2mcg. Afebrile 24 hrs. Last fever 38.6 on 4/16.   4/19: Still on vent. Levo 3 mcg, vasopressin. Afebrile 72 hours. WBC 21k. BC 4/17 NGTD x 48 hours. Unable to do MRI brain given neurostimulator per RN.   4/20: Remaining afebrile. Hb 6.2 and undergoing transfusion today.WBC 24,100, 5% bands.1700 ml CT output. Copious bloody ET secretions. No pressors. Receiving dornase and TPA per CT. Right pleural effusion not large per CXR  today.   4/21: WBC 31k. Bronchoscopy yesterday showing blood. Cultures sent. TPA on hold per RN due to arvind blood from chest tube requiring PRBC transfusion for hgb 6. Pending chest CT today. Per , spinal stimulator is non-MRI compatible. Levophed 10mcg. 30% Fio2. Afebrile.   4/22: Fever 101.3 this am. WBC 20,300 down from 32,200 yesterday. BAL growing Klebsiella pneumoniae but susceptibility panel not set up until today. CTA of brain shows no lesion. CT of chest shows enlarging cavitary lung lesions bilat and large loculated new left pleural effusion and large hydropneumothorax on right. TPA & dornase infusions of right chest ended 4/20 after considerable bleeding requiring transfusion. Hb now down again to 6.8.  4/23: WBC 23k. Fio2 40%. Tmax 38.1. US of stiumlator shows small fluid collection but no drainable abscess. Pending MICAH today. Pending L chest tube placement  4/24: Continue fever 100.8-101.8. WBC 16,600. MICAH shows large vegetations on both tricuspid valve and mitral valve with mild TR & MR.  No aortic root abscess. Left chest tube placed yesterday yielding 8 ml of old bloody fluid. Bronch today revealed copious thick maroon secretions in distal trachea and both mainstem bronchi. On the right these were obstructive. Remaining off pressors. Last positive blood cultures (MSSA) were 4/16, negative 4/17.  4/25: Fever 100.6 this AM. wBC 13,300. Hb 6.6. CT: right hydropneumothorax increasing, right bronchopleural fistula, mediastinal shift ot the left , multiple cavitary pulmonary nodules, 3.1 cm left basilar mass, splenomegaly. CT of head shows dural thickening over the clivus. Lac+ GNR >100,000 col/ml growing from BAL of 4/24, presumed Klebsiella. Left peural fluid culture negative from 4/23.  4/26: Fever 100.4 last night. WBC 13,500. Hb 7.4. Plts 502,000. ESR >120. UA fairly clear except 30 mg% protein, 2-5 wbc and rare rbc. CXR unchanged except more prominent pulmonary edema. GNR in BAL may be a  different species of Klebsiella, and resistant to ampicillin & tmp-sulfa; confirmation pending.  4/28: Fever 100.8 last night. WBC 27,700. Hb 6.6. Plts 482,000. Creat 0.19. Kleb pneum in BAL on 4/24 is resistant to ampicillin & tmp-sulfa but otherwise sensitive. O2 sat 96% on FIO2 30% now, PEEP 8. Has been re-evaluated by thoracic surgeon, Dr. Ganser, who believes she will not wean without decortication on the right. Surgery anticipated today.  4/29: S/P right thoracoscopy with decortication with cultures NG at 24 hrs.  4/30: Had a bronch due to hypoxia and hypotension. CXR with worsening right hemithorax pulmonary opacities. Bloody mucous plugs removed. Pressors started. Febrile this am. Tachy in 130s. Pressors just removed. Tolerating vent, but not a SBT candidate at the moment.  5/1: Small air leak CT-2 every ~ 2/3 breathes. The K. pneumoniae from her thoracoscopy is sensitive to her cefepime so no antibiotic change needed.   5/2: No air leak today she tolerated 2  hrs of spontaneous breathing with support today.      5/3: Second day with SBT and doing well now for 2 hrs. Slight bump in temperature and      WBC's but no obvious clinical infectious changed so watch closely.      5/4: Fever still from time to time. WBCs trending up. Chest tubes in place. Trach.       5/5: She clearly is better today she was sitting up in bed with open eyes and follows commands. She still has low grade fever but her WBC's are dropping. Cefepime dose increased yesterday for increased CNS coverage if necessary. She will probably need further thoracic surgery as mentioned by Dr. Ganser. The issue of what to do with her stimulator is still up in the air for now as it probably will need to be removed but she is nort a candidate for more major surgery at this time.       5/7: She continues to improve and under go longer SBT trials. She just had a new PICC placed in her right arm and plan is to D/C central line.        5/8: PICC placed. No  fevers. Comfortable. Cortrak placed. Failing SBTs.  5/9: No acute issues. No fevers. Comfortable. Family at bedside.   5/10: No acute issues, fevers or WBC bumps. No changes in condition. She is to get her CT scan today and be reviewed by surgery  5/11:  at bedside.  Has been referred to LANE.  Repeat CT scan completed.  Results noted.  ? If Dr Ganser has seen.  Still with an empyema:  ? If candidate for further surgery.  5/12: Pending next thoracic surgery. No fever. Anxious.  5/13: ? Surgery date.  No one seems to know.  Had speaking valve in and having swallow eval with speech therapy  5/14: No acute issues. No fevers. Surgery Monday.  5/15: One CT accidentally pulled out yesterday.  Has been on T piece and tolerating. Plans for surgery Monday afternoon.  5/16: On schedule for surgery 5/17: 4:30 pm.  Moved to room T614.  No new issues.  5/17: Was sitting up in the bedside chair since change of shift.  Now walking in hallways with RN and CNA.  5/18: Decortication yesterday with 2 chest tube placement. WBC improving 21->14k  5/19: Hypotension and tachycardia.  Decreased H/H.  Placed back on vent to rest.  Dr Lozano in process of placing new line.  Antibiotics were deesculated yesterday to Cefazolin.  No cx were taken at surgery.  5/20: Pt was febrile yest afternoon 38.2, but now afebrile overnight after meropenem. WBC improved 11k->8k. Fio2 30%. Phenylephrine now off this morning. CXR shows new R consolidations.   5/21: Off pressors.  On vent: FiO2 30%, PEEP 8, PS 5 Received pRBCs yesterday.  5/22: Remains afebrile, off pressors. FiO2 30%.   5/23: Afebrile. FiO2 30%. Bronchoscopy yesterday normal.   5/24: No new cultures obtained at last bronchoscopy.  On  T piece this am: FiO2 30%.  5/25: Remaining afebrile. WBC 8600.   5/26: Chest tube removed today. Possible [facility].  5/27: O2 sat usuallyl 95% but intermittent desat to 87-89%.  Remaining afebrile.WBC 6100. Alk phos 641 but normal transaminases.      Review of Systems  Review of Systems   Unable to perform ROS: Intubated   Respiratory: Positive for cough and sputum production (copious requiring frequent suctioning. ).    Gastrointestinal: Negative for diarrhea.        Physical Exam  Temp:  [36 °C (96.8 °F)-36.3 °C (97.3 °F)] 36.2 °C (97.1 °F)  Pulse:  [] 90  Resp:  [14-21] 18  BP: ()/(49-80) 129/74  SpO2:  [93 %-97 %] 97 %    Physical Exam  Constitutional:       Appearance: Normal appearance.   HENT:      Head: Normocephalic.      Comments: NG tube     Mouth/Throat:      Mouth: Mucous membranes are moist.   Neck:      Comments: tracheostomy  Cardiovascular:      Rate and Rhythm: Normal rate and regular rhythm.   Pulmonary:      Effort: Pulmonary effort is normal.      Breath sounds: Normal breath sounds.      Comments: dimninished in R base  Abdominal:      General: Abdomen is flat. Bowel sounds are normal.      Tenderness: There is no abdominal tenderness.   Skin:     General: Skin is warm and dry.      Findings: No rash.   Neurological:      General: No focal deficit present.      Mental Status: She is alert and oriented to person, place, and time.     Laboratory  Recent Labs     05/25/21  0116 05/26/21 0220 05/27/21  0507   WBC 8.6 8.8 6.1   RBC 3.22* 3.40* 3.28*   HEMOGLOBIN 9.2* 9.6* 9.5*   HEMATOCRIT 29.9* 31.6* 30.0*   MCV 92.9 92.9 91.5   MCH 28.6 28.2 29.0   MCHC 30.8* 30.4* 31.7*   RDW 59.2* 58.5* 57.7*   PLATELETCT 426 447* 419   MPV 9.2 9.3 9.1     Recent Labs     05/25/21  0116 05/26/21  0220 05/27/21  0507   SODIUM 136 136 136   POTASSIUM 3.8 3.8 3.8   CHLORIDE 102 102 101   CO2 27 27 26   GLUCOSE 96 111* 88   BUN 11 14 8   CREATININE <0.17* <0.17* 0.17*   CALCIUM 8.5 9.0 9.0     Impression   -Severe sepsis  -Catheter related bloodstream infection due to infected port status post removal 4/12-staph aureus  -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       - Acute left empyema s/p chest tube placement 4/23, decortication 5/17, chest tube removal 5/26 - Klebsiella  -Multiple acute septic pulmonary emboli bilaterally  -Acute bilateral pneumonia due to above     --Pulmonary edema  -Pseudotumor cerebri with underlying  shunt: possible arachnoiditis  -Chronic migraine headaches  -History of autoimmune vasculitis  -Elevated alkaline phosphatase & bilirubin  -Acute encephalopathy due to sepsis      - anemia due to above          - acute fever on 5/19 likely from secondary VAP          - Secondary VAP R sided pneumonia          - decubitus ulcer sacral.      Plan   Tracheal aspirate recently 5/19 no growth, blood cultures 5/19 no growth so far. CXR shows worsening R sided infiltrates concerning for secondary infections after reviewing her CXR's between 5/18 and  5/19.  Repeated sputum cultures still reflecting her original Klebsiella empyema infection. She improved post operatively with defervescence and pressor weaning while on meropenem. Suggestive of either time, or      an anaerobic infection that we weren't covering. Continue meropenem for 7 to 8 days, then switch back to cefazolin or Unasyn to complete therapy for endocarditis.     - Received Meropenem for VAP for 7 days. Target date 5/26.  - Afterwards, de-escalated back to either cefazolin for her Klebsiella empyema and MSSA endocarditis treatment 4 weeks post recent decortication. Target date 6/14.   - Will need repeat Chest CT right before completion of antibiotics  - after completion of IV abx  Will need suppression therapy afterward for her HW (spinal stimulator,  shunt  targeting MSSA with Keflex, for example).    - chest tube removed 5/26     Dispo: Pt to transfer to [facility]. Micheal will follow patient there    30 min spent

## 2021-05-27 NOTE — PROGRESS NOTES
Report received from CAROLINA Ash. Patient arrived to tele 8. Patient oriented x4, no complaints at this time. Will continue to monitor.

## 2021-05-27 NOTE — CARE PLAN
Problem: Pain Management  Goal: Pain level will decrease to patient's comfort goal  Outcome: Progressing   Patient educated on pain medications. Patient verbalized understanding    Problem: Fall Risk  Goal: Patient will remain free from falls  Outcome: Progressing   Patient moderate fall risk. Fall precautions in place. Bed in lowest position. Non-skid socks in place. Personal belongings within reach. Mobility sign on door. Bed-alarm on. Call light within reach. Pt educated regarding fall prevention and verbalized understanding.     Problem: Mobility  Goal: Patient's capacity to carry out activities will improve  Outcome: Progressing  Patient educated on importance of ambulating. Patient ambulated to the bed upon arrival to tele 8. Patient was a hand held assist.      The patient is Watcher - Medium risk of patient condition declining or worsening    Shift Goals  Clinical Goals: increase mobility  Patient Goals: pain control

## 2021-05-27 NOTE — PROGRESS NOTES
Hospital Medicine Daily Progress Note    Date of Service  5/27/2021    Chief Complaint  50 y.o. female admitted 4/16/2021 with recurrent infections    Hospital Course   This is a 50-year-old female with a past medical history of pseudotumor cerebri s/p  shunt implantation by Dr. Oh neurosurgery, chronic pain syndrome with a history of placement of a nerve stimulator, vasculitis, right anterior chest port for home IV medication administration, recurrent infections related to port, presented on 4/11 to [facility] with recurrent port infection was initially treated with vancomycin and Zosyn and then changed to ceftaroline subsequently switched to nafcillin, MSSA identified.  [facility] infectious disease is managing antibiotics for the patient.  Dr. Candelaria surgery removed the port on 4/12.  The patient was further identified to have endocarditis of the tricuspid valve, a lumbar puncture performed on 4/14 showed pleocytosis with negative Gram stain.  The patient suffered worsening leukocytosis and was found to have septic emboli per CT.  Initially a small pleural catheter was placed.  Significant loculations were encountered.  MSSA empyema was diagnosed.  Neurosurgery was consulted to comment on possibly removal of a  shunt, this was felt not appropriate at this time.  The patient remained on mechanical ventilation for 15 days, then a perc trach was placed.  The patient was eventually liberated from mechanical ventilation, the patient did have a right thoracoscopy and decortication of the lung performed on 4/28 by Dr. Ganser.  The patient was of identified to have multiple sources for MSSA including mitral valve, tricuspid valve vegetation, port, spinal stimulator,  shunt possibly.  Patient underwent repeat chest surgery, decortication with 2 chest tube placement on 5/18, had to be placed back on mechanical ventilation after surgery, central access was replaced on 5/19, the patient treated  in ICU with eventual chest tube removal with assistance of thoracic surgery and was deescalated in terms of respiratory support down to trach collar.  [place] in need of extensive rehabilitation, question of LTAC level    Interval Problem Update  Patient seen and examined today.    Patient tolerating treatment and therapies.  All Data, Medication data reviewed.  Case discussed with nursing as available.  Plan of Care reviewed with patient and notified of changes.  5/26 the patient on T-piece, 30% FiO2, no increase in work of breathing, small amount of secretions, good cough, chest x-ray improving, chest surgeon removed both chest tubes, ongoing antibiotic treatment with assistance of [facility] infectious disease.  Transfer options being evaluated.  Afebrile, tachycardic with heart rate in the 1 teens, blood pressure is soft but maintaining MAP.  Laboratory data reviewed, electrolytes are balanced, no leukocytosis, improving hemoglobin, chest x-ray with stable lines and tubes, stable ill-defined bilateral pulmonary opacities, no large effusions  5/27 the patient appears better, she is ambulating, the patient was cleared for diet, the patient is awake alert and following, afebrile, blood pressure between 90 and 130, no increase in respiratory difficulty, afebrile, ongoing antibiotic treatment through 6/14  Ongoing rehab efforts, awaiting LTAC approval  Consultants/Specialty  ID  Thoracic surgery  Pulmonary critical care  Code Status  Full Code    Disposition  Rehab, hopefully LTAC    Review of Systems  Review of Systems   Unable to perform ROS: Other   Poor communication secondary to tracheostomy    Physical Exam  Temp:  [36 °C (96.8 °F)-36.3 °C (97.3 °F)] 36.2 °C (97.1 °F)  Pulse:  [] 90  Resp:  [14-21] 18  BP: ()/(49-80) 129/74  SpO2:  [93 %-97 %] 97 %    Physical Exam  Vitals reviewed.   Constitutional:       General: She is not in acute distress.     Appearance: She is well-developed. She is ill-appearing.  She is not diaphoretic.   HENT:      Head: Normocephalic and atraumatic.   Neck:      Vascular: No JVD.   Cardiovascular:      Rate and Rhythm: Regular rhythm. Tachycardia present.   Pulmonary:      Effort: Pulmonary effort is normal. No respiratory distress.      Breath sounds: No stridor. Rhonchi and rales present. No wheezing.      Comments: Trach without inflammation  Abdominal:      Palpations: Abdomen is soft.      Tenderness: There is no abdominal tenderness. There is no guarding or rebound.   Skin:     General: Skin is warm and dry.      Coloration: Skin is pale.      Findings: No rash.   Neurological:      Mental Status: She is alert and oriented to person, place, and time.      Motor: Weakness present.   Psychiatric:         Thought Content: Thought content normal.         Fluids    Intake/Output Summary (Last 24 hours) at 5/27/2021 1536  Last data filed at 5/27/2021 0734  Gross per 24 hour   Intake 420 ml   Output 1350 ml   Net -930 ml       Laboratory  Recent Labs     05/25/21  0116 05/26/21  0220 05/27/21  0507   WBC 8.6 8.8 6.1   RBC 3.22* 3.40* 3.28*   HEMOGLOBIN 9.2* 9.6* 9.5*   HEMATOCRIT 29.9* 31.6* 30.0*   MCV 92.9 92.9 91.5   MCH 28.6 28.2 29.0   MCHC 30.8* 30.4* 31.7*   RDW 59.2* 58.5* 57.7*   PLATELETCT 426 447* 419   MPV 9.2 9.3 9.1     Recent Labs     05/25/21  0116 05/26/21  0220 05/27/21  0507   SODIUM 136 136 136   POTASSIUM 3.8 3.8 3.8   CHLORIDE 102 102 101   CO2 27 27 26   GLUCOSE 96 111* 88   BUN 11 14 8   CREATININE <0.17* <0.17* 0.17*   CALCIUM 8.5 9.0 9.0                   Imaging  DX-CHEST-PORTABLE (1 VIEW)   Final Result         1. Stable lines and tubes.   2. Stable ill-defined bilateral pulmonary opacities, right worse than left. No large pleural effusions.         DX-CHEST-PORTABLE (1 VIEW)   Final Result      1.  Decrease in volume of right pleural fluid collection/empyema with 2 right chest tubes in place. No pneumothorax identified.      2.  No focal consolidation in the  left lung.      DX-CHEST-PORTABLE (1 VIEW)   Final Result      No significant interval change.      DX-CHEST-PORTABLE (1 VIEW)   Final Result         1.  Hazy right pulmonary infiltrates and/or effusion, stable compared to prior study.      DX-CHEST-PORTABLE (1 VIEW)   Final Result         1.  Hazy right pulmonary infiltrates and/or effusion, stable compared to prior study.      DX-CHEST-PORTABLE (1 VIEW)   Final Result      1.  All lines and tubes appear appropriately located      2.  Consolidation throughout the right lung consistent with pneumonia      DX-CHEST-PORTABLE (1 VIEW)   Final Result         1.  Hazy right pulmonary infiltrates and/or effusion, increased compared to prior study.      DX-ABDOMEN FOR TUBE PLACEMENT   Final Result      Feeding tube looped in the stomach.      DX-CHEST-PORTABLE (1 VIEW)   Final Result         1. Stable lines and tubes. No pneumothorax detected.   2. Persistent opacities in the right perihilar region and in the right lung periphery, similar to prior study. Left lung is essentially clear.      DX-CHEST-PORTABLE (1 VIEW)   Final Result      Postoperative changes of the right chest with placement of an additional chest tube. There is improved aeration in the right lung. No significant pleural effusion or definite pneumothorax.      Right IJ central venous catheter tip projects over the proximal right atrium.      Hypoinflation with interstitial and hazy pulmonary opacities.      DX-CHEST-PORTABLE (1 VIEW)   Final Result         1.  Hazy right pulmonary infiltrates and/or effusion, similar compared to prior study.      DX-ABDOMEN FOR TUBE PLACEMENT   Final Result      Feeding tube tip projects over the expected location the gastric antrum.      DX-CHEST-PORTABLE (1 VIEW)   Final Result         1.  Hazy right pulmonary infiltrates and/or effusion, similar compared to prior study.      DX-CHEST-LIMITED (1 VIEW)   Final Result      1.  No pneumothorax. Only one chest tube is now  seen in the right lung.   2.  The chest is otherwise stable.      DX-CHEST-PORTABLE (1 VIEW)   Final Result         1.  Hazy right pulmonary infiltrates and/or effusion, similar compared to prior study.      DX-CHEST-PORTABLE (1 VIEW)   Final Result         1.  Hazy right pulmonary infiltrates and/or effusion, similar compared to prior study.      DX-ABDOMEN FOR TUBE PLACEMENT   Final Result         1.  Nonspecific bowel gas pattern.   2.  Dobbhoff tube tip terminates overlying the expected location of the gastric antrum.   3.  Hazy bilateral lung base infiltrates, greater on the right.      DX-CHEST-PORTABLE (1 VIEW)   Final Result         1.  Hazy right pulmonary infiltrates and/or effusion, similar compared to prior study.      US-RUQ   Final Result         1.  Hepatomegaly   2.  Mild intrahepatic and extrahepatic biliary ductal dilatation, commonly associated with postcholecystectomy physiology, consider causes of biliary obstruction with additional workup as clinically appropriate      CT-CHEST,ABDOMEN,PELVIS WITH   Final Result      1.  Large empyema in the RIGHT hemithorax, slightly decreased in size from prior exam with chest tubes present.   2.  Consolidation remains.   3.  Multiple cavitary lesions again seen in the LEFT upper lobe, minimally decreased from prior, concerning for septic emboli.   4.  Supportive tubing is unchanged.   5.  Intrahepatic and extrahepatic biliary dilation, likely postoperative although distal stricture, stone or mass remain possibilities.   6.  Moderate pelvic fluid, nonspecific.   7.  No evidence of bowel obstruction or perforation.      DX-CHEST-PORTABLE (1 VIEW)   Final Result         1.  Hazy right pulmonary infiltrates and/or effusion, similar compared to prior study.      DX-CHEST-PORTABLE (1 VIEW)   Final Result         No significant interval change.            DX-CHEST-PORTABLE (1 VIEW)   Final Result         Interval removal of left central venous catheter. Interval  adjustment of right PICC with tip in the SVC.         DX-ABDOMEN FOR TUBE PLACEMENT   Final Result      Feeding tube tip at the mid to distal stomach.      IR-PICC LINE PLACEMENT W/ GUIDANCE > AGE 5   Final Result                  Ultrasound-guided PICC placement performed by qualified nursing staff as    above.          DX-CHEST-PORTABLE (1 VIEW)   Final Result      1.  Stable cardiopulmonary findings.   2.  See comments above regarding the right-sided PICC position.      DX-CHEST-PORTABLE (1 VIEW)   Final Result      Stable chest findings compared to 5/5.      DX-CHEST-PORTABLE (1 VIEW)   Final Result      Stable chest findings compared with 5/3.      DX-ABDOMEN FOR TUBE PLACEMENT   Final Result         Feeding tube with tip projecting over the expected area of the stomach.      DX-CHEST-PORTABLE (1 VIEW)   Final Result      Stable chest findings compared with prior.      DX-CHEST-PORTABLE (1 VIEW)   Final Result         1. No significant interval change.      US-EXTREMITY ARTERY LOWER UNILAT RIGHT   Final Result      DX-CHEST-PORTABLE (1 VIEW)   Final Result         1. No significant interval change.      IR-PICC LINE PLACEMENT W/ GUIDANCE > AGE 5   Final Result                  Ultrasound-guided PICC placement performed by qualified nursing staff as    above.          DX-CHEST-PORTABLE (1 VIEW)   Final Result         1.  Pulmonary edema and/or infiltrates, similar to prior study.   2.  Stable opacification right lung, likely effusion. Thoracostomy tubes are in place.   3.  Multiple cavitary appearing left pulmonary lesions, see CT performed April 27, 2021 for further characterization      DX-CHEST-PORTABLE (1 VIEW)   Final Result         1.  Pulmonary edema and/or infiltrates, similar to prior study.   2.  Single opacification right lung, likely effusion. Thoracostomy tubes are in place.   3.  Multiple cavitary appearing left pulmonary lesions, see CT performed April 27, 2021 for further characterization   4.   Soft tissue gas in the right chest wall      DX-CHEST-PORTABLE (1 VIEW)   Final Result         1.  Pulmonary edema and/or infiltrates, similar to prior study.   2.  Increased opacification right lung, likely increased effusion. Thoracostomy tubes are in place.   3.  Multiple cavitary appearing left pulmonary lesions, see CT performed April 27, 2021 for further characterization   4.  Soft tissue gas in the right chest wall      DX-CHEST-PORTABLE (1 VIEW)   Final Result         1.  Pulmonary edema and/or infiltrates, similar to prior study.   2.  Right pneumothorax, stable compared to prior study, right thoracostomy tubes remain in place   3.  Multiple cavitary appearing left pulmonary lesions, see CT performed April 27, 2021 for further characterization   4.  Soft tissue gas in the right chest wall      DX-CHEST-PORTABLE (1 VIEW)   Final Result         1.  Pulmonary edema and/or infiltrates, similar to prior study.   2.  Right pneumothorax, interval decreased compared to prior study, interval placement of right thoracostomy tubes is noted.   3.  Multiple cavitary appearing left pulmonary lesions, see CT performed yesterday for further characterization   4.  Soft tissue gas in the right chest wall      DX-CHEST-PORTABLE (1 VIEW)   Final Result         1.  Pulmonary edema and/or infiltrates, similar to prior study.   2.  Right pneumothorax, stable compared to prior study, interval removal of right thoracostomy tube is noted.   3.  Multiple cavitary appearing left pulmonary lesions, see CT performed yesterday for further characterization   4.  Soft tissue gas in the right chest wall      DX-CHEST-LIMITED (1 VIEW)   Final Result         No significant interval change.      CT-CTA CHEST PULMONARY ARTERY W/ RECONS   Final Result         No CT evidence of pulmonary embolus.      Previous right chest tube were removed.      Grossly similar in size of the loculated right hydropneumothorax but there is increased layering fluid  component. Unchanged mild shift of the mediastinal to the left.      Redemonstration of a pigtail catheter in the medial left lung base. Grossly similar multiple cavitary lesions in the left lung.      US-EXTREMITY VENOUS LOWER BILAT   Final Result      POCT BUTTERFLY-DUPLEX SCAN EXTREMITY VEINS LIMITED   Final Result      DX-CHEST-PORTABLE (1 VIEW)   Final Result         1.  Pulmonary edema and/or infiltrates, similar to prior study.   2.  Right pneumothorax, somewhat decreased to prior study, interval removal of right thoracostomy tube is noted.   3.  Multiple cavitary appearing left pulmonary lesions, see CT performed yesterday for further characterization   4.  Soft tissue gas in the right chest wall      DX-CHEST-PORTABLE (1 VIEW)   Final Result         1.  Pulmonary edema and/or infiltrates, similar to prior study.   2.  Right pneumothorax, similar to prior study with thoracostomy tube in place.   3.  Multiple cavitary appearing left pulmonary lesions, see CT performed yesterday for further characterization      CT-CHEST (THORAX) W/O   Final Result      Large loculated right hydropneumothorax with interval increase in size of the pneumothorax and pleural fluid.      Right chest tube is in the posterior right thorax.      There is mediastinal shift to the left compatible with tension.      Small pericardial effusion.      Small loculated left pleural effusion with overlying atelectasis/consolidation.   Pigtail catheter is seen at the medial left lung base. Pleural effusion has decreased in size compared to prior.      There are multiple foci of air extending from the right lung to the pleural space suspicious for a bronchopleural fistula.      Multiple cavitary lesions bilaterally with mild interval decrease of the solid component of the cavitary nodules.      Noncavitary 3.1 cm masslike density is seen at the left lung base.      Findings may be related to septic emboli, malignancy or multifocal abscesses.  Short interval follow-up recommended.      Soft tissue air on the right is again seen.      Splenomegaly      Findings discussed with Leti Sun.      DX-CHEST-PORTABLE (1 VIEW)   Final Result      No significant change from prior exam.      CT-HEAD W/O   Final Result      1.  RIGHT frontal ventriculostomy catheter unchanged in position.   2.  Mild cortical atrophy.   3.  No intracranial hemorrhage.   4.  Apparent dural thickening overlying the clivus.  Consider further evaluation with contrast-enhanced MRI.      DX-CHEST-LIMITED (1 VIEW)   Final Result      Interval left basilar pigtail catheter with diminished atelectasis, pleural fluid      Stable right hydropneumothorax with thoracostomy tube in place, considerable soft tissue gas and partial lung collapse      IR-CHEST TUBE-EMPYEMA LEFT   Final Result      1.  CT GUIDED LEFT  10 [place] PIGTAIL CHEST TUBE PLACEMENT FOR POSSIBLE EMPYEMA YIELDING OLD BLOODY FLUID.      2. A CHEST RADIOGRAPH IS FORTHCOMING.      EC-MICAH W/O CONT   Final Result      US-ABDOMEN LTD (SOFT TISSUE)   Final Result      No fluid seen surrounding the electronic implanted spinal stimulator device.      DX-CHEST-LIMITED (1 VIEW)   Final Result      1.  Large right hydropneumothorax with right-sided chest tube in place, likely stable to slightly decreased from prior study.   2.  Small left pleural effusion. Mild improved aeration in the left lung.   3.  Bilateral pulmonary opacities which could be related to combination of atelectasis, edema and/or infection..      CT-CTA HEAD WITH & W/O-POST PROCESS   Final Result      1.  Patent carotid and vertebral arteries.      2.  Again seen right frontal the jugular peritoneal shunt catheter. There is mild increase in volume of the lateral and third ventricles.      CT-CHEST (THORAX) W/O   Final Result      1.  Interval placement of a right-sided chest tube into a large loculated right-sided pleural effusion. There is now seen a large right-sided  hydropneumothorax in the area that previously seen fluid. The chest tube extends into that hydropneumothorax.    There is some residual pleural fluid seen as well. A hydropneumothorax is somewhat loculated with components most prominently inferiorly and medially.      2.  New loculated left pleural effusion.      3.  Again seen multiple bilateral cavitary pulmonary masses which are more prominent than previously seen with increasing cavitation and measure up to 3 cm size. Differential diagnosis includes septic emboli, multifocal abscesses and neoplasm.      4.  Large amount of subcutaneous emphysema on the right.      5.  Splenomegaly.      6.  Multiple support devices.      Fleischner Society pulmonary nodule recommendations:         DX-CHEST-LIMITED (1 VIEW)   Final Result      1.  Support devices as described above.      2.  Right chest tube present with a again seen right-sided pneumothorax, possibly increased in size and loculated.      3.  Worsening bilateral pulmonary infiltrates.      DX-ABDOMEN FOR TUBE PLACEMENT   Final Result      Cortrak feeding tube tip projects in the region of the duodenal bulb.      DX-CHEST-LIMITED (1 VIEW)   Final Result      Loculated right pneumothorax is decreased in size compared to prior.      Small left pleural effusion.      Small loculated right pleural effusion.      Extensive bilateral airspace opacities are not significantly changed.      Soft tissue air on the right is again noted.      DX-CHEST-LIMITED (1 VIEW)   Final Result      Decreased partially loculated right pleural fluid with increased pleural air. Right chest tube is in place.      Increased hazy opacity in the left lung possibly atelectasis.         US-ABDOMEN LTD (SOFT TISSUE)   Final Result      No drainable fluid collection.      DX-CHEST-PORTABLE (1 VIEW)   Final Result      Decreased partially loculated right pleural fluid with increased pleural air. Right chest tube is in place.      No other  significant change.      DX-CHEST-PORTABLE (1 VIEW)   Final Result         1.  Pulmonary edema and/or infiltrates are identified, which are somewhat decreased since the prior exam.   2.  Moderate loculated appearing right pleural effusion, slightly decreased since prior      DX-ABDOMEN FOR TUBE PLACEMENT   Final Result      Feeding tube tip at the distal stomach.      DX-CHEST-PORTABLE (1 VIEW)   Final Result         1.  New chest tube is noted in the right lower hemithorax.      2.  Right pleural effusion is again identified which appears similar to the prior exam.      3.  No new infiltrates or consolidations are identified.      DX-CHEST-PORTABLE (1 VIEW)   Final Result         1.  Pulmonary edema and/or infiltrates are identified, which are stable since the prior exam.   2.  Moderate loculated appearing right pleural effusion      DX-CHEST-PORTABLE (1 VIEW)   Final Result         No significant interval change.      POCT BUTTERFLY-ECHOCARDIOGRAM LTD W/O CONT    (Results Pending)        Assessment/Plan  * Empyema of right pleural space (HCC)- (present on admission)  Assessment & Plan  Patient s/p multiple chest tubes, thoracotomy and decortication  Now s/p second decortication 5/17      Debility  Assessment & Plan  Acute on chronic, the patient likely need of LTAC treatment post acute treatment    Elevated alkaline phosphatase level- (present on admission)  Assessment & Plan  The patient not able to tolerate a MRCP at this time, ultrasound grossly unremarkable, observe    Agitation requiring sedation protocol- (present on admission)  Assessment & Plan  Monitor,    Goals of care, counseling/discussion  Assessment & Plan  Patient with overall high risk of morbidity and mortality given her gravely ill state and prior chronic medical conditions  Palliative care assisting    Spinal cord stimulator status  Assessment & Plan  Patient's stimulator is Nuvectra. It is FDA approved as MRI compatible, but the company has  gone out of business, so there is no support team to assist with MRI.  Thus, if MRI were performed, our radiologists would need to protocol it correctly to manage the stimulator. The former rep from [facility] is Andre, [phone].  Information obtained from Dr. Mahoney's office, [phone].     Per  (5/4/2021), it is not MRI compatible unfortunately.     Thrombocytosis (HCC)  Assessment & Plan  Acute reaction since resolved    Anasarca- (present on admission)  Assessment & Plan  Improved nutritional status, skin protective measures    Trapped lung  Assessment & Plan  Now s/p decortication   Chest tubes removed on 5/26, observe    Pulmonary abscess (HCC)  Assessment & Plan  From septic emboli  Now s/p decortication    Fever  Assessment & Plan  Resolved, monitor, antibiotic therapy    Atelectasis  Assessment & Plan  Pulmonary recruiting, respiratory care    Pneumonia  Assessment & Plan  MSSA  Ancef per ID    History of vasculitis- (present on admission)  Assessment & Plan  Does not appear to be a current issue    Bacteremia due to methicillin susceptible Staphylococcus aureus (MSSA)- (present on admission)  Assessment & Plan  Infectious disease assisting with antibiotic treatment  Multiple potential sources  Endocarditis  ID following    CSF pleocytosis- (present on admission)  Assessment & Plan  Infectious disease managing    Acute bacterial endocarditis- (present on admission)  Assessment & Plan  Mitral valve, tricuspid valve  MSSA  Ongoing antibiotic therapy for acute infection and 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    Acute blood loss anemia  Assessment & Plan  Likely secondary to hemothorax  300 cc of blood removed after chest tube placed at [facility]  Trend hemoglobin    Prolonged Q-T interval on ECG- (present on admission)  Assessment & Plan  Resolved after correcting metabolic issues  Cont to follow   Monitor electrolytes, acidosis etc    Acute respiratory failure with hypoxia  (HCC)- (present on admission)  Assessment & Plan  Secondary to MSSA bacteremia, empyema, pulmonary septic emboli  S/p tracheostomy  Ongoing respiratory care, pulmonary toilet, antibiotic treatment  Prolonged ventilator dependence    History of complicated migraines- (present on admission)  Assessment & Plan  Monitor    GERD (gastroesophageal reflux disease)- (present on admission)  Assessment & Plan  History of, as needed treatment    Hyponatremia  Assessment & Plan  Resolved  Follow daily    Tachycardia  Assessment & Plan  Persistent, likely secondary with multiple underlying ongoing conditions  Monitor    Pseudotumor cerebri  Assessment & Plan  History of  shunt, neurosurgery opted against removal    H/O: CVA (cerebrovascular accident)- (present on admission)  Assessment & Plan  Monitor    Chronic pain syndrome- (present on admission)  Assessment & Plan  Pre-existing, pain management    Port or reservoir infection  Assessment & Plan  S/p removal    Thrombocytopenia (HCC)- (present on admission)  Assessment & Plan  Transient, resolved    Anemia- (present on admission)  Assessment & Plan  Monitor, transfuse as indicated    Septic shock (HCC)- (present on admission)  Assessment & Plan  Multiple sources, recovered with aggressive treatment, IV antibiotics long-term    Sepsis ([facility])  Assessment & Plan  Multiple sources possible, recovered with medical treatment  Now on long term treatment of MSSA bacteremia   [facility] Infectious Disease following    Hypomagnesemia  Assessment & Plan  Monitor, replace and recheck    Hypokalemia  Assessment & Plan  Monitor, replace and recheck    Anxiety- (present on admission)  Assessment & Plan  Likely acute on chronic  Prn management  SSRI    S/P  shunt- (present on admission)  Assessment & Plan  History of, Dr. Oh felt there is currently no need to remove    Acute encephalopathy- (present on admission)  Assessment & Plan  Per neurosurgery no need to remove  shunt  Improving  with ongoing medical treatment       Plan  Continue with antibiotic therapy  Multimodality pain and behavioral control  Rehabilitation, PT OT  Possible LTAC/[facility]  Monitor electrolytes closely and replace as indicated  Increase mobility  Pulmonary toilet  See orders  Medically complex high risk patient    VTE prophylaxis: enoxaparin    I have performed a physical exam and reviewed and updated ROS and Plan today . In review of yesterday's note , there are no changes except as documented above.        Please note that this dictation was created using voice recognition software. I have made every reasonable attempt to correct obvious errors, but I expect that there are errors of grammar and possibly context that I did not discover before finalizing the note.

## 2021-05-27 NOTE — THERAPY
Physical Therapy   Daily Treatment     Patient Name: Enedelia Pang  Age:  50 y.o., Sex:  female  Medical Record #: [id number]  Today's Date: 5/26/2021     Precautions:  Fall Risk, Nasogastric Tube, Tracheostomy, Portable wound vac    Assessment  Pt with good progress towards therapy goals with decreased assistance required to complete mobility/transfers/gait and able to ambulate for greater distances with improved management of FWW. Vitals WNL throughout session with mild tachycardia at end of session upon reentering bed reclined with potential artifact (Nsg notifed) and patient with no signs/symptoms of distress or adverse event. Observed R head/neck lateral flexion at rest that she is able to correct with cueing and reports no pain with movement. Pt still limited by anxiety during bed mobility with concerns of HOB flat upon reentering bed d/t difficulty breathing. Also limited by fatigue and generalized weakness. Still at risk for falls, injury and functional decline and would continue to benefit from skilled PT. Will continue to progress POC as tolerated.     Plan    Continue current treatment plan.    DC Equipment Recommendations:  Unable to determine at this time  Discharge Recommendations:  Recommend post-acute placement for additional physical therapy services prior to discharge home      Subjective  Pt pleasant and agreeable to PT session. Anxious about laying flat and asking for raised HOB with bed mobility in/out of bed.      Objective  Bed mobility SPV except sit<>supine HALEIGH with HOB raised. STS HALEIGH. Gait Close SPV with FWW for 250 feet one standing rest break no overt LOB      05/26/21 1716   Total Time Spent   Total Time Spent (Mins) 35   Charge Group   Charges  Yes   PT Gait Training 1   PT Therapeutic Activities 1   Precautions   Precautions Fall Risk;Nasogastric Tube;Tracheostomy    Comments Portable wound vac   Vitals   Vitals Comments Within safe limits throughout session. Mild tachy HR:130s when  returned to bed. Asymptomatic. Nsg aware   Pain 0 - 10 Group   Therapist Pain Assessment Post Activity Pain Same as Prior to Activity;0   Non Verbal Descriptors   Non Verbal Scale  Calm;Unlabored Breathing   Cognition    Cognition / Consciousness WDL   Level of Consciousness Alert   Balance   Sitting Balance (Static) Fair +   Sitting Balance (Dynamic) Fair   Standing Balance (Static) Fair -   Standing Balance (Dynamic) Fair -   Weight Shift Sitting Fair   Weight Shift Standing Fair   Comments FWW, cueing for upright head/neck (slight R lateral flexion)   Gait Analysis   Gait Level Of Assist Supervised  (Close SPV)   Assistive Device Front Wheel Walker   Distance (Feet) 250  (Small lap around unit)   # of Times Distance was Traveled 1   Deviation Other (Comment)  (short stride, one standing rest break)   # of Stairs Climbed 0   Comments Cues for upright head/neck, on trach collar    Bed Mobility    Supine to Sit Supervised  (raised HOB)   Sit to Supine Minimal Assist  (LE support)   Scooting Supervised   Rolling Supervised   Comments Immediate raised HOB upon sit,>supine per pt request   Functional Mobility   Sit to Stand Minimal Assist   Transfer Method Stand Step   Mobility Room, hallway with FWW   Comments FWW   How much difficulty does the patient currently have...   Turning over in bed (including adjusting bedclothes, sheets and blankets)? 3   Sitting down on and standing up from a chair with arms (e.g., wheelchair, bedside commode, etc.) 3   Moving from lying on back to sitting on the side of the bed? 3   How much help from another person does the patient currently need...   Moving to and from a bed to a chair (including a wheelchair)? 3   Need to walk in a hospital room? 3   Climbing 3-5 steps with a railing? 3   6 clicks Mobility Score 18   Activity Tolerance   Sitting Edge of Bed 10   Standing 10   Short Term Goals    Short Term Goal # 1 Pt will be able to perform supine<>sit with HOB flat/no rails with Spv  in 6tx to promote fnx progression towards  I    Goal Outcome # 1 goal not met  (anxious with HOB flat)   Short Term Goal # 2 Pt will be able to perform sit<>stand/transfers with FWW with SPv in 6tx to promote fnx progression towards I    Goal Outcome # 2 Progressing as expected   Short Term Goal # 3 Pt will be able to ambulate 150ft with FWW iwth SPv in 6tx to promote fnx progression towards I    Goal Outcome # 3 Goal met   Short Term Goal # 4 Pt will be able to ambulate up/down FOS with rail wtih Spv in 6tx to promote eventual dc to home plan   Goal Outcome # 4 Goal not met   Education Group   Education Provided Role of Physical Therapist   Role of Physical Therapist Patient Response Patient;Acceptance;Explanation;Verbal Demonstration   Anticipated Discharge Equipment and Recommendations   DC Equipment Recommendations Unable to determine at this time   Discharge Recommendations Recommend post-acute placement for additional physical therapy services prior to discharge home   Interdisciplinary Plan of Care Collaboration   IDT Collaboration with  Nursing   Patient Position at End of Therapy In Bed;Bed Alarm On;Call Light within Reach;Tray Table within Reach;Phone within Reach;Family / Friend in Room   Collaboration Comments RN updated on status and position   Session Information   Date / Session Number  5/26-3(2/3, 5/26)

## 2021-05-28 LAB
ANION GAP SERPL CALC-SCNC: 11 MMOL/L (ref 7–16)
BUN SERPL-MCNC: 5 MG/DL (ref 8–22)
CALCIUM SERPL-MCNC: 9.3 MG/DL (ref 8.5–10.5)
CHLORIDE SERPL-SCNC: 100 MMOL/L (ref 96–112)
CO2 SERPL-SCNC: 27 MMOL/L (ref 20–33)
CREAT SERPL-MCNC: 0.22 MG/DL (ref 0.5–1.4)
ERYTHROCYTE [DISTWIDTH] IN BLOOD BY AUTOMATED COUNT: 57.4 FL (ref 35.9–50)
GLUCOSE SERPL-MCNC: 90 MG/DL (ref 65–99)
HCT VFR BLD AUTO: 32.9 % (ref 37–47)
HGB BLD-MCNC: 10.1 G/DL (ref 12–16)
MCH RBC QN AUTO: 28.2 PG (ref 27–33)
MCHC RBC AUTO-ENTMCNC: 30.7 G/DL (ref 33.6–35)
MCV RBC AUTO: 91.9 FL (ref 81.4–97.8)
PLATELET # BLD AUTO: 438 K/UL (ref 164–446)
PMV BLD AUTO: 9.1 FL (ref 9–12.9)
POTASSIUM SERPL-SCNC: 4 MMOL/L (ref 3.6–5.5)
RBC # BLD AUTO: 3.58 M/UL (ref 4.2–5.4)
SODIUM SERPL-SCNC: 138 MMOL/L (ref 135–145)
WBC # BLD AUTO: 6.6 K/UL (ref 4.8–10.8)

## 2021-05-28 PROCEDURE — 770020 HCHG ROOM/CARE - TELE (206)

## 2021-05-28 PROCEDURE — 97535 SELF CARE MNGMENT TRAINING: CPT

## 2021-05-28 PROCEDURE — A9270 NON-COVERED ITEM OR SERVICE: HCPCS | Performed by: INTERNAL MEDICINE

## 2021-05-28 PROCEDURE — 97116 GAIT TRAINING THERAPY: CPT | Mod: CQ

## 2021-05-28 PROCEDURE — 700111 HCHG RX REV CODE 636 W/ 250 OVERRIDE (IP): Performed by: INTERNAL MEDICINE

## 2021-05-28 PROCEDURE — 97530 THERAPEUTIC ACTIVITIES: CPT | Mod: CQ

## 2021-05-28 PROCEDURE — 99232 SBSQ HOSP IP/OBS MODERATE 35: CPT | Performed by: HOSPITALIST

## 2021-05-28 PROCEDURE — 94760 N-INVAS EAR/PLS OXIMETRY 1: CPT

## 2021-05-28 PROCEDURE — 700102 HCHG RX REV CODE 250 W/ 637 OVERRIDE(OP): Performed by: INTERNAL MEDICINE

## 2021-05-28 PROCEDURE — 700102 HCHG RX REV CODE 250 W/ 637 OVERRIDE(OP): Performed by: HOSPITALIST

## 2021-05-28 PROCEDURE — A9270 NON-COVERED ITEM OR SERVICE: HCPCS | Performed by: HOSPITALIST

## 2021-05-28 PROCEDURE — 94640 AIRWAY INHALATION TREATMENT: CPT

## 2021-05-28 PROCEDURE — 80048 BASIC METABOLIC PNL TOTAL CA: CPT

## 2021-05-28 PROCEDURE — 85027 COMPLETE CBC AUTOMATED: CPT

## 2021-05-28 RX ORDER — ACETAMINOPHEN 325 MG/1
650 TABLET ORAL EVERY 4 HOURS PRN
Status: DISCONTINUED | OUTPATIENT
Start: 2021-05-28 | End: 2021-06-18 | Stop reason: HOSPADM

## 2021-05-28 RX ORDER — DIVALPROEX SODIUM 125 MG/1
500 CAPSULE, COATED PELLETS ORAL EVERY 8 HOURS
Status: DISCONTINUED | OUTPATIENT
Start: 2021-05-28 | End: 2021-06-18 | Stop reason: HOSPADM

## 2021-05-28 RX ORDER — RISPERIDONE 2 MG/1
2 TABLET ORAL 2 TIMES DAILY
Status: DISCONTINUED | OUTPATIENT
Start: 2021-05-28 | End: 2021-06-18 | Stop reason: HOSPADM

## 2021-05-28 RX ORDER — GABAPENTIN 300 MG/1
300 CAPSULE ORAL EVERY 8 HOURS
Status: DISCONTINUED | OUTPATIENT
Start: 2021-05-28 | End: 2021-06-18 | Stop reason: HOSPADM

## 2021-05-28 RX ORDER — PAROXETINE 10 MG/1
10 TABLET, FILM COATED ORAL EVERY MORNING
Status: DISCONTINUED | OUTPATIENT
Start: 2021-05-29 | End: 2021-06-18 | Stop reason: HOSPADM

## 2021-05-28 RX ORDER — ACETAMINOPHEN 500 MG
500 TABLET ORAL 3 TIMES DAILY
Status: DISCONTINUED | OUTPATIENT
Start: 2021-05-28 | End: 2021-06-18 | Stop reason: HOSPADM

## 2021-05-28 RX ORDER — AMOXICILLIN 250 MG
2 CAPSULE ORAL 2 TIMES DAILY
Status: DISCONTINUED | OUTPATIENT
Start: 2021-05-28 | End: 2021-06-18 | Stop reason: HOSPADM

## 2021-05-28 RX ORDER — DIAZEPAM 5 MG/1
5 TABLET ORAL
Status: DISCONTINUED | OUTPATIENT
Start: 2021-05-28 | End: 2021-06-18 | Stop reason: HOSPADM

## 2021-05-28 RX ORDER — DULOXETIN HYDROCHLORIDE 30 MG/1
60 CAPSULE, DELAYED RELEASE ORAL 2 TIMES DAILY
Status: DISCONTINUED | OUTPATIENT
Start: 2021-05-28 | End: 2021-06-18 | Stop reason: HOSPADM

## 2021-05-28 RX ORDER — BISACODYL 10 MG
10 SUPPOSITORY, RECTAL RECTAL
Status: DISCONTINUED | OUTPATIENT
Start: 2021-05-28 | End: 2021-06-18 | Stop reason: HOSPADM

## 2021-05-28 RX ORDER — POLYETHYLENE GLYCOL 3350 17 G/17G
1 POWDER, FOR SOLUTION ORAL
Status: DISCONTINUED | OUTPATIENT
Start: 2021-05-28 | End: 2021-06-18 | Stop reason: HOSPADM

## 2021-05-28 RX ORDER — OXYCODONE HYDROCHLORIDE 5 MG/1
5 TABLET ORAL EVERY 4 HOURS PRN
Status: DISCONTINUED | OUTPATIENT
Start: 2021-05-28 | End: 2021-06-18 | Stop reason: HOSPADM

## 2021-05-28 RX ORDER — METHADONE HYDROCHLORIDE 10 MG/1
30 TABLET ORAL DAILY
Status: DISCONTINUED | OUTPATIENT
Start: 2021-05-29 | End: 2021-06-18 | Stop reason: HOSPADM

## 2021-05-28 RX ADMIN — RISPERIDONE 2 MG: 2 TABLET ORAL at 17:10

## 2021-05-28 RX ADMIN — PAROXETINE HYDROCHLORIDE 10 MG: 20 TABLET, FILM COATED ORAL at 04:56

## 2021-05-28 RX ADMIN — DIVALPROEX SODIUM 500 MG: 125 CAPSULE ORAL at 04:56

## 2021-05-28 RX ADMIN — DIVALPROEX SODIUM 500 MG: 125 CAPSULE ORAL at 22:08

## 2021-05-28 RX ADMIN — DOCUSATE SODIUM 50 MG AND SENNOSIDES 8.6 MG 2 TABLET: 8.6; 5 TABLET, FILM COATED ORAL at 04:55

## 2021-05-28 RX ADMIN — CEFAZOLIN SODIUM 2 G: 2 INJECTION, SOLUTION INTRAVENOUS at 04:57

## 2021-05-28 RX ADMIN — GABAPENTIN 300 MG: 300 CAPSULE ORAL at 14:43

## 2021-05-28 RX ADMIN — DULOXETINE HYDROCHLORIDE 60 MG: 60 CAPSULE, DELAYED RELEASE ORAL at 04:56

## 2021-05-28 RX ADMIN — METHADONE HYDROCHLORIDE 30 MG: 10 TABLET ORAL at 04:55

## 2021-05-28 RX ADMIN — DIAZEPAM 5 MG: 5 TABLET ORAL at 22:06

## 2021-05-28 RX ADMIN — OXYCODONE 5 MG: 5 TABLET ORAL at 03:53

## 2021-05-28 RX ADMIN — GABAPENTIN 300 MG: 300 CAPSULE ORAL at 22:08

## 2021-05-28 RX ADMIN — ACETAMINOPHEN 500 MG: 325 TABLET, FILM COATED ORAL at 14:42

## 2021-05-28 RX ADMIN — OXYCODONE 5 MG: 5 TABLET ORAL at 19:45

## 2021-05-28 RX ADMIN — RISPERIDONE 2 MG: 1 TABLET, FILM COATED ORAL at 04:56

## 2021-05-28 RX ADMIN — CEFAZOLIN SODIUM 2 G: 2 INJECTION, SOLUTION INTRAVENOUS at 14:42

## 2021-05-28 RX ADMIN — ENOXAPARIN SODIUM 40 MG: 40 INJECTION SUBCUTANEOUS at 04:56

## 2021-05-28 RX ADMIN — OXYCODONE 5 MG: 5 TABLET ORAL at 09:43

## 2021-05-28 RX ADMIN — GABAPENTIN 300 MG: 300 CAPSULE ORAL at 04:56

## 2021-05-28 RX ADMIN — ACETAMINOPHEN 500 MG: 325 TABLET ORAL at 22:07

## 2021-05-28 RX ADMIN — DIVALPROEX SODIUM 500 MG: 125 CAPSULE ORAL at 14:42

## 2021-05-28 RX ADMIN — DULOXETINE HYDROCHLORIDE 60 MG: 60 CAPSULE, DELAYED RELEASE ORAL at 17:10

## 2021-05-28 RX ADMIN — ONDANSETRON 4 MG: 2 INJECTION INTRAMUSCULAR; INTRAVENOUS at 15:12

## 2021-05-28 RX ADMIN — CEFAZOLIN SODIUM 2 G: 2 INJECTION, SOLUTION INTRAVENOUS at 22:09

## 2021-05-28 RX ADMIN — ACETAMINOPHEN 500 MG: 325 TABLET, FILM COATED ORAL at 09:43

## 2021-05-28 ASSESSMENT — COGNITIVE AND FUNCTIONAL STATUS - GENERAL
SUGGESTED CMS G CODE MODIFIER DAILY ACTIVITY: CK
DRESSING REGULAR UPPER BODY CLOTHING: A LITTLE
SUGGESTED CMS G CODE MODIFIER DAILY ACTIVITY: CK
TOILETING: A LITTLE
DRESSING REGULAR UPPER BODY CLOTHING: A LITTLE
CLIMB 3 TO 5 STEPS WITH RAILING: A LITTLE
PERSONAL GROOMING: A LITTLE
HELP NEEDED FOR BATHING: A LITTLE
TURNING FROM BACK TO SIDE WHILE IN FLAT BAD: A LITTLE
MOVING TO AND FROM BED TO CHAIR: A LITTLE
DAILY ACTIVITIY SCORE: 17
DRESSING REGULAR LOWER BODY CLOTHING: A LITTLE
STANDING UP FROM CHAIR USING ARMS: A LITTLE
MOVING TO AND FROM BED TO CHAIR: A LITTLE
SUGGESTED CMS G CODE MODIFIER MOBILITY: CK
MOVING FROM LYING ON BACK TO SITTING ON SIDE OF FLAT BED: A LITTLE
DAILY ACTIVITIY SCORE: 16
MOVING FROM LYING ON BACK TO SITTING ON SIDE OF FLAT BED: A LITTLE
SUGGESTED CMS G CODE MODIFIER MOBILITY: CK
WALKING IN HOSPITAL ROOM: A LITTLE
CLIMB 3 TO 5 STEPS WITH RAILING: A LITTLE
DRESSING REGULAR LOWER BODY CLOTHING: A LITTLE
STANDING UP FROM CHAIR USING ARMS: A LITTLE
WALKING IN HOSPITAL ROOM: A LITTLE
MOBILITY SCORE: 18
EATING MEALS: TOTAL
PERSONAL GROOMING: A LITTLE
EATING MEALS: A LITTLE
HELP NEEDED FOR BATHING: A LOT
MOBILITY SCORE: 18
TURNING FROM BACK TO SIDE WHILE IN FLAT BAD: A LITTLE
TOILETING: A LITTLE

## 2021-05-28 ASSESSMENT — GAIT ASSESSMENTS
GAIT LEVEL OF ASSIST: SUPERVISED
DISTANCE (FEET): 250
ASSISTIVE DEVICE: FRONT WHEEL WALKER

## 2021-05-28 ASSESSMENT — PAIN DESCRIPTION - PAIN TYPE
TYPE: ACUTE PAIN
TYPE: ACUTE PAIN;SURGICAL PAIN
TYPE: ACUTE PAIN

## 2021-05-28 ASSESSMENT — FIBROSIS 4 INDEX: FIB4 SCORE: 0.41

## 2021-05-28 NOTE — THERAPY
Physical Therapy   Daily Treatment     Patient Name: Enedelia Pang  Age:  50 y.o., Sex:  female  Medical Record #: [id number]  Today's Date: 5/28/2021     Precautions: Fall Risk, Tracheostomy     Assessment    Pt was pleasant and motivated wanting to mobilize. Educated on ambulating with nursing staff and sitting in chair more frequently to increase mobility. As well educated on pacing with activity throughout the day. She completed bed mobility and STS with spv. Transfers with hands on for safety and vc to back up to feel chair and reach back for safety. She increased gait distance no standing rest breaks and consistent jacob and no lob. At this time pt continues to be below baseline and has stairs at home which deferred today due to fatigue. Will continue to follow while in house to progress mobility.     Plan    Continue current treatment plan.    DC Equipment Recommendations: Unable to determine at this time  Discharge Recommendations: Recommend post-acute placement for additional physical therapy services prior to discharge home         05/28/21 1425   Balance   Sitting Balance (Static) Fair +   Sitting Balance (Dynamic) Fair   Standing Balance (Static) Fair -   Standing Balance (Dynamic) Fair -   Weight Shift Sitting Fair   Weight Shift Standing Fair   Comments with fww, no lob at this time    Gait Analysis   Gait Level Of Assist Supervised   Assistive Device Front Wheel Walker   Distance (Feet) 250   # of Times Distance was Traveled 1   Skilled Intervention Verbal Cuing   Comments cues to keep fww closer, improved steady jacob, no lob    Bed Mobility    Supine to Sit Supervised   Sit to Supine   (left up in chair )   Scooting Supervised   Comments hob elevated but reached EOB with spv    Functional Mobility   Sit to Stand Supervised   Bed, Chair, Wheelchair Transfer Minimal Assist   Skilled Intervention Verbal Cuing   Comments Brenda for transfers with verbal cues for reaching back and slow and controlled  descend.    Short Term Goals    Short Term Goal # 1 Pt will be able to perform supine<>sit with HOB flat/no rails with Spv in 6tx to promote fnx progression towards  I    Goal Outcome # 1 Progressing as expected   Short Term Goal # 2 Pt will be able to perform sit<>stand/transfers with FWW with SPv in 6tx to promote fnx progression towards I    Goal Outcome # 2 Goal met   Short Term Goal # 3 Pt will be able to ambulate 150ft with FWW iwth SPv in 6tx to promote fnx progression towards I    Goal Outcome # 3 Goal met   Short Term Goal # 4 Pt will be able to ambulate up/down FOS with rail wtih Spv in 6tx to promote eventual dc to home plan  (pt deferred )   Goal Outcome # 4 Goal not met

## 2021-05-28 NOTE — DISCHARGE PLANNING
Anticipated Discharge Disposition: [facility]    Action: pt pending bed availability at [facility]    Barriers to Discharge: [facility] bed    Plan: f/u with [facility] for bed availability

## 2021-05-28 NOTE — PROGRESS NOTES
Infectious Disease Daily Progress Note    Date of Service  5/28/2021    Chief Complaint  Cefazolin 5/26-present.     Meropenem 5/19-5/26   Cefepime 4/23 5/18-  s/p 23 days  Cefazolin 5/18-5/19     Thoracoscopy & Decortication - 4/28  Decortication 5/17     PRIOR Rx:   Zosyn 4/22-4/23  Previous: Unasyn, Zosyn, Vanco, Daptomycin  Ceftaroline: start 4/13-4/15  Nafcillin 2g Q4 hrs 4/15-4/23 4/14: Unable to give name of previous NSG or neurologist. Seems confused, but able to say some sentences fluently.   4/15: Line cultures now growing MSSA. As well as from the blood. Repeat blood culture 4/13+ in both sets. Patient has worsening confusion. CSF fluid analyses suggest pleocytosis. Cultures are no growth from the CSF. Chest CT was ordered this morning indicating a loculated right pleural effusion as well as bilateral septic emboli. Dr. Mack spoke with Dr. Oh yesterday and no surgical intervention unless clinical deterioration.  4/16: Increased respiratory distress.  Tachypneic.  CT with loculated collection.  Dr Resendiz not available.  No thoracic surgeon available.  Plans to have pulmonary place CT.  Transferring to ICU.  4/17: Transferred to [facility] last night. Hgb dropped to 6.4 requiring PRBC transfusion. Requiring 2 pressors. Fio2 50%. Febrile 38.8. Pending OR decortication of empyema and hemothorax. [facility] at [facility] shows 8,371 WBC, ,000, 74% N  4/18: Chest tube was upsized by surgery yesterday, no decortication. WBC 22k. Fio2 40%. Vasopressin. Levophed down to 2mcg. Afebrile 24 hrs. Last fever 38.6 on 4/16.   4/19: Still on vent. Levo 3 mcg, vasopressin. Afebrile 72 hours. WBC 21k. BC 4/17 NGTD x 48 hours. Unable to do MRI brain given neurostimulator per RN.   4/20: Remaining afebrile. Hb 6.2 and undergoing transfusion today.WBC 24,100, 5% bands.1700 ml CT output. Copious bloody ET secretions. No pressors. Receiving dornase and TPA per CT. Right pleural effusion not large per CXR  today.   4/21: WBC 31k. Bronchoscopy yesterday showing blood. Cultures sent. TPA on hold per RN due to arvind blood from chest tube requiring PRBC transfusion for hgb 6. Pending chest CT today. Per , spinal stimulator is non-MRI compatible. Levophed 10mcg. 30% Fio2. Afebrile.   4/22: Fever 101.3 this am. WBC 20,300 down from 32,200 yesterday. BAL growing Klebsiella pneumoniae but susceptibility panel not set up until today. CTA of brain shows no lesion. CT of chest shows enlarging cavitary lung lesions bilat and large loculated new left pleural effusion and large hydropneumothorax on right. TPA & dornase infusions of right chest ended 4/20 after considerable bleeding requiring transfusion. Hb now down again to 6.8.  4/23: WBC 23k. Fio2 40%. Tmax 38.1. US of stiumlator shows small fluid collection but no drainable abscess. Pending MICAH today. Pending L chest tube placement  4/24: Continue fever 100.8-101.8. WBC 16,600. MICAH shows large vegetations on both tricuspid valve and mitral valve with mild TR & MR.  No aortic root abscess. Left chest tube placed yesterday yielding 8 ml of old bloody fluid. Bronch today revealed copious thick maroon secretions in distal trachea and both mainstem bronchi. On the right these were obstructive. Remaining off pressors. Last positive blood cultures (MSSA) were 4/16, negative 4/17.  4/25: Fever 100.6 this AM. wBC 13,300. Hb 6.6. CT: right hydropneumothorax increasing, right bronchopleural fistula, mediastinal shift ot the left , multiple cavitary pulmonary nodules, 3.1 cm left basilar mass, splenomegaly. CT of head shows dural thickening over the clivus. Lac+ GNR >100,000 col/ml growing from BAL of 4/24, presumed Klebsiella. Left peural fluid culture negative from 4/23.  4/26: Fever 100.4 last night. WBC 13,500. Hb 7.4. Plts 502,000. ESR >120. UA fairly clear except 30 mg% protein, 2-5 wbc and rare rbc. CXR unchanged except more prominent pulmonary edema. GNR in BAL may be a  different species of Klebsiella, and resistant to ampicillin & tmp-sulfa; confirmation pending.  4/28: Fever 100.8 last night. WBC 27,700. Hb 6.6. Plts 482,000. Creat 0.19. Kleb pneum in BAL on 4/24 is resistant to ampicillin & tmp-sulfa but otherwise sensitive. O2 sat 96% on FIO2 30% now, PEEP 8. Has been re-evaluated by thoracic surgeon, Dr. Ganser, who believes she will not wean without decortication on the right. Surgery anticipated today.  4/29: S/P right thoracoscopy with decortication with cultures NG at 24 hrs.  4/30: Had a bronch due to hypoxia and hypotension. CXR with worsening right hemithorax pulmonary opacities. Bloody mucous plugs removed. Pressors started. Febrile this am. Tachy in 130s. Pressors just removed. Tolerating vent, but not a SBT candidate at the moment.  5/1: Small air leak CT-2 every ~ 2/3 breathes. The K. pneumoniae from her thoracoscopy is sensitive to her cefepime so no antibiotic change needed.   5/2: No air leak today she tolerated 2  hrs of spontaneous breathing with support today.      5/3: Second day with SBT and doing well now for 2 hrs. Slight bump in temperature and      WBC's but no obvious clinical infectious changed so watch closely.      5/4: Fever still from time to time. WBCs trending up. Chest tubes in place. Trach.       5/5: She clearly is better today she was sitting up in bed with open eyes and follows commands. She still has low grade fever but her WBC's are dropping. Cefepime dose increased yesterday for increased CNS coverage if necessary. She will probably need further thoracic surgery as mentioned by Dr. Ganser. The issue of what to do with her stimulator is still up in the air for now as it probably will need to be removed but she is nort a candidate for more major surgery at this time.       5/7: She continues to improve and under go longer SBT trials. She just had a new PICC placed in her right arm and plan is to D/C central line.        5/8: PICC placed. No  fevers. Comfortable. Cortrak placed. Failing SBTs.  5/9: No acute issues. No fevers. Comfortable. Family at bedside.   5/10: No acute issues, fevers or WBC bumps. No changes in condition. She is to get her CT scan today and be reviewed by surgery  5/11:  at bedside.  Has been referred to LANE.  Repeat CT scan completed.  Results noted.  ? If Dr Ganser has seen.  Still with an empyema:  ? If candidate for further surgery.  5/12: Pending next thoracic surgery. No fever. Anxious.  5/13: ? Surgery date.  No one seems to know.  Had speaking valve in and having swallow eval with speech therapy  5/14: No acute issues. No fevers. Surgery Monday.  5/15: One CT accidentally pulled out yesterday.  Has been on T piece and tolerating. Plans for surgery Monday afternoon.  5/16: On schedule for surgery 5/17: 4:30 pm.  Moved to room T614.  No new issues.  5/17: Was sitting up in the bedside chair since change of shift.  Now walking in hallways with RN and CNA.  5/18: Decortication yesterday with 2 chest tube placement. WBC improving 21->14k  5/19: Hypotension and tachycardia.  Decreased H/H.  Placed back on vent to rest.  Dr Lozano in process of placing new line.  Antibiotics were deesculated yesterday to Cefazolin.  No cx were taken at surgery.  5/20: Pt was febrile yest afternoon 38.2, but now afebrile overnight after meropenem. WBC improved 11k->8k. Fio2 30%. Phenylephrine now off this morning. CXR shows new R consolidations.   5/21: Off pressors.  On vent: FiO2 30%, PEEP 8, PS 5 Received pRBCs yesterday.  5/22: Remains afebrile, off pressors. FiO2 30%.   5/23: Afebrile. FiO2 30%. Bronchoscopy yesterday normal.   5/24: No new cultures obtained at last bronchoscopy.  On  T piece this am: FiO2 30%.  5/25: Remaining afebrile. WBC 8600.   5/26: Chest tube removed today. Possible transfer to [facility].  5/27: O2 sat usuallyl 95% but intermittent desat to 87-89%.  Remaining afebrile.WBC 6100. Alk phos 641 but normal transaminases.    5/28: Pending LANE transfer. NO new issues overnight       Review of Systems  Review of Systems   All other systems reviewed and are negative.       Physical Exam  Temp:  [35.8 °C (96.5 °F)-36.8 °C (98.2 °F)] 36.4 °C (97.6 °F)  Pulse:  [] 108  Resp:  [14-24] 20  BP: ()/(56-84) 95/66  SpO2:  [95 %-97 %] 95 %    Physical Exam  Constitutional:       Appearance: Normal appearance. Awake and alert  HENT:      Head: Normocephalic.      Comments: NG tube  Neck:      Comments: tracheostomy  Cardiovascular:      Rate and Rhythm: Normal rate and regular rhythm.   Pulmonary:      Comments: dimninished in R base  Abdominal:      General: Abdomen is flat. Bowel sounds are normal.   Neurological:      Mental Status: She is alert and oriented to person, place, and time.   Fluids    Intake/Output Summary (Last 24 hours) at 5/28/2021 0951  Last data filed at 5/28/2021 0455  Gross per 24 hour   Intake 210 ml   Output 900 ml   Net -690 ml       Laboratory  Recent Labs     05/26/21 0220 05/27/21  0507 05/28/21  0410   WBC 8.8 6.1 6.6   RBC 3.40* 3.28* 3.58*   HEMOGLOBIN 9.6* 9.5* 10.1*   HEMATOCRIT 31.6* 30.0* 32.9*   MCV 92.9 91.5 91.9   MCH 28.2 29.0 28.2   MCHC 30.4* 31.7* 30.7*   RDW 58.5* 57.7* 57.4*   PLATELETCT 447* 419 438   MPV 9.3 9.1 9.1     Recent Labs     05/26/21 0220 05/27/21  0507 05/28/21  0410   SODIUM 136 136 138   POTASSIUM 3.8 3.8 4.0   CHLORIDE 102 101 100   CO2 27 26 27   GLUCOSE 111* 88 90   BUN 14 8 5*   CREATININE <0.17* 0.17* 0.22*   CALCIUM 9.0 9.0 9.3                   Impression   -Severe sepsis  -Catheter related bloodstream infection due to infected port status post removal 4/12-staph aureus  -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       - Acute left empyema s/p chest tube placement 4/23, decortication 5/17, chest tube removal 5/26 - Klebsiella  -Multiple acute septic  pulmonary emboli bilaterally  -Acute bilateral pneumonia due to above     --Pulmonary edema  -Pseudotumor cerebri with underlying  shunt: possible arachnoiditis  -Chronic migraine headaches  -History of autoimmune vasculitis  -Elevated alkaline phosphatase & bilirubin  -Acute encephalopathy due to sepsis      - anemia due to above          - acute fever on 5/19 likely from secondary VAP          - Secondary VAP R sided pneumonia          - decubitus ulcer sacral.      Plan   Tracheal aspirate recently 5/19 no growth, blood cultures 5/19 no growth so far. CXR shows worsening R sided infiltrates concerning for secondary infections after reviewing her CXR's between 5/18 and  5/19.  Repeated sputum cultures still reflecting her original Klebsiella empyema infection. She improved post operatively with defervescence and pressor weaning while on meropenem. Suggestive of either time, or      an anaerobic infection that we weren't covering. Continue meropenem for 7 to 8 days, then switch back to cefazolin or Unasyn to complete therapy for endocarditis.     - continue cefazolin 4 week target date after decortication 6/14/21  - Received Meropenem for VAP for 7 days finished 5/26  - Will need repeat Chest CT right before completion of antibiotics  - after completion of IV abx  Will need suppression therapy afterward for her HW (spinal stimulator,  shunt  targeting MSSA with Keflex, for example).    - chest tube removed 5/26     Dispo: Pt to transfer to [facility]. Micheal will follow patient there. Awaiting on bed availability     30 min spent  Case discussed with pt, RN , Dr Dos Santos

## 2021-05-28 NOTE — PROGRESS NOTES
Bedside shift report received from CAROLINA Ellison.  Safety check complete.  All patient needs met at this time.  Will continue to monitor.

## 2021-05-28 NOTE — DIETARY
NUTRITION SERVICES: UPDATE    Pt transitioned from TF to PO diet on 5/26. Current diet: soft and bite sized, mildly thick liquid. PO intake per chart review <25% x 1, 50-75% x 1, % x 1 since 5/27. Cortrak pulled today per RN note.    RD following.

## 2021-05-28 NOTE — CARE PLAN
The patient is: Watcher - Medium risk of patient condition declining or worsening    Progress made toward(s) clinical / shift goals:  Patient hemodynamically stable and afebrile.  Patient's pain controlled with PO pain medications.  Patient remains free from falls.  Patient is tolerating PO diet; intake progressing.    Patient is not progressing towards the following goals:  Patient remains weak and debilitated, unable to perform some bed mobility and self-care activities but per dayshift RN was ambulatory with stand-by assistance.        Problem: Pain Management  Goal: Pain level will decrease to patient's comfort goal  Outcome: Progressing     Problem: Knowledge Deficit - Standard  Goal: Patient and family/care givers will demonstrate understanding of plan of care, disease process/condition, diagnostic tests and medications  Outcome: Progressing     Problem: Fall Risk  Goal: Patient will remain free from falls  Outcome: Progressing     Problem: Mobility  Goal: Patient's capacity to carry out activities will improve  Outcome: Progressing     Problem: Hemodynamics  Goal: Patient's hemodynamics, fluid balance and neurologic status will be stable or improve  Outcome: Progressing     Problem: Fluid Volume  Goal: Fluid volume balance will be maintained  Outcome: Progressing     Problem: Urinary - Renal Perfusion  Goal: Ability to achieve and maintain adequate renal perfusion and functioning will improve  Outcome: Progressing     Problem: Respiratory  Goal: Patient will achieve/maintain optimum respiratory ventilation and gas exchange  Outcome: Progressing     Problem: Physical Regulation  Goal: Diagnostic test results will improve  Outcome: Progressing  Goal: Signs and symptoms of infection will decrease  Outcome: Progressing      Problem: Mechanical Ventilation  Goal: Safe management of artificial airway and ventilation  Outcome: Met  Goal: Successful weaning off mechanical ventilator, spontaneously maintains adequate  gas exchange  Outcome: Met  Goal: Patient will be able to express needs and understand communication  Outcome: Met

## 2021-05-28 NOTE — CARE PLAN
Problem: Pain Management  Goal: Pain level will decrease to patient's comfort goal  Outcome: Progressing     Problem: Knowledge Deficit - Standard  Goal: Patient and family/care givers will demonstrate understanding of plan of care, disease process/condition, diagnostic tests and medications  Outcome: Progressing   The patient is Watcher - Medium risk of patient condition declining or worsening    Shift Goals  Clinical Goals: increase mobility  Patient Goals: pain control    Progress made toward(s) clinical / shift goals:  took NG tube out, eating normally    Patient is not progressing towards the following goals:

## 2021-05-28 NOTE — PROGRESS NOTES
Cortrak flushing very sluggishly.  Medications were attempted to be administered at 2123 but while attempting to administer medications after flushing, Cortrak completely occluded and solution spilled on bed.  Charge RN Kelley notified that medications including Oxycodone were not administered to patient.  Charge RN recommended that I call hospitalist. Dr. New notified of situation; received order for additional one time dose of Oxycodone.  Medications administered crushed in puree- patient tolerated well.

## 2021-05-28 NOTE — PROGRESS NOTES
Hospital Medicine Daily Progress Note    Date of Service  5/28/2021    Chief Complaint  50 y.o. female admitted 4/16/2021 with recurrent infections    Hospital Course   This is a 50-year-old female with a past medical history of pseudotumor cerebri s/p  shunt implantation by Dr. 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  [facility] infectious disease is managing antibiotics for the patient.  Dr. Candealria surgery removed the port on 4/12.  The patient was further identified to have endocarditis of the tricuspid valve, a lumbar puncture performed on 4/14 showed pleocytosis with negative Gram stain.  The patient suffered worsening leukocytosis and was found to have septic emboli per CT.  Initially a small pleural catheter was placed.  Significant loculations were encountered.  MSSA empyema was diagnosed.  Neurosurgery was consulted to comment on possibly removal of a  shunt, this was felt not appropriate at this time.  The patient remained on mechanical ventilation for 15 days, then a perc trach was placed.  The patient was eventually liberated from mechanical ventilation, the patient did have a right thoracoscopy and decortication of the lung performed on 4/28 by Dr. Ganser.  The patient was of identified to have multiple sources for MSSA including mitral valve, tricuspid valve vegetation, port, spinal stimulator,  shunt possibly.  Patient underwent repeat chest surgery, decortication with 2 chest tube placement on 5/18, had to be placed back on mechanical ventilation after surgery, central access was replaced on 5/19, the patient treated  in ICU with eventual chest tube removal with assistance of thoracic surgery and was deescalated in terms of respiratory support down to trach collar.  [place] in need of extensive rehabilitation, question of LTAC level    Interval Problem Update  Patient seen and examined today.    Patient tolerating treatment and therapies.  All Data, Medication data reviewed.  Case discussed with nursing as available.  Plan of Care reviewed with patient and notified of changes.  5/26 the patient on T-piece, 30% FiO2, no increase in work of breathing, small amount of secretions, good cough, chest x-ray improving, chest surgeon removed both chest tubes, ongoing antibiotic treatment with assistance of [facility] infectious disease.  Transfer options being evaluated.  Afebrile, tachycardic with heart rate in the 1 teens, blood pressure is soft but maintaining MAP.  Laboratory data reviewed, electrolytes are balanced, no leukocytosis, improving hemoglobin, chest x-ray with stable lines and tubes, stable ill-defined bilateral pulmonary opacities, no large effusions  5/27 the patient appears better, she is ambulating, the patient was cleared for diet, the patient is awake alert and following, afebrile, blood pressure between 90 and 130, no increase in respiratory difficulty, afebrile, ongoing antibiotic treatment through 6/14  Ongoing rehab efforts, awaiting LTAC approval    5/28 patient is in bed, core track removed, patient is tolerating diet, family is at bedside, discussed with , LTAC pending, patient tachycardic any symptoms no fever, blood pressure is soft but stable.    Consultants/Specialty  ID  Thoracic surgery  Pulmonary critical care    Code Status  Full Code    Disposition  Rehab, hopefully LTAC    Review of Systems  Review of Systems   Unable to perform ROS: Other   Poor communication secondary to tracheostomy    Physical Exam  Temp:  [35.8 °C (96.5 °F)-36.8 °C (98.2 °F)] 36.7 °C (98 °F)  Pulse:  [] 90  Resp:   [18-24] 18  BP: ()/(59-84) 99/62  SpO2:  [94 %-97 %] 95 %    Physical Exam  Vitals and nursing note reviewed.   Constitutional:       General: She is not in acute distress.     Appearance: She is well-developed. She is ill-appearing. She is not diaphoretic.   HENT:      Head: Normocephalic and atraumatic.   Eyes:      General:         Right eye: No discharge.         Left eye: No discharge.   Neck:      Vascular: No JVD.   Cardiovascular:      Rate and Rhythm: Regular rhythm. Tachycardia present.   Pulmonary:      Effort: Pulmonary effort is normal. No respiratory distress.      Breath sounds: No stridor. Rales present. No wheezing or rhonchi.      Comments: Trach without inflammation  Abdominal:      Palpations: Abdomen is soft.      Tenderness: There is no abdominal tenderness. There is no guarding or rebound.   Skin:     General: Skin is warm and dry.      Coloration: Skin is pale.      Findings: No rash.   Neurological:      Mental Status: She is alert and oriented to person, place, and time.      Motor: Weakness present.   Psychiatric:         Thought Content: Thought content normal.         Fluids    Intake/Output Summary (Last 24 hours) at 5/28/2021 1504  Last data filed at 5/28/2021 0455  Gross per 24 hour   Intake 90 ml   Output 900 ml   Net -810 ml       Laboratory  Recent Labs     05/26/21 0220 05/27/21  0507 05/28/21  0410   WBC 8.8 6.1 6.6   RBC 3.40* 3.28* 3.58*   HEMOGLOBIN 9.6* 9.5* 10.1*   HEMATOCRIT 31.6* 30.0* 32.9*   MCV 92.9 91.5 91.9   MCH 28.2 29.0 28.2   MCHC 30.4* 31.7* 30.7*   RDW 58.5* 57.7* 57.4*   PLATELETCT 447* 419 438   MPV 9.3 9.1 9.1     Recent Labs     05/26/21 0220 05/27/21  0507 05/28/21  0410   SODIUM 136 136 138   POTASSIUM 3.8 3.8 4.0   CHLORIDE 102 101 100   CO2 27 26 27   GLUCOSE 111* 88 90   BUN 14 8 5*   CREATININE <0.17* 0.17* 0.22*   CALCIUM 9.0 9.0 9.3                   Imaging  DX-CHEST-PORTABLE (1 VIEW)   Final Result         1. Stable lines and tubes.   2.  Stable ill-defined bilateral pulmonary opacities, right worse than left. No large pleural effusions.         DX-CHEST-PORTABLE (1 VIEW)   Final Result      1.  Decrease in volume of right pleural fluid collection/empyema with 2 right chest tubes in place. No pneumothorax identified.      2.  No focal consolidation in the left lung.      DX-CHEST-PORTABLE (1 VIEW)   Final Result      No significant interval change.      DX-CHEST-PORTABLE (1 VIEW)   Final Result         1.  Hazy right pulmonary infiltrates and/or effusion, stable compared to prior study.      DX-CHEST-PORTABLE (1 VIEW)   Final Result         1.  Hazy right pulmonary infiltrates and/or effusion, stable compared to prior study.      DX-CHEST-PORTABLE (1 VIEW)   Final Result      1.  All lines and tubes appear appropriately located      2.  Consolidation throughout the right lung consistent with pneumonia      DX-CHEST-PORTABLE (1 VIEW)   Final Result         1.  Hazy right pulmonary infiltrates and/or effusion, increased compared to prior study.      DX-ABDOMEN FOR TUBE PLACEMENT   Final Result      Feeding tube looped in the stomach.      DX-CHEST-PORTABLE (1 VIEW)   Final Result         1. Stable lines and tubes. No pneumothorax detected.   2. Persistent opacities in the right perihilar region and in the right lung periphery, similar to prior study. Left lung is essentially clear.      DX-CHEST-PORTABLE (1 VIEW)   Final Result      Postoperative changes of the right chest with placement of an additional chest tube. There is improved aeration in the right lung. No significant pleural effusion or definite pneumothorax.      Right IJ central venous catheter tip projects over the proximal right atrium.      Hypoinflation with interstitial and hazy pulmonary opacities.      DX-CHEST-PORTABLE (1 VIEW)   Final Result         1.  Hazy right pulmonary infiltrates and/or effusion, similar compared to prior study.      DX-ABDOMEN FOR TUBE PLACEMENT   Final  Result      Feeding tube tip projects over the expected location the gastric antrum.      DX-CHEST-PORTABLE (1 VIEW)   Final Result         1.  Hazy right pulmonary infiltrates and/or effusion, similar compared to prior study.      DX-CHEST-LIMITED (1 VIEW)   Final Result      1.  No pneumothorax. Only one chest tube is now seen in the right lung.   2.  The chest is otherwise stable.      DX-CHEST-PORTABLE (1 VIEW)   Final Result         1.  Hazy right pulmonary infiltrates and/or effusion, similar compared to prior study.      DX-CHEST-PORTABLE (1 VIEW)   Final Result         1.  Hazy right pulmonary infiltrates and/or effusion, similar compared to prior study.      DX-ABDOMEN FOR TUBE PLACEMENT   Final Result         1.  Nonspecific bowel gas pattern.   2.  Dobbhoff tube tip terminates overlying the expected location of the gastric antrum.   3.  Hazy bilateral lung base infiltrates, greater on the right.      DX-CHEST-PORTABLE (1 VIEW)   Final Result         1.  Hazy right pulmonary infiltrates and/or effusion, similar compared to prior study.      US-RUQ   Final Result         1.  Hepatomegaly   2.  Mild intrahepatic and extrahepatic biliary ductal dilatation, commonly associated with postcholecystectomy physiology, consider causes of biliary obstruction with additional workup as clinically appropriate      CT-CHEST,ABDOMEN,PELVIS WITH   Final Result      1.  Large empyema in the RIGHT hemithorax, slightly decreased in size from prior exam with chest tubes present.   2.  Consolidation remains.   3.  Multiple cavitary lesions again seen in the LEFT upper lobe, minimally decreased from prior, concerning for septic emboli.   4.  Supportive tubing is unchanged.   5.  Intrahepatic and extrahepatic biliary dilation, likely postoperative although distal stricture, stone or mass remain possibilities.   6.  Moderate pelvic fluid, nonspecific.   7.  No evidence of bowel obstruction or perforation.      DX-CHEST-PORTABLE (1  VIEW)   Final Result         1.  Hazy right pulmonary infiltrates and/or effusion, similar compared to prior study.      DX-CHEST-PORTABLE (1 VIEW)   Final Result         No significant interval change.            DX-CHEST-PORTABLE (1 VIEW)   Final Result         Interval removal of left central venous catheter. Interval adjustment of right PICC with tip in the SVC.         DX-ABDOMEN FOR TUBE PLACEMENT   Final Result      Feeding tube tip at the mid to distal stomach.      IR-PICC LINE PLACEMENT W/ GUIDANCE > AGE 5   Final Result                  Ultrasound-guided PICC placement performed by qualified nursing staff as    above.          DX-CHEST-PORTABLE (1 VIEW)   Final Result      1.  Stable cardiopulmonary findings.   2.  See comments above regarding the right-sided PICC position.      DX-CHEST-PORTABLE (1 VIEW)   Final Result      Stable chest findings compared to 5/5.      DX-CHEST-PORTABLE (1 VIEW)   Final Result      Stable chest findings compared with 5/3.      DX-ABDOMEN FOR TUBE PLACEMENT   Final Result         Feeding tube with tip projecting over the expected area of the stomach.      DX-CHEST-PORTABLE (1 VIEW)   Final Result      Stable chest findings compared with prior.      DX-CHEST-PORTABLE (1 VIEW)   Final Result         1. No significant interval change.      US-EXTREMITY ARTERY LOWER UNILAT RIGHT   Final Result      DX-CHEST-PORTABLE (1 VIEW)   Final Result         1. No significant interval change.      IR-PICC LINE PLACEMENT W/ GUIDANCE > AGE 5   Final Result                  Ultrasound-guided PICC placement performed by qualified nursing staff as    above.          DX-CHEST-PORTABLE (1 VIEW)   Final Result         1.  Pulmonary edema and/or infiltrates, similar to prior study.   2.  Stable opacification right lung, likely effusion. Thoracostomy tubes are in place.   3.  Multiple cavitary appearing left pulmonary lesions, see CT performed April 27, 2021 for further characterization       DX-CHEST-PORTABLE (1 VIEW)   Final Result         1.  Pulmonary edema and/or infiltrates, similar to prior study.   2.  Single opacification right lung, likely effusion. Thoracostomy tubes are in place.   3.  Multiple cavitary appearing left pulmonary lesions, see CT performed April 27, 2021 for further characterization   4.  Soft tissue gas in the right chest wall      DX-CHEST-PORTABLE (1 VIEW)   Final Result         1.  Pulmonary edema and/or infiltrates, similar to prior study.   2.  Increased opacification right lung, likely increased effusion. Thoracostomy tubes are in place.   3.  Multiple cavitary appearing left pulmonary lesions, see CT performed April 27, 2021 for further characterization   4.  Soft tissue gas in the right chest wall      DX-CHEST-PORTABLE (1 VIEW)   Final Result         1.  Pulmonary edema and/or infiltrates, similar to prior study.   2.  Right pneumothorax, stable compared to prior study, right thoracostomy tubes remain in place   3.  Multiple cavitary appearing left pulmonary lesions, see CT performed April 27, 2021 for further characterization   4.  Soft tissue gas in the right chest wall      DX-CHEST-PORTABLE (1 VIEW)   Final Result         1.  Pulmonary edema and/or infiltrates, similar to prior study.   2.  Right pneumothorax, interval decreased compared to prior study, interval placement of right thoracostomy tubes is noted.   3.  Multiple cavitary appearing left pulmonary lesions, see CT performed yesterday for further characterization   4.  Soft tissue gas in the right chest wall      DX-CHEST-PORTABLE (1 VIEW)   Final Result         1.  Pulmonary edema and/or infiltrates, similar to prior study.   2.  Right pneumothorax, stable compared to prior study, interval removal of right thoracostomy tube is noted.   3.  Multiple cavitary appearing left pulmonary lesions, see CT performed yesterday for further characterization   4.  Soft tissue gas in the right chest wall       DX-CHEST-LIMITED (1 VIEW)   Final Result         No significant interval change.      CT-CTA CHEST PULMONARY ARTERY W/ RECONS   Final Result         No CT evidence of pulmonary embolus.      Previous right chest tube were removed.      Grossly similar in size of the loculated right hydropneumothorax but there is increased layering fluid component. Unchanged mild shift of the mediastinal to the left.      Redemonstration of a pigtail catheter in the medial left lung base. Grossly similar multiple cavitary lesions in the left lung.      US-EXTREMITY VENOUS LOWER BILAT   Final Result      POCT BUTTERFLY-DUPLEX SCAN EXTREMITY VEINS LIMITED   Final Result      DX-CHEST-PORTABLE (1 VIEW)   Final Result         1.  Pulmonary edema and/or infiltrates, similar to prior study.   2.  Right pneumothorax, somewhat decreased to prior study, interval removal of right thoracostomy tube is noted.   3.  Multiple cavitary appearing left pulmonary lesions, see CT performed yesterday for further characterization   4.  Soft tissue gas in the right chest wall      DX-CHEST-PORTABLE (1 VIEW)   Final Result         1.  Pulmonary edema and/or infiltrates, similar to prior study.   2.  Right pneumothorax, similar to prior study with thoracostomy tube in place.   3.  Multiple cavitary appearing left pulmonary lesions, see CT performed yesterday for further characterization      CT-CHEST (THORAX) W/O   Final Result      Large loculated right hydropneumothorax with interval increase in size of the pneumothorax and pleural fluid.      Right chest tube is in the posterior right thorax.      There is mediastinal shift to the left compatible with tension.      Small pericardial effusion.      Small loculated left pleural effusion with overlying atelectasis/consolidation.   Pigtail catheter is seen at the medial left lung base. Pleural effusion has decreased in size compared to prior.      There are multiple foci of air extending from the right  lung to the pleural space suspicious for a bronchopleural fistula.      Multiple cavitary lesions bilaterally with mild interval decrease of the solid component of the cavitary nodules.      Noncavitary 3.1 cm masslike density is seen at the left lung base.      Findings may be related to septic emboli, malignancy or multifocal abscesses. Short interval follow-up recommended.      Soft tissue air on the right is again seen.      Splenomegaly      Findings discussed with Leti Sun.      DX-CHEST-PORTABLE (1 VIEW)   Final Result      No significant change from prior exam.      CT-HEAD W/O   Final Result      1.  RIGHT frontal ventriculostomy catheter unchanged in position.   2.  Mild cortical atrophy.   3.  No intracranial hemorrhage.   4.  Apparent dural thickening overlying the clivus.  Consider further evaluation with contrast-enhanced MRI.      DX-CHEST-LIMITED (1 VIEW)   Final Result      Interval left basilar pigtail catheter with diminished atelectasis, pleural fluid      Stable right hydropneumothorax with thoracostomy tube in place, considerable soft tissue gas and partial lung collapse      IR-CHEST TUBE-EMPYEMA LEFT   Final Result      1.  CT GUIDED LEFT  [facility] [employer] PIGTAIL CHEST TUBE PLACEMENT FOR POSSIBLE EMPYEMA YIELDING OLD BLOODY FLUID.      2. A CHEST RADIOGRAPH IS FORTHCOMING.      EC-MICAH W/O CONT   Final Result      US-ABDOMEN LTD (SOFT TISSUE)   Final Result      No fluid seen surrounding the electronic implanted spinal stimulator device.      DX-CHEST-LIMITED (1 VIEW)   Final Result      1.  Large right hydropneumothorax with right-sided chest tube in place, likely stable to slightly decreased from prior study.   2.  Small left pleural effusion. Mild improved aeration in the left lung.   3.  Bilateral pulmonary opacities which could be related to combination of atelectasis, edema and/or infection..      CT-CTA HEAD WITH & W/O-POST PROCESS   Final Result      1.  Patent carotid and vertebral  arteries.      2.  Again seen right frontal the jugular peritoneal shunt catheter. There is mild increase in volume of the lateral and third ventricles.      CT-CHEST (THORAX) W/O   Final Result      1.  Interval placement of a right-sided chest tube into a large loculated right-sided pleural effusion. There is now seen a large right-sided hydropneumothorax in the area that previously seen fluid. The chest tube extends into that hydropneumothorax.    There is some residual pleural fluid seen as well. A hydropneumothorax is somewhat loculated with components most prominently inferiorly and medially.      2.  New loculated left pleural effusion.      3.  Again seen multiple bilateral cavitary pulmonary masses which are more prominent than previously seen with increasing cavitation and measure up to 3 cm size. Differential diagnosis includes septic emboli, multifocal abscesses and neoplasm.      4.  Large amount of subcutaneous emphysema on the right.      5.  Splenomegaly.      6.  Multiple support devices.      Fleischner Society pulmonary nodule recommendations:         DX-CHEST-LIMITED (1 VIEW)   Final Result      1.  Support devices as described above.      2.  Right chest tube present with a again seen right-sided pneumothorax, possibly increased in size and loculated.      3.  Worsening bilateral pulmonary infiltrates.      DX-ABDOMEN FOR TUBE PLACEMENT   Final Result      Cortrak feeding tube tip projects in the region of the duodenal bulb.      DX-CHEST-LIMITED (1 VIEW)   Final Result      Loculated right pneumothorax is decreased in size compared to prior.      Small left pleural effusion.      Small loculated right pleural effusion.      Extensive bilateral airspace opacities are not significantly changed.      Soft tissue air on the right is again noted.      DX-CHEST-LIMITED (1 VIEW)   Final Result      Decreased partially loculated right pleural fluid with increased pleural air. Right chest tube is in  place.      Increased hazy opacity in the left lung possibly atelectasis.         US-ABDOMEN LTD (SOFT TISSUE)   Final Result      No drainable fluid collection.      DX-CHEST-PORTABLE (1 VIEW)   Final Result      Decreased partially loculated right pleural fluid with increased pleural air. Right chest tube is in place.      No other significant change.      DX-CHEST-PORTABLE (1 VIEW)   Final Result         1.  Pulmonary edema and/or infiltrates are identified, which are somewhat decreased since the prior exam.   2.  Moderate loculated appearing right pleural effusion, slightly decreased since prior      DX-ABDOMEN FOR TUBE PLACEMENT   Final Result      Feeding tube tip at the distal stomach.      DX-CHEST-PORTABLE (1 VIEW)   Final Result         1.  New chest tube is noted in the right lower hemithorax.      2.  Right pleural effusion is again identified which appears similar to the prior exam.      3.  No new infiltrates or consolidations are identified.      DX-CHEST-PORTABLE (1 VIEW)   Final Result         1.  Pulmonary edema and/or infiltrates are identified, which are stable since the prior exam.   2.  Moderate loculated appearing right pleural effusion      DX-CHEST-PORTABLE (1 VIEW)   Final Result         No significant interval change.      POCT BUTTERFLY-ECHOCARDIOGRAM LTD W/O CONT    (Results Pending)        Assessment/Plan  * Empyema of right pleural space (HCC)- (present on admission)  Assessment & Plan  Patient s/p multiple chest tubes, thoracotomy and decortication  Now s/p second decortication 5/17      Debility  Assessment & Plan  Acute on chronic, the patient likely need of LTAC treatment post acute treatment    Elevated alkaline phosphatase level- (present on admission)  Assessment & Plan  The patient not able to tolerate a MRCP at this time, ultrasound grossly unremarkable, observe    Agitation requiring sedation protocol- (present on admission)  Assessment & Plan  Monitor,    Goals of care,  counseling/discussion  Assessment & Plan  Patient with overall high risk of morbidity and mortality given her gravely ill state and prior chronic medical conditions  Palliative care following    Spinal cord stimulator status  Assessment & Plan  Patient's stimulator is [employer]. It is FDA approved as MRI compatible, but the company has gone out of business, so there is no support team to assist with MRI.  Thus, if MRI were performed, our radiologists would need to protocol it correctly to manage the stimulator. The former rep from [employer] is Andre, [phone].  Information obtained from Dr. Mahoney's office, [phone].     Per  (5/4/2021), it is not MRI compatible unfortunately.     Thrombocytosis (HCC)  Assessment & Plan  Acute reaction since resolved    Anasarca- (present on admission)  Assessment & Plan  Improved nutritional status, skin protective measures    Trapped lung  Assessment & Plan  Now s/p decortication   Chest tubes removed on 5/26, observe    Pulmonary abscess (HCC)  Assessment & Plan  From septic emboli  Now s/p decortication    Fever  Assessment & Plan  Resolved, monitor, antibiotic therapy    Atelectasis  Assessment & Plan  Pulmonary recruiting, respiratory care    Pneumonia  Assessment & Plan  MSSA  Ancef per ID    History of vasculitis- (present on admission)  Assessment & Plan  Does not appear to be a current issue    Bacteremia due to methicillin susceptible Staphylococcus aureus (MSSA)- (present on admission)  Assessment & Plan  Infectious disease assisting with antibiotic treatment  Multiple potential sources  Endocarditis  ID following    CSF pleocytosis- (present on admission)  Assessment & Plan  Infectious disease managing    Acute bacterial endocarditis- (present on admission)  Assessment & Plan  Mitral valve, tricuspid valve  MSSA  Ongoing antibiotic therapy for acute infection and 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    Acute blood loss anemia  Assessment &  Plan  Likely secondary to hemothorax  300 cc of blood removed after chest tube placed at [facility]  Trend hemoglobin    Prolonged Q-T interval on ECG- (present on admission)  Assessment & Plan  Resolved after correcting metabolic issues  Cont to follow   Monitor electrolytes, acidosis etc    Acute respiratory failure with hypoxia (HCC)- (present on admission)  Assessment & Plan  Secondary to MSSA bacteremia, empyema, pulmonary septic emboli  S/p tracheostomy  Ongoing respiratory care, pulmonary toilet, antibiotic treatment  Prolonged ventilator dependence  Will need LTAC    History of complicated migraines- (present on admission)  Assessment & Plan  Monitor    GERD (gastroesophageal reflux disease)- (present on admission)  Assessment & Plan  History of, as needed treatment    Hyponatremia  Assessment & Plan  Resolved  Follow daily    Tachycardia  Assessment & Plan  Persistent, likely secondary with multiple underlying ongoing conditions  Monitor  Stable    Pseudotumor cerebri  Assessment & Plan  History of  shunt, neurosurgery opted against removal    H/O: CVA (cerebrovascular accident)- (present on admission)  Assessment & Plan  Monitor    Chronic pain syndrome- (present on admission)  Assessment & Plan  Pre-existing, pain management    Port or reservoir infection  Assessment & Plan  S/p removal    Thrombocytopenia (HCC)- (present on admission)  Assessment & Plan  Transient, resolved    Anemia- (present on admission)  Assessment & Plan  Monitor, transfuse as indicated    Septic shock (HCC)- (present on admission)  Assessment & Plan  Multiple sources, recovered with aggressive treatment, IV antibiotics long-term  Resolved    Sepsis (HCC)  Assessment & Plan  Multiple sources possible, recovered with medical treatment  Now on long term treatment of MSSA bacteremia   [facility] Infectious Disease following  Continue antibiotics stop date 6/14/21    Hypomagnesemia  Assessment & Plan  Monitor, replace and  recheck    Hypokalemia  Assessment & Plan  Monitor, replace and recheck    Anxiety- (present on admission)  Assessment & Plan  Likely acute on chronic  Prn management  SSRI    S/P  shunt- (present on admission)  Assessment & Plan  History of, Dr. Oh felt there is currently no need to remove    Acute encephalopathy- (present on admission)  Assessment & Plan  Per neurosurgery no need to remove  shunt  Improving with ongoing medical treatment  Improved           VTE prophylaxis: enoxaparin    Case and plan of care discussed with nurse staff, family and during multidisciplinary rounds.    Patient is has a high medical complexity and is at high risk for complication, morbidity, and mortality.

## 2021-05-28 NOTE — PROGRESS NOTES
2 RN skin check complete with CAROLINA Gordon.   Devices in place cortrak, tracheostomy, PICC line, purewick.  Skin assessed under devices intact.  Confirmed pressure ulcers found on right big toe, left third toe.  New potential pressure ulcers noted on N/A. Wound consult placed N/A.  The following interventions in place Q2 turns, cortrak removed per MD order, barrier cream applied to sacrum    Bilateral ears intact, pink, blanching  Tracheostomy in place, clean, dry, intact  Bilateral elbows intact, pink, blanching  Previna to right chest under breast  Sacrum excoriated, red, blanching, barrier cream applied  Skin intact under purewick, device repositioned  DTI to right big toe, blue/black  DTI to left third toe, blue/black

## 2021-05-29 LAB
ANION GAP SERPL CALC-SCNC: 7 MMOL/L (ref 7–16)
BASOPHILS # BLD AUTO: 0.7 % (ref 0–1.8)
BASOPHILS # BLD: 0.04 K/UL (ref 0–0.12)
BUN SERPL-MCNC: 4 MG/DL (ref 8–22)
CALCIUM SERPL-MCNC: 9.3 MG/DL (ref 8.5–10.5)
CHLORIDE SERPL-SCNC: 102 MMOL/L (ref 96–112)
CO2 SERPL-SCNC: 30 MMOL/L (ref 20–33)
CREAT SERPL-MCNC: 0.19 MG/DL (ref 0.5–1.4)
EOSINOPHIL # BLD AUTO: 0.02 K/UL (ref 0–0.51)
EOSINOPHIL NFR BLD: 0.3 % (ref 0–6.9)
ERYTHROCYTE [DISTWIDTH] IN BLOOD BY AUTOMATED COUNT: 56.5 FL (ref 35.9–50)
GLUCOSE SERPL-MCNC: 91 MG/DL (ref 65–99)
HCT VFR BLD AUTO: 31.6 % (ref 37–47)
HGB BLD-MCNC: 9.7 G/DL (ref 12–16)
IMM GRANULOCYTES # BLD AUTO: 0.02 K/UL (ref 0–0.11)
IMM GRANULOCYTES NFR BLD AUTO: 0.3 % (ref 0–0.9)
LYMPHOCYTES # BLD AUTO: 1.11 K/UL (ref 1–4.8)
LYMPHOCYTES NFR BLD: 19.1 % (ref 22–41)
MCH RBC QN AUTO: 28.6 PG (ref 27–33)
MCHC RBC AUTO-ENTMCNC: 30.7 G/DL (ref 33.6–35)
MCV RBC AUTO: 93.2 FL (ref 81.4–97.8)
MONOCYTES # BLD AUTO: 0.49 K/UL (ref 0–0.85)
MONOCYTES NFR BLD AUTO: 8.4 % (ref 0–13.4)
NEUTROPHILS # BLD AUTO: 4.12 K/UL (ref 2–7.15)
NEUTROPHILS NFR BLD: 71.2 % (ref 44–72)
NRBC # BLD AUTO: 0 K/UL
NRBC BLD-RTO: 0 /100 WBC
PLATELET # BLD AUTO: 425 K/UL (ref 164–446)
PMV BLD AUTO: 8.9 FL (ref 9–12.9)
POTASSIUM SERPL-SCNC: 3.8 MMOL/L (ref 3.6–5.5)
RBC # BLD AUTO: 3.39 M/UL (ref 4.2–5.4)
SODIUM SERPL-SCNC: 139 MMOL/L (ref 135–145)
WBC # BLD AUTO: 5.8 K/UL (ref 4.8–10.8)

## 2021-05-29 PROCEDURE — 770020 HCHG ROOM/CARE - TELE (206)

## 2021-05-29 PROCEDURE — 700111 HCHG RX REV CODE 636 W/ 250 OVERRIDE (IP): Performed by: INTERNAL MEDICINE

## 2021-05-29 PROCEDURE — 99232 SBSQ HOSP IP/OBS MODERATE 35: CPT | Performed by: HOSPITALIST

## 2021-05-29 PROCEDURE — 80048 BASIC METABOLIC PNL TOTAL CA: CPT

## 2021-05-29 PROCEDURE — 94760 N-INVAS EAR/PLS OXIMETRY 1: CPT

## 2021-05-29 PROCEDURE — 94640 AIRWAY INHALATION TREATMENT: CPT

## 2021-05-29 PROCEDURE — A9270 NON-COVERED ITEM OR SERVICE: HCPCS | Performed by: HOSPITALIST

## 2021-05-29 PROCEDURE — 700102 HCHG RX REV CODE 250 W/ 637 OVERRIDE(OP): Performed by: HOSPITALIST

## 2021-05-29 PROCEDURE — 85025 COMPLETE CBC W/AUTO DIFF WBC: CPT

## 2021-05-29 RX ADMIN — DULOXETINE HYDROCHLORIDE 60 MG: 60 CAPSULE, DELAYED RELEASE ORAL at 05:20

## 2021-05-29 RX ADMIN — ACETAMINOPHEN 500 MG: 325 TABLET ORAL at 07:50

## 2021-05-29 RX ADMIN — OXYCODONE 5 MG: 5 TABLET ORAL at 21:05

## 2021-05-29 RX ADMIN — GABAPENTIN 300 MG: 300 CAPSULE ORAL at 14:13

## 2021-05-29 RX ADMIN — DIVALPROEX SODIUM 500 MG: 125 CAPSULE ORAL at 14:13

## 2021-05-29 RX ADMIN — DIAZEPAM 5 MG: 5 TABLET ORAL at 21:05

## 2021-05-29 RX ADMIN — GABAPENTIN 300 MG: 300 CAPSULE ORAL at 21:05

## 2021-05-29 RX ADMIN — ONDANSETRON 4 MG: 2 INJECTION INTRAMUSCULAR; INTRAVENOUS at 18:05

## 2021-05-29 RX ADMIN — OXYCODONE 5 MG: 5 TABLET ORAL at 07:50

## 2021-05-29 RX ADMIN — OXYCODONE 5 MG: 5 TABLET ORAL at 12:56

## 2021-05-29 RX ADMIN — GABAPENTIN 300 MG: 300 CAPSULE ORAL at 05:23

## 2021-05-29 RX ADMIN — ACETAMINOPHEN 500 MG: 325 TABLET ORAL at 21:05

## 2021-05-29 RX ADMIN — PAROXETINE HYDROCHLORIDE 10 MG: 10 TABLET, FILM COATED ORAL at 05:23

## 2021-05-29 RX ADMIN — DIVALPROEX SODIUM 500 MG: 125 CAPSULE ORAL at 21:04

## 2021-05-29 RX ADMIN — CEFAZOLIN SODIUM 2 G: 2 INJECTION, SOLUTION INTRAVENOUS at 14:14

## 2021-05-29 RX ADMIN — CEFAZOLIN SODIUM 2 G: 2 INJECTION, SOLUTION INTRAVENOUS at 05:24

## 2021-05-29 RX ADMIN — CEFAZOLIN SODIUM 2 G: 2 INJECTION, SOLUTION INTRAVENOUS at 21:05

## 2021-05-29 RX ADMIN — METHADONE HYDROCHLORIDE 30 MG: 10 TABLET ORAL at 05:19

## 2021-05-29 RX ADMIN — DULOXETINE HYDROCHLORIDE 60 MG: 60 CAPSULE, DELAYED RELEASE ORAL at 16:32

## 2021-05-29 RX ADMIN — ACETAMINOPHEN 500 MG: 325 TABLET ORAL at 14:14

## 2021-05-29 RX ADMIN — RISPERIDONE 2 MG: 2 TABLET ORAL at 05:21

## 2021-05-29 RX ADMIN — OXYCODONE 5 MG: 5 TABLET ORAL at 03:35

## 2021-05-29 RX ADMIN — OXYCODONE 5 MG: 5 TABLET ORAL at 17:06

## 2021-05-29 RX ADMIN — DIVALPROEX SODIUM 500 MG: 125 CAPSULE ORAL at 05:22

## 2021-05-29 RX ADMIN — ENOXAPARIN SODIUM 40 MG: 40 INJECTION SUBCUTANEOUS at 05:23

## 2021-05-29 RX ADMIN — RISPERIDONE 2 MG: 2 TABLET ORAL at 16:32

## 2021-05-29 ASSESSMENT — PAIN DESCRIPTION - PAIN TYPE
TYPE: ACUTE PAIN

## 2021-05-29 ASSESSMENT — ENCOUNTER SYMPTOMS
NAUSEA: 1
ABDOMINAL PAIN: 1

## 2021-05-29 ASSESSMENT — FIBROSIS 4 INDEX: FIB4 SCORE: 0.42

## 2021-05-29 NOTE — CARE PLAN
Problem: Pain Management  Goal: Pain level will decrease to patient's comfort goal  Outcome: Progressing

## 2021-05-29 NOTE — PROGRESS NOTES
Fall precautions in place. Bed in lowest position. Non-skid socks in place. Personal possessions within reach. Mobility sign on door. Bed-alarm on. Call light within reach. Pt educated regarding fall prevention and states understanding.

## 2021-05-29 NOTE — DISCHARGE PLANNING
Anticipated Discharge Disposition: LANE    Action: Lsw received notification from LANE liaison Kiki, there are no beds open    Barriers to Discharge: bed availability    Plan: continue to assist with dc needs

## 2021-05-29 NOTE — PROGRESS NOTES
Infectious Disease Daily Progress Note    Date of Service  5/29/2021    Chief Complaint  Cefazolin 5/26-present.     Meropenem 5/19-5/26   Cefepime 4/23 5/18-  s/p 23 days  Cefazolin 5/18-5/19     Thoracoscopy & Decortication - 4/28  Decortication 5/17     PRIOR Rx:   Zosyn 4/22-4/23  Previous: Unasyn, Zosyn, Vanco, Daptomycin  Ceftaroline: start 4/13-4/15  Nafcillin 2g Q4 hrs 4/15-4/23 4/14: Unable to give name of previous NSG or neurologist. Seems confused, but able to say some sentences fluently.   4/15: Line cultures now growing MSSA. As well as from the blood. Repeat blood culture 4/13+ in both sets. Patient has worsening confusion. CSF fluid analyses suggest pleocytosis. Cultures are no growth from the CSF. Chest CT was ordered this morning indicating a loculated right pleural effusion as well as bilateral septic emboli. Dr. Mack spoke with Dr. Oh yesterday and no surgical intervention unless clinical deterioration.  4/16: Increased respiratory distress.  Tachypneic.  CT with loculated collection.  Dr Resendiz not available.  No thoracic surgeon available.  Plans to have pulmonary place CT.  Transferring to ICU.  4/17: Transferred to [facility] last night. Hgb dropped to 6.4 requiring PRBC transfusion. Requiring 2 pressors. Fio2 50%. Febrile 38.8. Pending OR decortication of empyema and hemothorax. [facility] at [facility] shows 8,371 WBC, ,000, 74% N  4/18: Chest tube was upsized by surgery yesterday, no decortication. WBC 22k. Fio2 40%. Vasopressin. Levophed down to 2mcg. Afebrile 24 hrs. Last fever 38.6 on 4/16.   4/19: Still on vent. Levo 3 mcg, vasopressin. Afebrile 72 hours. WBC 21k. BC 4/17 NGTD x 48 hours. Unable to do MRI brain given neurostimulator per RN.   4/20: Remaining afebrile. Hb 6.2 and undergoing transfusion today.WBC 24,100, 5% bands.1700 ml CT output. Copious bloody ET secretions. No pressors. Receiving dornase and TPA per CT. Right pleural effusion not large per CXR  today.   4/21: WBC 31k. Bronchoscopy yesterday showing blood. Cultures sent. TPA on hold per RN due to arvind blood from chest tube requiring PRBC transfusion for hgb 6. Pending chest CT today. Per , spinal stimulator is non-MRI compatible. Levophed 10mcg. 30% Fio2. Afebrile.   4/22: Fever 101.3 this am. WBC 20,300 down from 32,200 yesterday. BAL growing Klebsiella pneumoniae but susceptibility panel not set up until today. CTA of brain shows no lesion. CT of chest shows enlarging cavitary lung lesions bilat and large loculated new left pleural effusion and large hydropneumothorax on right. TPA & dornase infusions of right chest ended 4/20 after considerable bleeding requiring transfusion. Hb now down again to 6.8.  4/23: WBC 23k. Fio2 40%. Tmax 38.1. US of stiumlator shows small fluid collection but no drainable abscess. Pending MICAH today. Pending L chest tube placement  4/24: Continue fever 100.8-101.8. WBC 16,600. MICAH shows large vegetations on both tricuspid valve and mitral valve with mild TR & MR.  No aortic root abscess. Left chest tube placed yesterday yielding 8 ml of old bloody fluid. Bronch today revealed copious thick maroon secretions in distal trachea and both mainstem bronchi. On the right these were obstructive. Remaining off pressors. Last positive blood cultures (MSSA) were 4/16, negative 4/17.  4/25: Fever 100.6 this AM. wBC 13,300. Hb 6.6. CT: right hydropneumothorax increasing, right bronchopleural fistula, mediastinal shift ot the left , multiple cavitary pulmonary nodules, 3.1 cm left basilar mass, splenomegaly. CT of head shows dural thickening over the clivus. Lac+ GNR >100,000 col/ml growing from BAL of 4/24, presumed Klebsiella. Left peural fluid culture negative from 4/23.  4/26: Fever 100.4 last night. WBC 13,500. Hb 7.4. Plts 502,000. ESR >120. UA fairly clear except 30 mg% protein, 2-5 wbc and rare rbc. CXR unchanged except more prominent pulmonary edema. GNR in BAL may be a  different species of Klebsiella, and resistant to ampicillin & tmp-sulfa; confirmation pending.  4/28: Fever 100.8 last night. WBC 27,700. Hb 6.6. Plts 482,000. Creat 0.19. Kleb pneum in BAL on 4/24 is resistant to ampicillin & tmp-sulfa but otherwise sensitive. O2 sat 96% on FIO2 30% now, PEEP 8. Has been re-evaluated by thoracic surgeon, Dr. Ganser, who believes she will not wean without decortication on the right. Surgery anticipated today.  4/29: S/P right thoracoscopy with decortication with cultures NG at 24 hrs.  4/30: Had a bronch due to hypoxia and hypotension. CXR with worsening right hemithorax pulmonary opacities. Bloody mucous plugs removed. Pressors started. Febrile this am. Tachy in 130s. Pressors just removed. Tolerating vent, but not a SBT candidate at the moment.  5/1: Small air leak CT-2 every ~ 2/3 breathes. The K. pneumoniae from her thoracoscopy is sensitive to her cefepime so no antibiotic change needed.   5/2: No air leak today she tolerated 2  hrs of spontaneous breathing with support today.      5/3: Second day with SBT and doing well now for 2 hrs. Slight bump in temperature and      WBC's but no obvious clinical infectious changed so watch closely.      5/4: Fever still from time to time. WBCs trending up. Chest tubes in place. Trach.       5/5: She clearly is better today she was sitting up in bed with open eyes and follows commands. She still has low grade fever but her WBC's are dropping. Cefepime dose increased yesterday for increased CNS coverage if necessary. She will probably need further thoracic surgery as mentioned by Dr. Ganser. The issue of what to do with her stimulator is still up in the air for now as it probably will need to be removed but she is nort a candidate for more major surgery at this time.       5/7: She continues to improve and under go longer SBT trials. She just had a new PICC placed in her right arm and plan is to D/C central line.        5/8: PICC placed. No  fevers. Comfortable. Cortrak placed. Failing SBTs.  5/9: No acute issues. No fevers. Comfortable. Family at bedside.   5/10: No acute issues, fevers or WBC bumps. No changes in condition. She is to get her CT scan today and be reviewed by surgery  5/11:  at bedside.  Has been referred to LANE.  Repeat CT scan completed.  Results noted.  ? If Dr Ganser has seen.  Still with an empyema:  ? If candidate for further surgery.  5/12: Pending next thoracic surgery. No fever. Anxious.  5/13: ? Surgery date.  No one seems to know.  Had speaking valve in and having swallow eval with speech therapy  5/14: No acute issues. No fevers. Surgery Monday.  5/15: One CT accidentally pulled out yesterday.  Has been on T piece and tolerating. Plans for surgery Monday afternoon.  5/16: On schedule for surgery 5/17: 4:30 pm.  Moved to room T614.  No new issues.  5/17: Was sitting up in the bedside chair since change of shift.  Now walking in hallways with RN and CNA.  5/18: Decortication yesterday with 2 chest tube placement. WBC improving 21->14k  5/19: Hypotension and tachycardia.  Decreased H/H.  Placed back on vent to rest.  Dr Lozano in process of placing new line.  Antibiotics were deesculated yesterday to Cefazolin.  No cx were taken at surgery.  5/20: Pt was febrile yest afternoon 38.2, but now afebrile overnight after meropenem. WBC improved 11k->8k. Fio2 30%. Phenylephrine now off this morning. CXR shows new R consolidations.   5/21: Off pressors.  On vent: FiO2 30%, PEEP 8, PS 5 Received pRBCs yesterday.  5/22: Remains afebrile, off pressors. FiO2 30%.   5/23: Afebrile. FiO2 30%. Bronchoscopy yesterday normal.   5/24: No new cultures obtained at last bronchoscopy.  On  T piece this am: FiO2 30%.  5/25: Remaining afebrile. WBC 8600.   5/26: Chest tube removed today. Possible transfer to [facility].  5/27: O2 sat usuallyl 95% but intermittent desat to 87-89%.  Remaining afebrile.WBC 6100. Alk phos 641 but normal transaminases.    5/28: Pending [facility] transfer. NO new issues overnight  5/29: Awaiting bed at [facility]. Remaining afebrile. WBC 5800, creat 0.19.       Review of Systems  Review of Systems   Unable to perform ROS: Intubated   Gastrointestinal: Positive for abdominal pain (apparently mild.) and nausea.        Physical Exam  Temp:  [35.9 °C (96.7 °F)-36.7 °C (98 °F)] 35.9 °C (96.7 °F)  Pulse:  [] 99  Resp:  [16-18] 16  BP: ()/(62-85) 117/71  SpO2:  [94 %-97 %] 95 %    Physical Exam  Constitutional:       General: She is not in acute distress.     Comments: Weak, feeble and aphonic.   HENT:      Head: Normocephalic.      Mouth/Throat:      Mouth: Mucous membranes are dry.   Eyes:      Conjunctiva/sclera: Conjunctivae normal.      Pupils: Pupils are equal, round, and reactive to light.   Cardiovascular:      Rate and Rhythm: Normal rate and regular rhythm.      Heart sounds: No murmur heard.     Pulmonary:      Effort: Pulmonary effort is normal.      Breath sounds: Normal breath sounds.   Abdominal:      General: Abdomen is flat.      Palpations: Abdomen is soft.      Tenderness: There is no abdominal tenderness.   Skin:     General: Skin is warm and dry.   Neurological:      General: No focal deficit present.      Mental Status: She is alert.   Psychiatric:      Comments: Withdrawn.       Constitutional:       Appearance: Normal appearance. Awake and alert  HENT:      Head: Normocephalic.      Comments: NG tube  Neck:      Comments: tracheostomy  Cardiovascular:      Rate and Rhythm: Normal rate and regular rhythm.   Pulmonary:      Comments: dimninished in R base  Abdominal:      General: Abdomen is flat. Bowel sounds are normal.   Neurological:      Mental Status: She is alert and oriented to person, place, and time.       Laboratory  Recent Labs     05/27/21  0507 05/28/21  0410 05/29/21  0546   WBC 6.1 6.6 5.8   RBC 3.28* 3.58* 3.39*   HEMOGLOBIN 9.5* 10.1* 9.7*   HEMATOCRIT 30.0* 32.9* 31.6*   MCV 91.5 91.9 93.2   MCH 29.0  28.2 28.6   MCHC 31.7* 30.7* 30.7*   RDW 57.7* 57.4* 56.5*   PLATELETCT 419 438 425   MPV 9.1 9.1 8.9*     Recent Labs     05/27/21  0507 05/28/21  0410 05/29/21  0546   SODIUM 136 138 139   POTASSIUM 3.8 4.0 3.8   CHLORIDE 101 100 102   CO2 26 27 30   GLUCOSE 88 90 91   BUN 8 5* 4*   CREATININE 0.17* 0.22* 0.19*   CALCIUM 9.0 9.3 9.3                   Impression   -Severe sepsis  -Catheter related bloodstream infection due to infected port status post removal 4/12-staph aureus  -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       - Acute left empyema s/p chest tube placement 4/23, decortication 5/17, chest tube removal 5/26 - Klebsiella  -Multiple acute septic pulmonary emboli bilaterally  -Acute bilateral pneumonia due to above     --Pulmonary edema  -Pseudotumor cerebri with underlying  shunt: possible arachnoiditis  -Chronic migraine headaches  -History of autoimmune vasculitis  -Elevated alkaline phosphatase & bilirubin  -Acute encephalopathy due to sepsis      - anemia due to above          - acute fever on 5/19 likely from secondary VAP          - Secondary VAP R sided pneumonia          - decubitus ulcer sacral.      Plan   Tracheal aspirate recently 5/19 no growth, blood cultures 5/19 no growth so far. CXR shows worsening R sided infiltrates concerning for secondary infections after reviewing her CXR's between 5/18 and  5/19.  Repeated sputum cultures still reflecting her original Klebsiella empyema infection. She improved post operatively with defervescence and pressor weaning while on meropenem. Suggestive of either time, or      an anaerobic infection that we weren't covering. Continue meropenem for 7 to 8 days, then switch back to cefazolin or Unasyn to complete therapy for endocarditis.     - continue cefazolin 4 week target date after decortication 6/14/21  - Received Meropenem for  VAP for 7 days finished 5/26  - Will need repeat Chest CT right before completion of antibiotics  - after completion of IV abx  Will need suppression therapy afterward for her HW (spinal stimulator,  shunt  targeting MSSA with Keflex, for example).    - chest tube removed 5/26  - ESR, CRP  Dispo: Pt to transfer to [facility]. Micheal will follow patient there. Awaiting on bed availability     30 min spent  Case discussed with pt, RN

## 2021-05-29 NOTE — PROGRESS NOTES
· 2 RN skin check complete with CAROLINA Purcell.  · Devices in place: trach with T-piece, woundvac on R side,  probe, R upper PICC, purewick  · Skin assessed under devices: Yes  · Confirmed pressure ulcers found on: R big toe and left third toe.  · New potential pressure ulcers noted on: N/A  · The following interventions in place: waffle mattress in place, Q2 turns in place, waffle cushion in use for seat when out of bed, pillows in use for support/positioning, pillows in use to float heels, heel mepilex in use, barrier wipes in use, orange top cream in use, purewick in place to prevent incontinence dermatitis    Bilat ears intact, pink, and blanching. Tracheostomy in place, CDI. Bilat elbows intact, pink, and blanching. Wound vac attached to right lateral chest. Sacrum intact, pink, blanching. Excoriation and rash appearing over bilat buttocks, red and blanching. Groin pink and intact. DTI on right foot big toe, purple, black, and non-blanching. DTI on left foot third toe, purple, black, and non-blanching.

## 2021-05-29 NOTE — CARE PLAN
The patient is Watcher - Medium risk of patient condition declining or worsening    Shift Goals  Clinical Goals: Pain management  Patient Goals: pain control    Progress made toward(s) clinical / shift goals:  Non-pharmaceutical and pharmaceutical interventions in place to maintain pt's pain goal.    Patient is not progressing towards the following goals: N/A    Problem: Pain Management  Goal: Pain level will decrease to patient's comfort goal  Outcome: Progressing     Problem: Knowledge Deficit - Standard  Goal: Patient and family/care givers will demonstrate understanding of plan of care, disease process/condition, diagnostic tests and medications  Outcome: Progressing     Problem: Fall Risk  Goal: Patient will remain free from falls  Outcome: Progressing     Problem: Mobility  Goal: Patient's capacity to carry out activities will improve  Outcome: Progressing     Problem: Hemodynamics  Goal: Patient's hemodynamics, fluid balance and neurologic status will be stable or improve  Outcome: Progressing     Problem: Fluid Volume  Goal: Fluid volume balance will be maintained  Outcome: Progressing     Problem: Urinary - Renal Perfusion  Goal: Ability to achieve and maintain adequate renal perfusion and functioning will improve  Outcome: Progressing     Problem: Respiratory  Goal: Patient will achieve/maintain optimum respiratory ventilation and gas exchange  Outcome: Progressing     Problem: Physical Regulation  Goal: Diagnostic test results will improve  Outcome: Progressing  Goal: Signs and symptoms of infection will decrease  Outcome: Progressing

## 2021-05-29 NOTE — THERAPY
"Occupational Therapy  Daily Treatment     Patient Name: Enedelia Pang  Age:  50 y.o., Sex:  female  Medical Record #: [id number]  Today's Date: 5/28/2021     Precautions  Precautions: Fall Risk, Tracheostomy   Comments: wound vac     Assessment    Pt seen for OT tx session. Pt eager to mobilize and perform BADLs. Pt donned pants EOB w/ min A, assist primarily for threading feet though. Pt performed toileting and standing grooming at Min A level w/ assist for balance and safety. Pt continues to be below functional baseline. Will continue to follow.     Plan    Continue current treatment plan.    DC Equipment Recommendations: (P) Unable to determine at this time  Discharge Recommendations: (P) Recommend post-acute placement for additional occupational therapy services prior to discharge home    Subjective    \"I'm ok\"     Objective       05/28/21 1415   Total Time Spent                  Precautions   Precautions Fall Risk;Nasogastric Tube;Tracheostomy    Comments wound vac   Vitals   O2 (LPM) 5   O2 Delivery Device T-Piece   Vitals Comments RN and RT assisted w/ transitioning pt on to portable oxygen   Pain 0 - 10 Group   Therapist Pain Assessment Post Activity Pain Same as Prior to Activity;Nurse Notified   Cognition    Cognition / Consciousness WDL   Level of Consciousness Alert   Safety Awareness Impaired   Attention Impaired   Comments pleasent, cooperative, motivated   Balance   Sitting Balance (Static) Fair +   Sitting Balance (Dynamic) Fair   Standing Balance (Static) Fair -   Standing Balance (Dynamic) Fair -   Weight Shift Sitting Fair   Weight Shift Standing Fair   Skilled Intervention Tactile Cuing;Verbal Cuing;Facilitation   Comments w/ FWW   Bed Mobility    Supine to Sit Supervised   Sit to Supine   (NT in chair post )   Scooting Supervised   Skilled Intervention Verbal Cuing;Tactile Cuing   Activities of Daily Living   Grooming Minimal Assist;Standing  (washed hands at sink)   Lower Body Dressing Minimal " Assist  (donned pants)   Toileting Minimal Assist  (voided in toilet)   Skilled Intervention Tactile Cuing;Verbal Cuing;Compensatory Strategies   How much help from another person does the patient currently need...   Putting on and taking off regular lower body clothing? 3   Bathing (including washing, rinsing, and drying)? 3   Toileting, which includes using a toilet, bedpan, or urinal? 3   Putting on and taking off regular upper body clothing? 3   Taking care of personal grooming such as brushing teeth? 3   Eating meals? 1   6 Clicks Daily Activity Score 16   Functional Mobility   Sit to Stand Minimal Assist   Bed, Chair, Wheelchair Transfer Minimal Assist   Toilet Transfers Minimal Assist   Mobility within room and bathroom w/ FWW   Skilled Intervention Verbal Cuing;Facilitation   Activity Tolerance   Sitting in Chair 10+ min (up post)   Sitting Edge of Bed 10 min   Standing 8 min    Patient / Family Goals   Patient / Family Goal #1 to get better   Goal #1 Outcome Goal not met   Short Term Goals   Short Term Goal # 1 LB dressing with min A   Goal Outcome # 1 Goal met, new goal added   Short Term Goal # 1 B  Pt will demo LB dressing w/ SPV   Short Term Goal # 2 seated g/h with SPV   Goal Outcome # 2 Goal met, new goal added   Short Term Goal # 2 B  standing g/h with SPV   Goal Outcome # 2 B Progressing as expected   Short Term Goal # 3 bsc txf with mod A   Goal Outcome # 3 Goal met, new goal added   Short Term Goal # 3 B toilet txf with SPV and no v/cs for safety/technique   Goal Outcome # 3 B Goal not met   Education Group   Role of Occupational Therapist Patient Response Patient;Acceptance;Explanation;Reinforcement Needed;Action Demonstration   Transfers Patient Response Patient;Acceptance;Explanation;Action Demonstration;Reinforcement Needed   ADL Patient Response Patient;Acceptance;Explanation;Action Demonstration;Reinforcement Needed   Anticipated Discharge Equipment and Recommendations   DC Equipment  Recommendations Unable to determine at this time   Discharge Recommendations Recommend post-acute placement for additional occupational therapy services prior to discharge home   Interdisciplinary Plan of Care Collaboration   IDT Collaboration with  Nursing   Patient Position at End of Therapy Seated;Chair Alarm On;Call Light within Reach;Tray Table within Reach;Phone within Reach   Collaboration Comments report given   Session Information   Date / Session Number  5/28, 2 (2/3, 5/28)   Priority 2

## 2021-05-29 NOTE — CARE PLAN
Aerosol continues     5 L 30 %   Suction as need it.    Problem: Oxygenation:  Goal: Maintain adequate oxygenation dependent on patient condition  Outcome: Progressing     Problem: Bronchopulmonary Hygiene:  Goal: Increase mobilization of retained secretions  Outcome: Progressing

## 2021-05-29 NOTE — PROGRESS NOTES
Hospital Medicine Daily Progress Note    Date of Service  5/29/2021    Chief Complaint  50 y.o. female admitted 4/16/2021 with recurrent infections    Hospital Course   This is a 50-year-old female with a past medical history of pseudotumor cerebri s/p  shunt implantation by Dr. 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  [facility] infectious disease is managing antibiotics for the patient.  Dr. Candelaria surgery removed the port on 4/12.  The patient was further identified to have endocarditis of the tricuspid valve, a lumbar puncture performed on 4/14 showed pleocytosis with negative Gram stain.  The patient suffered worsening leukocytosis and was found to have septic emboli per CT.  Initially a small pleural catheter was placed.  Significant loculations were encountered.  MSSA empyema was diagnosed.  Neurosurgery was consulted to comment on possibly removal of a  shunt, this was felt not appropriate at this time.  The patient remained on mechanical ventilation for 15 days, then a perc trach was placed.  The patient was eventually liberated from mechanical ventilation, the patient did have a right thoracoscopy and decortication of the lung performed on 4/28 by Dr. Ganser.  The patient was of identified to have multiple sources for MSSA including mitral valve, tricuspid valve vegetation, port, spinal stimulator,  shunt possibly.  Patient underwent repeat chest surgery, decortication with 2 chest tube placement on 5/18, had to be placed back on mechanical ventilation after surgery, central access was replaced on 5/19, the patient treated  in ICU with eventual chest tube removal with assistance of thoracic surgery and was deescalated in terms of respiratory support down to trach collar.  [place] in need of extensive rehabilitation, question of LTAC level    Interval Problem Update  Patient seen and examined today.    Patient tolerating treatment and therapies.  All Data, Medication data reviewed.  Case discussed with nursing as available.  Plan of Care reviewed with patient and notified of changes.  5/26 the patient on T-piece, 30% FiO2, no increase in work of breathing, small amount of secretions, good cough, chest x-ray improving, chest surgeon removed both chest tubes, ongoing antibiotic treatment with assistance of [facility] infectious disease.  Transfer options being evaluated.  Afebrile, tachycardic with heart rate in the 1 teens, blood pressure is soft but maintaining MAP.  Laboratory data reviewed, electrolytes are balanced, no leukocytosis, improving hemoglobin, chest x-ray with stable lines and tubes, stable ill-defined bilateral pulmonary opacities, no large effusions  5/27 the patient appears better, she is ambulating, the patient was cleared for diet, the patient is awake alert and following, afebrile, blood pressure between 90 and 130, no increase in respiratory difficulty, afebrile, ongoing antibiotic treatment through 6/14  Ongoing rehab efforts, awaiting LTAC approval    5/28 patient is in bed, core track removed, patient is tolerating diet, family is at bedside, discussed with , LTAC pending, patient tachycardic any symptoms no fever, blood pressure is soft but stable.  5/29 patient in bed, no fever or chills. Tolerating diet, vs stable. On T piece o2 requirement stable.     Consultants/Specialty  ID  Thoracic surgery  Pulmonary critical care    Code Status  Full Code    Disposition  Rehab, hopefully LTAC    Review of Systems  Review of Systems   Unable to perform ROS: Other   Poor communication secondary to  tracheostomy    Physical Exam  Temp:  [35.9 °C (96.7 °F)-36.7 °C (98 °F)] 35.9 °C (96.7 °F)  Pulse:  [] 99  Resp:  [16-18] 16  BP: ()/(62-85) 94/62  SpO2:  [94 %-97 %] 95 %    Physical Exam  Vitals and nursing note reviewed.   Constitutional:       General: She is not in acute distress.     Appearance: She is well-developed. She is ill-appearing. She is not diaphoretic.   HENT:      Head: Normocephalic and atraumatic.   Eyes:      General:         Right eye: No discharge.         Left eye: No discharge.   Neck:      Vascular: No JVD.   Cardiovascular:      Rate and Rhythm: Regular rhythm. Tachycardia present.   Pulmonary:      Effort: Pulmonary effort is normal. No respiratory distress.      Breath sounds: No stridor. Rales present.      Comments: Trach without inflammation  Abdominal:      Palpations: Abdomen is soft.      Tenderness: There is no guarding or rebound.   Skin:     General: Skin is warm and dry.      Coloration: Skin is pale.      Findings: No rash.   Neurological:      Mental Status: She is alert and oriented to person, place, and time.      Motor: Weakness present.   Psychiatric:         Thought Content: Thought content normal.         Fluids    Intake/Output Summary (Last 24 hours) at 5/29/2021 1358  Last data filed at 5/29/2021 1000  Gross per 24 hour   Intake 390 ml   Output 800 ml   Net -410 ml       Laboratory  Recent Labs     05/27/21  0507 05/28/21  0410 05/29/21  0546   WBC 6.1 6.6 5.8   RBC 3.28* 3.58* 3.39*   HEMOGLOBIN 9.5* 10.1* 9.7*   HEMATOCRIT 30.0* 32.9* 31.6*   MCV 91.5 91.9 93.2   MCH 29.0 28.2 28.6   MCHC 31.7* 30.7* 30.7*   RDW 57.7* 57.4* 56.5*   PLATELETCT 419 438 425   MPV 9.1 9.1 8.9*     Recent Labs     05/27/21  0507 05/28/21  0410 05/29/21  0546   SODIUM 136 138 139   POTASSIUM 3.8 4.0 3.8   CHLORIDE 101 100 102   CO2 26 27 30   GLUCOSE 88 90 91   BUN 8 5* 4*   CREATININE 0.17* 0.22* 0.19*   CALCIUM 9.0 9.3 9.3                   Imaging  DX-CHEST-PORTABLE (1  VIEW)   Final Result         1. Stable lines and tubes.   2. Stable ill-defined bilateral pulmonary opacities, right worse than left. No large pleural effusions.         DX-CHEST-PORTABLE (1 VIEW)   Final Result      1.  Decrease in volume of right pleural fluid collection/empyema with 2 right chest tubes in place. No pneumothorax identified.      2.  No focal consolidation in the left lung.      DX-CHEST-PORTABLE (1 VIEW)   Final Result      No significant interval change.      DX-CHEST-PORTABLE (1 VIEW)   Final Result         1.  Hazy right pulmonary infiltrates and/or effusion, stable compared to prior study.      DX-CHEST-PORTABLE (1 VIEW)   Final Result         1.  Hazy right pulmonary infiltrates and/or effusion, stable compared to prior study.      DX-CHEST-PORTABLE (1 VIEW)   Final Result      1.  All lines and tubes appear appropriately located      2.  Consolidation throughout the right lung consistent with pneumonia      DX-CHEST-PORTABLE (1 VIEW)   Final Result         1.  Hazy right pulmonary infiltrates and/or effusion, increased compared to prior study.      DX-ABDOMEN FOR TUBE PLACEMENT   Final Result      Feeding tube looped in the stomach.      DX-CHEST-PORTABLE (1 VIEW)   Final Result         1. Stable lines and tubes. No pneumothorax detected.   2. Persistent opacities in the right perihilar region and in the right lung periphery, similar to prior study. Left lung is essentially clear.      DX-CHEST-PORTABLE (1 VIEW)   Final Result      Postoperative changes of the right chest with placement of an additional chest tube. There is improved aeration in the right lung. No significant pleural effusion or definite pneumothorax.      Right IJ central venous catheter tip projects over the proximal right atrium.      Hypoinflation with interstitial and hazy pulmonary opacities.      DX-CHEST-PORTABLE (1 VIEW)   Final Result         1.  Hazy right pulmonary infiltrates and/or effusion, similar compared to  prior study.      DX-ABDOMEN FOR TUBE PLACEMENT   Final Result      Feeding tube tip projects over the expected location the gastric antrum.      DX-CHEST-PORTABLE (1 VIEW)   Final Result         1.  Hazy right pulmonary infiltrates and/or effusion, similar compared to prior study.      DX-CHEST-LIMITED (1 VIEW)   Final Result      1.  No pneumothorax. Only one chest tube is now seen in the right lung.   2.  The chest is otherwise stable.      DX-CHEST-PORTABLE (1 VIEW)   Final Result         1.  Hazy right pulmonary infiltrates and/or effusion, similar compared to prior study.      DX-CHEST-PORTABLE (1 VIEW)   Final Result         1.  Hazy right pulmonary infiltrates and/or effusion, similar compared to prior study.      DX-ABDOMEN FOR TUBE PLACEMENT   Final Result         1.  Nonspecific bowel gas pattern.   2.  Dobbhoff tube tip terminates overlying the expected location of the gastric antrum.   3.  Hazy bilateral lung base infiltrates, greater on the right.      DX-CHEST-PORTABLE (1 VIEW)   Final Result         1.  Hazy right pulmonary infiltrates and/or effusion, similar compared to prior study.      US-RUQ   Final Result         1.  Hepatomegaly   2.  Mild intrahepatic and extrahepatic biliary ductal dilatation, commonly associated with postcholecystectomy physiology, consider causes of biliary obstruction with additional workup as clinically appropriate      CT-CHEST,ABDOMEN,PELVIS WITH   Final Result      1.  Large empyema in the RIGHT hemithorax, slightly decreased in size from prior exam with chest tubes present.   2.  Consolidation remains.   3.  Multiple cavitary lesions again seen in the LEFT upper lobe, minimally decreased from prior, concerning for septic emboli.   4.  Supportive tubing is unchanged.   5.  Intrahepatic and extrahepatic biliary dilation, likely postoperative although distal stricture, stone or mass remain possibilities.   6.  Moderate pelvic fluid, nonspecific.   7.  No evidence of  bowel obstruction or perforation.      DX-CHEST-PORTABLE (1 VIEW)   Final Result         1.  Hazy right pulmonary infiltrates and/or effusion, similar compared to prior study.      DX-CHEST-PORTABLE (1 VIEW)   Final Result         No significant interval change.            DX-CHEST-PORTABLE (1 VIEW)   Final Result         Interval removal of left central venous catheter. Interval adjustment of right PICC with tip in the SVC.         DX-ABDOMEN FOR TUBE PLACEMENT   Final Result      Feeding tube tip at the mid to distal stomach.      IR-PICC LINE PLACEMENT W/ GUIDANCE > AGE 5   Final Result                  Ultrasound-guided PICC placement performed by qualified nursing staff as    above.          DX-CHEST-PORTABLE (1 VIEW)   Final Result      1.  Stable cardiopulmonary findings.   2.  See comments above regarding the right-sided PICC position.      DX-CHEST-PORTABLE (1 VIEW)   Final Result      Stable chest findings compared to 5/5.      DX-CHEST-PORTABLE (1 VIEW)   Final Result      Stable chest findings compared with 5/3.      DX-ABDOMEN FOR TUBE PLACEMENT   Final Result         Feeding tube with tip projecting over the expected area of the stomach.      DX-CHEST-PORTABLE (1 VIEW)   Final Result      Stable chest findings compared with prior.      DX-CHEST-PORTABLE (1 VIEW)   Final Result         1. No significant interval change.      US-EXTREMITY ARTERY LOWER UNILAT RIGHT   Final Result      DX-CHEST-PORTABLE (1 VIEW)   Final Result         1. No significant interval change.      IR-PICC LINE PLACEMENT W/ GUIDANCE > AGE 5   Final Result                  Ultrasound-guided PICC placement performed by qualified nursing staff as    above.          DX-CHEST-PORTABLE (1 VIEW)   Final Result         1.  Pulmonary edema and/or infiltrates, similar to prior study.   2.  Stable opacification right lung, likely effusion. Thoracostomy tubes are in place.   3.  Multiple cavitary appearing left pulmonary lesions, see CT  performed April 27, 2021 for further characterization      DX-CHEST-PORTABLE (1 VIEW)   Final Result         1.  Pulmonary edema and/or infiltrates, similar to prior study.   2.  Single opacification right lung, likely effusion. Thoracostomy tubes are in place.   3.  Multiple cavitary appearing left pulmonary lesions, see CT performed April 27, 2021 for further characterization   4.  Soft tissue gas in the right chest wall      DX-CHEST-PORTABLE (1 VIEW)   Final Result         1.  Pulmonary edema and/or infiltrates, similar to prior study.   2.  Increased opacification right lung, likely increased effusion. Thoracostomy tubes are in place.   3.  Multiple cavitary appearing left pulmonary lesions, see CT performed April 27, 2021 for further characterization   4.  Soft tissue gas in the right chest wall      DX-CHEST-PORTABLE (1 VIEW)   Final Result         1.  Pulmonary edema and/or infiltrates, similar to prior study.   2.  Right pneumothorax, stable compared to prior study, right thoracostomy tubes remain in place   3.  Multiple cavitary appearing left pulmonary lesions, see CT performed April 27, 2021 for further characterization   4.  Soft tissue gas in the right chest wall      DX-CHEST-PORTABLE (1 VIEW)   Final Result         1.  Pulmonary edema and/or infiltrates, similar to prior study.   2.  Right pneumothorax, interval decreased compared to prior study, interval placement of right thoracostomy tubes is noted.   3.  Multiple cavitary appearing left pulmonary lesions, see CT performed yesterday for further characterization   4.  Soft tissue gas in the right chest wall      DX-CHEST-PORTABLE (1 VIEW)   Final Result         1.  Pulmonary edema and/or infiltrates, similar to prior study.   2.  Right pneumothorax, stable compared to prior study, interval removal of right thoracostomy tube is noted.   3.  Multiple cavitary appearing left pulmonary lesions, see CT performed yesterday for further characterization    4.  Soft tissue gas in the right chest wall      DX-CHEST-LIMITED (1 VIEW)   Final Result         No significant interval change.      CT-CTA CHEST PULMONARY ARTERY W/ RECONS   Final Result         No CT evidence of pulmonary embolus.      Previous right chest tube were removed.      Grossly similar in size of the loculated right hydropneumothorax but there is increased layering fluid component. Unchanged mild shift of the mediastinal to the left.      Redemonstration of a pigtail catheter in the medial left lung base. Grossly similar multiple cavitary lesions in the left lung.      US-EXTREMITY VENOUS LOWER BILAT   Final Result      POCT BUTTERFLY-DUPLEX SCAN EXTREMITY VEINS LIMITED   Final Result      DX-CHEST-PORTABLE (1 VIEW)   Final Result         1.  Pulmonary edema and/or infiltrates, similar to prior study.   2.  Right pneumothorax, somewhat decreased to prior study, interval removal of right thoracostomy tube is noted.   3.  Multiple cavitary appearing left pulmonary lesions, see CT performed yesterday for further characterization   4.  Soft tissue gas in the right chest wall      DX-CHEST-PORTABLE (1 VIEW)   Final Result         1.  Pulmonary edema and/or infiltrates, similar to prior study.   2.  Right pneumothorax, similar to prior study with thoracostomy tube in place.   3.  Multiple cavitary appearing left pulmonary lesions, see CT performed yesterday for further characterization      CT-CHEST (THORAX) W/O   Final Result      Large loculated right hydropneumothorax with interval increase in size of the pneumothorax and pleural fluid.      Right chest tube is in the posterior right thorax.      There is mediastinal shift to the left compatible with tension.      Small pericardial effusion.      Small loculated left pleural effusion with overlying atelectasis/consolidation.   Pigtail catheter is seen at the medial left lung base. Pleural effusion has decreased in size compared to prior.      There are  multiple foci of air extending from the right lung to the pleural space suspicious for a bronchopleural fistula.      Multiple cavitary lesions bilaterally with mild interval decrease of the solid component of the cavitary nodules.      Noncavitary 3.1 cm masslike density is seen at the left lung base.      Findings may be related to septic emboli, malignancy or multifocal abscesses. Short interval follow-up recommended.      Soft tissue air on the right is again seen.      Splenomegaly      Findings discussed with Leti Sun.      DX-CHEST-PORTABLE (1 VIEW)   Final Result      No significant change from prior exam.      CT-HEAD W/O   Final Result      1.  RIGHT frontal ventriculostomy catheter unchanged in position.   2.  Mild cortical atrophy.   3.  No intracranial hemorrhage.   4.  Apparent dural thickening overlying the clivus.  Consider further evaluation with contrast-enhanced MRI.      DX-CHEST-LIMITED (1 VIEW)   Final Result      Interval left basilar pigtail catheter with diminished atelectasis, pleural fluid      Stable right hydropneumothorax with thoracostomy tube in place, considerable soft tissue gas and partial lung collapse      IR-CHEST TUBE-EMPYEMA LEFT   Final Result      1.  CT GUIDED LEFT  10 [place] PIGTAIL CHEST TUBE PLACEMENT FOR POSSIBLE EMPYEMA YIELDING OLD BLOODY FLUID.      2. A CHEST RADIOGRAPH IS FORTHCOMING.      EC-MICAH W/O CONT   Final Result      US-ABDOMEN LTD (SOFT TISSUE)   Final Result      No fluid seen surrounding the electronic implanted spinal stimulator device.      DX-CHEST-LIMITED (1 VIEW)   Final Result      1.  Large right hydropneumothorax with right-sided chest tube in place, likely stable to slightly decreased from prior study.   2.  Small left pleural effusion. Mild improved aeration in the left lung.   3.  Bilateral pulmonary opacities which could be related to combination of atelectasis, edema and/or infection..      CT-CTA HEAD WITH & W/O-POST PROCESS   Final  Result      1.  Patent carotid and vertebral arteries.      2.  Again seen right frontal the jugular peritoneal shunt catheter. There is mild increase in volume of the lateral and third ventricles.      CT-CHEST (THORAX) W/O   Final Result      1.  Interval placement of a right-sided chest tube into a large loculated right-sided pleural effusion. There is now seen a large right-sided hydropneumothorax in the area that previously seen fluid. The chest tube extends into that hydropneumothorax.    There is some residual pleural fluid seen as well. A hydropneumothorax is somewhat loculated with components most prominently inferiorly and medially.      2.  New loculated left pleural effusion.      3.  Again seen multiple bilateral cavitary pulmonary masses which are more prominent than previously seen with increasing cavitation and measure up to 3 cm size. Differential diagnosis includes septic emboli, multifocal abscesses and neoplasm.      4.  Large amount of subcutaneous emphysema on the right.      5.  Splenomegaly.      6.  Multiple support devices.      Fleischner Society pulmonary nodule recommendations:         DX-CHEST-LIMITED (1 VIEW)   Final Result      1.  Support devices as described above.      2.  Right chest tube present with a again seen right-sided pneumothorax, possibly increased in size and loculated.      3.  Worsening bilateral pulmonary infiltrates.      DX-ABDOMEN FOR TUBE PLACEMENT   Final Result      Cortrak feeding tube tip projects in the region of the duodenal bulb.      DX-CHEST-LIMITED (1 VIEW)   Final Result      Loculated right pneumothorax is decreased in size compared to prior.      Small left pleural effusion.      Small loculated right pleural effusion.      Extensive bilateral airspace opacities are not significantly changed.      Soft tissue air on the right is again noted.      DX-CHEST-LIMITED (1 VIEW)   Final Result      Decreased partially loculated right pleural fluid with  increased pleural air. Right chest tube is in place.      Increased hazy opacity in the left lung possibly atelectasis.         US-ABDOMEN LTD (SOFT TISSUE)   Final Result      No drainable fluid collection.      DX-CHEST-PORTABLE (1 VIEW)   Final Result      Decreased partially loculated right pleural fluid with increased pleural air. Right chest tube is in place.      No other significant change.      DX-CHEST-PORTABLE (1 VIEW)   Final Result         1.  Pulmonary edema and/or infiltrates are identified, which are somewhat decreased since the prior exam.   2.  Moderate loculated appearing right pleural effusion, slightly decreased since prior      DX-ABDOMEN FOR TUBE PLACEMENT   Final Result      Feeding tube tip at the distal stomach.      DX-CHEST-PORTABLE (1 VIEW)   Final Result         1.  New chest tube is noted in the right lower hemithorax.      2.  Right pleural effusion is again identified which appears similar to the prior exam.      3.  No new infiltrates or consolidations are identified.      DX-CHEST-PORTABLE (1 VIEW)   Final Result         1.  Pulmonary edema and/or infiltrates are identified, which are stable since the prior exam.   2.  Moderate loculated appearing right pleural effusion      DX-CHEST-PORTABLE (1 VIEW)   Final Result         No significant interval change.      POCT BUTTERFLY-ECHOCARDIOGRAM LTD W/O CONT    (Results Pending)        Assessment/Plan  * Empyema of right pleural space (HCC)- (present on admission)  Assessment & Plan  Patient s/p multiple chest tubes, thoracotomy and decortication  Now s/p second decortication 5/17      Debility  Assessment & Plan  Acute on chronic, the patient likely need of LTAC treatment post acute treatment    Elevated alkaline phosphatase level- (present on admission)  Assessment & Plan  The patient not able to tolerate a MRCP at this time, ultrasound grossly unremarkable, observe    Agitation requiring sedation protocol- (present on  admission)  Assessment & Plan  Monitor,    Goals of care, counseling/discussion  Assessment & Plan  Patient with overall high risk of morbidity and mortality given her gravely ill state and prior chronic medical conditions  Palliative care following    Spinal cord stimulator status  Assessment & Plan  Patient's stimulator is [employer]. It is FDA approved as MRI compatible, but the company has gone out of business, so there is no support team to assist with MRI.  Thus, if MRI were performed, our radiologists would need to protocol it correctly to manage the stimulator. The former rep from [employer] is Andre, [phone].  Information obtained from Dr. Mahoney's office, [phone].     Per  (5/4/2021), it is not MRI compatible unfortunately.     Thrombocytosis (HCC)  Assessment & Plan  Acute reaction since resolved    Anasarca- (present on admission)  Assessment & Plan  Improved nutritional status, skin protective measures    Trapped lung  Assessment & Plan  Now s/p decortication   Chest tubes removed on 5/26, observe    Pulmonary abscess (HCC)  Assessment & Plan  From septic emboli  Now s/p decortication    Fever  Assessment & Plan  Resolved, monitor, antibiotic therapy    Atelectasis  Assessment & Plan  Pulmonary recruiting, respiratory care    Pneumonia  Assessment & Plan  MSSA  Ancef per ID    History of vasculitis- (present on admission)  Assessment & Plan  Does not appear to be a current issue    Bacteremia due to methicillin susceptible Staphylococcus aureus (MSSA)- (present on admission)  Assessment & Plan  Infectious disease assisting with antibiotic treatment  Multiple potential sources  Endocarditis  ID following    CSF pleocytosis- (present on admission)  Assessment & Plan  Infectious disease managing    Acute bacterial endocarditis- (present on admission)  Assessment & Plan  Mitral valve, tricuspid valve  MSSA  Ongoing antibiotic therapy for acute infection and chronic MSSA infection as per  infectious disease    Acute blood loss anemia  Assessment & Plan  Likely secondary to hemothorax  300 cc of blood removed after chest tube placed at [facility]  Trend hemoglobin    Prolonged Q-T interval on ECG- (present on admission)  Assessment & Plan  Resolved after correcting metabolic issues  Cont to follow   Monitor electrolytes, acidosis etc    Acute respiratory failure with hypoxia (HCC)- (present on admission)  Assessment & Plan  Secondary to MSSA bacteremia, empyema, pulmonary septic emboli  S/p tracheostomy  Ongoing respiratory care, pulmonary toilet, antibiotic treatment  Prolonged ventilator dependence  Will need LTAC    History of complicated migraines- (present on admission)  Assessment & Plan  Monitor    GERD (gastroesophageal reflux disease)- (present on admission)  Assessment & Plan  History of, as needed treatment    Hyponatremia  Assessment & Plan  Resolved  Follow daily    Tachycardia  Assessment & Plan  Persistent, likely secondary with multiple underlying ongoing conditions  Monitor  Stable    Pseudotumor cerebri  Assessment & Plan  History of  shunt, neurosurgery opted against removal    H/O: CVA (cerebrovascular accident)- (present on admission)  Assessment & Plan  Monitor    Chronic pain syndrome- (present on admission)  Assessment & Plan  Pre-existing, pain management    Port or reservoir infection  Assessment & Plan  S/p removal    Thrombocytopenia (HCC)- (present on admission)  Assessment & Plan  Transient, resolved    Anemia- (present on admission)  Assessment & Plan  Monitor, transfuse as indicated    Septic shock (HCC)- (present on admission)  Assessment & Plan  Multiple sources, recovered with aggressive treatment, IV antibiotics long-term  Resolved    Sepsis (HCC)  Assessment & Plan  Multiple sources possible, recovered with medical treatment  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 Oh felt there is currently no need to remove    Acute encephalopathy- (present on admission)  Assessment & Plan  Per neurosurgery no need to remove  shunt  Improving with ongoing medical treatment  Improved           VTE prophylaxis: enoxaparin    Case and plan of care discussed with nurse staff, family and during multidisciplinary rounds.    Patient is has a high medical complexity and is at high risk for complication, morbidity, and mortality.

## 2021-05-29 NOTE — PROGRESS NOTES
Assumed care of patient at bedside report from DAY RN. Updated on POC. Patient currently A & O x 4; on 5 L O2 trach; up with one assist; with complaints of acute pain. Call light within reach. Whiteboard updated. Fall precautions in place. Bed locked and in lowest position. All questions answered. No other needs indicated at this time.

## 2021-05-30 ENCOUNTER — APPOINTMENT (OUTPATIENT)
Dept: RADIOLOGY | Facility: MEDICAL CENTER | Age: 51
DRG: 003 | End: 2021-05-30
Attending: HOSPITALIST
Payer: MEDICARE

## 2021-05-30 LAB
CRP SERPL HS-MCNC: 3.85 MG/DL (ref 0–0.75)
ERYTHROCYTE [SEDIMENTATION RATE] IN BLOOD BY WESTERGREN METHOD: 102 MM/HOUR (ref 0–25)

## 2021-05-30 PROCEDURE — 700102 HCHG RX REV CODE 250 W/ 637 OVERRIDE(OP): Performed by: HOSPITALIST

## 2021-05-30 PROCEDURE — 94760 N-INVAS EAR/PLS OXIMETRY 1: CPT

## 2021-05-30 PROCEDURE — 31502 CHANGE OF WINDPIPE AIRWAY: CPT

## 2021-05-30 PROCEDURE — 85652 RBC SED RATE AUTOMATED: CPT

## 2021-05-30 PROCEDURE — 99232 SBSQ HOSP IP/OBS MODERATE 35: CPT | Performed by: HOSPITALIST

## 2021-05-30 PROCEDURE — 71045 X-RAY EXAM CHEST 1 VIEW: CPT

## 2021-05-30 PROCEDURE — 700111 HCHG RX REV CODE 636 W/ 250 OVERRIDE (IP): Performed by: INTERNAL MEDICINE

## 2021-05-30 PROCEDURE — 86140 C-REACTIVE PROTEIN: CPT

## 2021-05-30 PROCEDURE — 770020 HCHG ROOM/CARE - TELE (206)

## 2021-05-30 PROCEDURE — A9270 NON-COVERED ITEM OR SERVICE: HCPCS | Performed by: HOSPITALIST

## 2021-05-30 PROCEDURE — 94640 AIRWAY INHALATION TREATMENT: CPT

## 2021-05-30 RX ORDER — KETOROLAC TROMETHAMINE 30 MG/ML
30 INJECTION, SOLUTION INTRAMUSCULAR; INTRAVENOUS ONCE
Status: COMPLETED | OUTPATIENT
Start: 2021-05-30 | End: 2021-05-31

## 2021-05-30 RX ORDER — DIPHENHYDRAMINE HYDROCHLORIDE, ZINC ACETATE 2; .1 G/100G; G/100G
CREAM TOPICAL 3 TIMES DAILY PRN
Status: DISCONTINUED | OUTPATIENT
Start: 2021-05-30 | End: 2021-05-31

## 2021-05-30 RX ADMIN — CEFAZOLIN SODIUM 2 G: 2 INJECTION, SOLUTION INTRAVENOUS at 20:29

## 2021-05-30 RX ADMIN — GABAPENTIN 300 MG: 300 CAPSULE ORAL at 14:57

## 2021-05-30 RX ADMIN — DIAZEPAM 5 MG: 5 TABLET ORAL at 20:30

## 2021-05-30 RX ADMIN — RISPERIDONE 2 MG: 2 TABLET ORAL at 04:36

## 2021-05-30 RX ADMIN — ACETAMINOPHEN 500 MG: 325 TABLET ORAL at 20:30

## 2021-05-30 RX ADMIN — OXYCODONE 5 MG: 5 TABLET ORAL at 11:36

## 2021-05-30 RX ADMIN — GABAPENTIN 300 MG: 300 CAPSULE ORAL at 04:37

## 2021-05-30 RX ADMIN — CEFAZOLIN SODIUM 2 G: 2 INJECTION, SOLUTION INTRAVENOUS at 04:36

## 2021-05-30 RX ADMIN — OXYCODONE 5 MG: 5 TABLET ORAL at 20:30

## 2021-05-30 RX ADMIN — OXYCODONE 5 MG: 5 TABLET ORAL at 03:30

## 2021-05-30 RX ADMIN — DIVALPROEX SODIUM 500 MG: 125 CAPSULE ORAL at 20:30

## 2021-05-30 RX ADMIN — GABAPENTIN 300 MG: 300 CAPSULE ORAL at 20:30

## 2021-05-30 RX ADMIN — ACETAMINOPHEN 500 MG: 325 TABLET ORAL at 07:51

## 2021-05-30 RX ADMIN — DIVALPROEX SODIUM 500 MG: 125 CAPSULE ORAL at 14:57

## 2021-05-30 RX ADMIN — OXYCODONE 5 MG: 5 TABLET ORAL at 15:00

## 2021-05-30 RX ADMIN — OXYCODONE 5 MG: 5 TABLET ORAL at 07:51

## 2021-05-30 RX ADMIN — ONDANSETRON 4 MG: 2 INJECTION INTRAMUSCULAR; INTRAVENOUS at 15:03

## 2021-05-30 RX ADMIN — DULOXETINE HYDROCHLORIDE 60 MG: 60 CAPSULE, DELAYED RELEASE ORAL at 04:37

## 2021-05-30 RX ADMIN — METHADONE HYDROCHLORIDE 30 MG: 10 TABLET ORAL at 04:36

## 2021-05-30 RX ADMIN — DULOXETINE HYDROCHLORIDE 60 MG: 60 CAPSULE, DELAYED RELEASE ORAL at 17:41

## 2021-05-30 RX ADMIN — DIVALPROEX SODIUM 500 MG: 125 CAPSULE ORAL at 04:36

## 2021-05-30 RX ADMIN — ACETAMINOPHEN 650 MG: 325 TABLET, FILM COATED ORAL at 04:57

## 2021-05-30 RX ADMIN — PAROXETINE HYDROCHLORIDE 10 MG: 10 TABLET, FILM COATED ORAL at 04:36

## 2021-05-30 RX ADMIN — ACETAMINOPHEN 500 MG: 325 TABLET ORAL at 14:57

## 2021-05-30 RX ADMIN — CEFAZOLIN SODIUM 2 G: 2 INJECTION, SOLUTION INTRAVENOUS at 14:57

## 2021-05-30 RX ADMIN — ENOXAPARIN SODIUM 40 MG: 40 INJECTION SUBCUTANEOUS at 04:36

## 2021-05-30 RX ADMIN — RISPERIDONE 2 MG: 2 TABLET ORAL at 17:41

## 2021-05-30 ASSESSMENT — LIFESTYLE VARIABLES
TOTAL SCORE: 0
TOTAL SCORE: 0
ON A TYPICAL DAY WHEN YOU DRINK ALCOHOL HOW MANY DRINKS DO YOU HAVE: 0
HOW MANY TIMES IN THE PAST YEAR HAVE YOU HAD 5 OR MORE DRINKS IN A DAY: 0
CONSUMPTION TOTAL: NEGATIVE
EVER FELT BAD OR GUILTY ABOUT YOUR DRINKING: NO
ALCOHOL_USE: NO
HAVE PEOPLE ANNOYED YOU BY CRITICIZING YOUR DRINKING: NO
HAVE YOU EVER FELT YOU SHOULD CUT DOWN ON YOUR DRINKING: NO
TOTAL SCORE: 0
AVERAGE NUMBER OF DAYS PER WEEK YOU HAVE A DRINK CONTAINING ALCOHOL: 0
EVER HAD A DRINK FIRST THING IN THE MORNING TO STEADY YOUR NERVES TO GET RID OF A HANGOVER: NO

## 2021-05-30 ASSESSMENT — PAIN DESCRIPTION - PAIN TYPE
TYPE: ACUTE PAIN

## 2021-05-30 NOTE — PROGRESS NOTES
Hospital Medicine Daily Progress Note    Date of Service  5/30/2021    Chief Complaint  50 y.o. female admitted 4/16/2021 with recurrent infections    Hospital Course   This is a 50-year-old female with a past medical history of pseudotumor cerebri s/p  shunt implantation by Dr. 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  [facility] infectious disease is managing antibiotics for the patient.  Dr. Candelaria surgery removed the port on 4/12.  The patient was further identified to have endocarditis of the tricuspid valve, a lumbar puncture performed on 4/14 showed pleocytosis with negative Gram stain.  The patient suffered worsening leukocytosis and was found to have septic emboli per CT.  Initially a small pleural catheter was placed.  Significant loculations were encountered.  MSSA empyema was diagnosed.  Neurosurgery was consulted to comment on possibly removal of a  shunt, this was felt not appropriate at this time.  The patient remained on mechanical ventilation for 15 days, then a perc trach was placed.  The patient was eventually liberated from mechanical ventilation, the patient did have a right thoracoscopy and decortication of the lung performed on 4/28 by Dr. Ganser.  The patient was of identified to have multiple sources for MSSA including mitral valve, tricuspid valve vegetation, port, spinal stimulator,  shunt possibly.  Patient underwent repeat chest surgery, decortication with 2 chest tube placement on 5/18, had to be placed back on mechanical ventilation after surgery, central access was replaced on 5/19, the patient treated  in ICU with eventual chest tube removal with assistance of thoracic surgery and was deescalated in terms of respiratory support down to trach collar.  [place] in need of extensive rehabilitation, question of LTAC level    Interval Problem Update  Patient seen and examined today.    Patient tolerating treatment and therapies.  All Data, Medication data reviewed.  Case discussed with nursing as available.  Plan of Care reviewed with patient and notified of changes.  5/26 the patient on T-piece, 30% FiO2, no increase in work of breathing, small amount of secretions, good cough, chest x-ray improving, chest surgeon removed both chest tubes, ongoing antibiotic treatment with assistance of [facility] infectious disease.  Transfer options being evaluated.  Afebrile, tachycardic with heart rate in the 1 teens, blood pressure is soft but maintaining MAP.  Laboratory data reviewed, electrolytes are balanced, no leukocytosis, improving hemoglobin, chest x-ray with stable lines and tubes, stable ill-defined bilateral pulmonary opacities, no large effusions  5/27 the patient appears better, she is ambulating, the patient was cleared for diet, the patient is awake alert and following, afebrile, blood pressure between 90 and 130, no increase in respiratory difficulty, afebrile, ongoing antibiotic treatment through 6/14  Ongoing rehab efforts, awaiting LTAC approval    5/28 patient is in bed, core track removed, patient is tolerating diet, family is at bedside, discussed with , LTAC pending, patient tachycardic any symptoms no fever, blood pressure is soft but stable.  5/29 patient in bed, no fever or chills. Tolerating diet, vs stable. On T piece o2 requirement stable.   5/30 in bed looks comfortable, not in pain, o2 requirement increasing I will ordered cxr, discussed with RT today, continue iv abx per ID.     Consultants/Specialty  ID  Thoracic surgery  Pulmonary critical care    Code Status  Full  Code    Disposition  Rehab, hopefully LTAC    Review of Systems  Review of Systems   Unable to perform ROS: Other   Poor communication secondary to tracheostomy    Physical Exam  Temp:  [35.8 °C (96.5 °F)-36.4 °C (97.6 °F)] 36.1 °C (97 °F)  Pulse:  [] 106  Resp:  [16-18] 18  BP: ()/() 92/55  SpO2:  [91 %-99 %] 95 %    Physical Exam  Vitals and nursing note reviewed.   Constitutional:       General: She is not in acute distress.     Appearance: She is well-developed. She is ill-appearing. She is not diaphoretic.   HENT:      Head: Normocephalic and atraumatic.   Eyes:      General: No scleral icterus.  Neck:      Vascular: No JVD.   Cardiovascular:      Rate and Rhythm: Regular rhythm. Tachycardia present.   Pulmonary:      Effort: Pulmonary effort is normal. No respiratory distress.      Breath sounds: No stridor. Rales present.      Comments: Trach without inflammation  Abdominal:      General: There is no distension.      Palpations: Abdomen is soft.      Tenderness: There is no abdominal tenderness. There is no guarding.   Skin:     General: Skin is warm and dry.      Coloration: Skin is pale.      Findings: No rash.   Neurological:      Mental Status: She is alert and oriented to person, place, and time.      Motor: Weakness present.   Psychiatric:         Thought Content: Thought content normal.         Fluids    Intake/Output Summary (Last 24 hours) at 5/30/2021 1234  Last data filed at 5/29/2021 1930  Gross per 24 hour   Intake 360 ml   Output 0 ml   Net 360 ml       Laboratory  Recent Labs     05/28/21  0410 05/29/21  0546   WBC 6.6 5.8   RBC 3.58* 3.39*   HEMOGLOBIN 10.1* 9.7*   HEMATOCRIT 32.9* 31.6*   MCV 91.9 93.2   MCH 28.2 28.6   MCHC 30.7* 30.7*   RDW 57.4* 56.5*   PLATELETCT 438 425   MPV 9.1 8.9*     Recent Labs     05/28/21  0410 05/29/21  0546   SODIUM 138 139   POTASSIUM 4.0 3.8   CHLORIDE 100 102   CO2 27 30   GLUCOSE 90 91   BUN 5* 4*   CREATININE 0.22* 0.19*   CALCIUM 9.3 9.3                    Imaging  DX-CHEST-PORTABLE (1 VIEW)   Final Result         1. Stable lines and tubes.   2. Stable ill-defined bilateral pulmonary opacities, right worse than left. No large pleural effusions.         DX-CHEST-PORTABLE (1 VIEW)   Final Result      1.  Decrease in volume of right pleural fluid collection/empyema with 2 right chest tubes in place. No pneumothorax identified.      2.  No focal consolidation in the left lung.      DX-CHEST-PORTABLE (1 VIEW)   Final Result      No significant interval change.      DX-CHEST-PORTABLE (1 VIEW)   Final Result         1.  Hazy right pulmonary infiltrates and/or effusion, stable compared to prior study.      DX-CHEST-PORTABLE (1 VIEW)   Final Result         1.  Hazy right pulmonary infiltrates and/or effusion, stable compared to prior study.      DX-CHEST-PORTABLE (1 VIEW)   Final Result      1.  All lines and tubes appear appropriately located      2.  Consolidation throughout the right lung consistent with pneumonia      DX-CHEST-PORTABLE (1 VIEW)   Final Result         1.  Hazy right pulmonary infiltrates and/or effusion, increased compared to prior study.      DX-ABDOMEN FOR TUBE PLACEMENT   Final Result      Feeding tube looped in the stomach.      DX-CHEST-PORTABLE (1 VIEW)   Final Result         1. Stable lines and tubes. No pneumothorax detected.   2. Persistent opacities in the right perihilar region and in the right lung periphery, similar to prior study. Left lung is essentially clear.      DX-CHEST-PORTABLE (1 VIEW)   Final Result      Postoperative changes of the right chest with placement of an additional chest tube. There is improved aeration in the right lung. No significant pleural effusion or definite pneumothorax.      Right IJ central venous catheter tip projects over the proximal right atrium.      Hypoinflation with interstitial and hazy pulmonary opacities.      DX-CHEST-PORTABLE (1 VIEW)   Final Result         1.  Hazy right pulmonary  infiltrates and/or effusion, similar compared to prior study.      DX-ABDOMEN FOR TUBE PLACEMENT   Final Result      Feeding tube tip projects over the expected location the gastric antrum.      DX-CHEST-PORTABLE (1 VIEW)   Final Result         1.  Hazy right pulmonary infiltrates and/or effusion, similar compared to prior study.      DX-CHEST-LIMITED (1 VIEW)   Final Result      1.  No pneumothorax. Only one chest tube is now seen in the right lung.   2.  The chest is otherwise stable.      DX-CHEST-PORTABLE (1 VIEW)   Final Result         1.  Hazy right pulmonary infiltrates and/or effusion, similar compared to prior study.      DX-CHEST-PORTABLE (1 VIEW)   Final Result         1.  Hazy right pulmonary infiltrates and/or effusion, similar compared to prior study.      DX-ABDOMEN FOR TUBE PLACEMENT   Final Result         1.  Nonspecific bowel gas pattern.   2.  Dobbhoff tube tip terminates overlying the expected location of the gastric antrum.   3.  Hazy bilateral lung base infiltrates, greater on the right.      DX-CHEST-PORTABLE (1 VIEW)   Final Result         1.  Hazy right pulmonary infiltrates and/or effusion, similar compared to prior study.      US-RUQ   Final Result         1.  Hepatomegaly   2.  Mild intrahepatic and extrahepatic biliary ductal dilatation, commonly associated with postcholecystectomy physiology, consider causes of biliary obstruction with additional workup as clinically appropriate      CT-CHEST,ABDOMEN,PELVIS WITH   Final Result      1.  Large empyema in the RIGHT hemithorax, slightly decreased in size from prior exam with chest tubes present.   2.  Consolidation remains.   3.  Multiple cavitary lesions again seen in the LEFT upper lobe, minimally decreased from prior, concerning for septic emboli.   4.  Supportive tubing is unchanged.   5.  Intrahepatic and extrahepatic biliary dilation, likely postoperative although distal stricture, stone or mass remain possibilities.   6.  Moderate  pelvic fluid, nonspecific.   7.  No evidence of bowel obstruction or perforation.      DX-CHEST-PORTABLE (1 VIEW)   Final Result         1.  Hazy right pulmonary infiltrates and/or effusion, similar compared to prior study.      DX-CHEST-PORTABLE (1 VIEW)   Final Result         No significant interval change.            DX-CHEST-PORTABLE (1 VIEW)   Final Result         Interval removal of left central venous catheter. Interval adjustment of right PICC with tip in the SVC.         DX-ABDOMEN FOR TUBE PLACEMENT   Final Result      Feeding tube tip at the mid to distal stomach.      IR-PICC LINE PLACEMENT W/ GUIDANCE > AGE 5   Final Result                  Ultrasound-guided PICC placement performed by qualified nursing staff as    above.          DX-CHEST-PORTABLE (1 VIEW)   Final Result      1.  Stable cardiopulmonary findings.   2.  See comments above regarding the right-sided PICC position.      DX-CHEST-PORTABLE (1 VIEW)   Final Result      Stable chest findings compared to 5/5.      DX-CHEST-PORTABLE (1 VIEW)   Final Result      Stable chest findings compared with 5/3.      DX-ABDOMEN FOR TUBE PLACEMENT   Final Result         Feeding tube with tip projecting over the expected area of the stomach.      DX-CHEST-PORTABLE (1 VIEW)   Final Result      Stable chest findings compared with prior.      DX-CHEST-PORTABLE (1 VIEW)   Final Result         1. No significant interval change.      US-EXTREMITY ARTERY LOWER UNILAT RIGHT   Final Result      DX-CHEST-PORTABLE (1 VIEW)   Final Result         1. No significant interval change.      IR-PICC LINE PLACEMENT W/ GUIDANCE > AGE 5   Final Result                  Ultrasound-guided PICC placement performed by qualified nursing staff as    above.          DX-CHEST-PORTABLE (1 VIEW)   Final Result         1.  Pulmonary edema and/or infiltrates, similar to prior study.   2.  Stable opacification right lung, likely effusion. Thoracostomy tubes are in place.   3.  Multiple  cavitary appearing left pulmonary lesions, see CT performed April 27, 2021 for further characterization      DX-CHEST-PORTABLE (1 VIEW)   Final Result         1.  Pulmonary edema and/or infiltrates, similar to prior study.   2.  Single opacification right lung, likely effusion. Thoracostomy tubes are in place.   3.  Multiple cavitary appearing left pulmonary lesions, see CT performed April 27, 2021 for further characterization   4.  Soft tissue gas in the right chest wall      DX-CHEST-PORTABLE (1 VIEW)   Final Result         1.  Pulmonary edema and/or infiltrates, similar to prior study.   2.  Increased opacification right lung, likely increased effusion. Thoracostomy tubes are in place.   3.  Multiple cavitary appearing left pulmonary lesions, see CT performed April 27, 2021 for further characterization   4.  Soft tissue gas in the right chest wall      DX-CHEST-PORTABLE (1 VIEW)   Final Result         1.  Pulmonary edema and/or infiltrates, similar to prior study.   2.  Right pneumothorax, stable compared to prior study, right thoracostomy tubes remain in place   3.  Multiple cavitary appearing left pulmonary lesions, see CT performed April 27, 2021 for further characterization   4.  Soft tissue gas in the right chest wall      DX-CHEST-PORTABLE (1 VIEW)   Final Result         1.  Pulmonary edema and/or infiltrates, similar to prior study.   2.  Right pneumothorax, interval decreased compared to prior study, interval placement of right thoracostomy tubes is noted.   3.  Multiple cavitary appearing left pulmonary lesions, see CT performed yesterday for further characterization   4.  Soft tissue gas in the right chest wall      DX-CHEST-PORTABLE (1 VIEW)   Final Result         1.  Pulmonary edema and/or infiltrates, similar to prior study.   2.  Right pneumothorax, stable compared to prior study, interval removal of right thoracostomy tube is noted.   3.  Multiple cavitary appearing left pulmonary lesions, see CT  performed yesterday for further characterization   4.  Soft tissue gas in the right chest wall      DX-CHEST-LIMITED (1 VIEW)   Final Result         No significant interval change.      CT-CTA CHEST PULMONARY ARTERY W/ RECONS   Final Result         No CT evidence of pulmonary embolus.      Previous right chest tube were removed.      Grossly similar in size of the loculated right hydropneumothorax but there is increased layering fluid component. Unchanged mild shift of the mediastinal to the left.      Redemonstration of a pigtail catheter in the medial left lung base. Grossly similar multiple cavitary lesions in the left lung.      US-EXTREMITY VENOUS LOWER BILAT   Final Result      POCT BUTTERFLY-DUPLEX SCAN EXTREMITY VEINS LIMITED   Final Result      DX-CHEST-PORTABLE (1 VIEW)   Final Result         1.  Pulmonary edema and/or infiltrates, similar to prior study.   2.  Right pneumothorax, somewhat decreased to prior study, interval removal of right thoracostomy tube is noted.   3.  Multiple cavitary appearing left pulmonary lesions, see CT performed yesterday for further characterization   4.  Soft tissue gas in the right chest wall      DX-CHEST-PORTABLE (1 VIEW)   Final Result         1.  Pulmonary edema and/or infiltrates, similar to prior study.   2.  Right pneumothorax, similar to prior study with thoracostomy tube in place.   3.  Multiple cavitary appearing left pulmonary lesions, see CT performed yesterday for further characterization      CT-CHEST (THORAX) W/O   Final Result      Large loculated right hydropneumothorax with interval increase in size of the pneumothorax and pleural fluid.      Right chest tube is in the posterior right thorax.      There is mediastinal shift to the left compatible with tension.      Small pericardial effusion.      Small loculated left pleural effusion with overlying atelectasis/consolidation.   Pigtail catheter is seen at the medial left lung base. Pleural effusion has  decreased in size compared to prior.      There are multiple foci of air extending from the right lung to the pleural space suspicious for a bronchopleural fistula.      Multiple cavitary lesions bilaterally with mild interval decrease of the solid component of the cavitary nodules.      Noncavitary 3.1 cm masslike density is seen at the left lung base.      Findings may be related to septic emboli, malignancy or multifocal abscesses. Short interval follow-up recommended.      Soft tissue air on the right is again seen.      Splenomegaly      Findings discussed with Leti Sun.      DX-CHEST-PORTABLE (1 VIEW)   Final Result      No significant change from prior exam.      CT-HEAD W/O   Final Result      1.  RIGHT frontal ventriculostomy catheter unchanged in position.   2.  Mild cortical atrophy.   3.  No intracranial hemorrhage.   4.  Apparent dural thickening overlying the clivus.  Consider further evaluation with contrast-enhanced MRI.      DX-CHEST-LIMITED (1 VIEW)   Final Result      Interval left basilar pigtail catheter with diminished atelectasis, pleural fluid      Stable right hydropneumothorax with thoracostomy tube in place, considerable soft tissue gas and partial lung collapse      IR-CHEST TUBE-EMPYEMA LEFT   Final Result      1.  CT GUIDED LEFT  10 [place] PIGTAIL CHEST TUBE PLACEMENT FOR POSSIBLE EMPYEMA YIELDING OLD BLOODY FLUID.      2. A CHEST RADIOGRAPH IS FORTHCOMING.      EC-MICAH W/O CONT   Final Result      US-ABDOMEN LTD (SOFT TISSUE)   Final Result      No fluid seen surrounding the electronic implanted spinal stimulator device.      DX-CHEST-LIMITED (1 VIEW)   Final Result      1.  Large right hydropneumothorax with right-sided chest tube in place, likely stable to slightly decreased from prior study.   2.  Small left pleural effusion. Mild improved aeration in the left lung.   3.  Bilateral pulmonary opacities which could be related to combination of atelectasis, edema and/or infection..       CT-CTA HEAD WITH & W/O-POST PROCESS   Final Result      1.  Patent carotid and vertebral arteries.      2.  Again seen right frontal the jugular peritoneal shunt catheter. There is mild increase in volume of the lateral and third ventricles.      CT-CHEST (THORAX) W/O   Final Result      1.  Interval placement of a right-sided chest tube into a large loculated right-sided pleural effusion. There is now seen a large right-sided hydropneumothorax in the area that previously seen fluid. The chest tube extends into that hydropneumothorax.    There is some residual pleural fluid seen as well. A hydropneumothorax is somewhat loculated with components most prominently inferiorly and medially.      2.  New loculated left pleural effusion.      3.  Again seen multiple bilateral cavitary pulmonary masses which are more prominent than previously seen with increasing cavitation and measure up to 3 cm size. Differential diagnosis includes septic emboli, multifocal abscesses and neoplasm.      4.  Large amount of subcutaneous emphysema on the right.      5.  Splenomegaly.      6.  Multiple support devices.      Fleischner Society pulmonary nodule recommendations:         DX-CHEST-LIMITED (1 VIEW)   Final Result      1.  Support devices as described above.      2.  Right chest tube present with a again seen right-sided pneumothorax, possibly increased in size and loculated.      3.  Worsening bilateral pulmonary infiltrates.      DX-ABDOMEN FOR TUBE PLACEMENT   Final Result      Cortrak feeding tube tip projects in the region of the duodenal bulb.      DX-CHEST-LIMITED (1 VIEW)   Final Result      Loculated right pneumothorax is decreased in size compared to prior.      Small left pleural effusion.      Small loculated right pleural effusion.      Extensive bilateral airspace opacities are not significantly changed.      Soft tissue air on the right is again noted.      DX-CHEST-LIMITED (1 VIEW)   Final Result      Decreased  partially loculated right pleural fluid with increased pleural air. Right chest tube is in place.      Increased hazy opacity in the left lung possibly atelectasis.         US-ABDOMEN LTD (SOFT TISSUE)   Final Result      No drainable fluid collection.      DX-CHEST-PORTABLE (1 VIEW)   Final Result      Decreased partially loculated right pleural fluid with increased pleural air. Right chest tube is in place.      No other significant change.      DX-CHEST-PORTABLE (1 VIEW)   Final Result         1.  Pulmonary edema and/or infiltrates are identified, which are somewhat decreased since the prior exam.   2.  Moderate loculated appearing right pleural effusion, slightly decreased since prior      DX-ABDOMEN FOR TUBE PLACEMENT   Final Result      Feeding tube tip at the distal stomach.      DX-CHEST-PORTABLE (1 VIEW)   Final Result         1.  New chest tube is noted in the right lower hemithorax.      2.  Right pleural effusion is again identified which appears similar to the prior exam.      3.  No new infiltrates or consolidations are identified.      DX-CHEST-PORTABLE (1 VIEW)   Final Result         1.  Pulmonary edema and/or infiltrates are identified, which are stable since the prior exam.   2.  Moderate loculated appearing right pleural effusion      DX-CHEST-PORTABLE (1 VIEW)   Final Result         No significant interval change.      POCT BUTTERFLY-ECHOCARDIOGRAM LTD W/O CONT    (Results Pending)   DX-CHEST-PORTABLE (1 VIEW)    (Results Pending)        Assessment/Plan  * Empyema of right pleural space (HCC)- (present on admission)  Assessment & Plan  Patient s/p multiple chest tubes, thoracotomy and decortication  Now s/p second decortication 5/17      Debility  Assessment & Plan  Acute on chronic, the patient likely need of LTAC treatment post acute treatment    Elevated alkaline phosphatase level- (present on admission)  Assessment & Plan  The patient not able to tolerate a MRCP at this time, ultrasound  grossly unremarkable, observe    Agitation requiring sedation protocol- (present on admission)  Assessment & Plan  Monitor,    Goals of care, counseling/discussion  Assessment & Plan  Patient with overall high risk of morbidity and mortality given her gravely ill state and prior chronic medical conditions  Palliative care following    Spinal cord stimulator status  Assessment & Plan  Patient's stimulator is [employer]. It is FDA approved as MRI compatible, but the company has gone out of business, so there is no support team to assist with MRI.  Thus, if MRI were performed, our radiologists would need to protocol it correctly to manage the stimulator. The former rep from [employer] is Andre, [phone].  Information obtained from Dr. Mahoney's office, [phone].     Per  (5/4/2021), it is not MRI compatible unfortunately.     Thrombocytosis (HCC)  Assessment & Plan  Acute reaction since resolved    Anasarca- (present on admission)  Assessment & Plan  Improved nutritional status, skin protective measures    Trapped lung  Assessment & Plan  Now s/p decortication   Chest tubes removed on 5/26, observe    Pulmonary abscess (HCC)  Assessment & Plan  From septic emboli  Now s/p decortication    Fever  Assessment & Plan  Resolved, monitor, antibiotic therapy    Atelectasis  Assessment & Plan  Pulmonary recruiting, respiratory care    Pneumonia  Assessment & Plan  MSSA  Ancef per ID    History of vasculitis- (present on admission)  Assessment & Plan  Does not appear to be a current issue    Bacteremia due to methicillin susceptible Staphylococcus aureus (MSSA)- (present on admission)  Assessment & Plan  Infectious disease assisting with antibiotic treatment  Multiple potential sources  Endocarditis  ID following    CSF pleocytosis- (present on admission)  Assessment & Plan  Infectious disease managing    Acute bacterial endocarditis- (present on admission)  Assessment & Plan  Mitral valve, tricuspid  valve  MSSA  Ongoing antibiotic therapy for acute infection and 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    Acute blood loss anemia  Assessment & Plan  Likely secondary to hemothorax  300 cc of blood removed after chest tube placed at [facility]  Trend hemoglobin    Prolonged Q-T interval on ECG- (present on admission)  Assessment & Plan  Resolved after correcting metabolic issues  Cont to follow   Monitor electrolytes, acidosis etc    Acute respiratory failure with hypoxia (HCC)- (present on admission)  Assessment & Plan  Secondary to MSSA bacteremia, empyema, pulmonary septic emboli  S/p tracheostomy  Ongoing respiratory care, pulmonary toilet, antibiotic treatment  Prolonged ventilator dependence  Will need LTAC  Will repeat cxr today she is requiring 10 L of o2.    History of complicated migraines- (present on admission)  Assessment & Plan  Monitor    GERD (gastroesophageal reflux disease)- (present on admission)  Assessment & Plan  History of, as needed treatment    Hyponatremia  Assessment & Plan  Resolved  Follow daily    Tachycardia  Assessment & Plan  Persistent, likely secondary with multiple underlying ongoing conditions  Monitor  Stable    Pseudotumor cerebri  Assessment & Plan  History of  shunt, neurosurgery opted against removal    H/O: CVA (cerebrovascular accident)- (present on admission)  Assessment & Plan  Monitor    Chronic pain syndrome- (present on admission)  Assessment & Plan  Pre-existing, pain management    Port or reservoir infection  Assessment & Plan  S/p removal    Thrombocytopenia (HCC)- (present on admission)  Assessment & Plan  Transient, resolved    Anemia- (present on admission)  Assessment & Plan  Monitor, transfuse as indicated    Septic shock (HCC)- (present on admission)  Assessment & Plan  Multiple sources, recovered with aggressive treatment, IV antibiotics long-term  Resolved    Sepsis (HCC)  Assessment & Plan  Multiple sources possible, recovered with medical  treatment  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 Oh felt there is currently no need to remove    Acute encephalopathy- (present on admission)  Assessment & Plan  Per neurosurgery no need to remove  shunt  Improving with ongoing medical treatment  Improved           VTE prophylaxis: enoxaparin    Case and plan of care discussed with nurse staff, family and during multidisciplinary rounds.    Patient is has a high medical complexity and is at high risk for complication, morbidity, and mortality.

## 2021-05-30 NOTE — PROGRESS NOTES
2 RN Skin Check    2 RN skin check complete with CAROLINA Blakely.   Devices in place: trach with T-piece, wound vac on R side, PICC, purewick.  Skin assessed under devices: yes.  Confirmed pressure ulcers found on: Right big toe and left third toe.  New potential pressure ulcers noted on N/A.   The following interventions in place Pillows, Mepilex, Barrier cream and Waffle bed overlay, Q2 turns, barrier wipes.    Bilateral ears intact, pink, blanching. Tracheostomy in place, gauze changed. Bilateral elbows intact, pink, blanching. Wound vac to right lateral chest. Sacrum excoriation and rash, red, blanching, barrier cream applied. Groin pink, intact. DTI on right big toe purple, black, non-blanching. DTI on left third toe purple, black, non-blanching.

## 2021-05-30 NOTE — PROGRESS NOTES
2 RN skin check complete.     Trach changed this am by RT, clean gauze placed. Wound vac in use to R rib cage. Rash, pimples to back, applied. Healing excoriation to buttocks, barrier cream applied. DTI on right big toe purple, black, non-blanching. DTI on left third toe purple, black, non-blanching.     Devices in place Trach, PICC, wound vac.   Skin assessed under devices - YES.  Confirmed pressure ulcers found - has known DTI's R great toe and L third toe.  No new potential pressure ulcers noted.  The following interventions in place - Q 2 hr turns and repositioning. Waffle mattress in use, keeping skin clean and dry. Using barrier creams & wipes PRN. Mepilexes to heels.

## 2021-05-30 NOTE — PROGRESS NOTES
Received report from day shift RNConsuelo. Assumed care of pt @ 1900. Pt reports no pain at this time. Updated pt on plan of care. Pt resting comfortably in bed. Fall precautions in place. Educated on use of call light. Hourly rounding and continuous monitoring in place.

## 2021-05-30 NOTE — CARE PLAN
The patient is Stable - Low risk of patient condition declining or worsening    Shift Goals  Clinical Goals: mobility   Patient Goals: pain control    Progress made toward(s) clinical / shift goals:  pt receives po prn pain medication for pain control, pt ambulated in halls.    Problem: Pain Management  Goal: Pain level will decrease to patient's comfort goal  Outcome: Progressing    Assessing every four hrs for pain per protocol; see doc flowsheets, see MAR.     Problem: Knowledge Deficit - Standard  Goal: Patient and family/care givers will demonstrate understanding of plan of care, disease process/condition, diagnostic tests and medications  Outcome: Progressing    Discussed plan of care for today w/ pt this am, she is A+O X4, she verbalizes understanding of her plan of care, no questions. Emotional support given. Reinforcing education as necessary. Discussed pt's care with Hospitalist.     Problem: Mobility  Goal: Patient's capacity to carry out activities will improve  Outcome: Progressing    Ambulated around unit using FWW, staff SBA

## 2021-05-30 NOTE — CARE PLAN
The patient is Watcher - Medium risk of patient condition declining or worsening    Shift Goals  Clinical Goals: Pain management  Patient Goals: pain control    Progress made toward(s) clinical / shift goals:  Pt whiteboard updated on plan of care. Pt encouraged to call for assistance. Pt encouraged to voice any concerns or questions regarding care plan. Pt updated on plan of care as it develops and changes. Fall precautions in place. Bed in lowest position. Non-skid socks in place. Personal possessions within reach. Mobility sign on door. Bed-alarm on. Call light within reach. Pt educated regarding fall prevention and states understanding. Pt verbalizes understanding of 0-10 pain scale, when to call for pain medications, and medication information.       Problem: Pain Management  Goal: Pain level will decrease to patient's comfort goal  Outcome: Progressing     Problem: Knowledge Deficit - Standard  Goal: Patient and family/care givers will demonstrate understanding of plan of care, disease process/condition, diagnostic tests and medications  Outcome: Progressing     Problem: Fall Risk  Goal: Patient will remain free from falls  Outcome: Progressing

## 2021-05-31 LAB
ALBUMIN SERPL BCP-MCNC: 2.8 G/DL (ref 3.2–4.9)
ALBUMIN/GLOB SERPL: 0.7 G/DL
ALP SERPL-CCNC: 694 U/L (ref 30–99)
ALT SERPL-CCNC: 14 U/L (ref 2–50)
ANION GAP SERPL CALC-SCNC: 9 MMOL/L (ref 7–16)
AST SERPL-CCNC: 14 U/L (ref 12–45)
BASOPHILS # BLD AUTO: 0.5 % (ref 0–1.8)
BASOPHILS # BLD: 0.03 K/UL (ref 0–0.12)
BILIRUB SERPL-MCNC: 0.3 MG/DL (ref 0.1–1.5)
BUN SERPL-MCNC: 7 MG/DL (ref 8–22)
CALCIUM SERPL-MCNC: 9 MG/DL (ref 8.5–10.5)
CHLORIDE SERPL-SCNC: 95 MMOL/L (ref 96–112)
CO2 SERPL-SCNC: 29 MMOL/L (ref 20–33)
CREAT SERPL-MCNC: 0.51 MG/DL (ref 0.5–1.4)
EOSINOPHIL # BLD AUTO: 0.02 K/UL (ref 0–0.51)
EOSINOPHIL NFR BLD: 0.3 % (ref 0–6.9)
ERYTHROCYTE [DISTWIDTH] IN BLOOD BY AUTOMATED COUNT: 54.9 FL (ref 35.9–50)
GLOBULIN SER CALC-MCNC: 4.3 G/DL (ref 1.9–3.5)
GLUCOSE SERPL-MCNC: 154 MG/DL (ref 65–99)
HCT VFR BLD AUTO: 31 % (ref 37–47)
HGB BLD-MCNC: 9.5 G/DL (ref 12–16)
IMM GRANULOCYTES # BLD AUTO: 0.02 K/UL (ref 0–0.11)
IMM GRANULOCYTES NFR BLD AUTO: 0.3 % (ref 0–0.9)
LYMPHOCYTES # BLD AUTO: 1.14 K/UL (ref 1–4.8)
LYMPHOCYTES NFR BLD: 19 % (ref 22–41)
MCH RBC QN AUTO: 28.5 PG (ref 27–33)
MCHC RBC AUTO-ENTMCNC: 30.6 G/DL (ref 33.6–35)
MCV RBC AUTO: 93.1 FL (ref 81.4–97.8)
MONOCYTES # BLD AUTO: 0.37 K/UL (ref 0–0.85)
MONOCYTES NFR BLD AUTO: 6.2 % (ref 0–13.4)
NEUTROPHILS # BLD AUTO: 4.42 K/UL (ref 2–7.15)
NEUTROPHILS NFR BLD: 73.7 % (ref 44–72)
NRBC # BLD AUTO: 0 K/UL
NRBC BLD-RTO: 0 /100 WBC
PLATELET # BLD AUTO: 350 K/UL (ref 164–446)
PMV BLD AUTO: 9.6 FL (ref 9–12.9)
POTASSIUM SERPL-SCNC: 3.8 MMOL/L (ref 3.6–5.5)
PROCALCITONIN SERPL-MCNC: 0.12 NG/ML
PROT SERPL-MCNC: 7.1 G/DL (ref 6–8.2)
RBC # BLD AUTO: 3.33 M/UL (ref 4.2–5.4)
SODIUM SERPL-SCNC: 133 MMOL/L (ref 135–145)
WBC # BLD AUTO: 6 K/UL (ref 4.8–10.8)

## 2021-05-31 PROCEDURE — 700111 HCHG RX REV CODE 636 W/ 250 OVERRIDE (IP): Performed by: INTERNAL MEDICINE

## 2021-05-31 PROCEDURE — 99232 SBSQ HOSP IP/OBS MODERATE 35: CPT | Performed by: HOSPITALIST

## 2021-05-31 PROCEDURE — 94640 AIRWAY INHALATION TREATMENT: CPT

## 2021-05-31 PROCEDURE — 80053 COMPREHEN METABOLIC PANEL: CPT

## 2021-05-31 PROCEDURE — 94760 N-INVAS EAR/PLS OXIMETRY 1: CPT

## 2021-05-31 PROCEDURE — 92526 ORAL FUNCTION THERAPY: CPT

## 2021-05-31 PROCEDURE — 700102 HCHG RX REV CODE 250 W/ 637 OVERRIDE(OP): Performed by: HOSPITALIST

## 2021-05-31 PROCEDURE — A9270 NON-COVERED ITEM OR SERVICE: HCPCS | Performed by: HOSPITALIST

## 2021-05-31 PROCEDURE — 84145 PROCALCITONIN (PCT): CPT

## 2021-05-31 PROCEDURE — 770020 HCHG ROOM/CARE - TELE (206)

## 2021-05-31 PROCEDURE — A9270 NON-COVERED ITEM OR SERVICE: HCPCS | Performed by: INTERNAL MEDICINE

## 2021-05-31 PROCEDURE — 94669 MECHANICAL CHEST WALL OSCILL: CPT

## 2021-05-31 PROCEDURE — 700102 HCHG RX REV CODE 250 W/ 637 OVERRIDE(OP): Performed by: INTERNAL MEDICINE

## 2021-05-31 PROCEDURE — 85025 COMPLETE CBC W/AUTO DIFF WBC: CPT

## 2021-05-31 RX ORDER — METHADONE HYDROCHLORIDE 5 MG/1
5 TABLET ORAL ONCE
Status: DISPENSED | OUTPATIENT
Start: 2021-05-31 | End: 2021-06-01

## 2021-05-31 RX ORDER — DIPHENHYDRAMINE HCL 25 MG
25 TABLET ORAL EVERY 6 HOURS PRN
Status: DISCONTINUED | OUTPATIENT
Start: 2021-05-31 | End: 2021-06-01

## 2021-05-31 RX ADMIN — Medication: at 00:09

## 2021-05-31 RX ADMIN — DIPHENHYDRAMINE HYDROCHLORIDE 25 MG: 25 TABLET ORAL at 13:16

## 2021-05-31 RX ADMIN — DULOXETINE HYDROCHLORIDE 60 MG: 60 CAPSULE, DELAYED RELEASE ORAL at 17:54

## 2021-05-31 RX ADMIN — RISPERIDONE 2 MG: 2 TABLET ORAL at 17:54

## 2021-05-31 RX ADMIN — ONDANSETRON 4 MG: 2 INJECTION INTRAMUSCULAR; INTRAVENOUS at 19:10

## 2021-05-31 RX ADMIN — DIVALPROEX SODIUM 500 MG: 125 CAPSULE ORAL at 22:56

## 2021-05-31 RX ADMIN — OXYCODONE 5 MG: 5 TABLET ORAL at 09:16

## 2021-05-31 RX ADMIN — DULOXETINE HYDROCHLORIDE 60 MG: 60 CAPSULE, DELAYED RELEASE ORAL at 05:17

## 2021-05-31 RX ADMIN — DIVALPROEX SODIUM 500 MG: 125 CAPSULE ORAL at 05:16

## 2021-05-31 RX ADMIN — KETOROLAC TROMETHAMINE 30 MG: 30 INJECTION, SOLUTION INTRAMUSCULAR; INTRAVENOUS at 00:09

## 2021-05-31 RX ADMIN — METHADONE HYDROCHLORIDE 30 MG: 10 TABLET ORAL at 05:49

## 2021-05-31 RX ADMIN — CEFAZOLIN SODIUM 2 G: 2 INJECTION, SOLUTION INTRAVENOUS at 23:00

## 2021-05-31 RX ADMIN — ENOXAPARIN SODIUM 40 MG: 40 INJECTION SUBCUTANEOUS at 05:17

## 2021-05-31 RX ADMIN — DIPHENHYDRAMINE HYDROCHLORIDE 25 MG: 25 TABLET ORAL at 23:00

## 2021-05-31 RX ADMIN — PAROXETINE HYDROCHLORIDE 10 MG: 10 TABLET, FILM COATED ORAL at 05:17

## 2021-05-31 RX ADMIN — RISPERIDONE 2 MG: 2 TABLET ORAL at 05:17

## 2021-05-31 RX ADMIN — DIVALPROEX SODIUM 500 MG: 125 CAPSULE ORAL at 13:10

## 2021-05-31 RX ADMIN — ACETAMINOPHEN 500 MG: 325 TABLET ORAL at 17:54

## 2021-05-31 RX ADMIN — ACETAMINOPHEN 500 MG: 325 TABLET ORAL at 09:16

## 2021-05-31 RX ADMIN — CEFAZOLIN SODIUM 2 G: 2 INJECTION, SOLUTION INTRAVENOUS at 13:19

## 2021-05-31 RX ADMIN — ACETAMINOPHEN 500 MG: 325 TABLET ORAL at 22:54

## 2021-05-31 RX ADMIN — OXYCODONE 5 MG: 5 TABLET ORAL at 19:10

## 2021-05-31 RX ADMIN — GABAPENTIN 300 MG: 300 CAPSULE ORAL at 05:16

## 2021-05-31 RX ADMIN — GABAPENTIN 300 MG: 300 CAPSULE ORAL at 13:15

## 2021-05-31 RX ADMIN — CEFAZOLIN SODIUM 2 G: 2 INJECTION, SOLUTION INTRAVENOUS at 05:16

## 2021-05-31 RX ADMIN — GABAPENTIN 300 MG: 300 CAPSULE ORAL at 22:56

## 2021-05-31 RX ADMIN — OXYCODONE 5 MG: 5 TABLET ORAL at 03:24

## 2021-05-31 ASSESSMENT — PAIN DESCRIPTION - PAIN TYPE
TYPE: ACUTE PAIN

## 2021-05-31 ASSESSMENT — ENCOUNTER SYMPTOMS
NAUSEA: 1
ABDOMINAL PAIN: 0
SHORTNESS OF BREATH: 0
WHEEZING: 0
HEADACHES: 1
MYALGIAS: 0
CHILLS: 0
DIARRHEA: 0
COUGH: 1
FEVER: 0
SPUTUM PRODUCTION: 0

## 2021-05-31 ASSESSMENT — FIBROSIS 4 INDEX: FIB4 SCORE: 0.42

## 2021-05-31 NOTE — PROGRESS NOTES
Waiting on a bed at hospitals. No significant changes. , CRP 3.85, chest xray shows improvement in bilateral infiltrates.   John Rees

## 2021-05-31 NOTE — THERAPY
"Speech Language Pathology  Daily Treatment     Patient Name: Enedelia Pang  Age:  50 y.o., Sex:  female  Medical Record #: [id number]  Today's Date: 5/31/2021     Precautions  Precautions: (P) Fall Risk, Tracheostomy , Swallow Precautions ( See Comments)  Comments: wound vac     Assessment    Pt was seen for dysphagia tx with PO of soft/bite sized solids, minced/moist solids and mildly thick liquids. Speaking valve was placed with RN prior to PO intake. Pt is currently on 10L/45% FiO2 via T-piece. She was awake but slow to respond. Pt was able to self-feed with extra time though did appear to have difficulty due to generalized weakness, benefiting from SLP assistance for energy conservation. Prolonged inefficient mastication appreciated with both minced/moist and soft/bite sized solids. Overt weak coughing occurred in~75% of opps with soft/bite sized solids, concerning for airway invasion. Pt reported she felt the minced/moist solids were \"sticking\" in her throat. Mod verbal cues provided for pt to reduce bolus size, dry swallow every 2-3 bites and alternate solids and liquids. Recommend diet downgrade to Pureed Solids (PU4) and Mildly Thick Liquids (MT2) with 1:1 feeding A to improve energy conservation and reduce risk of aspiration. Pt verbalized agreement. Speaking valve was removed at the end of the meal as pt was wanting to sleep. Tracheal suctioning performed, yielding min thin clear secretions.    Plan    Continue current treatment plan.    Discharge Recommendations: (P) Recommend post-acute placement for additional speech therapy services prior to discharge home         Objective       05/31/21 1030   Speech / Dysarthria   Intensity / Loudness Training Minimal (4)   Dysphagia    Positioning / Behavior Modification Modulate Rate or Bite Size;Self Monitoring   Other Treatments meal tx SB6, MT2, tx trials of MM5   Diet / Liquid Recommendation Puree (4);Mildly Thick (2) - (Nectar Thick)   Recommended Route of " "Medication Administration   Medication Administration  Float Whole with Puree   Patient / Family Goals   Patient / Family Goal #1 \"I can breathe better\"   Goal #1 Outcome Progressing as expected   Short Term Goals   Short Term Goal # 1 The patient will tolerate the speaking valve while awake with no s/sx of emotional or respiratory distress   Goal Outcome # 1 Progressing as expected   Short Term Goal # 2 B  Pt will consume SB6/MT2 diet with no overt s/sx of aspiration, given min cues to swallowing strategies.   (Discontinue 5/31)   Short Term Goal # 3 New 5/31: Pt will consume pureed solids/mildly thick liquids with 1:1 supervision/feeding and no s/sx of aspiration.         "

## 2021-05-31 NOTE — CARE PLAN
Problem: Nutritional:  Goal: Achieve adequate nutritional intake  Description: Patient will consume 50% of meals  Outcome: Not Progressing   PO 5/28 %. PO 5/29-30: <25-50%. Added supplements per poor PO protocol; will follow up in 2-3 days to assess for improvement in PO intake.

## 2021-05-31 NOTE — CARE PLAN
The patient is Stable - Low risk of patient condition declining or worsening    Shift Goals  Clinical Goals: mobility   Patient Goals: pain control    Progress made toward(s) clinical / shift goals:  Progressing      Problem: Pain Management  Goal: Pain level will decrease to patient's comfort goal  Outcome: Progressing  Note: Pt reports pain well controlled on current regimen.     Problem: Knowledge Deficit - Standard  Goal: Patient and family/care givers will demonstrate understanding of plan of care, disease process/condition, diagnostic tests and medications  Outcome: Progressing  Note: Pt reports understanding of plan of care, has no questions at this time.

## 2021-05-31 NOTE — PROGRESS NOTES
Hospital Medicine Daily Progress Note    Date of Service  5/31/2021    Chief Complaint  50 y.o. female admitted 4/16/2021 with recurrent infections    Hospital Course   This is a 50-year-old female with a past medical history of pseudotumor cerebri s/p  shunt implantation by Dr. 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  [facility] infectious disease is managing antibiotics for the patient.  Dr. Candelaria surgery removed the port on 4/12.  The patient was further identified to have endocarditis of the tricuspid valve, a lumbar puncture performed on 4/14 showed pleocytosis with negative Gram stain.  The patient suffered worsening leukocytosis and was found to have septic emboli per CT.  Initially a small pleural catheter was placed.  Significant loculations were encountered.  MSSA empyema was diagnosed.  Neurosurgery was consulted to comment on possibly removal of a  shunt, this was felt not appropriate at this time.  The patient remained on mechanical ventilation for 15 days, then a perc trach was placed.  The patient was eventually liberated from mechanical ventilation, the patient did have a right thoracoscopy and decortication of the lung performed on 4/28 by Dr. Ganser.  The patient was of identified to have multiple sources for MSSA including mitral valve, tricuspid valve vegetation, port, spinal stimulator,  shunt possibly.  Patient underwent repeat chest surgery, decortication with 2 chest tube placement on 5/18, had to be placed back on mechanical ventilation after surgery, central access was replaced on 5/19, the patient treated  in ICU with eventual chest tube removal with assistance of thoracic surgery and was deescalated in terms of respiratory support down to trach collar.  [place] in need of extensive rehabilitation, question of LTAC level    Interval Problem Update  Patient seen and examined today.    Patient tolerating treatment and therapies.  All Data, Medication data reviewed.  Case discussed with nursing as available.  Plan of Care reviewed with patient and notified of changes.  5/26 the patient on T-piece, 30% FiO2, no increase in work of breathing, small amount of secretions, good cough, chest x-ray improving, chest surgeon removed both chest tubes, ongoing antibiotic treatment with assistance of [facility] infectious disease.  Transfer options being evaluated.  Afebrile, tachycardic with heart rate in the 1 teens, blood pressure is soft but maintaining MAP.  Laboratory data reviewed, electrolytes are balanced, no leukocytosis, improving hemoglobin, chest x-ray with stable lines and tubes, stable ill-defined bilateral pulmonary opacities, no large effusions  5/27 the patient appears better, she is ambulating, the patient was cleared for diet, the patient is awake alert and following, afebrile, blood pressure between 90 and 130, no increase in respiratory difficulty, afebrile, ongoing antibiotic treatment through 6/14  Ongoing rehab efforts, awaiting LTAC approval    5/28 patient is in bed, core track removed, patient is tolerating diet, family is at bedside, discussed with , LTAC pending, patient tachycardic any symptoms no fever, blood pressure is soft but stable.  5/29 patient in bed, no fever or chills. Tolerating diet, vs stable. On T piece o2 requirement stable.   5/30 in bed looks comfortable, not in pain, o2 requirement increasing I will ordered cxr, discussed with RT today, continue iv abx per ID.   5/31 in bed feeling ok, o2 requirements decreased to 5L, no fever or chills. Discussed with nurse staff.      Consultants/Specialty  ID  Thoracic surgery  Pulmonary critical care    Code Status  Full Code    Disposition  Rehab, hopefully LTAC    Review of Systems  Review of Systems   Unable to perform ROS: Other   Poor communication secondary to tracheostomy    Physical Exam  Temp:  [36.1 °C (97 °F)-36.8 °C (98.2 °F)] 36.4 °C (97.6 °F)  Pulse:  [] 100  Resp:  [12-18] 14  BP: ()/(58-76) 99/70  SpO2:  [88 %-99 %] 95 %    Physical Exam  Vitals and nursing note reviewed.   Constitutional:       General: She is not in acute distress.     Appearance: She is well-developed. She is ill-appearing. She is not diaphoretic.   HENT:      Head: Normocephalic and atraumatic.   Eyes:      General: No scleral icterus.  Neck:      Vascular: No JVD.   Cardiovascular:      Rate and Rhythm: Regular rhythm. Tachycardia present.   Pulmonary:      Effort: Pulmonary effort is normal. No respiratory distress.      Breath sounds: No stridor. Rales present.      Comments: Trach without inflammation  Abdominal:      General: There is no distension.      Palpations: Abdomen is soft.      Tenderness: There is no abdominal tenderness.   Skin:     General: Skin is warm and dry.      Coloration: Skin is pale.      Findings: No rash.   Neurological:      Mental Status: She is alert and oriented to person, place, and time.      Motor: Weakness present.   Psychiatric:         Thought Content: Thought content normal.         Fluids    Intake/Output Summary (Last 24 hours) at 5/31/2021 1317  Last data filed at 5/30/2021 1900  Gross per 24 hour   Intake 120 ml   Output --   Net 120 ml       Laboratory  Recent Labs     05/29/21  0546 05/31/21  0652   WBC 5.8 6.0   RBC 3.39* 3.33*   HEMOGLOBIN 9.7* 9.5*   HEMATOCRIT 31.6* 31.0*   MCV 93.2 93.1   MCH 28.6 28.5   MCHC 30.7* 30.6*   RDW 56.5* 54.9*   PLATELETCT 425 350   MPV 8.9* 9.6     Recent Labs     05/29/21  0546 05/31/21  0652   SODIUM 139 133*   POTASSIUM 3.8 3.8   CHLORIDE 102 95*   CO2 30 29    GLUCOSE 91 154*   BUN 4* 7*   CREATININE 0.19* 0.51   CALCIUM 9.3 9.0                   Imaging  DX-CHEST-PORTABLE (1 VIEW)   Final Result      1.  Interval removal of the right-sided chest tubes. No pneumothorax is identified.   2.  Airspace opacities in the right are mildly improved and may represent atelectasis/consolidation.   3.  There is likely a small right pleural effusion.   4.  Patchy opacities on the left are mildly improved.   5.  Interval removal of the enteric tube.      DX-CHEST-PORTABLE (1 VIEW)   Final Result         1. Stable lines and tubes.   2. Stable ill-defined bilateral pulmonary opacities, right worse than left. No large pleural effusions.         DX-CHEST-PORTABLE (1 VIEW)   Final Result      1.  Decrease in volume of right pleural fluid collection/empyema with 2 right chest tubes in place. No pneumothorax identified.      2.  No focal consolidation in the left lung.      DX-CHEST-PORTABLE (1 VIEW)   Final Result      No significant interval change.      DX-CHEST-PORTABLE (1 VIEW)   Final Result         1.  Hazy right pulmonary infiltrates and/or effusion, stable compared to prior study.      DX-CHEST-PORTABLE (1 VIEW)   Final Result         1.  Hazy right pulmonary infiltrates and/or effusion, stable compared to prior study.      DX-CHEST-PORTABLE (1 VIEW)   Final Result      1.  All lines and tubes appear appropriately located      2.  Consolidation throughout the right lung consistent with pneumonia      DX-CHEST-PORTABLE (1 VIEW)   Final Result         1.  Hazy right pulmonary infiltrates and/or effusion, increased compared to prior study.      DX-ABDOMEN FOR TUBE PLACEMENT   Final Result      Feeding tube looped in the stomach.      DX-CHEST-PORTABLE (1 VIEW)   Final Result         1. Stable lines and tubes. No pneumothorax detected.   2. Persistent opacities in the right perihilar region and in the right lung periphery, similar to prior study. Left lung is essentially clear.       DX-CHEST-PORTABLE (1 VIEW)   Final Result      Postoperative changes of the right chest with placement of an additional chest tube. There is improved aeration in the right lung. No significant pleural effusion or definite pneumothorax.      Right IJ central venous catheter tip projects over the proximal right atrium.      Hypoinflation with interstitial and hazy pulmonary opacities.      DX-CHEST-PORTABLE (1 VIEW)   Final Result         1.  Hazy right pulmonary infiltrates and/or effusion, similar compared to prior study.      DX-ABDOMEN FOR TUBE PLACEMENT   Final Result      Feeding tube tip projects over the expected location the gastric antrum.      DX-CHEST-PORTABLE (1 VIEW)   Final Result         1.  Hazy right pulmonary infiltrates and/or effusion, similar compared to prior study.      DX-CHEST-LIMITED (1 VIEW)   Final Result      1.  No pneumothorax. Only one chest tube is now seen in the right lung.   2.  The chest is otherwise stable.      DX-CHEST-PORTABLE (1 VIEW)   Final Result         1.  Hazy right pulmonary infiltrates and/or effusion, similar compared to prior study.      DX-CHEST-PORTABLE (1 VIEW)   Final Result         1.  Hazy right pulmonary infiltrates and/or effusion, similar compared to prior study.      DX-ABDOMEN FOR TUBE PLACEMENT   Final Result         1.  Nonspecific bowel gas pattern.   2.  Dobbhoff tube tip terminates overlying the expected location of the gastric antrum.   3.  Hazy bilateral lung base infiltrates, greater on the right.      DX-CHEST-PORTABLE (1 VIEW)   Final Result         1.  Hazy right pulmonary infiltrates and/or effusion, similar compared to prior study.      US-RUQ   Final Result         1.  Hepatomegaly   2.  Mild intrahepatic and extrahepatic biliary ductal dilatation, commonly associated with postcholecystectomy physiology, consider causes of biliary obstruction with additional workup as clinically appropriate      CT-CHEST,ABDOMEN,PELVIS WITH   Final Result       1.  Large empyema in the RIGHT hemithorax, slightly decreased in size from prior exam with chest tubes present.   2.  Consolidation remains.   3.  Multiple cavitary lesions again seen in the LEFT upper lobe, minimally decreased from prior, concerning for septic emboli.   4.  Supportive tubing is unchanged.   5.  Intrahepatic and extrahepatic biliary dilation, likely postoperative although distal stricture, stone or mass remain possibilities.   6.  Moderate pelvic fluid, nonspecific.   7.  No evidence of bowel obstruction or perforation.      DX-CHEST-PORTABLE (1 VIEW)   Final Result         1.  Hazy right pulmonary infiltrates and/or effusion, similar compared to prior study.      DX-CHEST-PORTABLE (1 VIEW)   Final Result         No significant interval change.            DX-CHEST-PORTABLE (1 VIEW)   Final Result         Interval removal of left central venous catheter. Interval adjustment of right PICC with tip in the SVC.         DX-ABDOMEN FOR TUBE PLACEMENT   Final Result      Feeding tube tip at the mid to distal stomach.      IR-PICC LINE PLACEMENT W/ GUIDANCE > AGE 5   Final Result                  Ultrasound-guided PICC placement performed by qualified nursing staff as    above.          DX-CHEST-PORTABLE (1 VIEW)   Final Result      1.  Stable cardiopulmonary findings.   2.  See comments above regarding the right-sided PICC position.      DX-CHEST-PORTABLE (1 VIEW)   Final Result      Stable chest findings compared to 5/5.      DX-CHEST-PORTABLE (1 VIEW)   Final Result      Stable chest findings compared with 5/3.      DX-ABDOMEN FOR TUBE PLACEMENT   Final Result         Feeding tube with tip projecting over the expected area of the stomach.      DX-CHEST-PORTABLE (1 VIEW)   Final Result      Stable chest findings compared with prior.      DX-CHEST-PORTABLE (1 VIEW)   Final Result         1. No significant interval change.      US-EXTREMITY ARTERY LOWER UNILAT RIGHT   Final Result       DX-CHEST-PORTABLE (1 VIEW)   Final Result         1. No significant interval change.      IR-PICC LINE PLACEMENT W/ GUIDANCE > AGE 5   Final Result                  Ultrasound-guided PICC placement performed by qualified nursing staff as    above.          DX-CHEST-PORTABLE (1 VIEW)   Final Result         1.  Pulmonary edema and/or infiltrates, similar to prior study.   2.  Stable opacification right lung, likely effusion. Thoracostomy tubes are in place.   3.  Multiple cavitary appearing left pulmonary lesions, see CT performed April 27, 2021 for further characterization      DX-CHEST-PORTABLE (1 VIEW)   Final Result         1.  Pulmonary edema and/or infiltrates, similar to prior study.   2.  Single opacification right lung, likely effusion. Thoracostomy tubes are in place.   3.  Multiple cavitary appearing left pulmonary lesions, see CT performed April 27, 2021 for further characterization   4.  Soft tissue gas in the right chest wall      DX-CHEST-PORTABLE (1 VIEW)   Final Result         1.  Pulmonary edema and/or infiltrates, similar to prior study.   2.  Increased opacification right lung, likely increased effusion. Thoracostomy tubes are in place.   3.  Multiple cavitary appearing left pulmonary lesions, see CT performed April 27, 2021 for further characterization   4.  Soft tissue gas in the right chest wall      DX-CHEST-PORTABLE (1 VIEW)   Final Result         1.  Pulmonary edema and/or infiltrates, similar to prior study.   2.  Right pneumothorax, stable compared to prior study, right thoracostomy tubes remain in place   3.  Multiple cavitary appearing left pulmonary lesions, see CT performed April 27, 2021 for further characterization   4.  Soft tissue gas in the right chest wall      DX-CHEST-PORTABLE (1 VIEW)   Final Result         1.  Pulmonary edema and/or infiltrates, similar to prior study.   2.  Right pneumothorax, interval decreased compared to prior study, interval placement of right  thoracostomy tubes is noted.   3.  Multiple cavitary appearing left pulmonary lesions, see CT performed yesterday for further characterization   4.  Soft tissue gas in the right chest wall      DX-CHEST-PORTABLE (1 VIEW)   Final Result         1.  Pulmonary edema and/or infiltrates, similar to prior study.   2.  Right pneumothorax, stable compared to prior study, interval removal of right thoracostomy tube is noted.   3.  Multiple cavitary appearing left pulmonary lesions, see CT performed yesterday for further characterization   4.  Soft tissue gas in the right chest wall      DX-CHEST-LIMITED (1 VIEW)   Final Result         No significant interval change.      CT-CTA CHEST PULMONARY ARTERY W/ RECONS   Final Result         No CT evidence of pulmonary embolus.      Previous right chest tube were removed.      Grossly similar in size of the loculated right hydropneumothorax but there is increased layering fluid component. Unchanged mild shift of the mediastinal to the left.      Redemonstration of a pigtail catheter in the medial left lung base. Grossly similar multiple cavitary lesions in the left lung.      US-EXTREMITY VENOUS LOWER BILAT   Final Result      POCT BUTTERFLY-DUPLEX SCAN EXTREMITY VEINS LIMITED   Final Result      DX-CHEST-PORTABLE (1 VIEW)   Final Result         1.  Pulmonary edema and/or infiltrates, similar to prior study.   2.  Right pneumothorax, somewhat decreased to prior study, interval removal of right thoracostomy tube is noted.   3.  Multiple cavitary appearing left pulmonary lesions, see CT performed yesterday for further characterization   4.  Soft tissue gas in the right chest wall      DX-CHEST-PORTABLE (1 VIEW)   Final Result         1.  Pulmonary edema and/or infiltrates, similar to prior study.   2.  Right pneumothorax, similar to prior study with thoracostomy tube in place.   3.  Multiple cavitary appearing left pulmonary lesions, see CT performed yesterday for further  characterization      CT-CHEST (THORAX) W/O   Final Result      Large loculated right hydropneumothorax with interval increase in size of the pneumothorax and pleural fluid.      Right chest tube is in the posterior right thorax.      There is mediastinal shift to the left compatible with tension.      Small pericardial effusion.      Small loculated left pleural effusion with overlying atelectasis/consolidation.   Pigtail catheter is seen at the medial left lung base. Pleural effusion has decreased in size compared to prior.      There are multiple foci of air extending from the right lung to the pleural space suspicious for a bronchopleural fistula.      Multiple cavitary lesions bilaterally with mild interval decrease of the solid component of the cavitary nodules.      Noncavitary 3.1 cm masslike density is seen at the left lung base.      Findings may be related to septic emboli, malignancy or multifocal abscesses. Short interval follow-up recommended.      Soft tissue air on the right is again seen.      Splenomegaly      Findings discussed with Leti Sun.      DX-CHEST-PORTABLE (1 VIEW)   Final Result      No significant change from prior exam.      CT-HEAD W/O   Final Result      1.  RIGHT frontal ventriculostomy catheter unchanged in position.   2.  Mild cortical atrophy.   3.  No intracranial hemorrhage.   4.  Apparent dural thickening overlying the clivus.  Consider further evaluation with contrast-enhanced MRI.      DX-CHEST-LIMITED (1 VIEW)   Final Result      Interval left basilar pigtail catheter with diminished atelectasis, pleural fluid      Stable right hydropneumothorax with thoracostomy tube in place, considerable soft tissue gas and partial lung collapse      IR-CHEST TUBE-EMPYEMA LEFT   Final Result      1.  CT GUIDED LEFT  10 [place] PIGTAIL CHEST TUBE PLACEMENT FOR POSSIBLE EMPYEMA YIELDING OLD BLOODY FLUID.      2. A CHEST RADIOGRAPH IS FORTHCOMING.      EC-MICAH W/O CONT   Final Result       US-ABDOMEN LTD (SOFT TISSUE)   Final Result      No fluid seen surrounding the electronic implanted spinal stimulator device.      DX-CHEST-LIMITED (1 VIEW)   Final Result      1.  Large right hydropneumothorax with right-sided chest tube in place, likely stable to slightly decreased from prior study.   2.  Small left pleural effusion. Mild improved aeration in the left lung.   3.  Bilateral pulmonary opacities which could be related to combination of atelectasis, edema and/or infection..      CT-CTA HEAD WITH & W/O-POST PROCESS   Final Result      1.  Patent carotid and vertebral arteries.      2.  Again seen right frontal the jugular peritoneal shunt catheter. There is mild increase in volume of the lateral and third ventricles.      CT-CHEST (THORAX) W/O   Final Result      1.  Interval placement of a right-sided chest tube into a large loculated right-sided pleural effusion. There is now seen a large right-sided hydropneumothorax in the area that previously seen fluid. The chest tube extends into that hydropneumothorax.    There is some residual pleural fluid seen as well. A hydropneumothorax is somewhat loculated with components most prominently inferiorly and medially.      2.  New loculated left pleural effusion.      3.  Again seen multiple bilateral cavitary pulmonary masses which are more prominent than previously seen with increasing cavitation and measure up to 3 cm size. Differential diagnosis includes septic emboli, multifocal abscesses and neoplasm.      4.  Large amount of subcutaneous emphysema on the right.      5.  Splenomegaly.      6.  Multiple support devices.      Fleischner Society pulmonary nodule recommendations:         DX-CHEST-LIMITED (1 VIEW)   Final Result      1.  Support devices as described above.      2.  Right chest tube present with a again seen right-sided pneumothorax, possibly increased in size and loculated.      3.  Worsening bilateral pulmonary infiltrates.       DX-ABDOMEN FOR TUBE PLACEMENT   Final Result      Cortrak feeding tube tip projects in the region of the duodenal bulb.      DX-CHEST-LIMITED (1 VIEW)   Final Result      Loculated right pneumothorax is decreased in size compared to prior.      Small left pleural effusion.      Small loculated right pleural effusion.      Extensive bilateral airspace opacities are not significantly changed.      Soft tissue air on the right is again noted.      DX-CHEST-LIMITED (1 VIEW)   Final Result      Decreased partially loculated right pleural fluid with increased pleural air. Right chest tube is in place.      Increased hazy opacity in the left lung possibly atelectasis.         US-ABDOMEN LTD (SOFT TISSUE)   Final Result      No drainable fluid collection.      DX-CHEST-PORTABLE (1 VIEW)   Final Result      Decreased partially loculated right pleural fluid with increased pleural air. Right chest tube is in place.      No other significant change.      DX-CHEST-PORTABLE (1 VIEW)   Final Result         1.  Pulmonary edema and/or infiltrates are identified, which are somewhat decreased since the prior exam.   2.  Moderate loculated appearing right pleural effusion, slightly decreased since prior      DX-ABDOMEN FOR TUBE PLACEMENT   Final Result      Feeding tube tip at the distal stomach.      DX-CHEST-PORTABLE (1 VIEW)   Final Result         1.  New chest tube is noted in the right lower hemithorax.      2.  Right pleural effusion is again identified which appears similar to the prior exam.      3.  No new infiltrates or consolidations are identified.      DX-CHEST-PORTABLE (1 VIEW)   Final Result         1.  Pulmonary edema and/or infiltrates are identified, which are stable since the prior exam.   2.  Moderate loculated appearing right pleural effusion      DX-CHEST-PORTABLE (1 VIEW)   Final Result         No significant interval change.      POCT BUTTERFLY-ECHOCARDIOGRAM LTD W/O CONT    (Results Pending)         Assessment/Plan  * Empyema of right pleural space (HCC)- (present on admission)  Assessment & Plan  Patient s/p multiple chest tubes, thoracotomy and decortication  Now s/p second decortication 5/17      Debility  Assessment & Plan  Acute on chronic, the patient likely need of LTAC treatment post acute treatment    Elevated alkaline phosphatase level- (present on admission)  Assessment & Plan  The patient not able to tolerate a MRCP at this time, ultrasound grossly unremarkable, observe    Agitation requiring sedation protocol- (present on admission)  Assessment & Plan  Monitor,    Goals of care, counseling/discussion  Assessment & Plan  Patient with overall high risk of morbidity and mortality given her gravely ill state and prior chronic medical conditions  Palliative care following    Spinal cord stimulator status  Assessment & Plan  Patient's stimulator is Nuvectra. It is FDA approved as MRI compatible, but the company has gone out of business, so there is no support team to assist with MRI.  Thus, if MRI were performed, our radiologists would need to protocol it correctly to manage the stimulator. The former rep from [employer] is Andre, [phone].  Information obtained from Dr. Mahoney's office, [phone].     Per  (5/4/2021), it is not MRI compatible unfortunately.     Thrombocytosis (HCC)  Assessment & Plan  Acute reaction since resolved    Anasarca- (present on admission)  Assessment & Plan  Improved nutritional status, skin protective measures    Trapped lung  Assessment & Plan  Now s/p decortication   Chest tubes removed on 5/26, observe    Pulmonary abscess (HCC)  Assessment & Plan  From septic emboli  Now s/p decortication    Fever  Assessment & Plan  Resolved, monitor, antibiotic therapy    Atelectasis  Assessment & Plan  Pulmonary recruiting, respiratory care    Pneumonia  Assessment & Plan  MSSA  Ancef per ID    History of vasculitis- (present on admission)  Assessment & Plan  Does not  appear to be a current issue    Bacteremia due to methicillin susceptible Staphylococcus aureus (MSSA)- (present on admission)  Assessment & Plan  Infectious disease assisting with antibiotic treatment  Multiple potential sources  Endocarditis  ID following    CSF pleocytosis- (present on admission)  Assessment & Plan  Infectious disease managing    Acute bacterial endocarditis- (present on admission)  Assessment & Plan  Mitral valve, tricuspid valve  MSSA  Ongoing antibiotic therapy for acute infection and 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    Acute blood loss anemia  Assessment & Plan  Likely secondary to hemothorax  300 cc of blood removed after chest tube placed at [facility]  Trend hemoglobin    Prolonged Q-T interval on ECG- (present on admission)  Assessment & Plan  Resolved after correcting metabolic issues  Cont to follow   Monitor electrolytes, acidosis etc    Acute respiratory failure with hypoxia (HCC)- (present on admission)  Assessment & Plan  Secondary to MSSA bacteremia, empyema, pulmonary septic emboli  S/p tracheostomy  Ongoing respiratory care, pulmonary toilet, antibiotic treatment  Prolonged ventilator dependence  Will need LTAC  cxr showed improvement  Down to 5L of o2 today  CPT.    History of complicated migraines- (present on admission)  Assessment & Plan  Monitor    GERD (gastroesophageal reflux disease)- (present on admission)  Assessment & Plan  History of, as needed treatment    Hyponatremia  Assessment & Plan  Resolved  Follow daily    Tachycardia  Assessment & Plan  Persistent, likely secondary with multiple underlying ongoing conditions  Monitor  Stable    Pseudotumor cerebri  Assessment & Plan  History of  shunt, neurosurgery opted against removal    H/O: CVA (cerebrovascular accident)- (present on admission)  Assessment & Plan  Monitor    Chronic pain syndrome- (present on admission)  Assessment & Plan  Pre-existing, pain management    Port or reservoir  infection  Assessment & Plan  S/p removal    Thrombocytopenia (HCC)- (present on admission)  Assessment & Plan  Transient, resolved    Anemia- (present on admission)  Assessment & Plan  Monitor, transfuse as indicated    Septic shock (HCC)- (present on admission)  Assessment & Plan  Multiple sources, recovered with aggressive treatment, IV antibiotics long-term  Resolved    Sepsis (HCC)  Assessment & Plan  Multiple sources possible, recovered with medical treatment  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 Oh felt there is currently no need to remove    Acute encephalopathy- (present on admission)  Assessment & Plan  Per neurosurgery no need to remove  shunt  Improving with ongoing medical treatment  Improved           VTE prophylaxis: enoxaparin    Case and plan of care discussed with nurse staff, family and during multidisciplinary rounds.    Patient is has a high medical complexity and is at high risk for complication, morbidity, and mortality.

## 2021-05-31 NOTE — PROGRESS NOTES
Tele: SR - ST 88 to 125, HR now 109.   .14 / .08 / .48    Pt w/o c/o. Back in bed. Will pass care to NOC RN @ [facility]

## 2021-05-31 NOTE — PROGRESS NOTES
Infectious Disease Daily Progress Note    Date of Service  5/31/2021    Chief Complaint  Cefazolin 5/26-present.     Meropenem 5/19-5/26   Cefepime 4/23 5/18-  s/p 23 days  Cefazolin 5/18-5/19     Thoracoscopy & Decortication - 4/28  Decortication 5/17     PRIOR Rx:   Zosyn 4/22-4/23  Previous: Unasyn, Zosyn, Vanco, Daptomycin  Ceftaroline: start 4/13-4/15  Nafcillin 2g Q4 hrs 4/15-4/23 4/14: Unable to give name of previous NSG or neurologist. Seems confused, but able to say some sentences fluently.   4/15: Line cultures now growing MSSA. As well as from the blood. Repeat blood culture 4/13+ in both sets. Patient has worsening confusion. CSF fluid analyses suggest pleocytosis. Cultures are no growth from the CSF. Chest CT was ordered this morning indicating a loculated right pleural effusion as well as bilateral septic emboli. Dr. Mack spoke with Dr. Oh yesterday and no surgical intervention unless clinical deterioration.  4/16: Increased respiratory distress.  Tachypneic.  CT with loculated collection.  Dr Resendiz not available.  No thoracic surgeon available.  Plans to have pulmonary place CT.  Transferring to ICU.  4/17: Transferred to [facility] last night. Hgb dropped to 6.4 requiring PRBC transfusion. Requiring 2 pressors. Fio2 50%. Febrile 38.8. Pending OR decortication of empyema and hemothorax. Chest tube placed at [facility] shows 8,371 WBC, ,000, 74% N  4/18: Chest tube was upsized by surgery yesterday, no decortication. WBC 22k. Fio2 40%. Vasopressin. Levophed down to 2mcg. Afebrile 24 hrs. Last fever 38.6 on 4/16.   4/19: Still on vent. Levo 3 mcg, vasopressin. Afebrile 72 hours. WBC 21k. BC 4/17 NGTD x 48 hours. Unable to do MRI brain given neurostimulator per RN.   4/20: Remaining afebrile. Hb 6.2 and undergoing transfusion today.WBC 24,100, 5% bands.1700 ml CT output. Copious bloody ET secretions. No pressors. Receiving dornase and TPA per CT. Right pleural effusion not large per CXR  today.   4/21: WBC 31k. Bronchoscopy yesterday showing blood. Cultures sent. TPA on hold per RN due to arvind blood from chest tube requiring PRBC transfusion for hgb 6. Pending chest CT today. Per , spinal stimulator is non-MRI compatible. Levophed 10mcg. 30% Fio2. Afebrile.   4/22: Fever 101.3 this am. WBC 20,300 down from 32,200 yesterday. BAL growing Klebsiella pneumoniae but susceptibility panel not set up until today. CTA of brain shows no lesion. CT of chest shows enlarging cavitary lung lesions bilat and large loculated new left pleural effusion and large hydropneumothorax on right. TPA & dornase infusions of right chest ended 4/20 after considerable bleeding requiring transfusion. Hb now down again to 6.8.  4/23: WBC 23k. Fio2 40%. Tmax 38.1. US of stiumlator shows small fluid collection but no drainable abscess. Pending MICAH today. Pending L chest tube placement  4/24: Continue fever 100.8-101.8. WBC 16,600. MICAH shows large vegetations on both tricuspid valve and mitral valve with mild TR & MR.  No aortic root abscess. Left chest tube placed yesterday yielding 8 ml of old bloody fluid. Bronch today revealed copious thick maroon secretions in distal trachea and both mainstem bronchi. On the right these were obstructive. Remaining off pressors. Last positive blood cultures (MSSA) were 4/16, negative 4/17.  4/25: Fever 100.6 this AM. wBC 13,300. Hb 6.6. CT: right hydropneumothorax increasing, right bronchopleural fistula, mediastinal shift ot the left , multiple cavitary pulmonary nodules, 3.1 cm left basilar mass, splenomegaly. CT of head shows dural thickening over the clivus. Lac+ GNR >100,000 col/ml growing from BAL of 4/24, presumed Klebsiella. Left peural fluid culture negative from 4/23.  4/26: Fever 100.4 last night. WBC 13,500. Hb 7.4. Plts 502,000. ESR >120. UA fairly clear except 30 mg% protein, 2-5 wbc and rare rbc. CXR unchanged except more prominent pulmonary edema. GNR in BAL may be a  different species of Klebsiella, and resistant to ampicillin & tmp-sulfa; confirmation pending.  4/28: Fever 100.8 last night. WBC 27,700. Hb 6.6. Plts 482,000. Creat 0.19. Kleb pneum in BAL on 4/24 is resistant to ampicillin & tmp-sulfa but otherwise sensitive. O2 sat 96% on FIO2 30% now, PEEP 8. Has been re-evaluated by thoracic surgeon, Dr. Ganser, who believes she will not wean without decortication on the right. Surgery anticipated today.  4/29: S/P right thoracoscopy with decortication with cultures NG at 24 hrs.  4/30: Had a bronch due to hypoxia and hypotension. CXR with worsening right hemithorax pulmonary opacities. Bloody mucous plugs removed. Pressors started. Febrile this am. Tachy in 130s. Pressors just removed. Tolerating vent, but not a SBT candidate at the moment.  5/1: Small air leak CT-2 every ~ 2/3 breathes. The K. pneumoniae from her thoracoscopy is sensitive to her cefepime so no antibiotic change needed.   5/2: No air leak today she tolerated 2  hrs of spontaneous breathing with support today.      5/3: Second day with SBT and doing well now for 2 hrs. Slight bump in temperature and      WBC's but no obvious clinical infectious changed so watch closely.      5/4: Fever still from time to time. WBCs trending up. Chest tubes in place. Trach.       5/5: She clearly is better today she was sitting up in bed with open eyes and follows commands. She still has low grade fever but her WBC's are dropping. Cefepime dose increased yesterday for increased CNS coverage if necessary. She will probably need further thoracic surgery as mentioned by Dr. Ganser. The issue of what to do with her stimulator is still up in the air for now as it probably will need to be removed but she is nort a candidate for more major surgery at this time.       5/7: She continues to improve and under go longer SBT trials. She just had a new PICC placed in her right arm and plan is to D/C central line.        5/8: PICC placed. No  fevers. Comfortable. Cortrak placed. Failing SBTs.  5/9: No acute issues. No fevers. Comfortable. Family at bedside.   5/10: No acute issues, fevers or WBC bumps. No changes in condition. She is to get her CT scan today and be reviewed by surgery  5/11:  at bedside.  Has been referred to LANE.  Repeat CT scan completed.  Results noted.  ? If Dr Ganser has seen.  Still with an empyema:  ? If candidate for further surgery.  5/12: Pending next thoracic surgery. No fever. Anxious.  5/13: ? Surgery date.  No one seems to know.  Had speaking valve in and having swallow eval with speech therapy  5/14: No acute issues. No fevers. Surgery Monday.  5/15: One CT accidentally pulled out yesterday.  Has been on T piece and tolerating. Plans for surgery Monday afternoon.  5/16: On schedule for surgery 5/17: 4:30 pm.  Moved to room T614.  No new issues.  5/17: Was sitting up in the bedside chair since change of shift.  Now walking in hallways with RN and CNA.  5/18: Decortication yesterday with 2 chest tube placement. WBC improving 21->14k  5/19: Hypotension and tachycardia.  Decreased H/H.  Placed back on vent to rest.  Dr Lozano in process of placing new line.  Antibiotics were deesculated yesterday to Cefazolin.  No cx were taken at surgery.  5/20: Pt was febrile yest afternoon 38.2, but now afebrile overnight after meropenem. WBC improved 11k->8k. Fio2 30%. Phenylephrine now off this morning. CXR shows new R consolidations.   5/21: Off pressors.  On vent: FiO2 30%, PEEP 8, PS 5 Received pRBCs yesterday.  5/22: Remains afebrile, off pressors. FiO2 30%.   5/23: Afebrile. FiO2 30%. Bronchoscopy yesterday normal.   5/24: No new cultures obtained at last bronchoscopy.  On  T piece this am: FiO2 30%.  5/25: Remaining afebrile. WBC 8600.   5/26: Chest tube removed today. Possible transfer to [facility].  5/27: O2 sat usuallyl 95% but intermittent desat to 87-89%.  Remaining afebrile.WBC 6100. Alk phos 641 but normal transaminases.    5/28: Pending [facility] transfer. NO new issues overnight  5/29: Awaiting bed at [facility]. Remaining afebrile. WBC 5800, creat 0.19.  5/31. Remaining afebrile. WBC 6000. . Remains weak and frail. Alk Phos 694 with normal transaminases.  Slightly improved bilateral patchy infiltrates.     Review of Systems  Review of Systems   Unable to perform ROS: Intubated   Constitutional: Negative for chills and fever.        Did walk in cao today.    HENT:        Head droop.    Respiratory: Positive for cough (minimal.). Negative for sputum production, shortness of breath and wheezing.    Cardiovascular: Positive for chest pain (at CT site. ).   Gastrointestinal: Positive for nausea (better today.). Negative for abdominal pain and diarrhea.   Genitourinary: Negative for dysuria.   Musculoskeletal: Negative for myalgias.   Neurological: Positive for headaches (Migraine yesterday.).      Physical Exam  Temp:  [36.1 °C (97 °F)-36.8 °C (98.2 °F)] 36.3 °C (97.4 °F)  Pulse:  [] 72  Resp:  [12-18] 16  BP: ()/(55-76) 92/58  SpO2:  [88 %-99 %] 99 %    Physical Exam  Constitutional:       General: She is not in acute distress.     Comments: Weak, feeble and aphonic.   HENT:      Head: Normocephalic.      Mouth/Throat:      Mouth: Mucous membranes are dry.   Eyes:      Conjunctiva/sclera: Conjunctivae normal.      Pupils: Pupils are equal, round, and reactive to light.   Cardiovascular:      Rate and Rhythm: Normal rate and regular rhythm.      Heart sounds: No murmur heard.     Pulmonary:      Effort: Pulmonary effort is normal.      Breath sounds: Normal breath sounds.      Comments: O2 sat 93-96% on FIO2 40% per tracheostomy.  Abdominal:      General: Abdomen is flat.      Palpations: Abdomen is soft.      Tenderness: There is no abdominal tenderness.   Skin:     General: Skin is warm and dry.   Neurological:      General: No focal deficit present.      Mental Status: She is alert.   Psychiatric:      Comments: Withdrawn.      Constitutional:       Appearance: Normal appearance. Awake and alert  HENT:      Head: Normocephalic.      Comments: NG tube  Neck:      Comments: tracheostomy  Cardiovascular:      Rate and Rhythm: Normal rate and regular rhythm.   Pulmonary:      Comments: dimninished in R base  Abdominal:      General: Abdomen is flat. Bowel sounds are normal.   Neurological:      Mental Status: She is alert and oriented to person, place, and time.     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       - Acute left empyema s/p chest tube placement 4/23, decortication 5/17, chest tube removal 5/26 - Klebsiella  -Multiple acute septic pulmonary emboli bilaterally  -Acute bilateral pneumonia due to above     --Pulmonary edema  -Pseudotumor cerebri with underlying  shunt: possible arachnoiditis  -Chronic migraine headaches  -History of autoimmune vasculitis  -Elevated alkaline phosphatase & bilirubin  -Acute encephalopathy due to sepsis      - anemia due to above          - acute fever on 5/19 likely from secondary VAP          - Secondary VAP R sided pneumonia          - decubitus ulcer sacral.      Plan   Tracheal aspirate recently 5/19 no growth, blood cultures 5/19 no growth. CXR shows worsening R sided infiltrates concerning for secondary infections  after reviewing her CXR's between 5/18 and  5/19.  Repeated sputum cultures still reflecting her original Klebsiella empyema infection. She improved post operatively with defervescence and pressor weaning while on meropenem. Suggestive of either time, or      an anaerobic infection that we weren't covering. Continue meropenem for 7 to 8 days, then switch back to cefazolin or Unasyn to complete therapy for endocarditis.     - Continue cefazolin 4 week target date after decortication 6/14/21  - Received Meropenem for VAP for 7 days finished 5/26  - Will need repeat Chest CT right before completion of antibiotics  - after completion of IV abx  Will need suppression therapy afterward for her HW (spinal stimulator,  shunt  targeting MSSA with Keflex, for example).    - chest tube removed 5/26  - ESR, CRP  Dispo: Pt to transfer to [facility]. Micheal will follow patient there. Awaiting on bed availability     30 min spent  Case discussed with pt, RN

## 2021-05-31 NOTE — PROGRESS NOTES
2 RN skin check complete.   Devices in place trach, purewick, wound vac.  Skin assessed under devices intact.  Confirmed pressure ulcers found on none.  New potential pressure ulcers noted on none.  The following interventions in place: Pillows in use for support and positioning. Offloading used. Pt turns self from side to side.    Ears red and blanching.  Elbows pink and blanching.  Sacrum with patchy excoriation, red and blanching.  Heels and toes pink and blanching.  Generalized bruising to extremeties.  Tracheotomy in place, purewick in use, wound vac to right side chest wall.

## 2021-06-01 ENCOUNTER — PATIENT OUTREACH (OUTPATIENT)
Dept: HEALTH INFORMATION MANAGEMENT | Facility: OTHER | Age: 51
End: 2021-06-01

## 2021-06-01 PROCEDURE — 94669 MECHANICAL CHEST WALL OSCILL: CPT

## 2021-06-01 PROCEDURE — 99233 SBSQ HOSP IP/OBS HIGH 50: CPT | Performed by: STUDENT IN AN ORGANIZED HEALTH CARE EDUCATION/TRAINING PROGRAM

## 2021-06-01 PROCEDURE — 700102 HCHG RX REV CODE 250 W/ 637 OVERRIDE(OP): Performed by: STUDENT IN AN ORGANIZED HEALTH CARE EDUCATION/TRAINING PROGRAM

## 2021-06-01 PROCEDURE — 770020 HCHG ROOM/CARE - TELE (206)

## 2021-06-01 PROCEDURE — 94760 N-INVAS EAR/PLS OXIMETRY 1: CPT

## 2021-06-01 PROCEDURE — A9270 NON-COVERED ITEM OR SERVICE: HCPCS | Performed by: HOSPITALIST

## 2021-06-01 PROCEDURE — 94640 AIRWAY INHALATION TREATMENT: CPT

## 2021-06-01 PROCEDURE — 700111 HCHG RX REV CODE 636 W/ 250 OVERRIDE (IP): Performed by: INTERNAL MEDICINE

## 2021-06-01 PROCEDURE — A9270 NON-COVERED ITEM OR SERVICE: HCPCS | Performed by: STUDENT IN AN ORGANIZED HEALTH CARE EDUCATION/TRAINING PROGRAM

## 2021-06-01 PROCEDURE — 700102 HCHG RX REV CODE 250 W/ 637 OVERRIDE(OP): Performed by: HOSPITALIST

## 2021-06-01 RX ORDER — DIPHENHYDRAMINE HCL 25 MG
50 TABLET ORAL EVERY 6 HOURS PRN
Status: DISCONTINUED | OUTPATIENT
Start: 2021-06-01 | End: 2021-06-03

## 2021-06-01 RX ADMIN — METHADONE HYDROCHLORIDE 30 MG: 10 TABLET ORAL at 05:44

## 2021-06-01 RX ADMIN — GABAPENTIN 300 MG: 300 CAPSULE ORAL at 22:15

## 2021-06-01 RX ADMIN — DIPHENHYDRAMINE HYDROCHLORIDE 50 MG: 25 TABLET ORAL at 22:15

## 2021-06-01 RX ADMIN — DIVALPROEX SODIUM 500 MG: 125 CAPSULE ORAL at 05:44

## 2021-06-01 RX ADMIN — DIVALPROEX SODIUM 500 MG: 125 CAPSULE ORAL at 22:15

## 2021-06-01 RX ADMIN — ACETAMINOPHEN 500 MG: 325 TABLET ORAL at 14:23

## 2021-06-01 RX ADMIN — GABAPENTIN 300 MG: 300 CAPSULE ORAL at 14:23

## 2021-06-01 RX ADMIN — DIVALPROEX SODIUM 500 MG: 125 CAPSULE ORAL at 14:23

## 2021-06-01 RX ADMIN — CEFAZOLIN SODIUM 2 G: 2 INJECTION, SOLUTION INTRAVENOUS at 22:16

## 2021-06-01 RX ADMIN — GABAPENTIN 300 MG: 300 CAPSULE ORAL at 05:44

## 2021-06-01 RX ADMIN — DOCUSATE SODIUM 50 MG AND SENNOSIDES 8.6 MG 2 TABLET: 8.6; 5 TABLET, FILM COATED ORAL at 19:21

## 2021-06-01 RX ADMIN — CEFAZOLIN SODIUM 2 G: 2 INJECTION, SOLUTION INTRAVENOUS at 14:22

## 2021-06-01 RX ADMIN — ACETAMINOPHEN 500 MG: 325 TABLET ORAL at 08:34

## 2021-06-01 RX ADMIN — OXYCODONE 5 MG: 5 TABLET ORAL at 07:58

## 2021-06-01 RX ADMIN — PAROXETINE HYDROCHLORIDE 10 MG: 10 TABLET, FILM COATED ORAL at 05:44

## 2021-06-01 RX ADMIN — DULOXETINE HYDROCHLORIDE 60 MG: 60 CAPSULE, DELAYED RELEASE ORAL at 19:20

## 2021-06-01 RX ADMIN — DULOXETINE HYDROCHLORIDE 60 MG: 60 CAPSULE, DELAYED RELEASE ORAL at 05:44

## 2021-06-01 RX ADMIN — ENOXAPARIN SODIUM 40 MG: 40 INJECTION SUBCUTANEOUS at 05:45

## 2021-06-01 RX ADMIN — DOCUSATE SODIUM 50 MG AND SENNOSIDES 8.6 MG 2 TABLET: 8.6; 5 TABLET, FILM COATED ORAL at 05:44

## 2021-06-01 RX ADMIN — OXYCODONE 5 MG: 5 TABLET ORAL at 14:28

## 2021-06-01 RX ADMIN — DIPHENHYDRAMINE HYDROCHLORIDE 50 MG: 25 TABLET ORAL at 14:23

## 2021-06-01 RX ADMIN — RISPERIDONE 2 MG: 2 TABLET ORAL at 19:20

## 2021-06-01 RX ADMIN — CEFAZOLIN SODIUM 2 G: 2 INJECTION, SOLUTION INTRAVENOUS at 05:40

## 2021-06-01 RX ADMIN — ACETAMINOPHEN 500 MG: 325 TABLET ORAL at 22:15

## 2021-06-01 RX ADMIN — RISPERIDONE 2 MG: 2 TABLET ORAL at 05:44

## 2021-06-01 RX ADMIN — OXYCODONE 5 MG: 5 TABLET ORAL at 19:20

## 2021-06-01 ASSESSMENT — ENCOUNTER SYMPTOMS
CHILLS: 0
FEVER: 0
ABDOMINAL PAIN: 0
COUGH: 0
DIARRHEA: 0
NAUSEA: 0
CONSTIPATION: 0
SHORTNESS OF BREATH: 0
VOMITING: 0

## 2021-06-01 ASSESSMENT — PAIN DESCRIPTION - PAIN TYPE
TYPE: ACUTE PAIN

## 2021-06-01 NOTE — PROGRESS NOTES
Bedside report received from CAROLINA Dubois. POC discussed with pt; all questions answered at this time. Fall precautions in place. Call light within reach.

## 2021-06-01 NOTE — PROGRESS NOTES
Assumed care of patient at 0700hrs, bedside report from NOC RN. Updated on POC. Assessment completed. Call light within reach. Whiteboard updated. Fall precautions in place. Bed locked and in lowest position. All questions answered. No other needs indicated at this time.

## 2021-06-01 NOTE — PROGRESS NOTES
Infectious Disease Progress Note    Author: Brian Omer M.D. Date & Time created: 6/1/2021  2:41 PM     Cefazolin 5/26-present.     Meropenem 5/19-5/26   Cefepime 4/23 5/18-  s/p 23 days  Cefazolin 5/18-5/19     Thoracoscopy & Decortication - 4/28  Decortication 5/17     PRIOR Rx:   Zosyn 4/22-4/23  Previous: Unasyn, Zosyn, Vanco, Daptomycin  Ceftaroline: start 4/13-4/15  Nafcillin 2g Q4 hrs 4/15-4/23 4/14: Unable to give name of previous NSG or neurologist. Seems confused, but able to say some sentences fluently.   4/15: Line cultures now growing MSSA. As well as from the blood. Repeat blood culture 4/13+ in both sets. Patient has worsening confusion. CSF fluid analyses suggest pleocytosis. Cultures are no growth from the CSF. Chest CT was ordered this morning indicating a loculated right pleural effusion as well as bilateral septic emboli. Dr. Mack spoke with Dr. Oh yesterday and no surgical intervention unless clinical deterioration.  4/16: Increased respiratory distress.  Tachypneic.  CT with loculated collection.  Dr Resendiz not available.  No thoracic surgeon available.  Plans to have pulmonary place CT.  Transferring to ICU.  4/17: Transferred to [facility] last night. Hgb dropped to 6.4 requiring PRBC transfusion. Requiring 2 pressors. Fio2 50%. Febrile 38.8. Pending OR decortication of empyema and hemothorax. [facility] at [facility] shows 8,371 WBC, ,000, 74% N  4/18: Chest tube was upsized by surgery yesterday, no decortication. WBC 22k. Fio2 40%. Vasopressin. Levophed down to 2mcg. Afebrile 24 hrs. Last fever 38.6 on 4/16.   4/19: Still on vent. Levo 3 mcg, vasopressin. Afebrile 72 hours. WBC 21k. BC 4/17 NGTD x 48 hours. Unable to do MRI brain given neurostimulator per RN.   4/20: Remaining afebrile. Hb 6.2 and undergoing transfusion today.WBC 24,100, 5% bands.1700 ml CT output. Copious bloody ET secretions. No pressors. Receiving dornase and TPA per CT. Right pleural effusion not  large per CXR today.   4/21: WBC 31k. Bronchoscopy yesterday showing blood. Cultures sent. TPA on hold per RN due to arvind blood from chest tube requiring PRBC transfusion for hgb 6. Pending chest CT today. Per , spinal stimulator is non-MRI compatible. Levophed 10mcg. 30% Fio2. Afebrile.   4/22: Fever 101.3 this am. WBC 20,300 down from 32,200 yesterday. BAL growing Klebsiella pneumoniae but susceptibility panel not set up until today. CTA of brain shows no lesion. CT of chest shows enlarging cavitary lung lesions bilat and large loculated new left pleural effusion and large hydropneumothorax on right. TPA & dornase infusions of right chest ended 4/20 after considerable bleeding requiring transfusion. Hb now down again to 6.8.  4/23: WBC 23k. Fio2 40%. Tmax 38.1. US of stiumlator shows small fluid collection but no drainable abscess. Pending MICAH today. Pending L chest tube placement  4/24: Continue fever 100.8-101.8. WBC 16,600. MICAH shows large vegetations on both tricuspid valve and mitral valve with mild TR & MR.  No aortic root abscess. Left chest tube placed yesterday yielding 8 ml of old bloody fluid. Bronch today revealed copious thick maroon secretions in distal trachea and both mainstem bronchi. On the right these were obstructive. Remaining off pressors. Last positive blood cultures (MSSA) were 4/16, negative 4/17.  4/25: Fever 100.6 this AM. wBC 13,300. Hb 6.6. CT: right hydropneumothorax increasing, right bronchopleural fistula, mediastinal shift ot the left , multiple cavitary pulmonary nodules, 3.1 cm left basilar mass, splenomegaly. CT of head shows dural thickening over the clivus. Lac+ GNR >100,000 col/ml growing from BAL of 4/24, presumed Klebsiella. Left peural fluid culture negative from 4/23.  4/26: Fever 100.4 last night. WBC 13,500. Hb 7.4. Plts 502,000. ESR >120. UA fairly clear except 30 mg% protein, 2-5 wbc and rare rbc. CXR unchanged except more prominent pulmonary edema. GNR in BAL  may be a different species of Klebsiella, and resistant to ampicillin & tmp-sulfa; confirmation pending.  4/28: Fever 100.8 last night. WBC 27,700. Hb 6.6. Plts 482,000. Creat 0.19. Kleb pneum in BAL on 4/24 is resistant to ampicillin & tmp-sulfa but otherwise sensitive. O2 sat 96% on FIO2 30% now, PEEP 8. Has been re-evaluated by thoracic surgeon, Dr. Ganser, who believes she will not wean without decortication on the right. Surgery anticipated today.  4/29: S/P right thoracoscopy with decortication with cultures NG at 24 hrs.  4/30: Had a bronch due to hypoxia and hypotension. CXR with worsening right hemithorax pulmonary opacities. Bloody mucous plugs removed. Pressors started. Febrile this am. Tachy in 130s. Pressors just removed. Tolerating vent, but not a SBT candidate at the moment.  5/1: Small air leak CT-2 every ~ 2/3 breathes. The K. pneumoniae from her thoracoscopy is sensitive to her cefepime so no antibiotic change needed.   5/2: No air leak today she tolerated 2  hrs of spontaneous breathing with support today.      5/3: Second day with SBT and doing well now for 2 hrs. Slight bump in temperature and      WBC's but no obvious clinical infectious changed so watch closely.      5/4: Fever still from time to time. WBCs trending up. Chest tubes in place. Trach.       5/5: She clearly is better today she was sitting up in bed with open eyes and follows commands. She still has low grade fever but her WBC's are dropping. Cefepime dose increased yesterday for increased CNS coverage if necessary. She will probably need further thoracic surgery as mentioned by Dr. Ganser. The issue of what to do with her stimulator is still up in the air for now as it probably will need to be removed but she is nort a candidate for more major surgery at this time.       5/7: She continues to improve and under go longer SBT trials. She just had a new PICC placed in her right arm and plan is to D/C central line.        5/8: PICC  placed. No fevers. Comfortable. Cortrak placed. Failing SBTs.  5/9: No acute issues. No fevers. Comfortable. Family at bedside.   5/10: No acute issues, fevers or WBC bumps. No changes in condition. She is to get her CT scan today and be reviewed by surgery  5/11:  at bedside.  Has been referred to LANE.  Repeat CT scan completed.  Results noted.  ? If Dr Ganser has seen.  Still with an empyema:  ? If candidate for further surgery.  5/12: Pending next thoracic surgery. No fever. Anxious.  5/13: ? Surgery date.  No one seems to know.  Had speaking valve in and having swallow eval with speech therapy  5/14: No acute issues. No fevers. Surgery Monday.  5/15: One CT accidentally pulled out yesterday.  Has been on T piece and tolerating. Plans for surgery Monday afternoon.  5/16: On schedule for surgery 5/17: 4:30 pm.  Moved to room T614.  No new issues.  5/17: Was sitting up in the bedside chair since change of shift.  Now walking in hallways with RN and CNA.  5/18: Decortication yesterday with 2 chest tube placement. WBC improving 21->14k  5/19: Hypotension and tachycardia.  Decreased H/H.  Placed back on vent to rest.  Dr Lozano in process of placing new line.  Antibiotics were deesculated yesterday to Cefazolin.  No cx were taken at surgery.  5/20: Pt was febrile yest afternoon 38.2, but now afebrile overnight after meropenem. WBC improved 11k->8k. Fio2 30%. Phenylephrine now off this morning. CXR shows new R consolidations.   5/21: Off pressors.  On vent: FiO2 30%, PEEP 8, PS 5 Received pRBCs yesterday.  5/22: Remains afebrile, off pressors. FiO2 30%.   5/23: Afebrile. FiO2 30%. Bronchoscopy yesterday normal.   5/24: No new cultures obtained at last bronchoscopy.  On  T piece this am: FiO2 30%.  5/25: Remaining afebrile. WBC 8600.   5/26: Chest tube removed today. Possible transfer to [facility].  5/27: O2 sat usuallyl 95% but intermittent desat to 87-89%.  Remaining afebrile.WBC 6100. Alk phos 641 but normal  transaminases.   : Pending [facility] transfer. NO new issues overnight  : Awaiting bed at [facility]. Remaining afebrile. WBC 5800, creat 0.19.  . Remaining afebrile. WBC 6000. . Remains weak and frail. Alk Phos 694 with normal transaminases.  Slightly improved bilateral patchy infiltrates.   : She is anxious to move on but still waiting for insurance approval to [facility].     Interval History:  Past 24 hrs reviewed with RN    Labs Reviewed, Medications Reviewed, Radiology Reviewed, Wound Reviewed, Fluids Reviewed and GI Nutrition Reviewed    Review of Systems:  Review of Systems   Constitutional: Negative for chills and fever.   Respiratory: Negative for cough and shortness of breath.    Cardiovascular: Negative for chest pain.   Gastrointestinal: Negative for abdominal pain, constipation, diarrhea, nausea and vomiting.   Skin: Negative for itching.       Physical Exam:  Physical Exam  Vitals and nursing note reviewed.   Constitutional:       General: She is not in acute distress.     Appearance: She is not ill-appearing, toxic-appearing or diaphoretic.      Comments: Weak.   HENT:      Head: Normocephalic and atraumatic.   Cardiovascular:      Rate and Rhythm: Regular rhythm. Tachycardia present.      Heart sounds: No murmur heard.     Pulmonary:      Effort: No respiratory distress.      Breath sounds: No wheezing or rhonchi.      Comments: BS decreased on left vs right.  Abdominal:      General: Abdomen is flat.      Palpations: Abdomen is soft.      Tenderness: There is no abdominal tenderness. There is no guarding.   Skin:     General: Skin is warm and dry.   Neurological:      Mental Status: She is alert and oriented to person, place, and time.   Psychiatric:      Comments: Flat affect.         Labs:  No results found for this or any previous visit (from the past 24 hour(s)).  Results     ** No results found for the last 168 hours. **        Hemodynamics:  Temp (24hrs), Av.7 °C (98.1 °F), Min:36.4 °C  (97.6 °F), Max:37.1 °C (98.7 °F)  Temperature: 37.1 °C (98.7 °F)  Pulse  Av.4  Min: 40  Max: 220   Blood Pressure: 101/65    PICC Double Lumen 21 Lumen 1: Purple Lumen 2: Red Right Brachial (Active)   Site Assessment Clean;Dry;Intact 21   Lumen 1 Status Flushed;Scrubbed the hub prior to access;Normal saline locked 21   Lumen 2 Status Flushed;Scrubbed the hub prior to access;Normal saline locked 21   Line Secured at (cm) 1 cm 21   Extremity Circumference (cm) 30.5 cm 21   Dressing Type Biopatch;Occlusive;Securing device;Transparent 21   Dressing Status Clean;Dry;Intact 21   Dressing Intervention N/A 21   Dressing Change Due 21   Date Primary Tubing Changed 21 1300   Date Secondary Tubing Changed 21 1300   Date IV Connector(s) Changed 21 2100   NEXT Primary Tubing Change  21   NEXT Secondary Tubing Change  21   NEXT IV Connector(s) Change 21 2100   Line Necessity Assessed Antibiotic Therapy Greater than 7 Days 21   $ Double Lumen PICC Charge Single kit used 21 1118     Wound:  @WOUNDLDA(4)@     Fluids:  Intake/Output                             21 - 21 0659 21 - 2159 21 - 21 Total  Total  Total                    Intake    P.O.  320  120 440  360  200 560  --  -- --    P.O. 320 120 440 360 200 560 -- -- --    Total Intake 320 120 440 360 200 560 -- -- --       Output    Urine  --  -- --  --  200 200  1000  -- 1000    Number of Times Voided 1 x -- 1 x 1 x 1 x 2 x -- -- --    Output (mL) (External Urinary Catheter (Female Wick)) -- -- -- --  -- 1000    Total Output -- -- -- --  -- 1000       Net I/O     320 120 440 360 0 360 -1000 -- -1000            GI/Nutrition:  Orders Placed This Encounter   Procedures   • Diet Order Diet: Level 4 - Pureed (speaking valve on for all PO intake); Liquid level: Level 2 - Mildly Thick; Tray Modifications (optional): No Straws, SLP - 1:1 Supervision by Nursing, SLP - Deliver to Nursing Station     Standing Status:   Standing     Number of Occurrences:   1     Order Specific Question:   Diet:     Answer:   Level 4 - Pureed [25]     Comments:   speaking valve on for all PO intake     Order Specific Question:   Liquid level     Answer:   Level 2 - Mildly Thick     Order Specific Question:   Tray Modifications (optional)     Answer:   No Straws     Order Specific Question:   Tray Modifications (optional)     Answer:   SLP - 1:1 Supervision by Nursing     Order Specific Question:   Tray Modifications (optional)     Answer:   SLP - Deliver to Nursing Station     Medications:  Current Facility-Administered Medications   Medication Last Admin   • diphenhydrAMINE (BENADRYL) tablet/capsule 50 mg 50 mg at 06/01/21 1423   • acetaminophen (TYLENOL) tablet 500 mg 500 mg at 06/01/21 1423   • divalproex (DEPAKOTE SPRINKLE) capsule 500 mg 500 mg at 06/01/21 1423   • DULoxetine (CYMBALTA) capsule 60 mg 60 mg at 06/01/21 0544   • gabapentin (NEURONTIN) capsule 300 mg 300 mg at 06/01/21 1423   • methadone (DOLOPHINE) tablet 30 mg 30 mg at 06/01/21 0544   • PARoxetine (PAXIL) tablet 10 mg 10 mg at 06/01/21 0544   • risperiDONE (RISPERDAL) tablet 2 mg 2 mg at 06/01/21 0544   • senna-docusate (PERICOLACE or SENOKOT S) 8.6-50 MG per tablet 2 tablet 2 tablet at 06/01/21 0544    And   • polyethylene glycol/lytes (MIRALAX) PACKET 1 Packet      And   • magnesium hydroxide (MILK OF MAGNESIA) suspension 30 mL      And   • bisacodyl (DULCOLAX) suppository 10 mg     • acetaminophen (Tylenol) tablet 650 mg 650 mg at 05/30/21 0457   • diazePAM (VALIUM) tablet 5 mg 5 mg at 05/30/21 2030   • oxyCODONE immediate-release (ROXICODONE) tablet 5 mg 5 mg at  06/01/21 1428   • ceFAZolin in dextrose (ANCEF) IVPB premix 2 g 2 g at 06/01/21 1422   • enoxaparin (LOVENOX) inj 40 mg 40 mg at 06/01/21 0545   • Respiratory Therapy Consult     • ondansetron (ZOFRAN) syringe/vial injection 4 mg 4 mg at 05/31/21 1910   • labetalol (NORMODYNE/TRANDATE) injection 10 mg 10 mg at 05/04/21 0226   • ipratropium-albuterol (DUONEB) nebulizer solution       Medical Decision Making, by Problem:  Active Hospital Problems    Diagnosis    • *Empyema of right pleural space (HCC) [J86.9]    • Shock (HCC) [R57.9]    • Debility [R53.81]    • Elevated alkaline phosphatase level [R74.8]    • Agitation requiring sedation protocol [R45.1]    • Spinal cord stimulator status [Z96.89]    • Goals of care, counseling/discussion [Z71.89]    • Fever [R50.9]    • Pulmonary abscess (HCC) [J85.2]    • 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]    • Pleural effusion, left [J90]    • Anasarca [R60.1]    • Thrombocytosis (HCC) [D47.3]    • Atelectasis [J98.11]    • Acute respiratory failure with hypoxia (HCC) [J96.01]    • Prolonged Q-T interval on ECG [R94.31]    • Acute bacterial endocarditis [I33.0]    • CSF pleocytosis [D72.9]    • Bacteremia due to methicillin susceptible Staphylococcus aureus (MSSA) [R78.81, B95.61]    • History of vasculitis [Z86.79]    • Pneumonia [J18.9]    • History of complicated migraines [G43.109]    • GERD (gastroesophageal reflux disease) [K21.9]    • Hyponatremia [E87.1]    • Tachycardia [R00.0]    • Pseudotumor cerebri [G93.2]    • H/O: CVA (cerebrovascular accident) [Z86.73]    • Chronic pain syndrome [G89.4]    • Port or reservoir infection [T80.212A]    • Thrombocytopenia (HCC) [D69.6]    • Septic shock (HCC) [A41.9, R65.21]    • Anemia [D64.9]    • Sepsis (HCC) [A41.9]    • Hypokalemia [E87.6]    • Hypomagnesemia [E83.42]    • S/P  shunt [Z98.2]    • Anxiety [F41.9]    • Acute encephalopathy [G93.40]      Impression   -Severe sepsis  -Catheter related bloodstream infection due to infected port  status post removal 4/12-staph aureus  -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       - Acute left empyema s/p chest tube placement 4/23, decortication 5/17, chest tube removal 5/26 - Klebsiella  -Multiple acute septic pulmonary emboli bilaterally  -Acute bilateral pneumonia due to above     --Pulmonary edema  -Pseudotumor cerebri with underlying  shunt: possible arachnoiditis  -Chronic migraine headaches  -History of autoimmune vasculitis  -Elevated alkaline phosphatase & bilirubin  -Acute encephalopathy due to sepsis      - anemia due to above          - acute fever on 5/19 likely from secondary VAP          - Secondary VAP R sided pneumonia          - decubitus ulcer sacral.      Plan   Tracheal aspirate recently 5/19 no growth, blood cultures 5/19 no growth. CXR shows worsening R sided infiltrates concerning for secondary infections after reviewing her CXR's between 5/18 and  5/19.  Repeated sputum cultures still reflecting her original Klebsiella empyema infection. She improved post operatively with defervescence and pressor weaning while on meropenem.   - Continue cefazolin 4 week target date after decortication 6/14/21  - Received Meropenem for VAP for 7 days finished 5/26  - Will need repeat Chest CT right before completion of antibiotics  - After completion of IV abx  Will need suppression therapy afterward for her HW (spinal stimulator,  shunt  targeting MSSA with Keflex, for example).    - Chest tube removed 5/26.    She is anxious to move on but still waiting for insurance approval to \[facility]". Case and treatment reviewed with patient, , micro, RN and CM 30+ min on floor in direct patient care/counseling and D/C planning today.

## 2021-06-01 NOTE — PROGRESS NOTES
Hospital Medicine Daily Progress Note    Date of Service  6/1/2021    Chief Complaint  50 y.o. female admitted 4/16/2021 with recurrent infections    Hospital Course   This is a 50-year-old female with a past medical history of pseudotumor cerebri s/p  shunt implantation by Dr. Oh neurosurgery, chronic pain syndrome with a history of placement of a nerve stimulator, vasculitis, right anterior chest port for home IV medication administration, recurrent infections related to port, presented on 4/11 to [facility] with recurrent port infection was initially treated with vancomycin and Zosyn and then changed to ceftaroline subsequently switched to nafcillin, MSSA identified.  [facility] infectious disease is managing antibiotics for the patient.  Dr. Candelaria surgery removed the port on 4/12.  The patient was further identified to have endocarditis of the tricuspid valve, a lumbar puncture performed on 4/14 showed pleocytosis with negative Gram stain.  The patient suffered worsening leukocytosis and was found to have septic emboli per CT.  Initially a small pleural catheter was placed.  Significant loculations were encountered.  MSSA empyema was diagnosed.  Neurosurgery was consulted to comment on possibly removal of a  shunt, this was felt not appropriate at this time.  The patient remained on mechanical ventilation for 15 days, then a perc trach was placed.  The patient was eventually liberated from mechanical ventilation, the patient did have a right thoracoscopy and decortication of the lung performed on 4/28 by Dr. Ganser.  The patient was of identified to have multiple sources for MSSA including mitral valve, tricuspid valve vegetation, port, spinal stimulator,  shunt possibly.  Patient underwent repeat chest surgery, decortication with 2 chest tube placement on 5/18, had to be placed back on mechanical ventilation after surgery, central access was replaced on 5/19, the patient treated  in ICU with eventual chest tube removal with assistance of thoracic surgery and was deescalated in terms of respiratory support down to trach collar.  [place] in need of extensive rehabilitation, plan for LTAC level of care on discharge.    Interval Problem Update  Patient seen and examined today.    Patient tolerating treatment and therapies.  All Data, Medication data reviewed.  Case discussed with nursing as available.  Plan of Care reviewed with patient and notified of changes.  5/26 the patient on T-piece, 30% FiO2, no increase in work of breathing, small amount of secretions, good cough, chest x-ray improving, chest surgeon removed both chest tubes, ongoing antibiotic treatment with assistance of [facility] infectious disease.  Transfer options being evaluated.  Afebrile, tachycardic with heart rate in the 1 teens, blood pressure is soft but maintaining MAP.  Laboratory data reviewed, electrolytes are balanced, no leukocytosis, improving hemoglobin, chest x-ray with stable lines and tubes, stable ill-defined bilateral pulmonary opacities, no large effusions  5/27 the patient appears better, she is ambulating, the patient was cleared for diet, the patient is awake alert and following, afebrile, blood pressure between 90 and 130, no increase in respiratory difficulty, afebrile, ongoing antibiotic treatment through 6/14  Ongoing rehab efforts, awaiting LTAC approval    5/28 patient is in bed, core track removed, patient is tolerating diet, family is at bedside, discussed with , LTAC pending, patient tachycardic any symptoms no fever, blood pressure is soft but stable.  5/29 patient in bed, no fever or chills. Tolerating diet, vs stable. On T piece o2 requirement stable.   5/30 in bed looks comfortable, not in pain, o2 requirement increasing I will ordered cxr, discussed with RT today, continue iv abx per ID.   5/31 in bed feeling ok, o2 requirements decreased to 5L, no fever or chills. Discussed with nurse  staff.   6/1 Patient reports breathing has improved. Still with some pain at thoracotomy site, otherwise appears comfortable. Continue Abx until 6/14 per ID, will need long term Abx suppression with keflex following completion of Abx.    Consultants/Specialty  ID  Thoracic surgery  Pulmonary critical care    Code Status  Full Code    Disposition  Rehab, hopefully LTAC    Review of Systems  Review of Systems   Unable to perform ROS: Other   Poor communication secondary to tracheostomy    Physical Exam  Temp:  [36.6 °C (97.9 °F)-37.1 °C (98.7 °F)] 36.6 °C (97.9 °F)  Pulse:  [] 100  Resp:  [15-18] 16  BP: ()/(55-68) 103/62  SpO2:  [92 %-97 %] 96 %    Physical Exam  Vitals and nursing note reviewed.   Constitutional:       General: She is not in acute distress.     Appearance: She is well-developed. She is ill-appearing. She is not diaphoretic.   HENT:      Head: Normocephalic and atraumatic.   Eyes:      General: No scleral icterus.  Neck:      Vascular: No JVD.   Cardiovascular:      Rate and Rhythm: Regular rhythm. Tachycardia present.   Pulmonary:      Effort: Pulmonary effort is normal. No respiratory distress.      Breath sounds: No stridor. Rales present.      Comments: Trach without inflammation  Abdominal:      General: There is no distension.      Palpations: Abdomen is soft.      Tenderness: There is no abdominal tenderness.   Skin:     General: Skin is warm and dry.      Coloration: Skin is pale.      Findings: No rash.   Neurological:      Mental Status: She is alert and oriented to person, place, and time.      Motor: Weakness present.   Psychiatric:         Thought Content: Thought content normal.         Fluids    Intake/Output Summary (Last 24 hours) at 6/1/2021 1632  Last data filed at 6/1/2021 0700  Gross per 24 hour   Intake 200 ml   Output 1200 ml   Net -1000 ml       Laboratory  Recent Labs     05/31/21  0652   WBC 6.0   RBC 3.33*   HEMOGLOBIN 9.5*   HEMATOCRIT 31.0*   MCV 93.1   MCH  28.5   MCHC 30.6*   RDW 54.9*   PLATELETCT 350   MPV 9.6     Recent Labs     05/31/21  0652   SODIUM 133*   POTASSIUM 3.8   CHLORIDE 95*   CO2 29   GLUCOSE 154*   BUN 7*   CREATININE 0.51   CALCIUM 9.0                   Imaging  DX-CHEST-PORTABLE (1 VIEW)   Final Result      1.  Interval removal of the right-sided chest tubes. No pneumothorax is identified.   2.  Airspace opacities in the right are mildly improved and may represent atelectasis/consolidation.   3.  There is likely a small right pleural effusion.   4.  Patchy opacities on the left are mildly improved.   5.  Interval removal of the enteric tube.      DX-CHEST-PORTABLE (1 VIEW)   Final Result         1. Stable lines and tubes.   2. Stable ill-defined bilateral pulmonary opacities, right worse than left. No large pleural effusions.         DX-CHEST-PORTABLE (1 VIEW)   Final Result      1.  Decrease in volume of right pleural fluid collection/empyema with 2 right chest tubes in place. No pneumothorax identified.      2.  No focal consolidation in the left lung.      DX-CHEST-PORTABLE (1 VIEW)   Final Result      No significant interval change.      DX-CHEST-PORTABLE (1 VIEW)   Final Result         1.  Hazy right pulmonary infiltrates and/or effusion, stable compared to prior study.      DX-CHEST-PORTABLE (1 VIEW)   Final Result         1.  Hazy right pulmonary infiltrates and/or effusion, stable compared to prior study.      DX-CHEST-PORTABLE (1 VIEW)   Final Result      1.  All lines and tubes appear appropriately located      2.  Consolidation throughout the right lung consistent with pneumonia      DX-CHEST-PORTABLE (1 VIEW)   Final Result         1.  Hazy right pulmonary infiltrates and/or effusion, increased compared to prior study.      DX-ABDOMEN FOR TUBE PLACEMENT   Final Result      Feeding tube looped in the stomach.      DX-CHEST-PORTABLE (1 VIEW)   Final Result         1. Stable lines and tubes. No pneumothorax detected.   2. Persistent  opacities in the right perihilar region and in the right lung periphery, similar to prior study. Left lung is essentially clear.      DX-CHEST-PORTABLE (1 VIEW)   Final Result      Postoperative changes of the right chest with placement of an additional chest tube. There is improved aeration in the right lung. No significant pleural effusion or definite pneumothorax.      Right IJ central venous catheter tip projects over the proximal right atrium.      Hypoinflation with interstitial and hazy pulmonary opacities.      DX-CHEST-PORTABLE (1 VIEW)   Final Result         1.  Hazy right pulmonary infiltrates and/or effusion, similar compared to prior study.      DX-ABDOMEN FOR TUBE PLACEMENT   Final Result      Feeding tube tip projects over the expected location the gastric antrum.      DX-CHEST-PORTABLE (1 VIEW)   Final Result         1.  Hazy right pulmonary infiltrates and/or effusion, similar compared to prior study.      DX-CHEST-LIMITED (1 VIEW)   Final Result      1.  No pneumothorax. Only one chest tube is now seen in the right lung.   2.  The chest is otherwise stable.      DX-CHEST-PORTABLE (1 VIEW)   Final Result         1.  Hazy right pulmonary infiltrates and/or effusion, similar compared to prior study.      DX-CHEST-PORTABLE (1 VIEW)   Final Result         1.  Hazy right pulmonary infiltrates and/or effusion, similar compared to prior study.      DX-ABDOMEN FOR TUBE PLACEMENT   Final Result         1.  Nonspecific bowel gas pattern.   2.  Dobbhoff tube tip terminates overlying the expected location of the gastric antrum.   3.  Hazy bilateral lung base infiltrates, greater on the right.      DX-CHEST-PORTABLE (1 VIEW)   Final Result         1.  Hazy right pulmonary infiltrates and/or effusion, similar compared to prior study.      US-RUQ   Final Result         1.  Hepatomegaly   2.  Mild intrahepatic and extrahepatic biliary ductal dilatation, commonly associated with postcholecystectomy physiology,  consider causes of biliary obstruction with additional workup as clinically appropriate      CT-CHEST,ABDOMEN,PELVIS WITH   Final Result      1.  Large empyema in the RIGHT hemithorax, slightly decreased in size from prior exam with chest tubes present.   2.  Consolidation remains.   3.  Multiple cavitary lesions again seen in the LEFT upper lobe, minimally decreased from prior, concerning for septic emboli.   4.  Supportive tubing is unchanged.   5.  Intrahepatic and extrahepatic biliary dilation, likely postoperative although distal stricture, stone or mass remain possibilities.   6.  Moderate pelvic fluid, nonspecific.   7.  No evidence of bowel obstruction or perforation.      DX-CHEST-PORTABLE (1 VIEW)   Final Result         1.  Hazy right pulmonary infiltrates and/or effusion, similar compared to prior study.      DX-CHEST-PORTABLE (1 VIEW)   Final Result         No significant interval change.            DX-CHEST-PORTABLE (1 VIEW)   Final Result         Interval removal of left central venous catheter. Interval adjustment of right PICC with tip in the SVC.         DX-ABDOMEN FOR TUBE PLACEMENT   Final Result      Feeding tube tip at the mid to distal stomach.      IR-PICC LINE PLACEMENT W/ GUIDANCE > AGE 5   Final Result                  Ultrasound-guided PICC placement performed by qualified nursing staff as    above.          DX-CHEST-PORTABLE (1 VIEW)   Final Result      1.  Stable cardiopulmonary findings.   2.  See comments above regarding the right-sided PICC position.      DX-CHEST-PORTABLE (1 VIEW)   Final Result      Stable chest findings compared to 5/5.      DX-CHEST-PORTABLE (1 VIEW)   Final Result      Stable chest findings compared with 5/3.      DX-ABDOMEN FOR TUBE PLACEMENT   Final Result         Feeding tube with tip projecting over the expected area of the stomach.      DX-CHEST-PORTABLE (1 VIEW)   Final Result      Stable chest findings compared with prior.      DX-CHEST-PORTABLE (1 VIEW)    Final Result         1. No significant interval change.      US-EXTREMITY ARTERY LOWER UNILAT RIGHT   Final Result      DX-CHEST-PORTABLE (1 VIEW)   Final Result         1. No significant interval change.      IR-PICC LINE PLACEMENT W/ GUIDANCE > AGE 5   Final Result                  Ultrasound-guided PICC placement performed by qualified nursing staff as    above.          DX-CHEST-PORTABLE (1 VIEW)   Final Result         1.  Pulmonary edema and/or infiltrates, similar to prior study.   2.  Stable opacification right lung, likely effusion. Thoracostomy tubes are in place.   3.  Multiple cavitary appearing left pulmonary lesions, see CT performed April 27, 2021 for further characterization      DX-CHEST-PORTABLE (1 VIEW)   Final Result         1.  Pulmonary edema and/or infiltrates, similar to prior study.   2.  Single opacification right lung, likely effusion. Thoracostomy tubes are in place.   3.  Multiple cavitary appearing left pulmonary lesions, see CT performed April 27, 2021 for further characterization   4.  Soft tissue gas in the right chest wall      DX-CHEST-PORTABLE (1 VIEW)   Final Result         1.  Pulmonary edema and/or infiltrates, similar to prior study.   2.  Increased opacification right lung, likely increased effusion. Thoracostomy tubes are in place.   3.  Multiple cavitary appearing left pulmonary lesions, see CT performed April 27, 2021 for further characterization   4.  Soft tissue gas in the right chest wall      DX-CHEST-PORTABLE (1 VIEW)   Final Result         1.  Pulmonary edema and/or infiltrates, similar to prior study.   2.  Right pneumothorax, stable compared to prior study, right thoracostomy tubes remain in place   3.  Multiple cavitary appearing left pulmonary lesions, see CT performed April 27, 2021 for further characterization   4.  Soft tissue gas in the right chest wall      DX-CHEST-PORTABLE (1 VIEW)   Final Result         1.  Pulmonary edema and/or infiltrates, similar to  prior study.   2.  Right pneumothorax, interval decreased compared to prior study, interval placement of right thoracostomy tubes is noted.   3.  Multiple cavitary appearing left pulmonary lesions, see CT performed yesterday for further characterization   4.  Soft tissue gas in the right chest wall      DX-CHEST-PORTABLE (1 VIEW)   Final Result         1.  Pulmonary edema and/or infiltrates, similar to prior study.   2.  Right pneumothorax, stable compared to prior study, interval removal of right thoracostomy tube is noted.   3.  Multiple cavitary appearing left pulmonary lesions, see CT performed yesterday for further characterization   4.  Soft tissue gas in the right chest wall      DX-CHEST-LIMITED (1 VIEW)   Final Result         No significant interval change.      CT-CTA CHEST PULMONARY ARTERY W/ RECONS   Final Result         No CT evidence of pulmonary embolus.      Previous right chest tube were removed.      Grossly similar in size of the loculated right hydropneumothorax but there is increased layering fluid component. Unchanged mild shift of the mediastinal to the left.      Redemonstration of a pigtail catheter in the medial left lung base. Grossly similar multiple cavitary lesions in the left lung.      US-EXTREMITY VENOUS LOWER BILAT   Final Result      POCT BUTTERFLY-DUPLEX SCAN EXTREMITY VEINS LIMITED   Final Result      DX-CHEST-PORTABLE (1 VIEW)   Final Result         1.  Pulmonary edema and/or infiltrates, similar to prior study.   2.  Right pneumothorax, somewhat decreased to prior study, interval removal of right thoracostomy tube is noted.   3.  Multiple cavitary appearing left pulmonary lesions, see CT performed yesterday for further characterization   4.  Soft tissue gas in the right chest wall      DX-CHEST-PORTABLE (1 VIEW)   Final Result         1.  Pulmonary edema and/or infiltrates, similar to prior study.   2.  Right pneumothorax, similar to prior study with thoracostomy tube in place.    3.  Multiple cavitary appearing left pulmonary lesions, see CT performed yesterday for further characterization      CT-CHEST (THORAX) W/O   Final Result      Large loculated right hydropneumothorax with interval increase in size of the pneumothorax and pleural fluid.      Right chest tube is in the posterior right thorax.      There is mediastinal shift to the left compatible with tension.      Small pericardial effusion.      Small loculated left pleural effusion with overlying atelectasis/consolidation.   Pigtail catheter is seen at the medial left lung base. Pleural effusion has decreased in size compared to prior.      There are multiple foci of air extending from the right lung to the pleural space suspicious for a bronchopleural fistula.      Multiple cavitary lesions bilaterally with mild interval decrease of the solid component of the cavitary nodules.      Noncavitary 3.1 cm masslike density is seen at the left lung base.      Findings may be related to septic emboli, malignancy or multifocal abscesses. Short interval follow-up recommended.      Soft tissue air on the right is again seen.      Splenomegaly      Findings discussed with Leti Sun.      DX-CHEST-PORTABLE (1 VIEW)   Final Result      No significant change from prior exam.      CT-HEAD W/O   Final Result      1.  RIGHT frontal ventriculostomy catheter unchanged in position.   2.  Mild cortical atrophy.   3.  No intracranial hemorrhage.   4.  Apparent dural thickening overlying the clivus.  Consider further evaluation with contrast-enhanced MRI.      DX-CHEST-LIMITED (1 VIEW)   Final Result      Interval left basilar pigtail catheter with diminished atelectasis, pleural fluid      Stable right hydropneumothorax with thoracostomy tube in place, considerable soft tissue gas and partial lung collapse      IR-CHEST TUBE-EMPYEMA LEFT   Final Result      1.  CT GUIDED LEFT  10 [place] PIGTAIL CHEST TUBE PLACEMENT FOR POSSIBLE EMPYEMA YIELDING OLD  BLOODY FLUID.      2. A CHEST RADIOGRAPH IS FORTHCOMING.      EC-MICAH W/O CONT   Final Result      US-ABDOMEN LTD (SOFT TISSUE)   Final Result      No fluid seen surrounding the electronic implanted spinal stimulator device.      DX-CHEST-LIMITED (1 VIEW)   Final Result      1.  Large right hydropneumothorax with right-sided chest tube in place, likely stable to slightly decreased from prior study.   2.  Small left pleural effusion. Mild improved aeration in the left lung.   3.  Bilateral pulmonary opacities which could be related to combination of atelectasis, edema and/or infection..      CT-CTA HEAD WITH & W/O-POST PROCESS   Final Result      1.  Patent carotid and vertebral arteries.      2.  Again seen right frontal the jugular peritoneal shunt catheter. There is mild increase in volume of the lateral and third ventricles.      CT-CHEST (THORAX) W/O   Final Result      1.  Interval placement of a right-sided chest tube into a large loculated right-sided pleural effusion. There is now seen a large right-sided hydropneumothorax in the area that previously seen fluid. The chest tube extends into that hydropneumothorax.    There is some residual pleural fluid seen as well. A hydropneumothorax is somewhat loculated with components most prominently inferiorly and medially.      2.  New loculated left pleural effusion.      3.  Again seen multiple bilateral cavitary pulmonary masses which are more prominent than previously seen with increasing cavitation and measure up to 3 cm size. Differential diagnosis includes septic emboli, multifocal abscesses and neoplasm.      4.  Large amount of subcutaneous emphysema on the right.      5.  Splenomegaly.      6.  Multiple support devices.      Fleischner Society pulmonary nodule recommendations:         DX-CHEST-LIMITED (1 VIEW)   Final Result      1.  Support devices as described above.      2.  Right chest tube present with a again seen right-sided pneumothorax, possibly  increased in size and loculated.      3.  Worsening bilateral pulmonary infiltrates.      DX-ABDOMEN FOR TUBE PLACEMENT   Final Result      Cortrak feeding tube tip projects in the region of the duodenal bulb.      DX-CHEST-LIMITED (1 VIEW)   Final Result      Loculated right pneumothorax is decreased in size compared to prior.      Small left pleural effusion.      Small loculated right pleural effusion.      Extensive bilateral airspace opacities are not significantly changed.      Soft tissue air on the right is again noted.      DX-CHEST-LIMITED (1 VIEW)   Final Result      Decreased partially loculated right pleural fluid with increased pleural air. Right chest tube is in place.      Increased hazy opacity in the left lung possibly atelectasis.         US-ABDOMEN LTD (SOFT TISSUE)   Final Result      No drainable fluid collection.      DX-CHEST-PORTABLE (1 VIEW)   Final Result      Decreased partially loculated right pleural fluid with increased pleural air. Right chest tube is in place.      No other significant change.      DX-CHEST-PORTABLE (1 VIEW)   Final Result         1.  Pulmonary edema and/or infiltrates are identified, which are somewhat decreased since the prior exam.   2.  Moderate loculated appearing right pleural effusion, slightly decreased since prior      DX-ABDOMEN FOR TUBE PLACEMENT   Final Result      Feeding tube tip at the distal stomach.      DX-CHEST-PORTABLE (1 VIEW)   Final Result         1.  New chest tube is noted in the right lower hemithorax.      2.  Right pleural effusion is again identified which appears similar to the prior exam.      3.  No new infiltrates or consolidations are identified.      DX-CHEST-PORTABLE (1 VIEW)   Final Result         1.  Pulmonary edema and/or infiltrates are identified, which are stable since the prior exam.   2.  Moderate loculated appearing right pleural effusion      DX-CHEST-PORTABLE (1 VIEW)   Final Result         No significant interval  change.      POCT BUTTERFLY-ECHOCARDIOGRAM LTD W/O CONT    (Results Pending)        Assessment/Plan  * Empyema of right pleural space (HCC)- (present on admission)  Assessment & Plan  Patient s/p multiple chest tubes, thoracotomy and decortication  Now s/p second decortication 5/17      Debility  Assessment & Plan  Acute on chronic, the patient likely need of LTAC treatment post acute treatment    Elevated alkaline phosphatase level- (present on admission)  Assessment & Plan  The patient not able to tolerate a MRCP at this time, ultrasound grossly unremarkable, observe    Agitation requiring sedation protocol- (present on admission)  Assessment & Plan  Monitor,    Goals of care, counseling/discussion  Assessment & Plan  Patient with overall high risk of morbidity and mortality given her gravely ill state and prior chronic medical conditions  Palliative care following    Spinal cord stimulator status  Assessment & Plan  Patient's stimulator is Nuvectra. It is FDA approved as MRI compatible, but the company has gone out of business, so there is no support team to assist with MRI.  Thus, if MRI were performed, our radiologists would need to protocol it correctly to manage the stimulator. The former rep from [employer] is Andre, [phone].  Information obtained from Dr. Mahoney's office, [phone].     Per  (5/4/2021), it is not MRI compatible unfortunately.     Thrombocytosis (HCC)  Assessment & Plan  Acute reaction since resolved    Anasarca- (present on admission)  Assessment & Plan  Improved nutritional status, skin protective measures    Trapped lung  Assessment & Plan  Now s/p decortication   Chest tubes removed on 5/26, observe    Pulmonary abscess (HCC)  Assessment & Plan  From septic emboli  Now s/p decortication    Fever  Assessment & Plan  Resolved, monitor, antibiotic therapy    Atelectasis  Assessment & Plan  Pulmonary recruiting, respiratory care    Pneumonia  Assessment & Plan  MSSA  Ancef per  ID    History of vasculitis- (present on admission)  Assessment & Plan  Does not appear to be a current issue    Bacteremia due to methicillin susceptible Staphylococcus aureus (MSSA)- (present on admission)  Assessment & Plan  Infectious disease assisting with antibiotic treatment  Multiple potential sources  Endocarditis  ID following  Continue Ancef until 6/14    CSF pleocytosis- (present on admission)  Assessment & Plan  Infectious disease managing    Acute bacterial endocarditis- (present on admission)  Assessment & Plan  Mitral valve, tricuspid valve  MSSA  Ongoing antibiotic therapy for acute infection and 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    Acute blood loss anemia  Assessment & Plan  Likely secondary to hemothorax  300 cc of blood removed after chest tube placed at [facility] hemoglobin    Prolonged Q-T interval on ECG- (present on admission)  Assessment & Plan  Resolved after correcting metabolic issues  Cont to follow   Monitor electrolytes, acidosis etc    Acute respiratory failure with hypoxia (HCC)- (present on admission)  Assessment & Plan  Secondary to MSSA bacteremia, empyema, pulmonary septic emboli  S/p tracheostomy  Ongoing respiratory care, pulmonary toilet, antibiotic treatment  Prolonged ventilator dependence  Will need LTAC  cxr showed improvement  Wean oxygen as tolerated  CPT.    History of complicated migraines- (present on admission)  Assessment & Plan  Monitor    GERD (gastroesophageal reflux disease)- (present on admission)  Assessment & Plan  History of, as needed treatment    Hyponatremia  Assessment & Plan  Resolved  Follow daily    Tachycardia  Assessment & Plan  Persistent, likely secondary with multiple underlying ongoing conditions  Monitor  Stable    Pseudotumor cerebri  Assessment & Plan  History of  shunt, neurosurgery opted against removal    H/O: CVA (cerebrovascular accident)- (present on admission)  Assessment & Plan  Monitor    Chronic pain  syndrome- (present on admission)  Assessment & Plan  Pre-existing, pain management    Port or reservoir infection  Assessment & Plan  S/p removal    Thrombocytopenia (HCC)- (present on admission)  Assessment & Plan  Transient, resolved    Anemia- (present on admission)  Assessment & Plan  Monitor, transfuse as indicated    Septic shock (HCC)- (present on admission)  Assessment & Plan  Multiple sources, recovered with aggressive treatment, IV antibiotics long-term  Resolved    Sepsis (HCC)  Assessment & Plan  Multiple sources possible, recovered with medical treatment  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 Oh felt there is currently no need to remove    Acute encephalopathy- (present on admission)  Assessment & Plan  Per neurosurgery no need to remove  shunt  Improving with ongoing medical treatment  Improved           VTE prophylaxis: enoxaparin    Case and plan of care discussed with nurse staff during multidisciplinary rounds.    Patient is has a high medical complexity and is at high risk for complication, morbidity, and mortality.

## 2021-06-01 NOTE — CARE PLAN
Problem: Knowledge Deficit - Standard  Goal: Patient and family/care givers will demonstrate understanding of plan of care, disease process/condition, diagnostic tests and medications  Outcome: Progressing     Problem: Fall Risk  Goal: Patient will remain free from falls  Outcome: Progressing   The patient is Stable - Low risk of patient condition declining or worsening    Shift Goals  Clinical Goals: mobility/ pulmonary improvement   Patient Goals: pain control     Progress made toward(s) clinical / shift goals:  Pt ambulating and increasing in mobility. POC discussed with pt, pt verbalized understanding. All questions answered at this time. Safety precautions in place.

## 2021-06-01 NOTE — PROGRESS NOTES
2 RN skin check completed with CAROLINA shirley.   Devices in place trach, purewick, wound vac.  Skin assessed under devices intact.  Confirmed pressure ulcers found on none.  New potential pressure ulcers noted on none.  The following interventions in place: Pillows in use for support and positioning. Offloading used. Pt turns self from side to side, waffle overlay.     Ears red and blanching.  Trach site/ neck intact. Pink at site, blanching  Elbows pink and blanching.  Back has scattered rash, skin intact.   Sacrum with patchy excoriation, red and blanching.  Right great toes with DTI   Left 3rd toe DTI   Heels and toes pink and blanching.  Generalized bruising to extremeties.  Tracheotomy in place, purewick in use, wound vac to right side chest wall.

## 2021-06-02 LAB
ALBUMIN SERPL BCP-MCNC: 2.9 G/DL (ref 3.2–4.9)
ALBUMIN/GLOB SERPL: 0.7 G/DL
ALP SERPL-CCNC: 705 U/L (ref 30–99)
ALT SERPL-CCNC: 9 U/L (ref 2–50)
ANION GAP SERPL CALC-SCNC: 8 MMOL/L (ref 7–16)
AST SERPL-CCNC: 14 U/L (ref 12–45)
BILIRUB SERPL-MCNC: 0.4 MG/DL (ref 0.1–1.5)
BUN SERPL-MCNC: 6 MG/DL (ref 8–22)
CALCIUM SERPL-MCNC: 9 MG/DL (ref 8.5–10.5)
CHLORIDE SERPL-SCNC: 96 MMOL/L (ref 96–112)
CO2 SERPL-SCNC: 32 MMOL/L (ref 20–33)
CREAT SERPL-MCNC: 0.2 MG/DL (ref 0.5–1.4)
ERYTHROCYTE [DISTWIDTH] IN BLOOD BY AUTOMATED COUNT: 54.2 FL (ref 35.9–50)
GLOBULIN SER CALC-MCNC: 4.3 G/DL (ref 1.9–3.5)
GLUCOSE SERPL-MCNC: 86 MG/DL (ref 65–99)
HCT VFR BLD AUTO: 32.1 % (ref 37–47)
HGB BLD-MCNC: 9.6 G/DL (ref 12–16)
MAGNESIUM SERPL-MCNC: 1.6 MG/DL (ref 1.5–2.5)
MCH RBC QN AUTO: 27.7 PG (ref 27–33)
MCHC RBC AUTO-ENTMCNC: 29.9 G/DL (ref 33.6–35)
MCV RBC AUTO: 92.5 FL (ref 81.4–97.8)
PLATELET # BLD AUTO: 330 K/UL (ref 164–446)
PMV BLD AUTO: 9.8 FL (ref 9–12.9)
POTASSIUM SERPL-SCNC: 4.2 MMOL/L (ref 3.6–5.5)
PROT SERPL-MCNC: 7.2 G/DL (ref 6–8.2)
RBC # BLD AUTO: 3.47 M/UL (ref 4.2–5.4)
SODIUM SERPL-SCNC: 136 MMOL/L (ref 135–145)
WBC # BLD AUTO: 8.3 K/UL (ref 4.8–10.8)

## 2021-06-02 PROCEDURE — 99233 SBSQ HOSP IP/OBS HIGH 50: CPT | Performed by: STUDENT IN AN ORGANIZED HEALTH CARE EDUCATION/TRAINING PROGRAM

## 2021-06-02 PROCEDURE — 700102 HCHG RX REV CODE 250 W/ 637 OVERRIDE(OP): Performed by: HOSPITALIST

## 2021-06-02 PROCEDURE — 94760 N-INVAS EAR/PLS OXIMETRY 1: CPT

## 2021-06-02 PROCEDURE — 80053 COMPREHEN METABOLIC PANEL: CPT

## 2021-06-02 PROCEDURE — 700111 HCHG RX REV CODE 636 W/ 250 OVERRIDE (IP): Performed by: INTERNAL MEDICINE

## 2021-06-02 PROCEDURE — 700102 HCHG RX REV CODE 250 W/ 637 OVERRIDE(OP): Performed by: STUDENT IN AN ORGANIZED HEALTH CARE EDUCATION/TRAINING PROGRAM

## 2021-06-02 PROCEDURE — 94668 MNPJ CHEST WALL SBSQ: CPT

## 2021-06-02 PROCEDURE — 770020 HCHG ROOM/CARE - TELE (206)

## 2021-06-02 PROCEDURE — 94640 AIRWAY INHALATION TREATMENT: CPT

## 2021-06-02 PROCEDURE — 83735 ASSAY OF MAGNESIUM: CPT

## 2021-06-02 PROCEDURE — 97116 GAIT TRAINING THERAPY: CPT | Mod: CQ

## 2021-06-02 PROCEDURE — 97530 THERAPEUTIC ACTIVITIES: CPT | Mod: CQ

## 2021-06-02 PROCEDURE — 85027 COMPLETE CBC AUTOMATED: CPT

## 2021-06-02 PROCEDURE — A9270 NON-COVERED ITEM OR SERVICE: HCPCS | Performed by: STUDENT IN AN ORGANIZED HEALTH CARE EDUCATION/TRAINING PROGRAM

## 2021-06-02 PROCEDURE — A9270 NON-COVERED ITEM OR SERVICE: HCPCS | Performed by: HOSPITALIST

## 2021-06-02 RX ADMIN — RISPERIDONE 2 MG: 2 TABLET ORAL at 05:48

## 2021-06-02 RX ADMIN — DIVALPROEX SODIUM 500 MG: 125 CAPSULE ORAL at 13:51

## 2021-06-02 RX ADMIN — DIVALPROEX SODIUM 500 MG: 125 CAPSULE ORAL at 05:48

## 2021-06-02 RX ADMIN — DIAZEPAM 5 MG: 5 TABLET ORAL at 23:48

## 2021-06-02 RX ADMIN — CEFAZOLIN SODIUM 2 G: 2 INJECTION, SOLUTION INTRAVENOUS at 23:48

## 2021-06-02 RX ADMIN — OXYCODONE 5 MG: 5 TABLET ORAL at 20:00

## 2021-06-02 RX ADMIN — PAROXETINE HYDROCHLORIDE 10 MG: 10 TABLET, FILM COATED ORAL at 05:48

## 2021-06-02 RX ADMIN — CEFAZOLIN SODIUM 2 G: 2 INJECTION, SOLUTION INTRAVENOUS at 13:51

## 2021-06-02 RX ADMIN — OXYCODONE 5 MG: 5 TABLET ORAL at 03:59

## 2021-06-02 RX ADMIN — ENOXAPARIN SODIUM 40 MG: 40 INJECTION SUBCUTANEOUS at 05:48

## 2021-06-02 RX ADMIN — CEFAZOLIN SODIUM 2 G: 2 INJECTION, SOLUTION INTRAVENOUS at 05:48

## 2021-06-02 RX ADMIN — ACETAMINOPHEN 500 MG: 325 TABLET ORAL at 20:04

## 2021-06-02 RX ADMIN — DIVALPROEX SODIUM 500 MG: 125 CAPSULE ORAL at 23:48

## 2021-06-02 RX ADMIN — DIPHENHYDRAMINE HYDROCHLORIDE 50 MG: 25 TABLET ORAL at 13:58

## 2021-06-02 RX ADMIN — GABAPENTIN 300 MG: 300 CAPSULE ORAL at 05:48

## 2021-06-02 RX ADMIN — DULOXETINE HYDROCHLORIDE 60 MG: 60 CAPSULE, DELAYED RELEASE ORAL at 16:43

## 2021-06-02 RX ADMIN — METHADONE HYDROCHLORIDE 30 MG: 10 TABLET ORAL at 05:48

## 2021-06-02 RX ADMIN — RISPERIDONE 2 MG: 2 TABLET ORAL at 16:43

## 2021-06-02 RX ADMIN — DULOXETINE HYDROCHLORIDE 60 MG: 60 CAPSULE, DELAYED RELEASE ORAL at 05:48

## 2021-06-02 RX ADMIN — DOCUSATE SODIUM 50 MG AND SENNOSIDES 8.6 MG 2 TABLET: 8.6; 5 TABLET, FILM COATED ORAL at 05:48

## 2021-06-02 RX ADMIN — ONDANSETRON 4 MG: 2 INJECTION INTRAMUSCULAR; INTRAVENOUS at 07:03

## 2021-06-02 RX ADMIN — ACETAMINOPHEN 500 MG: 325 TABLET ORAL at 13:51

## 2021-06-02 RX ADMIN — GABAPENTIN 300 MG: 300 CAPSULE ORAL at 13:51

## 2021-06-02 RX ADMIN — GABAPENTIN 300 MG: 300 CAPSULE ORAL at 23:48

## 2021-06-02 RX ADMIN — OXYCODONE 5 MG: 5 TABLET ORAL at 13:58

## 2021-06-02 RX ADMIN — DOCUSATE SODIUM 50 MG AND SENNOSIDES 8.6 MG 2 TABLET: 8.6; 5 TABLET, FILM COATED ORAL at 16:43

## 2021-06-02 ASSESSMENT — COGNITIVE AND FUNCTIONAL STATUS - GENERAL
MOBILITY SCORE: 18
SUGGESTED CMS G CODE MODIFIER MOBILITY: CK
WALKING IN HOSPITAL ROOM: A LITTLE
STANDING UP FROM CHAIR USING ARMS: A LITTLE
TURNING FROM BACK TO SIDE WHILE IN FLAT BAD: A LITTLE
MOVING FROM LYING ON BACK TO SITTING ON SIDE OF FLAT BED: A LITTLE
CLIMB 3 TO 5 STEPS WITH RAILING: A LITTLE
MOVING TO AND FROM BED TO CHAIR: A LITTLE

## 2021-06-02 ASSESSMENT — ENCOUNTER SYMPTOMS
HEADACHES: 0
MYALGIAS: 0
FEVER: 0
WHEEZING: 0
SPUTUM PRODUCTION: 0
NAUSEA: 1
DIARRHEA: 0
ABDOMINAL PAIN: 0
COUGH: 1
CHILLS: 0
SHORTNESS OF BREATH: 0

## 2021-06-02 ASSESSMENT — GAIT ASSESSMENTS
GAIT LEVEL OF ASSIST: SUPERVISED
DISTANCE (FEET): 275
ASSISTIVE DEVICE: FRONT WHEEL WALKER

## 2021-06-02 ASSESSMENT — FIBROSIS 4 INDEX: FIB4 SCORE: 0.68

## 2021-06-02 NOTE — PROGRESS NOTES
Hospital Medicine Daily Progress Note    Date of Service  6/2/2021    Chief Complaint  50 y.o. female admitted 4/16/2021 with recurrent infections    Hospital Course   This is a 50-year-old female with a past medical history of pseudotumor cerebri s/p  shunt implantation by Dr. 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  [facility] infectious disease is managing antibiotics for the patient.  Dr. Candelaria surgery removed the port on 4/12.  The patient was further identified to have endocarditis of the tricuspid valve, a lumbar puncture performed on 4/14 showed pleocytosis with negative Gram stain.  The patient suffered worsening leukocytosis and was found to have septic emboli per CT.  Initially a small pleural catheter was placed.  Significant loculations were encountered.  MSSA empyema was diagnosed.  Neurosurgery was consulted to comment on possibly removal of a  shunt, this was felt not appropriate at this time.  The patient remained on mechanical ventilation for 15 days, then a perc trach was placed.  The patient was eventually liberated from mechanical ventilation, the patient did have a right thoracoscopy and decortication of the lung performed on 4/28 by Dr. Ganser.  The patient was of identified to have multiple sources for MSSA including mitral valve, tricuspid valve vegetation, port, spinal stimulator,  shunt possibly.  Patient underwent repeat chest surgery, decortication with 2 chest tube placement on 5/18, had to be placed back on mechanical ventilation after surgery, central access was replaced on 5/19, the patient treated  in ICU with eventual chest tube removal with assistance of thoracic surgery and was deescalated in terms of respiratory support down to trach collar.  [place] in need of extensive rehabilitation, plan for LTAC level of care on discharge.    Interval Problem Update  Patient seen and examined today.    Patient tolerating treatment and therapies.  All Data, Medication data reviewed.  Case discussed with nursing as available.  Plan of Care reviewed with patient and notified of changes.  5/26 the patient on T-piece, 30% FiO2, no increase in work of breathing, small amount of secretions, good cough, chest x-ray improving, chest surgeon removed both chest tubes, ongoing antibiotic treatment with assistance of [facility] infectious disease.  Transfer options being evaluated.  Afebrile, tachycardic with heart rate in the 1 teens, blood pressure is soft but maintaining MAP.  Laboratory data reviewed, electrolytes are balanced, no leukocytosis, improving hemoglobin, chest x-ray with stable lines and tubes, stable ill-defined bilateral pulmonary opacities, no large effusions  5/27 the patient appears better, she is ambulating, the patient was cleared for diet, the patient is awake alert and following, afebrile, blood pressure between 90 and 130, no increase in respiratory difficulty, afebrile, ongoing antibiotic treatment through 6/14  Ongoing rehab efforts, awaiting LTAC approval    5/28 patient is in bed, core track removed, patient is tolerating diet, family is at bedside, discussed with , LTAC pending, patient tachycardic any symptoms no fever, blood pressure is soft but stable.  5/29 patient in bed, no fever or chills. Tolerating diet, vs stable. On T piece o2 requirement stable.   5/30 in bed looks comfortable, not in pain, o2 requirement increasing I will ordered cxr, discussed with RT today, continue iv abx per ID.   5/31 in bed feeling ok, o2 requirements decreased to 5L, no fever or chills. Discussed with nurse  staff.   6/1 Patient reports breathing has improved. Still with some pain at thoracotomy site, otherwise appears comfortable. Continue Abx until 6/14 per ID, will need long term Abx suppression with keflex following completion of Abx.  6/2 No acute events overnight. Remains on oxygen with T piece. Reports breathing better than yesterday. Remains on Abx. Pending LTAC approval    Consultants/Specialty  ID  Thoracic surgery  Pulmonary critical care    Code Status  Full Code    Disposition  Rehab, hopefully LTAC    Review of Systems  Review of Systems   Unable to perform ROS: Other   Poor communication secondary to tracheostomy    Physical Exam  Temp:  [35.8 °C (96.5 °F)-36.6 °C (97.9 °F)] 35.8 °C (96.5 °F)  Pulse:  [] 105  Resp:  [16-18] 18  BP: ()/(51-78) 93/61  SpO2:  [91 %-98 %] 94 %    Physical Exam  Vitals and nursing note reviewed.   Constitutional:       General: She is not in acute distress.     Appearance: She is well-developed. She is ill-appearing. She is not diaphoretic.   HENT:      Head: Normocephalic and atraumatic.   Eyes:      General: No scleral icterus.  Neck:      Vascular: No JVD.   Cardiovascular:      Rate and Rhythm: Regular rhythm. Tachycardia present.   Pulmonary:      Effort: Pulmonary effort is normal. No respiratory distress.      Breath sounds: No stridor. Rales present.      Comments: Trach without inflammation  Abdominal:      General: There is no distension.      Palpations: Abdomen is soft.      Tenderness: There is no abdominal tenderness.   Skin:     General: Skin is warm and dry.      Coloration: Skin is pale.      Findings: No rash.   Neurological:      Mental Status: She is alert and oriented to person, place, and time.      Motor: Weakness present.   Psychiatric:         Thought Content: Thought content normal.         Fluids    Intake/Output Summary (Last 24 hours) at 6/2/2021 1700  Last data filed at 6/2/2021 0418  Gross per 24 hour   Intake 120 ml   Output 1160 ml    Net -1040 ml       Laboratory  Recent Labs     05/31/21  0652 06/02/21  0311   WBC 6.0 8.3   RBC 3.33* 3.47*   HEMOGLOBIN 9.5* 9.6*   HEMATOCRIT 31.0* 32.1*   MCV 93.1 92.5   MCH 28.5 27.7   MCHC 30.6* 29.9*   RDW 54.9* 54.2*   PLATELETCT 350 330   MPV 9.6 9.8     Recent Labs     05/31/21  0652 06/02/21  0311   SODIUM 133* 136   POTASSIUM 3.8 4.2   CHLORIDE 95* 96   CO2 29 32   GLUCOSE 154* 86   BUN 7* 6*   CREATININE 0.51 0.20*   CALCIUM 9.0 9.0                   Imaging  DX-CHEST-PORTABLE (1 VIEW)   Final Result      1.  Interval removal of the right-sided chest tubes. No pneumothorax is identified.   2.  Airspace opacities in the right are mildly improved and may represent atelectasis/consolidation.   3.  There is likely a small right pleural effusion.   4.  Patchy opacities on the left are mildly improved.   5.  Interval removal of the enteric tube.      DX-CHEST-PORTABLE (1 VIEW)   Final Result         1. Stable lines and tubes.   2. Stable ill-defined bilateral pulmonary opacities, right worse than left. No large pleural effusions.         DX-CHEST-PORTABLE (1 VIEW)   Final Result      1.  Decrease in volume of right pleural fluid collection/empyema with 2 right chest tubes in place. No pneumothorax identified.      2.  No focal consolidation in the left lung.      DX-CHEST-PORTABLE (1 VIEW)   Final Result      No significant interval change.      DX-CHEST-PORTABLE (1 VIEW)   Final Result         1.  Hazy right pulmonary infiltrates and/or effusion, stable compared to prior study.      DX-CHEST-PORTABLE (1 VIEW)   Final Result         1.  Hazy right pulmonary infiltrates and/or effusion, stable compared to prior study.      DX-CHEST-PORTABLE (1 VIEW)   Final Result      1.  All lines and tubes appear appropriately located      2.  Consolidation throughout the right lung consistent with pneumonia      DX-CHEST-PORTABLE (1 VIEW)   Final Result         1.  Hazy right pulmonary infiltrates and/or effusion,  increased compared to prior study.      DX-ABDOMEN FOR TUBE PLACEMENT   Final Result      Feeding tube looped in the stomach.      DX-CHEST-PORTABLE (1 VIEW)   Final Result         1. Stable lines and tubes. No pneumothorax detected.   2. Persistent opacities in the right perihilar region and in the right lung periphery, similar to prior study. Left lung is essentially clear.      DX-CHEST-PORTABLE (1 VIEW)   Final Result      Postoperative changes of the right chest with placement of an additional chest tube. There is improved aeration in the right lung. No significant pleural effusion or definite pneumothorax.      Right IJ central venous catheter tip projects over the proximal right atrium.      Hypoinflation with interstitial and hazy pulmonary opacities.      DX-CHEST-PORTABLE (1 VIEW)   Final Result         1.  Hazy right pulmonary infiltrates and/or effusion, similar compared to prior study.      DX-ABDOMEN FOR TUBE PLACEMENT   Final Result      Feeding tube tip projects over the expected location the gastric antrum.      DX-CHEST-PORTABLE (1 VIEW)   Final Result         1.  Hazy right pulmonary infiltrates and/or effusion, similar compared to prior study.      DX-CHEST-LIMITED (1 VIEW)   Final Result      1.  No pneumothorax. Only one chest tube is now seen in the right lung.   2.  The chest is otherwise stable.      DX-CHEST-PORTABLE (1 VIEW)   Final Result         1.  Hazy right pulmonary infiltrates and/or effusion, similar compared to prior study.      DX-CHEST-PORTABLE (1 VIEW)   Final Result         1.  Hazy right pulmonary infiltrates and/or effusion, similar compared to prior study.      DX-ABDOMEN FOR TUBE PLACEMENT   Final Result         1.  Nonspecific bowel gas pattern.   2.  Dobbhoff tube tip terminates overlying the expected location of the gastric antrum.   3.  Hazy bilateral lung base infiltrates, greater on the right.      DX-CHEST-PORTABLE (1 VIEW)   Final Result         1.  Hazy right  pulmonary infiltrates and/or effusion, similar compared to prior study.      US-RUQ   Final Result         1.  Hepatomegaly   2.  Mild intrahepatic and extrahepatic biliary ductal dilatation, commonly associated with postcholecystectomy physiology, consider causes of biliary obstruction with additional workup as clinically appropriate      CT-CHEST,ABDOMEN,PELVIS WITH   Final Result      1.  Large empyema in the RIGHT hemithorax, slightly decreased in size from prior exam with chest tubes present.   2.  Consolidation remains.   3.  Multiple cavitary lesions again seen in the LEFT upper lobe, minimally decreased from prior, concerning for septic emboli.   4.  Supportive tubing is unchanged.   5.  Intrahepatic and extrahepatic biliary dilation, likely postoperative although distal stricture, stone or mass remain possibilities.   6.  Moderate pelvic fluid, nonspecific.   7.  No evidence of bowel obstruction or perforation.      DX-CHEST-PORTABLE (1 VIEW)   Final Result         1.  Hazy right pulmonary infiltrates and/or effusion, similar compared to prior study.      DX-CHEST-PORTABLE (1 VIEW)   Final Result         No significant interval change.            DX-CHEST-PORTABLE (1 VIEW)   Final Result         Interval removal of left central venous catheter. Interval adjustment of right PICC with tip in the SVC.         DX-ABDOMEN FOR TUBE PLACEMENT   Final Result      Feeding tube tip at the mid to distal stomach.      IR-PICC LINE PLACEMENT W/ GUIDANCE > AGE 5   Final Result                  Ultrasound-guided PICC placement performed by qualified nursing staff as    above.          DX-CHEST-PORTABLE (1 VIEW)   Final Result      1.  Stable cardiopulmonary findings.   2.  See comments above regarding the right-sided PICC position.      DX-CHEST-PORTABLE (1 VIEW)   Final Result      Stable chest findings compared to 5/5.      DX-CHEST-PORTABLE (1 VIEW)   Final Result      Stable chest findings compared with 5/3.       DX-ABDOMEN FOR TUBE PLACEMENT   Final Result         Feeding tube with tip projecting over the expected area of the stomach.      DX-CHEST-PORTABLE (1 VIEW)   Final Result      Stable chest findings compared with prior.      DX-CHEST-PORTABLE (1 VIEW)   Final Result         1. No significant interval change.      US-EXTREMITY ARTERY LOWER UNILAT RIGHT   Final Result      DX-CHEST-PORTABLE (1 VIEW)   Final Result         1. No significant interval change.      IR-PICC LINE PLACEMENT W/ GUIDANCE > AGE 5   Final Result                  Ultrasound-guided PICC placement performed by qualified nursing staff as    above.          DX-CHEST-PORTABLE (1 VIEW)   Final Result         1.  Pulmonary edema and/or infiltrates, similar to prior study.   2.  Stable opacification right lung, likely effusion. Thoracostomy tubes are in place.   3.  Multiple cavitary appearing left pulmonary lesions, see CT performed April 27, 2021 for further characterization      DX-CHEST-PORTABLE (1 VIEW)   Final Result         1.  Pulmonary edema and/or infiltrates, similar to prior study.   2.  Single opacification right lung, likely effusion. Thoracostomy tubes are in place.   3.  Multiple cavitary appearing left pulmonary lesions, see CT performed April 27, 2021 for further characterization   4.  Soft tissue gas in the right chest wall      DX-CHEST-PORTABLE (1 VIEW)   Final Result         1.  Pulmonary edema and/or infiltrates, similar to prior study.   2.  Increased opacification right lung, likely increased effusion. Thoracostomy tubes are in place.   3.  Multiple cavitary appearing left pulmonary lesions, see CT performed April 27, 2021 for further characterization   4.  Soft tissue gas in the right chest wall      DX-CHEST-PORTABLE (1 VIEW)   Final Result         1.  Pulmonary edema and/or infiltrates, similar to prior study.   2.  Right pneumothorax, stable compared to prior study, right thoracostomy tubes remain in place   3.  Multiple  cavitary appearing left pulmonary lesions, see CT performed April 27, 2021 for further characterization   4.  Soft tissue gas in the right chest wall      DX-CHEST-PORTABLE (1 VIEW)   Final Result         1.  Pulmonary edema and/or infiltrates, similar to prior study.   2.  Right pneumothorax, interval decreased compared to prior study, interval placement of right thoracostomy tubes is noted.   3.  Multiple cavitary appearing left pulmonary lesions, see CT performed yesterday for further characterization   4.  Soft tissue gas in the right chest wall      DX-CHEST-PORTABLE (1 VIEW)   Final Result         1.  Pulmonary edema and/or infiltrates, similar to prior study.   2.  Right pneumothorax, stable compared to prior study, interval removal of right thoracostomy tube is noted.   3.  Multiple cavitary appearing left pulmonary lesions, see CT performed yesterday for further characterization   4.  Soft tissue gas in the right chest wall      DX-CHEST-LIMITED (1 VIEW)   Final Result         No significant interval change.      CT-CTA CHEST PULMONARY ARTERY W/ RECONS   Final Result         No CT evidence of pulmonary embolus.      Previous right chest tube were removed.      Grossly similar in size of the loculated right hydropneumothorax but there is increased layering fluid component. Unchanged mild shift of the mediastinal to the left.      Redemonstration of a pigtail catheter in the medial left lung base. Grossly similar multiple cavitary lesions in the left lung.      US-EXTREMITY VENOUS LOWER BILAT   Final Result      POCT BUTTERFLY-DUPLEX SCAN EXTREMITY VEINS LIMITED   Final Result      DX-CHEST-PORTABLE (1 VIEW)   Final Result         1.  Pulmonary edema and/or infiltrates, similar to prior study.   2.  Right pneumothorax, somewhat decreased to prior study, interval removal of right thoracostomy tube is noted.   3.  Multiple cavitary appearing left pulmonary lesions, see CT performed yesterday for further  characterization   4.  Soft tissue gas in the right chest wall      DX-CHEST-PORTABLE (1 VIEW)   Final Result         1.  Pulmonary edema and/or infiltrates, similar to prior study.   2.  Right pneumothorax, similar to prior study with thoracostomy tube in place.   3.  Multiple cavitary appearing left pulmonary lesions, see CT performed yesterday for further characterization      CT-CHEST (THORAX) W/O   Final Result      Large loculated right hydropneumothorax with interval increase in size of the pneumothorax and pleural fluid.      Right chest tube is in the posterior right thorax.      There is mediastinal shift to the left compatible with tension.      Small pericardial effusion.      Small loculated left pleural effusion with overlying atelectasis/consolidation.   Pigtail catheter is seen at the medial left lung base. Pleural effusion has decreased in size compared to prior.      There are multiple foci of air extending from the right lung to the pleural space suspicious for a bronchopleural fistula.      Multiple cavitary lesions bilaterally with mild interval decrease of the solid component of the cavitary nodules.      Noncavitary 3.1 cm masslike density is seen at the left lung base.      Findings may be related to septic emboli, malignancy or multifocal abscesses. Short interval follow-up recommended.      Soft tissue air on the right is again seen.      Splenomegaly      Findings discussed with Leti Sun.      DX-CHEST-PORTABLE (1 VIEW)   Final Result      No significant change from prior exam.      CT-HEAD W/O   Final Result      1.  RIGHT frontal ventriculostomy catheter unchanged in position.   2.  Mild cortical atrophy.   3.  No intracranial hemorrhage.   4.  Apparent dural thickening overlying the clivus.  Consider further evaluation with contrast-enhanced MRI.      DX-CHEST-LIMITED (1 VIEW)   Final Result      Interval left basilar pigtail catheter with diminished atelectasis, pleural fluid       Stable right hydropneumothorax with thoracostomy tube in place, considerable soft tissue gas and partial lung collapse      IR-CHEST TUBE-EMPYEMA LEFT   Final Result      1.  CT GUIDED LEFT  10 Japanese PIGTAIL CHEST TUBE PLACEMENT FOR POSSIBLE EMPYEMA YIELDING OLD BLOODY FLUID.      2. A CHEST RADIOGRAPH IS FORTHCOMING.      EC-MICAH W/O CONT   Final Result      US-ABDOMEN LTD (SOFT TISSUE)   Final Result      No fluid seen surrounding the electronic implanted spinal stimulator device.      DX-CHEST-LIMITED (1 VIEW)   Final Result      1.  Large right hydropneumothorax with right-sided chest tube in place, likely stable to slightly decreased from prior study.   2.  Small left pleural effusion. Mild improved aeration in the left lung.   3.  Bilateral pulmonary opacities which could be related to combination of atelectasis, edema and/or infection..      CT-CTA HEAD WITH & W/O-POST PROCESS   Final Result      1.  Patent carotid and vertebral arteries.      2.  Again seen right frontal the jugular peritoneal shunt catheter. There is mild increase in volume of the lateral and third ventricles.      CT-CHEST (THORAX) W/O   Final Result      1.  Interval placement of a right-sided chest tube into a large loculated right-sided pleural effusion. There is now seen a large right-sided hydropneumothorax in the area that previously seen fluid. The chest tube extends into that hydropneumothorax.    There is some residual pleural fluid seen as well. A hydropneumothorax is somewhat loculated with components most prominently inferiorly and medially.      2.  New loculated left pleural effusion.      3.  Again seen multiple bilateral cavitary pulmonary masses which are more prominent than previously seen with increasing cavitation and measure up to 3 cm size. Differential diagnosis includes septic emboli, multifocal abscesses and neoplasm.      4.  Large amount of subcutaneous emphysema on the right.      5.  Splenomegaly.      6.   Multiple support devices.      Fleischner Society pulmonary nodule recommendations:         DX-CHEST-LIMITED (1 VIEW)   Final Result      1.  Support devices as described above.      2.  Right chest tube present with a again seen right-sided pneumothorax, possibly increased in size and loculated.      3.  Worsening bilateral pulmonary infiltrates.      DX-ABDOMEN FOR TUBE PLACEMENT   Final Result      Cortrak feeding tube tip projects in the region of the duodenal bulb.      DX-CHEST-LIMITED (1 VIEW)   Final Result      Loculated right pneumothorax is decreased in size compared to prior.      Small left pleural effusion.      Small loculated right pleural effusion.      Extensive bilateral airspace opacities are not significantly changed.      Soft tissue air on the right is again noted.      DX-CHEST-LIMITED (1 VIEW)   Final Result      Decreased partially loculated right pleural fluid with increased pleural air. Right chest tube is in place.      Increased hazy opacity in the left lung possibly atelectasis.         US-ABDOMEN LTD (SOFT TISSUE)   Final Result      No drainable fluid collection.      DX-CHEST-PORTABLE (1 VIEW)   Final Result      Decreased partially loculated right pleural fluid with increased pleural air. Right chest tube is in place.      No other significant change.      DX-CHEST-PORTABLE (1 VIEW)   Final Result         1.  Pulmonary edema and/or infiltrates are identified, which are somewhat decreased since the prior exam.   2.  Moderate loculated appearing right pleural effusion, slightly decreased since prior      DX-ABDOMEN FOR TUBE PLACEMENT   Final Result      Feeding tube tip at the distal stomach.      DX-CHEST-PORTABLE (1 VIEW)   Final Result         1.  New chest tube is noted in the right lower hemithorax.      2.  Right pleural effusion is again identified which appears similar to the prior exam.      3.  No new infiltrates or consolidations are identified.      DX-CHEST-PORTABLE (1  VIEW)   Final Result         1.  Pulmonary edema and/or infiltrates are identified, which are stable since the prior exam.   2.  Moderate loculated appearing right pleural effusion      DX-CHEST-PORTABLE (1 VIEW)   Final Result         No significant interval change.      POCT BUTTERFLY-ECHOCARDIOGRAM LTD W/O CONT    (Results Pending)        Assessment/Plan  * Empyema of right pleural space (HCC)- (present on admission)  Assessment & Plan  Patient s/p multiple chest tubes, thoracotomy and decortication  Now s/p second decortication 5/17      Debility  Assessment & Plan  Acute on chronic, the patient likely need of LTAC treatment post acute treatment    Elevated alkaline phosphatase level- (present on admission)  Assessment & Plan  The patient not able to tolerate a MRCP at this time, ultrasound grossly unremarkable, observe    Agitation requiring sedation protocol- (present on admission)  Assessment & Plan  Monitor,    Goals of care, counseling/discussion  Assessment & Plan  Patient with overall high risk of morbidity and mortality given her gravely ill state and prior chronic medical conditions  Palliative care following    Spinal cord stimulator status  Assessment & Plan  Patient's stimulator is Nuvectra. It is FDA approved as MRI compatible, but the company has gone out of business, so there is no support team to assist with MRI.  Thus, if MRI were performed, our radiologists would need to protocol it correctly to manage the stimulator. The former rep from [employer] is Andre, [phone].  Information obtained from Dr. Mahoney's office, [phone].     Per  (5/4/2021), it is not MRI compatible unfortunately.     Thrombocytosis (HCC)  Assessment & Plan  Acute reaction since resolved    Anasarca- (present on admission)  Assessment & Plan  Improved nutritional status, skin protective measures    Trapped lung  Assessment & Plan  Now s/p decortication   Chest tubes removed on 5/26, observe    Pulmonary abscess  (HCC)  Assessment & Plan  From septic emboli  Now s/p decortication    Fever  Assessment & Plan  Resolved, monitor, antibiotic therapy    Atelectasis  Assessment & Plan  Pulmonary recruiting, respiratory care    Pneumonia  Assessment & Plan  MSSA  Ancef per ID    History of vasculitis- (present on admission)  Assessment & Plan  Does not appear to be a current issue    Bacteremia due to methicillin susceptible Staphylococcus aureus (MSSA)- (present on admission)  Assessment & Plan  Infectious disease assisting with antibiotic treatment  Multiple potential sources  Endocarditis  ID following  Continue Ancef until 6/14    CSF pleocytosis- (present on admission)  Assessment & Plan  Infectious disease managing    Acute bacterial endocarditis- (present on admission)  Assessment & Plan  Mitral valve, tricuspid valve  MSSA  Ongoing antibiotic therapy for acute infection and 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    Acute blood loss anemia  Assessment & Plan  Likely secondary to hemothorax  300 cc of blood removed after chest tube placed at [facility]  Trend hemoglobin    Prolonged Q-T interval on ECG- (present on admission)  Assessment & Plan  Resolved after correcting metabolic issues  Cont to follow   Monitor electrolytes, acidosis etc    Acute respiratory failure with hypoxia (HCC)- (present on admission)  Assessment & Plan  Secondary to MSSA bacteremia, empyema, pulmonary septic emboli  S/p tracheostomy  Ongoing respiratory care, pulmonary toilet, antibiotic treatment  Prolonged ventilator dependence  Will need LTAC  cxr showed improvement  Wean oxygen as tolerated  CPT.    History of complicated migraines- (present on admission)  Assessment & Plan  Monitor    GERD (gastroesophageal reflux disease)- (present on admission)  Assessment & Plan  History of, as needed treatment    Hyponatremia  Assessment & Plan  Resolved  Follow daily    Tachycardia  Assessment & Plan  Persistent, likely secondary with  multiple underlying ongoing conditions  Monitor  Stable    Pseudotumor cerebri  Assessment & Plan  History of  shunt, neurosurgery opted against removal    H/O: CVA (cerebrovascular accident)- (present on admission)  Assessment & Plan  Monitor    Chronic pain syndrome- (present on admission)  Assessment & Plan  Pre-existing, pain management    Port or reservoir infection  Assessment & Plan  S/p removal    Thrombocytopenia (HCC)- (present on admission)  Assessment & Plan  Transient, resolved    Anemia- (present on admission)  Assessment & Plan  Monitor, transfuse as indicated    Septic shock (HCC)- (present on admission)  Assessment & Plan  Multiple sources, recovered with aggressive treatment, IV antibiotics long-term  Resolved    Sepsis (HCC)  Assessment & Plan  Multiple sources possible, recovered with medical treatment  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 Oh felt there is currently no need to remove    Acute encephalopathy- (present on admission)  Assessment & Plan  Per neurosurgery no need to remove  shunt  Improving with ongoing medical treatment  Improved           VTE prophylaxis: enoxaparin    Case and plan of care discussed with nurse staff during multidisciplinary rounds.    Patient is has a high medical complexity and is at high risk for complication, morbidity, and mortality.

## 2021-06-02 NOTE — DISCHARGE PLANNING
Anticipated Discharge Disposition: LANE    Action: THANH received a call from Karla ([phone]) with [employer] requesting clinicals for authorization for LTAC. CAROLINAW faxed 3 days worth of progress notes, infectious disease notes, and SLP notes to Karla at fax [phone].    Barriers to Discharge: insurance authorization    Plan: CAROLINAW to f/u with Karla and confirm she received requested documents    1545: THANH faxed additional information to Karla per request.

## 2021-06-02 NOTE — PROGRESS NOTES
Infectious Disease Daily Progress Note    Date of Service  6/2/2021    Chief Complaint  Cefazolin 5/26-present.     Meropenem 5/19-5/26   Cefepime 4/23 5/18-  s/p 23 days  Cefazolin 5/18-5/19     Thoracoscopy & Decortication - 4/28  Decortication 5/17     PRIOR Rx:   Zosyn 4/22-4/23  Previous: Unasyn, Zosyn, Vanco, Daptomycin  Ceftaroline: start 4/13-4/15  Nafcillin 2g Q4 hrs 4/15-4/23 4/14: Unable to give name of previous NSG or neurologist. Seems confused, but able to say some sentences fluently.   4/15: Line cultures now growing MSSA. As well as from the blood. Repeat blood culture 4/13+ in both sets. Patient has worsening confusion. CSF fluid analyses suggest pleocytosis. Cultures are no growth from the CSF. Chest CT was ordered this morning indicating a loculated right pleural effusion as well as bilateral septic emboli. Dr. Mack spoke with Dr. Oh yesterday and no surgical intervention unless clinical deterioration.  4/16: Increased respiratory distress.  Tachypneic.  CT with loculated collection.  Dr Resendiz not available.  No thoracic surgeon available.  Plans to have pulmonary place CT.  Transferring to ICU.  4/17: Transferred to [facility] last night. Hgb dropped to 6.4 requiring PRBC transfusion. Requiring 2 pressors. Fio2 50%. Febrile 38.8. Pending OR decortication of empyema and hemothorax. Chest tube placed at [facility] shows 8,371 WBC, ,000, 74% N  4/18: Chest tube was upsized by surgery yesterday, no decortication. WBC 22k. Fio2 40%. Vasopressin. Levophed down to 2mcg. Afebrile 24 hrs. Last fever 38.6 on 4/16.   4/19: Still on vent. Levo 3 mcg, vasopressin. Afebrile 72 hours. WBC 21k. BC 4/17 NGTD x 48 hours. Unable to do MRI brain given neurostimulator per RN.   4/20: Remaining afebrile. Hb 6.2 and undergoing transfusion today.WBC 24,100, 5% bands.1700 ml CT output. Copious bloody ET secretions. No pressors. Receiving dornase and TPA per CT. Right pleural effusion not large per CXR  today.   4/21: WBC 31k. Bronchoscopy yesterday showing blood. Cultures sent. TPA on hold per RN due to arvind blood from chest tube requiring PRBC transfusion for hgb 6. Pending chest CT today. Per , spinal stimulator is non-MRI compatible. Levophed 10mcg. 30% Fio2. Afebrile.   4/22: Fever 101.3 this am. WBC 20,300 down from 32,200 yesterday. BAL growing Klebsiella pneumoniae but susceptibility panel not set up until today. CTA of brain shows no lesion. CT of chest shows enlarging cavitary lung lesions bilat and large loculated new left pleural effusion and large hydropneumothorax on right. TPA & dornase infusions of right chest ended 4/20 after considerable bleeding requiring transfusion. Hb now down again to 6.8.  4/23: WBC 23k. Fio2 40%. Tmax 38.1. US of stiumlator shows small fluid collection but no drainable abscess. Pending MICAH today. Pending L chest tube placement  4/24: Continue fever 100.8-101.8. WBC 16,600. MICAH shows large vegetations on both tricuspid valve and mitral valve with mild TR & MR.  No aortic root abscess. Left chest tube placed yesterday yielding 8 ml of old bloody fluid. Bronch today revealed copious thick maroon secretions in distal trachea and both mainstem bronchi. On the right these were obstructive. Remaining off pressors. Last positive blood cultures (MSSA) were 4/16, negative 4/17.  4/25: Fever 100.6 this AM. wBC 13,300. Hb 6.6. CT: right hydropneumothorax increasing, right bronchopleural fistula, mediastinal shift ot the left , multiple cavitary pulmonary nodules, 3.1 cm left basilar mass, splenomegaly. CT of head shows dural thickening over the clivus. Lac+ GNR >100,000 col/ml growing from BAL of 4/24, presumed Klebsiella. Left peural fluid culture negative from 4/23.  4/26: Fever 100.4 last night. WBC 13,500. Hb 7.4. Plts 502,000. ESR >120. UA fairly clear except 30 mg% protein, 2-5 wbc and rare rbc. CXR unchanged except more prominent pulmonary edema. GNR in BAL may be a  different species of Klebsiella, and resistant to ampicillin & tmp-sulfa; confirmation pending.  4/28: Fever 100.8 last night. WBC 27,700. Hb 6.6. Plts 482,000. Creat 0.19. Kleb pneum in BAL on 4/24 is resistant to ampicillin & tmp-sulfa but otherwise sensitive. O2 sat 96% on FIO2 30% now, PEEP 8. Has been re-evaluated by thoracic surgeon, Dr. Ganser, who believes she will not wean without decortication on the right. Surgery anticipated today.  4/29: S/P right thoracoscopy with decortication with cultures NG at 24 hrs.  4/30: Had a bronch due to hypoxia and hypotension. CXR with worsening right hemithorax pulmonary opacities. Bloody mucous plugs removed. Pressors started. Febrile this am. Tachy in 130s. Pressors just removed. Tolerating vent, but not a SBT candidate at the moment.  5/1: Small air leak CT-2 every ~ 2/3 breathes. The K. pneumoniae from her thoracoscopy is sensitive to her cefepime so no antibiotic change needed.   5/2: No air leak today she tolerated 2  hrs of spontaneous breathing with support today.      5/3: Second day with SBT and doing well now for 2 hrs. Slight bump in temperature and      WBC's but no obvious clinical infectious changed so watch closely.      5/4: Fever still from time to time. WBCs trending up. Chest tubes in place. Trach.       5/5: She clearly is better today she was sitting up in bed with open eyes and follows commands. She still has low grade fever but her WBC's are dropping. Cefepime dose increased yesterday for increased CNS coverage if necessary. She will probably need further thoracic surgery as mentioned by Dr. Ganser. The issue of what to do with her stimulator is still up in the air for now as it probably will need to be removed but she is nort a candidate for more major surgery at this time.       5/7: She continues to improve and under go longer SBT trials. She just had a new PICC placed in her right arm and plan is to D/C central line.        5/8: PICC placed. No  fevers. Comfortable. Cortrak placed. Failing SBTs.  5/9: No acute issues. No fevers. Comfortable. Family at bedside.   5/10: No acute issues, fevers or WBC bumps. No changes in condition. She is to get her CT scan today and be reviewed by surgery  5/11:  at bedside.  Has been referred to LANE.  Repeat CT scan completed.  Results noted.  ? If Dr Ganser has seen.  Still with an empyema:  ? If candidate for further surgery.  5/12: Pending next thoracic surgery. No fever. Anxious.  5/13: ? Surgery date.  No one seems to know.  Had speaking valve in and having swallow eval with speech therapy  5/14: No acute issues. No fevers. Surgery Monday.  5/15: One CT accidentally pulled out yesterday.  Has been on T piece and tolerating. Plans for surgery Monday afternoon.  5/16: On schedule for surgery 5/17: 4:30 pm.  Moved to room T614.  No new issues.  5/17: Was sitting up in the bedside chair since change of shift.  Now walking in hallways with RN and CNA.  5/18: Decortication yesterday with 2 chest tube placement. WBC improving 21->14k  5/19: Hypotension and tachycardia.  Decreased H/H.  Placed back on vent to rest.  Dr Lozano in process of placing new line.  Antibiotics were deesculated yesterday to Cefazolin.  No cx were taken at surgery.  5/20: Pt was febrile yest afternoon 38.2, but now afebrile overnight after meropenem. WBC improved 11k->8k. Fio2 30%. Phenylephrine now off this morning. CXR shows new R consolidations.   5/21: Off pressors.  On vent: FiO2 30%, PEEP 8, PS 5 Received pRBCs yesterday.  5/22: Remains afebrile, off pressors. FiO2 30%.   5/23: Afebrile. FiO2 30%. Bronchoscopy yesterday normal.   5/24: No new cultures obtained at last bronchoscopy.  On  T piece this am: FiO2 30%.  5/25: Remaining afebrile. WBC 8600.   5/26: Chest tube removed today. Possible transfer to [facility] [place].  5/27: O2 sat usuallyl 95% but intermittent desat to 87-89%.  Remaining afebrile.WBC 6100. Alk phos 641 but normal transaminases.    5/28: Pending [facility] transfer. NO new issues overnight  5/29: Awaiting bed at [facility]. Remaining afebrile. WBC 5800, creat 0.19.  5/31. Remaining afebrile. WBC 6000. . Remains weak and frail. Alk Phos 694 with normal transaminases. Slightly improved bilateral patchy infiltrates.   6/1: She is anxious to move on but still waiting for insurance approval to [facility].  6/2: Still awaiting bed at [facility]. Remaining afebrile. WBC 8300. Alk Phos 705 but transaminases normal and bilirubine 0.4.    Review of Systems  Review of Systems   Unable to perform ROS: Intubated   Constitutional: Negative for chills and fever.        Did walk in cao today.    HENT:        Head droop.    Respiratory: Positive for cough (minimal.). Negative for sputum production, shortness of breath and wheezing.    Cardiovascular: Positive for chest pain (at CT site.  Apparently better.).   Gastrointestinal: Positive for nausea (better today. But holding emesis bag.). Negative for abdominal pain and diarrhea.   Genitourinary: Negative for dysuria.   Musculoskeletal: Negative for myalgias.   Neurological: Negative for headaches.      Physical Exam  Temp:  [36.1 °C (97 °F)-37.1 °C (98.7 °F)] 36.6 °C (97.9 °F)  Pulse:  [] 108  Resp:  [16-18] 18  BP: ()/(51-78) 98/60  SpO2:  [91 %-98 %] 93 %    Physical Exam  Constitutional:       General: She is not in acute distress.     Comments: Weak, feeble and head drooping.   HENT:      Head: Normocephalic.      Mouth/Throat:      Mouth: Mucous membranes are dry.   Eyes:      Conjunctiva/sclera: Conjunctivae normal.      Pupils: Pupils are equal, round, and reactive to light.   Neck:      Comments: Tracheostomy in place.   Cardiovascular:      Rate and Rhythm: Normal rate and regular rhythm.      Heart sounds: No murmur heard.     Pulmonary:      Effort: Pulmonary effort is normal.      Breath sounds: Normal breath sounds.      Comments: O2 sat 93-96% on FIO2 40% per tracheostomy.  Abdominal:      General:  Abdomen is flat.      Palpations: Abdomen is soft.      Tenderness: There is no abdominal tenderness.   Musculoskeletal:         General: No swelling.   Skin:     General: Skin is warm and dry.      Findings: No rash.   Neurological:      General: No focal deficit present.      Mental Status: She is alert.      Comments: Slow responses to questions. Seems drowsy.   Psychiatric:      Comments: Withdrawn.     Constitutional:       Appearance: Normal appearance. Awake and alert  HENT:      Head: Normocephalic.      Comments: NG tube  Neck:      Comments: tracheostomy  Cardiovascular:      Rate and Rhythm: Normal rate and regular rhythm.   Pulmonary:      Comments: dimninished in R base  Abdominal:      General: Abdomen is flat. Bowel sounds are normal.   Neurological:      Mental Status: She is alert and oriented to person, place, and time.     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       - Acute left empyema s/p chest tube placement 4/23, decortication 5/17, chest tube removal 5/26 - Klebsiella  -Multiple acute septic pulmonary emboli bilaterally  -Acute bilateral pneumonia due to above     --Pulmonary edema  -Pseudotumor cerebri with underlying  shunt: possible arachnoiditis  -Chronic migraine headaches  -History of autoimmune  vasculitis  -Elevated alkaline phosphatase & bilirubin  -Acute encephalopathy due to sepsis      - anemia due to above          - acute fever on 5/19 likely from secondary VAP          - Secondary VAP R sided pneumonia          - decubitus ulcer sacral.      Plan   Tracheal aspirate recently 5/19 no growth, blood cultures 5/19 no growth. CXR shows worsening R sided infiltrates concerning for secondary infections after reviewing her CXR's between 5/18 and      5/19.  Repeated sputum cultures still reflecting her original Klebsiella empyema infection. She improved post operatively with defervescence and pressor weaning while on meropenem. Suggestive of either time, or an anaerobic infection that we weren't covering. Continued meropenem for 7 to 8 days, then switched back to cefazolin on 5/26 to complete therapy for endocarditis.     - Continue cefazolin 4 week target date after decortication 6/14/21  - Received Meropenem for VAP for 7 days finished 5/26  - Will need repeat Chest CT right before completion of antibiotics  - after completion of IV abx  Will need suppression therapy afterward for her HW (spinal stimulator,  shunt  targeting MSSA with Keflex, for example).    - chest tube removed 5/26  - ESR, CRP weekly Saturday  Dispo: Pt to transfer to [facility]. Micheal will follow patient there. Awaiting on bed availability     30 min spent  Case discussed with pt, RN

## 2021-06-02 NOTE — CARE PLAN
The patient is Watcher - Medium risk of patient condition declining or worsening    Shift Goals  Clinical Goals: Work with PT/OT  Patient Goals: Rest  Family Goals: NA    Progress made toward(s) clinical / shift goals:  see below  Problem: Pain Management  Goal: Pain level will decrease to patient's comfort goal  Outcome: Progressing     Problem: Knowledge Deficit - Standard  Goal: Patient and family/care givers will demonstrate understanding of plan of care, disease process/condition, diagnostic tests and medications  Outcome: Progressing     Problem: Fall Risk  Goal: Patient will remain free from falls  Outcome: Progressing     Problem: Mobility  Goal: Patient's capacity to carry out activities will improve  Outcome: Progressing     Problem: Hemodynamics  Goal: Patient's hemodynamics, fluid balance and neurologic status will be stable or improve  Outcome: Progressing     Problem: Fluid Volume  Goal: Fluid volume balance will be maintained  Outcome: Progressing     Problem: Urinary - Renal Perfusion  Goal: Ability to achieve and maintain adequate renal perfusion and functioning will improve  Outcome: Progressing     Problem: Respiratory  Goal: Patient will achieve/maintain optimum respiratory ventilation and gas exchange  Outcome: Progressing     Problem: Physical Regulation  Goal: Diagnostic test results will improve  Outcome: Progressing  Goal: Signs and symptoms of infection will decrease  Outcome: Progressing       Patient is not progressing towards the following goals:  none

## 2021-06-02 NOTE — DISCHARGE PLANNING
Anticipated Discharge Disposition: LANE    Action: LSW followed up with Trini carvalhoison from LANE. LANE has not received insurance authorization for [employer] medicare.    Barriers to Discharge: insurance auth    Plan: LSW to f/u with LANE on insurance auth

## 2021-06-02 NOTE — THERAPY
Physical Therapy   Daily Treatment     Patient Name: Enedelia Pang  Age:  50 y.o., Sex:  female  Medical Record #: [id number]  Today's Date: 6/2/2021     Precautions: Fall Risk, Tracheostomy , Swallow Precautions ( See Comments)    Assessment    Pt was pleasant and agreeable to therapy session. Upon arrival pt with right lateral flexion of cervical spine. Pt unable to reach midline without assist from therapist. Extensive education on positioning of spine,rn present as well. Performed PROM to reach midline. She was able to complete increased gait with fww and spv. No lob but did require two standing rest breaks due to pain in her neck. She completed STS with spv and transfers if Brenda as she took steps backwards versus side stepping. Will continue to follow while in house.     Plan    Continue current treatment plan.    DC Equipment Recommendations: Unable to determine at this time  Discharge Recommendations: Recommend post-acute placement for additional physical therapy services prior to discharge home         06/02/21 1510   Other Treatments   Other Treatments Provided Performed cervical spine PROM and stretching. Pt  cervical spine in Right lateral flexion. Educated on importance of positioning at midline.    Balance   Sitting Balance (Static) Fair +   Sitting Balance (Dynamic) Fair   Standing Balance (Static) Fair   Standing Balance (Dynamic) Fair -   Weight Shift Sitting Fair   Weight Shift Standing Fair   Comments with fww, no lob at this time    Gait Analysis   Gait Level Of Assist Supervised   Assistive Device Front Wheel Walker   Distance (Feet) 275   # of Times Distance was Traveled 1   Skilled Intervention Verbal Cuing   Comments two standing rest breaks due to pain in cervical spine    Bed Mobility    Supine to Sit Supervised   Sit to Supine Supervised   Scooting Supervised   Rolling Supervised   Comments hob elevated with due to trach    Functional Mobility   Sit to Stand Supervised   Bed, Chair,  Wheelchair Transfer Minimal Assist   Skilled Intervention Verbal Cuing;Sequencing   Comments spv for STS and Brenda as pt took steps backwards for transfers    Short Term Goals    Short Term Goal # 1 Pt will be able to perform supine<>sit with HOB flat/no rails with Spv in 6tx to promote fnx progression towards  I    Goal Outcome # 1 Progressing as expected   Short Term Goal # 2 Pt will be able to perform sit<>stand/transfers with FWW with SPv in 6tx to promote fnx progression towards I    Goal Outcome # 2 Goal met   Short Term Goal # 3 Pt will be able to ambulate 150ft with FWW iwth SPv in 6tx to promote fnx progression towards I    Goal Outcome # 3 Goal met   Short Term Goal # 4 Pt will be able to ambulate up/down FOS with rail wtih Spv in 6tx to promote eventual dc to home plan  (pt politely deferring )   Goal Outcome # 4 Goal not met

## 2021-06-02 NOTE — PROGRESS NOTES
Bedside report received from CAROLINA العلي. POC discussed with pt; all questions answered at this time.Family at bedside. Fall precuations in place. Trach in place, working appropriately. Wound Vac in place and working appropriately.  Call light within reach.

## 2021-06-02 NOTE — CARE PLAN
Problem: Knowledge Deficit - Standard  Goal: Patient and family/care givers will demonstrate understanding of plan of care, disease process/condition, diagnostic tests and medications  Outcome: Progressing     Problem: Fall Risk  Goal: Patient will remain free from falls  Outcome: Progressing   The patient is Stable - Low risk of patient condition declining or worsening    Shift Goals  Clinical Goals: mobility/ pulmonary improvement   Patient Goals: pain control     Progress made toward(s) clinical / shift goals:  Pt moving and repositioning in bed on her own. Pt sitting edge of bed for dinner. Fall precautions in place. POC discussed. Awaiting insurance approval for LTAC

## 2021-06-03 PROCEDURE — A9270 NON-COVERED ITEM OR SERVICE: HCPCS | Performed by: HOSPITALIST

## 2021-06-03 PROCEDURE — 770020 HCHG ROOM/CARE - TELE (206)

## 2021-06-03 PROCEDURE — 97535 SELF CARE MNGMENT TRAINING: CPT

## 2021-06-03 PROCEDURE — 99233 SBSQ HOSP IP/OBS HIGH 50: CPT | Performed by: STUDENT IN AN ORGANIZED HEALTH CARE EDUCATION/TRAINING PROGRAM

## 2021-06-03 PROCEDURE — 94668 MNPJ CHEST WALL SBSQ: CPT

## 2021-06-03 PROCEDURE — 97110 THERAPEUTIC EXERCISES: CPT

## 2021-06-03 PROCEDURE — 700111 HCHG RX REV CODE 636 W/ 250 OVERRIDE (IP): Performed by: STUDENT IN AN ORGANIZED HEALTH CARE EDUCATION/TRAINING PROGRAM

## 2021-06-03 PROCEDURE — 700102 HCHG RX REV CODE 250 W/ 637 OVERRIDE(OP): Performed by: STUDENT IN AN ORGANIZED HEALTH CARE EDUCATION/TRAINING PROGRAM

## 2021-06-03 PROCEDURE — A9270 NON-COVERED ITEM OR SERVICE: HCPCS | Performed by: STUDENT IN AN ORGANIZED HEALTH CARE EDUCATION/TRAINING PROGRAM

## 2021-06-03 PROCEDURE — 94640 AIRWAY INHALATION TREATMENT: CPT

## 2021-06-03 PROCEDURE — 94760 N-INVAS EAR/PLS OXIMETRY 1: CPT

## 2021-06-03 PROCEDURE — 700111 HCHG RX REV CODE 636 W/ 250 OVERRIDE (IP): Performed by: INTERNAL MEDICINE

## 2021-06-03 PROCEDURE — 700102 HCHG RX REV CODE 250 W/ 637 OVERRIDE(OP): Performed by: HOSPITALIST

## 2021-06-03 RX ORDER — DIPHENHYDRAMINE HYDROCHLORIDE 50 MG/ML
50 INJECTION INTRAMUSCULAR; INTRAVENOUS EVERY 6 HOURS PRN
Status: DISCONTINUED | OUTPATIENT
Start: 2021-06-03 | End: 2021-06-18 | Stop reason: HOSPADM

## 2021-06-03 RX ORDER — NYSTATIN 100000 [USP'U]/G
POWDER TOPICAL 3 TIMES DAILY
Status: DISPENSED | OUTPATIENT
Start: 2021-06-03 | End: 2021-06-10

## 2021-06-03 RX ADMIN — ACETAMINOPHEN 500 MG: 325 TABLET ORAL at 21:33

## 2021-06-03 RX ADMIN — OXYCODONE 5 MG: 5 TABLET ORAL at 09:08

## 2021-06-03 RX ADMIN — CEFAZOLIN SODIUM 2 G: 2 INJECTION, SOLUTION INTRAVENOUS at 15:41

## 2021-06-03 RX ADMIN — OXYCODONE 5 MG: 5 TABLET ORAL at 00:06

## 2021-06-03 RX ADMIN — GABAPENTIN 300 MG: 300 CAPSULE ORAL at 15:41

## 2021-06-03 RX ADMIN — ACETAMINOPHEN 500 MG: 325 TABLET ORAL at 15:41

## 2021-06-03 RX ADMIN — ENOXAPARIN SODIUM 40 MG: 40 INJECTION SUBCUTANEOUS at 06:09

## 2021-06-03 RX ADMIN — OXYCODONE 5 MG: 5 TABLET ORAL at 21:34

## 2021-06-03 RX ADMIN — ACETAMINOPHEN 500 MG: 325 TABLET ORAL at 09:08

## 2021-06-03 RX ADMIN — METHADONE HYDROCHLORIDE 30 MG: 10 TABLET ORAL at 06:09

## 2021-06-03 RX ADMIN — PAROXETINE HYDROCHLORIDE 10 MG: 10 TABLET, FILM COATED ORAL at 06:09

## 2021-06-03 RX ADMIN — RISPERIDONE 2 MG: 2 TABLET ORAL at 06:08

## 2021-06-03 RX ADMIN — DIAZEPAM 5 MG: 5 TABLET ORAL at 21:34

## 2021-06-03 RX ADMIN — DULOXETINE HYDROCHLORIDE 60 MG: 60 CAPSULE, DELAYED RELEASE ORAL at 06:09

## 2021-06-03 RX ADMIN — DIPHENHYDRAMINE HYDROCHLORIDE 50 MG: 50 INJECTION INTRAMUSCULAR; INTRAVENOUS at 18:22

## 2021-06-03 RX ADMIN — GABAPENTIN 300 MG: 300 CAPSULE ORAL at 21:33

## 2021-06-03 RX ADMIN — CEFAZOLIN SODIUM 2 G: 2 INJECTION, SOLUTION INTRAVENOUS at 21:34

## 2021-06-03 RX ADMIN — DIVALPROEX SODIUM 500 MG: 125 CAPSULE ORAL at 06:08

## 2021-06-03 RX ADMIN — DULOXETINE HYDROCHLORIDE 60 MG: 60 CAPSULE, DELAYED RELEASE ORAL at 17:49

## 2021-06-03 RX ADMIN — CEFAZOLIN SODIUM 2 G: 2 INJECTION, SOLUTION INTRAVENOUS at 06:08

## 2021-06-03 RX ADMIN — RISPERIDONE 2 MG: 2 TABLET ORAL at 17:49

## 2021-06-03 RX ADMIN — DIVALPROEX SODIUM 500 MG: 125 CAPSULE ORAL at 21:33

## 2021-06-03 RX ADMIN — DIVALPROEX SODIUM 500 MG: 125 CAPSULE ORAL at 15:41

## 2021-06-03 RX ADMIN — DOCUSATE SODIUM 50 MG AND SENNOSIDES 8.6 MG 2 TABLET: 8.6; 5 TABLET, FILM COATED ORAL at 06:08

## 2021-06-03 RX ADMIN — GABAPENTIN 300 MG: 300 CAPSULE ORAL at 06:09

## 2021-06-03 RX ADMIN — NYSTATIN: 100000 POWDER TOPICAL at 17:49

## 2021-06-03 RX ADMIN — OXYCODONE 5 MG: 5 TABLET ORAL at 15:41

## 2021-06-03 ASSESSMENT — ENCOUNTER SYMPTOMS
CONSTIPATION: 0
FEVER: 0
DIARRHEA: 0
SHORTNESS OF BREATH: 0
COUGH: 0
VOMITING: 0
WEAKNESS: 1
CHILLS: 0
ABDOMINAL PAIN: 0
NAUSEA: 0

## 2021-06-03 ASSESSMENT — GAIT ASSESSMENTS: DISTANCE (FEET): 200

## 2021-06-03 ASSESSMENT — COGNITIVE AND FUNCTIONAL STATUS - GENERAL
DAILY ACTIVITIY SCORE: 17
SUGGESTED CMS G CODE MODIFIER DAILY ACTIVITY: CK
TOILETING: A LITTLE
HELP NEEDED FOR BATHING: A LITTLE
PERSONAL GROOMING: A LITTLE
DRESSING REGULAR UPPER BODY CLOTHING: A LITTLE
EATING MEALS: TOTAL

## 2021-06-03 ASSESSMENT — PAIN DESCRIPTION - PAIN TYPE
TYPE: ACUTE PAIN

## 2021-06-03 ASSESSMENT — FIBROSIS 4 INDEX: FIB4 SCORE: 0.68

## 2021-06-03 NOTE — PROGRESS NOTES
12hr cc complete    Monitor Summary:  Sinus rhythm - sinus tach  bpm  Ectopy: NA  0.16 / 0.08 / 0.36

## 2021-06-03 NOTE — CARE PLAN
Problem: Pain Management  Goal: Pain level will decrease to patient's comfort goal  Outcome: Progressing     Problem: Fall Risk  Goal: Patient will remain free from falls  Outcome: Progressing     Problem: Mobility  Goal: Patient's capacity to carry out activities will improve  Outcome: Progressing     Problem: Hemodynamics  Goal: Patient's hemodynamics, fluid balance and neurologic status will be stable or improve  Outcome: Progressing       The patient is Watcher - Medium risk of patient condition declining or worsening    Shift Goals  Clinical Goals: PT/OT; wound care; wean oxygen; trach care  Patient Goals: rest; pain relief; walk  Family Goals: NA

## 2021-06-03 NOTE — CARE PLAN
Problem: Fall Risk  Goal: Patient will remain free from falls  Outcome: Progressing    Note: Patient calls appropriately.    The patient is Watcher - Medium risk of patient condition declining or worsening    Shift Goals  Clinical Goals: Work with PT/OT  Patient Goals: Rest  Family Goals: NA

## 2021-06-03 NOTE — WOUND TEAM
Received consult for R flank prevena after surgery signed off. Prevena removed revealing dry stapled incision underneath. Island dressing placed. Primary nurse to clarify with hospitalist when staples are ok to be removed. Sacrum and back also assessed. Severe MASD with satellite lesions noted. Antifungal powder ordered. See dressing care orders. Wound team signing off. Nursing to re-consult for new or worsening wounds.

## 2021-06-03 NOTE — THERAPY
Occupational Therapy  Daily Treatment     Patient Name: Enedelia Pang  Age:  50 y.o., Sex:  female  Medical Record #: [id number]  Today's Date: 6/3/2021     Precautions: (P) Fall Risk, Tracheostomy , Swallow Precautions ( See Comments)  Comments: (P) wound vac    Assessment    Pt very motivated to work with therapy today to mobilize. Pt's reported her goal was to improve her walking endurance. Pt demonstrated improved tolerance today for activity and was educated on pacing. Pt continues to demonstrate R lateral neck lean and required verbal/visual/tactile cues throughout for neutral position. Pt responded well to self initiated tactile cues for maintaining nuetral spine position. Pt also demonstrated improved LB dressing and toileting techniques with compensatory strategies. Pt primarily limited by difficulty with duel tasking (including maintaining neck position during functional tasks), limited UE strength, and impaired cognition impacting ADLs and functional mob. Will continue to follow while in house for progression with self cares and functional mobility.     Plan    Continue current treatment plan.    DC Equipment Recommendations: (P) Unable to determine at this time  Discharge Recommendations: (P) Recommend post-acute placement for additional occupational therapy services prior to discharge home       06/03/21 1315   Precautions   Precautions Fall Risk;Tracheostomy ;Swallow Precautions ( See Comments)   Comments wound vac   Pain   Pain Scales 0 to 10 Scale    Intervention Declines   Cognition    Comments very motivated to mobilize today; improved multitasking with attending to head/neck allignment during challenging function tasks   Other Treatments   Other Treatments Provided Focused on head postioning in neutral using pt initated tactile cues for feedback.  assisted with neutral head position with verbal/tactile cues. Pt responded best to tactile cues for repositioning   Balance   Sitting Balance  (Static) Fair +   Sitting Balance (Dynamic) Fair   Standing Balance (Static) Fair   Standing Balance (Dynamic) Fair -   Weight Shift Sitting Fair   Weight Shift Standing Fair   Skilled Intervention Postural Facilitation;Compensatory Strategies;Verbal Cuing;Tactile Cuing   Comments with FWW; no loss of balance   Bed Mobility    Supine to Sit Supervised   Sit to Supine Supervised   Scooting Supervised   Rolling Supervised   Skilled Intervention Compensatory Strategies   Activities of Daily Living   Grooming Minimal Assist  ( assist with grooming in seated)   Lower Body Dressing Supervision   Toileting Supervision   Skilled Intervention Compensatory Strategies;Verbal Cuing;Tactile Cuing;Postural Facilitation   Comments cues for neutral positioning throughout functional tasks   How much help from another person does the patient currently need...   Putting on and taking off regular lower body clothing? 4   Bathing (including washing, rinsing, and drying)? 3   Toileting, which includes using a toilet, bedpan, or urinal? 3   Putting on and taking off regular upper body clothing? 3   Taking care of personal grooming such as brushing teeth? 3   Eating meals? 1   6 Clicks Daily Activity Score 17   Functional Mobility   Sit to Stand Supervised   Toilet Transfers Minimal Assist   Transfer Method Stand Step   Mobility hallway and room distances   Distance (Feet) 200   # of Times Distance was Traveled 1   Skilled Intervention Verbal Cuing;Tactile Cuing;Sequencing;Postural Facilitation;Compensatory Strategies   Comments with FWW; cues for use of FWW in the context of toilet txfs   Activity Tolerance   Sitting in Chair up in chair post session   Sitting Edge of Bed 10 min   Standing 25 min   Patient / Family Goals   Patient / Family Goal #1 to get better   Short Term Goals   Short Term Goal # 1 LB dressing with min A   Goal Outcome # 1 Goal met, new goal added   Short Term Goal # 1 B  Pt will demo LB dressing w/ SPV   Goal  Outcome  # 1 B Progressing as expected   Short Term Goal # 2 seated g/h with SPV   Goal Outcome # 2 Goal met, new goal added   Short Term Goal # 2 B  standing g/h with SPV   Goal Outcome # 2 B Progressing as expected   Short Term Goal # 3 bsc txf with mod A   Goal Outcome # 3 Goal met, new goal added   Short Term Goal # 3 B toilet txf with SPV and no v/cs for safety/technique   Goal Outcome # 3 B Progressing as expected   Education Group   Role of Occupational Therapist Patient Response Patient;Acceptance;Explanation;Reinforcement Needed;Action Demonstration   Energy Conservation Patient Response Patient;Acceptance;Explanation;No Learning Evidence;Reinforcement Needed   Transfers Patient Response Patient;Acceptance;Explanation;Action Demonstration;Reinforcement Needed   ADL Patient Response Patient;Acceptance;Explanation;Action Demonstration;Reinforcement Needed   Anticipated Discharge Equipment and Recommendations   DC Equipment Recommendations Unable to determine at this time   Discharge Recommendations Recommend post-acute placement for additional occupational therapy services prior to discharge home   Interdisciplinary Plan of Care Collaboration   IDT Collaboration with  Nursing   Patient Position at End of Therapy In Bed;Phone within Reach;Tray Table within Reach;Call Light within Reach   Collaboration Comments OT tx and recs   Session Information   Date / Session Number  6/3 3 (1/3 6/9)   Priority 2

## 2021-06-03 NOTE — PROGRESS NOTES
Infectious Disease Progress Note    Author: Brian Omer M.D. Date & Time created: 6/3/2021  1:04 PM     Cefazolin 5/26-Day #9  Target Date 6/14/21     Meropenem 5/19-5/26   Cefepime 4/23 5/18-  s/p 23 days  Cefazolin 5/18-5/19     Thoracoscopy & Decortication - 4/28  Decortication 5/17     PRIOR Rx:   Zosyn 4/22-4/23  Previous: Unasyn, Zosyn, Vanco, Daptomycin  Ceftaroline: start 4/13-4/15  Nafcillin 2g Q4 hrs 4/15-4/23 4/14: Unable to give name of previous NSG or neurologist. Seems confused, but able to say some sentences fluently.   4/15: Line cultures now growing MSSA. As well as from the blood. Repeat blood culture 4/13+ in both sets. Patient has worsening confusion. CSF fluid analyses suggest pleocytosis. Cultures are no growth from the CSF. Chest CT was ordered this morning indicating a loculated right pleural effusion as well as bilateral septic emboli. Dr. Mack spoke with Dr. Oh yesterday and no surgical intervention unless clinical deterioration.  4/16: Increased respiratory distress.  Tachypneic.  CT with loculated collection.  Dr Resendiz not available.  No thoracic surgeon available.  Plans to have pulmonary place CT.  Transferring to ICU.  4/17: Transferred to [facility] last night. Hgb dropped to 6.4 requiring PRBC transfusion. Requiring 2 pressors. Fio2 50%. Febrile 38.8. Pending OR decortication of empyema and hemothorax. Chest tube placed at [facility] shows 8,371 WBC, ,000, 74% N  4/18: Chest tube was upsized by surgery yesterday, no decortication. WBC 22k. Fio2 40%. Vasopressin. Levophed down to 2mcg. Afebrile 24 hrs. Last fever 38.6 on 4/16.   4/19: Still on vent. Levo 3 mcg, vasopressin. Afebrile 72 hours. WBC 21k. BC 4/17 NGTD x 48 hours. Unable to do MRI brain given neurostimulator per RN.   4/20: Remaining afebrile. Hb 6.2 and undergoing transfusion today.WBC 24,100, 5% bands.1700 ml CT output. Copious bloody ET secretions. No pressors. Receiving dornase and TPA per CT. Right  pleural effusion not large per CXR today.   4/21: WBC 31k. Bronchoscopy yesterday showing blood. Cultures sent. TPA on hold per RN due to arvind blood from chest tube requiring PRBC transfusion for hgb 6. Pending chest CT today. Per , spinal stimulator is non-MRI compatible. Levophed 10mcg. 30% Fio2. Afebrile.   4/22: Fever 101.3 this am. WBC 20,300 down from 32,200 yesterday. BAL growing Klebsiella pneumoniae but susceptibility panel not set up until today. CTA of brain shows no lesion. CT of chest shows enlarging cavitary lung lesions bilat and large loculated new left pleural effusion and large hydropneumothorax on right. TPA & dornase infusions of right chest ended 4/20 after considerable bleeding requiring transfusion. Hb now down again to 6.8.  4/23: WBC 23k. Fio2 40%. Tmax 38.1. US of stiumlator shows small fluid collection but no drainable abscess. Pending MICAH today. Pending L chest tube placement  4/24: Continue fever 100.8-101.8. WBC 16,600. MICAH shows large vegetations on both tricuspid valve and mitral valve with mild TR & MR.  No aortic root abscess. Left chest tube placed yesterday yielding 8 ml of old bloody fluid. Bronch today revealed copious thick maroon secretions in distal trachea and both mainstem bronchi. On the right these were obstructive. Remaining off pressors. Last positive blood cultures (MSSA) were 4/16, negative 4/17.  4/25: Fever 100.6 this AM. wBC 13,300. Hb 6.6. CT: right hydropneumothorax increasing, right bronchopleural fistula, mediastinal shift ot the left , multiple cavitary pulmonary nodules, 3.1 cm left basilar mass, splenomegaly. CT of head shows dural thickening over the clivus. Lac+ GNR >100,000 col/ml growing from BAL of 4/24, presumed Klebsiella. Left peural fluid culture negative from 4/23.  4/26: Fever 100.4 last night. WBC 13,500. Hb 7.4. Plts 502,000. ESR >120. UA fairly clear except 30 mg% protein, 2-5 wbc and rare rbc. CXR unchanged except more prominent  pulmonary edema. GNR in BAL may be a different species of Klebsiella, and resistant to ampicillin & tmp-sulfa; confirmation pending.  4/28: Fever 100.8 last night. WBC 27,700. Hb 6.6. Plts 482,000. Creat 0.19. Kleb pneum in BAL on 4/24 is resistant to ampicillin & tmp-sulfa but otherwise sensitive. O2 sat 96% on FIO2 30% now, PEEP 8. Has been re-evaluated by thoracic surgeon, Dr. Ganser, who believes she will not wean without decortication on the right. Surgery anticipated today.  4/29: S/P right thoracoscopy with decortication with cultures NG at 24 hrs.  4/30: Had a bronch due to hypoxia and hypotension. CXR with worsening right hemithorax pulmonary opacities. Bloody mucous plugs removed. Pressors started. Febrile this am. Tachy in 130s. Pressors just removed. Tolerating vent, but not a SBT candidate at the moment.  5/1: Small air leak CT-2 every ~ 2/3 breathes. The K. pneumoniae from her thoracoscopy is sensitive to her cefepime so no antibiotic change needed.   5/2: No air leak today she tolerated 2  hrs of spontaneous breathing with support today.      5/3: Second day with SBT and doing well now for 2 hrs. Slight bump in temperature and      WBC's but no obvious clinical infectious changed so watch closely.      5/4: Fever still from time to time. WBCs trending up. Chest tubes in place. Trach.       5/5: She clearly is better today she was sitting up in bed with open eyes and follows commands. She still has low grade fever but her WBC's are dropping. Cefepime dose increased yesterday for increased CNS coverage if necessary. She will probably need further thoracic surgery as mentioned by Dr. Ganser. The issue of what to do with her stimulator is still up in the air for now as it probably will need to be removed but she is nort a candidate for more major surgery at this time.       5/7: She continues to improve and under go longer SBT trials. She just had a new PICC placed in her right arm and plan is to D/C  central line.        5/8: PICC placed. No fevers. Comfortable. Cortrak placed. Failing SBTs.  5/9: No acute issues. No fevers. Comfortable. Family at bedside.   5/10: No acute issues, fevers or WBC bumps. No changes in condition. She is to get her CT scan today and be reviewed by surgery  5/11:  at bedside.  Has been referred to [facility].  Repeat CT scan completed.  Results noted.  ? If Dr Ganser has seen.  Still with an empyema:  ? If candidate for further surgery.  5/12: Pending next thoracic surgery. No fever. Anxious.  5/13: ? Surgery date.  No one seems to know.  Had speaking valve in and having swallow eval with speech therapy  5/14: No acute issues. No fevers. Surgery Monday.  5/15: One CT accidentally pulled out yesterday.  Has been on T piece and tolerating. Plans for surgery Monday afternoon.  5/16: On schedule for surgery 5/17: 4:30 pm.  Moved to room T614.  No new issues.  5/17: Was sitting up in the bedside chair since change of shift.  Now walking in hallways with RN and CNA.  5/18: Decortication yesterday with 2 chest tube placement. WBC improving 21->14k  5/19: Hypotension and tachycardia.  Decreased H/H.  Placed back on vent to rest.  Dr Lozano in process of placing new line.  Antibiotics were deesculated yesterday to Cefazolin.  No cx were taken at surgery.  5/20: Pt was febrile yest afternoon 38.2, but now afebrile overnight after meropenem. WBC improved 11k->8k. Fio2 30%. Phenylephrine now off this morning. CXR shows new R consolidations.   5/21: Off pressors.  On vent: FiO2 30%, PEEP 8, PS 5 Received pRBCs yesterday.  5/22: Remains afebrile, off pressors. FiO2 30%.   5/23: Afebrile. FiO2 30%. Bronchoscopy yesterday normal.   5/24: No new cultures obtained at last bronchoscopy.  On  T piece this am: FiO2 30%.  5/25: Remaining afebrile. WBC 8600.   5/26: Chest tube removed today. Possible transfer to [facility].  5/27: O2 sat usuallyl 95% but intermittent desat to 87-89%.  Remaining afebrile.WBC 6100.  Alk phos 641 but normal transaminases.   5/28: Pending [facility] transfer. NO new issues overnight  5/29: Awaiting bed at [facility]. Remaining afebrile. WBC 5800, creat 0.19.  5/31. Remaining afebrile. WBC 6000. . Remains weak and frail. Alk Phos 694 with normal transaminases. Slightly improved bilateral patchy infiltrates.   6/1: She is anxious to move on but still waiting for insurance approval to [facility].  6/2: Still awaiting bed at [facility]. Remaining afebrile. WBC 8300. Alk Phos 705 but transaminases normal and bilirubine 0.4.     Interval History:  Past 24 hrs RN notes reviewed.    Labs Reviewed, Medications Reviewed, Radiology Reviewed, Wound Reviewed, Fluids Reviewed and GI Nutrition Reviewed    Review of Systems:  Review of Systems   Constitutional: Negative for chills and fever.   Respiratory: Negative for cough and shortness of breath.    Cardiovascular: Negative for chest pain.   Gastrointestinal: Negative for abdominal pain, constipation, diarrhea, nausea and vomiting.   Genitourinary: Negative for dysuria.   Neurological: Positive for weakness (but walking).       Physical Exam:  Physical Exam  Vitals and nursing note reviewed.   Constitutional:       General: She is not in acute distress.     Appearance: She is not ill-appearing, toxic-appearing or diaphoretic.   HENT:      Head: Normocephalic and atraumatic.   Cardiovascular:      Rate and Rhythm: Normal rate and regular rhythm.      Heart sounds: No murmur heard.     Pulmonary:      Effort: Pulmonary effort is normal. No respiratory distress.      Breath sounds: No wheezing or rhonchi.      Comments: BS clear bilaterally but better on R vs L.  Abdominal:      General: Abdomen is flat. There is no distension.      Palpations: Abdomen is soft.      Tenderness: There is no abdominal tenderness. There is no guarding.   Skin:     General: Skin is warm and dry.   Neurological:      Mental Status: She is alert.   Psychiatric:         Behavior: Behavior normal.       Comments: Flat affect.         Labs:  No results found for this or any previous visit (from the past 24 hour(s)).  Results     ** No results found for the last 168 hours. **        Hemodynamics:  Temp (24hrs), Av.7 °C (98 °F), Min:35.8 °C (96.5 °F), Max:37.1 °C (98.8 °F)  Temperature: 36.8 °C (98.2 °F)  Pulse  Av.4  Min: 40  Max: 220   Blood Pressure: (!) 98/57 ( Nurse notified )    PICC Double Lumen 21 Lumen 1: Purple Lumen 2: Red Right Brachial (Active)   Site Assessment Clean;Dry;Intact 21 0400   Lumen 1 Status Flushed;Scrubbed the hub prior to access;Normal saline locked 21 0400   Lumen 2 Status Flushed;Scrubbed the hub prior to access;Normal saline locked 21 0400   Line Secured at (cm) 1 cm 21 1118   Extremity Circumference (cm) 30.5 cm 21 1118   Dressing Type Biopatch;Occlusive;Securing device;Transparent 21 0400   Dressing Status Clean;Dry;Intact 21 0400   Dressing Intervention Dressing changed per protocol 215   Dressing Change Due 21 1121   Date Primary Tubing Changed 21 1300   Date Secondary Tubing Changed 21 1300   Date IV Connector(s) Changed 21 2100   NEXT Primary Tubing Change  21 1121   NEXT Secondary Tubing Change  21 1121   NEXT IV Connector(s) Change 21 2100   Line Necessity Assessed Antibiotic Therapy Greater than 7 Days 21 1121   $ Double Lumen PICC Charge Single kit used 21 1118     Wound:  @WOUNDLDA(4)@     Fluids:  Intake/Output                             21 0700 - 21 0659 21 07 - 21 0659 21 07 - 06/04/21 659 Total  Total  Total                    Intake    P.O.  --  120 120  --  -- --  --  -- --    P.O. -- 120 120 -- -- -- -- -- --    Total Intake -- 120 120 -- -- -- -- -- --       Output    Urine  1000  1150 2150   --  -- --  --  -- --    Urine Void (mL) -- 700 700 -- -- -- -- -- --    Output (mL) (External Urinary Catheter (Female Wick)) [phone] -- -- -- -- -- --    Drains  --  10 10  --  -- --  --  -- --    Output (mL) (Negative Pressure Wound Therapy Breast;Flank Right) -- 10 10 -- -- -- -- -- --    Stool  --  -- --  --  -- --  --  -- --    Number of Times Stooled -- -- -- 1 x -- 1 x -- -- --    Total Output 1000 1160 2160 -- -- -- -- -- --       Net I/O     -1000 -1040 -2040 -- -- -- -- -- --        Weight: 59.9 kg (132 lb 0.9 oz)  GI/Nutrition:  Orders Placed This Encounter   Procedures   • Diet Order Diet: Level 4 - Pureed (speaking valve on for all PO intake); Liquid level: Level 2 - Mildly Thick; Tray Modifications (optional): No Straws, SLP - 1:1 Supervision by Nursing, SLP - Deliver to Nursing Station     Standing Status:   Standing     Number of Occurrences:   1     Order Specific Question:   Diet:     Answer:   Level 4 - Pureed [25]     Comments:   speaking valve on for all PO intake     Order Specific Question:   Liquid level     Answer:   Level 2 - Mildly Thick     Order Specific Question:   Tray Modifications (optional)     Answer:   No Straws     Order Specific Question:   Tray Modifications (optional)     Answer:   SLP - 1:1 Supervision by Nursing     Order Specific Question:   Tray Modifications (optional)     Answer:   SLP - Deliver to Nursing Station     Medications:  Current Facility-Administered Medications   Medication Last Admin   • diphenhydrAMINE (BENADRYL) tablet/capsule 50 mg 50 mg at 06/02/21 1358   • acetaminophen (TYLENOL) tablet 500 mg 500 mg at 06/03/21 0908   • divalproex (DEPAKOTE SPRINKLE) capsule 500 mg 500 mg at 06/03/21 0608   • DULoxetine (CYMBALTA) capsule 60 mg 60 mg at 06/03/21 0609   • gabapentin (NEURONTIN) capsule 300 mg 300 mg at 06/03/21 0609   • methadone (DOLOPHINE) tablet 30 mg 30 mg at 06/03/21 0609   • PARoxetine (PAXIL) tablet 10 mg 10 mg at 06/03/21 0609   •  risperiDONE (RISPERDAL) tablet 2 mg 2 mg at 06/03/21 0608   • senna-docusate (PERICOLACE or SENOKOT S) 8.6-50 MG per tablet 2 tablet 2 tablet at 06/03/21 0608    And   • polyethylene glycol/lytes (MIRALAX) PACKET 1 Packet      And   • magnesium hydroxide (MILK OF MAGNESIA) suspension 30 mL      And   • bisacodyl (DULCOLAX) suppository 10 mg     • acetaminophen (Tylenol) tablet 650 mg 650 mg at 05/30/21 0457   • diazePAM (VALIUM) tablet 5 mg 5 mg at 06/02/21 2348   • oxyCODONE immediate-release (ROXICODONE) tablet 5 mg 5 mg at 06/03/21 0908   • ceFAZolin in dextrose (ANCEF) IVPB premix 2 g Stopped at 06/03/21 0638   • enoxaparin (LOVENOX) inj 40 mg 40 mg at 06/03/21 0609   • Respiratory Therapy Consult     • ondansetron (ZOFRAN) syringe/vial injection 4 mg 4 mg at 06/02/21 0703   • labetalol (NORMODYNE/TRANDATE) injection 10 mg 10 mg at 05/04/21 0226   • ipratropium-albuterol (DUONEB) nebulizer solution       Medical Decision Making, by Problem:  Active Hospital Problems    Diagnosis    • *Empyema of right pleural space (HCC) [J86.9]    • Shock (HCC) [R57.9]    • Debility [R53.81]    • Elevated alkaline phosphatase level [R74.8]    • Agitation requiring sedation protocol [R45.1]    • Spinal cord stimulator status [Z96.89]    • Goals of care, counseling/discussion [Z71.89]    • Fever [R50.9]    • Pulmonary abscess (HCC) [J85.2]    • 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]    • Pleural effusion, left [J90]    • Anasarca [R60.1]    • Thrombocytosis (HCC) [D47.3]    • Atelectasis [J98.11]    • Acute respiratory failure with hypoxia (HCC) [J96.01]    • Prolonged Q-T interval on ECG [R94.31]    • Acute bacterial endocarditis [I33.0]    • CSF pleocytosis [D72.9]    • Bacteremia due to methicillin susceptible Staphylococcus aureus (MSSA) [R78.81, B95.61]    • History of vasculitis [Z86.79]    • Pneumonia [J18.9]    • History of complicated migraines [G43.109]    • GERD (gastroesophageal reflux disease) [K21.9]    • Hyponatremia [E87.1]    •  Tachycardia [R00.0]    • Pseudotumor cerebri [G93.2]    • H/O: CVA (cerebrovascular accident) [Z86.73]    • Chronic pain syndrome [G89.4]    • Port or reservoir infection [T80.212A]    • Thrombocytopenia (HCC) [D69.6]    • Septic shock (HCC) [A41.9, R65.21]    • Anemia [D64.9]    • Sepsis (HCC) [A41.9]    • Hypokalemia [E87.6]    • Hypomagnesemia [E83.42]    • S/P  shunt [Z98.2]    • Anxiety [F41.9]    • Acute encephalopathy [G93.40]      -Multiple acute septic pulmonary emboli bilaterally  -Acute bilateral pneumonia due to above     --Pulmonary edema  -Pseudotumor cerebri with underlying  shunt: possible arachnoiditis  -Chronic migraine headaches  -History of autoimmune vasculitis  -Elevated alkaline phosphatase & bilirubin  -Acute encephalopathy due to sepsis      - anemia due to above          - acute fever on 5/19 likely from secondary VAP          - Secondary VAP R sided pneumonia          - decubitus ulcer sacral.      Plan   Tracheal aspirate recently 5/19 no growth, blood cultures 5/19 no growth. CXR shows worsening R sided infiltrates concerning for secondary infections after reviewing her CXR's between 5/18 and      5/19.  Repeated sputum cultures still reflecting her original Klebsiella empyema infection. She improved post operatively with defervescence and pressor weaning while on meropenem. Suggestive of either time, or an anaerobic infection that we weren't covering. Continued meropenem for 7 to 8 days, then switched back to cefazolin on 5/26 to complete therapy for endocarditis.     - Continue cefazolin 4 week target date after decortication 6/14/21  - Received Meropenem for VAP for 7 days finished 5/26  - Will need repeat Chest CT right before completion of antibiotics  - after completion of IV abx  Will need suppression therapy afterward for her HW (spinal stimulator,  shunt  targeting MSSA with Keflex, for example).    - chest tube removed 5/26  - ESR, CRP weekly Saturday  Dispo: Pt to  transfer to [facility]. [facility] will follow patient there. Awaiting on bed availability     She is still waiting for insurance approval to [facility]. Continue with antibiotics IV as planned then to PO chronic suppression. Case and treatment reviewed with patient, , micro, RN notes as RN not available and CM 25+ min on floor with 50% face to face view and 100% in direct patient care/counseling and D/C planning today.

## 2021-06-04 PROCEDURE — 700102 HCHG RX REV CODE 250 W/ 637 OVERRIDE(OP): Performed by: HOSPITALIST

## 2021-06-04 PROCEDURE — 97116 GAIT TRAINING THERAPY: CPT | Mod: CQ

## 2021-06-04 PROCEDURE — 700111 HCHG RX REV CODE 636 W/ 250 OVERRIDE (IP): Performed by: INTERNAL MEDICINE

## 2021-06-04 PROCEDURE — 770006 HCHG ROOM/CARE - MED/SURG/GYN SEMI*

## 2021-06-04 PROCEDURE — A9270 NON-COVERED ITEM OR SERVICE: HCPCS | Performed by: HOSPITALIST

## 2021-06-04 PROCEDURE — 92526 ORAL FUNCTION THERAPY: CPT

## 2021-06-04 PROCEDURE — 94640 AIRWAY INHALATION TREATMENT: CPT

## 2021-06-04 PROCEDURE — 94668 MNPJ CHEST WALL SBSQ: CPT

## 2021-06-04 PROCEDURE — 94669 MECHANICAL CHEST WALL OSCILL: CPT

## 2021-06-04 PROCEDURE — 99233 SBSQ HOSP IP/OBS HIGH 50: CPT | Performed by: STUDENT IN AN ORGANIZED HEALTH CARE EDUCATION/TRAINING PROGRAM

## 2021-06-04 PROCEDURE — 94760 N-INVAS EAR/PLS OXIMETRY 1: CPT

## 2021-06-04 PROCEDURE — 700111 HCHG RX REV CODE 636 W/ 250 OVERRIDE (IP): Performed by: STUDENT IN AN ORGANIZED HEALTH CARE EDUCATION/TRAINING PROGRAM

## 2021-06-04 RX ADMIN — RISPERIDONE 2 MG: 2 TABLET ORAL at 17:14

## 2021-06-04 RX ADMIN — OXYCODONE 5 MG: 5 TABLET ORAL at 17:14

## 2021-06-04 RX ADMIN — ACETAMINOPHEN 500 MG: 325 TABLET ORAL at 10:50

## 2021-06-04 RX ADMIN — DIVALPROEX SODIUM 500 MG: 125 CAPSULE ORAL at 06:22

## 2021-06-04 RX ADMIN — GABAPENTIN 300 MG: 300 CAPSULE ORAL at 06:25

## 2021-06-04 RX ADMIN — CEFAZOLIN SODIUM 2 G: 2 INJECTION, SOLUTION INTRAVENOUS at 06:21

## 2021-06-04 RX ADMIN — DIVALPROEX SODIUM 500 MG: 125 CAPSULE ORAL at 21:44

## 2021-06-04 RX ADMIN — RISPERIDONE 2 MG: 2 TABLET ORAL at 06:25

## 2021-06-04 RX ADMIN — GABAPENTIN 300 MG: 300 CAPSULE ORAL at 21:44

## 2021-06-04 RX ADMIN — DULOXETINE HYDROCHLORIDE 60 MG: 60 CAPSULE, DELAYED RELEASE ORAL at 17:14

## 2021-06-04 RX ADMIN — ACETAMINOPHEN 500 MG: 325 TABLET ORAL at 21:45

## 2021-06-04 RX ADMIN — CEFAZOLIN SODIUM 2 G: 2 INJECTION, SOLUTION INTRAVENOUS at 21:45

## 2021-06-04 RX ADMIN — DIVALPROEX SODIUM 500 MG: 125 CAPSULE ORAL at 15:33

## 2021-06-04 RX ADMIN — OXYCODONE 5 MG: 5 TABLET ORAL at 21:44

## 2021-06-04 RX ADMIN — CEFAZOLIN SODIUM 2 G: 2 INJECTION, SOLUTION INTRAVENOUS at 15:33

## 2021-06-04 RX ADMIN — DULOXETINE HYDROCHLORIDE 60 MG: 60 CAPSULE, DELAYED RELEASE ORAL at 06:22

## 2021-06-04 RX ADMIN — ACETAMINOPHEN 500 MG: 325 TABLET ORAL at 15:33

## 2021-06-04 RX ADMIN — NYSTATIN: 100000 POWDER TOPICAL at 12:39

## 2021-06-04 RX ADMIN — DIPHENHYDRAMINE HYDROCHLORIDE 50 MG: 50 INJECTION INTRAMUSCULAR; INTRAVENOUS at 21:43

## 2021-06-04 RX ADMIN — OXYCODONE 5 MG: 5 TABLET ORAL at 12:44

## 2021-06-04 RX ADMIN — ENOXAPARIN SODIUM 40 MG: 40 INJECTION SUBCUTANEOUS at 06:21

## 2021-06-04 RX ADMIN — DIPHENHYDRAMINE HYDROCHLORIDE 50 MG: 50 INJECTION INTRAMUSCULAR; INTRAVENOUS at 12:44

## 2021-06-04 RX ADMIN — DIPHENHYDRAMINE HYDROCHLORIDE 50 MG: 50 INJECTION INTRAMUSCULAR; INTRAVENOUS at 06:15

## 2021-06-04 RX ADMIN — NYSTATIN: 100000 POWDER TOPICAL at 17:14

## 2021-06-04 RX ADMIN — PAROXETINE HYDROCHLORIDE 10 MG: 10 TABLET, FILM COATED ORAL at 06:25

## 2021-06-04 RX ADMIN — GABAPENTIN 300 MG: 300 CAPSULE ORAL at 15:33

## 2021-06-04 RX ADMIN — METHADONE HYDROCHLORIDE 30 MG: 10 TABLET ORAL at 06:22

## 2021-06-04 ASSESSMENT — ENCOUNTER SYMPTOMS
WHEEZING: 0
FEVER: 0
ABDOMINAL PAIN: 0
DIARRHEA: 0
COUGH: 1
MYALGIAS: 0
CHILLS: 0
NAUSEA: 1
SPUTUM PRODUCTION: 0
HEADACHES: 0
SHORTNESS OF BREATH: 0

## 2021-06-04 ASSESSMENT — FIBROSIS 4 INDEX: FIB4 SCORE: 0.68

## 2021-06-04 ASSESSMENT — PAIN DESCRIPTION - PAIN TYPE
TYPE: ACUTE PAIN

## 2021-06-04 ASSESSMENT — GAIT ASSESSMENTS
GAIT LEVEL OF ASSIST: SUPERVISED
ASSISTIVE DEVICE: FRONT WHEEL WALKER
DISTANCE (FEET): 100

## 2021-06-04 ASSESSMENT — COGNITIVE AND FUNCTIONAL STATUS - GENERAL
MOBILITY SCORE: 18
WALKING IN HOSPITAL ROOM: A LITTLE
TURNING FROM BACK TO SIDE WHILE IN FLAT BAD: A LITTLE
MOVING TO AND FROM BED TO CHAIR: A LITTLE
STANDING UP FROM CHAIR USING ARMS: A LITTLE
SUGGESTED CMS G CODE MODIFIER MOBILITY: CK
MOVING FROM LYING ON BACK TO SITTING ON SIDE OF FLAT BED: A LITTLE
CLIMB 3 TO 5 STEPS WITH RAILING: A LITTLE

## 2021-06-04 NOTE — PROGRESS NOTES
Hospital Medicine Daily Progress Note    Date of Service  6/4/2021    Chief Complaint  50 y.o. female admitted 4/16/2021 with recurrent infections    Hospital Course   This is a 50-year-old female with a past medical history of pseudotumor cerebri s/p  shunt implantation by Dr. 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  [facility] infectious disease is managing antibiotics for the patient.  Dr. Candelaria surgery removed the port on 4/12.  The patient was further identified to have endocarditis of the tricuspid valve, a lumbar puncture performed on 4/14 showed pleocytosis with negative Gram stain.  The patient suffered worsening leukocytosis and was found to have septic emboli per CT.  Initially a small pleural catheter was placed.  Significant loculations were encountered.  MSSA empyema was diagnosed.  Neurosurgery was consulted to comment on possibly removal of a  shunt, this was felt not appropriate at this time.  The patient remained on mechanical ventilation for 15 days, then a perc trach was placed.  The patient was eventually liberated from mechanical ventilation, the patient did have a right thoracoscopy and decortication of the lung performed on 4/28 by Dr. Ganser.  The patient was of identified to have multiple sources for MSSA including mitral valve, tricuspid valve vegetation, port, spinal stimulator,  shunt possibly.  Patient underwent repeat chest surgery, decortication with 2 chest tube placement on 5/18, had to be placed back on mechanical ventilation after surgery, central access was replaced on 5/19, the patient treated  in ICU with eventual chest tube removal with assistance of thoracic surgery and was deescalated in terms of respiratory support down to trach collar.  [place] in need of extensive rehabilitation, plan for LTAC level of care on discharge.    Interval Problem Update  Patient seen and examined today.    Patient tolerating treatment and therapies.  All Data, Medication data reviewed.  Case discussed with nursing as available.  Plan of Care reviewed with patient and notified of changes.  5/26 the patient on T-piece, 30% FiO2, no increase in work of breathing, small amount of secretions, good cough, chest x-ray improving, chest surgeon removed both chest tubes, ongoing antibiotic treatment with assistance of [facility] infectious disease.  Transfer options being evaluated.  Afebrile, tachycardic with heart rate in the 1 teens, blood pressure is soft but maintaining MAP.  Laboratory data reviewed, electrolytes are balanced, no leukocytosis, improving hemoglobin, chest x-ray with stable lines and tubes, stable ill-defined bilateral pulmonary opacities, no large effusions  5/27 the patient appears better, she is ambulating, the patient was cleared for diet, the patient is awake alert and following, afebrile, blood pressure between 90 and 130, no increase in respiratory difficulty, afebrile, ongoing antibiotic treatment through 6/14  Ongoing rehab efforts, awaiting LTAC approval    5/28 patient is in bed, core track removed, patient is tolerating diet, family is at bedside, discussed with , LTAC pending, patient tachycardic any symptoms no fever, blood pressure is soft but stable.  5/29 patient in bed, no fever or chills. Tolerating diet, vs stable. On T piece o2 requirement stable.   5/30 in bed looks comfortable, not in pain, o2 requirement increasing I will ordered cxr, discussed with RT today, continue iv abx per ID.   5/31 in bed feeling ok, o2 requirements decreased to 5L, no fever or chills. Discussed with nurse  staff.   6/1 Patient reports breathing has improved. Still with some pain at thoracotomy site, otherwise appears comfortable. Continue Abx until 6/14 per ID, will need long term Abx suppression with keflex following completion of Abx.  6/2 No acute events overnight. Remains on oxygen with T piece. Reports breathing better than yesterday. Remains on Abx. Pending LTAC approval  6/3 Patient wound vac removed by wound care per thoracic surgeon orders. Worsening rash on buttocks and back, appears to be folliculitis and related to moisture. Wound care following. Awaiting LTAC approval  6/4 Patient reports improvement in pruritis with IV benadryl. She reports improvement in strength and was out of bed. Per thoracic surgery note, sutures can be removed 6/9. Waiting on LTAC approval.    Consultants/Specialty  ID  Thoracic surgery  Pulmonary critical care    Code Status  Full Code    Disposition  Rehab, hopefully LTAC    Review of Systems  Review of Systems   Unable to perform ROS: Other   Poor communication secondary to tracheostomy    Physical Exam  Temp:  [36.1 °C (97 °F)-36.8 °C (98.2 °F)] 36.2 °C (97.2 °F)  Pulse:  [] 100  Resp:  [16-20] 16  BP: ()/(53-63) 102/63  SpO2:  [96 %-99 %] 98 %    Physical Exam  Vitals and nursing note reviewed.   Constitutional:       General: She is not in acute distress.     Appearance: She is well-developed. She is ill-appearing. She is not diaphoretic.   HENT:      Head: Normocephalic and atraumatic.   Eyes:      General: No scleral icterus.  Neck:      Vascular: No JVD.   Cardiovascular:      Rate and Rhythm: Regular rhythm. Tachycardia present.   Pulmonary:      Effort: Pulmonary effort is normal. No respiratory distress.      Breath sounds: No stridor. Rales present.      Comments: Trach without inflammation  Abdominal:      General: There is no distension.      Palpations: Abdomen is soft.      Tenderness: There is no abdominal tenderness.   Skin:     Coloration: Skin is  pale.      Findings: Rash present.   Neurological:      Mental Status: She is alert and oriented to person, place, and time.      Motor: Weakness present.   Psychiatric:         Thought Content: Thought content normal.         Fluids    Intake/Output Summary (Last 24 hours) at 6/4/2021 1600  Last data filed at 6/3/2021 1614  Gross per 24 hour   Intake --   Output 750 ml   Net -750 ml       Laboratory  Recent Labs     06/02/21  0311   WBC 8.3   RBC 3.47*   HEMOGLOBIN 9.6*   HEMATOCRIT 32.1*   MCV 92.5   MCH 27.7   MCHC 29.9*   RDW 54.2*   PLATELETCT 330   MPV 9.8     Recent Labs     06/02/21  0311   SODIUM 136   POTASSIUM 4.2   CHLORIDE 96   CO2 32   GLUCOSE 86   BUN 6*   CREATININE 0.20*   CALCIUM 9.0                   Imaging  DX-CHEST-PORTABLE (1 VIEW)   Final Result      1.  Interval removal of the right-sided chest tubes. No pneumothorax is identified.   2.  Airspace opacities in the right are mildly improved and may represent atelectasis/consolidation.   3.  There is likely a small right pleural effusion.   4.  Patchy opacities on the left are mildly improved.   5.  Interval removal of the enteric tube.      DX-CHEST-PORTABLE (1 VIEW)   Final Result         1. Stable lines and tubes.   2. Stable ill-defined bilateral pulmonary opacities, right worse than left. No large pleural effusions.         DX-CHEST-PORTABLE (1 VIEW)   Final Result      1.  Decrease in volume of right pleural fluid collection/empyema with 2 right chest tubes in place. No pneumothorax identified.      2.  No focal consolidation in the left lung.      DX-CHEST-PORTABLE (1 VIEW)   Final Result      No significant interval change.      DX-CHEST-PORTABLE (1 VIEW)   Final Result         1.  Hazy right pulmonary infiltrates and/or effusion, stable compared to prior study.      DX-CHEST-PORTABLE (1 VIEW)   Final Result         1.  Hazy right pulmonary infiltrates and/or effusion, stable compared to prior study.      DX-CHEST-PORTABLE (1 VIEW)    Final Result      1.  All lines and tubes appear appropriately located      2.  Consolidation throughout the right lung consistent with pneumonia      DX-CHEST-PORTABLE (1 VIEW)   Final Result         1.  Hazy right pulmonary infiltrates and/or effusion, increased compared to prior study.      DX-ABDOMEN FOR TUBE PLACEMENT   Final Result      Feeding tube looped in the stomach.      DX-CHEST-PORTABLE (1 VIEW)   Final Result         1. Stable lines and tubes. No pneumothorax detected.   2. Persistent opacities in the right perihilar region and in the right lung periphery, similar to prior study. Left lung is essentially clear.      DX-CHEST-PORTABLE (1 VIEW)   Final Result      Postoperative changes of the right chest with placement of an additional chest tube. There is improved aeration in the right lung. No significant pleural effusion or definite pneumothorax.      Right IJ central venous catheter tip projects over the proximal right atrium.      Hypoinflation with interstitial and hazy pulmonary opacities.      DX-CHEST-PORTABLE (1 VIEW)   Final Result         1.  Hazy right pulmonary infiltrates and/or effusion, similar compared to prior study.      DX-ABDOMEN FOR TUBE PLACEMENT   Final Result      Feeding tube tip projects over the expected location the gastric antrum.      DX-CHEST-PORTABLE (1 VIEW)   Final Result         1.  Hazy right pulmonary infiltrates and/or effusion, similar compared to prior study.      DX-CHEST-LIMITED (1 VIEW)   Final Result      1.  No pneumothorax. Only one chest tube is now seen in the right lung.   2.  The chest is otherwise stable.      DX-CHEST-PORTABLE (1 VIEW)   Final Result         1.  Hazy right pulmonary infiltrates and/or effusion, similar compared to prior study.      DX-CHEST-PORTABLE (1 VIEW)   Final Result         1.  Hazy right pulmonary infiltrates and/or effusion, similar compared to prior study.      DX-ABDOMEN FOR TUBE PLACEMENT   Final Result         1.   Nonspecific bowel gas pattern.   2.  Dobbhoff tube tip terminates overlying the expected location of the gastric antrum.   3.  Hazy bilateral lung base infiltrates, greater on the right.      DX-CHEST-PORTABLE (1 VIEW)   Final Result         1.  Hazy right pulmonary infiltrates and/or effusion, similar compared to prior study.      US-RUQ   Final Result         1.  Hepatomegaly   2.  Mild intrahepatic and extrahepatic biliary ductal dilatation, commonly associated with postcholecystectomy physiology, consider causes of biliary obstruction with additional workup as clinically appropriate      CT-CHEST,ABDOMEN,PELVIS WITH   Final Result      1.  Large empyema in the RIGHT hemithorax, slightly decreased in size from prior exam with chest tubes present.   2.  Consolidation remains.   3.  Multiple cavitary lesions again seen in the LEFT upper lobe, minimally decreased from prior, concerning for septic emboli.   4.  Supportive tubing is unchanged.   5.  Intrahepatic and extrahepatic biliary dilation, likely postoperative although distal stricture, stone or mass remain possibilities.   6.  Moderate pelvic fluid, nonspecific.   7.  No evidence of bowel obstruction or perforation.      DX-CHEST-PORTABLE (1 VIEW)   Final Result         1.  Hazy right pulmonary infiltrates and/or effusion, similar compared to prior study.      DX-CHEST-PORTABLE (1 VIEW)   Final Result         No significant interval change.            DX-CHEST-PORTABLE (1 VIEW)   Final Result         Interval removal of left central venous catheter. Interval adjustment of right PICC with tip in the SVC.         DX-ABDOMEN FOR TUBE PLACEMENT   Final Result      Feeding tube tip at the mid to distal stomach.      IR-PICC LINE PLACEMENT W/ GUIDANCE > AGE 5   Final Result                  Ultrasound-guided PICC placement performed by qualified nursing staff as    above.          DX-CHEST-PORTABLE (1 VIEW)   Final Result      1.  Stable cardiopulmonary findings.    2.  See comments above regarding the right-sided PICC position.      DX-CHEST-PORTABLE (1 VIEW)   Final Result      Stable chest findings compared to 5/5.      DX-CHEST-PORTABLE (1 VIEW)   Final Result      Stable chest findings compared with 5/3.      DX-ABDOMEN FOR TUBE PLACEMENT   Final Result         Feeding tube with tip projecting over the expected area of the stomach.      DX-CHEST-PORTABLE (1 VIEW)   Final Result      Stable chest findings compared with prior.      DX-CHEST-PORTABLE (1 VIEW)   Final Result         1. No significant interval change.      US-EXTREMITY ARTERY LOWER UNILAT RIGHT   Final Result      DX-CHEST-PORTABLE (1 VIEW)   Final Result         1. No significant interval change.      IR-PICC LINE PLACEMENT W/ GUIDANCE > AGE 5   Final Result                  Ultrasound-guided PICC placement performed by qualified nursing staff as    above.          DX-CHEST-PORTABLE (1 VIEW)   Final Result         1.  Pulmonary edema and/or infiltrates, similar to prior study.   2.  Stable opacification right lung, likely effusion. Thoracostomy tubes are in place.   3.  Multiple cavitary appearing left pulmonary lesions, see CT performed April 27, 2021 for further characterization      DX-CHEST-PORTABLE (1 VIEW)   Final Result         1.  Pulmonary edema and/or infiltrates, similar to prior study.   2.  Single opacification right lung, likely effusion. Thoracostomy tubes are in place.   3.  Multiple cavitary appearing left pulmonary lesions, see CT performed April 27, 2021 for further characterization   4.  Soft tissue gas in the right chest wall      DX-CHEST-PORTABLE (1 VIEW)   Final Result         1.  Pulmonary edema and/or infiltrates, similar to prior study.   2.  Increased opacification right lung, likely increased effusion. Thoracostomy tubes are in place.   3.  Multiple cavitary appearing left pulmonary lesions, see CT performed April 27, 2021 for further characterization   4.  Soft tissue gas in the  right chest wall      DX-CHEST-PORTABLE (1 VIEW)   Final Result         1.  Pulmonary edema and/or infiltrates, similar to prior study.   2.  Right pneumothorax, stable compared to prior study, right thoracostomy tubes remain in place   3.  Multiple cavitary appearing left pulmonary lesions, see CT performed April 27, 2021 for further characterization   4.  Soft tissue gas in the right chest wall      DX-CHEST-PORTABLE (1 VIEW)   Final Result         1.  Pulmonary edema and/or infiltrates, similar to prior study.   2.  Right pneumothorax, interval decreased compared to prior study, interval placement of right thoracostomy tubes is noted.   3.  Multiple cavitary appearing left pulmonary lesions, see CT performed yesterday for further characterization   4.  Soft tissue gas in the right chest wall      DX-CHEST-PORTABLE (1 VIEW)   Final Result         1.  Pulmonary edema and/or infiltrates, similar to prior study.   2.  Right pneumothorax, stable compared to prior study, interval removal of right thoracostomy tube is noted.   3.  Multiple cavitary appearing left pulmonary lesions, see CT performed yesterday for further characterization   4.  Soft tissue gas in the right chest wall      DX-CHEST-LIMITED (1 VIEW)   Final Result         No significant interval change.      CT-CTA CHEST PULMONARY ARTERY W/ RECONS   Final Result         No CT evidence of pulmonary embolus.      Previous right chest tube were removed.      Grossly similar in size of the loculated right hydropneumothorax but there is increased layering fluid component. Unchanged mild shift of the mediastinal to the left.      Redemonstration of a pigtail catheter in the medial left lung base. Grossly similar multiple cavitary lesions in the left lung.      US-EXTREMITY VENOUS LOWER BILAT   Final Result      POCT BUTTERFLY-DUPLEX SCAN EXTREMITY VEINS LIMITED   Final Result      DX-CHEST-PORTABLE (1 VIEW)   Final Result         1.  Pulmonary edema and/or  infiltrates, similar to prior study.   2.  Right pneumothorax, somewhat decreased to prior study, interval removal of right thoracostomy tube is noted.   3.  Multiple cavitary appearing left pulmonary lesions, see CT performed yesterday for further characterization   4.  Soft tissue gas in the right chest wall      DX-CHEST-PORTABLE (1 VIEW)   Final Result         1.  Pulmonary edema and/or infiltrates, similar to prior study.   2.  Right pneumothorax, similar to prior study with thoracostomy tube in place.   3.  Multiple cavitary appearing left pulmonary lesions, see CT performed yesterday for further characterization      CT-CHEST (THORAX) W/O   Final Result      Large loculated right hydropneumothorax with interval increase in size of the pneumothorax and pleural fluid.      Right chest tube is in the posterior right thorax.      There is mediastinal shift to the left compatible with tension.      Small pericardial effusion.      Small loculated left pleural effusion with overlying atelectasis/consolidation.   Pigtail catheter is seen at the medial left lung base. Pleural effusion has decreased in size compared to prior.      There are multiple foci of air extending from the right lung to the pleural space suspicious for a bronchopleural fistula.      Multiple cavitary lesions bilaterally with mild interval decrease of the solid component of the cavitary nodules.      Noncavitary 3.1 cm masslike density is seen at the left lung base.      Findings may be related to septic emboli, malignancy or multifocal abscesses. Short interval follow-up recommended.      Soft tissue air on the right is again seen.      Splenomegaly      Findings discussed with Leti Sun.      DX-CHEST-PORTABLE (1 VIEW)   Final Result      No significant change from prior exam.      CT-HEAD W/O   Final Result      1.  RIGHT frontal ventriculostomy catheter unchanged in position.   2.  Mild cortical atrophy.   3.  No intracranial hemorrhage.    4.  Apparent dural thickening overlying the clivus.  Consider further evaluation with contrast-enhanced MRI.      DX-CHEST-LIMITED (1 VIEW)   Final Result      Interval left basilar pigtail catheter with diminished atelectasis, pleural fluid      Stable right hydropneumothorax with thoracostomy tube in place, considerable soft tissue gas and partial lung collapse      IR-CHEST TUBE-EMPYEMA LEFT   Final Result      1.  CT GUIDED LEFT  10 [place] PIGTAIL CHEST TUBE PLACEMENT FOR POSSIBLE EMPYEMA YIELDING OLD BLOODY FLUID.      2. A CHEST RADIOGRAPH IS FORTHCOMING.      EC-MICAH W/O CONT   Final Result      US-ABDOMEN LTD (SOFT TISSUE)   Final Result      No fluid seen surrounding the electronic implanted spinal stimulator device.      DX-CHEST-LIMITED (1 VIEW)   Final Result      1.  Large right hydropneumothorax with right-sided chest tube in place, likely stable to slightly decreased from prior study.   2.  Small left pleural effusion. Mild improved aeration in the left lung.   3.  Bilateral pulmonary opacities which could be related to combination of atelectasis, edema and/or infection..      CT-CTA HEAD WITH & W/O-POST PROCESS   Final Result      1.  Patent carotid and vertebral arteries.      2.  Again seen right frontal the jugular peritoneal shunt catheter. There is mild increase in volume of the lateral and third ventricles.      CT-CHEST (THORAX) W/O   Final Result      1.  Interval placement of a right-sided chest tube into a large loculated right-sided pleural effusion. There is now seen a large right-sided hydropneumothorax in the area that previously seen fluid. The chest tube extends into that hydropneumothorax.    There is some residual pleural fluid seen as well. A hydropneumothorax is somewhat loculated with components most prominently inferiorly and medially.      2.  New loculated left pleural effusion.      3.  Again seen multiple bilateral cavitary pulmonary masses which are more prominent than  previously seen with increasing cavitation and measure up to 3 cm size. Differential diagnosis includes septic emboli, multifocal abscesses and neoplasm.      4.  Large amount of subcutaneous emphysema on the right.      5.  Splenomegaly.      6.  Multiple support devices.      Fleischner Society pulmonary nodule recommendations:         DX-CHEST-LIMITED (1 VIEW)   Final Result      1.  Support devices as described above.      2.  Right chest tube present with a again seen right-sided pneumothorax, possibly increased in size and loculated.      3.  Worsening bilateral pulmonary infiltrates.      DX-ABDOMEN FOR TUBE PLACEMENT   Final Result      Cortrak feeding tube tip projects in the region of the duodenal bulb.      DX-CHEST-LIMITED (1 VIEW)   Final Result      Loculated right pneumothorax is decreased in size compared to prior.      Small left pleural effusion.      Small loculated right pleural effusion.      Extensive bilateral airspace opacities are not significantly changed.      Soft tissue air on the right is again noted.      DX-CHEST-LIMITED (1 VIEW)   Final Result      Decreased partially loculated right pleural fluid with increased pleural air. Right chest tube is in place.      Increased hazy opacity in the left lung possibly atelectasis.         US-ABDOMEN LTD (SOFT TISSUE)   Final Result      No drainable fluid collection.      DX-CHEST-PORTABLE (1 VIEW)   Final Result      Decreased partially loculated right pleural fluid with increased pleural air. Right chest tube is in place.      No other significant change.      DX-CHEST-PORTABLE (1 VIEW)   Final Result         1.  Pulmonary edema and/or infiltrates are identified, which are somewhat decreased since the prior exam.   2.  Moderate loculated appearing right pleural effusion, slightly decreased since prior      DX-ABDOMEN FOR TUBE PLACEMENT   Final Result      Feeding tube tip at the distal stomach.      DX-CHEST-PORTABLE (1 VIEW)   Final Result          1.  New chest tube is noted in the right lower hemithorax.      2.  Right pleural effusion is again identified which appears similar to the prior exam.      3.  No new infiltrates or consolidations are identified.      DX-CHEST-PORTABLE (1 VIEW)   Final Result         1.  Pulmonary edema and/or infiltrates are identified, which are stable since the prior exam.   2.  Moderate loculated appearing right pleural effusion      DX-CHEST-PORTABLE (1 VIEW)   Final Result         No significant interval change.      POCT BUTTERFLY-ECHOCARDIOGRAM LTD W/O CONT    (Results Pending)        Assessment/Plan  * Empyema of right pleural space (HCC)- (present on admission)  Assessment & Plan  Patient s/p multiple chest tubes, thoracotomy and decortication  Now s/p second decortication 5/17  Per thoracic surgery note, sutures to be removed on 6/9      Debility  Assessment & Plan  Acute on chronic, the patient likely need of LTAC treatment post acute treatment    Elevated alkaline phosphatase level- (present on admission)  Assessment & Plan  The patient not able to tolerate a MRCP at this time, ultrasound grossly unremarkable, observe    Agitation requiring sedation protocol- (present on admission)  Assessment & Plan  Monitor,    Goals of care, counseling/discussion  Assessment & Plan  Patient with overall high risk of morbidity and mortality given her gravely ill state and prior chronic medical conditions  Palliative care following    Spinal cord stimulator status  Assessment & Plan  Patient's stimulator is Nuvectra. It is FDA approved as MRI compatible, but the company has gone out of business, so there is no support team to assist with MRI.  Thus, if MRI were performed, our radiologists would need to protocol it correctly to manage the stimulator. The former rep from [employer] is Andre, [phone].  Information obtained from Dr. Mahoney's office, [phone].     Per  (5/4/2021), it is not MRI compatible  unfortunately.     Thrombocytosis (HCC)  Assessment & Plan  Acute reaction since resolved    Anasarca- (present on admission)  Assessment & Plan  Improved nutritional status, skin protective measures    Trapped lung  Assessment & Plan  Now s/p decortication   Chest tubes removed on 5/26, observe    Pulmonary abscess (HCC)  Assessment & Plan  From septic emboli  Now s/p decortication    Fever  Assessment & Plan  Resolved, monitor, antibiotic therapy    Atelectasis  Assessment & Plan  Pulmonary recruiting, respiratory care    Pneumonia  Assessment & Plan  MSSA  Ancef per ID    History of vasculitis- (present on admission)  Assessment & Plan  Does not appear to be a current issue    Bacteremia due to methicillin susceptible Staphylococcus aureus (MSSA)- (present on admission)  Assessment & Plan  Infectious disease assisting with antibiotic treatment  Multiple potential sources  Endocarditis  ID following  Continue Ancef until 6/14    CSF pleocytosis- (present on admission)  Assessment & Plan  Infectious disease managing    Acute bacterial endocarditis- (present on admission)  Assessment & Plan  Mitral valve, tricuspid valve  MSSA  Ongoing antibiotic therapy for acute infection and 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    Acute blood loss anemia  Assessment & Plan  Likely secondary to hemothorax  300 cc of blood removed after chest tube placed at [facility]  Trend hemoglobin    Prolonged Q-T interval on ECG- (present on admission)  Assessment & Plan  Resolved after correcting metabolic issues  Cont to follow   Monitor electrolytes, acidosis etc    Acute respiratory failure with hypoxia (HCC)- (present on admission)  Assessment & Plan  Secondary to MSSA bacteremia, empyema, pulmonary septic emboli  S/p tracheostomy  Ongoing respiratory care, pulmonary toilet, antibiotic treatment  Prolonged ventilator dependence  Will need LTAC  cxr showed improvement  Wean oxygen as tolerated  CPT.    History of  complicated migraines- (present on admission)  Assessment & Plan  Monitor    GERD (gastroesophageal reflux disease)- (present on admission)  Assessment & Plan  History of, as needed treatment    Hyponatremia  Assessment & Plan  Resolved  Follow daily    Tachycardia  Assessment & Plan  Persistent, likely secondary with multiple underlying ongoing conditions  Monitor  Stable    Pseudotumor cerebri  Assessment & Plan  History of  shunt, neurosurgery opted against removal    H/O: CVA (cerebrovascular accident)- (present on admission)  Assessment & Plan  Monitor    Chronic pain syndrome- (present on admission)  Assessment & Plan  Pre-existing, pain management    Port or reservoir infection  Assessment & Plan  S/p removal    Thrombocytopenia (HCC)- (present on admission)  Assessment & Plan  Transient, resolved    Anemia- (present on admission)  Assessment & Plan  Monitor, transfuse as indicated    Septic shock (HCC)- (present on admission)  Assessment & Plan  Multiple sources, recovered with aggressive treatment, IV antibiotics long-term  Resolved    Sepsis (HCC)  Assessment & Plan  Multiple sources possible, recovered with medical treatment  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 Oh felt there is currently no need to remove    Folliculitis  Assessment & Plan  Pustular rash of back and buttocks  Presenting on dependent areas of skin  Likely moisture related  Recommend out of bed  Wound care for moisture mitigation  On IV ancef and do not think that topical Abx would be  helpful  Will monitor     Acute encephalopathy- (present on admission)  Assessment & Plan  Per neurosurgery no need to remove  shunt  Improved           VTE prophylaxis: enoxaparin    Case and plan of care discussed with nurse staff during multidisciplinary rounds.    Patient is has a high medical complexity and is at high risk for complication, morbidity, and mortality.

## 2021-06-04 NOTE — WOUND TEAM
[facility]  Inpatient Services  Initial Wound and Skin Care Evaluation    Admission Date: 2021     Last order of IP CONSULT TO WOUND CARE was found on 6/3/2021 from Hospital Encounter on 2021     HPI, PMH, SH: Reviewed    Past Surgical History:   Procedure Laterality Date   • THORACOTOMY Right 2021    Procedure: THORACOTOMY;  Surgeon: John H Ganser, M.D.;  Location: [facility];  Service: General   • DECORTICATION  2021    Procedure: DECORTICATION, LUNG.;  Surgeon: John H Ganser, M.D.;  Location: [facility];  Service: General   • PB THORACOSCOPY,DX NO BX Right 2021    Procedure: THORACOSCOPY;  Surgeon: John H Ganser, M.D.;  Location: [facility];  Service: General   • DECORTICATION Right 2021    Procedure: DECORTICATION, LUNG;  Surgeon: John H Ganser, M.D.;  Location: 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   • OTHER SURGICAL PROCEDURE  11/10/2017    Power port   • LIVER BIOPSY  2017   •  SHUNT INSERTION  2017    Procedure:  SHUNT INSERTION;  Surgeon: Drew Berry M.D.;  Location: [facility];  Service:    • SPINAL CORD STIMULATOR  2016    Procedure: SPINAL CORD STIMULATOR FOR: PLACEMENT OF LEFT FLANK OCCIPITAL NERVE STIMULATOR BATTERY PACK;  Surgeon: Oli Oh III, M.D.;  Location: [facility];  Service:    • LUMBAR EXPLORATION N/A 2015    Procedure: LUMBAR EXPLORATION- Lumbar shunt removal ;  Surgeon: Oli Oh III, M.D.;  Location: [facility]  [facility];  Service:    • OTHER NEUROLOGICAL SURG  08/2015    Explanted lumbar shunt and explanted power port due to MRSA   • IRRIGATION & DEBRIDEMENT NEURO N/A 6/28/2015    Procedure: IRRIGATION & DEBRIDEMENT NEURO;  Surgeon: Oli Oh III, M.D.;  Location: [facility];  Service:    • SPINAL CORD STIMULATOR  2003    1st implant   • CHOLECYSTECTOMY  2001    had ruptured day before   • ENDOMETRIAL ABLATION  2001   • TUBAL COAGULATION LAPAROSCOPIC BILATERAL Bilateral 2001   • KNEE ARTHROSCOPY Right 1990   • TONSILLECTOMY  1985   • APPENDECTOMY  1982   • OTHER NEUROLOGICAL SURG  [id number]    occipital nerve stimulator-revisions for total of 9 procedures     Social History     Tobacco Use   • Smoking status: Never Smoker   • Smokeless tobacco: Never Used   Substance Use Topics   • Alcohol use: Not Currently     No chief complaint on file.    Diagnosis: Empyema lung (HCC) [J86.9]    Unit where seen by Wound Team: T813/01     WOUND CONSULT/FOLLOW UP RELATED TO:   toes    WOUND HISTORY:  No significant wound hx. PMH of pseudotumor cerebri s/p  shunt implantation.    WOUND ASSESSMENT/LDA        Wound 04/29/21 Other (comment) Toe, Hallux Right ischemic injury (Active)   Wound Image   06/04/21 1600   Site Assessment Black    Periwound Assessment Dry    Margins Attached edges    Closure Open to air    Drainage Amount None    Treatments Site care    Wound Cleansing Not Applicable    Dressing Cleansing/Solutions 3% Betadine    Dressing Options Open to Air    Dressing Changed New    Dressing Status Open to Air    Dressing Change/Treatment Frequency Daily, and As Needed    NEXT Dressing Change/Treatment Date 06/05/21    NEXT Weekly Photo (Inpatient Only) 06/11/21    Wound Length (cm) 1.5 cm    Wound Width (cm) 1.5 cm    Wound Surface Area (cm^2) 2.25 cm^2    Shape oval    Wound Odor None    Pulses Left;Right;2+    Exposed Structures LUCAI        Wound 04/29/21 Other (comment) Toe, 3rd Left Ischemic injury (Active)    Wound Image   06/04/21 1600   Site Assessment Brown    Periwound Assessment Dry    Margins Attached edges    Closure Open to air    Drainage Amount None    Treatments Site care    Wound Cleansing Not Applicable    Dressing Cleansing/Solutions 3% Betadine    Dressing Options Open to Air    Dressing Changed New    Dressing Status Open to Air    Dressing Change/Treatment Frequency Daily, and As Needed    NEXT Dressing Change/Treatment Date 06/05/21    NEXT Weekly Photo (Inpatient Only) 06/11/21    Wound Length (cm) 1.5 cm    Wound Width (cm) 0.3 cm    Wound Surface Area (cm^2) 0.45 cm^2    Shape linear    Wound Odor None    Pulses Left;Right;2+    Exposed Structures LUCIA             Vascular:    HARRIET:   No results found.    Lab Values:    Lab Results   Component Value Date/Time    WBC 8.3 06/02/2021 03:11 AM    RBC 3.47 (L) 06/02/2021 03:11 AM    HEMOGLOBIN 9.6 (L) 06/02/2021 03:11 AM    HEMATOCRIT 32.1 (L) 06/02/2021 03:11 AM    CREACTPROT 3.85 (H) 05/30/2021 01:20 AM    SEDRATEWES 102 (H) 05/30/2021 01:20 AM    HBA1C 5.1 03/10/2020 08:57 AM        Culture Results show:  No results found for this or any previous visit (from the past 720 hour(s)).    Pain Level/Medicated:  None       INTERVENTIONS BY WOUND TEAM:  Chart and images reviewed. Discussed with bedside RN. All areas of concern (based on picture review, LDA review and discussion with bedside RN) have been thoroughly assessed. Documentation of areas based on significant findings. This RN in to assess patient. Performed standard wound care which includes appropriate positioning, dressing removal and non-selective debridement. Pictures and measurements obtained weekly if/when required.  Preparation for Dressing removal: NA  Cleansed with: NA  Sharp debridement: NA  Dionna wound: 3% betadine  Primary Dressing: RIZWANA      Interdisciplinary consultation: Patient, Bedside RN, Kurt Wound RN    EVALUATION / RATIONALE FOR TREATMENT:  Most Recent Date:  6/4: Ischemic  injuries to bilateral toes obtained when Pt was in [facility] on vasopressors. Eschar appears to be improving, applied betadine to keep site dry and stable. Maintain RIZWANA.      Goals: Steady decrease in wound area and depth weekly.    WOUND TEAM PLAN OF CARE ([X] for frequency of wound follow up,):   Nursing to follow orders written for wound care. Contact wound team if area fails to progress, deteriorates or with any questions/concerns  Dressing changes by wound team:                   Follow up 3 times weekly:                NPWT change 3 times weekly:     Follow up 1-2 times weekly:      Follow up Bi-Monthly:  X nursing to perform skin care; WT following                  Follow up as needed:     Other (explain):     NURSING PLAN OF CARE ORDERS (X):  Dressing changes: See Dressing Care orders: x  Skin care: See Skin Care orders:   RN Prevention Protocol:   Rectal tube care: See Rectal Tube Care orders:   Other orders:    RSKIN:   CURRENTLY IN PLACE (X), APPLIED THIS VISIT (A), ORDERED (O):   Q shift Charles:  X  Q shift pressure point assessments:  X    Surface/Positioning   Pressure redistribution mattress            Low Airloss x         Bariatric foam      Bariatric RUDOLPH     Waffle cushion        Waffle Overlay          Reposition q 2 hours  x    TAPs Turning system     Z Thee Pillow     Offloading/Redistribution   Sacral Mepilex (Silicone dressing)     Heel Mepilex (Silicone dressing)  x       Heel float boots (Prevalon boot)             Float Heels off Bed with Pillows           Respiratory   Silicone O2 tubing         Gray Foam Ear protectors     Cannula fixation Device (Tender )          High flow offloading Clip    Elastic head band offloading device      Anchorfast                                                         Trach with Optifoam split foam x            Containment/Moisture Prevention     Rectal tube or BMS    Purwick/Condom Cath        Lopez Catheter    Barrier wipes O          Barrier paste        Antifungal tx    x  Interdry        Mobilization       Up to chair        Ambulate    x  PT/OT      Nutrition       Dietician        Diabetes Education      PO  x   TF     TPN     NPO   # days     Other        Anticipated discharge plans: TBD  LTACH:        SNF/Rehab:                  Home Health Care:           Outpatient Wound Center:            Self/Family Care:        Other:

## 2021-06-04 NOTE — CARE PLAN
Problem: Pain Management  Goal: Pain level will decrease to patient's comfort goal  Outcome: Progressing     Problem: Fall Risk  Goal: Patient will remain free from falls  Outcome: Progressing     Problem: Mobility  Goal: Patient's capacity to carry out activities will improve  Outcome: Progressing     Problem: Fluid Volume  Goal: Fluid volume balance will be maintained  Outcome: Progressing     The patient is Watcher - Medium risk of patient condition declining or worsening    Shift Goals  Clinical Goals: PT/OT; wound care; wean oxygen; manage secretions; maximize time in chair  Patient Goals: rest; pain relief; ambulation  Family Goals: NA

## 2021-06-04 NOTE — CARE PLAN
Problem: Mobility  Goal: Patient's capacity to carry out activities will improve  Outcome: Progressing  Note: Patient brushed her teeth today with minimal assistance from RN.      The patient is Watcher - Medium risk of patient condition declining or worsening    Shift Goals  Clinical Goals: PT/OT; wound care; wean oxygen; trach care  Patient Goals: rest; pain relief; walk  Family Goals: NA    Progress made toward(s) clinical / shift goals:  trach care completed

## 2021-06-04 NOTE — PROGRESS NOTES
12hr cc complete    Monitor Summary:  Sinus rhythm 92-97 bpm  Ectopy: Rare PVCs, rare PACs  1st degree heart block  0.12 / 0.06 / 0.35

## 2021-06-04 NOTE — DIETARY
Nutrition Services: Update   Day 49 of admit.  Enedelia Pang is a 50 y.o. female with admitting DX of Empyema lung    1. Pt is currently on level 4 - pureed diet with level 2 - mildly thick liquids, 1:1 supervision by nursing. PO: <% in past 5 days of documented intake with recent trending down in PO intake x2 days. Per RN, pt's  stated that pt has not been drinking Boost supplements due to dislike.  2. Wt 61 kg via bed scale on 6/3 - has fluctuated during admit from 59.9 kg to 84 kg. Last stand up scale wt: 63.6 kg (5/18)  Per I/Os, pt is -2.25 L since 5/21. Per previous RD notes, pt had significant anasarca and was on lasix.  3. RD briefly visited pt at bedside. Pt indicated with head nods and shakes likes vs dislikes; verbalized little during interview. Dislikes vanilla boost VHC; is agreeable to trying chocolate boost and chocolate pudding  4. MAR: valium, methadone  5. Labs: BUN 6, Crea 0.20, Alk phos 705  6. Skin: Moisture-associated skin damage on sacrum; wound team consult completed and signed off   7. Last BM: 6/3    Malnutrition Risk: Pt at risk for malnutrition if PO intake does not improve.    Recommendations/Plan:  1. Discontinue boost VHC and add chocolate boost plus and chocolate pudding per pt preference.   2. Encourage intake of meals, supplements, and snacks.  3. Document intake of all meals, supplements, and snacks as % taken in ADL's to provide interdisciplinary communication across all shifts.   4. Monitor weight.  5. Nutrition rep will continue to see patient for ongoing meal and snack preferences.    RD following.

## 2021-06-04 NOTE — THERAPY
Physical Therapy   Daily Treatment     Patient Name: Enedelia Pang  Age:  50 y.o., Sex:  female  Medical Record #: [id number]  Today's Date: 6/4/2021     Precautions: Fall Risk, Tracheostomy , Swallow Precautions ( See Comments)    Assessment    Pt pleasant, continues to make gradual progress with therapy. She is ambulating with spv and fww. She utilizes bed features due to trach and difficulty with breathing from bed flat otherwise completed with spv. Started stair training today as pt has flight of stairs at home. She required Brenda and completed 3 steps with bilateral hand rails and cues for technique. No lob but fatigued. As well provided continue learning on cervical spine positioning. She continues to demonstrate weakness and difficulty with keeping spine at neutral. Encouraged to continue to mobilize with nursing staff while here.     Plan    Continue current treatment plan.    DC Equipment Recommendations: Unable to determine at this time  Discharge Recommendations: Recommend post-acute placement for additional physical therapy services prior to discharge home         06/04/21 1031   Other Treatments   Other Treatments Provided continued to educate on cervical spine positioninig. PROM provided pt continues to have difficulty keeping at neutral    Balance   Sitting Balance (Static) Fair +   Sitting Balance (Dynamic) Fair   Standing Balance (Static) Fair   Standing Balance (Dynamic) Fair -   Weight Shift Sitting Fair   Weight Shift Standing Fair   Comments with fww    Gait Analysis   Gait Level Of Assist Supervised   Assistive Device Front Wheel Walker   Distance (Feet) 100   # of Times Distance was Traveled 1   # of Stairs Climbed 3   Level of Assist with Stairs Minimal Assist   Skilled Intervention Verbal Cuing   Comments Distance limited due to focusing on stair training today. Brenda and vc for proper technique, no lob    Bed Mobility    Supine to Sit Supervised   Sit to Supine Supervised   Scooting  Supervised   Comments hob elevated, difficult to lay flat due to breathing with trach per pt    Functional Mobility   Sit to Stand Supervised   Bed, Chair, Wheelchair Transfer Supervised   Skilled Intervention Verbal Cuing   Comments spv but continues to require cues for seqeuncing and decreased amount of steps backwards to chair. Instead walk up to chair and than proceed to turn    Short Term Goals    Short Term Goal # 1 Pt will be able to perform supine<>sit with HOB flat/no rails with Spv in 6tx to promote fnx progression towards  I    Goal Outcome # 1 Progressing as expected   Short Term Goal # 2 Pt will be able to perform sit<>stand/transfers with FWW with SPv in 6tx to promote fnx progression towards I    Goal Outcome # 2 Goal met   Short Term Goal # 3 Pt will be able to ambulate 150ft with FWW iwth SPv in 6tx to promote fnx progression towards I    Goal Outcome # 3 Goal met   Short Term Goal # 4 Pt will be able to ambulate up/down FOS with rail wtih Spv in 6tx to promote eventual dc to home plan   Goal Outcome # 4 Progressing as expected

## 2021-06-04 NOTE — CARE PLAN
Problem: Nutritional:  Goal: Achieve adequate nutritional intake  Description: Patient will consume 50% of meals  Outcome: Not Progressing   PO intake declining and pt not taking supplements. See RD note for intervention.

## 2021-06-04 NOTE — THERAPY
PT contact note    Patient Name: Endeelia Pang  Age:  50 y.o., Sex:  female  Medical Record #: [id number]  Today's Date: 6/4/2021 06/04/21 1032   Interdisciplinary Plan of Care Collaboration   IDT Collaboration with  Physical Therapist Assistant (PTA)   Collaboration Comments Pt is making steady progress, and will be up walking with RN staff. Pt likely to d/c to LTAC. Decrease frequency as pt can continue to walk with RN staff.    Session Information   Date / Session Number    (decrease to 2x/week)

## 2021-06-04 NOTE — PROGRESS NOTES
Hospital Medicine Daily Progress Note    Date of Service  6/3/2021    Chief Complaint  50 y.o. female admitted 4/16/2021 with recurrent infections    Hospital Course   This is a 50-year-old female with a past medical history of pseudotumor cerebri s/p  shunt implantation by Dr. 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  [facility] infectious disease is managing antibiotics for the patient.  Dr. Candelaria surgery removed the port on 4/12.  The patient was further identified to have endocarditis of the tricuspid valve, a lumbar puncture performed on 4/14 showed pleocytosis with negative Gram stain.  The patient suffered worsening leukocytosis and was found to have septic emboli per CT.  Initially a small pleural catheter was placed.  Significant loculations were encountered.  MSSA empyema was diagnosed.  Neurosurgery was consulted to comment on possibly removal of a  shunt, this was felt not appropriate at this time.  The patient remained on mechanical ventilation for 15 days, then a perc trach was placed.  The patient was eventually liberated from mechanical ventilation, the patient did have a right thoracoscopy and decortication of the lung performed on 4/28 by Dr. Ganser.  The patient was of identified to have multiple sources for MSSA including mitral valve, tricuspid valve vegetation, port, spinal stimulator,  shunt possibly.  Patient underwent repeat chest surgery, decortication with 2 chest tube placement on 5/18, had to be placed back on mechanical ventilation after surgery, central access was replaced on 5/19, the patient treated  in ICU with eventual chest tube removal with assistance of thoracic surgery and was deescalated in terms of respiratory support down to trach collar.  En[place] in need of extensive rehabilitation, plan for LTAC level of care on discharge.    Interval Problem Update  Patient seen and examined today.    Patient tolerating treatment and therapies.  All Data, Medication data reviewed.  Case discussed with nursing as available.  Plan of Care reviewed with patient and notified of changes.  5/26 the patient on T-piece, 30% FiO2, no increase in work of breathing, small amount of secretions, good cough, chest x-ray improving, chest surgeon removed both chest tubes, ongoing antibiotic treatment with assistance of [facility] infectious disease.  Transfer options being evaluated.  Afebrile, tachycardic with heart rate in the 1 teens, blood pressure is soft but maintaining MAP.  Laboratory data reviewed, electrolytes are balanced, no leukocytosis, improving hemoglobin, chest x-ray with stable lines and tubes, stable ill-defined bilateral pulmonary opacities, no large effusions  5/27 the patient appears better, she is ambulating, the patient was cleared for diet, the patient is awake alert and following, afebrile, blood pressure between 90 and 130, no increase in respiratory difficulty, afebrile, ongoing antibiotic treatment through 6/14  Ongoing rehab efforts, awaiting LTAC approval    5/28 patient is in bed, core track removed, patient is tolerating diet, family is at bedside, discussed with , LTAC pending, patient tachycardic any symptoms no fever, blood pressure is soft but stable.  5/29 patient in bed, no fever or chills. Tolerating diet, vs stable. On T piece o2 requirement stable.   5/30 in bed looks comfortable, not in pain, o2 requirement increasing I will ordered cxr, discussed with RT today, continue iv abx per ID.   5/31 in bed feeling ok, o2 requirements decreased to 5L, no fever or chills. Discussed with nurse  staff.   6/1 Patient reports breathing has improved. Still with some pain at thoracotomy site, otherwise appears comfortable. Continue Abx until 6/14 per ID, will need long term Abx suppression with keflex following completion of Abx.  6/2 No acute events overnight. Remains on oxygen with T piece. Reports breathing better than yesterday. Remains on Abx. Pending LTAC approval  6/3 Patient wound vac removed by wound care per thoracic surgeon orders. Worsening rash on buttocks and back, appears to be folliculitis and related to moisture. Wound care following. Awaiting LTAC approval    Consultants/Specialty  ID  Thoracic surgery  Pulmonary critical care    Code Status  Full Code    Disposition  Rehab, hopefully LTAC    Review of Systems  Review of Systems   Unable to perform ROS: Other   Poor communication secondary to tracheostomy    Physical Exam  Temp:  [36.6 °C (97.8 °F)-37.1 °C (98.8 °F)] 36.7 °C (98.1 °F)  Pulse:  [] 107  Resp:  [15-18] 16  BP: ()/(56-73) 89/63  SpO2:  [90 %-98 %] 98 %    Physical Exam  Vitals and nursing note reviewed.   Constitutional:       General: She is not in acute distress.     Appearance: She is well-developed. She is ill-appearing. She is not diaphoretic.   HENT:      Head: Normocephalic and atraumatic.   Eyes:      General: No scleral icterus.  Neck:      Vascular: No JVD.   Cardiovascular:      Rate and Rhythm: Regular rhythm. Tachycardia present.   Pulmonary:      Effort: Pulmonary effort is normal. No respiratory distress.      Breath sounds: No stridor. Rales present.      Comments: Trach without inflammation  Abdominal:      General: There is no distension.      Palpations: Abdomen is soft.      Tenderness: There is no abdominal tenderness.   Skin:     Coloration: Skin is pale.      Findings: Rash present.   Neurological:      Mental Status: She is alert and oriented to person, place, and time.      Motor: Weakness present.   Psychiatric:         Thought Content:  Thought content normal.         Fluids    Intake/Output Summary (Last 24 hours) at 6/3/2021 1807  Last data filed at 6/3/2021 1614  Gross per 24 hour   Intake --   Output 750 ml   Net -750 ml       Laboratory  Recent Labs     06/02/21  0311   WBC 8.3   RBC 3.47*   HEMOGLOBIN 9.6*   HEMATOCRIT 32.1*   MCV 92.5   MCH 27.7   MCHC 29.9*   RDW 54.2*   PLATELETCT 330   MPV 9.8     Recent Labs     06/02/21  0311   SODIUM 136   POTASSIUM 4.2   CHLORIDE 96   CO2 32   GLUCOSE 86   BUN 6*   CREATININE 0.20*   CALCIUM 9.0                   Imaging  DX-CHEST-PORTABLE (1 VIEW)   Final Result      1.  Interval removal of the right-sided chest tubes. No pneumothorax is identified.   2.  Airspace opacities in the right are mildly improved and may represent atelectasis/consolidation.   3.  There is likely a small right pleural effusion.   4.  Patchy opacities on the left are mildly improved.   5.  Interval removal of the enteric tube.      DX-CHEST-PORTABLE (1 VIEW)   Final Result         1. Stable lines and tubes.   2. Stable ill-defined bilateral pulmonary opacities, right worse than left. No large pleural effusions.         DX-CHEST-PORTABLE (1 VIEW)   Final Result      1.  Decrease in volume of right pleural fluid collection/empyema with 2 right chest tubes in place. No pneumothorax identified.      2.  No focal consolidation in the left lung.      DX-CHEST-PORTABLE (1 VIEW)   Final Result      No significant interval change.      DX-CHEST-PORTABLE (1 VIEW)   Final Result         1.  Hazy right pulmonary infiltrates and/or effusion, stable compared to prior study.      DX-CHEST-PORTABLE (1 VIEW)   Final Result         1.  Hazy right pulmonary infiltrates and/or effusion, stable compared to prior study.      DX-CHEST-PORTABLE (1 VIEW)   Final Result      1.  All lines and tubes appear appropriately located      2.  Consolidation throughout the right lung consistent with pneumonia      DX-CHEST-PORTABLE (1 VIEW)   Final Result          1.  Hazy right pulmonary infiltrates and/or effusion, increased compared to prior study.      DX-ABDOMEN FOR TUBE PLACEMENT   Final Result      Feeding tube looped in the stomach.      DX-CHEST-PORTABLE (1 VIEW)   Final Result         1. Stable lines and tubes. No pneumothorax detected.   2. Persistent opacities in the right perihilar region and in the right lung periphery, similar to prior study. Left lung is essentially clear.      DX-CHEST-PORTABLE (1 VIEW)   Final Result      Postoperative changes of the right chest with placement of an additional chest tube. There is improved aeration in the right lung. No significant pleural effusion or definite pneumothorax.      Right IJ central venous catheter tip projects over the proximal right atrium.      Hypoinflation with interstitial and hazy pulmonary opacities.      DX-CHEST-PORTABLE (1 VIEW)   Final Result         1.  Hazy right pulmonary infiltrates and/or effusion, similar compared to prior study.      DX-ABDOMEN FOR TUBE PLACEMENT   Final Result      Feeding tube tip projects over the expected location the gastric antrum.      DX-CHEST-PORTABLE (1 VIEW)   Final Result         1.  Hazy right pulmonary infiltrates and/or effusion, similar compared to prior study.      DX-CHEST-LIMITED (1 VIEW)   Final Result      1.  No pneumothorax. Only one chest tube is now seen in the right lung.   2.  The chest is otherwise stable.      DX-CHEST-PORTABLE (1 VIEW)   Final Result         1.  Hazy right pulmonary infiltrates and/or effusion, similar compared to prior study.      DX-CHEST-PORTABLE (1 VIEW)   Final Result         1.  Hazy right pulmonary infiltrates and/or effusion, similar compared to prior study.      DX-ABDOMEN FOR TUBE PLACEMENT   Final Result         1.  Nonspecific bowel gas pattern.   2.  Dobbhoff tube tip terminates overlying the expected location of the gastric antrum.   3.  Hazy bilateral lung base infiltrates, greater on the right.       DX-CHEST-PORTABLE (1 VIEW)   Final Result         1.  Hazy right pulmonary infiltrates and/or effusion, similar compared to prior study.      US-RUQ   Final Result         1.  Hepatomegaly   2.  Mild intrahepatic and extrahepatic biliary ductal dilatation, commonly associated with postcholecystectomy physiology, consider causes of biliary obstruction with additional workup as clinically appropriate      CT-CHEST,ABDOMEN,PELVIS WITH   Final Result      1.  Large empyema in the RIGHT hemithorax, slightly decreased in size from prior exam with chest tubes present.   2.  Consolidation remains.   3.  Multiple cavitary lesions again seen in the LEFT upper lobe, minimally decreased from prior, concerning for septic emboli.   4.  Supportive tubing is unchanged.   5.  Intrahepatic and extrahepatic biliary dilation, likely postoperative although distal stricture, stone or mass remain possibilities.   6.  Moderate pelvic fluid, nonspecific.   7.  No evidence of bowel obstruction or perforation.      DX-CHEST-PORTABLE (1 VIEW)   Final Result         1.  Hazy right pulmonary infiltrates and/or effusion, similar compared to prior study.      DX-CHEST-PORTABLE (1 VIEW)   Final Result         No significant interval change.            DX-CHEST-PORTABLE (1 VIEW)   Final Result         Interval removal of left central venous catheter. Interval adjustment of right PICC with tip in the SVC.         DX-ABDOMEN FOR TUBE PLACEMENT   Final Result      Feeding tube tip at the mid to distal stomach.      IR-PICC LINE PLACEMENT W/ GUIDANCE > AGE 5   Final Result                  Ultrasound-guided PICC placement performed by qualified nursing staff as    above.          DX-CHEST-PORTABLE (1 VIEW)   Final Result      1.  Stable cardiopulmonary findings.   2.  See comments above regarding the right-sided PICC position.      DX-CHEST-PORTABLE (1 VIEW)   Final Result      Stable chest findings compared to 5/5.      DX-CHEST-PORTABLE (1 VIEW)    Final Result      Stable chest findings compared with 5/3.      DX-ABDOMEN FOR TUBE PLACEMENT   Final Result         Feeding tube with tip projecting over the expected area of the stomach.      DX-CHEST-PORTABLE (1 VIEW)   Final Result      Stable chest findings compared with prior.      DX-CHEST-PORTABLE (1 VIEW)   Final Result         1. No significant interval change.      US-EXTREMITY ARTERY LOWER UNILAT RIGHT   Final Result      DX-CHEST-PORTABLE (1 VIEW)   Final Result         1. No significant interval change.      IR-PICC LINE PLACEMENT W/ GUIDANCE > AGE 5   Final Result                  Ultrasound-guided PICC placement performed by qualified nursing staff as    above.          DX-CHEST-PORTABLE (1 VIEW)   Final Result         1.  Pulmonary edema and/or infiltrates, similar to prior study.   2.  Stable opacification right lung, likely effusion. Thoracostomy tubes are in place.   3.  Multiple cavitary appearing left pulmonary lesions, see CT performed April 27, 2021 for further characterization      DX-CHEST-PORTABLE (1 VIEW)   Final Result         1.  Pulmonary edema and/or infiltrates, similar to prior study.   2.  Single opacification right lung, likely effusion. Thoracostomy tubes are in place.   3.  Multiple cavitary appearing left pulmonary lesions, see CT performed April 27, 2021 for further characterization   4.  Soft tissue gas in the right chest wall      DX-CHEST-PORTABLE (1 VIEW)   Final Result         1.  Pulmonary edema and/or infiltrates, similar to prior study.   2.  Increased opacification right lung, likely increased effusion. Thoracostomy tubes are in place.   3.  Multiple cavitary appearing left pulmonary lesions, see CT performed April 27, 2021 for further characterization   4.  Soft tissue gas in the right chest wall      DX-CHEST-PORTABLE (1 VIEW)   Final Result         1.  Pulmonary edema and/or infiltrates, similar to prior study.   2.  Right pneumothorax, stable compared to prior  study, right thoracostomy tubes remain in place   3.  Multiple cavitary appearing left pulmonary lesions, see CT performed April 27, 2021 for further characterization   4.  Soft tissue gas in the right chest wall      DX-CHEST-PORTABLE (1 VIEW)   Final Result         1.  Pulmonary edema and/or infiltrates, similar to prior study.   2.  Right pneumothorax, interval decreased compared to prior study, interval placement of right thoracostomy tubes is noted.   3.  Multiple cavitary appearing left pulmonary lesions, see CT performed yesterday for further characterization   4.  Soft tissue gas in the right chest wall      DX-CHEST-PORTABLE (1 VIEW)   Final Result         1.  Pulmonary edema and/or infiltrates, similar to prior study.   2.  Right pneumothorax, stable compared to prior study, interval removal of right thoracostomy tube is noted.   3.  Multiple cavitary appearing left pulmonary lesions, see CT performed yesterday for further characterization   4.  Soft tissue gas in the right chest wall      DX-CHEST-LIMITED (1 VIEW)   Final Result         No significant interval change.      CT-CTA CHEST PULMONARY ARTERY W/ RECONS   Final Result         No CT evidence of pulmonary embolus.      Previous right chest tube were removed.      Grossly similar in size of the loculated right hydropneumothorax but there is increased layering fluid component. Unchanged mild shift of the mediastinal to the left.      Redemonstration of a pigtail catheter in the medial left lung base. Grossly similar multiple cavitary lesions in the left lung.      US-EXTREMITY VENOUS LOWER BILAT   Final Result      POCT BUTTERFLY-DUPLEX SCAN EXTREMITY VEINS LIMITED   Final Result      DX-CHEST-PORTABLE (1 VIEW)   Final Result         1.  Pulmonary edema and/or infiltrates, similar to prior study.   2.  Right pneumothorax, somewhat decreased to prior study, interval removal of right thoracostomy tube is noted.   3.  Multiple cavitary appearing left  pulmonary lesions, see CT performed yesterday for further characterization   4.  Soft tissue gas in the right chest wall      DX-CHEST-PORTABLE (1 VIEW)   Final Result         1.  Pulmonary edema and/or infiltrates, similar to prior study.   2.  Right pneumothorax, similar to prior study with thoracostomy tube in place.   3.  Multiple cavitary appearing left pulmonary lesions, see CT performed yesterday for further characterization      CT-CHEST (THORAX) W/O   Final Result      Large loculated right hydropneumothorax with interval increase in size of the pneumothorax and pleural fluid.      Right chest tube is in the posterior right thorax.      There is mediastinal shift to the left compatible with tension.      Small pericardial effusion.      Small loculated left pleural effusion with overlying atelectasis/consolidation.   Pigtail catheter is seen at the medial left lung base. Pleural effusion has decreased in size compared to prior.      There are multiple foci of air extending from the right lung to the pleural space suspicious for a bronchopleural fistula.      Multiple cavitary lesions bilaterally with mild interval decrease of the solid component of the cavitary nodules.      Noncavitary 3.1 cm masslike density is seen at the left lung base.      Findings may be related to septic emboli, malignancy or multifocal abscesses. Short interval follow-up recommended.      Soft tissue air on the right is again seen.      Splenomegaly      Findings discussed with Leti Sun.      DX-CHEST-PORTABLE (1 VIEW)   Final Result      No significant change from prior exam.      CT-HEAD W/O   Final Result      1.  RIGHT frontal ventriculostomy catheter unchanged in position.   2.  Mild cortical atrophy.   3.  No intracranial hemorrhage.   4.  Apparent dural thickening overlying the clivus.  Consider further evaluation with contrast-enhanced MRI.      DX-CHEST-LIMITED (1 VIEW)   Final Result      Interval left basilar pigtail  catheter with diminished atelectasis, pleural fluid      Stable right hydropneumothorax with thoracostomy tube in place, considerable soft tissue gas and partial lung collapse      IR-CHEST TUBE-EMPYEMA LEFT   Final Result      1.  CT GUIDED LEFT  10 [place] PIGTAIL CHEST TUBE PLACEMENT FOR POSSIBLE EMPYEMA YIELDING OLD BLOODY FLUID.      2. A CHEST RADIOGRAPH IS FORTHCOMING.      EC-MICAH W/O CONT   Final Result      US-ABDOMEN LTD (SOFT TISSUE)   Final Result      No fluid seen surrounding the electronic implanted spinal stimulator device.      DX-CHEST-LIMITED (1 VIEW)   Final Result      1.  Large right hydropneumothorax with right-sided chest tube in place, likely stable to slightly decreased from prior study.   2.  Small left pleural effusion. Mild improved aeration in the left lung.   3.  Bilateral pulmonary opacities which could be related to combination of atelectasis, edema and/or infection..      CT-CTA HEAD WITH & W/O-POST PROCESS   Final Result      1.  Patent carotid and vertebral arteries.      2.  Again seen right frontal the jugular peritoneal shunt catheter. There is mild increase in volume of the lateral and third ventricles.      CT-CHEST (THORAX) W/O   Final Result      1.  Interval placement of a right-sided chest tube into a large loculated right-sided pleural effusion. There is now seen a large right-sided hydropneumothorax in the area that previously seen fluid. The chest tube extends into that hydropneumothorax.    There is some residual pleural fluid seen as well. A hydropneumothorax is somewhat loculated with components most prominently inferiorly and medially.      2.  New loculated left pleural effusion.      3.  Again seen multiple bilateral cavitary pulmonary masses which are more prominent than previously seen with increasing cavitation and measure up to 3 cm size. Differential diagnosis includes septic emboli, multifocal abscesses and neoplasm.      4.  Large amount of subcutaneous  emphysema on the right.      5.  Splenomegaly.      6.  Multiple support devices.      Fleischner Society pulmonary nodule recommendations:         DX-CHEST-LIMITED (1 VIEW)   Final Result      1.  Support devices as described above.      2.  Right chest tube present with a again seen right-sided pneumothorax, possibly increased in size and loculated.      3.  Worsening bilateral pulmonary infiltrates.      DX-ABDOMEN FOR TUBE PLACEMENT   Final Result      Cortrak feeding tube tip projects in the region of the duodenal bulb.      DX-CHEST-LIMITED (1 VIEW)   Final Result      Loculated right pneumothorax is decreased in size compared to prior.      Small left pleural effusion.      Small loculated right pleural effusion.      Extensive bilateral airspace opacities are not significantly changed.      Soft tissue air on the right is again noted.      DX-CHEST-LIMITED (1 VIEW)   Final Result      Decreased partially loculated right pleural fluid with increased pleural air. Right chest tube is in place.      Increased hazy opacity in the left lung possibly atelectasis.         US-ABDOMEN LTD (SOFT TISSUE)   Final Result      No drainable fluid collection.      DX-CHEST-PORTABLE (1 VIEW)   Final Result      Decreased partially loculated right pleural fluid with increased pleural air. Right chest tube is in place.      No other significant change.      DX-CHEST-PORTABLE (1 VIEW)   Final Result         1.  Pulmonary edema and/or infiltrates are identified, which are somewhat decreased since the prior exam.   2.  Moderate loculated appearing right pleural effusion, slightly decreased since prior      DX-ABDOMEN FOR TUBE PLACEMENT   Final Result      Feeding tube tip at the distal stomach.      DX-CHEST-PORTABLE (1 VIEW)   Final Result         1.  New chest tube is noted in the right lower hemithorax.      2.  Right pleural effusion is again identified which appears similar to the prior exam.      3.  No new infiltrates or  consolidations are identified.      DX-CHEST-PORTABLE (1 VIEW)   Final Result         1.  Pulmonary edema and/or infiltrates are identified, which are stable since the prior exam.   2.  Moderate loculated appearing right pleural effusion      DX-CHEST-PORTABLE (1 VIEW)   Final Result         No significant interval change.      POCT BUTTERFLY-ECHOCARDIOGRAM LTD W/O CONT    (Results Pending)        Assessment/Plan  * Empyema of right pleural space (HCC)- (present on admission)  Assessment & Plan  Patient s/p multiple chest tubes, thoracotomy and decortication  Now s/p second decortication 5/17      Debility  Assessment & Plan  Acute on chronic, the patient likely need of LTAC treatment post acute treatment    Elevated alkaline phosphatase level- (present on admission)  Assessment & Plan  The patient not able to tolerate a MRCP at this time, ultrasound grossly unremarkable, observe    Agitation requiring sedation protocol- (present on admission)  Assessment & Plan  Monitor,    Goals of care, counseling/discussion  Assessment & Plan  Patient with overall high risk of morbidity and mortality given her gravely ill state and prior chronic medical conditions  Palliative care following    Spinal cord stimulator status  Assessment & Plan  Patient's stimulator is [employer]. It is FDA approved as MRI compatible, but the company has gone out of business, so there is no support team to assist with MRI.  Thus, if MRI were performed, our radiologists would need to protocol it correctly to manage the stimulator. The former rep from [facility] is Andre, [phone].  Information obtained from Dr. Mahoney's office, [phone].     Per  (5/4/2021), it is not MRI compatible unfortunately.     Thrombocytosis (HCC)  Assessment & Plan  Acute reaction since resolved    Anasarca- (present on admission)  Assessment & Plan  Improved nutritional status, skin protective measures    Trapped lung  Assessment & Plan  Now s/p decortication    Chest tubes removed on 5/26, observe    Pulmonary abscess (HCC)  Assessment & Plan  From septic emboli  Now s/p decortication    Fever  Assessment & Plan  Resolved, monitor, antibiotic therapy    Atelectasis  Assessment & Plan  Pulmonary recruiting, respiratory care    Pneumonia  Assessment & Plan  MSSA  Ancef per ID    History of vasculitis- (present on admission)  Assessment & Plan  Does not appear to be a current issue    Bacteremia due to methicillin susceptible Staphylococcus aureus (MSSA)- (present on admission)  Assessment & Plan  Infectious disease assisting with antibiotic treatment  Multiple potential sources  Endocarditis  ID following  Continue Ancef until 6/14    CSF pleocytosis- (present on admission)  Assessment & Plan  Infectious disease managing    Acute bacterial endocarditis- (present on admission)  Assessment & Plan  Mitral valve, tricuspid valve  MSSA  Ongoing antibiotic therapy for acute infection and 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    Acute blood loss anemia  Assessment & Plan  Likely secondary to hemothorax  300 cc of blood removed after chest tube placed at [facility]  Trend hemoglobin    Prolonged Q-T interval on ECG- (present on admission)  Assessment & Plan  Resolved after correcting metabolic issues  Cont to follow   Monitor electrolytes, acidosis etc    Acute respiratory failure with hypoxia (HCC)- (present on admission)  Assessment & Plan  Secondary to MSSA bacteremia, empyema, pulmonary septic emboli  S/p tracheostomy  Ongoing respiratory care, pulmonary toilet, antibiotic treatment  Prolonged ventilator dependence  Will need LTAC  cxr showed improvement  Wean oxygen as tolerated  CPT.    History of complicated migraines- (present on admission)  Assessment & Plan  Monitor    GERD (gastroesophageal reflux disease)- (present on admission)  Assessment & Plan  History of, as needed treatment    Hyponatremia  Assessment & Plan  Resolved  Follow  daily    Tachycardia  Assessment & Plan  Persistent, likely secondary with multiple underlying ongoing conditions  Monitor  Stable    Pseudotumor cerebri  Assessment & Plan  History of  shunt, neurosurgery opted against removal    H/O: CVA (cerebrovascular accident)- (present on admission)  Assessment & Plan  Monitor    Chronic pain syndrome- (present on admission)  Assessment & Plan  Pre-existing, pain management    Port or reservoir infection  Assessment & Plan  S/p removal    Thrombocytopenia (HCC)- (present on admission)  Assessment & Plan  Transient, resolved    Anemia- (present on admission)  Assessment & Plan  Monitor, transfuse as indicated    Septic shock (HCC)- (present on admission)  Assessment & Plan  Multiple sources, recovered with aggressive treatment, IV antibiotics long-term  Resolved    Sepsis (HCC)  Assessment & Plan  Multiple sources possible, recovered with medical treatment  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 Oh felt there is currently no need to remove    Folliculitis  Assessment & Plan  Pustular rash of back and buttocks  Presenting on dependent areas of skin  Likely moisture related  Recommend out of bed  Wound care for moisture mitigation  On IV ancef and do not think that topical Abx would be helpful  Will monitor     Acute encephalopathy- (present on admission)  Assessment & Plan  Per neurosurgery no need to remove  shunt  Improving with ongoing medical treatment  Improved           VTE prophylaxis: enoxaparin    Case and plan of  care discussed with nurse staff during multidisciplinary rounds.    Patient is has a high medical complexity and is at high risk for complication, morbidity, and mortality.

## 2021-06-04 NOTE — DISCHARGE PLANNING
0952 - Attempted to contact Karla with [facility] to update on pt status that pt does not have a wound vac, no answer, left voice message.

## 2021-06-04 NOTE — PROGRESS NOTES
Infectious Disease Daily Progress Note    Date of Service  6/4/2021    Chief Complaint  Cefazolin 5/26-present.     Meropenem 5/19-5/26   Cefepime 4/23 5/18-  s/p 23 days  Cefazolin 5/18-5/19     Thoracoscopy & Decortication - 4/28  Decortication 5/17     PRIOR Rx:   Zosyn 4/22-4/23  Previous: Unasyn, Zosyn, Vanco, Daptomycin  Ceftaroline: start 4/13-4/15  Nafcillin 2g Q4 hrs 4/15-4/23 4/14: Unable to give name of previous NSG or neurologist. Seems confused, but able to say some sentences fluently.   4/15: Line cultures now growing MSSA. As well as from the blood. Repeat blood culture 4/13+ in both sets. Patient has worsening confusion. CSF fluid analyses suggest pleocytosis. Cultures are no growth from the CSF. Chest CT was ordered this morning indicating a loculated right pleural effusion as well as bilateral septic emboli. Dr. Mack spoke with Dr. Oh yesterday and no surgical intervention unless clinical deterioration.  4/16: Increased respiratory distress.  Tachypneic.  CT with loculated collection.  Dr Resendiz not available.  No thoracic surgeon available.  Plans to have pulmonary place CT.  Transferring to ICU.  4/17: Transferred to [facility] last night. Hgb dropped to 6.4 requiring PRBC transfusion. Requiring 2 pressors. Fio2 50%. Febrile 38.8. Pending OR decortication of empyema and hemothorax. Chest tube placed at [facility] shows 8,371 WBC, ,000, 74% N  4/18: Chest tube was upsized by surgery yesterday, no decortication. WBC 22k. Fio2 40%. Vasopressin. Levophed down to 2mcg. Afebrile 24 hrs. Last fever 38.6 on 4/16.   4/19: Still on vent. Levo 3 mcg, vasopressin. Afebrile 72 hours. WBC 21k. BC 4/17 NGTD x 48 hours. Unable to do MRI brain given neurostimulator per RN.   4/20: Remaining afebrile. Hb 6.2 and undergoing transfusion today.WBC 24,100, 5% bands.1700 ml CT output. Copious bloody ET secretions. No pressors. Receiving dornase and TPA per CT. Right pleural effusion not large per CXR  today.   4/21: WBC 31k. Bronchoscopy yesterday showing blood. Cultures sent. TPA on hold per RN due to arvnid blood from chest tube requiring PRBC transfusion for hgb 6. Pending chest CT today. Per , spinal stimulator is non-MRI compatible. Levophed 10mcg. 30% Fio2. Afebrile.   4/22: Fever 101.3 this am. WBC 20,300 down from 32,200 yesterday. BAL growing Klebsiella pneumoniae but susceptibility panel not set up until today. CTA of brain shows no lesion. CT of chest shows enlarging cavitary lung lesions bilat and large loculated new left pleural effusion and large hydropneumothorax on right. TPA & dornase infusions of right chest ended 4/20 after considerable bleeding requiring transfusion. Hb now down again to 6.8.  4/23: WBC 23k. Fio2 40%. Tmax 38.1. US of stiumlator shows small fluid collection but no drainable abscess. Pending MICAH today. Pending L chest tube placement  4/24: Continue fever 100.8-101.8. WBC 16,600. MICAH shows large vegetations on both tricuspid valve and mitral valve with mild TR & MR.  No aortic root abscess. Left chest tube placed yesterday yielding 8 ml of old bloody fluid. Bronch today revealed copious thick maroon secretions in distal trachea and both mainstem bronchi. On the right these were obstructive. Remaining off pressors. Last positive blood cultures (MSSA) were 4/16, negative 4/17.  4/25: Fever 100.6 this AM. wBC 13,300. Hb 6.6. CT: right hydropneumothorax increasing, right bronchopleural fistula, mediastinal shift ot the left , multiple cavitary pulmonary nodules, 3.1 cm left basilar mass, splenomegaly. CT of head shows dural thickening over the clivus. Lac+ GNR >100,000 col/ml growing from BAL of 4/24, presumed Klebsiella. Left peural fluid culture negative from 4/23.  4/26: Fever 100.4 last night. WBC 13,500. Hb 7.4. Plts 502,000. ESR >120. UA fairly clear except 30 mg% protein, 2-5 wbc and rare rbc. CXR unchanged except more prominent pulmonary edema. GNR in BAL may be a  different species of Klebsiella, and resistant to ampicillin & tmp-sulfa; confirmation pending.  4/28: Fever 100.8 last night. WBC 27,700. Hb 6.6. Plts 482,000. Creat 0.19. Kleb pneum in BAL on 4/24 is resistant to ampicillin & tmp-sulfa but otherwise sensitive. O2 sat 96% on FIO2 30% now, PEEP 8. Has been re-evaluated by thoracic surgeon, Dr. Ganser, who believes she will not wean without decortication on the right. Surgery anticipated today.  4/29: S/P right thoracoscopy with decortication with cultures NG at 24 hrs.  4/30: Had a bronch due to hypoxia and hypotension. CXR with worsening right hemithorax pulmonary opacities. Bloody mucous plugs removed. Pressors started. Febrile this am. Tachy in 130s. Pressors just removed. Tolerating vent, but not a SBT candidate at the moment.  5/1: Small air leak CT-2 every ~ 2/3 breathes. The K. pneumoniae from her thoracoscopy is sensitive to her cefepime so no antibiotic change needed.   5/2: No air leak today she tolerated 2  hrs of spontaneous breathing with support today.      5/3: Second day with SBT and doing well now for 2 hrs. Slight bump in temperature and      WBC's but no obvious clinical infectious changed so watch closely.      5/4: Fever still from time to time. WBCs trending up. Chest tubes in place. Trach.       5/5: She clearly is better today she was sitting up in bed with open eyes and follows commands. She still has low grade fever but her WBC's are dropping. Cefepime dose increased yesterday for increased CNS coverage if necessary. She will probably need further thoracic surgery as mentioned by Dr. Ganser. The issue of what to do with her stimulator is still up in the air for now as it probably will need to be removed but she is nort a candidate for more major surgery at this time.       5/7: She continues to improve and under go longer SBT trials. She just had a new PICC placed in her right arm and plan is to D/C central line.        5/8: PICC placed. No  fevers. Comfortable. Cortrak placed. Failing SBTs.  5/9: No acute issues. No fevers. Comfortable. Family at bedside.   5/10: No acute issues, fevers or WBC bumps. No changes in condition. She is to get her CT scan today and be reviewed by surgery  5/11:  at bedside.  Has been referred to LANE.  Repeat CT scan completed.  Results noted.  ? If Dr Ganser has seen.  Still with an empyema:  ? If candidate for further surgery.  5/12: Pending next thoracic surgery. No fever. Anxious.  5/13: ? Surgery date.  No one seems to know.  Had speaking valve in and having swallow eval with speech therapy  5/14: No acute issues. No fevers. Surgery Monday.  5/15: One CT accidentally pulled out yesterday.  Has been on T piece and tolerating. Plans for surgery Monday afternoon.  5/16: On schedule for surgery 5/17: 4:30 pm.  Moved to room T614.  No new issues.  5/17: Was sitting up in the bedside chair since change of shift.  Now walking in hallways with RN and CNA.  5/18: Decortication yesterday with 2 chest tube placement. WBC improving 21->14k  5/19: Hypotension and tachycardia.  Decreased H/H.  Placed back on vent to rest.  Dr Lozano in process of placing new line.  Antibiotics were deesculated yesterday to Cefazolin.  No cx were taken at surgery.  5/20: Pt was febrile yest afternoon 38.2, but now afebrile overnight after meropenem. WBC improved 11k->8k. Fio2 30%. Phenylephrine now off this morning. CXR shows new R consolidations.   5/21: Off pressors.  On vent: FiO2 30%, PEEP 8, PS 5 Received pRBCs yesterday.  5/22: Remains afebrile, off pressors. FiO2 30%.   5/23: Afebrile. FiO2 30%. Bronchoscopy yesterday normal.   5/24: No new cultures obtained at last bronchoscopy.  On  T piece this am: FiO2 30%.  5/25: Remaining afebrile. WBC 8600.   5/26: Chest tube removed today. Possible transfer to [facility].  5/27: O2 sat usuallyl 95% but intermittent desat to 87-89%.  Remaining afebrile.WBC 6100. Alk phos 641 but normal transaminases.    5/28: Pending [facility] transfer. NO new issues overnight  5/29: Awaiting bed at [facility]. Remaining afebrile. WBC 5800, creat 0.19.  5/31. Remaining afebrile. WBC 6000. . Remains weak and frail. Alk Phos 694 with normal transaminases. Slightly improved bilateral patchy infiltrates.   6/1: She is anxious to move on but still waiting for insurance approval to [facility].  6/2: Still awaiting bed at [facility]. Remaining afebrile. WBC 8300. Alk Phos 705 but transaminases normal and bilirubine 0.4.  6/4: Awaiting insurance auth before bed can be assigned at [facility]. Remaining afebrile. Extensive folliculitis over back and buttocks.  WBC 8300 on 6/2. Last  on 5/31.    Review of Systems  Review of Systems   Unable to perform ROS: Intubated   Constitutional: Negative for chills and fever.        Did walk in cao today.    HENT:        Head droop.    Respiratory: Positive for cough (associated with some substernal pain on coughing. ). Negative for sputum production, shortness of breath and wheezing.    Cardiovascular: Positive for chest pain (at CT site.  Apparently better.).   Gastrointestinal: Positive for nausea (better today.  ). Negative for abdominal pain and diarrhea.   Genitourinary: Negative for dysuria.   Musculoskeletal: Negative for myalgias.   Neurological: Negative for headaches.      Physical Exam  Temp:  [36.1 °C (97 °F)-36.8 °C (98.2 °F)] 36.6 °C (97.8 °F)  Pulse:  [] 90  Resp:  [15-20] 16  BP: ()/(53-67) 108/63  SpO2:  [90 %-99 %] 96 %    Physical Exam  Constitutional:       General: She is not in acute distress.     Comments: Weak, feeble and head drooping. Seems unable to lift head. Nevertheless the nurses have been able to get her on her feet and use walker.   HENT:      Head: Normocephalic.      Comments: Head droop.      Mouth/Throat:      Mouth: Mucous membranes are dry.   Eyes:      Conjunctiva/sclera: Conjunctivae normal.      Pupils: Pupils are equal, round, and reactive to light.   Neck:       Comments: Tracheostomy in place.   Cardiovascular:      Rate and Rhythm: Normal rate and regular rhythm.      Heart sounds: No murmur heard.     Pulmonary:      Effort: Pulmonary effort is normal.      Breath sounds: Normal breath sounds.      Comments: O2 sat 93-96% on FIO2 40% per tracheostomy.  Abdominal:      General: Abdomen is flat.      Palpations: Abdomen is soft.      Tenderness: There is no abdominal tenderness.   Musculoskeletal:         General: No swelling.   Skin:     General: Skin is warm and dry.      Findings: No rash.   Neurological:      General: No focal deficit present.      Mental Status: She is alert.      Comments: Slow responses to questions. Seems drowsy.   Psychiatric:      Comments: Makes better eye contact today.      Constitutional:       Appearance: Normal appearance. Awake and alert  HENT:      Head: Normocephalic.      Comments: NG tube  Neck:      Comments: tracheostomy  Cardiovascular:      Rate and Rhythm: Normal rate and regular rhythm.   Pulmonary:      Comments: dimninished in R base  Abdominal:      General: Abdomen is flat. Bowel sounds are normal.   Neurological:      Mental Status: She is alert and oriented to person, place, and time.     Laboratory  Recent Labs     06/02/21  0311   WBC 8.3   RBC 3.47*   HEMOGLOBIN 9.6*   HEMATOCRIT 32.1*   MCV 92.5   MCH 27.7   MCHC 29.9*   RDW 54.2*   PLATELETCT 330   MPV 9.8     Recent Labs     06/02/21  0311   SODIUM 136   POTASSIUM 4.2   CHLORIDE 96   CO2 32   GLUCOSE 86   BUN 6*   CREATININE 0.20*   CALCIUM 9.0     Impression   -Severe sepsis  -Catheter related bloodstream infection due to infected port status post removal 4/12-staph aureus  -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       - Acute left empyema s/p chest tube placement 4/23, decortication 5/17, chest tube removal 5/26 - Klebsiella  -Multiple acute  septic pulmonary emboli bilaterally  -Acute bilateral pneumonia due to above     --Pulmonary edema  -Pseudotumor cerebri with underlying  shunt: possible arachnoiditis  -Chronic migraine headaches  -History of autoimmune vasculitis  -Elevated alkaline phosphatase & bilirubin  -Acute encephalopathy due to sepsis      - anemia due to above          - acute fever on 5/19 likely from secondary VAP          - Secondary VAP R sided pneumonia          - decubitus ulcer sacral.      Plan   Tracheal aspirate recently 5/19 no growth, blood cultures 5/19 no growth. CXR shows worsening R sided infiltrates concerning for secondary infections after reviewing her CXR's between 5/18 and      5/19.  Repeated sputum cultures still reflecting her original Klebsiella empyema infection. She improved post operatively with defervescence and pressor weaning while on meropenem. Suggestive of either time, or an anaerobic infection that we weren't covering. Continued meropenem for 7 to 8 days, then switched back to cefazolin on 5/26 to complete therapy for endocarditis.  - Clearly needs aggressive physical therapy.  - Continue cefazolin 4 week target date after decortication 6/14/21  - Received Meropenem for VAP for 7 days finished 5/26  - Will need repeat Chest CT right before completion of antibiotics  - after completion of IV abx  Will need suppression therapy afterward for her HW (spinal stimulator,  shunt  targeting MSSA with Keflex, for example).    - chest tube removed 5/26  - ESR, CRP weekly Saturday  Dispo: Pt to transfer to [facility]. Micheal will follow patient there. Awaiting on bed availability     30 min spent  Case discussed with pt, RN and Dr. Augustine.

## 2021-06-04 NOTE — FLOWSHEET NOTE
Monitor Summary  Rhythm: NSR/ST  Rate:   Ectopy: R PVC, R PAC; tachy to 150s while ambulating  13 / 06 / 28

## 2021-06-05 LAB
CRP SERPL HS-MCNC: 6.54 MG/DL (ref 0–0.75)
ERYTHROCYTE [SEDIMENTATION RATE] IN BLOOD BY WESTERGREN METHOD: 53 MM/HOUR (ref 0–25)

## 2021-06-05 PROCEDURE — 700111 HCHG RX REV CODE 636 W/ 250 OVERRIDE (IP): Performed by: INTERNAL MEDICINE

## 2021-06-05 PROCEDURE — 700111 HCHG RX REV CODE 636 W/ 250 OVERRIDE (IP): Performed by: STUDENT IN AN ORGANIZED HEALTH CARE EDUCATION/TRAINING PROGRAM

## 2021-06-05 PROCEDURE — 770006 HCHG ROOM/CARE - MED/SURG/GYN SEMI*

## 2021-06-05 PROCEDURE — 94640 AIRWAY INHALATION TREATMENT: CPT

## 2021-06-05 PROCEDURE — 99233 SBSQ HOSP IP/OBS HIGH 50: CPT | Performed by: STUDENT IN AN ORGANIZED HEALTH CARE EDUCATION/TRAINING PROGRAM

## 2021-06-05 PROCEDURE — 700102 HCHG RX REV CODE 250 W/ 637 OVERRIDE(OP): Performed by: HOSPITALIST

## 2021-06-05 PROCEDURE — 85652 RBC SED RATE AUTOMATED: CPT

## 2021-06-05 PROCEDURE — A9270 NON-COVERED ITEM OR SERVICE: HCPCS | Performed by: HOSPITALIST

## 2021-06-05 PROCEDURE — 94760 N-INVAS EAR/PLS OXIMETRY 1: CPT

## 2021-06-05 PROCEDURE — 86140 C-REACTIVE PROTEIN: CPT

## 2021-06-05 RX ADMIN — DIPHENHYDRAMINE HYDROCHLORIDE 50 MG: 50 INJECTION INTRAMUSCULAR; INTRAVENOUS at 20:32

## 2021-06-05 RX ADMIN — NYSTATIN: 100000 POWDER TOPICAL at 16:35

## 2021-06-05 RX ADMIN — ACETAMINOPHEN 500 MG: 325 TABLET ORAL at 20:32

## 2021-06-05 RX ADMIN — OXYCODONE 5 MG: 5 TABLET ORAL at 14:27

## 2021-06-05 RX ADMIN — DIPHENHYDRAMINE HYDROCHLORIDE 50 MG: 50 INJECTION INTRAMUSCULAR; INTRAVENOUS at 14:27

## 2021-06-05 RX ADMIN — RISPERIDONE 2 MG: 2 TABLET ORAL at 06:12

## 2021-06-05 RX ADMIN — ACETAMINOPHEN 500 MG: 325 TABLET ORAL at 08:36

## 2021-06-05 RX ADMIN — GABAPENTIN 300 MG: 300 CAPSULE ORAL at 20:32

## 2021-06-05 RX ADMIN — CEFAZOLIN SODIUM 2 G: 2 INJECTION, SOLUTION INTRAVENOUS at 14:28

## 2021-06-05 RX ADMIN — DIVALPROEX SODIUM 500 MG: 125 CAPSULE ORAL at 20:32

## 2021-06-05 RX ADMIN — OXYCODONE 5 MG: 5 TABLET ORAL at 03:10

## 2021-06-05 RX ADMIN — DIVALPROEX SODIUM 500 MG: 125 CAPSULE ORAL at 06:13

## 2021-06-05 RX ADMIN — CEFAZOLIN SODIUM 2 G: 2 INJECTION, SOLUTION INTRAVENOUS at 06:14

## 2021-06-05 RX ADMIN — DULOXETINE HYDROCHLORIDE 60 MG: 60 CAPSULE, DELAYED RELEASE ORAL at 16:35

## 2021-06-05 RX ADMIN — RISPERIDONE 2 MG: 2 TABLET ORAL at 16:35

## 2021-06-05 RX ADMIN — DIVALPROEX SODIUM 500 MG: 125 CAPSULE ORAL at 14:27

## 2021-06-05 RX ADMIN — NYSTATIN: 100000 POWDER TOPICAL at 12:01

## 2021-06-05 RX ADMIN — ACETAMINOPHEN 500 MG: 325 TABLET ORAL at 14:27

## 2021-06-05 RX ADMIN — METHADONE HYDROCHLORIDE 30 MG: 10 TABLET ORAL at 06:12

## 2021-06-05 RX ADMIN — DULOXETINE HYDROCHLORIDE 60 MG: 60 CAPSULE, DELAYED RELEASE ORAL at 06:12

## 2021-06-05 RX ADMIN — NYSTATIN: 100000 POWDER TOPICAL at 06:18

## 2021-06-05 RX ADMIN — OXYCODONE 5 MG: 5 TABLET ORAL at 20:32

## 2021-06-05 RX ADMIN — PAROXETINE HYDROCHLORIDE 10 MG: 10 TABLET, FILM COATED ORAL at 06:12

## 2021-06-05 RX ADMIN — CEFAZOLIN SODIUM 2 G: 2 INJECTION, SOLUTION INTRAVENOUS at 21:14

## 2021-06-05 RX ADMIN — DOCUSATE SODIUM 50 MG AND SENNOSIDES 8.6 MG 2 TABLET: 8.6; 5 TABLET, FILM COATED ORAL at 06:13

## 2021-06-05 RX ADMIN — GABAPENTIN 300 MG: 300 CAPSULE ORAL at 14:28

## 2021-06-05 RX ADMIN — OXYCODONE 5 MG: 5 TABLET ORAL at 08:01

## 2021-06-05 RX ADMIN — DIPHENHYDRAMINE HYDROCHLORIDE 50 MG: 50 INJECTION INTRAMUSCULAR; INTRAVENOUS at 04:22

## 2021-06-05 RX ADMIN — GABAPENTIN 300 MG: 300 CAPSULE ORAL at 06:13

## 2021-06-05 RX ADMIN — ENOXAPARIN SODIUM 40 MG: 40 INJECTION SUBCUTANEOUS at 06:12

## 2021-06-05 ASSESSMENT — ENCOUNTER SYMPTOMS
CHILLS: 0
COUGH: 0
CONSTIPATION: 0
SHORTNESS OF BREATH: 0
NAUSEA: 0
DIARRHEA: 0
FEVER: 0
ABDOMINAL PAIN: 0
WEAKNESS: 1
VOMITING: 0

## 2021-06-05 ASSESSMENT — PAIN DESCRIPTION - PAIN TYPE
TYPE: ACUTE PAIN

## 2021-06-05 ASSESSMENT — FIBROSIS 4 INDEX: FIB4 SCORE: 0.68

## 2021-06-05 NOTE — PROGRESS NOTES
Bedside report received from ACROLINA Reyes. Call light and belongings within reach. Bed locked and in lowest position. Alarm and fall precautions in place.

## 2021-06-05 NOTE — CARE PLAN
Problem: Knowledge Deficit - Standard  Goal: Patient and family/care givers will demonstrate understanding of plan of care, disease process/condition, diagnostic tests and medications  Outcome: Progressing  Note: Plan of care discussed-pt verbalizes understanding     Problem: Fall Risk  Goal: Patient will remain free from falls  Outcome: Progressing  Note: Bed alarm on, non-skid socks in place, fall wrist band in place     Shift Goals  Clinical Goals: pain management  Patient Goals: sleep comfortably  Family Goals: NA      Patient is not progressing towards the following goals:

## 2021-06-05 NOTE — CARE PLAN
The patient is Watcher - Medium risk of patient condition declining or worsening    Shift Goals  Clinical Goals: pain management  Patient Goals: sleep comfortably  Family Goals: NA    Progress made toward(s) clinical / shift goals:    Skin inspected to ensure it remains clean and dry. Q2 turns. Barrier cream applied.    Patient is not progressing towards the following goals:      Problem: Pain Management  Goal: Pain level will decrease to patient's comfort goal  Outcome: Progressing     Problem: Knowledge Deficit - Standard  Goal: Patient and family/care givers will demonstrate understanding of plan of care, disease process/condition, diagnostic tests and medications  Outcome: Progressing     Problem: Fall Risk  Goal: Patient will remain free from falls  Outcome: Progressing     Problem: Mobility  Goal: Patient's capacity to carry out activities will improve  Outcome: Progressing     Problem: Hemodynamics  Goal: Patient's hemodynamics, fluid balance and neurologic status will be stable or improve  Outcome: Progressing     Problem: Fluid Volume  Goal: Fluid volume balance will be maintained  Outcome: Progressing     Problem: Urinary - Renal Perfusion  Goal: Ability to achieve and maintain adequate renal perfusion and functioning will improve  Outcome: Progressing     Problem: Respiratory  Goal: Patient will achieve/maintain optimum respiratory ventilation and gas exchange  Outcome: Progressing     Problem: Physical Regulation  Goal: Diagnostic test results will improve  Outcome: Progressing  Goal: Signs and symptoms of infection will decrease  Outcome: Progressing

## 2021-06-05 NOTE — FLOWSHEET NOTE
Assumed care of patient at bedside report from DOM Schmidt RN. Updated on POC. Patient currently A & O x 4; on 10 L O2 T-piece; up sba w/walker; No complaints of acute pain. Call light within reach. Whiteboard updated. Fall precautions in place. Bed locked and in lowest position. All questions answered. No other needs indicated at this time.

## 2021-06-05 NOTE — THERAPY
Speech Language Pathology  Daily Treatment     Patient Name: Enedelia Pang  Age:  50 y.o., Sex:  female  Medical Record #: [id number]  Today's Date: 6/4/2021     Precautions  Precautions: (P) Fall Risk, Tracheostomy , Swallow Precautions ( See Comments)  Comments: wound vac removed     Assessment    Pt was seen for dysphagia and voice tx. Speaking valve was placed with pt able to achieve adequate intensity at the sentence level with min cues for breath support techniques. Pt completed laryngeal elevation and pharyngeal constriction exercises with min-mod cues. Pt consumed tx trials of minced/moist solids with cough x1 following a liquid rinse of mildly thick liquids. Pt continues to demonstrate reduced endurance for chewable solids; purees are still more appropriate to reduce risk of aspiration. Recommend continuing purees/mildly thick liquids with 1:1 feeding. Float pills whole in puree. Speaking valve must be on for all PO intake.     Plan    Purees/mildly thick liquids with 1:1 feeding.   Float pills whole in puree.   Speaking valve must be on for all PO intake.       Continue current treatment plan.    Discharge Recommendations: (P) Recommend post-acute placement for additional speech therapy services prior to discharge home      Objective     06/04/21 1720   Speech / Dysarthria   Intensity / Loudness Training Minimal (4)   Respiration Control Moderate (3)   Dysphagia    Positioning / Behavior Modification Modulate Rate or Bite Size;Self Monitoring   Oral / Pharyngeal / Laryngeal Exercises Laryngeal Exercises;Pharyngeal Constriction Exercises   Other Treatments tx trials of MM5, prescribed MT2 via cup   Diet / Liquid Recommendation Puree (4);Mildly Thick (2) - (Nectar Thick)   Short Term Goals   Short Term Goal # 1 The patient will tolerate the speaking valve while awake with no s/sx of emotional or respiratory distress   Goal Outcome # 1 Progressing as expected   Short Term Goal # 3 New 5/31: Pt will consume  pureed solids/mildly thick liquids with 1:1 supervision/feeding and no s/sx of aspiration.   Goal Outcome  # 3 Progressing as expected

## 2021-06-05 NOTE — PROGRESS NOTES
Hospital Medicine Daily Progress Note    Date of Service  6/5/2021    Chief Complaint  50 y.o. female admitted 4/16/2021 with recurrent infections    Hospital Course   This is a 50-year-old female with a past medical history of pseudotumor cerebri s/p  shunt implantation by Dr. 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  [facility] infectious disease is managing antibiotics for the patient.  Dr. Candelaria surgery removed the port on 4/12.  The patient was further identified to have endocarditis of the tricuspid valve, a lumbar puncture performed on 4/14 showed pleocytosis with negative Gram stain.  The patient suffered worsening leukocytosis and was found to have septic emboli per CT.  Initially a small pleural catheter was placed.  Significant loculations were encountered.  MSSA empyema was diagnosed.  Neurosurgery was consulted to comment on possibly removal of a  shunt, this was felt not appropriate at this time.  The patient remained on mechanical ventilation for 15 days, then a perc trach was placed.  The patient was eventually liberated from mechanical ventilation, the patient did have a right thoracoscopy and decortication of the lung performed on 4/28 by Dr. Ganser.  The patient was of identified to have multiple sources for MSSA including mitral valve, tricuspid valve vegetation, port, spinal stimulator,  shunt possibly.  Patient underwent repeat chest surgery, decortication with 2 chest tube placement on 5/18, had to be placed back on mechanical ventilation after surgery, central access was replaced on 5/19, the patient treated  in ICU with eventual chest tube removal with assistance of thoracic surgery and was deescalated in terms of respiratory support down to trach collar.  In need of extensive rehabilitation, plan for LTAC level of care on discharge.    Interval Problem Update  Patient seen and examined today.    Patient tolerating treatment and therapies.  All Data, Medication data reviewed.  Case discussed with nursing as available.  Plan of Care reviewed with patient and notified of changes.  5/26 the patient on T-piece, 30% FiO2, no increase in work of breathing, small amount of secretions, good cough, chest x-ray improving, chest surgeon removed both chest tubes, ongoing antibiotic treatment with assistance of [facility] infectious disease.  Transfer options being evaluated.  Afebrile, tachycardic with heart rate in the 1 teens, blood pressure is soft but maintaining MAP.  Laboratory data reviewed, electrolytes are balanced, no leukocytosis, improving hemoglobin, chest x-ray with stable lines and tubes, stable ill-defined bilateral pulmonary opacities, no large effusions  5/27 the patient appears better, she is ambulating, the patient was cleared for diet, the patient is awake alert and following, afebrile, blood pressure between 90 and 130, no increase in respiratory difficulty, afebrile, ongoing antibiotic treatment through 6/14  Ongoing rehab efforts, awaiting LTAC approval    5/28 patient is in bed, core track removed, patient is tolerating diet, family is at bedside, discussed with , LTAC pending, patient tachycardic any symptoms no fever, blood pressure is soft but stable.  5/29 patient in bed, no fever or chills. Tolerating diet, vs stable. On T piece o2 requirement stable.   5/30 in bed looks comfortable, not in pain, o2 requirement increasing I will ordered cxr, discussed with RT today, continue iv abx per ID.   5/31 in bed feeling ok, o2 requirements decreased to 5L, no fever or chills. Discussed with nurse  staff.   6/1 Patient reports breathing has improved. Still with some pain at thoracotomy site, otherwise appears comfortable. Continue Abx until 6/14 per ID, will need long term Abx suppression with keflex following completion of Abx.  6/2 No acute events overnight. Remains on oxygen with T piece. Reports breathing better than yesterday. Remains on Abx. Pending LTAC approval  6/3 Patient wound vac removed by wound care per thoracic surgeon orders. Worsening rash on buttocks and back, appears to be folliculitis and related to moisture. Wound care following. Awaiting LTAC approval  6/4 Patient reports improvement in pruritis with IV benadryl. She reports improvement in strength and was out of bed. Per thoracic surgery note, sutures can be removed 6/9. Waiting on LTAC approval.  6/5 No acute events overnight, some minor desaturations on 4L oxygen and was put back to 5L by T tube. Patient wants to get out of bed, informed nurse out of bed as tolerated.    Consultants/Specialty  ID  Thoracic surgery  Pulmonary critical care    Code Status  Full Code    Disposition  Rehab, hopefully LTAC    Review of Systems  Review of Systems   Unable to perform ROS: Other   Poor communication secondary to tracheostomy    Physical Exam  Temp:  [36.2 °C (97.2 °F)-36.9 °C (98.4 °F)] 36.9 °C (98.4 °F)  Pulse:  [] 92  Resp:  [16-20] 18  BP: ()/(50-63) 96/51  SpO2:  [94 %-98 %] 95 %    Physical Exam  Vitals and nursing note reviewed.   Constitutional:       General: She is not in acute distress.     Appearance: She is well-developed. She is ill-appearing. She is not diaphoretic.   HENT:      Head: Normocephalic and atraumatic.   Eyes:      General: No scleral icterus.  Neck:      Vascular: No JVD.   Cardiovascular:      Rate and Rhythm: Regular rhythm. Tachycardia present.   Pulmonary:      Effort: Pulmonary effort is normal. No respiratory distress.      Breath sounds: No stridor. Rales present.      Comments: Trach without  inflammation  Abdominal:      General: There is no distension.      Palpations: Abdomen is soft.      Tenderness: There is no abdominal tenderness.   Skin:     Coloration: Skin is pale.      Findings: Rash present.   Neurological:      Mental Status: She is alert and oriented to person, place, and time.      Motor: Weakness present.   Psychiatric:         Thought Content: Thought content normal.         Fluids    Intake/Output Summary (Last 24 hours) at 6/5/2021 1427  Last data filed at 6/5/2021 0549  Gross per 24 hour   Intake 60 ml   Output 400 ml   Net -340 ml       Laboratory                        Imaging  DX-CHEST-PORTABLE (1 VIEW)   Final Result      1.  Interval removal of the right-sided chest tubes. No pneumothorax is identified.   2.  Airspace opacities in the right are mildly improved and may represent atelectasis/consolidation.   3.  There is likely a small right pleural effusion.   4.  Patchy opacities on the left are mildly improved.   5.  Interval removal of the enteric tube.      DX-CHEST-PORTABLE (1 VIEW)   Final Result         1. Stable lines and tubes.   2. Stable ill-defined bilateral pulmonary opacities, right worse than left. No large pleural effusions.         DX-CHEST-PORTABLE (1 VIEW)   Final Result      1.  Decrease in volume of right pleural fluid collection/empyema with 2 right chest tubes in place. No pneumothorax identified.      2.  No focal consolidation in the left lung.      DX-CHEST-PORTABLE (1 VIEW)   Final Result      No significant interval change.      DX-CHEST-PORTABLE (1 VIEW)   Final Result         1.  Hazy right pulmonary infiltrates and/or effusion, stable compared to prior study.      DX-CHEST-PORTABLE (1 VIEW)   Final Result         1.  Hazy right pulmonary infiltrates and/or effusion, stable compared to prior study.      DX-CHEST-PORTABLE (1 VIEW)   Final Result      1.  All lines and tubes appear appropriately located      2.  Consolidation throughout the right lung  consistent with pneumonia      DX-CHEST-PORTABLE (1 VIEW)   Final Result         1.  Hazy right pulmonary infiltrates and/or effusion, increased compared to prior study.      DX-ABDOMEN FOR TUBE PLACEMENT   Final Result      Feeding tube looped in the stomach.      DX-CHEST-PORTABLE (1 VIEW)   Final Result         1. Stable lines and tubes. No pneumothorax detected.   2. Persistent opacities in the right perihilar region and in the right lung periphery, similar to prior study. Left lung is essentially clear.      DX-CHEST-PORTABLE (1 VIEW)   Final Result      Postoperative changes of the right chest with placement of an additional chest tube. There is improved aeration in the right lung. No significant pleural effusion or definite pneumothorax.      Right IJ central venous catheter tip projects over the proximal right atrium.      Hypoinflation with interstitial and hazy pulmonary opacities.      DX-CHEST-PORTABLE (1 VIEW)   Final Result         1.  Hazy right pulmonary infiltrates and/or effusion, similar compared to prior study.      DX-ABDOMEN FOR TUBE PLACEMENT   Final Result      Feeding tube tip projects over the expected location the gastric antrum.      DX-CHEST-PORTABLE (1 VIEW)   Final Result         1.  Hazy right pulmonary infiltrates and/or effusion, similar compared to prior study.      DX-CHEST-LIMITED (1 VIEW)   Final Result      1.  No pneumothorax. Only one chest tube is now seen in the right lung.   2.  The chest is otherwise stable.      DX-CHEST-PORTABLE (1 VIEW)   Final Result         1.  Hazy right pulmonary infiltrates and/or effusion, similar compared to prior study.      DX-CHEST-PORTABLE (1 VIEW)   Final Result         1.  Hazy right pulmonary infiltrates and/or effusion, similar compared to prior study.      DX-ABDOMEN FOR TUBE PLACEMENT   Final Result         1.  Nonspecific bowel gas pattern.   2.  Dobbhoff tube tip terminates overlying the expected location of the gastric antrum.   3.   Hazy bilateral lung base infiltrates, greater on the right.      DX-CHEST-PORTABLE (1 VIEW)   Final Result         1.  Hazy right pulmonary infiltrates and/or effusion, similar compared to prior study.      US-RUQ   Final Result         1.  Hepatomegaly   2.  Mild intrahepatic and extrahepatic biliary ductal dilatation, commonly associated with postcholecystectomy physiology, consider causes of biliary obstruction with additional workup as clinically appropriate      CT-CHEST,ABDOMEN,PELVIS WITH   Final Result      1.  Large empyema in the RIGHT hemithorax, slightly decreased in size from prior exam with chest tubes present.   2.  Consolidation remains.   3.  Multiple cavitary lesions again seen in the LEFT upper lobe, minimally decreased from prior, concerning for septic emboli.   4.  Supportive tubing is unchanged.   5.  Intrahepatic and extrahepatic biliary dilation, likely postoperative although distal stricture, stone or mass remain possibilities.   6.  Moderate pelvic fluid, nonspecific.   7.  No evidence of bowel obstruction or perforation.      DX-CHEST-PORTABLE (1 VIEW)   Final Result         1.  Hazy right pulmonary infiltrates and/or effusion, similar compared to prior study.      DX-CHEST-PORTABLE (1 VIEW)   Final Result         No significant interval change.            DX-CHEST-PORTABLE (1 VIEW)   Final Result         Interval removal of left central venous catheter. Interval adjustment of right PICC with tip in the SVC.         DX-ABDOMEN FOR TUBE PLACEMENT   Final Result      Feeding tube tip at the mid to distal stomach.      IR-PICC LINE PLACEMENT W/ GUIDANCE > AGE 5   Final Result                  Ultrasound-guided PICC placement performed by qualified nursing staff as    above.          DX-CHEST-PORTABLE (1 VIEW)   Final Result      1.  Stable cardiopulmonary findings.   2.  See comments above regarding the right-sided PICC position.      DX-CHEST-PORTABLE (1 VIEW)   Final Result      Stable  chest findings compared to 5/5.      DX-CHEST-PORTABLE (1 VIEW)   Final Result      Stable chest findings compared with 5/3.      DX-ABDOMEN FOR TUBE PLACEMENT   Final Result         Feeding tube with tip projecting over the expected area of the stomach.      DX-CHEST-PORTABLE (1 VIEW)   Final Result      Stable chest findings compared with prior.      DX-CHEST-PORTABLE (1 VIEW)   Final Result         1. No significant interval change.      US-EXTREMITY ARTERY LOWER UNILAT RIGHT   Final Result      DX-CHEST-PORTABLE (1 VIEW)   Final Result         1. No significant interval change.      IR-PICC LINE PLACEMENT W/ GUIDANCE > AGE 5   Final Result                  Ultrasound-guided PICC placement performed by qualified nursing staff as    above.          DX-CHEST-PORTABLE (1 VIEW)   Final Result         1.  Pulmonary edema and/or infiltrates, similar to prior study.   2.  Stable opacification right lung, likely effusion. Thoracostomy tubes are in place.   3.  Multiple cavitary appearing left pulmonary lesions, see CT performed April 27, 2021 for further characterization      DX-CHEST-PORTABLE (1 VIEW)   Final Result         1.  Pulmonary edema and/or infiltrates, similar to prior study.   2.  Single opacification right lung, likely effusion. Thoracostomy tubes are in place.   3.  Multiple cavitary appearing left pulmonary lesions, see CT performed April 27, 2021 for further characterization   4.  Soft tissue gas in the right chest wall      DX-CHEST-PORTABLE (1 VIEW)   Final Result         1.  Pulmonary edema and/or infiltrates, similar to prior study.   2.  Increased opacification right lung, likely increased effusion. Thoracostomy tubes are in place.   3.  Multiple cavitary appearing left pulmonary lesions, see CT performed April 27, 2021 for further characterization   4.  Soft tissue gas in the right chest wall      DX-CHEST-PORTABLE (1 VIEW)   Final Result         1.  Pulmonary edema and/or infiltrates, similar to  prior study.   2.  Right pneumothorax, stable compared to prior study, right thoracostomy tubes remain in place   3.  Multiple cavitary appearing left pulmonary lesions, see CT performed April 27, 2021 for further characterization   4.  Soft tissue gas in the right chest wall      DX-CHEST-PORTABLE (1 VIEW)   Final Result         1.  Pulmonary edema and/or infiltrates, similar to prior study.   2.  Right pneumothorax, interval decreased compared to prior study, interval placement of right thoracostomy tubes is noted.   3.  Multiple cavitary appearing left pulmonary lesions, see CT performed yesterday for further characterization   4.  Soft tissue gas in the right chest wall      DX-CHEST-PORTABLE (1 VIEW)   Final Result         1.  Pulmonary edema and/or infiltrates, similar to prior study.   2.  Right pneumothorax, stable compared to prior study, interval removal of right thoracostomy tube is noted.   3.  Multiple cavitary appearing left pulmonary lesions, see CT performed yesterday for further characterization   4.  Soft tissue gas in the right chest wall      DX-CHEST-LIMITED (1 VIEW)   Final Result         No significant interval change.      CT-CTA CHEST PULMONARY ARTERY W/ RECONS   Final Result         No CT evidence of pulmonary embolus.      Previous right chest tube were removed.      Grossly similar in size of the loculated right hydropneumothorax but there is increased layering fluid component. Unchanged mild shift of the mediastinal to the left.      Redemonstration of a pigtail catheter in the medial left lung base. Grossly similar multiple cavitary lesions in the left lung.      US-EXTREMITY VENOUS LOWER BILAT   Final Result      POCT BUTTERFLY-DUPLEX SCAN EXTREMITY VEINS LIMITED   Final Result      DX-CHEST-PORTABLE (1 VIEW)   Final Result         1.  Pulmonary edema and/or infiltrates, similar to prior study.   2.  Right pneumothorax, somewhat decreased to prior study, interval removal of right  thoracostomy tube is noted.   3.  Multiple cavitary appearing left pulmonary lesions, see CT performed yesterday for further characterization   4.  Soft tissue gas in the right chest wall      DX-CHEST-PORTABLE (1 VIEW)   Final Result         1.  Pulmonary edema and/or infiltrates, similar to prior study.   2.  Right pneumothorax, similar to prior study with thoracostomy tube in place.   3.  Multiple cavitary appearing left pulmonary lesions, see CT performed yesterday for further characterization      CT-CHEST (THORAX) W/O   Final Result      Large loculated right hydropneumothorax with interval increase in size of the pneumothorax and pleural fluid.      Right chest tube is in the posterior right thorax.      There is mediastinal shift to the left compatible with tension.      Small pericardial effusion.      Small loculated left pleural effusion with overlying atelectasis/consolidation.   Pigtail catheter is seen at the medial left lung base. Pleural effusion has decreased in size compared to prior.      There are multiple foci of air extending from the right lung to the pleural space suspicious for a bronchopleural fistula.      Multiple cavitary lesions bilaterally with mild interval decrease of the solid component of the cavitary nodules.      Noncavitary 3.1 cm masslike density is seen at the left lung base.      Findings may be related to septic emboli, malignancy or multifocal abscesses. Short interval follow-up recommended.      Soft tissue air on the right is again seen.      Splenomegaly      Findings discussed with Leti Sun.      DX-CHEST-PORTABLE (1 VIEW)   Final Result      No significant change from prior exam.      CT-HEAD W/O   Final Result      1.  RIGHT frontal ventriculostomy catheter unchanged in position.   2.  Mild cortical atrophy.   3.  No intracranial hemorrhage.   4.  Apparent dural thickening overlying the clivus.  Consider further evaluation with contrast-enhanced MRI.       DX-CHEST-LIMITED (1 VIEW)   Final Result      Interval left basilar pigtail catheter with diminished atelectasis, pleural fluid      Stable right hydropneumothorax with thoracostomy tube in place, considerable soft tissue gas and partial lung collapse      IR-CHEST TUBE-EMPYEMA LEFT   Final Result      1.  CT GUIDED LEFT  10 [place] PIGTAIL CHEST TUBE PLACEMENT FOR POSSIBLE EMPYEMA YIELDING OLD BLOODY FLUID.      2. A CHEST RADIOGRAPH IS FORTHCOMING.      EC-MICAH W/O CONT   Final Result      US-ABDOMEN LTD (SOFT TISSUE)   Final Result      No fluid seen surrounding the electronic implanted spinal stimulator device.      DX-CHEST-LIMITED (1 VIEW)   Final Result      1.  Large right hydropneumothorax with right-sided chest tube in place, likely stable to slightly decreased from prior study.   2.  Small left pleural effusion. Mild improved aeration in the left lung.   3.  Bilateral pulmonary opacities which could be related to combination of atelectasis, edema and/or infection..      CT-CTA HEAD WITH & W/O-POST PROCESS   Final Result      1.  Patent carotid and vertebral arteries.      2.  Again seen right frontal the jugular peritoneal shunt catheter. There is mild increase in volume of the lateral and third ventricles.      CT-CHEST (THORAX) W/O   Final Result      1.  Interval placement of a right-sided chest tube into a large loculated right-sided pleural effusion. There is now seen a large right-sided hydropneumothorax in the area that previously seen fluid. The chest tube extends into that hydropneumothorax.    There is some residual pleural fluid seen as well. A hydropneumothorax is somewhat loculated with components most prominently inferiorly and medially.      2.  New loculated left pleural effusion.      3.  Again seen multiple bilateral cavitary pulmonary masses which are more prominent than previously seen with increasing cavitation and measure up to 3 cm size. Differential diagnosis includes septic  emboli, multifocal abscesses and neoplasm.      4.  Large amount of subcutaneous emphysema on the right.      5.  Splenomegaly.      6.  Multiple support devices.      Fleischner Society pulmonary nodule recommendations:         DX-CHEST-LIMITED (1 VIEW)   Final Result      1.  Support devices as described above.      2.  Right chest tube present with a again seen right-sided pneumothorax, possibly increased in size and loculated.      3.  Worsening bilateral pulmonary infiltrates.      DX-ABDOMEN FOR TUBE PLACEMENT   Final Result      Cortrak feeding tube tip projects in the region of the duodenal bulb.      DX-CHEST-LIMITED (1 VIEW)   Final Result      Loculated right pneumothorax is decreased in size compared to prior.      Small left pleural effusion.      Small loculated right pleural effusion.      Extensive bilateral airspace opacities are not significantly changed.      Soft tissue air on the right is again noted.      DX-CHEST-LIMITED (1 VIEW)   Final Result      Decreased partially loculated right pleural fluid with increased pleural air. Right chest tube is in place.      Increased hazy opacity in the left lung possibly atelectasis.         US-ABDOMEN LTD (SOFT TISSUE)   Final Result      No drainable fluid collection.      DX-CHEST-PORTABLE (1 VIEW)   Final Result      Decreased partially loculated right pleural fluid with increased pleural air. Right chest tube is in place.      No other significant change.      DX-CHEST-PORTABLE (1 VIEW)   Final Result         1.  Pulmonary edema and/or infiltrates are identified, which are somewhat decreased since the prior exam.   2.  Moderate loculated appearing right pleural effusion, slightly decreased since prior      DX-ABDOMEN FOR TUBE PLACEMENT   Final Result      Feeding tube tip at the distal stomach.      DX-CHEST-PORTABLE (1 VIEW)   Final Result         1.  New chest tube is noted in the right lower hemithorax.      2.  Right pleural effusion is again  identified which appears similar to the prior exam.      3.  No new infiltrates or consolidations are identified.      DX-CHEST-PORTABLE (1 VIEW)   Final Result         1.  Pulmonary edema and/or infiltrates are identified, which are stable since the prior exam.   2.  Moderate loculated appearing right pleural effusion      DX-CHEST-PORTABLE (1 VIEW)   Final Result         No significant interval change.      POCT BUTTERFLY-ECHOCARDIOGRAM LTD W/O CONT    (Results Pending)        Assessment/Plan  * Empyema of right pleural space (HCC)- (present on admission)  Assessment & Plan  Patient s/p multiple chest tubes, thoracotomy and decortication  Now s/p second decortication 5/17  Per thoracic surgery note, sutures to be removed on 6/9      Debility  Assessment & Plan  Acute on chronic, the patient likely need of LTAC treatment post acute treatment    Elevated alkaline phosphatase level- (present on admission)  Assessment & Plan  The patient not able to tolerate a MRCP at this time, ultrasound grossly unremarkable, observe    Agitation requiring sedation protocol- (present on admission)  Assessment & Plan  Monitor,    Goals of care, counseling/discussion  Assessment & Plan  Patient with overall high risk of morbidity and mortality given her gravely ill state and prior chronic medical conditions  Palliative care following    Spinal cord stimulator status  Assessment & Plan  Patient's stimulator is [employer]. It is FDA approved as MRI compatible, but the company has gone out of business, so there is no support team to assist with MRI.  Thus, if MRI were performed, our radiologists would need to protocol it correctly to manage the stimulator. The former rep from [employer] is Andre, [phone].  Information obtained from Dr. Mahoney's office, [phone].     Per  (5/4/2021), it is not MRI compatible unfortunately.     Thrombocytosis (HCC)  Assessment & Plan  Acute reaction since resolved    Anasarca- (present on  admission)  Assessment & Plan  Improved nutritional status, skin protective measures    Trapped lung  Assessment & Plan  Now s/p decortication   Chest tubes removed on 5/26, observe    Pulmonary abscess (HCC)  Assessment & Plan  From septic emboli  Now s/p decortication    Fever  Assessment & Plan  Resolved, monitor, antibiotic therapy    Atelectasis  Assessment & Plan  Pulmonary recruiting, respiratory care    Pneumonia  Assessment & Plan  MSSA  Ancef per ID    History of vasculitis- (present on admission)  Assessment & Plan  Does not appear to be a current issue    Bacteremia due to methicillin susceptible Staphylococcus aureus (MSSA)- (present on admission)  Assessment & Plan  Infectious disease assisting with antibiotic treatment  Multiple potential sources  Endocarditis  ID following  Continue Ancef until 6/14    CSF pleocytosis- (present on admission)  Assessment & Plan  Infectious disease managing    Acute bacterial endocarditis- (present on admission)  Assessment & Plan  Mitral valve, tricuspid valve  MSSA  Ongoing antibiotic therapy for acute infection and 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    Acute blood loss anemia  Assessment & Plan  Likely secondary to hemothorax  300 cc of blood removed after chest tube placed at [facility]  Trend hemoglobin    Prolonged Q-T interval on ECG- (present on admission)  Assessment & Plan  Resolved after correcting metabolic issues  Cont to follow   Monitor electrolytes, acidosis etc    Acute respiratory failure with hypoxia (HCC)- (present on admission)  Assessment & Plan  Secondary to MSSA bacteremia, empyema, pulmonary septic emboli  S/p tracheostomy  Ongoing respiratory care, pulmonary toilet, antibiotic treatment  Prolonged ventilator dependence  Will need LTAC  cxr showed improvement  Wean oxygen as tolerated  CPT.    History of complicated migraines- (present on admission)  Assessment & Plan  Monitor    GERD (gastroesophageal reflux disease)-  (present on admission)  Assessment & Plan  History of, as needed treatment    Hyponatremia  Assessment & Plan  Resolved  Follow daily    Tachycardia  Assessment & Plan  Persistent, likely secondary with multiple underlying ongoing conditions  Monitor  Stable    Pseudotumor cerebri  Assessment & Plan  History of  shunt, neurosurgery opted against removal    H/O: CVA (cerebrovascular accident)- (present on admission)  Assessment & Plan  Monitor    Chronic pain syndrome- (present on admission)  Assessment & Plan  Pre-existing, pain management    Port or reservoir infection  Assessment & Plan  S/p removal    Thrombocytopenia (HCC)- (present on admission)  Assessment & Plan  Transient, resolved    Anemia- (present on admission)  Assessment & Plan  Monitor, transfuse as indicated    Septic shock (HCC)- (present on admission)  Assessment & Plan  Multiple sources, recovered with aggressive treatment, IV antibiotics long-term  Resolved    Sepsis (HCC)  Assessment & Plan  Multiple sources possible, recovered with medical treatment  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 Oh felt there is currently no need to remove    Folliculitis  Assessment & Plan  Pustular rash of back and buttocks  Presenting on dependent areas of skin  Likely moisture related  Recommend out of bed  Wound care for moisture mitigation  On IV ancef and do not think that topical Abx would be helpful  Will monitor     Acute encephalopathy- (present on admission)  Assessment & Plan  Per neurosurgery no need  to remove  shunt  Improved           VTE prophylaxis: enoxaparin    Case and plan of care discussed with nurse staff during multidisciplinary rounds.    Patient is has a high medical complexity and is at high risk for complication, morbidity, and mortality.

## 2021-06-05 NOTE — PROGRESS NOTES
2 RN SKIN CHECK    Back: Rash    Sacrum: Rash and excoriation    R 1st toe: DTI    L 3rd toe: DTI    R chest: former chest tube site

## 2021-06-06 LAB
ALBUMIN SERPL BCP-MCNC: 2.7 G/DL (ref 3.2–4.9)
ALBUMIN/GLOB SERPL: 0.6 G/DL
ALP SERPL-CCNC: 473 U/L (ref 30–99)
ALT SERPL-CCNC: 5 U/L (ref 2–50)
ANION GAP SERPL CALC-SCNC: 8 MMOL/L (ref 7–16)
AST SERPL-CCNC: 7 U/L (ref 12–45)
BILIRUB SERPL-MCNC: 0.3 MG/DL (ref 0.1–1.5)
BUN SERPL-MCNC: 6 MG/DL (ref 8–22)
CALCIUM SERPL-MCNC: 8.9 MG/DL (ref 8.5–10.5)
CHLORIDE SERPL-SCNC: 97 MMOL/L (ref 96–112)
CO2 SERPL-SCNC: 29 MMOL/L (ref 20–33)
CREAT SERPL-MCNC: 0.22 MG/DL (ref 0.5–1.4)
ERYTHROCYTE [DISTWIDTH] IN BLOOD BY AUTOMATED COUNT: 52.2 FL (ref 35.9–50)
GLOBULIN SER CALC-MCNC: 4.4 G/DL (ref 1.9–3.5)
GLUCOSE SERPL-MCNC: 66 MG/DL (ref 65–99)
HCT VFR BLD AUTO: 30.8 % (ref 37–47)
HGB BLD-MCNC: 9.5 G/DL (ref 12–16)
MAGNESIUM SERPL-MCNC: 1.5 MG/DL (ref 1.5–2.5)
MCH RBC QN AUTO: 27.9 PG (ref 27–33)
MCHC RBC AUTO-ENTMCNC: 30.8 G/DL (ref 33.6–35)
MCV RBC AUTO: 90.6 FL (ref 81.4–97.8)
PHOSPHATE SERPL-MCNC: 4.9 MG/DL (ref 2.5–4.5)
PLATELET # BLD AUTO: 290 K/UL (ref 164–446)
PMV BLD AUTO: 10.1 FL (ref 9–12.9)
POTASSIUM SERPL-SCNC: 3.5 MMOL/L (ref 3.6–5.5)
PROT SERPL-MCNC: 7.1 G/DL (ref 6–8.2)
RBC # BLD AUTO: 3.4 M/UL (ref 4.2–5.4)
SODIUM SERPL-SCNC: 134 MMOL/L (ref 135–145)
WBC # BLD AUTO: 4.5 K/UL (ref 4.8–10.8)

## 2021-06-06 PROCEDURE — 700102 HCHG RX REV CODE 250 W/ 637 OVERRIDE(OP): Performed by: STUDENT IN AN ORGANIZED HEALTH CARE EDUCATION/TRAINING PROGRAM

## 2021-06-06 PROCEDURE — 94640 AIRWAY INHALATION TREATMENT: CPT

## 2021-06-06 PROCEDURE — 700111 HCHG RX REV CODE 636 W/ 250 OVERRIDE (IP): Performed by: INTERNAL MEDICINE

## 2021-06-06 PROCEDURE — 770006 HCHG ROOM/CARE - MED/SURG/GYN SEMI*

## 2021-06-06 PROCEDURE — 700111 HCHG RX REV CODE 636 W/ 250 OVERRIDE (IP): Performed by: STUDENT IN AN ORGANIZED HEALTH CARE EDUCATION/TRAINING PROGRAM

## 2021-06-06 PROCEDURE — 80053 COMPREHEN METABOLIC PANEL: CPT

## 2021-06-06 PROCEDURE — 85027 COMPLETE CBC AUTOMATED: CPT

## 2021-06-06 PROCEDURE — 99233 SBSQ HOSP IP/OBS HIGH 50: CPT | Performed by: STUDENT IN AN ORGANIZED HEALTH CARE EDUCATION/TRAINING PROGRAM

## 2021-06-06 PROCEDURE — 84100 ASSAY OF PHOSPHORUS: CPT

## 2021-06-06 PROCEDURE — 83735 ASSAY OF MAGNESIUM: CPT

## 2021-06-06 PROCEDURE — A9270 NON-COVERED ITEM OR SERVICE: HCPCS | Performed by: STUDENT IN AN ORGANIZED HEALTH CARE EDUCATION/TRAINING PROGRAM

## 2021-06-06 PROCEDURE — 94760 N-INVAS EAR/PLS OXIMETRY 1: CPT

## 2021-06-06 PROCEDURE — 700102 HCHG RX REV CODE 250 W/ 637 OVERRIDE(OP): Performed by: HOSPITALIST

## 2021-06-06 PROCEDURE — A9270 NON-COVERED ITEM OR SERVICE: HCPCS | Performed by: HOSPITALIST

## 2021-06-06 RX ORDER — POTASSIUM CHLORIDE 20 MEQ/1
20 TABLET, EXTENDED RELEASE ORAL DAILY
Status: COMPLETED | OUTPATIENT
Start: 2021-06-06 | End: 2021-06-07

## 2021-06-06 RX ADMIN — CEFAZOLIN SODIUM 2 G: 2 INJECTION, SOLUTION INTRAVENOUS at 15:17

## 2021-06-06 RX ADMIN — GABAPENTIN 300 MG: 300 CAPSULE ORAL at 15:17

## 2021-06-06 RX ADMIN — OXYCODONE 5 MG: 5 TABLET ORAL at 02:06

## 2021-06-06 RX ADMIN — NYSTATIN: 100000 POWDER TOPICAL at 08:05

## 2021-06-06 RX ADMIN — METHADONE HYDROCHLORIDE 30 MG: 10 TABLET ORAL at 06:22

## 2021-06-06 RX ADMIN — DULOXETINE HYDROCHLORIDE 60 MG: 60 CAPSULE, DELAYED RELEASE ORAL at 17:27

## 2021-06-06 RX ADMIN — DOCUSATE SODIUM 50 MG AND SENNOSIDES 8.6 MG 2 TABLET: 8.6; 5 TABLET, FILM COATED ORAL at 06:22

## 2021-06-06 RX ADMIN — OXYCODONE 5 MG: 5 TABLET ORAL at 07:13

## 2021-06-06 RX ADMIN — CEFAZOLIN SODIUM 2 G: 2 INJECTION, SOLUTION INTRAVENOUS at 20:35

## 2021-06-06 RX ADMIN — ENOXAPARIN SODIUM 40 MG: 40 INJECTION SUBCUTANEOUS at 06:21

## 2021-06-06 RX ADMIN — GABAPENTIN 300 MG: 300 CAPSULE ORAL at 06:23

## 2021-06-06 RX ADMIN — PAROXETINE HYDROCHLORIDE 10 MG: 10 TABLET, FILM COATED ORAL at 06:23

## 2021-06-06 RX ADMIN — DIVALPROEX SODIUM 500 MG: 125 CAPSULE ORAL at 20:34

## 2021-06-06 RX ADMIN — MAGNESIUM OXIDE TAB 400 MG (241.3 MG ELEMENTAL MG) 400 MG: 400 (241.3 MG) TAB at 08:01

## 2021-06-06 RX ADMIN — OXYCODONE 5 MG: 5 TABLET ORAL at 20:35

## 2021-06-06 RX ADMIN — ACETAMINOPHEN 500 MG: 325 TABLET ORAL at 20:35

## 2021-06-06 RX ADMIN — DIPHENHYDRAMINE HYDROCHLORIDE 50 MG: 50 INJECTION INTRAMUSCULAR; INTRAVENOUS at 21:39

## 2021-06-06 RX ADMIN — DIPHENHYDRAMINE HYDROCHLORIDE 50 MG: 50 INJECTION INTRAMUSCULAR; INTRAVENOUS at 03:46

## 2021-06-06 RX ADMIN — ACETAMINOPHEN 500 MG: 325 TABLET ORAL at 15:18

## 2021-06-06 RX ADMIN — GABAPENTIN 300 MG: 300 CAPSULE ORAL at 20:35

## 2021-06-06 RX ADMIN — CEFAZOLIN SODIUM 2 G: 2 INJECTION, SOLUTION INTRAVENOUS at 06:21

## 2021-06-06 RX ADMIN — ACETAMINOPHEN 500 MG: 325 TABLET ORAL at 08:01

## 2021-06-06 RX ADMIN — DIVALPROEX SODIUM 500 MG: 125 CAPSULE ORAL at 06:22

## 2021-06-06 RX ADMIN — DIPHENHYDRAMINE HYDROCHLORIDE 50 MG: 50 INJECTION INTRAMUSCULAR; INTRAVENOUS at 09:49

## 2021-06-06 RX ADMIN — NYSTATIN: 100000 POWDER TOPICAL at 15:18

## 2021-06-06 RX ADMIN — DULOXETINE HYDROCHLORIDE 60 MG: 60 CAPSULE, DELAYED RELEASE ORAL at 06:22

## 2021-06-06 RX ADMIN — RISPERIDONE 2 MG: 2 TABLET ORAL at 06:23

## 2021-06-06 RX ADMIN — DIVALPROEX SODIUM 500 MG: 125 CAPSULE ORAL at 15:17

## 2021-06-06 RX ADMIN — RISPERIDONE 2 MG: 2 TABLET ORAL at 17:27

## 2021-06-06 RX ADMIN — DIPHENHYDRAMINE HYDROCHLORIDE 50 MG: 50 INJECTION INTRAMUSCULAR; INTRAVENOUS at 15:38

## 2021-06-06 RX ADMIN — POTASSIUM CHLORIDE 20 MEQ: 1500 TABLET, EXTENDED RELEASE ORAL at 08:01

## 2021-06-06 RX ADMIN — OXYCODONE 5 MG: 5 TABLET ORAL at 15:26

## 2021-06-06 ASSESSMENT — ENCOUNTER SYMPTOMS
DIARRHEA: 0
NECK PAIN: 0
SHORTNESS OF BREATH: 0
FOCAL WEAKNESS: 0
HEARTBURN: 0
SENSORY CHANGE: 0
SPUTUM PRODUCTION: 1
MYALGIAS: 0
CONSTIPATION: 0
BLURRED VISION: 0
DIZZINESS: 0
BACK PAIN: 0
PHOTOPHOBIA: 0
CHILLS: 0
VOMITING: 0
FEVER: 0
HALLUCINATIONS: 0
NAUSEA: 0
COUGH: 0
ABDOMINAL PAIN: 0
DOUBLE VISION: 0

## 2021-06-06 ASSESSMENT — PAIN DESCRIPTION - PAIN TYPE
TYPE: ACUTE PAIN

## 2021-06-06 NOTE — RESPIRATORY CARE
Capping trial initiated at 1505, patient tolerating well on 2L maintaining sats greater than 93%

## 2021-06-06 NOTE — PROGRESS NOTES
Hospital Medicine Daily Progress Note    Date of Service  6/6/2021    Chief Complaint  50 y.o. female admitted 4/16/2021 with recurrent infections    Hospital Course   This is a 50-year-old female with a past medical history of pseudotumor cerebri s/p  shunt implantation by Dr. 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  [facility] infectious disease is managing antibiotics for the patient.  Dr. Candelaria surgery removed the port on 4/12.  The patient was further identified to have endocarditis of the tricuspid valve, a lumbar puncture performed on 4/14 showed pleocytosis with negative Gram stain.  The patient suffered worsening leukocytosis and was found to have septic emboli per CT.  Initially a small pleural catheter was placed.  Significant loculations were encountered.  MSSA empyema was diagnosed.  Neurosurgery was consulted to comment on possibly removal of a  shunt, this was felt not appropriate at this time.  The patient remained on mechanical ventilation for 15 days, then a perc trach was placed.  The patient was eventually liberated from mechanical ventilation, the patient did have a right thoracoscopy and decortication of the lung performed on 4/28 by Dr. Ganser.  The patient was of identified to have multiple sources for MSSA including mitral valve, tricuspid valve vegetation, port, spinal stimulator,  shunt possibly.  Patient underwent repeat chest surgery, decortication with 2 chest tube placement on 5/18, had to be placed back on mechanical ventilation after surgery, central access was replaced on 5/19, the patient treated  in ICU with eventual chest tube removal with assistance of thoracic surgery and was deescalated in terms of respiratory support down to trach collar.  In need of extensive rehabilitation, plan for LTAC level of care on discharge.    Interval Problem Update  Patient seen and examined today.    Patient tolerating treatment and therapies.  All Data, Medication data reviewed.  Case discussed with nursing as available.  Plan of Care reviewed with patient and notified of changes.  5/26 the patient on T-piece, 30% FiO2, no increase in work of breathing, small amount of secretions, good cough, chest x-ray improving, chest surgeon removed both chest tubes, ongoing antibiotic treatment with assistance of [facility] infectious disease.  Transfer options being evaluated.  Afebrile, tachycardic with heart rate in the 1 teens, blood pressure is soft but maintaining MAP.  Laboratory data reviewed, electrolytes are balanced, no leukocytosis, improving hemoglobin, chest x-ray with stable lines and tubes, stable ill-defined bilateral pulmonary opacities, no large effusions  5/27 the patient appears better, she is ambulating, the patient was cleared for diet, the patient is awake alert and following, afebrile, blood pressure between 90 and 130, no increase in respiratory difficulty, afebrile, ongoing antibiotic treatment through 6/14  Ongoing rehab efforts, awaiting LTAC approval    5/28 patient is in bed, core track removed, patient is tolerating diet, family is at bedside, discussed with , LTAC pending, patient tachycardic any symptoms no fever, blood pressure is soft but stable.  5/29 patient in bed, no fever or chills. Tolerating diet, vs stable. On T piece o2 requirement stable.   5/30 in bed looks comfortable, not in pain, o2 requirement increasing I will ordered cxr, discussed with RT today, continue iv abx per ID.   5/31 in bed feeling ok, o2 requirements decreased to 5L, no fever or chills. Discussed with nurse  staff.   6/1 Patient reports breathing has improved. Still with some pain at thoracotomy site, otherwise appears comfortable. Continue Abx until 6/14 per ID, will need long term Abx suppression with keflex following completion of Abx.  6/2 No acute events overnight. Remains on oxygen with T piece. Reports breathing better than yesterday. Remains on Abx. Pending LTAC approval  6/3 Patient wound vac removed by wound care per thoracic surgeon orders. Worsening rash on buttocks and back, appears to be folliculitis and related to moisture. Wound care following. Awaiting LTAC approval  6/4 Patient reports improvement in pruritis with IV benadryl. She reports improvement in strength and was out of bed. Per thoracic surgery note, sutures can be removed 6/9. Waiting on LTAC approval.  6/5 No acute events overnight, some minor desaturations on 4L oxygen and was put back to 5L by T tube. Patient wants to get out of bed, informed nurse out of bed as tolerated.  6/6 No acute events overnight. Patient remains weak and debilitated. Has been out of bed more. Folliculitis on back improved with moisture control. Remains trach'ed on Abx    Consultants/Specialty  ID  Thoracic surgery  Pulmonary critical care    Code Status  Full Code    Disposition  Rehab, hopefully LTAC    Review of Systems  Review of Systems   Constitutional: Positive for malaise/fatigue. Negative for chills and fever.   HENT:        Tracheostomy   Eyes: Negative for blurred vision, double vision and photophobia.   Respiratory: Positive for sputum production. Negative for cough and shortness of breath.    Cardiovascular: Negative for chest pain and leg swelling.   Gastrointestinal: Negative for heartburn, nausea and vomiting.   Genitourinary: Negative for dysuria, frequency and urgency.   Musculoskeletal: Negative for back pain, myalgias and neck pain.   Skin: Positive for rash (buttocks).   Neurological: Negative for dizziness, sensory change and focal weakness.    Psychiatric/Behavioral: Negative for hallucinations and suicidal ideas.   Poor communication secondary to tracheostomy    Physical Exam  Temp:  [36.1 °C (97 °F)-37.2 °C (98.9 °F)] 36.5 °C (97.7 °F)  Pulse:  [] 92  Resp:  [15-18] 16  BP: ()/(57-78) 90/57  SpO2:  [91 %-96 %] 93 %    Physical Exam  Vitals and nursing note reviewed.   Constitutional:       General: She is not in acute distress.     Appearance: She is well-developed. She is ill-appearing. She is not diaphoretic.   HENT:      Head: Normocephalic and atraumatic.   Eyes:      General: No scleral icterus.  Neck:      Vascular: No JVD.   Cardiovascular:      Rate and Rhythm: Regular rhythm. Tachycardia present.   Pulmonary:      Effort: Pulmonary effort is normal. No respiratory distress.      Breath sounds: No stridor. Rales present.      Comments: Trach without inflammation  Abdominal:      General: There is no distension.      Palpations: Abdomen is soft.      Tenderness: There is no abdominal tenderness.   Skin:     Coloration: Skin is pale.      Findings: Rash (pustular rash on buttocks and back improving) present.   Neurological:      Mental Status: She is alert and oriented to person, place, and time.      Motor: Weakness present.   Psychiatric:         Thought Content: Thought content normal.         Fluids    Intake/Output Summary (Last 24 hours) at 6/6/2021 1345  Last data filed at 6/6/2021 0934  Gross per 24 hour   Intake 180 ml   Output --   Net 180 ml       Laboratory  Recent Labs     06/06/21  0400   WBC 4.5*   RBC 3.40*   HEMOGLOBIN 9.5*   HEMATOCRIT 30.8*   MCV 90.6   MCH 27.9   MCHC 30.8*   RDW 52.2*   PLATELETCT 290   MPV 10.1     Recent Labs     06/06/21  0400   SODIUM 134*   POTASSIUM 3.5*   CHLORIDE 97   CO2 29   GLUCOSE 66   BUN 6*   CREATININE 0.22*   CALCIUM 8.9                   Imaging  DX-CHEST-PORTABLE (1 VIEW)   Final Result      1.  Interval removal of the right-sided chest tubes. No pneumothorax is identified.   2.   Airspace opacities in the right are mildly improved and may represent atelectasis/consolidation.   3.  There is likely a small right pleural effusion.   4.  Patchy opacities on the left are mildly improved.   5.  Interval removal of the enteric tube.      DX-CHEST-PORTABLE (1 VIEW)   Final Result         1. Stable lines and tubes.   2. Stable ill-defined bilateral pulmonary opacities, right worse than left. No large pleural effusions.         DX-CHEST-PORTABLE (1 VIEW)   Final Result      1.  Decrease in volume of right pleural fluid collection/empyema with 2 right chest tubes in place. No pneumothorax identified.      2.  No focal consolidation in the left lung.      DX-CHEST-PORTABLE (1 VIEW)   Final Result      No significant interval change.      DX-CHEST-PORTABLE (1 VIEW)   Final Result         1.  Hazy right pulmonary infiltrates and/or effusion, stable compared to prior study.      DX-CHEST-PORTABLE (1 VIEW)   Final Result         1.  Hazy right pulmonary infiltrates and/or effusion, stable compared to prior study.      DX-CHEST-PORTABLE (1 VIEW)   Final Result      1.  All lines and tubes appear appropriately located      2.  Consolidation throughout the right lung consistent with pneumonia      DX-CHEST-PORTABLE (1 VIEW)   Final Result         1.  Hazy right pulmonary infiltrates and/or effusion, increased compared to prior study.      DX-ABDOMEN FOR TUBE PLACEMENT   Final Result      Feeding tube looped in the stomach.      DX-CHEST-PORTABLE (1 VIEW)   Final Result         1. Stable lines and tubes. No pneumothorax detected.   2. Persistent opacities in the right perihilar region and in the right lung periphery, similar to prior study. Left lung is essentially clear.      DX-CHEST-PORTABLE (1 VIEW)   Final Result      Postoperative changes of the right chest with placement of an additional chest tube. There is improved aeration in the right lung. No significant pleural effusion or definite pneumothorax.       Right IJ central venous catheter tip projects over the proximal right atrium.      Hypoinflation with interstitial and hazy pulmonary opacities.      DX-CHEST-PORTABLE (1 VIEW)   Final Result         1.  Hazy right pulmonary infiltrates and/or effusion, similar compared to prior study.      DX-ABDOMEN FOR TUBE PLACEMENT   Final Result      Feeding tube tip projects over the expected location the gastric antrum.      DX-CHEST-PORTABLE (1 VIEW)   Final Result         1.  Hazy right pulmonary infiltrates and/or effusion, similar compared to prior study.      DX-CHEST-LIMITED (1 VIEW)   Final Result      1.  No pneumothorax. Only one chest tube is now seen in the right lung.   2.  The chest is otherwise stable.      DX-CHEST-PORTABLE (1 VIEW)   Final Result         1.  Hazy right pulmonary infiltrates and/or effusion, similar compared to prior study.      DX-CHEST-PORTABLE (1 VIEW)   Final Result         1.  Hazy right pulmonary infiltrates and/or effusion, similar compared to prior study.      DX-ABDOMEN FOR TUBE PLACEMENT   Final Result         1.  Nonspecific bowel gas pattern.   2.  Dobbhoff tube tip terminates overlying the expected location of the gastric antrum.   3.  Hazy bilateral lung base infiltrates, greater on the right.      DX-CHEST-PORTABLE (1 VIEW)   Final Result         1.  Hazy right pulmonary infiltrates and/or effusion, similar compared to prior study.      US-RUQ   Final Result         1.  Hepatomegaly   2.  Mild intrahepatic and extrahepatic biliary ductal dilatation, commonly associated with postcholecystectomy physiology, consider causes of biliary obstruction with additional workup as clinically appropriate      CT-CHEST,ABDOMEN,PELVIS WITH   Final Result      1.  Large empyema in the RIGHT hemithorax, slightly decreased in size from prior exam with chest tubes present.   2.  Consolidation remains.   3.  Multiple cavitary lesions again seen in the LEFT upper lobe, minimally decreased from  prior, concerning for septic emboli.   4.  Supportive tubing is unchanged.   5.  Intrahepatic and extrahepatic biliary dilation, likely postoperative although distal stricture, stone or mass remain possibilities.   6.  Moderate pelvic fluid, nonspecific.   7.  No evidence of bowel obstruction or perforation.      DX-CHEST-PORTABLE (1 VIEW)   Final Result         1.  Hazy right pulmonary infiltrates and/or effusion, similar compared to prior study.      DX-CHEST-PORTABLE (1 VIEW)   Final Result         No significant interval change.            DX-CHEST-PORTABLE (1 VIEW)   Final Result         Interval removal of left central venous catheter. Interval adjustment of right PICC with tip in the SVC.         DX-ABDOMEN FOR TUBE PLACEMENT   Final Result      Feeding tube tip at the mid to distal stomach.      IR-PICC LINE PLACEMENT W/ GUIDANCE > AGE 5   Final Result                  Ultrasound-guided PICC placement performed by qualified nursing staff as    above.          DX-CHEST-PORTABLE (1 VIEW)   Final Result      1.  Stable cardiopulmonary findings.   2.  See comments above regarding the right-sided PICC position.      DX-CHEST-PORTABLE (1 VIEW)   Final Result      Stable chest findings compared to 5/5.      DX-CHEST-PORTABLE (1 VIEW)   Final Result      Stable chest findings compared with 5/3.      DX-ABDOMEN FOR TUBE PLACEMENT   Final Result         Feeding tube with tip projecting over the expected area of the stomach.      DX-CHEST-PORTABLE (1 VIEW)   Final Result      Stable chest findings compared with prior.      DX-CHEST-PORTABLE (1 VIEW)   Final Result         1. No significant interval change.      US-EXTREMITY ARTERY LOWER UNILAT RIGHT   Final Result      DX-CHEST-PORTABLE (1 VIEW)   Final Result         1. No significant interval change.      IR-PICC LINE PLACEMENT W/ GUIDANCE > AGE 5   Final Result                  Ultrasound-guided PICC placement performed by qualified nursing staff as    above.           DX-CHEST-PORTABLE (1 VIEW)   Final Result         1.  Pulmonary edema and/or infiltrates, similar to prior study.   2.  Stable opacification right lung, likely effusion. Thoracostomy tubes are in place.   3.  Multiple cavitary appearing left pulmonary lesions, see CT performed April 27, 2021 for further characterization      DX-CHEST-PORTABLE (1 VIEW)   Final Result         1.  Pulmonary edema and/or infiltrates, similar to prior study.   2.  Single opacification right lung, likely effusion. Thoracostomy tubes are in place.   3.  Multiple cavitary appearing left pulmonary lesions, see CT performed April 27, 2021 for further characterization   4.  Soft tissue gas in the right chest wall      DX-CHEST-PORTABLE (1 VIEW)   Final Result         1.  Pulmonary edema and/or infiltrates, similar to prior study.   2.  Increased opacification right lung, likely increased effusion. Thoracostomy tubes are in place.   3.  Multiple cavitary appearing left pulmonary lesions, see CT performed April 27, 2021 for further characterization   4.  Soft tissue gas in the right chest wall      DX-CHEST-PORTABLE (1 VIEW)   Final Result         1.  Pulmonary edema and/or infiltrates, similar to prior study.   2.  Right pneumothorax, stable compared to prior study, right thoracostomy tubes remain in place   3.  Multiple cavitary appearing left pulmonary lesions, see CT performed April 27, 2021 for further characterization   4.  Soft tissue gas in the right chest wall      DX-CHEST-PORTABLE (1 VIEW)   Final Result         1.  Pulmonary edema and/or infiltrates, similar to prior study.   2.  Right pneumothorax, interval decreased compared to prior study, interval placement of right thoracostomy tubes is noted.   3.  Multiple cavitary appearing left pulmonary lesions, see CT performed yesterday for further characterization   4.  Soft tissue gas in the right chest wall      DX-CHEST-PORTABLE (1 VIEW)   Final Result         1.  Pulmonary edema  and/or infiltrates, similar to prior study.   2.  Right pneumothorax, stable compared to prior study, interval removal of right thoracostomy tube is noted.   3.  Multiple cavitary appearing left pulmonary lesions, see CT performed yesterday for further characterization   4.  Soft tissue gas in the right chest wall      DX-CHEST-LIMITED (1 VIEW)   Final Result         No significant interval change.      CT-CTA CHEST PULMONARY ARTERY W/ RECONS   Final Result         No CT evidence of pulmonary embolus.      Previous right chest tube were removed.      Grossly similar in size of the loculated right hydropneumothorax but there is increased layering fluid component. Unchanged mild shift of the mediastinal to the left.      Redemonstration of a pigtail catheter in the medial left lung base. Grossly similar multiple cavitary lesions in the left lung.      US-EXTREMITY VENOUS LOWER BILAT   Final Result      POCT BUTTERFLY-DUPLEX SCAN EXTREMITY VEINS LIMITED   Final Result      DX-CHEST-PORTABLE (1 VIEW)   Final Result         1.  Pulmonary edema and/or infiltrates, similar to prior study.   2.  Right pneumothorax, somewhat decreased to prior study, interval removal of right thoracostomy tube is noted.   3.  Multiple cavitary appearing left pulmonary lesions, see CT performed yesterday for further characterization   4.  Soft tissue gas in the right chest wall      DX-CHEST-PORTABLE (1 VIEW)   Final Result         1.  Pulmonary edema and/or infiltrates, similar to prior study.   2.  Right pneumothorax, similar to prior study with thoracostomy tube in place.   3.  Multiple cavitary appearing left pulmonary lesions, see CT performed yesterday for further characterization      CT-CHEST (THORAX) W/O   Final Result      Large loculated right hydropneumothorax with interval increase in size of the pneumothorax and pleural fluid.      Right chest tube is in the posterior right thorax.      There is mediastinal shift to the left  compatible with tension.      Small pericardial effusion.      Small loculated left pleural effusion with overlying atelectasis/consolidation.   Pigtail catheter is seen at the medial left lung base. Pleural effusion has decreased in size compared to prior.      There are multiple foci of air extending from the right lung to the pleural space suspicious for a bronchopleural fistula.      Multiple cavitary lesions bilaterally with mild interval decrease of the solid component of the cavitary nodules.      Noncavitary 3.1 cm masslike density is seen at the left lung base.      Findings may be related to septic emboli, malignancy or multifocal abscesses. Short interval follow-up recommended.      Soft tissue air on the right is again seen.      Splenomegaly      Findings discussed with Leti Sun.      DX-CHEST-PORTABLE (1 VIEW)   Final Result      No significant change from prior exam.      CT-HEAD W/O   Final Result      1.  RIGHT frontal ventriculostomy catheter unchanged in position.   2.  Mild cortical atrophy.   3.  No intracranial hemorrhage.   4.  Apparent dural thickening overlying the clivus.  Consider further evaluation with contrast-enhanced MRI.      DX-CHEST-LIMITED (1 VIEW)   Final Result      Interval left basilar pigtail catheter with diminished atelectasis, pleural fluid      Stable right hydropneumothorax with thoracostomy tube in place, considerable soft tissue gas and partial lung collapse      IR-CHEST TUBE-EMPYEMA LEFT   Final Result      1.  CT GUIDED LEFT  10 [place] PIGTAIL CHEST TUBE PLACEMENT FOR POSSIBLE EMPYEMA YIELDING OLD BLOODY FLUID.      2. A CHEST RADIOGRAPH IS FORTHCOMING.      EC-MICAH W/O CONT   Final Result      US-ABDOMEN LTD (SOFT TISSUE)   Final Result      No fluid seen surrounding the electronic implanted spinal stimulator device.      DX-CHEST-LIMITED (1 VIEW)   Final Result      1.  Large right hydropneumothorax with right-sided chest tube in place, likely stable to  slightly decreased from prior study.   2.  Small left pleural effusion. Mild improved aeration in the left lung.   3.  Bilateral pulmonary opacities which could be related to combination of atelectasis, edema and/or infection..      CT-CTA HEAD WITH & W/O-POST PROCESS   Final Result      1.  Patent carotid and vertebral arteries.      2.  Again seen right frontal the jugular peritoneal shunt catheter. There is mild increase in volume of the lateral and third ventricles.      CT-CHEST (THORAX) W/O   Final Result      1.  Interval placement of a right-sided chest tube into a large loculated right-sided pleural effusion. There is now seen a large right-sided hydropneumothorax in the area that previously seen fluid. The chest tube extends into that hydropneumothorax.    There is some residual pleural fluid seen as well. A hydropneumothorax is somewhat loculated with components most prominently inferiorly and medially.      2.  New loculated left pleural effusion.      3.  Again seen multiple bilateral cavitary pulmonary masses which are more prominent than previously seen with increasing cavitation and measure up to 3 cm size. Differential diagnosis includes septic emboli, multifocal abscesses and neoplasm.      4.  Large amount of subcutaneous emphysema on the right.      5.  Splenomegaly.      6.  Multiple support devices.      Fleischner Society pulmonary nodule recommendations:         DX-CHEST-LIMITED (1 VIEW)   Final Result      1.  Support devices as described above.      2.  Right chest tube present with a again seen right-sided pneumothorax, possibly increased in size and loculated.      3.  Worsening bilateral pulmonary infiltrates.      DX-ABDOMEN FOR TUBE PLACEMENT   Final Result      Cortrak feeding tube tip projects in the region of the duodenal bulb.      DX-CHEST-LIMITED (1 VIEW)   Final Result      Loculated right pneumothorax is decreased in size compared to prior.      Small left pleural effusion.       Small loculated right pleural effusion.      Extensive bilateral airspace opacities are not significantly changed.      Soft tissue air on the right is again noted.      DX-CHEST-LIMITED (1 VIEW)   Final Result      Decreased partially loculated right pleural fluid with increased pleural air. Right chest tube is in place.      Increased hazy opacity in the left lung possibly atelectasis.         US-ABDOMEN LTD (SOFT TISSUE)   Final Result      No drainable fluid collection.      DX-CHEST-PORTABLE (1 VIEW)   Final Result      Decreased partially loculated right pleural fluid with increased pleural air. Right chest tube is in place.      No other significant change.      DX-CHEST-PORTABLE (1 VIEW)   Final Result         1.  Pulmonary edema and/or infiltrates are identified, which are somewhat decreased since the prior exam.   2.  Moderate loculated appearing right pleural effusion, slightly decreased since prior      DX-ABDOMEN FOR TUBE PLACEMENT   Final Result      Feeding tube tip at the distal stomach.      DX-CHEST-PORTABLE (1 VIEW)   Final Result         1.  New chest tube is noted in the right lower hemithorax.      2.  Right pleural effusion is again identified which appears similar to the prior exam.      3.  No new infiltrates or consolidations are identified.      DX-CHEST-PORTABLE (1 VIEW)   Final Result         1.  Pulmonary edema and/or infiltrates are identified, which are stable since the prior exam.   2.  Moderate loculated appearing right pleural effusion      DX-CHEST-PORTABLE (1 VIEW)   Final Result         No significant interval change.      POCT BUTTERFLY-ECHOCARDIOGRAM LTD W/O CONT    (Results Pending)        Assessment/Plan  * Empyema of right pleural space (HCC)- (present on admission)  Assessment & Plan  Patient s/p multiple chest tubes, thoracotomy and decortication  Now s/p second decortication 5/17  Per thoracic surgery note, sutures to be removed on 6/9      Debility  Assessment &  Plan  Acute on chronic, the patient likely need of LTAC treatment post acute treatment    Elevated alkaline phosphatase level- (present on admission)  Assessment & Plan  The patient not able to tolerate a MRCP at this time, ultrasound grossly unremarkable, observe    Agitation requiring sedation protocol- (present on admission)  Assessment & Plan  Monitor,    Goals of care, counseling/discussion  Assessment & Plan  Patient with overall high risk of morbidity and mortality given her gravely ill state and prior chronic medical conditions  Palliative care following    Spinal cord stimulator status  Assessment & Plan  Patient's stimulator is [employer]. It is FDA approved as MRI compatible, but the company has gone out of business, so there is no support team to assist with MRI.  Thus, if MRI were performed, our radiologists would need to protocol it correctly to manage the stimulator. The former rep from [employer] is Andre, [phone].  Information obtained from Dr. Mahoney's office, [phone].     Per  (5/4/2021), it is not MRI compatible unfortunately.     Thrombocytosis (HCC)  Assessment & Plan  Acute reaction since resolved    Anasarca- (present on admission)  Assessment & Plan  Improved nutritional status, skin protective measures    Trapped lung  Assessment & Plan  Now s/p decortication   Chest tubes removed on 5/26, observe    Pulmonary abscess (HCC)  Assessment & Plan  From septic emboli  Now s/p decortication    Fever  Assessment & Plan  Resolved, monitor, antibiotic therapy    Atelectasis  Assessment & Plan  Pulmonary recruiting, respiratory care    Pneumonia  Assessment & Plan  MSSA  Ancef per ID    History of vasculitis- (present on admission)  Assessment & Plan  Does not appear to be a current issue    Bacteremia due to methicillin susceptible Staphylococcus aureus (MSSA)- (present on admission)  Assessment & Plan  Infectious disease assisting with antibiotic treatment  Multiple potential  sources  Endocarditis  ID following  Continue Ancef until 6/14    CSF pleocytosis- (present on admission)  Assessment & Plan  Infectious disease managing    Acute bacterial endocarditis- (present on admission)  Assessment & Plan  Mitral valve, tricuspid valve  MSSA  Ongoing antibiotic therapy for acute infection and 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    Acute blood loss anemia  Assessment & Plan  Likely secondary to hemothorax  300 cc of blood removed after chest tube placed at [facility]  Trend hemoglobin    Prolonged Q-T interval on ECG- (present on admission)  Assessment & Plan  Resolved after correcting metabolic issues  Cont to follow   Monitor electrolytes, acidosis etc    Acute respiratory failure with hypoxia (HCC)- (present on admission)  Assessment & Plan  Secondary to MSSA bacteremia, empyema, pulmonary septic emboli  S/p tracheostomy  Ongoing respiratory care, pulmonary toilet, antibiotic treatment  Prolonged ventilator dependence  Will need LTAC  cxr showed improvement  Wean oxygen as tolerated  CPT.    History of complicated migraines- (present on admission)  Assessment & Plan  Monitor    GERD (gastroesophageal reflux disease)- (present on admission)  Assessment & Plan  History of, as needed treatment    Hyponatremia  Assessment & Plan  Resolved  Follow daily    Tachycardia  Assessment & Plan  Persistent, likely secondary with multiple underlying ongoing conditions  Monitor  Stable    Pseudotumor cerebri  Assessment & Plan  History of  shunt, neurosurgery opted against removal    H/O: CVA (cerebrovascular accident)- (present on admission)  Assessment & Plan  Monitor    Chronic pain syndrome- (present on admission)  Assessment & Plan  Pre-existing, pain management    Port or reservoir infection  Assessment & Plan  S/p removal    Thrombocytopenia (HCC)- (present on admission)  Assessment & Plan  Transient, resolved    Anemia- (present on admission)  Assessment & Plan  Monitor,  transfuse as indicated    Septic shock (HCC)- (present on admission)  Assessment & Plan  Multiple sources, recovered with aggressive treatment, IV antibiotics long-term  Resolved    Sepsis (HCC)  Assessment & Plan  Multiple sources possible, recovered with medical treatment  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 Oh felt there is currently no need to remove    Folliculitis  Assessment & Plan  Pustular rash of back and buttocks  Presenting on dependent areas of skin  Moisture related  Recommend out of bed  Wound care for moisture mitigation  On IV ancef and do not think that topical Abx would be helpful    Improved    Acute encephalopathy- (present on admission)  Assessment & Plan  Per neurosurgery no need to remove  shunt  Improved           VTE prophylaxis: enoxaparin    Case and plan of care discussed with nurse staff during multidisciplinary rounds.    Patient is has a high medical complexity and is at high risk for complication, morbidity, and mortality.

## 2021-06-06 NOTE — CARE PLAN
The patient is Watcher - Medium risk of patient condition declining or worsening    Shift Goals  Clinical Goals: pain management  Patient Goals: sleep comfortably  Family Goals: NA    Progress made toward(s) clinical / shift goals:    PRN pain medication administered per orders. Patient able to sleep comfortably. Skin inspected to ensure it remains clean and dry.    Patient is not progressing towards the following goals:      Problem: Pain Management  Goal: Pain level will decrease to patient's comfort goal  6/5/2021 2326 by Amy Foss RCANDACE.  Outcome: Progressing  6/5/2021 2325 by REGLA Rader.N.  Outcome: Progressing     Problem: Knowledge Deficit - Standard  Goal: Patient and family/care givers will demonstrate understanding of plan of care, disease process/condition, diagnostic tests and medications  6/5/2021 2326 by Amy Foss R.N.  Outcome: Progressing  6/5/2021 2325 by GUADALUPE RaderN.  Outcome: Progressing     Problem: Fall Risk  Goal: Patient will remain free from falls  6/5/2021 2326 by REGLA Rader.N.  Outcome: Progressing  6/5/2021 2325 by REGLA Rader.N.  Outcome: Progressing     Problem: Mobility  Goal: Patient's capacity to carry out activities will improve  6/5/2021 2326 by Amy Foss R.N.  Outcome: Progressing  6/5/2021 2325 by Amy Foss R.N.  Outcome: Progressing     Problem: Hemodynamics  Goal: Patient's hemodynamics, fluid balance and neurologic status will be stable or improve  6/5/2021 2326 by Amy Foss R.N.  Outcome: Progressing  6/5/2021 2325 by Amy Foss R.N.  Outcome: Progressing     Problem: Fluid Volume  Goal: Fluid volume balance will be maintained  6/5/2021 2326 by REGLA Rader.N.  Outcome: Progressing  6/5/2021 2325 by REGLA Rader.N.  Outcome: Progressing     Problem: Urinary - Renal Perfusion  Goal: Ability to achieve and maintain adequate renal perfusion and functioning will improve  6/5/2021 2326 by GUADALUPE RaderN.  Outcome: Progressing  6/5/2021 2325 by  Amy Foss R.N.  Outcome: Progressing     Problem: Respiratory  Goal: Patient will achieve/maintain optimum respiratory ventilation and gas exchange  6/5/2021 2326 by Amy Foss R.N.  Outcome: Progressing  6/5/2021 2325 by Amy Foss R.N.  Outcome: Progressing     Problem: Physical Regulation  Goal: Diagnostic test results will improve  6/5/2021 2326 by Amy Foss R.N.  Outcome: Progressing  6/5/2021 2325 by Amy Foss R.N.  Outcome: Progressing  Goal: Signs and symptoms of infection will decrease  6/5/2021 2326 by Amy Foss R.N.  Outcome: Progressing  6/5/2021 2325 by Amy Foss R.N.  Outcome: Progressing

## 2021-06-06 NOTE — PROGRESS NOTES
Received report from T8 nurse.  Patient arrived to floor @ 1130.  Assessment complete and POC discussed.   VSS, on 4L of oxygen by T tube.   RT notified patient arrived to our floor. Spoke with Sylvia Vlaencia to come evaluate patient and ensure equipment is in proper place.  Patient denies pain, no apparent signs of distress or discomfort.   Patient is resting in bed.  Bed is in lowest/locked position.   Call light and belongings are within reach.   No further needs at this time.    1800: Patient was able to walk one full lap around unit with RN using FWW and 2L of oxygen via NC. Patient tolerated well, no desatting. Patient now sitting up in bed for dinner.

## 2021-06-06 NOTE — PROGRESS NOTES
4 Eyes Skin Assessment Completed by Amy Foss, RN and Sarah Barillas, RN.    Head WDL  Ears WDL  Nose WDL  Mouth WDL  Neck Incision  Breast/Chest Incision  Shoulder Blades WDL  Spine WDL  (R) Arm/Elbow/Hand WDL  (L) Arm/Elbow/Hand WDL  Abdomen WDL  Groin Rash  Scrotum/Coccyx/Buttocks Redness and Excoriation  (R) Leg WDL  (L) Leg WDL  (R) Heel/Foot/Toe WDL  (L) Heel/Foot/Toe WDL          Devices In Places Tracheostomy      Interventions In Place Heel Mepilex, Pillows and Q2 Turns    Possible Skin Injury No    Pictures Uploaded Into Epic N/A  Wound Consult Placed N/A  RN Wound Prevention Protocol Ordered Yes

## 2021-06-06 NOTE — PROGRESS NOTES
Infectious Disease Progress Note    Author: Brian Omer M.D. Date & Time created: 6/6/2021  4:15 PM     Cefazolin 5/18-5/19  Target 6/14/21     Thoracoscopy & Decortication - 4/28  Decortication 5/17     PRIOR Rx:   Zosyn 4/22-4/23  Previous: Unasyn, Zosyn, Vanco, Daptomycin  Ceftaroline: start 4/13-4/15  Nafcillin 2g Q4 hrs 4/15-4/23      Meropenem 5/19-5/26   Cefepime 4/23 5/18 4/14: Unable to give name of previous NSG or neurologist. Seems confused, but able to say some sentences fluently.   4/15: Line cultures now growing MSSA. As well as from the blood. Repeat blood culture 4/13+ in both sets. Patient has worsening confusion. CSF fluid analyses suggest pleocytosis. Cultures are no growth from the CSF. Chest CT was ordered this morning indicating a loculated right pleural effusion as well as bilateral septic emboli. Dr. Mack spoke with Dr. Oh yesterday and no surgical intervention unless clinical deterioration.  4/16: Increased respiratory distress.  Tachypneic.  CT with loculated collection.  Dr Resendiz not available.  No thoracic surgeon available.  Plans to have pulmonary place CT.  Transferring to ICU.  4/17: Transferred to [facility] last night. Hgb dropped to 6.4 requiring PRBC transfusion. Requiring 2 pressors. Fio2 50%. Febrile 38.8. Pending OR decortication of empyema and hemothorax. Chest tube placed at [facility] shows 8,371 WBC, ,000, 74% N  4/18: Chest tube was upsized by surgery yesterday, no decortication. WBC 22k. Fio2 40%. Vasopressin. Levophed down to 2mcg. Afebrile 24 hrs. Last fever 38.6 on 4/16.   4/19: Still on vent. Levo 3 mcg, vasopressin. Afebrile 72 hours. WBC 21k. BC 4/17 NGTD x 48 hours. Unable to do MRI brain given neurostimulator per RN.   4/20: Remaining afebrile. Hb 6.2 and undergoing transfusion today.WBC 24,100, 5% bands.1700 ml CT output. Copious bloody ET secretions. No pressors. Receiving dornase and TPA per CT. Right pleural effusion not large per CXR  today.   4/21: WBC 31k. Bronchoscopy yesterday showing blood. Cultures sent. TPA on hold per RN due to arvind blood from chest tube requiring PRBC transfusion for hgb 6. Pending chest CT today. Per , spinal stimulator is non-MRI compatible. Levophed 10mcg. 30% Fio2. Afebrile.   4/22: Fever 101.3 this am. WBC 20,300 down from 32,200 yesterday. BAL growing Klebsiella pneumoniae but susceptibility panel not set up until today. CTA of brain shows no lesion. CT of chest shows enlarging cavitary lung lesions bilat and large loculated new left pleural effusion and large hydropneumothorax on right. TPA & dornase infusions of right chest ended 4/20 after considerable bleeding requiring transfusion. Hb now down again to 6.8.  4/23: WBC 23k. Fio2 40%. Tmax 38.1. US of stiumlator shows small fluid collection but no drainable abscess. Pending MICAH today. Pending L chest tube placement  4/24: Continue fever 100.8-101.8. WBC 16,600. MICAH shows large vegetations on both tricuspid valve and mitral valve with mild TR & MR.  No aortic root abscess. Left chest tube placed yesterday yielding 8 ml of old bloody fluid. Bronch today revealed copious thick maroon secretions in distal trachea and both mainstem bronchi. On the right these were obstructive. Remaining off pressors. Last positive blood cultures (MSSA) were 4/16, negative 4/17.  4/25: Fever 100.6 this AM. wBC 13,300. Hb 6.6. CT: right hydropneumothorax increasing, right bronchopleural fistula, mediastinal shift ot the left , multiple cavitary pulmonary nodules, 3.1 cm left basilar mass, splenomegaly. CT of head shows dural thickening over the clivus. Lac+ GNR >100,000 col/ml growing from BAL of 4/24, presumed Klebsiella. Left peural fluid culture negative from 4/23.  4/26: Fever 100.4 last night. WBC 13,500. Hb 7.4. Plts 502,000. ESR >120. UA fairly clear except 30 mg% protein, 2-5 wbc and rare rbc. CXR unchanged except more prominent pulmonary edema. GNR in BAL may be a  different species of Klebsiella, and resistant to ampicillin & tmp-sulfa; confirmation pending.  4/28: Fever 100.8 last night. WBC 27,700. Hb 6.6. Plts 482,000. Creat 0.19. Kleb pneum in BAL on 4/24 is resistant to ampicillin & tmp-sulfa but otherwise sensitive. O2 sat 96% on FIO2 30% now, PEEP 8. Has been re-evaluated by thoracic surgeon, Dr. Ganser, who believes she will not wean without decortication on the right. Surgery anticipated today.  4/29: S/P right thoracoscopy with decortication with cultures NG at 24 hrs.  4/30: Had a bronch due to hypoxia and hypotension. CXR with worsening right hemithorax pulmonary opacities. Bloody mucous plugs removed. Pressors started. Febrile this am. Tachy in 130s. Pressors just removed. Tolerating vent, but not a SBT candidate at the moment.  5/1: Small air leak CT-2 every ~ 2/3 breathes. The K. pneumoniae from her thoracoscopy is sensitive to her cefepime so no antibiotic change needed.   5/2: No air leak today she tolerated 2  hrs of spontaneous breathing with support today.      5/3: Second day with SBT and doing well now for 2 hrs. Slight bump in temperature and      WBC's but no obvious clinical infectious changed so watch closely.      5/4: Fever still from time to time. WBCs trending up. Chest tubes in place. Trach.       5/5: She clearly is better today she was sitting up in bed with open eyes and follows commands. She still has low grade fever but her WBC's are dropping. Cefepime dose increased yesterday for increased CNS coverage if necessary. She will probably need further thoracic surgery as mentioned by Dr. Ganser. The issue of what to do with her stimulator is still up in the air for now as it probably will need to be removed but she is nort a candidate for more major surgery at this time.       5/7: She continues to improve and under go longer SBT trials. She just had a new PICC placed in her right arm and plan is to D/C central line.        5/8: PICC placed. No  fevers. Comfortable. Cortrak placed. Failing SBTs.  5/9: No acute issues. No fevers. Comfortable. Family at bedside.   5/10: No acute issues, fevers or WBC bumps. No changes in condition. She is to get her CT scan today and be reviewed by surgery  5/11:  at bedside.  Has been referred to LANE.  Repeat CT scan completed.  Results noted.  ? If Dr Ganser has seen.  Still with an empyema:  ? If candidate for further surgery.  5/12: Pending next thoracic surgery. No fever. Anxious.  5/13: ? Surgery date.  No one seems to know.  Had speaking valve in and having swallow eval with speech therapy  5/14: No acute issues. No fevers. Surgery Monday.  5/15: One CT accidentally pulled out yesterday.  Has been on T piece and tolerating. Plans for surgery Monday afternoon.  5/16: On schedule for surgery 5/17: 4:30 pm.  Moved to room T614.  No new issues.  5/17: Was sitting up in the bedside chair since change of shift.  Now walking in hallways with RN and CNA.  5/18: Decortication yesterday with 2 chest tube placement. WBC improving 21->14k  5/19: Hypotension and tachycardia.  Decreased H/H.  Placed back on vent to rest.  Dr Lozano in process of placing new line.  Antibiotics were deesculated yesterday to Cefazolin.  No cx were taken at surgery.  5/20: Pt was febrile yest afternoon 38.2, but now afebrile overnight after meropenem. WBC improved 11k->8k. Fio2 30%. Phenylephrine now off this morning. CXR shows new R consolidations.   5/21: Off pressors.  On vent: FiO2 30%, PEEP 8, PS 5 Received pRBCs yesterday.  5/22: Remains afebrile, off pressors. FiO2 30%.   5/23: Afebrile. FiO2 30%. Bronchoscopy yesterday normal.   5/24: No new cultures obtained at last bronchoscopy.  On  T piece this am: FiO2 30%.  5/25: Remaining afebrile. WBC 8600.   5/26: Chest tube removed today. Possible transfer to [facility].  5/27: O2 sat usuallyl 95% but intermittent desat to 87-89%.  Remaining afebrile.WBC 6100. Alk phos 641 but normal transaminases.    5/28: Pending [facility] transfer. NO new issues overnight  5/29: Awaiting bed at [facility]. Remaining afebrile. WBC 5800, creat 0.19.  5/31. Remaining afebrile. WBC 6000. . Remains weak and frail. Alk Phos 694 with normal transaminases. Slightly improved bilateral patchy infiltrates.   6/1: She is anxious to move on but still waiting for insurance approval to [facility].  6/2: Still awaiting bed at [facility]. Remaining afebrile. WBC 8300. Alk Phos 705 but transaminases normal and bilirubine 0.4.  6/4: Awaiting insurance auth before bed can be assigned at [facility]. Remaining afebrile. Extensive folliculitis over back and buttocks.  WBC 8300 on 6/2. Last  on 5/31.  6/5: Her ESR is down to 53 from 102 a good sign hopefully. She continues to work on her strength by walking.  6/6: Speech evaluation to check her swallow as she is stronger and can't stand pureed.    Interval History:  Past 24 hrs reviewed with RN.    Labs Reviewed, Medications Reviewed, Radiology Reviewed, Wound Reviewed, Fluids Reviewed and GI Nutrition Reviewed    Review of Systems:  Review of Systems   Constitutional: Negative for chills and fever.   Respiratory: Negative for cough and shortness of breath.    Cardiovascular: Negative for chest pain.   Gastrointestinal: Negative for abdominal pain, constipation, diarrhea, nausea and vomiting.   Genitourinary: Negative for dysuria.   Skin: Positive for itching (minor).       Physical Exam:  Physical Exam  Vitals and nursing note reviewed.   Constitutional:       General: She is not in acute distress.     Appearance: Normal appearance. She is not ill-appearing, toxic-appearing or diaphoretic.   HENT:      Head: Normocephalic and atraumatic.   Cardiovascular:      Rate and Rhythm: Normal rate and regular rhythm.      Heart sounds: No murmur heard.     Pulmonary:      Effort: Pulmonary effort is normal. No respiratory distress.      Breath sounds: No wheezing or rhonchi.      Comments: BS decreased on right vs left but  clear.  Abdominal:      General: Abdomen is flat. There is no distension.      Palpations: Abdomen is soft.      Tenderness: There is no abdominal tenderness. There is no guarding or rebound.   Skin:     General: Skin is warm and dry.   Neurological:      Mental Status: She is alert.   Psychiatric:         Behavior: Behavior normal.      Comments: Up beat.         Labs:  Recent Results (from the past 24 hour(s))   CBC WITHOUT DIFFERENTIAL    Collection Time: 06/06/21  4:00 AM   Result Value Ref Range    WBC 4.5 (L) 4.8 - 10.8 K/uL    RBC 3.40 (L) 4.20 - 5.40 M/uL    Hemoglobin 9.5 (L) 12.0 - 16.0 g/dL    Hematocrit 30.8 (L) 37.0 - 47.0 %    MCV 90.6 81.4 - 97.8 fL    MCH 27.9 27.0 - 33.0 pg    MCHC 30.8 (L) 33.6 - 35.0 g/dL    RDW 52.2 (H) 35.9 - 50.0 fL    Platelet Count 290 164 - 446 K/uL    MPV 10.1 9.0 - 12.9 fL   Comp Metabolic Panel    Collection Time: 06/06/21  4:00 AM   Result Value Ref Range    Sodium 134 (L) 135 - 145 mmol/L    Potassium 3.5 (L) 3.6 - 5.5 mmol/L    Chloride 97 96 - 112 mmol/L    Co2 29 20 - 33 mmol/L    Anion Gap 8.0 7.0 - 16.0    Glucose 66 65 - 99 mg/dL    Bun 6 (L) 8 - 22 mg/dL    Creatinine 0.22 (L) 0.50 - 1.40 mg/dL    Calcium 8.9 8.5 - 10.5 mg/dL    AST(SGOT) 7 (L) 12 - 45 U/L    ALT(SGPT) 5 2 - 50 U/L    Alkaline Phosphatase 473 (H) 30 - 99 U/L    Total Bilirubin 0.3 0.1 - 1.5 mg/dL    Albumin 2.7 (L) 3.2 - 4.9 g/dL    Total Protein 7.1 6.0 - 8.2 g/dL    Globulin 4.4 (H) 1.9 - 3.5 g/dL    A-G Ratio 0.6 g/dL   MAGNESIUM    Collection Time: 06/06/21  4:00 AM   Result Value Ref Range    Magnesium 1.5 1.5 - 2.5 mg/dL   PHOSPHORUS    Collection Time: 06/06/21  4:00 AM   Result Value Ref Range    Phosphorus 4.9 (H) 2.5 - 4.5 mg/dL   ESTIMATED GFR    Collection Time: 06/06/21  4:00 AM   Result Value Ref Range    GFR If African American >60 >60 mL/min/1.73 m 2    GFR If Non African American >60 >60 mL/min/1.73 m 2     Results     ** No results found for the last 168 hours. **         Hemodynamics:  Temp (24hrs), Av.4 °C (97.5 °F), Min:36.1 °C (97 °F), Max:36.7 °C (98 °F)  Temperature: 36.5 °C (97.7 °F)  Pulse  Av.2  Min: 40  Max: 220   Blood Pressure: (!) 99/57    PICC Double Lumen 21 Lumen 1: Purple Lumen 2: Red Right Brachial (Active)   Site Assessment Clean;Dry;Intact 21   Lumen 1 Status Flushed;Scrubbed the hub prior to access;Normal saline locked;Blood return noted 21   Lumen 2 Status Flushed;Scrubbed the hub prior to access;Normal saline locked;Blood return noted 21   Line Secured at (cm) 1 cm 21   Extremity Circumference (cm) 30.5 cm 21   Dressing Type Biopatch;Occlusive;Securing device;Transparent 21   Dressing Status Clean;Dry;Intact 21   Dressing Intervention Dressing changed per protocol 21   Dressing Change Due 06/12/21 06/05/21 2000   Date Primary Tubing Changed 06/05/21 06/05/21 2000   Date Secondary Tubing Changed 06/05/21 06/05/21 2000   Date IV Connector(s) Changed 21   NEXT Primary Tubing Change  21   NEXT Secondary Tubing Change  21   NEXT IV Connector(s) Change 21   Line Necessity Assessed Antibiotic Therapy Greater than 7 Days 21   $ Double Lumen PICC Charge Single kit used 21 1118     Wound:  @WOUNDLDA(4)@     Fluids:  Intake/Output                             21 - 2159 21 - 2159 21 - 2159     1900-0659 Total 7473-1502 5318-5894 Total 6332-8205 9179-0947 Total                    Intake    P.O.  --  60 60  --  60 60  120  -- 120    P.O. -- 60 60 -- 60 60 120 -- 120    Total Intake -- 60 60 -- 60 60 120 -- 120       Output    Urine  --  400 400  --  -- --  --  -- --    Number of Times Voided 1 x -- 1 x -- -- -- -- -- --    Urine Void (mL) -- 400 400 -- -- -- -- -- --    Stool  --  -- --  --  -- --  --  --  --    Number of Times Stooled -- 0 x 0 x -- 0 x 0 x -- -- --    Total Output -- 400 400 -- -- -- -- -- --       Net I/O     -- -340 -340 -- 60 60 120 -- 120        Weight: 61.3 kg (135 lb 2.3 oz)  GI/Nutrition:  Orders Placed This Encounter   Procedures   • Diet Order Diet: Level 4 - Pureed (speaking valve on for all PO intake); Liquid level: Level 2 - Mildly Thick; Tray Modifications (optional): No Straws, SLP - 1:1 Supervision by Nursing, SLP - Deliver to Nursing Station     Standing Status:   Standing     Number of Occurrences:   1     Order Specific Question:   Diet:     Answer:   Level 4 - Pureed [25]     Comments:   speaking valve on for all PO intake     Order Specific Question:   Liquid level     Answer:   Level 2 - Mildly Thick     Order Specific Question:   Tray Modifications (optional)     Answer:   No Straws     Order Specific Question:   Tray Modifications (optional)     Answer:   SLP - 1:1 Supervision by Nursing     Order Specific Question:   Tray Modifications (optional)     Answer:   SLP - Deliver to Nursing Station     Medications:  Current Facility-Administered Medications   Medication Last Admin   • potassium chloride SA (Kdur) tablet 20 mEq 20 mEq at 06/06/21 0801   • magnesium oxide (MAG-OX) tablet 400 mg 400 mg at 06/06/21 0801   • nystatin (MYCOSTATIN) powder Given at 06/06/21 1518   • diphenhydrAMINE (BENADRYL) injection 50 mg 50 mg at 06/06/21 1538   • acetaminophen (TYLENOL) tablet 500 mg 500 mg at 06/06/21 1518   • divalproex (DEPAKOTE SPRINKLE) capsule 500 mg 500 mg at 06/06/21 1517   • DULoxetine (CYMBALTA) capsule 60 mg 60 mg at 06/06/21 0622   • gabapentin (NEURONTIN) capsule 300 mg 300 mg at 06/06/21 1517   • methadone (DOLOPHINE) tablet 30 mg 30 mg at 06/06/21 0622   • PARoxetine (PAXIL) tablet 10 mg 10 mg at 06/06/21 0623   • risperiDONE (RISPERDAL) tablet 2 mg 2 mg at 06/06/21 0623   • senna-docusate (PERICOLACE or SENOKOT S) 8.6-50 MG per tablet 2 tablet 2 tablet at 06/06/21 0693     And   • polyethylene glycol/lytes (MIRALAX) PACKET 1 Packet      And   • magnesium hydroxide (MILK OF MAGNESIA) suspension 30 mL      And   • bisacodyl (DULCOLAX) suppository 10 mg     • acetaminophen (Tylenol) tablet 650 mg 650 mg at 05/30/21 0457   • diazePAM (VALIUM) tablet 5 mg 5 mg at 06/03/21 2134   • oxyCODONE immediate-release (ROXICODONE) tablet 5 mg 5 mg at 06/06/21 1526   • ceFAZolin in dextrose (ANCEF) IVPB premix 2 g 2 g at 06/06/21 1517   • enoxaparin (LOVENOX) inj 40 mg 40 mg at 06/06/21 0621   • Respiratory Therapy Consult     • ondansetron (ZOFRAN) syringe/vial injection 4 mg 4 mg at 06/02/21 0703   • labetalol (NORMODYNE/TRANDATE) injection 10 mg 10 mg at 05/04/21 0226   • ipratropium-albuterol (DUONEB) nebulizer solution       Medical Decision Making, by Problem:  Active Hospital Problems    Diagnosis    • *Empyema of right pleural space (HCC) [J86.9]    • Shock (HCC) [R57.9]    • Debility [R53.81]    • Elevated alkaline phosphatase level [R74.8]    • Agitation requiring sedation protocol [R45.1]    • Spinal cord stimulator status [Z96.89]    • Goals of care, counseling/discussion [Z71.89]    • Fever [R50.9]    • Pulmonary abscess (HCC) [J85.2]    • 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]    • Pleural effusion, left [J90]    • Anasarca [R60.1]    • Thrombocytosis (HCC) [D47.3]    • Atelectasis [J98.11]    • Acute respiratory failure with hypoxia (HCC) [J96.01]    • Prolonged Q-T interval on ECG [R94.31]    • Acute bacterial endocarditis [I33.0]    • CSF pleocytosis [D72.9]    • Bacteremia due to methicillin susceptible Staphylococcus aureus (MSSA) [R78.81, B95.61]    • History of vasculitis [Z86.79]    • Pneumonia [J18.9]    • History of complicated migraines [G43.109]    • GERD (gastroesophageal reflux disease) [K21.9]    • Hyponatremia [E87.1]    • Tachycardia [R00.0]    • Pseudotumor cerebri [G93.2]    • H/O: CVA (cerebrovascular accident) [Z86.73]    • Chronic pain syndrome [G89.4]    • Port or reservoir  infection [T80.212A]    • Thrombocytopenia (HCC) [D69.6]    • Septic shock (HCC) [A41.9, R65.21]    • Anemia [D64.9]    • Sepsis (HCC) [A41.9]    • Hypokalemia [E87.6]    • Hypomagnesemia [E83.42]    • S/P  shunt [Z98.2]    • Anxiety [F41.9]    • Folliculitis [L73.9]    • Acute encephalopathy [G93.40]      Impression   -Severe sepsis  -Catheter related bloodstream infection due to infected port status post removal 4/12-staph aureus  -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       - Acute left empyema s/p chest tube placement 4/23, decortication 5/17, chest tube removal 5/26 - Klebsiella  -Multiple acute septic pulmonary emboli bilaterally  -Acute bilateral pneumonia due to above     --Pulmonary edema  -Pseudotumor cerebri with underlying  shunt: possible arachnoiditis  -Chronic migraine headaches  -History of autoimmune vasculitis  -Elevated alkaline phosphatase & bilirubin  -Acute encephalopathy due to sepsis      - anemia due to above          - acute fever on 5/19 likely from secondary VAP          - Secondary VAP R sided pneumonia          - decubitus ulcer sacral.      Plan   Tracheal aspirate recently 5/19 no growth, blood cultures 5/19 no growth. CXR shows worsening R sided infiltrates concerning for secondary infections after reviewing her CXR's between 5/18 and      5/19.  Repeated sputum cultures still reflecting her original Klebsiella empyema infection. She improved post operatively with defervescence and pressor weaning while on meropenem. Suggestive of either time, or an anaerobic infection that we weren't covering. Continued meropenem for 7 to 8 days, then switched back to cefazolin on 5/26 to complete therapy for endocarditis.  - Clearly needs aggressive physical therapy.  - Continue cefazolin 4 week target date after decortication 6/14/21  - Received Meropenem for VAP for 7  days finished 5/26  - Will need repeat Chest CT right before completion of antibiotics  - after completion of IV abx  Will need suppression therapy afterward for her HW (spinal stimulator,  shunt  targeting MSSA with Keflex, for example).    - chest tube removed 5/26  - ESR, CRP weekly Saturday  Dispo: Pt to transfer to [facility]. Micheal will follow patient there. Awaiting on bed availability     She is still waiting for insurance approval to [facility]. Continue with antibiotics IV as planned 8 more days then to PO chronic suppression.  Speech evaluation to check her swallow as she is stronger and can't stand pureed. Case and treatment reviewed with patient, , micro, RN notes 30 min on floor with 50% face to face view and 100% in direct patient care/counseling with new orders today.

## 2021-06-06 NOTE — PROGRESS NOTES
4 Eyes Skin Assessment Completed by CAROLINA Schmidt and CAROLINA Alan.    Head WDL  Ears Redness and Blanching  Nose WDL  Mouth Dry  Neck Redness and Blanching,   L anterior neck near trach - healing, slow to ty red [place] (picture uploaded to media)  Breast/Chest WDL   Moisture under bilateral breasts  Right chest dressing site in place - CDI  Shoulder Blades WDL  Spine WDL  (R) Arm/Elbow/Hand Redness and Blanching   R wrist scab  (L) Arm/Elbow/Hand Redness and Blanching  Abdomen WDL  Groin Redness, Blanching and Excoriation/Open - picture uploaded to media  Scrotum/Coccyx/Buttocks Redness and Blanching/Open/Excoriation - picture uploaded to media  (R) Leg WDL  (L) Leg WDL  (R) Heel/Foot/Toe Redness, Blanching and Boggy   R lateral ankle redness, slow to ty  R heel red blanchable indentation  R 2nd knuckle red & slow to ty  R great toe - DTI  (L) Heel/Foot/Toe Redness, Blanching and Scar, Boggy  L 3rd toe DTI           Devices In Places Tracheostomy, glasses      Interventions In Place InterDry, Heel Mepilex, Pillows, Q2 Turns, ZFlo Pillow, Heels Loaded W/Pillows and Pressure Redistribution Mattress    Possible Skin Injury Yes    Pictures Uploaded Into Epic Yes  Wound Consult Placed Yes - following  RN Wound Prevention Protocol Ordered Yes

## 2021-06-06 NOTE — PROGRESS NOTES
Infectious Disease Progress Note    Author: Brian Omer M.D. Date & Time created: 6/5/2021  6:00 PM     Chief Complaint  Cefazolin 5/26-10 Total Therapy Day #15 on 4 Week Course     Meropenem 5/19-5/26   Cefepime 4/23 5/18-  s/p 23 days  Cefazolin 5/18-5/19     Thoracoscopy & Decortication - 4/28  Decortication 5/17     PRIOR Rx:   Zosyn 4/22-4/23  Previous: Unasyn, Zosyn, Vanco, Daptomycin  Ceftaroline: start 4/13-4/15  Nafcillin 2g Q4 hrs 4/15-4/23 4/14: Unable to give name of previous NSG or neurologist. Seems confused, but able to say some sentences fluently.   4/15: Line cultures now growing MSSA. As well as from the blood. Repeat blood culture 4/13+ in both sets. Patient has worsening confusion. CSF fluid analyses suggest pleocytosis. Cultures are no growth from the CSF. Chest CT was ordered this morning indicating a loculated right pleural effusion as well as bilateral septic emboli. Dr. Mack spoke with Dr. Oh yesterday and no surgical intervention unless clinical deterioration.  4/16: Increased respiratory distress.  Tachypneic.  CT with loculated collection.  Dr Resendiz not available.  No thoracic surgeon available.  Plans to have pulmonary place CT.  Transferring to ICU.  4/17: Transferred to [facility] last night. Hgb dropped to 6.4 requiring PRBC transfusion. Requiring 2 pressors. Fio2 50%. Febrile 38.8. Pending OR decortication of empyema and hemothorax. Chest tube placed at [facility] shows 8,371 WBC, ,000, 74% N  4/18: Chest tube was upsized by surgery yesterday, no decortication. WBC 22k. Fio2 40%. Vasopressin. Levophed down to 2mcg. Afebrile 24 hrs. Last fever 38.6 on 4/16.   4/19: Still on vent. Levo 3 mcg, vasopressin. Afebrile 72 hours. WBC 21k. BC 4/17 NGTD x 48 hours. Unable to do MRI brain given neurostimulator per RN.   4/20: Remaining afebrile. Hb 6.2 and undergoing transfusion today.WBC 24,100, 5% bands.1700 ml CT output. Copious bloody ET secretions. No pressors. Receiving  dornase and TPA per CT. Right pleural effusion not large per CXR today.   4/21: WBC 31k. Bronchoscopy yesterday showing blood. Cultures sent. TPA on hold per RN due to arvind blood from chest tube requiring PRBC transfusion for hgb 6. Pending chest CT today. Per , spinal stimulator is non-MRI compatible. Levophed 10mcg. 30% Fio2. Afebrile.   4/22: Fever 101.3 this am. WBC 20,300 down from 32,200 yesterday. BAL growing Klebsiella pneumoniae but susceptibility panel not set up until today. CTA of brain shows no lesion. CT of chest shows enlarging cavitary lung lesions bilat and large loculated new left pleural effusion and large hydropneumothorax on right. TPA & dornase infusions of right chest ended 4/20 after considerable bleeding requiring transfusion. Hb now down again to 6.8.  4/23: WBC 23k. Fio2 40%. Tmax 38.1. US of stiumlator shows small fluid collection but no drainable abscess. Pending MICAH today. Pending L chest tube placement  4/24: Continue fever 100.8-101.8. WBC 16,600. MICAH shows large vegetations on both tricuspid valve and mitral valve with mild TR & MR.  No aortic root abscess. Left chest tube placed yesterday yielding 8 ml of old bloody fluid. Bronch today revealed copious thick maroon secretions in distal trachea and both mainstem bronchi. On the right these were obstructive. Remaining off pressors. Last positive blood cultures (MSSA) were 4/16, negative 4/17.  4/25: Fever 100.6 this AM. wBC 13,300. Hb 6.6. CT: right hydropneumothorax increasing, right bronchopleural fistula, mediastinal shift ot the left , multiple cavitary pulmonary nodules, 3.1 cm left basilar mass, splenomegaly. CT of head shows dural thickening over the clivus. Lac+ GNR >100,000 col/ml growing from BAL of 4/24, presumed Klebsiella. Left peural fluid culture negative from 4/23.  4/26: Fever 100.4 last night. WBC 13,500. Hb 7.4. Plts 502,000. ESR >120. UA fairly clear except 30 mg% protein, 2-5 wbc and rare rbc. CXR  unchanged except more prominent pulmonary edema. GNR in BAL may be a different species of Klebsiella, and resistant to ampicillin & tmp-sulfa; confirmation pending.  4/28: Fever 100.8 last night. WBC 27,700. Hb 6.6. Plts 482,000. Creat 0.19. Kleb pneum in BAL on 4/24 is resistant to ampicillin & tmp-sulfa but otherwise sensitive. O2 sat 96% on FIO2 30% now, PEEP 8. Has been re-evaluated by thoracic surgeon, Dr. Ganser, who believes she will not wean without decortication on the right. Surgery anticipated today.  4/29: S/P right thoracoscopy with decortication with cultures NG at 24 hrs.  4/30: Had a bronch due to hypoxia and hypotension. CXR with worsening right hemithorax pulmonary opacities. Bloody mucous plugs removed. Pressors started. Febrile this am. Tachy in 130s. Pressors just removed. Tolerating vent, but not a SBT candidate at the moment.  5/1: Small air leak CT-2 every ~ 2/3 breathes. The K. pneumoniae from her thoracoscopy is sensitive to her cefepime so no antibiotic change needed.   5/2: No air leak today she tolerated 2  hrs of spontaneous breathing with support today.      5/3: Second day with SBT and doing well now for 2 hrs. Slight bump in temperature and      WBC's but no obvious clinical infectious changed so watch closely.      5/4: Fever still from time to time. WBCs trending up. Chest tubes in place. Trach.       5/5: She clearly is better today she was sitting up in bed with open eyes and follows commands. She still has low grade fever but her WBC's are dropping. Cefepime dose increased yesterday for increased CNS coverage if necessary. She will probably need further thoracic surgery as mentioned by Dr. Ganser. The issue of what to do with her stimulator is still up in the air for now as it probably will need to be removed but she is nort a candidate for more major surgery at this time.       5/7: She continues to improve and under go longer SBT trials. She just had a new PICC placed in her  right arm and plan is to D/C central line.        5/8: PICC placed. No fevers. Comfortable. Cortrak placed. Failing SBTs.  5/9: No acute issues. No fevers. Comfortable. Family at bedside.   5/10: No acute issues, fevers or WBC bumps. No changes in condition. She is to get her CT scan today and be reviewed by surgery  5/11:  at bedside.  Has been referred to LANE.  Repeat CT scan completed.  Results noted.  ? If Dr Ganser has seen.  Still with an empyema:  ? If candidate for further surgery.  5/12: Pending next thoracic surgery. No fever. Anxious.  5/13: ? Surgery date.  No one seems to know.  Had speaking valve in and having swallow eval with speech therapy  5/14: No acute issues. No fevers. Surgery Monday.  5/15: One CT accidentally pulled out yesterday.  Has been on T piece and tolerating. Plans for surgery Monday afternoon.  5/16: On schedule for surgery 5/17: 4:30 pm.  Moved to room T614.  No new issues.  5/17: Was sitting up in the bedside chair since change of shift.  Now walking in hallways with RN and CNA.  5/18: Decortication yesterday with 2 chest tube placement. WBC improving 21->14k  5/19: Hypotension and tachycardia.  Decreased H/H.  Placed back on vent to rest.  Dr Lozano in process of placing new line.  Antibiotics were deesculated yesterday to Cefazolin.  No cx were taken at surgery.  5/20: Pt was febrile yest afternoon 38.2, but now afebrile overnight after meropenem. WBC improved 11k->8k. Fio2 30%. Phenylephrine now off this morning. CXR shows new R consolidations.   5/21: Off pressors.  On vent: FiO2 30%, PEEP 8, PS 5 Received pRBCs yesterday.  5/22: Remains afebrile, off pressors. FiO2 30%.   5/23: Afebrile. FiO2 30%. Bronchoscopy yesterday normal.   5/24: No new cultures obtained at last bronchoscopy.  On  T piece this am: FiO2 30%.  5/25: Remaining afebrile. WBC 8600.   5/26: Chest tube removed today. Possible transfer to [facility]  5/27: O2 sat usuallyl 95% but intermittent desat to 87-89%.   Remaining afebrile.WBC 6100. Alk phos 641 but normal transaminases.   5/28: Pending [facility] transfer. NO new issues overnight  5/29: Awaiting bed at [facility]. Remaining afebrile. WBC 5800, creat 0.19.  5/31. Remaining afebrile. WBC 6000. . Remains weak and frail. Alk Phos 694 with normal transaminases. Slightly improved bilateral patchy infiltrates.   6/1: She is anxious to move on but still waiting for insurance approval to [facility].  6/2: Still awaiting bed at [facility]. Remaining afebrile. WBC 8300. Alk Phos 705 but transaminases normal and bilirubine 0.4.  6/4: Awaiting insurance auth before bed can be assigned at [facility]. Remaining afebrile. Extensive folliculitis over back and buttocks.  WBC 8300 on 6/2. Last  on 5/31.  6/5: Her ESR is down to 53 from 102 a good sign hopefully. She continues to work on her strength by walking.    Interval History:  Past 24 hrs RN notes reviewed    Labs Reviewed, Medications Reviewed, Radiology Reviewed, Wound Reviewed, Fluids Reviewed and GI Nutrition Reviewed    Review of Systems:  Review of Systems   Constitutional: Negative for chills and fever.   Respiratory: Negative for cough and shortness of breath.    Cardiovascular: Negative for chest pain.   Gastrointestinal: Negative for abdominal pain, constipation, diarrhea, nausea and vomiting.   Genitourinary: Negative for dysuria.   Skin: Positive for itching (by tail bone area).   Neurological: Positive for weakness (but getting better).       Physical Exam:  Physical Exam  Vitals and nursing note reviewed.   Constitutional:       General: She is not in acute distress.     Appearance: She is not ill-appearing, toxic-appearing or diaphoretic.   HENT:      Head: Normocephalic and atraumatic.   Cardiovascular:      Rate and Rhythm: Normal rate and regular rhythm.      Heart sounds: No murmur heard.     Pulmonary:      Effort: Pulmonary effort is normal. No respiratory distress.      Breath sounds: Normal breath sounds. No wheezing or  rhonchi.   Abdominal:      General: Abdomen is flat. There is no distension.      Palpations: Abdomen is soft.      Tenderness: There is no abdominal tenderness. There is no rebound.   Skin:     General: Skin is warm and dry.   Neurological:      Mental Status: She is alert and oriented to person, place, and time.   Psychiatric:         Mood and Affect: Mood normal.         Behavior: Behavior normal.      Comments: Still slow speech and slightly flat affect.         Labs:  Recent Results (from the past 24 hour(s))   Sed Rate    Collection Time: 21 12:15 AM   Result Value Ref Range    Sed Rate Westergren 53 (H) 0 - 25 mm/hour   CRP QUANTITIVE (NON-CARDIAC)    Collection Time: 21 12:15 AM   Result Value Ref Range    Stat C-Reactive Protein 6.54 (H) 0.00 - 0.75 mg/dL     Results     ** No results found for the last 168 hours. **        Hemodynamics:  Temp (24hrs), Av.8 °C (98.3 °F), Min:36.4 °C (97.6 °F), Max:37.2 °C (98.9 °F)  Temperature: 37.2 °C (98.9 °F)  Pulse  Av.3  Min: 40  Max: 220   Blood Pressure: (!) 97/60    PICC Double Lumen 21 Lumen 1: Purple Lumen 2: Red Right Brachial (Active)   Site Assessment Clean;Dry;Intact 21 075   Lumen 1 Status Flushed;Scrubbed the hub prior to access;Normal saline locked;Blood return noted 21 075   Lumen 2 Status Flushed;Scrubbed the hub prior to access;Normal saline locked;Blood return noted 21 075   Line Secured at (cm) 1 cm 21 1118   Extremity Circumference (cm) 30.5 cm 21 1118   Dressing Type Biopatch;Occlusive;Securing device;Transparent 21 075   Dressing Status Clean;Dry;Intact 21 075   Dressing Intervention N/A 21 075   Dressing Change Due 21 0810   Date Primary Tubing Changed 21 1300   Date Secondary Tubing Changed 21 1300   Date IV Connector(s) Changed 21 2100   NEXT Primary Tubing Change  21 1121   NEXT Secondary  Tubing Change  06/05/21 06/02/21 1121   NEXT IV Connector(s) Change 05/30/21 05/29/21 2100   Line Necessity Assessed Antibiotic Therapy Greater than 7 Days 06/04/21 2000   $ Double Lumen PICC Charge Single kit used 05/07/21 1118     Wound:  @WOUNDLDA(4)@     Fluids:  Intake/Output                             06/03/21 0700 - 06/04/21 0659 06/04/21 0700 - 06/05/21 0659 06/05/21 0700 - 06/06/21 0659     0700-1859 5128-2881 Total 0700-1859 5364-6482 Total 9373-9740 1425-7348 Total                    Intake    P.O.  --  -- --  --  60 60  --  -- --    P.O. -- -- -- -- 60 60 -- -- --    Total Intake -- -- -- -- 60 60 -- -- --       Output    Urine  750  -- 750  --  400 400  --  -- --    Number of Times Voided 3 x -- 3 x 1 x -- 1 x -- -- --    Urine Void (mL) -- -- -- -- 400 400 -- -- --    Output (mL) (External Urinary Catheter (Female Wick)) 750 -- 750 -- -- -- -- -- --    Stool  --  -- --  --  -- --  --  -- --    Number of Times Stooled 1 x -- 1 x -- 0 x 0 x -- -- --    Total Output 750 -- 750 -- 400 400 -- -- --       Net I/O     -750 -- -750 -- -340 -340 -- -- --        Weight: 61.1 kg (134 lb 11.2 oz)  GI/Nutrition:  Orders Placed This Encounter   Procedures   • Diet Order Diet: Level 4 - Pureed (speaking valve on for all PO intake); Liquid level: Level 2 - Mildly Thick; Tray Modifications (optional): No Straws, SLP - 1:1 Supervision by Nursing, SLP - Deliver to Nursing Station     Standing Status:   Standing     Number of Occurrences:   1     Order Specific Question:   Diet:     Answer:   Level 4 - Pureed [25]     Comments:   speaking valve on for all PO intake     Order Specific Question:   Liquid level     Answer:   Level 2 - Mildly Thick     Order Specific Question:   Tray Modifications (optional)     Answer:   No Straws     Order Specific Question:   Tray Modifications (optional)     Answer:   SLP - 1:1 Supervision by Nursing     Order Specific Question:   Tray Modifications (optional)     Answer:   SLP - Deliver  to Nursing Station     Medications:  Current Facility-Administered Medications   Medication Last Admin   • nystatin (MYCOSTATIN) powder Given at 06/05/21 1635   • diphenhydrAMINE (BENADRYL) injection 50 mg 50 mg at 06/05/21 1427   • acetaminophen (TYLENOL) tablet 500 mg 500 mg at 06/05/21 1427   • divalproex (DEPAKOTE SPRINKLE) capsule 500 mg 500 mg at 06/05/21 1427   • DULoxetine (CYMBALTA) capsule 60 mg 60 mg at 06/05/21 1635   • gabapentin (NEURONTIN) capsule 300 mg 300 mg at 06/05/21 1428   • methadone (DOLOPHINE) tablet 30 mg 30 mg at 06/05/21 0612   • PARoxetine (PAXIL) tablet 10 mg 10 mg at 06/05/21 0612   • risperiDONE (RISPERDAL) tablet 2 mg 2 mg at 06/05/21 1635   • senna-docusate (PERICOLACE or SENOKOT S) 8.6-50 MG per tablet 2 tablet 2 tablet at 06/05/21 0613    And   • polyethylene glycol/lytes (MIRALAX) PACKET 1 Packet      And   • magnesium hydroxide (MILK OF MAGNESIA) suspension 30 mL      And   • bisacodyl (DULCOLAX) suppository 10 mg     • acetaminophen (Tylenol) tablet 650 mg 650 mg at 05/30/21 0457   • diazePAM (VALIUM) tablet 5 mg 5 mg at 06/03/21 2134   • oxyCODONE immediate-release (ROXICODONE) tablet 5 mg 5 mg at 06/05/21 1427   • ceFAZolin in dextrose (ANCEF) IVPB premix 2 g Stopped at 06/05/21 1458   • enoxaparin (LOVENOX) inj 40 mg 40 mg at 06/05/21 0612   • Respiratory Therapy Consult     • ondansetron (ZOFRAN) syringe/vial injection 4 mg 4 mg at 06/02/21 0703   • labetalol (NORMODYNE/TRANDATE) injection 10 mg 10 mg at 05/04/21 0226   • ipratropium-albuterol (DUONEB) nebulizer solution       Medical Decision Making, by Problem:  Active Hospital Problems    Diagnosis    • *Empyema of right pleural space (HCC) [J86.9]    • Shock (HCC) [R57.9]    • Debility [R53.81]    • Elevated alkaline phosphatase level [R74.8]    • Agitation requiring sedation protocol [R45.1]    • Spinal cord stimulator status [Z96.89]    • Goals of care, counseling/discussion [Z71.89]    • Fever [R50.9]    • Pulmonary  abscess (HCC) [J85.2]    • 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]    • Pleural effusion, left [J90]    • Anasarca [R60.1]    • Thrombocytosis (HCC) [D47.3]    • Atelectasis [J98.11]    • Acute respiratory failure with hypoxia (HCC) [J96.01]    • Prolonged Q-T interval on ECG [R94.31]    • Acute bacterial endocarditis [I33.0]    • CSF pleocytosis [D72.9]    • Bacteremia due to methicillin susceptible Staphylococcus aureus (MSSA) [R78.81, B95.61]    • History of vasculitis [Z86.79]    • Pneumonia [J18.9]    • History of complicated migraines [G43.109]    • GERD (gastroesophageal reflux disease) [K21.9]    • Hyponatremia [E87.1]    • Tachycardia [R00.0]    • Pseudotumor cerebri [G93.2]    • H/O: CVA (cerebrovascular accident) [Z86.73]    • Chronic pain syndrome [G89.4]    • Port or reservoir infection [T80.212A]    • Thrombocytopenia (HCC) [D69.6]    • Septic shock (HCC) [A41.9, R65.21]    • Anemia [D64.9]    • Sepsis (HCC) [A41.9]    • Hypokalemia [E87.6]    • Hypomagnesemia [E83.42]    • S/P  shunt [Z98.2]    • Anxiety [F41.9]    • Folliculitis [L73.9]    • Acute encephalopathy [G93.40]      Impression   -Severe sepsis  -Catheter related bloodstream infection due to infected port status post removal 4/12-staph aureus  -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       - Acute left empyema s/p chest tube placement 4/23, decortication 5/17, chest tube removal 5/26 - Klebsiella  -Multiple acute septic pulmonary emboli bilaterally  -Acute bilateral pneumonia due to above     --Pulmonary edema  -Pseudotumor cerebri with underlying  shunt: possible arachnoiditis  -Chronic migraine headaches  -History of autoimmune vasculitis  -Elevated alkaline phosphatase & bilirubin  -Acute encephalopathy due to sepsis      - anemia due to above          - acute fever on 5/19 likely from secondary VAP          - Secondary VAP R sided  pneumonia          - decubitus ulcer sacral.      Plan   Tracheal aspirate recently 5/19 no growth, blood cultures 5/19 no growth. CXR shows worsening R sided infiltrates concerning for secondary infections after reviewing her CXR's between 5/18 and      5/19.  Repeated sputum cultures still reflecting her original Klebsiella empyema infection. She improved post operatively with defervescence and pressor weaning while on meropenem. Suggestive of either time, or an anaerobic infection that we weren't covering. Continued meropenem for 7 to 8 days, then switched back to cefazolin on 5/26 to complete therapy for endocarditis.  - Clearly needs aggressive physical therapy.  - Continue cefazolin 4 week target date after decortication 6/14/21  - Received Meropenem for VAP for 7 days finished 5/26  - Will need repeat Chest CT right before completion of antibiotics  - after completion of IV abx  Will need suppression therapy afterward for her HW (spinal stimulator,  shunt  targeting MSSA with Keflex, for example).    - chest tube removed 5/26  - ESR, CRP weekly Saturday  Dispo: Pt to transfer to \[facility]". Micheal will follow patient there. Awaiting on bed availability    She is still waiting for insurance approval to \[facility]". Continue with antibiotics IV as planned 9 more days then to PO chronic suppression. Case and treatment reviewed with patient, , micro, RN notes 25+ min on floor with 50% face to face view and 100% in direct patient care/counseling today.

## 2021-06-06 NOTE — CARE PLAN
Problem: Aerosol Therapy  Goal: Improved hydration/ability to mobilize secretions and/or decreased airway edema  Description: 1.  Implement heated or cool aerosol therapy  2.  Assessed for optimal hydration, decreased edema and/or improved ability to mobilize secretions  Outcome: Progressing  5lpm/30%     Problem: Bronchopulmonary Hygiene:  Goal: Increase mobilization of retained secretions  Outcome: Progressing     Problem: Oxygenation:  Goal: Maintain adequate oxygenation dependent on patient condition  Outcome: Progressing     Problem: Oxygenation:  Goal: Maintain adequate oxygenation dependent on patient condition  Outcome: Progressing

## 2021-06-07 PROBLEM — N89.8 VAGINAL ITCHING: Status: ACTIVE | Noted: 2021-06-07

## 2021-06-07 LAB
ALBUMIN SERPL BCP-MCNC: 2.7 G/DL (ref 3.2–4.9)
ALBUMIN/GLOB SERPL: 0.7 G/DL
ALP SERPL-CCNC: 414 U/L (ref 30–99)
ALT SERPL-CCNC: <5 U/L (ref 2–50)
ANION GAP SERPL CALC-SCNC: 6 MMOL/L (ref 7–16)
AST SERPL-CCNC: 10 U/L (ref 12–45)
BILIRUB SERPL-MCNC: 0.3 MG/DL (ref 0.1–1.5)
BUN SERPL-MCNC: 4 MG/DL (ref 8–22)
CALCIUM SERPL-MCNC: 8.5 MG/DL (ref 8.5–10.5)
CHLORIDE SERPL-SCNC: 97 MMOL/L (ref 96–112)
CO2 SERPL-SCNC: 28 MMOL/L (ref 20–33)
CREAT SERPL-MCNC: <0.17 MG/DL (ref 0.5–1.4)
GLOBULIN SER CALC-MCNC: 4.1 G/DL (ref 1.9–3.5)
GLUCOSE SERPL-MCNC: 75 MG/DL (ref 65–99)
POTASSIUM SERPL-SCNC: 3.8 MMOL/L (ref 3.6–5.5)
PROT SERPL-MCNC: 6.8 G/DL (ref 6–8.2)
SODIUM SERPL-SCNC: 131 MMOL/L (ref 135–145)

## 2021-06-07 PROCEDURE — A9270 NON-COVERED ITEM OR SERVICE: HCPCS | Performed by: STUDENT IN AN ORGANIZED HEALTH CARE EDUCATION/TRAINING PROGRAM

## 2021-06-07 PROCEDURE — 700102 HCHG RX REV CODE 250 W/ 637 OVERRIDE(OP): Performed by: INTERNAL MEDICINE

## 2021-06-07 PROCEDURE — 87077 CULTURE AEROBIC IDENTIFY: CPT

## 2021-06-07 PROCEDURE — 99232 SBSQ HOSP IP/OBS MODERATE 35: CPT | Performed by: STUDENT IN AN ORGANIZED HEALTH CARE EDUCATION/TRAINING PROGRAM

## 2021-06-07 PROCEDURE — 700111 HCHG RX REV CODE 636 W/ 250 OVERRIDE (IP): Performed by: STUDENT IN AN ORGANIZED HEALTH CARE EDUCATION/TRAINING PROGRAM

## 2021-06-07 PROCEDURE — A9270 NON-COVERED ITEM OR SERVICE: HCPCS | Performed by: HOSPITALIST

## 2021-06-07 PROCEDURE — A9270 NON-COVERED ITEM OR SERVICE: HCPCS | Performed by: INTERNAL MEDICINE

## 2021-06-07 PROCEDURE — 700102 HCHG RX REV CODE 250 W/ 637 OVERRIDE(OP): Performed by: STUDENT IN AN ORGANIZED HEALTH CARE EDUCATION/TRAINING PROGRAM

## 2021-06-07 PROCEDURE — 700102 HCHG RX REV CODE 250 W/ 637 OVERRIDE(OP): Performed by: HOSPITALIST

## 2021-06-07 PROCEDURE — 87102 FUNGUS ISOLATION CULTURE: CPT

## 2021-06-07 PROCEDURE — 700111 HCHG RX REV CODE 636 W/ 250 OVERRIDE (IP): Performed by: INTERNAL MEDICINE

## 2021-06-07 PROCEDURE — 97535 SELF CARE MNGMENT TRAINING: CPT

## 2021-06-07 PROCEDURE — 80053 COMPREHEN METABOLIC PANEL: CPT

## 2021-06-07 PROCEDURE — 92526 ORAL FUNCTION THERAPY: CPT

## 2021-06-07 PROCEDURE — 770006 HCHG ROOM/CARE - MED/SURG/GYN SEMI*

## 2021-06-07 RX ORDER — CLOTRIMAZOLE 1 %
1 CREAM WITH APPLICATOR VAGINAL DAILY
Status: COMPLETED | OUTPATIENT
Start: 2021-06-07 | End: 2021-06-13

## 2021-06-07 RX ADMIN — NYSTATIN: 100000 POWDER TOPICAL at 17:46

## 2021-06-07 RX ADMIN — METHADONE HYDROCHLORIDE 30 MG: 10 TABLET ORAL at 04:52

## 2021-06-07 RX ADMIN — ENOXAPARIN SODIUM 40 MG: 40 INJECTION SUBCUTANEOUS at 05:00

## 2021-06-07 RX ADMIN — DIPHENHYDRAMINE HYDROCHLORIDE 50 MG: 50 INJECTION INTRAMUSCULAR; INTRAVENOUS at 04:51

## 2021-06-07 RX ADMIN — ACETAMINOPHEN 500 MG: 325 TABLET ORAL at 20:40

## 2021-06-07 RX ADMIN — GABAPENTIN 300 MG: 300 CAPSULE ORAL at 20:40

## 2021-06-07 RX ADMIN — GABAPENTIN 300 MG: 300 CAPSULE ORAL at 04:52

## 2021-06-07 RX ADMIN — DULOXETINE HYDROCHLORIDE 60 MG: 60 CAPSULE, DELAYED RELEASE ORAL at 04:53

## 2021-06-07 RX ADMIN — RISPERIDONE 2 MG: 2 TABLET ORAL at 17:46

## 2021-06-07 RX ADMIN — DIAZEPAM 5 MG: 5 TABLET ORAL at 21:03

## 2021-06-07 RX ADMIN — OXYCODONE 5 MG: 5 TABLET ORAL at 10:14

## 2021-06-07 RX ADMIN — POTASSIUM CHLORIDE 20 MEQ: 1500 TABLET, EXTENDED RELEASE ORAL at 04:52

## 2021-06-07 RX ADMIN — DIVALPROEX SODIUM 500 MG: 125 CAPSULE ORAL at 04:52

## 2021-06-07 RX ADMIN — DIPHENHYDRAMINE HYDROCHLORIDE 50 MG: 50 INJECTION INTRAMUSCULAR; INTRAVENOUS at 23:51

## 2021-06-07 RX ADMIN — RISPERIDONE 2 MG: 2 TABLET ORAL at 07:13

## 2021-06-07 RX ADMIN — DIVALPROEX SODIUM 500 MG: 125 CAPSULE ORAL at 14:25

## 2021-06-07 RX ADMIN — ACETAMINOPHEN 500 MG: 325 TABLET ORAL at 07:44

## 2021-06-07 RX ADMIN — DIPHENHYDRAMINE HYDROCHLORIDE 50 MG: 50 INJECTION INTRAMUSCULAR; INTRAVENOUS at 11:28

## 2021-06-07 RX ADMIN — CEFAZOLIN SODIUM 2 G: 2 INJECTION, SOLUTION INTRAVENOUS at 22:00

## 2021-06-07 RX ADMIN — NYSTATIN: 100000 POWDER TOPICAL at 11:32

## 2021-06-07 RX ADMIN — CLOTRIMAZOLE 1 APPLICATOR: 1 CREAM VAGINAL at 21:00

## 2021-06-07 RX ADMIN — GABAPENTIN 300 MG: 300 CAPSULE ORAL at 14:25

## 2021-06-07 RX ADMIN — DULOXETINE HYDROCHLORIDE 60 MG: 60 CAPSULE, DELAYED RELEASE ORAL at 17:46

## 2021-06-07 RX ADMIN — OXYCODONE 5 MG: 5 TABLET ORAL at 05:07

## 2021-06-07 RX ADMIN — DIVALPROEX SODIUM 500 MG: 125 CAPSULE ORAL at 20:40

## 2021-06-07 RX ADMIN — CEFAZOLIN SODIUM 2 G: 2 INJECTION, SOLUTION INTRAVENOUS at 04:51

## 2021-06-07 RX ADMIN — CEFAZOLIN SODIUM 2 G: 2 INJECTION, SOLUTION INTRAVENOUS at 14:25

## 2021-06-07 RX ADMIN — MAGNESIUM OXIDE TAB 400 MG (241.3 MG ELEMENTAL MG) 400 MG: 400 (241.3 MG) TAB at 04:51

## 2021-06-07 RX ADMIN — OXYCODONE 5 MG: 5 TABLET ORAL at 21:03

## 2021-06-07 RX ADMIN — DIPHENHYDRAMINE HYDROCHLORIDE 50 MG: 50 INJECTION INTRAMUSCULAR; INTRAVENOUS at 17:46

## 2021-06-07 RX ADMIN — ACETAMINOPHEN 500 MG: 325 TABLET ORAL at 14:25

## 2021-06-07 RX ADMIN — OXYCODONE 5 MG: 5 TABLET ORAL at 16:36

## 2021-06-07 RX ADMIN — PAROXETINE HYDROCHLORIDE 10 MG: 10 TABLET, FILM COATED ORAL at 04:53

## 2021-06-07 ASSESSMENT — ENCOUNTER SYMPTOMS
MYALGIAS: 0
CONSTIPATION: 0
NECK PAIN: 0
HALLUCINATIONS: 0
BLURRED VISION: 0
DOUBLE VISION: 0
FEVER: 0
FOCAL WEAKNESS: 0
BACK PAIN: 0
PHOTOPHOBIA: 0
NAUSEA: 0
VOMITING: 0
DIARRHEA: 0
ABDOMINAL PAIN: 0
DIZZINESS: 0
SENSORY CHANGE: 0
WEAKNESS: 1
COUGH: 0
SHORTNESS OF BREATH: 0
CHILLS: 0
SPUTUM PRODUCTION: 1
HEARTBURN: 0

## 2021-06-07 ASSESSMENT — COGNITIVE AND FUNCTIONAL STATUS - GENERAL
SUGGESTED CMS G CODE MODIFIER DAILY ACTIVITY: CK
HELP NEEDED FOR BATHING: A LITTLE
EATING MEALS: A LOT
DRESSING REGULAR UPPER BODY CLOTHING: A LITTLE
DAILY ACTIVITIY SCORE: 18
PERSONAL GROOMING: A LITTLE
TOILETING: A LITTLE

## 2021-06-07 ASSESSMENT — PAIN DESCRIPTION - PAIN TYPE
TYPE: ACUTE PAIN

## 2021-06-07 NOTE — PROGRESS NOTES
Hospital Medicine Daily Progress Note    Date of Service  6/7/2021    Chief Complaint  50 y.o. female admitted 4/16/2021 with recurrent infections    Hospital Course  Per chart review, ICU notes and hospitalists on service interval updates:   This is a 50-year-old female with pseudotumor cerebri s/p  shunt implantation by Dr. Oh neurosurgery, chronic pain syndrome with a history of placement of a nerve stimulator, vasculitis, right anterior chest port for home IV medication administration, recurrent infections related to port, presented on 4/11 to [facility] with recurrent port infection was initially treated with vancomycin and Zosyn, and then changed to ceftaroline subsequently switched to nafcillin, MSSA identified.  [facility] infectious disease is managing antibiotics for the patient.  Dr. Candelaria Surgery removed the port on 4/12.  The patient was further identified to have endocarditis of the tricuspid valve, a lumbar puncture performed on 4/14 showed pleocytosis with negative Gram stain.  The patient suffered worsening leukocytosis and was found to have septic emboli per CT.  Initially, a small pleural catheter was placed.  Significant loculations were encountered.  MSSA empyema was diagnosed.  Neurosurgery was consulted to comment on possibly removal of a  shunt - this was felt not appropriate at this time. The patient remained on mechanical ventilation for 15 days, then a percutaneous trach was placed.  The patient was eventually liberated from mechanical ventilation, the patient did have a right thoracoscopy and decortication of the lung performed on 4/28 by Dr. Ganser.  The patient was of identified to have multiple sources for MSSA including mitral valve, tricuspid valve vegetation, port, spinal stimulator,  shunt possibly.  Patient underwent repeat chest surgery, decortication with 2 chest tube placement on 5/18, had to be placed back on mechanical ventilation after surgery,  central access was replaced on 5/19, the patient treated in ICU with eventual chest tube removal with assistance of thoracic surgery and was deescalated in terms of respiratory support down to trach collar.    Pt was transferred to Hospitalist services and Telemetry on 5/27. Under hospitalist services, cortrek removed (feeding started) and Pt was continued to wean from O2 as tolerated. Pt was continued to be followed by Nataly LEIGH. In need of extensive rehabilitation, plan for LTAC level of care on discharge discussed.    Interval Problem Update  6/7 on medicine unit:  Vitals reviewed; afebrile.  The rest of the vitals within normal parameters. On 2L NC - trach capped  Pain scale reported - 4-9 in lower back  Blood glucose in last 24hrs - under 100s   I/O - Voiding urine; Last BM 6/4    At bedside, lethargic but able to converse - answered questions and followed commands. She expresses her understanding of the plan with a DC to LTACH (LANE).   She reported vaginal order, discharge (?white) and itchiness.     Nataly LEIGH follow up appreciated     Labs reviewed   Na 131      Consultants/Specialty  ID  Thoracic surgery  Pulmonary critical care    Code Status  Full Code    Disposition  Pending placement to LTACH (LANE)   6/7 per CM team, LANE has accepted the patient  Current Barrier: pending insurance authorization    Alternate DC plan at this point has been to SNF.     Discussed with patient, patient's nurse and with multidisciplinary team during rounds including , pharmacist and charge nurse.      Review of Systems  Review of Systems   Constitutional: Positive for malaise/fatigue. Negative for chills and fever.   HENT:        Tracheostomy   Eyes: Negative for blurred vision, double vision and photophobia.   Respiratory: Positive for sputum production. Negative for cough and shortness of breath.    Cardiovascular: Negative for chest pain and leg swelling.   Gastrointestinal: Negative for heartburn, nausea and  vomiting.   Genitourinary: Negative for dysuria, frequency and urgency.   Musculoskeletal: Negative for back pain, myalgias and neck pain.   Skin: Positive for rash (buttocks).   Neurological: Negative for dizziness, sensory change and focal weakness.   Psychiatric/Behavioral: Negative for hallucinations and suicidal ideas.   Poor communication secondary to tracheostomy    Physical Exam  Temp:  [36.3 °C (97.3 °F)-36.5 °C (97.7 °F)] 36.5 °C (97.7 °F)  Pulse:  [85-97] 90  Resp:  [15-20] 18  BP: (92-95)/(53-58) 95/57  SpO2:  [95 %-99 %] 95 %    Physical Exam  Vitals and nursing note reviewed.   Constitutional:       General: She is not in acute distress.     Appearance: She is well-developed. She is ill-appearing. She is not diaphoretic.   HENT:      Head: Normocephalic and atraumatic.   Eyes:      General: No scleral icterus.  Neck:      Vascular: No JVD.   Cardiovascular:      Rate and Rhythm: Regular rhythm. Tachycardia present.   Pulmonary:      Effort: Pulmonary effort is normal. No respiratory distress.      Breath sounds: No stridor. Rales present.      Comments: Trach without inflammation  Abdominal:      General: There is no distension.      Palpations: Abdomen is soft.      Tenderness: There is no abdominal tenderness.   Skin:     Coloration: Skin is pale.      Findings: Rash (pustular rash on buttocks and back improving) present.   Neurological:      Mental Status: She is alert and oriented to person, place, and time.      Motor: Weakness present.   Psychiatric:         Thought Content: Thought content normal.         Fluids    Intake/Output Summary (Last 24 hours) at 6/7/2021 1529  Last data filed at 6/7/2021 1015  Gross per 24 hour   Intake 815 ml   Output --   Net 815 ml       Laboratory  Recent Labs     06/06/21  0400   WBC 4.5*   RBC 3.40*   HEMOGLOBIN 9.5*   HEMATOCRIT 30.8*   MCV 90.6   MCH 27.9   MCHC 30.8*   RDW 52.2*   PLATELETCT 290   MPV 10.1     Recent Labs     06/06/21  0400 06/07/21  7232    SODIUM 134* 131*   POTASSIUM 3.5* 3.8   CHLORIDE 97 97   CO2 29 28   GLUCOSE 66 75   BUN 6* 4*   CREATININE 0.22* <0.17*   CALCIUM 8.9 8.5                   Imaging  DX-CHEST-PORTABLE (1 VIEW)   Final Result      1.  Interval removal of the right-sided chest tubes. No pneumothorax is identified.   2.  Airspace opacities in the right are mildly improved and may represent atelectasis/consolidation.   3.  There is likely a small right pleural effusion.   4.  Patchy opacities on the left are mildly improved.   5.  Interval removal of the enteric tube.      DX-CHEST-PORTABLE (1 VIEW)   Final Result         1. Stable lines and tubes.   2. Stable ill-defined bilateral pulmonary opacities, right worse than left. No large pleural effusions.         DX-CHEST-PORTABLE (1 VIEW)   Final Result      1.  Decrease in volume of right pleural fluid collection/empyema with 2 right chest tubes in place. No pneumothorax identified.      2.  No focal consolidation in the left lung.      DX-CHEST-PORTABLE (1 VIEW)   Final Result      No significant interval change.      DX-CHEST-PORTABLE (1 VIEW)   Final Result         1.  Hazy right pulmonary infiltrates and/or effusion, stable compared to prior study.      DX-CHEST-PORTABLE (1 VIEW)   Final Result         1.  Hazy right pulmonary infiltrates and/or effusion, stable compared to prior study.      DX-CHEST-PORTABLE (1 VIEW)   Final Result      1.  All lines and tubes appear appropriately located      2.  Consolidation throughout the right lung consistent with pneumonia      DX-CHEST-PORTABLE (1 VIEW)   Final Result         1.  Hazy right pulmonary infiltrates and/or effusion, increased compared to prior study.      DX-ABDOMEN FOR TUBE PLACEMENT   Final Result      Feeding tube looped in the stomach.      DX-CHEST-PORTABLE (1 VIEW)   Final Result         1. Stable lines and tubes. No pneumothorax detected.   2. Persistent opacities in the right perihilar region and in the right lung  periphery, similar to prior study. Left lung is essentially clear.      DX-CHEST-PORTABLE (1 VIEW)   Final Result      Postoperative changes of the right chest with placement of an additional chest tube. There is improved aeration in the right lung. No significant pleural effusion or definite pneumothorax.      Right IJ central venous catheter tip projects over the proximal right atrium.      Hypoinflation with interstitial and hazy pulmonary opacities.      DX-CHEST-PORTABLE (1 VIEW)   Final Result         1.  Hazy right pulmonary infiltrates and/or effusion, similar compared to prior study.      DX-ABDOMEN FOR TUBE PLACEMENT   Final Result      Feeding tube tip projects over the expected location the gastric antrum.      DX-CHEST-PORTABLE (1 VIEW)   Final Result         1.  Hazy right pulmonary infiltrates and/or effusion, similar compared to prior study.      DX-CHEST-LIMITED (1 VIEW)   Final Result      1.  No pneumothorax. Only one chest tube is now seen in the right lung.   2.  The chest is otherwise stable.      DX-CHEST-PORTABLE (1 VIEW)   Final Result         1.  Hazy right pulmonary infiltrates and/or effusion, similar compared to prior study.      DX-CHEST-PORTABLE (1 VIEW)   Final Result         1.  Hazy right pulmonary infiltrates and/or effusion, similar compared to prior study.      DX-ABDOMEN FOR TUBE PLACEMENT   Final Result         1.  Nonspecific bowel gas pattern.   2.  Dobbhoff tube tip terminates overlying the expected location of the gastric antrum.   3.  Hazy bilateral lung base infiltrates, greater on the right.      DX-CHEST-PORTABLE (1 VIEW)   Final Result         1.  Hazy right pulmonary infiltrates and/or effusion, similar compared to prior study.      US-RUQ   Final Result         1.  Hepatomegaly   2.  Mild intrahepatic and extrahepatic biliary ductal dilatation, commonly associated with postcholecystectomy physiology, consider causes of biliary obstruction with additional workup as  clinically appropriate      CT-CHEST,ABDOMEN,PELVIS WITH   Final Result      1.  Large empyema in the RIGHT hemithorax, slightly decreased in size from prior exam with chest tubes present.   2.  Consolidation remains.   3.  Multiple cavitary lesions again seen in the LEFT upper lobe, minimally decreased from prior, concerning for septic emboli.   4.  Supportive tubing is unchanged.   5.  Intrahepatic and extrahepatic biliary dilation, likely postoperative although distal stricture, stone or mass remain possibilities.   6.  Moderate pelvic fluid, nonspecific.   7.  No evidence of bowel obstruction or perforation.      DX-CHEST-PORTABLE (1 VIEW)   Final Result         1.  Hazy right pulmonary infiltrates and/or effusion, similar compared to prior study.      DX-CHEST-PORTABLE (1 VIEW)   Final Result         No significant interval change.            DX-CHEST-PORTABLE (1 VIEW)   Final Result         Interval removal of left central venous catheter. Interval adjustment of right PICC with tip in the SVC.         DX-ABDOMEN FOR TUBE PLACEMENT   Final Result      Feeding tube tip at the mid to distal stomach.      IR-PICC LINE PLACEMENT W/ GUIDANCE > AGE 5   Final Result                  Ultrasound-guided PICC placement performed by qualified nursing staff as    above.          DX-CHEST-PORTABLE (1 VIEW)   Final Result      1.  Stable cardiopulmonary findings.   2.  See comments above regarding the right-sided PICC position.      DX-CHEST-PORTABLE (1 VIEW)   Final Result      Stable chest findings compared to 5/5.      DX-CHEST-PORTABLE (1 VIEW)   Final Result      Stable chest findings compared with 5/3.      DX-ABDOMEN FOR TUBE PLACEMENT   Final Result         Feeding tube with tip projecting over the expected area of the stomach.      DX-CHEST-PORTABLE (1 VIEW)   Final Result      Stable chest findings compared with prior.      DX-CHEST-PORTABLE (1 VIEW)   Final Result         1. No significant interval change.       US-EXTREMITY ARTERY LOWER UNILAT RIGHT   Final Result      DX-CHEST-PORTABLE (1 VIEW)   Final Result         1. No significant interval change.      IR-PICC LINE PLACEMENT W/ GUIDANCE > AGE 5   Final Result                  Ultrasound-guided PICC placement performed by qualified nursing staff as    above.          DX-CHEST-PORTABLE (1 VIEW)   Final Result         1.  Pulmonary edema and/or infiltrates, similar to prior study.   2.  Stable opacification right lung, likely effusion. Thoracostomy tubes are in place.   3.  Multiple cavitary appearing left pulmonary lesions, see CT performed April 27, 2021 for further characterization      DX-CHEST-PORTABLE (1 VIEW)   Final Result         1.  Pulmonary edema and/or infiltrates, similar to prior study.   2.  Single opacification right lung, likely effusion. Thoracostomy tubes are in place.   3.  Multiple cavitary appearing left pulmonary lesions, see CT performed April 27, 2021 for further characterization   4.  Soft tissue gas in the right chest wall      DX-CHEST-PORTABLE (1 VIEW)   Final Result         1.  Pulmonary edema and/or infiltrates, similar to prior study.   2.  Increased opacification right lung, likely increased effusion. Thoracostomy tubes are in place.   3.  Multiple cavitary appearing left pulmonary lesions, see CT performed April 27, 2021 for further characterization   4.  Soft tissue gas in the right chest wall      DX-CHEST-PORTABLE (1 VIEW)   Final Result         1.  Pulmonary edema and/or infiltrates, similar to prior study.   2.  Right pneumothorax, stable compared to prior study, right thoracostomy tubes remain in place   3.  Multiple cavitary appearing left pulmonary lesions, see CT performed April 27, 2021 for further characterization   4.  Soft tissue gas in the right chest wall      DX-CHEST-PORTABLE (1 VIEW)   Final Result         1.  Pulmonary edema and/or infiltrates, similar to prior study.   2.  Right pneumothorax, interval decreased  compared to prior study, interval placement of right thoracostomy tubes is noted.   3.  Multiple cavitary appearing left pulmonary lesions, see CT performed yesterday for further characterization   4.  Soft tissue gas in the right chest wall      DX-CHEST-PORTABLE (1 VIEW)   Final Result         1.  Pulmonary edema and/or infiltrates, similar to prior study.   2.  Right pneumothorax, stable compared to prior study, interval removal of right thoracostomy tube is noted.   3.  Multiple cavitary appearing left pulmonary lesions, see CT performed yesterday for further characterization   4.  Soft tissue gas in the right chest wall      DX-CHEST-LIMITED (1 VIEW)   Final Result         No significant interval change.      CT-CTA CHEST PULMONARY ARTERY W/ RECONS   Final Result         No CT evidence of pulmonary embolus.      Previous right chest tube were removed.      Grossly similar in size of the loculated right hydropneumothorax but there is increased layering fluid component. Unchanged mild shift of the mediastinal to the left.      Redemonstration of a pigtail catheter in the medial left lung base. Grossly similar multiple cavitary lesions in the left lung.      US-EXTREMITY VENOUS LOWER BILAT   Final Result      POCT BUTTERFLY-DUPLEX SCAN EXTREMITY VEINS LIMITED   Final Result      DX-CHEST-PORTABLE (1 VIEW)   Final Result         1.  Pulmonary edema and/or infiltrates, similar to prior study.   2.  Right pneumothorax, somewhat decreased to prior study, interval removal of right thoracostomy tube is noted.   3.  Multiple cavitary appearing left pulmonary lesions, see CT performed yesterday for further characterization   4.  Soft tissue gas in the right chest wall      DX-CHEST-PORTABLE (1 VIEW)   Final Result         1.  Pulmonary edema and/or infiltrates, similar to prior study.   2.  Right pneumothorax, similar to prior study with thoracostomy tube in place.   3.  Multiple cavitary appearing left pulmonary lesions,  see CT performed yesterday for further characterization      CT-CHEST (THORAX) W/O   Final Result      Large loculated right hydropneumothorax with interval increase in size of the pneumothorax and pleural fluid.      Right chest tube is in the posterior right thorax.      There is mediastinal shift to the left compatible with tension.      Small pericardial effusion.      Small loculated left pleural effusion with overlying atelectasis/consolidation.   Pigtail catheter is seen at the medial left lung base. Pleural effusion has decreased in size compared to prior.      There are multiple foci of air extending from the right lung to the pleural space suspicious for a bronchopleural fistula.      Multiple cavitary lesions bilaterally with mild interval decrease of the solid component of the cavitary nodules.      Noncavitary 3.1 cm masslike density is seen at the left lung base.      Findings may be related to septic emboli, malignancy or multifocal abscesses. Short interval follow-up recommended.      Soft tissue air on the right is again seen.      Splenomegaly      Findings discussed with Leti Sun.      DX-CHEST-PORTABLE (1 VIEW)   Final Result      No significant change from prior exam.      CT-HEAD W/O   Final Result      1.  RIGHT frontal ventriculostomy catheter unchanged in position.   2.  Mild cortical atrophy.   3.  No intracranial hemorrhage.   4.  Apparent dural thickening overlying the clivus.  Consider further evaluation with contrast-enhanced MRI.      DX-CHEST-LIMITED (1 VIEW)   Final Result      Interval left basilar pigtail catheter with diminished atelectasis, pleural fluid      Stable right hydropneumothorax with thoracostomy tube in place, considerable soft tissue gas and partial lung collapse      IR-CHEST TUBE-EMPYEMA LEFT   Final Result      1.  CT GUIDED LEFT  10 [place] [employer] PIGTAIL CHEST TUBE PLACEMENT FOR POSSIBLE EMPYEMA YIELDING OLD BLOODY FLUID.      2. A CHEST RADIOGRAPH IS FORTHCOMING.       EC-MICAH W/O CONT   Final Result      US-ABDOMEN LTD (SOFT TISSUE)   Final Result      No fluid seen surrounding the electronic implanted spinal stimulator device.      DX-CHEST-LIMITED (1 VIEW)   Final Result      1.  Large right hydropneumothorax with right-sided chest tube in place, likely stable to slightly decreased from prior study.   2.  Small left pleural effusion. Mild improved aeration in the left lung.   3.  Bilateral pulmonary opacities which could be related to combination of atelectasis, edema and/or infection..      CT-CTA HEAD WITH & W/O-POST PROCESS   Final Result      1.  Patent carotid and vertebral arteries.      2.  Again seen right frontal the jugular peritoneal shunt catheter. There is mild increase in volume of the lateral and third ventricles.      CT-CHEST (THORAX) W/O   Final Result      1.  Interval placement of a right-sided chest tube into a large loculated right-sided pleural effusion. There is now seen a large right-sided hydropneumothorax in the area that previously seen fluid. The chest tube extends into that hydropneumothorax.    There is some residual pleural fluid seen as well. A hydropneumothorax is somewhat loculated with components most prominently inferiorly and medially.      2.  New loculated left pleural effusion.      3.  Again seen multiple bilateral cavitary pulmonary masses which are more prominent than previously seen with increasing cavitation and measure up to 3 cm size. Differential diagnosis includes septic emboli, multifocal abscesses and neoplasm.      4.  Large amount of subcutaneous emphysema on the right.      5.  Splenomegaly.      6.  Multiple support devices.      Fleischner Society pulmonary nodule recommendations:         DX-CHEST-LIMITED (1 VIEW)   Final Result      1.  Support devices as described above.      2.  Right chest tube present with a again seen right-sided pneumothorax, possibly increased in size and loculated.      3.  Worsening  bilateral pulmonary infiltrates.      DX-ABDOMEN FOR TUBE PLACEMENT   Final Result      Cortrak feeding tube tip projects in the region of the duodenal bulb.      DX-CHEST-LIMITED (1 VIEW)   Final Result      Loculated right pneumothorax is decreased in size compared to prior.      Small left pleural effusion.      Small loculated right pleural effusion.      Extensive bilateral airspace opacities are not significantly changed.      Soft tissue air on the right is again noted.      DX-CHEST-LIMITED (1 VIEW)   Final Result      Decreased partially loculated right pleural fluid with increased pleural air. Right chest tube is in place.      Increased hazy opacity in the left lung possibly atelectasis.         US-ABDOMEN LTD (SOFT TISSUE)   Final Result      No drainable fluid collection.      DX-CHEST-PORTABLE (1 VIEW)   Final Result      Decreased partially loculated right pleural fluid with increased pleural air. Right chest tube is in place.      No other significant change.      DX-CHEST-PORTABLE (1 VIEW)   Final Result         1.  Pulmonary edema and/or infiltrates are identified, which are somewhat decreased since the prior exam.   2.  Moderate loculated appearing right pleural effusion, slightly decreased since prior      DX-ABDOMEN FOR TUBE PLACEMENT   Final Result      Feeding tube tip at the distal stomach.      DX-CHEST-PORTABLE (1 VIEW)   Final Result         1.  New chest tube is noted in the right lower hemithorax.      2.  Right pleural effusion is again identified which appears similar to the prior exam.      3.  No new infiltrates or consolidations are identified.      DX-CHEST-PORTABLE (1 VIEW)   Final Result         1.  Pulmonary edema and/or infiltrates are identified, which are stable since the prior exam.   2.  Moderate loculated appearing right pleural effusion      DX-CHEST-PORTABLE (1 VIEW)   Final Result         No significant interval change.      POCT BUTTERFLY-ECHOCARDIOGRAM LTD W/O CONT     (Results Pending)        Assessment/Plan  * Empyema of right pleural space (HCC)- (present on admission)  Assessment & Plan  Patient s/p multiple chest tubes, thoracotomy and decortication  Now s/p second decortication 5/17  Per thoracic surgery note, sutures to be removed on 6/9      Vaginal itching  Assessment & Plan  Odor and ?discharge  Clotrimazole topical   Culture requested    Debility  Assessment & Plan  Acute on chronic, the patient likely need of LTAC treatment post acute treatment    Elevated alkaline phosphatase level- (present on admission)  Assessment & Plan  The patient not able to tolerate a MRCP at this time, ultrasound grossly unremarkable, observe    Agitation requiring sedation protocol- (present on admission)  Assessment & Plan  Monitor,    Goals of care, counseling/discussion  Assessment & Plan  Patient with overall high risk of morbidity and mortality given her gravely ill state and prior chronic medical conditions  Palliative care following    Spinal cord stimulator status  Assessment & Plan  Patient's stimulator is Nuve[employer]ra. It is FDA approved as MRI compatible, but the company has gone out of business, so there is no support team to assist with MRI.  Thus, if MRI were performed, our radiologists would need to protocol it correctly to manage the stimulator. The former rep from [employer] is Andre, [phone].  Information obtained from Dr. Mahoney's office, [phone].     Per  (5/4/2021), it is not MRI compatible unfortunately.     Thrombocytosis (HCC)  Assessment & Plan  Acute reaction since resolved    Anasarca- (present on admission)  Assessment & Plan  Improved nutritional status, skin protective measures    Trapped lung  Assessment & Plan  Now s/p decortication   Chest tubes removed on 5/26, observe    Pulmonary abscess (HCC)  Assessment & Plan  From septic emboli  Now s/p decortication    Fever  Assessment & Plan  Resolved, monitor, antibiotic therapy    Atelectasis  Assessment  & Plan  Pulmonary recruiting, respiratory care    Pneumonia  Assessment & Plan  MSSA  Ancef per ID    History of vasculitis- (present on admission)  Assessment & Plan  Does not appear to be a current issue    Bacteremia due to methicillin susceptible Staphylococcus aureus (MSSA)- (present on admission)  Assessment & Plan  Infectious disease assisting with antibiotic treatment  Multiple potential sources  Endocarditis  ID following  Continue Ancef until 6/14    CSF pleocytosis- (present on admission)  Assessment & Plan  Infectious disease managing    Acute bacterial endocarditis- (present on admission)  Assessment & Plan  Mitral valve, tricuspid valve  MSSA  Ongoing antibiotic therapy for acute infection and 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    Acute blood loss anemia  Assessment & Plan  Likely secondary to hemothorax  300 cc of blood removed after chest tube placed at [facility]  Hb has been stable around 9-10    Prolonged Q-T interval on ECG- (present on admission)  Assessment & Plan  Resolved after correcting metabolic issues  Cont to follow   Monitor electrolytes, acidosis etc    Acute respiratory failure with hypoxia (HCC)- (present on admission)  Assessment & Plan  Secondary to MSSA bacteremia, empyema, pulmonary septic emboli  S/p tracheostomy  Ongoing respiratory care, pulmonary toilet, antibiotic treatment  Prolonged ventilator dependence  Will need LTAC  CXR showed improvement  Wean oxygen as tolerated  CPT.    History of complicated migraines- (present on admission)  Assessment & Plan  Monitor    GERD (gastroesophageal reflux disease)- (present on admission)  Assessment & Plan  History of, as needed treatment    Hyponatremia  Assessment & Plan  Mild - asymptomatic  Monitor    Tachycardia  Assessment & Plan  Persistent, likely secondary with multiple underlying ongoing conditions  Monitor  Stable    Pseudotumor cerebri  Assessment & Plan  History of  shunt, neurosurgery opted against  removal    H/O: CVA (cerebrovascular accident)- (present on admission)  Assessment & Plan  Monitor    Chronic pain syndrome- (present on admission)  Assessment & Plan  Pre-existing, pain management    Port or reservoir infection  Assessment & Plan  S/p removal    Thrombocytopenia (HCC)- (present on admission)  Assessment & Plan  Transient, resolved    Anemia- (present on admission)  Assessment & Plan  Monitor, transfuse as indicated    Septic shock (HCC)- (present on admission)  Assessment & Plan  Multiple sources, recovered with aggressive treatment, IV antibiotics long-term  Resolved    Sepsis (HCC)  Assessment & Plan  Multiple sources possible, recovered with medical treatment  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 Oh felt there is currently no need to remove    Folliculitis  Assessment & Plan  Pustular rash of back and buttocks  Presenting on dependent areas of skin  Moisture related  Recommend out of bed  Wound care for moisture mitigation  On IV ancef and do not think that topical Abx would be helpful    Improved    Acute encephalopathy- (present on admission)  Assessment & Plan  Per neurosurgery no need to remove  shunt  Improved           VTE prophylaxis: enoxaparin    Case and plan of care discussed with nurse staff during multidisciplinary rounds.    Patient is has a high medical complexity and is at high risk for complication, morbidity, and mortality.

## 2021-06-07 NOTE — THERAPY
Speech Language Pathology  Daily Treatment     Patient Name: Enedelia Pang  Age:  50 y.o., Sex:  female  Medical Record #: [id number]  Today's Date: 6/7/2021     Precautions: Fall Risk, Swallow Precautions ( See Comments), Tracheostomy   Comments: Trache is currently capped!    Assessment      Received order for swallowing reassessment for possible diet upgrade.  Pt seen this afternoon with mom present.  Trache has been capped for ~24 hours, and patient is tolerating trache capping with 2L O2 via nasal cannula.  Voice was noted to be fairly strong and clear.  Presentation of PO consisted of mildly thick liquids, minced and moist and soft and bite sized consistencies.  She was able to feed herself but needed min cues to take smaller bites and sips and to slow down.  She was instructed to use a double swallow to clear pharyngeal residue as she did have slight wet voice following larger bite of minced and moist texture.  Once double swallow implemented, she was able to tolerate the minced and moist foods and single sips of the MTL well without any other overt S/Sx of aspiration noted.  On the soft and bite sized consistencies, she had wet voice x2, throat clearing x1 which can be concerning for possible pharyngeal residue or penetration/aspirarion.  She did have increased WOB by the end of the session.  Overall, she continues to make progress; however, given her prolonged hospital stay and history of dysphagia during this stay, would like to proceed cautiously.  Will initiate a minced and moist diet with mildly thick liquids (MM5/MT2) with 1:1 feeding. Float pills whole in puree or crush larger ones if need be. Speaking valve OR Trache Cap must be on for all PO intake.  Anticipate that with decannulation, swallow will continue to improve.  Depending on progress, may need to repeat diagnostic study, but will wait until decannulation to determine.  SLP will follow.  Swallow precautions posted at [facility] and education to  patient and her mom provided.          Plan     1) Minced and moist/mildly thick liquids (MM5/MT2) with 1:1 supervision  2) Float/crush pills in puree depending on size and patient preference.   3) Small bites and sips:  Double swallow for each bite and sip  4) NO straws  5) Speaking valve OR Trache Cap must be on for all PO intake.     Continue current treatment plan.    Discharge Recommendations: Recommend post-acute placement for additional speech therapy services prior to discharge home     Objective       06/07/21 1555   Pain 0 - 10 Group   Therapist Pain Assessment 0;Post Activity Pain Same as Prior to Activity;Nurse Notified   Voice   Comments trache currently capped, and patient with fairly strong voice   Dysphagia    Positioning / Behavior Modification Self Monitoring;Modulate Rate or Bite Size;Multiple Swallows   Other Treatments PO trials of MM5/SB6/MT2 via cup   Diet / Liquid Recommendation Mildly Thick (2) - (Nectar Thick);Minced & Moist (5) - (Dysphagia II)   Nutritional Liquid Intake Rating Scale Thickened beverages (mildly thick unless otherwise specified)   Nutritional Food Intake Rating Scale Total oral diet with multiple consistencies but requiring special preparation or compensations   Nursing Communication Swallow Precaution Sign Posted at Head of Bed   Skilled Intervention Compensatory Strategies;Verbal Cueing   Comments Mom present during session and education provided to both patient and mom   Recommended Route of Medication Administration   Medication Administration  Crush all Medications in Puree;Float Whole with Puree  (depending on size of pill)   Short Term Goals   Short Term Goal # 1 The patient will tolerate the speaking valve while awake with no s/sx of emotional or respiratory distress   Goal Outcome # 1 Goal met, new goal added   Short Term Goal # 1 B  Pt will tolerate trache capping with no overt S/Sx of respiratory distress noted   Goal Outcome  # 1 B Progressing as expected    Short Term Goal # 3 New 5/31: Pt will consume pureed solids/mildly thick liquids with 1:1 supervision/feeding and no s/sx of aspiration.   Goal Outcome  # 3 Goal met, new goal added   Short Term Goal # 3 B  New 6/7: Pt will consume MM5/mildly thick liquids with 1:1 supervision/feeding and min cues to use swallow strategies with no S/Sx of aspiration or respiratory distress   Goal Outcome  # 3 B Progressing as expected   Anticipated Discharge Needs   Discharge Recommendations Recommend post-acute placement for additional speech therapy services prior to discharge home   Therapy Recommendations Upon DC Dysphagia Training;Community Re-Integration;Patient / Family / Caregiver Education

## 2021-06-07 NOTE — CARE PLAN
Problem: Pain Management  Goal: Pain level will decrease to patient's comfort goal  Outcome: Progressing     Problem: Fall Risk  Goal: Patient will remain free from falls  Outcome: Progressing     The patient is Stable - Low risk of patient condition declining or worsening    Shift Goals  Clinical Goals: pain management  Patient Goals: sleep comfortably  Family Goals: NA    Progress made toward(s) clinical / shift goals:  Fall precautions are in place.    Patient is not progressing towards the following goals:

## 2021-06-07 NOTE — CARE PLAN
Problem: Nutritional:  Goal: Achieve adequate nutritional intake  Description: Patient will consume 50% of meals  Outcome: Not Progressing     Per ADL's, PO average < 50%. Pt reports poor PO d/t dislike for pureed foods (SLP last assessed 6/4). Per CNA at bedside, pt drank Boost Plus this AM and ate yogurt. Will add yogurt at breakfast and [employer]ani mixed-berry yogurt smoothie at lunch per pt preference.  Pt preparing for lunch at time of visit.     RD continues to follow.

## 2021-06-07 NOTE — PROGRESS NOTES
Infectious Disease Progress Note    Author: Brian Omer M.D. Date & Time created: 6/7/2021  1:42 PM     Cefazolin 5/18-5/19  Target 6/14/21     Thoracoscopy & Decortication - 4/28  Decortication 5/17     PRIOR Rx:   Zosyn 4/22-4/23  Previous: Unasyn, Zosyn, Vanco, Daptomycin  Ceftaroline: start 4/13-4/15  Nafcillin 2g Q4 hrs 4/15-4/23      Meropenem 5/19-5/26   Cefepime 4/23 5/18 4/14: Unable to give name of previous NSG or neurologist. Seems confused, but able to say some sentences fluently.   4/15: Line cultures now growing MSSA. As well as from the blood. Repeat blood culture 4/13+ in both sets. Patient has worsening confusion. CSF fluid analyses suggest pleocytosis. Cultures are no growth from the CSF. Chest CT was ordered this morning indicating a loculated right pleural effusion as well as bilateral septic emboli. Dr. Mack spoke with Dr. Oh yesterday and no surgical intervention unless clinical deterioration.  4/16: Increased respiratory distress.  Tachypneic.  CT with loculated collection.  Dr Resendiz not available.  No thoracic surgeon available.  Plans to have pulmonary place CT.  Transferring to ICU.  4/17: Transferred to [facility] last night. Hgb dropped to 6.4 requiring PRBC transfusion. Requiring 2 pressors. Fio2 50%. Febrile 38.8. Pending OR decortication of empyema and hemothorax. Chest tube placed at [facility] shows 8,371 WBC, ,000, 74% N  4/18: Chest tube was upsized by surgery yesterday, no decortication. WBC 22k. Fio2 40%. Vasopressin. Levophed down to 2mcg. Afebrile 24 hrs. Last fever 38.6 on 4/16.   4/19: Still on vent. Levo 3 mcg, vasopressin. Afebrile 72 hours. WBC 21k. BC 4/17 NGTD x 48 hours. Unable to do MRI brain given neurostimulator per RN.   4/20: Remaining afebrile. Hb 6.2 and undergoing transfusion today.WBC 24,100, 5% bands.1700 ml CT output. Copious bloody ET secretions. No pressors. Receiving dornase and TPA per CT. Right pleural effusion not large per CXR  today.   4/21: WBC 31k. Bronchoscopy yesterday showing blood. Cultures sent. TPA on hold per RN due to arvind blood from chest tube requiring PRBC transfusion for hgb 6. Pending chest CT today. Per , spinal stimulator is non-MRI compatible. Levophed 10mcg. 30% Fio2. Afebrile.   4/22: Fever 101.3 this am. WBC 20,300 down from 32,200 yesterday. BAL growing Klebsiella pneumoniae but susceptibility panel not set up until today. CTA of brain shows no lesion. CT of chest shows enlarging cavitary lung lesions bilat and large loculated new left pleural effusion and large hydropneumothorax on right. TPA & dornase infusions of right chest ended 4/20 after considerable bleeding requiring transfusion. Hb now down again to 6.8.  4/23: WBC 23k. Fio2 40%. Tmax 38.1. US of stiumlator shows small fluid collection but no drainable abscess. Pending MICAH today. Pending L chest tube placement  4/24: Continue fever 100.8-101.8. WBC 16,600. MICAH shows large vegetations on both tricuspid valve and mitral valve with mild TR & MR.  No aortic root abscess. Left chest tube placed yesterday yielding 8 ml of old bloody fluid. Bronch today revealed copious thick maroon secretions in distal trachea and both mainstem bronchi. On the right these were obstructive. Remaining off pressors. Last positive blood cultures (MSSA) were 4/16, negative 4/17.  4/25: Fever 100.6 this AM. wBC 13,300. Hb 6.6. CT: right hydropneumothorax increasing, right bronchopleural fistula, mediastinal shift ot the left , multiple cavitary pulmonary nodules, 3.1 cm left basilar mass, splenomegaly. CT of head shows dural thickening over the clivus. Lac+ GNR >100,000 col/ml growing from BAL of 4/24, presumed Klebsiella. Left peural fluid culture negative from 4/23.  4/26: Fever 100.4 last night. WBC 13,500. Hb 7.4. Plts 502,000. ESR >120. UA fairly clear except 30 mg% protein, 2-5 wbc and rare rbc. CXR unchanged except more prominent pulmonary edema. GNR in BAL may be a  different species of Klebsiella, and resistant to ampicillin & tmp-sulfa; confirmation pending.  4/28: Fever 100.8 last night. WBC 27,700. Hb 6.6. Plts 482,000. Creat 0.19. Kleb pneum in BAL on 4/24 is resistant to ampicillin & tmp-sulfa but otherwise sensitive. O2 sat 96% on FIO2 30% now, PEEP 8. Has been re-evaluated by thoracic surgeon, Dr. Ganser, who believes she will not wean without decortication on the right. Surgery anticipated today.  4/29: S/P right thoracoscopy with decortication with cultures NG at 24 hrs.  4/30: Had a bronch due to hypoxia and hypotension. CXR with worsening right hemithorax pulmonary opacities. Bloody mucous plugs removed. Pressors started. Febrile this am. Tachy in 130s. Pressors just removed. Tolerating vent, but not a SBT candidate at the moment.  5/1: Small air leak CT-2 every ~ 2/3 breathes. The K. pneumoniae from her thoracoscopy is sensitive to her cefepime so no antibiotic change needed.   5/2: No air leak today she tolerated 2  hrs of spontaneous breathing with support today.      5/3: Second day with SBT and doing well now for 2 hrs. Slight bump in temperature and      WBC's but no obvious clinical infectious changed so watch closely.      5/4: Fever still from time to time. WBCs trending up. Chest tubes in place. Trach.       5/5: She clearly is better today she was sitting up in bed with open eyes and follows commands. She still has low grade fever but her WBC's are dropping. Cefepime dose increased yesterday for increased CNS coverage if necessary. She will probably need further thoracic surgery as mentioned by Dr. Ganser. The issue of what to do with her stimulator is still up in the air for now as it probably will need to be removed but she is nort a candidate for more major surgery at this time.       5/7: She continues to improve and under go longer SBT trials. She just had a new PICC placed in her right arm and plan is to D/C central line.        5/8: PICC placed. No  fevers. Comfortable. Cortrak placed. Failing SBTs.  5/9: No acute issues. No fevers. Comfortable. Family at bedside.   5/10: No acute issues, fevers or WBC bumps. No changes in condition. She is to get her CT scan today and be reviewed by surgery  5/11:  at bedside.  Has been referred to LANE.  Repeat CT scan completed.  Results noted.  ? If Dr Ganser has seen.  Still with an empyema:  ? If candidate for further surgery.  5/12: Pending next thoracic surgery. No fever. Anxious.  5/13: ? Surgery date.  No one seems to know.  Had speaking valve in and having swallow eval with speech therapy  5/14: No acute issues. No fevers. Surgery Monday.  5/15: One CT accidentally pulled out yesterday.  Has been on T piece and tolerating. Plans for surgery Monday afternoon.  5/16: On schedule for surgery 5/17: 4:30 pm.  Moved to room T614.  No new issues.  5/17: Was sitting up in the bedside chair since change of shift.  Now walking in hallways with RN and CNA.  5/18: Decortication yesterday with 2 chest tube placement. WBC improving 21->14k  5/19: Hypotension and tachycardia.  Decreased H/H.  Placed back on vent to rest.  Dr Lozano in process of placing new line.  Antibiotics were deesculated yesterday to Cefazolin.  No cx were taken at surgery.  5/20: Pt was febrile yest afternoon 38.2, but now afebrile overnight after meropenem. WBC improved 11k->8k. Fio2 30%. Phenylephrine now off this morning. CXR shows new R consolidations.   5/21: Off pressors.  On vent: FiO2 30%, PEEP 8, PS 5 Received pRBCs yesterday.  5/22: Remains afebrile, off pressors. FiO2 30%.   5/23: Afebrile. FiO2 30%. Bronchoscopy yesterday normal.   5/24: No new cultures obtained at last bronchoscopy.  On  T piece this am: FiO2 30%.  5/25: Remaining afebrile. WBC 8600.   5/26: Chest tube removed today. Possible transfer to [facility].  5/27: O2 sat usuallyl 95% but intermittent desat to 87-89%.  Remaining afebrile.WBC 6100. Alk phos 641 but normal transaminases.    5/28: Pending [facility] transfer. NO new issues overnight  5/29: Awaiting bed at [facility]. Remaining afebrile. WBC 5800, creat 0.19.  5/31. Remaining afebrile. WBC 6000. . Remains weak and frail. Alk Phos 694 with normal transaminases. Slightly improved bilateral patchy infiltrates.   6/1: She is anxious to move on but still waiting for insurance approval to [facility].  6/2: Still awaiting bed at [facility]. Remaining afebrile. WBC 8300. Alk Phos 705 but transaminases normal and bilirubine 0.4.  6/4: Awaiting insurance auth before bed can be assigned at [facility]. Remaining afebrile. Extensive folliculitis over back and buttocks.  WBC 8300 on 6/2. Last  on 5/31.  6/5: Her ESR is down to 53 from 102 a good sign hopefully. She continues to work on her strength by walking.  6/6: Speech evaluation to check her swallow as she is stronger and can't stand pureed.  6/7: [facility] was denied by insurance. Could she go to full rehab now? Swallow eval pending.clotrimazole for vaginal drainage and itch.    Interval History:  Past 24 hr reviewed with RN.     Labs Reviewed, Medications Reviewed, Radiology Reviewed, Wound Reviewed, Fluids Reviewed and GI Nutrition Reviewed    Review of Systems:  Review of Systems   Constitutional: Negative for chills and fever.   Respiratory: Negative for cough and shortness of breath.    Cardiovascular: Negative for chest pain.   Gastrointestinal: Negative for abdominal pain, constipation, diarrhea, nausea and vomiting.   Genitourinary:        Vaginal drianage.   Skin: Itching: posteror buttock.   Neurological: Positive for weakness.       Physical Exam:  Physical Exam  Vitals and nursing note reviewed.   Constitutional:       General: She is not in acute distress.     Appearance: Normal appearance. She is not ill-appearing, toxic-appearing or diaphoretic.   HENT:      Head: Normocephalic and atraumatic.   Cardiovascular:      Rate and Rhythm: Normal rate and regular rhythm.      Heart sounds: No murmur heard.      Pulmonary:      Effort: Pulmonary effort is normal. No respiratory distress.      Breath sounds: Normal breath sounds. No wheezing or rhonchi.   Abdominal:      General: Abdomen is flat. There is no distension.      Palpations: Abdomen is soft.      Tenderness: There is no abdominal tenderness. There is no guarding.   Skin:     General: Skin is warm and dry.   Neurological:      Mental Status: She is oriented to person, place, and time.   Psychiatric:      Comments: Flat  But smiling.         Labs:  Recent Results (from the past 24 hour(s))   Comp Metabolic Panel (CMP) - Every Monday    Collection Time: 06/07/21  4:15 AM   Result Value Ref Range    Sodium 131 (L) 135 - 145 mmol/L    Potassium 3.8 3.6 - 5.5 mmol/L    Chloride 97 96 - 112 mmol/L    Co2 28 20 - 33 mmol/L    Anion Gap 6.0 (L) 7.0 - 16.0    Glucose 75 65 - 99 mg/dL    Bun 4 (L) 8 - 22 mg/dL    Creatinine <0.17 (L) 0.50 - 1.40 mg/dL    Calcium 8.5 8.5 - 10.5 mg/dL    AST(SGOT) 10 (L) 12 - 45 U/L    ALT(SGPT) <5 2 - 50 U/L    Alkaline Phosphatase 414 (H) 30 - 99 U/L    Total Bilirubin 0.3 0.1 - 1.5 mg/dL    Albumin 2.7 (L) 3.2 - 4.9 g/dL    Total Protein 6.8 6.0 - 8.2 g/dL    Globulin 4.1 (H) 1.9 - 3.5 g/dL    A-G Ratio 0.7 g/dL   ESTIMATED GFR    Collection Time: 06/07/21  4:15 AM   Result Value Ref Range    GFR If African American >60 >60 mL/min/1.73 m 2    GFR If Non African American >60 >60 mL/min/1.73 m 2   WET PREP    Collection Time: 06/07/21  1:16 PM    Specimen: Genital   Result Value Ref Range    Significant Indicator NEG     Source GEN     Site -     Wet Prep For Parasites -      Results     Procedure Component Value Units Date/Time    FUNGAL CULTURE [[id number]] Collected: 06/07/21 1248    Order Status: Completed Specimen: Respirate from Vaginal Updated: 06/07/21 1316    Narrative:      Collected By:[id number] STUART MELVIN    WET PREP [[id number]] Resulted: 06/07/21 1316    Order Status: Completed Specimen: Genital Updated: 06/07/21 1316      Significant Indicator NEG     Source GEN     Site -     Wet Prep For Parasites -    WET PREP [[id number]] Collected: 21 1248    Order Status: Canceled Specimen: Vaginal         Hemodynamics:  Temp (24hrs), Av.4 °C (97.5 °F), Min:36.3 °C (97.3 °F), Max:36.5 °C (97.7 °F)  Temperature: 36.5 °C (97.7 °F)  Pulse  Av.1  Min: 40  Max: 220   Blood Pressure: (!) 95/57 (nurse aware)    PICC Double Lumen 21 Lumen 1: Purple Lumen 2: Red Right Brachial (Active)   Site Assessment Clean;Dry;Intact 21   Lumen 1 Status Flushed;Scrubbed the hub prior to access;Normal saline locked;Blood return noted 21   Lumen 2 Status Flushed;Scrubbed the hub prior to access;Normal saline locked;Blood return noted 21   Line Secured at (cm) 1 cm 21   Extremity Circumference (cm) 30.5 cm 21   Dressing Type Biopatch;Occlusive;Securing device;Transparent 21   Dressing Status Clean;Dry;Intact 21 09   Dressing Intervention N/A 21   Dressing Change Due 06/12/21 06/06/21 2020   Date Primary Tubing Changed 06/05/21 06/05/21 2000   Date Secondary Tubing Changed 06/05/21 06/05/21 2000   Date IV Connector(s) Changed 21   NEXT Primary Tubing Change  21   NEXT Secondary Tubing Change  21   NEXT IV Connector(s) Change 21   Line Necessity Assessed Antibiotic Therapy Greater than 7 Days 21   $ Double Lumen PICC Charge Single kit used 21 1118     Wound:  @WOUNDLDA(4)@     Fluids:  Intake/Output                             21 - 2159 21 07 - 2159 21 - 06/08/21 659 Total 1900-0659 Total 1838-6031 5482-8615 Total                    Intake    P.O.  --  60 60  120  -- 120  815  -- 815    P.O. -- 60 60 120 -- 120 815 -- 815    Total Intake -- 60 60 120 -- 120 815 -- 815       Output     Stool  --  -- --  --  -- --  --  -- --    Number of Times Stooled -- 0 x 0 x -- -- -- -- -- --    Total Output -- -- -- -- -- -- -- -- --       Net I/O     -- 60 60 120 -- 120 815 -- 815           GI/Nutrition:  Orders Placed This Encounter   Procedures   • Diet Order Diet: Level 4 - Pureed (speaking valve on for all PO intake); Liquid level: Level 2 - Mildly Thick; Tray Modifications (optional): No Straws, SLP - 1:1 Supervision by Nursing, SLP - Deliver to Nursing Station     Standing Status:   Standing     Number of Occurrences:   1     Order Specific Question:   Diet:     Answer:   Level 4 - Pureed [25]     Comments:   speaking valve on for all PO intake     Order Specific Question:   Liquid level     Answer:   Level 2 - Mildly Thick     Order Specific Question:   Tray Modifications (optional)     Answer:   No Straws     Order Specific Question:   Tray Modifications (optional)     Answer:   SLP - 1:1 Supervision by Nursing     Order Specific Question:   Tray Modifications (optional)     Answer:   SLP - Deliver to Nursing Station     Medications:  Current Facility-Administered Medications   Medication Last Admin   • nystatin (MYCOSTATIN) powder Given at 06/07/21 1132   • diphenhydrAMINE (BENADRYL) injection 50 mg 50 mg at 06/07/21 1128   • acetaminophen (TYLENOL) tablet 500 mg 500 mg at 06/07/21 0744   • divalproex (DEPAKOTE SPRINKLE) capsule 500 mg 500 mg at 06/07/21 0452   • DULoxetine (CYMBALTA) capsule 60 mg 60 mg at 06/07/21 0453   • gabapentin (NEURONTIN) capsule 300 mg 300 mg at 06/07/21 0452   • methadone (DOLOPHINE) tablet 30 mg 30 mg at 06/07/21 0452   • PARoxetine (PAXIL) tablet 10 mg 10 mg at 06/07/21 0453   • risperiDONE (RISPERDAL) tablet 2 mg 2 mg at 06/07/21 0713   • senna-docusate (PERICOLACE or SENOKOT S) 8.6-50 MG per tablet 2 tablet 2 tablet at 06/06/21 0622    And   • polyethylene glycol/lytes (MIRALAX) PACKET 1 Packet      And   • magnesium hydroxide (MILK OF MAGNESIA) suspension 30 mL       And   • bisacodyl (DULCOLAX) suppository 10 mg     • acetaminophen (Tylenol) tablet 650 mg 650 mg at 05/30/21 0457   • diazePAM (VALIUM) tablet 5 mg 5 mg at 06/03/21 2134   • oxyCODONE immediate-release (ROXICODONE) tablet 5 mg 5 mg at 06/07/21 1014   • ceFAZolin in dextrose (ANCEF) IVPB premix 2 g Stopped at 06/07/21 0521   • enoxaparin (LOVENOX) inj 40 mg 40 mg at 06/07/21 0500   • Respiratory Therapy Consult     • ondansetron (ZOFRAN) syringe/vial injection 4 mg 4 mg at 06/02/21 0703   • labetalol (NORMODYNE/TRANDATE) injection 10 mg 10 mg at 05/04/21 0226   • ipratropium-albuterol (DUONEB) nebulizer solution       Medical Decision Making, by Problem:  Active Hospital Problems    Diagnosis    • *Empyema of right pleural space (HCC) [J86.9]    • Shock (HCC) [R57.9]    • Debility [R53.81]    • Elevated alkaline phosphatase level [R74.8]    • Agitation requiring sedation protocol [R45.1]    • Spinal cord stimulator status [Z96.89]    • Goals of care, counseling/discussion [Z71.89]    • Fever [R50.9]    • Pulmonary abscess (HCC) [J85.2]    • 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]    • Pleural effusion, left [J90]    • Anasarca [R60.1]    • Thrombocytosis (HCC) [D47.3]    • Atelectasis [J98.11]    • Acute respiratory failure with hypoxia (HCC) [J96.01]    • Prolonged Q-T interval on ECG [R94.31]    • Acute bacterial endocarditis [I33.0]    • CSF pleocytosis [D72.9]    • Bacteremia due to methicillin susceptible Staphylococcus aureus (MSSA) [R78.81, B95.61]    • History of vasculitis [Z86.79]    • Pneumonia [J18.9]    • History of complicated migraines [G43.109]    • GERD (gastroesophageal reflux disease) [K21.9]    • Hyponatremia [E87.1]    • Tachycardia [R00.0]    • Pseudotumor cerebri [G93.2]    • H/O: CVA (cerebrovascular accident) [Z86.73]    • Chronic pain syndrome [G89.4]    • Port or reservoir infection [T80.212A]    • Thrombocytopenia (HCC) [D69.6]    • Septic shock (HCC) [A41.9, R65.21]    • Anemia [D64.9]    • Sepsis (HCC)  [A41.9]    • Hypokalemia [E87.6]    • Hypomagnesemia [E83.42]    • S/P  shunt [Z98.2]    • Anxiety [F41.9]    • Folliculitis [L73.9]    • Acute encephalopathy [G93.40]      Impression   -Severe sepsis  -Catheter related bloodstream infection due to infected port status post removal 4/12-staph aureus  -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       - Acute left empyema s/p chest tube placement 4/23, decortication 5/17, chest tube removal 5/26 - Klebsiella  -Multiple acute septic pulmonary emboli bilaterally  -Acute bilateral pneumonia due to above     --Pulmonary edema  -Pseudotumor cerebri with underlying  shunt: possible arachnoiditis  -Chronic migraine headaches  -History of autoimmune vasculitis  -Elevated alkaline phosphatase & bilirubin  -Acute encephalopathy due to sepsis      - anemia due to above          - acute fever on 5/19 likely from secondary VAP          - Secondary VAP R sided pneumonia          - decubitus ulcer sacral.      Plan   Tracheal aspirate recently 5/19 no growth, blood cultures 5/19 no growth. CXR shows worsening R sided infiltrates concerning for secondary infections after reviewing her CXR's between 5/18 and      5/19.  Repeated sputum cultures still reflecting her original Klebsiella empyema infection. She improved post operatively with defervescence and pressor weaning while on meropenem. Suggestive of either time, or an anaerobic infection that we weren't covering. Continued meropenem for 7 to 8 days, then switched back to cefazolin on 5/26 to complete therapy for endocarditis.  - Clearly needs aggressive physical therapy.  - Continue cefazolin 4 week target date after decortication 6/14/21  - Received Meropenem for VAP for 7 days finished 5/26  - Will need repeat Chest CT right before completion of antibiotics  - After completion of IV abx  Will need  suppression therapy afterward for her HW (spinal stimulator,  shunt  targeting MSSA with Keflex, for example).    - Chest tube removed 5/26     LANE was denied by insurance. Could she go to full rehab now? Swallow eval pending.clotrimazole for vaginal drainage and itch. Continue with antibiotics IV as planned 7 more days then to PO chronic suppression.  Speech evaluation to check her swallow and full rehab consult.  Case and treatment reviewed with patient, mj, RN and Dr. Ackerman 35 min on floor with  > 50% face to face view and 100% in direct patient care/counseling/consulting with new orders today.

## 2021-06-07 NOTE — PROGRESS NOTES
"Received bedside report from night shift RN.   Assumed care of patient at change of shift.   Assessment complete and POC discussed.   Patient is A&Ox4, VSS, on 2L of oxygen via NC.   Patient doing well post capping trial.   No episodes of desatting since capping trial was initiated yesterday.  Patient reports 9/10 pain in back, too early for oxycodone.   Patient okay with waiting until next dose is due. Gave scheduled tylenol.  Patient is sitting up in bed for breakfast.   SLP to evaluate patient today to see if patient's diet can be advanced.  Bed is in lowest/locked position.   Call light and belongings are within reach.   No further needs at this time.    1030: Patient requesting fluconazole for \"yeast infection.\" Patient states \"I get frequent yeast infections when I am on antibiotics.\" This RN asked patient to describe her symptoms, patient states \"I have odor, discharge, and itchiness.\" MD notified, new orders to follow for testing.    1300: Patient swabbed for vaginal fungal culture. Sample sent to lab.    1500: Patient weaned down to 1L of oxygen via NC. Patient satting at 94%.    Assessment/description of ears? Intact, red, blanching  Which preventative measures are in place for the ears? Grey foam on oxygen tubing, patient encouraged to remove glasses when not in use    Assessment/description of elbows? Intact, pink, blanching  Which preventative measures are in place for the elbows? Patient is able to turn self independently    Assessment/description of sacrum? Red, blanching, excoriated, open, scant bleeding  Which preventative measures are in place for the sacrum? Nystatin, barrier paste, dimethicone wipes    Assessment/description of heels? Red, blanching, boggy  Which preventative measures are in place for the heels? Mepliex, patient is able to turn self independently    Which devices are in place? Tracheostomy, glasses, NC  Description of skin under devices: Intact, red, blanching  L anterior neck " near trach - healing, slow to ty red [place] (picture uploaded to media)  Which preventative measures are in place under devices? Padding, grey foam tubing, PRN dressing changes    Other:  R 2nd toe knuckle red & slow to ty  R lateral ankle redness, slow to ty  Groin is red, excoriated, open  R great toe - DTI  L 3rd toe DTI     Interventions In Place InterDry, Heel Mepilex, Pillows, Q2 Turns, ZFlo Pillow, Heels Loaded W/Pillows and Pressure Redistribution Mattress

## 2021-06-08 LAB
ANION GAP SERPL CALC-SCNC: 7 MMOL/L (ref 7–16)
BUN SERPL-MCNC: 4 MG/DL (ref 8–22)
CALCIUM SERPL-MCNC: 8.8 MG/DL (ref 8.5–10.5)
CHLORIDE SERPL-SCNC: 96 MMOL/L (ref 96–112)
CO2 SERPL-SCNC: 30 MMOL/L (ref 20–33)
CREAT SERPL-MCNC: 0.25 MG/DL (ref 0.5–1.4)
GLUCOSE SERPL-MCNC: 80 MG/DL (ref 65–99)
POTASSIUM SERPL-SCNC: 3.9 MMOL/L (ref 3.6–5.5)
SODIUM SERPL-SCNC: 133 MMOL/L (ref 135–145)

## 2021-06-08 PROCEDURE — 94760 N-INVAS EAR/PLS OXIMETRY 1: CPT

## 2021-06-08 PROCEDURE — 770006 HCHG ROOM/CARE - MED/SURG/GYN SEMI*

## 2021-06-08 PROCEDURE — 700111 HCHG RX REV CODE 636 W/ 250 OVERRIDE (IP): Performed by: STUDENT IN AN ORGANIZED HEALTH CARE EDUCATION/TRAINING PROGRAM

## 2021-06-08 PROCEDURE — 99232 SBSQ HOSP IP/OBS MODERATE 35: CPT | Performed by: HOSPITALIST

## 2021-06-08 PROCEDURE — A9270 NON-COVERED ITEM OR SERVICE: HCPCS | Performed by: HOSPITALIST

## 2021-06-08 PROCEDURE — 80048 BASIC METABOLIC PNL TOTAL CA: CPT

## 2021-06-08 PROCEDURE — 700102 HCHG RX REV CODE 250 W/ 637 OVERRIDE(OP): Performed by: HOSPITALIST

## 2021-06-08 PROCEDURE — 700111 HCHG RX REV CODE 636 W/ 250 OVERRIDE (IP): Performed by: INTERNAL MEDICINE

## 2021-06-08 RX ADMIN — DIPHENHYDRAMINE HYDROCHLORIDE 50 MG: 50 INJECTION INTRAMUSCULAR; INTRAVENOUS at 05:04

## 2021-06-08 RX ADMIN — DIPHENHYDRAMINE HYDROCHLORIDE 50 MG: 50 INJECTION INTRAMUSCULAR; INTRAVENOUS at 18:18

## 2021-06-08 RX ADMIN — DIPHENHYDRAMINE HYDROCHLORIDE 50 MG: 50 INJECTION INTRAMUSCULAR; INTRAVENOUS at 12:17

## 2021-06-08 RX ADMIN — DOCUSATE SODIUM 50 MG AND SENNOSIDES 8.6 MG 2 TABLET: 8.6; 5 TABLET, FILM COATED ORAL at 04:55

## 2021-06-08 RX ADMIN — CEFAZOLIN SODIUM 2 G: 2 INJECTION, SOLUTION INTRAVENOUS at 14:04

## 2021-06-08 RX ADMIN — METHADONE HYDROCHLORIDE 30 MG: 10 TABLET ORAL at 05:02

## 2021-06-08 RX ADMIN — DIVALPROEX SODIUM 500 MG: 125 CAPSULE ORAL at 14:04

## 2021-06-08 RX ADMIN — ENOXAPARIN SODIUM 40 MG: 40 INJECTION SUBCUTANEOUS at 04:55

## 2021-06-08 RX ADMIN — NYSTATIN: 100000 POWDER TOPICAL at 04:56

## 2021-06-08 RX ADMIN — OXYCODONE 5 MG: 5 TABLET ORAL at 12:16

## 2021-06-08 RX ADMIN — GABAPENTIN 300 MG: 300 CAPSULE ORAL at 21:05

## 2021-06-08 RX ADMIN — GABAPENTIN 300 MG: 300 CAPSULE ORAL at 05:02

## 2021-06-08 RX ADMIN — ACETAMINOPHEN 500 MG: 325 TABLET ORAL at 08:53

## 2021-06-08 RX ADMIN — CEFAZOLIN SODIUM 2 G: 2 INJECTION, SOLUTION INTRAVENOUS at 21:11

## 2021-06-08 RX ADMIN — DIVALPROEX SODIUM 500 MG: 125 CAPSULE ORAL at 21:04

## 2021-06-08 RX ADMIN — CLOTRIMAZOLE 1 APPLICATOR: 1 CREAM VAGINAL at 21:11

## 2021-06-08 RX ADMIN — OXYCODONE 5 MG: 5 TABLET ORAL at 02:08

## 2021-06-08 RX ADMIN — CEFAZOLIN SODIUM 2 G: 2 INJECTION, SOLUTION INTRAVENOUS at 06:08

## 2021-06-08 RX ADMIN — GABAPENTIN 300 MG: 300 CAPSULE ORAL at 14:04

## 2021-06-08 RX ADMIN — OXYCODONE 5 MG: 5 TABLET ORAL at 18:18

## 2021-06-08 RX ADMIN — DOCUSATE SODIUM 50 MG AND SENNOSIDES 8.6 MG 2 TABLET: 8.6; 5 TABLET, FILM COATED ORAL at 17:16

## 2021-06-08 RX ADMIN — PAROXETINE HYDROCHLORIDE 10 MG: 10 TABLET, FILM COATED ORAL at 04:54

## 2021-06-08 RX ADMIN — NYSTATIN: 100000 POWDER TOPICAL at 12:17

## 2021-06-08 RX ADMIN — NYSTATIN: 100000 POWDER TOPICAL at 17:16

## 2021-06-08 RX ADMIN — DIVALPROEX SODIUM 500 MG: 125 CAPSULE ORAL at 04:55

## 2021-06-08 RX ADMIN — DULOXETINE HYDROCHLORIDE 60 MG: 60 CAPSULE, DELAYED RELEASE ORAL at 04:55

## 2021-06-08 RX ADMIN — ACETAMINOPHEN 500 MG: 325 TABLET ORAL at 21:04

## 2021-06-08 RX ADMIN — RISPERIDONE 2 MG: 2 TABLET ORAL at 04:55

## 2021-06-08 RX ADMIN — RISPERIDONE 2 MG: 2 TABLET ORAL at 17:16

## 2021-06-08 RX ADMIN — DULOXETINE HYDROCHLORIDE 60 MG: 60 CAPSULE, DELAYED RELEASE ORAL at 17:16

## 2021-06-08 RX ADMIN — ACETAMINOPHEN 500 MG: 325 TABLET ORAL at 15:29

## 2021-06-08 ASSESSMENT — ENCOUNTER SYMPTOMS
FEVER: 0
HEMOPTYSIS: 0
PALPITATIONS: 0
WEAKNESS: 1
DIARRHEA: 0
CHILLS: 0
NAUSEA: 0
NAUSEA: 1
SPUTUM PRODUCTION: 0
COUGH: 0
CONSTIPATION: 0
ABDOMINAL PAIN: 0
ORTHOPNEA: 0
SHORTNESS OF BREATH: 0
VOMITING: 0
MYALGIAS: 1
DIZZINESS: 0

## 2021-06-08 ASSESSMENT — PAIN DESCRIPTION - PAIN TYPE
TYPE: ACUTE PAIN

## 2021-06-08 NOTE — PROGRESS NOTES
Infectious Disease Progress Note    Author: Eric Lowery M.D.. Date & Time created: 6/8/2021  4:20 PM     Cefazolin 5/18-5/19  Target 6/14/21     Thoracoscopy & Decortication - 4/28  Decortication 5/17     PRIOR Rx:   Zosyn 4/22-4/23  Previous: Unasyn, Zosyn, Vanco, Daptomycin  Ceftaroline: start 4/13-4/15  Nafcillin 2g Q4 hrs 4/15-4/23      Meropenem 5/19-5/26   Cefepime 4/23 5/18 4/14: Unable to give name of previous NSG or neurologist. Seems confused, but able to say some sentences fluently.   4/15: Line cultures now growing MSSA. As well as from the blood. Repeat blood culture 4/13+ in both sets. Patient has worsening confusion. CSF fluid analyses suggest pleocytosis. Cultures are no growth from the CSF. Chest CT was ordered this morning indicating a loculated right pleural effusion as well as bilateral septic emboli. Dr. Mack spoke with Dr. Oh yesterday and no surgical intervention unless clinical deterioration.  4/16: Increased respiratory distress.  Tachypneic.  CT with loculated collection.  Dr Resendiz not available.  No thoracic surgeon available.  Plans to have pulmonary place CT.  Transferring to ICU.  4/17: Transferred to [facility] last night. Hgb dropped to 6.4 requiring PRBC transfusion. Requiring 2 pressors. Fio2 50%. Febrile 38.8. Pending OR decortication of empyema and hemothorax. Chest tube placed at [facility] shows 8,371 WBC, ,000, 74% N  4/18: Chest tube was upsized by surgery yesterday, no decortication. WBC 22k. Fio2 40%. Vasopressin. Levophed down to 2mcg. Afebrile 24 hrs. Last fever 38.6 on 4/16.   4/19: Still on vent. Levo 3 mcg, vasopressin. Afebrile 72 hours. WBC 21k. BC 4/17 NGTD x 48 hours. Unable to do MRI brain given neurostimulator per RN.   4/20: Remaining afebrile. Hb 6.2 and undergoing transfusion today.WBC 24,100, 5% bands.1700 ml CT output. Copious bloody ET secretions. No pressors. Receiving dornase and TPA per CT. Right pleural effusion not large per CXR  today.   4/21: WBC 31k. Bronchoscopy yesterday showing blood. Cultures sent. TPA on hold per RN due to arvind blood from chest tube requiring PRBC transfusion for hgb 6. Pending chest CT today. Per , spinal stimulator is non-MRI compatible. Levophed 10mcg. 30% Fio2. Afebrile.   4/22: Fever 101.3 this am. WBC 20,300 down from 32,200 yesterday. BAL growing Klebsiella pneumoniae but susceptibility panel not set up until today. CTA of brain shows no lesion. CT of chest shows enlarging cavitary lung lesions bilat and large loculated new left pleural effusion and large hydropneumothorax on right. TPA & dornase infusions of right chest ended 4/20 after considerable bleeding requiring transfusion. Hb now down again to 6.8.  4/23: WBC 23k. Fio2 40%. Tmax 38.1. US of stiumlator shows small fluid collection but no drainable abscess. Pending MICAH today. Pending L chest tube placement  4/24: Continue fever 100.8-101.8. WBC 16,600. MICAH shows large vegetations on both tricuspid valve and mitral valve with mild TR & MR.  No aortic root abscess. Left chest tube placed yesterday yielding 8 ml of old bloody fluid. Bronch today revealed copious thick maroon secretions in distal trachea and both mainstem bronchi. On the right these were obstructive. Remaining off pressors. Last positive blood cultures (MSSA) were 4/16, negative 4/17.  4/25: Fever 100.6 this AM. wBC 13,300. Hb 6.6. CT: right hydropneumothorax increasing, right bronchopleural fistula, mediastinal shift ot the left , multiple cavitary pulmonary nodules, 3.1 cm left basilar mass, splenomegaly. CT of head shows dural thickening over the clivus. Lac+ GNR >100,000 col/ml growing from BAL of 4/24, presumed Klebsiella. Left peural fluid culture negative from 4/23.  4/26: Fever 100.4 last night. WBC 13,500. Hb 7.4. Plts 502,000. ESR >120. UA fairly clear except 30 mg% protein, 2-5 wbc and rare rbc. CXR unchanged except more prominent pulmonary edema. GNR in BAL may be a  different species of Klebsiella, and resistant to ampicillin & tmp-sulfa; confirmation pending.  4/28: Fever 100.8 last night. WBC 27,700. Hb 6.6. Plts 482,000. Creat 0.19. Kleb pneum in BAL on 4/24 is resistant to ampicillin & tmp-sulfa but otherwise sensitive. O2 sat 96% on FIO2 30% now, PEEP 8. Has been re-evaluated by thoracic surgeon, Dr. Ganser, who believes she will not wean without decortication on the right. Surgery anticipated today.  4/29: S/P right thoracoscopy with decortication with cultures NG at 24 hrs.  4/30: Had a bronch due to hypoxia and hypotension. CXR with worsening right hemithorax pulmonary opacities. Bloody mucous plugs removed. Pressors started. Febrile this am. Tachy in 130s. Pressors just removed. Tolerating vent, but not a SBT candidate at the moment.  5/1: Small air leak CT-2 every ~ 2/3 breathes. The K. pneumoniae from her thoracoscopy is sensitive to her cefepime so no antibiotic change needed.   5/2: No air leak today she tolerated 2  hrs of spontaneous breathing with support today.      5/3: Second day with SBT and doing well now for 2 hrs. Slight bump in temperature and      WBC's but no obvious clinical infectious changed so watch closely.      5/4: Fever still from time to time. WBCs trending up. Chest tubes in place. Trach.       5/5: She clearly is better today she was sitting up in bed with open eyes and follows commands. She still has low grade fever but her WBC's are dropping. Cefepime dose increased yesterday for increased CNS coverage if necessary. She will probably need further thoracic surgery as mentioned by Dr. Ganser. The issue of what to do with her stimulator is still up in the air for now as it probably will need to be removed but she is nort a candidate for more major surgery at this time.       5/7: She continues to improve and under go longer SBT trials. She just had a new PICC placed in her right arm and plan is to D/C central line.        5/8: PICC placed. No  fevers. Comfortable. Cortrak placed. Failing SBTs.  5/9: No acute issues. No fevers. Comfortable. Family at bedside.   5/10: No acute issues, fevers or WBC bumps. No changes in condition. She is to get her CT scan today and be reviewed by surgery  5/11:  at bedside.  Has been referred to LANE.  Repeat CT scan completed.  Results noted.  ? If Dr Ganser has seen.  Still with an empyema:  ? If candidate for further surgery.  5/12: Pending next thoracic surgery. No fever. Anxious.  5/13: ? Surgery date.  No one seems to know.  Had speaking valve in and having swallow eval with speech therapy  5/14: No acute issues. No fevers. Surgery Monday.  5/15: One CT accidentally pulled out yesterday.  Has been on T piece and tolerating. Plans for surgery Monday afternoon.  5/16: On schedule for surgery 5/17: 4:30 pm.  Moved to room T614.  No new issues.  5/17: Was sitting up in the bedside chair since change of shift.  Now walking in hallways with RN and CNA.  5/18: Decortication yesterday with 2 chest tube placement. WBC improving 21->14k  5/19: Hypotension and tachycardia.  Decreased H/H.  Placed back on vent to rest.  Dr Lozano in process of placing new line.  Antibiotics were deesculated yesterday to Cefazolin.  No cx were taken at surgery.  5/20: Pt was febrile yest afternoon 38.2, but now afebrile overnight after meropenem. WBC improved 11k->8k. Fio2 30%. Phenylephrine now off this morning. CXR shows new R consolidations.   5/21: Off pressors.  On vent: FiO2 30%, PEEP 8, PS 5 Received pRBCs yesterday.  5/22: Remains afebrile, off pressors. FiO2 30%.   5/23: Afebrile. FiO2 30%. Bronchoscopy yesterday normal.   5/24: No new cultures obtained at last bronchoscopy.  On  T piece this am: FiO2 30%.  5/25: Remaining afebrile. WBC 8600.   5/26: Chest tube removed today. Possible transfer to [facility].  5/27: O2 sat usuallyl 95% but intermittent desat to 87-89%.  Remaining afebrile.WBC 6100. Alk phos 641 but normal transaminases.    5/28: Pending [facility] transfer. NO new issues overnight  5/29: Awaiting bed at [facility]. Remaining afebrile. WBC 5800, creat 0.19.  5/31. Remaining afebrile. WBC 6000. . Remains weak and frail. Alk Phos 694 with normal transaminases. Slightly improved bilateral patchy infiltrates.   6/1: She is anxious to move on but still waiting for insurance approval to [facility].  6/2: Still awaiting bed at [facility]. Remaining afebrile. WBC 8300. Alk Phos 705 but transaminases normal and bilirubine 0.4.  6/4: Awaiting insurance auth before bed can be assigned at [facility]. Remaining afebrile. Extensive folliculitis over back and buttocks.  WBC 8300 on 6/2. Last  on 5/31.  6/5: Her ESR is down to 53 from 102 a good sign hopefully. She continues to work on her strength by walking.  6/6: Speech evaluation to check her swallow as she is stronger and can't stand pureed.  6/7: [facility] was denied by insurance. Could she go to full rehab now? Swallow eval pending.clotrimazole for vaginal drainage and itch.  6/8: Continued to be declined by insurances. No issues clinically. Physical Med declined for rehab. No fevers.    Interval History:  Past 24 hr reviewed with RN.     Labs Reviewed, Medications Reviewed, Radiology Reviewed, Wound Reviewed, Fluids Reviewed and GI Nutrition Reviewed    Review of Systems:  Review of Systems   Constitutional: Negative for chills and fever.   Respiratory: Negative for cough and shortness of breath.    Cardiovascular: Negative for chest pain.   Gastrointestinal: Negative for abdominal pain, constipation, diarrhea, nausea and vomiting.   Skin: Itching: posteror buttock.   Neurological: Positive for weakness.       Physical Exam:  Physical Exam  Vitals and nursing note reviewed.   Constitutional:       General: She is not in acute distress.     Appearance: Normal appearance. She is not ill-appearing, toxic-appearing or diaphoretic.   HENT:      Head: Normocephalic and atraumatic.   Cardiovascular:      Rate and Rhythm:  Normal rate and regular rhythm.      Heart sounds: No murmur heard.     Pulmonary:      Effort: Pulmonary effort is normal. No respiratory distress.      Breath sounds: Normal breath sounds. No wheezing or rhonchi.   Abdominal:      General: Abdomen is flat. There is no distension.      Palpations: Abdomen is soft.      Tenderness: There is no abdominal tenderness. There is no guarding.   Skin:     General: Skin is warm and dry.   Neurological:      Mental Status: She is oriented to person, place, and time.   Psychiatric:      Comments: Flat  But smiling.         Labs:  Recent Results (from the past 24 hour(s))   Basic Metabolic Panel    Collection Time: 21  5:30 AM   Result Value Ref Range    Sodium 133 (L) 135 - 145 mmol/L    Potassium 3.9 3.6 - 5.5 mmol/L    Chloride 96 96 - 112 mmol/L    Co2 30 20 - 33 mmol/L    Glucose 80 65 - 99 mg/dL    Bun 4 (L) 8 - 22 mg/dL    Creatinine 0.25 (L) 0.50 - 1.40 mg/dL    Calcium 8.8 8.5 - 10.5 mg/dL    Anion Gap 7.0 7.0 - 16.0   ESTIMATED GFR    Collection Time: 21  5:30 AM   Result Value Ref Range    GFR If African American >60 >60 mL/min/1.73 m 2    GFR If Non African American >60 >60 mL/min/1.73 m 2       Hemodynamics:  Temp (24hrs), Av.4 °C (97.6 °F), Min:36.2 °C (97.2 °F), Max:36.8 °C (98.2 °F)  Temperature: 36.2 °C (97.2 °F)  Pulse  Av.1  Min: 40  Max: 220   Blood Pressure: 116/67    PICC Double Lumen 21 Lumen 1: Purple Lumen 2: Red Right Brachial (Active)   Site Assessment Clean;Dry;Intact 21   Lumen 1 Status Scrubbed the hub prior to access;Flushed;Blood return noted 21   Lumen 2 Status Scrubbed the hub prior to access;Flushed;Blood return noted 21   Line Secured at (cm) 1 cm 21 1118   Extremity Circumference (cm) 30.5 cm 21 1118   Dressing Type Biopatch;Occlusive;Securing device;Transparent 21   Dressing Status Clean;Dry;Intact 21   Dressing Intervention N/A 21    Dressing Change Due 06/12/21 06/08/21 0845   Date Primary Tubing Changed 06/05/21 06/05/21 2000   Date Secondary Tubing Changed 06/08/21 06/08/21 0845   Date IV Connector(s) Changed 05/29/21 05/29/21 2100   NEXT Primary Tubing Change  06/08/21 06/05/21 2000   NEXT Secondary Tubing Change  06/09/21 06/08/21 0845   NEXT IV Connector(s) Change 05/30/21 05/29/21 2100   Line Necessity Assessed Antibiotic Therapy Greater than 7 Days 06/06/21 2020   $ Double Lumen PICC Charge Single kit used 05/07/21 1118     Wound:  @WOUNDLDA(4)@     Fluids:  Intake/Output                             06/06/21 0700 - 06/07/21 0659 06/07/21 0700 - 06/08/21 0659 06/08/21 0700 - 06/09/21 0659     8313-2225 [id number] Total 9025-6676 6479-4508 Total 3926-7236 0170-8274 Total                    Intake    P.O.  120  -- 120  815  120 935  --  -- --    P.O. 120 -- 120 815 120 935 -- -- --    Other  --  -- --  --  -- --  50  -- 50    Medications (PO/Enteral Liquids) -- -- -- -- -- -- 50 -- 50    Total Intake 120 -- 120 815 120 935 50 -- 50       Output    Urine  --  -- --  --  -- --  --  -- --    Number of Times Voided -- -- -- 1 x -- 1 x 3 x -- 3 x    Stool  --  -- --  --  -- --  --  -- --    Number of Times Stooled -- -- -- 3 x -- 3 x -- -- --    Total Output -- -- -- -- -- -- -- -- --       Net I/O     120 -- 120 815 120 935 50 -- 50           GI/Nutrition:  Orders Placed This Encounter   Procedures   • Diet Order Diet: Level 5 - Minced and Moist; Liquid level: Level 2 - Mildly Thick; Tray Modifications (optional): SLP - Deliver to Nursing Station, SLP - 1:1 Supervision by Nursing, No Straws     Standing Status:   Standing     Number of Occurrences:   1     Order Specific Question:   Diet:     Answer:   Level 5 - Minced and Moist [24]     Order Specific Question:   Liquid level     Answer:   Level 2 - Mildly Thick     Order Specific Question:   Tray Modifications (optional)     Answer:   SLP - Deliver to Nursing Station     Order Specific  Question:   Tray Modifications (optional)     Answer:   SLP - 1:1 Supervision by Nursing     Order Specific Question:   Tray Modifications (optional)     Answer:   No Straws     Medications:  Current Facility-Administered Medications   Medication Last Admin   • clotrimazole (LOTRIMIN) 1 % vaginal cream 1 Applicator 1 Applicator at 06/07/21 2100   • nystatin (MYCOSTATIN) powder Given at 06/08/21 1217   • diphenhydrAMINE (BENADRYL) injection 50 mg 50 mg at 06/08/21 1217   • acetaminophen (TYLENOL) tablet 500 mg 500 mg at 06/08/21 1529   • divalproex (DEPAKOTE SPRINKLE) capsule 500 mg 500 mg at 06/08/21 1404   • DULoxetine (CYMBALTA) capsule 60 mg 60 mg at 06/08/21 0455   • gabapentin (NEURONTIN) capsule 300 mg 300 mg at 06/08/21 1404   • methadone (DOLOPHINE) tablet 30 mg 30 mg at 06/08/21 0502   • PARoxetine (PAXIL) tablet 10 mg 10 mg at 06/08/21 0454   • risperiDONE (RISPERDAL) tablet 2 mg 2 mg at 06/08/21 0455   • senna-docusate (PERICOLACE or SENOKOT S) 8.6-50 MG per tablet 2 tablet 2 tablet at 06/08/21 0455    And   • polyethylene glycol/lytes (MIRALAX) PACKET 1 Packet      And   • magnesium hydroxide (MILK OF MAGNESIA) suspension 30 mL      And   • bisacodyl (DULCOLAX) suppository 10 mg     • acetaminophen (Tylenol) tablet 650 mg 650 mg at 05/30/21 0457   • diazePAM (VALIUM) tablet 5 mg 5 mg at 06/07/21 2103   • oxyCODONE immediate-release (ROXICODONE) tablet 5 mg 5 mg at 06/08/21 1216   • ceFAZolin in dextrose (ANCEF) IVPB premix 2 g Stopped at 06/08/21 1450   • enoxaparin (LOVENOX) inj 40 mg 40 mg at 06/08/21 0455   • Respiratory Therapy Consult     • ondansetron (ZOFRAN) syringe/vial injection 4 mg 4 mg at 06/02/21 0703   • labetalol (NORMODYNE/TRANDATE) injection 10 mg 10 mg at 05/04/21 0226   • ipratropium-albuterol (DUONEB) nebulizer solution       Medical Decision Making, by Problem:  Active Hospital Problems    Diagnosis    • *Empyema of right pleural space (HCC) [J86.9]    • Shock ([facility]) [R57.9]    •  Debility [R53.81]    • Elevated alkaline phosphatase level [R74.8]    • Agitation requiring sedation protocol [R45.1]    • Spinal cord stimulator status [Z96.89]    • Goals of care, counseling/discussion [Z71.89]    • Fever [R50.9]    • Pulmonary abscess (HCC) [J85.2]    • 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]    • Pleural effusion, left [J90]    • Anasarca [R60.1]    • Thrombocytosis (HCC) [D47.3]    • Atelectasis [J98.11]    • Acute respiratory failure with hypoxia (HCC) [J96.01]    • Prolonged Q-T interval on ECG [R94.31]    • Acute bacterial endocarditis [I33.0]    • CSF pleocytosis [D72.9]    • Bacteremia due to methicillin susceptible Staphylococcus aureus (MSSA) [R78.81, B95.61]    • History of vasculitis [Z86.79]    • Pneumonia [J18.9]    • History of complicated migraines [G43.109]    • GERD (gastroesophageal reflux disease) [K21.9]    • Hyponatremia [E87.1]    • Tachycardia [R00.0]    • Pseudotumor cerebri [G93.2]    • H/O: CVA (cerebrovascular accident) [Z86.73]    • Chronic pain syndrome [G89.4]    • Port or reservoir infection [T80.212A]    • Thrombocytopenia (HCC) [D69.6]    • Septic shock (HCC) [A41.9, R65.21]    • Anemia [D64.9]    • Sepsis (HCC) [A41.9]    • Hypokalemia [E87.6]    • Hypomagnesemia [E83.42]    • S/P  shunt [Z98.2]    • Anxiety [F41.9]    • Folliculitis [L73.9]    • Acute encephalopathy [G93.40]      Impression   -Severe sepsis  -Catheter related bloodstream infection due to infected port status post removal 4/12-staph aureus  -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       - Acute left empyema s/p chest tube placement 4/23, decortication 5/17, chest tube removal 5/26 - Klebsiella  -Multiple acute septic pulmonary emboli bilaterally  -Acute bilateral pneumonia due to above     --Pulmonary edema  -Pseudotumor cerebri with underlying  shunt: possible arachnoiditis  -Chronic  migraine headaches  -History of autoimmune vasculitis  -Elevated alkaline phosphatase & bilirubin  -Acute encephalopathy due to sepsis      - Anemia due to above      - Acute fever on 5/19 likely from secondary VAP      - Secondary VAP R sided pneumonia      - decubitus ulcer sacral.      Plan   Continue cefazolin - 4 week target date 6/14/21 - started after decortication  Aggressive PT  Repeat CT of chest prior to stopping abx given complicated infiltrative dx.  Will need suppression after therapy for her HW to include spinal stimulator,  shunt   - Given MSSA - Keflex appropriate  No changes to therapy today  Dispo:   - Declined by [facility] and Rehab    Discussed case with patient and RN    Thank you for this consult.    Dr Lowery

## 2021-06-08 NOTE — DISCHARGE PLANNING
Received Choice form at 1394  Agency/Facility Name: 1) [facility] 2) [facility] 3) [facility]  Referral sent per Choice form @ 5225

## 2021-06-08 NOTE — PROGRESS NOTES
Bedside report received. Pt is A&O X4, on 1L O2, NC, VSS. Pt denies having pain at present time. Pt is on trach capping trial, having no issues at current time. She is able to ambulate with SB assist, FWW. Hourly rounds are in place. POC discussed with pt; all questions answered at this time.

## 2021-06-08 NOTE — DISCHARGE PLANNING
[facility] Transitional Care Coordination    Referral from:  Dr. Brian Omer  Insurance Provider on Facesheet: [employer] HMO  Potential Rehab Diagnosis: Pulmonary Debility    Chart review indicates patient has on going medical management and therapy needs to possibly meet inpatient rehab facility criteria with the goal of returning to community.    D/C support: Spouse - to be verified     Physiatry to consult.  IV abx to 6/14/21.      Last Covid test:  5/17/2021 not detected    Please note - [employer] Medicare HMO is OON [facility] - will f/u to determine if there is possibility for OON benefit.      Thank you for the referral.

## 2021-06-08 NOTE — CARE PLAN
The patient is Watcher - Medium risk of patient condition declining or worsening    Shift Goals  Clinical Goals: O2 sats will remain above 90  Patient Goals: Tolerate capping trial  Family Goals: NA    Progress made toward(s) clinical / shift goals: Assess and monitor pt respiratory status. Titrate O2 per pt tolerance. Collaborate with interdisciplinary team regarding pt care.     Patient is not progressing towards the following goals:

## 2021-06-08 NOTE — DISCHARGE PLANNING
Agency/Facility Name: [facility]  Spoke To: Kiki   Outcome: Kiki stated they are still waiting for ins. auth. May take up to 72 hrs to hear back.

## 2021-06-08 NOTE — PROGRESS NOTES
Assessment/description of ears? Red/pink, intact  Which preventative measures are in place for the ears?  Grey foam on oxygen tubing, remove glasses when sleeping    Assessment/description of elbows?Pink, blanching, intact  Which preventative measures are in place for the elbows?  Pt ambulates, repositions self frequently    Assessment/description of sacrum? Red, excoriated, open and scabbed small blister like sores. Barrier paste applied  Which preventative measures are in place for the sacrum?  Pt ambulates, repositions self frequently, barrier paste applied, Nystatin    Assessment/description of heels? Red, boggy, intact  Which preventative measures are in place for the heels?  Pt ambulates, repositions self frequently    Which devices are in place? Tracheostomy, glasses, PICC, NC  Description of skin under devices: Intact, blanching  Which preventative measures are in place under devices?  Pt ambulates, repositions self frequently    Other: Groin-red, excoriated; R, 2nd toe-red, slow to ty; R Great toe-pressure injury; L 3rd toe-pressure injury

## 2021-06-08 NOTE — PROGRESS NOTES
Assessment/description of ears? Intact, pink, blanching  Which preventative measures are in place for the ears? Encourage pt to remove glasses when not in use, foam padding on silicone oxygen tubing    Assessment/description of elbows? Intact, pink, blanching  Which preventative measures are in place for the elbows? Encourage pt to turn in bed    Assessment/description of sacrum? Pink, red, excoriated, peeling, rash noted   Which preventative measures are in place for the sacrum? Encourage to turn in bed, nystatin applied    Assessment/description of heels? Pink, boggy, blanching, intact  Which preventative measures are in place for the heels? mepilexes    Which devices are in place? PICC, glasses, nasal cannula  Description of skin under devices: CDI, blanching  Which preventative measures are in place under devices?    Other:  Groin: excoriated, red, pink, rash noted  Chest tube [place] dressing to R side  R great toe pressure injury  L 3rd toe pressure injury

## 2021-06-08 NOTE — PROGRESS NOTES
Received report from NOC RN and assumed care of pt. Pt is A&Ox4 resting in bed on 2L O2 via nasal cannula. Pt reports 6/10 pain, medicated per MAR. POC discussed with pt; all questions answered. No other needs at this time. Bed locked in lowest position, call light within reach, pt educated to call before getting out of bed, verbalized understanding.

## 2021-06-08 NOTE — THERAPY
Occupational Therapy  Daily Treatment     Patient Name: Enedelia Pang  Age:  50 y.o., Sex:  female  Medical Record #: [id number]  Today's Date: 6/7/2021     Precautions: (P) Fall Risk, Swallow Precautions ( See Comments)  Comments: (P) tache capped    Assessment    Pt progressing well with therapy today. Pt's trache was capped upon arrival. Pt was able to verbalize all needs today with increased vocal quality. Pt demonstrated improved postural alignment with reduced tactile/verbal feedback (requires min/mod cueing for neck in neutral but able to self correct 50% of the time). Pt continues to require Brenda/supervision level of assist with self cares. Pt would benefit from post-acute placement to progress function (or home with increased assist and HH OT if unable to obtain placement).     Plan    Continue current treatment plan.    DC Equipment Recommendations: (P) Unable to determine at this time  Discharge Recommendations: (P) Recommend post-acute placement for additional occupational therapy services prior to discharge home (If DC home with increased assist recommend HHOT)       06/07/21 9042   Precautions   Precautions Fall Risk;Swallow Precautions ( See Comments)   Comments tache capped   Pain   Pain Scales 0 to 10 Scale    Intervention Ambulation / Increased Activity;Breathing technique;Massage   Pain 0 - 10 Group   Location Shoulder   Location Orientation Right   Pain Rating Scale (NPRS) 5   Description Aching   Comfort Goal Comfort with Movement;Perform Activity   Therapist Pain Assessment 0;Post Activity Pain Same as Prior to Activity;Nurse Notified   Cognition    Comments improved vocal quality; able to answer questions fully with increased processing time   Active ROM Upper Body   Comments Limited L shoulder ROM (uses compensatory shoulder elevation strategies to flex/abduct at shoulder). Taught massage techniques to L upper trapezius areas   Other Treatments   Other Treatments Provided treatment focused on  seated and standing G/H tasks using compensatory techniques for neutral positioning (tactile feeback)   Balance   Sitting Balance (Static) Fair +   Sitting Balance (Dynamic) Fair   Standing Balance (Static) Fair   Standing Balance (Dynamic) Fair -   Weight Shift Sitting Fair   Weight Shift Standing Fair   Skilled Intervention Verbal Cuing;Tactile Cuing;Postural Facilitation   Comments Challenged postural muscles seated EOB for G/H (with cueing for posture throughout). Became dizzy and naseous with standing grooming tasks; elected to return to seated   Bed Mobility    Supine to Sit Supervised   Sit to Supine Supervised   Scooting Supervised   Rolling Supervised   Activities of Daily Living   Grooming Minimal Assist;Seated;Standing   Skilled Intervention Verbal Cuing;Tactile Cuing;Sequencing;Postural Facilitation;Compensatory Strategies   Comments Initally elected to complete tooth brushing from seated position. Required verbal/tactile cues for neutral allignment. Pt chose to complete hair brushing in standing (became dizzy/naseous and returned to seated)   How much help from another person does the patient currently need...   Putting on and taking off regular lower body clothing? 4   Bathing (including washing, rinsing, and drying)? 3   Toileting, which includes using a toilet, bedpan, or urinal? 3   Putting on and taking off regular upper body clothing? 3   Taking care of personal grooming such as brushing teeth? 3   Eating meals? 2   6 Clicks Daily Activity Score 18   Functional Mobility   Sit to Stand Supervised   Transfer Method Stand Step   Mobility room distances   Activity Tolerance   Sitting Edge of Bed 20 min   Standing 5 min   Comments limited by anxiety    Patient / Family Goals   Patient / Family Goal #1 to get better   Goal #1 Outcome Progressing as expected   Short Term Goals   Short Term Goal # 1 LB dressing with min A   Goal Outcome # 1 Goal met, new goal added   Short Term Goal # 1 B  Pt will demo LB  dressing w/ SPV   Goal Outcome  # 1 B Progressing as expected   Short Term Goal # 2 seated g/h with SPV   Goal Outcome # 2 Goal met, new goal added   Short Term Goal # 2 B  standing g/h with SPV   Goal Outcome # 2 B Progressing as expected   Short Term Goal # 3 bsc txf with mod A   Goal Outcome # 3 Goal met, new goal added   Short Term Goal # 3 B toilet txf with SPV and no v/cs for safety/technique   Goal Outcome # 3 B Progressing as expected   Education Group   Role of Occupational Therapist Patient Response Patient;Acceptance;Explanation;Reinforcement Needed;Action Demonstration   UE Strengthening Patient Response Patient;Acceptance;Demonstration;Explanation;Reinforcement Needed;Verbal Demonstration   ADL Patient Response Patient;Acceptance;Explanation;Action Demonstration;Reinforcement Needed   Anticipated Discharge Equipment and Recommendations   DC Equipment Recommendations Unable to determine at this time   Discharge Recommendations Recommend post-acute placement for additional occupational therapy services prior to discharge home  (If DC home with increased assist recommend HHOT)   Interdisciplinary Plan of Care Collaboration   IDT Collaboration with  Nursing   Patient Position at End of Therapy Phone within Reach;Tray Table within Reach;Call Light within Reach;In Bed;Bed Alarm On   Collaboration Comments OT tx and recs   Session Information   Date / Session Number  6/7 (2/3, 6/9)   Priority 2

## 2021-06-08 NOTE — CONSULTS
Physical Medicine and Rehabilitation Consultation         Initial Consult      Initial Consultation Date: 6/8/2021  Consulting provider: Dr. Brian Omer  Reason for consultation: assess for acute inpatient rehab appropriateness  LOS: 53 Day(s)      Chief complaint: infection        HPI:   The patient is a 50 y.o. female with a past medical history of pseudotumor cerebri s/p  shunt, chronic pain s/p port that’s complicated by infections;  who presented on 4/16/2021  5:21 PM as a transfer for thoracic surgery evaluation, from [facility] for MSSA bacteremia from recurrent port infections. Port was removed 4/12/21 and was receiving antibiotics, but developed endocarditis.  Hospital course complicated by encephalopathy, septic emboli in bilateral lungs with large effusion/empyema, severe anemia requiring transfusion, respiratory distress requiring intubation. Prolonged hospital course, including: Right empyema s/p right chest tube on 4/17. Cardiac cath 4/23 with no evidence of left atrial appendage thrombus, did show large vegetations on tricuspid and mitral valve. EEG 4/23 without seizures captured. Loculated right hydropneumothorax with empyema s/p right thoracoscopy with evacuation of old clots and decortication on 4/28. Acute respiratory failure with complicated pneumonia requiring continued ventilator s/p percutaneous trach on 5/1, eventually weaned off ventilator. Right empyema s/p thoracotomy and decortication on 5/17. Neurosurgery recommended keeping the  shunt at this time.         Social Hx:  Spouse may be able to assist      Current level of function:   PT, 6/4: Superv FWW x100 ft; Superv bed mobility and transfers   OT, 6/7: Min ADLs; Superv bed mobility and transfers   SLP, 6/7: minced and moist/mildly thick liquids with 1:1 supervision        PMH:  Past Medical History:   Diagnosis Date   • Allergy, unspecified not elsewhere classified    • Anemia 10/05/2017    Unsure if a current issue.   • Anesthesia      Migrane after anesthesia   • Anxiety    • Anxiety, generalized    • Arthritis    • autoimmune    • Autoimmune disorder (HCC)     Unknown etiology or type   • Back pain    • Clostridium difficile diarrhea 2014    follow up tests negative   • Depression    • Elevated liver enzymes 2017    Related to meds.   • Fall    • H/O Clostridium difficile infection 2014   • Hx MRSA infection 2003    with neurostimulator implant   • Hydrocephalus (HCC) 2015    with lumbar shunt   • Hyponatremia 9/28/2019   • Joint pain 09/10/2019    Especially right shoulder   • Migraine with aura, with intractable migraine, so stated, without mention of status migrainosus     from undefined autoimmune issue   • MRSA (methicillin resistant Staphylococcus aureus) 08/2015    to lumbar shunt and power port   • MRSA (methicillin resistant Staphylococcus aureus) 12/2017    left foot autoimmune decay   • MRSA (methicillin resistant Staphylococcus aureus) 2018    to neurostimulator.    • Pituitary abnormality (HCC) 2002   • Port or reservoir infection 10/3/2015   • Requires supplemental oxygen     to treat migraines. 2-5L/NC   • Seizure (HCC) started 2017    Unknown etiology-not sure if related to autoimmune issue. Last seizure 2/2019   • Sepsis (HCC) 8/30/2015   • Staph infection    • Stroke ([facility]) 2007     with right side residual weakness         PSH:  Past Surgical History:   Procedure Laterality Date   • THORACOTOMY Right 5/17/2021    Procedure: THORACOTOMY;  Surgeon: John H Ganser, M.D.;  Location: [facility];  Service: General   • DECORTICATION  5/17/2021    Procedure: DECORTICATION, LUNG.;  Surgeon: John H Ganser, M.D.;  Location: [facility];  Service: General   • PB THORACOSCOPY,DX NO BX Right 4/28/2021    Procedure: THORACOSCOPY;  Surgeon: John H Ganser, M.D.;  Location: [facility];  Service: General   • DECORTICATION Right 4/28/2021    Procedure: DECORTICATION, LUNG;  Surgeon: John H Ganser, M.D.;  Location:  SURGERY [facility];  Service: General   • OTHER Left 2020    Port placement left chest   • PB IMPLANT NEUROSTIM/  2019    Procedure: INSERTION, NEUROSTIMULATOR, PERMANENT, SPINAL CORD;  Surgeon: Seven Mahoney M.D.;  Location: [facility];  Service: Pain Management   • SPINAL CORD STIMULATOR  2018    Explanted   • FULL THICKNESS SKIN GRAFT Left 2018     graft to foot (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   • OTHER SURGICAL PROCEDURE  11/10/2017    Power port   • LIVER BIOPSY  2017   •  SHUNT INSERTION  2017    Procedure:  SHUNT INSERTION;  Surgeon: Drew Berry M.D.;  Location: SURGERY [facility];  Service:    • SPINAL CORD STIMULATOR  2016    Procedure: SPINAL CORD STIMULATOR FOR: PLACEMENT OF LEFT FLANK OCCIPITAL NERVE STIMULATOR BATTERY PACK;  Surgeon: Oli Oh III, M.D.;  Location: SURGERY [facility];  Service:    • LUMBAR EXPLORATION N/A 2015    Procedure: LUMBAR EXPLORATION- Lumbar shunt removal ;  Surgeon: Oli Oh III, M.D.;  Location: SURGERY [facility];  Service:    • OTHER NEUROLOGICAL SURG  2015    Explanted lumbar shunt and explanted power port due to MRSA   • IRRIGATION & DEBRIDEMENT NEURO N/A 2015    Procedure: IRRIGATION & DEBRIDEMENT NEURO;  Surgeon: Oli Oh III, M.D.;  Location: SURGERY [facility];  Service:    • SPINAL CORD STIMULATOR      1st implant   • CHOLECYSTECTOMY      had ruptured day before   • ENDOMETRIAL ABLATION     • TUBAL COAGULATION LAPAROSCOPIC BILATERAL Bilateral    • KNEE ARTHROSCOPY Right    • TONSILLECTOMY     • APPENDECTOMY     • OTHER NEUROLOGICAL SURG  [id number]    occipital nerve stimulator-revisions for total of 9 procedures         FHX: Reviewed.  Family History   Problem Relation Age of Onset   • Mult Sclerosis Mother    • Diabetes Father    • Alcohol abuse Father   "                Medications:  Current Facility-Administered Medications   Medication Dose   • clotrimazole (LOTRIMIN) 1 % vaginal cream 1 Applicator  1 Applicator   • nystatin (MYCOSTATIN) powder     • diphenhydrAMINE (BENADRYL) injection 50 mg  50 mg   • acetaminophen (TYLENOL) tablet 500 mg  500 mg   • divalproex (DEPAKOTE SPRINKLE) capsule 500 mg  500 mg   • DULoxetine (CYMBALTA) capsule 60 mg  60 mg   • gabapentin (NEURONTIN) capsule 300 mg  300 mg   • methadone (DOLOPHINE) tablet 30 mg  30 mg   • PARoxetine (PAXIL) tablet 10 mg  10 mg   • risperiDONE (RISPERDAL) tablet 2 mg  2 mg   • senna-docusate (PERICOLACE or SENOKOT S) 8.6-50 MG per tablet 2 tablet  2 tablet    And   • polyethylene glycol/lytes (MIRALAX) PACKET 1 Packet  1 Packet    And   • magnesium hydroxide (MILK OF MAGNESIA) suspension 30 mL  30 mL    And   • bisacodyl (DULCOLAX) suppository 10 mg  10 mg   • acetaminophen (Tylenol) tablet 650 mg  650 mg   • diazePAM (VALIUM) tablet 5 mg  5 mg   • oxyCODONE immediate-release (ROXICODONE) tablet 5 mg  5 mg   • ceFAZolin in dextrose (ANCEF) IVPB premix 2 g  2 g   • enoxaparin (LOVENOX) inj 40 mg  40 mg   • Respiratory Therapy Consult     • ondansetron (ZOFRAN) syringe/vial injection 4 mg  4 mg   • labetalol (NORMODYNE/TRANDATE) injection 10 mg  10 mg   • ipratropium-albuterol (DUONEB) nebulizer solution  3 mL       Allergies:  Allergies   Allergen Reactions   • Sumatriptan Anaphylaxis     Historical=\"Heart stops.\" Reaction  between 1995 to 1997   • Prochlorperazine Rash and Swelling     Tongue swelling. Reaction as a teen. (compazine)  Tolerated Phenergan on 02/24/15   • Vancomycin Shortness of Breath and Rash     Reaction in 2005.  D/W patient 8/31/15 - tolerated loading dose of vancomycin administered on 8/30/15 with some itching to chest with decreased infusion rate. Red Man Syndrome.  2018-tolerated slow drip with benadryl pre-med-had no problems.         Vitals: BP (!) 99/55   Pulse 90   Temp 36.2 " "°C (97.2 °F) (Temporal)   Resp 16   Ht 1.6 m (5' 3\")   Wt 61.3 kg (135 lb 2.3 oz)   SpO2 95%           Labs: Reviewed and significant for 6/8: Na 133, Cr 0.25  Recent Labs     06/06/21  0400   RBC 3.40*   HEMOGLOBIN 9.5*   HEMATOCRIT 30.8*   PLATELETCT 290     Recent Labs     06/06/21  0400 06/07/21  0415 06/08/21  0530   SODIUM 134* 131* 133*   POTASSIUM 3.5* 3.8 3.9   CHLORIDE 97 97 96   CO2 29 28 30   GLUCOSE 66 75 80   BUN 6* 4* 4*   CREATININE 0.22* <0.17* 0.25*   CALCIUM 8.9 8.5 8.8     Recent Results (from the past 24 hour(s))   FUNGAL CULTURE    Collection Time: 06/07/21 12:48 PM    Specimen: Vaginal; Genital   Result Value Ref Range    Significant Indicator NEG     Source GEN     Site vaginal     Culture Result Culture in progress.    WET PREP    Collection Time: 06/07/21  1:16 PM    Specimen: Genital   Result Value Ref Range    Significant Indicator NEG     Source GEN     Site -     Wet Prep For Parasites -    Basic Metabolic Panel    Collection Time: 06/08/21  5:30 AM   Result Value Ref Range    Sodium 133 (L) 135 - 145 mmol/L    Potassium 3.9 3.6 - 5.5 mmol/L    Chloride 96 96 - 112 mmol/L    Co2 30 20 - 33 mmol/L    Glucose 80 65 - 99 mg/dL    Bun 4 (L) 8 - 22 mg/dL    Creatinine 0.25 (L) 0.50 - 1.40 mg/dL    Calcium 8.8 8.5 - 10.5 mg/dL    Anion Gap 7.0 7.0 - 16.0   ESTIMATED GFR    Collection Time: 06/08/21  5:30 AM   Result Value Ref Range    GFR If African American >60 >60 mL/min/1.73 m 2    GFR If Non African American >60 >60 mL/min/1.73 m 2           Imaging:  CT-CHEST,ABDOMEN,PELVIS WITH  Result Date: 5/10/2021  5/10/2021 2:35 PM HISTORY/REASON FOR EXAM:  Sepsis; follow up right side empyema and trapped lung, MSSA bacteremia. TECHNIQUE/EXAM DESCRIPTION: CT scan of the chest, abdomen and pelvis with contrast. Thin-section helical scanning was obtained with intravenous contrast from the lung apices through the pubic symphysis to include the chest, abdomen and pelvis. 100 mL of Omnipaque 350 " nonionic contrast was administered intravenously without complication. Low dose optimization technique was utilized for this CT exam including automated exposure control and adjustment of the mA and/or kV according to patient size. COMPARISON: CT chest 4/27/2021 FINDINGS: CT Chest: Tracheostomy tube in place. Large RIGHT empyema again seen with 2 chest tubes present.  Empyema slightly overall decreased in size.  Gas now present. Central venous catheter in place. Cavitary lesions are again present in the LEFT upper lobe, slightly smaller than prior exam. Feeding tube in place. CT Abdomen: Liver shows intrahepatic and extrahepatic biliary dilation. The spleen is unremarkable. Adrenal glands are unremarkable. Gallbladder is absent. LEFT kidney shows cortical cyst. RIGHT kidney is unremarkable. The pancreas is unremarkable. CT Pelvis: Bladder is unremarkable. Moderate pelvic fluid. Uterus is grossly unremarkable. Ovaries are not visualized. Appendix is not visualized. No pneumoperitoneum. Abdominal aorta shows minimal atherosclerotic calcification. No major bony abnormality is seen. RIGHT sided  shunt catheter present. Spinal stimulator present.     1.  Large empyema in the RIGHT hemithorax, slightly decreased in size from prior exam with chest tubes present. 2.  Consolidation remains. 3.  Multiple cavitary lesions again seen in the LEFT upper lobe, minimally decreased from prior, concerning for septic emboli. 4.  Supportive tubing is unchanged. 5.  Intrahepatic and extrahepatic biliary dilation, likely postoperative although distal stricture, stone or mass remain possibilities. 6.  Moderate pelvic fluid, nonspecific. 7.  No evidence of bowel obstruction or perforation.              ASSESSMENT:  Patient is a 50 y.o. female with known history of infected pain port, admitted for complications including endocarditis, empyema, septic emboli, bacteremia, and pneumonia pneumonia.       #Rehab:   -Vitals: intermittently  hypotensive, 1-2L NC, trach capped   -Insurance: [employer] Medicare   -Discharge support: spouse/mother may be able to assist   -Rehab Impairment Code: 0002.1 - Brain Dysfunction: Non-Traumatic (encephalopathy)  -Reason for admission: Empyema of right pleural space (HCC)   -Given her current functional level (supervised for PT and OT, except for min A for grooming), agree with LTACH when medically cleared      #Neuro: pseudotumor cerebri s/p  shunt; EEG 4/23 without seizures captures; Neurosurgery recommended keeping the  shunt at this time. Encephalopathy improving   -Depakote 500 mg Q8H  -Of note, has SCS that is not compatible with MRI     #Pulm: Right empyema s/p right chest tube on 4/17; Loculated right hydropneumothorax with empyema s/p right thoracoscopy with evacuation of old clots and decortication on 4/28; Acute respiratory failure with complicated pneumonia requiring continued ventilator s/p percutaneous trach on 5/1, eventually weaned off ventilator. Right empyema s/p thoracotomy and decortication on 5/17.  -Trach capped    #CV: Cardiac cath 4/23 with no evidence of left atrial appendage thrombus, did show large vegetations on tricuspid and mitral valve  -antibiotics     #Hyponatremia: Na 133 on 6/8    #Pain: Intermittent oxycodone and Norco (last time 7/2020); SCS   -Tylenol, duloxetine, gabapentin, methadone 30 mg daily (new prescription), PRN oxycodone    #ID: pneumonia, MSSA bacteremia, endocarditis, septic emoboli  -IV ancef (end date 6/14)    #Mood: Valium QHS PRN, duloxetine 60 mg BID, paxil, rissperdal     #Itching: PRN Benadryl     #Hyponatremia: Na 131 on 6/7    #Anemia    #Bowel: 3x on 6/7, senna s    #Bladder: external cath, voiding      #DVT PPX: lovenox           Thank you for allowing us to participate in the care of this patient.             Benoit Maxwell MD  Physical Medicine and Rehabilitation   6/8/2021

## 2021-06-08 NOTE — CARE PLAN
The patient is Stable - Low risk of patient condition declining or worsening    Shift Goals  Clinical Goals: Pain will be controlled with pharmacological and non-pharmacological methods  Patient Goals: Tolerate capping trial  Family Goals: NA    Progress made toward(s) clinical / shift goals:  Oxycodone on MAR to help manage patient's pain. Patient also re-positioned throughout shift to help with pain. Patient is tolerating capping trial. Patient has not had any desatting events.

## 2021-06-08 NOTE — DISCHARGE PLANNING
Medical Social Work    Anticipated Discharge Disposition: Skilled Nursing Facility    Action: Pt discussed in IDT rounds.  Pt continues to await insurance authorization for LANE and it may take up to 72 hours. LANE states that the pt is more appropriate for SNF and it is likely the pt will be denied the appeal for insurance auth for LANE.    LSW provided update in rounds and LSW to send SNF referrals. LSW met w/ pt at bedside to discuss SNF choice and pt agreeable to SNF.  Pt provided verbal authorization on SNF form for 1st choice - [facility], 2nd choice - [facility], and 3rd choice - [facility].      Barriers to Discharge: Post-acute placement needed.     Plan: Awaiting SNF placement.

## 2021-06-08 NOTE — CARE PLAN
The patient is Watcher - Medium risk of patient condition declining or worsening    Shift Goals  Clinical Goals: Maintain O2 sats above 90  Patient Goals:  Family Goals:    Progress made toward(s) clinical / shift goals:  O2 sats have remained above 90% during shift. Pt is on 2L O2, NC    Patient is not progressing towards the following goals:

## 2021-06-09 PROCEDURE — 700111 HCHG RX REV CODE 636 W/ 250 OVERRIDE (IP): Performed by: STUDENT IN AN ORGANIZED HEALTH CARE EDUCATION/TRAINING PROGRAM

## 2021-06-09 PROCEDURE — A9270 NON-COVERED ITEM OR SERVICE: HCPCS | Performed by: STUDENT IN AN ORGANIZED HEALTH CARE EDUCATION/TRAINING PROGRAM

## 2021-06-09 PROCEDURE — 99232 SBSQ HOSP IP/OBS MODERATE 35: CPT | Performed by: HOSPITALIST

## 2021-06-09 PROCEDURE — 97535 SELF CARE MNGMENT TRAINING: CPT

## 2021-06-09 PROCEDURE — 770006 HCHG ROOM/CARE - MED/SURG/GYN SEMI*

## 2021-06-09 PROCEDURE — 92526 ORAL FUNCTION THERAPY: CPT

## 2021-06-09 PROCEDURE — A9270 NON-COVERED ITEM OR SERVICE: HCPCS | Performed by: HOSPITALIST

## 2021-06-09 PROCEDURE — 700102 HCHG RX REV CODE 250 W/ 637 OVERRIDE(OP): Performed by: HOSPITALIST

## 2021-06-09 PROCEDURE — 700111 HCHG RX REV CODE 636 W/ 250 OVERRIDE (IP): Performed by: INTERNAL MEDICINE

## 2021-06-09 PROCEDURE — 700102 HCHG RX REV CODE 250 W/ 637 OVERRIDE(OP): Performed by: STUDENT IN AN ORGANIZED HEALTH CARE EDUCATION/TRAINING PROGRAM

## 2021-06-09 RX ADMIN — ENOXAPARIN SODIUM 40 MG: 40 INJECTION SUBCUTANEOUS at 06:04

## 2021-06-09 RX ADMIN — NYSTATIN: 100000 POWDER TOPICAL at 17:04

## 2021-06-09 RX ADMIN — ACETAMINOPHEN 500 MG: 325 TABLET ORAL at 08:59

## 2021-06-09 RX ADMIN — NYSTATIN: 100000 POWDER TOPICAL at 12:42

## 2021-06-09 RX ADMIN — RISPERIDONE 2 MG: 2 TABLET ORAL at 16:57

## 2021-06-09 RX ADMIN — DIVALPROEX SODIUM 500 MG: 125 CAPSULE ORAL at 14:46

## 2021-06-09 RX ADMIN — OXYCODONE 5 MG: 5 TABLET ORAL at 12:42

## 2021-06-09 RX ADMIN — OXYCODONE 5 MG: 5 TABLET ORAL at 06:46

## 2021-06-09 RX ADMIN — ACETAMINOPHEN 500 MG: 325 TABLET ORAL at 14:46

## 2021-06-09 RX ADMIN — GABAPENTIN 300 MG: 300 CAPSULE ORAL at 06:02

## 2021-06-09 RX ADMIN — OXYCODONE 5 MG: 5 TABLET ORAL at 20:29

## 2021-06-09 RX ADMIN — DIVALPROEX SODIUM 500 MG: 125 CAPSULE ORAL at 20:29

## 2021-06-09 RX ADMIN — CEFAZOLIN SODIUM 2 G: 2 INJECTION, SOLUTION INTRAVENOUS at 05:57

## 2021-06-09 RX ADMIN — CLOTRIMAZOLE 1 APPLICATOR: 1 CREAM VAGINAL at 20:29

## 2021-06-09 RX ADMIN — DULOXETINE HYDROCHLORIDE 60 MG: 60 CAPSULE, DELAYED RELEASE ORAL at 16:57

## 2021-06-09 RX ADMIN — DIPHENHYDRAMINE HYDROCHLORIDE 50 MG: 50 INJECTION INTRAMUSCULAR; INTRAVENOUS at 12:42

## 2021-06-09 RX ADMIN — CEFAZOLIN SODIUM 2 G: 2 INJECTION, SOLUTION INTRAVENOUS at 14:46

## 2021-06-09 RX ADMIN — DIPHENHYDRAMINE HYDROCHLORIDE 50 MG: 50 INJECTION INTRAMUSCULAR; INTRAVENOUS at 06:46

## 2021-06-09 RX ADMIN — GABAPENTIN 300 MG: 300 CAPSULE ORAL at 14:47

## 2021-06-09 RX ADMIN — CEFAZOLIN SODIUM 2 G: 2 INJECTION, SOLUTION INTRAVENOUS at 21:56

## 2021-06-09 RX ADMIN — OXYCODONE 5 MG: 5 TABLET ORAL at 00:31

## 2021-06-09 RX ADMIN — DIPHENHYDRAMINE HYDROCHLORIDE 50 MG: 50 INJECTION INTRAMUSCULAR; INTRAVENOUS at 00:31

## 2021-06-09 RX ADMIN — DULOXETINE HYDROCHLORIDE 60 MG: 60 CAPSULE, DELAYED RELEASE ORAL at 06:02

## 2021-06-09 RX ADMIN — ACETAMINOPHEN 500 MG: 325 TABLET ORAL at 20:29

## 2021-06-09 RX ADMIN — NYSTATIN: 100000 POWDER TOPICAL at 06:15

## 2021-06-09 RX ADMIN — GABAPENTIN 300 MG: 300 CAPSULE ORAL at 20:29

## 2021-06-09 RX ADMIN — DOCUSATE SODIUM 50 MG AND SENNOSIDES 8.6 MG 2 TABLET: 8.6; 5 TABLET, FILM COATED ORAL at 06:02

## 2021-06-09 RX ADMIN — DIPHENHYDRAMINE HYDROCHLORIDE 50 MG: 50 INJECTION INTRAMUSCULAR; INTRAVENOUS at 18:45

## 2021-06-09 RX ADMIN — METHADONE HYDROCHLORIDE 30 MG: 10 TABLET ORAL at 06:02

## 2021-06-09 RX ADMIN — PAROXETINE HYDROCHLORIDE 10 MG: 10 TABLET, FILM COATED ORAL at 06:02

## 2021-06-09 RX ADMIN — RISPERIDONE 2 MG: 2 TABLET ORAL at 06:02

## 2021-06-09 RX ADMIN — DIVALPROEX SODIUM 500 MG: 125 CAPSULE ORAL at 06:02

## 2021-06-09 ASSESSMENT — ENCOUNTER SYMPTOMS
CHILLS: 0
ABDOMINAL PAIN: 0
SORE THROAT: 0
DOUBLE VISION: 0
DIZZINESS: 0
PALPITATIONS: 0
SHORTNESS OF BREATH: 1
CONSTIPATION: 0
VOMITING: 0
DIARRHEA: 0
BACK PAIN: 0
COUGH: 0
SHORTNESS OF BREATH: 0
LOSS OF CONSCIOUSNESS: 0
NAUSEA: 0
FEVER: 0
BLURRED VISION: 0
HEADACHES: 0

## 2021-06-09 ASSESSMENT — PAIN DESCRIPTION - PAIN TYPE
TYPE: ACUTE PAIN

## 2021-06-09 ASSESSMENT — COGNITIVE AND FUNCTIONAL STATUS - GENERAL
TOILETING: A LITTLE
EATING MEALS: A LITTLE
DRESSING REGULAR UPPER BODY CLOTHING: A LITTLE
SUGGESTED CMS G CODE MODIFIER DAILY ACTIVITY: CK
PERSONAL GROOMING: A LITTLE
DRESSING REGULAR LOWER BODY CLOTHING: A LITTLE
HELP NEEDED FOR BATHING: A LITTLE
DAILY ACTIVITIY SCORE: 18

## 2021-06-09 NOTE — PROGRESS NOTES
Hospital Medicine Daily Progress Note    Date of Service  6/8/2021    Chief Complaint  50 y.o. female admitted 4/16/2021 with recurrent infections    Hospital Course     Per chart review: This is a 50-year-old female with pseudotumor cerebri s/p  shunt implantation by Dr. Oh neurosurgery, chronic pain syndrome with a history of placement of a nerve stimulator, vasculitis, right anterior chest port for home IV medication administration, recurrent infections related to port, presented on 4/11 to [facility] with recurrent port infection was initially treated with vancomycin and Zosyn, and then changed to ceftaroline subsequently switched to nafcillin, MSSA identified.  [facility] infectious disease is managing antibiotics for the patient.  Dr. Candelaria Surgery removed the port on 4/12.  The patient was further identified to have endocarditis of the tricuspid valve, a lumbar puncture performed on 4/14 showed pleocytosis with negative Gram stain.  The patient suffered worsening leukocytosis and was found to have septic emboli per CT.  Initially, a small pleural catheter was placed.  Significant loculations were encountered.  MSSA empyema was diagnosed.  Neurosurgery was consulted to comment on possibly removal of a  shunt - this was felt not appropriate at this time. The patient remained on mechanical ventilation for 15 days, then a percutaneous trach was placed.  The patient was eventually liberated from mechanical ventilation, the patient did have a right thoracoscopy and decortication of the lung performed on 4/28 by Dr. Ganser.  The patient was of identified to have multiple sources for MSSA including mitral valve, tricuspid valve vegetation, port, spinal stimulator,  shunt possibly.  Patient underwent repeat chest surgery, decortication with 2 chest tube placement on 5/18, had to be placed back on mechanical ventilation after surgery, central access was replaced on 5/19, the patient  treated in ICU with eventual chest tube removal with assistance of thoracic surgery and was deescalated in terms of respiratory support down to trach collar.    Pt was transferred to Hospitalist services and Telemetry on 5/27. Under hospitalist services, cortrek removed (feeding started) and Pt was continued to wean from O2 as tolerated. Pt was continued to be followed by Nataly LEIGH. In need of extensive rehabilitation, plan for LTAC level of care on discharge discussed.    Interval Problem Update  Patient wakes up slowly but is conversant  We discussed that her trach has been capped, she is excited to have it removed  Does not feel particularly short of breath, coughing a little  Denies chills or sweats  Pain controlled      Consultants/Specialty  ID  Thoracic surgery  Pulmonary critical care    Code Status  Full Code    Disposition  Pending insurance authorization for LTAC     Review of Systems  Review of Systems   Constitutional: Negative for chills and malaise/fatigue.   Respiratory: Negative for cough, hemoptysis and sputum production.    Cardiovascular: Negative for chest pain, palpitations and orthopnea.   Gastrointestinal: Positive for nausea. Negative for vomiting.   Musculoskeletal: Positive for myalgias.   Skin: Positive for rash. Negative for itching.   Neurological: Negative for dizziness.   All other systems reviewed and are negative.    Physical Exam  Temp:  [36.2 °C (97.1 °F)-36.4 °C (97.5 °F)] 36.2 °C (97.1 °F)  Pulse:  [] 90  Resp:  [16-19] 18  BP: ()/(55-67) 98/58  SpO2:  [95 %-98 %] 98 %    Physical Exam  Vitals and nursing note reviewed.   Constitutional:       General: She is not in acute distress.     Appearance: She is well-developed. She is ill-appearing. She is not toxic-appearing or diaphoretic.   HENT:      Head: Normocephalic and atraumatic.      Right Ear: External ear normal.      Left Ear: External ear normal.   Eyes:      General: No scleral icterus.  Neck:      Vascular:  No JVD.   Cardiovascular:      Rate and Rhythm: Regular rhythm. Tachycardia present.   Pulmonary:      Effort: Pulmonary effort is normal. No respiratory distress.      Breath sounds: No stridor. Rales present.      Comments: Bibasilar crackles  Abdominal:      General: There is no distension.      Palpations: Abdomen is soft.      Tenderness: There is no abdominal tenderness.   Skin:     Coloration: Skin is pale.      Findings: Rash (pustular rash on buttocks and back improving) present.   Neurological:      Mental Status: She is alert and oriented to person, place, and time.      Motor: Weakness present.   Psychiatric:         Thought Content: Thought content normal.         Fluids    Intake/Output Summary (Last 24 hours) at 6/8/2021 1856  Last data filed at 6/8/2021 1200  Gross per 24 hour   Intake 840 ml   Output --   Net 840 ml       Laboratory  Recent Labs     06/06/21  0400   WBC 4.5*   RBC 3.40*   HEMOGLOBIN 9.5*   HEMATOCRIT 30.8*   MCV 90.6   MCH 27.9   MCHC 30.8*   RDW 52.2*   PLATELETCT 290   MPV 10.1     Recent Labs     06/06/21  0400 06/07/21  0415 06/08/21  0530   SODIUM 134* 131* 133*   POTASSIUM 3.5* 3.8 3.9   CHLORIDE 97 97 96   CO2 29 28 30   GLUCOSE 66 75 80   BUN 6* 4* 4*   CREATININE 0.22* <0.17* 0.25*   CALCIUM 8.9 8.5 8.8                   Imaging  DX-CHEST-PORTABLE (1 VIEW)   Final Result      1.  Interval removal of the right-sided chest tubes. No pneumothorax is identified.   2.  Airspace opacities in the right are mildly improved and may represent atelectasis/consolidation.   3.  There is likely a small right pleural effusion.   4.  Patchy opacities on the left are mildly improved.   5.  Interval removal of the enteric tube.      DX-CHEST-PORTABLE (1 VIEW)   Final Result         1. Stable lines and tubes.   2. Stable ill-defined bilateral pulmonary opacities, right worse than left. No large pleural effusions.         DX-CHEST-PORTABLE (1 VIEW)   Final Result      1.  Decrease in volume of  right pleural fluid collection/empyema with 2 right chest tubes in place. No pneumothorax identified.      2.  No focal consolidation in the left lung.      DX-CHEST-PORTABLE (1 VIEW)   Final Result      No significant interval change.      DX-CHEST-PORTABLE (1 VIEW)   Final Result         1.  Hazy right pulmonary infiltrates and/or effusion, stable compared to prior study.      DX-CHEST-PORTABLE (1 VIEW)   Final Result         1.  Hazy right pulmonary infiltrates and/or effusion, stable compared to prior study.      DX-CHEST-PORTABLE (1 VIEW)   Final Result      1.  All lines and tubes appear appropriately located      2.  Consolidation throughout the right lung consistent with pneumonia      DX-CHEST-PORTABLE (1 VIEW)   Final Result         1.  Hazy right pulmonary infiltrates and/or effusion, increased compared to prior study.      DX-ABDOMEN FOR TUBE PLACEMENT   Final Result      Feeding tube looped in the stomach.      DX-CHEST-PORTABLE (1 VIEW)   Final Result         1. Stable lines and tubes. No pneumothorax detected.   2. Persistent opacities in the right perihilar region and in the right lung periphery, similar to prior study. Left lung is essentially clear.      DX-CHEST-PORTABLE (1 VIEW)   Final Result      Postoperative changes of the right chest with placement of an additional chest tube. There is improved aeration in the right lung. No significant pleural effusion or definite pneumothorax.      Right IJ central venous catheter tip projects over the proximal right atrium.      Hypoinflation with interstitial and hazy pulmonary opacities.      DX-CHEST-PORTABLE (1 VIEW)   Final Result         1.  Hazy right pulmonary infiltrates and/or effusion, similar compared to prior study.      DX-ABDOMEN FOR TUBE PLACEMENT   Final Result      Feeding tube tip projects over the expected location the gastric antrum.      DX-CHEST-PORTABLE (1 VIEW)   Final Result         1.  Hazy right pulmonary infiltrates and/or  effusion, similar compared to prior study.      DX-CHEST-LIMITED (1 VIEW)   Final Result      1.  No pneumothorax. Only one chest tube is now seen in the right lung.   2.  The chest is otherwise stable.      DX-CHEST-PORTABLE (1 VIEW)   Final Result         1.  Hazy right pulmonary infiltrates and/or effusion, similar compared to prior study.      DX-CHEST-PORTABLE (1 VIEW)   Final Result         1.  Hazy right pulmonary infiltrates and/or effusion, similar compared to prior study.      DX-ABDOMEN FOR TUBE PLACEMENT   Final Result         1.  Nonspecific bowel gas pattern.   2.  Dobbhoff tube tip terminates overlying the expected location of the gastric antrum.   3.  Hazy bilateral lung base infiltrates, greater on the right.      DX-CHEST-PORTABLE (1 VIEW)   Final Result         1.  Hazy right pulmonary infiltrates and/or effusion, similar compared to prior study.      US-RUQ   Final Result         1.  Hepatomegaly   2.  Mild intrahepatic and extrahepatic biliary ductal dilatation, commonly associated with postcholecystectomy physiology, consider causes of biliary obstruction with additional workup as clinically appropriate      CT-CHEST,ABDOMEN,PELVIS WITH   Final Result      1.  Large empyema in the RIGHT hemithorax, slightly decreased in size from prior exam with chest tubes present.   2.  Consolidation remains.   3.  Multiple cavitary lesions again seen in the LEFT upper lobe, minimally decreased from prior, concerning for septic emboli.   4.  Supportive tubing is unchanged.   5.  Intrahepatic and extrahepatic biliary dilation, likely postoperative although distal stricture, stone or mass remain possibilities.   6.  Moderate pelvic fluid, nonspecific.   7.  No evidence of bowel obstruction or perforation.      DX-CHEST-PORTABLE (1 VIEW)   Final Result         1.  Hazy right pulmonary infiltrates and/or effusion, similar compared to prior study.      DX-CHEST-PORTABLE (1 VIEW)   Final Result         No  significant interval change.            DX-CHEST-PORTABLE (1 VIEW)   Final Result         Interval removal of left central venous catheter. Interval adjustment of right PICC with tip in the SVC.         DX-ABDOMEN FOR TUBE PLACEMENT   Final Result      Feeding tube tip at the mid to distal stomach.      IR-PICC LINE PLACEMENT W/ GUIDANCE > AGE 5   Final Result                  Ultrasound-guided PICC placement performed by qualified nursing staff as    above.          DX-CHEST-PORTABLE (1 VIEW)   Final Result      1.  Stable cardiopulmonary findings.   2.  See comments above regarding the right-sided PICC position.      DX-CHEST-PORTABLE (1 VIEW)   Final Result      Stable chest findings compared to 5/5.      DX-CHEST-PORTABLE (1 VIEW)   Final Result      Stable chest findings compared with 5/3.      DX-ABDOMEN FOR TUBE PLACEMENT   Final Result         Feeding tube with tip projecting over the expected area of the stomach.      DX-CHEST-PORTABLE (1 VIEW)   Final Result      Stable chest findings compared with prior.      DX-CHEST-PORTABLE (1 VIEW)   Final Result         1. No significant interval change.      US-EXTREMITY ARTERY LOWER UNILAT RIGHT   Final Result      DX-CHEST-PORTABLE (1 VIEW)   Final Result         1. No significant interval change.      IR-PICC LINE PLACEMENT W/ GUIDANCE > AGE 5   Final Result                  Ultrasound-guided PICC placement performed by qualified nursing staff as    above.          DX-CHEST-PORTABLE (1 VIEW)   Final Result         1.  Pulmonary edema and/or infiltrates, similar to prior study.   2.  Stable opacification right lung, likely effusion. Thoracostomy tubes are in place.   3.  Multiple cavitary appearing left pulmonary lesions, see CT performed April 27, 2021 for further characterization      DX-CHEST-PORTABLE (1 VIEW)   Final Result         1.  Pulmonary edema and/or infiltrates, similar to prior study.   2.  Single opacification right lung, likely effusion.  Thoracostomy tubes are in place.   3.  Multiple cavitary appearing left pulmonary lesions, see CT performed April 27, 2021 for further characterization   4.  Soft tissue gas in the right chest wall      DX-CHEST-PORTABLE (1 VIEW)   Final Result         1.  Pulmonary edema and/or infiltrates, similar to prior study.   2.  Increased opacification right lung, likely increased effusion. Thoracostomy tubes are in place.   3.  Multiple cavitary appearing left pulmonary lesions, see CT performed April 27, 2021 for further characterization   4.  Soft tissue gas in the right chest wall      DX-CHEST-PORTABLE (1 VIEW)   Final Result         1.  Pulmonary edema and/or infiltrates, similar to prior study.   2.  Right pneumothorax, stable compared to prior study, right thoracostomy tubes remain in place   3.  Multiple cavitary appearing left pulmonary lesions, see CT performed April 27, 2021 for further characterization   4.  Soft tissue gas in the right chest wall      DX-CHEST-PORTABLE (1 VIEW)   Final Result         1.  Pulmonary edema and/or infiltrates, similar to prior study.   2.  Right pneumothorax, interval decreased compared to prior study, interval placement of right thoracostomy tubes is noted.   3.  Multiple cavitary appearing left pulmonary lesions, see CT performed yesterday for further characterization   4.  Soft tissue gas in the right chest wall      DX-CHEST-PORTABLE (1 VIEW)   Final Result         1.  Pulmonary edema and/or infiltrates, similar to prior study.   2.  Right pneumothorax, stable compared to prior study, interval removal of right thoracostomy tube is noted.   3.  Multiple cavitary appearing left pulmonary lesions, see CT performed yesterday for further characterization   4.  Soft tissue gas in the right chest wall      DX-CHEST-LIMITED (1 VIEW)   Final Result         No significant interval change.      CT-CTA CHEST PULMONARY ARTERY W/ RECONS   Final Result         No CT evidence of pulmonary  embolus.      Previous right chest tube were removed.      Grossly similar in size of the loculated right hydropneumothorax but there is increased layering fluid component. Unchanged mild shift of the mediastinal to the left.      Redemonstration of a pigtail catheter in the medial left lung base. Grossly similar multiple cavitary lesions in the left lung.      US-EXTREMITY VENOUS LOWER BILAT   Final Result      POCT BUTTERFLY-DUPLEX SCAN EXTREMITY VEINS LIMITED   Final Result      DX-CHEST-PORTABLE (1 VIEW)   Final Result         1.  Pulmonary edema and/or infiltrates, similar to prior study.   2.  Right pneumothorax, somewhat decreased to prior study, interval removal of right thoracostomy tube is noted.   3.  Multiple cavitary appearing left pulmonary lesions, see CT performed yesterday for further characterization   4.  Soft tissue gas in the right chest wall      DX-CHEST-PORTABLE (1 VIEW)   Final Result         1.  Pulmonary edema and/or infiltrates, similar to prior study.   2.  Right pneumothorax, similar to prior study with thoracostomy tube in place.   3.  Multiple cavitary appearing left pulmonary lesions, see CT performed yesterday for further characterization      CT-CHEST (THORAX) W/O   Final Result      Large loculated right hydropneumothorax with interval increase in size of the pneumothorax and pleural fluid.      Right chest tube is in the posterior right thorax.      There is mediastinal shift to the left compatible with tension.      Small pericardial effusion.      Small loculated left pleural effusion with overlying atelectasis/consolidation.   Pigtail catheter is seen at the medial left lung base. Pleural effusion has decreased in size compared to prior.      There are multiple foci of air extending from the right lung to the pleural space suspicious for a bronchopleural fistula.      Multiple cavitary lesions bilaterally with mild interval decrease of the solid component of the cavitary  nodules.      Noncavitary 3.1 cm masslike density is seen at the left lung base.      Findings may be related to septic emboli, malignancy or multifocal abscesses. Short interval follow-up recommended.      Soft tissue air on the right is again seen.      Splenomegaly      Findings discussed with Leti Sun.      DX-CHEST-PORTABLE (1 VIEW)   Final Result      No significant change from prior exam.      CT-HEAD W/O   Final Result      1.  RIGHT frontal ventriculostomy catheter unchanged in position.   2.  Mild cortical atrophy.   3.  No intracranial hemorrhage.   4.  Apparent dural thickening overlying the clivus.  Consider further evaluation with contrast-enhanced MRI.      DX-CHEST-LIMITED (1 VIEW)   Final Result      Interval left basilar pigtail catheter with diminished atelectasis, pleural fluid      Stable right hydropneumothorax with thoracostomy tube in place, considerable soft tissue gas and partial lung collapse      IR-CHEST TUBE-EMPYEMA LEFT   Final Result      1.  CT GUIDED LEFT  10 [place] PIGTAIL CHEST TUBE PLACEMENT FOR POSSIBLE EMPYEMA YIELDING OLD BLOODY FLUID.      2. A CHEST RADIOGRAPH IS FORTHCOMING.      EC-MICAH W/O CONT   Final Result      US-ABDOMEN LTD (SOFT TISSUE)   Final Result      No fluid seen surrounding the electronic implanted spinal stimulator device.      DX-CHEST-LIMITED (1 VIEW)   Final Result      1.  Large right hydropneumothorax with right-sided chest tube in place, likely stable to slightly decreased from prior study.   2.  Small left pleural effusion. Mild improved aeration in the left lung.   3.  Bilateral pulmonary opacities which could be related to combination of atelectasis, edema and/or infection..      CT-CTA HEAD WITH & W/O-POST PROCESS   Final Result      1.  Patent carotid and vertebral arteries.      2.  Again seen right frontal the jugular peritoneal shunt catheter. There is mild increase in volume of the lateral and third ventricles.      CT-CHEST (THORAX) W/O    Final Result      1.  Interval placement of a right-sided chest tube into a large loculated right-sided pleural effusion. There is now seen a large right-sided hydropneumothorax in the area that previously seen fluid. The chest tube extends into that hydropneumothorax.    There is some residual pleural fluid seen as well. A hydropneumothorax is somewhat loculated with components most prominently inferiorly and medially.      2.  New loculated left pleural effusion.      3.  Again seen multiple bilateral cavitary pulmonary masses which are more prominent than previously seen with increasing cavitation and measure up to 3 cm size. Differential diagnosis includes septic emboli, multifocal abscesses and neoplasm.      4.  Large amount of subcutaneous emphysema on the right.      5.  Splenomegaly.      6.  Multiple support devices.      Fleischner Society pulmonary nodule recommendations:         DX-CHEST-LIMITED (1 VIEW)   Final Result      1.  Support devices as described above.      2.  Right chest tube present with a again seen right-sided pneumothorax, possibly increased in size and loculated.      3.  Worsening bilateral pulmonary infiltrates.      DX-ABDOMEN FOR TUBE PLACEMENT   Final Result      Cortrak feeding tube tip projects in the region of the duodenal bulb.      DX-CHEST-LIMITED (1 VIEW)   Final Result      Loculated right pneumothorax is decreased in size compared to prior.      Small left pleural effusion.      Small loculated right pleural effusion.      Extensive bilateral airspace opacities are not significantly changed.      Soft tissue air on the right is again noted.      DX-CHEST-LIMITED (1 VIEW)   Final Result      Decreased partially loculated right pleural fluid with increased pleural air. Right chest tube is in place.      Increased hazy opacity in the left lung possibly atelectasis.         US-ABDOMEN LTD (SOFT TISSUE)   Final Result      No drainable fluid collection.       DX-CHEST-PORTABLE (1 VIEW)   Final Result      Decreased partially loculated right pleural fluid with increased pleural air. Right chest tube is in place.      No other significant change.      DX-CHEST-PORTABLE (1 VIEW)   Final Result         1.  Pulmonary edema and/or infiltrates are identified, which are somewhat decreased since the prior exam.   2.  Moderate loculated appearing right pleural effusion, slightly decreased since prior      DX-ABDOMEN FOR TUBE PLACEMENT   Final Result      Feeding tube tip at the distal stomach.      DX-CHEST-PORTABLE (1 VIEW)   Final Result         1.  New chest tube is noted in the right lower hemithorax.      2.  Right pleural effusion is again identified which appears similar to the prior exam.      3.  No new infiltrates or consolidations are identified.      DX-CHEST-PORTABLE (1 VIEW)   Final Result         1.  Pulmonary edema and/or infiltrates are identified, which are stable since the prior exam.   2.  Moderate loculated appearing right pleural effusion      DX-CHEST-PORTABLE (1 VIEW)   Final Result         No significant interval change.      POCT BUTTERFLY-ECHOCARDIOGRAM LTD W/O CONT    (Results Pending)        Assessment/Plan  * Acute bacterial endocarditis- (present on admission)  Assessment & Plan  Mitral valve, tricuspid valve  MSSA  Continue Ancef through 6/14/2021    Empyema of right pleural space (HCC)- (present on admission)  Assessment & Plan  Patient s/p multiple chest tubes, thoracotomy and decortication  Now s/p second decortication 5/17  Per thoracic surgery note, sutures to be removed on 6/9      Acute respiratory failure with hypoxia (HCC)- (present on admission)  Assessment & Plan  Secondary to MSSA bacteremia, empyema, pulmonary septic emboli  S/p tracheostomy, trach is been capped for more than 24 hours, I discussed with respiratory therapy and evaluated the patient, we agreed decannulation is appropriate  Postacute planning    Acute encephalopathy-  (present on admission)  Assessment & Plan  Per neurosurgery no need to remove  shunt  Improved      Vaginal itching  Assessment & Plan  Topical Chlortrimazole    Debility  Assessment & Plan  Acute on chronic, the patient likely need of LTAC treatment post acute treatment    Elevated alkaline phosphatase level- (present on admission)  Assessment & Plan  The patient not able to tolerate a MRCP at this time, ultrasound grossly unremarkable, observe    Goals of care, counseling/discussion  Assessment & Plan  Patient with overall high risk of morbidity and mortality given her gravely ill state and prior chronic medical conditions  Palliative care following    Spinal cord stimulator status- (present on admission)  Assessment & Plan  Patient's stimulator is [employer]. It is FDA approved as MRI compatible, but the company has gone out of business, so there is no support team to assist with MRI.  Thus, if MRI were performed, our radiologists would need to protocol it correctly to manage the stimulator. The former rep from [employer] is Andre, [phone].  Information obtained from Dr. Mahoney's office, [phone].     Per  (5/4/2021), it is not MRI compatible unfortunately.     Thrombocytosis (HCC)  Assessment & Plan  Acute reaction since resolved    Anasarca- (present on admission)  Assessment & Plan  Improved    Trapped lung  Assessment & Plan  Now s/p decortication   Chest tubes removed on 5/26, observe    Pulmonary abscess (HCC)  Assessment & Plan  From septic emboli  Now s/p decortication    Fever  Assessment & Plan  Resolved, monitor, antibiotic therapy    Pneumonia  Assessment & Plan  MSSA  Ancef per ID    History of vasculitis- (present on admission)  Assessment & Plan  Does not appear to be a current issue    Bacteremia due to methicillin susceptible Staphylococcus aureus (MSSA)- (present on admission)  Assessment & Plan  Ancef    CSF pleocytosis- (present on admission)  Assessment & Plan  Infectious disease  managing    Acute blood loss anemia  Assessment & Plan  Likely secondary to hemothorax  300 cc of blood removed after chest tube [facility]  Hb has been stable around 9-10    Prolonged Q-T interval on ECG- (present on admission)  Assessment & Plan  Resolved after correcting metabolic issues  Cont to follow   Monitor electrolytes, acidosis etc    History of complicated migraines- (present on admission)  Assessment & Plan  Monitor    GERD (gastroesophageal reflux disease)- (present on admission)  Assessment & Plan  History of, as needed treatment    Hyponatremia  Assessment & Plan  Mild - asymptomatic  Monitor    Tachycardia  Assessment & Plan  Persistent, likely secondary with multiple underlying ongoing conditions  Monitor  Stable    Pseudotumor cerebri  Assessment & Plan  History of  shunt, neurosurgery opted against removal    H/O: CVA (cerebrovascular accident)- (present on admission)  Assessment & Plan  Monitor    Chronic pain syndrome- (present on admission)  Assessment & Plan  Pre-existing, pain management    Port or reservoir infection  Assessment & Plan  S/p removal    Thrombocytopenia (HCC)- (present on admission)  Assessment & Plan  Transient, resolved    Anemia- (present on admission)  Assessment & Plan  Monitor, transfuse as indicated    Septic shock (HCC)- (present on admission)  Assessment & Plan  Sepsis is resolved    Sepsis (HCC)  Assessment & Plan  Sepsis has resolved    Hypomagnesemia  Assessment & Plan  Monitor, replace and recheck    Hypokalemia  Assessment & Plan  Monitor, replace and recheck    Anxiety- (present on admission)  Assessment & Plan  Continue Cymbalta and Paxil  As needed Valium    S/P  shunt- (present on admission)  Assessment & Plan  History of, Dr. Oh felt there is currently no need to remove    Folliculitis  Assessment & Plan  Improved         VTE prophylaxis: enoxaparin

## 2021-06-09 NOTE — PROGRESS NOTES
Assessment/description of ears? Pink, intact, blanching   Which preventative measures are in place for the ears? Foam padding on oxygen tubing, encourage pt to remove glasses when not is use    Assessment/description of elbows? Intact, pink, blanching  Which preventative measures are in place for the elbows? Encourage pt to get out of bed    Assessment/description of sacrum? Pink, red, peeling, rash noted  Which preventative measures are in place for the sacrum? Nystatin applied, pt encouraged to turn in bed    Assessment/description of heels? Dry, pink, blanching  Which preventative measures are in place for the heels? Encourage pt to get out of bed    Which devices are in place? PICC, glasses, nasal cannula  Description of skin under devices: CDI  Which preventative measures are in place under devices?    Other:  R great toe: pressure injury noted  L 3rd toe: pressure injury noted  Groin, sacrum, back: red, pink, peeling rash noted- nystatin applied  [place] dressing to R side (previous chest tube site)

## 2021-06-09 NOTE — PROGRESS NOTES
Received report from NOC RN and assumed care of pt. Pt is A&Ox4 resting in bed on 2L O2 via nasal cannula. Pt reports 6/10 head pain, pt educated on medication available. POC discussed with pt; all questions answered. No other needs at this time. Bed locked in lowest position, call light within reach, pt educated to call for help when getting out of bed; pt verbalized understanding.

## 2021-06-09 NOTE — PROGRESS NOTES
Infectious Disease Progress Note    Author: Brian Omer M.D. Date & Time created: 6/9/2021  2:25 PM     Cefazolin 5/18-19, 5/26-Day #14  Target 6/14/21     Thoracoscopy & Decortication - 4/28  Decortication 5/17     PRIOR Rx:   Zosyn 4/22-4/23  Previous: Unasyn, Zosyn, Vanco, Daptomycin  Ceftaroline: start 4/13-4/15  Nafcillin 2g Q4 hrs 4/15-4/23      Meropenem 5/19-5/26   Cefepime 4/23 5/18 4/14: Unable to give name of previous NSG or neurologist. Seems confused, but able to say some sentences fluently.   4/15: Line cultures now growing MSSA. As well as from the blood. Repeat blood culture 4/13+ in both sets. Patient has worsening confusion. CSF fluid analyses suggest pleocytosis. Cultures are no growth from the CSF. Chest CT was ordered this morning indicating a loculated right pleural effusion as well as bilateral septic emboli. Dr. Mack spoke with Dr. Oh yesterday and no surgical intervention unless clinical deterioration.  4/16: Increased respiratory distress.  Tachypneic.  CT with loculated collection.  Dr Resendiz not available.  No thoracic surgeon available.  Plans to have pulmonary place CT.  Transferring to ICU.  4/17: Transferred to [facility] last night. Hgb dropped to 6.4 requiring PRBC transfusion. Requiring 2 pressors. Fio2 50%. Febrile 38.8. Pending OR decortication of empyema and hemothorax. Chest tube placed at [facility] shows 8,371 WBC, ,000, 74% N  4/18: Chest tube was upsized by surgery yesterday, no decortication. WBC 22k. Fio2 40%. Vasopressin. Levophed down to 2mcg. Afebrile 24 hrs. Last fever 38.6 on 4/16.   4/19: Still on vent. Levo 3 mcg, vasopressin. Afebrile 72 hours. WBC 21k. BC 4/17 NGTD x 48 hours. Unable to do MRI brain given neurostimulator per RN.   4/20: Remaining afebrile. Hb 6.2 and undergoing transfusion today.WBC 24,100, 5% bands.1700 ml CT output. Copious bloody ET secretions. No pressors. Receiving dornase and TPA per CT. Right pleural effusion not large per CXR  today.   4/21: WBC 31k. Bronchoscopy yesterday showing blood. Cultures sent. TPA on hold per RN due to arvind blood from chest tube requiring PRBC transfusion for hgb 6. Pending chest CT today. Per , spinal stimulator is non-MRI compatible. Levophed 10mcg. 30% Fio2. Afebrile.   4/22: Fever 101.3 this am. WBC 20,300 down from 32,200 yesterday. BAL growing Klebsiella pneumoniae but susceptibility panel not set up until today. CTA of brain shows no lesion. CT of chest shows enlarging cavitary lung lesions bilat and large loculated new left pleural effusion and large hydropneumothorax on right. TPA & dornase infusions of right chest ended 4/20 after considerable bleeding requiring transfusion. Hb now down again to 6.8.  4/23: WBC 23k. Fio2 40%. Tmax 38.1. US of stiumlator shows small fluid collection but no drainable abscess. Pending MICAH today. Pending L chest tube placement  4/24: Continue fever 100.8-101.8. WBC 16,600. MICAH shows large vegetations on both tricuspid valve and mitral valve with mild TR & MR.  No aortic root abscess. Left chest tube placed yesterday yielding 8 ml of old bloody fluid. Bronch today revealed copious thick maroon secretions in distal trachea and both mainstem bronchi. On the right these were obstructive. Remaining off pressors. Last positive blood cultures (MSSA) were 4/16, negative 4/17.  4/25: Fever 100.6 this AM. wBC 13,300. Hb 6.6. CT: right hydropneumothorax increasing, right bronchopleural fistula, mediastinal shift ot the left , multiple cavitary pulmonary nodules, 3.1 cm left basilar mass, splenomegaly. CT of head shows dural thickening over the clivus. Lac+ GNR >100,000 col/ml growing from BAL of 4/24, presumed Klebsiella. Left peural fluid culture negative from 4/23.  4/26: Fever 100.4 last night. WBC 13,500. Hb 7.4. Plts 502,000. ESR >120. UA fairly clear except 30 mg% protein, 2-5 wbc and rare rbc. CXR unchanged except more prominent pulmonary edema. GNR in BAL may be a  different species of Klebsiella, and resistant to ampicillin & tmp-sulfa; confirmation pending.  4/28: Fever 100.8 last night. WBC 27,700. Hb 6.6. Plts 482,000. Creat 0.19. Kleb pneum in BAL on 4/24 is resistant to ampicillin & tmp-sulfa but otherwise sensitive. O2 sat 96% on FIO2 30% now, PEEP 8. Has been re-evaluated by thoracic surgeon, Dr. Ganser, who believes she will not wean without decortication on the right. Surgery anticipated today.  4/29: S/P right thoracoscopy with decortication with cultures NG at 24 hrs.  4/30: Had a bronch due to hypoxia and hypotension. CXR with worsening right hemithorax pulmonary opacities. Bloody mucous plugs removed. Pressors started. Febrile this am. Tachy in 130s. Pressors just removed. Tolerating vent, but not a SBT candidate at the moment.  5/1: Small air leak CT-2 every ~ 2/3 breathes. The K. pneumoniae from her thoracoscopy is sensitive to her cefepime so no antibiotic change needed.   5/2: No air leak today she tolerated 2  hrs of spontaneous breathing with support today.      5/3: Second day with SBT and doing well now for 2 hrs. Slight bump in temperature and      WBC's but no obvious clinical infectious changed so watch closely.      5/4: Fever still from time to time. WBCs trending up. Chest tubes in place. Trach.       5/5: She clearly is better today she was sitting up in bed with open eyes and follows commands. She still has low grade fever but her WBC's are dropping. Cefepime dose increased yesterday for increased CNS coverage if necessary. She will probably need further thoracic surgery as mentioned by Dr. Ganser. The issue of what to do with her stimulator is still up in the air for now as it probably will need to be removed but she is nort a candidate for more major surgery at this time.       5/7: She continues to improve and under go longer SBT trials. She just had a new PICC placed in her right arm and plan is to D/C central line.        5/8: PICC placed. No  fevers. Comfortable. Cortrak placed. Failing SBTs.  5/9: No acute issues. No fevers. Comfortable. Family at bedside.   5/10: No acute issues, fevers or WBC bumps. No changes in condition. She is to get her CT scan today and be reviewed by surgery  5/11:  at bedside.  Has been referred to LANE.  Repeat CT scan completed.  Results noted.  ? If Dr Ganser has seen.  Still with an empyema:  ? If candidate for further surgery.  5/12: Pending next thoracic surgery. No fever. Anxious.  5/13: ? Surgery date.  No one seems to know.  Had speaking valve in and having swallow eval with speech therapy  5/14: No acute issues. No fevers. Surgery Monday.  5/15: One CT accidentally pulled out yesterday.  Has been on T piece and tolerating. Plans for surgery Monday afternoon.  5/16: On schedule for surgery 5/17: 4:30 pm.  Moved to room T614.  No new issues.  5/17: Was sitting up in the bedside chair since change of shift.  Now walking in hallways with RN and CNA.  5/18: Decortication yesterday with 2 chest tube placement. WBC improving 21->14k  5/19: Hypotension and tachycardia.  Decreased H/H.  Placed back on vent to rest.  Dr Lozano in process of placing new line.  Antibiotics were deesculated yesterday to Cefazolin.  No cx were taken at surgery.  5/20: Pt was febrile yest afternoon 38.2, but now afebrile overnight after meropenem. WBC improved 11k->8k. Fio2 30%. Phenylephrine now off this morning. CXR shows new R consolidations.   5/21: Off pressors.  On vent: FiO2 30%, PEEP 8, PS 5 Received pRBCs yesterday.  5/22: Remains afebrile, off pressors. FiO2 30%.   5/23: Afebrile. FiO2 30%. Bronchoscopy yesterday normal.   5/24: No new cultures obtained at last bronchoscopy.  On  T piece this am: FiO2 30%.  5/25: Remaining afebrile. WBC 8600.   5/26: Chest tube removed today. Possible transfer to [facility].  5/27: O2 sat usuallyl 95% but intermittent desat to 87-89%.  Remaining afebrile.WBC 6100. Alk phos 641 but normal transaminases.    5/28: Pending [facility] transfer. NO new issues overnight  5/29: Awaiting bed at [facility]. Remaining afebrile. WBC 5800, creat 0.19.  5/31. Remaining afebrile. WBC 6000. . Remains weak and frail. Alk Phos 694 with normal transaminases. Slightly improved bilateral patchy infiltrates.   6/1: She is anxious to move on but still waiting for insurance approval to [facility].  6/2: Still awaiting bed at [facility]. Remaining afebrile. WBC 8300. Alk Phos 705 but transaminases normal and bilirubine 0.4.  6/4: Awaiting insurance auth before bed can be assigned at [facility]. Remaining afebrile. Extensive folliculitis over back and buttocks.  WBC 8300 on 6/2. Last  on 5/31.  6/5: Her ESR is down to 53 from 102 a good sign hopefully. She continues to work on her strength by walking.  6/6: Speech evaluation to check her swallow as she is stronger and can't stand pureed.  6/7: [facility] was denied by insurance. Could she go to full rehab now? Swallow eval pending.clotrimazole for vaginal drainage and itch.  6/8: Continued to be declined by insurances. No issues clinically. Physical Med declined for rehab. No fevers.  6/9: OT will recommend home PT/OT for her once he finishes her antibiotics.    Interval History:  Past 24 hrs reviewed with RN.    Labs Reviewed, Medications Reviewed, Radiology Reviewed, Wound Reviewed, Fluids Reviewed and GI Nutrition Reviewed    Review of Systems:  Review of Systems   Constitutional: Negative for chills and fever.   Respiratory: Negative for cough and shortness of breath.    Cardiovascular: Negative for chest pain.   Gastrointestinal: Negative for abdominal pain, constipation, diarrhea, nausea and vomiting.   Genitourinary: Negative for dysuria.   Skin: Positive for itching (by tail bone).       Physical Exam:  Physical Exam  Vitals and nursing note reviewed.   Constitutional:       General: She is not in acute distress.     Appearance: Normal appearance. She is not ill-appearing, toxic-appearing or diaphoretic.    HENT:      Head: Normocephalic and atraumatic.   Cardiovascular:      Rate and Rhythm: Normal rate and regular rhythm.      Heart sounds: No murmur heard.     Pulmonary:      Effort: Pulmonary effort is normal. No respiratory distress.      Breath sounds: No wheezing or rhonchi.      Comments: BS clear bilaterally but L > R still  Abdominal:      General: Abdomen is flat. There is no distension.      Palpations: Abdomen is soft.      Tenderness: There is no abdominal tenderness. There is no guarding.   Skin:     General: Skin is warm and dry.   Neurological:      Mental Status: She is alert and oriented to person, place, and time.   Psychiatric:         Behavior: Behavior normal.      Comments: Starting to smile more.         Labs:  No results found for this or any previous visit (from the past 24 hour(s)).    Hemodynamics:  Temp (24hrs), Av.4 °C (97.6 °F), Min:36.1 °C (97 °F), Max:36.8 °C (98.3 °F)  Temperature: 36.6 °C (97.9 °F)  Pulse  Av.1  Min: 40  Max: 220   Blood Pressure: (!) 93/55 (nurse aware)    PICC Double Lumen 21 Lumen 1: Purple Lumen 2: Red Right Brachial (Active)   Site Assessment Clean;Dry;Intact 21 08   Lumen 1 Status Scrubbed the hub prior to access;Flushed;Blood return noted 21 08   Lumen 2 Status Scrubbed the hub prior to access;Flushed;Blood return noted 21 08   Line Secured at (cm) 1 cm 21 1118   Extremity Circumference (cm) 30.5 cm 21 1118   Dressing Type Biopatch;Occlusive;Securing device;Transparent 21 08   Dressing Status Clean;Dry;Intact 21 08   Dressing Intervention N/A 21 08   Dressing Change Due 21 0800   Date Primary Tubing Changed 21 0000   Date Secondary Tubing Changed 21 0000   Date IV Connector(s) Changed 21 2100   NEXT Primary Tubing Change  21 0000   NEXT Secondary Tubing Change  21 0000   NEXT IV Connector(s)  Change 05/30/21 05/29/21 2100   Line Necessity Assessed Antibiotic Therapy Greater than 7 Days 06/06/21 2020   $ Double Lumen PICC Charge Single kit used 05/07/21 1118     Wound:  @WOUNDLDA(4)@     Fluids:  Intake/Output                             06/07/21 0700 - 06/08/21 0659 06/08/21 0700 - 06/09/21 0659 06/09/21 0700 - 06/10/21 0659     0700-1859 1900-0659 Total 0700-1859 1900-0659 Total 7849-8009 1699-3535 Total                    Intake    P.O.  815  120 935  670  120 790  480  -- 480    P.O. 815 120 935 670 120 790 480 -- 480    Other  --  -- --  50  -- 50  50  -- 50    Medications (PO/Enteral Liquids) -- -- -- 50 -- 50 50 -- 50    Total Intake 815 120 935 720 120 840 530 -- 530       Output    Urine  --  -- --  --  -- --  --  -- --    Number of Times Voided 1 x -- 1 x 4 x -- 4 x 1 x -- 1 x    Stool  --  -- --  --  -- --  --  -- --    Number of Times Stooled 3 x -- 3 x -- 1 x 1 x -- -- --    Total Output -- -- -- -- -- -- -- -- --       Net I/O     815 120 935 720 120 840 530 -- 530           GI/Nutrition:  Orders Placed This Encounter   Procedures   • Diet Order Diet: Level 5 - Minced and Moist; Liquid level: Level 2 - Mildly Thick; Tray Modifications (optional): SLP - Deliver to Nursing Station, SLP - 1:1 Supervision by Nursing, No Straws     Standing Status:   Standing     Number of Occurrences:   1     Order Specific Question:   Diet:     Answer:   Level 5 - Minced and Moist [24]     Order Specific Question:   Liquid level     Answer:   Level 2 - Mildly Thick     Order Specific Question:   Tray Modifications (optional)     Answer:   SLP - Deliver to Nursing Station     Order Specific Question:   Tray Modifications (optional)     Answer:   SLP - 1:1 Supervision by Nursing     Order Specific Question:   Tray Modifications (optional)     Answer:   No Straws     Medications:  Current Facility-Administered Medications   Medication Last Admin   • clotrimazole (LOTRIMIN) 1 % vaginal cream 1 Applicator 1  Applicator at 06/08/21 2111   • nystatin (MYCOSTATIN) powder Given at 06/09/21 1242   • diphenhydrAMINE (BENADRYL) injection 50 mg 50 mg at 06/09/21 1242   • acetaminophen (TYLENOL) tablet 500 mg 500 mg at 06/09/21 0859   • divalproex (DEPAKOTE SPRINKLE) capsule 500 mg 500 mg at 06/09/21 0602   • DULoxetine (CYMBALTA) capsule 60 mg 60 mg at 06/09/21 0602   • gabapentin (NEURONTIN) capsule 300 mg 300 mg at 06/09/21 0602   • methadone (DOLOPHINE) tablet 30 mg 30 mg at 06/09/21 0602   • PARoxetine (PAXIL) tablet 10 mg 10 mg at 06/09/21 0602   • risperiDONE (RISPERDAL) tablet 2 mg 2 mg at 06/09/21 0602   • senna-docusate (PERICOLACE or SENOKOT S) 8.6-50 MG per tablet 2 tablet 2 tablet at 06/09/21 0602    And   • polyethylene glycol/lytes (MIRALAX) PACKET 1 Packet      And   • magnesium hydroxide (MILK OF MAGNESIA) suspension 30 mL      And   • bisacodyl (DULCOLAX) suppository 10 mg     • acetaminophen (Tylenol) tablet 650 mg 650 mg at 05/30/21 0457   • diazePAM (VALIUM) tablet 5 mg 5 mg at 06/07/21 2103   • oxyCODONE immediate-release (ROXICODONE) tablet 5 mg 5 mg at 06/09/21 1242   • ceFAZolin in dextrose (ANCEF) IVPB premix 2 g Stopped at 06/09/21 0627   • enoxaparin (LOVENOX) inj 40 mg 40 mg at 06/09/21 0604   • Respiratory Therapy Consult     • ondansetron (ZOFRAN) syringe/vial injection 4 mg 4 mg at 06/02/21 0703   • labetalol (NORMODYNE/TRANDATE) injection 10 mg 10 mg at 05/04/21 0226   • ipratropium-albuterol (DUONEB) nebulizer solution       Medical Decision Making, by Problem:  Active Hospital Problems    Diagnosis    • *Acute bacterial endocarditis [I33.0]    • Vaginal itching [N89.8]    • Debility [R53.81]    • Elevated alkaline phosphatase level [R74.8]    • Agitation requiring sedation protocol [R45.1]    • Spinal cord stimulator status [Z96.89]    • Goals of care, counseling/discussion [Z71.89]    • Fever [R50.9]    • Pulmonary abscess (HCC) [J85.2]    • 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]    • Pleural effusion, left  [J90]    • Anasarca [R60.1]    • Thrombocytosis (HCC) [D47.3]    • Atelectasis [J98.11]    • Acute respiratory failure with hypoxia (HCC) [J96.01]    • Prolonged Q-T interval on ECG [R94.31]    • Empyema of right pleural space (HCC) [J86.9]    • CSF pleocytosis [D72.9]    • Bacteremia due to methicillin susceptible Staphylococcus aureus (MSSA) [R78.81, B95.61]    • History of vasculitis [Z86.79]    • Pneumonia [J18.9]    • History of complicated migraines [G43.109]    • GERD (gastroesophageal reflux disease) [K21.9]    • Hyponatremia [E87.1]    • Tachycardia [R00.0]    • Pseudotumor cerebri [G93.2]    • H/O: CVA (cerebrovascular accident) [Z86.73]    • Chronic pain syndrome [G89.4]    • Port or reservoir infection [T80.212A]    • Thrombocytopenia (HCC) [D69.6]    • Septic shock (HCC) [A41.9, R65.21]    • Anemia [D64.9]    • Sepsis (HCC) [A41.9]    • Hypokalemia [E87.6]    • Hypomagnesemia [E83.42]    • S/P  shunt [Z98.2]    • Anxiety [F41.9]    • Folliculitis [L73.9]    • Acute encephalopathy [G93.40]      Impression   -Severe sepsis  -Catheter related bloodstream infection due to infected port status post removal 4/12-staph aureus  -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       - Acute left empyema s/p chest tube placement 4/23, decortication 5/17, chest tube removal 5/26 - Klebsiella  -Multiple acute septic pulmonary emboli bilaterally  -Acute bilateral pneumonia due to above     --Pulmonary edema  -Pseudotumor cerebri with underlying  shunt: possible arachnoiditis  -Chronic migraine headaches  -History of autoimmune vasculitis  -Elevated alkaline phosphatase & bilirubin  -Acute encephalopathy due to sepsis      - Anemia due to above      - Acute fever on 5/19 likely from secondary VAP      - Secondary VAP R sided pneumonia      - decubitus ulcer sacral.      Plan   Continue cefazolin - 4  week target date 6/14/21 - started after decortication  Aggressive PT  Repeat CT of chest prior to stopping abx given complicated infiltrative dx.  Will need suppression after therapy for her HW to include spinal stimulator,  shunt          - Given MSSA - Keflex appropriate  No changes to therapy today  Dispo:          - Declined by LANE and Rehab     OT will recommend home PT/OT for her once he finishes her antibiotics Continue with antibiotics IV as planned 5 more days then to PO chronic suppression.  Speech evaluation advancing her diet.  Case and treatment reviewed with patient, , micro, Speech, OT and RN 30 min on floor with  > 50% face to face view and 100% in direct patient care/counseling today.

## 2021-06-09 NOTE — DISCHARGE PLANNING
Received Choice form at 0810  Agency/Facility Name: [facility]  Referral sent per Choice form @ 3150    THANH Pedroza notified via Teams.

## 2021-06-09 NOTE — DISCHARGE PLANNING
Medical Social Work    Anticipated Discharge Disposition: TBD    Action: Pt discussed in IDT rounds.  Pt trach removed yesterday and pt continues to improve.  MD requesting PT/OT re-evaluation. Pt is currently pending SNF placement and LSW will continue to follow for SNF acceptance.    Barriers to Discharge: SNF acceptance. PT/OT re evaluation.    Plan: LSW will continue to follow.

## 2021-06-09 NOTE — THERAPY
"Occupational Therapy  Daily Treatment     Patient Name: Enedelia Pang  Age:  50 y.o., Sex:  female  Medical Record #: [id number]  Today's Date: 6/9/2021     Precautions  Precautions: Fall Risk, Swallow Precautions ( See Comments)  Comments: trach removed    Assessment    Pt continues to make good progress.  present and helping pt as needed. Pt edu on compensatory strategies during ADLs as well as appropriate AE/DME to maximize independence and safety.      Plan    Continue current treatment plan.    DC Equipment Recommendations: 4-Wheeled Walker, Tub / Shower Seat, Bed Side Commode  Discharge Recommendations: Recommend home health for continued occupational therapy services    Subjective    \"I'd rather go home\"     Objective       06/09/21 1455   Cognition    Cognition / Consciousness WDL   Level of Consciousness Alert   Comments pleasant and receptive to education, able to carryover edu   Other Treatments   Other Treatments Provided Introduced to 4ww as pt's balance is fair and she has low endurance. Pt able to demo walker safety after given visual demo and verbal cues. Extensive discussion about ADLs at home and appropriate DME and where to obtain   Balance   Sitting Balance (Static) Fair +   Sitting Balance (Dynamic) Fair   Standing Balance (Static) Fair   Standing Balance (Dynamic) Fair   Weight Shift Sitting Fair   Weight Shift Standing Fair   Skilled Intervention Verbal Cuing   Comments w/ and w/o 4ww   Bed Mobility    Comments eob pre/chair post   Activities of Daily Living   Grooming Supervision;Standing  (oral care)   Skilled Intervention Verbal Cuing;Sequencing   Comments discussed showering and how to safely cover trach hole to prevent water going into lungs   Functional Mobility   Sit to Stand Supervised   Bed, Chair, Wheelchair Transfer Supervised   Mobility EOB>CHair>Mendoza>Chair   Skilled Intervention Verbal Cuing   Comments w/ 4ww   Patient / Family Goals   Patient / Family Goal #1 to get better "   Goal #1 Outcome Progressing as expected   Short Term Goals   Short Term Goal # 1 B  Pt will demo LB dressing w/ SPV   Goal Outcome  # 1 B Goal met  (goal met per last session,  also able to assist)   Short Term Goal # 2 B  standing g/h with SPV   Goal Outcome # 2 B Goal met   Short Term Goal # 3 B toilet txf with SPV and no v/cs for safety/technique   Goal Outcome # 3 B Progressing as expected

## 2021-06-09 NOTE — DIETARY
Nutrition Services: Update   Day 54 of admit.  Enedelia Pang is a 50 y.o. female with admitting DX of empyema lung.    Pt is currently on Minced and Moist, Mildly Thickened Liquids diet. PO per documentation since last RD follow up appears generally <50% of meals. Twice daily Boost supplements in place - patient reports she is drinking these, RD encouraged drinking these and reinforced importance of PO intake.     Per review of estimated needs, pt should be able to meet estimated energy needs through consumption of at least 25-50% of meals consistently and two Boost supplements per day.     Wt 61.3 kg via bed scale - 6/5. Fluctuations noted across admission - stable over past week.     Patient's estimated energy needs assessed below:  REE: MSJ x 1.2 = 1213 kcal/day  Calories: 7976-7659 kcal/day (20-25 kcal/kg current wt)  Protein: 73-92 g/day (1.2-1.5 g/kg current wt)    Malnutrition Risk: Criteria not met.     Recommendations/Plan:  1. Continue supplements as ordered.   2. Encourage intake of meals and supplements.   3. Document intake of all meals as % taken in ADL's to provide interdisciplinary communication across all shifts.   4. Monitor weight.  5. Nutrition rep will continue to see patient for ongoing meal and snack preferences.    RD following

## 2021-06-09 NOTE — PROGRESS NOTES
Hospital Medicine Daily Progress Note    Date of Service  6/9/2021    Chief Complaint  50 y.o. female admitted 4/16/2021 with altered mental status    Hospital Course  This is a 50-year-old female with a past medical history of pseudotumor cerebri s/p  shunt implantation by Dr. 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  [facility] infectious disease is managing antibiotics for the patient.  Dr. Candelaria surgery removed the port on 4/12.  The patient was further identified to have endocarditis of the tricuspid valve, a lumbar puncture performed on 4/14 showed pleocytosis with negative Gram stain.  The patient suffered worsening leukocytosis and was found to have septic emboli per CT.  Initially a small pleural catheter was placed.  Significant loculations were encountered.  MSSA empyema was diagnosed.  Neurosurgery was consulted to comment on possibly removal of a  shunt, this was felt not appropriate at this time.  The patient remained on mechanical ventilation for 15 days, then a perc trach was placed.  The patient was eventually liberated from mechanical ventilation, the patient did have a right thoracoscopy and decortication of the lung performed on 4/28 by Dr. Ganser.  The patient was of identified to have multiple sources for MSSA including mitral valve, tricuspid valve vegetation, port, spinal stimulator,  shunt possibly.  Back to the OR for VAT and decortication on 5/17  Trach de-canulated 6/8       Interval Problem Update  Pt states she feels good today.  Trach removed yesterday.  Feels a little more SOB when walking today which  she feels is due to having to breathe through her nose/mouth vs the Trach but otherwise has no complaints    Consultants/Specialty  ID    Code Status  Full Code    Disposition  TBD: home with HH vs SNF    Review of Systems  Review of Systems   Constitutional: Negative for chills and fever.   HENT: Negative for nosebleeds and sore throat.    Eyes: Negative for blurred vision and double vision.   Respiratory: Positive for shortness of breath. Negative for cough.    Cardiovascular: Negative for chest pain, palpitations and leg swelling.   Gastrointestinal: Negative for abdominal pain, diarrhea, nausea and vomiting.   Genitourinary: Negative for dysuria and urgency.   Musculoskeletal: Negative for back pain.   Skin: Negative for rash.   Neurological: Negative for dizziness, loss of consciousness and headaches.        Physical Exam  Temp:  [36.1 °C (97 °F)-36.8 °C (98.3 °F)] 36.8 °C (98.3 °F)  Pulse:  [] 90  Resp:  [16-20] 16  BP: ()/(55-67) 94/59  SpO2:  [95 %-98 %] 95 %    Physical Exam  Vitals reviewed.   Constitutional:       General: She is not in acute distress.     Appearance: Normal appearance. She is well-developed. She is not diaphoretic.   HENT:      Head: Normocephalic and atraumatic.   Neck:      Vascular: No JVD.   Cardiovascular:      Rate and Rhythm: Normal rate and regular rhythm.      Heart sounds: Murmur heard.     Pulmonary:      Effort: Pulmonary effort is normal. No respiratory distress.      Breath sounds: No stridor. No wheezing or rales.   Abdominal:      Palpations: Abdomen is soft.      Tenderness: There is no abdominal tenderness. There is no guarding or rebound.   Musculoskeletal:         General: No tenderness.      Right lower leg: No edema.      Left lower leg: No edema.   Skin:     General: Skin is warm and dry.      Findings: No rash.   Neurological:      General: No focal deficit present.      Mental Status: She is alert and oriented to person, place, and time.    Psychiatric:         Mood and Affect: Mood normal.         Thought Content: Thought content normal.         Fluids    Intake/Output Summary (Last 24 hours) at 6/9/2021 0624  Last data filed at 6/8/2021 2012  Gross per 24 hour   Intake 840 ml   Output --   Net 840 ml       Laboratory      Recent Labs     06/07/21  0415 06/08/21  0530   SODIUM 131* 133*   POTASSIUM 3.8 3.9   CHLORIDE 97 96   CO2 28 30   GLUCOSE 75 80   BUN 4* 4*   CREATININE <0.17* 0.25*   CALCIUM 8.5 8.8                   Imaging  DX-CHEST-PORTABLE (1 VIEW)   Final Result      1.  Interval removal of the right-sided chest tubes. No pneumothorax is identified.   2.  Airspace opacities in the right are mildly improved and may represent atelectasis/consolidation.   3.  There is likely a small right pleural effusion.   4.  Patchy opacities on the left are mildly improved.   5.  Interval removal of the enteric tube.      DX-CHEST-PORTABLE (1 VIEW)   Final Result         1. Stable lines and tubes.   2. Stable ill-defined bilateral pulmonary opacities, right worse than left. No large pleural effusions.         DX-CHEST-PORTABLE (1 VIEW)   Final Result      1.  Decrease in volume of right pleural fluid collection/empyema with 2 right chest tubes in place. No pneumothorax identified.      2.  No focal consolidation in the left lung.      DX-CHEST-PORTABLE (1 VIEW)   Final Result      No significant interval change.      DX-CHEST-PORTABLE (1 VIEW)   Final Result         1.  Hazy right pulmonary infiltrates and/or effusion, stable compared to prior study.      DX-CHEST-PORTABLE (1 VIEW)   Final Result         1.  Hazy right pulmonary infiltrates and/or effusion, stable compared to prior study.      DX-CHEST-PORTABLE (1 VIEW)   Final Result      1.  All lines and tubes appear appropriately located      2.  Consolidation throughout the right lung consistent with pneumonia      DX-CHEST-PORTABLE (1 VIEW)   Final Result         1.  Hazy right pulmonary  infiltrates and/or effusion, increased compared to prior study.      DX-ABDOMEN FOR TUBE PLACEMENT   Final Result      Feeding tube looped in the stomach.      DX-CHEST-PORTABLE (1 VIEW)   Final Result         1. Stable lines and tubes. No pneumothorax detected.   2. Persistent opacities in the right perihilar region and in the right lung periphery, similar to prior study. Left lung is essentially clear.      DX-CHEST-PORTABLE (1 VIEW)   Final Result      Postoperative changes of the right chest with placement of an additional chest tube. There is improved aeration in the right lung. No significant pleural effusion or definite pneumothorax.      Right IJ central venous catheter tip projects over the proximal right atrium.      Hypoinflation with interstitial and hazy pulmonary opacities.      DX-CHEST-PORTABLE (1 VIEW)   Final Result         1.  Hazy right pulmonary infiltrates and/or effusion, similar compared to prior study.      DX-ABDOMEN FOR TUBE PLACEMENT   Final Result      Feeding tube tip projects over the expected location the gastric antrum.      DX-CHEST-PORTABLE (1 VIEW)   Final Result         1.  Hazy right pulmonary infiltrates and/or effusion, similar compared to prior study.      DX-CHEST-LIMITED (1 VIEW)   Final Result      1.  No pneumothorax. Only one chest tube is now seen in the right lung.   2.  The chest is otherwise stable.      DX-CHEST-PORTABLE (1 VIEW)   Final Result         1.  Hazy right pulmonary infiltrates and/or effusion, similar compared to prior study.      DX-CHEST-PORTABLE (1 VIEW)   Final Result         1.  Hazy right pulmonary infiltrates and/or effusion, similar compared to prior study.      DX-ABDOMEN FOR TUBE PLACEMENT   Final Result         1.  Nonspecific bowel gas pattern.   2.  Dobbhoff tube tip terminates overlying the expected location of the gastric antrum.   3.  Hazy bilateral lung base infiltrates, greater on the right.      DX-CHEST-PORTABLE (1 VIEW)   Final  Result         1.  Hazy right pulmonary infiltrates and/or effusion, similar compared to prior study.      US-RUQ   Final Result         1.  Hepatomegaly   2.  Mild intrahepatic and extrahepatic biliary ductal dilatation, commonly associated with postcholecystectomy physiology, consider causes of biliary obstruction with additional workup as clinically appropriate      CT-CHEST,ABDOMEN,PELVIS WITH   Final Result      1.  Large empyema in the RIGHT hemithorax, slightly decreased in size from prior exam with chest tubes present.   2.  Consolidation remains.   3.  Multiple cavitary lesions again seen in the LEFT upper lobe, minimally decreased from prior, concerning for septic emboli.   4.  Supportive tubing is unchanged.   5.  Intrahepatic and extrahepatic biliary dilation, likely postoperative although distal stricture, stone or mass remain possibilities.   6.  Moderate pelvic fluid, nonspecific.   7.  No evidence of bowel obstruction or perforation.      DX-CHEST-PORTABLE (1 VIEW)   Final Result         1.  Hazy right pulmonary infiltrates and/or effusion, similar compared to prior study.      DX-CHEST-PORTABLE (1 VIEW)   Final Result         No significant interval change.            DX-CHEST-PORTABLE (1 VIEW)   Final Result         Interval removal of left central venous catheter. Interval adjustment of right PICC with tip in the SVC.         DX-ABDOMEN FOR TUBE PLACEMENT   Final Result      Feeding tube tip at the mid to distal stomach.      IR-PICC LINE PLACEMENT W/ GUIDANCE > AGE 5   Final Result                  Ultrasound-guided PICC placement performed by qualified nursing staff as    above.          DX-CHEST-PORTABLE (1 VIEW)   Final Result      1.  Stable cardiopulmonary findings.   2.  See comments above regarding the right-sided PICC position.      DX-CHEST-PORTABLE (1 VIEW)   Final Result      Stable chest findings compared to 5/5.      DX-CHEST-PORTABLE (1 VIEW)   Final Result      Stable chest  findings compared with 5/3.      DX-ABDOMEN FOR TUBE PLACEMENT   Final Result         Feeding tube with tip projecting over the expected area of the stomach.      DX-CHEST-PORTABLE (1 VIEW)   Final Result      Stable chest findings compared with prior.      DX-CHEST-PORTABLE (1 VIEW)   Final Result         1. No significant interval change.      US-EXTREMITY ARTERY LOWER UNILAT RIGHT   Final Result      DX-CHEST-PORTABLE (1 VIEW)   Final Result         1. No significant interval change.      IR-PICC LINE PLACEMENT W/ GUIDANCE > AGE 5   Final Result                  Ultrasound-guided PICC placement performed by qualified nursing staff as    above.          DX-CHEST-PORTABLE (1 VIEW)   Final Result         1.  Pulmonary edema and/or infiltrates, similar to prior study.   2.  Stable opacification right lung, likely effusion. Thoracostomy tubes are in place.   3.  Multiple cavitary appearing left pulmonary lesions, see CT performed April 27, 2021 for further characterization      DX-CHEST-PORTABLE (1 VIEW)   Final Result         1.  Pulmonary edema and/or infiltrates, similar to prior study.   2.  Single opacification right lung, likely effusion. Thoracostomy tubes are in place.   3.  Multiple cavitary appearing left pulmonary lesions, see CT performed April 27, 2021 for further characterization   4.  Soft tissue gas in the right chest wall      DX-CHEST-PORTABLE (1 VIEW)   Final Result         1.  Pulmonary edema and/or infiltrates, similar to prior study.   2.  Increased opacification right lung, likely increased effusion. Thoracostomy tubes are in place.   3.  Multiple cavitary appearing left pulmonary lesions, see CT performed April 27, 2021 for further characterization   4.  Soft tissue gas in the right chest wall      DX-CHEST-PORTABLE (1 VIEW)   Final Result         1.  Pulmonary edema and/or infiltrates, similar to prior study.   2.  Right pneumothorax, stable compared to prior study, right thoracostomy tubes  remain in place   3.  Multiple cavitary appearing left pulmonary lesions, see CT performed April 27, 2021 for further characterization   4.  Soft tissue gas in the right chest wall      DX-CHEST-PORTABLE (1 VIEW)   Final Result         1.  Pulmonary edema and/or infiltrates, similar to prior study.   2.  Right pneumothorax, interval decreased compared to prior study, interval placement of right thoracostomy tubes is noted.   3.  Multiple cavitary appearing left pulmonary lesions, see CT performed yesterday for further characterization   4.  Soft tissue gas in the right chest wall      DX-CHEST-PORTABLE (1 VIEW)   Final Result         1.  Pulmonary edema and/or infiltrates, similar to prior study.   2.  Right pneumothorax, stable compared to prior study, interval removal of right thoracostomy tube is noted.   3.  Multiple cavitary appearing left pulmonary lesions, see CT performed yesterday for further characterization   4.  Soft tissue gas in the right chest wall      DX-CHEST-LIMITED (1 VIEW)   Final Result         No significant interval change.      CT-CTA CHEST PULMONARY ARTERY W/ RECONS   Final Result         No CT evidence of pulmonary embolus.      Previous right chest tube were removed.      Grossly similar in size of the loculated right hydropneumothorax but there is increased layering fluid component. Unchanged mild shift of the mediastinal to the left.      Redemonstration of a pigtail catheter in the medial left lung base. Grossly similar multiple cavitary lesions in the left lung.      US-EXTREMITY VENOUS LOWER BILAT   Final Result      POCT BUTTERFLY-DUPLEX SCAN EXTREMITY VEINS LIMITED   Final Result      DX-CHEST-PORTABLE (1 VIEW)   Final Result         1.  Pulmonary edema and/or infiltrates, similar to prior study.   2.  Right pneumothorax, somewhat decreased to prior study, interval removal of right thoracostomy tube is noted.   3.  Multiple cavitary appearing left pulmonary lesions, see CT  performed yesterday for further characterization   4.  Soft tissue gas in the right chest wall      DX-CHEST-PORTABLE (1 VIEW)   Final Result         1.  Pulmonary edema and/or infiltrates, similar to prior study.   2.  Right pneumothorax, similar to prior study with thoracostomy tube in place.   3.  Multiple cavitary appearing left pulmonary lesions, see CT performed yesterday for further characterization      CT-CHEST (THORAX) W/O   Final Result      Large loculated right hydropneumothorax with interval increase in size of the pneumothorax and pleural fluid.      Right chest tube is in the posterior right thorax.      There is mediastinal shift to the left compatible with tension.      Small pericardial effusion.      Small loculated left pleural effusion with overlying atelectasis/consolidation.   Pigtail catheter is seen at the medial left lung base. Pleural effusion has decreased in size compared to prior.      There are multiple foci of air extending from the right lung to the pleural space suspicious for a bronchopleural fistula.      Multiple cavitary lesions bilaterally with mild interval decrease of the solid component of the cavitary nodules.      Noncavitary 3.1 cm masslike density is seen at the left lung base.      Findings may be related to septic emboli, malignancy or multifocal abscesses. Short interval follow-up recommended.      Soft tissue air on the right is again seen.      Splenomegaly      Findings discussed with Leti Sun.      DX-CHEST-PORTABLE (1 VIEW)   Final Result      No significant change from prior exam.      CT-HEAD W/O   Final Result      1.  RIGHT frontal ventriculostomy catheter unchanged in position.   2.  Mild cortical atrophy.   3.  No intracranial hemorrhage.   4.  Apparent dural thickening overlying the clivus.  Consider further evaluation with contrast-enhanced MRI.      DX-CHEST-LIMITED (1 VIEW)   Final Result      Interval left basilar pigtail catheter with diminished  atelectasis, pleural fluid      Stable right hydropneumothorax with thoracostomy tube in place, considerable soft tissue gas and partial lung collapse      IR-CHEST TUBE-EMPYEMA LEFT   Final Result      1.  CT GUIDED LEFT  10 [place] PIGTAIL CHEST TUBE PLACEMENT FOR POSSIBLE EMPYEMA YIELDING OLD BLOODY FLUID.      2. A CHEST RADIOGRAPH IS FORTHCOMING.      EC-MICAH W/O CONT   Final Result      US-ABDOMEN LTD (SOFT TISSUE)   Final Result      No fluid seen surrounding the electronic implanted spinal stimulator device.      DX-CHEST-LIMITED (1 VIEW)   Final Result      1.  Large right hydropneumothorax with right-sided chest tube in place, likely stable to slightly decreased from prior study.   2.  Small left pleural effusion. Mild improved aeration in the left lung.   3.  Bilateral pulmonary opacities which could be related to combination of atelectasis, edema and/or infection..      CT-CTA HEAD WITH & W/O-POST PROCESS   Final Result      1.  Patent carotid and vertebral arteries.      2.  Again seen right frontal the jugular peritoneal shunt catheter. There is mild increase in volume of the lateral and third ventricles.      CT-CHEST (THORAX) W/O   Final Result      1.  Interval placement of a right-sided chest tube into a large loculated right-sided pleural effusion. There is now seen a large right-sided hydropneumothorax in the area that previously seen fluid. The chest tube extends into that hydropneumothorax.    There is some residual pleural fluid seen as well. A hydropneumothorax is somewhat loculated with components most prominently inferiorly and medially.      2.  New loculated left pleural effusion.      3.  Again seen multiple bilateral cavitary pulmonary masses which are more prominent than previously seen with increasing cavitation and measure up to 3 cm size. Differential diagnosis includes septic emboli, multifocal abscesses and neoplasm.      4.  Large amount of subcutaneous emphysema on the right.       5.  Splenomegaly.      6.  Multiple support devices.      Fleischner Society pulmonary nodule recommendations:         DX-CHEST-LIMITED (1 VIEW)   Final Result      1.  Support devices as described above.      2.  Right chest tube present with a again seen right-sided pneumothorax, possibly increased in size and loculated.      3.  Worsening bilateral pulmonary infiltrates.      DX-ABDOMEN FOR TUBE PLACEMENT   Final Result      Cortrak feeding tube tip projects in the region of the duodenal bulb.      DX-CHEST-LIMITED (1 VIEW)   Final Result      Loculated right pneumothorax is decreased in size compared to prior.      Small left pleural effusion.      Small loculated right pleural effusion.      Extensive bilateral airspace opacities are not significantly changed.      Soft tissue air on the right is again noted.      DX-CHEST-LIMITED (1 VIEW)   Final Result      Decreased partially loculated right pleural fluid with increased pleural air. Right chest tube is in place.      Increased hazy opacity in the left lung possibly atelectasis.         US-ABDOMEN LTD (SOFT TISSUE)   Final Result      No drainable fluid collection.      DX-CHEST-PORTABLE (1 VIEW)   Final Result      Decreased partially loculated right pleural fluid with increased pleural air. Right chest tube is in place.      No other significant change.      DX-CHEST-PORTABLE (1 VIEW)   Final Result         1.  Pulmonary edema and/or infiltrates are identified, which are somewhat decreased since the prior exam.   2.  Moderate loculated appearing right pleural effusion, slightly decreased since prior      DX-ABDOMEN FOR TUBE PLACEMENT   Final Result      Feeding tube tip at the distal stomach.      DX-CHEST-PORTABLE (1 VIEW)   Final Result         1.  New chest tube is noted in the right lower hemithorax.      2.  Right pleural effusion is again identified which appears similar to the prior exam.      3.  No new infiltrates or consolidations are  identified.      DX-CHEST-PORTABLE (1 VIEW)   Final Result         1.  Pulmonary edema and/or infiltrates are identified, which are stable since the prior exam.   2.  Moderate loculated appearing right pleural effusion      DX-CHEST-PORTABLE (1 VIEW)   Final Result         No significant interval change.      POCT BUTTERFLY-ECHOCARDIOGRAM LTD W/O CONT    (Results Pending)        Assessment/Plan  * Acute bacterial endocarditis- (present on admission)  Assessment & Plan  Mitral valve, tricuspid valve  MSSA  Continue Ancef through 6/14/2021    Vaginal itching  Assessment & Plan  Topical Chlortrimazole    Debility  Assessment & Plan  Acute on chronic  Recovering well: may need rehab vs DC with HH if she cont's to do well    Elevated alkaline phosphatase level- (present on admission)  Assessment & Plan  The patient not able to tolerate a MRCP at this time, ultrasound grossly unremarkable, observe    Agitation requiring sedation protocol- (present on admission)  Assessment & Plan  resolved    Goals of care, counseling/discussion  Assessment & Plan  Patient with overall high risk of morbidity and mortality given her gravely ill state and prior chronic medical conditions  Palliative care following    Spinal cord stimulator status- (present on admission)  Assessment & Plan  Patient's stimulator is Nuvectra. It is FDA approved as MRI compatible, but the company has gone out of business, so there is no support team to assist with MRI.  Thus, if MRI were performed, our radiologists would need to protocol it correctly to manage the stimulator. The former rep from [employer] is Andre, [phone].  Information obtained from Dr. Mahoney's office, [phone].     Per  (5/4/2021), it is not MRI compatible unfortunately.     Thrombocytosis (HCC)  Assessment & Plan  Acute reaction since resolved    Anasarca- (present on admission)  Assessment & Plan  Improved    Pleural effusion, left  Assessment & Plan  resolved    Trapped  lung  Assessment & Plan  Now s/p decortication   Chest tubes removed on 5/26, observe    Pulmonary abscess (HCC)  Assessment & Plan  From septic emboli  Now s/p decortication    Fever  Assessment & Plan  Resolved, monitor, antibiotic therapy    Pneumonia  Assessment & Plan  MSSA  Ancef per ID    History of vasculitis- (present on admission)  Assessment & Plan  Does not appear to be a current issue    Bacteremia due to methicillin susceptible Staphylococcus aureus (MSSA)- (present on admission)  Assessment & Plan  Ancef through 6/14    CSF pleocytosis- (present on admission)  Assessment & Plan  Infectious disease managing    Empyema of right pleural space (HCC)- (present on admission)  Assessment & Plan  Patient s/p multiple chest tubes, thoracotomy and decortication  Now s/p second decortication 5/17  Per thoracic surgery note, sutures to be removed on 6/9  Plan Abx's through 6/14      Acute blood loss anemia  Assessment & Plan  Likely secondary to hemothorax  300 cc of blood removed after chest tube placed at [facility]  Hb has been stable around 9-10    Prolonged Q-T interval on ECG- (present on admission)  Assessment & Plan  Resolved after correcting metabolic issues  Cont to follow   Monitor electrolytes, acidosis etc    Acute respiratory failure with hypoxia (HCC)- (present on admission)  Assessment & Plan  Secondary to MSSA bacteremia, empyema, pulmonary septic emboli  S/p tracheostomy, trach is been capped for more than 24 hours, I discussed with respiratory therapy and evaluated the patient, we agreed decannulation is appropriate  Postacute planning    History of complicated migraines- (present on admission)  Assessment & Plan  Monitor    GERD (gastroesophageal reflux disease)- (present on admission)  Assessment & Plan  History of, as needed treatment    Hyponatremia  Assessment & Plan  Mild - asymptomatic  Monitor    Tachycardia  Assessment & Plan  Persistent, likely secondary with multiple underlying  ongoing conditions  Monitor  Slowly trending down; now 80-90s    Pseudotumor cerebri  Assessment & Plan  History of  shunt, neurosurgery opted against removal    H/O: CVA (cerebrovascular accident)- (present on admission)  Assessment & Plan  Monitor    Chronic pain syndrome- (present on admission)  Assessment & Plan  Pre-existing, pain management    Port or reservoir infection  Assessment & Plan  S/p removal    Thrombocytopenia (HCC)- (present on admission)  Assessment & Plan  Transient, resolved    Anemia- (present on admission)  Assessment & Plan  Monitor, transfuse as indicated    Septic shock (HCC)- (present on admission)  Assessment & Plan  Sepsis is resolved    Sepsis (HCC)  Assessment & Plan  Sepsis has resolved    Hypomagnesemia  Assessment & Plan  Repeat lab tomorrow    Hypokalemia  Assessment & Plan  Monitor, replace and recheck    Anxiety- (present on admission)  Assessment & Plan  Continue Cymbalta and Paxil  As needed Valium    S/P  shunt- (present on admission)  Assessment & Plan  History of, Dr. Oh felt there is currently no need to remove    Folliculitis  Assessment & Plan  Improved    Acute encephalopathy- (present on admission)  Assessment & Plan  Per neurosurgery no need to remove  shunt  Improved         VTE prophylaxis: enoxaparin

## 2021-06-09 NOTE — PROGRESS NOTES
Assessment/description of ears? Intact, pink, blanching.   Which preventative measures are in place for the ears?  Grey foam padding on oxygen tubing.     Assessment/description of elbows? Intact, pink, blanching.   Which preventative measures are in place for the elbows?  Patient encouraged to turn and reposition frequently.     Assessment/description of sacrum? Red, excoriated, peeling.   Which preventative measures are in place for the sacrum?  Patient encouraged to turn and reposition frequently, nystatin powder prn.    Assessment/description of heels? Intact, pink, slightly boggy.  Which preventative measures are in place for the heels?  Patient encouraged to turn and reposition frequently, mepilex in place.     Which devices are in place? Nasal cannula, PICC.  Description of skin under devices: Clean, dry, intact, blanching.   Which preventative measures are in place under devices?  Weekly dressing changes, Q shift assessment under nasal cannula.    Other: Island dressing to right side of rib cage, pressure injury on great toe of right foot, pressure injury on 3rd toe of left foot.

## 2021-06-09 NOTE — THERAPY
Speech Language Pathology  Daily Treatment     Patient Name: Enedelia Pang  Age:  50 y.o., Sex:  female  Medical Record #: [id number]  Today's Date: 6/9/2021     Precautions  Precautions: Swallow Precautions ( See Comments)  Comments: Decannulated 6/8!    Assessment    Patient seen for swallowing reassessment this date. Decannulation yesterday and patient currently on room air.  Education to patient and  regarding digital occlusion of stoma site with 2 fingers when coughing, talking, swallowing or bearing down in order to minimize air leakage and facilitate stoma closure.  Once educated, patient was >90% consistent with occluding stoma during this session. Voice was noted to be fairly strong and clear.  Presentation of PO consisted of thin liquids, mildly thick liquids, minced and moist, soft and bite sized and regular/easy to chew consistencies.  She was able to feed herself and occlude stoma site when swallowing.  She was able to state she needs to use double swallow after each bite and sip.  She did have cough x1 on mildly thick liquids when taking consecutive swallows from cup.  With single sips, she did well, and was also able to take her pills whole with MTL wash without any noted difficulty at all.  On minced and moist and soft and bite sized, she had throat clearing x1, but no other S/Sx of aspiration noted.  On thin liquids, she had delayed coughing x3 and wet voice x1 which is consistent with possible bolus misdirection (penetration/aspiration).  She did have some difficulty with mastication of regular consistencies and reported that they were hard to swallow due to dryness.  She did cough x1 following swallow.  Overall, she continues to make progress!  Will upgrade diet soft and bite sized diet with mildly thick liquids (SB6/MT2) with 1:1 feeding and use of digital occlusion of stoma site when swallowing.  Float pills whole in puree or with mildly thick liquids.  SLP will continue to follow. If  patient continues to have S/Sx of aspiration with trials of thins, may consider repeat diagnostic evaluation.     Plan     1) Soft and bite sized diet with mildly thick liquids (SB6/MT2) with 1:1 supervision  2) Float/crush pills in puree or with mildly thick liquids depending on size and patient preference.   3) Small bites and sips:  Double swallow for each bite and sip  4) NO straws  5) Encourage patient to digitally occlude stoma site with 2 fingers when swallowing or talking in order to facilitate stoma closure.      Continue current treatment plan.    Discharge Recommendations: Recommend home health for continued speech therapy services    Objective     06/09/21 1539   Pain 0 - 10 Group   Therapist Pain Assessment 0;Nurse Notified;Post Activity Pain Same as Prior to Activity   Dysphagia    Positioning / Behavior Modification Effortful Swallow;Multiple Swallows;Self Monitoring  (digital occlusion of stoma site for swallow)   Oral / Pharyngeal / Laryngeal Exercises Pharyngeal Constriction Exercises;Laryngeal Exercises  (falsetto and effortful swallow x 10 each)   Other Treatments PO trials of MM5/SB6/EC7/RG7/TN0 and MT2   Diet / Liquid Recommendation Mildly Thick (2) - (Nectar Thick);Soft & Bite-Sized (6) - (Dysphagia III)   Nutritional Liquid Intake Rating Scale Thickened beverages (mildly thick unless otherwise specified)   Nutritional Food Intake Rating Scale Total oral diet with multiple consistencies but requiring special preparation or compensations   Nursing Communication Swallow Precaution Sign Posted at Head of Bed   Skilled Intervention Compensatory Strategies;Verbal Cueing;Gestural Cueing   Comments  present   Recommended Route of Medication Administration   Medication Administration  Float Whole with Puree   Short Term Goals   Short Term Goal # 1 B  Pt will tolerate trache capping with no overt S/Sx of respiratory distress noted   Goal Outcome  # 1 B Goal met  (decannulated 6/8)   Short Term  "Goal # 3 NEW 6/9:  Pt will be able to consume SB6/MT2 diet with no overt S/Sx of aspiration noted given min cues to use swallow precautions and 1:1 assistance.    Goal Outcome  # 3 Progressing as expected   Short Term Goal # 3 B  New 6/7: Pt will consume MM5/mildly thick liquids with 1:1 supervision/feeding and min cues to use swallow strategies with no S/Sx of aspiration or respiratory distress   Goal Outcome  # 3 B Goal met   Short Term Goal # 4 6/9/21: Pt will be able to complete 10/10 reps of base of tongue, laryngeal elevation and pharyngeal constriction exercises with min cues and \"good\" accuracy as judged by SLP   Goal Outcome  # 4 Progressing as expected   Anticipated Discharge Needs   Discharge Recommendations Recommend home health for continued speech therapy services   Therapy Recommendations Upon DC Dysphagia Training;Patient / Family / Caregiver Education;Community Re-Integration         "

## 2021-06-09 NOTE — CARE PLAN
The patient is Stable - Low risk of patient condition declining or worsening    Shift Goals  Clinical Goals: O2 sats will remain above 90  Patient Goals: Pain   Family Goals: NA    Progress made toward(s) clinical / shift goals: Lung sounds and respiratory effort assessed regularly. RT protocol in effect. Monitor oxygen saturation with .   Patient is not progressing towards the following goals:

## 2021-06-09 NOTE — CARE PLAN
The patient is Stable - Low risk of patient condition declining or worsening    Shift Goals  Clinical Goals: O2 sats will remain above 95%  Patient Goals: Pain     Progress made toward(s) clinical / shift goals: Patient medicated with Oxy 5 Q4 hours prn. Patient requests to take Oxy with Benadryl to help amplify the efficacy, per the patient.    Patient is not progressing towards the following goals: N/A.    Problem: Pain Management  Goal: Pain level will decrease to patient's comfort goal  6/9/2021 0154 by Grace Lester R.N.  Outcome: Not Progressing  Flowsheets (Taken 6/9/2021 0031)  Comfort Goal:   Comfort with Movement   Sleep Comfortably  Pain Rating Scale (NPRS): 10  Note: Patient assessed for pain regularly and medicated PRN per MAR.    6/9/2021 0022 by Grace Lester R.N.  Outcome: Progressing  Flowsheets (Taken 6/8/2021 2100)  Pain Rating Scale (NPRS): 7  Note: Patient assessed for pain regularly and medicated PRN per MAR.        Problem: Respiratory  Goal: Patient will achieve/maintain optimum respiratory ventilation and gas exchange  6/9/2021 0154 by Grace Lester R.N.  Outcome: Progressing  Flowsheets (Taken 6/8/2021 2100 by Whit Suarez, ARISTIDES)  O2 Delivery Device: Silicone Nasal Cannula  Note: Patient was decannulated on 6/8. Patient is currently on 2 liters of oxygen and satting in mid to high 90s. Continuous pulse oximeter in place to monitor oxygen saturations.   6/9/2021 0022 by Grace Lester R.N.  Outcome: Progressing  Flowsheets (Taken 6/8/2021 2100 by Whit Suarez, ARISTIDES)  O2 Delivery Device: Silicone Nasal Cannula  Note: Patient

## 2021-06-09 NOTE — DISCHARGE PLANNING
Agency/Facility Name: [facility]  Outcome: This DPA left a vmail for Aayush. Awaiting call back.     Agency/Facility Name: [facility]  Outcome: This DPA left a vmail for Romy. Awaiting call back.       Agency/Facility Name: [facility]  Spoke To: Romy  Outcome: Declined - Romy stated they can't take pt due to them having a trache    Agency/Facility Name: [facility]  Spoke To: Romy   Outcome: Declined - Romy stated they can't take pt due to them having a trache

## 2021-06-09 NOTE — PROGRESS NOTES
Received report from day shift RN and assumed care of patient.   Pt is resting in bed watching tv, A&Ox4, on 2L oxygen via nasal cannula, VSS.   Assessment completed, POC discussed.   Reports pain in head and right rib cage incision; no pain medication due at this time. Pt agrees to rest at this time.   Scheduled medications given per MAR.   Bed is in lowest, locked position, call bell and belongings are in reach.   Hourly rounding and safety precautions in place.   No further needs at this time.

## 2021-06-09 NOTE — FACE TO FACE
Face to Face Note  -  Durable Medical Equipment    Jorge Canales D.O. - NPI: [id number]  I certify that this patient is under my care and that they had a durable medical equipment(DME)face to face encounter by myself that meets the physician DME face-to-face encounter requirements with this patient on:    Date of encounter:   Patient:                    MRN:                       YOB: 2019  Enedelia Pang  [id number]  1970     The encounter with the patient was in whole, or in part, for the following medical condition, which is the primary reason for durable medical equipment:  Other - endocarditis    I certify that, based on my findings, the following durable medical equipment is medically necessary:  Walkers and 3 in 1 Bedside [place].    HOME O2 Saturation Measurements:(Values must be present for Home Oxygen orders)         ,     ,         My Clinical findings support the need for the above equipment due to:  Abnormal Gait    Supporting Symptoms: devices for ADLs    If patient feels more short of breath, they can go up to 6 liters per minute and contact healthcare provider.

## 2021-06-09 NOTE — DISCHARGE PLANNING
Agency/Facility Name: [facility]  Outcome: DPA received vmail from Mervat. Declined - care exceeds reimbursement rate from insurance.     THANH Perdoza notified via Teams

## 2021-06-09 NOTE — CARE PLAN
Problem: Nutritional:  Goal: Achieve adequate nutritional intake  Description: Patient will consume 50% of meals  Outcome: Progressing   See RD note; RD continues to follow.

## 2021-06-10 ENCOUNTER — APPOINTMENT (OUTPATIENT)
Dept: RADIOLOGY | Facility: MEDICAL CENTER | Age: 51
DRG: 003 | End: 2021-06-10
Attending: INTERNAL MEDICINE
Payer: MEDICARE

## 2021-06-10 LAB
CK SERPL-CCNC: 17 U/L (ref 0–154)
MAGNESIUM SERPL-MCNC: 1.6 MG/DL (ref 1.5–2.5)

## 2021-06-10 PROCEDURE — 83735 ASSAY OF MAGNESIUM: CPT

## 2021-06-10 PROCEDURE — A9270 NON-COVERED ITEM OR SERVICE: HCPCS | Performed by: HOSPITALIST

## 2021-06-10 PROCEDURE — 700102 HCHG RX REV CODE 250 W/ 637 OVERRIDE(OP): Performed by: HOSPITALIST

## 2021-06-10 PROCEDURE — 82550 ASSAY OF CK (CPK): CPT

## 2021-06-10 PROCEDURE — 700111 HCHG RX REV CODE 636 W/ 250 OVERRIDE (IP): Performed by: STUDENT IN AN ORGANIZED HEALTH CARE EDUCATION/TRAINING PROGRAM

## 2021-06-10 PROCEDURE — 700111 HCHG RX REV CODE 636 W/ 250 OVERRIDE (IP): Performed by: INTERNAL MEDICINE

## 2021-06-10 PROCEDURE — 770006 HCHG ROOM/CARE - MED/SURG/GYN SEMI*

## 2021-06-10 PROCEDURE — 97530 THERAPEUTIC ACTIVITIES: CPT

## 2021-06-10 PROCEDURE — 700117 HCHG RX CONTRAST REV CODE 255: Performed by: INTERNAL MEDICINE

## 2021-06-10 PROCEDURE — 97116 GAIT TRAINING THERAPY: CPT

## 2021-06-10 PROCEDURE — 99232 SBSQ HOSP IP/OBS MODERATE 35: CPT | Performed by: HOSPITALIST

## 2021-06-10 PROCEDURE — 71260 CT THORAX DX C+: CPT

## 2021-06-10 PROCEDURE — 92526 ORAL FUNCTION THERAPY: CPT

## 2021-06-10 RX ADMIN — CEFAZOLIN SODIUM 2 G: 2 INJECTION, SOLUTION INTRAVENOUS at 13:37

## 2021-06-10 RX ADMIN — OXYCODONE 5 MG: 5 TABLET ORAL at 07:35

## 2021-06-10 RX ADMIN — DIVALPROEX SODIUM 500 MG: 125 CAPSULE ORAL at 06:00

## 2021-06-10 RX ADMIN — DIPHENHYDRAMINE HYDROCHLORIDE 50 MG: 50 INJECTION INTRAMUSCULAR; INTRAVENOUS at 20:59

## 2021-06-10 RX ADMIN — OXYCODONE 5 MG: 5 TABLET ORAL at 13:37

## 2021-06-10 RX ADMIN — METHADONE HYDROCHLORIDE 30 MG: 10 TABLET ORAL at 06:00

## 2021-06-10 RX ADMIN — ACETAMINOPHEN 500 MG: 325 TABLET ORAL at 21:06

## 2021-06-10 RX ADMIN — DOCUSATE SODIUM 50 MG AND SENNOSIDES 8.6 MG 2 TABLET: 8.6; 5 TABLET, FILM COATED ORAL at 06:00

## 2021-06-10 RX ADMIN — RISPERIDONE 2 MG: 2 TABLET ORAL at 06:06

## 2021-06-10 RX ADMIN — NYSTATIN: 100000 POWDER TOPICAL at 13:46

## 2021-06-10 RX ADMIN — DULOXETINE HYDROCHLORIDE 60 MG: 60 CAPSULE, DELAYED RELEASE ORAL at 17:04

## 2021-06-10 RX ADMIN — PAROXETINE HYDROCHLORIDE 10 MG: 10 TABLET, FILM COATED ORAL at 06:00

## 2021-06-10 RX ADMIN — NYSTATIN: 100000 POWDER TOPICAL at 06:01

## 2021-06-10 RX ADMIN — DIVALPROEX SODIUM 500 MG: 125 CAPSULE ORAL at 21:06

## 2021-06-10 RX ADMIN — DIAZEPAM 5 MG: 5 TABLET ORAL at 21:16

## 2021-06-10 RX ADMIN — GABAPENTIN 300 MG: 300 CAPSULE ORAL at 13:39

## 2021-06-10 RX ADMIN — IOHEXOL 100 ML: 350 INJECTION, SOLUTION INTRAVENOUS at 17:15

## 2021-06-10 RX ADMIN — DIVALPROEX SODIUM 500 MG: 125 CAPSULE ORAL at 13:38

## 2021-06-10 RX ADMIN — RISPERIDONE 2 MG: 2 TABLET ORAL at 17:04

## 2021-06-10 RX ADMIN — DOCUSATE SODIUM 50 MG AND SENNOSIDES 8.6 MG 2 TABLET: 8.6; 5 TABLET, FILM COATED ORAL at 17:04

## 2021-06-10 RX ADMIN — GABAPENTIN 300 MG: 300 CAPSULE ORAL at 06:06

## 2021-06-10 RX ADMIN — DIPHENHYDRAMINE HYDROCHLORIDE 50 MG: 50 INJECTION INTRAMUSCULAR; INTRAVENOUS at 01:07

## 2021-06-10 RX ADMIN — CLOTRIMAZOLE 1 APPLICATOR: 1 CREAM VAGINAL at 21:16

## 2021-06-10 RX ADMIN — CEFAZOLIN SODIUM 2 G: 2 INJECTION, SOLUTION INTRAVENOUS at 06:01

## 2021-06-10 RX ADMIN — DULOXETINE HYDROCHLORIDE 60 MG: 60 CAPSULE, DELAYED RELEASE ORAL at 06:00

## 2021-06-10 RX ADMIN — OXYCODONE 5 MG: 5 TABLET ORAL at 20:59

## 2021-06-10 RX ADMIN — DIPHENHYDRAMINE HYDROCHLORIDE 50 MG: 50 INJECTION INTRAMUSCULAR; INTRAVENOUS at 13:37

## 2021-06-10 RX ADMIN — CEFAZOLIN SODIUM 2 G: 2 INJECTION, SOLUTION INTRAVENOUS at 21:17

## 2021-06-10 RX ADMIN — OXYCODONE 5 MG: 5 TABLET ORAL at 01:07

## 2021-06-10 RX ADMIN — ENOXAPARIN SODIUM 40 MG: 40 INJECTION SUBCUTANEOUS at 06:00

## 2021-06-10 RX ADMIN — GABAPENTIN 300 MG: 300 CAPSULE ORAL at 21:06

## 2021-06-10 RX ADMIN — DIPHENHYDRAMINE HYDROCHLORIDE 50 MG: 50 INJECTION INTRAMUSCULAR; INTRAVENOUS at 07:35

## 2021-06-10 ASSESSMENT — PAIN DESCRIPTION - PAIN TYPE
TYPE: ACUTE PAIN

## 2021-06-10 ASSESSMENT — COGNITIVE AND FUNCTIONAL STATUS - GENERAL
SUGGESTED CMS G CODE MODIFIER MOBILITY: CH
MOBILITY SCORE: 24

## 2021-06-10 ASSESSMENT — ENCOUNTER SYMPTOMS
CHILLS: 0
BLURRED VISION: 0
NAUSEA: 0
COUGH: 0
FEVER: 0
HEADACHES: 0
DOUBLE VISION: 0
VOMITING: 0
SORE THROAT: 0
PALPITATIONS: 0
BACK PAIN: 0
ABDOMINAL PAIN: 0
LOSS OF CONSCIOUSNESS: 0
SHORTNESS OF BREATH: 1
DIZZINESS: 0
DIARRHEA: 0

## 2021-06-10 ASSESSMENT — GAIT ASSESSMENTS
DISTANCE (FEET): 150
ASSISTIVE DEVICE: FRONT WHEEL WALKER
GAIT LEVEL OF ASSIST: SUPERVISED

## 2021-06-10 NOTE — CARE PLAN
The patient is Stable - Low risk of patient condition declining or worsening    Shift Goals  Clinical Goals: Oxygen saturation above 90, Pain management  Patient Goals: Pain   Family Goals: NA    Progress made toward(s) clinical / shift goals:    Problem: Pain Management  Goal: Pain level will decrease to patient's comfort goal  Outcome: Progressing  Pt has PRN pharmacological interventions available.    Problem: Respiratory  Goal: Patient will achieve/maintain optimum respiratory ventilation and gas exchange  Outcome: Progressing  Pt is on 3L via NC, wean off as necessary        Patient is not progressing towards the following goals:

## 2021-06-10 NOTE — THERAPY
Speech Language Pathology  Daily Treatment     Patient Name: Enedelia Pang  Age:  50 y.o., Sex:  female  Medical Record #: [id number]  Today's Date: 6/10/2021     Precautions  Precautions: Swallow Precautions ( See Comments)  Comments: Decannulated 6/8    Assessment    Pt designated a high follow-up due to recent decannulation and newly ordered po diet.  Pt seen and is able to completed 10 reps each, of recommended oral motor exercises (falsetto, effortful swallow) with 100% accuracy with min cues and written aide once glasses are in place.  Pt is also able to verbalize swallow strategies with 100% accuracy given choice of two, or minimal cues.  Vocal quality is clear, speech rate is mildly delayed during conversational exchanges.  No s/s of aspiration on current recommended diet textures.  Diet order is new, no upgrade recommended at this time.  Repeat diagnostic indicated (FEES) if pt presents with any difficulty tolerating oral diet.    Plan    Continue current treatment plan.  SB6/MT2 (soft and bite sized solids, mildly thick liquids) with 1:1 monitor initially; hope to fade to 'monitor during meal' over the next week.     Discharge Recommendations: (P) Recommend home health for continued speech therapy services     Objective       06/10/21 0820   Speech / Dysarthria   Comments slow speech rate with precise articulation   Voice   Comments clear   Dysphagia    Dysphagia X   Positioning / Behavior Modification Modulate Rate or Bite Size;Effortful Swallow   Oral / Pharyngeal / Laryngeal Exercises Pharyngeal Constriction Exercises   Diet / Liquid Recommendation Mildly Thick (2) - (Nectar Thick);Soft & Bite-Sized (6) - (Dysphagia III)   Nutritional Liquid Intake Rating Scale Thickened beverages (mildly thick unless otherwise specified)   Nutritional Food Intake Rating Scale Total oral diet with multiple consistencies without special preparation but with specific food limitations   Nursing Communication Swallow  "Precaution Sign Posted at Head of Bed   Skilled Intervention Compensatory Strategies   Recommended Route of Medication Administration   Medication Administration  Float Whole with Puree   Patient / Family Goals   Patient / Family Goal #1 6//10: 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    Goal Outcome  # 3 Progressing as expected   Short Term Goal # 4 6/9/21: Pt will be able to complete 10/10 reps of base of tongue, laryngeal elevation and pharyngeal constriction exercises with min cues and \"good\" accuracy as judged by SLP   Goal Outcome  # 4 Progressing as expected  (pt is self-motivated and is able to utilize written aide)   Interdisciplinary Plan of Care Collaboration   Collaboration Comments Continue with SB6/MT2 diet   Session Information   Date / Session Number 10, 6/10/21 (2/3-6/13)  (FEES 5/26 while trached; decannulated.  ? Repeat FEES pnr, with any difficulty with po)   Priority 3  (3x.Port infect/empyema/trach/decannulated.New diet SB6/MT2CK)         "

## 2021-06-10 NOTE — PROGRESS NOTES
Received bedside report and accepted care of patient.    Pt is currently A/ox4 3L via NC, with no visible or stated sign of distress, discomfort, or SOB. Pt is currently on a soft bite sized diet, tolerating well. Pt is continent of both. Pt has a PICC in place for long term ABX. Pt has staples in place which is okay to remove per MD.    Patient currently resting in bed in no visible or stated signs of distress. Bed in locked and lowest position. Call light and personal possessions within reach. Patient educated about use of call light and verbalized understanding.

## 2021-06-10 NOTE — PROGRESS NOTES
Hospital Medicine Daily Progress Note    Date of Service  6/10/2021    Chief Complaint  50 y.o. female admitted 4/16/2021 with altered mental status    Hospital Course  This is a 50-year-old female with a past medical history of pseudotumor cerebri s/p  shunt implantation by Dr. 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  [facility] infectious disease is managing antibiotics for the patient.  Dr. Candelaria surgery removed the port on 4/12.  The patient was further identified to have endocarditis of the tricuspid valve, a lumbar puncture performed on 4/14 showed pleocytosis with negative Gram stain.  The patient suffered worsening leukocytosis and was found to have septic emboli per CT.  Initially a small pleural catheter was placed.  Significant loculations were encountered.  MSSA empyema was diagnosed.  Neurosurgery was consulted to comment on possibly removal of a  shunt, this was felt not appropriate at this time.  The patient remained on mechanical ventilation for 15 days, then a perc trach was placed.  The patient was eventually liberated from mechanical ventilation, the patient did have a right thoracoscopy and decortication of the lung performed on 4/28 by Dr. Ganser.  The patient was of identified to have multiple sources for MSSA including mitral valve, tricuspid valve vegetation, port, spinal stimulator,  shunt possibly.  Back to the OR for VAT and decortication on 5/17  Trach de-canulated 6/8       Interval Problem Update  Pt states she feels good today.   Has been up and ambulating in the hallway and did stairs as well with  therapy.    Consultants/Specialty  ID    Code Status  Full Code    Disposition  Abx's stop date 6/14  Plan repeat CT chest prior to completion of Abx's    Review of Systems  Review of Systems   Constitutional: Negative for chills and fever.   HENT: Negative for sore throat.    Eyes: Negative for blurred vision and double vision.   Respiratory: Positive for shortness of breath. Negative for cough.    Cardiovascular: Negative for chest pain, palpitations and leg swelling.   Gastrointestinal: Negative for abdominal pain, diarrhea, nausea and vomiting.   Genitourinary: Negative for dysuria and urgency.   Musculoskeletal: Negative for back pain.   Skin: Negative for rash.   Neurological: Negative for dizziness, loss of consciousness and headaches.        Physical Exam  Temp:  [36.2 °C (97.2 °F)-36.7 °C (98 °F)] 36.6 °C (97.9 °F)  Pulse:  [82-95] 85  Resp:  [17-19] 18  BP: ()/(53-61) 100/61  SpO2:  [93 %-99 %] 99 %    Physical Exam  Vitals reviewed.   Constitutional:       General: She is not in acute distress.     Appearance: Normal appearance. She is well-developed. She is not diaphoretic.   HENT:      Head: Normocephalic and atraumatic.   Eyes:      General: No scleral icterus.        Right eye: No discharge.         Left eye: No discharge.   Neck:      Vascular: No JVD.   Cardiovascular:      Rate and Rhythm: Normal rate and regular rhythm.      Heart sounds: Murmur heard.     Pulmonary:      Effort: Pulmonary effort is normal. No respiratory distress.      Breath sounds: No stridor. No wheezing or rales.   Abdominal:      Palpations: Abdomen is soft.      Tenderness: There is no abdominal tenderness. There is no guarding or rebound.   Musculoskeletal:         General: No tenderness.      Right lower leg: No edema.      Left lower leg: No edema.   Skin:     General: Skin is warm and dry.      Findings: No rash.   Neurological:      General: No focal deficit present.      Mental Status: She is alert and oriented  to person, place, and time.   Psychiatric:         Mood and Affect: Mood normal.         Thought Content: Thought content normal.         Fluids    Intake/Output Summary (Last 24 hours) at 6/10/2021 0611  Last data filed at 6/9/2021 1300  Gross per 24 hour   Intake 770 ml   Output --   Net 770 ml       Laboratory      Recent Labs     06/08/21  0530   SODIUM 133*   POTASSIUM 3.9   CHLORIDE 96   CO2 30   GLUCOSE 80   BUN 4*   CREATININE 0.25*   CALCIUM 8.8                   Imaging  DX-CHEST-PORTABLE (1 VIEW)   Final Result      1.  Interval removal of the right-sided chest tubes. No pneumothorax is identified.   2.  Airspace opacities in the right are mildly improved and may represent atelectasis/consolidation.   3.  There is likely a small right pleural effusion.   4.  Patchy opacities on the left are mildly improved.   5.  Interval removal of the enteric tube.      DX-CHEST-PORTABLE (1 VIEW)   Final Result         1. Stable lines and tubes.   2. Stable ill-defined bilateral pulmonary opacities, right worse than left. No large pleural effusions.         DX-CHEST-PORTABLE (1 VIEW)   Final Result      1.  Decrease in volume of right pleural fluid collection/empyema with 2 right chest tubes in place. No pneumothorax identified.      2.  No focal consolidation in the left lung.      DX-CHEST-PORTABLE (1 VIEW)   Final Result      No significant interval change.      DX-CHEST-PORTABLE (1 VIEW)   Final Result         1.  Hazy right pulmonary infiltrates and/or effusion, stable compared to prior study.      DX-CHEST-PORTABLE (1 VIEW)   Final Result         1.  Hazy right pulmonary infiltrates and/or effusion, stable compared to prior study.      DX-CHEST-PORTABLE (1 VIEW)   Final Result      1.  All lines and tubes appear appropriately located      2.  Consolidation throughout the right lung consistent with pneumonia      DX-CHEST-PORTABLE (1 VIEW)   Final Result         1.  Hazy right pulmonary infiltrates and/or effusion,  increased compared to prior study.      DX-ABDOMEN FOR TUBE PLACEMENT   Final Result      Feeding tube looped in the stomach.      DX-CHEST-PORTABLE (1 VIEW)   Final Result         1. Stable lines and tubes. No pneumothorax detected.   2. Persistent opacities in the right perihilar region and in the right lung periphery, similar to prior study. Left lung is essentially clear.      DX-CHEST-PORTABLE (1 VIEW)   Final Result      Postoperative changes of the right chest with placement of an additional chest tube. There is improved aeration in the right lung. No significant pleural effusion or definite pneumothorax.      Right IJ central venous catheter tip projects over the proximal right atrium.      Hypoinflation with interstitial and hazy pulmonary opacities.      DX-CHEST-PORTABLE (1 VIEW)   Final Result         1.  Hazy right pulmonary infiltrates and/or effusion, similar compared to prior study.      DX-ABDOMEN FOR TUBE PLACEMENT   Final Result      Feeding tube tip projects over the expected location the gastric antrum.      DX-CHEST-PORTABLE (1 VIEW)   Final Result         1.  Hazy right pulmonary infiltrates and/or effusion, similar compared to prior study.      DX-CHEST-LIMITED (1 VIEW)   Final Result      1.  No pneumothorax. Only one chest tube is now seen in the right lung.   2.  The chest is otherwise stable.      DX-CHEST-PORTABLE (1 VIEW)   Final Result         1.  Hazy right pulmonary infiltrates and/or effusion, similar compared to prior study.      DX-CHEST-PORTABLE (1 VIEW)   Final Result         1.  Hazy right pulmonary infiltrates and/or effusion, similar compared to prior study.      DX-ABDOMEN FOR TUBE PLACEMENT   Final Result         1.  Nonspecific bowel gas pattern.   2.  Dobbhoff tube tip terminates overlying the expected location of the gastric antrum.   3.  Hazy bilateral lung base infiltrates, greater on the right.      DX-CHEST-PORTABLE (1 VIEW)   Final Result         1.  Hazy right  pulmonary infiltrates and/or effusion, similar compared to prior study.      US-RUQ   Final Result         1.  Hepatomegaly   2.  Mild intrahepatic and extrahepatic biliary ductal dilatation, commonly associated with postcholecystectomy physiology, consider causes of biliary obstruction with additional workup as clinically appropriate      CT-CHEST,ABDOMEN,PELVIS WITH   Final Result      1.  Large empyema in the RIGHT hemithorax, slightly decreased in size from prior exam with chest tubes present.   2.  Consolidation remains.   3.  Multiple cavitary lesions again seen in the LEFT upper lobe, minimally decreased from prior, concerning for septic emboli.   4.  Supportive tubing is unchanged.   5.  Intrahepatic and extrahepatic biliary dilation, likely postoperative although distal stricture, stone or mass remain possibilities.   6.  Moderate pelvic fluid, nonspecific.   7.  No evidence of bowel obstruction or perforation.      DX-CHEST-PORTABLE (1 VIEW)   Final Result         1.  Hazy right pulmonary infiltrates and/or effusion, similar compared to prior study.      DX-CHEST-PORTABLE (1 VIEW)   Final Result         No significant interval change.            DX-CHEST-PORTABLE (1 VIEW)   Final Result         Interval removal of left central venous catheter. Interval adjustment of right PICC with tip in the SVC.         DX-ABDOMEN FOR TUBE PLACEMENT   Final Result      Feeding tube tip at the mid to distal stomach.      IR-PICC LINE PLACEMENT W/ GUIDANCE > AGE 5   Final Result                  Ultrasound-guided PICC placement performed by qualified nursing staff as    above.          DX-CHEST-PORTABLE (1 VIEW)   Final Result      1.  Stable cardiopulmonary findings.   2.  See comments above regarding the right-sided PICC position.      DX-CHEST-PORTABLE (1 VIEW)   Final Result      Stable chest findings compared to 5/5.      DX-CHEST-PORTABLE (1 VIEW)   Final Result      Stable chest findings compared with 5/3.       DX-ABDOMEN FOR TUBE PLACEMENT   Final Result         Feeding tube with tip projecting over the expected area of the stomach.      DX-CHEST-PORTABLE (1 VIEW)   Final Result      Stable chest findings compared with prior.      DX-CHEST-PORTABLE (1 VIEW)   Final Result         1. No significant interval change.      US-EXTREMITY ARTERY LOWER UNILAT RIGHT   Final Result      DX-CHEST-PORTABLE (1 VIEW)   Final Result         1. No significant interval change.      IR-PICC LINE PLACEMENT W/ GUIDANCE > AGE 5   Final Result                  Ultrasound-guided PICC placement performed by qualified nursing staff as    above.          DX-CHEST-PORTABLE (1 VIEW)   Final Result         1.  Pulmonary edema and/or infiltrates, similar to prior study.   2.  Stable opacification right lung, likely effusion. Thoracostomy tubes are in place.   3.  Multiple cavitary appearing left pulmonary lesions, see CT performed April 27, 2021 for further characterization      DX-CHEST-PORTABLE (1 VIEW)   Final Result         1.  Pulmonary edema and/or infiltrates, similar to prior study.   2.  Single opacification right lung, likely effusion. Thoracostomy tubes are in place.   3.  Multiple cavitary appearing left pulmonary lesions, see CT performed April 27, 2021 for further characterization   4.  Soft tissue gas in the right chest wall      DX-CHEST-PORTABLE (1 VIEW)   Final Result         1.  Pulmonary edema and/or infiltrates, similar to prior study.   2.  Increased opacification right lung, likely increased effusion. Thoracostomy tubes are in place.   3.  Multiple cavitary appearing left pulmonary lesions, see CT performed April 27, 2021 for further characterization   4.  Soft tissue gas in the right chest wall      DX-CHEST-PORTABLE (1 VIEW)   Final Result         1.  Pulmonary edema and/or infiltrates, similar to prior study.   2.  Right pneumothorax, stable compared to prior study, right thoracostomy tubes remain in place   3.  Multiple  cavitary appearing left pulmonary lesions, see CT performed April 27, 2021 for further characterization   4.  Soft tissue gas in the right chest wall      DX-CHEST-PORTABLE (1 VIEW)   Final Result         1.  Pulmonary edema and/or infiltrates, similar to prior study.   2.  Right pneumothorax, interval decreased compared to prior study, interval placement of right thoracostomy tubes is noted.   3.  Multiple cavitary appearing left pulmonary lesions, see CT performed yesterday for further characterization   4.  Soft tissue gas in the right chest wall      DX-CHEST-PORTABLE (1 VIEW)   Final Result         1.  Pulmonary edema and/or infiltrates, similar to prior study.   2.  Right pneumothorax, stable compared to prior study, interval removal of right thoracostomy tube is noted.   3.  Multiple cavitary appearing left pulmonary lesions, see CT performed yesterday for further characterization   4.  Soft tissue gas in the right chest wall      DX-CHEST-LIMITED (1 VIEW)   Final Result         No significant interval change.      CT-CTA CHEST PULMONARY ARTERY W/ RECONS   Final Result         No CT evidence of pulmonary embolus.      Previous right chest tube were removed.      Grossly similar in size of the loculated right hydropneumothorax but there is increased layering fluid component. Unchanged mild shift of the mediastinal to the left.      Redemonstration of a pigtail catheter in the medial left lung base. Grossly similar multiple cavitary lesions in the left lung.      US-EXTREMITY VENOUS LOWER BILAT   Final Result      POCT BUTTERFLY-DUPLEX SCAN EXTREMITY VEINS LIMITED   Final Result      DX-CHEST-PORTABLE (1 VIEW)   Final Result         1.  Pulmonary edema and/or infiltrates, similar to prior study.   2.  Right pneumothorax, somewhat decreased to prior study, interval removal of right thoracostomy tube is noted.   3.  Multiple cavitary appearing left pulmonary lesions, see CT performed yesterday for further  characterization   4.  Soft tissue gas in the right chest wall      DX-CHEST-PORTABLE (1 VIEW)   Final Result         1.  Pulmonary edema and/or infiltrates, similar to prior study.   2.  Right pneumothorax, similar to prior study with thoracostomy tube in place.   3.  Multiple cavitary appearing left pulmonary lesions, see CT performed yesterday for further characterization      CT-CHEST (THORAX) W/O   Final Result      Large loculated right hydropneumothorax with interval increase in size of the pneumothorax and pleural fluid.      Right chest tube is in the posterior right thorax.      There is mediastinal shift to the left compatible with tension.      Small pericardial effusion.      Small loculated left pleural effusion with overlying atelectasis/consolidation.   Pigtail catheter is seen at the medial left lung base. Pleural effusion has decreased in size compared to prior.      There are multiple foci of air extending from the right lung to the pleural space suspicious for a bronchopleural fistula.      Multiple cavitary lesions bilaterally with mild interval decrease of the solid component of the cavitary nodules.      Noncavitary 3.1 cm masslike density is seen at the left lung base.      Findings may be related to septic emboli, malignancy or multifocal abscesses. Short interval follow-up recommended.      Soft tissue air on the right is again seen.      Splenomegaly      Findings discussed with Leti Sun.      DX-CHEST-PORTABLE (1 VIEW)   Final Result      No significant change from prior exam.      CT-HEAD W/O   Final Result      1.  RIGHT frontal ventriculostomy catheter unchanged in position.   2.  Mild cortical atrophy.   3.  No intracranial hemorrhage.   4.  Apparent dural thickening overlying the clivus.  Consider further evaluation with contrast-enhanced MRI.      DX-CHEST-LIMITED (1 VIEW)   Final Result      Interval left basilar pigtail catheter with diminished atelectasis, pleural fluid       Stable right hydropneumothorax with thoracostomy tube in place, considerable soft tissue gas and partial lung collapse      IR-CHEST TUBE-EMPYEMA LEFT   Final Result      1.  CT GUIDED LEFT  10 [place] PIGTAIL CHEST TUBE PLACEMENT FOR POSSIBLE EMPYEMA YIELDING OLD BLOODY FLUID.      2. A CHEST RADIOGRAPH IS FORTHCOMING.      EC-MICAH W/O CONT   Final Result      US-ABDOMEN LTD (SOFT TISSUE)   Final Result      No fluid seen surrounding the electronic implanted spinal stimulator device.      DX-CHEST-LIMITED (1 VIEW)   Final Result      1.  Large right hydropneumothorax with right-sided chest tube in place, likely stable to slightly decreased from prior study.   2.  Small left pleural effusion. Mild improved aeration in the left lung.   3.  Bilateral pulmonary opacities which could be related to combination of atelectasis, edema and/or infection..      CT-CTA HEAD WITH & W/O-POST PROCESS   Final Result      1.  Patent carotid and vertebral arteries.      2.  Again seen right frontal the jugular peritoneal shunt catheter. There is mild increase in volume of the lateral and third ventricles.      CT-CHEST (THORAX) W/O   Final Result      1.  Interval placement of a right-sided chest tube into a large loculated right-sided pleural effusion. There is now seen a large right-sided hydropneumothorax in the area that previously seen fluid. The chest tube extends into that hydropneumothorax.    There is some residual pleural fluid seen as well. A hydropneumothorax is somewhat loculated with components most prominently inferiorly and medially.      2.  New loculated left pleural effusion.      3.  Again seen multiple bilateral cavitary pulmonary masses which are more prominent than previously seen with increasing cavitation and measure up to 3 cm size. Differential diagnosis includes septic emboli, multifocal abscesses and neoplasm.      4.  Large amount of subcutaneous emphysema on the right.      5.  Splenomegaly.      6.   Multiple support devices.      Fleischner Society pulmonary nodule recommendations:         DX-CHEST-LIMITED (1 VIEW)   Final Result      1.  Support devices as described above.      2.  Right chest tube present with a again seen right-sided pneumothorax, possibly increased in size and loculated.      3.  Worsening bilateral pulmonary infiltrates.      DX-ABDOMEN FOR TUBE PLACEMENT   Final Result      Cortrak feeding tube tip projects in the region of the duodenal bulb.      DX-CHEST-LIMITED (1 VIEW)   Final Result      Loculated right pneumothorax is decreased in size compared to prior.      Small left pleural effusion.      Small loculated right pleural effusion.      Extensive bilateral airspace opacities are not significantly changed.      Soft tissue air on the right is again noted.      DX-CHEST-LIMITED (1 VIEW)   Final Result      Decreased partially loculated right pleural fluid with increased pleural air. Right chest tube is in place.      Increased hazy opacity in the left lung possibly atelectasis.         US-ABDOMEN LTD (SOFT TISSUE)   Final Result      No drainable fluid collection.      DX-CHEST-PORTABLE (1 VIEW)   Final Result      Decreased partially loculated right pleural fluid with increased pleural air. Right chest tube is in place.      No other significant change.      DX-CHEST-PORTABLE (1 VIEW)   Final Result         1.  Pulmonary edema and/or infiltrates are identified, which are somewhat decreased since the prior exam.   2.  Moderate loculated appearing right pleural effusion, slightly decreased since prior      DX-ABDOMEN FOR TUBE PLACEMENT   Final Result      Feeding tube tip at the distal stomach.      DX-CHEST-PORTABLE (1 VIEW)   Final Result         1.  New chest tube is noted in the right lower hemithorax.      2.  Right pleural effusion is again identified which appears similar to the prior exam.      3.  No new infiltrates or consolidations are identified.      DX-CHEST-PORTABLE (1  VIEW)   Final Result         1.  Pulmonary edema and/or infiltrates are identified, which are stable since the prior exam.   2.  Moderate loculated appearing right pleural effusion      DX-CHEST-PORTABLE (1 VIEW)   Final Result         No significant interval change.      POCT BUTTERFLY-ECHOCARDIOGRAM LTD W/O CONT    (Results Pending)        Assessment/Plan  * Acute bacterial endocarditis- (present on admission)  Assessment & Plan  Mitral valve, tricuspid valve  MSSA  Continue Ancef through 6/14/2021    Vaginal itching  Assessment & Plan  Topical Chlortrimazole    Debility  Assessment & Plan  Acute on chronic  Recovering well: may need rehab vs DC with HH if she cont's to do well    Elevated alkaline phosphatase level- (present on admission)  Assessment & Plan  The patient not able to tolerate a MRCP at this time, ultrasound grossly unremarkable, observe    Agitation requiring sedation protocol- (present on admission)  Assessment & Plan  resolved    Goals of care, counseling/discussion  Assessment & Plan  Patient with overall high risk of morbidity and mortality given her gravely ill state and prior chronic medical conditions  Palliative care following    Spinal cord stimulator status- (present on admission)  Assessment & Plan  Patient's stimulator is [employer]. It is FDA approved as MRI compatible, but the company has gone out of business, so there is no support team to assist with MRI.  Thus, if MRI were performed, our radiologists would need to protocol it correctly to manage the stimulator. The former rep from [employer] is Andre, [phone].  Information obtained from Dr. Mahoney's office, [phone].     Per  (5/4/2021), it is not MRI compatible unfortunately.     Thrombocytosis (HCC)  Assessment & Plan  Acute reaction since resolved    Anasarca- (present on admission)  Assessment & Plan  Improved    Pleural effusion, left  Assessment & Plan  resolved    Trapped lung  Assessment & Plan  Now s/p  decortication   Chest tubes removed on 5/26, observe    Pulmonary abscess (HCC)  Assessment & Plan  From septic emboli  Now s/p decortication    Fever  Assessment & Plan  Resolved, monitor, antibiotic therapy    Pneumonia  Assessment & Plan  MSSA  Ancef per ID through 6/14  Repeat CT Chest pending    History of vasculitis- (present on admission)  Assessment & Plan  Does not appear to be a current issue    Bacteremia due to methicillin susceptible Staphylococcus aureus (MSSA)- (present on admission)  Assessment & Plan  Ancef through 6/14    CSF pleocytosis- (present on admission)  Assessment & Plan  Infectious disease managing    Empyema of right pleural space (HCC)- (present on admission)  Assessment & Plan  Patient s/p multiple chest tubes, thoracotomy and decortication  Now s/p second decortication 5/17  Per thoracic surgery note, sutures to be removed on 6/9  Plan Abx's through 6/14      Acute blood loss anemia  Assessment & Plan  Likely secondary to hemothorax  300 cc of blood removed after chest tube [facility]  Hb has been stable around 9-10    Prolonged Q-T interval on ECG- (present on admission)  Assessment & Plan  Resolved after correcting metabolic issues  Cont to follow   Monitor electrolytes, acidosis etc    Acute respiratory failure with hypoxia (HCC)- (present on admission)  Assessment & Plan  Secondary to MSSA bacteremia, empyema, pulmonary septic emboli  S/p tracheostomy, trach is been capped for more than 24 hours, I discussed with respiratory therapy and evaluated the patient, we agreed decannulation is appropriate  Postacute planning    History of complicated migraines- (present on admission)  Assessment & Plan  Monitor    GERD (gastroesophageal reflux disease)- (present on admission)  Assessment & Plan  History of, as needed treatment    Hyponatremia  Assessment & Plan  Mild - asymptomatic  Monitor    Tachycardia  Assessment & Plan  Persistent, likely secondary with multiple  underlying ongoing conditions  Monitor  Slowly trending down; now 80-90s    Pseudotumor cerebri  Assessment & Plan  History of  shunt, neurosurgery opted against removal    H/O: CVA (cerebrovascular accident)- (present on admission)  Assessment & Plan  Monitor    Chronic pain syndrome- (present on admission)  Assessment & Plan  Pre-existing, pain management    Port or reservoir infection  Assessment & Plan  S/p removal    Thrombocytopenia (HCC)- (present on admission)  Assessment & Plan  Transient, resolved    Anemia- (present on admission)  Assessment & Plan  Monitor, transfuse as indicated    Septic shock (HCC)- (present on admission)  Assessment & Plan  Sepsis is resolved    Sepsis (HCC)  Assessment & Plan  Sepsis has resolved    Hypomagnesemia  Assessment & Plan  Repeat lab tomorrow    Hypokalemia  Assessment & Plan  Monitor, replace and recheck    Anxiety- (present on admission)  Assessment & Plan  Continue Cymbalta and Paxil  As needed Valium    S/P  shunt- (present on admission)  Assessment & Plan  History of, Dr. Oh felt there is currently no need to remove    Folliculitis  Assessment & Plan  Improved    Acute encephalopathy- (present on admission)  Assessment & Plan  Per neurosurgery no need to remove  shunt  Improved         VTE prophylaxis: enoxaparin

## 2021-06-10 NOTE — DISCHARGE PLANNING
Medical Social Work    Anticipated Discharge Disposition:  Home w/ Home Health    Action:  Pt discussed in IDT rounds.  PT/OT/SLP recommending HH at this time.  Pt still requiring IV Abx until 6/14/2021.  ID following and will make recommendations.     Barriers to Discharge: IV ABX until 6/14/2021.     Plan:  Pt to remain in hospital until 6/14/2021 to complete IV abx course.

## 2021-06-10 NOTE — PROGRESS NOTES
Infectious Disease Daily Progress Note    Date of Service  6/10/2021    Chief Complaint  Cefazolin 5/18-19, 5/26-present Target 6/14/21  Total abx therapy D#58     Thoracoscopy & Decortication - 4/28  Decortication 5/17     PRIOR Rx:   Zosyn 4/22-4/23  Previous: Unasyn, Zosyn, Vanco, Daptomycin  Ceftaroline: start 4/13-4/15  Nafcillin 2g Q4 hrs 4/15-4/23      Meropenem 5/19-5/26   Cefepime 4/23 5/18 4/14: Unable to give name of previous NSG or neurologist. Seems confused, but able to say some sentences fluently.   4/15: Line cultures now growing MSSA. As well as from the blood. Repeat blood culture 4/13+ in both sets. Patient has worsening confusion. CSF fluid analyses suggest pleocytosis. Cultures are no growth from the CSF. Chest CT was ordered this morning indicating a loculated right pleural effusion as well as bilateral septic emboli. Dr. Mack spoke with Dr. Oh yesterday and no surgical intervention unless clinical deterioration.  4/16: Increased respiratory distress.  Tachypneic.  CT with loculated collection.  Dr Resendiz not available.  No thoracic surgeon available.  Plans to have pulmonary place CT.  Transferring to ICU.  4/17: Transferred to [facility] last night. Hgb dropped to 6.4 requiring PRBC transfusion. Requiring 2 pressors. Fio2 50%. Febrile 38.8. Pending OR decortication of empyema and hemothorax. Chest tube placed at [facility] shows 8,371 WBC, ,000, 74% N  4/18: Chest tube was upsized by surgery yesterday, no decortication. WBC 22k. Fio2 40%. Vasopressin. Levophed down to 2mcg. Afebrile 24 hrs. Last fever 38.6 on 4/16.   4/19: Still on vent. Levo 3 mcg, vasopressin. Afebrile 72 hours. WBC 21k. BC 4/17 NGTD x 48 hours. Unable to do MRI brain given neurostimulator per RN.   4/20: Remaining afebrile. Hb 6.2 and undergoing transfusion today.WBC 24,100, 5% bands.1700 ml CT output. Copious bloody ET secretions. No pressors. Receiving dornase and TPA per CT. Right pleural effusion not large per  CXR today.   4/21: WBC 31k. Bronchoscopy yesterday showing blood. Cultures sent. TPA on hold per RN due to arvind blood from chest tube requiring PRBC transfusion for hgb 6. Pending chest CT today. Per , spinal stimulator is non-MRI compatible. Levophed 10mcg. 30% Fio2. Afebrile.   4/22: Fever 101.3 this am. WBC 20,300 down from 32,200 yesterday. BAL growing Klebsiella pneumoniae but susceptibility panel not set up until today. CTA of brain shows no lesion. CT of chest shows enlarging cavitary lung lesions bilat and large loculated new left pleural effusion and large hydropneumothorax on right. TPA & dornase infusions of right chest ended 4/20 after considerable bleeding requiring transfusion. Hb now down again to 6.8.  4/23: WBC 23k. Fio2 40%. Tmax 38.1. US of stiumlator shows small fluid collection but no drainable abscess. Pending MICAH today. Pending L chest tube placement  4/24: Continue fever 100.8-101.8. WBC 16,600. MICAH shows large vegetations on both tricuspid valve and mitral valve with mild TR & MR.  No aortic root abscess. Left chest tube placed yesterday yielding 8 ml of old bloody fluid. Bronch today revealed copious thick maroon secretions in distal trachea and both mainstem bronchi. On the right these were obstructive. Remaining off pressors. Last positive blood cultures (MSSA) were 4/16, negative 4/17.  4/25: Fever 100.6 this AM. wBC 13,300. Hb 6.6. CT: right hydropneumothorax increasing, right bronchopleural fistula, mediastinal shift ot the left , multiple cavitary pulmonary nodules, 3.1 cm left basilar mass, splenomegaly. CT of head shows dural thickening over the clivus. Lac+ GNR >100,000 col/ml growing from BAL of 4/24, presumed Klebsiella. Left peural fluid culture negative from 4/23.  4/26: Fever 100.4 last night. WBC 13,500. Hb 7.4. Plts 502,000. ESR >120. UA fairly clear except 30 mg% protein, 2-5 wbc and rare rbc. CXR unchanged except more prominent pulmonary edema. GNR in BAL may be a  different species of Klebsiella, and resistant to ampicillin & tmp-sulfa; confirmation pending.  4/28: Fever 100.8 last night. WBC 27,700. Hb 6.6. Plts 482,000. Creat 0.19. Kleb pneum in BAL on 4/24 is resistant to ampicillin & tmp-sulfa but otherwise sensitive. O2 sat 96% on FIO2 30% now, PEEP 8. Has been re-evaluated by thoracic surgeon, Dr. Ganser, who believes she will not wean without decortication on the right. Surgery anticipated today.  4/29: S/P right thoracoscopy with decortication with cultures NG at 24 hrs.  4/30: Had a bronch due to hypoxia and hypotension. CXR with worsening right hemithorax pulmonary opacities. Bloody mucous plugs removed. Pressors started. Febrile this am. Tachy in 130s. Pressors just removed. Tolerating vent, but not a SBT candidate at the moment.  5/1: Small air leak CT-2 every ~ 2/3 breathes. The K. pneumoniae from her thoracoscopy is sensitive to her cefepime so no antibiotic change needed.   5/2: No air leak today she tolerated 2  hrs of spontaneous breathing with support today.      5/3: Second day with SBT and doing well now for 2 hrs. Slight bump in temperature and      WBC's but no obvious clinical infectious changed so watch closely.      5/4: Fever still from time to time. WBCs trending up. Chest tubes in place. Trach.       5/5: She clearly is better today she was sitting up in bed with open eyes and follows commands. She still has low grade fever but her WBC's are dropping. Cefepime dose increased yesterday for increased CNS coverage if necessary. She will probably need further thoracic surgery as mentioned by Dr. Ganser. The issue of what to do with her stimulator is still up in the air for now as it probably will need to be removed but she is nort a candidate for more major surgery at this time.       5/7: She continues to improve and under go longer SBT trials. She just had a new PICC placed in her right arm and plan is to D/C central line.        5/8: PICC placed. No  fevers. Comfortable. Cortrak placed. Failing SBTs.  5/9: No acute issues. No fevers. Comfortable. Family at bedside.   5/10: No acute issues, fevers or WBC bumps. No changes in condition. She is to get her CT scan today and be reviewed by surgery  5/11:  at bedside.  Has been referred to LANE.  Repeat CT scan completed.  Results noted.  ? If Dr Ganser has seen.  Still with an empyema:  ? If candidate for further surgery.  5/12: Pending next thoracic surgery. No fever. Anxious.  5/13: ? Surgery date.  No one seems to know.  Had speaking valve in and having swallow eval with speech therapy  5/14: No acute issues. No fevers. Surgery Monday.  5/15: One CT accidentally pulled out yesterday.  Has been on T piece and tolerating. Plans for surgery Monday afternoon.  5/16: On schedule for surgery 5/17: 4:30 pm.  Moved to room T614.  No new issues.  5/17: Was sitting up in the bedside chair since change of shift.  Now walking in hallways with RN and CNA.  5/18: Decortication yesterday with 2 chest tube placement. WBC improving 21->14k  5/19: Hypotension and tachycardia.  Decreased H/H.  Placed back on vent to rest.  Dr Lozano in process of placing new line.  Antibiotics were deesculated yesterday to Cefazolin.  No cx were taken at surgery.  5/20: Pt was febrile yest afternoon 38.2, but now afebrile overnight after meropenem. WBC improved 11k->8k. Fio2 30%. Phenylephrine now off this morning. CXR shows new R consolidations.   5/21: Off pressors.  On vent: FiO2 30%, PEEP 8, PS 5 Received pRBCs yesterday.  5/22: Remains afebrile, off pressors. FiO2 30%.   5/23: Afebrile. FiO2 30%. Bronchoscopy yesterday normal.   5/24: No new cultures obtained at last bronchoscopy.  On  T piece this am: FiO2 30%.  5/25: Remaining afebrile. WBC 8600.   5/26: Chest tube removed today. Possible transfer to [facility].  5/27: O2 sat usuallyl 95% but intermittent desat to 87-89%.  Remaining afebrile.WBC 6100. Alk phos 641 but normal transaminases.    5/28: Pending [facility] transfer. NO new issues overnight  5/29: Awaiting bed at [facility]. Remaining afebrile. WBC 5800, creat 0.19.  5/31. Remaining afebrile. WBC 6000. . Remains weak and frail. Alk Phos 694 with normal transaminases. Slightly improved bilateral patchy infiltrates.   6/1: She is anxious to move on but still waiting for insurance approval to [facility].  6/2: Still awaiting bed at [facility]. Remaining afebrile. WBC 8300. Alk Phos 705 but transaminases normal and bilirubine 0.4.  6/4: Awaiting insurance auth before bed can be assigned at [facility]. Remaining afebrile. Extensive folliculitis over back and buttocks.  WBC 8300 on 6/2. Last  on 5/31.  6/5: Her ESR is down to 53 from 102 a good sign hopefully. She continues to work on her strength by walking.  6/6: Speech evaluation to check her swallow as she is stronger and can't stand pureed.  6/7: [facility] was denied by insurance. Could she go to full rehab now? Swallow eval pending.clotrimazole for vaginal drainage and itch.  6/8: Continued to be declined by insurances. No issues clinically. Physical Med declined for rehab. No fevers.  6/9: OT will recommend home PT/OT for her once he finishes her antibiotics.  6/10: Per pt and CM and OT, patient does not need SNF anymore may go straight home.     Review of Systems  Review of Systems   All other systems reviewed and are negative.       Physical Exam  Temp:  [36.1 °C (97 °F)-36.7 °C (98 °F)] 36.1 °C (97 °F)  Pulse:  [82-97] 96  Resp:  [18-20] 20  BP: ()/(53-61) 97/58  SpO2:  [93 %-99 %] 99 %    Physical Exam  Constitutional:       Appearance: Normal appearance.   HENT:      Head: Normocephalic.   Cardiovascular:      Rate and Rhythm: Normal rate and regular rhythm.      Heart sounds: No murmur heard.     Pulmonary:      Effort: Pulmonary effort is normal.      Comments: Diminished R base  Abdominal:      General: Abdomen is flat. Bowel sounds are normal. There is no distension.      Tenderness: There is no  abdominal tenderness.   Skin:     General: Skin is warm.      Coloration: Skin is not jaundiced.   Neurological:      Mental Status: She is alert.         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       - Acute left empyema s/p chest tube placement 4/23, decortication 5/17, chest tube removal 5/26 - Klebsiella  -Multiple acute septic pulmonary emboli bilaterally  -Acute bilateral pneumonia due to above     --Pulmonary edema  -Pseudotumor cerebri with underlying  shunt: possible arachnoiditis  -Chronic migraine headaches  -History of autoimmune vasculitis  -Elevated alkaline phosphatase & bilirubin  -Acute encephalopathy due to sepsis      - Anemia due to above      - Acute fever on 5/19 likely from secondary VAP      - Secondary VAP R sided pneumonia      - decubitus ulcer sacral.      Plan   Patient's abx completion date coming up soon. Will need repeat Chest CT and repeat ECHO prior to antibiotic completion. This will determine stopping IV abx therapy versus transitioning to PO therapy  Has been on total therapy for 58 days now   Continue cefazolin - 4 week target date 6/14/21 - started after decortication  Aggressive PT  Repeat CT of chest prior to stopping abx given complicated infiltrative dx.  Will need suppression after therapy for her HW to include spinal stimulator,  shunt          - Given MSSA - Keflex appropriate  No  changes to therapy today  Dispo: go home after IV therapy completion    45 min spent. Case discussed with patient, and CM

## 2021-06-10 NOTE — DISCHARGE PLANNING
Agency/Facility Name: [facility]  Outcome: This DPA left vmail for Aayush. Awaiting a call back.

## 2021-06-10 NOTE — CARE PLAN
The patient is Stable - Low risk of patient condition declining or worsening    Shift Goals  Clinical Goals: O2 saturations above 95%, pain control  Family Goals: Patient to ambulate    Progress made toward(s) clinical / shift goals: Patient is on 2 liters oxygen via nasal cannula and is satting in mid to high 90s. Patient is assessed for pain regularly and medicated per MAR. At beginning of shift the patient's  expressed he wanted to ambulate with the patient. The patient was able to walk 3 laps around the unit with her  using a front wheel walker and an oxygen tank.     Patient is not progressing towards the following goals: N/A.

## 2021-06-10 NOTE — PROGRESS NOTES
Received report from day shift RN and assumed care of patient.   Pt resting in bed with  at bedside, A&Ox4, on 2 liters oxygen via nasal cannula, VSS.   Assessment completed, POC discussed.   Reports generalized pain; prn Oxy 5 given.  Scheduled medications given per MAR.   Bed is in lowest, locked position, call bell and belongings are in reach.   Hourly rounding and safety precautions in place.   No further needs at this time.    0115- Pt request humidifer for oxygen. Pt's oxygen flow increased from 2 liters to 3 liters per RT's recommendation. Whit from RT added humidifier and removed high flow oxygen and trach equipment from bedside.

## 2021-06-10 NOTE — THERAPY
Physical Therapy   Daily Treatment     Patient Name: Enedelia Pang  Age:  50 y.o., Sex:  female  Medical Record #: [id number]  Today's Date: 6/10/2021     Precautions: Swallow Precautions ( See Comments)    Assessment    Rec'd pt alert, in bed.  She had been seen by PT and subsequently d/c'd as she was able to ambulate w/ spv.  She continues to ambulate w/ nsg.  PT to see pt for stair training in hopes of pt returning home.  Today, she is rec'd alert, in bed, agreeable to work w/ PT.  She is able to move in/out of bed.  She is able to ambulate both w/ and w/o the 4ww, but the fww would allow for increased fxl mobility w/ the opportunity to sit and rest if needed.  Apparently, her  will be checking Care Chest for a 4ww for home use.  Today, she is able to complete a full flight of stairs, 20 steps in total, w/o assist and w/o needing to rest.  PT will again defer ambulation to nsg, as she is at a spv level.  PT will again be available for d/c needs.    DC Equipment Recommendations: Unable to determine at this time  Discharge Recommendations: Recommend home health for continued physical therapy services        Objective       06/10/21 0802   Balance   Sitting Balance (Static) Fair +   Sitting Balance (Dynamic) Fair +   Standing Balance (Static) Fair   Standing Balance (Dynamic) Fair   Weight Shift Sitting Good   Weight Shift Standing Good   Comments w/ and w/o 4ww   Gait Analysis   Gait Level Of Assist Supervised   Assistive Device Front Wheel Walker   Distance (Feet) 150   # of Stairs Climbed 20   Level of Assist with Stairs Supervised   Bed Mobility    Supine to Sit Supervised   Sit to Supine Supervised   Scooting Supervised   Rolling Supervised   Functional Mobility   Sit to Stand Supervised   Bed, Chair, Wheelchair Transfer Supervised   Short Term Goals    Short Term Goal # 1 Pt will be able to perform supine<>sit with HOB flat/no rails with Spv in 6tx to promote fnx progression towards  I    Goal Outcome #  1 Goal met   Short Term Goal # 2 Pt will be able to perform sit<>stand/transfers with FWW with SPv in 6tx to promote fnx progression towards I    Goal Outcome # 2 Goal met   Short Term Goal # 3 Pt will be able to ambulate 150ft with FWW iwth SPv in 6tx to promote fnx progression towards I    Goal Outcome # 3 Goal met   Short Term Goal # 4 Pt will be able to ambulate up/down FOS with rail wtih Spv in 6tx to promote eventual dc to home plan   Goal Outcome # 4 Goal met   Anticipated Discharge Equipment and Recommendations   Discharge Recommendations Recommend home health for continued physical therapy services

## 2021-06-11 PROCEDURE — 700111 HCHG RX REV CODE 636 W/ 250 OVERRIDE (IP): Performed by: STUDENT IN AN ORGANIZED HEALTH CARE EDUCATION/TRAINING PROGRAM

## 2021-06-11 PROCEDURE — 770006 HCHG ROOM/CARE - MED/SURG/GYN SEMI*

## 2021-06-11 PROCEDURE — A9270 NON-COVERED ITEM OR SERVICE: HCPCS | Performed by: HOSPITALIST

## 2021-06-11 PROCEDURE — 700111 HCHG RX REV CODE 636 W/ 250 OVERRIDE (IP): Performed by: INTERNAL MEDICINE

## 2021-06-11 PROCEDURE — 700102 HCHG RX REV CODE 250 W/ 637 OVERRIDE(OP): Performed by: HOSPITALIST

## 2021-06-11 PROCEDURE — 94760 N-INVAS EAR/PLS OXIMETRY 1: CPT

## 2021-06-11 PROCEDURE — A9270 NON-COVERED ITEM OR SERVICE: HCPCS | Performed by: INTERNAL MEDICINE

## 2021-06-11 PROCEDURE — 700102 HCHG RX REV CODE 250 W/ 637 OVERRIDE(OP): Performed by: INTERNAL MEDICINE

## 2021-06-11 PROCEDURE — 99232 SBSQ HOSP IP/OBS MODERATE 35: CPT | Performed by: HOSPITALIST

## 2021-06-11 RX ADMIN — DIVALPROEX SODIUM 500 MG: 125 CAPSULE ORAL at 21:57

## 2021-06-11 RX ADMIN — RISPERIDONE 2 MG: 2 TABLET ORAL at 17:03

## 2021-06-11 RX ADMIN — CEFAZOLIN SODIUM 2 G: 2 INJECTION, SOLUTION INTRAVENOUS at 05:48

## 2021-06-11 RX ADMIN — RISPERIDONE 2 MG: 2 TABLET ORAL at 05:56

## 2021-06-11 RX ADMIN — DOCUSATE SODIUM 50 MG AND SENNOSIDES 8.6 MG 2 TABLET: 8.6; 5 TABLET, FILM COATED ORAL at 05:55

## 2021-06-11 RX ADMIN — DIPHENHYDRAMINE HYDROCHLORIDE 50 MG: 50 INJECTION INTRAMUSCULAR; INTRAVENOUS at 09:48

## 2021-06-11 RX ADMIN — METHADONE HYDROCHLORIDE 30 MG: 10 TABLET ORAL at 05:56

## 2021-06-11 RX ADMIN — OXYCODONE 5 MG: 5 TABLET ORAL at 09:47

## 2021-06-11 RX ADMIN — CEFAZOLIN SODIUM 2 G: 2 INJECTION, SOLUTION INTRAVENOUS at 22:09

## 2021-06-11 RX ADMIN — DIPHENHYDRAMINE HYDROCHLORIDE 50 MG: 50 INJECTION INTRAMUSCULAR; INTRAVENOUS at 21:57

## 2021-06-11 RX ADMIN — DIVALPROEX SODIUM 500 MG: 125 CAPSULE ORAL at 13:30

## 2021-06-11 RX ADMIN — OXYCODONE 5 MG: 5 TABLET ORAL at 21:57

## 2021-06-11 RX ADMIN — OXYCODONE 5 MG: 5 TABLET ORAL at 15:14

## 2021-06-11 RX ADMIN — DOCUSATE SODIUM 50 MG AND SENNOSIDES 8.6 MG 2 TABLET: 8.6; 5 TABLET, FILM COATED ORAL at 17:03

## 2021-06-11 RX ADMIN — DIPHENHYDRAMINE HYDROCHLORIDE 50 MG: 50 INJECTION INTRAMUSCULAR; INTRAVENOUS at 03:18

## 2021-06-11 RX ADMIN — CLOTRIMAZOLE 1 APPLICATOR: 1 CREAM VAGINAL at 22:17

## 2021-06-11 RX ADMIN — DULOXETINE HYDROCHLORIDE 60 MG: 60 CAPSULE, DELAYED RELEASE ORAL at 05:55

## 2021-06-11 RX ADMIN — ACETAMINOPHEN 500 MG: 325 TABLET ORAL at 21:58

## 2021-06-11 RX ADMIN — DULOXETINE HYDROCHLORIDE 60 MG: 60 CAPSULE, DELAYED RELEASE ORAL at 17:03

## 2021-06-11 RX ADMIN — GABAPENTIN 300 MG: 300 CAPSULE ORAL at 21:57

## 2021-06-11 RX ADMIN — DIAZEPAM 5 MG: 5 TABLET ORAL at 23:32

## 2021-06-11 RX ADMIN — OXYCODONE 5 MG: 5 TABLET ORAL at 03:18

## 2021-06-11 RX ADMIN — GABAPENTIN 300 MG: 300 CAPSULE ORAL at 05:55

## 2021-06-11 RX ADMIN — CEFAZOLIN SODIUM 2 G: 2 INJECTION, SOLUTION INTRAVENOUS at 13:30

## 2021-06-11 RX ADMIN — PAROXETINE HYDROCHLORIDE 10 MG: 10 TABLET, FILM COATED ORAL at 05:56

## 2021-06-11 RX ADMIN — GABAPENTIN 300 MG: 300 CAPSULE ORAL at 13:30

## 2021-06-11 RX ADMIN — DIPHENHYDRAMINE HYDROCHLORIDE 50 MG: 50 INJECTION INTRAMUSCULAR; INTRAVENOUS at 15:14

## 2021-06-11 RX ADMIN — DIVALPROEX SODIUM 500 MG: 125 CAPSULE ORAL at 05:55

## 2021-06-11 RX ADMIN — ENOXAPARIN SODIUM 40 MG: 40 INJECTION SUBCUTANEOUS at 05:56

## 2021-06-11 ASSESSMENT — ENCOUNTER SYMPTOMS
DIARRHEA: 0
DOUBLE VISION: 0
NAUSEA: 0
HEADACHES: 0
DIZZINESS: 0
COUGH: 0
FEVER: 0
CONSTIPATION: 0
BLURRED VISION: 0
SORE THROAT: 0
VOMITING: 0
PALPITATIONS: 0
SHORTNESS OF BREATH: 0
LOSS OF CONSCIOUSNESS: 0
ABDOMINAL PAIN: 0
CHILLS: 0
BACK PAIN: 0

## 2021-06-11 ASSESSMENT — PAIN DESCRIPTION - PAIN TYPE
TYPE: ACUTE PAIN

## 2021-06-11 NOTE — PROGRESS NOTES
Infectious Disease Progress Note    Author: Brian Omer M.D. Date & Time created: 6/11/2021  2:33 PM     Cefazolin 5/18-19, 5/26-present Target 6/14/21  Total abx therapy Day #59     Thoracoscopy & Decortication - 4/28  Decortication 5/17     PRIOR Rx:   Zosyn 4/22-4/23  Previous: Unasyn, Zosyn, Vanco, Daptomycin  Ceftaroline: start 4/13-4/15  Nafcillin 2g Q4 hrs 4/15-4/23      Meropenem 5/19-5/26   Cefepime 4/23 5/18 4/14: Unable to give name of previous NSG or neurologist. Seems confused, but able to say some sentences fluently.   4/15: Line cultures now growing MSSA. As well as from the blood. Repeat blood culture 4/13+ in both sets. Patient has worsening confusion. CSF fluid analyses suggest pleocytosis. Cultures are no growth from the CSF. Chest CT was ordered this morning indicating a loculated right pleural effusion as well as bilateral septic emboli. Dr. Mack spoke with Dr. Oh yesterday and no surgical intervention unless clinical deterioration.  4/16: Increased respiratory distress.  Tachypneic.  CT with loculated collection.  Dr Resendiz not available.  No thoracic surgeon available.  Plans to have pulmonary place CT.  Transferring to ICU.  4/17: Transferred to [facility] last night. Hgb dropped to 6.4 requiring PRBC transfusion. Requiring 2 pressors. Fio2 50%. Febrile 38.8. Pending OR decortication of empyema and hemothorax. Chest tube placed at [facility] shows 8,371 WBC, ,000, 74% N  4/18: Chest tube was upsized by surgery yesterday, no decortication. WBC 22k. Fio2 40%. Vasopressin. Levophed down to 2mcg. Afebrile 24 hrs. Last fever 38.6 on 4/16.   4/19: Still on vent. Levo 3 mcg, vasopressin. Afebrile 72 hours. WBC 21k. BC 4/17 NGTD x 48 hours. Unable to do MRI brain given neurostimulator per RN.   4/20: Remaining afebrile. Hb 6.2 and undergoing transfusion today.WBC 24,100, 5% bands.1700 ml CT output. Copious bloody ET secretions. No pressors. Receiving dornase and TPA per CT. Right pleural  effusion not large per CXR today.   4/21: WBC 31k. Bronchoscopy yesterday showing blood. Cultures sent. TPA on hold per RN due to arvind blood from chest tube requiring PRBC transfusion for hgb 6. Pending chest CT today. Per , spinal stimulator is non-MRI compatible. Levophed 10mcg. 30% Fio2. Afebrile.   4/22: Fever 101.3 this am. WBC 20,300 down from 32,200 yesterday. BAL growing Klebsiella pneumoniae but susceptibility panel not set up until today. CTA of brain shows no lesion. CT of chest shows enlarging cavitary lung lesions bilat and large loculated new left pleural effusion and large hydropneumothorax on right. TPA & dornase infusions of right chest ended 4/20 after considerable bleeding requiring transfusion. Hb now down again to 6.8.  4/23: WBC 23k. Fio2 40%. Tmax 38.1. US of stiumlator shows small fluid collection but no drainable abscess. Pending MICAH today. Pending L chest tube placement  4/24: Continue fever 100.8-101.8. WBC 16,600. MICAH shows large vegetations on both tricuspid valve and mitral valve with mild TR & MR.  No aortic root abscess. Left chest tube placed yesterday yielding 8 ml of old bloody fluid. Bronch today revealed copious thick maroon secretions in distal trachea and both mainstem bronchi. On the right these were obstructive. Remaining off pressors. Last positive blood cultures (MSSA) were 4/16, negative 4/17.  4/25: Fever 100.6 this AM. wBC 13,300. Hb 6.6. CT: right hydropneumothorax increasing, right bronchopleural fistula, mediastinal shift ot the left , multiple cavitary pulmonary nodules, 3.1 cm left basilar mass, splenomegaly. CT of head shows dural thickening over the clivus. Lac+ GNR >100,000 col/ml growing from BAL of 4/24, presumed Klebsiella. Left peural fluid culture negative from 4/23.  4/26: Fever 100.4 last night. WBC 13,500. Hb 7.4. Plts 502,000. ESR >120. UA fairly clear except 30 mg% protein, 2-5 wbc and rare rbc. CXR unchanged except more prominent pulmonary  edema. GNR in BAL may be a different species of Klebsiella, and resistant to ampicillin & tmp-sulfa; confirmation pending.  4/28: Fever 100.8 last night. WBC 27,700. Hb 6.6. Plts 482,000. Creat 0.19. Kleb pneum in BAL on 4/24 is resistant to ampicillin & tmp-sulfa but otherwise sensitive. O2 sat 96% on FIO2 30% now, PEEP 8. Has been re-evaluated by thoracic surgeon, Dr. Ganser, who believes she will not wean without decortication on the right. Surgery anticipated today.  4/29: S/P right thoracoscopy with decortication with cultures NG at 24 hrs.  4/30: Had a bronch due to hypoxia and hypotension. CXR with worsening right hemithorax pulmonary opacities. Bloody mucous plugs removed. Pressors started. Febrile this am. Tachy in 130s. Pressors just removed. Tolerating vent, but not a SBT candidate at the moment.  5/1: Small air leak CT-2 every ~ 2/3 breathes. The K. pneumoniae from her thoracoscopy is sensitive to her cefepime so no antibiotic change needed.   5/2: No air leak today she tolerated 2  hrs of spontaneous breathing with support today.      5/3: Second day with SBT and doing well now for 2 hrs. Slight bump in temperature and      WBC's but no obvious clinical infectious changed so watch closely.      5/4: Fever still from time to time. WBCs trending up. Chest tubes in place. Trach.       5/5: She clearly is better today she was sitting up in bed with open eyes and follows commands. She still has low grade fever but her WBC's are dropping. Cefepime dose increased yesterday for increased CNS coverage if necessary. She will probably need further thoracic surgery as mentioned by Dr. Ganser. The issue of what to do with her stimulator is still up in the air for now as it probably will need to be removed but she is nort a candidate for more major surgery at this time.       5/7: She continues to improve and under go longer SBT trials. She just had a new PICC placed in her right arm and plan is to D/C central line.         5/8: PICC placed. No fevers. Comfortable. Cortrak placed. Failing SBTs.  5/9: No acute issues. No fevers. Comfortable. Family at bedside.   5/10: No acute issues, fevers or WBC bumps. No changes in condition. She is to get her CT scan today and be reviewed by surgery  5/11:  at bedside.  Has been referred to LANE.  Repeat CT scan completed.  Results noted.  ? If Dr Ganser has seen.  Still with an empyema:  ? If candidate for further surgery.  5/12: Pending next thoracic surgery. No fever. Anxious.  5/13: ? Surgery date.  No one seems to know.  Had speaking valve in and having swallow eval with speech therapy  5/14: No acute issues. No fevers. Surgery Monday.  5/15: One CT accidentally pulled out yesterday.  Has been on T piece and tolerating. Plans for surgery Monday afternoon.  5/16: On schedule for surgery 5/17: 4:30 pm.  Moved to room T614.  No new issues.  5/17: Was sitting up in the bedside chair since change of shift.  Now walking in hallways with RN and CNA.  5/18: Decortication yesterday with 2 chest tube placement. WBC improving 21->14k  5/19: Hypotension and tachycardia.  Decreased H/H.  Placed back on vent to rest.  Dr Lozano in process of placing new line.  Antibiotics were deesculated yesterday to Cefazolin.  No cx were taken at surgery.  5/20: Pt was febrile yest afternoon 38.2, but now afebrile overnight after meropenem. WBC improved 11k->8k. Fio2 30%. Phenylephrine now off this morning. CXR shows new R consolidations.   5/21: Off pressors.  On vent: FiO2 30%, PEEP 8, PS 5 Received pRBCs yesterday.  5/22: Remains afebrile, off pressors. FiO2 30%.   5/23: Afebrile. FiO2 30%. Bronchoscopy yesterday normal.   5/24: No new cultures obtained at last bronchoscopy.  On  T piece this am: FiO2 30%.  5/25: Remaining afebrile. WBC 8600.   5/26: Chest tube removed today. Possible transfer to [facility].  5/27: O2 sat usuallyl 95% but intermittent desat to 87-89%.  Remaining afebrile.WBC 6100. Alk phos 641  but normal transaminases.   5/28: Pending [facility] transfer. NO new issues overnight  5/29: Awaiting bed at [facility]. Remaining afebrile. WBC 5800, creat 0.19.  5/31. Remaining afebrile. WBC 6000. . Remains weak and frail. Alk Phos 694 with normal transaminases. Slightly improved bilateral patchy infiltrates.   6/1: She is anxious to move on but still waiting for insurance approval to [facility].  6/2: Still awaiting bed at [facility]. Remaining afebrile. WBC 8300. Alk Phos 705 but transaminases normal and bilirubine 0.4.  6/4: Awaiting insurance auth before bed can be assigned at [facility]. Remaining afebrile. Extensive folliculitis over back and buttocks.  WBC 8300 on 6/2. Last  on 5/31.  6/5: Her ESR is down to 53 from 102 a good sign hopefully. She continues to work on her strength by walking.  6/6: Speech evaluation to check her swallow as she is stronger and can't stand pureed.  6/7: [facility] was denied by insurance. Could she go to full rehab now? Swallow eval pending.clotrimazole for vaginal drainage and itch.  6/8: Continued to be declined by insurances. No issues clinically. Physical Med declined for rehab. No fevers.  6/9: OT will recommend home PT/OT for her once he finishes her antibiotics.  6/10: Per pt and CM and OT, patient does not need SNF anymore may go straight home.   6/11: He repeat CXR shows an 8X6 fluid collection we will see if IR can drain the fluid before she is D/C'd.    Interval History:  Past 24 hrs reviewed with RN    Labs Reviewed, Medications Reviewed, Radiology Reviewed, Wound Reviewed, Fluids Reviewed and GI Nutrition Reviewed    Review of Systems:  Review of Systems   Constitutional: Negative for chills and fever.   Respiratory: Negative for cough and shortness of breath.    Cardiovascular: Negative for chest pain.   Gastrointestinal: Negative for abdominal pain, constipation, diarrhea, nausea and vomiting.   Genitourinary: Negative for dysuria.   Skin: Positive for itching (better).       Physical  Exam:  Physical Exam  Vitals and nursing note reviewed.   Constitutional:       General: She is not in acute distress.     Appearance: She is not ill-appearing, toxic-appearing or diaphoretic.   HENT:      Head: Normocephalic and atraumatic.   Cardiovascular:      Rate and Rhythm: Normal rate and regular rhythm.      Heart sounds: No murmur heard.     Pulmonary:      Effort: Pulmonary effort is normal. No respiratory distress.      Breath sounds: No wheezing or rhonchi.   Abdominal:      General: There is no distension.      Palpations: Abdomen is soft.      Tenderness: There is no abdominal tenderness. There is no guarding.   Skin:     General: Skin is warm and dry.   Neurological:      Mental Status: She is alert and oriented to person, place, and time.   Psychiatric:         Behavior: Behavior normal.      Comments: Flat affect with occasional smiles.         Labs:  No results found for this or any previous visit (from the past 24 hour(s)).    Hemodynamics:  Temp (24hrs), Av.6 °C (97.8 °F), Min:36.4 °C (97.6 °F), Max:36.7 °C (98 °F)  Temperature: 36.7 °C (98 °F)  Pulse  Av  Min: 40  Max: 220   Blood Pressure: (!) 99/56 (nurse aware)    PICC Double Lumen 21 Lumen 1: Purple Lumen 2: Red Right Brachial (Active)   Site Assessment Clean;Dry;Intact 21   Lumen 1 Status Scrubbed the hub prior to access;Flushed;Normal saline locked 21   Lumen 2 Status Scrubbed the hub prior to access;Flushed;Normal saline locked 21   Line Secured at (cm) 1 cm 21 1118   Extremity Circumference (cm) 30.5 cm 21 1118   Dressing Type Biopatch;Occlusive;Securing device;Transparent 21   Dressing Status Clean;Dry;Intact 21   Dressing Intervention N/A 21   Dressing Change Due 06/12/21 06/10/21 2145   Date Primary Tubing Changed 21 0800   Date Secondary Tubing Changed 06/10/21 06/09/21 0800   Date IV Connector(s) Changed 21  2100   NEXT Primary Tubing Change  06/12/21 06/09/21 0800   NEXT Secondary Tubing Change  06/10/21 06/09/21 0800   NEXT IV Connector(s) Change 06/12/21 06/09/21 0800   Line Necessity Assessed Antibiotic Therapy Greater than 7 Days 06/09/21 0800   $ Double Lumen PICC Charge Single kit used 05/07/21 1118     Wound:  @WOUNDLDA(4)@     Fluids:  Intake/Output                             06/09/21 0700 - 06/10/21 0659 06/10/21 0700 - 06/11/21 0659 06/11/21 0700 - 06/12/21 0659     [id number] 9268-6076 Total 8949-7188 6727-4511 Total 6281-5261 6951-7498 Total                    Intake    P.O.  720  -- 720  960  -- 960  480  -- 480    P.O. 720 -- 720 960 -- 960 480 -- 480    Other  50  -- 50  --  -- --  --  -- --    Medications (PO/Enteral Liquids) 50 -- 50 -- -- -- -- -- --    Total Intake 770 -- 770 960 -- 960 480 -- 480       Output    Urine  --  -- --  --  -- --  --  -- --    Number of Times Voided 2 x -- 2 x -- -- -- -- -- --    Stool  --  -- --  --  -- --  --  -- --    Number of Times Stooled 1 x -- 1 x -- -- -- -- -- --    Total Output -- -- -- -- -- -- -- -- --       Net I/O     770 -- 770 960 -- 960 480 -- 480           GI/Nutrition:  Orders Placed This Encounter   Procedures   • Diet Order Diet: Level 6 - Soft and Bite Sized; Liquid level: Level 2 - Mildly Thick; Tray Modifications (optional): SLP - Deliver to Nursing Station, SLP - 1:1 Supervision by Nursing     Standing Status:   Standing     Number of Occurrences:   1     Order Specific Question:   Diet:     Answer:   Level 6 - Soft and Bite Sized [23]     Order Specific Question:   Liquid level     Answer:   Level 2 - Mildly Thick     Order Specific Question:   Tray Modifications (optional)     Answer:   SLP - Deliver to Nursing Station     Order Specific Question:   Tray Modifications (optional)     Answer:   SLP - 1:1 Supervision by Nursing   • Diet NPO     Standing Status:   Standing     Number of Occurrences:   8     Order Specific Question:   Restrict to:      Answer:   Ice Chips [2]     Medications:  Current Facility-Administered Medications   Medication Last Admin   • clotrimazole (LOTRIMIN) 1 % vaginal cream 1 Applicator 1 Applicator at 06/10/21 2116   • diphenhydrAMINE (BENADRYL) injection 50 mg 50 mg at 06/11/21 0948   • acetaminophen (TYLENOL) tablet 500 mg 500 mg at 06/10/21 2106   • divalproex (DEPAKOTE SPRINKLE) capsule 500 mg 500 mg at 06/11/21 1330   • DULoxetine (CYMBALTA) capsule 60 mg 60 mg at 06/11/21 0555   • gabapentin (NEURONTIN) capsule 300 mg 300 mg at 06/11/21 1330   • methadone (DOLOPHINE) tablet 30 mg 30 mg at 06/11/21 0556   • PARoxetine (PAXIL) tablet 10 mg 10 mg at 06/11/21 0556   • risperiDONE (RISPERDAL) tablet 2 mg 2 mg at 06/11/21 0556   • senna-docusate (PERICOLACE or SENOKOT S) 8.6-50 MG per tablet 2 tablet 2 tablet at 06/11/21 0555    And   • polyethylene glycol/lytes (MIRALAX) PACKET 1 Packet      And   • magnesium hydroxide (MILK OF MAGNESIA) suspension 30 mL      And   • bisacodyl (DULCOLAX) suppository 10 mg     • acetaminophen (Tylenol) tablet 650 mg 650 mg at 05/30/21 0457   • diazePAM (VALIUM) tablet 5 mg 5 mg at 06/10/21 2116   • oxyCODONE immediate-release (ROXICODONE) tablet 5 mg 5 mg at 06/11/21 0947   • ceFAZolin in dextrose (ANCEF) IVPB premix 2 g Stopped at 06/11/21 1400   • enoxaparin (LOVENOX) inj 40 mg 40 mg at 06/11/21 0556   • Respiratory Therapy Consult     • ondansetron (ZOFRAN) syringe/vial injection 4 mg 4 mg at 06/02/21 0703   • labetalol (NORMODYNE/TRANDATE) injection 10 mg 10 mg at 05/04/21 0226   • ipratropium-albuterol (DUONEB) nebulizer solution       Medical Decision Making, by Problem:  Active Hospital Problems    Diagnosis    • *Acute bacterial endocarditis [I33.0]    • Vaginal itching [N89.8]    • Debility [R53.81]    • Elevated alkaline phosphatase level [R74.8]    • Agitation requiring sedation protocol [R45.1]    • Spinal cord stimulator status [Z96.89]    • Goals of care, counseling/discussion  [Z71.89]    • Fever [R50.9]    • Pulmonary abscess (HCC) [J85.2]    • 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0]    • Pseudotumor cerebri [G93.2]    • H/O: CVA (cerebrovascular accident) [Z86.73]    • Chronic pain syndrome [G89.4]    • Port or reservoir infection [T80.212A]    • Thrombocytopenia (HCC) [D69.6]    • Septic shock (HCC) [A41.9, R65.21]    • Anemia [D64.9]    • Sepsis (HCC) [A41.9]    • Hypokalemia [E87.6]    • Hypomagnesemia [E83.42]    • S/P  shunt [Z98.2]    • Anxiety [F41.9]    • Folliculitis [L73.9]    • Acute encephalopathy [G93.40]      Impression   -Severe sepsis  -Catheter related bloodstream infection due to infected port status post removal 4/12-staph aureus  -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       - Acute left empyema s/p chest tube placement 4/23, decortication 5/17, chest tube removal 5/26 - Klebsiella  -Multiple acute septic pulmonary emboli bilaterally  -Acute bilateral pneumonia due to above     --Pulmonary edema  -Pseudotumor cerebri with underlying  shunt: possible arachnoiditis  -Chronic migraine headaches  -History of autoimmune vasculitis  -Elevated alkaline phosphatase & bilirubin  -Acute encephalopathy due to sepsis      - Anemia due to above      - Acute fever on 5/19 likely from  secondary VAP      - Secondary VAP R sided pneumonia      - decubitus ulcer sacral.      Plan   Patient's abx completion date coming up soon. Will need repeat Chest CT and repeat ECHO prior to antibiotic completion. This will determine stopping IV abx therapy versus transitioning to PO therapy  Has been on total therapy for 58 days now   Continue cefazolin - 4 week target date 6/14/21 - started after decortication  Aggressive PT  Repeat CT of chest prior to stopping abx given complicated infiltrative dx.  Will need suppression after therapy for her HW to include spinal stimulator,  shunt          - Given MSSA - Keflex appropriate  No changes to therapy today  Dispo: go home after IV therapy completion    He repeat CXR shows an 8X6 fluid collection we will see if IR can drain the fluid before she is D/C'd.  Case and treatment reviewed with patient, mj, RN and Dr. Gonzalez 30 min on floor with  > 50% face to face view and 100% in direct patient care/counseling/consulting today.

## 2021-06-11 NOTE — PROGRESS NOTES
Received bedside report and accepted care of patient.    Pt is currently A/ox4 2L via NC, with no visible or stated sign of distress, discomfort, or SOB. Pt is currently on a soft bite sized diet, tolerating well. Pt is continent of both. Pt has a PICC in place for long term ABX.    Patient currently resting in bed in no visible or stated signs of distress. Bed in locked and lowest position. Call light and personal possessions within reach. Patient educated about use of call light and verbalized understanding.

## 2021-06-11 NOTE — CARE PLAN
The patient is Stable - Low risk of patient condition declining or worsening    Shift Goals  Clinical Goals: pain management; pt will sleep comfortably   Patient Goals: Pain   Family Goals: NA    Problem: Fall Risk  Goal: Patient will remain free from falls  Outcome: Progressing     Problem: Knowledge Deficit - Standard  Goal: Patient and family/care givers will demonstrate understanding of plan of care, disease process/condition, diagnostic tests and medications  Outcome: Progressing     Problem: Pain Management  Goal: Pain level will decrease to patient's comfort goal  Outcome: Progressing       Progress made toward(s) clinical / shift goals: administered pain medication per MAR. Pt reports decrease in pain. Fall precautions in place, pt remains free from falls at this time.     Patient is not progressing towards the following goals:

## 2021-06-11 NOTE — CARE PLAN
The patient is Stable - Low risk of patient condition declining or worsening    Shift Goals  Clinical Goals: Pain management (Pain management)  Patient Goals: Pain   Family Goals: NA    Progress made toward(s) clinical / shift goals:    Problem: Pain Management  Goal: Pain level will decrease to patient's comfort goal  Outcome: Progressing  Note: PRN medications available as a therapeutic support for pain management      Patient is not progressing towards the following goals:

## 2021-06-11 NOTE — PROGRESS NOTES
Pt is A&Ox4. Pt is resting in bed, no signs of labored breathing or pain. Pt on 2 L NC. Call light & personal belongings within reach, bed in lowest position & locked. Pt refuses bed alarm.  Fall precautions in place and education provided on how to use call light. Pt updated on plan of care for the shift. Pt declines any additional needs at this time.

## 2021-06-11 NOTE — PROGRESS NOTES
Hospital Medicine Daily Progress Note    Date of Service  6/11/2021    Chief Complaint  50 y.o. female admitted 4/16/2021 with altered mental status    Hospital Course  This is a 50-year-old female with a past medical history of pseudotumor cerebri s/p  shunt implantation by Dr. 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  [facility] infectious disease is managing antibiotics for the patient.  Dr. Candelaria surgery removed the port on 4/12.  The patient was further identified to have endocarditis of the tricuspid valve, a lumbar puncture performed on 4/14 showed pleocytosis with negative Gram stain.  The patient suffered worsening leukocytosis and was found to have septic emboli per CT.  Initially a small pleural catheter was placed.  Significant loculations were encountered.  MSSA empyema was diagnosed.  Neurosurgery was consulted to comment on possibly removal of a  shunt, this was felt not appropriate at this time.  The patient remained on mechanical ventilation for 15 days, then a perc trach was placed.  The patient was eventually liberated from mechanical ventilation, the patient did have a right thoracoscopy and decortication of the lung performed on 4/28 by Dr. Ganser.  The patient was of identified to have multiple sources for MSSA including mitral valve, tricuspid valve vegetation, port, spinal stimulator,  shunt possibly.  Back to the OR for VAT and decortication on 5/17  Trach de-canulated 6/8       Interval Problem Update  No complaints  Eager to go home; anticipated Mon after completion of  Abxs    AFebrile    Consultants/Specialty  ID    Code Status  Full Code    Disposition  Abx's stop date 6/14  Plan repeat CT chest prior to completion of Abx's    Review of Systems  Review of Systems   Constitutional: Negative for chills and fever.   HENT: Negative for sore throat.    Eyes: Negative for blurred vision and double vision.   Respiratory: Negative for cough and shortness of breath.    Cardiovascular: Negative for chest pain, palpitations and leg swelling.   Gastrointestinal: Negative for abdominal pain, diarrhea, nausea and vomiting.   Genitourinary: Negative for dysuria and urgency.   Musculoskeletal: Negative for back pain.   Skin: Negative for rash.   Neurological: Negative for dizziness, loss of consciousness and headaches.        Physical Exam  Temp:  [36.1 °C (97 °F)-36.6 °C (97.8 °F)] 36.4 °C (97.6 °F)  Pulse:  [] 88  Resp:  [16-20] 16  BP: ()/(56-72) 117/72  SpO2:  [91 %-100 %] 99 %    Physical Exam  Vitals reviewed.   Constitutional:       General: She is not in acute distress.     Appearance: Normal appearance. She is well-developed. She is not diaphoretic.   HENT:      Head: Normocephalic and atraumatic.   Eyes:      General: No scleral icterus.        Right eye: No discharge.         Left eye: No discharge.   Neck:      Vascular: No JVD.   Cardiovascular:      Rate and Rhythm: Normal rate and regular rhythm.      Heart sounds: Murmur heard.     Pulmonary:      Effort: Pulmonary effort is normal. No respiratory distress.      Breath sounds: No stridor. No wheezing or rales.   Abdominal:      Palpations: Abdomen is soft.   Musculoskeletal:         General: No tenderness.      Right lower leg: No edema.      Left lower leg: No edema.   Skin:     General: Skin is warm and dry.      Findings: No rash.   Neurological:      General: No focal deficit present.      Mental Status: She is alert and oriented to person, place, and time.   Psychiatric:         Mood and Affect: Mood normal.          Thought Content: Thought content normal.         Fluids    Intake/Output Summary (Last 24 hours) at 6/11/2021 0626  Last data filed at 6/10/2021 1337  Gross per 24 hour   Intake 960 ml   Output --   Net 960 ml       Laboratory                        Imaging  CT-CHEST (THORAX) WITH   Final Result      1.  Near complete resolution of multifocal bilateral pulmonary airspace process consistent with pneumonia      2.  Loculated right anterior and anterior/lateral rim-enhancing pleural fluid collection consistent with an empyema. Extent is from just below the lung apex to the diaphragm and the largest component measures 8.0 x 6.0 cm transversely      3.  Ill-defined left upper lobe pulmonary nodule which are most likely postinflammatory      4.  Hepatic steatosis and hepatomegaly                  DX-CHEST-PORTABLE (1 VIEW)   Final Result      1.  Interval removal of the right-sided chest tubes. No pneumothorax is identified.   2.  Airspace opacities in the right are mildly improved and may represent atelectasis/consolidation.   3.  There is likely a small right pleural effusion.   4.  Patchy opacities on the left are mildly improved.   5.  Interval removal of the enteric tube.      DX-CHEST-PORTABLE (1 VIEW)   Final Result         1. Stable lines and tubes.   2. Stable ill-defined bilateral pulmonary opacities, right worse than left. No large pleural effusions.         DX-CHEST-PORTABLE (1 VIEW)   Final Result      1.  Decrease in volume of right pleural fluid collection/empyema with 2 right chest tubes in place. No pneumothorax identified.      2.  No focal consolidation in the left lung.      DX-CHEST-PORTABLE (1 VIEW)   Final Result      No significant interval change.      DX-CHEST-PORTABLE (1 VIEW)   Final Result         1.  Hazy right pulmonary infiltrates and/or effusion, stable compared to prior study.      DX-CHEST-PORTABLE (1 VIEW)   Final Result         1.  Hazy right pulmonary infiltrates and/or effusion,  stable compared to prior study.      DX-CHEST-PORTABLE (1 VIEW)   Final Result      1.  All lines and tubes appear appropriately located      2.  Consolidation throughout the right lung consistent with pneumonia      DX-CHEST-PORTABLE (1 VIEW)   Final Result         1.  Hazy right pulmonary infiltrates and/or effusion, increased compared to prior study.      DX-ABDOMEN FOR TUBE PLACEMENT   Final Result      Feeding tube looped in the stomach.      DX-CHEST-PORTABLE (1 VIEW)   Final Result         1. Stable lines and tubes. No pneumothorax detected.   2. Persistent opacities in the right perihilar region and in the right lung periphery, similar to prior study. Left lung is essentially clear.      DX-CHEST-PORTABLE (1 VIEW)   Final Result      Postoperative changes of the right chest with placement of an additional chest tube. There is improved aeration in the right lung. No significant pleural effusion or definite pneumothorax.      Right IJ central venous catheter tip projects over the proximal right atrium.      Hypoinflation with interstitial and hazy pulmonary opacities.      DX-CHEST-PORTABLE (1 VIEW)   Final Result         1.  Hazy right pulmonary infiltrates and/or effusion, similar compared to prior study.      DX-ABDOMEN FOR TUBE PLACEMENT   Final Result      Feeding tube tip projects over the expected location the gastric antrum.      DX-CHEST-PORTABLE (1 VIEW)   Final Result         1.  Hazy right pulmonary infiltrates and/or effusion, similar compared to prior study.      DX-CHEST-LIMITED (1 VIEW)   Final Result      1.  No pneumothorax. Only one chest tube is now seen in the right lung.   2.  The chest is otherwise stable.      DX-CHEST-PORTABLE (1 VIEW)   Final Result         1.  Hazy right pulmonary infiltrates and/or effusion, similar compared to prior study.      DX-CHEST-PORTABLE (1 VIEW)   Final Result         1.  Hazy right pulmonary infiltrates and/or effusion, similar compared to prior study.       DX-ABDOMEN FOR TUBE PLACEMENT   Final Result         1.  Nonspecific bowel gas pattern.   2.  Dobbhoff tube tip terminates overlying the expected location of the gastric antrum.   3.  Hazy bilateral lung base infiltrates, greater on the right.      DX-CHEST-PORTABLE (1 VIEW)   Final Result         1.  Hazy right pulmonary infiltrates and/or effusion, similar compared to prior study.      US-RUQ   Final Result         1.  Hepatomegaly   2.  Mild intrahepatic and extrahepatic biliary ductal dilatation, commonly associated with postcholecystectomy physiology, consider causes of biliary obstruction with additional workup as clinically appropriate      CT-CHEST,ABDOMEN,PELVIS WITH   Final Result      1.  Large empyema in the RIGHT hemithorax, slightly decreased in size from prior exam with chest tubes present.   2.  Consolidation remains.   3.  Multiple cavitary lesions again seen in the LEFT upper lobe, minimally decreased from prior, concerning for septic emboli.   4.  Supportive tubing is unchanged.   5.  Intrahepatic and extrahepatic biliary dilation, likely postoperative although distal stricture, stone or mass remain possibilities.   6.  Moderate pelvic fluid, nonspecific.   7.  No evidence of bowel obstruction or perforation.      DX-CHEST-PORTABLE (1 VIEW)   Final Result         1.  Hazy right pulmonary infiltrates and/or effusion, similar compared to prior study.      DX-CHEST-PORTABLE (1 VIEW)   Final Result         No significant interval change.            DX-CHEST-PORTABLE (1 VIEW)   Final Result         Interval removal of left central venous catheter. Interval adjustment of right PICC with tip in the SVC.         DX-ABDOMEN FOR TUBE PLACEMENT   Final Result      Feeding tube tip at the mid to distal stomach.      IR-PICC LINE PLACEMENT W/ GUIDANCE > AGE 5   Final Result                  Ultrasound-guided PICC placement performed by qualified nursing staff as    above.          DX-CHEST-PORTABLE (1 VIEW)    Final Result      1.  Stable cardiopulmonary findings.   2.  See comments above regarding the right-sided PICC position.      DX-CHEST-PORTABLE (1 VIEW)   Final Result      Stable chest findings compared to 5/5.      DX-CHEST-PORTABLE (1 VIEW)   Final Result      Stable chest findings compared with 5/3.      DX-ABDOMEN FOR TUBE PLACEMENT   Final Result         Feeding tube with tip projecting over the expected area of the stomach.      DX-CHEST-PORTABLE (1 VIEW)   Final Result      Stable chest findings compared with prior.      DX-CHEST-PORTABLE (1 VIEW)   Final Result         1. No significant interval change.      US-EXTREMITY ARTERY LOWER UNILAT RIGHT   Final Result      DX-CHEST-PORTABLE (1 VIEW)   Final Result         1. No significant interval change.      IR-PICC LINE PLACEMENT W/ GUIDANCE > AGE 5   Final Result                  Ultrasound-guided PICC placement performed by qualified nursing staff as    above.          DX-CHEST-PORTABLE (1 VIEW)   Final Result         1.  Pulmonary edema and/or infiltrates, similar to prior study.   2.  Stable opacification right lung, likely effusion. Thoracostomy tubes are in place.   3.  Multiple cavitary appearing left pulmonary lesions, see CT performed April 27, 2021 for further characterization      DX-CHEST-PORTABLE (1 VIEW)   Final Result         1.  Pulmonary edema and/or infiltrates, similar to prior study.   2.  Single opacification right lung, likely effusion. Thoracostomy tubes are in place.   3.  Multiple cavitary appearing left pulmonary lesions, see CT performed April 27, 2021 for further characterization   4.  Soft tissue gas in the right chest wall      DX-CHEST-PORTABLE (1 VIEW)   Final Result         1.  Pulmonary edema and/or infiltrates, similar to prior study.   2.  Increased opacification right lung, likely increased effusion. Thoracostomy tubes are in place.   3.  Multiple cavitary appearing left pulmonary lesions, see CT performed April 27, 2021  for further characterization   4.  Soft tissue gas in the right chest wall      DX-CHEST-PORTABLE (1 VIEW)   Final Result         1.  Pulmonary edema and/or infiltrates, similar to prior study.   2.  Right pneumothorax, stable compared to prior study, right thoracostomy tubes remain in place   3.  Multiple cavitary appearing left pulmonary lesions, see CT performed April 27, 2021 for further characterization   4.  Soft tissue gas in the right chest wall      DX-CHEST-PORTABLE (1 VIEW)   Final Result         1.  Pulmonary edema and/or infiltrates, similar to prior study.   2.  Right pneumothorax, interval decreased compared to prior study, interval placement of right thoracostomy tubes is noted.   3.  Multiple cavitary appearing left pulmonary lesions, see CT performed yesterday for further characterization   4.  Soft tissue gas in the right chest wall      DX-CHEST-PORTABLE (1 VIEW)   Final Result         1.  Pulmonary edema and/or infiltrates, similar to prior study.   2.  Right pneumothorax, stable compared to prior study, interval removal of right thoracostomy tube is noted.   3.  Multiple cavitary appearing left pulmonary lesions, see CT performed yesterday for further characterization   4.  Soft tissue gas in the right chest wall      DX-CHEST-LIMITED (1 VIEW)   Final Result         No significant interval change.      CT-CTA CHEST PULMONARY ARTERY W/ RECONS   Final Result         No CT evidence of pulmonary embolus.      Previous right chest tube were removed.      Grossly similar in size of the loculated right hydropneumothorax but there is increased layering fluid component. Unchanged mild shift of the mediastinal to the left.      Redemonstration of a pigtail catheter in the medial left lung base. Grossly similar multiple cavitary lesions in the left lung.      US-EXTREMITY VENOUS LOWER BILAT   Final Result      POCT BUTTERFLY-DUPLEX SCAN EXTREMITY VEINS LIMITED   Final Result      DX-CHEST-PORTABLE (1  VIEW)   Final Result         1.  Pulmonary edema and/or infiltrates, similar to prior study.   2.  Right pneumothorax, somewhat decreased to prior study, interval removal of right thoracostomy tube is noted.   3.  Multiple cavitary appearing left pulmonary lesions, see CT performed yesterday for further characterization   4.  Soft tissue gas in the right chest wall      DX-CHEST-PORTABLE (1 VIEW)   Final Result         1.  Pulmonary edema and/or infiltrates, similar to prior study.   2.  Right pneumothorax, similar to prior study with thoracostomy tube in place.   3.  Multiple cavitary appearing left pulmonary lesions, see CT performed yesterday for further characterization      CT-CHEST (THORAX) W/O   Final Result      Large loculated right hydropneumothorax with interval increase in size of the pneumothorax and pleural fluid.      Right chest tube is in the posterior right thorax.      There is mediastinal shift to the left compatible with tension.      Small pericardial effusion.      Small loculated left pleural effusion with overlying atelectasis/consolidation.   Pigtail catheter is seen at the medial left lung base. Pleural effusion has decreased in size compared to prior.      There are multiple foci of air extending from the right lung to the pleural space suspicious for a bronchopleural fistula.      Multiple cavitary lesions bilaterally with mild interval decrease of the solid component of the cavitary nodules.      Noncavitary 3.1 cm masslike density is seen at the left lung base.      Findings may be related to septic emboli, malignancy or multifocal abscesses. Short interval follow-up recommended.      Soft tissue air on the right is again seen.      Splenomegaly      Findings discussed with Leti Sun.      DX-CHEST-PORTABLE (1 VIEW)   Final Result      No significant change from prior exam.      CT-HEAD W/O   Final Result      1.  RIGHT frontal ventriculostomy catheter unchanged in position.   2.   Mild cortical atrophy.   3.  No intracranial hemorrhage.   4.  Apparent dural thickening overlying the clivus.  Consider further evaluation with contrast-enhanced MRI.      DX-CHEST-LIMITED (1 VIEW)   Final Result      Interval left basilar pigtail catheter with diminished atelectasis, pleural fluid      Stable right hydropneumothorax with thoracostomy tube in place, considerable soft tissue gas and partial lung collapse      IR-CHEST TUBE-EMPYEMA LEFT   Final Result      1.  CT GUIDED LEFT  10 [place] PIGTAIL CHEST TUBE PLACEMENT FOR POSSIBLE EMPYEMA YIELDING OLD BLOODY FLUID.      2. A CHEST RADIOGRAPH IS FORTHCOMING.      EC-MICAH W/O CONT   Final Result      US-ABDOMEN LTD (SOFT TISSUE)   Final Result      No fluid seen surrounding the electronic implanted spinal stimulator device.      DX-CHEST-LIMITED (1 VIEW)   Final Result      1.  Large right hydropneumothorax with right-sided chest tube in place, likely stable to slightly decreased from prior study.   2.  Small left pleural effusion. Mild improved aeration in the left lung.   3.  Bilateral pulmonary opacities which could be related to combination of atelectasis, edema and/or infection..      CT-CTA HEAD WITH & W/O-POST PROCESS   Final Result      1.  Patent carotid and vertebral arteries.      2.  Again seen right frontal the jugular peritoneal shunt catheter. There is mild increase in volume of the lateral and third ventricles.      CT-CHEST (THORAX) W/O   Final Result      1.  Interval placement of a right-sided chest tube into a large loculated right-sided pleural effusion. There is now seen a large right-sided hydropneumothorax in the area that previously seen fluid. The chest tube extends into that hydropneumothorax.    There is some residual pleural fluid seen as well. A hydropneumothorax is somewhat loculated with components most prominently inferiorly and medially.      2.  New loculated left pleural effusion.      3.  Again seen multiple bilateral  cavitary pulmonary masses which are more prominent than previously seen with increasing cavitation and measure up to 3 cm size. Differential diagnosis includes septic emboli, multifocal abscesses and neoplasm.      4.  Large amount of subcutaneous emphysema on the right.      5.  Splenomegaly.      6.  Multiple support devices.      Fleischner Society pulmonary nodule recommendations:         DX-CHEST-LIMITED (1 VIEW)   Final Result      1.  Support devices as described above.      2.  Right chest tube present with a again seen right-sided pneumothorax, possibly increased in size and loculated.      3.  Worsening bilateral pulmonary infiltrates.      DX-ABDOMEN FOR TUBE PLACEMENT   Final Result      Cortrak feeding tube tip projects in the region of the duodenal bulb.      DX-CHEST-LIMITED (1 VIEW)   Final Result      Loculated right pneumothorax is decreased in size compared to prior.      Small left pleural effusion.      Small loculated right pleural effusion.      Extensive bilateral airspace opacities are not significantly changed.      Soft tissue air on the right is again noted.      DX-CHEST-LIMITED (1 VIEW)   Final Result      Decreased partially loculated right pleural fluid with increased pleural air. Right chest tube is in place.      Increased hazy opacity in the left lung possibly atelectasis.         US-ABDOMEN LTD (SOFT TISSUE)   Final Result      No drainable fluid collection.      DX-CHEST-PORTABLE (1 VIEW)   Final Result      Decreased partially loculated right pleural fluid with increased pleural air. Right chest tube is in place.      No other significant change.      DX-CHEST-PORTABLE (1 VIEW)   Final Result         1.  Pulmonary edema and/or infiltrates are identified, which are somewhat decreased since the prior exam.   2.  Moderate loculated appearing right pleural effusion, slightly decreased since prior      DX-ABDOMEN FOR TUBE PLACEMENT   Final Result      Feeding tube tip at the distal  stomach.      DX-CHEST-PORTABLE (1 VIEW)   Final Result         1.  New chest tube is noted in the right lower hemithorax.      2.  Right pleural effusion is again identified which appears similar to the prior exam.      3.  No new infiltrates or consolidations are identified.      DX-CHEST-PORTABLE (1 VIEW)   Final Result         1.  Pulmonary edema and/or infiltrates are identified, which are stable since the prior exam.   2.  Moderate loculated appearing right pleural effusion      DX-CHEST-PORTABLE (1 VIEW)   Final Result         No significant interval change.      POCT BUTTERFLY-ECHOCARDIOGRAM LTD W/O CONT    (Results Pending)   EC-ECHOCARDIOGRAM COMPLETE W/ CONT    (Results Pending)        Assessment/Plan  * Acute bacterial endocarditis- (present on admission)  Assessment & Plan  Mitral valve, tricuspid valve  MSSA  Continue Ancef through 6/14/2021    Vaginal itching  Assessment & Plan  Topical Chlortrimazole    Debility  Assessment & Plan  Acute on chronic  Recovering well: may need rehab vs DC with HH if she cont's to do well    Elevated alkaline phosphatase level- (present on admission)  Assessment & Plan  The patient not able to tolerate a MRCP at this time, ultrasound grossly unremarkable, observe    Agitation requiring sedation protocol- (present on admission)  Assessment & Plan  resolved    Goals of care, counseling/discussion  Assessment & Plan  Patient with overall high risk of morbidity and mortality given her gravely ill state and prior chronic medical conditions  Palliative care following    Spinal cord stimulator status- (present on admission)  Assessment & Plan  Patient's stimulator is Nuvectra. It is FDA approved as MRI compatible, but the company has gone out of business, so there is no support team to assist with MRI.  Thus, if MRI were performed, our radiologists would need to protocol it correctly to manage the stimulator. The former rep from [employer] is Andre, [phone].  Information  obtained from Dr. Mahoney's office, [phone].     Per  (5/4/2021), it is not MRI compatible unfortunately.     Thrombocytosis (HCC)  Assessment & Plan  Acute reaction since resolved    Anasarca- (present on admission)  Assessment & Plan  Improved    Pleural effusion, left  Assessment & Plan  resolved    Trapped lung  Assessment & Plan  Now s/p decortication   Chest tubes removed on 5/26, observe    Pulmonary abscess (HCC)  Assessment & Plan  From septic emboli  Now s/p decortication    Fever  Assessment & Plan  Resolved, monitor, antibiotic therapy    Pneumonia  Assessment & Plan  MSSA  Ancef per ID through 6/14  Repeat CT Chest showing some residual empyema: have DW ID and will plan to tap.  Prefer not to go back to the OR given the pt's prolonged and complex hospital course    History of vasculitis- (present on admission)  Assessment & Plan  Does not appear to be a current issue    Bacteremia due to methicillin susceptible Staphylococcus aureus (MSSA)- (present on admission)  Assessment & Plan  Ancef through 6/14    CSF pleocytosis- (present on admission)  Assessment & Plan  Infectious disease managing    Empyema of right pleural space (HCC)- (present on admission)  Assessment & Plan  Patient s/p multiple chest tubes, thoracotomy and decortication  Now s/p second decortication 5/17  Per thoracic surgery note, sutures to be removed on 6/9  Plan Abx's through 6/14      Acute blood loss anemia  Assessment & Plan  Likely secondary to hemothorax  300 cc of blood removed after chest tube [facility] at [facility]  Hb has been stable around 9-10    Prolonged Q-T interval on ECG- (present on admission)  Assessment & Plan  Resolved after correcting metabolic issues  Cont to follow   Monitor electrolytes, acidosis etc    Acute respiratory failure with hypoxia (HCC)- (present on admission)  Assessment & Plan  Secondary to MSSA bacteremia, empyema, pulmonary septic emboli  S/p tracheostomy, trach is been capped for  more than 24 hours, I discussed with respiratory therapy and evaluated the patient, we agreed decannulation is appropriate  Postacute planning    History of complicated migraines- (present on admission)  Assessment & Plan  Monitor    GERD (gastroesophageal reflux disease)- (present on admission)  Assessment & Plan  History of, as needed treatment    Hyponatremia  Assessment & Plan  Mild - asymptomatic  Monitor    Tachycardia  Assessment & Plan  Persistent, likely secondary with multiple underlying ongoing conditions  Monitor  Slowly trending down; now 80-90s    Pseudotumor cerebri  Assessment & Plan  History of  shunt, neurosurgery opted against removal    H/O: CVA (cerebrovascular accident)- (present on admission)  Assessment & Plan  Monitor    Chronic pain syndrome- (present on admission)  Assessment & Plan  Pre-existing, pain management    Port or reservoir infection  Assessment & Plan  S/p removal    Thrombocytopenia (HCC)- (present on admission)  Assessment & Plan  Transient, resolved    Anemia- (present on admission)  Assessment & Plan  Monitor, transfuse as indicated    Septic shock (HCC)- (present on admission)  Assessment & Plan  Sepsis is resolved    Sepsis (HCC)  Assessment & Plan  Sepsis has resolved    Hypomagnesemia  Assessment & Plan  Repeat lab tomorrow    Hypokalemia  Assessment & Plan  Monitor, replace and recheck    Anxiety- (present on admission)  Assessment & Plan  Continue Cymbalta and Paxil  As needed Valium    S/P  shunt- (present on admission)  Assessment & Plan  History of, Dr. Oh felt there is currently no need to remove    Folliculitis  Assessment & Plan  Improved    Acute encephalopathy- (present on admission)  Assessment & Plan  Per neurosurgery no need to remove  shunt  Improved         VTE prophylaxis: enoxaparin

## 2021-06-11 NOTE — PROGRESS NOTES
Assessment/description of ears? Intact, pink, blanching  Which preventative measures are in place for the ears? Gray foam padding on the nasal cannula    Assessment/description of elbows? Intact, pink, blanching  Which preventative measures are in place for the elbows?     Assessment/description of sacrum? Red, excoriated, peeling  Which preventative measures are in place for the sacrum? Nystatin powder    Assessment/description of heels? Intact, pink, slightly boggy  Which preventative measures are in place for the heels? Pt encouraged to turn    Which devices are in place?nasal cannula, PICC line  Description of skin under devices: c/d/i  Which preventative measures are in place under devices? Weekly dressing changes    Other: pressure injury to the great toe on right foot, and 3rd toe of left foot  Removed staples from the island dressing of the rib cage, open to air

## 2021-06-11 NOTE — DISCHARGE PLANNING
Received Choice form at 1250  Agency/Facility Name: [facility]  Referral not sent. No Home Health order present    LSW Anil Reece notified    Received Choice form at 1250  Agency/Facility Name: [facility]  Referral sent per Choice form @ 1254

## 2021-06-11 NOTE — PROGRESS NOTES
Assessment/description of ears? Intact, pink, blanching   Which preventative measures are in place for the ears?  -grey foam to oxygen tubing, pt removes glasses while sleeping     Assessment/description of elbows? Intact, pink, blanching   Which preventative measures are in place for the elbows?  -pillows in use for support/offloading     Assessment/description of sacrum? Red, rashy, excoriated, blanching   Which preventative measures are in place for the sacrum?  -Barrier paste, encourage frequent repositions     Assessment/description of heels? Red, slightly boggy, blanching   Which preventative measures are in place for the heels?  -encourage pt to wear mepilex, pt refuses, pillows to offload heels     Which devices are in place? NC, glasses, PICC   Description of skin under devices: clean/dry/intact  Which preventative measures are in place under devices?  -pt removes glasses while resting     Other:  DTI to right great toe. Wound to left 3rd toe.

## 2021-06-12 ENCOUNTER — APPOINTMENT (OUTPATIENT)
Dept: RADIOLOGY | Facility: MEDICAL CENTER | Age: 51
DRG: 003 | End: 2021-06-12
Attending: HOSPITALIST
Payer: MEDICARE

## 2021-06-12 PROCEDURE — A9270 NON-COVERED ITEM OR SERVICE: HCPCS | Performed by: HOSPITALIST

## 2021-06-12 PROCEDURE — 770006 HCHG ROOM/CARE - MED/SURG/GYN SEMI*

## 2021-06-12 PROCEDURE — 700111 HCHG RX REV CODE 636 W/ 250 OVERRIDE (IP): Performed by: INTERNAL MEDICINE

## 2021-06-12 PROCEDURE — 700111 HCHG RX REV CODE 636 W/ 250 OVERRIDE (IP): Performed by: STUDENT IN AN ORGANIZED HEALTH CARE EDUCATION/TRAINING PROGRAM

## 2021-06-12 PROCEDURE — 700102 HCHG RX REV CODE 250 W/ 637 OVERRIDE(OP): Performed by: HOSPITALIST

## 2021-06-12 PROCEDURE — 99232 SBSQ HOSP IP/OBS MODERATE 35: CPT | Performed by: STUDENT IN AN ORGANIZED HEALTH CARE EDUCATION/TRAINING PROGRAM

## 2021-06-12 RX ORDER — MORPHINE SULFATE 4 MG/ML
4 INJECTION, SOLUTION INTRAMUSCULAR; INTRAVENOUS
Status: COMPLETED | OUTPATIENT
Start: 2021-06-12 | End: 2021-06-12

## 2021-06-12 RX ADMIN — OXYCODONE 5 MG: 5 TABLET ORAL at 23:18

## 2021-06-12 RX ADMIN — CEFAZOLIN SODIUM 2 G: 2 INJECTION, SOLUTION INTRAVENOUS at 21:59

## 2021-06-12 RX ADMIN — DOCUSATE SODIUM 50 MG AND SENNOSIDES 8.6 MG 2 TABLET: 8.6; 5 TABLET, FILM COATED ORAL at 17:06

## 2021-06-12 RX ADMIN — RISPERIDONE 2 MG: 2 TABLET ORAL at 17:06

## 2021-06-12 RX ADMIN — ACETAMINOPHEN 500 MG: 325 TABLET ORAL at 21:56

## 2021-06-12 RX ADMIN — GABAPENTIN 300 MG: 300 CAPSULE ORAL at 13:48

## 2021-06-12 RX ADMIN — OXYCODONE 5 MG: 5 TABLET ORAL at 17:06

## 2021-06-12 RX ADMIN — DIPHENHYDRAMINE HYDROCHLORIDE 50 MG: 50 INJECTION INTRAMUSCULAR; INTRAVENOUS at 04:39

## 2021-06-12 RX ADMIN — CEFAZOLIN SODIUM 2 G: 2 INJECTION, SOLUTION INTRAVENOUS at 05:10

## 2021-06-12 RX ADMIN — DIPHENHYDRAMINE HYDROCHLORIDE 50 MG: 50 INJECTION INTRAMUSCULAR; INTRAVENOUS at 17:06

## 2021-06-12 RX ADMIN — GABAPENTIN 300 MG: 300 CAPSULE ORAL at 21:56

## 2021-06-12 RX ADMIN — CLOTRIMAZOLE 1 APPLICATOR: 1 CREAM VAGINAL at 21:56

## 2021-06-12 RX ADMIN — DULOXETINE HYDROCHLORIDE 60 MG: 60 CAPSULE, DELAYED RELEASE ORAL at 17:06

## 2021-06-12 RX ADMIN — MORPHINE SULFATE 4 MG: 4 INJECTION INTRAVENOUS at 11:26

## 2021-06-12 RX ADMIN — DIVALPROEX SODIUM 500 MG: 125 CAPSULE ORAL at 21:56

## 2021-06-12 RX ADMIN — CEFAZOLIN SODIUM 2 G: 2 INJECTION, SOLUTION INTRAVENOUS at 13:50

## 2021-06-12 RX ADMIN — MORPHINE SULFATE 4 MG: 4 INJECTION INTRAVENOUS at 04:45

## 2021-06-12 RX ADMIN — DIPHENHYDRAMINE HYDROCHLORIDE 50 MG: 50 INJECTION INTRAMUSCULAR; INTRAVENOUS at 23:18

## 2021-06-12 RX ADMIN — DIVALPROEX SODIUM 500 MG: 125 CAPSULE ORAL at 13:48

## 2021-06-12 RX ADMIN — ACETAMINOPHEN 500 MG: 325 TABLET ORAL at 13:48

## 2021-06-12 ASSESSMENT — ENCOUNTER SYMPTOMS
DIARRHEA: 0
SHORTNESS OF BREATH: 0
LOSS OF CONSCIOUSNESS: 0
DIZZINESS: 0
SORE THROAT: 0
ABDOMINAL PAIN: 0
COUGH: 0
FEVER: 0
PALPITATIONS: 0
HEADACHES: 0
VOMITING: 0
NAUSEA: 0
DOUBLE VISION: 0
CHILLS: 0
BLURRED VISION: 0
BACK PAIN: 0

## 2021-06-12 ASSESSMENT — PAIN DESCRIPTION - PAIN TYPE
TYPE: ACUTE PAIN

## 2021-06-12 NOTE — PROGRESS NOTES
Consult requested by Dr. Chaudhry to evaluate patient for IR guided chest tube placement due to persistent RLL loculated pleural fluid collection concerning for empyema. Patient underwent VATS with decortication back on 5/17/2021. Assessed patient at bedside. She reports no complaints and feels better overall. Family is concern the necessity of repeat chest tube placement with instillation of intrapleural lytic therapy. Recommend reconsulting thoracic surgery and if deemed appropriate then will need CT guided chest tube pigtail catheter placement by IR and pulmonary will be more than happy to instill intrapleural lytic therapy per [facility] protocol.      Claritza Brannon MD   Pulmonary Critical Care   Formerly [facility], 1928–1983

## 2021-06-12 NOTE — PROGRESS NOTES
RN called Echo to follow up about an order placed 6/10/2021. MD ordered an echo that has yet to be done. Echo informed there were multiple echos ordered and moving her up was not a priority unless she were to discharge.    Infectious Disease informed.

## 2021-06-12 NOTE — CARE PLAN
The patient is Stable - Low risk of patient condition declining or worsening    Shift Goals  Clinical Goals: pain management; decreased anxiety  Patient Goals: Pain   Family Goals: NA    Problem: Pain Management  Goal: Pain level will decrease to patient's comfort goal  Outcome: Progressing     Problem: Knowledge Deficit - Standard  Goal: Patient and family/care givers will demonstrate understanding of plan of care, disease process/condition, diagnostic tests and medications  Outcome: Progressing     Problem: Fall Risk  Goal: Patient will remain free from falls  Outcome: Progressing     Progress made toward(s) clinical / shift goals: administered medication per MAR for reported pain. Updated pt on plan of care, no new concerns at this time.

## 2021-06-12 NOTE — CARE PLAN
The patient is Stable - Low risk of patient condition declining or worsening    Shift Goals  Clinical Goals: Pain Management    Progress made toward(s) clinical / shift goals:    Problem: Pain Management  Goal: Pain level will decrease to patient's comfort goal  Outcome: Progressing  Note: PRN medications available as a therapeutic support       Patient is not progressing towards the following goals:

## 2021-06-12 NOTE — PROGRESS NOTES
Ultrasound called RN informing that the radiologist deemed the thoracentesis unreasonable due to the fact of the fluid being potentially an abscess. US wanting RN to cancel the thoracentesis, let MD Kan aware at bedside and said he would contact the doctor for further information and discussion of possible interventions. RN let US not to cancel until doctor contacts.    Contacted MD Joel about situation and US wanting to cancel.

## 2021-06-12 NOTE — PROGRESS NOTES
Hospital Medicine Daily Progress Note    Date of Service  6/12/2021    Chief Complaint  50 y.o. female admitted 4/16/2021 with altered mental status    Hospital Course  This is a 50-year-old female with a past medical history of pseudotumor cerebri s/p  shunt implantation by Dr. 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  [facility] infectious disease is managing antibiotics for the patient.  Dr. Candelaria surgery removed the port on 4/12.  The patient was further identified to have endocarditis of the tricuspid valve, a lumbar puncture performed on 4/14 showed pleocytosis with negative Gram stain.  The patient suffered worsening leukocytosis and was found to have septic emboli per CT.  Initially a small pleural catheter was placed.  Significant loculations were encountered.  MSSA empyema was diagnosed.  Neurosurgery was consulted to comment on possibly removal of a  shunt, this was felt not appropriate at this time.  The patient remained on mechanical ventilation for 15 days, then a perc trach was placed.  The patient was eventually liberated from mechanical ventilation, the patient did have a right thoracoscopy and decortication of the lung performed on 4/28 by Dr. Ganser.  The patient was of identified to have multiple sources for MSSA including mitral valve, tricuspid valve vegetation, port, spinal stimulator,  shunt possibly.  Back to the OR for VAT and decortication on 5/17  Trach de-canulated 6/8       Interval Problem Update  No complaints    ID Consult appreciated, Repeat Chest CT reveals R lung abscess quite large 8x6cm, likely  residual from her previous infection.    IR for thoracentesis,   Pulmonology (Dr Brannon) recommended to have surgical eval before we proceed with chest tube since patient had decorication done in April by Dr Ganser, who is not oncall today, on call surgeon informed. surgeon recommended call [place] surgery team, pending [place] surgery team call back.       On ancef  AFebrile    Consultants/Specialty  ID  Pulmonary  surgery    Code Status  Full Code    Disposition  Abx's stop date 6/14  Plan repeat CT chest prior to completion of Abx's    Review of Systems  Review of Systems   Constitutional: Negative for chills and fever.   HENT: Negative for sore throat.    Eyes: Negative for blurred vision and double vision.   Respiratory: Negative for cough and shortness of breath.    Cardiovascular: Negative for chest pain, palpitations and leg swelling.   Gastrointestinal: Negative for abdominal pain, diarrhea, nausea and vomiting.   Genitourinary: Negative for dysuria and urgency.   Musculoskeletal: Negative for back pain.   Skin: Negative for rash.   Neurological: Negative for dizziness, loss of consciousness and headaches.        Physical Exam  Temp:  [36.3 °C (97.4 °F)-37.1 °C (98.8 °F)] 36.8 °C (98.2 °F)  Pulse:  [80-99] 80  Resp:  [16-20] 17  BP: ()/(56-67) 100/60  SpO2:  [94 %-99 %] 98 %    Physical Exam  Vitals reviewed.   Constitutional:       General: She is not in acute distress.     Appearance: Normal appearance. She is well-developed. She is not diaphoretic.   HENT:      Head: Normocephalic and atraumatic.   Eyes:      General: No scleral icterus.        Right eye: No discharge.         Left eye: No discharge.   Neck:      Vascular: No JVD.   Cardiovascular:      Rate and Rhythm: Normal rate and regular rhythm.      Heart sounds: Murmur heard.     Pulmonary:      Effort: Pulmonary effort is normal. No respiratory distress.      Breath sounds: No stridor. No wheezing or rales.   Abdominal:      Palpations:  Abdomen is soft.   Musculoskeletal:         General: No tenderness.      Right lower leg: No edema.      Left lower leg: No edema.   Skin:     General: Skin is warm and dry.      Findings: No rash.   Neurological:      General: No focal deficit present.      Mental Status: She is alert and oriented to person, place, and time.   Psychiatric:         Mood and Affect: Mood normal.         Thought Content: Thought content normal.         Fluids    Intake/Output Summary (Last 24 hours) at 6/12/2021 0756  Last data filed at 6/11/2021 1338  Gross per 24 hour   Intake 840 ml   Output --   Net 840 ml       Laboratory                        Imaging  CT-CHEST (THORAX) WITH   Final Result      1.  Near complete resolution of multifocal bilateral pulmonary airspace process consistent with pneumonia      2.  Loculated right anterior and anterior/lateral rim-enhancing pleural fluid collection consistent with an empyema. Extent is from just below the lung apex to the diaphragm and the largest component measures 8.0 x 6.0 cm transversely      3.  Ill-defined left upper lobe pulmonary nodule which are most likely postinflammatory      4.  Hepatic steatosis and hepatomegaly                  DX-CHEST-PORTABLE (1 VIEW)   Final Result      1.  Interval removal of the right-sided chest tubes. No pneumothorax is identified.   2.  Airspace opacities in the right are mildly improved and may represent atelectasis/consolidation.   3.  There is likely a small right pleural effusion.   4.  Patchy opacities on the left are mildly improved.   5.  Interval removal of the enteric tube.      DX-CHEST-PORTABLE (1 VIEW)   Final Result         1. Stable lines and tubes.   2. Stable ill-defined bilateral pulmonary opacities, right worse than left. No large pleural effusions.         DX-CHEST-PORTABLE (1 VIEW)   Final Result      1.  Decrease in volume of right pleural fluid collection/empyema with 2 right chest tubes in place. No pneumothorax identified.       2.  No focal consolidation in the left lung.      DX-CHEST-PORTABLE (1 VIEW)   Final Result      No significant interval change.      DX-CHEST-PORTABLE (1 VIEW)   Final Result         1.  Hazy right pulmonary infiltrates and/or effusion, stable compared to prior study.      DX-CHEST-PORTABLE (1 VIEW)   Final Result         1.  Hazy right pulmonary infiltrates and/or effusion, stable compared to prior study.      DX-CHEST-PORTABLE (1 VIEW)   Final Result      1.  All lines and tubes appear appropriately located      2.  Consolidation throughout the right lung consistent with pneumonia      DX-CHEST-PORTABLE (1 VIEW)   Final Result         1.  Hazy right pulmonary infiltrates and/or effusion, increased compared to prior study.      DX-ABDOMEN FOR TUBE PLACEMENT   Final Result      Feeding tube looped in the stomach.      DX-CHEST-PORTABLE (1 VIEW)   Final Result         1. Stable lines and tubes. No pneumothorax detected.   2. Persistent opacities in the right perihilar region and in the right lung periphery, similar to prior study. Left lung is essentially clear.      DX-CHEST-PORTABLE (1 VIEW)   Final Result      Postoperative changes of the right chest with placement of an additional chest tube. There is improved aeration in the right lung. No significant pleural effusion or definite pneumothorax.      Right IJ central venous catheter tip projects over the proximal right atrium.      Hypoinflation with interstitial and hazy pulmonary opacities.      DX-CHEST-PORTABLE (1 VIEW)   Final Result         1.  Hazy right pulmonary infiltrates and/or effusion, similar compared to prior study.      DX-ABDOMEN FOR TUBE PLACEMENT   Final Result      Feeding tube tip projects over the expected location the gastric antrum.      DX-CHEST-PORTABLE (1 VIEW)   Final Result         1.  Hazy right pulmonary infiltrates and/or effusion, similar compared to prior study.      DX-CHEST-LIMITED (1 VIEW)   Final Result      1.  No  pneumothorax. Only one chest tube is now seen in the right lung.   2.  The chest is otherwise stable.      DX-CHEST-PORTABLE (1 VIEW)   Final Result         1.  Hazy right pulmonary infiltrates and/or effusion, similar compared to prior study.      DX-CHEST-PORTABLE (1 VIEW)   Final Result         1.  Hazy right pulmonary infiltrates and/or effusion, similar compared to prior study.      DX-ABDOMEN FOR TUBE PLACEMENT   Final Result         1.  Nonspecific bowel gas pattern.   2.  Dobbhoff tube tip terminates overlying the expected location of the gastric antrum.   3.  Hazy bilateral lung base infiltrates, greater on the right.      DX-CHEST-PORTABLE (1 VIEW)   Final Result         1.  Hazy right pulmonary infiltrates and/or effusion, similar compared to prior study.      US-RUQ   Final Result         1.  Hepatomegaly   2.  Mild intrahepatic and extrahepatic biliary ductal dilatation, commonly associated with postcholecystectomy physiology, consider causes of biliary obstruction with additional workup as clinically appropriate      CT-CHEST,ABDOMEN,PELVIS WITH   Final Result      1.  Large empyema in the RIGHT hemithorax, slightly decreased in size from prior exam with chest tubes present.   2.  Consolidation remains.   3.  Multiple cavitary lesions again seen in the LEFT upper lobe, minimally decreased from prior, concerning for septic emboli.   4.  Supportive tubing is unchanged.   5.  Intrahepatic and extrahepatic biliary dilation, likely postoperative although distal stricture, stone or mass remain possibilities.   6.  Moderate pelvic fluid, nonspecific.   7.  No evidence of bowel obstruction or perforation.      DX-CHEST-PORTABLE (1 VIEW)   Final Result         1.  Hazy right pulmonary infiltrates and/or effusion, similar compared to prior study.      DX-CHEST-PORTABLE (1 VIEW)   Final Result         No significant interval change.            DX-CHEST-PORTABLE (1 VIEW)   Final Result         Interval removal of  left central venous catheter. Interval adjustment of right PICC with tip in the SVC.         DX-ABDOMEN FOR TUBE PLACEMENT   Final Result      Feeding tube tip at the mid to distal stomach.      IR-PICC LINE PLACEMENT W/ GUIDANCE > AGE 5   Final Result                  Ultrasound-guided PICC placement performed by qualified nursing staff as    above.          DX-CHEST-PORTABLE (1 VIEW)   Final Result      1.  Stable cardiopulmonary findings.   2.  See comments above regarding the right-sided PICC position.      DX-CHEST-PORTABLE (1 VIEW)   Final Result      Stable chest findings compared to 5/5.      DX-CHEST-PORTABLE (1 VIEW)   Final Result      Stable chest findings compared with 5/3.      DX-ABDOMEN FOR TUBE PLACEMENT   Final Result         Feeding tube with tip projecting over the expected area of the stomach.      DX-CHEST-PORTABLE (1 VIEW)   Final Result      Stable chest findings compared with prior.      DX-CHEST-PORTABLE (1 VIEW)   Final Result         1. No significant interval change.      US-EXTREMITY ARTERY LOWER UNILAT RIGHT   Final Result      DX-CHEST-PORTABLE (1 VIEW)   Final Result         1. No significant interval change.      IR-PICC LINE PLACEMENT W/ GUIDANCE > AGE 5   Final Result                  Ultrasound-guided PICC placement performed by qualified nursing staff as    above.          DX-CHEST-PORTABLE (1 VIEW)   Final Result         1.  Pulmonary edema and/or infiltrates, similar to prior study.   2.  Stable opacification right lung, likely effusion. Thoracostomy tubes are in place.   3.  Multiple cavitary appearing left pulmonary lesions, see CT performed April 27, 2021 for further characterization      DX-CHEST-PORTABLE (1 VIEW)   Final Result         1.  Pulmonary edema and/or infiltrates, similar to prior study.   2.  Single opacification right lung, likely effusion. Thoracostomy tubes are in place.   3.  Multiple cavitary appearing left pulmonary lesions, see CT performed April 27,  2021 for further characterization   4.  Soft tissue gas in the right chest wall      DX-CHEST-PORTABLE (1 VIEW)   Final Result         1.  Pulmonary edema and/or infiltrates, similar to prior study.   2.  Increased opacification right lung, likely increased effusion. Thoracostomy tubes are in place.   3.  Multiple cavitary appearing left pulmonary lesions, see CT performed April 27, 2021 for further characterization   4.  Soft tissue gas in the right chest wall      DX-CHEST-PORTABLE (1 VIEW)   Final Result         1.  Pulmonary edema and/or infiltrates, similar to prior study.   2.  Right pneumothorax, stable compared to prior study, right thoracostomy tubes remain in place   3.  Multiple cavitary appearing left pulmonary lesions, see CT performed April 27, 2021 for further characterization   4.  Soft tissue gas in the right chest wall      DX-CHEST-PORTABLE (1 VIEW)   Final Result         1.  Pulmonary edema and/or infiltrates, similar to prior study.   2.  Right pneumothorax, interval decreased compared to prior study, interval placement of right thoracostomy tubes is noted.   3.  Multiple cavitary appearing left pulmonary lesions, see CT performed yesterday for further characterization   4.  Soft tissue gas in the right chest wall      DX-CHEST-PORTABLE (1 VIEW)   Final Result         1.  Pulmonary edema and/or infiltrates, similar to prior study.   2.  Right pneumothorax, stable compared to prior study, interval removal of right thoracostomy tube is noted.   3.  Multiple cavitary appearing left pulmonary lesions, see CT performed yesterday for further characterization   4.  Soft tissue gas in the right chest wall      DX-CHEST-LIMITED (1 VIEW)   Final Result         No significant interval change.      CT-CTA CHEST PULMONARY ARTERY W/ RECONS   Final Result         No CT evidence of pulmonary embolus.      Previous right chest tube were removed.      Grossly similar in size of the loculated right  hydropneumothorax but there is increased layering fluid component. Unchanged mild shift of the mediastinal to the left.      Redemonstration of a pigtail catheter in the medial left lung base. Grossly similar multiple cavitary lesions in the left lung.      US-EXTREMITY VENOUS LOWER BILAT   Final Result      POCT BUTTERFLY-DUPLEX SCAN EXTREMITY VEINS LIMITED   Final Result      DX-CHEST-PORTABLE (1 VIEW)   Final Result         1.  Pulmonary edema and/or infiltrates, similar to prior study.   2.  Right pneumothorax, somewhat decreased to prior study, interval removal of right thoracostomy tube is noted.   3.  Multiple cavitary appearing left pulmonary lesions, see CT performed yesterday for further characterization   4.  Soft tissue gas in the right chest wall      DX-CHEST-PORTABLE (1 VIEW)   Final Result         1.  Pulmonary edema and/or infiltrates, similar to prior study.   2.  Right pneumothorax, similar to prior study with thoracostomy tube in place.   3.  Multiple cavitary appearing left pulmonary lesions, see CT performed yesterday for further characterization      CT-CHEST (THORAX) W/O   Final Result      Large loculated right hydropneumothorax with interval increase in size of the pneumothorax and pleural fluid.      Right chest tube is in the posterior right thorax.      There is mediastinal shift to the left compatible with tension.      Small pericardial effusion.      Small loculated left pleural effusion with overlying atelectasis/consolidation.   Pigtail catheter is seen at the medial left lung base. Pleural effusion has decreased in size compared to prior.      There are multiple foci of air extending from the right lung to the pleural space suspicious for a bronchopleural fistula.      Multiple cavitary lesions bilaterally with mild interval decrease of the solid component of the cavitary nodules.      Noncavitary 3.1 cm masslike density is seen at the left lung base.      Findings may be related  to septic emboli, malignancy or multifocal abscesses. Short interval follow-up recommended.      Soft tissue air on the right is again seen.      Splenomegaly      Findings discussed with Leti Sun.      DX-CHEST-PORTABLE (1 VIEW)   Final Result      No significant change from prior exam.      CT-HEAD W/O   Final Result      1.  RIGHT frontal ventriculostomy catheter unchanged in position.   2.  Mild cortical atrophy.   3.  No intracranial hemorrhage.   4.  Apparent dural thickening overlying the clivus.  Consider further evaluation with contrast-enhanced MRI.      DX-CHEST-LIMITED (1 VIEW)   Final Result      Interval left basilar pigtail catheter with diminished atelectasis, pleural fluid      Stable right hydropneumothorax with thoracostomy tube in place, considerable soft tissue gas and partial lung collapse      IR-CHEST TUBE-EMPYEMA LEFT   Final Result      1.  CT GUIDED LEFT  10 [place] PIGTAIL CHEST TUBE PLACEMENT FOR POSSIBLE EMPYEMA YIELDING OLD BLOODY FLUID.      2. A CHEST RADIOGRAPH IS FORTHCOMING.      EC-MICAH W/O CONT   Final Result      US-ABDOMEN LTD (SOFT TISSUE)   Final Result      No fluid seen surrounding the electronic implanted spinal stimulator device.      DX-CHEST-LIMITED (1 VIEW)   Final Result      1.  Large right hydropneumothorax with right-sided chest tube in place, likely stable to slightly decreased from prior study.   2.  Small left pleural effusion. Mild improved aeration in the left lung.   3.  Bilateral pulmonary opacities which could be related to combination of atelectasis, edema and/or infection..      CT-CTA HEAD WITH & W/O-POST PROCESS   Final Result      1.  Patent carotid and vertebral arteries.      2.  Again seen right frontal the jugular peritoneal shunt catheter. There is mild increase in volume of the lateral and third ventricles.      CT-CHEST (THORAX) W/O   Final Result      1.  Interval placement of a right-sided chest tube into a large loculated right-sided  pleural effusion. There is now seen a large right-sided hydropneumothorax in the area that previously seen fluid. The chest tube extends into that hydropneumothorax.    There is some residual pleural fluid seen as well. A hydropneumothorax is somewhat loculated with components most prominently inferiorly and medially.      2.  New loculated left pleural effusion.      3.  Again seen multiple bilateral cavitary pulmonary masses which are more prominent than previously seen with increasing cavitation and measure up to 3 cm size. Differential diagnosis includes septic emboli, multifocal abscesses and neoplasm.      4.  Large amount of subcutaneous emphysema on the right.      5.  Splenomegaly.      6.  Multiple support devices.      Fleischner Society pulmonary nodule recommendations:         DX-CHEST-LIMITED (1 VIEW)   Final Result      1.  Support devices as described above.      2.  Right chest tube present with a again seen right-sided pneumothorax, possibly increased in size and loculated.      3.  Worsening bilateral pulmonary infiltrates.      DX-ABDOMEN FOR TUBE PLACEMENT   Final Result      Cortrak feeding tube tip projects in the region of the duodenal bulb.      DX-CHEST-LIMITED (1 VIEW)   Final Result      Loculated right pneumothorax is decreased in size compared to prior.      Small left pleural effusion.      Small loculated right pleural effusion.      Extensive bilateral airspace opacities are not significantly changed.      Soft tissue air on the right is again noted.      DX-CHEST-LIMITED (1 VIEW)   Final Result      Decreased partially loculated right pleural fluid with increased pleural air. Right chest tube is in place.      Increased hazy opacity in the left lung possibly atelectasis.         US-ABDOMEN LTD (SOFT TISSUE)   Final Result      No drainable fluid collection.      DX-CHEST-PORTABLE (1 VIEW)   Final Result      Decreased partially loculated right pleural fluid with increased pleural  air. Right chest tube is in place.      No other significant change.      DX-CHEST-PORTABLE (1 VIEW)   Final Result         1.  Pulmonary edema and/or infiltrates are identified, which are somewhat decreased since the prior exam.   2.  Moderate loculated appearing right pleural effusion, slightly decreased since prior      DX-ABDOMEN FOR TUBE PLACEMENT   Final Result      Feeding tube tip at the distal stomach.      DX-CHEST-PORTABLE (1 VIEW)   Final Result         1.  New chest tube is noted in the right lower hemithorax.      2.  Right pleural effusion is again identified which appears similar to the prior exam.      3.  No new infiltrates or consolidations are identified.      DX-CHEST-PORTABLE (1 VIEW)   Final Result         1.  Pulmonary edema and/or infiltrates are identified, which are stable since the prior exam.   2.  Moderate loculated appearing right pleural effusion      DX-CHEST-PORTABLE (1 VIEW)   Final Result         No significant interval change.      POCT BUTTERFLY-ECHOCARDIOGRAM LTD W/O CONT    (Results Pending)   EC-ECHOCARDIOGRAM COMPLETE W/ CONT    (Results Pending)   US-THORACENTESIS PUNCTURE RIGHT    (Results Pending)        Assessment/Plan  * Acute bacterial endocarditis- (present on admission)  Assessment & Plan  Mitral valve, tricuspid valve  MSSA  Continue Ancef through 6/14/2021    Vaginal itching  Assessment & Plan  Topical Chlortrimazole    Debility  Assessment & Plan  Acute on chronic  Recovering well: may need rehab vs DC with HH if she cont's to do well    Elevated alkaline phosphatase level- (present on admission)  Assessment & Plan  The patient not able to tolerate a MRCP at this time, ultrasound grossly unremarkable, observe    Agitation requiring sedation protocol- (present on admission)  Assessment & Plan  resolved    Goals of care, counseling/discussion  Assessment & Plan  Patient with overall high risk of morbidity and mortality given her gravely ill state and prior chronic  medical conditions  Palliative care following    Spinal cord stimulator status- (present on admission)  Assessment & Plan  Patient's stimulator is [employer]. It is FDA approved as MRI compatible, but the company has gone out of business, so there is no support team to assist with MRI.  Thus, if MRI were performed, our radiologists would need to protocol it correctly to manage the stimulator. The former rep from [employer] is Andre, [phone].  Information obtained from Dr. Mahoney's office, [phone].     Per  (5/4/2021), it is not MRI compatible unfortunately.     Thrombocytosis (HCC)  Assessment & Plan  Acute reaction since resolved    Anasarca- (present on admission)  Assessment & Plan  Improved    Pleural effusion, left  Assessment & Plan  resolved    Trapped lung  Assessment & Plan  Now s/p decortication   Chest tubes removed on 5/26, observe    Pulmonary abscess (HCC)  Assessment & Plan  From septic emboli  Now s/p decortication    Fever  Assessment & Plan  Resolved, monitor, antibiotic therapy    Pneumonia  Assessment & Plan  MSSA  Ancef per ID through 6/14  Repeat CT Chest showing some residual empyema: have DW ID and will plan to tap.  Prefer not to go back to the OR given the pt's prolonged and complex hospital course    History of vasculitis- (present on admission)  Assessment & Plan  Does not appear to be a current issue    Bacteremia due to methicillin susceptible Staphylococcus aureus (MSSA)- (present on admission)  Assessment & Plan  Ancef through 6/14    CSF pleocytosis- (present on admission)  Assessment & Plan  Infectious disease managing    Empyema of right pleural space (HCC)- (present on admission)  Assessment & Plan  Patient s/p multiple chest tubes, thoracotomy and decortication  Now s/p second decortication 5/17  Per thoracic surgery note, sutures to be removed on 6/9  Plan Abx's through 6/14      Repeat CT still shows empyema, ID requested US thoracocentesis, which less likely to be  drained out due to empyema nature,  Pulmonology consult requested for necessity of chest tube placement    Acute blood loss anemia  Assessment & Plan  Likely secondary to hemothorax  300 cc of blood removed after chest tube placed at [facility]  Hb has been stable around 9-10    Prolonged Q-T interval on ECG- (present on admission)  Assessment & Plan  Resolved after correcting metabolic issues  Cont to follow   Monitor electrolytes, acidosis etc    Acute respiratory failure with hypoxia (HCC)- (present on admission)  Assessment & Plan  Secondary to MSSA bacteremia, empyema, pulmonary septic emboli  S/p tracheostomy, trach is been capped for more than 24 hours, I discussed with respiratory therapy and evaluated the patient, we agreed decannulation is appropriate  Postacute planning    History of complicated migraines- (present on admission)  Assessment & Plan  Monitor    GERD (gastroesophageal reflux disease)- (present on admission)  Assessment & Plan  History of, as needed treatment    Hyponatremia  Assessment & Plan  Mild - asymptomatic  Monitor    Tachycardia  Assessment & Plan  Persistent, likely secondary with multiple underlying ongoing conditions  Monitor  Slowly trending down; now 80-90s    Pseudotumor cerebri  Assessment & Plan  History of  shunt, neurosurgery opted against removal    H/O: CVA (cerebrovascular accident)- (present on admission)  Assessment & Plan  Monitor    Chronic pain syndrome- (present on admission)  Assessment & Plan  Pre-existing, pain management    Port or reservoir infection  Assessment & Plan  S/p removal    Thrombocytopenia (HCC)- (present on admission)  Assessment & Plan  Transient, resolved    Anemia- (present on admission)  Assessment & Plan  Monitor, transfuse as indicated    Septic shock (HCC)- (present on admission)  Assessment & Plan  Sepsis is resolved    Sepsis (HCC)  Assessment & Plan  Sepsis has resolved    Hypomagnesemia  Assessment & Plan  Repeat lab  tomorrow    Hypokalemia  Assessment & Plan  Monitor, replace and recheck    Anxiety- (present on admission)  Assessment & Plan  Continue Cymbalta and Paxil  As needed Valium    S/P  shunt- (present on admission)  Assessment & Plan  History of, Dr. Oh felt there is currently no need to remove    Folliculitis  Assessment & Plan  Improved    Acute encephalopathy- (present on admission)  Assessment & Plan  Per neurosurgery no need to remove  shunt  Improved         VTE prophylaxis: enoxaparin

## 2021-06-12 NOTE — PROGRESS NOTES
Received bedside report and accepted care of patient.    Pt is currently A/ox4 1L via NC, with no visible or stated sign of distress, discomfort, or SOB. Pt is currently NPO due to a potential thoracentesis, updated infectious disease about conversation with radiology yesterday. Pt is continent of both. Pt has a PICC in place for long term ABX.    Patient currently resting in bed in no visible or stated signs of distress. Bed in locked and lowest position. Call light and personal possessions within reach. Patient educated about use of call light and verbalized understanding.

## 2021-06-12 NOTE — PROGRESS NOTES
Assessment/description of ears? Intact, pink, blanching   Which preventative measures are in place for the ears?  -grey foam to oxygen tubing, pt removes glasses while sleeping    Assessment/description of elbows? Intact, pink, blanching   Which preventative measures are in place for the elbows?  -pillows in use for support    Assessment/description of sacrum? Peeling, patchy, rashy, red, excoriated   Which preventative measures are in place for the sacrum?  -barrier paste, pt repositions self     Assessment/description of heels? Slightly boggy, pink, blanching   Which preventative measures are in place for the heels?  -heels floated on pillows    Which devices are in place? PICC, NC, glasses  Description of skin under devices: clean/dry/intact  Which preventative measures are in place under devices?    Other:  Pressure injury to right great toe, injury to 3rd toe on left foot; rib cage has healing wound

## 2021-06-12 NOTE — PROGRESS NOTES
Infectious Disease Daily Progress Note    Date of Service  6/12/2021    Chief Complaint  Cefazolin 5/18-19, 5/26-present Target 6/14/21  Total abx therapy D#60     Thoracoscopy & Decortication - 4/28  Decortication 5/17     PRIOR Rx:   Zosyn 4/22-4/23  Previous: Unasyn, Zosyn, Vanco, Daptomycin  Ceftaroline: start 4/13-4/15  Nafcillin 2g Q4 hrs 4/15-4/23      Meropenem 5/19-5/26   Cefepime 4/23 5/18 4/14: Unable to give name of previous NSG or neurologist. Seems confused, but able to say some sentences fluently.   4/15: Line cultures now growing MSSA. As well as from the blood. Repeat blood culture 4/13+ in both sets. Patient has worsening confusion. CSF fluid analyses suggest pleocytosis. Cultures are no growth from the CSF. Chest CT was ordered this morning indicating a loculated right pleural effusion as well as bilateral septic emboli. Dr. Mack spoke with Dr. Oh yesterday and no surgical intervention unless clinical deterioration.  4/16: Increased respiratory distress.  Tachypneic.  CT with loculated collection.  Dr Resendiz not available.  No thoracic surgeon available.  Plans to have pulmonary place CT.  Transferring to ICU.  4/17: Transferred to [facility] last night. Hgb dropped to 6.4 requiring PRBC transfusion. Requiring 2 pressors. Fio2 50%. Febrile 38.8. Pending OR decortication of empyema and hemothorax. Chest tube placed at [facility] shows 8,371 WBC, ,000, 74% N  4/18: Chest tube was upsized by surgery yesterday, no decortication. WBC 22k. Fio2 40%. Vasopressin. Levophed down to 2mcg. Afebrile 24 hrs. Last fever 38.6 on 4/16.   4/19: Still on vent. Levo 3 mcg, vasopressin. Afebrile 72 hours. WBC 21k. BC 4/17 NGTD x 48 hours. Unable to do MRI brain given neurostimulator per RN.   4/20: Remaining afebrile. Hb 6.2 and undergoing transfusion today.WBC 24,100, 5% bands.1700 ml CT output. Copious bloody ET secretions. No pressors. Receiving dornase and TPA per CT. Right pleural effusion not large per  CXR today.   4/21: WBC 31k. Bronchoscopy yesterday showing blood. Cultures sent. TPA on hold per RN due to arvind blood from chest tube requiring PRBC transfusion for hgb 6. Pending chest CT today. Per , spinal stimulator is non-MRI compatible. Levophed 10mcg. 30% Fio2. Afebrile.   4/22: Fever 101.3 this am. WBC 20,300 down from 32,200 yesterday. BAL growing Klebsiella pneumoniae but susceptibility panel not set up until today. CTA of brain shows no lesion. CT of chest shows enlarging cavitary lung lesions bilat and large loculated new left pleural effusion and large hydropneumothorax on right. TPA & dornase infusions of right chest ended 4/20 after considerable bleeding requiring transfusion. Hb now down again to 6.8.  4/23: WBC 23k. Fio2 40%. Tmax 38.1. US of stiumlator shows small fluid collection but no drainable abscess. Pending MICAH today. Pending L chest tube placement  4/24: Continue fever 100.8-101.8. WBC 16,600. MICAH shows large vegetations on both tricuspid valve and mitral valve with mild TR & MR.  No aortic root abscess. Left chest tube placed yesterday yielding 8 ml of old bloody fluid. Bronch today revealed copious thick maroon secretions in distal trachea and both mainstem bronchi. On the right these were obstructive. Remaining off pressors. Last positive blood cultures (MSSA) were 4/16, negative 4/17.  4/25: Fever 100.6 this AM. wBC 13,300. Hb 6.6. CT: right hydropneumothorax increasing, right bronchopleural fistula, mediastinal shift ot the left , multiple cavitary pulmonary nodules, 3.1 cm left basilar mass, splenomegaly. CT of head shows dural thickening over the clivus. Lac+ GNR >100,000 col/ml growing from BAL of 4/24, presumed Klebsiella. Left peural fluid culture negative from 4/23.  4/26: Fever 100.4 last night. WBC 13,500. Hb 7.4. Plts 502,000. ESR >120. UA fairly clear except 30 mg% protein, 2-5 wbc and rare rbc. CXR unchanged except more prominent pulmonary edema. GNR in BAL may be a  different species of Klebsiella, and resistant to ampicillin & tmp-sulfa; confirmation pending.  4/28: Fever 100.8 last night. WBC 27,700. Hb 6.6. Plts 482,000. Creat 0.19. Kleb pneum in BAL on 4/24 is resistant to ampicillin & tmp-sulfa but otherwise sensitive. O2 sat 96% on FIO2 30% now, PEEP 8. Has been re-evaluated by thoracic surgeon, Dr. Ganser, who believes she will not wean without decortication on the right. Surgery anticipated today.  4/29: S/P right thoracoscopy with decortication with cultures NG at 24 hrs.  4/30: Had a bronch due to hypoxia and hypotension. CXR with worsening right hemithorax pulmonary opacities. Bloody mucous plugs removed. Pressors started. Febrile this am. Tachy in 130s. Pressors just removed. Tolerating vent, but not a SBT candidate at the moment.  5/1: Small air leak CT-2 every ~ 2/3 breathes. The K. pneumoniae from her thoracoscopy is sensitive to her cefepime so no antibiotic change needed.   5/2: No air leak today she tolerated 2  hrs of spontaneous breathing with support today.      5/3: Second day with SBT and doing well now for 2 hrs. Slight bump in temperature and      WBC's but no obvious clinical infectious changed so watch closely.      5/4: Fever still from time to time. WBCs trending up. Chest tubes in place. Trach.       5/5: She clearly is better today she was sitting up in bed with open eyes and follows commands. She still has low grade fever but her WBC's are dropping. Cefepime dose increased yesterday for increased CNS coverage if necessary. She will probably need further thoracic surgery as mentioned by Dr. Ganser. The issue of what to do with her stimulator is still up in the air for now as it probably will need to be removed but she is nort a candidate for more major surgery at this time.       5/7: She continues to improve and under go longer SBT trials. She just had a new PICC placed in her right arm and plan is to D/C central line.        5/8: PICC placed. No  fevers. Comfortable. Cortrak placed. Failing SBTs.  5/9: No acute issues. No fevers. Comfortable. Family at bedside.   5/10: No acute issues, fevers or WBC bumps. No changes in condition. She is to get her CT scan today and be reviewed by surgery  5/11:  at bedside.  Has been referred to LANE.  Repeat CT scan completed.  Results noted.  ? If Dr Ganser has seen.  Still with an empyema:  ? If candidate for further surgery.  5/12: Pending next thoracic surgery. No fever. Anxious.  5/13: ? Surgery date.  No one seems to know.  Had speaking valve in and having swallow eval with speech therapy  5/14: No acute issues. No fevers. Surgery Monday.  5/15: One CT accidentally pulled out yesterday.  Has been on T piece and tolerating. Plans for surgery Monday afternoon.  5/16: On schedule for surgery 5/17: 4:30 pm.  Moved to room T614.  No new issues.  5/17: Was sitting up in the bedside chair since change of shift.  Now walking in hallways with RN and CNA.  5/18: Decortication yesterday with 2 chest tube placement. WBC improving 21->14k  5/19: Hypotension and tachycardia.  Decreased H/H.  Placed back on vent to rest.  Dr Lozano in process of placing new line.  Antibiotics were deesculated yesterday to Cefazolin.  No cx were taken at surgery.  5/20: Pt was febrile yest afternoon 38.2, but now afebrile overnight after meropenem. WBC improved 11k->8k. Fio2 30%. Phenylephrine now off this morning. CXR shows new R consolidations.   5/21: Off pressors.  On vent: FiO2 30%, PEEP 8, PS 5 Received pRBCs yesterday.  5/22: Remains afebrile, off pressors. FiO2 30%.   5/23: Afebrile. FiO2 30%. Bronchoscopy yesterday normal.   5/24: No new cultures obtained at last bronchoscopy.  On  T piece this am: FiO2 30%.  5/25: Remaining afebrile. WBC 8600.   5/26: Chest tube removed today. Possible transfer to [facility].  5/27: O2 sat usuallyl 95% but intermittent desat to 87-89%.  Remaining afebrile.WBC 6100. Alk phos 641 but normal transaminases.    5/28: Pending [facility] transfer. NO new issues overnight  5/29: Awaiting bed at [facility]. Remaining afebrile. WBC 5800, creat 0.19.  5/31. Remaining afebrile. WBC 6000. . Remains weak and frail. Alk Phos 694 with normal transaminases. Slightly improved bilateral patchy infiltrates.   6/1: She is anxious to move on but still waiting for insurance approval to [facility].  6/2: Still awaiting bed at [facility]. Remaining afebrile. WBC 8300. Alk Phos 705 but transaminases normal and bilirubine 0.4.  6/4: Awaiting insurance auth before bed can be assigned at [facility]. Remaining afebrile. Extensive folliculitis over back and buttocks.  WBC 8300 on 6/2. Last  on 5/31.  6/5: Her ESR is down to 53 from 102 a good sign hopefully. She continues to work on her strength by walking.  6/6: Speech evaluation to check her swallow as she is stronger and can't stand pureed.  6/7: [facility] was denied by insurance. Could she go to full rehab now? Swallow eval pending.clotrimazole for vaginal drainage and itch.  6/8: Continued to be declined by insurances. No issues clinically. Physical Med declined for rehab. No fevers.  6/9: OT will recommend home PT/OT for her once he finishes her antibiotics.  6/10: Per pt and CM and OT, patient does not need SNF anymore may go straight home.   6/11: He repeat CXR shows an 8X6 fluid collection we will see if IR can drain the fluid before she is D/C'd.      Review of Systems  Review of Systems   All other systems reviewed and are negative.       Physical Exam  Temp:  [36.3 °C (97.4 °F)-37.1 °C (98.8 °F)] 36.3 °C (97.4 °F)  Pulse:  [81-99] 85  Resp:  [17-20] 18  BP: ()/(56-67) 103/67  SpO2:  [94 %-99 %] 97 %  Constitutional:       Appearance: Normal appearance.   HENT:      Head: Normocephalic.   Cardiovascular:      Rate and Rhythm: Normal rate and regular rhythm.      Heart sounds: No murmur heard.     Pulmonary:      Effort: Pulmonary effort is normal.      Comments: Diminished R base  Abdominal:      General:  Abdomen is flat. Bowel sounds are normal. There is no distension.      Tenderness: There is no abdominal tenderness.   Skin:     General: Skin is warm.      Coloration: Skin is not jaundiced.   Neurological:      Mental Status: She is alert.     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       - Acute left empyema s/p chest tube placement 4/23, decortication 5/17, chest tube removal 5/26 - Klebsiella  -Multiple acute septic pulmonary emboli bilaterally  -Acute bilateral pneumonia due to above     --Pulmonary edema  -Pseudotumor cerebri with underlying  shunt: possible arachnoiditis  -Chronic migraine headaches  -History of autoimmune vasculitis  -Elevated alkaline phosphatase & bilirubin  -Acute encephalopathy due to sepsis      - Anemia due to above      - Acute fever on 5/19 likely from secondary VAP      - Secondary VAP R sided pneumonia      - decubitus ulcer sacral.    - R lung abscess 8x6 cm     Plan   Repeat Chest CT reveals R lung abscess quite large 8x6cm, likely residual from her previous infection. Will need thoracentesis, but more importantly pulmonology needs to evaluate, to see if chest tube is appropriate. Will require another long course of antibiotics. Will need to set up for ambulatory infusion    - Pending thoracentesis  - please send for cultures, gram stain, cell count, LDH, protein  - pulmonology re-evaluation of lung abscess to see if chest tube or decortication again would be beneficial  - will need to set up home infusion on  Monday.   - Continue cefazolin. Will need another 4 wk course from thora/chest tube possibly decortication date.   Will need suppression after therapy for her HW to include spinal stimulator,  shunt          - Given MSSA - Keflex appropriate    Dispo: go home. Given lung abscess, will need another 4 week course. Will need to arrange for home infusion. Pt has [employer] without secondary. Also depends if surgical intervention needed. If so will then require LTAC. Dispo To be determined.      Case discussed with patient, RN. Pending discussion with Dr Chaudhry    45 min spent  Thank you for this consult.

## 2021-06-12 NOTE — PROGRESS NOTES
Pt is A&Ox4. Pt is resting in bed, no signs of labored breathing on 1 L NC. Pt will be NPO @ 0000.  Call light & personal belongings within reach, bed in lowest position & locked. Pt refuses bed alarm. Hourly rounding in place. Fall precautions in place and education provided on how to use call light. Pt updated on plan of care for the shift. Pt declines any additional needs at this time.

## 2021-06-13 ENCOUNTER — APPOINTMENT (OUTPATIENT)
Dept: CARDIOLOGY | Facility: MEDICAL CENTER | Age: 51
DRG: 003 | End: 2021-06-13
Attending: INTERNAL MEDICINE
Payer: MEDICARE

## 2021-06-13 LAB
LV EJECT FRACT  99904: 65
LV EJECT FRACT MOD 2C 99903: 59.8
LV EJECT FRACT MOD 4C 99902: 64.41
LV EJECT FRACT MOD BP 99901: 62.33

## 2021-06-13 PROCEDURE — 770006 HCHG ROOM/CARE - MED/SURG/GYN SEMI*

## 2021-06-13 PROCEDURE — 700111 HCHG RX REV CODE 636 W/ 250 OVERRIDE (IP): Performed by: INTERNAL MEDICINE

## 2021-06-13 PROCEDURE — 700117 HCHG RX CONTRAST REV CODE 255: Performed by: INTERNAL MEDICINE

## 2021-06-13 PROCEDURE — 700111 HCHG RX REV CODE 636 W/ 250 OVERRIDE (IP): Performed by: STUDENT IN AN ORGANIZED HEALTH CARE EDUCATION/TRAINING PROGRAM

## 2021-06-13 PROCEDURE — A9270 NON-COVERED ITEM OR SERVICE: HCPCS | Performed by: HOSPITALIST

## 2021-06-13 PROCEDURE — 93306 TTE W/DOPPLER COMPLETE: CPT

## 2021-06-13 PROCEDURE — 93306 TTE W/DOPPLER COMPLETE: CPT | Mod: 26 | Performed by: INTERNAL MEDICINE

## 2021-06-13 PROCEDURE — 99232 SBSQ HOSP IP/OBS MODERATE 35: CPT | Performed by: STUDENT IN AN ORGANIZED HEALTH CARE EDUCATION/TRAINING PROGRAM

## 2021-06-13 PROCEDURE — 700102 HCHG RX REV CODE 250 W/ 637 OVERRIDE(OP): Performed by: HOSPITALIST

## 2021-06-13 RX ADMIN — GABAPENTIN 300 MG: 300 CAPSULE ORAL at 13:48

## 2021-06-13 RX ADMIN — OXYCODONE 5 MG: 5 TABLET ORAL at 05:50

## 2021-06-13 RX ADMIN — GABAPENTIN 300 MG: 300 CAPSULE ORAL at 05:50

## 2021-06-13 RX ADMIN — METHADONE HYDROCHLORIDE 30 MG: 10 TABLET ORAL at 05:50

## 2021-06-13 RX ADMIN — GABAPENTIN 300 MG: 300 CAPSULE ORAL at 21:40

## 2021-06-13 RX ADMIN — DIPHENHYDRAMINE HYDROCHLORIDE 50 MG: 50 INJECTION INTRAMUSCULAR; INTRAVENOUS at 18:15

## 2021-06-13 RX ADMIN — RISPERIDONE 2 MG: 2 TABLET ORAL at 05:50

## 2021-06-13 RX ADMIN — DOCUSATE SODIUM 50 MG AND SENNOSIDES 8.6 MG 2 TABLET: 8.6; 5 TABLET, FILM COATED ORAL at 18:15

## 2021-06-13 RX ADMIN — HUMAN ALBUMIN MICROSPHERES AND PERFLUTREN 3 ML: 10; .22 INJECTION, SOLUTION INTRAVENOUS at 11:08

## 2021-06-13 RX ADMIN — OXYCODONE 5 MG: 5 TABLET ORAL at 11:52

## 2021-06-13 RX ADMIN — DIPHENHYDRAMINE HYDROCHLORIDE 50 MG: 50 INJECTION INTRAMUSCULAR; INTRAVENOUS at 11:52

## 2021-06-13 RX ADMIN — OXYCODONE 5 MG: 5 TABLET ORAL at 18:15

## 2021-06-13 RX ADMIN — ACETAMINOPHEN 500 MG: 325 TABLET ORAL at 14:53

## 2021-06-13 RX ADMIN — ACETAMINOPHEN 500 MG: 325 TABLET ORAL at 09:55

## 2021-06-13 RX ADMIN — DIVALPROEX SODIUM 500 MG: 125 CAPSULE ORAL at 21:39

## 2021-06-13 RX ADMIN — DULOXETINE HYDROCHLORIDE 60 MG: 60 CAPSULE, DELAYED RELEASE ORAL at 18:15

## 2021-06-13 RX ADMIN — DIPHENHYDRAMINE HYDROCHLORIDE 50 MG: 50 INJECTION INTRAMUSCULAR; INTRAVENOUS at 05:43

## 2021-06-13 RX ADMIN — DIVALPROEX SODIUM 500 MG: 125 CAPSULE ORAL at 13:49

## 2021-06-13 RX ADMIN — ENOXAPARIN SODIUM 40 MG: 40 INJECTION SUBCUTANEOUS at 05:50

## 2021-06-13 RX ADMIN — PAROXETINE HYDROCHLORIDE 10 MG: 10 TABLET, FILM COATED ORAL at 05:50

## 2021-06-13 RX ADMIN — CLOTRIMAZOLE 1 APPLICATOR: 1 CREAM VAGINAL at 21:46

## 2021-06-13 RX ADMIN — DULOXETINE HYDROCHLORIDE 60 MG: 60 CAPSULE, DELAYED RELEASE ORAL at 05:50

## 2021-06-13 RX ADMIN — CEFAZOLIN SODIUM 2 G: 2 INJECTION, SOLUTION INTRAVENOUS at 05:46

## 2021-06-13 RX ADMIN — ONDANSETRON 4 MG: 2 INJECTION INTRAMUSCULAR; INTRAVENOUS at 13:49

## 2021-06-13 RX ADMIN — CEFAZOLIN SODIUM 2 G: 2 INJECTION, SOLUTION INTRAVENOUS at 21:42

## 2021-06-13 RX ADMIN — ACETAMINOPHEN 500 MG: 325 TABLET ORAL at 21:39

## 2021-06-13 RX ADMIN — DIVALPROEX SODIUM 500 MG: 125 CAPSULE ORAL at 05:50

## 2021-06-13 RX ADMIN — RISPERIDONE 2 MG: 2 TABLET ORAL at 18:15

## 2021-06-13 RX ADMIN — CEFAZOLIN SODIUM 2 G: 2 INJECTION, SOLUTION INTRAVENOUS at 13:48

## 2021-06-13 ASSESSMENT — PAIN DESCRIPTION - PAIN TYPE
TYPE: ACUTE PAIN

## 2021-06-13 ASSESSMENT — ENCOUNTER SYMPTOMS
CHILLS: 0
NAUSEA: 0
VOMITING: 0
BACK PAIN: 0
LOSS OF CONSCIOUSNESS: 0
FEVER: 0
BLURRED VISION: 0
SORE THROAT: 0
PALPITATIONS: 0
ABDOMINAL PAIN: 0
DOUBLE VISION: 0
HEADACHES: 0
DIARRHEA: 0
COUGH: 0
DIZZINESS: 0
SHORTNESS OF BREATH: 0

## 2021-06-13 NOTE — CARE PLAN
The patient is Stable - Low risk of patient condition declining or worsening    Shift Goals  Clinical Goals: patient will have adequate pain management   Patient Goals: Pain   Family Goals: NA    Progress made toward(s) clinical / shift goals:  patient with pain well controlled on prescribed regimen    Patient is not progressing towards the following goals:

## 2021-06-13 NOTE — PROGRESS NOTES
Pt is A&Ox4. Pt is resting in bed, no signs of labored breathing on 1 L NC. Pt reports pain managed at this time. Call light & personal belongings within reach, bed in lowest position. Pt refuses bed alarm. Fall precautions in place and education provided on how to use call light. Pt updated on plan of care for the shift. Pt declines any additional needs at this time.

## 2021-06-13 NOTE — CARE PLAN
The patient is Stable - Low risk of patient condition declining or worsening    Shift Goals  Clinical Goals: Pain management  Patient Goals: Pain   Family Goals: NA    Problem: Respiratory  Goal: Patient will achieve/maintain optimum respiratory ventilation and gas exchange  Outcome: Progressing     Problem: Pain Management  Goal: Pain level will decrease to patient's comfort goal  Outcome: Progressing       Progress made toward(s) clinical / shift goals: pt reports 8/10 pain in back, administered medication per MAR. Pt on 1 L NC, oxygen saturation remaining above 95%

## 2021-06-13 NOTE — PROGRESS NOTES
Assessment/description of ears? Intact, pink, blanching   Which preventative measures are in place for the ears?  -Grey foam to oxygen tubing; pt removes glasses when sleeping    Assessment/description of elbows? Intact, pink, blanching   Which preventative measures are in place for the elbows?  -Elbows supported with pillows    Assessment/description of sacrum? Patchy/rashy redness, excoriation, blanching   Which preventative measures are in place for the sacrum?  -pt up self; refuses barrier cream     Assessment/description of heels? Intact, slightly boggy, blanching   Which preventative measures are in place for the heels?  -heels floated on pillows    Which devices are in place? PICC, NC, glasses  Description of skin under devices: clean/dry/intact  Which preventative measures are in place under devices?  -PICC dressing changed on day shift    Other:  Pressure injury to right great toe and 3rd toe on left foot.   [place] area is red and excoriated, refused barrier cream   Healing incision to right flank

## 2021-06-13 NOTE — PROGRESS NOTES
patient received at start of shift. Patient AxOx4, vss. Patient with complaints of 7/10 pain, well managed on prescribed regimen. Fall and safety precautions in place, patient out of bed independently with steady gait. Frequent rounding in place, patient with all needs met at this time, sleeping in bed comfortably. Patient compliant withy call light use. will continue to monitor

## 2021-06-13 NOTE — PROGRESS NOTES
Infectious Disease Daily Progress Note    Date of Service  6/13/2021    Chief Complaint  Cefazolin 5/18-19, 5/26-present Target 6/14/21  Total abx therapy D#61     Thoracoscopy & Decortication - 4/28  Decortication 5/17     PRIOR Rx:   Zosyn 4/22-4/23  Previous: Unasyn, Zosyn, Vanco, Daptomycin  Ceftaroline: start 4/13-4/15  Nafcillin 2g Q4 hrs 4/15-4/23      Meropenem 5/19-5/26   Cefepime 4/23 5/18 4/14: Unable to give name of previous NSG or neurologist. Seems confused, but able to say some sentences fluently.   4/15: Line cultures now growing MSSA. As well as from the blood. Repeat blood culture 4/13+ in both sets. Patient has worsening confusion. CSF fluid analyses suggest pleocytosis. Cultures are no growth from the CSF. Chest CT was ordered this morning indicating a loculated right pleural effusion as well as bilateral septic emboli. Dr. Mack spoke with Dr. Oh yesterday and no surgical intervention unless clinical deterioration.  4/16: Increased respiratory distress.  Tachypneic.  CT with loculated collection.  Dr Resendiz not available.  No thoracic surgeon available.  Plans to have pulmonary place CT.  Transferring to ICU.  4/17: Transferred to [facility] last night. Hgb dropped to 6.4 requiring PRBC transfusion. Requiring 2 pressors. Fio2 50%. Febrile 38.8. Pending OR decortication of empyema and hemothorax. [facility] at [facility] shows 8,371 WBC, ,000, 74% N  4/18: Chest tube was upsized by surgery yesterday, no decortication. WBC 22k. Fio2 40%. Vasopressin. Levophed down to 2mcg. Afebrile 24 hrs. Last fever 38.6 on 4/16.   4/19: Still on vent. Levo 3 mcg, vasopressin. Afebrile 72 hours. WBC 21k. BC 4/17 NGTD x 48 hours. Unable to do MRI brain given neurostimulator per RN.   4/20: Remaining afebrile. Hb 6.2 and undergoing transfusion today.WBC 24,100, 5% bands.1700 ml CT output. Copious bloody ET secretions. No pressors. Receiving dornase and TPA per CT. Right pleural effusion not large per  CXR today.   4/21: WBC 31k. Bronchoscopy yesterday showing blood. Cultures sent. TPA on hold per RN due to arvind blood from chest tube requiring PRBC transfusion for hgb 6. Pending chest CT today. Per , spinal stimulator is non-MRI compatible. Levophed 10mcg. 30% Fio2. Afebrile.   4/22: Fever 101.3 this am. WBC 20,300 down from 32,200 yesterday. BAL growing Klebsiella pneumoniae but susceptibility panel not set up until today. CTA of brain shows no lesion. CT of chest shows enlarging cavitary lung lesions bilat and large loculated new left pleural effusion and large hydropneumothorax on right. TPA & dornase infusions of right chest ended 4/20 after considerable bleeding requiring transfusion. Hb now down again to 6.8.  4/23: WBC 23k. Fio2 40%. Tmax 38.1. US of stiumlator shows small fluid collection but no drainable abscess. Pending MICAH today. Pending L chest tube placement  4/24: Continue fever 100.8-101.8. WBC 16,600. MICAH shows large vegetations on both tricuspid valve and mitral valve with mild TR & MR.  No aortic root abscess. Left chest tube placed yesterday yielding 8 ml of old bloody fluid. Bronch today revealed copious thick maroon secretions in distal trachea and both mainstem bronchi. On the right these were obstructive. Remaining off pressors. Last positive blood cultures (MSSA) were 4/16, negative 4/17.  4/25: Fever 100.6 this AM. wBC 13,300. Hb 6.6. CT: right hydropneumothorax increasing, right bronchopleural fistula, mediastinal shift ot the left , multiple cavitary pulmonary nodules, 3.1 cm left basilar mass, splenomegaly. CT of head shows dural thickening over the clivus. Lac+ GNR >100,000 col/ml growing from BAL of 4/24, presumed Klebsiella. Left peural fluid culture negative from 4/23.  4/26: Fever 100.4 last night. WBC 13,500. Hb 7.4. Plts 502,000. ESR >120. UA fairly clear except 30 mg% protein, 2-5 wbc and rare rbc. CXR unchanged except more prominent pulmonary edema. GNR in BAL may be a  different species of Klebsiella, and resistant to ampicillin & tmp-sulfa; confirmation pending.  4/28: Fever 100.8 last night. WBC 27,700. Hb 6.6. Plts 482,000. Creat 0.19. Kleb pneum in BAL on 4/24 is resistant to ampicillin & tmp-sulfa but otherwise sensitive. O2 sat 96% on FIO2 30% now, PEEP 8. Has been re-evaluated by thoracic surgeon, Dr. Ganser, who believes she will not wean without decortication on the right. Surgery anticipated today.  4/29: S/P right thoracoscopy with decortication with cultures NG at 24 hrs.  4/30: Had a bronch due to hypoxia and hypotension. CXR with worsening right hemithorax pulmonary opacities. Bloody mucous plugs removed. Pressors started. Febrile this am. Tachy in 130s. Pressors just removed. Tolerating vent, but not a SBT candidate at the moment.  5/1: Small air leak CT-2 every ~ 2/3 breathes. The K. pneumoniae from her thoracoscopy is sensitive to her cefepime so no antibiotic change needed.   5/2: No air leak today she tolerated 2  hrs of spontaneous breathing with support today.      5/3: Second day with SBT and doing well now for 2 hrs. Slight bump in temperature and      WBC's but no obvious clinical infectious changed so watch closely.      5/4: Fever still from time to time. WBCs trending up. Chest tubes in place. Trach.       5/5: She clearly is better today she was sitting up in bed with open eyes and follows commands. She still has low grade fever but her WBC's are dropping. Cefepime dose increased yesterday for increased CNS coverage if necessary. She will probably need further thoracic surgery as mentioned by Dr. Ganser. The issue of what to do with her stimulator is still up in the air for now as it probably will need to be removed but she is nort a candidate for more major surgery at this time.       5/7: She continues to improve and under go longer SBT trials. She just had a new PICC placed in her right arm and plan is to D/C central line.        5/8: PICC placed. No  fevers. Comfortable. Cortrak placed. Failing SBTs.  5/9: No acute issues. No fevers. Comfortable. Family at bedside.   5/10: No acute issues, fevers or WBC bumps. No changes in condition. She is to get her CT scan today and be reviewed by surgery  5/11:  at bedside.  Has been referred to LANE.  Repeat CT scan completed.  Results noted.  ? If Dr Ganser has seen.  Still with an empyema:  ? If candidate for further surgery.  5/12: Pending next thoracic surgery. No fever. Anxious.  5/13: ? Surgery date.  No one seems to know.  Had speaking valve in and having swallow eval with speech therapy  5/14: No acute issues. No fevers. Surgery Monday.  5/15: One CT accidentally pulled out yesterday.  Has been on T piece and tolerating. Plans for surgery Monday afternoon.  5/16: On schedule for surgery 5/17: 4:30 pm.  Moved to room T614.  No new issues.  5/17: Was sitting up in the bedside chair since change of shift.  Now walking in hallways with RN and CNA.  5/18: Decortication yesterday with 2 chest tube placement. WBC improving 21->14k  5/19: Hypotension and tachycardia.  Decreased H/H.  Placed back on vent to rest.  Dr Lozano in process of placing new line.  Antibiotics were deesculated yesterday to Cefazolin.  No cx were taken at surgery.  5/20: Pt was febrile yest afternoon 38.2, but now afebrile overnight after meropenem. WBC improved 11k->8k. Fio2 30%. Phenylephrine now off this morning. CXR shows new R consolidations.   5/21: Off pressors.  On vent: FiO2 30%, PEEP 8, PS 5 Received pRBCs yesterday.  5/22: Remains afebrile, off pressors. FiO2 30%.   5/23: Afebrile. FiO2 30%. Bronchoscopy yesterday normal.   5/24: No new cultures obtained at last bronchoscopy.  On  T piece this am: FiO2 30%.  5/25: Remaining afebrile. WBC 8600.   5/26: Chest tube removed today. Possible transfer to [facility].  5/27: O2 sat usuallyl 95% but intermittent desat to 87-89%.  Remaining afebrile.WBC 6100. Alk phos 641 but normal transaminases.    5/28: Pending [facility] transfer. NO new issues overnight  5/29: Awaiting bed at [facility]. Remaining afebrile. WBC 5800, creat 0.19.  5/31. Remaining afebrile. WBC 6000. . Remains weak and frail. Alk Phos 694 with normal transaminases. Slightly improved bilateral patchy infiltrates.   6/1: She is anxious to move on but still waiting for insurance approval to [facility].  6/2: Still awaiting bed at [facility]. Remaining afebrile. WBC 8300. Alk Phos 705 but transaminases normal and bilirubine 0.4.  6/4: Awaiting insurance auth before bed can be assigned at [facility]. Remaining afebrile. Extensive folliculitis over back and buttocks.  WBC 8300 on 6/2. Last  on 5/31.  6/5: Her ESR is down to 53 from 102 a good sign hopefully. She continues to work on her strength by walking.  6/6: Speech evaluation to check her swallow as she is stronger and can't stand pureed.  6/7: [facility] was denied by insurance. Could she go to full rehab now? Swallow eval pending.clotrimazole for vaginal drainage and itch.  6/8: Continued to be declined by insurances. No issues clinically. Physical Med declined for rehab. No fevers.  6/9: OT will recommend home PT/OT for her once he finishes her antibiotics.  6/10: Per pt and CM and OT, patient does not need SNF anymore may go straight home.   6/11: Her repeat chest CT shows an 8X6 fluid collection we will see if IR can drain the fluid before she is D/C'd.  6/12: Pulm recommends CT surgery evaluation. This is pending.        Review of Systems  Review of Systems   All other systems reviewed and are negative.       Physical Exam  Temp:  [35.9 °C (96.7 °F)-36.8 °C (98.2 °F)] 35.9 °C (96.7 °F)  Pulse:  [] 79  Resp:  [16-20] 18  BP: ()/(59-66) 104/60  SpO2:  [90 %-98 %] 98 %    Physical Exam  Constitutional:       Appearance: Normal appearance.   HENT:      Head: Normocephalic.   Cardiovascular:      Rate and Rhythm: Normal rate and regular rhythm.      Heart sounds: No murmur heard.     Pulmonary:       Effort: Pulmonary effort is normal.      Comments: Diminished R base  Abdominal:      General: Abdomen is flat. Bowel sounds are normal. There is no distension.      Tenderness: There is no abdominal tenderness.   Skin:     General: Skin is warm.      Coloration: Skin is not jaundiced.   Neurological:      Mental Status: She is alert.     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       - Acute left empyema s/p chest tube placement 4/23, decortication 5/17, chest tube removal 5/26 - Klebsiella  -Multiple acute septic pulmonary emboli bilaterally  -Acute bilateral pneumonia due to above     --Pulmonary edema  -Pseudotumor cerebri with underlying  shunt: possible arachnoiditis  -Chronic migraine headaches  -History of autoimmune vasculitis  -Elevated alkaline phosphatase & bilirubin  -Acute encephalopathy due to sepsis      - Anemia due to above      - Acute fever on 5/19 likely from secondary VAP      - Secondary VAP R sided pneumonia      - decubitus ulcer sacral.           - R lung abscess 8x6 cm     Plan   Repeat Chest CT reveals R lung abscess quite large 8x6cm, likely residual from her previous infection. Will need thoracentesis, but more importantly pulmonology needs to evaluate, to see if chest tube is appropriate. Will require another long course of antibiotics. Will need to set up for ambulatory infusion     - pulmonology evaluated patient yesterday, and recommends CT surgery re-evaluation. Pending surgical evaluation  - will need to set up home infusion on  Monday.   - Continue cefazolin. Will need another 4 wk course from thora/chest tube possibly decortication date.   Will need suppression after therapy for her HW to include spinal stimulator,  shunt          - Given MSSA - Keflex appropriate     Dispo: go home. Given lung abscess, will need another 4 week course. Dispo to be determined based on surgical outcome     Case discussed with patient, RN. Pending discussion with Dr Chaudhry     30 min spent  Thank you for this consult

## 2021-06-13 NOTE — PROGRESS NOTES
Hospital Medicine Daily Progress Note    Date of Service  6/13/2021    Chief Complaint  50 y.o. female admitted 4/16/2021 with altered mental status    Hospital Course  This is a 50-year-old female with a past medical history of pseudotumor cerebri s/p  shunt implantation by Dr. 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  [facility] infectious disease is managing antibiotics for the patient.  Dr. Candelaria surgery removed the port on 4/12.  The patient was further identified to have endocarditis of the tricuspid valve, a lumbar puncture performed on 4/14 showed pleocytosis with negative Gram stain.  The patient suffered worsening leukocytosis and was found to have septic emboli per CT.  Initially a small pleural catheter was placed.  Significant loculations were encountered.  MSSA empyema was diagnosed.  Neurosurgery was consulted to comment on possibly removal of a  shunt, this was felt not appropriate at this time.  The patient remained on mechanical ventilation for 15 days, then a perc trach was placed.  The patient was eventually liberated from mechanical ventilation, the patient did have a right thoracoscopy and decortication of the lung performed on 4/28 by Dr. Ganser.  The patient was of identified to have multiple sources for MSSA including mitral valve, tricuspid valve vegetation, port, spinal stimulator,  shunt possibly.  Back to the OR for VAT and decortication on 5/17  Trach de-canulated 6/8       Interval Problem Update    ID Consult appreciated, Repeat Chest CT reveals R lung abscess quite large 8x6cm, likely residual from  her previous infection.    IR for thoracentesis,   Pulmonology (Dr Brannon) recommended to have surgical eval before we proceed with chest tube since patient had decorication done in April by Dr Ganser, who is not oncall today, on call surgeon recommended call [place] surgery team, pending [place] surgery team call back.       On ancef  Afebrile  No other complaints    Consultants/Specialty  ID  pulmonary    Code Status  Full Code    Disposition  Abx's stop date 6/14  Plan repeat CT chest prior to completion of Abx's    Review of Systems  Review of Systems   Constitutional: Negative for chills and fever.   HENT: Negative for sore throat.    Eyes: Negative for blurred vision and double vision.   Respiratory: Negative for cough and shortness of breath.    Cardiovascular: Negative for chest pain, palpitations and leg swelling.   Gastrointestinal: Negative for abdominal pain, diarrhea, nausea and vomiting.   Genitourinary: Negative for dysuria and urgency.   Musculoskeletal: Negative for back pain.   Skin: Negative for rash.   Neurological: Negative for dizziness, loss of consciousness and headaches.        Physical Exam  Temp:  [35.9 °C (96.7 °F)-36.7 °C (98.1 °F)] 35.9 °C (96.7 °F)  Pulse:  [] 79  Resp:  [16-20] 18  BP: ()/(59-66) 104/60  SpO2:  [90 %-98 %] 98 %    Physical Exam  Vitals reviewed.   Constitutional:       General: She is not in acute distress.     Appearance: Normal appearance. She is well-developed. She is not diaphoretic.   HENT:      Head: Normocephalic and atraumatic.   Eyes:      General: No scleral icterus.        Right eye: No discharge.         Left eye: No discharge.   Neck:      Vascular: No JVD.   Cardiovascular:      Rate and Rhythm: Normal rate and regular rhythm.      Heart sounds: Murmur heard.     Pulmonary:      Effort: Pulmonary effort is normal. No respiratory distress.      Breath sounds: No stridor. No wheezing or rales.   Abdominal:      Palpations: Abdomen is soft.    Musculoskeletal:         General: No tenderness.      Right lower leg: No edema.      Left lower leg: No edema.   Skin:     General: Skin is warm and dry.      Findings: No rash.   Neurological:      General: No focal deficit present.      Mental Status: She is alert and oriented to person, place, and time.   Psychiatric:         Mood and Affect: Mood normal.         Thought Content: Thought content normal.         Fluids    Intake/Output Summary (Last 24 hours) at 6/13/2021 0809  Last data filed at 6/13/2021 0400  Gross per 24 hour   Intake 600 ml   Output --   Net 600 ml       Laboratory                        Imaging  CT-CHEST (THORAX) WITH   Final Result      1.  Near complete resolution of multifocal bilateral pulmonary airspace process consistent with pneumonia      2.  Loculated right anterior and anterior/lateral rim-enhancing pleural fluid collection consistent with an empyema. Extent is from just below the lung apex to the diaphragm and the largest component measures 8.0 x 6.0 cm transversely      3.  Ill-defined left upper lobe pulmonary nodule which are most likely postinflammatory      4.  Hepatic steatosis and hepatomegaly                  DX-CHEST-PORTABLE (1 VIEW)   Final Result      1.  Interval removal of the right-sided chest tubes. No pneumothorax is identified.   2.  Airspace opacities in the right are mildly improved and may represent atelectasis/consolidation.   3.  There is likely a small right pleural effusion.   4.  Patchy opacities on the left are mildly improved.   5.  Interval removal of the enteric tube.      DX-CHEST-PORTABLE (1 VIEW)   Final Result         1. Stable lines and tubes.   2. Stable ill-defined bilateral pulmonary opacities, right worse than left. No large pleural effusions.         DX-CHEST-PORTABLE (1 VIEW)   Final Result      1.  Decrease in volume of right pleural fluid collection/empyema with 2 right chest tubes in place. No pneumothorax identified.      2.  No  focal consolidation in the left lung.      DX-CHEST-PORTABLE (1 VIEW)   Final Result      No significant interval change.      DX-CHEST-PORTABLE (1 VIEW)   Final Result         1.  Hazy right pulmonary infiltrates and/or effusion, stable compared to prior study.      DX-CHEST-PORTABLE (1 VIEW)   Final Result         1.  Hazy right pulmonary infiltrates and/or effusion, stable compared to prior study.      DX-CHEST-PORTABLE (1 VIEW)   Final Result      1.  All lines and tubes appear appropriately located      2.  Consolidation throughout the right lung consistent with pneumonia      DX-CHEST-PORTABLE (1 VIEW)   Final Result         1.  Hazy right pulmonary infiltrates and/or effusion, increased compared to prior study.      DX-ABDOMEN FOR TUBE PLACEMENT   Final Result      Feeding tube looped in the stomach.      DX-CHEST-PORTABLE (1 VIEW)   Final Result         1. Stable lines and tubes. No pneumothorax detected.   2. Persistent opacities in the right perihilar region and in the right lung periphery, similar to prior study. Left lung is essentially clear.      DX-CHEST-PORTABLE (1 VIEW)   Final Result      Postoperative changes of the right chest with placement of an additional chest tube. There is improved aeration in the right lung. No significant pleural effusion or definite pneumothorax.      Right IJ central venous catheter tip projects over the proximal right atrium.      Hypoinflation with interstitial and hazy pulmonary opacities.      DX-CHEST-PORTABLE (1 VIEW)   Final Result         1.  Hazy right pulmonary infiltrates and/or effusion, similar compared to prior study.      DX-ABDOMEN FOR TUBE PLACEMENT   Final Result      Feeding tube tip projects over the expected location the gastric antrum.      DX-CHEST-PORTABLE (1 VIEW)   Final Result         1.  Hazy right pulmonary infiltrates and/or effusion, similar compared to prior study.      DX-CHEST-LIMITED (1 VIEW)   Final Result      1.  No pneumothorax.  Only one chest tube is now seen in the right lung.   2.  The chest is otherwise stable.      DX-CHEST-PORTABLE (1 VIEW)   Final Result         1.  Hazy right pulmonary infiltrates and/or effusion, similar compared to prior study.      DX-CHEST-PORTABLE (1 VIEW)   Final Result         1.  Hazy right pulmonary infiltrates and/or effusion, similar compared to prior study.      DX-ABDOMEN FOR TUBE PLACEMENT   Final Result         1.  Nonspecific bowel gas pattern.   2.  Dobbhoff tube tip terminates overlying the expected location of the gastric antrum.   3.  Hazy bilateral lung base infiltrates, greater on the right.      DX-CHEST-PORTABLE (1 VIEW)   Final Result         1.  Hazy right pulmonary infiltrates and/or effusion, similar compared to prior study.      US-RUQ   Final Result         1.  Hepatomegaly   2.  Mild intrahepatic and extrahepatic biliary ductal dilatation, commonly associated with postcholecystectomy physiology, consider causes of biliary obstruction with additional workup as clinically appropriate      CT-CHEST,ABDOMEN,PELVIS WITH   Final Result      1.  Large empyema in the RIGHT hemithorax, slightly decreased in size from prior exam with chest tubes present.   2.  Consolidation remains.   3.  Multiple cavitary lesions again seen in the LEFT upper lobe, minimally decreased from prior, concerning for septic emboli.   4.  Supportive tubing is unchanged.   5.  Intrahepatic and extrahepatic biliary dilation, likely postoperative although distal stricture, stone or mass remain possibilities.   6.  Moderate pelvic fluid, nonspecific.   7.  No evidence of bowel obstruction or perforation.      DX-CHEST-PORTABLE (1 VIEW)   Final Result         1.  Hazy right pulmonary infiltrates and/or effusion, similar compared to prior study.      DX-CHEST-PORTABLE (1 VIEW)   Final Result         No significant interval change.            DX-CHEST-PORTABLE (1 VIEW)   Final Result         Interval removal of left central  venous catheter. Interval adjustment of right PICC with tip in the SVC.         DX-ABDOMEN FOR TUBE PLACEMENT   Final Result      Feeding tube tip at the mid to distal stomach.      IR-PICC LINE PLACEMENT W/ GUIDANCE > AGE 5   Final Result                  Ultrasound-guided PICC placement performed by qualified nursing staff as    above.          DX-CHEST-PORTABLE (1 VIEW)   Final Result      1.  Stable cardiopulmonary findings.   2.  See comments above regarding the right-sided PICC position.      DX-CHEST-PORTABLE (1 VIEW)   Final Result      Stable chest findings compared to 5/5.      DX-CHEST-PORTABLE (1 VIEW)   Final Result      Stable chest findings compared with 5/3.      DX-ABDOMEN FOR TUBE PLACEMENT   Final Result         Feeding tube with tip projecting over the expected area of the stomach.      DX-CHEST-PORTABLE (1 VIEW)   Final Result      Stable chest findings compared with prior.      DX-CHEST-PORTABLE (1 VIEW)   Final Result         1. No significant interval change.      US-EXTREMITY ARTERY LOWER UNILAT RIGHT   Final Result      DX-CHEST-PORTABLE (1 VIEW)   Final Result         1. No significant interval change.      IR-PICC LINE PLACEMENT W/ GUIDANCE > AGE 5   Final Result                  Ultrasound-guided PICC placement performed by qualified nursing staff as    above.          DX-CHEST-PORTABLE (1 VIEW)   Final Result         1.  Pulmonary edema and/or infiltrates, similar to prior study.   2.  Stable opacification right lung, likely effusion. Thoracostomy tubes are in place.   3.  Multiple cavitary appearing left pulmonary lesions, see CT performed April 27, 2021 for further characterization      DX-CHEST-PORTABLE (1 VIEW)   Final Result         1.  Pulmonary edema and/or infiltrates, similar to prior study.   2.  Single opacification right lung, likely effusion. Thoracostomy tubes are in place.   3.  Multiple cavitary appearing left pulmonary lesions, see CT performed April 27, 2021 for  further characterization   4.  Soft tissue gas in the right chest wall      DX-CHEST-PORTABLE (1 VIEW)   Final Result         1.  Pulmonary edema and/or infiltrates, similar to prior study.   2.  Increased opacification right lung, likely increased effusion. Thoracostomy tubes are in place.   3.  Multiple cavitary appearing left pulmonary lesions, see CT performed April 27, 2021 for further characterization   4.  Soft tissue gas in the right chest wall      DX-CHEST-PORTABLE (1 VIEW)   Final Result         1.  Pulmonary edema and/or infiltrates, similar to prior study.   2.  Right pneumothorax, stable compared to prior study, right thoracostomy tubes remain in place   3.  Multiple cavitary appearing left pulmonary lesions, see CT performed April 27, 2021 for further characterization   4.  Soft tissue gas in the right chest wall      DX-CHEST-PORTABLE (1 VIEW)   Final Result         1.  Pulmonary edema and/or infiltrates, similar to prior study.   2.  Right pneumothorax, interval decreased compared to prior study, interval placement of right thoracostomy tubes is noted.   3.  Multiple cavitary appearing left pulmonary lesions, see CT performed yesterday for further characterization   4.  Soft tissue gas in the right chest wall      DX-CHEST-PORTABLE (1 VIEW)   Final Result         1.  Pulmonary edema and/or infiltrates, similar to prior study.   2.  Right pneumothorax, stable compared to prior study, interval removal of right thoracostomy tube is noted.   3.  Multiple cavitary appearing left pulmonary lesions, see CT performed yesterday for further characterization   4.  Soft tissue gas in the right chest wall      DX-CHEST-LIMITED (1 VIEW)   Final Result         No significant interval change.      CT-CTA CHEST PULMONARY ARTERY W/ RECONS   Final Result         No CT evidence of pulmonary embolus.      Previous right chest tube were removed.      Grossly similar in size of the loculated right hydropneumothorax but  there is increased layering fluid component. Unchanged mild shift of the mediastinal to the left.      Redemonstration of a pigtail catheter in the medial left lung base. Grossly similar multiple cavitary lesions in the left lung.      US-EXTREMITY VENOUS LOWER BILAT   Final Result      POCT BUTTERFLY-DUPLEX SCAN EXTREMITY VEINS LIMITED   Final Result      DX-CHEST-PORTABLE (1 VIEW)   Final Result         1.  Pulmonary edema and/or infiltrates, similar to prior study.   2.  Right pneumothorax, somewhat decreased to prior study, interval removal of right thoracostomy tube is noted.   3.  Multiple cavitary appearing left pulmonary lesions, see CT performed yesterday for further characterization   4.  Soft tissue gas in the right chest wall      DX-CHEST-PORTABLE (1 VIEW)   Final Result         1.  Pulmonary edema and/or infiltrates, similar to prior study.   2.  Right pneumothorax, similar to prior study with thoracostomy tube in place.   3.  Multiple cavitary appearing left pulmonary lesions, see CT performed yesterday for further characterization      CT-CHEST (THORAX) W/O   Final Result      Large loculated right hydropneumothorax with interval increase in size of the pneumothorax and pleural fluid.      Right chest tube is in the posterior right thorax.      There is mediastinal shift to the left compatible with tension.      Small pericardial effusion.      Small loculated left pleural effusion with overlying atelectasis/consolidation.   Pigtail catheter is seen at the medial left lung base. Pleural effusion has decreased in size compared to prior.      There are multiple foci of air extending from the right lung to the pleural space suspicious for a bronchopleural fistula.      Multiple cavitary lesions bilaterally with mild interval decrease of the solid component of the cavitary nodules.      Noncavitary 3.1 cm masslike density is seen at the left lung base.      Findings may be related to septic emboli,  malignancy or multifocal abscesses. Short interval follow-up recommended.      Soft tissue air on the right is again seen.      Splenomegaly      Findings discussed with Leti uSn.      DX-CHEST-PORTABLE (1 VIEW)   Final Result      No significant change from prior exam.      CT-HEAD W/O   Final Result      1.  RIGHT frontal ventriculostomy catheter unchanged in position.   2.  Mild cortical atrophy.   3.  No intracranial hemorrhage.   4.  Apparent dural thickening overlying the clivus.  Consider further evaluation with contrast-enhanced MRI.      DX-CHEST-LIMITED (1 VIEW)   Final Result      Interval left basilar pigtail catheter with diminished atelectasis, pleural fluid      Stable right hydropneumothorax with thoracostomy tube in place, considerable soft tissue gas and partial lung collapse      IR-CHEST TUBE-EMPYEMA LEFT   Final Result      1.  CT GUIDED LEFT  10 Wolof PIGTAIL CHEST TUBE PLACEMENT FOR POSSIBLE EMPYEMA YIELDING OLD BLOODY FLUID.      2. A CHEST RADIOGRAPH IS FORTHCOMING.      EC-MICAH W/O CONT   Final Result      US-ABDOMEN LTD (SOFT TISSUE)   Final Result      No fluid seen surrounding the electronic implanted spinal stimulator device.      DX-CHEST-LIMITED (1 VIEW)   Final Result      1.  Large right hydropneumothorax with right-sided chest tube in place, likely stable to slightly decreased from prior study.   2.  Small left pleural effusion. Mild improved aeration in the left lung.   3.  Bilateral pulmonary opacities which could be related to combination of atelectasis, edema and/or infection..      CT-CTA HEAD WITH & W/O-POST PROCESS   Final Result      1.  Patent carotid and vertebral arteries.      2.  Again seen right frontal the jugular peritoneal shunt catheter. There is mild increase in volume of the lateral and third ventricles.      CT-CHEST (THORAX) W/O   Final Result      1.  Interval placement of a right-sided chest tube into a large loculated right-sided pleural effusion. There  is now seen a large right-sided hydropneumothorax in the area that previously seen fluid. The chest tube extends into that hydropneumothorax.    There is some residual pleural fluid seen as well. A hydropneumothorax is somewhat loculated with components most prominently inferiorly and medially.      2.  New loculated left pleural effusion.      3.  Again seen multiple bilateral cavitary pulmonary masses which are more prominent than previously seen with increasing cavitation and measure up to 3 cm size. Differential diagnosis includes septic emboli, multifocal abscesses and neoplasm.      4.  Large amount of subcutaneous emphysema on the right.      5.  Splenomegaly.      6.  Multiple support devices.      Fleischner Society pulmonary nodule recommendations:         DX-CHEST-LIMITED (1 VIEW)   Final Result      1.  Support devices as described above.      2.  Right chest tube present with a again seen right-sided pneumothorax, possibly increased in size and loculated.      3.  Worsening bilateral pulmonary infiltrates.      DX-ABDOMEN FOR TUBE PLACEMENT   Final Result      Cortrak feeding tube tip projects in the region of the duodenal bulb.      DX-CHEST-LIMITED (1 VIEW)   Final Result      Loculated right pneumothorax is decreased in size compared to prior.      Small left pleural effusion.      Small loculated right pleural effusion.      Extensive bilateral airspace opacities are not significantly changed.      Soft tissue air on the right is again noted.      DX-CHEST-LIMITED (1 VIEW)   Final Result      Decreased partially loculated right pleural fluid with increased pleural air. Right chest tube is in place.      Increased hazy opacity in the left lung possibly atelectasis.         US-ABDOMEN LTD (SOFT TISSUE)   Final Result      No drainable fluid collection.      DX-CHEST-PORTABLE (1 VIEW)   Final Result      Decreased partially loculated right pleural fluid with increased pleural air. Right chest tube is  in place.      No other significant change.      DX-CHEST-PORTABLE (1 VIEW)   Final Result         1.  Pulmonary edema and/or infiltrates are identified, which are somewhat decreased since the prior exam.   2.  Moderate loculated appearing right pleural effusion, slightly decreased since prior      DX-ABDOMEN FOR TUBE PLACEMENT   Final Result      Feeding tube tip at the distal stomach.      DX-CHEST-PORTABLE (1 VIEW)   Final Result         1.  New chest tube is noted in the right lower hemithorax.      2.  Right pleural effusion is again identified which appears similar to the prior exam.      3.  No new infiltrates or consolidations are identified.      DX-CHEST-PORTABLE (1 VIEW)   Final Result         1.  Pulmonary edema and/or infiltrates are identified, which are stable since the prior exam.   2.  Moderate loculated appearing right pleural effusion      DX-CHEST-PORTABLE (1 VIEW)   Final Result         No significant interval change.      POCT BUTTERFLY-ECHOCARDIOGRAM LTD W/O CONT    (Results Pending)   EC-ECHOCARDIOGRAM COMPLETE W/ CONT    (Results Pending)        Assessment/Plan  * Acute bacterial endocarditis- (present on admission)  Assessment & Plan  Mitral valve, tricuspid valve  MSSA  Continue Ancef through 6/14/2021    Vaginal itching  Assessment & Plan  Topical Chlortrimazole    Debility  Assessment & Plan  Acute on chronic  Recovering well: may need rehab vs DC with HH if she cont's to do well    Elevated alkaline phosphatase level- (present on admission)  Assessment & Plan  The patient not able to tolerate a MRCP at this time, ultrasound grossly unremarkable, observe    Agitation requiring sedation protocol- (present on admission)  Assessment & Plan  resolved    Goals of care, counseling/discussion  Assessment & Plan  Patient with overall high risk of morbidity and mortality given her gravely ill state and prior chronic medical conditions  Palliative care following    Spinal cord stimulator status-  (present on admission)  Assessment & Plan  Patient's stimulator is [employer]. It is FDA approved as MRI compatible, but the company has gone out of business, so there is no support team to assist with MRI.  Thus, if MRI were performed, our radiologists would need to protocol it correctly to manage the stimulator. The former rep from [employer] is Andre, [phone].  Information obtained from Dr. Mahoney's office, [phone].     Per  (5/4/2021), it is not MRI compatible unfortunately.     Thrombocytosis (HCC)  Assessment & Plan  Acute reaction since resolved    Anasarca- (present on admission)  Assessment & Plan  Improved    Pleural effusion, left  Assessment & Plan  resolved    Trapped lung  Assessment & Plan  Now s/p decortication   Chest tubes removed on 5/26, observe    Pulmonary abscess (HCC)  Assessment & Plan  From septic emboli  Now s/p decortication    Fever  Assessment & Plan  Resolved, monitor, antibiotic therapy    Pneumonia  Assessment & Plan  MSSA  Ancef per ID through 6/14  Repeat CT Chest showing some residual empyema: have DW ID and will plan to tap.  Prefer not to go back to the OR given the pt's prolonged and complex hospital course    History of vasculitis- (present on admission)  Assessment & Plan  Does not appear to be a current issue    Bacteremia due to methicillin susceptible Staphylococcus aureus (MSSA)- (present on admission)  Assessment & Plan  Ancef through 6/14    CSF pleocytosis- (present on admission)  Assessment & Plan  Infectious disease managing    Empyema of right pleural space (HCC)- (present on admission)  Assessment & Plan  Patient s/p multiple chest tubes, thoracotomy and decortication  Now s/p second decortication 5/17  Per thoracic surgery note, sutures to be removed on 6/9  Plan Abx's through 6/14      Repeat CT still shows empyema, ID requested US thoracocentesis, which less likely to be drained out due to empyema nature,  Pulmonology consult requested for necessity  of chest tube placement    Acute blood loss anemia  Assessment & Plan  Likely secondary to hemothorax  300 cc of blood removed after chest tube placed at [facility]  Hb has been stable around 9-10    Prolonged Q-T interval on ECG- (present on admission)  Assessment & Plan  Resolved after correcting metabolic issues  Cont to follow   Monitor electrolytes, acidosis etc    Acute respiratory failure with hypoxia (HCC)- (present on admission)  Assessment & Plan  Secondary to MSSA bacteremia, empyema, pulmonary septic emboli  S/p tracheostomy, trach is been capped for more than 24 hours, I discussed with respiratory therapy and evaluated the patient, we agreed decannulation is appropriate  Postacute planning    History of complicated migraines- (present on admission)  Assessment & Plan  Monitor    GERD (gastroesophageal reflux disease)- (present on admission)  Assessment & Plan  History of, as needed treatment    Hyponatremia  Assessment & Plan  Mild - asymptomatic  Monitor    Tachycardia  Assessment & Plan  Persistent, likely secondary with multiple underlying ongoing conditions  Monitor  Slowly trending down; now 80-90s    Pseudotumor cerebri  Assessment & Plan  History of  shunt, neurosurgery opted against removal    H/O: CVA (cerebrovascular accident)- (present on admission)  Assessment & Plan  Monitor    Chronic pain syndrome- (present on admission)  Assessment & Plan  Pre-existing, pain management    Port or reservoir infection  Assessment & Plan  S/p removal    Thrombocytopenia (HCC)- (present on admission)  Assessment & Plan  Transient, resolved    Anemia- (present on admission)  Assessment & Plan  Monitor, transfuse as indicated    Septic shock (HCC)- (present on admission)  Assessment & Plan  Sepsis is resolved    Sepsis (HCC)  Assessment & Plan  Sepsis has resolved    Hypomagnesemia  Assessment & Plan  Repeat lab tomorrow    Hypokalemia  Assessment & Plan  Monitor, replace and recheck    Anxiety-  (present on admission)  Assessment & Plan  Continue Cymbalta and Paxil  As needed Valium    S/P  shunt- (present on admission)  Assessment & Plan  History of, Dr. Oh felt there is currently no need to remove    Folliculitis  Assessment & Plan  Improved    Acute encephalopathy- (present on admission)  Assessment & Plan  Per neurosurgery no need to remove  shunt  Improved         VTE prophylaxis: enoxaparin

## 2021-06-14 LAB
ALBUMIN SERPL BCP-MCNC: 3 G/DL (ref 3.2–4.9)
ALBUMIN/GLOB SERPL: 0.7 G/DL
ALP SERPL-CCNC: 293 U/L (ref 30–99)
ALT SERPL-CCNC: <5 U/L (ref 2–50)
ANION GAP SERPL CALC-SCNC: 8 MMOL/L (ref 7–16)
AST SERPL-CCNC: 13 U/L (ref 12–45)
BASOPHILS # BLD AUTO: 0.4 % (ref 0–1.8)
BASOPHILS # BLD: 0.02 K/UL (ref 0–0.12)
BILIRUB SERPL-MCNC: 0.3 MG/DL (ref 0.1–1.5)
BUN SERPL-MCNC: 7 MG/DL (ref 8–22)
CALCIUM SERPL-MCNC: 8.9 MG/DL (ref 8.5–10.5)
CHLORIDE SERPL-SCNC: 93 MMOL/L (ref 96–112)
CO2 SERPL-SCNC: 26 MMOL/L (ref 20–33)
CREAT SERPL-MCNC: 0.27 MG/DL (ref 0.5–1.4)
EOSINOPHIL # BLD AUTO: 0.01 K/UL (ref 0–0.51)
EOSINOPHIL NFR BLD: 0.2 % (ref 0–6.9)
ERYTHROCYTE [DISTWIDTH] IN BLOOD BY AUTOMATED COUNT: 48.2 FL (ref 35.9–50)
GLOBULIN SER CALC-MCNC: 4.3 G/DL (ref 1.9–3.5)
GLUCOSE SERPL-MCNC: 76 MG/DL (ref 65–99)
HCT VFR BLD AUTO: 37.6 % (ref 37–47)
HGB BLD-MCNC: 11.8 G/DL (ref 12–16)
IMM GRANULOCYTES # BLD AUTO: 0.01 K/UL (ref 0–0.11)
IMM GRANULOCYTES NFR BLD AUTO: 0.2 % (ref 0–0.9)
LYMPHOCYTES # BLD AUTO: 1.34 K/UL (ref 1–4.8)
LYMPHOCYTES NFR BLD: 29.8 % (ref 22–41)
MCH RBC QN AUTO: 28.1 PG (ref 27–33)
MCHC RBC AUTO-ENTMCNC: 31.4 G/DL (ref 33.6–35)
MCV RBC AUTO: 89.5 FL (ref 81.4–97.8)
MONOCYTES # BLD AUTO: 0.32 K/UL (ref 0–0.85)
MONOCYTES NFR BLD AUTO: 7.1 % (ref 0–13.4)
NEUTROPHILS # BLD AUTO: 2.8 K/UL (ref 2–7.15)
NEUTROPHILS NFR BLD: 62.3 % (ref 44–72)
NRBC # BLD AUTO: 0 K/UL
NRBC BLD-RTO: 0 /100 WBC
PLATELET # BLD AUTO: 359 K/UL (ref 164–446)
PMV BLD AUTO: 9.5 FL (ref 9–12.9)
POTASSIUM SERPL-SCNC: 4.1 MMOL/L (ref 3.6–5.5)
PROT SERPL-MCNC: 7.3 G/DL (ref 6–8.2)
RBC # BLD AUTO: 4.2 M/UL (ref 4.2–5.4)
SODIUM SERPL-SCNC: 127 MMOL/L (ref 135–145)
WBC # BLD AUTO: 4.5 K/UL (ref 4.8–10.8)

## 2021-06-14 PROCEDURE — 700111 HCHG RX REV CODE 636 W/ 250 OVERRIDE (IP): Performed by: INTERNAL MEDICINE

## 2021-06-14 PROCEDURE — A9270 NON-COVERED ITEM OR SERVICE: HCPCS | Performed by: HOSPITALIST

## 2021-06-14 PROCEDURE — 99233 SBSQ HOSP IP/OBS HIGH 50: CPT | Performed by: STUDENT IN AN ORGANIZED HEALTH CARE EDUCATION/TRAINING PROGRAM

## 2021-06-14 PROCEDURE — 770006 HCHG ROOM/CARE - MED/SURG/GYN SEMI*

## 2021-06-14 PROCEDURE — 80053 COMPREHEN METABOLIC PANEL: CPT

## 2021-06-14 PROCEDURE — 700111 HCHG RX REV CODE 636 W/ 250 OVERRIDE (IP): Performed by: STUDENT IN AN ORGANIZED HEALTH CARE EDUCATION/TRAINING PROGRAM

## 2021-06-14 PROCEDURE — 85025 COMPLETE CBC W/AUTO DIFF WBC: CPT

## 2021-06-14 PROCEDURE — 700102 HCHG RX REV CODE 250 W/ 637 OVERRIDE(OP): Performed by: HOSPITALIST

## 2021-06-14 RX ORDER — DIPHENHYDRAMINE HCL 25 MG
25 TABLET ORAL EVERY 6 HOURS PRN
Status: DISCONTINUED | OUTPATIENT
Start: 2021-06-14 | End: 2021-06-18 | Stop reason: HOSPADM

## 2021-06-14 RX ORDER — CEFAZOLIN SODIUM 2 G/100ML
2 INJECTION, SOLUTION INTRAVENOUS EVERY 8 HOURS
Status: DISCONTINUED | OUTPATIENT
Start: 2021-06-14 | End: 2021-06-18 | Stop reason: HOSPADM

## 2021-06-14 RX ADMIN — ACETAMINOPHEN 500 MG: 325 TABLET ORAL at 15:20

## 2021-06-14 RX ADMIN — DIPHENHYDRAMINE HYDROCHLORIDE 50 MG: 50 INJECTION INTRAMUSCULAR; INTRAVENOUS at 20:37

## 2021-06-14 RX ADMIN — DULOXETINE HYDROCHLORIDE 60 MG: 60 CAPSULE, DELAYED RELEASE ORAL at 17:26

## 2021-06-14 RX ADMIN — RISPERIDONE 2 MG: 2 TABLET ORAL at 05:20

## 2021-06-14 RX ADMIN — DIVALPROEX SODIUM 500 MG: 125 CAPSULE ORAL at 05:20

## 2021-06-14 RX ADMIN — DULOXETINE HYDROCHLORIDE 60 MG: 60 CAPSULE, DELAYED RELEASE ORAL at 05:20

## 2021-06-14 RX ADMIN — OXYCODONE 5 MG: 5 TABLET ORAL at 06:29

## 2021-06-14 RX ADMIN — METHADONE HYDROCHLORIDE 30 MG: 10 TABLET ORAL at 05:21

## 2021-06-14 RX ADMIN — OXYCODONE 5 MG: 5 TABLET ORAL at 20:37

## 2021-06-14 RX ADMIN — CEFAZOLIN SODIUM 2 G: 2 INJECTION, SOLUTION INTRAVENOUS at 21:27

## 2021-06-14 RX ADMIN — DIPHENHYDRAMINE HYDROCHLORIDE 50 MG: 50 INJECTION INTRAMUSCULAR; INTRAVENOUS at 08:02

## 2021-06-14 RX ADMIN — PAROXETINE HYDROCHLORIDE 10 MG: 10 TABLET, FILM COATED ORAL at 05:20

## 2021-06-14 RX ADMIN — DIPHENHYDRAMINE HYDROCHLORIDE 50 MG: 50 INJECTION INTRAMUSCULAR; INTRAVENOUS at 00:36

## 2021-06-14 RX ADMIN — DOCUSATE SODIUM 50 MG AND SENNOSIDES 8.6 MG 2 TABLET: 8.6; 5 TABLET, FILM COATED ORAL at 17:26

## 2021-06-14 RX ADMIN — CEFAZOLIN SODIUM 2 G: 2 INJECTION, SOLUTION INTRAVENOUS at 14:19

## 2021-06-14 RX ADMIN — DOCUSATE SODIUM 50 MG AND SENNOSIDES 8.6 MG 2 TABLET: 8.6; 5 TABLET, FILM COATED ORAL at 05:20

## 2021-06-14 RX ADMIN — DIVALPROEX SODIUM 500 MG: 125 CAPSULE ORAL at 14:13

## 2021-06-14 RX ADMIN — OXYCODONE 5 MG: 5 TABLET ORAL at 14:11

## 2021-06-14 RX ADMIN — DIVALPROEX SODIUM 500 MG: 125 CAPSULE ORAL at 21:22

## 2021-06-14 RX ADMIN — ACETAMINOPHEN 500 MG: 325 TABLET ORAL at 21:23

## 2021-06-14 RX ADMIN — DIAZEPAM 5 MG: 5 TABLET ORAL at 21:22

## 2021-06-14 RX ADMIN — GABAPENTIN 300 MG: 300 CAPSULE ORAL at 14:12

## 2021-06-14 RX ADMIN — GABAPENTIN 300 MG: 300 CAPSULE ORAL at 05:20

## 2021-06-14 RX ADMIN — DIPHENHYDRAMINE HYDROCHLORIDE 50 MG: 50 INJECTION INTRAMUSCULAR; INTRAVENOUS at 14:13

## 2021-06-14 RX ADMIN — RISPERIDONE 2 MG: 2 TABLET ORAL at 17:26

## 2021-06-14 RX ADMIN — CEFAZOLIN SODIUM 2 G: 2 INJECTION, SOLUTION INTRAVENOUS at 05:28

## 2021-06-14 RX ADMIN — ENOXAPARIN SODIUM 40 MG: 40 INJECTION SUBCUTANEOUS at 05:20

## 2021-06-14 RX ADMIN — OXYCODONE 5 MG: 5 TABLET ORAL at 00:36

## 2021-06-14 RX ADMIN — ACETAMINOPHEN 500 MG: 325 TABLET ORAL at 09:43

## 2021-06-14 RX ADMIN — GABAPENTIN 300 MG: 300 CAPSULE ORAL at 21:22

## 2021-06-14 ASSESSMENT — ENCOUNTER SYMPTOMS
COUGH: 0
HEADACHES: 0
FEVER: 0
BLURRED VISION: 0
CHILLS: 0
PALPITATIONS: 0
DIZZINESS: 0
BACK PAIN: 0
DOUBLE VISION: 0
SORE THROAT: 0
NAUSEA: 0
ABDOMINAL PAIN: 0
VOMITING: 0
DIARRHEA: 0
LOSS OF CONSCIOUSNESS: 0
SHORTNESS OF BREATH: 0

## 2021-06-14 ASSESSMENT — PAIN DESCRIPTION - PAIN TYPE
TYPE: ACUTE PAIN

## 2021-06-14 ASSESSMENT — FIBROSIS 4 INDEX: FIB4 SCORE: 0.82

## 2021-06-14 NOTE — CARE PLAN
The patient is Watcher - Medium risk of patient condition declining or worsening    Shift Goals  Clinical Goals: Pain managment  Patient Goals: Pain   Family Goals: NA    Progress made toward(s) clinical / shift goals:  Pain level was managed with administration of oxycodone.    Patient is not progressing towards the following goals:

## 2021-06-14 NOTE — PROGRESS NOTES
Hospital Medicine Daily Progress Note    Date of Service  6/14/2021    Chief Complaint  50 y.o. female admitted 4/16/2021 with altered mental status    Hospital Course  This is a 50-year-old female with a past medical history of pseudotumor cerebri s/p  shunt implantation by Dr. 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  [facility] infectious disease is managing antibiotics for the patient.  Dr. Candelaria surgery removed the port on 4/12.  The patient was further identified to have endocarditis of the tricuspid valve, a lumbar puncture performed on 4/14 showed pleocytosis with negative Gram stain.  The patient suffered worsening leukocytosis and was found to have septic emboli per CT.  Initially a small pleural catheter was placed.  Significant loculations were encountered.  MSSA empyema was diagnosed.  Neurosurgery was consulted to comment on possibly removal of a  shunt, this was felt not appropriate at this time.  The patient remained on mechanical ventilation for 15 days, then a perc trach was placed.  The patient was eventually liberated from mechanical ventilation, the patient did have a right thoracoscopy and decortication of the lung performed on 4/28 by Dr. Ganser.  The patient was of identified to have multiple sources for MSSA including mitral valve, tricuspid valve vegetation, port, spinal stimulator,  shunt possibly.  Back to the OR for VAT and decortication on 5/17  Trach de-canulated 6/8       Interval Problem Update    ID Consult appreciated, Repeat Chest CT reveals R lung abscess quite large 8x6cm, likely residual from  her previous infection.    IR for thoracentesis,   Pulmonology (Dr Brannon) recommended to have surgical eval before we proceed with chest tube   Per surgeon since patient had decorication done in April and right side thoracotomy in may, wont be able to get a chest tube in, recommended IR for draining tube, ordered    NPO for procedure    On ancef  Afebrile  No other complaints    Consultants/Specialty  ID  Pulmonary  surgeon    Code Status  Full Code    Disposition  Abx's stop date 6/14  Plan repeat CT chest prior to completion of Abx's    Review of Systems  Review of Systems   Constitutional: Negative for chills and fever.   HENT: Negative for sore throat.    Eyes: Negative for blurred vision and double vision.   Respiratory: Negative for cough and shortness of breath.    Cardiovascular: Negative for chest pain, palpitations and leg swelling.   Gastrointestinal: Negative for abdominal pain, diarrhea, nausea and vomiting.   Genitourinary: Negative for dysuria and urgency.   Musculoskeletal: Negative for back pain.   Skin: Negative for rash.   Neurological: Negative for dizziness, loss of consciousness and headaches.        Physical Exam  Temp:  [36.1 °C (97 °F)] 36.1 °C (97 °F)  Pulse:  [81-95] 81  Resp:  [16-18] 16  BP: ()/(51-67) 95/51  SpO2:  [92 %-96 %] 95 %    Physical Exam  Vitals reviewed.   Constitutional:       General: She is not in acute distress.     Appearance: Normal appearance. She is well-developed. She is not diaphoretic.   HENT:      Head: Normocephalic and atraumatic.   Eyes:      General: No scleral icterus.        Right eye: No discharge.         Left eye: No discharge.   Neck:      Vascular: No JVD.   Cardiovascular:      Rate and Rhythm: Normal rate and regular rhythm.      Heart sounds: Murmur heard.     Pulmonary:      Effort: Pulmonary effort is normal. No respiratory distress.      Breath sounds: No stridor. No wheezing or rales.   Abdominal:      Palpations: Abdomen is soft.    Musculoskeletal:         General: No tenderness.      Right lower leg: No edema.      Left lower leg: No edema.      Comments: Right side chest thoracotomy scar with suture.   Skin:     General: Skin is warm and dry.      Findings: No rash (butt).   Neurological:      General: No focal deficit present.      Mental Status: She is alert and oriented to person, place, and time.   Psychiatric:         Mood and Affect: Mood normal.         Thought Content: Thought content normal.         Fluids    Intake/Output Summary (Last 24 hours) at 6/14/2021 0751  Last data filed at 6/13/2021 1935  Gross per 24 hour   Intake 240 ml   Output --   Net 240 ml       Laboratory  Recent Labs     06/14/21  0055   WBC 4.5*   RBC 4.20   HEMOGLOBIN 11.8*   HEMATOCRIT 37.6   MCV 89.5   MCH 28.1   MCHC 31.4*   RDW 48.2   PLATELETCT 359   MPV 9.5     Recent Labs     06/14/21  0055   SODIUM 127*   POTASSIUM 4.1   CHLORIDE 93*   CO2 26   GLUCOSE 76   BUN 7*   CREATININE 0.27*   CALCIUM 8.9                   Imaging  EC-ECHOCARDIOGRAM COMPLETE W/ CONT   Final Result      CT-CHEST (THORAX) WITH   Final Result      1.  Near complete resolution of multifocal bilateral pulmonary airspace process consistent with pneumonia      2.  Loculated right anterior and anterior/lateral rim-enhancing pleural fluid collection consistent with an empyema. Extent is from just below the lung apex to the diaphragm and the largest component measures 8.0 x 6.0 cm transversely      3.  Ill-defined left upper lobe pulmonary nodule which are most likely postinflammatory      4.  Hepatic steatosis and hepatomegaly                  DX-CHEST-PORTABLE (1 VIEW)   Final Result      1.  Interval removal of the right-sided chest tubes. No pneumothorax is identified.   2.  Airspace opacities in the right are mildly improved and may represent atelectasis/consolidation.   3.  There is likely a small right pleural effusion.   4.  Patchy opacities on the left are mildly improved.   5.   Interval removal of the enteric tube.      DX-CHEST-PORTABLE (1 VIEW)   Final Result         1. Stable lines and tubes.   2. Stable ill-defined bilateral pulmonary opacities, right worse than left. No large pleural effusions.         DX-CHEST-PORTABLE (1 VIEW)   Final Result      1.  Decrease in volume of right pleural fluid collection/empyema with 2 right chest tubes in place. No pneumothorax identified.      2.  No focal consolidation in the left lung.      DX-CHEST-PORTABLE (1 VIEW)   Final Result      No significant interval change.      DX-CHEST-PORTABLE (1 VIEW)   Final Result         1.  Hazy right pulmonary infiltrates and/or effusion, stable compared to prior study.      DX-CHEST-PORTABLE (1 VIEW)   Final Result         1.  Hazy right pulmonary infiltrates and/or effusion, stable compared to prior study.      DX-CHEST-PORTABLE (1 VIEW)   Final Result      1.  All lines and tubes appear appropriately located      2.  Consolidation throughout the right lung consistent with pneumonia      DX-CHEST-PORTABLE (1 VIEW)   Final Result         1.  Hazy right pulmonary infiltrates and/or effusion, increased compared to prior study.      DX-ABDOMEN FOR TUBE PLACEMENT   Final Result      Feeding tube looped in the stomach.      DX-CHEST-PORTABLE (1 VIEW)   Final Result         1. Stable lines and tubes. No pneumothorax detected.   2. Persistent opacities in the right perihilar region and in the right lung periphery, similar to prior study. Left lung is essentially clear.      DX-CHEST-PORTABLE (1 VIEW)   Final Result      Postoperative changes of the right chest with placement of an additional chest tube. There is improved aeration in the right lung. No significant pleural effusion or definite pneumothorax.      Right IJ central venous catheter tip projects over the proximal right atrium.      Hypoinflation with interstitial and hazy pulmonary opacities.      DX-CHEST-PORTABLE (1 VIEW)   Final Result         1.  Hazy  right pulmonary infiltrates and/or effusion, similar compared to prior study.      DX-ABDOMEN FOR TUBE PLACEMENT   Final Result      Feeding tube tip projects over the expected location the gastric antrum.      DX-CHEST-PORTABLE (1 VIEW)   Final Result         1.  Hazy right pulmonary infiltrates and/or effusion, similar compared to prior study.      DX-CHEST-LIMITED (1 VIEW)   Final Result      1.  No pneumothorax. Only one chest tube is now seen in the right lung.   2.  The chest is otherwise stable.      DX-CHEST-PORTABLE (1 VIEW)   Final Result         1.  Hazy right pulmonary infiltrates and/or effusion, similar compared to prior study.      DX-CHEST-PORTABLE (1 VIEW)   Final Result         1.  Hazy right pulmonary infiltrates and/or effusion, similar compared to prior study.      DX-ABDOMEN FOR TUBE PLACEMENT   Final Result         1.  Nonspecific bowel gas pattern.   2.  Dobbhoff tube tip terminates overlying the expected location of the gastric antrum.   3.  Hazy bilateral lung base infiltrates, greater on the right.      DX-CHEST-PORTABLE (1 VIEW)   Final Result         1.  Hazy right pulmonary infiltrates and/or effusion, similar compared to prior study.      US-RUQ   Final Result         1.  Hepatomegaly   2.  Mild intrahepatic and extrahepatic biliary ductal dilatation, commonly associated with postcholecystectomy physiology, consider causes of biliary obstruction with additional workup as clinically appropriate      CT-CHEST,ABDOMEN,PELVIS WITH   Final Result      1.  Large empyema in the RIGHT hemithorax, slightly decreased in size from prior exam with chest tubes present.   2.  Consolidation remains.   3.  Multiple cavitary lesions again seen in the LEFT upper lobe, minimally decreased from prior, concerning for septic emboli.   4.  Supportive tubing is unchanged.   5.  Intrahepatic and extrahepatic biliary dilation, likely postoperative although distal stricture, stone or mass remain possibilities.    6.  Moderate pelvic fluid, nonspecific.   7.  No evidence of bowel obstruction or perforation.      DX-CHEST-PORTABLE (1 VIEW)   Final Result         1.  Hazy right pulmonary infiltrates and/or effusion, similar compared to prior study.      DX-CHEST-PORTABLE (1 VIEW)   Final Result         No significant interval change.            DX-CHEST-PORTABLE (1 VIEW)   Final Result         Interval removal of left central venous catheter. Interval adjustment of right PICC with tip in the SVC.         DX-ABDOMEN FOR TUBE PLACEMENT   Final Result      Feeding tube tip at the mid to distal stomach.      IR-PICC LINE PLACEMENT W/ GUIDANCE > AGE 5   Final Result                  Ultrasound-guided PICC placement performed by qualified nursing staff as    above.          DX-CHEST-PORTABLE (1 VIEW)   Final Result      1.  Stable cardiopulmonary findings.   2.  See comments above regarding the right-sided PICC position.      DX-CHEST-PORTABLE (1 VIEW)   Final Result      Stable chest findings compared to 5/5.      DX-CHEST-PORTABLE (1 VIEW)   Final Result      Stable chest findings compared with 5/3.      DX-ABDOMEN FOR TUBE PLACEMENT   Final Result         Feeding tube with tip projecting over the expected area of the stomach.      DX-CHEST-PORTABLE (1 VIEW)   Final Result      Stable chest findings compared with prior.      DX-CHEST-PORTABLE (1 VIEW)   Final Result         1. No significant interval change.      US-EXTREMITY ARTERY LOWER UNILAT RIGHT   Final Result      DX-CHEST-PORTABLE (1 VIEW)   Final Result         1. No significant interval change.      IR-PICC LINE PLACEMENT W/ GUIDANCE > AGE 5   Final Result                  Ultrasound-guided PICC placement performed by qualified nursing staff as    above.          DX-CHEST-PORTABLE (1 VIEW)   Final Result         1.  Pulmonary edema and/or infiltrates, similar to prior study.   2.  Stable opacification right lung, likely effusion. Thoracostomy tubes are in place.   3.   Multiple cavitary appearing left pulmonary lesions, see CT performed April 27, 2021 for further characterization      DX-CHEST-PORTABLE (1 VIEW)   Final Result         1.  Pulmonary edema and/or infiltrates, similar to prior study.   2.  Single opacification right lung, likely effusion. Thoracostomy tubes are in place.   3.  Multiple cavitary appearing left pulmonary lesions, see CT performed April 27, 2021 for further characterization   4.  Soft tissue gas in the right chest wall      DX-CHEST-PORTABLE (1 VIEW)   Final Result         1.  Pulmonary edema and/or infiltrates, similar to prior study.   2.  Increased opacification right lung, likely increased effusion. Thoracostomy tubes are in place.   3.  Multiple cavitary appearing left pulmonary lesions, see CT performed April 27, 2021 for further characterization   4.  Soft tissue gas in the right chest wall      DX-CHEST-PORTABLE (1 VIEW)   Final Result         1.  Pulmonary edema and/or infiltrates, similar to prior study.   2.  Right pneumothorax, stable compared to prior study, right thoracostomy tubes remain in place   3.  Multiple cavitary appearing left pulmonary lesions, see CT performed April 27, 2021 for further characterization   4.  Soft tissue gas in the right chest wall      DX-CHEST-PORTABLE (1 VIEW)   Final Result         1.  Pulmonary edema and/or infiltrates, similar to prior study.   2.  Right pneumothorax, interval decreased compared to prior study, interval placement of right thoracostomy tubes is noted.   3.  Multiple cavitary appearing left pulmonary lesions, see CT performed yesterday for further characterization   4.  Soft tissue gas in the right chest wall      DX-CHEST-PORTABLE (1 VIEW)   Final Result         1.  Pulmonary edema and/or infiltrates, similar to prior study.   2.  Right pneumothorax, stable compared to prior study, interval removal of right thoracostomy tube is noted.   3.  Multiple cavitary appearing left pulmonary lesions,  see CT performed yesterday for further characterization   4.  Soft tissue gas in the right chest wall      DX-CHEST-LIMITED (1 VIEW)   Final Result         No significant interval change.      CT-CTA CHEST PULMONARY ARTERY W/ RECONS   Final Result         No CT evidence of pulmonary embolus.      Previous right chest tube were removed.      Grossly similar in size of the loculated right hydropneumothorax but there is increased layering fluid component. Unchanged mild shift of the mediastinal to the left.      Redemonstration of a pigtail catheter in the medial left lung base. Grossly similar multiple cavitary lesions in the left lung.      US-EXTREMITY VENOUS LOWER BILAT   Final Result      POCT BUTTERFLY-DUPLEX SCAN EXTREMITY VEINS LIMITED   Final Result      DX-CHEST-PORTABLE (1 VIEW)   Final Result         1.  Pulmonary edema and/or infiltrates, similar to prior study.   2.  Right pneumothorax, somewhat decreased to prior study, interval removal of right thoracostomy tube is noted.   3.  Multiple cavitary appearing left pulmonary lesions, see CT performed yesterday for further characterization   4.  Soft tissue gas in the right chest wall      DX-CHEST-PORTABLE (1 VIEW)   Final Result         1.  Pulmonary edema and/or infiltrates, similar to prior study.   2.  Right pneumothorax, similar to prior study with thoracostomy tube in place.   3.  Multiple cavitary appearing left pulmonary lesions, see CT performed yesterday for further characterization      CT-CHEST (THORAX) W/O   Final Result      Large loculated right hydropneumothorax with interval increase in size of the pneumothorax and pleural fluid.      Right chest tube is in the posterior right thorax.      There is mediastinal shift to the left compatible with tension.      Small pericardial effusion.      Small loculated left pleural effusion with overlying atelectasis/consolidation.   Pigtail catheter is seen at the medial left lung base. Pleural effusion  has decreased in size compared to prior.      There are multiple foci of air extending from the right lung to the pleural space suspicious for a bronchopleural fistula.      Multiple cavitary lesions bilaterally with mild interval decrease of the solid component of the cavitary nodules.      Noncavitary 3.1 cm masslike density is seen at the left lung base.      Findings may be related to septic emboli, malignancy or multifocal abscesses. Short interval follow-up recommended.      Soft tissue air on the right is again seen.      Splenomegaly      Findings discussed with Leti Sun.      DX-CHEST-PORTABLE (1 VIEW)   Final Result      No significant change from prior exam.      CT-HEAD W/O   Final Result      1.  RIGHT frontal ventriculostomy catheter unchanged in position.   2.  Mild cortical atrophy.   3.  No intracranial hemorrhage.   4.  Apparent dural thickening overlying the clivus.  Consider further evaluation with contrast-enhanced MRI.      DX-CHEST-LIMITED (1 VIEW)   Final Result      Interval left basilar pigtail catheter with diminished atelectasis, pleural fluid      Stable right hydropneumothorax with thoracostomy tube in place, considerable soft tissue gas and partial lung collapse      IR-CHEST TUBE-EMPYEMA LEFT   Final Result      1.  CT GUIDED LEFT  10 Upper sorbian PIGTAIL CHEST TUBE PLACEMENT FOR POSSIBLE EMPYEMA YIELDING OLD BLOODY FLUID.      2. A CHEST RADIOGRAPH IS FORTHCOMING.      EC-MICAH W/O CONT   Final Result      US-ABDOMEN LTD (SOFT TISSUE)   Final Result      No fluid seen surrounding the electronic implanted spinal stimulator device.      DX-CHEST-LIMITED (1 VIEW)   Final Result      1.  Large right hydropneumothorax with right-sided chest tube in place, likely stable to slightly decreased from prior study.   2.  Small left pleural effusion. Mild improved aeration in the left lung.   3.  Bilateral pulmonary opacities which could be related to combination of atelectasis, edema and/or  infection..      CT-CTA HEAD WITH & W/O-POST PROCESS   Final Result      1.  Patent carotid and vertebral arteries.      2.  Again seen right frontal the jugular peritoneal shunt catheter. There is mild increase in volume of the lateral and third ventricles.      CT-CHEST (THORAX) W/O   Final Result      1.  Interval placement of a right-sided chest tube into a large loculated right-sided pleural effusion. There is now seen a large right-sided hydropneumothorax in the area that previously seen fluid. The chest tube extends into that hydropneumothorax.    There is some residual pleural fluid seen as well. A hydropneumothorax is somewhat loculated with components most prominently inferiorly and medially.      2.  New loculated left pleural effusion.      3.  Again seen multiple bilateral cavitary pulmonary masses which are more prominent than previously seen with increasing cavitation and measure up to 3 cm size. Differential diagnosis includes septic emboli, multifocal abscesses and neoplasm.      4.  Large amount of subcutaneous emphysema on the right.      5.  Splenomegaly.      6.  Multiple support devices.      Fleischner Society pulmonary nodule recommendations:         DX-CHEST-LIMITED (1 VIEW)   Final Result      1.  Support devices as described above.      2.  Right chest tube present with a again seen right-sided pneumothorax, possibly increased in size and loculated.      3.  Worsening bilateral pulmonary infiltrates.      DX-ABDOMEN FOR TUBE PLACEMENT   Final Result      Cortrak feeding tube tip projects in the region of the duodenal bulb.      DX-CHEST-LIMITED (1 VIEW)   Final Result      Loculated right pneumothorax is decreased in size compared to prior.      Small left pleural effusion.      Small loculated right pleural effusion.      Extensive bilateral airspace opacities are not significantly changed.      Soft tissue air on the right is again noted.      DX-CHEST-LIMITED (1 VIEW)   Final Result       Decreased partially loculated right pleural fluid with increased pleural air. Right chest tube is in place.      Increased hazy opacity in the left lung possibly atelectasis.         US-ABDOMEN LTD (SOFT TISSUE)   Final Result      No drainable fluid collection.      DX-CHEST-PORTABLE (1 VIEW)   Final Result      Decreased partially loculated right pleural fluid with increased pleural air. Right chest tube is in place.      No other significant change.      DX-CHEST-PORTABLE (1 VIEW)   Final Result         1.  Pulmonary edema and/or infiltrates are identified, which are somewhat decreased since the prior exam.   2.  Moderate loculated appearing right pleural effusion, slightly decreased since prior      DX-ABDOMEN FOR TUBE PLACEMENT   Final Result      Feeding tube tip at the distal stomach.      DX-CHEST-PORTABLE (1 VIEW)   Final Result         1.  New chest tube is noted in the right lower hemithorax.      2.  Right pleural effusion is again identified which appears similar to the prior exam.      3.  No new infiltrates or consolidations are identified.      DX-CHEST-PORTABLE (1 VIEW)   Final Result         1.  Pulmonary edema and/or infiltrates are identified, which are stable since the prior exam.   2.  Moderate loculated appearing right pleural effusion      DX-CHEST-PORTABLE (1 VIEW)   Final Result         No significant interval change.      POCT BUTTERFLY-ECHOCARDIOGRAM LTD W/O CONT    (Results Pending)        Assessment/Plan  * Acute bacterial endocarditis- (present on admission)  Assessment & Plan  Mitral valve, tricuspid valve  MSSA  Continue Ancef through 6/14/2021    Vaginal itching  Assessment & Plan  Topical Chlortrimazole    Debility  Assessment & Plan  Acute on chronic  Recovering well: may need rehab vs DC with HH if she cont's to do well    Elevated alkaline phosphatase level- (present on admission)  Assessment & Plan  The patient not able to tolerate a MRCP at this time, ultrasound grossly  unremarkable, observe    Agitation requiring sedation protocol- (present on admission)  Assessment & Plan  resolved    Goals of care, counseling/discussion  Assessment & Plan  Patient with overall high risk of morbidity and mortality given her gravely ill state and prior chronic medical conditions  Palliative care following    Spinal cord stimulator status- (present on admission)  Assessment & Plan  Patient's stimulator is [employer]. It is FDA approved as MRI compatible, but the company has gone out of business, so there is no support team to assist with MRI.  Thus, if MRI were performed, our radiologists would need to protocol it correctly to manage the stimulator. The former rep from [employer] is Andre, [phone].  Information obtained from Dr. Mahoney's office, [phone].     Per  (5/4/2021), it is not MRI compatible unfortunately.     Thrombocytosis (HCC)  Assessment & Plan  Acute reaction since resolved    Anasarca- (present on admission)  Assessment & Plan  Improved    Pleural effusion, left  Assessment & Plan  resolved    Trapped lung  Assessment & Plan  Now s/p decortication   Chest tubes removed on 5/26, observe    Pulmonary abscess (HCC)  Assessment & Plan  From septic emboli  Now s/p decortication    Fever  Assessment & Plan  Resolved, monitor, antibiotic therapy    Pneumonia  Assessment & Plan  MSSA  Ancef per ID through 6/14  Repeat CT Chest showing some residual empyema: have DW ID and will plan to tap.  Prefer not to go back to the OR given the pt's prolonged and complex hospital course    History of vasculitis- (present on admission)  Assessment & Plan  Does not appear to be a current issue    Bacteremia due to methicillin susceptible Staphylococcus aureus (MSSA)- (present on admission)  Assessment & Plan  Ancef through 6/14    CSF pleocytosis- (present on admission)  Assessment & Plan  Infectious disease managing    Empyema of right pleural space (HCC)- (present on admission)  Assessment &  Plan  Patient s/p multiple chest tubes, thoracotomy and decortication  Now s/p second decortication 5/17  Per thoracic surgery note, sutures to be removed on 6/9  Plan Abx's through 6/14      Repeat CT still shows empyema, ID requested US thoracocentesis, which less likely to be drained out due to empyema nature,  Pulmonology consult and surgery recommendation: IR drain    Acute blood loss anemia  Assessment & Plan  Likely secondary to hemothorax  300 cc of blood removed after chest tube placed at [facility]  Hb has been stable around 9-10    Prolonged Q-T interval on ECG- (present on admission)  Assessment & Plan  Resolved after correcting metabolic issues  Cont to follow   Monitor electrolytes, acidosis etc    Acute respiratory failure with hypoxia (HCC)- (present on admission)  Assessment & Plan  Secondary to MSSA bacteremia, empyema, pulmonary septic emboli  S/p tracheostomy, trach is been capped for more than 24 hours, I discussed with respiratory therapy and evaluated the patient, we agreed decannulation is appropriate  Postacute planning    History of complicated migraines- (present on admission)  Assessment & Plan  Monitor    GERD (gastroesophageal reflux disease)- (present on admission)  Assessment & Plan  History of, as needed treatment    Hyponatremia  Assessment & Plan  Mild - asymptomatic  Monitor    Tachycardia  Assessment & Plan  Persistent, likely secondary with multiple underlying ongoing conditions  Monitor  Slowly trending down; now 80-90s    Pseudotumor cerebri  Assessment & Plan  History of  shunt, neurosurgery opted against removal    H/O: CVA (cerebrovascular accident)- (present on admission)  Assessment & Plan  Monitor    Chronic pain syndrome- (present on admission)  Assessment & Plan  Pre-existing, pain management    Port or reservoir infection  Assessment & Plan  S/p removal    Thrombocytopenia (HCC)- (present on admission)  Assessment & Plan  Transient, resolved    Anemia- (present  on admission)  Assessment & Plan  Monitor, transfuse as indicated    Septic shock (HCC)- (present on admission)  Assessment & Plan  Sepsis is resolved    Sepsis (HCC)  Assessment & Plan  Sepsis has resolved    Hypomagnesemia  Assessment & Plan  Repeat lab tomorrow    Hypokalemia  Assessment & Plan  Monitor, replace and recheck    Anxiety- (present on admission)  Assessment & Plan  Continue Cymbalta and Paxil  As needed Valium    S/P  shunt- (present on admission)  Assessment & Plan  History of, Dr. Oh felt there is currently no need to remove    Folliculitis  Assessment & Plan  Improved    Acute encephalopathy- (present on admission)  Assessment & Plan  Per neurosurgery no need to remove  shunt  Improved         VTE prophylaxis: enoxaparin on hold for IR drain  scd

## 2021-06-14 NOTE — CARE PLAN
Problem: Nutritional:  Goal: Achieve adequate nutritional intake  Description: Patient will consume 50% of meals  Outcome: Progressing     Pt noted with significantly improved PO in the last 3 days. PO > 50% of all meals x 8 meals documented.     RD continues to follow for adequate intake.

## 2021-06-14 NOTE — FACE TO FACE
Face to Face Supporting Documentation - Home Health    The encounter with this patient was in whole or in part the primary reason for home health admission.    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 Leaving home requires a considerable and taxing effort. There is a normal inability to leave the home.    Community Physician to provide follow up care: Elliott Power M.D.     Optional Interventions? No      I certify the face to face encounter for this home health care referral meets the CMS requirements and the encounter/clinical assessment with the patient was, in whole, or in part, for the medical condition(s) listed above, which is the primary reason for home health care. Based on my clinical findings: the service(s) are medically necessary, support the need for home health care, and the homebound criteria are met.  I certify that this patient has had a face to face encounter by myself and the clinical nurse specialist working collaboratively with me.  Raphael Chaudhry M.D. - NPI: [id number]

## 2021-06-14 NOTE — DISCHARGE PLANNING
Medical Social Work    Anticipated Discharge Disposition: Home w/ Home Health and outpt Infusion w/ [facility].     Action: LSW notified by Infectious Disease that pt has had [facility] in the past and ID will send orders to [facility] for pt to be set up outpatient.     LSW requested HH order and F2F from MD. [facility] has already been sent.  DPA notified that HH order now available. DPA to send over orders and follow up on status of HH referral.     Barriers to Discharge: HH acceptance and medical clearance    Plan: Awaiting HH acceptance.

## 2021-06-14 NOTE — PROGRESS NOTES
Assessment/description of ears? Pink and blanching, intact  Which preventative measures are in place for the ears? Foam on O2    Assessment/description of elbows? Pink and blanching, intact  Which preventative measures are in place for the elbows? refusing mepielx, turns q2 independently/prn    Assessment/description of sacrum? Excoriated, pink and blanching, intact  Which preventative measures are in place for the sacrum? Patient refusing mepilex, barrier cream and t/p by staff. Patient turns self independently/ prn     Assessment/description of heels? Pink and blanching, intact. Patient with PI to right great toe and left third toe, noted by wound care, refusing dressings   Which preventative measures are in place for the heels?  Heels elevated on pillows, refusing mepielx, turns q2 independently/prn    Which devices are in place? PICC, nasal cannula   Description of skin under devices: Pink and blanching, intact.  Which preventative measures are in place under devices? Grey foam to O2 tubing     Other: patient refusing to turn with staff. Back intact with scattered erythema    patient refusing dressing to previous incision sites

## 2021-06-14 NOTE — PROGRESS NOTES
Bedside report received. Pt is A&O X4, on 2L O2, NC, VSS.  is at bedside.  Pt denies having any pain at the present time. She is able to ambulate by self with steady gait, no assistance. Hourly rounding is in place. POC discussed with pt; all questions answered at this time.

## 2021-06-14 NOTE — PROGRESS NOTES
Assessment/description of ears? Pink, blanching, intact  Which preventative measures are in place for the ears?  Grey foam on O2 tubing    Assessment/description of elbows? Pink, blanching, intact  Which preventative measures are in place for the elbows?  Pt ambulates, repositions frequently    Assessment/description of sacrum? LUCIA-pt didn't want to roll to one side  Which preventative measures are in place for the sacrum?  Pt ambulates, repositions frequently    Assessment/description of heels? Dry, pink, intact  Which preventative measures are in place for the heels?  Pt ambulates, repositions frequently    Which devices are in place? PICC, NC  Description of skin under devices:Pink, intact  Which preventative measures are in place under devices?    Other: R great toe, L third toe have pressure injury (open to air)

## 2021-06-14 NOTE — PROGRESS NOTES
Infectious Disease Daily Progress Note    Date of Service  6/14/2021    Chief Complaint  Cefazolin 5/18-19, 5/26-present Target 6/14/21  Total abx therapy D#61     Thoracoscopy & Decortication - 4/28  Decortication 5/17     PRIOR Rx:   Zosyn 4/22-4/23  Previous: Unasyn, Zosyn, Vanco, Daptomycin  Ceftaroline: start 4/13-4/15  Nafcillin 2g Q4 hrs 4/15-4/23      Meropenem 5/19-5/26   Cefepime 4/23 5/18 4/14: Unable to give name of previous NSG or neurologist. Seems confused, but able to say some sentences fluently.   4/15: Line cultures now growing MSSA. As well as from the blood. Repeat blood culture 4/13+ in both sets. Patient has worsening confusion. CSF fluid analyses suggest pleocytosis. Cultures are no growth from the CSF. Chest CT was ordered this morning indicating a loculated right pleural effusion as well as bilateral septic emboli. Dr. Mack spoke with Dr. Oh yesterday and no surgical intervention unless clinical deterioration.  4/16: Increased respiratory distress.  Tachypneic.  CT with loculated collection.  Dr Resendiz not available.  No thoracic surgeon available.  Plans to have pulmonary place CT.  Transferring to ICU.  4/17: Transferred to [facility] last night. Hgb dropped to 6.4 requiring PRBC transfusion. Requiring 2 pressors. Fio2 50%. Febrile 38.8. Pending OR decortication of empyema and hemothorax. Chest tube placed at [facility] shows 8,371 WBC, ,000, 74% N  4/18: Chest tube was upsized by surgery yesterday, no decortication. WBC 22k. Fio2 40%. Vasopressin. Levophed down to 2mcg. Afebrile 24 hrs. Last fever 38.6 on 4/16.   4/19: Still on vent. Levo 3 mcg, vasopressin. Afebrile 72 hours. WBC 21k. BC 4/17 NGTD x 48 hours. Unable to do MRI brain given neurostimulator per RN.   4/20: Remaining afebrile. Hb 6.2 and undergoing transfusion today.WBC 24,100, 5% bands.1700 ml CT output. Copious bloody ET secretions. No pressors. Receiving dornase and TPA per CT. Right pleural effusion not large per  CXR today.   4/21: WBC 31k. Bronchoscopy yesterday showing blood. Cultures sent. TPA on hold per RN due to arvind blood from chest tube requiring PRBC transfusion for hgb 6. Pending chest CT today. Per , spinal stimulator is non-MRI compatible. Levophed 10mcg. 30% Fio2. Afebrile.   4/22: Fever 101.3 this am. WBC 20,300 down from 32,200 yesterday. BAL growing Klebsiella pneumoniae but susceptibility panel not set up until today. CTA of brain shows no lesion. CT of chest shows enlarging cavitary lung lesions bilat and large loculated new left pleural effusion and large hydropneumothorax on right. TPA & dornase infusions of right chest ended 4/20 after considerable bleeding requiring transfusion. Hb now down again to 6.8.  4/23: WBC 23k. Fio2 40%. Tmax 38.1. US of stiumlator shows small fluid collection but no drainable abscess. Pending MICAH today. Pending L chest tube placement  4/24: Continue fever 100.8-101.8. WBC 16,600. MICAH shows large vegetations on both tricuspid valve and mitral valve with mild TR & MR.  No aortic root abscess. Left chest tube placed yesterday yielding 8 ml of old bloody fluid. Bronch today revealed copious thick maroon secretions in distal trachea and both mainstem bronchi. On the right these were obstructive. Remaining off pressors. Last positive blood cultures (MSSA) were 4/16, negative 4/17.  4/25: Fever 100.6 this AM. wBC 13,300. Hb 6.6. CT: right hydropneumothorax increasing, right bronchopleural fistula, mediastinal shift ot the left , multiple cavitary pulmonary nodules, 3.1 cm left basilar mass, splenomegaly. CT of head shows dural thickening over the clivus. Lac+ GNR >100,000 col/ml growing from BAL of 4/24, presumed Klebsiella. Left peural fluid culture negative from 4/23.  4/26: Fever 100.4 last night. WBC 13,500. Hb 7.4. Plts 502,000. ESR >120. UA fairly clear except 30 mg% protein, 2-5 wbc and rare rbc. CXR unchanged except more prominent pulmonary edema. GNR in BAL may be a  different species of Klebsiella, and resistant to ampicillin & tmp-sulfa; confirmation pending.  4/28: Fever 100.8 last night. WBC 27,700. Hb 6.6. Plts 482,000. Creat 0.19. Kleb pneum in BAL on 4/24 is resistant to ampicillin & tmp-sulfa but otherwise sensitive. O2 sat 96% on FIO2 30% now, PEEP 8. Has been re-evaluated by thoracic surgeon, Dr. Ganser, who believes she will not wean without decortication on the right. Surgery anticipated today.  4/29: S/P right thoracoscopy with decortication with cultures NG at 24 hrs.  4/30: Had a bronch due to hypoxia and hypotension. CXR with worsening right hemithorax pulmonary opacities. Bloody mucous plugs removed. Pressors started. Febrile this am. Tachy in 130s. Pressors just removed. Tolerating vent, but not a SBT candidate at the moment.  5/1: Small air leak CT-2 every ~ 2/3 breathes. The K. pneumoniae from her thoracoscopy is sensitive to her cefepime so no antibiotic change needed.   5/2: No air leak today she tolerated 2  hrs of spontaneous breathing with support today.      5/3: Second day with SBT and doing well now for 2 hrs. Slight bump in temperature and      WBC's but no obvious clinical infectious changed so watch closely.      5/4: Fever still from time to time. WBCs trending up. Chest tubes in place. Trach.       5/5: She clearly is better today she was sitting up in bed with open eyes and follows commands. She still has low grade fever but her WBC's are dropping. Cefepime dose increased yesterday for increased CNS coverage if necessary. She will probably need further thoracic surgery as mentioned by Dr. Ganser. The issue of what to do with her stimulator is still up in the air for now as it probably will need to be removed but she is nort a candidate for more major surgery at this time.       5/7: She continues to improve and under go longer SBT trials. She just had a new PICC placed in her right arm and plan is to D/C central line.        5/8: PICC placed. No  fevers. Comfortable. Cortrak placed. Failing SBTs.  5/9: No acute issues. No fevers. Comfortable. Family at bedside.   5/10: No acute issues, fevers or WBC bumps. No changes in condition. She is to get her CT scan today and be reviewed by surgery  5/11:  at bedside.  Has been referred to LANE.  Repeat CT scan completed.  Results noted.  ? If Dr Ganser has seen.  Still with an empyema:  ? If candidate for further surgery.  5/12: Pending next thoracic surgery. No fever. Anxious.  5/13: ? Surgery date.  No one seems to know.  Had speaking valve in and having swallow eval with speech therapy  5/14: No acute issues. No fevers. Surgery Monday.  5/15: One CT accidentally pulled out yesterday.  Has been on T piece and tolerating. Plans for surgery Monday afternoon.  5/16: On schedule for surgery 5/17: 4:30 pm.  Moved to room T614.  No new issues.  5/17: Was sitting up in the bedside chair since change of shift.  Now walking in hallways with RN and CNA.  5/18: Decortication yesterday with 2 chest tube placement. WBC improving 21->14k  5/19: Hypotension and tachycardia.  Decreased H/H.  Placed back on vent to rest.  Dr Lozano in process of placing new line.  Antibiotics were deesculated yesterday to Cefazolin.  No cx were taken at surgery.  5/20: Pt was febrile yest afternoon 38.2, but now afebrile overnight after meropenem. WBC improved 11k->8k. Fio2 30%. Phenylephrine now off this morning. CXR shows new R consolidations.   5/21: Off pressors.  On vent: FiO2 30%, PEEP 8, PS 5 Received pRBCs yesterday.  5/22: Remains afebrile, off pressors. FiO2 30%.   5/23: Afebrile. FiO2 30%. Bronchoscopy yesterday normal.   5/24: No new cultures obtained at last bronchoscopy.  On  T piece this am: FiO2 30%.  5/25: Remaining afebrile. WBC 8600.   5/26: Chest tube removed today. Possible transfer to [facility].  5/27: O2 sat usuallyl 95% but intermittent desat to 87-89%.  Remaining afebrile.WBC 6100. Alk phos 641 but normal transaminases.    5/28: Pending [facility] transfer. NO new issues overnight  5/29: Awaiting bed at [facility]. Remaining afebrile. WBC 5800, creat 0.19.  5/31. Remaining afebrile. WBC 6000. . Remains weak and frail. Alk Phos 694 with normal transaminases. Slightly improved bilateral patchy infiltrates.   6/1: She is anxious to move on but still waiting for insurance approval to [facility].  6/2: Still awaiting bed at [facility]. Remaining afebrile. WBC 8300. Alk Phos 705 but transaminases normal and bilirubine 0.4.  6/4: Awaiting insurance auth before bed can be assigned at [facility]. Remaining afebrile. Extensive folliculitis over back and buttocks.  WBC 8300 on 6/2. Last  on 5/31.  6/5: Her ESR is down to 53 from 102 a good sign hopefully. She continues to work on her strength by walking.  6/6: Speech evaluation to check her swallow as she is stronger and can't stand pureed.  6/7: [facility] was denied by insurance. Could she go to full rehab now? Swallow eval pending.clotrimazole for vaginal drainage and itch.  6/8: Continued to be declined by insurances. No issues clinically. Physical Med declined for rehab. No fevers.  6/9: OT will recommend home PT/OT for her once he finishes her antibiotics.  6/10: Per pt and CM and OT, patient does not need SNF anymore may go straight home.   6/11: Her repeat chest CT shows an 8X6 fluid collection we will see if IR can drain the fluid before she is D/C'd.  6/12: Pulm recommends CT surgery evaluation. This is pending.   6/13: CT surgery recommends IR drain placement. Pending.       Review of Systems  Review of Systems   All other systems reviewed and are negative.       Physical Exam  Temp:  [36.1 °C (97 °F)] 36.1 °C (97 °F)  Pulse:  [81-95] 88  Resp:  [16-18] 16  BP: ()/(51-67) 102/57  SpO2:  [92 %-96 %] 96 %    Physical Exam  Constitutional:       Appearance: Normal appearance.   HENT:      Head: Normocephalic.   Cardiovascular:      Rate and Rhythm: Normal rate and regular rhythm.      Heart sounds: No  murmur heard.     Pulmonary:      Effort: Pulmonary effort is normal.      Comments: Diminished R base  Abdominal:      General: Abdomen is flat. Bowel sounds are normal. There is no distension.      Tenderness: There is no abdominal tenderness.   Skin:     General: Skin is warm.      Coloration: Skin is not jaundiced.   Neurological:      Mental Status: She is alert.   Fluids    Intake/Output Summary (Last 24 hours) at 6/14/2021 1016  Last data filed at 6/13/2021 1935  Gross per 24 hour   Intake 240 ml   Output --   Net 240 ml       Laboratory  Recent Labs     06/14/21  0055   WBC 4.5*   RBC 4.20   HEMOGLOBIN 11.8*   HEMATOCRIT 37.6   MCV 89.5   MCH 28.1   MCHC 31.4*   RDW 48.2   PLATELETCT 359   MPV 9.5     Recent Labs     06/14/21  0055   SODIUM 127*   POTASSIUM 4.1   CHLORIDE 93*   CO2 26   GLUCOSE 76   BUN 7*   CREATININE 0.27*   CALCIUM 8.9                   -Severe sepsis  -Catheter related bloodstream infection due to infected port status post removal 4/12-staph aureus  -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   - Acute left empyema s/p chest tube placement 4/23, decortication 5/17, chest tube removal 5/26 - Klebsiella  -Multiple acute septic pulmonary emboli bilaterally  -Acute bilateral pneumonia due to above  --Pulmonary edema  -Pseudotumor cerebri with underlying  shunt: possible arachnoiditis  -Chronic migraine headaches  -History of autoimmune vasculitis  -Elevated alkaline phosphatase & bilirubin  -Acute encephalopathy due to sepsis  - Anemia due to above  - Acute fever on 5/19 likely from secondary VAP  - Secondary VAP R sided pneumonia  - decubitus ulcer sacral.   - R lung abscess 8x6 cm     Plan   - CT surgery recommends IR drain placement. Pending at this time  - will need another 4 week course of abx from drain placement   - Cefazolin order, fell off, will re-order  - new 4  week target date 7/12/21  - Will need suppression after therapy for her HW to include spinal stimulator,  shunt. Given MSSA - Keflex appropriate    Dispo: Pt has medicare/[employer]. Does not have transportation but can make arrangements to followup weekly in [facility]. Will arrange [facility] once patient is ready. Will do ertapenem 1gm Q 24 hours. Left message with Hali Stoll [facility] liason.      45 min spent with 50% face to face care. Case disussed with patient, RN, case management, and Dr Chaudhry

## 2021-06-15 ENCOUNTER — APPOINTMENT (OUTPATIENT)
Dept: RADIOLOGY | Facility: MEDICAL CENTER | Age: 51
DRG: 003 | End: 2021-06-15
Attending: RADIOLOGY
Payer: MEDICARE

## 2021-06-15 ENCOUNTER — APPOINTMENT (OUTPATIENT)
Dept: RADIOLOGY | Facility: MEDICAL CENTER | Age: 51
DRG: 003 | End: 2021-06-15
Attending: STUDENT IN AN ORGANIZED HEALTH CARE EDUCATION/TRAINING PROGRAM
Payer: MEDICARE

## 2021-06-15 LAB
ANION GAP SERPL CALC-SCNC: 7 MMOL/L (ref 7–16)
APPEARANCE FLD: NORMAL
BASOPHILS # BLD AUTO: 0.7 % (ref 0–1.8)
BASOPHILS # BLD: 0.03 K/UL (ref 0–0.12)
BASOPHILS NFR FLD: 0 %
BODY FLD TYPE: NORMAL
BUN SERPL-MCNC: 5 MG/DL (ref 8–22)
CALCIUM SERPL-MCNC: 8.8 MG/DL (ref 8.5–10.5)
CHLORIDE SERPL-SCNC: 97 MMOL/L (ref 96–112)
CO2 SERPL-SCNC: 28 MMOL/L (ref 20–33)
COLOR FLD: NORMAL
CREAT SERPL-MCNC: 0.23 MG/DL (ref 0.5–1.4)
CSF COMMENTS 1658: NORMAL
EOSINOPHIL # BLD AUTO: 0.01 K/UL (ref 0–0.51)
EOSINOPHIL NFR BLD: 0.2 % (ref 0–6.9)
EOSINOPHIL NFR FLD: 0 %
ERYTHROCYTE [DISTWIDTH] IN BLOOD BY AUTOMATED COUNT: 48.4 FL (ref 35.9–50)
GLUCOSE SERPL-MCNC: 76 MG/DL (ref 65–99)
GRAM STN SPEC: NORMAL
HCT VFR BLD AUTO: 32.3 % (ref 37–47)
HGB BLD-MCNC: 10.1 G/DL (ref 12–16)
HISTIOCYTES NFR FLD: 0 %
IMM GRANULOCYTES # BLD AUTO: 0.03 K/UL (ref 0–0.11)
IMM GRANULOCYTES NFR BLD AUTO: 0.7 % (ref 0–0.9)
LYMPHOCYTES # BLD AUTO: 1.34 K/UL (ref 1–4.8)
LYMPHOCYTES NFR BLD: 31.8 % (ref 22–41)
LYMPHOCYTES NFR FLD: 0 %
MCH RBC QN AUTO: 28 PG (ref 27–33)
MCHC RBC AUTO-ENTMCNC: 31.3 G/DL (ref 33.6–35)
MCV RBC AUTO: 89.5 FL (ref 81.4–97.8)
MESOTHL CELL NFR FLD: 0 %
MONOCYTES # BLD AUTO: 0.4 K/UL (ref 0–0.85)
MONOCYTES NFR BLD AUTO: 9.5 % (ref 0–13.4)
MONONUC CELLS NFR FLD: 0 %
NEUTROPHILS # BLD AUTO: 2.4 K/UL (ref 2–7.15)
NEUTROPHILS NFR BLD: 57.1 % (ref 44–72)
NEUTROPHILS NFR FLD: 100 %
NRBC # BLD AUTO: 0 K/UL
NRBC BLD-RTO: 0 /100 WBC
PLATELET # BLD AUTO: 335 K/UL (ref 164–446)
PMV BLD AUTO: 9.7 FL (ref 9–12.9)
POTASSIUM SERPL-SCNC: 3.7 MMOL/L (ref 3.6–5.5)
RBC # BLD AUTO: 3.61 M/UL (ref 4.2–5.4)
RBC # FLD: NORMAL CELLS/UL
SIGNIFICANT IND 70042: NORMAL
SITE SITE: NORMAL
SODIUM SERPL-SCNC: 132 MMOL/L (ref 135–145)
SOURCE SOURCE: NORMAL
WBC # BLD AUTO: 4.2 K/UL (ref 4.8–10.8)
WBC # FLD: 6897 CELLS/UL
WBC OTHER NFR FLD: 0 %

## 2021-06-15 PROCEDURE — 87077 CULTURE AEROBIC IDENTIFY: CPT

## 2021-06-15 PROCEDURE — 87205 SMEAR GRAM STAIN: CPT

## 2021-06-15 PROCEDURE — 87102 FUNGUS ISOLATION CULTURE: CPT

## 2021-06-15 PROCEDURE — 700111 HCHG RX REV CODE 636 W/ 250 OVERRIDE (IP): Performed by: INTERNAL MEDICINE

## 2021-06-15 PROCEDURE — 700102 HCHG RX REV CODE 250 W/ 637 OVERRIDE(OP): Performed by: HOSPITALIST

## 2021-06-15 PROCEDURE — 80048 BASIC METABOLIC PNL TOTAL CA: CPT

## 2021-06-15 PROCEDURE — 87070 CULTURE OTHR SPECIMN AEROBIC: CPT

## 2021-06-15 PROCEDURE — 99232 SBSQ HOSP IP/OBS MODERATE 35: CPT | Performed by: STUDENT IN AN ORGANIZED HEALTH CARE EDUCATION/TRAINING PROGRAM

## 2021-06-15 PROCEDURE — A9270 NON-COVERED ITEM OR SERVICE: HCPCS | Performed by: HOSPITALIST

## 2021-06-15 PROCEDURE — 700111 HCHG RX REV CODE 636 W/ 250 OVERRIDE (IP)

## 2021-06-15 PROCEDURE — 87186 SC STD MICRODIL/AGAR DIL: CPT

## 2021-06-15 PROCEDURE — 770006 HCHG ROOM/CARE - MED/SURG/GYN SEMI*

## 2021-06-15 PROCEDURE — 99233 SBSQ HOSP IP/OBS HIGH 50: CPT | Mod: GC | Performed by: INTERNAL MEDICINE

## 2021-06-15 PROCEDURE — 99152 MOD SED SAME PHYS/QHP 5/>YRS: CPT

## 2021-06-15 PROCEDURE — 700111 HCHG RX REV CODE 636 W/ 250 OVERRIDE (IP): Performed by: STUDENT IN AN ORGANIZED HEALTH CARE EDUCATION/TRAINING PROGRAM

## 2021-06-15 PROCEDURE — 89051 BODY FLUID CELL COUNT: CPT

## 2021-06-15 PROCEDURE — 0W9930Z DRAINAGE OF RIGHT PLEURAL CAVITY WITH DRAINAGE DEVICE, PERCUTANEOUS APPROACH: ICD-10-PCS | Performed by: RADIOLOGY

## 2021-06-15 PROCEDURE — 71045 X-RAY EXAM CHEST 1 VIEW: CPT

## 2021-06-15 PROCEDURE — 85025 COMPLETE CBC W/AUTO DIFF WBC: CPT

## 2021-06-15 PROCEDURE — 700111 HCHG RX REV CODE 636 W/ 250 OVERRIDE (IP): Mod: JG | Performed by: STUDENT IN AN ORGANIZED HEALTH CARE EDUCATION/TRAINING PROGRAM

## 2021-06-15 RX ORDER — MIDAZOLAM HYDROCHLORIDE 1 MG/ML
INJECTION INTRAMUSCULAR; INTRAVENOUS
Status: COMPLETED
Start: 2021-06-15 | End: 2021-06-15

## 2021-06-15 RX ORDER — ONDANSETRON 2 MG/ML
4 INJECTION INTRAMUSCULAR; INTRAVENOUS PRN
Status: ACTIVE | OUTPATIENT
Start: 2021-06-15 | End: 2021-06-15

## 2021-06-15 RX ORDER — MIDAZOLAM HYDROCHLORIDE 1 MG/ML
.5-2 INJECTION INTRAMUSCULAR; INTRAVENOUS PRN
Status: ACTIVE | OUTPATIENT
Start: 2021-06-15 | End: 2021-06-15

## 2021-06-15 RX ORDER — SODIUM CHLORIDE 9 MG/ML
500 INJECTION, SOLUTION INTRAVENOUS
Status: ACTIVE | OUTPATIENT
Start: 2021-06-15 | End: 2021-06-15

## 2021-06-15 RX ORDER — MORPHINE SULFATE 4 MG/ML
2 INJECTION, SOLUTION INTRAMUSCULAR; INTRAVENOUS
Status: DISCONTINUED | OUTPATIENT
Start: 2021-06-15 | End: 2021-06-16

## 2021-06-15 RX ADMIN — ALTEPLASE 2 MG: 2.2 INJECTION, POWDER, LYOPHILIZED, FOR SOLUTION INTRAVENOUS at 23:45

## 2021-06-15 RX ADMIN — DIPHENHYDRAMINE HYDROCHLORIDE 50 MG: 50 INJECTION INTRAMUSCULAR; INTRAVENOUS at 14:25

## 2021-06-15 RX ADMIN — DIPHENHYDRAMINE HYDROCHLORIDE 50 MG: 50 INJECTION INTRAMUSCULAR; INTRAVENOUS at 21:41

## 2021-06-15 RX ADMIN — DIVALPROEX SODIUM 500 MG: 125 CAPSULE ORAL at 05:40

## 2021-06-15 RX ADMIN — DULOXETINE HYDROCHLORIDE 60 MG: 60 CAPSULE, DELAYED RELEASE ORAL at 17:30

## 2021-06-15 RX ADMIN — DIPHENHYDRAMINE HYDROCHLORIDE 50 MG: 50 INJECTION INTRAMUSCULAR; INTRAVENOUS at 02:53

## 2021-06-15 RX ADMIN — ACETAMINOPHEN 500 MG: 325 TABLET ORAL at 21:41

## 2021-06-15 RX ADMIN — DOCUSATE SODIUM 50 MG AND SENNOSIDES 8.6 MG 2 TABLET: 8.6; 5 TABLET, FILM COATED ORAL at 05:41

## 2021-06-15 RX ADMIN — GABAPENTIN 300 MG: 300 CAPSULE ORAL at 21:41

## 2021-06-15 RX ADMIN — CEFAZOLIN SODIUM 2 G: 2 INJECTION, SOLUTION INTRAVENOUS at 06:00

## 2021-06-15 RX ADMIN — CEFAZOLIN SODIUM 2 G: 2 INJECTION, SOLUTION INTRAVENOUS at 21:47

## 2021-06-15 RX ADMIN — METHADONE HYDROCHLORIDE 30 MG: 10 TABLET ORAL at 05:41

## 2021-06-15 RX ADMIN — MIDAZOLAM HYDROCHLORIDE 1 MG: 1 INJECTION INTRAMUSCULAR; INTRAVENOUS at 09:47

## 2021-06-15 RX ADMIN — DULOXETINE HYDROCHLORIDE 60 MG: 60 CAPSULE, DELAYED RELEASE ORAL at 05:40

## 2021-06-15 RX ADMIN — CEFAZOLIN SODIUM 2 G: 2 INJECTION, SOLUTION INTRAVENOUS at 14:25

## 2021-06-15 RX ADMIN — MIDAZOLAM HYDROCHLORIDE 1 MG: 1 INJECTION, SOLUTION INTRAMUSCULAR; INTRAVENOUS at 09:47

## 2021-06-15 RX ADMIN — DIVALPROEX SODIUM 500 MG: 125 CAPSULE ORAL at 14:25

## 2021-06-15 RX ADMIN — DOCUSATE SODIUM 50 MG AND SENNOSIDES 8.6 MG 2 TABLET: 8.6; 5 TABLET, FILM COATED ORAL at 17:30

## 2021-06-15 RX ADMIN — FENTANYL CITRATE 50 MCG: 50 INJECTION, SOLUTION INTRAMUSCULAR; INTRAVENOUS at 09:47

## 2021-06-15 RX ADMIN — OXYCODONE 5 MG: 5 TABLET ORAL at 21:53

## 2021-06-15 RX ADMIN — GABAPENTIN 300 MG: 300 CAPSULE ORAL at 14:25

## 2021-06-15 RX ADMIN — PAROXETINE HYDROCHLORIDE 10 MG: 10 TABLET, FILM COATED ORAL at 05:40

## 2021-06-15 RX ADMIN — RISPERIDONE 2 MG: 2 TABLET ORAL at 17:29

## 2021-06-15 RX ADMIN — GABAPENTIN 300 MG: 300 CAPSULE ORAL at 05:41

## 2021-06-15 RX ADMIN — RISPERIDONE 2 MG: 2 TABLET ORAL at 05:41

## 2021-06-15 RX ADMIN — ACETAMINOPHEN 500 MG: 325 TABLET ORAL at 14:25

## 2021-06-15 RX ADMIN — DIVALPROEX SODIUM 500 MG: 125 CAPSULE ORAL at 21:41

## 2021-06-15 RX ADMIN — OXYCODONE 5 MG: 5 TABLET ORAL at 14:25

## 2021-06-15 RX ADMIN — OXYCODONE 5 MG: 5 TABLET ORAL at 02:53

## 2021-06-15 ASSESSMENT — ENCOUNTER SYMPTOMS
LOSS OF CONSCIOUSNESS: 0
BLURRED VISION: 0
MEMORY LOSS: 0
ABDOMINAL PAIN: 0
FALLS: 0
NERVOUS/ANXIOUS: 0
HEARTBURN: 0
COUGH: 0
DOUBLE VISION: 0
SHORTNESS OF BREATH: 0
FEVER: 0
BACK PAIN: 0
HEADACHES: 0
DIARRHEA: 0
VOMITING: 0
TINGLING: 0
CHILLS: 0
SPUTUM PRODUCTION: 1
DIZZINESS: 0
SORE THROAT: 0
NAUSEA: 0
COUGH: 1
MYALGIAS: 0
PALPITATIONS: 0
HEMOPTYSIS: 0
WHEEZING: 0

## 2021-06-15 ASSESSMENT — PAIN DESCRIPTION - PAIN TYPE
TYPE: ACUTE PAIN

## 2021-06-15 NOTE — PROGRESS NOTES
Pulmonary Progress Note    Date of admission  4/16/2021    Chief Complaint  50 y.o. female admitted 4/16/2021 with right-sided empyema secondary to septic emboli in the setting of MSSA bacteremia.    Hospital Course  50-year-old female with past medical history of pseudotumor cerebri status post  shunt, chronic pain syndrome with history of neurostimulator, right anterior chest port for home IV medication administration presented to [facility] with recurrent port infection initially treated with vancomycin and Zosyn.'s port was removed by surgery on 4/12. Patient was later noted to have endocarditis of the tricuspid valve, lumbar puncture was ultimately performed which showed pleocytosis level otherwise from a unremarkable. Patient was found to have septic emboli on CT with right greater than left effusion which was loculated, status post several chest tubes and right thoracoscopy, decortication x2. Patient had a repeat CT scan done on 6/10 which showed right 8x6cm loculated pleural fluid collection. Chest tube replaced on 6/15 by IR after patient was deemed not a surgical candidate.    Pulmonology has been consulted for management of chest tube.    Interval Problem Update  6/15: No acute events overnight, chest tube placed 6/15, suction and cues to increase to -40, draining minimal thick bloody fluid. Patient remains afebrile, no leukocytosis. No complaints. Antibiotics as per ID.    Review of Systems  Review of Systems   Constitutional: Negative for chills and fever.   HENT: Negative for ear pain, hearing loss and tinnitus.    Eyes: Negative for blurred vision and double vision.   Respiratory: Positive for cough and sputum production. Negative for hemoptysis and wheezing.    Cardiovascular: Negative for chest pain and palpitations.   Gastrointestinal: Negative for heartburn, nausea and vomiting.   Genitourinary: Negative for dysuria and urgency.   Musculoskeletal: Negative for falls and  myalgias.   Skin: Negative for itching and rash.   Neurological: Negative for dizziness, tingling and headaches.   Psychiatric/Behavioral: Negative for memory loss. The patient is not nervous/anxious.           Vital Signs for last 24 hours   Temp:  [35.9 °C (96.6 °F)-37 °C (98.6 °F)] 35.9 °C (96.6 °F)  Pulse:  [84-95] 86  Resp:  [14-18] 16  BP: ()/(50-65) 99/57  SpO2:  [94 %-98 %] 97 %    Physical Exam   Physical Exam  Constitutional:       Appearance: Normal appearance.   HENT:      Head: Normocephalic and atraumatic.   Eyes:      Extraocular Movements: Extraocular movements intact.      Conjunctiva/sclera: Conjunctivae normal.      Pupils: Pupils are equal, round, and reactive to light.   Cardiovascular:      Rate and Rhythm: Normal rate and regular rhythm.      Heart sounds: Normal heart sounds.   Pulmonary:      Breath sounds: No wheezing.      Comments: Diminished breath sounds on right.  Chest tube placed on anterior side of right chest  Abdominal:      General: There is no distension.      Palpations: Abdomen is soft.      Tenderness: There is no abdominal tenderness.   Musculoskeletal:      Right lower leg: No edema.      Left lower leg: No edema.   Skin:     General: Skin is warm.      Capillary Refill: Capillary refill takes less than 2 seconds.      Coloration: Skin is not jaundiced.   Neurological:      General: No focal deficit present.      Mental Status: She is alert and oriented to person, place, and time.   Psychiatric:         Mood and Affect: Mood normal.         Judgment: Judgment normal.           Medications  Current Facility-Administered Medications   Medication Dose Route Frequency Provider Last Rate Last Admin   • morphine (pf) 4 mg/mL injection 2 mg  2 mg Intravenous Q2HRS PRN Raphael Chaudhry M.D.       • diphenhydrAMINE (BENADRYL) tablet/capsule 25 mg  25 mg Oral Q6HRS PRN Raphael Chaudhry M.D.       • ceFAZolin in dextrose (ANCEF) IVPB premix 2 g  2 g Intravenous Q8HRS Marlon Cox D.O.    Stopped at 06/15/21 1455   • diphenhydrAMINE (BENADRYL) injection 50 mg  50 mg Intravenous Q6HRS PRN Pascual Augustine M.D.   50 mg at 06/15/21 1425   • acetaminophen (TYLENOL) tablet 500 mg  500 mg Oral TID Bill Marcum M.D.   500 mg at 06/15/21 1425   • divalproex (DEPAKOTE SPRINKLE) capsule 500 mg  500 mg Oral Q8HRS Bill Marcum M.D.   500 mg at 06/15/21 1425   • DULoxetine (CYMBALTA) capsule 60 mg  60 mg Oral BID Bill Marcum M.D.   60 mg at 06/15/21 0540   • gabapentin (NEURONTIN) capsule 300 mg  300 mg Oral Q8HRS Bill Marcum M.D.   300 mg at 06/15/21 1425   • methadone (DOLOPHINE) tablet 30 mg  30 mg Oral DAILY Bill Marcum M.D.   30 mg at 06/15/21 0541   • PARoxetine (PAXIL) tablet 10 mg  10 mg Oral QAM Bill Marcum M.D.   10 mg at 06/15/21 0540   • risperiDONE (RISPERDAL) tablet 2 mg  2 mg Oral BID Bill Marcum M.D.   2 mg at 06/15/21 0541   • senna-docusate (PERICOLACE or SENOKOT S) 8.6-50 MG per tablet 2 tablet  2 tablet Oral BID Bill Marcum M.D.   2 tablet at 06/15/21 0541    And   • polyethylene glycol/lytes (MIRALAX) PACKET 1 Packet  1 Packet Oral QDAY PRN Bill Marcum M.D.        And   • magnesium hydroxide (MILK OF MAGNESIA) suspension 30 mL  30 mL Oral QDAY PRN Bill Marcum M.D.        And   • bisacodyl (DULCOLAX) suppository 10 mg  10 mg Rectal QDAY PRN Bill Marcum M.D.       • acetaminophen (Tylenol) tablet 650 mg  650 mg Oral Q4HRS PRN Bill Marcum M.D.   650 mg at 05/30/21 0457   • diazePAM (VALIUM) tablet 5 mg  5 mg Oral QHS PRN Bill Marcum M.D.   5 mg at 06/14/21 2122   • oxyCODONE immediate-release (ROXICODONE) tablet 5 mg  5 mg Oral Q4HRS PRN Bill Marcum M.D.   5 mg at 06/15/21 1425   • enoxaparin (LOVENOX) inj 40 mg  40 mg Subcutaneous DAILY Juventino Lozano M.D.   40 mg at 06/14/21 0520   • Respiratory Therapy Consult   Nebulization Continuous RT Juventino DELAROSA  ALFRED Lozano       • ondansetron (ZOFRAN) syringe/vial injection 4 mg  4 mg Intravenous Q4HRS PRN Juventino Lozano M.D.   4 mg at 06/13/21 1349   • labetalol (NORMODYNE/TRANDATE) injection 10 mg  10 mg Intravenous Q4HRS PRN Juventino Lozano M.D.   10 mg at 05/04/21 0226   • ipratropium-albuterol (DUONEB) nebulizer solution  3 mL Nebulization Q2HRS PRN (RT) Juventino Lozano M.D.           Fluids    Intake/Output Summary (Last 24 hours) at 6/15/2021 1555  Last data filed at 6/14/2021 2100  Gross per 24 hour   Intake 240 ml   Output --   Net 240 ml       Laboratory          Recent Labs     06/14/21  0055 06/15/21  0315   SODIUM 127* 132*   POTASSIUM 4.1 3.7   CHLORIDE 93* 97   CO2 26 28   BUN 7* 5*   CREATININE 0.27* 0.23*   CALCIUM 8.9 8.8     Recent Labs     06/14/21  0055 06/15/21  0315   ALTSGPT <5  --    ASTSGOT 13  --    ALKPHOSPHAT 293*  --    TBILIRUBIN 0.3  --    GLUCOSE 76 76     Recent Labs     06/14/21  0055 06/15/21  0315   WBC 4.5* 4.2*   NEUTSPOLYS 62.30 57.10   LYMPHOCYTES 29.80 31.80   MONOCYTES 7.10 9.50   EOSINOPHILS 0.20 0.20   BASOPHILS 0.40 0.70   ASTSGOT 13  --    ALTSGPT <5  --    ALKPHOSPHAT 293*  --    TBILIRUBIN 0.3  --      Recent Labs     06/14/21  0055 06/15/21  0315   RBC 4.20 3.61*   HEMOGLOBIN 11.8* 10.1*   HEMATOCRIT 37.6 32.3*   PLATELETCT 359 335     Imaging  CT:    Reviewed  Loculated right anterior and anterior/lateral rim-enhancing pleural fluid . Extent is from just below the lung apex to the diaphragm and the largest component measures 8.0 x 6.0 cm transversely on  6/10.      Assessment/Plan  Empyema of right pleural space (HCC)- (present on admission)  Assessment & Plan  complicated h/o R>L empyema s/p multiple chest tubes and decortication over the last 2 months, in the setting of septic emboli from MSSA bacteremia.  Repeat CT on 6/10 revealed possible residual empyema 8x6cm right anterior chest. IR guided chest tube placed on 6/15.  -This doesnot appear to be a complicated  para pneumonic effusion as patient has been afebrile, no evidence of leukocytosis and is clinically stable otherwise.    Plan:  -increase suction to negative 40 cm, CXR in am  -will add fluid cell count to the pleural fluid studies, pending gramstain and culture.  - On abx per ID  -No need for lytic therapy given low suspicion for complicated effusion       I have performed a physical exam and reviewed and updated ROS and Plan today (6/15/2021). In review of yesterday's note (6/14/2021), there are no changes except as documented above.     Discussed patient condition and risk of morbidity and/or mortality with Family, RN and Patient    __________  Blas Jordan MD  Pulmonary and Critical Care Medicine  [facility]

## 2021-06-15 NOTE — CARE PLAN
The patient is Stable - Low risk of patient condition declining or worsening    Shift Goals  Clinical Goals: pt will demonstrate understanding of POC for the shift  Patient Goals: Pain   Family Goals: NA    Progress made toward(s) clinical / shift goals:  pt stated understanding of POC, questions answered.    Patient is not progressing towards the following goals:

## 2021-06-15 NOTE — PROGRESS NOTES
Bedside report received. Pt is A&O X4, on 1L O2, NC, VSS. Pt denies having any pain at the present time. Pt will be NPO at midnight in prep for drain placement to chest in AM, She is able to ambulate by self with steady gait, no assistance. Hourly rounding is in place. POC discussed with pt; all questions answered at this time.

## 2021-06-15 NOTE — PROGRESS NOTES
Site Marked and Procedure Confirmed with MD, patient and RN pre procedure. Consent obtained by MD Liwith all questions answered, placed in Patient's chart. Patient assisted to the table in the Supine position with all bony prominences padded and draped in sterile fashion.    Right Pleural abscess drain placement by MD Li assisted by RT Dimitri, access site.  Cultures, Gram stain, C&S, Fungal sent to the lab by Dimitri.   Patient tolerated procedure, hemodynamically stable; pt drowsy post procedure; report given to RN Consuelo Agosto; patient transported to [facility] via IR RN monitored then transferred care to report RN.    [employer]  Flexima APDL  Locking pigtail  12F x 25 CM  REF:[id number]  LOT:[id number]  EXP:04-

## 2021-06-15 NOTE — PROGRESS NOTES
Assessment/description of ears? Pink, blanching, intact  Which preventative measures are in place for the ears?  Grey foam on O2 tubing     Assessment/description of elbows? Pink, blanching, intact  Which preventative measures are in place for the elbows?  Pt ambulates, repositions frequently     Assessment/description of sacrum? Pink/red, excoriated, intact  Which preventative measures are in place for the sacrum?  Pt ambulates, repositions frequently. Barrier cream applied     Assessment/description of heels? Dry, pink, intact  Which preventative measures are in place for the heels?  Pt ambulates, repositions frequently     Which devices are in place? PICC, NC  Description of skin under devices:Pink, intact  Which preventative measures are in place under devices?     Other: R great toe, L third toe have pressure injury (open to air)  Bilateral crease btw upper thigh and pelvis excoriated, red-barrier cream applied.

## 2021-06-15 NOTE — PROGRESS NOTES
Infectious Disease Daily Progress Note    Date of Service  6/15/2021    Chief Complaint  Cefazolin 5/18-19, 5/26-present Target 6/14/21  Total abx therapy D#61     Thoracoscopy & Decortication - 4/28  Decortication 5/17     PRIOR Rx:   Zosyn 4/22-4/23  Previous: Unasyn, Zosyn, Vanco, Daptomycin  Ceftaroline: start 4/13-4/15  Nafcillin 2g Q4 hrs 4/15-4/23      Meropenem 5/19-5/26   Cefepime 4/23 5/18 4/14: Unable to give name of previous NSG or neurologist. Seems confused, but able to say some sentences fluently.   4/15: Line cultures now growing MSSA. As well as from the blood. Repeat blood culture 4/13+ in both sets. Patient has worsening confusion. CSF fluid analyses suggest pleocytosis. Cultures are no growth from the CSF. Chest CT was ordered this morning indicating a loculated right pleural effusion as well as bilateral septic emboli. Dr. Mack spoke with Dr. Oh yesterday and no surgical intervention unless clinical deterioration.  4/16: Increased respiratory distress.  Tachypneic.  CT with loculated collection.  Dr Resendiz not available.  No thoracic surgeon available.  Plans to have pulmonary place CT.  Transferring to ICU.  4/17: Transferred to [facility] last night. Hgb dropped to 6.4 requiring PRBC transfusion. Requiring 2 pressors. Fio2 50%. Febrile 38.8. Pending OR decortication of empyema and hemothorax. Chest tube placed at [facility] shows 8,371 WBC, ,000, 74% N  4/18: Chest tube was upsized by surgery yesterday, no decortication. WBC 22k. Fio2 40%. Vasopressin. Levophed down to 2mcg. Afebrile 24 hrs. Last fever 38.6 on 4/16.   4/19: Still on vent. Levo 3 mcg, vasopressin. Afebrile 72 hours. WBC 21k. BC 4/17 NGTD x 48 hours. Unable to do MRI brain given neurostimulator per RN.   4/20: Remaining afebrile. Hb 6.2 and undergoing transfusion today.WBC 24,100, 5% bands.1700 ml CT output. Copious bloody ET secretions. No pressors. Receiving dornase and TPA per CT. Right pleural effusion not large per  CXR today.   4/21: WBC 31k. Bronchoscopy yesterday showing blood. Cultures sent. TPA on hold per RN due to arvind blood from chest tube requiring PRBC transfusion for hgb 6. Pending chest CT today. Per , spinal stimulator is non-MRI compatible. Levophed 10mcg. 30% Fio2. Afebrile.   4/22: Fever 101.3 this am. WBC 20,300 down from 32,200 yesterday. BAL growing Klebsiella pneumoniae but susceptibility panel not set up until today. CTA of brain shows no lesion. CT of chest shows enlarging cavitary lung lesions bilat and large loculated new left pleural effusion and large hydropneumothorax on right. TPA & dornase infusions of right chest ended 4/20 after considerable bleeding requiring transfusion. Hb now down again to 6.8.  4/23: WBC 23k. Fio2 40%. Tmax 38.1. US of stiumlator shows small fluid collection but no drainable abscess. Pending MICAH today. Pending L chest tube placement  4/24: Continue fever 100.8-101.8. WBC 16,600. MICAH shows large vegetations on both tricuspid valve and mitral valve with mild TR & MR.  No aortic root abscess. Left chest tube placed yesterday yielding 8 ml of old bloody fluid. Bronch today revealed copious thick maroon secretions in distal trachea and both mainstem bronchi. On the right these were obstructive. Remaining off pressors. Last positive blood cultures (MSSA) were 4/16, negative 4/17.  4/25: Fever 100.6 this AM. wBC 13,300. Hb 6.6. CT: right hydropneumothorax increasing, right bronchopleural fistula, mediastinal shift ot the left , multiple cavitary pulmonary nodules, 3.1 cm left basilar mass, splenomegaly. CT of head shows dural thickening over the clivus. Lac+ GNR >100,000 col/ml growing from BAL of 4/24, presumed Klebsiella. Left peural fluid culture negative from 4/23.  4/26: Fever 100.4 last night. WBC 13,500. Hb 7.4. Plts 502,000. ESR >120. UA fairly clear except 30 mg% protein, 2-5 wbc and rare rbc. CXR unchanged except more prominent pulmonary edema. GNR in BAL may be a  different species of Klebsiella, and resistant to ampicillin & tmp-sulfa; confirmation pending.  4/28: Fever 100.8 last night. WBC 27,700. Hb 6.6. Plts 482,000. Creat 0.19. Kleb pneum in BAL on 4/24 is resistant to ampicillin & tmp-sulfa but otherwise sensitive. O2 sat 96% on FIO2 30% now, PEEP 8. Has been re-evaluated by thoracic surgeon, Dr. Ganser, who believes she will not wean without decortication on the right. Surgery anticipated today.  4/29: S/P right thoracoscopy with decortication with cultures NG at 24 hrs.  4/30: Had a bronch due to hypoxia and hypotension. CXR with worsening right hemithorax pulmonary opacities. Bloody mucous plugs removed. Pressors started. Febrile this am. Tachy in 130s. Pressors just removed. Tolerating vent, but not a SBT candidate at the moment.  5/1: Small air leak CT-2 every ~ 2/3 breathes. The K. pneumoniae from her thoracoscopy is sensitive to her cefepime so no antibiotic change needed.   5/2: No air leak today she tolerated 2  hrs of spontaneous breathing with support today.      5/3: Second day with SBT and doing well now for 2 hrs. Slight bump in temperature and      WBC's but no obvious clinical infectious changed so watch closely.      5/4: Fever still from time to time. WBCs trending up. Chest tubes in place. Trach.       5/5: She clearly is better today she was sitting up in bed with open eyes and follows commands. She still has low grade fever but her WBC's are dropping. Cefepime dose increased yesterday for increased CNS coverage if necessary. She will probably need further thoracic surgery as mentioned by Dr. Ganser. The issue of what to do with her stimulator is still up in the air for now as it probably will need to be removed but she is nort a candidate for more major surgery at this time.       5/7: She continues to improve and under go longer SBT trials. She just had a new PICC placed in her right arm and plan is to D/C central line.        5/8: PICC placed. No  fevers. Comfortable. Cortrak placed. Failing SBTs.  5/9: No acute issues. No fevers. Comfortable. Family at bedside.   5/10: No acute issues, fevers or WBC bumps. No changes in condition. She is to get her CT scan today and be reviewed by surgery  5/11:  at bedside.  Has been referred to LANE.  Repeat CT scan completed.  Results noted.  ? If Dr Ganser has seen.  Still with an empyema:  ? If candidate for further surgery.  5/12: Pending next thoracic surgery. No fever. Anxious.  5/13: ? Surgery date.  No one seems to know.  Had speaking valve in and having swallow eval with speech therapy  5/14: No acute issues. No fevers. Surgery Monday.  5/15: One CT accidentally pulled out yesterday.  Has been on T piece and tolerating. Plans for surgery Monday afternoon.  5/16: On schedule for surgery 5/17: 4:30 pm.  Moved to room T614.  No new issues.  5/17: Was sitting up in the bedside chair since change of shift.  Now walking in hallways with RN and CNA.  5/18: Decortication yesterday with 2 chest tube placement. WBC improving 21->14k  5/19: Hypotension and tachycardia.  Decreased H/H.  Placed back on vent to rest.  Dr Lozano in process of placing new line.  Antibiotics were deesculated yesterday to Cefazolin.  No cx were taken at surgery.  5/20: Pt was febrile yest afternoon 38.2, but now afebrile overnight after meropenem. WBC improved 11k->8k. Fio2 30%. Phenylephrine now off this morning. CXR shows new R consolidations.   5/21: Off pressors.  On vent: FiO2 30%, PEEP 8, PS 5 Received pRBCs yesterday.  5/22: Remains afebrile, off pressors. FiO2 30%.   5/23: Afebrile. FiO2 30%. Bronchoscopy yesterday normal.   5/24: No new cultures obtained at last bronchoscopy.  On  T piece this am: FiO2 30%.  5/25: Remaining afebrile. WBC 8600.   5/26: Chest tube removed today. Possible transfer to [facility].  5/27: O2 sat usuallyl 95% but intermittent desat to 87-89%.  Remaining afebrile.WBC 6100. Alk phos 641 but normal transaminases.    5/28: Pending [facility] transfer. NO new issues overnight  5/29: Awaiting bed at [facility]. Remaining afebrile. WBC 5800, creat 0.19.  5/31. Remaining afebrile. WBC 6000. . Remains weak and frail. Alk Phos 694 with normal transaminases. Slightly improved bilateral patchy infiltrates.   6/1: She is anxious to move on but still waiting for insurance approval to [facility].  6/2: Still awaiting bed at [facility]. Remaining afebrile. WBC 8300. Alk Phos 705 but transaminases normal and bilirubine 0.4.  6/4: Awaiting insurance auth before bed can be assigned at [facility]. Remaining afebrile. Extensive folliculitis over back and buttocks.  WBC 8300 on 6/2. Last  on 5/31.  6/5: Her ESR is down to 53 from 102 a good sign hopefully. She continues to work on her strength by walking.  6/6: Speech evaluation to check her swallow as she is stronger and can't stand pureed.  6/7: [facility] was denied by insurance. Could she go to full rehab now? Swallow eval pending.clotrimazole for vaginal drainage and itch.  6/8: Continued to be declined by insurances. No issues clinically. Physical Med declined for rehab. No fevers.  6/9: OT will recommend home PT/OT for her once he finishes her antibiotics.  6/10: Per pt and CM and OT, patient does not need SNF anymore may go straight home.   6/11: Her repeat chest CT shows an 8X6 fluid collection we will see if IR can drain the fluid before she is D/C'd.  6/12: Pulm recommends CT surgery evaluation. This is pending.   6/13: CT surgery recommends IR drain placement. Pending.   6/15: Just back from IR.  CT placed into right chest.  5 cc of thick bloody fluid obtained and sent to lab.   at bedside. States she walked six laps yesterday.    Review of Systems  Review of Systems   All other systems reviewed and are negative.       Physical Exam  Temp:  [35.9 °C (96.7 °F)-37 °C (98.6 °F)] 37 °C (98.6 °F)  Pulse:  [84-95] 90  Resp:  [14-20] 16  BP: ()/(50-79) 85/53  SpO2:  [94 %-96 %] 95 %    Physical  Exam  Constitutional:       Appearance: Normal appearance.   HENT:      Head: Normocephalic.   Cardiovascular:      Rate and Rhythm: Normal rate and regular rhythm.      Heart sounds: No murmur heard.     Pulmonary:      Effort: Pulmonary effort is normal.      Comments: Diminished R base. CT in the right chest: bloody material in the tubing.  Abdominal:      General: Abdomen is flat. Bowel sounds are normal. There is no distension.      Tenderness: There is no abdominal tenderness.   Skin:     General: Skin is warm.      Coloration: Skin is not jaundiced.   Neurological:      Mental Status: She is alert.   Fluids    Intake/Output Summary (Last 24 hours) at 6/15/2021 1037  Last data filed at 6/14/2021 2100  Gross per 24 hour   Intake 600 ml   Output --   Net 600 ml       Laboratory  Recent Labs     06/14/21  0055 06/15/21  0315   WBC 4.5* 4.2*   RBC 4.20 3.61*   HEMOGLOBIN 11.8* 10.1*   HEMATOCRIT 37.6 32.3*   MCV 89.5 89.5   MCH 28.1 28.0   MCHC 31.4* 31.3*   RDW 48.2 48.4   PLATELETCT 359 335   MPV 9.5 9.7     Recent Labs     06/14/21  0055 06/15/21  0315   SODIUM 127* 132*   POTASSIUM 4.1 3.7   CHLORIDE 93* 97   CO2 26 28   GLUCOSE 76 76   BUN 7* 5*   CREATININE 0.27* 0.23*   CALCIUM 8.9 8.8                   -Severe sepsis  -Catheter related bloodstream infection due to infected port status post removal 4/12-staph aureus  -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   - Acute left empyema s/p chest tube placement 4/23, decortication 5/17, chest tube removal 5/26 - Klebsiella  -Multiple acute septic pulmonary emboli bilaterally  -Acute bilateral pneumonia due to above  --Pulmonary edema  -Pseudotumor cerebri with underlying  shunt: possible arachnoiditis  -Chronic migraine headaches  -History of autoimmune vasculitis  -Elevated alkaline phosphatase & bilirubin  -Acute encephalopathy due to  sepsis  - Anemia due to above  - Acute fever on 5/19 likely from secondary VAP  - Secondary VAP R sided pneumonia  - decubitus ulcer sacral.   - R lung abscess 8x6 cm     Plan   - New CT placed in the right chest 6/15.  Pending evaluation of fluid.  Just back from IR, so specimen not processed by lab yet.  - will need another 4 week course of abx from drain placement   - Cefazolin order, fell off, will re-order  - new 4 week target date 7/12/21  - Will need suppression after therapy for her HW to include spinal stimulator,  shunt. Given MSSA - Keflex appropriate    Dispo: Pt has Medicare/[employer]. Does not have transportation but can make arrangements to followup weekly in [facility]. Will arrange [facility] once patient is ready. Will plan on using ertapenem 1gm Q 24 hours.       Message was left with [facility] liaisonHali.      40 min spent with 50% face to face care.   Case disussed with patient,  and RN

## 2021-06-15 NOTE — ASSESSMENT & PLAN NOTE
complicated h/o R>L empyema s/p multiple chest tubes and decortication over the last 2 months, in the setting of septic emboli from MSSA bacteremia.  Repeat CT on 6/10 revealed possible residual empyema 8x6cm right anterior chest. IR guided chest tube placed on 6/15.  -This doesnot appear to be a complicated para pneumonic effusion as patient has been afebrile, no evidence of leukocytosis and is clinically stable otherwise.  -Minimal chest tube drainage, Gram stain no organisms, unlikely infection. This is most likely postsurgical hemothorax.     Plan:  - chest tube removed. CXR after removal, no appreciable pneumothorax.  - pulmonology will sign off, please feel free to reach out with any further questions.

## 2021-06-15 NOTE — PROGRESS NOTES
Received bedside report and accepted care of patient. Patient currently resting in bed in no visible or stated signs of distress. Bed alarm in place, controls on and bed in locked and lowest position. Call light and personal possessions within reach. Patient educated about use of call light and verbalized understanding.     Assessment/description of ears? Bilateral pink, intact and blanching  Which preventative measures are in place for the ears? Gray foam padding on oxygen silicone tubing, encourage pt to remove glasses prior to going to sleep    Assessment/description of elbows?  Bilateral pink, intact and blanching    Which preventative measures are in place for the elbows?  Pressure redistribution mattress with waffle overlay, encourage pt to reposition, ambulate, pillows for support/positioning    Assessment/description of sacrum? Red/pink, rashy, and blanching  Which preventative measures are in place for the sacrum? Pressure redistribution mattress with waffle overlay, encourage pt to reposition, ambulate, pillows for support/positioning    Assessment/description of heels?  Bilateral pink, intact and blanching    Which preventative measures are in place for the heels?  Pressure redistribution mattress with waffle overlay, encourage pt to reposition, ambulate, pillows for support/positioning    Which devices are in place?  PIV, NC  Description of skin under devices:  CDI  Which preventative measures are in place under devices? Q shift assessment, gray foam padding on silicone oxygen silicone tubing    Other:  DTI's R great toe and 3rd left toe

## 2021-06-15 NOTE — CARE PLAN
The patient is Stable - Low risk of patient condition declining or worsening    Shift Goals  Clinical Goals: NPO for IR procedure  Patient Goals:   Family Goals: NA    Progress made toward(s) clinical / shift goals:  Pt remained NPO from midnight to present time in prep for IR procedure.    Patient is not progressing towards the following goals:

## 2021-06-15 NOTE — DISCHARGE PLANNING
Agency/Facility Name: [facility]  Spoke To: Marty  Outcome: Order has been received.  Walker will be delivered 24 hrs before pt discharges.  Bedside commode is not covered by the insurance.

## 2021-06-15 NOTE — PROGRESS NOTES
Assumed pt care at shift change.  Pt alert/oriented 4, resting in bed.  Pt is on RA, with no signs of distress or discomfort.  Discussed POC with pt; answered questions.   Safety precautions in place, bed locked and in lowest position, call light and personal belongings within reach.  No further needs at this time.      Assessment/description of ears?  Bilateral, pink, intact, and blanching  Which preventative measures are in place for the ears?  Silicone oxygen tubing with gray foam, encourage pt to remove glasses prior to sleep    Assessment/description of elbows?  Bilateral, pink, intact, and blanching    Which preventative measures are in place for the elbows?  Waffle overlay, encourage pt to turn frequently while in bed, ambulate, , pillows for support/repositioning    Assessment/description of sacrum?  Red/pink, rashy, blanching    Which preventative measures are in place for the sacrum? Waffle overlay, encourage pt to turn frequently while in bed, ambulate, barrier cream, pillows for support/repositioning      Assessment/description of heels?  Bilateral, pink, intact, and blanching   Which preventative measures are in place for the heels? Waffle overlay, encourage pt to turn frequently while in bed, ambulate, pillows for support/repositioning        Which devices are in place?  PICC, NC  Description of skin under devices: CDI  Which preventative measures are in place under devices? Grey foam padding on oxygen silicone    Other: DTI's:  R great toe; 3rd left toe

## 2021-06-15 NOTE — PROGRESS NOTES
Hospital Medicine Daily Progress Note    Date of Service  6/15/2021    Chief Complaint  50 y.o. female admitted 4/16/2021 with altered mental status    Hospital Course  This is a 50-year-old female with a past medical history of pseudotumor cerebri s/p  shunt implantation by Dr. 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  [facility] infectious disease is managing antibiotics for the patient.  Dr. Candelaria surgery removed the port on 4/12.  The patient was further identified to have endocarditis of the tricuspid valve, a lumbar puncture performed on 4/14 showed pleocytosis with negative Gram stain.  The patient suffered worsening leukocytosis and was found to have septic emboli per CT.  Initially a small pleural catheter was placed.  Significant loculations were encountered.  MSSA empyema was diagnosed.  Neurosurgery was consulted to comment on possibly removal of a  shunt, this was felt not appropriate at this time.  The patient remained on mechanical ventilation for 15 days, then a perc trach was placed.  The patient was eventually liberated from mechanical ventilation, the patient did have a right thoracoscopy and decortication of the lung performed on 4/28 by Dr. Ganser.  The patient was of identified to have multiple sources for MSSA including mitral valve, tricuspid valve vegetation, port, spinal stimulator,  shunt possibly.  Back to the OR for VAT and decortication on 5/17  Trach de-canulated 6/8       Interval Problem Update    ID Consult, Pulmonology and surgeon consult appreciated;  Plan: since patient had decorication done in  April and right side thoracotomy in may, won't be able to get a chest tube in, recommended IR for draining tube, ordered    NPO for procedure    On ancef  Afebrile  No other complaints    Pain management after procedure.    Consultants/Specialty  ID  Pulmonary  surgeon    Code Status  Full Code    Disposition  Abx's stop date 6/14  Plan repeat CT chest prior to completion of Abx's    Review of Systems  Review of Systems   Constitutional: Negative for chills and fever.   HENT: Negative for sore throat.    Eyes: Negative for blurred vision and double vision.   Respiratory: Negative for cough and shortness of breath.    Cardiovascular: Negative for chest pain, palpitations and leg swelling.   Gastrointestinal: Negative for abdominal pain, diarrhea, nausea and vomiting.   Genitourinary: Negative for dysuria and urgency.   Musculoskeletal: Negative for back pain.   Skin: Negative for rash.   Neurological: Negative for dizziness, loss of consciousness and headaches.        Physical Exam  Temp:  [35.9 °C (96.7 °F)-36.4 °C (97.6 °F)] 36.1 °C (97 °F)  Pulse:  [84-90] 84  Resp:  [16-20] 18  BP: (100-137)/(60-79) 100/60  SpO2:  [95 %-96 %] 96 %    Physical Exam  Vitals reviewed.   Constitutional:       General: She is not in acute distress.     Appearance: Normal appearance. She is well-developed. She is not diaphoretic.   HENT:      Head: Normocephalic and atraumatic.   Eyes:      General: No scleral icterus.        Right eye: No discharge.         Left eye: No discharge.   Neck:      Vascular: No JVD.   Cardiovascular:      Rate and Rhythm: Normal rate and regular rhythm.      Heart sounds: Murmur heard.     Pulmonary:      Effort: Pulmonary effort is normal. No respiratory distress.      Breath sounds: No stridor. No wheezing or rales.   Abdominal:      Palpations: Abdomen is soft.   Musculoskeletal:         General: No tenderness.      Right lower leg: No edema.      Left lower leg: No edema.      Comments: Right side  chest thoracotomy scar with suture.   Skin:     General: Skin is warm and dry.      Findings: No rash (butt).   Neurological:      General: No focal deficit present.      Mental Status: She is alert and oriented to person, place, and time.   Psychiatric:         Mood and Affect: Mood normal.         Thought Content: Thought content normal.         Fluids    Intake/Output Summary (Last 24 hours) at 6/15/2021 0722  Last data filed at 6/14/2021 2100  Gross per 24 hour   Intake 960 ml   Output --   Net 960 ml       Laboratory  Recent Labs     06/14/21  0055 06/15/21  0315   WBC 4.5* 4.2*   RBC 4.20 3.61*   HEMOGLOBIN 11.8* 10.1*   HEMATOCRIT 37.6 32.3*   MCV 89.5 89.5   MCH 28.1 28.0   MCHC 31.4* 31.3*   RDW 48.2 48.4   PLATELETCT 359 335   MPV 9.5 9.7     Recent Labs     06/14/21 0055 06/15/21  0315   SODIUM 127* 132*   POTASSIUM 4.1 3.7   CHLORIDE 93* 97   CO2 26 28   GLUCOSE 76 76   BUN 7* 5*   CREATININE 0.27* 0.23*   CALCIUM 8.9 8.8                   Imaging  EC-ECHOCARDIOGRAM COMPLETE W/ CONT   Final Result      CT-CHEST (THORAX) WITH   Final Result      1.  Near complete resolution of multifocal bilateral pulmonary airspace process consistent with pneumonia      2.  Loculated right anterior and anterior/lateral rim-enhancing pleural fluid collection consistent with an empyema. Extent is from just below the lung apex to the diaphragm and the largest component measures 8.0 x 6.0 cm transversely      3.  Ill-defined left upper lobe pulmonary nodule which are most likely postinflammatory      4.  Hepatic steatosis and hepatomegaly                  DX-CHEST-PORTABLE (1 VIEW)   Final Result      1.  Interval removal of the right-sided chest tubes. No pneumothorax is identified.   2.  Airspace opacities in the right are mildly improved and may represent atelectasis/consolidation.   3.  There is likely a small right pleural effusion.   4.  Patchy opacities on the left are mildly improved.   5.  Interval removal of the  enteric tube.      DX-CHEST-PORTABLE (1 VIEW)   Final Result         1. Stable lines and tubes.   2. Stable ill-defined bilateral pulmonary opacities, right worse than left. No large pleural effusions.         DX-CHEST-PORTABLE (1 VIEW)   Final Result      1.  Decrease in volume of right pleural fluid collection/empyema with 2 right chest tubes in place. No pneumothorax identified.      2.  No focal consolidation in the left lung.      DX-CHEST-PORTABLE (1 VIEW)   Final Result      No significant interval change.      DX-CHEST-PORTABLE (1 VIEW)   Final Result         1.  Hazy right pulmonary infiltrates and/or effusion, stable compared to prior study.      DX-CHEST-PORTABLE (1 VIEW)   Final Result         1.  Hazy right pulmonary infiltrates and/or effusion, stable compared to prior study.      DX-CHEST-PORTABLE (1 VIEW)   Final Result      1.  All lines and tubes appear appropriately located      2.  Consolidation throughout the right lung consistent with pneumonia      DX-CHEST-PORTABLE (1 VIEW)   Final Result         1.  Hazy right pulmonary infiltrates and/or effusion, increased compared to prior study.      DX-ABDOMEN FOR TUBE PLACEMENT   Final Result      Feeding tube looped in the stomach.      DX-CHEST-PORTABLE (1 VIEW)   Final Result         1. Stable lines and tubes. No pneumothorax detected.   2. Persistent opacities in the right perihilar region and in the right lung periphery, similar to prior study. Left lung is essentially clear.      DX-CHEST-PORTABLE (1 VIEW)   Final Result      Postoperative changes of the right chest with placement of an additional chest tube. There is improved aeration in the right lung. No significant pleural effusion or definite pneumothorax.      Right IJ central venous catheter tip projects over the proximal right atrium.      Hypoinflation with interstitial and hazy pulmonary opacities.      DX-CHEST-PORTABLE (1 VIEW)   Final Result         1.  Hazy right pulmonary  infiltrates and/or effusion, similar compared to prior study.      DX-ABDOMEN FOR TUBE PLACEMENT   Final Result      Feeding tube tip projects over the expected location the gastric antrum.      DX-CHEST-PORTABLE (1 VIEW)   Final Result         1.  Hazy right pulmonary infiltrates and/or effusion, similar compared to prior study.      DX-CHEST-LIMITED (1 VIEW)   Final Result      1.  No pneumothorax. Only one chest tube is now seen in the right lung.   2.  The chest is otherwise stable.      DX-CHEST-PORTABLE (1 VIEW)   Final Result         1.  Hazy right pulmonary infiltrates and/or effusion, similar compared to prior study.      DX-CHEST-PORTABLE (1 VIEW)   Final Result         1.  Hazy right pulmonary infiltrates and/or effusion, similar compared to prior study.      DX-ABDOMEN FOR TUBE PLACEMENT   Final Result         1.  Nonspecific bowel gas pattern.   2.  Dobbhoff tube tip terminates overlying the expected location of the gastric antrum.   3.  Hazy bilateral lung base infiltrates, greater on the right.      DX-CHEST-PORTABLE (1 VIEW)   Final Result         1.  Hazy right pulmonary infiltrates and/or effusion, similar compared to prior study.      US-RUQ   Final Result         1.  Hepatomegaly   2.  Mild intrahepatic and extrahepatic biliary ductal dilatation, commonly associated with postcholecystectomy physiology, consider causes of biliary obstruction with additional workup as clinically appropriate      CT-CHEST,ABDOMEN,PELVIS WITH   Final Result      1.  Large empyema in the RIGHT hemithorax, slightly decreased in size from prior exam with chest tubes present.   2.  Consolidation remains.   3.  Multiple cavitary lesions again seen in the LEFT upper lobe, minimally decreased from prior, concerning for septic emboli.   4.  Supportive tubing is unchanged.   5.  Intrahepatic and extrahepatic biliary dilation, likely postoperative although distal stricture, stone or mass remain possibilities.   6.  Moderate  pelvic fluid, nonspecific.   7.  No evidence of bowel obstruction or perforation.      DX-CHEST-PORTABLE (1 VIEW)   Final Result         1.  Hazy right pulmonary infiltrates and/or effusion, similar compared to prior study.      DX-CHEST-PORTABLE (1 VIEW)   Final Result         No significant interval change.            DX-CHEST-PORTABLE (1 VIEW)   Final Result         Interval removal of left central venous catheter. Interval adjustment of right PICC with tip in the SVC.         DX-ABDOMEN FOR TUBE PLACEMENT   Final Result      Feeding tube tip at the mid to distal stomach.      IR-PICC LINE PLACEMENT W/ GUIDANCE > AGE 5   Final Result                  Ultrasound-guided PICC placement performed by qualified nursing staff as    above.          DX-CHEST-PORTABLE (1 VIEW)   Final Result      1.  Stable cardiopulmonary findings.   2.  See comments above regarding the right-sided PICC position.      DX-CHEST-PORTABLE (1 VIEW)   Final Result      Stable chest findings compared to 5/5.      DX-CHEST-PORTABLE (1 VIEW)   Final Result      Stable chest findings compared with 5/3.      DX-ABDOMEN FOR TUBE PLACEMENT   Final Result         Feeding tube with tip projecting over the expected area of the stomach.      DX-CHEST-PORTABLE (1 VIEW)   Final Result      Stable chest findings compared with prior.      DX-CHEST-PORTABLE (1 VIEW)   Final Result         1. No significant interval change.      US-EXTREMITY ARTERY LOWER UNILAT RIGHT   Final Result      DX-CHEST-PORTABLE (1 VIEW)   Final Result         1. No significant interval change.      IR-PICC LINE PLACEMENT W/ GUIDANCE > AGE 5   Final Result                  Ultrasound-guided PICC placement performed by qualified nursing staff as    above.          DX-CHEST-PORTABLE (1 VIEW)   Final Result         1.  Pulmonary edema and/or infiltrates, similar to prior study.   2.  Stable opacification right lung, likely effusion. Thoracostomy tubes are in place.   3.  Multiple  cavitary appearing left pulmonary lesions, see CT performed April 27, 2021 for further characterization      DX-CHEST-PORTABLE (1 VIEW)   Final Result         1.  Pulmonary edema and/or infiltrates, similar to prior study.   2.  Single opacification right lung, likely effusion. Thoracostomy tubes are in place.   3.  Multiple cavitary appearing left pulmonary lesions, see CT performed April 27, 2021 for further characterization   4.  Soft tissue gas in the right chest wall      DX-CHEST-PORTABLE (1 VIEW)   Final Result         1.  Pulmonary edema and/or infiltrates, similar to prior study.   2.  Increased opacification right lung, likely increased effusion. Thoracostomy tubes are in place.   3.  Multiple cavitary appearing left pulmonary lesions, see CT performed April 27, 2021 for further characterization   4.  Soft tissue gas in the right chest wall      DX-CHEST-PORTABLE (1 VIEW)   Final Result         1.  Pulmonary edema and/or infiltrates, similar to prior study.   2.  Right pneumothorax, stable compared to prior study, right thoracostomy tubes remain in place   3.  Multiple cavitary appearing left pulmonary lesions, see CT performed April 27, 2021 for further characterization   4.  Soft tissue gas in the right chest wall      DX-CHEST-PORTABLE (1 VIEW)   Final Result         1.  Pulmonary edema and/or infiltrates, similar to prior study.   2.  Right pneumothorax, interval decreased compared to prior study, interval placement of right thoracostomy tubes is noted.   3.  Multiple cavitary appearing left pulmonary lesions, see CT performed yesterday for further characterization   4.  Soft tissue gas in the right chest wall      DX-CHEST-PORTABLE (1 VIEW)   Final Result         1.  Pulmonary edema and/or infiltrates, similar to prior study.   2.  Right pneumothorax, stable compared to prior study, interval removal of right thoracostomy tube is noted.   3.  Multiple cavitary appearing left pulmonary lesions, see CT  performed yesterday for further characterization   4.  Soft tissue gas in the right chest wall      DX-CHEST-LIMITED (1 VIEW)   Final Result         No significant interval change.      CT-CTA CHEST PULMONARY ARTERY W/ RECONS   Final Result         No CT evidence of pulmonary embolus.      Previous right chest tube were removed.      Grossly similar in size of the loculated right hydropneumothorax but there is increased layering fluid component. Unchanged mild shift of the mediastinal to the left.      Redemonstration of a pigtail catheter in the medial left lung base. Grossly similar multiple cavitary lesions in the left lung.      US-EXTREMITY VENOUS LOWER BILAT   Final Result      POCT BUTTERFLY-DUPLEX SCAN EXTREMITY VEINS LIMITED   Final Result      DX-CHEST-PORTABLE (1 VIEW)   Final Result         1.  Pulmonary edema and/or infiltrates, similar to prior study.   2.  Right pneumothorax, somewhat decreased to prior study, interval removal of right thoracostomy tube is noted.   3.  Multiple cavitary appearing left pulmonary lesions, see CT performed yesterday for further characterization   4.  Soft tissue gas in the right chest wall      DX-CHEST-PORTABLE (1 VIEW)   Final Result         1.  Pulmonary edema and/or infiltrates, similar to prior study.   2.  Right pneumothorax, similar to prior study with thoracostomy tube in place.   3.  Multiple cavitary appearing left pulmonary lesions, see CT performed yesterday for further characterization      CT-CHEST (THORAX) W/O   Final Result      Large loculated right hydropneumothorax with interval increase in size of the pneumothorax and pleural fluid.      Right chest tube is in the posterior right thorax.      There is mediastinal shift to the left compatible with tension.      Small pericardial effusion.      Small loculated left pleural effusion with overlying atelectasis/consolidation.   Pigtail catheter is seen at the medial left lung base. Pleural effusion has  decreased in size compared to prior.      There are multiple foci of air extending from the right lung to the pleural space suspicious for a bronchopleural fistula.      Multiple cavitary lesions bilaterally with mild interval decrease of the solid component of the cavitary nodules.      Noncavitary 3.1 cm masslike density is seen at the left lung base.      Findings may be related to septic emboli, malignancy or multifocal abscesses. Short interval follow-up recommended.      Soft tissue air on the right is again seen.      Splenomegaly      Findings discussed with Leti Sun.      DX-CHEST-PORTABLE (1 VIEW)   Final Result      No significant change from prior exam.      CT-HEAD W/O   Final Result      1.  RIGHT frontal ventriculostomy catheter unchanged in position.   2.  Mild cortical atrophy.   3.  No intracranial hemorrhage.   4.  Apparent dural thickening overlying the clivus.  Consider further evaluation with contrast-enhanced MRI.      DX-CHEST-LIMITED (1 VIEW)   Final Result      Interval left basilar pigtail catheter with diminished atelectasis, pleural fluid      Stable right hydropneumothorax with thoracostomy tube in place, considerable soft tissue gas and partial lung collapse      IR-CHEST TUBE-EMPYEMA LEFT   Final Result      1.  CT GUIDED LEFT  10 [employer]ali PIGTAIL CHEST TUBE PLACEMENT FOR POSSIBLE EMPYEMA YIELDING OLD BLOODY FLUID.      2. A CHEST RADIOGRAPH IS FORTHCOMING.      EC-MICAH W/O CONT   Final Result      US-ABDOMEN LTD (SOFT TISSUE)   Final Result      No fluid seen surrounding the electronic implanted spinal stimulator device.      DX-CHEST-LIMITED (1 VIEW)   Final Result      1.  Large right hydropneumothorax with right-sided chest tube in place, likely stable to slightly decreased from prior study.   2.  Small left pleural effusion. Mild improved aeration in the left lung.   3.  Bilateral pulmonary opacities which could be related to combination of atelectasis, edema and/or infection..       CT-CTA HEAD WITH & W/O-POST PROCESS   Final Result      1.  Patent carotid and vertebral arteries.      2.  Again seen right frontal the jugular peritoneal shunt catheter. There is mild increase in volume of the lateral and third ventricles.      CT-CHEST (THORAX) W/O   Final Result      1.  Interval placement of a right-sided chest tube into a large loculated right-sided pleural effusion. There is now seen a large right-sided hydropneumothorax in the area that previously seen fluid. The chest tube extends into that hydropneumothorax.    There is some residual pleural fluid seen as well. A hydropneumothorax is somewhat loculated with components most prominently inferiorly and medially.      2.  New loculated left pleural effusion.      3.  Again seen multiple bilateral cavitary pulmonary masses which are more prominent than previously seen with increasing cavitation and measure up to 3 cm size. Differential diagnosis includes septic emboli, multifocal abscesses and neoplasm.      4.  Large amount of subcutaneous emphysema on the right.      5.  Splenomegaly.      6.  Multiple support devices.      Fleischner Society pulmonary nodule recommendations:         DX-CHEST-LIMITED (1 VIEW)   Final Result      1.  Support devices as described above.      2.  Right chest tube present with a again seen right-sided pneumothorax, possibly increased in size and loculated.      3.  Worsening bilateral pulmonary infiltrates.      DX-ABDOMEN FOR TUBE PLACEMENT   Final Result      Cortrak feeding tube tip projects in the region of the duodenal bulb.      DX-CHEST-LIMITED (1 VIEW)   Final Result      Loculated right pneumothorax is decreased in size compared to prior.      Small left pleural effusion.      Small loculated right pleural effusion.      Extensive bilateral airspace opacities are not significantly changed.      Soft tissue air on the right is again noted.      DX-CHEST-LIMITED (1 VIEW)   Final Result      Decreased  partially loculated right pleural fluid with increased pleural air. Right chest tube is in place.      Increased hazy opacity in the left lung possibly atelectasis.         US-ABDOMEN LTD (SOFT TISSUE)   Final Result      No drainable fluid collection.      DX-CHEST-PORTABLE (1 VIEW)   Final Result      Decreased partially loculated right pleural fluid with increased pleural air. Right chest tube is in place.      No other significant change.      DX-CHEST-PORTABLE (1 VIEW)   Final Result         1.  Pulmonary edema and/or infiltrates are identified, which are somewhat decreased since the prior exam.   2.  Moderate loculated appearing right pleural effusion, slightly decreased since prior      DX-ABDOMEN FOR TUBE PLACEMENT   Final Result      Feeding tube tip at the distal stomach.      DX-CHEST-PORTABLE (1 VIEW)   Final Result         1.  New chest tube is noted in the right lower hemithorax.      2.  Right pleural effusion is again identified which appears similar to the prior exam.      3.  No new infiltrates or consolidations are identified.      DX-CHEST-PORTABLE (1 VIEW)   Final Result         1.  Pulmonary edema and/or infiltrates are identified, which are stable since the prior exam.   2.  Moderate loculated appearing right pleural effusion      DX-CHEST-PORTABLE (1 VIEW)   Final Result         No significant interval change.      POCT BUTTERFLY-ECHOCARDIOGRAM LTD W/O CONT    (Results Pending)   IR-DRAIN-LUNG FOR CYST OR ABSCESS    (Results Pending)        Assessment/Plan  * Acute bacterial endocarditis- (present on admission)  Assessment & Plan  Mitral valve, tricuspid valve  MSSA  Continue Ancef through 6/14/2021    Pulmonary abscess (HCC)  Assessment & Plan  From septic emboli  Now s/p decortication and thoracotomy    IR draining tube on 6/15/2021    Resume eliquis after    Vaginal itching  Assessment & Plan  Topical Chlortrimazole    Debility  Assessment & Plan  Acute on chronic  Recovering well: may  need rehab vs DC with HH if she cont's to do well    Elevated alkaline phosphatase level- (present on admission)  Assessment & Plan  The patient not able to tolerate a MRCP at this time, ultrasound grossly unremarkable, observe    Agitation requiring sedation protocol- (present on admission)  Assessment & Plan  resolved    Goals of care, counseling/discussion  Assessment & Plan  Patient with overall high risk of morbidity and mortality given her gravely ill state and prior chronic medical conditions  Palliative care following    Spinal cord stimulator status- (present on admission)  Assessment & Plan  Patient's stimulator is Nuvectra. It is FDA approved as MRI compatible, but the company has gone out of business, so there is no support team to assist with MRI.  Thus, if MRI were performed, our radiologists would need to protocol it correctly to manage the stimulator. The former rep from [employer] is Andre, [phone].  Information obtained from Dr. Mahoney's office, [phone].     Per  (5/4/2021), it is not MRI compatible unfortunately.     Thrombocytosis (HCC)  Assessment & Plan  Acute reaction since resolved    Anasarca- (present on admission)  Assessment & Plan  Improved    Pleural effusion, left  Assessment & Plan  resolved    Trapped lung  Assessment & Plan  Now s/p decortication   Chest tubes removed on 5/26, observe    Fever  Assessment & Plan  Resolved, monitor, antibiotic therapy    Pneumonia  Assessment & Plan  MSSA  Ancef per ID through 6/14  Repeat CT Chest showing some residual empyema: have DW ID and will plan to tap.  Prefer not to go back to the OR given the pt's prolonged and complex hospital course    History of vasculitis- (present on admission)  Assessment & Plan  Does not appear to be a current issue    Bacteremia due to methicillin susceptible Staphylococcus aureus (MSSA)- (present on admission)  Assessment & Plan  Ancef through 6/14    CSF pleocytosis- (present on  admission)  Assessment & Plan  Infectious disease managing    Empyema of right pleural space (HCC)- (present on admission)  Assessment & Plan  Patient s/p multiple chest tubes, thoracotomy and decortication  Now s/p second decortication 5/17  Per thoracic surgery note, sutures to be removed on 6/9  Plan Abx's through 6/14      Repeat CT still shows empyema, ID requested US thoracocentesis, which less likely to be drained out due to empyema nature,  Pulmonology consult and surgery recommendation: IR drain    Acute blood loss anemia  Assessment & Plan  Likely secondary to hemothorax  300 cc of blood removed after chest tube placed at [facility]  Hb has been stable around 9-10    Prolonged Q-T interval on ECG- (present on admission)  Assessment & Plan  Resolved after correcting metabolic issues  Cont to follow   Monitor electrolytes, acidosis etc    Acute respiratory failure with hypoxia (HCC)- (present on admission)  Assessment & Plan  Secondary to MSSA bacteremia, empyema, pulmonary septic emboli  S/p tracheostomy, trach is been capped for more than 24 hours, I discussed with respiratory therapy and evaluated the patient, we agreed decannulation is appropriate  Postacute planning    History of complicated migraines- (present on admission)  Assessment & Plan  Monitor    GERD (gastroesophageal reflux disease)- (present on admission)  Assessment & Plan  History of, as needed treatment    Hyponatremia  Assessment & Plan  Mild - asymptomatic  Monitor    Tachycardia  Assessment & Plan  Persistent, likely secondary with multiple underlying ongoing conditions  Monitor  Slowly trending down; now 80-90s    Pseudotumor cerebri  Assessment & Plan  History of  shunt, neurosurgery opted against removal    H/O: CVA (cerebrovascular accident)- (present on admission)  Assessment & Plan  Monitor    Chronic pain syndrome- (present on admission)  Assessment & Plan  Pre-existing, pain management    Port or reservoir  infection  Assessment & Plan  S/p removal    Thrombocytopenia (HCC)- (present on admission)  Assessment & Plan  Transient, resolved    Anemia- (present on admission)  Assessment & Plan  Monitor, transfuse as indicated    Septic shock (HCC)- (present on admission)  Assessment & Plan  Sepsis is resolved    Sepsis (HCC)  Assessment & Plan  Sepsis has resolved    Hypomagnesemia  Assessment & Plan  Repeat lab tomorrow    Hypokalemia  Assessment & Plan  Monitor, replace and recheck    Anxiety- (present on admission)  Assessment & Plan  Continue Cymbalta and Paxil  As needed Valium    S/P  shunt- (present on admission)  Assessment & Plan  History of, Dr. Oh felt there is currently no need to remove    Folliculitis  Assessment & Plan  Improved    Acute encephalopathy- (present on admission)  Assessment & Plan  Per neurosurgery no need to remove  shunt  Improved         VTE prophylaxis: enoxaparin on hold for IR drain  scd

## 2021-06-16 ENCOUNTER — APPOINTMENT (OUTPATIENT)
Dept: RADIOLOGY | Facility: MEDICAL CENTER | Age: 51
DRG: 003 | End: 2021-06-16
Attending: STUDENT IN AN ORGANIZED HEALTH CARE EDUCATION/TRAINING PROGRAM
Payer: MEDICARE

## 2021-06-16 LAB
ANION GAP SERPL CALC-SCNC: 8 MMOL/L (ref 7–16)
BUN SERPL-MCNC: 6 MG/DL (ref 8–22)
CALCIUM SERPL-MCNC: 8.9 MG/DL (ref 8.5–10.5)
CHLORIDE SERPL-SCNC: 96 MMOL/L (ref 96–112)
CO2 SERPL-SCNC: 28 MMOL/L (ref 20–33)
CREAT SERPL-MCNC: 0.24 MG/DL (ref 0.5–1.4)
ERYTHROCYTE [DISTWIDTH] IN BLOOD BY AUTOMATED COUNT: 48.7 FL (ref 35.9–50)
GLUCOSE SERPL-MCNC: 78 MG/DL (ref 65–99)
HCT VFR BLD AUTO: 34.2 % (ref 37–47)
HGB BLD-MCNC: 10.7 G/DL (ref 12–16)
MCH RBC QN AUTO: 28 PG (ref 27–33)
MCHC RBC AUTO-ENTMCNC: 31.3 G/DL (ref 33.6–35)
MCV RBC AUTO: 89.5 FL (ref 81.4–97.8)
PLATELET # BLD AUTO: 399 K/UL (ref 164–446)
PMV BLD AUTO: 9.8 FL (ref 9–12.9)
POTASSIUM SERPL-SCNC: 4 MMOL/L (ref 3.6–5.5)
RBC # BLD AUTO: 3.82 M/UL (ref 4.2–5.4)
SODIUM SERPL-SCNC: 132 MMOL/L (ref 135–145)
WBC # BLD AUTO: 4.2 K/UL (ref 4.8–10.8)

## 2021-06-16 PROCEDURE — A9270 NON-COVERED ITEM OR SERVICE: HCPCS | Performed by: STUDENT IN AN ORGANIZED HEALTH CARE EDUCATION/TRAINING PROGRAM

## 2021-06-16 PROCEDURE — 71045 X-RAY EXAM CHEST 1 VIEW: CPT

## 2021-06-16 PROCEDURE — 80048 BASIC METABOLIC PNL TOTAL CA: CPT

## 2021-06-16 PROCEDURE — 700102 HCHG RX REV CODE 250 W/ 637 OVERRIDE(OP): Performed by: STUDENT IN AN ORGANIZED HEALTH CARE EDUCATION/TRAINING PROGRAM

## 2021-06-16 PROCEDURE — 700111 HCHG RX REV CODE 636 W/ 250 OVERRIDE (IP): Performed by: INTERNAL MEDICINE

## 2021-06-16 PROCEDURE — A9270 NON-COVERED ITEM OR SERVICE: HCPCS | Performed by: HOSPITALIST

## 2021-06-16 PROCEDURE — 700102 HCHG RX REV CODE 250 W/ 637 OVERRIDE(OP): Performed by: HOSPITALIST

## 2021-06-16 PROCEDURE — 700111 HCHG RX REV CODE 636 W/ 250 OVERRIDE (IP): Performed by: STUDENT IN AN ORGANIZED HEALTH CARE EDUCATION/TRAINING PROGRAM

## 2021-06-16 PROCEDURE — 99232 SBSQ HOSP IP/OBS MODERATE 35: CPT | Performed by: STUDENT IN AN ORGANIZED HEALTH CARE EDUCATION/TRAINING PROGRAM

## 2021-06-16 PROCEDURE — 700111 HCHG RX REV CODE 636 W/ 250 OVERRIDE (IP): Mod: JG | Performed by: STUDENT IN AN ORGANIZED HEALTH CARE EDUCATION/TRAINING PROGRAM

## 2021-06-16 PROCEDURE — 99233 SBSQ HOSP IP/OBS HIGH 50: CPT | Performed by: INTERNAL MEDICINE

## 2021-06-16 PROCEDURE — 85027 COMPLETE CBC AUTOMATED: CPT

## 2021-06-16 PROCEDURE — 770006 HCHG ROOM/CARE - MED/SURG/GYN SEMI*

## 2021-06-16 RX ADMIN — CEFAZOLIN SODIUM 2 G: 2 INJECTION, SOLUTION INTRAVENOUS at 05:17

## 2021-06-16 RX ADMIN — DULOXETINE HYDROCHLORIDE 60 MG: 60 CAPSULE, DELAYED RELEASE ORAL at 17:12

## 2021-06-16 RX ADMIN — DIVALPROEX SODIUM 500 MG: 125 CAPSULE ORAL at 04:11

## 2021-06-16 RX ADMIN — CEFAZOLIN SODIUM 2 G: 2 INJECTION, SOLUTION INTRAVENOUS at 20:09

## 2021-06-16 RX ADMIN — OXYCODONE 5 MG: 5 TABLET ORAL at 10:02

## 2021-06-16 RX ADMIN — DOCUSATE SODIUM 50 MG AND SENNOSIDES 8.6 MG 2 TABLET: 8.6; 5 TABLET, FILM COATED ORAL at 04:11

## 2021-06-16 RX ADMIN — ENOXAPARIN SODIUM 40 MG: 40 INJECTION SUBCUTANEOUS at 04:14

## 2021-06-16 RX ADMIN — GABAPENTIN 300 MG: 300 CAPSULE ORAL at 20:09

## 2021-06-16 RX ADMIN — DIPHENHYDRAMINE HYDROCHLORIDE 50 MG: 50 INJECTION INTRAMUSCULAR; INTRAVENOUS at 10:43

## 2021-06-16 RX ADMIN — ACETAMINOPHEN 500 MG: 325 TABLET ORAL at 14:32

## 2021-06-16 RX ADMIN — PAROXETINE HYDROCHLORIDE 10 MG: 10 TABLET, FILM COATED ORAL at 04:11

## 2021-06-16 RX ADMIN — DIPHENHYDRAMINE HYDROCHLORIDE 50 MG: 50 INJECTION INTRAMUSCULAR; INTRAVENOUS at 23:19

## 2021-06-16 RX ADMIN — OXYCODONE 5 MG: 5 TABLET ORAL at 20:09

## 2021-06-16 RX ADMIN — DULOXETINE HYDROCHLORIDE 60 MG: 60 CAPSULE, DELAYED RELEASE ORAL at 04:11

## 2021-06-16 RX ADMIN — GABAPENTIN 300 MG: 300 CAPSULE ORAL at 04:11

## 2021-06-16 RX ADMIN — OXYCODONE 5 MG: 5 TABLET ORAL at 04:11

## 2021-06-16 RX ADMIN — OXYCODONE 5 MG: 5 TABLET ORAL at 14:32

## 2021-06-16 RX ADMIN — ACETAMINOPHEN 500 MG: 325 TABLET ORAL at 10:04

## 2021-06-16 RX ADMIN — RISPERIDONE 2 MG: 2 TABLET ORAL at 04:11

## 2021-06-16 RX ADMIN — ACETAMINOPHEN 500 MG: 325 TABLET ORAL at 20:09

## 2021-06-16 RX ADMIN — DIVALPROEX SODIUM 500 MG: 125 CAPSULE ORAL at 20:09

## 2021-06-16 RX ADMIN — DIPHENHYDRAMINE HYDROCHLORIDE 50 MG: 50 INJECTION INTRAMUSCULAR; INTRAVENOUS at 17:13

## 2021-06-16 RX ADMIN — GABAPENTIN 300 MG: 300 CAPSULE ORAL at 14:31

## 2021-06-16 RX ADMIN — DIPHENHYDRAMINE HYDROCHLORIDE 50 MG: 50 INJECTION INTRAMUSCULAR; INTRAVENOUS at 04:12

## 2021-06-16 RX ADMIN — RISPERIDONE 2 MG: 2 TABLET ORAL at 17:13

## 2021-06-16 RX ADMIN — DIVALPROEX SODIUM 500 MG: 125 CAPSULE ORAL at 14:32

## 2021-06-16 RX ADMIN — METHADONE HYDROCHLORIDE 30 MG: 10 TABLET ORAL at 05:16

## 2021-06-16 RX ADMIN — ALTEPLASE 2 MG: 2.2 INJECTION, POWDER, LYOPHILIZED, FOR SOLUTION INTRAVENOUS at 00:30

## 2021-06-16 RX ADMIN — CEFAZOLIN SODIUM 2 G: 2 INJECTION, SOLUTION INTRAVENOUS at 14:33

## 2021-06-16 ASSESSMENT — ENCOUNTER SYMPTOMS
MYALGIAS: 0
TINGLING: 0
VOMITING: 0
DOUBLE VISION: 0
FALLS: 0
COUGH: 1
CHILLS: 0
BLURRED VISION: 0
DEPRESSION: 0
EYE PAIN: 0
SORE THROAT: 0
FEVER: 0
NAUSEA: 0
SHORTNESS OF BREATH: 0
NERVOUS/ANXIOUS: 0
MEMORY LOSS: 0
BACK PAIN: 0
PALPITATIONS: 0
HEARTBURN: 0
WHEEZING: 0
COUGH: 0
ABDOMINAL PAIN: 0
WEAKNESS: 0
DIZZINESS: 0
BRUISES/BLEEDS EASILY: 0
LOSS OF CONSCIOUSNESS: 0
DIARRHEA: 0
SPUTUM PRODUCTION: 1
HEADACHES: 0
HEMOPTYSIS: 0

## 2021-06-16 ASSESSMENT — PAIN DESCRIPTION - PAIN TYPE
TYPE: ACUTE PAIN

## 2021-06-16 NOTE — PROGRESS NOTES
Hospital Medicine Daily Progress Note    Date of Service  6/16/2021    Chief Complaint  50 y.o. female admitted 4/16/2021 with altered mental status    Hospital Course  This is a 50-year-old female with a past medical history of pseudotumor cerebri s/p  shunt implantation by Dr. 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  [facility] infectious disease is managing antibiotics for the patient.  Dr. Candelaria surgery removed the port on 4/12.  The patient was further identified to have endocarditis of the tricuspid valve, a lumbar puncture performed on 4/14 showed pleocytosis with negative Gram stain.  The patient suffered worsening leukocytosis and was found to have septic emboli per CT.  Initially a small pleural catheter was placed.  Significant loculations were encountered.  MSSA empyema was diagnosed.  Neurosurgery was consulted to comment on possibly removal of a  shunt, this was felt not appropriate at this time.  The patient remained on mechanical ventilation for 15 days, then a perc trach was placed.  The patient was eventually liberated from mechanical ventilation, the patient did have a right thoracoscopy and decortication of the lung performed on 4/28 by Dr. Ganser.  The patient was of identified to have multiple sources for MSSA including mitral valve, tricuspid valve vegetation, port, spinal stimulator,  shunt possibly.  Back to the OR for VAT and decortication on 5/17  Trach de-canulated 6/8       Interval Problem Update    ID Consult, Pulmonology and surgeon consult appreciated;   IR for draining tube placed with 10 cc bloody  fluid draining out overnight, roughly 16-18ml in the bottle, sent to lab for analysis, low likelihood of infection vs high likelihood of post surgical hemothorax.     Pending pulmonary and ID input    On ancef  Afebrile  No other complaints    Pain management after procedure.    Consultants/Specialty  ID  Pulmonary  surgeon    Code Status  Full Code    Disposition  Abx's stop date 6/14  Plan repeat CT chest prior to completion of Abx's    Review of Systems  Review of Systems   Constitutional: Negative for chills and fever.   HENT: Negative for sore throat.    Eyes: Negative for blurred vision and double vision.   Respiratory: Negative for cough and shortness of breath.    Cardiovascular: Negative for chest pain, palpitations and leg swelling.   Gastrointestinal: Negative for abdominal pain, diarrhea, nausea and vomiting.   Genitourinary: Negative for dysuria and urgency.   Musculoskeletal: Negative for back pain.   Skin: Negative for rash.   Neurological: Negative for dizziness, loss of consciousness and headaches.        Physical Exam  Temp:  [35.9 °C (96.6 °F)-37 °C (98.6 °F)] 36.1 °C (97 °F)  Pulse:  [68-95] 68  Resp:  [14-18] 16  BP: ()/(50-65) 101/59  SpO2:  [94 %-98 %] 95 %    Physical Exam  Vitals reviewed.   Constitutional:       General: She is not in acute distress.     Appearance: Normal appearance. She is well-developed. She is not diaphoretic.   HENT:      Head: Normocephalic and atraumatic.   Eyes:      General: No scleral icterus.        Right eye: No discharge.         Left eye: No discharge.   Neck:      Vascular: No JVD.   Cardiovascular:      Rate and Rhythm: Normal rate and regular rhythm.      Heart sounds: Murmur heard.     Pulmonary:      Effort: Pulmonary effort is normal. No respiratory distress.      Breath sounds: No stridor. No wheezing or rales.   Abdominal:      Palpations: Abdomen is soft.   Musculoskeletal:         General: No tenderness.      Right lower leg: No edema.       Left lower leg: No edema.      Comments: Right side chest draining tube   Skin:     General: Skin is warm and dry.      Findings: No rash (butt).   Neurological:      General: No focal deficit present.      Mental Status: She is alert and oriented to person, place, and time.   Psychiatric:         Mood and Affect: Mood normal.         Thought Content: Thought content normal.         Fluids    Intake/Output Summary (Last 24 hours) at 6/16/2021 0808  Last data filed at 6/16/2021 0547  Gross per 24 hour   Intake 340 ml   Output 10 ml   Net 330 ml       Laboratory  Recent Labs     06/14/21  0055 06/15/21  0315 06/16/21  0320   WBC 4.5* 4.2* 4.2*   RBC 4.20 3.61* 3.82*   HEMOGLOBIN 11.8* 10.1* 10.7*   HEMATOCRIT 37.6 32.3* 34.2*   MCV 89.5 89.5 89.5   MCH 28.1 28.0 28.0   MCHC 31.4* 31.3* 31.3*   RDW 48.2 48.4 48.7   PLATELETCT 359 335 399   MPV 9.5 9.7 9.8     Recent Labs     06/14/21  0055 06/15/21  0315 06/16/21  0320   SODIUM 127* 132* 132*   POTASSIUM 4.1 3.7 4.0   CHLORIDE 93* 97 96   CO2 26 28 28   GLUCOSE 76 76 78   BUN 7* 5* 6*   CREATININE 0.27* 0.23* 0.24*   CALCIUM 8.9 8.8 8.9                   Imaging  DX-CHEST-PORTABLE (1 VIEW)   Final Result         No significant interval change.      CT-CHEST TUBE-EMPYEMA RIGHT   Final Result      Successful RIGHT chest tube placement.         EC-ECHOCARDIOGRAM COMPLETE W/ CONT   Final Result      CT-CHEST (THORAX) WITH   Final Result      1.  Near complete resolution of multifocal bilateral pulmonary airspace process consistent with pneumonia      2.  Loculated right anterior and anterior/lateral rim-enhancing pleural fluid collection consistent with an empyema. Extent is from just below the lung apex to the diaphragm and the largest component measures 8.0 x 6.0 cm transversely      3.  Ill-defined left upper lobe pulmonary nodule which are most likely postinflammatory      4.  Hepatic steatosis and hepatomegaly                  DX-CHEST-PORTABLE (1 VIEW)   Final Result       1.  Interval removal of the right-sided chest tubes. No pneumothorax is identified.   2.  Airspace opacities in the right are mildly improved and may represent atelectasis/consolidation.   3.  There is likely a small right pleural effusion.   4.  Patchy opacities on the left are mildly improved.   5.  Interval removal of the enteric tube.      DX-CHEST-PORTABLE (1 VIEW)   Final Result         1. Stable lines and tubes.   2. Stable ill-defined bilateral pulmonary opacities, right worse than left. No large pleural effusions.         DX-CHEST-PORTABLE (1 VIEW)   Final Result      1.  Decrease in volume of right pleural fluid collection/empyema with 2 right chest tubes in place. No pneumothorax identified.      2.  No focal consolidation in the left lung.      DX-CHEST-PORTABLE (1 VIEW)   Final Result      No significant interval change.      DX-CHEST-PORTABLE (1 VIEW)   Final Result         1.  Hazy right pulmonary infiltrates and/or effusion, stable compared to prior study.      DX-CHEST-PORTABLE (1 VIEW)   Final Result         1.  Hazy right pulmonary infiltrates and/or effusion, stable compared to prior study.      DX-CHEST-PORTABLE (1 VIEW)   Final Result      1.  All lines and tubes appear appropriately located      2.  Consolidation throughout the right lung consistent with pneumonia      DX-CHEST-PORTABLE (1 VIEW)   Final Result         1.  Hazy right pulmonary infiltrates and/or effusion, increased compared to prior study.      DX-ABDOMEN FOR TUBE PLACEMENT   Final Result      Feeding tube looped in the stomach.      DX-CHEST-PORTABLE (1 VIEW)   Final Result         1. Stable lines and tubes. No pneumothorax detected.   2. Persistent opacities in the right perihilar region and in the right lung periphery, similar to prior study. Left lung is essentially clear.      DX-CHEST-PORTABLE (1 VIEW)   Final Result      Postoperative changes of the right chest with placement of an additional chest tube. There is  improved aeration in the right lung. No significant pleural effusion or definite pneumothorax.      Right IJ central venous catheter tip projects over the proximal right atrium.      Hypoinflation with interstitial and hazy pulmonary opacities.      DX-CHEST-PORTABLE (1 VIEW)   Final Result         1.  Hazy right pulmonary infiltrates and/or effusion, similar compared to prior study.      DX-ABDOMEN FOR TUBE PLACEMENT   Final Result      Feeding tube tip projects over the expected location the gastric antrum.      DX-CHEST-PORTABLE (1 VIEW)   Final Result         1.  Hazy right pulmonary infiltrates and/or effusion, similar compared to prior study.      DX-CHEST-LIMITED (1 VIEW)   Final Result      1.  No pneumothorax. Only one chest tube is now seen in the right lung.   2.  The chest is otherwise stable.      DX-CHEST-PORTABLE (1 VIEW)   Final Result         1.  Hazy right pulmonary infiltrates and/or effusion, similar compared to prior study.      DX-CHEST-PORTABLE (1 VIEW)   Final Result         1.  Hazy right pulmonary infiltrates and/or effusion, similar compared to prior study.      DX-ABDOMEN FOR TUBE PLACEMENT   Final Result         1.  Nonspecific bowel gas pattern.   2.  Dobbhoff tube tip terminates overlying the expected location of the gastric antrum.   3.  Hazy bilateral lung base infiltrates, greater on the right.      DX-CHEST-PORTABLE (1 VIEW)   Final Result         1.  Hazy right pulmonary infiltrates and/or effusion, similar compared to prior study.      US-RUQ   Final Result         1.  Hepatomegaly   2.  Mild intrahepatic and extrahepatic biliary ductal dilatation, commonly associated with postcholecystectomy physiology, consider causes of biliary obstruction with additional workup as clinically appropriate      CT-CHEST,ABDOMEN,PELVIS WITH   Final Result      1.  Large empyema in the RIGHT hemithorax, slightly decreased in size from prior exam with chest tubes present.   2.  Consolidation  remains.   3.  Multiple cavitary lesions again seen in the LEFT upper lobe, minimally decreased from prior, concerning for septic emboli.   4.  Supportive tubing is unchanged.   5.  Intrahepatic and extrahepatic biliary dilation, likely postoperative although distal stricture, stone or mass remain possibilities.   6.  Moderate pelvic fluid, nonspecific.   7.  No evidence of bowel obstruction or perforation.      DX-CHEST-PORTABLE (1 VIEW)   Final Result         1.  Hazy right pulmonary infiltrates and/or effusion, similar compared to prior study.      DX-CHEST-PORTABLE (1 VIEW)   Final Result         No significant interval change.            DX-CHEST-PORTABLE (1 VIEW)   Final Result         Interval removal of left central venous catheter. Interval adjustment of right PICC with tip in the SVC.         DX-ABDOMEN FOR TUBE PLACEMENT   Final Result      Feeding tube tip at the mid to distal stomach.      IR-PICC LINE PLACEMENT W/ GUIDANCE > AGE 5   Final Result                  Ultrasound-guided PICC placement performed by qualified nursing staff as    above.          DX-CHEST-PORTABLE (1 VIEW)   Final Result      1.  Stable cardiopulmonary findings.   2.  See comments above regarding the right-sided PICC position.      DX-CHEST-PORTABLE (1 VIEW)   Final Result      Stable chest findings compared to 5/5.      DX-CHEST-PORTABLE (1 VIEW)   Final Result      Stable chest findings compared with 5/3.      DX-ABDOMEN FOR TUBE PLACEMENT   Final Result         Feeding tube with tip projecting over the expected area of the stomach.      DX-CHEST-PORTABLE (1 VIEW)   Final Result      Stable chest findings compared with prior.      DX-CHEST-PORTABLE (1 VIEW)   Final Result         1. No significant interval change.      US-EXTREMITY ARTERY LOWER UNILAT RIGHT   Final Result      DX-CHEST-PORTABLE (1 VIEW)   Final Result         1. No significant interval change.      IR-PICC LINE PLACEMENT W/ GUIDANCE > AGE 5   Final Result                   Ultrasound-guided PICC placement performed by qualified nursing staff as    above.          DX-CHEST-PORTABLE (1 VIEW)   Final Result         1.  Pulmonary edema and/or infiltrates, similar to prior study.   2.  Stable opacification right lung, likely effusion. Thoracostomy tubes are in place.   3.  Multiple cavitary appearing left pulmonary lesions, see CT performed April 27, 2021 for further characterization      DX-CHEST-PORTABLE (1 VIEW)   Final Result         1.  Pulmonary edema and/or infiltrates, similar to prior study.   2.  Single opacification right lung, likely effusion. Thoracostomy tubes are in place.   3.  Multiple cavitary appearing left pulmonary lesions, see CT performed April 27, 2021 for further characterization   4.  Soft tissue gas in the right chest wall      DX-CHEST-PORTABLE (1 VIEW)   Final Result         1.  Pulmonary edema and/or infiltrates, similar to prior study.   2.  Increased opacification right lung, likely increased effusion. Thoracostomy tubes are in place.   3.  Multiple cavitary appearing left pulmonary lesions, see CT performed April 27, 2021 for further characterization   4.  Soft tissue gas in the right chest wall      DX-CHEST-PORTABLE (1 VIEW)   Final Result         1.  Pulmonary edema and/or infiltrates, similar to prior study.   2.  Right pneumothorax, stable compared to prior study, right thoracostomy tubes remain in place   3.  Multiple cavitary appearing left pulmonary lesions, see CT performed April 27, 2021 for further characterization   4.  Soft tissue gas in the right chest wall      DX-CHEST-PORTABLE (1 VIEW)   Final Result         1.  Pulmonary edema and/or infiltrates, similar to prior study.   2.  Right pneumothorax, interval decreased compared to prior study, interval placement of right thoracostomy tubes is noted.   3.  Multiple cavitary appearing left pulmonary lesions, see CT performed yesterday for further characterization   4.  Soft tissue gas  in the right chest wall      DX-CHEST-PORTABLE (1 VIEW)   Final Result         1.  Pulmonary edema and/or infiltrates, similar to prior study.   2.  Right pneumothorax, stable compared to prior study, interval removal of right thoracostomy tube is noted.   3.  Multiple cavitary appearing left pulmonary lesions, see CT performed yesterday for further characterization   4.  Soft tissue gas in the right chest wall      DX-CHEST-LIMITED (1 VIEW)   Final Result         No significant interval change.      CT-CTA CHEST PULMONARY ARTERY W/ RECONS   Final Result         No CT evidence of pulmonary embolus.      Previous right chest tube were removed.      Grossly similar in size of the loculated right hydropneumothorax but there is increased layering fluid component. Unchanged mild shift of the mediastinal to the left.      Redemonstration of a pigtail catheter in the medial left lung base. Grossly similar multiple cavitary lesions in the left lung.      US-EXTREMITY VENOUS LOWER BILAT   Final Result      POCT BUTTERFLY-DUPLEX SCAN EXTREMITY VEINS LIMITED   Final Result      DX-CHEST-PORTABLE (1 VIEW)   Final Result         1.  Pulmonary edema and/or infiltrates, similar to prior study.   2.  Right pneumothorax, somewhat decreased to prior study, interval removal of right thoracostomy tube is noted.   3.  Multiple cavitary appearing left pulmonary lesions, see CT performed yesterday for further characterization   4.  Soft tissue gas in the right chest wall      DX-CHEST-PORTABLE (1 VIEW)   Final Result         1.  Pulmonary edema and/or infiltrates, similar to prior study.   2.  Right pneumothorax, similar to prior study with thoracostomy tube in place.   3.  Multiple cavitary appearing left pulmonary lesions, see CT performed yesterday for further characterization      CT-CHEST (THORAX) W/O   Final Result      Large loculated right hydropneumothorax with interval increase in size of the pneumothorax and pleural fluid.       Right chest tube is in the posterior right thorax.      There is mediastinal shift to the left compatible with tension.      Small pericardial effusion.      Small loculated left pleural effusion with overlying atelectasis/consolidation.   Pigtail catheter is seen at the medial left lung base. Pleural effusion has decreased in size compared to prior.      There are multiple foci of air extending from the right lung to the pleural space suspicious for a bronchopleural fistula.      Multiple cavitary lesions bilaterally with mild interval decrease of the solid component of the cavitary nodules.      Noncavitary 3.1 cm masslike density is seen at the left lung base.      Findings may be related to septic emboli, malignancy or multifocal abscesses. Short interval follow-up recommended.      Soft tissue air on the right is again seen.      Splenomegaly      Findings discussed with Leti Sun.      DX-CHEST-PORTABLE (1 VIEW)   Final Result      No significant change from prior exam.      CT-HEAD W/O   Final Result      1.  RIGHT frontal ventriculostomy catheter unchanged in position.   2.  Mild cortical atrophy.   3.  No intracranial hemorrhage.   4.  Apparent dural thickening overlying the clivus.  Consider further evaluation with contrast-enhanced MRI.      DX-CHEST-LIMITED (1 VIEW)   Final Result      Interval left basilar pigtail catheter with diminished atelectasis, pleural fluid      Stable right hydropneumothorax with thoracostomy tube in place, considerable soft tissue gas and partial lung collapse      IR-CHEST TUBE-EMPYEMA LEFT   Final Result      1.  CT GUIDED LEFT  10 [place] PIGTAIL CHEST TUBE PLACEMENT FOR POSSIBLE EMPYEMA YIELDING OLD BLOODY FLUID.      2. A CHEST RADIOGRAPH IS FORTHCOMING.      EC-MICAH W/O CONT   Final Result      US-ABDOMEN LTD (SOFT TISSUE)   Final Result      No fluid seen surrounding the electronic implanted spinal stimulator device.      DX-CHEST-LIMITED (1 VIEW)   Final Result       1.  Large right hydropneumothorax with right-sided chest tube in place, likely stable to slightly decreased from prior study.   2.  Small left pleural effusion. Mild improved aeration in the left lung.   3.  Bilateral pulmonary opacities which could be related to combination of atelectasis, edema and/or infection..      CT-CTA HEAD WITH & W/O-POST PROCESS   Final Result      1.  Patent carotid and vertebral arteries.      2.  Again seen right frontal the jugular peritoneal shunt catheter. There is mild increase in volume of the lateral and third ventricles.      CT-CHEST (THORAX) W/O   Final Result      1.  Interval placement of a right-sided chest tube into a large loculated right-sided pleural effusion. There is now seen a large right-sided hydropneumothorax in the area that previously seen fluid. The chest tube extends into that hydropneumothorax.    There is some residual pleural fluid seen as well. A hydropneumothorax is somewhat loculated with components most prominently inferiorly and medially.      2.  New loculated left pleural effusion.      3.  Again seen multiple bilateral cavitary pulmonary masses which are more prominent than previously seen with increasing cavitation and measure up to 3 cm size. Differential diagnosis includes septic emboli, multifocal abscesses and neoplasm.      4.  Large amount of subcutaneous emphysema on the right.      5.  Splenomegaly.      6.  Multiple support devices.      Fleischner Society pulmonary nodule recommendations:         DX-CHEST-LIMITED (1 VIEW)   Final Result      1.  Support devices as described above.      2.  Right chest tube present with a again seen right-sided pneumothorax, possibly increased in size and loculated.      3.  Worsening bilateral pulmonary infiltrates.      DX-ABDOMEN FOR TUBE PLACEMENT   Final Result      Cortrak feeding tube tip projects in the region of the duodenal bulb.      DX-CHEST-LIMITED (1 VIEW)   Final Result      Loculated right  pneumothorax is decreased in size compared to prior.      Small left pleural effusion.      Small loculated right pleural effusion.      Extensive bilateral airspace opacities are not significantly changed.      Soft tissue air on the right is again noted.      DX-CHEST-LIMITED (1 VIEW)   Final Result      Decreased partially loculated right pleural fluid with increased pleural air. Right chest tube is in place.      Increased hazy opacity in the left lung possibly atelectasis.         US-ABDOMEN LTD (SOFT TISSUE)   Final Result      No drainable fluid collection.      DX-CHEST-PORTABLE (1 VIEW)   Final Result      Decreased partially loculated right pleural fluid with increased pleural air. Right chest tube is in place.      No other significant change.      DX-CHEST-PORTABLE (1 VIEW)   Final Result         1.  Pulmonary edema and/or infiltrates are identified, which are somewhat decreased since the prior exam.   2.  Moderate loculated appearing right pleural effusion, slightly decreased since prior      DX-ABDOMEN FOR TUBE PLACEMENT   Final Result      Feeding tube tip at the distal stomach.      DX-CHEST-PORTABLE (1 VIEW)   Final Result         1.  New chest tube is noted in the right lower hemithorax.      2.  Right pleural effusion is again identified which appears similar to the prior exam.      3.  No new infiltrates or consolidations are identified.      DX-CHEST-PORTABLE (1 VIEW)   Final Result         1.  Pulmonary edema and/or infiltrates are identified, which are stable since the prior exam.   2.  Moderate loculated appearing right pleural effusion      DX-CHEST-PORTABLE (1 VIEW)   Final Result         No significant interval change.      POCT BUTTERFLY-ECHOCARDIOGRAM LTD W/O CONT    (Results Pending)   DX-CHEST-PORTABLE (1 VIEW)    (Results Pending)        Assessment/Plan  * Acute bacterial endocarditis- (present on admission)  Assessment & Plan  Mitral valve, tricuspid valve  MSSA  Continue Ancef  through 6/14/2021    Pulmonary abscess (HCC)  Assessment & Plan  From septic emboli  Now s/p decortication and thoracotomy     IR for draining tube placed with 10 cc bloody fluid draining out, sent to lab for analysis, low likelihood of infection vs high likelihood of post surgical hemothorax.  Resume eliquis after    Vaginal itching  Assessment & Plan  Topical Chlortrimazole    Debility  Assessment & Plan  Acute on chronic  Recovering well: may need rehab vs DC with HH if she cont's to do well    Elevated alkaline phosphatase level- (present on admission)  Assessment & Plan  The patient not able to tolerate a MRCP at this time, ultrasound grossly unremarkable, observe    Agitation requiring sedation protocol- (present on admission)  Assessment & Plan  resolved    Goals of care, counseling/discussion  Assessment & Plan  Patient with overall high risk of morbidity and mortality given her gravely ill state and prior chronic medical conditions  Palliative care following    Spinal cord stimulator status- (present on admission)  Assessment & Plan  Patient's stimulator is [employer]. It is FDA approved as MRI compatible, but the company has gone out of business, so there is no support team to assist with MRI.  Thus, if MRI were performed, our radiologists would need to protocol it correctly to manage the stimulator. The former rep from [employer] is Andre, [phone].  Information obtained from Dr. Mahoney's office, [phone].     Per  (5/4/2021), it is not MRI compatible unfortunately.     Thrombocytosis (HCC)  Assessment & Plan  Acute reaction since resolved    Anasarca- (present on admission)  Assessment & Plan  Improved    Pleural effusion, left  Assessment & Plan  resolved    Trapped lung  Assessment & Plan  Now s/p decortication   Chest tubes removed on 5/26, observe    Fever  Assessment & Plan  Resolved, monitor, antibiotic therapy    Pneumonia  Assessment & Plan  MSSA  Ancef per ID through 6/14  Repeat CT  Chest showing some residual empyema: have DW ID and will plan to tap.  Prefer not to go back to the OR given the pt's prolonged and complex hospital course    History of vasculitis- (present on admission)  Assessment & Plan  Does not appear to be a current issue    Bacteremia due to methicillin susceptible Staphylococcus aureus (MSSA)- (present on admission)  Assessment & Plan  Ancef through 6/14    CSF pleocytosis- (present on admission)  Assessment & Plan  Infectious disease managing    Empyema of right pleural space (HCC)- (present on admission)  Assessment & Plan  Patient s/p multiple chest tubes, thoracotomy and decortication  Now s/p second decortication 5/17  Per thoracic surgery note, sutures to be removed on 6/9  Plan Abx's through 6/14     IR for draining tube placed with 10 cc bloody fluid draining out, sent to lab for analysis, low likelihood of infection vs high likelihood of post surgical hemothorax.     Acute blood loss anemia  Assessment & Plan  Likely secondary to hemothorax  300 cc of blood removed after chest tube placed at [facility]  Hb has been stable around 9-10    Prolonged Q-T interval on ECG- (present on admission)  Assessment & Plan  Resolved after correcting metabolic issues  Cont to follow   Monitor electrolytes, acidosis etc    Acute respiratory failure with hypoxia (HCC)- (present on admission)  Assessment & Plan  Secondary to MSSA bacteremia, empyema, pulmonary septic emboli  resolved    History of complicated migraines- (present on admission)  Assessment & Plan  Monitor    GERD (gastroesophageal reflux disease)- (present on admission)  Assessment & Plan  History of, as needed treatment    Hyponatremia  Assessment & Plan  Mild - asymptomatic  Monitor    Tachycardia  Assessment & Plan  Persistent, likely secondary with multiple underlying ongoing conditions  Monitor  Slowly trending down; now 80-90s    Pseudotumor cerebri  Assessment & Plan  History of  shunt, neurosurgery opted  against removal    H/O: CVA (cerebrovascular accident)- (present on admission)  Assessment & Plan  Monitor    Chronic pain syndrome- (present on admission)  Assessment & Plan  Pre-existing, pain management    Port or reservoir infection  Assessment & Plan  S/p removal    Thrombocytopenia (HCC)- (present on admission)  Assessment & Plan  Transient, resolved    Anemia- (present on admission)  Assessment & Plan  Monitor, transfuse as indicated    Septic shock (HCC)- (present on admission)  Assessment & Plan  Sepsis is resolved    Sepsis (HCC)  Assessment & Plan  Sepsis has resolved    Hypomagnesemia  Assessment & Plan  Repeat lab tomorrow    Hypokalemia  Assessment & Plan  Monitor, replace and recheck    Anxiety- (present on admission)  Assessment & Plan  Continue Cymbalta and Paxil  As needed Valium    S/P  shunt- (present on admission)  Assessment & Plan  History of, Dr. Oh felt there is currently no need to remove    Folliculitis  Assessment & Plan  Improved    Acute encephalopathy- (present on admission)  Assessment & Plan  Per neurosurgery no need to remove  shunt  Improved         VTE prophylaxis: enoxaparin on hold for IR drain  scd

## 2021-06-16 NOTE — CARE PLAN
The patient is Watcher - Medium risk of patient condition declining or worsening    Shift Goals  Clinical Goals: Manage pain  Patient Goals: Pain   Family Goals: NA    Progress made toward(s) clinical / shift goals:  New chest tube pain managed with oxycodone.    Patient is not progressing towards the following goals:

## 2021-06-16 NOTE — PROGRESS NOTES
Assessment/description of ears? Pink, blanching, intact  Which preventative measures are in place for the ears?  Grey foam on O2 tubing     Assessment/description of elbows? Pink, blanching, intact  Which preventative measures are in place for the elbows?  Pt ambulates, repositions frequently     Assessment/description of sacrum? Pink/red, excoriated, intact  Which preventative measures are in place for the sacrum?  Pt ambulates, repositions frequently. Barrier cream applied     Assessment/description of heels? Dry, pink, intact  Which preventative measures are in place for the heels?  Pt ambulates, repositions frequently     Which devices are in place? PICC, NC  Description of skin under devices:Pink, intact  Which preventative measures are in place under devices?     Other: R great toe, L third toe have pressure injury (open to air)  Bilateral crease btw upper thigh and pelvis excoriated, red-barrier cream applied.  Chest drain, upper anterior chest wall, bandage covering insertion site.

## 2021-06-16 NOTE — PROGRESS NOTES
Pharmacy Pharmacotherapy Consult for LOS >30 days    Admit Date: 4/16/2021      Medications were reviewed for appropriateness and ongoing need.     Current Facility-Administered Medications   Medication Dose Route Frequency Provider Last Rate Last Admin   • morphine (pf) 4 mg/mL injection 2 mg  2 mg Intravenous Q2HRS PRN Raphael Chaudhry M.D.       • diphenhydrAMINE (BENADRYL) tablet/capsule 25 mg  25 mg Oral Q6HRS PRN Raphael Chaudhry M.D.       • ceFAZolin in dextrose (ANCEF) IVPB premix 2 g  2 g Intravenous Q8HRS Marlon Cox D.O.   Stopped at 06/16/21 0547   • diphenhydrAMINE (BENADRYL) injection 50 mg  50 mg Intravenous Q6HRS PRN Pascual Augustine M.D.   50 mg at 06/16/21 1043   • acetaminophen (TYLENOL) tablet 500 mg  500 mg Oral TID Bill Marcum M.D.   500 mg at 06/16/21 1004   • divalproex (DEPAKOTE SPRINKLE) capsule 500 mg  500 mg Oral Q8HRS Bill Marcum M.D.   500 mg at 06/16/21 0411   • DULoxetine (CYMBALTA) capsule 60 mg  60 mg Oral BID Bill Marcum M.D.   60 mg at 06/16/21 0411   • gabapentin (NEURONTIN) capsule 300 mg  300 mg Oral Q8HRS Bill Marcum M.D.   300 mg at 06/16/21 0411   • methadone (DOLOPHINE) tablet 30 mg  30 mg Oral DAILY Bill Marcum M.D.   30 mg at 06/16/21 0516   • PARoxetine (PAXIL) tablet 10 mg  10 mg Oral QAM Bill Marcum M.D.   10 mg at 06/16/21 0411   • risperiDONE (RISPERDAL) tablet 2 mg  2 mg Oral BID Bill Marcum M.D.   2 mg at 06/16/21 0411   • senna-docusate (PERICOLACE or SENOKOT S) 8.6-50 MG per tablet 2 tablet  2 tablet Oral BID Bill Marcum M.D.   2 tablet at 06/16/21 0411    And   • polyethylene glycol/lytes (MIRALAX) PACKET 1 Packet  1 Packet Oral QDAY PRN Bill Marcum M.D.        And   • magnesium hydroxide (MILK OF MAGNESIA) suspension 30 mL  30 mL Oral QDAY PRELISE Marcum M.D.        And   • bisacodyl (DULCOLAX) suppository 10 mg  10 mg Rectal QDAY PRELISE Marcum M.D.       •  acetaminophen (Tylenol) tablet 650 mg  650 mg Oral Q4HRS PRN Bill Marcum M.D.   650 mg at 05/30/21 0457   • diazePAM (VALIUM) tablet 5 mg  5 mg Oral QHS PRN Bill Marcum M.D.   5 mg at 06/14/21 2122   • oxyCODONE immediate-release (ROXICODONE) tablet 5 mg  5 mg Oral Q4HRS PRN Bill Marcum M.D.   5 mg at 06/16/21 1002   • enoxaparin (LOVENOX) inj 40 mg  40 mg Subcutaneous DAILY Juventino Lozano M.D.   40 mg at 06/16/21 0414   • Respiratory Therapy Consult   Nebulization Continuous RT Juventino Lozano M.D.       • ondansetron (ZOFRAN) syringe/vial injection 4 mg  4 mg Intravenous Q4HRS PRN Juventino Lozano M.D.   4 mg at 06/13/21 1349   • labetalol (NORMODYNE/TRANDATE) injection 10 mg  10 mg Intravenous Q4HRS PRN Juventino Lozano M.D.   10 mg at 05/04/21 0226       Recommendations:    -discontinue acetaminophen due to lack of use and duplicate therapy  -    ***

## 2021-06-16 NOTE — PROGRESS NOTES
Radiology Progress Note   Author: PATY Bland Date & Time created: 6/16/2021  9:33 AM   Date of admission  4/16/2021    Chief Complaint  50 y.o. female admitted 4/16/2021 with  altered mental status    Interval Problem Update   IR  6/15- Right Pleural abscess drain placement 12 fr, sample sent to lab       Past Medical History:   Diagnosis Date   • Allergy, unspecified not elsewhere classified    • Anemia 10/05/2017    Unsure if a current issue.   • Anesthesia     Migrane after anesthesia   • Anxiety    • Anxiety, generalized    • Arthritis    • autoimmune    • Autoimmune disorder (HCC)     Unknown etiology or type   • Back pain    • Clostridium difficile diarrhea 2014    follow up tests negative   • Depression    • Elevated liver enzymes 2017    Related to meds.   • Fall    • H/O Clostridium difficile infection 2014   • Hx MRSA infection 2003    with neurostimulator implant   • Hydrocephalus (HCC) 2015    with lumbar shunt   • Hyponatremia 9/28/2019   • Joint pain 09/10/2019    Especially right shoulder   • Migraine with aura, with intractable migraine, so stated, without mention of status migrainosus     from undefined autoimmune issue   • MRSA (methicillin resistant Staphylococcus aureus) 08/2015    to lumbar shunt and power port   • MRSA (methicillin resistant Staphylococcus aureus) 12/2017    left foot autoimmune decay   • MRSA (methicillin resistant Staphylococcus aureus) 2018    to neurostimulator.    • Pituitary abnormality (HCC) 2002   • Port or reservoir infection 10/3/2015   • Requires supplemental oxygen     to treat migraines. 2-5L/NC   • Seizure (HCC) started 2017    Unknown etiology-not sure if related to autoimmune issue. Last seizure 2/2019   • Sepsis (HCC) 8/30/2015   • Staph infection    • Stroke (HCC) 2007     with right side residual weakness       Past Surgical History:   Procedure Laterality Date   • THORACOTOMY Right 5/17/2021    Procedure: THORACOTOMY;  Surgeon: John H Ganser,  M.D.;  Location: [facility];  Service: General   • DECORTICATION  2021    Procedure: DECORTICATION, LUNG.;  Surgeon: John H Ganser, M.D.;  Location: [facility];  Service: General   • PB THORACOSCOPY,DX NO BX Right 2021    Procedure: THORACOSCOPY;  Surgeon: John H Ganser, M.D.;  Location: [facility];  Service: General   • DECORTICATION Right 2021    Procedure: DECORTICATION, LUNG;  Surgeon: John H Ganser, M.D.;  Location: [facility];  Service: General   • OTHER Left 2020    Port placement left chest   • PB IMPLANT NEUROSTIM/  2019    Procedure: INSERTION, NEUROSTIMULATOR, PERMANENT, SPINAL CORD;  Surgeon: Seven Mahoney M.D.;  Location: [facility];  Service: Pain Management   • SPINAL CORD STIMULATOR  2018    Explanted   • FULL THICKNESS SKIN GRAFT Left 2018     graft to foot (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   • OTHER SURGICAL PROCEDURE  11/10/2017    Power port   • LIVER BIOPSY  2017   •  SHUNT INSERTION  2017    Procedure:  SHUNT INSERTION;  Surgeon: Drew Berry M.D.;  Location: [facility];  Service:    • SPINAL CORD STIMULATOR  2016    Procedure: SPINAL CORD STIMULATOR FOR: PLACEMENT OF LEFT FLANK OCCIPITAL NERVE STIMULATOR BATTERY PACK;  Surgeon: Oli Oh III, M.D.;  Location: [facility];  Service:    • LUMBAR EXPLORATION N/A 2015    Procedure: LUMBAR EXPLORATION- Lumbar shunt removal ;  Surgeon: Oli Oh III, M.D.;  Location: [facility];  Service:    • OTHER NEUROLOGICAL SURG  2015    Explanted lumbar shunt and explanted power port due to MRSA   • IRRIGATION & DEBRIDEMENT NEURO N/A 2015    Procedure: IRRIGATION & DEBRIDEMENT NEURO;  Surgeon: Oli Oh III, M.D.;  Location: [facility];  Service:    • SPINAL CORD STIMULATOR      1st implant   • CHOLECYSTECTOMY      had ruptured  day before   • ENDOMETRIAL ABLATION  2001   • TUBAL COAGULATION LAPAROSCOPIC BILATERAL Bilateral 2001   • KNEE ARTHROSCOPY Right 1990   • TONSILLECTOMY  1985   • APPENDECTOMY  1982   • OTHER NEUROLOGICAL SURG  6091-9490    occipital nerve stimulator-revisions for total of 9 procedures       Current Facility-Administered Medications   Medication Dose Route Frequency Provider Last Rate Last Admin   • morphine (pf) 4 mg/mL injection 2 mg  2 mg Intravenous Q2HRS PRN Raphael Chaudhry M.D.       • diphenhydrAMINE (BENADRYL) tablet/capsule 25 mg  25 mg Oral Q6HRS PRN Raphael Chaudhry M.D.       • ceFAZolin in dextrose (ANCEF) IVPB premix 2 g  2 g Intravenous Q8HRS Marlon Cox D.CADY   Stopped at 06/16/21 0547   • diphenhydrAMINE (BENADRYL) injection 50 mg  50 mg Intravenous Q6HRS PRN Pascual Augustine M.D.   50 mg at 06/16/21 0412   • acetaminophen (TYLENOL) tablet 500 mg  500 mg Oral TID Bill Marcum M.D.   500 mg at 06/15/21 2141   • divalproex (DEPAKOTE SPRINKLE) capsule 500 mg  500 mg Oral Q8HRS Bill Marcum M.D.   500 mg at 06/16/21 0411   • DULoxetine (CYMBALTA) capsule 60 mg  60 mg Oral BID Bill Marcum M.D.   60 mg at 06/16/21 0411   • gabapentin (NEURONTIN) capsule 300 mg  300 mg Oral Q8HRS Bill Marcum M.D.   300 mg at 06/16/21 0411   • methadone (DOLOPHINE) tablet 30 mg  30 mg Oral DAILY Bill Marcum M.D.   30 mg at 06/16/21 0516   • PARoxetine (PAXIL) tablet 10 mg  10 mg Oral QAM Bill Marcum M.D.   10 mg at 06/16/21 0411   • risperiDONE (RISPERDAL) tablet 2 mg  2 mg Oral BID Bill Marcum M.D.   2 mg at 06/16/21 0411   • senna-docusate (PERICOLACE or SENOKOT S) 8.6-50 MG per tablet 2 tablet  2 tablet Oral BID Bill Marcum M.D.   2 tablet at 06/16/21 0411    And   • polyethylene glycol/lytes (MIRALAX) PACKET 1 Packet  1 Packet Oral QDAY PRN Bill Marcum M.D.        And   • magnesium hydroxide (MILK OF MAGNESIA) suspension 30 mL  30 mL Oral  QDAY PRELISE Marcum M.D.        And   • bisacodyl (DULCOLAX) suppository 10 mg  10 mg Rectal QDAY PRELISE Marcum M.D.       • acetaminophen (Tylenol) tablet 650 mg  650 mg Oral Q4HRS PRN Bill Marcum M.D.   650 mg at 05/30/21 0457   • diazePAM (VALIUM) tablet 5 mg  5 mg Oral QHS PRELISE Marcum M.D.   5 mg at 06/14/21 2122   • oxyCODONE immediate-release (ROXICODONE) tablet 5 mg  5 mg Oral Q4HRS PRELISE Marcum M.D.   5 mg at 06/16/21 0411   • enoxaparin (LOVENOX) inj 40 mg  40 mg Subcutaneous DAILY Juventino Lozano M.D.   40 mg at 06/16/21 0414   • Respiratory Therapy Consult   Nebulization Continuous RT Juventino Lozano M.D.       • ondansetron (ZOFRAN) syringe/vial injection 4 mg  4 mg Intravenous Q4HRS PRN Juventino Lozano M.D.   4 mg at 06/13/21 1349   • labetalol (NORMODYNE/TRANDATE) injection 10 mg  10 mg Intravenous Q4HRS PRN Juevntino Lozano M.D.   10 mg at 05/04/21 0226       Social History     Socioeconomic History   • Marital status:      Spouse name: Not on file   • Number of children: Not on file   • Years of education: Not on file   • Highest education level: Not on file   Occupational History   • Occupation:      Comment: on disability   Tobacco Use   • Smoking status: Never Smoker   • Smokeless tobacco: Never Used   Vaping Use   • Vaping Use: Never used   Substance and Sexual Activity   • Alcohol use: Not Currently   • Drug use: Never   • Sexual activity: Not on file   Other Topics Concern   • Not on file   Social History Narrative   • Not on file     Social Determinants of Health     Financial Resource Strain:    • Difficulty of Paying Living Expenses:    Food Insecurity:    • Worried About Running Out of Food in the Last Year:    • Ran Out of Food in the Last Year:    Transportation Needs:    • Lack of Transportation (Medical):    • Lack of Transportation (Non-Medical):    Physical Activity:    • Days of Exercise per Week:     • Minutes of Exercise per Session:    Stress:    • Feeling of Stress :    Social Connections:    • Frequency of Communication with Friends and Family:    • Frequency of Social Gatherings with Friends and Family:    • Attends Muslim Services:    • Active Member of Clubs or Organizations:    • Attends Club or Organization Meetings:    • Marital Status:    Intimate Partner Violence:    • Fear of Current or Ex-Partner:    • Emotionally Abused:    • Physically Abused:    • Sexually Abused:        Family History   Problem Relation Age of Onset   • Mult Sclerosis Mother    • Diabetes Father    • Alcohol abuse Father        ROS  Review of Systems   Constitutional: Positive for malaise/fatigue. Negative for chills and fever.   HENT: Negative for hearing loss.    Eyes: Negative for blurred vision and pain.   Respiratory: Negative for cough, hemoptysis and shortness of breath.    Cardiovascular: Negative for chest pain and palpitations.   Gastrointestinal: Negative for abdominal pain and vomiting.   Genitourinary: Negative for dysuria.   Musculoskeletal: Negative for back pain.   Skin: Positive for rash.   Neurological: Negative for dizziness, weakness and headaches.   Endo/Heme/Allergies: Does not bruise/bleed easily.   Psychiatric/Behavioral: Negative for depression.        Physical Exam   Vitals reviewed.   Constitutional:       General: She is not in acute distress.     Appearance: Normal appearance. She is well-developed. She is not diaphoretic.   HENT:      Head: Normocephalic and atraumatic.   Eyes:      General: No scleral icterus.        Right eye: No discharge.         Left eye: No discharge.   Neck:      Vascular: No JVD.   Cardiovascular:      Rate and Rhythm: Normal rate and regular rhythm.      Heart sounds: Murmur heard.   Pulmonary:      Effort: Pulmonary effort is normal. No respiratory distress.      Breath sounds: No stridor. No wheezing or rales. No SOB. Diminished Breath sounds RLL, LLL  Abdominal:       Palpations: Abdomen is soft.   Musculoskeletal:         General: No tenderness.      Right lower leg: No edema.      Left lower leg: No edema.      Comments: Right side chest draining tube   Skin:     General: Skin is warm and dry.    Neurological:      General: No focal deficit present.      Mental Status: She is alert and oriented to person, place, and time.   Psychiatric:         Mood and Affect: Mood normal.         Thought Content: Thought content normal.     Vital Signs  Blood Pressure: (!) 91/47   Temperature: 36.2 °C (97.2 °F)   Pulse: 81   Respiration: 14   Pulse Oximetry: 96 %     Laboratory  Recent Labs     06/14/21  0055 06/15/21  0315 06/16/21  0320   WBC 4.5* 4.2* 4.2*   RBC 4.20 3.61* 3.82*   HEMOGLOBIN 11.8* 10.1* 10.7*   HEMATOCRIT 37.6 32.3* 34.2*   MCV 89.5 89.5 89.5   MCH 28.1 28.0 28.0   MCHC 31.4* 31.3* 31.3*   RDW 48.2 48.4 48.7   PLATELETCT 359 335 399   MPV 9.5 9.7 9.8     Recent Labs     06/14/21  0055 06/15/21  0315 06/16/21  0320   SODIUM 127* 132* 132*   POTASSIUM 4.1 3.7 4.0   CHLORIDE 93* 97 96   CO2 26 28 28   GLUCOSE 76 76 78   BUN 7* 5* 6*   CREATININE 0.27* 0.23* 0.24*   CALCIUM 8.9 8.8 8.9         Recent Labs     06/14/21  0055   ASTSGOT 13   ALTSGPT <5   TBILIRUBIN 0.3   ALKPHOSPHAT 293*   GLOBULIN 4.3*     Results     Procedure Component Value Units Date/Time    Fungal Culture [[id number]] Collected: 06/15/21 1005    Order Status: Completed Specimen: Body Fluid Updated: 06/15/21 1819     Significant Indicator NEG     Source BF     Site Pleural Fluid     Culture Result Culture in progress.    GRAM STAIN [[id number]] Collected: 06/15/21 1005    Order Status: Completed Specimen: Body Fluid Updated: 06/15/21 1819     Significant Indicator .     Source BF     Site Pleural Fluid     Gram Stain Result Many WBCs.  No organisms seen.      FLUID CULTURE W/GRAM STAIN [[id number]] Collected: 06/15/21 1005    Order Status: Completed Specimen: Body Fluid Updated: 06/15/21 0384      Significant Indicator NEG     Source BF     Site Pleural Fluid     Culture Result -     Gram Stain Result Many WBCs.  No organisms seen.      FUNGAL CULTURE [[id number]]     Order Status: No result Specimen: Respirate from Other Body Fluid     FLUID CULTURE W/GRAM STAIN [[id number]]     Order Status: No result Specimen: Body Fluid from Pleural Fluid     FLUID CULTURE W/GRAM STAIN [[id number]]     Order Status: No result Specimen: Body Fluid from Thoracentesis Fluid     FUNGAL CULTURE [[id number]]  (Abnormal) Collected: 06/07/21 1248    Order Status: Completed Specimen: Genital from Vaginal Updated: 06/10/21 0827     Significant Indicator POS     Source GEN     Site vaginal     Culture Result -      Yeast  Heavy growth      Narrative:      Collected By:[id number] STUART MELVIN  Collected By:[id number] STUART MELVIN          Imaging  DX-CHEST-PORTABLE (1 VIEW)   Final Result         No significant interval change.      CT-CHEST TUBE-EMPYEMA RIGHT   Final Result      Successful RIGHT chest tube placement.         EC-ECHOCARDIOGRAM COMPLETE W/ CONT   Final Result      CT-CHEST (THORAX) WITH   Final Result      1.  Near complete resolution of multifocal bilateral pulmonary airspace process consistent with pneumonia      2.  Loculated right anterior and anterior/lateral rim-enhancing pleural fluid collection consistent with an empyema. Extent is from just below the lung apex to the diaphragm and the largest component measures 8.0 x 6.0 cm transversely      3.  Ill-defined left upper lobe pulmonary nodule which are most likely postinflammatory      4.  Hepatic steatosis and hepatomegaly                  DX-CHEST-PORTABLE (1 VIEW)   Final Result      1.  Interval removal of the right-sided chest tubes. No pneumothorax is identified.   2.  Airspace opacities in the right are mildly improved and may represent atelectasis/consolidation.   3.  There is likely a small right pleural effusion.   4.  Patchy opacities on the left  are mildly improved.   5.  Interval removal of the enteric tube.      DX-CHEST-PORTABLE (1 VIEW)   Final Result         1. Stable lines and tubes.   2. Stable ill-defined bilateral pulmonary opacities, right worse than left. No large pleural effusions.         DX-CHEST-PORTABLE (1 VIEW)   Final Result      1.  Decrease in volume of right pleural fluid collection/empyema with 2 right chest tubes in place. No pneumothorax identified.      2.  No focal consolidation in the left lung.      DX-CHEST-PORTABLE (1 VIEW)   Final Result      No significant interval change.      DX-CHEST-PORTABLE (1 VIEW)   Final Result         1.  Hazy right pulmonary infiltrates and/or effusion, stable compared to prior study.      DX-CHEST-PORTABLE (1 VIEW)   Final Result         1.  Hazy right pulmonary infiltrates and/or effusion, stable compared to prior study.      DX-CHEST-PORTABLE (1 VIEW)   Final Result      1.  All lines and tubes appear appropriately located      2.  Consolidation throughout the right lung consistent with pneumonia      DX-CHEST-PORTABLE (1 VIEW)   Final Result         1.  Hazy right pulmonary infiltrates and/or effusion, increased compared to prior study.      DX-ABDOMEN FOR TUBE PLACEMENT   Final Result      Feeding tube looped in the stomach.      DX-CHEST-PORTABLE (1 VIEW)   Final Result         1. Stable lines and tubes. No pneumothorax detected.   2. Persistent opacities in the right perihilar region and in the right lung periphery, similar to prior study. Left lung is essentially clear.      DX-CHEST-PORTABLE (1 VIEW)   Final Result      Postoperative changes of the right chest with placement of an additional chest tube. There is improved aeration in the right lung. No significant pleural effusion or definite pneumothorax.      Right IJ central venous catheter tip projects over the proximal right atrium.      Hypoinflation with interstitial and hazy pulmonary opacities.      DX-CHEST-PORTABLE (1 VIEW)    Final Result         1.  Hazy right pulmonary infiltrates and/or effusion, similar compared to prior study.      DX-ABDOMEN FOR TUBE PLACEMENT   Final Result      Feeding tube tip projects over the expected location the gastric antrum.      DX-CHEST-PORTABLE (1 VIEW)   Final Result         1.  Hazy right pulmonary infiltrates and/or effusion, similar compared to prior study.      DX-CHEST-LIMITED (1 VIEW)   Final Result      1.  No pneumothorax. Only one chest tube is now seen in the right lung.   2.  The chest is otherwise stable.      DX-CHEST-PORTABLE (1 VIEW)   Final Result         1.  Hazy right pulmonary infiltrates and/or effusion, similar compared to prior study.      DX-CHEST-PORTABLE (1 VIEW)   Final Result         1.  Hazy right pulmonary infiltrates and/or effusion, similar compared to prior study.      DX-ABDOMEN FOR TUBE PLACEMENT   Final Result         1.  Nonspecific bowel gas pattern.   2.  Dobbhoff tube tip terminates overlying the expected location of the gastric antrum.   3.  Hazy bilateral lung base infiltrates, greater on the right.      DX-CHEST-PORTABLE (1 VIEW)   Final Result         1.  Hazy right pulmonary infiltrates and/or effusion, similar compared to prior study.      US-RUQ   Final Result         1.  Hepatomegaly   2.  Mild intrahepatic and extrahepatic biliary ductal dilatation, commonly associated with postcholecystectomy physiology, consider causes of biliary obstruction with additional workup as clinically appropriate      CT-CHEST,ABDOMEN,PELVIS WITH   Final Result      1.  Large empyema in the RIGHT hemithorax, slightly decreased in size from prior exam with chest tubes present.   2.  Consolidation remains.   3.  Multiple cavitary lesions again seen in the LEFT upper lobe, minimally decreased from prior, concerning for septic emboli.   4.  Supportive tubing is unchanged.   5.  Intrahepatic and extrahepatic biliary dilation, likely postoperative although distal stricture, stone  or mass remain possibilities.   6.  Moderate pelvic fluid, nonspecific.   7.  No evidence of bowel obstruction or perforation.      DX-CHEST-PORTABLE (1 VIEW)   Final Result         1.  Hazy right pulmonary infiltrates and/or effusion, similar compared to prior study.      DX-CHEST-PORTABLE (1 VIEW)   Final Result         No significant interval change.            DX-CHEST-PORTABLE (1 VIEW)   Final Result         Interval removal of left central venous catheter. Interval adjustment of right PICC with tip in the SVC.         DX-ABDOMEN FOR TUBE PLACEMENT   Final Result      Feeding tube tip at the mid to distal stomach.      IR-PICC LINE PLACEMENT W/ GUIDANCE > AGE 5   Final Result                  Ultrasound-guided PICC placement performed by qualified nursing staff as    above.          DX-CHEST-PORTABLE (1 VIEW)   Final Result      1.  Stable cardiopulmonary findings.   2.  See comments above regarding the right-sided PICC position.      DX-CHEST-PORTABLE (1 VIEW)   Final Result      Stable chest findings compared to 5/5.      DX-CHEST-PORTABLE (1 VIEW)   Final Result      Stable chest findings compared with 5/3.      DX-ABDOMEN FOR TUBE PLACEMENT   Final Result         Feeding tube with tip projecting over the expected area of the stomach.      DX-CHEST-PORTABLE (1 VIEW)   Final Result      Stable chest findings compared with prior.      DX-CHEST-PORTABLE (1 VIEW)   Final Result         1. No significant interval change.      US-EXTREMITY ARTERY LOWER UNILAT RIGHT   Final Result      DX-CHEST-PORTABLE (1 VIEW)   Final Result         1. No significant interval change.      IR-PICC LINE PLACEMENT W/ GUIDANCE > AGE 5   Final Result                  Ultrasound-guided PICC placement performed by qualified nursing staff as    above.          DX-CHEST-PORTABLE (1 VIEW)   Final Result         1.  Pulmonary edema and/or infiltrates, similar to prior study.   2.  Stable opacification right lung, likely effusion.  Thoracostomy tubes are in place.   3.  Multiple cavitary appearing left pulmonary lesions, see CT performed April 27, 2021 for further characterization      DX-CHEST-PORTABLE (1 VIEW)   Final Result         1.  Pulmonary edema and/or infiltrates, similar to prior study.   2.  Single opacification right lung, likely effusion. Thoracostomy tubes are in place.   3.  Multiple cavitary appearing left pulmonary lesions, see CT performed April 27, 2021 for further characterization   4.  Soft tissue gas in the right chest wall      DX-CHEST-PORTABLE (1 VIEW)   Final Result         1.  Pulmonary edema and/or infiltrates, similar to prior study.   2.  Increased opacification right lung, likely increased effusion. Thoracostomy tubes are in place.   3.  Multiple cavitary appearing left pulmonary lesions, see CT performed April 27, 2021 for further characterization   4.  Soft tissue gas in the right chest wall      DX-CHEST-PORTABLE (1 VIEW)   Final Result         1.  Pulmonary edema and/or infiltrates, similar to prior study.   2.  Right pneumothorax, stable compared to prior study, right thoracostomy tubes remain in place   3.  Multiple cavitary appearing left pulmonary lesions, see CT performed April 27, 2021 for further characterization   4.  Soft tissue gas in the right chest wall      DX-CHEST-PORTABLE (1 VIEW)   Final Result         1.  Pulmonary edema and/or infiltrates, similar to prior study.   2.  Right pneumothorax, interval decreased compared to prior study, interval placement of right thoracostomy tubes is noted.   3.  Multiple cavitary appearing left pulmonary lesions, see CT performed yesterday for further characterization   4.  Soft tissue gas in the right chest wall      DX-CHEST-PORTABLE (1 VIEW)   Final Result         1.  Pulmonary edema and/or infiltrates, similar to prior study.   2.  Right pneumothorax, stable compared to prior study, interval removal of right thoracostomy tube is noted.   3.  Multiple  cavitary appearing left pulmonary lesions, see CT performed yesterday for further characterization   4.  Soft tissue gas in the right chest wall      DX-CHEST-LIMITED (1 VIEW)   Final Result         No significant interval change.      CT-CTA CHEST PULMONARY ARTERY W/ RECONS   Final Result         No CT evidence of pulmonary embolus.      Previous right chest tube were removed.      Grossly similar in size of the loculated right hydropneumothorax but there is increased layering fluid component. Unchanged mild shift of the mediastinal to the left.      Redemonstration of a pigtail catheter in the medial left lung base. Grossly similar multiple cavitary lesions in the left lung.      US-EXTREMITY VENOUS LOWER BILAT   Final Result      POCT BUTTERFLY-DUPLEX SCAN EXTREMITY VEINS LIMITED   Final Result      DX-CHEST-PORTABLE (1 VIEW)   Final Result         1.  Pulmonary edema and/or infiltrates, similar to prior study.   2.  Right pneumothorax, somewhat decreased to prior study, interval removal of right thoracostomy tube is noted.   3.  Multiple cavitary appearing left pulmonary lesions, see CT performed yesterday for further characterization   4.  Soft tissue gas in the right chest wall      DX-CHEST-PORTABLE (1 VIEW)   Final Result         1.  Pulmonary edema and/or infiltrates, similar to prior study.   2.  Right pneumothorax, similar to prior study with thoracostomy tube in place.   3.  Multiple cavitary appearing left pulmonary lesions, see CT performed yesterday for further characterization      CT-CHEST (THORAX) W/O   Final Result      Large loculated right hydropneumothorax with interval increase in size of the pneumothorax and pleural fluid.      Right chest tube is in the posterior right thorax.      There is mediastinal shift to the left compatible with tension.      Small pericardial effusion.      Small loculated left pleural effusion with overlying atelectasis/consolidation.   Pigtail catheter is seen at  the medial left lung base. Pleural effusion has decreased in size compared to prior.      There are multiple foci of air extending from the right lung to the pleural space suspicious for a bronchopleural fistula.      Multiple cavitary lesions bilaterally with mild interval decrease of the solid component of the cavitary nodules.      Noncavitary 3.1 cm masslike density is seen at the left lung base.      Findings may be related to septic emboli, malignancy or multifocal abscesses. Short interval follow-up recommended.      Soft tissue air on the right is again seen.      Splenomegaly      Findings discussed with Leti Sun.      DX-CHEST-PORTABLE (1 VIEW)   Final Result      No significant change from prior exam.      CT-HEAD W/O   Final Result      1.  RIGHT frontal ventriculostomy catheter unchanged in position.   2.  Mild cortical atrophy.   3.  No intracranial hemorrhage.   4.  Apparent dural thickening overlying the clivus.  Consider further evaluation with contrast-enhanced MRI.      DX-CHEST-LIMITED (1 VIEW)   Final Result      Interval left basilar pigtail catheter with diminished atelectasis, pleural fluid      Stable right hydropneumothorax with thoracostomy tube in place, considerable soft tissue gas and partial lung collapse      IR-CHEST TUBE-EMPYEMA LEFT   Final Result      1.  CT GUIDED LEFT  10 [place] PIGTAIL CHEST TUBE PLACEMENT FOR POSSIBLE EMPYEMA YIELDING OLD BLOODY FLUID.      2. A CHEST RADIOGRAPH IS FORTHCOMING.      EC-MICAH W/O CONT   Final Result      US-ABDOMEN LTD (SOFT TISSUE)   Final Result      No fluid seen surrounding the electronic implanted spinal stimulator device.      DX-CHEST-LIMITED (1 VIEW)   Final Result      1.  Large right hydropneumothorax with right-sided chest tube in place, likely stable to slightly decreased from prior study.   2.  Small left pleural effusion. Mild improved aeration in the left lung.   3.  Bilateral pulmonary opacities which could be related to  combination of atelectasis, edema and/or infection..      CT-CTA HEAD WITH & W/O-POST PROCESS   Final Result      1.  Patent carotid and vertebral arteries.      2.  Again seen right frontal the jugular peritoneal shunt catheter. There is mild increase in volume of the lateral and third ventricles.      CT-CHEST (THORAX) W/O   Final Result      1.  Interval placement of a right-sided chest tube into a large loculated right-sided pleural effusion. There is now seen a large right-sided hydropneumothorax in the area that previously seen fluid. The chest tube extends into that hydropneumothorax.    There is some residual pleural fluid seen as well. A hydropneumothorax is somewhat loculated with components most prominently inferiorly and medially.      2.  New loculated left pleural effusion.      3.  Again seen multiple bilateral cavitary pulmonary masses which are more prominent than previously seen with increasing cavitation and measure up to 3 cm size. Differential diagnosis includes septic emboli, multifocal abscesses and neoplasm.      4.  Large amount of subcutaneous emphysema on the right.      5.  Splenomegaly.      6.  Multiple support devices.      Fleischner Society pulmonary nodule recommendations:         DX-CHEST-LIMITED (1 VIEW)   Final Result      1.  Support devices as described above.      2.  Right chest tube present with a again seen right-sided pneumothorax, possibly increased in size and loculated.      3.  Worsening bilateral pulmonary infiltrates.      DX-ABDOMEN FOR TUBE PLACEMENT   Final Result      Cortrak feeding tube tip projects in the region of the duodenal bulb.      DX-CHEST-LIMITED (1 VIEW)   Final Result      Loculated right pneumothorax is decreased in size compared to prior.      Small left pleural effusion.      Small loculated right pleural effusion.      Extensive bilateral airspace opacities are not significantly changed.      Soft tissue air on the right is again noted.       DX-CHEST-LIMITED (1 VIEW)   Final Result      Decreased partially loculated right pleural fluid with increased pleural air. Right chest tube is in place.      Increased hazy opacity in the left lung possibly atelectasis.         US-ABDOMEN LTD (SOFT TISSUE)   Final Result      No drainable fluid collection.      DX-CHEST-PORTABLE (1 VIEW)   Final Result      Decreased partially loculated right pleural fluid with increased pleural air. Right chest tube is in place.      No other significant change.      DX-CHEST-PORTABLE (1 VIEW)   Final Result         1.  Pulmonary edema and/or infiltrates are identified, which are somewhat decreased since the prior exam.   2.  Moderate loculated appearing right pleural effusion, slightly decreased since prior      DX-ABDOMEN FOR TUBE PLACEMENT   Final Result      Feeding tube tip at the distal stomach.      DX-CHEST-PORTABLE (1 VIEW)   Final Result         1.  New chest tube is noted in the right lower hemithorax.      2.  Right pleural effusion is again identified which appears similar to the prior exam.      3.  No new infiltrates or consolidations are identified.      DX-CHEST-PORTABLE (1 VIEW)   Final Result         1.  Pulmonary edema and/or infiltrates are identified, which are stable since the prior exam.   2.  Moderate loculated appearing right pleural effusion      DX-CHEST-PORTABLE (1 VIEW)   Final Result         No significant interval change.      POCT BUTTERFLY-ECHOCARDIOGRAM LTD W/O CONT    (Results Pending)   DX-CHEST-PORTABLE (1 VIEW)    (Results Pending)       The CT images above were personally reviewed by myself     Assessment  Principal Problem:    Acute bacterial endocarditis  Active Problems:    Acute encephalopathy    Folliculitis    S/P  shunt    Anxiety    Hypokalemia    Hypomagnesemia    Sepsis (HCC)    Septic shock (HCC)    Anemia    Thrombocytopenia (HCC)    Port or reservoir infection    Chronic pain syndrome    H/O: CVA (cerebrovascular accident)     Pseudotumor cerebri    Tachycardia    Hyponatremia    GERD (gastroesophageal reflux disease)    History of complicated migraines    Acute respiratory failure with hypoxia (HCC)    Prolonged Q-T interval on ECG    Empyema of right pleural space (HCC)    CSF pleocytosis    Bacteremia due to methicillin susceptible Staphylococcus aureus (MSSA)    History of vasculitis    Pneumonia    Atelectasis    Fever    Pulmonary abscess (HCC)    Trapped lung    Pleural effusion, left    Anasarca    Thrombocytosis (HCC)    Spinal cord stimulator status    Goals of care, counseling/discussion    Agitation requiring sedation protocol    Elevated alkaline phosphatase level    Debility    Vaginal itching    Plan IR   Pulmonary abscess  6/15- Right Pleural abscess drain placement   6/16- Chest tube with 10 ml of dark red output since placement   - 40mmhg wall suction   - Fluid cultures pending   - Pulmonary and ID following     Thank you for allowing Interventional Radiology team to participate in the patients care, if any additonal care or requests are needed in the future please do not hesitate call or place IR order      [id number]

## 2021-06-16 NOTE — PROGRESS NOTES
Pharmacy Pharmacotherapy Consult for LOS >30 days    Admit Date: 4/16/2021      Medications were reviewed for appropriateness and ongoing need.     Current Facility-Administered Medications   Medication Dose Route Frequency Provider Last Rate Last Admin   • morphine (pf) 4 mg/mL injection 2 mg  2 mg Intravenous Q2HRS PRN Raphael Chaudhry M.D.       • diphenhydrAMINE (BENADRYL) tablet/capsule 25 mg  25 mg Oral Q6HRS PRN Raphael Chaudhry M.D.       • ceFAZolin in dextrose (ANCEF) IVPB premix 2 g  2 g Intravenous Q8HRS Marlon Cox D.O.   Stopped at 06/16/21 0547   • diphenhydrAMINE (BENADRYL) injection 50 mg  50 mg Intravenous Q6HRS PRN Pascual Augustine M.D.   50 mg at 06/16/21 0412   • acetaminophen (TYLENOL) tablet 500 mg  500 mg Oral TID Bill Marcum M.D.   500 mg at 06/15/21 2141   • divalproex (DEPAKOTE SPRINKLE) capsule 500 mg  500 mg Oral Q8HRS Bill Marcum M.D.   500 mg at 06/16/21 0411   • DULoxetine (CYMBALTA) capsule 60 mg  60 mg Oral BID Bill Marcum M.D.   60 mg at 06/16/21 0411   • gabapentin (NEURONTIN) capsule 300 mg  300 mg Oral Q8HRS Bill Marcum M.D.   300 mg at 06/16/21 0411   • methadone (DOLOPHINE) tablet 30 mg  30 mg Oral DAILY Bill Marcum M.D.   30 mg at 06/16/21 0516   • PARoxetine (PAXIL) tablet 10 mg  10 mg Oral QAM Bill Marcum M.D.   10 mg at 06/16/21 0411   • risperiDONE (RISPERDAL) tablet 2 mg  2 mg Oral BID Bill Marcum M.D.   2 mg at 06/16/21 0411   • senna-docusate (PERICOLACE or SENOKOT S) 8.6-50 MG per tablet 2 tablet  2 tablet Oral BID Bill Marcum M.D.   2 tablet at 06/16/21 0411    And   • polyethylene glycol/lytes (MIRALAX) PACKET 1 Packet  1 Packet Oral QDAY PRN Bill Marcum M.D.        And   • magnesium hydroxide (MILK OF MAGNESIA) suspension 30 mL  30 mL Oral QDAY PRELISE Marcum M.D.        And   • bisacodyl (DULCOLAX) suppository 10 mg  10 mg Rectal QDAY PRELISE Marcum M.D.       •  acetaminophen (Tylenol) tablet 650 mg  650 mg Oral Q4HRS PRN Bill Marcum M.D.   650 mg at 05/30/21 0457   • diazePAM (VALIUM) tablet 5 mg  5 mg Oral QHS PRN Bill Marcum M.D.   5 mg at 06/14/21 2122   • oxyCODONE immediate-release (ROXICODONE) tablet 5 mg  5 mg Oral Q4HRS PRN Bill Marcum M.D.   5 mg at 06/16/21 0411   • enoxaparin (LOVENOX) inj 40 mg  40 mg Subcutaneous DAILY Juventino Lozano M.D.   40 mg at 06/16/21 0414   • Respiratory Therapy Consult   Nebulization Continuous RT Juventino Lozano M.D.       • ondansetron (ZOFRAN) syringe/vial injection 4 mg  4 mg Intravenous Q4HRS PRN Juventino Lozano M.D.   4 mg at 06/13/21 1349   • labetalol (NORMODYNE/TRANDATE) injection 10 mg  10 mg Intravenous Q4HRS PRN Juventino Lozano M.D.   10 mg at 05/04/21 0226   • ipratropium-albuterol (DUONEB) nebulizer solution  3 mL Nebulization Q2HRS PRN (RT) Juventino Lozano M.D.           Recommendations:  -D/C labetalol prn - no administration past month  -Clarify therapeutic duplication for moderate/severe pain - IV morphine or PO oxycodone    Liliane Roth, Pharm.D, BCPS, BCCP

## 2021-06-16 NOTE — CARE PLAN
The patient is Stable - Low risk of patient condition declining or worsening    Shift Goals  Clinical Goals: pts pain will be managed with medication  Patient Goals: Pain   Family Goals: NA    Progress made toward(s) clinical / shift goals:  Pt stated relief of pain and has been able to sleep.    Patient is not progressing towards the following goals:

## 2021-06-16 NOTE — PROGRESS NOTES
Pulmonary Progress Note    Date of admission  4/16/2021    Chief Complaint  50 y.o. female admitted 4/16/2021 with right-sided empyema secondary to septic emboli in the setting of MSSA bacteremia.    Hospital Course  50-year-old female with past medical history of pseudotumor cerebri status post  shunt, chronic pain syndrome with history of neurostimulator, right anterior chest port for home IV medication administration presented to [facility] with recurrent port infection initially treated with vancomycin and Zosyn.'s port was removed by surgery on 4/12. Patient was later noted to have endocarditis of the tricuspid valve, lumbar puncture was ultimately performed which showed pleocytosis level otherwise from a unremarkable. Patient was found to have septic emboli on CT with right greater than left effusion which was loculated, status post several chest tubes and right thoracoscopy, decortication x2. Patient had a repeat CT scan done on 6/10 which showed right 8x6cm loculated pleural fluid collection. Chest tube replaced on 6/15 by IR after patient was deemed not a surgical candidate.    Pulmonology has been consulted for management of chest tube.    Interval Problem Update  6/15: No acute events overnight, chest tube placed 6/15, suction and cues to increase to -40, draining minimal thick bloody fluid. Patient remains afebrile, no leukocytosis. No complaints. Antibiotics as per ID.    6/16: No acute events overnight, chest tube minimal thick bloody output ~16 ml. Minimal oxygen requirements, Gram stain no organisms seen. Chest tube removed on 6/16.    Review of Systems  Review of Systems   Constitutional: Negative for chills and fever.   HENT: Negative for ear pain, hearing loss and tinnitus.    Eyes: Negative for blurred vision and double vision.   Respiratory: Positive for cough and sputum production. Negative for hemoptysis and wheezing.    Cardiovascular: Negative for chest pain and  palpitations.   Gastrointestinal: Negative for heartburn, nausea and vomiting.   Genitourinary: Negative for dysuria and urgency.   Musculoskeletal: Negative for falls and myalgias.   Skin: Negative for itching and rash.   Neurological: Negative for dizziness, tingling and headaches.   Psychiatric/Behavioral: Negative for memory loss. The patient is not nervous/anxious.           Vital Signs for last 24 hours   Temp:  [36.1 °C (97 °F)-36.7 °C (98.1 °F)] 36.2 °C (97.2 °F)  Pulse:  [68-99] 99  Resp:  [14-18] 16  BP: ()/(47-59) 91/47  SpO2:  [95 %-98 %] 96 %    Physical Exam   Physical Exam  Constitutional:       Appearance: Normal appearance.   HENT:      Head: Normocephalic and atraumatic.   Eyes:      Extraocular Movements: Extraocular movements intact.      Conjunctiva/sclera: Conjunctivae normal.      Pupils: Pupils are equal, round, and reactive to light.   Cardiovascular:      Rate and Rhythm: Normal rate and regular rhythm.      Heart sounds: Normal heart sounds.   Pulmonary:      Breath sounds: No wheezing.      Comments: Diminished breath sounds on right.    Abdominal:      General: There is no distension.      Palpations: Abdomen is soft.      Tenderness: There is no abdominal tenderness.   Musculoskeletal:      Right lower leg: No edema.      Left lower leg: No edema.   Skin:     General: Skin is warm.      Capillary Refill: Capillary refill takes less than 2 seconds.      Coloration: Skin is not jaundiced.   Neurological:      General: No focal deficit present.      Mental Status: She is alert and oriented to person, place, and time.   Psychiatric:         Mood and Affect: Mood normal.         Judgment: Judgment normal.           Medications  Current Facility-Administered Medications   Medication Dose Route Frequency Provider Last Rate Last Admin   • diphenhydrAMINE (BENADRYL) tablet/capsule 25 mg  25 mg Oral Q6HRS PRN Raphael Chaudhry M.D.       • ceFAZolin in dextrose (ANCEF) IVPB premix 2 g  2 g  Intravenous Q8HRS Marlon Cox D.O. 200 mL/hr at 06/16/21 1433 2 g at 06/16/21 1433   • diphenhydrAMINE (BENADRYL) injection 50 mg  50 mg Intravenous Q6HRS PRN Raphael Chaudhry M.D.   50 mg at 06/16/21 1043   • acetaminophen (TYLENOL) tablet 500 mg  500 mg Oral TID Raphael Chaudhry M.D.   500 mg at 06/16/21 1432   • divalproex (DEPAKOTE SPRINKLE) capsule 500 mg  500 mg Oral Q8HRS Raphael Chaudhry M.D.   500 mg at 06/16/21 1432   • DULoxetine (CYMBALTA) capsule 60 mg  60 mg Oral BID Raphael Chaudhry M.D.   60 mg at 06/16/21 0411   • gabapentin (NEURONTIN) capsule 300 mg  300 mg Oral Q8HRS Raphael Chaudhry M.D.   300 mg at 06/16/21 1431   • methadone (DOLOPHINE) tablet 30 mg  30 mg Oral DAILY Raphael Chaudhry M.D.   30 mg at 06/16/21 0516   • PARoxetine (PAXIL) tablet 10 mg  10 mg Oral QAM Raphael Chaudhry M.D.   10 mg at 06/16/21 0411   • risperiDONE (RISPERDAL) tablet 2 mg  2 mg Oral BID Raphael Chaudhry M.D.   2 mg at 06/16/21 0411   • senna-docusate (PERICOLACE or SENOKOT S) 8.6-50 MG per tablet 2 tablet  2 tablet Oral BID Raphael Chaudhry M.D.   2 tablet at 06/16/21 0411    And   • polyethylene glycol/lytes (MIRALAX) PACKET 1 Packet  1 Packet Oral QDAY TANO Chaudhry M.D.        And   • magnesium hydroxide (MILK OF MAGNESIA) suspension 30 mL  30 mL Oral QDAY TANO Chaudhry M.D.        And   • bisacodyl (DULCOLAX) suppository 10 mg  10 mg Rectal QDAY PRELISE Chaudhry M.D.       • acetaminophen (Tylenol) tablet 650 mg  650 mg Oral Q4HRS TANO Chaudhry M.D.   650 mg at 05/30/21 0457   • diazePAM (VALIUM) tablet 5 mg  5 mg Oral QHS PRN Raphael Chaudhry M.D.   5 mg at 06/14/21 2122   • oxyCODONE immediate-release (ROXICODONE) tablet 5 mg  5 mg Oral Q4HRS PRN Raphael Chaudhry M.D.   5 mg at 06/16/21 1432   • enoxaparin (LOVENOX) inj 40 mg  40 mg Subcutaneous DAILY Raphael Chaudhry M.D.   40 mg at 06/16/21 0414   • Respiratory Therapy Consult   Nebulization Continuous RT Raphael Chaudhry M.D.       • ondansetron (ZOFRAN) syringe/vial injection 4 mg  4 mg Intravenous Q4HRS PRN Juventino Lozano,  M.D.   4 mg at 06/13/21 1349       Fluids    Intake/Output Summary (Last 24 hours) at 6/16/2021 1521  Last data filed at 6/16/2021 1200  Gross per 24 hour   Intake 860 ml   Output 10 ml   Net 850 ml       Laboratory          Recent Labs     06/14/21  0055 06/15/21  0315 06/16/21  0320   SODIUM 127* 132* 132*   POTASSIUM 4.1 3.7 4.0   CHLORIDE 93* 97 96   CO2 26 28 28   BUN 7* 5* 6*   CREATININE 0.27* 0.23* 0.24*   CALCIUM 8.9 8.8 8.9     Recent Labs     06/14/21  0055 06/15/21  0315 06/16/21  0320   ALTSGPT <5  --   --    ASTSGOT 13  --   --    ALKPHOSPHAT 293*  --   --    TBILIRUBIN 0.3  --   --    GLUCOSE 76 76 78     Recent Labs     06/14/21  0055 06/15/21  0315 06/15/21  1005 06/16/21  0320   WBC 4.5* 4.2*  --  4.2*   NEUTSPOLYS 62.30 57.10  --   --    LYMPHOCYTES 29.80 31.80  --   --    MONOCYTES 7.10 9.50  --   --    EOSINOPHILS 0.20 0.20 0  --    BASOPHILS 0.40 0.70  --   --    ASTSGOT 13  --   --   --    ALTSGPT <5  --   --   --    ALKPHOSPHAT 293*  --   --   --    TBILIRUBIN 0.3  --   --   --      Recent Labs     06/14/21  0055 06/15/21  0315 06/16/21  0320   RBC 4.20 3.61* 3.82*   HEMOGLOBIN 11.8* 10.1* 10.7*   HEMATOCRIT 37.6 32.3* 34.2*   PLATELETCT 359 335 399     Imaging  CT:    Reviewed  Loculated right anterior and anterior/lateral rim-enhancing pleural fluid . Extent is from just below the lung apex to the diaphragm and the largest component measures 8.0 x 6.0 cm transversely on  6/10.      Assessment/Plan  Empyema of right pleural space (HCC)- (present on admission)  Assessment & Plan  complicated h/o R>L empyema s/p multiple chest tubes and decortication over the last 2 months, in the setting of septic emboli from MSSA bacteremia.  Repeat CT on 6/10 revealed possible residual empyema 8x6cm right anterior chest. IR guided chest tube placed on 6/15.  -This doesnot appear to be a complicated para pneumonic effusion as patient has been afebrile, no evidence of leukocytosis and is clinically stable  otherwise.  -Minimal chest tube drainage, Gram stain no organisms, unlikely infection. This is most likely postsurgical hemothorax.     Plan:  - chest tube removed. CXR after removal, no appreciable pneumothorax.  - pulmonology will sign off, please feel free to reach out with any further questions.       I have performed a physical exam and reviewed and updated ROS and Plan today (6/16/2021). In review of yesterday's note (6/15/2021), there are no changes except as documented above.     Discussed patient condition and risk of morbidity and/or mortality with Family, RN and Patient    __________  Blas Jordan MD  Pulmonary and Critical Care Medicine  [facility]

## 2021-06-16 NOTE — PROGRESS NOTES
Bedside report received. POC discussed with pt; all questions answered at this time.     Assessment/description of ears? Bilateral pink, intact and blanching  Which preventative measures are in place for the ears? Gray foam padding on oxygen silicone tubing, encourage pt to remove glasses prior to going to sleep     Assessment/description of elbows?  Bilateral pink, intact and blanching     Which preventative measures are in place for the elbows?  Pressure redistribution mattress with waffle overlay, encourage pt to reposition, ambulate, pillows for support/positioning     Assessment/description of sacrum? Red/pink, rashy, and blanching  Which preventative measures are in place for the sacrum? Pressure redistribution mattress with waffle overlay, encourage pt to reposition, ambulate, pillows for support/positioning, up to bathroom/commode, barrier paste as appropriate     Assessment/description of heels?  Bilateral pink, intact and blanching     Which preventative measures are in place for the heels?  Pressure redistribution mattress with waffle overlay, encourage pt to reposition, ambulate, pillows for support/positioning     Which devices are in place?  PIV, NC, R chest drain tube  Description of skin under devices:  CDI  Which preventative measures are in place under devices? Q shift assessment, gray foam padding on silicone oxygen silicone tubing, gauze padding under valve     Other:  DTI's R great toe and 3rd left toe

## 2021-06-16 NOTE — PROGRESS NOTES
Bedside report received. Pt is A&O X4, on 1L O2, NC, VSS. Pt denies having any pain at the present time. Pt has a left side upper anterior chest drain in place w/ 40 mmHG of suction. She is able to ambulate by self with steady gait, no assistance. Hourly rounding is in place. POC discussed with pt; all questions answered at this time.

## 2021-06-16 NOTE — CARE PLAN
The patient is Watcher - Medium risk of patient condition declining or worsening    Shift Goals  Clinical Goals: patients pain will be managed with medication  Patient Goals: Pain   Family Goals: NA    Progress made toward(s) clinical / shift goals:  Patient medicated per MAR for pain management.        Patient is not progressing towards the following goals:    Patient does not report pain relief with medications, however, patients blood pressure remained below  during shift.  Patient was drowsy and easily aroused.    Problem: Pain Management  Goal: Pain level will decrease to patient's comfort goal  6/15/2021 1838 by Camelia Agosto R.N.  Outcome: Not Progressing

## 2021-06-16 NOTE — CARE PLAN
The patient is Stable - Low risk of patient condition declining or worsening    Shift Goals  Clinical Goals: patients pain will be managed with medication  Patient Goals: Pain   Family Goals: NA    Progress made toward(s) clinical / shift goals:  Pt able to sleep after medication administration    Patient is not progressing towards the following goals:  Pt states pain is unrelieved with current medication regime.      Problem: Pain Management  Goal: Pain level will decrease to patient's comfort goal  6/15/2021 1838 by Camelia Agosto, R.N.  Outcome: Not Progressing  6/15/2021 1837 by Camelia Agosto, R.N.  Outcome: Progressing

## 2021-06-16 NOTE — WOUND TEAM
[facility]  Inpatient Services  Initial Wound and Skin Care Evaluation    Admission Date: 2021     Last order of IP CONSULT TO WOUND CARE was found on 6/3/2021 from Hospital Encounter on 2021         HPI, PMH, SH: Reviewed    Past Surgical History:   Procedure Laterality Date   • THORACOTOMY Right 2021    Procedure: THORACOTOMY;  Surgeon: John H Ganser, M.D.;  Location: [facility];  Service: General   • DECORTICATION  2021    Procedure: DECORTICATION, LUNG.;  Surgeon: John H Ganser, M.D.;  Location: [facility];  Service: General   • PB THORACOSCOPY,DX NO BX Right 2021    Procedure: THORACOSCOPY;  Surgeon: John H Ganser, M.D.;  Location: [facility];  Service: General   • DECORTICATION Right 2021    Procedure: DECORTICATION, LUNG;  Surgeon: John H Ganser, M.D.;  Location: [facility];  Service: General   • OTHER Left 2020    Port placement left chest   • PB IMPLANT NEUROSTIM/  2019    Procedure: INSERTION, NEUROSTIMULATOR, PERMANENT, SPINAL CORD;  Surgeon: Seven Mahoney M.D.;  Location: [facility];  Service: Pain Management   • SPINAL CORD STIMULATOR  2018    Explanted   • FULL THICKNESS SKIN GRAFT Left 2018     graft to foot (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   • OTHER SURGICAL PROCEDURE  11/10/2017    Power port   • LIVER BIOPSY  2017   •  SHUNT INSERTION  2017    Procedure:  SHUNT INSERTION;  Surgeon: Drew Berry M.D.;  Location: [facility];  Service:    • SPINAL CORD STIMULATOR  2016    Procedure: SPINAL CORD STIMULATOR FOR: PLACEMENT OF LEFT FLANK OCCIPITAL NERVE STIMULATOR BATTERY PACK;  Surgeon: Oli Oh III, M.D.;  Location: [facility];  Service:    • LUMBAR EXPLORATION N/A 2015    Procedure: LUMBAR EXPLORATION- Lumbar shunt removal ;  Surgeon: Oli Oh III, M.D.;  Location: [facility]  TOWER ORS;  Service:    • OTHER NEUROLOGICAL SURG  08/2015    Explanted lumbar shunt and explanted power port due to MRSA   • IRRIGATION & DEBRIDEMENT NEURO N/A 6/28/2015    Procedure: IRRIGATION & DEBRIDEMENT NEURO;  Surgeon: Oli Oh III, M.D.;  Location: [facility];  Service:    • SPINAL CORD STIMULATOR  2003    1st implant   • CHOLECYSTECTOMY  2001    had ruptured day before   • ENDOMETRIAL ABLATION  2001   • TUBAL COAGULATION LAPAROSCOPIC BILATERAL Bilateral 2001   • KNEE ARTHROSCOPY Right 1990   • TONSILLECTOMY  1985   • APPENDECTOMY  1982   • OTHER NEUROLOGICAL SURG  [id number]    occipital nerve stimulator-revisions for total of 9 procedures     Social History     Tobacco Use   • Smoking status: Never Smoker   • Smokeless tobacco: Never Used   Substance Use Topics   • Alcohol use: Not Currently     No chief complaint on file.    Diagnosis: Empyema lung (HCC) [J86.9]    Unit where seen by Wound Team: T813/01     WOUND CONSULT/FOLLOW UP RELATED TO:   toes    WOUND HISTORY:  No significant wound hx. PMH of pseudotumor cerebri s/p  shunt implantation.    WOUND ASSESSMENT/LDA    Wound 04/29/21 Other (comment) Toe, Hallux Right ischemic injury (Active)   Wound Image   06/16/21 0900   Site Assessment Black;Pink;Red 06/16/21 0930   Periwound Assessment Dry 06/16/21 0930   Margins Attached edges 06/16/21 0930   Closure Open to air 06/16/21 0930   Drainage Amount None 06/16/21 0930   Treatments Site care 06/16/21 0930   Wound Cleansing Not Applicable 06/16/21 0900   Dressing Cleansing/Solutions 3% Betadine 06/16/21 0930   Dressing Options Open to Air 06/16/21 0930   Dressing Changed Observed 06/16/21 0930   Dressing Status Open to Air 06/16/21 0930   Dressing Change/Treatment Frequency Daily, and As Needed 06/16/21 0930   NEXT Dressing Change/Treatment Date 06/16/21 06/16/21 0930   NEXT Weekly Photo (Inpatient Only) 06/16/21 06/16/21 0930   Wound Length (cm) 0.7 cm 06/16/21 0900   Wound Width (cm)  1 cm 06/16/21 0900   Wound Depth (cm) 0 cm 06/16/21 0900   Wound Surface Area (cm^2) 0.7 cm^2 06/16/21 0900   Wound Volume (cm^3) 0 cm^3 06/16/21 0900   Shape roun 06/16/21 0900   Wound Odor None 06/16/21 0900   Pulses 1+;Left;Right 06/16/21 0900   Exposed Structures LUCIA 06/16/21 0900   WOUND NURSE ONLY - Time Spent with Patient (mins) 30 06/16/21 0900     L 3rd toe: resolved, documented as healed      Vascular:    HARRIET:   No results found.    Lab Values:    Lab Results   Component Value Date/Time    WBC 4.2 (L) 06/16/2021 03:20 AM    RBC 3.82 (L) 06/16/2021 03:20 AM    HEMOGLOBIN 10.7 (L) 06/16/2021 03:20 AM    HEMATOCRIT 34.2 (L) 06/16/2021 03:20 AM    CREACTPROT 6.54 (H) 06/05/2021 12:15 AM    SEDRATEWES 53 (H) 06/05/2021 12:15 AM    HBA1C 5.1 03/10/2020 08:57 AM        Culture Results show:  No results found for this or any previous visit (from the past 720 hour(s)).    Pain Level/Medicated:  None       INTERVENTIONS BY WOUND TEAM:  Chart and images reviewed. Discussed with bedside RN. All areas of concern (based on picture review, LDA review and discussion with bedside RN) have been thoroughly assessed. Documentation of areas based on significant findings. This RN in to assess patient. Performed standard wound care which includes appropriate positioning, dressing removal and non-selective debridement. Pictures and measurements obtained weekly if/when required.    R 1st Toe  Preparation for Dressing removal: NA  Cleansed with: NA  Sharp debridement: NA  Dionna wound: 3% betadine  Primary Dressing: RIZWANA      Interdisciplinary consultation: Patient, Bedside RN    EVALUATION / RATIONALE FOR TREATMENT:  Most Recent Date:    6/16: L 3rd toe has mostly resolved; eschar cap is gone reveling fully granulated scar tissue. R great toe also improving well with the wound having decreased markedly since last wound team visit.  No changes made to plan of care.  6/4: Ischemic injuries to bilateral toes obtained when Pt was in [facility]  on vasopressors. Eschar appears to be improving, applied betadine to keep site dry and stable. Maintain RIZWANA.      Goals: Steady decrease in wound area and depth weekly.    WOUND TEAM PLAN OF CARE ([X] for frequency of wound follow up,):   Nursing to follow orders written for wound care. Contact wound team if area fails to progress, deteriorates or with any questions/concerns  Dressing changes by wound team:                   Follow up 3 times weekly:                NPWT change 3 times weekly:     Follow up 1-2 times weekly:      Follow up Bi-Monthly:  X nursing to perform skin care; WT following                  Follow up as needed:     Other (explain):     NURSING PLAN OF CARE ORDERS (X):  Dressing changes: See Dressing Care orders: x  Skin care: See Skin Care orders:   RN Prevention Protocol:   Rectal tube care: See Rectal Tube Care orders:   Other orders:    RSKIN:   CURRENTLY IN PLACE (X), APPLIED THIS VISIT (A), ORDERED (O):   Q shift Charles:  X  Q shift pressure point assessments:  X    Surface/Positioning   Pressure redistribution mattress            Low Airloss x         Bariatric foam      Bariatric RUDOLPH     Waffle cushion        Waffle Overlay          Reposition q 2 hours  x    TAPs Turning system     Z Thee Pillow     Offloading/Redistribution   Sacral Mepilex (Silicone dressing)     Heel Mepilex (Silicone dressing)  x       Heel float boots (Prevalon boot)             Float Heels off Bed with Pillows           Respiratory   Silicone O2 tubing         Gray Foam Ear protectors     Cannula fixation Device (Tender )          High flow offloading Clip    Elastic head band offloading device      Anchorfast                                                         Trach with Optifoam split foam x            Containment/Moisture Prevention     Rectal tube or BMS    Purwick/Condom Cath        Lopez Catheter    Barrier wipes O          Barrier paste       Antifungal tx    x  Interdry        Mobilization       Up  to chair        Ambulate    x  PT/OT      Nutrition       Dietician        Diabetes Education      PO  x   TF     TPN     NPO   # days     Other        Anticipated discharge plans: TBD  LTACH:        SNF/Rehab:                  Home Health Care:           Outpatient Wound Center:            Self/Family Care:        Other:

## 2021-06-16 NOTE — PROGRESS NOTES
Infectious Disease Progress Note    Author: Marlon Cox D.O. Date & Time created: 6/16/2021  9:35 AM    Interval History:  Cefazolin 5/18-19, 5/26-present Target 6/14/21  Total abx therapy D#62     Thoracoscopy & Decortication - 4/28  Decortication 5/17  R chest tube 6/15     PRIOR Rx:   Zosyn 4/22-4/23  Previous: Unasyn, Zosyn, Vanco, Daptomycin  Ceftaroline: start 4/13-4/15  Nafcillin 2g Q4 hrs 4/15-4/23      Meropenem 5/19-5/26   Cefepime 4/23 5/18 4/14: Unable to give name of previous NSG or neurologist. Seems confused, but able to say some sentences fluently.   4/15: Line cultures now growing MSSA. As well as from the blood. Repeat blood culture 4/13+ in both sets. Patient has worsening confusion. CSF fluid analyses suggest pleocytosis. Cultures are no growth from the CSF. Chest CT was ordered this morning indicating a loculated right pleural effusion as well as bilateral septic emboli. Dr. Mack spoke with Dr. Oh yesterday and no surgical intervention unless clinical deterioration.  4/16: Increased respiratory distress.  Tachypneic.  CT with loculated collection.  Dr Resendiz not available.  No thoracic surgeon available.  Plans to have pulmonary place CT.  Transferring to ICU.  4/17: Transferred to [facility] last night. Hgb dropped to 6.4 requiring PRBC transfusion. Requiring 2 pressors. Fio2 50%. Febrile 38.8. Pending OR decortication of empyema and hemothorax. [facility] at [facility] shows 8,371 WBC, ,000, 74% N  4/18: Chest tube was upsized by surgery yesterday, no decortication. WBC 22k. Fio2 40%. Vasopressin. Levophed down to 2mcg. Afebrile 24 hrs. Last fever 38.6 on 4/16.   4/19: Still on vent. Levo 3 mcg, vasopressin. Afebrile 72 hours. WBC 21k. BC 4/17 NGTD x 48 hours. Unable to do MRI brain given neurostimulator per RN.   4/20: Remaining afebrile. Hb 6.2 and undergoing transfusion today.WBC 24,100, 5% bands.1700 ml CT output. Copious bloody ET secretions. No pressors. Receiving  dornase and TPA per CT. Right pleural effusion not large per CXR today.   4/21: WBC 31k. Bronchoscopy yesterday showing blood. Cultures sent. TPA on hold per RN due to arvind blood from chest tube requiring PRBC transfusion for hgb 6. Pending chest CT today. Per , spinal stimulator is non-MRI compatible. Levophed 10mcg. 30% Fio2. Afebrile.   4/22: Fever 101.3 this am. WBC 20,300 down from 32,200 yesterday. BAL growing Klebsiella pneumoniae but susceptibility panel not set up until today. CTA of brain shows no lesion. CT of chest shows enlarging cavitary lung lesions bilat and large loculated new left pleural effusion and large hydropneumothorax on right. TPA & dornase infusions of right chest ended 4/20 after considerable bleeding requiring transfusion. Hb now down again to 6.8.  4/23: WBC 23k. Fio2 40%. Tmax 38.1. US of stiumlator shows small fluid collection but no drainable abscess. Pending MICAH today. Pending L chest tube placement  4/24: Continue fever 100.8-101.8. WBC 16,600. MICAH shows large vegetations on both tricuspid valve and mitral valve with mild TR & MR.  No aortic root abscess. Left chest tube placed yesterday yielding 8 ml of old bloody fluid. Bronch today revealed copious thick maroon secretions in distal trachea and both mainstem bronchi. On the right these were obstructive. Remaining off pressors. Last positive blood cultures (MSSA) were 4/16, negative 4/17.  4/25: Fever 100.6 this AM. wBC 13,300. Hb 6.6. CT: right hydropneumothorax increasing, right bronchopleural fistula, mediastinal shift ot the left , multiple cavitary pulmonary nodules, 3.1 cm left basilar mass, splenomegaly. CT of head shows dural thickening over the clivus. Lac+ GNR >100,000 col/ml growing from BAL of 4/24, presumed Klebsiella. Left peural fluid culture negative from 4/23.  4/26: Fever 100.4 last night. WBC 13,500. Hb 7.4. Plts 502,000. ESR >120. UA fairly clear except 30 mg% protein, 2-5 wbc and rare rbc. CXR  unchanged except more prominent pulmonary edema. GNR in BAL may be a different species of Klebsiella, and resistant to ampicillin & tmp-sulfa; confirmation pending.  4/28: Fever 100.8 last night. WBC 27,700. Hb 6.6. Plts 482,000. Creat 0.19. Kleb pneum in BAL on 4/24 is resistant to ampicillin & tmp-sulfa but otherwise sensitive. O2 sat 96% on FIO2 30% now, PEEP 8. Has been re-evaluated by thoracic surgeon, Dr. Ganser, who believes she will not wean without decortication on the right. Surgery anticipated today.  4/29: S/P right thoracoscopy with decortication with cultures NG at 24 hrs.  4/30: Had a bronch due to hypoxia and hypotension. CXR with worsening right hemithorax pulmonary opacities. Bloody mucous plugs removed. Pressors started. Febrile this am. Tachy in 130s. Pressors just removed. Tolerating vent, but not a SBT candidate at the moment.  5/1: Small air leak CT-2 every ~ 2/3 breathes. The K. pneumoniae from her thoracoscopy is sensitive to her cefepime so no antibiotic change needed.   5/2: No air leak today she tolerated 2  hrs of spontaneous breathing with support today.      5/3: Second day with SBT and doing well now for 2 hrs. Slight bump in temperature and      WBC's but no obvious clinical infectious changed so watch closely.      5/4: Fever still from time to time. WBCs trending up. Chest tubes in place. Trach.       5/5: She clearly is better today she was sitting up in bed with open eyes and follows commands. She still has low grade fever but her WBC's are dropping. Cefepime dose increased yesterday for increased CNS coverage if necessary. She will probably need further thoracic surgery as mentioned by Dr. Ganser. The issue of what to do with her stimulator is still up in the air for now as it probably will need to be removed but she is nort a candidate for more major surgery at this time.       5/7: She continues to improve and under go longer SBT trials. She just had a new PICC placed in her  right arm and plan is to D/C central line.        5/8: PICC placed. No fevers. Comfortable. Cortrak placed. Failing SBTs.  5/9: No acute issues. No fevers. Comfortable. Family at bedside.   5/10: No acute issues, fevers or WBC bumps. No changes in condition. She is to get her CT scan today and be reviewed by surgery  5/11:  at bedside.  Has been referred to LANE.  Repeat CT scan completed.  Results noted.  ? If Dr Ganser has seen.  Still with an empyema:  ? If candidate for further surgery.  5/12: Pending next thoracic surgery. No fever. Anxious.  5/13: ? Surgery date.  No one seems to know.  Had speaking valve in and having swallow eval with speech therapy  5/14: No acute issues. No fevers. Surgery Monday.  5/15: One CT accidentally pulled out yesterday.  Has been on T piece and tolerating. Plans for surgery Monday afternoon.  5/16: On schedule for surgery 5/17: 4:30 pm.  Moved to room T614.  No new issues.  5/17: Was sitting up in the bedside chair since change of shift.  Now walking in hallways with RN and CNA.  5/18: Decortication yesterday with 2 chest tube placement. WBC improving 21->14k  5/19: Hypotension and tachycardia.  Decreased H/H.  Placed back on vent to rest.  Dr Lozano in process of placing new line.  Antibiotics were deesculated yesterday to Cefazolin.  No cx were taken at surgery.  5/20: Pt was febrile yest afternoon 38.2, but now afebrile overnight after meropenem. WBC improved 11k->8k. Fio2 30%. Phenylephrine now off this morning. CXR shows new R consolidations.   5/21: Off pressors.  On vent: FiO2 30%, PEEP 8, PS 5 Received pRBCs yesterday.  5/22: Remains afebrile, off pressors. FiO2 30%.   5/23: Afebrile. FiO2 30%. Bronchoscopy yesterday normal.   5/24: No new cultures obtained at last bronchoscopy.  On  T piece this am: FiO2 30%.  5/25: Remaining afebrile. WBC 8600.   5/26: Chest tube removed today. Possible transfer to [facility]  5/27: O2 sat usuallyl 95% but intermittent desat to 87-89%.   Remaining afebrile.WBC 6100. Alk phos 641 but normal transaminases.   5/28: Pending [facility] transfer. NO new issues overnight  5/29: Awaiting bed at [facility]. Remaining afebrile. WBC 5800, creat 0.19.  5/31. Remaining afebrile. WBC 6000. . Remains weak and frail. Alk Phos 694 with normal transaminases. Slightly improved bilateral patchy infiltrates.   6/1: She is anxious to move on but still waiting for insurance approval to [facility].  6/2: Still awaiting bed at [facility]. Remaining afebrile. WBC 8300. Alk Phos 705 but transaminases normal and bilirubine 0.4.  6/4: Awaiting insurance auth before bed can be assigned at [facility]. Remaining afebrile. Extensive folliculitis over back and buttocks.  WBC 8300 on 6/2. Last  on 5/31.  6/5: Her ESR is down to 53 from 102 a good sign hopefully. She continues to work on her strength by walking.  6/6: Speech evaluation to check her swallow as she is stronger and can't stand pureed.  6/7: [facility] was denied by insurance. Could she go to full rehab now? Swallow eval pending.clotrimazole for vaginal drainage and itch.  6/8: Continued to be declined by insurances. No issues clinically. Physical Med declined for rehab. No fevers.  6/9: OT will recommend home PT/OT for her once he finishes her antibiotics.  6/10: Per pt and CM and OT, patient does not need SNF anymore may go straight home.   6/11: Her repeat chest CT shows an 8X6 fluid collection we will see if IR can drain the fluid before she is D/C'd.  6/12: Pulm recommends CT surgery evaluation. This is pending.   6/13: CT surgery recommends IR drain placement. Pending.   6/15: Just back from IR.  CT placed into right chest.  5 cc of thick bloody fluid obtained and sent to lab.   at bedside. States she walked six laps yesterday.  6/16: Chest tube placed. Only minimal output. Possible chest tube removal this afternoon by pulmonlogy     Review of Systems:  Review of Systems   All other systems reviewed and are negative.      Physical  Exam:  Physical Exam  Constitutional:       Appearance: Normal appearance. She is obese.   HENT:      Head: Normocephalic.   Cardiovascular:      Rate and Rhythm: Normal rate and regular rhythm.   Pulmonary:      Effort: Pulmonary effort is normal. No respiratory distress.      Comments: Diminished in R base. Chest tube intact with minimal fluid  Musculoskeletal:      Comments: R PICC intact   Skin:     General: Skin is warm and dry.   Neurological:      Mental Status: She is alert and oriented to person, place, and time.         Labs:  Recent Results (from the past 24 hour(s))   FLUID CULTURE W/GRAM STAIN    Collection Time: 06/15/21 10:05 AM    Specimen: Body Fluid   Result Value Ref Range    Significant Indicator NEG     Source BF     Site Pleural Fluid     Culture Result -     Gram Stain Result Many WBCs.  No organisms seen.      Fungal Culture    Collection Time: 06/15/21 10:05 AM    Specimen: Body Fluid   Result Value Ref Range    Significant Indicator NEG     Source BF     Site Pleural Fluid     Culture Result Culture in progress.    Fluid Cell Count    Collection Time: 06/15/21 10:05 AM   Result Value Ref Range    Fluid Type Pleural     Color-Body Fluid Red     Character-Body Fluid Bloody     Total RBC Count 59158 cells/uL    Total WBC 6897 cells/uL    Polys 100 %    Lymphs 0 %    Mononuclear Cells - Fluid 0 %    Mesothelial Cells - CSF 0 %    Fluid Histiocyte 0 %    Unidentified Cells - Fluid 0 %    Eosinophils - CSF 0 %    Fluid Basophil 0 %    Comments see below    GRAM STAIN    Collection Time: 06/15/21 10:05 AM    Specimen: Body Fluid   Result Value Ref Range    Significant Indicator .     Source BF     Site Pleural Fluid     Gram Stain Result Many WBCs.  No organisms seen.      Basic Metabolic Panel    Collection Time: 06/16/21  3:20 AM   Result Value Ref Range    Sodium 132 (L) 135 - 145 mmol/L    Potassium 4.0 3.6 - 5.5 mmol/L    Chloride 96 96 - 112 mmol/L    Co2 28 20 - 33 mmol/L    Glucose 78 65 -  99 mg/dL    Bun 6 (L) 8 - 22 mg/dL    Creatinine 0.24 (L) 0.50 - 1.40 mg/dL    Calcium 8.9 8.5 - 10.5 mg/dL    Anion Gap 8.0 7.0 - 16.0   CBC WITHOUT DIFFERENTIAL    Collection Time: 06/16/21  3:20 AM   Result Value Ref Range    WBC 4.2 (L) 4.8 - 10.8 K/uL    RBC 3.82 (L) 4.20 - 5.40 M/uL    Hemoglobin 10.7 (L) 12.0 - 16.0 g/dL    Hematocrit 34.2 (L) 37.0 - 47.0 %    MCV 89.5 81.4 - 97.8 fL    MCH 28.0 27.0 - 33.0 pg    MCHC 31.3 (L) 33.6 - 35.0 g/dL    RDW 48.7 35.9 - 50.0 fL    Platelet Count 399 164 - 446 K/uL    MPV 9.8 9.0 - 12.9 fL   ESTIMATED GFR    Collection Time: 06/16/21  3:20 AM   Result Value Ref Range    GFR If African American >60 >60 mL/min/1.73 m 2    GFR If Non African American >60 >60 mL/min/1.73 m 2     Results     Procedure Component Value Units Date/Time    Fungal Culture [[id number]] Collected: 06/15/21 1005    Order Status: Completed Specimen: Body Fluid Updated: 06/15/21 1819     Significant Indicator NEG     Source BF     Site Pleural Fluid     Culture Result Culture in progress.    GRAM STAIN [[id number]] Collected: 06/15/21 1005    Order Status: Completed Specimen: Body Fluid Updated: 06/15/21 1819     Significant Indicator .     Source BF     Site Pleural Fluid     Gram Stain Result Many WBCs.  No organisms seen.      FLUID CULTURE W/GRAM STAIN [[id number]] Collected: 06/15/21 1005    Order Status: Completed Specimen: Body Fluid Updated: 06/15/21 1819     Significant Indicator NEG     Source BF     Site Pleural Fluid     Culture Result -     Gram Stain Result Many WBCs.  No organisms seen.      FUNGAL CULTURE [[id number]]     Order Status: No result Specimen: Respirate from Other Body Fluid     FLUID CULTURE W/GRAM STAIN [[id number]]     Order Status: No result Specimen: Body Fluid from Pleural Fluid     FLUID CULTURE W/GRAM STAIN [[id number]]     Order Status: No result Specimen: Body Fluid from Thoracentesis Fluid     FUNGAL CULTURE [[id number]]  (Abnormal) Collected: 06/07/21 1248    Order  Status: Completed Specimen: Genital from Vaginal Updated: 06/10/21 0827     Significant Indicator POS     Source GEN     Site vaginal     Culture Result -      Yeast  Heavy growth      Narrative:      Collected By:[id number] STUART MELVIN  Collected By:[id number] STUART MELVIN        Hemodynamics:  Temp (24hrs), Av.2 °C (97.2 °F), Min:35.9 °C (96.6 °F), Max:36.7 °C (98.1 °F)  Temperature: 36.2 °C (97.2 °F)  Pulse  Av.7  Min: 40  Max: 220   Blood Pressure: (!) 91/47    PICC Double Lumen 21 Lumen 1: Purple Lumen 2: Red Right Brachial (Active)   Site Assessment Clean;Dry;Intact 06/15/21 08   Lumen 1 Status Scrubbed the hub prior to access;Flushed;Blood return noted 06/15/21 08   Lumen 2 Status Scrubbed the hub prior to access;Flushed;Blood return noted 06/15/21 0800   Line Secured at (cm) 1 cm 218   Extremity Circumference (cm) 30.5 cm 21 111   Dressing Type Biopatch;Occlusive;Securing device;Transparent 06/15/21 08   Dressing Status Clean;Dry;Intact 06/15/21 08   Dressing Intervention N/A 06/15/21 08   Dressing Change Due 06/19/21 06/15/21 0800   Date Primary Tubing Changed 06/14/21 06/15/21 0800   Date Secondary Tubing Changed 06/14/21 06/15/21 0800   Date IV Connector(s) Changed 06/14/21 06/15/21 0800   NEXT Primary Tubing Change  06/19/21 06/15/21 08   NEXT Secondary Tubing Change  06/15/21 06/15/21 08   NEXT IV Connector(s) Change 06/19/21 06/15/21 08   Line Necessity Assessed Antibiotic Therapy Greater than 7 Days 06/15/21 0800   $ Double Lumen PICC Charge Single kit used 21 1118     Wound:  @WOUNDLDA(4)@     Fluids:  Intake/Output                             21 0700 - 06/15/21 0659 06/15/21 07 - 2159 21 - 21 0659     1900-0659 Total 1900-0659 Total  Total                    Intake    P.O.  720  240 960  --  240 240  --  -- --    P.O. 720 240 960 -- 240 240 -- -- --    IV Piggyback   --  -- --  --  100 100  --  -- --    Volume (mL) (ceFAZolin in dextrose (ANCEF) IVPB premix 2 g) -- -- -- -- 100 100 -- -- --    Total Intake 720 240 960 -- 340 340 -- -- --       Output    Urine  --  -- --  --  -- --  --  -- --    Number of Times Voided 2 x -- 2 x -- 3 x 3 x -- -- --    Stool  --  -- --  --  -- --  --  -- --    Number of Times Stooled -- -- -- 1 x -- 1 x -- -- --    Chest Tube  --  -- --  10  -- 10  --  -- --    Output (mL) (Chest Tube Right Pleural) -- -- -- 10 -- 10 -- -- --    Total Output -- -- -- 10 -- 10 -- -- --       Net I/O     720 240 960 -10 340 330 -- -- --           GI/Nutrition:  Orders Placed This Encounter   Procedures   • Diet Order Diet: Level 6 - Soft and Bite Sized; Liquid level: Level 2 - Mildly Thick     Standing Status:   Standing     Number of Occurrences:   1     Order Specific Question:   Diet:     Answer:   Level 6 - Soft and Bite Sized [23]     Order Specific Question:   Liquid level     Answer:   Level 2 - Mildly Thick     Medications:  Current Facility-Administered Medications   Medication Last Admin   • morphine (pf) 4 mg/mL injection 2 mg     • diphenhydrAMINE (BENADRYL) tablet/capsule 25 mg     • ceFAZolin in dextrose (ANCEF) IVPB premix 2 g Stopped at 06/16/21 0547   • diphenhydrAMINE (BENADRYL) injection 50 mg 50 mg at 06/16/21 0412   • acetaminophen (TYLENOL) tablet 500 mg 500 mg at 06/15/21 2141   • divalproex (DEPAKOTE SPRINKLE) capsule 500 mg 500 mg at 06/16/21 0411   • DULoxetine (CYMBALTA) capsule 60 mg 60 mg at 06/16/21 0411   • gabapentin (NEURONTIN) capsule 300 mg 300 mg at 06/16/21 0411   • methadone (DOLOPHINE) tablet 30 mg 30 mg at 06/16/21 0516   • PARoxetine (PAXIL) tablet 10 mg 10 mg at 06/16/21 0411   • risperiDONE (RISPERDAL) tablet 2 mg 2 mg at 06/16/21 0411   • senna-docusate (PERICOLACE or SENOKOT S) 8.6-50 MG per tablet 2 tablet 2 tablet at 06/16/21 0411    And   • polyethylene glycol/lytes (MIRALAX) PACKET 1 Packet      And   • magnesium  hydroxide (MILK OF MAGNESIA) suspension 30 mL      And   • bisacodyl (DULCOLAX) suppository 10 mg     • acetaminophen (Tylenol) tablet 650 mg 650 mg at 05/30/21 0457   • diazePAM (VALIUM) tablet 5 mg 5 mg at 06/14/21 2122   • oxyCODONE immediate-release (ROXICODONE) tablet 5 mg 5 mg at 06/16/21 0411   • enoxaparin (LOVENOX) inj 40 mg 40 mg at 06/16/21 0414   • Respiratory Therapy Consult     • ondansetron (ZOFRAN) syringe/vial injection 4 mg 4 mg at 06/13/21 1349   • labetalol (NORMODYNE/TRANDATE) injection 10 mg 10 mg at 05/04/21 0226     Medical Decision Making, by Problem:  Active Hospital Problems    Diagnosis    • *Acute bacterial endocarditis [I33.0]    • Vaginal itching [N89.8]    • Debility [R53.81]    • Elevated alkaline phosphatase level [R74.8]    • Agitation requiring sedation protocol [R45.1]    • Spinal cord stimulator status [Z96.89]    • Goals of care, counseling/discussion [Z71.89]    • Fever [R50.9]    • Pulmonary abscess (HCC) [J85.2]    • 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0]    • Pseudotumor cerebri [G93.2]    • H/O: CVA (cerebrovascular accident) [Z86.73]    • Chronic pain syndrome [G89.4]    • Port or reservoir infection [T80.212A]    • Thrombocytopenia (HCC) [D69.6]    • Septic shock (HCC) [A41.9, R65.21]    • Anemia [D64.9]    • Sepsis (HCC) [A41.9]    • Hypokalemia [E87.6]    • Hypomagnesemia [E83.42]    • S/P   shunt [Z98.2]    • Anxiety [F41.9]    • Folliculitis [L73.9]    • Acute encephalopathy [G93.40]        Plan:  -Severe sepsis  -Catheter related bloodstream infection due to infected port status post removal 4/12-staph aureus  -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   - Acute left empyema s/p chest tube placement 4/23, decortication 5/17, chest tube removal 5/26 - Klebsiella  -Multiple acute septic pulmonary emboli bilaterally  -Acute bilateral pneumonia due to above  --Pulmonary edema  -Pseudotumor cerebri with underlying  shunt: possible arachnoiditis  -Chronic migraine headaches  -History of autoimmune vasculitis  -Elevated alkaline phosphatase & bilirubin  -Acute encephalopathy due to sepsis  - Anemia due to above  - Acute fever on 5/19 likely from secondary VAP  - Secondary VAP R sided pneumonia  - decubitus ulcer sacral.   - R lung abscess 8x6 cm s/p IR chest tube 6/15 - cultures NGTD     Plan   - New CT placed in the right chest 6/15. Minimal output. Primarily bloody on analysis. WBC 6897, RBC 30752, 100% polys. Cultures NGTD, but has been on abx. Likely the same staph or klebsiella.   - possible chest tube removal today by pulmonology given minimum output  - new 4 week target date 7/12/21 from chest tube placement  - Will need suppression after therapy for her HW to include spinal stimulator,  shunt. Given MSSA - Keflex appropriate     Dispo: Pt has Medicare/[employer]. Does not have transportation but can make arrangements to followup weekly in [facility]. [facility] infusion approved, using ertapenem 1gm Q 24 hours.   LEVI will arrange followup visit next week    Anticipate discharge soon in next 48 hrs if chest tube is removed    30 min spent with 50% face to face care.   Case disussed with patient, and Dr Chaudhry

## 2021-06-17 PROCEDURE — RXMED WILLOW AMBULATORY MEDICATION CHARGE: Performed by: STUDENT IN AN ORGANIZED HEALTH CARE EDUCATION/TRAINING PROGRAM

## 2021-06-17 PROCEDURE — 700111 HCHG RX REV CODE 636 W/ 250 OVERRIDE (IP): Performed by: STUDENT IN AN ORGANIZED HEALTH CARE EDUCATION/TRAINING PROGRAM

## 2021-06-17 PROCEDURE — 700111 HCHG RX REV CODE 636 W/ 250 OVERRIDE (IP): Performed by: INTERNAL MEDICINE

## 2021-06-17 PROCEDURE — 99231 SBSQ HOSP IP/OBS SF/LOW 25: CPT | Performed by: STUDENT IN AN ORGANIZED HEALTH CARE EDUCATION/TRAINING PROGRAM

## 2021-06-17 PROCEDURE — 97535 SELF CARE MNGMENT TRAINING: CPT

## 2021-06-17 PROCEDURE — A9270 NON-COVERED ITEM OR SERVICE: HCPCS | Performed by: STUDENT IN AN ORGANIZED HEALTH CARE EDUCATION/TRAINING PROGRAM

## 2021-06-17 PROCEDURE — 770006 HCHG ROOM/CARE - MED/SURG/GYN SEMI*

## 2021-06-17 PROCEDURE — 700102 HCHG RX REV CODE 250 W/ 637 OVERRIDE(OP): Performed by: STUDENT IN AN ORGANIZED HEALTH CARE EDUCATION/TRAINING PROGRAM

## 2021-06-17 RX ORDER — GABAPENTIN 300 MG/1
300 CAPSULE ORAL EVERY 8 HOURS
Qty: 90 CAPSULE | Refills: 0 | Status: SHIPPED | OUTPATIENT
Start: 2021-06-17

## 2021-06-17 RX ORDER — METHADONE HYDROCHLORIDE 10 MG/1
30 TABLET ORAL DAILY
Qty: 30 TABLET | Refills: 0 | Status: CANCELLED | OUTPATIENT
Start: 2021-06-18

## 2021-06-17 RX ORDER — OXYCODONE HYDROCHLORIDE 5 MG/1
5 TABLET ORAL EVERY 6 HOURS PRN
Qty: 20 TABLET | Refills: 0 | Status: SHIPPED | OUTPATIENT
Start: 2021-06-17 | End: 2021-06-23

## 2021-06-17 RX ADMIN — DIAZEPAM 5 MG: 5 TABLET ORAL at 00:07

## 2021-06-17 RX ADMIN — DIVALPROEX SODIUM 500 MG: 125 CAPSULE ORAL at 04:51

## 2021-06-17 RX ADMIN — METHADONE HYDROCHLORIDE 30 MG: 10 TABLET ORAL at 04:52

## 2021-06-17 RX ADMIN — ACETAMINOPHEN 500 MG: 325 TABLET ORAL at 20:37

## 2021-06-17 RX ADMIN — ACETAMINOPHEN 500 MG: 325 TABLET ORAL at 09:11

## 2021-06-17 RX ADMIN — OXYCODONE 5 MG: 5 TABLET ORAL at 20:39

## 2021-06-17 RX ADMIN — DULOXETINE HYDROCHLORIDE 60 MG: 60 CAPSULE, DELAYED RELEASE ORAL at 16:36

## 2021-06-17 RX ADMIN — RISPERIDONE 2 MG: 2 TABLET ORAL at 04:52

## 2021-06-17 RX ADMIN — DIPHENHYDRAMINE HYDROCHLORIDE 50 MG: 50 INJECTION INTRAMUSCULAR; INTRAVENOUS at 23:14

## 2021-06-17 RX ADMIN — OXYCODONE 5 MG: 5 TABLET ORAL at 14:01

## 2021-06-17 RX ADMIN — OXYCODONE 5 MG: 5 TABLET ORAL at 10:16

## 2021-06-17 RX ADMIN — OXYCODONE 5 MG: 5 TABLET ORAL at 00:41

## 2021-06-17 RX ADMIN — DULOXETINE HYDROCHLORIDE 60 MG: 60 CAPSULE, DELAYED RELEASE ORAL at 04:51

## 2021-06-17 RX ADMIN — GABAPENTIN 300 MG: 300 CAPSULE ORAL at 04:51

## 2021-06-17 RX ADMIN — RISPERIDONE 2 MG: 2 TABLET ORAL at 16:36

## 2021-06-17 RX ADMIN — GABAPENTIN 300 MG: 300 CAPSULE ORAL at 14:01

## 2021-06-17 RX ADMIN — GABAPENTIN 300 MG: 300 CAPSULE ORAL at 20:38

## 2021-06-17 RX ADMIN — DIVALPROEX SODIUM 500 MG: 125 CAPSULE ORAL at 20:38

## 2021-06-17 RX ADMIN — PAROXETINE HYDROCHLORIDE 10 MG: 10 TABLET, FILM COATED ORAL at 04:52

## 2021-06-17 RX ADMIN — ENOXAPARIN SODIUM 40 MG: 40 INJECTION SUBCUTANEOUS at 04:53

## 2021-06-17 RX ADMIN — CEFAZOLIN SODIUM 2 G: 2 INJECTION, SOLUTION INTRAVENOUS at 04:51

## 2021-06-17 RX ADMIN — ACETAMINOPHEN 500 MG: 325 TABLET ORAL at 15:11

## 2021-06-17 RX ADMIN — DIPHENHYDRAMINE HYDROCHLORIDE 50 MG: 50 INJECTION INTRAMUSCULAR; INTRAVENOUS at 11:16

## 2021-06-17 RX ADMIN — CEFAZOLIN SODIUM 2 G: 2 INJECTION, SOLUTION INTRAVENOUS at 14:01

## 2021-06-17 RX ADMIN — DIPHENHYDRAMINE HYDROCHLORIDE 50 MG: 50 INJECTION INTRAMUSCULAR; INTRAVENOUS at 17:14

## 2021-06-17 RX ADMIN — DIPHENHYDRAMINE HYDROCHLORIDE 50 MG: 50 INJECTION INTRAMUSCULAR; INTRAVENOUS at 05:53

## 2021-06-17 RX ADMIN — DIVALPROEX SODIUM 500 MG: 125 CAPSULE ORAL at 14:01

## 2021-06-17 RX ADMIN — CEFAZOLIN SODIUM 2 G: 2 INJECTION, SOLUTION INTRAVENOUS at 20:39

## 2021-06-17 ASSESSMENT — ENCOUNTER SYMPTOMS
BACK PAIN: 0
LOSS OF CONSCIOUSNESS: 0
ABDOMINAL PAIN: 0
DIZZINESS: 0
NAUSEA: 0
PALPITATIONS: 0
DIARRHEA: 0
BLURRED VISION: 0
DOUBLE VISION: 0
SHORTNESS OF BREATH: 0
COUGH: 0
CHILLS: 0
SORE THROAT: 0
FEVER: 0
VOMITING: 0
HEADACHES: 0

## 2021-06-17 ASSESSMENT — COGNITIVE AND FUNCTIONAL STATUS - GENERAL
TOILETING: A LITTLE
DAILY ACTIVITIY SCORE: 21
DRESSING REGULAR LOWER BODY CLOTHING: A LITTLE
SUGGESTED CMS G CODE MODIFIER DAILY ACTIVITY: CJ
HELP NEEDED FOR BATHING: A LITTLE

## 2021-06-17 ASSESSMENT — PAIN SCALES - WONG BAKER
WONGBAKER_NUMERICALRESPONSE: HURTS JUST A LITTLE BIT
WONGBAKER_NUMERICALRESPONSE: HURTS JUST A LITTLE BIT

## 2021-06-17 ASSESSMENT — PAIN DESCRIPTION - PAIN TYPE: TYPE: ACUTE PAIN

## 2021-06-17 NOTE — CARE PLAN
The patient is Stable - Low risk of patient condition declining or worsening    Shift Goals  Clinical Goals: pt's pain will be decreased to a tolerable level  Patient Goals: Pain   Family Goals: NA    Progress made toward(s) clinical / shift goals:  Pt pain managed by prn oxy with good effects, pt sleeping with calm and unlabored breathing.      Problem: Pain Management  Goal: Pain level will decrease to patient's comfort goal  Outcome: Progressing

## 2021-06-17 NOTE — THERAPY
"Occupational Therapy  Daily Treatment     Patient Name: Enedelia Pang  Age:  50 y.o., Sex:  female  Medical Record #: [id number]  Today's Date: 6/17/2021     Precautions  Precautions: Swallow Precautions ( See Comments)  Comments: Decannulated 6/8    Assessment    Pt is at a supv-mod I level for basic self care, functional mobility, functional transfers w/ and w/o 4ww. Reviewed compensatory strategies during ADLs as well as appropriate AE/DME to maximize independence and safety. She denies any further deficits at this time and has good support from her  upon d/c.      Plan    Patient will not be actively followed for occupational therapy services at this time, however may be seen if requested by physician for 1 more visit within 30 days to address any discharge or equipment needs.       DC Equipment Recommendations: 4-Wheeled Walker, Tub / Shower Seat, Bed Side Commode  Discharge Recommendations: Recommend home health for continued occupational therapy services    Subjective    \"My neck just kind of straightened itself out on its own\"     Objective       06/17/21 1146   Cognition    Cognition / Consciousness WDL   Level of Consciousness Alert   Comments pleasant and cooperative   Other Treatments   Other Treatments Provided discussed any further d/c home concerns and DME questions   Balance   Sitting Balance (Static) Fair +   Sitting Balance (Dynamic) Fair   Standing Balance (Static) Fair +   Standing Balance (Dynamic) Fair   Weight Shift Sitting Fair   Weight Shift Standing Fair   Comments w/ and w/o 4ww   Bed Mobility    Supine to Sit Modified Independent   Scooting Modified Independent   Activities of Daily Living   Grooming Supervision;Standing  (wash hands)   Lower Body Dressing Supervision  (don pants and socks)   Toileting Supervision   Skilled Intervention Verbal Cuing   Comments pt reports shower went well with nursing staff   Functional Mobility   Sit to Stand Supervised   Bed, Chair, Wheelchair " Transfer Supervised   Toilet Transfers Supervised   Mobility EOB>BR>sink>aco   Skilled Intervention Verbal Cuing   Comments w/ 4ww   Patient / Family Goals   Patient / Family Goal #1 to get better   Goal #1 Outcome Goal met   Short Term Goals   Short Term Goal # 1 LB dressing with min A   Goal Outcome # 1 Goal met   Short Term Goal # 1 B  Pt will demo LB dressing w/ SPV   Goal Outcome  # 1 B Goal met   Short Term Goal # 2 seated g/h with SPV   Goal Outcome # 2 Goal met   Short Term Goal # 2 B  standing g/h with SPV   Goal Outcome # 2 B Goal met   Short Term Goal # 3 bsc txf with mod A   Goal Outcome # 3 Goal met   Short Term Goal # 3 B toilet txf with SPV and no v/cs for safety/technique   Goal Outcome # 3 B Goal met

## 2021-06-17 NOTE — DISCHARGE SUMMARY
Discharge Summary    CHIEF COMPLAINT ON ADMISSION  No chief complaint on file.      Reason for Admission  Empyema, sepsis due to port line infection    Admission Date  4/16/2021    CODE STATUS  Full Code    HPI & HOSPITAL COURSE  This is a 50-year-old female with a past medical history of pseudotumor cerebri s/p  shunt implantation by Dr. 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  [facility] infectious disease is managing antibiotics for the patient.  Dr. Candelaria surgery removed the port on 4/12.  The patient was further identified to have endocarditis of the tricuspid valve, a lumbar puncture performed on 4/14 showed pleocytosis with negative Gram stain.  The patient suffered worsening leukocytosis and was found to have septic emboli per CT.  Initially a small pleural catheter was placed.  Significant loculations were encountered.  MSSA empyema was diagnosed.  Neurosurgery was consulted to comment on possibly removal of a  shunt, this was felt not appropriate at this time.  The patient remained on mechanical ventilation for 15 days, then a perc trach was placed.  The patient was eventually liberated from mechanical ventilation, the patient did have a right thoracoscopy and decortication of the lung performed on 4/28 by Dr. Ganser.  The patient was of identified to have multiple sources for MSSA including mitral valve, tricuspid valve vegetation, port, spinal stimulator,  shunt possibly. Back to the OR for VAT and decortication on 5/17  Trach de-canulated 6/8. Chest CT repeat shows right side empyema. IR did drainage which shows bloody  fluid, no infection sign. Chest tube removed. Pt will be continue with outpatient infusion for one month per ID. And follow up with ID as outpatient.     In ICU, patient was put on fentanyl infusion for pain control, transit to mathodone for taper, which will be discontinue upon discharge, and continue with oxycodone for pain management and withdraw prevention. Patient will need to followup with PCP for medication refill as need.      Therefore, she is discharged in fair and stable condition to home with close outpatient follow-up.    The patient met 2-midnight criteria for an inpatient stay at the time of discharge.    Discharge Date  6/18/2021    FOLLOW UP ITEMS POST DISCHARGE  ID    DISCHARGE DIAGNOSES  Principal Problem:    Acute bacterial endocarditis POA: Yes  Active Problems:    Pulmonary abscess (HCC) POA: Unknown    Acute encephalopathy POA: Yes    Folliculitis POA: Unknown    S/P  shunt POA: Yes    Anxiety POA: Yes    Hypokalemia POA: Unknown    Hypomagnesemia POA: Unknown    Sepsis (HCC) POA: Unknown    Septic shock (HCC) POA: Yes    Anemia POA: Yes    Thrombocytopenia (HCC) POA: Yes    Port or reservoir infection POA: Unknown    Chronic pain syndrome POA: Yes    H/O: CVA (cerebrovascular accident) (Chronic) POA: Yes    Pseudotumor cerebri POA: Unknown      Overview: IMO load March 2020    Tachycardia POA: Unknown    Hyponatremia POA: Unknown    GERD (gastroesophageal reflux disease) POA: Yes    History of complicated migraines POA: Yes    Acute respiratory failure with hypoxia (HCC) POA: Yes    Prolonged Q-T interval on ECG POA: Yes    Empyema of right pleural space (HCC) POA: Yes    CSF pleocytosis POA: Yes    Bacteremia due to methicillin susceptible Staphylococcus aureus (MSSA) POA: Yes    History of vasculitis POA: Yes    Pneumonia POA: Unknown    Atelectasis POA: Unknown    Fever POA: Unknown    Trapped lung POA: Unknown    Pleural effusion, left POA: Unknown    Anasarca POA: Yes    Thrombocytosis  (HCC) POA: Unknown    Spinal cord stimulator status POA: Yes    Goals of care, counseling/discussion POA: Unknown    Agitation requiring sedation protocol POA: Yes    Elevated alkaline phosphatase level POA: Yes    Debility POA: Unknown    Vaginal itching POA: Unknown  Resolved Problems:    Abnormal movement POA: Yes      FOLLOW UP  No future appointments.  Elliott Power M.D.  [address] [facility]  [place] [address]  [phone]    Call  Please call to schedule a follow up appointment as needed. Thank You.    Elliott Power M.D.  [address] [facility] [address]  [phone]    In 1 week   patient was put on fentanyl infusion for pain control, transit to mathodone for taper, which will be discontinue upon discharge, and continue with oxycodone for pain management and withdraw prevention. Patient will need to followup with PCP for medicati      MEDICATIONS ON DISCHARGE     Medication List      ASK your doctor about these medications      Instructions   Aimovig 70 MG/ML Soaj  Generic drug: Erenumab   Inject 70 mg under the skin Q30 DAYS.  Dose: 70 mg     aspirin 81 MG EC tablet   Take 81 mg by mouth every morning.  Dose: 81 mg     diphenhydrAMINE 50 MG/ML Soln  Commonly known as: BENADRYL   Inject 75 mg into the shoulder, thigh, or buttocks 3 times a day as needed (Migraine).  Dose: 75 mg     divalproex  MG Tb24  Commonly known as: DEPAKOTE ER   Take 500 mg by mouth 3 times a day. 2 tablets = 500 mg.  Dose: 500 mg     DULoxetine 60 MG Cpep delayed-release capsule  Commonly known as: CYMBALTA   Take 60 mg by mouth 2 times a day.  Dose: 60 mg     ketorolac 60 MG/2ML Soln  Commonly known as: TORADOL   Inject 60 mg into the shoulder, thigh, or buttocks 3 times a day as needed (Migraine).  Dose: 60 mg     levetiracetam 750 MG tablet  Commonly known as: KEPPRA   Take 1 Tab by mouth 2 Times a Day.  Dose: 750 mg     naproxen 220 MG tablet  Commonly known as: ANAPROX   Take 440 mg by mouth every 12 hours as needed  "(Migraine). 2 tablets = 440 mg.  Dose: 440 mg     PARoxetine 10 MG Tabs  Commonly known as: PAXIL   Take 10 mg by mouth every morning.  Dose: 10 mg     risperiDONE 2 MG Tabs  Commonly known as: RISPERDAL   Take 2 mg by mouth 2 times a day.  Dose: 2 mg     sodium chloride 1 GM Tabs  Commonly known as: SALT   Take 1 tablet by mouth every day.  Dose: 1 g     Ubrelvy 100 MG Tabs  Generic drug: Ubrogepant   Take 100 mg by mouth 2 times a day as needed (Migraine). May take a 2nd dose after 4 hours if migraine persists. Max of 2 tablets in 24 hours.  Dose: 100 mg     Womens Multivitamin Tabs   Take 1 tablet by mouth 2 times a day. \"Equate Women's Multivitamin/Multimineral\"  Dose: 1 tablet            Allergies  Allergies   Allergen Reactions   • Sumatriptan Anaphylaxis     Historical=\"Heart stops.\" Reaction  between 1995 to 1997   • Prochlorperazine Rash and Swelling     Tongue swelling. Reaction as a teen. (compazine)  Tolerated Phenergan on 02/24/15   • Vancomycin Shortness of Breath and Rash     Reaction in 2005.  D/W patient 8/31/15 - tolerated loading dose of vancomycin administered on 8/30/15 with some itching to chest with decreased infusion rate. Red Man Syndrome.  2018-tolerated slow drip with benadryl pre-med-had no problems.       DIET  Orders Placed This Encounter   Procedures   • Diet Order Diet: Level 6 - Soft and Bite Sized; Liquid level: Level 2 - Mildly Thick     Standing Status:   Standing     Number of Occurrences:   1     Order Specific Question:   Diet:     Answer:   Level 6 - Soft and Bite Sized [23]     Order Specific Question:   Liquid level     Answer:   Level 2 - Mildly Thick       ACTIVITY  As tolerated.  Weight bearing as tolerated    CONSULTATIONS  ID  Pulmonary  Surgeon  chronic    PROCEDURES  s/p multiple chest tubes, thoracotomy and decortication  Now s/p second decortication 5/17        LABORATORY  Lab Results   Component Value Date    SODIUM 132 (L) 06/16/2021    POTASSIUM 4.0 06/16/2021    " CHLORIDE 96 06/16/2021    CO2 28 06/16/2021    GLUCOSE 78 06/16/2021    BUN 6 (L) 06/16/2021    CREATININE 0.24 (L) 06/16/2021        Lab Results   Component Value Date    WBC 4.2 (L) 06/16/2021    HEMOGLOBIN 10.7 (L) 06/16/2021    HEMATOCRIT 34.2 (L) 06/16/2021    PLATELETCT 399 06/16/2021        Total time of the discharge process exceeds 45 minutes.

## 2021-06-17 NOTE — DISCHARGE PLANNING
Agency/Facility Name: [facility]  Spoke To: Tosin  Outcome: Referral not received.  Re-faxed at [phone].

## 2021-06-17 NOTE — PROGRESS NOTES
Hospital Medicine Daily Progress Note    Date of Service  6/17/2021    Chief Complaint  50 y.o. female admitted 4/16/2021 with altered mental status    Hospital Course  This is a 50-year-old female with a past medical history of pseudotumor cerebri s/p  shunt implantation by Dr. 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  [facility] infectious disease is managing antibiotics for the patient.  Dr. Candelaria surgery removed the port on 4/12.  The patient was further identified to have endocarditis of the tricuspid valve, a lumbar puncture performed on 4/14 showed pleocytosis with negative Gram stain.  The patient suffered worsening leukocytosis and was found to have septic emboli per CT.  Initially a small pleural catheter was placed.  Significant loculations were encountered.  MSSA empyema was diagnosed.  Neurosurgery was consulted to comment on possibly removal of a  shunt, this was felt not appropriate at this time.  The patient remained on mechanical ventilation for 15 days, then a perc trach was placed.  The patient was eventually liberated from mechanical ventilation, the patient did have a right thoracoscopy and decortication of the lung performed on 4/28 by Dr. Ganser.  The patient was of identified to have multiple sources for MSSA including mitral valve, tricuspid valve vegetation, port, spinal stimulator,  shunt possibly. Back to the OR for VAT and decortication on 5/17  Trach de-canulated 6/8       Interval Problem Update    ID Consult, Pulmonology and surgeon consult appreciated;  chest tube removed yesterday    On  ancef  Afebrile  No other complaints    PT/OT , will discharge once recommendation is available.    Consultants/Specialty  ID  Pulmonary  surgeon    Code Status  Full Code    Disposition  Abx's stop date 6/14  Plan repeat CT chest prior to completion of Abx's    Review of Systems  Review of Systems   Constitutional: Negative for chills and fever.   HENT: Negative for sore throat.    Eyes: Negative for blurred vision and double vision.   Respiratory: Negative for cough and shortness of breath.    Cardiovascular: Negative for chest pain, palpitations and leg swelling.   Gastrointestinal: Negative for abdominal pain, diarrhea, nausea and vomiting.   Genitourinary: Negative for dysuria and urgency.   Musculoskeletal: Negative for back pain.   Skin: Negative for rash.   Neurological: Negative for dizziness, loss of consciousness and headaches.        Physical Exam  Temp:  [36.1 °C (97 °F)-36.6 °C (97.9 °F)] 36.1 °C (97 °F)  Pulse:  [76-86] 76  Resp:  [16-18] 16  BP: ()/(50-65) 84/50  SpO2:  [94 %-97 %] 97 %    Physical Exam  Vitals reviewed.   Constitutional:       General: She is not in acute distress.     Appearance: Normal appearance. She is well-developed. She is not diaphoretic.   HENT:      Head: Normocephalic and atraumatic.   Eyes:      General: No scleral icterus.        Right eye: No discharge.         Left eye: No discharge.   Neck:      Vascular: No JVD.   Cardiovascular:      Rate and Rhythm: Normal rate and regular rhythm.      Heart sounds: Murmur heard.     Pulmonary:      Effort: Pulmonary effort is normal. No respiratory distress.      Breath sounds: No stridor. No wheezing or rales.   Abdominal:      Palpations: Abdomen is soft.   Musculoskeletal:         General: No tenderness.      Right lower leg: No edema.      Left lower leg: No edema.   Skin:     General: Skin is warm and dry.      Findings: No rash (butt).   Neurological:      General: No focal deficit present.      Mental Status: She is  alert and oriented to person, place, and time.   Psychiatric:         Mood and Affect: Mood normal.         Thought Content: Thought content normal.         Fluids    Intake/Output Summary (Last 24 hours) at 6/17/2021 1148  Last data filed at 6/16/2021 2000  Gross per 24 hour   Intake 850 ml   Output --   Net 850 ml       Laboratory  Recent Labs     06/15/21  0315 06/16/21  0320   WBC 4.2* 4.2*   RBC 3.61* 3.82*   HEMOGLOBIN 10.1* 10.7*   HEMATOCRIT 32.3* 34.2*   MCV 89.5 89.5   MCH 28.0 28.0   MCHC 31.3* 31.3*   RDW 48.4 48.7   PLATELETCT 335 399   MPV 9.7 9.8     Recent Labs     06/15/21  0315 06/16/21  0320   SODIUM 132* 132*   POTASSIUM 3.7 4.0   CHLORIDE 97 96   CO2 28 28   GLUCOSE 76 78   BUN 5* 6*   CREATININE 0.23* 0.24*   CALCIUM 8.8 8.9                   Imaging  DX-CHEST-PORTABLE (1 VIEW)   Final Result         No appreciable pneumothorax on the post removal of right pigtail chest tube      DX-CHEST-PORTABLE (1 VIEW)   Final Result      No significant interval change.      DX-CHEST-PORTABLE (1 VIEW)   Final Result         No significant interval change.      CT-CHEST TUBE-EMPYEMA RIGHT   Final Result      Successful RIGHT chest tube placement.         EC-ECHOCARDIOGRAM COMPLETE W/ CONT   Final Result      CT-CHEST (THORAX) WITH   Final Result      1.  Near complete resolution of multifocal bilateral pulmonary airspace process consistent with pneumonia      2.  Loculated right anterior and anterior/lateral rim-enhancing pleural fluid collection consistent with an empyema. Extent is from just below the lung apex to the diaphragm and the largest component measures 8.0 x 6.0 cm transversely      3.  Ill-defined left upper lobe pulmonary nodule which are most likely postinflammatory      4.  Hepatic steatosis and hepatomegaly                  DX-CHEST-PORTABLE (1 VIEW)   Final Result      1.  Interval removal of the right-sided chest tubes. No pneumothorax is identified.   2.  Airspace opacities in the right  are mildly improved and may represent atelectasis/consolidation.   3.  There is likely a small right pleural effusion.   4.  Patchy opacities on the left are mildly improved.   5.  Interval removal of the enteric tube.      DX-CHEST-PORTABLE (1 VIEW)   Final Result         1. Stable lines and tubes.   2. Stable ill-defined bilateral pulmonary opacities, right worse than left. No large pleural effusions.         DX-CHEST-PORTABLE (1 VIEW)   Final Result      1.  Decrease in volume of right pleural fluid collection/empyema with 2 right chest tubes in place. No pneumothorax identified.      2.  No focal consolidation in the left lung.      DX-CHEST-PORTABLE (1 VIEW)   Final Result      No significant interval change.      DX-CHEST-PORTABLE (1 VIEW)   Final Result         1.  Hazy right pulmonary infiltrates and/or effusion, stable compared to prior study.      DX-CHEST-PORTABLE (1 VIEW)   Final Result         1.  Hazy right pulmonary infiltrates and/or effusion, stable compared to prior study.      DX-CHEST-PORTABLE (1 VIEW)   Final Result      1.  All lines and tubes appear appropriately located      2.  Consolidation throughout the right lung consistent with pneumonia      DX-CHEST-PORTABLE (1 VIEW)   Final Result         1.  Hazy right pulmonary infiltrates and/or effusion, increased compared to prior study.      DX-ABDOMEN FOR TUBE PLACEMENT   Final Result      Feeding tube looped in the stomach.      DX-CHEST-PORTABLE (1 VIEW)   Final Result         1. Stable lines and tubes. No pneumothorax detected.   2. Persistent opacities in the right perihilar region and in the right lung periphery, similar to prior study. Left lung is essentially clear.      DX-CHEST-PORTABLE (1 VIEW)   Final Result      Postoperative changes of the right chest with placement of an additional chest tube. There is improved aeration in the right lung. No significant pleural effusion or definite pneumothorax.      Right IJ central venous  catheter tip projects over the proximal right atrium.      Hypoinflation with interstitial and hazy pulmonary opacities.      DX-CHEST-PORTABLE (1 VIEW)   Final Result         1.  Hazy right pulmonary infiltrates and/or effusion, similar compared to prior study.      DX-ABDOMEN FOR TUBE PLACEMENT   Final Result      Feeding tube tip projects over the expected location the gastric antrum.      DX-CHEST-PORTABLE (1 VIEW)   Final Result         1.  Hazy right pulmonary infiltrates and/or effusion, similar compared to prior study.      DX-CHEST-LIMITED (1 VIEW)   Final Result      1.  No pneumothorax. Only one chest tube is now seen in the right lung.   2.  The chest is otherwise stable.      DX-CHEST-PORTABLE (1 VIEW)   Final Result         1.  Hazy right pulmonary infiltrates and/or effusion, similar compared to prior study.      DX-CHEST-PORTABLE (1 VIEW)   Final Result         1.  Hazy right pulmonary infiltrates and/or effusion, similar compared to prior study.      DX-ABDOMEN FOR TUBE PLACEMENT   Final Result         1.  Nonspecific bowel gas pattern.   2.  Dobbhoff tube tip terminates overlying the expected location of the gastric antrum.   3.  Hazy bilateral lung base infiltrates, greater on the right.      DX-CHEST-PORTABLE (1 VIEW)   Final Result         1.  Hazy right pulmonary infiltrates and/or effusion, similar compared to prior study.      US-RUQ   Final Result         1.  Hepatomegaly   2.  Mild intrahepatic and extrahepatic biliary ductal dilatation, commonly associated with postcholecystectomy physiology, consider causes of biliary obstruction with additional workup as clinically appropriate      CT-CHEST,ABDOMEN,PELVIS WITH   Final Result      1.  Large empyema in the RIGHT hemithorax, slightly decreased in size from prior exam with chest tubes present.   2.  Consolidation remains.   3.  Multiple cavitary lesions again seen in the LEFT upper lobe, minimally decreased from prior, concerning for septic  emboli.   4.  Supportive tubing is unchanged.   5.  Intrahepatic and extrahepatic biliary dilation, likely postoperative although distal stricture, stone or mass remain possibilities.   6.  Moderate pelvic fluid, nonspecific.   7.  No evidence of bowel obstruction or perforation.      DX-CHEST-PORTABLE (1 VIEW)   Final Result         1.  Hazy right pulmonary infiltrates and/or effusion, similar compared to prior study.      DX-CHEST-PORTABLE (1 VIEW)   Final Result         No significant interval change.            DX-CHEST-PORTABLE (1 VIEW)   Final Result         Interval removal of left central venous catheter. Interval adjustment of right PICC with tip in the SVC.         DX-ABDOMEN FOR TUBE PLACEMENT   Final Result      Feeding tube tip at the mid to distal stomach.      IR-PICC LINE PLACEMENT W/ GUIDANCE > AGE 5   Final Result                  Ultrasound-guided PICC placement performed by qualified nursing staff as    above.          DX-CHEST-PORTABLE (1 VIEW)   Final Result      1.  Stable cardiopulmonary findings.   2.  See comments above regarding the right-sided PICC position.      DX-CHEST-PORTABLE (1 VIEW)   Final Result      Stable chest findings compared to 5/5.      DX-CHEST-PORTABLE (1 VIEW)   Final Result      Stable chest findings compared with 5/3.      DX-ABDOMEN FOR TUBE PLACEMENT   Final Result         Feeding tube with tip projecting over the expected area of the stomach.      DX-CHEST-PORTABLE (1 VIEW)   Final Result      Stable chest findings compared with prior.      DX-CHEST-PORTABLE (1 VIEW)   Final Result         1. No significant interval change.      US-EXTREMITY ARTERY LOWER UNILAT RIGHT   Final Result      DX-CHEST-PORTABLE (1 VIEW)   Final Result         1. No significant interval change.      IR-PICC LINE PLACEMENT W/ GUIDANCE > AGE 5   Final Result                  Ultrasound-guided PICC placement performed by qualified nursing staff as    above.          DX-CHEST-PORTABLE (1 VIEW)    Final Result         1.  Pulmonary edema and/or infiltrates, similar to prior study.   2.  Stable opacification right lung, likely effusion. Thoracostomy tubes are in place.   3.  Multiple cavitary appearing left pulmonary lesions, see CT performed April 27, 2021 for further characterization      DX-CHEST-PORTABLE (1 VIEW)   Final Result         1.  Pulmonary edema and/or infiltrates, similar to prior study.   2.  Single opacification right lung, likely effusion. Thoracostomy tubes are in place.   3.  Multiple cavitary appearing left pulmonary lesions, see CT performed April 27, 2021 for further characterization   4.  Soft tissue gas in the right chest wall      DX-CHEST-PORTABLE (1 VIEW)   Final Result         1.  Pulmonary edema and/or infiltrates, similar to prior study.   2.  Increased opacification right lung, likely increased effusion. Thoracostomy tubes are in place.   3.  Multiple cavitary appearing left pulmonary lesions, see CT performed April 27, 2021 for further characterization   4.  Soft tissue gas in the right chest wall      DX-CHEST-PORTABLE (1 VIEW)   Final Result         1.  Pulmonary edema and/or infiltrates, similar to prior study.   2.  Right pneumothorax, stable compared to prior study, right thoracostomy tubes remain in place   3.  Multiple cavitary appearing left pulmonary lesions, see CT performed April 27, 2021 for further characterization   4.  Soft tissue gas in the right chest wall      DX-CHEST-PORTABLE (1 VIEW)   Final Result         1.  Pulmonary edema and/or infiltrates, similar to prior study.   2.  Right pneumothorax, interval decreased compared to prior study, interval placement of right thoracostomy tubes is noted.   3.  Multiple cavitary appearing left pulmonary lesions, see CT performed yesterday for further characterization   4.  Soft tissue gas in the right chest wall      DX-CHEST-PORTABLE (1 VIEW)   Final Result         1.  Pulmonary edema and/or infiltrates, similar to  prior study.   2.  Right pneumothorax, stable compared to prior study, interval removal of right thoracostomy tube is noted.   3.  Multiple cavitary appearing left pulmonary lesions, see CT performed yesterday for further characterization   4.  Soft tissue gas in the right chest wall      DX-CHEST-LIMITED (1 VIEW)   Final Result         No significant interval change.      CT-CTA CHEST PULMONARY ARTERY W/ RECONS   Final Result         No CT evidence of pulmonary embolus.      Previous right chest tube were removed.      Grossly similar in size of the loculated right hydropneumothorax but there is increased layering fluid component. Unchanged mild shift of the mediastinal to the left.      Redemonstration of a pigtail catheter in the medial left lung base. Grossly similar multiple cavitary lesions in the left lung.      US-EXTREMITY VENOUS LOWER BILAT   Final Result      POCT BUTTERFLY-DUPLEX SCAN EXTREMITY VEINS LIMITED   Final Result      DX-CHEST-PORTABLE (1 VIEW)   Final Result         1.  Pulmonary edema and/or infiltrates, similar to prior study.   2.  Right pneumothorax, somewhat decreased to prior study, interval removal of right thoracostomy tube is noted.   3.  Multiple cavitary appearing left pulmonary lesions, see CT performed yesterday for further characterization   4.  Soft tissue gas in the right chest wall      DX-CHEST-PORTABLE (1 VIEW)   Final Result         1.  Pulmonary edema and/or infiltrates, similar to prior study.   2.  Right pneumothorax, similar to prior study with thoracostomy tube in place.   3.  Multiple cavitary appearing left pulmonary lesions, see CT performed yesterday for further characterization      CT-CHEST (THORAX) W/O   Final Result      Large loculated right hydropneumothorax with interval increase in size of the pneumothorax and pleural fluid.      Right chest tube is in the posterior right thorax.      There is mediastinal shift to the left compatible with tension.       Small pericardial effusion.      Small loculated left pleural effusion with overlying atelectasis/consolidation.   Pigtail catheter is seen at the medial left lung base. Pleural effusion has decreased in size compared to prior.      There are multiple foci of air extending from the right lung to the pleural space suspicious for a bronchopleural fistula.      Multiple cavitary lesions bilaterally with mild interval decrease of the solid component of the cavitary nodules.      Noncavitary 3.1 cm masslike density is seen at the left lung base.      Findings may be related to septic emboli, malignancy or multifocal abscesses. Short interval follow-up recommended.      Soft tissue air on the right is again seen.      Splenomegaly      Findings discussed with Leti Sun.      DX-CHEST-PORTABLE (1 VIEW)   Final Result      No significant change from prior exam.      CT-HEAD W/O   Final Result      1.  RIGHT frontal ventriculostomy catheter unchanged in position.   2.  Mild cortical atrophy.   3.  No intracranial hemorrhage.   4.  Apparent dural thickening overlying the clivus.  Consider further evaluation with contrast-enhanced MRI.      DX-CHEST-LIMITED (1 VIEW)   Final Result      Interval left basilar pigtail catheter with diminished atelectasis, pleural fluid      Stable right hydropneumothorax with thoracostomy tube in place, considerable soft tissue gas and partial lung collapse      IR-CHEST TUBE-EMPYEMA LEFT   Final Result      1.  CT GUIDED LEFT  10 [place] PIGTAIL CHEST TUBE PLACEMENT FOR POSSIBLE EMPYEMA YIELDING OLD BLOODY FLUID.      2. A CHEST RADIOGRAPH IS FORTHCOMING.      EC-MICAH W/O CONT   Final Result      US-ABDOMEN LTD (SOFT TISSUE)   Final Result      No fluid seen surrounding the electronic implanted spinal stimulator device.      DX-CHEST-LIMITED (1 VIEW)   Final Result      1.  Large right hydropneumothorax with right-sided chest tube in place, likely stable to slightly decreased from prior  study.   2.  Small left pleural effusion. Mild improved aeration in the left lung.   3.  Bilateral pulmonary opacities which could be related to combination of atelectasis, edema and/or infection..      CT-CTA HEAD WITH & W/O-POST PROCESS   Final Result      1.  Patent carotid and vertebral arteries.      2.  Again seen right frontal the jugular peritoneal shunt catheter. There is mild increase in volume of the lateral and third ventricles.      CT-CHEST (THORAX) W/O   Final Result      1.  Interval placement of a right-sided chest tube into a large loculated right-sided pleural effusion. There is now seen a large right-sided hydropneumothorax in the area that previously seen fluid. The chest tube extends into that hydropneumothorax.    There is some residual pleural fluid seen as well. A hydropneumothorax is somewhat loculated with components most prominently inferiorly and medially.      2.  New loculated left pleural effusion.      3.  Again seen multiple bilateral cavitary pulmonary masses which are more prominent than previously seen with increasing cavitation and measure up to 3 cm size. Differential diagnosis includes septic emboli, multifocal abscesses and neoplasm.      4.  Large amount of subcutaneous emphysema on the right.      5.  Splenomegaly.      6.  Multiple support devices.      Fleischner Society pulmonary nodule recommendations:         DX-CHEST-LIMITED (1 VIEW)   Final Result      1.  Support devices as described above.      2.  Right chest tube present with a again seen right-sided pneumothorax, possibly increased in size and loculated.      3.  Worsening bilateral pulmonary infiltrates.      DX-ABDOMEN FOR TUBE PLACEMENT   Final Result      Cortrak feeding tube tip projects in the region of the duodenal bulb.      DX-CHEST-LIMITED (1 VIEW)   Final Result      Loculated right pneumothorax is decreased in size compared to prior.      Small left pleural effusion.      Small loculated right  pleural effusion.      Extensive bilateral airspace opacities are not significantly changed.      Soft tissue air on the right is again noted.      DX-CHEST-LIMITED (1 VIEW)   Final Result      Decreased partially loculated right pleural fluid with increased pleural air. Right chest tube is in place.      Increased hazy opacity in the left lung possibly atelectasis.         US-ABDOMEN LTD (SOFT TISSUE)   Final Result      No drainable fluid collection.      DX-CHEST-PORTABLE (1 VIEW)   Final Result      Decreased partially loculated right pleural fluid with increased pleural air. Right chest tube is in place.      No other significant change.      DX-CHEST-PORTABLE (1 VIEW)   Final Result         1.  Pulmonary edema and/or infiltrates are identified, which are somewhat decreased since the prior exam.   2.  Moderate loculated appearing right pleural effusion, slightly decreased since prior      DX-ABDOMEN FOR TUBE PLACEMENT   Final Result      Feeding tube tip at the distal stomach.      DX-CHEST-PORTABLE (1 VIEW)   Final Result         1.  New chest tube is noted in the right lower hemithorax.      2.  Right pleural effusion is again identified which appears similar to the prior exam.      3.  No new infiltrates or consolidations are identified.      DX-CHEST-PORTABLE (1 VIEW)   Final Result         1.  Pulmonary edema and/or infiltrates are identified, which are stable since the prior exam.   2.  Moderate loculated appearing right pleural effusion      DX-CHEST-PORTABLE (1 VIEW)   Final Result         No significant interval change.      POCT BUTTERFLY-ECHOCARDIOGRAM LTD W/O CONT    (Results Pending)        Assessment/Plan  * Acute bacterial endocarditis- (present on admission)  Assessment & Plan  Mitral valve, tricuspid valve  MSSA  Continue Ancef through 6/14/2021    Pulmonary abscess (HCC)  Assessment & Plan  From septic emboli  Now s/p decortication and thoracotomy     IR for draining tube placed with 10 cc  bloody fluid draining out, sent to lab for analysis, low likelihood of infection vs high likelihood of post surgical hemothorax.  Resume eliquis after    Vaginal itching  Assessment & Plan  Topical Chlortrimazole    Debility  Assessment & Plan  Acute on chronic  Recovering well: may need rehab vs DC with HH if she cont's to do well    Elevated alkaline phosphatase level- (present on admission)  Assessment & Plan  The patient not able to tolerate a MRCP at this time, ultrasound grossly unremarkable, observe    Agitation requiring sedation protocol- (present on admission)  Assessment & Plan  resolved    Goals of care, counseling/discussion  Assessment & Plan  Patient with overall high risk of morbidity and mortality given her gravely ill state and prior chronic medical conditions  Palliative care following    Spinal cord stimulator status- (present on admission)  Assessment & Plan  Patient's stimulator is Nuvectra. It is FDA approved as MRI compatible, but the company has gone out of business, so there is no support team to assist with MRI.  Thus, if MRI were performed, our radiologists would need to protocol it correctly to manage the stimulator. The former rep from [employer] is Andre, [phone].  Information obtained from Dr. Mahoney's office, [phone].     Per  (5/4/2021), it is not MRI compatible unfortunately.     Thrombocytosis (HCC)  Assessment & Plan  Acute reaction since resolved    Anasarca- (present on admission)  Assessment & Plan  Improved    Pleural effusion, left  Assessment & Plan  resolved    Trapped lung  Assessment & Plan  Now s/p decortication   Chest tubes removed on 5/26, observe    Fever  Assessment & Plan  Resolved, monitor, antibiotic therapy    Pneumonia  Assessment & Plan  MSSA  Ancef per ID through 6/14  Repeat CT Chest showing some residual empyema: have DW ID and will plan to tap.  Prefer not to go back to the OR given the pt's prolonged and complex hospital course    History  of vasculitis- (present on admission)  Assessment & Plan  Does not appear to be a current issue    Bacteremia due to methicillin susceptible Staphylococcus aureus (MSSA)- (present on admission)  Assessment & Plan  Ancef through 6/14    CSF pleocytosis- (present on admission)  Assessment & Plan  Infectious disease managing    Empyema of right pleural space (HCC)- (present on admission)  Assessment & Plan  Patient s/p multiple chest tubes, thoracotomy and decortication  Now s/p second decortication 5/17  Per thoracic surgery note, sutures to be removed on 6/9  Plan Abx's through 6/14     IR for draining tube placed with 10 cc bloody fluid draining out, sent to lab for analysis, low likelihood of infection vs high likelihood of post surgical hemothorax.     Acute blood loss anemia  Assessment & Plan  Likely secondary to hemothorax  300 cc of blood removed after chest tube placed at [facility]  Hb has been stable around 9-10    Prolonged Q-T interval on ECG- (present on admission)  Assessment & Plan  Resolved after correcting metabolic issues  Cont to follow   Monitor electrolytes, acidosis etc    Acute respiratory failure with hypoxia (HCC)- (present on admission)  Assessment & Plan  Secondary to MSSA bacteremia, empyema, pulmonary septic emboli  resolved    History of complicated migraines- (present on admission)  Assessment & Plan  Monitor    GERD (gastroesophageal reflux disease)- (present on admission)  Assessment & Plan  History of, as needed treatment    Hyponatremia  Assessment & Plan  Mild - asymptomatic  Monitor    Tachycardia  Assessment & Plan  Persistent, likely secondary with multiple underlying ongoing conditions  Monitor  Slowly trending down; now 80-90s    Pseudotumor cerebri  Assessment & Plan  History of  shunt, neurosurgery opted against removal    H/O: CVA (cerebrovascular accident)- (present on admission)  Assessment & Plan  Monitor    Chronic pain syndrome- (present on  admission)  Assessment & Plan  Pre-existing, pain management    Port or reservoir infection  Assessment & Plan  S/p removal    Thrombocytopenia (HCC)- (present on admission)  Assessment & Plan  Transient, resolved    Anemia- (present on admission)  Assessment & Plan  Monitor, transfuse as indicated    Septic shock (HCC)- (present on admission)  Assessment & Plan  Sepsis is resolved    Sepsis (HCC)  Assessment & Plan  Sepsis has resolved    Hypomagnesemia  Assessment & Plan  Repeat lab tomorrow    Hypokalemia  Assessment & Plan  Monitor, replace and recheck    Anxiety- (present on admission)  Assessment & Plan  Continue Cymbalta and Paxil  As needed Valium    S/P  shunt- (present on admission)  Assessment & Plan  History of, Dr. Oh felt there is currently no need to remove    Folliculitis  Assessment & Plan  Improved    Acute encephalopathy- (present on admission)  Assessment & Plan  Per neurosurgery no need to remove  shunt  Improved         VTE prophylaxis: enoxaparin on hold for IR drain  scd

## 2021-06-17 NOTE — PROGRESS NOTES
Pt alert and oriented x4, on 1 lpm oxygen 95% via NC. PICC line in lace, red line on TKO, purple flushing well. Continued on abx therapy. Complained of pain 7/10, prn pain medication administered per MAR. Pt able to ambulate up to bathroom. Fall precautions in place. All needs attended.    Assessment/description of ears? Intact and blanching  Which preventative measures are in place for the ears? Gray foam and cheek pads, reminded to remove glasses when sleeping    Assessment/description of elbows? Intact and blanching  Which preventative measures are in place for the elbows? encouraged to turn frequently    Assessment/description of sacrum? Pink/red rashes  Which preventative measures are in place for the sacrum? Barrier cream, waffle overlay, pillows for support and positioning, ambulates up to bathroom, encouraged to turn frequently    Assessment/description of heels? Intact and blanching  Which preventative measures are in place for the heels? Pillows for offloading, waffle mattress, encouraged to turn frequently    Which devices are in place? PICC, NC  Description of skin under devices: intact and blanching  Which preventative measures are in place under devices? Gray foam pads and cheek pads    Other: R chest tube drain incision site, DTI on R great toe, healed wound on Left middle toe. Dressing on neck.

## 2021-06-17 NOTE — PROGRESS NOTES
Infectious Disease Progress Note    Author:Dixie Albarran M.D. Date & Time created: 6/17/2021  12:58 PM    Interval History:  Cefazolin 5/18-19, 5/26-present Target 6/14/21  Total abx therapy D#62     Thoracoscopy & Decortication - 4/28  Decortication 5/17  R chest tube 6/15     PRIOR Rx:   Zosyn 4/22-4/23  Previous: Unasyn, Zosyn, Vanco, Daptomycin  Ceftaroline: start 4/13-4/15  Nafcillin 2g Q4 hrs 4/15-4/23      Meropenem 5/19-5/26   Cefepime 4/23 5/18 4/14: Unable to give name of previous NSG or neurologist. Seems confused, but able to say some sentences fluently.   4/15: Line cultures now growing MSSA. As well as from the blood. Repeat blood culture 4/13+ in both sets. Patient has worsening confusion. CSF fluid analyses suggest pleocytosis. Cultures are no growth from the CSF. Chest CT was ordered this morning indicating a loculated right pleural effusion as well as bilateral septic emboli. Dr. Mack spoke with Dr. Oh yesterday and no surgical intervention unless clinical deterioration.  4/16: Increased respiratory distress.  Tachypneic.  CT with loculated collection.  Dr Resendiz not available.  No thoracic surgeon available.  Plans to have pulmonary place CT.  Transferring to ICU.  4/17: Transferred to [facility] last night. Hgb dropped to 6.4 requiring PRBC transfusion. Requiring 2 pressors. Fio2 50%. Febrile 38.8. Pending OR decortication of empyema and hemothorax. [facility] at [facility] shows 8,371 WBC, ,000, 74% N  4/18: Chest tube was upsized by surgery yesterday, no decortication. WBC 22k. Fio2 40%. Vasopressin. Levophed down to 2mcg. Afebrile 24 hrs. Last fever 38.6 on 4/16.   4/19: Still on vent. Levo 3 mcg, vasopressin. Afebrile 72 hours. WBC 21k. BC 4/17 NGTD x 48 hours. Unable to do MRI brain given neurostimulator per RN.   4/20: Remaining afebrile. Hb 6.2 and undergoing transfusion today.WBC 24,100, 5% bands.1700 ml CT output. Copious bloody ET secretions. No pressors. Receiving  dornase and TPA per CT. Right pleural effusion not large per CXR today.   4/21: WBC 31k. Bronchoscopy yesterday showing blood. Cultures sent. TPA on hold per RN due to arvind blood from chest tube requiring PRBC transfusion for hgb 6. Pending chest CT today. Per , spinal stimulator is non-MRI compatible. Levophed 10mcg. 30% Fio2. Afebrile.   4/22: Fever 101.3 this am. WBC 20,300 down from 32,200 yesterday. BAL growing Klebsiella pneumoniae but susceptibility panel not set up until today. CTA of brain shows no lesion. CT of chest shows enlarging cavitary lung lesions bilat and large loculated new left pleural effusion and large hydropneumothorax on right. TPA & dornase infusions of right chest ended 4/20 after considerable bleeding requiring transfusion. Hb now down again to 6.8.  4/23: WBC 23k. Fio2 40%. Tmax 38.1. US of stiumlator shows small fluid collection but no drainable abscess. Pending MICAH today. Pending L chest tube placement  4/24: Continue fever 100.8-101.8. WBC 16,600. MICAH shows large vegetations on both tricuspid valve and mitral valve with mild TR & MR.  No aortic root abscess. Left chest tube placed yesterday yielding 8 ml of old bloody fluid. Bronch today revealed copious thick maroon secretions in distal trachea and both mainstem bronchi. On the right these were obstructive. Remaining off pressors. Last positive blood cultures (MSSA) were 4/16, negative 4/17.  4/25: Fever 100.6 this AM. wBC 13,300. Hb 6.6. CT: right hydropneumothorax increasing, right bronchopleural fistula, mediastinal shift ot the left , multiple cavitary pulmonary nodules, 3.1 cm left basilar mass, splenomegaly. CT of head shows dural thickening over the clivus. Lac+ GNR >100,000 col/ml growing from BAL of 4/24, presumed Klebsiella. Left peural fluid culture negative from 4/23.  4/26: Fever 100.4 last night. WBC 13,500. Hb 7.4. Plts 502,000. ESR >120. UA fairly clear except 30 mg% protein, 2-5 wbc and rare rbc. CXR  unchanged except more prominent pulmonary edema. GNR in BAL may be a different species of Klebsiella, and resistant to ampicillin & tmp-sulfa; confirmation pending.  4/28: Fever 100.8 last night. WBC 27,700. Hb 6.6. Plts 482,000. Creat 0.19. Kleb pneum in BAL on 4/24 is resistant to ampicillin & tmp-sulfa but otherwise sensitive. O2 sat 96% on FIO2 30% now, PEEP 8. Has been re-evaluated by thoracic surgeon, Dr. Ganser, who believes she will not wean without decortication on the right. Surgery anticipated today.  4/29: S/P right thoracoscopy with decortication with cultures NG at 24 hrs.  4/30: Had a bronch due to hypoxia and hypotension. CXR with worsening right hemithorax pulmonary opacities. Bloody mucous plugs removed. Pressors started. Febrile this am. Tachy in 130s. Pressors just removed. Tolerating vent, but not a SBT candidate at the moment.  5/1: Small air leak CT-2 every ~ 2/3 breathes. The K. pneumoniae from her thoracoscopy is sensitive to her cefepime so no antibiotic change needed.   5/2: No air leak today she tolerated 2  hrs of spontaneous breathing with support today.      5/3: Second day with SBT and doing well now for 2 hrs. Slight bump in temperature and      WBC's but no obvious clinical infectious changed so watch closely.      5/4: Fever still from time to time. WBCs trending up. Chest tubes in place. Trach.       5/5: She clearly is better today she was sitting up in bed with open eyes and follows commands. She still has low grade fever but her WBC's are dropping. Cefepime dose increased yesterday for increased CNS coverage if necessary. She will probably need further thoracic surgery as mentioned by Dr. Ganser. The issue of what to do with her stimulator is still up in the air for now as it probably will need to be removed but she is nort a candidate for more major surgery at this time.       5/7: She continues to improve and under go longer SBT trials. She just had a new PICC placed in her  right arm and plan is to D/C central line.        5/8: PICC placed. No fevers. Comfortable. Cortrak placed. Failing SBTs.  5/9: No acute issues. No fevers. Comfortable. Family at bedside.   5/10: No acute issues, fevers or WBC bumps. No changes in condition. She is to get her CT scan today and be reviewed by surgery  5/11:  at bedside.  Has been referred to LANE.  Repeat CT scan completed.  Results noted.  ? If Dr Ganser has seen.  Still with an empyema:  ? If candidate for further surgery.  5/12: Pending next thoracic surgery. No fever. Anxious.  5/13: ? Surgery date.  No one seems to know.  Had speaking valve in and having swallow eval with speech therapy  5/14: No acute issues. No fevers. Surgery Monday.  5/15: One CT accidentally pulled out yesterday.  Has been on T piece and tolerating. Plans for surgery Monday afternoon.  5/16: On schedule for surgery 5/17: 4:30 pm.  Moved to room T614.  No new issues.  5/17: Was sitting up in the bedside chair since change of shift.  Now walking in hallways with RN and CNA.  5/18: Decortication yesterday with 2 chest tube placement. WBC improving 21->14k  5/19: Hypotension and tachycardia.  Decreased H/H.  Placed back on vent to rest.  Dr Lozano in process of placing new line.  Antibiotics were deesculated yesterday to Cefazolin.  No cx were taken at surgery.  5/20: Pt was febrile yest afternoon 38.2, but now afebrile overnight after meropenem. WBC improved 11k->8k. Fio2 30%. Phenylephrine now off this morning. CXR shows new R consolidations.   5/21: Off pressors.  On vent: FiO2 30%, PEEP 8, PS 5 Received pRBCs yesterday.  5/22: Remains afebrile, off pressors. FiO2 30%.   5/23: Afebrile. FiO2 30%. Bronchoscopy yesterday normal.   5/24: No new cultures obtained at last bronchoscopy.  On  T piece this am: FiO2 30%.  5/25: Remaining afebrile. WBC 8600.   5/26: Chest tube removed today. Possible transfer to [facility]  5/27: O2 sat usuallyl 95% but intermittent desat to 87-89%.   Remaining afebrile.WBC 6100. Alk phos 641 but normal transaminases.   5/28: Pending \[facility]"" transfer. NO new issues overnight  5/29: Awaiting bed at \[facility]\"". Remaining afebrile. WBC 5800, creat 0.19.  5/31. Remaining afebrile. WBC 6000. . Remains weak and frail. Alk Phos 694 with normal transaminases. Slightly improved bilateral patchy infiltrates.   6/1: She is anxious to move on but still waiting for insurance approval to \[facility]\"".  6/2: Still awaiting bed at \[facility]\"". Remaining afebrile. WBC 8300. Alk Phos 705 but transaminases normal and bilirubine 0.4.  6/4: Awaiting insurance auth before bed can be assigned at \[facility]\"". Remaining afebrile. Extensive folliculitis over back and buttocks.  WBC 8300 on 6/2. Last  on 5/31.  6/5: Her ESR is down to 53 from 102 a good sign hopefully. She continues to work on her strength by walking.  6/6: Speech evaluation to check her swallow as she is stronger and can't stand pureed.  6/7: \[facility]\"" was denied by insurance. Could she go to full rehab now? Swallow eval pending.clotrimazole for vaginal drainage and itch.  6/8: Continued to be declined by insurances. No issues clinically. Physical Med declined for rehab. No fevers.  6/9: OT will recommend home PT/OT for her once he finishes her antibiotics.  6/10: Per pt and CM and OT, patient does not need SNF anymore may go straight home.   6/11: Her repeat chest CT shows an 8X6 fluid collection we will see if IR can drain the fluid before she is D/C'd.  6/12: Pulm recommends CT surgery evaluation. This is pending.   6/13: CT surgery recommends IR drain placement. Pending.   6/15: Just back from IR.  CT placed into right chest.  5 cc of thick bloody fluid obtained and sent to lab.   at bedside. States she walked six laps yesterday.  6/16: Chest tube placed. Only minimal output. Possible chest tube removal this afternoon by pulmonology   6/17: CT now removed.  Pending authorization for OPAT.  Orders were given to [facility].  CM had no  idea.  Review of Systems:  Review of Systems   All other systems reviewed and are negative.      Physical Exam:  Physical Exam  Constitutional:       Appearance: Normal appearance. She is obese.   HENT:      Head: Normocephalic.   Cardiovascular:      Rate and Rhythm: Normal rate and regular rhythm.   Pulmonary:      Effort: Pulmonary effort is normal. No respiratory distress.      Comments: Diminished in R base.  Musculoskeletal:      Comments: R PICC intact   Skin:     General: Skin is warm and dry.   Neurological:      Mental Status: She is alert and oriented to person, place, and time.         Labs:  No results found for this or any previous visit (from the past 24 hour(s)).  Results     Procedure Component Value Units Date/Time    FLUID CULTURE W/GRAM STAIN [[id number]]  (Abnormal) Collected: 06/15/21 1005    Order Status: Completed Specimen: Body Fluid Updated: 06/17/21 1153     Significant Indicator POS     Source BF     Site Pleural Fluid     Culture Result Growth noted after further incubation, see below for  organism identification.       Gram Stain Result Many WBCs.  No organisms seen.       Culture Result Staphylococcus epidermidis  Rare growth  Susceptibilities in progress      Fungal Culture [[id number]] Collected: 06/15/21 1005    Order Status: Completed Specimen: Body Fluid Updated: 06/17/21 1153     Significant Indicator NEG     Source BF     Site Pleural Fluid     Culture Result Culture in progress.    GRAM STAIN [[id number]] Collected: 06/15/21 1005    Order Status: Completed Specimen: Body Fluid Updated: 06/15/21 1819     Significant Indicator .     Source BF     Site Pleural Fluid     Gram Stain Result Many WBCs.  No organisms seen.      FUNGAL CULTURE [[id number]]     Order Status: No result Specimen: Respirate from Other Body Fluid     FLUID CULTURE W/GRAM STAIN [[id number]]     Order Status: No result Specimen: Body Fluid from Pleural Fluid     FLUID CULTURE W/GRAM STAIN [[id number]]     Order  Status: No result Specimen: Body Fluid from Thoracentesis Fluid         Hemodynamics:  Temp (24hrs), Av.2 °C (97.2 °F), Min:36.1 °C (97 °F), Max:36.6 °C (97.9 °F)  Temperature: 36.1 °C (97 °F)  Pulse  Av.7  Min: 40  Max: 220   Blood Pressure: (!) 84/50 (rn notified )    PICC Double Lumen 21 Lumen 1: Purple Lumen 2: Red Right Brachial (Active)   Site Assessment Clean;Dry;Intact 21   Lumen 1 Status Scrubbed the hub prior to access;Flushed 21   Lumen 2 Status Scrubbed the hub prior to access;Infusing 21   Line Secured at (cm) 1 cm 21   Extremity Circumference (cm) 30.5 cm 21 111   Dressing Type Biopatch;Occlusive;Securing device;Transparent 21   Dressing Status Clean;Dry;Intact 21   Dressing Intervention N/A 21   Dressing Change Due 21 1200   Date Primary Tubing Changed 21 1200   Date Secondary Tubing Changed 06/16/21 06/16/21 2000   Date IV Connector(s) Changed 21 1200   NEXT Primary Tubing Change  21 1200   NEXT Secondary Tubing Change  21 1200   NEXT IV Connector(s) Change 21 1200   Line Necessity Assessed Antibiotic Therapy Greater than 7 Days 21   $ Double Lumen PICC Charge Single kit used 21 1118     Wound:  @WOUNDLDA(4)@     Fluids:  Intake/Output                             06/15/21 0700 - 2159 21 - 2159 21 - 21 0659     0516-6080 2137-8995 Total 3625-1932 8153-8421 Total 1574-1123 7629-2866 Total                    Intake    P.O.  --  240 240  760  390 1150  --  -- --    P.O. -- 240 240  -- -- --    IV Piggyback  --  100 100  100  -- 100  --  -- --    Volume (mL) (ceFAZolin in dextrose (ANCEF) IVPB premix 2 g) -- 100 100 100 -- 100 -- -- --    Total Intake -- 340 340  -- -- --       Output    Urine  --  -- --  --  -- --  --  -- --     Number of Times Voided -- 3 x 3 x -- -- -- -- -- --    Stool  --  -- --  --  -- --  --  -- --    Number of Times Stooled 1 x -- 1 x 1 x -- 1 x -- -- --    Chest Tube  10  -- 10  --  -- --  --  -- --    Output (mL) ([REMOVED] Chest Tube Right Pleural) 10 -- 10 -- -- -- -- -- --    Total Output 10 -- 10 -- -- -- -- -- --       Net I/O     -10 340 330  -- -- --           GI/Nutrition:  Orders Placed This Encounter   Procedures   • Diet Order Diet: Level 6 - Soft and Bite Sized; Liquid level: Level 2 - Mildly Thick     Standing Status:   Standing     Number of Occurrences:   1     Order Specific Question:   Diet:     Answer:   Level 6 - Soft and Bite Sized [23]     Order Specific Question:   Liquid level     Answer:   Level 2 - Mildly Thick     Medications:  Current Facility-Administered Medications   Medication Last Admin   • diphenhydrAMINE (BENADRYL) tablet/capsule 25 mg     • ceFAZolin in dextrose (ANCEF) IVPB premix 2 g Stopped at 06/17/21 0521   • diphenhydrAMINE (BENADRYL) injection 50 mg 50 mg at 06/17/21 1116   • acetaminophen (TYLENOL) tablet 500 mg 500 mg at 06/17/21 0911   • divalproex (DEPAKOTE SPRINKLE) capsule 500 mg 500 mg at 06/17/21 0451   • DULoxetine (CYMBALTA) capsule 60 mg 60 mg at 06/17/21 0451   • gabapentin (NEURONTIN) capsule 300 mg 300 mg at 06/17/21 0451   • methadone (DOLOPHINE) tablet 30 mg 30 mg at 06/17/21 0452   • PARoxetine (PAXIL) tablet 10 mg 10 mg at 06/17/21 0452   • risperiDONE (RISPERDAL) tablet 2 mg 2 mg at 06/17/21 0452   • senna-docusate (PERICOLACE or SENOKOT S) 8.6-50 MG per tablet 2 tablet 2 tablet at 06/16/21 0411    And   • polyethylene glycol/lytes (MIRALAX) PACKET 1 Packet      And   • magnesium hydroxide (MILK OF MAGNESIA) suspension 30 mL      And   • bisacodyl (DULCOLAX) suppository 10 mg     • acetaminophen (Tylenol) tablet 650 mg 650 mg at 05/30/21 0457   • diazePAM (VALIUM) tablet 5 mg 5 mg at 06/17/21 0007   • oxyCODONE immediate-release (ROXICODONE)  tablet 5 mg 5 mg at 06/17/21 1016   • enoxaparin (LOVENOX) inj 40 mg 40 mg at 06/17/21 0453   • Respiratory Therapy Consult     • ondansetron (ZOFRAN) syringe/vial injection 4 mg 4 mg at 06/13/21 1349     Medical Decision Making, by Problem:  Active Hospital Problems    Diagnosis    • *Acute bacterial endocarditis [I33.0]    • Vaginal itching [N89.8]    • Debility [R53.81]    • Elevated alkaline phosphatase level [R74.8]    • Agitation requiring sedation protocol [R45.1]    • Spinal cord stimulator status [Z96.89]    • Goals of care, counseling/discussion [Z71.89]    • Fever [R50.9]    • Pulmonary abscess (HCC) [J85.2]    • 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0]    • Pseudotumor cerebri [G93.2]    • H/O: CVA (cerebrovascular accident) [Z86.73]    • Chronic pain syndrome [G89.4]    • Port or reservoir infection [T80.212A]    • Thrombocytopenia (HCC) [D69.6]    • Septic shock (HCC) [A41.9, R65.21]    • Anemia [D64.9]    • Sepsis (HCC) [A41.9]    • Hypokalemia [E87.6]    • Hypomagnesemia [E83.42]    • S/P  shunt [Z98.2]    • Anxiety [F41.9]    • Folliculitis [L73.9]    • Acute encephalopathy [G93.40]        Plan:  -Severe sepsis  -Catheter related bloodstream infection due to infected port status post removal 4/12-staph aureus  -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   - Acute left empyema s/p chest tube placement 4/23, decortication 5/17, chest tube removal 5/26 - Klebsiella  -Multiple acute septic pulmonary emboli bilaterally  -Acute bilateral pneumonia due to above  --Pulmonary edema  -Pseudotumor cerebri with underlying  shunt: possible arachnoiditis  -Chronic migraine headaches  -History of autoimmune vasculitis  -Elevated alkaline phosphatase & bilirubin  -Acute encephalopathy due to sepsis  - Anemia due to above  - Acute fever on 5/19 likely from secondary VAP  - Secondary VAP R sided pneumonia  - decubitus ulcer sacral.   - R lung abscess 8x6 cm s/p IR chest tube 6/15 - cultures NGTD     Plan   - New CT placed in the right chest 6/15. Minimal output. Primarily bloody on analysis. WBC 6897, RBC 07845, 100% polys. Cultures NGTD, but has been on abx. Likely the same staph or klebsiella.   - CT was removed.    - new 4 week target date 7/12/21 from chest tube placement  - Will need suppression after therapy for her HW to include spinal stimulator,  shunt. Given MSSA - Keflex appropriate     Dispo: Pt has Medicare/[employer]. Does not have transportation but can make arrangements to followup weekly in [facility]. [facility] infusion approved, using ertapenem 1gm Q 24 hours.   Orders were given to [facility] by Dr Cox.  CM not aware, but discussed with them on CM round this am.  [facility] will arrange followup visit next week    Anticipate discharge soon in next 48 hrs if chest tube is removed    Discussed with pt and CM today      30 min spent with 50% face to face contact coordinating care/counseling.

## 2021-06-17 NOTE — DISCHARGE PLANNING
Agency/Facility Name: [facility]  Spoke To: Lluvia  Outcome: Referral was not received.  DPA faxed to an alternate fax #[phone] and printed and manually faxed to [phone].  Referral was 167 pages.  Faxed pages [phone], , [phone].

## 2021-06-18 ENCOUNTER — PATIENT OUTREACH (OUTPATIENT)
Dept: HEALTH INFORMATION MANAGEMENT | Facility: OTHER | Age: 51
End: 2021-06-18

## 2021-06-18 ENCOUNTER — PHARMACY VISIT (OUTPATIENT)
Dept: PHARMACY | Facility: MEDICAL CENTER | Age: 51
End: 2021-06-18
Payer: COMMERCIAL

## 2021-06-18 VITALS
HEART RATE: 86 BPM | SYSTOLIC BLOOD PRESSURE: 102 MMHG | WEIGHT: 127.87 LBS | RESPIRATION RATE: 18 BRPM | DIASTOLIC BLOOD PRESSURE: 67 MMHG | OXYGEN SATURATION: 95 % | TEMPERATURE: 97.2 F | BODY MASS INDEX: 22.66 KG/M2 | HEIGHT: 63 IN

## 2021-06-18 LAB
ANION GAP SERPL CALC-SCNC: 10 MMOL/L (ref 7–16)
BACTERIA FLD AEROBE CULT: ABNORMAL
BACTERIA FLD AEROBE CULT: ABNORMAL
BUN SERPL-MCNC: 6 MG/DL (ref 8–22)
CALCIUM SERPL-MCNC: 8.9 MG/DL (ref 8.5–10.5)
CHLORIDE SERPL-SCNC: 95 MMOL/L (ref 96–112)
CO2 SERPL-SCNC: 28 MMOL/L (ref 20–33)
CREAT SERPL-MCNC: 0.23 MG/DL (ref 0.5–1.4)
ERYTHROCYTE [DISTWIDTH] IN BLOOD BY AUTOMATED COUNT: 47.8 FL (ref 35.9–50)
GLUCOSE SERPL-MCNC: 89 MG/DL (ref 65–99)
GRAM STN SPEC: ABNORMAL
HCT VFR BLD AUTO: 36 % (ref 37–47)
HGB BLD-MCNC: 11.4 G/DL (ref 12–16)
MCH RBC QN AUTO: 28.4 PG (ref 27–33)
MCHC RBC AUTO-ENTMCNC: 31.7 G/DL (ref 33.6–35)
MCV RBC AUTO: 89.6 FL (ref 81.4–97.8)
PLATELET # BLD AUTO: 377 K/UL (ref 164–446)
PMV BLD AUTO: 9.5 FL (ref 9–12.9)
POTASSIUM SERPL-SCNC: 4.2 MMOL/L (ref 3.6–5.5)
RBC # BLD AUTO: 4.02 M/UL (ref 4.2–5.4)
SIGNIFICANT IND 70042: ABNORMAL
SITE SITE: ABNORMAL
SODIUM SERPL-SCNC: 133 MMOL/L (ref 135–145)
SOURCE SOURCE: ABNORMAL
WBC # BLD AUTO: 4.4 K/UL (ref 4.8–10.8)

## 2021-06-18 PROCEDURE — 700111 HCHG RX REV CODE 636 W/ 250 OVERRIDE (IP): Performed by: INTERNAL MEDICINE

## 2021-06-18 PROCEDURE — 700111 HCHG RX REV CODE 636 W/ 250 OVERRIDE (IP): Performed by: STUDENT IN AN ORGANIZED HEALTH CARE EDUCATION/TRAINING PROGRAM

## 2021-06-18 PROCEDURE — 99239 HOSP IP/OBS DSCHRG MGMT >30: CPT | Performed by: STUDENT IN AN ORGANIZED HEALTH CARE EDUCATION/TRAINING PROGRAM

## 2021-06-18 PROCEDURE — A9270 NON-COVERED ITEM OR SERVICE: HCPCS | Performed by: STUDENT IN AN ORGANIZED HEALTH CARE EDUCATION/TRAINING PROGRAM

## 2021-06-18 PROCEDURE — 700102 HCHG RX REV CODE 250 W/ 637 OVERRIDE(OP): Performed by: STUDENT IN AN ORGANIZED HEALTH CARE EDUCATION/TRAINING PROGRAM

## 2021-06-18 PROCEDURE — 700105 HCHG RX REV CODE 258: Performed by: INTERNAL MEDICINE

## 2021-06-18 PROCEDURE — 94760 N-INVAS EAR/PLS OXIMETRY 1: CPT

## 2021-06-18 PROCEDURE — 85027 COMPLETE CBC AUTOMATED: CPT

## 2021-06-18 PROCEDURE — 80048 BASIC METABOLIC PNL TOTAL CA: CPT

## 2021-06-18 RX ADMIN — METHADONE HYDROCHLORIDE 30 MG: 10 TABLET ORAL at 04:07

## 2021-06-18 RX ADMIN — OXYCODONE 5 MG: 5 TABLET ORAL at 03:53

## 2021-06-18 RX ADMIN — ENOXAPARIN SODIUM 40 MG: 40 INJECTION SUBCUTANEOUS at 04:07

## 2021-06-18 RX ADMIN — ERTAPENEM 1000 MG: 1 INJECTION INTRAMUSCULAR; INTRAVENOUS at 17:58

## 2021-06-18 RX ADMIN — CEFAZOLIN SODIUM 2 G: 2 INJECTION, SOLUTION INTRAVENOUS at 04:04

## 2021-06-18 RX ADMIN — CEFAZOLIN SODIUM 2 G: 2 INJECTION, SOLUTION INTRAVENOUS at 15:19

## 2021-06-18 RX ADMIN — DULOXETINE HYDROCHLORIDE 60 MG: 60 CAPSULE, DELAYED RELEASE ORAL at 04:07

## 2021-06-18 RX ADMIN — DIPHENHYDRAMINE HYDROCHLORIDE 50 MG: 50 INJECTION INTRAMUSCULAR; INTRAVENOUS at 05:18

## 2021-06-18 RX ADMIN — DIPHENHYDRAMINE HYDROCHLORIDE 50 MG: 50 INJECTION INTRAMUSCULAR; INTRAVENOUS at 11:46

## 2021-06-18 RX ADMIN — DULOXETINE HYDROCHLORIDE 60 MG: 60 CAPSULE, DELAYED RELEASE ORAL at 17:58

## 2021-06-18 RX ADMIN — RISPERIDONE 2 MG: 2 TABLET ORAL at 17:59

## 2021-06-18 RX ADMIN — OXYCODONE 5 MG: 5 TABLET ORAL at 17:58

## 2021-06-18 RX ADMIN — RISPERIDONE 2 MG: 2 TABLET ORAL at 04:07

## 2021-06-18 RX ADMIN — OXYCODONE 5 MG: 5 TABLET ORAL at 11:46

## 2021-06-18 RX ADMIN — ACETAMINOPHEN 500 MG: 325 TABLET ORAL at 09:29

## 2021-06-18 RX ADMIN — GABAPENTIN 300 MG: 300 CAPSULE ORAL at 15:03

## 2021-06-18 RX ADMIN — DIVALPROEX SODIUM 500 MG: 125 CAPSULE ORAL at 04:07

## 2021-06-18 RX ADMIN — DOCUSATE SODIUM 50 MG AND SENNOSIDES 8.6 MG 2 TABLET: 8.6; 5 TABLET, FILM COATED ORAL at 17:59

## 2021-06-18 RX ADMIN — GABAPENTIN 300 MG: 300 CAPSULE ORAL at 04:07

## 2021-06-18 RX ADMIN — PAROXETINE HYDROCHLORIDE 10 MG: 10 TABLET, FILM COATED ORAL at 04:07

## 2021-06-18 RX ADMIN — DIPHENHYDRAMINE HYDROCHLORIDE 50 MG: 50 INJECTION INTRAMUSCULAR; INTRAVENOUS at 17:59

## 2021-06-18 RX ADMIN — DIVALPROEX SODIUM 500 MG: 125 CAPSULE ORAL at 15:03

## 2021-06-18 RX ADMIN — ACETAMINOPHEN 500 MG: 325 TABLET ORAL at 15:04

## 2021-06-18 ASSESSMENT — PAIN DESCRIPTION - PAIN TYPE
TYPE: ACUTE PAIN

## 2021-06-18 NOTE — PROGRESS NOTES
Infectious Disease Progress Note    Author: Marlon Cox D.O. Date & Time created: 6/18/2021  10:14 AM    Interval History:  Cefazolin 5/18-19, 5/26-present Target 6/14/21  Total abx therapy D#63     Thoracoscopy & Decortication - 4/28  Decortication 5/17  R chest tube 6/15     PRIOR Rx:   Zosyn 4/22-4/23  Previous: Unasyn, Zosyn, Vanco, Daptomycin  Ceftaroline: start 4/13-4/15  Nafcillin 2g Q4 hrs 4/15-4/23      Meropenem 5/19-5/26   Cefepime 4/23 5/18 4/14: Unable to give name of previous NSG or neurologist. Seems confused, but able to say some sentences fluently.   4/15: Line cultures now growing MSSA. As well as from the blood. Repeat blood culture 4/13+ in both sets. Patient has worsening confusion. CSF fluid analyses suggest pleocytosis. Cultures are no growth from the CSF. Chest CT was ordered this morning indicating a loculated right pleural effusion as well as bilateral septic emboli. Dr. Mack spoke with Dr. Oh yesterday and no surgical intervention unless clinical deterioration.  4/16: Increased respiratory distress.  Tachypneic.  CT with loculated collection.  Dr Resendiz not available.  No thoracic surgeon available.  Plans to have pulmonary place CT.  Transferring to ICU.  4/17: Transferred to [facility] last night. Hgb dropped to 6.4 requiring PRBC transfusion. Requiring 2 pressors. Fio2 50%. Febrile 38.8. Pending OR decortication of empyema and hemothorax. Chest tube placed at [facility] shows 8,371 WBC, ,000, 74% N  4/18: Chest tube was upsized by surgery yesterday, no decortication. WBC 22k. Fio2 40%. Vasopressin. Levophed down to 2mcg. Afebrile 24 hrs. Last fever 38.6 on 4/16.   4/19: Still on vent. Levo 3 mcg, vasopressin. Afebrile 72 hours. WBC 21k. BC 4/17 NGTD x 48 hours. Unable to do MRI brain given neurostimulator per RN.   4/20: Remaining afebrile. Hb 6.2 and undergoing transfusion today.WBC 24,100, 5% bands.1700 ml CT output. Copious bloody ET secretions. No pressors. Receiving  dornase and TPA per CT. Right pleural effusion not large per CXR today.   4/21: WBC 31k. Bronchoscopy yesterday showing blood. Cultures sent. TPA on hold per RN due to arvind blood from chest tube requiring PRBC transfusion for hgb 6. Pending chest CT today. Per , spinal stimulator is non-MRI compatible. Levophed 10mcg. 30% Fio2. Afebrile.   4/22: Fever 101.3 this am. WBC 20,300 down from 32,200 yesterday. BAL growing Klebsiella pneumoniae but susceptibility panel not set up until today. CTA of brain shows no lesion. CT of chest shows enlarging cavitary lung lesions bilat and large loculated new left pleural effusion and large hydropneumothorax on right. TPA & dornase infusions of right chest ended 4/20 after considerable bleeding requiring transfusion. Hb now down again to 6.8.  4/23: WBC 23k. Fio2 40%. Tmax 38.1. US of stiumlator shows small fluid collection but no drainable abscess. Pending MICAH today. Pending L chest tube placement  4/24: Continue fever 100.8-101.8. WBC 16,600. MICAH shows large vegetations on both tricuspid valve and mitral valve with mild TR & MR.  No aortic root abscess. Left chest tube placed yesterday yielding 8 ml of old bloody fluid. Bronch today revealed copious thick maroon secretions in distal trachea and both mainstem bronchi. On the right these were obstructive. Remaining off pressors. Last positive blood cultures (MSSA) were 4/16, negative 4/17.  4/25: Fever 100.6 this AM. wBC 13,300. Hb 6.6. CT: right hydropneumothorax increasing, right bronchopleural fistula, mediastinal shift ot the left , multiple cavitary pulmonary nodules, 3.1 cm left basilar mass, splenomegaly. CT of head shows dural thickening over the clivus. Lac+ GNR >100,000 col/ml growing from BAL of 4/24, presumed Klebsiella. Left peural fluid culture negative from 4/23.  4/26: Fever 100.4 last night. WBC 13,500. Hb 7.4. Plts 502,000. ESR >120. UA fairly clear except 30 mg% protein, 2-5 wbc and rare rbc. CXR  unchanged except more prominent pulmonary edema. GNR in BAL may be a different species of Klebsiella, and resistant to ampicillin & tmp-sulfa; confirmation pending.  4/28: Fever 100.8 last night. WBC 27,700. Hb 6.6. Plts 482,000. Creat 0.19. Kleb pneum in BAL on 4/24 is resistant to ampicillin & tmp-sulfa but otherwise sensitive. O2 sat 96% on FIO2 30% now, PEEP 8. Has been re-evaluated by thoracic surgeon, Dr. Ganser, who believes she will not wean without decortication on the right. Surgery anticipated today.  4/29: S/P right thoracoscopy with decortication with cultures NG at 24 hrs.  4/30: Had a bronch due to hypoxia and hypotension. CXR with worsening right hemithorax pulmonary opacities. Bloody mucous plugs removed. Pressors started. Febrile this am. Tachy in 130s. Pressors just removed. Tolerating vent, but not a SBT candidate at the moment.  5/1: Small air leak CT-2 every ~ 2/3 breathes. The K. pneumoniae from her thoracoscopy is sensitive to her cefepime so no antibiotic change needed.   5/2: No air leak today she tolerated 2  hrs of spontaneous breathing with support today.      5/3: Second day with SBT and doing well now for 2 hrs. Slight bump in temperature and      WBC's but no obvious clinical infectious changed so watch closely.      5/4: Fever still from time to time. WBCs trending up. Chest tubes in place. Trach.       5/5: She clearly is better today she was sitting up in bed with open eyes and follows commands. She still has low grade fever but her WBC's are dropping. Cefepime dose increased yesterday for increased CNS coverage if necessary. She will probably need further thoracic surgery as mentioned by Dr. Ganser. The issue of what to do with her stimulator is still up in the air for now as it probably will need to be removed but she is nort a candidate for more major surgery at this time.       5/7: She continues to improve and under go longer SBT trials. She just had a new PICC placed in her  right arm and plan is to D/C central line.        5/8: PICC placed. No fevers. Comfortable. Cortrak placed. Failing SBTs.  5/9: No acute issues. No fevers. Comfortable. Family at bedside.   5/10: No acute issues, fevers or WBC bumps. No changes in condition. She is to get her CT scan today and be reviewed by surgery  5/11:  at bedside.  Has been referred to LANE.  Repeat CT scan completed.  Results noted.  ? If Dr Ganser has seen.  Still with an empyema:  ? If candidate for further surgery.  5/12: Pending next thoracic surgery. No fever. Anxious.  5/13: ? Surgery date.  No one seems to know.  Had speaking valve in and having swallow eval with speech therapy  5/14: No acute issues. No fevers. Surgery Monday.  5/15: One CT accidentally pulled out yesterday.  Has been on T piece and tolerating. Plans for surgery Monday afternoon.  5/16: On schedule for surgery 5/17: 4:30 pm.  Moved to room T614.  No new issues.  5/17: Was sitting up in the bedside chair since change of shift.  Now walking in hallways with RN and CNA.  5/18: Decortication yesterday with 2 chest tube placement. WBC improving 21->14k  5/19: Hypotension and tachycardia.  Decreased H/H.  Placed back on vent to rest.  Dr Lozano in process of placing new line.  Antibiotics were deesculated yesterday to Cefazolin.  No cx were taken at surgery.  5/20: Pt was febrile yest afternoon 38.2, but now afebrile overnight after meropenem. WBC improved 11k->8k. Fio2 30%. Phenylephrine now off this morning. CXR shows new R consolidations.   5/21: Off pressors.  On vent: FiO2 30%, PEEP 8, PS 5 Received pRBCs yesterday.  5/22: Remains afebrile, off pressors. FiO2 30%.   5/23: Afebrile. FiO2 30%. Bronchoscopy yesterday normal.   5/24: No new cultures obtained at last bronchoscopy.  On  T piece this am: FiO2 30%.  5/25: Remaining afebrile. WBC 8600.   5/26: Chest tube removed today. Possible transfer to [facility]  5/27: O2 sat usuallyl 95% but intermittent desat to 87-89%.   Remaining afebrile.WBC 6100. Alk phos 641 but normal transaminases.   5/28: Pending [facility] transfer. NO new issues overnight  5/29: Awaiting bed at [facility]. Remaining afebrile. WBC 5800, creat 0.19.  5/31. Remaining afebrile. WBC 6000. . Remains weak and frail. Alk Phos 694 with normal transaminases. Slightly improved bilateral patchy infiltrates.   6/1: She is anxious to move on but still waiting for insurance approval to [facility].  6/2: Still awaiting bed at [facility]. Remaining afebrile. WBC 8300. Alk Phos 705 but transaminases normal and bilirubine 0.4.  6/4: Awaiting insurance auth before bed can be assigned at [facility]. Remaining afebrile. Extensive folliculitis over back and buttocks.  WBC 8300 on 6/2. Last  on 5/31.  6/5: Her ESR is down to 53 from 102 a good sign hopefully. She continues to work on her strength by walking.  6/6: Speech evaluation to check her swallow as she is stronger and can't stand pureed.  6/7: [facility] was denied by insurance. Could she go to full rehab now? Swallow eval pending.clotrimazole for vaginal drainage and itch.  6/8: Continued to be declined by insurances. No issues clinically. Physical Med declined for rehab. No fevers.  6/9: OT will recommend home PT/OT for her once he finishes her antibiotics.  6/10: Per pt and CM and OT, patient does not need SNF anymore may go straight home.   6/11: Her repeat chest CT shows an 8X6 fluid collection we will see if IR can drain the fluid before she is D/C'd.  6/12: Pulm recommends CT surgery evaluation. This is pending.   6/13: CT surgery recommends IR drain placement. Pending.   6/15: Just back from IR.  CT placed into right chest.  5 cc of thick bloody fluid obtained and sent to lab.   at bedside. States she walked six laps yesterday.  6/16: Chest tube placed. Only minimal output. Possible chest tube removal this afternoon by pulmonology   6/17: CT now removed.  Pending authorization for [facility].  Orders were given to [facility].  CM had no  idea.  6/18: [facility] approved, but awaiting home health approval by insurance. NO other complaints otherwise.     Review of Systems:  Review of Systems   All other systems reviewed and are negative.      Physical Exam:  Physical Exam  Constitutional:       Appearance: Normal appearance. She is obese.   HENT:      Head: Normocephalic.   Cardiovascular:      Rate and Rhythm: Normal rate and regular rhythm.   Pulmonary:      Effort: Pulmonary effort is normal. No respiratory distress.      Comments: Diminished in R base.  Musculoskeletal:      Comments: R PICC intact   Skin:     General: Skin is warm and dry.   Neurological:      Mental Status: She is alert and oriented to person, place, and time.       Labs:  Recent Results (from the past 24 hour(s))   Basic Metabolic Panel    Collection Time: 06/18/21  4:00 AM   Result Value Ref Range    Sodium 133 (L) 135 - 145 mmol/L    Potassium 4.2 3.6 - 5.5 mmol/L    Chloride 95 (L) 96 - 112 mmol/L    Co2 28 20 - 33 mmol/L    Glucose 89 65 - 99 mg/dL    Bun 6 (L) 8 - 22 mg/dL    Creatinine 0.23 (L) 0.50 - 1.40 mg/dL    Calcium 8.9 8.5 - 10.5 mg/dL    Anion Gap 10.0 7.0 - 16.0   CBC WITHOUT DIFFERENTIAL    Collection Time: 06/18/21  4:00 AM   Result Value Ref Range    WBC 4.4 (L) 4.8 - 10.8 K/uL    RBC 4.02 (L) 4.20 - 5.40 M/uL    Hemoglobin 11.4 (L) 12.0 - 16.0 g/dL    Hematocrit 36.0 (L) 37.0 - 47.0 %    MCV 89.6 81.4 - 97.8 fL    MCH 28.4 27.0 - 33.0 pg    MCHC 31.7 (L) 33.6 - 35.0 g/dL    RDW 47.8 35.9 - 50.0 fL    Platelet Count 377 164 - 446 K/uL    MPV 9.5 9.0 - 12.9 fL   ESTIMATED GFR    Collection Time: 06/18/21  4:00 AM   Result Value Ref Range    GFR If African American >60 >60 mL/min/1.73 m 2    GFR If Non African American >60 >60 mL/min/1.73 m 2     Results     Procedure Component Value Units Date/Time    FLUID CULTURE W/GRAM STAIN [[id number]]  (Abnormal)  (Susceptibility) Collected: 06/15/21 1005    Order Status: Completed Specimen: Body Fluid Updated: 06/18/21  0803     Significant Indicator POS     Source BF     Site Pleural Fluid     Culture Result Growth noted after further incubation, see below for  organism identification.       Gram Stain Result Many WBCs.  No organisms seen.       Culture Result Staphylococcus epidermidis  Rare growth      Susceptibility     Staphylococcus epidermidis (1)     Antibiotic Interpretation Microscan Method Status    Azithromycin Resistant >4 mcg/mL AUGUSTINA Final    Clindamycin Resistant >4 mcg/mL AUGUSTINA Final    Cefazolin Resistant >16 mcg/mL AUGUSTINA Final    Cefepime Resistant >16 mcg/mL AUGUSTINA Final    Daptomycin Sensitive <=0.5 mcg/mL AUGUSTINA Final    Erythromycin Resistant >4 mcg/mL AUGUSTINA Final    Ampicillin/sulbactam Resistant 16/8 mcg/mL AUGUSTINA Final    Vancomycin Sensitive 1 mcg/mL AUGUSTINA Final    Oxacillin Resistant >2 mcg/mL AUGUSTINA Final    Trimeth/Sulfa Resistant >2/38 mcg/mL AUGUSTINA Final    Tetracycline Sensitive <=4 mcg/mL AUGUSTINA Final                   Fungal Culture [[id number]] Collected: 06/15/21 1005    Order Status: Completed Specimen: Body Fluid Updated: 21 0803     Significant Indicator NEG     Source BF     Site Pleural Fluid     Culture Result Culture in progress.    GRAM STAIN [[id number]] Collected: 06/15/21 100    Order Status: Completed Specimen: Body Fluid Updated: 06/15/21 1819     Significant Indicator .     Source BF     Site Pleural Fluid     Gram Stain Result Many WBCs.  No organisms seen.      FUNGAL CULTURE [[id number]]     Order Status: No result Specimen: Respirate from Other Body Fluid     FLUID CULTURE W/GRAM STAIN [[id number]]     Order Status: No result Specimen: Body Fluid from Pleural Fluid     FLUID CULTURE W/GRAM STAIN [[id number]]     Order Status: No result Specimen: Body Fluid from Thoracentesis Fluid         Hemodynamics:  Temp (24hrs), Av.2 °C (97.2 °F), Min:36.1 °C (96.9 °F), Max:36.3 °C (97.4 °F)  Temperature: 36.3 °C (97.3 °F)  Pulse  Av.7  Min: 40  Max: 220   Blood Pressure: (!) 96/54 (nurse aware)    PICC  Double Lumen 05/07/21 Lumen 1: Purple Lumen 2: Red Right Brachial (Active)   Site Assessment Clean;Dry;Intact 06/17/21 2039   Lumen 1 Status Scrubbed the hub prior to access;Flushed 06/17/21 2039   Lumen 2 Status Scrubbed the hub prior to access;Infusing 06/17/21 2039   Line Secured at (cm) 1 cm 05/07/21 1118   Extremity Circumference (cm) 30.5 cm 05/07/21 1118   Dressing Type Biopatch;Occlusive;Securing device;Transparent 06/17/21 2039   Dressing Status Clean;Dry;Intact 06/17/21 2039   Dressing Intervention N/A 06/17/21 2039   Dressing Change Due 06/19/21 06/16/21 1200   Date Primary Tubing Changed 06/14/21 06/16/21 1200   Date Secondary Tubing Changed 06/17/21 06/17/21 2039   Date IV Connector(s) Changed 06/14/21 06/16/21 1200   NEXT Primary Tubing Change  06/19/21 06/16/21 1200   NEXT Secondary Tubing Change  06/18/21 06/17/21 2039   NEXT IV Connector(s) Change 06/19/21 06/16/21 1200   Line Necessity Assessed Antibiotic Therapy Greater than 7 Days 06/17/21 2039   $ Double Lumen PICC Charge Single kit used 05/07/21 1118     Wound:  @WOUNDLDA(4)@     Fluids:  Intake/Output                             06/16/21 0700 - 06/17/21 0659 06/17/21 0700 - 06/18/21 0659 06/18/21 0700 - 06/19/21 0659     0700-1859 1900-0659 Total 0700-1859 1900-0659 Total 0700-1859 1900-0659 Total                    Intake    P.O.  760  390 1150  --  150 150  --  -- --    P.O.  -- 150 150 -- -- --    IV Piggyback  100  -- 100  --  -- --  --  -- --    Volume (mL) (ceFAZolin in dextrose (ANCEF) IVPB premix 2 g) 100 -- 100 -- -- -- -- -- --    Total Intake  -- 150 150 -- -- --       Output    Urine  --  -- --  --  -- --  --  -- --    Number of Times Voided -- -- -- -- 1 x 1 x -- -- --    Stool  --  -- --  --  -- --  --  -- --    Number of Times Stooled 1 x -- 1 x -- -- -- -- -- --    Total Output -- -- -- -- -- -- -- -- --       Net I/O      -- 150 150 -- -- --           GI/Nutrition:  Orders Placed This  Encounter   Procedures   • Diet Order Diet: Level 6 - Soft and Bite Sized; Liquid level: Level 2 - Mildly Thick     Standing Status:   Standing     Number of Occurrences:   1     Order Specific Question:   Diet:     Answer:   Level 6 - Soft and Bite Sized [23]     Order Specific Question:   Liquid level     Answer:   Level 2 - Mildly Thick     Medications:  Current Facility-Administered Medications   Medication Last Admin   • diphenhydrAMINE (BENADRYL) tablet/capsule 25 mg     • ceFAZolin in dextrose (ANCEF) IVPB premix 2 g Stopped at 06/18/21 0434   • diphenhydrAMINE (BENADRYL) injection 50 mg 50 mg at 06/18/21 0518   • acetaminophen (TYLENOL) tablet 500 mg 500 mg at 06/18/21 0929   • divalproex (DEPAKOTE SPRINKLE) capsule 500 mg 500 mg at 06/18/21 0407   • DULoxetine (CYMBALTA) capsule 60 mg 60 mg at 06/18/21 0407   • gabapentin (NEURONTIN) capsule 300 mg 300 mg at 06/18/21 0407   • methadone (DOLOPHINE) tablet 30 mg 30 mg at 06/18/21 0407   • PARoxetine (PAXIL) tablet 10 mg 10 mg at 06/18/21 0407   • risperiDONE (RISPERDAL) tablet 2 mg 2 mg at 06/18/21 0407   • senna-docusate (PERICOLACE or SENOKOT S) 8.6-50 MG per tablet 2 tablet 2 tablet at 06/16/21 0411    And   • polyethylene glycol/lytes (MIRALAX) PACKET 1 Packet      And   • magnesium hydroxide (MILK OF MAGNESIA) suspension 30 mL      And   • bisacodyl (DULCOLAX) suppository 10 mg     • acetaminophen (Tylenol) tablet 650 mg 650 mg at 05/30/21 0457   • diazePAM (VALIUM) tablet 5 mg 5 mg at 06/17/21 0007   • oxyCODONE immediate-release (ROXICODONE) tablet 5 mg 5 mg at 06/18/21 0353   • enoxaparin (LOVENOX) inj 40 mg 40 mg at 06/18/21 0407   • Respiratory Therapy Consult     • ondansetron (ZOFRAN) syringe/vial injection 4 mg 4 mg at 06/13/21 1349     Medical Decision Making, by Problem:  Active Hospital Problems    Diagnosis    • *Acute bacterial endocarditis [I33.0]    • Vaginal itching [N89.8]    • Debility [R53.81]    • Elevated alkaline phosphatase level  [R74.8]    • Agitation requiring sedation protocol [R45.1]    • Spinal cord stimulator status [Z96.89]    • Goals of care, counseling/discussion [Z71.89]    • Fever [R50.9]    • Pulmonary abscess (HCC) [J85.2]    • 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0]    • Pseudotumor cerebri [G93.2]    • H/O: CVA (cerebrovascular accident) [Z86.73]    • Chronic pain syndrome [G89.4]    • Port or reservoir infection [T80.212A]    • Thrombocytopenia (HCC) [D69.6]    • Septic shock (HCC) [A41.9, R65.21]    • Anemia [D64.9]    • Sepsis (HCC) [A41.9]    • Hypokalemia [E87.6]    • Hypomagnesemia [E83.42]    • S/P  shunt [Z98.2]    • Anxiety [F41.9]    • Folliculitis [L73.9]    • Acute encephalopathy [G93.40]        Plan:  Plan:  -Severe sepsis  -Catheter related bloodstream infection due to infected port status post removal 4/12-staph aureus  -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   - Acute left empyema s/p chest tube placement 4/23, decortication 5/17, chest tube removal 5/26 - Klebsiella  -Multiple acute septic pulmonary emboli bilaterally  -Acute bilateral pneumonia due to above  --Pulmonary edema  -Pseudotumor cerebri with underlying  shunt: possible arachnoiditis  -Chronic migraine headaches  -History of autoimmune  vasculitis  -Elevated alkaline phosphatase & bilirubin  -Acute encephalopathy due to sepsis  - Anemia due to above  - Acute fever on 5/19 likely from secondary VAP  - Secondary VAP R sided pneumonia  - decubitus ulcer sacral.   - R lung abscess 8x6 cm s/p IR chest tube 6/15 - cultures NGTD     Plan   - New CT placed in the right chest 6/15. Minimal output. Primarily bloody on analysis. WBC 6897, RBC 52657, 100% polys. Cultures NGTD, but has been on abx. Likely the same staph or klebsiella.   - CT was removed.  - new 4 week target date 7/12/21 from chest tube placement  - Will need suppression after therapy for her HW to include spinal stimulator,  shunt. Given MSSA - Keflex appropriate     Dispo: Pt has Medicare/[employer]. Does not have transportation but can make arrangements to followup weekly in [facility]. [facility] home infusion approved, using ertapenem 1gm Q 24 hours.   Orders were given to [facility] by Dr Cox.  CM not aware, but discussed with them on CM round this am. Awaiting home health approval by insurance. Per CM, they are not willing to pay? Unclear if patient even needs home health. Primarily needs antibiotics  LEVI will arrange followup visit next week    Medically stable for discharge. Pending CM arrangements for home health, antibiotics already approved.     Discussed with pt and CM today      30 min spent with 50% face to face contact coordinating care/counseling.

## 2021-06-18 NOTE — THERAPY
Physical Therapy Contact Note    New re-order received; discussed with bedside RN, no new functional changes and is up and ambulatory, waiting to dc today; will dc order as no acute PT needs identified; please reconsult should condition change;     Lisset SANCHEZ, PT,  [phone]

## 2021-06-18 NOTE — CARE PLAN
The patient is Stable - Low risk of patient condition declining or worsening    Shift Goals  Clinical Goals: pt;s pain will be decreased to a tolerable level  Patient Goals: Control pain  Family Goals: NA    Progress made toward(s) clinical / shift goals:  Pt has prn pain medications for pain with good effects.      Problem: Pain Management  Goal: Pain level will decrease to patient's comfort goal  Outcome: Progressing

## 2021-06-18 NOTE — PROGRESS NOTES
Pt alert and oriented x4, on oxygen 1 lpm via NC. Medications administered per MAR. PICC line flushing well. Pain medication given, with goof effects. Fall precautions in place. All needs attended.    Assessment/description of ears? Intact and blanching  Which preventative measures are in place for the ears? Gray foam and cheek pads, reminded to remove glasses when sleeping     Assessment/description of elbows? Intact and blanching  Which preventative measures are in place for the elbows? encouraged to turn frequently     Assessment/description of sacrum? Pink/red rashes  Which preventative measures are in place for the sacrum? Barrier cream, waffle overlay, pillows for support and positioning, ambulates up to bathroom, encouraged to turn frequently     Assessment/description of heels? Intact and blanching  Which preventative measures are in place for the heels? Pillows for offloading, waffle mattress, encouraged to turn frequently     Which devices are in place? PICC, NC  Description of skin under devices: intact and blanching  Which preventative measures are in place under devices? Gray foam pads and cheek pads     Other: R chest tube drain incision site, DTI on R great toe, healed wound on Left middle toe. Dressing on neck

## 2021-06-18 NOTE — DISCHARGE PLANNING
Received Choice form at [phone]  Agency/Facility Name: [facility]  Referral sent per Choice form @ [phone]

## 2021-06-18 NOTE — DISCHARGE PLANNING
Medical Social Work    LSW notified that [facility]  has declined this pt. LSW updated Hali w/ [facility] and LSW updated pt and pt's spouse at bedside. Pt agreeable to HH referrals being sent to [facility] and [facility]. Choice faxed to Harvey SMITH.

## 2021-06-18 NOTE — CARE PLAN
The patient is Stable - Low risk of patient condition declining or worsening    Shift Goals  Clinical Goals: Pt's pain will not get higher than 8/10  Patient Goals: To go home  Family Goals: NA    Progress made toward(s) clinical / shift goals:  pt's pain from 8/10-6/10, plan to DC by the end of the day, pt needs one dose of ertapenem before HH takes over infusions at her home.      Problem: Pain Management  Goal: Pain level will decrease to patient's comfort goal  Outcome: Progressing   See above    Problem: Respiratory  Goal: Patient will achieve/maintain optimum respiratory ventilation and gas exchange  Outcome: Progressing   Pt 95% on RA

## 2021-06-18 NOTE — DISCHARGE PLANNING
Medical Social Work    Anticipated Discharge Disposition: Home w/ [facility] for IV Home Infusion    Action: LSW notified that pt has been accepted by [facility]. LSW notified RN Hali w/ [facility] acceptance.  Hali, states that the pt will need the first dose of ertapenem before discharge and that first does needs to be ordered by ID.  LSW notified bedside RN.    Barriers to Discharge: If not already given, pt's first dose of ertapenem will be needed before pt discharges.      Plan: Pt to dc home w/ [facility] for IV Home Infusion.

## 2021-06-18 NOTE — DISCHARGE PLANNING
Agency/Facility Name: [facility]   Outcome: Sheyla left a vmail stating pt is declined. Sheyla stated that [facility] reviewed referral and concluded pt did not require services from home health    LSW Kasia Hernandez notified

## 2021-06-18 NOTE — DISCHARGE PLANNING
Medical Social Work    Anticipated Discharge Disposition: Home w/ Home Health    Action: LSW contacted [facility] to check on referral. Referral is still pending and LSW spoke w/ Lenore who states that she has to escalate this referral to her Supervisor because it looks like the pt is too high level of care for Home Health. LSW explained to Lenore that the pt has been declined by all SNF's and LTAC.     Home Health needed for: Venous access care, Home IV infusion therapy, Wound Care, Line/Drain/Airway education and care due to encocarditis, empyema in right chest.    Barriers to Discharge: HH acceptance through [facility] w/ Home Infusions through [facility].     Plan: LSW awaiting call back from [facility]  regarding status of referral.       2975 - LSW contacted Hali w/ [facility] and Hali confirmed that [facility] was contacted by Nataly Infectious Disease MD and given order for ertapenem 1gm Q 24 hours.  Hali requesting LSW provide pts' Facesheet, H&P, Picc Line report, and height/weight/allergies. Hali at bedside providing teaching to the pt.  Hali aware that pt's HH referral under review.     4232 - [facility]  still pending.

## 2021-06-18 NOTE — CARE PLAN
The patient is Stable - Low risk of patient condition declining or worsening    Shift Goals  Clinical Goals: Control pain  Patient Goals: Control pain  Family Goals: NA    Progress made toward(s) clinical / shift goals:  Pt. Pain controlled with PRN pain medications.     Patient is not progressing towards the following goals: n/a

## 2021-06-18 NOTE — PROGRESS NOTES
Today's Charles Score: 20    Assessment/description of ears? Pink and blanching  Which preventative measures are in place for the ears? Gray foam over oxygen tubing    Assessment/description of elbows? Pink and blanching  Which preventative measures are in place for the elbows?    Assessment/description of sacrum? Redness, blanching, some old skin breakdown discoloration, but not inflammed  Which preventative measures are in place for the sacrum? Barrier paste     Assessment/description of heels? Pink and blanching  Which preventative measures are in place for the heels?    Which devices are in place? Oxygen tubing  Description of skin under devices: Pink and blanching  Which preventative measures are in place under devices? Gray foam    Other:  Right second toe has old DTI that is RIZWANA  Changed pt's old tracheostomy dressing that had green/yellow discharge. Applied new 2x2 gauze w/ tegaderm.  Changed pt's CT dressing to right mid/lateral breast region, no discharge, still slightly open. Applied new 2x2 dressing and tegaderm.    Extra gauze and tegaderm given to pt's family for re-dressing wound at home as she will be discharging today.

## 2021-06-18 NOTE — DISCHARGE PLANNING
Received Choice form at 4576  Agency[facility]Facility Name: [facility], 2[facility]  Referral sent per Choice form @ 6875

## 2021-06-19 NOTE — DISCHARGE PLANNING
Meds-to-Beds: Discharge prescription orders listed below delivered to patient's bedside. RN Angela notified. Patient counseled.  Patient elected to have co-payment billed to patient account.      Bird Enedeliaalice Ramesh   Home Medication Instructions JARED:[id number]    Printed on:06/18/21 170   Medication Information                      gabapentin (NEURONTIN) 300 MG Cap  Take 1 capsule by mouth every 8 hours.             oxyCODONE immediate-release (ROXICODONE) 5 MG Tab  Take 1 tablet by mouth every 6 hours as needed for Severe Pain for up to 5 days.                 Opal Lugo, PharmD

## 2021-06-19 NOTE — PROGRESS NOTES
Pt finished her Invanz antibiotic infusion around 1840, called [facility] (pt's approved home health) and left a message regarding the completion of her infusion w/o a reaction.    Pt given discharge instructions and handouts on her level 6, nectar thick diet. PICC line remaining for outpt infusions. Both ports working well flushing and drawing blood.     Gauze and tegarderm given to pt's family for dressing change to former tracheostomy that still had drainage.    Gabapentin and oxycodone medications from meds to beds given at discharge, along w/ home medications returned from inpt pharmacy to patient as she was getting wheeled out at 1855.

## 2021-06-19 NOTE — DISCHARGE INSTRUCTIONS
Discharge Instructions    Discharged to home by car with relative. Discharged via wheelchair, hospital escort: Yes.  Special equipment needed: Not Applicable    Be sure to schedule a follow-up appointment with your primary care doctor or any specialists as instructed.     Discharge Plan:   Diet Plan: Discussed  Activity Level: Discussed  Confirmed Follow up Appointment: Appointment Scheduled  Confirmed Symptoms Management: Discussed  Medication Reconciliation Updated: Yes    I understand that a diet low in cholesterol, fat, and sodium is recommended for good health. Unless I have been given specific instructions below for another diet, I accept this instruction as my diet prescription.   Other diet: Regular w/ nectar thick fluids    Special Instructions: None    · Is patient discharged on Warfarin / Coumadin?   No     Depression / Suicide Risk    As you are discharged from this [facility] facility, it is important to learn how to keep safe from harming yourself.    Recognize the warning signs:  · Abrupt changes in personality, positive or negative- including increase in energy   · Giving away possessions  · Change in eating patterns- significant weight changes-  positive or negative  · Change in sleeping patterns- unable to sleep or sleeping all the time   · Unwillingness or inability to communicate  · Depression  · Unusual sadness, discouragement and loneliness  · Talk of wanting to die  · Neglect of personal appearance   · Rebelliousness- reckless behavior  · Withdrawal from people/activities they love  · Confusion- inability to concentrate     If you or a loved one observes any of these behaviors or has concerns about self-harm, here's what you can do:  · Talk about it- your feelings and reasons for harming yourself  · Remove any means that you might use to hurt yourself (examples: pills, rope, extension cords, firearm)  · Get professional help from the community (Mental Health, Substance Abuse, psychological  counseling)  · Do not be alone:Call your Safe Contact- someone whom you trust who will be there for you.  · Call your local CRISIS HOTLINE 979-9717 or 903-515-2285  · Call your local Children's Mobile Crisis Response Team [place] (822) 563-6001 or www.[employer]  · Call the toll free National Suicide Prevention Hotlines   · National Suicide Prevention Lifeline 281-159-HSGD (8177)  · National Hope Line Network 800-SUICIDE (216-4228)

## 2021-06-23 NOTE — DOCUMENTATION QUERY
[facility]                                                                       Query Response Note      PATIENT:               BRIGID DAWSON  ACCT #:                  [id number]  MRN:                     [id number]  :                      1970  ADMIT DATE:       2021 5:21 PM  DISCH DATE:        2021 7:35 PM  RESPONDING  PROVIDER #:        [id number]           QUERY TEXT:    Septic shock is documented as present on admission in the  consult, progress notes and DS but is not reflected on the H&P.  Your help is needed.  Please clarify the POA status.     NOTE:  If an appropriate response is not listed below, please respond with a new note.        The patient's Clinical Indicators include:  Clinical indicators  Empyema  Bacterial endocarditis and septic pulmonary emboli  MSSA bloodstream infection from port  Temp:  [36.3 °C (97.3 °F)] 36.3 °C (97.3 °F)  Pulse:  [60-93] 75  Resp:  [20-26] 20  BP: (104-148)/(56-78) 119/67  WBC 27.1, lactic acid 1.4     Treatment and monitoring  Multiple lung decortications  Chest tubes, tPA  IV antibiotics, pressors  @ 1900    Risk factors  Pseudotumor cerebri, hx CVA, multiple infections and revisions of  shunt, acute respiratory failure    Deandra Del Valle,   [email or web address]  Options provided:   -- Septic shock was present at the time of inpatient admission   -- Septic shock was not present at the time of inpatient admission   -- The provider is unable to clinically determine whether sepsis was present on admission      Query created by: Leonor Del Valle on 2021 2:00 PM    RESPONSE TEXT:    Septic shock was present at the time of inpatient admission          Electronically signed by:  JESSICA STEEL MD 2021 2:08 PM

## 2021-07-07 LAB
FUNGUS SPEC CULT: ABNORMAL
FUNGUS SPEC CULT: ABNORMAL
SIGNIFICANT IND 70042: ABNORMAL
SITE SITE: ABNORMAL
SOURCE SOURCE: ABNORMAL

## 2021-07-14 LAB
FUNGUS SPEC CULT: NORMAL
SIGNIFICANT IND 70042: NORMAL
SITE SITE: NORMAL
SOURCE SOURCE: NORMAL

## 2021-10-05 NOTE — DISCHARGE SUMMARY
10/05/21      Erin Nagel  [address] [place] [address]   1988, MRN [id number]    You and your physician have recently discussed a genetic screening test called Panorama cell free DNA. This is a specialty test that is not performed at [facility]'s lab and will be sent to [facility] to be analyzed. We cannot estimate the level of insurance coverage by your insurance carrier due to the wide variety of insurance plans and the fact that some plans only cover it for very specific conditions.  It is your responsibility verify insurance coverage for this test before having your blood drawn since you will be responsible for any out-of-pocket expense. The blood test will be drawn at [facility].   The sample will be sent to [facility]. You should contact [facility], the lab that processes the Panorama test, for an estimate of cost with your insurance.  You can either call [facility] at [phone] and select option 5 or you can e-mail [facility] at [email or web address]. You should include your name, date of birth, name of test (Panorama), doctor's name, and a copy of the front and back or your insurance card and the state of your insurance.    The CPT codes for this test are as follows  · Panorama lab test:  25234 (in network) or 94461 (out of network)  · Blood draw:  84550  · Handling of specimen: 43533  ICD-10 diagnosis code(s):  Z34.81 Supervision of other normal pregnancy    I understand that by signing this, I am responsible for the out of pocket expenses for the Panorama test.         Patient Signature  Date           Witness Signature  Date      DISCHARGE DIAGNOSES:  1.  Polyarthralgia (inflammatory unspecified).  2.  Complicated migraines.  3.  Chronic pain syndrome, narcotic dependence.  4.  Drug-seeking behavior.  5.  Microcytic anemia.  6.  Iron-deficiency anemia with occult blood positive.    HISTORY AND HOSPITAL COURSE:  Please refer to HPI dictated by Dr. Renetta Jones   on 01/06/2017, for complete details.  In brief, the patient is a 46-year-old   female that presented to the ER with complaints of bilateral hand pain, joint   swelling.  The patient also had been following with ENT for septal perforation   and was thought to be from possible Wegener's.  The patient was referred to   rheumatologist, but she was not able to establish with the rheumatologist   because of secondary insurance issues and her primary care physician was   attempting to work her up for autoimmune problem.  The patient was admitted   for acute polyarthritis and the rheumatologist, Dr. Aimee Andrade was consulted.    Patient was treated with high-dose steroids and also she was treated with pain   medication included oxycodone and Dilaudid IV and later slowly weaned off.    Patient was evaluated for all possible viral causes including Parvovirus, HIV,   hepatitis, which were all negative.  Patient's vitamins including B12,   vitamin D were all checked which was normal.  She was evaluated for other   autoimmune causes including rheumatoid arthritis, rheumatoid factor,   scleroderma, MG, ANCA; all those antibodies were all negative.  The patient   was also receiving Toradol in addition to prednisone.  Patient was now found   to have microcytic anemia.  She was having iron deficiency anemia with occult   blood positive.  She was treated with IV iron followed by oral iron treatment.    Her hemoglobin is stable at 10.9.  Patient followed with GI consultants and   she had a recent colonoscopy few years ago and was normal.  So I have referred   patient to follow with the primary care  physician and with her outpatient   gastroenterologist and I have advised her to avoid any NSAID products and I   have started on omeprazole.  The patient did not have any active bleeding and   her hemoglobin stayed stable at about 10.9 and she has earlier follow up as an   outpatient with [facility].    In regards to her migraine, the patient always takes IM Benadryl and Toradol   at home.  Given her anemia, like mentioned above advised her to not use   Toradol and start follow with outpatient neurologist in situation if her   migraines get worse.  Patient will be following Dr. Aimee Andrade, rheumatologist   in 2 weeks for all the pending studies to be followed.  She is discharged home   on a tapered dose of prednisone per rheumatologist's recommendation.    CONSULTANTS:  Dr. Aimee Andrade rheumatologist.    CONDITION:  Medically stable.    DISPOSITION:  Home.    DIET:  Regular diet.    ACTIVITY:  As tolerated.    DISCHARGE LABS:  WBC 12.5, hemoglobin 10.9, hematocrit 38.4, MCV 75, platelets   692.  Chem panel:  Sodium of 139, potassium 3.6, BUN 16 and creatinine 0.74.    IMAGING:  Left hand, the periarticular osteopenia raising concerning for   inflammatory arthropathy, ____ swelling over the metacarpal, no fracture or   dislocation of left hand.    X-ray of the right hand, mild periarticular osteopenia raises concern for   inflammatory arthropathy.  Right hand otherwise normal.    CT head without contrast, no acute intracranial abnormality.    DISCHARGE MEDICATIONS:  1.  Diamox 500 mg b.i.d.  2.  Calcium carbonate 500 mg b.i.d. with meals.  3.  Vitamin B12 one tablet daily.  4.  Flexeril 10 mg t.i.d. p.r.n. for muscle spasm.  5.  Benadryl 50 mg 1 tablet q. 6 hours p.r.n. for headache.  She takes it with   Toradol.  6.  Benadryl 50 mg IM q. 6 hours p.r.n. for severe acute migraine headache.  7.  Vilazodone 60 mg b.i.d.  8.  Ferrous gluconate 325 mg 1 tablet b.i.d. with meals.  9.  Gabapentin 600 mg b.i.d.  10.   Toradol 60 mg IM or p.r.n. for severe migraine headache.  11.  Mag ox one tablet daily.  12.  Multivitamin 1 tablet daily.  13.  Omeprazole 20 mg daily.  14.  Oxycodone 10 mg q. 4 hours p.r.n. for pain.  15.  Prednisone taper.  16.  Vitamin D3 1000 units daily.    INSTRUCTIONS:  Patient was instructed to return to the emergency room in the   event of any worsening symptoms.  I have counseled the patient importance of   compliance.  She has agreed to proceed all medical recommendations and   followup plan as indicated above.    FOLLOWUP APPOINTMENT DETAILS:  The patient is advised to follow with her   primary care physician, Dr. Elliott Moody in 1 week.  Follow with Dr. Aimee Andrade in 1 week.  Also, patient is advised to follow with GI consultant in 1-2   weeks.    Time spent on discharge day, patient visit, preparing discharge paperwork,   arranging patient followup, 45 minutes.       ____________________________________     MD CHRISTIAN CROUCH / EUGENE    DD:  01/12/2017 21:58:54  DT:  01/12/2017 23:56:42    D#:  [id number]  Job#:  [id number]    cc: ELLIOTT MOODY MD

## 2021-10-07 ENCOUNTER — APPOINTMENT (OUTPATIENT)
Dept: RADIOLOGY | Facility: MEDICAL CENTER | Age: 51
End: 2021-10-07
Attending: EMERGENCY MEDICINE
Payer: MEDICARE

## 2021-10-07 ENCOUNTER — HOSPITAL ENCOUNTER (EMERGENCY)
Facility: MEDICAL CENTER | Age: 51
End: 2021-10-07
Attending: EMERGENCY MEDICINE
Payer: MEDICARE

## 2021-10-07 VITALS
OXYGEN SATURATION: 92 % | HEART RATE: 98 BPM | TEMPERATURE: 98 F | SYSTOLIC BLOOD PRESSURE: 114 MMHG | DIASTOLIC BLOOD PRESSURE: 93 MMHG | RESPIRATION RATE: 20 BRPM | WEIGHT: 117 LBS | HEIGHT: 63 IN | BODY MASS INDEX: 20.73 KG/M2

## 2021-10-07 DIAGNOSIS — S09.90XA CLOSED HEAD INJURY, INITIAL ENCOUNTER: ICD-10-CM

## 2021-10-07 DIAGNOSIS — S20.211A CONTUSION OF RIGHT CHEST WALL, INITIAL ENCOUNTER: ICD-10-CM

## 2021-10-07 LAB
ANION GAP SERPL CALC-SCNC: 18 MMOL/L (ref 7–16)
APTT PPP: 29.7 SEC (ref 24.7–36)
BASOPHILS # BLD AUTO: 0.3 % (ref 0–1.8)
BASOPHILS # BLD: 0.02 K/UL (ref 0–0.12)
BUN SERPL-MCNC: 4 MG/DL (ref 8–22)
CALCIUM SERPL-MCNC: 9.8 MG/DL (ref 8.5–10.5)
CHLORIDE SERPL-SCNC: 100 MMOL/L (ref 96–112)
CO2 SERPL-SCNC: 22 MMOL/L (ref 20–33)
CREAT SERPL-MCNC: 0.43 MG/DL (ref 0.5–1.4)
EOSINOPHIL # BLD AUTO: 0 K/UL (ref 0–0.51)
EOSINOPHIL NFR BLD: 0 % (ref 0–6.9)
ERYTHROCYTE [DISTWIDTH] IN BLOOD BY AUTOMATED COUNT: 47.6 FL (ref 35.9–50)
GLUCOSE SERPL-MCNC: 117 MG/DL (ref 65–99)
HCT VFR BLD AUTO: 48.2 % (ref 37–47)
HGB BLD-MCNC: 16.1 G/DL (ref 12–16)
IMM GRANULOCYTES # BLD AUTO: 0.03 K/UL (ref 0–0.11)
IMM GRANULOCYTES NFR BLD AUTO: 0.4 % (ref 0–0.9)
INR PPP: 0.99 (ref 0.87–1.13)
LYMPHOCYTES # BLD AUTO: 1.2 K/UL (ref 1–4.8)
LYMPHOCYTES NFR BLD: 15.8 % (ref 22–41)
MCH RBC QN AUTO: 31.5 PG (ref 27–33)
MCHC RBC AUTO-ENTMCNC: 33.4 G/DL (ref 33.6–35)
MCV RBC AUTO: 94.3 FL (ref 81.4–97.8)
MONOCYTES # BLD AUTO: 0.57 K/UL (ref 0–0.85)
MONOCYTES NFR BLD AUTO: 7.5 % (ref 0–13.4)
NEUTROPHILS # BLD AUTO: 5.78 K/UL (ref 2–7.15)
NEUTROPHILS NFR BLD: 76 % (ref 44–72)
NRBC # BLD AUTO: 0 K/UL
NRBC BLD-RTO: 0 /100 WBC
PLATELET # BLD AUTO: 424 K/UL (ref 164–446)
PMV BLD AUTO: 9.6 FL (ref 9–12.9)
POTASSIUM SERPL-SCNC: 3.9 MMOL/L (ref 3.6–5.5)
PROTHROMBIN TIME: 12.8 SEC (ref 12–14.6)
RBC # BLD AUTO: 5.11 M/UL (ref 4.2–5.4)
SODIUM SERPL-SCNC: 140 MMOL/L (ref 135–145)
WBC # BLD AUTO: 7.6 K/UL (ref 4.8–10.8)

## 2021-10-07 PROCEDURE — 80048 BASIC METABOLIC PNL TOTAL CA: CPT

## 2021-10-07 PROCEDURE — 700111 HCHG RX REV CODE 636 W/ 250 OVERRIDE (IP): Performed by: EMERGENCY MEDICINE

## 2021-10-07 PROCEDURE — 96374 THER/PROPH/DIAG INJ IV PUSH: CPT

## 2021-10-07 PROCEDURE — 700111 HCHG RX REV CODE 636 W/ 250 OVERRIDE (IP)

## 2021-10-07 PROCEDURE — 36415 COLL VENOUS BLD VENIPUNCTURE: CPT

## 2021-10-07 PROCEDURE — 85025 COMPLETE CBC W/AUTO DIFF WBC: CPT

## 2021-10-07 PROCEDURE — 99284 EMERGENCY DEPT VISIT MOD MDM: CPT

## 2021-10-07 PROCEDURE — 85730 THROMBOPLASTIN TIME PARTIAL: CPT

## 2021-10-07 PROCEDURE — 85610 PROTHROMBIN TIME: CPT

## 2021-10-07 PROCEDURE — 70486 CT MAXILLOFACIAL W/O DYE: CPT | Mod: ME

## 2021-10-07 PROCEDURE — 70450 CT HEAD/BRAIN W/O DYE: CPT | Mod: ME

## 2021-10-07 PROCEDURE — 71046 X-RAY EXAM CHEST 2 VIEWS: CPT

## 2021-10-07 RX ORDER — ONDANSETRON 2 MG/ML
INJECTION INTRAMUSCULAR; INTRAVENOUS
Status: COMPLETED
Start: 2021-10-07 | End: 2021-10-07

## 2021-10-07 RX ORDER — HYDROMORPHONE HYDROCHLORIDE 1 MG/ML
1 INJECTION, SOLUTION INTRAMUSCULAR; INTRAVENOUS; SUBCUTANEOUS ONCE
Status: COMPLETED | OUTPATIENT
Start: 2021-10-07 | End: 2021-10-07

## 2021-10-07 RX ADMIN — ONDANSETRON 4 MG: 2 INJECTION INTRAMUSCULAR; INTRAVENOUS at 01:52

## 2021-10-07 RX ADMIN — FENTANYL CITRATE 100 MCG: 50 INJECTION INTRAMUSCULAR; INTRAVENOUS at 01:52

## 2021-10-07 RX ADMIN — HYDROMORPHONE HYDROCHLORIDE 1 MG: 1 INJECTION, SOLUTION INTRAMUSCULAR; INTRAVENOUS; SUBCUTANEOUS at 02:51

## 2021-10-07 ASSESSMENT — FIBROSIS 4 INDEX: FIB4 SCORE: 0.8

## 2021-10-07 NOTE — ED NOTES
Patient educated on discharge instructions and home care. Patient verbalized understanding. Patient ambulated to [facility].

## 2021-10-07 NOTE — ED NOTES
Patient changed into gown and placed on monitor. IV started and labs drawn. Patient medicated for pain and nausea. Updated patient on plan of care, verbalized understanding. Patient wearing mask on arrival, family at bedside

## 2021-10-07 NOTE — ED PROVIDER NOTES
ED Provider Note        Primary care provider: Elliott Power M.D.  t all times during my encounter except for a brief view of the oropharynx.      CHIEF COMPLAINT  Chief Complaint   Patient presents with   • T-5000 GLF   • Head Pain   • N/V       HPI  Enedeliaalice Pang is a 51 y.o. female who presents to the Emergency Department with chief complaint of headache.  Patient reports that she was walking her dog yesterday tripped and fell and hit the right side of her face.  Negative loss of consciousness since that pain time she has been having increasing pain and some blurry vision in her right eye.  She reports minor nausea one episode of emesis no blood in emesis she has had no abdominal pain no constipation no diarrhea no urinary issues no vaginal bleeding or discharge denies chance of pregnancy.  She secondarily complains of when she hit her head she also hit the right side of her chest and is having some pain over the right chest wall.  Slightly worse with ambulation and with deep inspiration no other modifying factors    REVIEW OF SYSTEMS  10 systems reviewed and otherwise negative, pertinent positives and negatives listed in the history of present illness.    PAST MEDICAL HISTORY   has a past medical history of Allergy, unspecified not elsewhere classified, Anemia (10/05/2017), Anesthesia, Anxiety, Anxiety, generalized, Arthritis, autoimmune, Autoimmune disorder (HCC), Back pain, Clostridium difficile diarrhea (2014), Depression, Elevated liver enzymes (2017), Fall, H/O Clostridium difficile infection (2014), MRSA infection (2003), Hydrocephalus ([facility]) (2015), Hyponatremia (9/28/2019), Joint pain (09/10/2019), Migraine with aura, with intractable migraine, so stated, without mention of status migrainosus, MRSA (methicillin resistant Staphylococcus aureus) (08/2015), MRSA (methicillin resistant Staphylococcus aureus) (12/2017), MRSA (methicillin resistant Staphylococcus aureus) (2018), Pituitary abnormality  (HCC) (2002), Port or reservoir infection (10/3/2015), Requires supplemental oxygen, Seizure (HCC) (started 2017), Sepsis (HCC) (8/30/2015), Staph infection, and Stroke ([facility]) (2007).    SURGICAL HISTORY   has a past surgical history that includes tonsillectomy (1985); other neurological surg (4310-2068); irrigation & debridement neuro (N/A, 6/28/2015); lumbar exploration (N/A, 8/31/2015); spinal cord stimulator (12/19/2016);  shunt insertion (5/31/2017); liver biopsy (07/24/2017); cholecystectomy (2001); appendectomy (1982); spinal cord stimulator (05/24/2018); spinal cord stimulator (2003); knee arthroscopy (Right, 1990); full thickness skin graft (Left, 04/2018); other surgical procedure (11/10/2017); other neurological surg (08/2015); endometrial ablation (2001); tubal coagulation laparoscopic bilateral (Bilateral, 2001); implant neurostim/ (9/13/2019); other (Left, 01/23/2020); thoracoscopy,dx no bx (Right, 4/28/2021); decortication (Right, 4/28/2021); thoracotomy (Right, 5/17/2021); and decortication (5/17/2021).    SOCIAL HISTORY  Social History     Tobacco Use   • Smoking status: Never Smoker   • Smokeless tobacco: Never Used   Vaping Use   • Vaping Use: Never used   Substance Use Topics   • Alcohol use: Not Currently   • Drug use: Never      Social History     Substance and Sexual Activity   Drug Use Never       FAMILY HISTORY  Non-Contributory    CURRENT MEDICATIONS  Home Medications     Reviewed by Lory Lorenz R.N. (Registered Nurse) on 10/07/21 at 0126  Med List Status: Partial   Medication Last Dose Status   AIMOVIG 70 MG/ML Solution Auto-injector  Active   aspirin 81 MG EC tablet  Active   diphenhydrAMINE (BENADRYL) 50 MG/ML Solution  Active   divalproex ER (DEPAKOTE ER) 250 MG TABLET SR 24 HR  Active   DULoxetine (CYMBALTA) 60 MG Cap DR Particles delayed-release capsule  Active   gabapentin (NEURONTIN) 300 MG Cap  Active   Multiple Vitamins-Minerals (WOMENS MULTIVITAMIN) Tab  Active  "  PARoxetine (PAXIL) 10 MG Tab  Active   risperiDONE (RISPERDAL) 2 MG Tab  Active   Ubrogepant (UBRELVY) 100 MG Tab  Active                ALLERGIES  Allergies   Allergen Reactions   • Sumatriptan Anaphylaxis     Historical=\"Heart stops.\" Reaction  between 1995 to 1997   • Prochlorperazine Rash and Swelling     Tongue swelling. Reaction as a teen. (compazine)  Tolerated Phenergan on 02/24/15   • Vancomycin Shortness of Breath and Rash     Reaction in 2005.  D/W patient 8/31/15 - tolerated loading dose of vancomycin administered on 8/30/15 with some itching to chest with decreased infusion rate. Red Man Syndrome.  2018-tolerated slow drip with benadryl pre-med-had no problems.       PHYSICAL EXAM  VITAL SIGNS: /112   Pulse 89   Temp 36.7 °C (98 °F) (Temporal)   Resp 20   Ht 1.6 m (5' 3\")   Wt 53.1 kg (117 lb)   SpO2 99% Comment: RA  BMI 20.73 kg/m²   Pulse ox interpretation: I interpret this pulse ox as normal.  Constitutional: Alert and oriented x 3, minimal distress  HEENT: Patient has moderate amount of ecchymosis abrasion over the right forehead right lateral orbit and right maxillary process.  E pupils are equal round reactive to light extraocular movements are intact including upper gaze without pain there is no evidence of hyphema or subconjunctival hemorrhage, pupils are equal round, extraocular movements are intact. The nares is clear, external ears are normal, mouth shows moist mucous membranes  Neck: no obvious JVD or tracheal deviation  Cardiovascular: Regular rate and rhythm no murmur rub or gallop   Thorax & Lungs: No respiratory distress, no wheezes rales or rhonchi, No chest tenderness.   GI: Soft nontender nondistended positive bowel sounds, no peritoneal signs  Skin: Warm dry no obvious acute rash or lesion  Musculoskeletal: Moving all extremities with normal range strength, no acute  deformity  Neurologic: Cranial nerves III through XII are grossly intact, no sensory deficit, no " cerebellar dysfunction   Psychiatric: Appropriate affect for situation at this time      DIAGNOSTIC STUDIES / PROCEDURES  LABS    Results for orders placed or performed during the hospital encounter of 10/07/21   CBC WITH DIFFERENTIAL   Result Value Ref Range    WBC 7.6 4.8 - 10.8 K/uL    RBC 5.11 4.20 - 5.40 M/uL    Hemoglobin 16.1 (H) 12.0 - 16.0 g/dL    Hematocrit 48.2 (H) 37.0 - 47.0 %    MCV 94.3 81.4 - 97.8 fL    MCH 31.5 27.0 - 33.0 pg    MCHC 33.4 (L) 33.6 - 35.0 g/dL    RDW 47.6 35.9 - 50.0 fL    Platelet Count 424 164 - 446 K/uL    MPV 9.6 9.0 - 12.9 fL    Neutrophils-Polys 76.00 (H) 44.00 - 72.00 %    Lymphocytes 15.80 (L) 22.00 - 41.00 %    Monocytes 7.50 0.00 - 13.40 %    Eosinophils 0.00 0.00 - 6.90 %    Basophils 0.30 0.00 - 1.80 %    Immature Granulocytes 0.40 0.00 - 0.90 %    Nucleated RBC 0.00 /100 WBC    Neutrophils (Absolute) 5.78 2.00 - 7.15 K/uL    Lymphs (Absolute) 1.20 1.00 - 4.80 K/uL    Monos (Absolute) 0.57 0.00 - 0.85 K/uL    Eos (Absolute) 0.00 0.00 - 0.51 K/uL    Baso (Absolute) 0.02 0.00 - 0.12 K/uL    Immature Granulocytes (abs) 0.03 0.00 - 0.11 K/uL    NRBC (Absolute) 0.00 K/uL   BASIC METABOLIC PANEL   Result Value Ref Range    Sodium 140 135 - 145 mmol/L    Potassium 3.9 3.6 - 5.5 mmol/L    Chloride 100 96 - 112 mmol/L    Co2 22 20 - 33 mmol/L    Glucose 117 (H) 65 - 99 mg/dL    Bun 4 (L) 8 - 22 mg/dL    Creatinine 0.43 (L) 0.50 - 1.40 mg/dL    Calcium 9.8 8.5 - 10.5 mg/dL    Anion Gap 18.0 (H) 7.0 - 16.0   PROTHROMBIN TIME   Result Value Ref Range    PT 12.8 12.0 - 14.6 sec    INR 0.99 0.87 - 1.13   APTT   Result Value Ref Range    APTT 29.7 24.7 - 36.0 sec   ESTIMATED GFR   Result Value Ref Range    GFR If African American >60 >60 mL/min/1.73 m 2    GFR If Non African American >60 >60 mL/min/1.73 m 2       All labs reviewed by me.      RADIOLOGY  No orders to display         COURSE & MEDICAL DECISION MAKING  Pertinent Labs & Imaging studies reviewed. (See chart for  "details)    1:40 AM - Patient seen and examined at bedside.         Patient noted to have slightly elevated blood pressure likely circumstantial secondary to presenting complaint. Referred to primary care physician for further evaluation.        Medical Decision Making: Patient was having large amount of pain possible component of anxiety at arrival labs are reassuring slight anion gap acidosis otherwise unremarkable head CT facial CT demonstrated no acute abnormalities are some degenerative changes and some anterior listhesis 3 and visualized C-spine however patient is having no neck pain at this time this likely chronic finding.  Patient is given instructions on symptomatic management follow-up with primary care in a week return for worsening pain nausea vomiting headache altered mental status any other acute symptoms or concerns otherwise discharged stable and improved condition.    /112   Pulse 89   Temp 36.7 °C (98 °F) (Temporal)   Resp 20   Ht 1.6 m (5' 3\")   Wt 53.1 kg (117 lb)   SpO2 99% Comment: RA  BMI 20.73 kg/m²     Elliott Power M.D.  [address] [facility] [address]  [phone]    In 1 week  For repeat head injury exam    [facility], Emergency Dept  [address] [facility] [id number] [phone]    in 12-24 hours if symptoms persist, immediately If symptoms worsen, or if you develop any other symptoms or concerns      Discharge Medication List as of 10/7/2021  4:10 AM          FINAL IMPRESSION  1. Closed head injury, initial encounter Active   2. Contusion of right chest wall, initial encounter Active         This dictation has been created using voice recognition software and/or scribes. The accuracy of the dictation is limited by the abilities of the software and the expertise of the scribes. I expect there may be some errors of grammar and possibly content. I made every attempt to manually correct the errors within my dictation. However, errors related " to voice recognition software and/or scribes may still exist and should be interpreted within the appropriate context.

## 2021-10-07 NOTE — ED TRIAGE NOTES
Chief Complaint   Patient presents with   • T-5000 GLF   • Head Pain   • N/V     Patient to triage via hospital wheelchair. States that she had a GLF yesterday while walking her dogs. Unknown loc, taking baby aspirin. Report pain and n/v that has been increasing since yesterday. Patient states crying uncontrollably in triage with full body shaking. Says that her head pain is 10/10.

## 2021-11-06 ENCOUNTER — APPOINTMENT (OUTPATIENT)
Dept: RADIOLOGY | Facility: MEDICAL CENTER | Age: 51
End: 2021-11-06
Attending: STUDENT IN AN ORGANIZED HEALTH CARE EDUCATION/TRAINING PROGRAM
Payer: MEDICARE

## 2021-11-06 ENCOUNTER — HOSPITAL ENCOUNTER (EMERGENCY)
Facility: MEDICAL CENTER | Age: 51
End: 2021-11-07
Attending: STUDENT IN AN ORGANIZED HEALTH CARE EDUCATION/TRAINING PROGRAM
Payer: MEDICARE

## 2021-11-06 ENCOUNTER — APPOINTMENT (OUTPATIENT)
Dept: RADIOLOGY | Facility: MEDICAL CENTER | Age: 51
End: 2021-11-06
Attending: EMERGENCY MEDICINE
Payer: MEDICARE

## 2021-11-06 DIAGNOSIS — G43.809 OTHER MIGRAINE WITHOUT STATUS MIGRAINOSUS, NOT INTRACTABLE: ICD-10-CM

## 2021-11-06 LAB
ANION GAP SERPL CALC-SCNC: 14 MMOL/L (ref 7–16)
BASOPHILS # BLD AUTO: 0.3 % (ref 0–1.8)
BASOPHILS # BLD: 0.02 K/UL (ref 0–0.12)
BUN SERPL-MCNC: 4 MG/DL (ref 8–22)
CALCIUM SERPL-MCNC: 8.8 MG/DL (ref 8.5–10.5)
CHLORIDE SERPL-SCNC: 97 MMOL/L (ref 96–112)
CO2 SERPL-SCNC: 23 MMOL/L (ref 20–33)
CREAT SERPL-MCNC: 0.46 MG/DL (ref 0.5–1.4)
EOSINOPHIL # BLD AUTO: 0 K/UL (ref 0–0.51)
EOSINOPHIL NFR BLD: 0 % (ref 0–6.9)
ERYTHROCYTE [DISTWIDTH] IN BLOOD BY AUTOMATED COUNT: 42.5 FL (ref 35.9–50)
GLUCOSE SERPL-MCNC: 80 MG/DL (ref 65–99)
HCT VFR BLD AUTO: 36.9 % (ref 37–47)
HGB BLD-MCNC: 12.8 G/DL (ref 12–16)
IMM GRANULOCYTES # BLD AUTO: 0.03 K/UL (ref 0–0.11)
IMM GRANULOCYTES NFR BLD AUTO: 0.4 % (ref 0–0.9)
LYMPHOCYTES # BLD AUTO: 1.93 K/UL (ref 1–4.8)
LYMPHOCYTES NFR BLD: 24.6 % (ref 22–41)
MCH RBC QN AUTO: 32 PG (ref 27–33)
MCHC RBC AUTO-ENTMCNC: 34.7 G/DL (ref 33.6–35)
MCV RBC AUTO: 92.3 FL (ref 81.4–97.8)
MONOCYTES # BLD AUTO: 0.57 K/UL (ref 0–0.85)
MONOCYTES NFR BLD AUTO: 7.3 % (ref 0–13.4)
NEUTROPHILS # BLD AUTO: 5.31 K/UL (ref 2–7.15)
NEUTROPHILS NFR BLD: 67.4 % (ref 44–72)
NRBC # BLD AUTO: 0 K/UL
NRBC BLD-RTO: 0 /100 WBC
PLATELET # BLD AUTO: 316 K/UL (ref 164–446)
PMV BLD AUTO: 9.8 FL (ref 9–12.9)
POTASSIUM SERPL-SCNC: 3.5 MMOL/L (ref 3.6–5.5)
RBC # BLD AUTO: 4 M/UL (ref 4.2–5.4)
SODIUM SERPL-SCNC: 134 MMOL/L (ref 135–145)
WBC # BLD AUTO: 7.9 K/UL (ref 4.8–10.8)

## 2021-11-06 PROCEDURE — 80048 BASIC METABOLIC PNL TOTAL CA: CPT

## 2021-11-06 PROCEDURE — 96374 THER/PROPH/DIAG INJ IV PUSH: CPT

## 2021-11-06 PROCEDURE — 99284 EMERGENCY DEPT VISIT MOD MDM: CPT

## 2021-11-06 PROCEDURE — 74018 RADEX ABDOMEN 1 VIEW: CPT

## 2021-11-06 PROCEDURE — 700105 HCHG RX REV CODE 258: Performed by: STUDENT IN AN ORGANIZED HEALTH CARE EDUCATION/TRAINING PROGRAM

## 2021-11-06 PROCEDURE — 70450 CT HEAD/BRAIN W/O DYE: CPT

## 2021-11-06 PROCEDURE — 700101 HCHG RX REV CODE 250: Performed by: STUDENT IN AN ORGANIZED HEALTH CARE EDUCATION/TRAINING PROGRAM

## 2021-11-06 PROCEDURE — 72020 X-RAY EXAM OF SPINE 1 VIEW: CPT

## 2021-11-06 PROCEDURE — 71046 X-RAY EXAM CHEST 2 VIEWS: CPT

## 2021-11-06 PROCEDURE — 85025 COMPLETE CBC W/AUTO DIFF WBC: CPT

## 2021-11-06 PROCEDURE — 700111 HCHG RX REV CODE 636 W/ 250 OVERRIDE (IP)

## 2021-11-06 PROCEDURE — 70250 X-RAY EXAM OF SKULL: CPT

## 2021-11-06 RX ORDER — ONDANSETRON 4 MG/1
4 TABLET, ORALLY DISINTEGRATING ORAL ONCE
Status: COMPLETED | OUTPATIENT
Start: 2021-11-06 | End: 2021-11-06

## 2021-11-06 RX ORDER — SODIUM CHLORIDE, SODIUM LACTATE, POTASSIUM CHLORIDE, CALCIUM CHLORIDE 600; 310; 30; 20 MG/100ML; MG/100ML; MG/100ML; MG/100ML
1000 INJECTION, SOLUTION INTRAVENOUS ONCE
Status: COMPLETED | OUTPATIENT
Start: 2021-11-06 | End: 2021-11-06

## 2021-11-06 RX ADMIN — ONDANSETRON 4 MG: 4 TABLET, ORALLY DISINTEGRATING ORAL at 21:07

## 2021-11-06 RX ADMIN — SODIUM CHLORIDE, POTASSIUM CHLORIDE, SODIUM LACTATE AND CALCIUM CHLORIDE 1000 ML: 600; 310; 30; 20 INJECTION, SOLUTION INTRAVENOUS at 22:53

## 2021-11-06 RX ADMIN — KETAMINE HYDROCHLORIDE 25 MG: 10 INJECTION, SOLUTION INTRAMUSCULAR; INTRAVENOUS at 23:40

## 2021-11-06 ASSESSMENT — LIFESTYLE VARIABLES: DO YOU DRINK ALCOHOL: NO

## 2021-11-06 ASSESSMENT — FIBROSIS 4 INDEX: FIB4 SCORE: 0.71

## 2021-11-07 VITALS
WEIGHT: 121.69 LBS | DIASTOLIC BLOOD PRESSURE: 58 MMHG | HEART RATE: 94 BPM | OXYGEN SATURATION: 95 % | BODY MASS INDEX: 21.56 KG/M2 | RESPIRATION RATE: 15 BRPM | HEIGHT: 63 IN | TEMPERATURE: 98.1 F | SYSTOLIC BLOOD PRESSURE: 111 MMHG

## 2021-11-07 NOTE — ED NOTES
Pt ambulatory with steady gait to YL60, pt in gown and on gurney, call light in reach, chart up for ERP.

## 2021-11-07 NOTE — ED NOTES
"Pt discharged home via ambulatory to lobby with steady gait, AOx4, family accompanying. IV discontinued and gauze placed, pt in possession of belongings. Pt provided discharge education and information pertaining to medications and follow up appointments. Pt received copy of discharge instructions and verbalized understanding.     /58   Pulse 94   Temp 36.7 °C (98.1 °F) (Temporal)   Resp 15   Ht 1.6 m (5' 3\")   Wt 55.2 kg (121 lb 11.1 oz)   SpO2 95%   BMI 21.56 kg/m²   "

## 2021-11-07 NOTE — ED PROVIDER NOTES
"ED Provider Note    CHIEF COMPLAINT  Chief Complaint   Patient presents with   • Migraine     head ache pain started to get worse around noon, pain 8/10, pt took benadryl and toradol 5 hours ago but it did not help, pt states she has a shunt in her brain and headache feel pressure related       HPI  Enedelia Pang is a 51 y.o. female with history of chronic migraines, pseudotumor cerebri status post  shunt, autoimmune disorder who presents with left-sided headache that started earlier today.  Patient states it initially felt like her usual migraines and then slowly increased in size and now feels \"pressure related\" such as \"either the weather or my shunt\".  Location is the same as her usual migraines.  Reports bilateral blurred vision that is consistent with her usual migraines.  Also reports vomiting at home that is also consistent with her usual migraines.  She does feel the headache is worse than her usual migraines.  Denies any focal numbness, weakness with this headache although reports she has had this in the past.  States she took Toradol at home with initial mild relief however after that the headache worsened.   shunt inserted in 2017, patient and  deny any infections or any revisions.  She has not had any fevers.  States the headache is improved as she sits upright, worse if she lays flat.  Denies any cough, congestion, abdominal pain, shortness of breath. patient states ketamine infusions usually help her migraines.    REVIEW OF SYSTEMS  See HPI for further details. All other systems are negative.     PAST MEDICAL HISTORY   has a past medical history of Allergy, unspecified not elsewhere classified, Anemia (10/05/2017), Anesthesia, Anxiety, Anxiety, generalized, Arthritis, autoimmune, Autoimmune disorder (HCC), Back pain, Clostridium difficile diarrhea (2014), Depression, Elevated liver enzymes (2017), Fall, H/O Clostridium difficile infection (2014), MRSA infection (2003), Hydrocephalus " ([facility]) (2015), Hyponatremia (9/28/2019), Joint pain (09/10/2019), Migraine with aura, with intractable migraine, so stated, without mention of status migrainosus, MRSA (methicillin resistant Staphylococcus aureus) (08/2015), MRSA (methicillin resistant Staphylococcus aureus) (12/2017), MRSA (methicillin resistant Staphylococcus aureus) (2018), Pituitary abnormality ([facility]) (2002), Port or reservoir infection (10/3/2015), Requires supplemental oxygen, Seizure ([facility]) (started 2017), Sepsis ([facility]) (8/30/2015), Staph infection, and Stroke ([facility]) (2007).    SOCIAL HISTORY  Social History     Tobacco Use   • Smoking status: Never Smoker   • Smokeless tobacco: Never Used   Vaping Use   • Vaping Use: Never used   Substance and Sexual Activity   • Alcohol use: Not Currently   • Drug use: Never   • Sexual activity: Not on file       SURGICAL HISTORY   has a past surgical history that includes tonsillectomy (1985); other neurological surg (4499-0582); irrigation & debridement neuro (N/A, 6/28/2015); lumbar exploration (N/A, 8/31/2015); spinal cord stimulator (12/19/2016);  shunt insertion (5/31/2017); liver biopsy (07/24/2017); cholecystectomy (2001); appendectomy (1982); spinal cord stimulator (05/24/2018); spinal cord stimulator (2003); knee arthroscopy (Right, 1990); full thickness skin graft (Left, 04/2018); other surgical procedure (11/10/2017); other neurological surg (08/2015); endometrial ablation (2001); tubal coagulation laparoscopic bilateral (Bilateral, 2001); implant neurostim/ (9/13/2019); other (Left, 01/23/2020); thoracoscopy,dx no bx (Right, 4/28/2021); decortication (Right, 4/28/2021); thoracotomy (Right, 5/17/2021); and decortication (5/17/2021).    CURRENT MEDICATIONS  Home Medications     Reviewed by Lalo Fierro R.N. (Registered Nurse) on 11/06/21 at 2103  Med List Status: Not Addressed   Medication Last Dose Status   AIMOVIG 70 MG/ML Solution Auto-injector  Active   aspirin 81 MG EC tablet   "Active   diphenhydrAMINE (BENADRYL) 50 MG/ML Solution  Active   divalproex ER (DEPAKOTE ER) 250 MG TABLET SR 24 HR  Active   DULoxetine (CYMBALTA) 60 MG Cap DR Particles delayed-release capsule  Active   gabapentin (NEURONTIN) 300 MG Cap  Active   Multiple Vitamins-Minerals (WOMENS MULTIVITAMIN) Tab  Active   PARoxetine (PAXIL) 10 MG Tab  Active   risperiDONE (RISPERDAL) 2 MG Tab  Active   Ubrogepant (UBRELVY) 100 MG Tab  Active                ALLERGIES  Allergies   Allergen Reactions   • Sumatriptan Anaphylaxis     Historical=\"Heart stops.\" Reaction  between 1995 to 1997   • Prochlorperazine Rash and Swelling     Tongue swelling. Reaction as a teen. (compazine)  Tolerated Phenergan on 02/24/15   • Vancomycin Shortness of Breath and Rash     Reaction in 2005.  D/W patient 8/31/15 - tolerated loading dose of vancomycin administered on 8/30/15 with some itching to chest with decreased infusion rate. Red Man Syndrome.  2018-tolerated slow drip with benadryl pre-med-had no problems.       PHYSICAL EXAM  VITAL SIGNS: /58   Pulse 94   Temp 36.7 °C (98.1 °F) (Temporal)   Resp 15   Ht 1.6 m (5' 3\")   Wt 55.2 kg (121 lb 11.1 oz)   SpO2 95%   BMI 21.56 kg/m²     Pulse ox interpretation: I interpret this pulse ox as normal.  Constitutional: Alert in no apparent distress.  HENT: No signs of trauma, Bilateral external ears normal, Nose normal.  Shunt present left skull, no overlying tenderness or erythema, balloon compressible  Eyes: Pupils are equal and reactive 3mm claritza, Conjunctiva normal, Non-icteric.   Neck: Normal range of motion, No tenderness, Supple, No stridor.   Cardiovascular: Regular rate and rhythm, no murmurs.   Thorax & Lungs: Normal breath sounds, No respiratory distress, No wheezing, No chest tenderness.   Abdomen:  Soft, No tenderness, No masses, No pulsatile masses. No peritoneal signs.  Skin: Warm, Dry, No erythema, No rash.   Back: No bony tenderness, No CVA tenderness.   Extremities: Intact " distal pulses, No edema, No tenderness, No cyanosis.  Musculoskeletal: Good range of motion in all major joints. No major deformities noted.   Neurologic: 5/5 bilateral upper and lower extremity strength, Grossly intact sensation symmetrically, CN II-XII intact, Normal, fluent speech, GCS 15  Psychiatric: Affect normal, Judgment normal, Mood normal.       DIAGNOSTIC STUDIES / PROCEDURES      LABS  Results for orders placed or performed during the hospital encounter of 11/06/21   CBC WITH DIFFERENTIAL   Result Value Ref Range    WBC 7.9 4.8 - 10.8 K/uL    RBC 4.00 (L) 4.20 - 5.40 M/uL    Hemoglobin 12.8 12.0 - 16.0 g/dL    Hematocrit 36.9 (L) 37.0 - 47.0 %    MCV 92.3 81.4 - 97.8 fL    MCH 32.0 27.0 - 33.0 pg    MCHC 34.7 33.6 - 35.0 g/dL    RDW 42.5 35.9 - 50.0 fL    Platelet Count 316 164 - 446 K/uL    MPV 9.8 9.0 - 12.9 fL    Neutrophils-Polys 67.40 44.00 - 72.00 %    Lymphocytes 24.60 22.00 - 41.00 %    Monocytes 7.30 0.00 - 13.40 %    Eosinophils 0.00 0.00 - 6.90 %    Basophils 0.30 0.00 - 1.80 %    Immature Granulocytes 0.40 0.00 - 0.90 %    Nucleated RBC 0.00 /100 WBC    Neutrophils (Absolute) 5.31 2.00 - 7.15 K/uL    Lymphs (Absolute) 1.93 1.00 - 4.80 K/uL    Monos (Absolute) 0.57 0.00 - 0.85 K/uL    Eos (Absolute) 0.00 0.00 - 0.51 K/uL    Baso (Absolute) 0.02 0.00 - 0.12 K/uL    Immature Granulocytes (abs) 0.03 0.00 - 0.11 K/uL    NRBC (Absolute) 0.00 K/uL   BASIC METABOLIC PANEL   Result Value Ref Range    Sodium 134 (L) 135 - 145 mmol/L    Potassium 3.5 (L) 3.6 - 5.5 mmol/L    Chloride 97 96 - 112 mmol/L    Co2 23 20 - 33 mmol/L    Glucose 80 65 - 99 mg/dL    Bun 4 (L) 8 - 22 mg/dL    Creatinine 0.46 (L) 0.50 - 1.40 mg/dL    Calcium 8.8 8.5 - 10.5 mg/dL    Anion Gap 14.0 7.0 - 16.0   ESTIMATED GFR   Result Value Ref Range    GFR If African American >60 >60 mL/min/1.73 m 2    GFR If Non African American >60 >60 mL/min/1.73 m 2         RADIOLOGY  [id number] VIEW   Final Result      1. No kinking or  discontinuation of the catheter.      DX-SPINE-ANY ONE VIEW   Final Result      1. No kinking or discontinuation of the catheter.      DX-CHEST-2 VIEWS   Final Result      1. No kinking or discontinuation of the catheter.      DX-SKULL-LIMITED 3-   Final Result      1. No kinking or discontinuation of the catheter.      CT-HEAD W/O   Final Result         1. No acute intracranial abnormality. No evidence of acute intracranial hemorrhage or mass lesion.                       COURSE & MEDICAL DECISION MAKING  Pertinent Labs & Imaging studies reviewed. (See chart for details)  10:43 PM  CT head with no enlargement of ventricles or other acute change.    11:07 PM  X-ray shows no discontinuity or kinking of the  catheter    12:14 AM  Rechecked patient, she states her headache has significantly improved after ketamine and she would like to go home.    51-year-old female with history of  shunt and migraines presenting with headache similar to her usual migraines but worse.  Patient with normal vital signs and a nonfocal neuro exam.  No history of fevers to suspect shunt infection.  CT shows a stable ventricles and no evidence of ventriculomegaly new mass, or intracranial hemorrhage.  No discontinuity or kinks of her catheter.  Headache improved with her usual migraine medicines (ketamine and IVF), so feel this is likely her usual migraine.  Labs were unremarkable.  Discharged home with return precautions.    @HYDRATION: Based on the patient's presentation of Other migraine the patient was given IV fluids. IV Hydration was used because oral hydration was not as rapid as required. Upon recheck following hydration, the patient was improved.@    EMSS    The patient will not drink alcohol nor drive with prescribed medications. The patient will return for worsening symptoms and is stable at the time of discharge. The patient verbalizes understanding and will comply.    FINAL IMPRESSION  1. Other migraine without status  migrainosus, not intractable           Electronically signed by: Michelle Brandt M.D., 11/6/2021 9:55 PM

## 2021-11-07 NOTE — DISCHARGE INSTRUCTIONS
Continue to use your normal medications and migraine medications at home.  Return to the emergency department if you have worsening symptoms, focal weakness, fevers, vomiting, or other concerns.

## 2021-11-07 NOTE — ED TRIAGE NOTES
Chief Complaint   Patient presents with   • Migraine     head ache pain started to get worse around noon, pain 8/10, pt took benadryl and toradol 5 hours ago but it did not help, pt states she has a shunt in her brain and headache feel pressure related     Pt ambulatory to triage for above complaint. Pt states she always feels like she has a headache in the back of her head but today the pain got worse.      Pt is alert/oriented and follows commands. Pt speaking in full sentences and responds appropriately to questions. No acute distress noted in triage and respirations are even and unlabored.     Pt placed in lobby and educated on triage process. Pt encouraged to alert staff for any changes in condition.

## 2021-11-28 NOTE — CARE PLAN
Problem: Communication  Goal: The ability to communicate needs accurately and effectively will improve  Outcome: PROGRESSING AS EXPECTED     Problem: Safety  Goal: Will remain free from injury  Outcome: PROGRESSING AS EXPECTED  Goal: Will remain free from falls  Outcome: PROGRESSING AS EXPECTED     Problem: Infection  Goal: Will remain free from infection  Outcome: PROGRESSING AS EXPECTED     Problem: Venous Thromboembolism (VTW)/Deep Vein Thrombosis (DVT) Prevention:  Goal: Patient will participate in Venous Thrombosis (VTE)/Deep Vein Thrombosis (DVT)Prevention Measures  Outcome: PROGRESSING AS EXPECTED     Problem: Bowel/Gastric:  Goal: Normal bowel function is maintained or improved  Outcome: PROGRESSING AS EXPECTED  Goal: Will not experience complications related to bowel motility  Outcome: PROGRESSING AS EXPECTED     Problem: Pain Management  Goal: Pain level will decrease to patient's comfort goal  Outcome: PROGRESSING SLOWER THAN EXPECTED  Note:   Patient still requiring demerol IV and prior to demerol being given pt. Reporting pain level of 8-10/10     
  Problem: Communication  Goal: The ability to communicate needs accurately and effectively will improve  Outcome: PROGRESSING AS EXPECTED  Note:   Pt will communicate needs with staff. Call light within reach. Hourly rounding in place. Pt verbalizes understanding.      Problem: Safety  Goal: Will remain free from falls  Outcome: PROGRESSING AS EXPECTED  Note:   Pt a low fall risk. Arm band on. Safety precautions in place. Pt verbalizes understanding. Bed alarm on.      
  Problem: Infection  Goal: Will remain free from infection  Outcome: PROGRESSING AS EXPECTED  Note:   Hand hygiene completed before and after patient care. Pt educated about infection prevention and verbalized understanding. RN follows all protocols for infection prevention.     Problem: Venous Thromboembolism (VTW)/Deep Vein Thrombosis (DVT) Prevention:  Goal: Patient will participate in Venous Thrombosis (VTE)/Deep Vein Thrombosis (DVT)Prevention Measures  Outcome: PROGRESSING AS EXPECTED  Note:   Pt educated on VTE/DVT prevention measures and verbalized understanding. Pt has heparin SQ for VTE/DVT prevention     
  Problem: Safety  Goal: Will remain free from injury  Outcome: PROGRESSING AS EXPECTED  Goal: Will remain free from falls  Outcome: PROGRESSING AS EXPECTED  Note:   Patient has had no falls this shift. Pt. Using the call light appropriately.      
sudden onset

## 2021-12-26 NOTE — PROGRESS NOTES
Med rec complete per pt at bedside  Pt has IM Toradol and Benadryl that she uses  At home for migraines  Allergies reviewed  Pt finished a 5 day course of Keflex on 12/24/16   4 = No assist / stand by assistance

## 2022-04-30 ENCOUNTER — APPOINTMENT (OUTPATIENT)
Dept: RADIOLOGY | Facility: MEDICAL CENTER | Age: 52
End: 2022-04-30
Attending: EMERGENCY MEDICINE
Payer: MEDICARE

## 2022-04-30 ENCOUNTER — HOSPITAL ENCOUNTER (EMERGENCY)
Facility: MEDICAL CENTER | Age: 52
End: 2022-04-30
Attending: EMERGENCY MEDICINE
Payer: MEDICARE

## 2022-04-30 VITALS
OXYGEN SATURATION: 91 % | SYSTOLIC BLOOD PRESSURE: 128 MMHG | BODY MASS INDEX: 19.31 KG/M2 | HEIGHT: 63 IN | TEMPERATURE: 96.7 F | WEIGHT: 109 LBS | RESPIRATION RATE: 16 BRPM | DIASTOLIC BLOOD PRESSURE: 74 MMHG | HEART RATE: 107 BPM

## 2022-04-30 DIAGNOSIS — G43.809 OTHER MIGRAINE WITHOUT STATUS MIGRAINOSUS, NOT INTRACTABLE: ICD-10-CM

## 2022-04-30 DIAGNOSIS — E87.6 HYPOKALEMIA: ICD-10-CM

## 2022-04-30 LAB
ALBUMIN SERPL BCP-MCNC: 4.3 G/DL (ref 3.2–4.9)
ALBUMIN/GLOB SERPL: 1.7 G/DL
ALP SERPL-CCNC: 141 U/L (ref 30–99)
ALT SERPL-CCNC: 30 U/L (ref 2–50)
AMPHET UR QL SCN: NEGATIVE
ANION GAP SERPL CALC-SCNC: 12 MMOL/L (ref 7–16)
APPEARANCE UR: CLEAR
AST SERPL-CCNC: 43 U/L (ref 12–45)
BARBITURATES UR QL SCN: NEGATIVE
BASOPHILS # BLD AUTO: 0.3 % (ref 0–1.8)
BASOPHILS # BLD: 0.02 K/UL (ref 0–0.12)
BENZODIAZ UR QL SCN: NEGATIVE
BILIRUB SERPL-MCNC: 0.3 MG/DL (ref 0.1–1.5)
BILIRUB UR QL STRIP.AUTO: NEGATIVE
BUN SERPL-MCNC: 5 MG/DL (ref 8–22)
BZE UR QL SCN: NEGATIVE
CALCIUM SERPL-MCNC: 9 MG/DL (ref 8.5–10.5)
CANNABINOIDS UR QL SCN: NEGATIVE
CHLORIDE SERPL-SCNC: 102 MMOL/L (ref 96–112)
CO2 SERPL-SCNC: 24 MMOL/L (ref 20–33)
COLOR UR: YELLOW
CREAT SERPL-MCNC: 0.55 MG/DL (ref 0.5–1.4)
EKG IMPRESSION: NORMAL
EOSINOPHIL # BLD AUTO: 0 K/UL (ref 0–0.51)
EOSINOPHIL NFR BLD: 0 % (ref 0–6.9)
ERYTHROCYTE [DISTWIDTH] IN BLOOD BY AUTOMATED COUNT: 43.7 FL (ref 35.9–50)
ETHANOL BLD-MCNC: <10.1 MG/DL (ref 0–10)
GFR SERPLBLD CREATININE-BSD FMLA CKD-EPI: 112 ML/MIN/1.73 M 2
GLOBULIN SER CALC-MCNC: 2.5 G/DL (ref 1.9–3.5)
GLUCOSE SERPL-MCNC: 101 MG/DL (ref 65–99)
GLUCOSE UR STRIP.AUTO-MCNC: NEGATIVE MG/DL
HCG SERPL QL: NEGATIVE
HCT VFR BLD AUTO: 40.8 % (ref 37–47)
HGB BLD-MCNC: 14.1 G/DL (ref 12–16)
IMM GRANULOCYTES # BLD AUTO: 0.03 K/UL (ref 0–0.11)
IMM GRANULOCYTES NFR BLD AUTO: 0.5 % (ref 0–0.9)
KETONES UR STRIP.AUTO-MCNC: NEGATIVE MG/DL
LACTATE BLD-SCNC: 0.7 MMOL/L (ref 0.5–2)
LEUKOCYTE ESTERASE UR QL STRIP.AUTO: NEGATIVE
LYMPHOCYTES # BLD AUTO: 1.39 K/UL (ref 1–4.8)
LYMPHOCYTES NFR BLD: 22 % (ref 22–41)
MAGNESIUM SERPL-MCNC: 1.8 MG/DL (ref 1.5–2.5)
MCH RBC QN AUTO: 31.1 PG (ref 27–33)
MCHC RBC AUTO-ENTMCNC: 34.6 G/DL (ref 33.6–35)
MCV RBC AUTO: 89.9 FL (ref 81.4–97.8)
METHADONE UR QL SCN: NEGATIVE
MICRO URNS: NORMAL
MONOCYTES # BLD AUTO: 0.37 K/UL (ref 0–0.85)
MONOCYTES NFR BLD AUTO: 5.9 % (ref 0–13.4)
NEUTROPHILS # BLD AUTO: 4.51 K/UL (ref 2–7.15)
NEUTROPHILS NFR BLD: 71.3 % (ref 44–72)
NITRITE UR QL STRIP.AUTO: NEGATIVE
NRBC # BLD AUTO: 0 K/UL
NRBC BLD-RTO: 0 /100 WBC
OPIATES UR QL SCN: NEGATIVE
OXYCODONE UR QL SCN: NEGATIVE
PCP UR QL SCN: NEGATIVE
PH UR STRIP.AUTO: 6.5 [PH] (ref 5–8)
PLATELET # BLD AUTO: 346 K/UL (ref 164–446)
PMV BLD AUTO: 9 FL (ref 9–12.9)
POTASSIUM SERPL-SCNC: 2.7 MMOL/L (ref 3.6–5.5)
PROPOXYPH UR QL SCN: NEGATIVE
PROT SERPL-MCNC: 6.8 G/DL (ref 6–8.2)
PROT UR QL STRIP: NEGATIVE MG/DL
RBC # BLD AUTO: 4.54 M/UL (ref 4.2–5.4)
RBC UR QL AUTO: NEGATIVE
SODIUM SERPL-SCNC: 138 MMOL/L (ref 135–145)
SP GR UR STRIP.AUTO: 1
UROBILINOGEN UR STRIP.AUTO-MCNC: 0.2 MG/DL
WBC # BLD AUTO: 6.3 K/UL (ref 4.8–10.8)

## 2022-04-30 PROCEDURE — 83735 ASSAY OF MAGNESIUM: CPT

## 2022-04-30 PROCEDURE — 93005 ELECTROCARDIOGRAM TRACING: CPT | Performed by: EMERGENCY MEDICINE

## 2022-04-30 PROCEDURE — 93005 ELECTROCARDIOGRAM TRACING: CPT

## 2022-04-30 PROCEDURE — 70450 CT HEAD/BRAIN W/O DYE: CPT | Mod: ME

## 2022-04-30 PROCEDURE — 87086 URINE CULTURE/COLONY COUNT: CPT

## 2022-04-30 PROCEDURE — 82962 GLUCOSE BLOOD TEST: CPT

## 2022-04-30 PROCEDURE — 81003 URINALYSIS AUTO W/O SCOPE: CPT | Mod: XU

## 2022-04-30 PROCEDURE — 85025 COMPLETE CBC W/AUTO DIFF WBC: CPT

## 2022-04-30 PROCEDURE — 700102 HCHG RX REV CODE 250 W/ 637 OVERRIDE(OP): Performed by: EMERGENCY MEDICINE

## 2022-04-30 PROCEDURE — 80053 COMPREHEN METABOLIC PANEL: CPT

## 2022-04-30 PROCEDURE — 83605 ASSAY OF LACTIC ACID: CPT

## 2022-04-30 PROCEDURE — 36415 COLL VENOUS BLD VENIPUNCTURE: CPT

## 2022-04-30 PROCEDURE — 96376 TX/PRO/DX INJ SAME DRUG ADON: CPT

## 2022-04-30 PROCEDURE — 96374 THER/PROPH/DIAG INJ IV PUSH: CPT

## 2022-04-30 PROCEDURE — 700105 HCHG RX REV CODE 258: Performed by: EMERGENCY MEDICINE

## 2022-04-30 PROCEDURE — A9270 NON-COVERED ITEM OR SERVICE: HCPCS | Performed by: EMERGENCY MEDICINE

## 2022-04-30 PROCEDURE — 71045 X-RAY EXAM CHEST 1 VIEW: CPT

## 2022-04-30 PROCEDURE — 700111 HCHG RX REV CODE 636 W/ 250 OVERRIDE (IP): Performed by: EMERGENCY MEDICINE

## 2022-04-30 PROCEDURE — 80307 DRUG TEST PRSMV CHEM ANLYZR: CPT

## 2022-04-30 PROCEDURE — 96375 TX/PRO/DX INJ NEW DRUG ADDON: CPT

## 2022-04-30 PROCEDURE — 84703 CHORIONIC GONADOTROPIN ASSAY: CPT

## 2022-04-30 PROCEDURE — 99285 EMERGENCY DEPT VISIT HI MDM: CPT

## 2022-04-30 PROCEDURE — 82077 ASSAY SPEC XCP UR&BREATH IA: CPT

## 2022-04-30 PROCEDURE — 87040 BLOOD CULTURE FOR BACTERIA: CPT

## 2022-04-30 RX ORDER — KETOROLAC TROMETHAMINE 30 MG/ML
30 INJECTION, SOLUTION INTRAMUSCULAR; INTRAVENOUS ONCE
Status: COMPLETED | OUTPATIENT
Start: 2022-04-30 | End: 2022-04-30

## 2022-04-30 RX ORDER — DIPHENHYDRAMINE HYDROCHLORIDE 50 MG/ML
25 INJECTION INTRAMUSCULAR; INTRAVENOUS ONCE
Status: COMPLETED | OUTPATIENT
Start: 2022-04-30 | End: 2022-04-30

## 2022-04-30 RX ORDER — METOCLOPRAMIDE HYDROCHLORIDE 5 MG/ML
10 INJECTION INTRAMUSCULAR; INTRAVENOUS ONCE
Status: COMPLETED | OUTPATIENT
Start: 2022-04-30 | End: 2022-04-30

## 2022-04-30 RX ORDER — POTASSIUM CHLORIDE 20 MEQ/1
40 TABLET, EXTENDED RELEASE ORAL ONCE
Status: COMPLETED | OUTPATIENT
Start: 2022-04-30 | End: 2022-04-30

## 2022-04-30 RX ORDER — SODIUM CHLORIDE, SODIUM LACTATE, POTASSIUM CHLORIDE, CALCIUM CHLORIDE 600; 310; 30; 20 MG/100ML; MG/100ML; MG/100ML; MG/100ML
1000 INJECTION, SOLUTION INTRAVENOUS ONCE
Status: COMPLETED | OUTPATIENT
Start: 2022-04-30 | End: 2022-04-30

## 2022-04-30 RX ORDER — POTASSIUM CHLORIDE 20 MEQ/1
20 TABLET, EXTENDED RELEASE ORAL DAILY
Qty: 30 TABLET | Refills: 0 | Status: SHIPPED | OUTPATIENT
Start: 2022-04-30 | End: 2022-05-30

## 2022-04-30 RX ADMIN — POTASSIUM CHLORIDE 40 MEQ: 1500 TABLET, EXTENDED RELEASE ORAL at 14:56

## 2022-04-30 RX ADMIN — KETOROLAC TROMETHAMINE 30 MG: 30 INJECTION, SOLUTION INTRAMUSCULAR at 16:21

## 2022-04-30 RX ADMIN — DIPHENHYDRAMINE HYDROCHLORIDE 25 MG: 50 INJECTION INTRAMUSCULAR; INTRAVENOUS at 14:37

## 2022-04-30 RX ADMIN — METOCLOPRAMIDE 10 MG: 5 INJECTION, SOLUTION INTRAMUSCULAR; INTRAVENOUS at 14:36

## 2022-04-30 RX ADMIN — SODIUM CHLORIDE, POTASSIUM CHLORIDE, SODIUM LACTATE AND CALCIUM CHLORIDE 1000 ML: 600; 310; 30; 20 INJECTION, SOLUTION INTRAVENOUS at 14:37

## 2022-04-30 RX ADMIN — DIPHENHYDRAMINE HYDROCHLORIDE 25 MG: 50 INJECTION INTRAMUSCULAR; INTRAVENOUS at 16:21

## 2022-04-30 ASSESSMENT — FIBROSIS 4 INDEX: FIB4 SCORE: 0.95

## 2022-04-30 NOTE — ED TRIAGE NOTES
Chief Complaint   Patient presents with   • Altered Mental Status   • Fall     Landed buttock and back   • Drooling     Onset yesterday at 1730     Pt bib  with above complaints.   fsbs 104.  Pt's  said that he noted she was drooling yesterday .   Pt appears drowsy but awakens to voice.   History of hydrocephalus and migraine.   No unilateral weakness or facial droop noted.   Pt taken to red 2, gave report to Linda FIGUEROA

## 2022-04-30 NOTE — ED PROVIDER NOTES
ED Provider Note        Primary care provider: Elliott Power M.D.    I verified that the patient was wearing a mask and I was wearing appropriate PPE every time I entered the room. The patient's mask was on the patient at all times during my encounter except for a brief view of the oropharynx.      CHIEF COMPLAINT  Chief Complaint   Patient presents with   • Altered Mental Status   • Fall     Landed buttock and back   • Drooling     Onset yesterday at 1730       HPI  Enedelia Pang is a 51 y.o. female who presents to the Emergency Department with chief complaint of headache and drooling.  Patient has a history of previous hydrocephalus with  shunt in place.  She reports that she does get severe migraines.  She has had what she describes as her usual migraine over the last several days its been not alleviated by her home medications.  She normally takes Benadryl and Toradol at home she has had no relief.  She also reports that she has had some slight drooling over the last couple days that was first noted by her .  No altered mental status no confusion she had a fall and landed on her buttocks yesterday.  She has no chest pain or shortness of breath no neck pain no abdominal pain no problems with urination or bowel movements no fevers no chills no other acute symptoms or concerns at this time    REVIEW OF SYSTEMS  10 systems reviewed and otherwise negative, pertinent positives and negatives listed in the history of present illness.    PAST MEDICAL HISTORY   has a past medical history of Allergy, unspecified not elsewhere classified, Anemia (10/05/2017), Anesthesia, Anxiety, Anxiety, generalized, Arthritis, autoimmune, Autoimmune disorder (HCC), Back pain, Clostridium difficile diarrhea (2014), Depression, Elevated liver enzymes (2017), Fall, H/O Clostridium difficile infection (2014), MRSA infection (2003), Hydrocephalus ([facility]) (2015), Hyponatremia (9/28/2019), Joint pain (09/10/2019), Migraine with  "aura, with intractable migraine, so stated, without mention of status migrainosus, MRSA (methicillin resistant Staphylococcus aureus) (08/2015), MRSA (methicillin resistant Staphylococcus aureus) (12/2017), MRSA (methicillin resistant Staphylococcus aureus) (2018), Pituitary abnormality ([facility]) (2002), Port or reservoir infection (10/3/2015), Requires supplemental oxygen, Seizure ([facility]) (started 2017), Sepsis ([facility]) (8/30/2015), Staph infection, and Stroke ([facility]) (2007).    SURGICAL HISTORY   has a past surgical history that includes tonsillectomy (1985); other neurological surg ([id number]); irrigation & debridement neuro (N/A, 6/28/2015); lumbar exploration (N/A, 8/31/2015); spinal cord stimulator (12/19/2016);  shunt insertion (5/31/2017); liver biopsy (07/24/2017); cholecystectomy (2001); appendectomy (1982); spinal cord stimulator (05/24/2018); spinal cord stimulator (2003); knee arthroscopy (Right, 1990); full thickness skin graft (Left, 04/2018); other surgical procedure (11/10/2017); other neurological surg (08/2015); endometrial ablation (2001); tubal coagulation laparoscopic bilateral (Bilateral, 2001); implant neurostim/ (9/13/2019); other (Left, 01/23/2020); thoracoscopy,dx no bx (Right, 4/28/2021); decortication (Right, 4/28/2021); thoracotomy (Right, 5/17/2021); and decortication (5/17/2021).    SOCIAL HISTORY  Social History     Tobacco Use   • Smoking status: Never Smoker   • Smokeless tobacco: Never Used   Vaping Use   • Vaping Use: Never used   Substance Use Topics   • Alcohol use: Not Currently   • Drug use: Never      Social History     Substance and Sexual Activity   Drug Use Never       FAMILY HISTORY  Non-Contributory    CURRENT MEDICATIONS  Home Medications    **Home medications have not yet been reviewed for this encounter**         ALLERGIES  Allergies   Allergen Reactions   • Sumatriptan Anaphylaxis     Historical=\"Heart stops.\" Reaction  between 1995 to 1997   • Prochlorperazine Rash " "and Swelling     Tongue swelling. Reaction as a teen. (compazine)  Tolerated Phenergan on 02/24/15   • Vancomycin Shortness of Breath and Rash     Reaction in 2005.  D/W patient 8/31/15 - tolerated loading dose of vancomycin administered on 8/30/15 with some itching to chest with decreased infusion rate. Red Man Syndrome.  2018-tolerated slow drip with benadryl pre-med-had no problems.       PHYSICAL EXAM  VITAL SIGNS: /88   Pulse (!) 133   Temp 35.9 °C (96.6 °F) (Temporal)   Resp 18   Ht 1.6 m (5' 3\")   Wt 49.4 kg (109 lb)   SpO2 98%   BMI 19.31 kg/m²   Pulse ox interpretation: I interpret this pulse ox as normal.  Constitutional: Alert and oriented x 3, minimal distress  HEENT: Atraumatic normocephalic, pupils are equal round, extraocular movements are intact. The nares is clear, external ears are normal, mouth shows moist mucous membranes  Neck: no obvious JVD or tracheal deviation  Cardiovascular: tachycardic no murmur rub or gallop   Thorax & Lungs: No respiratory distress, no wheezes rales or rhonchi, No chest tenderness.   GI: Soft nontender nondistended positive bowel sounds, no peritoneal signs  Skin: Warm dry no obvious acute rash or lesion  Musculoskeletal: Moving all extremities with normal range strength, no acute  deformity  Neurologic: Cranial nerves III through XII are grossly intact, no sensory deficit, no cerebellar dysfunction   Psychiatric: Appropriate affect for situation at this time      DIAGNOSTIC STUDIES / PROCEDURES  LABS      Results for orders placed or performed during the hospital encounter of 04/30/22   CBC WITH DIFFERENTIAL   Result Value Ref Range    WBC 6.3 4.8 - 10.8 K/uL    RBC 4.54 4.20 - 5.40 M/uL    Hemoglobin 14.1 12.0 - 16.0 g/dL    Hematocrit 40.8 37.0 - 47.0 %    MCV 89.9 81.4 - 97.8 fL    MCH 31.1 27.0 - 33.0 pg    MCHC 34.6 33.6 - 35.0 g/dL    RDW 43.7 35.9 - 50.0 fL    Platelet Count 346 164 - 446 K/uL    MPV 9.0 9.0 - 12.9 fL    Neutrophils-Polys 71.30 " 44.00 - 72.00 %    Lymphocytes 22.00 22.00 - 41.00 %    Monocytes 5.90 0.00 - 13.40 %    Eosinophils 0.00 0.00 - 6.90 %    Basophils 0.30 0.00 - 1.80 %    Immature Granulocytes 0.50 0.00 - 0.90 %    Nucleated RBC 0.00 /100 WBC    Neutrophils (Absolute) 4.51 2.00 - 7.15 K/uL    Lymphs (Absolute) 1.39 1.00 - 4.80 K/uL    Monos (Absolute) 0.37 0.00 - 0.85 K/uL    Eos (Absolute) 0.00 0.00 - 0.51 K/uL    Baso (Absolute) 0.02 0.00 - 0.12 K/uL    Immature Granulocytes (abs) 0.03 0.00 - 0.11 K/uL    NRBC (Absolute) 0.00 K/uL   COMP METABOLIC PANEL   Result Value Ref Range    Sodium 138 135 - 145 mmol/L    Potassium 2.7 (LL) 3.6 - 5.5 mmol/L    Chloride 102 96 - 112 mmol/L    Co2 24 20 - 33 mmol/L    Anion Gap 12.0 7.0 - 16.0    Glucose 101 (H) 65 - 99 mg/dL    Bun 5 (L) 8 - 22 mg/dL    Creatinine 0.55 0.50 - 1.40 mg/dL    Calcium 9.0 8.5 - 10.5 mg/dL    AST(SGOT) 43 12 - 45 U/L    ALT(SGPT) 30 2 - 50 U/L    Alkaline Phosphatase 141 (H) 30 - 99 U/L    Total Bilirubin 0.3 0.1 - 1.5 mg/dL    Albumin 4.3 3.2 - 4.9 g/dL    Total Protein 6.8 6.0 - 8.2 g/dL    Globulin 2.5 1.9 - 3.5 g/dL    A-G Ratio 1.7 g/dL   HCG QUAL SERUM   Result Value Ref Range    Beta-Hcg Qualitative Serum Negative Negative   DIAGNOSTIC ALCOHOL   Result Value Ref Range    Diagnostic Alcohol <10.1 0.0 - 10.0 mg/dL   URINE DRUG SCREEN (TRIAGE)   Result Value Ref Range    Amphetamines Urine Negative Negative    Barbiturates Negative Negative    Benzodiazepines Negative Negative    Cocaine Metabolite Negative Negative    Methadone Negative Negative    Opiates Negative Negative    Oxycodone Negative Negative    Phencyclidine -Pcp Negative Negative    Propoxyphene Negative Negative    Cannabinoid Metab Negative Negative   LACTIC ACID   Result Value Ref Range    Lactic Acid 0.7 0.5 - 2.0 mmol/L   URINALYSIS    Specimen: Urine   Result Value Ref Range    Color Yellow     Character Clear     Specific Gravity 1.003 <1.035    Ph 6.5 5.0 - 8.0    Glucose Negative  Negative mg/dL    Ketones Negative Negative mg/dL    Protein Negative Negative mg/dL    Bilirubin Negative Negative    Urobilinogen, Urine 0.2 Negative    Nitrite Negative Negative    Leukocyte Esterase Negative Negative    Occult Blood Negative Negative    Micro Urine Req see below    ESTIMATED GFR   Result Value Ref Range    GFR (CKD-EPI) 112 >60 mL/min/1.73 m 2   MAGNESIUM   Result Value Ref Range    Magnesium 1.8 1.5 - 2.5 mg/dL   EKG (NOW)   Result Value Ref Range    Report       [facility] Emergency Dept.    Test Date:  2022  Pt Name:    BRIGID DAWSON                 Department: ER  MRN:        [id number]                      Room:  Gender:     Female                       Technician: [id number]  :        1970                   Requested By:ER TRIAGE PROTOCOL  Order #:    [id number]                    Reading MD:    Measurements  Intervals                                Axis  Rate:       120                          P:          81  VA:         156                          QRS:        -33  QRSD:       95                           T:          36  QT:         312  QTc:        441    Interpretive Statements  Sinus tachycardia  Paired ventricular premature complexes  Aberrant complex  Prominent P waves, nondiagnostic  Probable left ventricular hypertrophy  Compared to ECG 2021 08:53:14  Ventricular premature complex(es) now present  Aberrant conduction of supraventricular beat(s) now present  Intraventricular conduction delay no longe r present         All labs reviewed by me.      RADIOLOGY  CT-HEAD W/O   Final Result      No acute intracranial abnormality.                  DX-CHEST-PORTABLE (1 VIEW)   Final Result      No acute cardiopulmonary abnormality.            COURSE & MEDICAL DECISION MAKING  Pertinent Labs & Imaging studies reviewed. (See chart for details)    1:25 PM - Patient seen and examined at bedside.         Patient noted to have slightly elevated blood pressure  "likely circumstantial secondary to presenting complaint. Referred to primary care physician for further evaluation.    Patient was given IV fluids based on tachycardia dry mucous membranes concern for sepsis/treatment of headache., oral hydration was not attempted due to insufficiency for hydration, after fluids had improvement of symptoms and vital signs    Medical Decision Making: Feeling much better her head CT is negative chest x-ray is clear she did have sepsis protocol work-up for due to her abnormal vital signs at arrival.  This has been grossly unrevealing of any acute process or toxicity.  She is feeling better at this time after final dose of Toradol she requested discharge so that she can go home.  Still slightly tachycardic however upon review she is often tachycardic in this realm she is feeling improved she is given instructions return for any worsening symptoms or concerns.  She was hypokalemic she was given prescription for Klor-Con.  She is given instruction to follow-up with her primary care doctor this week to return here for any worsening headache altered mental status nausea vomiting fevers chills any other acute symptoms change or concerns otherwise discharged stable and improved condition.    /74   Pulse (!) 107   Temp 35.9 °C (96.7 °F) (Temporal)   Resp 16   Ht 1.6 m (5' 3\")   Wt 49.4 kg (109 lb)   SpO2 91%   BMI 19.31 kg/m²     Elliott Power M.D.  [address] [facility] [address]  [phone]          [facility], Emergency Dept  [address] [facility] [address] [phone]    in 12-24 hours if symptoms persist, immediately If symptoms worsen, or if you develop any other symptoms or concerns      Discharge Medication List as of 4/30/2022  4:30 PM          FINAL IMPRESSION  1. Other migraine without status migrainosus, not intractable Active   2. Hypokalemia           This dictation has been created using voice recognition software and/or " scribes. The accuracy of the dictation is limited by the abilities of the software and the expertise of the scribes. I expect there may be some errors of grammar and possibly content. I made every attempt to manually correct the errors within my dictation. However, errors related to voice recognition software and/or scribes may still exist and should be interpreted within the appropriate context.

## 2022-04-30 NOTE — ED NOTES
Patient discharged home per ERP.  Discharge teaching and education discussed with patient. POC discussed.   Patient verbalized understanding of discharge teaching and education. No other questions at this time.     RX x 1 sent to pharmacy by ERP.   PIV removed.     VSS. Patient alert and oriented. Patient arranged ride for self. Able to ambulate off unit safely with steady gait.

## 2022-04-30 NOTE — ED NOTES
Tech from Lab called with critical result of K 2.7 at 1409. Critical lab result read back to .   Dr. Tanner notified of critical lab result at 1410.  Critical lab result read back by Dr. Tanner.

## 2022-05-02 LAB
BACTERIA UR CULT: NORMAL
GLUCOSE BLD STRIP.AUTO-MCNC: 104 MG/DL (ref 65–99)
SIGNIFICANT IND 70042: NORMAL
SITE SITE: NORMAL
SOURCE SOURCE: NORMAL

## 2022-07-14 ENCOUNTER — APPOINTMENT (RX ONLY)
Dept: URBAN - METROPOLITAN AREA CLINIC 22 | Facility: CLINIC | Age: 52
Setting detail: DERMATOLOGY
End: 2022-07-14

## 2022-07-14 DIAGNOSIS — Z71.89 OTHER SPECIFIED COUNSELING: ICD-10-CM

## 2022-07-14 DIAGNOSIS — D18.0 HEMANGIOMA: ICD-10-CM

## 2022-07-14 DIAGNOSIS — D22 MELANOCYTIC NEVI: ICD-10-CM

## 2022-07-14 DIAGNOSIS — L82.1 OTHER SEBORRHEIC KERATOSIS: ICD-10-CM

## 2022-07-14 DIAGNOSIS — L81.4 OTHER MELANIN HYPERPIGMENTATION: ICD-10-CM

## 2022-07-14 PROBLEM — D18.01 HEMANGIOMA OF SKIN AND SUBCUTANEOUS TISSUE: Status: ACTIVE | Noted: 2022-07-14

## 2022-07-14 PROBLEM — D22.5 MELANOCYTIC NEVI OF TRUNK: Status: ACTIVE | Noted: 2022-07-14

## 2022-07-14 PROCEDURE — 99203 OFFICE O/P NEW LOW 30 MIN: CPT

## 2022-07-14 PROCEDURE — ? SUNSCREEN RECOMMENDATIONS

## 2022-07-14 PROCEDURE — ? COUNSELING

## 2022-07-14 ASSESSMENT — LOCATION SIMPLE DESCRIPTION DERM
LOCATION SIMPLE: LEFT UPPER BACK
LOCATION SIMPLE: ABDOMEN
LOCATION SIMPLE: RIGHT UPPER BACK
LOCATION SIMPLE: LEFT FOREARM

## 2022-07-14 ASSESSMENT — LOCATION ZONE DERM
LOCATION ZONE: ARM
LOCATION ZONE: TRUNK

## 2022-07-14 ASSESSMENT — LOCATION DETAILED DESCRIPTION DERM
LOCATION DETAILED: LEFT INFERIOR UPPER BACK
LOCATION DETAILED: RIGHT MID-UPPER BACK
LOCATION DETAILED: LEFT LATERAL ABDOMEN
LOCATION DETAILED: LEFT PROXIMAL DORSAL FOREARM

## 2022-11-03 ENCOUNTER — PATIENT MESSAGE (OUTPATIENT)
Dept: HEALTH INFORMATION MANAGEMENT | Facility: OTHER | Age: 52
End: 2022-11-03

## 2023-02-11 NOTE — PROGRESS NOTES
MS  ST 90-120s  0.14/0.08/0.28   Report given to CNU RUSS De Anda, pt ready for transfer. Transport taking pt CNU, belongings packed and sent with pt.

## 2023-04-26 NOTE — CARE PLAN
Problem: Nutritional:  Goal: Achieve adequate nutritional intake  Description: Patient will consume 50% of meals  Outcome: Met   PO %, generally >75% meals documented since last RD follow up. Will continue oral nutrition supplement regimen at this time and transition to weekly monitoring at this time.    RD available PRN.    "Pain Service Progress Note  [facility]  Date: 04/26/2023       Patient Name: Reinaldo Gordillo  MRN: [id number]  Age: 77 year old  Sex: male      Assessment:  Reinaldo Gordillo is a 77 year old male with PMH significant for CAD with VT s/p AREN x 4 in RCA, mild aortic stenosis, HTN, HLD, GERD    Procedure: s/p CABG x 4    Date of Surgery: 4/24/23     Date of Catheter Placement: 4/24/23     Plan/Recommendations:  1. Regional Anesthesia/Analgesia  -Continuous Catheter Type/Site: bilateral erector spinae (ES) T7-8  Infusate: 0.2% Ropivacaine  Programmed Intermittent Bolus (PIB) at 7 mL Q60 min via each catheter, total infusion rate of 14 mL/hr    Plan to maintain catheters while chest tubes in place, max of 7 days    2. Anticoagulation  -Please contact Inpatient Pain Service before ordering or making any anticoagulation changes     3. Multimodal Analgesia  - per primary service      Pain Service will continue to follow.    Discussed with attending anesthesiologist    SARITA Garza CNP  04/26/2023     Overnight Events: No acute events    Tubes/Drains: Yes  3 chest tubes    Subjective:  The Tylenol and Block seem to be working, I don't have much pain.  Pleasant an talkative. Visiting with friend.   Nausea: No  Vomiting: No  Symptoms of LAST: No    Pain Location:  Chest tube sites    Pain Intensity:    Pain at Rest: 1/10   Pain with Activity: 2/10  Satisfied with your level of pain control: Yes    Diet: Advance Diet as Tolerated: Regular Diet Adult    Relevant Labs:  Recent Labs   Lab Test 04/26/23  0728 04/26/23  0609 04/25/23  1942 04/25/23  0645 04/24/23  2142 04/24/23  1518   INR  --   --   --  1.27*  --  1.31*   *  --   --   --    < > 171   PTT  --   --   --   --   --  42*   BUN  --  13.6   < >  --    < > 12.7    < > = values in this interval not displayed.       Physical Exam:  Vitals: BP 95/65   Pulse 75   Temp 98  F (36.7  C) (Axillary)   Resp 12   Ht 1.88 m (6' 2\")   " Wt 92.1 kg (203 lb 0.7 oz)   SpO2 94%   BMI 26.07 kg/m      Physical Exam:   Orientation:  Alert, oriented, and in no acute distress: Yes  Sedation: No    Motor Examination:  5/5 Strength in lower extremities: Yes    Catheter Site:   Catheter entry site is clean/dry/intact: Yes    Tender: No      Relevant Medications:  Current Pain Medications:  Medications related to Pain Management (From now, onward)    Start     Dose/Rate Route Frequency Ordered Stop    04/27/23 0000  acetaminophen (TYLENOL) tablet 650 mg         650 mg Oral EVERY 4 HOURS PRN 04/24/23 1515      04/26/23 1000  senna-docusate (SENOKOT-S/PERICOLACE) 8.6-50 MG per tablet 3 tablet         3 tablet Oral 2 TIMES DAILY 04/26/23 0632      04/26/23 0800  magnesium hydroxide (MILK OF MAGNESIA) suspension 30 mL         30 mL Oral or Feeding Tube DAILY 04/26/23 0632      04/26/23 0631  magnesium hydroxide (MILK OF MAGNESIA) suspension 30 mL         30 mL Oral DAILY PRN 04/26/23 0632      04/25/23 0800  aspirin (ASA) chewable tablet 162 mg         162 mg Oral or NG Tube DAILY 04/25/23 0606      04/24/23 2000  polyethylene glycol (MIRALAX) Packet 17 g         17 g Oral or Feeding Tube 2 TIMES DAILY 04/24/23 1535      04/24/23 1600  acetaminophen (TYLENOL) tablet 975 mg         975 mg Oral or Feeding Tube EVERY 8 HOURS 04/24/23 1515 04/27/23 1559    04/24/23 1515  HYDROmorphone (DILAUDID) injection 0.2 mg        See Hyperspace for full Linked Orders Report.    0.2 mg Intravenous EVERY 2 HOURS PRN 04/24/23 1515      04/24/23 1515  HYDROmorphone (DILAUDID) injection 0.4 mg        See Hyperspace for full Linked Orders Report.    0.4 mg Intravenous EVERY 2 HOURS PRN 04/24/23 1515      04/24/23 1515  oxyCODONE (ROXICODONE) tablet 5 mg        See Hyperspace for full Linked Orders Report.    5 mg Oral EVERY 4 HOURS PRN 04/24/23 1515      04/24/23 1515  oxyCODONE IR (ROXICODONE) tablet 10 mg        See Hyperspace for full Linked Orders Report.    10 mg Oral EVERY 4  "HOURS PRN 04/24/23 1515      04/24/23 1515  bisacodyl (DULCOLAX) suppository 10 mg         10 mg Rectal DAILY PRN 04/24/23 1515      04/24/23 1515  lidocaine 1 % 0.1-1 mL         0.1-1 mL Other EVERY 1 HOUR PRN 04/24/23 1515      04/24/23 1515  lidocaine (LMX4) cream          Topical EVERY 1 HOUR PRN 04/24/23 1515      04/24/23 1030  ropivacaine 0.2% in NS perineural infusion (simple)          Perineural Continuous Nerve Block 04/24/23 1010      04/24/23 1030  ropivacaine 0.2% in NS perineural infusion (simple)          Perineural Continuous Nerve Block 04/24/23 1010            Primary Service Contacted with Recommendations? No      Please see A&P for additional details of medical decision making.      Acute Inpatient Pain Service [facility]  Hours of pain coverage 24/7   Page via Amcom- Please Page the Pain Team Via Amcom: \"PAIN MANAGEMENT ACUTE INPATIENT/ [facility]\"             "

## 2023-06-08 NOTE — PROCEDURES
Procedures  DATE OF OPERATION: 4/20/2021     PREOPERATIVE DIAGNOSIS: respiratory failure with hypoxia, pneumonia, increased tracheal secretions/hemoptysis, sepsis and chest xray abnormality.    POSTOPERATIVE DIAGNOSIS: same, mucus plugs    PROCEDURE PERFORMED: Fiberoptic bronchoscopy with bronchoalveolar lavage    SURGEON: Viraj Burgos M.D.    ANESTHESIA: Intravenous sedation, analgesia, and pharmacologic restraint     INDICATIONS: The patient is a 50 y.o. female with respiratory failure with hypoxia, pneumonia, increased tracheal secretions/hemoptysis, sepsis and chest xray abnormality.    FINDINGS: Bilateral thick and bloody secretions with some mucus plugging in all airway including right main stem, left main stem, RUL, RML, RLL, HYACINTH and LLL airway segments.  Most significant in RLL, RML, RUL and LLL.  Bronchoalveolar lavage of a RLL lobe segment, specimen sent for culture.  No endobronchial lesions noted.  Edema present in the airway segments.   Endotracheal tube near gerard, pulled back 1 cm.        SPECIMEN: Bronchoalveolar lavage for cultures    PROCEDURE: Following informed consent per mother and , the patient was properly identified and optimally positioned in bed. She was preoxygenated with 100% oxygen and placed on a regular ventilatory rate. Intravenous sedation, analgesia, and pharmacologic restraint was administered.    The fiberoptic bronchoscope was advanced through the indwelling endotracheal tube.  The upper airways were suctioned. The airways were systematically and sequentially inspected and lavaged including the RLL, RML, RLL, HYACINTH and LLL. Bronchoalveolar lavage of a RLL and RUL segment, 40 ml normal saline insipated into each segment separately, lavaged and suctioned into a sterile trap and sent for cultures.      The patient tolerated the procedure well. There were no apparent complications.    ____________________________________   Viraj Burgos M.D.    DD: 4/20/2021  4:38  PM         (2) Male

## 2023-11-02 ENCOUNTER — APPOINTMENT (OUTPATIENT)
Dept: RADIOLOGY | Facility: MEDICAL CENTER | Age: 53
End: 2023-11-02
Attending: STUDENT IN AN ORGANIZED HEALTH CARE EDUCATION/TRAINING PROGRAM
Payer: MEDICARE

## 2023-11-02 ENCOUNTER — HOSPITAL ENCOUNTER (EMERGENCY)
Facility: MEDICAL CENTER | Age: 53
End: 2023-11-03
Attending: STUDENT IN AN ORGANIZED HEALTH CARE EDUCATION/TRAINING PROGRAM
Payer: MEDICARE

## 2023-11-02 DIAGNOSIS — R53.1 WEAKNESS: ICD-10-CM

## 2023-11-02 DIAGNOSIS — R51.9 RIGHT-SIDED HEADACHE: ICD-10-CM

## 2023-11-02 LAB
ALBUMIN SERPL BCP-MCNC: 5.1 G/DL (ref 3.2–4.9)
ALBUMIN/GLOB SERPL: 1.8 G/DL
ALP SERPL-CCNC: 100 U/L (ref 30–99)
ALT SERPL-CCNC: 31 U/L (ref 2–50)
ANION GAP SERPL CALC-SCNC: 14 MMOL/L (ref 7–16)
APTT PPP: 26.8 SEC (ref 24.7–36)
AST SERPL-CCNC: 47 U/L (ref 12–45)
BASOPHILS # BLD AUTO: 0.2 % (ref 0–1.8)
BASOPHILS # BLD: 0.01 K/UL (ref 0–0.12)
BILIRUB SERPL-MCNC: 0.3 MG/DL (ref 0.1–1.5)
BUN SERPL-MCNC: 13 MG/DL (ref 8–22)
CALCIUM ALBUM COR SERPL-MCNC: 8.5 MG/DL (ref 8.5–10.5)
CALCIUM SERPL-MCNC: 9.4 MG/DL (ref 8.5–10.5)
CHLORIDE SERPL-SCNC: 100 MMOL/L (ref 96–112)
CO2 SERPL-SCNC: 20 MMOL/L (ref 20–33)
CREAT SERPL-MCNC: 0.41 MG/DL (ref 0.5–1.4)
EOSINOPHIL # BLD AUTO: 0 K/UL (ref 0–0.51)
EOSINOPHIL NFR BLD: 0 % (ref 0–6.9)
ERYTHROCYTE [DISTWIDTH] IN BLOOD BY AUTOMATED COUNT: 43.8 FL (ref 35.9–50)
GFR SERPLBLD CREATININE-BSD FMLA CKD-EPI: 119 ML/MIN/1.73 M 2
GLOBULIN SER CALC-MCNC: 2.8 G/DL (ref 1.9–3.5)
GLUCOSE SERPL-MCNC: 113 MG/DL (ref 65–99)
HCT VFR BLD AUTO: 44.6 % (ref 37–47)
HGB BLD-MCNC: 14.8 G/DL (ref 12–16)
IMM GRANULOCYTES # BLD AUTO: 0.01 K/UL (ref 0–0.11)
IMM GRANULOCYTES NFR BLD AUTO: 0.2 % (ref 0–0.9)
INR PPP: 1.06 (ref 0.87–1.13)
LACTATE SERPL-SCNC: 1.2 MMOL/L (ref 0.5–2)
LYMPHOCYTES # BLD AUTO: 0.61 K/UL (ref 1–4.8)
LYMPHOCYTES NFR BLD: 12.7 % (ref 22–41)
MCH RBC QN AUTO: 30.9 PG (ref 27–33)
MCHC RBC AUTO-ENTMCNC: 33.2 G/DL (ref 32.2–35.5)
MCV RBC AUTO: 93.1 FL (ref 81.4–97.8)
MONOCYTES # BLD AUTO: 0.27 K/UL (ref 0–0.85)
MONOCYTES NFR BLD AUTO: 5.6 % (ref 0–13.4)
NEUTROPHILS # BLD AUTO: 3.89 K/UL (ref 1.82–7.42)
NEUTROPHILS NFR BLD: 81.3 % (ref 44–72)
NRBC # BLD AUTO: 0 K/UL
NRBC BLD-RTO: 0 /100 WBC (ref 0–0.2)
PLATELET # BLD AUTO: 320 K/UL (ref 164–446)
PMV BLD AUTO: 9 FL (ref 9–12.9)
POTASSIUM SERPL-SCNC: 4.7 MMOL/L (ref 3.6–5.5)
PROT SERPL-MCNC: 7.9 G/DL (ref 6–8.2)
PROTHROMBIN TIME: 13.9 SEC (ref 12–14.6)
RBC # BLD AUTO: 4.79 M/UL (ref 4.2–5.4)
SODIUM SERPL-SCNC: 134 MMOL/L (ref 135–145)
WBC # BLD AUTO: 4.8 K/UL (ref 4.8–10.8)

## 2023-11-02 PROCEDURE — 85610 PROTHROMBIN TIME: CPT

## 2023-11-02 PROCEDURE — 87186 SC STD MICRODIL/AGAR DIL: CPT

## 2023-11-02 PROCEDURE — 700111 HCHG RX REV CODE 636 W/ 250 OVERRIDE (IP): Performed by: STUDENT IN AN ORGANIZED HEALTH CARE EDUCATION/TRAINING PROGRAM

## 2023-11-02 PROCEDURE — 96375 TX/PRO/DX INJ NEW DRUG ADDON: CPT

## 2023-11-02 PROCEDURE — 70498 CT ANGIOGRAPHY NECK: CPT

## 2023-11-02 PROCEDURE — 80053 COMPREHEN METABOLIC PANEL: CPT

## 2023-11-02 PROCEDURE — 93005 ELECTROCARDIOGRAM TRACING: CPT | Performed by: STUDENT IN AN ORGANIZED HEALTH CARE EDUCATION/TRAINING PROGRAM

## 2023-11-02 PROCEDURE — 85730 THROMBOPLASTIN TIME PARTIAL: CPT

## 2023-11-02 PROCEDURE — 0042T CT-CEREBRAL PERFUSION ANALYSIS: CPT

## 2023-11-02 PROCEDURE — 700117 HCHG RX CONTRAST REV CODE 255: Performed by: STUDENT IN AN ORGANIZED HEALTH CARE EDUCATION/TRAINING PROGRAM

## 2023-11-02 PROCEDURE — 72040 X-RAY EXAM NECK SPINE 2-3 VW: CPT

## 2023-11-02 PROCEDURE — 74018 RADEX ABDOMEN 1 VIEW: CPT

## 2023-11-02 PROCEDURE — 99285 EMERGENCY DEPT VISIT HI MDM: CPT

## 2023-11-02 PROCEDURE — 71045 X-RAY EXAM CHEST 1 VIEW: CPT

## 2023-11-02 PROCEDURE — 87040 BLOOD CULTURE FOR BACTERIA: CPT

## 2023-11-02 PROCEDURE — 700105 HCHG RX REV CODE 258: Performed by: STUDENT IN AN ORGANIZED HEALTH CARE EDUCATION/TRAINING PROGRAM

## 2023-11-02 PROCEDURE — 87077 CULTURE AEROBIC IDENTIFY: CPT

## 2023-11-02 PROCEDURE — 87150 DNA/RNA AMPLIFIED PROBE: CPT | Mod: 91

## 2023-11-02 PROCEDURE — 70250 X-RAY EXAM OF SKULL: CPT

## 2023-11-02 PROCEDURE — 83605 ASSAY OF LACTIC ACID: CPT

## 2023-11-02 PROCEDURE — 36415 COLL VENOUS BLD VENIPUNCTURE: CPT

## 2023-11-02 PROCEDURE — 85025 COMPLETE CBC W/AUTO DIFF WBC: CPT

## 2023-11-02 PROCEDURE — 70496 CT ANGIOGRAPHY HEAD: CPT

## 2023-11-02 PROCEDURE — 96374 THER/PROPH/DIAG INJ IV PUSH: CPT

## 2023-11-02 RX ORDER — PROCHLORPERAZINE EDISYLATE 5 MG/ML
10 INJECTION INTRAMUSCULAR; INTRAVENOUS ONCE
Status: COMPLETED | OUTPATIENT
Start: 2023-11-02 | End: 2023-11-02

## 2023-11-02 RX ORDER — PROMETHAZINE HYDROCHLORIDE 6.25 MG/5ML
12.5 SYRUP ORAL ONCE
Status: COMPLETED | OUTPATIENT
Start: 2023-11-02 | End: 2023-11-02

## 2023-11-02 RX ORDER — HYDROMORPHONE HYDROCHLORIDE 1 MG/ML
0.5 INJECTION, SOLUTION INTRAMUSCULAR; INTRAVENOUS; SUBCUTANEOUS ONCE
Status: COMPLETED | OUTPATIENT
Start: 2023-11-02 | End: 2023-11-02

## 2023-11-02 RX ORDER — SODIUM CHLORIDE 9 MG/ML
1000 INJECTION, SOLUTION INTRAVENOUS ONCE
Status: COMPLETED | OUTPATIENT
Start: 2023-11-02 | End: 2023-11-02

## 2023-11-02 RX ORDER — KETOROLAC TROMETHAMINE 30 MG/ML
15 INJECTION, SOLUTION INTRAMUSCULAR; INTRAVENOUS ONCE
Status: COMPLETED | OUTPATIENT
Start: 2023-11-02 | End: 2023-11-02

## 2023-11-02 RX ADMIN — IOHEXOL 80 ML: 350 INJECTION, SOLUTION INTRAVENOUS at 21:51

## 2023-11-02 RX ADMIN — SODIUM CHLORIDE 1000 ML: 9 INJECTION, SOLUTION INTRAVENOUS at 22:13

## 2023-11-02 RX ADMIN — HYDROMORPHONE HYDROCHLORIDE 0.5 MG: 1 INJECTION, SOLUTION INTRAMUSCULAR; INTRAVENOUS; SUBCUTANEOUS at 23:40

## 2023-11-02 RX ADMIN — IOHEXOL 40 ML: 350 INJECTION, SOLUTION INTRAVENOUS at 21:46

## 2023-11-02 RX ADMIN — PROMETHAZINE HYDROCHLORIDE 12.5 MG: 6.25 SOLUTION ORAL at 22:56

## 2023-11-02 RX ADMIN — PROCHLORPERAZINE EDISYLATE 10 MG: 5 INJECTION INTRAMUSCULAR; INTRAVENOUS at 22:13

## 2023-11-02 RX ADMIN — KETOROLAC TROMETHAMINE 15 MG: 30 INJECTION, SOLUTION INTRAMUSCULAR; INTRAVENOUS at 23:39

## 2023-11-02 ASSESSMENT — FIBROSIS 4 INDEX: FIB4 SCORE: 1.16

## 2023-11-03 VITALS
RESPIRATION RATE: 17 BRPM | BODY MASS INDEX: 19.31 KG/M2 | TEMPERATURE: 98.3 F | HEIGHT: 63 IN | WEIGHT: 109 LBS | OXYGEN SATURATION: 95 % | DIASTOLIC BLOOD PRESSURE: 61 MMHG | HEART RATE: 111 BPM | SYSTOLIC BLOOD PRESSURE: 103 MMHG

## 2023-11-03 LAB — EKG IMPRESSION: NORMAL

## 2023-11-03 NOTE — ED NOTES
Bedside report received from off going RN, Cristina, assumed care of patient.  POC discussed with patient. Call light within reach, all needs addressed at this time. Previous RN reports she has messaged social work for cab voucher.       Fall risk interventions in place: Keep floor surfaces clean and dry and Accompanied to restroom (all applicable per [facility] Fall risk assessment)   Continuous monitoring: Cardiac Leads, Pulse Ox, or Blood Pressure  IVF/IV medications: Not Applicable   Oxygen: Room Air  Bedside sitter: Not Applicable   Isolation: Not Applicable

## 2023-11-03 NOTE — ED PROVIDER NOTES
ER Provider Note    Scribed for Nahid Agustin M.D. by Jesús Johnson. 11/2/2023   9:02 PM    Primary Care Provider: Elliott Power M.D.    CHIEF COMPLAINT  Chief Complaint   Patient presents with    Headache     Started 1 hour ago. Pt reports severe headache & nausea. States she had a stroke 2 weeks ag & was seen at [facility], admitted & has residual R sided deficits. Mild R sided weakness noted.    Slurred Speech     Pt does not have a facial droop but has slurred speech, having twitching movements to her tongue. States it started about 1 hour ago. Pt looks unwell, diaphoretic.      EXTERNAL RECORDS REVIEWED  Inpatient Notes reviewed admission notes from [facility], patient was worked up for potential CVA, although she refused MRI, work-up was reassuring, she was ultimately discharged.  Patient has had numerous similar presentations to this emergency department.  She also has notably always mildly tachycardic.  They also note she is tremulous frequently.    HPI/ROS    LIMITATION TO HISTORY   Select: : None    Enedelia Pang is a 53 y.o. female who presents to the ED for evaluation of headache onset one hour ago. She states that she is having a migraine as she is having a lot of pain, localized to the left side of her head. She has had this pain before but it has not been this bad before. She sometimes has weakness when she has a migraine. She has taken naproxen for her symptoms. Before her migraine started she was feeling normal. She denies any cough, nausea, vomiting, abdominal pain or shortness of breath. She is normally able to walk around and care for herself. She also has acute changes in her speech, her voice sounds garbled. She endorses that she was admitted at [facility] two weeks ago for stroke, with residual right sided weakness.     PAST MEDICAL HISTORY  Past Medical History:   Diagnosis Date    Allergy, unspecified not elsewhere classified     Anemia 10/05/2017    Unsure if a  current issue.    Anesthesia     Migrane after anesthesia    Anxiety     Anxiety, generalized     Arthritis     autoimmune     Autoimmune disorder (HCC)     Unknown etiology or type    Back pain     Clostridium difficile diarrhea 2014    follow up tests negative    Depression     Elevated liver enzymes 2017    Related to meds.    Fall     H/O Clostridium difficile infection 2014    Hx MRSA infection 2003    with neurostimulator implant    Hydrocephalus (HCC) 2015    with lumbar shunt    Hyponatremia 9/28/2019    Joint pain 09/10/2019    Especially right shoulder    Migraine with aura, with intractable migraine, so stated, without mention of status migrainosus     from undefined autoimmune issue    MRSA (methicillin resistant Staphylococcus aureus) 08/2015    to lumbar shunt and power port    MRSA (methicillin resistant Staphylococcus aureus) 12/2017    left foot autoimmune decay    MRSA (methicillin resistant Staphylococcus aureus) 2018    to neurostimulator.     Pituitary abnormality (HCC) 2002    Port or reservoir infection 10/3/2015    Requires supplemental oxygen     to treat migraines. 2-5L/NC    Seizure ([facility]) started 2017    Unknown etiology-not sure if related to autoimmune issue. Last seizure 2/2019    Sepsis (HCC) 8/30/2015    Staph infection     Stroke ([facility]) 2007     with right side residual weakness       SURGICAL HISTORY  Past Surgical History:   Procedure Laterality Date    THORACOTOMY Right 5/17/2021    Procedure: THORACOTOMY;  Surgeon: John H Ganser, M.D.;  Location: SURGERY [facility];  Service: General    DECORTICATION  5/17/2021    Procedure: DECORTICATION, LUNG.;  Surgeon: John H Ganser, M.D.;  Location: SURGERY [facility];  Service: General    LA THORACOSCOPY,DX NO BX Right 4/28/2021    Procedure: THORACOSCOPY;  Surgeon: John H Ganser, M.D.;  Location: SURGERY [facility];  Service: General    DECORTICATION Right 4/28/2021    Procedure: DECORTICATION, LUNG;  Surgeon: John H Ganser, M.D.;   Location: SURGERY [facility];  Service: General    OTHER Left 2020    Port placement left chest    SC IMPLANT NEUROSTIM/  2019    Procedure: INSERTION, NEUROSTIMULATOR, PERMANENT, SPINAL CORD;  Surgeon: Seven Mahoney M.D.;  Location: [facility];  Service: Pain Management    SPINAL CORD STIMULATOR  2018    Explanted    FULL THICKNESS SKIN GRAFT Left 2018     graft to foot (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    OTHER SURGICAL PROCEDURE  11/10/2017    Power port    LIVER BIOPSY  2017     SHUNT INSERTION  2017    Procedure:  SHUNT INSERTION;  Surgeon: Drew Berry M.D.;  Location: [facility];  Service:     SPINAL CORD STIMULATOR  2016    Procedure: SPINAL CORD STIMULATOR FOR: PLACEMENT OF LEFT FLANK OCCIPITAL NERVE STIMULATOR BATTERY PACK;  Surgeon: Oli Oh III, M.D.;  Location: [facility];  Service:     LUMBAR EXPLORATION N/A 2015    Procedure: LUMBAR EXPLORATION- Lumbar shunt removal ;  Surgeon: Oli Oh III, M.D.;  Location: [facility];  Service:     OTHER NEUROLOGICAL SURG  2015    Explanted lumbar shunt and explanted power port due to MRSA    IRRIGATION & DEBRIDEMENT NEURO N/A 2015    Procedure: IRRIGATION & DEBRIDEMENT NEURO;  Surgeon: Oli Oh III, M.D.;  Location: [facility];  Service:     SPINAL CORD STIMULATOR      1st implant    CHOLECYSTECTOMY      had ruptured day before    ENDOMETRIAL ABLATION      TUBAL COAGULATION LAPAROSCOPIC BILATERAL Bilateral     KNEE ARTHROSCOPY Right 1990    TONSILLECTOMY  1985    APPENDECTOMY  1982    OTHER NEUROLOGICAL SURG  [id number]    occipital nerve stimulator-revisions for total of 9 procedures       FAMILY HISTORY  Family History   Problem Relation Age of Onset    Multiple Sclerosis Mother     Diabetes Father     Alcohol abuse Father        SOCIAL HISTORY   reports that she has  "never smoked. She has never used smokeless tobacco. She reports that she does not currently use alcohol. She reports that she does not use drugs.    CURRENT MEDICATIONS  Previous Medications    AIMOVIG 70 MG/ML SOLUTION AUTO-INJECTOR    Inject 70 mg under the skin Q30 DAYS.    ASPIRIN 81 MG EC TABLET    Take 81 mg by mouth every morning.    DIPHENHYDRAMINE (BENADRYL) 50 MG/ML SOLUTION    Inject 75 mg into the shoulder, thigh, or buttocks 3 times a day as needed (Migraine).    DIVALPROEX ER (DEPAKOTE ER) 250 MG TABLET SR 24 HR    Take 500 mg by mouth 3 times a day. 2 tablets = 500 mg.    DULOXETINE (CYMBALTA) 60 MG CAP DR PARTICLES DELAYED-RELEASE CAPSULE    Take 60 mg by mouth 2 times a day.    GABAPENTIN (NEURONTIN) 300 MG CAP    Take 1 capsule by mouth every 8 hours.    MULTIPLE VITAMINS-MINERALS (WOMENS MULTIVITAMIN) TAB    Take 1 tablet by mouth 2 times a day. \"Equate Women's Multivitamin/Multimineral\"    PAROXETINE (PAXIL) 10 MG TAB    Take 10 mg by mouth every morning.    RISPERIDONE (RISPERDAL) 2 MG TAB    Take 2 mg by mouth 2 times a day.    UBROGEPANT (UBRELVY) 100 MG TAB    Take 100 mg by mouth 2 times a day as needed (Migraine). May take a 2nd dose after 4 hours if migraine persists. Max of 2 tablets in 24 hours.       ALLERGIES  Allergies   Allergen Reactions    Sumatriptan Anaphylaxis     Historical=\"Heart stops.\" Reaction  between 1995 to 1997    Prochlorperazine Rash and Swelling     Tongue swelling. Reaction as a teen. (compazine)  Tolerated Phenergan on 02/24/15    Vancomycin Shortness of Breath and Rash     Reaction in 2005.  D/W patient 8/31/15 - tolerated loading dose of vancomycin administered on 8/30/15 with some itching to chest with decreased infusion rate. Red Man Syndrome.  2018-tolerated slow drip with benadryl pre-med-had no problems.        PHYSICAL EXAM  BP (!) 169/93   Pulse (!) 117   Temp 36.7 °C (98 °F) (Axillary)   Resp 19   Ht 1.6 m (5' 3\")   Wt 49.4 kg (109 lb)   SpO2 96%   " BMI 19.31 kg/m²    General: Mildly diaphoretic, chronically ill appearing woman lying on bed  Head: Normocephalic atraumatic  Eyes: Extraocular motion intact  Neck: Supple, no rigidity. Healed tracheostomy scars  Cardiovascular: Regular rate and rhythm no murmurs rubs or gallops  Respiratory: Clear to auscultation bilaterally, equal chest rise and fall, no increased work of breathing  Abdomen: Soft nontender no guarding, multiple healed scars on thorax  Musculoskeletal: Warm and well perfused, no peripheral edema  Neuro: Alert, no focal deficits. 4+/5 strength on the left, CN 2-12 intact. No facial droop. Tremors present NEURO: Cranial nerves II through XII intact.  4+/5 strengthon left with elbow flexion/tension, wrist flexion/extension,  5/5 on right..  5 out of 5 strength with hip flexion/extension, knee flexion/distention, ankle flexion/plantarflexion, flexion/extension of the toes bilaterally.  Sensation intact light touch x4. Alert and oriented x3.  2+ DTRs bilaterally.   No pronator drift.  Integumentary: No wounds or rashes     DIAGNOSTIC STUDIES    Labs:   Labs Reviewed   COMP METABOLIC PANEL - Abnormal; Notable for the following components:       Result Value    Sodium 134 (*)     Glucose 113 (*)     Creatinine 0.41 (*)     AST(SGOT) 47 (*)     Alkaline Phosphatase 100 (*)     Albumin 5.1 (*)     All other components within normal limits    Narrative:     Indicate which anticoagulants the patient is on:->UNKNOWN   CBC WITH DIFFERENTIAL - Abnormal; Notable for the following components:    Neutrophils-Polys 81.30 (*)     Lymphocytes 12.70 (*)     Lymphs (Absolute) 0.61 (*)     All other components within normal limits    Narrative:     Indicate which anticoagulants the patient is on:->UNKNOWN   LACTIC ACID   APTT    Narrative:     Indicate which anticoagulants the patient is on:->UNKNOWN   PROTHROMBIN TIME    Narrative:     Indicate which anticoagulants the patient is on:->UNKNOWN   ESTIMATED GFR     "Narrative:     Indicate which anticoagulants the patient is on:->UNKNOWN   BLOOD CULTURE    Narrative:     Per Hospital Policy: Only change Specimen Src: to \"Line\" if  specified by physician order.  Release to patient->Immediate   BLOOD CULTURE   No significant electrolyte abnormality outside of mild hyponatremia which has previously been attributed to her chronic headaches, neutrophilic predominance without left shift, lactate unremarkable, remainder of laboratory testing reassuring.  All labs reviewed by me.     EKG:   I have independently interpreted this EKG as detailed above.  Results for orders placed or performed during the hospital encounter of 23   EKG (NOW)   Result Value Ref Range    Report       [facility]    Test Date:  2023  Pt Name:    BRIGID DAWSON                 Department: ER  MRN:        [id number]                      Room:       BL 14  Gender:     Female                       Technician: [id number]  :        [id number]                   Requested By:MAN BARBOZA  Order #:    [id number]                    Reading MD: Man Barboza    Measurements  Intervals                                Axis  Rate:       106                          P:          75  MN:         148                          QRS:        26  QRSD:       84                           T:          50  QT:         343  QTc:        456    Interpretive Statements  Sinus tachycardia, rate of 106, left axis deviation, no ST elevations or  depressions. Improvement compared to prior 2022.  Electronically Signed On 2023 02:38:24 PDT by Man Barboza           Radiology:     Radiologist interpretation:   DX-CHEST-LIMITED (1 VIEW)   Final Result         1.  No acute cardiopulmonary disease.   2.  Ventriculostomy tube without visualized catheter disruption.      LO-HPAWBJT-3 VIEW   Final Result         1.  Nonspecific bowel gas pattern in the upper abdomen.   2.  Ventriculostomy tube without " visualized shunt catheter disruption.      DX-CERVICAL SPINE-2 OR 3 VIEWS   Final Result         1.  No acute traumatic bony injury of the cervical spine is appreciated.   2.  Ventriculostomy tube without visualized catheter disruption.      DX-SKULL-LIMITED 3-   Final Result         1.  Ventriculostomy tube without visualized shunt catheter disruption.      CT-CTA NECK WITH & W/O-POST PROCESSING   Final Result         1.  CT angiogram of the neck within normal limits. Examination is limited particularly of the vertebral arteries due to dense contrast reflux within the neck.   2.  Pulmonary nodules, see nodule follow-up recommendations below.   3.  Anterolisthesis C3 on C4 and C4 on C5 with associated multilevel degenerative changes from C3 through C7.      Fleischner Society pulmonary nodule recommendations:   Low Risk: CT at 3-6 months, then consider CT at 18-24 months      High Risk: CT at 3-6 months, then at 18-24 months      Comments: Use most suspicious nodule as guide to management. Follow-up intervals may vary according to size and risk.      Low Risk - Minimal or absent history of smoking and of other known risk factors.      High Risk - History of smoking or of other known risk factors.      Note: These recommendations do not apply to lung cancer screening, patients with immunosuppression, or patients with known primary cancer.      Fleischner Society 2017 Guidelines for Management of Incidentally Detected Pulmonary Nodules in Adults            CT-CTA HEAD WITH & W/O-POST PROCESS   Final Result         1.  No large vessel occlusion or aneurysm identified      CT-CEREBRAL PERFUSION ANALYSIS   Final Result         1.  Cerebral blood flow less than 30% likely representing completed infarct = 0 mL.      2.  T Max more than 6 seconds likely representing combination of completed infarct and ischemia = 0 mL.      3.  Mismatched volume likely representing ischemic brain/penumbra = None      4.  Please note that  the cerebral perfusion was performed on the limited brain tissue around the basal ganglia region. Infarct/ischemia outside the CT perfusion sections can be missed in this study.      Note: Results of this examination are of uncertain significance and sensitivity due to technique. Contrast was followed with second contrast bolus instead of typical saline bolus.         INITIAL ASSESSMENT COURSE AND PLAN  Care Narrative 53-year-old female presenting with left upper extremity weakness for the past several hours, headache, reports similar symptoms with migraine headache previously, has had numerous work-ups for this previously, does have a history of  shunt for pseudotumor cerebri placed 2017, has a nerve stimulator, history of chronic pain syndrome.  She also has a history of endocarditis in 2021 associated with an infected port.  Here, her blood pressure is elevated, her heart rate is mildly elevated though notably it is generally in the 90s to 110s, no fever, otherwise unremarkable.    9:02 PM - Patient was evaluated at bedside. She presents for migraine and slurred speech, reportedly was admitted at [facility] two weeks ago for acute stroke with residual right sided weakness. Appears diaphoretic, tremulous. Ordered for labs, imaging and EKG to evaluate. The patient will be medicated with compazine 10 mg and Phenergan 12.5 mg for her symptoms. Patient verbalizes understanding and support with my plan of care.  Differential diagnoses include but not limited to: Complex migraine, CVA considered, although less likely, shunt complication, hydrocephalus, electrolyte abnormality, arrhythmia, intracranial hemorrhage, infection    ED Observation Status? Yes; I am placing the patient in to an observation status due to a diagnostic uncertainty as well as therapeutic intensity. Patient placed in observation status at 9:02 PM, 11/2/2023.     Observation plan is as follows: Monitor for symptom management and diagnostic results        HYDRATION: Based on the patient's presentation of Tachycardia the patient was given IV fluids. IV Hydration was used because oral hydration was not adequate alone. Upon recheck following hydration, the patient was improved.    During work-up, after migraine cocktail, patient's dysarthria resolved, her weakness resolved, and she was agreeable to discharge, she ambulated with a steady gait, and she is tolerating p.o.  Unfortunately, the patient lives in an apartment, with her , her  has a new phone she does not know the number, she has no wallet, and no safe way to get into the house, given this, patient was placed in ED observation for social work consult, pending safe disposition, and further pain control.    DISPOSITION AND DISCUSSIONS      Discussion of management with other QHP or appropriate source(s): Social Work          FINAL DIAGNOSIS  1. Right-sided headache    2. Weakness Temporary        Jesús MADERA (Scribe), am scribing for, and in the presence of, Man Agustin M.D..    Electronically signed by: Jesús Johnson (Rob), 11/2/2023    Man MADERA M.D. personally performed the services described in this documentation, as scribed by Jesús Johnson in my presence, and it is both accurate and complete.      The note accurately reflects work and decisions made by me.  Man Agustin M.D.  11/3/2023  2:43 AM

## 2023-11-03 NOTE — ED NOTES
"Pt tearful, not speaking full sentences with this RN. When asked if she is crying because her head hurts, pt nods \"yes\". ERP aware.  "

## 2023-11-03 NOTE — ED NOTES
RN noticed pt belongings bag with clothing behind cart, attempted to go to lobby to find pt, but pt had already gotten into cab. Pt sticker placed on belonging bag and placed in lost and found.

## 2023-11-03 NOTE — DISCHARGE INSTRUCTIONS
Medications as prescribed, follow-up with your primary care doctor, return if you develop severe worsening pain, inability eat or drink, other new symptoms you find concerning.

## 2023-11-03 NOTE — ED NOTES
RN unable to get a hold of either family member, pt does not have phone or wallet. RN uncomfortable with discharging pt to cab at this time. Social work, charge RN, and ERP aware of pt and situation.

## 2023-11-03 NOTE — ED TRIAGE NOTES
Enedelia Pang  53 y.o.  female  Chief Complaint   Patient presents with    Headache     Started 1 hour ago. Pt reports severe headache & nausea. States she had a stroke 2 weeks ag & was seen at [facility], admitted & has residual R sided deficits. Mild R sided weakness noted.    Slurred Speech     Pt does not have a facial droop but has slurred speech, having twitching movements to her tongue. States it started about 1 hour ago. Pt looks unwell, diaphoretic.       with EMS. ERP to bedside. Attempting IV access.

## 2023-11-03 NOTE — ED NOTES
Pt requesting to leave at this time, erp okay with pt discharge. Pt provided taxi voucher. Patient given discharge instructions and verbalizes understanding. Left with all belongings and escorted to ED lobby in wheelchair.

## 2023-11-03 NOTE — ED NOTES
"PIV removed. RN to discharge pt when RN noticed pt had incontinence in bed. Pt provided clothing and help dressing. Pt paused for long period of time when pulling up pants and reports to RN that she was \"just struggling\". Pt reports she thinks her  will open up the door for her when she gets home, but does not know his current phone number. RN attempted to call number in chart. RN attempting contact family. RN contacted to social work due to Rn's concern about pt being unsafe for cab ride home.   "

## 2023-11-03 NOTE — DISCHARGE SUMMARY
ED Observation Discharge Summary    Patient:Enedelia Pang  Patient : 1970  Patient MRN: [id number]  Patient PCP: Elliott Power M.D.    Admit Date: 2023  Discharge Date and Time: 23 5:43 AM  Discharge Diagnosis: Headache, weakness now resolved  Discharge Attending: Cornell Muniz M.D.  Discharge Service: ED Observation    ED Course  Enedelia is a 53 y.o. female who was evaluated at [facility] yesterday by my colleague Dr. Craig for evaluation of a right-sided headache and left upper extremity weakness, dysarthria.  Noted history of prior complex migraines with similar presentations and has had multiple prior work-ups for these including history of  shunt placement for pseudotumor cerebri placed in 2017.  Also has a vagal nerve stimulator and history of chronic pain syndrome.  During her initial evaluation given her slurred speech a CT CTA of the head and neck was performed as well as CT perfusion study.  This demonstrated no acute abnormalities.  The patient was medicated with a migraine cocktail with resolution of her symptoms back to baseline.  By the time of planned discharge the patient was unable to return home safely given no access to her apartment.  She was maintained in ED observation status for any change in symptomology overnight with a plan for discharge in the morning if symptoms remain stable.  At the time of my a.m. evaluation the patient reports no ongoing symptoms and that she feels back to her normal.  She is requesting discharge home.  She states that there is a 24-hour security personnel at her facility who will let her in.  I discussed potential options for rides home and we determined that a Medicaid taxi would be most appropriate.  At the time of discharge patient's vitals were baseline.  Most notably she remained tachycardic though per chart review by my colleagues she has a history of chronic persistent tachycardia.  I encouraged her to make an appointment with her  "outpatient care team including her PCP and neurologist as soon as possible to discuss this presentation.  Return precautions discussed and all questions answered and she was discharged in stable condition.    Discharge Exam:  /61   Pulse (!) 111   Temp 36.8 °C (98.3 °F) (Temporal)   Resp 17   Ht 1.6 m (5' 3\")   Wt 49.4 kg (109 lb)   SpO2 95%   BMI 19.31 kg/m² .    Constitutional: Awake and alert. Nontoxic  HENT:  Grossly normal  Eyes: Grossly normal  Neck: Normal range of motion  Cardiovascular: Normal heart rate   Thorax & Lungs: No respiratory distress  Abdomen: Nontender  Skin:  No pathologic rash.   Extremities: Well perfused  Psychiatric: Affect normal    Labs  Results for orders placed or performed during the hospital encounter of 11/02/23   LACTIC ACID   Result Value Ref Range    Lactic Acid 1.2 0.5 - 2.0 mmol/L   CMP   Result Value Ref Range    Sodium 134 (L) 135 - 145 mmol/L    Potassium 4.7 3.6 - 5.5 mmol/L    Chloride 100 96 - 112 mmol/L    Co2 20 20 - 33 mmol/L    Anion Gap 14.0 7.0 - 16.0    Glucose 113 (H) 65 - 99 mg/dL    Bun 13 8 - 22 mg/dL    Creatinine 0.41 (L) 0.50 - 1.40 mg/dL    Calcium 9.4 8.5 - 10.5 mg/dL    Correct Calcium 8.5 8.5 - 10.5 mg/dL    AST(SGOT) 47 (H) 12 - 45 U/L    ALT(SGPT) 31 2 - 50 U/L    Alkaline Phosphatase 100 (H) 30 - 99 U/L    Total Bilirubin 0.3 0.1 - 1.5 mg/dL    Albumin 5.1 (H) 3.2 - 4.9 g/dL    Total Protein 7.9 6.0 - 8.2 g/dL    Globulin 2.8 1.9 - 3.5 g/dL    A-G Ratio 1.8 g/dL   CBC WITH DIFFERENTIAL   Result Value Ref Range    WBC 4.8 4.8 - 10.8 K/uL    RBC 4.79 4.20 - 5.40 M/uL    Hemoglobin 14.8 12.0 - 16.0 g/dL    Hematocrit 44.6 37.0 - 47.0 %    MCV 93.1 81.4 - 97.8 fL    MCH 30.9 27.0 - 33.0 pg    MCHC 33.2 32.2 - 35.5 g/dL    RDW 43.8 35.9 - 50.0 fL    Platelet Count 320 164 - 446 K/uL    MPV 9.0 9.0 - 12.9 fL    Neutrophils-Polys 81.30 (H) 44.00 - 72.00 %    Lymphocytes 12.70 (L) 22.00 - 41.00 %    Monocytes 5.60 0.00 - 13.40 %    Eosinophils " 0.00 0.00 - 6.90 %    Basophils 0.20 0.00 - 1.80 %    Immature Granulocytes 0.20 0.00 - 0.90 %    Nucleated RBC 0.00 0.00 - 0.20 /100 WBC    Neutrophils (Absolute) 3.89 1.82 - 7.42 K/uL    Lymphs (Absolute) 0.61 (L) 1.00 - 4.80 K/uL    Monos (Absolute) 0.27 0.00 - 0.85 K/uL    Eos (Absolute) 0.00 0.00 - 0.51 K/uL    Baso (Absolute) 0.01 0.00 - 0.12 K/uL    Immature Granulocytes (abs) 0.01 0.00 - 0.11 K/uL    NRBC (Absolute) 0.00 K/uL   APTT   Result Value Ref Range    APTT 26.8 24.7 - 36.0 sec   PROTHROMBIN TIME (INR)   Result Value Ref Range    PT 13.9 12.0 - 14.6 sec    INR 1.06 0.87 - 1.13   ESTIMATED GFR   Result Value Ref Range    GFR (CKD-EPI) 119 >60 mL/min/1.73 m 2   EKG (NOW)   Result Value Ref Range    Report       [facility] Emergency Dept.    Test Date:  2023  Pt Name:    BRIGID DAWSON                 Department: ER  MRN:        [id number]                      Room:        14  Gender:     Female                       Technician: [id number]  :        1970                   Requested By:MAN BARBOZA  Order #:    [id number]                    Reading MD: Man Barboza    Measurements  Intervals                                Axis  Rate:       106                          P:          75  NE:         148                          QRS:        26  QRSD:       84                           T:          50  QT:         343  QTc:        456    Interpretive Statements  Sinus tachycardia, rate of 106, left axis deviation, no ST elevations or  depressions. Improvement compared to prior 2022.  Electronically Signed On 2023 02:38:24 PDT by Man Barboza         Radiology  DX-CHEST-LIMITED (1 VIEW)   Final Result         1.  No acute cardiopulmonary disease.   2.  Ventriculostomy tube without visualized catheter disruption.      [id number] VIEW   Final Result         1.  Nonspecific bowel gas pattern in the upper abdomen.   2.  Ventriculostomy tube without visualized shunt  catheter disruption.      DX-CERVICAL SPINE-2 OR 3 VIEWS   Final Result         1.  No acute traumatic bony injury of the cervical spine is appreciated.   2.  Ventriculostomy tube without visualized catheter disruption.      DX-SKULL-LIMITED 3-   Final Result         1.  Ventriculostomy tube without visualized shunt catheter disruption.      CT-CTA NECK WITH & W/O-POST PROCESSING   Final Result         1.  CT angiogram of the neck within normal limits. Examination is limited particularly of the vertebral arteries due to dense contrast reflux within the neck.   2.  Pulmonary nodules, see nodule follow-up recommendations below.   3.  Anterolisthesis C3 on C4 and C4 on C5 with associated multilevel degenerative changes from C3 through C7.      Fleischner Society pulmonary nodule recommendations:   Low Risk: CT at 3-6 months, then consider CT at 18-24 months      High Risk: CT at 3-6 months, then at 18-24 months      Comments: Use most suspicious nodule as guide to management. Follow-up intervals may vary according to size and risk.      Low Risk - Minimal or absent history of smoking and of other known risk factors.      High Risk - History of smoking or of other known risk factors.      Note: These recommendations do not apply to lung cancer screening, patients with immunosuppression, or patients with known primary cancer.      Fleischner Society 2017 Guidelines for Management of Incidentally Detected Pulmonary Nodules in Adults            CT-CTA HEAD WITH & W/O-POST PROCESS   Final Result         1.  No large vessel occlusion or aneurysm identified      CT-CEREBRAL PERFUSION ANALYSIS   Final Result         1.  Cerebral blood flow less than 30% likely representing completed infarct = 0 mL.      2.  T Max more than 6 seconds likely representing combination of completed infarct and ischemia = 0 mL.      3.  Mismatched volume likely representing ischemic brain/penumbra = None      4.  Please note that the cerebral  perfusion was performed on the limited brain tissue around the basal ganglia region. Infarct/ischemia outside the CT perfusion sections can be missed in this study.      Note: Results of this examination are of uncertain significance and sensitivity due to technique. Contrast was followed with second contrast bolus instead of typical saline bolus.          Medications:   New Prescriptions    No medications on file       My final assessment includes complex migraine, transient weakness    Upon Reevaluation, the patient's condition has: Improved; and will be discharged.    Patient discharged from ED Observation status at 5:45 AM (Time) 11/3/2023 (Date).     Total time spent on this ED Observation discharge encounter is > 30 Minutes    Electronically signed by: Cornell Muniz M.D., 11/3/2023 5:43 AM

## 2023-11-03 NOTE — ED NOTES
Pt looks better, headache improved sightly. Pt able to ambulate down the cao independently with standby assistance from ED Tech.

## 2023-11-03 NOTE — ED NOTES
Records received from [facility]. Pt reports no relief of 10/10 headache. Speech appears to easier for pt - no longer slurring words but continues to grunt at times when not speaking.

## 2023-11-04 NOTE — ED NOTES
"ED Positive Culture Follow-up/Notification Note:    Date: 11/4/23     Patient seen in the ED on 11/2/2023 for slurred speech and weakness along with headache. Has a history or migraine,   shunt for pseudotumor cerebri and a vagal nerve stimulator for chronic pain.   1. Right-sided headache    2. Weakness Temporary      Discharge Medication List as of 11/3/2023 12:25 AM          Allergies: Sumatriptan, Prochlorperazine, and Vancomycin     Vitals:    11/03/23 0247 11/03/23 0305 11/03/23 0405 11/03/23 0543   BP: 99/59 100/59 101/54 103/61   Pulse: 97 93 (!) 104 (!) 111   Resp: 16 17 16 17   Temp:       TempSrc:       SpO2: 93% 92% 94% 95%   Weight:       Height:           Final cultures:   Results       Procedure Component Value Units Date/Time    BLOOD CULTURE [[id number]]  (Abnormal) Collected: 11/02/23 2122    Order Status: Completed Specimen: Blood from Peripheral Updated: 11/03/23 1929     Significant Indicator POS     Source BLD     Site PERIPHERAL     Culture Result Growth detected by Bactec instrument. 11/03/2023  17:56  Gram Stain: Gram positive cocci: Possible Staphylococcus sp.  Negative for Staphylococcus aureus and MRSA by PCR. Correlate  ongoing need for antibiotics with clinical condition.  Further report to follow.      Narrative:      Per Hospital Policy: Only change Specimen Src: to \"Line\" if  specified by physician order.  Release to patient->Immediate  No site indicated    BLOOD CULTURE [[id number]] Collected: 11/02/23 0000    Order Status: Canceled Specimen: Other from Peripheral             Plan:   Patient was originally in observation in the ED and discharged without symptoms and after treatment for migraine. Did not have a leukocytosis or fever or other systemic signs of infection. CoNS in the blood (1 set only and is positive in only one bottle) is a likely contaminant.  I did attempt to contact the patient in order to assure she was feeling well, but there was no answer and her VM was not set " up.   Will f/u when cultures finalize.  Mayi Michele, PharmD

## 2023-11-06 LAB
BACTERIA BLD CULT: ABNORMAL
BACTERIA BLD CULT: ABNORMAL
SIGNIFICANT IND 70042: ABNORMAL
SITE SITE: ABNORMAL
SOURCE SOURCE: ABNORMAL

## 2023-11-22 NOTE — TELEPHONE ENCOUNTER
Hospital Follow Up    LOS:  LOS: 27 days   Patient Name: Dee Herrera  Age/Sex: 33 y.o. female  : 1990  MRN: [id number]    Day of Service: 23   Length of Stay: 27  Encounter Provider: CAESAR Michaels  Place of Service: [facility]  Patient Care Team:  Margarita Woods APRN as PCP - General (Nurse Practitioner)  Winnie Sanchez MD as Referring Physician (Obstetrics and Gynecology)  Norberto Almaraz MD PhD as Consulting Physician (Hematology and Oncology)  Lupis Thomas MD as Consulting Physician (Nephrology)  Hair Mckeon MD as Consulting Physician (Nephrology)  Juana Taylor MD as Consulting Physician (Cardiology)    Subjective:     Chief Complaint: follow up aortic aneurysm/dissection, severe AR    Interval History: No complaints fo chest pain or SOA this morning. Episode of chest pain reported last night but patient states she does not remember this. Resolved with nitro x 2. Refusing all PO meds.    Objective:     Objective:  Temp:  [98.1 °F (36.7 °C)-99.9 °F (37.7 °C)] 98.8 °F (37.1 °C)  Heart Rate:  [] 109  Resp:  [14-16] 16  BP: (139-156)/() 139/92     Intake/Output Summary (Last 24 hours) at 2023 0847  Last data filed at 2023 0843  Gross per 24 hour   Intake 4635 ml   Output 4200 ml   Net 435 ml     Body mass index is 17.92 kg/m².      23  0417 23  0500 23  0432   Weight: 54.3 kg (119 lb 9.6 oz) 52.6 kg (115 lb 14.4 oz) 48.8 kg (107 lb 9.4 oz)     Weight change: -3.772 kg (-8 lb 5.1 oz)    Physical Exam:   General Appearance:    Awake alert and oriented, flat affect and withdrawn   Color:  Skin:  Neuro:  HEENT:    Lungs:     Pink  Warm and dry  No focal, motor or sensory deficits  Neck supple, pupils equal, round and reactive. No JVD, No Bruit  Clear to auscultation,respirations regular, even and                  unlabored    Heart:    Regular rate and rhythm, S1 and S2, + sys murmur,  Mailed out xray orders to PT   "no gallop, no rub. No edema, DP/PT pulses are 2+   Chest Wall:    Midsternal incision clean and dry   Abdomen:     Normal bowel sounds, no masses, no organomegaly, soft        non-tender, non-distended, no guarding, no ascites noted   Extremities:   Moves all extremities well, no edema, no cyanosis, no redness       Lab Review:   Results from last 7 days   Lab Units 11/22/23  0550 11/21/23  1718 11/21/23  0401 11/19/23  0308 11/18/23  0330   SODIUM mmol/L 127*  --  128*   < > 130*   POTASSIUM mmol/L 4.0 3.6 3.7   < > 3.1*   CHLORIDE mmol/L 88*  --  90*   < > 94*   CO2 mmol/L 27.5  --  25.1   < > 26.0   BUN mg/dL 43*  --  35*   < > 29*   CREATININE mg/dL 4.88*  --  5.37*   < > 4.64*   GLUCOSE mg/dL 112*  --  120*   < > 158*   CALCIUM mg/dL 8.8  --  8.6   < > 8.6   AST (SGOT) U/L  --   --   --   --  34*   ALT (SGPT) U/L  --   --   --   --  21    < > = values in this interval not displayed.     Results from last 7 days   Lab Units 11/22/23  0550 11/21/23  2114 11/21/23  1718   HSTROP T ng/L 438* 417* 407*     Results from last 7 days   Lab Units 11/22/23  0550 11/21/23  0713   WBC 10*3/mm3 12.10* 7.95   HEMOGLOBIN g/dL 10.1* 9.9*   HEMATOCRIT % 30.1* 30.3*   PLATELETS 10*3/mm3 235 210         Results from last 7 days   Lab Units 11/22/23  0550 11/21/23  1718   MAGNESIUM mg/dL 2.2 1.5*           Invalid input(s): \"LDLCALC\"      Results from last 7 days   Lab Units 11/21/23  0401   TSH uIU/mL 4.220*       I reviewed the patient's new clinical results.  I personally viewed and interpreted the patient's EKG  Current Medications:   Scheduled Meds:aspirin, 81 mg, Nasogastric, Daily  carvedilol, 25 mg, Nasogastric, Q12H  chlorhexidine, 15 mL, Mouth/Throat, Q12H  Delflex-LC/2.5% Dextrose, 2,000 mL, Intraperitoneal, 5 Exchanges Daily - Dwell Overnight  escitalopram, 10 mg, Nasogastric, Daily  First Mouthwash (Magic Mouthwash), 10 mL, Swish & Spit, Q6H  insulin regular, 2-7 Units, Subcutaneous, Q6H  Lacosamide, 100 mg, " Intravenous, Q12H  lactulose, 10 g, Nasogastric, TID  lidocaine, 10 mL, Subcutaneous, Once  mupirocin, , Each Nare, BID  pantoprazole, 40 mg, Intravenous, Q AM      Continuous Infusions:hold, 1 each        Allergies:  Allergies   Allergen Reactions    Minoxidil Hives and Other (See Comments)     Pericardial effusion .       Assessment:       Dissecting ascending aortic aneurysm    Vitamin D deficiency    Thrombocytopenia    Hypertension secondary to other renal disorders    Pancytopenia    Systemic lupus erythematosus    Pericardial effusion    End stage renal disease on dialysis    Seizure disorder    Elevated liver function tests    Essential hypertension    Peritoneal dialysis catheter in place    Anemia due to chronic kidney disease, on chronic dialysis    Hyponatremia    Poor appetite    Moderate malnutrition    Chronic diastolic CHF (congestive heart failure)    Aortic valve lesion    Severe aortic valve regurgitation    Recent cerebrovascular accident (CVA)        Plan:   Ascending aortic aneurysm with subacute/chronic aortic root dissection: s/p repair and proximal aortic replacement on 11/2.   Severe aortic regurgitation: s/p repair on 11/2.   Cardiac tamponade: secondary to pericardial thrombus anteriorly and compressing right ventricle. S/p reexploration with clot removal and treatment of a small amount of bleeding at the suture line on 11/6.  Cardiogenic shock: due to #3. Resolved  Seizures: postoperatively. EEG yesterday normal.   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 On PD at home. HD postoperatively, last session on 11/13. Now switched back to PD.  Hyponatremia: resolved  HTN: blood pressure well controlled. On amlodipine and carvedilol. Losartan added yesterday.  Chronic anemia: stable  SLE  Depression: Access center has seen patient. At this point, she denies any depression. She was started on escitalopram.    Right MCA CVA: confirmed by MRI. Neurology expects full recovery.  Chest pain: Patient  reported an episode of sharp chest pressure, resolved with nitro x 2.    -Troponin drawn for complaints of chest pain this morning. Elevated at 407. She had normal coronaries on cath earlier this year. Troponin elevation is secondary to ESRD. Will cancel repeat troponins.  -BP is elevated but she has been refusing PO meds. Plans to transition meds to Cortrak. Will see how BP responds.  -She remains very withdrawn. Psych has signed off as she denies needing their services.  -Palliative has been consulted.        CAESAR Michaels  11/22/23  08:47 EST  Electronically signed by CAESAR Damon, 11/15/23, 9:03 AM EST.

## 2023-12-04 NOTE — ED NOTES
Pt ambulate to room with steady gait, agree with triage notes.    continue amlodipine with hold parameters

## 2024-07-15 NOTE — ANESTHESIA QCDR
2019 [facility] Clinical Data Registry (for Quality Improvement)     Postoperative nausea/vomiting risk protocol (Adult = 18 yrs and Pediatric 3-17 yrs)- (430 and 463)  General inhalation anesthetic (NOT TIVA) with PONV risk factors: Yes  Provision of anti-emetic therapy with at least 2 different classes of agents: Yes   Patient DID NOT receive anti-emetic therapy and reason is documented in Medical Record:  N/A    Multimodal Pain Management- (AQI59)  Patient undergoing Elective Surgery (i.e. Outpatient, or ASC, or Prescheduled Surgery prior to Hospital Admission): Yes  Use of Multimodal Pain Management, two or more drugs and/or interventions, NOT including systemic opioids: Yes   Exception: Documented allergy to multiple classes of analgesics:  N/A    PACU assessment of acute postoperative pain prior to Anesthesia Care End- Applies to Patients Age = 18- (ABG7)  Initial PACU pain score is which of the following: < 7/10  Patient unable to report pain score: N/A    Post-anesthetic transfer of care checklist/protocol to PACU/ICU- (426 and 427)  Upon conclusion of case, patient transferred to which of the following locations: PACU/Non-ICU  Use of transfer checklist/protocol: Yes  Exclusion: Service Performed in Patient Hospital Room (and thus did not require transfer): N/A    PACU Reintubation- (AQI31)  General anesthesia requiring endotracheal intubation (ETT) along with subsequent extubation in OR or PACU: Yes  Required reintubation in the PACU: No   Extubation was a planned trial documented in the medical record prior to removal of the original airway device:  N/A    Unplanned admission to ICU related to anesthesia service up through end of PACU care- (MD51)  Unplanned admission to ICU (not initially anticipated at anesthesia start time): No          Nothing to eat after midnight.   Are you taking any blood thinners? When was the last day?  Make sure to use Hibiclens prior to surgery.  Remove any jewelry and body piercings.  Do you wear glasses? If so, please bring a case to store them in.  Are you having any Covid symptoms?  Do you have any new rashes, infections, etc. that we should be aware of?  Do you have a ride home the day of surgery? It cannot be a cab or medical transportation.  Verify surgery time and what time to arrive at hospital.  NO ANSWER,  LEFT TO ARRIVE AT 0715

## 2024-12-23 NOTE — THERAPY
"Physical Therapy Evaluation completed.   Bed Mobility: Supervised    Transfers: Supervised    Gait: Supervised  with No Equipment Needed       Plan of Care: Patient with no further skilled PT needs in the acute care setting at this time  Discharge Recommendations: Equipment: No Equipment Needed. Post-acute therapy Currently anticipate no further skilled therapy needs once patient is discharged from the inpatient setting.    Pt admitted for HA workup and presents with B LE weakness formally, but is functionally able to complete x200' of gait and manage x5 stairs at supervision. Pt reported L LE being \"dull\" as compared to R but was able to localize. At this time, pt does not require further acute PT intervention and appears functionally capable of return home.     See \"Rehab Therapy-Acute\" Patient Summary Report for complete documentation.     " Yes

## 2024-12-23 NOTE — ED NOTES
PROGRESS NOTE       Subjective     Fang is a 53 year old here for Office Visit and Sleep Problem (Requesting sleep medication)   The patient is a 53-year-old female who presents to discuss insomnia secondary to knee pain following a recent left knee arthroplasty.    She reports that overall pain has been improving postoperatively; however, she experiences joint stiffness that frequently disrupts her sleep, making it difficult to resume sleep due to pain in the left knee. She currently experiences pain and stiffness in the left knee. She has surgical pins in the knee as part of the postoperative management. The patient sleeps in a recliner at present. Notably, she had preexisting difficulties with sleep onset prior to the knee replacement surgery, which remains an ongoing issue. She has attempted to manage her insomnia with Tylenol PM and is currently taking melatonin 10 mg nightly, but these interventions have not been effective. She seeks to discuss alternative pharmacological options for insomnia, particularly in light of her impending return to work, which will preclude the possibility of daytime naps.    MEDICATIONS  Current: Melatonin 10 mg nightly, Tylenol PM.    Review of Systems  As documented above.    SDOH Never Smoker       Objective   Vitals:    12/23/24 0924 12/23/24 0938   BP: (!) 142/84 118/78   Pulse: 67    Temp: 98.3 °F (36.8 °C)    TempSrc: Tympanic    SpO2: 98%    Weight: (!) 137.9 kg (304 lb 0.2 oz)      Physical Exam  General: Alert.    Respiratory: Normal respiratory effort.   Cardiovascular: Normal rate and regular rhythm. Normal S1, S2. Murmurs are not present.  Lungs: Clear to auscultation. Wheezing, rales or rhonchi not present.  Musculoskeletal: Gait: uses a cane.  Psychiatric: Mood is normal and affect is normal.             ASSESSMENT AND PLAN        1. Insomnia - Chronic history of difficulty initiating sleep. More recent hx of frequent nighttime awakenings d/t left knee  Pt roomed by RN    Pt presents with complaints as noted in triage.   pain/stiffness following recent left TKA.   - Start trial trazodone  mg nightly as needed for sleep  - Advise to discontinue melatonin use  - Advise to discontinue Tylenol PM  - Discussed sleep hygiene measures to improve sleep quality. Patient info provided in AVS.     1. Insomnia, unspecified type  -     traZODone (DESYREL) 50 MG tablet; Take 1-2 tablets by mouth nightly as needed for Sleep.    F/u  Return for f/u 2/2025 as scheduled. .      David Haley MD  12/23/24  [facility], [address]  [address]  [place] [address]  Dept Phone: [phone]

## 2024-12-27 ENCOUNTER — HOSPITAL ENCOUNTER (EMERGENCY)
Facility: MEDICAL CENTER | Age: 54
End: 2024-12-27
Attending: EMERGENCY MEDICINE
Payer: MEDICARE

## 2024-12-27 ENCOUNTER — APPOINTMENT (OUTPATIENT)
Dept: RADIOLOGY | Facility: MEDICAL CENTER | Age: 54
End: 2024-12-27
Attending: EMERGENCY MEDICINE
Payer: MEDICARE

## 2024-12-27 VITALS
BODY MASS INDEX: 25.16 KG/M2 | RESPIRATION RATE: 16 BRPM | WEIGHT: 141.98 LBS | OXYGEN SATURATION: 98 % | DIASTOLIC BLOOD PRESSURE: 56 MMHG | HEART RATE: 88 BPM | TEMPERATURE: 97.3 F | SYSTOLIC BLOOD PRESSURE: 122 MMHG | HEIGHT: 63 IN

## 2024-12-27 DIAGNOSIS — M19.011 OSTEOARTHRITIS OF RIGHT SHOULDER, UNSPECIFIED OSTEOARTHRITIS TYPE: ICD-10-CM

## 2024-12-27 PROCEDURE — 700111 HCHG RX REV CODE 636 W/ 250 OVERRIDE (IP): Mod: JZ | Performed by: EMERGENCY MEDICINE

## 2024-12-27 PROCEDURE — 99283 EMERGENCY DEPT VISIT LOW MDM: CPT

## 2024-12-27 PROCEDURE — 73030 X-RAY EXAM OF SHOULDER: CPT | Mod: RT

## 2024-12-27 PROCEDURE — 96372 THER/PROPH/DIAG INJ SC/IM: CPT

## 2024-12-27 RX ORDER — KETOROLAC TROMETHAMINE 15 MG/ML
15 INJECTION, SOLUTION INTRAMUSCULAR; INTRAVENOUS ONCE
Status: COMPLETED | OUTPATIENT
Start: 2024-12-27 | End: 2024-12-27

## 2024-12-27 RX ADMIN — KETOROLAC TROMETHAMINE 15 MG: 15 INJECTION, SOLUTION INTRAMUSCULAR; INTRAVENOUS at 16:36

## 2024-12-27 ASSESSMENT — PAIN DESCRIPTION - PAIN TYPE
TYPE: ACUTE PAIN
TYPE: ACUTE PAIN

## 2024-12-27 ASSESSMENT — FIBROSIS 4 INDEX: FIB4 SCORE: 1.37

## 2024-12-27 NOTE — ED TRIAGE NOTES
"Chief Complaint   Patient presents with    Shoulder Pain     Pt fell on the 24th and onto her shoulder. Hx of previous shoulder injury prior to fall. Pt states shoulder is swollen. Pt endorses some numbness that was present prior to injury.      (+) bilateral radial pulses.     /81   Pulse 71   Temp 36 °C (96.8 °F) (Temporal)   Resp 20   Ht 1.6 m (5' 3\")   Wt 64.4 kg (141 lb 15.6 oz)   SpO2 94%   BMI 25.15 kg/m²     "

## 2024-12-28 NOTE — ED PROVIDER NOTES
ED Provider Note    CHIEF COMPLAINT  Chief Complaint   Patient presents with    Shoulder Pain     Pt fell on the 24th and onto her shoulder. Hx of previous shoulder injury prior to fall. Pt states shoulder is swollen. Pt endorses some numbness that was present prior to injury.        EXTERNAL RECORDS REVIEWED  Inpatient Notes ER note 1 year ago November 2023 for headache    HPI/ROS  LIMITATION TO HISTORY   Select: : None  OUTSIDE HISTORIAN(S):  None    Enedelia Pang is a 54 y.o. female who presents to the emerged part with acute on chronic right shoulder pain.  Currently working with [facility] [employer] for ongoing orthopedic evaluation and possible surgical planning.  States that she is waiting for metal to be removed from her spine before she gets shoulder MRI for definitive care planning.  However a few days ago she had a mechanical fall landing on her right shoulder causing increased pain.  She continues to wear orthopedic brace but due to persistent discomfort decided to come to the [facility] apartment more acutely.  No numbness tingling.  Denies loss of conscious.  No neck or back pain.    PAST MEDICAL HISTORY   has a past medical history of Allergy, unspecified not elsewhere classified, Anemia (10/05/2017), Anesthesia, Anxiety, Anxiety, generalized, Arthritis, autoimmune, Autoimmune disorder ([facility]), Back pain, Clostridium difficile diarrhea (2014), Depression, Elevated liver enzymes (2017), Fall, H/O Clostridium difficile infection (2014), MRSA infection (2003), Hydrocephalus ([facility]) (2015), Hyponatremia (9/28/2019), Joint pain (09/10/2019), Migraine with aura, with intractable migraine, so stated, without mention of status migrainosus, MRSA (methicillin resistant Staphylococcus aureus) (08/2015), MRSA (methicillin resistant Staphylococcus aureus) (12/2017), MRSA (methicillin resistant Staphylococcus aureus) (2018), Pituitary abnormality ([facility]) (2002), Port or reservoir infection (10/3/2015), Requires supplemental  oxygen, Seizure (HCC) (started 2017), Sepsis (HCC) (8/30/2015), Staph infection, and Stroke ([facility]) (2007).    SURGICAL HISTORY   has a past surgical history that includes tonsillectomy (1985); other neurological surg (1850-2530); irrigation & debridement neuro (N/A, 6/28/2015); lumbar exploration (N/A, 8/31/2015); spinal cord stimulator (12/19/2016);  shunt insertion (5/31/2017); liver biopsy (07/24/2017); cholecystectomy (2001); appendectomy (1982); spinal cord stimulator (05/24/2018); spinal cord stimulator (2003); knee arthroscopy (Right, 1990); full thickness skin graft (Left, 04/2018); other surgical procedure (11/10/2017); other neurological surg (08/2015); endometrial ablation (2001); tubal coagulation laparoscopic bilateral (Bilateral, 2001); ins/rplc spi npg/rcvr pocket (9/13/2019); other (Left, 01/23/2020); thoracoscopy,dx no bx (Right, 4/28/2021); decortication (Right, 4/28/2021); thoracotomy (Right, 5/17/2021); and decortication (5/17/2021).    FAMILY HISTORY  Family History   Problem Relation Age of Onset    Multiple Sclerosis Mother     Diabetes Father     Alcohol abuse Father        SOCIAL HISTORY  Social History     Tobacco Use    Smoking status: Never    Smokeless tobacco: Never   Vaping Use    Vaping status: Never Used   Substance and Sexual Activity    Alcohol use: Not Currently    Drug use: Never    Sexual activity: Not on file       CURRENT MEDICATIONS  Home Medications       Reviewed by Rose Peguero R.N. (Registered Nurse) on 12/27/24 at 1536  Med List Status: Not Addressed     Medication Last Dose Status   AIMOVIG 70 MG/ML Solution Auto-injector  Active   aspirin 81 MG EC tablet  Active   diphenhydrAMINE (BENADRYL) 50 MG/ML Solution  Active   divalproex ER (DEPAKOTE ER) 250 MG TABLET SR 24 HR  Active   DULoxetine (CYMBALTA) 60 MG Cap DR Particles delayed-release capsule  Active   gabapentin (NEURONTIN) 300 MG Cap  Active   Multiple Vitamins-Minerals (WOMENS MULTIVITAMIN) Tab  Active  "  PARoxetine (PAXIL) 10 MG Tab  Active   risperiDONE (RISPERDAL) 2 MG Tab  Active   Ubrogepant (UBRELVY) 100 MG Tab  Active                  Audit from Redirected Encounters    **Home medications have not yet been reviewed for this encounter**         ALLERGIES  Allergies   Allergen Reactions    Sumatriptan Anaphylaxis     Historical=\"Heart stops.\" Reaction  between 1995 to 1997    Prochlorperazine Rash and Swelling     Tongue swelling. Reaction as a teen. (compazine)  Tolerated Phenergan on 02/24/15    Vancomycin Shortness of Breath and Rash     Reaction in 2005.  D/W patient 8/31/15 - tolerated loading dose of vancomycin administered on 8/30/15 with some itching to chest with decreased infusion rate. Red Man Syndrome.  2018-tolerated slow drip with benadryl pre-med-had no problems.       PHYSICAL EXAM  VITAL SIGNS: /81   Pulse 71   Temp 36 °C (96.8 °F) (Temporal)   Resp 20   Ht 1.6 m (5' 3\")   Wt 64.4 kg (141 lb 15.6 oz)   SpO2 94%   BMI 25.15 kg/m²          Pulse ox interpretation: I interpret this pulse ox as normal.  Constitutional: Alert in no apparent distress.  HENT: No signs of trauma, Bilateral external ears normal, Nose normal.   Eyes: Pupils are equal and reactive  Neck: Normal range of motion, No tenderness, Supple  Cardiovascular: Regular rate and rhythm, no murmurs.   Thorax & Lungs: Normal breath sounds, No respiratory distress  Skin: Warm, Dry, No erythema, No rash.     Extremities: orthopedic splint in place.  Nontender elbow forearm wrist or hand.  Distal neurovascular motor intact.  Musculoskeletal: Good range of motion in all major joints. No tenderness to palpation or major deformities noted.   Neurologic: Alert , Normal motor function, Normal sensory function, No focal deficits noted.   Psychiatric: Affect normal, Judgment normal, Mood normal.         RADIOLOGY/PROCEDURES   I have independently interpreted the diagnostic imaging associated with this visit and am waiting the final " reading from the radiologist.   My preliminary interpretation is as follows: No acute fracture or dislocation noted    Radiologist interpretation:  DX-SHOULDER 2+ RIGHT   Final Result      1.  Osteopenia.      2.  Severe osteoarthritic change of the right glenohumeral joint.      3.  Chronic deformity of the right humeral head.      4.  No evidence for acute fracture or dislocation of the right shoulder.          COURSE & MEDICAL DECISION MAKING    ASSESSMENT, COURSE AND PLAN  Care Narrative: 54-year-old female presented emerged part with acute on chronic right shoulder pain after recurrent fall.  Will complete x-ray imaging.    DISPOSITION AND DISCUSSIONS  I have discussed management of the patient with the following physicians and CARMELO's: None    Discussion of management with other QHP or appropriate source(s): None     Escalation of care considered, and ultimately not performed:acute inpatient care management, however at this time, the patient is most appropriate for outpatient management    Barriers to care at this time, including but not limited to:  None .     54-year-old female presenting with acute on chronic shoulder pain.  X-ray imaging as above.  Patient to continue with orthopedic sling as she arrived with.  She is to follow-up with [facility] for reevaluation in the outpatient setting.  Will continue with home medication as previously discussed.  Will return to the ER with any acute changes or new acute needs    FINAL DIAGNOSIS  1. Osteoarthritis of right shoulder, unspecified osteoarthritis type         Electronically signed by: Silvio Dillard M.D., 12/27/2024 4:14 PM

## 2025-01-01 NOTE — ED PROVIDER NOTES
"ED Provider Note    Scribed for Mauro Lopez M.D. by Shanon Rivera. 1/6/2019, 6:01 PM.    Primary care provider: Elliott Power M.D.  Means of arrival: Walk in  History obtained from: Patient  History limited by: None    CHIEF COMPLAINT  Chief Complaint   Patient presents with   • Migraine     x 5 days   • Seizure       HPI  Enedelia Pang is a 48 y.o. female with prior history of stroke who presents to the Emergency Department for evaluation of an acute left-sided headache with associated visual changes described as \"flashes of light\" onset 5 days ago that initially woke her up from her sleep. She states the pain has been consistently an 8/10 in severity, but reports after she had a seizure tonight the pain increased to a 10/10 in severity. Her  witnessed her seizure tonight and states it was tonic-clonic in nature. She had an episode or urinary incontinence at that time, but denies any tongue biting. The patient has a history of seizures and is currently on Depakote and Keppra prescribed by Dr. Ribera to manage her seizure disorder, but states she has not recently missed any doses and has continued to sleep regularly. She is concerned as she has a history of right sided migraines, but this migraine is left-sided and is atypical. The patient now has tremors present, which she states is not typical for her. She additionally states she has had nausea,vomiting, and diarrhea the past few days. The patient reports 3 episodes of emesis today, but denies any hematemesis, dysuria, or hematochezia. The patient also reports intermittent fevers with a t-max of 100 °F measured earlier today. She additionally has an ulcer to the back of her left hand. The patient has prior history of stroke a few years ago and now has mild right sided weakness at baseline. She has not consumed any alcohol recently.    REVIEW OF SYSTEMS  Pertinent positives include headache, visual changes, seizures, urinary incontinence, " nausea, vomiting, diarrhea, fever, and ulcer to back of left hand. Pertinent negatives include tongue biting, hematemesis, dysuria, or hematochezia. All other systems negative.    PAST MEDICAL HISTORY   has a past medical history of Allergy, unspecified not elsewhere classified; Anemia (10/05/2017); Anxiety; autoimmune; Depression; H/O Clostridium difficile infection; MRSA infection; Hydrocephalus; Migraine with aura, with intractable migraine, so stated, without mention of status migrainosus; MRSA (methicillin resistant Staphylococcus aureus); Pituitary abnormality (HCC); Staph infection; and Stroke (HCC).    SURGICAL HISTORY   has a past surgical history that includes cholecystectomy; tonsillectomy; appendectomy; tubal ligation; other; other neurological surg; irrigation & debridement neuro (N/A, 6/28/2015); lumbar exploration (N/A, 8/31/2015); spinal cord stimulator (12/19/2016); and  shunt insertion (5/31/2017).    SOCIAL HISTORY  Social History   Substance Use Topics   • Smoking status: Never Smoker   • Smokeless tobacco: Never Used   • Alcohol use Yes      Comment: Rare      History   Drug Use No       FAMILY HISTORY  Family History   Problem Relation Age of Onset   • No Known Problems Mother    • No Known Problems Father        CURRENT MEDICATIONS  Home Medications     Reviewed by Fior Gould R.N. (Registered Nurse) on 01/06/19 at 1652  Med List Status: <None>   Medication Last Dose Status   aspirin (ASA) 81 MG Chew Tab chewable tablet  Active   Buprenorphine 5 MCG/HR PATCH WEEKLY  Active   Calcium Citrate-Vitamin D (CALCIUM + D PO)  Active   diphenhydrAMINE (BENADRYL) 50 MG/ML Solution  Active   divalproex ER (DEPAKOTE ER) 500 MG TABLET SR 24 HR  Active   duloxetine (CYMBALTA) 60 MG CPEP delayed-release capsule  Active   gabapentin (NEURONTIN) 600 MG tablet  Active   hydroxychloroquine (PLAQUENIL) 200 MG Tab  Active   ketorolac (TORADOL) 60 MG/2ML Solution  Active   levetiracetam (KEPPRA) 1000 MG  "tablet  Active   NON SPECIFIED  Active   omeprazole (PRILOSEC) 20 MG delayed-release capsule  Active   predniSONE (DELTASONE) 5 MG Tab  Active   risperiDONE (RISPERDAL) 1 MG Tab  Active                ALLERGIES  Allergies   Allergen Reactions   • Sumatriptan Unspecified     Historical=\"Heart stops.\" Reaction  between 1995 to 1997   • Prochlorperazine Swelling     Tongue swelling. Reaction as a teen. (compazine)  Tolerated Phenergan on 02/24/15   • Vancomycin Shortness of Breath and Rash     Reaction in 2005.  D/W patient 8/31/15 - tolerated loading dose of vancomycin administered on 8/30/15 with some itching to chest with decreased infusion rate. Red Man Syndrome       PHYSICAL EXAM  VITAL SIGNS: /94   Pulse (!) 125   Temp 36.7 °C (98 °F) (Temporal)   Resp 18   Ht 1.6 m (5' 3\")   Wt 77.1 kg (170 lb)   SpO2 95%   BMI 30.11 kg/m²     Constitutional: Well developed, Well nourished, Mild distress.   HENT: Normocephalic, Atraumatic, Oropharynx moist. No oral trauma, no oral ulcers.   Eyes: Pupils 4 mm and reactive. Conjunctiva normal, No discharge.   Neck: Supple, No stridor.  Cardiovascular: Tachycardic heart rate, Normal rhythm, No murmurs, equal pulses.   Pulmonary: Normal breath sounds, No respiratory distress, No wheezing, No rales, No rhonchi.  Chest: No chest wall tenderness or deformity.   Abdomen:Soft, No tenderness, No masses, no rebound, no guarding.   Musculoskeletal: No major deformities noted, No tenderness.   Skin: Warm, Dry. 1 x 0.75 cm ulcer to the back of left hand with 0.5 cm of surrounding erythema.  Neurologic: Alert & oriented x 3, Normal motor function. Tremor present, but stops when focused on task like finger to nose or . Slight weakness on right side in upper and lower extremities, per patient this is not new this is since her previous stroke.   Psychiatric: Affect normal, Judgment normal, Mood normal.       LABS  Results for orders placed or performed during the hospital " encounter of 01/06/19   CBC WITH DIFFERENTIAL   Result Value Ref Range    WBC 8.6 4.8 - 10.8 K/uL    RBC 4.24 4.20 - 5.40 M/uL    Hemoglobin 13.6 12.0 - 16.0 g/dL    Hematocrit 40.1 37.0 - 47.0 %    MCV 94.6 81.4 - 97.8 fL    MCH 32.1 27.0 - 33.0 pg    MCHC 33.9 33.6 - 35.0 g/dL    RDW 44.6 35.9 - 50.0 fL    Platelet Count 310 164 - 446 K/uL    MPV 9.5 9.0 - 12.9 fL    Neutrophils-Polys 74.60 (H) 44.00 - 72.00 %    Lymphocytes 15.60 (L) 22.00 - 41.00 %    Monocytes 8.60 0.00 - 13.40 %    Eosinophils 0.10 0.00 - 6.90 %    Basophils 0.60 0.00 - 1.80 %    Immature Granulocytes 0.50 0.00 - 0.90 %    Nucleated RBC 0.00 /100 WBC    Neutrophils (Absolute) 6.45 2.00 - 7.15 K/uL    Lymphs (Absolute) 1.35 1.00 - 4.80 K/uL    Monos (Absolute) 0.74 0.00 - 0.85 K/uL    Eos (Absolute) 0.01 0.00 - 0.51 K/uL    Baso (Absolute) 0.05 0.00 - 0.12 K/uL    Immature Granulocytes (abs) 0.04 0.00 - 0.11 K/uL    NRBC (Absolute) 0.00 K/uL   COMP METABOLIC PANEL   Result Value Ref Range    Sodium 138 135 - 145 mmol/L    Potassium 3.8 3.6 - 5.5 mmol/L    Chloride 103 96 - 112 mmol/L    Co2 23 20 - 33 mmol/L    Anion Gap 12.0 (H) 0.0 - 11.9    Glucose 82 65 - 99 mg/dL    Bun 4 (L) 8 - 22 mg/dL    Creatinine 0.65 0.50 - 1.40 mg/dL    Calcium 9.1 8.5 - 10.5 mg/dL    AST(SGOT) 271 (H) 12 - 45 U/L    ALT(SGPT) 333 (H) 2 - 50 U/L    Alkaline Phosphatase 161 (H) 30 - 99 U/L    Total Bilirubin 0.5 0.1 - 1.5 mg/dL    Albumin 4.0 3.2 - 4.9 g/dL    Total Protein 6.6 6.0 - 8.2 g/dL    Globulin 2.6 1.9 - 3.5 g/dL    A-G Ratio 1.5 g/dL   URINALYSIS CULTURE, IF INDICATED   Result Value Ref Range    Color Yellow     Character Cloudy (A)     Specific Gravity 1.007 <1.035    Ph 6.5 5.0 - 8.0    Glucose Negative Negative mg/dL    Ketones Trace (A) Negative mg/dL    Protein Negative Negative mg/dL    Bilirubin Negative Negative    Urobilinogen, Urine 0.2 Negative    Nitrite Negative Negative    Leukocyte Esterase Negative Negative    Occult Blood Negative  Negative    Micro Urine Req Microscopic    APTT   Result Value Ref Range    APTT 33.2 24.7 - 36.0 sec   PROTHROMBIN TIME   Result Value Ref Range    PT 13.9 12.0 - 14.6 sec    INR 1.06 0.87 - 1.13   VALPROIC ACID (Depakote)   Result Value Ref Range    Valproic Acid 21.7 (L) 50.0 - 100.0 ug/mL   URINE MICROSCOPIC (W/UA)   Result Value Ref Range    WBC 5-10 (A) /hpf    RBC 0-2 /hpf    Bacteria Moderate (A) None /hpf    Epithelial Cells Few /hpf    Hyaline Cast 6-10 (A) /lpf   ESTIMATED GFR   Result Value Ref Range    GFR If African American >60 >60 mL/min/1.73 m 2    GFR If Non African American >60 >60 mL/min/1.73 m 2     All labs reviewed by me.    RADIOLOGY  CT-HEAD W/O   Final Result      No acute intracranial abnormality is identified.        The radiologist's interpretation of all radiological studies have been reviewed by me.    COURSE & MEDICAL DECISION MAKING  Pertinent Labs & Imaging studies reviewed. (See chart for details)    6:04 PM - Obtained and reviewed past medical records that indicate patient was seen in October of 2018 by Dr. Ribera, Neurology, for a headache. He placed her back on Depakote and kept her on her Keppra. She additionally had an EEG at that time that was unremarkable, but not normal.     6:24 PM - Patient seen and examined at bedside. Patient will be treated with Morphine 4 mg, Zofran 4 mg, Reglan 10 mg, Benadryl 25 mg, and Ativan 0.5 mg. The patient will receive IV fluids for tachycardia and migraine cocktail. Ordered CT-Head, Valproic Acid, CBC with differential, CMP, Urinalysis, APTT, and PTT to evaluate her symptoms. The differential diagnoses include but are not limited to: seizure disorder, electrolyte abnormality, dehydration, intracranial hemorrhage.    8:42 PM - Patient reevaluated at bedside. She is resting comfortalby in bed, but remains tachycardic. She states she is slightly tachycardic at baseline and is not concerned about the tachycardia. The patient was updated with  resulted lab and radiology results thus far that are reassuring, but we will continue to await Valproic Acid results. She will be reevaluated shortly.    9:24 PM - Paged Neurology    9:33 PM - Consult with Dr. Jenkins, Neurology, who recommends increasing patient's Keppra to 1500 mg BID. He wishes for Depakote to remains unchanged due to elevated liver enzymes.      9:37 PM - Patient informed after speaking with Neurologist, her dose of Keppra will be increased to 1500 mg. She has taken her night time dose of Keppra, therefore she will be given additional 500 mg at this time and sent home with prescription. The patient reports her headache is beginning to feel better. Tachycardia has improved after IV fluids, thus having positive reaction. She will be discharged home. The patient is instructed to return to the ED for a seizure lasting longer than 5 minutes, prolonged confusion, or any other concerning symptoms. She is understanding and agreeable to discharge.      Medical Decision Making: At this point time I think patient most likely a migraine headache.  Because is atypical for classic migraine and patient had a seizure is concerned about possible intracranial hemorrhage or other cause for her seizures therefore CT of the head was done and this is negative for hemorrhage.  Patient's electrolytes are unremarkable.  At this point time will discharge her home.  After discussion with neurology patient will increase her Keppra.  I did discuss that her liver enzymes were elevated again and she will need to talk about this with her neurologist.  Her wound on her left hand looks to be mildly infected I will place her on doxycycline because of a history of MRSA.    The patient will return for new or worsening symptoms and is stable at the time of discharge.    DISPOSITION:  Patient will be discharged home in stable condition.    FOLLOW UP:  Drew Ribera M.D.  [address]  [address]  [phone]    Schedule an appointment as soon as possible for a visit         OUTPATIENT MEDICATIONS:  Discharge Medication List as of 1/6/2019 10:24 PM      START taking these medications    Details   doxycycline (MONODOX) 100 MG capsule Take 1 Cap by mouth 2 times a day for 10 days., Disp-20 Cap, R-0, Print Rx Paper      !! levETIRAcetam (KEPPRA) 500 MG Tab Take 1 Tab by mouth 2 times a day., Disp-60 Tab, R-0, Print Rx Paper       !! - Potential duplicate medications found. Please discuss with provider.           FINAL IMPRESSION  1. Migraine with aura and without status migrainosus, not intractable    2. Seizure disorder (HCC)    3. Cellulitis of left hand          Shanon MADERA (Victorinoibadolfo), am scribing for, and in the presence of, Mauro Lopez M.D.    Electronically signed by: Shanon Rivera (Rob), 1/6/2019    IMauro M.D. personally performed the services described in this documentation, as scribed by Shanon Rivera in my presence, and it is both accurate and complete.    C.  As far as the patient  The note accurately reflects work and decisions made by me.  Mauro Lopez  1/7/2019  2:21 AM     There are no Wet Read(s) to document.

## 2025-01-19 ENCOUNTER — APPOINTMENT (OUTPATIENT)
Dept: RADIOLOGY | Facility: MEDICAL CENTER | Age: 55
End: 2025-01-19
Attending: EMERGENCY MEDICINE
Payer: MEDICARE

## 2025-01-19 ENCOUNTER — HOSPITAL ENCOUNTER (EMERGENCY)
Facility: MEDICAL CENTER | Age: 55
End: 2025-01-19
Attending: EMERGENCY MEDICINE
Payer: MEDICARE

## 2025-01-19 VITALS
WEIGHT: 136.47 LBS | BODY MASS INDEX: 24.17 KG/M2 | RESPIRATION RATE: 18 BRPM | OXYGEN SATURATION: 100 % | TEMPERATURE: 98.6 F | SYSTOLIC BLOOD PRESSURE: 119 MMHG | DIASTOLIC BLOOD PRESSURE: 68 MMHG | HEART RATE: 100 BPM

## 2025-01-19 DIAGNOSIS — S42.031A CLOSED DISPLACED FRACTURE OF ACROMIAL END OF RIGHT CLAVICLE, INITIAL ENCOUNTER: ICD-10-CM
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
## 2025-01-19 PROCEDURE — 73030 X-RAY EXAM OF SHOULDER: CPT | Mod: RT

## 2025-01-19 PROCEDURE — 700102 HCHG RX REV CODE 250 W/ 637 OVERRIDE(OP): Performed by: EMERGENCY MEDICINE

## 2025-01-19 PROCEDURE — A9270 NON-COVERED ITEM OR SERVICE: HCPCS | Performed by: EMERGENCY MEDICINE

## 2025-01-19 PROCEDURE — 99284 EMERGENCY DEPT VISIT MOD MDM: CPT

## 2025-01-19 RX ORDER — HYDROCODONE BITARTRATE AND ACETAMINOPHEN 5; 325 MG/1; MG/1
1 TABLET ORAL ONCE
Status: COMPLETED | OUTPATIENT
Start: 2025-01-19 | End: 2025-01-19

## 2025-01-19 RX ORDER — HYDROCODONE BITARTRATE AND ACETAMINOPHEN 5; 325 MG/1; MG/1
1 TABLET ORAL EVERY 4 HOURS PRN
Qty: 10 TABLET | Refills: 0 | Status: SHIPPED | OUTPATIENT
Start: 2025-01-19 | End: 2025-01-21

## 2025-01-19 RX ADMIN — HYDROCODONE BITARTRATE AND ACETAMINOPHEN 1 TABLET: 5; 325 TABLET ORAL at 18:11

## 2025-01-19 ASSESSMENT — PAIN DESCRIPTION - PAIN TYPE
TYPE: ACUTE PAIN
TYPE: ACUTE PAIN

## 2025-01-19 ASSESSMENT — FIBROSIS 4 INDEX: FIB4 SCORE: 1.37

## 2025-01-20 NOTE — ED PROVIDER NOTES
ED Provider Note    CHIEF COMPLAINT  Chief Complaint   Patient presents with    T-5000 GLF     Dog pulled pt and landed on right shoulder.         EXTERNAL RECORDS REVIEWED  Hospitalist discharge summary reviewed from 2021 for empyema.  Patient had sepsis due to a port line infection.  She has a history of pseudotumor cerebri with  shunt and chronic pain with a nerve stimulator vasculitis and a right anterior chest port.  The course was complicated by septic emboli.  Chest tube was placed.    HPI  Enedelia Pang is a 54 y.o. female who presents to the Emergency Department with right shoulder pain after she fell onto her right shoulder on grass walking her dog.  The dog pulled her off balance.  This was a direct blow injury.  Her pain is quite severe.  She is right-hand dominant.  She has known severe osteoarthritis of the right shoulder.  She believes it dislocated and that she reduced it.  She is had subluxations or dislocations before.  She denies other injury to head neck back chest abdomen and extremities.      LIMITATION TO HISTORY   None     OUTSIDE HISTORIAN(S):  None    REVIEW OF SYSTEMS  Pertinent positives include: Right shoulder pain after fall.  Tingling  In the fingers  Pertinent negatives include: Weakness in the hand.    PAST MEDICAL HISTORY  Past Medical History:   Diagnosis Date    Allergy, unspecified not elsewhere classified     Anemia 10/05/2017    Unsure if a current issue.    Anesthesia     Migrane after anesthesia    Anxiety     Anxiety, generalized     Arthritis     autoimmune     Autoimmune disorder (HCC)     Unknown etiology or type    Back pain     Clostridium difficile diarrhea 2014    follow up tests negative    Depression     Elevated liver enzymes 2017    Related to meds.    Fall     H/O Clostridium difficile infection 2014    Hx MRSA infection 2003    with neurostimulator implant    Hydrocephalus (HCC) 2015    with lumbar shunt    Hyponatremia 9/28/2019    Joint pain 09/10/2019     "Especially right shoulder    Migraine with aura, with intractable migraine, so stated, without mention of status migrainosus     from undefined autoimmune issue    MRSA (methicillin resistant Staphylococcus aureus) 08/2015    to lumbar shunt and power port    MRSA (methicillin resistant Staphylococcus aureus) 12/2017    left foot autoimmune decay    MRSA (methicillin resistant Staphylococcus aureus) 2018    to neurostimulator.     Pituitary abnormality (HCC) 2002    Port or reservoir infection 10/3/2015    Requires supplemental oxygen     to treat migraines. 2-5L/NC    Seizure ([facility]) started 2017    Unknown etiology-not sure if related to autoimmune issue. Last seizure 2/2019    Sepsis ([facility]) 8/30/2015    Staph infection     Stroke ([facility]) 2007     with right side residual weakness       SOCIAL HISTORY  Social History     Tobacco Use    Smoking status: Never    Smokeless tobacco: Never   Vaping Use    Vaping status: Never Used   Substance Use Topics    Alcohol use: Not Currently    Drug use: Never     Social History     Substance and Sexual Activity   Drug Use Never       CURRENT MEDICATIONS  No current facility-administered medications for this encounter.    Current Outpatient Medications:     gabapentin (NEURONTIN) 300 MG Cap, Take 1 capsule by mouth every 8 hours., Disp: 90 capsule, Rfl: 0    Multiple Vitamins-Minerals (WOMENS MULTIVITAMIN) Tab, Take 1 tablet by mouth 2 times a day. \"Equate Women's Multivitamin/Multimineral\", Disp: , Rfl:     Ubrogepant (UBRELVY) 100 MG Tab, Take 100 mg by mouth 2 times a day as needed (Migraine). May take a 2nd dose after 4 hours if migraine persists. Max of 2 tablets in 24 hours., Disp: , Rfl:     divalproex ER (DEPAKOTE ER) 250 MG TABLET SR 24 HR, Take 500 mg by mouth 3 times a day. 2 tablets = 500 mg., Disp: , Rfl:     DULoxetine (CYMBALTA) 60 MG Cap DR Particles delayed-release capsule, Take 60 mg by mouth 2 times a day., Disp: , Rfl:     AIMOVIG 70 MG/ML Solution Auto-injector, " "Inject 70 mg under the skin Q30 DAYS., Disp: , Rfl:     PARoxetine (PAXIL) 10 MG Tab, Take 10 mg by mouth every morning., Disp: , Rfl:     diphenhydrAMINE (BENADRYL) 50 MG/ML Solution, Inject 75 mg into the shoulder, thigh, or buttocks 3 times a day as needed (Migraine)., Disp: , Rfl:     risperiDONE (RISPERDAL) 2 MG Tab, Take 2 mg by mouth 2 times a day., Disp: , Rfl:     aspirin 81 MG EC tablet, Take 81 mg by mouth every morning., Disp: , Rfl:     ALLERGIES  Allergies   Allergen Reactions    Sumatriptan Anaphylaxis     Historical=\"Heart stops.\" Reaction  between 1995 to 1997    Prochlorperazine Rash and Swelling     Tongue swelling. Reaction as a teen. (compazine)  Tolerated Phenergan on 02/24/15    Vancomycin Shortness of Breath and Rash     Reaction in 2005.  D/W patient 8/31/15 - tolerated loading dose of vancomycin administered on 8/30/15 with some itching to chest with decreased infusion rate. Red Man Syndrome.  2018-tolerated slow drip with benadryl pre-med-had no problems.       PHYSICAL EXAM  VITAL SIGNS: /82   Pulse 95   Temp 37 °C (98.6 °F) (Temporal)   Resp 18   Wt 61.9 kg (136 lb 7.4 oz)   SpO2 95%   BMI 24.17 kg/m²  Reviewed and normal  Constitutional: Well developed, Well nourished, well-appearing.  HENT: Atraumatic.   Cardiovascular:  Peripheral pulses radial 1+.  Skin: Intact.   Musculoskeletal: Tenderness over the distal clavicle, No vacancy under the acromion, mild glenohumeral tenderness  Compartments: Soft arm compartment  Neurologic: Finger flexion extension abduction and thumb opposition intact.  Sensation intact on the pads of the first and fifth fingers and over the first dorsal webspace but dysthetic in the hand.  Sensation intact over the deltoid      RADIOLOGY  I have independently interpreted the right shoulder x-ray associated with this visit demonstrating displaced distal clavicle fracture.  I am awaiting the final reading from the radiologist which will be documented " below.     Final Radiology Report  DX-SHOULDER 2+ RIGHT   Final Result      There is no new abnormality. Distal right clavicular fracture demonstrates no significant bridging callus.        Radiologist interpretation have been reviewed by me.       ED COURSE:    INTERVENTIONS BY ME:  Medications   HYDROcodone-acetaminophen (Norco) 5-325 MG per tablet 1 Tablet (1 Tablet Oral Given 1/19/25 1811)     Patient was given a sling    ASSESSMENT, COURSE AND PLAN:  PROBLEMS EVALUATED THIS VISIT:    This patient presents with a shoulder injury after a fall walking her dog.  She landed directly on the shoulder and has a distal displaced clavicle fracture on x-ray.  There is no dislocation at this time and there is no other fracture.  She is neurovascularly intact.  There is no evidence of other head spine torso or extremity injury.    Measures: Prescription monitoring queried and score 130  Opiate risk tool utilized and patient low risk  Informed consent obtained for opiate analgesic  Indication opiate analgesic fracture pain      DISPOSITION AND DISCUSSIONS    RISK:  Moderate given need for prescription opiate analgesic    MY PLAN:  Discharge Medication List as of 1/19/2025  6:14 PM        START taking these medications    Details   HYDROcodone-acetaminophen (NORCO) 5-325 MG Tab per tablet Take 1 Tablet by mouth every four hours as needed (mild pain) for up to 2 days., Disp-10 Tablet, R-0, Normal           Sling   Ibuprofen and acetaminophen for mild pain, ice    Clavicle fracture handout given    Followup:  [facility]  [address] [place] [address] [id number]  [facility] [id number] [phone]  Schedule an appointment as soon as possible for a visit     Patient has seen [facility] orthopedics and is established there    CONDITION: Stable.     FINAL IMPRESSION  1. Closed displaced fracture of acromial end of right clavicle, initial encounter    2. Fall, initial encounter         Benny Rowe M.D., 01/19/25  6:36 PM

## 2025-01-20 NOTE — ED TRIAGE NOTES
Chief Complaint   Patient presents with    T-5000 GLF     Dog pulled pt and landed on right shoulder.      CMS +.  /82   Pulse 95   Temp 37 °C (98.6 °F) (Temporal)   Resp 18   Wt 61.9 kg (136 lb 7.4 oz)   SpO2 95%   Pt informed of wait times. Educated on triage process.  Asked to return to triage RN for any new or worsening of symptoms. Thanked for patience.

## 2025-01-20 NOTE — DISCHARGE INSTRUCTIONS
Take ibuprofen and acetaminophen for mild pain and ibuprofen and Norco instead of acetaminophen for more severe pain.  Ice the shoulder for 15 minutes at a time 6 times a day for 2 days with a barrier cloth between the ice and the skin.  Follow-up with [facility]  within the week.

## 2025-02-18 ENCOUNTER — APPOINTMENT (OUTPATIENT)
Dept: RADIOLOGY | Facility: MEDICAL CENTER | Age: 55
DRG: 641 | End: 2025-02-18
Attending: STUDENT IN AN ORGANIZED HEALTH CARE EDUCATION/TRAINING PROGRAM
Payer: MEDICARE

## 2025-02-18 ENCOUNTER — HOSPITAL ENCOUNTER (INPATIENT)
Facility: MEDICAL CENTER | Age: 55
LOS: 2 days | DRG: 641 | End: 2025-02-20
Attending: EMERGENCY MEDICINE | Admitting: STUDENT IN AN ORGANIZED HEALTH CARE EDUCATION/TRAINING PROGRAM
Payer: MEDICARE

## 2025-02-18 ENCOUNTER — APPOINTMENT (OUTPATIENT)
Dept: RADIOLOGY | Facility: MEDICAL CENTER | Age: 55
DRG: 641 | End: 2025-02-18
Attending: HOSPITALIST
Payer: MEDICARE

## 2025-02-18 ENCOUNTER — APPOINTMENT (OUTPATIENT)
Dept: RADIOLOGY | Facility: MEDICAL CENTER | Age: 55
DRG: 641 | End: 2025-02-18
Attending: EMERGENCY MEDICINE
Payer: MEDICARE
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
## 2025-02-18 DIAGNOSIS — R60.1 ANASARCA: ICD-10-CM

## 2025-02-18 DIAGNOSIS — I33.0 ACUTE BACTERIAL ENDOCARDITIS: ICD-10-CM

## 2025-02-18 DIAGNOSIS — R27.0 ATAXIA: ICD-10-CM

## 2025-02-18 DIAGNOSIS — G93.40 ACUTE ENCEPHALOPATHY: ICD-10-CM

## 2025-02-18 DIAGNOSIS — D62 ACUTE BLOOD LOSS ANEMIA: ICD-10-CM

## 2025-02-18 DIAGNOSIS — E87.1 ACUTE HYPONATREMIA: ICD-10-CM

## 2025-02-18 DIAGNOSIS — M35.9 AUTOIMMUNE CEREBRITIS (HCC): ICD-10-CM

## 2025-02-18 DIAGNOSIS — J96.01 ACUTE RESPIRATORY FAILURE WITH HYPOXIA (HCC): ICD-10-CM

## 2025-02-18 DIAGNOSIS — E87.1 HYPONATREMIA: ICD-10-CM

## 2025-02-18 DIAGNOSIS — G05.3 AUTOIMMUNE CEREBRITIS (HCC): ICD-10-CM

## 2025-02-18 DIAGNOSIS — R79.89 ABNORMAL LFTS: ICD-10-CM

## 2025-02-18 PROBLEM — E83.39 HYPOPHOSPHATEMIA: Status: ACTIVE | Noted: 2025-02-18

## 2025-02-18 LAB
ALBUMIN SERPL BCP-MCNC: 4.7 G/DL (ref 3.2–4.9)
ALP SERPL-CCNC: 104 U/L (ref 30–99)
ALT SERPL-CCNC: 70 U/L (ref 2–50)
AMPHET UR QL SCN: NEGATIVE
ANION GAP SERPL CALC-SCNC: 11 MMOL/L (ref 7–16)
APPEARANCE UR: CLEAR
AST SERPL-CCNC: 28 U/L (ref 12–45)
BACTERIA #/AREA URNS HPF: ABNORMAL /HPF
BARBITURATES UR QL SCN: NEGATIVE
BASOPHILS # BLD AUTO: 0.4 % (ref 0–1.8)
BASOPHILS # BLD: 0.03 K/UL (ref 0–0.12)
BENZODIAZ UR QL SCN: POSITIVE
BILIRUB CONJ SERPL-MCNC: <0.2 MG/DL (ref 0.1–0.5)
BILIRUB INDIRECT SERPL-MCNC: ABNORMAL MG/DL (ref 0–1)
BILIRUB SERPL-MCNC: 0.3 MG/DL (ref 0.1–1.5)
BILIRUB UR QL STRIP.AUTO: NEGATIVE
BUN SERPL-MCNC: 8 MG/DL (ref 8–22)
BZE UR QL SCN: NEGATIVE
CALCIUM SERPL-MCNC: 8.6 MG/DL (ref 8.5–10.5)
CANNABINOIDS UR QL SCN: NEGATIVE
CASTS URNS QL MICRO: ABNORMAL /LPF (ref 0–2)
CHLORIDE SERPL-SCNC: 83 MMOL/L (ref 96–112)
CK SERPL-CCNC: 73 U/L (ref 0–154)
CO2 SERPL-SCNC: 20 MMOL/L (ref 20–33)
COLOR UR: YELLOW
CORTIS SERPL-MCNC: 15.7 UG/DL (ref 0–23)
CREAT SERPL-MCNC: 0.57 MG/DL (ref 0.5–1.4)
CREAT UR-MCNC: 21.2 MG/DL
CRP SERPL HS-MCNC: <0.3 MG/DL (ref 0–0.75)
EKG IMPRESSION: NORMAL
EKG IMPRESSION: NORMAL
EOSINOPHIL # BLD AUTO: 0.01 K/UL (ref 0–0.51)
EOSINOPHIL NFR BLD: 0.1 % (ref 0–6.9)
EPITHELIAL CELLS 1715: ABNORMAL /HPF (ref 0–5)
ERYTHROCYTE [DISTWIDTH] IN BLOOD BY AUTOMATED COUNT: 45.1 FL (ref 35.9–50)
ERYTHROCYTE [SEDIMENTATION RATE] IN BLOOD BY WESTERGREN METHOD: 5 MM/HOUR (ref 0–25)
FENTANYL UR QL: POSITIVE
GFR SERPLBLD CREATININE-BSD FMLA CKD-EPI: 109 ML/MIN/1.73 M 2
GLUCOSE SERPL-MCNC: 90 MG/DL (ref 65–99)
GLUCOSE UR STRIP.AUTO-MCNC: NEGATIVE MG/DL
HCT VFR BLD AUTO: 37.6 % (ref 37–47)
HGB BLD-MCNC: 13.7 G/DL (ref 12–16)
IMM GRANULOCYTES # BLD AUTO: 0.04 K/UL (ref 0–0.11)
IMM GRANULOCYTES NFR BLD AUTO: 0.5 % (ref 0–0.9)
INR PPP: 0.92 (ref 0.87–1.13)
KETONES UR STRIP.AUTO-MCNC: NEGATIVE MG/DL
LACTATE SERPL-SCNC: 0.8 MMOL/L (ref 0.5–2)
LDH SERPL L TO P-CCNC: 218 U/L (ref 107–266)
LEUKOCYTE ESTERASE UR QL STRIP.AUTO: ABNORMAL
LYMPHOCYTES # BLD AUTO: 1.03 K/UL (ref 1–4.8)
LYMPHOCYTES NFR BLD: 13.5 % (ref 22–41)
MAGNESIUM SERPL-MCNC: 1.8 MG/DL (ref 1.5–2.5)
MCH RBC QN AUTO: 31.6 PG (ref 27–33)
MCHC RBC AUTO-ENTMCNC: 36.4 G/DL (ref 32.2–35.5)
MCV RBC AUTO: 86.8 FL (ref 81.4–97.8)
METHADONE UR QL SCN: NEGATIVE
MICRO URNS: ABNORMAL
MONOCYTES # BLD AUTO: 0.33 K/UL (ref 0–0.85)
MONOCYTES NFR BLD AUTO: 4.3 % (ref 0–13.4)
NEUTROPHILS # BLD AUTO: 6.2 K/UL (ref 1.82–7.42)
NEUTROPHILS NFR BLD: 81.2 % (ref 44–72)
NITRITE UR QL STRIP.AUTO: NEGATIVE
NRBC # BLD AUTO: 0 K/UL
NRBC BLD-RTO: 0 /100 WBC (ref 0–0.2)
OPIATES UR QL SCN: NEGATIVE
OXYCODONE UR QL SCN: NEGATIVE
PCP UR QL SCN: NEGATIVE
PH UR STRIP.AUTO: 7 [PH] (ref 5–8)
PHOSPHATE SERPL-MCNC: 2.3 MG/DL (ref 2.5–4.5)
PLATELET # BLD AUTO: 281 K/UL (ref 164–446)
PMV BLD AUTO: 8.6 FL (ref 9–12.9)
POTASSIUM SERPL-SCNC: 3.5 MMOL/L (ref 3.6–5.5)
PROCALCITONIN SERPL-MCNC: <0.05 NG/ML
PROLACTIN SERPL-MCNC: 74.3 NG/ML (ref 2.8–26)
PROPOXYPH UR QL SCN: NEGATIVE
PROT SERPL-MCNC: 7.6 G/DL (ref 6–8.2)
PROT UR QL STRIP: NEGATIVE MG/DL
PROTHROMBIN TIME: 12.4 SEC (ref 12–14.6)
RBC # BLD AUTO: 4.33 M/UL (ref 4.2–5.4)
RBC # URNS HPF: ABNORMAL /HPF (ref 0–2)
RBC UR QL AUTO: NEGATIVE
SODIUM SERPL-SCNC: 114 MMOL/L (ref 135–145)
SODIUM SERPL-SCNC: 117 MMOL/L (ref 135–145)
SODIUM SERPL-SCNC: 117 MMOL/L (ref 135–145)
SODIUM SERPL-SCNC: 121 MMOL/L (ref 135–145)
SODIUM SERPL-SCNC: 122 MMOL/L (ref 135–145)
SODIUM SERPL-SCNC: 124 MMOL/L (ref 135–145)
SODIUM UR-SCNC: <20 MMOL/L
SP GR UR STRIP.AUTO: 1.01
UROBILINOGEN UR STRIP.AUTO-MCNC: 0.2 EU/DL
WBC # BLD AUTO: 7.6 K/UL (ref 4.8–10.8)
WBC #/AREA URNS HPF: ABNORMAL /HPF

## 2025-02-18 PROCEDURE — 85025 COMPLETE CBC W/AUTO DIFF WBC: CPT

## 2025-02-18 PROCEDURE — 84100 ASSAY OF PHOSPHORUS: CPT

## 2025-02-18 PROCEDURE — 92610 EVALUATE SWALLOWING FUNCTION: CPT

## 2025-02-18 PROCEDURE — 84146 ASSAY OF PROLACTIN: CPT

## 2025-02-18 PROCEDURE — 82533 TOTAL CORTISOL: CPT

## 2025-02-18 PROCEDURE — 770000 HCHG ROOM/CARE - INTERMEDIATE ICU *

## 2025-02-18 PROCEDURE — 85652 RBC SED RATE AUTOMATED: CPT

## 2025-02-18 PROCEDURE — 96375 TX/PRO/DX INJ NEW DRUG ADDON: CPT

## 2025-02-18 PROCEDURE — 99291 CRITICAL CARE FIRST HOUR: CPT | Performed by: STUDENT IN AN ORGANIZED HEALTH CARE EDUCATION/TRAINING PROGRAM

## 2025-02-18 PROCEDURE — 83605 ASSAY OF LACTIC ACID: CPT

## 2025-02-18 PROCEDURE — 80307 DRUG TEST PRSMV CHEM ANLYZR: CPT

## 2025-02-18 PROCEDURE — 700105 HCHG RX REV CODE 258: Performed by: STUDENT IN AN ORGANIZED HEALTH CARE EDUCATION/TRAINING PROGRAM

## 2025-02-18 PROCEDURE — 96365 THER/PROPH/DIAG IV INF INIT: CPT

## 2025-02-18 PROCEDURE — 85610 PROTHROMBIN TIME: CPT

## 2025-02-18 PROCEDURE — 82570 ASSAY OF URINE CREATININE: CPT

## 2025-02-18 PROCEDURE — 84295 ASSAY OF SERUM SODIUM: CPT | Mod: 91

## 2025-02-18 PROCEDURE — 700111 HCHG RX REV CODE 636 W/ 250 OVERRIDE (IP): Mod: JZ | Performed by: HOSPITALIST

## 2025-02-18 PROCEDURE — 700111 HCHG RX REV CODE 636 W/ 250 OVERRIDE (IP): Performed by: STUDENT IN AN ORGANIZED HEALTH CARE EDUCATION/TRAINING PROGRAM

## 2025-02-18 PROCEDURE — C1751 CATH, INF, PER/CENT/MIDLINE: HCPCS

## 2025-02-18 PROCEDURE — 700111 HCHG RX REV CODE 636 W/ 250 OVERRIDE (IP): Mod: JZ | Performed by: EMERGENCY MEDICINE

## 2025-02-18 PROCEDURE — 81001 URINALYSIS AUTO W/SCOPE: CPT

## 2025-02-18 PROCEDURE — 700102 HCHG RX REV CODE 250 W/ 637 OVERRIDE(OP): Performed by: EMERGENCY MEDICINE

## 2025-02-18 PROCEDURE — 82550 ASSAY OF CK (CPK): CPT

## 2025-02-18 PROCEDURE — A9270 NON-COVERED ITEM OR SERVICE: HCPCS | Performed by: STUDENT IN AN ORGANIZED HEALTH CARE EDUCATION/TRAINING PROGRAM

## 2025-02-18 PROCEDURE — 93005 ELECTROCARDIOGRAM TRACING: CPT | Mod: TC | Performed by: EMERGENCY MEDICINE

## 2025-02-18 PROCEDURE — 80076 HEPATIC FUNCTION PANEL: CPT

## 2025-02-18 PROCEDURE — 83615 LACTATE (LD) (LDH) ENZYME: CPT

## 2025-02-18 PROCEDURE — 36415 COLL VENOUS BLD VENIPUNCTURE: CPT

## 2025-02-18 PROCEDURE — 80048 BASIC METABOLIC PNL TOTAL CA: CPT

## 2025-02-18 PROCEDURE — 84300 ASSAY OF URINE SODIUM: CPT

## 2025-02-18 PROCEDURE — 700102 HCHG RX REV CODE 250 W/ 637 OVERRIDE(OP): Performed by: STUDENT IN AN ORGANIZED HEALTH CARE EDUCATION/TRAINING PROGRAM

## 2025-02-18 PROCEDURE — 83735 ASSAY OF MAGNESIUM: CPT

## 2025-02-18 PROCEDURE — 86140 C-REACTIVE PROTEIN: CPT

## 2025-02-18 PROCEDURE — A9270 NON-COVERED ITEM OR SERVICE: HCPCS | Performed by: EMERGENCY MEDICINE

## 2025-02-18 PROCEDURE — 700101 HCHG RX REV CODE 250: Performed by: STUDENT IN AN ORGANIZED HEALTH CARE EDUCATION/TRAINING PROGRAM

## 2025-02-18 PROCEDURE — 71045 X-RAY EXAM CHEST 1 VIEW: CPT

## 2025-02-18 PROCEDURE — 70450 CT HEAD/BRAIN W/O DYE: CPT

## 2025-02-18 PROCEDURE — 99285 EMERGENCY DEPT VISIT HI MDM: CPT

## 2025-02-18 PROCEDURE — A9270 NON-COVERED ITEM OR SERVICE: HCPCS | Performed by: HOSPITALIST

## 2025-02-18 PROCEDURE — 84145 PROCALCITONIN (PCT): CPT

## 2025-02-18 PROCEDURE — 76705 ECHO EXAM OF ABDOMEN: CPT

## 2025-02-18 PROCEDURE — 700102 HCHG RX REV CODE 250 W/ 637 OVERRIDE(OP): Performed by: HOSPITALIST

## 2025-02-18 RX ORDER — ASPIRIN 81 MG/1
81 TABLET ORAL EVERY MORNING
Status: DISCONTINUED | OUTPATIENT
Start: 2025-02-18 | End: 2025-02-20 | Stop reason: HOSPADM

## 2025-02-18 RX ORDER — TRAZODONE HYDROCHLORIDE 100 MG/1
100 TABLET ORAL
COMMUNITY
Start: 2024-12-13

## 2025-02-18 RX ORDER — OXYCODONE HYDROCHLORIDE 5 MG/1
5 TABLET ORAL EVERY 4 HOURS PRN
Refills: 0 | Status: DISCONTINUED | OUTPATIENT
Start: 2025-02-18 | End: 2025-02-20 | Stop reason: HOSPADM

## 2025-02-18 RX ORDER — DIPHENHYDRAMINE HCL 25 MG
25 TABLET ORAL ONCE
Status: COMPLETED | OUTPATIENT
Start: 2025-02-18 | End: 2025-02-18

## 2025-02-18 RX ORDER — POLYETHYLENE GLYCOL 3350 17 G/17G
1 POWDER, FOR SOLUTION ORAL
Status: DISCONTINUED | OUTPATIENT
Start: 2025-02-18 | End: 2025-02-20 | Stop reason: HOSPADM

## 2025-02-18 RX ORDER — ACETAMINOPHEN 325 MG/1
650 TABLET ORAL EVERY 6 HOURS PRN
Status: DISCONTINUED | OUTPATIENT
Start: 2025-02-18 | End: 2025-02-20 | Stop reason: HOSPADM

## 2025-02-18 RX ORDER — LABETALOL HYDROCHLORIDE 5 MG/ML
10 INJECTION, SOLUTION INTRAVENOUS EVERY 4 HOURS PRN
Status: DISCONTINUED | OUTPATIENT
Start: 2025-02-18 | End: 2025-02-20 | Stop reason: HOSPADM

## 2025-02-18 RX ORDER — ONDANSETRON 4 MG/1
4 TABLET, ORALLY DISINTEGRATING ORAL EVERY 4 HOURS PRN
Status: DISCONTINUED | OUTPATIENT
Start: 2025-02-18 | End: 2025-02-20 | Stop reason: HOSPADM

## 2025-02-18 RX ORDER — GALCANEZUMAB 120 MG/ML
120 INJECTION, SOLUTION SUBCUTANEOUS
COMMUNITY

## 2025-02-18 RX ORDER — LORAZEPAM 2 MG/ML
2 INJECTION INTRAMUSCULAR
Status: DISCONTINUED | OUTPATIENT
Start: 2025-02-18 | End: 2025-02-20 | Stop reason: HOSPADM

## 2025-02-18 RX ORDER — PAROXETINE 10 MG/1
10 TABLET, FILM COATED ORAL EVERY MORNING
Status: DISCONTINUED | OUTPATIENT
Start: 2025-02-18 | End: 2025-02-18

## 2025-02-18 RX ORDER — HYDROCODONE BITARTRATE AND ACETAMINOPHEN 5; 325 MG/1; MG/1
1 TABLET ORAL EVERY 6 HOURS PRN
Status: ON HOLD | COMMUNITY
Start: 2025-01-31 | End: 2025-02-20

## 2025-02-18 RX ORDER — ONDANSETRON 2 MG/ML
4 INJECTION INTRAMUSCULAR; INTRAVENOUS EVERY 4 HOURS PRN
Status: DISCONTINUED | OUTPATIENT
Start: 2025-02-18 | End: 2025-02-20 | Stop reason: HOSPADM

## 2025-02-18 RX ORDER — DONEPEZIL HYDROCHLORIDE 5 MG/1
5 TABLET, FILM COATED ORAL
COMMUNITY
Start: 2024-12-09

## 2025-02-18 RX ORDER — DIVALPROEX SODIUM 500 MG/1
500 TABLET, FILM COATED, EXTENDED RELEASE ORAL 2 TIMES DAILY
Status: DISCONTINUED | OUTPATIENT
Start: 2025-02-18 | End: 2025-02-20 | Stop reason: HOSPADM

## 2025-02-18 RX ORDER — ROPINIROLE 0.5 MG/1
2 TABLET, FILM COATED ORAL EVERY EVENING
Status: DISCONTINUED | OUTPATIENT
Start: 2025-02-18 | End: 2025-02-19

## 2025-02-18 RX ORDER — LACOSAMIDE 200 MG/1
200 TABLET ORAL 2 TIMES DAILY
COMMUNITY
Start: 2024-12-09

## 2025-02-18 RX ORDER — DIPHENHYDRAMINE HYDROCHLORIDE 50 MG/ML
25 INJECTION INTRAMUSCULAR; INTRAVENOUS EVERY 6 HOURS PRN
Status: DISCONTINUED | OUTPATIENT
Start: 2025-02-18 | End: 2025-02-19

## 2025-02-18 RX ORDER — ROPINIROLE 1 MG/1
2 TABLET, FILM COATED ORAL EVERY EVENING
Status: ON HOLD | COMMUNITY
End: 2025-02-20

## 2025-02-18 RX ORDER — SODIUM CHLORIDE, SODIUM LACTATE, POTASSIUM CHLORIDE, AND CALCIUM CHLORIDE .6; .31; .03; .02 G/100ML; G/100ML; G/100ML; G/100ML
500 INJECTION, SOLUTION INTRAVENOUS
Status: DISCONTINUED | OUTPATIENT
Start: 2025-02-18 | End: 2025-02-20 | Stop reason: HOSPADM

## 2025-02-18 RX ORDER — AMOXICILLIN 250 MG
2 CAPSULE ORAL EVERY EVENING
Status: DISCONTINUED | OUTPATIENT
Start: 2025-02-18 | End: 2025-02-20 | Stop reason: HOSPADM

## 2025-02-18 RX ORDER — HYDROCODONE BITARTRATE AND ACETAMINOPHEN 5; 325 MG/1; MG/1
1 TABLET ORAL ONCE
Refills: 0 | Status: COMPLETED | OUTPATIENT
Start: 2025-02-18 | End: 2025-02-18

## 2025-02-18 RX ORDER — ATORVASTATIN CALCIUM 40 MG/1
40 TABLET, FILM COATED ORAL EVERY EVENING
Status: DISCONTINUED | OUTPATIENT
Start: 2025-02-18 | End: 2025-02-20 | Stop reason: HOSPADM

## 2025-02-18 RX ORDER — RISPERIDONE 1 MG/1
3 TABLET ORAL 2 TIMES DAILY
Status: DISCONTINUED | OUTPATIENT
Start: 2025-02-18 | End: 2025-02-20 | Stop reason: HOSPADM

## 2025-02-18 RX ORDER — TRAZODONE HYDROCHLORIDE 100 MG/1
100 TABLET ORAL
Status: DISCONTINUED | OUTPATIENT
Start: 2025-02-18 | End: 2025-02-20 | Stop reason: HOSPADM

## 2025-02-18 RX ORDER — KETOROLAC TROMETHAMINE 15 MG/ML
15 INJECTION, SOLUTION INTRAMUSCULAR; INTRAVENOUS EVERY 6 HOURS PRN
Status: DISCONTINUED | OUTPATIENT
Start: 2025-02-18 | End: 2025-02-20 | Stop reason: HOSPADM

## 2025-02-18 RX ORDER — ESTRADIOL 0.5 MG/1
0.5 TABLET ORAL DAILY
COMMUNITY
Start: 2024-12-09

## 2025-02-18 RX ORDER — DONEPEZIL HYDROCHLORIDE 5 MG/1
5 TABLET, FILM COATED ORAL
Status: DISCONTINUED | OUTPATIENT
Start: 2025-02-18 | End: 2025-02-20 | Stop reason: HOSPADM

## 2025-02-18 RX ORDER — MAGNESIUM SULFATE 1 G/100ML
1 INJECTION INTRAVENOUS ONCE
Status: COMPLETED | OUTPATIENT
Start: 2025-02-18 | End: 2025-02-18

## 2025-02-18 RX ORDER — LACOSAMIDE 100 MG/1
200 TABLET ORAL 2 TIMES DAILY
Status: DISCONTINUED | OUTPATIENT
Start: 2025-02-18 | End: 2025-02-20 | Stop reason: HOSPADM

## 2025-02-18 RX ORDER — DULOXETIN HYDROCHLORIDE 60 MG/1
60 CAPSULE, DELAYED RELEASE ORAL 2 TIMES DAILY
Status: DISCONTINUED | OUTPATIENT
Start: 2025-02-18 | End: 2025-02-20 | Stop reason: HOSPADM

## 2025-02-18 RX ORDER — ZONISAMIDE 100 MG/1
300 CAPSULE ORAL EVERY EVENING
COMMUNITY
Start: 2024-12-29

## 2025-02-18 RX ORDER — SODIUM CHLORIDE 9 MG/ML
INJECTION, SOLUTION INTRAVENOUS CONTINUOUS
Status: DISCONTINUED | OUTPATIENT
Start: 2025-02-18 | End: 2025-02-19

## 2025-02-18 RX ORDER — ZONISAMIDE 100 MG/1
300 CAPSULE ORAL EVERY EVENING
Status: DISCONTINUED | OUTPATIENT
Start: 2025-02-18 | End: 2025-02-20 | Stop reason: HOSPADM

## 2025-02-18 RX ORDER — GABAPENTIN 300 MG/1
300 CAPSULE ORAL EVERY 8 HOURS
Status: DISCONTINUED | OUTPATIENT
Start: 2025-02-18 | End: 2025-02-18

## 2025-02-18 RX ORDER — KETOROLAC TROMETHAMINE 15 MG/ML
15 INJECTION, SOLUTION INTRAMUSCULAR; INTRAVENOUS ONCE
Status: COMPLETED | OUTPATIENT
Start: 2025-02-18 | End: 2025-02-18

## 2025-02-18 RX ADMIN — ZONISAMIDE 300 MG: 100 CAPSULE ORAL at 17:49

## 2025-02-18 RX ADMIN — DONEPEZIL HYDROCHLORIDE 5 MG: 5 TABLET, FILM COATED ORAL at 21:04

## 2025-02-18 RX ADMIN — SENNOSIDES AND DOCUSATE SODIUM 2 TABLET: 50; 8.6 TABLET ORAL at 17:47

## 2025-02-18 RX ADMIN — SODIUM CHLORIDE: 9 INJECTION, SOLUTION INTRAVENOUS at 09:01

## 2025-02-18 RX ADMIN — POTASSIUM PHOSPHATE, MONOBASIC AND POTASSIUM PHOSPHATE, DIBASIC 15 MMOL: 224; 236 INJECTION, SOLUTION, CONCENTRATE INTRAVENOUS at 07:45

## 2025-02-18 RX ADMIN — FENTANYL CITRATE 50 MCG: 50 INJECTION, SOLUTION INTRAMUSCULAR; INTRAVENOUS at 03:30

## 2025-02-18 RX ADMIN — SODIUM CHLORIDE: 9 INJECTION, SOLUTION INTRAVENOUS at 06:10

## 2025-02-18 RX ADMIN — ROPINIROLE HYDROCHLORIDE 2 MG: 0.5 TABLET, FILM COATED ORAL at 17:46

## 2025-02-18 RX ADMIN — LACOSAMIDE 200 MG: 100 TABLET, FILM COATED ORAL at 06:54

## 2025-02-18 RX ADMIN — OXYCODONE 5 MG: 5 TABLET ORAL at 19:41

## 2025-02-18 RX ADMIN — KETOROLAC TROMETHAMINE 15 MG: 15 INJECTION, SOLUTION INTRAMUSCULAR; INTRAVENOUS at 06:14

## 2025-02-18 RX ADMIN — OXYCODONE 5 MG: 5 TABLET ORAL at 11:17

## 2025-02-18 RX ADMIN — ACETAMINOPHEN 650 MG: 325 TABLET ORAL at 07:34

## 2025-02-18 RX ADMIN — DIPHENHYDRAMINE HYDROCHLORIDE 25 MG: 50 INJECTION, SOLUTION INTRAMUSCULAR; INTRAVENOUS at 18:24

## 2025-02-18 RX ADMIN — ATORVASTATIN CALCIUM 40 MG: 40 TABLET, FILM COATED ORAL at 17:48

## 2025-02-18 RX ADMIN — DIVALPROEX SODIUM 500 MG: 500 TABLET, EXTENDED RELEASE ORAL at 07:34

## 2025-02-18 RX ADMIN — DIPHENHYDRAMINE HYDROCHLORIDE 25 MG: 50 INJECTION, SOLUTION INTRAMUSCULAR; INTRAVENOUS at 12:29

## 2025-02-18 RX ADMIN — KETOROLAC TROMETHAMINE 15 MG: 15 INJECTION, SOLUTION INTRAMUSCULAR; INTRAVENOUS at 18:24

## 2025-02-18 RX ADMIN — DIPHENHYDRAMINE HYDROCHLORIDE 25 MG: 25 TABLET ORAL at 06:14

## 2025-02-18 RX ADMIN — KETOROLAC TROMETHAMINE 15 MG: 15 INJECTION, SOLUTION INTRAMUSCULAR; INTRAVENOUS at 12:28

## 2025-02-18 RX ADMIN — HYDROCODONE BITARTRATE AND ACETAMINOPHEN 1 TABLET: 5; 325 TABLET ORAL at 02:10

## 2025-02-18 RX ADMIN — TRAZODONE HYDROCHLORIDE 100 MG: 50 TABLET ORAL at 21:04

## 2025-02-18 RX ADMIN — LACOSAMIDE 200 MG: 100 TABLET, FILM COATED ORAL at 18:36

## 2025-02-18 RX ADMIN — OXYCODONE 5 MG: 5 TABLET ORAL at 15:39

## 2025-02-18 RX ADMIN — DIVALPROEX SODIUM 500 MG: 500 TABLET, EXTENDED RELEASE ORAL at 17:48

## 2025-02-18 RX ADMIN — MAGNESIUM SULFATE IN DEXTROSE 1 G: 10 INJECTION, SOLUTION INTRAVENOUS at 07:42

## 2025-02-18 ASSESSMENT — PAIN DESCRIPTION - PAIN TYPE
TYPE: ACUTE PAIN

## 2025-02-18 ASSESSMENT — ENCOUNTER SYMPTOMS
DEPRESSION: 0
NAUSEA: 0
SHORTNESS OF BREATH: 0
HEADACHES: 1
ABDOMINAL PAIN: 0
BRUISES/BLEEDS EASILY: 0
DIZZINESS: 1
FEVER: 0
FOCAL WEAKNESS: 0
FALLS: 1
COUGH: 0
SENSORY CHANGE: 0
CHILLS: 0
DOUBLE VISION: 0
PALPITATIONS: 0
SPEECH CHANGE: 0
HEARTBURN: 0
VOMITING: 0
MYALGIAS: 1
WEAKNESS: 1
BLURRED VISION: 0

## 2025-02-18 ASSESSMENT — LIFESTYLE VARIABLES: SUBSTANCE_ABUSE: 0

## 2025-02-18 ASSESSMENT — FIBROSIS 4 INDEX: FIB4 SCORE: 1.37

## 2025-02-18 NOTE — ASSESSMENT & PLAN NOTE
2/19:  Ataxia, weakness, frequent falls  No acute findings on CT head  Rule out acute CVA-check MRI brain if continues to have dizziness  Question if other process causing a possible SIADH issue?    ASA/statin for now  PT/OT/ST

## 2025-02-18 NOTE — THERAPY
"Speech Language Pathology   Clinical Swallow Evaluation     Patient Name: Enedelia Pang  AGE:  54 y.o., SEX:  female  Medical Record #: [id number]  Date of Service: 2/18/2025    History of Present Illness  53 y/o F admit 2/18 after a GLF- pt also had a GLF 5 days ago. Pt being evaluated for dizziness and ataxia- an MRI is pending.     PMHx: pseudotumor cerebri s/p  shunt, migraines, chronic pain syndrome with neurostimulator, vasculitis, generalized anxiety, arthritis, autoimmune disorder, back pain, depression, pituitary abnormality ([facility]) (2002), Seizure ([facility]) (started 2017), Sepsis ([facility]) (8/30/2015), Staph infection, and Stroke ([facility]) (2007).    CXR 2/18: \"1.  No acute cardiopulmonary disease.  2.  Trace right pleural effusion\"    Head CT 2/18: \"1.  No acute intracranial abnormality is identified, there are nonspecific white matter changes, commonly associated with small vessel ischemic disease.  Associated mild cerebral atrophy is noted.  2.  Mild bilateral ventricular dilatation with ventriculostomy tube in place, may represent ex vacuo changes, stable since prior study  3.  Atherosclerosis\"    Remote hx of  services from 6/2021. Pt had a tracheostomy at that time and was on a mildly thick liquids, soft & bite-sized solids diet.     General Information:  Vitals  O2 Delivery Device: None - Room Air  Level of Consciousness: Alert, Awake     Orientation: Oriented x 4  Follows Directives: Yes    Prior Living Situation & Level of Function:  Comments: Pt lives with her  who assists her with some iADLs. She reported she does not use any assistive devices for ambulations. SHe does not drive and does not work (is on disability)     Communication: WNL  Swallowing: Pt reports a few weeks of globus sensation with a few \"larger capsule pills\".     Oral Mechanism Evaluation:  Dentition: Natural dentition   Facial Symmetry: Equal  Facial Sensation: Equal     Labial Observations:  (Reduced ROM bilaterally, mild) " "  Lingual Observations: Lingual fasciculations (Midline, some mild fasciculations upon protrusion)  Motor Speech: Trace articulatory imprecision. Pt reported she felt her speech was at baseline and she had no concerns relating to her speech       Laryngeal Function:  Secretion Management: Adequate  Voice Quality: WFL     Cough: Perceptually WNL     Subjective  Pt seen A&Ox4 saturating on room air    Assessment  Current Method of Nutrition: NPO until cleared by speech pathology  Positioning: Vasquez's (60-90 degrees)  Bolus Administration: Patient    O2 Delivery Device: None - Room Air  Factor(s) Affecting Performance: None     Swallowing Trials:  Swallowing Trials  Thin Liquid (TN0): WFL  Pureed (PU4): WFL  Easy to Chew (EC7): WFL  Regular (RG7): Impaired    Comments:   The pt self-fed w/ appropriate rate & volume control. Bolus stripping and containment was WNL. Oral stage c/b functional, albeit mildly prolonged for RG7, mastication and no gross oral residue post swallow. No upper airway wetness, throat clearing or coughing was noted. Pt denied globus sensation on textures completed on this date. She endorsed some subacute globus to large pills that began a few weeks ago. She localized the globus sensation to the cervical area.     Clinical Impressions  Signs of oropharyngeal swallow function intact for a thin/all liquids, easy to chew solids diet. The pt politely declined to participate in instrumentation (I.e., MBS) to assess her few weeks onset of globus sensation to large pills. In the context of acute neuro sx with hx of stroke, ST to f/u 1-2x to assess diet tolerance.     An MRI and Speech-Language/Cognitive evaluation (if indicated) is pending.      Recommendations  Diet Consistency: Thin/all Liquids, Easy to Chew Solids  Instrumentation:  (Offered an MBS to assess the pt's subacute globus sensation, but the pt declined citing that \"it (dysphagia) doesn't really bother me\")  Medication: Cut large pills, One " pill at a time  Supervision: Assist with meal tray set up  Positioning: Fully upright and midline during oral intake  Risk Management : Small bites/sips, Slow rate of intake  Oral Care: BID  Consult Referral(s): Occupational Therapist, Physical Therapist    SLP Treatment Plan  Treatment Plan: Dysphagia Treatment  SLP Frequency: 2x Per Week  Estimated Duration: Until Therapy Goals Met    Anticipated Discharge Needs  Discharge Recommendations: Anticipate that the patient will have no further speech therapy needs after discharge from the hospital   Therapy Recommendations Upon DC: Not Indicated      Patient / Family Goals  Patient / Family Goal #1: The larger capsules I take sometimes stick  Short Term Goals  Short Term Goal # 1: Pt will tolerate a thin/all liquids, easy to chew solids diet w/ no signs of aspiration related pulmonary complications    Tracy Bautista, SLP

## 2025-02-18 NOTE — ED TRIAGE NOTES
Chief Complaint   Patient presents with    Dizziness    GLF     Pt BIBA from home for a GLF that happened yesterday, as well as a fall 5 fays ago. Pt reports feeling dizzy often, causing her to fall. Pt arrives with bruising to right side of face. (-) LOC, (-) thinners.   A&Ox4,  PTA, given 4mg Zofran PTA.

## 2025-02-18 NOTE — DISCHARGE PLANNING
[facility] Transitional [facility] Coordination    Referral from: Dr. Coombs  Insurance Provider on Facesheet:  [employer]  Potential Rehab diagnosis:      Chart review indicates patient has ongoing medical management and may have therapy needs to possibly meet inpatient rehab facility criteria with the goal of returning to community.      D/C Support:  Spouse    Physiatry consult pended, waiting for additional information.  Workup ongoing, MR brain pending.  PT/OT pending as clinically appropriate.  TCC will follow.  Please reach out sooner if PMR consult requested for medical management.     Last Covid test:    Thank you for the referral.

## 2025-02-18 NOTE — ASSESSMENT & PLAN NOTE
Continue home seizure meds --zonisamide, Vimpat, Depakote  Seizure precautions, aspiration precautions given profound hyponatremia

## 2025-02-18 NOTE — ED NOTES
Report from CAROLINA See. Pt resting in bed with even and unlabored respirations on room air. US at bedside.

## 2025-02-18 NOTE — H&P
Hospital Medicine History & Physical Note    Date of Service  2/18/2025    Primary Care Physician  Elliott Power M.D.    Consultants  ICUI (Dr. Quiroz) - to [facility]    Code Status  Full Code    Chief Complaint  Chief Complaint   Patient presents with    Dizziness    GLF     Pt BIBA from home for a GLF that happened yesterday, as well as a fall 5 fays ago. Pt reports feeling dizzy often, causing her to fall. Pt arrives with bruising to right side of face. (-) LOC, (-) thinners.   A&Ox4,  PTA, given 4mg Zofran PTA.        History of Presenting Illness  Enedelia Pang is a 54 y.o. female with history of pseudotumor cerebri s/p  shunt, migraines, chronic pain syndrome with neurostimulator, vasculitis who presented 2/18/2025 with evaluation for dizziness, frequent falls.  Patient reported having 4 falls since 12/2025, ataxia.  She reported falling approximately just last week, bruising of face, did not seek medical attention at the time.  She had a fall earlier today, prompting her to come to emergency room for evaluation.  In ER, no acute findings on CT head.  CMP concerning for sodium of 114.  Question as to symptomatic hyponatremia with frequent falls.  ICU was consulted, recommended [facility] admission.  Therefore, admitted to medicine service for further evaluation and treatment.    I discussed the plan of care with patient, bedside RN, charge RN, pharmacy, and critical care.    Review of Systems  Review of Systems   Constitutional:  Positive for malaise/fatigue. Negative for chills and fever.   HENT:  Negative for hearing loss and tinnitus.    Eyes:  Negative for blurred vision and double vision.   Respiratory:  Negative for cough and shortness of breath.    Cardiovascular:  Negative for chest pain and palpitations.   Gastrointestinal:  Negative for abdominal pain, heartburn, nausea and vomiting.   Genitourinary:  Negative for dysuria and urgency.   Musculoskeletal:  Positive for falls and myalgias.  Negative for joint pain.   Skin:  Negative for itching and rash.   Neurological:  Positive for dizziness, weakness and headaches. Negative for sensory change, speech change and focal weakness.   Endo/Heme/Allergies:  Negative for environmental allergies. Does not bruise/bleed easily.   Psychiatric/Behavioral:  Negative for depression and substance abuse.    All other systems reviewed and are negative.      Past Medical History   has a past medical history of Allergy, unspecified not elsewhere classified, Anemia (10/05/2017), Anesthesia, Anxiety, Anxiety, generalized, Arthritis, autoimmune, Autoimmune disorder ([facility]), Back pain, Clostridium difficile diarrhea (2014), Depression, Elevated liver enzymes (2017), Fall, H/O Clostridium difficile infection (2014), MRSA infection (2003), Hydrocephalus ([facility]) (2015), Hyponatremia (9/28/2019), Joint pain (09/10/2019), Migraine with aura, with intractable migraine, so stated, without mention of status migrainosus, MRSA (methicillin resistant Staphylococcus aureus) (08/2015), MRSA (methicillin resistant Staphylococcus aureus) (12/2017), MRSA (methicillin resistant Staphylococcus aureus) (2018), Pituitary abnormality ([facility]) (2002), Port or reservoir infection (10/3/2015), Requires supplemental oxygen, Seizure ([facility]) (started 2017), Sepsis ([facility]) (8/30/2015), Staph infection, and Stroke ([facility]) (2007).    Surgical History   has a past surgical history that includes tonsillectomy (1985); other neurological surg (0568-1554); irrigation & debridement neuro (N/A, 6/28/2015); lumbar exploration (N/A, 8/31/2015); spinal cord stimulator (12/19/2016);  shunt insertion (5/31/2017); liver biopsy (07/24/2017); cholecystectomy (2001); appendectomy (1982); spinal cord stimulator (05/24/2018); spinal cord stimulator (2003); knee arthroscopy (Right, 1990); full thickness skin graft (Left, 04/2018); other surgical procedure (11/10/2017); other neurological surg (08/2015); endometrial ablation (2001);  "tubal coagulation laparoscopic bilateral (Bilateral, 2001); pr ins/rplc spi npg/rcvr pocket (9/13/2019); other (Left, 01/23/2020); pr thoracoscopy,dx no bx (Right, 4/28/2021); decortication (Right, 4/28/2021); thoracotomy (Right, 5/17/2021); and decortication (5/17/2021).     Family History  family history includes Alcohol abuse in her father; Diabetes in her father; Multiple Sclerosis in her mother.   Family history reviewed with patient. There is family history that is pertinent to the chief complaint.     Social History   reports that she has never smoked. She has never used smokeless tobacco. She reports that she does not currently use alcohol. She reports that she does not use drugs.    Allergies  Allergies   Allergen Reactions    Sumatriptan Anaphylaxis     Historical=\"Heart stops.\" Reaction  between 1995 to 1997    Prochlorperazine Rash and Swelling     Tongue swelling. Reaction as a teen. (compazine)  Tolerated Phenergan on 02/24/15    Vancomycin Shortness of Breath and Rash     Reaction in 2005.  D/W patient 8/31/15 - tolerated loading dose of vancomycin administered on 8/30/15 with some itching to chest with decreased infusion rate. Red Man Syndrome.  2018-tolerated slow drip with benadryl pre-med-had no problems.       Medications  Prior to Admission Medications   Prescriptions Last Dose Informant Patient Reported? Taking?   AIMOVIG 70 MG/ML Solution Auto-injector  Significant Other Yes No   Sig: Inject 70 mg under the skin Q30 DAYS.   DULoxetine (CYMBALTA) 60 MG Cap DR Particles delayed-release capsule  Significant Other Yes No   Sig: Take 60 mg by mouth 2 times a day.   Multiple Vitamins-Minerals (WOMENS MULTIVITAMIN) Tab  Significant Other Yes No   Sig: Take 1 tablet by mouth 2 times a day. \"Equate Women's Multivitamin/Multimineral\"   PARoxetine (PAXIL) 10 MG Tab  Significant Other Yes No   Sig: Take 10 mg by mouth every morning.   Ubrogepant (UBRELVY) 100 MG Tab  Significant Other Yes No   Sig: Take " 100 mg by mouth 2 times a day as needed (Migraine). May take a 2nd dose after 4 hours if migraine persists. Max of 2 tablets in 24 hours.   aspirin 81 MG EC tablet  Significant Other Yes No   Sig: Take 81 mg by mouth every morning.   diphenhydrAMINE (BENADRYL) 50 MG/ML Solution  Significant Other Yes No   Sig: Inject 75 mg into the shoulder, thigh, or buttocks 3 times a day as needed (Migraine).   divalproex ER (DEPAKOTE ER) 250 MG TABLET SR 24 HR  Significant Other Yes No   Sig: Take 500 mg by mouth 3 times a day. 2 tablets = 500 mg.   gabapentin (NEURONTIN) 300 MG Cap   No No   Sig: Take 1 capsule by mouth every 8 hours.   risperiDONE (RISPERDAL) 2 MG Tab  Significant Other Yes No   Sig: Take 2 mg by mouth 2 times a day.      Facility-Administered Medications: None       Physical Exam  Temp:  [36.6 °C (97.8 °F)] 36.6 °C (97.8 °F)  Pulse:  [90-97] 90  Resp:  [16-18] 18  BP: (113-130)/(55-70) 113/55  SpO2:  [95 %-98 %] 95 %  Blood Pressure: 115/60   Temperature: 36.6 °C (97.8 °F)   Pulse: 96   Respiration: 18   Pulse Oximetry: 95 %       Physical Exam  Vitals and nursing note reviewed.   Constitutional:       Appearance: She is ill-appearing.   HENT:      Head: Normocephalic and atraumatic.      Comments: Superficial laceration, bruising in face     Nose: Nose normal.      Mouth/Throat:      Mouth: Mucous membranes are dry.      Pharynx: Oropharynx is clear.   Eyes:      General: No scleral icterus.     Extraocular Movements: Extraocular movements intact.   Cardiovascular:      Rate and Rhythm: Normal rate and regular rhythm.      Pulses: Normal pulses.      Heart sounds:      No friction rub.   Pulmonary:      Effort: No respiratory distress.      Breath sounds: No wheezing or rales.   Chest:      Chest wall: No tenderness.   Abdominal:      General: There is no distension.      Tenderness: There is no abdominal tenderness. There is no guarding or rebound.   Musculoskeletal:         General: Normal range of motion.  "     Cervical back: Neck supple. No tenderness.      Right lower leg: No edema.      Left lower leg: No edema.   Skin:     Capillary Refill: Capillary refill takes less than 2 seconds.   Neurological:      General: No focal deficit present.      Mental Status: She is alert and oriented to person, place, and time.   Psychiatric:         Mood and Affect: Mood normal.         Laboratory:  Recent Labs     02/18/25 0232   WBC 7.6   RBC 4.33   HEMOGLOBIN 13.7   HEMATOCRIT 37.6   MCV 86.8   MCH 31.6   MCHC 36.4*   RDW 45.1   PLATELETCT 281   MPV 8.6*     Recent Labs     02/18/25 0232   SODIUM 114*   POTASSIUM 3.5*   CHLORIDE 83*   CO2 20   GLUCOSE 90   BUN 8   CREATININE 0.57   CALCIUM 8.6     Recent Labs     02/18/25 0232   ALTSGPT 70*   ASTSGOT 28   ALKPHOSPHAT 104*   TBILIRUBIN 0.3   DBILIRUBIN <0.2   GLUCOSE 90     Recent Labs     02/18/25 0232   INR 0.92     No results for input(s): \"NTPROBNP\" in the last 72 hours.      No results for input(s): \"TROPONINT\" in the last 72 hours.    Imaging:  DX-CHEST-LIMITED (1 VIEW)   Final Result         1.  No acute cardiopulmonary disease.   2.  Trace right pleural effusion      CT-HEAD W/O   Final Result         1.  No acute intracranial abnormality is identified, there are nonspecific white matter changes, commonly associated with small vessel ischemic disease.  Associated mild cerebral atrophy is noted.   2.  Mild bilateral ventricular dilatation with ventriculostomy tube in place, may represent ex vacuo changes, stable since prior study   3.  Atherosclerosis.               MR-BRAIN-W/O    (Results Pending)   EC-ECHOCARDIOGRAM COMPLETE W/O CONT    (Results Pending)   US-RUQ    (Results Pending)       X-Ray:  I have personally reviewed the images and compared with prior images.  EKG:  I have personally reviewed the images and compared with prior images.    Assessment/Plan:  Justification for Admission Status  I anticipate this patient will require at least 2 midnights of " hospitalization, therefore appropriate for inpatient status.      * Acute hyponatremia- (present on admission)  Assessment & Plan  Sodium 114 on 2/18/2025 @02:32  Target slow correction to avoid CPM    Suspect secondary to hypovolemia  NS @50cc/hr  -target 6 to 8 meq correction every 24-hours  Sodium check every 4 hours    Hold agents known to cause hyponatremia  No acute findings on CT head  Neurocheck every 4 hours    Aspiration precautions, seizure precautions  As needed IV Ativan for breakthrough seizure  ICU consulted, agreeable for IMCU admission  Admit to IMCU for close hemodynamic monitoring      Ataxia  Assessment & Plan  Ataxia, weakness, frequent falls  No acute findings on CT head  Rule out acute CVA-check MRI brain  Check TTE  ASA/statin for now  PT/OT/ST    Hypophosphatemia  Assessment & Plan  Replace as appropriate    Spinal cord stimulator status- (present on admission)  Assessment & Plan  Per history    Substance dependence (HCC)- (present on admission)  Assessment & Plan  Prior history  Denies current use  Check UDS    Seizures (HCC)- (present on admission)  Assessment & Plan  Continue home seizure meds --zonisamide, Vimpat, Depakote  Seizure precautions, aspiration precautions given profound hyponatremia    Hyperlipidemia- (present on admission)  Assessment & Plan  Statin    Hypokalemia- (present on admission)  Assessment & Plan  Replace as appropriate    Mood disorder (HCC)- (present on admission)  Assessment & Plan  On SSRI  --hold all for now given concerning hyponatremia    Transaminitis- (present on admission)  Assessment & Plan  Check ETOH  Check RUQ US        VTE prophylaxis: SCDs/TEDs    Patient is critically ill due to acute hyponatremia, seizure disorder, high probability of further deterioration and high risk of propagation to CPM requiring close hemodynamic monitoring in IMCU    Critical care time: 80 minutes spent in chart review, medical management, coordinating care with patient/ER  staff/IMCU staff/RN/pharmacy/consulting provider/frequent reevaluation of patient clinical response to treatment

## 2025-02-18 NOTE — ED NOTES
RN at bedside to transport pt to [facility]. Pt alert and oriented with even and regular respirations on room air, medications continued to floor, pt on cardiac monitor for transport. Pt with all belongings and chart. Report called to receiving RN.

## 2025-02-18 NOTE — ASSESSMENT & PLAN NOTE
2/19:  Na:125 <127  Increase NS from 50cc to 75cc  Monitor I/O's, BMP if stable can change to tid.  If no improving Na level will add NaCl tabs

## 2025-02-18 NOTE — ED PROVIDER NOTES
ED Provider Note    CHIEF COMPLAINT  Chief Complaint   Patient presents with    Dizziness    GLF     Pt BIBA from home for a GLF that happened yesterday, as well as a fall 5 fays ago. Pt reports feeling dizzy often, causing her to fall. Pt arrives with bruising to right side of face. (-) LOC, (-) thinners.   A&Ox4,  PTA, given 4mg Zofran PTA.        EXTERNAL RECORDS REVIEWED  Other ER notes reviewed from 12/27/2024 and 1/19/2025.    HPI/ROS  LIMITATION TO HISTORY   Select: : None  OUTSIDE HISTORIAN(S):  None    Enedelia Domitila Pang is a 54 y.o. female who presents to the emergency department with recurrent mechanical fall.  States that she was outside walking her dog again tonight when she believes that she tripped over a tree root and then felt a pop.  Has had multiple recent falls and multiple recent ER visits for similar stories.  States that she is currently following with [facility] for clavicular fracture.  Actually had CT imaging of the clavicle completed earlier today at [facility].  Tonight continues to feel generally unsteady with gait and having slight dizziness which has been persistent since her prior falls.  She continues to have bruising to her face from prior falls as well.    PAST MEDICAL HISTORY   has a past medical history of Allergy, unspecified not elsewhere classified, Anemia (10/05/2017), Anesthesia, Anxiety, Anxiety, generalized, Arthritis, autoimmune, Autoimmune disorder ([facility]), Back pain, Clostridium difficile diarrhea (2014), Depression, Elevated liver enzymes (2017), Fall, H/O Clostridium difficile infection (2014), MRSA infection (2003), Hydrocephalus ([facility]) (2015), Hyponatremia (9/28/2019), Joint pain (09/10/2019), Migraine with aura, with intractable migraine, so stated, without mention of status migrainosus, MRSA (methicillin resistant Staphylococcus aureus) (08/2015), MRSA (methicillin resistant Staphylococcus aureus) (12/2017), MRSA (methicillin resistant Staphylococcus aureus)  (2018), Pituitary abnormality (HCC) (2002), Port or reservoir infection (10/3/2015), Requires supplemental oxygen, Seizure (HCC) (started 2017), Sepsis (HCC) (8/30/2015), Staph infection, and Stroke (HCC) (2007).    SURGICAL HISTORY   has a past surgical history that includes tonsillectomy (1985); other neurological surg (3055-3122); irrigation & debridement neuro (N/A, 6/28/2015); lumbar exploration (N/A, 8/31/2015); spinal cord stimulator (12/19/2016);  shunt insertion (5/31/2017); liver biopsy (07/24/2017); cholecystectomy (2001); appendectomy (1982); spinal cord stimulator (05/24/2018); spinal cord stimulator (2003); knee arthroscopy (Right, 1990); full thickness skin graft (Left, 04/2018); other surgical procedure (11/10/2017); other neurological surg (08/2015); endometrial ablation (2001); tubal coagulation laparoscopic bilateral (Bilateral, 2001); ins/rplc spi npg/rcvr pocket (9/13/2019); other (Left, 01/23/2020); thoracoscopy,dx no bx (Right, 4/28/2021); decortication (Right, 4/28/2021); thoracotomy (Right, 5/17/2021); and decortication (5/17/2021).    FAMILY HISTORY  Family History   Problem Relation Age of Onset    Multiple Sclerosis Mother     Diabetes Father     Alcohol abuse Father        SOCIAL HISTORY  Social History     Tobacco Use    Smoking status: Never    Smokeless tobacco: Never   Vaping Use    Vaping status: Never Used   Substance and Sexual Activity    Alcohol use: Not Currently    Drug use: Never    Sexual activity: Not on file       CURRENT MEDICATIONS  Home Medications       Reviewed by Matthew Tripp (Pharmacy Tech) on 02/18/25 at 0508  Med List Status: Complete     Medication Last Dose Status   aspirin 81 MG EC tablet 1/18/2025 Active   diphenhydrAMINE (BENADRYL) 50 MG/ML Solution 1/18/2025 Active   divalproex ER (DEPAKOTE ER) 250 MG TABLET SR 24 HR 2/17/2025 Active   donepezil (ARICEPT) 5 MG Tab 2/17/2025 Active   DULoxetine (CYMBALTA) 60 MG Cap DR Particles delayed-release  "capsule 2/17/2025 Active   estradiol (ESTRACE) 0.5 MG tablet 2/17/2025 Active   HYDROcodone-acetaminophen (NORCO) 5-325 MG Tab per tablet 2/5/2025 Active   lacosamide (VIMPAT) 200 MG Tab tablet 2/17/2025 Active   Multiple Vitamins-Minerals (WOMENS MULTIVITAMIN) Tab 2/17/2025 Active   risperiDONE (RISPERDAL) 3 MG Tab 2/17/2025 Active   ROPINIRole (REQUIP) 1 MG Tab 2/11/2025 Active   traZODone (DESYREL) 100 MG Tab 2/17/2025 Active   Ubrogepant (UBRELVY) 100 MG Tab 2/4/2025 Active   zonisamide (ZONEGRAN) 100 MG Cap 2/17/2025 Active                  Audit from Redirected Encounters    **Home medications have not yet been reviewed for this encounter**         ALLERGIES  Allergies   Allergen Reactions    Sumatriptan Anaphylaxis     Historical=\"Heart stops.\" Reaction  between 1995 to 1997    Prochlorperazine Rash and Swelling     Tongue swelling. Reaction as a teen. (compazine)  Tolerated Phenergan on 02/24/15    Vancomycin Shortness of Breath and Rash     Reaction in 2005.  D/W patient 8/31/15 - tolerated loading dose of vancomycin administered on 8/30/15 with some itching to chest with decreased infusion rate. Red Man Syndrome.  2018-tolerated slow drip with benadryl pre-med-had no problems.       PHYSICAL EXAM  VITAL SIGNS: /55   Pulse 90   Temp 36.6 °C (97.8 °F) (Temporal)   Resp 18   Ht 1.6 m (5' 3\")   Wt 61.7 kg (136 lb)   SpO2 95%   BMI 24.09 kg/m²      Pulse ox interpretation: I interpret this pulse ox as normal.  Constitutional: Alert in no apparent distress.  HENT:  Bilateral external ears normal, Nose normal.  Scabbed laceration to mid forehead.  Significant bruising to right face to include periorbital and right cheek (multistage ecchymosis)   Eyes: Pupils are equal and reactive.  No entrapment.  Neck: Normal range of motion, No tenderness, Supple  Cardiovascular: Regular rate and rhythm, no murmurs.   Thorax & Lungs: Normal breath sounds, No respiratory distress, No wheezing, No chest tenderness. "   Skin: Warm, Dry, No erythema, No rash.   Musculoskeletal: Good range of motion in all major joints. No tenderness to palpation or major deformities noted.   Neurologic: Alert , Normal motor function, Normal sensory function, No focal deficits noted.   Psychiatric: Affect normal, Judgment normal, Mood normal.         EKG/LABS  Results for orders placed or performed during the hospital encounter of 25   EKG    Collection Time: 25  2:14 AM   Result Value Ref Range    Report       [facility] Emergency Dept.    Test Date:  2025  Pt Name:    BRIGID DAWSON                 Department: ER  MRN:        [id number]                      Room:        13  Gender:     Female                       Technician: [id number]  :        1970                   Requested By:ER TRIAGE PROTOCOL  Order #:    [id number]                    Reading MD:    Measurements  Intervals                                Axis  Rate:       87                           P:          64  AR:         138                          QRS:        2  QRSD:       104                          T:          21  QT:         395  QTc:        476    Interpretive Statements  Sinus rhythm  JILLIAN, consider biatrial enlargement  Compared to ECG 2023 22:29:20  No significant changes     CBC WITH DIFFERENTIAL    Collection Time: 25  2:32 AM   Result Value Ref Range    WBC 7.6 4.8 - 10.8 K/uL    RBC 4.33 4.20 - 5.40 M/uL    Hemoglobin 13.7 12.0 - 16.0 g/dL    Hematocrit 37.6 37.0 - 47.0 %    MCV 86.8 81.4 - 97.8 fL    MCH 31.6 27.0 - 33.0 pg    MCHC 36.4 (H) 32.2 - 35.5 g/dL    RDW 45.1 35.9 - 50.0 fL    Platelet Count 281 164 - 446 K/uL    MPV 8.6 (L) 9.0 - 12.9 fL    Neutrophils-Polys 81.20 (H) 44.00 - 72.00 %    Lymphocytes 13.50 (L) 22.00 - 41.00 %    Monocytes 4.30 0.00 - 13.40 %    Eosinophils 0.10 0.00 - 6.90 %    Basophils 0.40 0.00 - 1.80 %    Immature Granulocytes 0.50 0.00 - 0.90 %    Nucleated RBC 0.00 0.00 - 0.20 /100 WBC     Neutrophils (Absolute) 6.20 1.82 - 7.42 K/uL    Lymphs (Absolute) 1.03 1.00 - 4.80 K/uL    Monos (Absolute) 0.33 0.00 - 0.85 K/uL    Eos (Absolute) 0.01 0.00 - 0.51 K/uL    Baso (Absolute) 0.03 0.00 - 0.12 K/uL    Immature Granulocytes (abs) 0.04 0.00 - 0.11 K/uL    NRBC (Absolute) 0.00 K/uL   BASIC METABOLIC PANEL    Collection Time: 02/18/25  2:32 AM   Result Value Ref Range    Sodium 114 (LL) 135 - 145 mmol/L    Potassium 3.5 (L) 3.6 - 5.5 mmol/L    Chloride 83 (L) 96 - 112 mmol/L    Co2 20 20 - 33 mmol/L    Glucose 90 65 - 99 mg/dL    Bun 8 8 - 22 mg/dL    Creatinine 0.57 0.50 - 1.40 mg/dL    Calcium 8.6 8.5 - 10.5 mg/dL    Anion Gap 11.0 7.0 - 16.0   ESTIMATED GFR    Collection Time: 02/18/25  2:32 AM   Result Value Ref Range    GFR (CKD-EPI) 109 >60 mL/min/1.73 m 2   Prothrombin time (INR)    Collection Time: 02/18/25  2:32 AM   Result Value Ref Range    PT 12.4 12.0 - 14.6 sec    INR 0.92 0.87 - 1.13   Sed Rate    Collection Time: 02/18/25  2:32 AM   Result Value Ref Range    Sed Rate Westergren 5 0 - 25 mm/hour   HEPATIC FUNCTION PANEL    Collection Time: 02/18/25  2:32 AM   Result Value Ref Range    Alkaline Phosphatase 104 (H) 30 - 99 U/L    AST(SGOT) 28 12 - 45 U/L    ALT(SGPT) 70 (H) 2 - 50 U/L    Total Bilirubin 0.3 0.1 - 1.5 mg/dL    Direct Bilirubin <0.2 0.1 - 0.5 mg/dL    Indirect Bilirubin see below 0.0 - 1.0 mg/dL    Albumin 4.7 3.2 - 4.9 g/dL    Total Protein 7.6 6.0 - 8.2 g/dL   MAGNESIUM    Collection Time: 02/18/25  2:32 AM   Result Value Ref Range    Magnesium 1.8 1.5 - 2.5 mg/dL   PHOSPHORUS    Collection Time: 02/18/25  2:32 AM   Result Value Ref Range    Phosphorus 2.3 (L) 2.5 - 4.5 mg/dL   PROCALCITONIN    Collection Time: 02/18/25  2:32 AM   Result Value Ref Range    Procalcitonin <0.05 <0.25 ng/mL   CORTISOL    Collection Time: 02/18/25  2:32 AM   Result Value Ref Range    Cortisol 15.7 0.0 - 23.0 ug/dL   CRP QUANTITIVE (NON-CARDIAC)    Collection Time: 02/18/25  2:32 AM   Result  Value Ref Range    Stat C-Reactive Protein <0.30 0.00 - 0.75 mg/dL   LDH    Collection Time: 02/18/25  2:32 AM   Result Value Ref Range    LDH Total 218 107 - 266 U/L   CREATINE KINASE    Collection Time: 02/18/25  2:32 AM   Result Value Ref Range    CPK Total 73 0 - 154 U/L   PROLACTIN    Collection Time: 02/18/25  2:32 AM   Result Value Ref Range    Prolactin 74.30 (H) 2.80 - 26.00 ng/mL       I have independently interpreted this EKG    RADIOLOGY/PROCEDURES   I have independently interpreted the diagnostic imaging associated with this visit and am waiting the final reading from the radiologist.   My preliminary interpretation is as follows: No large intracranial hemorrhage    Radiologist interpretation:  CT-HEAD W/O   Final Result         1.  No acute intracranial abnormality is identified, there are nonspecific white matter changes, commonly associated with small vessel ischemic disease.  Associated mild cerebral atrophy is noted.   2.  Mild bilateral ventricular dilatation with ventriculostomy tube in place, may represent ex vacuo changes, stable since prior study   3.  Atherosclerosis.                   COURSE & MEDICAL DECISION MAKING    ASSESSMENT, COURSE AND PLAN  Care Narrative: Patient presenting with recurrent fall.  Will repeat trauma workup to include CT head.  Will get labs given recurrence of fall to evaluate for possible underlying tox metabolic etiologies    DISPOSITION AND DISCUSSIONS  I have discussed management of the patient with the following physicians and CARMELO's: Intensivist and hospitalist; patient to be admitted to the IMCU    54-year-old female presented the emerged part with above presentation.  Fortunately CT imaging does not show any acute traumatic findings however her labs do show new profound hyponatremia.  For this reason the patient will be placed in IMCU.  Please see above consults.    FINAL DIAGNOSIS  1. Fall, initial encounter    2. Hyponatremia         Electronically signed by:  Silvio Dillard M.D., 2/18/2025 2:05 AM

## 2025-02-19 LAB
ALBUMIN SERPL BCP-MCNC: 3.9 G/DL (ref 3.2–4.9)
ALBUMIN/GLOB SERPL: 2.1 G/DL
ALP SERPL-CCNC: 79 U/L (ref 30–99)
ALT SERPL-CCNC: 38 U/L (ref 2–50)
ANION GAP SERPL CALC-SCNC: 11 MMOL/L (ref 7–16)
ANION GAP SERPL CALC-SCNC: 9 MMOL/L (ref 7–16)
AST SERPL-CCNC: 10 U/L (ref 12–45)
BILIRUB SERPL-MCNC: 0.2 MG/DL (ref 0.1–1.5)
BUN SERPL-MCNC: 8 MG/DL (ref 8–22)
BUN SERPL-MCNC: 8 MG/DL (ref 8–22)
CALCIUM ALBUM COR SERPL-MCNC: 8.8 MG/DL (ref 8.5–10.5)
CALCIUM SERPL-MCNC: 8.7 MG/DL (ref 8.5–10.5)
CALCIUM SERPL-MCNC: 8.8 MG/DL (ref 8.5–10.5)
CHLORIDE SERPL-SCNC: 100 MMOL/L (ref 96–112)
CHLORIDE SERPL-SCNC: 101 MMOL/L (ref 96–112)
CHOLEST SERPL-MCNC: 167 MG/DL (ref 100–199)
CO2 SERPL-SCNC: 16 MMOL/L (ref 20–33)
CO2 SERPL-SCNC: 20 MMOL/L (ref 20–33)
CREAT SERPL-MCNC: 0.48 MG/DL (ref 0.5–1.4)
CREAT SERPL-MCNC: 0.53 MG/DL (ref 0.5–1.4)
ERYTHROCYTE [DISTWIDTH] IN BLOOD BY AUTOMATED COUNT: 49 FL (ref 35.9–50)
ERYTHROCYTE [DISTWIDTH] IN BLOOD BY AUTOMATED COUNT: 50 FL (ref 35.9–50)
GFR SERPLBLD CREATININE-BSD FMLA CKD-EPI: 111 ML/MIN/1.73 M 2
GFR SERPLBLD CREATININE-BSD FMLA CKD-EPI: 114 ML/MIN/1.73 M 2
GLOBULIN SER CALC-MCNC: 1.9 G/DL (ref 1.9–3.5)
GLUCOSE SERPL-MCNC: 86 MG/DL (ref 65–99)
GLUCOSE SERPL-MCNC: 94 MG/DL (ref 65–99)
HCT VFR BLD AUTO: 34 % (ref 37–47)
HCT VFR BLD AUTO: 35.1 % (ref 37–47)
HDLC SERPL-MCNC: 62 MG/DL
HGB BLD-MCNC: 11.7 G/DL (ref 12–16)
HGB BLD-MCNC: 12 G/DL (ref 12–16)
LDLC SERPL CALC-MCNC: 92 MG/DL
MCH RBC QN AUTO: 31.2 PG (ref 27–33)
MCH RBC QN AUTO: 31.5 PG (ref 27–33)
MCHC RBC AUTO-ENTMCNC: 34.2 G/DL (ref 32.2–35.5)
MCHC RBC AUTO-ENTMCNC: 34.4 G/DL (ref 32.2–35.5)
MCV RBC AUTO: 91.2 FL (ref 81.4–97.8)
MCV RBC AUTO: 91.6 FL (ref 81.4–97.8)
PLATELET # BLD AUTO: 262 K/UL (ref 164–446)
PLATELET # BLD AUTO: 263 K/UL (ref 164–446)
PMV BLD AUTO: 8.7 FL (ref 9–12.9)
PMV BLD AUTO: 8.8 FL (ref 9–12.9)
POTASSIUM SERPL-SCNC: 4.2 MMOL/L (ref 3.6–5.5)
POTASSIUM SERPL-SCNC: 4.3 MMOL/L (ref 3.6–5.5)
PROT SERPL-MCNC: 5.8 G/DL (ref 6–8.2)
RBC # BLD AUTO: 3.71 M/UL (ref 4.2–5.4)
RBC # BLD AUTO: 3.85 M/UL (ref 4.2–5.4)
SODIUM SERPL-SCNC: 125 MMOL/L (ref 135–145)
SODIUM SERPL-SCNC: 127 MMOL/L (ref 135–145)
SODIUM SERPL-SCNC: 130 MMOL/L (ref 135–145)
SODIUM SERPL-SCNC: 131 MMOL/L (ref 135–145)
SODIUM SERPL-SCNC: 134 MMOL/L (ref 135–145)
TRIGL SERPL-MCNC: 67 MG/DL (ref 0–149)
WBC # BLD AUTO: 4.1 K/UL (ref 4.8–10.8)
WBC # BLD AUTO: 5 K/UL (ref 4.8–10.8)

## 2025-02-19 PROCEDURE — 84295 ASSAY OF SERUM SODIUM: CPT | Mod: 91

## 2025-02-19 PROCEDURE — 85027 COMPLETE CBC AUTOMATED: CPT | Mod: 91

## 2025-02-19 PROCEDURE — 90471 IMMUNIZATION ADMIN: CPT

## 2025-02-19 PROCEDURE — A9270 NON-COVERED ITEM OR SERVICE: HCPCS | Performed by: STUDENT IN AN ORGANIZED HEALTH CARE EDUCATION/TRAINING PROGRAM

## 2025-02-19 PROCEDURE — 80048 BASIC METABOLIC PNL TOTAL CA: CPT

## 2025-02-19 PROCEDURE — 36415 COLL VENOUS BLD VENIPUNCTURE: CPT

## 2025-02-19 PROCEDURE — A9270 NON-COVERED ITEM OR SERVICE: HCPCS | Performed by: HOSPITALIST

## 2025-02-19 PROCEDURE — 90656 IIV3 VACC NO PRSV 0.5 ML IM: CPT | Performed by: HOSPITALIST

## 2025-02-19 PROCEDURE — 80061 LIPID PANEL: CPT

## 2025-02-19 PROCEDURE — 700111 HCHG RX REV CODE 636 W/ 250 OVERRIDE (IP): Mod: JZ | Performed by: HOSPITALIST

## 2025-02-19 PROCEDURE — 770020 HCHG ROOM/CARE - TELE (206)

## 2025-02-19 PROCEDURE — 3E02340 INTRODUCTION OF INFLUENZA VACCINE INTO MUSCLE, PERCUTANEOUS APPROACH: ICD-10-PCS | Performed by: HOSPITALIST

## 2025-02-19 PROCEDURE — 700102 HCHG RX REV CODE 250 W/ 637 OVERRIDE(OP): Performed by: STUDENT IN AN ORGANIZED HEALTH CARE EDUCATION/TRAINING PROGRAM

## 2025-02-19 PROCEDURE — 700105 HCHG RX REV CODE 258: Performed by: STUDENT IN AN ORGANIZED HEALTH CARE EDUCATION/TRAINING PROGRAM

## 2025-02-19 PROCEDURE — 80053 COMPREHEN METABOLIC PANEL: CPT

## 2025-02-19 PROCEDURE — 700102 HCHG RX REV CODE 250 W/ 637 OVERRIDE(OP): Performed by: HOSPITALIST

## 2025-02-19 PROCEDURE — 700105 HCHG RX REV CODE 258: Performed by: HOSPITALIST

## 2025-02-19 RX ORDER — DIPHENHYDRAMINE HYDROCHLORIDE 50 MG/ML
50 INJECTION INTRAMUSCULAR; INTRAVENOUS EVERY 6 HOURS PRN
Status: DISCONTINUED | OUTPATIENT
Start: 2025-02-19 | End: 2025-02-20 | Stop reason: HOSPADM

## 2025-02-19 RX ADMIN — OXYCODONE 5 MG: 5 TABLET ORAL at 17:08

## 2025-02-19 RX ADMIN — INFLUENZA A VIRUS A/VICTORIA/4897/2022 IVR-238 (H1N1) ANTIGEN (FORMALDEHYDE INACTIVATED), INFLUENZA A VIRUS A/CALIFORNIA/122/2022 SAN-022 (H3N2) ANTIGEN (FORMALDEHYDE INACTIVATED), AND INFLUENZA B VIRUS B/MICHIGAN/01/2021 ANTIGEN (FORMALDEHYDE INACTIVATED) 0.5 ML: 15; 15; 15 INJECTION, SUSPENSION INTRAMUSCULAR at 20:31

## 2025-02-19 RX ADMIN — DONEPEZIL HYDROCHLORIDE 5 MG: 5 TABLET, FILM COATED ORAL at 20:25

## 2025-02-19 RX ADMIN — OXYCODONE 5 MG: 5 TABLET ORAL at 22:36

## 2025-02-19 RX ADMIN — DIPHENHYDRAMINE HYDROCHLORIDE 50 MG: 50 INJECTION, SOLUTION INTRAMUSCULAR; INTRAVENOUS at 08:01

## 2025-02-19 RX ADMIN — OXYCODONE 5 MG: 5 TABLET ORAL at 13:00

## 2025-02-19 RX ADMIN — KETOROLAC TROMETHAMINE 15 MG: 15 INJECTION, SOLUTION INTRAMUSCULAR; INTRAVENOUS at 20:25

## 2025-02-19 RX ADMIN — SODIUM CHLORIDE: 9 INJECTION, SOLUTION INTRAVENOUS at 15:31

## 2025-02-19 RX ADMIN — KETOROLAC TROMETHAMINE 15 MG: 15 INJECTION, SOLUTION INTRAMUSCULAR; INTRAVENOUS at 14:17

## 2025-02-19 RX ADMIN — ZONISAMIDE 300 MG: 100 CAPSULE ORAL at 16:24

## 2025-02-19 RX ADMIN — ACETAMINOPHEN 650 MG: 325 TABLET ORAL at 15:24

## 2025-02-19 RX ADMIN — RISPERIDONE 3 MG: 1 TABLET, FILM COATED ORAL at 16:23

## 2025-02-19 RX ADMIN — DIVALPROEX SODIUM 500 MG: 500 TABLET, EXTENDED RELEASE ORAL at 16:25

## 2025-02-19 RX ADMIN — ASPIRIN 81 MG: 81 TABLET, COATED ORAL at 05:19

## 2025-02-19 RX ADMIN — TRAZODONE HYDROCHLORIDE 100 MG: 50 TABLET ORAL at 20:25

## 2025-02-19 RX ADMIN — SODIUM CHLORIDE: 9 INJECTION, SOLUTION INTRAVENOUS at 05:21

## 2025-02-19 RX ADMIN — KETOROLAC TROMETHAMINE 15 MG: 15 INJECTION, SOLUTION INTRAMUSCULAR; INTRAVENOUS at 08:01

## 2025-02-19 RX ADMIN — DIPHENHYDRAMINE HYDROCHLORIDE 50 MG: 50 INJECTION, SOLUTION INTRAMUSCULAR; INTRAVENOUS at 14:19

## 2025-02-19 RX ADMIN — DIPHENHYDRAMINE HYDROCHLORIDE 25 MG: 50 INJECTION, SOLUTION INTRAMUSCULAR; INTRAVENOUS at 00:54

## 2025-02-19 RX ADMIN — ATORVASTATIN CALCIUM 40 MG: 40 TABLET, FILM COATED ORAL at 16:25

## 2025-02-19 RX ADMIN — DIPHENHYDRAMINE HYDROCHLORIDE 50 MG: 50 INJECTION, SOLUTION INTRAMUSCULAR; INTRAVENOUS at 20:26

## 2025-02-19 RX ADMIN — LACOSAMIDE 200 MG: 100 TABLET, FILM COATED ORAL at 16:24

## 2025-02-19 RX ADMIN — DIVALPROEX SODIUM 500 MG: 500 TABLET, EXTENDED RELEASE ORAL at 05:19

## 2025-02-19 RX ADMIN — LACOSAMIDE 200 MG: 100 TABLET, FILM COATED ORAL at 05:19

## 2025-02-19 RX ADMIN — KETOROLAC TROMETHAMINE 15 MG: 15 INJECTION, SOLUTION INTRAMUSCULAR; INTRAVENOUS at 00:54

## 2025-02-19 SDOH — ECONOMIC STABILITY: TRANSPORTATION INSECURITY
IN THE PAST 12 MONTHS, HAS LACK OF RELIABLE TRANSPORTATION KEPT YOU FROM MEDICAL APPOINTMENTS, MEETINGS, WORK OR FROM GETTING THINGS NEEDED FOR DAILY LIVING?: YES

## 2025-02-19 ASSESSMENT — ENCOUNTER SYMPTOMS
HEADACHES: 1
SENSORY CHANGE: 0
FOCAL WEAKNESS: 0
NAUSEA: 0
FEVER: 0
SPEECH CHANGE: 0
WEAKNESS: 1
ABDOMINAL PAIN: 0
SHORTNESS OF BREATH: 0
VOMITING: 0
DOUBLE VISION: 0
BLURRED VISION: 1
MYALGIAS: 0
CHILLS: 0
DEPRESSION: 0
DIZZINESS: 1
COUGH: 0
HEARTBURN: 0
BRUISES/BLEEDS EASILY: 0
PALPITATIONS: 0

## 2025-02-19 ASSESSMENT — LIFESTYLE VARIABLES
ON A TYPICAL DAY WHEN YOU DRINK ALCOHOL HOW MANY DRINKS DO YOU HAVE: 0
EVER HAD A DRINK FIRST THING IN THE MORNING TO STEADY YOUR NERVES TO GET RID OF A HANGOVER: NO
EVER FELT BAD OR GUILTY ABOUT YOUR DRINKING: NO
CONSUMPTION TOTAL: NEGATIVE
TOTAL SCORE: 0
HAVE YOU EVER FELT YOU SHOULD CUT DOWN ON YOUR DRINKING: NO
ALCOHOL_USE: NO
AVERAGE NUMBER OF DAYS PER WEEK YOU HAVE A DRINK CONTAINING ALCOHOL: 0
EVER FELT BAD OR GUILTY ABOUT YOUR DRINKING: NO
CONSUMPTION TOTAL: INCOMPLETE
TOTAL SCORE: 0
ALCOHOL_USE: NO
TOTAL SCORE: 0
HAVE PEOPLE ANNOYED YOU BY CRITICIZING YOUR DRINKING: NO
EVER HAD A DRINK FIRST THING IN THE MORNING TO STEADY YOUR NERVES TO GET RID OF A HANGOVER: NO
TOTAL SCORE: 0
HAVE PEOPLE ANNOYED YOU BY CRITICIZING YOUR DRINKING: NO
TOTAL SCORE: 0
SUBSTANCE_ABUSE: 0
DOES PATIENT WANT TO STOP DRINKING: NO
HOW MANY TIMES IN THE PAST YEAR HAVE YOU HAD 5 OR MORE DRINKS IN A DAY: 0
TOTAL SCORE: 0
ON A TYPICAL DAY WHEN YOU DRINK ALCOHOL HOW MANY DRINKS DO YOU HAVE: 0
HAVE YOU EVER FELT YOU SHOULD CUT DOWN ON YOUR DRINKING: NO

## 2025-02-19 ASSESSMENT — COGNITIVE AND FUNCTIONAL STATUS - GENERAL
DRESSING REGULAR UPPER BODY CLOTHING: A LITTLE
MOVING TO AND FROM BED TO CHAIR: A LITTLE
STANDING UP FROM CHAIR USING ARMS: A LITTLE
SUGGESTED CMS G CODE MODIFIER MOBILITY: CJ
EATING MEALS: A LITTLE
SUGGESTED CMS G CODE MODIFIER DAILY ACTIVITY: CJ
MOVING FROM LYING ON BACK TO SITTING ON SIDE OF FLAT BED: A LITTLE
MOBILITY SCORE: 21
DAILY ACTIVITIY SCORE: 22

## 2025-02-19 ASSESSMENT — SOCIAL DETERMINANTS OF HEALTH (SDOH)
WITHIN THE PAST 12 MONTHS, THE FOOD YOU BOUGHT JUST DIDN'T LAST AND YOU DIDN'T HAVE MONEY TO GET MORE: NEVER TRUE
WITHIN THE LAST YEAR, HAVE YOU BEEN HUMILIATED OR EMOTIONALLY ABUSED IN OTHER WAYS BY YOUR PARTNER OR EX-PARTNER?: NO
WITHIN THE LAST YEAR, HAVE YOU BEEN AFRAID OF YOUR PARTNER OR EX-PARTNER?: NO
WITHIN THE PAST 12 MONTHS, YOU WORRIED THAT YOUR FOOD WOULD RUN OUT BEFORE YOU GOT THE MONEY TO BUY MORE: NEVER TRUE
WITHIN THE LAST YEAR, HAVE TO BEEN RAPED OR FORCED TO HAVE ANY KIND OF SEXUAL ACTIVITY BY YOUR PARTNER OR EX-PARTNER?: NO
IN THE PAST 12 MONTHS, HAS THE ELECTRIC, GAS, OIL, OR WATER COMPANY THREATENED TO SHUT OFF SERVICE IN YOUR HOME?: NO
WITHIN THE LAST YEAR, HAVE YOU BEEN KICKED, HIT, SLAPPED, OR OTHERWISE PHYSICALLY HURT BY YOUR PARTNER OR EX-PARTNER?: NO

## 2025-02-19 ASSESSMENT — PAIN DESCRIPTION - PAIN TYPE
TYPE: ACUTE PAIN

## 2025-02-19 ASSESSMENT — PATIENT HEALTH QUESTIONNAIRE - PHQ9
1. LITTLE INTEREST OR PLEASURE IN DOING THINGS: NOT AT ALL
SUM OF ALL RESPONSES TO PHQ9 QUESTIONS 1 AND 2: 0
2. FEELING DOWN, DEPRESSED, IRRITABLE, OR HOPELESS: NOT AT ALL

## 2025-02-19 ASSESSMENT — FIBROSIS 4 INDEX: FIB4 SCORE: 0.32

## 2025-02-19 NOTE — DISCHARGE PLANNING
[facility] Transitional Care Coordination    Physiatry consult pended, waiting for additional information.  Would welcome PT/OT as clinically appropriate.  TCC following.  Please reach out sooner if PMR consult requested for medical management.

## 2025-02-19 NOTE — PROGRESS NOTES
4 Eyes Skin Assessment Completed by CAROLINA Lauren and CAROLINA Nazario.    Head Scab, Blanching, Bruising, and Edema  Ears WDL  Nose WDL  Mouth WDL  Neck WDL  Breast/Chest Abrasion  Shoulder Blades WDL  Spine WDL  (R) Arm/Elbow/Hand Redness and Blanching  (L) Arm/Elbow/Hand Redness and Blanching  Abdomen WDL  Groin WDL  Scrotum/Coccyx/Buttocks Redness and Blanching  (R) Leg WDL  (L) Leg WDL  (R) Heel/Foot/Toe Redness and Blanching  (L) Heel/Foot/Toe Redness and Blanching          Devices In Places ECG, Tele Box, Blood Pressure Cuff, and Pulse Ox      Interventions In Place NC W/Ear Foams    Possible Skin Injury Yes    Pictures Uploaded Into Epic Yes  Wound Consult Placed N/A  RN Wound Prevention Protocol Ordered Yes

## 2025-02-19 NOTE — PROGRESS NOTES
Hospital Medicine Daily Progress Note    Date of Service  2/19/2025    Chief Complaint  Dizziness, ground level fall    Hospital Course  Enedelia Pang is a 54 y.o. female w/ hx of pseudotumor cerebri s/p  shunt, chronic migraine headaches, frequent falls.  She was admitted 2/18/2025 with 4 falls since December 2024 and dizziness.  In the ER she was found to have a Na:114.  She was admit to IMCU and started on NS with slow and appropriate improvement of her Na levels.    Interval Problem Update  2/19: Na:125, Denies excessive drinking of fluids, states she has been eating okay.  No excessive urination or sweating.  Still some blurred vision and dizziness. Has headache.    I have discussed this patient's plan of care and discharge plan at IDT rounds today with Case Management, Nursing, Nursing leadership, and other members of the IDT team.    Consultants/Specialty  None    Code Status  Full Code    Disposition  The patient is not medically cleared for discharge to home or a post-acute facility.      I have placed the appropriate orders for post-discharge needs.    Review of Systems  Review of Systems   Constitutional:  Positive for malaise/fatigue. Negative for chills and fever.   HENT:  Negative for hearing loss and tinnitus.    Eyes:  Positive for blurred vision. Negative for double vision.   Respiratory:  Negative for cough and shortness of breath.    Cardiovascular:  Negative for chest pain and palpitations.   Gastrointestinal:  Negative for abdominal pain, heartburn, nausea and vomiting.   Genitourinary:  Negative for dysuria and urgency.   Musculoskeletal:  Negative for joint pain and myalgias.   Skin:  Negative for itching and rash.   Neurological:  Positive for dizziness, weakness and headaches. Negative for sensory change, speech change and focal weakness.   Endo/Heme/Allergies:  Negative for environmental allergies. Does not bruise/bleed easily.   Psychiatric/Behavioral:  Negative for depression and  substance abuse.    All other systems reviewed and are negative.       Physical Exam  Temp:  [36.3 °C (97.3 °F)-36.9 °C (98.5 °F)] 36.9 °C (98.5 °F)  Pulse:  [72-92] 73  Resp:  [11-76] 49  BP: ()/(44-78) 120/65  SpO2:  [90 %-99 %] 95 %    Physical Exam  Vitals and nursing note reviewed.   Constitutional:       Comments: In fetal position covered in blankets, weakly in appearance.   HENT:      Head: Normocephalic and atraumatic.      Comments: Superficial laceration, bruising in face     Nose: Nose normal.      Mouth/Throat:      Mouth: Mucous membranes are dry.      Pharynx: Oropharynx is clear.   Eyes:      General: No scleral icterus.     Extraocular Movements: Extraocular movements intact.   Cardiovascular:      Rate and Rhythm: Normal rate and regular rhythm.      Pulses: Normal pulses.      Heart sounds:      No friction rub.   Pulmonary:      Effort: No respiratory distress.      Breath sounds: No wheezing or rales.   Chest:      Chest wall: No tenderness.   Abdominal:      General: There is no distension.      Tenderness: There is no abdominal tenderness. There is no guarding or rebound.   Musculoskeletal:         General: Normal range of motion.      Cervical back: Neck supple. No tenderness.      Right lower leg: No edema.      Left lower leg: No edema.   Skin:     General: Skin is warm.      Capillary Refill: Capillary refill takes less than 2 seconds.   Neurological:      General: No focal deficit present.      Mental Status: She is alert and oriented to person, place, and time.      Cranial Nerves: No cranial nerve deficit.   Psychiatric:         Mood and Affect: Mood normal.         Fluids    Intake/Output Summary (Last 24 hours) at 2/19/2025 0719  Last data filed at 2/18/2025 2100  Gross per 24 hour   Intake --   Output 1300 ml   Net -1300 ml        Laboratory  Recent Labs     02/18/25  0232 02/19/25  0329   WBC 7.6 4.1*   RBC 4.33 3.85*   HEMOGLOBIN 13.7 12.0   HEMATOCRIT 37.6 35.1*   MCV 86.8  91.2   MCH 31.6 31.2   MCHC 36.4* 34.2   RDW 45.1 49.0   PLATELETCT 281 263   MPV 8.6* 8.8*     Recent Labs     02/18/25  0232 02/18/25  0556 02/18/25  2305 02/19/25  0329 02/19/25  0612   SODIUM 114*   < > 124* 127* 125*   POTASSIUM 3.5*  --   --  4.2  --    CHLORIDE 83*  --   --  100  --    CO2 20  --   --  16*  --    GLUCOSE 90  --   --  94  --    BUN 8  --   --  8  --    CREATININE 0.57  --   --  0.48*  --    CALCIUM 8.6  --   --  8.8  --     < > = values in this interval not displayed.     Recent Labs     02/18/25 0232   INR 0.92         Recent Labs     02/19/25 0329   TRIGLYCERIDE 67   HDL 62   LDL 92       Imaging  IR-MIDLINE CATHETER INSERTION WO GUIDANCE > AGE 3   Final Result                  Ultrasound-guided midline placement performed by qualified nursing staff    as above.          US-RUQ   Final Result         1.  Hepatomegaly   2.  Echogenic liver, compatible with fatty change versus   3.  Common bile duct dilatation, commonly associated with postcholecystectomy physiology, consider causes of biliary obstruction with additional workup as clinically appropriate.      DX-CHEST-LIMITED (1 VIEW)   Final Result         1.  No acute cardiopulmonary disease.   2.  Trace right pleural effusion      CT-HEAD W/O   Final Result         1.  No acute intracranial abnormality is identified, there are nonspecific white matter changes, commonly associated with small vessel ischemic disease.  Associated mild cerebral atrophy is noted.   2.  Mild bilateral ventricular dilatation with ventriculostomy tube in place, may represent ex vacuo changes, stable since prior study   3.  Atherosclerosis.               MR-BRAIN-W/O    (Results Pending)   EC-ECHOCARDIOGRAM COMPLETE W/O CONT    (Results Pending)        Assessment/Plan  * Acute hyponatremia- (present on admission)  Assessment & Plan  2/19:  Na:125 <127  Increase NS from 50cc to 75cc  Monitor I/O's, BMP if stable can change to tid.  If no improving Na level will add  NaCl tabs      Ataxia  Assessment & Plan  2/19:  Ataxia, weakness, frequent falls  No acute findings on CT head  Rule out acute CVA-check MRI brain if continues to have dizziness  Question if other process causing a possible SIADH issue?    ASA/statin for now  PT/OT/ST    Hypophosphatemia  Assessment & Plan  Replace as appropriate    Spinal cord stimulator status- (present on admission)  Assessment & Plan  Per history    Transaminitis- (present on admission)  Assessment & Plan  Check ETOH  Check RUQ US    Substance dependence (HCC)- (present on admission)  Assessment & Plan  2/19:   Watch for narcotic abuse.  Prior history  Denies current use  Check UDS    Seizures (HCC)- (present on admission)  Assessment & Plan  Continue home seizure meds --zonisamide, Vimpat, Depakote  Seizure precautions, aspiration precautions given profound hyponatremia    Hyperlipidemia- (present on admission)  Assessment & Plan  Statin    Hypokalemia- (present on admission)  Assessment & Plan  2/19:  Replace as appropriate  Follow up on BMP    Mood disorder (HCC)- (present on admission)  Assessment & Plan  On SSRI  --hold all for now given concerning hyponatremia         VTE prophylaxis:   SCDs/TEDs      I have performed a physical exam and reviewed and updated ROS and Plan today (2/19/2025). In review of yesterday's note (2/18/2025), there are no changes except as documented above.

## 2025-02-19 NOTE — PROGRESS NOTES
4 Eyes Skin Assessment Completed by CAROLINA Vinson and CAROLINA Sorto.    Head Bruising, Swelling, and Redness, laceration on forehead  Ears Redness and Blanching  Nose WDL  Mouth WDL  Neck WDL  Breast/Chest Redness and Blanching  Shoulder Blades Redness and Blanching, scab  Spine WDL  (R) Arm/Elbow/Hand WDL  (L) Arm/Elbow/Hand WDL  Abdomen WDL  Groin WDL  Scrotum/Coccyx/Buttocks WDL  (R) Leg WDL  (L) Leg WDL  (R) Heel/Foot/Toe Redness and Blanching  (L) Heel/Foot/Toe Redness and Blanching          Devices In Places Tele Box and Pulse Ox      Interventions In Place Pillows    Possible Skin Injury No    Pictures Uploaded Into Epic N/A  Wound Consult Placed N/A  RN Wound Prevention Protocol Ordered No

## 2025-02-19 NOTE — CARE PLAN
The patient is Watcher - Medium risk of patient condition declining or worsening    Shift Goals  Clinical Goals: q4h NA, pain management  Patient Goals: pain management  Family Goals: LUCIA    Progress made toward(s) clinical / shift goals:        Problem: Pain - Standard  Goal: Alleviation of pain or a reduction in pain to the patient’s comfort goal  Outcome: Progressing     Problem: Psychosocial - Patient Condition  Goal: Patient's ability to verbalize feelings about condition will improve  Outcome: Progressing     Problem: Hemodynamics  Goal: Patient's hemodynamics, fluid balance and neurologic status will be stable or improve  Outcome: Progressing       Patient is not progressing towards the following goals:

## 2025-02-19 NOTE — PROGRESS NOTES
Admitted this am with symptomatic hyponatremia.  I been monitoring follow-up sodium levels and sodium appears to be improving appropriately.  The echocardiogram is still pending.  Physical and occupational therapies to evaluate patient.  Patient is alert and oriented with clear fluent speech.  Dizziness has improved.

## 2025-02-19 NOTE — CARE PLAN
The patient is Watcher - Medium risk of patient condition declining or worsening    Shift Goals  Clinical Goals: Na q4, neuro q4, pain management  Patient Goals: pain control  Family Goals: LUCIA    Progress made toward(s) clinical / shift goals:    Problem: Pain - Standard  Goal: Alleviation of pain or a reduction in pain to the patient’s comfort goal  Outcome: Progressing     Problem: Optimal Care of the Stroke Patient  Goal: Optimal emergency care for the stroke patient  Outcome: Progressing  Goal: Optimal acute care for the stroke patient  Outcome: Progressing     Problem: Knowledge Deficit - Stroke Education  Goal: Patient's knowledge of stroke and risk factors will improve  Outcome: Progressing     Problem: Psychosocial - Patient Condition  Goal: Patient's ability to verbalize feelings about condition will improve  Outcome: Progressing  Goal: Patient's ability to re-evaluate and adapt role responsibilities will improve  Outcome: Progressing     Problem: Discharge Planning - Stroke  Goal: Ensure Stroke Core Measures are met prior to discharge  Outcome: Progressing  Goal: Patient’s continuum of care needs will be met  Outcome: Progressing     Problem: Neuro Status  Goal: Neuro status will remain stable or improve  Outcome: Progressing     Problem: Hemodynamic Monitoring  Goal: Patient's hemodynamics, fluid balance and neurologic status will be stable or improve  Outcome: Progressing     Problem: Respiratory - Stroke Patient  Goal: Patient will achieve/maintain optimum respiratory rate/effort  Outcome: Progressing     Problem: Dysphagia  Goal: Dysphagia will improve  Outcome: Progressing     Problem: Risk for Aspiration  Goal: Patient's risk for aspiration will be absent or decrease  Outcome: Progressing     Problem: Urinary Elimination  Goal: Establish and maintain regular urinary output  Outcome: Progressing     Problem: Bowel Elimination  Goal: Establish and maintain regular bowel function  Outcome:  Progressing     Problem: Self Care  Goal: Patient will have the ability to perform ADLs independently or with assistance (bathe, groom, dress, toilet and feed)  Outcome: Progressing     Problem: Knowledge Deficit - Standard  Goal: Patient and family/care givers will demonstrate understanding of plan of care, disease process/condition, diagnostic tests and medications  Outcome: Progressing     Problem: Hemodynamics  Goal: Patient's hemodynamics, fluid balance and neurologic status will be stable or improve  Outcome: Progressing     Problem: Fluid Volume  Goal: Fluid volume balance will be maintained  Outcome: Progressing     Problem: Urinary - Renal Perfusion  Goal: Ability to achieve and maintain adequate renal perfusion and functioning will improve  Outcome: Progressing     Problem: Respiratory  Goal: Patient will achieve/maintain optimum respiratory ventilation and gas exchange  Outcome: Progressing     Problem: Physical Regulation  Goal: Diagnostic test results will improve  Outcome: Progressing  Goal: Signs and symptoms of infection will decrease  Outcome: Progressing     Problem: Fall Risk  Goal: Patient will remain free from falls  Outcome: Progressing       Patient is not progressing towards the following goals:

## 2025-02-20 ENCOUNTER — APPOINTMENT (OUTPATIENT)
Dept: CARDIOLOGY | Facility: MEDICAL CENTER | Age: 55
DRG: 641 | End: 2025-02-20
Attending: STUDENT IN AN ORGANIZED HEALTH CARE EDUCATION/TRAINING PROGRAM
Payer: MEDICARE

## 2025-02-20 VITALS
WEIGHT: 141.31 LBS | HEART RATE: 85 BPM | TEMPERATURE: 98.4 F | SYSTOLIC BLOOD PRESSURE: 122 MMHG | RESPIRATION RATE: 16 BRPM | OXYGEN SATURATION: 96 % | HEIGHT: 63 IN | DIASTOLIC BLOOD PRESSURE: 51 MMHG | BODY MASS INDEX: 25.04 KG/M2

## 2025-02-20 LAB
ANION GAP SERPL CALC-SCNC: 10 MMOL/L (ref 7–16)
BUN SERPL-MCNC: 10 MG/DL (ref 8–22)
CALCIUM SERPL-MCNC: 8.8 MG/DL (ref 8.5–10.5)
CHLORIDE SERPL-SCNC: 101 MMOL/L (ref 96–112)
CO2 SERPL-SCNC: 21 MMOL/L (ref 20–33)
CREAT SERPL-MCNC: 0.62 MG/DL (ref 0.5–1.4)
ERYTHROCYTE [DISTWIDTH] IN BLOOD BY AUTOMATED COUNT: 51.1 FL (ref 35.9–50)
GFR SERPLBLD CREATININE-BSD FMLA CKD-EPI: 107 ML/MIN/1.73 M 2
GLUCOSE SERPL-MCNC: 92 MG/DL (ref 65–99)
HCT VFR BLD AUTO: 34.4 % (ref 37–47)
HGB BLD-MCNC: 11.8 G/DL (ref 12–16)
LV EJECT FRACT MOD 2C 99903: 58.75
LV EJECT FRACT MOD 4C 99902: 61.14
LV EJECT FRACT MOD BP 99901: 61.01
MCH RBC QN AUTO: 31.6 PG (ref 27–33)
MCHC RBC AUTO-ENTMCNC: 34.3 G/DL (ref 32.2–35.5)
MCV RBC AUTO: 92.2 FL (ref 81.4–97.8)
PLATELET # BLD AUTO: 299 K/UL (ref 164–446)
PMV BLD AUTO: 8.8 FL (ref 9–12.9)
POTASSIUM SERPL-SCNC: 4.4 MMOL/L (ref 3.6–5.5)
RBC # BLD AUTO: 3.73 M/UL (ref 4.2–5.4)
SODIUM SERPL-SCNC: 132 MMOL/L (ref 135–145)
SODIUM SERPL-SCNC: 132 MMOL/L (ref 135–145)
WBC # BLD AUTO: 5.7 K/UL (ref 4.8–10.8)

## 2025-02-20 PROCEDURE — 700102 HCHG RX REV CODE 250 W/ 637 OVERRIDE(OP): Performed by: STUDENT IN AN ORGANIZED HEALTH CARE EDUCATION/TRAINING PROGRAM

## 2025-02-20 PROCEDURE — 84295 ASSAY OF SERUM SODIUM: CPT

## 2025-02-20 PROCEDURE — 92526 ORAL FUNCTION THERAPY: CPT

## 2025-02-20 PROCEDURE — 92523 SPEECH SOUND LANG COMPREHEN: CPT

## 2025-02-20 PROCEDURE — 36415 COLL VENOUS BLD VENIPUNCTURE: CPT

## 2025-02-20 PROCEDURE — 700105 HCHG RX REV CODE 258: Performed by: STUDENT IN AN ORGANIZED HEALTH CARE EDUCATION/TRAINING PROGRAM

## 2025-02-20 PROCEDURE — 97162 PT EVAL MOD COMPLEX 30 MIN: CPT

## 2025-02-20 PROCEDURE — 93306 TTE W/DOPPLER COMPLETE: CPT

## 2025-02-20 PROCEDURE — 80048 BASIC METABOLIC PNL TOTAL CA: CPT

## 2025-02-20 PROCEDURE — A9270 NON-COVERED ITEM OR SERVICE: HCPCS | Performed by: HOSPITALIST

## 2025-02-20 PROCEDURE — A9270 NON-COVERED ITEM OR SERVICE: HCPCS | Performed by: STUDENT IN AN ORGANIZED HEALTH CARE EDUCATION/TRAINING PROGRAM

## 2025-02-20 PROCEDURE — 97535 SELF CARE MNGMENT TRAINING: CPT

## 2025-02-20 PROCEDURE — 93306 TTE W/DOPPLER COMPLETE: CPT | Mod: 26 | Performed by: STUDENT IN AN ORGANIZED HEALTH CARE EDUCATION/TRAINING PROGRAM

## 2025-02-20 PROCEDURE — 85027 COMPLETE CBC AUTOMATED: CPT

## 2025-02-20 PROCEDURE — 700111 HCHG RX REV CODE 636 W/ 250 OVERRIDE (IP): Mod: JZ | Performed by: HOSPITALIST

## 2025-02-20 PROCEDURE — 700102 HCHG RX REV CODE 250 W/ 637 OVERRIDE(OP): Performed by: HOSPITALIST

## 2025-02-20 RX ORDER — SODIUM CHLORIDE, SODIUM LACTATE, POTASSIUM CHLORIDE, AND CALCIUM CHLORIDE .6; .31; .03; .02 G/100ML; G/100ML; G/100ML; G/100ML
1000 INJECTION, SOLUTION INTRAVENOUS ONCE
Status: COMPLETED | OUTPATIENT
Start: 2025-02-20 | End: 2025-02-20

## 2025-02-20 RX ADMIN — KETOROLAC TROMETHAMINE 15 MG: 15 INJECTION, SOLUTION INTRAMUSCULAR; INTRAVENOUS at 03:09

## 2025-02-20 RX ADMIN — ASPIRIN 81 MG: 81 TABLET, COATED ORAL at 05:14

## 2025-02-20 RX ADMIN — DIVALPROEX SODIUM 500 MG: 500 TABLET, EXTENDED RELEASE ORAL at 05:14

## 2025-02-20 RX ADMIN — SODIUM CHLORIDE, POTASSIUM CHLORIDE, SODIUM LACTATE AND CALCIUM CHLORIDE 1000 ML: 600; 310; 30; 20 INJECTION, SOLUTION INTRAVENOUS at 13:15

## 2025-02-20 RX ADMIN — OXYCODONE 5 MG: 5 TABLET ORAL at 05:14

## 2025-02-20 RX ADMIN — KETOROLAC TROMETHAMINE 15 MG: 15 INJECTION, SOLUTION INTRAMUSCULAR; INTRAVENOUS at 10:11

## 2025-02-20 RX ADMIN — KETOROLAC TROMETHAMINE 15 MG: 15 INJECTION, SOLUTION INTRAMUSCULAR; INTRAVENOUS at 16:05

## 2025-02-20 RX ADMIN — LACOSAMIDE 200 MG: 100 TABLET, FILM COATED ORAL at 05:14

## 2025-02-20 RX ADMIN — RISPERIDONE 3 MG: 1 TABLET, FILM COATED ORAL at 05:14

## 2025-02-20 RX ADMIN — DIPHENHYDRAMINE HYDROCHLORIDE 50 MG: 50 INJECTION, SOLUTION INTRAMUSCULAR; INTRAVENOUS at 03:09

## 2025-02-20 RX ADMIN — DIPHENHYDRAMINE HYDROCHLORIDE 50 MG: 50 INJECTION, SOLUTION INTRAMUSCULAR; INTRAVENOUS at 16:06

## 2025-02-20 RX ADMIN — DIPHENHYDRAMINE HYDROCHLORIDE 50 MG: 50 INJECTION, SOLUTION INTRAMUSCULAR; INTRAVENOUS at 10:11

## 2025-02-20 ASSESSMENT — COGNITIVE AND FUNCTIONAL STATUS - GENERAL
SUGGESTED CMS G CODE MODIFIER MOBILITY: CJ
WALKING IN HOSPITAL ROOM: A LITTLE
CLIMB 3 TO 5 STEPS WITH RAILING: A LITTLE
MOBILITY SCORE: 22

## 2025-02-20 ASSESSMENT — GAIT ASSESSMENTS
GAIT LEVEL OF ASSIST: SUPERVISED
ASSISTIVE DEVICE: FRONT WHEEL WALKER
DISTANCE (FEET): 150
DEVIATION: BRADYKINETIC

## 2025-02-20 ASSESSMENT — FIBROSIS 4 INDEX: FIB4 SCORE: 0.32

## 2025-02-20 ASSESSMENT — PAIN DESCRIPTION - PAIN TYPE
TYPE: ACUTE PAIN

## 2025-02-20 NOTE — THERAPY
"Speech Language Pathology   Cognitive Evaluation     Patient Name: Enedelia Pang  AGE:  54 y.o., SEX:  female  Medical Record #: [id number]  Date of Service: 2/20/2025      History of Present Illness  55 y/o F admit 2/18 after a GLF- pt also had a GLF 5 days ago. Pt being evaluated for dizziness and ataxia- an MRI is pending.      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     CXR 2/18: \"1.  No acute cardiopulmonary disease.  2.  Trace right pleural effusion\"     Head CT 2/18: \"1.  No acute intracranial abnormality is identified, there are nonspecific white matter changes, commonly associated with small vessel ischemic disease.  Associated mild cerebral atrophy is noted.  2.  Mild bilateral ventricular dilatation with ventriculostomy tube in place, may represent ex vacuo changes, stable since prior study  3.  Atherosclerosis\"     Remote hx of  services from 6/2021. Pt had a tracheostomy at that time and was on a mildly thick liquids, soft & bite-sized solids diet.       General Information  Vitals  O2 Delivery Device: None - Room Air  Level of Consciousness: Alert, Awake  Orientation: Oriented x 4  Follows Directives: Yes      Prior Living Situation & Level of Function  Housing / Facility: 1 Story Apartment / Condo  Lives with - Patient's Self Care Capacity: Spouse  Comments: Pt lives with her  who assists her with some iADLs.     Communication: WFL  Swallowing: Pt reports a few weeks of globus sensation with a few \"larger capsule pills\".           Subjective  RN cleared patient for evaluation. Patient received awake, agreeable to participate in evaluation.        Assessment  The patient was seen this date for a cognitive evaluation. " Portions of the COGNISTAT (Neurobehavioral Cognitive Status Assessment) were administered in conjunction with non-standardized assessments. Results are outlined below:        Cognistat  Orientation: Average  Attention: Average  Comprehension: Average  Repetition: Average  Naming: Average  Memory: Mild  Calculations: Average  Similarities: Average  Judgement: Average      Medication Management: Given a mock medication management task, patient was able to provide dosage and frequency independently.        Clinical Impressions  Based on formal and informal testing measures, patient presents with cognitive-linguistic skills that overall WFL and likely consistent with her baseline. Patient was alert and cooperative. Patient lives with her spouse who is able to provide assistance with IADLs at home. Acute cognitive intervention is not warranted. Recommend initial patient supervision upon returning home to ensure carryover of routine. If cognitive symptoms arise after resuming baseline activities, recommend outpatient speech therapy follow up.         NOTE: It is not within the scope of practice of Speech-Language Pathologists to determine patient capacity. Please defer to the physician or psych to complete this assessment.       Recommendations  Supervision Needs Upons Discharge: Intermittent assistance with IADLs (see below)  IADLs: Medication management, Financial management, Appointment management, Household chores, Cooking         SLP Treatment Plan  Treatment Plan: None Indicated  SLP Frequency: N/A - Evaluation Only  Estimated Duration: N/A - Evaluation Only      Anticipated Discharge Needs  Discharge Recommendations: Anticipate that the patient will have no further speech therapy needs after discharge from the hospital  Therapy Recommendations Upon DC: Patient / Family / Caregiver Education      Patient / Family Goals  Patient / Family Goal #1: The larger capsules I take sometimes stick  Goal #1 Outcome: Goal  met  Short Term Goal # 1: Pt will tolerate a thin/all liquids, easy to chew solids diet w/ no signs of aspiration related pulmonary complications  Goal Outcome # 1: Goal met      Aminata Guevara, SLP

## 2025-02-20 NOTE — CARE PLAN
The patient is Stable - Low risk of patient condition declining or worsening    Shift Goals  Clinical Goals: Monitor Na, pain management  Patient Goals: Pain control  Family Goals: At bedside    Progress made toward(s) clinical / shift goals:    Problem: Pain - Standard  Goal: Alleviation of pain or a reduction in pain to the patient’s comfort goal  Outcome: Progressing  Note: Utilizing PRN medications in order to control patients ongoing pain.      Problem: Neuro Status  Goal: Neuro status will remain stable or improve  Outcome: Progressing  Note: Q4 neuro checks performed and remained WNL.      Problem: Knowledge Deficit - Standard  Goal: Patient and family/care givers will demonstrate understanding of plan of care, disease process/condition, diagnostic tests and medications  Outcome: Progressing  Note: Plan of care discussed and all questions answered.      Problem: Fall Risk  Goal: Patient will remain free from falls  Note: Bed alarm in place and in use. Patient calls prior to ambulating.        Patient is not progressing towards the following goals:

## 2025-02-20 NOTE — THERAPY
Occupational Therapy Contact Note    Patient Name: Enedelia Pang  Age:  54 y.o., Sex:  female  Medical Record #: [id number]  Today's Date: 2/19/2025    OT orders received and eval attempted. Pt actively transferring to new floor. Will re-attempt when appropriate/able.

## 2025-02-20 NOTE — PROGRESS NOTES
Report called to Noreen Reece RN at 1415, pt transferred to [id number] via wheelchair, family updated, medications and belongings with pt.

## 2025-02-20 NOTE — THERAPY
"Physical Therapy   Initial Evaluation     Patient Name: Enedelia Pang  Age:  54 y.o., Sex:  female  Medical Record #: [id number]  Today's Date: 2/20/2025     Precautions  Precautions: Fall Risk  Comments: Recent R clavicle fx, no follow up, no WB status    Assessment  Patient is a 54 y.o. female who was admitted after a GLF with acute hyponatremia. PMH is complex and significant for spinal cord stimulator, chronic pain, seizures, hydrocephalus with  shunt, CVA, autoimmune cerebritis, endocarditis, pulmonary abscess, migraines. Per chart, pt dx with a R clavicle fx on 1/19/25 and she reports she has not followed up with orthopedics yet.    Pt received in bed and agreeable to PT evaluation. Pt reports she has had increase in falls over the last 6 weeks without a consistent mechanism and pt denies dizziness/lightheadedness prior to any of the falls. Pt reports that despite the falls, she has not been using an AD. Pt was able to mobilize at a supervised to SBA level with use of a FWW this session with no overt losses of balance. Pt provided with education on fall prevention for return home and recommendation to use an assistive device initially, while also working with HHPT or outpatient PT on balance training. Pt agreeable to this plan. No further acute PT needs at this time.    Patient will not be actively followed for physical therapy services at this time, however may be seen if requested by physician for 1 more visit within 30 days to address any discharge or equipment needs.     Plan    Physical Therapy Initial Treatment Plan   Duration: Discharge Needs Only    DC Equipment Recommendations: None  Discharge Recommendations: Recommend home health for continued physical therapy services     Subjective    \"I should probably use a walker at home shouldn't I?\"     Objective       02/20/25 1112   Precautions   Precautions Fall Risk   Comments Recent R clavicle fx, no follow up, no WB status   Vitals   Patient BP " Position Sitting   Blood Pressure 107/64   Vitals Comments Pt asymptomatic with positional changes. BP stable   Pain 0 - 10 Group   Therapist Pain Assessment Post Activity Pain Same as Prior to Activity;Nurse Notified  (no specific c/o pain)   Prior Living Situation   Housing / Facility 1 Story House   Steps Into Home 0   Steps In Home 0   Equipment Owned Front-Wheel Walker;4-Wheel Walker;Single Point Cane   Lives with - Patient's Self Care Capacity Spouse   Comments Pt report her  can assist if needed.   Prior Level of Functional Mobility   Bed Mobility Independent   Transfer Status Independent   Ambulation Independent   Ambulation Distance community distances   Assistive Devices Used None   Comments Pt reports no recent use of DME, although admits it would be a good idea and is receptive to use while working on balance.   History of Falls   History of Falls Yes   Date of Last Fall   (multiple recent falls)   Passive ROM Lower Body   Passive ROM Lower Body WDL   Active ROM Lower Body    Active ROM Lower Body  WDL   Strength Lower Body   Comments BLE grossly 4/5   Sensation Lower Body   Comments reports no acute change in LE sensation   Lower Body Muscle Tone   Lower Body Muscle Tone  WDL   Coordination Lower Body    Coordination Lower Body  WDL   Balance Assessment   Sitting Balance (Static) Fair +   Sitting Balance (Dynamic) Fair   Standing Balance (Static) Fair   Standing Balance (Dynamic) Fair   Weight Shift Sitting Fair   Weight Shift Standing Fair   Comments with FWW in standing   Bed Mobility    Supine to Sit Supervised   Sit to Supine Supervised   Scooting Supervised   Comments HOB elevated   Gait Analysis   Gait Level Of Assist Supervised   Assistive Device Front Wheel Walker   Distance (Feet) 150   # of Times Distance was Traveled 1   Deviation Bradykinetic   Comments Unsteady in standing with no AD, so pt requested to use a FWW for ambulation. No overt losses of balance. Recommend for home use.    Functional Mobility   Sit to Stand Standby Assist   Bed, Chair, Wheelchair Transfer Standby Assist   Mobility up with FWW   6 Clicks Assessment - How much HELP from from another person do you currently need... (If the patient hasn't done an activity recently, how much help from another person do you think he/she would need if he/she tried?)   Turning from your back to your side while in a flat bed without using bedrails? 4   Moving from lying on your back to sitting on the side of a flat bed without using bedrails? 4   Moving to and from a bed to a chair (including a wheelchair)? 4   Standing up from a chair using your arms (e.g., wheelchair, or bedside chair)? 4   Walking in hospital room? 3   Climbing 3-5 steps with a railing? 3   6 clicks Mobility Score 22   Education Group   Education Provided Role of Physical Therapist   Role of Physical Therapist Patient Response Patient;Acceptance;Explanation;Verbal Demonstration   Physical Therapy Initial Treatment Plan    Duration Discharge Needs Only   Anticipated Discharge Equipment and Recommendations   DC Equipment Recommendations None   Discharge Recommendations Recommend home health for continued physical therapy services   Interdisciplinary Plan of Care Collaboration   IDT Collaboration with  Nursing   Patient Position at End of Therapy In Bed;Bed Alarm On;Call Light within Reach;Tray Table within Reach;Phone within Reach   Collaboration Comments RN updated   Session Information   Date / Session Number  2/20: dc needs

## 2025-02-20 NOTE — DISCHARGE PLANNING
Received Choice Form @: [id number]  Agency/Facility Name: [facility]   Referral Sent Per Choice Form @: 9602

## 2025-02-20 NOTE — DISCHARGE PLANNING
Care Transition Team Assessment  LMSW met with pt to conduct assessment and verify demographics. Pt s AOX4 and provided all of the following information. Pt was admitted on 2/18/25 for Acute hyponatremia. Please see pt's H&P for prior medical history.     Pt's PCP is SHUN MOODY M.D.     Pt verified address on file. Pt lives in a first floor apartment complex with her .     Pt's emergency contact is pt's  Benoit Pang [phone].     Pt stated that she was mostly independent prior to admission with ALDS and IADLS with no use of DME. Pt stated that her  assist when needed.     Pt's preferred pharmacy is [facility].     Pt receives SS. Pt did not disclose amount.     Pt's insurance is [employer] Medicare advantage plan.     Upon DC pt will have means for transportation home.     Information Source  Orientation Level: Oriented X4  Information Given By: Patient  Who is responsible for making decisions for patient? : Patient    Readmission Evaluation  Is this a readmission?: No    Elopement Risk  Legal Hold: No  Ambulatory or Self Mobile in Wheelchair: Yes  Disoriented: No  Psychiatric Symptoms: None  History of Wandering: No  Elopement this Admit: No  Vocalizing Wanting to Leave: No  Displays Behaviors, Body Language Wanting to Leave: No-Not at Risk for Elopement  Elopement Risk: Not at Risk for Elopement    Interdisciplinary Discharge Planning  Primary Care Physician: SHUN MOODY M.D.  Lives with - Patient's Self Care Capacity: Spouse  Patient or legal guardian wants to designate a caregiver: No  Support Systems: Children, Family Member(s), Spouse / Significant Other  Housing / Facility: 1 Story Apartment / Condo    Discharge Preparedness  What is your plan after discharge?: Home with help  What are your discharge supports?: Spouse  Prior Functional Level: Ambulatory, Independent with Activities of Daily Living, Independent with Medication Management  Difficulity with ADLs:  None  Difficulity with IADLs: None    Functional Assesment  Prior Functional Level: Ambulatory, Independent with Activities of Daily Living, Independent with Medication Management    Finances  Financial Barriers to Discharge: No  Prescription Coverage: Yes    Vision / Hearing Impairment  Vision Impairment : No  Right Eye Vision: Wears Glasses  Left Eye Vision: Wears Glasses  Hearing Impairment : No    Advance Directive  Advance Directive?: None    Domestic Abuse  Have you ever been the victim of abuse or violence?: No  Possible Abuse/Neglect Reported to:: Not Applicable    Psychological Assessment  History of Substance Abuse: None  History of Psychiatric Problems: No  Non-compliant with Treatment: No  Newly Diagnosed Illness: No    Discharge Risks or Barriers  Discharge risks or barriers?: No    Anticipated Discharge Information  Discharge Disposition: D/T to home under A care in anticipation of covered skilled care (06)  Discharge Address: [facility] [address]   [place] [address]  Discharge Contact Phone Number: [phone]

## 2025-02-20 NOTE — CARE PLAN
The patient is Stable - Low risk of patient condition declining or worsening    Shift Goals  Clinical Goals: Monitor na, safety  Patient Goals: Rest, pain control  Family Goals: At bedside    Progress made toward(s) clinical / shift goals:  , trending NA, fall precautions in place.   Problem: Pain - Standard  Goal: Alleviation of pain or a reduction in pain to the patient’s comfort goal  Outcome: Progressing     Problem: Neuro Status  Goal: Neuro status will remain stable or improve  Outcome: Progressing       Patient is not progressing towards the following goals:

## 2025-02-20 NOTE — THERAPY
Speech Language Pathology   Daily Treatment     Patient Name: Enedelia Pang  AGE:  54 y.o., SEX:  female  Medical Record #: [id number]  Date of Service: 2/20/2025      Precautions:  Precautions: Fall Risk         Subjective  RN cleared patient for session. Patient received awake, agreeable to participate in session. Patient endorsed tolerance of EC7/TN0 diet, denies globus sensation.      Assessment  Patient able to self-feed without difficulty. Adequate oral bolus acceptance/containment/mastication/transit. No cough/throat clear appreciated with PO intake. No signs of esophageal dysfunction. No overt s/x of aspiration appreciated.       Clinical Impressions  Patient appears appropriate to continue EC7/TN0 diet. Service will no longer actively follow, re-consult with any change in status.      Recommendations  Diet Consistency: Thin/all Liquids, Easy to Chew Solids  Instrumentation: None indicated at this time  Medication: As tolerated  Supervision: Independent  Positioning: Fully upright and midline during oral intake  Risk Management : Alternate bites and sips, Small bites/sips, Physical mobility, as tolerated  Oral Care: BID           Anticipated Discharge Needs  Discharge Recommendations: Anticipate that the patient will have no further speech therapy needs after discharge from the hospital  Therapy Recommendations Upon DC: Patient / Family / Caregiver Education      Patient / Family Goals  Patient / Family Goal #1: The larger capsules I take sometimes stick  Goal #1 Outcome: Goal met  Short Term Goals  Short Term Goal # 1: Pt will tolerate a thin/all liquids, easy to chew solids diet w/ no signs of aspiration related pulmonary complications  Goal Outcome # 1: Goal met      Aminata Guevara, SLP

## 2025-02-20 NOTE — PROGRESS NOTES
Bedside report received from off going RN/tech: Noreen, assumed care of patient.     Fall Risk Score: MODERATE RISK  Fall risk interventions in place: Provide patient/family education based on risk assessment, Educate patient/family to call staff for assistance when getting out of bed, Place fall precaution signage outside patient door, Place patient in room close to nursing station, Utilize bed/chair fall alarm, Notify charge of high risk for huddle, and Bed alarm connected correctly  Bed type: Regular (Charles Score less than 17 interventions in place)  Patient on cardiac monitor: Yes  IVF/IV medications: Not Applicable   Oxygen: Room Air  Bedside sitter: Not Applicable   Isolation: Not applicable

## 2025-02-20 NOTE — DISCHARGE PLANNING
Case Management Discharge Planning    Admission Date: 2/18/2025  GMLOS: 2.6  ALOS: 2    6-Clicks ADL Score: 22  6-Clicks Mobility Score: 22      Anticipated Discharge Dispo: Discharge Disposition: D/T to home under A care in anticipation of covered skilled care (06)  Discharge Address: [facility] [address]  [facility] [address]  Discharge Contact Phone Number: [phone]    DME Needed: No    Action(s) Taken: LMSW met with pt due to receiving a HH order. [facility]. LMSW faxed choice form to [facility].     Escalations Completed: None    Medically Clear: Yes

## 2025-02-20 NOTE — PROGRESS NOTES
Notified MD patients BP in the 80's, 78/55, 78/42 and 83/59. Patient denying any dizziness lightheadedness, patient Axox4.  Orders for 1000 mL fluid LR bolus, and to recheck BP after.

## 2025-02-21 NOTE — DISCHARGE SUMMARY
Discharge Summary    CHIEF COMPLAINT ON ADMISSION  Chief Complaint   Patient presents with    Dizziness    GLF     Pt BIBA from home for a GLF that happened yesterday, as well as a fall 5 fays ago. Pt reports feeling dizzy often, causing her to fall. Pt arrives with bruising to right side of face. (-) LOC, (-) thinners.   A&Ox4,  PTA, given 4mg Zofran PTA.        Reason for Admission  ems     Admission Date  2/18/2025    CODE STATUS  Full Code    HPI & HOSPITAL COURSE  Enedelia Pang is a 54 y.o. female w/ hx of pseudotumor cerebri s/p  shunt, chronic migraine headaches, frequent falls.  She was admitted 2/18/2025 with 4 falls since December 2024 and dizziness.  In the ER she was found to have a Na:114.  She was admit to IMCU and started on NS with slow and appropriate improvement of her Na levels.  Echocardiogram was obtained which was largely normal.  MRI was ordered on admission however she has a spinal stimulator which is not compatible, she did not wish to stay for any further imaging or workup.  Sodium levels corrected appropriately, improved to 132 on day of discharge.  She was transferred down to telemetry on 2/20.  She was assessed by physical therapy and was recommended for home health.  She declined any dizziness, felt back to her baseline.  Unclear what the cause of the hyponatremia was.  She is able to closely follow-up with her PCP and neurologist and will have labs rechecked to ensure she is not having recurrence of hyponatremia.      Therefore, she is discharged in fair and stable condition to home with organized home healthcare and close outpatient follow-up.    The patient met 2-midnight criteria for an inpatient stay at the time of discharge.    Discharge Date  2/20/2025    FOLLOW UP ITEMS POST DISCHARGE  Follow-up with PCP, follow-up BMP in 1 week    DISCHARGE DIAGNOSES  Principal Problem:    Acute hyponatremia (POA: Yes)  Active Problems:    Mood disorder (HCC) (POA: Yes)     Hypokalemia (POA: Yes)    Hyperlipidemia (POA: Yes)    Seizures (HCC) (POA: Yes)    Substance dependence (HCC) (POA: Yes)    Hyponatremia (POA: Yes)    Transaminitis (POA: Yes)    Spinal cord stimulator status (POA: Yes)    Hypophosphatemia (POA: Unknown)    Ataxia (POA: Unknown)  Resolved Problems:    * No resolved hospital problems. *      FOLLOW UP  No future appointments.  [facility]  [address]  [place] [address]  [phone]        Elliott Power M.D.  [address]  [place] [address]  [phone]    Follow up  As needed, If symptoms worsen      MEDICATIONS ON DISCHARGE     Medication List        CHANGE how you take these medications        Instructions   aspirin 81 MG EC tablet   Take 81 mg by mouth every morning.  Dose: 81 mg            CONTINUE taking these medications        Instructions   divalproex  MG Tb24  Commonly known as: Depakote ER   Take 500 mg by mouth 2 times a day. 2 tablets = 500 mg.  Dose: 500 mg     donepezil 5 MG Tabs  Commonly known as: Aricept   Take 5 mg by mouth at bedtime.  Dose: 5 mg     DULoxetine 60 MG Cpep delayed-release capsule  Commonly known as: Cymbalta   Take 60 mg by mouth 2 times a day.  Dose: 60 mg     Emgality 120 MG/ML Soaj  Generic drug: Galcanezumab-gnlm   Inject 120 mg under the skin every 30 (thirty) days.  Dose: 120 mg     estradiol 0.5 MG tablet  Commonly known as: Estrace   Take 0.5 mg by mouth every day.  Dose: 0.5 mg     lacosamide 200 MG Tabs tablet  Commonly known as: Vimpat   Take 200 mg by mouth 2 times a day.  Dose: 200 mg     risperiDONE 3 MG Tabs  Commonly known as: RisperDAL   Take 3 mg by mouth 2 times a day.  Dose: 3 mg     traZODone 100 MG Tabs  Commonly known as: Desyrel   Take 100 mg by mouth at bedtime.  Dose: 100 mg     Ubrelvy 100 MG Tabs  Generic drug: Ubrogepant   Take 100 mg by mouth 2 times a day as needed (Migraine). May take a 2nd dose after 4 hours if migraine persists. Max of 2 tablets in 24  "hours.  Dose: 100 mg     Womens Multivitamin Tabs   Take 1 Tablet by mouth every day. \"Equate Women's Multivitamin/Multimineral\"  Dose: 1 Tablet     zonisamide 100 MG Caps  Commonly known as: Zonegran   Take 300 mg by mouth every evening.  Dose: 300 mg            STOP taking these medications      diphenhydrAMINE 50 MG/ML Soln  Commonly known as: Benadryl     HYDROcodone-acetaminophen 5-325 MG Tabs per tablet  Commonly known as: Norco     ROPINIRole 1 MG Tabs  Commonly known as: Requip              Allergies  Allergies   Allergen Reactions    Sumatriptan Anaphylaxis     Historical=\"Heart stops.\" Reaction  between 1995 to 1997    Prochlorperazine Rash and Swelling     Tongue swelling. Reaction as a teen. (compazine)  Tolerated Phenergan on 02/24/15    Vancomycin Shortness of Breath and Rash     Reaction in 2005.  D/W patient 8/31/15 - tolerated loading dose of vancomycin administered on 8/30/15 with some itching to chest with decreased infusion rate. Red Man Syndrome.  2018-tolerated slow drip with benadryl pre-med-had no problems.       DIET  Orders Placed This Encounter   Procedures    Diet Order Diet: Level 7 - Easy to Chew (Set-up assist, general swallow precautions; large pills 1x/time); Liquid level: Level 0 - Thin     Standing Status:   Standing     Number of Occurrences:   1     Diet::   Level 7 - Easy to Chew [22]              Set-up assist, general swallow precautions; large pills 1x/time     Liquid level:   Level 0 - Thin       ACTIVITY  As tolerated.  Weight bearing as tolerated    CONSULTATIONS  Critical care    PROCEDURES      LABORATORY  Lab Results   Component Value Date    SODIUM 132 (L) 02/20/2025    POTASSIUM 4.4 02/20/2025    CHLORIDE 101 02/20/2025    CO2 21 02/20/2025    GLUCOSE 92 02/20/2025    BUN 10 02/20/2025    CREATININE 0.62 02/20/2025        Lab Results   Component Value Date    WBC 5.7 02/20/2025    HEMOGLOBIN 11.8 (L) 02/20/2025    HEMATOCRIT 34.4 (L) 02/20/2025    PLATELETCT 299 " 02/20/2025        Total time of the discharge process exceeds 43 minutes.

## 2025-02-21 NOTE — CARE PLAN
The patient is Stable - Low risk of patient condition declining or worsening    Shift Goals  Clinical Goals: Monitor NA, echo, safety, pain control  Patient Goals: Pain control  Family Goals: na    Progress made toward(s) clinical / shift goals:    Problem: Pain - Standard  Goal: Alleviation of pain or a reduction in pain to the patient’s comfort goal  Outcome: Progressing     Problem: Knowledge Deficit - Stroke Education  Goal: Patient's knowledge of stroke and risk factors will improve  Outcome: Progressing     Problem: Dysphagia  Goal: Dysphagia will improve  Outcome: Progressing     Problem: Mobility - Stroke  Goal: Subluxation will be prevented or improved  Outcome: Progressing     Problem: Self Care  Goal: Patient will have the ability to perform ADLs independently or with assistance (bathe, groom, dress, toilet and feed)  Outcome: Progressing     Problem: Knowledge Deficit - Standard  Goal: Patient and family/care givers will demonstrate understanding of plan of care, disease process/condition, diagnostic tests and medications  Outcome: Progressing       Patient is not progressing towards the following goals:

## 2025-05-02 ENCOUNTER — APPOINTMENT (OUTPATIENT)
Dept: RADIOLOGY | Facility: MEDICAL CENTER | Age: 55
End: 2025-05-02
Attending: EMERGENCY MEDICINE
Payer: MEDICARE

## 2025-05-02 ENCOUNTER — HOSPITAL ENCOUNTER (EMERGENCY)
Facility: MEDICAL CENTER | Age: 55
End: 2025-05-03
Attending: EMERGENCY MEDICINE
Payer: MEDICARE

## 2025-05-02 DIAGNOSIS — Z86.73 HX OF COMPLETED STROKE: ICD-10-CM

## 2025-05-02 DIAGNOSIS — R47.9 SPEECH DISTURBANCE, UNSPECIFIED TYPE: ICD-10-CM

## 2025-05-02 DIAGNOSIS — R74.01 TRANSAMINITIS: ICD-10-CM

## 2025-05-02 DIAGNOSIS — R53.1 RIGHT SIDED WEAKNESS: ICD-10-CM

## 2025-05-02 LAB
ABO GROUP BLD: NORMAL
ALBUMIN SERPL BCP-MCNC: 4.7 G/DL (ref 3.2–4.9)
ALBUMIN/GLOB SERPL: 1.4 G/DL
ALP SERPL-CCNC: 311 U/L (ref 30–99)
ALT SERPL-CCNC: 776 U/L (ref 2–50)
ANION GAP SERPL CALC-SCNC: 11 MMOL/L (ref 7–16)
APPEARANCE UR: CLEAR
APTT PPP: 28.1 SEC (ref 24.7–36)
AST SERPL-CCNC: 530 U/L (ref 12–45)
BASOPHILS # BLD AUTO: 0.8 % (ref 0–1.8)
BASOPHILS # BLD: 0.05 K/UL (ref 0–0.12)
BILIRUB SERPL-MCNC: 0.7 MG/DL (ref 0.1–1.5)
BILIRUB UR QL STRIP.AUTO: NEGATIVE
BLD GP AB SCN SERPL QL: NORMAL
BUN SERPL-MCNC: 9 MG/DL (ref 8–22)
CALCIUM ALBUM COR SERPL-MCNC: 8.7 MG/DL (ref 8.5–10.5)
CALCIUM SERPL-MCNC: 9.3 MG/DL (ref 8.5–10.5)
CHLORIDE SERPL-SCNC: 98 MMOL/L (ref 96–112)
CO2 SERPL-SCNC: 23 MMOL/L (ref 20–33)
COLOR UR: YELLOW
CREAT SERPL-MCNC: 0.52 MG/DL (ref 0.5–1.4)
EKG IMPRESSION: NORMAL
EOSINOPHIL # BLD AUTO: 0.03 K/UL (ref 0–0.51)
EOSINOPHIL NFR BLD: 0.5 % (ref 0–6.9)
ERYTHROCYTE [DISTWIDTH] IN BLOOD BY AUTOMATED COUNT: 43.8 FL (ref 35.9–50)
GFR SERPLBLD CREATININE-BSD FMLA CKD-EPI: 111 ML/MIN/1.73 M 2
GLOBULIN SER CALC-MCNC: 3.3 G/DL (ref 1.9–3.5)
GLUCOSE SERPL-MCNC: 100 MG/DL (ref 65–99)
GLUCOSE UR STRIP.AUTO-MCNC: NEGATIVE MG/DL
HAV IGM SERPL QL IA: NORMAL
HBV CORE IGM SER QL: NORMAL
HBV SURFACE AG SER QL: NORMAL
HCT VFR BLD AUTO: 43.3 % (ref 37–47)
HCV AB SER QL: NORMAL
HGB BLD-MCNC: 14.6 G/DL (ref 12–16)
IMM GRANULOCYTES # BLD AUTO: 0.02 K/UL (ref 0–0.11)
IMM GRANULOCYTES NFR BLD AUTO: 0.3 % (ref 0–0.9)
INR PPP: 0.93 (ref 0.87–1.13)
KETONES UR STRIP.AUTO-MCNC: NEGATIVE MG/DL
LEUKOCYTE ESTERASE UR QL STRIP.AUTO: NEGATIVE
LYMPHOCYTES # BLD AUTO: 1.09 K/UL (ref 1–4.8)
LYMPHOCYTES NFR BLD: 18.3 % (ref 22–41)
MAGNESIUM SERPL-MCNC: 1.9 MG/DL (ref 1.5–2.5)
MCH RBC QN AUTO: 31.8 PG (ref 27–33)
MCHC RBC AUTO-ENTMCNC: 33.7 G/DL (ref 32.2–35.5)
MCV RBC AUTO: 94.3 FL (ref 81.4–97.8)
MICRO URNS: NORMAL
MONOCYTES # BLD AUTO: 0.37 K/UL (ref 0–0.85)
MONOCYTES NFR BLD AUTO: 6.2 % (ref 0–13.4)
NEUTROPHILS # BLD AUTO: 4.39 K/UL (ref 1.82–7.42)
NEUTROPHILS NFR BLD: 73.9 % (ref 44–72)
NITRITE UR QL STRIP.AUTO: NEGATIVE
NRBC # BLD AUTO: 0 K/UL
NRBC BLD-RTO: 0 /100 WBC (ref 0–0.2)
PH UR STRIP.AUTO: 6 [PH] (ref 5–8)
PLATELET # BLD AUTO: 295 K/UL (ref 164–446)
PMV BLD AUTO: 9 FL (ref 9–12.9)
POTASSIUM SERPL-SCNC: 4.3 MMOL/L (ref 3.6–5.5)
PROT SERPL-MCNC: 8 G/DL (ref 6–8.2)
PROT UR QL STRIP: NEGATIVE MG/DL
PROTHROMBIN TIME: 12.5 SEC (ref 12–14.6)
RBC # BLD AUTO: 4.59 M/UL (ref 4.2–5.4)
RBC UR QL AUTO: NEGATIVE
RH BLD: NORMAL
SODIUM SERPL-SCNC: 132 MMOL/L (ref 135–145)
SP GR UR STRIP.AUTO: 1.02
TROPONIN T SERPL-MCNC: 7 NG/L (ref 6–19)
UROBILINOGEN UR STRIP.AUTO-MCNC: 0.2 EU/DL
WBC # BLD AUTO: 6 K/UL (ref 4.8–10.8)

## 2025-05-02 PROCEDURE — 80074 ACUTE HEPATITIS PANEL: CPT

## 2025-05-02 PROCEDURE — 84484 ASSAY OF TROPONIN QUANT: CPT

## 2025-05-02 PROCEDURE — 700105 HCHG RX REV CODE 258: Performed by: EMERGENCY MEDICINE

## 2025-05-02 PROCEDURE — 70496 CT ANGIOGRAPHY HEAD: CPT

## 2025-05-02 PROCEDURE — 83735 ASSAY OF MAGNESIUM: CPT

## 2025-05-02 PROCEDURE — 96375 TX/PRO/DX INJ NEW DRUG ADDON: CPT

## 2025-05-02 PROCEDURE — 80307 DRUG TEST PRSMV CHEM ANLYZR: CPT

## 2025-05-02 PROCEDURE — 86900 BLOOD TYPING SEROLOGIC ABO: CPT

## 2025-05-02 PROCEDURE — 80053 COMPREHEN METABOLIC PANEL: CPT

## 2025-05-02 PROCEDURE — 86850 RBC ANTIBODY SCREEN: CPT

## 2025-05-02 PROCEDURE — 0042T CT-CEREBRAL PERFUSION ANALYSIS: CPT

## 2025-05-02 PROCEDURE — 86901 BLOOD TYPING SEROLOGIC RH(D): CPT

## 2025-05-02 PROCEDURE — 96374 THER/PROPH/DIAG INJ IV PUSH: CPT

## 2025-05-02 PROCEDURE — 81003 URINALYSIS AUTO W/O SCOPE: CPT

## 2025-05-02 PROCEDURE — 700111 HCHG RX REV CODE 636 W/ 250 OVERRIDE (IP): Mod: JZ | Performed by: EMERGENCY MEDICINE

## 2025-05-02 PROCEDURE — 70450 CT HEAD/BRAIN W/O DYE: CPT

## 2025-05-02 PROCEDURE — 71045 X-RAY EXAM CHEST 1 VIEW: CPT

## 2025-05-02 PROCEDURE — 93005 ELECTROCARDIOGRAM TRACING: CPT | Mod: TC | Performed by: EMERGENCY MEDICINE

## 2025-05-02 PROCEDURE — 99285 EMERGENCY DEPT VISIT HI MDM: CPT

## 2025-05-02 PROCEDURE — 700117 HCHG RX CONTRAST REV CODE 255: Performed by: EMERGENCY MEDICINE

## 2025-05-02 PROCEDURE — 80143 DRUG ASSAY ACETAMINOPHEN: CPT

## 2025-05-02 PROCEDURE — 85730 THROMBOPLASTIN TIME PARTIAL: CPT

## 2025-05-02 PROCEDURE — 70498 CT ANGIOGRAPHY NECK: CPT

## 2025-05-02 PROCEDURE — 99285 EMERGENCY DEPT VISIT HI MDM: CPT | Performed by: PSYCHIATRY & NEUROLOGY

## 2025-05-02 PROCEDURE — 85025 COMPLETE CBC W/AUTO DIFF WBC: CPT

## 2025-05-02 PROCEDURE — 36415 COLL VENOUS BLD VENIPUNCTURE: CPT

## 2025-05-02 PROCEDURE — 85610 PROTHROMBIN TIME: CPT

## 2025-05-02 RX ORDER — DIPHENHYDRAMINE HYDROCHLORIDE 50 MG/ML
25 INJECTION, SOLUTION INTRAMUSCULAR; INTRAVENOUS ONCE
Status: COMPLETED | OUTPATIENT
Start: 2025-05-02 | End: 2025-05-02

## 2025-05-02 RX ORDER — SODIUM CHLORIDE 9 MG/ML
1000 INJECTION, SOLUTION INTRAVENOUS ONCE
Status: COMPLETED | OUTPATIENT
Start: 2025-05-02 | End: 2025-05-03

## 2025-05-02 RX ORDER — HALOPERIDOL 5 MG/ML
2.5 INJECTION INTRAMUSCULAR ONCE
Status: COMPLETED | OUTPATIENT
Start: 2025-05-02 | End: 2025-05-02

## 2025-05-02 RX ADMIN — DIPHENHYDRAMINE HYDROCHLORIDE 25 MG: 50 INJECTION, SOLUTION INTRAMUSCULAR; INTRAVENOUS at 21:16

## 2025-05-02 RX ADMIN — HALOPERIDOL LACTATE 2.5 MG: 5 INJECTION, SOLUTION INTRAMUSCULAR at 21:16

## 2025-05-02 RX ADMIN — IOHEXOL 40 ML: 350 INJECTION, SOLUTION INTRAVENOUS at 21:30

## 2025-05-02 RX ADMIN — SODIUM CHLORIDE 1000 ML: 9 INJECTION, SOLUTION INTRAVENOUS at 21:15

## 2025-05-02 RX ADMIN — IOHEXOL 80 ML: 350 INJECTION, SOLUTION INTRAVENOUS at 21:30

## 2025-05-02 ASSESSMENT — FIBROSIS 4 INDEX: FIB4 SCORE: 0.28

## 2025-05-03 VITALS
OXYGEN SATURATION: 100 % | DIASTOLIC BLOOD PRESSURE: 61 MMHG | HEIGHT: 63 IN | TEMPERATURE: 97 F | SYSTOLIC BLOOD PRESSURE: 105 MMHG | HEART RATE: 89 BPM | BODY MASS INDEX: 26.98 KG/M2 | RESPIRATION RATE: 14 BRPM | WEIGHT: 152.3 LBS

## 2025-05-03 LAB
AMPHET UR QL SCN: NEGATIVE
APAP SERPL-MCNC: <5 UG/ML (ref 10–30)
BARBITURATES UR QL SCN: NEGATIVE
BENZODIAZ UR QL SCN: NEGATIVE
BZE UR QL SCN: NEGATIVE
CANNABINOIDS UR QL SCN: POSITIVE
FENTANYL UR QL: NEGATIVE
METHADONE UR QL SCN: NEGATIVE
OPIATES UR QL SCN: NEGATIVE
OXYCODONE UR QL SCN: POSITIVE
PCP UR QL SCN: NEGATIVE
PROPOXYPH UR QL SCN: NEGATIVE

## 2025-05-03 NOTE — ED NOTES
Report received from CAROLINA Choudhary. Pt brought from CT to room, connected to monitor. Family at bedside.

## 2025-05-03 NOTE — CONSULTS
Neurology STROKE H&P  Neurohospitalist Service, [facility]    Referring Physician: Darin Wilson M.D.    STROKE:   Chief Complaint   Patient presents with    Possible Stroke     Patient BIBA by [facility] for sudden onset of headache, blurred vision and slurred speech. Patient with hx of multiple strokes and migraines in the past.       To obtain the most accurate data regarding the time called, and time patient seen, refer to the stroke run-sheet and chart.  For time of CT, refer to the radiology report. See A&P below for TPA Decision and door to needle time if and when applicable.    HPI: Enedelia Pang is a pleasant 54 y.o. female with past medical history significant for previous stroke with residual right-sided weakness, history of complex migraine,  shunt placement, spinal cord stimulator placement and right shoulder injury with limitation of range of motion who was brought to emergency room for acute onset of headache, blurry vision and slurred speech.  Paramedics were called and noted slurred speech and activated stroke code in the field.  However, in the emergency room, she did not have slurred speech and continue with headache.  She states she has had similar episode with her headache in the past.  At present time there is no new neurological deficit.    Review of systems: In addition to what is detailed in the HPI above, all other systems reviewed and are negative.    Past Medical History:    has a past medical history of Allergy, unspecified not elsewhere classified, Anemia (10/05/2017), Anesthesia, Anxiety, Anxiety, generalized, Arthritis, autoimmune, Autoimmune disorder (HCC), Back pain, Clostridium difficile diarrhea (2014), Depression, Elevated liver enzymes (2017), Fall, H/O Clostridium difficile infection (2014), MRSA infection (2003), Hydrocephalus (HCC) (2015), Hyponatremia (9/28/2019), Joint pain (09/10/2019), Migraine with aura, with intractable migraine, so stated,  "without mention of status migrainosus, MRSA (methicillin resistant Staphylococcus aureus) (08/2015), MRSA (methicillin resistant Staphylococcus aureus) (12/2017), MRSA (methicillin resistant Staphylococcus aureus) (2018), Pituitary abnormality ([facility]) (2002), Port or reservoir infection (10/3/2015), Requires supplemental oxygen, Seizure ([facility]) (started 2017), Sepsis ([facility]) (8/30/2015), Staph infection, and Stroke ([facility]) (2007).    She has no past medical history of Addisons disease ([facility]) or Anginal syndrome ([facility]).    FHx:  family history includes Alcohol abuse in her father; Diabetes in her father; Multiple Sclerosis in her mother.    SHx:   reports that she has never smoked. She has never used smokeless tobacco. She reports that she does not currently use alcohol. She reports that she does not use drugs.    Allergies:  Allergies   Allergen Reactions    Sumatriptan Anaphylaxis     Historical=\"Heart stops.\" Reaction  between 1995 to 1997    Prochlorperazine Rash and Swelling     Tongue swelling. Reaction as a teen. (compazine)  Tolerated Phenergan on 02/24/15    Vancomycin Shortness of Breath and Rash     Reaction in 2005.  D/W patient 8/31/15 - tolerated loading dose of vancomycin administered on 8/30/15 with some itching to chest with decreased infusion rate. Red Man Syndrome.  2018-tolerated slow drip with benadryl pre-med-had no problems.       Medications:  No current facility-administered medications for this encounter.    Current Outpatient Medications:     donepezil (ARICEPT) 5 MG Tab, Take 5 mg by mouth at bedtime., Disp: , Rfl:     estradiol (ESTRACE) 0.5 MG tablet, Take 0.5 mg by mouth every day., Disp: , Rfl:     lacosamide (VIMPAT) 200 MG Tab tablet, Take 200 mg by mouth 2 times a day., Disp: , Rfl:     traZODone (DESYREL) 100 MG Tab, Take 100 mg by mouth at bedtime., Disp: , Rfl:     zonisamide (ZONEGRAN) 100 MG Cap, Take 300 mg by mouth every evening., Disp: , Rfl:     Galcanezumab-gnlm (EMGALITY) 120 MG/ML " "Solution Auto-injector, Inject 120 mg under the skin every 30 (thirty) days., Disp: , Rfl:     Multiple Vitamins-Minerals (WOMENS MULTIVITAMIN) Tab, Take 1 Tablet by mouth every day. \"Equate Women's Multivitamin/Multimineral\", Disp: , Rfl:     Ubrogepant (UBRELVY) 100 MG Tab, Take 100 mg by mouth 2 times a day as needed (Migraine). May take a 2nd dose after 4 hours if migraine persists. Max of 2 tablets in 24 hours., Disp: , Rfl:     divalproex ER (DEPAKOTE ER) 250 MG TABLET SR 24 HR, Take 500 mg by mouth 2 times a day. 2 tablets = 500 mg., Disp: , Rfl:     DULoxetine (CYMBALTA) 60 MG Cap DR Particles delayed-release capsule, Take 60 mg by mouth 2 times a day., Disp: , Rfl:     risperiDONE (RISPERDAL) 3 MG Tab, Take 3 mg by mouth 2 times a day., Disp: , Rfl:     aspirin 81 MG EC tablet, Take 81 mg by mouth every morning., Disp: , Rfl:     Physical Examination:    Vitals:    05/02/25 2100 05/02/25 2112 05/02/25 2118 05/02/25 2141   BP:   108/66 113/69   Pulse:   (!) 106 98   SpO2:   92% 100%   Weight:  69.1 kg (152 lb 4.8 oz)     Height: 1.6 m (5' 3\")          General:   Patient is awake and in no acute distress  Neck: Full range of motion  Eyes: Midline, Pupils reactive to light.  CV: RRR  Lungs: No respiratory distress  Extremities: No cyanosis, warm, no significant edema.    NEUROLOGICAL EXAM:   Mental status: Awake, alert and fully oriented, follows commands  Speech and language: speech is fluent and not dysarthric. The patient is able to name and repeat.  Cranial nerve exam: Pupils are equal, round and reactive to light bilaterally. Visual fields are full. Extraocular muscles are intact. Sensation in the face is intact to light touch. Face is symmetric. Hearing to finger rub equal. Palate elevates symmetrically. Shoulder shrug is full with some limitation of shoulder movement on the right. Tongue is midline.  Motor exam: Sustain antigravity in all 4 extremities with no downward drift, however she has limitation " of movement on the right side and has baseline weakness of right upper and lower extremity. No abnormal movements were seen on exam.  Sensory exam: No sensory deficits identified   Coordination: no gross ataxia noted on exam  Plantar reflexes: Equivocal  Gait: deferred    NIH Stroke Scale:    1a. Level of Consciousness (Alert, drowsy, etc): 0= Alert    1b. LOC Questions (Month, age): 0= Answers both correctly    1c. LOC Commands (Open/close eyes make fist/let go): 0= Obeys both correctly    2.   Best Gaze (Eyes open - patient follows examiner's finger on face): 0= Normal    3.   Visual Fields (introduce visual stimulus/threat to patient's field quadrants): 0= No visual loss  4.   Facial Paresis (Show teeth, raise eyebrows and squeeze eyes shut): 0= Normal     5a. Motor Arm - Left (Elevate arm to 90 degrees if patient is sitting, 45 degrees if  supine): 0= No drift    5b. Motor Arm - Right (Elevate arm to 90 degrees if patient is sitting, 45 degrees if supine): 1= Drift    6a. Motor Leg - Left (Elevate leg 30 degrees with patient supine): 0= No drift    6b. Motor Leg - Right  (Elevate leg 30 degrees with patient supine): 0= No drift    7.   Limb Ataxia (Finger-nose, heel down shin): 0= No ataxia    8.   Sensory (Pin prick to face, arm, trunk and leg - compare side to side): 0= Normal    9.  Best Language (Name item, describe a picture and read sentences): 0= No aphasia    10. Dysarthria (Evaluate speech clarity by patient repeating listed words): 0= Normal articulation    11. Extinction and Inattention (Use information from prior testing to identify neglect or  double simultaneous stimuli testing): 0= No neglect    Total NIH Score: 1    Baseline modified Alonzo Scale (MRS): 1 = No significant disability, despite symptoms; able to perform all usual duties and activities    Objective Data:    Labs:  Lab Results   Component Value Date/Time    PROTHROMBTM 12.5 05/02/2025 08:43 PM    INR 0.93 05/02/2025 08:43 PM      Lab  Results   Component Value Date/Time    WBC 6.0 05/02/2025 08:43 PM    RBC 4.59 05/02/2025 08:43 PM    HEMOGLOBIN 14.6 05/02/2025 08:43 PM    HEMATOCRIT 43.3 05/02/2025 08:43 PM    MCV 94.3 05/02/2025 08:43 PM    MCH 31.8 05/02/2025 08:43 PM    MCHC 33.7 05/02/2025 08:43 PM    MPV 9.0 05/02/2025 08:43 PM    NEUTSPOLYS 73.90 (H) 05/02/2025 08:43 PM    LYMPHOCYTES 18.30 (L) 05/02/2025 08:43 PM    MONOCYTES 6.20 05/02/2025 08:43 PM    EOSINOPHILS 0.50 05/02/2025 08:43 PM    EOSINOPHILS 0 06/15/2021 10:05 AM    BASOPHILS 0.80 05/02/2025 08:43 PM    HYPOCHROMIA 1+ 05/01/2021 05:51 AM    ANISOCYTOSIS 1+ 05/07/2021 03:30 AM      Lab Results   Component Value Date/Time    SODIUM 132 (L) 05/02/2025 08:43 PM    POTASSIUM 4.3 05/02/2025 08:43 PM    CHLORIDE 98 05/02/2025 08:43 PM    CO2 23 05/02/2025 08:43 PM    GLUCOSE 100 (H) 05/02/2025 08:43 PM    BUN 9 05/02/2025 08:43 PM    CREATININE 0.52 05/02/2025 08:43 PM      Lab Results   Component Value Date/Time    CHOLSTRLTOT 167 02/19/2025 03:29 AM    LDL 92 02/19/2025 03:29 AM    HDL 62 02/19/2025 03:29 AM    TRIGLYCERIDE 67 02/19/2025 03:29 AM       Lab Results   Component Value Date/Time    ALKPHOSPHAT 311 (H) 05/02/2025 08:43 PM    ASTSGOT 530 (H) 05/02/2025 08:43 PM    ALTSGPT 776 (H) 05/02/2025 08:43 PM    TBILIRUBIN 0.7 05/02/2025 08:43 PM      Lab Results   Component Value Date/Time    HBA1C 5.1 03/10/2020 08:57 AM       Imaging/Testing:    I interpreted and/or reviewed the patient's neuroimaging    DX-CHEST-PORTABLE (1 VIEW)   Final Result         1. No acute cardiopulmonary abnormalities are identified.      CT-CTA NECK WITH & W/O-POST PROCESSING   Final Result      1. No evidence of flow-limiting stenosis in the cervical carotid or cervical vertebral arteries.      CT-CTA HEAD WITH & W/O-POST PROCESS   Final Result         1. No hemodynamically significant narrowing of the major intracranial vessels.      CT-HEAD W/O   Final Result         1. No acute intracranial  abnormality. No evidence of acute intracranial hemorrhage or mass lesion.                           CT-CEREBRAL PERFUSION ANALYSIS    (Results Pending)       Assessment:  Enedelia Pang is a 54 y.o. female with past medical history significant for previous stroke with residual right-sided weakness, history of complex migraine,  shunt placement, spinal cord stimulator placement and right shoulder injury with limitation of range of motion who was brought to emergency room for acute onset of headache, blurry vision and slurred speech.  She continued to have headache but no new neurological deficit.  She has history of complex migraine and I think this might be another episode of migrainous headache.  Brain CT, CT angiogram of the head and neck are all unremarkable.  CT perfusion has not been processed.  (Technical issues?)  She has a spinal stimulator and not sure if she is able to proceed with brain MRI.  Suggest to treat her headache and if back to baseline okay to DC from my standpoint.     The plan of care above has been discussed with Darin Wilson M.D.    Upon my evaluation, this patient had a high probability of imminent or life-threatening deterioration due to possible cerebrovascular accident which required my direct attention, intervention, and personal management.  I personally provided 51 minutes of total critical care time outside of time spent on separately billable/documented procedures. Time includes: review of laboratory data, review of radiology studies, discussion with consultants, discussion with family/patient, monitoring for potential decompensation.  Interventions were performed as documented in the chart.      Please note that this dictation was created using voice recognition software. I have made every reasonable attempt to correct obvious errors, but I expect that there are errors of grammar and possibly content that I did not discover before finalizing the note.       Bashir Ibarra,  MD  [facility]

## 2025-05-03 NOTE — ED PROVIDER NOTES
ED Provider Note    CHIEF COMPLAINT  Chief Complaint   Patient presents with    Possible Stroke     Patient BIBA by [facility] for sudden onset of headache, blurred vision and slurred speech. Patient with hx of multiple strokes and migraines in the past.     EXTERNAL RECORDS REVIEWED  Outpatient Notes reviewed    HPI/ROS  LIMITATION TO HISTORY   Select: : None  OUTSIDE HISTORIAN(S):  Family at [facility] and EMS report given at charge desk    Enedelia Pang is a 54 y.o. female who presents as a stroke activation from the field.  Patient was with , has a history of prior TIA and strokes and prior complicated migraines with right-sided strength deficits at baseline.  She was reported to have some sudden onset of headache, blurry vision and slurred speech that appear to be somewhat new.  Does not appear that she had any obvious focal weakness at that time based on her history they called EMS.  There was a perceived slurred speech that appeared to be new in the field and stroke was activated.  No blood thinners.  It was noted that the patient had no significant expressive aphasia on arrival it is very difficult to ascertain as she was having some headache symptoms and has a history of prior migraines though she says this is a somewhat more severe scale.  She had some drift on the right arm and leg.  Initial NIH of 3 though I do believe it is very possible for 2 of these to be related to chronic stroke changes.    PAST MEDICAL HISTORY   has a past medical history of Allergy, unspecified not elsewhere classified, Anemia (10/05/2017), Anesthesia, Anxiety, Anxiety, generalized, Arthritis, autoimmune, Autoimmune disorder ([facility]), Back pain, Clostridium difficile diarrhea (2014), Depression, Elevated liver enzymes (2017), Fall, H/O Clostridium difficile infection (2014), MRSA infection (2003), Hydrocephalus ([facility]) (2015), Hyponatremia (9/28/2019), Joint pain (09/10/2019), Migraine with aura, with intractable migraine, so  stated, without mention of status migrainosus, MRSA (methicillin resistant Staphylococcus aureus) (08/2015), MRSA (methicillin resistant Staphylococcus aureus) (12/2017), MRSA (methicillin resistant Staphylococcus aureus) (2018), Pituitary abnormality ([facility]) (2002), Port or reservoir infection (10/3/2015), Requires supplemental oxygen, Seizure ([facility]) (started 2017), Sepsis ([facility]) (8/30/2015), Staph infection, and Stroke ([facility]) (2007).    SURGICAL HISTORY   has a past surgical history that includes tonsillectomy (1985); other neurological surg (5596-1795); irrigation & debridement neuro (N/A, 6/28/2015); lumbar exploration (N/A, 8/31/2015); spinal cord stimulator (12/19/2016);  shunt insertion (5/31/2017); liver biopsy (07/24/2017); cholecystectomy (2001); appendectomy (1982); spinal cord stimulator (05/24/2018); spinal cord stimulator (2003); knee arthroscopy (Right, 1990); full thickness skin graft (Left, 04/2018); other surgical procedure (11/10/2017); other neurological surg (08/2015); endometrial ablation (2001); tubal coagulation laparoscopic bilateral (Bilateral, 2001); ins/rplc spi npg/rcvr pocket (9/13/2019); other (Left, 01/23/2020); thoracoscopy,dx no bx (Right, 4/28/2021); decortication (Right, 4/28/2021); thoracotomy (Right, 5/17/2021); and decortication (5/17/2021).    FAMILY HISTORY  Family History   Problem Relation Age of Onset    Multiple Sclerosis Mother     Diabetes Father     Alcohol abuse Father      SOCIAL HISTORY  Social History     Tobacco Use    Smoking status: Never    Smokeless tobacco: Never   Vaping Use    Vaping status: Never Used   Substance and Sexual Activity    Alcohol use: Not Currently    Drug use: Never    Sexual activity: Not on file     CURRENT MEDICATIONS  Home Medications    **Home medications have not yet been reviewed for this encounter**       Audit from Redirected Encounters    **Home medications have not yet been reviewed for this encounter**       ALLERGIES  Allergies  "  Allergen Reactions    Sumatriptan Anaphylaxis     Historical=\"Heart stops.\" Reaction  between 1995 to 1997    Prochlorperazine Rash and Swelling     Tongue swelling. Reaction as a teen. (compazine)  Tolerated Phenergan on 02/24/15    Vancomycin Shortness of Breath and Rash     Reaction in 2005.  D/W patient 8/31/15 - tolerated loading dose of vancomycin administered on 8/30/15 with some itching to chest with decreased infusion rate. Red Man Syndrome.  2018-tolerated slow drip with benadryl pre-med-had no problems.     PHYSICAL EXAM  VITAL SIGNS: /61   Pulse 89   Temp 36.1 °C (97 °F) (Temporal)   Resp 14   Ht 1.6 m (5' 3\")   Wt 69.1 kg (152 lb 4.8 oz)   SpO2 100%   BMI 26.98 kg/m²    Genl: F sitting in gurney uncomfortably, speaking clearly, appears in moderate distress   Head: NC/AT   ENT: Mucous membranes moist, posterior pharynx clear, uvula midline, nares patent bilaterally   Eyes: Normal sclera, pupils equal round reactive to light  Neck: Supple, FROM, no LAD appreciated   Pulmonary: Lungs are clear to auscultation bilaterally  Chest: No TTP  CV:  RRR, no murmur appreciated  Abdomen: soft, NT/ND; no rebound/guarding, no masses palpated, no HSM   : no CVA or suprapubic tenderness   Musculoskeletal: Pain free ROM of the neck. Moving upper and lower extremities in spontaneous and coordinated fashion  Neuro: Mental Status: Speech fluent without noticeable errors. Follows all commands. Subtle inconsistent dysarthria with no acute apraxia.  Cranial Nerves: Pupils equal round and reactive to light. Extraocular motion intact. Visual fields intact. No nystagmus. CN V1-V3 intact to light touch. No facial asymmetry. Hearing clinically intact bilaterally. Tongue protrusion midline. No uvular deviation. Normal shoulder shrug and head turn.  Motor:  RUE: 5/5 with hand , 3/5 with flexion at the elbow 4/5 with extension at the elbow  LUE: 5/5 with hand , 5/5 with flexion at the elbow 5/5 with extension " at the elbow  RLE: 4/5 with leg raise, 4/5 with plantar flexion, 4/5 with dorsal flexion  LLE: 5/5 with leg raise, 5/5 with plantar flexion, 5/5 with dorsal flexion  Sensation to light touch intact throughout  Reflexes 2+ Patellar tendons  Gait not assessed due to acuity  Skin: No rash or lesions.  No pallor or jaundice.  No cyanosis.  Warm and dry.     EKG/LABS  Labs Reviewed   CBC WITH DIFFERENTIAL - Abnormal; Notable for the following components:       Result Value    Neutrophils-Polys 73.90 (*)     Lymphocytes 18.30 (*)     All other components within normal limits   COMP METABOLIC PANEL - Abnormal; Notable for the following components:    Sodium 132 (*)     Glucose 100 (*)     AST(SGOT) 530 (*)     ALT(SGPT) 776 (*)     Alkaline Phosphatase 311 (*)     All other components within normal limits   URINE DRUG SCREEN - Abnormal; Notable for the following components:    Oxycodone Positive (*)     Cannabinoid Metab Positive (*)     All other components within normal limits   ACETAMINOPHEN - Abnormal; Notable for the following components:    Acetaminophen -Tylenol <5.0 (*)     All other components within normal limits   PROTHROMBIN TIME   APTT   COD (ADULT)   TROPONIN   URINALYSIS   MAGNESIUM   ESTIMATED GFR   HEPATITIS PANEL ACUTE(4 COMPONENTS)     Results for orders placed or performed during the hospital encounter of 25   EKG (NOW)   Result Value Ref Range    Report       [facility] Emergency Dept.    Test Date:  2025  Pt Name:    BRIGID DAWSON                 Department: ER  MRN:        [id number]                      Room:       [facility]  Gender:     Female                       Technician: [id number]  :        1970                   Requested By:YONI OLSEN  Order #:    [id number]                    Arline MD: Steve Webster MD    Measurements  Intervals                                Axis  Rate:       106                          P:          62  NM:         175                           QRS:        -3  QRSD:       102                          T:          44  QT:         349  QTc:        464    Interpretive Statements  Sinus tachycardia, rate of 106, normal intervals, normal axis, no acute  ischemia or infarction.  Compared to ECG 02/18/2025 02:14:34  Electronically Signed On 05- 21:37:36 PDT by Steve Webster MD       *Note: Due to a large number of results and/or encounters for the requested time period, some results have not been displayed. A complete set of results can be found in Results Review.     I have independently interpreted this EKG    RADIOLOGY/PROCEDURES   I have independently interpreted the diagnostic imaging associated with this visit and am waiting the final reading from the radiologist.   My preliminary interpretation is as follows: No abnormalities in chest x-ray, CTA imaging of the head and neck is without any occlusive changes or flow-limiting stenosis.  No evidence of intracranial mass or hemorrhage.  Neurologist says there is no abnormalities of the perfusion study.    Radiologist interpretation:  DX-CHEST-PORTABLE (1 VIEW)   Final Result         1. No acute cardiopulmonary abnormalities are identified.      CT-CTA NECK WITH & W/O-POST PROCESSING   Final Result      1. No evidence of flow-limiting stenosis in the cervical carotid or cervical vertebral arteries.      CT-CTA HEAD WITH & W/O-POST PROCESS   Final Result         1. No hemodynamically significant narrowing of the major intracranial vessels.      CT-HEAD W/O   Final Result         1. No acute intracranial abnormality. No evidence of acute intracranial hemorrhage or mass lesion.                           CT-CEREBRAL PERFUSION ANALYSIS    (Results Pending)     COURSE & MEDICAL DECISION MAKING    ASSESSMENT, COURSE AND PLAN  Care Narrative: Pt is seen and evaluated for symptoms as described above.  The pt was a pre-hospital stroke activation.  I am concerned about her presenting symptomology, including: Prior  stroke history, on the possibility of worsening slurred speech and it is reassuring to see that it does appear after both my assessment and the neurologist assessment that there is not a true large vessel occlusion.  Patient is within the window but does not have findings that would point towards need for emergent TNK.  There is a history of migraines, seems slightly atypical but presenting symptomology could be due to atypical migraine and this will be further pursued in addition to metabolic derangements, urinary tract and infections and other sources of these types of atypical symptoms while CT imaging of the head will be obtained.    Notified by neurologist that the CT perfusion and CT angiography was unremarkable.  He would recommend treating this as a typical migraine at this time.    Lab work came back showing no leukocytosis or concerning anemia, no evidence of troponin elevation or ectopy on the monitor.  No signs of acute ischemia and no LISETH.  Patient's CMP showed no glucose abnormalities, nonspecific LFT changes with normal bilirubin.  She has no pain or discomfort and says that while she has been on Depakote and other medications in the past she has had intermittent transaminitis and this is all self resolved intermittently.  There is no coagulopathy, she has oxycodone and cannabinoids on her urine drug screen with no evidence of infection or stone etiology.  Hepatitis panel incidentally came back as nonreactive during her time in the emergency department.  With fluids and period of observation the patient was feeling improved.  She has normal Tylenol level, as she has had issues with intermittent transaminitis in the past and shows no signs of belly pain, mental status changes and has been treated with atypical headache medications and has complete alleviation I do believe this is reasonable.  Acutely the scans are reassuring, she is instructed on the importance of ongoing hydration, recording her  headache frequency for outpatient follow-up and strict return precautions should she develop fevers chills or other localizing symptomology.    Questions are addressed, I do not see signs of acute infectious etiology, I see no other evolving neurological process or signs of increased intracranial pressure and the patient is established he can follow-up with repeat labs for assessment of her LFTs.    Discharged home in stable condition.    DISPOSITION AND DISCUSSIONS  I have discussed management of the patient with the following physicians and CARMELO's:  none    Discussion of management with other QHP or appropriate source(s): None     Escalation of care considered, and ultimately not performed:acute inpatient care management, however at this time, the patient is most appropriate for outpatient management    Barriers to care at this time, including but not limited to: none.     Decision tools and prescription drugs considered including, but not limited to: none.    FINAL DIAGNOSIS  1. Speech disturbance, unspecified type    2. Right sided weakness    3. Hx of completed stroke    4. Transaminitis      Electronically signed by: Darin Wilson M.D., 5/2/2025 8:51 PM

## 2025-05-03 NOTE — ED TRIAGE NOTES
"Chief Complaint   Patient presents with    Possible Stroke     Patient BIBA by [facility] for sudden onset of headache, blurred vision and slurred speech. Patient with hx of multiple strokes and migraines in the past.   LKW 1900    Pt is alert and oriented, speaking in full sentences, follows commands and responds appropriately to questions. Resperations are even and unlabored.      Patient and staff wearing appropriate PPE.    Ht 1.6 m (5' 3\")    "

## (undated) DEVICE — TUBING CLEARLINK DUO-VENT - C-FLO (48EA/CA)

## (undated) DEVICE — GOWN SURGICAL X-LARGE ULTRA - FILM-REINFORCED (20/CA)

## (undated) DEVICE — BAG, SPONGE COUNT 50600

## (undated) DEVICE — SODIUM CHL IRRIGATION 0.9% 1000ML (12EA/CA)

## (undated) DEVICE — GLOVE SZ 7.5 LF PROTEXIS (50PR/BX)

## (undated) DEVICE — LACTATED RINGERS INJ 1000 ML - (14EA/CA 60CA/PF)

## (undated) DEVICE — SUTURE GENERAL

## (undated) DEVICE — GLOVE BIOGEL PI ORTHO SZ 6 SURGICAL PF LF (40PR/BX)

## (undated) DEVICE — DRAPE CHEST/BREAST - (12EA/CA)

## (undated) DEVICE — PERFORATER DISP TIP DGR-0

## (undated) DEVICE — CANISTER SUCTION RIGID RED 1500CC (40EA/CA)

## (undated) DEVICE — SEALANT TISSUE CLOSURE KIT FIBIRIN VISTASEAL 10ML (1EA/BX)

## (undated) DEVICE — PROTECTOR ULNA NERVE - (36PR/CA)

## (undated) DEVICE — SUTURE 3-0 VICRYL PLUS SH - 8X 18 INCH (12/BX)

## (undated) DEVICE — TUBE E-T HI-LO CUFF 7.0MM (10EA/PK)

## (undated) DEVICE — CHLORAPREP 26 ML APPLICATOR - ORANGE TINT(25/CA)

## (undated) DEVICE — CONNECTOR Y TBG CRTY 5 IN 1 STERILE (50EA/CA)

## (undated) DEVICE — TUBE CHEST 32FR. RIGHT ANGLED (10EA/CA)

## (undated) DEVICE — MASK ANESTHESIA ADULT  - (100/CA)

## (undated) DEVICE — CATHETER VENTRICULAR TRAUMA 35CM STRIP TUBING LRG

## (undated) DEVICE — HEADREST PRONEVIEW LARGE - (10/CA)

## (undated) DEVICE — CANISTER SUCTION 3000ML MECHANICAL FILTER AUTO SHUTOFF MEDI-VAC NONSTERILE LF DISP  (40EA/CA)

## (undated) DEVICE — SYSTEM PREVENA INCISION MNGM - (1/EA)

## (undated) DEVICE — SPONGE GAUZESTER 4 X 4 4PLY - (128PK/CA)

## (undated) DEVICE — SUTURE 2-0 VICRYL CT-2  8 X 18 INCH (12EA/BX)

## (undated) DEVICE — ARMREST CRADLE FOAM - (2PR/PK 12PR/CA)

## (undated) DEVICE — SET EXTENSION WITH 2 PORTS (48EA/CA) ***PART #2C8610 IS A SUBSTITUTE*****

## (undated) DEVICE — DRAPE IOBAN II INCISE 23X17 - (10EA/BX 4BX/CA)

## (undated) DEVICE — SYRINGE 10 ML CONTROL LL (25EA/BX 4BX/CA)

## (undated) DEVICE — SOD. CHL. INJ. 0.9% 1000 ML - (14EA/CA 60CA/PF)

## (undated) DEVICE — PACK NEURO - (2EA/CA)

## (undated) DEVICE — ELECTRODE DUAL RETURN W/ CORD - (50/PK)

## (undated) DEVICE — GLOVE BIOGEL PI INDICATOR SZ 6.0 SURGICAL PF LF -(200PR/CA)

## (undated) DEVICE — WATER IRRIGATION STERILE 1000ML (12EA/CA)

## (undated) DEVICE — NEEDLE, DISP 16G X 1-1/2 BLUNT

## (undated) DEVICE — TOWEL STOP TIMEOUT SAFETY FLAG (40EA/CA)

## (undated) DEVICE — TUBE CHEST 32FR. STRAIGHT - (10EA/CA)

## (undated) DEVICE — DRAPE LARGE 3 QUARTER - (20/CA)

## (undated) DEVICE — SUCTION INSTRUMENT YANKAUER BULBOUS TIP W/O VENT (50EA/CA)

## (undated) DEVICE — NEPTUNE 4 PORT MANIFOLD - (20/PK)

## (undated) DEVICE — GOWN SURGEONS X-LARGE - DISP. (30/CA)

## (undated) DEVICE — GLOVE BIOGEL SZ 8 SURGICAL PF LTX - (50PR/BX 4BX/CA)

## (undated) DEVICE — LEAD SET 6 DISP. EKG NIHON KOHDEN

## (undated) DEVICE — GLOVE SZ 7.5 BIOGEL PI MICRO - PF LF (50PR/BX)

## (undated) DEVICE — KIT SURGIFLO W/OUT THROMBIN - (6EA/CA)

## (undated) DEVICE — SUTURE 0 SILK TIES (36PK/BX)

## (undated) DEVICE — GVL 3 STAT DISPOSABLE - (10/BX)

## (undated) DEVICE — STAPLER SKIN DISP - (6/BX 10BX/CA) VISISTAT

## (undated) DEVICE — TUBE ENDOBRONCHEAL 35FR - (1/BX)

## (undated) DEVICE — BLANKET WARMING LOWER BODY (10EA/CA)

## (undated) DEVICE — SUTURE 4-0 VICRYL PLUS FS-2 - 27 INCH (36/BX)

## (undated) DEVICE — HUMID-VENT HEAT AND MOISTURE EXCHANGE- (50/BX)

## (undated) DEVICE — DRAPE C-ARM LARGE 41IN X 74 IN - (10/BX 2BX/CA)

## (undated) DEVICE — SET LEADWIRE 5 LEAD BEDSIDE DISPOSABLE ECG (1SET OF 5/EA)

## (undated) DEVICE — SENSOR SPO2 NEO LNCS ADHESIVE (20/BX) SEE USER NOTES

## (undated) DEVICE — KIT ANESTHESIA W/CIRCUIT & 3/LT BAG W/FILTER (20EA/CA)

## (undated) DEVICE — TUBE NG SALEM SUMP 16FR (50EA/CA)

## (undated) DEVICE — GOWN WARMING STANDARD FLEX - (30/CA)

## (undated) DEVICE — Device

## (undated) DEVICE — TOWELS CLOTH SURGICAL - (4/PK 20PK/CA)

## (undated) DEVICE — TUBE CONNECT SUCTION CLEAR 120 X 1/4" (50EA/CA)"

## (undated) DEVICE — GLOVE BIOGEL PI ORTHO SZ 6 1/2 SURGICAL PF LF (40PR/BX)

## (undated) DEVICE — ELECTRODE 850 FOAM ADHESIVE - HYDROGEL RADIOTRNSPRNT (50/PK)

## (undated) DEVICE — DRAPE SURGICAL U 77X120 - (10/CA)

## (undated) DEVICE — TRANSDUCER ADULT DISP. SINGLE BONDED STERILE - (20EA/CA)

## (undated) DEVICE — PACK MINOR BASIN - (2EA/CA)

## (undated) DEVICE — KIT ARTERIAL LINE 20 GA - (10/BX)

## (undated) DEVICE — CORDS BIPOLAR COAGULATION - 12FT STERILE DISP. (10EA/BX)

## (undated) DEVICE — TRAY MULTI-LUMEN 7FR PRESSURE W/MAX BARRIER AND BIOPATCH - (5/CA)

## (undated) DEVICE — SUTURE 0 SILK CT-1 (36PK/BX)

## (undated) DEVICE — SPONGE DRAIN 4 X 4IN 6-PLY - (2/PK25PK/BX12BX/CS)

## (undated) DEVICE — GLOVE BIOGEL M SZ 8 SURGICAL PF LTX - (50/BX 4BX/CA)

## (undated) DEVICE — TROCAR 5X100 NON BLADED Z-TH - READ KII (6/BX)

## (undated) DEVICE — TUBE E-T HI-LO CUFF 7.5MM (10EA/PK)

## (undated) DEVICE — DRESSING XEROFORM 1X8 - (50/BX 4BX/CA)

## (undated) DEVICE — HEAD HOLDER JUNIOR/ADULT

## (undated) DEVICE — GLOVE BIOGEL SZ 6.5 SURGICAL PF LTX (50PR/BX 4BX/CA)

## (undated) DEVICE — SUTURE 1 VICRYL PLUS CTX - 36 INCH (36/BX)

## (undated) DEVICE — SUTURE 1 SILK 2 X 60 (36PK/BX)

## (undated) DEVICE — SYSTEM CHEST DRAIN ADULT/PEDS W/AUTO TRANSFUSION CAPABILITY SAHARA (6EA/CA)

## (undated) DEVICE — NEEDLE NON SAFETY 25 GA X 1 1/2 IN HYPO (100EA/BX)

## (undated) DEVICE — CATHETER THORACIC 28FR - W/ SENTINEL EYE (10/CA)

## (undated) DEVICE — SODIUM CHL. INJ. 0.9% 500ML (24EA/CA 50CA/PF)

## (undated) DEVICE — SLEEVE, VASO, THIGH, MED

## (undated) DEVICE — GLOVE SZ 6 BIOGEL PI MICRO - PF LF (50PR/BX 4BX/CA)

## (undated) DEVICE — GLOVE BIOGEL INDICATOR SZ 9 SURGICAL PF LTX - (160/CA)

## (undated) DEVICE — DRESSING TRANSPARENT FILM TEGADERM 4 X 4.75" (50EA/BX)"

## (undated) DEVICE — KIT ROOM DECONTAMINATION

## (undated) DEVICE — GLOVE BIOGEL INDICATOR SZ 8 SURGICAL PF LTX - (50/BX 4BX/CA)

## (undated) DEVICE — CONTAINER SPECIMEN BAG OR - STERILE 4 OZ W/LID (100EA/CA)

## (undated) DEVICE — DRAPESURG STERI-DRAPE LONG - (10/BX 4BX/CA)

## (undated) DEVICE — TUBE CONNECTING SUCTION - CLEAR PLASTIC STERILE 72 IN (50EA/CA)

## (undated) DEVICE — INSTRUMENT JAWCOVER SUTURE - BOOTS (5PK/BX)

## (undated) DEVICE — SUTURE 0 ETHIBOND OS-4 - (36/BX) 30 INCH

## (undated) DEVICE — GLOVE BIOGEL INDICATOR SZ 7SURGICAL PF LTX - (50/BX 4BX/CA)

## (undated) DEVICE — SUTURE 2-0 SILK 12 X 18" (36PK/BX)"

## (undated) DEVICE — CONTAINER, SPECIMEN, STERILE

## (undated) DEVICE — DRESSING NON-ADHERING 8 X 3 - (50/BX)

## (undated) DEVICE — SET SUCTION/IRRIGATION WITH DISPOSABLE TIP (6/CA )PART #0250-070-520 IS A SUB

## (undated) DEVICE — BANDAID X-LARGE 2 X 4 IN LF (50EA/BX)

## (undated) DEVICE — GLOVE BIOGEL PI INDICATOR SZ 6.5 SURGICAL PF LF - (50/BX 4BX/CA)

## (undated) DEVICE — GLOVE BIOGEL PI INDICATOR SZ 7.5 SURGICAL PF LF -(50/BX 4BX/CA)

## (undated) DEVICE — REMOTE

## (undated) DEVICE — DRAPE LAPAROTOMY T SHEET - (12EA/CA)

## (undated) DEVICE — GLOVE BIOGEL SZ 7.5 SURGICAL PF LTX - (50PR/BX 4BX/CA)

## (undated) DEVICE — NEEDLE NON SAFETY HYPO 22 GA X 1 1/2 IN (100/BX)

## (undated) DEVICE — BLADE CLIPPER FITS 2501 CLIPPER (BLUE)  (20EA/CA)

## (undated) DEVICE — GLOVE, LITE (PAIR)

## (undated) DEVICE — DRAPE STRLE REG TOWEL 18X24 - (10/BX 4BX/CA)"

## (undated) DEVICE — SUTURE 0 VICRYL PLUS CTX - 36 INCH (36/BX)

## (undated) DEVICE — GLOVE BIOGEL PI INDICATOR SZ 7.0 SURGICAL PF LF - (50/BX 4BX/CA)

## (undated) DEVICE — ANTI-FOG SOLUTION - 60BTL/CA

## (undated) DEVICE — BLADE SURGICAL #11 - (50/BX)

## (undated) DEVICE — DRAPE MAGNETIC (INSTRA-MAG) - (30/CA)

## (undated) DEVICE — SUTURE 2-0 VICRYL PLUS CT-1 - 8 X 18 INCH(12/BX)

## (undated) DEVICE — GLOVE BIOGEL INDICATOR SZ 7.5 SURGICAL PF LTX - (50PR/BX 4BX/CA)

## (undated) DEVICE — PACK MAJOR BASIN - (2EA/CA)

## (undated) DEVICE — SYRINGE LOSS OF RESIST. 50/BX - (50/CA)

## (undated) DEVICE — BRONCHOSCOPE 4 SLIM 3.8/1.2 (5EA/CA)

## (undated) DEVICE — CANNULA W/SEAL 5X100 Z-THRE - ADED KII (12/BX)

## (undated) DEVICE — MIDAS LUBRICATOR DIFFUSER PACK (4EA/CA)